# Patient Record
Sex: MALE | Race: WHITE | NOT HISPANIC OR LATINO | URBAN - METROPOLITAN AREA
[De-identification: names, ages, dates, MRNs, and addresses within clinical notes are randomized per-mention and may not be internally consistent; named-entity substitution may affect disease eponyms.]

---

## 2017-09-27 ENCOUNTER — EMERGENCY (EMERGENCY)
Facility: HOSPITAL | Age: 62
LOS: 1 days | Discharge: HOME | End: 2017-09-27
Attending: EMERGENCY MEDICINE | Admitting: EMERGENCY MEDICINE
Payer: MEDICARE

## 2017-09-27 VITALS
RESPIRATION RATE: 18 BRPM | SYSTOLIC BLOOD PRESSURE: 112 MMHG | HEIGHT: 72 IN | DIASTOLIC BLOOD PRESSURE: 65 MMHG | OXYGEN SATURATION: 97 % | WEIGHT: 308.65 LBS | TEMPERATURE: 98 F | HEART RATE: 74 BPM

## 2017-09-27 DIAGNOSIS — M54.6 PAIN IN THORACIC SPINE: ICD-10-CM

## 2017-09-27 DIAGNOSIS — Z79.82 LONG TERM (CURRENT) USE OF ASPIRIN: ICD-10-CM

## 2017-09-27 DIAGNOSIS — Z79.4 LONG TERM (CURRENT) USE OF INSULIN: ICD-10-CM

## 2017-09-27 DIAGNOSIS — M54.5 LOW BACK PAIN: ICD-10-CM

## 2017-09-27 DIAGNOSIS — Z88.5 ALLERGY STATUS TO NARCOTIC AGENT: ICD-10-CM

## 2017-09-27 DIAGNOSIS — E11.9 TYPE 2 DIABETES MELLITUS WITHOUT COMPLICATIONS: ICD-10-CM

## 2017-09-27 DIAGNOSIS — M47.895 OTHER SPONDYLOSIS, THORACOLUMBAR REGION: ICD-10-CM

## 2017-09-27 PROCEDURE — 72100 X-RAY EXAM L-S SPINE 2/3 VWS: CPT

## 2017-09-27 PROCEDURE — 99284 EMERGENCY DEPT VISIT MOD MDM: CPT | Mod: 25

## 2017-09-27 PROCEDURE — 72100 X-RAY EXAM L-S SPINE 2/3 VWS: CPT | Mod: 26

## 2017-09-27 PROCEDURE — 99283 EMERGENCY DEPT VISIT LOW MDM: CPT

## 2017-09-27 PROCEDURE — 72070 X-RAY EXAM THORAC SPINE 2VWS: CPT

## 2017-09-27 PROCEDURE — 72070 X-RAY EXAM THORAC SPINE 2VWS: CPT | Mod: 26

## 2017-09-27 RX ORDER — CYCLOBENZAPRINE HYDROCHLORIDE 10 MG/1
1 TABLET, FILM COATED ORAL
Qty: 15 | Refills: 0 | OUTPATIENT
Start: 2017-09-27 | End: 2017-10-02

## 2017-09-27 RX ORDER — CYCLOBENZAPRINE HYDROCHLORIDE 10 MG/1
10 TABLET, FILM COATED ORAL ONCE
Qty: 0 | Refills: 0 | Status: COMPLETED | OUTPATIENT
Start: 2017-09-27 | End: 2017-09-27

## 2017-09-27 RX ADMIN — CYCLOBENZAPRINE HYDROCHLORIDE 10 MILLIGRAM(S): 10 TABLET, FILM COATED ORAL at 14:30

## 2017-09-27 NOTE — ED PROVIDER NOTE - MUSCULOSKELETAL, MLM
Spine appears normal, range of motion is not limited, +b/l paraspinal thoracic and lumbar ttp, no midline C/T/L spine ttp

## 2017-09-27 NOTE — ED ADULT NURSE NOTE - OBJECTIVE STATEMENT
Pt is C/O B/L upper & lower back pain x 1 week, not relieved with ibuprofen. Pt reports difficulty getting up today.

## 2017-09-27 NOTE — ED PROVIDER NOTE - OBJECTIVE STATEMENT
60 yo male hx of DM c/o 1 week of upper and lower back pain, nonradiating, located b/l paraspinal.  No fever/chills, no urinary/bowel changes. Taking motrin without much relief. Now getting worse, had to have his daughter help him get up.  No hx of trauma/fall.  Is a retired . PMD Dr. Coto.  Scheduled to see a neurologist Dr. Ohara

## 2017-09-27 NOTE — ED PROVIDER NOTE - CARE PLAN
Principal Discharge DX:	Back pain Principal Discharge DX:	Back pain  Secondary Diagnosis:	Osteoarthritis of thoracolumbar spine, unspecified spinal osteoarthritis complication status

## 2017-09-27 NOTE — ED PROVIDER NOTE - MEDICAL DECISION MAKING DETAILS
muscle relaxers, Xray's, likely musculoskeletal muscle relaxers, Xray's, likely musculoskeletal, will f/u with ortho spine

## 2017-12-27 ENCOUNTER — EMERGENCY (EMERGENCY)
Facility: HOSPITAL | Age: 62
LOS: 1 days | Discharge: HOME | End: 2017-12-27
Attending: EMERGENCY MEDICINE | Admitting: EMERGENCY MEDICINE
Payer: MEDICARE

## 2017-12-27 VITALS
DIASTOLIC BLOOD PRESSURE: 75 MMHG | HEART RATE: 78 BPM | TEMPERATURE: 99 F | SYSTOLIC BLOOD PRESSURE: 127 MMHG | RESPIRATION RATE: 16 BRPM | OXYGEN SATURATION: 98 %

## 2017-12-27 VITALS
OXYGEN SATURATION: 98 % | HEART RATE: 83 BPM | SYSTOLIC BLOOD PRESSURE: 114 MMHG | DIASTOLIC BLOOD PRESSURE: 65 MMHG | WEIGHT: 315 LBS | TEMPERATURE: 98 F | RESPIRATION RATE: 16 BRPM

## 2017-12-27 LAB
ALBUMIN SERPL ELPH-MCNC: 2.8 G/DL — LOW (ref 3.3–5)
ALP SERPL-CCNC: 252 U/L — HIGH (ref 40–120)
ALT FLD-CCNC: 30 U/L — SIGNIFICANT CHANGE UP (ref 12–78)
ANION GAP SERPL CALC-SCNC: 9 MMOL/L — SIGNIFICANT CHANGE UP (ref 5–17)
AST SERPL-CCNC: 34 U/L — SIGNIFICANT CHANGE UP (ref 15–37)
BASOPHILS # BLD AUTO: 0.1 K/UL — SIGNIFICANT CHANGE UP (ref 0–0.2)
BASOPHILS NFR BLD AUTO: 1.6 % — SIGNIFICANT CHANGE UP (ref 0–2)
BILIRUB SERPL-MCNC: 1.9 MG/DL — HIGH (ref 0.2–1.2)
BUN SERPL-MCNC: 21 MG/DL — SIGNIFICANT CHANGE UP (ref 7–23)
CALCIUM SERPL-MCNC: 8.6 MG/DL — SIGNIFICANT CHANGE UP (ref 8.5–10.1)
CHLORIDE SERPL-SCNC: 101 MMOL/L — SIGNIFICANT CHANGE UP (ref 96–108)
CO2 SERPL-SCNC: 24 MMOL/L — SIGNIFICANT CHANGE UP (ref 22–31)
CREAT SERPL-MCNC: 1 MG/DL — SIGNIFICANT CHANGE UP (ref 0.5–1.3)
EOSINOPHIL # BLD AUTO: 0.2 K/UL — SIGNIFICANT CHANGE UP (ref 0–0.5)
EOSINOPHIL NFR BLD AUTO: 4.7 % — SIGNIFICANT CHANGE UP (ref 0–6)
GLUCOSE SERPL-MCNC: 353 MG/DL — HIGH (ref 70–99)
HCT VFR BLD CALC: 36.8 % — LOW (ref 39–50)
HGB BLD-MCNC: 12.6 G/DL — LOW (ref 13–17)
LACTATE SERPL-SCNC: 2 MMOL/L — SIGNIFICANT CHANGE UP (ref 0.7–2)
LYMPHOCYTES # BLD AUTO: 1.5 K/UL — SIGNIFICANT CHANGE UP (ref 1–3.3)
LYMPHOCYTES # BLD AUTO: 35.4 % — SIGNIFICANT CHANGE UP (ref 13–44)
MCHC RBC-ENTMCNC: 33.7 PG — SIGNIFICANT CHANGE UP (ref 27–34)
MCHC RBC-ENTMCNC: 34.1 GM/DL — SIGNIFICANT CHANGE UP (ref 32–36)
MCV RBC AUTO: 98.8 FL — SIGNIFICANT CHANGE UP (ref 80–100)
MONOCYTES # BLD AUTO: 0.4 K/UL — SIGNIFICANT CHANGE UP (ref 0–0.9)
MONOCYTES NFR BLD AUTO: 10 % — HIGH (ref 1–9)
NEUTROPHILS # BLD AUTO: 2 K/UL — SIGNIFICANT CHANGE UP (ref 1.8–7.4)
NEUTROPHILS NFR BLD AUTO: 48.3 % — SIGNIFICANT CHANGE UP (ref 43–77)
PLATELET # BLD AUTO: 105 K/UL — LOW (ref 150–400)
POTASSIUM SERPL-MCNC: 4.3 MMOL/L — SIGNIFICANT CHANGE UP (ref 3.5–5.3)
POTASSIUM SERPL-SCNC: 4.3 MMOL/L — SIGNIFICANT CHANGE UP (ref 3.5–5.3)
PROT SERPL-MCNC: 6.5 G/DL — SIGNIFICANT CHANGE UP (ref 6–8.3)
RBC # BLD: 3.73 M/UL — LOW (ref 4.2–5.8)
RBC # FLD: 12.6 % — SIGNIFICANT CHANGE UP (ref 10.3–14.5)
SODIUM SERPL-SCNC: 134 MMOL/L — LOW (ref 135–145)
WBC # BLD: 4.2 K/UL — SIGNIFICANT CHANGE UP (ref 3.8–10.5)
WBC # FLD AUTO: 4.2 K/UL — SIGNIFICANT CHANGE UP (ref 3.8–10.5)

## 2017-12-27 PROCEDURE — 85027 COMPLETE CBC AUTOMATED: CPT

## 2017-12-27 PROCEDURE — 93971 EXTREMITY STUDY: CPT

## 2017-12-27 PROCEDURE — 80053 COMPREHEN METABOLIC PANEL: CPT

## 2017-12-27 PROCEDURE — 99284 EMERGENCY DEPT VISIT MOD MDM: CPT

## 2017-12-27 PROCEDURE — 83605 ASSAY OF LACTIC ACID: CPT

## 2017-12-27 PROCEDURE — 99284 EMERGENCY DEPT VISIT MOD MDM: CPT | Mod: 25

## 2017-12-27 PROCEDURE — 93971 EXTREMITY STUDY: CPT | Mod: 26,LT

## 2017-12-27 PROCEDURE — 96374 THER/PROPH/DIAG INJ IV PUSH: CPT

## 2017-12-27 PROCEDURE — 87040 BLOOD CULTURE FOR BACTERIA: CPT

## 2017-12-27 RX ORDER — VANCOMYCIN HCL 1 G
2000 VIAL (EA) INTRAVENOUS ONCE
Qty: 0 | Refills: 0 | Status: COMPLETED | OUTPATIENT
Start: 2017-12-27 | End: 2017-12-27

## 2017-12-27 RX ORDER — VANCOMYCIN HCL 1 G
2200 VIAL (EA) INTRAVENOUS ONCE
Qty: 0 | Refills: 0 | Status: DISCONTINUED | OUTPATIENT
Start: 2017-12-27 | End: 2017-12-27

## 2017-12-27 RX ORDER — VANCOMYCIN HCL 1 G
VIAL (EA) INTRAVENOUS
Qty: 0 | Refills: 0 | Status: DISCONTINUED | OUTPATIENT
Start: 2017-12-27 | End: 2017-12-27

## 2017-12-27 RX ORDER — SODIUM CHLORIDE 9 MG/ML
1000 INJECTION INTRAMUSCULAR; INTRAVENOUS; SUBCUTANEOUS ONCE
Qty: 0 | Refills: 0 | Status: COMPLETED | OUTPATIENT
Start: 2017-12-27 | End: 2017-12-27

## 2017-12-27 RX ADMIN — Medication 250 MILLIGRAM(S): at 14:22

## 2017-12-27 RX ADMIN — SODIUM CHLORIDE 2000 MILLILITER(S): 9 INJECTION INTRAMUSCULAR; INTRAVENOUS; SUBCUTANEOUS at 14:21

## 2017-12-27 NOTE — ED PROVIDER NOTE - OBJECTIVE STATEMENT
61 y/o m with left lower extremity wound and redness x 1 week.  Pt states he is a diabetic and dint notice how he got the initial abrasion.  Pt was placed on Augmentin and Doxy by PCP with no improvement of symptoms. Yesterday while at the office the small area of induration opened and drained.  Pt states that since then his pain and the rash/redness has improved.  Pt denies fevers, chills.

## 2018-01-01 LAB
CULTURE RESULTS: SIGNIFICANT CHANGE UP
CULTURE RESULTS: SIGNIFICANT CHANGE UP
SPECIMEN SOURCE: SIGNIFICANT CHANGE UP
SPECIMEN SOURCE: SIGNIFICANT CHANGE UP

## 2018-01-23 ENCOUNTER — INPATIENT (INPATIENT)
Facility: HOSPITAL | Age: 63
LOS: 0 days | Discharge: HOME | DRG: 603 | End: 2018-01-24
Attending: FAMILY MEDICINE | Admitting: INTERNAL MEDICINE
Payer: MEDICARE

## 2018-01-23 VITALS
HEART RATE: 91 BPM | DIASTOLIC BLOOD PRESSURE: 76 MMHG | RESPIRATION RATE: 15 BRPM | HEIGHT: 72 IN | WEIGHT: 315 LBS | OXYGEN SATURATION: 97 % | SYSTOLIC BLOOD PRESSURE: 135 MMHG | TEMPERATURE: 98 F

## 2018-01-23 DIAGNOSIS — Z29.9 ENCOUNTER FOR PROPHYLACTIC MEASURES, UNSPECIFIED: ICD-10-CM

## 2018-01-23 DIAGNOSIS — Z90.49 ACQUIRED ABSENCE OF OTHER SPECIFIED PARTS OF DIGESTIVE TRACT: Chronic | ICD-10-CM

## 2018-01-23 DIAGNOSIS — E78.00 PURE HYPERCHOLESTEROLEMIA, UNSPECIFIED: ICD-10-CM

## 2018-01-23 DIAGNOSIS — L03.116 CELLULITIS OF LEFT LOWER LIMB: ICD-10-CM

## 2018-01-23 DIAGNOSIS — I10 ESSENTIAL (PRIMARY) HYPERTENSION: ICD-10-CM

## 2018-01-23 DIAGNOSIS — E11.9 TYPE 2 DIABETES MELLITUS WITHOUT COMPLICATIONS: ICD-10-CM

## 2018-01-23 DIAGNOSIS — L03.90 CELLULITIS, UNSPECIFIED: ICD-10-CM

## 2018-01-23 DIAGNOSIS — G62.9 POLYNEUROPATHY, UNSPECIFIED: ICD-10-CM

## 2018-01-23 LAB
ALBUMIN SERPL ELPH-MCNC: 3.1 G/DL — LOW (ref 3.3–5)
ALP SERPL-CCNC: 244 U/L — HIGH (ref 40–120)
ALT FLD-CCNC: 36 U/L — SIGNIFICANT CHANGE UP (ref 12–78)
ANION GAP SERPL CALC-SCNC: 10 MMOL/L — SIGNIFICANT CHANGE UP (ref 5–17)
APPEARANCE UR: CLEAR — SIGNIFICANT CHANGE UP
APTT BLD: 36.1 SEC — SIGNIFICANT CHANGE UP (ref 27.5–37.4)
AST SERPL-CCNC: 49 U/L — HIGH (ref 15–37)
BASOPHILS # BLD AUTO: 0.1 K/UL — SIGNIFICANT CHANGE UP (ref 0–0.2)
BASOPHILS NFR BLD AUTO: 1.8 % — SIGNIFICANT CHANGE UP (ref 0–2)
BILIRUB SERPL-MCNC: 2.2 MG/DL — HIGH (ref 0.2–1.2)
BILIRUB UR-MCNC: ABNORMAL
BUN SERPL-MCNC: 18 MG/DL — SIGNIFICANT CHANGE UP (ref 7–23)
CALCIUM SERPL-MCNC: 8.9 MG/DL — SIGNIFICANT CHANGE UP (ref 8.5–10.1)
CHLORIDE SERPL-SCNC: 103 MMOL/L — SIGNIFICANT CHANGE UP (ref 96–108)
CO2 SERPL-SCNC: 25 MMOL/L — SIGNIFICANT CHANGE UP (ref 22–31)
COLOR SPEC: YELLOW — SIGNIFICANT CHANGE UP
CREAT SERPL-MCNC: 1 MG/DL — SIGNIFICANT CHANGE UP (ref 0.5–1.3)
DIFF PNL FLD: NEGATIVE — SIGNIFICANT CHANGE UP
EOSINOPHIL # BLD AUTO: 0.2 K/UL — SIGNIFICANT CHANGE UP (ref 0–0.5)
EOSINOPHIL NFR BLD AUTO: 4.9 % — SIGNIFICANT CHANGE UP (ref 0–6)
GLUCOSE SERPL-MCNC: 286 MG/DL — HIGH (ref 70–99)
GLUCOSE UR QL: 1000 MG/DL
HCT VFR BLD CALC: 38.4 % — LOW (ref 39–50)
HGB BLD-MCNC: 12.6 G/DL — LOW (ref 13–17)
INR BLD: 1.23 RATIO — HIGH (ref 0.88–1.16)
KETONES UR-MCNC: NEGATIVE — SIGNIFICANT CHANGE UP
LACTATE SERPL-SCNC: 2 MMOL/L — SIGNIFICANT CHANGE UP (ref 0.7–2)
LACTATE SERPL-SCNC: 2.6 MMOL/L — HIGH (ref 0.7–2)
LEUKOCYTE ESTERASE UR-ACNC: NEGATIVE — SIGNIFICANT CHANGE UP
LYMPHOCYTES # BLD AUTO: 1.5 K/UL — SIGNIFICANT CHANGE UP (ref 1–3.3)
LYMPHOCYTES # BLD AUTO: 40.1 % — SIGNIFICANT CHANGE UP (ref 13–44)
MCHC RBC-ENTMCNC: 32.6 PG — SIGNIFICANT CHANGE UP (ref 27–34)
MCHC RBC-ENTMCNC: 32.7 GM/DL — SIGNIFICANT CHANGE UP (ref 32–36)
MCV RBC AUTO: 99.5 FL — SIGNIFICANT CHANGE UP (ref 80–100)
MONOCYTES # BLD AUTO: 0.3 K/UL — SIGNIFICANT CHANGE UP (ref 0–0.9)
MONOCYTES NFR BLD AUTO: 9 % — SIGNIFICANT CHANGE UP (ref 1–9)
NEUTROPHILS # BLD AUTO: 1.6 K/UL — LOW (ref 1.8–7.4)
NEUTROPHILS NFR BLD AUTO: 44.2 % — SIGNIFICANT CHANGE UP (ref 43–77)
NITRITE UR-MCNC: NEGATIVE — SIGNIFICANT CHANGE UP
PH UR: 5 — SIGNIFICANT CHANGE UP (ref 5–8)
PLATELET # BLD AUTO: 106 K/UL — LOW (ref 150–400)
POTASSIUM SERPL-MCNC: 4.3 MMOL/L — SIGNIFICANT CHANGE UP (ref 3.5–5.3)
POTASSIUM SERPL-SCNC: 4.3 MMOL/L — SIGNIFICANT CHANGE UP (ref 3.5–5.3)
PROT SERPL-MCNC: 7.2 G/DL — SIGNIFICANT CHANGE UP (ref 6–8.3)
PROT UR-MCNC: NEGATIVE — SIGNIFICANT CHANGE UP
PROTHROM AB SERPL-ACNC: 13.5 SEC — HIGH (ref 9.8–12.7)
RBC # BLD: 3.86 M/UL — LOW (ref 4.2–5.8)
RBC # FLD: 12.6 % — SIGNIFICANT CHANGE UP (ref 10.3–14.5)
SODIUM SERPL-SCNC: 138 MMOL/L — SIGNIFICANT CHANGE UP (ref 135–145)
SP GR SPEC: 1.02 — SIGNIFICANT CHANGE UP (ref 1.01–1.02)
UROBILINOGEN FLD QL: NEGATIVE — SIGNIFICANT CHANGE UP
WBC # BLD: 3.7 K/UL — LOW (ref 3.8–10.5)
WBC # FLD AUTO: 3.7 K/UL — LOW (ref 3.8–10.5)

## 2018-01-23 PROCEDURE — 71046 X-RAY EXAM CHEST 2 VIEWS: CPT | Mod: 26

## 2018-01-23 PROCEDURE — 99285 EMERGENCY DEPT VISIT HI MDM: CPT

## 2018-01-23 PROCEDURE — 93970 EXTREMITY STUDY: CPT | Mod: 26

## 2018-01-23 PROCEDURE — 73590 X-RAY EXAM OF LOWER LEG: CPT | Mod: 26,LT

## 2018-01-23 PROCEDURE — 99223 1ST HOSP IP/OBS HIGH 75: CPT | Mod: AI,GC

## 2018-01-23 RX ORDER — VANCOMYCIN HCL 1 G
1000 VIAL (EA) INTRAVENOUS ONCE
Qty: 0 | Refills: 0 | Status: COMPLETED | OUTPATIENT
Start: 2018-01-23 | End: 2018-01-23

## 2018-01-23 RX ORDER — ATORVASTATIN CALCIUM 80 MG/1
10 TABLET, FILM COATED ORAL AT BEDTIME
Qty: 0 | Refills: 0 | Status: DISCONTINUED | OUTPATIENT
Start: 2018-01-23 | End: 2018-01-24

## 2018-01-23 RX ORDER — INSULIN LISPRO 100/ML
0 VIAL (ML) SUBCUTANEOUS
Qty: 0 | Refills: 0 | COMMUNITY

## 2018-01-23 RX ORDER — INSULIN GLARGINE 100 [IU]/ML
0 INJECTION, SOLUTION SUBCUTANEOUS
Qty: 0 | Refills: 0 | COMMUNITY

## 2018-01-23 RX ORDER — INSULIN LISPRO 100/ML
VIAL (ML) SUBCUTANEOUS
Qty: 0 | Refills: 0 | Status: DISCONTINUED | OUTPATIENT
Start: 2018-01-23 | End: 2018-01-24

## 2018-01-23 RX ORDER — SODIUM CHLORIDE 9 MG/ML
1000 INJECTION, SOLUTION INTRAVENOUS
Qty: 0 | Refills: 0 | Status: DISCONTINUED | OUTPATIENT
Start: 2018-01-23 | End: 2018-01-24

## 2018-01-23 RX ORDER — CEFAZOLIN SODIUM 1 G
VIAL (EA) INJECTION
Qty: 0 | Refills: 0 | Status: DISCONTINUED | OUTPATIENT
Start: 2018-01-23 | End: 2018-01-24

## 2018-01-23 RX ORDER — INSULIN LISPRO 100/ML
23 VIAL (ML) SUBCUTANEOUS
Qty: 0 | Refills: 0 | Status: DISCONTINUED | OUTPATIENT
Start: 2018-01-23 | End: 2018-01-24

## 2018-01-23 RX ORDER — DEXTROSE 50 % IN WATER 50 %
1 SYRINGE (ML) INTRAVENOUS ONCE
Qty: 0 | Refills: 0 | Status: DISCONTINUED | OUTPATIENT
Start: 2018-01-23 | End: 2018-01-24

## 2018-01-23 RX ORDER — INSULIN LISPRO 100/ML
VIAL (ML) SUBCUTANEOUS
Qty: 0 | Refills: 0 | Status: DISCONTINUED | OUTPATIENT
Start: 2018-01-23 | End: 2018-01-23

## 2018-01-23 RX ORDER — INSULIN LISPRO 100/ML
VIAL (ML) SUBCUTANEOUS AT BEDTIME
Qty: 0 | Refills: 0 | Status: DISCONTINUED | OUTPATIENT
Start: 2018-01-23 | End: 2018-01-23

## 2018-01-23 RX ORDER — SODIUM CHLORIDE 9 MG/ML
1000 INJECTION INTRAMUSCULAR; INTRAVENOUS; SUBCUTANEOUS ONCE
Qty: 0 | Refills: 0 | Status: COMPLETED | OUTPATIENT
Start: 2018-01-23 | End: 2018-01-23

## 2018-01-23 RX ORDER — CEFAZOLIN SODIUM 1 G
1000 VIAL (EA) INJECTION ONCE
Qty: 0 | Refills: 0 | Status: COMPLETED | OUTPATIENT
Start: 2018-01-23 | End: 2018-01-23

## 2018-01-23 RX ORDER — ASPIRIN/CALCIUM CARB/MAGNESIUM 324 MG
81 TABLET ORAL DAILY
Qty: 0 | Refills: 0 | Status: DISCONTINUED | OUTPATIENT
Start: 2018-01-23 | End: 2018-01-24

## 2018-01-23 RX ORDER — OXYCODONE AND ACETAMINOPHEN 5; 325 MG/1; MG/1
1 TABLET ORAL EVERY 6 HOURS
Qty: 0 | Refills: 0 | Status: DISCONTINUED | OUTPATIENT
Start: 2018-01-23 | End: 2018-01-24

## 2018-01-23 RX ORDER — SODIUM CHLORIDE 9 MG/ML
1000 INJECTION INTRAMUSCULAR; INTRAVENOUS; SUBCUTANEOUS
Qty: 0 | Refills: 0 | Status: DISCONTINUED | OUTPATIENT
Start: 2018-01-23 | End: 2018-01-24

## 2018-01-23 RX ORDER — LOSARTAN POTASSIUM 100 MG/1
100 TABLET, FILM COATED ORAL DAILY
Qty: 0 | Refills: 0 | Status: DISCONTINUED | OUTPATIENT
Start: 2018-01-23 | End: 2018-01-24

## 2018-01-23 RX ORDER — VANCOMYCIN HCL 1 G
1500 VIAL (EA) INTRAVENOUS EVERY 12 HOURS
Qty: 0 | Refills: 0 | Status: DISCONTINUED | OUTPATIENT
Start: 2018-01-23 | End: 2018-01-23

## 2018-01-23 RX ORDER — DEXTROSE 50 % IN WATER 50 %
12.5 SYRINGE (ML) INTRAVENOUS ONCE
Qty: 0 | Refills: 0 | Status: DISCONTINUED | OUTPATIENT
Start: 2018-01-23 | End: 2018-01-24

## 2018-01-23 RX ORDER — GABAPENTIN 400 MG/1
800 CAPSULE ORAL
Qty: 0 | Refills: 0 | Status: DISCONTINUED | OUTPATIENT
Start: 2018-01-23 | End: 2018-01-24

## 2018-01-23 RX ORDER — ACETAMINOPHEN 500 MG
650 TABLET ORAL EVERY 6 HOURS
Qty: 0 | Refills: 0 | Status: DISCONTINUED | OUTPATIENT
Start: 2018-01-23 | End: 2018-01-24

## 2018-01-23 RX ORDER — KETOROLAC TROMETHAMINE 30 MG/ML
15 SYRINGE (ML) INJECTION ONCE
Qty: 0 | Refills: 0 | Status: DISCONTINUED | OUTPATIENT
Start: 2018-01-23 | End: 2018-01-23

## 2018-01-23 RX ORDER — INSULIN LISPRO 100/ML
VIAL (ML) SUBCUTANEOUS AT BEDTIME
Qty: 0 | Refills: 0 | Status: DISCONTINUED | OUTPATIENT
Start: 2018-01-23 | End: 2018-01-24

## 2018-01-23 RX ORDER — DEXTROSE 50 % IN WATER 50 %
25 SYRINGE (ML) INTRAVENOUS ONCE
Qty: 0 | Refills: 0 | Status: DISCONTINUED | OUTPATIENT
Start: 2018-01-23 | End: 2018-01-24

## 2018-01-23 RX ORDER — CEFAZOLIN SODIUM 1 G
1000 VIAL (EA) INJECTION EVERY 8 HOURS
Qty: 0 | Refills: 0 | Status: DISCONTINUED | OUTPATIENT
Start: 2018-01-24 | End: 2018-01-24

## 2018-01-23 RX ORDER — INSULIN LISPRO 100/ML
30 VIAL (ML) SUBCUTANEOUS
Qty: 0 | Refills: 0 | COMMUNITY

## 2018-01-23 RX ORDER — SODIUM CHLORIDE 9 MG/ML
1000 INJECTION INTRAMUSCULAR; INTRAVENOUS; SUBCUTANEOUS
Qty: 0 | Refills: 0 | Status: COMPLETED | OUTPATIENT
Start: 2018-01-23 | End: 2018-01-23

## 2018-01-23 RX ORDER — GLUCAGON INJECTION, SOLUTION 0.5 MG/.1ML
1 INJECTION, SOLUTION SUBCUTANEOUS ONCE
Qty: 0 | Refills: 0 | Status: DISCONTINUED | OUTPATIENT
Start: 2018-01-23 | End: 2018-01-24

## 2018-01-23 RX ORDER — INSULIN GLARGINE 100 [IU]/ML
60 INJECTION, SOLUTION SUBCUTANEOUS AT BEDTIME
Qty: 0 | Refills: 0 | Status: DISCONTINUED | OUTPATIENT
Start: 2018-01-23 | End: 2018-01-24

## 2018-01-23 RX ADMIN — SODIUM CHLORIDE 1000 MILLILITER(S): 9 INJECTION INTRAMUSCULAR; INTRAVENOUS; SUBCUTANEOUS at 12:10

## 2018-01-23 RX ADMIN — SODIUM CHLORIDE 100 MILLILITER(S): 9 INJECTION INTRAMUSCULAR; INTRAVENOUS; SUBCUTANEOUS at 19:24

## 2018-01-23 RX ADMIN — Medication 100 MILLIGRAM(S): at 19:24

## 2018-01-23 RX ADMIN — SODIUM CHLORIDE 1000 MILLILITER(S): 9 INJECTION INTRAMUSCULAR; INTRAVENOUS; SUBCUTANEOUS at 10:47

## 2018-01-23 RX ADMIN — GABAPENTIN 800 MILLIGRAM(S): 400 CAPSULE ORAL at 19:04

## 2018-01-23 RX ADMIN — Medication 15 MILLIGRAM(S): at 11:01

## 2018-01-23 RX ADMIN — ATORVASTATIN CALCIUM 10 MILLIGRAM(S): 80 TABLET, FILM COATED ORAL at 22:47

## 2018-01-23 RX ADMIN — Medication 250 MILLIGRAM(S): at 10:47

## 2018-01-23 RX ADMIN — Medication 15 MILLIGRAM(S): at 10:46

## 2018-01-23 RX ADMIN — INSULIN GLARGINE 60 UNIT(S): 100 INJECTION, SOLUTION SUBCUTANEOUS at 22:47

## 2018-01-23 RX ADMIN — SODIUM CHLORIDE 1000 MILLILITER(S): 9 INJECTION INTRAMUSCULAR; INTRAVENOUS; SUBCUTANEOUS at 13:42

## 2018-01-23 NOTE — ED PROVIDER NOTE - CONSTITUTIONAL, MLM
normal... Well appearing, morbidly obese w male well nourished, awake, alert, oriented to person, place, time/situation and in no apparent distress.

## 2018-01-23 NOTE — H&P ADULT - ASSESSMENT
62M w/pmh of DMII with neuropathy, HLD and HTN presenting with left leg cellulitis admitted with LLE cellulitis. 63yo M w/ PMH of uncontrolled DM2 with neuropathy, HLD and HTN presenting with persistent left leg cellulitis admitted with L LE cellulitis.

## 2018-01-23 NOTE — ED PROVIDER NOTE - MUSCULOSKELETAL, MLM
Spine appears normal, range of motion is not limited, no muscle or joint tenderness except to the left leg and thigh.  the medial prox thigh along the greater saphenous and femoral vein there is pain and tender no swelling, the lower legs are both swollen and there is a fullness in the area where the abscess was with a covering of redness on the skin

## 2018-01-23 NOTE — ED PROVIDER NOTE - OBJECTIVE STATEMENT
pt presents with a note from dr marci turner [place] [phone] " Please admit for l leg cellulitis and discharge hx of htn dyslipediemia and venous insufficienca with "on".  pt also provides two study results including LETICIA and dvt studies both normal pt presents with a note from dr Brian myrick [place] [phone] " Please admit for l leg cellulitis and discharge hx of htn dyslipidemia and venous insufficiency with "on".  pt also provides two study results including LETICIA and dvt studies both normal.  he had been on abx after an abscess developed on left lat leg in december his doctors put him on amoxil then clinda 12/27 seen in er put on iv abx for a dose was getting better and continued the clinda.  his leg stablized, still reddend, he has seen a dermatologist vascular surgeon his endocrine specialist (he has dm iwht poor glucose control) neuropathy elev chol orif l elbow and sp gb not a smoker   vascular surgeon dr Juárez in [place]

## 2018-01-23 NOTE — H&P ADULT - NSHPREVIEWOFSYSTEMS_GEN_ALL_CORE
Constitutional: denies fever, chills  HEENT: denies blurry vision, difficulty hearing  Respiratory: denies SOB, MOYER, cough, sputum production, wheezing, hemoptysis  Cardiovascular: denies CP, palpitations  Gastrointestinal: denies nausea, vomiting, diarrhea, constipation, abdominal pain  Genitourinary: denies dysuria, frequency, urgency  Skin: +left leg infection with erythema and wound draining purulence  Musculoskeletal: +left leg pain, +left lower leg tenderness, +peripheral edema of b/l calves and ankles, denies muscle weakness  Neurologic: +peripheral neuropathy, denies headache, weakness, dizziness, paresthesias  Psychiatric: denies feeling anxious Constitutional: denies fever, chills  HEENT: denies blurry vision, difficulty hearing, sore throat  Respiratory: denies SOB, MOYER, cough, sputum production, wheezing, hemoptysis  Cardiovascular: denies CP, palpitations  Gastrointestinal: denies nausea, vomiting, diarrhea, constipation, abdominal pain  Genitourinary: denies dysuria, frequency, urgency  Skin: +left leg with erythema and tenderness   Musculoskeletal: +left leg pain, +left lower leg tenderness, +peripheral edema of b/l calves and ankles, denies muscle weakness  Neurologic: +peripheral neuropathy, denies headache, weakness, dizziness, paresthesias  Psychiatric: denies feeling anxious

## 2018-01-23 NOTE — ED ADULT NURSE NOTE - OBJECTIVE STATEMENT
Pt. stated he was advised by his endocrinologist and PMD for a possible infection of a wound on the left shin. Pt. stated he was previously treated with IV antibiotics for the wound. Pt. denied fever or chills. Pt. complained of tenderness to left lower extremity. Pt. stated he has a history of diabetes and his glucose levels have been elevated. Pt. stated wound was draining fluid yesterday. No drainage noted at the wound site at this time.

## 2018-01-23 NOTE — H&P ADULT - NSHPLABSRESULTS_GEN_ALL_CORE
US Duplex: No evidence of bilateral lower extremity deep venous thrombosis.  CXR: Nonspecific mild increased markings. No consolidation or pleural effusion. Heart size within normal limits. Mild elevation right hemidiaphragm    imaging personally reviewed

## 2018-01-23 NOTE — H&P ADULT - HISTORY OF PRESENT ILLNESS
In the ED, patients vitals were: 62M w/pmh of DMII with neuropathy, HLD and HTN presenting with left leg cellulitis. Patient states that he woke up on 12/20 with small cut/scratch on left leg. States that he does not recall how he obtained cut. He saw PCP a few days after as wound became erythematous with purulent drainage. Patient completed 10 day course of Doxycycline and was given mupirocin cream for wound. States that wound initially improved with decreased in size of wound but erythema persisted. Recently, patient saw PCP because he developed pain in left leg. States that leg pain is 5-6/10 in severity, non-radiating, erythematous with purulent drainage. No bleeding. States that he has never had skin infection/rash on leg before. Denies fevers, chills, n/v, chest pain, sob, abdominal pain, muscle weakness. Denies recent travel or sick contacts. Denies cat/dog or bug bites.     In the ED, patients vitals were: T97.8F, /79, HR 82, RR 16, SpO2 96% on room air. Significant labs include: Lactate 2.6, Glucose 286, Tbili 2.2, alk phos 244, AST 49. UA positive for small bili and 1000 glucose. Patient given Vanco 1g x1, 2L normal saline, Toradol 15mg x1.   US Duplex: No evidence of bilateral lower extremity deep venous thrombosis.  CXR: Nonspecific mild increased markings. No consolidation or pleural effusion. Heart size within normal limits. Mild elevation right hemidiaphragm  Tibia XR+Fibular XR: No acute fracture or dislocation. 63yo M w/ PMH of uncontrolled DM2 with neuropathy, HLD and HTN presenting with persistent left leg cellulitis. Patient states that he woke up on 12/20 with small cut/scratch on left leg. States that he does not recall how he obtained cut. He saw PCP a few days after as wound became erythematous with purulent drainage. Patient completed 10 day course of Doxycycline which resulted in some improvement but not complete. Saw dermatologist who cultured the purulent drainage and pt given course of clindamycin and mupirocin cream for wound. The wound closed, but some of the erythema and tenderness persisted. Recently, patient saw PCP because he developed pain in left leg. States that leg pain is 5-6/10 in severity, non-radiating, erythematous. No bleeding. States that he has never had skin infection/rash on leg before. Denies fevers, chills, n/v, chest pain, SOB, abdominal pain, muscle weakness. Denies recent travel or sick contacts. Denies cat/dog or bug bites.     In the ED, patients vitals were: T97.8F, /79, HR 82, RR 16, SpO2 96% on room air. Significant labs include: Lactate 2.6, Glucose 286, Tbili 2.2, alk phos 244, AST 49. UA positive for small bili and 1000 glucose. Patient given Vanco 1g x1, 2L normal saline, Toradol 15mg x1.   US Duplex: No evidence of bilateral lower extremity deep venous thrombosis.  CXR: Nonspecific mild increased markings. No consolidation or pleural effusion. Heart size within normal limits. Mild elevation right hemidiaphragm  Tibia XR+Fibular XR: No acute fracture or dislocation.

## 2018-01-23 NOTE — H&P ADULT - PROBLEM SELECTOR PLAN 2
Uncontrolled  Patient takes Lantus 80 units QHS and Humalog 30 units TID before meals  accuchecks  follow up HgA1c Uncontrolled  Patient takes Lantus 80 units QHS and Humalog 30 units TID before meals.   accuchecks  follow up HgA1c Uncontrolled  Patient takes Lantus 80 units QHS and Humalog 30 units TID before meals. Will continue Lantus 60units and Humalog 23 units TID before meals while in hospital.   accuchecks  follow up HgA1c Uncontrolled  Patient takes Lantus 80 units QHS and Humalog 30 units TID before meals. Will start Lantus 60units and Humalog 23 units TID before meals while in hospital (~75% of home dose for now as likely his diet will be different than home).  FSG   follow up HgA1c

## 2018-01-23 NOTE — PATIENT PROFILE ADULT. - ABILITY TO HEAR (WITH HEARING AID OR HEARING APPLIANCE IF NORMALLY USED):
Mildly to Moderately Impaired: difficulty hearing in some environments or speaker may need to increase volume or speak distinctly/difficulty hearing out of right ear

## 2018-01-23 NOTE — H&P ADULT - NSHPPHYSICALEXAM_GEN_ALL_CORE
Vital Signs Last 24 Hrs  T(C): 36.6 (23 Jan 2018 13:51), Max: 36.6 (23 Jan 2018 09:42)  T(F): 97.8 (23 Jan 2018 13:51), Max: 97.9 (23 Jan 2018 09:42)  HR: 82 (23 Jan 2018 13:51) (82 - 91)  BP: 144/79 (23 Jan 2018 13:51) (135/76 - 144/79)  RR: 16 (23 Jan 2018 13:51) (15 - 16)  SpO2: 96% (23 Jan 2018 13:51) (96% - 97%)    Physical Exam:  General: Well developed, well nourished, NAD  HEENT: NCAT, PERRLA, EOMI bl, moist mucous membranes   Neck: Supple, no mass  Neurology: A&Ox3, nonfocal, sensation intact  Respiratory: CTA B/L, No W/R/R  CV: RRR, +S1/S2, no murmurs, rubs or gallops  Abdominal: obese abdomen, +reducible ventral hernia, Soft, NT, ND +BSx4, no guarding, no rebound  Extremities: 2+ b/l pitting edema of b/l calves and ankles  MSK: Normal ROM, no joint erythema or warmth, no joint swelling   Skin: +erythema of lower left leg, ~1cm open wound without bleeding or drainage, +orange peel texture, warm, tender to palpation, RLE no lesions, wounds or erythema Vital Signs Last 24 Hrs  T(C): 36.6 (23 Jan 2018 13:51), Max: 36.6 (23 Jan 2018 09:42)  T(F): 97.8 (23 Jan 2018 13:51), Max: 97.9 (23 Jan 2018 09:42)  HR: 82 (23 Jan 2018 13:51) (82 - 91)  BP: 144/79 (23 Jan 2018 13:51) (135/76 - 144/79)  RR: 16 (23 Jan 2018 13:51) (15 - 16)  SpO2: 96% (23 Jan 2018 13:51) (96% - 97%)    Physical Exam:  General: Well developed, well nourished, NAD  HEENT: NC/AT, PERRLA, EOMI b/l, moist mucous membranes   Neck: Supple, no mass  Neurology: A&Ox3, nonfocal, sensation intact  Respiratory: CTA B/L, No rales or wheezes  CV: RRR, +S1/S2  Abdominal: obese abdomen, +reducible ventral hernia, Soft, NT, ND +BSx4, no guarding, no rebound  Extremities: 2+ b/l LE pitting edema of b/l calves and ankles  MSK: Normal ROM, no joint erythema or warmth, no joint swelling   Skin: +erythema of lower left leg, ~1cm healing wound without bleeding or drainage, warm, tender to palpation. R LE no lesions, wounds or erythema  Psych: normal affect

## 2018-01-23 NOTE — H&P ADULT - PROBLEM SELECTOR PLAN 6
SCDs for DVT ppx    IMPROVE VTE Individual Risk Assessment          RISK                                                          Points  [  ] Previous VTE                                                3  [  ] Thrombophilia                                             2  [  ] Lower limb paralysis                                   2        (unable to hold up >15 seconds)    [  ] Current Cancer                                             2         (within 6 months)  [  ] Immobilization > 24 hrs                              1  [  ] ICU/CCU stay > 24 hours                             1  [x  ] Age > 60                                                         1    IMPROVE VTE Score: 1 lovenox for DVT ppx    IMPROVE VTE Individual Risk Assessment          RISK                                                          Points  [  ] Previous VTE                                                3  [  ] Thrombophilia                                             2  [  ] Lower limb paralysis                                   2        (unable to hold up >15 seconds)    [  ] Current Cancer                                             2         (within 6 months)  [  ] Immobilization > 24 hrs                              1  [  ] ICU/CCU stay > 24 hours                             1  [x  ] Age > 60                                                         1    IMPROVE VTE Score: 1 somewhat elevated t bili and alk phos  -pt has had cholecystectomy in the past  -no abdominal symptoms  -discussed with PMD Dr. Coto over the phone who stated those are his baseline numbers for years and has had w/up and imaging as outpt that did not reveal anything significant  -will not pursue further as inpt -- f/up with GI/hepatology as outpt

## 2018-01-23 NOTE — H&P ADULT - PROBLEM SELECTOR PLAN 7
lovenox for DVT ppx    IMPROVE VTE Individual Risk Assessment          RISK                                                          Points  [  ] Previous VTE                                                3  [  ] Thrombophilia                                             2  [  ] Lower limb paralysis                                   2        (unable to hold up >15 seconds)    [  ] Current Cancer                                             2         (within 6 months)  [  ] Immobilization > 24 hrs                              1  [  ] ICU/CCU stay > 24 hours                             1  [x  ] Age > 60                                                         1    IMPROVE VTE Score: 1

## 2018-01-23 NOTE — H&P ADULT - NSHPSOCIALHISTORY_GEN_ALL_CORE
Lives with wife at home.   Retired.   Tobacco: never smoker  Alcohol: socially, last drink 5/2017  Drugs: denies

## 2018-01-23 NOTE — H&P ADULT - NSHPOUTPATIENTPROVIDERS_GEN_ALL_CORE
PMD- Dr. Brian Coto ([id number]) PMD- Dr. Brian Coto ([id number])  Endo-Dr. Mayer  Vascular surgeon- Dr. Juárez ([place]) PMD- Dr. Brian Coto ([id number])  Derm - Dr. Aminata Coto  Endo-Dr. Mayer  Vascular surgeon- Dr. Juárez ([place])

## 2018-01-23 NOTE — H&P ADULT - PROBLEM SELECTOR PLAN 1
Admit to [facility]  -Continue Vancomycin  - Follow up blood and wound culture  - Continue IVF Admit to [facility]  -Continue Vancomycin  - Follow up blood and wound culture  - Continue IVF  -Pain management with toradol and morphine Admit to [facility]  -Continue Vancomycin  - Follow up blood and wound culture  - Continue IVF  -Pain management with tylenol and percocet Admit to [facility]  -called dermatologist who had cultured purulent drainage a few weeks ago and found to have MSSA  -cellulitis and ulceration have improved significantly on outpt treatment but not completely which is why his PMD referred him to ER for admission for IV Abx  -given it was MSSA, will start Ancef and monitor for improvement  - Follow up blood and wound cultures   - lactate improved with IVF  - ID consult - Dr. Rios  - Pain management with tylenol and percocet  - if not improving on Ancef, consider imaging for deeper infection

## 2018-01-23 NOTE — ED PROVIDER NOTE - SKIN, MLM
Skin normal color for race, warm, dry and intact. there is a resolving abscess on left leg and red raised rash on legs no heat or discharge

## 2018-01-23 NOTE — H&P ADULT - ATTENDING COMMENTS
Pt seen + examined. Case discussed with intern/resident. Agree with H&P above (edited) with following addendum:  61yo M w/ PMH of uncontrolled DM2 with neuropathy, HLD and HTN presenting with persistent left leg cellulitis admitted with L LE cellulitis.  -called PMD and pt's dermatologist and spoke to them about progression of pt's cellulitis -- it seems pt's cellulitis and ulceration was much worse several weeks ago and he has had pretty good response so far, but incomplete response which is why PMD sent to the ER for IV Abx  -pt had clinda, which can sometimes have inducible resistance with Staph, so perhaps a penicillin or cephalosporin will fare better  -pan-sensitive MSSA on culture of the pus that dermatologist wilmer when pt had draining ulceration  -discussed with ID consult, Dr. Rios  -will start ancef and monitor for response -- if responding well, consider de-escalating to Keflex  -pt's cellulitis likely harder to control due to uncontrolled DM2  -pt also with venous insufficiency and b/l LE edema that causes more tension in the skin and is likely contributing to his tenderness  -recommended leg elevation and when lactate normalizes, consider starting low-dose diuretic tomorrow.   -will give 75% of home insulin dosing and titrate up as needed based on HISS coverage needed. Starting lower as pt's diet will likely be better controlled in the inpt setting.    Called PMD and spoke to him over the phone regarding admission. Pt seen + examined. Case discussed with intern/resident. Agree with H&P above (edited) with following addendum:  63yo M w/ PMH of uncontrolled DM2 with neuropathy, HLD and HTN presenting with persistent left leg cellulitis admitted with L LE cellulitis.  -called PMD and pt's dermatologist and spoke to them about progression of pt's cellulitis -- it seems pt's cellulitis and ulceration was much worse several weeks ago and he has had pretty good response so far, but incomplete response which is why PMD sent to the ER for IV Abx  -pt had clinda, which can sometimes have inducible resistance with Staph, so perhaps a penicillin or cephalosporin will fare better  -pan-sensitive MSSA on culture of the pus that dermatologist wilmer when pt had draining ulceration  -discussed with ID consult, Dr. Rios  -will start ancef and monitor for response -- if responding well, consider de-escalating to Keflex  -pt's cellulitis likely harder to control due to uncontrolled DM2  -pt also with venous insufficiency and b/l LE edema that causes more tension in the skin and is likely contributing to his tenderness  -recommended leg elevation and when lactate normalizes, consider starting low-dose diuretic tomorrow.   -will give 75% of home insulin dosing and titrate up as needed based on HISS coverage needed. Starting lower as pt's diet will likely be better controlled in the inpt setting.  -regarding somewhat elevated t bili and alk phos: discussed with PMD Dr. Coto over the phone who stated those are his baseline numbers for years and has had w/up and imaging as outpt that did not reveal anything significant; thus, will not pursue further as inpt -- f/up with GI/hepatology as outpt    Called PMD and spoke to him over the phone regarding admission.

## 2018-01-24 ENCOUNTER — TRANSCRIPTION ENCOUNTER (OUTPATIENT)
Age: 63
End: 2018-01-24

## 2018-01-24 VITALS
TEMPERATURE: 98 F | DIASTOLIC BLOOD PRESSURE: 72 MMHG | HEART RATE: 96 BPM | OXYGEN SATURATION: 96 % | RESPIRATION RATE: 18 BRPM | SYSTOLIC BLOOD PRESSURE: 137 MMHG

## 2018-01-24 DIAGNOSIS — E80.6 OTHER DISORDERS OF BILIRUBIN METABOLISM: ICD-10-CM

## 2018-01-24 LAB
ANION GAP SERPL CALC-SCNC: 8 MMOL/L — SIGNIFICANT CHANGE UP (ref 5–17)
BUN SERPL-MCNC: 14 MG/DL — SIGNIFICANT CHANGE UP (ref 7–23)
CALCIUM SERPL-MCNC: 8.6 MG/DL — SIGNIFICANT CHANGE UP (ref 8.5–10.1)
CHLORIDE SERPL-SCNC: 108 MMOL/L — SIGNIFICANT CHANGE UP (ref 96–108)
CO2 SERPL-SCNC: 25 MMOL/L — SIGNIFICANT CHANGE UP (ref 22–31)
CREAT SERPL-MCNC: 0.9 MG/DL — SIGNIFICANT CHANGE UP (ref 0.5–1.3)
GLUCOSE SERPL-MCNC: 210 MG/DL — HIGH (ref 70–99)
HBA1C BLD-MCNC: 10.2 % — HIGH (ref 4–5.6)
HCT VFR BLD CALC: 36.8 % — LOW (ref 39–50)
HGB BLD-MCNC: 12 G/DL — LOW (ref 13–17)
MCHC RBC-ENTMCNC: 31.7 PG — SIGNIFICANT CHANGE UP (ref 27–34)
MCHC RBC-ENTMCNC: 32.5 GM/DL — SIGNIFICANT CHANGE UP (ref 32–36)
MCV RBC AUTO: 97.6 FL — SIGNIFICANT CHANGE UP (ref 80–100)
PLATELET # BLD AUTO: 100 K/UL — LOW (ref 150–400)
POTASSIUM SERPL-MCNC: 4.1 MMOL/L — SIGNIFICANT CHANGE UP (ref 3.5–5.3)
POTASSIUM SERPL-SCNC: 4.1 MMOL/L — SIGNIFICANT CHANGE UP (ref 3.5–5.3)
RBC # BLD: 3.77 M/UL — LOW (ref 4.2–5.8)
RBC # FLD: 13.2 % — SIGNIFICANT CHANGE UP (ref 10.3–14.5)
SODIUM SERPL-SCNC: 141 MMOL/L — SIGNIFICANT CHANGE UP (ref 135–145)
WBC # BLD: 4.2 K/UL — SIGNIFICANT CHANGE UP (ref 3.8–10.5)
WBC # FLD AUTO: 4.2 K/UL — SIGNIFICANT CHANGE UP (ref 3.8–10.5)

## 2018-01-24 PROCEDURE — 80048 BASIC METABOLIC PNL TOTAL CA: CPT

## 2018-01-24 PROCEDURE — 99239 HOSP IP/OBS DSCHRG MGMT >30: CPT

## 2018-01-24 PROCEDURE — 73590 X-RAY EXAM OF LOWER LEG: CPT

## 2018-01-24 PROCEDURE — 83605 ASSAY OF LACTIC ACID: CPT

## 2018-01-24 PROCEDURE — 81003 URINALYSIS AUTO W/O SCOPE: CPT

## 2018-01-24 PROCEDURE — 80053 COMPREHEN METABOLIC PANEL: CPT

## 2018-01-24 PROCEDURE — 84145 PROCALCITONIN (PCT): CPT

## 2018-01-24 PROCEDURE — 87040 BLOOD CULTURE FOR BACTERIA: CPT

## 2018-01-24 PROCEDURE — 71046 X-RAY EXAM CHEST 2 VIEWS: CPT

## 2018-01-24 PROCEDURE — 83036 HEMOGLOBIN GLYCOSYLATED A1C: CPT

## 2018-01-24 PROCEDURE — 99285 EMERGENCY DEPT VISIT HI MDM: CPT | Mod: 25

## 2018-01-24 PROCEDURE — 96375 TX/PRO/DX INJ NEW DRUG ADDON: CPT

## 2018-01-24 PROCEDURE — 85610 PROTHROMBIN TIME: CPT

## 2018-01-24 PROCEDURE — 85027 COMPLETE CBC AUTOMATED: CPT

## 2018-01-24 PROCEDURE — 82962 GLUCOSE BLOOD TEST: CPT

## 2018-01-24 PROCEDURE — 85730 THROMBOPLASTIN TIME PARTIAL: CPT

## 2018-01-24 PROCEDURE — 96365 THER/PROPH/DIAG IV INF INIT: CPT

## 2018-01-24 PROCEDURE — 83880 ASSAY OF NATRIURETIC PEPTIDE: CPT

## 2018-01-24 PROCEDURE — 93970 EXTREMITY STUDY: CPT

## 2018-01-24 RX ORDER — INSULIN LISPRO 100/ML
20 VIAL (ML) SUBCUTANEOUS
Qty: 0 | Refills: 0 | COMMUNITY

## 2018-01-24 RX ORDER — ENOXAPARIN SODIUM 100 MG/ML
40 INJECTION SUBCUTANEOUS DAILY
Qty: 0 | Refills: 0 | Status: DISCONTINUED | OUTPATIENT
Start: 2018-01-24 | End: 2018-01-24

## 2018-01-24 RX ORDER — INSULIN LISPRO 100/ML
30 VIAL (ML) SUBCUTANEOUS
Qty: 0 | Refills: 0 | COMMUNITY

## 2018-01-24 RX ORDER — CEPHALEXIN 500 MG
1 CAPSULE ORAL
Qty: 12 | Refills: 0 | OUTPATIENT
Start: 2018-01-24 | End: 2018-01-29

## 2018-01-24 RX ADMIN — Medication 100 MILLIGRAM(S): at 05:42

## 2018-01-24 RX ADMIN — Medication 23 UNIT(S): at 12:55

## 2018-01-24 RX ADMIN — GABAPENTIN 800 MILLIGRAM(S): 400 CAPSULE ORAL at 12:55

## 2018-01-24 RX ADMIN — Medication 23 UNIT(S): at 08:28

## 2018-01-24 RX ADMIN — LOSARTAN POTASSIUM 100 MILLIGRAM(S): 100 TABLET, FILM COATED ORAL at 05:42

## 2018-01-24 RX ADMIN — Medication 2: at 12:56

## 2018-01-24 RX ADMIN — GABAPENTIN 800 MILLIGRAM(S): 400 CAPSULE ORAL at 00:06

## 2018-01-24 RX ADMIN — Medication 2: at 08:27

## 2018-01-24 RX ADMIN — Medication 81 MILLIGRAM(S): at 12:55

## 2018-01-24 RX ADMIN — Medication 100 MILLIGRAM(S): at 13:06

## 2018-01-24 RX ADMIN — ENOXAPARIN SODIUM 40 MILLIGRAM(S): 100 INJECTION SUBCUTANEOUS at 12:56

## 2018-01-24 RX ADMIN — GABAPENTIN 800 MILLIGRAM(S): 400 CAPSULE ORAL at 05:42

## 2018-01-24 NOTE — PROGRESS NOTE ADULT - PROBLEM SELECTOR PLAN 6
somewhat elevated t bili and alk phos  -pt has had cholecystectomy in the past  -no abdominal symptoms  -discussed with PMD Dr. Coto over the phone who stated those are his baseline numbers for years and has had w/up and imaging as outpt that did not reveal anything significant  -will not pursue further as inpt -- f/up with GI/hepatology as outpt

## 2018-01-24 NOTE — DISCHARGE NOTE ADULT - HOSPITAL COURSE
63yo M w/ PMH of uncontrolled DM2 with neuropathy, HLD and HTN presenting with persistent left leg cellulitis admitted with L ELYSE cellulitis. Patient completed a course of Doxycycline and clindamycin as outpatient with persistence. 63yo M w/ PMH of uncontrolled DM2 with neuropathy, HLD and HTN presenting with persistent left leg cellulitis admitted with L LE cellulitis. Patient had completed a course of Doxycycline and clindamycin as outpatient with persistence of cellulitis. Patient was given Vancomycin in the ED. Doppler US was negative for DVT.  Discussions with dermatologist revealed wound culture was positive for MSSA when pt had draining ulceration. Antibiotics were switched to Ancef IV and transitioned to PO Keflex because of good response. Proper wound healing likely hindered by uncontrolled diabetes and venous stasis. In the hospital, patient was given 75% of his Lantus and Humalog due to change in diet. 63yo M w/ PMH of uncontrolled DM2 with neuropathy, HLD and HTN presenting with persistent left leg cellulitis admitted with L LE cellulitis. Patient had completed a course of Doxycycline and clindamycin as outpatient with persistence of cellulitis. Doppler US was negative for DVT.  Discussions with outpt dermatologist revealed wound culture was positive for MSSA, treated with IV Ancef  and transitioned to PO Keflex. Proper wound healing likely hindered by uncontrolled diabetes and venous stasis. Pt stable for d/c to home. Should follow-up with cardio for repeat echo and PMD within 1 week. 63yo M w/ PMH of uncontrolled DM2 with neuropathy, HLD and HTN presenting with persistent left leg cellulitis admitted with L LE cellulitis. Patient had completed a course of Doxycycline and clindamycin as outpatient with persistence of cellulitis. Doppler US was negative for DVT.  Discussions with outpt dermatologist revealed wound culture was positive for MSSA, treated with IV Ancef  and transitioned to PO Keflex. Proper wound healing likely hindered by uncontrolled diabetes and venous stasis. Pt stable for d/c to home. Should follow-up with cardio for repeat echo and PMD within 1 week.     Time spent: 65 minutes  PMD office informed of dishcarge

## 2018-01-24 NOTE — DISCHARGE NOTE ADULT - CARE PLAN
Principal Discharge DX:	Cellulitis of left lower extremity  Goal:	Resolution.  Assessment and plan of treatment:	You should continue antibiotics. You should follow up with your PCP.  Secondary Diagnosis:	Diabetes  Assessment and plan of treatment:	You should continue your home dose of Lantus and Humalog. You should continue monitoring your blood glucose. Follow up with your Endocrinologist for better control of your blood sugar.  Secondary Diagnosis:	Hypercholesteremia  Assessment and plan of treatment:	You should continue Pravastatin.  Secondary Diagnosis:	Hypertension  Assessment and plan of treatment:	You should continue Irbesartan.  Secondary Diagnosis:	Neuropathy  Assessment and plan of treatment:	You should continue Gabapentin. Principal Discharge DX:	Cellulitis of left lower extremity  Goal:	Resolution.  Assessment and plan of treatment:	You should continue antibiotics for 6 more days. You should follow up with your PCP.  Secondary Diagnosis:	Diabetes  Assessment and plan of treatment:	You should continue your home dose of Lantus and Humalog. You should continue monitoring your blood glucose. Follow up with your Endocrinologist for better control of your blood sugar.  Secondary Diagnosis:	Hypercholesteremia  Assessment and plan of treatment:	You should continue Pravastatin.  Secondary Diagnosis:	Hypertension  Assessment and plan of treatment:	You should continue Irbesartan.  Secondary Diagnosis:	Neuropathy  Assessment and plan of treatment:	You should continue Gabapentin.  Secondary Diagnosis:	Peripheral edema  Assessment and plan of treatment:	Should follow up with cardiology regarding repeat echocardiogram. Follow up with your vascular surgeon. Keep legs elevated and wear compression stockings.

## 2018-01-24 NOTE — PROGRESS NOTE ADULT - SUBJECTIVE AND OBJECTIVE BOX
Patient is a 62y old  Male who presents with a chief complaint of cellulitis (23 Jan 2018 22:30)    INTERVAL HPI/OVERNIGHT EVENTS: Patient complains of weight gain over past few days. States that last weight was 320lbs and recently at Endocrinologist's office was 340blbs, now at hospital 350lbs. Patient has not had ECHO in over a year and does not recall last EF. States that pain is tolerable. Denies fevers, chills, n/v, chest pain, sob, abdominal pain or muscle weakness.     REVIEW OF SYSTEMS:  CONSTITUTIONAL: +weight gain, No fever, no fatigue  ENMT:  No difficulty hearing, tinnitus, vertigo; No sinus or throat pain  NECK: No pain or stiffness  RESPIRATORY: No cough, wheezing, chills  No shortness of breath  CARDIOVASCULAR: +b/l peripheral edema, No chest pain, palpitations, dizziness  GASTROINTESTINAL: No abdominal or epigastric pain. No nausea, vomiting, or hematemesis; No diarrhea or constipation  GENITOURINARY: No dysuria, frequency, hematuria  NEUROLOGICAL: No headaches, memory loss, loss of strength, numbness, or tremors  SKIN: mild erythema of LLE  MUSCULOSKELETAL: +b/l peripheral edema of calves and ankles  PSYCHIATRIC: No depression, anxiety    Vital Signs Last 24 Hrs  T(C): 37.1 (24 Jan 2018 05:08), Max: 37.1 (24 Jan 2018 05:08)  T(F): 98.7 (24 Jan 2018 05:08), Max: 98.7 (24 Jan 2018 05:08)  HR: 96 (24 Jan 2018 05:08) (82 - 98)  BP: 123/68 (24 Jan 2018 05:08) (123/68 - 144/79)  RR: 18 (24 Jan 2018 05:08) (16 - 18)  SpO2: 93% (24 Jan 2018 05:08) (93% - 99%)    PHYSICAL EXAM:  GENERAL: No acute distress, well-groomed, well-developed  HEAD:  Atraumatic, Normocephalic  EYES: EOMI, PERRL, conjunctiva and sclera clear  ENMT: No tonsillar erythema, exudates, or enlargement; Moist mucous membranes, Good dentition, No lesions  NECK: Supple, No Jugular Venous Distension, Normal thyroid  NERVOUS SYSTEM:  Alert & Oriented X3, Good concentration; Motor Strength 5/5 B/L upper and lower extremities  CHEST/LUNG: Clear to auscultation bilaterally; No rales, rhonchi, wheezing, or rubs  HEART: Regular rate and rhythm; No murmurs, rubs, or gallops  ABDOMEN: Soft, Nontender, Nondistended; Bowel sounds present, no guarding, no rebound  EXTREMITIES:  2+ Peripheral Pulses, 2+ pitting edema b/l calves and ankles  LYMPH: No lymphadenopathy noted  SKIN: ~1cm ulceration on left lateral calf, no active bleeding or drainage      LABS:                        12.0   4.2   )-----------( 100      ( 24 Jan 2018 07:34 )             36.8     24 Jan 2018 07:34    141    |  108    |  14     ----------------------------<  210    4.1     |  25     |  0.90     Ca    8.6        24 Jan 2018 07:34    TPro  7.2    /  Alb  3.1    /  TBili  2.2    /  DBili  x      /  AST  49     /  ALT  36     /  AlkPhos  244    23 Jan 2018 11:10    PT/INR - ( 23 Jan 2018 11:10 )   PT: 13.5 sec;   INR: 1.23 ratio    PTT - ( 23 Jan 2018 11:10 )  PTT:36.1 sec  CAPILLARY BLOOD GLUCOSE  POCT Blood Glucose.: 199 mg/dL (24 Jan 2018 07:56)  POCT Blood Glucose.: 207 mg/dL (23 Jan 2018 21:55)  POCT Blood Glucose.: 150 mg/dL (23 Jan 2018 17:40)  POCT Blood Glucose.: 251 mg/dL (23 Jan 2018 10:51)    MEDICATIONS  (STANDING):  aspirin enteric coated 81 milliGRAM(s) Oral daily  atorvastatin 10 milliGRAM(s) Oral at bedtime  ceFAZolin   IVPB      ceFAZolin   IVPB 1000 milliGRAM(s) IV Intermittent every 8 hours  dextrose 5%. 1000 milliLiter(s) (50 mL/Hr) IV Continuous <Continuous>  dextrose 50% Injectable 12.5 Gram(s) IV Push once  dextrose 50% Injectable 25 Gram(s) IV Push once  dextrose 50% Injectable 25 Gram(s) IV Push once  enoxaparin Injectable 40 milliGRAM(s) SubCutaneous daily  gabapentin 800 milliGRAM(s) Oral four times a day  insulin glargine Injectable (LANTUS) 60 Unit(s) SubCutaneous at bedtime  insulin lispro (HumaLOG) corrective regimen sliding scale   SubCutaneous three times a day before meals  insulin lispro (HumaLOG) corrective regimen sliding scale   SubCutaneous at bedtime  insulin lispro Injectable (HumaLOG) 23 Unit(s) SubCutaneous three times a day before meals  losartan 100 milliGRAM(s) Oral daily    MEDICATIONS  (PRN):  acetaminophen   Tablet. 650 milliGRAM(s) Oral every 6 hours PRN Mild Pain (1 - 3)  dextrose Gel 1 Dose(s) Oral once PRN Blood Glucose LESS THAN 70 milliGRAM(s)/deciliter  glucagon  Injectable 1 milliGRAM(s) IntraMuscular once PRN Glucose LESS THAN 70 milligrams/deciliter  oxyCODONE    5 mG/acetaminophen 325 mG 1 Tablet(s) Oral every 6 hours PRN Moderate Pain (4 - 6)    Allergies: codeine (Anaphylaxis)

## 2018-01-24 NOTE — DISCHARGE NOTE ADULT - MEDICATION SUMMARY - MEDICATIONS TO TAKE
I will START or STAY ON the medications listed below when I get home from the hospital:    Aspir 81 oral delayed release tablet  -- 1 tab(s) by mouth once a day  -- Indication: For Cad    irbesartan 300 mg oral tablet  -- 1 tab(s) by mouth once a day  -- Indication: For Htn    gabapentin 800 mg oral tablet  -- orally 4 times a day  -- Indication: For Neuropathy    metFORMIN 1000 mg oral tablet  -- 1 tab(s) by mouth 2 times a day  -- Indication: For Diabetes    Lantus 100 units/mL subcutaneous solution  -- 80  subcutaneous once a day (at bedtime)  -- Indication: For Diabetes    HumaLOG 100 units/mL subcutaneous solution  -- 30 unit(s) subcutaneous 3 times a day  -- Indication: For Diabetes    pravastatin 40 mg oral tablet  -- 1 tab(s) by mouth once a day  -- Indication: For Hld    Keflex 500 mg oral capsule  -- 1 cap(s) by mouth every 12 hours   -- Finish all this medication unless otherwise directed by prescriber.    -- Indication: For Cellulitis

## 2018-01-24 NOTE — PROGRESS NOTE ADULT - PROBLEM SELECTOR PLAN 2
Uncontrolled  Patient takes Lantus 80 units QHS and Humalog 30 units TID before meals. Will start Lantus 60units and Humalog 23 units TID before meals while in hospital (~75% of home dose for now as likely his diet will be different than home).  FSG   follow up HgA1c

## 2018-01-24 NOTE — PROGRESS NOTE ADULT - ASSESSMENT
63yo M w/ PMH of uncontrolled DM2 with neuropathy, HLD and HTN presenting with persistent left leg cellulitis admitted with L LE cellulitis.

## 2018-01-24 NOTE — DISCHARGE NOTE ADULT - PATIENT PORTAL LINK FT
“You can access the Follow[employer] Patient Portal, offered by [facility], by registering with the following website: [email or web address]”

## 2018-01-24 NOTE — DISCHARGE NOTE ADULT - PLAN OF CARE
Resolution. You should continue antibiotics. You should follow up with your PCP. You should continue your home dose of Lantus and Humalog. You should continue monitoring your blood glucose. Follow up with your Endocrinologist for better control of your blood sugar. You should continue Pravastatin. You should continue Irbesartan. You should continue Gabapentin. Should follow up with cardiology regarding repeat echocardiogram. Follow up with your vascular surgeon. Keep legs elevated and wear compression stockings. You should continue antibiotics for 6 more days. You should follow up with your PCP.

## 2018-01-24 NOTE — DISCHARGE NOTE ADULT - PROVIDER TOKENS
FREE:[LAST:[Nnamdi],FIRST:[Brian],PHONE:[phone]],FAX:[(   [phone]    -],ADDRESS:[[address] [place] [address]]]

## 2018-01-24 NOTE — DIETITIAN INITIAL EVALUATION ADULT. - PROBLEM SELECTOR PLAN 1
Admit to [facility]  -called dermatologist who had cultured purulent drainage a few weeks ago and found to have MSSA  -cellulitis and ulceration have improved significantly on outpt treatment but not completely which is why his PMD referred him to ER for admission for IV Abx  -given it was MSSA, will start Ancef and monitor for improvement  - Follow up blood and wound cultures   - lactate improved with IVF  - ID consult - Dr. Rios  - Pain management with tylenol and percocet  - if not improving on Ancef, consider imaging for deeper infection

## 2018-01-24 NOTE — PROGRESS NOTE ADULT - PROBLEM SELECTOR PLAN 1
-called dermatologist who had cultured purulent drainage a few weeks ago and found to have MSSA  -cellulitis and ulceration have improved significantly on outpt treatment but not completely which is why his PMD referred him to ER for admission for IV Abx  -given it was MSSA, will start Ancef and monitor for improvement  - Follow up blood and wound cultures. Blood cultures from 12/27 neg x2  - lactate improved with IVF  - ID consult - Dr. Rios  - Pain management with tylenol and percocet  - if not improving on Ancef, consider imaging for deeper infection

## 2018-01-24 NOTE — CONSULT NOTE ADULT - SUBJECTIVE AND OBJECTIVE BOX
HPI:  63yo M w/ PMH of uncontrolled DM2 with neuropathy, HLD and HTN chronic LE edema  and chronic stasis presenting with recurrent left leg cellulitis. Recently was on Doxycycline   and Clindamycin.  Denies fevers, chills, n/v, chest pain, SOB, abdominal pain, muscle   weakness. Denies recent travel or sick contacts.      Infectious Disease consult was called to evaluate pt and for antibiotic management,    Past Medical & Surgical Hx:  PAST MEDICAL & SURGICAL HISTORY:  Renal stones: 25 years ago  Hypertension  Neuropathy  Hypercholesteremia  Diabetes  S/P cholecystectomy      Social History--  EtOH: denies   Tobacco: denies   Drug Use: denies     FAMILY HISTORY:  No pertinent family history in first degree relatives    Allergies  codeine (Anaphylaxis)    Home Medications:  Aspir 81 oral delayed release tablet: 1 tab(s) orally once a day (23 Jan 2018 22:40)  gabapentin 800 mg oral tablet: orally 4 times a day (23 Jan 2018 22:40)  HumaLOG 100 units/mL subcutaneous solution: 20  subcutaneous 2 times a day  breakfast and lunch (23 Jan 2018 22:40)  HumaLOG 100 units/mL subcutaneous solution: 30 unit(s) subcutaneous once a day  at dinner (23 Jan 2018 22:40)  irbesartan 300 mg oral tablet: 1 tab(s) orally once a day (23 Jan 2018 22:40)  Lantus 100 units/mL subcutaneous solution: 80  subcutaneous once a day (at bedtime) (23 Jan 2018 22:40)  metFORMIN 1000 mg oral tablet: 1 tab(s) orally 2 times a day (23 Jan 2018 22:40)  pravastatin 40 mg oral tablet: 1 tab(s) orally once a day (23 Jan 2018 22:40)      Current Inpatient Medications :    ANTIBIOTICS:   ceFAZolin   IVPB      ceFAZolin   IVPB 1000 milliGRAM(s) IV Intermittent every 8 hours      OTHER RELEVANT MEDICATIONS :  acetaminophen   Tablet. 650 milliGRAM(s) Oral every 6 hours PRN  aspirin enteric coated 81 milliGRAM(s) Oral daily  atorvastatin 10 milliGRAM(s) Oral at bedtime  dextrose 5%. 1000 milliLiter(s) IV Continuous <Continuous>  dextrose 50% Injectable 12.5 Gram(s) IV Push once  dextrose 50% Injectable 25 Gram(s) IV Push once  dextrose 50% Injectable 25 Gram(s) IV Push once  dextrose Gel 1 Dose(s) Oral once PRN  enoxaparin Injectable 40 milliGRAM(s) SubCutaneous daily  gabapentin 800 milliGRAM(s) Oral four times a day  glucagon  Injectable 1 milliGRAM(s) IntraMuscular once PRN  insulin glargine Injectable (LANTUS) 60 Unit(s) SubCutaneous at bedtime  insulin lispro (HumaLOG) corrective regimen sliding scale   SubCutaneous three times a day before meals  insulin lispro (HumaLOG) corrective regimen sliding scale   SubCutaneous at bedtime  insulin lispro Injectable (HumaLOG) 23 Unit(s) SubCutaneous three times a day before meals  losartan 100 milliGRAM(s) Oral daily  oxyCODONE    5 mG/acetaminophen 325 mG 1 Tablet(s) Oral every 6 hours PRN      ROS:  CONSTITUTIONAL:  Negative fever or chills, feels well, good appetite  EYES:  Negative  blurry vision or double vision  CARDIOVASCULAR:  Negative for chest pain or palpitations  RESPIRATORY:  Negative for cough, wheezing, or SOB   GASTROINTESTINAL:  Negative for nausea, vomiting, diarrhea, constipation, or abdominal pain  GENITOURINARY:  Negative frequency, urgency , dysuria or hematuria   NEUROLOGIC:  No headache, confusion, dizziness, lightheadedness  All other systems were reviewed and are negative  I&O's Detail    23 Jan 2018 07:01  -  24 Jan 2018 07:00  --------------------------------------------------------  IN:    sodium chloride 0.9%: 550 mL    Solution: 50 mL  Total IN: 600 mL    OUT:  Total OUT: 0 mL    Total NET: 600 mL    Physical Exam:  Vital Signs Last 24 Hrs  T(C): 37.1 (24 Jan 2018 05:08), Max: 37.1 (24 Jan 2018 05:08)  T(F): 98.7 (24 Jan 2018 05:08), Max: 98.7 (24 Jan 2018 05:08)  HR: 96 (24 Jan 2018 05:08) (82 - 98)  BP: 123/68 (24 Jan 2018 05:08) (123/68 - 144/79)  BP(mean): --  RR: 18 (24 Jan 2018 05:08) (16 - 18)  SpO2: 93% (24 Jan 2018 05:08) (93% - 99%)    Weight (kg): 155 (01-23 @ 22:03)    General: well developed well nourished, in no acute distress  Eyes: sclera anicteric, pupils equal and reactive to light  ENMT: buccal mucosa moist, pharynx not injected  Neck: supple, trachea midline  Lungs: clear, no wheeze/rhonchi  Cardiovascular: regular rate and rhythm, S1 S2  Abdomen: soft, nontender, no organomegaly present, bowel sounds normal  Neurological:  alert and oriented x3, Cranial Nerves II-XII grossly intact  Skin:no increased ecchymosis/petechiae/purpura  Lymph Nodes: no palpable cervical/supraclavicular lymph nodes enlargements  Extremities: no cyanosis/clubbing  Ramin LE edema with chronic stasis  Left LE decreased erythema    Labs:                        12.0   4.2   )-----------( 100      ( 24 Jan 2018 07:34 )             36.8   01-24    141  |  108  |  14  ----------------------------<  210<H>  4.1   |  25  |  0.90    Ca    8.6      24 Jan 2018 07:34    TPro  7.2  /  Alb  3.1<L>  /  TBili  2.2<H>  /  DBili  x   /  AST  49<H>  /  ALT  36  /  AlkPhos  244<H>  01-23      RECENT CULTURES:  PENDING    RADIOLOGY & ADDITIONAL STUDIES:  EXAM:  US DPLX LWR EXT VEINS COMPL BI                            PROCEDURE DATE:  01/23/2018          INTERPRETATION:  CLINICAL INFORMATION: Bilateral leg swelling.    COMPARISON: Doppler venous sonogram 12/27/2017.    TECHNIQUE: Duplex sonography of the BILATERAL LOWER extremities with   color and spectral Doppler, with and without compression.      FINDINGS:    There is normal compressibility of the bilateral common femoral, femoral   and popliteal veins. No calf vein thrombosis is detected.    Doppler examination shows normal spontaneous and phasic flow.    IMPRESSION:     No evidence of bilateral lower extremity deep venous thrombosis.      Assessment :   63 YO M obese chronic stasis admitted with recurrent Left LE cellulitis  Likely strep vs staph  Clinically better    Plan :   On Ancef  Can dc home on Keflex x 7 days   Encourage to elevate legs and consider compression stocking    Continue with present regiment.  Appropriate use of antibiotics and adverse effects reviewed.    D/w Hospitalist team    I have discussed the above plan of care with patient in detail. He expressed understanding of the treatment plan . Risks, benefits and alternatives discussed in detail.   I have asked if he hasany questions or concerns and appropriately addressed them to the best of my ability .      > 45 minutes spent in direct patient care reviewing  the notes, lab data/ imaging , discussion with multidisciplinary team. All questions were addressed and answered to the best of my capacity .    Thank you for allowing me to participate in the care of your patient .      Tanya Rios MD  Infectious Disease  [phone]

## 2018-01-24 NOTE — DIETITIAN INITIAL EVALUATION ADULT. - OTHER INFO
Pt reports diet hx revealing eats oatmeal qam, not a lunch eater but tries to eat a piece of fruit or something before his insulin shot, then a good well balanced dinner. Has a nighttime snack around 10pm usually a sweet, ie. Sejal bar or cupcake, or italian ice. Knows he shouldn't eat this but stated if he tries to eat something else he ends up having 5 other things to make up for what he really wanted.  Diet education declined.  Observed pts diet noncompliance today when at 10am pt was seen eating a HS snack from panty (cheese s/w, applesauce, quin crx, milk). Pt stated the bfst served was a joke.

## 2018-02-04 ENCOUNTER — INPATIENT (INPATIENT)
Facility: HOSPITAL | Age: 63
LOS: 1 days | Discharge: HOME | DRG: 441 | End: 2018-02-06
Attending: FAMILY MEDICINE | Admitting: HOSPITALIST
Payer: MEDICARE

## 2018-02-04 VITALS
DIASTOLIC BLOOD PRESSURE: 79 MMHG | HEIGHT: 72 IN | TEMPERATURE: 99 F | WEIGHT: 315 LBS | HEART RATE: 99 BPM | SYSTOLIC BLOOD PRESSURE: 149 MMHG | OXYGEN SATURATION: 95 % | RESPIRATION RATE: 14 BRPM

## 2018-02-04 DIAGNOSIS — Z90.49 ACQUIRED ABSENCE OF OTHER SPECIFIED PARTS OF DIGESTIVE TRACT: Chronic | ICD-10-CM

## 2018-02-04 LAB
ACETONE SERPL-MCNC: NEGATIVE — SIGNIFICANT CHANGE UP
ALBUMIN SERPL ELPH-MCNC: 3 G/DL — LOW (ref 3.3–5)
ALP SERPL-CCNC: 241 U/L — HIGH (ref 40–120)
ALT FLD-CCNC: 29 U/L — SIGNIFICANT CHANGE UP (ref 12–78)
AMMONIA BLD-MCNC: 134 UMOL/L — HIGH (ref 11–32)
ANION GAP SERPL CALC-SCNC: 9 MMOL/L — SIGNIFICANT CHANGE UP (ref 5–17)
APPEARANCE UR: ABNORMAL
APTT BLD: 37.5 SEC — HIGH (ref 27.5–37.4)
AST SERPL-CCNC: 40 U/L — HIGH (ref 15–37)
BASOPHILS # BLD AUTO: 0.1 K/UL — SIGNIFICANT CHANGE UP (ref 0–0.2)
BASOPHILS NFR BLD AUTO: 1.6 % — SIGNIFICANT CHANGE UP (ref 0–2)
BILIRUB SERPL-MCNC: 2.2 MG/DL — HIGH (ref 0.2–1.2)
BILIRUB UR-MCNC: ABNORMAL
BUN SERPL-MCNC: 16 MG/DL — SIGNIFICANT CHANGE UP (ref 7–23)
CALCIUM SERPL-MCNC: 8.7 MG/DL — SIGNIFICANT CHANGE UP (ref 8.5–10.1)
CHLORIDE SERPL-SCNC: 107 MMOL/L — SIGNIFICANT CHANGE UP (ref 96–108)
CO2 SERPL-SCNC: 23 MMOL/L — SIGNIFICANT CHANGE UP (ref 22–31)
COLOR SPEC: YELLOW — SIGNIFICANT CHANGE UP
CREAT SERPL-MCNC: 0.91 MG/DL — SIGNIFICANT CHANGE UP (ref 0.5–1.3)
DIFF PNL FLD: ABNORMAL
EOSINOPHIL # BLD AUTO: 0.2 K/UL — SIGNIFICANT CHANGE UP (ref 0–0.5)
EOSINOPHIL NFR BLD AUTO: 3.4 % — SIGNIFICANT CHANGE UP (ref 0–6)
GLUCOSE SERPL-MCNC: 242 MG/DL — HIGH (ref 70–99)
GLUCOSE UR QL: 1000 MG/DL
HCT VFR BLD CALC: 36.8 % — LOW (ref 39–50)
HGB BLD-MCNC: 12.2 G/DL — LOW (ref 13–17)
INR BLD: 1.27 RATIO — HIGH (ref 0.88–1.16)
KETONES UR-MCNC: NEGATIVE — SIGNIFICANT CHANGE UP
LACTATE SERPL-SCNC: 2 MMOL/L — SIGNIFICANT CHANGE UP (ref 0.7–2)
LEUKOCYTE ESTERASE UR-ACNC: NEGATIVE — SIGNIFICANT CHANGE UP
LYMPHOCYTES # BLD AUTO: 1.9 K/UL — SIGNIFICANT CHANGE UP (ref 1–3.3)
LYMPHOCYTES # BLD AUTO: 37.2 % — SIGNIFICANT CHANGE UP (ref 13–44)
MCHC RBC-ENTMCNC: 32.4 PG — SIGNIFICANT CHANGE UP (ref 27–34)
MCHC RBC-ENTMCNC: 33.2 GM/DL — SIGNIFICANT CHANGE UP (ref 32–36)
MCV RBC AUTO: 97.7 FL — SIGNIFICANT CHANGE UP (ref 80–100)
MONOCYTES # BLD AUTO: 0.6 K/UL — SIGNIFICANT CHANGE UP (ref 0–0.9)
MONOCYTES NFR BLD AUTO: 10.8 % — HIGH (ref 1–9)
NEUTROPHILS # BLD AUTO: 2.5 K/UL — SIGNIFICANT CHANGE UP (ref 1.8–7.4)
NEUTROPHILS NFR BLD AUTO: 47 % — SIGNIFICANT CHANGE UP (ref 43–77)
NITRITE UR-MCNC: NEGATIVE — SIGNIFICANT CHANGE UP
NT-PROBNP SERPL-SCNC: 83 PG/ML — SIGNIFICANT CHANGE UP (ref 0–125)
PH UR: 8 — SIGNIFICANT CHANGE UP (ref 5–8)
PLATELET # BLD AUTO: 113 K/UL — LOW (ref 150–400)
POTASSIUM SERPL-MCNC: 4.1 MMOL/L — SIGNIFICANT CHANGE UP (ref 3.5–5.3)
POTASSIUM SERPL-SCNC: 4.1 MMOL/L — SIGNIFICANT CHANGE UP (ref 3.5–5.3)
PROT SERPL-MCNC: 7.1 G/DL — SIGNIFICANT CHANGE UP (ref 6–8.3)
PROT UR-MCNC: NEGATIVE — SIGNIFICANT CHANGE UP
PROTHROM AB SERPL-ACNC: 13.9 SEC — HIGH (ref 9.8–12.7)
RBC # BLD: 3.76 M/UL — LOW (ref 4.2–5.8)
RBC # FLD: 13 % — SIGNIFICANT CHANGE UP (ref 10.3–14.5)
SODIUM SERPL-SCNC: 139 MMOL/L — SIGNIFICANT CHANGE UP (ref 135–145)
SP GR SPEC: 1.02 — SIGNIFICANT CHANGE UP (ref 1.01–1.02)
TROPONIN I SERPL-MCNC: <.015 NG/ML — SIGNIFICANT CHANGE UP (ref 0.01–0.04)
UROBILINOGEN FLD QL: NEGATIVE — SIGNIFICANT CHANGE UP
WBC # BLD: 5.2 K/UL — SIGNIFICANT CHANGE UP (ref 3.8–10.5)
WBC # FLD AUTO: 5.2 K/UL — SIGNIFICANT CHANGE UP (ref 3.8–10.5)

## 2018-02-04 PROCEDURE — 93970 EXTREMITY STUDY: CPT | Mod: 26

## 2018-02-04 PROCEDURE — 71045 X-RAY EXAM CHEST 1 VIEW: CPT | Mod: 26

## 2018-02-04 PROCEDURE — 93010 ELECTROCARDIOGRAM REPORT: CPT

## 2018-02-04 RX ORDER — ALPRAZOLAM 0.25 MG
0.25 TABLET ORAL ONCE
Qty: 0 | Refills: 0 | Status: DISCONTINUED | OUTPATIENT
Start: 2018-02-04 | End: 2018-02-04

## 2018-02-04 RX ORDER — SODIUM CHLORIDE 9 MG/ML
1000 INJECTION INTRAMUSCULAR; INTRAVENOUS; SUBCUTANEOUS ONCE
Qty: 0 | Refills: 0 | Status: COMPLETED | OUTPATIENT
Start: 2018-02-04 | End: 2018-02-04

## 2018-02-04 RX ADMIN — SODIUM CHLORIDE 1000 MILLILITER(S): 9 INJECTION INTRAMUSCULAR; INTRAVENOUS; SUBCUTANEOUS at 21:30

## 2018-02-04 NOTE — ED PROVIDER NOTE - NEUROLOGICAL, MLM
Alert and oriented, no focal deficits, no motor or sensory deficits. cn2-12 intact nihss=0 but pt was ambulated for safety as he is very obese.

## 2018-02-04 NOTE — ED PROVIDER NOTE - CHPI ED SYMPTOMS NEG
no blurred vision/no numbness/no dizziness/no weakness/no fever/no nausea/no vomiting/no loss of consciousness

## 2018-02-04 NOTE — ED PROVIDER NOTE - CONSTITUTIONAL, MLM
normal... Well appearing, obese well nourished, awake, alert, oriented to person, place,  and in no apparent distress.

## 2018-02-04 NOTE — ED ADULT TRIAGE NOTE - CHIEF COMPLAINT QUOTE
"He has been off all day."  per wife, pt has been disoriented all day, not able to dress self, making hot soup, discarding it then eating cold soup from can, forgetting to put on pants, wife states pt told her he had a headache earlier today

## 2018-02-04 NOTE — ED PROVIDER NOTE - OBJECTIVE STATEMENT
Outpatient Providers	PMD- Dr. Brian Coto ([phone])  Derm - Dr. Aminata Coto  Endo-Dr. Mayer  Vascular surgeon- Dr. Juárez ([place]) Pt is a 61 yo male who was discharged from this facility on 1/24 after admission to hospitalist service for cellulitis of the lle. he is a diabetic poorly controlled. he was doing well and improving until this am.  Since his awakening today he was confused.  His wife noticed that after he got dressed he walked out of the bedroom without his pants.  eventually he dressed but put on extra shirts, then left room and went to put his coat on.   this confused behavior lased all day with trying to go to bathroom in the bedroom, taking dishes from the table to bedroom to put away and clean.  finally ekaterina his son complained that something was wrong with dad.  Outpatient Providers	PMD- Dr. Brian Coto ([phone])  Derm - Dr. Aminata Coto  Endo-Dr. Mayer  Vascular surgeon- Dr. Juárez ([place])

## 2018-02-05 ENCOUNTER — TRANSCRIPTION ENCOUNTER (OUTPATIENT)
Age: 63
End: 2018-02-05

## 2018-02-05 DIAGNOSIS — E11.42 TYPE 2 DIABETES MELLITUS WITH DIABETIC POLYNEUROPATHY: ICD-10-CM

## 2018-02-05 DIAGNOSIS — E78.2 MIXED HYPERLIPIDEMIA: ICD-10-CM

## 2018-02-05 DIAGNOSIS — R41.82 ALTERED MENTAL STATUS, UNSPECIFIED: ICD-10-CM

## 2018-02-05 DIAGNOSIS — F05 DELIRIUM DUE TO KNOWN PHYSIOLOGICAL CONDITION: ICD-10-CM

## 2018-02-05 DIAGNOSIS — Z29.9 ENCOUNTER FOR PROPHYLACTIC MEASURES, UNSPECIFIED: ICD-10-CM

## 2018-02-05 DIAGNOSIS — R74.8 ABNORMAL LEVELS OF OTHER SERUM ENZYMES: ICD-10-CM

## 2018-02-05 DIAGNOSIS — K72.90 HEPATIC FAILURE, UNSPECIFIED WITHOUT COMA: ICD-10-CM

## 2018-02-05 DIAGNOSIS — I10 ESSENTIAL (PRIMARY) HYPERTENSION: ICD-10-CM

## 2018-02-05 PROBLEM — G62.9 POLYNEUROPATHY, UNSPECIFIED: Chronic | Status: ACTIVE | Noted: 2018-01-23

## 2018-02-05 PROBLEM — E78.00 PURE HYPERCHOLESTEROLEMIA, UNSPECIFIED: Chronic | Status: ACTIVE | Noted: 2018-01-23

## 2018-02-05 LAB
ALBUMIN SERPL ELPH-MCNC: 3.1 G/DL — LOW (ref 3.3–5)
ALP SERPL-CCNC: 185 U/L — HIGH (ref 40–120)
ALT FLD-CCNC: 30 U/L — SIGNIFICANT CHANGE UP (ref 12–78)
ANION GAP SERPL CALC-SCNC: 8 MMOL/L — SIGNIFICANT CHANGE UP (ref 5–17)
AST SERPL-CCNC: 39 U/L — HIGH (ref 15–37)
BACTERIA # UR AUTO: NEGATIVE — SIGNIFICANT CHANGE UP
BILIRUB SERPL-MCNC: 2.7 MG/DL — HIGH (ref 0.2–1.2)
BUN SERPL-MCNC: 14 MG/DL — SIGNIFICANT CHANGE UP (ref 7–23)
CALCIUM SERPL-MCNC: 8.7 MG/DL — SIGNIFICANT CHANGE UP (ref 8.5–10.1)
CERULOPLASMIN SERPL-MCNC: 21 MG/DL — SIGNIFICANT CHANGE UP (ref 20–60)
CHLORIDE SERPL-SCNC: 107 MMOL/L — SIGNIFICANT CHANGE UP (ref 96–108)
CO2 SERPL-SCNC: 25 MMOL/L — SIGNIFICANT CHANGE UP (ref 22–31)
CREAT SERPL-MCNC: 0.84 MG/DL — SIGNIFICANT CHANGE UP (ref 0.5–1.3)
EPI CELLS # UR: NEGATIVE — SIGNIFICANT CHANGE UP
GLUCOSE SERPL-MCNC: 216 MG/DL — HIGH (ref 70–99)
HAV IGM SER-ACNC: SIGNIFICANT CHANGE UP
HBV CORE IGM SER-ACNC: SIGNIFICANT CHANGE UP
HBV SURFACE AG SER-ACNC: SIGNIFICANT CHANGE UP
HCT VFR BLD CALC: 35.7 % — LOW (ref 39–50)
HCV AB S/CO SERPL IA: 0.14 S/CO — SIGNIFICANT CHANGE UP
HCV AB SERPL-IMP: SIGNIFICANT CHANGE UP
HGB BLD-MCNC: 11.9 G/DL — LOW (ref 13–17)
INR BLD: 1.33 RATIO — HIGH (ref 0.88–1.16)
MCHC RBC-ENTMCNC: 32.8 PG — SIGNIFICANT CHANGE UP (ref 27–34)
MCHC RBC-ENTMCNC: 33.3 GM/DL — SIGNIFICANT CHANGE UP (ref 32–36)
MCV RBC AUTO: 98.5 FL — SIGNIFICANT CHANGE UP (ref 80–100)
PLATELET # BLD AUTO: 117 K/UL — LOW (ref 150–400)
POTASSIUM SERPL-MCNC: 4 MMOL/L — SIGNIFICANT CHANGE UP (ref 3.5–5.3)
POTASSIUM SERPL-SCNC: 4 MMOL/L — SIGNIFICANT CHANGE UP (ref 3.5–5.3)
PROT SERPL-MCNC: 7 G/DL — SIGNIFICANT CHANGE UP (ref 6–8.3)
PROTHROM AB SERPL-ACNC: 14.6 SEC — HIGH (ref 9.8–12.7)
RAPID RVP RESULT: SIGNIFICANT CHANGE UP
RBC # BLD: 3.63 M/UL — LOW (ref 4.2–5.8)
RBC # FLD: 13.1 % — SIGNIFICANT CHANGE UP (ref 10.3–14.5)
RBC CASTS # UR COMP ASSIST: SIGNIFICANT CHANGE UP /HPF (ref 0–4)
SODIUM SERPL-SCNC: 140 MMOL/L — SIGNIFICANT CHANGE UP (ref 135–145)
WBC # BLD: 4.6 K/UL — SIGNIFICANT CHANGE UP (ref 3.8–10.5)
WBC # FLD AUTO: 4.6 K/UL — SIGNIFICANT CHANGE UP (ref 3.8–10.5)
WBC UR QL: NEGATIVE — SIGNIFICANT CHANGE UP

## 2018-02-05 PROCEDURE — 12345: CPT | Mod: NC

## 2018-02-05 PROCEDURE — 99285 EMERGENCY DEPT VISIT HI MDM: CPT

## 2018-02-05 PROCEDURE — 76700 US EXAM ABDOM COMPLETE: CPT | Mod: 26

## 2018-02-05 PROCEDURE — 70450 CT HEAD/BRAIN W/O DYE: CPT | Mod: 26

## 2018-02-05 PROCEDURE — 99223 1ST HOSP IP/OBS HIGH 75: CPT | Mod: AI

## 2018-02-05 RX ORDER — INSULIN LISPRO 100/ML
VIAL (ML) SUBCUTANEOUS
Qty: 0 | Refills: 0 | Status: DISCONTINUED | OUTPATIENT
Start: 2018-02-05 | End: 2018-02-06

## 2018-02-05 RX ORDER — SODIUM CHLORIDE 9 MG/ML
1000 INJECTION, SOLUTION INTRAVENOUS
Qty: 0 | Refills: 0 | Status: DISCONTINUED | OUTPATIENT
Start: 2018-02-05 | End: 2018-02-06

## 2018-02-05 RX ORDER — GLUCAGON INJECTION, SOLUTION 0.5 MG/.1ML
1 INJECTION, SOLUTION SUBCUTANEOUS ONCE
Qty: 0 | Refills: 0 | Status: DISCONTINUED | OUTPATIENT
Start: 2018-02-05 | End: 2018-02-06

## 2018-02-05 RX ORDER — LOSARTAN POTASSIUM 100 MG/1
100 TABLET, FILM COATED ORAL DAILY
Qty: 0 | Refills: 0 | Status: DISCONTINUED | OUTPATIENT
Start: 2018-02-05 | End: 2018-02-05

## 2018-02-05 RX ORDER — DEXTROSE 50 % IN WATER 50 %
12.5 SYRINGE (ML) INTRAVENOUS ONCE
Qty: 0 | Refills: 0 | Status: DISCONTINUED | OUTPATIENT
Start: 2018-02-05 | End: 2018-02-06

## 2018-02-05 RX ORDER — ALPRAZOLAM 0.25 MG
0.25 TABLET ORAL ONCE
Qty: 0 | Refills: 0 | Status: DISCONTINUED | OUTPATIENT
Start: 2018-02-05 | End: 2018-02-05

## 2018-02-05 RX ORDER — INSULIN GLARGINE 100 [IU]/ML
80 INJECTION, SOLUTION SUBCUTANEOUS AT BEDTIME
Qty: 0 | Refills: 0 | Status: DISCONTINUED | OUTPATIENT
Start: 2018-02-05 | End: 2018-02-06

## 2018-02-05 RX ORDER — LACTULOSE 10 G/15ML
20 SOLUTION ORAL ONCE
Qty: 0 | Refills: 0 | Status: COMPLETED | OUTPATIENT
Start: 2018-02-05 | End: 2018-02-05

## 2018-02-05 RX ORDER — ASPIRIN/CALCIUM CARB/MAGNESIUM 324 MG
81 TABLET ORAL DAILY
Qty: 0 | Refills: 0 | Status: DISCONTINUED | OUTPATIENT
Start: 2018-02-05 | End: 2018-02-06

## 2018-02-05 RX ORDER — LOSARTAN POTASSIUM 100 MG/1
100 TABLET, FILM COATED ORAL DAILY
Qty: 0 | Refills: 0 | Status: DISCONTINUED | OUTPATIENT
Start: 2018-02-06 | End: 2018-02-06

## 2018-02-05 RX ORDER — LACTULOSE 10 G/15ML
20 SOLUTION ORAL THREE TIMES A DAY
Qty: 0 | Refills: 0 | Status: DISCONTINUED | OUTPATIENT
Start: 2018-02-05 | End: 2018-02-06

## 2018-02-05 RX ORDER — GABAPENTIN 400 MG/1
800 CAPSULE ORAL
Qty: 0 | Refills: 0 | Status: DISCONTINUED | OUTPATIENT
Start: 2018-02-05 | End: 2018-02-06

## 2018-02-05 RX ORDER — DEXTROSE 50 % IN WATER 50 %
1 SYRINGE (ML) INTRAVENOUS ONCE
Qty: 0 | Refills: 0 | Status: DISCONTINUED | OUTPATIENT
Start: 2018-02-05 | End: 2018-02-06

## 2018-02-05 RX ADMIN — GABAPENTIN 800 MILLIGRAM(S): 400 CAPSULE ORAL at 13:19

## 2018-02-05 RX ADMIN — LOSARTAN POTASSIUM 100 MILLIGRAM(S): 100 TABLET, FILM COATED ORAL at 05:54

## 2018-02-05 RX ADMIN — Medication 2: at 13:34

## 2018-02-05 RX ADMIN — Medication 4: at 08:25

## 2018-02-05 RX ADMIN — Medication 81 MILLIGRAM(S): at 13:19

## 2018-02-05 RX ADMIN — INSULIN GLARGINE 80 UNIT(S): 100 INJECTION, SOLUTION SUBCUTANEOUS at 21:44

## 2018-02-05 RX ADMIN — LACTULOSE 20 GRAM(S): 10 SOLUTION ORAL at 03:33

## 2018-02-05 RX ADMIN — GABAPENTIN 800 MILLIGRAM(S): 400 CAPSULE ORAL at 17:23

## 2018-02-05 RX ADMIN — Medication 2: at 17:21

## 2018-02-05 RX ADMIN — Medication 0.25 MILLIGRAM(S): at 00:09

## 2018-02-05 RX ADMIN — LACTULOSE 20 GRAM(S): 10 SOLUTION ORAL at 21:44

## 2018-02-05 RX ADMIN — GABAPENTIN 800 MILLIGRAM(S): 400 CAPSULE ORAL at 05:54

## 2018-02-05 RX ADMIN — LACTULOSE 20 GRAM(S): 10 SOLUTION ORAL at 13:19

## 2018-02-05 NOTE — DISCHARGE NOTE ADULT - HOSPITAL COURSE
63yo M w/ PMH of uncontrolled DM2 with neuropathy, HLD and HTN presented with altered mental status secondary to hepatic encephalopathy. Serum ammonium was found to be elevated. Patient was started on Lactulose and Rifaximin. Mental status improved. Hepatitis panel was negative. Utox showed _________. CT head negative. Pt was unable to tolerate MRI due to claustrophobia. CXR negative. Neurology, Dr. Peña and Psychiatry, Dr. Cherry consulted. 61yo M w/ PMH of uncontrolled DM2 with neuropathy, HLD and HTN presented with altered mental status secondary to hepatic encephalopathy. Serum ammonium was found to be elevated. Patient was started on Lactulose and Rifaximin. Mental status improved. Hepatitis panel was negative. Utox showed _________. CT head negative. Pt was unable to tolerate MRI due to claustrophobia. Abdominal US showed coarsened echotexure of liver. CXR negative. Neurology, Dr. Pñea and Psychiatry, Dr. Cherry consulted. 63yo M w/ PMH of uncontrolled DM2 with neuropathy, HLD and HTN presented with altered mental status secondary to hepatic encephalopathy. Serum ammonium was found to be elevated. Patient was started on Lactulose and Rifaximin. Mental status improved. Hepatitis panel was negative. CT head negative. Pt was unable to tolerate MRI due to claustrophobia. Abdominal US showed coarsened echotexure of liver. CXR negative. Neurology, Dr. Peañ and Psychiatry, Dr. Cherry consulted. Pt mentation improved and ammonia decreased, pt instructed to continue lactulose and rifaximin with close follow up with GI and PMD.      Time spent: 60 minutes  PMD offince informed of discharge 63yo M w/ PMH of uncontrolled DM2 with neuropathy, HLD and HTN presented with altered mental status secondary to hepatic encephalopathy. Serum ammonium was found to be elevated. Patient was started on Lactulose and Rifaximin. Mental status improved. Hepatitis panel was negative. CT head negative. Pt was unable to tolerate MRI due to claustrophobia. Abdominal US showed coarsened echotexure of liver, poss be due to NAFLD. CXR negative. Neurology, Dr. Peña and Psychiatry,  states this is likely due to elevated ammonia levels. Pt mentation improved and ammonia decreased, pt instructed to continue lactulose and rifaximin with close follow up with GI and PMD.      Time spent: 60 minutes  PMD offince informed of discharge

## 2018-02-05 NOTE — CONSULT NOTE ADULT - ASSESSMENT
CMS suspect metabolic from high NH3 levels   GI work up as needed  MS appears better today   spoke to wife at bedside
62 year old male presented with altered mental status, found to have hepatic encephalopathy

## 2018-02-05 NOTE — H&P ADULT - NSHPOUTPATIENTPROVIDERS_GEN_ALL_CORE
PMD- Dr. Brian Coto ([phone])  Derm - Dr. Aminata Coto  Endo-Dr. Mayer  Vascular surgeon- Dr. Juárez ([place])

## 2018-02-05 NOTE — BEHAVIORAL HEALTH ASSESSMENT NOTE - SUMMARY
63yo M w/ PMH of uncontrolled DM2 with neuropathy, HLD and HTN and recent admission for left leg cellulitis. brought to ED by wife with reports of confusion and odd behavior for 1 day. He appears to have waxing and waning of mental status, confusion, but seems to be improving somewhat. No acute psychiatric issues of danger.

## 2018-02-05 NOTE — DISCHARGE NOTE ADULT - CARE PROVIDER_API CALL
Brian Coto  [address] [place] [address]  Phone: [phone]  Fax: (   )    - Brian Coto  [address] [place] [address]  Phone: [phone]  Fax: (   )    -    Javy Morales (DO), Gastroenterology; Internal Medicine  [address] [place] [address]  Phone: [phone]  Fax: [phone]

## 2018-02-05 NOTE — H&P ADULT - NSHPPHYSICALEXAM_GEN_ALL_CORE
General: Well developed, well nourished, NAD  HEENT: anicteric NCAT, PERRLA, EOMI bl, moist mucous membranes   Neck: Supple, nontender, no mass  Neurology: A&Ox1, mildy agitated, at times uncooperative with exam  Respiratory: CTA B/L, No W/R/R  CV: RRR, +S1/S2, no murmurs, rubs or gallops  Abdominal: obese Soft, NT, ND +BSx4, large ventral hernia (vs diestasis recti) in midline   Extremities: 2etib edema LE bilat Markus neg+ peripheral pulses  MSK: Normal ROM, no joint erythema or warmth, no joint swelling   Skin: warm, dry, normal color, no rash or abnormal lesions

## 2018-02-05 NOTE — BEHAVIORAL HEALTH ASSESSMENT NOTE - HPI (INCLUDE ILLNESS QUALITY, SEVERITY, DURATION, TIMING, CONTEXT, MODIFYING FACTORS, ASSOCIATED SIGNS AND SYMPTOMS)
61yo M w/ PMH of uncontrolled DM2 with neuropathy, HLD and HTN and recent admission for left leg cellulitis. brought to ED by wife with reports of confusion and odd behavior for 1 day. Per wife, patient was walking around house with no pants and only shirt, and when prompted to put on his pants, he applied pant sleeves to his arms. Also, he attempted to urinate on the bedroom floor. At time of exam, he is alert X 1 and admits to some confusion, but denies these events. He has multiple medical issues and was being treated for cellulitis recently but came in with confusion, and elevated Ammonia levels. He has been getting worked up and is seen by neuro and GI for hepatic encephalopathy.  The Pt was seen today and his wife stated that he was confused this morning but since he went to the bathroom this afternoon he has been better. He remembers some of what happed but details are fuzzy to him. He says that he was confused but cant say why. He was oriented x 3 and had some short term and remote recall. He recognizes people. He denies any acute depression, psychosis, dejon. He admits to being very claustrophobic.

## 2018-02-05 NOTE — PROGRESS NOTE ADULT - PROBLEM SELECTOR PLAN 1
Lactulose 30mL TID  US liver- dilated CBD, post cholecystectomy.   -f/u Acute hep panel  -f/u lipid panel  -GI- Sideridis consulted  - Neuro- Dr. Peña consulted. Appreciate recs.   - Psych- [facility] consulted.   -Pt unable to tolerate MRI due to claustrophobia Continue lactulose. GI added rifaximin  US liver- dilated CBD, post cholecystectomy.   - hepatitis panel nonreactive  -f/u utox  -f/u lipid panel  -GI- Carmen consulted  - Neuro- Dr. Peña consulted. Appreciate recs.   - Psych- [facility] consulted.   -Pt unable to tolerate MRI due to claustrophobia

## 2018-02-05 NOTE — CONSULT NOTE ADULT - SUBJECTIVE AND OBJECTIVE BOX
.
Chief Complaint:  Patient is a 62y old  Male who presents with a chief complaint of cellulitis (2018 12:09)      HPI:61yo M w/ PMH of uncontrolled DM2 with neuropathy, HLD and HTN and recent admission for left leg cellulitis. brought to ED by wife with reports of confusion and odd behavior for 1 day. Per wife, patient was walking around house with no pants and only shirt, and when prompted to put on his pants, he applied pant sleeves to his arms. Also, he attempted to urinate on the bedroom floor. At time of exam, he is alert X 1 and admits to some confusion, but denies these events. Currently, denies CP SOB fever chills NVD, or sick contacts. he denies dizziness or seizures, but confirms he had a mild headache this morning.  He denies diarrhea, melena hematochezia, hematemesis or easy bruising. Also denies acholic stools or dark urine. denies jaundice.     On admission ammonia level 134. As per pt and wife LFTs have been elevated in the past and he was told he had fatty liver.     Allergies:  codeine (Anaphylaxis)      Medications:  aspirin enteric coated 81 milliGRAM(s) Oral daily  dextrose 5%. 1000 milliLiter(s) IV Continuous <Continuous>  dextrose 50% Injectable 12.5 Gram(s) IV Push once  dextrose Gel 1 Dose(s) Oral once PRN  gabapentin 800 milliGRAM(s) Oral four times a day  glucagon  Injectable 1 milliGRAM(s) IntraMuscular once PRN  insulin glargine Injectable (LANTUS) 80 Unit(s) SubCutaneous at bedtime  insulin lispro (HumaLOG) corrective regimen sliding scale   SubCutaneous three times a day before meals  lactulose Syrup 20 Gram(s) Oral three times a day  rifaximin 550 milliGRAM(s) Oral two times a day      PMHX/PSHX:  Renal stones  Hypertension  Neuropathy  Hypercholesteremia  Diabetes  S/P cholecystectomy  No significant past surgical history      Family history:  No pertinent family history in first degree relatives      Social History: Retired.   Tobacco: never smoker  Alcohol: socially, last drink 2017  Drugs: denies    ROS:     General:  No wt loss, fevers, chills, night sweats, fatigue,   Eyes:  Good vision, no reported pain  ENT:  No sore throat, pain, runny nose, dysphagia  CV:  No pain, palpitations, hypo/hypertension  Resp:  No dyspnea, cough, tachypnea, wheezing  GI:  No pain, No nausea, No vomiting, No diarrhea, No constipation, No weight loss, No fever, No pruritis, No rectal bleeding, No tarry stools, No dysphagia,  :  No pain, bleeding, incontinence, nocturia  Muscle:  No pain, weakness  Neuro:  No weakness, tingling, memory problems  Psych:  No fatigue, insomnia, mood problems, depression  Endocrine:  No polyuria, polydipsia, cold/heat intolerance  Heme:  No petechiae, ecchymosis, easy bruisability  Skin:  No rash, tattoos, scars, edema      PHYSICAL EXAM:   Vital Signs:  Vital Signs Last 24 Hrs  T(C): 37 (2018 06:47), Max: 37.4 (2018 23:32)  T(F): 98.6 (2018 06:47), Max: 99.3 (2018 23:32)  HR: 95 (:47) (94 - 101)  BP: 164/86 (2018 06:47) (139/66 - 164/86)  BP(mean): --  RR: 18 (2018 06:47) (14 - 18)  SpO2: 96% (:47) (95% - 96%)  Daily Height in cm: 182.88 (2018 20:40)    Daily Weight in k.8 (2018 06:47)    GENERAL:  Appears stated age, well-groomed, well-nourished, no distress  HEENT:  NC/AT,  conjunctivae clear and pink, no thyromegaly, nodules, adenopathy, no JVD, sclera -anicteric  CHEST:  Full & symmetric excursion, no increased effort, breath sounds clear  HEART:  Regular rhythm, S1, S2, no murmur/rub/S3/S4, no abdominal bruit, no edema  ABDOMEN:  Soft, non-tender, non-distended, normoactive bowel sounds,  no masses ,no hepato-splenomegaly, no signs of chronic liver disease  EXTREMITIES:  no cyanosis, clubbing or edema  SKIN:  No rash/erythema/ecchymoses/petechiae/wounds/abscess/warm/dry  NEURO:  Alert, oriented,  asterixis, no tremor, no encephalopathy    LABS:                        11.9   4.6   )-----------( 117      ( 2018 09:59 )             35.7     -    140  |  107  |  14  ----------------------------<  216<H>  4.0   |  25  |  0.84    Ca    8.7      2018 09:59    TPro  7.0  /  Alb  3.1<L>  /  TBili  2.7<H>  /  DBili  x   /  AST  39<H>  /  ALT  30  /  AlkPhos  185<H>  02    LIVER FUNCTIONS - ( 2018 09:59 )  Alb: 3.1 g/dL / Pro: 7.0 g/dL / ALK PHOS: 185 U/L / ALT: 30 U/L / AST: 39 U/L / GGT: x           PT/INR - ( 2018 04:45 )   PT: 14.6 sec;   INR: 1.33 ratio         PTT - ( 2018 21:33 )  PTT:37.5 sec  Urinalysis Basic - ( 2018 23:32 )    Color: Yellow / Appearance: Slightly Turbid / S.020 / pH: x  Gluc: x / Ketone: Negative  / Bili: Small / Urobili: Negative   Blood: x / Protein: Negative / Nitrite: Negative   Leuk Esterase: Negative / RBC: 0-2 /HPF / WBC Negative   Sq Epi: x / Non Sq Epi: Negative / Bacteria: Negative      Amylase Serum--      Lipase serum--       [id number]      Imaging:

## 2018-02-05 NOTE — H&P ADULT - PROBLEM SELECTOR PLAN 6
IMPROVE VTE Individual Risk Assessment        RISK                                                          Points  [  ] Previous VTE                                                3  [  ] Thrombophilia                                             2  [  ] Lower limb paralysis                                    2        (unable to hold up >15 seconds)    [  ] Current Cancer                                             2         (within 6 months)    [  ] Immobilization > 24 hrs                              1  [  ] ICU/CCU stay > 24 hours                            1    [  x] Age > 60                                                    1  IMPROVE VTE Score _1. SCD________

## 2018-02-05 NOTE — H&P ADULT - NSHPLABSRESULTS_GEN_ALL_CORE
12.2   5.2   )-----------( 113      ( 2018 21:33 )             36.8           139  |  107  |  16  ----------------------------<  242<H>  4.1   |  23  |  0.91    Ca    8.7      2018 21:33    TPro  7.1  /  Alb  3.0<L>  /  TBili  2.2<H>  /  DBili  x   /  AST  40<H>  /  ALT  29  /  AlkPhos  241<H>                Urinalysis Basic - ( 2018 23:32 )    Color: Yellow / Appearance: Slightly Turbid / S.020 / pH: x  Gluc: x / Ketone: Negative  / Bili: Small / Urobili: Negative   Blood: x / Protein: Negative / Nitrite: Negative   Leuk Esterase: Negative / RBC: 0-2 /HPF / WBC Negative   Sq Epi: x / Non Sq Epi: Negative / Bacteria: Negative        PT/INR - ( 2018 21:33 )   PT: 13.9 sec;   INR: 1.27 ratio         PTT - ( 2018 21:33 )  PTT:37.5 sec    Lactate Trend   @ 21:33 Lactate:2.0       CARDIAC MARKERS ( 2018 21:33 )  <.015 ng/mL / x     / x     / x     / x            CAPILLARY BLOOD GLUCOSE      POCT Blood Glucose.: 239 mg/dL (2018 20:48)        Culture Results:   No growth at 5 days. ( @ 14:50)  Culture Results:   No growth at 5 days. ( @ 14:49)

## 2018-02-05 NOTE — DISCHARGE NOTE ADULT - ADDITIONAL INSTRUCTIONS
-follow up with GI/Hepatologist Dr. Morales within 2-3 days of discharge, have ammonia level checked, try to have open MRI if amendable  -follow up with PMD within 1 week of discharge

## 2018-02-05 NOTE — DISCHARGE NOTE ADULT - PLAN OF CARE
Resolution. You should continue Irbesartan 300mg daily. You should continue Pravastatin 40mg daily. You should continue Humalog 30 units before meals, Lantus 80 units at bedtime and metformin 1000mg twice daily. cont lactulose, rifaximin  follow up with GI within 2-3 days of discharge resolution -follow up with GI/Hepatologist (specialist of the liver) Dr. Morales stable cont lactulose, rifaximin; call dr montalvo office if develop diarrhea and consider decreasing lactulose dose  follow up with GI within 2-3 days of discharge You should continue Humalog 30 units before meals, Lantus 80 units at bedtime and metformin 1000mg twice daily.  follow up with your primary care doctor

## 2018-02-05 NOTE — DISCHARGE NOTE ADULT - MEDICATION SUMMARY - MEDICATIONS TO TAKE
I will START or STAY ON the medications listed below when I get home from the hospital:    Aspir 81 oral delayed release tablet  -- 1 tab(s) by mouth once a day  -- Indication: For Type 2 diabetes mellitus with diabetic polyneuropathy, with long-term current use of insulin    irbesartan 300 mg oral tablet  -- 1 tab(s) by mouth once a day  -- Indication: For Essential hypertension    gabapentin 800 mg oral tablet  -- orally 4 times a day  -- Indication: For Type 2 diabetes mellitus with diabetic polyneuropathy, with long-term current use of insulin    metFORMIN 1000 mg oral tablet  -- 1 tab(s) by mouth 2 times a day  -- Indication: For Type 2 diabetes mellitus with diabetic polyneuropathy, with long-term current use of insulin    Lantus 100 units/mL subcutaneous solution  -- 80  subcutaneous once a day (at bedtime)  -- Indication: For Type 2 diabetes mellitus with diabetic polyneuropathy, with long-term current use of insulin    HumaLOG 100 units/mL subcutaneous solution  -- 30 unit(s) subcutaneous 3 times a day  -- Indication: For Type 2 diabetes mellitus with diabetic polyneuropathy, with long-term current use of insulin    pravastatin 40 mg oral tablet  -- 1 tab(s) by mouth once a day  -- Indication: For Mixed hyperlipidemia    lactulose 10 g/15 mL oral syrup  -- 30 milliliter(s) by mouth 3 times a day  -- Indication: For Hepatic encephalopathy    rifAXIMin 550 mg oral tablet  -- 1 tab(s) by mouth 2 times a day  -- Indication: For Hepatic encephalopathy

## 2018-02-05 NOTE — DISCHARGE NOTE ADULT - PROVIDER TOKENS
FREE:[LAST:[Nnamdi],FIRST:[Brian],PHONE:[phone]],FAX:[(   )    -],ADDRESS:[[address] [place] [address]]] FREE:[LAST:[Nnamdi],FIRST:[Brian],PHONE:[phone]:[(   )    -],ADDRESS:[[address] [place] [address]]],TOKEN:'[id number]'

## 2018-02-05 NOTE — H&P ADULT - NSHPREVIEWOFSYSTEMS_GEN_ALL_CORE
Constitutional: denies fever, chills, general malaise, weight loss, weight gain, diaphoresis   HEENT: denies dry mouth, sore throat, runny nose, photophobia, blurry vision, double vision, discharge, eye pain, difficulty hearing, vertigo, dysphagia, epistaxis, recent dental work    Respiratory: denies SOB, MOYER, cough, sputum production, wheezing, hemoptysis  Cardiovascular: denies CP, palpitations, edema  Gastrointestinal: HPI  Genitourinary: denies dysuria, frequency, urgency, incontinence, hematuria   Skin/Breast: denies rash, hives, itching, masses, hair loss   Musculoskeletal: denies myalgias, arthritis, joint swelling, muscle weakness  Neurologic: HPI  Psychiatric: denies feeling anxious, depressed, suicidal, homicidal thoughts  Endocrine: denies increased fingerstick glucoses, cold or heat intolerance, polydipsia, polyuria, polyphagia   Hematology/Oncology: denies bruising, tender or enlarged lymph nodes   ROS negative except as noted above

## 2018-02-05 NOTE — PROGRESS NOTE ADULT - ASSESSMENT
61 YO M PMG DM2 on insulin, elevated liver transaminases, admitted with altered mental status likely 2/2 hepatic encephalopathy. 63 YO M PMH DM2 on insulin, elevated liver transaminases, admitted with altered mental status likely 2/2 hepatic encephalopathy.

## 2018-02-05 NOTE — PROGRESS NOTE ADULT - ATTENDING COMMENTS
63 YO M PMH DM2 on insulin, elevated liver transaminases, admitted with altered mental status likely 2/2 hepatic encephalopathy. Plan: apprec GI recs, monitro liver enzymes, monitor neuro status and apprec neuro recs, cont rifaximin and trend ammonia levels

## 2018-02-05 NOTE — CONSULT NOTE ADULT - PROBLEM SELECTOR RECOMMENDATION 9
Start Rifaximin 550 BID  continue Lactulose as ordered, titrate to 2-3 bms daily   monitor Ammonia over hospital course

## 2018-02-05 NOTE — CONSULT NOTE ADULT - PROBLEM SELECTOR RECOMMENDATION 2
? secondary to JEAN   acute viral hepatitis panel negative   f/u autoimmune work up--SRAVAN, AMA, Ceruloplasmin, ASCA, ANCA  MRCP ordered   f/u PT/INR, Trend LFTs ? secondary to JEAN   acute viral hepatitis panel negative   f/u autoimmune work up--SRAVAN, AMA, IGg subsets, ASCA, ANCA  MRCP ordered   f/u PT/INR, Trend LFTs

## 2018-02-05 NOTE — DISCHARGE NOTE ADULT - SECONDARY DIAGNOSIS.
Elevated liver enzymes Hypertension Hypercholesteremia Type 2 diabetes mellitus with diabetic polyneuropathy, with long-term current use of insulin

## 2018-02-05 NOTE — H&P ADULT - ATTENDING COMMENTS
Seen and examined by attending MD, note authored by attending. . Case discussed with patient and wife  who voiced understanding and agreement to aforementioned plan.

## 2018-02-05 NOTE — DISCHARGE NOTE ADULT - ABILITY TO HEAR (WITH HEARING AID OR HEARING APPLIANCE IF NORMALLY USED):
difficulty hearing out of right ear/Mildly to Moderately Impaired: difficulty hearing in some environments or speaker may need to increase volume or speak distinctly

## 2018-02-05 NOTE — H&P ADULT - HISTORY OF PRESENT ILLNESS
61yo M w/ PMH of uncontrolled DM2 with neuropathy, HLD and HTN and recent admission for left leg cellulitis. [facility] ED by wife with reports of confusion and odd behavior for 1 day. Per wife, patient was walking around house with no pants and only shirt, and when prompted to put on his pants, he applied pantsleeves to his arms. Also, he attempted to urinate on the bedroom floor. At time of exam, he is alert X 1 and admits to some confusion, but denies these events. Currently, denies CP SOB fever chills NVD, or sick contacts. he denies dizziness or seizures, but confirms he had a mild headache this morning.  He denies diarrhea, melena hematochezia, hematemesis or easy bruising. Also denies acholic stools or dark urine. denies jaundice.

## 2018-02-05 NOTE — DISCHARGE NOTE ADULT - NS AS ACTIVITY OBS
Do not drive or operate machinery/until seen by GI and PMD/Do not make important decisions/No Heavy lifting/straining

## 2018-02-05 NOTE — DISCHARGE NOTE ADULT - PATIENT PORTAL LINK FT
You can access the [facility], offered by [facility], by registering with the following website: [email or web address]

## 2018-02-05 NOTE — DISCHARGE NOTE ADULT - CARE PLAN
Principal Discharge DX:	Hepatic encephalopathy  Goal:	Resolution.  Secondary Diagnosis:	Elevated liver enzymes  Secondary Diagnosis:	Hypertension  Assessment and plan of treatment:	You should continue Irbesartan 300mg daily.  Secondary Diagnosis:	Hypercholesteremia  Assessment and plan of treatment:	You should continue Pravastatin 40mg daily.  Secondary Diagnosis:	Type 2 diabetes mellitus with diabetic polyneuropathy, with long-term current use of insulin  Assessment and plan of treatment:	You should continue Humalog 30 units before meals, Lantus 80 units at bedtime and metformin 1000mg twice daily. Principal Discharge DX:	Hepatic encephalopathy  Goal:	Resolution.  Assessment and plan of treatment:	cont lactulose, rifaximin  follow up with GI within 2-3 days of discharge  Secondary Diagnosis:	Elevated liver enzymes  Goal:	resolution  Assessment and plan of treatment:	-follow up with GI/Hepatologist (specialist of the liver) Dr. Morales  Secondary Diagnosis:	Hypertension  Goal:	stable  Assessment and plan of treatment:	You should continue Irbesartan 300mg daily.  Secondary Diagnosis:	Hypercholesteremia  Goal:	stable  Assessment and plan of treatment:	You should continue Pravastatin 40mg daily.  Secondary Diagnosis:	Type 2 diabetes mellitus with diabetic polyneuropathy, with long-term current use of insulin  Goal:	stable  Assessment and plan of treatment:	You should continue Humalog 30 units before meals, Lantus 80 units at bedtime and metformin 1000mg twice daily. Principal Discharge DX:	Hepatic encephalopathy  Goal:	Resolution.  Assessment and plan of treatment:	cont lactulose, rifaximin; call dr montalvo office if develop diarrhea and consider decreasing lactulose dose  follow up with GI within 2-3 days of discharge  Secondary Diagnosis:	Elevated liver enzymes  Goal:	resolution  Assessment and plan of treatment:	-follow up with GI/Hepatologist (specialist of the liver) Dr. Montalvo  Secondary Diagnosis:	Hypertension  Goal:	stable  Assessment and plan of treatment:	You should continue Irbesartan 300mg daily.  Secondary Diagnosis:	Hypercholesteremia  Goal:	stable  Assessment and plan of treatment:	You should continue Pravastatin 40mg daily.  Secondary Diagnosis:	Type 2 diabetes mellitus with diabetic polyneuropathy, with long-term current use of insulin  Goal:	stable  Assessment and plan of treatment:	You should continue Humalog 30 units before meals, Lantus 80 units at bedtime and metformin 1000mg twice daily.  follow up with your primary care doctor

## 2018-02-06 VITALS
SYSTOLIC BLOOD PRESSURE: 116 MMHG | DIASTOLIC BLOOD PRESSURE: 67 MMHG | RESPIRATION RATE: 18 BRPM | OXYGEN SATURATION: 99 % | HEART RATE: 87 BPM | TEMPERATURE: 98 F

## 2018-02-06 LAB
ALBUMIN SERPL ELPH-MCNC: 2.9 G/DL — LOW (ref 3.3–5)
ALP SERPL-CCNC: 136 U/L — HIGH (ref 40–120)
ALT FLD-CCNC: 33 U/L — SIGNIFICANT CHANGE UP (ref 12–78)
AMMONIA BLD-MCNC: 84 UMOL/L — HIGH (ref 11–32)
ANA TITR SER: NEGATIVE — SIGNIFICANT CHANGE UP
ANION GAP SERPL CALC-SCNC: 8 MMOL/L — SIGNIFICANT CHANGE UP (ref 5–17)
APTT BLD: 37.3 SEC — SIGNIFICANT CHANGE UP (ref 27.5–37.4)
AST SERPL-CCNC: 49 U/L — HIGH (ref 15–37)
AUTO DIFF PNL BLD: NEGATIVE — SIGNIFICANT CHANGE UP
BAKER'S YEAST IGA QN IA: 38.2 UNITS — HIGH
BAKER'S YEAST IGA QN IA: POSITIVE
BAKER'S YEAST IGG QN IA: 58 UNITS — HIGH
BAKER'S YEAST IGG QN IA: POSITIVE
BILIRUB SERPL-MCNC: 2.7 MG/DL — HIGH (ref 0.2–1.2)
BUN SERPL-MCNC: 13 MG/DL — SIGNIFICANT CHANGE UP (ref 7–23)
C-ANCA SER-ACNC: NEGATIVE — SIGNIFICANT CHANGE UP
CALCIUM SERPL-MCNC: 8.9 MG/DL — SIGNIFICANT CHANGE UP (ref 8.5–10.1)
CHLORIDE SERPL-SCNC: 107 MMOL/L — SIGNIFICANT CHANGE UP (ref 96–108)
CO2 SERPL-SCNC: 26 MMOL/L — SIGNIFICANT CHANGE UP (ref 22–31)
CREAT SERPL-MCNC: 0.82 MG/DL — SIGNIFICANT CHANGE UP (ref 0.5–1.3)
CULTURE RESULTS: SIGNIFICANT CHANGE UP
DSDNA AB SER-ACNC: <12 IU/ML — SIGNIFICANT CHANGE UP
FOLATE SERPL-MCNC: 16.7 NG/ML — SIGNIFICANT CHANGE UP (ref 4.8–24.2)
GLUCOSE SERPL-MCNC: 114 MG/DL — HIGH (ref 70–99)
HCT VFR BLD CALC: 36.9 % — LOW (ref 39–50)
HGB BLD-MCNC: 12.4 G/DL — LOW (ref 13–17)
INR BLD: 1.3 RATIO — HIGH (ref 0.88–1.16)
MCHC RBC-ENTMCNC: 33.1 PG — SIGNIFICANT CHANGE UP (ref 27–34)
MCHC RBC-ENTMCNC: 33.7 GM/DL — SIGNIFICANT CHANGE UP (ref 32–36)
MCV RBC AUTO: 98.3 FL — SIGNIFICANT CHANGE UP (ref 80–100)
P-ANCA SER-ACNC: NEGATIVE — SIGNIFICANT CHANGE UP
PCP SPEC-MCNC: SIGNIFICANT CHANGE UP
PLATELET # BLD AUTO: 124 K/UL — LOW (ref 150–400)
POTASSIUM SERPL-MCNC: 3.8 MMOL/L — SIGNIFICANT CHANGE UP (ref 3.5–5.3)
POTASSIUM SERPL-SCNC: 3.8 MMOL/L — SIGNIFICANT CHANGE UP (ref 3.5–5.3)
PROT SERPL-MCNC: 6.8 G/DL — SIGNIFICANT CHANGE UP (ref 6–8.3)
PROTHROM AB SERPL-ACNC: 14.2 SEC — HIGH (ref 9.8–12.7)
RBC # BLD: 3.76 M/UL — LOW (ref 4.2–5.8)
RBC # FLD: 13.1 % — SIGNIFICANT CHANGE UP (ref 10.3–14.5)
SODIUM SERPL-SCNC: 141 MMOL/L — SIGNIFICANT CHANGE UP (ref 135–145)
SPECIMEN SOURCE: SIGNIFICANT CHANGE UP
TSH SERPL-MCNC: 1.23 UIU/ML — SIGNIFICANT CHANGE UP (ref 0.36–3.74)
VIT B12 SERPL-MCNC: 772 PG/ML — SIGNIFICANT CHANGE UP (ref 232–1245)
WBC # BLD: 4.6 K/UL — SIGNIFICANT CHANGE UP (ref 3.8–10.5)
WBC # FLD AUTO: 4.6 K/UL — SIGNIFICANT CHANGE UP (ref 3.8–10.5)

## 2018-02-06 PROCEDURE — 70450 CT HEAD/BRAIN W/O DYE: CPT

## 2018-02-06 PROCEDURE — 82390 ASSAY OF CERULOPLASMIN: CPT

## 2018-02-06 PROCEDURE — 80074 ACUTE HEPATITIS PANEL: CPT

## 2018-02-06 PROCEDURE — 86038 ANTINUCLEAR ANTIBODIES: CPT

## 2018-02-06 PROCEDURE — 82787 IGG 1 2 3 OR 4 EACH: CPT

## 2018-02-06 PROCEDURE — 86225 DNA ANTIBODY NATIVE: CPT

## 2018-02-06 PROCEDURE — 87040 BLOOD CULTURE FOR BACTERIA: CPT

## 2018-02-06 PROCEDURE — 85730 THROMBOPLASTIN TIME PARTIAL: CPT

## 2018-02-06 PROCEDURE — 87086 URINE CULTURE/COLONY COUNT: CPT

## 2018-02-06 PROCEDURE — 93970 EXTREMITY STUDY: CPT

## 2018-02-06 PROCEDURE — 84484 ASSAY OF TROPONIN QUANT: CPT

## 2018-02-06 PROCEDURE — 99285 EMERGENCY DEPT VISIT HI MDM: CPT | Mod: 25

## 2018-02-06 PROCEDURE — 76700 US EXAM ABDOM COMPLETE: CPT

## 2018-02-06 PROCEDURE — 83605 ASSAY OF LACTIC ACID: CPT

## 2018-02-06 PROCEDURE — 82962 GLUCOSE BLOOD TEST: CPT

## 2018-02-06 PROCEDURE — 86036 ANCA SCREEN EACH ANTIBODY: CPT

## 2018-02-06 PROCEDURE — 87486 CHLMYD PNEUM DNA AMP PROBE: CPT

## 2018-02-06 PROCEDURE — 80053 COMPREHEN METABOLIC PANEL: CPT

## 2018-02-06 PROCEDURE — 93005 ELECTROCARDIOGRAM TRACING: CPT

## 2018-02-06 PROCEDURE — 80061 LIPID PANEL: CPT

## 2018-02-06 PROCEDURE — 86381 MITOCHONDRIAL ANTIBODY EACH: CPT

## 2018-02-06 PROCEDURE — 81001 URINALYSIS AUTO W/SCOPE: CPT

## 2018-02-06 PROCEDURE — 87798 DETECT AGENT NOS DNA AMP: CPT

## 2018-02-06 PROCEDURE — 83520 IMMUNOASSAY QUANT NOS NONAB: CPT

## 2018-02-06 PROCEDURE — 85027 COMPLETE CBC AUTOMATED: CPT

## 2018-02-06 PROCEDURE — 71045 X-RAY EXAM CHEST 1 VIEW: CPT

## 2018-02-06 PROCEDURE — 96372 THER/PROPH/DIAG INJ SC/IM: CPT

## 2018-02-06 PROCEDURE — 85610 PROTHROMBIN TIME: CPT

## 2018-02-06 PROCEDURE — 83880 ASSAY OF NATRIURETIC PEPTIDE: CPT

## 2018-02-06 PROCEDURE — 87581 M.PNEUMON DNA AMP PROBE: CPT

## 2018-02-06 PROCEDURE — 87633 RESP VIRUS 12-25 TARGETS: CPT

## 2018-02-06 PROCEDURE — 82140 ASSAY OF AMMONIA: CPT

## 2018-02-06 PROCEDURE — 82009 KETONE BODYS QUAL: CPT

## 2018-02-06 PROCEDURE — 99239 HOSP IP/OBS DSCHRG MGMT >30: CPT

## 2018-02-06 RX ORDER — LACTULOSE 10 G/15ML
30 SOLUTION ORAL
Qty: 450 | Refills: 0 | OUTPATIENT
Start: 2018-02-06 | End: 2018-02-10

## 2018-02-06 RX ORDER — ALPRAZOLAM 0.25 MG
2 TABLET ORAL ONCE
Qty: 0 | Refills: 0 | Status: DISCONTINUED | OUTPATIENT
Start: 2018-02-06 | End: 2018-02-06

## 2018-02-06 RX ADMIN — GABAPENTIN 800 MILLIGRAM(S): 400 CAPSULE ORAL at 00:02

## 2018-02-06 RX ADMIN — GABAPENTIN 800 MILLIGRAM(S): 400 CAPSULE ORAL at 11:18

## 2018-02-06 RX ADMIN — GABAPENTIN 800 MILLIGRAM(S): 400 CAPSULE ORAL at 17:44

## 2018-02-06 RX ADMIN — Medication 81 MILLIGRAM(S): at 11:19

## 2018-02-06 RX ADMIN — LOSARTAN POTASSIUM 100 MILLIGRAM(S): 100 TABLET, FILM COATED ORAL at 06:18

## 2018-02-06 RX ADMIN — LACTULOSE 20 GRAM(S): 10 SOLUTION ORAL at 06:18

## 2018-02-06 RX ADMIN — Medication 2: at 17:44

## 2018-02-06 RX ADMIN — GABAPENTIN 800 MILLIGRAM(S): 400 CAPSULE ORAL at 06:18

## 2018-02-06 RX ADMIN — Medication 2: at 12:50

## 2018-02-06 RX ADMIN — LACTULOSE 20 GRAM(S): 10 SOLUTION ORAL at 13:18

## 2018-02-06 NOTE — PROGRESS NOTE ADULT - PROBLEM SELECTOR PLAN 5
stable, cont home pravastatin ( may use home meds)  -lipid panel wnl
stable, cont home pravastatin ( may use home meds)  -f/u lipid panel

## 2018-02-06 NOTE — PROGRESS NOTE ADULT - SUBJECTIVE AND OBJECTIVE BOX
Neurology follow up note    JESSICA [id number]      Interval History:    Patient feels ok no new complaints.    MEDICATIONS    ALPRAZolam 2 milliGRAM(s) Oral once PRN  aspirin enteric coated 81 milliGRAM(s) Oral daily  dextrose 5%. 1000 milliLiter(s) IV Continuous <Continuous>  dextrose 50% Injectable 12.5 Gram(s) IV Push once  dextrose Gel 1 Dose(s) Oral once PRN  gabapentin 800 milliGRAM(s) Oral four times a day  glucagon  Injectable 1 milliGRAM(s) IntraMuscular once PRN  insulin glargine Injectable (LANTUS) 80 Unit(s) SubCutaneous at bedtime  insulin lispro (HumaLOG) corrective regimen sliding scale   SubCutaneous three times a day before meals  lactulose Syrup 20 Gram(s) Oral three times a day  losartan 100 milliGRAM(s) Oral daily  rifaximin 550 milliGRAM(s) Oral two times a day      Allergies    codeine (Anaphylaxis)    Intolerances            Vital Signs Last 24 Hrs  T(C): 36.6 (2018 14:27), Max: 36.9 (2018 20:07)  T(F): 97.9 (2018 14:27), Max: 98.4 (2018 20:07)  HR: 87 (2018 14:27) (81 - 95)  BP: 116/67 (2018 14:27) (116/67 - 137/76)  BP(mean): --  RR: 18 (2018 14:27) (18 - 18)  SpO2: 99% (2018 14:27) (95% - 99%)      REVIEW OF SYSTEMS:     Constitutional: No fever, chills, fatigue, weakness  Eyes: no eye pain, visual disturbances, or discharge  ENT:  No difficulty hearing, tinnitus, vertigo; No sinus or throat pain  Neck: No pain or stiffness  Respiratory: No cough, dyspnea, wheezing   Cardiovascular: No chest pain, palpitations,   Gastrointestinal: No abdominal or epigastric pain. No nausea, vomiting  No diarrhea or constipation.   Genitourinary: No dysuria, frequency, hematuria or incontinence  Neurological: No headaches, lightheadedness, vertigo, numbness or tremors  Psychiatric: No depression, anxiety, mood swings or difficulty sleeping  Musculoskeletal: No joint pain or swelling; No muscle, back or extremity pain  Skin: No itching, burning, rashes or lesions   Lymph Nodes: No enlarged glands  Endocrine: No heat or cold intolerance; No hair loss   Allergy and Immunologic: No hives or eczema    On Neurological Examination: The patient awake, alert.  Location was [place],      The patient was able to say how many nickels ,      Extraocular movements were intact.      Pupils equal, round, reactive bilaterally 3 mm to 2.      Full visual fields.      Speech was fluent.     Motor:  Bilateral upper and lower 4+/5.      Sensory:  Bilateral upper and lower intact to light touch.      Follow simple commands  Follow complex commands      GENERAL Exam: Nontoxic , No Acute Distress   	  HEENT:  normocephalic, atraumatic  		  LUNGS: Clear bilaterally    	  HEART: Normal S1S2   No murmur RRR        	  GI/ ABDOMEN:  Soft  Non tender    EXTREMITIES:   No Edema  No Clubbing  No Cyanosis No Edema    MUSCULOSKELETAL: Normal Range of Motion   	   SKIN: Normal  No Ecchymosis               LABS:  CBC Full  -  ( 2018 07:10 )  WBC Count : 4.6 K/uL  Hemoglobin : 12.4 g/dL  Hematocrit : 36.9 %  Platelet Count - Automated : 124 K/uL  Mean Cell Volume : 98.3 fl  Mean Cell Hemoglobin : 33.1 pg  Mean Cell Hemoglobin Concentration : 33.7 gm/dL  Auto Neutrophil # : x  Auto Lymphocyte # : x  Auto Monocyte # : x  Auto Eosinophil # : x  Auto Basophil # : x  Auto Neutrophil % : x  Auto Lymphocyte % : x  Auto Monocyte % : x  Auto Eosinophil % : x  Auto Basophil % : x    Urinalysis Basic - ( 2018 23:32 )    Color: Yellow / Appearance: Slightly Turbid / S.020 / pH: x  Gluc: x / Ketone: Negative  / Bili: Small / Urobili: Negative   Blood: x / Protein: Negative / Nitrite: Negative   Leuk Esterase: Negative / RBC: 0-2 /HPF / WBC Negative   Sq Epi: x / Non Sq Epi: Negative / Bacteria: Negative          141  |  107  |  13  ----------------------------<  114<H>  3.8   |  26  |  0.82    Ca    8.9      2018 07:10    TPro  6.8  /  Alb  2.9<L>  /  TBili  2.7<H>  /  DBili  x   /  AST  49<H>  /  ALT  33  /  AlkPhos  136<H>      Hemoglobin A1C:     LIVER FUNCTIONS - ( 2018 07:10 )  Alb: 2.9 g/dL / Pro: 6.8 g/dL / ALK PHOS: 136 U/L / ALT: 33 U/L / AST: 49 U/L / GGT: x           Vitamin B12 Vitamin B12, Serum: 772 pg/mL ( @ 08:28)    PT/INR - ( 2018 07:10 )   PT: 14.2 sec;   INR: 1.30 ratio         PTT - ( 2018 07:10 )  PTT:37.3 sec      RADIOLOGY    ANALYSIS AND PLAN:  This is a 62-year-old with episode of change in mental status.  1.	For episode of change in mental status, suspect most likely metabolic encephalopathy possibly secondary to elevated ammonia level of unclear etiology.  Overall, the patient's mental status appears to be improving.  2.	We will recommend to rule out other causes of metabolic encephalopathy, would check TSH, vitamin B12 and folate.  3.	We will recommend GI workup as needed for elevated ammonia levels.  4.	Recommend to continue the patient on Xifaxan.  5.	For history of neuropathy, we will recommend to restart the patient on gabapentin.  6.	Spoke with spouse at bedside, Ada 18.  Her telephone number was [phone].  7.	  Thank you for courtesy of consultation.    Physical therapy evaluation as tolerated  OOB to chair/ambulation with assistance only if possible.    Greater than 45 minutes spent in direct patient care reviewing  the notes, lab data/ imaging , discussion with multidisciplinary team.
Patient is a 62y old  Male who presents with a chief complaint of cellulitis (2018 06:38)    INTERVAL HPI/OVERNIGHT EVENTS: Patient seen and examined at bedside. Patient is AOx3. States that he had 1 large bm yesterday. Denies headache, fevers, chills, chest pain, sob, abdominal pain, muscle weakness.     REVIEW OF SYSTEMS: negative. As per HPI    Vital Signs Last 24 Hrs  T(C): 36.7 (2018 04:24), Max: 36.9 (2018 20:07)  T(F): 98.1 (2018 04:24), Max: 98.4 (2018 20:07)  HR: 81 (2018 04:24) (81 - 95)  BP: 122/76 (2018 04:24) (122/76 - 137/76)  RR: 18 (2018 04:24) (18 - 18)  SpO2: 95% (2018 04:24) (95% - 95%)    PHYSICAL EXAM:  GENERAL: No acute distress, well-groomed, well-developed  HEAD:  Atraumatic, Normocephalic  EYES: EOMI, PERRL, conjunctiva and sclera clear  ENMT: No tonsillar erythema, exudates, or enlargement; Moist mucous membranes, Good dentition, No lesions  NERVOUS SYSTEM:  Alert & Oriented X3, Motor Strength 5/5 B/L upper and lower extremities  CHEST/LUNG: Clear to auscultation bilaterally; No rales, rhonchi, wheezing, or rubs  HEART: Regular rate and rhythm; No murmurs, rubs, or gallops  ABDOMEN: +large abdominal hernia palpated, reducible, Soft, Nontender, Nondistended; Bowel sounds present, no guarding, no rebound  EXTREMITIES:  2+ Peripheral Pulses, No clubbing, cyanosis, or edema  LYMPH: No lymphadenopathy noted  SKIN: no rash, no erythema    LABS:                         12.4   4.6   )-----------( 124      ( 2018 07:10 )             36.9     2018 07:10    141    |  107    |  13     ----------------------------<  114    3.8     |  26     |  0.82     Ca    8.9        2018 07:10    TPro  6.8    /  Alb  2.9    /  TBili  2.7    /  DBili  x      /  AST  49     /  ALT  33     /  AlkPhos  136    2018 07:10    LIVER FUNCTIONS - ( 2018 07:10 )  Alb: 2.9 g/dL / Pro: 6.8 g/dL / ALK PHOS: 136 U/L / ALT: 33 U/L / AST: 49 U/L / GGT: x           PT/INR - ( 2018 07:10 )   PT: 14.2 sec;   INR: 1.30 ratio    PTT - ( 2018 07:10 )  PTT:37.3 sec  CAPILLARY BLOOD GLUCOSE  POCT Blood Glucose.: 127 mg/dL (2018 07:36)  POCT Blood Glucose.: 169 mg/dL (2018 20:57)  POCT Blood Glucose.: 197 mg/dL (2018 17:04)  POCT Blood Glucose.: 190 mg/dL (2018 13:22)  POCT Blood Glucose.: 179 mg/dL (2018 11:50)    CARDIAC MARKERS ( 2018 21:33 )  <.015 ng/mL / x     / x     / x     / x        Urinalysis Basic - ( 2018 23:32 )  Color: Yellow / Appearance: Slightly Turbid / S.020 / pH: x  Gluc: x / Ketone: Negative  / Bili: Small / Urobili: Negative   Blood: x / Protein: Negative / Nitrite: Negative   Leuk Esterase: Negative / RBC: 0-2 /HPF / WBC Negative   Sq Epi: x / Non Sq Epi: Negative / Bacteria: Negative    RADIOLOGY & ADDITIONAL TESTS:  CT Head No Cont (18 @ 01:13) >  No hemorrhage or mass effect.     If there are new or persistent symptoms, consider further evaluation with brain MRI if clinically warranted and if there are no contraindications.    < from: Xray Chest 1 View AP/PA (18 @ 22:58) >  Clear lungs, unchanged.     < from: US Abdomen Complete (18 @ 08:35) >  Dilated common bile duct which may be on the basis of a postcholecystectomy status. Correlate with LFTs. Coarsened echotexture of the liver.    MEDICATIONS  (STANDING):  aspirin enteric coated 81 milliGRAM(s) Oral daily  dextrose 5%. 1000 milliLiter(s) (50 mL/Hr) IV Continuous <Continuous>  dextrose 50% Injectable 12.5 Gram(s) IV Push once  gabapentin 800 milliGRAM(s) Oral four times a day  insulin glargine Injectable (LANTUS) 80 Unit(s) SubCutaneous at bedtime  insulin lispro (HumaLOG) corrective regimen sliding scale   SubCutaneous three times a day before meals  lactulose Syrup 20 Gram(s) Oral three times a day  losartan 100 milliGRAM(s) Oral daily  rifaximin 550 milliGRAM(s) Oral two times a day    MEDICATIONS  (PRN):  dextrose Gel 1 Dose(s) Oral once PRN Blood Glucose LESS THAN 70 milliGRAM(s)/deciliter  glucagon  Injectable 1 milliGRAM(s) IntraMuscular once PRN Glucose LESS THAN 70 milligrams/deciliter
Patient is a 62y old  Male who presents with a chief complaint of cellulitis (05 Feb 2018 06:38)    INTERVAL HPI/OVERNIGHT EVENTS: Patient seen and examined at bedside. Patient is alert and oriented to person and place. He is unsure of the year. States that it is 2020 and when asked again patient thought it was 1997. Denies headache, fevers, chills, chest pain, sob, abdominal pain, muscle weakness. Patient recently discharged from [place] for cellulitis. Completed PO antibiotics.     MEDICATIONS  (STANDING):  aspirin enteric coated 81 milliGRAM(s) Oral daily  dextrose 5%. 1000 milliLiter(s) (50 mL/Hr) IV Continuous <Continuous>  dextrose 50% Injectable 12.5 Gram(s) IV Push once  gabapentin 800 milliGRAM(s) Oral four times a day  insulin glargine Injectable (LANTUS) 80 Unit(s) SubCutaneous at bedtime  insulin lispro (HumaLOG) corrective regimen sliding scale   SubCutaneous three times a day before meals  lactulose Syrup 20 Gram(s) Oral three times a day    MEDICATIONS  (PRN):  dextrose Gel 1 Dose(s) Oral once PRN Blood Glucose LESS THAN 70 milliGRAM(s)/deciliter  glucagon  Injectable 1 milliGRAM(s) IntraMuscular once PRN Glucose LESS THAN 70 milligrams/deciliter    REVIEW OF SYSTEMS: negative. As per HPI    Vital Signs Last 24 Hrs  T(C): 37 (05 Feb 2018 06:47), Max: 37.4 (04 Feb 2018 23:32)  T(F): 98.6 (05 Feb 2018 06:47), Max: 99.3 (04 Feb 2018 23:32)  HR: 95 (05 Feb 2018 06:47) (94 - 101)  BP: 164/86 (05 Feb 2018 06:47) (139/66 - 164/86)  RR: 18 (05 Feb 2018 06:47) (14 - 18)  SpO2: 96% (05 Feb 2018 06:47) (95% - 96%)    PHYSICAL EXAM:  GENERAL: No acute distress, well-groomed, well-developed  HEAD:  Atraumatic, Normocephalic  EYES: EOMI, PERRL, conjunctiva and sclera clear  ENMT: No tonsillar erythema, exudates, or enlargement; Moist mucous membranes, Good dentition, No lesions  NERVOUS SYSTEM:  Alert & Oriented X2 (to person and place), Motor Strength 5/5 B/L upper and lower extremities  CHEST/LUNG: Clear to auscultation bilaterally; No rales, rhonchi, wheezing, or rubs  HEART: Regular rate and rhythm; No murmurs, rubs, or gallops  ABDOMEN: +large abdominal hernia palpated, reducible, Soft, Nontender, Nondistended; Bowel sounds present, no guarding, no rebound  EXTREMITIES:  2+ Peripheral Pulses, No clubbing, cyanosis, or edema  LYMPH: No lymphadenopathy noted  SKIN: +improved erythema of LLE    LABS:                        11.9   4.6   )-----------( 117      ( 05 Feb 2018 09:59 )             35.7     04 Feb 2018 21:33    139    |  107    |  16     ----------------------------<  242    4.1     |  23     |  0.91     Ca    8.7        04 Feb 2018 21:33    TPro  7.1    /  Alb  3.0    /  TBili  2.2    /  DBili  x      /  AST  40     /  ALT  29     /  AlkPhos  241    04 Feb 2018 21:33    PT/INR - ( 05 Feb 2018 04:45 )   PT: 14.6 sec;   INR: 1.33 ratio    PTT - ( 04 Feb 2018 21:33 )  PTT:37.5 sec  CAPILLARY BLOOD GLUCOSE    POCT Blood Glucose.: 212 mg/dL (05 Feb 2018 07:45)  POCT Blood Glucose.: 239 mg/dL (04 Feb 2018 20:48)    RADIOLOGY & ADDITIONAL TESTS:  CT Head No Cont (02.05.18 @ 01:13) >  No hemorrhage or mass effect.     If there are new or persistent symptoms, consider further evaluation with brain MRI if clinically warranted and if there are no contraindications.    < from: Xray Chest 1 View AP/PA (02.04.18 @ 22:58) >  Clear lungs, unchanged.     < from: US Abdomen Complete (02.05.18 @ 08:35) >  Dilated common bile duct which may be on the basis of a postcholecystectomy status. Correlate with LFTs. Coarsened echotexture of the liver.

## 2018-02-06 NOTE — PROGRESS NOTE ADULT - PROBLEM SELECTOR PLAN 4
Cont Lantus 80units at HS  Cont NISS (hold oral home meds)  accuchecks  HgA1c 10.2 (1/24)
Cont Lantus 80units at HS  Cont NISS (hold oral home meds)  accuchecks  HgA1c 10.2 (1/24)

## 2018-02-06 NOTE — PROGRESS NOTE ADULT - PROBLEM SELECTOR PLAN 6
IMPROVE VTE Individual Risk Assessment        RISK                                                          Points  [  ] Previous VTE                                                3  [  ] Thrombophilia                                             2  [  ] Lower limb paralysis                                    2        (unable to hold up >15 seconds)    [  ] Current Cancer                                             2         (within 6 months)    [  ] Immobilization > 24 hrs                              1  [  ] ICU/CCU stay > 24 hours                            1    [  x] Age > 60                                                    1  IMPROVE VTE Score _1. SCD________
IMPROVE VTE Individual Risk Assessment        RISK                                                          Points  [  ] Previous VTE                                                3  [  ] Thrombophilia                                             2  [  ] Lower limb paralysis                                    2        (unable to hold up >15 seconds)    [  ] Current Cancer                                             2         (within 6 months)    [  ] Immobilization > 24 hrs                              1  [  ] ICU/CCU stay > 24 hours                            1    [  x] Age > 60                                                    1  IMPROVE VTE Score _1. SCD________

## 2018-02-06 NOTE — PROGRESS NOTE ADULT - PROBLEM SELECTOR PLAN 3
stable  Cont Losartan (substituted for home irbesartan)
stable  Cont Losartan (substituted for home irbesartan)

## 2018-02-06 NOTE — PROGRESS NOTE ADULT - ASSESSMENT
63 YO M PMH DM2 on insulin, elevated liver transaminases, admitted with altered mental status likely 2/2 hepatic encephalopathy.

## 2018-02-06 NOTE — PROGRESS NOTE ADULT - PROBLEM SELECTOR PLAN 1
Improved. Serum ammonia 84 today  - Continue lactulose 20mg TID and rifaximin 550mg BID.   - hepatitis panel nonreactive  -f/u utox  -f/u Benny, AMA, IgG subgroups, ASCA, ANCA  - lipid panel wnl  -serum cerruloplasmin- wnl  -dsDNA WNL  -GI- Sideridis   - Neuro- Dr. Peña consulted. Continue with current plan.   - Psych-[place] consulted. Mental status improved.   -Pt unable to tolerate MRI due to claustrophobia. MRCP ordered as per GI.

## 2018-02-06 NOTE — PROGRESS NOTE ADULT - PROBLEM SELECTOR PLAN 2
Improving. Continue to monitor  - Avoid hepatotoxic medications  - follow up GI recs
Improving. Continue to monitor  - Avoid hepatotoxic medications  - Follow up MRCP

## 2018-02-07 LAB
IGG SERPL-MCNC: 1247 MG/DL — SIGNIFICANT CHANGE UP (ref 700–1600)
IGG1 SER-MCNC: 875 MG/DL — HIGH (ref 248–810)
IGG2 SER-MCNC: 297 MG/DL — SIGNIFICANT CHANGE UP (ref 130–555)
IGG3 SER-MCNC: 78 MG/DL — SIGNIFICANT CHANGE UP (ref 15–102)
IGG4 SER-MCNC: 48 MG/DL — SIGNIFICANT CHANGE UP (ref 2–96)
MITOCHONDRIA AB SER-ACNC: SIGNIFICANT CHANGE UP

## 2018-02-08 NOTE — CONSULT NOTE ADULT - CONSULT REQUESTED BY NAME
Patient comes in today for a sore throat and edema in her legs.  History   Smoking Status   • Never Smoker   Smokeless Tobacco   • Never Used     Health Maintenance Summary     Topic Due On Due Status Completed On Postpone Until Reason    Osteoporosis Screening  Completed May 11, 2009      Immunization - Pneumococcal  Completed Aug 2, 2016      Diabetes Eye Exam Nov 1, 2018 Not Due Nov 1, 2017      Glycohemoglobin A1C  (Diabetes Sugar)  Jun 8, 2018 Not Due Dec 8, 2017      GFR  (Kidney Function Test)  Dec 8, 2018 Not Due Dec 8, 2017      Diabetes Foot Exam  Sep 26, 2018 Not Due Sep 26, 2017      Medicare Wellness Visit Jun 27, 2018 Due Soon Jun 27, 2017      IMMUNIZATION - DTaP/Tdap/Td Jul 14, 1958 Postponed  Apr 27, 2018 Insurance or Financial    Immunization-Influenza  Completed Sep 26, 2017      Depression Screening Jun 27, 2018 Not Due Jun 27, 2017            Patient is up to date, no discussion needed .               Vini Hale

## 2018-04-12 NOTE — ED ADULT NURSE NOTE - ATTEMPT TO OOB
----- Message from Tor Garg MD sent at 4/11/2018  7:29 PM CDT -----  Her chemistry panel and A1c indicate normal kidney function and good blood sugar control    The cholesterol levels look very good and are much improved but the LDL is not quite to the goal of less than 70.  I would recommend increasing the Crestor to 40 mg.    Results sent by email   no

## 2018-05-04 ENCOUNTER — OUTPATIENT (OUTPATIENT)
Dept: OUTPATIENT SERVICES | Facility: HOSPITAL | Age: 63
LOS: 1 days | End: 2018-05-04
Payer: MEDICARE

## 2018-05-04 DIAGNOSIS — R10.11 RIGHT UPPER QUADRANT PAIN: ICD-10-CM

## 2018-05-04 DIAGNOSIS — Z90.49 ACQUIRED ABSENCE OF OTHER SPECIFIED PARTS OF DIGESTIVE TRACT: Chronic | ICD-10-CM

## 2018-05-04 DIAGNOSIS — E11.65 TYPE 2 DIABETES MELLITUS WITH HYPERGLYCEMIA: ICD-10-CM

## 2018-05-04 DIAGNOSIS — E11.311 TYPE 2 DIABETES MELLITUS WITH UNSPECIFIED DIABETIC RETINOPATHY WITH MACULAR EDEMA: ICD-10-CM

## 2018-05-04 LAB
ALBUMIN SERPL ELPH-MCNC: 2.9 G/DL — LOW (ref 3.3–5)
ALP SERPL-CCNC: 206 U/L — HIGH (ref 30–120)
ALT FLD-CCNC: 32 U/L DA — SIGNIFICANT CHANGE UP (ref 10–60)
AMMONIA BLD-MCNC: 73 UMOL/L — HIGH (ref 11–32)
AMYLASE P1 CFR SERPL: 34 U/L — SIGNIFICANT CHANGE UP (ref 25–125)
ANION GAP SERPL CALC-SCNC: 6 MMOL/L — SIGNIFICANT CHANGE UP (ref 5–17)
AST SERPL-CCNC: 34 U/L — SIGNIFICANT CHANGE UP (ref 10–40)
BILIRUB SERPL-MCNC: 2.7 MG/DL — HIGH (ref 0.2–1.2)
BUN SERPL-MCNC: 17 MG/DL — SIGNIFICANT CHANGE UP (ref 7–23)
CALCIUM SERPL-MCNC: 8.7 MG/DL — SIGNIFICANT CHANGE UP (ref 8.4–10.5)
CHLORIDE SERPL-SCNC: 102 MMOL/L — SIGNIFICANT CHANGE UP (ref 96–108)
CO2 SERPL-SCNC: 26 MMOL/L — SIGNIFICANT CHANGE UP (ref 22–31)
CREAT ?TM UR-MCNC: 28 MG/DL — SIGNIFICANT CHANGE UP
CREAT SERPL-MCNC: 1.02 MG/DL — SIGNIFICANT CHANGE UP (ref 0.5–1.3)
GGT SERPL-CCNC: 40 U/L — SIGNIFICANT CHANGE UP (ref 9–50)
GLUCOSE SERPL-MCNC: 216 MG/DL — HIGH (ref 70–99)
HBA1C BLD-MCNC: 8.6 % — HIGH (ref 4–5.6)
HCT VFR BLD CALC: 31.9 % — LOW (ref 39–50)
HGB BLD-MCNC: 11.3 G/DL — LOW (ref 13–17)
LIDOCAIN IGE QN: 118 U/L — SIGNIFICANT CHANGE UP (ref 73–393)
MCHC RBC-ENTMCNC: 33.2 PG — SIGNIFICANT CHANGE UP (ref 27–34)
MCHC RBC-ENTMCNC: 35.5 GM/DL — SIGNIFICANT CHANGE UP (ref 32–36)
MCV RBC AUTO: 93.7 FL — SIGNIFICANT CHANGE UP (ref 80–100)
MICROALBUMIN UR-MCNC: 1.1 MG/DL — SIGNIFICANT CHANGE UP
MICROALBUMIN/CREAT UR-RTO: 39 MG/G — HIGH (ref 0–30)
PLATELET # BLD AUTO: 107 K/UL — LOW (ref 150–400)
POTASSIUM SERPL-MCNC: 4.3 MMOL/L — SIGNIFICANT CHANGE UP (ref 3.5–5.3)
POTASSIUM SERPL-SCNC: 4.3 MMOL/L — SIGNIFICANT CHANGE UP (ref 3.5–5.3)
PROT SERPL-MCNC: 7 G/DL — SIGNIFICANT CHANGE UP (ref 6–8.3)
RBC # BLD: 3.41 M/UL — LOW (ref 4.2–5.8)
RBC # FLD: 13.6 % — SIGNIFICANT CHANGE UP (ref 10.3–14.5)
SODIUM SERPL-SCNC: 134 MMOL/L — LOW (ref 135–145)
WBC # BLD: 3.6 K/UL — LOW (ref 3.8–10.5)
WBC # FLD AUTO: 3.6 K/UL — LOW (ref 3.8–10.5)

## 2018-05-04 PROCEDURE — 82140 ASSAY OF AMMONIA: CPT

## 2018-05-04 PROCEDURE — 82150 ASSAY OF AMYLASE: CPT

## 2018-05-04 PROCEDURE — 82977 ASSAY OF GGT: CPT

## 2018-05-04 PROCEDURE — 36415 COLL VENOUS BLD VENIPUNCTURE: CPT

## 2018-05-04 PROCEDURE — 82043 UR ALBUMIN QUANTITATIVE: CPT

## 2018-05-04 PROCEDURE — 83036 HEMOGLOBIN GLYCOSYLATED A1C: CPT

## 2018-05-04 PROCEDURE — 85027 COMPLETE CBC AUTOMATED: CPT

## 2018-05-04 PROCEDURE — 80053 COMPREHEN METABOLIC PANEL: CPT

## 2018-05-04 PROCEDURE — 83690 ASSAY OF LIPASE: CPT

## 2018-07-10 ENCOUNTER — INPATIENT (INPATIENT)
Facility: HOSPITAL | Age: 63
LOS: 1 days | Discharge: HOME | DRG: 442 | End: 2018-07-12
Attending: INTERNAL MEDICINE | Admitting: INTERNAL MEDICINE
Payer: MEDICARE

## 2018-07-10 VITALS
OXYGEN SATURATION: 95 % | RESPIRATION RATE: 18 BRPM | TEMPERATURE: 98 F | DIASTOLIC BLOOD PRESSURE: 72 MMHG | WEIGHT: 300.05 LBS | HEIGHT: 72 IN | SYSTOLIC BLOOD PRESSURE: 155 MMHG | HEART RATE: 96 BPM

## 2018-07-10 DIAGNOSIS — E66.9 OBESITY, UNSPECIFIED: ICD-10-CM

## 2018-07-10 DIAGNOSIS — K76.0 FATTY (CHANGE OF) LIVER, NOT ELSEWHERE CLASSIFIED: ICD-10-CM

## 2018-07-10 DIAGNOSIS — R41.82 ALTERED MENTAL STATUS, UNSPECIFIED: ICD-10-CM

## 2018-07-10 DIAGNOSIS — E78.00 PURE HYPERCHOLESTEROLEMIA, UNSPECIFIED: ICD-10-CM

## 2018-07-10 DIAGNOSIS — E11.9 TYPE 2 DIABETES MELLITUS WITHOUT COMPLICATIONS: ICD-10-CM

## 2018-07-10 DIAGNOSIS — I10 ESSENTIAL (PRIMARY) HYPERTENSION: ICD-10-CM

## 2018-07-10 DIAGNOSIS — K72.90 HEPATIC FAILURE, UNSPECIFIED WITHOUT COMA: ICD-10-CM

## 2018-07-10 DIAGNOSIS — Z90.49 ACQUIRED ABSENCE OF OTHER SPECIFIED PARTS OF DIGESTIVE TRACT: Chronic | ICD-10-CM

## 2018-07-10 LAB
ALBUMIN SERPL ELPH-MCNC: 3.1 G/DL — LOW (ref 3.3–5)
ALP SERPL-CCNC: 292 U/L — HIGH (ref 40–120)
ALT FLD-CCNC: 25 U/L — SIGNIFICANT CHANGE UP (ref 12–78)
AMMONIA BLD-MCNC: 115 UMOL/L — HIGH (ref 11–32)
ANION GAP SERPL CALC-SCNC: 9 MMOL/L — SIGNIFICANT CHANGE UP (ref 5–17)
APPEARANCE UR: CLEAR — SIGNIFICANT CHANGE UP
AST SERPL-CCNC: 34 U/L — SIGNIFICANT CHANGE UP (ref 15–37)
BASOPHILS # BLD AUTO: 0.05 K/UL — SIGNIFICANT CHANGE UP (ref 0–0.2)
BASOPHILS NFR BLD AUTO: 1.6 % — SIGNIFICANT CHANGE UP (ref 0–2)
BILIRUB SERPL-MCNC: 2.7 MG/DL — HIGH (ref 0.2–1.2)
BILIRUB UR-MCNC: NEGATIVE — SIGNIFICANT CHANGE UP
BUN SERPL-MCNC: 18 MG/DL — SIGNIFICANT CHANGE UP (ref 7–23)
CALCIUM SERPL-MCNC: 9.2 MG/DL — SIGNIFICANT CHANGE UP (ref 8.5–10.1)
CHLORIDE SERPL-SCNC: 104 MMOL/L — SIGNIFICANT CHANGE UP (ref 96–108)
CO2 SERPL-SCNC: 24 MMOL/L — SIGNIFICANT CHANGE UP (ref 22–31)
COLOR SPEC: YELLOW — SIGNIFICANT CHANGE UP
CREAT SERPL-MCNC: 1.1 MG/DL — SIGNIFICANT CHANGE UP (ref 0.5–1.3)
DIFF PNL FLD: NEGATIVE — SIGNIFICANT CHANGE UP
EOSINOPHIL # BLD AUTO: 0.06 K/UL — SIGNIFICANT CHANGE UP (ref 0–0.5)
EOSINOPHIL NFR BLD AUTO: 1.9 % — SIGNIFICANT CHANGE UP (ref 0–6)
GLUCOSE SERPL-MCNC: 249 MG/DL — HIGH (ref 70–99)
GLUCOSE UR QL: 1000 MG/DL
HCT VFR BLD CALC: 33.6 % — LOW (ref 39–50)
HGB BLD-MCNC: 12 G/DL — LOW (ref 13–17)
IMM GRANULOCYTES NFR BLD AUTO: 0.3 % — SIGNIFICANT CHANGE UP (ref 0–1.5)
KETONES UR-MCNC: NEGATIVE — SIGNIFICANT CHANGE UP
LEUKOCYTE ESTERASE UR-ACNC: NEGATIVE — SIGNIFICANT CHANGE UP
LYMPHOCYTES # BLD AUTO: 1.19 K/UL — SIGNIFICANT CHANGE UP (ref 1–3.3)
LYMPHOCYTES # BLD AUTO: 37.3 % — SIGNIFICANT CHANGE UP (ref 13–44)
MCHC RBC-ENTMCNC: 33.3 PG — SIGNIFICANT CHANGE UP (ref 27–34)
MCHC RBC-ENTMCNC: 35.7 GM/DL — SIGNIFICANT CHANGE UP (ref 32–36)
MCV RBC AUTO: 93.3 FL — SIGNIFICANT CHANGE UP (ref 80–100)
MONOCYTES # BLD AUTO: 0.26 K/UL — SIGNIFICANT CHANGE UP (ref 0–0.9)
MONOCYTES NFR BLD AUTO: 8.2 % — SIGNIFICANT CHANGE UP (ref 2–14)
NEUTROPHILS # BLD AUTO: 1.62 K/UL — LOW (ref 1.8–7.4)
NEUTROPHILS NFR BLD AUTO: 50.7 % — SIGNIFICANT CHANGE UP (ref 43–77)
NITRITE UR-MCNC: NEGATIVE — SIGNIFICANT CHANGE UP
PH UR: 8 — SIGNIFICANT CHANGE UP (ref 5–8)
PLATELET # BLD AUTO: 84 K/UL — LOW (ref 150–400)
POTASSIUM SERPL-MCNC: 4.5 MMOL/L — SIGNIFICANT CHANGE UP (ref 3.5–5.3)
POTASSIUM SERPL-SCNC: 4.5 MMOL/L — SIGNIFICANT CHANGE UP (ref 3.5–5.3)
PROT SERPL-MCNC: 7.5 G/DL — SIGNIFICANT CHANGE UP (ref 6–8.3)
PROT UR-MCNC: NEGATIVE — SIGNIFICANT CHANGE UP
RBC # BLD: 3.6 M/UL — LOW (ref 4.2–5.8)
RBC # FLD: 15.3 % — HIGH (ref 10.3–14.5)
SODIUM SERPL-SCNC: 137 MMOL/L — SIGNIFICANT CHANGE UP (ref 135–145)
SP GR SPEC: 1.01 — SIGNIFICANT CHANGE UP (ref 1.01–1.02)
UROBILINOGEN FLD QL: 1
WBC # BLD: 3.19 K/UL — LOW (ref 3.8–10.5)
WBC # FLD AUTO: 3.19 K/UL — LOW (ref 3.8–10.5)

## 2018-07-10 PROCEDURE — 99285 EMERGENCY DEPT VISIT HI MDM: CPT

## 2018-07-10 PROCEDURE — 70450 CT HEAD/BRAIN W/O DYE: CPT | Mod: 26

## 2018-07-10 PROCEDURE — 93010 ELECTROCARDIOGRAM REPORT: CPT

## 2018-07-10 PROCEDURE — 71045 X-RAY EXAM CHEST 1 VIEW: CPT | Mod: 26

## 2018-07-10 RX ORDER — URSODIOL 250 MG/1
300 TABLET, FILM COATED ORAL
Qty: 0 | Refills: 0 | Status: DISCONTINUED | OUTPATIENT
Start: 2018-07-10 | End: 2018-07-12

## 2018-07-10 RX ORDER — METFORMIN HYDROCHLORIDE 850 MG/1
1000 TABLET ORAL
Qty: 0 | Refills: 0 | Status: DISCONTINUED | OUTPATIENT
Start: 2018-07-10 | End: 2018-07-12

## 2018-07-10 RX ORDER — PANTOPRAZOLE SODIUM 20 MG/1
40 TABLET, DELAYED RELEASE ORAL
Qty: 0 | Refills: 0 | Status: DISCONTINUED | OUTPATIENT
Start: 2018-07-10 | End: 2018-07-12

## 2018-07-10 RX ORDER — LANOLIN ALCOHOL/MO/W.PET/CERES
5 CREAM (GRAM) TOPICAL ONCE
Qty: 0 | Refills: 0 | Status: COMPLETED | OUTPATIENT
Start: 2018-07-10 | End: 2018-07-10

## 2018-07-10 RX ORDER — GABAPENTIN 400 MG/1
800 CAPSULE ORAL
Qty: 0 | Refills: 0 | Status: DISCONTINUED | OUTPATIENT
Start: 2018-07-10 | End: 2018-07-12

## 2018-07-10 RX ORDER — LOSARTAN POTASSIUM 100 MG/1
100 TABLET, FILM COATED ORAL DAILY
Qty: 0 | Refills: 0 | Status: DISCONTINUED | OUTPATIENT
Start: 2018-07-10 | End: 2018-07-12

## 2018-07-10 RX ORDER — INSULIN LISPRO 100/ML
VIAL (ML) SUBCUTANEOUS
Qty: 0 | Refills: 0 | Status: DISCONTINUED | OUTPATIENT
Start: 2018-07-10 | End: 2018-07-12

## 2018-07-10 RX ORDER — DEXTROSE 50 % IN WATER 50 %
15 SYRINGE (ML) INTRAVENOUS ONCE
Qty: 0 | Refills: 0 | Status: DISCONTINUED | OUTPATIENT
Start: 2018-07-10 | End: 2018-07-12

## 2018-07-10 RX ORDER — DEXTROSE 50 % IN WATER 50 %
25 SYRINGE (ML) INTRAVENOUS ONCE
Qty: 0 | Refills: 0 | Status: DISCONTINUED | OUTPATIENT
Start: 2018-07-10 | End: 2018-07-12

## 2018-07-10 RX ORDER — SIMVASTATIN 20 MG/1
40 TABLET, FILM COATED ORAL AT BEDTIME
Qty: 0 | Refills: 0 | Status: DISCONTINUED | OUTPATIENT
Start: 2018-07-10 | End: 2018-07-12

## 2018-07-10 RX ORDER — ASPIRIN/CALCIUM CARB/MAGNESIUM 324 MG
81 TABLET ORAL DAILY
Qty: 0 | Refills: 0 | Status: DISCONTINUED | OUTPATIENT
Start: 2018-07-10 | End: 2018-07-12

## 2018-07-10 RX ORDER — SODIUM CHLORIDE 9 MG/ML
1000 INJECTION, SOLUTION INTRAVENOUS
Qty: 0 | Refills: 0 | Status: DISCONTINUED | OUTPATIENT
Start: 2018-07-10 | End: 2018-07-12

## 2018-07-10 RX ORDER — LACTULOSE 10 G/15ML
30 SOLUTION ORAL ONCE
Qty: 0 | Refills: 0 | Status: COMPLETED | OUTPATIENT
Start: 2018-07-10 | End: 2018-07-10

## 2018-07-10 RX ORDER — GLUCAGON INJECTION, SOLUTION 0.5 MG/.1ML
1 INJECTION, SOLUTION SUBCUTANEOUS ONCE
Qty: 0 | Refills: 0 | Status: DISCONTINUED | OUTPATIENT
Start: 2018-07-10 | End: 2018-07-12

## 2018-07-10 RX ORDER — SODIUM CHLORIDE 9 MG/ML
1000 INJECTION INTRAMUSCULAR; INTRAVENOUS; SUBCUTANEOUS ONCE
Qty: 0 | Refills: 0 | Status: COMPLETED | OUTPATIENT
Start: 2018-07-10 | End: 2018-07-10

## 2018-07-10 RX ORDER — INSULIN LISPRO 100/ML
5 VIAL (ML) SUBCUTANEOUS
Qty: 0 | Refills: 0 | Status: DISCONTINUED | OUTPATIENT
Start: 2018-07-10 | End: 2018-07-12

## 2018-07-10 RX ORDER — INSULIN GLARGINE 100 [IU]/ML
80 INJECTION, SOLUTION SUBCUTANEOUS AT BEDTIME
Qty: 0 | Refills: 0 | Status: DISCONTINUED | OUTPATIENT
Start: 2018-07-10 | End: 2018-07-12

## 2018-07-10 RX ORDER — LACTULOSE 10 G/15ML
30 SOLUTION ORAL THREE TIMES A DAY
Qty: 0 | Refills: 0 | Status: DISCONTINUED | OUTPATIENT
Start: 2018-07-10 | End: 2018-07-12

## 2018-07-10 RX ORDER — SODIUM CHLORIDE 9 MG/ML
1000 INJECTION INTRAMUSCULAR; INTRAVENOUS; SUBCUTANEOUS
Qty: 0 | Refills: 0 | Status: DISCONTINUED | OUTPATIENT
Start: 2018-07-10 | End: 2018-07-10

## 2018-07-10 RX ORDER — DEXTROSE 50 % IN WATER 50 %
12.5 SYRINGE (ML) INTRAVENOUS ONCE
Qty: 0 | Refills: 0 | Status: DISCONTINUED | OUTPATIENT
Start: 2018-07-10 | End: 2018-07-12

## 2018-07-10 RX ADMIN — LACTULOSE 30 GRAM(S): 10 SOLUTION ORAL at 23:29

## 2018-07-10 RX ADMIN — SODIUM CHLORIDE 1000 MILLILITER(S): 9 INJECTION INTRAMUSCULAR; INTRAVENOUS; SUBCUTANEOUS at 13:42

## 2018-07-10 RX ADMIN — INSULIN GLARGINE 80 UNIT(S): 100 INJECTION, SOLUTION SUBCUTANEOUS at 23:30

## 2018-07-10 RX ADMIN — SODIUM CHLORIDE 1000 MILLILITER(S): 9 INJECTION INTRAMUSCULAR; INTRAVENOUS; SUBCUTANEOUS at 12:15

## 2018-07-10 RX ADMIN — Medication 5 MILLIGRAM(S): at 23:28

## 2018-07-10 RX ADMIN — GABAPENTIN 800 MILLIGRAM(S): 400 CAPSULE ORAL at 23:28

## 2018-07-10 RX ADMIN — Medication 5 UNIT(S): at 18:42

## 2018-07-10 RX ADMIN — Medication 0.5 MILLIGRAM(S): at 20:00

## 2018-07-10 RX ADMIN — Medication 4: at 18:43

## 2018-07-10 RX ADMIN — METFORMIN HYDROCHLORIDE 1000 MILLIGRAM(S): 850 TABLET ORAL at 19:32

## 2018-07-10 RX ADMIN — LACTULOSE 30 GRAM(S): 10 SOLUTION ORAL at 13:46

## 2018-07-10 RX ADMIN — SIMVASTATIN 40 MILLIGRAM(S): 20 TABLET, FILM COATED ORAL at 23:29

## 2018-07-10 RX ADMIN — URSODIOL 300 MILLIGRAM(S): 250 TABLET, FILM COATED ORAL at 19:33

## 2018-07-10 RX ADMIN — GABAPENTIN 400 MILLIGRAM(S): 400 CAPSULE ORAL at 19:30

## 2018-07-10 NOTE — ED PROVIDER NOTE - OBJECTIVE STATEMENT
62 y male accompanied to ed by son, who states patient has been "confused" x 2 days, no fever no nvd, no recent illness,  patient oriented x 2, states he has been feeling "tired",   as per nurses note, and son states Dr montalvo advised patient come to ed for evaluation today, has hx of elevated ammonia level, hx of hepatic encephalopathy, admitted Feb 2018 for ammonia level 134.  patient denies pain.  nonsmoker.  no injury, or trauma.  states he did not eat anything today.  PMD htn, iddm, neuropathy, renal stones, hld.  PMD Dr Coto

## 2018-07-10 NOTE — ED ADULT NURSE NOTE - OBJECTIVE STATEMENT
brought ED by son's for change in mental status since yesterday.  O to person/place. confused to time very forgetful.  moving all extremities strong speech cl. Denies pain

## 2018-07-10 NOTE — ED ADULT TRIAGE NOTE - CHIEF COMPLAINT QUOTE
Son reports AMS and confusion for past 2 days.  Son called Dr Morales office and was told to come to ER for further eval.  possible elevated ammonia level.  blood work last checked a "few months ago"

## 2018-07-10 NOTE — H&P ADULT - HISTORY OF PRESENT ILLNESS
62 y male accompanied to ed by son, who states patient has been "confused" x 2 days, no fever no nvd, no recent illness,  patient oriented x 1 states he has been feeling "tired",   as per nurses note, and son states Dr montalvo advised patient come to ed for evaluation today, has hx of elevated ammonia level, hx of hepatic encephalopathy, admitted Feb 2018 for ammonia level 134.  patient denies pain.  nonsmoker.  no injury, or trauma.  states he did not eat anything today.  In er pt confused and wife at bedside and states this happened last time. old chart from 2/2018 noted.

## 2018-07-10 NOTE — ED PROVIDER NOTE - PROGRESS NOTE DETAILS
call out to Dr LYN franks,  masood Franks is on , call out to Dr ZAHIDA franks spoke with Dr ZAHIDA Franks, case discussed, available results discussed, advised admit to her service

## 2018-07-10 NOTE — CONSULT NOTE ADULT - SUBJECTIVE AND OBJECTIVE BOX
Chief Complaint:  Patient is a 62y old  Male who presents with a chief complaint of   Renal stones  Hypertension  Neuropathy  Hypercholesteremia  Diabetes  S/P cholecystectomy  No significant past surgical history     HPI:      codeine (Anaphylaxis)            FAMILY HISTORY:  No pertinent family history in first degree relatives        Review of Systems:    General:  No wt loss, fevers, chills, night sweats,fatigue,   Eyes:  Good vision, no reported pain  ENT:  No sore throat, pain, runny nose, dysphagia  CV:  No pain, palpitatioins, hypo/hypertension  Resp:  No dyspnea, cough, tachypnea, wheezing  :  No pain, bleeding, incontinence, nocturia  Muscle:  No pain, weakness  Neuro:  No weakness, tingling, memory problems  Psych:  No fatigue, insomnia, mood problems, depression  Endocrine:  No polyuria, polydypsia, cold/heat intolerance  Heme:  No petechiae, ecchymosis, easy bruisability  Skin:  No rash, tattoos, scars, edema    Relevant Family History:       Relevant Social History:       Physical Exam:    Vital Signs:  Vital Signs Last 24 Hrs  T(C): 36.9 (10 Jul 2018 16:01), Max: 36.9 (10 Jul 2018 16:01)  T(F): 98.5 (10 Jul 2018 16:01), Max: 98.5 (10 Jul 2018 16:01)  HR: 99 (10 Jul 2018 16:) (96 - 101)  BP: 142/68 (10 Jul 2018 16:01) (142/68 - 158/75)  BP(mean): --  RR: 16 (10 Jul 2018 16:) (16 - 18)  SpO2: 97% (10 Jul 2018 16:) (95% - 97%)  Daily Height in cm: 182.88 (10 Jul 2018 11:34)    Daily     General:  Appears stated age, well-groomed, well-nourished, no distress  HEENT:  NC/AT,  conjunctivae clear and pink, no thyromegaly, nodules, adenopathy, no JVD  Chest:  Full & symmetric excursion, no increased effort, breath sounds clear  Cardiovascular:  Regular rhythm, S1, S2, no murmur/rub/S3/S4, no abdominal bruit, no edema  Abdomen:  Soft, non-tender, non-distended, normoactive bowel sounds,  no masses ,no hepatosplenomeagaly, no signs of chronic liver disease  Extremities:  no cyanosis,clubbing or edema  Skin:  No rash/erythema/ecchymoses/petechiae/wounds/abscess/warm/dry  Neuro/Psych:  Alert, oriented, no asterixis, no tremor, no encephalopathy    Laboratory:                            12.0   3.19  )-----------( 84       ( 10 Jul 2018 12:14 )             33.6     07-10    137  |  104  |  18  ----------------------------<  249<H>  4.5   |  24  |  1.10    Ca    9.2      10 Jul 2018 12:14    TPro  7.5  /  Alb  3.1<L>  /  TBili  2.7<H>  /  DBili  x   /  AST  34  /  ALT  25  /  AlkPhos  292<H>  07-10    LIVER FUNCTIONS - ( 10 Jul 2018 12:14 )  Alb: 3.1 g/dL / Pro: 7.5 g/dL / ALK PHOS: 292 U/L / ALT: 25 U/L / AST: 34 U/L / GGT: x             Urinalysis Basic - ( 10 Jul 2018 12:19 )    Color: Yellow / Appearance: Clear / S.010 / pH: x  Gluc: x / Ketone: Negative  / Bili: Negative / Urobili: 1   Blood: x / Protein: Negative / Nitrite: Negative   Leuk Esterase: Negative / RBC: x / WBC x   Sq Epi: x / Non Sq Epi: x / Bacteria: x      Amylase Serum--      Lipase serum--       [id number]    Imaging:

## 2018-07-10 NOTE — ED PROVIDER NOTE - ATTENDING CONTRIBUTION TO CARE
63 yo male hx of DM, HLD, HTN BIB sons c/o AMS/confusion x 2 days.  No complaints of any pain, just not acting himself.  No fever/chills.  No nausea/vomiting.      Gen: AAO x 1 (person only), NAD  Head/eyes: NC/AT, PERRL, EOMI, normal lids/conjunctiva, on scleral icterus  ENT: Bilateral TM WNL, normal hearing, patent oropharynx without erythema/exudate, uvula midline, no peritonsillar abscess, no tongue/uvula swelling  Neck: supple, no tenderness/meningismus/JVD, Trachea midline  Pulm: Bilateral clear BS, normal resp effort, no wheeze/stridor/retractions  CV: RRR, no M/R/G, +2 dist pulses (radial, pedal DP/PT, popliteal)  Abd: soft, NT/ND, +BS, no guarding/rebound tenderness  Musculoskeletal: no edema/erythema/cyanosis  Skin: no rash, no vesicles, no petechaie, no ecchymosis, no swelling  Neuro: AAOx3, CN 2-12 intact, normal sensation, 5/5 motor strength in all extremities, normal gait, no dysmetria     Ammonia level >100, likely hepatic encephalopathy, PO lactulose given, CT head negative, patient became agitated, wants to leave, but does not have capacity to make decision, placed on 1:1

## 2018-07-10 NOTE — ED ADULT NURSE REASSESSMENT NOTE - NS ED NURSE REASSESS COMMENT FT1
when attempting to transfer pt to floor pt got up said he was leaving in hospital gown and soaks.  pt disoriented to time.  agitated  [facility] flight called.   Dr Franks made aware ativan 0.5mg IV given.  pt wife present.  was able to convince pt to lie on strecher and transferred pt to [facility]. pt placed near nurses station with continued 1 to 1 observation

## 2018-07-10 NOTE — H&P ADULT - PMH
Diabetes    Fatty liver disease, nonalcoholic    Hypercholesteremia    Hypertension    Neuropathy    Obesity    Renal stones  25 years ago

## 2018-07-10 NOTE — H&P ADULT - PROBLEM SELECTOR PLAN 1
old chart noted  us from 2/2018 noted  had recent egd with gi which was normal per wife  gi eval noted  increase lactulose  rifaxamin  trend lactate

## 2018-07-10 NOTE — ED PROVIDER NOTE - CARE PLAN
Principal Discharge DX:	Change in mental status Principal Discharge DX:	Change in mental status  Secondary Diagnosis:	Hepatic encephalopathy

## 2018-07-11 LAB
AFP-TM SERPL-MCNC: 4 NG/ML — SIGNIFICANT CHANGE UP
AMMONIA BLD-MCNC: 59 UMOL/L — HIGH (ref 11–32)
ANION GAP SERPL CALC-SCNC: 8 MMOL/L — SIGNIFICANT CHANGE UP (ref 5–17)
BUN SERPL-MCNC: 17 MG/DL — SIGNIFICANT CHANGE UP (ref 7–23)
CALCIUM SERPL-MCNC: 8.7 MG/DL — SIGNIFICANT CHANGE UP (ref 8.5–10.1)
CHLORIDE SERPL-SCNC: 108 MMOL/L — SIGNIFICANT CHANGE UP (ref 96–108)
CO2 SERPL-SCNC: 24 MMOL/L — SIGNIFICANT CHANGE UP (ref 22–31)
CREAT SERPL-MCNC: 0.93 MG/DL — SIGNIFICANT CHANGE UP (ref 0.5–1.3)
CULTURE RESULTS: NO GROWTH — SIGNIFICANT CHANGE UP
GLUCOSE SERPL-MCNC: 126 MG/DL — HIGH (ref 70–99)
HBA1C BLD-MCNC: 10.4 % — HIGH (ref 4–5.6)
HCT VFR BLD CALC: 31.2 % — LOW (ref 39–50)
HGB BLD-MCNC: 11.1 G/DL — LOW (ref 13–17)
MCHC RBC-ENTMCNC: 33.3 PG — SIGNIFICANT CHANGE UP (ref 27–34)
MCHC RBC-ENTMCNC: 35.6 GM/DL — SIGNIFICANT CHANGE UP (ref 32–36)
MCV RBC AUTO: 93.7 FL — SIGNIFICANT CHANGE UP (ref 80–100)
NRBC # BLD: 0 /100 WBCS — SIGNIFICANT CHANGE UP (ref 0–0)
PLATELET # BLD AUTO: 87 K/UL — LOW (ref 150–400)
POTASSIUM SERPL-MCNC: 4.1 MMOL/L — SIGNIFICANT CHANGE UP (ref 3.5–5.3)
POTASSIUM SERPL-SCNC: 4.1 MMOL/L — SIGNIFICANT CHANGE UP (ref 3.5–5.3)
RBC # BLD: 3.33 M/UL — LOW (ref 4.2–5.8)
RBC # FLD: 15.7 % — HIGH (ref 10.3–14.5)
SODIUM SERPL-SCNC: 140 MMOL/L — SIGNIFICANT CHANGE UP (ref 135–145)
SPECIMEN SOURCE: SIGNIFICANT CHANGE UP
WBC # BLD: 3.78 K/UL — LOW (ref 3.8–10.5)
WBC # FLD AUTO: 3.78 K/UL — LOW (ref 3.8–10.5)

## 2018-07-11 RX ADMIN — LACTULOSE 30 GRAM(S): 10 SOLUTION ORAL at 22:23

## 2018-07-11 RX ADMIN — GABAPENTIN 800 MILLIGRAM(S): 400 CAPSULE ORAL at 17:28

## 2018-07-11 RX ADMIN — INSULIN GLARGINE 80 UNIT(S): 100 INJECTION, SOLUTION SUBCUTANEOUS at 22:24

## 2018-07-11 RX ADMIN — PANTOPRAZOLE SODIUM 40 MILLIGRAM(S): 20 TABLET, DELAYED RELEASE ORAL at 06:14

## 2018-07-11 RX ADMIN — Medication 5 UNIT(S): at 17:28

## 2018-07-11 RX ADMIN — GABAPENTIN 800 MILLIGRAM(S): 400 CAPSULE ORAL at 06:14

## 2018-07-11 RX ADMIN — LACTULOSE 30 GRAM(S): 10 SOLUTION ORAL at 06:14

## 2018-07-11 RX ADMIN — URSODIOL 300 MILLIGRAM(S): 250 TABLET, FILM COATED ORAL at 06:13

## 2018-07-11 RX ADMIN — Medication 5 UNIT(S): at 08:36

## 2018-07-11 RX ADMIN — GABAPENTIN 800 MILLIGRAM(S): 400 CAPSULE ORAL at 23:02

## 2018-07-11 RX ADMIN — METFORMIN HYDROCHLORIDE 1000 MILLIGRAM(S): 850 TABLET ORAL at 17:28

## 2018-07-11 RX ADMIN — LACTULOSE 30 GRAM(S): 10 SOLUTION ORAL at 14:25

## 2018-07-11 RX ADMIN — LOSARTAN POTASSIUM 100 MILLIGRAM(S): 100 TABLET, FILM COATED ORAL at 06:14

## 2018-07-11 RX ADMIN — METFORMIN HYDROCHLORIDE 1000 MILLIGRAM(S): 850 TABLET ORAL at 08:36

## 2018-07-11 RX ADMIN — GABAPENTIN 800 MILLIGRAM(S): 400 CAPSULE ORAL at 11:56

## 2018-07-11 RX ADMIN — Medication 81 MILLIGRAM(S): at 11:56

## 2018-07-11 RX ADMIN — Medication 2: at 17:28

## 2018-07-11 RX ADMIN — SIMVASTATIN 40 MILLIGRAM(S): 20 TABLET, FILM COATED ORAL at 22:23

## 2018-07-11 RX ADMIN — URSODIOL 300 MILLIGRAM(S): 250 TABLET, FILM COATED ORAL at 17:27

## 2018-07-11 RX ADMIN — Medication 5 UNIT(S): at 12:01

## 2018-07-11 NOTE — PROGRESS NOTE ADULT - PROBLEM SELECTOR PLAN 1
improved today  due to fatty liver disease  us from 2/2018 noted  had recent egd with gi which was normal per wife  gi eval noted  increase lactulose  rifaxamin

## 2018-07-11 NOTE — PROGRESS NOTE ADULT - SUBJECTIVE AND OBJECTIVE BOX
Patient is a 62y old  Male who presents with a chief complaint of altered mental status (2018 00:48)      INTERVAL HPI/OVERNIGHT EVENTS: stable, less confused today, wife at bedside    MEDICATIONS  (STANDING):  aspirin enteric coated 81 milliGRAM(s) Oral daily  dextrose 5%. 1000 milliLiter(s) (50 mL/Hr) IV Continuous <Continuous>  dextrose 50% Injectable 12.5 Gram(s) IV Push once  dextrose 50% Injectable 25 Gram(s) IV Push once  dextrose 50% Injectable 25 Gram(s) IV Push once  gabapentin 800 milliGRAM(s) Oral four times a day  insulin glargine Injectable (LANTUS) 80 Unit(s) SubCutaneous at bedtime  insulin lispro (HumaLOG) corrective regimen sliding scale   SubCutaneous three times a day before meals  insulin lispro Injectable (HumaLOG) 5 Unit(s) SubCutaneous three times a day before meals  lactulose Syrup 30 Gram(s) Oral three times a day  losartan 100 milliGRAM(s) Oral daily  metFORMIN 1000 milliGRAM(s) Oral two times a day  pantoprazole    Tablet 40 milliGRAM(s) Oral before breakfast  rifaximin 550 milliGRAM(s) Oral two times a day  simvastatin 40 milliGRAM(s) Oral at bedtime  ursodiol Capsule 300 milliGRAM(s) Oral two times a day    MEDICATIONS  (PRN):  dextrose 40% Gel 15 Gram(s) Oral once PRN Blood Glucose LESS THAN 70 milliGRAM(s)/deciliter  glucagon  Injectable 1 milliGRAM(s) IntraMuscular once PRN Glucose LESS THAN 70 milligrams/deciliter      Allergies    codeine (Anaphylaxis)    Intolerances        REVIEW OF SYSTEMS:  CONSTITUTIONAL: No fever, weight loss, or fatigue  EYES: No eye pain, visual disturbances  ENMT:  No difficulty hearing, tinnitus, vertigo; No sinus or throat pain  NECK: No pain or stiffness  RESPIRATORY: No cough, wheezing, chills or hemoptysis; No shortness of breath  CARDIOVASCULAR: No chest pain, palpitations, dizziness  GASTROINTESTINAL: No abdominal or epigastric pain. No nausea, vomiting, or hematemesis; No diarrhea or constipation. No melena or hematochezia.  GENITOURINARY: No dysuria, frequency, hematuria, or incontinence  NEUROLOGICAL: less confused  SKIN: No itching, burning  LYMPH NODES: No enlarged glands  MUSCULOSKELETAL: No joint pain or swelling; No muscle, back, or extremity pain  PSYCHIATRIC: No depression, mood swings  HEME/LYMPH: No easy bruising, or bleeding gums  ALLERGY AND IMMUNOLOGIC: No hives    Vital Signs Last 24 Hrs  T(C): 36.4 (2018 05:23), Max: 36.9 (10 Jul 2018 16:01)  T(F): 97.6 (2018 05:23), Max: 98.5 (10 Jul 2018 16:01)  HR: 84 (2018 05:23) (84 - 104)  BP: 119/69 (2018 05:23) (119/69 - 162/78)  BP(mean): --  RR: 18 (2018 05:23) (16 - 19)  SpO2: 96% (2018 05:23) (96% - 98%)    PHYSICAL EXAM:  GENERAL: NAD, well-groomed, well-developed  HEAD:  Atraumatic, Normocephalic  EYES: EOMI, PERRLA, conjunctiva and sclera clear  ENMT: No tonsillar erythema, exudates, or enlargement   NECK: Supple, No JVD  NERVOUS SYSTEM:  Alert & Oriented X3, Good concentration  CHEST/LUNG: Clear to auscultation bilaterally; No rales, rhonchi, wheezing  HEART: Regular rate and rhythm  ABDOMEN: Soft, Nontender, Nondistended; Bowel sounds present  EXTREMITIES:  2+ Peripheral Pulses   LYMPH: No lymphadenopathy noted  SKIN: No rashes     LABS:                        11.1   3.78  )-----------( 87       ( 2018 07:15 )             31.2     2018 07:15    140    |  108    |  17     ----------------------------<  126    4.1     |  24     |  0.93     Ca    8.7        2018 07:15    TPro  7.5    /  Alb  3.1    /  TBili  2.7    /  DBili  x      /  AST  34     /  ALT  25     /  AlkPhos  292    10 Jul 2018 12:14      Urinalysis Basic - ( 10 Jul 2018 12:19 )    Color: Yellow / Appearance: Clear / S.010 / pH: x  Gluc: x / Ketone: Negative  / Bili: Negative / Urobili: 1   Blood: x / Protein: Negative / Nitrite: Negative   Leuk Esterase: Negative / RBC: x / WBC x   Sq Epi: x / Non Sq Epi: x / Bacteria: x      CAPILLARY BLOOD GLUCOSE      POCT Blood Glucose.: 116 mg/dL (2018 07:52)  POCT Blood Glucose.: 219 mg/dL (10 Jul 2018 23:22)  POCT Blood Glucose.: 212 mg/dL (10 Jul 2018 18:32)  POCT Blood Glucose.: 271 mg/dL (10 Jul 2018 12:01)            RADIOLOGY & ADDITIONAL TESTS:  no new      Consultant(s) Notes Reviewed:  [ x] YES  [ ] NO    Care Discussed with Consultants/Other Providers [x ] YES  [ ] NO    Advanced care planning discussed with patient and family, advanced care planning forms reviewed, discussed, and completed.  20 minutes spent.

## 2018-07-11 NOTE — PROGRESS NOTE ADULT - SUBJECTIVE AND OBJECTIVE BOX
INTERVAL HPI/OVERNIGHT EVENTS:  pt seen and examined  denies n/v/d/abd pain, reports +bm this am  labs noted afebrile overnight    MEDICATIONS  (STANDING):  aspirin enteric coated 81 milliGRAM(s) Oral daily  dextrose 5%. 1000 milliLiter(s) (50 mL/Hr) IV Continuous <Continuous>  dextrose 50% Injectable 12.5 Gram(s) IV Push once  dextrose 50% Injectable 25 Gram(s) IV Push once  dextrose 50% Injectable 25 Gram(s) IV Push once  gabapentin 800 milliGRAM(s) Oral four times a day  insulin glargine Injectable (LANTUS) 80 Unit(s) SubCutaneous at bedtime  insulin lispro (HumaLOG) corrective regimen sliding scale   SubCutaneous three times a day before meals  insulin lispro Injectable (HumaLOG) 5 Unit(s) SubCutaneous three times a day before meals  lactulose Syrup 30 Gram(s) Oral three times a day  losartan 100 milliGRAM(s) Oral daily  metFORMIN 1000 milliGRAM(s) Oral two times a day  pantoprazole    Tablet 40 milliGRAM(s) Oral before breakfast  rifaximin 550 milliGRAM(s) Oral two times a day  simvastatin 40 milliGRAM(s) Oral at bedtime  ursodiol Capsule 300 milliGRAM(s) Oral two times a day    MEDICATIONS  (PRN):  dextrose 40% Gel 15 Gram(s) Oral once PRN Blood Glucose LESS THAN 70 milliGRAM(s)/deciliter  glucagon  Injectable 1 milliGRAM(s) IntraMuscular once PRN Glucose LESS THAN 70 milligrams/deciliter      Allergies    codeine (Anaphylaxis)    Intolerances        Review of Systems:    General:  No wt loss, fevers, chills, night sweats, fatigue   Eyes:  Good vision, no reported pain  ENT:  No sore throat, pain, runny nose, dysphagia  CV:  No pain, palpitations, hypo/hypertension  Resp:  No dyspnea, cough, tachypnea, wheezing  GI:  No pain, No nausea, No vomiting, No diarrhea, No constipation, No weight loss, No fever, No pruritis, No rectal bleeding, No melena, No dysphagia  :  No pain, bleeding, incontinence, nocturia  Muscle:  No pain, weakness  Neuro:  No weakness, tingling, memory problems  Psych:  No fatigue, insomnia, mood problems, depression  Endocrine:  No polyuria, polydypsia, cold/heat intolerance  Heme:  No petechiae, ecchymosis, easy bruisability  Skin:  No rash, tattoos, scars, edema      Vital Signs Last 24 Hrs  T(C): 36.4 (2018 05:23), Max: 36.9 (10 Jul 2018 16:01)  T(F): 97.6 (2018 05:23), Max: 98.5 (10 Jul 2018 16:01)  HR: 84 (2018 05:23) (84 - 104)  BP: 119/69 (2018 05:23) (119/69 - 162/78)  BP(mean): --  RR: 18 (2018 05:23) (16 - 19)  SpO2: 96% (2018 05:23) (95% - 98%)    PHYSICAL EXAM:    Constitutional: NAD, oob in chair  HEENT: EOMI, perrl  Neck: No LAD  Respiratory: CTA   Cardiovascular: S1 and S2, RR  Gastrointestinal: obese abd, BS+, soft, NT, nd  Extremities: trace edema  Vascular: 2+ peripheral pulses  Neurological: alert and oriented to person place and time presently  Skin: No rashes      LABS:                        11.1   3.78  )-----------( 87       ( 2018 07:15 )             31.2     07-11    140  |  108  |  17  ----------------------------<  126<H>  4.1   |  24  |  0.93    Ca    8.7      2018 07:15    TPro  7.5  /  Alb  3.1<L>  /  TBili  2.7<H>  /  DBili  x   /  AST  34  /  ALT  25  /  AlkPhos  292<H>  07-10      Urinalysis Basic - ( 10 Jul 2018 12:19 )    Color: Yellow / Appearance: Clear / S.010 / pH: x  Gluc: x / Ketone: Negative  / Bili: Negative / Urobili: 1   Blood: x / Protein: Negative / Nitrite: Negative   Leuk Esterase: Negative / RBC: x / WBC x   Sq Epi: x / Non Sq Epi: x / Bacteria: x        RADIOLOGY & ADDITIONAL TESTS:

## 2018-07-11 NOTE — DIETITIAN INITIAL EVALUATION ADULT. - PERTINENT MEDS FT
Xifaxin, Neurontin, Lantus, Humalog ( sliding scale coverage and 5 units before meals) , Lactulose, Cozaar, Zocor, Glucophage

## 2018-07-11 NOTE — DIETITIAN INITIAL EVALUATION ADULT. - OTHER INFO
Pt reports he is eating well, Nursing charting 100% served. pt reports loose BM's "due to that medicine " ,likely lactulose for high NH3 l3vel, takes Vit K and Vit D at home and recently told he needed to start taking Iron as his Iron level was low. He reports he has been heavier but the ^ was due to fluid and despite trying to lose weight by eating healthier and less has been unsuccessful. Wife reports he stopped eating  red meat in january, eats more salads, drinks water, no soda, etoh and eats no candy. Pt complaining that a low salt diet is bland. Pt made aware that he can use any spice in food to make it more flavorful as long as it does not contain sodium. His skin is intact. Pt with pmhx as below , admitted for AMS due to hepatic encephalopathy . Pt is morbidly obese appearing. Pt might be underestimating what he actually eats. Wife and daughter endorse that they hardly see him eating anything at home. A1c c/w poorly controlled DM. Pt was provided with  DM meal planning guide, HTN and TLC nutrition therapy, verbally reviewed. Pt has my name and contact number should questions arise in regard to diet. The current diet is appropriate. Pt reports he is eating well, Nursing charting 100% served. pt reports loose BM's "due to that medicine " ,likely lactulose for high NH3 l3vel, takes Vit K and Vit D at home and recently told he needed to start taking Iron as his Iron level was low. I do not see any labs to support this.  He reports he has been heavier but the ^ was due to fluid and despite trying to lose weight by eating healthier and less has been unsuccessful. Wife reports he stopped eating  red meat in january, eats more salads, drinks water, no soda, etoh and eats no candy. Pt complaining that a low salt diet is bland. Pt made aware that he can use any spice in food to make it more flavorful as long as it does not contain sodium. His skin is intact. Pt with pmhx as below , admitted for AMS due to hepatic encephalopathy . Pt is morbidly obese appearing. Pt might be underestimating what he actually eats. Wife and daughter endorse that they hardly see him eating anything at home. A1c c/w poorly controlled DM. Pt was provided with  DM meal planning guide, HTN and TLC nutrition therapy, verbally reviewed. Pt has my name and contact number should questions arise in regard to diet. The current diet is appropriate.

## 2018-07-11 NOTE — PATIENT PROFILE ADULT. - NS TRANSFER PATIENT BELONGINGS
Cell Phone/PDA (specify)/Electronic Device (specify)/Other belongings/Clothing Clothing/Other belongings

## 2018-07-11 NOTE — DIETITIAN INITIAL EVALUATION ADULT. - PERTINENT LABORATORY DATA
( 7-10-18) NH3 115( H) ( 7-11-18)  A1c 10.4( H) 59( H) but trending down ( 7-10-18) NH3 115( H) ( 7-11-18)  A1c 10.4( H) 59( H) but trending down . fe panel and ferritin level not seen ( 2-6-18) Folate 16.7, vit B12- 772

## 2018-07-11 NOTE — PROGRESS NOTE ADULT - PROBLEM SELECTOR PLAN 1
pt with hx of fatty liver  US 2/2018 noted, with liver lab w/u at that time, neg with exception of +asca  HE improving, ammonia level downtrending  repeat hepatic panel ordered for am, coags ordered  cont lactulose, titrate for 3 soft bms/day  cont xifaxin 550 mg bid  cont ppi, sandra  low protein/fat diet  will need close outpatient gi f/u upon dc

## 2018-07-11 NOTE — PROGRESS NOTE ADULT - ATTENDING COMMENTS
Advanced care planning was discussed with patient and family.  Advanced care planning forms were reviewed and discussed.  Risks, benefits and alternatives of gastroenterologic procedures were discussed in detail and all questions were answered.    30 minutes spent.
gi noted  discussed with wife

## 2018-07-12 ENCOUNTER — TRANSCRIPTION ENCOUNTER (OUTPATIENT)
Age: 63
End: 2018-07-12

## 2018-07-12 VITALS
TEMPERATURE: 98 F | RESPIRATION RATE: 18 BRPM | SYSTOLIC BLOOD PRESSURE: 117 MMHG | HEART RATE: 86 BPM | DIASTOLIC BLOOD PRESSURE: 65 MMHG | OXYGEN SATURATION: 95 %

## 2018-07-12 LAB
A1AT SERPL-MCNC: 139 MG/DL — SIGNIFICANT CHANGE UP (ref 90–200)
ALBUMIN SERPL ELPH-MCNC: 3 G/DL — LOW (ref 3.3–5)
ALP SERPL-CCNC: 135 U/L — HIGH (ref 40–120)
ALT FLD-CCNC: 29 U/L — SIGNIFICANT CHANGE UP (ref 12–78)
AMMONIA BLD-MCNC: 51 UMOL/L — HIGH (ref 11–32)
ANION GAP SERPL CALC-SCNC: 9 MMOL/L — SIGNIFICANT CHANGE UP (ref 5–17)
AST SERPL-CCNC: 48 U/L — HIGH (ref 15–37)
BILIRUB DIRECT SERPL-MCNC: 0.8 MG/DL — HIGH (ref 0.05–0.2)
BILIRUB INDIRECT FLD-MCNC: 2.1 MG/DL — HIGH (ref 0.2–1)
BILIRUB SERPL-MCNC: 2.9 MG/DL — HIGH (ref 0.2–1.2)
BUN SERPL-MCNC: 16 MG/DL — SIGNIFICANT CHANGE UP (ref 7–23)
CALCIUM SERPL-MCNC: 9.1 MG/DL — SIGNIFICANT CHANGE UP (ref 8.5–10.1)
CERULOPLASMIN SERPL-MCNC: 26 MG/DL — SIGNIFICANT CHANGE UP (ref 20–60)
CHLORIDE SERPL-SCNC: 107 MMOL/L — SIGNIFICANT CHANGE UP (ref 96–108)
CO2 SERPL-SCNC: 25 MMOL/L — SIGNIFICANT CHANGE UP (ref 22–31)
CREAT SERPL-MCNC: 1.1 MG/DL — SIGNIFICANT CHANGE UP (ref 0.5–1.3)
GLUCOSE SERPL-MCNC: 102 MG/DL — HIGH (ref 70–99)
HAV IGM SER-ACNC: SIGNIFICANT CHANGE UP
HBV CORE IGM SER-ACNC: SIGNIFICANT CHANGE UP
HBV SURFACE AG SER-ACNC: SIGNIFICANT CHANGE UP
HCT VFR BLD CALC: 35.3 % — LOW (ref 39–50)
HCV AB S/CO SERPL IA: 0.19 S/CO — SIGNIFICANT CHANGE UP
HCV AB SERPL-IMP: SIGNIFICANT CHANGE UP
HGB BLD-MCNC: 12 G/DL — LOW (ref 13–17)
INR BLD: 1.21 RATIO — HIGH (ref 0.88–1.16)
MCHC RBC-ENTMCNC: 32.7 PG — SIGNIFICANT CHANGE UP (ref 27–34)
MCHC RBC-ENTMCNC: 34 GM/DL — SIGNIFICANT CHANGE UP (ref 32–36)
MCV RBC AUTO: 96.2 FL — SIGNIFICANT CHANGE UP (ref 80–100)
MITOCHONDRIA AB SER-ACNC: SIGNIFICANT CHANGE UP
NRBC # BLD: 0 /100 WBCS — SIGNIFICANT CHANGE UP (ref 0–0)
PLATELET # BLD AUTO: 90 K/UL — LOW (ref 150–400)
POTASSIUM SERPL-MCNC: 4.1 MMOL/L — SIGNIFICANT CHANGE UP (ref 3.5–5.3)
POTASSIUM SERPL-SCNC: 4.1 MMOL/L — SIGNIFICANT CHANGE UP (ref 3.5–5.3)
PROT SERPL-MCNC: 7.1 G/DL — SIGNIFICANT CHANGE UP (ref 6–8.3)
PROTHROM AB SERPL-ACNC: 13.2 SEC — HIGH (ref 9.8–12.7)
RBC # BLD: 3.67 M/UL — LOW (ref 4.2–5.8)
RBC # FLD: 15.6 % — HIGH (ref 10.3–14.5)
SMOOTH MUSCLE AB SER-ACNC: SIGNIFICANT CHANGE UP
SODIUM SERPL-SCNC: 141 MMOL/L — SIGNIFICANT CHANGE UP (ref 135–145)
WBC # BLD: 3.73 K/UL — LOW (ref 3.8–10.5)
WBC # FLD AUTO: 3.73 K/UL — LOW (ref 3.8–10.5)

## 2018-07-12 PROCEDURE — 82140 ASSAY OF AMMONIA: CPT

## 2018-07-12 PROCEDURE — 85027 COMPLETE CBC AUTOMATED: CPT

## 2018-07-12 PROCEDURE — 82728 ASSAY OF FERRITIN: CPT

## 2018-07-12 PROCEDURE — 82390 ASSAY OF CERULOPLASMIN: CPT

## 2018-07-12 PROCEDURE — 80053 COMPREHEN METABOLIC PANEL: CPT

## 2018-07-12 PROCEDURE — 83036 HEMOGLOBIN GLYCOSYLATED A1C: CPT

## 2018-07-12 PROCEDURE — 99285 EMERGENCY DEPT VISIT HI MDM: CPT | Mod: 25

## 2018-07-12 PROCEDURE — 86376 MICROSOMAL ANTIBODY EACH: CPT

## 2018-07-12 PROCEDURE — 81003 URINALYSIS AUTO W/O SCOPE: CPT

## 2018-07-12 PROCEDURE — 70450 CT HEAD/BRAIN W/O DYE: CPT

## 2018-07-12 PROCEDURE — 86381 MITOCHONDRIAL ANTIBODY EACH: CPT

## 2018-07-12 PROCEDURE — 80074 ACUTE HEPATITIS PANEL: CPT

## 2018-07-12 PROCEDURE — 83550 IRON BINDING TEST: CPT

## 2018-07-12 PROCEDURE — 80048 BASIC METABOLIC PNL TOTAL CA: CPT

## 2018-07-12 PROCEDURE — 82105 ALPHA-FETOPROTEIN SERUM: CPT

## 2018-07-12 PROCEDURE — 93005 ELECTROCARDIOGRAM TRACING: CPT

## 2018-07-12 PROCEDURE — 81332 SERPINA1 GENE: CPT

## 2018-07-12 PROCEDURE — 85610 PROTHROMBIN TIME: CPT

## 2018-07-12 PROCEDURE — 87086 URINE CULTURE/COLONY COUNT: CPT

## 2018-07-12 PROCEDURE — 80076 HEPATIC FUNCTION PANEL: CPT

## 2018-07-12 PROCEDURE — 86255 FLUORESCENT ANTIBODY SCREEN: CPT

## 2018-07-12 PROCEDURE — 71045 X-RAY EXAM CHEST 1 VIEW: CPT

## 2018-07-12 PROCEDURE — 86038 ANTINUCLEAR ANTIBODIES: CPT

## 2018-07-12 PROCEDURE — 82962 GLUCOSE BLOOD TEST: CPT

## 2018-07-12 RX ORDER — LACTULOSE 10 G/15ML
45 SOLUTION ORAL
Qty: 1400 | Refills: 0 | OUTPATIENT
Start: 2018-07-12 | End: 2018-08-10

## 2018-07-12 RX ADMIN — LOSARTAN POTASSIUM 100 MILLIGRAM(S): 100 TABLET, FILM COATED ORAL at 05:46

## 2018-07-12 RX ADMIN — PANTOPRAZOLE SODIUM 40 MILLIGRAM(S): 20 TABLET, DELAYED RELEASE ORAL at 05:46

## 2018-07-12 RX ADMIN — Medication 5 UNIT(S): at 08:37

## 2018-07-12 RX ADMIN — LACTULOSE 30 GRAM(S): 10 SOLUTION ORAL at 05:45

## 2018-07-12 RX ADMIN — URSODIOL 300 MILLIGRAM(S): 250 TABLET, FILM COATED ORAL at 05:46

## 2018-07-12 RX ADMIN — METFORMIN HYDROCHLORIDE 1000 MILLIGRAM(S): 850 TABLET ORAL at 06:10

## 2018-07-12 RX ADMIN — GABAPENTIN 800 MILLIGRAM(S): 400 CAPSULE ORAL at 05:46

## 2018-07-12 NOTE — PROGRESS NOTE ADULT - SUBJECTIVE AND OBJECTIVE BOX
INTERVAL HPI/OVERNIGHT EVENTS:  pt seen and examined  denies gi complaints, +bm yesterday, states hes being discharged  per rn, refused mri yesterday, no acute gi issues overnight  afebrile overnight    MEDICATIONS  (STANDING):  aspirin enteric coated 81 milliGRAM(s) Oral daily  dextrose 5%. 1000 milliLiter(s) (50 mL/Hr) IV Continuous <Continuous>  dextrose 50% Injectable 12.5 Gram(s) IV Push once  dextrose 50% Injectable 25 Gram(s) IV Push once  dextrose 50% Injectable 25 Gram(s) IV Push once  gabapentin 800 milliGRAM(s) Oral four times a day  insulin glargine Injectable (LANTUS) 80 Unit(s) SubCutaneous at bedtime  insulin lispro (HumaLOG) corrective regimen sliding scale   SubCutaneous three times a day before meals  insulin lispro Injectable (HumaLOG) 5 Unit(s) SubCutaneous three times a day before meals  lactulose Syrup 30 Gram(s) Oral three times a day  losartan 100 milliGRAM(s) Oral daily  metFORMIN 1000 milliGRAM(s) Oral two times a day  pantoprazole    Tablet 40 milliGRAM(s) Oral before breakfast  rifaximin 550 milliGRAM(s) Oral two times a day  simvastatin 40 milliGRAM(s) Oral at bedtime  ursodiol Capsule 300 milliGRAM(s) Oral two times a day    MEDICATIONS  (PRN):  dextrose 40% Gel 15 Gram(s) Oral once PRN Blood Glucose LESS THAN 70 milliGRAM(s)/deciliter  glucagon  Injectable 1 milliGRAM(s) IntraMuscular once PRN Glucose LESS THAN 70 milligrams/deciliter      Allergies    codeine (Anaphylaxis)    Intolerances        Review of Systems:    General:  No wt loss, fevers, chills, night sweats, fatigue   Eyes:  Good vision, no reported pain  ENT:  No sore throat, pain, runny nose, dysphagia  CV:  No pain, palpitations, hypo/hypertension  Resp:  No dyspnea, cough, tachypnea, wheezing  GI:  No pain, No nausea, No vomiting, No diarrhea, No constipation, No weight loss, No fever, No pruritis, No rectal bleeding, No melena, No dysphagia  :  No pain, bleeding, incontinence, nocturia  Muscle:  No pain, weakness  Neuro:  No weakness, tingling, memory problems  Psych:  No fatigue, insomnia, mood problems, depression  Endocrine:  No polyuria, polydypsia, cold/heat intolerance  Heme:  No petechiae, ecchymosis, easy bruisability  Skin:  No rash, tattoos, scars, edema      Vital Signs Last 24 Hrs  T(C): 36.7 (2018 05:44), Max: 36.7 (2018 22:06)  T(F): 98.1 (2018 05:44), Max: 98.1 (2018 22:06)  HR: 86 (2018 05:44) (86 - 90)  BP: 117/65 (2018 05:44) (97/56 - 117/65)  BP(mean): --  RR: 18 (2018 05:44) (17 - 18)  SpO2: 95% (2018 05:44) (94% - 96%)    PHYSICAL EXAM:  Constitutional: NAD, oob in chair  HEENT: EOMI, perrl  Neck: No LAD  Respiratory: CTA   Cardiovascular: S1 and S2, RR  Gastrointestinal: obese abd, BS+, soft, NT, nd  Extremities: trace edema  Vascular: 2+ peripheral pulses  Neurological: a&0 x3  Skin: No rashes    LABS:                        12.0   3.73  )-----------( 90       ( 2018 07:02 )             35.3     07-12    141  |  107  |  16  ----------------------------<  102<H>  4.1   |  25  |  1.10    Ca    9.1      2018 07:02    TPro  7.1  /  Alb  3.0<L>  /  TBili  2.9<H>  /  DBili  .80<H>  /  AST  48<H>  /  ALT  29  /  AlkPhos  135<H>  07-12    PT/INR - ( 2018 07:02 )   PT: 13.2 sec;   INR: 1.21 ratio           Urinalysis Basic - ( 10 Jul 2018 12:19 )    Color: Yellow / Appearance: Clear / S.010 / pH: x  Gluc: x / Ketone: Negative  / Bili: Negative / Urobili: 1   Blood: x / Protein: Negative / Nitrite: Negative   Leuk Esterase: Negative / RBC: x / WBC x   Sq Epi: x / Non Sq Epi: x / Bacteria: x        RADIOLOGY & ADDITIONAL TESTS:

## 2018-07-12 NOTE — DISCHARGE NOTE ADULT - CARE PROVIDERS DIRECT ADDRESSES
,DirectAddress_Unknown,[email or web address]

## 2018-07-12 NOTE — DISCHARGE NOTE ADULT - NS AS ACTIVITY OBS
Return to Work/School allowed/Showering allowed/Driving allowed/Walking-Outdoors allowed/Bathing allowed/No Heavy lifting/straining/Stairs allowed/Sex allowed

## 2018-07-12 NOTE — PROGRESS NOTE ADULT - PROBLEM SELECTOR PLAN 1
pt with hx of fatty liver  US 2/2018 noted, with liver lab w/u at that time, neg with exception of +asca  HE improving, ammonia level downtrending  planned for mri abd with liver protocol for further eval but pt refused per rn  pt to be discharged per primary team  needs close outpatient gi f/u upon dc, dw pt and agreeable  cont lactulose, xifaxin, ppi, sandra  low protein/fat diet

## 2018-07-12 NOTE — DISCHARGE NOTE ADULT - MEDICATION SUMMARY - MEDICATIONS TO STOP TAKING
I will STOP taking the medications listed below when I get home from the hospital:    lactulose 10 g/15 mL oral syrup  -- 30 milliliter(s) by mouth 3 times a day

## 2018-07-12 NOTE — DISCHARGE NOTE ADULT - CARE PROVIDER_API CALL
Javy Morales (), Gastroenterology; Internal Medicine  [address] [place] [address]  Phone: [phone]  Fax: [phone]    Ramón Franks), Gastroenterology  [address] [place] [address]  Phone: [phone]  Fax: [phone]

## 2018-07-12 NOTE — DISCHARGE NOTE ADULT - PLAN OF CARE
have stable mental status fu with gi as outpt  take medications as prescribed outpt open mri  fu with gi take medications as prescribed

## 2018-07-12 NOTE — DISCHARGE NOTE ADULT - PATIENT PORTAL LINK FT
You can access the [facility], offered by [facility], by registering with the following website: [email or web address]

## 2018-07-12 NOTE — DISCHARGE NOTE ADULT - CARE PLAN
Principal Discharge DX:	Change in mental status  Goal:	have stable mental status  Assessment and plan of treatment:	fu with gi as outpt  take medications as prescribed  Secondary Diagnosis:	Fatty liver disease, nonalcoholic  Assessment and plan of treatment:	outpt open mri  fu with gi  Secondary Diagnosis:	Hepatic encephalopathy  Assessment and plan of treatment:	take medications as prescribed

## 2018-07-12 NOTE — DISCHARGE NOTE ADULT - MEDICATION SUMMARY - MEDICATIONS TO TAKE
I will START or STAY ON the medications listed below when I get home from the hospital:    Aspir 81 oral delayed release tablet  -- 1 tab(s) by mouth once a day  -- Indication: For --    irbesartan 300 mg oral tablet  -- 1 tab(s) by mouth once a day  -- Indication: For --    gabapentin 800 mg oral tablet  -- orally 4 times a day  -- Indication: For --    metFORMIN 1000 mg oral tablet  -- 1 tab(s) by mouth 2 times a day  -- Indication: For --    Lantus 100 units/mL subcutaneous solution  -- 80  subcutaneous once a day (at bedtime)  -- Indication: For --    HumaLOG 100 units/mL subcutaneous solution  -- 30 unit(s) subcutaneous 3 times a day  -- Indication: For --    pioglitazone 30 mg oral tablet  -- 1 tab(s) by mouth once a day  -- Indication: For --    pravastatin 40 mg oral tablet  -- 1 tab(s) by mouth once a day  -- Indication: For --    ursodiol 300 mg oral capsule  -- 1 cap(s) by mouth 2 times a day  -- Indication: For --    lactulose 10 g/15 mL oral syrup  -- 45 milliliter(s) by mouth once a day   -- Indication: For --    rifAXIMin 550 mg oral tablet  -- 1 tab(s) by mouth 2 times a day  -- Indication: For --    omeprazole 20 mg oral delayed release capsule  -- 1 cap(s) by mouth once a day  -- Indication: For --

## 2018-07-12 NOTE — DISCHARGE NOTE ADULT - HOSPITAL COURSE
62 y male accompanied to ed by son, who states patient has been "confused" x 2 days, no fever no nvd, no recent illness,  patient oriented x 1 states he has been feeling "tired",   as per nurses note, and son states Dr montalvo advised patient come to ed for evaluation today, has hx of elevated ammonia level, hx of hepatic encephalopathy, admitted Feb 2018 for ammonia level 134.  patient denies pain.  nonsmoker.  no injury, or trauma.  states he did not eat anything today.  In er pt confused and wife at bedside and states this happened last time. pt with ams due to hepatic encephalopathy due to fatty liver disease, seen by his gi, meds adjusted, lactulose increased, mental status back to baseline, unable to have closed mri, will fu with gi in office and have open mri as outpt.  >30 minutes spent on discharge

## 2018-07-13 LAB
ANA PAT FLD IF-IMP: ABNORMAL
ANA TITR SER: ABNORMAL
FERRITIN SERPL-MCNC: 57 NG/ML — SIGNIFICANT CHANGE UP (ref 30–400)
IRON SATN MFR SERPL: 258 UG/DL — HIGH (ref 45–165)
IRON SATN MFR SERPL: 56 % — HIGH (ref 16–55)
LKM AB SER-ACNC: 1.4 UNITS — SIGNIFICANT CHANGE UP (ref 0–20)
TIBC SERPL-MCNC: 457 UG/DL — HIGH (ref 220–430)
UIBC SERPL-MCNC: 199 UG/DL — SIGNIFICANT CHANGE UP (ref 110–370)

## 2018-07-17 PROBLEM — N20.0 CALCULUS OF KIDNEY: Chronic | Status: ACTIVE | Noted: 2018-01-23

## 2018-07-29 ENCOUNTER — EMERGENCY (EMERGENCY)
Facility: HOSPITAL | Age: 63
LOS: 1 days | Discharge: HOME | End: 2018-07-29
Attending: EMERGENCY MEDICINE
Payer: MEDICARE

## 2018-07-29 VITALS
RESPIRATION RATE: 16 BRPM | TEMPERATURE: 99 F | DIASTOLIC BLOOD PRESSURE: 61 MMHG | SYSTOLIC BLOOD PRESSURE: 136 MMHG | OXYGEN SATURATION: 99 % | HEART RATE: 79 BPM

## 2018-07-29 VITALS
HEIGHT: 72 IN | RESPIRATION RATE: 16 BRPM | TEMPERATURE: 98 F | HEART RATE: 97 BPM | WEIGHT: 315 LBS | OXYGEN SATURATION: 98 % | SYSTOLIC BLOOD PRESSURE: 130 MMHG | DIASTOLIC BLOOD PRESSURE: 58 MMHG

## 2018-07-29 DIAGNOSIS — Z90.49 ACQUIRED ABSENCE OF OTHER SPECIFIED PARTS OF DIGESTIVE TRACT: Chronic | ICD-10-CM

## 2018-07-29 LAB
ALBUMIN SERPL ELPH-MCNC: 3 G/DL — LOW (ref 3.3–5)
ALP SERPL-CCNC: 251 U/L — HIGH (ref 40–120)
ALT FLD-CCNC: 20 U/L — SIGNIFICANT CHANGE UP (ref 12–78)
ANION GAP SERPL CALC-SCNC: 6 MMOL/L — SIGNIFICANT CHANGE UP (ref 5–17)
AST SERPL-CCNC: 26 U/L — SIGNIFICANT CHANGE UP (ref 15–37)
BASOPHILS # BLD AUTO: 0.05 K/UL — SIGNIFICANT CHANGE UP (ref 0–0.2)
BASOPHILS NFR BLD AUTO: 0.9 % — SIGNIFICANT CHANGE UP (ref 0–2)
BILIRUB SERPL-MCNC: 2.6 MG/DL — HIGH (ref 0.2–1.2)
BUN SERPL-MCNC: 14 MG/DL — SIGNIFICANT CHANGE UP (ref 7–23)
CALCIUM SERPL-MCNC: 8.7 MG/DL — SIGNIFICANT CHANGE UP (ref 8.5–10.1)
CHLORIDE SERPL-SCNC: 105 MMOL/L — SIGNIFICANT CHANGE UP (ref 96–108)
CO2 SERPL-SCNC: 26 MMOL/L — SIGNIFICANT CHANGE UP (ref 22–31)
CREAT SERPL-MCNC: 1.1 MG/DL — SIGNIFICANT CHANGE UP (ref 0.5–1.3)
EOSINOPHIL # BLD AUTO: 0.16 K/UL — SIGNIFICANT CHANGE UP (ref 0–0.5)
EOSINOPHIL NFR BLD AUTO: 2.8 % — SIGNIFICANT CHANGE UP (ref 0–6)
GLUCOSE SERPL-MCNC: 226 MG/DL — HIGH (ref 70–99)
HCT VFR BLD CALC: 33.3 % — LOW (ref 39–50)
HGB BLD-MCNC: 11.4 G/DL — LOW (ref 13–17)
IMM GRANULOCYTES NFR BLD AUTO: 0.3 % — SIGNIFICANT CHANGE UP (ref 0–1.5)
LACTATE SERPL-SCNC: 2.9 MMOL/L — HIGH (ref 0.7–2)
LACTATE SERPL-SCNC: 2.9 MMOL/L — HIGH (ref 0.7–2)
LYMPHOCYTES # BLD AUTO: 1.76 K/UL — SIGNIFICANT CHANGE UP (ref 1–3.3)
LYMPHOCYTES # BLD AUTO: 30.7 % — SIGNIFICANT CHANGE UP (ref 13–44)
MCHC RBC-ENTMCNC: 32.9 PG — SIGNIFICANT CHANGE UP (ref 27–34)
MCHC RBC-ENTMCNC: 34.2 GM/DL — SIGNIFICANT CHANGE UP (ref 32–36)
MCV RBC AUTO: 96 FL — SIGNIFICANT CHANGE UP (ref 80–100)
MONOCYTES # BLD AUTO: 0.85 K/UL — SIGNIFICANT CHANGE UP (ref 0–0.9)
MONOCYTES NFR BLD AUTO: 14.8 % — HIGH (ref 2–14)
NEUTROPHILS # BLD AUTO: 2.9 K/UL — SIGNIFICANT CHANGE UP (ref 1.8–7.4)
NEUTROPHILS NFR BLD AUTO: 50.5 % — SIGNIFICANT CHANGE UP (ref 43–77)
NRBC # BLD: 0 /100 WBCS — SIGNIFICANT CHANGE UP (ref 0–0)
PLATELET # BLD AUTO: 101 K/UL — LOW (ref 150–400)
POTASSIUM SERPL-MCNC: 4.6 MMOL/L — SIGNIFICANT CHANGE UP (ref 3.5–5.3)
POTASSIUM SERPL-SCNC: 4.6 MMOL/L — SIGNIFICANT CHANGE UP (ref 3.5–5.3)
PROT SERPL-MCNC: 6.8 G/DL — SIGNIFICANT CHANGE UP (ref 6–8.3)
RBC # BLD: 3.47 M/UL — LOW (ref 4.2–5.8)
RBC # FLD: 15.6 % — HIGH (ref 10.3–14.5)
SODIUM SERPL-SCNC: 137 MMOL/L — SIGNIFICANT CHANGE UP (ref 135–145)
WBC # BLD: 5.74 K/UL — SIGNIFICANT CHANGE UP (ref 3.8–10.5)
WBC # FLD AUTO: 5.74 K/UL — SIGNIFICANT CHANGE UP (ref 3.8–10.5)

## 2018-07-29 PROCEDURE — 99285 EMERGENCY DEPT VISIT HI MDM: CPT

## 2018-07-29 PROCEDURE — 73080 X-RAY EXAM OF ELBOW: CPT

## 2018-07-29 PROCEDURE — 96375 TX/PRO/DX INJ NEW DRUG ADDON: CPT

## 2018-07-29 PROCEDURE — 73080 X-RAY EXAM OF ELBOW: CPT | Mod: 26,RT

## 2018-07-29 PROCEDURE — 85027 COMPLETE CBC AUTOMATED: CPT

## 2018-07-29 PROCEDURE — 96374 THER/PROPH/DIAG INJ IV PUSH: CPT

## 2018-07-29 PROCEDURE — 83605 ASSAY OF LACTIC ACID: CPT

## 2018-07-29 PROCEDURE — 36415 COLL VENOUS BLD VENIPUNCTURE: CPT

## 2018-07-29 PROCEDURE — 80053 COMPREHEN METABOLIC PANEL: CPT

## 2018-07-29 PROCEDURE — 99284 EMERGENCY DEPT VISIT MOD MDM: CPT | Mod: 25

## 2018-07-29 RX ORDER — KETOROLAC TROMETHAMINE 30 MG/ML
15 SYRINGE (ML) INJECTION ONCE
Qty: 0 | Refills: 0 | Status: DISCONTINUED | OUTPATIENT
Start: 2018-07-29 | End: 2018-07-29

## 2018-07-29 RX ORDER — SODIUM CHLORIDE 9 MG/ML
1000 INJECTION INTRAMUSCULAR; INTRAVENOUS; SUBCUTANEOUS ONCE
Qty: 0 | Refills: 0 | Status: COMPLETED | OUTPATIENT
Start: 2018-07-29 | End: 2018-07-29

## 2018-07-29 RX ADMIN — SODIUM CHLORIDE 1000 MILLILITER(S): 9 INJECTION INTRAMUSCULAR; INTRAVENOUS; SUBCUTANEOUS at 18:15

## 2018-07-29 RX ADMIN — Medication 100 MILLIGRAM(S): at 18:20

## 2018-07-29 RX ADMIN — Medication 15 MILLIGRAM(S): at 19:58

## 2018-07-29 RX ADMIN — SODIUM CHLORIDE 1000 MILLILITER(S): 9 INJECTION INTRAMUSCULAR; INTRAVENOUS; SUBCUTANEOUS at 19:24

## 2018-07-29 RX ADMIN — Medication 15 MILLIGRAM(S): at 18:19

## 2018-07-29 NOTE — ED PROVIDER NOTE - OBJECTIVE STATEMENT
62 male presents to ER c/o right elbow pain and redness. Patient states he felt he was bit by a bug on Monday, developed redness and noted pimple like mass on right elbow, went to PMD on Friday, states the pimple was squeezed and fluid was expressed out, placed on Augmentin 875mg, started it yesterday. Patient states today he is having continued pain to right elbow and came to ER.

## 2018-07-29 NOTE — ED PROVIDER NOTE - MEDICAL DECISION MAKING DETAILS
62 male with right elbow cellulitis, has good range of motion of elbow joint, no signs of septic joint, f/u labs, pain control, antibiotics

## 2018-07-29 NOTE — ED ADULT NURSE REASSESSMENT NOTE - COMFORT CARE
po fluids offered/ambulated to bathroom/repositioned/plan of care explained/wait time explained/side rails down

## 2018-07-29 NOTE — ED ADULT NURSE NOTE - CHIEF COMPLAINT QUOTE
"I got bit by a bug right below my right elbow on 7/24 or 7/25. It looked like a pimple at first. I went to my PCP on 7/27, he squeezed it & put me on Augmentin which I started yesterday & took 3 doses of so far. The pain is worse today"

## 2018-07-29 NOTE — ED ADULT NURSE NOTE - OBJECTIVE STATEMENT
received pt in bed #18b Pt A&O states he got bit on his right elbow on Thursday Dr Coto on friday had it drained & was placed on Augmentin, which he started yesterday Pt states pain has increased. Right arm is warm , red, & tender to touch Pt seen by Dr Montalvo Will monitor

## 2018-07-29 NOTE — ED PROVIDER NOTE - PROGRESS NOTE DETAILS
patient feeling better, not in pain, no fever, white count not elevated, wants to go home, agrees to stop augmentin and start clinda, dontrell f/u with his PMD, understands to reurn to ER if having fever, increased redness, pain, or worsening of symptoms

## 2018-07-30 PROBLEM — E66.9 OBESITY, UNSPECIFIED: Chronic | Status: ACTIVE | Noted: 2018-07-10

## 2018-07-30 PROBLEM — K76.0 FATTY (CHANGE OF) LIVER, NOT ELSEWHERE CLASSIFIED: Chronic | Status: ACTIVE | Noted: 2018-07-10

## 2018-07-30 PROBLEM — I10 ESSENTIAL (PRIMARY) HYPERTENSION: Chronic | Status: ACTIVE | Noted: 2018-01-23

## 2018-07-30 PROBLEM — E11.9 TYPE 2 DIABETES MELLITUS WITHOUT COMPLICATIONS: Chronic | Status: ACTIVE | Noted: 2017-09-27

## 2018-10-17 ENCOUNTER — INPATIENT (INPATIENT)
Facility: HOSPITAL | Age: 63
LOS: 5 days | Discharge: HOME | DRG: 384 | End: 2018-10-23
Attending: INTERNAL MEDICINE | Admitting: INTERNAL MEDICINE
Payer: MEDICARE

## 2018-10-17 ENCOUNTER — TRANSCRIPTION ENCOUNTER (OUTPATIENT)
Age: 63
End: 2018-10-17

## 2018-10-17 VITALS
SYSTOLIC BLOOD PRESSURE: 130 MMHG | WEIGHT: 307.99 LBS | OXYGEN SATURATION: 99 % | DIASTOLIC BLOOD PRESSURE: 77 MMHG | RESPIRATION RATE: 17 BRPM | HEART RATE: 98 BPM | TEMPERATURE: 98 F

## 2018-10-17 DIAGNOSIS — G62.9 POLYNEUROPATHY, UNSPECIFIED: ICD-10-CM

## 2018-10-17 DIAGNOSIS — E66.9 OBESITY, UNSPECIFIED: ICD-10-CM

## 2018-10-17 DIAGNOSIS — N20.0 CALCULUS OF KIDNEY: ICD-10-CM

## 2018-10-17 DIAGNOSIS — Z90.49 ACQUIRED ABSENCE OF OTHER SPECIFIED PARTS OF DIGESTIVE TRACT: Chronic | ICD-10-CM

## 2018-10-17 DIAGNOSIS — R10.9 UNSPECIFIED ABDOMINAL PAIN: ICD-10-CM

## 2018-10-17 DIAGNOSIS — K72.90 HEPATIC FAILURE, UNSPECIFIED WITHOUT COMA: ICD-10-CM

## 2018-10-17 DIAGNOSIS — E78.00 PURE HYPERCHOLESTEROLEMIA, UNSPECIFIED: ICD-10-CM

## 2018-10-17 DIAGNOSIS — E11.9 TYPE 2 DIABETES MELLITUS WITHOUT COMPLICATIONS: ICD-10-CM

## 2018-10-17 DIAGNOSIS — I10 ESSENTIAL (PRIMARY) HYPERTENSION: ICD-10-CM

## 2018-10-17 DIAGNOSIS — Z29.9 ENCOUNTER FOR PROPHYLACTIC MEASURES, UNSPECIFIED: ICD-10-CM

## 2018-10-17 DIAGNOSIS — K76.0 FATTY (CHANGE OF) LIVER, NOT ELSEWHERE CLASSIFIED: ICD-10-CM

## 2018-10-17 DIAGNOSIS — K74.5 BILIARY CIRRHOSIS, UNSPECIFIED: ICD-10-CM

## 2018-10-17 LAB
ALBUMIN SERPL ELPH-MCNC: 2.1 G/DL — LOW (ref 3.3–5)
ALP SERPL-CCNC: 165 U/L — HIGH (ref 40–120)
ALT FLD-CCNC: 22 U/L — SIGNIFICANT CHANGE UP (ref 12–78)
AMMONIA BLD-MCNC: 73 UMOL/L — HIGH (ref 11–32)
AMYLASE P1 CFR SERPL: 20 U/L — LOW (ref 25–115)
ANION GAP SERPL CALC-SCNC: 8 MMOL/L — SIGNIFICANT CHANGE UP (ref 5–17)
APTT BLD: 36.7 SEC — SIGNIFICANT CHANGE UP (ref 27.5–37.4)
AST SERPL-CCNC: 29 U/L — SIGNIFICANT CHANGE UP (ref 15–37)
BASOPHILS # BLD AUTO: 0.05 K/UL — SIGNIFICANT CHANGE UP (ref 0–0.2)
BASOPHILS NFR BLD AUTO: 0.8 % — SIGNIFICANT CHANGE UP (ref 0–2)
BILIRUB SERPL-MCNC: 4 MG/DL — HIGH (ref 0.2–1.2)
BUN SERPL-MCNC: 13 MG/DL — SIGNIFICANT CHANGE UP (ref 7–23)
CALCIUM SERPL-MCNC: 7.5 MG/DL — LOW (ref 8.5–10.1)
CHLORIDE SERPL-SCNC: 112 MMOL/L — HIGH (ref 96–108)
CO2 SERPL-SCNC: 21 MMOL/L — LOW (ref 22–31)
CREAT SERPL-MCNC: 0.71 MG/DL — SIGNIFICANT CHANGE UP (ref 0.5–1.3)
EOSINOPHIL # BLD AUTO: 0.03 K/UL — SIGNIFICANT CHANGE UP (ref 0–0.5)
EOSINOPHIL NFR BLD AUTO: 0.5 % — SIGNIFICANT CHANGE UP (ref 0–6)
GLUCOSE SERPL-MCNC: 189 MG/DL — HIGH (ref 70–99)
HCT VFR BLD CALC: 42.2 % — SIGNIFICANT CHANGE UP (ref 39–50)
HGB BLD-MCNC: 14.5 G/DL — SIGNIFICANT CHANGE UP (ref 13–17)
IMM GRANULOCYTES NFR BLD AUTO: 0.3 % — SIGNIFICANT CHANGE UP (ref 0–1.5)
INR BLD: 1.49 RATIO — HIGH (ref 0.88–1.16)
LACTATE SERPL-SCNC: 3.4 MMOL/L — HIGH (ref 0.7–2)
LACTATE SERPL-SCNC: 4.1 MMOL/L — CRITICAL HIGH (ref 0.7–2)
LIDOCAIN IGE QN: 54 U/L — LOW (ref 73–393)
LYMPHOCYTES # BLD AUTO: 1.31 K/UL — SIGNIFICANT CHANGE UP (ref 1–3.3)
LYMPHOCYTES # BLD AUTO: 22.1 % — SIGNIFICANT CHANGE UP (ref 13–44)
MCHC RBC-ENTMCNC: 33.6 PG — SIGNIFICANT CHANGE UP (ref 27–34)
MCHC RBC-ENTMCNC: 34.4 GM/DL — SIGNIFICANT CHANGE UP (ref 32–36)
MCV RBC AUTO: 97.9 FL — SIGNIFICANT CHANGE UP (ref 80–100)
MONOCYTES # BLD AUTO: 0.51 K/UL — SIGNIFICANT CHANGE UP (ref 0–0.9)
MONOCYTES NFR BLD AUTO: 8.6 % — SIGNIFICANT CHANGE UP (ref 2–14)
NEUTROPHILS # BLD AUTO: 4.02 K/UL — SIGNIFICANT CHANGE UP (ref 1.8–7.4)
NEUTROPHILS NFR BLD AUTO: 67.7 % — SIGNIFICANT CHANGE UP (ref 43–77)
PLATELET # BLD AUTO: 108 K/UL — LOW (ref 150–400)
POTASSIUM SERPL-MCNC: 3.7 MMOL/L — SIGNIFICANT CHANGE UP (ref 3.5–5.3)
POTASSIUM SERPL-SCNC: 3.7 MMOL/L — SIGNIFICANT CHANGE UP (ref 3.5–5.3)
PROT SERPL-MCNC: 5.5 G/DL — LOW (ref 6–8.3)
PROTHROM AB SERPL-ACNC: 16.4 SEC — HIGH (ref 9.8–12.7)
RBC # BLD: 4.31 M/UL — SIGNIFICANT CHANGE UP (ref 4.2–5.8)
RBC # FLD: 14.2 % — SIGNIFICANT CHANGE UP (ref 10.3–14.5)
SODIUM SERPL-SCNC: 141 MMOL/L — SIGNIFICANT CHANGE UP (ref 135–145)
WBC # BLD: 5.94 K/UL — SIGNIFICANT CHANGE UP (ref 3.8–10.5)
WBC # FLD AUTO: 5.94 K/UL — SIGNIFICANT CHANGE UP (ref 3.8–10.5)

## 2018-10-17 PROCEDURE — 71045 X-RAY EXAM CHEST 1 VIEW: CPT | Mod: 26

## 2018-10-17 PROCEDURE — 93010 ELECTROCARDIOGRAM REPORT: CPT

## 2018-10-17 PROCEDURE — 74177 CT ABD & PELVIS W/CONTRAST: CPT | Mod: 26

## 2018-10-17 PROCEDURE — 99285 EMERGENCY DEPT VISIT HI MDM: CPT

## 2018-10-17 RX ORDER — INSULIN GLARGINE 100 [IU]/ML
80 INJECTION, SOLUTION SUBCUTANEOUS
Qty: 0 | Refills: 0 | COMMUNITY

## 2018-10-17 RX ORDER — ONDANSETRON 8 MG/1
4 TABLET, FILM COATED ORAL ONCE
Qty: 0 | Refills: 0 | Status: COMPLETED | OUTPATIENT
Start: 2018-10-17 | End: 2018-10-17

## 2018-10-17 RX ORDER — GLUCAGON INJECTION, SOLUTION 0.5 MG/.1ML
1 INJECTION, SOLUTION SUBCUTANEOUS ONCE
Qty: 0 | Refills: 0 | Status: DISCONTINUED | OUTPATIENT
Start: 2018-10-17 | End: 2018-10-23

## 2018-10-17 RX ORDER — DEXTROSE 50 % IN WATER 50 %
12.5 SYRINGE (ML) INTRAVENOUS ONCE
Qty: 0 | Refills: 0 | Status: DISCONTINUED | OUTPATIENT
Start: 2018-10-17 | End: 2018-10-23

## 2018-10-17 RX ORDER — INSULIN LISPRO 100/ML
30 VIAL (ML) SUBCUTANEOUS
Qty: 0 | Refills: 0 | COMMUNITY

## 2018-10-17 RX ORDER — DEXTROSE 50 % IN WATER 50 %
25 SYRINGE (ML) INTRAVENOUS ONCE
Qty: 0 | Refills: 0 | Status: DISCONTINUED | OUTPATIENT
Start: 2018-10-17 | End: 2018-10-23

## 2018-10-17 RX ORDER — LACTULOSE 10 G/15ML
30 SOLUTION ORAL DAILY
Qty: 0 | Refills: 0 | Status: DISCONTINUED | OUTPATIENT
Start: 2018-10-17 | End: 2018-10-18

## 2018-10-17 RX ORDER — SODIUM CHLORIDE 9 MG/ML
1000 INJECTION, SOLUTION INTRAVENOUS
Qty: 0 | Refills: 0 | Status: DISCONTINUED | OUTPATIENT
Start: 2018-10-17 | End: 2018-10-23

## 2018-10-17 RX ORDER — FUROSEMIDE 40 MG
20 TABLET ORAL DAILY
Qty: 0 | Refills: 0 | Status: DISCONTINUED | OUTPATIENT
Start: 2018-10-17 | End: 2018-10-17

## 2018-10-17 RX ORDER — GABAPENTIN 400 MG/1
800 CAPSULE ORAL
Qty: 0 | Refills: 0 | Status: DISCONTINUED | OUTPATIENT
Start: 2018-10-17 | End: 2018-10-23

## 2018-10-17 RX ORDER — LOSARTAN POTASSIUM 100 MG/1
100 TABLET, FILM COATED ORAL DAILY
Qty: 0 | Refills: 0 | Status: DISCONTINUED | OUTPATIENT
Start: 2018-10-17 | End: 2018-10-23

## 2018-10-17 RX ORDER — SODIUM CHLORIDE 9 MG/ML
1000 INJECTION INTRAMUSCULAR; INTRAVENOUS; SUBCUTANEOUS ONCE
Qty: 0 | Refills: 0 | Status: COMPLETED | OUTPATIENT
Start: 2018-10-17 | End: 2018-10-17

## 2018-10-17 RX ORDER — ASPIRIN/CALCIUM CARB/MAGNESIUM 324 MG
81 TABLET ORAL DAILY
Qty: 0 | Refills: 0 | Status: DISCONTINUED | OUTPATIENT
Start: 2018-10-17 | End: 2018-10-17

## 2018-10-17 RX ORDER — HYDROMORPHONE HYDROCHLORIDE 2 MG/ML
2 INJECTION INTRAMUSCULAR; INTRAVENOUS; SUBCUTANEOUS THREE TIMES A DAY
Qty: 0 | Refills: 0 | Status: DISCONTINUED | OUTPATIENT
Start: 2018-10-17 | End: 2018-10-17

## 2018-10-17 RX ORDER — SODIUM CHLORIDE 9 MG/ML
1000 INJECTION INTRAMUSCULAR; INTRAVENOUS; SUBCUTANEOUS
Qty: 0 | Refills: 0 | Status: DISCONTINUED | OUTPATIENT
Start: 2018-10-17 | End: 2018-10-20

## 2018-10-17 RX ORDER — HYDROMORPHONE HYDROCHLORIDE 2 MG/ML
1 INJECTION INTRAMUSCULAR; INTRAVENOUS; SUBCUTANEOUS THREE TIMES A DAY
Qty: 0 | Refills: 0 | Status: DISCONTINUED | OUTPATIENT
Start: 2018-10-17 | End: 2018-10-18

## 2018-10-17 RX ORDER — DEXTROSE 50 % IN WATER 50 %
15 SYRINGE (ML) INTRAVENOUS ONCE
Qty: 0 | Refills: 0 | Status: DISCONTINUED | OUTPATIENT
Start: 2018-10-17 | End: 2018-10-23

## 2018-10-17 RX ORDER — SUCRALFATE 1 G
1 TABLET ORAL
Qty: 0 | Refills: 0 | Status: DISCONTINUED | OUTPATIENT
Start: 2018-10-17 | End: 2018-10-19

## 2018-10-17 RX ORDER — ONDANSETRON 8 MG/1
4 TABLET, FILM COATED ORAL EVERY 6 HOURS
Qty: 0 | Refills: 0 | Status: DISCONTINUED | OUTPATIENT
Start: 2018-10-17 | End: 2018-10-17

## 2018-10-17 RX ORDER — PANTOPRAZOLE SODIUM 20 MG/1
40 TABLET, DELAYED RELEASE ORAL EVERY 12 HOURS
Qty: 0 | Refills: 0 | Status: DISCONTINUED | OUTPATIENT
Start: 2018-10-17 | End: 2018-10-19

## 2018-10-17 RX ORDER — LACTULOSE 10 G/15ML
30 SOLUTION ORAL ONCE
Qty: 0 | Refills: 0 | Status: COMPLETED | OUTPATIENT
Start: 2018-10-17 | End: 2018-10-17

## 2018-10-17 RX ORDER — URSODIOL 250 MG/1
300 TABLET, FILM COATED ORAL THREE TIMES A DAY
Qty: 0 | Refills: 0 | Status: DISCONTINUED | OUTPATIENT
Start: 2018-10-17 | End: 2018-10-23

## 2018-10-17 RX ORDER — IOHEXOL 300 MG/ML
30 INJECTION, SOLUTION INTRAVENOUS ONCE
Qty: 0 | Refills: 0 | Status: DISCONTINUED | OUTPATIENT
Start: 2018-10-17 | End: 2018-10-17

## 2018-10-17 RX ORDER — URSODIOL 250 MG/1
1 TABLET, FILM COATED ORAL
Qty: 0 | Refills: 0 | COMMUNITY

## 2018-10-17 RX ORDER — ENOXAPARIN SODIUM 100 MG/ML
40 INJECTION SUBCUTANEOUS DAILY
Qty: 0 | Refills: 0 | Status: DISCONTINUED | OUTPATIENT
Start: 2018-10-17 | End: 2018-10-17

## 2018-10-17 RX ORDER — INSULIN GLARGINE 100 [IU]/ML
20 INJECTION, SOLUTION SUBCUTANEOUS AT BEDTIME
Qty: 0 | Refills: 0 | Status: DISCONTINUED | OUTPATIENT
Start: 2018-10-17 | End: 2018-10-18

## 2018-10-17 RX ORDER — INSULIN LISPRO 100/ML
VIAL (ML) SUBCUTANEOUS
Qty: 0 | Refills: 0 | Status: DISCONTINUED | OUTPATIENT
Start: 2018-10-17 | End: 2018-10-22

## 2018-10-17 RX ADMIN — SODIUM CHLORIDE 2000 MILLILITER(S): 9 INJECTION INTRAMUSCULAR; INTRAVENOUS; SUBCUTANEOUS at 21:01

## 2018-10-17 RX ADMIN — ONDANSETRON 4 MILLIGRAM(S): 8 TABLET, FILM COATED ORAL at 17:56

## 2018-10-17 RX ADMIN — Medication 1 GRAM(S): at 18:30

## 2018-10-17 RX ADMIN — ONDANSETRON 4 MILLIGRAM(S): 8 TABLET, FILM COATED ORAL at 15:05

## 2018-10-17 RX ADMIN — URSODIOL 300 MILLIGRAM(S): 250 TABLET, FILM COATED ORAL at 22:41

## 2018-10-17 RX ADMIN — SODIUM CHLORIDE 1000 MILLILITER(S): 9 INJECTION INTRAMUSCULAR; INTRAVENOUS; SUBCUTANEOUS at 15:05

## 2018-10-17 RX ADMIN — LACTULOSE 30 GRAM(S): 10 SOLUTION ORAL at 18:30

## 2018-10-17 RX ADMIN — GABAPENTIN 800 MILLIGRAM(S): 400 CAPSULE ORAL at 23:03

## 2018-10-17 RX ADMIN — HYDROMORPHONE HYDROCHLORIDE 1 MILLIGRAM(S): 2 INJECTION INTRAMUSCULAR; INTRAVENOUS; SUBCUTANEOUS at 18:38

## 2018-10-17 RX ADMIN — SODIUM CHLORIDE 1000 MILLILITER(S): 9 INJECTION INTRAMUSCULAR; INTRAVENOUS; SUBCUTANEOUS at 16:05

## 2018-10-17 RX ADMIN — HYDROMORPHONE HYDROCHLORIDE 1 MILLIGRAM(S): 2 INJECTION INTRAMUSCULAR; INTRAVENOUS; SUBCUTANEOUS at 19:13

## 2018-10-17 RX ADMIN — SODIUM CHLORIDE 75 MILLILITER(S): 9 INJECTION INTRAMUSCULAR; INTRAVENOUS; SUBCUTANEOUS at 22:42

## 2018-10-17 RX ADMIN — LACTULOSE 30 GRAM(S): 10 SOLUTION ORAL at 22:41

## 2018-10-17 RX ADMIN — PANTOPRAZOLE SODIUM 40 MILLIGRAM(S): 20 TABLET, DELAYED RELEASE ORAL at 18:30

## 2018-10-17 RX ADMIN — SODIUM CHLORIDE 1000 MILLILITER(S): 9 INJECTION INTRAMUSCULAR; INTRAVENOUS; SUBCUTANEOUS at 18:30

## 2018-10-17 RX ADMIN — INSULIN GLARGINE 20 UNIT(S): 100 INJECTION, SOLUTION SUBCUTANEOUS at 22:41

## 2018-10-17 NOTE — H&P ADULT - NSHPPHYSICALEXAM_GEN_ALL_CORE
Vital Signs Last 24 Hrs  T(C): 36.8 (17 Oct 2018 18:45), Max: 37.3 (17 Oct 2018 17:09)  T(F): 98.2 (17 Oct 2018 18:45), Max: 99.1 (17 Oct 2018 17:09)  HR: 108 (17 Oct 2018 18:45) (98 - 108)  BP: 134/74 (17 Oct 2018 18:45) (130/77 - 134/74)  RR: 16 (17 Oct 2018 18:45) (16 - 17)  SpO2: 97% (17 Oct 2018 18:45) (97% - 99%)

## 2018-10-17 NOTE — PATIENT PROFILE ADULT - NSPROGENOTHERPROVIDER_GEN_A_NUR
DR Dillan Coto [phone]  Dr Morales [facility] [phone]  Dr Jose Enrique Grace eye [phone]/outpatient care

## 2018-10-17 NOTE — PATIENT PROFILE ADULT - NSPROGENSOURCEINFO_GEN_A_NUR
patient/family/health record/Copy Forward H&P from 10/17/2018,info verified with pt & spouse@ bedside

## 2018-10-17 NOTE — H&P ADULT - PROBLEM SELECTOR PLAN 1
- admit to [facility]  - likely 2/2 PUD vs renal stone, as pain is epigastric in nature. pt states he takes 800mg ibuprofen every other night for neuropathic pain.  - CT Abd/Pelvis showing diffuse bowel wall thickening involving the post bulbar duodenum, descending portion of the duodenum, with periduodenal stranding. Findings may be associated with duodenitis or peptic ulcer disease. No bowel obstruction is noted.  - pt seen by GI (Dr. Morales), plan for EGD in AM  - clear diet, NPO after midnight. Protonix 40mg BID IVPB.  - pt allergic to codeine. Pain control with Dilaudid 1mg q8h PRN. - admit to [facility]  - likely 2/2 PUD vs renal stone, as pain is epigastric in nature. pt states he takes 800mg ibuprofen every other night for neuropathic pain.  - CT Abd/Pelvis showing diffuse bowel wall thickening involving the post bulbar duodenum, descending portion of the duodenum, with periduodenal stranding. Findings may be associated with duodenitis or peptic ulcer disease. No bowel obstruction is noted.  - pt seen by GI (Dr. Morales), plan for EGD in AM  - clear diet, NPO after midnight. Protonix 40mg BID IVPB.  - pt allergic to codeine. Pain control with Dilaudid 1mg q8h PRN.  - ECG showing mild Qtc prolongation. Hold Zofran. Avoid QT prolonging meds. - admit to [facility]  - likely 2/2 PUD , less likely renal stone, as pain is epigastric in nature. pt states he takes 800mg ibuprofen every other night for neuropathic pain.  - CT Abd/Pelvis showing diffuse bowel wall thickening involving the post bulbar duodenum, descending portion of the duodenum, with danilo duodenal stranding. Findings may be associated with duodenitis or peptic ulcer disease. No bowel obstruction is noted.  - pt seen by GI (Dr. Morales), plan for EGD in AM  - clear diet, NPO after midnight. Protonix 40mg BID IVPB. Carafate BID  - pt allergic to codeine. Pain control with Dilaudid 1mg q8h PRN.  - ECG showing mild Qtc prolongation. Hold Zofran. Avoid QT prolonging meds.

## 2018-10-17 NOTE — H&P ADULT - PROBLEM SELECTOR PLAN 2
- pt with hx of renal stones (last stone 25 years ago, passed spontaneously)  - CT Abd/Pelvis showing 4mm nonobstructing calcification in lower pole of L kidney  - pt seen by nephro (Dr. Naqvi), pt states he will f/u as outpt - currently asymptomatic, AAOx3. pt with hx of HE on last admission, likely 2/2 to NAFLD.  - elevated ammonia levels, pt states they are chronically elevated  - continue home lactulose. Start rifaximin 550mg BID as per GI (Dr. Morales) - chronic, stable  - pt follows GI (Dr. Morales) as outpt  - HOLD Lasix for NOW   - continue to monitor

## 2018-10-17 NOTE — H&P ADULT - PROBLEM SELECTOR PLAN 8
- chronic, stable  - continue home irbesartan 300mg qd with hold parameters - chronic, stable  - continue home pravastatin 40mg qd

## 2018-10-17 NOTE — PATIENT PROFILE ADULT - VISION (WITH CORRECTIVE LENSES IF THE PATIENT USUALLY WEARS THEM):
[employer] store eye glasses/Normal vision: sees adequately in most situations; can see medication labels, newsprint

## 2018-10-17 NOTE — ED ADULT NURSE NOTE - OBJECTIVE STATEMENT
Afebrile pt to ED c/o decrease in PO intake, vomiting and abdominal pain x 3 days. Skin intact, sent by GI for r/o pancreatitis, pt vomiting on arrival, blood work drawn & sent to lab. WIll continue to monitor

## 2018-10-17 NOTE — H&P ADULT - NSHPOUTPATIENTPROVIDERS_GEN_ALL_CORE
PMD: Dr. Coto, GI: Dr. Morales PMD: Dr. Coto   GI: Dr. Morales  Endo: Dr. Rios  Pods: Dr. Arroyo  Ophtho: Dr. Sanabria  Heme/Onc: Dr. Rios

## 2018-10-17 NOTE — ED PROVIDER NOTE - ATTENDING CONTRIBUTION TO CARE
63 yo male hx of cholecystectomy c/o epigastric abdominal pain for several days, nonradiating with associated nausea/vomiting.  No fever/chills.  Seen at his GI Dr. Morales office, was trying to go for outpatient bloodwork, but vomited x 2 along the way there.  No chest pain, no shortness of breath.  Admits to losing 60 pounds in past 4 months.  PMD Dr. Dillan Coto    Gen: Alert, NAD, jaundiced  Head/eyes: NC/AT, PERRL, EOMI, normal lids/conjunctiva, + scleral icterus  ENT: Bilateral TM WNL, normal hearing, patent oropharynx without erythema/exudate, uvula midline, no peritonsillar abscess, no tongue/uvula swelling  Neck: supple, no tenderness/meningismus/JVD, Trachea midline  Pulm: Bilateral clear BS, normal resp effort, no wheeze/stridor/retractions  CV: RRR, no M/R/G, +2 dist pulses (radial, pedal DP/PT, popliteal)  Abd: soft, +ttp epigastric/ND, +BS, no guarding/rebound tenderness  Musculoskeletal: no edema/erythema/cyanosis, FROM in all extremities, no C/T/L spine ttp  Skin: +jaundice, no rash, no vesicles, no petechaie, no ecchymosis, no swelling  Neuro: AAOx3, CN 2-12 intact, normal sensation, 5/5 motor strength in all extremities, normal gait, no dysmetria 63 yo male hx of cholecystectomy c/o epigastric abdominal pain for several days, nonradiating with associated nausea/vomiting.  No fever/chills.  Seen at his GI Dr. Morales office, was trying to go for outpatient bloodwork, but vomited x 2 along the way there.  No chest pain, no shortness of breath.  Admits to losing 60 pounds in past 4 months.  PMD Dr. Dillan Coto    Gen: Alert, NAD, jaundiced  Head/eyes: NC/AT, PERRL, EOMI, normal lids/conjunctiva, + scleral icterus  ENT: Bilateral TM WNL, normal hearing, patent oropharynx without erythema/exudate, uvula midline, no peritonsillar abscess, no tongue/uvula swelling  Neck: supple, no tenderness/meningismus/JVD, Trachea midline  Pulm: Bilateral clear BS, normal resp effort, no wheeze/stridor/retractions  CV: RRR, no M/R/G, +2 dist pulses (radial, pedal DP/PT, popliteal)  Abd: soft, +ttp epigastric/ND, +BS, no guarding/rebound tenderness  Musculoskeletal: no edema/erythema/cyanosis, FROM in all extremities, no C/T/L spine ttp  Skin: +jaundice, no rash, no vesicles, no petechaie, no ecchymosis, no swelling  Neuro: AAOx3, CN 2-12 intact, normal sensation, 5/5 motor strength in all extremities, normal gait, no dysmetria    tbili 4.0, bloodwork pending, CT a/p pending, 61 yo male hx of cholecystectomy c/o epigastric abdominal pain for several days, nonradiating with associated nausea/vomiting.  No fever/chills.  Seen at his GI Dr. Morales office, was trying to go for outpatient bloodwork, but vomited x 2 along the way there.  No chest pain, no shortness of breath.  Admits to losing 60 pounds in past 4 months.  PMD Dr. Dillan Coto    Gen: Alert, NAD, jaundiced  Head/eyes: NC/AT, PERRL, EOMI, normal lids/conjunctiva, + scleral icterus  ENT: Bilateral TM WNL, normal hearing, patent oropharynx without erythema/exudate, uvula midline, no peritonsillar abscess, no tongue/uvula swelling  Neck: supple, no tenderness/meningismus/JVD, Trachea midline  Pulm: Bilateral clear BS, normal resp effort, no wheeze/stridor/retractions  CV: RRR, no M/R/G, +2 dist pulses (radial, pedal DP/PT, popliteal)  Abd: soft, +ttp epigastric/ND, +BS, no guarding/rebound tenderness  Musculoskeletal: no edema/erythema/cyanosis, FROM in all extremities, no C/T/L spine ttp  Skin: +jaundice, no rash, no vesicles, no petechaie, no ecchymosis, no swelling  Neuro: AAOx3, CN 2-12 intact, normal sensation, 5/5 motor strength in all extremities, normal gait, no dysmetria    tbili 4.0, bloodwork pending, CT a/p pending, sign out given to Dr. Bond

## 2018-10-17 NOTE — H&P ADULT - GASTROINTESTINAL DETAILS
no rebound tenderness/no rigidity/no guarding/bowel sounds normal bowel sounds normal/no bruit/no rigidity/no organomegaly/no guarding/no rebound tenderness

## 2018-10-17 NOTE — H&P ADULT - PROBLEM SELECTOR PLAN 4
- 2/2 type 2 diabetes  - pt follows pods (Dr. Arroyo) as output  - continue home Gabepentin 800 mg QID - chronic, stable  - continue home irbesartan 300mg qd with hold parameters High Ammonia - currently asymptomatic, AAOx3. pt with hx of HE on last admission, likely 2/2 to NAFLD.  - elevated ammonia levels, pt states they are chronically elevated  - continue home lactulose. Start rifaximin 550mg BID as per GI (Dr. oMrales)

## 2018-10-17 NOTE — H&P ADULT - PROBLEM SELECTOR PLAN 3
- holding home medications: pioglitazone, metformin, toujeo, humalog  - insulin coverage scale w/hypoglycemia protocol  - Accuchecks AC&HS.  - Diet: shanae, NPO after midnight for EGD in AM  - f/u AM HbA1c. - chronic, stable  - pt follows GI (Dr. Morales) as outpt  - HOLD Lasix for NOW   - continue to monitor - chronic, stable  - continue home irbesartan 300mg qd with hold parameters

## 2018-10-17 NOTE — H&P ADULT - PROBLEM SELECTOR PLAN 6
- chronic, stable  - GI following  - continue ursodiol 300mg TID - holding home medications: pioglitazone, metformin, toujeo, Humalog  - insulin coverage scale w/hypoglycemia protocol  - Accu-Cheks q 6 hrs, Lantus 20 u SC Q HS  - Diet: clears, NPO after midnight for EGD in AM  - f/u AM HbA1c. - 2/2 type 2 diabetes  - pt follows pods (Dr. Arroyo) as output  - continue home Gabapentin 800 mg QID

## 2018-10-17 NOTE — H&P ADULT - PROBLEM SELECTOR PLAN 10
PADUA SCORE: 4, pharmacologic ppx indicated  - Lovenox 40mg Subq qd for DVT ppx PADUA SCORE: 4, pharmacologic ppx indicated  - however, start SCDs for DVT ppx due to risk of bleeding with EGD in AM

## 2018-10-17 NOTE — H&P ADULT - NSHPSOCIALHISTORY_GEN_ALL_CORE
Social History:    Marital Status:  (  x )    (   ) Single    (   )    (  )   Occupation:   Lives with: (  ) alone  ( x ) children   ( x ) spouse   (  ) parents  (  ) other    Substance Use (street drugs): ( x ) never used  (  ) other:  Tobacco Usage:  (  x ) never smoked   (   ) former smoker   (   ) current smoker  (     ) pack year  (        ) last cigarette date  Alcohol Usage: Former social drinker, currently denies Etoh use    Health Management    For male:  Last colonoscopy (     ) No  (  x  ) Yes  ( 6/18   ) Date  ( x   ) Normal    Immunization Hx:   Due for Flu shot.

## 2018-10-17 NOTE — H&P ADULT - HISTORY OF PRESENT ILLNESS
61 y/o M PMHX Type 2 DM, HTN, HLD, obesity, renal stones, neuropathy, fatty liver presents with     In the ED, VS: T 98.4, HR 98, /77, RR 17, sat 99% RA, WBC 5.94, H/H 12.5/42.2, plat 108, Lactate 4.1, ammonia 73, Cl 112, CO2 21, BUN 13, Cr 0.71, Glu 189, Ca 7.5, prot 5.5, alb 2.1, bili 4.0, alk phos 165,AST 29, ALT 22, amylase 20, lipase 20,   CT abd/pelvis   CXR  EKG 61 y/o M PMHX Type 2 DM, HTN, HLD, obesity, nephrolithiasis, neuropathy, fatty liver presents with     In the ED, VS: T 98.4, HR 98, /77, RR 17, sat 99% RA, WBC 5.94, H/H 12.5/42.2, plat 108, Lactate 4.1, ammonia 73, Cl 112, CO2 21, BUN 13, Cr 0.71, Glu 189, Ca 7.5, prot 5.5, alb 2.1, bili 4.0, alk phos 165,AST 29, ALT 22, amylase 20, lipase 20, CT abd/pelvis - Impression may be associated with duodenitis and/or peptic ulcer disease; recommend further clinical correlation. 4 mm nonobstructing calcification at the lower pole the left kidney.  CXR  EKG Pt is a 63 y/o M with PMHx of Type 2 DM, HTN, HLD, obesity, nephrolithiasis (last stone 25 years ago), neuropathy, HE, NAFLD, who presents with abdominal pain and vomiting for the past two days. Pt states he has had a decreased appetite since Sunday evening along with nonradiating intermittent epigastric abd pain rated 7/10. Yesterday he felt nauseous and vomited greenish fluid once. Today, he vomited 3 more times in the morning and went to Dr. Morales's office, who recommended pt get bloodwork and have a CT Abd/Pelvis. Pt states after leaving office, he started feeling nauseous and vomited once more before coming into ED. Admits epigastric abd pain, nausea, vomiting. Denies HA, dizziness, CP, SOB, diarrhea, or bloody stools. Denies recent travel or sick contacts.    In the ED, VS: T 98.4, HR 98, /77, RR 17, sat 99% RA, WBC 5.94, H/H 12.5/42.2, plat 108, Lactate 4.1, ammonia 73, Cl 112, CO2 21, BUN 13, Cr 0.71, Glu 189, Ca 7.5, prot 5.5, alb 2.1, bili 4.0, alk phos 165,AST 29, ALT 22, amylase 20, lipase 20, CT abd/pelvis - Impression may be associated with duodenitis and/or peptic ulcer disease; recommend further clinical correlation. 4 mm nonobstructing calcification at the lower pole the left kidney. CXR showing no acute pathology. EKG showing mild prolonged Qtc at 480.

## 2018-10-17 NOTE — H&P ADULT - FAMILY HISTORY
No pertinent family history in first degree relatives Father  Still living? No  Family history of stomach cancer, Age at diagnosis: Age Unknown  Family history of hypertension, Age at diagnosis: Age Unknown  Family history of type 2 diabetes mellitus, Age at diagnosis: Age Unknown

## 2018-10-17 NOTE — H&P ADULT - ATTENDING COMMENTS
Pt seen, examined, case & care plan d/w pt, residents at d  GI DR Carmen QUIR[facility] Labs -stable for AM EGD.

## 2018-10-17 NOTE — CONSULT NOTE ADULT - ASSESSMENT
63 y/o male presents to ED c/o epigastric abdominal pain x 3 days. States he had one episode of diarrhea on monday and then vomited x 1 yesterday and vomited x 2 today. +nausea. Rates pain 8/10, no radiation of pain, gradual onset, worse with palpation. States he was seen by his GI Dr katia today who advised that he have a CT abdomen/pelvis and labs today  now with kidney stone

## 2018-10-17 NOTE — H&P ADULT - PROBLEM SELECTOR PLAN 7
- currently asymptomatic, AAOx3. pt with hx of HE on last admission, likely 2/2 to NAFLD.  - elevated ammonia levels, pt states they are chronically elevated  - continue home lactulose - currently asymptomatic, AAOx3. pt with hx of HE on last admission, likely 2/2 to NAFLD.  - elevated ammonia levels, pt states they are chronically elevated  - continue home lactulose. Start rifaximin 550mg BID as per GI (Dr. Morales) - 2/2 type 2 diabetes  - pt follows pods (Dr. Arroyo) as output  - continue home Gabapentin 800 mg QID - chronic, stable, High Bili   - GI following Dr Morales  - continue ursodiol 300mg TID

## 2018-10-17 NOTE — ED PROVIDER NOTE - OBJECTIVE STATEMENT
63 y/o male presents to ED c/o epigastric abdominal pain x 3 days. States he had one episode of diarrhea 61 y/o male presents to ED c/o epigastric abdominal pain x 3 days. States he had one episode of diarrhea on monday and then vomited x 1 yesterday and vomited x 2 today. +nausea. Rates pain 8/10, no radiation of pain, gradual onset, worse with palpation. States he was seen by his GI Dr montalvo today who advised that he have a CT abdomen/pelvis and labs today. Pt states he decided to come to ER because the pain got worse after he left Dr Montalvo office. Denies any other complaints. Denies f/c, chest pain, sob, numbness, tingling, melena, hematochezia, hematemesis, recent traveling.

## 2018-10-17 NOTE — H&P ADULT - PROBLEM SELECTOR PLAN 5
Pt stated no longer needed this letter, everything at her work has been taken care of. - chronic, stable  - pt follows GI (Dr. Morales) as outpt  - continue to monitor - chronic, stable  - pt follows GI (Dr. Morales) as outpt  - continue Lasix 20mg for volume control  - continue to monitor - chronic, stable, High Bili   - GI following Dr Morales  - continue ursodiol 300mg TID - holding home medications: pioglitazone, metformin, toujeo, Humalog  - insulin coverage scale w/hypoglycemia protocol  - Accu-Cheks q 6 hrs, Lantus 20 u SC Q HS  - Diet: clears, NPO after midnight for EGD in AM  - f/u AM HbA1c.

## 2018-10-17 NOTE — H&P ADULT - PROBLEM SELECTOR PLAN 9
- chronic, stable  - continue home pravastatin 40mg qd - pt with hx of renal stones (last stone 25 years ago, passed spontaneously)  - CT Abd/Pelvis showing 4mm non obstructing calcification in lower pole of L kidney  - pt seen by nephro (Dr. Naqvi),in ER

## 2018-10-17 NOTE — CONSULT NOTE ADULT - SUBJECTIVE AND OBJECTIVE BOX
Patient is a 62y Male whom presented to the hospital with kidney stone     PAST MEDICAL & SURGICAL HISTORY:  Obesity  Fatty liver disease, nonalcoholic  Renal stones: 25 years ago  Hypertension  Neuropathy  Hypercholesteremia  Diabetes  S/P cholecystectomy      MEDICATIONS  (STANDING):  lactulose Syrup 30 Gram(s) Oral daily  pantoprazole  Injectable 40 milliGRAM(s) IV Push every 12 hours  rifaximin 550 milliGRAM(s) Oral two times a day  sodium chloride 0.9%. 1000 milliLiter(s) (75 mL/Hr) IV Continuous <Continuous>  sucralfate suspension 1 Gram(s) Oral two times a day      Allergies    codeine (Anaphylaxis)    Intolerances        SOCIAL HISTORY:  Denies ETOh,Smoking,     FAMILY HISTORY:  No pertinent family history in first degree relatives      REVIEW OF SYSTEMS:    CONSTITUTIONAL: No weakness, fevers or chills  RESPIRATORY: No cough, wheezing, hemoptysis; no shortness of breath  CARDIOVASCULAR: No chest pain or palpitations  GASTROINTESTINAL: No abdominal or epigastric pain. pos  nausea, vomiting,     No diarrhea or constipation. No melena   GENITOURINARY: No dysuria, frequency or hematuria  NEUROLOGICAL: No numbness or weakness  SKIN: dry      VITAL:  T(C): , Max: 37.3 (10-17-18 @ 17:09)  T(F): , Max: 99.1 (10-17-18 @ 17:09)  HR: 98 (10-17-18 @ 14:23)  BP: 130/77 (10-17-18 @ 14:23)  BP(mean): --  RR: 17 (10-17-18 @ 14:23)  SpO2: 99% (10-17-18 @ 14:23)  Wt(kg): --    I and O's:      Weight (kg): 139.7 (10-17 @ 14:23)    PHYSICAL EXAM:    Constitutional: NAD  HEENT: conjunctive   clear   Neck:  No JVD  Respiratory: CTAB  Cardiovascular: S1 and S2  Gastrointestinal: BS+, soft, NT/ND  Extremities: No peripheral edema  Neurological: A/O x 3, no focal deficits  Psychiatric: Normal mood, normal affect  : No Beasley  Skin: No rashes  Access: Not applicable    LABS:                        14.5   5.94  )-----------( 108      ( 17 Oct 2018 15:11 )             42.2     10-17    141  |  112<H>  |  13  ----------------------------<  189<H>  3.7   |  21<L>  |  0.71    Ca    7.5<L>      17 Oct 2018 15:11    TPro  5.5<L>  /  Alb  2.1<L>  /  TBili  4.0<H>  /  DBili  x   /  AST  29  /  ALT  22  /  AlkPhos  165<H>  10-17      Urine Studies:          RADIOLOGY & ADDITIONAL STUDIES:

## 2018-10-17 NOTE — H&P ADULT - PSYCHIATRIC
detailed exam negative Affect and characteristics of appearance, verbalizations, behaviors are appropriate

## 2018-10-17 NOTE — PATIENT PROFILE ADULT - ABILITY TO HEAR (WITH HEARING AID OR HEARING APPLIANCE IF NORMALLY USED):
Mildly to Moderately Impaired: difficulty hearing in some environments or speaker may need to increase volume or speak distinctly/Left eye poor hearing

## 2018-10-17 NOTE — H&P ADULT - NEGATIVE GASTROINTESTINAL SYMPTOMS
no melena/no change in bowel habits/no hematochezia no change in bowel habits/no hematochezia/no diarrhea/no melena

## 2018-10-17 NOTE — ED ADULT NURSE NOTE - NSIMPLEMENTINTERV_GEN_ALL_ED
Implemented All Universal Safety Interventions:  [place] to call system. Call bell, personal items and telephone within reach. Instruct patient to call for assistance. Room bathroom lighting operational. Non-slip footwear when patient is off stretcher. Physically safe environment: no spills, clutter or unnecessary equipment. Stretcher in lowest position, wheels locked, appropriate side rails in place.

## 2018-10-17 NOTE — H&P ADULT - ASSESSMENT
Pt is a 63 y/o M with PMHx of Type 2 DM, HTN, HLD, obesity, nephrolithiasis (last stone 25 years ago), neuropathy, HE, NAFLD, who presents with abdominal pain and vomiting for the past two days, admitted for PUD with EGD scheduled for AM.

## 2018-10-18 ENCOUNTER — RESULT REVIEW (OUTPATIENT)
Age: 63
End: 2018-10-18

## 2018-10-18 LAB
ALBUMIN SERPL ELPH-MCNC: 2.3 G/DL — LOW (ref 3.3–5)
ALP SERPL-CCNC: 143 U/L — HIGH (ref 40–120)
ALT FLD-CCNC: 24 U/L — SIGNIFICANT CHANGE UP (ref 12–78)
AMMONIA BLD-MCNC: 37 UMOL/L — HIGH (ref 11–32)
ANION GAP SERPL CALC-SCNC: 8 MMOL/L — SIGNIFICANT CHANGE UP (ref 5–17)
APTT BLD: 35.4 SEC — SIGNIFICANT CHANGE UP (ref 27.5–37.4)
AST SERPL-CCNC: 28 U/L — SIGNIFICANT CHANGE UP (ref 15–37)
BILIRUB SERPL-MCNC: 3.9 MG/DL — HIGH (ref 0.2–1.2)
BUN SERPL-MCNC: 14 MG/DL — SIGNIFICANT CHANGE UP (ref 7–23)
CALCIUM SERPL-MCNC: 8.5 MG/DL — SIGNIFICANT CHANGE UP (ref 8.5–10.1)
CHLORIDE SERPL-SCNC: 103 MMOL/L — SIGNIFICANT CHANGE UP (ref 96–108)
CO2 SERPL-SCNC: 26 MMOL/L — SIGNIFICANT CHANGE UP (ref 22–31)
CREAT SERPL-MCNC: 0.87 MG/DL — SIGNIFICANT CHANGE UP (ref 0.5–1.3)
GLUCOSE SERPL-MCNC: 172 MG/DL — HIGH (ref 70–99)
GRAM STN FLD: SIGNIFICANT CHANGE UP
HBA1C BLD-MCNC: 10.2 % — HIGH (ref 4–5.6)
HCT VFR BLD CALC: 35.5 % — LOW (ref 39–50)
HGB BLD-MCNC: 11.9 G/DL — LOW (ref 13–17)
INR BLD: 1.59 RATIO — HIGH (ref 0.88–1.16)
LACTATE SERPL-SCNC: 1.8 MMOL/L — SIGNIFICANT CHANGE UP (ref 0.7–2)
MCHC RBC-ENTMCNC: 32.9 PG — SIGNIFICANT CHANGE UP (ref 27–34)
MCHC RBC-ENTMCNC: 33.5 GM/DL — SIGNIFICANT CHANGE UP (ref 32–36)
MCV RBC AUTO: 98.1 FL — SIGNIFICANT CHANGE UP (ref 80–100)
NRBC # BLD: 0 /100 WBCS — SIGNIFICANT CHANGE UP (ref 0–0)
PLATELET # BLD AUTO: 81 K/UL — LOW (ref 150–400)
POTASSIUM SERPL-MCNC: 3.8 MMOL/L — SIGNIFICANT CHANGE UP (ref 3.5–5.3)
POTASSIUM SERPL-SCNC: 3.8 MMOL/L — SIGNIFICANT CHANGE UP (ref 3.5–5.3)
PROT SERPL-MCNC: 5.9 G/DL — LOW (ref 6–8.3)
PROTHROM AB SERPL-ACNC: 17.5 SEC — HIGH (ref 9.8–12.7)
RBC # BLD: 3.62 M/UL — LOW (ref 4.2–5.8)
RBC # FLD: 14 % — SIGNIFICANT CHANGE UP (ref 10.3–14.5)
SODIUM SERPL-SCNC: 137 MMOL/L — SIGNIFICANT CHANGE UP (ref 135–145)
WBC # BLD: 5.32 K/UL — SIGNIFICANT CHANGE UP (ref 3.8–10.5)
WBC # FLD AUTO: 5.32 K/UL — SIGNIFICANT CHANGE UP (ref 3.8–10.5)

## 2018-10-18 PROCEDURE — 88313 SPECIAL STAINS GROUP 2: CPT | Mod: 26

## 2018-10-18 PROCEDURE — 88305 TISSUE EXAM BY PATHOLOGIST: CPT | Mod: 26

## 2018-10-18 PROCEDURE — 88312 SPECIAL STAINS GROUP 1: CPT | Mod: 26

## 2018-10-18 RX ORDER — INSULIN LISPRO 100/ML
10 VIAL (ML) SUBCUTANEOUS
Qty: 0 | Refills: 0 | Status: DISCONTINUED | OUTPATIENT
Start: 2018-10-18 | End: 2018-10-23

## 2018-10-18 RX ORDER — INSULIN GLARGINE 100 [IU]/ML
50 INJECTION, SOLUTION SUBCUTANEOUS AT BEDTIME
Qty: 0 | Refills: 0 | Status: DISCONTINUED | OUTPATIENT
Start: 2018-10-18 | End: 2018-10-23

## 2018-10-18 RX ORDER — LACTULOSE 10 G/15ML
30 SOLUTION ORAL
Qty: 0 | Refills: 0 | Status: DISCONTINUED | OUTPATIENT
Start: 2018-10-18 | End: 2018-10-22

## 2018-10-18 RX ADMIN — LACTULOSE 30 GRAM(S): 10 SOLUTION ORAL at 17:18

## 2018-10-18 RX ADMIN — Medication 1: at 12:28

## 2018-10-18 RX ADMIN — PANTOPRAZOLE SODIUM 40 MILLIGRAM(S): 20 TABLET, DELAYED RELEASE ORAL at 17:18

## 2018-10-18 RX ADMIN — GABAPENTIN 800 MILLIGRAM(S): 400 CAPSULE ORAL at 17:18

## 2018-10-18 RX ADMIN — GABAPENTIN 800 MILLIGRAM(S): 400 CAPSULE ORAL at 11:58

## 2018-10-18 RX ADMIN — Medication 2: at 06:16

## 2018-10-18 RX ADMIN — INSULIN GLARGINE 50 UNIT(S): 100 INJECTION, SOLUTION SUBCUTANEOUS at 22:00

## 2018-10-18 RX ADMIN — Medication 1 GRAM(S): at 17:18

## 2018-10-18 RX ADMIN — GABAPENTIN 800 MILLIGRAM(S): 400 CAPSULE ORAL at 05:37

## 2018-10-18 RX ADMIN — Medication 10 UNIT(S): at 17:20

## 2018-10-18 RX ADMIN — PANTOPRAZOLE SODIUM 40 MILLIGRAM(S): 20 TABLET, DELAYED RELEASE ORAL at 05:37

## 2018-10-18 RX ADMIN — SODIUM CHLORIDE 75 MILLILITER(S): 9 INJECTION INTRAMUSCULAR; INTRAVENOUS; SUBCUTANEOUS at 12:30

## 2018-10-18 RX ADMIN — URSODIOL 300 MILLIGRAM(S): 250 TABLET, FILM COATED ORAL at 21:59

## 2018-10-18 RX ADMIN — URSODIOL 300 MILLIGRAM(S): 250 TABLET, FILM COATED ORAL at 14:05

## 2018-10-18 RX ADMIN — URSODIOL 300 MILLIGRAM(S): 250 TABLET, FILM COATED ORAL at 05:37

## 2018-10-18 RX ADMIN — LACTULOSE 30 GRAM(S): 10 SOLUTION ORAL at 11:57

## 2018-10-18 RX ADMIN — Medication 1 GRAM(S): at 05:38

## 2018-10-18 NOTE — CONSULT NOTE ADULT - SUBJECTIVE AND OBJECTIVE BOX
Chief Complaint:  Patient is a 62y old  Male who presents with a chief complaint of abdominal pain (18 Oct 2018 08:39)    Hepatic encephalopathy  Obesity  Fatty liver disease, nonalcoholic  Renal stones  Hypertension  Neuropathy  Hypercholesteremia  Diabetes  S/P cholecystectomy  No significant past surgical history     HPI:  Pt is a 61 y/o M with PMHx of Type 2 DM, HTN, HLD, obesity, nephrolithiasis (last stone 25 years ago), neuropathy, HE, NAFLD, who presents with abdominal pain and vomiting for the past two days. Pt states he has had a decreased appetite since Sunday evening along with nonradiating intermittent epigastric abd pain rated 7/10. Yesterday he felt nauseous and vomited greenish fluid once. Today, he vomited 3 more times in the morning and went to Dr. Morales's office, who recommended pt get bloodwork and have a CT Abd/Pelvis. Pt states after leaving office, he started feeling nauseous and vomited once more before coming into ED. Admits epigastric abd pain, nausea, vomiting. Denies HA, dizziness, CP, SOB, diarrhea, or bloody stools. Denies recent travel or sick contacts.    In the ED, VS: T 98.4, HR 98, /77, RR 17, sat 99% RA, WBC 5.94, H/H 12.5/42.2, plat 108, Lactate 4.1, ammonia 73, Cl 112, CO2 21, BUN 13, Cr 0.71, Glu 189, Ca 7.5, prot 5.5, alb 2.1, bili 4.0, alk phos 165,AST 29, ALT 22, amylase 20, lipase 20, CT abd/pelvis - Impression may be associated with duodenitis and/or peptic ulcer disease; recommend further clinical correlation. 4 mm nonobstructing calcification at the lower pole the left kidney. CXR showing no acute pathology. EKG showing mild prolonged Qtc at 480. (17 Oct 2018 19:30)    gi consulted for abd pain. chart reviewed. pt seen and examined. c/o abd pain which started this past sunday, sharp epigastric pain, constant, non radiating, associated w non bloody vomiting (which has since resolved). last bm ?10/17 per flowsheets, no bm in few days per pt. denies f/c/n/melena/brbpr. denies hx of pud. last egd/colon done this past june and reportedly normal. hx of ccy. denies toxic habits. father w gastric ca. on asa 81, takes 8 tabs of nsaids qod for foot pain. per overnight rn  no complaints of abd pain overnight, no vomiting, no diarrhea    recent vs/labs/imaging reviewed- afebrile  no leukocytosis  hgb trend noted sp fluids  plts 81 inr 1.59  lactate normalized  ammonia mildly elevated  tbili alp noted  amylase/lipase noted  ct a/p-    Impression:    CT findings as discussed which may be associated with duodenitis and/or   peptic ulcer disease; recommend further clinical correlation.      codeine (Anaphylaxis)      dextrose 40% Gel 15 Gram(s) Oral once PRN  dextrose 5%. 1000 milliLiter(s) IV Continuous <Continuous>  dextrose 50% Injectable 12.5 Gram(s) IV Push once  dextrose 50% Injectable 25 Gram(s) IV Push once  dextrose 50% Injectable 25 Gram(s) IV Push once  gabapentin 800 milliGRAM(s) Oral four times a day  glucagon  Injectable 1 milliGRAM(s) IntraMuscular once PRN  HYDROmorphone  Injectable 1 milliGRAM(s) IV Push three times a day PRN  insulin glargine Injectable (LANTUS) 20 Unit(s) SubCutaneous at bedtime  insulin lispro (HumaLOG) corrective regimen sliding scale   SubCutaneous three times a day before meals  lactulose Syrup 30 Gram(s) Oral daily  losartan 100 milliGRAM(s) Oral daily  pantoprazole  Injectable 40 milliGRAM(s) IV Push every 12 hours  rifaximin 550 milliGRAM(s) Oral two times a day  sodium chloride 0.9%. 1000 milliLiter(s) IV Continuous <Continuous>  sucralfate suspension 1 Gram(s) Oral two times a day  ursodiol Capsule 300 milliGRAM(s) Oral three times a day        FAMILY HISTORY:  Family history of type 2 diabetes mellitus (Father)  Family history of hypertension (Father)  Family history of stomach cancer (Father)        Review of Systems:    General:  No wt loss, fevers, chills, night sweats, fatigue  Eyes:  Good vision, no reported pain  ENT:  No sore throat, pain, runny nose, dysphagia  CV:  No pain, palpitations, no lightheadedness  Resp:  No dyspnea, cough, tachypnea, wheezing  GI: see above  :  No pain, bleeding, incontinence, nocturia  Muscle:  No pain, weakness  Neuro:  No weakness, tingling, memory problems  Psych:  No fatigue, insomnia, mood problems, depression  Endocrine:  No polyuria, polydypsia, cold/heat intolerance  Heme:  No petechiae, ecchymosis, easy bruisability  Skin:  No rash, tattoos, scars, edema    Relevant Family History:   n/c    Relevant Social History: n/c      Physical Exam:    Vital Signs:  Vital Signs Last 24 Hrs  T(C): 36.3 (18 Oct 2018 13:48), Max: 37.3 (17 Oct 2018 17:09)  T(F): 97.3 (18 Oct 2018 13:48), Max: 99.1 (17 Oct 2018 17:09)  HR: 89 (18 Oct 2018 13:48) (87 - 108)  BP: 113/68 (18 Oct 2018 13:48) (108/67 - 134/74)  BP(mean): --  RR: 18 (18 Oct 2018 13:48) (16 - 18)  SpO2: 97% (18 Oct 2018 13:48) (97% - 99%)  Daily     Daily     General:  lying in bed in nad  HEENT:  NC/AT  Chest:  dec bs   Cardiovascular:  Regular rhythm, S1, S2  Abdomen:  obese abd, soft, b/l upper quadrant pain, mild guarding nd  Extremities:  no edema  Skin:  mild jaundice  Neuro/Psych: awake alert responds appropriately    Laboratory:                            11.9   5.32  )-----------( 81       ( 18 Oct 2018 08:59 )             35.5     10-18    137  |  103  |  14  ----------------------------<  172<H>  3.8   |  26  |  0.87    Ca    8.5      18 Oct 2018 08:59    TPro  5.9<L>  /  Alb  2.3<L>  /  TBili  3.9<H>  /  DBili  x   /  AST  28  /  ALT  24  /  AlkPhos  143<H>  10-18    LIVER FUNCTIONS - ( 18 Oct 2018 08:59 )  Alb: 2.3 g/dL / Pro: 5.9 g/dL / ALK PHOS: 143 U/L / ALT: 24 U/L / AST: 28 U/L / GGT: x           PT/INR - ( 18 Oct 2018 08:59 )   PT: 17.5 sec;   INR: 1.59 ratio         PTT - ( 18 Oct 2018 08:59 )  PTT:35.4 sec    Amylase Serum--      Lipase serum--       [id number]  Amylase Serum--      Lipase serum--       [id number]  Amylase Serum20      Lipase serum54       Ammonia--    Imaging:        < from: CT Abdomen and Pelvis w/ IV Cont (10.17.18 @ 16:49) >    EXAM:  CT ABDOMEN AND PELVIS IC                            PROCEDURE DATE:  10/17/2018          INTERPRETATION:  CT ABDOMEN AND PELVIS    CLINICAL INFORMATION:  Epigastric pain.    PROCEDURE:  Using multislice helical CT, and following the intravenous   administration of 95 cc Omnipaque 300 , 2.5 mm sections were obtained   from the domes of the diaphragm to the ischial tuberosities.  Multiplanar   MPR's were performed.    COMPARISON: Abdominal ultrasound 2/5/2018.    FINDINGS:      The patientis status post cholecystectomy common bile duct measures up   to 8 mm.  There are small coarse calcifications centrally within the right hepatic   lobe.    The spleen and adrenal glands appear unremarkable.    The pancreas is unremarkable in appearance.    There is a 4 mm nonobstructing calcification at the lower pole the left   kidney.  No hydroureteronephrosis is noted.    There is arteriosclerotic calcification of the abdominal aorta.  No enlarged retroperitoneal lymphadenopathy is noted.    Evaluation of the stomach and gastrointestinal tract is limited without   distention.  Stomach is fluid-filled. There is diffuse bowel wall thickening involving   the post bulbar duodenum, descending portion of the duodenum, associated   with periduodenal stranding.  Findings may be associated with duodenitis or peptic ulcer disease.  No bowel obstruction is noted.  No localized intra-abdominal fluid collection or pneumoperitoneum is   noted.    There is a small fat-containing periumbilical hernia.    The urinary bladder appears unremarkable.  No pelvic mass or free fluid is noted.  There is possible mild rectal wall thickening, correlate clinically for   proctitis or underlying rectal mass.    There are multilevel degenerative changes of the visualized spine.    Impression:    CT findings as discussed which may be associated with duodenitis and/or   peptic ulcer disease; recommend further clinical correlation.    Other findings as discussed above.                            WARREN ALICEA M.D., ATTENDING RADIOLOGIST  This document has been electronically signed. Oct 17 2018  5:12PM                < end of copied text >

## 2018-10-18 NOTE — PROGRESS NOTE ADULT - ASSESSMENT
Pt is a 61 y/o M with PMHx of Type 2 DM, HTN, HLD, obesity, nephrolithiasis (last stone 25 years ago), neuropathy, HE, NAFLD, who presents with abdominal pain and vomiting for the past two days, admitted for PUD with EGD scheduled for AM. Pt is a 63 y/o M with PMHx of Type 2 DM, HTN, HLD, obesity, nephrolithiasis (last stone 25 years ago), neuropathy, HE, NAFLD, who presents with abdominal pain and vomiting for the past two days, admitted for PUD with EGD scheduled for today.

## 2018-10-18 NOTE — CONSULT NOTE ADULT - PROBLEM SELECTOR RECOMMENDATION 2
with hx of fatty liver  US 2/2018 noted, with liver lab w/u at that time, neg with exception of +asca/tammi  will obtain outpatient records, mri not done on prior admission as pt was dcd  no free fluid on CT  cont ppi, sandra, rifaximin bid  cont lactulose, titrate for 3-4 soft bms/day  trend ammonia levels, trend lfts  monitor for bleeding  monitor for need of additional abd imaging  will follow, plan of care to be dw attg

## 2018-10-18 NOTE — PROGRESS NOTE ADULT - PROBLEM SELECTOR PLAN 3
- chronic, stable  - pt follows GI (Dr. Morales) as outpt  - lasix being held  - continue to monitor - chronic, stable   -elevated Bili -?? Etiology  - pt follows GI (Dr. Morales) as outpt  - Lasix being held  - continue to monitor

## 2018-10-18 NOTE — PROGRESS NOTE ADULT - PROBLEM SELECTOR PLAN 6
- holding home medications: pioglitazone, metformin, toujeo, Humalog  - insulin coverage scale w/hypoglycemia protocol  - Accu-Cheks q 6 hrs-- 180s-200s so far  Lantus 20 u SC Q HS  - NPO for EGD  -HbA1c pending - holding home medications: pioglitazone, metformin, toujeo, Humalog  - insulin coverage scale w/hypoglycemia protocol  - Accu-Cheks q 6 hrs-- 180s-200s so far, A1C 10.4- Endocrine Consult- Dr Perlman  Lantus 20 u SC Q HS  - NPO for EGD  -HbA1c High, will increase Lantus dose & Pre meals Insulin coverage

## 2018-10-18 NOTE — PROGRESS NOTE ADULT - PROBLEM SELECTOR PLAN 1
will check kidney stone gamez is pending   and pt will f/u in my office next week for f/u and prevent stone formation

## 2018-10-18 NOTE — PROGRESS NOTE ADULT - PROBLEM SELECTOR PLAN 1
- likely 2/2 PUD , less likely renal stone, as pain is epigastric in nature. pt states he takes 800mg ibuprofen every other night for neuropathic pain.  - CT Abd/Pelvis showing diffuse bowel wall thickening involving the post bulbar duodenum, descending portion of the duodenum, with danilo duodenal stranding. Findings may be associated with duodenitis or peptic ulcer disease. No bowel obstruction is noted.  - pt seen by GI (Dr. Morales), plan for EGD today  - NPO . Protonix 40mg BID IVPB. Carafate BID  - pt allergic to codeine. Pain control with Dilaudid 1mg q8h PRN.  - ECG showing mild Qtc prolongation. Hold Zofran. Avoid QT prolonging meds.  -Continued monitoring - likely 2/2 PUD , less likely renal stone, as pain is epigastric in nature. pt states he takes 800mg ibuprofen every other night for neuropathic pain.  - CT Abd/Pelvis showing diffuse bowel wall thickening involving the post bulbar duodenum, descending portion of the duodenum, with danilo duodenal stranding. Findings may be associated with duodenitis or peptic ulcer disease. No bowel obstruction is noted.  - pt seen by GI (Dr. Morales), plan for EGD today  - NPO . Protonix 40mg BID IVPB. Carafate BID  - pt allergic to codeine. Pain control with Dilaudid 1mg q8h PRN.  - ECG showing mild Qtc prolongation. Hold Zofran. Avoid QT prolonging meds.  -Drop in H/H likely 2/2 to bleeding Peptic ulcer? EGD scheduled for today  -Obtained T+S in case of need for transfusion  -Continued monitoring - likely 2/2 PUD /gastritis, less likely renal stone, as pain is epigastric in nature. pt states he takes 800mg ibuprofen every other night for neuropathic pain.  - CT Abd/Pelvis showing diffuse bowel wall thickening involving the post bulbar duodenum, descending portion of the duodenum, with danilo duodenal stranding. Findings may be associated with duodenitis or peptic ulcer disease. No bowel obstruction is noted.  - pt seen by GI (Dr. Morales), plan for EGD today  - NPO . Protonix 40mg BID IVPB. Carafate BID  - pt allergic to codeine. Pain control with Dilaudid 1mg q8h PRN.  - ECG showing mild Qtc prolongation. Hold Zofran. Avoid QT prolonging meds.  -Drop in H/H likely 2/2 to bleeding Peptic ulcer? EGD scheduled for today  -Obtained T+S in case of need for transfusion  -Continued monitoring

## 2018-10-18 NOTE — PROGRESS NOTE ADULT - PROBLEM SELECTOR PLAN 7
- 2/2 type 2 diabetes  - pt follows pods (Dr. Arroyo) as output  - continue home Gabapentin 800 mg QID

## 2018-10-18 NOTE — PROGRESS NOTE ADULT - PROBLEM SELECTOR PLAN 2
- currently asymptomatic, AAOx3. pt with hx of HE on last admission, likely 2/2 to NAFLD.  - elevated ammonia levels on admission, chronically elevated per patient  - continue home lactulose. Start rifaximin 550mg BID as per GI (Dr. Morales)  -Trend daily labs

## 2018-10-18 NOTE — PROGRESS NOTE ADULT - SUBJECTIVE AND OBJECTIVE BOX
Patient is a 62y Male whom presented to the hospital with kidney stone     PAST MEDICAL & SURGICAL HISTORY:  Obesity  Fatty liver disease, nonalcoholic  Renal stones: 25 years ago  Hypertension  Neuropathy  Hypercholesteremia  Diabetes  S/P cholecystectomy      MEDICATIONS  (STANDING):  lactulose Syrup 30 Gram(s) Oral daily  pantoprazole  Injectable 40 milliGRAM(s) IV Push every 12 hours  rifaximin 550 milliGRAM(s) Oral two times a day  sodium chloride 0.9%. 1000 milliLiter(s) (75 mL/Hr) IV Continuous <Continuous>  sucralfate suspension 1 Gram(s) Oral two times a day      Allergies    codeine (Anaphylaxis)    Intolerances        SOCIAL HISTORY:  Denies ETOh,Smoking,     FAMILY HISTORY:  No pertinent family history in first degree relatives      REVIEW OF SYSTEMS:    CONSTITUTIONAL: No weakness, fevers or chills  RESPIRATORY: No cough, wheezing, hemoptysis; no shortness of breath  CARDIOVASCULAR: No chest pain or palpitations  GASTROINTESTINAL: No abdominal or epigastric pain. pos  nausea, vomiting,     No diarrhea or constipation. No melena   GENITOURINARY: No dysuria, frequency or hematuria  NEUROLOGICAL: No numbness or weakness  SKIN: dry      VITAL:  T(C): , Max: 37.3 (10-17-18 @ 17:09)  T(F): , Max: 99.1 (10-17-18 @ 17:09)  HR: 98 (10-17-18 @ 14:23)  BP: 130/77 (10-17-18 @ 14:23)  BP(mean): --  RR: 17 (10-17-18 @ 14:23)  SpO2: 99% (10-17-18 @ 14:23)  Wt(kg): --    I and O's:      Weight (kg): 139.7 (10-17 @ 14:23)    PHYSICAL EXAM:    Constitutional: NAD  HEENT: conjunctive   clear   Neck:  No JVD  Respiratory: CTAB  Cardiovascular: S1 and S2  Gastrointestinal: BS+, soft, NT/ND  Extremities: No peripheral edema  Neurological: A/O x 3, no focal deficits  Psychiatric: Normal mood, normal affect  : No Beasley  Skin: No rashes  Access: Not applicable    LABS:                        14.5   5.94  )-----------( 108      ( 17 Oct 2018 15:11 )             42.2     10-17    141  |  112<H>  |  13  ----------------------------<  189<H>  3.7   |  21<L>  |  0.71    Ca    7.5<L>      17 Oct 2018 15:11    TPro  5.5<L>  /  Alb  2.1<L>  /  TBili  4.0<H>  /  DBili  x   /  AST  29  /  ALT  22  /  AlkPhos  165<H>  10-17      Urine Studies:          RADIOLOGY & ADDITIONAL STUDIES: Patient is a 62y Male whom presented to the hospital with kidney stone     PAST MEDICAL & SURGICAL HISTORY:  Obesity  Fatty liver disease, nonalcoholic  Renal stones: 25 years ago  Hypertension  Neuropathy  Hypercholesteremia  Diabetes  S/P cholecystectomy      MEDICATIONS  (STANDING):  lactulose Syrup 30 Gram(s) Oral daily  pantoprazole  Injectable 40 milliGRAM(s) IV Push every 12 hours  rifaximin 550 milliGRAM(s) Oral two times a day  sodium chloride 0.9%. 1000 milliLiter(s) (75 mL/Hr) IV Continuous <Continuous>  sucralfate suspension 1 Gram(s) Oral two times a day      Allergies    codeine (Anaphylaxis)    Intolerances        SOCIAL HISTORY:  Denies ETOh,Smoking,     FAMILY HISTORY:  No pertinent family history in first degree relatives      REVIEW OF SYSTEMS:    CONSTITUTIONAL: No weakness, fevers or chills  RESPIRATORY: No cough, wheezing, hemoptysis; no shortness of breath  CARDIOVASCULAR: No chest pain or palpitations  GASTROINTESTINAL: No abdominal or epigastric pain. pos  nausea, vomiting,     No diarrhea or constipation. No melena   GENITOURINARY: No dysuria, frequency or hematuria  NEUROLOGICAL: No numbness or weakness  SKIN: dry        PHYSICAL EXAM:    Constitutional: NAD  HEENT: conjunctive   clear   Neck:  No JVD  Respiratory: CTAB  Cardiovascular: S1 and S2  Gastrointestinal: BS+, soft, NT/ND  Extremities: No peripheral edema  Neurological: A/O x 3, no focal deficits  Psychiatric: Normal mood, normal affect  : No Beasley  Skin: No rashes  Access: Not applicable                          11.9   5.32  )-----------( 81       ( 18 Oct 2018 08:59 )             35.5       CBC Full  -  ( 18 Oct 2018 08:59 )  WBC Count : 5.32 K/uL  Hemoglobin : 11.9 g/dL  Hematocrit : 35.5 %  Platelet Count - Automated : 81 K/uL  Mean Cell Volume : 98.1 fl  Mean Cell Hemoglobin : 32.9 pg  Mean Cell Hemoglobin Concentration : 33.5 gm/dL  Auto Neutrophil # : x  Auto Lymphocyte # : x  Auto Monocyte # : x  Auto Eosinophil # : x  Auto Basophil # : x  Auto Neutrophil % : x  Auto Lymphocyte % : x  Auto Monocyte % : x  Auto Eosinophil % : x  Auto Basophil % : x      10-18    137  |  103  |  14  ----------------------------<  172<H>  3.8   |  26  |  0.87    Ca    8.5      18 Oct 2018 08:59    TPro  5.9<L>  /  Alb  2.3<L>  /  TBili  3.9<H>  /  DBili  x   /  AST  28  /  ALT  24  /  AlkPhos  143<H>  10-18      CAPILLARY BLOOD GLUCOSE      POCT Blood Glucose.: 164 mg/dL (18 Oct 2018 12:17)  POCT Blood Glucose.: 183 mg/dL (18 Oct 2018 08:15)  POCT Blood Glucose.: 203 mg/dL (18 Oct 2018 06:06)  POCT Blood Glucose.: 218 mg/dL (17 Oct 2018 21:50)      Vital Signs Last 24 Hrs  T(C): 37.1 (18 Oct 2018 16:58), Max: 37.3 (17 Oct 2018 17:09)  T(F): 98.7 (18 Oct 2018 16:58), Max: 99.1 (17 Oct 2018 17:09)  HR: 92 (18 Oct 2018 16:58) (86 - 108)  BP: 108/69 (18 Oct 2018 16:58) (108/67 - 134/74)  BP(mean): --  RR: 18 (18 Oct 2018 16:58) (16 - 18)  SpO2: 98% (18 Oct 2018 16:58) (97% - 98%)        PT/INR - ( 18 Oct 2018 08:59 )   PT: 17.5 sec;   INR: 1.59 ratio         PTT - ( 18 Oct 2018 08:59 )  PTT:35.4 sec

## 2018-10-18 NOTE — PROGRESS NOTE ADULT - ASSESSMENT
61 y/o male presents to ED c/o epigastric abdominal pain x 3 days. States he had one episode of diarrhea on monday and then vomited x 1 yesterday and vomited x 2 today. +nausea. Rates pain 8/10, no radiation of pain, gradual onset, worse with palpation. States he was seen by his GI Dr katia today who advised that he have a CT abdomen/pelvis and labs today  now with kidney stone

## 2018-10-18 NOTE — PROGRESS NOTE ADULT - PROBLEM SELECTOR PLAN 10
PADUA SCORE: 4, pharmacologic ppx indicated  - however, start SCDs for DVT ppx due to risk of bleeding with EGD in AM

## 2018-10-18 NOTE — PROGRESS NOTE ADULT - SUBJECTIVE AND OBJECTIVE BOX
Patient is a 62y old  Male who presents with a chief complaint of abdominal pain (17 Oct 2018 19:30)      INTERVAL HPI:  Pt is a 61 y/o M with PMHx of Type 2 DM, HTN, HLD, obesity, nephrolithiasis (last stone 25 years ago), neuropathy, HE, NAFLD, who presents with abdominal pain and vomiting for the past two days. Pt states he has had a decreased appetite since Sunday evening along with nonradiating intermittent epigastric abd pain rated 7/10. Yesterday he felt nauseous and vomited greenish fluid once. Today, he vomited 3 more times in the morning and went to Dr. Morales's office, who recommended pt get bloodwork and have a CT Abd/Pelvis. Pt states after leaving office, he started feeling nauseous and vomited once more before coming into ED. Admits epigastric abd pain, nausea, vomiting. Denies HA, dizziness, CP, SOB, diarrhea, or bloody stools. Denies recent travel or sick contacts.    In the ED, VS: T 98.4, HR 98, /77, RR 17, sat 99% RA, WBC 5.94, H/H 12.5/42.2, plat 108, Lactate 4.1, ammonia 73, Cl 112, CO2 21, BUN 13, Cr 0.71, Glu 189, Ca 7.5, prot 5.5, alb 2.1, bili 4.0, alk phos 165,AST 29, ALT 22, amylase 20, lipase 20, CT abd/pelvis - Impression may be associated with duodenitis and/or peptic ulcer disease; recommend further clinical correlation. 4 mm nonobstructing calcification at the lower pole the left kidney. CXR showing no acute pathology. EKG showing mild prolonged Qtc at 480.      OVERNIGHT EVENTS: NO ACUTE OVERNIGHT EVENTS.     10/18/2018: Pt seen, examined. Continues to endorse epigastric, dull constant abdominal pain slightly improved. Also endorsing loose nonbloody BM x1 this AM. Denies nausea/vomiting, CP, SOB, urinary symptoms, melena, hematochezia. Awaiting EGD. Zofran held as Qtc 480.     Home Medications:  Aspir 81 oral delayed release tablet: 1 tab(s) orally once a day (17 Oct 2018 20:22)  furosemide 20 mg oral tablet: 1 tab(s) orally once a day (17 Oct 2018 20:22)  gabapentin 800 mg oral tablet: orally 4 times a day (17 Oct 2018 20:22)  HumaLOG 100 units/mL subcutaneous solution: 20 unit(s) subcutaneous 2 times a day(breakfast and lunch) (17 Oct 2018 20:22)  HumaLOG 100 units/mL subcutaneous solution: 30 unit(s) subcutaneous once a day(@dinner) (17 Oct 2018 20:22)  irbesartan 300 mg oral tablet: 1 tab(s) orally once a day (17 Oct 2018 20:22)  metFORMIN 1000 mg oral tablet: 1 tab(s) orally 2 times a day (17 Oct 2018 20:22)  omeprazole 20 mg oral delayed release capsule: 1 cap(s) orally once a day (17 Oct 2018 20:22)  phytonadione 100 mcg oral tablet: 1 tab(s) orally once a day (17 Oct 2018 20:22)  pioglitazone 30 mg oral tablet: 1 tab(s) orally once a day (17 Oct 2018 20:22)  potassium chloride 10 mEq oral tablet, extended release: 1 tab(s) orally once a day (17 Oct 2018 20:22)  pravastatin 40 mg oral tablet: 1 tab(s) orally once a day (17 Oct 2018 20:22)  Toujeo SoloStar: 50 unit(s) subcutaneous once a day (at bedtime) (17 Oct 2018 20:22)  ursodiol 300 mg oral capsule: 1 cap(s) orally 3 times a day (17 Oct 2018 20:22)  Vitamin D3 2000 intl units oral tablet: 1 tab(s) orally once a day (17 Oct 2018 20:22)      MEDICATIONS  (STANDING):  dextrose 5%. 1000 milliLiter(s) (50 mL/Hr) IV Continuous <Continuous>  dextrose 50% Injectable 12.5 Gram(s) IV Push once  dextrose 50% Injectable 25 Gram(s) IV Push once  dextrose 50% Injectable 25 Gram(s) IV Push once  gabapentin 800 milliGRAM(s) Oral four times a day  insulin glargine Injectable (LANTUS) 20 Unit(s) SubCutaneous at bedtime  insulin lispro (HumaLOG) corrective regimen sliding scale   SubCutaneous three times a day before meals  lactulose Syrup 30 Gram(s) Oral daily  losartan 100 milliGRAM(s) Oral daily  pantoprazole  Injectable 40 milliGRAM(s) IV Push every 12 hours  rifaximin 550 milliGRAM(s) Oral two times a day  sodium chloride 0.9%. 1000 milliLiter(s) (75 mL/Hr) IV Continuous <Continuous>  sucralfate suspension 1 Gram(s) Oral two times a day  ursodiol Capsule 300 milliGRAM(s) Oral three times a day    MEDICATIONS  (PRN):  dextrose 40% Gel 15 Gram(s) Oral once PRN Blood Glucose LESS THAN 70 milliGRAM(s)/deciliter  glucagon  Injectable 1 milliGRAM(s) IntraMuscular once PRN Glucose LESS THAN 70 milligrams/deciliter  HYDROmorphone  Injectable 1 milliGRAM(s) IV Push three times a day PRN pain      Allergies    codeine (Anaphylaxis)    Intolerances        REVIEW OF SYSTEMS:  CONSTITUTIONAL: No fever, No chills, No fatigue, No myalgia, No Body ache, No Weakness  EYES: No eye pain,  No visual disturbances, No discharge, NO Redness  ENMT:  No ear pain, No nose bleed, No vertigo; No sinus or throat pain, No Congestion  NECK: No pain, No stiffness  RESPIRATORY: No cough, wheezing, No  hemoptysis, No shortness of breath  CARDIOVASCULAR: No chest pain, palpitations  GASTROINTESTINAL: +epigastric pain. No nausea, No vomiting; No diarrhea or constipation. [ x ] BM-1 loose  GENITOURINARY: No dysuria, No frequency, No urgency, No hematuria, or incontinence  NEUROLOGICAL: No headaches, No dizziness, No numbness, No tingling, No tremors, No weakness  EXT: No Swelling, No Pain, No Edema  SKIN:  [ x ] No itching, burning, rashes, or lesions   MUSCULOSKELETAL: No joint pain or swelling; No muscle pain, No back pain, No extremity pain  PSYCHIATRIC: No depression, anxiety, mood swings or difficulty sleeping at night  PAIN SCALE: [  ] None  [  x] Other-epigastric, improving since admission  ROS Unable to obtain due to - [  ] Dementia  [  ] Lethargy  [  ] Sedated [  ] non verbal  REST OF REVIEW Of SYSTEM - [  x] Normal     Vital Signs Last 24 Hrs  T(C): 37 (18 Oct 2018 05:33), Max: 37.3 (17 Oct 2018 17:09)  T(F): 98.6 (18 Oct 2018 05:33), Max: 99.1 (17 Oct 2018 17:09)  HR: 87 (18 Oct 2018 05:33) (87 - 108)  BP: 108/67 (18 Oct 2018 05:33) (108/67 - 134/74)  BP(mean): --  RR: 18 (18 Oct 2018 05:33) (16 - 18)  SpO2: 97% (18 Oct 2018 05:33) (97% - 99%)  Finger Stick        10-17 @ 07:01  -  10-18 @ 07:00  --------------------------------------------------------  IN: 750 mL / OUT: 0 mL / NET: 750 mL        PHYSICAL EXAM:  GENERAL:  [ x ] NAD , [ x ] well appearing, [  ] Agitated, [  ] Drowsy,  [  ] Lethargy, [  ] confused   HEAD:  [ x ] Normal, [  ] Other  EYES:  [x  ] EOMI, [x  ] PERRLA, [  x] conjunctiva and sclera clear normal, [  ] Other,  [  ] Pallor,[  ] Discharge  ENMT:  [ x ] Normal, [  x] Moist mucous membranes, [  ] Good dentition, [ x ] No Thrush  NECK:  [ x ] Supple, [x  ] No JVD, [ x ] Normal thyroid, [  ] Lymphadenopathy [  ] Other  CHEST/LUNG:  [x  ] Clear to auscultation bilaterally, [x  ] Breath Sounds equal OR Decrease,  [x  ] No rales, [ x ] No rhonchi  [x  ]  No wheezing  HEART:  [ x ] Regular rate and rhythm, [  ] tachycardia, [  ] Bradycardia,  [  ] irregular  x[  ] No murmurs, No rubs, No gallops, [  ] PPM in place (Mfr:  )  ABDOMEN:  [x  ] Soft, [  ] Nontender, [  x] Nondistended, [x  ] No mass, [  x] Bowel sounds present, [x  ] obese [x] other- tender epigastrium  NERVOUS SYSTEM:  [ x ] Alert & Oriented X3, [ x ] Nonfocal  [  ] Confusion  [  ] Encephalopathic [  ] Sedated [  ] Unable to assess, [  ] Other-  EXTREMITIES: [ x ] 2+ Peripheral Pulses, No clubbing, No cyanosis,  [  ] edema B/L lower EXT. [  ] PVD stasis skin changes B/L Lower EXT  LYMPH: No lymphadenopathy noted  SKIN:  [  x] No rashes or lesions, [  ] Pressure Ulcers, [  ] ecchymosis, [  ] Skin Tears, [  ] Other    DIET: NPO    LABS: AM LABS PENDING FOR 10/18                        14.5   5.94  )-----------( 108      ( 17 Oct 2018 15:11 )             42.2     17 Oct 2018 15:11    141    |  112    |  13     ----------------------------<  189    3.7     |  21     |  0.71     Ca    7.5        17 Oct 2018 15:11    TPro  5.5    /  Alb  2.1    /  TBili  4.0    /  DBili  x      /  AST  29     /  ALT  22     /  AlkPhos  165    17 Oct 2018 15:11    PT/INR - ( 17 Oct 2018 22:45 )   PT: 16.4 sec;   INR: 1.49 ratio         PTT - ( 17 Oct 2018 22:45 )  PTT:36.7 sec                         Anemia Panel:        Thyroid Panel:        Amylase, Serum Total: 20 U/L (10-17-18 @ 15:11)  Lipase, Serum: 54 U/L (10-17-18 @ 15:11)          RADIOLOGY & ADDITIONAL TESTS:  < from: CT Abdomen and Pelvis w/ IV Cont (10.17.18 @ 16:49) >  Impression:    CT findings as discussed which may be associated with duodenitis and/or   peptic ulcer disease; recommend further clinical correlation.    Other findings as discussed above.    < end of copied text >    < from: Xray Chest 1 View- PORTABLE-Routine (10.17.18 @ 19:38) >  Impression:    No acute pulmonary process demonstrated.    < end of copied text >        HEALTH ISSUES - PROBLEM Dx:  Need for prophylactic measure: Need for prophylactic measure  Hypercholesteremia: Hypercholesteremia  Hepatic encephalopathy: Hepatic encephalopathy  Biliary cirrhosis: Biliary cirrhosis  Fatty liver disease, nonalcoholic: Fatty liver disease, nonalcoholic  Neuropathy: Neuropathy  Type 2 diabetes mellitus: Type 2 diabetes mellitus  Abdominal pain: Abdominal pain  Obesity: Obesity  Diabetes: Diabetes  Hypertension: Hypertension  Stone in kidney: Stone in kidney          Consultant(s) Notes Reviewed:  [x  ] YES     Care Discussed with [X] Consultants  [ x ] Patient  [  ] Family  [  ]   [  ] Social Service  [x  ] RN, [  ] Physical Therapy  DVT PPX: [  ] Lovenox, [  ] S C Heparin, [  ] Coumadin, [  ] Xarelto, [  ] Eliquis, [  ] Pradaxa, [  ] IV Heparin drip, [  ] SCD [x  ] Contraindication 2 to GI Bleed--EGD today,[  ] Ambulation  Advanced directive: [  ] None, [  ] DNR/DNI Patient is a 62y old  Male who presents with a chief complaint of abdominal pain (17 Oct 2018 19:30)      INTERVAL HPI:  Pt is a 61 y/o M with PMHx of Type 2 DM, HTN, HLD, obesity, nephrolithiasis (last stone 25 years ago), neuropathy, HE, NAFLD, who presents with abdominal pain and vomiting for the past two days. Pt states he has had a decreased appetite since Sunday evening along with nonradiating intermittent epigastric abd pain rated 7/10. Yesterday he felt nauseous and vomited greenish fluid once. Today, he vomited 3 more times in the morning and went to Dr. Morales's office, who recommended pt get bloodwork and have a CT Abd/Pelvis. Pt states after leaving office, he started feeling nauseous and vomited once more before coming into ED. Admits epigastric abd pain, nausea, vomiting. Denies HA, dizziness, CP, SOB, diarrhea, or bloody stools. Denies recent travel or sick contacts.    In the ED, VS: T 98.4, HR 98, /77, RR 17, sat 99% RA, WBC 5.94, H/H 12.5/42.2, plat 108, Lactate 4.1, ammonia 73, Cl 112, CO2 21, BUN 13, Cr 0.71, Glu 189, Ca 7.5, prot 5.5, alb 2.1, bili 4.0, alk phos 165,AST 29, ALT 22, amylase 20, lipase 20, CT abd/pelvis - Impression may be associated with duodenitis and/or peptic ulcer disease; recommend further clinical correlation. 4 mm nonobstructing calcification at the lower pole the left kidney. CXR showing no acute pathology. EKG showing mild prolonged Qtc at 480.      OVERNIGHT EVENTS: NO ACUTE OVERNIGHT EVENTS.     10/18/2018: Pt seen, examined. Continues to endorse epigastric, dull constant abdominal pain slightly improved. Also endorsing loose nonbloody BM x1 this AM. Denies nausea/vomiting, CP, SOB, urinary symptoms, melena, hematochezia. Awaiting EGD. Zofran held as Qtc 480.     Home Medications:  Aspir 81 oral delayed release tablet: 1 tab(s) orally once a day (17 Oct 2018 20:22)  furosemide 20 mg oral tablet: 1 tab(s) orally once a day (17 Oct 2018 20:22)  gabapentin 800 mg oral tablet: orally 4 times a day (17 Oct 2018 20:22)  HumaLOG 100 units/mL subcutaneous solution: 20 unit(s) subcutaneous 2 times a day(breakfast and lunch) (17 Oct 2018 20:22)  HumaLOG 100 units/mL subcutaneous solution: 30 unit(s) subcutaneous once a day(@dinner) (17 Oct 2018 20:22)  irbesartan 300 mg oral tablet: 1 tab(s) orally once a day (17 Oct 2018 20:22)  metFORMIN 1000 mg oral tablet: 1 tab(s) orally 2 times a day (17 Oct 2018 20:22)  omeprazole 20 mg oral delayed release capsule: 1 cap(s) orally once a day (17 Oct 2018 20:22)  phytonadione 100 mcg oral tablet: 1 tab(s) orally once a day (17 Oct 2018 20:22)  pioglitazone 30 mg oral tablet: 1 tab(s) orally once a day (17 Oct 2018 20:22)  potassium chloride 10 mEq oral tablet, extended release: 1 tab(s) orally once a day (17 Oct 2018 20:22)  pravastatin 40 mg oral tablet: 1 tab(s) orally once a day (17 Oct 2018 20:22)  Toujeo SoloStar: 50 unit(s) subcutaneous once a day (at bedtime) (17 Oct 2018 20:22)  ursodiol 300 mg oral capsule: 1 cap(s) orally 3 times a day (17 Oct 2018 20:22)  Vitamin D3 2000 intl units oral tablet: 1 tab(s) orally once a day (17 Oct 2018 20:22)      MEDICATIONS  (STANDING):  dextrose 5%. 1000 milliLiter(s) (50 mL/Hr) IV Continuous <Continuous>  dextrose 50% Injectable 12.5 Gram(s) IV Push once  dextrose 50% Injectable 25 Gram(s) IV Push once  dextrose 50% Injectable 25 Gram(s) IV Push once  gabapentin 800 milliGRAM(s) Oral four times a day  insulin glargine Injectable (LANTUS) 20 Unit(s) SubCutaneous at bedtime  insulin lispro (HumaLOG) corrective regimen sliding scale   SubCutaneous three times a day before meals  lactulose Syrup 30 Gram(s) Oral daily  losartan 100 milliGRAM(s) Oral daily  pantoprazole  Injectable 40 milliGRAM(s) IV Push every 12 hours  rifaximin 550 milliGRAM(s) Oral two times a day  sodium chloride 0.9%. 1000 milliLiter(s) (75 mL/Hr) IV Continuous <Continuous>  sucralfate suspension 1 Gram(s) Oral two times a day  ursodiol Capsule 300 milliGRAM(s) Oral three times a day    MEDICATIONS  (PRN):  dextrose 40% Gel 15 Gram(s) Oral once PRN Blood Glucose LESS THAN 70 milliGRAM(s)/deciliter  glucagon  Injectable 1 milliGRAM(s) IntraMuscular once PRN Glucose LESS THAN 70 milligrams/deciliter  HYDROmorphone  Injectable 1 milliGRAM(s) IV Push three times a day PRN pain      Allergies    codeine (Anaphylaxis)    Intolerances        REVIEW OF SYSTEMS:  CONSTITUTIONAL: No fever, No chills, No fatigue, No myalgia, No Body ache, No Weakness  EYES: No eye pain,  No visual disturbances, No discharge, NO Redness  ENMT:  No ear pain, No nose bleed, No vertigo; No sinus or throat pain, No Congestion  NECK: No pain, No stiffness  RESPIRATORY: No cough, wheezing, No  hemoptysis, No shortness of breath  CARDIOVASCULAR: No chest pain, palpitations  GASTROINTESTINAL: +epigastric pain. No nausea, No vomiting; No diarrhea or constipation. [ x ] BM-1 loose  GENITOURINARY: No dysuria, No frequency, No urgency, No hematuria, or incontinence  NEUROLOGICAL: No headaches, No dizziness, No numbness, No tingling, No tremors, No weakness  EXT: No Swelling, No Pain, No Edema  SKIN:  [ x ] No itching, burning, rashes, or lesions   MUSCULOSKELETAL: No joint pain or swelling; No muscle pain, No back pain, No extremity pain  PSYCHIATRIC: No depression, anxiety, mood swings or difficulty sleeping at night  PAIN SCALE: [  ] None  [  x] Other-epigastric, improving since admission  ROS Unable to obtain due to - [  ] Dementia  [  ] Lethargy  [  ] Sedated [  ] non verbal  REST OF REVIEW Of SYSTEM - [  x] Normal     Vital Signs Last 24 Hrs  T(C): 37 (18 Oct 2018 05:33), Max: 37.3 (17 Oct 2018 17:09)  T(F): 98.6 (18 Oct 2018 05:33), Max: 99.1 (17 Oct 2018 17:09)  HR: 87 (18 Oct 2018 05:33) (87 - 108)  BP: 108/67 (18 Oct 2018 05:33) (108/67 - 134/74)  BP(mean): --  RR: 18 (18 Oct 2018 05:33) (16 - 18)  SpO2: 97% (18 Oct 2018 05:33) (97% - 99%)  Finger Stick        10-17 @ 07:01  -  10-18 @ 07:00  --------------------------------------------------------  IN: 750 mL / OUT: 0 mL / NET: 750 mL        PHYSICAL EXAM:  GENERAL:  [ x ] NAD , [ x ] well appearing, [  ] Agitated, [  ] Drowsy,  [  ] Lethargy, [  ] confused   HEAD:  [ x ] Normal, [  ] Other  EYES:  [x  ] EOMI, [x  ] PERRLA, [  x] conjunctiva and sclera clear normal, [  ] Other,  [  ] Pallor,[  ] Discharge  ENMT:  [ x ] Normal, [  x] Moist mucous membranes, [  ] Good dentition, [ x ] No Thrush  NECK:  [ x ] Supple, [x  ] No JVD, [ x ] Normal thyroid, [  ] Lymphadenopathy [  ] Other  CHEST/LUNG:  [x  ] Clear to auscultation bilaterally, [x  ] Breath Sounds equal OR Decrease,  [x  ] No rales, [ x ] No rhonchi  [x  ]  No wheezing  HEART:  [ x ] Regular rate and rhythm, [  ] tachycardia, [  ] Bradycardia,  [  ] irregular  x[  ] No murmurs, No rubs, No gallops, [  ] PPM in place (Mfr:  )  ABDOMEN:  [x  ] Soft, [  ] Nontender, [  x] Nondistended, [x  ] No mass, [  x] Bowel sounds present, [x  ] obese [x] other- tender epigastrium  NERVOUS SYSTEM:  [ x ] Alert & Oriented X3, [ x ] Nonfocal  [  ] Confusion  [  ] Encephalopathic [  ] Sedated [  ] Unable to assess, [  ] Other-  EXTREMITIES: [ x ] 2+ Peripheral Pulses, No clubbing, No cyanosis,  [  ] edema B/L lower EXT. [  ] PVD stasis skin changes B/L Lower EXT  LYMPH: No lymphadenopathy noted  SKIN:  [  x] No rashes or lesions, [  ] Pressure Ulcers, [  ] ecchymosis, [  ] Skin Tears, [  ] Other    DIET: NPO    LABS:                        11.9   5.32  )-----------( x        ( 18 Oct 2018 08:59 )             35.5     10-18    137  |  103  |  14  ----------------------------<  172<H>  3.8   |  26  |  0.87    Ca    8.5      18 Oct 2018 08:59    TPro  5.9<L>  /  Alb  2.3<L>  /  TBili  3.9<H>  /  DBili  x   /  AST  28  /  ALT  24  /  AlkPhos  143<H>  10-18    LIVER FUNCTIONS - ( 18 Oct 2018 08:59 )  Alb: 2.3 g/dL / Pro: 5.9 g/dL / ALK PHOS: 143 U/L / ALT: 24 U/L / AST: 28 U/L / GGT: x           PT/INR - ( 18 Oct 2018 08:59 )   PT: 17.5 sec;   INR: 1.59 ratio         PTT - ( 18 Oct 2018 08:59 )  PTT:35.4 sec                           Anemia Panel:        Thyroid Panel:        Amylase, Serum Total: 20 U/L (10-17-18 @ 15:11)  Lipase, Serum: 54 U/L (10-17-18 @ 15:11)          RADIOLOGY & ADDITIONAL TESTS:  < from: CT Abdomen and Pelvis w/ IV Cont (10.17.18 @ 16:49) >  Impression:    CT findings as discussed which may be associated with duodenitis and/or   peptic ulcer disease; recommend further clinical correlation.    Other findings as discussed above.    < end of copied text >    < from: Xray Chest 1 View- PORTABLE-Routine (10.17.18 @ 19:38) >  Impression:    No acute pulmonary process demonstrated.    < end of copied text >        HEALTH ISSUES - PROBLEM Dx:  Need for prophylactic measure: Need for prophylactic measure  Hypercholesteremia: Hypercholesteremia  Hepatic encephalopathy: Hepatic encephalopathy  Biliary cirrhosis: Biliary cirrhosis  Fatty liver disease, nonalcoholic: Fatty liver disease, nonalcoholic  Neuropathy: Neuropathy  Type 2 diabetes mellitus: Type 2 diabetes mellitus  Abdominal pain: Abdominal pain  Obesity: Obesity  Diabetes: Diabetes  Hypertension: Hypertension  Stone in kidney: Stone in kidney          Consultant(s) Notes Reviewed:  [x  ] YES     Care Discussed with [X] Consultants  [ x ] Patient  [  ] Family  [  ]   [  ] Social Service  [x  ] RN, [  ] Physical Therapy  DVT PPX: [  ] Lovenox, [  ] S C Heparin, [  ] Coumadin, [  ] Xarelto, [  ] Eliquis, [  ] Pradaxa, [  ] IV Heparin drip, [  ] SCD [x  ] Contraindication 2 to GI Bleed--EGD today,[  ] Ambulation  Advanced directive: [  ] None, [  ] DNR/DNI Patient is a 62y old  Male who presents with a chief complaint of abdominal pain (17 Oct 2018 19:30)      INTERVAL HPI:  Pt is a 63 y/o M with PMHx of Type 2 DM, HTN, HLD, obesity, nephrolithiasis (last stone 25 years ago), neuropathy, HE, NAFLD, who presents with abdominal pain and vomiting for the past two days. Pt states he has had a decreased appetite since Sunday evening along with nonradiating intermittent epigastric abd pain rated 7/10. Yesterday he felt nauseous and vomited greenish fluid once. Today, he vomited 3 more times in the morning and went to Dr. Morales's office, who recommended pt get bloodwork and have a CT Abd/Pelvis. Pt states after leaving office, he started feeling nauseous and vomited once more before coming into ED. Admits epigastric abd pain, nausea, vomiting. Denies HA, dizziness, CP, SOB, diarrhea, or bloody stools. Denies recent travel or sick contacts.    In the ED, VS: T 98.4, HR 98, /77, RR 17, sat 99% RA, WBC 5.94, H/H 12.5/42.2, plat 108, Lactate 4.1, ammonia 73, Cl 112, CO2 21, BUN 13, Cr 0.71, Glu 189, Ca 7.5, prot 5.5, alb 2.1, bili 4.0, alk phos 165,AST 29, ALT 22, amylase 20, lipase 20, CT abd/pelvis - Impression may be associated with duodenitis and/or peptic ulcer disease; recommend further clinical correlation. 4 mm nonobstructing calcification at the lower pole the left kidney. CXR showing no acute pathology. EKG showing mild prolonged Qtc at 480.    10/18/2018: Pt seen, examined. Continues to endorse epigastric, dull constant abdominal pain slightly improved. Also endorsing loose nonbloody BM x1 this AM. Denies nausea/vomiting, CP, SOB, urinary symptoms, melena, hematochezia. Awaiting EGD. Zofran held as Qtc 480. NPO for EGD, On Lactulose, drop in H/H    OVERNIGHT EVENTS: NO ACUTE OVERNIGHT EVENTS.     Home Medications:  Aspir 81 oral delayed release tablet: 1 tab(s) orally once a day (17 Oct 2018 20:22)  furosemide 20 mg oral tablet: 1 tab(s) orally once a day (17 Oct 2018 20:22)  gabapentin 800 mg oral tablet: orally 4 times a day (17 Oct 2018 20:22)  HumaLOG 100 units/mL subcutaneous solution: 20 unit(s) subcutaneous 2 times a day(breakfast and lunch) (17 Oct 2018 20:22)  HumaLOG 100 units/mL subcutaneous solution: 30 unit(s) subcutaneous once a day(@dinner) (17 Oct 2018 20:22)  irbesartan 300 mg oral tablet: 1 tab(s) orally once a day (17 Oct 2018 20:22)  metFORMIN 1000 mg oral tablet: 1 tab(s) orally 2 times a day (17 Oct 2018 20:22)  omeprazole 20 mg oral delayed release capsule: 1 cap(s) orally once a day (17 Oct 2018 20:22)  phytonadione 100 mcg oral tablet: 1 tab(s) orally once a day (17 Oct 2018 20:22)  pioglitazone 30 mg oral tablet: 1 tab(s) orally once a day (17 Oct 2018 20:22)  potassium chloride 10 mEq oral tablet, extended release: 1 tab(s) orally once a day (17 Oct 2018 20:22)  pravastatin 40 mg oral tablet: 1 tab(s) orally once a day (17 Oct 2018 20:22)  Toujeo SoloStar: 50 unit(s) subcutaneous once a day (at bedtime) (17 Oct 2018 20:22)  ursodiol 300 mg oral capsule: 1 cap(s) orally 3 times a day (17 Oct 2018 20:22)  Vitamin D3 2000 intl units oral tablet: 1 tab(s) orally once a day (17 Oct 2018 20:22)      MEDICATIONS  (STANDING):  dextrose 5%. 1000 milliLiter(s) (50 mL/Hr) IV Continuous <Continuous>  dextrose 50% Injectable 12.5 Gram(s) IV Push once  dextrose 50% Injectable 25 Gram(s) IV Push once  dextrose 50% Injectable 25 Gram(s) IV Push once  gabapentin 800 milliGRAM(s) Oral four times a day  insulin glargine Injectable (LANTUS) 20 Unit(s) SubCutaneous at bedtime  insulin lispro (HumaLOG) corrective regimen sliding scale   SubCutaneous three times a day before meals  lactulose Syrup 30 Gram(s) Oral daily  losartan 100 milliGRAM(s) Oral daily  pantoprazole  Injectable 40 milliGRAM(s) IV Push every 12 hours  rifaximin 550 milliGRAM(s) Oral two times a day  sodium chloride 0.9%. 1000 milliLiter(s) (75 mL/Hr) IV Continuous <Continuous>  sucralfate suspension 1 Gram(s) Oral two times a day  ursodiol Capsule 300 milliGRAM(s) Oral three times a day    MEDICATIONS  (PRN):  dextrose 40% Gel 15 Gram(s) Oral once PRN Blood Glucose LESS THAN 70 milliGRAM(s)/deciliter  glucagon  Injectable 1 milliGRAM(s) IntraMuscular once PRN Glucose LESS THAN 70 milligrams/deciliter  HYDROmorphone  Injectable 1 milliGRAM(s) IV Push three times a day PRN pain      Allergies    codeine (Anaphylaxis)    Intolerances        REVIEW OF SYSTEMS:  CONSTITUTIONAL: No fever, No chills, No fatigue, No myalgia, No Body ache, No Weakness  EYES: No eye pain,  No visual disturbances, No discharge, NO Redness  ENMT:  No ear pain, No nose bleed, No vertigo; No sinus or throat pain, No Congestion  NECK: No pain, No stiffness  RESPIRATORY: No cough, wheezing, No  hemoptysis, No shortness of breath  CARDIOVASCULAR: No chest pain, palpitations  GASTROINTESTINAL: +epigastric pain. No nausea, No vomiting; No diarrhea or constipation. [ x ] BM-1 loose  GENITOURINARY: No dysuria, No frequency, No urgency, No hematuria, or incontinence  NEUROLOGICAL: No headaches, No dizziness, No numbness, No tingling, No tremors, No weakness  EXT: No Swelling, No Pain, No Edema  SKIN:  [ x ] No itching, burning, rashes, or lesions   MUSCULOSKELETAL: No joint pain or swelling; No muscle pain, No back pain, No extremity pain  PSYCHIATRIC: No depression, anxiety, mood swings or difficulty sleeping at night  PAIN SCALE: [  ] None  [  x] Other-epigastric, improving since admission  ROS Unable to obtain due to - [  ] Dementia  [  ] Lethargy  [  ] Sedated [  ] non verbal  REST OF REVIEW Of SYSTEM - [  x] Normal     Vital Signs Last 24 Hrs  T(C): 37 (18 Oct 2018 05:33), Max: 37.3 (17 Oct 2018 17:09)  T(F): 98.6 (18 Oct 2018 05:33), Max: 99.1 (17 Oct 2018 17:09)  HR: 87 (18 Oct 2018 05:33) (87 - 108)  BP: 108/67 (18 Oct 2018 05:33) (108/67 - 134/74)  BP(mean): --  RR: 18 (18 Oct 2018 05:33) (16 - 18)  SpO2: 97% (18 Oct 2018 05:33) (97% - 99%)  Finger Stick        10-17 @ 07:01  -  10-18 @ 07:00  --------------------------------------------------------  IN: 750 mL / OUT: 0 mL / NET: 750 mL        PHYSICAL EXAM:  GENERAL:  [ x ] NAD , [ x ] well appearing, [  ] Agitated, [  ] Drowsy,  [  ] Lethargy, [  ] confused   HEAD:  [ x ] Normal, [  ] Other  EYES:  [x  ] EOMI, [x  ] PERRLA, [  x] conjunctiva and sclera clear normal, [  ] Other,  [  ] Pallor,[  ] Discharge  ENMT:  [ x ] Normal, [  x] Moist mucous membranes, [  ] Good dentition, [ x ] No Thrush  NECK:  [ x ] Supple, [x  ] No JVD, [ x ] Normal thyroid, [  ] Lymphadenopathy [  ] Other  CHEST/LUNG:  [x  ] Clear to auscultation bilaterally, [x  ] Breath Sounds equal OR Decrease,  [x  ] No rales, [ x ] No rhonchi  [x  ]  No wheezing  HEART:  [ x ] Regular rate and rhythm, [  ] tachycardia, [  ] Bradycardia,  [  ] irregular  x[  ] No murmurs, No rubs, No gallops, [  ] PPM in place (Mfr:  )  ABDOMEN:  [x  ] Soft, [  ] Nontender, [  x] Nondistended, [x  ] No mass, [  x] Bowel sounds present, [x  ] obese [x] other- tender epigastrium  NERVOUS SYSTEM:  [ x ] Alert & Oriented X3, [ x ] Nonfocal  [  ] Confusion  [  ] Encephalopathic [  ] Sedated [  ] Unable to assess, [  ] Other-  EXTREMITIES: [ x ] 2+ Peripheral Pulses, No clubbing, No cyanosis,  [  ] edema B/L lower EXT. [  ] PVD stasis skin changes B/L Lower EXT  LYMPH: No lymphadenopathy noted  SKIN:  [  x] No rashes or lesions, [  ] Pressure Ulcers, [  ] ecchymosis, [  ] Skin Tears, [  ] Other    DIET: NPO    LABS:                        11.9   5.32  )-----------( x        ( 18 Oct 2018 08:59 )             35.5     10-18    137  |  103  |  14  ----------------------------<  172<H>  3.8   |  26  |  0.87    Ca    8.5      18 Oct 2018 08:59    TPro  5.9<L>  /  Alb  2.3<L>  /  TBili  3.9<H>  /  DBili  x   /  AST  28  /  ALT  24  /  AlkPhos  143<H>  10-18    LIVER FUNCTIONS - ( 18 Oct 2018 08:59 )  Alb: 2.3 g/dL / Pro: 5.9 g/dL / ALK PHOS: 143 U/L / ALT: 24 U/L / AST: 28 U/L / GGT: x           PT/INR - ( 18 Oct 2018 08:59 )   PT: 17.5 sec;   INR: 1.59 ratio         PTT - ( 18 Oct 2018 08:59 )  PTT:35.4 sec      Amylase, Serum Total: 20 U/L (10-17-18 @ 15:11)  Lipase, Serum: 54 U/L (10-17-18 @ 15:11)    Lactate, Blood (10.18.18 @ 12:59)    Lactate, Blood: 1.8 mmol/L    Ammonia, Serum (10.18.18 @ 12:59)    Ammonia, Serum: 37 umol/L        RADIOLOGY & ADDITIONAL TESTS:  < from: CT Abdomen and Pelvis w/ IV Cont (10.17.18 @ 16:49) >  Impression:    CT findings as discussed which may be associated with duodenitis and/or   peptic ulcer disease; recommend further clinical correlation.    Other findings as discussed above.    < end of copied text >    < from: Xray Chest 1 View- PORTABLE-Routine (10.17.18 @ 19:38) >  Impression:    No acute pulmonary process demonstrated.    < end of copied text >        HEALTH ISSUES - PROBLEM Dx:  Need for prophylactic measure: Need for prophylactic measure  Hypercholesteremia: Hypercholesteremia  Hepatic encephalopathy: Hepatic encephalopathy  Biliary cirrhosis: Biliary cirrhosis  Fatty liver disease, nonalcoholic: Fatty liver disease, nonalcoholic  Neuropathy: Neuropathy  Type 2 diabetes mellitus: Type 2 diabetes mellitus  Abdominal pain: Abdominal pain  Obesity: Obesity  Diabetes: Diabetes  Hypertension: Hypertension  Stone in kidney: Stone in kidney        Consultant(s) Notes Reviewed:  [x  ] YES     Care Discussed with [X] Consultants  [ x ] Patient  [  ] Family  [  ]   [  ] Social Service  [x  ] RN, [  ] Physical Therapy  DVT PPX: [  ] Lovenox, [  ] S C Heparin, [  ] Coumadin, [  ] Xarelto, [  ] Eliquis, [  ] Pradaxa, [  ] IV Heparin drip, [x ] SCD [x  ] Contraindication 2 to GI Bleed--EGD today,[  ] Ambulation  Advanced directive: [  ] None, [  ] DNR/DNI

## 2018-10-18 NOTE — PROGRESS NOTE ADULT - PROBLEM SELECTOR PLAN 5
- chronic, stable, High Bili   - GI following Dr Morales  - continue ursodiol 300mg TID - chronic, Unclear Etiology  stable, High Bili   - GI following Dr Morales D/W Plan for MRI -Liver with IV Contrast, MRCP NON contrast in AM  - continue ursodiol 300mg TID

## 2018-10-18 NOTE — CONSULT NOTE ADULT - ASSESSMENT
Pt is a 61 y/o M with PMHx of Type 2 DM, HTN, HLD, obesity, nephrolithiasis (last stone 25 years ago), neuropathy, HE, NAFLD, who presents with abdominal pain and vomiting for the past two days. gi consulted for abd pain

## 2018-10-19 ENCOUNTER — TRANSCRIPTION ENCOUNTER (OUTPATIENT)
Age: 63
End: 2018-10-19

## 2018-10-19 DIAGNOSIS — R78.81 BACTEREMIA: ICD-10-CM

## 2018-10-19 DIAGNOSIS — E11.65 TYPE 2 DIABETES MELLITUS WITH HYPERGLYCEMIA: ICD-10-CM

## 2018-10-19 LAB
AFP-TM SERPL-MCNC: 3.5 NG/ML — SIGNIFICANT CHANGE UP
ALBUMIN SERPL ELPH-MCNC: 2.3 G/DL — LOW (ref 3.3–5)
ALP SERPL-CCNC: 133 U/L — HIGH (ref 40–120)
ALT FLD-CCNC: 25 U/L — SIGNIFICANT CHANGE UP (ref 12–78)
AMMONIA BLD-MCNC: 69 UMOL/L — HIGH (ref 11–32)
ANION GAP SERPL CALC-SCNC: 7 MMOL/L — SIGNIFICANT CHANGE UP (ref 5–17)
APPEARANCE UR: CLEAR — SIGNIFICANT CHANGE UP
AST SERPL-CCNC: 36 U/L — SIGNIFICANT CHANGE UP (ref 15–37)
BACTERIA # UR AUTO: NEGATIVE — SIGNIFICANT CHANGE UP
BILIRUB DIRECT SERPL-MCNC: 1.4 MG/DL — HIGH (ref 0.05–0.2)
BILIRUB INDIRECT FLD-MCNC: 1.9 MG/DL — HIGH (ref 0.2–1)
BILIRUB SERPL-MCNC: 3.3 MG/DL — HIGH (ref 0.2–1.2)
BILIRUB UR-MCNC: ABNORMAL
BUN SERPL-MCNC: 11 MG/DL — SIGNIFICANT CHANGE UP (ref 7–23)
CALCIUM SERPL-MCNC: 8.7 MG/DL — SIGNIFICANT CHANGE UP (ref 8.5–10.1)
CHLORIDE SERPL-SCNC: 107 MMOL/L — SIGNIFICANT CHANGE UP (ref 96–108)
CO2 SERPL-SCNC: 27 MMOL/L — SIGNIFICANT CHANGE UP (ref 22–31)
COLOR SPEC: YELLOW — SIGNIFICANT CHANGE UP
CREAT SERPL-MCNC: 0.88 MG/DL — SIGNIFICANT CHANGE UP (ref 0.5–1.3)
DIFF PNL FLD: NEGATIVE — SIGNIFICANT CHANGE UP
EPI CELLS # UR: NEGATIVE — SIGNIFICANT CHANGE UP
GLUCOSE SERPL-MCNC: 132 MG/DL — HIGH (ref 70–99)
GLUCOSE UR QL: NEGATIVE — SIGNIFICANT CHANGE UP
HCT VFR BLD CALC: 36.2 % — LOW (ref 39–50)
HGB BLD-MCNC: 12.3 G/DL — LOW (ref 13–17)
KETONES UR-MCNC: NEGATIVE — SIGNIFICANT CHANGE UP
LEUKOCYTE ESTERASE UR-ACNC: NEGATIVE — SIGNIFICANT CHANGE UP
MCHC RBC-ENTMCNC: 33.2 PG — SIGNIFICANT CHANGE UP (ref 27–34)
MCHC RBC-ENTMCNC: 34 GM/DL — SIGNIFICANT CHANGE UP (ref 32–36)
MCV RBC AUTO: 97.8 FL — SIGNIFICANT CHANGE UP (ref 80–100)
NITRITE UR-MCNC: NEGATIVE — SIGNIFICANT CHANGE UP
NRBC # BLD: 0 /100 WBCS — SIGNIFICANT CHANGE UP (ref 0–0)
PH UR: 7 — SIGNIFICANT CHANGE UP (ref 5–8)
PLATELET # BLD AUTO: 84 K/UL — LOW (ref 150–400)
POTASSIUM SERPL-MCNC: 4 MMOL/L — SIGNIFICANT CHANGE UP (ref 3.5–5.3)
POTASSIUM SERPL-SCNC: 4 MMOL/L — SIGNIFICANT CHANGE UP (ref 3.5–5.3)
POTASSIUM UR-SCNC: 50 MMOL/L — SIGNIFICANT CHANGE UP
PROT SERPL-MCNC: 5.9 G/DL — LOW (ref 6–8.3)
PROT UR-MCNC: NEGATIVE — SIGNIFICANT CHANGE UP
RBC # BLD: 3.7 M/UL — LOW (ref 4.2–5.8)
RBC # FLD: 13.9 % — SIGNIFICANT CHANGE UP (ref 10.3–14.5)
RBC CASTS # UR COMP ASSIST: SIGNIFICANT CHANGE UP /HPF (ref 0–4)
SODIUM SERPL-SCNC: 141 MMOL/L — SIGNIFICANT CHANGE UP (ref 135–145)
SODIUM UR-SCNC: 149 MMOL/L — SIGNIFICANT CHANGE UP
SP GR SPEC: 1.01 — SIGNIFICANT CHANGE UP (ref 1.01–1.02)
UROBILINOGEN FLD QL: 12
WBC # BLD: 3.97 K/UL — SIGNIFICANT CHANGE UP (ref 3.8–10.5)
WBC # FLD AUTO: 3.97 K/UL — SIGNIFICANT CHANGE UP (ref 3.8–10.5)
WBC UR QL: SIGNIFICANT CHANGE UP

## 2018-10-19 RX ORDER — ENOXAPARIN SODIUM 100 MG/ML
40 INJECTION SUBCUTANEOUS DAILY
Qty: 0 | Refills: 0 | Status: DISCONTINUED | OUTPATIENT
Start: 2018-10-19 | End: 2018-10-23

## 2018-10-19 RX ORDER — LACTULOSE 10 G/15ML
45 SOLUTION ORAL
Qty: 2700 | Refills: 0 | OUTPATIENT
Start: 2018-10-19 | End: 2018-11-17

## 2018-10-19 RX ORDER — SUCRALFATE 1 G
1 TABLET ORAL
Qty: 120 | Refills: 0 | OUTPATIENT
Start: 2018-10-19 | End: 2018-11-17

## 2018-10-19 RX ORDER — PIPERACILLIN AND TAZOBACTAM 4; .5 G/20ML; G/20ML
3.38 INJECTION, POWDER, LYOPHILIZED, FOR SOLUTION INTRAVENOUS EVERY 8 HOURS
Qty: 0 | Refills: 0 | Status: DISCONTINUED | OUTPATIENT
Start: 2018-10-19 | End: 2018-10-23

## 2018-10-19 RX ORDER — SUCRALFATE 1 G
1 TABLET ORAL EVERY 6 HOURS
Qty: 0 | Refills: 0 | Status: DISCONTINUED | OUTPATIENT
Start: 2018-10-19 | End: 2018-10-23

## 2018-10-19 RX ORDER — PIPERACILLIN AND TAZOBACTAM 4; .5 G/20ML; G/20ML
3.38 INJECTION, POWDER, LYOPHILIZED, FOR SOLUTION INTRAVENOUS ONCE
Qty: 0 | Refills: 0 | Status: COMPLETED | OUTPATIENT
Start: 2018-10-19 | End: 2018-10-19

## 2018-10-19 RX ORDER — PANTOPRAZOLE SODIUM 20 MG/1
40 TABLET, DELAYED RELEASE ORAL
Qty: 0 | Refills: 0 | Status: DISCONTINUED | OUTPATIENT
Start: 2018-10-19 | End: 2018-10-23

## 2018-10-19 RX ADMIN — Medication 1 GRAM(S): at 17:20

## 2018-10-19 RX ADMIN — Medication 1 GRAM(S): at 05:59

## 2018-10-19 RX ADMIN — Medication 1 GRAM(S): at 12:16

## 2018-10-19 RX ADMIN — PIPERACILLIN AND TAZOBACTAM 25 GRAM(S): 4; .5 INJECTION, POWDER, LYOPHILIZED, FOR SOLUTION INTRAVENOUS at 22:24

## 2018-10-19 RX ADMIN — PIPERACILLIN AND TAZOBACTAM 25 GRAM(S): 4; .5 INJECTION, POWDER, LYOPHILIZED, FOR SOLUTION INTRAVENOUS at 05:59

## 2018-10-19 RX ADMIN — INSULIN GLARGINE 50 UNIT(S): 100 INJECTION, SOLUTION SUBCUTANEOUS at 22:23

## 2018-10-19 RX ADMIN — Medication 2: at 17:21

## 2018-10-19 RX ADMIN — PANTOPRAZOLE SODIUM 40 MILLIGRAM(S): 20 TABLET, DELAYED RELEASE ORAL at 17:21

## 2018-10-19 RX ADMIN — Medication 1 MILLIGRAM(S): at 08:38

## 2018-10-19 RX ADMIN — Medication 10 UNIT(S): at 12:15

## 2018-10-19 RX ADMIN — Medication 10 UNIT(S): at 17:21

## 2018-10-19 RX ADMIN — URSODIOL 300 MILLIGRAM(S): 250 TABLET, FILM COATED ORAL at 06:26

## 2018-10-19 RX ADMIN — GABAPENTIN 800 MILLIGRAM(S): 400 CAPSULE ORAL at 23:25

## 2018-10-19 RX ADMIN — PANTOPRAZOLE SODIUM 40 MILLIGRAM(S): 20 TABLET, DELAYED RELEASE ORAL at 05:59

## 2018-10-19 RX ADMIN — Medication 1 GRAM(S): at 23:25

## 2018-10-19 RX ADMIN — LACTULOSE 30 GRAM(S): 10 SOLUTION ORAL at 05:59

## 2018-10-19 RX ADMIN — ENOXAPARIN SODIUM 40 MILLIGRAM(S): 100 INJECTION SUBCUTANEOUS at 12:17

## 2018-10-19 RX ADMIN — GABAPENTIN 800 MILLIGRAM(S): 400 CAPSULE ORAL at 12:15

## 2018-10-19 RX ADMIN — URSODIOL 300 MILLIGRAM(S): 250 TABLET, FILM COATED ORAL at 22:23

## 2018-10-19 RX ADMIN — GABAPENTIN 800 MILLIGRAM(S): 400 CAPSULE ORAL at 05:59

## 2018-10-19 RX ADMIN — GABAPENTIN 800 MILLIGRAM(S): 400 CAPSULE ORAL at 01:02

## 2018-10-19 RX ADMIN — URSODIOL 300 MILLIGRAM(S): 250 TABLET, FILM COATED ORAL at 13:06

## 2018-10-19 RX ADMIN — GABAPENTIN 800 MILLIGRAM(S): 400 CAPSULE ORAL at 17:21

## 2018-10-19 RX ADMIN — PIPERACILLIN AND TAZOBACTAM 200 GRAM(S): 4; .5 INJECTION, POWDER, LYOPHILIZED, FOR SOLUTION INTRAVENOUS at 01:03

## 2018-10-19 RX ADMIN — SODIUM CHLORIDE 75 MILLILITER(S): 9 INJECTION INTRAMUSCULAR; INTRAVENOUS; SUBCUTANEOUS at 06:07

## 2018-10-19 RX ADMIN — LACTULOSE 30 GRAM(S): 10 SOLUTION ORAL at 17:20

## 2018-10-19 RX ADMIN — SODIUM CHLORIDE 75 MILLILITER(S): 9 INJECTION INTRAMUSCULAR; INTRAVENOUS; SUBCUTANEOUS at 23:27

## 2018-10-19 RX ADMIN — LOSARTAN POTASSIUM 100 MILLIGRAM(S): 100 TABLET, FILM COATED ORAL at 05:59

## 2018-10-19 RX ADMIN — PIPERACILLIN AND TAZOBACTAM 25 GRAM(S): 4; .5 INJECTION, POWDER, LYOPHILIZED, FOR SOLUTION INTRAVENOUS at 13:06

## 2018-10-19 RX ADMIN — Medication 2: at 12:16

## 2018-10-19 NOTE — DISCHARGE NOTE ADULT - CARE PROVIDER_API CALL
Perlman, Craig D (DO), Medicine  [address] [place] [address]  [address]  [place] [address]  Phone: [phone]  Fax: [phone]    marci myrick  [address] [place] [address]  Phone: [phone]  Fax: (   )    -    Javy Morales (), Gastroenterology; Internal Medicine  [address] [place] [address]  Phone: [phone]  Fax: [phone] Javy Morales (DO), Gastroenterology; Internal Medicine  [address] [place] [address]  Phone: [phone]  Fax: [phone]    marci myrick  [address] [place] [address]  Phone: [phone]  Fax: (       - Javy Morales (), Gastroenterology; Internal Medicine  [address] [place] [address]  Phone: [phone]  Fax: [phone]    marci myrick  [address] [place] [address]  Phone: [phone]  Fax: (   )    -    Carlos Manuel Rios), Infectious Disease; Internal Medicine  [address] [place] [address]  Phone: [phone]  Fax: [phone]    Thai Mcdonald), Hematology; Medical Oncology  [address] [place] [address]  Phone: [phone]  Fax: [phone] Javy Morales (), Gastroenterology; Internal Medicine  [address] [place] [address]  Phone: [phone]  Fax: [phone]    Carlos Manuel Rios), Infectious Disease; Internal Medicine  [address] [facility]  [address]  [place] [address]  Phone: [phone]  Fax: [phone]    Hon LOGAN Rios), Hematology; Internal Medicine; Medical Oncology  [address] [facility]  [address]  [place] [address]  Phone: [phone]  Fax: [phone]    Ivanna Montana), Neurology  [address] [place] [address]  Phone: [phone]  Fax: [phone]

## 2018-10-19 NOTE — PROGRESS NOTE ADULT - SUBJECTIVE AND OBJECTIVE BOX
Patient is a 62y Male whom presented to the hospital with kidney stone     PAST MEDICAL & SURGICAL HISTORY:  Obesity  Fatty liver disease, nonalcoholic  Renal stones: 25 years ago  Hypertension  Neuropathy  Hypercholesteremia  Diabetes  S/P cholecystectomy      MEDICATIONS  (STANDING):  lactulose Syrup 30 Gram(s) Oral daily  pantoprazole  Injectable 40 milliGRAM(s) IV Push every 12 hours  rifaximin 550 milliGRAM(s) Oral two times a day  sodium chloride 0.9%. 1000 milliLiter(s) (75 mL/Hr) IV Continuous <Continuous>  sucralfate suspension 1 Gram(s) Oral two times a day      Allergies    codeine (Anaphylaxis)    Intolerances        SOCIAL HISTORY:  Denies ETOh,Smoking,     FAMILY HISTORY:  No pertinent family history in first degree relatives      REVIEW OF SYSTEMS:    CONSTITUTIONAL: No weakness, fevers or chills  RESPIRATORY: No cough, wheezing, hemoptysis; no shortness of breath  CARDIOVASCULAR: No chest pain or palpitations  GASTROINTESTINAL: No abdominal or epigastric pain. pos  nausea, vomiting,     No diarrhea or constipation. No melena   GENITOURINARY: No dysuria, frequency or hematuria  NEUROLOGICAL: No numbness or weakness  SKIN: dry        PHYSICAL EXAM:    Constitutional: NAD  HEENT: conjunctive   clear   Neck:  No JVD  Respiratory: CTAB  Cardiovascular: S1 and S2  Gastrointestinal: BS+, soft, NT/ND  Extremities: No peripheral edema  Neurological: A/O x 3, no focal deficits  Psychiatric: Normal mood, normal affect  : No Beasley  Skin: No rashes  Access: Not applicable                          11.9   5.32  )-----------( 81       ( 18 Oct 2018 08:59 )             35.5       CBC Full  -  ( 18 Oct 2018 08:59 )  WBC Count : 5.32 K/uL  Hemoglobin : 11.9 g/dL  Hematocrit : 35.5 %  Platelet Count - Automated : 81 K/uL  Mean Cell Volume : 98.1 fl  Mean Cell Hemoglobin : 32.9 pg  Mean Cell Hemoglobin Concentration : 33.5 gm/dL  Auto Neutrophil # : x  Auto Lymphocyte # : x  Auto Monocyte # : x  Auto Eosinophil # : x  Auto Basophil # : x  Auto Neutrophil % : x  Auto Lymphocyte % : x  Auto Monocyte % : x  Auto Eosinophil % : x  Auto Basophil % : x      10-18    137  |  103  |  14  ----------------------------<  172<H>  3.8   |  26  |  0.87    Ca    8.5      18 Oct 2018 08:59    TPro  5.9<L>  /  Alb  2.3<L>  /  TBili  3.9<H>  /  DBili  x   /  AST  28  /  ALT  24  /  AlkPhos  143<H>  10-18      CAPILLARY BLOOD GLUCOSE      POCT Blood Glucose.: 164 mg/dL (18 Oct 2018 12:17)  POCT Blood Glucose.: 183 mg/dL (18 Oct 2018 08:15)  POCT Blood Glucose.: 203 mg/dL (18 Oct 2018 06:06)  POCT Blood Glucose.: 218 mg/dL (17 Oct 2018 21:50)      Vital Signs Last 24 Hrs  T(C): 37.1 (18 Oct 2018 16:58), Max: 37.3 (17 Oct 2018 17:09)  T(F): 98.7 (18 Oct 2018 16:58), Max: 99.1 (17 Oct 2018 17:09)  HR: 92 (18 Oct 2018 16:58) (86 - 108)  BP: 108/69 (18 Oct 2018 16:58) (108/67 - 134/74)  BP(mean): --  RR: 18 (18 Oct 2018 16:58) (16 - 18)  SpO2: 98% (18 Oct 2018 16:58) (97% - 98%)        PT/INR - ( 18 Oct 2018 08:59 )   PT: 17.5 sec;   INR: 1.59 ratio         PTT - ( 18 Oct 2018 08:59 )  PTT:35.4 sec

## 2018-10-19 NOTE — CONSULT NOTE ADULT - SUBJECTIVE AND OBJECTIVE BOX
Patient is a 62y old  Male who presents with a chief complaint of abdominal pain (18 Oct 2018 17:00)      Reason For Consult: dm2 uncontrolled    HPI:  Pt is a 61 y/o M with PMHx of Type 2 DM, HTN, HLD, obesity, nephrolithiasis (last stone 25 years ago), neuropathy, HE, NAFLD, who presents with abdominal pain and vomiting for the past two days. Pt states he has had a decreased appetite since Sunday evening along with nonradiating intermittent epigastric abd pain rated 7/10. Yesterday he felt nauseous and vomited greenish fluid once. Today, he vomited 3 more times in the morning and went to Dr. Morales's office, who recommended pt get bloodwork and have a CT Abd/Pelvis. Pt states after leaving office, he started feeling nauseous and vomited once more before coming into ED. Admits epigastric abd pain, nausea, vomiting. Denies HA, dizziness, CP, SOB, diarrhea, or bloody stools. Denies recent travel or sick contacts.    In the ED, VS: T 98.4, HR 98, /77, RR 17, sat 99% RA, WBC 5.94, H/H 12.5/42.2, plat 108, Lactate 4.1, ammonia 73, Cl 112, CO2 21, BUN 13, Cr 0.71, Glu 189, Ca 7.5, prot 5.5, alb 2.1, bili 4.0, alk phos 165,AST 29, ALT 22, amylase 20, lipase 20, CT abd/pelvis - Impression may be associated with duodenitis and/or peptic ulcer disease; recommend further clinical correlation. 4 mm nonobstructing calcification at the lower pole the left kidney. CXR showing no acute pathology. EKG showing mild prolonged Qtc at 480. (17 Oct 2018 19:30)      PAST MEDICAL & SURGICAL HISTORY:  Hepatic encephalopathy  Obesity  Fatty liver disease, nonalcoholic  Renal stones: 25 years ago  Hypertension  Neuropathy  Hypercholesteremia  Diabetes  S/P cholecystectomy      FAMILY HISTORY:  Family history of type 2 diabetes mellitus (Father)  Family history of hypertension (Father)  Family history of stomach cancer (Father)        Social History:    MEDICATIONS  (STANDING):  dextrose 5%. 1000 milliLiter(s) (50 mL/Hr) IV Continuous <Continuous>  dextrose 50% Injectable 12.5 Gram(s) IV Push once  dextrose 50% Injectable 25 Gram(s) IV Push once  dextrose 50% Injectable 25 Gram(s) IV Push once  gabapentin 800 milliGRAM(s) Oral four times a day  insulin glargine Injectable (LANTUS) 50 Unit(s) SubCutaneous at bedtime  insulin lispro (HumaLOG) corrective regimen sliding scale   SubCutaneous three times a day before meals  insulin lispro Injectable (HumaLOG) 10 Unit(s) SubCutaneous three times a day before meals  lactulose Syrup 30 Gram(s) Oral two times a day  losartan 100 milliGRAM(s) Oral daily  pantoprazole  Injectable 40 milliGRAM(s) IV Push every 12 hours  piperacillin/tazobactam IVPB. 3.375 Gram(s) IV Intermittent every 8 hours  rifaximin 550 milliGRAM(s) Oral two times a day  sodium chloride 0.9%. 1000 milliLiter(s) (75 mL/Hr) IV Continuous <Continuous>  sucralfate suspension 1 Gram(s) Oral two times a day  ursodiol Capsule 300 milliGRAM(s) Oral three times a day    MEDICATIONS  (PRN):  dextrose 40% Gel 15 Gram(s) Oral once PRN Blood Glucose LESS THAN 70 milliGRAM(s)/deciliter  glucagon  Injectable 1 milliGRAM(s) IntraMuscular once PRN Glucose LESS THAN 70 milligrams/deciliter  LORazepam   Injectable 1 milliGRAM(s) IntraMuscular once PRN 20 min prior to MRI        T(C): 36.7 (10-19-18 @ 05:18), Max: 37.1 (10-18-18 @ 16:58)  HR: 75 (10-19-18 @ 05:18) (75 - 92)  BP: 129/68 (10-19-18 @ 05:18) (108/69 - 129/68)  RR: 18 (10-19-18 @ 05:18) (17 - 18)  SpO2: 95% (10-19-18 @ 05:18) (94% - 98%)  Wt(kg): --    PHYSICAL EXAM:  GENERAL: NAD, well-groomed, well-developed  HEAD:  Atraumatic, Normocephalic  NECK: Supple, No JVD, Normal thyroid  CHEST/LUNG: Clear to percussion bilaterally; No rales, rhonchi, wheezing, or rubs  HEART: Regular rate and rhythm; No murmurs, rubs, or gallops  ABDOMEN: Soft, Nontender, Nondistended; Bowel sounds present  EXTREMITIES:  2+ Peripheral Pulses, No clubbing, cyanosis, or edema  SKIN: No rashes or lesions    CAPILLARY BLOOD GLUCOSE      POCT Blood Glucose.: 130 mg/dL (19 Oct 2018 08:10)  POCT Blood Glucose.: 192 mg/dL (18 Oct 2018 21:30)  POCT Blood Glucose.: 150 mg/dL (18 Oct 2018 17:04)  POCT Blood Glucose.: 164 mg/dL (18 Oct 2018 12:17)                            11.9   5.32  )-----------( 81       ( 18 Oct 2018 08:59 )             35.5       CMP:  10-18 @ 08:59  SGPT 24  Albumin 2.3   Alk Phos 143   Anion Gap 8   SGOT 28   Total Bili 3.9   BUN 14   Calcium Total 8.5   CO2 26   Chloride 103   Creatinine 0.87   eGFR if    eGFR if non AA 92   Glucose 172   Potassium 3.8   Protein 5.9   Sodium 137      Thyroid Function Tests:      Diabetes Tests: 10-18 @ 10:50 HbA1c 10.2 C-Peptide --         Radiology:

## 2018-10-19 NOTE — DISCHARGE NOTE ADULT - MEDICATION SUMMARY - MEDICATIONS TO TAKE
I will START or STAY ON the medications listed below when I get home from the hospital:    BD ultra fine Angela needles  -- 1  subcutaneous once a day   -- Indication: For Needle    irbesartan 300 mg oral tablet  -- 1 tab(s) by mouth once a day  -- Indication: For Hypertension    phytonadione 100 mcg oral tablet  -- 1 tab(s) by mouth once a day  -- Indication: For Vit K supplements    gabapentin 800 mg oral tablet  -- 1 tab(s) by mouth 4 times a day   -- Indication: For Neuropathy    Toujeo Max SoloStar 300 units/mL subcutaneous solution  -- 50 unit(s) subcutaneous once a day (at bedtime)   -- Check with your doctor before becoming pregnant.  Do not drink alcoholic beverages when taking this medication.  Expires___________________  It is very important that you take or use this exactly as directed.  Do not skip doses or discontinue unless directed by your doctor.  Keep in refrigerator.  Do not freeze.  Obtain medical advice before taking any non-prescription drugs as some may affect the action of this medication.  This drug may impair the ability to drive or operate machinery.  Use care until you become familiar with its effects.    -- Indication: For Diabetes mellitus type 2, uncontrolled    HumaLOG 100 units/mL subcutaneous solution  -- 20 unit(s) subcutaneous 2 times a day(breakfast and lunch)  -- Indication: For Diabetes mellitus type 2, uncontrolled    HumaLOG 100 units/mL subcutaneous solution  -- 30 unit(s) subcutaneous once a day(@dinner)  -- Indication: For Diabetes mellitus type 2, uncontrolled    pravastatin 40 mg oral tablet  -- 1 tab(s) by mouth once a day  -- Indication: For Hypercholesteremia    furosemide 20 mg oral tablet  -- 1 tab(s) by mouth once a day  -- Indication: For Hypertension    ursodiol 300 mg oral capsule  -- 1 cap(s) by mouth 3 times a day  -- Indication: For High Bilirubin    lactulose 10 g/15 mL oral syrup  -- 45 milliliter(s) by mouth 2 times a day  -- Indication: For Hepatic encephalopathy    potassium chloride 10 mEq oral tablet, extended release  -- 1 tab(s) by mouth once a day  -- Indication: For k supplement    rifAXIMin 550 mg oral tablet  -- 1 tab(s) by mouth 2 times a day  -- Indication: For Hepatic encephalopathy    Carafate 1 g oral tablet  -- 1 tab(s) by mouth every 6 hours  -- Indication: For PUD, Duodenal ulcer    omeprazole 20 mg oral delayed release capsule  -- 1 cap(s) by mouth 2 times a day  -- Indication: For Gastritis, Esophagitis    Vitamin D3 2000 intl units oral tablet  -- 1 tab(s) by mouth once a day  -- Indication: For Vit I will START or STAY ON the medications listed below when I get home from the hospital:    BD ultra fine Angela needles  -- 1  subcutaneous once a day   -- Indication: For Needle    irbesartan 300 mg oral tablet  -- 1 tab(s) by mouth once a day  -- Indication: For Hypertension    phytonadione 100 mcg oral tablet  -- 1 tab(s) by mouth once a day  -- Indication: For Vit K  with liver disease    gabapentin 800 mg oral tablet  -- 1 tab(s) by mouth 4 times a day   -- Indication: For Neuropathy    Toujeo Max SoloStar 300 units/mL subcutaneous solution  -- 50 unit(s) subcutaneous once a day (at bedtime)   -- Check with your doctor before becoming pregnant.  Do not drink alcoholic beverages when taking this medication.  Expires___________________  It is very important that you take or use this exactly as directed.  Do not skip doses or discontinue unless directed by your doctor.  Keep in refrigerator.  Do not freeze.  Obtain medical advice before taking any non-prescription drugs as some may affect the action of this medication.  This drug may impair the ability to drive or operate machinery.  Use care until you become familiar with its effects.    -- Indication: For Diabetes mellitus type 2, uncontrolled    HumaLOG 100 units/mL subcutaneous solution  -- 20 unit(s) subcutaneous 2 times a day(breakfast and lunch)  -- Indication: For Diabetes mellitus type 2, uncontrolled    HumaLOG 100 units/mL subcutaneous solution  -- 30 unit(s) subcutaneous once a day(@dinner)  -- Indication: For Diabetes mellitus type 2, uncontrolled    pravastatin 40 mg oral tablet  -- 1 tab(s) by mouth once a day  -- Indication: For Hypercholesteremia    ursodiol 300 mg oral capsule  -- 1 cap(s) by mouth 3 times a day  -- Indication: For High Bilirubin    lactulose 10 g/15 mL oral syrup  -- 45 milliliter(s) by mouth 4 times a day   -- Indication: For High Ammonia level     potassium chloride 10 mEq oral tablet, extended release  -- 1 tab(s) by mouth once a day  -- Indication: For k supplement    rifAXIMin 550 mg oral tablet  -- 1 tab(s) by mouth 2 times a day  -- Indication: For Hepatic encephalopathy    Carafate 1 g oral tablet  -- 1 tab(s) by mouth every 6 hours  -- Indication: For PUD, Duodenal ulcer    omeprazole 20 mg oral delayed release capsule  -- 1 cap(s) by mouth 2 times a day  -- Indication: For PUD/ Duodenal ulcers    Vitamin D3 2000 intl units oral tablet  -- 1 tab(s) by mouth once a day  -- Indication: For Vit I will START or STAY ON the medications listed below when I get home from the hospital:    BD ultra fine Angela needles  -- 1  subcutaneous once a day   -- Indication: For Needle    metroNIDAZOLE 500 mg oral tablet  -- 1 tab(s) by mouth every 8 hours  -- Indication: For Bacteremia    irbesartan 300 mg oral tablet  -- 1 tab(s) by mouth once a day  -- Indication: For Hypertension    phytonadione 100 mcg oral tablet  -- 1 tab(s) by mouth once a day  -- Indication: For Vit K  with liver disease    gabapentin 800 mg oral tablet  -- 1 tab(s) by mouth 4 times a day   -- Indication: For Neuropathy    Toujeo Max SoloStar 300 units/mL subcutaneous solution  -- 50 unit(s) subcutaneous once a day (at bedtime)   -- Check with your doctor before becoming pregnant.  Do not drink alcoholic beverages when taking this medication.  Expires___________________  It is very important that you take or use this exactly as directed.  Do not skip doses or discontinue unless directed by your doctor.  Keep in refrigerator.  Do not freeze.  Obtain medical advice before taking any non-prescription drugs as some may affect the action of this medication.  This drug may impair the ability to drive or operate machinery.  Use care until you become familiar with its effects.    -- Indication: For Diabetes mellitus type 2, uncontrolled    HumaLOG 100 units/mL subcutaneous solution  -- 20 unit(s) subcutaneous 2 times a day(breakfast and lunch)  -- Indication: For Diabetes mellitus type 2, uncontrolled    HumaLOG 100 units/mL subcutaneous solution  -- 30 unit(s) subcutaneous once a day(@dinner)  -- Indication: For Diabetes mellitus type 2, uncontrolled    pravastatin 40 mg oral tablet  -- 1 tab(s) by mouth once a day  -- Indication: For Hypercholesteremia    ursodiol 300 mg oral capsule  -- 1 cap(s) by mouth 3 times a day  -- Indication: For High Bilirubin    lactulose 10 g/15 mL oral syrup  -- 45 milliliter(s) by mouth 4 times a day   -- Indication: For High Ammonia level     potassium chloride 10 mEq oral tablet, extended release  -- 1 tab(s) by mouth once a day  -- Indication: For k supplement    rifAXIMin 550 mg oral tablet  -- 1 tab(s) by mouth 2 times a day  -- Indication: For Hepatic encephalopathy    Carafate 1 g oral tablet  -- 1 tab(s) by mouth every 6 hours  -- Indication: For PUD, Duodenal ulcer    Bacid (LAC) oral tablet  -- 1 tab(s) by mouth 2 times a day for  2weeks  -- Indication: For Probiotics    omeprazole 20 mg oral delayed release capsule  -- 1 cap(s) by mouth 2 times a day  -- Indication: For PUD/ Duodenal ulcers    ciprofloxacin 500 mg oral tablet  -- 1 tab(s) by mouth every 12 hours  -- Indication: For Bacteremia    Vitamin D3 2000 intl units oral tablet  -- 1 tab(s) by mouth once a day  -- Indication: For Vit

## 2018-10-19 NOTE — CONSULT NOTE ADULT - PROBLEM SELECTOR RECOMMENDATION 9
cont lantus 50 units daily  cont humalog 10 units tid before meals plus scale coverage  cont cons cho diet  goal bg 100-180 in hosp setting
CT showed duodenitis and/or peptic ulcer disease  npo/ivf  iv ppi bid  carafate bid  f/u cxs  no overt s/s gib, hgb appears at baseline  trend h/h, transfuse prn  pain control  monitor abd exam  plan for egd today for further eval, pt agreeable
will check kidney stone gamez

## 2018-10-19 NOTE — PROGRESS NOTE ADULT - PROBLEM SELECTOR PLAN 6
- holding home medications: pioglitazone, metformin, toujeo, Humalog  - insulin coverage scale w/hypoglycemia protocol  - Accu-Cheks q 6 hrs-- 180s-200s so far, A1C 10.4- Endocrine Consult- Dr Perlman  Lantus 20 u SC Q HS  - NPO for EGD  -HbA1c High, will increase Lantus dose & Pre meals Insulin coverage - chronic, Unclear Etiology  stable, High Bili   - GI following Dr Morales D/W Plan for MRI -Liver with IV Contrast, MRCP NON contrast in AM..UNABLE TO BE PERFORMED 2/2 BODY HABITUS. Will f/u outpt  US 2/2018 noted, with liver lab w/u at that time, neg with exception of +asca/tammi; cont ppi, sandra, rifaximin bid  cont lactulose bid, titrate for 3-4 soft bms/day  trend ammonia levels, trend - chronic, stable  - continue home irbesartan 300mg qd with hold parameters

## 2018-10-19 NOTE — PROGRESS NOTE ADULT - PROBLEM SELECTOR PLAN 1
- likely 2/2 PUD /gastritis, less likely renal stone, as pain is epigastric in nature. pt states he takes 800mg ibuprofen every other night for neuropathic pain.  - CT Abd/Pelvis showing diffuse bowel wall thickening involving the post bulbar duodenum, descending portion of the duodenum, with danilo duodenal stranding. Findings may be associated with duodenitis or peptic ulcer disease. No bowel obstruction is noted.  - pt seen by GI (Dr. Morales), plan for EGD today  - NPO . Protonix 40mg BID IVPB. Carafate BID  - pt allergic to codeine. Pain control with Dilaudid 1mg q8h PRN.  - ECG showing mild Qtc prolongation. Hold Zofran. Avoid QT prolonging meds.  -Drop in H/H likely 2/2 to bleeding Peptic ulcer? EGD scheduled for today  -Obtained T+S in case of need for transfusion  -Continued monitoring -REsolving. S/p EGD yesterday: egd showed non bleeding esophageal ulcers, esophagitis, non bleeding duodenal ulcers, no EV/GV  - likely 2/2 PUD /gastritis pt states he takes 800mg ibuprofen every other night for neuropathic pain.  - CT Abd/Pelvis showing diffuse bowel wall thickening involving the post bulbar duodenum, descending portion of the duodenum, with danilo duodenal stranding. Findings may be associated with duodenitis or peptic ulcer disease. No bowel obstruction is noted.  - pt seen by GI (Dr. Morales/[facility]): ppi bid/carafate q6, f/u bxs, trend h/h, transfuse prn  - DASH/TLC  diet. Protonix 40mg BID IVPB. Carafate BID  - pt allergic to codeine. Pain control with Dilaudid 1mg q8h PRN.  - ECG showing mild Qtc prolongation on admission. Hold Zofran. Avoid QT prolonging meds.  -H/H stable  -Continued monitoring -Resolving. S/p EGD yesterday: egd showed non bleeding esophageal ulcers, esophagitis, non bleeding duodenal ulcers, no EV/GV  - likely 2/2 PUD /gastritis pt states he takes 800mg ibuprofen every other night for neuropathic pain.  - CT Abd/Pelvis showing diffuse bowel wall thickening involving the post bulbar duodenum, descending portion of the duodenum, with danilo duodenal stranding. Findings may be associated with duodenitis or peptic ulcer disease. No bowel obstruction is noted.  - pt seen by GI (Dr. Morales/[facility]): ppi bid/carafate q6, f/u bxs,   - DASH/TLC  diet. Protonix 40mg BID IVPB. Carafate BID  - pt allergic to codeine. Pain control with Dilaudid 1mg given  - ECG showing mild Qtc prolongation on admission. Hold Zofran. Avoid QT prolonging meds.  -H/H stable  -Continued monitoring

## 2018-10-19 NOTE — DISCHARGE NOTE ADULT - MEDICATION SUMMARY - MEDICATIONS TO CHANGE
I will SWITCH the dose or number of times a day I take the medications listed below when I get home from the hospital:    omeprazole 20 mg oral delayed release capsule  -- 1 cap(s) by mouth once a day    lactulose 10 g/15 mL oral syrup  -- 45 milliliter(s) by mouth once a day

## 2018-10-19 NOTE — PROGRESS NOTE ADULT - ASSESSMENT
Pt is a 63 y/o M with PMHx of Type 2 DM, HTN, HLD, obesity, nephrolithiasis (last stone 25 years ago), neuropathy, HE, NAFLD, who presents with abdominal pain and vomiting for the past two days, admitted for PUD with EGD scheduled for today. Pt is a 61 y/o M with PMHx of Type 2 DM, HTN, HLD, obesity, nephrolithiasis (last stone 25 years ago), neuropathy, HE, NAFLD, who presents with abdominal pain and vomiting for the past two days, admitted for PUD with EGD done, Duodenal ulcers, No Bleeding , Blood cultures + Gram Variable rods,

## 2018-10-19 NOTE — PROGRESS NOTE ADULT - SUBJECTIVE AND OBJECTIVE BOX
The patient was interviewed and evaluated  62y Male    Vital Signs Last 24 Hrs  T(C): 36.7 (19 Oct 2018 05:18), Max: 37.1 (18 Oct 2018 16:58)  T(F): 98.1 (19 Oct 2018 05:18), Max: 98.7 (18 Oct 2018 16:58)  HR: 75 (19 Oct 2018 05:18) (75 - 92)  BP: 129/68 (19 Oct 2018 05:18) (108/69 - 129/68)  BP(mean): --  RR: 18 (19 Oct 2018 05:18) (17 - 18)  SpO2: 95% (19 Oct 2018 05:18) (94% - 98%)    Pt seen, doing well, no anesthesia complications or complaints noted or reported.   No Nausea    No additional recommendations.     Pain well controlled

## 2018-10-19 NOTE — DISCHARGE NOTE ADULT - CARE PROVIDERS DIRECT ADDRESSES
,DirectAddress_Unknown,DirectAddress_Unknown,DirectAddress_Unknown ,DirectAddress_Unknown,DirectAddress_Unknown ,DirectAddress_Unknown,DirectAddress_Unknown,DirectAddress_Unknown,[email or web address] ,DirectAddress_Unknown,DirectAddress_Unknown,DirectAddress_Unknown,DirectAddress_Unknown

## 2018-10-19 NOTE — PROGRESS NOTE ADULT - PROBLEM SELECTOR PLAN 1
pain resolved  CT showed duodenitis and/or peptic ulcer disease  sp egd showed non bleeding esophageal ulcers, esophagitis, non bleeding duodenal ulcers, no EV/GV  ppi bid/carafate q6  f/u bxs  trend h/h, transfuse prn  diet as tolerated  monitor abd exam  will need close outpatient gi f/u upon dc

## 2018-10-19 NOTE — PROGRESS NOTE ADULT - SUBJECTIVE AND OBJECTIVE BOX
INTERVAL HPI/OVERNIGHT EVENTS:  pt seen and examined  denies n/v/abd pain, +bm yesterday  afebrile overnight labs noted  per overnight rn +bld cxs  sp egd yesterday    MEDICATIONS  (STANDING):  dextrose 5%. 1000 milliLiter(s) (50 mL/Hr) IV Continuous <Continuous>  dextrose 50% Injectable 12.5 Gram(s) IV Push once  dextrose 50% Injectable 25 Gram(s) IV Push once  dextrose 50% Injectable 25 Gram(s) IV Push once  gabapentin 800 milliGRAM(s) Oral four times a day  insulin glargine Injectable (LANTUS) 50 Unit(s) SubCutaneous at bedtime  insulin lispro (HumaLOG) corrective regimen sliding scale   SubCutaneous three times a day before meals  insulin lispro Injectable (HumaLOG) 10 Unit(s) SubCutaneous three times a day before meals  lactulose Syrup 30 Gram(s) Oral two times a day  losartan 100 milliGRAM(s) Oral daily  pantoprazole  Injectable 40 milliGRAM(s) IV Push every 12 hours  piperacillin/tazobactam IVPB. 3.375 Gram(s) IV Intermittent every 8 hours  rifaximin 550 milliGRAM(s) Oral two times a day  sodium chloride 0.9%. 1000 milliLiter(s) (75 mL/Hr) IV Continuous <Continuous>  sucralfate suspension 1 Gram(s) Oral two times a day  ursodiol Capsule 300 milliGRAM(s) Oral three times a day    MEDICATIONS  (PRN):  dextrose 40% Gel 15 Gram(s) Oral once PRN Blood Glucose LESS THAN 70 milliGRAM(s)/deciliter  glucagon  Injectable 1 milliGRAM(s) IntraMuscular once PRN Glucose LESS THAN 70 milligrams/deciliter      Allergies    codeine (Anaphylaxis)    Intolerances        Review of Systems:    General:  No wt loss, fevers, chills, night sweats, fatigue   Eyes:  Good vision, no reported pain  ENT:  No sore throat, pain, runny nose, dysphagia  CV:  No pain, palpitations, hypo/hypertension  Resp:  No dyspnea, cough, tachypnea, wheezing  GI:  No pain, No nausea, No vomiting, No diarrhea, No constipation, No weight loss, No fever, No pruritis, No rectal bleeding, No melena, No dysphagia  :  No pain, bleeding, incontinence, nocturia  Muscle:  No pain, weakness  Neuro:  No weakness, tingling, memory problems  Psych:  No fatigue, insomnia, mood problems, depression  Endocrine:  No polyuria, polydypsia, cold/heat intolerance  Heme:  No petechiae, ecchymosis, easy bruisability  Skin:  No rash, tattoos, scars, edema      Vital Signs Last 24 Hrs  T(C): 36.7 (19 Oct 2018 05:18), Max: 37.1 (18 Oct 2018 16:58)  T(F): 98.1 (19 Oct 2018 05:18), Max: 98.7 (18 Oct 2018 16:58)  HR: 75 (19 Oct 2018 05:18) (75 - 92)  BP: 129/68 (19 Oct 2018 05:18) (108/69 - 129/68)  BP(mean): --  RR: 18 (19 Oct 2018 05:18) (17 - 18)  SpO2: 95% (19 Oct 2018 05:18) (94% - 98%)    PHYSICAL EXAM:  General:  lying in bed in nad  HEENT:  NC/AT  Chest:  dec bs   Cardiovascular:  Regular rhythm, S1, S2  Abdomen:  obese abd, soft, nt nd  Extremities:  no edema  Skin:  no rash  Neuro/Psych: awake alert responds appropriately      LABS:                        12.3   3.97  )-----------( 84       ( 19 Oct 2018 08:46 )             36.2     10-19    141  |  107  |  11  ----------------------------<  132<H>  4.0   |  27  |  0.88    Ca    8.7      19 Oct 2018 08:46    TPro  5.9<L>  /  Alb  2.3<L>  /  TBili  3.3<H>  /  DBili  1.40<H>  /  AST  36  /  ALT  25  /  AlkPhos  133<H>  10-19    PT/INR - ( 18 Oct 2018 08:59 )   PT: 17.5 sec;   INR: 1.59 ratio         PTT - ( 18 Oct 2018 08:59 )  PTT:35.4 sec      RADIOLOGY & ADDITIONAL TESTS:

## 2018-10-19 NOTE — DISCHARGE NOTE ADULT - PLAN OF CARE
Resolved -EGD exhibited several non-bleeding esophageal ulcers, duodenal ulcers and esophagitis, likely due to PUD and gastritis 2/2 NSAID use  -Continue Omeprazole and Carafate  -Follow up with gastroenterologist for further workup with MRI/MRCP within 1 week Management -Continue with Lactulose, Ursodiol and Rifaximin  -Follow up with gastroenterologist and PMD within 1 week Treated with antibiotics  Follow up with PMD for further observation and management -Continue with Lactulose, Ursodiol and Rifaximin  -Follow up with gastroenterologist for further workup with MRI/MRCP within 1 week continue home irbesartan 300mg qd  Follow up with PMD for all further management Continue home insulin regimen  Continue home metformin and Actos  Follow up with PMD and Endocrinologist for further management -EGD exhibited several non-bleeding esophageal ulcers, duodenal ulcers and esophagitis,  due to Motrin/NSAID -PUD and gastritis 2/2 NSAID use  -Continue Omeprazole 2x day,  and Carafate 4x day for 4 weeks, continue Omeprazole as per GI after 4 weeks   - STOP ASA for 7 days.  - STOP NSAID/NO Motrin/Ibuprofen/Advil   -Follow up with gastroenterologist for further workup with MRI liver /MRCP  as out pt. within 1 week H/O high Ammonia level- Continue with Lactulose 2x day ,  Daily Ursodiol and Rifaximin  -Follow up with gastroenterologist and PMD within 1 week High Bili -Continue with Lactulose, Ursodiol and Rifaximin  -Follow up with gastroenterologist for further workup with MRI Liver /MRCP within 1 week STOP Metformin, STOP Pioglitazone  Continue Pre meals Insulin Pre meals 3x day  Continue Toujeo 50 U SC at bed time On Gabapentin 800 mg 4x day On Gabapentin 800 mg 4x day  Follow with Neurology as out pt. -EGD exhibited several non-bleeding esophageal ulcers, duodenal ulcers and esophagitis,  due to Motrin/NSAID -PUD and gastritis 2/2 NSAID use  -Continue Omeprazole 2x day,  and Carafate 4x day for 4 weeks, continue Omeprazole  daily as per GI after 4 weeks   - STOP ASA for 7 days.  - STOP NSAID/NO Motrin/Ibuprofen/Advil   -Follow up with gastroenterologist for further workup with MRI liver /MRCP  as out pt. within 1 week H/O high Ammonia level- Continue with Lactulose 4 x day ,  Daily Ursodiol and Rifaximin  -Follow up with gastroenterologist and PMD within 1 week Cleared , Repeat Blood culture -NEG  -Treated with antibiotics  Follow up with PMD for further observation and management High Bili -Continue with Lactulose, Ursodiol and Rifaximin, on Vit K  -Follow up with gastroenterologist for further workup with MRI Liver /MRCP within 1 week to evaluate -EGD exhibited several non-bleeding esophageal ulcers, duodenal ulcers and esophagitis, due to Motrin/NSAID -PUD and gastritis 2/2 NSAID use  -Continue Omeprazole 2x day, and Carafate 4x day for 4 weeks, continue Omeprazole  daily as per GI after 4 weeks   - STOP ASA for 7 days.  - STOP NSAID/NO Motrin/Ibuprofen/Advil   -Follow up with gastroenterologist for further workup with MRI liver /MRCP  as out pt. within 1 week  - Out pt Colonoscopy with GI Dr Morales- Rectal wall thickening H/O high Ammonia level- Continue with Lactulose 4 x day ,  Daily Ursodiol and Rifaximin  -Follow up with gastroenterologist on This week Friday at 11:30 AM  -DO NOT drive until cleared by DR Morales   - Follow with PMD within 1 week Cleared , Repeat Blood culture -NEG  -Treated with IV  antibiotics  - Continue Cipro 500 md Q 12 hrs for 5 days  - Continue Flagyl 500 mg 2x day for 5 days  -Probiotics for 2 weeks    Follow up with PMD for CBC, CMP High Bili -improved , Likely Etiology 2/2 Liver disease  -Continue with Lactulose, Ursodiol and Rifaximin, on Vit K  -Follow up with gastroenterologist for further workup with MRI Liver /MRCP within 1 week continue home irbesartan 300 mg qd  Follow up with PMD for all further management

## 2018-10-19 NOTE — PROGRESS NOTE ADULT - PROBLEM SELECTOR PLAN 4
bld cxs- growing gram variable rods  ?etiology  cont abx  id eval  f/u repeat cxs  going for mrcp today  will follow

## 2018-10-19 NOTE — DISCHARGE NOTE ADULT - CARE PLAN
Principal Discharge DX:	Abdominal pain  Goal:	Resolved  Assessment and plan of treatment:	-EGD exhibited several non-bleeding esophageal ulcers, duodenal ulcers and esophagitis, likely due to PUD and gastritis 2/2 NSAID use  -Continue Omeprazole and Carafate  -Follow up with gastroenterologist for further workup with MRI/MRCP within 1 week  Secondary Diagnosis:	Hepatic encephalopathy  Goal:	Management  Assessment and plan of treatment:	-Continue with Lactulose, Ursodiol and Rifaximin  -Follow up with gastroenterologist and PMD within 1 week  Secondary Diagnosis:	Bacteremia  Goal:	Resolved  Assessment and plan of treatment:	Treated with antibiotics  Follow up with PMD for further observation and management  Secondary Diagnosis:	Fatty liver disease, nonalcoholic  Goal:	Management  Assessment and plan of treatment:	-Continue with Lactulose, Ursodiol and Rifaximin  -Follow up with gastroenterologist for further workup with MRI/MRCP within 1 week  Secondary Diagnosis:	Hypertension  Goal:	Management  Assessment and plan of treatment:	continue home irbesartan 300mg qd  Follow up with PMD for all further management  Secondary Diagnosis:	Biliary cirrhosis  Goal:	Management  Assessment and plan of treatment:	-Continue with Lactulose, Ursodiol and Rifaximin  -Follow up with gastroenterologist for further workup with MRI/MRCP within 1 week  Secondary Diagnosis:	Diabetes mellitus type 2, uncontrolled  Goal:	Management  Assessment and plan of treatment:	Continue home insulin regimen  Continue home metformin and Actos  Follow up with PMD and Endocrinologist for further management Principal Discharge DX:	Abdominal pain  Goal:	Resolved  Assessment and plan of treatment:	-EGD exhibited several non-bleeding esophageal ulcers, duodenal ulcers and esophagitis,  due to Motrin/NSAID -PUD and gastritis 2/2 NSAID use  -Continue Omeprazole 2x day,  and Carafate 4x day for 4 weeks, continue Omeprazole as per GI after 4 weeks   - STOP ASA for 7 days.  - STOP NSAID/NO Motrin/Ibuprofen/Advil   -Follow up with gastroenterologist for further workup with MRI liver /MRCP  as out pt. within 1 week  Secondary Diagnosis:	Hepatic encephalopathy  Goal:	Management  Assessment and plan of treatment:	H/O high Ammonia level- Continue with Lactulose 2x day ,  Daily Ursodiol and Rifaximin  -Follow up with gastroenterologist and PMD within 1 week  Secondary Diagnosis:	Bacteremia  Goal:	Resolved  Assessment and plan of treatment:	Treated with antibiotics  Follow up with PMD for further observation and management  Secondary Diagnosis:	Fatty liver disease, nonalcoholic  Goal:	Management  Assessment and plan of treatment:	High Bili -Continue with Lactulose, Ursodiol and Rifaximin  -Follow up with gastroenterologist for further workup with MRI Liver /MRCP within 1 week  Secondary Diagnosis:	Hypertension  Goal:	Management  Assessment and plan of treatment:	continue home irbesartan 300mg qd  Follow up with PMD for all further management  Secondary Diagnosis:	Diabetes mellitus type 2, uncontrolled  Goal:	Management  Assessment and plan of treatment:	STOP Metformin, STOP Pioglitazone  Continue Pre meals Insulin Pre meals 3x day  Continue Toujeo 50 U SC at bed time  Secondary Diagnosis:	Neuropathy  Goal:	Management  Assessment and plan of treatment:	On Gabapentin 800 mg 4x day Principal Discharge DX:	Abdominal pain  Goal:	Resolved  Assessment and plan of treatment:	-EGD exhibited several non-bleeding esophageal ulcers, duodenal ulcers and esophagitis,  due to Motrin/NSAID -PUD and gastritis 2/2 NSAID use  -Continue Omeprazole 2x day,  and Carafate 4x day for 4 weeks, continue Omeprazole  daily as per GI after 4 weeks   - STOP ASA for 7 days.  - STOP NSAID/NO Motrin/Ibuprofen/Advil   -Follow up with gastroenterologist for further workup with MRI liver /MRCP  as out pt. within 1 week  Secondary Diagnosis:	Hepatic encephalopathy  Goal:	Management  Assessment and plan of treatment:	H/O high Ammonia level- Continue with Lactulose 4 x day ,  Daily Ursodiol and Rifaximin  -Follow up with gastroenterologist and PMD within 1 week  Secondary Diagnosis:	Bacteremia  Goal:	Resolved  Assessment and plan of treatment:	Cleared , Repeat Blood culture -NEG  -Treated with antibiotics  Follow up with PMD for further observation and management  Secondary Diagnosis:	Fatty liver disease, nonalcoholic  Goal:	Management  Assessment and plan of treatment:	High Bili -Continue with Lactulose, Ursodiol and Rifaximin, on Vit K  -Follow up with gastroenterologist for further workup with MRI Liver /MRCP within 1 week to evaluate  Secondary Diagnosis:	Hypertension  Goal:	Management  Assessment and plan of treatment:	continue home irbesartan 300mg qd  Follow up with PMD for all further management  Secondary Diagnosis:	Diabetes mellitus type 2, uncontrolled  Goal:	Management  Assessment and plan of treatment:	STOP Metformin, STOP Pioglitazone  Continue Pre meals Insulin Pre meals 3x day  Continue Toujeo 50 U SC at bed time  Secondary Diagnosis:	Neuropathy  Goal:	Management  Assessment and plan of treatment:	On Gabapentin 800 mg 4x day  Follow with Neurology as out pt. Principal Discharge DX:	Abdominal pain  Goal:	Resolved  Assessment and plan of treatment:	-EGD exhibited several non-bleeding esophageal ulcers, duodenal ulcers and esophagitis, due to Motrin/NSAID -PUD and gastritis 2/2 NSAID use  -Continue Omeprazole 2x day, and Carafate 4x day for 4 weeks, continue Omeprazole  daily as per GI after 4 weeks   - STOP ASA for 7 days.  - STOP NSAID/NO Motrin/Ibuprofen/Advil   -Follow up with gastroenterologist for further workup with MRI liver /MRCP  as out pt. within 1 week  - Out pt Colonoscopy with GI Dr Morales- Rectal wall thickening  Secondary Diagnosis:	Hepatic encephalopathy  Goal:	Management  Assessment and plan of treatment:	H/O high Ammonia level- Continue with Lactulose 4 x day ,  Daily Ursodiol and Rifaximin  -Follow up with gastroenterologist on This week Friday at 11:30 AM  -DO NOT drive until cleared by DR Morales   - Follow with PMD within 1 week  Secondary Diagnosis:	Bacteremia  Goal:	Resolved  Assessment and plan of treatment:	Cleared , Repeat Blood culture -NEG  -Treated with IV  antibiotics  - Continue Cipro 500 md Q 12 hrs for 5 days  - Continue Flagyl 500 mg 2x day for 5 days  -Probiotics for 2 weeks    Follow up with PMD for CBC, CMP  Secondary Diagnosis:	Fatty liver disease, nonalcoholic  Goal:	Management  Assessment and plan of treatment:	High Bili -improved , Likely Etiology 2/2 Liver disease  -Continue with Lactulose, Ursodiol and Rifaximin, on Vit K  -Follow up with gastroenterologist for further workup with MRI Liver /MRCP within 1 week  Secondary Diagnosis:	Hypertension  Goal:	Management  Assessment and plan of treatment:	continue home irbesartan 300 mg qd  Follow up with PMD for all further management  Secondary Diagnosis:	Diabetes mellitus type 2, uncontrolled  Goal:	Management  Assessment and plan of treatment:	STOP Metformin, STOP Pioglitazone  Continue Pre meals Insulin Pre meals 3x day  Continue Toujeo 50 U SC at bed time  Secondary Diagnosis:	Neuropathy  Goal:	Management  Assessment and plan of treatment:	On Gabapentin 800 mg 4x day  Follow with Neurology as out pt.

## 2018-10-19 NOTE — PROGRESS NOTE ADULT - PROBLEM SELECTOR PLAN 2
- currently asymptomatic, AAOx3. pt with hx of HE on last admission, likely 2/2 to NAFLD.  - elevated ammonia levels on admission, chronically elevated per patient  - continue home lactulose. Start rifaximin 550mg BID as per GI (Dr. Morales)  -Trend daily labs - currently asymptomatic, AAOx3. pt with hx of HE on last admission, likely 2/2 to NAFLD.  - elevated ammonia levels 69 today, chronically elevated per patient  -GI (Dr. Franks team): US 2/2018 noted, with liver lab w/u at that time, neg with exception of +asca/tammi; cont ppi, sandra, rifaximin bid  cont lactulose bid, titrate for 3-4 soft bms/day  trend ammonia levels, trend   -MRCP/ liver MRI unable to be performed 2/2 patient body habitus; will follow up GI outpatient on d/c  -Trend daily labs -Blood cx 10/17 growing gram variable rods. F/u 10/19 blood cultures. AFEBRILE NO LUEKOCYTOSIS.  -Started on zosyn  -ID dr. Rios called, awaiting reccs  -Monitor

## 2018-10-19 NOTE — DISCHARGE NOTE ADULT - PATIENT PORTAL LINK FT
You can access the [facility] Patient Portal, offered by [facility], by registering with the following website: [email or web address]

## 2018-10-19 NOTE — DISCHARGE NOTE ADULT - SECONDARY DIAGNOSIS.
Hepatic encephalopathy Bacteremia Fatty liver disease, nonalcoholic Hypertension Biliary cirrhosis Diabetes mellitus type 2, uncontrolled Neuropathy

## 2018-10-19 NOTE — PROGRESS NOTE ADULT - PROBLEM SELECTOR PLAN 4
- chronic, stable  - continue home irbesartan 300mg qd with hold parameters - chronic, stable   -elevated Bili -?? Etiology  - pt follows GI (Dr. Morales) outpt.  GI team called, reccs:US 2/2018 noted, with liver lab w/u at that time, neg with exception of +asca/tammi; cont ppi, sandra, rifaximin bid  cont lactulose bid, titrate for 3-4 soft bms/day  trend ammonia levels, trend   - Lasix being held  -MRCP/ liver MRI unable to be performed 2/2 patient body habitus; will follow up GI outpatient on d/c  - continue to monitor Uncontrolled  A1c 10.2- holding home medications: pioglitazone, metformin, toujeo,   - Endocrine Dr. C PErlman:cont lantus 50 units daily  cont Humalog 10 units tid before meals plus scale coverage  cont cons cho diet  goal bg 100-180 in hosp setting.

## 2018-10-19 NOTE — PROGRESS NOTE ADULT - PROBLEM SELECTOR PLAN 5
- chronic, Unclear Etiology  stable, High Bili   - GI following Dr Morales D/W Plan for MRI -Liver with IV Contrast, MRCP NON contrast in AM  - continue ursodiol 300mg TID - chronic, stable  - continue home irbesartan 300mg qd with hold parameters - currently asymptomatic, AAOx3. pt with hx of HE on last admission, likely 2/2 to NAFLD.  - elevated ammonia levels 69 today, chronically elevated per patient  -GI (Dr. Franks team): US 2/2018 noted, with liver lab w/u at that time, neg with exception of +asca/tammi; cont ppi, sandra, rifaximin bid  cont lactulose bid, titrate for 3-4 soft bms/day  trend ammonia levels, trend   -MRCP/ liver MRI unable to be performed 2/2 patient body habitus; will follow up GI outpatient on d/c  -Trend daily labs

## 2018-10-19 NOTE — PROGRESS NOTE ADULT - PROBLEM SELECTOR PLAN 7
- 2/2 type 2 diabetes  - pt follows pods (Dr. Arroyo) as output  - continue home Gabapentin 800 mg QID - holding home medications: pioglitazone, metformin, toujeo, Humalog  - Endocrine Dr. C PErlman:cont lantus 50 units daily  cont humalog 10 units tid before meals plus scale coverage  cont cons cho diet  goal bg 100-180 in hosp setting. - chronic, Unclear Etiology  stable, High Bili   - GI following Dr Morales D/W Plan for MRI -Liver with IV Contrast, MRCP NON contrast in AM..UNABLE TO BE PERFORMED 2/2 BODY HABITUS. Will f/u outpt  US 2/2018 noted, with liver lab w/u at that time, neg with exception of +asca/tammi; cont ppi, sandra, rifaximin bid  cont lactulose bid, titrate for 3-4 soft bms/day  trend ammonia levels, trend

## 2018-10-19 NOTE — PROGRESS NOTE ADULT - SUBJECTIVE AND OBJECTIVE BOX
Patient is a 62y old  Male who presents with a chief complaint of abdominal pain (19 Oct 2018 09:26)      INTERVAL HPI:  Pt is a 61 y/o M with PMHx of Type 2 DM, HTN, HLD, obesity, nephrolithiasis (last stone 25 years ago), neuropathy, HE, NAFLD, who presents with abdominal pain and vomiting for the past two days. Pt states he has had a decreased appetite since Sunday evening along with nonradiating intermittent epigastric abd pain rated 7/10. Yesterday he felt nauseous and vomited greenish fluid once. Today, he vomited 3 more times in the morning and went to Dr. Morales's office, who recommended pt get bloodwork and have a CT Abd/Pelvis. Pt states after leaving office, he started feeling nauseous and vomited once more before coming into ED. Admits epigastric abd pain, nausea, vomiting. Denies HA, dizziness, CP, SOB, diarrhea, or bloody stools. Denies recent travel or sick contacts.    In the ED, VS: T 98.4, HR 98, /77, RR 17, sat 99% RA, WBC 5.94, H/H 12.5/42.2, plat 108, Lactate 4.1, ammonia 73, Cl 112, CO2 21, BUN 13, Cr 0.71, Glu 189, Ca 7.5, prot 5.5, alb 2.1, bili 4.0, alk phos 165,AST 29, ALT 22, amylase 20, lipase 20, CT abd/pelvis - Impression may be associated with duodenitis and/or peptic ulcer disease; recommend further clinical correlation. 4 mm nonobstructing calcification at the lower pole the left kidney. CXR showing no acute pathology. EKG showing mild prolonged Qtc at 480.    OVERNIGHT EVENTS: NONE.    10/18/2018: Pt seen, examined. Continues to endorse epigastric, dull constant abdominal pain slightly improved. Also endorsing loose nonbloody BM x1 this AM. Denies nausea/vomiting, CP, SOB, urinary symptoms, melena, hematochezia. Awaiting EGD. Zofran held as Qtc 480. NPO for EGD, On Lactulose, drop in H/H    10/19/18: Pt seen, examined. Epigastric, dull constant abd pain improving. S/p EGD showing       Home Medications:  Aspir 81 oral delayed release tablet: 1 tab(s) orally once a day (17 Oct 2018 20:22)  furosemide 20 mg oral tablet: 1 tab(s) orally once a day (17 Oct 2018 20:22)  gabapentin 800 mg oral tablet: orally 4 times a day (17 Oct 2018 20:22)  HumaLOG 100 units/mL subcutaneous solution: 20 unit(s) subcutaneous 2 times a day(breakfast and lunch) (17 Oct 2018 20:22)  HumaLOG 100 units/mL subcutaneous solution: 30 unit(s) subcutaneous once a day(@dinner) (17 Oct 2018 20:22)  irbesartan 300 mg oral tablet: 1 tab(s) orally once a day (17 Oct 2018 20:22)  metFORMIN 1000 mg oral tablet: 1 tab(s) orally 2 times a day (17 Oct 2018 20:22)  omeprazole 20 mg oral delayed release capsule: 1 cap(s) orally once a day (17 Oct 2018 20:22)  phytonadione 100 mcg oral tablet: 1 tab(s) orally once a day (17 Oct 2018 20:22)  pioglitazone 30 mg oral tablet: 1 tab(s) orally once a day (17 Oct 2018 20:22)  potassium chloride 10 mEq oral tablet, extended release: 1 tab(s) orally once a day (17 Oct 2018 20:22)  pravastatin 40 mg oral tablet: 1 tab(s) orally once a day (17 Oct 2018 20:22)  Toujeo SoloStar: 50 unit(s) subcutaneous once a day (at bedtime) (17 Oct 2018 20:22)  ursodiol 300 mg oral capsule: 1 cap(s) orally 3 times a day (17 Oct 2018 20:22)  Vitamin D3 2000 intl units oral tablet: 1 tab(s) orally once a day (17 Oct 2018 20:22)      MEDICATIONS  (STANDING):  dextrose 5%. 1000 milliLiter(s) (50 mL/Hr) IV Continuous <Continuous>  dextrose 50% Injectable 12.5 Gram(s) IV Push once  dextrose 50% Injectable 25 Gram(s) IV Push once  dextrose 50% Injectable 25 Gram(s) IV Push once  gabapentin 800 milliGRAM(s) Oral four times a day  insulin glargine Injectable (LANTUS) 50 Unit(s) SubCutaneous at bedtime  insulin lispro (HumaLOG) corrective regimen sliding scale   SubCutaneous three times a day before meals  insulin lispro Injectable (HumaLOG) 10 Unit(s) SubCutaneous three times a day before meals  lactulose Syrup 30 Gram(s) Oral two times a day  losartan 100 milliGRAM(s) Oral daily  pantoprazole  Injectable 40 milliGRAM(s) IV Push every 12 hours  piperacillin/tazobactam IVPB. 3.375 Gram(s) IV Intermittent every 8 hours  rifaximin 550 milliGRAM(s) Oral two times a day  sodium chloride 0.9%. 1000 milliLiter(s) (75 mL/Hr) IV Continuous <Continuous>  sucralfate suspension 1 Gram(s) Oral two times a day  ursodiol Capsule 300 milliGRAM(s) Oral three times a day    MEDICATIONS  (PRN):  dextrose 40% Gel 15 Gram(s) Oral once PRN Blood Glucose LESS THAN 70 milliGRAM(s)/deciliter  glucagon  Injectable 1 milliGRAM(s) IntraMuscular once PRN Glucose LESS THAN 70 milligrams/deciliter      Allergies    codeine (Anaphylaxis)    Intolerances        REVIEW OF SYSTEMS:  CONSTITUTIONAL: No fever, No chills, No fatigue, No myalgia, No Body ache, No Weakness  EYES: No eye pain,  No visual disturbances, No discharge, NO Redness  ENMT:  No ear pain, No nose bleed, No vertigo; No sinus or throat pain, No Congestion  NECK: No pain, No stiffness  RESPIRATORY: No cough, wheezing, No  hemoptysis, No shortness of breath  CARDIOVASCULAR: No chest pain, palpitations  GASTROINTESTINAL: No abdominal or epigastric pain. No nausea, No vomiting; No diarrhea or constipation. [  ] BM  GENITOURINARY: No dysuria, No frequency, No urgency, No hematuria, or incontinence  NEUROLOGICAL: No headaches, No dizziness, No numbness, No tingling, No tremors, No weakness  EXT: No Swelling, No Pain, No Edema  SKIN:  [  ] No itching, burning, rashes, or lesions   MUSCULOSKELETAL: No joint pain or swelling; No muscle pain, No back pain, No extremity pain  PSYCHIATRIC: No depression, anxiety, mood swings or difficulty sleeping at night  PAIN SCALE: [  ] None  [  ] Other-  ROS Unable to obtain due to - [  ] Dementia  [  ] Lethargy  [  ] Sedated [  ] non verbal  REST OF REVIEW Of SYSTEM - [  ] Normal     Vital Signs Last 24 Hrs  T(C): 36.7 (19 Oct 2018 05:18), Max: 37.1 (18 Oct 2018 16:58)  T(F): 98.1 (19 Oct 2018 05:18), Max: 98.7 (18 Oct 2018 16:58)  HR: 75 (19 Oct 2018 05:18) (75 - 92)  BP: 129/68 (19 Oct 2018 05:18) (108/69 - 129/68)  BP(mean): --  RR: 18 (19 Oct 2018 05:18) (17 - 18)  SpO2: 95% (19 Oct 2018 05:18) (94% - 98%)  Finger Stick        10-18 @ 07:01  -  10-19 @ 07:00  --------------------------------------------------------  IN: 750 mL / OUT: 0 mL / NET: 750 mL        PHYSICAL EXAM:  GENERAL:  [  ] NAD , [  ] well appearing, [  ] Agitated, [  ] Drowsy,  [  ] Lethargy, [  ] confused   HEAD:  [  ] Normal, [  ] Other  EYES:  [  ] EOMI, [  ] PERRLA, [  ] conjunctiva and sclera clear normal, [  ] Other,  [  ] Pallor,[  ] Discharge  ENMT:  [  ] Normal, [  ] Moist mucous membranes, [  ] Good dentition, [  ] No Thrush  NECK:  [  ] Supple, [  ] No JVD, [  ] Normal thyroid, [  ] Lymphadenopathy [  ] Other  CHEST/LUNG:  [  ] Clear to auscultation bilaterally, [  ] Breath Sounds equal OR Decrease,  [  ] No rales, [  ] No rhonchi  [  ]  No wheezing  HEART:  [  ] Regular rate and rhythm, [  ] tachycardia, [  ] Bradycardia,  [  ] irregular  [  ] No murmurs, No rubs, No gallops, [  ] PPM in place (Mfr:  )  ABDOMEN:  [  ] Soft, [  ] Nontender, [  ] Nondistended, [  ] No mass, [  ] Bowel sounds present, [  ] obese  NERVOUS SYSTEM:  [  ] Alert & Oriented X3, [  ] Nonfocal  [  ] Confusion  [  ] Encephalopathic [  ] Sedated [  ] Unable to assess, [  ] Other-  EXTREMITIES: [  ] 2+ Peripheral Pulses, No clubbing, No cyanosis,  [  ] edema B/L lower EXT. [  ] PVD stasis skin changes B/L Lower EXT  LYMPH: No lymphadenopathy noted  SKIN:  [  ] No rashes or lesions, [  ] Pressure Ulcers, [  ] ecchymosis, [  ] Skin Tears, [  ] Other    DIET:     LABS:                        12.3   3.97  )-----------( 84       ( 19 Oct 2018 08:46 )             36.2     19 Oct 2018 08:46    141    |  107    |  11     ----------------------------<  132    4.0     |  27     |  0.88     Ca    8.7        19 Oct 2018 08:46    TPro  5.9    /  Alb  2.3    /  TBili  3.3    /  DBili  1.40   /  AST  36     /  ALT  25     /  AlkPhos  133    19 Oct 2018 08:46    PT/INR - ( 18 Oct 2018 08:59 )   PT: 17.5 sec;   INR: 1.59 ratio         PTT - ( 18 Oct 2018 08:59 )  PTT:35.4 sec      Culture Results:   No growth to date. (10-17 @ 18:43)  Culture Results:   Growth in anaerobic bottle: Gram Variable Rods (10-17 @ 18:43)      culture blood  -- .Blood Blood 10-17 @ 18:43    culture urine  --  10-17 @ 18:43              Culture - Blood (collected 17 Oct 2018 18:43)  Source: .Blood Blood  Gram Stain (18 Oct 2018 23:09):    Growth in anaerobic bottle: Gram Variable Rods  Preliminary Report (18 Oct 2018 23:10):    Growth in anaerobic bottle: Gram Variable Rods    Culture - Blood (collected 17 Oct 2018 18:43)  Source: .Blood Blood  Preliminary Report (18 Oct 2018 19:01):    No growth to date.         Anemia Panel:      Thyroid Panel:        Amylase, Serum Total: 20 U/L (10-17-18 @ 15:11)  Lipase, Serum: 54 U/L (10-17-18 @ 15:11)          RADIOLOGY & ADDITIONAL TESTS:  New imaging reviewed    HEALTH ISSUES - PROBLEM Dx:  Diabetes mellitus type 2, uncontrolled: Diabetes mellitus type 2, uncontrolled  Need for prophylactic measure: Need for prophylactic measure  Hypercholesteremia: Hypercholesteremia  Hepatic encephalopathy: Hepatic encephalopathy  Biliary cirrhosis: Biliary cirrhosis  Fatty liver disease, nonalcoholic: Fatty liver disease, nonalcoholic  Neuropathy: Neuropathy  Type 2 diabetes mellitus: Type 2 diabetes mellitus  Abdominal pain: Abdominal pain  Obesity: Obesity  Diabetes: Diabetes  Hypertension: Hypertension  Stone in kidney: Stone in kidney          Consultant(s) Notes Reviewed:  [  ] YES     Care Discussed with [X] Consultants  [  ] Patient  [  ] Family  [  ]   [  ] Social Service  [  ] RN, [  ] Physical Therapy  DVT PPX: [  ] Lovenox, [  ] S C Heparin, [  ] Coumadin, [  ] Xarelto, [  ] Eliquis, [  ] Pradaxa, [  ] IV Heparin drip, [  ] SCD [  ] Contraindication 2 to GI Bleed,[  ] Ambulation  Advanced directive: [  ] None, [  ] DNR/DNI Patient is a 62y old  Male who presents with a chief complaint of abdominal pain (19 Oct 2018 09:26)      INTERVAL HPI:  Pt is a 63 y/o M with PMHx of Type 2 DM, HTN, HLD, obesity, nephrolithiasis (last stone 25 years ago), neuropathy, HE, NAFLD, who presents with abdominal pain and vomiting for the past two days. Pt states he has had a decreased appetite since Sunday evening along with nonradiating intermittent epigastric abd pain rated 7/10. Yesterday he felt nauseous and vomited greenish fluid once. Today, he vomited 3 more times in the morning and went to Dr. Morales's office, who recommended pt get bloodwork and have a CT Abd/Pelvis. Pt states after leaving office, he started feeling nauseous and vomited once more before coming into ED. Admits epigastric abd pain, nausea, vomiting. Denies HA, dizziness, CP, SOB, diarrhea, or bloody stools. Denies recent travel or sick contacts.    In the ED, VS: T 98.4, HR 98, /77, RR 17, sat 99% RA, WBC 5.94, H/H 12.5/42.2, plat 108, Lactate 4.1, ammonia 73, Cl 112, CO2 21, BUN 13, Cr 0.71, Glu 189, Ca 7.5, prot 5.5, alb 2.1, bili 4.0, alk phos 165,AST 29, ALT 22, amylase 20, lipase 20, CT abd/pelvis - Impression may be associated with duodenitis and/or peptic ulcer disease; recommend further clinical correlation. 4 mm nonobstructing calcification at the lower pole the left kidney. CXR showing no acute pathology. EKG showing mild prolonged Qtc at 480.    OVERNIGHT EVENTS: NONE.    10/18/2018: Pt seen, examined. Continues to endorse epigastric, dull constant abdominal pain slightly improved. Also endorsing loose nonbloody BM x1 this AM. Denies nausea/vomiting, CP, SOB, urinary symptoms, melena, hematochezia. Awaiting EGD. Zofran held as Qtc 480. NPO for EGD, On Lactulose, drop in H/H    10/19/18: Overnight ++blood cx gram variable rods, blood cx sent, started on zosyn. ID Dr. Chinchilla called. EGD done, showing non bleeding esophageal ulcers, esophagitis, non bleeding duodenal ulcers, no EV/GV. Pt seen, examined. Denying abdominal pain, nausea/vomiting, diarrhea. No fevers, chills, night sweats. NO other complaints, per pt he could not have MRI abd/ MRCP due to his body habitus.      Home Medications:  Aspir 81 oral delayed release tablet: 1 tab(s) orally once a day (17 Oct 2018 20:22)  furosemide 20 mg oral tablet: 1 tab(s) orally once a day (17 Oct 2018 20:22)  gabapentin 800 mg oral tablet: orally 4 times a day (17 Oct 2018 20:22)  HumaLOG 100 units/mL subcutaneous solution: 20 unit(s) subcutaneous 2 times a day(breakfast and lunch) (17 Oct 2018 20:22)  HumaLOG 100 units/mL subcutaneous solution: 30 unit(s) subcutaneous once a day(@dinner) (17 Oct 2018 20:22)  irbesartan 300 mg oral tablet: 1 tab(s) orally once a day (17 Oct 2018 20:22)  metFORMIN 1000 mg oral tablet: 1 tab(s) orally 2 times a day (17 Oct 2018 20:22)  omeprazole 20 mg oral delayed release capsule: 1 cap(s) orally once a day (17 Oct 2018 20:22)  phytonadione 100 mcg oral tablet: 1 tab(s) orally once a day (17 Oct 2018 20:22)  pioglitazone 30 mg oral tablet: 1 tab(s) orally once a day (17 Oct 2018 20:22)  potassium chloride 10 mEq oral tablet, extended release: 1 tab(s) orally once a day (17 Oct 2018 20:22)  pravastatin 40 mg oral tablet: 1 tab(s) orally once a day (17 Oct 2018 20:22)  Toujeo SoloStar: 50 unit(s) subcutaneous once a day (at bedtime) (17 Oct 2018 20:22)  ursodiol 300 mg oral capsule: 1 cap(s) orally 3 times a day (17 Oct 2018 20:22)  Vitamin D3 2000 intl units oral tablet: 1 tab(s) orally once a day (17 Oct 2018 20:22)    MEDICATIONS  (STANDING):  dextrose 5%. 1000 milliLiter(s) (50 mL/Hr) IV Continuous <Continuous>  dextrose 50% Injectable 12.5 Gram(s) IV Push once  dextrose 50% Injectable 25 Gram(s) IV Push once  dextrose 50% Injectable 25 Gram(s) IV Push once  gabapentin 800 milliGRAM(s) Oral four times a day  insulin glargine Injectable (LANTUS) 50 Unit(s) SubCutaneous at bedtime  insulin lispro (HumaLOG) corrective regimen sliding scale   SubCutaneous three times a day before meals  insulin lispro Injectable (HumaLOG) 10 Unit(s) SubCutaneous three times a day before meals  lactulose Syrup 30 Gram(s) Oral two times a day  losartan 100 milliGRAM(s) Oral daily  pantoprazole    Tablet 40 milliGRAM(s) Oral two times a day  piperacillin/tazobactam IVPB. 3.375 Gram(s) IV Intermittent every 8 hours  rifaximin 550 milliGRAM(s) Oral two times a day  sodium chloride 0.9%. 1000 milliLiter(s) (75 mL/Hr) IV Continuous <Continuous>  sucralfate 1 Gram(s) Oral every 6 hours  ursodiol Capsule 300 milliGRAM(s) Oral three times a day    MEDICATIONS  (PRN):  dextrose 40% Gel 15 Gram(s) Oral once PRN Blood Glucose LESS THAN 70 milliGRAM(s)/deciliter  glucagon  Injectable 1 milliGRAM(s) IntraMuscular once PRN Glucose LESS THAN 70 milligrams/deciliter        Allergies    codeine (Anaphylaxis)    Intolerances        REVIEW OF SYSTEMS:  CONSTITUTIONAL: No fever, No chills, No fatigue, No myalgia, No Body ache, No Weakness  EYES: No eye pain,  No visual disturbances, No discharge, NO Redness  ENMT:  No ear pain, No nose bleed, No vertigo; No sinus or throat pain, No Congestion  NECK: No pain, No stiffness  RESPIRATORY: No cough, wheezing, No  hemoptysis, No shortness of breath  CARDIOVASCULAR: No chest pain, palpitations  GASTROINTESTINAL: No abdominal or epigastric pain. No nausea, No vomiting; No diarrhea or constipation. [  x] BMx1  GENITOURINARY: No dysuria, No frequency, No urgency, No hematuria, or incontinence  NEUROLOGICAL: No headaches, No dizziness, No numbness, No tingling, No tremors, No weakness  EXT: No Swelling, No Pain, No Edema  SKIN:  [ x ] No itching, burning, rashes, or lesions   MUSCULOSKELETAL: No joint pain or swelling; No muscle pain, No back pain, No extremity pain  PSYCHIATRIC: No depression, anxiety, mood swings or difficulty sleeping at night  PAIN SCALE: [ x ] None  [  ] Other-  ROS Unable to obtain due to - [  ] Dementia  [  ] Lethargy  [  ] Sedated [  ] non verbal  REST OF REVIEW Of SYSTEM - [ x ] Normal     Vital Signs Last 24 Hrs  T(C): 36.7 (19 Oct 2018 05:18), Max: 37.1 (18 Oct 2018 16:58)  T(F): 98.1 (19 Oct 2018 05:18), Max: 98.7 (18 Oct 2018 16:58)  HR: 75 (19 Oct 2018 05:18) (75 - 92)  BP: 129/68 (19 Oct 2018 05:18) (108/69 - 129/68)  BP(mean): --  RR: 18 (19 Oct 2018 05:18) (17 - 18)  SpO2: 95% (19 Oct 2018 05:18) (94% - 98%)  Finger Stick        10-18 @ 07:01  -  10-19 @ 07:00  --------------------------------------------------------  IN: 750 mL / OUT: 0 mL / NET: 750 mL        PHYSICAL EXAM:  GENERAL:  [x  ] NAD , [x  ] well appearing, [  ] Agitated, [  ] Drowsy,  [  ] Lethargy, [  ] confused   HEAD:  [ x ] Normal, [  ] Other  EYES:  [  x] EOMI, [  x] PERRLA, [x  ] conjunctiva and sclera clear normal, [  ] Other,  [  ] Pallor,[  ] Discharge  ENMT:  [ x ] Normal, [x  ] Moist mucous membranes, [  ] Good dentition, [x  ] No Thrush  NECK:  [ x ] Supple, [ x ] No JVD, [ x ] Normal thyroid, [  ] Lymphadenopathy [  ] Other  CHEST/LUNG:  [ x ] Clear to auscultation bilaterally, [  x] Breath Sounds equal,  [ x ] No rales, [x  ] No rhonchi  [ x ]  No wheezing  HEART:  [ x ] Regular rate and rhythm, [  ] tachycardia, [  ] Bradycardia,  [  ] irregular  [x  ] No murmurs, No rubs, No gallops, [  ] PPM in place (Mfr:  )  ABDOMEN:  [x  ] Soft, [  x] Nontender, [ x ] Nondistended, [  ] No mass, [x ] Bowel sounds present, [ x ] obese  NERVOUS SYSTEM:  [x  ] Alert & Oriented X3, [x  ] Nonfocal  [  ] Confusion  [  ] Encephalopathic [  ] Sedated [  ] Unable to assess, [  ] Other-  EXTREMITIES: [ x ] 2+ Peripheral Pulses, No clubbing, No cyanosis,  [  ] edema B/L lower EXT. [  ] PVD stasis skin changes B/L Lower EXT  LYMPH: No lymphadenopathy noted  SKIN:  [ x ] No rashes or lesions, [  ] Pressure Ulcers, [  ] ecchymosis, [  ] Skin Tears, [  ] Other    DIET: DASH/TLC    LABS:                        12.3   3.97  )-----------( 84       ( 19 Oct 2018 08:46 )             36.2     19 Oct 2018 08:46    141    |  107    |  11     ----------------------------<  132    4.0     |  27     |  0.88     Ca    8.7        19 Oct 2018 08:46    TPro  5.9    /  Alb  2.3    /  TBili  3.3    /  DBili  1.40   /  AST  36     /  ALT  25     /  AlkPhos  133    19 Oct 2018 08:46    PT/INR - ( 18 Oct 2018 08:59 )   PT: 17.5 sec;   INR: 1.59 ratio         PTT - ( 18 Oct 2018 08:59 )  PTT:35.4 sec      Culture Results:   No growth to date. (10-17 @ 18:43)  Culture Results:   Growth in anaerobic bottle: Gram Variable Rods (10-17 @ 18:43)      culture blood  -- .Blood Blood 10-17 @ 18:43    culture urine  --  10-17 @ 18:43              Culture - Blood (collected 17 Oct 2018 18:43)  Source: .Blood Blood  Gram Stain (18 Oct 2018 23:09):    Growth in anaerobic bottle: Gram Variable Rods  Preliminary Report (18 Oct 2018 23:10):    Growth in anaerobic bottle: Gram Variable Rods    Culture - Blood (collected 17 Oct 2018 18:43)  Source: .Blood Blood  Preliminary Report (18 Oct 2018 19:01):    No growth to date.         Anemia Panel:      Thyroid Panel:        Amylase, Serum Total: 20 U/L (10-17-18 @ 15:11)  Lipase, Serum: 54 U/L (10-17-18 @ 15:11)          RADIOLOGY & ADDITIONAL TESTS:  New imaging reviewed    HEALTH ISSUES - PROBLEM Dx:  Positive blood culture: Positive blood culture  Diabetes mellitus type 2, uncontrolled: Diabetes mellitus type 2, uncontrolled  Need for prophylactic measure: Need for prophylactic measure  Hypercholesteremia: Hypercholesteremia  Hepatic encephalopathy: Hepatic encephalopathy  Biliary cirrhosis: Biliary cirrhosis  Fatty liver disease, nonalcoholic: Fatty liver disease, nonalcoholic  Neuropathy: Neuropathy  Type 2 diabetes mellitus: Type 2 diabetes mellitus  Abdominal pain: Abdominal pain  Obesity: Obesity  Diabetes: Diabetes  Hypertension: Hypertension  Stone in kidney: Stone in kidney              Consultant(s) Notes Reviewed:  [ x ] YES     Care Discussed with [X] Consultants  [x  ] Patient  [  ] Family  [  ]   [  ] Social Service  [  ] RN, [  ] Physical Therapy  DVT PPX: [  ] Lovenox, [  ] S C Heparin, [  ] Coumadin, [  ] Xarelto, [  ] Eliquis, [  ] Pradaxa, [  ] IV Heparin drip, [ x ] SCD [  ] Contraindication 2 to GI Bleed,[  ] Ambulation  Advanced directive: [  ] None, [  ] DNR/DNI Patient is a 62y old  Male who presents with a chief complaint of abdominal pain (19 Oct 2018 09:26)      INTERVAL HPI:  Pt is a 63 y/o M with PMHx of Type 2 DM, HTN, HLD, obesity, nephrolithiasis (last stone 25 years ago), neuropathy, HE, NAFLD, who presents with abdominal pain and vomiting for the past two days. Pt states he has had a decreased appetite since Sunday evening along with nonradiating intermittent epigastric abd pain rated 7/10. Yesterday he felt nauseous and vomited greenish fluid once. Today, he vomited 3 more times in the morning and went to Dr. Morales's office, who recommended pt get bloodwork and have a CT Abd/Pelvis. Pt states after leaving office, he started feeling nauseous and vomited once more before coming into ED. Admits epigastric abd pain, nausea, vomiting. Denies HA, dizziness, CP, SOB, diarrhea, or bloody stools. Denies recent travel or sick contacts.    In the ED, VS: T 98.4, HR 98, /77, RR 17, sat 99% RA, WBC 5.94, H/H 12.5/42.2, plat 108, Lactate 4.1, ammonia 73, Cl 112, CO2 21, BUN 13, Cr 0.71, Glu 189, Ca 7.5, prot 5.5, alb 2.1, bili 4.0, alk phos 165,AST 29, ALT 22, amylase 20, lipase 20, CT abd/pelvis - Impression may be associated with duodenitis and/or peptic ulcer disease; recommend further clinical correlation. 4 mm nonobstructing calcification at the lower pole the left kidney. CXR showing no acute pathology. EKG showing mild prolonged Qtc at 480.    OVERNIGHT EVENTS: NONE.    10/18/2018: Pt seen, examined. Continues to endorse epigastric, dull constant abdominal pain slightly improved. Also endorsing loose nonbloody BM x1 this AM. Denies nausea/vomiting, CP, SOB, urinary symptoms, melena, hematochezia. Awaiting EGD. Zofran held as Qtc 480. NPO for EGD, On Lactulose, drop in H/H    10/19/18: Overnight ++blood cx gram variable rods, blood cx sent, started on zosyn. ID Dr. REYNOSO called. EGD done, showing non bleeding esophageal ulcers, esophagitis, non bleeding duodenal ulcers, no EV/GV. Pt seen, examined. Denying abdominal pain, nausea/vomiting, diarrhea. No fevers, chills, night sweats. NO other complaints, per pt he could not have MRI abd/ MRCP due to his body habitus.      Home Medications:  Aspir 81 oral delayed release tablet: 1 tab(s) orally once a day (17 Oct 2018 20:22)  furosemide 20 mg oral tablet: 1 tab(s) orally once a day (17 Oct 2018 20:22)  gabapentin 800 mg oral tablet: orally 4 times a day (17 Oct 2018 20:22)  HumaLOG 100 units/mL subcutaneous solution: 20 unit(s) subcutaneous 2 times a day(breakfast and lunch) (17 Oct 2018 20:22)  HumaLOG 100 units/mL subcutaneous solution: 30 unit(s) subcutaneous once a day(@dinner) (17 Oct 2018 20:22)  irbesartan 300 mg oral tablet: 1 tab(s) orally once a day (17 Oct 2018 20:22)  metFORMIN 1000 mg oral tablet: 1 tab(s) orally 2 times a day (17 Oct 2018 20:22)  omeprazole 20 mg oral delayed release capsule: 1 cap(s) orally once a day (17 Oct 2018 20:22)  phytonadione 100 mcg oral tablet: 1 tab(s) orally once a day (17 Oct 2018 20:22)  pioglitazone 30 mg oral tablet: 1 tab(s) orally once a day (17 Oct 2018 20:22)  potassium chloride 10 mEq oral tablet, extended release: 1 tab(s) orally once a day (17 Oct 2018 20:22)  pravastatin 40 mg oral tablet: 1 tab(s) orally once a day (17 Oct 2018 20:22)  Toujeo SoloStar: 50 unit(s) subcutaneous once a day (at bedtime) (17 Oct 2018 20:22)  ursodiol 300 mg oral capsule: 1 cap(s) orally 3 times a day (17 Oct 2018 20:22)  Vitamin D3 2000 intl units oral tablet: 1 tab(s) orally once a day (17 Oct 2018 20:22)    MEDICATIONS  (STANDING):  dextrose 5%. 1000 milliLiter(s) (50 mL/Hr) IV Continuous <Continuous>  dextrose 50% Injectable 12.5 Gram(s) IV Push once  dextrose 50% Injectable 25 Gram(s) IV Push once  dextrose 50% Injectable 25 Gram(s) IV Push once  gabapentin 800 milliGRAM(s) Oral four times a day  insulin glargine Injectable (LANTUS) 50 Unit(s) SubCutaneous at bedtime  insulin lispro (HumaLOG) corrective regimen sliding scale   SubCutaneous three times a day before meals  insulin lispro Injectable (HumaLOG) 10 Unit(s) SubCutaneous three times a day before meals  lactulose Syrup 30 Gram(s) Oral two times a day  losartan 100 milliGRAM(s) Oral daily  pantoprazole    Tablet 40 milliGRAM(s) Oral two times a day  piperacillin/tazobactam IVPB. 3.375 Gram(s) IV Intermittent every 8 hours  rifaximin 550 milliGRAM(s) Oral two times a day  sodium chloride 0.9%. 1000 milliLiter(s) (75 mL/Hr) IV Continuous <Continuous>  sucralfate 1 Gram(s) Oral every 6 hours  ursodiol Capsule 300 milliGRAM(s) Oral three times a day    MEDICATIONS  (PRN):  dextrose 40% Gel 15 Gram(s) Oral once PRN Blood Glucose LESS THAN 70 milliGRAM(s)/deciliter  glucagon  Injectable 1 milliGRAM(s) IntraMuscular once PRN Glucose LESS THAN 70 milligrams/deciliter        Allergies    codeine (Anaphylaxis)    Intolerances        REVIEW OF SYSTEMS:  CONSTITUTIONAL: No fever, No chills, No fatigue, No myalgia, No Body ache, No Weakness  EYES: No eye pain,  No visual disturbances, No discharge, NO Redness  ENMT:  No ear pain, No nose bleed, No vertigo; No sinus or throat pain, No Congestion  NECK: No pain, No stiffness  RESPIRATORY: No cough, wheezing, No  hemoptysis, No shortness of breath  CARDIOVASCULAR: No chest pain, palpitations  GASTROINTESTINAL: No abdominal or epigastric pain. No nausea, No vomiting; No diarrhea or constipation. [  x] BMx1  GENITOURINARY: No dysuria, No frequency, No urgency, No hematuria, or incontinence  NEUROLOGICAL: No headaches, No dizziness, No numbness, No tingling, No tremors, No weakness  EXT: No Swelling, No Pain, No Edema  SKIN:  [ x ] No itching, burning, rashes, or lesions   MUSCULOSKELETAL: No joint pain or swelling; No muscle pain, No back pain, No extremity pain  PSYCHIATRIC: No depression, anxiety, mood swings or difficulty sleeping at night  PAIN SCALE: [ x ] None  [  ] Other-  ROS Unable to obtain due to - [  ] Dementia  [  ] Lethargy  [  ] Sedated [  ] non verbal  REST OF REVIEW Of SYSTEM - [ x ] Normal     Vital Signs Last 24 Hrs  T(C): 36.7 (19 Oct 2018 05:18), Max: 37.1 (18 Oct 2018 16:58)  T(F): 98.1 (19 Oct 2018 05:18), Max: 98.7 (18 Oct 2018 16:58)  HR: 75 (19 Oct 2018 05:18) (75 - 92)  BP: 129/68 (19 Oct 2018 05:18) (108/69 - 129/68)  BP(mean): --  RR: 18 (19 Oct 2018 05:18) (17 - 18)  SpO2: 95% (19 Oct 2018 05:18) (94% - 98%)  Finger Stick        10-18 @ 07:01  -  10-19 @ 07:00  --------------------------------------------------------  IN: 750 mL / OUT: 0 mL / NET: 750 mL        PHYSICAL EXAM:  GENERAL:  [x  ] NAD , [x  ] well appearing, [  ] Agitated, [  ] Drowsy,  [  ] Lethargy, [  ] confused   HEAD:  [ x ] Normal, [  ] Other  EYES:  [  x] EOMI, [  x] PERRLA, [x  ] conjunctiva and sclera clear normal, [  ] Other,  [  ] Pallor,[  ] Discharge  ENMT:  [ x ] Normal, [x  ] Moist mucous membranes, [  ] Good dentition, [x  ] No Thrush  NECK:  [ x ] Supple, [ x ] No JVD, [ x ] Normal thyroid, [  ] Lymphadenopathy [  ] Other  CHEST/LUNG:  [ x ] Clear to auscultation bilaterally, [  x] Breath Sounds equal,  [ x ] No rales, [x  ] No rhonchi  [ x ]  No wheezing  HEART:  [ x ] Regular rate and rhythm, [  ] tachycardia, [  ] Bradycardia,  [  ] irregular  [x  ] No murmurs, No rubs, No gallops, [  ] PPM in place (Mfr:  )  ABDOMEN:  [x  ] Soft, [  x] Nontender, [ x ] Nondistended, [  ] No mass, [x ] Bowel sounds present, [ x ] obese  NERVOUS SYSTEM:  [x  ] Alert & Oriented X3, [x  ] Nonfocal  [  ] Confusion  [  ] Encephalopathic [  ] Sedated [  ] Unable to assess, [  ] Other-  EXTREMITIES: [ x ] 2+ Peripheral Pulses, No clubbing, No cyanosis,  [  ] edema B/L lower EXT. [  ] PVD stasis skin changes B/L Lower EXT  LYMPH: No lymphadenopathy noted  SKIN:  [ x ] No rashes or lesions, [  ] Pressure Ulcers, [  ] ecchymosis, [  ] Skin Tears, [  ] Other    DIET: DASH/TLC    LABS:                        12.3   3.97  )-----------( 84       ( 19 Oct 2018 08:46 )             36.2     19 Oct 2018 08:46    141    |  107    |  11     ----------------------------<  132    4.0     |  27     |  0.88     Ca    8.7        19 Oct 2018 08:46    TPro  5.9    /  Alb  2.3    /  TBili  3.3    /  DBili  1.40   /  AST  36     /  ALT  25     /  AlkPhos  133    19 Oct 2018 08:46    PT/INR - ( 18 Oct 2018 08:59 )   PT: 17.5 sec;   INR: 1.59 ratio         PTT - ( 18 Oct 2018 08:59 )  PTT:35.4 sec      Culture Results:   No growth to date. (10-17 @ 18:43)  Culture Results:   Growth in anaerobic bottle: Gram Variable Rods (10-17 @ 18:43)      culture blood  -- .Blood Blood 10-17 @ 18:43    culture urine  --  10-17 @ 18:43              Culture - Blood (collected 17 Oct 2018 18:43)  Source: .Blood Blood  Gram Stain (18 Oct 2018 23:09):    Growth in anaerobic bottle: Gram Variable Rods  Preliminary Report (18 Oct 2018 23:10):    Growth in anaerobic bottle: Gram Variable Rods    Culture - Blood (collected 17 Oct 2018 18:43)  Source: .Blood Blood  Preliminary Report (18 Oct 2018 19:01):    No growth to date.         Anemia Panel:      Thyroid Panel:        Amylase, Serum Total: 20 U/L (10-17-18 @ 15:11)  Lipase, Serum: 54 U/L (10-17-18 @ 15:11)          RADIOLOGY & ADDITIONAL TESTS:  New imaging reviewed    HEALTH ISSUES - PROBLEM Dx:  Positive blood culture: Positive blood culture  Diabetes mellitus type 2, uncontrolled: Diabetes mellitus type 2, uncontrolled  Need for prophylactic measure: Need for prophylactic measure  Hypercholesteremia: Hypercholesteremia  Hepatic encephalopathy: Hepatic encephalopathy  Biliary cirrhosis: Biliary cirrhosis  Fatty liver disease, nonalcoholic: Fatty liver disease, nonalcoholic  Neuropathy: Neuropathy  Type 2 diabetes mellitus: Type 2 diabetes mellitus  Abdominal pain: Abdominal pain  Obesity: Obesity  Diabetes: Diabetes  Hypertension: Hypertension  Stone in kidney: Stone in kidney              Consultant(s) Notes Reviewed:  [ x ] YES     Care Discussed with [X] Consultants  [x  ] Patient  [  ] Family  [  ]   [  ] Social Service  [  ] RN, [  ] Physical Therapy  DVT PPX: [ x ] Lovenox, [  ] S C Heparin, [  ] Coumadin, [  ] Xarelto, [  ] Eliquis, [  ] Pradaxa, [  ] IV Heparin drip, [ x ] SCD [  ] Contraindication 2 to GI Bleed,[  ] Ambulation  Advanced directive: [  ] None, [  ] DNR/DNI Patient is a 62y old  Male who presents with a chief complaint of abdominal pain (19 Oct 2018 09:26)      INTERVAL HPI:  Pt is a 63 y/o M with PMHx of Type 2 DM, HTN, HLD, obesity, nephrolithiasis (last stone 25 years ago), neuropathy, HE, NAFLD, who presents with abdominal pain and vomiting for the past two days. Pt states he has had a decreased appetite since Sunday evening along with nonradiating intermittent epigastric abd pain rated 7/10. Yesterday he felt nauseous and vomited greenish fluid once. Today, he vomited 3 more times in the morning and went to Dr. Morales's office, who recommended pt get bloodwork and have a CT Abd/Pelvis. Pt states after leaving office, he started feeling nauseous and vomited once more before coming into ED. Admits epigastric abd pain, nausea, vomiting. Denies HA, dizziness, CP, SOB, diarrhea, or bloody stools. Denies recent travel or sick contacts.    In the ED, VS: T 98.4, HR 98, /77, RR 17, sat 99% RA, WBC 5.94, H/H 12.5/42.2, plat 108, Lactate 4.1, ammonia 73, Cl 112, CO2 21, BUN 13, Cr 0.71, Glu 189, Ca 7.5, prot 5.5, alb 2.1, bili 4.0, alk phos 165,AST 29, ALT 22, amylase 20, lipase 20, CT abd/pelvis - Impression may be associated with duodenitis and/or peptic ulcer disease; recommend further clinical correlation. 4 mm nonobstructing calcification at the lower pole the left kidney. CXR showing no acute pathology. EKG showing mild prolonged Qtc at 480.    OVERNIGHT EVENTS: NONE.    10/18/2018: Pt seen, examined. Continues to endorse epigastric, dull constant abdominal pain slightly improved. Also endorsing loose nonbloody BM x1 this AM. Denies nausea/vomiting, CP, SOB, urinary symptoms, melena, hematochezia. Awaiting EGD. Zofran held as Qtc 480. NPO for EGD, On Lactulose, drop in H/H    10/19/18: Overnight ++blood cx gram variable rods, blood cx sent, started on zosyn. ID Dr. REYNOSO called. EGD done, showing non bleeding esophageal ulcers, esophagitis, non bleeding duodenal ulcers, no EV/GV. Pt seen, examined. Denying abdominal pain, nausea/vomiting, diarrhea. No fevers, chills, night sweats. NO other complaints, per pt he could not have MRI abd/ MRCP due to his body habitus. Pt is on IV Zosyn . Follow Repeat blood culture.      Home Medications:  Aspir 81 oral delayed release tablet: 1 tab(s) orally once a day (17 Oct 2018 20:22)  furosemide 20 mg oral tablet: 1 tab(s) orally once a day (17 Oct 2018 20:22)  gabapentin 800 mg oral tablet: orally 4 times a day (17 Oct 2018 20:22)  HumaLOG 100 units/mL subcutaneous solution: 20 unit(s) subcutaneous 2 times a day(breakfast and lunch) (17 Oct 2018 20:22)  HumaLOG 100 units/mL subcutaneous solution: 30 unit(s) subcutaneous once a day(@dinner) (17 Oct 2018 20:22)  irbesartan 300 mg oral tablet: 1 tab(s) orally once a day (17 Oct 2018 20:22)  metFORMIN 1000 mg oral tablet: 1 tab(s) orally 2 times a day (17 Oct 2018 20:22)  omeprazole 20 mg oral delayed release capsule: 1 cap(s) orally once a day (17 Oct 2018 20:22)  phytonadione 100 mcg oral tablet: 1 tab(s) orally once a day (17 Oct 2018 20:22)  pioglitazone 30 mg oral tablet: 1 tab(s) orally once a day (17 Oct 2018 20:22)  potassium chloride 10 mEq oral tablet, extended release: 1 tab(s) orally once a day (17 Oct 2018 20:22)  pravastatin 40 mg oral tablet: 1 tab(s) orally once a day (17 Oct 2018 20:22)  Toujeo SoloStar: 50 unit(s) subcutaneous once a day (at bedtime) (17 Oct 2018 20:22)  ursodiol 300 mg oral capsule: 1 cap(s) orally 3 times a day (17 Oct 2018 20:22)  Vitamin D3 2000 intl units oral tablet: 1 tab(s) orally once a day (17 Oct 2018 20:22)    MEDICATIONS  (STANDING):  dextrose 5%. 1000 milliLiter(s) (50 mL/Hr) IV Continuous <Continuous>  dextrose 50% Injectable 12.5 Gram(s) IV Push once  dextrose 50% Injectable 25 Gram(s) IV Push once  dextrose 50% Injectable 25 Gram(s) IV Push once  gabapentin 800 milliGRAM(s) Oral four times a day  insulin glargine Injectable (LANTUS) 50 Unit(s) SubCutaneous at bedtime  insulin lispro (HumaLOG) corrective regimen sliding scale   SubCutaneous three times a day before meals  insulin lispro Injectable (HumaLOG) 10 Unit(s) SubCutaneous three times a day before meals  lactulose Syrup 30 Gram(s) Oral two times a day  losartan 100 milliGRAM(s) Oral daily  pantoprazole    Tablet 40 milliGRAM(s) Oral two times a day  piperacillin/tazobactam IVPB. 3.375 Gram(s) IV Intermittent every 8 hours  rifaximin 550 milliGRAM(s) Oral two times a day  sodium chloride 0.9%. 1000 milliLiter(s) (75 mL/Hr) IV Continuous <Continuous>  sucralfate 1 Gram(s) Oral every 6 hours  ursodiol Capsule 300 milliGRAM(s) Oral three times a day    MEDICATIONS  (PRN):  dextrose 40% Gel 15 Gram(s) Oral once PRN Blood Glucose LESS THAN 70 milliGRAM(s)/deciliter  glucagon  Injectable 1 milliGRAM(s) IntraMuscular once PRN Glucose LESS THAN 70 milligrams/deciliter        Allergies    codeine (Anaphylaxis)    Intolerances        REVIEW OF SYSTEMS:  CONSTITUTIONAL: No fever, No chills, No fatigue, No myalgia, No Body ache, No Weakness  EYES: No eye pain,  No visual disturbances, No discharge, NO Redness  ENMT:  No ear pain, No nose bleed, No vertigo; No sinus or throat pain, No Congestion  NECK: No pain, No stiffness  RESPIRATORY: No cough, wheezing, No  hemoptysis, No shortness of breath  CARDIOVASCULAR: No chest pain, palpitations  GASTROINTESTINAL: No abdominal or epigastric pain. No nausea, No vomiting; No diarrhea or constipation. [  x] BMx1  GENITOURINARY: No dysuria, No frequency, No urgency, No hematuria, or incontinence  NEUROLOGICAL: No headaches, No dizziness, No numbness, No tingling, No tremors, No weakness  EXT: No Swelling, No Pain, No Edema  SKIN:  [ x ] No itching, burning, rashes, or lesions   MUSCULOSKELETAL: No joint pain or swelling; No muscle pain, No back pain, No extremity pain  PSYCHIATRIC: No depression, anxiety, mood swings or difficulty sleeping at night  PAIN SCALE: [ x ] None  [  ] Other-  ROS Unable to obtain due to - [  ] Dementia  [  ] Lethargy  [  ] Sedated [  ] non verbal  REST OF REVIEW Of SYSTEM - [ x ] Normal     Vital Signs Last 24 Hrs  T(C): 36.7 (19 Oct 2018 05:18), Max: 37.1 (18 Oct 2018 16:58)  T(F): 98.1 (19 Oct 2018 05:18), Max: 98.7 (18 Oct 2018 16:58)  HR: 75 (19 Oct 2018 05:18) (75 - 92)  BP: 129/68 (19 Oct 2018 05:18) (108/69 - 129/68)  BP(mean): --  RR: 18 (19 Oct 2018 05:18) (17 - 18)  SpO2: 95% (19 Oct 2018 05:18) (94% - 98%)  Finger Stick        10-18 @ 07:01  -  10-19 @ 07:00  --------------------------------------------------------  IN: 750 mL / OUT: 0 mL / NET: 750 mL        PHYSICAL EXAM:  GENERAL:  [x  ] NAD , [x  ] well appearing, [  ] Agitated, [  ] Drowsy,  [  ] Lethargy, [  ] confused   HEAD:  [ x ] Normal, [  ] Other  EYES:  [  x] EOMI, [  x] PERRLA, [x  ] conjunctiva and sclera clear normal, [  ] Other,  [  ] Pallor,[  ] Discharge  ENMT:  [ x ] Normal, [x  ] Moist mucous membranes, [ x ] Good dentition, [x  ] No Thrush  NECK:  [ x ] Supple, [ x ] No JVD, [ x ] Normal thyroid, [  ] Lymphadenopathy [  ] Other  CHEST/LUNG:  [ x ] Clear to auscultation bilaterally, [  x] Breath Sounds equal,  [ x ] No rales, [x  ] No rhonchi  [ x ]  No wheezing  HEART:  [ x ] Regular rate and rhythm, [  ] tachycardia, [  ] Bradycardia,  [  ] irregular  [x  ] No murmurs, No rubs, No gallops, [  ] PPM in place (Mfr:  )  ABDOMEN:  [x  ] Soft, [  x] Nontender, [ x ] Nondistended, [  ] No mass, [x ] Bowel sounds present, [ x ] obese  NERVOUS SYSTEM:  [x  ] Alert & Oriented X3, [x  ] Nonfocal  [  ] Confusion  [  ] Encephalopathic [  ] Sedated [  ] Unable to assess, [  ] Other-  EXTREMITIES: [ x ] 2+ Peripheral Pulses, No clubbing, No cyanosis,  [  ] edema B/L lower EXT. [  ] PVD stasis skin changes B/L Lower EXT  LYMPH: No lymphadenopathy noted  SKIN:  [ x ] No rashes or lesions, [  ] Pressure Ulcers, [  ] ecchymosis, [  ] Skin Tears, [  ] Other    DIET: DASH/TLC    LABS:                        12.3   3.97  )-----------( 84       ( 19 Oct 2018 08:46 )             36.2     19 Oct 2018 08:46    141    |  107    |  11     ----------------------------<  132    4.0     |  27     |  0.88     Ca    8.7        19 Oct 2018 08:46    TPro  5.9    /  Alb  2.3    /  TBili  3.3    /  DBili  1.40   /  AST  36     /  ALT  25     /  AlkPhos  133    19 Oct 2018 08:46    PT/INR - ( 18 Oct 2018 08:59 )   PT: 17.5 sec;   INR: 1.59 ratio         PTT - ( 18 Oct 2018 08:59 )  PTT:35.4 sec      Culture Results:   No growth to date. (10-17 @ 18:43)  Culture Results:   Growth in anaerobic bottle: Gram Variable Rods (10-17 @ 18:43)      culture blood  -- .Blood Blood 10-17 @ 18:43    culture urine  --  10-17 @ 18:43      Culture - Blood (collected 17 Oct 2018 18:43)  Source: .Blood Blood  Gram Stain (18 Oct 2018 23:09):    Growth in anaerobic bottle: Gram Variable Rods  Preliminary Report (18 Oct 2018 23:10):    Growth in anaerobic bottle: Gram Variable Rods    Culture - Blood (collected 17 Oct 2018 18:43)  Source: .Blood Blood  Preliminary Report (18 Oct 2018 19:01):    No growth to date.       Amylase, Serum Total: 20 U/L (10-17-18 @ 15:11)  Lipase, Serum: 54 U/L (10-17-18 @ 15:11)    Hemoglobin A1C, Whole Blood (10.18.18 @ 10:50)    Hemoglobin A1C, Whole Blood: 10.2: Method: Immunoassay       Reference Range                4.0-5.6%       High risk (prediabetic)        5.7-6.4%       Diabetic, diagnostic             >=6.5%       ADA diabetic treatment goal       <7.0%  The Hemoglobin A1c testing is NGSP-certified.Reference ranges are based  upon the 2010 recommendations of  the American Diabetes Association.  Interpretation may vary for children  and adolescents. %      RADIOLOGY & ADDITIONAL TESTS:  New imaging reviewed    HEALTH ISSUES - PROBLEM Dx:  Positive blood culture: Positive blood culture  Diabetes mellitus type 2, uncontrolled: Diabetes mellitus type 2, uncontrolled  Need for prophylactic measure: Need for prophylactic measure  Hypercholesteremia: Hypercholesteremia  Hepatic encephalopathy: Hepatic encephalopathy  Biliary cirrhosis: Biliary cirrhosis  Fatty liver disease, nonalcoholic: Fatty liver disease, nonalcoholic  Neuropathy: Neuropathy  Type 2 diabetes mellitus: Type 2 diabetes mellitus  Abdominal pain: Abdominal pain  Obesity: Obesity  Diabetes: Diabetes  Hypertension: Hypertension  Stone in kidney: Stone in kidney      Consultant(s) Notes Reviewed:  [ x ] YES     Care Discussed with [X] Consultants  [x  ] Patient  [  ] Family  [  ]   [  ] Social Service  [ x ] RN, [  ] Physical Therapy  DVT PPX: [ x ] Lovenox, [  ] S C Heparin, [  ] Coumadin, [  ] Xarelto, [  ] Eliquis, [  ] Pradaxa, [  ] IV Heparin drip, [ x ] SCD [  ] Contraindication 2 to GI Bleed,[  ] Ambulation  Advanced directive: [  ] None, [  ] DNR/DNI

## 2018-10-19 NOTE — DISCHARGE NOTE ADULT - MEDICATION SUMMARY - MEDICATIONS TO STOP TAKING
I will STOP taking the medications listed below when I get home from the hospital:    Aspir 81 oral delayed release tablet  -- 1 tab(s) by mouth once a day    metFORMIN 1000 mg oral tablet  -- 1 tab(s) by mouth 2 times a day    pioglitazone 30 mg oral tablet  -- 1 tab(s) by mouth once a day

## 2018-10-19 NOTE — CONSULT NOTE ADULT - SUBJECTIVE AND OBJECTIVE BOX
HPI:  Pt is a 61 YO M with PMHx obesity, DM 2, HTN, HLD, nephrolithiasis, neuropathy, poss JEAN  admitted with CC of 2 day hx of abdominal pain and vomiting. No fever chills recent travel or   sick contacts. CT showed duodenitis and/or peptic ulcer disease and pt is sp EGD which showed   non bleeding esophageal ulcers, esophagitis, non bleeding duodenal ulcers. Pt feels well and is   nontoxic. However blood cultures from admission now with single set GVR. Pt remained afebrile   and wbc wnl.    Infectious Disease consult was called to evaluate pt due to bacteremia.    Past Medical & Surgical Hx:  PAST MEDICAL & SURGICAL HISTORY:  Hepatic encephalopathy  Obesity  Fatty liver disease, nonalcoholic  Renal stones: 25 years ago  Hypertension  Neuropathy  Hypercholesteremia  Diabetes  S/P cholecystectomy    Social History--  EtOH: socially  Tobacco: denies  Drug Use: denies   Retired contractor  Lives with wife and family      FAMILY HISTORY:  Family history of type 2 diabetes mellitus (Father)  Family history of hypertension (Father)  Family history of stomach cancer (Father)      Allergies  codeine (Anaphylaxis)    Home Medications:  Aspir 81 oral delayed release tablet: 1 tab(s) orally once a day (17 Oct 2018 20:22)  furosemide 20 mg oral tablet: 1 tab(s) orally once a day (17 Oct 2018 20:22)  gabapentin 800 mg oral tablet: orally 4 times a day (17 Oct 2018 20:22)  HumaLOG 100 units/mL subcutaneous solution: 20 unit(s) subcutaneous 2 times a day(breakfast and lunch) (17 Oct 2018 20:22)  HumaLOG 100 units/mL subcutaneous solution: 30 unit(s) subcutaneous once a day(@dinner) (17 Oct 2018 20:22)  irbesartan 300 mg oral tablet: 1 tab(s) orally once a day (17 Oct 2018 20:22)  metFORMIN 1000 mg oral tablet: 1 tab(s) orally 2 times a day (17 Oct 2018 20:22)  omeprazole 20 mg oral delayed release capsule: 1 cap(s) orally once a day (17 Oct 2018 20:22)  phytonadione 100 mcg oral tablet: 1 tab(s) orally once a day (17 Oct 2018 20:22)  pioglitazone 30 mg oral tablet: 1 tab(s) orally once a day (17 Oct 2018 20:22)  potassium chloride 10 mEq oral tablet, extended release: 1 tab(s) orally once a day (17 Oct 2018 20:22)  pravastatin 40 mg oral tablet: 1 tab(s) orally once a day (17 Oct 2018 20:22)  Toujeo SoloStar: 50 unit(s) subcutaneous once a day (at bedtime) (17 Oct 2018 20:22)  ursodiol 300 mg oral capsule: 1 cap(s) orally 3 times a day (17 Oct 2018 20:22)  Vitamin D3 2000 intl units oral tablet: 1 tab(s) orally once a day (17 Oct 2018 20:22)    Current Inpatient Medications :    ANTIBIOTICS:   piperacillin/tazobactam IVPB. 3.375 Gram(s) IV Intermittent every 8 hours  rifaximin 550 milliGRAM(s) Oral two times a day      OTHER RELEVANT MEDICATIONS :  dextrose 40% Gel 15 Gram(s) Oral once PRN  dextrose 5%. 1000 milliLiter(s) IV Continuous <Continuous>  dextrose 50% Injectable 12.5 Gram(s) IV Push once  dextrose 50% Injectable 25 Gram(s) IV Push once  dextrose 50% Injectable 25 Gram(s) IV Push once  enoxaparin Injectable 40 milliGRAM(s) SubCutaneous daily  gabapentin 800 milliGRAM(s) Oral four times a day  glucagon  Injectable 1 milliGRAM(s) IntraMuscular once PRN  insulin glargine Injectable (LANTUS) 50 Unit(s) SubCutaneous at bedtime  insulin lispro (HumaLOG) corrective regimen sliding scale   SubCutaneous three times a day before meals  insulin lispro Injectable (HumaLOG) 10 Unit(s) SubCutaneous three times a day before meals  lactulose Syrup 30 Gram(s) Oral two times a day  losartan 100 milliGRAM(s) Oral daily  pantoprazole    Tablet 40 milliGRAM(s) Oral two times a day  sodium chloride 0.9%. 1000 milliLiter(s) IV Continuous <Continuous>  sucralfate 1 Gram(s) Oral every 6 hours  ursodiol Capsule 300 milliGRAM(s) Oral three times a day    ROS:  CONSTITUTIONAL:  Negative fever or chills, feels well, good appetite  EYES:  Negative  blurry vision or double vision  CARDIOVASCULAR:  Negative for chest pain or palpitations  RESPIRATORY:  Negative for cough, wheezing, or SOB   GASTROINTESTINAL:  Negative for nausea, vomiting, diarrhea, constipation, or abdominal pain  GENITOURINARY:  Negative frequency, urgency , dysuria or hematuria   NEUROLOGIC:  No headache, confusion, dizziness, lightheadedness  All other systems were reviewed and are negative    I&O's Detail    18 Oct 2018 07:01  -  19 Oct 2018 07:00  --------------------------------------------------------  IN:    sodium chloride 0.9%.: 750 mL  Total IN: 750 mL    OUT:  Total OUT: 0 mL    Total NET: 750 mL    Physical Exam:  Vital Signs Last 24 Hrs  T(C): 36.7 (19 Oct 2018 05:18), Max: 37.1 (18 Oct 2018 16:58)  T(F): 98.1 (19 Oct 2018 05:18), Max: 98.7 (18 Oct 2018 16:58)  HR: 75 (19 Oct 2018 05:18) (75 - 92)  BP: 129/68 (19 Oct 2018 05:18) (108/69 - 129/68)  BP(mean): --  RR: 18 (19 Oct 2018 05:18) (17 - 18)  SpO2: 95% (19 Oct 2018 05:18) (94% - 98%)  Height (cm): 182.9 (10-18 @ 15:26)    General:  no acute distress  Eyes: sclera anicteric, pupils equal and reactive to light  ENMT: buccal mucosa moist, pharynx not injected  Neck: supple, trachea midline  Lungs: clear, no wheeze/rhonchi  Cardiovascular: regular rate and rhythm, S1 S2  Abdomen: soft, nontender, no organomegaly present, bowel sounds normal  Neurological:  alert and oriented x3, Cranial Nerves II-XII grossly intact  Skin: no increased ecchymosis/petechiae/purpura  Lymph Nodes: no palpable cervical/supraclavicular lymph nodes enlargements  Extremities: no cyanosis/clubbing/edema    Labs:                        12.3   3.97  )-----------( 84       ( 19 Oct 2018 08:46 )             36.2   10-19    141  |  107  |  11  ----------------------------<  132<H>  4.0   |  27  |  0.88    Ca    8.7      19 Oct 2018 08:46    TPro  5.9<L>  /  Alb  2.3<L>  /  TBili  3.3<H>  /  DBili  1.40<H>  /  AST  36  /  ALT  25  /  AlkPhos  133<H>  10-19      RECENT CULTURES:    Culture - Blood (collected 17 Oct 2018 18:43)  Source: .Blood Blood  Gram Stain (18 Oct 2018 23:09):    Growth in anaerobic bottle: Gram Variable Rods  Preliminary Report (18 Oct 2018 23:10):    Growth in anaerobic bottle: Gram Variable Rods    Culture - Blood (collected 17 Oct 2018 18:43)  Source: .Blood Blood  Preliminary Report (18 Oct 2018 19:01):    No growth to date.    RADIOLOGY & ADDITIONAL STUDIES:   CT abd/pelvis - Impression may be associated with duodenitis and/or peptic ulcer disease; recommend further clinical correlation.    Assessment :   Pt is a 61 YO M with PMHx obesity, DM 2, HTN, HLD, nephrolithiasis, neuropathy, poss JEAN  admitted with CC of 2 day hx of abdominal pain and vomiting found to have duodenitis, PUD  sp EGD which showed non bleeding esophageal ulcers, esophagitis, non bleeding duodenal   ulcers now with blood cultures from admission growing single set GVR.   Pt remained afebrile and wbc wnl.  Nontoxic  Bacteremia with GVR likely contaminant but will need to rule out true infection ?GNR    Plan :   Cont Zosyn for now  Fu blood cultures    D/w Medicine team    Continue with present regiment .  Appropriate use of antibiotics and adverse effects reviewed.      I have discussed the above plan of care with patient/family in detail. They expressed understanding of the treatment plan . Risks, benefits and alternatives discussed in detail. I have asked if they have any questions or concerns and appropriately addressed them to the best of my ability .    Dr Vinny Arreguin to cover over weekend.    > 45 minutes spent in direct patient care reviewing  the notes, lab data/ imaging , discussion with multidisciplinary team. All questions were addressed and answered to the best of my capacity .    Thank you for allowing me to participate in the care of your patient .      Tanya Rios MD  Infectious Disease  [phone]

## 2018-10-19 NOTE — DISCHARGE NOTE ADULT - HOSPITAL COURSE
61 y/o M with PMHx of Type 2 DM, HTN, HLD, obesity, nephrolithiasis (last stone 25 years ago), neuropathy, HE, NAFLD, who presented with abdominal pain and vomiting x two days. Pt stated he had a decreased appetite since Sunday evening along with nonradiating intermittent epigastric abd pain rated 7/10. He developed nausea and vomited greenish fluid once. Patient vomited 3 more times in the morning and went to Dr. Morales's office, who recommended pt get bloodwork and have a CT Abd/Pelvis. Pt states after leaving office, he started feeling nauseous and vomited once more before deciding to come to [facility] ED for further medical evaluation.     In the ED, VS: T 98.4, HR 98, /77, RR 17, sat 99% RA, WBC 5.94, H/H 12.5/42.2, plat 108, Lactate 4.1, ammonia 73, Cl 112, CO2 21, BUN 13, Cr 0.71, Glu 189, Ca 7.5, prot 5.5, alb 2.1, bili 4.0, alk phos 165,AST 29, ALT 22, amylase 20, lipase 20, CT abd/pelvis exhibited findings which may be associated with duodenitis and/or peptic ulcer disease, and a 4 mm nonobstructing calcification at the lower pole the left kidney. CXR showing no acute pathology. EKG showing mild prolonged Qtc at 480. Patient endorsed epigastric abd pain, nausea, vomiting. Denied HA, dizziness, CP, SOB, diarrhea, or bloody stools. Denies recent travel or sick contacts.    Patient was seen by gastroenterology specialist Dr. Morales. Patient had EGD which exhibited several non-bleeding esophageal ulcers, duodenal ulcers and esophagitis, likely due to PUD and gastritis 2/2 NSAID use. Patient was treated with carafate and PPI. Patient improved symptomatically with treatment. Patient did not complete MRCP to workup biliary cirrhosis. Patient is instructed to follow up with gastroenterologist as outpatient for MRI/MRCP.    Patient was also treat for hepatic encephalopathy 2/2 elevated ammonia and fatty liver disease. Patient was treated with lactulose, ursodiol and rifaximin. Patient improved symptomatically with treatment.     Patient was treated for bacteremia after one blood culture produced gram variable rods. Patient was treated with zosyn and seen by infectious disease specialist.    Patient is medically stable and improved for discharge home with outpatient follow up. 61 y/o M with PMHx of Type 2 DM, HTN, HLD, obesity, nephrolithiasis (last stone 25 years ago), neuropathy, HE, NAFLD, who presented with abdominal pain and vomiting x two days. Pt stated he had a decreased appetite since Sunday evening along with nonradiating intermittent epigastric abd pain rated 7/10. He developed nausea and vomited greenish fluid once. Patient vomited 3 more times in the morning and went to Dr. Morales's office, who recommended pt get bloodwork and have a CT Abd/Pelvis. Pt states after leaving office, he started feeling nauseous and vomited once more before deciding to come to [facility] ED for further medical evaluation.     In the ED, VS: T 98.4, HR 98, /77, RR 17, sat 99% RA, WBC 5.94, H/H 12.5/42.2, plat 108, Lactate 4.1, ammonia 73, Cl 112, CO2 21, BUN 13, Cr 0.71, Glu 189, Ca 7.5, prot 5.5, alb 2.1, bili 4.0, alk phos 165,AST 29, ALT 22, amylase 20, lipase 20, CT abd/pelvis exhibited findings which may be associated with duodenitis and/or peptic ulcer disease, and a 4 mm nonobstructing calcification at the lower pole the left kidney. CXR showing no acute pathology. EKG showing mild prolonged Qtc at 480. Patient endorsed epigastric abd pain, nausea, vomiting. Denied HA, dizziness, CP, SOB, diarrhea, or bloody stools. Denies recent travel or sick contacts.    Patient was seen by gastroenterology specialist Dr. Morales. Patient had EGD which exhibited several non-bleeding esophageal ulcers, duodenal ulcers and esophagitis, likely due to PUD and gastritis 2/2 NSAID use. Patient was treated with carafate and PPI. Patient improved symptomatically with treatment. Patient did not complete MRCP to workup biliary cirrhosis. Patient is instructed to follow up with gastroenterologist as outpatient for MRI/MRCP.    Patient was also treated for elevated ammonia levels and history of hepatic encephalopathy likely secondary to fatty liver disease. Patient was treated with lactulose, ursodiol and rifaximin. Patient's platelets were low during admission and Pt was instructed to follow up with hematology outpatient.      Patient was treated for bacteremia after one blood culture produced gram variable rods. Patient was treated with zosyn and seen by infectious disease specialist.    Patient is medically stable and improved for discharge home with outpatient follow up.    On discharge date, Pt's vitals and physical exam were: 63 y/o M with PMHx of Type 2 DM, HTN, HLD, obesity, nephrolithiasis (last stone 25 years ago), neuropathy, HE, NAFLD, who presented with abdominal pain and vomiting x two days. Pt stated he had a decreased appetite since Sunday evening along with nonradiating intermittent epigastric abd pain rated 7/10. He developed nausea and vomited greenish fluid once. Patient vomited 3 more times in the morning and went to Dr. Morales's office, who recommended pt get bloodwork and have a CT Abd/Pelvis. Pt states after leaving office, he started feeling nauseous and vomited once more before deciding to come to [facility] ED for further medical evaluation.     In the ED, VS: T 98.4, HR 98, /77, RR 17, sat 99% RA, WBC 5.94, H/H 12.5/42.2, plat 108, Lactate 4.1, ammonia 73, Cl 112, CO2 21, BUN 13, Cr 0.71, Glu 189, Ca 7.5, prot 5.5, alb 2.1, bili 4.0, alk phos 165,AST 29, ALT 22, amylase 20, lipase 20, CT abd/pelvis exhibited findings which may be associated with duodenitis and/or peptic ulcer disease, and a 4 mm nonobstructing calcification at the lower pole the left kidney. CXR showing no acute pathology. EKG showing mild prolonged Qtc at 480. Patient endorsed epigastric abd pain, nausea, vomiting. Denied HA, dizziness, CP, SOB, diarrhea, or bloody stools. Denies recent travel or sick contacts.    Patient was seen by gastroenterology specialist Dr. Morales. Patient had EGD which exhibited several non-bleeding esophageal ulcers, duodenal ulcers and esophagitis, likely due to PUD and gastritis 2/2 NSAID use. ASA on HOLD  Patient was treated with Carafate and PPI. Patient improved symptomatically with treatment. Patient did not complete MRCP to workup biliary cirrhosis. Patient is instructed to follow up with gastroenterologist as outpatient for MRI Liver with Contrast  /MRCP.for high Bili, Ammonia level & Low platelets, unable to get In Pt MRI/MRCP 2/2 Body habitus,     Patient was also treated for elevated ammonia levels and history of hepatic encephalopathy likely secondary to liver disease. Patient was treated with lactulose, ursodiol and rifaximin. Patient's platelets were low during admission and Pt was instructed to follow up with hematology outpatient.  Pt seen by DR C perlman for Uncontrolled DM with A1C 10.2, Hold all oral meds 2/2 Liver disease, dontinue Insulin.    Patient was treated for bacteremia after one blood culture produced gram variable rods. Patient was treated with zosyn and seen by infectious disease Dr Gabriel TEAGUE, Repeat Blood cultures- NEG, pt on IV Zosyn.   Pt seen by Diabetic NP    Patient is medically stable and improved for discharge home with outpatient follow up.    On discharge date, Pt's vitals and physical exam were: 61 y/o M with PMHx of Type 2 DM, HTN, HLD, obesity, nephrolithiasis (last stone 25 years ago), neuropathy, HE, NAFLD, who presented with abdominal pain and vomiting x two days. Pt stated he had a decreased appetite since Sunday evening along with nonradiating intermittent epigastric abd pain rated 7/10. He developed nausea and vomited greenish fluid once. Patient vomited 3 more times in the morning and went to Dr. Morales's office, who recommended pt get bloodwork and have a CT Abd/Pelvis. Pt states after leaving office, he started feeling nauseous and vomited once more before deciding to come to [facility] ED for further medical evaluation.     In the ED, VS: T 98.4, HR 98, /77, RR 17, sat 99% RA, WBC 5.94, H/H 12.5/42.2, plat 108, Lactate 4.1, ammonia 73, Cl 112, CO2 21, BUN 13, Cr 0.71, Glu 189, Ca 7.5, prot 5.5, alb 2.1, bili 4.0, alk phos 165,AST 29, ALT 22, amylase 20, lipase 20, CT abd/pelvis exhibited findings which may be associated with duodenitis and/or peptic ulcer disease, and a 4 mm nonobstructing calcification at the lower pole the left kidney. CXR showing no acute pathology. EKG showing mild prolonged Qtc at 480. Patient endorsed epigastric abd pain, nausea, vomiting. Denied HA, dizziness, CP, SOB, diarrhea, or bloody stools. Denies recent travel or sick contacts.    Patient was seen by gastroenterology specialist Dr. Morales. Patient had EGD which exhibited several non-bleeding esophageal ulcers, duodenal ulcers and esophagitis, likely due to PUD and gastritis 2/2 NSAID use. ASA on HOLD  Patient was treated with Carafate and PPI. Patient improved symptomatically with treatment. Patient did not complete MRCP to workup biliary cirrhosis. Patient is instructed to follow up with gastroenterologist as outpatient for MRI Liver with Contrast  /MRCP.for high Bili, Ammonia level & Low platelets, unable to get In Pt MRI/MRCP 2/2 Body habitus,     Patient was also treated for elevated ammonia levels and history of hepatic encephalopathy likely secondary to liver disease. Patient was treated with lactulose, ursodiol and rifaximin. Patient's platelets were low during admission and Pt was instructed to follow up with hematology outpatient.  Pt seen by DR C perlman for Uncontrolled DM with A1C 10.2, Hold all oral meds 2/2 Liver disease, Continue Insulin.    Patient was treated for bacteremia after one blood culture produced gram variable rods. Patient was treated with zosyn and seen by infectious disease Dr Gabriel TEAGUE, Repeat Blood cultures- NEG, pt on IV Zosyn.   Pt seen by Diabetic NP    Patient is medically stable and improved for discharge home with outpatient follow up.    On discharge date, Pt's vitals and physical exam were: 63 y/o M with PMHx of Type 2 DM, HTN, HLD, obesity, nephrolithiasis (last stone 25 years ago), neuropathy, HE, NAFLD, who presented with abdominal pain and vomiting x two days. Pt stated he had a decreased appetite since Sunday evening along with nonradiating intermittent epigastric abd pain rated 7/10. He developed nausea and vomited greenish fluid once. Patient vomited 3 more times in the morning and went to Dr. Morales's office, who recommended pt get bloodwork and have a CT Abd/Pelvis. Pt states after leaving office, he started feeling nauseous and vomited once more before deciding to come to [facility] ED for further medical evaluation.     In the ED, VS: T 98.4, HR 98, /77, RR 17, sat 99% RA, WBC 5.94, H/H 12.5/42.2, plat 108, Lactate 4.1, ammonia 73, Cl 112, CO2 21, BUN 13, Cr 0.71, Glu 189, Ca 7.5, prot 5.5, alb 2.1, bili 4.0, alk phos 165,AST 29, ALT 22, amylase 20, lipase 20, CT abd/pelvis exhibited findings which may be associated with duodenitis and/or peptic ulcer disease, and a 4 mm nonobstructing calcification at the lower pole the left kidney. CXR showing no acute pathology. EKG showing mild prolonged Qtc at 480. Patient endorsed epigastric abd pain, nausea, vomiting. Denied HA, dizziness, CP, SOB, diarrhea, or bloody stools. Denies recent travel or sick contacts.    Patient was seen by gastroenterology specialist Dr. Morales. Patient had EGD which exhibited several non-bleeding esophageal ulcers, duodenal ulcers and esophagitis, likely due to PUD and gastritis 2/2 NSAID use. ASA on HOLD  Patient was treated with Carafate and PPI. Patient improved symptomatically with treatment. Patient did not complete MRCP to workup biliary cirrhosis. Patient is instructed to follow up with gastroenterologist as outpatient for MRI Liver with Contrast  /MRCP.for high Bili, Ammonia level & Low platelets, unable to get In Pt MRI/MRCP 2/2 Body habitus,     Patient was also treated for elevated ammonia levels and history of hepatic encephalopathy likely secondary to liver disease. Patient was treated with lactulose, ursodiol and rifaximin. Patient's platelets were low during admission and Pt was instructed to follow up with hematology outpatient.  Pt seen by DR C perlman for Uncontrolled DM with A1C 10.2, Hold all oral meds 2/2 Liver disease, Continue Insulin.    Patient was treated for bacteremia after one blood culture produced gram variable rods. Patient was treated with zosyn and seen by infectious disease Dr Gabriel TEAGUE, Repeat Blood cultures- NEG, pt on IV Zosyn. ID changed to PO Cipro 500 mg 2x day & Flagyl 500 mg Q 8 hrs x 5 days both Abx with Bacid for 2 weeks,  Ammonia level is high  Lactulose increased to 4 x day as per GI, Pt advised NOT to drive until cleared by GI.   Pt seen by Diabetic NP & Dietary about his diet education.  Pt MUST have out pt MRI liver & MRCP as well colonoscopy to be schedule with GI DR Morales.    Patient is medically stable and improved for discharge home with outpatient follow up.

## 2018-10-19 NOTE — DISCHARGE NOTE ADULT - NS AS ACTIVITY OBS
Bathing allowed/Showering allowed/Walking-Indoors allowed Walking-Outdoors allowed/No Heavy lifting/straining/Bathing allowed/Showering allowed/Walking-Indoors allowed

## 2018-10-19 NOTE — DISCHARGE NOTE ADULT - ADDITIONAL INSTRUCTIONS
-Please follow up with your primary doctor within one week.  -Please follow up with gastroenterologist outpatient within one week of discharge for workup with MRCP  -Patient and family are responsible to set up all follow up appointments.  -Continue taking your medications as directed above.  -If symptoms persist/worsen, please call your PMD or return to the ED. -Please follow up with your primary doctor within one week.  -Please follow up with gastroenterologist outpatient within one week of discharge for workup with MRCP & MRI Liver  -Patient and family are responsible to set up all follow up appointments.  -Continue taking your medications as directed above.  -If symptoms persist/worsen, please call your PMD or return to the ED. -Please follow up with your primary doctor within one week.  -Please follow up with gastroenterologist outpatient within one week of discharge for workup with MRCP & MRI Liver  -Please follow up with hematologist outpatient within one week of discharge for workup of low platelet count.  -Patient and family are responsible to set up all follow up appointments.  -Continue taking your medications as directed above.  -If symptoms persist/worsen, please call your PMD or return to the ED. -Please follow up with your primary doctor within one week.Dr myrick  -Please follow up with gastroenterologist outpatient within one week of discharge for workup with MRCP & MRI Liver- Dr Morales  -Please follow up with hematologist outpatient within one week of discharge for workup of low platelet count. Dr Rios -[facility]  -Patient and family are responsible to set up all follow up appointments.  -Continue taking your medications as directed above.  -If symptoms persist/worsen, please call your PMD or return to the ED. -Please follow up with your primary doctor within one week.Dr myrick  -Please follow up with gastroenterologist outpatient within one week of discharge for workup with MRCP & MRI Liver- Dr Morales  -Please follow up with hematologist outpatient within one week of discharge for workup of low platelet count. Dr Rios -[facility]  -Patient and family are responsible to set up all follow up appointments.  a1c 10.2% Keep follow up appt: Dr. Hutchinson Nov 5th 9:30am for diabetes care  -Continue taking your medications as directed above.  -If symptoms persist/worsen, please call your PMD or return to the ED. -Please follow up with your primary doctor within one week-.Dr myrick in 1week  -Please follow up with gastroenterologist outpatient within one week of discharge for workup with MRCP & MRI Liver- Dr Morales  -Please follow up with hematologist outpatient within one week of discharge for workup of low platelet count. Dr Rios -[facility]  -Patient and family are responsible to set up all follow up appointments.  a1c 10.2% Keep follow up appt: Dr. Hutchinson Nov 5th 9:30am for diabetes care  -Continue taking your medications as directed above.  -If symptoms persist/worsen, please call your PMD or return to the ED.

## 2018-10-19 NOTE — PROGRESS NOTE ADULT - PROBLEM SELECTOR PLAN 3
- chronic, stable   -elevated Bili -?? Etiology  - pt follows GI (Dr. Morales) as outpt  - Lasix being held  - continue to monitor -Blood cx 10/17 growing gram variable rods. F/u 10/19 blood cultures. AFEBRILE NO LUEKOCYTOSIS.  -Started on zosyn  -ID dr. Rios called, awaiting reccs  -Monitor - chronic, stable   -elevated Bili -?? Etiology  - pt follows GI (Dr. Morales) outpt.  GI team called, reccs:US 2/2018 noted, with liver lab w/u at that time, neg with exception of +asca/tammi; cont ppi, sandra, rifaximin bid  cont lactulose bid, titrate for 3-4 soft bms/day  trend ammonia levels, trend   - Lasix being held  -MRCP/ liver MRI unable to be performed 2/2 patient body habitus; will follow up GI outpatient on d/c  - continue to monitor

## 2018-10-19 NOTE — DISCHARGE NOTE ADULT - PROVIDER TOKENS
TOKEN:'[id number]',FREE:[LAST:[ramez],FIRST:[marci],PHONE:[phone]],FAX:[(   )    -],ADDRESS:[[address] [place] [address]]],TOKEN:'[id number]' TOKEN:'[id number]',FREE:[LAST:[ramez],FIRST:[marci],PHONE:[phone]],FAX:[(   )    -],ADDRESS:[[address] [place] [address]]] TOKEN:'[id number]',FREE:[LAST:[ramez],FIRST:[marci],PHONE:[phone]],FAX:[(   )    -],ADDRESS:[[address] [place] [address]]],TOKEN:'[id number]',TOKEN:'[id number]' TOKEN:'[id number]',TOKEN:'[id number]',TOKEN:'[id number]',TOKEN:'[id number]'

## 2018-10-19 NOTE — PROGRESS NOTE ADULT - PROBLEM SELECTOR PLAN 8
- chronic, stable  - continue home pravastatin 40mg qd - 2/2 type 2 diabetes  - pt follows pods (Dr. Arroyo) as output  - continue home Gabapentin 800 mg QID

## 2018-10-19 NOTE — PROGRESS NOTE ADULT - PROBLEM SELECTOR PLAN 2
suspected JEAN  US 2/2018 noted, with liver lab w/u at that time, neg with exception of +asca/tammi  no free fluid on CT  for mri liver and mrcp today  cont ppi, sandra, rifaximin bid  cont lactulose bid, titrate for 3-4 soft bms/day  trend ammonia levels, trend lfts  monitor for bleeding  will follow

## 2018-10-19 NOTE — PROGRESS NOTE ADULT - PROBLEM SELECTOR PLAN 10
PADUA SCORE: 4, pharmacologic ppx indicated  - however, start SCDs for DVT ppx due to risk of bleeding with EGD in AM PADUA SCORE: 4, pharmacologic ppx indicated     lovenox 40mg SubQ daily for dvt ppx

## 2018-10-20 LAB
ANION GAP SERPL CALC-SCNC: 8 MMOL/L — SIGNIFICANT CHANGE UP (ref 5–17)
BUN SERPL-MCNC: 9 MG/DL — SIGNIFICANT CHANGE UP (ref 7–23)
CALCIUM SERPL-MCNC: 8.1 MG/DL — LOW (ref 8.5–10.1)
CHLORIDE SERPL-SCNC: 106 MMOL/L — SIGNIFICANT CHANGE UP (ref 96–108)
CO2 SERPL-SCNC: 26 MMOL/L — SIGNIFICANT CHANGE UP (ref 22–31)
CREAT ?TM UR-MCNC: 88 MG/DL — SIGNIFICANT CHANGE UP
CREAT ?TM UR-MCNC: 88 MG/DL — SIGNIFICANT CHANGE UP
CREAT SERPL-MCNC: 0.94 MG/DL — SIGNIFICANT CHANGE UP (ref 0.5–1.3)
GLUCOSE SERPL-MCNC: 166 MG/DL — HIGH (ref 70–99)
HCT VFR BLD CALC: 36.5 % — LOW (ref 39–50)
HGB BLD-MCNC: 12.3 G/DL — LOW (ref 13–17)
MCHC RBC-ENTMCNC: 33.2 PG — SIGNIFICANT CHANGE UP (ref 27–34)
MCHC RBC-ENTMCNC: 33.7 GM/DL — SIGNIFICANT CHANGE UP (ref 32–36)
MCV RBC AUTO: 98.6 FL — SIGNIFICANT CHANGE UP (ref 80–100)
MICROALBUMIN UR-MCNC: <1.2 MG/DL — SIGNIFICANT CHANGE UP
MICROALBUMIN/CREAT UR-RTO: SIGNIFICANT CHANGE UP (ref 0–30)
NRBC # BLD: 0 /100 WBCS — SIGNIFICANT CHANGE UP (ref 0–0)
OSMOLALITY UR: 534 MOS/KG — SIGNIFICANT CHANGE UP (ref 50–1200)
PLATELET # BLD AUTO: 93 K/UL — LOW (ref 150–400)
POTASSIUM SERPL-MCNC: 3.9 MMOL/L — SIGNIFICANT CHANGE UP (ref 3.5–5.3)
POTASSIUM SERPL-SCNC: 3.9 MMOL/L — SIGNIFICANT CHANGE UP (ref 3.5–5.3)
PROT ?TM UR-MCNC: 10 MG/DL — SIGNIFICANT CHANGE UP (ref 0–12)
PROT/CREAT UR-RTO: 0.1 RATIO — SIGNIFICANT CHANGE UP (ref 0–0.2)
RBC # BLD: 3.7 M/UL — LOW (ref 4.2–5.8)
RBC # FLD: 13.9 % — SIGNIFICANT CHANGE UP (ref 10.3–14.5)
SODIUM SERPL-SCNC: 140 MMOL/L — SIGNIFICANT CHANGE UP (ref 135–145)
URATE UR-MCNC: 140.5 MG/DL — SIGNIFICANT CHANGE UP
UUN UR-MCNC: 47 MG/DL — SIGNIFICANT CHANGE UP
WBC # BLD: 3.89 K/UL — SIGNIFICANT CHANGE UP (ref 3.8–10.5)
WBC # FLD AUTO: 3.89 K/UL — SIGNIFICANT CHANGE UP (ref 3.8–10.5)

## 2018-10-20 RX ORDER — GABAPENTIN 400 MG/1
1 CAPSULE ORAL
Qty: 120 | Refills: 0 | OUTPATIENT
Start: 2018-10-20 | End: 2018-11-18

## 2018-10-20 RX ORDER — OMEPRAZOLE 10 MG/1
1 CAPSULE, DELAYED RELEASE ORAL
Qty: 0 | Refills: 0 | COMMUNITY

## 2018-10-20 RX ORDER — SUCRALFATE 1 G
1 TABLET ORAL
Qty: 120 | Refills: 0 | OUTPATIENT
Start: 2018-10-20 | End: 2018-11-18

## 2018-10-20 RX ORDER — INSULIN GLARGINE 100 [IU]/ML
50 INJECTION, SOLUTION SUBCUTANEOUS
Qty: 1 | Refills: 0 | OUTPATIENT
Start: 2018-10-20 | End: 2019-01-27

## 2018-10-20 RX ORDER — INSULIN GLARGINE 100 [IU]/ML
50 INJECTION, SOLUTION SUBCUTANEOUS
Qty: 0 | Refills: 0 | COMMUNITY

## 2018-10-20 RX ORDER — GABAPENTIN 400 MG/1
0 CAPSULE ORAL
Qty: 0 | Refills: 0 | COMMUNITY

## 2018-10-20 RX ORDER — METFORMIN HYDROCHLORIDE 850 MG/1
1 TABLET ORAL
Qty: 0 | Refills: 0 | COMMUNITY

## 2018-10-20 RX ORDER — ASPIRIN/CALCIUM CARB/MAGNESIUM 324 MG
1 TABLET ORAL
Qty: 0 | Refills: 0 | COMMUNITY

## 2018-10-20 RX ORDER — PIOGLITAZONE HYDROCHLORIDE 15 MG/1
1 TABLET ORAL
Qty: 0 | Refills: 0 | COMMUNITY

## 2018-10-20 RX ADMIN — PIPERACILLIN AND TAZOBACTAM 25 GRAM(S): 4; .5 INJECTION, POWDER, LYOPHILIZED, FOR SOLUTION INTRAVENOUS at 21:17

## 2018-10-20 RX ADMIN — Medication 1 GRAM(S): at 05:25

## 2018-10-20 RX ADMIN — Medication 10 UNIT(S): at 12:28

## 2018-10-20 RX ADMIN — Medication 1: at 08:39

## 2018-10-20 RX ADMIN — GABAPENTIN 800 MILLIGRAM(S): 400 CAPSULE ORAL at 17:41

## 2018-10-20 RX ADMIN — LACTULOSE 30 GRAM(S): 10 SOLUTION ORAL at 17:41

## 2018-10-20 RX ADMIN — Medication 1 GRAM(S): at 17:42

## 2018-10-20 RX ADMIN — ENOXAPARIN SODIUM 40 MILLIGRAM(S): 100 INJECTION SUBCUTANEOUS at 12:04

## 2018-10-20 RX ADMIN — URSODIOL 300 MILLIGRAM(S): 250 TABLET, FILM COATED ORAL at 13:50

## 2018-10-20 RX ADMIN — URSODIOL 300 MILLIGRAM(S): 250 TABLET, FILM COATED ORAL at 21:17

## 2018-10-20 RX ADMIN — GABAPENTIN 800 MILLIGRAM(S): 400 CAPSULE ORAL at 23:04

## 2018-10-20 RX ADMIN — URSODIOL 300 MILLIGRAM(S): 250 TABLET, FILM COATED ORAL at 05:25

## 2018-10-20 RX ADMIN — PIPERACILLIN AND TAZOBACTAM 25 GRAM(S): 4; .5 INJECTION, POWDER, LYOPHILIZED, FOR SOLUTION INTRAVENOUS at 05:23

## 2018-10-20 RX ADMIN — Medication 1 GRAM(S): at 23:06

## 2018-10-20 RX ADMIN — Medication 1: at 17:39

## 2018-10-20 RX ADMIN — Medication 10 UNIT(S): at 08:39

## 2018-10-20 RX ADMIN — GABAPENTIN 800 MILLIGRAM(S): 400 CAPSULE ORAL at 12:29

## 2018-10-20 RX ADMIN — LOSARTAN POTASSIUM 100 MILLIGRAM(S): 100 TABLET, FILM COATED ORAL at 05:25

## 2018-10-20 RX ADMIN — Medication 1 GRAM(S): at 12:05

## 2018-10-20 RX ADMIN — PIPERACILLIN AND TAZOBACTAM 25 GRAM(S): 4; .5 INJECTION, POWDER, LYOPHILIZED, FOR SOLUTION INTRAVENOUS at 13:49

## 2018-10-20 RX ADMIN — INSULIN GLARGINE 50 UNIT(S): 100 INJECTION, SOLUTION SUBCUTANEOUS at 21:17

## 2018-10-20 RX ADMIN — Medication 10 UNIT(S): at 17:40

## 2018-10-20 RX ADMIN — PANTOPRAZOLE SODIUM 40 MILLIGRAM(S): 20 TABLET, DELAYED RELEASE ORAL at 05:26

## 2018-10-20 RX ADMIN — LACTULOSE 30 GRAM(S): 10 SOLUTION ORAL at 05:24

## 2018-10-20 RX ADMIN — PANTOPRAZOLE SODIUM 40 MILLIGRAM(S): 20 TABLET, DELAYED RELEASE ORAL at 17:42

## 2018-10-20 RX ADMIN — GABAPENTIN 800 MILLIGRAM(S): 400 CAPSULE ORAL at 05:25

## 2018-10-20 RX ADMIN — Medication 2: at 12:28

## 2018-10-20 NOTE — DIETITIAN INITIAL EVALUATION ADULT. - PROBLEM SELECTOR PLAN 3
- chronic, stable  - pt follows GI (Dr. Morales) as outpt  - HOLD Lasix for NOW   - continue to monitor

## 2018-10-20 NOTE — DIETITIAN INITIAL EVALUATION ADULT. - DIET TYPE
consistent carbohydrate (evening snack)/DASH/TLC (sodium and cholesterol restricted diet)/60 gm protein

## 2018-10-20 NOTE — PROGRESS NOTE ADULT - PROBLEM SELECTOR PLAN 2
suspected JEAN  US 2/2018 noted, with liver lab w/u at that time, neg with exception of +asca/tammi  no free fluid on CT  for mri liver and mrcp  as outpt  cont ppi, sandra, rifaximin bid  cont lactulose bid, titrate for 3-4 soft bms/day  trend ammonia levels, trend lfts  monitor for bleeding  will follow

## 2018-10-20 NOTE — PROGRESS NOTE ADULT - SUBJECTIVE AND OBJECTIVE BOX
Patient is a 62y old  Male who presents with a chief complaint of abdominal pain (19 Oct 2018 15:41)      INTERVAL HPI:      OVERNIGHT EVENTS:    Home Medications:  Aspir 81 oral delayed release tablet: 1 tab(s) orally once a day (17 Oct 2018 20:22)  furosemide 20 mg oral tablet: 1 tab(s) orally once a day (17 Oct 2018 20:22)  gabapentin 800 mg oral tablet: orally 4 times a day (17 Oct 2018 20:22)  HumaLOG 100 units/mL subcutaneous solution: 20 unit(s) subcutaneous 2 times a day(breakfast and lunch) (17 Oct 2018 20:22)  HumaLOG 100 units/mL subcutaneous solution: 30 unit(s) subcutaneous once a day(@dinner) (17 Oct 2018 20:22)  irbesartan 300 mg oral tablet: 1 tab(s) orally once a day (17 Oct 2018 20:22)  metFORMIN 1000 mg oral tablet: 1 tab(s) orally 2 times a day (17 Oct 2018 20:22)  omeprazole 20 mg oral delayed release capsule: 1 cap(s) orally once a day (17 Oct 2018 20:22)  phytonadione 100 mcg oral tablet: 1 tab(s) orally once a day (17 Oct 2018 20:22)  pioglitazone 30 mg oral tablet: 1 tab(s) orally once a day (17 Oct 2018 20:22)  potassium chloride 10 mEq oral tablet, extended release: 1 tab(s) orally once a day (17 Oct 2018 20:22)  pravastatin 40 mg oral tablet: 1 tab(s) orally once a day (17 Oct 2018 20:22)  Toujeo SoloStar: 50 unit(s) subcutaneous once a day (at bedtime) (17 Oct 2018 20:22)  ursodiol 300 mg oral capsule: 1 cap(s) orally 3 times a day (17 Oct 2018 20:22)  Vitamin D3 2000 intl units oral tablet: 1 tab(s) orally once a day (17 Oct 2018 20:22)      MEDICATIONS  (STANDING):  dextrose 5%. 1000 milliLiter(s) (50 mL/Hr) IV Continuous <Continuous>  dextrose 50% Injectable 12.5 Gram(s) IV Push once  dextrose 50% Injectable 25 Gram(s) IV Push once  dextrose 50% Injectable 25 Gram(s) IV Push once  enoxaparin Injectable 40 milliGRAM(s) SubCutaneous daily  gabapentin 800 milliGRAM(s) Oral four times a day  insulin glargine Injectable (LANTUS) 50 Unit(s) SubCutaneous at bedtime  insulin lispro (HumaLOG) corrective regimen sliding scale   SubCutaneous three times a day before meals  insulin lispro Injectable (HumaLOG) 10 Unit(s) SubCutaneous three times a day before meals  lactulose Syrup 30 Gram(s) Oral two times a day  losartan 100 milliGRAM(s) Oral daily  pantoprazole    Tablet 40 milliGRAM(s) Oral two times a day  piperacillin/tazobactam IVPB. 3.375 Gram(s) IV Intermittent every 8 hours  rifaximin 550 milliGRAM(s) Oral two times a day  sodium chloride 0.9%. 1000 milliLiter(s) (75 mL/Hr) IV Continuous <Continuous>  sucralfate 1 Gram(s) Oral every 6 hours  ursodiol Capsule 300 milliGRAM(s) Oral three times a day    MEDICATIONS  (PRN):  dextrose 40% Gel 15 Gram(s) Oral once PRN Blood Glucose LESS THAN 70 milliGRAM(s)/deciliter  glucagon  Injectable 1 milliGRAM(s) IntraMuscular once PRN Glucose LESS THAN 70 milligrams/deciliter      Allergies    codeine (Anaphylaxis)    Intolerances        REVIEW OF SYSTEMS:  CONSTITUTIONAL: No fever, No chills, No fatigue, No myalgia, No Body ache, No Weakness  EYES: No eye pain,  No visual disturbances, No discharge, NO Redness  ENMT:  No ear pain, No nose bleed, No vertigo; No sinus or throat pain, No Congestion  NECK: No pain, No stiffness  RESPIRATORY: No cough, wheezing, No  hemoptysis, No shortness of breath  CARDIOVASCULAR: No chest pain, palpitations  GASTROINTESTINAL: No abdominal or epigastric pain. No nausea, No vomiting; No diarrhea or constipation. [  ] BM  GENITOURINARY: No dysuria, No frequency, No urgency, No hematuria, or incontinence  NEUROLOGICAL: No headaches, No dizziness, No numbness, No tingling, No tremors, No weakness  EXT: No Swelling, No Pain, No Edema  SKIN:  [  ] No itching, burning, rashes, or lesions   MUSCULOSKELETAL: No joint pain or swelling; No muscle pain, No back pain, No extremity pain  PSYCHIATRIC: No depression, anxiety, mood swings or difficulty sleeping at night  PAIN SCALE: [  ] None  [  ] Other-  ROS Unable to obtain due to - [  ] Dementia  [  ] Lethargy  [  ] Sedated [  ] non verbal  REST OF REVIEW Of SYSTEM - [  ] Normal     Vital Signs Last 24 Hrs  T(C): 36.8 (20 Oct 2018 05:17), Max: 36.8 (20 Oct 2018 05:17)  T(F): 98.2 (20 Oct 2018 05:17), Max: 98.2 (20 Oct 2018 05:17)  HR: 92 (20 Oct 2018 05:17) (87 - 92)  BP: 121/70 (20 Oct 2018 05:17) (121/63 - 155/81)  BP(mean): --  RR: 17 (20 Oct 2018 05:17) (17 - 18)  SpO2: 96% (20 Oct 2018 05:17) (95% - 96%)  Finger Stick        10- @ 07:01  -  10-20 @ 07:00  --------------------------------------------------------  IN: 2000 mL / OUT: 0 mL / NET: 2000 mL        PHYSICAL EXAM:  GENERAL:  [  ] NAD , [  ] well appearing, [  ] Agitated, [  ] Drowsy,  [  ] Lethargy, [  ] confused   HEAD:  [  ] Normal, [  ] Other  EYES:  [  ] EOMI, [  ] PERRLA, [  ] conjunctiva and sclera clear normal, [  ] Other,  [  ] Pallor,[  ] Discharge  ENMT:  [  ] Normal, [  ] Moist mucous membranes, [  ] Good dentition, [  ] No Thrush  NECK:  [  ] Supple, [  ] No JVD, [  ] Normal thyroid, [  ] Lymphadenopathy [  ] Other  CHEST/LUNG:  [  ] Clear to auscultation bilaterally, [  ] Breath Sounds equal OR Decrease,  [  ] No rales, [  ] No rhonchi  [  ]  No wheezing  HEART:  [  ] Regular rate and rhythm, [  ] tachycardia, [  ] Bradycardia,  [  ] irregular  [  ] No murmurs, No rubs, No gallops, [  ] PPM in place (Mfr:  )  ABDOMEN:  [  ] Soft, [  ] Nontender, [  ] Nondistended, [  ] No mass, [  ] Bowel sounds present, [  ] obese  NERVOUS SYSTEM:  [  ] Alert & Oriented X3, [  ] Nonfocal  [  ] Confusion  [  ] Encephalopathic [  ] Sedated [  ] Unable to assess, [  ] Other-  EXTREMITIES: [  ] 2+ Peripheral Pulses, No clubbing, No cyanosis,  [  ] edema B/L lower EXT. [  ] PVD stasis skin changes B/L Lower EXT  LYMPH: No lymphadenopathy noted  SKIN:  [  ] No rashes or lesions, [  ] Pressure Ulcers, [  ] ecchymosis, [  ] Skin Tears, [  ] Other    DIET:     LABS:                        12.3   3.89  )-----------( 93       ( 20 Oct 2018 09:15 )             36.5     20 Oct 2018 09:15    140    |  106    |  9      ----------------------------<  166    3.9     |  26     |  0.94     Ca    8.1        20 Oct 2018 09:15        Urinalysis Basic - ( 19 Oct 2018 09:57 )    Color: Yellow / Appearance: Clear / S.015 / pH: x  Gluc: x / Ketone: Negative  / Bili: Small / Urobili: 12   Blood: x / Protein: Negative / Nitrite: Negative   Leuk Esterase: Negative / RBC: 0-2 /HPF / WBC 0-2   Sq Epi: x / Non Sq Epi: Negative / Bacteria: Negative        Culture Results:   No growth to date. (10-19 @ 10:42)  Culture Results:   No growth to date. (10-19 @ 10:42)  Culture Results:   No growth to date. (10-17 @ 18:43)  Culture Results:   Growth in anaerobic bottle: Gram Variable Rods (10-17 @ 18:43)      culture blood  -- .Blood Blood 10-19 @ 10:42    culture urine  --  10-19 @ 10:42              Culture - Blood (collected 19 Oct 2018 10:42)  Source: .Blood Blood  Preliminary Report (20 Oct 2018 11:00):    No growth to date.    Culture - Blood (collected 19 Oct 2018 10:42)  Source: .Blood Blood  Preliminary Report (20 Oct 2018 11:00):    No growth to date.    Culture - Blood (collected 17 Oct 2018 18:43)  Source: .Blood Blood  Gram Stain (18 Oct 2018 23:09):    Growth in anaerobic bottle: Gram Variable Rods  Preliminary Report (18 Oct 2018 23:10):    Growth in anaerobic bottle: Gram Variable Rods    Culture - Blood (collected 17 Oct 2018 18:43)  Source: .Blood Blood  Preliminary Report (18 Oct 2018 19:01):    No growth to date.         Anemia Panel:      Thyroid Panel:        Amylase, Serum Total: 20 U/L (10-17-18 @ 15:11)  Lipase, Serum: 54 U/L (10-17-18 @ 15:11)          RADIOLOGY & ADDITIONAL TESTS:      HEALTH ISSUES - PROBLEM Dx:  Bacteremia: Bacteremia  Positive blood culture: Positive blood culture  Diabetes mellitus type 2, uncontrolled: Diabetes mellitus type 2, uncontrolled  Need for prophylactic measure: Need for prophylactic measure  Hypercholesteremia: Hypercholesteremia  Hepatic encephalopathy: Hepatic encephalopathy  Fatty liver disease, nonalcoholic: Fatty liver disease, nonalcoholic  Neuropathy: Neuropathy  Type 2 diabetes mellitus: Type 2 diabetes mellitus  Abdominal pain: Abdominal pain  Obesity: Obesity  Diabetes: Diabetes  Hypertension: Hypertension  Stone in kidney: Stone in kidney          Consultant(s) Notes Reviewed:  [  ] YES     Care Discussed with [X] Consultants  [  ] Patient  [  ] Family  [  ]   [  ] Social Service  [  ] RN, [  ] Physical Therapy  DVT PPX: [  ] Lovenox, [  ] S C Heparin, [  ] Coumadin, [  ] Xarelto, [  ] Eliquis, [  ] Pradaxa, [  ] IV Heparin drip, [  ] SCD [  ] Contraindication 2 to GI Bleed,[  ] Ambulation  Advanced directive: [  ] None, [  ] DNR/DNI Patient is a 62y old  Male who presents with a chief complaint of abdominal pain (19 Oct 2018 15:41)      INTERVAL HPI:  Pt is a 63 y/o M with PMHx of Type 2 DM, HTN, HLD, obesity, nephrolithiasis (last stone 25 years ago), neuropathy, HE, NAFLD, who presents with abdominal pain and vomiting for the past two days. Pt states he has had a decreased appetite since  evening along with non radiating intermittent epigastric abd pain rated 7/10. Yesterday he felt nauseous and vomited greenish fluid once. Today, he vomited 3 more times in the morning and went to Dr. Morales's office, who recommended pt get bloodwork and have a CT Abd/Pelvis. Pt states after leaving office, he started feeling nauseous and vomited once more before coming into ED. Admits epigastric abd pain, nausea, vomiting. Denies HA, dizziness, CP, SOB, diarrhea, or bloody stools. Denies recent travel or sick contacts.    In the ED, VS: T 98.4, HR 98, /77, RR 17, sat 99% RA, WBC 5.94, H/H 12.5/42.2, plat 108, Lactate 4.1, ammonia 73, Cl 112, CO2 21, BUN 13, Cr 0.71, Glu 189, Ca 7.5, prot 5.5, alb 2.1, bili 4.0, alk phos 165,AST 29, ALT 22, amylase 20, lipase 20, CT abd/pelvis - Impression may be associated with duodenitis and/or peptic ulcer disease; recommend further clinical correlation. 4 mm nonobstructing calcification at the lower pole the left kidney. CXR showing no acute pathology. EKG showing mild prolonged Qtc at 480.    OVERNIGHT EVENTS: NONE.    10/18/2018: Pt seen, examined. Continues to endorse epigastric, dull constant abdominal pain slightly improved. Also endorsing loose nonbloody BM x1 this AM. Denies nausea/vomiting, CP, SOB, urinary symptoms, melena, hematochezia. Awaiting EGD. Zofran held as Qtc 480. NPO for EGD, On Lactulose, drop in H/H    10/19/18: Overnight ++blood cx gram variable rods, blood cx sent, started on zosyn. ID Dr. REYNOSO called. EGD done, showing non bleeding esophageal ulcers, esophagitis, non bleeding duodenal ulcers, no EV/GV. Pt seen, examined. Denying abdominal pain, nausea/vomiting, diarrhea. No fevers, chills, night sweats. NO other complaints, per pt he could not have MRI abd/ MRCP due to his body habitus. Pt is on IV Zosyn . Follow Repeat blood culture.  10/20/18: Pt seen, Examined, No Complaints, wants to go home, Awaiting ID for gram Variable Rods, On IV Zosyn.     OVERNIGHT EVENTS: NONE    Home Medications:  Aspir 81 oral delayed release tablet: 1 tab(s) orally once a day (17 Oct 2018 20:22)  furosemide 20 mg oral tablet: 1 tab(s) orally once a day (17 Oct 2018 20:22)  gabapentin 800 mg oral tablet: orally 4 times a day (17 Oct 2018 20:22)  HumaLOG 100 units/mL subcutaneous solution: 20 unit(s) subcutaneous 2 times a day(breakfast and lunch) (17 Oct 2018 20:22)  HumaLOG 100 units/mL subcutaneous solution: 30 unit(s) subcutaneous once a day(@dinner) (17 Oct 2018 20:22)  irbesartan 300 mg oral tablet: 1 tab(s) orally once a day (17 Oct 2018 20:22)  metFORMIN 1000 mg oral tablet: 1 tab(s) orally 2 times a day (17 Oct 2018 20:22)  omeprazole 20 mg oral delayed release capsule: 1 cap(s) orally once a day (17 Oct 2018 20:22)  phytonadione 100 mcg oral tablet: 1 tab(s) orally once a day (17 Oct 2018 20:22)  pioglitazone 30 mg oral tablet: 1 tab(s) orally once a day (17 Oct 2018 20:22)  potassium chloride 10 mEq oral tablet, extended release: 1 tab(s) orally once a day (17 Oct 2018 20:22)  pravastatin 40 mg oral tablet: 1 tab(s) orally once a day (17 Oct 2018 20:22)  Toujeo SoloStar: 50 unit(s) subcutaneous once a day (at bedtime) (17 Oct 2018 20:22)  ursodiol 300 mg oral capsule: 1 cap(s) orally 3 times a day (17 Oct 2018 20:22)  Vitamin D3 2000 intl units oral tablet: 1 tab(s) orally once a day (17 Oct 2018 20:22)      MEDICATIONS  (STANDING):  dextrose 5%. 1000 milliLiter(s) (50 mL/Hr) IV Continuous <Continuous>  dextrose 50% Injectable 12.5 Gram(s) IV Push once  dextrose 50% Injectable 25 Gram(s) IV Push once  dextrose 50% Injectable 25 Gram(s) IV Push once  enoxaparin Injectable 40 milliGRAM(s) SubCutaneous daily  gabapentin 800 milliGRAM(s) Oral four times a day  insulin glargine Injectable (LANTUS) 50 Unit(s) SubCutaneous at bedtime  insulin lispro (HumaLOG) corrective regimen sliding scale   SubCutaneous three times a day before meals  insulin lispro Injectable (HumaLOG) 10 Unit(s) SubCutaneous three times a day before meals  lactulose Syrup 30 Gram(s) Oral two times a day  losartan 100 milliGRAM(s) Oral daily  pantoprazole    Tablet 40 milliGRAM(s) Oral two times a day  piperacillin/tazobactam IVPB. 3.375 Gram(s) IV Intermittent every 8 hours  rifaximin 550 milliGRAM(s) Oral two times a day  sodium chloride 0.9%. 1000 milliLiter(s) (75 mL/Hr) IV Continuous <Continuous>  sucralfate 1 Gram(s) Oral every 6 hours  ursodiol Capsule 300 milliGRAM(s) Oral three times a day    MEDICATIONS  (PRN):  dextrose 40% Gel 15 Gram(s) Oral once PRN Blood Glucose LESS THAN 70 milliGRAM(s)/deciliter  glucagon  Injectable 1 milliGRAM(s) IntraMuscular once PRN Glucose LESS THAN 70 milligrams/deciliter      Allergies    codeine (Anaphylaxis)    Intolerances        REVIEW OF SYSTEMS:  CONSTITUTIONAL: No fever, No chills, No fatigue, No myalgia, No Body ache, No Weakness  EYES: No eye pain,  No visual disturbances, No discharge, NO Redness  ENMT:  No ear pain, No nose bleed, No vertigo; No sinus or throat pain, No Congestion  NECK: No pain, No stiffness  RESPIRATORY: No cough, wheezing, No  hemoptysis, No shortness of breath  CARDIOVASCULAR: No chest pain, palpitations  GASTROINTESTINAL: No abdominal or epigastric pain. No nausea, No vomiting; No diarrhea or constipation. [x ] BM  GENITOURINARY: No dysuria, No frequency, No urgency, No hematuria, or incontinence  NEUROLOGICAL: No headaches, No dizziness, No numbness, No tingling, No tremors, No weakness  EXT: No Swelling, No Pain, No Edema  SKIN:  [ x ] No itching, burning, rashes, or lesions   MUSCULOSKELETAL: No joint pain or swelling; No muscle pain, No back pain, No extremity pain  PSYCHIATRIC: No depression, anxiety, mood swings or difficulty sleeping at night  PAIN SCALE: [ x ] None  [  ] Other-  ROS Unable to obtain due to - [  ] Dementia  [  ] Lethargy  [  ] Sedated [  ] non verbal  REST OF REVIEW Of SYSTEM - [ x ] Normal     Vital Signs Last 24 Hrs  T(C): 36.8 (20 Oct 2018 05:17), Max: 36.8 (20 Oct 2018 05:17)  T(F): 98.2 (20 Oct 2018 05:17), Max: 98.2 (20 Oct 2018 05:17)  HR: 92 (20 Oct 2018 05:17) (87 - 92)  BP: 121/70 (20 Oct 2018 05:17) (121/63 - 155/81)  BP(mean): --  RR: 17 (20 Oct 2018 05:17) (17 - 18)  SpO2: 96% (20 Oct 2018 05:17) (95% - 96%)  Finger Stick        10- @ 07:01  -  10-20 @ 07:00  --------------------------------------------------------  IN: 2000 mL / OUT: 0 mL / NET: 2000 mL        PHYSICAL EXAM:  GENERAL:  [ x ] NAD , [  x] well appearing, [  ] Agitated, [  ] Drowsy,  [  ] Lethargy, [  ] confused   HEAD:  [ x ] Normal, [  ] Other  EYES:  [ x ] EOMI, [ x ] PERRLA, [x  ] conjunctiva and sclera clear normal, [  ] Other,  [  ] Pallor,[  ] Discharge  ENMT:  [ x ] Normal, [ x ] Moist mucous membranes, [x  ] Good dentition, [ x ] No Thrush  NECK:  [x ] Supple, [ x ] No JVD, [ x ] Normal thyroid, [  ] Lymphadenopathy [  ] Other  CHEST/LUNG:  [ x ] Clear to auscultation bilaterally, [ x ] Breath Sounds equal B/L,  [ x ] No rales, [x  ] No rhonchi  [ x ]  No wheezing  HEART:  [x  ] Regular rate and rhythm, [  ] tachycardia, [  ] Bradycardia,  [  ] irregular  [x  ] No murmurs, No rubs, No gallops, [  ] PPM in place (Mfr:  )  ABDOMEN:  [ x ] Soft, [ x ] Nontender, [ x ] Nondistended, [ x ] No mass, [ x ] Bowel sounds present, [  x] obese  NERVOUS SYSTEM:  [ x ] Alert & Oriented X3, [ x ] Nonfocal  [  ] Confusion  [  ] Encephalopathic [  ] Sedated [  ] Unable to assess, [  ] Other-  EXTREMITIES: [ x ] 2+ Peripheral Pulses, No clubbing, No cyanosis,  [  ] edema B/L lower EXT. [  ] PVD stasis skin changes B/L Lower EXT  LYMPH: No lymphadenopathy noted  SKIN:  [ x ] No rashes or lesions, [  ] Pressure Ulcers, [  ] ecchymosis, [  ] Skin Tears, [  ] Other    DIET: Low Protein, Diabetic     LABS:                        12.3   3.89  )-----------( 93       ( 20 Oct 2018 09:15 )             36.5     20 Oct 2018 09:15    140    |  106    |  9      ----------------------------<  166    3.9     |  26     |  0.94     Ca    8.1        20 Oct 2018 09:15    Urinalysis Basic - ( 19 Oct 2018 09:57 )    Color: Yellow / Appearance: Clear / S.015 / pH: x  Gluc: x / Ketone: Negative  / Bili: Small / Urobili: 12   Blood: x / Protein: Negative / Nitrite: Negative   Leuk Esterase: Negative / RBC: 0-2 /HPF / WBC 0-2   Sq Epi: x / Non Sq Epi: Negative / Bacteria: Negative    Culture Results:   No growth to date. (10-19 @ 10:42)  Culture Results:   No growth to date. (10-19 @ 10:42)  Culture Results:   No growth to date. (10-17 @ 18:43)  Culture Results:   Growth in anaerobic bottle: Gram Variable Rods (10-17 @ 18:43)      culture blood  -- .Blood Blood 10-19 @ 10:42    culture urine  --  10 @ 10:42    Culture - Blood (collected 19 Oct 2018 10:42)  Source: .Blood Blood  Preliminary Report (20 Oct 2018 11:00):    No growth to date.    Culture - Blood (collected 19 Oct 2018 10:42)  Source: .Blood Blood  Preliminary Report (20 Oct 2018 11:00):    No growth to date.    Culture - Blood (collected 17 Oct 2018 18:43)  Source: .Blood Blood  Gram Stain (18 Oct 2018 23:09):    Growth in anaerobic bottle: Gram Variable Rods  Preliminary Report (18 Oct 2018 23:10):    Growth in anaerobic bottle: Gram Variable Rods    Culture - Blood (collected 17 Oct 2018 18:43)  Source: .Blood Blood  Preliminary Report (18 Oct 2018 19:01):    No growth to date.       Amylase, Serum Total: 20 U/L (10-17-18 @ 15:11)  Lipase, Serum: 54 U/L (10-17-18 @ 15:11)      RADIOLOGY & ADDITIONAL TESTS:  NONE    HEALTH ISSUES - PROBLEM Dx:  Bacteremia: Bacteremia  Positive blood culture: Positive blood culture  Diabetes mellitus type 2, uncontrolled: Diabetes mellitus type 2, uncontrolled  Need for prophylactic measure: Need for prophylactic measure  Hypercholesteremia: Hypercholesteremia  Hepatic encephalopathy: Hepatic encephalopathy  Fatty liver disease, nonalcoholic: Fatty liver disease, nonalcoholic  Neuropathy: Neuropathy  Type 2 diabetes mellitus: Type 2 diabetes mellitus  Abdominal pain: Abdominal pain  Obesity: Obesity  Diabetes: Diabetes  Hypertension: Hypertension  Stone in kidney: Stone in kidney    Consultant(s) Notes Reviewed:  [ x ] YES     Care Discussed with [X] Consultants  [ x ] Patient  [  ] Family  [  ]   [  ] Social Service  [ x ] RN, [  ] Physical Therapy  DVT PPX: [  ] Lovenox, [  ] S C Heparin, [  ] Coumadin, [  ] Xarelto, [  ] Eliquis, [  ] Pradaxa, [  ] IV Heparin drip, [ x ] SCD [  ] Contraindication 2 to GI Bleed,[  ] Ambulation  Advanced directive: [ x ] None, [  ] DNR/DNI

## 2018-10-20 NOTE — PROGRESS NOTE ADULT - PROBLEM SELECTOR PLAN 2
-Blood cx 10/17 growing gram variable rods. F/u 10/19 blood cultures. AFEBRILE NO LEUKOCYTOSIS.  -Started on zosyn  -ID dr. Rios called, awaiting reccs  -Repeat Blood culture- NEG so far

## 2018-10-20 NOTE — PROGRESS NOTE ADULT - SUBJECTIVE AND OBJECTIVE BOX
CAPILLARY BLOOD GLUCOSE      POCT Blood Glucose.: 202 mg/dL (20 Oct 2018 12:26)  POCT Blood Glucose.: 165 mg/dL (20 Oct 2018 08:03)  POCT Blood Glucose.: 149 mg/dL (19 Oct 2018 22:19)  POCT Blood Glucose.: 210 mg/dL (19 Oct 2018 17:05)      Vital Signs Last 24 Hrs  T(C): 36.8 (20 Oct 2018 05:17), Max: 36.8 (20 Oct 2018 05:17)  T(F): 98.2 (20 Oct 2018 05:17), Max: 98.2 (20 Oct 2018 05:17)  HR: 92 (20 Oct 2018 05:17) (87 - 92)  BP: 121/70 (20 Oct 2018 05:17) (121/63 - 121/70)  BP(mean): --  RR: 17 (20 Oct 2018 05:17) (17 - 18)  SpO2: 96% (20 Oct 2018 05:17) (96% - 96%)    General: WN/WD NAD  Respiratory: CTA B/L  CV: RRR, S1S2, no murmurs, rubs or gallops  Abdominal: Soft, NT, ND +BS, Last BM  Extremities: No edema, + peripheral pulses    Hemoglobin A1C, Whole Blood: 10.2 % (10-18 @ 10:50)   10-20    140  |  106  |  9   ----------------------------<  166<H>  3.9   |  26  |  0.94    Ca    8.1<L>      20 Oct 2018 09:15    TPro  6.4  /  Alb  2.5<L>  /  TBili  3.0<H>  /  DBili  1.30<H>  /  AST  40<H>  /  ALT  30  /  AlkPhos  191<H>  10-20      dextrose 40% Gel 15 Gram(s) Oral once PRN  dextrose 50% Injectable 12.5 Gram(s) IV Push once  dextrose 50% Injectable 25 Gram(s) IV Push once  dextrose 50% Injectable 25 Gram(s) IV Push once  glucagon  Injectable 1 milliGRAM(s) IntraMuscular once PRN  insulin glargine Injectable (LANTUS) 50 Unit(s) SubCutaneous at bedtime  insulin lispro (HumaLOG) corrective regimen sliding scale   SubCutaneous three times a day before meals  insulin lispro Injectable (HumaLOG) 10 Unit(s) SubCutaneous three times a day before meals

## 2018-10-20 NOTE — PROGRESS NOTE ADULT - PROBLEM SELECTOR PLAN 4
Uncontrolled  A1c 10.2- holding home medications: pioglitazone, metformin, toujeo,   - Endocrine Dr. C PErlman:cont lantus 50 units daily  cont Humalog 10 units tid before meals plus scale coverage  cont cons cho diet  goal bg 100-180 in hosp setting.

## 2018-10-20 NOTE — DIETITIAN INITIAL EVALUATION ADULT. - OTHER INFO
Pt seen for nutrition consult for nutrition assessment and education. As per chart pt is a 62 year old male with a PMH of Type 2 DM, HTN, HLD, obesity, nephrolithiasis (last stone 25 years ago), neuropathy, HE, NAFLD, who presents with abdominal pain and vomiting for the past two days, admitted for PUD with EGD done, Duodenal ulcers, No Bleeding , Blood cultures + Gram Variable rods. Pt reports currently good appetite and PO intake. Pt's admission weight per chart 308lbs. Pt reports current weight of 306lbs, reports UBW of 366lbs, states that he has lost about 60lbs over the past 5 months intentionally through diet modifications and exercise. Pt denies any chewing/ swallowing difficulties, denies any GI distress, +BM today. Pt with non edema or pressure injuries noted at this time.

## 2018-10-20 NOTE — PROGRESS NOTE ADULT - PROBLEM SELECTOR PLAN 1
cont lantus 50 units qhs  cont humalog 10 units tid before meals  cont humalog scale coverage  cont cons cho diet  goal bg 100-180 in hosp setting

## 2018-10-20 NOTE — DIETITIAN INITIAL EVALUATION ADULT. - NS AS NUTRI INTERV MEALS SNACK
Continue with current Consistent CHO w/evening snack, DASH/TLC, 60 g protein diet as medically feasible./General/healthful diet

## 2018-10-20 NOTE — DIETITIAN INITIAL EVALUATION ADULT. - PROBLEM SELECTOR PLAN 6
- holding home medications: pioglitazone, metformin, toujeo, Humalog  - insulin coverage scale w/hypoglycemia protocol  - Accu-Cheks q 6 hrs, Lantus 20 u SC Q HS  - Diet: clears, NPO after midnight for EGD in AM  - f/u AM HbA1c.

## 2018-10-20 NOTE — PROGRESS NOTE ADULT - SUBJECTIVE AND OBJECTIVE BOX
Patient is a 62y Male whom presented to the hospital with kidney stone     PAST MEDICAL & SURGICAL HISTORY:  Obesity  Fatty liver disease, nonalcoholic  Renal stones: 25 years ago  Hypertension  Neuropathy  Hypercholesteremia  Diabetes  S/P cholecystectomy      MEDICATIONS  (STANDING):  lactulose Syrup 30 Gram(s) Oral daily  pantoprazole  Injectable 40 milliGRAM(s) IV Push every 12 hours  rifaximin 550 milliGRAM(s) Oral two times a day  sodium chloride 0.9%. 1000 milliLiter(s) (75 mL/Hr) IV Continuous <Continuous>  sucralfate suspension 1 Gram(s) Oral two times a day          REVIEW OF SYSTEMS:    CONSTITUTIONAL: No weakness, fevers or chills  RESPIRATORY: No cough, wheezing, hemoptysis; no shortness of breath  CARDIOVASCULAR: No chest pain or palpitations  GASTROINTESTINAL: No abdominal or epigastric pain. pos  nausea, vomiting,     No diarrhea or constipation. No melena   SKIN: dry                          12.3   3.89  )-----------( 93       ( 20 Oct 2018 09:15 )             36.5       CBC Full  -  ( 20 Oct 2018 09:15 )  WBC Count : 3.89 K/uL  Hemoglobin : 12.3 g/dL  Hematocrit : 36.5 %  Platelet Count - Automated : 93 K/uL  Mean Cell Volume : 98.6 fl  Mean Cell Hemoglobin : 33.2 pg  Mean Cell Hemoglobin Concentration : 33.7 gm/dL  Auto Neutrophil # : x  Auto Lymphocyte # : x  Auto Monocyte # : x  Auto Eosinophil # : x  Auto Basophil # : x  Auto Neutrophil % : x  Auto Lymphocyte % : x  Auto Monocyte % : x  Auto Eosinophil % : x  Auto Basophil % : x      10-20    140  |  106  |  9   ----------------------------<  166<H>  3.9   |  26  |  0.94    Ca    8.1<L>      20 Oct 2018 09:15    TPro  6.4  /  Alb  2.5<L>  /  TBili  3.0<H>  /  DBili  1.30<H>  /  AST  40<H>  /  ALT  30  /  AlkPhos  191<H>  10-20      CAPILLARY BLOOD GLUCOSE      POCT Blood Glucose.: 202 mg/dL (20 Oct 2018 12:26)  POCT Blood Glucose.: 165 mg/dL (20 Oct 2018 08:03)  POCT Blood Glucose.: 149 mg/dL (19 Oct 2018 22:19)  POCT Blood Glucose.: 210 mg/dL (19 Oct 2018 17:05)      Vital Signs Last 24 Hrs  T(C): 36.6 (20 Oct 2018 14:45), Max: 36.8 (20 Oct 2018 05:17)  T(F): 97.8 (20 Oct 2018 14:45), Max: 98.2 (20 Oct 2018 05:17)  HR: 90 (20 Oct 2018 14:45) (87 - 92)  BP: 129/74 (20 Oct 2018 14:45) (121/63 - 129/74)  BP(mean): --  RR: 18 (20 Oct 2018 14:45) (17 - 18)  SpO2: 98% (20 Oct 2018 14:45) (96% - 98%)    Urinalysis Basic - ( 19 Oct 2018 09:57 )    Color: Yellow / Appearance: Clear / S.015 / pH: x  Gluc: x / Ketone: Negative  / Bili: Small / Urobili: 12   Blood: x / Protein: Negative / Nitrite: Negative   Leuk Esterase: Negative / RBC: 0-2 /HPF / WBC 0-2   Sq Epi: x / Non Sq Epi: Negative / Bacteria: Negative            PHYSICAL EXAM:    Constitutional: NAD  HEENT: conjunctive   clear   Neck:  No JVD  Respiratory: CTAB  Cardiovascular: S1 and S2  Gastrointestinal: BS+, soft, NT/ND  Extremities: No peripheral edema  Neurological: A/O x 3, no focal deficits  Psychiatric: Normal mood, normal affect  : No Beasley  Skin: No rashes  Access: Not applicable

## 2018-10-20 NOTE — PROGRESS NOTE ADULT - PROBLEM SELECTOR PLAN 7
- chronic, Unclear Etiology stable, High Bili   - GI following Dr Morales D/W Plan for MRI -Liver with IV Contrast, MRCP NON contrast in AM. .UNABLE TO BE PERFORMED 2/2 BODY HABITUS. Will f/u out pt  US 2/2018 noted, with liver lab w/u at that time, cont ppi, sandra, rifaximin bid  cont lactulose bid, titrate for 3-4 soft bms/day  trend ammonia levels, trend

## 2018-10-20 NOTE — PROGRESS NOTE ADULT - SUBJECTIVE AND OBJECTIVE BOX
ID progress note covering Dr. Rios    Name: JESSICA MARTIN  Age: 62y  Gender: Male  MRN: [id number]    Interval History-- Events noted, doing well, anxious to go home, admitted with severe abdominal pain , CT showed duodenitis and/or peptic ulcer disease , and he had EGD noted to have non bleeding esophageal ulcers, esophagitis, non bleeding duodenal ulcers. On Iv Zosyn for GVR bacteremia . he has been afebrile .  Notes reviewed    Past Medical History--  Hepatic encephalopathy  Obesity  Fatty liver disease, nonalcoholic  Renal stones  Hypertension  Neuropathy  Hypercholesteremia  Diabetes  S/P cholecystectomy  No significant past surgical history      For details regarding the patient's social history, family history, and other miscellaneous elements, please refer the initial infectious diseases consultation and/or the admitting history and physical examination for this admission.    Allergies--  Allergies    codeine (Anaphylaxis)    Intolerances        Medications--  Antibiotics:  piperacillin/tazobactam IVPB. 3.375 Gram(s) IV Intermittent every 8 hours  rifaximin 550 milliGRAM(s) Oral two times a day    Immunologic:    Other:  dextrose 40% Gel PRN  dextrose 5%.  dextrose 50% Injectable  dextrose 50% Injectable  dextrose 50% Injectable  enoxaparin Injectable  gabapentin  glucagon  Injectable PRN  insulin glargine Injectable (LANTUS)  insulin lispro (HumaLOG) corrective regimen sliding scale  insulin lispro Injectable (HumaLOG)  lactulose Syrup  losartan  pantoprazole    Tablet  sodium chloride 0.9%.  sucralfate  ursodiol Capsule      Review of Systems--  A 10-point review of systems was obtained.     Pertinent positives and negatives--  Constitutional: No fevers. No Chills. No Rigors.   Cardiovascular: No chest pain. No palpitations.  Respiratory: No shortness of breath. No cough.  Gastrointestinal: No nausea or vomiting. No diarrhea or constipation.   Psychiatric: Pleasant. Appropriate affect.    Review of systems otherwise negative except as previously noted.    Physical Examination--  Vital Signs: T(F): 97.8 (10-20-18 @ 14:45), Max: 98.2 (10-20-18 @ 05:17)  HR: 90 (10-20-18 @ 14:45)  BP: 129/74 (10-20-18 @ 14:45)  RR: 18 (10-20-18 @ 14:45)  SpO2: 98% (10-20-18 @ 14:45)  Wt(kg): --  General: Nontoxic-appearing morbidly  obese Male in no acute distress.  HEENT: AT/NC. PERRL. EOMI. Anicteric. Conjunctiva pink and moist. Oropharynx clear. Dentition fair.  Neck: Not rigid. No sense of mass.  Nodes: None palpable.  Lungs: Clear bilaterally without rales, wheezing or rhonchi  Heart: Regular rate and rhythm. No Murmur. No rub. No gallop. No palpable thrill.  Abdomen: Bowel sounds present and normoactive. Soft. Nondistended. Nontender. No sense of mass. No organomegaly.  Back: No spinal tenderness. No costovertebral angle tenderness.   Extremities: No cyanosis or clubbing. No edema.   Skin: Warm. Dry. Good turgor. No rash. No vasculitic stigmata.  Psychiatric: Appropriate affect and mood for situation.         Laboratory Studies--  CBC                        12.3   3.89  )-----------( 93       ( 20 Oct 2018 09:15 )             36.5       Chemistries  10-20    140  |  106  |  9   ----------------------------<  166<H>  3.9   |  26  |  0.94    Ca    8.1<L>      20 Oct 2018 09:15    TPro  6.4  /  Alb  2.5<L>  /  TBili  3.0<H>  /  DBili  1.30<H>  /  AST  40<H>  /  ALT  30  /  AlkPhos  191<H>  10-20        RECENT CULTURES    Culture - Blood (collected 19 Oct 2018 10:42)  Source: .Blood Blood  Preliminary Report (20 Oct 2018 11:00):    No growth to date.    Culture - Blood (collected 19 Oct 2018 10:42)  Source: .Blood Blood  Preliminary Report (20 Oct 2018 11:00):    No growth to date.    Culture - Blood (collected 17 Oct 2018 18:43)  Source: .Blood Blood  Gram Stain (18 Oct 2018 23:09):    Growth in anaerobic bottle: Gram Variable Rods  Preliminary Report (18 Oct 2018 23:10):    Growth in anaerobic bottle: Gram Variable Rods    Culture - Blood (collected 17 Oct 2018 18:43)  Source: .Blood Blood  Preliminary Report (18 Oct 2018 19:01):    No growth to date.        RADIOLOGY:  < from: CT Abdomen and Pelvis w/ IV Cont (10.17.18 @ 16:49) >  The patientis status post cholecystectomy common bile duct measures up   to 8 mm.  There are small coarse calcifications centrally within the right hepatic   lobe.    The spleen and adrenal glands appear unremarkable.    The pancreas is unremarkable in appearance.    There is a 4 mm nonobstructing calcification at the lower pole the left   kidney.  No hydroureteronephrosis is noted.    There is arteriosclerotic calcification of the abdominal aorta.  No enlarged retroperitoneal lymphadenopathy is noted.    Evaluation of the stomach and gastrointestinal tract is limited without   distention.  Stomach is fluid-filled. There is diffuse bowel wall thickening involving   the post bulbar duodenum, descending portion of the duodenum, associated   with periduodenal stranding.  Findings may be associated with duodenitis or peptic ulcer disease.  No bowel obstruction is noted.  No localized intra-abdominal fluid collection or pneumoperitoneum is   noted.    There is a small fat-containing periumbilical hernia.    The urinary bladder appears unremarkable.  No pelvic mass or free fluid is noted.  There is possible mild rectal wall thickening, correlate clinically for   proctitis or underlying rectal mass.    There are multilevel degenerative changes of the visualized spine.    Impression:    CT findings as discussed which may be associated with duodenitis and/or   peptic ulcer disease; recommend further clinical correlation.        Assessment--  Pt is a 61 YO M with PMHx obesity, DM 2, HTN, HLD, nephrolithiasis, neuropathy, poss JEAN  admitted with CC of 2 day hx of abdominal pain and vomiting found to have duodenitis, PUD  sp EGD which showed non bleeding esophageal ulcers, esophagitis, non bleeding duodenal   ulcers now with blood cultures from admission growing single set GVR.   Pt remained afebrile and wbc wnl.  Nontoxic  Bacteremia with GVR likely contaminant but will need to rule out true infection ?GNR    Plan:  - I called micro lab , still no ID on the GVR . May have ID tomorrow   - continue with IV Zosyn   - spoke with patient and Dr. RAMY Arreguin to hold dc till further ID  - repeat blood cs have been negative     Continue with present regiment .  Appropriate use of antibiotics and adverse effects reviewed.    I have discussed the above plan of care with patient in detail. He  expressed understanding of the treatment plan . Risks, benefits and alternatives discussed in detail. I have asked if he has  any questions or concerns and appropriately addressed them to the best of my ability .      > 25  minutes spent in direct patient care reviewing  the notes, lab data/ imaging , discussion with multidisciplinary team. All questions were addressed and answered to the best of my capacity .    Thank you for allowing me to participate in the care of your patient .        Brooklyn Arreguin MD  [phone]

## 2018-10-20 NOTE — DIETITIAN INITIAL EVALUATION ADULT. - PROBLEM SELECTOR PLAN 2
- currently asymptomatic, AAOx3. pt with hx of HE on last admission, likely 2/2 to NAFLD.  - elevated ammonia levels, pt states they are chronically elevated  - continue home lactulose. Start rifaximin 550mg BID as per GI (Dr. Morales)

## 2018-10-20 NOTE — PROGRESS NOTE ADULT - PROBLEM SELECTOR PLAN 1
-Resolving. S/p EGD yesterday: egd showed non bleeding esophageal ulcers, esophagitis, non bleeding duodenal ulcers, no EV/GV  - likely 2/2 PUD /gastritis pt states he takes 800mg ibuprofen every other night for neuropathic pain.  - CT Abd/Pelvis showing diffuse bowel wall thickening involving the post bulbar duodenum, descending portion of the duodenum, with danilo duodenal stranding. Findings may be associated with duodenitis or peptic ulcer disease. No bowel obstruction is noted.  - pt seen by GI (Dr. Morales/[facility]): ppi bid/carafate q6, f/u bxs,   - DASH/TLC  diet. Protonix 40mg BID . Carafate 4x day  - pt allergic to codeine. Pain control with Dilaudid 1mg given  - ECG showing mild Qtc prolongation on admission. Hold Zofran.  - Avoid QT prolonging meds.  -H/H stable  -Continued monitoring

## 2018-10-20 NOTE — DIETITIAN INITIAL EVALUATION ADULT. - PROBLEM SELECTOR PLAN 1
- admit to F  - likely 2/2 PUD , less likely renal stone, as pain is epigastric in nature. pt states he takes 800mg ibuprofen every other night for neuropathic pain.  - CT Abd/Pelvis showing diffuse bowel wall thickening involving the post bulbar duodenum, descending portion of the duodenum, with danilo duodenal stranding. Findings may be associated with duodenitis or peptic ulcer disease. No bowel obstruction is noted.  - pt seen by GI (Dr. Morales), plan for EGD in AM  - clear diet, NPO after midnight. Protonix 40mg BID IVPB. Carafate BID  - pt allergic to codeine. Pain control with Dilaudid 1mg q8h PRN.  - ECG showing mild Qtc prolongation. Hold Zofran. Avoid QT prolonging meds.

## 2018-10-20 NOTE — DIETITIAN INITIAL EVALUATION ADULT. - ADHERENCE
fair/Pt reports that he is trying to watch what he eats, does not consume any beef or red meat, and has been trying to consume more salads and fresh fruits, however he does not generally watch his CHO intake and will snack on sweets. Pt's supplement use PTA includes Vitamin B, iron. NKFA.

## 2018-10-20 NOTE — PROGRESS NOTE ADULT - PROBLEM SELECTOR PLAN 5
- currently asymptomatic, AAOx3. pt with hx of HE on last admission, likely 2/2 to NAFLD.  - elevated ammonia levels chronically elevated per patient  -GI (Dr. Franks team): US 2/2018 noted, with liver lab w/u at that time, neg with  cont ppi, sandra, rifaximin bid  cont lactulose bid, titrate for 3-4 soft bms/day  trend ammonia levels, trend   -MRCP/ liver MRI unable to be performed 2/2 patient body habitus; will follow up GI outpatient on d/c  -Trend daily labs

## 2018-10-20 NOTE — DIETITIAN INITIAL EVALUATION ADULT. - NS AS NUTRI INTERV ED CONTENT
Pt provided with extensive oral and written education on Consistent CHO diet. Educated pt on: 1) consuming consistent CHO throughout the day, 2) Consuming CHO with protein, 3) balanced meals, 4) consuming foods in smaller portions and moderation. All of the pt's questions were addressed and answered appropriately. Pt made aware of RD to remain available./Purpose of the nutrition education

## 2018-10-20 NOTE — PROGRESS NOTE ADULT - SUBJECTIVE AND OBJECTIVE BOX
INTERVAL HPI/OVERNIGHT EVENTS:  No new overnight event.  No N/V/D.  Tolerating diet.  positive blood cultires  Allergies    codeine (Anaphylaxis)    Intolerances          General:  No wt loss, fevers, chills, night sweats, fatigue,   Eyes:  Good vision, no reported pain  ENT:  No sore throat, pain, runny nose, dysphagia  CV:  No pain, palpitations, hypo/hypertension  Resp:  No dyspnea, cough, tachypnea, wheezing  GI:  No pain, No nausea, No vomiting, No diarrhea, No constipation, No weight loss, No fever, No pruritis, No rectal bleeding, No tarry stools, No dysphagia,  :  No pain, bleeding, incontinence, nocturia  Muscle:  No pain, weakness  Neuro:  No weakness, tingling, memory problems  Psych:  No fatigue, insomnia, mood problems, depression  Endocrine:  No polyuria, polydipsia, cold/heat intolerance  Heme:  No petechiae, ecchymosis, easy bruisability  Skin:  No rash, tattoos, scars, edema      PHYSICAL EXAM:   Vital Signs:  Vital Signs Last 24 Hrs  T(C): 36.6 (20 Oct 2018 14:45), Max: 36.8 (20 Oct 2018 05:17)  T(F): 97.8 (20 Oct 2018 14:45), Max: 98.2 (20 Oct 2018 05:17)  HR: 90 (20 Oct 2018 14:45) (87 - 92)  BP: 129/74 (20 Oct 2018 14:45) (121/63 - 129/74)  BP(mean): --  RR: 18 (20 Oct 2018 14:45) (17 - 18)  SpO2: 98% (20 Oct 2018 14:45) (96% - 98%)  Daily     Daily Weight in k.2 (20 Oct 2018 13:43)I&O's Summary    19 Oct 2018 07:  -  20 Oct 2018 07:00  --------------------------------------------------------  IN: 2000 mL / OUT: 0 mL / NET: 2000 mL    20 Oct 2018 07:01  -  20 Oct 2018 18:43  --------------------------------------------------------  IN: 850 mL / OUT: 0 mL / NET: 850 mL        GENERAL:  Appears stated age, well-groomed, well-nourished, no distress  HEENT:  NC/AT,  conjunctivae clear and pink, no thyromegaly, nodules, adenopathy, no JVD, sclera -anicteric  CHEST:  Full & symmetric excursion, no increased effort, breath sounds clear  HEART:  Regular rhythm, S1, S2, no murmur/rub/S3/S4, no abdominal bruit, no edema  ABDOMEN:  Soft, non-tender, non-distended, normoactive bowel sounds,  no masses ,no hepato-splenomegaly, no signs of chronic liver disease  EXTEREMITIES:  no cyanosis,clubbing or edema  SKIN:  No rash/erythema/ecchymoses/petechiae/wounds/abscess/warm/dry  NEURO:  Alert, oriented, no asterixis, no tremor, no encephalopathy      LABS:                        12.3   3.89  )-----------( 93       ( 20 Oct 2018 09:15 )             36.5     10-20    140  |  106  |  9   ----------------------------<  166<H>  3.9   |  26  |  0.94    Ca    8.1<L>      20 Oct 2018 09:15    TPro  6.4  /  Alb  2.5<L>  /  TBili  3.0<H>  /  DBili  1.30<H>  /  AST  40<H>  /  ALT  30  /  AlkPhos  191<H>  10-20      Urinalysis Basic - ( 19 Oct 2018 09:57 )    Color: Yellow / Appearance: Clear / S.015 / pH: x  Gluc: x / Ketone: Negative  / Bili: Small / Urobili: 12   Blood: x / Protein: Negative / Nitrite: Negative   Leuk Esterase: Negative / RBC: 0-2 /HPF / WBC 0-2   Sq Epi: x / Non Sq Epi: Negative / Bacteria: Negative      amylaseAmylase, Serum Total: 20 U/L (10- @ 15:11)     lipaseLipase, Serum: 54 U/L (10-17 @ 15:11)    RADIOLOGY & ADDITIONAL TESTS:

## 2018-10-20 NOTE — PROGRESS NOTE ADULT - PROBLEM SELECTOR PLAN 3
- chronic, stable   -elevated Bili -?? Etiology  - pt follows GI (Dr. Morales) outpt.  GI team called, reccs:US 2/2018 noted, with liver lab w/u at that time, neg with exception of  cont ppi, sandra, rifaximin bid  cont lactulose bid, titrate for 3-4 soft bms/day  - Lasix being held  -MRCP/ liver MRI unable to be performed 2/2 patient body habitus; will follow up GI outpatient on d/c  - Out pt work up

## 2018-10-20 NOTE — PROGRESS NOTE ADULT - ASSESSMENT
Pt is a 63 y/o M with PMHx of Type 2 DM, HTN, HLD, obesity, nephrolithiasis (last stone 25 years ago), neuropathy, HE, NAFLD, who presents with abdominal pain and vomiting for the past two days, admitted for PUD with EGD done, Duodenal ulcers, No Bleeding , Blood cultures + Gram Variable rods,

## 2018-10-20 NOTE — DIETITIAN INITIAL EVALUATION ADULT. - ORAL INTAKE PTA
fair/Pt reports poor appetite over the past week PTA, however states that his appetite is normally good.

## 2018-10-20 NOTE — DIETITIAN INITIAL EVALUATION ADULT. - PROBLEM SELECTOR PLAN 9
- pt with hx of renal stones (last stone 25 years ago, passed spontaneously)  - CT Abd/Pelvis showing 4mm non obstructing calcification in lower pole of L kidney  - pt seen by nephro (Dr. Naqvi),in ER

## 2018-10-21 RX ADMIN — LACTULOSE 30 GRAM(S): 10 SOLUTION ORAL at 06:00

## 2018-10-21 RX ADMIN — Medication 1: at 17:18

## 2018-10-21 RX ADMIN — Medication 10 UNIT(S): at 17:18

## 2018-10-21 RX ADMIN — GABAPENTIN 800 MILLIGRAM(S): 400 CAPSULE ORAL at 05:45

## 2018-10-21 RX ADMIN — Medication 10 UNIT(S): at 08:30

## 2018-10-21 RX ADMIN — LACTULOSE 30 GRAM(S): 10 SOLUTION ORAL at 17:02

## 2018-10-21 RX ADMIN — URSODIOL 300 MILLIGRAM(S): 250 TABLET, FILM COATED ORAL at 06:00

## 2018-10-21 RX ADMIN — GABAPENTIN 800 MILLIGRAM(S): 400 CAPSULE ORAL at 17:01

## 2018-10-21 RX ADMIN — Medication 10 UNIT(S): at 12:31

## 2018-10-21 RX ADMIN — Medication 1: at 12:31

## 2018-10-21 RX ADMIN — Medication 1 GRAM(S): at 17:01

## 2018-10-21 RX ADMIN — INSULIN GLARGINE 50 UNIT(S): 100 INJECTION, SOLUTION SUBCUTANEOUS at 22:26

## 2018-10-21 RX ADMIN — ENOXAPARIN SODIUM 40 MILLIGRAM(S): 100 INJECTION SUBCUTANEOUS at 11:35

## 2018-10-21 RX ADMIN — PIPERACILLIN AND TAZOBACTAM 25 GRAM(S): 4; .5 INJECTION, POWDER, LYOPHILIZED, FOR SOLUTION INTRAVENOUS at 22:25

## 2018-10-21 RX ADMIN — LOSARTAN POTASSIUM 100 MILLIGRAM(S): 100 TABLET, FILM COATED ORAL at 06:00

## 2018-10-21 RX ADMIN — PANTOPRAZOLE SODIUM 40 MILLIGRAM(S): 20 TABLET, DELAYED RELEASE ORAL at 05:45

## 2018-10-21 RX ADMIN — PIPERACILLIN AND TAZOBACTAM 25 GRAM(S): 4; .5 INJECTION, POWDER, LYOPHILIZED, FOR SOLUTION INTRAVENOUS at 05:45

## 2018-10-21 RX ADMIN — URSODIOL 300 MILLIGRAM(S): 250 TABLET, FILM COATED ORAL at 14:20

## 2018-10-21 RX ADMIN — Medication 1 GRAM(S): at 06:00

## 2018-10-21 RX ADMIN — Medication 1 GRAM(S): at 11:34

## 2018-10-21 RX ADMIN — PANTOPRAZOLE SODIUM 40 MILLIGRAM(S): 20 TABLET, DELAYED RELEASE ORAL at 17:01

## 2018-10-21 RX ADMIN — GABAPENTIN 800 MILLIGRAM(S): 400 CAPSULE ORAL at 11:35

## 2018-10-21 RX ADMIN — PIPERACILLIN AND TAZOBACTAM 25 GRAM(S): 4; .5 INJECTION, POWDER, LYOPHILIZED, FOR SOLUTION INTRAVENOUS at 15:23

## 2018-10-21 RX ADMIN — URSODIOL 300 MILLIGRAM(S): 250 TABLET, FILM COATED ORAL at 22:25

## 2018-10-21 NOTE — PROGRESS NOTE ADULT - PROBLEM SELECTOR PLAN 4
Uncontrolled  A1c 10.2- holding home medications: pioglitazone, metformin, toujeo,   - Endocrine Dr. C PErlman:cont lantus 50 units daily  cont Humalog 10 units tid before meals plus scale coverage  cont cons cho diet  goal bg 100-180 in hosp setting. Uncontrolled  A1c 10.2- holding home medications: pioglitazone, metformin, toujeo,   - Endocrine Dr. C PErlman:cont Lantus 50 units daily  cont Humalog 10 units tid before meals plus scale coverage  -

## 2018-10-21 NOTE — PROGRESS NOTE ADULT - SUBJECTIVE AND OBJECTIVE BOX
Patient is a 62y Male whom presented to the hospital with kidney stone   seen in am nad   PAST MEDICAL & SURGICAL HISTORY:  Obesity  Fatty liver disease, nonalcoholic  Renal stones: 25 years ago  Hypertension  Neuropathy  Hypercholesteremia  Diabetes  S/P cholecystectomy      MEDICATIONS  (STANDING):  lactulose Syrup 30 Gram(s) Oral daily  pantoprazole  Injectable 40 milliGRAM(s) IV Push every 12 hours  rifaximin 550 milliGRAM(s) Oral two times a day  sodium chloride 0.9%. 1000 milliLiter(s) (75 mL/Hr) IV Continuous <Continuous>  sucralfate suspension 1 Gram(s) Oral two times a day          REVIEW OF SYSTEMS:    CONSTITUTIONAL: No weakness, fevers or chills  RESPIRATORY: No cough, wheezing, hemoptysis; no shortness of breath  CARDIOVASCULAR: No chest pain or palpitations  GASTROINTESTINAL: No abdominal or epigastric pain. pos  nausea, vomiting,     No diarrhea or constipation. No melena   SKIN: dry                                     12.3   3.89  )-----------( 93       ( 20 Oct 2018 09:15 )             36.5       CBC Full  -  ( 20 Oct 2018 09:15 )  WBC Count : 3.89 K/uL  Hemoglobin : 12.3 g/dL  Hematocrit : 36.5 %  Platelet Count - Automated : 93 K/uL  Mean Cell Volume : 98.6 fl  Mean Cell Hemoglobin : 33.2 pg  Mean Cell Hemoglobin Concentration : 33.7 gm/dL  Auto Neutrophil # : x  Auto Lymphocyte # : x  Auto Monocyte # : x  Auto Eosinophil # : x  Auto Basophil # : x  Auto Neutrophil % : x  Auto Lymphocyte % : x  Auto Monocyte % : x  Auto Eosinophil % : x  Auto Basophil % : x      10-20    140  |  106  |  9   ----------------------------<  166<H>  3.9   |  26  |  0.94    Ca    8.1<L>      20 Oct 2018 09:15    TPro  6.4  /  Alb  2.5<L>  /  TBili  3.0<H>  /  DBili  1.30<H>  /  AST  40<H>  /  ALT  30  /  AlkPhos  191<H>  10-20      CAPILLARY BLOOD GLUCOSE      POCT Blood Glucose.: 153 mg/dL (21 Oct 2018 12:24)  POCT Blood Glucose.: 125 mg/dL (21 Oct 2018 08:02)  POCT Blood Glucose.: 179 mg/dL (20 Oct 2018 21:11)  POCT Blood Glucose.: 191 mg/dL (20 Oct 2018 17:16)      Vital Signs Last 24 Hrs  T(C): 36.6 (21 Oct 2018 13:19), Max: 36.7 (21 Oct 2018 04:58)  T(F): 97.8 (21 Oct 2018 13:19), Max: 98.1 (21 Oct 2018 04:58)  HR: 87 (21 Oct 2018 13:19) (87 - 89)  BP: 118/70 (21 Oct 2018 13:19) (104/64 - 128/73)  BP(mean): --  RR: 19 (21 Oct 2018 13:19) (17 - 20)  SpO2: 99% (21 Oct 2018 13:19) (93% - 99%)                     PHYSICAL EXAM:    Constitutional: NAD  HEENT: conjunctive   clear   Neck:  No JVD  Respiratory: CTAB  Cardiovascular: S1 and S2  Gastrointestinal: BS+, soft, NT/ND  Extremities: No peripheral edema  Neurological: A/O x 3, no focal deficits  Psychiatric: Normal mood, normal affect  : No Beasley  Skin: No rashes  Access: Not applicable

## 2018-10-21 NOTE — PROGRESS NOTE ADULT - PROBLEM SELECTOR PLAN 2
-Blood cx 10/17 growing gram variable rods. F/u 10/19 blood cultures. AFEBRILE NO LEUKOCYTOSIS.  -Started on zosyn  -ID dr. Rios called, awaiting reccs  -Repeat Blood culture- NEG so far -Resolved. S/p EGD - showed non bleeding esophageal ulcers, esophagitis, non bleeding duodenal ulcers, no varices   -Pain  2/2 PUD /gastritis pt states he takes 800mg ibuprofen every other night for neuropathic pain.  - CT Abd/Pelvis showing diffuse bowel wall thickening involving the post bulbar duodenum, descending portion of the duodenum, with daniol duodenal stranding. Findings may be associated with duodenitis or peptic ulcer disease. No bowel obstruction is noted.  - GI (Dr. Morales/[facility]): ppi bid/carafate q6, f/u bxs,   - DASH/TLC  diet. Protonix 40mg BID . Carafate 4x day  - ECG showing mild Qtc prolongation on admission. Hold Zofran.  - Avoid QT prolonging meds.  -H/H stable

## 2018-10-21 NOTE — PROGRESS NOTE ADULT - SUBJECTIVE AND OBJECTIVE BOX
Patient is a 62y old  Male who presents with a chief complaint of abdominal pain (20 Oct 2018 18:43)      INTERVAL HPI:  Pt is a 61 y/o M with PMHx of Type 2 DM, HTN, HLD, obesity, nephrolithiasis (last stone 25 years ago), neuropathy, HE, NAFLD, who presents with abdominal pain and vomiting for the past two days. Pt states he has had a decreased appetite since Sunday evening along with non radiating intermittent epigastric abd pain rated 7/10. Yesterday he felt nauseous and vomited greenish fluid once. Today, he vomited 3 more times in the morning and went to Dr. Morales's office, who recommended pt get bloodwork and have a CT Abd/Pelvis. Pt states after leaving office, he started feeling nauseous and vomited once more before coming into ED. Admits epigastric abd pain, nausea, vomiting. Denies HA, dizziness, CP, SOB, diarrhea, or bloody stools. Denies recent travel or sick contacts.    In the ED, VS: T 98.4, HR 98, /77, RR 17, sat 99% RA, WBC 5.94, H/H 12.5/42.2, plat 108, Lactate 4.1, ammonia 73, Cl 112, CO2 21, BUN 13, Cr 0.71, Glu 189, Ca 7.5, prot 5.5, alb 2.1, bili 4.0, alk phos 165,AST 29, ALT 22, amylase 20, lipase 20, CT abd/pelvis - Impression may be associated with duodenitis and/or peptic ulcer disease; recommend further clinical correlation. 4 mm non obstructing calcification at the lower pole the left kidney. CXR showing no acute pathology. EKG showing mild prolonged Qtc at 480.    10/19/18: Overnight ++blood cx gram variable rods, blood cx sent, started on zosyn. ID Dr. REYNOSO called. EGD done, showing non bleeding esophageal ulcers, esophagitis, non bleeding duodenal ulcers, no EV/GV. Pt seen, examined. Denying abdominal pain, nausea/vomiting, diarrhea. No fevers, chills, night sweats. NO other complaints, per pt he could not have MRI abd/ MRCP due to his body habitus. Pt is on IV Zosyn . Follow Repeat blood culture.  10/20/18: Pt seen, Examined, No Complaints, wants to go home, Awaiting ID for gram Variable Rods, On IV Zosyn.         OVERNIGHT EVENTS: NONE    Home Medications:  furosemide 20 mg oral tablet: 1 tab(s) orally once a day (17 Oct 2018 20:22)  HumaLOG 100 units/mL subcutaneous solution: 20 unit(s) subcutaneous 2 times a day(breakfast and lunch) (17 Oct 2018 20:22)  HumaLOG 100 units/mL subcutaneous solution: 30 unit(s) subcutaneous once a day(@dinner) (17 Oct 2018 20:22)  irbesartan 300 mg oral tablet: 1 tab(s) orally once a day (17 Oct 2018 20:22)  omeprazole 20 mg oral delayed release capsule: 1 cap(s) orally 2 times a day (20 Oct 2018 14:37)  phytonadione 100 mcg oral tablet: 1 tab(s) orally once a day (17 Oct 2018 20:22)  potassium chloride 10 mEq oral tablet, extended release: 1 tab(s) orally once a day (17 Oct 2018 20:22)  pravastatin 40 mg oral tablet: 1 tab(s) orally once a day (17 Oct 2018 20:22)  ursodiol 300 mg oral capsule: 1 cap(s) orally 3 times a day (17 Oct 2018 20:22)  Vitamin D3 2000 intl units oral tablet: 1 tab(s) orally once a day (17 Oct 2018 20:22)      MEDICATIONS  (STANDING):  dextrose 5%. 1000 milliLiter(s) (50 mL/Hr) IV Continuous <Continuous>  dextrose 50% Injectable 12.5 Gram(s) IV Push once  dextrose 50% Injectable 25 Gram(s) IV Push once  dextrose 50% Injectable 25 Gram(s) IV Push once  enoxaparin Injectable 40 milliGRAM(s) SubCutaneous daily  gabapentin 800 milliGRAM(s) Oral four times a day  insulin glargine Injectable (LANTUS) 50 Unit(s) SubCutaneous at bedtime  insulin lispro (HumaLOG) corrective regimen sliding scale   SubCutaneous three times a day before meals  insulin lispro Injectable (HumaLOG) 10 Unit(s) SubCutaneous three times a day before meals  lactulose Syrup 30 Gram(s) Oral two times a day  losartan 100 milliGRAM(s) Oral daily  pantoprazole    Tablet 40 milliGRAM(s) Oral two times a day  piperacillin/tazobactam IVPB. 3.375 Gram(s) IV Intermittent every 8 hours  rifaximin 550 milliGRAM(s) Oral two times a day  sucralfate 1 Gram(s) Oral every 6 hours  ursodiol Capsule 300 milliGRAM(s) Oral three times a day    MEDICATIONS  (PRN):  dextrose 40% Gel 15 Gram(s) Oral once PRN Blood Glucose LESS THAN 70 milliGRAM(s)/deciliter  glucagon  Injectable 1 milliGRAM(s) IntraMuscular once PRN Glucose LESS THAN 70 milligrams/deciliter      Allergies    codeine (Anaphylaxis)    Intolerances        REVIEW OF SYSTEMS:  CONSTITUTIONAL: No fever, No chills, No fatigue, No myalgia, No Body ache, No Weakness  EYES: No eye pain,  No visual disturbances, No discharge, NO Redness  ENMT:  No ear pain, No nose bleed, No vertigo; No sinus or throat pain, No Congestion  NECK: No pain, No stiffness  RESPIRATORY: No cough, wheezing, No  hemoptysis, No shortness of breath  CARDIOVASCULAR: No chest pain, palpitations  GASTROINTESTINAL: No abdominal or epigastric pain. No nausea, No vomiting; No diarrhea or constipation. [  ] BM  GENITOURINARY: No dysuria, No frequency, No urgency, No hematuria, or incontinence  NEUROLOGICAL: No headaches, No dizziness, No numbness, No tingling, No tremors, No weakness  EXT: No Swelling, No Pain, No Edema  SKIN:  [  ] No itching, burning, rashes, or lesions   MUSCULOSKELETAL: No joint pain or swelling; No muscle pain, No back pain, No extremity pain  PSYCHIATRIC: No depression, anxiety, mood swings or difficulty sleeping at night  PAIN SCALE: [  ] None  [  ] Other-  ROS Unable to obtain due to - [  ] Dementia  [  ] Lethargy  [  ] Sedated [  ] non verbal  REST OF REVIEW Of SYSTEM - [  ] Normal     Vital Signs Last 24 Hrs  T(C): 36.7 (21 Oct 2018 04:58), Max: 36.7 (21 Oct 2018 04:58)  T(F): 98.1 (21 Oct 2018 04:58), Max: 98.1 (21 Oct 2018 04:58)  HR: 89 (21 Oct 2018 04:58) (89 - 90)  BP: 128/73 (21 Oct 2018 04:58) (104/64 - 129/74)  BP(mean): --  RR: 17 (21 Oct 2018 04:58) (17 - 20)  SpO2: 96% (21 Oct 2018 04:58) (93% - 98%)  Finger Stick        10-20 @ 07:01  -  10-21 @ 07:00  --------------------------------------------------------  IN: 950 mL / OUT: 0 mL / NET: 950 mL        PHYSICAL EXAM:  GENERAL:  [  ] NAD , [  ] well appearing, [  ] Agitated, [  ] Drowsy,  [  ] Lethargy, [  ] confused   HEAD:  [  ] Normal, [  ] Other  EYES:  [  ] EOMI, [  ] PERRLA, [  ] conjunctiva and sclera clear normal, [  ] Other,  [  ] Pallor,[  ] Discharge  ENMT:  [  ] Normal, [  ] Moist mucous membranes, [  ] Good dentition, [  ] No Thrush  NECK:  [  ] Supple, [  ] No JVD, [  ] Normal thyroid, [  ] Lymphadenopathy [  ] Other  CHEST/LUNG:  [  ] Clear to auscultation bilaterally, [  ] Breath Sounds equal OR Decrease,  [  ] No rales, [  ] No rhonchi  [  ]  No wheezing  HEART:  [  ] Regular rate and rhythm, [  ] tachycardia, [  ] Bradycardia,  [  ] irregular  [  ] No murmurs, No rubs, No gallops, [  ] PPM in place (Mfr:  )  ABDOMEN:  [  ] Soft, [  ] Nontender, [  ] Nondistended, [  ] No mass, [  ] Bowel sounds present, [  ] obese  NERVOUS SYSTEM:  [  ] Alert & Oriented X3, [  ] Nonfocal  [  ] Confusion  [  ] Encephalopathic [  ] Sedated [  ] Unable to assess, [  ] Other-  EXTREMITIES: [  ] 2+ Peripheral Pulses, No clubbing, No cyanosis,  [  ] edema B/L lower EXT. [  ] PVD stasis skin changes B/L Lower EXT  LYMPH: No lymphadenopathy noted  SKIN:  [  ] No rashes or lesions, [  ] Pressure Ulcers, [  ] ecchymosis, [  ] Skin Tears, [  ] Other    DIET:     LABS:      Ca    8.1        20 Oct 2018 09:15            Culture Results:   No growth to date. (10-19 @ 10:42)  Culture Results:   No growth to date. (10-19 @ 10:42)  Culture Results:   No growth to date. (10-17 @ 18:43)  Culture Results:   Growth in anaerobic bottle: Gram Variable Rods (10-17 @ 18:43)      Culture - Blood (collected 19 Oct 2018 10:42)  Source: .Blood Blood  Preliminary Report (20 Oct 2018 11:00):    No growth to date.    Culture - Blood (collected 19 Oct 2018 10:42)  Source: .Blood Blood  Preliminary Report (20 Oct 2018 11:00):    No growth to date.    Culture - Blood (collected 17 Oct 2018 18:43)  Source: .Blood Blood  Gram Stain (18 Oct 2018 23:09):    Growth in anaerobic bottle: Gram Variable Rods  Preliminary Report (18 Oct 2018 23:10):    Growth in anaerobic bottle: Gram Variable Rods    Culture - Blood (collected 17 Oct 2018 18:43)  Source: .Blood Blood  Preliminary Report (18 Oct 2018 19:01):    No growth to date.      Amylase, Serum Total: 20 U/L (10-17-18 @ 15:11)  Lipase, Serum: 54 U/L (10-17-18 @ 15:11)    RADIOLOGY & ADDITIONAL TESTS:  NONE    HEALTH ISSUES - PROBLEM Dx:  Bacteremia: Bacteremia  Positive blood culture: Positive blood culture  Diabetes mellitus type 2, uncontrolled: Diabetes mellitus type 2, uncontrolled  Need for prophylactic measure: Need for prophylactic measure  Hypercholesteremia: Hypercholesteremia  Hepatic encephalopathy: Hepatic encephalopathy  Fatty liver disease, nonalcoholic: Fatty liver disease, nonalcoholic  Neuropathy: Neuropathy  Type 2 diabetes mellitus: Type 2 diabetes mellitus  Abdominal pain: Abdominal pain  Obesity: Obesity  Diabetes: Diabetes  Hypertension: Hypertension  Stone in kidney: Stone in kidney      Consultant(s) Notes Reviewed:  [ x ] YES     Care Discussed with [X] Consultants  [x  ] Patient  [  ] Family  [  ]   [  ] Social Service  [ x ] RN, [  ] Physical Therapy  DVT PPX: [ x ] Lovenox, [  ] S C Heparin, [  ] Coumadin, [  ] Xarelto, [  ] Eliquis, [  ] Pradaxa, [  ] IV Heparin drip, [  ] SCD [  ] Contraindication 2 to GI Bleed,[  ] Ambulation  Advanced directive: [ x ] None, [  ] DNR/DNI Patient is a 62y old  Male who presents with a chief complaint of abdominal pain (20 Oct 2018 18:43)      INTERVAL HPI:  Pt is a 61 y/o M with PMHx of Type 2 DM, HTN, HLD, obesity, nephrolithiasis (last stone 25 years ago), neuropathy, HE, NAFLD, who presents with abdominal pain and vomiting for the past two days. Pt states he has had a decreased appetite since Sunday evening along with non radiating intermittent epigastric abd pain rated 7/10. Yesterday he felt nauseous and vomited greenish fluid once. Today, he vomited 3 more times in the morning and went to Dr. Morales's office, who recommended pt get bloodwork and have a CT Abd/Pelvis. Pt states after leaving office, he started feeling nauseous and vomited once more before coming into ED. Admits epigastric abd pain, nausea, vomiting. Denies HA, dizziness, CP, SOB, diarrhea, or bloody stools. Denies recent travel or sick contacts.    In the ED, VS: T 98.4, HR 98, /77, RR 17, sat 99% RA, WBC 5.94, H/H 12.5/42.2, plat 108, Lactate 4.1, ammonia 73, Cl 112, CO2 21, BUN 13, Cr 0.71, Glu 189, Ca 7.5, prot 5.5, alb 2.1, bili 4.0, alk phos 165,AST 29, ALT 22, amylase 20, lipase 20, CT abd/pelvis - Impression may be associated with duodenitis and/or peptic ulcer disease; recommend further clinical correlation. 4 mm non obstructing calcification at the lower pole the left kidney. CXR showing no acute pathology. EKG showing mild prolonged Qtc at 480.    10/19/18: Overnight ++blood cx gram variable rods, blood cx sent, started on zosyn. ID Dr. REYNOSO called. EGD done, showing non bleeding esophageal ulcers, esophagitis, non bleeding duodenal ulcers, no EV/GV. Pt seen, examined. Denying abdominal pain, nausea/vomiting, diarrhea. No fevers, chills, night sweats. NO other complaints, per pt he could not have MRI abd/ MRCP due to his body habitus. Pt is on IV Zosyn . Follow Repeat blood culture.  10/20/18: Pt seen, Examined, No Complaints, wants to go home, Awaiting ID for gram Variable Rods, On IV Zosyn.   10/21/18: Pt seen, examined, Feels well, On IV Zosyn for Bacteremia with Gram variable rods In Blood cultures. No Complaints    OVERNIGHT EVENTS: NONE    Home Medications:  furosemide 20 mg oral tablet: 1 tab(s) orally once a day (17 Oct 2018 20:22)  HumaLOG 100 units/mL subcutaneous solution: 20 unit(s) subcutaneous 2 times a day(breakfast and lunch) (17 Oct 2018 20:22)  HumaLOG 100 units/mL subcutaneous solution: 30 unit(s) subcutaneous once a day(@dinner) (17 Oct 2018 20:22)  irbesartan 300 mg oral tablet: 1 tab(s) orally once a day (17 Oct 2018 20:22)  omeprazole 20 mg oral delayed release capsule: 1 cap(s) orally 2 times a day (20 Oct 2018 14:37)  phytonadione 100 mcg oral tablet: 1 tab(s) orally once a day (17 Oct 2018 20:22)  potassium chloride 10 mEq oral tablet, extended release: 1 tab(s) orally once a day (17 Oct 2018 20:22)  pravastatin 40 mg oral tablet: 1 tab(s) orally once a day (17 Oct 2018 20:22)  ursodiol 300 mg oral capsule: 1 cap(s) orally 3 times a day (17 Oct 2018 20:22)  Vitamin D3 2000 intl units oral tablet: 1 tab(s) orally once a day (17 Oct 2018 20:22)      MEDICATIONS  (STANDING):  dextrose 5%. 1000 milliLiter(s) (50 mL/Hr) IV Continuous <Continuous>  dextrose 50% Injectable 12.5 Gram(s) IV Push once  dextrose 50% Injectable 25 Gram(s) IV Push once  dextrose 50% Injectable 25 Gram(s) IV Push once  enoxaparin Injectable 40 milliGRAM(s) SubCutaneous daily  gabapentin 800 milliGRAM(s) Oral four times a day  insulin glargine Injectable (LANTUS) 50 Unit(s) SubCutaneous at bedtime  insulin lispro (HumaLOG) corrective regimen sliding scale   SubCutaneous three times a day before meals  insulin lispro Injectable (HumaLOG) 10 Unit(s) SubCutaneous three times a day before meals  lactulose Syrup 30 Gram(s) Oral two times a day  losartan 100 milliGRAM(s) Oral daily  pantoprazole    Tablet 40 milliGRAM(s) Oral two times a day  piperacillin/tazobactam IVPB. 3.375 Gram(s) IV Intermittent every 8 hours  rifaximin 550 milliGRAM(s) Oral two times a day  sucralfate 1 Gram(s) Oral every 6 hours  ursodiol Capsule 300 milliGRAM(s) Oral three times a day    MEDICATIONS  (PRN):  dextrose 40% Gel 15 Gram(s) Oral once PRN Blood Glucose LESS THAN 70 milliGRAM(s)/deciliter  glucagon  Injectable 1 milliGRAM(s) IntraMuscular once PRN Glucose LESS THAN 70 milligrams/deciliter      Allergies    codeine (Anaphylaxis)    Intolerances        REVIEW OF SYSTEMS:  CONSTITUTIONAL: No fever, No chills, No fatigue, No myalgia, No Body ache, No Weakness  EYES: No eye pain,  No visual disturbances, No discharge, NO Redness  ENMT:  No ear pain, No nose bleed, No vertigo; No sinus or throat pain, No Congestion  NECK: No pain, No stiffness  RESPIRATORY: No cough, wheezing, No  hemoptysis, No shortness of breath  CARDIOVASCULAR: No chest pain, palpitations  GASTROINTESTINAL: No abdominal or epigastric pain. No nausea, No vomiting; No diarrhea or constipation. [ x ] BM  GENITOURINARY: No dysuria, No frequency, No urgency, No hematuria, or incontinence  NEUROLOGICAL: No headaches, No dizziness, No numbness, No tingling, No tremors, No weakness  EXT: No Swelling, No Pain, No Edema  SKIN:  [ x ] No itching, burning, rashes, or lesions   MUSCULOSKELETAL: No joint pain or swelling; No muscle pain, No back pain, No extremity pain  PSYCHIATRIC: No depression, anxiety, mood swings or difficulty sleeping at night  PAIN SCALE: [ x ] None  [  ] Other-  ROS Unable to obtain due to - [  ] Dementia  [  ] Lethargy  [  ] Sedated [  ] non verbal  REST OF REVIEW Of SYSTEM - [ x ] Normal     Vital Signs Last 24 Hrs  T(C): 36.7 (21 Oct 2018 04:58), Max: 36.7 (21 Oct 2018 04:58)  T(F): 98.1 (21 Oct 2018 04:58), Max: 98.1 (21 Oct 2018 04:58)  HR: 89 (21 Oct 2018 04:58) (89 - 90)  BP: 128/73 (21 Oct 2018 04:58) (104/64 - 129/74)  BP(mean): --  RR: 17 (21 Oct 2018 04:58) (17 - 20)  SpO2: 96% (21 Oct 2018 04:58) (93% - 98%)  Finger Stick        10-20 @ 07:01  -  10-21 @ 07:00  --------------------------------------------------------  IN: 950 mL / OUT: 0 mL / NET: 950 mL        PHYSICAL EXAM:  GENERAL:  [x  ] NAD , [ x ] well appearing, [  ] Agitated, [  ] Drowsy,  [  ] Lethargy, [  ] confused   HEAD:  [x  ] Normal, [  ] Other  EYES:  [ x ] EOMI, [ x ] PERRLA, [ x ] conjunctiva and sclera clear normal, [  ] Other,  [  ] Pallor,[  ] Discharge  ENMT:  [ x ] Normal, [ x ] Moist mucous membranes, [ x ] Good dentition, [ x ] No Thrush  NECK:  [x  ] Supple, [x  ] No JVD, [x  ] Normal thyroid, [  ] Lymphadenopathy [  ] Other  CHEST/LUNG:  [ x ] Clear to auscultation bilaterally, [ x ] Breath Sounds equal B/L,  [ x ] No rales, [ x ] No rhonchi  [x  ]  No wheezing  HEART:  [ x ] Regular rate and rhythm, [ x ] tachycardia, [  ] Bradycardia,  [  ] irregular  [  ] No murmurs, No rubs, No gallops, [  ] PPM in place (Mfr:  )  ABDOMEN:  [x ] Soft, [ x ] Nontender, [ x ] Nondistended, [ x ] No mass, [ x ] Bowel sounds present, [ x ] obese  NERVOUS SYSTEM:  [  ] Alert & Oriented X3, [ x ] Nonfocal  [  ] Confusion  [  ] Encephalopathic [  ] Sedated [  ] Unable to assess, [  ] Other-  EXTREMITIES: [ x ] 2+ Peripheral Pulses, No clubbing, No cyanosis,  [  ] edema B/L lower EXT. [  ] PVD stasis skin changes B/L Lower EXT  LYMPH: No lymphadenopathy noted  SKIN:  [ x ] No rashes or lesions, [  ] Pressure Ulcers, [  ] ecchymosis, [  ] Skin Tears, [  ] Other    DIET: Low protein DIabetic    LABS:      Ca    8.1        20 Oct 2018 09:15    Culture Results:   No growth to date. (10-19 @ 10:42)  Culture Results:   No growth to date. (10-19 @ 10:42)  Culture Results:   No growth to date. (10-17 @ 18:43)  Culture Results:   Growth in anaerobic bottle: Gram Variable Rods (10-17 @ 18:43)      Culture - Blood (collected 19 Oct 2018 10:42)  Source: .Blood Blood  Preliminary Report (20 Oct 2018 11:00):    No growth to date.    Culture - Blood (collected 19 Oct 2018 10:42)  Source: .Blood Blood  Preliminary Report (20 Oct 2018 11:00):    No growth to date.    Culture - Blood (collected 17 Oct 2018 18:43)  Source: .Blood Blood  Gram Stain (18 Oct 2018 23:09):    Growth in anaerobic bottle: Gram Variable Rods  Preliminary Report (18 Oct 2018 23:10):    Growth in anaerobic bottle: Gram Variable Rods    Culture - Blood (collected 17 Oct 2018 18:43)  Source: .Blood Blood  Preliminary Report (18 Oct 2018 19:01):    No growth to date.      Amylase, Serum Total: 20 U/L (10-17-18 @ 15:11)  Lipase, Serum: 54 U/L (10-17-18 @ 15:11)      Surgical Pathology Final Report -  (11.06.14 @ 11:54)    Surgical Pathology Final Report - :   ACCESSION No:  [id number]    JESSICA MARTIN        Surgical Final Report      Final Diagnosis  1. Stomach, antrum, biopsy:  - Antral type gastric mucosa with reactive (chemical)  gastropathy and mild chronic inactive gastritis.  - Immunohistochemical stain is negative H. Pylori.    2. Stomach, body, biopsy:  - Oxyntic type gastric mucosa with mild chronic inactive  gastritis.  - Immunohistochemical stain is negative for H. pylori.    H. Pylori positive and negative controls were reviewed.  H. Pylori test and its performance characteristics have been  evaluated by [facility].    It has not been cleared or approved by the FDA.  The FDA has  determined that such clearance or approval is not necessary.  The  laboratory is regulated under the Clinical Laboratory Improvement  Amendments of 1988 (CLIA) as qualified to perform high complexity  testing.    Maine Matos MD  (Electronic Signature)  Reported on: 11/07/14    Clinical History  Gastritis    Specimen(s) Submitted  1     Antrum biopsy  2     Stomach biopsy, gastric body    Gross Description  1.  The specimen is received in formalin labeled: "Biopsy antrum  "and consists of a fragment of soft pink-tan tissue measuring 0.5  cm greatest dimension.  Entirely submitted in one cassette.    2.  The specimen is received in formal labeled: "Gastric body  "and consists of two fragments of soft pink-tan tissue measuring  0.4 cm in greatest dimension each.  Entirely submitted in one  cassette.    In addition to other data that may appear on the specimen  containers, all labels have been inspected to confirm the  presence of the patient's name and date of birth.    JS 11/06/14 12:13      RADIOLOGY & ADDITIONAL TESTS:  NONE    HEALTH ISSUES - PROBLEM Dx:  Bacteremia: Bacteremia  Positive blood culture: Positive blood culture  Diabetes mellitus type 2, uncontrolled: Diabetes mellitus type 2, uncontrolled  Need for prophylactic measure: Need for prophylactic measure  Hypercholesteremia: Hypercholesteremia  Hepatic encephalopathy: Hepatic encephalopathy  Fatty liver disease, nonalcoholic: Fatty liver disease, nonalcoholic  Neuropathy: Neuropathy  Type 2 diabetes mellitus: Type 2 diabetes mellitus  Abdominal pain: Abdominal pain  Obesity: Obesity  Diabetes: Diabetes  Hypertension: Hypertension  Stone in kidney: Stone in kidney      Consultant(s) Notes Reviewed:  [ x ] YES     Care Discussed with [X] Consultants  [x  ] Patient  [  ] Family  [  ]   [  ] Social Service  [ x ] RN, [  ] Physical Therapy  DVT PPX: [ x ] Lovenox, [  ] S C Heparin, [  ] Coumadin, [  ] Xarelto, [  ] Eliquis, [  ] Pradaxa, [  ] IV Heparin drip, [  ] SCD [  ] Contraindication 2 to GI Bleed,[  ] Ambulation  Advanced directive: [ x ] None, [  ] DNR/DNI

## 2018-10-21 NOTE — PROGRESS NOTE ADULT - ASSESSMENT
Pt is a 61 y/o M with PMHx of Type 2 DM, HTN, HLD, obesity, nephrolithiasis (last stone 25 years ago), neuropathy, HE, NAFLD, who presents with abdominal pain and vomiting for the past two days, admitted for PUD with EGD done, Duodenal ulcers, No Bleeding , Blood cultures + Gram Variable rods, Pt is a 63 y/o M with PMHx of Type 2 DM, HTN, HLD, obesity, nephrolithiasis (last stone 25 years ago), neuropathy, HE, NAFLD, who presents with abdominal pain and vomiting for the past two days, admitted for PUD with EGD done, Duodenal ulcers, No Bleeding , Blood cultures + Gram Variable rods,On IV Zosyn

## 2018-10-21 NOTE — PROGRESS NOTE ADULT - ASSESSMENT
Pt is a 63 y/o M with PMHx of Type 2 DM, HTN, HLD, obesity, nephrolithiasis (last stone 25 years ago), neuropathy, HE, NAFLD, who presents with abdominal pain and vomiting for the past two days. gi consulted for abd pain

## 2018-10-21 NOTE — PROGRESS NOTE ADULT - PROBLEM SELECTOR PLAN 7
- chronic, Unclear Etiology stable, High Bili   - GI following Dr Morales D/W Plan for MRI -Liver with IV Contrast, MRCP NON contrast in AM. .UNABLE TO BE PERFORMED 2/2 BODY HABITUS. Will f/u out pt  US 2/2018 noted, with liver lab w/u at that time, cont ppi, sandra, rifaximin bid  cont lactulose bid, titrate for 3-4 soft bms/day  trend ammonia levels, trend - chronic, Unclear Etiology stable, High Bili   - GI following Dr Morales D/W for MRI -Liver with IV Contrast, MRCP NON  Con as out pt .UNABLE TO BE PERFORMED 2/2 BODY HABITUS.   -, cont ppi, sandra, rifaximin bid  cont lactulose bid, titrate for 3-4 soft bms/day

## 2018-10-21 NOTE — PROGRESS NOTE ADULT - PROBLEM SELECTOR PLAN 3
- chronic, stable   -elevated Bili -?? Etiology  - pt follows GI (Dr. Morales) outpt.  GI team called, reccs:US 2/2018 noted, with liver lab w/u at that time, neg with exception of  cont ppi, sandra, rifaximin bid  cont lactulose bid, titrate for 3-4 soft bms/day  - Lasix being held  -MRCP/ liver MRI unable to be performed 2/2 patient body habitus; will follow up GI outpatient on d/c  - Out pt work up - chronic, stable   -elevated Bili -?? Etiology  - pt follows GI (Dr. Morales) outpt.  -cont ppi, sandra, rifaximin bid  cont lactulose bid, titrate for 3-4 soft bms/day  - Lasix being held  -MRCP/ liver MRI unable to be performed 2/2 patient body habitus; will follow up GI outpatient on d/c

## 2018-10-21 NOTE — PROGRESS NOTE ADULT - PROBLEM SELECTOR PLAN 5
- currently asymptomatic, AAOx3. pt with hx of HE on last admission, likely 2/2 to NAFLD.  - elevated ammonia levels chronically elevated per patient  -GI (Dr. Franks team): US 2/2018 noted, with liver lab w/u at that time, neg with  cont ppi, sandra, rifaximin bid  cont lactulose bid, titrate for 3-4 soft bms/day  trend ammonia levels, trend   -MRCP/ liver MRI unable to be performed 2/2 patient body habitus; will follow up GI outpatient on d/c  -Trend daily labs - currently asymptomatic, AAOx3. pt with hx of HE on last admission, likely 2/2 to NAFLD.  - elevated ammonia levels chronically elevated per patient  -GI (Dr. Franks team): US 2/2018 , cont ppi, sandra, rifaximin bid  cont lactulose bid, titrate for 3-4 soft bms/day  trend ammonia levels, trend   -MRCP/ liver MRI unable to be performed 2/2 patient body habitus; will follow up

## 2018-10-21 NOTE — PROGRESS NOTE ADULT - SUBJECTIVE AND OBJECTIVE BOX
INTERVAL HPI/OVERNIGHT EVENTS:  No new overnight event.  No N/V/D.  Tolerating diet.      Allergies    codeine (Anaphylaxis)    Intolerances          General:  No wt loss, fevers, chills, night sweats, fatigue,   Eyes:  Good vision, no reported pain  ENT:  No sore throat, pain, runny nose, dysphagia  CV:  No pain, palpitations, hypo/hypertension  Resp:  No dyspnea, cough, tachypnea, wheezing  GI:  No pain, No nausea, No vomiting, No diarrhea, No constipation, No weight loss, No fever, No pruritis, No rectal bleeding, No tarry stools, No dysphagia,  :  No pain, bleeding, incontinence, nocturia  Muscle:  No pain, weakness  Neuro:  No weakness, tingling, memory problems  Psych:  No fatigue, insomnia, mood problems, depression  Endocrine:  No polyuria, polydipsia, cold/heat intolerance  Heme:  No petechiae, ecchymosis, easy bruisability  Skin:  No rash, tattoos, scars, edema      PHYSICAL EXAM:   Vital Signs:  Vital Signs Last 24 Hrs  T(C): 36.6 (21 Oct 2018 13:19), Max: 36.7 (21 Oct 2018 04:58)  T(F): 97.8 (21 Oct 2018 13:19), Max: 98.1 (21 Oct 2018 04:58)  HR: 87 (21 Oct 2018 13:19) (87 - 90)  BP: 118/70 (21 Oct 2018 13:19) (104/64 - 129/74)  BP(mean): --  RR: 19 (21 Oct 2018 13:19) (17 - 20)  SpO2: 99% (21 Oct 2018 13:19) (93% - 99%)  Daily     Daily I&O's Summary    20 Oct 2018 07:01  -  21 Oct 2018 07:00  --------------------------------------------------------  IN: 950 mL / OUT: 0 mL / NET: 950 mL        GENERAL:  Appears stated age, well-groomed, well-nourished, no distress  HEENT:  NC/AT,  conjunctivae clear and pink, no thyromegaly, nodules, adenopathy, no JVD, sclera -anicteric  CHEST:  Full & symmetric excursion, no increased effort, breath sounds clear  HEART:  Regular rhythm, S1, S2, no murmur/rub/S3/S4, no abdominal bruit, no edema  ABDOMEN:  Soft, non-tender, non-distended, normoactive bowel sounds,  no masses ,no hepato-splenomegaly, no signs of chronic liver disease  EXTEREMITIES:  no cyanosis,clubbing or edema  SKIN:  No rash/erythema/ecchymoses/petechiae/wounds/abscess/warm/dry  NEURO:  Alert, oriented, no asterixis, no tremor, no encephalopathy      LABS:                        12.3   3.89  )-----------( 93       ( 20 Oct 2018 09:15 )             36.5     10-20    140  |  106  |  9   ----------------------------<  166<H>  3.9   |  26  |  0.94    Ca    8.1<L>      20 Oct 2018 09:15    TPro  6.4  /  Alb  2.5<L>  /  TBili  3.0<H>  /  DBili  1.30<H>  /  AST  40<H>  /  ALT  30  /  AlkPhos  191<H>  10-20        amylaseAmylase, Serum Total: 20 U/L (10-17 @ 15:11)     lipaseLipase, Serum: 54 U/L (10-17 @ 15:11)    RADIOLOGY & ADDITIONAL TESTS:

## 2018-10-21 NOTE — PROGRESS NOTE ADULT - PROBLEM SELECTOR PLAN 1
-Resolving. S/p EGD yesterday: egd showed non bleeding esophageal ulcers, esophagitis, non bleeding duodenal ulcers, no EV/GV  - likely 2/2 PUD /gastritis pt states he takes 800mg ibuprofen every other night for neuropathic pain.  - CT Abd/Pelvis showing diffuse bowel wall thickening involving the post bulbar duodenum, descending portion of the duodenum, with danilo duodenal stranding. Findings may be associated with duodenitis or peptic ulcer disease. No bowel obstruction is noted.  - pt seen by GI (Dr. Morales/[facility]): ppi bid/carafate q6, f/u bxs,   - DASH/TLC  diet. Protonix 40mg BID . Carafate 4x day  - pt allergic to codeine. Pain control with Dilaudid 1mg given  - ECG showing mild Qtc prolongation on admission. Hold Zofran.  - Avoid QT prolonging meds.  -H/H stable  -Continued monitoring -Blood cx 10/17 growing gram variable rods. F/u 10/19 blood cultures. AFEBRILE NO LEUKOCYTOSIS.  - on  IV zosyn q 8 hrs  -ID dr. Rios/ Dr megan stephenson -to follow culture I[place].  -Repeat Blood culture- NEG so far

## 2018-10-22 LAB
ALBUMIN SERPL ELPH-MCNC: 2.4 G/DL — LOW (ref 3.3–5)
ALP SERPL-CCNC: 185 U/L — HIGH (ref 40–120)
ALT FLD-CCNC: 28 U/L — SIGNIFICANT CHANGE UP (ref 12–78)
AMMONIA BLD-MCNC: 99 UMOL/L — HIGH (ref 11–32)
ANION GAP SERPL CALC-SCNC: 6 MMOL/L — SIGNIFICANT CHANGE UP (ref 5–17)
AST SERPL-CCNC: 38 U/L — HIGH (ref 15–37)
BILIRUB SERPL-MCNC: 2.6 MG/DL — HIGH (ref 0.2–1.2)
BUN SERPL-MCNC: 7 MG/DL — SIGNIFICANT CHANGE UP (ref 7–23)
CALCIUM SERPL-MCNC: 8.4 MG/DL — LOW (ref 8.5–10.1)
CHLORIDE SERPL-SCNC: 106 MMOL/L — SIGNIFICANT CHANGE UP (ref 96–108)
CO2 SERPL-SCNC: 29 MMOL/L — SIGNIFICANT CHANGE UP (ref 22–31)
CREAT SERPL-MCNC: 0.85 MG/DL — SIGNIFICANT CHANGE UP (ref 0.5–1.3)
CULTURE RESULTS: SIGNIFICANT CHANGE UP
CULTURE RESULTS: SIGNIFICANT CHANGE UP
GLUCOSE SERPL-MCNC: 200 MG/DL — HIGH (ref 70–99)
HCT VFR BLD CALC: 35.4 % — LOW (ref 39–50)
HGB BLD-MCNC: 12 G/DL — LOW (ref 13–17)
MCHC RBC-ENTMCNC: 33.3 PG — SIGNIFICANT CHANGE UP (ref 27–34)
MCHC RBC-ENTMCNC: 33.9 GM/DL — SIGNIFICANT CHANGE UP (ref 32–36)
MCV RBC AUTO: 98.3 FL — SIGNIFICANT CHANGE UP (ref 80–100)
NRBC # BLD: 0 /100 WBCS — SIGNIFICANT CHANGE UP (ref 0–0)
PLATELET # BLD AUTO: 83 K/UL — LOW (ref 150–400)
POTASSIUM SERPL-MCNC: 3.9 MMOL/L — SIGNIFICANT CHANGE UP (ref 3.5–5.3)
POTASSIUM SERPL-SCNC: 3.9 MMOL/L — SIGNIFICANT CHANGE UP (ref 3.5–5.3)
PROT SERPL-MCNC: 6 G/DL — SIGNIFICANT CHANGE UP (ref 6–8.3)
RBC # BLD: 3.6 M/UL — LOW (ref 4.2–5.8)
RBC # FLD: 13.9 % — SIGNIFICANT CHANGE UP (ref 10.3–14.5)
SODIUM SERPL-SCNC: 141 MMOL/L — SIGNIFICANT CHANGE UP (ref 135–145)
SPECIMEN SOURCE: SIGNIFICANT CHANGE UP
SPECIMEN SOURCE: SIGNIFICANT CHANGE UP
WBC # BLD: 3.95 K/UL — SIGNIFICANT CHANGE UP (ref 3.8–10.5)
WBC # FLD AUTO: 3.95 K/UL — SIGNIFICANT CHANGE UP (ref 3.8–10.5)

## 2018-10-22 RX ORDER — INSULIN LISPRO 100/ML
VIAL (ML) SUBCUTANEOUS
Qty: 0 | Refills: 0 | Status: DISCONTINUED | OUTPATIENT
Start: 2018-10-22 | End: 2018-10-23

## 2018-10-22 RX ORDER — URSODIOL 250 MG/1
1 TABLET, FILM COATED ORAL
Qty: 0 | Refills: 0 | COMMUNITY

## 2018-10-22 RX ORDER — LACTULOSE 10 G/15ML
45 SOLUTION ORAL
Qty: 500 | Refills: 0 | OUTPATIENT
Start: 2018-10-22 | End: 2018-11-20

## 2018-10-22 RX ORDER — FUROSEMIDE 40 MG
1 TABLET ORAL
Qty: 0 | Refills: 0 | COMMUNITY

## 2018-10-22 RX ORDER — LACTULOSE 10 G/15ML
15 SOLUTION ORAL
Qty: 0 | Refills: 0 | COMMUNITY
Start: 2018-10-22 | End: 2018-11-20

## 2018-10-22 RX ORDER — URSODIOL 250 MG/1
1 TABLET, FILM COATED ORAL
Qty: 90 | Refills: 0 | OUTPATIENT
Start: 2018-10-22 | End: 2018-11-20

## 2018-10-22 RX ORDER — FUROSEMIDE 40 MG
1 TABLET ORAL
Qty: 30 | Refills: 0 | OUTPATIENT
Start: 2018-10-22 | End: 2018-11-20

## 2018-10-22 RX ORDER — LACTULOSE 10 G/15ML
30 SOLUTION ORAL
Qty: 0 | Refills: 0 | Status: DISCONTINUED | OUTPATIENT
Start: 2018-10-22 | End: 2018-10-23

## 2018-10-22 RX ORDER — OMEPRAZOLE 10 MG/1
1 CAPSULE, DELAYED RELEASE ORAL
Qty: 0 | Refills: 0 | COMMUNITY

## 2018-10-22 RX ORDER — OMEPRAZOLE 10 MG/1
1 CAPSULE, DELAYED RELEASE ORAL
Qty: 60 | Refills: 0 | OUTPATIENT
Start: 2018-10-22 | End: 2018-11-20

## 2018-10-22 RX ORDER — INSULIN LISPRO 100/ML
VIAL (ML) SUBCUTANEOUS AT BEDTIME
Qty: 0 | Refills: 0 | Status: DISCONTINUED | OUTPATIENT
Start: 2018-10-22 | End: 2018-10-23

## 2018-10-22 RX ADMIN — PANTOPRAZOLE SODIUM 40 MILLIGRAM(S): 20 TABLET, DELAYED RELEASE ORAL at 06:39

## 2018-10-22 RX ADMIN — GABAPENTIN 800 MILLIGRAM(S): 400 CAPSULE ORAL at 06:39

## 2018-10-22 RX ADMIN — PIPERACILLIN AND TAZOBACTAM 25 GRAM(S): 4; .5 INJECTION, POWDER, LYOPHILIZED, FOR SOLUTION INTRAVENOUS at 13:25

## 2018-10-22 RX ADMIN — PANTOPRAZOLE SODIUM 40 MILLIGRAM(S): 20 TABLET, DELAYED RELEASE ORAL at 17:39

## 2018-10-22 RX ADMIN — Medication 1 GRAM(S): at 11:34

## 2018-10-22 RX ADMIN — PIPERACILLIN AND TAZOBACTAM 25 GRAM(S): 4; .5 INJECTION, POWDER, LYOPHILIZED, FOR SOLUTION INTRAVENOUS at 06:38

## 2018-10-22 RX ADMIN — Medication 10 UNIT(S): at 11:50

## 2018-10-22 RX ADMIN — GABAPENTIN 800 MILLIGRAM(S): 400 CAPSULE ORAL at 11:34

## 2018-10-22 RX ADMIN — Medication 1 GRAM(S): at 06:39

## 2018-10-22 RX ADMIN — URSODIOL 300 MILLIGRAM(S): 250 TABLET, FILM COATED ORAL at 06:39

## 2018-10-22 RX ADMIN — LACTULOSE 30 GRAM(S): 10 SOLUTION ORAL at 17:42

## 2018-10-22 RX ADMIN — GABAPENTIN 800 MILLIGRAM(S): 400 CAPSULE ORAL at 17:39

## 2018-10-22 RX ADMIN — ENOXAPARIN SODIUM 40 MILLIGRAM(S): 100 INJECTION SUBCUTANEOUS at 11:34

## 2018-10-22 RX ADMIN — URSODIOL 300 MILLIGRAM(S): 250 TABLET, FILM COATED ORAL at 13:25

## 2018-10-22 RX ADMIN — GABAPENTIN 800 MILLIGRAM(S): 400 CAPSULE ORAL at 00:18

## 2018-10-22 RX ADMIN — Medication 10 UNIT(S): at 17:38

## 2018-10-22 RX ADMIN — Medication 1 GRAM(S): at 17:38

## 2018-10-22 RX ADMIN — PIPERACILLIN AND TAZOBACTAM 25 GRAM(S): 4; .5 INJECTION, POWDER, LYOPHILIZED, FOR SOLUTION INTRAVENOUS at 22:54

## 2018-10-22 RX ADMIN — URSODIOL 300 MILLIGRAM(S): 250 TABLET, FILM COATED ORAL at 22:54

## 2018-10-22 RX ADMIN — INSULIN GLARGINE 50 UNIT(S): 100 INJECTION, SOLUTION SUBCUTANEOUS at 22:55

## 2018-10-22 RX ADMIN — LACTULOSE 30 GRAM(S): 10 SOLUTION ORAL at 06:38

## 2018-10-22 RX ADMIN — Medication 2: at 17:37

## 2018-10-22 RX ADMIN — Medication 1 GRAM(S): at 00:18

## 2018-10-22 RX ADMIN — LOSARTAN POTASSIUM 100 MILLIGRAM(S): 100 TABLET, FILM COATED ORAL at 06:39

## 2018-10-22 RX ADMIN — Medication 10 UNIT(S): at 07:48

## 2018-10-22 RX ADMIN — Medication 2: at 11:49

## 2018-10-22 NOTE — ADVANCED PRACTICE NURSE CONSULT - RECOMMEDATIONS
T2 with a1c 10.2%  Provided diabetes education-see flowsheets  Appt confirmed with Dr. Hutchinson for Nov 5th 9:30am  new Glucometer ordered.  To DC metformin/actos at home  Goal 100-180 mg/dL

## 2018-10-22 NOTE — ADVANCED PRACTICE NURSE CONSULT - ASSESSMENT
Vital Signs Last 24 Hrs  T(C): 36.4 (22 Oct 2018 12:55), Max: 36.6 (22 Oct 2018 04:45)  T(F): 97.5 (22 Oct 2018 12:55), Max: 97.8 (22 Oct 2018 04:45)  HR: 86 (22 Oct 2018 12:55) (81 - 86)  BP: 132/78 (22 Oct 2018 12:55) (121/70 - 132/78)  BP(mean): --  RR: 17 (22 Oct 2018 12:55) (17 - 18)  SpO2: 99% (22 Oct 2018 12:55) (96% - 99%)    Hemoglobin A1C, Whole Blood: 10.2 % (10-18-18 @ 10:50)  Hemoglobin A1C, Whole Blood: 10.4 % (07-11-18 @ 07:47)  Hemoglobin A1C, Whole Blood: 8.6 % (05-04-18 @ 15:08)   eGFR if : 108 mL/min/1.73M2 (10-22-18 @ 08:47)  eGFR if Non : 93 mL/min/1.73M2 (10-22-18 @ 08:47)  eGFR if Non African American: 87 mL/min/1.73M2 (10-20-18 @ 09:15)  eGFR if African American: 100 mL/min/1.73M2 (10-20-18 @ 09:15)      CAPILLARY BLOOD GLUCOSE      POCT Blood Glucose.: 177 mg/dL (22 Oct 2018 17:31)  POCT Blood Glucose.: 164 mg/dL (22 Oct 2018 11:40)  POCT Blood Glucose.: 144 mg/dL (22 Oct 2018 08:18)  POCT Blood Glucose.: 182 mg/dL (21 Oct 2018 22:23)      DIET:

## 2018-10-22 NOTE — PROGRESS NOTE ADULT - PROBLEM SELECTOR PLAN 10
PADUA SCORE: 4, pharmacologic ppx indicated  Lovenox 40mg SubQ daily for dvt ppx    11. Per phone discussion with Pt's wife, Pt was started on Lasix in June/July for noted LE edema. His PMD (Dr. Coto) was hesitant to put him on Lasix. He has lost 65 lbs since starting Lasix.

## 2018-10-22 NOTE — PROGRESS NOTE ADULT - SUBJECTIVE AND OBJECTIVE BOX
Patient is a 62y old  Male who presents with a chief complaint of abdominal pain (22 Oct 2018 09:48)      INTERVAL HPI:  Pt is a 63 y/o M with PMHx of Type 2 DM, HTN, HLD, obesity, nephrolithiasis (last stone 25 years ago), neuropathy, HE, NAFLD, who presents with abdominal pain and vomiting for the past two days. Pt states he has had a decreased appetite since Sunday evening along with non radiating intermittent epigastric abd pain rated 7/10. Yesterday he felt nauseous and vomited greenish fluid once. Today, he vomited 3 more times in the morning and went to Dr. Morales's office, who recommended pt get bloodwork and have a CT Abd/Pelvis. Pt states after leaving office, he started feeling nauseous and vomited once more before coming into ED. Admits epigastric abd pain, nausea, vomiting. Denies HA, dizziness, CP, SOB, diarrhea, or bloody stools. Denies recent travel or sick contacts.    In the ED, VS: T 98.4, HR 98, /77, RR 17, sat 99% RA, WBC 5.94, H/H 12.5/42.2, plat 108, Lactate 4.1, ammonia 73, Cl 112, CO2 21, BUN 13, Cr 0.71, Glu 189, Ca 7.5, prot 5.5, alb 2.1, bili 4.0, alk phos 165,AST 29, ALT 22, amylase 20, lipase 20, CT abd/pelvis - Impression may be associated with duodenitis and/or peptic ulcer disease; recommend further clinical correlation. 4 mm non obstructing calcification at the lower pole the left kidney. CXR showing no acute pathology. EKG showing mild prolonged Qtc at 480.    10/19/18: Overnight ++blood cx gram variable rods, blood cx sent, started on zosyn. ID Dr. REYNOSO called. EGD done, showing non bleeding esophageal ulcers, esophagitis, non bleeding duodenal ulcers, no EV/GV. Pt seen, examined. Denying abdominal pain, nausea/vomiting, diarrhea. No fevers, chills, night sweats. NO other complaints, per pt he could not have MRI abd/ MRCP due to his body habitus. Pt is on IV Zosyn . Follow Repeat blood culture.  10/20/18: Pt seen, Examined, No Complaints, wants to go home, Awaiting ID for gram Variable Rods, On IV Zosyn.   10/21/18: Pt seen, examined, Feels well, On IV Zosyn for Bacteremia with Gram variable rods In Blood cultures. No Complaints  10/22/18: Pt seen, examined, Feels well, 2 BMs this am. On IV Zosyn for Bacteremia with Gram variable rods In Blood cultures, no growth in repeat BCx awaiting ID recs re: antibiotics. No Complaints      OVERNIGHT EVENTS: NONE    Home Medications:  furosemide 20 mg oral tablet: 1 tab(s) orally once a day (17 Oct 2018 20:22)  HumaLOG 100 units/mL subcutaneous solution: 20 unit(s) subcutaneous 2 times a day(breakfast and lunch) (17 Oct 2018 20:22)  HumaLOG 100 units/mL subcutaneous solution: 30 unit(s) subcutaneous once a day(@dinner) (17 Oct 2018 20:22)  irbesartan 300 mg oral tablet: 1 tab(s) orally once a day (17 Oct 2018 20:22)  omeprazole 20 mg oral delayed release capsule: 1 cap(s) orally 2 times a day (20 Oct 2018 14:37)  phytonadione 100 mcg oral tablet: 1 tab(s) orally once a day (17 Oct 2018 20:22)  potassium chloride 10 mEq oral tablet, extended release: 1 tab(s) orally once a day (17 Oct 2018 20:22)  pravastatin 40 mg oral tablet: 1 tab(s) orally once a day (17 Oct 2018 20:22)  ursodiol 300 mg oral capsule: 1 cap(s) orally 3 times a day (17 Oct 2018 20:22)  Vitamin D3 2000 intl units oral tablet: 1 tab(s) orally once a day (17 Oct 2018 20:22)      MEDICATIONS  (STANDING):  dextrose 5%. 1000 milliLiter(s) (50 mL/Hr) IV Continuous <Continuous>  dextrose 50% Injectable 12.5 Gram(s) IV Push once  dextrose 50% Injectable 25 Gram(s) IV Push once  dextrose 50% Injectable 25 Gram(s) IV Push once  enoxaparin Injectable 40 milliGRAM(s) SubCutaneous daily  gabapentin 800 milliGRAM(s) Oral four times a day  insulin glargine Injectable (LANTUS) 50 Unit(s) SubCutaneous at bedtime  insulin lispro (HumaLOG) corrective regimen sliding scale   SubCutaneous three times a day before meals  insulin lispro (HumaLOG) corrective regimen sliding scale   SubCutaneous at bedtime  insulin lispro Injectable (HumaLOG) 10 Unit(s) SubCutaneous three times a day before meals  lactulose Syrup 30 Gram(s) Oral two times a day  losartan 100 milliGRAM(s) Oral daily  pantoprazole    Tablet 40 milliGRAM(s) Oral two times a day  piperacillin/tazobactam IVPB. 3.375 Gram(s) IV Intermittent every 8 hours  rifaximin 550 milliGRAM(s) Oral two times a day  sucralfate 1 Gram(s) Oral every 6 hours  ursodiol Capsule 300 milliGRAM(s) Oral three times a day    MEDICATIONS  (PRN):  dextrose 40% Gel 15 Gram(s) Oral once PRN Blood Glucose LESS THAN 70 milliGRAM(s)/deciliter  glucagon  Injectable 1 milliGRAM(s) IntraMuscular once PRN Glucose LESS THAN 70 milligrams/deciliter      Allergies    codeine (Anaphylaxis)    Intolerances        REVIEW OF SYSTEMS:  CONSTITUTIONAL: No fever, No chills, No fatigue, No myalgia, No Body ache, No Weakness  NECK: No pain, No stiffness  RESPIRATORY: No cough, wheezing, No  hemoptysis, No shortness of breath  CARDIOVASCULAR: No chest pain, palpitations  GASTROINTESTINAL: No abdominal or epigastric pain. No nausea, No vomiting; No diarrhea or constipation. [ x ] BM  GENITOURINARY: No dysuria, No frequency, No urgency, No hematuria  NEUROLOGICAL: No headaches, No dizziness  EXT: No Swelling, No Pain, No Edema  SKIN:  [ x ] No itching, burning, rashes, or lesions   MUSCULOSKELETAL: No joint pain or swelling; No muscle pain, No back pain, No extremity pain  PSYCHIATRIC: No depression, anxiety, mood swings or difficulty sleeping at night  PAIN SCALE: [ x ] None  [  ] Other-  ROS Unable to obtain due to - [  ] Dementia  [  ] Lethargy  [  ] Sedated [  ] non verbal  REST OF REVIEW Of SYSTEM - [ x ] Normal     Vital Signs Last 24 Hrs  T(C): 36.6 (22 Oct 2018 04:45), Max: 36.6 (21 Oct 2018 13:19)  T(F): 97.8 (22 Oct 2018 04:45), Max: 97.8 (21 Oct 2018 13:19)  HR: 86 (22 Oct 2018 04:45) (81 - 87)  BP: 121/70 (22 Oct 2018 04:45) (118/70 - 130/67)  BP(mean): --  RR: 18 (22 Oct 2018 04:45) (17 - 19)  SpO2: 96% (22 Oct 2018 04:45) (96% - 99%)  Finger Stick          PHYSICAL EXAM:  GENERAL:  [ x ] NAD , [ x ] well appearing, [  ] Agitated, [  ] Drowsy,  [  ] Lethargy, [  ] confused   HEAD:  [ x ] Normal, [  ] Other  EYES:  [ x ] EOMI, [ x ] PERRLA, [ x ] conjunctiva and sclera clear normal, [  ] Other,  [  ] Pallor,[  ] Discharge  ENMT:  [ x ] Normal, [ x ] Moist mucous membranes, [  ] Good dentition, [  ] No Thrush  NECK:  [ x ] Supple, [ x ] No JVD, [ x ] Normal thyroid, [  ] Lymphadenopathy [  ] Other  CHEST/LUNG:  [ x ] Clear to auscultation bilaterally, [ x ] Breath Sounds equal OR Decrease,  [ x ] No rales, [ x ] No rhonchi  [ x ]  No wheezing  HEART:  [ x ] Regular rate and rhythm, [  ] tachycardia, [  ] Bradycardia,  [  ] irregular  [ x ] No murmurs, No rubs, No gallops, [  ] PPM in place (Mfr:  )  ABDOMEN:  [ x ] Soft, [ x ] Nontender, [ x ] Nondistended, [  ] No mass, [ x ] Bowel sounds present, [ x ] obese  NERVOUS SYSTEM:  [ x ] Alert & Oriented X3, [ x ] Nonfocal  [  ] Confusion  [  ] Encephalopathic [  ] Sedated [  ] Unable to assess, [  ] Other-  EXTREMITIES: [ x ] 2+ Peripheral Pulses, No clubbing, No cyanosis,  [  ] edema B/L lower EXT. [  ] PVD stasis skin changes B/L Lower EXT  LYMPH: No lymphadenopathy noted  SKIN:  [ x ] No rashes or lesions, [  ] Pressure Ulcers, [  ] ecchymosis, [  ] Skin Tears, [  ] Other    DIET: DASH diet    LABS:                        12.0   3.95  )-----------( 83       ( 22 Oct 2018 08:47 )             35.4     22 Oct 2018 08:47    141    |  106    |  7      ----------------------------<  200    3.9     |  29     |  0.85     Ca    8.4        22 Oct 2018 08:47    TPro  6.0    /  Alb  2.4    /  TBili  2.6    /  DBili  x      /  AST  38     /  ALT  28     /  AlkPhos  185    22 Oct 2018 08:47          Culture Results:   No growth to date. (10-19 @ 10:42)  Culture Results:   No growth to date. (10-19 @ 10:42)  Culture Results:   No growth to date. (10-17 @ 18:43)  Culture Results:   Growth in anaerobic bottle: Gram Variable Rods (10-17 @ 18:43)                  Culture - Blood (collected 19 Oct 2018 10:42)  Source: .Blood Blood  Preliminary Report (20 Oct 2018 11:00):    No growth to date.    Culture - Blood (collected 19 Oct 2018 10:42)  Source: .Blood Blood  Preliminary Report (20 Oct 2018 11:00):    No growth to date.    Culture - Blood (collected 17 Oct 2018 18:43)  Source: .Blood Blood  Gram Stain (18 Oct 2018 23:09):    Growth in anaerobic bottle: Gram Variable Rods  Preliminary Report (18 Oct 2018 23:10):    Growth in anaerobic bottle: Gram Variable Rods    Culture - Blood (collected 17 Oct 2018 18:43)  Source: .Blood Blood  Preliminary Report (18 Oct 2018 19:01):    No growth to date.         Anemia Panel:      Thyroid Panel:        Amylase, Serum Total: 20 U/L (10-17-18 @ 15:11)  Lipase, Serum: 54 U/L (10-17-18 @ 15:11)          RADIOLOGY & ADDITIONAL TESTS:  New imaging reviewed    HEALTH ISSUES - PROBLEM Dx:  Bacteremia: Bacteremia  Positive blood culture: Positive blood culture  Diabetes mellitus type 2, uncontrolled: Diabetes mellitus type 2, uncontrolled  Need for prophylactic measure: Need for prophylactic measure  Hypercholesteremia: Hypercholesteremia  Hepatic encephalopathy: Hepatic encephalopathy  Fatty liver disease, nonalcoholic: Fatty liver disease, nonalcoholic  Neuropathy: Neuropathy  Type 2 diabetes mellitus: Type 2 diabetes mellitus  Abdominal pain: Abdominal pain  Obesity: Obesity  Diabetes: Diabetes  Hypertension: Hypertension  Stone in kidney: Stone in kidney          Consultant(s) Notes Reviewed:  [ x ] YES     Care Discussed with [X] Consultants  [  ] Patient  [  ] Family  [  ]   [  ] Social Service  [  ] RN, [  ] Physical Therapy  DVT PPX: [ x ] Lovenox, [  ] S C Heparin, [  ] Coumadin, [  ] Xarelto, [  ] Eliquis, [  ] Pradaxa, [  ] IV Heparin drip, [  ] SCD [  ] Contraindication 2 to GI Bleed,[  ] Ambulation  Advanced directive: [  ] None, [  ] DNR/DNI Patient is a 62y old  Male who presents with a chief complaint of abdominal pain (22 Oct 2018 09:48)      INTERVAL HPI:  Pt is a 61 y/o M with PMHx of Type 2 DM, HTN, HLD, obesity, nephrolithiasis (last stone 25 years ago), neuropathy, HE, NAFLD, who presents with abdominal pain and vomiting for the past two days. Pt states he has had a decreased appetite since Sunday evening along with non radiating intermittent epigastric abd pain rated 7/10. Yesterday he felt nauseous and vomited greenish fluid once. Today, he vomited 3 more times in the morning and went to Dr. Morales's office, who recommended pt get bloodwork and have a CT Abd/Pelvis. Pt states after leaving office, he started feeling nauseous and vomited once more before coming into ED. Admits epigastric abd pain, nausea, vomiting. Denies HA, dizziness, CP, SOB, diarrhea, or bloody stools. Denies recent travel or sick contacts.    In the ED, VS: T 98.4, HR 98, /77, RR 17, sat 99% RA, WBC 5.94, H/H 12.5/42.2, plat 108, Lactate 4.1, ammonia 73, Cl 112, CO2 21, BUN 13, Cr 0.71, Glu 189, Ca 7.5, prot 5.5, alb 2.1, bili 4.0, alk phos 165,AST 29, ALT 22, amylase 20, lipase 20, CT abd/pelvis - Impression may be associated with duodenitis and/or peptic ulcer disease; recommend further clinical correlation. 4 mm non obstructing calcification at the lower pole the left kidney. CXR showing no acute pathology. EKG showing mild prolonged Qtc at 480.    10/19/18: Overnight ++blood cx gram variable rods, blood cx sent, started on zosyn. ID Dr. REYNOSO called. EGD done, showing non bleeding esophageal ulcers, esophagitis, non bleeding duodenal ulcers, no EV/GV. Pt seen, examined. Denying abdominal pain, nausea/vomiting, diarrhea. No fevers, chills, night sweats. NO other complaints, per pt he could not have MRI abd/ MRCP due to his body habitus. Pt is on IV Zosyn . Follow Repeat blood culture.  10/20/18: Pt seen, Examined, No Complaints, wants to go home, Awaiting ID for gram Variable Rods, On IV Zosyn.   10/21/18: Pt seen, examined, Feels well, On IV Zosyn for Bacteremia with Gram variable rods In Blood cultures. No Complaints  10/22/18: Pt seen, examined, Feels well, 2 BMs this am. On IV Zosyn for Bacteremia with Gram variable rods In Blood cultures, no growth in repeat BCx awaiting ID recs re: antibiotics. No Complaints, PAt weil saw pt.      OVERNIGHT EVENTS: NONE    Home Medications:  furosemide 20 mg oral tablet: 1 tab(s) orally once a day (17 Oct 2018 20:22)  HumaLOG 100 units/mL subcutaneous solution: 20 unit(s) subcutaneous 2 times a day(breakfast and lunch) (17 Oct 2018 20:22)  HumaLOG 100 units/mL subcutaneous solution: 30 unit(s) subcutaneous once a day(@dinner) (17 Oct 2018 20:22)  irbesartan 300 mg oral tablet: 1 tab(s) orally once a day (17 Oct 2018 20:22)  omeprazole 20 mg oral delayed release capsule: 1 cap(s) orally 2 times a day (20 Oct 2018 14:37)  phytonadione 100 mcg oral tablet: 1 tab(s) orally once a day (17 Oct 2018 20:22)  potassium chloride 10 mEq oral tablet, extended release: 1 tab(s) orally once a day (17 Oct 2018 20:22)  pravastatin 40 mg oral tablet: 1 tab(s) orally once a day (17 Oct 2018 20:22)  ursodiol 300 mg oral capsule: 1 cap(s) orally 3 times a day (17 Oct 2018 20:22)  Vitamin D3 2000 intl units oral tablet: 1 tab(s) orally once a day (17 Oct 2018 20:22)      MEDICATIONS  (STANDING):  dextrose 5%. 1000 milliLiter(s) (50 mL/Hr) IV Continuous <Continuous>  dextrose 50% Injectable 12.5 Gram(s) IV Push once  dextrose 50% Injectable 25 Gram(s) IV Push once  dextrose 50% Injectable 25 Gram(s) IV Push once  enoxaparin Injectable 40 milliGRAM(s) SubCutaneous daily  gabapentin 800 milliGRAM(s) Oral four times a day  insulin glargine Injectable (LANTUS) 50 Unit(s) SubCutaneous at bedtime  insulin lispro (HumaLOG) corrective regimen sliding scale   SubCutaneous three times a day before meals  insulin lispro (HumaLOG) corrective regimen sliding scale   SubCutaneous at bedtime  insulin lispro Injectable (HumaLOG) 10 Unit(s) SubCutaneous three times a day before meals  lactulose Syrup 30 Gram(s) Oral two times a day  losartan 100 milliGRAM(s) Oral daily  pantoprazole    Tablet 40 milliGRAM(s) Oral two times a day  piperacillin/tazobactam IVPB. 3.375 Gram(s) IV Intermittent every 8 hours  rifaximin 550 milliGRAM(s) Oral two times a day  sucralfate 1 Gram(s) Oral every 6 hours  ursodiol Capsule 300 milliGRAM(s) Oral three times a day    MEDICATIONS  (PRN):  dextrose 40% Gel 15 Gram(s) Oral once PRN Blood Glucose LESS THAN 70 milliGRAM(s)/deciliter  glucagon  Injectable 1 milliGRAM(s) IntraMuscular once PRN Glucose LESS THAN 70 milligrams/deciliter      Allergies    codeine (Anaphylaxis)    Intolerances        REVIEW OF SYSTEMS: feels fine  CONSTITUTIONAL: No fever, No chills, No fatigue, No myalgia, No Body ache, No Weakness  NECK: No pain, No stiffness  RESPIRATORY: No cough, wheezing, No  hemoptysis, No shortness of breath  CARDIOVASCULAR: No chest pain, palpitations  GASTROINTESTINAL: No abdominal or epigastric pain. No nausea, No vomiting; No diarrhea or constipation. [ x ] BM  GENITOURINARY: No dysuria, No frequency, No urgency, No hematuria  NEUROLOGICAL: No headaches, No dizziness  EXT: No Swelling, No Pain, No Edema  SKIN:  [ x ] No itching, burning, rashes, or lesions   MUSCULOSKELETAL: No joint pain or swelling; No muscle pain, No back pain, No extremity pain  PSYCHIATRIC: No depression, anxiety, mood swings or difficulty sleeping at night  PAIN SCALE: [ x ] None  [  ] Other-  ROS Unable to obtain due to - [  ] Dementia  [  ] Lethargy  [  ] Sedated [  ] non verbal  REST OF REVIEW Of SYSTEM - [ x ] Normal     Vital Signs Last 24 Hrs  T(C): 36.6 (22 Oct 2018 04:45), Max: 36.6 (21 Oct 2018 13:19)  T(F): 97.8 (22 Oct 2018 04:45), Max: 97.8 (21 Oct 2018 13:19)  HR: 86 (22 Oct 2018 04:45) (81 - 87)  BP: 121/70 (22 Oct 2018 04:45) (118/70 - 130/67)  BP(mean): --  RR: 18 (22 Oct 2018 04:45) (17 - 19)  SpO2: 96% (22 Oct 2018 04:45) (96% - 99%)  Finger Stick          PHYSICAL EXAM:  GENERAL:  [ x ] NAD , [ x ] well appearing, [  ] Agitated, [  ] Drowsy,  [  ] Lethargy, [  ] confused   HEAD:  [ x ] Normal, [  ] Other  EYES:  [ x ] EOMI, [ x ] PERRLA, [ x ] conjunctiva and sclera clear normal, [  ] Other,  [  ] Pallor,[  ] Discharge  ENMT:  [ x ] Normal, [ x ] Moist mucous membranes, [  ] Good dentition, [  ] No Thrush  NECK:  [ x ] Supple, [ x ] No JVD, [ x ] Normal thyroid, [  ] Lymphadenopathy [  ] Other  CHEST/LUNG:  [ x ] Clear to auscultation bilaterally, [ x ] Breath Sounds equal B/L ,  [ x ] No rales, [ x ] No rhonchi  [ x ]  No wheezing  HEART:  [ x ] Regular rate and rhythm, [  ] tachycardia, [  ] Bradycardia,  [  ] irregular  [ x ] No murmurs, No rubs, No gallops, [  ] PPM in place (Mfr:  )  ABDOMEN:  [ x ] Soft, [ x ] Nontender, [ x ] Nondistended, [  ] No mass, [ x ] Bowel sounds present, [ x ] obese  NERVOUS SYSTEM:  [ x ] Alert & Oriented X3, [ x ] Nonfocal  [  ] Confusion  [  ] Encephalopathic [  ] Sedated [  ] Unable to assess, [  ] Other-  EXTREMITIES: [ x ] 2+ Peripheral Pulses, No clubbing, No cyanosis,  [  ] edema B/L lower EXT. [  ] PVD stasis skin changes B/L Lower EXT  LYMPH: No lymphadenopathy noted  SKIN:  [ x ] No rashes or lesions, [  ] Pressure Ulcers, [  ] ecchymosis, [  ] Skin Tears, [  ] Other    DIET: DASH diet    LABS:                        12.0   3.95  )-----------( 83       ( 22 Oct 2018 08:47 )             35.4     22 Oct 2018 08:47    141    |  106    |  7      ----------------------------<  200    3.9     |  29     |  0.85     Ca    8.4        22 Oct 2018 08:47    TPro  6.0    /  Alb  2.4    /  TBili  2.6    /  DBili  x      /  AST  38     /  ALT  28     /  AlkPhos  185    22 Oct 2018 08:47    Culture Results:   No growth to date. (10-19 @ 10:42)  Culture Results:   No growth to date. (10-19 @ 10:42)  Culture Results:   No growth to date. (10-17 @ 18:43)  Culture Results:   Growth in anaerobic bottle: Gram Variable Rods (10-17 @ 18:43)    Culture - Blood (collected 19 Oct 2018 10:42)  Source: .Blood Blood  Preliminary Report (20 Oct 2018 11:00):    No growth to date.    Culture - Blood (collected 19 Oct 2018 10:42)  Source: .Blood Blood  Preliminary Report (20 Oct 2018 11:00):    No growth to date.    Culture - Blood (collected 17 Oct 2018 18:43)  Source: .Blood Blood  Gram Stain (18 Oct 2018 23:09):    Growth in anaerobic bottle: Gram Variable Rods  Preliminary Report (18 Oct 2018 23:10):    Growth in anaerobic bottle: Gram Variable Rods    Culture - Blood (collected 17 Oct 2018 18:43)  Source: .Blood Blood  Preliminary Report (18 Oct 2018 19:01):    No growth to date.       Amylase, Serum Total: 20 U/L (10-17-18 @ 15:11)  Lipase, Serum: 54 U/L (10-17-18 @ 15:11)      RADIOLOGY & ADDITIONAL TESTS:  New imaging reviewed    HEALTH ISSUES - PROBLEM Dx:  Bacteremia: Bacteremia  Positive blood culture: Positive blood culture  Diabetes mellitus type 2, uncontrolled: Diabetes mellitus type 2, uncontrolled  Need for prophylactic measure: Need for prophylactic measure  Hypercholesteremia: Hypercholesteremia  Hepatic encephalopathy: Hepatic encephalopathy  Fatty liver disease, nonalcoholic: Fatty liver disease, nonalcoholic  Neuropathy: Neuropathy  Type 2 diabetes mellitus: Type 2 diabetes mellitus  Abdominal pain: Abdominal pain  Obesity: Obesity  Diabetes: Diabetes  Hypertension: Hypertension  Stone in kidney: Stone in kidney          Consultant(s) Notes Reviewed:  [ x ] YES     Care Discussed with [X] Consultants  [  ] Patient  [  x] Family  [  ]   [  ] Social Service  [ x ] RN, [  ] Physical Therapy  DVT PPX: [ x ] Lovenox, [  ] S C Heparin, [  ] Coumadin, [  ] Xarelto, [  ] Eliquis, [  ] Pradaxa, [  ] IV Heparin drip, [  ] SCD [  ] Contraindication 2 to GI Bleed,[  ] Ambulation  Advanced directive: [ x ] None, [  ] DNR/DNI

## 2018-10-22 NOTE — PROGRESS NOTE ADULT - SUBJECTIVE AND OBJECTIVE BOX
Patient is a 62y Male whom presented to the hospital with kidney stone   seen in am nad   PAST MEDICAL & SURGICAL HISTORY:  Obesity  Fatty liver disease, nonalcoholic  Renal stones: 25 years ago  Hypertension  Neuropathy  Hypercholesteremia  Diabetes  S/P cholecystectomy      MEDICATIONS  (STANDING):  lactulose Syrup 30 Gram(s) Oral daily  pantoprazole  Injectable 40 milliGRAM(s) IV Push every 12 hours  rifaximin 550 milliGRAM(s) Oral two times a day  sodium chloride 0.9%. 1000 milliLiter(s) (75 mL/Hr) IV Continuous <Continuous>  sucralfate suspension 1 Gram(s) Oral two times a day          REVIEW OF SYSTEMS:    CONSTITUTIONAL: No weakness, fevers or chills  RESPIRATORY: No cough, wheezing, hemoptysis; no shortness of breath  CARDIOVASCULAR: No chest pain or palpitations  GASTROINTESTINAL: No abdominal or epigastric pain. pos  nausea, vomiting,     No diarrhea or constipation. No melena   SKIN: dry                                                   12.0   3.95  )-----------( 83       ( 22 Oct 2018 08:47 )             35.4       CBC Full  -  ( 22 Oct 2018 08:47 )  WBC Count : 3.95 K/uL  Hemoglobin : 12.0 g/dL  Hematocrit : 35.4 %  Platelet Count - Automated : 83 K/uL  Mean Cell Volume : 98.3 fl  Mean Cell Hemoglobin : 33.3 pg  Mean Cell Hemoglobin Concentration : 33.9 gm/dL  Auto Neutrophil # : x  Auto Lymphocyte # : x  Auto Monocyte # : x  Auto Eosinophil # : x  Auto Basophil # : x  Auto Neutrophil % : x  Auto Lymphocyte % : x  Auto Monocyte % : x  Auto Eosinophil % : x  Auto Basophil % : x      10-22    141  |  106  |  7   ----------------------------<  200<H>  3.9   |  29  |  0.85    Ca    8.4<L>      22 Oct 2018 08:47    TPro  6.0  /  Alb  2.4<L>  /  TBili  2.6<H>  /  DBili  x   /  AST  38<H>  /  ALT  28  /  AlkPhos  185<H>  10-22      CAPILLARY BLOOD GLUCOSE      POCT Blood Glucose.: 164 mg/dL (22 Oct 2018 11:40)  POCT Blood Glucose.: 144 mg/dL (22 Oct 2018 08:18)  POCT Blood Glucose.: 182 mg/dL (21 Oct 2018 22:23)  POCT Blood Glucose.: 191 mg/dL (21 Oct 2018 17:09)      Vital Signs Last 24 Hrs  T(C): 36.4 (22 Oct 2018 12:55), Max: 36.6 (22 Oct 2018 04:45)  T(F): 97.5 (22 Oct 2018 12:55), Max: 97.8 (22 Oct 2018 04:45)  HR: 86 (22 Oct 2018 12:55) (81 - 86)  BP: 132/78 (22 Oct 2018 12:55) (121/70 - 132/78)  BP(mean): --  RR: 17 (22 Oct 2018 12:55) (17 - 18)  SpO2: 99% (22 Oct 2018 12:55) (96% - 99%)                     PHYSICAL EXAM:    Constitutional: NAD  HEENT: conjunctive   clear   Neck:  No JVD  Respiratory: CTAB  Cardiovascular: S1 and S2  Gastrointestinal: BS+, soft, NT/ND  Extremities: No peripheral edema  Neurological: A/O x 3, no focal deficits  Psychiatric: Normal mood, normal affect  : No Beasley  Skin: No rashes  Access: Not applicable

## 2018-10-22 NOTE — PROGRESS NOTE ADULT - PROBLEM SELECTOR PLAN 1
-Blood cx 10/17 growing gram variable rods. F/u 10/19 blood cultures. AFEBRILE NO LEUKOCYTOSIS.  - on  IV zosyn q 8 hrs  -ID dr. Rios/ Dr megan stephenson -to follow culture Iden.  -Repeat Blood culture- NEG so far -Blood cx 10/17 growing gram variable rods. F/u 10/19 blood cultures. AFEBRILE NO LEUKOCYTOSIS.  - on  IV zosyn q 8 hrs  -ID dr. Rios/ Dr megan stephenson -to follow culture Iden.  -Repeat Blood culture- NEG so far. D/W DR Wells at detail, D/C plan in AM as per C/s iden

## 2018-10-22 NOTE — PROGRESS NOTE ADULT - SUBJECTIVE AND OBJECTIVE BOX
CAPILLARY BLOOD GLUCOSE      POCT Blood Glucose.: 182 mg/dL (21 Oct 2018 22:23)  POCT Blood Glucose.: 191 mg/dL (21 Oct 2018 17:09)  POCT Blood Glucose.: 153 mg/dL (21 Oct 2018 12:24)  POCT Blood Glucose.: 125 mg/dL (21 Oct 2018 08:02)      Vital Signs Last 24 Hrs  T(C): 36.6 (22 Oct 2018 04:45), Max: 36.6 (21 Oct 2018 13:19)  T(F): 97.8 (22 Oct 2018 04:45), Max: 97.8 (21 Oct 2018 13:19)  HR: 86 (22 Oct 2018 04:45) (81 - 87)  BP: 121/70 (22 Oct 2018 04:45) (118/70 - 130/67)  BP(mean): --  RR: 18 (22 Oct 2018 04:45) (17 - 19)  SpO2: 96% (22 Oct 2018 04:45) (96% - 99%)    General: WN/WD NAD  Respiratory: CTA B/L  CV: RRR, S1S2, no murmurs, rubs or gallops  Abdominal: Soft, NT, ND +BS, Last BM  Extremities: No edema, + peripheral pulses     10-20    140  |  106  |  9   ----------------------------<  166<H>  3.9   |  26  |  0.94    Ca    8.1<L>      20 Oct 2018 09:15    TPro  6.4  /  Alb  2.5<L>  /  TBili  3.0<H>  /  DBili  1.30<H>  /  AST  40<H>  /  ALT  30  /  AlkPhos  191<H>  10-20      dextrose 40% Gel 15 Gram(s) Oral once PRN  dextrose 50% Injectable 12.5 Gram(s) IV Push once  dextrose 50% Injectable 25 Gram(s) IV Push once  dextrose 50% Injectable 25 Gram(s) IV Push once  glucagon  Injectable 1 milliGRAM(s) IntraMuscular once PRN  insulin glargine Injectable (LANTUS) 50 Unit(s) SubCutaneous at bedtime  insulin lispro (HumaLOG) corrective regimen sliding scale   SubCutaneous three times a day before meals  insulin lispro Injectable (HumaLOG) 10 Unit(s) SubCutaneous three times a day before meals

## 2018-10-22 NOTE — PROGRESS NOTE ADULT - PROBLEM SELECTOR PLAN 1
cont lantus 50 units qhs  cont humalog 10 units tid before meals  change mod dose humalog scale coverage  cont cons cho diet  goal bg 100-180 in hosp setting

## 2018-10-22 NOTE — PROGRESS NOTE ADULT - ASSESSMENT
Pt is a 63 y/o M with PMHx of Type 2 DM, HTN, HLD, obesity, nephrolithiasis (last stone 25 years ago), neuropathy, HE, NAFLD, who presents with abdominal pain and vomiting for the past two days, admitted for PUD with EGD done, Duodenal ulcers, No Bleeding , Blood cultures + Gram Variable rods,On IV Zosyn

## 2018-10-22 NOTE — PROGRESS NOTE ADULT - SUBJECTIVE AND OBJECTIVE BOX
JESSICA MARTIN is a yMale , patient examined and chart reviewed.     INTERVAL HPI/ OVERNIGHT EVENTS:   Doing well. Afebrile.  No events.    PAST MEDICAL & SURGICAL HISTORY:  Hepatic encephalopathy  Obesity  Fatty liver disease, nonalcoholic  Renal stones: 25 years ago  Hypertension  Neuropathy  Hypercholesteremia  Diabetes  S/P cholecystectomy      For details regarding the patient's social history, family history, and other miscellaneous elements, please refer the initial infectious diseases consultation and/or the admitting history and physical examination for this admission.    ROS:  CONSTITUTIONAL:  Negative fever or chills, feels well, good appetite  EYES:  Negative  blurry vision or double vision  CARDIOVASCULAR:  Negative for chest pain or palpitations  RESPIRATORY:  Negative for cough, wheezing, or SOB   GASTROINTESTINAL:  Negative for nausea, vomiting, diarrhea, constipation, or abdominal pain  GENITOURINARY:  Negative frequency, urgency or dysuria  NEUROLOGIC:  No headache, confusion, dizziness, lightheadedness  All other systems were reviewed and are negative     ALLERGIES:  codeine (Anaphylaxis)      Current inpatient medications :    ANTIBIOTICS/RELEVANT:  piperacillin/tazobactam IVPB. 3.375 Gram(s) IV Intermittent every 8 hours  rifaximin 550 milliGRAM(s) Oral two times a day      dextrose 40% Gel 15 Gram(s) Oral once PRN  dextrose 5%. 1000 milliLiter(s) IV Continuous <Continuous>  dextrose 50% Injectable 12.5 Gram(s) IV Push once  dextrose 50% Injectable 25 Gram(s) IV Push once  dextrose 50% Injectable 25 Gram(s) IV Push once  enoxaparin Injectable 40 milliGRAM(s) SubCutaneous daily  gabapentin 800 milliGRAM(s) Oral four times a day  glucagon  Injectable 1 milliGRAM(s) IntraMuscular once PRN  insulin glargine Injectable (LANTUS) 50 Unit(s) SubCutaneous at bedtime  insulin lispro (HumaLOG) corrective regimen sliding scale   SubCutaneous three times a day before meals  insulin lispro (HumaLOG) corrective regimen sliding scale   SubCutaneous at bedtime  insulin lispro Injectable (HumaLOG) 10 Unit(s) SubCutaneous three times a day before meals  lactulose Syrup 30 Gram(s) Oral four times a day  losartan 100 milliGRAM(s) Oral daily  pantoprazole    Tablet 40 milliGRAM(s) Oral two times a day  sucralfate 1 Gram(s) Oral every 6 hours  ursodiol Capsule 300 milliGRAM(s) Oral three times a day      Objective:    T(C): 36.4 (10-22-18 @ 12:55), Max: 36.6 (10-22-18 @ 04:45)  HR: 86 (10-22-18 @ 12:55) (81 - 86)  BP: 132/78 (10-22-18 @ 12:55) (121/70 - 132/78)  RR: 17 (10-22-18 @ 12:55) (17 - 18)  SpO2: 99% (10-22-18 @ 12:55) (96% - 99%)  Wt(kg): --      Physical Exam:  General: no acute distress  Eyes: sclera anicteric, pupils equal and reactive to light  ENMT: buccal mucosa moist, pharynx not injected  Neck: supple, trachea midline  Lungs: clear, no wheeze/rhonchi  Cardiovascular: regular rate and rhythm, S1 S2  Abdomen: soft, nontender, no organomegaly present, bowel sounds normal  Neurological: alert and oriented x3, Cranial Nerves II-XII grossly intact  Skin: no increased ecchymosis/petechiae/purpura  Lymph Nodes: no palpable cervical/supraclavicular lymph nodes enlargements  Extremities: no cyanosis/clubbing/edema      LABS:                          12.0   3.95  )-----------( 83       ( 22 Oct 2018 08:47 )             35.4       10-22    141  |  106  |  7   ----------------------------<  200<H>  3.9   |  29  |  0.85    Ca    8.4<L>      22 Oct 2018 08:47    TPro  6.0  /  Alb  2.4<L>  /  TBili  2.6<H>  /  DBili  x   /  AST  38<H>  /  ALT  28  /  AlkPhos  185<H>  10-22    MICROBIOLOGY:  Culture - Blood (10.19.18 @ 10:42)    Specimen Source: .Blood Blood    Culture Results:   No growth to date.    Culture - Blood (10.17.18 @ 18:43)    Gram Stain:   Growth in anaerobic bottle: Gram Variable Rods    Specimen Source: .Blood Blood    Culture Results:   Growth in anaerobic bottle: Gram Variable Rods    RADIOLOGY & ADDITIONAL STUDIES:          Assessment :    Pt is a 61 YO M with PMHx obesity, DM 2, HTN, HLD, nephrolithiasis, neuropathy, poss JEAN  admitted with CC of 2 day hx of abdominal pain and vomiting found to have duodenitis, PUD  sp EGD which showed non bleeding esophageal ulcers, esophagitis, non bleeding duodenal   ulcers now with blood cultures from admission growing single set GVR.   Pt remained afebrile and wbc wnl.  Nontoxic  Bacteremia with GVR ?GNR ?contaminant  Still no ID of GVR yet  Pt doing well    Plan :   Cont Zosyn for now  If still ID of GVR by tmrw, can dc pt home on Cipro Flagyl x 5 days  Fu blood cultures  Stable from ID standpoint      Continue with present regiment.  Appropriate use of antibiotics and adverse effects reviewed.      I have discussed the above plan of care with patient/ family in detail. They expressed understanding of the  treatment plan . Risks, benefits and alternatives discussed in detail. I have asked if they have any questions or concerns and appropriately addressed them to the best of my ability .    > 35 minutes were spent in direct patient care reviewing notes, medications ,labs data/ imaging , discussion with multidisciplinary team.    Thank you for allowing me to participate in care of your patient .    Tanya Rios MD  Infectious Disease  [phone]

## 2018-10-22 NOTE — ADVANCED PRACTICE NURSE CONSULT - REASON FOR CONSULT
62y    Male    Patient is a 62y old  Male who presents with a chief complaint of abdominal pain (22 Oct 2018 15:11)      HPI:  Pt is a 61 y/o M with PMHx of Type 2 DM, HTN, HLD, obesity, nephrolithiasis (last stone 25 years ago), neuropathy, HE, NAFLD, who presents with abdominal pain and vomiting for the past two days. Pt states he has had a decreased appetite since Sunday evening along with nonradiating intermittent epigastric abd pain rated 7/10. Yesterday he felt nauseous and vomited greenish fluid once. Today, he vomited 3 more times in the morning and went to Dr. Morales's office, who recommended pt get bloodwork and have a CT Abd/Pelvis. Pt states after leaving office, he started feeling nauseous and vomited once more before coming into ED. Admits epigastric abd pain, nausea, vomiting. Denies HA, dizziness, CP, SOB, diarrhea, or bloody stools. Denies recent travel or sick contacts.    In the ED, VS: T 98.4, HR 98, /77, RR 17, sat 99% RA, WBC 5.94, H/H 12.5/42.2, plat 108, Lactate 4.1, ammonia 73, Cl 112, CO2 21, BUN 13, Cr 0.71, Glu 189, Ca 7.5, prot 5.5, alb 2.1, bili 4.0, alk phos 165,AST 29, ALT 22, amylase 20, lipase 20, CT abd/pelvis - Impression may be associated with duodenitis and/or peptic ulcer disease; recommend further clinical correlation. 4 mm nonobstructing calcification at the lower pole the left kidney. CXR showing no acute pathology. EKG showing mild prolonged Qtc at 480. (17 Oct 2018 19:30)      PAST MEDICAL & SURGICAL HISTORY:  Hepatic encephalopathy  Obesity  Fatty liver disease, nonalcoholic  Renal stones: 25 years ago  Hypertension  Neuropathy  Hypercholesteremia  Diabetes  S/P cholecystectomy      MEDICATIONS  (STANDING):  dextrose 5%. 1000 milliLiter(s) (50 mL/Hr) IV Continuous <Continuous>  dextrose 50% Injectable 12.5 Gram(s) IV Push once  dextrose 50% Injectable 25 Gram(s) IV Push once  dextrose 50% Injectable 25 Gram(s) IV Push once  enoxaparin Injectable 40 milliGRAM(s) SubCutaneous daily  gabapentin 800 milliGRAM(s) Oral four times a day  insulin glargine Injectable (LANTUS) 50 Unit(s) SubCutaneous at bedtime  insulin lispro (HumaLOG) corrective regimen sliding scale   SubCutaneous three times a day before meals  insulin lispro (HumaLOG) corrective regimen sliding scale   SubCutaneous at bedtime  insulin lispro Injectable (HumaLOG) 10 Unit(s) SubCutaneous three times a day before meals  lactulose Syrup 30 Gram(s) Oral four times a day  losartan 100 milliGRAM(s) Oral daily  pantoprazole    Tablet 40 milliGRAM(s) Oral two times a day  piperacillin/tazobactam IVPB. 3.375 Gram(s) IV Intermittent every 8 hours  rifaximin 550 milliGRAM(s) Oral two times a day  sucralfate 1 Gram(s) Oral every 6 hours  ursodiol Capsule 300 milliGRAM(s) Oral three times a day

## 2018-10-22 NOTE — PROGRESS NOTE ADULT - SUBJECTIVE AND OBJECTIVE BOX
INTERVAL HPI/OVERNIGHT EVENTS:  pt seen and examined  denies n/v/d/c/abd pain  wants to go home  afebrile overnight labs noted    MEDICATIONS  (STANDING):  dextrose 5%. 1000 milliLiter(s) (50 mL/Hr) IV Continuous <Continuous>  dextrose 50% Injectable 12.5 Gram(s) IV Push once  dextrose 50% Injectable 25 Gram(s) IV Push once  dextrose 50% Injectable 25 Gram(s) IV Push once  enoxaparin Injectable 40 milliGRAM(s) SubCutaneous daily  gabapentin 800 milliGRAM(s) Oral four times a day  insulin glargine Injectable (LANTUS) 50 Unit(s) SubCutaneous at bedtime  insulin lispro (HumaLOG) corrective regimen sliding scale   SubCutaneous three times a day before meals  insulin lispro (HumaLOG) corrective regimen sliding scale   SubCutaneous at bedtime  insulin lispro Injectable (HumaLOG) 10 Unit(s) SubCutaneous three times a day before meals  lactulose Syrup 30 Gram(s) Oral two times a day  losartan 100 milliGRAM(s) Oral daily  pantoprazole    Tablet 40 milliGRAM(s) Oral two times a day  piperacillin/tazobactam IVPB. 3.375 Gram(s) IV Intermittent every 8 hours  rifaximin 550 milliGRAM(s) Oral two times a day  sucralfate 1 Gram(s) Oral every 6 hours  ursodiol Capsule 300 milliGRAM(s) Oral three times a day    MEDICATIONS  (PRN):  dextrose 40% Gel 15 Gram(s) Oral once PRN Blood Glucose LESS THAN 70 milliGRAM(s)/deciliter  glucagon  Injectable 1 milliGRAM(s) IntraMuscular once PRN Glucose LESS THAN 70 milligrams/deciliter      Allergies    codeine (Anaphylaxis)    Intolerances        Review of Systems:    General:  No wt loss, fevers, chills, night sweats, fatigue   Eyes:  Good vision, no reported pain  ENT:  No sore throat, pain, runny nose, dysphagia  CV:  No pain, palpitations, hypo/hypertension  Resp:  No dyspnea, cough, tachypnea, wheezing  GI:  No pain, No nausea, No vomiting, No diarrhea, No constipation, No weight loss, No fever, No pruritis, No rectal bleeding, No melena, No dysphagia  :  No pain, bleeding, incontinence, nocturia  Muscle:  No pain, weakness  Neuro:  No weakness, tingling, memory problems  Psych:  No fatigue, insomnia, mood problems, depression  Endocrine:  No polyuria, polydypsia, cold/heat intolerance  Heme:  No petechiae, ecchymosis, easy bruisability  Skin:  No rash, tattoos, scars, edema      Vital Signs Last 24 Hrs  T(C): 36.6 (22 Oct 2018 04:45), Max: 36.6 (21 Oct 2018 13:19)  T(F): 97.8 (22 Oct 2018 04:45), Max: 97.8 (21 Oct 2018 13:19)  HR: 86 (22 Oct 2018 04:45) (81 - 87)  BP: 121/70 (22 Oct 2018 04:45) (118/70 - 130/67)  BP(mean): --  RR: 18 (22 Oct 2018 04:45) (17 - 19)  SpO2: 96% (22 Oct 2018 04:45) (96% - 99%)    PHYSICAL EXAM:    General:  lying in bed in nad  HEENT:  NC/AT  Chest:  dec bs   Cardiovascular:  Regular rhythm, S1, S2  Abdomen:  obese abd, soft, nt nd  Extremities:  no edema  Skin:  no rash  Neuro/Psych: awake alert responds appropriately    LABS:                        12.0   3.95  )-----------( 83       ( 22 Oct 2018 08:47 )             35.4     10-22    141  |  106  |  7   ----------------------------<  200<H>  3.9   |  29  |  0.85    Ca    8.4<L>      22 Oct 2018 08:47    TPro  6.0  /  Alb  2.4<L>  /  TBili  2.6<H>  /  DBili  x   /  AST  38<H>  /  ALT  28  /  AlkPhos  185<H>  10-22          RADIOLOGY & ADDITIONAL TESTS:

## 2018-10-22 NOTE — PROGRESS NOTE ADULT - PROBLEM SELECTOR PLAN 2
suspected JEAN  US 2/2018 noted, with liver lab w/u at that time, neg with exception of +asca/tammi  no free fluid on CT  for mri liver and mrcp  as outpt, dw pt, agreeable  cont ppi, sandra, rifaximin bid  cont lactulose, titrate for 3-4 soft bms/day  trend ammonia levels, lfts  monitor for bleeding  will follow, outpatient gi f/u

## 2018-10-22 NOTE — PROGRESS NOTE ADULT - PROBLEM SELECTOR PLAN 7
- chronic, Unclear Etiology stable, High Bili   - GI following Dr Morales D/W for MRI -Liver with IV Contrast, MRCP NON  Con as out pt .UNABLE TO BE PERFORMED 2/2 BODY HABITUS.   -, cont ppi, sandra, rifaximin bid  cont lactulose bid, titrate for 3-4 soft bms/day

## 2018-10-22 NOTE — PROGRESS NOTE ADULT - PROBLEM SELECTOR PLAN 1
will check kidney stone gamez is in progress    and pt will f/u in my office next week for f/u and prevent stone formation

## 2018-10-22 NOTE — PROGRESS NOTE ADULT - PROBLEM SELECTOR PLAN 4
Uncontrolled  A1c 10.2- holding home medications: pioglitazone, metformin, toujeo,   - Endocrine Dr. C Perlman: cont Lantus 50 units daily  cont Humalog 10 units tid before meals plus scale coverage  change to mod dose humalog scale coverage

## 2018-10-22 NOTE — PROGRESS NOTE ADULT - PROBLEM SELECTOR PLAN 5
- currently asymptomatic, AAOx3. pt with hx of HE on last admission, likely 2/2 to NAFLD.  - elevated ammonia levels chronically elevated per patient  -GI (Dr. Franks team): US 2/2018 , cont ppi, sandra, rifaximin bid  cont lactulose bid, titrate for 3-4 soft bms/day  trend ammonia levels, trend   -MRCP/ liver MRI unable to be performed 2/2 patient body habitus; will follow up - currently asymptomatic, AAOx3. pt with hx of HE on last admission, likely 2/2 to NAFLD.  - elevated ammonia levels chronically elevated per patient  -GI (Dr. Franks team): US 2/2018 , cont ppi, sandra, rifaximin bid  cont lactulose 4x day, titrate for 3-4 soft bms/day  trend ammonia levels, trend   -MRCP/ liver MRI unable to be performed 2/2 patient body habitus; will follow up

## 2018-10-22 NOTE — PROGRESS NOTE ADULT - PROBLEM SELECTOR PLAN 3
- chronic, stable   -elevated Bili -?? Etiology  - pt follows GI (Dr. Morales) outpt.  -cont ppi, sandra, rifaximin bid  cont lactulose bid, titrate for 3-4 soft bms/day  - Lasix being held  -MRCP/ liver MRI unable to be performed 2/2 patient body habitus; will follow up GI outpatient on d/c

## 2018-10-22 NOTE — PROGRESS NOTE ADULT - PROBLEM SELECTOR PLAN 1
pain resolved  CT showed duodenitis and/or peptic ulcer disease  sp egd showed non bleeding esophageal ulcers, esophagitis, non bleeding duodenal ulcers, no EV/GV  ppi bid/carafate q6  trend h/h, transfuse prn  diet as tolerated  monitor abd exam  will need close outpatient gi f/u upon dc  dc planning per primary team

## 2018-10-22 NOTE — PROGRESS NOTE ADULT - PROBLEM SELECTOR PLAN 4
bld cxs- growing gram variable rods  repeat bld cxs ngtd  cont abx per id  f/u id recs  rest of care per primary team

## 2018-10-23 VITALS
TEMPERATURE: 98 F | DIASTOLIC BLOOD PRESSURE: 67 MMHG | RESPIRATION RATE: 17 BRPM | HEART RATE: 86 BPM | OXYGEN SATURATION: 96 % | SYSTOLIC BLOOD PRESSURE: 100 MMHG

## 2018-10-23 LAB
ALBUMIN SERPL ELPH-MCNC: 2.3 G/DL — LOW (ref 3.3–5)
ALP SERPL-CCNC: 147 U/L — HIGH (ref 40–120)
ALT FLD-CCNC: 27 U/L — SIGNIFICANT CHANGE UP (ref 12–78)
AMMONIA BLD-MCNC: 122 UMOL/L — HIGH (ref 11–32)
ANION GAP SERPL CALC-SCNC: 6 MMOL/L — SIGNIFICANT CHANGE UP (ref 5–17)
AST SERPL-CCNC: 37 U/L — SIGNIFICANT CHANGE UP (ref 15–37)
BILIRUB SERPL-MCNC: 2.9 MG/DL — HIGH (ref 0.2–1.2)
BUN SERPL-MCNC: 8 MG/DL — SIGNIFICANT CHANGE UP (ref 7–23)
CALCIUM SERPL-MCNC: 8.5 MG/DL — SIGNIFICANT CHANGE UP (ref 8.5–10.1)
CHLORIDE SERPL-SCNC: 108 MMOL/L — SIGNIFICANT CHANGE UP (ref 96–108)
CO2 SERPL-SCNC: 26 MMOL/L — SIGNIFICANT CHANGE UP (ref 22–31)
CREAT SERPL-MCNC: 0.81 MG/DL — SIGNIFICANT CHANGE UP (ref 0.5–1.3)
GLUCOSE SERPL-MCNC: 162 MG/DL — HIGH (ref 70–99)
HCT VFR BLD CALC: 35 % — LOW (ref 39–50)
HGB BLD-MCNC: 12 G/DL — LOW (ref 13–17)
MCHC RBC-ENTMCNC: 33.7 PG — SIGNIFICANT CHANGE UP (ref 27–34)
MCHC RBC-ENTMCNC: 34.3 GM/DL — SIGNIFICANT CHANGE UP (ref 32–36)
MCV RBC AUTO: 98.3 FL — SIGNIFICANT CHANGE UP (ref 80–100)
NRBC # BLD: 0 /100 WBCS — SIGNIFICANT CHANGE UP (ref 0–0)
PLATELET # BLD AUTO: 87 K/UL — LOW (ref 150–400)
POTASSIUM SERPL-MCNC: 3.8 MMOL/L — SIGNIFICANT CHANGE UP (ref 3.5–5.3)
POTASSIUM SERPL-SCNC: 3.8 MMOL/L — SIGNIFICANT CHANGE UP (ref 3.5–5.3)
PROT SERPL-MCNC: 5.8 G/DL — LOW (ref 6–8.3)
RBC # BLD: 3.56 M/UL — LOW (ref 4.2–5.8)
RBC # FLD: 14 % — SIGNIFICANT CHANGE UP (ref 10.3–14.5)
SODIUM SERPL-SCNC: 140 MMOL/L — SIGNIFICANT CHANGE UP (ref 135–145)
WBC # BLD: 4.12 K/UL — SIGNIFICANT CHANGE UP (ref 3.8–10.5)
WBC # FLD AUTO: 4.12 K/UL — SIGNIFICANT CHANGE UP (ref 3.8–10.5)

## 2018-10-23 RX ORDER — CIPROFLOXACIN LACTATE 400MG/40ML
500 VIAL (ML) INTRAVENOUS EVERY 12 HOURS
Qty: 0 | Refills: 0 | Status: DISCONTINUED | OUTPATIENT
Start: 2018-10-23 | End: 2018-10-23

## 2018-10-23 RX ORDER — CIPROFLOXACIN LACTATE 400MG/40ML
1 VIAL (ML) INTRAVENOUS
Qty: 10 | Refills: 0 | OUTPATIENT
Start: 2018-10-23 | End: 2018-10-27

## 2018-10-23 RX ORDER — METRONIDAZOLE 500 MG
1 TABLET ORAL
Qty: 15 | Refills: 0 | OUTPATIENT
Start: 2018-10-23 | End: 2018-10-27

## 2018-10-23 RX ORDER — METRONIDAZOLE 500 MG
500 TABLET ORAL EVERY 8 HOURS
Qty: 0 | Refills: 0 | Status: DISCONTINUED | OUTPATIENT
Start: 2018-10-23 | End: 2018-10-23

## 2018-10-23 RX ORDER — LACTULOSE 10 G/15ML
20 SOLUTION ORAL ONCE
Qty: 0 | Refills: 0 | Status: COMPLETED | OUTPATIENT
Start: 2018-10-23 | End: 2018-10-23

## 2018-10-23 RX ADMIN — LACTULOSE 20 GRAM(S): 10 SOLUTION ORAL at 13:13

## 2018-10-23 RX ADMIN — URSODIOL 300 MILLIGRAM(S): 250 TABLET, FILM COATED ORAL at 06:07

## 2018-10-23 RX ADMIN — Medication 1 GRAM(S): at 00:00

## 2018-10-23 RX ADMIN — LOSARTAN POTASSIUM 100 MILLIGRAM(S): 100 TABLET, FILM COATED ORAL at 06:07

## 2018-10-23 RX ADMIN — Medication 1 GRAM(S): at 13:11

## 2018-10-23 RX ADMIN — GABAPENTIN 800 MILLIGRAM(S): 400 CAPSULE ORAL at 00:25

## 2018-10-23 RX ADMIN — LACTULOSE 20 GRAM(S): 10 SOLUTION ORAL at 13:16

## 2018-10-23 RX ADMIN — Medication 2: at 12:05

## 2018-10-23 RX ADMIN — Medication 2: at 08:57

## 2018-10-23 RX ADMIN — LACTULOSE 30 GRAM(S): 10 SOLUTION ORAL at 06:06

## 2018-10-23 RX ADMIN — GABAPENTIN 800 MILLIGRAM(S): 400 CAPSULE ORAL at 13:12

## 2018-10-23 RX ADMIN — Medication 10 UNIT(S): at 12:05

## 2018-10-23 RX ADMIN — ENOXAPARIN SODIUM 40 MILLIGRAM(S): 100 INJECTION SUBCUTANEOUS at 12:10

## 2018-10-23 RX ADMIN — Medication 1 GRAM(S): at 06:07

## 2018-10-23 RX ADMIN — LACTULOSE 30 GRAM(S): 10 SOLUTION ORAL at 00:00

## 2018-10-23 RX ADMIN — Medication 10 UNIT(S): at 08:58

## 2018-10-23 RX ADMIN — GABAPENTIN 800 MILLIGRAM(S): 400 CAPSULE ORAL at 06:07

## 2018-10-23 RX ADMIN — PANTOPRAZOLE SODIUM 40 MILLIGRAM(S): 20 TABLET, DELAYED RELEASE ORAL at 06:07

## 2018-10-23 RX ADMIN — PIPERACILLIN AND TAZOBACTAM 25 GRAM(S): 4; .5 INJECTION, POWDER, LYOPHILIZED, FOR SOLUTION INTRAVENOUS at 06:06

## 2018-10-23 RX ADMIN — URSODIOL 300 MILLIGRAM(S): 250 TABLET, FILM COATED ORAL at 13:11

## 2018-10-23 NOTE — PROGRESS NOTE ADULT - PROBLEM SELECTOR PLAN 9
- pt with hx of renal stones (last stone 25 years ago, passed spontaneously)  - CT Abd/Pelvis showing 4mm non obstructing calcification in lower pole of L kidney. ASYMPTOMATIC, NO CVA TENDERNESS, hemodynamically stable  - pt seen by nephro (Dr. Naqvi) will follow

## 2018-10-23 NOTE — PROGRESS NOTE ADULT - PROBLEM SELECTOR PLAN 2
suspected JEAN  US 2/2018 noted, with liver lab w/u at that time, neg with exception of +asca/tammi  no free fluid on CT  for mri liver and mrcp  as outpt, dw pt, agreeable  cont ppi, sandra, rifaximin bid  cont lactulose 30mg qid, can give extra dose now, pt with known hx of elevated ammonia, currently mentating at baseline, titrate for 4 soft bms/day, f/u appt made for 10/26  trend ammonia levels, lfts  monitor for bleeding  will follow, outpatient gi f/u as above, rec no driving/operating heavy machinery until office f/u, dw med attg

## 2018-10-23 NOTE — PROGRESS NOTE ADULT - PROBLEM SELECTOR PLAN 5
- currently asymptomatic, AAOx3. pt with hx of HE on last admission, likely 2/2 to NAFLD.  - elevated ammonia levels chronically elevated per patient  -GI (Dr. Franks team): US 2/2018 , cont ppi, sandra, rifaximin bid  cont lactulose 4x day, titrate for 3-4 soft bms/day  trend ammonia levels   -MRCP/ liver MRI unable to be performed 2/2 patient body habitus; will follow up outpatient

## 2018-10-23 NOTE — PROGRESS NOTE ADULT - SUBJECTIVE AND OBJECTIVE BOX
Patient is a 62y Male whom presented to the hospital with kidney stone   seen in am nad   PAST MEDICAL & SURGICAL HISTORY:  Obesity  Fatty liver disease, nonalcoholic  Renal stones: 25 years ago  Hypertension  Neuropathy  Hypercholesteremia  Diabetes  S/P cholecystectomy      MEDICATIONS  (STANDING):  lactulose Syrup 30 Gram(s) Oral daily  pantoprazole  Injectable 40 milliGRAM(s) IV Push every 12 hours  rifaximin 550 milliGRAM(s) Oral two times a day  sodium chloride 0.9%. 1000 milliLiter(s) (75 mL/Hr) IV Continuous <Continuous>  sucralfate suspension 1 Gram(s) Oral two times a day          REVIEW OF SYSTEMS:    CONSTITUTIONAL: No weakness, fevers or chills  RESPIRATORY: No cough, wheezing, hemoptysis; no shortness of breath  CARDIOVASCULAR: No chest pain or palpitations  GASTROINTESTINAL: No abdominal or epigastric pain. pos  nausea, vomiting,     No diarrhea or constipation. No melena   SKIN: dry                                          12.0   4.12  )-----------( 87       ( 23 Oct 2018 08:15 )             35.0       CBC Full  -  ( 23 Oct 2018 08:15 )  WBC Count : 4.12 K/uL  Hemoglobin : 12.0 g/dL  Hematocrit : 35.0 %  Platelet Count - Automated : 87 K/uL  Mean Cell Volume : 98.3 fl  Mean Cell Hemoglobin : 33.7 pg  Mean Cell Hemoglobin Concentration : 34.3 gm/dL  Auto Neutrophil # : x  Auto Lymphocyte # : x  Auto Monocyte # : x  Auto Eosinophil # : x  Auto Basophil # : x  Auto Neutrophil % : x  Auto Lymphocyte % : x  Auto Monocyte % : x  Auto Eosinophil % : x  Auto Basophil % : x      10-23    140  |  108  |  8   ----------------------------<  162<H>  3.8   |  26  |  0.81    Ca    8.5      23 Oct 2018 08:15    TPro  5.8<L>  /  Alb  2.3<L>  /  TBili  2.9<H>  /  DBili  x   /  AST  37  /  ALT  27  /  AlkPhos  147<H>  10-23      CAPILLARY BLOOD GLUCOSE      POCT Blood Glucose.: 167 mg/dL (23 Oct 2018 11:58)  POCT Blood Glucose.: 152 mg/dL (23 Oct 2018 08:17)  POCT Blood Glucose.: 117 mg/dL (22 Oct 2018 21:59)  POCT Blood Glucose.: 177 mg/dL (22 Oct 2018 17:31)      Vital Signs Last 24 Hrs  T(C): 36.9 (23 Oct 2018 13:24), Max: 36.9 (23 Oct 2018 13:24)  T(F): 98.5 (23 Oct 2018 13:24), Max: 98.5 (23 Oct 2018 13:24)  HR: 86 (23 Oct 2018 13:24) (85 - 87)  BP: 100/67 (23 Oct 2018 13:24) (100/67 - 128/70)  BP(mean): --  RR: 17 (23 Oct 2018 13:24) (17 - 17)  SpO2: 96% (23 Oct 2018 13:24) (96% - 99%)                         PHYSICAL EXAM:    Constitutional: NAD  HEENT: conjunctive   clear   Neck:  No JVD  Respiratory: CTAB  Cardiovascular: S1 and S2  Gastrointestinal: BS+, soft, NT/ND  Extremities: No peripheral edema  Neurological: A/O x 3, no focal deficits  Psychiatric: Normal mood, normal affect  : No Beasley  Skin: No rashes  Access: Not applicable

## 2018-10-23 NOTE — PROGRESS NOTE ADULT - PROVIDER SPECIALTY LIST ADULT
Anesthesia
Endocrinology
Endocrinology
Gastroenterology
Hospitalist
Hospitalist
Infectious Disease
Internal Medicine
Nephrology
Endocrinology
Internal Medicine

## 2018-10-23 NOTE — PROGRESS NOTE ADULT - ATTENDING COMMENTS
Advanced care planning was discussed with patient and family.  Advanced care planning forms were reviewed and discussed.  Risks, benefits and alternatives of gastroenterologic procedures were discussed in detail and all questions were answered.    30 minutes spent.
Pt seen, examined, case & care plan d/w pt, , GI DR Morales at detail. Unable to get MRI for patient.  ID DR Arreguin V   , follow Repeat Blood c/s-NEG  AM labs, D/C Planning as per ID still awaiting Iden. of Blood cultures
Pt seen, examined, case & care plan d/w pt, , GI DR Morales at detail. Unable to get MRI for patient.  ID DR Arreguin  , follow Repeat Blood c/s  AM labs, D/C Planning as per ID
Pt seen, examined, case & care plan d/w pt, residents at detail.  D/C plan in AM
Pt seen, examined, Case & care plan d/w pt, residents at detail.  D/W GI DR Morales, PA & ID Dr Rios at detail.  D/C Home today, Total D/C Care time is 45 minutes.
Pt seen, examined, case & care plan d/w pt, residents  Gi DR Morales at detail.  S/P EGD- Multiple Duodenal ulcers, Gastritis, Esophagitis- No Bleeding, Resume diet  Plan for MRI liver with contrast, MRCP NOn cont r/o CBD stricture,  AM labs , po diet
Pt seen, examined, case & care plan d/w pt, residents , GI DR Franks at detail. Unable to get MRI In patient.  ID DR Rios , follow Repeat Blood c/s  AM labs, D/C Planning as per ID

## 2018-10-23 NOTE — PROGRESS NOTE ADULT - SUBJECTIVE AND OBJECTIVE BOX
CAPILLARY BLOOD GLUCOSE      POCT Blood Glucose.: 152 mg/dL (23 Oct 2018 08:17)  POCT Blood Glucose.: 117 mg/dL (22 Oct 2018 21:59)  POCT Blood Glucose.: 177 mg/dL (22 Oct 2018 17:31)  POCT Blood Glucose.: 164 mg/dL (22 Oct 2018 11:40)      Vital Signs Last 24 Hrs  T(C): 36.6 (23 Oct 2018 05:12), Max: 36.6 (22 Oct 2018 20:10)  T(F): 97.8 (23 Oct 2018 05:12), Max: 97.9 (22 Oct 2018 20:10)  HR: 87 (23 Oct 2018 05:12) (85 - 87)  BP: 128/70 (23 Oct 2018 05:12) (121/67 - 132/78)  BP(mean): --  RR: 17 (23 Oct 2018 05:12) (17 - 17)  SpO2: 97% (23 Oct 2018 05:12) (97% - 99%)    General: WN/WD NAD  Respiratory: CTA B/L  CV: RRR, S1S2, no murmurs, rubs or gallops  Abdominal: Soft, NT, ND +BS, Last BM  Extremities: No edema, + peripheral pulses     10-23    140  |  108  |  8   ----------------------------<  162<H>  3.8   |  26  |  0.81    Ca    8.5      23 Oct 2018 08:15    TPro  5.8<L>  /  Alb  2.3<L>  /  TBili  2.9<H>  /  DBili  x   /  AST  37  /  ALT  27  /  AlkPhos  147<H>  10-23      dextrose 40% Gel 15 Gram(s) Oral once PRN  dextrose 50% Injectable 12.5 Gram(s) IV Push once  dextrose 50% Injectable 25 Gram(s) IV Push once  dextrose 50% Injectable 25 Gram(s) IV Push once  glucagon  Injectable 1 milliGRAM(s) IntraMuscular once PRN  insulin glargine Injectable (LANTUS) 50 Unit(s) SubCutaneous at bedtime  insulin lispro (HumaLOG) corrective regimen sliding scale   SubCutaneous three times a day before meals  insulin lispro (HumaLOG) corrective regimen sliding scale   SubCutaneous at bedtime  insulin lispro Injectable (HumaLOG) 10 Unit(s) SubCutaneous three times a day before meals

## 2018-10-23 NOTE — PROGRESS NOTE ADULT - PROBLEM SELECTOR PLAN 10
PADUA SCORE: 4, pharmacologic ppx indicated  Lovenox 40mg SubQ daily for dvt ppx    11. Per phone discussion with Pt's wife, Pt was started on Lasix in June/July for noted LE edema. Lasix ok to resume.

## 2018-10-23 NOTE — PROGRESS NOTE ADULT - REASON FOR ADMISSION
abdominal pain

## 2018-10-23 NOTE — PROGRESS NOTE ADULT - SUBJECTIVE AND OBJECTIVE BOX
INTERVAL HPI/OVERNIGHT EVENTS:  pt seen and examined  denies n/v/abd pain, +bms  wants to go home  no acute gi issues overnight per nursing  afebrile overnight labs noted    MEDICATIONS  (STANDING):  dextrose 5%. 1000 milliLiter(s) (50 mL/Hr) IV Continuous <Continuous>  dextrose 50% Injectable 12.5 Gram(s) IV Push once  dextrose 50% Injectable 25 Gram(s) IV Push once  dextrose 50% Injectable 25 Gram(s) IV Push once  enoxaparin Injectable 40 milliGRAM(s) SubCutaneous daily  gabapentin 800 milliGRAM(s) Oral four times a day  insulin glargine Injectable (LANTUS) 50 Unit(s) SubCutaneous at bedtime  insulin lispro (HumaLOG) corrective regimen sliding scale   SubCutaneous three times a day before meals  insulin lispro (HumaLOG) corrective regimen sliding scale   SubCutaneous at bedtime  insulin lispro Injectable (HumaLOG) 10 Unit(s) SubCutaneous three times a day before meals  lactulose Syrup 30 Gram(s) Oral four times a day  lactulose Syrup 20 Gram(s) Oral once  losartan 100 milliGRAM(s) Oral daily  pantoprazole    Tablet 40 milliGRAM(s) Oral two times a day  piperacillin/tazobactam IVPB. 3.375 Gram(s) IV Intermittent every 8 hours  rifaximin 550 milliGRAM(s) Oral two times a day  sucralfate 1 Gram(s) Oral every 6 hours  ursodiol Capsule 300 milliGRAM(s) Oral three times a day    MEDICATIONS  (PRN):  dextrose 40% Gel 15 Gram(s) Oral once PRN Blood Glucose LESS THAN 70 milliGRAM(s)/deciliter  glucagon  Injectable 1 milliGRAM(s) IntraMuscular once PRN Glucose LESS THAN 70 milligrams/deciliter      Allergies    codeine (Anaphylaxis)    Intolerances        Review of Systems:    General:  No wt loss, fevers, chills, night sweats, fatigue   Eyes:  Good vision, no reported pain  ENT:  No sore throat, pain, runny nose, dysphagia  CV:  No pain, palpitations, hypo/hypertension  Resp:  No dyspnea, cough, tachypnea, wheezing  GI:  No pain, No nausea, No vomiting, No diarrhea, No constipation, No weight loss, No fever, No pruritis, No rectal bleeding, No melena, No dysphagia  :  No pain, bleeding, incontinence, nocturia  Muscle:  No pain, weakness  Neuro:  No weakness, tingling, memory problems  Psych:  No fatigue, insomnia, mood problems, depression  Endocrine:  No polyuria, polydypsia, cold/heat intolerance  Heme:  No petechiae, ecchymosis, easy bruisability  Skin:  No rash, tattoos, scars, edema      Vital Signs Last 24 Hrs  T(C): 36.6 (23 Oct 2018 05:12), Max: 36.6 (22 Oct 2018 20:10)  T(F): 97.8 (23 Oct 2018 05:12), Max: 97.9 (22 Oct 2018 20:10)  HR: 87 (23 Oct 2018 05:12) (85 - 87)  BP: 128/70 (23 Oct 2018 05:12) (121/67 - 132/78)  BP(mean): --  RR: 17 (23 Oct 2018 05:12) (17 - 17)  SpO2: 97% (23 Oct 2018 05:12) (97% - 99%)    PHYSICAL EXAM:    PHYSICAL EXAM:    General:  lying in bed in nad  HEENT:  NC/AT  Chest:  dec bs   Cardiovascular:  Regular rhythm, S1, S2  Abdomen:  obese abd, soft, nt nd  Extremities:  mild edema  Skin:  no rash  Neuro/Psych: awake alert responds appropriately      LABS:                        12.0   4.12  )-----------( 87       ( 23 Oct 2018 08:15 )             35.0     10-23    140  |  108  |  8   ----------------------------<  162<H>  3.8   |  26  |  0.81    Ca    8.5      23 Oct 2018 08:15    TPro  5.8<L>  /  Alb  2.3<L>  /  TBili  2.9<H>  /  DBili  x   /  AST  37  /  ALT  27  /  AlkPhos  147<H>  10-23          RADIOLOGY & ADDITIONAL TESTS:

## 2018-10-23 NOTE — PROGRESS NOTE ADULT - SUBJECTIVE AND OBJECTIVE BOX
Patient is a 62y old  Male who presents with a chief complaint of abdominal pain (23 Oct 2018 08:53)      INTERVAL HPI:  Pt is a 61 y/o M with PMHx of Type 2 DM, HTN, HLD, obesity, nephrolithiasis (last stone 25 years ago), neuropathy, HE, NAFLD, who presents with abdominal pain and vomiting for the past two days. Pt states he has had a decreased appetite since Sunday evening along with non radiating intermittent epigastric abd pain rated 7/10. Yesterday he felt nauseous and vomited greenish fluid once. Today, he vomited 3 more times in the morning and went to Dr. Morales's office, who recommended pt get bloodwork and have a CT Abd/Pelvis. Pt states after leaving office, he started feeling nauseous and vomited once more before coming into ED. Admits epigastric abd pain, nausea, vomiting. Denies HA, dizziness, CP, SOB, diarrhea, or bloody stools. Denies recent travel or sick contacts.    In the ED, VS: T 98.4, HR 98, /77, RR 17, sat 99% RA, WBC 5.94, H/H 12.5/42.2, plat 108, Lactate 4.1, ammonia 73, Cl 112, CO2 21, BUN 13, Cr 0.71, Glu 189, Ca 7.5, prot 5.5, alb 2.1, bili 4.0, alk phos 165,AST 29, ALT 22, amylase 20, lipase 20, CT abd/pelvis - Impression may be associated with duodenitis and/or peptic ulcer disease; recommend further clinical correlation. 4 mm non obstructing calcification at the lower pole the left kidney. CXR showing no acute pathology. EKG showing mild prolonged Qtc at 480.    10/19/18: Overnight ++blood cx gram variable rods, blood cx sent, started on zosyn. ID Dr. REYNOSO called. EGD done, showing non bleeding esophageal ulcers, esophagitis, non bleeding duodenal ulcers, no EV/GV. Pt seen, examined. Denying abdominal pain, nausea/vomiting, diarrhea. No fevers, chills, night sweats. NO other complaints, per pt he could not have MRI abd/ MRCP due to his body habitus. Pt is on IV Zosyn . Follow Repeat blood culture.  10/20/18: Pt seen, Examined, No Complaints, wants to go home, Awaiting ID for gram Variable Rods, On IV Zosyn.   10/21/18: Pt seen, examined, Feels well, On IV Zosyn for Bacteremia with Gram variable rods In Blood cultures. No Complaints  10/22/18: Pt seen, examined, Feels well, 2 BMs this am. On IV Zosyn for Bacteremia with Gram variable rods In Blood cultures, no growth in repeat BCx awaiting ID recs re: antibiotics. No Complaints, PAt weil saw pt.  10/23/18: Pt seen, examined, Feels well. No complaints. Micro being sent to [facility] for analysis. Per ID, ok to switch IV zosyn to PO cipro flagyl for 5 days.     OVERNIGHT EVENTS: NONE    Home Medications:  HumaLOG 100 units/mL subcutaneous solution: 20 unit(s) subcutaneous 2 times a day(breakfast and lunch) (17 Oct 2018 20:22)  HumaLOG 100 units/mL subcutaneous solution: 30 unit(s) subcutaneous once a day(@dinner) (17 Oct 2018 20:22)  irbesartan 300 mg oral tablet: 1 tab(s) orally once a day (17 Oct 2018 20:22)  phytonadione 100 mcg oral tablet: 1 tab(s) orally once a day (17 Oct 2018 20:22)  potassium chloride 10 mEq oral tablet, extended release: 1 tab(s) orally once a day (17 Oct 2018 20:22)  pravastatin 40 mg oral tablet: 1 tab(s) orally once a day (17 Oct 2018 20:22)  Vitamin D3 2000 intl units oral tablet: 1 tab(s) orally once a day (17 Oct 2018 20:22)      MEDICATIONS  (STANDING):  dextrose 5%. 1000 milliLiter(s) (50 mL/Hr) IV Continuous <Continuous>  dextrose 50% Injectable 12.5 Gram(s) IV Push once  dextrose 50% Injectable 25 Gram(s) IV Push once  dextrose 50% Injectable 25 Gram(s) IV Push once  enoxaparin Injectable 40 milliGRAM(s) SubCutaneous daily  gabapentin 800 milliGRAM(s) Oral four times a day  insulin glargine Injectable (LANTUS) 50 Unit(s) SubCutaneous at bedtime  insulin lispro (HumaLOG) corrective regimen sliding scale   SubCutaneous three times a day before meals  insulin lispro (HumaLOG) corrective regimen sliding scale   SubCutaneous at bedtime  insulin lispro Injectable (HumaLOG) 10 Unit(s) SubCutaneous three times a day before meals  lactulose Syrup 30 Gram(s) Oral four times a day  losartan 100 milliGRAM(s) Oral daily  pantoprazole    Tablet 40 milliGRAM(s) Oral two times a day  piperacillin/tazobactam IVPB. 3.375 Gram(s) IV Intermittent every 8 hours  rifaximin 550 milliGRAM(s) Oral two times a day  sucralfate 1 Gram(s) Oral every 6 hours  ursodiol Capsule 300 milliGRAM(s) Oral three times a day    MEDICATIONS  (PRN):  dextrose 40% Gel 15 Gram(s) Oral once PRN Blood Glucose LESS THAN 70 milliGRAM(s)/deciliter  glucagon  Injectable 1 milliGRAM(s) IntraMuscular once PRN Glucose LESS THAN 70 milligrams/deciliter      Allergies    codeine (Anaphylaxis)    Intolerances        REVIEW OF SYSTEMS:  CONSTITUTIONAL: No fever, No chills, No fatigue  EYES: No eye pain,  No visual disturbances  ENMT:  No ear pain, No Congestion  RESPIRATORY: No cough, or wheezing, No shortness of breath  CARDIOVASCULAR: No chest pain, or palpitations  GASTROINTESTINAL: No abdominal or epigastric pain. No nausea, No vomiting; No diarrhea or constipation. [ x ] BM  GENITOURINARY: No dysuria, No frequency, No urgency  NEUROLOGICAL: No headaches, No dizziness, No tremors, No weakness  EXT: No Swelling, No Pain, No Edema  SKIN:  [ x ] No itching, burning, rashes, or lesions   MUSCULOSKELETAL: No joint pain or swelling  PAIN SCALE: [ x ] None  [  ] Other-  ROS Unable to obtain due to - [  ] Dementia  [  ] Lethargy  [  ] Sedated [  ] non verbal  REST OF REVIEW Of SYSTEM - [ x ] Normal     Vital Signs Last 24 Hrs  T(C): 36.6 (23 Oct 2018 05:12), Max: 36.6 (22 Oct 2018 20:10)  T(F): 97.8 (23 Oct 2018 05:12), Max: 97.9 (22 Oct 2018 20:10)  HR: 87 (23 Oct 2018 05:12) (85 - 87)  BP: 128/70 (23 Oct 2018 05:12) (121/67 - 132/78)  BP(mean): --  RR: 17 (23 Oct 2018 05:12) (17 - 17)  SpO2: 97% (23 Oct 2018 05:12) (97% - 99%)  Finger Stick        10-22 @ 07:01  -  10-23 @ 07:00  --------------------------------------------------------  IN: 860 mL / OUT: 0 mL / NET: 860 mL        PHYSICAL EXAM:  GENERAL:  [ x ] NAD , [ x ] well appearing, [  ] Agitated, [  ] Drowsy,  [  ] Lethargy, [  ] confused   HEAD:  [ x ] Normal, [  ] Other  EYES:  [ x ] EOMI, [ x ] PERRLA, [ x ] conjunctiva and sclera clear normal, [  ] Other,  [  ] Pallor,[  ] Discharge  ENMT:  [ x ] Normal, [ x ] Moist mucous membranes, [  ] Good dentition, [  ] No Thrush  NECK:  [ x ] Supple, [ x ] No JVD, [ x ] Normal thyroid, [  ] Lymphadenopathy [  ] Other  CHEST/LUNG:  [ x ] Clear to auscultation bilaterally, [ x ] Breath Sounds equal OR Decrease,  [ x ] No rales, [ x ] No rhonchi  [ x ]  No wheezing  HEART:  [ x ] Regular rate and rhythm, [  ] tachycardia, [  ] Bradycardia,  [  ] irregular  [ x ] No murmurs, No rubs, No gallops, [  ] PPM in place (Mfr:  )  ABDOMEN:  [ x ] Soft, [ x ] Nontender, [ x ] Nondistended, [  ] No mass, [ x ] Bowel sounds present, [ x ] obese  NERVOUS SYSTEM:  [ x ] Alert & Oriented X3, [ x ] Nonfocal  [  ] Confusion  [  ] Encephalopathic [  ] Sedated [  ] Unable to assess, [  ] Other-  EXTREMITIES: [ x ] 2+ Peripheral Pulses, No clubbing, No cyanosis,  [  ] edema B/L lower EXT. [  ] PVD stasis skin changes B/L Lower EXT  LYMPH: No lymphadenopathy noted  SKIN:  [ x ] No rashes or lesions, [  ] Pressure Ulcers, [  ] ecchymosis, [  ] Skin Tears, [  ] Other    DIET: consistent carbohydrate    LABS:                        12.0   4.12  )-----------( 87       ( 23 Oct 2018 08:15 )             35.0     23 Oct 2018 08:15    140    |  108    |  8      ----------------------------<  162    3.8     |  26     |  0.81     Ca    8.5        23 Oct 2018 08:15    TPro  5.8    /  Alb  2.3    /  TBili  2.9    /  DBili  x      /  AST  37     /  ALT  27     /  AlkPhos  147    23 Oct 2018 08:15          Culture Results:   No growth to date. (10-19 @ 10:42)  Culture Results:   No growth to date. (10-19 @ 10:42)  Culture Results:   No growth at 5 days. (10-17 @ 18:43)  Culture Results:   Growth in anaerobic bottle: Gram Variable Rods Sent to  [facility]   for Identification (10-17 @ 18:43)                  Culture - Blood (collected 19 Oct 2018 10:42)  Source: .Blood Blood  Preliminary Report (20 Oct 2018 11:00):    No growth to date.    Culture - Blood (collected 19 Oct 2018 10:42)  Source: .Blood Blood  Preliminary Report (20 Oct 2018 11:00):    No growth to date.    Culture - Blood (collected 17 Oct 2018 18:43)  Source: .Blood Blood  Gram Stain (18 Oct 2018 23:09):    Growth in anaerobic bottle: Gram Variable Rods  Final Report (22 Oct 2018 19:58):    Growth in anaerobic bottle: Gram Variable Rods Sent to    [place] [facility]     for Identification    Culture - Blood (collected 17 Oct 2018 18:43)  Source: .Blood Blood  Final Report (22 Oct 2018 19:01):    No growth at 5 days.         Anemia Panel:      Thyroid Panel:        Amylase, Serum Total: 20 U/L (10-17-18 @ 15:11)  Lipase, Serum: 54 U/L (10-17-18 @ 15:11)          RADIOLOGY & ADDITIONAL TESTS:  New imaging reviewed    HEALTH ISSUES - PROBLEM Dx:  Bacteremia: Bacteremia  Positive blood culture: Positive blood culture  Diabetes mellitus type 2, uncontrolled: Diabetes mellitus type 2, uncontrolled  Need for prophylactic measure: Need for prophylactic measure  Hypercholesteremia: Hypercholesteremia  Hepatic encephalopathy: Hepatic encephalopathy  Fatty liver disease, nonalcoholic: Fatty liver disease, nonalcoholic  Neuropathy: Neuropathy  Type 2 diabetes mellitus: Type 2 diabetes mellitus  Abdominal pain: Abdominal pain  Obesity: Obesity  Diabetes: Diabetes  Hypertension: Hypertension  Stone in kidney: Stone in kidney          Consultant(s) Notes Reviewed:  [ x ] YES     Care Discussed with [X] Consultants  [ x ] Patient  [  ] Family  [  ]   [  ] Social Service  [  ] RN, [  ] Physical Therapy  DVT PPX: [ x ] Lovenox, [  ] S C Heparin, [  ] Coumadin, [  ] Xarelto, [  ] Eliquis, [  ] Pradaxa, [  ] IV Heparin drip, [  ] SCD [  ] Contraindication 2 to GI Bleed,[  ] Ambulation  Advanced directive: [ x ] None, [  ] DNR/DNI Patient is a 62y old  Male who presents with a chief complaint of abdominal pain (23 Oct 2018 08:53)      INTERVAL HPI:  Pt is a 61 y/o M with PMHx of Type 2 DM, HTN, HLD, obesity, nephrolithiasis (last stone 25 years ago), neuropathy, HE, NAFLD, who presents with abdominal pain and vomiting for the past two days. Pt states he has had a decreased appetite since Sunday evening along with non radiating intermittent epigastric abd pain rated 7/10. Yesterday he felt nauseous and vomited greenish fluid once. Today, he vomited 3 more times in the morning and went to Dr. Morales's office, who recommended pt get bloodwork and have a CT Abd/Pelvis. Pt states after leaving office, he started feeling nauseous and vomited once more before coming into ED. Admits epigastric abd pain, nausea, vomiting. Denies HA, dizziness, CP, SOB, diarrhea, or bloody stools. Denies recent travel or sick contacts.    In the ED, VS: T 98.4, HR 98, /77, RR 17, sat 99% RA, WBC 5.94, H/H 12.5/42.2, plat 108, Lactate 4.1, ammonia 73, Cl 112, CO2 21, BUN 13, Cr 0.71, Glu 189, Ca 7.5, prot 5.5, alb 2.1, bili 4.0, alk phos 165,AST 29, ALT 22, amylase 20, lipase 20, CT abd/pelvis - Impression may be associated with duodenitis and/or peptic ulcer disease; recommend further clinical correlation. 4 mm non obstructing calcification at the lower pole the left kidney. CXR showing no acute pathology. EKG showing mild prolonged Qtc at 480.    10/19/18: Overnight ++blood cx gram variable rods, blood cx sent, started on zosyn. ID Dr. REYNOSO called. EGD done, showing non bleeding esophageal ulcers, esophagitis, non bleeding duodenal ulcers, no EV/GV. Pt seen, examined. Denying abdominal pain, nausea/vomiting, diarrhea. No fevers, chills, night sweats. NO other complaints, per pt he could not have MRI abd/ MRCP due to his body habitus. Pt is on IV Zosyn . Follow Repeat blood culture.  10/20/18: Pt seen, Examined, No Complaints, wants to go home, Awaiting ID for gram Variable Rods, On IV Zosyn.   10/21/18: Pt seen, examined, Feels well, On IV Zosyn for Bacteremia with Gram variable rods In Blood cultures. No Complaints  10/22/18: Pt seen, examined, Feels well, 2 BMs this am. On IV Zosyn for Bacteremia with Gram variable rods In Blood cultures, no growth in repeat BCx awaiting ID recs re: antibiotics. No Complaints, PAt weil saw pt.  10/23/18: Pt seen, examined, Feels well. No complaints. Micro being sent to [facility] for analysis. Per ID, ok to switch IV zosyn, change  to PO Cipro &  flagyl for 5 days as per DR Gabriel BULLARD. Ammonia level is High today, D/W GI.    OVERNIGHT EVENTS: NONE    Home Medications:  HumaLOG 100 units/mL subcutaneous solution: 20 unit(s) subcutaneous 2 times a day(breakfast and lunch) (17 Oct 2018 20:22)  HumaLOG 100 units/mL subcutaneous solution: 30 unit(s) subcutaneous once a day(@dinner) (17 Oct 2018 20:22)  irbesartan 300 mg oral tablet: 1 tab(s) orally once a day (17 Oct 2018 20:22)  phytonadione 100 mcg oral tablet: 1 tab(s) orally once a day (17 Oct 2018 20:22)  potassium chloride 10 mEq oral tablet, extended release: 1 tab(s) orally once a day (17 Oct 2018 20:22)  pravastatin 40 mg oral tablet: 1 tab(s) orally once a day (17 Oct 2018 20:22)  Vitamin D3 2000 intl units oral tablet: 1 tab(s) orally once a day (17 Oct 2018 20:22)      MEDICATIONS  (STANDING):  dextrose 5%. 1000 milliLiter(s) (50 mL/Hr) IV Continuous <Continuous>  dextrose 50% Injectable 12.5 Gram(s) IV Push once  dextrose 50% Injectable 25 Gram(s) IV Push once  dextrose 50% Injectable 25 Gram(s) IV Push once  enoxaparin Injectable 40 milliGRAM(s) SubCutaneous daily  gabapentin 800 milliGRAM(s) Oral four times a day  insulin glargine Injectable (LANTUS) 50 Unit(s) SubCutaneous at bedtime  insulin lispro (HumaLOG) corrective regimen sliding scale   SubCutaneous three times a day before meals  insulin lispro (HumaLOG) corrective regimen sliding scale   SubCutaneous at bedtime  insulin lispro Injectable (HumaLOG) 10 Unit(s) SubCutaneous three times a day before meals  lactulose Syrup 30 Gram(s) Oral four times a day  losartan 100 milliGRAM(s) Oral daily  pantoprazole    Tablet 40 milliGRAM(s) Oral two times a day  piperacillin/tazobactam IVPB. 3.375 Gram(s) IV Intermittent every 8 hours  rifaximin 550 milliGRAM(s) Oral two times a day  sucralfate 1 Gram(s) Oral every 6 hours  ursodiol Capsule 300 milliGRAM(s) Oral three times a day    MEDICATIONS  (PRN):  dextrose 40% Gel 15 Gram(s) Oral once PRN Blood Glucose LESS THAN 70 milliGRAM(s)/deciliter  glucagon  Injectable 1 milliGRAM(s) IntraMuscular once PRN Glucose LESS THAN 70 milligrams/deciliter      Allergies    codeine (Anaphylaxis)    Intolerances        REVIEW OF SYSTEMS: No Complaints , No Confusion  CONSTITUTIONAL: No fever, No chills, No fatigue  EYES: No eye pain,  No visual disturbances  ENMT:  No ear pain, No Congestion  RESPIRATORY: No cough, or wheezing, No shortness of breath  CARDIOVASCULAR: No chest pain, or palpitations  GASTROINTESTINAL: No abdominal or epigastric pain. No nausea, No vomiting; No diarrhea or constipation. [ x ] BM-Loose  GENITOURINARY: No dysuria, No frequency, No urgency  NEUROLOGICAL: No headaches, No dizziness, No tremors, No weakness  EXT: No Swelling, No Pain, No Edema  SKIN:  [ x ] No itching, burning, rashes, or lesions   MUSCULOSKELETAL: No joint pain or swelling  PAIN SCALE: [ x ] None  [  ] Other-  ROS Unable to obtain due to - [  ] Dementia  [  ] Lethargy  [  ] Sedated [  ] non verbal  REST OF REVIEW Of SYSTEM - [ x ] Normal     Vital Signs Last 24 Hrs  T(C): 36.6 (23 Oct 2018 05:12), Max: 36.6 (22 Oct 2018 20:10)  T(F): 97.8 (23 Oct 2018 05:12), Max: 97.9 (22 Oct 2018 20:10)  HR: 87 (23 Oct 2018 05:12) (85 - 87)  BP: 128/70 (23 Oct 2018 05:12) (121/67 - 132/78)  BP(mean): --  RR: 17 (23 Oct 2018 05:12) (17 - 17)  SpO2: 97% (23 Oct 2018 05:12) (97% - 99%)  Finger Stick        10-22 @ 07:01  -  10-23 @ 07:00  --------------------------------------------------------  IN: 860 mL / OUT: 0 mL / NET: 860 mL        PHYSICAL EXAM:  GENERAL:  [ x ] NAD , [ x ] well appearing, [  ] Agitated, [  ] Drowsy,  [  ] Lethargy, [  ] confused   HEAD:  [ x ] Normal, [  ] Other  EYES:  [ x ] EOMI, [ x ] PERRLA, [ x ] conjunctiva and sclera clear normal, [  ] Other,  [  ] Pallor,[  ] Discharge  ENMT:  [ x ] Normal, [ x ] Moist mucous membranes, [  ] Good dentition, [  ] No Thrush  NECK:  [ x ] Supple, [ x ] No JVD, [ x ] Normal thyroid, [  ] Lymphadenopathy [  ] Other  CHEST/LUNG:  [ x ] Clear to auscultation bilaterally, [ x ] Breath Sounds equal B/L   [ x ] No rales, [ x ] No rhonchi  [ x ]  No wheezing  HEART:  [ x ] Regular rate and rhythm, [  ] tachycardia, [  ] Bradycardia,  [  ] irregular  [ x ] No murmurs, No rubs, No gallops, [  ] PPM in place (Mfr:  )  ABDOMEN:  [ x ] Soft, [ x ] Nontender, [ x ] Nondistended, [x  ] No mass, [ x ] Bowel sounds present, [ x ] obese  NERVOUS SYSTEM:  [ x ] Alert & Oriented X3, [ x ] Nonfocal  [  ] Confusion  [  ] Encephalopathic [  ] Sedated [  ] Unable to assess, [  ] Other-  EXTREMITIES: [ x ] 2+ Peripheral Pulses, No clubbing, No cyanosis,  [  ] edema B/L lower EXT. [  ] PVD stasis skin changes B/L Lower EXT  LYMPH: No lymphadenopathy noted  SKIN:  [ x ] No rashes or lesions, [  ] Pressure Ulcers, [  ] ecchymosis, [  ] Skin Tears, [  ] Other    DIET: consistent carbohydrate, Low  protein    LABS:                        12.0   4.12  )-----------( 87       ( 23 Oct 2018 08:15 )             35.0     23 Oct 2018 08:15    140    |  108    |  8      ----------------------------<  162    3.8     |  26     |  0.81     Ca    8.5        23 Oct 2018 08:15    TPro  5.8    /  Alb  2.3    /  TBili  2.9    /  DBili  x      /  AST  37     /  ALT  27     /  AlkPhos  147    23 Oct 2018 08:15    Ammonia, Serum (10.23.18 @ 08:15)    Ammonia, Serum: 122 umol/L          Culture Results:   No growth to date. (10-19 @ 10:42)  Culture Results:   No growth to date. (10-19 @ 10:42)  Culture Results:   No growth at 5 days. (10-17 @ 18:43)  Culture Results:   Growth in anaerobic bottle: Gram Variable Rods Sent to  [facility]   for Identification (10-17 @ 18:43)      Culture - Blood (collected 19 Oct 2018 10:42)  Source: .Blood Blood  Preliminary Report (20 Oct 2018 11:00):    No growth to date.    Culture - Blood (collected 19 Oct 2018 10:42)  Source: .Blood Blood  Preliminary Report (20 Oct 2018 11:00):    No growth to date.    Culture - Blood (collected 17 Oct 2018 18:43)  Source: .Blood Blood  Gram Stain (18 Oct 2018 23:09):    Growth in anaerobic bottle: Gram Variable Rods  Final Report (22 Oct 2018 19:58):    Growth in anaerobic bottle: Gram Variable Rods Sent to    [place] [facility]     for Identification    Culture - Blood (collected 17 Oct 2018 18:43)  Source: .Blood Blood  Final Report (22 Oct 2018 19:01):    No growth at 5 days.       Amylase, Serum Total: 20 U/L (10-17-18 @ 15:11)  Lipase, Serum: 54 U/L (10-17-18 @ 15:11)    RADIOLOGY & ADDITIONAL TESTS:  New imaging reviewed    HEALTH ISSUES - PROBLEM Dx:  Bacteremia: Bacteremia  Positive blood culture: Positive blood culture  Diabetes mellitus type 2, uncontrolled: Diabetes mellitus type 2, uncontrolled  Need for prophylactic measure: Need for prophylactic measure  Hypercholesteremia: Hypercholesteremia  Hepatic encephalopathy: Hepatic encephalopathy  Fatty liver disease, nonalcoholic: Fatty liver disease, nonalcoholic  Neuropathy: Neuropathy  Type 2 diabetes mellitus: Type 2 diabetes mellitus  Abdominal pain: Abdominal pain  Obesity: Obesity  Diabetes: Diabetes  Hypertension: Hypertension  Stone in kidney: Stone in kidney    Consultant(s) Notes Reviewed:  [ x ] YES     Care Discussed with [X] Consultants  [ x ] Patient  [  ] Family  [  ]   [  ] Social Service  [ x ] RN, [  ] Physical Therapy  DVT PPX: [ x ] Lovenox, [  ] S C Heparin, [  ] Coumadin, [  ] Xarelto, [  ] Eliquis, [  ] Pradaxa, [  ] IV Heparin drip, [  ] SCD [  ] Contraindication 2 to GI Bleed,[  ] Ambulation  Advanced directive: [ x ] None, [  ] DNR/DNI

## 2018-10-23 NOTE — PROGRESS NOTE ADULT - PROBLEM SELECTOR PLAN 1
-Blood cx 10/17 growing gram variable rods. F/u 10/19 blood cultures. AFEBRILE NO LEUKOCYTOSIS.  - discontinue IV zosyn q 8 hrs  -ID dr. Rios/ Dr megan stephenson - to change to PO cipro, flagyl for 5 days  -Repeat Blood culture- NEG so far. D/W DR Rios at detail, D/C plan in AM as per C/s identity -Blood cx 10/17 growing gram variable rods. F/u 10/19 blood cultures. AFEBRILE NO LEUKOCYTOSIS., Micro sent culture to [facility]  - discontinue IV zosyn q 8 hrs  -ID dr. Rios/ Dr megan stephenson - to change to PO cipro, flagyl for 5 days  -Repeat Blood culture- NEG so far. D/W DR Rios at detail, D/C plan today

## 2018-10-23 NOTE — PROGRESS NOTE ADULT - PROBLEM SELECTOR PLAN 1
cont lantus 50 units qhs  cont humalog 10 units 3x/day   cont humalog scale coverage  goal bg 100-180 in hosp setting

## 2018-10-23 NOTE — PROGRESS NOTE ADULT - SUBJECTIVE AND OBJECTIVE BOX
JESSICA MARTIN is a 62yMale , patient examined and chart reviewed.     INTERVAL HPI/ OVERNIGHT EVENTS:   Doing well. Afebrile.  No events. Sitting in chair.  Denies pain.    PAST MEDICAL & SURGICAL HISTORY:  Hepatic encephalopathy  Obesity  Fatty liver disease, nonalcoholic  Renal stones: 25 years ago  Hypertension  Neuropathy  Hypercholesteremia  Diabetes  S/P cholecystectomy      For details regarding the patient's social history, family history, and other miscellaneous elements, please refer the initial infectious diseases consultation and/or the admitting history and physical examination for this admission.    ROS:  CONSTITUTIONAL:  Negative fever or chills, feels well, good appetite  EYES:  Negative  blurry vision or double vision  CARDIOVASCULAR:  Negative for chest pain or palpitations  RESPIRATORY:  Negative for cough, wheezing, or SOB   GASTROINTESTINAL:  Negative for nausea, vomiting, diarrhea, constipation, or abdominal pain  GENITOURINARY:  Negative frequency, urgency or dysuria  NEUROLOGIC:  No headache, confusion, dizziness, lightheadedness  All other systems were reviewed and are negative     ALLERGIES:  codeine (Anaphylaxis)      Current inpatient medications :    ANTIBIOTICS/RELEVANT:  ciprofloxacin     Tablet 500 milliGRAM(s) Oral every 12 hours  metroNIDAZOLE    Tablet 500 milliGRAM(s) Oral every 8 hours    MEDICATIONS  (STANDING):  dextrose 5%. 1000 milliLiter(s) (50 mL/Hr) IV Continuous <Continuous>  dextrose 50% Injectable 12.5 Gram(s) IV Push once  dextrose 50% Injectable 25 Gram(s) IV Push once  dextrose 50% Injectable 25 Gram(s) IV Push once  enoxaparin Injectable 40 milliGRAM(s) SubCutaneous daily  gabapentin 800 milliGRAM(s) Oral four times a day  insulin glargine Injectable (LANTUS) 50 Unit(s) SubCutaneous at bedtime  insulin lispro (HumaLOG) corrective regimen sliding scale   SubCutaneous three times a day before meals  insulin lispro (HumaLOG) corrective regimen sliding scale   SubCutaneous at bedtime  insulin lispro Injectable (HumaLOG) 10 Unit(s) SubCutaneous three times a day before meals  lactulose Syrup 30 Gram(s) Oral four times a day  lactulose Syrup 20 Gram(s) Oral once  losartan 100 milliGRAM(s) Oral daily  pantoprazole    Tablet 40 milliGRAM(s) Oral two times a day  rifaximin 550 milliGRAM(s) Oral two times a day  sucralfate 1 Gram(s) Oral every 6 hours  ursodiol Capsule 300 milliGRAM(s) Oral three times a day    MEDICATIONS  (PRN):  dextrose 40% Gel 15 Gram(s) Oral once PRN Blood Glucose LESS THAN 70 milliGRAM(s)/deciliter  glucagon  Injectable 1 milliGRAM(s) IntraMuscular once PRN Glucose LESS THAN 70 milligrams/deciliter      Objective:    T(C): 36.4 (10-22-18 @ 12:55), Max: 36.6 (10-22-18 @ 04:45)  HR: 86 (10-22-18 @ 12:55) (81 - 86)  BP: 132/78 (10-22-18 @ 12:55) (121/70 - 132/78)  RR: 17 (10-22-18 @ 12:55) (17 - 18)  SpO2: 99% (10-22-18 @ 12:55) (96% - 99%)  Wt(kg): --      Physical Exam:  General: no acute distress  Eyes: sclera anicteric, pupils equal and reactive to light  ENMT: buccal mucosa moist, pharynx not injected  Neck: supple, trachea midline  Lungs: clear, no wheeze/rhonchi  Cardiovascular: regular rate and rhythm, S1 S2  Abdomen: soft, nontender, no organomegaly present, bowel sounds normal  Neurological: alert and oriented x3, Cranial Nerves II-XII grossly intact  Skin: no increased ecchymosis/petechiae/purpura  Lymph Nodes: no palpable cervical/supraclavicular lymph nodes enlargements  Extremities: no cyanosis/clubbing/edema      LABS:                        12.0   4.12  )-----------( 87       ( 23 Oct 2018 08:15 )             35.0   10-23    140  |  108  |  8   ----------------------------<  162<H>  3.8   |  26  |  0.81    Ca    8.5      23 Oct 2018 08:15    TPro  5.8<L>  /  Alb  2.3<L>  /  TBili  2.9<H>  /  DBili  x   /  AST  37  /  ALT  27  /  AlkPhos  147<H>  10-23      MICROBIOLOGY:  Culture - Blood (10.19.18 @ 10:42)    Specimen Source: .Blood Blood    Culture Results:   No growth to date.    Culture - Blood (10.17.18 @ 18:43)    Gram Stain:   Growth in anaerobic bottle: Gram Variable Rods    Specimen Source: .Blood Blood    Culture Results:   Growth in anaerobic bottle: Gram Variable Rods Sent to  [facility]   for Identification  Assessment :    Pt is a 61 YO M with PMHx obesity, DM 2, HTN, HLD, nephrolithiasis, neuropathy, poss JEAN  admitted with CC of 2 day hx of abdominal pain and vomiting found to have duodenitis, PUD  sp EGD which showed non bleeding esophageal ulcers, esophagitis, non bleeding duodenal   ulcers now with blood cultures from admission growing single set GVR.   Pt remained afebrile and wbc wnl.  Nontoxic  Bacteremia with GVR ?GNR ?contaminant Micro   Still no ID of GVR yet- micro lab sent to [facility]  Pt doing well    Plan :   Dc Zosyn  Dc pt home on Cipro Flagyl x 5 days  Stable from ID standpoint    D/w DR RAMY Arreguin    Continue with present regiment.  Appropriate use of antibiotics and adverse effects reviewed.      I have discussed the above plan of care with patient/ family in detail. They expressed understanding of the  treatment plan . Risks, benefits and alternatives discussed in detail. I have asked if they have any questions or concerns and appropriately addressed them to the best of my ability .    > 35 minutes were spent in direct patient care reviewing notes, medications ,labs data/ imaging , discussion with multidisciplinary team.    Thank you for allowing me to participate in care of your patient .    Tanya Rios MD  Infectious Disease  [phone]

## 2018-10-23 NOTE — PROGRESS NOTE ADULT - PROBLEM SELECTOR PLAN 2
-Resolved. S/p EGD - showed non bleeding esophageal ulcers, esophagitis, non bleeding duodenal ulcers, no varices   -Pain  2/2 PUD /gastritis pt states he takes 800mg ibuprofen every other night for neuropathic pain.  - CT Abd/Pelvis showing diffuse bowel wall thickening involving the post bulbar duodenum, descending portion of the duodenum, with danilo duodenal stranding. Findings may be associated with duodenitis or peptic ulcer disease. No bowel obstruction is noted.  - GI (Dr. Morales/[facility]): ppi bid/carafate q6, f/u bxs,   - DASH/TLC  diet. Protonix 40mg BID . Carafate 4x day  - ECG showing mild Qtc prolongation on admission. Hold Zofran.  - Avoid QT prolonging meds.  -H/H stable

## 2018-10-23 NOTE — PROGRESS NOTE ADULT - PROBLEM SELECTOR PLAN 1
pain resolved  CT showed duodenitis and/or peptic ulcer disease  sp egd showed non bleeding esophageal ulcers, esophagitis, non bleeding duodenal ulcers, no EV/GV  ppi bid/carafate q6  trend h/h, transfuse prn  diet as tolerated  monitor abd exam  will need close outpatient gi f/u upon dc, appt made with Dr Morales at  [facility]astro for Friday 10/26 at 1130am  dc planning per primary team

## 2018-10-23 NOTE — PROGRESS NOTE ADULT - PROBLEM SELECTOR PLAN 3
- chronic, stable   -elevated Bili -?? Etiology  - pt follows GI (Dr. Morales) outpt.  -cont ppi, sandra, rifaximin bid  cont lactulose qid, titrate for 3-4 soft bms/day  - Lasix resumed  -MRCP/ liver MRI unable to be performed 2/2 patient body habitus; will follow up GI outpatient on d/c - chronic, stable   -elevated Bili -?? Etiology  - pt follows GI (Dr. Morales) outpt. MRI Liver with Cont/MRCP  -cont ppi, sandra, rifaximin bid ,loose bms/day  - Lasix resumed  -MRCP/ liver MRI unable to be performed 2/2 patient body habitus; will follow up GI outpatient on d/c this week Friday at 11:30 AM  NO Tylenol d/w pt

## 2018-10-23 NOTE — PROGRESS NOTE ADULT - ASSESSMENT
Pt is a 61 y/o M with PMHx of Type 2 DM, HTN, HLD, obesity, nephrolithiasis (last stone 25 years ago), neuropathy, HE, NAFLD, who presents with abdominal pain and vomiting for the past two days, admitted for PUD with EGD done, Duodenal ulcers, No Bleeding , Blood cultures + Gram Variable rods, On IV Zosyn - per ID switch to PO cipro, flagyl today.

## 2018-10-23 NOTE — PROGRESS NOTE ADULT - PROBLEM SELECTOR PLAN 4
Uncontrolled  A1c 10.2- holding home medications: pioglitazone, metformin, toujeo,   - Endocrine Dr. C Perlman: cont Lantus 50 units daily  cont Humalog 10 units tid before meals plus scale coverage  mod dose humalog scale coverage Uncontrolled  A1c 10.2- holding home medications: pioglitazone, metformin on D/C , Restart  toujeo, Q HS    - Endocrine Dr. C Perlman: cont Lantus 50 units daily  cont Humalog 10 units tid before meals plus scale coverage  mod dose Humalog scale coverage  PAT Weil saw pt

## 2018-10-23 NOTE — PROGRESS NOTE ADULT - PROBLEM SELECTOR PLAN 7
- chronic, Unclear Etiology stable, High Bili   - GI following Dr Morales D/W for MRI -Liver with IV Contrast, MRCP NON  Con as out pt .UNABLE TO BE PERFORMED 2/2 BODY HABITUS.   -, cont ppi, sandra, rifaximin bid  cont lactulose qid, titrate for 3-4 soft bms/day - chronic, Unclear Etiology stable, High Bili   - GI following Dr Morales D/W for MRI -Liver with IV Contrast, MRCP NON  Con as out pt .UNABLE TO BE PERFORMED 2/2 BODY HABITUS.   -, cont ppi, sandra, rifaximin bid  cont lactulose qid, titrate for 3-4 loose bms/day

## 2018-10-26 ENCOUNTER — OUTPATIENT (OUTPATIENT)
Dept: OUTPATIENT SERVICES | Facility: HOSPITAL | Age: 63
LOS: 1 days | End: 2018-10-26
Payer: MEDICARE

## 2018-10-26 DIAGNOSIS — R10.11 RIGHT UPPER QUADRANT PAIN: ICD-10-CM

## 2018-10-26 DIAGNOSIS — Z90.49 ACQUIRED ABSENCE OF OTHER SPECIFIED PARTS OF DIGESTIVE TRACT: Chronic | ICD-10-CM

## 2018-10-26 PROBLEM — K72.90 HEPATIC FAILURE, UNSPECIFIED WITHOUT COMA: Chronic | Status: ACTIVE | Noted: 2018-10-17

## 2018-10-26 LAB
ALBUMIN SERPL ELPH-MCNC: 2.6 G/DL — LOW (ref 3.3–5)
ALP SERPL-CCNC: 268 U/L — HIGH (ref 30–120)
ALT FLD-CCNC: 32 U/L DA — SIGNIFICANT CHANGE UP (ref 10–60)
AMMONIA BLD-MCNC: 161 UMOL/L — HIGH (ref 11–32)
ANION GAP SERPL CALC-SCNC: 7 MMOL/L — SIGNIFICANT CHANGE UP (ref 5–17)
AST SERPL-CCNC: 40 U/L — SIGNIFICANT CHANGE UP (ref 10–40)
BASOPHILS # BLD AUTO: 0.05 K/UL — SIGNIFICANT CHANGE UP (ref 0–0.2)
BASOPHILS NFR BLD AUTO: 1.4 % — SIGNIFICANT CHANGE UP (ref 0–2)
BILIRUB DIRECT SERPL-MCNC: 1.2 MG/DL — HIGH (ref 0–0.2)
BILIRUB INDIRECT FLD-MCNC: 1.5 MG/DL — HIGH (ref 0.2–1)
BILIRUB SERPL-MCNC: 2.7 MG/DL — HIGH (ref 0.2–1.2)
BUN SERPL-MCNC: 11 MG/DL — SIGNIFICANT CHANGE UP (ref 7–23)
CALCIUM SERPL-MCNC: 8.8 MG/DL — SIGNIFICANT CHANGE UP (ref 8.4–10.5)
CHLORIDE SERPL-SCNC: 106 MMOL/L — SIGNIFICANT CHANGE UP (ref 96–108)
CO2 SERPL-SCNC: 27 MMOL/L — SIGNIFICANT CHANGE UP (ref 22–31)
CREAT SERPL-MCNC: 0.93 MG/DL — SIGNIFICANT CHANGE UP (ref 0.5–1.3)
EOSINOPHIL # BLD AUTO: 0.12 K/UL — SIGNIFICANT CHANGE UP (ref 0–0.5)
EOSINOPHIL NFR BLD AUTO: 3.3 % — SIGNIFICANT CHANGE UP (ref 0–6)
FERRITIN SERPL-MCNC: 96 NG/ML — SIGNIFICANT CHANGE UP (ref 30–400)
FOLATE SERPL-MCNC: 9.8 NG/ML — SIGNIFICANT CHANGE UP
GGT SERPL-CCNC: 45 U/L — SIGNIFICANT CHANGE UP (ref 9–50)
GLUCOSE SERPL-MCNC: 223 MG/DL — HIGH (ref 70–99)
HBA1C BLD-MCNC: 9.7 % — HIGH (ref 4–5.6)
HCT VFR BLD CALC: 36.9 % — LOW (ref 39–50)
HGB BLD-MCNC: 12.5 G/DL — LOW (ref 13–17)
IMM GRANULOCYTES NFR BLD AUTO: 0 % — SIGNIFICANT CHANGE UP (ref 0–1.5)
INR BLD: 1.43 RATIO — HIGH (ref 0.88–1.16)
IRON SATN MFR SERPL: 124 UG/DL — SIGNIFICANT CHANGE UP (ref 45–165)
LYMPHOCYTES # BLD AUTO: 1.38 K/UL — SIGNIFICANT CHANGE UP (ref 1–3.3)
LYMPHOCYTES # BLD AUTO: 37.5 % — SIGNIFICANT CHANGE UP (ref 13–44)
MCHC RBC-ENTMCNC: 33.9 GM/DL — SIGNIFICANT CHANGE UP (ref 32–36)
MCHC RBC-ENTMCNC: 34.2 PG — HIGH (ref 27–34)
MCV RBC AUTO: 101.1 FL — HIGH (ref 80–100)
MONOCYTES # BLD AUTO: 0.46 K/UL — SIGNIFICANT CHANGE UP (ref 0–0.9)
MONOCYTES NFR BLD AUTO: 12.5 % — SIGNIFICANT CHANGE UP (ref 2–14)
NEUTROPHILS # BLD AUTO: 1.67 K/UL — LOW (ref 1.8–7.4)
NEUTROPHILS NFR BLD AUTO: 45.3 % — SIGNIFICANT CHANGE UP (ref 43–77)
NRBC # BLD: 0 /100 WBCS — SIGNIFICANT CHANGE UP (ref 0–0)
PLATELET # BLD AUTO: 68 K/UL — LOW (ref 150–400)
POTASSIUM SERPL-MCNC: 4.6 MMOL/L — SIGNIFICANT CHANGE UP (ref 3.5–5.3)
POTASSIUM SERPL-SCNC: 4.6 MMOL/L — SIGNIFICANT CHANGE UP (ref 3.5–5.3)
PROT SERPL-MCNC: 6.4 G/DL — SIGNIFICANT CHANGE UP (ref 6–8.3)
PROTHROM AB SERPL-ACNC: 15.7 SEC — HIGH (ref 9.8–12.7)
RBC # BLD: 3.65 M/UL — LOW (ref 4.2–5.8)
RBC # FLD: 14.5 % — SIGNIFICANT CHANGE UP (ref 10.3–14.5)
SODIUM SERPL-SCNC: 140 MMOL/L — SIGNIFICANT CHANGE UP (ref 135–145)
WBC # BLD: 3.68 K/UL — LOW (ref 3.8–10.5)
WBC # FLD AUTO: 3.68 K/UL — LOW (ref 3.8–10.5)

## 2018-10-26 PROCEDURE — 82977 ASSAY OF GGT: CPT

## 2018-10-26 PROCEDURE — 82140 ASSAY OF AMMONIA: CPT

## 2018-10-26 PROCEDURE — 85610 PROTHROMBIN TIME: CPT

## 2018-10-26 PROCEDURE — 80076 HEPATIC FUNCTION PANEL: CPT

## 2018-10-26 PROCEDURE — 36415 COLL VENOUS BLD VENIPUNCTURE: CPT

## 2018-10-26 PROCEDURE — 83540 ASSAY OF IRON: CPT

## 2018-10-26 PROCEDURE — 82728 ASSAY OF FERRITIN: CPT

## 2018-10-26 PROCEDURE — 83036 HEMOGLOBIN GLYCOSYLATED A1C: CPT

## 2018-10-26 PROCEDURE — 80048 BASIC METABOLIC PNL TOTAL CA: CPT

## 2018-10-26 PROCEDURE — 82746 ASSAY OF FOLIC ACID SERUM: CPT

## 2018-10-26 PROCEDURE — 85027 COMPLETE CBC AUTOMATED: CPT

## 2018-11-11 PROCEDURE — 87040 BLOOD CULTURE FOR BACTERIA: CPT

## 2018-11-11 PROCEDURE — 85027 COMPLETE CBC AUTOMATED: CPT

## 2018-11-11 PROCEDURE — 82962 GLUCOSE BLOOD TEST: CPT

## 2018-11-11 PROCEDURE — 96376 TX/PRO/DX INJ SAME DRUG ADON: CPT

## 2018-11-11 PROCEDURE — 84540 ASSAY OF URINE/UREA-N: CPT

## 2018-11-11 PROCEDURE — 84300 ASSAY OF URINE SODIUM: CPT

## 2018-11-11 PROCEDURE — 83036 HEMOGLOBIN GLYCOSYLATED A1C: CPT

## 2018-11-11 PROCEDURE — 83935 ASSAY OF URINE OSMOLALITY: CPT

## 2018-11-11 PROCEDURE — 86900 BLOOD TYPING SEROLOGIC ABO: CPT

## 2018-11-11 PROCEDURE — 80076 HEPATIC FUNCTION PANEL: CPT

## 2018-11-11 PROCEDURE — 86850 RBC ANTIBODY SCREEN: CPT

## 2018-11-11 PROCEDURE — 93005 ELECTROCARDIOGRAM TRACING: CPT

## 2018-11-11 PROCEDURE — 83690 ASSAY OF LIPASE: CPT

## 2018-11-11 PROCEDURE — 96374 THER/PROPH/DIAG INJ IV PUSH: CPT | Mod: XU

## 2018-11-11 PROCEDURE — 86901 BLOOD TYPING SEROLOGIC RH(D): CPT

## 2018-11-11 PROCEDURE — 84560 ASSAY OF URINE/URIC ACID: CPT

## 2018-11-11 PROCEDURE — 85610 PROTHROMBIN TIME: CPT

## 2018-11-11 PROCEDURE — 88312 SPECIAL STAINS GROUP 1: CPT

## 2018-11-11 PROCEDURE — 74177 CT ABD & PELVIS W/CONTRAST: CPT

## 2018-11-11 PROCEDURE — 84156 ASSAY OF PROTEIN URINE: CPT

## 2018-11-11 PROCEDURE — 88305 TISSUE EXAM BY PATHOLOGIST: CPT

## 2018-11-11 PROCEDURE — 83605 ASSAY OF LACTIC ACID: CPT

## 2018-11-11 PROCEDURE — 85730 THROMBOPLASTIN TIME PARTIAL: CPT

## 2018-11-11 PROCEDURE — 80048 BASIC METABOLIC PNL TOTAL CA: CPT

## 2018-11-11 PROCEDURE — 82043 UR ALBUMIN QUANTITATIVE: CPT

## 2018-11-11 PROCEDURE — 82140 ASSAY OF AMMONIA: CPT

## 2018-11-11 PROCEDURE — 71045 X-RAY EXAM CHEST 1 VIEW: CPT

## 2018-11-11 PROCEDURE — 82105 ALPHA-FETOPROTEIN SERUM: CPT

## 2018-11-11 PROCEDURE — 99285 EMERGENCY DEPT VISIT HI MDM: CPT | Mod: 25

## 2018-11-11 PROCEDURE — 36415 COLL VENOUS BLD VENIPUNCTURE: CPT

## 2018-11-11 PROCEDURE — 80053 COMPREHEN METABOLIC PANEL: CPT

## 2018-11-11 PROCEDURE — 81001 URINALYSIS AUTO W/SCOPE: CPT

## 2018-11-11 PROCEDURE — 88313 SPECIAL STAINS GROUP 2: CPT

## 2018-11-11 PROCEDURE — 84133 ASSAY OF URINE POTASSIUM: CPT

## 2018-11-11 PROCEDURE — 82150 ASSAY OF AMYLASE: CPT

## 2018-11-28 LAB — CULTURE RESULTS: SIGNIFICANT CHANGE UP

## 2018-11-29 NOTE — ED ADULT NURSE NOTE - NSHISCREENINGQ1_ED_A_ED
Anesthesia reports 150mg Propofol, and 400mL NS given during procedure. Received report from anesthesia staff on vital signs and status of patient. No

## 2018-11-30 ENCOUNTER — INPATIENT (INPATIENT)
Facility: HOSPITAL | Age: 63
LOS: 10 days | Discharge: HOME | DRG: 377 | End: 2018-12-11
Attending: INTERNAL MEDICINE | Admitting: INTERNAL MEDICINE
Payer: MEDICARE

## 2018-11-30 VITALS
RESPIRATION RATE: 18 BRPM | TEMPERATURE: 97 F | OXYGEN SATURATION: 100 % | DIASTOLIC BLOOD PRESSURE: 59 MMHG | HEIGHT: 75 IN | WEIGHT: 304.9 LBS | SYSTOLIC BLOOD PRESSURE: 85 MMHG | HEART RATE: 86 BPM

## 2018-11-30 DIAGNOSIS — Z90.49 ACQUIRED ABSENCE OF OTHER SPECIFIED PARTS OF DIGESTIVE TRACT: Chronic | ICD-10-CM

## 2018-11-30 DIAGNOSIS — E78.00 PURE HYPERCHOLESTEROLEMIA, UNSPECIFIED: ICD-10-CM

## 2018-11-30 DIAGNOSIS — Z29.9 ENCOUNTER FOR PROPHYLACTIC MEASURES, UNSPECIFIED: ICD-10-CM

## 2018-11-30 DIAGNOSIS — R79.89 OTHER SPECIFIED ABNORMAL FINDINGS OF BLOOD CHEMISTRY: ICD-10-CM

## 2018-11-30 DIAGNOSIS — I10 ESSENTIAL (PRIMARY) HYPERTENSION: ICD-10-CM

## 2018-11-30 DIAGNOSIS — K72.90 HEPATIC FAILURE, UNSPECIFIED WITHOUT COMA: ICD-10-CM

## 2018-11-30 DIAGNOSIS — K76.0 FATTY (CHANGE OF) LIVER, NOT ELSEWHERE CLASSIFIED: ICD-10-CM

## 2018-11-30 DIAGNOSIS — R60.0 LOCALIZED EDEMA: ICD-10-CM

## 2018-11-30 DIAGNOSIS — R55 SYNCOPE AND COLLAPSE: ICD-10-CM

## 2018-11-30 DIAGNOSIS — E11.9 TYPE 2 DIABETES MELLITUS WITHOUT COMPLICATIONS: ICD-10-CM

## 2018-11-30 DIAGNOSIS — R11.10 VOMITING, UNSPECIFIED: ICD-10-CM

## 2018-11-30 DIAGNOSIS — G62.9 POLYNEUROPATHY, UNSPECIFIED: ICD-10-CM

## 2018-11-30 LAB
ALBUMIN SERPL ELPH-MCNC: 2.3 G/DL — LOW (ref 3.3–5)
ALP SERPL-CCNC: 183 U/L — HIGH (ref 40–120)
ALT FLD-CCNC: 20 U/L — SIGNIFICANT CHANGE UP (ref 12–78)
AMMONIA BLD-MCNC: 72 UMOL/L — HIGH (ref 11–32)
ANION GAP SERPL CALC-SCNC: 11 MMOL/L — SIGNIFICANT CHANGE UP (ref 5–17)
APPEARANCE UR: ABNORMAL
APTT BLD: 33.1 SEC — SIGNIFICANT CHANGE UP (ref 28.5–37)
AST SERPL-CCNC: 30 U/L — SIGNIFICANT CHANGE UP (ref 15–37)
BACTERIA # UR AUTO: ABNORMAL
BASOPHILS # BLD AUTO: 0.08 K/UL — SIGNIFICANT CHANGE UP (ref 0–0.2)
BASOPHILS NFR BLD AUTO: 1.1 % — SIGNIFICANT CHANGE UP (ref 0–2)
BILIRUB SERPL-MCNC: 3.7 MG/DL — HIGH (ref 0.2–1.2)
BILIRUB UR-MCNC: ABNORMAL
BUN SERPL-MCNC: 22 MG/DL — SIGNIFICANT CHANGE UP (ref 7–23)
CALCIUM SERPL-MCNC: 8.3 MG/DL — LOW (ref 8.5–10.1)
CALCIUM SERPL-MCNC: 8.3 MG/DL — LOW (ref 8.5–10.1)
CHLORIDE SERPL-SCNC: 104 MMOL/L — SIGNIFICANT CHANGE UP (ref 96–108)
CO2 SERPL-SCNC: 25 MMOL/L — SIGNIFICANT CHANGE UP (ref 22–31)
COLOR SPEC: YELLOW — SIGNIFICANT CHANGE UP
CREAT SERPL-MCNC: 1.2 MG/DL — SIGNIFICANT CHANGE UP (ref 0.5–1.3)
DIFF PNL FLD: ABNORMAL
EOSINOPHIL # BLD AUTO: 0.11 K/UL — SIGNIFICANT CHANGE UP (ref 0–0.5)
EOSINOPHIL NFR BLD AUTO: 1.5 % — SIGNIFICANT CHANGE UP (ref 0–6)
EPI CELLS # UR: SIGNIFICANT CHANGE UP
GLUCOSE SERPL-MCNC: 226 MG/DL — HIGH (ref 70–99)
GLUCOSE UR QL: 1000 MG/DL
HCT VFR BLD CALC: 30.8 % — LOW (ref 39–50)
HGB BLD-MCNC: 10.4 G/DL — LOW (ref 13–17)
HYALINE CASTS # UR AUTO: ABNORMAL /LPF
IMM GRANULOCYTES NFR BLD AUTO: 0.6 % — SIGNIFICANT CHANGE UP (ref 0–1.5)
INR BLD: 1.69 RATIO — HIGH (ref 0.88–1.16)
KETONES UR-MCNC: ABNORMAL
LACTATE SERPL-SCNC: 4 MMOL/L — CRITICAL HIGH (ref 0.7–2)
LACTATE SERPL-SCNC: 5.6 MMOL/L — CRITICAL HIGH (ref 0.7–2)
LEUKOCYTE ESTERASE UR-ACNC: ABNORMAL
LIDOCAIN IGE QN: 70 U/L — LOW (ref 73–393)
LYMPHOCYTES # BLD AUTO: 2.52 K/UL — SIGNIFICANT CHANGE UP (ref 1–3.3)
LYMPHOCYTES # BLD AUTO: 35.1 % — SIGNIFICANT CHANGE UP (ref 13–44)
MAGNESIUM SERPL-MCNC: 1.4 MG/DL — LOW (ref 1.6–2.6)
MCHC RBC-ENTMCNC: 33.8 GM/DL — SIGNIFICANT CHANGE UP (ref 32–36)
MCHC RBC-ENTMCNC: 34.2 PG — HIGH (ref 27–34)
MCV RBC AUTO: 101.3 FL — HIGH (ref 80–100)
MONOCYTES # BLD AUTO: 0.66 K/UL — SIGNIFICANT CHANGE UP (ref 0–0.9)
MONOCYTES NFR BLD AUTO: 9.2 % — SIGNIFICANT CHANGE UP (ref 2–14)
NEUTROPHILS # BLD AUTO: 3.76 K/UL — SIGNIFICANT CHANGE UP (ref 1.8–7.4)
NEUTROPHILS NFR BLD AUTO: 52.5 % — SIGNIFICANT CHANGE UP (ref 43–77)
NITRITE UR-MCNC: NEGATIVE — SIGNIFICANT CHANGE UP
NRBC # BLD: 0 /100 WBCS — SIGNIFICANT CHANGE UP (ref 0–0)
PH UR: 7 — SIGNIFICANT CHANGE UP (ref 5–8)
PHOSPHATE SERPL-MCNC: 3.1 MG/DL — SIGNIFICANT CHANGE UP (ref 2.5–4.5)
PLATELET # BLD AUTO: 109 K/UL — LOW (ref 150–400)
POTASSIUM SERPL-MCNC: 4.1 MMOL/L — SIGNIFICANT CHANGE UP (ref 3.5–5.3)
POTASSIUM SERPL-SCNC: 4.1 MMOL/L — SIGNIFICANT CHANGE UP (ref 3.5–5.3)
PROT SERPL-MCNC: 5.6 G/DL — LOW (ref 6–8.3)
PROT UR-MCNC: 25 MG/DL
PROTHROM AB SERPL-ACNC: 19.6 SEC — HIGH (ref 10–12.9)
RAPID RVP RESULT: SIGNIFICANT CHANGE UP
RBC # BLD: 3.04 M/UL — LOW (ref 4.2–5.8)
RBC # FLD: 15 % — HIGH (ref 10.3–14.5)
RBC CASTS # UR COMP ASSIST: ABNORMAL /HPF (ref 0–4)
SODIUM SERPL-SCNC: 140 MMOL/L — SIGNIFICANT CHANGE UP (ref 135–145)
SP GR SPEC: 1.01 — SIGNIFICANT CHANGE UP (ref 1.01–1.02)
TROPONIN I SERPL-MCNC: 0.02 NG/ML — SIGNIFICANT CHANGE UP (ref 0.01–0.04)
UROBILINOGEN FLD QL: NEGATIVE — SIGNIFICANT CHANGE UP
WBC # BLD: 7.17 K/UL — SIGNIFICANT CHANGE UP (ref 3.8–10.5)
WBC # FLD AUTO: 7.17 K/UL — SIGNIFICANT CHANGE UP (ref 3.8–10.5)
WBC UR QL: SIGNIFICANT CHANGE UP

## 2018-11-30 PROCEDURE — 71045 X-RAY EXAM CHEST 1 VIEW: CPT | Mod: 26

## 2018-11-30 PROCEDURE — 72110 X-RAY EXAM L-2 SPINE 4/>VWS: CPT | Mod: 26

## 2018-11-30 PROCEDURE — 74018 RADEX ABDOMEN 1 VIEW: CPT | Mod: 26

## 2018-11-30 PROCEDURE — 93010 ELECTROCARDIOGRAM REPORT: CPT

## 2018-11-30 PROCEDURE — 72125 CT NECK SPINE W/O DYE: CPT | Mod: 26

## 2018-11-30 PROCEDURE — 99285 EMERGENCY DEPT VISIT HI MDM: CPT

## 2018-11-30 PROCEDURE — 70450 CT HEAD/BRAIN W/O DYE: CPT | Mod: 26

## 2018-11-30 RX ORDER — SUCRALFATE 1 G
1 TABLET ORAL ONCE
Qty: 0 | Refills: 0 | Status: COMPLETED | OUTPATIENT
Start: 2018-11-30 | End: 2018-11-30

## 2018-11-30 RX ORDER — SODIUM CHLORIDE 9 MG/ML
1000 INJECTION INTRAMUSCULAR; INTRAVENOUS; SUBCUTANEOUS ONCE
Qty: 0 | Refills: 0 | Status: COMPLETED | OUTPATIENT
Start: 2018-11-30 | End: 2018-11-30

## 2018-11-30 RX ORDER — PANTOPRAZOLE SODIUM 20 MG/1
40 TABLET, DELAYED RELEASE ORAL
Qty: 0 | Refills: 0 | Status: DISCONTINUED | OUTPATIENT
Start: 2018-11-30 | End: 2018-12-01

## 2018-11-30 RX ORDER — DEXTROSE 50 % IN WATER 50 %
15 SYRINGE (ML) INTRAVENOUS ONCE
Qty: 0 | Refills: 0 | Status: DISCONTINUED | OUTPATIENT
Start: 2018-11-30 | End: 2018-12-02

## 2018-11-30 RX ORDER — GABAPENTIN 400 MG/1
800 CAPSULE ORAL
Qty: 0 | Refills: 0 | Status: DISCONTINUED | OUTPATIENT
Start: 2018-11-30 | End: 2018-12-02

## 2018-11-30 RX ORDER — SUCRALFATE 1 G
1 TABLET ORAL EVERY 6 HOURS
Qty: 0 | Refills: 0 | Status: DISCONTINUED | OUTPATIENT
Start: 2018-11-30 | End: 2018-12-11

## 2018-11-30 RX ORDER — SODIUM CHLORIDE 9 MG/ML
1000 INJECTION INTRAMUSCULAR; INTRAVENOUS; SUBCUTANEOUS
Qty: 0 | Refills: 0 | Status: DISCONTINUED | OUTPATIENT
Start: 2018-11-30 | End: 2018-12-01

## 2018-11-30 RX ORDER — ONDANSETRON 8 MG/1
4 TABLET, FILM COATED ORAL ONCE
Qty: 0 | Refills: 0 | Status: COMPLETED | OUTPATIENT
Start: 2018-11-30 | End: 2018-11-30

## 2018-11-30 RX ORDER — GLUCAGON INJECTION, SOLUTION 0.5 MG/.1ML
1 INJECTION, SOLUTION SUBCUTANEOUS ONCE
Qty: 0 | Refills: 0 | Status: DISCONTINUED | OUTPATIENT
Start: 2018-11-30 | End: 2018-12-02

## 2018-11-30 RX ORDER — URSODIOL 250 MG/1
300 TABLET, FILM COATED ORAL THREE TIMES A DAY
Qty: 0 | Refills: 0 | Status: DISCONTINUED | OUTPATIENT
Start: 2018-11-30 | End: 2018-12-11

## 2018-11-30 RX ORDER — FAMOTIDINE 10 MG/ML
20 INJECTION INTRAVENOUS DAILY
Qty: 0 | Refills: 0 | Status: DISCONTINUED | OUTPATIENT
Start: 2018-11-30 | End: 2018-12-01

## 2018-11-30 RX ORDER — SODIUM CHLORIDE 9 MG/ML
1000 INJECTION INTRAMUSCULAR; INTRAVENOUS; SUBCUTANEOUS ONCE
Qty: 0 | Refills: 0 | Status: COMPLETED | OUTPATIENT
Start: 2018-11-30 | End: 2018-12-01

## 2018-11-30 RX ORDER — DEXTROSE 50 % IN WATER 50 %
12.5 SYRINGE (ML) INTRAVENOUS ONCE
Qty: 0 | Refills: 0 | Status: DISCONTINUED | OUTPATIENT
Start: 2018-11-30 | End: 2018-12-02

## 2018-11-30 RX ORDER — DEXTROSE 50 % IN WATER 50 %
25 SYRINGE (ML) INTRAVENOUS ONCE
Qty: 0 | Refills: 0 | Status: DISCONTINUED | OUTPATIENT
Start: 2018-11-30 | End: 2018-12-02

## 2018-11-30 RX ORDER — PANTOPRAZOLE SODIUM 20 MG/1
40 TABLET, DELAYED RELEASE ORAL ONCE
Qty: 0 | Refills: 0 | Status: COMPLETED | OUTPATIENT
Start: 2018-11-30 | End: 2018-11-30

## 2018-11-30 RX ORDER — INSULIN GLARGINE 100 [IU]/ML
30 INJECTION, SOLUTION SUBCUTANEOUS AT BEDTIME
Qty: 0 | Refills: 0 | Status: DISCONTINUED | OUTPATIENT
Start: 2018-11-30 | End: 2018-12-01

## 2018-11-30 RX ORDER — ENOXAPARIN SODIUM 100 MG/ML
40 INJECTION SUBCUTANEOUS EVERY 24 HOURS
Qty: 0 | Refills: 0 | Status: DISCONTINUED | OUTPATIENT
Start: 2018-11-30 | End: 2018-12-01

## 2018-11-30 RX ORDER — INSULIN LISPRO 100/ML
VIAL (ML) SUBCUTANEOUS
Qty: 0 | Refills: 0 | Status: DISCONTINUED | OUTPATIENT
Start: 2018-11-30 | End: 2018-12-01

## 2018-11-30 RX ORDER — SODIUM CHLORIDE 9 MG/ML
1000 INJECTION INTRAMUSCULAR; INTRAVENOUS; SUBCUTANEOUS
Qty: 0 | Refills: 0 | Status: COMPLETED | OUTPATIENT
Start: 2018-11-30 | End: 2018-11-30

## 2018-11-30 RX ORDER — ONDANSETRON 8 MG/1
4 TABLET, FILM COATED ORAL EVERY 6 HOURS
Qty: 0 | Refills: 0 | Status: DISCONTINUED | OUTPATIENT
Start: 2018-11-30 | End: 2018-12-01

## 2018-11-30 RX ORDER — SODIUM CHLORIDE 9 MG/ML
1000 INJECTION INTRAMUSCULAR; INTRAVENOUS; SUBCUTANEOUS ONCE
Qty: 0 | Refills: 0 | Status: DISCONTINUED | OUTPATIENT
Start: 2018-11-30 | End: 2018-11-30

## 2018-11-30 RX ORDER — SODIUM CHLORIDE 9 MG/ML
1000 INJECTION, SOLUTION INTRAVENOUS
Qty: 0 | Refills: 0 | Status: DISCONTINUED | OUTPATIENT
Start: 2018-11-30 | End: 2018-12-02

## 2018-11-30 RX ORDER — ATORVASTATIN CALCIUM 80 MG/1
10 TABLET, FILM COATED ORAL AT BEDTIME
Qty: 0 | Refills: 0 | Status: DISCONTINUED | OUTPATIENT
Start: 2018-11-30 | End: 2018-12-01

## 2018-11-30 RX ORDER — LACTULOSE 10 G/15ML
20 SOLUTION ORAL THREE TIMES A DAY
Qty: 0 | Refills: 0 | Status: DISCONTINUED | OUTPATIENT
Start: 2018-11-30 | End: 2018-12-01

## 2018-11-30 RX ADMIN — SODIUM CHLORIDE 1000 MILLILITER(S): 9 INJECTION INTRAMUSCULAR; INTRAVENOUS; SUBCUTANEOUS at 17:39

## 2018-11-30 RX ADMIN — LACTULOSE 20 GRAM(S): 10 SOLUTION ORAL at 23:29

## 2018-11-30 RX ADMIN — SODIUM CHLORIDE 1000 MILLILITER(S): 9 INJECTION INTRAMUSCULAR; INTRAVENOUS; SUBCUTANEOUS at 17:40

## 2018-11-30 RX ADMIN — INSULIN GLARGINE 30 UNIT(S): 100 INJECTION, SOLUTION SUBCUTANEOUS at 23:52

## 2018-11-30 RX ADMIN — ATORVASTATIN CALCIUM 10 MILLIGRAM(S): 80 TABLET, FILM COATED ORAL at 22:07

## 2018-11-30 RX ADMIN — Medication 1 GRAM(S): at 23:27

## 2018-11-30 RX ADMIN — GABAPENTIN 800 MILLIGRAM(S): 400 CAPSULE ORAL at 23:28

## 2018-11-30 RX ADMIN — URSODIOL 300 MILLIGRAM(S): 250 TABLET, FILM COATED ORAL at 23:27

## 2018-11-30 RX ADMIN — SODIUM CHLORIDE 1000 MILLILITER(S): 9 INJECTION INTRAMUSCULAR; INTRAVENOUS; SUBCUTANEOUS at 16:57

## 2018-11-30 RX ADMIN — Medication 1 GRAM(S): at 19:40

## 2018-11-30 RX ADMIN — PANTOPRAZOLE SODIUM 40 MILLIGRAM(S): 20 TABLET, DELAYED RELEASE ORAL at 16:57

## 2018-11-30 RX ADMIN — SODIUM CHLORIDE 1000 MILLILITER(S): 9 INJECTION INTRAMUSCULAR; INTRAVENOUS; SUBCUTANEOUS at 22:15

## 2018-11-30 RX ADMIN — FAMOTIDINE 20 MILLIGRAM(S): 10 INJECTION INTRAVENOUS at 19:41

## 2018-11-30 RX ADMIN — ENOXAPARIN SODIUM 40 MILLIGRAM(S): 100 INJECTION SUBCUTANEOUS at 22:07

## 2018-11-30 RX ADMIN — SODIUM CHLORIDE 1000 MILLILITER(S): 9 INJECTION INTRAMUSCULAR; INTRAVENOUS; SUBCUTANEOUS at 18:37

## 2018-11-30 RX ADMIN — ONDANSETRON 4 MILLIGRAM(S): 8 TABLET, FILM COATED ORAL at 16:57

## 2018-11-30 RX ADMIN — ONDANSETRON 4 MILLIGRAM(S): 8 TABLET, FILM COATED ORAL at 23:57

## 2018-11-30 NOTE — H&P ADULT - ATTENDING COMMENTS
Pt seen, Examined, case & care plan d/w pt, residents at [facility].  D/W GI Dr Morales at [facility].  AM Labs

## 2018-11-30 NOTE — H&P ADULT - FAMILY HISTORY
Father  Still living? Unknown  Family history of stomach cancer, Age at diagnosis: Age Unknown  Family history of hypertension, Age at diagnosis: Age Unknown     Mother  Still living? Unknown  Family history of type 2 diabetes mellitus, Age at diagnosis: Age Unknown

## 2018-11-30 NOTE — H&P ADULT - PROBLEM SELECTOR PLAN 5
ammonia level 72 in ED; Pt not confused at baseline MS, no asterixis  on lactulose at home (not on rifaximin at home)  - c/w lactulose 30mL TID, titrate for soft BMs ammonia level 72 in ED; Pt not confused at baseline MS, no asterixis  on lactulose at home (not on rifaximin at home)  - c/w lactulose 30mL TID, titrate for soft BMs, rifaximin 550 BID  - f/u ammonia level in AM

## 2018-11-30 NOTE — H&P ADULT - RS GEN PE MLT RESP DETAILS PC
breath sounds equal/airway patent/respirations non-labored/clear to auscultation bilaterally no rales/normal/breath sounds equal/respirations non-labored/clear to auscultation bilaterally/airway patent/no rhonchi/no wheezes

## 2018-11-30 NOTE — H&P ADULT - PMH
Diabetes    Fatty liver disease, nonalcoholic    Hepatic encephalopathy    Hypercholesteremia    Hypertension    Neuropathy    Obesity    Renal stones  25 years ago Diabetes    Fatty liver disease, nonalcoholic    GERD with esophagitis  Gastritis & Non Bleeding Ulcers  Hepatic encephalopathy    Hypercholesteremia    Hypertension    Neuropathy    Obesity    Renal stones  25 years ago

## 2018-11-30 NOTE — H&P ADULT - PROBLEM SELECTOR PLAN 2
poss sec to dehydration vs pain, ? LOC  - CT Head neg   - fall risk precautions poss sec to dehydration vs pain, ? LOC  - CT Head neg   - EKG - normal sinus rhythm; 1st set troponins negative  - fall risk precautions poss sec to dehydration vs pain, ? LOC  - CT Head neg   - EKG - normal sinus rhythm; 1st set troponins negative; trend Brody  - consult Neuro (Mariana) appreciate recs  - consult cardio ([facility]), appreciate recs  - fall risk precautions ? etiology; hx of PUD, Esophagitis, Gastritis, non bleeding   - consult GI (Carmen), D/W at [facility].  - c/w Zofran q6hr, IV Protonix  BID, Carafate TID  - clear liquid diet, advance as tolerated  - IVF 60cc/hr NS

## 2018-11-30 NOTE — H&P ADULT - PROBLEM SELECTOR PLAN 6
in ED hypotensive  - hold BP meds for now  - on irbesartan 300mg QD at home, resume therapeutic ARB equivalent if BP becomes elevated -elevated Bili -?? Etiology  - pt follows GI (Dr. Morales) outpt.   -cont ppi, sandra, rifaximin bid ,loose bms/day -elevated Bili -?? Etiology  - pt follows GI (Dr. Morales) outpt.   -cont ppi, sandra, rifaximin bid ,loose bms/day  - f/u CMP in AM

## 2018-11-30 NOTE — ED PROVIDER NOTE - SKIN, MLM
Skin normal color for race, warm, dry and intact. No evidence of rash. no contusions or abrasion from fall
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
## 2018-11-30 NOTE — H&P ADULT - PROBLEM SELECTOR PLAN 10
on lasix 20mg QD and KlorCon 10mEq QD  - hold lasix in setting of hypotension, resume lasix and KlorCon if BP tolerates it on lasix 20mg QD and KlorCon 10mEq QD  - hold lasix in setting of hypotension, resume lasix and KlorCon if BP tolerates it    VTE ppx with lovenox on Lasix 20mg QD and KlorCon 10mEq QD  - hold Lasix in setting of hypotension, resume Lasix and KlorCon if BP tolerates it    VTE ppx with lovenox

## 2018-11-30 NOTE — H&P ADULT - NEUROLOGICAL DETAILS
alert and oriented x 3/responds to verbal commands/sensation intact/normal strength/cranial nerves intact

## 2018-11-30 NOTE — H&P ADULT - NSHPOUTPATIENTPROVIDERS_GEN_ALL_CORE
PMD - PMMYNOR - Dr Coto PMD: Dr. Coto   GI: Dr. Morales  Endo: Dr. Rios  Pods: Dr. Arroyo  Ophtho: Dr. Sanabria  Heme/Onc: Dr. Rios

## 2018-11-30 NOTE — H&P ADULT - PROBLEM SELECTOR PLAN 7
on pravastatin 40mg QD  - c/w therapeutic statin equivalent in ED hypotensive  - hold BP meds for now  - on irbesartan 300mg QD at home, resume therapeutic ARB equivalent if BP becomes elevated

## 2018-11-30 NOTE — ED PROVIDER NOTE - RADIOLOGY FINDINGS
reviewed by me/CXR no acute abnormality reviewed by me/CXR no acute abnormality/CT head - no acute findings
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
## 2018-11-30 NOTE — H&P ADULT - NSHPSOCIALHISTORY_GEN_ALL_CORE
Lives with wife  Ambulates independently    EtOH:  Tobacco:  Drugs: Marital Status:  (  x )    (   ) Single    (   )    (  )   Occupation:   Lives with: (  ) alone  ( x ) children   ( x ) spouse   (  ) parents  (  ) other    Substance Use (street drugs): ( x ) never used  (  ) other:  Tobacco Usage:  (  x ) never smoked   (   ) former smoker   (   ) current smoker  (     ) pack year  (        ) last cigarette date  Alcohol Usage: Former social drinker, currently denies Etoh use    Health Management  For male:  Last colonoscopy (     ) No  (  x  ) Yes  ( 6/18   ) Date  ( x   ) Normal

## 2018-11-30 NOTE — ED ADULT NURSE NOTE - NSIMPLEMENTINTERV_GEN_ALL_ED
Implemented All Universal Safety Interventions:  [place] to call system. Call bell, personal items and telephone within reach. Instruct patient to call for assistance. Room bathroom lighting operational. Non-slip footwear when patient is off stretcher. Physically safe environment: no spills, clutter or unnecessary equipment. Stretcher in lowest position, wheels locked, appropriate side rails in place.
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
## 2018-11-30 NOTE — H&P ADULT - PROBLEM SELECTOR PLAN 9
on lasix 20mg QD and Klor 10mEq QD  - c/w lasix 20mg QD and Klor 10mEq QD on gabapentin 800mg QID  - c/w gabapentin 800mg QID

## 2018-11-30 NOTE — ED ADULT NURSE NOTE - PERIPHERAL VASCULAR WDL
Rupesh Soto, pt feeling better since discharge.  Has scheduled visit with you.  Thank you, Radha  
Pulses equal bilaterally, no edema present.

## 2018-11-30 NOTE — H&P ADULT - PROBLEM SELECTOR PLAN 8
on gabapentin 800mg QID  - c/w gabapentin 800mg QID on pravastatin 40mg QD  - c/w therapeutic statin equivalent

## 2018-11-30 NOTE — ED ADULT NURSE NOTE - PMH
Diabetes    Fatty liver disease, nonalcoholic    Hepatic encephalopathy    Hypercholesteremia    Hypertension    Neuropathy    Obesity    Renal stones  25 years ago

## 2018-11-30 NOTE — H&P ADULT - GASTROINTESTINAL DETAILS
bowel sounds normal/soft/nontender/no distention bowel sounds normal/no bruit/soft/nontender/no masses palpable/no guarding/no organomegaly/no rebound tenderness/no rigidity/no distention

## 2018-11-30 NOTE — H&P ADULT - PROBLEM SELECTOR PLAN 4
on lactulose at home  - c/w lactulose 30mL TID ? compliant with insulin; Pt uses insulin samples due to high cost   in ED  -Hold home medications: humalog 20 breakfast & lunch, humalog 30 dinner, toujeo 50 at night  -Continue  -Hypoglycemia Protocol, Mod Dose Insulin Sliding Coverage, Accuchecks AC&HS.  -Diet: clears Consistent Carbs, advance as tolerated   -Follow up HbA1c.  -consult Endo ([facility]) ? compliant with insulin; Pt uses insulin samples due to high cost   in ED  -Hold home medications: humalog 20 breakfast & lunch, humalog 30 dinner, toujeo 50 at night  -Continue  -Hypoglycemia Protocol, Low Dose Insulin Sliding Coverage, Lantus 30 units qhs, Accuchecks AC&HS.  -Diet: clears Consistent Carbs, advance as tolerated   -Follow up HbA1c. ? compliant with insulin; Pt uses insulin samples due to high cost   in ED  -Hold home medications: Humalog 20 breakfast & lunch, Humalog 30 dinner-pre meals ,  toujeo 50 u SC at night    -Hypoglycemia Protocol, Low Dose Insulin Sliding Coverage, Lantus 30 units qhs, Accuchecks AC&HS.  -Diet: clears Consistent Carbs, advance as tolerated   -Follow up HbA1c.

## 2018-11-30 NOTE — H&P ADULT - NSHPPHYSICALEXAM_GEN_ALL_CORE
Vital Signs (24 Hrs):  T(C): 36.3 (11-30-18 @ 16:15), Max: 36.3 (11-30-18 @ 16:04)  HR: 91 (11-30-18 @ 17:38) (86 - 91)  BP: 103/42 (11-30-18 @ 17:38) (85/59 - 103/42)  RR: 15 (11-30-18 @ 17:38) (15 - 20)  SpO2: 98% (11-30-18 @ 17:38) (98% - 100%)

## 2018-11-30 NOTE — ED PROVIDER NOTE - CHIEF COMPLAINT
The patient is a 62y Male complaining of syncope. The patient is a 62y Male complaining of near syncope.

## 2018-11-30 NOTE — CONSULT NOTE ADULT - SUBJECTIVE AND OBJECTIVE BOX
Chief Complaint:  Patient is a 62y old  Male who presents with a chief complaint of vomiting and near syncope Pt is a 61 y/o M with PMHx of Type 2 DM, HTN, HLD, obesity, nephrolithiasis (last stone 25 years ago), neuropathy, HE, NAFLD, who presents with vomiting and syncope/falling out of a moving car. Pt states that [place] and felt "off", his vision was cloudy, he pulled over the car and stopped and vomited. Vomitus was "creamy white." Pt denies seeing blood in vomitus. His dtr then took over driving and Pt again felt like he needed to vomit and told dtr to pull over. He thought the car had stopped and he opened the door and fell out of the moving car. He laid on the ground for ~ 15 minutes. Pt says he possibly lost consciousness. States when he was lying on the ground felt very cold. Pt has had "terrible heartburn" for 2 days and felt chills for 2 days. Pt denies abdominal pain, CP, palp, SOB, cough, myalgias, rash, HA. Pt admits to chills, vomiting (4 episodes), diarrhea (from lactulose), dizziness, pedal edema, tingling in hands (chronic, intermittent). Pt denies recent travel, sick contacts, different eating patterns.     In ED Pt's vitals were: T(C): 36.3, HR: 91, BP: 85/59, RR: 15, SpO2: 98% on RA  Labs significant for lactate 5.6, , Hgb 10.4, total bili 3.7, alk phos 183, ammonia 72, Mg 1.4. UA - trace LE, mod blood, RBC 6-10, urine glucose 1,000.   CXR - negative chest  Abd Xray - There is mild content in the colon and no overt sense of bowel obstruction.  CT head - no intracranial hemorrhage or mass effect  CT cervical - Multilevel degenerative disc disease/cervical spondylosis, with posterior osteophyte disc complex C6-7 resulting in flattening of the cervical cord. If there is a clinical concern for spinal cord injury, recommend further evaluation with MRI if there are no clinical contraindications.  EKG - NSR, 93bpm    Pt given 2L NS bolus, zofran, pantoprazole, famotidine, carafate.      Review of Systems:  · Negative General Symptoms	no fever; no sweating	  · General Symptoms	chills	  · Negative Skin Symptoms	no rash; no itching	  · Ophthalmologic Symptoms	blurred vision L; blurred vision R	  · Negative ENMT Symptoms	no hearing difficulty; no sinus symptoms; no throat pain; no dysphagia	  · Negative Respiratory and Thorax Symptoms	no wheezing; no dyspnea; no cough; no hemoptysis	  · Negative Cardiovascular Symptoms	no chest pain; no palpitations; no dyspnea on exertion	  · Cardiovascular Symptoms	peripheral edema	  · Negative Gastrointestinal Symptoms	no nausea; no constipation; no abdominal pain; no melena; no hematochezia	  · Gastrointestinal Symptoms	vomiting; diarrhea	  · Negative General Genitourinary Symptoms	no hematuria; no flank pain L; no flank pain R; no dysuria	  · Negative Musculoskeletal Symptoms	no joint swelling; no myalgia	  · Musculoskeletal Symptoms	neck pain	  · Musculoskeletal Comments	chronic neck pain	  · Negative Neurological Symptoms	no paresthesias; no tremors; no headache; no confusion	  · Neurological Symptoms	syncope	  · Neurological Comments	B/L hand tingling (chronic)	  well known to us he stopped taking ,eds and became confused    Allergies:  codeine (Anaphylaxis)      Medications:  famotidine    Tablet 20 milliGRAM(s) Oral daily      PMHX/PSHX:  Hepatic encephalopathy  Obesity  Fatty liver disease, nonalcoholic  Renal stones  Hypertension  Neuropathy  Hypercholesteremia  Diabetes  S/P cholecystectomy  No significant past surgical history      Family history:  Family history of type 2 diabetes mellitus (Mother)  Family history of hypertension (Father)  Family history of stomach cancer (Father)  No pertinent family history in first degree relatives      Social History: no etoh no cigs no ivda  with children  retired    ROS:     General:  No wt loss, fevers, chills, night sweats, fatigue,   Eyes:  Good vision, no reported pain  ENT:  No sore throat, pain, runny nose, dysphagia  CV:  No pain, palpitations, hypo/hypertension  Resp:  No dyspnea, cough, tachypnea, wheezing  GI:  No pain, No nausea, No vomiting, No diarrhea, No constipation, No weight loss, No fever, No pruritis, No rectal bleeding, No tarry stools, No dysphagia,  :  No pain, bleeding, incontinence, nocturia  Muscle:  No pain, weakness  Neuro:  No weakness, tingling, memory problems  Psych:  No fatigue, insomnia, mood problems, depression  Endocrine:  No polyuria, polydipsia, cold/heat intolerance  Heme:  No petechiae, ecchymosis, easy bruisability  Skin:  No rash, tattoos, scars, edema      PHYSICAL EXAM:   Vital Signs:  Vital Signs Last 24 Hrs  T(C): 36.5 (2018 20:09), Max: 36.5 (2018 20:09)  T(F): 97.7 (2018 20:09), Max: 97.7 (2018 20:09)  HR: 102 (2018 20:09) (86 - 102)  BP: 105/51 (2018 20:09) (85/59 - 105/51)  BP(mean): --  RR: 14 (2018 20:09) (14 - 20)  SpO2: 98% (2018 20:09) (98% - 100%)  Daily Height in cm: 190.5 (2018 16:04)    Daily     GENERAL:  Appears stated age, well-groomed, well-nourished, no distress  HEENT:  NC/AT,  conjunctivae clear and pink, no thyromegaly, nodules, adenopathy, no JVD, sclera -anicteric  CHEST:  Full & symmetric excursion, no increased effort, breath sounds clear  HEART:  Regular rhythm, S1, S2, no murmur/rub/S3/S4, no abdominal bruit, no edema  ABDOMEN:  Soft, non-tender, non-distended, normoactive bowel sounds,  no masses ,no hepato-splenomegaly, no signs of chronic liver disease  EXTEREMITIES:  no cyanosis,clubbing or edema  SKIN:  No rash/erythema/ecchymoses/petechiae/wounds/abscess/warm/dry  NEURO:  Alert, oriented, no asterixis, no tremor, no encephalopathy    LABS:                        10.4   7.17  )-----------( 109      ( 2018 16:25 )             30.8     11-30    140  |  104  |  22  ----------------------------<  226<H>  4.1   |  25  |  1.20    Ca    8.3<L>      2018 19:33  Phos  3.1     11-  Mg     1.4     -30    TPro  5.6<L>  /  Alb  2.3<L>  /  TBili  3.7<H>  /  DBili  x   /  AST  30  /  ALT  20  /  AlkPhos  183<H>  11-30    LIVER FUNCTIONS - ( 2018 16:25 )  Alb: 2.3 g/dL / Pro: 5.6 g/dL / ALK PHOS: 183 U/L / ALT: 20 U/L / AST: 30 U/L / GGT: x           PT/INR - ( 2018 16:25 )   PT: 19.6 sec;   INR: 1.69 ratio         PTT - ( 2018 16:25 )  PTT:33.1 sec  Urinalysis Basic - ( 2018 18:30 )    Color: Yellow / Appearance: Slightly Turbid / S.015 / pH: x  Gluc: x / Ketone: Trace  / Bili: Small / Urobili: Negative   Blood: x / Protein: 25 mg/dL / Nitrite: Negative   Leuk Esterase: Trace / RBC: 6-10 /HPF / WBC 3-5   Sq Epi: x / Non Sq Epi: Occasional / Bacteria: Occasional      Amylase Serum--      Lipase serum70       [id number]      Imaging:

## 2018-11-30 NOTE — H&P ADULT - PROBLEM SELECTOR PLAN 1
? etiology; hx of PUD  - consult GI ([facility])  - c/w zofran, pantoprazole, carafate ? etiology; hx of PUD  - consult GI ([facility]), appreciate recs  - c/w zofran, pantoprazole, carafate  - clear liquid diet, advance as tolerated ? etiology; hx of PUD  - consult GI ([facility]en), appreciate recs  - c/w zofran q6hr, IV protonix BID, carafate TID  - clear liquid diet, advance as tolerated  - IVF 60cc/hr NS poss sec to dehydration vs vasovagal ? LOC- unclear, Hypotension   -HOLD Lasix & ARB  - CT Head neg , CT Neck -No Fx , S/P  IV Fluid bolus x 4 Lit given  - EKG - normal sinus rhythm;   - Neurology Dr Peña called  - consult cardio ([facility]), appreciate recs  - fall risk precautions, OOB to chair

## 2018-11-30 NOTE — H&P ADULT - PROBLEM SELECTOR PLAN 3
? compliant with insulin; Pt uses insulin samples due to high cost   in ED  -Hold home medications: humalog, toujeo  -Continue  -Hypoglycemia Protocol, Low Dose Insulin Sliding Coverage, Accuchecks AC&HS.  -Diet: Consistent Carbs w/ Evening Snack.  -Follow up HbA1c.  -consult [facility] ([facility]) initial 5.6, repeat after 2L NS boluses 4.0; NO leukocytosis, afebrile   ? poss sec to starting Metformin 3d ago (left over medications), per pharmacy Pt has not picked up Rx for metformin in 3 months  - f/u RVP  - f/u BCx, UCx initial 5.6, repeat after 2L NS boluses 4.0; NO leukocytosis, afebrile   ? poss sec to starting Metformin 500mg 3d ago (left over medications), per pharmacy Pt has not picked up Rx for metformin in 3 months  - f/u RVP  - f/u BCx, UCx  - give another 2L NS bolus, repeat lactate initial 5.6, repeat after 2L NS boluses 4.0; NO leukocytosis, afebrile   Likely sec to re starting Metformin 500mg  daily since Tuesday -3d ago (left over medications), per pharmacy Pt has not picked up Rx for metformin in 3 months  - f/u RVP- neg  - f/u BCx, UCx, IV fluids   - give another 2L NS bolus, repeat lactate

## 2018-12-01 ENCOUNTER — TRANSCRIPTION ENCOUNTER (OUTPATIENT)
Age: 63
End: 2018-12-01

## 2018-12-01 DIAGNOSIS — K92.2 GASTROINTESTINAL HEMORRHAGE, UNSPECIFIED: ICD-10-CM

## 2018-12-01 LAB
ALBUMIN SERPL ELPH-MCNC: 2 G/DL — LOW (ref 3.3–5)
ALBUMIN SERPL ELPH-MCNC: 2.2 G/DL — LOW (ref 3.3–5)
ALP SERPL-CCNC: 137 U/L — HIGH (ref 40–120)
ALP SERPL-CCNC: 139 U/L — HIGH (ref 40–120)
ALT FLD-CCNC: 19 U/L — SIGNIFICANT CHANGE UP (ref 12–78)
ALT FLD-CCNC: 20 U/L — SIGNIFICANT CHANGE UP (ref 12–78)
AMMONIA BLD-MCNC: 123 UMOL/L — HIGH (ref 11–32)
ANION GAP SERPL CALC-SCNC: 8 MMOL/L — SIGNIFICANT CHANGE UP (ref 5–17)
ANION GAP SERPL CALC-SCNC: 9 MMOL/L — SIGNIFICANT CHANGE UP (ref 5–17)
APTT BLD: 33.4 SEC — SIGNIFICANT CHANGE UP (ref 27.5–36.3)
AST SERPL-CCNC: 31 U/L — SIGNIFICANT CHANGE UP (ref 15–37)
AST SERPL-CCNC: 32 U/L — SIGNIFICANT CHANGE UP (ref 15–37)
BASOPHILS # BLD AUTO: 0.02 K/UL — SIGNIFICANT CHANGE UP (ref 0–0.2)
BASOPHILS NFR BLD AUTO: 0.3 % — SIGNIFICANT CHANGE UP (ref 0–2)
BILIRUB SERPL-MCNC: 3.7 MG/DL — HIGH (ref 0.2–1.2)
BILIRUB SERPL-MCNC: 3.7 MG/DL — HIGH (ref 0.2–1.2)
BUN SERPL-MCNC: 33 MG/DL — HIGH (ref 7–23)
BUN SERPL-MCNC: 35 MG/DL — HIGH (ref 7–23)
CALCIUM SERPL-MCNC: 7.8 MG/DL — LOW (ref 8.5–10.1)
CALCIUM SERPL-MCNC: 8.3 MG/DL — LOW (ref 8.5–10.1)
CHLORIDE SERPL-SCNC: 108 MMOL/L — SIGNIFICANT CHANGE UP (ref 96–108)
CHLORIDE SERPL-SCNC: 110 MMOL/L — HIGH (ref 96–108)
CK MB BLD-MCNC: 0.9 % — SIGNIFICANT CHANGE UP (ref 0–3.5)
CK MB CFR SERPL CALC: 2.8 NG/ML — SIGNIFICANT CHANGE UP (ref 0–3.6)
CK SERPL-CCNC: 301 U/L — SIGNIFICANT CHANGE UP (ref 26–308)
CO2 SERPL-SCNC: 24 MMOL/L — SIGNIFICANT CHANGE UP (ref 22–31)
CO2 SERPL-SCNC: 24 MMOL/L — SIGNIFICANT CHANGE UP (ref 22–31)
CREAT SERPL-MCNC: 1.1 MG/DL — SIGNIFICANT CHANGE UP (ref 0.5–1.3)
CREAT SERPL-MCNC: 1.1 MG/DL — SIGNIFICANT CHANGE UP (ref 0.5–1.3)
CULTURE RESULTS: SIGNIFICANT CHANGE UP
EOSINOPHIL # BLD AUTO: 0.01 K/UL — SIGNIFICANT CHANGE UP (ref 0–0.5)
EOSINOPHIL NFR BLD AUTO: 0.2 % — SIGNIFICANT CHANGE UP (ref 0–6)
GLUCOSE SERPL-MCNC: 235 MG/DL — HIGH (ref 70–99)
GLUCOSE SERPL-MCNC: 238 MG/DL — HIGH (ref 70–99)
HBA1C BLD-MCNC: 8.3 % — HIGH (ref 4–5.6)
HCT VFR BLD CALC: 18.1 % — CRITICAL LOW (ref 39–50)
HCT VFR BLD CALC: 20.7 % — CRITICAL LOW (ref 39–50)
HCT VFR BLD CALC: 24 % — LOW (ref 39–50)
HCT VFR BLD CALC: 24.3 % — LOW (ref 39–50)
HCT VFR BLD CALC: 25.2 % — LOW (ref 39–50)
HGB BLD-MCNC: 6.2 G/DL — CRITICAL LOW (ref 13–17)
HGB BLD-MCNC: 7 G/DL — CRITICAL LOW (ref 13–17)
HGB BLD-MCNC: 8.3 G/DL — LOW (ref 13–17)
HGB BLD-MCNC: 8.4 G/DL — LOW (ref 13–17)
HGB BLD-MCNC: 8.6 G/DL — LOW (ref 13–17)
IMM GRANULOCYTES NFR BLD AUTO: 0.5 % — SIGNIFICANT CHANGE UP (ref 0–1.5)
INR BLD: 1.82 RATIO — HIGH (ref 0.88–1.16)
LACTATE SERPL-SCNC: 2.1 MMOL/L — HIGH (ref 0.7–2)
LACTATE SERPL-SCNC: 3.7 MMOL/L — HIGH (ref 0.7–2)
LACTATE SERPL-SCNC: 4.6 MMOL/L — CRITICAL HIGH (ref 0.7–2)
LYMPHOCYTES # BLD AUTO: 1.4 K/UL — SIGNIFICANT CHANGE UP (ref 1–3.3)
LYMPHOCYTES # BLD AUTO: 21.5 % — SIGNIFICANT CHANGE UP (ref 13–44)
MAGNESIUM SERPL-MCNC: 1.6 MG/DL — SIGNIFICANT CHANGE UP (ref 1.6–2.6)
MCHC RBC-ENTMCNC: 33.3 PG — SIGNIFICANT CHANGE UP (ref 27–34)
MCHC RBC-ENTMCNC: 33.9 PG — SIGNIFICANT CHANGE UP (ref 27–34)
MCHC RBC-ENTMCNC: 34 PG — SIGNIFICANT CHANGE UP (ref 27–34)
MCHC RBC-ENTMCNC: 34.1 GM/DL — SIGNIFICANT CHANGE UP (ref 32–36)
MCHC RBC-ENTMCNC: 34.3 GM/DL — SIGNIFICANT CHANGE UP (ref 32–36)
MCHC RBC-ENTMCNC: 34.4 PG — HIGH (ref 27–34)
MCHC RBC-ENTMCNC: 34.6 GM/DL — SIGNIFICANT CHANGE UP (ref 32–36)
MCHC RBC-ENTMCNC: 34.6 GM/DL — SIGNIFICANT CHANGE UP (ref 32–36)
MCV RBC AUTO: 97.3 FL — SIGNIFICANT CHANGE UP (ref 80–100)
MCV RBC AUTO: 98 FL — SIGNIFICANT CHANGE UP (ref 80–100)
MCV RBC AUTO: 99.6 FL — SIGNIFICANT CHANGE UP (ref 80–100)
MCV RBC AUTO: 99.6 FL — SIGNIFICANT CHANGE UP (ref 80–100)
MONOCYTES # BLD AUTO: 0.43 K/UL — SIGNIFICANT CHANGE UP (ref 0–0.9)
MONOCYTES NFR BLD AUTO: 6.6 % — SIGNIFICANT CHANGE UP (ref 2–14)
NEUTROPHILS # BLD AUTO: 4.61 K/UL — SIGNIFICANT CHANGE UP (ref 1.8–7.4)
NEUTROPHILS NFR BLD AUTO: 70.9 % — SIGNIFICANT CHANGE UP (ref 43–77)
NRBC # BLD: 0 /100 WBCS — SIGNIFICANT CHANGE UP (ref 0–0)
OB PNL STL: POSITIVE
PHOSPHATE SERPL-MCNC: 2.5 MG/DL — SIGNIFICANT CHANGE UP (ref 2.5–4.5)
PLATELET # BLD AUTO: 122 K/UL — LOW (ref 150–400)
PLATELET # BLD AUTO: 84 K/UL — LOW (ref 150–400)
PLATELET # BLD AUTO: 89 K/UL — LOW (ref 150–400)
PLATELET # BLD AUTO: 90 K/UL — LOW (ref 150–400)
POTASSIUM SERPL-MCNC: 4.6 MMOL/L — SIGNIFICANT CHANGE UP (ref 3.5–5.3)
POTASSIUM SERPL-MCNC: 4.7 MMOL/L — SIGNIFICANT CHANGE UP (ref 3.5–5.3)
POTASSIUM SERPL-SCNC: 4.6 MMOL/L — SIGNIFICANT CHANGE UP (ref 3.5–5.3)
POTASSIUM SERPL-SCNC: 4.7 MMOL/L — SIGNIFICANT CHANGE UP (ref 3.5–5.3)
PROT SERPL-MCNC: 5 G/DL — LOW (ref 6–8.3)
PROT SERPL-MCNC: 5 G/DL — LOW (ref 6–8.3)
PROTHROM AB SERPL-ACNC: 20.9 SEC — HIGH (ref 10–12.9)
RBC # BLD: 1.86 M/UL — LOW (ref 4.2–5.8)
RBC # BLD: 2.41 M/UL — LOW (ref 4.2–5.8)
RBC # BLD: 2.48 M/UL — LOW (ref 4.2–5.8)
RBC # BLD: 2.53 M/UL — LOW (ref 4.2–5.8)
RBC # FLD: 15.1 % — HIGH (ref 10.3–14.5)
RBC # FLD: 15.2 % — HIGH (ref 10.3–14.5)
RBC # FLD: 15.2 % — HIGH (ref 10.3–14.5)
RBC # FLD: 17.4 % — HIGH (ref 10.3–14.5)
SODIUM SERPL-SCNC: 141 MMOL/L — SIGNIFICANT CHANGE UP (ref 135–145)
SODIUM SERPL-SCNC: 142 MMOL/L — SIGNIFICANT CHANGE UP (ref 135–145)
SPECIMEN SOURCE: SIGNIFICANT CHANGE UP
TROPONIN I SERPL-MCNC: 0.02 NG/ML — SIGNIFICANT CHANGE UP (ref 0.01–0.04)
WBC # BLD: 5.34 K/UL — SIGNIFICANT CHANGE UP (ref 3.8–10.5)
WBC # BLD: 6.5 K/UL — SIGNIFICANT CHANGE UP (ref 3.8–10.5)
WBC # BLD: 7.41 K/UL — SIGNIFICANT CHANGE UP (ref 3.8–10.5)
WBC # BLD: 8.42 K/UL — SIGNIFICANT CHANGE UP (ref 3.8–10.5)
WBC # FLD AUTO: 5.34 K/UL — SIGNIFICANT CHANGE UP (ref 3.8–10.5)
WBC # FLD AUTO: 6.5 K/UL — SIGNIFICANT CHANGE UP (ref 3.8–10.5)
WBC # FLD AUTO: 7.41 K/UL — SIGNIFICANT CHANGE UP (ref 3.8–10.5)
WBC # FLD AUTO: 8.42 K/UL — SIGNIFICANT CHANGE UP (ref 3.8–10.5)

## 2018-12-01 PROCEDURE — 71045 X-RAY EXAM CHEST 1 VIEW: CPT | Mod: 26

## 2018-12-01 PROCEDURE — 99291 CRITICAL CARE FIRST HOUR: CPT

## 2018-12-01 PROCEDURE — 74174 CTA ABD&PLVS W/CONTRAST: CPT | Mod: 26

## 2018-12-01 RX ORDER — PHYTONADIONE (VIT K1) 5 MG
5 TABLET ORAL ONCE
Qty: 0 | Refills: 0 | Status: DISCONTINUED | OUTPATIENT
Start: 2018-12-01 | End: 2018-12-01

## 2018-12-01 RX ORDER — PANTOPRAZOLE SODIUM 20 MG/1
8 TABLET, DELAYED RELEASE ORAL
Qty: 80 | Refills: 0 | Status: DISCONTINUED | OUTPATIENT
Start: 2018-12-01 | End: 2018-12-05

## 2018-12-01 RX ORDER — LACTULOSE 10 G/15ML
20 SOLUTION ORAL THREE TIMES A DAY
Qty: 0 | Refills: 0 | Status: DISCONTINUED | OUTPATIENT
Start: 2018-12-01 | End: 2018-12-02

## 2018-12-01 RX ORDER — ACETAMINOPHEN 500 MG
650 TABLET ORAL ONCE
Qty: 0 | Refills: 0 | Status: COMPLETED | OUTPATIENT
Start: 2018-12-01 | End: 2018-12-01

## 2018-12-01 RX ORDER — DIPHENHYDRAMINE HCL 50 MG
25 CAPSULE ORAL ONCE
Qty: 0 | Refills: 0 | Status: COMPLETED | OUTPATIENT
Start: 2018-12-01 | End: 2018-12-01

## 2018-12-01 RX ORDER — PANTOPRAZOLE SODIUM 20 MG/1
40 TABLET, DELAYED RELEASE ORAL
Qty: 0 | Refills: 0 | Status: DISCONTINUED | OUTPATIENT
Start: 2018-12-01 | End: 2018-12-01

## 2018-12-01 RX ORDER — FUROSEMIDE 40 MG
20 TABLET ORAL ONCE
Qty: 0 | Refills: 0 | Status: DISCONTINUED | OUTPATIENT
Start: 2018-12-01 | End: 2018-12-01

## 2018-12-01 RX ORDER — INSULIN GLARGINE 100 [IU]/ML
15 INJECTION, SOLUTION SUBCUTANEOUS AT BEDTIME
Qty: 0 | Refills: 0 | Status: DISCONTINUED | OUTPATIENT
Start: 2018-12-01 | End: 2018-12-02

## 2018-12-01 RX ORDER — METOCLOPRAMIDE HCL 10 MG
10 TABLET ORAL ONCE
Qty: 0 | Refills: 0 | Status: COMPLETED | OUTPATIENT
Start: 2018-12-01 | End: 2018-12-01

## 2018-12-01 RX ORDER — INSULIN LISPRO 100/ML
VIAL (ML) SUBCUTANEOUS EVERY 6 HOURS
Qty: 0 | Refills: 0 | Status: DISCONTINUED | OUTPATIENT
Start: 2018-12-01 | End: 2018-12-02

## 2018-12-01 RX ORDER — LACTULOSE 10 G/15ML
20 SOLUTION ORAL
Qty: 0 | Refills: 0 | Status: DISCONTINUED | OUTPATIENT
Start: 2018-12-01 | End: 2018-12-01

## 2018-12-01 RX ORDER — PHYTONADIONE (VIT K1) 5 MG
5 TABLET ORAL ONCE
Qty: 0 | Refills: 0 | Status: COMPLETED | OUTPATIENT
Start: 2018-12-01 | End: 2018-12-01

## 2018-12-01 RX ORDER — MAGNESIUM SULFATE 500 MG/ML
2 VIAL (ML) INJECTION ONCE
Qty: 0 | Refills: 0 | Status: COMPLETED | OUTPATIENT
Start: 2018-12-01 | End: 2018-12-01

## 2018-12-01 RX ADMIN — ONDANSETRON 4 MILLIGRAM(S): 8 TABLET, FILM COATED ORAL at 14:54

## 2018-12-01 RX ADMIN — Medication 50 GRAM(S): at 00:52

## 2018-12-01 RX ADMIN — LACTULOSE 20 GRAM(S): 10 SOLUTION ORAL at 14:54

## 2018-12-01 RX ADMIN — SODIUM CHLORIDE 1000 MILLILITER(S): 9 INJECTION INTRAMUSCULAR; INTRAVENOUS; SUBCUTANEOUS at 00:35

## 2018-12-01 RX ADMIN — LACTULOSE 20 GRAM(S): 10 SOLUTION ORAL at 05:49

## 2018-12-01 RX ADMIN — Medication 1 GRAM(S): at 17:31

## 2018-12-01 RX ADMIN — URSODIOL 300 MILLIGRAM(S): 250 TABLET, FILM COATED ORAL at 05:30

## 2018-12-01 RX ADMIN — Medication 3: at 17:42

## 2018-12-01 RX ADMIN — URSODIOL 300 MILLIGRAM(S): 250 TABLET, FILM COATED ORAL at 22:11

## 2018-12-01 RX ADMIN — Medication 104 MILLIGRAM(S): at 23:37

## 2018-12-01 RX ADMIN — Medication 650 MILLIGRAM(S): at 16:20

## 2018-12-01 RX ADMIN — Medication 2: at 12:35

## 2018-12-01 RX ADMIN — Medication 4: at 23:22

## 2018-12-01 RX ADMIN — Medication 1 GRAM(S): at 12:39

## 2018-12-01 RX ADMIN — PANTOPRAZOLE SODIUM 40 MILLIGRAM(S): 20 TABLET, DELAYED RELEASE ORAL at 00:04

## 2018-12-01 RX ADMIN — GABAPENTIN 800 MILLIGRAM(S): 400 CAPSULE ORAL at 12:39

## 2018-12-01 RX ADMIN — Medication 25 MILLIGRAM(S): at 16:20

## 2018-12-01 RX ADMIN — GABAPENTIN 800 MILLIGRAM(S): 400 CAPSULE ORAL at 23:22

## 2018-12-01 RX ADMIN — Medication 2: at 08:23

## 2018-12-01 RX ADMIN — GABAPENTIN 800 MILLIGRAM(S): 400 CAPSULE ORAL at 05:30

## 2018-12-01 RX ADMIN — SODIUM CHLORIDE 60 MILLILITER(S): 9 INJECTION INTRAMUSCULAR; INTRAVENOUS; SUBCUTANEOUS at 00:52

## 2018-12-01 RX ADMIN — GABAPENTIN 800 MILLIGRAM(S): 400 CAPSULE ORAL at 17:31

## 2018-12-01 RX ADMIN — PANTOPRAZOLE SODIUM 10 MG/HR: 20 TABLET, DELAYED RELEASE ORAL at 17:54

## 2018-12-01 RX ADMIN — PANTOPRAZOLE SODIUM 40 MILLIGRAM(S): 20 TABLET, DELAYED RELEASE ORAL at 05:30

## 2018-12-01 RX ADMIN — Medication 1 GRAM(S): at 05:30

## 2018-12-01 RX ADMIN — Medication 101 MILLIGRAM(S): at 17:58

## 2018-12-01 RX ADMIN — Medication 1 GRAM(S): at 23:22

## 2018-12-01 RX ADMIN — URSODIOL 300 MILLIGRAM(S): 250 TABLET, FILM COATED ORAL at 14:54

## 2018-12-01 RX ADMIN — INSULIN GLARGINE 15 UNIT(S): 100 INJECTION, SOLUTION SUBCUTANEOUS at 22:10

## 2018-12-01 RX ADMIN — Medication 650 MILLIGRAM(S): at 16:50

## 2018-12-01 NOTE — PROGRESS NOTE ADULT - PROBLEM SELECTOR PLAN 1
pt had 4 bloody BM ? etiology  rapid upper GI  ? etiology; hx of PUD, Esophagitis, Gastritis, non bleeding ulcers on last EGD10/18  - consult GI ([facility]), D/W at detail.  - c/w Zofran q6hr, IV Protonix  BID change to Drip , Carafate TID  - NPO  - Serial CBC, plan for FFP, 2 u pRBC & Vit K 5 mg x 1 -Coagulapthy pt had 4 bloody BM ? etiology  rapid upper GI  ? etiology; hx of PUD, Esophagitis, Gastritis, non bleeding ulcers on last EGD10/18  - consult GI ([facility]), D/W at detail. ICU called this AM  - c/w Zofran q6hr, IV Protonix  BID change to Drip , Carafate TID  - NPO  - Serial CBC, plan for FFP, 2 u pRBC & Vit K 5 mg x 1 -Coagulapathy with liver disease  -ICU Re Eval , d/w PA pt had 4 bloody BM ? etiology  rapid upper GI bleed with Ac Blood Loss Anemia   ? etiology; hx of PUD, Esophagitis, Gastritis, non bleeding ulcers on last EGD10/18  - consult GI ([facility]), D/W at detail. ICU called this AM  - c/w Zofran q6hr, IV Protonix  BID change to Drip , Carafate TID  - NPO  - Serial CBC, plan for FFP, 2 u pRBC & Vit K 5 mg x 1 -Coagulapathy with liver disease  -ICU Re Eval , d/w PA

## 2018-12-01 NOTE — PROGRESS NOTE ADULT - ASSESSMENT
63yo Male presenting with:    1. GI Bleed, NOS  2. Hemorrhagic Shock  3. AMS  4. Lethargy    Plan:    Neuro: Avoid deliriogenic medications. No pain at present time    CV: Repeat lactate. Aggressive volume resuscitation with blood. Serial labs. May need Invasive catheters for HD monitoring in setting of shock.    Pulm: Maintain O2 sat > 92%. Aspiration precautions. If patient shows worsening signs of shock will intubate for airway protection    GI/Nutrition: Will hold Lactulose at this time. Hold all BP medications. Serial CBC. Will send off repeat lactate at this time. Will call GI for emergent reevaluation as patient may require a scope if he is a non responder to PRBC. Continue Protonix gtt. onset of nausea yesterday may mean this is an upper gi source, likely related to known ulcer.     /Renal: Beasley to be placed for strict I&Os in the critical care patient. Need to send off a BMP and monitor UO to gauge perfusion adequacy. Avoid nephrotoxic agents. Patient has received 6L IVF. More prudent at this point to utilize colloid (PRBC) for additional resuscitation.    ID: No abx needed at this time. Avoid hypothermia    Heme: Transfuse PRBC at this time. monitor Platelet count and coags. Ideally want INR< 1.4 Vitamin K if above. Will resend coags now    Skin: Intact    Proph: Hold DVT proph at present. On Protonix gtt and carafate    Lines: PIV x 3 at present although all 20G IVs. May need cordis if additional episodes of bleeding and/or poor response to 1st PRBC by way of hemodynamics    Dispo: Patient is critically ill and at high risk for death without intervention from GI and ICU teams. Discussed with ICU attending and will transfer to ICU at this time    Critical Care time spent excluding that which is bundled and/or procedural = 45 minutes

## 2018-12-01 NOTE — PROGRESS NOTE ADULT - PROBLEM SELECTOR PLAN 4
? compliant with insulin; Pt uses insulin samples due to high cost   in ED  -Hold home medications: Humalog 20 breakfast & lunch, Humalog 30 dinner-pre meals ,  toujeo 50 u SC at night    -Hypoglycemia Protocol, Low Dose Insulin Sliding Coverage, Lantus 30 units qhs, Accuchecks AC&HS.  -Diet: clears Consistent Carbs, advance as tolerated   -Follow up HbA1c. ? compliant with insulin; Pt uses insulin samples due to high cost    -Hypoglycemia Protocol, Low Dose Insulin Sliding Coverage, Lantus 15 units qhs, Accu-Cheks AC&HS.  -Diet: NPO  -Follow up HbA1c.  -Hold home medications: Humalog 20 breakfast & lunch, Humalog 30 dinner-pre meals ,  toujeo 50 u SC at night

## 2018-12-01 NOTE — PROGRESS NOTE ADULT - SUBJECTIVE AND OBJECTIVE BOX
Neurology follow up note    JESSICA [id number]      Interval History:    Patient events noted BRBPR felt lightheaded     MEDICATIONS    atorvastatin 10 milliGRAM(s) Oral at bedtime  dextrose 40% Gel 15 Gram(s) Oral once PRN  dextrose 5%. 1000 milliLiter(s) IV Continuous <Continuous>  dextrose 50% Injectable 12.5 Gram(s) IV Push once  dextrose 50% Injectable 25 Gram(s) IV Push once  dextrose 50% Injectable 25 Gram(s) IV Push once  gabapentin 800 milliGRAM(s) Oral four times a day  glucagon  Injectable 1 milliGRAM(s) IntraMuscular once PRN  insulin glargine Injectable (LANTUS) 30 Unit(s) SubCutaneous at bedtime  insulin lispro (HumaLOG) corrective regimen sliding scale   SubCutaneous every 6 hours  lactulose Syrup 20 Gram(s) Oral three times a day  ondansetron Injectable 4 milliGRAM(s) IV Push every 6 hours PRN  pantoprazole  Injectable 40 milliGRAM(s) IV Push two times a day  rifaximin 550 milliGRAM(s) Oral two times a day  sucralfate 1 Gram(s) Oral every 6 hours  ursodiol Capsule 300 milliGRAM(s) Oral three times a day      Allergies    codeine (Anaphylaxis)    Intolerances        Height (cm): 190.5 ( @ 16:04)  Weight (kg): 138.3 ( @ 16:04)  BMI (kg/m2): 38.1 ( @ 16:04)    Vital Signs Last 24 Hrs  T(C): 36.4 (01 Dec 2018 10:50), Max: 36.8 (01 Dec 2018 00:10)  T(F): 97.5 (01 Dec 2018 10:50), Max: 98.2 (01 Dec 2018 00:10)  HR: 99 (01 Dec 2018 10:50) (79 - 105)  BP: 113/67 (01 Dec 2018 10:50) (85/59 - 128/78)  BP(mean): --  RR: 17 (01 Dec 2018 10:50) (14 - 20)  SpO2: 99% (01 Dec 2018 10:50) (96% - 100%)      REVIEW OF SYSTEMS:  Constitutional: No fever, chills, fatigue, generalized weakness  Eyes: no eye pain, visual disturbances, or discharge  ENT:  No difficulty hearing, tinnitus, vertigo; No sinus or throat pain  Neck: No pain or stiffness  Respiratory: No cough, dyspnea, wheezing   Cardiovascular: No chest pain, palpitations,   Gastrointestinal: No abdominal or epigastric pain. No nausea, vomiting  No diarrhea or constipation.   Genitourinary: No dysuria, frequency, hematuria or incontinence  Neurological: No headaches, postive lightheadedness, no vertigo, numbness or tremors  Psychiatric: No depression, anxiety, mood swings or difficulty sleeping  Musculoskeletal: No joint pain or swelling; No muscle, back or extremity pain  Skin: No itching, burning, rashes or lesions   Lymph Nodes: No enlarged glands  Endocrine: No heat or cold intolerance; No hair loss   Allergy and Immunologic: No hives or eczema    On Neurological Examination:    The patient is awake, alert, oriented x3.    Extraocular movements were intact.    Pupils equal, round and reactive bilaterally, 3 mm to 2.    Speech was fluent.  Smile symmetric.    Motor:  Bilateral upper and lower 5/5.    Sensory:  Bilateral upper and lower intact to light touch.  No drift.  Finger-to-nose within normal limits.    Follow simple commands      GENERAL Exam: Nontoxic , No Acute Distress   	  HEENT:  normocephalic, atraumatic  		  LUNGS: Clear bilaterally    	  HEART: Normal S1S2   No murmur RRR        	  GI/ ABDOMEN:  Soft  Non tender    EXTREMITIES:   No Edema  No Clubbing  No Cyanosis     MUSCULOSKELETAL: decreased Range of Motion all 4 extremities   	   SKIN: Normal  No Ecchymosis               LABS:  CBC Full  -  ( 01 Dec 2018 08:58 )  WBC Count : 7.41 K/uL  Hemoglobin : 8.4 g/dL  Hematocrit : 24.3 %  Platelet Count - Automated : 89 K/uL  Mean Cell Volume : 98.0 fl  Mean Cell Hemoglobin : 33.9 pg  Mean Cell Hemoglobin Concentration : 34.6 gm/dL  Auto Neutrophil # : x  Auto Lymphocyte # : x  Auto Monocyte # : x  Auto Eosinophil # : x  Auto Basophil # : x  Auto Neutrophil % : x  Auto Lymphocyte % : x  Auto Monocyte % : x  Auto Eosinophil % : x  Auto Basophil % : x    Urinalysis Basic - ( 2018 18:30 )    Color: Yellow / Appearance: Slightly Turbid / S.015 / pH: x  Gluc: x / Ketone: Trace  / Bili: Small / Urobili: Negative   Blood: x / Protein: 25 mg/dL / Nitrite: Negative   Leuk Esterase: Trace / RBC: 6-10 /HPF / WBC 3-5   Sq Epi: x / Non Sq Epi: Occasional / Bacteria: Occasional          142  |  110<H>  |  35<H>  ----------------------------<  238<H>  4.6   |  24  |  1.10    Ca    8.3<L>      01 Dec 2018 08:58  Phos  2.5       Mg     1.6         TPro  5.0<L>  /  Alb  2.0<L>  /  TBili  3.7<H>  /  DBili  x   /  AST  32  /  ALT  20  /  AlkPhos  137<H>      Hemoglobin A1C: Hemoglobin A1C, Whole Blood: 8.3 % ( @ 10:32)      LIVER FUNCTIONS - ( 01 Dec 2018 08:58 )  Alb: 2.0 g/dL / Pro: 5.0 g/dL / ALK PHOS: 137 U/L / ALT: 20 U/L / AST: 32 U/L / GGT: x           Vitamin B12   PT/INR - ( 2018 16:25 )   PT: 19.6 sec;   INR: 1.69 ratio         PTT - ( 2018 16:25 )  PTT:33.1 sec      RADIOLOGY    ANALYSIS AND PLAN:  A 62-year-old with episode of loss of consciousness.  1.	For episode of loss of consciousness, suspect most likely a syncopal event with a prodrome of feeling lightheaded, blurry vision, warm and sweaty, nausea and vomiting, as per spouse looked pale and becoming in the emergency room hypotensive points towards cerebral hypoperfusion.  There is no clear sign on examination to suggest a new cerebrovascular accident has ensued and there is no clear history to suggest underlying epilepsy.  2.	I would recommend telemetry evaluation, rule out underlying arrhythmia.  3.	Monitor systolic blood pressure.  4.	For history of diabetes, would recommend strict control of the patient's blood sugars.  5.	For history of neuropathy, continue the patient on his gabapentin.  6.	For episode of spasms, I would check calcium and phosphorus levels.  Questionable this could be secondary to slightly low magnesium.  7.	Spoke with spouse, Ada, at bedside.  Her telephone number is [phone]. spoke to son today 18  8.	Greater than 45 minutes of time was spent with the patient on plan of care, reviewing data, speaking to family and multidisciplinary healthcare team.  9.	monitor H/H   10.	GI work up as needed       Greater than 45 minutes spent in direct patient care reviewing  the notes, lab data/ imaging , discussion with multidisciplinary team.

## 2018-12-01 NOTE — PROGRESS NOTE ADULT - PROBLEM SELECTOR PLAN 2
poss sec to dehydration vs vasovagal ? LOC- unclear, Hypotension   -HOLD Lasix & ARB  - CT Head neg , CT Neck -No Fx , S/P  IV Fluid bolus x 4 Lit given  - EKG - normal sinus rhythm;   - Neurology Dr Peña called  - consult cardio ([facility]), appreciate recs  - fall risk precautions, OOB to chair poss sec to vasovagal ? LOC- unclear, Hypotension   -HOLD Lasix & ARB  - CT Head neg , CT Neck -No Fx , S/P  IV Fluid bolus x 4 Lit in ER   - EKG - normal sinus rhythm;   - Neurology Dr Peña on case   - consult cardio ([facility]), appreciate recs  - fall risk precautions, OOB to chair

## 2018-12-01 NOTE — PROGRESS NOTE ADULT - PROBLEM SELECTOR PLAN 7
in ED hypotensive  - hold BP meds for now  - on irbesartan 300mg QD at home, resume therapeutic ARB equivalent if BP becomes elevated - hypotensive now after IV Fluid boluses 2/2 GI Bleed   - hold BP meds for now  - on irbesartan 300mg QD at home, resume therapeutic ARB equivalent if BP becomes elevated

## 2018-12-01 NOTE — PROGRESS NOTE ADULT - PROBLEM SELECTOR PLAN 1
hx of recent egd non bleeding esophageal ulcers, esophagitis, non bleeding duodenal ulcers, no EV/GV  brbpr x 2 overnight, no melena per nursing  VS reviewed, hgb trend noted sp multiple IVF boluses  check cta to localize bleeding  may need IR/sx eval  npo/ivf  ppi iv, carafate  trend h/h, coags, transfuse prn  hold ac asa nsaids  icu eval if hypotension persists  reports recent, normal colonoscopy  further recs pending above  plan dw attg and agreeable  will follow hx of recent egd non bleeding esophageal ulcers, esophagitis, non bleeding duodenal ulcers, no EV/GV  brbpr x 2 overnight, no melena per nursing  ?lgib vs ?brisk upper  VS reviewed, hgb trend noted sp multiple IVF boluses  check cta to localize bleeding  may need IR/sx eval  npo/ivf  ppi iv, carafate  trend h/h, coags, transfuse prn  hold ac asa nsaids  icu eval if hypotension persists  reports recent, normal colonoscopy  further recs pending above  may need egd/colon  plan dw attg and agreeable  will follow

## 2018-12-01 NOTE — PROVIDER CONTACT NOTE (OTHER) - ASSESSMENT
Pt ambulated back to bed with 1 person assist. VSS. Pt denies any CP, SOB dizziness, palpitations, headache. Zofran 4mg IVP ordered. Pt verbalized feeling better after vomiting and Zofran IVP.

## 2018-12-01 NOTE — PATIENT PROFILE ADULT - ABILITY TO HEAR (WITH HEARING AID OR HEARING APPLIANCE IF NORMALLY USED):
Left eye poor hearing/Mildly to Moderately Impaired: difficulty hearing in some environments or speaker may need to increase volume or speak distinctly

## 2018-12-01 NOTE — PROGRESS NOTE ADULT - PROBLEM SELECTOR PLAN 2
ammonia noted but mentating well  cont lactulose, titrate for 4 soft bms/day  cont rifaximin  trend ammonia levels  monitor stool output

## 2018-12-01 NOTE — PROGRESS NOTE ADULT - PROBLEM SELECTOR PLAN 6
-elevated Bili -?? Etiology  - pt follows GI (Dr. Morales) outpt.   -cont ppi, sandra, rifaximin bid ,loose bms/day  - f/u CMP in AM

## 2018-12-01 NOTE — PROCEDURE NOTE - ADDITIONAL PROCEDURE DETAILS
This central venous access is in the setting of lower GI bleeding with acute blood loss anemia and hypovolemic shock reciving multiple blood transfusions

## 2018-12-01 NOTE — PATIENT PROFILE ADULT - NSPROGENSOURCEINFO_GEN_A_NUR
patient/health record/Copy Forward H&P from 10/17/2018,info verified with pt & spouse@ bedside/family

## 2018-12-01 NOTE — PATIENT PROFILE ADULT - NSPROGENOTHERPROVIDER_GEN_A_NUR
DR Dillan Coto [phone]  Dr Morales [facility] [phone]  Dr Jose Enrique Grace eye [phone]/outpatient care

## 2018-12-01 NOTE — PROGRESS NOTE ADULT - ATTENDING COMMENTS
Pt seen, Examined, case & care plan d/w pt, residents at Detail.  D/W GI Dr Morales at Detail.  AM Labs Pt seen, Examined, case & care plan d/w pt, residents at Detail.  D/W GI Dr Franks  at Detail. D/W ICU PA   AM Labs

## 2018-12-01 NOTE — CHART NOTE - NSCHARTNOTEFT_GEN_A_CORE
Called by RN for episode of bloody bowel movement. Patient seen and examined at bedside. Denies any current SOB, dizziness, chest pain, focal weakness.       Vital Signs Last 24 Hrs  T(C): 36.8 (01 Dec 2018 00:10), Max: 36.8 (01 Dec 2018 00:10)  T(F): 98.2 (01 Dec 2018 00:10), Max: 98.2 (01 Dec 2018 00:10)  HR: 105 (01 Dec 2018 00:10) (79 - 105)  BP: 105/60 (01 Dec 2018 00:10) (85/59 - 128/78)  BP(mean): --  RR: 18 (01 Dec 2018 00:10) (14 - 20)  SpO2: 100% (01 Dec 2018 00:10) (96% - 100%)    General: Well developed, well nourished, NAD  Neurology: A&Ox3, nonfocal  Respiratory: CTA B/L, No W/R/R  CV: RRR, +S1/S2, no murmurs, rubs or gallops  Abdominal: obese, Soft, NT, ND +BS  Extremities: No C/C/E       A/P:  Pt is a 63 y/o M with PMHx of Type 2 DM, HTN, HLD, obesity, nephrolithiasis (last stone 25 years ago), neuropathy, HE, NAFLD, who presents with vomiting and near syncope/falling out of a moving car. Admitted for vomiting and near syncope. Called by RN for episode of bloody bowel movement. Patient has a history non-bleeding esophageal ulcers, duodenal ulcers and esophagitis, PUD, gastritis. R/o GI bleed.      -F/U Stat CBC   -STAT Type and Screen  -Discontinued Lovenox in the setting of r/o GI bleed, patient on SCDs   -Continue with Protonix, Carafate   -Patient made NPO for now   -Discussed plan with senior and Dr. Arreguin Called by RN for episode of bloody bowel movement. Patient seen and examined at bedside. Denies any current SOB, dizziness, chest pain, focal weakness.       Vital Signs Last 24 Hrs  T(C): 36.8 (01 Dec 2018 00:10), Max: 36.8 (01 Dec 2018 00:10)  T(F): 98.2 (01 Dec 2018 00:10), Max: 98.2 (01 Dec 2018 00:10)  HR: 105 (01 Dec 2018 00:10) (79 - 105)  BP: 105/60 (01 Dec 2018 00:10) (85/59 - 128/78)  BP(mean): --  RR: 18 (01 Dec 2018 00:10) (14 - 20)  SpO2: 100% (01 Dec 2018 00:10) (96% - 100%)    General: Well developed, well nourished, NAD  Neurology: A&Ox3, nonfocal  Respiratory: CTA B/L, No W/R/R  CV: RRR, +S1/S2, no murmurs, rubs or gallops  Abdominal: obese, Soft, NT, ND +BS  Extremities: No C/C/E       A/P:  Pt is a 63 y/o M with PMHx of Type 2 DM, HTN, HLD, obesity, nephrolithiasis (last stone 25 years ago), neuropathy, HE, NAFLD, who presents with vomiting and near syncope/falling out of a moving car. Admitted for vomiting and near syncope. Called by RN for episode of bloody bowel movement. Patient has a history non-bleeding esophageal ulcers, duodenal ulcers and esophagitis, PUD, gastritis. R/o GI bleed.      -F/U Stat CBC, Transfuse if <7.5   -STAT Type and Screen  -Discontinued Lovenox in the setting of r/o GI bleed, patient on SCDs   -Continue with Protonix, Carafate   -Patient made NPO for now   -Discussed plan with senior and Dr. Arreguin Called by RN for episode of bloody bowel movement. Patient seen and examined at bedside. Denies any current SOB, dizziness, chest pain, focal weakness.       Vital Signs Last 24 Hrs  T(C): 36.8 (01 Dec 2018 00:10), Max: 36.8 (01 Dec 2018 00:10)  T(F): 98.2 (01 Dec 2018 00:10), Max: 98.2 (01 Dec 2018 00:10)  HR: 105 (01 Dec 2018 00:10) (79 - 105)  BP: 105/60 (01 Dec 2018 00:10) (85/59 - 128/78)  BP(mean): --  RR: 18 (01 Dec 2018 00:10) (14 - 20)  SpO2: 100% (01 Dec 2018 00:10) (96% - 100%)    General: Well developed, well nourished, NAD  Neurology: A&Ox3, nonfocal  Respiratory: CTA B/L, No W/R/R  CV: RRR, +S1/S2, no murmurs, rubs or gallops  Abdominal: obese, Soft, NT, ND +BS  Extremities: No C/C/E       A/P:  Pt is a 61 y/o M with PMHx of Type 2 DM, HTN, HLD, obesity, nephrolithiasis (last stone 25 years ago), neuropathy, HE, NAFLD, who presents with vomiting and near syncope/falling out of a moving car. Admitted for vomiting and near syncope. Called by RN for episode of bloody bowel movement. Patient has a history non-bleeding esophageal ulcers, duodenal ulcers and esophagitis, PUD, gastritis. R/o GI bleed.      -F/U Stat CBC, Transfuse if <7.5   -STAT Type and Screen  -Discontinued Lovenox in the setting of r/o GI bleed, patient on SCDs   -Continue with Protonix, Carafate   -Patient made NPO for now   -Discussed plan with senior and Dr. Arreguin    Addendum: 5:00 AM: Notified by RN that patient had another bloody bowel movement, patient seen and examined at bedside. Denies any chest Called by RN for episode of bloody bowel movement. Patient seen and examined at bedside. Denies any current SOB, dizziness, chest pain, focal weakness.       Vital Signs Last 24 Hrs  T(C): 36.8 (01 Dec 2018 00:10), Max: 36.8 (01 Dec 2018 00:10)  T(F): 98.2 (01 Dec 2018 00:10), Max: 98.2 (01 Dec 2018 00:10)  HR: 105 (01 Dec 2018 00:10) (79 - 105)  BP: 105/60 (01 Dec 2018 00:10) (85/59 - 128/78)  BP(mean): --  RR: 18 (01 Dec 2018 00:10) (14 - 20)  SpO2: 100% (01 Dec 2018 00:10) (96% - 100%)    General: Well developed, well nourished, NAD  Neurology: A&Ox3, nonfocal  Respiratory: CTA B/L, No W/R/R  CV: RRR, +S1/S2, no murmurs, rubs or gallops  Abdominal: obese, Soft, NT, ND +BS  Extremities: No C/C/E       A/P:  Pt is a 63 y/o M with PMHx of Type 2 DM, HTN, HLD, obesity, nephrolithiasis (last stone 25 years ago), neuropathy, HE, NAFLD, who presents with vomiting and near syncope/falling out of a moving car. Admitted for vomiting and near syncope. Called by RN for episode of bloody bowel movement. Patient has a history non-bleeding esophageal ulcers, duodenal ulcers and esophagitis, PUD, gastritis. R/o GI bleed.      -F/U Stat CBC, Transfuse if <7.5   -STAT Type and Screen  -Discontinued Lovenox in the setting of r/o GI bleed, patient on SCDs   -Continue with Protonix, Carafate   -Patient made NPO for now   -Discussed plan with senior and Dr. Arreguin    Addendum: 5:00 AM: Notified by RN that patient had another bloody bowel movement, patient seen and examined at bedside. Denies any chest, SOB, dizziness.  -F/u repeat CBC from this AM   -Blood consent obtained

## 2018-12-01 NOTE — PROVIDER CONTACT NOTE (OTHER) - ACTION/TREATMENT ORDERED:
Dr Flores evaluated pt. Labs ordered. Call bell within reach. Will continue to monitor. Dr Flores evaluated pt. Labs ordered. Stool for occult blood ordered. SCD ordered and in place. Pt kept NPO except for meds. Call bell within reach. Will continue to monitor.

## 2018-12-01 NOTE — PROGRESS NOTE ADULT - PROBLEM SELECTOR PLAN 5
ammonia level 72 in ED; Pt not confused at baseline MS, no asterixis  on lactulose at home (not on rifaximin at home)  - c/w lactulose 30mL TID, titrate for soft BMs, rifaximin 550 BID  - f/u ammonia level in AM 2/2 Cirrhosis -ammonia level increased today ? 2/2 GI Bleed ; Pt not confused at baseline MS, no asterixis  on lactulose at home (not on rifaximin at home)  - c/w lactulose 30mL TID, titrate for soft BMs, rifaximin 550 BID  - f/u ammonia level in AM

## 2018-12-01 NOTE — PROGRESS NOTE ADULT - SUBJECTIVE AND OBJECTIVE BOX
Patient is a 62y old  Male who presents with a chief complaint of vomiting and near syncope (01 Dec 2018 10:45)      INTERVAL HPI:      OVERNIGHT EVENTS:    Home Medications:  Bacid (LAC) oral tablet: 1 tab(s) orally 2 times a day for  2weeks (2018 19:41)  HumaLOG 100 units/mL subcutaneous solution: 20 unit(s) subcutaneous 2 times a day(breakfast and lunch) (2018 19:41)  HumaLOG 100 units/mL subcutaneous solution: 30 unit(s) subcutaneous once a day(@dinner) (2018 19:41)  irbesartan 300 mg oral tablet: 1 tab(s) orally once a day (2018 19:41)  lactulose 10 g/15 mL oral syrup: 15 milliliter(s) orally 3 times a day (2018 19:41)  Lasix 20 mg oral tablet: 1 tab(s) orally once a day (2018 19:41)  pantoprazole 40 mg oral delayed release tablet: 1 tab(s) orally once a day (2018 19:41)  phytonadione 100 mcg oral tablet: 1 tab(s) orally once a day (2018 19:41)  potassium chloride 10 mEq oral tablet, extended release: 1 tab(s) orally once a day (2018 19:41)  pravastatin 40 mg oral tablet: 1 tab(s) orally once a day (2018 19:41)  Vitamin D3 2000 intl units oral tablet: 1 tab(s) orally once a day (2018 19:41)      MEDICATIONS  (STANDING):  atorvastatin 10 milliGRAM(s) Oral at bedtime  dextrose 5%. 1000 milliLiter(s) (50 mL/Hr) IV Continuous <Continuous>  dextrose 50% Injectable 12.5 Gram(s) IV Push once  dextrose 50% Injectable 25 Gram(s) IV Push once  dextrose 50% Injectable 25 Gram(s) IV Push once  gabapentin 800 milliGRAM(s) Oral four times a day  insulin glargine Injectable (LANTUS) 30 Unit(s) SubCutaneous at bedtime  insulin lispro (HumaLOG) corrective regimen sliding scale   SubCutaneous every 6 hours  lactulose Syrup 20 Gram(s) Oral three times a day  pantoprazole  Injectable 40 milliGRAM(s) IV Push two times a day  rifaximin 550 milliGRAM(s) Oral two times a day  sucralfate 1 Gram(s) Oral every 6 hours  ursodiol Capsule 300 milliGRAM(s) Oral three times a day    MEDICATIONS  (PRN):  dextrose 40% Gel 15 Gram(s) Oral once PRN Blood Glucose LESS THAN 70 milliGRAM(s)/deciliter  glucagon  Injectable 1 milliGRAM(s) IntraMuscular once PRN Glucose LESS THAN 70 milligrams/deciliter  ondansetron Injectable 4 milliGRAM(s) IV Push every 6 hours PRN Nausea      Allergies    codeine (Anaphylaxis)    Intolerances        REVIEW OF SYSTEMS:  CONSTITUTIONAL: No fever, No chills, No fatigue, No myalgia, No Body ache, No Weakness  EYES: No eye pain,  No visual disturbances, No discharge, NO Redness  ENMT:  No ear pain, No nose bleed, No vertigo; No sinus or throat pain, No Congestion  NECK: No pain, No stiffness  RESPIRATORY: No cough, wheezing, No  hemoptysis, No shortness of breath  CARDIOVASCULAR: No chest pain, palpitations  GASTROINTESTINAL: No abdominal or epigastric pain. No nausea, No vomiting; No diarrhea or constipation. [  ] BM  GENITOURINARY: No dysuria, No frequency, No urgency, No hematuria, or incontinence  NEUROLOGICAL: No headaches, No dizziness, No numbness, No tingling, No tremors, No weakness  EXT: No Swelling, No Pain, No Edema  SKIN:  [  ] No itching, burning, rashes, or lesions   MUSCULOSKELETAL: No joint pain or swelling; No muscle pain, No back pain, No extremity pain  PSYCHIATRIC: No depression, anxiety, mood swings or difficulty sleeping at night  PAIN SCALE: [  ] None  [  ] Other-  ROS Unable to obtain due to - [  ] Dementia  [  ] Lethargy  [  ] Sedated [  ] non verbal  REST OF REVIEW Of SYSTEM - [  ] Normal     Vital Signs Last 24 Hrs  T(C): 36.4 (01 Dec 2018 10:50), Max: 36.8 (01 Dec 2018 00:10)  T(F): 97.5 (01 Dec 2018 10:50), Max: 98.2 (01 Dec 2018 00:10)  HR: 99 (01 Dec 2018 10:50) (79 - 105)  BP: 113/67 (01 Dec 2018 10:50) (85/59 - 128/78)  BP(mean): --  RR: 17 (01 Dec 2018 10:50) (14 - 20)  SpO2: 99% (01 Dec 2018 10:50) (96% - 100%)  Finger Stick         @ : @ 07:00  --------------------------------------------------------  IN: 1250 mL / OUT: 0 mL / NET: 1250 mL     @ : @ 12:14  --------------------------------------------------------  IN: 0 mL / OUT: 300 mL / NET: -300 mL        PHYSICAL EXAM:  GENERAL:  [  ] NAD , [  ] well appearing, [  ] Agitated, [  ] Drowsy,  [  ] Lethargy, [  ] confused   HEAD:  [  ] Normal, [  ] Other  EYES:  [  ] EOMI, [  ] PERRLA, [  ] conjunctiva and sclera clear normal, [  ] Other,  [  ] Pallor,[  ] Discharge  ENMT:  [  ] Normal, [  ] Moist mucous membranes, [  ] Good dentition, [  ] No Thrush  NECK:  [  ] Supple, [  ] No JVD, [  ] Normal thyroid, [  ] Lymphadenopathy [  ] Other  CHEST/LUNG:  [  ] Clear to auscultation bilaterally, [  ] Breath Sounds equal OR Decrease,  [  ] No rales, [  ] No rhonchi  [  ]  No wheezing  HEART:  [  ] Regular rate and rhythm, [  ] tachycardia, [  ] Bradycardia,  [  ] irregular  [  ] No murmurs, No rubs, No gallops, [  ] PPM in place (Mfr:  )  ABDOMEN:  [  ] Soft, [  ] Nontender, [  ] Nondistended, [  ] No mass, [  ] Bowel sounds present, [  ] obese  NERVOUS SYSTEM:  [  ] Alert & Oriented X3, [  ] Nonfocal  [  ] Confusion  [  ] Encephalopathic [  ] Sedated [  ] Unable to assess, [  ] Other-  EXTREMITIES: [  ] 2+ Peripheral Pulses, No clubbing, No cyanosis,  [  ] edema B/L lower EXT. [  ] PVD stasis skin changes B/L Lower EXT  LYMPH: No lymphadenopathy noted  SKIN:  [  ] No rashes or lesions, [  ] Pressure Ulcers, [  ] ecchymosis, [  ] Skin Tears, [  ] Other    DIET:     LABS:                        8.4    7.41  )-----------( 89       ( 01 Dec 2018 08:58 )             24.3     01 Dec 2018 08:58    142    |  110    |  35     ----------------------------<  238    4.6     |  24     |  1.10     Ca    8.3        01 Dec 2018 08:58  Phos  2.5       01 Dec 2018 05:09  Mg     1.6       01 Dec 2018 05:09    TPro  5.0    /  Alb  2.0    /  TBili  3.7    /  DBili  x      /  AST  32     /  ALT  20     /  AlkPhos  137    01 Dec 2018 08:58    PT/INR - ( 2018 16:25 )   PT: 19.6 sec;   INR: 1.69 ratio         PTT - ( 2018 16:25 )  PTT:33.1 sec  Urinalysis Basic - ( 2018 18:30 )    Color: Yellow / Appearance: Slightly Turbid / S.015 / pH: x  Gluc: x / Ketone: Trace  / Bili: Small / Urobili: Negative   Blood: x / Protein: 25 mg/dL / Nitrite: Negative   Leuk Esterase: Trace / RBC: 6-10 /HPF / WBC 3-5   Sq Epi: x / Non Sq Epi: Occasional / Bacteria: Occasional                CARDIAC MARKERS ( 2018 23:51 )  .019 ng/mL / x     / 301 U/L / x     / 2.8 ng/mL  CARDIAC MARKERS ( 2018 16:25 )  .019 ng/mL / x     / x     / x     / x          Lipase, Serum: 70 U/L (18 @ 16:25)      RADIOLOGY & ADDITIONAL TESTS:      HEALTH ISSUES - PROBLEM Dx:  GIB (gastrointestinal bleeding): GIB (gastrointestinal bleeding)  Lactic acid blood increased: Lactic acid blood increased  Prophylactic measure: Prophylactic measure  Pedal edema: Pedal edema  Neuropathy: Neuropathy  Hypercholesteremia: Hypercholesteremia  Hypertension: Hypertension  Fatty liver disease, nonalcoholic: Fatty liver disease, nonalcoholic  Hepatic encephalopathy: Hepatic encephalopathy  Diabetes: Diabetes  Near syncope: Near syncope  Vomiting: Vomiting          Consultant(s) Notes Reviewed:  [  ] YES     Care Discussed with [X] Consultants  [  ] Patient  [  ] Family  [  ]   [  ] Social Service  [  ] RN, [  ] Physical Therapy  DVT PPX: [  ] Lovenox, [  ] S C Heparin, [  ] Coumadin, [  ] Xarelto, [  ] Eliquis, [  ] Pradaxa, [  ] IV Heparin drip, [  ] SCD [  ] Contraindication 2 to GI Bleed,[  ] Ambulation  Advanced directive: [  ] None, [  ] DNR/DNI Patient is a 62y old  Male who presents with a chief complaint of vomiting and near syncope (01 Dec 2018 10:45)      INTERVAL HPI:  Pt is a 63 y/o M with PMHx of Type 2 DM, HTN, HLD, obesity, nephrolithiasis (last stone 25 years ago), neuropathy, HE, NAFLD, Hyper ammonemia, recent Gastritis/ esophagitis on EGD & Gram variable teddy bacteremia in 10/2018  who presents with vomiting and  near syncope/falling out of a moving car. [place] and [place]", his vision was cloudy, he pulled over the car and stopped and vomited. Vomitus was "creamy white." Pt denies seeing blood in vomitus. His dtr then took over driving and Pt again felt like he needed to vomit and told dtr to pull over. He thought the car had stopped and he opened the door and fell out of the moving car. He laid on the ground for ~ 15 minutes. Pt says he possibly lost consciousness. States when he was lying on the ground felt very cold. Pt has had "terrible heartburn" for 2 days and felt chills for 2 days. Pt denies abdominal pain, CP, palp, SOB, cough, myalgias, rash, HA. Pt admits to chills, vomiting (4 episodes), diarrhea (from lactulose), dizziness, pedal edema, tingling in hands (chronic, intermittent). Pt denies recent travel, sick contacts, different eating patterns.    Pt states that early this week pt saw her Endocrinologist & started back on Metformin 500 mg daily as per Dr's recommendation.  In ED Pt's vitals were: T(C): 36.3, HR: 91, BP: 85/59, RR: 15, SpO2: 98% on RA  Labs significant for lactate 5.6, , Hgb 10.4, total bili 3.7, alk phos 183, ammonia 72, Mg 1.4. UA - trace LE, mod blood, RBC 6-10, urine glucose 1,000.   CXR - negative chest  Abd Xray - There is mild content in the colon and no overt sense of bowel obstruction.  CT head - no intracranial hemorrhage or mass effect  CT cervical - Multilevel degenerative disc disease/cervical spondylosis, with posterior osteophyte disc complex C6-7 resulting in flattening of the cervical cord. If there is a clinical concern for spinal cord injury, recommend further evaluation with MRI if there are no clinical contraindications.  EKG - NSR, 93bpm    Pt given 2L NS bolus, Zofran pantoprazole, famotidine, Carafate , Pt seen by GI Dr Morales in ER & was admitted for further Eval.    18: Pt seen, examined, Had total 4 bloody BM so far , Lactate improved, pt had CT A/P with IV Contrast. Drop in H/H. plan for, start Protonix drip,  2 u pRBC 1 u FFP & 5 mg Vit K x 1 as d/w Dr Franks -ANGELIKA, Elevated ammonia level.        OVERNIGHT EVENTS: 2 Bloody BM's     Home Medications:  Bacid (LAC) oral tablet: 1 tab(s) orally 2 times a day for  2weeks (2018 19:41)  HumaLOG 100 units/mL subcutaneous solution: 20 unit(s) subcutaneous 2 times a day(breakfast and lunch) (2018 19:41)  HumaLOG 100 units/mL subcutaneous solution: 30 unit(s) subcutaneous once a day(@dinner) (2018 19:41)  irbesartan 300 mg oral tablet: 1 tab(s) orally once a day (2018 19:41)  lactulose 10 g/15 mL oral syrup: 15 milliliter(s) orally 3 times a day (2018 19:41)  Lasix 20 mg oral tablet: 1 tab(s) orally once a day (2018 19:41)  pantoprazole 40 mg oral delayed release tablet: 1 tab(s) orally once a day (2018 19:41)  phytonadione 100 mcg oral tablet: 1 tab(s) orally once a day (2018 19:41)  potassium chloride 10 mEq oral tablet, extended release: 1 tab(s) orally once a day (2018 19:41)  pravastatin 40 mg oral tablet: 1 tab(s) orally once a day (2018 19:41)  Vitamin D3 2000 intl units oral tablet: 1 tab(s) orally once a day (2018 19:41)      MEDICATIONS  (STANDING):  atorvastatin 10 milliGRAM(s) Oral at bedtime  dextrose 5%. 1000 milliLiter(s) (50 mL/Hr) IV Continuous <Continuous>  dextrose 50% Injectable 12.5 Gram(s) IV Push once  dextrose 50% Injectable 25 Gram(s) IV Push once  dextrose 50% Injectable 25 Gram(s) IV Push once  gabapentin 800 milliGRAM(s) Oral four times a day  insulin glargine Injectable (LANTUS) 30 Unit(s) SubCutaneous at bedtime  insulin lispro (HumaLOG) corrective regimen sliding scale   SubCutaneous every 6 hours  lactulose Syrup 20 Gram(s) Oral three times a day  pantoprazole  Injectable 40 milliGRAM(s) IV Push two times a day  rifaximin 550 milliGRAM(s) Oral two times a day  sucralfate 1 Gram(s) Oral every 6 hours  ursodiol Capsule 300 milliGRAM(s) Oral three times a day    MEDICATIONS  (PRN):  dextrose 40% Gel 15 Gram(s) Oral once PRN Blood Glucose LESS THAN 70 milliGRAM(s)/deciliter  glucagon  Injectable 1 milliGRAM(s) IntraMuscular once PRN Glucose LESS THAN 70 milligrams/deciliter  ondansetron Injectable 4 milliGRAM(s) IV Push every 6 hours PRN Nausea      Allergies    codeine (Anaphylaxis)    Intolerances        REVIEW OF SYSTEMS: Tired  CONSTITUTIONAL: No fever, No chills, No fatigue, No myalgia, No Body ache, C/O  Weakness  EYES: No eye pain,  No visual disturbances, No discharge, NO Redness  ENMT:  No ear pain, No nose bleed, No vertigo; No sinus or throat pain, No Congestion  NECK: No pain, No stiffness  RESPIRATORY: No cough, wheezing, No  hemoptysis, No shortness of breath  CARDIOVASCULAR: No chest pain, palpitations  GASTROINTESTINAL: No abdominal or epigastric pain. Mild nausea, No vomiting; No diarrhea or constipation. [ x ] BM-4 bloody BM  GENITOURINARY: No dysuria, No frequency, No urgency, No hematuria, or incontinence  NEUROLOGICAL: No headaches, No dizziness, No numbness, No tingling, No tremors, No weakness  EXT: No Swelling, No Pain, No Edema  SKIN:  [x  ] No itching, burning, rashes, or lesions   MUSCULOSKELETAL: No joint pain or swelling; No muscle pain, No back pain, No extremity pain  PSYCHIATRIC: No depression, anxiety, mood swings or difficulty sleeping at night  PAIN SCALE: [x  ] None  [  ] Other-  ROS Unable to obtain due to - [  ] Dementia  [  ] Lethargy  [  ] Sedated [  ] non verbal  REST OF REVIEW Of SYSTEM - [  x] Normal     Vital Signs Last 24 Hrs  T(C): 36.4 (01 Dec 2018 10:50), Max: 36.8 (01 Dec 2018 00:10)  T(F): 97.5 (01 Dec 2018 10:50), Max: 98.2 (01 Dec 2018 00:10)  HR: 99 (01 Dec 2018 10:50) (79 - 105)  BP: 113/67 (01 Dec 2018 10:50) (85/59 - 128/78)  BP(mean): --  RR: 17 (01 Dec 2018 10:50) (14 - 20)  SpO2: 99% (01 Dec 2018 10:50) (96% - 100%)  Finger Stick        11-30 @ 07:  -   @ 07:00  --------------------------------------------------------  IN: 1250 mL / OUT: 0 mL / NET: 1250 mL     @ 07:  -   @ 12:14  --------------------------------------------------------  IN: 0 mL / OUT: 300 mL / NET: -300 mL        PHYSICAL EXAM:  GENERAL:  [x  ] NAD , [ x ] well appearing, [  ] Agitated, [  ] Drowsy,  [  ] Lethargy, [  ] confused   HEAD:  [ x ] Normal, [  ] Other  EYES:  [ x ] EOMI, [x  ] PERRLA, [ x ] conjunctiva and sclera clear normal, [  ] Other,  [ x ] Pallor,[  ] Discharge  ENMT:  [x  ] Normal, [x  ] Moist mucous membranes, [  ] Good dentition, [  ] No Thrush  NECK:  [x  ] Supple, [ x ] No JVD, [ x ] Normal thyroid, [  ] Lymphadenopathy [  ] Other  CHEST/LUNG:  [x  ] Clear to auscultation bilaterally, [ x ] Breath Sounds equal OR Decrease,  [x  ] No rales, [ x ] No rhonchi  [ x ]  No wheezing  HEART:  [x  ] Regular rate and rhythm, [  ] tachycardia, [  ] Bradycardia,  [  ] irregular  [x  ] No murmurs, No rubs, No gallops, [  ] PPM in place (Mfr:  )  ABDOMEN:  [ x ] Soft, [  ] Nontender, [ x ] Nondistended, [x  ] No mass, [ x ] Bowel sounds present, [ x ] obese  NERVOUS SYSTEM:  [ x ] Alert & Oriented X3, [x ] Nonfocal  [  ] Confusion  [  ] Encephalopathic [  ] Sedated [  ] Unable to assess, [  ] Other-  EXTREMITIES: [ x ] 2+ Peripheral Pulses, No clubbing, No cyanosis,  [  ] edema B/L lower EXT. [  ] PVD stasis skin changes B/L Lower EXT  LYMPH: No lymphadenopathy noted  SKIN:  [x  ] No rashes or lesions, [  ] Pressure Ulcers, [  ] ecchymosis, [  ] Skin Tears, [  ] Other    DIET: NPO with Ice chips    LABS:                      7.0    x     )-----------( x        ( 01 Dec 2018 14:38 )             20.7                           8.4    7.41  )-----------( 89       ( 01 Dec 2018 08:58 )             24.3     01 Dec 2018 08:58    142    |  110    |  35     ----------------------------<  238    4.6     |  24     |  1.10     Ca    8.3        01 Dec 2018 08:58  Phos  2.5       01 Dec 2018 05:09  Mg     1.6       01 Dec 2018 05:09    TPro  5.0    /  Alb  2.0    /  TBili  3.7    /  DBili  x      /  AST  32     /  ALT  20     /  AlkPhos  137    01 Dec 2018 08:58    PT/INR - ( 2018 16:25 )   PT: 19.6 sec;   INR: 1.69 ratio         PTT - ( 2018 16:25 )  PTT:33.1 sec  Urinalysis Basic - ( 2018 18:30 )    Color: Yellow / Appearance: Slightly Turbid / S.015 / pH: x  Gluc: x / Ketone: Trace  / Bili: Small / Urobili: Negative   Blood: x / Protein: 25 mg/dL / Nitrite: Negative   Leuk Esterase: Trace / RBC: 6-10 /HPF / WBC 3-5   Sq Epi: x / Non Sq Epi: Occasional / Bacteria: Occasional                CARDIAC MARKERS ( 2018 23:51 )  .019 ng/mL / x     / 301 U/L / x     / 2.8 ng/mL  CARDIAC MARKERS ( 2018 16:25 )  .019 ng/mL / x     / x     / x     / x          Lipase, Serum: 70 U/L (18 @ 16:25)      RADIOLOGY & ADDITIONAL TESTS:      HEALTH ISSUES - PROBLEM Dx:  GIB (gastrointestinal bleeding): GIB (gastrointestinal bleeding)  Lactic acid blood increased: Lactic acid blood increased  Prophylactic measure: Prophylactic measure  Pedal edema: Pedal edema  Neuropathy: Neuropathy  Hypercholesteremia: Hypercholesteremia  Hypertension: Hypertension  Fatty liver disease, nonalcoholic: Fatty liver disease, nonalcoholic  Hepatic encephalopathy: Hepatic encephalopathy  Diabetes: Diabetes  Near syncope: Near syncope  Vomiting: Vomiting          Consultant(s) Notes Reviewed:  [ x ] YES     Care Discussed with [X] Consultants  [x  ] Patient  [ x ] Family -Son [  ]   [  ] Social Service  [ x ] RN, [  ] Physical Therapy  DVT PPX: [  ] Lovenox, [  ] S C Heparin, [  ] Coumadin, [  ] Xarelto, [  ] Eliquis, [  ] Pradaxa, [  ] IV Heparin drip, [x  ] SCD [  ] Contraindication 2 to GI Bleed,[  ] Ambulation  Advanced directive: [ x ] None, [  ] DNR/DNI Patient is a 62y old  Male who presents with a chief complaint of vomiting and near syncope (01 Dec 2018 10:45)      INTERVAL HPI:  Pt is a 61 y/o M with PMHx of Type 2 DM, HTN, HLD, obesity, nephrolithiasis (last stone 25 years ago), neuropathy, HE, NAFLD, Hyper ammonemia, recent Gastritis/ esophagitis on EGD & Gram variable teddy bacteremia in 10/2018  who presents with vomiting and  near syncope/falling out of a moving car. [place] and [place]", his vision was cloudy, he pulled over the car and stopped and vomited. Vomitus was "creamy white." Pt denies seeing blood in vomitus. His dtr then took over driving and Pt again felt like he needed to vomit and told dtr to pull over. He thought the car had stopped and he opened the door and fell out of the moving car. He laid on the ground for ~ 15 minutes. Pt says he possibly lost consciousness. States when he was lying on the ground felt very cold. Pt has had "terrible heartburn" for 2 days and felt chills for 2 days. Pt denies abdominal pain, CP, palp, SOB, cough, myalgias, rash, HA. Pt admits to chills, vomiting (4 episodes), diarrhea (from lactulose), dizziness, pedal edema, tingling in hands (chronic, intermittent). Pt denies recent travel, sick contacts, different eating patterns.    Pt states that early this week pt saw her Endocrinologist & started back on Metformin 500 mg daily as per Dr's recommendation.  In ED Pt's vitals were: T(C): 36.3, HR: 91, BP: 85/59, RR: 15, SpO2: 98% on RA  Labs significant for lactate 5.6, , Hgb 10.4, total bili 3.7, alk phos 183, ammonia 72, Mg 1.4. UA - trace LE, mod blood, RBC 6-10, urine glucose 1,000.   CXR - negative chest  Abd Xray - There is mild content in the colon and no overt sense of bowel obstruction.  CT head - no intracranial hemorrhage or mass effect  CT cervical - Multilevel degenerative disc disease/cervical spondylosis, with posterior osteophyte disc complex C6-7 resulting in flattening of the cervical cord. If there is a clinical concern for spinal cord injury, recommend further evaluation with MRI if there are no clinical contraindications.  EKG - NSR, 93bpm    Pt given 2L NS bolus, Zofran pantoprazole, famotidine, Carafate , Pt seen by GI Dr Morales in ER & was admitted for further Eval.    18: Pt seen, examined, Had total 4 bloody BM so far , Lactate improved, pt had CT A/P with IV Contrast. Drop in H/H. plan for, start Protonix drip,  2 u pRBC 1 u FFP & 5 mg Vit K x 1 as d/w Dr Franks -ANGELIKA, Elevated ammonia level. Coagulopathy, Improving Lactate.        OVERNIGHT EVENTS: 2 Bloody BM's     Home Medications:  Bacid (LAC) oral tablet: 1 tab(s) orally 2 times a day for  2weeks (2018 19:41)  HumaLOG 100 units/mL subcutaneous solution: 20 unit(s) subcutaneous 2 times a day(breakfast and lunch) (2018 19:41)  HumaLOG 100 units/mL subcutaneous solution: 30 unit(s) subcutaneous once a day(@dinner) (2018 19:41)  irbesartan 300 mg oral tablet: 1 tab(s) orally once a day (2018 19:41)  lactulose 10 g/15 mL oral syrup: 15 milliliter(s) orally 3 times a day (2018 19:41)  Lasix 20 mg oral tablet: 1 tab(s) orally once a day (2018 19:41)  pantoprazole 40 mg oral delayed release tablet: 1 tab(s) orally once a day (2018 19:41)  phytonadione 100 mcg oral tablet: 1 tab(s) orally once a day (2018 19:41)  potassium chloride 10 mEq oral tablet, extended release: 1 tab(s) orally once a day (2018 19:41)  pravastatin 40 mg oral tablet: 1 tab(s) orally once a day (2018 19:41)  Vitamin D3 2000 intl units oral tablet: 1 tab(s) orally once a day (2018 19:41)      MEDICATIONS  (STANDING):  atorvastatin 10 milliGRAM(s) Oral at bedtime  dextrose 5%. 1000 milliLiter(s) (50 mL/Hr) IV Continuous <Continuous>  dextrose 50% Injectable 12.5 Gram(s) IV Push once  dextrose 50% Injectable 25 Gram(s) IV Push once  dextrose 50% Injectable 25 Gram(s) IV Push once  gabapentin 800 milliGRAM(s) Oral four times a day  insulin glargine Injectable (LANTUS) 30 Unit(s) SubCutaneous at bedtime  insulin lispro (HumaLOG) corrective regimen sliding scale   SubCutaneous every 6 hours  lactulose Syrup 20 Gram(s) Oral three times a day  pantoprazole  Injectable 40 milliGRAM(s) IV Push two times a day  rifaximin 550 milliGRAM(s) Oral two times a day  sucralfate 1 Gram(s) Oral every 6 hours  ursodiol Capsule 300 milliGRAM(s) Oral three times a day    MEDICATIONS  (PRN):  dextrose 40% Gel 15 Gram(s) Oral once PRN Blood Glucose LESS THAN 70 milliGRAM(s)/deciliter  glucagon  Injectable 1 milliGRAM(s) IntraMuscular once PRN Glucose LESS THAN 70 milligrams/deciliter  ondansetron Injectable 4 milliGRAM(s) IV Push every 6 hours PRN Nausea      Allergies    codeine (Anaphylaxis)    Intolerances        REVIEW OF SYSTEMS: Tired  CONSTITUTIONAL: No fever, No chills, No fatigue, No myalgia, No Body ache, C/O  Weakness  EYES: No eye pain,  No visual disturbances, No discharge, NO Redness  ENMT:  No ear pain, No nose bleed, No vertigo; No sinus or throat pain, No Congestion  NECK: No pain, No stiffness  RESPIRATORY: No cough, wheezing, No  hemoptysis, No shortness of breath  CARDIOVASCULAR: No chest pain, palpitations  GASTROINTESTINAL: No abdominal or epigastric pain. Mild nausea, No vomiting; No diarrhea or constipation. [ x ] BM-4 bloody BM  GENITOURINARY: No dysuria, No frequency, No urgency, No hematuria, or incontinence  NEUROLOGICAL: No headaches, No dizziness, No numbness, No tingling, No tremors, No weakness  EXT: No Swelling, No Pain, No Edema  SKIN:  [x  ] No itching, burning, rashes, or lesions   MUSCULOSKELETAL: No joint pain or swelling; No muscle pain, No back pain, No extremity pain  PSYCHIATRIC: No depression, anxiety, mood swings or difficulty sleeping at night  PAIN SCALE: [x  ] None  [  ] Other-  ROS Unable to obtain due to - [  ] Dementia  [  ] Lethargy  [  ] Sedated [  ] non verbal  REST OF REVIEW Of SYSTEM - [  x] Normal     Vital Signs Last 24 Hrs  T(C): 36.4 (01 Dec 2018 10:50), Max: 36.8 (01 Dec 2018 00:10)  T(F): 97.5 (01 Dec 2018 10:50), Max: 98.2 (01 Dec 2018 00:10)  HR: 99 (01 Dec 2018 10:50) (79 - 105)  BP: 113/67 (01 Dec 2018 10:50) (85/59 - 128/78)  BP(mean): --  RR: 17 (01 Dec 2018 10:50) (14 - 20)  SpO2: 99% (01 Dec 2018 10:50) (96% - 100%)  Finger Stick        11-30 @ 07:  -   @ 07:00  --------------------------------------------------------  IN: 1250 mL / OUT: 0 mL / NET: 1250 mL    12 @ 07: @ 12:14  --------------------------------------------------------  IN: 0 mL / OUT: 300 mL / NET: -300 mL        PHYSICAL EXAM:  GENERAL:  [x  ] NAD , [ x ] well appearing, [  ] Agitated, [  ] Drowsy,  [  ] Lethargy, [  ] confused   HEAD:  [ x ] Normal, [  ] Other  EYES:  [ x ] EOMI, [x  ] PERRLA, [ x ] conjunctiva and sclera clear normal, [  ] Other,  [ x ] Pallor,[  ] Discharge  ENMT:  [x  ] Normal, [x  ] Moist mucous membranes, [  ] Good dentition, [  ] No Thrush  NECK:  [x  ] Supple, [ x ] No JVD, [ x ] Normal thyroid, [  ] Lymphadenopathy [  ] Other  CHEST/LUNG:  [x  ] Clear to auscultation bilaterally, [ x ] Breath Sounds equal OR Decrease,  [x  ] No rales, [ x ] No rhonchi  [ x ]  No wheezing  HEART:  [x  ] Regular rate and rhythm, [  ] tachycardia, [  ] Bradycardia,  [  ] irregular  [x  ] No murmurs, No rubs, No gallops, [  ] PPM in place (Mfr:  )  ABDOMEN:  [ x ] Soft, [  ] Nontender, [ x ] Nondistended, [x  ] No mass, [ x ] Bowel sounds present, [ x ] obese  NERVOUS SYSTEM:  [ x ] Alert & Oriented X3, [x ] Nonfocal  [  ] Confusion  [  ] Encephalopathic [  ] Sedated [  ] Unable to assess, [  ] Other-  EXTREMITIES: [ x ] 2+ Peripheral Pulses, No clubbing, No cyanosis,  [  ] edema B/L lower EXT. [  ] PVD stasis skin changes B/L Lower EXT  LYMPH: No lymphadenopathy noted  SKIN:  [x  ] No rashes or lesions, [  ] Pressure Ulcers, [  ] ecchymosis, [  ] Skin Tears, [  ] Other    DIET: NPO with Ice chips    LABS:                      7.0    x     )-----------( x        ( 01 Dec 2018 14:38 )             20.7                           8.4    7.41  )-----------( 89       ( 01 Dec 2018 08:58 )             24.3     01 Dec 2018 08:58    142    |  110    |  35     ----------------------------<  238    4.6     |  24     |  1.10     Ca    8.3        01 Dec 2018 08:58  Phos  2.5       01 Dec 2018 05:09  Mg     1.6       01 Dec 2018 05:09    TPro  5.0    /  Alb  2.0    /  TBili  3.7    /  DBili  x      /  AST  32     /  ALT  20     /  AlkPhos  137    01 Dec 2018 08:58    PT/INR - ( 2018 16:25 )   PT: 19.6 sec;   INR: 1.69 ratio         PTT - ( 2018 16:25 )  PTT:33.1 sec  Urinalysis Basic - ( 2018 18:30 )    Color: Yellow / Appearance: Slightly Turbid / S.015 / pH: x  Gluc: x / Ketone: Trace  / Bili: Small / Urobili: Negative   Blood: x / Protein: 25 mg/dL / Nitrite: Negative   Leuk Esterase: Trace / RBC: 6-10 /HPF / WBC 3-5   Sq Epi: x / Non Sq Epi: Occasional / Bacteria: Occasional                CARDIAC MARKERS ( 2018 23:51 )  .019 ng/mL / x     / 301 U/L / x     / 2.8 ng/mL  CARDIAC MARKERS ( 2018 16:25 )  .019 ng/mL / x     / x     / x     / x          Lipase, Serum: 70 U/L (18 @ 16:25)      RADIOLOGY & ADDITIONAL TESTS:  < from: CT Angio Abdomen and Pelvis w/ IV Cont (18 @ 10:46) >    EXAM:  CT ANGIO ABD PELV (W)AW IC                            PROCEDURE DATE:  2018          INTERPRETATION:   Cirrhosis, coagulopathic. Evaluate for GI bleed.    CTA abdomen and pelvis prior CT abdomen 10/17/2018  190 cc Omnipaque 350 injected intravenously  Axial images augmented by coronal sagittal reformats 3-D MIP images.    Limited. Suboptimal arterial phase imaging secondary to poor bolus.  No definite evidence of active arterial extravasation. No pooling noted   on the venous phase.  Multiple upper abdominal and retroperitoneal varices similar to prior.  Status post cholecystectomy. Stable prominence of the common duct   attributed postcholecystectomy change. Liver shrunken suggestive of   cirrhosis. Pancreas spleen unremarkable.  No renal abnormalities.  No ascites.  Colonic diverticula without diverticulitis or other acute bowel   inflammation. No bowel obstruction.  Normal size prostate with calcination. Bladder not remarkable.  No acute skeletal abnormality.    Impression:    Limited. Suboptimal arterial phase.  No obvious active gastrointestinal hemorrhage. If warranted scintigraphy   might be considered for additional evaluation.  Additional findings as discussed        < end of copied text >      HEALTH ISSUES - PROBLEM Dx:  GIB (gastrointestinal bleeding): GIB (gastrointestinal bleeding)  Lactic acid blood increased: Lactic acid blood increased  Prophylactic measure: Prophylactic measure  Pedal edema: Pedal edema  Neuropathy: Neuropathy  Hypercholesteremia: Hypercholesteremia  Hypertension: Hypertension  Fatty liver disease, nonalcoholic: Fatty liver disease, nonalcoholic  Hepatic encephalopathy: Hepatic encephalopathy  Diabetes: Diabetes  Near syncope: Near syncope  Vomiting: Vomiting      Consultant(s) Notes Reviewed:  [ x ] YES     Care Discussed with [X] Consultants  [x  ] Patient  [ x ] Family -Son [  ]   [  ] Social Service  [ x ] RN, [  ] Physical Therapy  DVT PPX: [  ] Lovenox, [  ] S C Heparin, [  ] Coumadin, [  ] Xarelto, [  ] Eliquis, [  ] Pradaxa, [  ] IV Heparin drip, [x  ] SCD [  ] Contraindication 2 to GI Bleed,[  ] Ambulation  Advanced directive: [ x ] None, [  ] DNR/DNI

## 2018-12-01 NOTE — CHART NOTE - NSCHARTNOTEFT_GEN_A_CORE
Called by RN for hypotension to 90/46. Patient seen and examined at bedside. Patient denies light headedness, dizziness, shortness of breath, chest pain/palpitations, abdominal pain, nausea/vomiting. Patient endorses fatigue but states it has improved compared to last night. Denies additional bloody bowel movements since this AM as documented in addendum of previous chart note.    Vital Signs (24 Hrs):  T(C): 36.5 (18 @ 08:55), Max: 36.8 (18 @ 00:10)  HR: 97 (18 @ 08:55) (79 - 105)  BP: 101/64 (18 @ 08:55) (85/59 - 128/78)  RR: 18 (18 @ 08:55) (14 - 20)  SpO2: 97% (18 @ 08:55) (96% - 100%)  Wt(kg): --  Daily Height in cm: 190.5 (2018 16:04)    Daily Weight in k.7 (01 Dec 2018 04:26)    I&O's Summary    2018 07:01  -  01 Dec 2018 07:00  --------------------------------------------------------  IN: 1250 mL / OUT: 0 mL / NET: 1250 mL    Physical Exam:  General: NAD  HEENT: PERRLA, EOMI bl, moist mucous membranes   Neurology: A&Ox3, nonfocal, CN II-XII grossly intact  Respiratory: CTA B/L, No W/R/R  CV: RRR, +S1/S2, no murmurs, rubs or gallops  Abdominal: Soft, NT, ND +BSx4  Extremities: No C/C/E  : bloody urine visualized in urinal at bedside    Assessment/Plan:    Pt is a 63 y/o M with PMHx of Type 2 DM, HTN, HLD, obesity, nephrolithiasis (last stone 25 years ago), neuropathy, HE, NAFLD, who presents with vomiting and near syncope/falling out of a moving car and admitted for vomiting and near syncope with 2 episodes of bloody bowel movements found to be hypotensive likely secondary to GI bleed.    - Repeat BP increased to 101/64, patient is s/p 6L of fluid and states his blood pressure typically runs in the 110s/60s.  - Dr. Arreguin contacted and requested repeat H/H and lactate. If H/H is stable, will escort to radiology for CTA abdomen/pelvis to rule out GI bleed.  - Patient currently NPO - low dose sliding scale accuchecks switched to q6 instead of pre-meal.   - RN to call with changes.    Dr. Clinton PGY1 [id number] Called by RN for hypotension to 90/46. Patient seen and examined at bedside. Patient denies light headedness, dizziness, shortness of breath, chest pain/palpitations, abdominal pain, nausea/vomiting. Patient endorses fatigue but states it has improved compared to last night. Denies additional bloody bowel movements since this AM as documented in addendum of previous chart note.    Vital Signs (24 Hrs):  T(C): 36.5 (18 @ 08:55), Max: 36.8 (18 @ 00:10)  HR: 97 (18 @ 08:55) (79 - 105)  BP: 101/64 (18 @ 08:55) (85/59 - 128/78)  RR: 18 (18 @ 08:55) (14 - 20)  SpO2: 97% (18 @ 08:55) (96% - 100%)  Wt(kg): --  Daily Height in cm: 190.5 (2018 16:04)    Daily Weight in k.7 (01 Dec 2018 04:26)    I&O's Summary    2018 07:01  -  01 Dec 2018 07:00  --------------------------------------------------------  IN: 1250 mL / OUT: 0 mL / NET: 1250 mL    Physical Exam:  General: NAD  HEENT: PERRLA, EOMI bl, moist mucous membranes   Neurology: A&Ox3, nonfocal, CN II-XII grossly intact  Respiratory: CTA B/L, No W/R/R  CV: RRR, +S1/S2, no murmurs, rubs or gallops  Abdominal: Soft, NT, ND +BSx4  Extremities: No C/C/E  : bloody urine visualized in urinal at bedside    Assessment/Plan:    Pt is a 61 y/o M with PMHx of Type 2 DM, HTN, HLD, obesity, nephrolithiasis (last stone 25 years ago), neuropathy, HE, NAFLD, who presents with vomiting and near syncope/falling out of a moving car and admitted for vomiting and near syncope with 2 episodes of bloody bowel movements found to be hypotensive likely secondary to GI bleed.    - Repeat BP increased to 101/64, patient is s/p 6L of fluid and states his blood pressure typically runs in the 110s/60s.  - Dr. Arreguin contacted and requested repeat H/H and lactate. If H/H is stable, will escort to radiology for CTA abdomen/pelvis to rule out GI bleed.  - Patient currently NPO - low dose sliding scale accuchecks switched to q6 instead of pre-meal.   - Patient case discussed with ICU OLY Ag who is aware of patient's current status.   - RN to call with changes.    Dr. Clinton PGY1 [id number] Called by RN for hypotension to 90/46. Patient seen and examined at bedside. Patient denies light headedness, dizziness, shortness of breath, chest pain/palpitations, abdominal pain, nausea/vomiting. Patient endorses fatigue but states it has improved compared to last night. Denies additional bloody bowel movements since this AM as documented in addendum of previous chart note.    Vital Signs (24 Hrs):  T(C): 36.5 (18 @ 08:55), Max: 36.8 (18 @ 00:10)  HR: 97 (18 @ 08:55) (79 - 105)  BP: 101/64 (18 @ 08:55) (85/59 - 128/78)  RR: 18 (18 @ 08:55) (14 - 20)  SpO2: 97% (18 @ 08:55) (96% - 100%)  Wt(kg): --  Daily Height in cm: 190.5 (2018 16:04)    Daily Weight in k.7 (01 Dec 2018 04:26)    I&O's Summary    2018 07:01  -  01 Dec 2018 07:00  --------------------------------------------------------  IN: 1250 mL / OUT: 0 mL / NET: 1250 mL    Physical Exam:  General: NAD  HEENT: PERRLA, EOMI bl, moist mucous membranes   Neurology: A&Ox3, nonfocal, CN II-XII grossly intact  Respiratory: CTA B/L, No W/R/R  CV: RRR, +S1/S2, no murmurs, rubs or gallops  Abdominal: Soft, NT, ND +BSx4  Extremities: No C/C/E  : bloody urine visualized in urinal at bedside    Assessment/Plan:    Pt is a 61 y/o M with PMHx of Type 2 DM, HTN, HLD, obesity, nephrolithiasis (last stone 25 years ago), neuropathy, HE, NAFLD, who presents with vomiting and near syncope/falling out of a moving car and admitted for vomiting and near syncope with 2 episodes of bloody bowel movements found to be hypotensive likely secondary to GI bleed.    - Repeat BP increased to 101/64, patient is s/p 6L of fluid and states his blood pressure typically runs in the 110s/60s.  - Dr. Arreguin contacted and requested repeat H/H and lactate. If H/H is stable, will escort to radiology for CTA abdomen/pelvis to rule out GI bleed.  - Continue to trend H/H q6.  - Patient currently NPO - low dose sliding scale accuchecks switched to q6 instead of pre-meal.   - Patient case discussed with ICU OLY Ag who is aware of patient's current status.   - RN to call with changes.    Dr. Clinton PGY1 [id number]

## 2018-12-01 NOTE — PROGRESS NOTE ADULT - ATTENDING COMMENTS
Patient assessed independently from above    62M PMH DM2, neuropathy, HTN, HLD, JEAN, hepatic encephalopathy, PUD, now admitted with likely upper GI bleed with acute blood loss anemia and hypotension.     He is somnolent and has orthostatic changes.   He is denying any chest pain, SOB, recent fever, chills    Bedside ECHO shows hyperdynamic LV, no septal flattening, small and collapsible IVC    GI bleed likely due to PUD    Will transfuse 2 PRBCs, 1 FFP and 1 Plt ASAP  Continue protonix drip  Avoid AC, antiplatelets  Repeat labs after blood product transfusion  Hold antihypertensives, diuretics    He will need EGD after resuscitation - d/w GI    Patient is critically ill, 35mins spent Patient assessed independently from above    62M PMH DM2, neuropathy, HTN, HLD, JEAN, hepatic encephalopathy, PUD, now admitted with likely upper GI bleed with acute blood loss anemia and hypotension.     He is somnolent and has orthostatic changes.   He is denying any chest pain, SOB, recent fever, chills    Bedside ECHO shows hyperdynamic LV, no septal flattening, small and collapsible IVC    GI bleed likely due to PUD    Will transfuse 2 PRBCs, 1 FFP and 1 Plt ASAP  Continue protonix drip  Avoid AC, antiplatelets  Repeat labs after blood product transfusion  Hold antihypertensives, diuretics    He will need EGD after resuscitation - d/w GI    Patient is critically ill, 35mins spent    Addendum (19:00): patient had 3 BMs with dark blood since coming to the ICU, he received 2 PRBCs, 1 FFP and is about to receive 1 Plt now, he is more awake and SBP is 115. I called GI and requested to perform EGD tonight. Will get CBC, PT, INR at 19:30 and give 1 PRBC and 1 FFP after, more blood products based on clinical course and labs. Patient updated.

## 2018-12-01 NOTE — PROGRESS NOTE ADULT - PROBLEM SELECTOR PLAN 3
initial 5.6, repeat after 2L NS boluses 4.0; NO leukocytosis, afebrile   Likely sec to re starting Metformin 500mg  daily since Tuesday -3d ago (left over medications), per pharmacy Pt has not picked up Rx for metformin in 3 months  - f/u RVP- neg  - f/u BCx, UCx, IV fluids   - give another 2L NS bolus, repeat lactate initial 5.6, repeat after  NS boluses improved ; NO leukocytosis, afebrile   Likely sec to re starting Metformin / hypotension   -  RVP- neg  - f/u BCx, UCx, IV fluids   -Pt got IV Fluids

## 2018-12-01 NOTE — PROGRESS NOTE ADULT - SUBJECTIVE AND OBJECTIVE BOX
HISTORY  HPI:  Pt is a 63 y/o M with PMHx of Type 2 DM, HTN, HLD, obesity, nephrolithiasis (last stone 25 years ago), neuropathy, HE, NAFLD, Hyper ammonemia, recent Gastritis/ esophagitis on EGD & Gram variable teddy bacteremia in 10/2018  who presents with vomiting and  near syncope/falling out of a moving car. Pt states that [place] and felt "off", his vision was cloudy, he pulled over the car and stopped and vomited. Vomitus was "creamy white." Pt denies seeing blood in vomitus. His dtr then took over driving and Pt again felt like he needed to vomit and told dtr to pull over. He thought the car had stopped and he opened the door and fell out of the moving car. He laid on the ground for ~ 15 minutes. Pt says he possibly lost consciousness. States when he was lying on the ground felt very cold. Pt has had "terrible heartburn" for 2 days and felt chills for 2 days. Pt denies abdominal pain, CP, palp, SOB, cough, myalgias, rash, HA. Pt admits to chills, vomiting (4 episodes), diarrhea (from lactulose), dizziness, pedal edema, tingling in hands (chronic, intermittent). Pt denies recent travel, sick contacts, different eating patterns.    Pt states that early this week pt saw her Endocrinologist & started back on Metformin 500 mg daily as per Dr's recommendation.  In ED Pt's vitals were: T(C): 36.3, HR: 91, BP: 85/59, RR: 15, SpO2: 98% on RA  Labs significant for lactate 5.6, , Hgb 10.4, total bili 3.7, alk phos 183, ammonia 72, Mg 1.4. UA - trace LE, mod blood, RBC 6-10, urine glucose 1,000.   CXR - negative chest  Abd Xray - There is mild content in the colon and no overt sense of bowel obstruction.  CT head - no intracranial hemorrhage or mass effect  CT cervical - Multilevel degenerative disc disease/cervical spondylosis, with posterior osteophyte disc complex C6-7 resulting in flattening of the cervical cord. If there is a clinical concern for spinal cord injury, recommend further evaluation with MRI if there are no clinical contraindications.  EKG - NSR, 93bpm    Pt given 2L NS bolus, Zofran pantoprazole, famotidine, Carafate , Pt seen by GI Dr Morales in ER & was admitted for further Eval. (30 Nov 2018 18:20)        24 HOUR EVENTS:      PAST MEDICAL & SURGICAL HISTORY:  GERD with esophagitis: Gastritis &amp; Non Bleeding Ulcers  Hepatic encephalopathy  Obesity  Fatty liver disease, nonalcoholic  Renal stones: 25 years ago  Hypertension  Neuropathy  Hypercholesteremia  Diabetes  S/P cholecystectomy    Allergies    codeine (Anaphylaxis)    Intolerances    Home Medications:  Bacid (LAC) oral tablet: 1 tab(s) orally 2 times a day for  2weeks (30 Nov 2018 19:41)  HumaLOG 100 units/mL subcutaneous solution: 20 unit(s) subcutaneous 2 times a day(breakfast and lunch) (30 Nov 2018 19:41)  HumaLOG 100 units/mL subcutaneous solution: 30 unit(s) subcutaneous once a day(@dinner) (30 Nov 2018 19:41)  irbesartan 300 mg oral tablet: 1 tab(s) orally once a day (30 Nov 2018 19:41)  lactulose 10 g/15 mL oral syrup: 15 milliliter(s) orally 3 times a day (30 Nov 2018 19:41)  Lasix 20 mg oral tablet: 1 tab(s) orally once a day (30 Nov 2018 19:41)  pantoprazole 40 mg oral delayed release tablet: 1 tab(s) orally once a day (30 Nov 2018 19:41)  phytonadione 100 mcg oral tablet: 1 tab(s) orally once a day (30 Nov 2018 19:41)  potassium chloride 10 mEq oral tablet, extended release: 1 tab(s) orally once a day (30 Nov 2018 19:41)  pravastatin 40 mg oral tablet: 1 tab(s) orally once a day (30 Nov 2018 19:41)  Vitamin D3 2000 intl units oral tablet: 1 tab(s) orally once a day (30 Nov 2018 19:41)      SUBJECTIVE/ROS:  [x ] A ten-point review of systems was otherwise negative except as noted.  [ ] Due to altered mental status/intubation, subjective information were not able to be obtained from the patient. History was obtained, to the extent possible, from review of the chart and collateral sources of information.    NEURO  RASS:  0   GCS: 15  Exam: lethargic. able to make needs known. No focal defiicts   Meds: gabapentin 800 milliGRAM(s) Oral four times a day  ondansetron Injectable 4 milliGRAM(s) IV Push every 6 hours PRN Nausea    [x] Adequacy of sedation and pain control has been assessed and adjusted      RESPIRATORY  RR: 18 (12-01-18 @ 15:40) (14 - 18)  SpO2: 96% (12-01-18 @ 15:40) (96% - 100%)  Wt(kg): --  Exam: unlabored, clear to auscultation bilaterally  Mechanical Ventilation: NA  ABG - ( 01 Dec 2018 08:58 )  pH: x     /  pCO2: x     /  pO2: x     / HCO3: x     / Base Excess: x     /  SaO2: x       Lactate: 2.1      [NA ] Extubation Readiness Assessed  Meds:     CARDIOVASCULAR  HR: 98 (12-01-18 @ 15:40) (79 - 105)  BP: 94/54 (12-01-18 @ 15:40) (90/46 - 128/78)  BP(mean): --  ABP: --  ABP(mean): --  Wt(kg): --  CVP(cm H2O): --    Lactate, Blood: 2.1 mmol/L (12-01 @ 08:58)    Exam: S1S2  Cardiac Rhythm: sinus tachycardia  Perfusion     [ ]Adequate   [ x]Inadequate  Mentation   [ ]Normal       [ x]Reduced  Extremities  [ ]Warm         [x ]Cool  Volume Status [ ]Hypervolemic [ ]Euvolemic [x ]Hypovolemic  Meds: furosemide   Injectable 20 milliGRAM(s) IV Push once    GI/NUTRITION  Exam: Obese, nontender, nondistended, no guarding or rebound  Diet: NPO  Meds: lactulose Syrup 20 Gram(s) Oral three times a day  pantoprazole Infusion 8 mG/Hr IV Continuous <Continuous>  sucralfate 1 Gram(s) Oral every 6 hours  ursodiol Capsule 300 milliGRAM(s) Oral three times a day      GENITOURINARY - Voiding  I&O's Detail    11-30 @ 07:01  -  12-01 @ 07:00  --------------------------------------------------------  IN:    Oral Fluid: 200 mL    Sodium Chloride 0.9% IV Bolus: 1000 mL    Solution: 50 mL  Total IN: 1250 mL    OUT:  Total OUT: 0 mL    Total NET: 1250 mL      12-01 @ 07:01  -  12-01 @ 16:24  --------------------------------------------------------  IN:  Total IN: 0 mL    OUT:    Voided: 300 mL  Total OUT: 300 mL    Total NET: -300 mL    12-01    142  |  110<H>  |  35<H>  ----------------------------<  238<H>  4.6   |  24  |  1.10    Ca    8.3<L>      01 Dec 2018 08:58  Phos  2.5     12-01  Mg     1.6     12-01    TPro  5.0<L>  /  Alb  2.0<L>  /  TBili  3.7<H>  /  DBili  x   /  AST  32  /  ALT  20  /  AlkPhos  137<H>  12-01    [ ] Beasley catheter, indication: N/A  Meds: dextrose 5%. 1000 milliLiter(s) IV Continuous <Continuous>  phytonadione  IVPB 5 milliGRAM(s) IV Intermittent once    HEMATOLOGIC  Meds:   [x] VTE Prophylaxis                        7.0    x     )-----------( x        ( 01 Dec 2018 14:38 )             20.7     PT/INR - ( 30 Nov 2018 16:25 )   PT: 19.6 sec;   INR: 1.69 ratio         PTT - ( 30 Nov 2018 16:25 )  PTT:33.1 sec  Transfusion     [ ] PRBC   [ ] Platelets   [ ] FFP   [ ] Cryoprecipitate      INFECTIOUS DISEASES  T(C): 36.7 (12-01-18 @ 15:40), Max: 36.8 (12-01-18 @ 00:10)  Wt(kg): --  WBC Count: 7.41 K/uL (12-01 @ 08:58)  WBC Count: 6.50 K/uL (12-01 @ 05:09)  WBC Count: 5.34 K/uL (12-01 @ 01:01)  WBC Count: 7.17 K/uL (11-30 @ 16:25)    Recent Cultures:  Specimen Source: .Urine Clean Catch (Midstream), 11-30 @ 20:19; Results   <10,000 CFU/ml Normal Urogenital ninfa present; Gram Stain: --; Organism: --    Meds: rifaximin 550 milliGRAM(s) Oral two times a day    ENDOCRINECAPILLARY BLOOD GLUCOSE      POCT Blood Glucose.: 219 mg/dL (01 Dec 2018 11:47)  POCT Blood Glucose.: 248 mg/dL (01 Dec 2018 08:14)  POCT Blood Glucose.: 242 mg/dL (30 Nov 2018 23:43)  POCT Blood Glucose.: 250 mg/dL (30 Nov 2018 22:11)      Meds: atorvastatin 10 milliGRAM(s) Oral at bedtime  dextrose 40% Gel 15 Gram(s) Oral once PRN  dextrose 50% Injectable 12.5 Gram(s) IV Push once  dextrose 50% Injectable 25 Gram(s) IV Push once  dextrose 50% Injectable 25 Gram(s) IV Push once  glucagon  Injectable 1 milliGRAM(s) IntraMuscular once PRN  insulin glargine Injectable (LANTUS) 15 Unit(s) SubCutaneous at bedtime  insulin lispro (HumaLOG) corrective regimen sliding scale   SubCutaneous every 6 hours        ACCESS DEVICES:  [x ] Peripheral IV  [ ] Central Venous Line	[ ] R	[ ] L	[ ] IJ	[ ] Fem	[ ] SC	Placed:   [ ] Arterial Line		[ ] R	[ ] L	[ ] Fem	[ ] Rad	[ ] Ax	Placed:   [ ] PICC:					[ ] Mediport  [ ] Urinary Catheter, Date Placed:   [ x] Necessity of urinary, arterial, and venous catheters discussed    OTHER MEDICATIONS:    CODE STATUS:  Full    IMAGING: CT angio: no obvious sign of active bleeding

## 2018-12-01 NOTE — PROGRESS NOTE ADULT - SUBJECTIVE AND OBJECTIVE BOX
INTERVAL HPI/OVERNIGHT EVENTS:  pt seen and examined earlier this am  c/o feeling tired  episode of brbpr x 2 overnight with bms, one episode of non bloody vomiting  currently denies n/v/abd pain  above dw rn no hematemesis, no c/o pain, HD stable overnight  afebrile overnight labs noted    MEDICATIONS  (STANDING):  atorvastatin 10 milliGRAM(s) Oral at bedtime  dextrose 5%. 1000 milliLiter(s) (50 mL/Hr) IV Continuous <Continuous>  dextrose 50% Injectable 12.5 Gram(s) IV Push once  dextrose 50% Injectable 25 Gram(s) IV Push once  dextrose 50% Injectable 25 Gram(s) IV Push once  gabapentin 800 milliGRAM(s) Oral four times a day  insulin glargine Injectable (LANTUS) 30 Unit(s) SubCutaneous at bedtime  insulin lispro (HumaLOG) corrective regimen sliding scale   SubCutaneous every 6 hours  lactulose Syrup 20 Gram(s) Oral three times a day  pantoprazole  Injectable 40 milliGRAM(s) IV Push two times a day  rifaximin 550 milliGRAM(s) Oral two times a day  sucralfate 1 Gram(s) Oral every 6 hours  ursodiol Capsule 300 milliGRAM(s) Oral three times a day    MEDICATIONS  (PRN):  dextrose 40% Gel 15 Gram(s) Oral once PRN Blood Glucose LESS THAN 70 milliGRAM(s)/deciliter  glucagon  Injectable 1 milliGRAM(s) IntraMuscular once PRN Glucose LESS THAN 70 milligrams/deciliter  ondansetron Injectable 4 milliGRAM(s) IV Push every 6 hours PRN Nausea      Allergies    codeine (Anaphylaxis)    Intolerances        Review of Systems:    General:  tired  Eyes:  Good vision, no reported pain  ENT:  No sore throat, pain, runny nose, dysphagia  CV:  No pain, palpitations, hypo/hypertension  Resp:  No dyspnea, cough, tachypnea, wheezing  GI:  No pain, No nausea, +vomiting, No diarrhea, No constipation, No weight loss, No fever, No pruritis, +rectal bleeding, No melena, No dysphagia  :  No pain, bleeding, incontinence, nocturia  Muscle:  No pain, weakness  Neuro:  No weakness, tingling, memory problems  Psych:  No fatigue, insomnia, mood problems, depression  Endocrine:  No polyuria, polydypsia, cold/heat intolerance  Heme:  No petechiae, ecchymosis, easy bruisability  Skin:  No rash, tattoos, scars, edema      Vital Signs Last 24 Hrs  T(C): 36.5 (01 Dec 2018 08:55), Max: 36.8 (01 Dec 2018 00:10)  T(F): 97.7 (01 Dec 2018 08:55), Max: 98.2 (01 Dec 2018 00:10)  HR: 97 (01 Dec 2018 08:55) (79 - 105)  BP: 101/64 (01 Dec 2018 08:55) (85/59 - 128/78)  BP(mean): --  RR: 18 (01 Dec 2018 08:55) (14 - 20)  SpO2: 97% (01 Dec 2018 08:55) (96% - 100%)    PHYSICAL EXAM:    Constitutional: NAD lying in bed  HEENT: ncat  Neck: No LAD,  Respiratory: dec bs  Cardiovascular: S1 and S2, RRR  Gastrointestinal: obese abd soft nt mild dt  Extremities: No peripheral edema  Vascular: 2+ peripheral pulses  Neurological: A/O to person place and time  Skin: mild jaundice      LABS:                        8.4    7.41  )-----------( 89       ( 01 Dec 2018 08:58 )             24.3     12-    142  |  110<H>  |  35<H>  ----------------------------<  238<H>  4.6   |  24  |  1.10    Ca    8.3<L>      01 Dec 2018 08:58  Phos  2.5       Mg     1.6         TPro  5.0<L>  /  Alb  2.0<L>  /  TBili  3.7<H>  /  DBili  x   /  AST  32  /  ALT  20  /  AlkPhos  137<H>  12-    PT/INR - ( 2018 16:25 )   PT: 19.6 sec;   INR: 1.69 ratio         PTT - ( 2018 16:25 )  PTT:33.1 sec  Urinalysis Basic - ( 2018 18:30 )    Color: Yellow / Appearance: Slightly Turbid / S.015 / pH: x  Gluc: x / Ketone: Trace  / Bili: Small / Urobili: Negative   Blood: x / Protein: 25 mg/dL / Nitrite: Negative   Leuk Esterase: Trace / RBC: 6-10 /HPF / WBC 3-5   Sq Epi: x / Non Sq Epi: Occasional / Bacteria: Occasional        RADIOLOGY & ADDITIONAL TESTS:

## 2018-12-01 NOTE — PROCEDURE NOTE - NSPROCDETAILS_GEN_ALL_CORE
guidewire recovered/sterile dressing applied/ultrasound guidance/sterile technique, catheter placed/lumen(s) aspirated and flushed

## 2018-12-02 ENCOUNTER — TRANSCRIPTION ENCOUNTER (OUTPATIENT)
Age: 63
End: 2018-12-02

## 2018-12-02 DIAGNOSIS — N17.9 ACUTE KIDNEY FAILURE, UNSPECIFIED: ICD-10-CM

## 2018-12-02 LAB
ALBUMIN SERPL ELPH-MCNC: 1.8 G/DL — LOW (ref 3.3–5)
ALP SERPL-CCNC: 72 U/L — SIGNIFICANT CHANGE UP (ref 40–120)
ALT FLD-CCNC: 20 U/L — SIGNIFICANT CHANGE UP (ref 12–78)
AMMONIA BLD-MCNC: 51 UMOL/L — HIGH (ref 11–32)
ANION GAP SERPL CALC-SCNC: 10 MMOL/L — SIGNIFICANT CHANGE UP (ref 5–17)
ANION GAP SERPL CALC-SCNC: 13 MMOL/L — SIGNIFICANT CHANGE UP (ref 5–17)
ANION GAP SERPL CALC-SCNC: 8 MMOL/L — SIGNIFICANT CHANGE UP (ref 5–17)
APTT BLD: 117.6 SEC — HIGH (ref 28.5–37)
APTT BLD: 30.3 SEC — SIGNIFICANT CHANGE UP (ref 28.5–37)
AST SERPL-CCNC: 29 U/L — SIGNIFICANT CHANGE UP (ref 15–37)
BASOPHILS # BLD AUTO: 0.03 K/UL — SIGNIFICANT CHANGE UP (ref 0–0.2)
BASOPHILS NFR BLD AUTO: 0.2 % — SIGNIFICANT CHANGE UP (ref 0–2)
BILIRUB DIRECT SERPL-MCNC: 1.2 MG/DL — HIGH (ref 0.05–0.2)
BILIRUB INDIRECT FLD-MCNC: 2.1 MG/DL — HIGH (ref 0.2–1)
BILIRUB SERPL-MCNC: 3.3 MG/DL — HIGH (ref 0.2–1.2)
BUN SERPL-MCNC: 49 MG/DL — HIGH (ref 7–23)
BUN SERPL-MCNC: 49 MG/DL — HIGH (ref 7–23)
BUN SERPL-MCNC: 50 MG/DL — HIGH (ref 7–23)
CALCIUM SERPL-MCNC: 7.7 MG/DL — LOW (ref 8.5–10.1)
CALCIUM SERPL-MCNC: 7.9 MG/DL — LOW (ref 8.5–10.1)
CALCIUM SERPL-MCNC: 8.1 MG/DL — LOW (ref 8.5–10.1)
CHLORIDE SERPL-SCNC: 106 MMOL/L — SIGNIFICANT CHANGE UP (ref 96–108)
CHLORIDE SERPL-SCNC: 109 MMOL/L — HIGH (ref 96–108)
CHLORIDE SERPL-SCNC: 110 MMOL/L — HIGH (ref 96–108)
CK SERPL-CCNC: 504 U/L — HIGH (ref 26–308)
CO2 SERPL-SCNC: 19 MMOL/L — LOW (ref 22–31)
CO2 SERPL-SCNC: 22 MMOL/L — SIGNIFICANT CHANGE UP (ref 22–31)
CO2 SERPL-SCNC: 24 MMOL/L — SIGNIFICANT CHANGE UP (ref 22–31)
CREAT SERPL-MCNC: 1.2 MG/DL — SIGNIFICANT CHANGE UP (ref 0.5–1.3)
CREAT SERPL-MCNC: 1.3 MG/DL — SIGNIFICANT CHANGE UP (ref 0.5–1.3)
CREAT SERPL-MCNC: 1.5 MG/DL — HIGH (ref 0.5–1.3)
D DIMER BLD IA.RAPID-MCNC: 268 NG/ML DDU — HIGH
EOSINOPHIL # BLD AUTO: 0 K/UL — SIGNIFICANT CHANGE UP (ref 0–0.5)
EOSINOPHIL NFR BLD AUTO: 0 % — SIGNIFICANT CHANGE UP (ref 0–6)
GLUCOSE SERPL-MCNC: 265 MG/DL — HIGH (ref 70–99)
GLUCOSE SERPL-MCNC: 309 MG/DL — HIGH (ref 70–99)
GLUCOSE SERPL-MCNC: 316 MG/DL — HIGH (ref 70–99)
HCT VFR BLD CALC: 23.3 % — LOW (ref 39–50)
HCT VFR BLD CALC: 23.9 % — LOW (ref 39–50)
HCT VFR BLD CALC: 24.3 % — LOW (ref 39–50)
HGB BLD-MCNC: 8.2 G/DL — LOW (ref 13–17)
HGB BLD-MCNC: 8.4 G/DL — LOW (ref 13–17)
HGB BLD-MCNC: 8.5 G/DL — LOW (ref 13–17)
IMM GRANULOCYTES NFR BLD AUTO: 0.8 % — SIGNIFICANT CHANGE UP (ref 0–1.5)
INR BLD: 1.6 RATIO — HIGH (ref 0.88–1.16)
INR BLD: 1.67 RATIO — HIGH (ref 0.88–1.16)
LACTATE SERPL-SCNC: 3.2 MMOL/L — HIGH (ref 0.7–2)
LACTATE SERPL-SCNC: 4.2 MMOL/L — CRITICAL HIGH (ref 0.7–2)
LACTATE SERPL-SCNC: 8.2 MMOL/L — CRITICAL HIGH (ref 0.7–2)
LYMPHOCYTES # BLD AUTO: 1.81 K/UL — SIGNIFICANT CHANGE UP (ref 1–3.3)
LYMPHOCYTES # BLD AUTO: 10.9 % — LOW (ref 13–44)
MAGNESIUM SERPL-MCNC: 1.7 MG/DL — SIGNIFICANT CHANGE UP (ref 1.6–2.6)
MCHC RBC-ENTMCNC: 30.4 PG — SIGNIFICANT CHANGE UP (ref 27–34)
MCHC RBC-ENTMCNC: 30.6 PG — SIGNIFICANT CHANGE UP (ref 27–34)
MCHC RBC-ENTMCNC: 31 PG — SIGNIFICANT CHANGE UP (ref 27–34)
MCHC RBC-ENTMCNC: 35 GM/DL — SIGNIFICANT CHANGE UP (ref 32–36)
MCHC RBC-ENTMCNC: 35.1 GM/DL — SIGNIFICANT CHANGE UP (ref 32–36)
MCHC RBC-ENTMCNC: 35.2 GM/DL — SIGNIFICANT CHANGE UP (ref 32–36)
MCV RBC AUTO: 86.6 FL — SIGNIFICANT CHANGE UP (ref 80–100)
MCV RBC AUTO: 86.9 FL — SIGNIFICANT CHANGE UP (ref 80–100)
MCV RBC AUTO: 88.7 FL — SIGNIFICANT CHANGE UP (ref 80–100)
MONOCYTES # BLD AUTO: 1.04 K/UL — HIGH (ref 0–0.9)
MONOCYTES NFR BLD AUTO: 6.3 % — SIGNIFICANT CHANGE UP (ref 2–14)
NEUTROPHILS # BLD AUTO: 13.53 K/UL — HIGH (ref 1.8–7.4)
NEUTROPHILS NFR BLD AUTO: 81.8 % — HIGH (ref 43–77)
NRBC # BLD: 0 /100 WBCS — SIGNIFICANT CHANGE UP (ref 0–0)
NRBC # BLD: 0 /100 WBCS — SIGNIFICANT CHANGE UP (ref 0–0)
PHOSPHATE SERPL-MCNC: 4 MG/DL — SIGNIFICANT CHANGE UP (ref 2.5–4.5)
PLATELET # BLD AUTO: 100 K/UL — LOW (ref 150–400)
PLATELET # BLD AUTO: 97 K/UL — LOW (ref 150–400)
PLATELET # BLD AUTO: 97 K/UL — LOW (ref 150–400)
POTASSIUM SERPL-MCNC: 4.3 MMOL/L — SIGNIFICANT CHANGE UP (ref 3.5–5.3)
POTASSIUM SERPL-MCNC: 4.6 MMOL/L — SIGNIFICANT CHANGE UP (ref 3.5–5.3)
POTASSIUM SERPL-MCNC: 4.8 MMOL/L — SIGNIFICANT CHANGE UP (ref 3.5–5.3)
POTASSIUM SERPL-SCNC: 4.3 MMOL/L — SIGNIFICANT CHANGE UP (ref 3.5–5.3)
POTASSIUM SERPL-SCNC: 4.6 MMOL/L — SIGNIFICANT CHANGE UP (ref 3.5–5.3)
POTASSIUM SERPL-SCNC: 4.8 MMOL/L — SIGNIFICANT CHANGE UP (ref 3.5–5.3)
PROT SERPL-MCNC: 4 G/DL — LOW (ref 6–8.3)
PROTHROM AB SERPL-ACNC: 18.5 SEC — HIGH (ref 10–12.9)
PROTHROM AB SERPL-ACNC: 19.3 SEC — HIGH (ref 10–12.9)
RBC # BLD: 2.68 M/UL — LOW (ref 4.2–5.8)
RBC # BLD: 2.74 M/UL — LOW (ref 4.2–5.8)
RBC # BLD: 2.76 M/UL — LOW (ref 4.2–5.8)
RBC # FLD: 17.6 % — HIGH (ref 10.3–14.5)
RBC # FLD: 18 % — HIGH (ref 10.3–14.5)
RBC # FLD: 18.6 % — HIGH (ref 10.3–14.5)
SODIUM SERPL-SCNC: 138 MMOL/L — SIGNIFICANT CHANGE UP (ref 135–145)
SODIUM SERPL-SCNC: 141 MMOL/L — SIGNIFICANT CHANGE UP (ref 135–145)
SODIUM SERPL-SCNC: 142 MMOL/L — SIGNIFICANT CHANGE UP (ref 135–145)
WBC # BLD: 10.8 K/UL — HIGH (ref 3.8–10.5)
WBC # BLD: 15.98 K/UL — HIGH (ref 3.8–10.5)
WBC # BLD: 16.55 K/UL — HIGH (ref 3.8–10.5)
WBC # FLD AUTO: 10.8 K/UL — HIGH (ref 3.8–10.5)
WBC # FLD AUTO: 15.98 K/UL — HIGH (ref 3.8–10.5)
WBC # FLD AUTO: 16.55 K/UL — HIGH (ref 3.8–10.5)

## 2018-12-02 PROCEDURE — 99291 CRITICAL CARE FIRST HOUR: CPT

## 2018-12-02 RX ORDER — GLUCAGON INJECTION, SOLUTION 0.5 MG/.1ML
1 INJECTION, SOLUTION SUBCUTANEOUS ONCE
Qty: 0 | Refills: 0 | Status: DISCONTINUED | OUTPATIENT
Start: 2018-12-02 | End: 2018-12-06

## 2018-12-02 RX ORDER — ACETAMINOPHEN 500 MG
650 TABLET ORAL EVERY 6 HOURS
Qty: 0 | Refills: 0 | Status: DISCONTINUED | OUTPATIENT
Start: 2018-12-02 | End: 2018-12-02

## 2018-12-02 RX ORDER — INSULIN GLARGINE 100 [IU]/ML
22 INJECTION, SOLUTION SUBCUTANEOUS AT BEDTIME
Qty: 0 | Refills: 0 | Status: DISCONTINUED | OUTPATIENT
Start: 2018-12-02 | End: 2018-12-03

## 2018-12-02 RX ORDER — SODIUM CHLORIDE 9 MG/ML
1000 INJECTION, SOLUTION INTRAVENOUS
Qty: 0 | Refills: 0 | Status: DISCONTINUED | OUTPATIENT
Start: 2018-12-02 | End: 2018-12-04

## 2018-12-02 RX ORDER — INSULIN LISPRO 100/ML
VIAL (ML) SUBCUTANEOUS EVERY 6 HOURS
Qty: 0 | Refills: 0 | Status: DISCONTINUED | OUTPATIENT
Start: 2018-12-02 | End: 2018-12-04

## 2018-12-02 RX ORDER — DEXTROSE 50 % IN WATER 50 %
25 SYRINGE (ML) INTRAVENOUS ONCE
Qty: 0 | Refills: 0 | Status: DISCONTINUED | OUTPATIENT
Start: 2018-12-02 | End: 2018-12-06

## 2018-12-02 RX ORDER — INSULIN LISPRO 100/ML
VIAL (ML) SUBCUTANEOUS
Qty: 0 | Refills: 0 | Status: DISCONTINUED | OUTPATIENT
Start: 2018-12-02 | End: 2018-12-02

## 2018-12-02 RX ORDER — GABAPENTIN 400 MG/1
200 CAPSULE ORAL
Qty: 0 | Refills: 0 | Status: DISCONTINUED | OUTPATIENT
Start: 2018-12-02 | End: 2018-12-11

## 2018-12-02 RX ORDER — INSULIN GLARGINE 100 [IU]/ML
12 INJECTION, SOLUTION SUBCUTANEOUS ONCE
Qty: 0 | Refills: 0 | Status: COMPLETED | OUTPATIENT
Start: 2018-12-02 | End: 2018-12-02

## 2018-12-02 RX ORDER — SODIUM CHLORIDE 9 MG/ML
1000 INJECTION, SOLUTION INTRAVENOUS
Qty: 0 | Refills: 0 | Status: DISCONTINUED | OUTPATIENT
Start: 2018-12-02 | End: 2018-12-06

## 2018-12-02 RX ORDER — DEXTROSE 50 % IN WATER 50 %
15 SYRINGE (ML) INTRAVENOUS ONCE
Qty: 0 | Refills: 0 | Status: DISCONTINUED | OUTPATIENT
Start: 2018-12-02 | End: 2018-12-06

## 2018-12-02 RX ORDER — DEXTROSE 50 % IN WATER 50 %
12.5 SYRINGE (ML) INTRAVENOUS ONCE
Qty: 0 | Refills: 0 | Status: DISCONTINUED | OUTPATIENT
Start: 2018-12-02 | End: 2018-12-06

## 2018-12-02 RX ADMIN — INSULIN GLARGINE 12 UNIT(S): 100 INJECTION, SOLUTION SUBCUTANEOUS at 11:07

## 2018-12-02 RX ADMIN — SODIUM CHLORIDE 75 MILLILITER(S): 9 INJECTION, SOLUTION INTRAVENOUS at 14:19

## 2018-12-02 RX ADMIN — Medication 4: at 05:37

## 2018-12-02 RX ADMIN — Medication 4: at 12:04

## 2018-12-02 RX ADMIN — Medication 1 GRAM(S): at 05:37

## 2018-12-02 RX ADMIN — URSODIOL 300 MILLIGRAM(S): 250 TABLET, FILM COATED ORAL at 21:41

## 2018-12-02 RX ADMIN — Medication 1 GRAM(S): at 23:18

## 2018-12-02 RX ADMIN — Medication 1 GRAM(S): at 11:07

## 2018-12-02 RX ADMIN — Medication 1: at 23:18

## 2018-12-02 RX ADMIN — Medication 2 MILLIGRAM(S): at 23:56

## 2018-12-02 RX ADMIN — GABAPENTIN 200 MILLIGRAM(S): 400 CAPSULE ORAL at 17:28

## 2018-12-02 RX ADMIN — PANTOPRAZOLE SODIUM 10 MG/HR: 20 TABLET, DELAYED RELEASE ORAL at 13:11

## 2018-12-02 RX ADMIN — Medication 3: at 17:28

## 2018-12-02 RX ADMIN — GABAPENTIN 800 MILLIGRAM(S): 400 CAPSULE ORAL at 05:37

## 2018-12-02 RX ADMIN — URSODIOL 300 MILLIGRAM(S): 250 TABLET, FILM COATED ORAL at 05:37

## 2018-12-02 RX ADMIN — INSULIN GLARGINE 22 UNIT(S): 100 INJECTION, SOLUTION SUBCUTANEOUS at 21:42

## 2018-12-02 RX ADMIN — GABAPENTIN 200 MILLIGRAM(S): 400 CAPSULE ORAL at 23:18

## 2018-12-02 RX ADMIN — PANTOPRAZOLE SODIUM 10 MG/HR: 20 TABLET, DELAYED RELEASE ORAL at 19:53

## 2018-12-02 RX ADMIN — GABAPENTIN 200 MILLIGRAM(S): 400 CAPSULE ORAL at 11:07

## 2018-12-02 RX ADMIN — URSODIOL 300 MILLIGRAM(S): 250 TABLET, FILM COATED ORAL at 13:11

## 2018-12-02 RX ADMIN — Medication 1 GRAM(S): at 17:28

## 2018-12-02 NOTE — DIETITIAN INITIAL EVALUATION ADULT. - NS AS NUTRI INTERV MEALS SNACK
When medically feasible recommend clear liquid consistent carb,dash/tlc diet & advance as tolerated to Consistent Carb, Dash/TLC, soft, GERD precautions. Will monitor NH3 level with protein restrictions as medically necessary.

## 2018-12-02 NOTE — CONSULT NOTE ADULT - SUBJECTIVE AND OBJECTIVE BOX
Patient is a 62y old  Male who presents with a chief complaint of vomiting and near syncope (01 Dec 2018 16:23)      HPI: pt with upper GI bleeding s/p EGD with bleeding duodenal vessel.    MEDICATIONS:    MEDICATIONS  (STANDING):  dextrose 5%. 1000 milliLiter(s) (50 mL/Hr) IV Continuous <Continuous>  dextrose 50% Injectable 12.5 Gram(s) IV Push once  dextrose 50% Injectable 25 Gram(s) IV Push once  dextrose 50% Injectable 25 Gram(s) IV Push once  gabapentin 800 milliGRAM(s) Oral four times a day  insulin glargine Injectable (LANTUS) 15 Unit(s) SubCutaneous at bedtime  insulin lispro (HumaLOG) corrective regimen sliding scale   SubCutaneous every 6 hours  lactulose Syrup 20 Gram(s) Oral three times a day  pantoprazole Infusion 8 mG/Hr (10 mL/Hr) IV Continuous <Continuous>  rifaximin 550 milliGRAM(s) Oral two times a day  sucralfate 1 Gram(s) Oral every 6 hours  ursodiol Capsule 300 milliGRAM(s) Oral three times a day    MEDICATIONS  (PRN):  dextrose 40% Gel 15 Gram(s) Oral once PRN Blood Glucose LESS THAN 70 milliGRAM(s)/deciliter  glucagon  Injectable 1 milliGRAM(s) IntraMuscular once PRN Glucose LESS THAN 70 milligrams/deciliter      Allergies    codeine (Anaphylaxis)    Intolerances        PAST MEDICAL & SURGICAL HISTORY:  GERD with esophagitis: Gastritis &amp; Non Bleeding Ulcers  Hepatic encephalopathy  Obesity  Fatty liver disease, nonalcoholic  Renal stones: 25 years ago  Hypertension  Neuropathy  Hypercholesteremia  Diabetes  S/P cholecystectomy        ROS:    CONSTITUTIONAL: No fever, weight loss, or fatigue  EYES: No eye pain, visual disturbances, or discharge, no icteris  ENMT:  No difficulty hearing, tinnitus, vertigo; No sinus or throat pain  NECK: No pain or stiffness  BREASTS: No pain, masses, or nipple discharge  RESPIRATORY: No cough, wheezing, chills or hemoptysis; No shortness of breath  CARDIOVASCULAR: weakness and syncopy  GASTROINTESTINAL: GI bleeding      Vital Signs Last 24 Hrs  T(C): 36.5 (02 Dec 2018 00:13), Max: 36.8 (01 Dec 2018 04:26)  T(F): 97.7 (02 Dec 2018 00:13), Max: 98.2 (01 Dec 2018 04:26)  HR: 98 (01 Dec 2018 23:00) (92 - 101)  BP: 125/61 (01 Dec 2018 23:00) (79/51 - 125/61)  BP(mean): 88 (01 Dec 2018 23:00) (68 - 88)  RR: 11 (01 Dec 2018 23:00) (11 - 26)  SpO2: 100% (01 Dec 2018 23:00) (95% - 100%)    PHYSICAL EXAM:    Constitutional: NAD well-developed A: Ox3  HEENT: PERRLA, EOMI, nonicteric  Neck: No JVD  Respiratory:  clear  Cardiac NSR, without murmurs  Gastrointestinal: without tenderness without guarding or rebound  Extremities: No peripheral edema  Vascular: 2+ peripheral pulses  Neurological: A/O x 3, no focal deficits  Skin: No rashes    LABS:                        6.2    8.42  )-----------( 122      ( 01 Dec 2018 20:46 )             18.1         142  |  110<H>  |  35<H>  ----------------------------<  238<H>  4.6   |  24  |  1.10    Ca    8.3<L>      01 Dec 2018 08:58  Phos  2.5       Mg     1.6         TPro  5.0<L>  /  Alb  2.0<L>  /  TBili  3.7<H>  /  DBili  x   /  AST  32  /  ALT  20  /  AlkPhos  137<H>      PT/INR - ( 01 Dec 2018 20:48 )   PT: 20.9 sec;   INR: 1.82 ratio         PTT - ( 01 Dec 2018 20:48 )  PTT:33.4 sec  Urinalysis Basic - ( 2018 18:30 )    Color: Yellow / Appearance: Slightly Turbid / S.015 / pH: x  Gluc: x / Ketone: Trace  / Bili: Small / Urobili: Negative   Blood: x / Protein: 25 mg/dL / Nitrite: Negative   Leuk Esterase: Trace / RBC: 6-10 /HPF / WBC 3-5   Sq Epi: x / Non Sq Epi: Occasional / Bacteria: Occasional      Hemoglobin A1C, Whole Blood: 8.3 % ( @ 10:32)  Hemoglobin A1C, Whole Blood: 9.7 % (10-26 @ 14:51)  Hemoglobin A1C, Whole Blood: 10.2 % (10-18 @ 10:50)      RADIOLOGY & ADDITIONAL STUDIES:      -  -  -  -

## 2018-12-02 NOTE — PROGRESS NOTE ADULT - PROBLEM SELECTOR PLAN 3
-elevated Bili -?? Etiology Coagulopathy 2/2 Liver disease   Abnormal LFT's , Bili & PT/PTT/INR - Dr Rios -Hem Follow up  - pt follows GI (Dr. Morales)   -cont ppi, sandra, rifaximin bid ,  - f/u CMP in AM

## 2018-12-02 NOTE — DIETITIAN INITIAL EVALUATION ADULT. - NUTRITION INTERVENTION
Nutrition - Related Medication Management/Nutrition Education/Meals and Snack Nutrition Education/Meals and Snack/Nutrition - Related Medication Management

## 2018-12-02 NOTE — PROGRESS NOTE ADULT - SUBJECTIVE AND OBJECTIVE BOX
Patient is a 62y old  Male who presents with a chief complaint of vomiting and near syncope (02 Dec 2018 08:36)    24 hour events: s/p 7 PRBCs, 4 FFPs, 2 Plt  overnight EGD with bleeding duodenal ulcer, unsuccessful clipping, control achieved with a lot of Epi  he continues to have melena   patient feels better c/w yesterday  denies abd pain, nausea, vomiting, numbness, lightheadedness    REVIEW OF SYSTEMS  Constitutional: No fever, chills, fatigue  Neuro: No headache, numbness, weakness  Resp: No cough, wheezing, shortness of breath  CVS: No chest pain, palpitations, leg swelling  GI: +melena, No abdominal pain, nausea, vomiting, diarrhea   : No dysuria, frequency, incontinence  Skin: No itching, burning, rashes, or lesions   Msk: No joint pain or swelling  Psych: No depression, anxiety, mood swings  Heme: No bleeding    T(F): 97.1 (18 @ 08:00), Max: 98 (18 @ 15:40)  HR: 109 (18 @ 09:00) (92 - 134)  BP: 132/60 (18 @ 09:00) (79/51 - 138/64)  RR: 15 (18 @ 09:00) (10 - 51)  SpO2: 99% (18 @ 09:00) (95% - 100%)    CAPILLARY BLOOD GLUCOSE  POCT Blood Glucose.: 323 mg/dL (02 Dec 2018 05:34)  POCT Blood Glucose.: 313 mg/dL (01 Dec 2018 23:16)  POCT Blood Glucose.: 264 mg/dL (01 Dec 2018 17:38)  POCT Blood Glucose.: 219 mg/dL (01 Dec 2018 11:47)    I&O's Summary     @ :  -   @ 07:00  --------------------------------------------------------  IN: 1914 mL / OUT: 1030 mL / NET: 884 mL     @ 07:  -   @ 09:41  --------------------------------------------------------  IN: 20 mL / OUT: 80 mL / NET: -60 mL    PHYSICAL EXAM  General: NAD  CNS: AAOx3, no focal deficits  HEENT: PERRL, dry mucosa  Resp: clear b/l  CVS: S1S2, regular, tachy  Abd: soft, NT, ND, +BS  Ext: no edema  Skin: not mottled    MEDICATIONS  rifaximin Oral  insulin glargine Injectable (LANTUS) SubCutaneous  insulin glargine Injectable (LANTUS) SubCutaneous  insulin lispro (HumaLOG) corrective regimen sliding scale SubCutaneous  pantoprazole Infusion IV Continuous  sucralfate Oral  ursodiol Capsule Oral  dextrose 5%. IV Continuous                        8.5    16.55 )-----------( 97       ( 02 Dec 2018 08:40 )             24.3     12    142  |  110<H>  |  49<H>  ----------------------------<  316<H>  4.8   |  19<L>  |  1.50<H>    Ca    8.1<L>      02 Dec 2018 08:40  Phos  4.0       Mg     1.7         TPro  4.0<L>  /  Alb  1.8<L>  /  TBili  3.3<H>  /  DBili  1.20<H>  /  AST  29  /  ALT  20  /  AlkPhos  72      Lactate 8.2            @ 08:40    Lactate 4.6            @ 20:47    CARDIAC MARKERS ( 02 Dec 2018 08:40 )  x     / x     / 504 U/L / x     / x      CARDIAC MARKERS ( 2018 23:51 )  .019 ng/mL / x     / 301 U/L / x     / 2.8 ng/mL  CARDIAC MARKERS ( 2018 16:25 )  .019 ng/mL / x     / x     / x     / x        PT/INR - ( 02 Dec 2018 08:40 )   PT: 19.3 sec;   INR: 1.67 ratio       PTT - ( 02 Dec 2018 08:40 )  PTT:117.6 sec  Urinalysis Basic - ( 2018 18:30 )    Color: Yellow / Appearance: Slightly Turbid / S.015 / pH: x  Gluc: x / Ketone: Trace  / Bili: Small / Urobili: Negative   Blood: x / Protein: 25 mg/dL / Nitrite: Negative   Leuk Esterase: Trace / RBC: 6-10 /HPF / WBC 3-5   Sq Epi: x / Non Sq Epi: Occasional / Bacteria: Occasional    .Blood Blood   No growth to date. --  @ 20:55  .Urine Clean Catch (Midstream)   <10,000 CFU/ml Normal Urogenital ninfa present --  @ 20:19    Rapid RVP Result: NotDetec ( @ 20:45)    CENTRAL LINE: Y               REMOVE: N  HAYES: Y                            REMOVE: N  A-LINE: N              GLOBAL ISSUE/BEST PRACTICE  Analgesia: NA  Sedation: NA  CAM-ICU: neg  HOB elevation: yes  Stress ulcer prophylaxis: Y  VTE prophylaxis: SCDs (active bleeding)  Glycemic control: Y  Nutrition: N    CODE STATUS: full  GOC discussion: Y

## 2018-12-02 NOTE — PROGRESS NOTE ADULT - SUBJECTIVE AND OBJECTIVE BOX
sp egd    large du with large adherent clot --- ~15 cc [id number] epinephrine was injected with hemostasis achieved.      rec:    ppi drip  check coags and transfuse ffp /vitk  monitor cbc  if rebleeds will need IR vs Surg  dw dr morrow

## 2018-12-02 NOTE — PROGRESS NOTE ADULT - PROBLEM SELECTOR PLAN 5
poss sec to vasovagal ? LOC- unclear, Hypotension 2/2 likely GI Bleed  -HOLD Lasix & ARB  - CT Head neg , CT Neck -No Fx , S/P  IV Fluid bolus x 4 Lit in ER   - EKG - normal sinus rhythm;   - Neurology Dr Peña on case   - fall risk precautions, OOB to chair

## 2018-12-02 NOTE — DIETITIAN INITIAL EVALUATION ADULT. - PROBLEM SELECTOR PLAN 1
poss sec to dehydration vs vasovagal ? LOC- unclear, Hypotension   -HOLD Lasix & ARB  - CT Head neg , CT Neck -No Fx , S/P  IV Fluid bolus x 4 Lit given  - EKG - normal sinus rhythm;   - Neurology Dr Peña called  - consult cardio ([facility]), appreciate recs  - fall risk precautions, OOB to chair

## 2018-12-02 NOTE — PROGRESS NOTE ADULT - SUBJECTIVE AND OBJECTIVE BOX
Patient is a 62y old  Male who presents with a chief complaint of vomiting and near syncope, upper GI bleeding (02 Dec 2018 13:00)      INTERVAL HPI:      OVERNIGHT EVENTS:    Home Medications:  Bacid (LAC) oral tablet: 1 tab(s) orally 2 times a day for  2weeks (2018 19:41)  HumaLOG 100 units/mL subcutaneous solution: 20 unit(s) subcutaneous 2 times a day(breakfast and lunch) (2018 19:41)  HumaLOG 100 units/mL subcutaneous solution: 30 unit(s) subcutaneous once a day(@dinner) (2018 19:41)  irbesartan 300 mg oral tablet: 1 tab(s) orally once a day (2018 19:41)  lactulose 10 g/15 mL oral syrup: 15 milliliter(s) orally 3 times a day (2018 19:41)  Lasix 20 mg oral tablet: 1 tab(s) orally once a day (2018 19:41)  pantoprazole 40 mg oral delayed release tablet: 1 tab(s) orally once a day (2018 19:41)  phytonadione 100 mcg oral tablet: 1 tab(s) orally once a day (2018 19:41)  potassium chloride 10 mEq oral tablet, extended release: 1 tab(s) orally once a day (2018 19:41)  pravastatin 40 mg oral tablet: 1 tab(s) orally once a day (2018 19:41)  Vitamin D3 2000 intl units oral tablet: 1 tab(s) orally once a day (2018 19:41)      MEDICATIONS  (STANDING):  dextrose 5%. 1000 milliLiter(s) (50 mL/Hr) IV Continuous <Continuous>  dextrose 50% Injectable 12.5 Gram(s) IV Push once  dextrose 50% Injectable 25 Gram(s) IV Push once  dextrose 50% Injectable 25 Gram(s) IV Push once  gabapentin 200 milliGRAM(s) Oral four times a day  insulin glargine Injectable (LANTUS) 22 Unit(s) SubCutaneous at bedtime  insulin lispro (HumaLOG) corrective regimen sliding scale   SubCutaneous every 6 hours  pantoprazole Infusion 8 mG/Hr (10 mL/Hr) IV Continuous <Continuous>  rifaximin 550 milliGRAM(s) Oral two times a day  sucralfate 1 Gram(s) Oral every 6 hours  ursodiol Capsule 300 milliGRAM(s) Oral three times a day    MEDICATIONS  (PRN):  dextrose 40% Gel 15 Gram(s) Oral once PRN Blood Glucose LESS THAN 70 milliGRAM(s)/deciliter  glucagon  Injectable 1 milliGRAM(s) IntraMuscular once PRN Glucose LESS THAN 70 milligrams/deciliter      Allergies    codeine (Anaphylaxis)    Intolerances        REVIEW OF SYSTEMS:  CONSTITUTIONAL: No fever, No chills, No fatigue, No myalgia, No Body ache, No Weakness  EYES: No eye pain,  No visual disturbances, No discharge, NO Redness  ENMT:  No ear pain, No nose bleed, No vertigo; No sinus or throat pain, No Congestion  NECK: No pain, No stiffness  RESPIRATORY: No cough, wheezing, No  hemoptysis, No shortness of breath  CARDIOVASCULAR: No chest pain, palpitations  GASTROINTESTINAL: No abdominal or epigastric pain. No nausea, No vomiting; No diarrhea or constipation. [  ] BM  GENITOURINARY: No dysuria, No frequency, No urgency, No hematuria, or incontinence  NEUROLOGICAL: No headaches, No dizziness, No numbness, No tingling, No tremors, No weakness  EXT: No Swelling, No Pain, No Edema  SKIN:  [  ] No itching, burning, rashes, or lesions   MUSCULOSKELETAL: No joint pain or swelling; No muscle pain, No back pain, No extremity pain  PSYCHIATRIC: No depression, anxiety, mood swings or difficulty sleeping at night  PAIN SCALE: [  ] None  [  ] Other-  ROS Unable to obtain due to - [  ] Dementia  [  ] Lethargy  [  ] Sedated [  ] non verbal  REST OF REVIEW Of SYSTEM - [  ] Normal     Vital Signs Last 24 Hrs  T(C): 36.8 (02 Dec 2018 11:58), Max: 36.8 (02 Dec 2018 11:58)  T(F): 98.2 (02 Dec 2018 11:58), Max: 98.2 (02 Dec 2018 11:58)  HR: 111 (02 Dec 2018 12:28) (92 - 134)  BP: 115/53 (02 Dec 2018 12:00) (79/51 - 144/66)  BP(mean): 76 (02 Dec 2018 12:00) (58 - 95)  RR: 20 (02 Dec 2018 12:28) (10 - 51)  SpO2: 99% (02 Dec 2018 12:28) (95% - 100%)  Finger Stick         @ 07:  -   @ 07:00  --------------------------------------------------------  IN: 1914 mL / OUT: 1030 mL / NET: 884 mL     @ 07:  -   @ 13:15  --------------------------------------------------------  IN: 50 mL / OUT: 220 mL / NET: -170 mL        PHYSICAL EXAM:  GENERAL:  [  ] NAD , [  ] well appearing, [  ] Agitated, [  ] Drowsy,  [  ] Lethargy, [  ] confused   HEAD:  [  ] Normal, [  ] Other  EYES:  [  ] EOMI, [  ] PERRLA, [  ] conjunctiva and sclera clear normal, [  ] Other,  [  ] Pallor,[  ] Discharge  ENMT:  [  ] Normal, [  ] Moist mucous membranes, [  ] Good dentition, [  ] No Thrush  NECK:  [  ] Supple, [  ] No JVD, [  ] Normal thyroid, [  ] Lymphadenopathy [  ] Other  CHEST/LUNG:  [  ] Clear to auscultation bilaterally, [  ] Breath Sounds equal OR Decrease,  [  ] No rales, [  ] No rhonchi  [  ]  No wheezing  HEART:  [  ] Regular rate and rhythm, [  ] tachycardia, [  ] Bradycardia,  [  ] irregular  [  ] No murmurs, No rubs, No gallops, [  ] PPM in place (Mfr:  )  ABDOMEN:  [  ] Soft, [  ] Nontender, [  ] Nondistended, [  ] No mass, [  ] Bowel sounds present, [  ] obese  NERVOUS SYSTEM:  [  ] Alert & Oriented X3, [  ] Nonfocal  [  ] Confusion  [  ] Encephalopathic [  ] Sedated [  ] Unable to assess, [  ] Other-  EXTREMITIES: [  ] 2+ Peripheral Pulses, No clubbing, No cyanosis,  [  ] edema B/L lower EXT. [  ] PVD stasis skin changes B/L Lower EXT  LYMPH: No lymphadenopathy noted  SKIN:  [  ] No rashes or lesions, [  ] Pressure Ulcers, [  ] ecchymosis, [  ] Skin Tears, [  ] Other    DIET:     LABS:                        8.4    15.98 )-----------( 97       ( 02 Dec 2018 12:52 )             23.9     02 Dec 2018 12:52    141    |  109    |  50     ----------------------------<  309    4.6     |  22     |  1.30     Ca    7.7        02 Dec 2018 12:52  Phos  4.0       02 Dec 2018 08:40  Mg     1.7       02 Dec 2018 08:40    TPro  4.0    /  Alb  1.8    /  TBili  3.3    /  DBili  1.20   /  AST  29     /  ALT  20     /  AlkPhos  72     02 Dec 2018 08:40    PT/INR - ( 02 Dec 2018 12:52 )   PT: 18.5 sec;   INR: 1.60 ratio         PTT - ( 02 Dec 2018 12:52 )  PTT:30.3 sec  Urinalysis Basic - ( 2018 18:30 )    Color: Yellow / Appearance: Slightly Turbid / S.015 / pH: x  Gluc: x / Ketone: Trace  / Bili: Small / Urobili: Negative   Blood: x / Protein: 25 mg/dL / Nitrite: Negative   Leuk Esterase: Trace / RBC: 6-10 /HPF / WBC 3-5   Sq Epi: x / Non Sq Epi: Occasional / Bacteria: Occasional        Culture Results:   No growth to date. ( @ 20:55)  Culture Results:   No growth to date. ( @ 20:55)  Culture Results:   <10,000 CFU/ml Normal Urogenital ninfa present ( @ 20:19)      culture blood  -- .Blood Blood  @ 20:55    culture urine  --   @ 20:55  culture blood  -- .Urine Clean Catch (Midstream)  @ 20:19    culture urine  --   @ 20:19      CARDIAC MARKERS ( 02 Dec 2018 08:40 )  x     / x     / 504 U/L / x     / x      CARDIAC MARKERS ( 2018 23:51 )  .019 ng/mL / x     / 301 U/L / x     / 2.8 ng/mL  CARDIAC MARKERS ( 2018 16:25 )  .019 ng/mL / x     / x     / x     / x              Culture - Blood (collected 2018 20:55)  Source: .Blood Blood  Preliminary Report (01 Dec 2018 21:01):    No growth to date.    Culture - Blood (collected 2018 20:55)  Source: .Blood Blood  Preliminary Report (01 Dec 2018 21:01):    No growth to date.    Culture - Urine (collected 2018 20:19)  Source: .Urine Clean Catch (Midstream)  Final Report (01 Dec 2018 15:16):    <10,000 CFU/ml Normal Urogenital ninfa present         Lipase, Serum: 70 U/L (18 @ 16:25)      RADIOLOGY & ADDITIONAL TESTS:      HEALTH ISSUES - PROBLEM Dx:  GIB (gastrointestinal bleeding): GIB (gastrointestinal bleeding)  Lactic acid blood increased: Lactic acid blood increased  Prophylactic measure: Prophylactic measure  Pedal edema: Pedal edema  Neuropathy: Neuropathy  Hypercholesteremia: Hypercholesteremia  Hypertension: Hypertension  Fatty liver disease, nonalcoholic: Fatty liver disease, nonalcoholic  Hepatic encephalopathy: Hepatic encephalopathy  Diabetes: Diabetes  Near syncope: Near syncope  Vomiting: Vomiting          Consultant(s) Notes Reviewed:  [  ] YES     Care Discussed with [X] Consultants  [  ] Patient  [  ] Family  [  ]   [  ] Social Service  [  ] RN, [  ] Physical Therapy  DVT PPX: [  ] Lovenox, [  ] S C Heparin, [  ] Coumadin, [  ] Xarelto, [  ] Eliquis, [  ] Pradaxa, [  ] IV Heparin drip, [  ] SCD [  ] Contraindication 2 to GI Bleed,[  ] Ambulation  Advanced directive: [  ] None, [  ] DNR/DNI Patient is a 62y old  Male who presents with a chief complaint of vomiting and near syncope, upper GI bleeding (02 Dec 2018 13:00)      INTERVAL HPI:  Pt is a 63 y/o M with PMHx of Type 2 DM, HTN, HLD, obesity, nephrolithiasis (last stone 25 years ago), neuropathy, HE, NAFLD, Hyper ammonemia, recent Gastritis/ esophagitis on EGD & Gram variable teddy bacteremia in 10/2018  who presents with vomiting and  near syncope/falling out of a moving car. Pt [place] and [place]", his vision was cloudy, he pulled over the car and stopped and vomited. Vomitus was "creamy white." Pt denies seeing blood in vomitus. His dtr then took over driving and Pt again felt like he needed to vomit and told dtr to pull over. He thought the car had stopped and he opened the door and fell out of the moving car. He laid on the ground for ~ 15 minutes. Pt says he possibly lost consciousness. States when he was lying on the ground felt very cold. Pt has had "terrible heartburn" for 2 days and felt chills for 2 days. Pt denies abdominal pain, CP, palp, SOB, cough, myalgias, rash, HA. Pt admits to chills, vomiting (4 episodes), diarrhea (from lactulose), dizziness, pedal edema, tingling in hands (chronic, intermittent). Pt denies recent travel, sick contacts, different eating patterns.    Pt states that early this week pt saw her Endocrinologist & started back on Metformin 500 mg daily as per Dr's recommendation.  In ED Pt's vitals were: T(C): 36.3, HR: 91, BP: 85/59, RR: 15, SpO2: 98% on RA  Labs significant for lactate 5.6, , Hgb 10.4, total bili 3.7, alk phos 183, ammonia 72, Mg 1.4. UA - trace LE, mod blood, RBC 6-10, urine glucose 1,000.   CXR - negative chest  Abd Xray - There is mild content in the colon and no overt sense of bowel obstruction.  CT head - no intracranial hemorrhage or mass effect  CT cervical - Multilevel degenerative disc disease/cervical spondylosis, with posterior osteophyte disc complex C6-7 resulting in flattening of the cervical cord. If there is a clinical concern for spinal cord injury, recommend further evaluation with MRI if there are no clinical contraindications.  EKG - NSR, 93bpm    Pt given 2L NS bolus, Zofran pantoprazole, famotidine, Carafate , Pt seen by GI Dr Morales in ER & was admitted for further Eval.    18: Pt seen, examined, Had total 4 bloody BM so far , Lactate improved, pt had CT A/P with IV Contrast. Drop in H/H. plan for, start Protonix drip,  2 u pRBC 1 u FFP & 5 mg Vit K x 1 as d/w Dr Franks -GI, Elevated ammonia level. Coagulopathy, Improving Lactate. Ac Blood loss Anemia.    18: Pt seen, Examined, pt was transferred to ICU on 18 for symptomatic upper GI Bleed, S/P 7  units pRBC in Total , 3 FFP, 1 platelets transfusion given, Pt had emergent EGD done that found Ac Bleeding Duodenal ulcer found. S/P Epi given. Homeostasis obtained, Pt seen By Sx Dr Hernandez. H/H Stable,  today with MISA, Coagulopathy , Leukocytosis & High Lactate level.  H/H Low stable with Ac Blood  Loss Anemia. Hematology Dr Rios called, as pt is followed by Dr Rios- H/O as out pt.      OVERNIGHT EVENTS: GI Bleed with S/P EGD with bleeding  Duodenal Ulcer  & multiple pRBC transfusions,     Home Medications:  Bacid (LAC) oral tablet: 1 tab(s) orally 2 times a day for  2weeks (2018 19:41)  HumaLOG 100 units/mL subcutaneous solution: 20 unit(s) subcutaneous 2 times a day(breakfast and lunch) (2018 19:41)  HumaLOG 100 units/mL subcutaneous solution: 30 unit(s) subcutaneous once a day(@dinner) (2018 19:41)  irbesartan 300 mg oral tablet: 1 tab(s) orally once a day (2018 19:41)  lactulose 10 g/15 mL oral syrup: 15 milliliter(s) orally 3 times a day (2018 19:41)  Lasix 20 mg oral tablet: 1 tab(s) orally once a day (2018 19:41)  pantoprazole 40 mg oral delayed release tablet: 1 tab(s) orally once a day (2018 19:41)  phytonadione 100 mcg oral tablet: 1 tab(s) orally once a day (2018 19:41)  potassium chloride 10 mEq oral tablet, extended release: 1 tab(s) orally once a day (2018 19:41)  pravastatin 40 mg oral tablet: 1 tab(s) orally once a day (2018 19:41)  Vitamin D3 2000 intl units oral tablet: 1 tab(s) orally once a day (2018 19:41)      MEDICATIONS  (STANDING):  dextrose 5%. 1000 milliLiter(s) (50 mL/Hr) IV Continuous <Continuous>  dextrose 50% Injectable 12.5 Gram(s) IV Push once  dextrose 50% Injectable 25 Gram(s) IV Push once  dextrose 50% Injectable 25 Gram(s) IV Push once  gabapentin 200 milliGRAM(s) Oral four times a day  insulin glargine Injectable (LANTUS) 22 Unit(s) SubCutaneous at bedtime  insulin lispro (HumaLOG) corrective regimen sliding scale   SubCutaneous every 6 hours  pantoprazole Infusion 8 mG/Hr (10 mL/Hr) IV Continuous <Continuous>  rifaximin 550 milliGRAM(s) Oral two times a day  sucralfate 1 Gram(s) Oral every 6 hours  ursodiol Capsule 300 milliGRAM(s) Oral three times a day    MEDICATIONS  (PRN):  dextrose 40% Gel 15 Gram(s) Oral once PRN Blood Glucose LESS THAN 70 milliGRAM(s)/deciliter  glucagon  Injectable 1 milliGRAM(s) IntraMuscular once PRN Glucose LESS THAN 70 milligrams/deciliter      Allergies    codeine (Anaphylaxis)    Intolerances        REVIEW OF SYSTEMS: feel very tired  CONSTITUTIONAL: No fever, No chills, No fatigue, No myalgia, No Body ache, No Weakness  EYES: No eye pain,  No visual disturbances, No discharge, NO Redness  ENMT:  No ear pain, No nose bleed, No vertigo; No sinus or throat pain, No Congestion  NECK: No pain, No stiffness  RESPIRATORY: No cough, wheezing, No  hemoptysis, No shortness of breath  CARDIOVASCULAR: No chest pain, palpitations  GASTROINTESTINAL: No abdominal or epigastric pain. No nausea, No vomiting; No diarrhea or constipation. [  ] BM  GENITOURINARY: No dysuria, No frequency, No urgency, No hematuria, or incontinence  NEUROLOGICAL: No headaches, No dizziness, No numbness, No tingling, No tremors, No weakness  EXT: No Swelling, No Pain, No Edema  SKIN:  [ x ] No itching, burning, rashes, or lesions   MUSCULOSKELETAL: No joint pain or swelling; No muscle pain, No back pain, No extremity pain  PSYCHIATRIC: No depression, anxiety, mood swings or difficulty sleeping at night  PAIN SCALE: [x  ] None  [  ] Other-  ROS Unable to obtain due to - [  ] Dementia  [  ] Lethargy  [  ] Sedated [  ] non verbal  REST OF REVIEW Of SYSTEM - [ x ] Normal     Vital Signs Last 24 Hrs  T(C): 36.8 (02 Dec 2018 11:58), Max: 36.8 (02 Dec 2018 11:58)  T(F): 98.2 (02 Dec 2018 11:58), Max: 98.2 (02 Dec 2018 11:58)  HR: 111 (02 Dec 2018 12:28) (92 - 134)  BP: 115/53 (02 Dec 2018 12:00) (79/51 - 144/66)  BP(mean): 76 (02 Dec 2018 12:00) (58 - 95)  RR: 20 (02 Dec 2018 12:28) (10 - 51)  SpO2: 99% (02 Dec 2018 12:28) (95% - 100%)  Finger Stick         @ :  -   @ 07:00  --------------------------------------------------------  IN: 1914 mL / OUT: 1030 mL / NET: 884 mL     @ 07:  -   @ 13:15  --------------------------------------------------------  IN: 50 mL / OUT: 220 mL / NET: -170 mL        PHYSICAL EXAM:  GENERAL:  [x  ] NAD , [ x ] well appearing, [  ] Agitated, [  ] Drowsy,  [  ] Lethargy, [  ] confused   HEAD:  [ x ] Normal, [  ] Other  EYES:  [ x ] EOMI, [x  ] PERRLA, [ x ] conjunctiva and sclera clear normal, [  ] Other,  [x  ] Pallor,[  ] Discharge  ENMT:  [x  ] Normal, [ x ] Moist mucous membranes, [ x ] Good dentition, [ x ] No Thrush  NECK:  [x  ] Supple, [ x ] No JVD, [x  ] Normal thyroid, [  ] Lymphadenopathy [  ] Other  CHEST/LUNG:  [x  ] Clear to auscultation bilaterally, [ x ] Breath Sounds equal B/L,  [x  ] No rales, [x  ] No rhonchi  [ x ]  No wheezing  HEART:  [x  ] Regular rate and rhythm, [ x ] tachycardia, [  ] Bradycardia,  [  ] irregular  [ x] No murmurs, No rubs, No gallops, [  ] PPM in place (Mfr:  )  ABDOMEN:  [x  ] Soft, [ x ] Nontender, [x  ] Nondistended, [x  ] No mass, [x  ] Bowel sounds present, [ x ] obese  NERVOUS SYSTEM:  [x  ] Alert & Oriented X3, [ x ] Nonfocal  [  ] Confusion  [  ] Encephalopathic [  ] Sedated [  ] Unable to assess, [  ] Other-  EXTREMITIES: [ x ] 2+ Peripheral Pulses, No clubbing, No cyanosis,  [  ] edema B/L lower EXT. [  ] PVD stasis skin changes B/L Lower EXT  LYMPH: No lymphadenopathy noted  SKIN:  [ x ] No rashes or lesions, [  ] Pressure Ulcers, [  ] ecchymosis, [  ] Skin Tears, [  ] Other    DIET: NPO    LABS:                        8.4    15.98 )-----------( 97       ( 02 Dec 2018 12:52 )             23.9     02 Dec 2018 12:52    141    |  109    |  50     ----------------------------<  309    4.6     |  22     |  1.30     Ca    7.7        02 Dec 2018 12:52  Phos  4.0       02 Dec 2018 08:40  Mg     1.7       02 Dec 2018 08:40    TPro  4.0    /  Alb  1.8    /  TBili  3.3    /  DBili  1.20   /  AST  29     /  ALT  20     /  AlkPhos  72     02 Dec 2018 08:40    PT/INR - ( 02 Dec 2018 12:52 )   PT: 18.5 sec;   INR: 1.60 ratio         PTT - ( 02 Dec 2018 12:52 )  PTT:30.3 sec  Lactate, Blood (12.02.18 @ 08:40)    Lactate, Blood: 8.2 mmol/L    Lactate, Blood (12..18 @ 12:52)    Lactate, Blood: 4.2 mmol/L    Hemoglobin A1C, Whole Blood (12.01.18 @ 10:32)    Hemoglobin A1C, Whole Blood: 8.3: Method: Immunoassay       Reference Range                4.0-5.6%       High risk (prediabetic)        5.7-6.4%       Diabetic, diagnostic             >=6.5%       ADA diabetic treatment goal       <7.0%  The Hemoglobin A1c testing is NGSP-certified.Reference ranges are based  upon the 2010 recommendations of  the American Diabetes Association.  Interpretation may vary for children  and adolescents. %        Urinalysis Basic - ( 2018 18:30 )    Color: Yellow / Appearance: Slightly Turbid / S.015 / pH: x  Gluc: x / Ketone: Trace  / Bili: Small / Urobili: Negative   Blood: x / Protein: 25 mg/dL / Nitrite: Negative   Leuk Esterase: Trace / RBC: 6-10 /HPF / WBC 3-5   Sq Epi: x / Non Sq Epi: Occasional / Bacteria: Occasional        Culture Results:   No growth to date. ( @ 20:55)  Culture Results:   No growth to date. ( @ 20:55)  Culture Results:   <10,000 CFU/ml Normal Urogenital ninfa present ( @ 20:19)      culture blood  -- .Blood Blood  @ 20:55    culture urine  --   @ 20:55  culture blood  -- .Urine Clean Catch (Midstream)  @ 20:19    culture urine  --   @ 20:19      CARDIAC MARKERS ( 02 Dec 2018 08:40 )  x     / x     / 504 U/L / x     / x      CARDIAC MARKERS ( 2018 23:51 )  .019 ng/mL / x     / 301 U/L / x     / 2.8 ng/mL  CARDIAC MARKERS ( 2018 16:25 )  .019 ng/mL / x     / x     / x     / x          Culture - Blood (collected 2018 20:55)  Source: .Blood Blood  Preliminary Report (01 Dec 2018 21:01):    No growth to date.    Culture - Blood (collected 2018 20:55)  Source: .Blood Blood  Preliminary Report (01 Dec 2018 21:01):    No growth to date.    Culture - Urine (collected 2018 20:19)  Source: .Urine Clean Catch (Midstream)  Final Report (01 Dec 2018 15:16):    <10,000 CFU/ml Normal Urogenital ninfa present         Lipase, Serum: 70 U/L (18 @ 16:25)      RADIOLOGY & ADDITIONAL TESTS:  < from: Xray Chest 1 View- PORTABLE-Urgent (18 @ 23:39) >    EXAM:  XR CHEST PORTABLE URGENT 1V                            PROCEDURE DATE:  2018          INTERPRETATION:  Portable chest radiograph       CLINICAL INFORMATION: Line placement. GI bleeding.    TECHNIQUE:  Single portable frontal AP view ofthe chest was obtained.    FINDINGS: The examination of 10/17/2018 is available for review.    The lungs are free of infiltrate.     No pleural abnormality is seen.      The heart and mediastinum appear unchanged. The tip of the right IJ line   is projecting over the superior vena cava    No destructive lesion is seen in the visualized skeleton.      IMPRESSION:   No infiltrate.          < end of copied text >      HEALTH ISSUES - PROBLEM Dx:  GIB (gastrointestinal bleeding): GIB (gastrointestinal bleeding)  Lactic acid blood increased: Lactic acid blood increased  Prophylactic measure: Prophylactic measure  Pedal edema: Pedal edema  Neuropathy: Neuropathy  Hypercholesteremia: Hypercholesteremia  Hypertension: Hypertension  Fatty liver disease, nonalcoholic: Fatty liver disease, nonalcoholic  Hepatic encephalopathy: Hepatic encephalopathy  Diabetes: Diabetes  Near syncope: Near syncope  Vomiting: Vomiting      Consultant(s) Notes Reviewed:  [ x ] YES     Care Discussed with [X] Consultants  [  x] Patient  [  ] Family  [  ]   [  ] Social Service  [ x ] RN, [  ] Physical Therapy  DVT PPX: [  ] Lovenox, [  ] S C Heparin, [  ] Coumadin, [  ] Xarelto, [  ] Eliquis, [  ] Pradaxa, [  ] IV Heparin drip, [ x ] SCD [  ] Contraindication 2 to GI Bleed,[  ] Ambulation  Advanced directive: [x  ] None, [  ] DNR/DNI Patient is a 62y old  Male who presents with a chief complaint of vomiting and near syncope, upper GI bleeding (02 Dec 2018 13:00)      INTERVAL HPI:  Pt is a 63 y/o M with PMHx of Type 2 DM, HTN, HLD, obesity, nephrolithiasis (last stone 25 years ago), neuropathy, HE, NAFLD, Hyper ammonemia, recent Gastritis/ esophagitis on EGD & Gram variable teddy bacteremia in 10/2018  who presents with vomiting and  near syncope/falling out of a moving car. Pt [place] and [place]", his vision was cloudy, he pulled over the car and stopped and vomited. Vomitus was "creamy white." Pt denies seeing blood in vomitus. His dtr then took over driving and Pt again felt like he needed to vomit and told dtr to pull over. He thought the car had stopped and he opened the door and fell out of the moving car. He laid on the ground for ~ 15 minutes. Pt says he possibly lost consciousness. States when he was lying on the ground felt very cold. Pt has had "terrible heartburn" for 2 days and felt chills for 2 days. Pt denies abdominal pain, CP, palp, SOB, cough, myalgias, rash, HA. Pt admits to chills, vomiting (4 episodes), diarrhea (from lactulose), dizziness, pedal edema, tingling in hands (chronic, intermittent). Pt denies recent travel, sick contacts, different eating patterns.    Pt states that early this week pt saw her Endocrinologist & started back on Metformin 500 mg daily as per Dr's recommendation.  In ED Pt's vitals were: T(C): 36.3, HR: 91, BP: 85/59, RR: 15, SpO2: 98% on RA  Labs significant for lactate 5.6, , Hgb 10.4, total bili 3.7, alk phos 183, ammonia 72, Mg 1.4. UA - trace LE, mod blood, RBC 6-10, urine glucose 1,000.   CXR - negative chest  Abd Xray - There is mild content in the colon and no overt sense of bowel obstruction.  CT head - no intracranial hemorrhage or mass effect  CT cervical - Multilevel degenerative disc disease/cervical spondylosis, with posterior osteophyte disc complex C6-7 resulting in flattening of the cervical cord. If there is a clinical concern for spinal cord injury, recommend further evaluation with MRI if there are no clinical contraindications.  EKG - NSR, 93bpm    Pt given 2L NS bolus, Zofran pantoprazole, famotidine, Carafate , Pt seen by GI Dr Morales in ER & was admitted for further Eval.    18: Pt seen, examined, Had total 4 bloody BM so far , Lactate improved, pt had CT A/P with IV Contrast. Drop in H/H. plan for, start Protonix drip,  2 u pRBC 1 u FFP & 5 mg Vit K x 1 as d/w Dr Franks -GI, Elevated ammonia level. Coagulopathy, Improving Lactate. Ac Blood loss Anemia.    18: Pt seen, Examined, pt was transferred to ICU on 18 for symptomatic upper GI Bleed, S/P 7  units pRBC in Total , 3 FFP, 1 platelets transfusion given, Pt had emergent EGD done that found Ac Bleeding Duodenal ulcer found. S/P Epi given. Homeostasis obtained, Pt seen By Sx Dr Hernandez. H/H Stable,  today with MISA, Coagulopathy , Leukocytosis & High Lactate level.  H/H Low stable with Ac Blood  Loss Anemia. Hematology Dr Rios called, as pt is followed by Dr Rios- H/O as out pt.      OVERNIGHT EVENTS: GI Bleed with S/P EGD with bleeding  Duodenal Ulcer  & multiple pRBC transfusions,     Home Medications:  Bacid (LAC) oral tablet: 1 tab(s) orally 2 times a day for  2weeks (2018 19:41)  HumaLOG 100 units/mL subcutaneous solution: 20 unit(s) subcutaneous 2 times a day(breakfast and lunch) (2018 19:41)  HumaLOG 100 units/mL subcutaneous solution: 30 unit(s) subcutaneous once a day(@dinner) (2018 19:41)  irbesartan 300 mg oral tablet: 1 tab(s) orally once a day (2018 19:41)  lactulose 10 g/15 mL oral syrup: 15 milliliter(s) orally 3 times a day (2018 19:41)  Lasix 20 mg oral tablet: 1 tab(s) orally once a day (2018 19:41)  pantoprazole 40 mg oral delayed release tablet: 1 tab(s) orally once a day (2018 19:41)  phytonadione 100 mcg oral tablet: 1 tab(s) orally once a day (2018 19:41)  potassium chloride 10 mEq oral tablet, extended release: 1 tab(s) orally once a day (2018 19:41)  pravastatin 40 mg oral tablet: 1 tab(s) orally once a day (2018 19:41)  Vitamin D3 2000 intl units oral tablet: 1 tab(s) orally once a day (2018 19:41)      MEDICATIONS  (STANDING):  dextrose 5%. 1000 milliLiter(s) (50 mL/Hr) IV Continuous <Continuous>  dextrose 50% Injectable 12.5 Gram(s) IV Push once  dextrose 50% Injectable 25 Gram(s) IV Push once  dextrose 50% Injectable 25 Gram(s) IV Push once  gabapentin 200 milliGRAM(s) Oral four times a day  insulin glargine Injectable (LANTUS) 22 Unit(s) SubCutaneous at bedtime  insulin lispro (HumaLOG) corrective regimen sliding scale   SubCutaneous every 6 hours  pantoprazole Infusion 8 mG/Hr (10 mL/Hr) IV Continuous <Continuous>  rifaximin 550 milliGRAM(s) Oral two times a day  sucralfate 1 Gram(s) Oral every 6 hours  ursodiol Capsule 300 milliGRAM(s) Oral three times a day    MEDICATIONS  (PRN):  dextrose 40% Gel 15 Gram(s) Oral once PRN Blood Glucose LESS THAN 70 milliGRAM(s)/deciliter  glucagon  Injectable 1 milliGRAM(s) IntraMuscular once PRN Glucose LESS THAN 70 milligrams/deciliter      Allergies    codeine (Anaphylaxis)    Intolerances        REVIEW OF SYSTEMS: feel very tired  CONSTITUTIONAL: No fever, No chills, No fatigue, No myalgia, No Body ache, No Weakness  EYES: No eye pain,  No visual disturbances, No discharge, NO Redness  ENMT:  No ear pain, No nose bleed, No vertigo; No sinus or throat pain, No Congestion  NECK: No pain, No stiffness  RESPIRATORY: No cough, wheezing, No  hemoptysis, No shortness of breath  CARDIOVASCULAR: No chest pain, palpitations  GASTROINTESTINAL: No abdominal or epigastric pain. No nausea, No vomiting; No diarrhea or constipation. [  ] BM  GENITOURINARY: No dysuria, No frequency, No urgency, No hematuria, or incontinence  NEUROLOGICAL: No headaches, No dizziness, No numbness, No tingling, No tremors, No weakness  EXT: No Swelling, No Pain, No Edema  SKIN:  [ x ] No itching, burning, rashes, or lesions   MUSCULOSKELETAL: No joint pain or swelling; No muscle pain, No back pain, No extremity pain  PSYCHIATRIC: No depression, anxiety, mood swings or difficulty sleeping at night  PAIN SCALE: [x  ] None  [  ] Other-  ROS Unable to obtain due to - [  ] Dementia  [  ] Lethargy  [  ] Sedated [  ] non verbal  REST OF REVIEW Of SYSTEM - [ x ] Normal     Vital Signs Last 24 Hrs  T(C): 36.8 (02 Dec 2018 11:58), Max: 36.8 (02 Dec 2018 11:58)  T(F): 98.2 (02 Dec 2018 11:58), Max: 98.2 (02 Dec 2018 11:58)  HR: 111 (02 Dec 2018 12:28) (92 - 134)  BP: 115/53 (02 Dec 2018 12:00) (79/51 - 144/66)  BP(mean): 76 (02 Dec 2018 12:00) (58 - 95)  RR: 20 (02 Dec 2018 12:28) (10 - 51)  SpO2: 99% (02 Dec 2018 12:28) (95% - 100%)  Finger Stick         @ :  -   @ 07:00  --------------------------------------------------------  IN: 1914 mL / OUT: 1030 mL / NET: 884 mL     @ 07:  -   @ 13:15  --------------------------------------------------------  IN: 50 mL / OUT: 220 mL / NET: -170 mL        PHYSICAL EXAM: Rt IJ TLC  GENERAL:  [x  ] NAD , [ x ] well appearing, [  ] Agitated, [  ] Drowsy,  [  ] Lethargy, [  ] confused   HEAD:  [ x ] Normal, [  ] Other  EYES:  [ x ] EOMI, [x  ] PERRLA, [ x ] conjunctiva and sclera clear normal, [  ] Other,  [x  ] Pallor,[  ] Discharge  ENMT:  [x  ] Normal, [ x ] Moist mucous membranes, [ x ] Good dentition, [ x ] No Thrush  NECK:  [x  ] Supple, [ x ] No JVD, [x  ] Normal thyroid, [  ] Lymphadenopathy [  ] Other  CHEST/LUNG:  [x  ] Clear to auscultation bilaterally, [ x ] Breath Sounds equal B/L,  [x  ] No rales, [x  ] No rhonchi  [ x ]  No wheezing  HEART:  [x  ] Regular rate and rhythm, [ x ] tachycardia, [  ] Bradycardia,  [  ] irregular  [ x] No murmurs, No rubs, No gallops, [  ] PPM in place (Mfr:  )  ABDOMEN:  [x  ] Soft, [ x ] Nontender, [x  ] Nondistended, [x  ] No mass, [x  ] Bowel sounds present, [ x ] obese  NERVOUS SYSTEM:  [x  ] Alert & Oriented X3, [ x ] Nonfocal  [  ] Confusion  [  ] Encephalopathic [  ] Sedated [  ] Unable to assess, [  ] Other-  EXTREMITIES: [ x ] 2+ Peripheral Pulses, No clubbing, No cyanosis,  [  ] edema B/L lower EXT. [  ] PVD stasis skin changes B/L Lower EXT  LYMPH: No lymphadenopathy noted  SKIN:  [ x ] No rashes or lesions, [  ] Pressure Ulcers, [  ] ecchymosis, [  ] Skin Tears, [  ] Other    DIET: NPO    LABS:                        8.4    15.98 )-----------( 97       ( 02 Dec 2018 12:52 )             23.9     02 Dec 2018 12:52    141    |  109    |  50     ----------------------------<  309    4.6     |  22     |  1.30     Ca    7.7        02 Dec 2018 12:52  Phos  4.0       02 Dec 2018 08:40  Mg     1.7       02 Dec 2018 08:40    TPro  4.0    /  Alb  1.8    /  TBili  3.3    /  DBili  1.20   /  AST  29     /  ALT  20     /  AlkPhos  72     02 Dec 2018 08:40    PT/INR - ( 02 Dec 2018 12:52 )   PT: 18.5 sec;   INR: 1.60 ratio         PTT - ( 02 Dec 2018 12:52 )  PTT:30.3 sec  Lactate, Blood (12..18 @ 08:40)    Lactate, Blood: 8.2 mmol/L    Lactate, Blood (..18 @ 12:52)    Lactate, Blood: 4.2 mmol/L    Hemoglobin A1C, Whole Blood (.18 @ 10:32)    Hemoglobin A1C, Whole Blood: 8.3: Method: Immunoassay       Reference Range                4.0-5.6%       High risk (prediabetic)        5.7-6.4%       Diabetic, diagnostic             >=6.5%       ADA diabetic treatment goal       <7.0%  The Hemoglobin A1c testing is NGSP-certified.Reference ranges are based  upon the 2010 recommendations of  the American Diabetes Association.  Interpretation may vary for children  and adolescents. %        Urinalysis Basic - ( 2018 18:30 )    Color: Yellow / Appearance: Slightly Turbid / S.015 / pH: x  Gluc: x / Ketone: Trace  / Bili: Small / Urobili: Negative   Blood: x / Protein: 25 mg/dL / Nitrite: Negative   Leuk Esterase: Trace / RBC: 6-10 /HPF / WBC 3-5   Sq Epi: x / Non Sq Epi: Occasional / Bacteria: Occasional        Culture Results:   No growth to date. ( @ 20:55)  Culture Results:   No growth to date. ( @ 20:55)  Culture Results:   <10,000 CFU/ml Normal Urogenital ninfa present ( @ 20:19)      culture blood  -- .Blood Blood  @ 20:55    culture urine  --   @ 20:55  culture blood  -- .Urine Clean Catch (Midstream)  @ 20:19    culture urine  --   @ 20:19      CARDIAC MARKERS ( 02 Dec 2018 08:40 )  x     / x     / 504 U/L / x     / x      CARDIAC MARKERS ( 2018 23:51 )  .019 ng/mL / x     / 301 U/L / x     / 2.8 ng/mL  CARDIAC MARKERS ( 2018 16:25 )  .019 ng/mL / x     / x     / x     / x          Culture - Blood (collected 2018 20:55)  Source: .Blood Blood  Preliminary Report (01 Dec 2018 21:01):    No growth to date.    Culture - Blood (collected 2018 20:55)  Source: .Blood Blood  Preliminary Report (01 Dec 2018 21:01):    No growth to date.    Culture - Urine (collected 2018 20:19)  Source: .Urine Clean Catch (Midstream)  Final Report (01 Dec 2018 15:16):    <10,000 CFU/ml Normal Urogenital ninfa present         Lipase, Serum: 70 U/L (18 @ 16:25)      RADIOLOGY & ADDITIONAL TESTS:  < from: Xray Chest 1 View- PORTABLE-Urgent (18 @ 23:39) >    EXAM:  XR CHEST PORTABLE URGENT 1V                            PROCEDURE DATE:  2018          INTERPRETATION:  Portable chest radiograph       CLINICAL INFORMATION: Line placement. GI bleeding.    TECHNIQUE:  Single portable frontal AP view ofthe chest was obtained.    FINDINGS: The examination of 10/17/2018 is available for review.    The lungs are free of infiltrate.     No pleural abnormality is seen.      The heart and mediastinum appear unchanged. The tip of the right IJ line   is projecting over the superior vena cava    No destructive lesion is seen in the visualized skeleton.      IMPRESSION:   No infiltrate.          < end of copied text >      HEALTH ISSUES - PROBLEM Dx:  GIB (gastrointestinal bleeding): GIB (gastrointestinal bleeding)  Lactic acid blood increased: Lactic acid blood increased  Prophylactic measure: Prophylactic measure  Pedal edema: Pedal edema  Neuropathy: Neuropathy  Hypercholesteremia: Hypercholesteremia  Hypertension: Hypertension  Fatty liver disease, nonalcoholic: Fatty liver disease, nonalcoholic  Hepatic encephalopathy: Hepatic encephalopathy  Diabetes: Diabetes  Near syncope: Near syncope  Vomiting: Vomiting      Consultant(s) Notes Reviewed:  [ x ] YES     Care Discussed with [X] Consultants  [  x] Patient  [  ] Family  [  ]   [  ] Social Service  [ x ] RN, [  ] Physical Therapy  DVT PPX: [  ] Lovenox, [  ] S C Heparin, [  ] Coumadin, [  ] Xarelto, [  ] Eliquis, [  ] Pradaxa, [  ] IV Heparin drip, [ x ] SCD [  ] Contraindication 2 to GI Bleed,[  ] Ambulation  Advanced directive: [x  ] None, [  ] DNR/DNI

## 2018-12-02 NOTE — DIETITIAN INITIAL EVALUATION ADULT. - NS AS NUTRI INTERV MEALS SNACK3
When medically feasible recommend clear liquid consistent carb,dash/tlc diet & advance as tolerated to Consistent Carb, Dash/TLC, soft, GERD precautions. Will monitor NH3 level with protein restrictions as medically necessary./Other (specify)

## 2018-12-02 NOTE — PROGRESS NOTE ADULT - PROBLEM SELECTOR PLAN 8
- hypotensive improved  after IV Fluid boluses 2/2 GI Bleed   - hold BP meds for now  - on irbesartan 300mg QD at home, resume therapeutic ARB equivalent if BP becomes elevated

## 2018-12-02 NOTE — DIETITIAN INITIAL EVALUATION ADULT. - PROBLEM SELECTOR PLAN 2
? etiology; hx of PUD, Esophagitis, Gastritis, non bleeding   - consult GI (Carmen), D/W at detail.  - c/w Zofran q6hr, IV Protonix  BID, Carafate TID  - clear liquid diet, advance as tolerated  - IVF 60cc/hr NS

## 2018-12-02 NOTE — DIETITIAN INITIAL EVALUATION ADULT. - PROBLEM SELECTOR PLAN 10
on Lasix 20mg QD and KlorCon 10mEq QD  - hold Lasix in setting of hypotension, resume Lasix and KlorCon if BP tolerates it    VTE ppx with lovenox

## 2018-12-02 NOTE — PROGRESS NOTE ADULT - SUBJECTIVE AND OBJECTIVE BOX
Neurology follow up note    JESSICA [id number]      Interval History:    Patient feels ok no new complaints.    MEDICATIONS    dextrose 40% Gel 15 Gram(s) Oral once PRN  dextrose 5%. 1000 milliLiter(s) IV Continuous <Continuous>  dextrose 50% Injectable 12.5 Gram(s) IV Push once  dextrose 50% Injectable 25 Gram(s) IV Push once  dextrose 50% Injectable 25 Gram(s) IV Push once  gabapentin 200 milliGRAM(s) Oral four times a day  glucagon  Injectable 1 milliGRAM(s) IntraMuscular once PRN  insulin glargine Injectable (LANTUS) 22 Unit(s) SubCutaneous at bedtime  insulin lispro (HumaLOG) corrective regimen sliding scale   SubCutaneous every 6 hours  pantoprazole Infusion 8 mG/Hr IV Continuous <Continuous>  rifaximin 550 milliGRAM(s) Oral two times a day  sucralfate 1 Gram(s) Oral every 6 hours  ursodiol Capsule 300 milliGRAM(s) Oral three times a day      Allergies    codeine (Anaphylaxis)    Intolerances            Vital Signs Last 24 Hrs  T(C): 36.8 (02 Dec 2018 11:58), Max: 36.8 (02 Dec 2018 11:58)  T(F): 98.2 (02 Dec 2018 11:58), Max: 98.2 (02 Dec 2018 11:58)  HR: 111 (02 Dec 2018 12:28) (92 - 134)  BP: 115/53 (02 Dec 2018 12:00) (79/51 - 144/66)  BP(mean): 76 (02 Dec 2018 12:00) (58 - 95)  RR: 20 (02 Dec 2018 12:28) (10 - 51)  SpO2: 99% (02 Dec 2018 12:28) (95% - 100%)    REVIEW OF SYSTEMS:  Constitutional: No fever, chills, fatigue, generalized weakness  Eyes: no eye pain, visual disturbances, or discharge  ENT:  No difficulty hearing, tinnitus, vertigo; No sinus or throat pain  Neck: No pain or stiffness  Respiratory: No cough, dyspnea, wheezing   Cardiovascular: No chest pain, palpitations,   Gastrointestinal: No abdominal or epigastric pain. No nausea, vomiting  No diarrhea or constipation.   Genitourinary: No dysuria, frequency, hematuria or incontinence  Neurological: No headaches, postive lightheadedness, no vertigo, numbness or tremors  Psychiatric: No depression, anxiety, mood swings or difficulty sleeping  Musculoskeletal: No joint pain or swelling; No muscle, back or extremity pain  Skin: No itching, burning, rashes or lesions   Lymph Nodes: No enlarged glands  Endocrine: No heat or cold intolerance; No hair loss   Allergy and Immunologic: No hives or eczema    On Neurological Examination:    The patient is awake, alert, oriented x3.    Extraocular movements were intact.    Pupils equal, round and reactive bilaterally, 3 mm to 2.    Speech was fluent.  Smile symmetric.    Motor:  Bilateral upper and lower 5/5.    Sensory:  Bilateral upper and lower intact to light touch.  No drift.  Finger-to-nose within normal limits.    Follow simple commands      GENERAL Exam: Nontoxic , No Acute Distress   	  HEENT:  normocephalic, atraumatic  		  LUNGS: Clear bilaterally    	  HEART: Normal S1S2   No murmur RRR        	  GI/ ABDOMEN:  Soft  Non tender    EXTREMITIES:   No Edema  No Clubbing  No Cyanosis     MUSCULOSKELETAL: decreased Range of Motion all 4 extremities   	   SKIN: Normal  No Ecchymosis               LABS:  CBC Full  -  ( 02 Dec 2018 08:40 )  WBC Count : 16.55 K/uL  Hemoglobin : 8.5 g/dL  Hematocrit : 24.3 %  Platelet Count - Automated : 97 K/uL  Mean Cell Volume : 88.7 fl  Mean Cell Hemoglobin : 31.0 pg  Mean Cell Hemoglobin Concentration : 35.0 gm/dL  Auto Neutrophil # : 13.53 K/uL  Auto Lymphocyte # : 1.81 K/uL  Auto Monocyte # : 1.04 K/uL  Auto Eosinophil # : 0.00 K/uL  Auto Basophil # : 0.03 K/uL  Auto Neutrophil % : 81.8 %  Auto Lymphocyte % : 10.9 %  Auto Monocyte % : 6.3 %  Auto Eosinophil % : 0.0 %  Auto Basophil % : 0.2 %    Urinalysis Basic - ( 2018 18:30 )    Color: Yellow / Appearance: Slightly Turbid / S.015 / pH: x  Gluc: x / Ketone: Trace  / Bili: Small / Urobili: Negative   Blood: x / Protein: 25 mg/dL / Nitrite: Negative   Leuk Esterase: Trace / RBC: 6-10 /HPF / WBC 3-5   Sq Epi: x / Non Sq Epi: Occasional / Bacteria: Occasional      12-02    142  |  110<H>  |  49<H>  ----------------------------<  316<H>  4.8   |  19<L>  |  1.50<H>    Ca    8.1<L>      02 Dec 2018 08:40  Phos  4.0     12-  Mg     1.7     12    TPro  4.0<L>  /  Alb  1.8<L>  /  TBili  3.3<H>  /  DBili  1.20<H>  /  AST  29  /  ALT  20  /  AlkPhos  72  12-    Hemoglobin A1C:     LIVER FUNCTIONS - ( 02 Dec 2018 08:40 )  Alb: 1.8 g/dL / Pro: 4.0 g/dL / ALK PHOS: 72 U/L / ALT: 20 U/L / AST: 29 U/L / GGT: x           Vitamin B12   PT/INR - ( 02 Dec 2018 08:40 )   PT: 19.3 sec;   INR: 1.67 ratio         PTT - ( 02 Dec 2018 08:40 )  PTT:117.6 sec      RADIOLOGY      ANALYSIS AND PLAN:  A 62-year-old with episode of loss of consciousness.  1.	For episode of loss of consciousness, suspect most likely a syncopal event with a prodrome of feeling lightheaded, blurry vision, warm and sweaty, nausea and vomiting, as per spouse looked pale and becoming in the emergency room hypotensive points towards cerebral hypoperfusion.  There is no clear sign on examination to suggest a new cerebrovascular accident has ensued and there is no clear history to suggest underlying epilepsy.  2.	I would recommend telemetry evaluation, rule out underlying arrhythmia.  3.	Monitor systolic blood pressure.  4.	For history of diabetes, would recommend strict control of the patient's blood sugars.  5.	For history of neuropathy, continue the patient on his gabapentin.  6.	For episode of spasms, I would check calcium and phosphorus levels.  Questionable this could be secondary to slightly low magnesium.  7.	Spoke with spouse, Ada, at bedside.  Her telephone number is [phone]. spoke to son today 18  8.	monitor H/H   9.	GI work up as needed S/P GI bleed      Greater than 45 minutes spent in direct patient care reviewing  the notes, lab data/ imaging , discussion with multidisciplinary team.

## 2018-12-02 NOTE — DIETITIAN INITIAL EVALUATION ADULT. - PROBLEM SELECTOR PLAN 3
initial 5.6, repeat after 2L NS boluses 4.0; NO leukocytosis, afebrile   Likely sec to re starting Metformin 500mg  daily since Tuesday -3d ago (left over medications), per pharmacy Pt has not picked up Rx for metformin in 3 months  - f/u RVP- neg  - f/u BCx, UCx, IV fluids   - give another 2L NS bolus, repeat lactate

## 2018-12-02 NOTE — PROGRESS NOTE ADULT - ASSESSMENT
:    62M Admitted to the ICU with:    1. Acute hemorrhagic shock  2. UGIB due to gastric ulcer  3. acute blood loss anemia    Plan:    Neuro: patient denies pain at this time. He is lethargic and complaints of intense thirst. Will attempt to maintain sleep/wake cycle.     CV:    PULM: patient is at high risk for pulmonary deterioration due to high volume resuscitation with blood products and shock state. At present, patient is maintaining o2 sat with nasal cannula. He is a full code. Aspiration precautions    GI/NUTRITION: NPO/ IVF, Protonix and carafate. GI follow up. May need to be rescoped if continued bleeding and poor response to blood products. Surgery is aware of the patient as well.     /RENAL:  Avoid nephrotoxic agents. Ensure adequate perfusion of kidneys via transfusion and hourly outputs via bob    ID: No abx needed at present time    HEME: Serial H/H to continue. Transfusions likely along with platelets and plasma    SKIN: local skin care, mobility to prevent breakdown    LINES: Currently has 3 PIV and a TLC. If HD status deteriorates will place cordis. Cordis was unavailable to facility overnight which is why a TLC was placed instead    PROPH: Hold DVT proph at present. Continue with protonix BID vs Gtt    ENDO: target BS < 140 in the critical illness setting    DISPO: Continue with ICU care. Patient is at high risk for deterioration as unsure if bleeding is adequately controlled. He needs serial CBC and continued blood transfusion with balanced resuscitation of factors and platelets as well    Critical Care spent excluding that time which is included in bundled care or on procedures = 45 minutes :    62M Admitted to the ICU with:    1. Acute hemorrhagic shock  2. UGIB due to gastric ulcer  3. acute blood loss anemia    Plan:    Neuro: patient denies pain at this time. He is lethargic and complaints of intense thirst. Will attempt to maintain sleep/wake cycle.     CV: Continue to trend lactate. Will require additional transfusions. Maintain MAP> 65. would like to avoid vasopressors in setting of hemorrhagic shock. CVP monitoring. Central venous O2 Sat all available for better analysis.     PULM: patient is at high risk for pulmonary deterioration due to high volume resuscitation with blood products and shock state. At present, patient is maintaining o2 sat with nasal cannula. He is a full code. Aspiration precautions    GI/NUTRITION: NPO/ IVF, Protonix and carafate. GI follow up. May need to be rescoped if continued bleeding and poor response to blood products. Surgery is aware of the patient as well.     /RENAL:  Avoid nephrotoxic agents. Ensure adequate perfusion of kidneys via transfusion and hourly outputs via bob    ID: No abx needed at present time    HEME: Serial H/H to continue. Transfusions likely along with platelets and plasma    SKIN: local skin care, mobility to prevent breakdown    LINES: Currently has 3 PIV and a TLC. If HD status deteriorates will place cordis. Cordis was unavailable to facility overnight which is why a TLC was placed instead    PROPH: Hold DVT proph at present. Continue with protonix BID vs Gtt    ENDO: target BS < 140 in the critical illness setting    DISPO: Continue with ICU care. Patient is at high risk for deterioration as unsure if bleeding is adequately controlled. He needs serial CBC and continued blood transfusion with balanced resuscitation of factors and platelets as well    Critical Care spent excluding that time which is included in bundled care or on procedures = 45 minutes

## 2018-12-02 NOTE — CONSULT NOTE ADULT - SUBJECTIVE AND OBJECTIVE BOX
INCOMPLETE  NOTE:   Pt seen and evaluated. Full note to follow.   CONSULTATION & DOCUMENTATION IN PROGRESS INCOMPLETE  NOTE:   Pt seen and evaluated. Full note to follow.   CONSULTATION & DOCUMENTATION IN PROGRESS      Patient is a 62y old  Male who presents with a chief complaint of vomiting and near syncope (02 Dec 2018 16:56)      HPI:  Pt is a 63 y/o M with PMHx of Type 2 DM, HTN, HLD, obesity, nephrolithiasis (last stone 25 years ago), neuropathy, HE, NAFLD, Hyper ammonemia, recent Gastritis/ esophagitis on EGD & Gram variable teddy bacteremia in 10/2018  who presents with vomiting and  near syncope/falling out of a moving car. Pt [place] and [place]", his vision was cloudy, he pulled over the car and stopped and vomited. Vomitus was "creamy white." Pt denies seeing blood in vomitus. His dtr then took over driving and Pt again felt like he needed to vomit and told dtr to pull over. He thought the car had stopped and he opened the door and fell out of the moving car. He laid on the ground for ~ 15 minutes. Pt says he possibly lost consciousness. States when he was lying on the ground felt very cold. Pt has had "terrible heartburn" for 2 days and felt chills for 2 days. Pt denies abdominal pain, CP, palp, SOB, cough, myalgias, rash, HA. Pt admits to chills, vomiting (4 episodes), diarrhea (from lactulose), dizziness, pedal edema, tingling in hands (chronic, intermittent). Pt denies recent travel, sick contacts, different eating patterns.    Pt states that early this week pt saw her Endocrinologist & started back on Metformin 500 mg daily as per Dr's recommendation.  In ED Pt's vitals were: T(C): 36.3, HR: 91, BP: 85/59, RR: 15, SpO2: 98% on RA  Labs significant for lactate 5.6, , Hgb 10.4, total bili 3.7, alk phos 183, ammonia 72, Mg 1.4. UA - trace LE, mod blood, RBC 6-10, urine glucose 1,000.   CXR - negative chest  Abd Xray - There is mild content in the colon and no overt sense of bowel obstruction.  CT head - no intracranial hemorrhage or mass effect  CT cervical - Multilevel degenerative disc disease/cervical spondylosis, with posterior osteophyte disc complex C6-7 resulting in flattening of the cervical cord. If there is a clinical concern for spinal cord injury, recommend further evaluation with MRI if there are no clinical contraindications.  EKG - NSR, 93bpm    Pt given 2L NS bolus, Zofran pantoprazole, famotidine, Carafate , Pt seen by GI Dr Morales in ER & was admitted for further Eval. (30 Nov 2018 18:20)      PAST MEDICAL & SURGICAL HISTORY:  GERD with esophagitis: Gastritis &amp; Non Bleeding Ulcers  Hepatic encephalopathy  Obesity  Fatty liver disease, nonalcoholic  Renal stones: 25 years ago  Hypertension  Neuropathy  Hypercholesteremia  Diabetes  S/P cholecystectomy      HEALTH ISSUES - PROBLEM Dx:  MISA (acute kidney injury): MISA (acute kidney injury)  GIB (gastrointestinal bleeding): GIB (gastrointestinal bleeding)  Lactic acid blood increased: Lactic acid blood increased  Prophylactic measure: Prophylactic measure  Pedal edema: Pedal edema  Neuropathy: Neuropathy  Hypercholesteremia: Hypercholesteremia  Hypertension: Hypertension  Fatty liver disease, nonalcoholic: Fatty liver disease, nonalcoholic  Hepatic encephalopathy: Hepatic encephalopathy  Diabetes: Diabetes  Near syncope: Near syncope  Vomiting: Vomiting          FAMILY HISTORY:  Family history of type 2 diabetes mellitus (Mother)  Family history of hypertension (Father)  Family history of stomach cancer (Father)        [SOCIAL HISTORY: ]     smoking:       EtOH:       illicit drugs:       occupation:       marital status:       Other:       [ALLERGIES/INTOLERANCES:]  Allergies    codeine (Anaphylaxis)    Intolerances          [MEDICATIONS]  MEDICATIONS  (STANDING):  dextrose 5%. 1000 milliLiter(s) (50 mL/Hr) IV Continuous <Continuous>  dextrose 50% Injectable 12.5 Gram(s) IV Push once  dextrose 50% Injectable 25 Gram(s) IV Push once  dextrose 50% Injectable 25 Gram(s) IV Push once  gabapentin 200 milliGRAM(s) Oral four times a day  insulin glargine Injectable (LANTUS) 22 Unit(s) SubCutaneous at bedtime  insulin lispro (HumaLOG) corrective regimen sliding scale   SubCutaneous every 6 hours  lactated ringers. 1000 milliLiter(s) (75 mL/Hr) IV Continuous <Continuous>  pantoprazole Infusion 8 mG/Hr (10 mL/Hr) IV Continuous <Continuous>  rifaximin 550 milliGRAM(s) Oral two times a day  sucralfate 1 Gram(s) Oral every 6 hours  ursodiol Capsule 300 milliGRAM(s) Oral three times a day    MEDICATIONS  (PRN):  dextrose 40% Gel 15 Gram(s) Oral once PRN Blood Glucose LESS THAN 70 milliGRAM(s)/deciliter  glucagon  Injectable 1 milliGRAM(s) IntraMuscular once PRN Glucose LESS THAN 70 milligrams/deciliter        [REVIEW OF SYSTEMS: ]  CONSTITUTIONAL: normal, no fever, no shakes, no chills   EYES: No eye pain, no visual disturbances, no discharge  ENMT:  no discharge  NECK: No pain, no stiffness  BREASTS: No pain, no masses, no nipple discharge  RESPIRATORY: No cough, no wheezing, no chills, no hemoptysis; No shortness of breath  CARDIOVASCULAR: No chest pain, no palpitations, no dizziness, no leg swelling  GASTROINTESTINAL: No abdominal or epigastric pain. No nausea, no vomiting, no hematemesis; No diarrhea , no constipation. No melena, no hematochezia.  GENITOURINARY: No dysuria, no frequency, no hematuria, no incontinence  NEUROLOGICAL: No headaches, no memory loss, no loss of strength, no numbness, no tremors  SKIN: No itching, no burning, no rashes, no lesions   LYMPH NODES: No enlarged glands  ENDOCRINE: No heat or cold intolerance; No hair loss  MUSCULOSKELETAL: No joint pain or swelling; No muscle, no back, no extremity pain  PSYCHIATRIC: No depression, no anxiety, no mood swings, no difficulty sleeping  HEME/LYMPH: No easy bruising, no bleeding gums    [VITALS SIGNS 24hrs]  Vital Signs Last 24 Hrs  T(C): 36.9 (02 Dec 2018 20:30), Max: 36.9 (02 Dec 2018 20:30)  T(F): 98.5 (02 Dec 2018 20:30), Max: 98.5 (02 Dec 2018 20:30)  HR: 118 (02 Dec 2018 20:00) (97 - 134)  BP: 134/63 (02 Dec 2018 20:00) (79/51 - 152/54)  BP(mean): 90 (02 Dec 2018 20:00) (58 - 113)  RR: 15 (02 Dec 2018 20:00) (10 - 135)  SpO2: 97% (02 Dec 2018 20:00) (96% - 100%)    [PHYSICAL EXAM]  General: adult in NAD,  WN,  WD.  HEENT: clear oropharynx, anicteric sclera, pink conjunctivae.  Neck: supple, no masses.  CV: normal S1S2, no murmur, no rubs, no gallops.  Lungs: clear to auscultation, no wheezes, no rales, no rhonchi.  Abdomen: soft, non-tender, non-distended, no hepatosplenomegaly, normal BS, no guarding.  Ext: no clubbing, no cyanosis, no edema.  Skin: no rashes,  no petechiae, no venous stasis changes.  Neuro: alert and oriented X3, no focal motor deficits.  LN: no SC CHELSEA.      [LABS:]                        8.2    10.80 )-----------( 100      ( 02 Dec 2018 16:58 )             23.3     CBC Full  -  ( 02 Dec 2018 16:58 )  WBC Count : 10.80 K/uL  Hemoglobin : 8.2 g/dL  Hematocrit : 23.3 %  Platelet Count - Automated : 100 K/uL  Mean Cell Volume : 86.9 fl  Mean Cell Hemoglobin : 30.6 pg  Mean Cell Hemoglobin Concentration : 35.2 gm/dL  Auto Neutrophil # : x  Auto Lymphocyte # : x  Auto Monocyte # : x  Auto Eosinophil # : x  Auto Basophil # : x  Auto Neutrophil % : x  Auto Lymphocyte % : x  Auto Monocyte % : x  Auto Eosinophil % : x  Auto Basophil % : x    12-02    138  |  106  |  49<H>  ----------------------------<  265<H>  4.3   |  24  |  1.20    Ca    7.9<L>      02 Dec 2018 16:58  Phos  4.0     12-02  Mg     1.7     12-02    TPro  4.0<L>  /  Alb  1.8<L>  /  TBili  3.3<H>  /  DBili  1.20<H>  /  AST  29  /  ALT  20  /  AlkPhos  72  12-02    PT/INR - ( 02 Dec 2018 12:52 )   PT: 18.5 sec;   INR: 1.60 ratio         PTT - ( 02 Dec 2018 12:52 )  PTT:30.3 sec  LIVER FUNCTIONS - ( 02 Dec 2018 08:40 )  Alb: 1.8 g/dL / Pro: 4.0 g/dL / ALK PHOS: 72 U/L / ALT: 20 U/L / AST: 29 U/L / GGT: x           CARDIAC MARKERS ( 02 Dec 2018 08:40 )  x     / x     / 504 U/L / x     / x      CARDIAC MARKERS ( 30 Nov 2018 23:51 )  .019 ng/mL / x     / 301 U/L / x     / 2.8 ng/mL          WBC  TREND (5 Days)  WBC Count: 10.80 K/uL (12-02 @ 16:58)  WBC Count: 15.98 K/uL (12-02 @ 12:52)  WBC Count: 16.55 K/uL (12-02 @ 08:40)  WBC Count: 8.42 K/uL (12-01 @ 20:46)  WBC Count: 7.41 K/uL (12-01 @ 08:58)  WBC Count: 6.50 K/uL (12-01 @ 05:09)  WBC Count: 5.34 K/uL (12-01 @ 01:01)  WBC Count: 7.17 K/uL (11-30 @ 16:25)    HGB  TREND (5 Days)  Hemoglobin: 8.2 g/dL (12-02 @ 16:58)  Hemoglobin: 8.4 g/dL (12-02 @ 12:52)  Hemoglobin: 8.5 g/dL (12-02 @ 08:40)  Hemoglobin: 6.2 g/dL (12-01 @ 20:46)  Hemoglobin: 7.0 g/dL (12-01 @ 14:38)  Hemoglobin: 8.4 g/dL (12-01 @ 08:58)  Hemoglobin: 8.3 g/dL (12-01 @ 05:09)  Hemoglobin: 8.6 g/dL (12-01 @ 01:01)  Hemoglobin: 10.4 g/dL (11-30 @ 16:25)    HCT  TREND (5 Days)  Hematocrit: 23.3 % (12-02 @ 16:58)  Hematocrit: 23.9 % (12-02 @ 12:52)  Hematocrit: 24.3 % (12-02 @ 08:40)  Hematocrit: 18.1 % (12-01 @ 20:46)  Hematocrit: 20.7 % (12-01 @ 14:38)  Hematocrit: 24.3 % (12-01 @ 08:58)  Hematocrit: 24.0 % (12-01 @ 05:09)  Hematocrit: 25.2 % (12-01 @ 01:01)  Hematocrit: 30.8 % (11-30 @ 16:25)    PLT  TREND (5 Days)  Platelet Count - Automated: 100 K/uL (12-02 @ 16:58)  Platelet Count - Automated: 97 K/uL (12-02 @ 12:52)  Platelet Count - Automated: 97 K/uL (12-02 @ 08:40)  Platelet Count - Automated: 122 K/uL (12-01 @ 20:46)  Platelet Count - Automated: 89 K/uL (12-01 @ 08:58)  Platelet Count - Automated: 90 K/uL (12-01 @ 05:09)  Platelet Count - Automated: 84 K/uL (12-01 @ 01:01)  Platelet Count - Automated: 109 K/uL (11-30 @ 16:25)        Ferritin, Serum: 96 ng/mL (10-26 @ 14:51)             Folate, Serum: 9.8 ng/mL (10-26 @ 14:51)       [RADIOLOGY & ADDITIONAL STUDIES:] INCOMPLETE  NOTE:   Pt seen and evaluated. Full note to follow.   CONSULTATION & DOCUMENTATION IN PROGRESS      Patient is a 62y old  Male who presents with a chief complaint of vomiting and near syncope (02 Dec 2018 16:56)      HPI:  Pt is a 63 y/o M with PMHx of Type 2 DM, HTN, HLD, obesity, nephrolithiasis (last stone 25 years ago), neuropathy, HE, NAFLD, Hyper ammonemia, recent Gastritis/ esophagitis on EGD & Gram variable teddy bacteremia in 10/2018  who presents with vomiting and  near syncope/falling out of a moving car. Pt [place] and [place]", his vision was cloudy, he pulled over the car and stopped and vomited. Vomitus was "creamy white." Pt denies seeing blood in vomitus. His dtr then took over driving and Pt again felt like he needed to vomit and told dtr to pull over. He thought the car had stopped and he opened the door and fell out of the moving car. He laid on the ground for ~ 15 minutes. Pt says he possibly lost consciousness. States when he was lying on the ground felt very cold. Pt has had "terrible heartburn" for 2 days and felt chills for 2 days. Pt denies abdominal pain, CP, palp, SOB, cough, myalgias, rash, HA. Pt admits to chills, vomiting (4 episodes), diarrhea (from lactulose), dizziness, pedal edema, tingling in hands (chronic, intermittent). Pt denies recent travel, sick contacts, different eating patterns.    Pt states that early this week pt saw her Endocrinologist & started back on Metformin 500 mg daily as per Dr's recommendation.  In ED Pt's vitals were: T(C): 36.3, HR: 91, BP: 85/59, RR: 15, SpO2: 98% on RA  Labs significant for lactate 5.6, , Hgb 10.4, total bili 3.7, alk phos 183, ammonia 72, Mg 1.4. UA - trace LE, mod blood, RBC 6-10, urine glucose 1,000.   CXR - negative chest  Abd Xray - There is mild content in the colon and no overt sense of bowel obstruction.  CT head - no intracranial hemorrhage or mass effect  CT cervical - Multilevel degenerative disc disease/cervical spondylosis, with posterior osteophyte disc complex C6-7 resulting in flattening of the cervical cord. If there is a clinical concern for spinal cord injury, recommend further evaluation with MRI if there are no clinical contraindications.  EKG - NSR, 93bpm    Pt given 2L NS bolus, Zofran pantoprazole, famotidine, Carafate , Pt seen by GI Dr Morales in ER & was admitted for further Eval. (30 Nov 2018 18:20)      Pt was admitted to medical floor foro observation and tx. Hgb initially 10.4, but overnight with acute bleeding. s/p 7U PRBC, 3U FFP and 1U SDP. Seen by surgeon Dr Hernandez, GI Dr lópez, and underwent EGD at 2AM with bleeding duodenal ulcer, s/p attempted clipping, and s/p control of bleeding with epinephrine injections. Now under ICU care.     Pt known to office. Hx of fe def anemia, last seen 11/07/18. Hgb 11.8 on that visit. On iron tabs QOD, no stomach upset.  61yo  M with hx of DM for 16yrs. Diabetic retinopathy ~ 4yrs, neuropathy for last 4yrs. Hgb A1C ~8.7. Watching diet. On Insulin, Jardance.  Developed encephalopathy ~02/2018, after super Bowl Sunday. Admitted to [facility]. Had CT scan while hospitalized. MRI at [facility],  with cirrhosis of liver.  No hx blood transfusions. No donations. No bleeding, no PUD. No family hx of hemochromatosis.  Wife's brother (brother-in-law) has hemochromatosis.  05/11/18 EGD : H pylori neg  05/11/18 Colonoscopy cecum polyp  06/19/18 Ferritin 24, Fe 90, TIBC 391, Sat 23%, TSH 1.53  06/19/18 Tbili 2.4, ALkPhos 294, AST 26, ALT 18  06/19/18 , ESR 25, CRP  06/19/18 SPEP neg, UPEP neg, Urine DELORES neg, Serum DELORES neg    family at bedside, spoke with pt sister, children and nephew, with Dr Williamson present .        PAST MEDICAL & SURGICAL HISTORY:  GERD with esophagitis: Gastritis &amp; Non Bleeding Ulcers  Hepatic encephalopathy  Obesity  Fatty liver disease, nonalcoholic  Renal stones: 25 years ago  Hypertension  Neuropathy  Hypercholesteremia  Diabetes  S/P cholecystectomy      HEALTH ISSUES - PROBLEM Dx:  MISA (acute kidney injury): MISA (acute kidney injury)  GIB (gastrointestinal bleeding): GIB (gastrointestinal bleeding)  Lactic acid blood increased: Lactic acid blood increased  Prophylactic measure: Prophylactic measure  Pedal edema: Pedal edema  Neuropathy: Neuropathy  Hypercholesteremia: Hypercholesteremia  Hypertension: Hypertension  Fatty liver disease, nonalcoholic: Fatty liver disease, nonalcoholic  Hepatic encephalopathy: Hepatic encephalopathy  Diabetes: Diabetes  Near syncope: Near syncope  Vomiting: Vomiting          FAMILY HISTORY:  Family history of type 2 diabetes mellitus (Mother)  Family history of hypertension (Father)  Family history of stomach cancer (Father)        [SOCIAL HISTORY: ]     smoking:  none     EtOH:  none     illicit drugs:  none     occupation:  construction mger     marital status:       Other:       [ALLERGIES/INTOLERANCES:]  Allergies      codeine (Anaphylaxis)  Intolerances          [MEDICATIONS]  MEDICATIONS  (STANDING):  dextrose 5%. 1000 milliLiter(s) (50 mL/Hr) IV Continuous <Continuous>  dextrose 50% Injectable 12.5 Gram(s) IV Push once  dextrose 50% Injectable 25 Gram(s) IV Push once  dextrose 50% Injectable 25 Gram(s) IV Push once  gabapentin 200 milliGRAM(s) Oral four times a day  insulin glargine Injectable (LANTUS) 22 Unit(s) SubCutaneous at bedtime  insulin lispro (HumaLOG) corrective regimen sliding scale   SubCutaneous every 6 hours  lactated ringers. 1000 milliLiter(s) (75 mL/Hr) IV Continuous <Continuous>  pantoprazole Infusion 8 mG/Hr (10 mL/Hr) IV Continuous <Continuous>  rifaximin 550 milliGRAM(s) Oral two times a day  sucralfate 1 Gram(s) Oral every 6 hours  ursodiol Capsule 300 milliGRAM(s) Oral three times a day    MEDICATIONS  (PRN):  dextrose 40% Gel 15 Gram(s) Oral once PRN Blood Glucose LESS THAN 70 milliGRAM(s)/deciliter  glucagon  Injectable 1 milliGRAM(s) IntraMuscular once PRN Glucose LESS THAN 70 milligrams/deciliter        [REVIEW OF SYSTEMS: ]  CONSTITUTIONAL: normal, +fever, no shakes, +chills   EYES: No eye pain, +visual disturbances, no discharge  ENMT:  no discharge  NECK: No pain, no stiffness  BREASTS: No pain, no masses, no nipple discharge  RESPIRATORY: No cough, no wheezing, no chills, no hemoptysis; No shortness of breath  CARDIOVASCULAR: No chest pain, no palpitations, no dizziness, no leg swelling  GASTROINTESTINAL: +abdominal or epigastric pain. +nausea, +vomiting, no hematemesis; +diarrhea , no constipation. No melena, no hematochezia.  GENITOURINARY: No dysuria, no frequency, no hematuria, no incontinence  NEUROLOGICAL: No headaches, no memory loss, no loss of strength, no numbness, no tremors  SKIN: No itching, no burning, no rashes, no lesions   LYMPH NODES: No enlarged glands  ENDOCRINE: No heat or cold intolerance; No hair loss  MUSCULOSKELETAL: No joint pain or swelling; No muscle, no back, no extremity pain  PSYCHIATRIC: No depression, no anxiety, no mood swings, no difficulty sleeping  HEME/LYMPH: No easy bruising, no bleeding gums    [VITALS SIGNS 24hrs]  Vital Signs Last 24 Hrs  T(C): 36.9 (02 Dec 2018 20:30), Max: 36.9 (02 Dec 2018 20:30)  T(F): 98.5 (02 Dec 2018 20:30), Max: 98.5 (02 Dec 2018 20:30)  HR: 118 (02 Dec 2018 20:00) (97 - 134)  BP: 134/63 (02 Dec 2018 20:00) (79/51 - 152/54)  BP(mean): 90 (02 Dec 2018 20:00) (58 - 113)  RR: 15 (02 Dec 2018 20:00) (10 - 135)  SpO2: 97% (02 Dec 2018 20:00) (96% - 100%)    [PHYSICAL EXAM]  General: adult in NAD,  WN,  WD.  HEENT: clear oropharynx, anicteric sclera, pink conjunctivae.  Neck: supple, no masses.  CV: normal S1S2, no murmur, no rubs, no gallops.  Lungs: clear to auscultation, no wheezes, no rales, no rhonchi.  Abdomen: soft, non-tender, non-distended, no hepatosplenomegaly, normal BS, no guarding.  Ext: no clubbing, no cyanosis, no edema.  Skin: no rashes,  no petechiae, no venous stasis changes.  Neuro: alert and oriented X3, no focal motor deficits.  LN: no SC CHELSEA.      [LABS:]                        8.2    10.80 )-----------( 100      ( 02 Dec 2018 16:58 )             23.3     CBC Full  -  ( 02 Dec 2018 16:58 )  WBC Count : 10.80 K/uL  Hemoglobin : 8.2 g/dL  Hematocrit : 23.3 %  Platelet Count - Automated : 100 K/uL  Mean Cell Volume : 86.9 fl  Mean Cell Hemoglobin : 30.6 pg  Mean Cell Hemoglobin Concentration : 35.2 gm/dL  Auto Neutrophil # : x  Auto Lymphocyte # : x  Auto Monocyte # : x  Auto Eosinophil # : x  Auto Basophil # : x  Auto Neutrophil % : x  Auto Lymphocyte % : x  Auto Monocyte % : x  Auto Eosinophil % : x  Auto Basophil % : x    12-02    138  |  106  |  49<H>  ----------------------------<  265<H>  4.3   |  24  |  1.20    Ca    7.9<L>      02 Dec 2018 16:58  Phos  4.0     12-02  Mg     1.7     12-02    TPro  4.0<L>  /  Alb  1.8<L>  /  TBili  3.3<H>  /  DBili  1.20<H>  /  AST  29  /  ALT  20  /  AlkPhos  72  12-02    PT/INR - ( 02 Dec 2018 12:52 )   PT: 18.5 sec;   INR: 1.60 ratio         PTT - ( 02 Dec 2018 12:52 )  PTT:30.3 sec  LIVER FUNCTIONS - ( 02 Dec 2018 08:40 )  Alb: 1.8 g/dL / Pro: 4.0 g/dL / ALK PHOS: 72 U/L / ALT: 20 U/L / AST: 29 U/L / GGT: x           CARDIAC MARKERS ( 02 Dec 2018 08:40 )  x     / x     / 504 U/L / x     / x      CARDIAC MARKERS ( 30 Nov 2018 23:51 )  .019 ng/mL / x     / 301 U/L / x     / 2.8 ng/mL          WBC  TREND (5 Days)  WBC Count: 10.80 K/uL (12-02 @ 16:58)  WBC Count: 15.98 K/uL (12-02 @ 12:52)  WBC Count: 16.55 K/uL (12-02 @ 08:40)  WBC Count: 8.42 K/uL (12-01 @ 20:46)  WBC Count: 7.41 K/uL (12-01 @ 08:58)  WBC Count: 6.50 K/uL (12-01 @ 05:09)  WBC Count: 5.34 K/uL (12-01 @ 01:01)  WBC Count: 7.17 K/uL (11-30 @ 16:25)    HGB  TREND (5 Days)  Hemoglobin: 8.2 g/dL (12-02 @ 16:58)  Hemoglobin: 8.4 g/dL (12-02 @ 12:52)  Hemoglobin: 8.5 g/dL (12-02 @ 08:40)  Hemoglobin: 6.2 g/dL (12-01 @ 20:46)  Hemoglobin: 7.0 g/dL (12-01 @ 14:38)  Hemoglobin: 8.4 g/dL (12-01 @ 08:58)  Hemoglobin: 8.3 g/dL (12-01 @ 05:09)  Hemoglobin: 8.6 g/dL (12-01 @ 01:01)  Hemoglobin: 10.4 g/dL (11-30 @ 16:25)    HCT  TREND (5 Days)  Hematocrit: 23.3 % (12-02 @ 16:58)  Hematocrit: 23.9 % (12-02 @ 12:52)  Hematocrit: 24.3 % (12-02 @ 08:40)  Hematocrit: 18.1 % (12-01 @ 20:46)  Hematocrit: 20.7 % (12-01 @ 14:38)  Hematocrit: 24.3 % (12-01 @ 08:58)  Hematocrit: 24.0 % (12-01 @ 05:09)  Hematocrit: 25.2 % (12-01 @ 01:01)  Hematocrit: 30.8 % (11-30 @ 16:25)    PLT  TREND (5 Days)  Platelet Count - Automated: 100 K/uL (12-02 @ 16:58)  Platelet Count - Automated: 97 K/uL (12-02 @ 12:52)  Platelet Count - Automated: 97 K/uL (12-02 @ 08:40)  Platelet Count - Automated: 122 K/uL (12-01 @ 20:46)  Platelet Count - Automated: 89 K/uL (12-01 @ 08:58)  Platelet Count - Automated: 90 K/uL (12-01 @ 05:09)  Platelet Count - Automated: 84 K/uL (12-01 @ 01:01)  Platelet Count - Automated: 109 K/uL (11-30 @ 16:25)        Ferritin, Serum: 96 ng/mL (10-26 @ 14:51)  Iron with Total Binding Capacity (07.11.18 @ 18:48)    % Saturation, Iron: 56 %    Iron - Total Binding Capacity.: 457 ug/dL    Iron Total, Serum: 258 ug/dL    Unsaturated Iron Binding Capacity: 199 ug/dL    Vitamin B12, Serum in AM (02.06.18 @ 08:28)    Vitamin B12, Serum: 772: Note: Reference Range Change on 12/18/2017. pg/mL    Folate, Serum: 9.8 ng/mL (10-26 @ 14:51)       [RADIOLOGY & ADDITIONAL STUDIES:] Patient is a 62y old  Male who presents with a chief complaint of vomiting and near syncope (02 Dec 2018 16:56)      HPI:  Pt is a 63 y/o M with PMHx of Type 2 DM, HTN, HLD, obesity, nephrolithiasis (last stone 25 years ago), neuropathy, HE, NAFLD, Hyper ammonemia, recent Gastritis/ esophagitis on EGD & Gram variable teddy bacteremia in 10/2018  who presents with vomiting and  near syncope/falling out of a moving car. Pt [place] and [place]", his vision was cloudy, he pulled over the car and stopped and vomited. Vomitus was "creamy white." Pt denies seeing blood in vomitus. His dtr then took over driving and Pt again felt like he needed to vomit and told dtr to pull over. He thought the car had stopped and he opened the door and fell out of the moving car. He laid on the ground for ~ 15 minutes. Pt says he possibly lost consciousness. States when he was lying on the ground felt very cold. Pt has had "terrible heartburn" for 2 days and felt chills for 2 days. Pt denies abdominal pain, CP, palp, SOB, cough, myalgias, rash, HA. Pt admits to chills, vomiting (4 episodes), diarrhea (from lactulose), dizziness, pedal edema, tingling in hands (chronic, intermittent). Pt denies recent travel, sick contacts, different eating patterns.    Pt states that early this week pt saw her Endocrinologist & started back on Metformin 500 mg daily as per Dr's recommendation.  In ED Pt's vitals were: T(C): 36.3, HR: 91, BP: 85/59, RR: 15, SpO2: 98% on RA  Labs significant for lactate 5.6, , Hgb 10.4, total bili 3.7, alk phos 183, ammonia 72, Mg 1.4. UA - trace LE, mod blood, RBC 6-10, urine glucose 1,000.   CXR - negative chest  Abd Xray - There is mild content in the colon and no overt sense of bowel obstruction.  CT head - no intracranial hemorrhage or mass effect  CT cervical - Multilevel degenerative disc disease/cervical spondylosis, with posterior osteophyte disc complex C6-7 resulting in flattening of the cervical cord. If there is a clinical concern for spinal cord injury, recommend further evaluation with MRI if there are no clinical contraindications.  EKG - NSR, 93bpm    Pt given 2L NS bolus, Zofran pantoprazole, famotidine, Carafate , Pt seen by GI Dr Morales in ER & was admitted for further Eval. (30 Nov 2018 18:20)      Pt was admitted to medical floor foro observation and tx. Hgb initially 10.4, but overnight with acute bleeding. s/p 7U PRBC, 3U FFP and 1U SDP. Seen by surgeon Dr Hernandez, GI Dr lópez, and underwent EGD at 2AM with bleeding duodenal ulcer, s/p attempted clipping, and s/p control of bleeding with epinephrine injections. Now under ICU care.     Pt known to office. Hx of fe def anemia, last seen 11/07/18. Hgb 11.8 on that visit. On iron tabs QOD, no stomach upset.  61yo  M with hx of DM for 16yrs. Diabetic retinopathy ~ 4yrs, neuropathy for last 4yrs. Hgb A1C ~8.7. Watching diet. On Insulin, Jardance.  Developed encephalopathy ~02/2018, after super Bowl Sunday. Admitted to [facility]. Had CT scan while hospitalized. MRI at [facility],  with cirrhosis of liver.  No hx blood transfusions. No donations. No bleeding, no PUD. No family hx of hemochromatosis.  Wife's brother (brother-in-law) has hemochromatosis.  05/11/18 EGD : H pylori neg  05/11/18 Colonoscopy cecum polyp  06/19/18 Ferritin 24, Fe 90, TIBC 391, Sat 23%, TSH 1.53  06/19/18 Tbili 2.4, ALkPhos 294, AST 26, ALT 18  06/19/18 , ESR 25, CRP  06/19/18 SPEP neg, UPEP neg, Urine DELORES neg, Serum DELORES neg    family at bedside, spoke with pt sister, children and nephew, with Dr Williamson present .        PAST MEDICAL & SURGICAL HISTORY:  GERD with esophagitis: Gastritis &amp; Non Bleeding Ulcers  Hepatic encephalopathy  Obesity  Fatty liver disease, nonalcoholic  Renal stones: 25 years ago  Hypertension  Neuropathy  Hypercholesteremia  Diabetes  S/P cholecystectomy      HEALTH ISSUES - PROBLEM Dx:  MISA (acute kidney injury): MISA (acute kidney injury)  GIB (gastrointestinal bleeding): GIB (gastrointestinal bleeding)  Lactic acid blood increased: Lactic acid blood increased  Prophylactic measure: Prophylactic measure  Pedal edema: Pedal edema  Neuropathy: Neuropathy  Hypercholesteremia: Hypercholesteremia  Hypertension: Hypertension  Fatty liver disease, nonalcoholic: Fatty liver disease, nonalcoholic  Hepatic encephalopathy: Hepatic encephalopathy  Diabetes: Diabetes  Near syncope: Near syncope  Vomiting: Vomiting          FAMILY HISTORY:  Family history of type 2 diabetes mellitus (Mother)  Family history of hypertension (Father)  Family history of stomach cancer (Father)        [SOCIAL HISTORY: ]     smoking:  none     EtOH:  none     illicit drugs:  none     occupation:  [employer]     marital status:       Other:       [ALLERGIES/INTOLERANCES:]  Allergies      codeine (Anaphylaxis)  Intolerances          [MEDICATIONS]  MEDICATIONS  (STANDING):  dextrose 5%. 1000 milliLiter(s) (50 mL/Hr) IV Continuous <Continuous>  dextrose 50% Injectable 12.5 Gram(s) IV Push once  dextrose 50% Injectable 25 Gram(s) IV Push once  dextrose 50% Injectable 25 Gram(s) IV Push once  gabapentin 200 milliGRAM(s) Oral four times a day  insulin glargine Injectable (LANTUS) 22 Unit(s) SubCutaneous at bedtime  insulin lispro (HumaLOG) corrective regimen sliding scale   SubCutaneous every 6 hours  lactated ringers. 1000 milliLiter(s) (75 mL/Hr) IV Continuous <Continuous>  pantoprazole Infusion 8 mG/Hr (10 mL/Hr) IV Continuous <Continuous>  rifaximin 550 milliGRAM(s) Oral two times a day  sucralfate 1 Gram(s) Oral every 6 hours  ursodiol Capsule 300 milliGRAM(s) Oral three times a day    MEDICATIONS  (PRN):  dextrose 40% Gel 15 Gram(s) Oral once PRN Blood Glucose LESS THAN 70 milliGRAM(s)/deciliter  glucagon  Injectable 1 milliGRAM(s) IntraMuscular once PRN Glucose LESS THAN 70 milligrams/deciliter        [REVIEW OF SYSTEMS: ]  CONSTITUTIONAL: normal, +fever, no shakes, +chills   EYES: No eye pain, +visual disturbances, no discharge  ENMT:  no discharge  NECK: No pain, no stiffness  BREASTS: No pain, no masses, no nipple discharge  RESPIRATORY: No cough, no wheezing, no chills, no hemoptysis; No shortness of breath  CARDIOVASCULAR: No chest pain, no palpitations, no dizziness, no leg swelling  GASTROINTESTINAL: +abdominal or epigastric pain. +nausea, +vomiting, no hematemesis; +diarrhea , no constipation. No melena, no hematochezia.  GENITOURINARY: No dysuria, no frequency, no hematuria, no incontinence  NEUROLOGICAL: No headaches, no memory loss, no loss of strength, no numbness, no tremors  SKIN: No itching, no burning, no rashes, no lesions   LYMPH NODES: No enlarged glands  ENDOCRINE: No heat or cold intolerance; No hair loss  MUSCULOSKELETAL: No joint pain or swelling; No muscle, no back, no extremity pain  PSYCHIATRIC: No depression, no anxiety, no mood swings, no difficulty sleeping  HEME/LYMPH: No easy bruising, no bleeding gums    [VITALS SIGNS 24hrs]  Vital Signs Last 24 Hrs  T(C): 36.9 (02 Dec 2018 20:30), Max: 36.9 (02 Dec 2018 20:30)  T(F): 98.5 (02 Dec 2018 20:30), Max: 98.5 (02 Dec 2018 20:30)  HR: 118 (02 Dec 2018 20:00) (97 - 134)  BP: 134/63 (02 Dec 2018 20:00) (79/51 - 152/54)  BP(mean): 90 (02 Dec 2018 20:00) (58 - 113)  RR: 15 (02 Dec 2018 20:00) (10 - 135)  SpO2: 97% (02 Dec 2018 20:00) (96% - 100%)    [PHYSICAL EXAM]  General: adult in NAD,  WN,  WD.  HEENT: clear oropharynx, anicteric sclera, pink conjunctivae.  Neck: supple, no masses.  CV: normal S1S2, no murmur, no rubs, no gallops.  Lungs: clear to auscultation, no wheezes, no rales, no rhonchi.  Abdomen: soft, non-tender, non-distended, no hepatosplenomegaly, normal BS, no guarding.  Ext: no clubbing, no cyanosis, no edema.  Skin: no rashes,  no petechiae, no venous stasis changes.  Neuro: alert and oriented X3, no focal motor deficits.  LN: no SC CHELSEA.      [LABS:]                        8.2    10.80 )-----------( 100      ( 02 Dec 2018 16:58 )             23.3     CBC Full  -  ( 02 Dec 2018 16:58 )  WBC Count : 10.80 K/uL  Hemoglobin : 8.2 g/dL  Hematocrit : 23.3 %  Platelet Count - Automated : 100 K/uL  Mean Cell Volume : 86.9 fl  Mean Cell Hemoglobin : 30.6 pg  Mean Cell Hemoglobin Concentration : 35.2 gm/dL  Auto Neutrophil # : x  Auto Lymphocyte # : x  Auto Monocyte # : x  Auto Eosinophil # : x  Auto Basophil # : x  Auto Neutrophil % : x  Auto Lymphocyte % : x  Auto Monocyte % : x  Auto Eosinophil % : x  Auto Basophil % : x    12-02    138  |  106  |  49<H>  ----------------------------<  265<H>  4.3   |  24  |  1.20    Ca    7.9<L>      02 Dec 2018 16:58  Phos  4.0     12-02  Mg     1.7     12-02    TPro  4.0<L>  /  Alb  1.8<L>  /  TBili  3.3<H>  /  DBili  1.20<H>  /  AST  29  /  ALT  20  /  AlkPhos  72  12-02    PT/INR - ( 02 Dec 2018 12:52 )   PT: 18.5 sec;   INR: 1.60 ratio         PTT - ( 02 Dec 2018 12:52 )  PTT:30.3 sec  LIVER FUNCTIONS - ( 02 Dec 2018 08:40 )  Alb: 1.8 g/dL / Pro: 4.0 g/dL / ALK PHOS: 72 U/L / ALT: 20 U/L / AST: 29 U/L / GGT: x           CARDIAC MARKERS ( 02 Dec 2018 08:40 )  x     / x     / 504 U/L / x     / x      CARDIAC MARKERS ( 30 Nov 2018 23:51 )  .019 ng/mL / x     / 301 U/L / x     / 2.8 ng/mL          WBC  TREND (5 Days)  WBC Count: 10.80 K/uL (12-02 @ 16:58)  WBC Count: 15.98 K/uL (12-02 @ 12:52)  WBC Count: 16.55 K/uL (12-02 @ 08:40)  WBC Count: 8.42 K/uL (12-01 @ 20:46)  WBC Count: 7.41 K/uL (12-01 @ 08:58)  WBC Count: 6.50 K/uL (12-01 @ 05:09)  WBC Count: 5.34 K/uL (12-01 @ 01:01)  WBC Count: 7.17 K/uL (11-30 @ 16:25)    HGB  TREND (5 Days)  Hemoglobin: 8.2 g/dL (12-02 @ 16:58)  Hemoglobin: 8.4 g/dL (12-02 @ 12:52)  Hemoglobin: 8.5 g/dL (12-02 @ 08:40)  Hemoglobin: 6.2 g/dL (12-01 @ 20:46)  Hemoglobin: 7.0 g/dL (12-01 @ 14:38)  Hemoglobin: 8.4 g/dL (12-01 @ 08:58)  Hemoglobin: 8.3 g/dL (12-01 @ 05:09)  Hemoglobin: 8.6 g/dL (12-01 @ 01:01)  Hemoglobin: 10.4 g/dL (11-30 @ 16:25)    HCT  TREND (5 Days)  Hematocrit: 23.3 % (12-02 @ 16:58)  Hematocrit: 23.9 % (12-02 @ 12:52)  Hematocrit: 24.3 % (12-02 @ 08:40)  Hematocrit: 18.1 % (12-01 @ 20:46)  Hematocrit: 20.7 % (12-01 @ 14:38)  Hematocrit: 24.3 % (12-01 @ 08:58)  Hematocrit: 24.0 % (12-01 @ 05:09)  Hematocrit: 25.2 % (12-01 @ 01:01)  Hematocrit: 30.8 % (11-30 @ 16:25)    PLT  TREND (5 Days)  Platelet Count - Automated: 100 K/uL (12-02 @ 16:58)  Platelet Count - Automated: 97 K/uL (12-02 @ 12:52)  Platelet Count - Automated: 97 K/uL (12-02 @ 08:40)  Platelet Count - Automated: 122 K/uL (12-01 @ 20:46)  Platelet Count - Automated: 89 K/uL (12-01 @ 08:58)  Platelet Count - Automated: 90 K/uL (12-01 @ 05:09)  Platelet Count - Automated: 84 K/uL (12-01 @ 01:01)  Platelet Count - Automated: 109 K/uL (11-30 @ 16:25)        Ferritin, Serum: 96 ng/mL (10-26 @ 14:51)  Iron with Total Binding Capacity (07.11.18 @ 18:48)    % Saturation, Iron: 56 %    Iron - Total Binding Capacity.: 457 ug/dL    Iron Total, Serum: 258 ug/dL    Unsaturated Iron Binding Capacity: 199 ug/dL    Vitamin B12, Serum in AM (02.06.18 @ 08:28)    Vitamin B12, Serum: 772: Note: Reference Range Change on 12/18/2017. pg/mL    Folate, Serum: 9.8 ng/mL (10-26 @ 14:51)       [RADIOLOGY & ADDITIONAL STUDIES:]

## 2018-12-02 NOTE — PROGRESS NOTE ADULT - PROBLEM SELECTOR PLAN 1
Bleeding Duodenal ulcer on Emergent EGD in ICU   -pt had multiple  bloody BM  2/2   rapid upper GI bleed   - Ac Blood loss Anemia with Bleeding DU   - hx of PUD, Esophagitis, Gastritis, non bleeding ulcers on last EGD10/18  - 3 u FFP, 7 u pRBC , 1 platelets & Vit K 5 mg x 1 dose given  - consult GI ([facility]), -on case, Sx Dr Hernandez on Case  - If further bleeding ---> plan to transfer to [facility] for IR / surgical intervention  - c/w Zofran q6hr, IV Protonix  Drip , Carafate q 6 hrs   - NPO with ice   - Serial CBC, -Coagulapathy with liver disease  -ICU care

## 2018-12-02 NOTE — PROGRESS NOTE ADULT - PROBLEM SELECTOR PLAN 2
- ? Etiology , Repeat level improving,  NOW leukocytosis,  pt is afebrile  -Pt had increased Lactate level from ER which had improved ;   Likely sec to re starting Metformin / hypotension   -  RVP- neg  - f/u BCx, UCx,-NEG S/P IV Fluids   -Serial lactate levels,

## 2018-12-02 NOTE — DIETITIAN INITIAL EVALUATION ADULT. - SIGNS/SYMPTOMS
as evidenced by UGIB- large bleeding duodenal ulcer s/p EGD w/ EPI injection s/p 7unit PRBC. evidenced by HgbA1c 8.3%, use of insulin, hx fattly liver disease, encephalopathy, TBili 3.7,NH3 123

## 2018-12-02 NOTE — DIETITIAN INITIAL EVALUATION ADULT. - PROBLEM SELECTOR PLAN 5
ammonia level 72 in ED; Pt not confused at baseline MS, no asterixis  on lactulose at home (not on rifaximin at home)  - c/w lactulose 30mL TID, titrate for soft BMs, rifaximin 550 BID  - f/u ammonia level in AM

## 2018-12-02 NOTE — PROGRESS NOTE ADULT - PROBLEM SELECTOR PLAN 7
2/2 Cirrhosis -ammonia level improved ? 2/2 GI Bleed ; Pt not confused at baseline MS, no asterixis  -- High Ammonia level - Improved,  Lactulose Stopped  on lactulose at home (not on rifaximin at home)   rifaximin 550 BID  - f/u ammonia level

## 2018-12-02 NOTE — DIETITIAN INITIAL EVALUATION ADULT. - PROBLEM SELECTOR PLAN 4
? compliant with insulin; Pt uses insulin samples due to high cost   in ED  -Hold home medications: Humalog 20 breakfast & lunch, Humalog 30 dinner-pre meals ,  toujeo 50 u SC at night    -Hypoglycemia Protocol, Low Dose Insulin Sliding Coverage, Lantus 30 units qhs, Accuchecks AC&HS.  -Diet: clears Consistent Carbs, advance as tolerated   -Follow up HbA1c.

## 2018-12-02 NOTE — PROGRESS NOTE ADULT - PROBLEM SELECTOR PLAN 6
? compliant with insulin; Pt uses insulin samples due to high cost  -A1C -improved, still elevated  -Hypoglycemia Protocol, Low Dose Insulin Sliding Coverage, Lantus 22 units qhs, Accu-Cheks AC&HS.  -Diet: NPO  -Follow up HbA1c.  -Hold home medications: Humalog 20 breakfast & lunch, Humalog 30 dinner-pre meals ,  toujeo 50 u SC at night

## 2018-12-02 NOTE — CONSULT NOTE ADULT - ASSESSMENT
*********************  [ASSESSMENT and  PLAN]  I have discussed the above plan of care with patient/family in detail. They expressed understanding of the treatment plan . Risks, benefits and alternatives discussed in detail. I have asked if they have any questions or concerns and appropriately addressed them; all questions answered to their satisfactions and in lay terms.     > xxxxxx minutes spent in direct patient care, examining and counseling patient,  reviewing  the notes, lab data/ imaging , discussion with multidisciplinary team. [ASSESSMENT and  PLAN]  Acute UGIB with ulcer.   prior H pylori negative on previous EGD.     Hgb and BP now stable s/p EGD, control of bleeding ulcer with epinephrine.     hx cirrhosis, due to JEAN/NAFLD.   Baseline Tbili ~ 2.7, INR ~ 1.2 [Feb 2018] to 1.5. [Oct 2018]  hx of coagulopathy due to cirrhosis.   hx of thrombocytopenia due to cirrhosis, Plts ~ 90,000 .     Coagulopathy and thrombocytopenia stable.   Did have  earlier today but repeat with PTT 30.3s same day, and PTT 33.4s the day before.  Likely lab error, doubt DIC.     RECOMMENDATION:   Transfuse PRBC, to maintain Hgb >7 to 8.   If significant decline in Hgb, transfuse and contact GI.   Follow closely.   Repeat iron/ anemia studies. if low, may benefit from IV iron to help replete stores. Pt was iron deficient prior to admission, on PO treatment.   Follow PTT, PT, INR and fibrinogen.   Follow CBC.   GI and surgical followup.       I have discussed the above plan of care with patient/family in detail. They expressed understanding of the treatment plan . Risks, benefits and alternatives discussed in detail. I have asked if they have any questions or concerns and appropriately addressed them; all questions answered to their satisfactions and in lay terms.
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
## 2018-12-02 NOTE — DIETITIAN INITIAL EVALUATION ADULT. - PROBLEM SELECTOR PLAN 7
in ED hypotensive  - hold BP meds for now  - on irbesartan 300mg QD at home, resume therapeutic ARB equivalent if BP becomes elevated

## 2018-12-02 NOTE — DIETITIAN INITIAL EVALUATION ADULT. - OTHER INFO
Pt sleeping soundly at time of visit. Dx Upper GI Bleed. S/p EGD which revealed large bleeding duodenal ulcer, injected w/ epinephrine. Bright red liquid stool 12/1. S/p 7 units PRBC, 3units FFP. Hx fatty liver disease, hepatic encephalopathy; lactulose pta. Elevated TBili, NH3 123. Hx uncontrolled DM, HgbA1c 8.3%. Pta on 50units toujeo q hs plus 20units humalog with breakfast, lunch and 30units humalog w/ dinner. Dietary compliance unknown, likely poor. Will remain available for diet, DM education when medically appropriate.

## 2018-12-02 NOTE — PROGRESS NOTE ADULT - SUBJECTIVE AND OBJECTIVE BOX
HISTORY  62y Male    24 HOUR EVENTS:    SUBJECTIVE/ROS:  [ ] A ten-point review of systems was otherwise negative except as noted.  [ ] Due to altered mental status/intubation, subjective information were not able to be obtained from the patient. History was obtained, to the extent possible, from review of the chart and collateral sources of information.      NEURO  RASS:     GCS:     CAM ICU:  Exam:   Meds:   [x] Adequacy of sedation and pain control has been assessed and adjusted      RESPIRATORY  RR: 12 (12-02-18 @ 08:00) (10 - 51)  SpO2: 100% (12-02-18 @ 08:00) (95% - 100%)  Wt(kg): --  Exam: unlabored, clear to auscultation bilaterally  Mechanical Ventilation:   ABG - ( 01 Dec 2018 20:47 )  pH: x     /  pCO2: x     /  pO2: x     / HCO3: x     / Base Excess: x     /  SaO2: x       Lactate: 4.6              [ ] Extubation Readiness Assessed  Meds:       CARDIOVASCULAR  HR: 110 (12-02-18 @ 08:00) (92 - 134)  BP: 126/60 (12-02-18 @ 08:00) (79/51 - 138/64)  BP(mean): 86 (12-02-18 @ 08:00) (58 - 92)  ABP: --  ABP(mean): --  Wt(kg): --  CVP(cm H2O): --    Lactate, Blood: 4.6 mmol/L (12-01 @ 20:47)    Exam:  Cardiac Rhythm:  Perfusion     [ ]Adequate   [ ]Inadequate  Mentation   [ ]Normal       [ ]Reduced  Extremities  [ ]Warm         [ ]Cool  Volume Status [ ]Hypervolemic [ ]Euvolemic [ ]Hypovolemic  Meds:       GI/NUTRITION  Exam:  Diet:  Meds: pantoprazole Infusion 8 mG/Hr IV Continuous <Continuous>  sucralfate 1 Gram(s) Oral every 6 hours  ursodiol Capsule 300 milliGRAM(s) Oral three times a day      GENITOURINARY  I&O's Detail    12-01 @ 07:01  -  12-02 @ 07:00  --------------------------------------------------------  IN:    Packed Red Blood Cells: 1196 mL    pantoprazole Infusion: 150 mL    Plasma: 518 mL    Solution: 50 mL  Total IN: 1914 mL    OUT:    Indwelling Catheter - Urethral: 730 mL    Voided: 300 mL  Total OUT: 1030 mL    Total NET: 884 mL          12-01    142  |  110<H>  |  35<H>  ----------------------------<  238<H>  4.6   |  24  |  1.10    Ca    8.3<L>      01 Dec 2018 08:58  Phos  2.5     12-01  Mg     1.6     12-01    TPro  5.0<L>  /  Alb  2.0<L>  /  TBili  3.7<H>  /  DBili  x   /  AST  32  /  ALT  20  /  AlkPhos  137<H>  12-01    [ ] Beasley catheter, indication: N/A  Meds: dextrose 5%. 1000 milliLiter(s) IV Continuous <Continuous>        HEMATOLOGIC  Meds:   [x] VTE Prophylaxis                        6.2    8.42  )-----------( 122      ( 01 Dec 2018 20:46 )             18.1     PT/INR - ( 01 Dec 2018 20:48 )   PT: 20.9 sec;   INR: 1.82 ratio         PTT - ( 01 Dec 2018 20:48 )  PTT:33.4 sec  Transfusion     [ ] PRBC   [ ] Platelets   [ ] FFP   [ ] Cryoprecipitate      INFECTIOUS DISEASES  T(C): 36.2 (12-02-18 @ 08:00), Max: 36.7 (12-01-18 @ 15:40)  Wt(kg): --  WBC Count: 8.42 K/uL (12-01 @ 20:46)  WBC Count: 7.41 K/uL (12-01 @ 08:58)    Recent Cultures:  Specimen Source: .Blood Blood, 11-30 @ 20:55; Results   No growth to date.; Gram Stain: --; Organism: --  Specimen Source: .Urine Clean Catch (Midstream), 11-30 @ 20:19; Results   <10,000 CFU/ml Normal Urogenital ninfa present; Gram Stain: --; Organism: --    Meds: rifaximin 550 milliGRAM(s) Oral two times a day        ENDOCRINE  Capillary Blood Glucose    Meds: dextrose 40% Gel 15 Gram(s) Oral once PRN  dextrose 50% Injectable 12.5 Gram(s) IV Push once  dextrose 50% Injectable 25 Gram(s) IV Push once  dextrose 50% Injectable 25 Gram(s) IV Push once  glucagon  Injectable 1 milliGRAM(s) IntraMuscular once PRN  insulin glargine Injectable (LANTUS) 15 Unit(s) SubCutaneous at bedtime  insulin lispro (HumaLOG) corrective regimen sliding scale   SubCutaneous every 6 hours        ACCESS DEVICES:  [ ] Peripheral IV  [ ] Central Venous Line	[ ] R	[ ] L	[ ] IJ	[ ] Fem	[ ] SC	Placed:   [ ] Arterial Line		[ ] R	[ ] L	[ ] Fem	[ ] Rad	[ ] Ax	Placed:   [ ] PICC:					[ ] Mediport  [ ] Urinary Catheter, Date Placed:   [ ] Necessity of urinary, arterial, and venous catheters discussed    OTHER MEDICATIONS:      CODE STATUS:     IMAGING: HISTORY    24 HOUR EVENTS:  Patient with continued bleeding overnight and into this AM. GI was consulted for emergent EGD and injected large gastric ulcer overnight. Unable to clip. Patient this AM with some normalization of BP although still mildly tachycardic and still with BRBPR.  His lactate this AM has increased from 4 to 8. He has received 7u PRBC at present time.     SUBJECTIVE/ROS:  [x ] A ten-point review of systems was otherwise negative except as noted.  [ ] Due to altered mental status/intubation, subjective information were not able to be obtained from the patient. History was obtained, to the extent possible, from review of the chart and collateral sources of information.      NEURO  RASS:   0  GCS:  15   CAM ICU: negative  Exam: TORREZ, A&O x3, no focal deficits  Meds:   [x] Adequacy of sedation and pain control has been assessed and adjusted    RESPIRATORY  RR: 12 (12-02-18 @ 08:00) (10 - 51)  SpO2: 100% (12-02-18 @ 08:00) (95% - 100%)  Wt(kg): --  Exam: unlabored, clear to auscultation bilaterally  Mechanical Ventilation: NA  ABG - ( 01 Dec 2018 20:47 )  pH: x     /  pCO2: x     /  pO2: x     / HCO3: x     / Base Excess: x     /  SaO2: x       Lactate: 4.6      [NA ] Extubation Readiness Assessed  Meds:     CARDIOVASCULAR  HR: 110 (12-02-18 @ 08:00) (92 - 134)  BP: 126/60 (12-02-18 @ 08:00) (79/51 - 138/64)  BP(mean): 86 (12-02-18 @ 08:00) (58 - 92)  ABP: --  ABP(mean): --  Wt(kg): --  CVP(cm H2O): --    Lactate, Blood: 4.6 mmol/L (12-01 @ 20:47)    Exam: S1S2, RR  Cardiac Rhythm: Sinus Tachycadia, no m/r/g  Perfusion     [ ]Adequate   [ x]Inadequate  Mentation   [ x]Normal       [ ]Reduced  Extremities  [ x]Warm         [ ]Cool  Volume Status [ ]Hypervolemic [ ]Euvolemic [x ]Hypovolemic  Meds:     GI/NUTRITION  Exam: soft nt nd, no guarding or rebound  Diet: NPO w ice chips only  Meds: pantoprazole Infusion 8 mG/Hr IV Continuous <Continuous>  sucralfate 1 Gram(s) Oral every 6 hours  ursodiol Capsule 300 milliGRAM(s) Oral three times a day    GENITOURINARY  I&O's Detail    12-01 @ 07:01  -  12-02 @ 07:00  --------------------------------------------------------  IN:    Packed Red Blood Cells: 1196 mL    pantoprazole Infusion: 150 mL    Plasma: 518 mL    Solution: 50 mL  Total IN: 1914 mL    OUT:    Indwelling Catheter - Urethral: 730 mL    Voided: 300 mL  Total OUT: 1030 mL    Total NET: 884 mL          12-01    142  |  110<H>  |  35<H>  ----------------------------<  238<H>  4.6   |  24  |  1.10    Ca    8.3<L>      01 Dec 2018 08:58  Phos  2.5     12-01  Mg     1.6     12-01    TPro  5.0<L>  /  Alb  2.0<L>  /  TBili  3.7<H>  /  DBili  x   /  AST  32  /  ALT  20  /  AlkPhos  137<H>  12-01    [ x] Beasley catheter, indication: Monitoring of the critically ill patient  Meds: dextrose 5%. 1000 milliLiter(s) IV Continuous <Continuous>    HEMATOLOGIC  Meds:   [x] VTE Prophylaxis                        6.2    8.42  )-----------( 122      ( 01 Dec 2018 20:46 )             18.1     PT/INR - ( 01 Dec 2018 20:48 )   PT: 20.9 sec;   INR: 1.82 ratio         PTT - ( 01 Dec 2018 20:48 )  PTT:33.4 sec  Transfusion     [ ] PRBC   [ ] Platelets   [ ] FFP   [ ] Cryoprecipitate    INFECTIOUS DISEASES  T(C): 36.2 (12-02-18 @ 08:00), Max: 36.7 (12-01-18 @ 15:40)  Wt(kg): --  WBC Count: 8.42 K/uL (12-01 @ 20:46)  WBC Count: 7.41 K/uL (12-01 @ 08:58)    Recent Cultures:  Specimen Source: .Blood Blood, 11-30 @ 20:55; Results   No growth to date.; Gram Stain: --; Organism: --  Specimen Source: .Urine Clean Catch (Midstream), 11-30 @ 20:19; Results   <10,000 CFU/ml Normal Urogenital ninfa present; Gram Stain: --; Organism: --    Meds: rifaximin 550 milliGRAM(s) Oral two times a day    ENDOCRINE  CAPILLARY BLOOD GLUCOSE    POCT Blood Glucose.: 323 mg/dL (02 Dec 2018 05:34)  POCT Blood Glucose.: 313 mg/dL (01 Dec 2018 23:16)  POCT Blood Glucose.: 264 mg/dL (01 Dec 2018 17:38)  POCT Blood Glucose.: 219 mg/dL (01 Dec 2018 11:47)    Meds: dextrose 40% Gel 15 Gram(s) Oral once PRN  dextrose 50% Injectable 12.5 Gram(s) IV Push once  dextrose 50% Injectable 25 Gram(s) IV Push once  dextrose 50% Injectable 25 Gram(s) IV Push once  glucagon  Injectable 1 milliGRAM(s) IntraMuscular once PRN  insulin glargine Injectable (LANTUS) 15 Unit(s) SubCutaneous at bedtime  insulin lispro (HumaLOG) corrective regimen sliding scale   SubCutaneous every 6 hours    ACCESS DEVICES:  [ x] Peripheral IV  [x ] Central Venous Line	[x ] R	[ ] L	[ x] IJ	[ ] Fem	[ ] SC	Placed:   [ ] Arterial Line		[ ] R	[ ] L	[ ] Fem	[ ] Rad	[ ] Ax	Placed:   [ ] PICC:					[ ] Mediport  [ x] Urinary Catheter, Date Placed:   [ x] Necessity of urinary, arterial, and venous catheters discussed    OTHER MEDICATIONS:    CODE STATUS:  Full    IMAGING: HISTORY    24 HOUR EVENTS:  Patient with continued bleeding overnight and into this AM. GI was consulted for emergent EGD and injected large gastric ulcer overnight. Unable to clip. Patient this AM with some normalization of BP although still mildly tachycardic and still with BRBPR.  His lactate this AM has increased from 4 to 8. He has received 7u PRBC at present time.     SUBJECTIVE/ROS:  [x ] A ten-point review of systems was otherwise negative except as noted.  [ ] Due to altered mental status/intubation, subjective information were not able to be obtained from the patient. History was obtained, to the extent possible, from review of the chart and collateral sources of information.    NEURO  RASS:   0  GCS:  15   CAM ICU: negative  Exam: TORREZ, A&O x3, no focal deficits  Meds:   [x] Adequacy of sedation and pain control has been assessed and adjusted    RESPIRATORY  RR: 12 (12-02-18 @ 08:00) (10 - 51)  SpO2: 100% (12-02-18 @ 08:00) (95% - 100%)  Wt(kg): --  Exam: unlabored, clear to auscultation bilaterally  Mechanical Ventilation: NA  ABG - ( 01 Dec 2018 20:47 )  pH: x     /  pCO2: x     /  pO2: x     / HCO3: x     / Base Excess: x     /  SaO2: x       Lactate: 4.6      [NA ] Extubation Readiness Assessed  Meds:     CARDIOVASCULAR  HR: 110 (12-02-18 @ 08:00) (92 - 134)  BP: 126/60 (12-02-18 @ 08:00) (79/51 - 138/64)  BP(mean): 86 (12-02-18 @ 08:00) (58 - 92)  ABP: --  ABP(mean): --  Wt(kg): --  CVP(cm H2O): --    Lactate, Blood: 4.6 mmol/L (12-01 @ 20:47)    Exam: S1S2, RR  Cardiac Rhythm: Sinus Tachycadia, no m/r/g  Perfusion     [ ]Adequate   [ x]Inadequate  Mentation   [ x]Normal       [ ]Reduced  Extremities  [ x]Warm         [ ]Cool  Volume Status [ ]Hypervolemic [ ]Euvolemic [x ]Hypovolemic  Meds:     GI/NUTRITION  Exam: soft nt nd, no guarding or rebound  Diet: NPO w ice chips only  Meds: pantoprazole Infusion 8 mG/Hr IV Continuous <Continuous>  sucralfate 1 Gram(s) Oral every 6 hours  ursodiol Capsule 300 milliGRAM(s) Oral three times a day    GENITOURINARY  I&O's Detail    12-01 @ 07:01  -  12-02 @ 07:00  --------------------------------------------------------  IN:    Packed Red Blood Cells: 1196 mL    pantoprazole Infusion: 150 mL    Plasma: 518 mL    Solution: 50 mL  Total IN: 1914 mL    OUT:    Indwelling Catheter - Urethral: 730 mL    Voided: 300 mL  Total OUT: 1030 mL    Total NET: 884 mL          12-01    142  |  110<H>  |  35<H>  ----------------------------<  238<H>  4.6   |  24  |  1.10    Ca    8.3<L>      01 Dec 2018 08:58  Phos  2.5     12-01  Mg     1.6     12-01    TPro  5.0<L>  /  Alb  2.0<L>  /  TBili  3.7<H>  /  DBili  x   /  AST  32  /  ALT  20  /  AlkPhos  137<H>  12-01    [ x] Beasley catheter, indication: Monitoring of the critically ill patient  Meds: dextrose 5%. 1000 milliLiter(s) IV Continuous <Continuous>    HEMATOLOGIC  Meds:   [x] VTE Prophylaxis                        6.2    8.42  )-----------( 122      ( 01 Dec 2018 20:46 )             18.1     PT/INR - ( 01 Dec 2018 20:48 )   PT: 20.9 sec;   INR: 1.82 ratio         PTT - ( 01 Dec 2018 20:48 )  PTT:33.4 sec  Transfusion     [ ] PRBC   [ ] Platelets   [ ] FFP   [ ] Cryoprecipitate    INFECTIOUS DISEASES  T(C): 36.2 (12-02-18 @ 08:00), Max: 36.7 (12-01-18 @ 15:40)  Wt(kg): --  WBC Count: 8.42 K/uL (12-01 @ 20:46)  WBC Count: 7.41 K/uL (12-01 @ 08:58)    Recent Cultures:  Specimen Source: .Blood Blood, 11-30 @ 20:55; Results   No growth to date.; Gram Stain: --; Organism: --  Specimen Source: .Urine Clean Catch (Midstream), 11-30 @ 20:19; Results   <10,000 CFU/ml Normal Urogenital ninfa present; Gram Stain: --; Organism: --    Meds: rifaximin 550 milliGRAM(s) Oral two times a day    ENDOCRINE  CAPILLARY BLOOD GLUCOSE    POCT Blood Glucose.: 323 mg/dL (02 Dec 2018 05:34)  POCT Blood Glucose.: 313 mg/dL (01 Dec 2018 23:16)  POCT Blood Glucose.: 264 mg/dL (01 Dec 2018 17:38)  POCT Blood Glucose.: 219 mg/dL (01 Dec 2018 11:47)    Meds: dextrose 40% Gel 15 Gram(s) Oral once PRN  dextrose 50% Injectable 12.5 Gram(s) IV Push once  dextrose 50% Injectable 25 Gram(s) IV Push once  dextrose 50% Injectable 25 Gram(s) IV Push once  glucagon  Injectable 1 milliGRAM(s) IntraMuscular once PRN  insulin glargine Injectable (LANTUS) 15 Unit(s) SubCutaneous at bedtime  insulin lispro (HumaLOG) corrective regimen sliding scale   SubCutaneous every 6 hours    ACCESS DEVICES:  [ x] Peripheral IV  [x ] Central Venous Line	[x ] R	[ ] L	[ x] IJ	[ ] Fem	[ ] SC	Placed:   [ ] Arterial Line		[ ] R	[ ] L	[ ] Fem	[ ] Rad	[ ] Ax	Placed:   [ ] PICC:					[ ] Mediport  [ x] Urinary Catheter, Date Placed:   [ x] Necessity of urinary, arterial, and venous catheters discussed    OTHER MEDICATIONS:    CODE STATUS:  Full    IMAGING:

## 2018-12-02 NOTE — PROGRESS NOTE ADULT - SUBJECTIVE AND OBJECTIVE BOX
Subjective: pt denies abdominal pain.    Objective:  Vital Signs Last 24 Hrs  T(C): 36.8 (02 Dec 2018 11:58), Max: 36.8 (02 Dec 2018 11:58)  T(F): 98.2 (02 Dec 2018 11:58), Max: 98.2 (02 Dec 2018 11:58)  HR: 111 (02 Dec 2018 12:28) (92 - 134)  BP: 115/53 (02 Dec 2018 12:00) (79/51 - 144/66)  BP(mean): 76 (02 Dec 2018 12:00) (58 - 95)  RR: 20 (02 Dec 2018 12:28) (10 - 51)  SpO2: 99% (02 Dec 2018 12:28) (95% - 100%)    Heent: MARCY MADRIGAL  Pul: decreases at bases  Cor: RSR, without murmurs  Abdomen: normal bowel sounds, without distension or tenderness  Extremities: without edema, motor/sensory intact, without calf pain, Homans sign negative.                        8.4    15.98 )-----------( 97       ( 02 Dec 2018 12:52 )             23.9       12-02    142  |  110<H>  |  49<H>  ----------------------------<  316<H>  4.8   |  19<L>  |  1.50<H>    Ca    8.1<L>      02 Dec 2018 08:40  Phos  4.0     12-02  Mg     1.7     12-02    TPro  4.0<L>  /  Alb  1.8<L>  /  TBili  3.3<H>  /  DBili  1.20<H>  /  AST  29  /  ALT  20  /  AlkPhos  72  12-02 12-01 @ 07:01  -  12-02 @ 07:00  --------------------------------------------------------  IN:    Packed Red Blood Cells: 1196 mL    pantoprazole Infusion: 150 mL    Plasma: 518 mL    Solution: 50 mL  Total IN: 1914 mL    OUT:    Indwelling Catheter - Urethral: 730 mL    Voided: 300 mL  Total OUT: 1030 mL    Total NET: 884 mL      12-02 @ 07:01  -  12-02 @ 13:01  --------------------------------------------------------  IN:    pantoprazole Infusion: 50 mL  Total IN: 50 mL    OUT:    Indwelling Catheter - Urethral: 220 mL  Total OUT: 220 mL    Total NET: -170 mL

## 2018-12-02 NOTE — PROGRESS NOTE ADULT - ATTENDING COMMENTS
62M PMH DM2, neuropathy, HTN, HLD, JEAN, hepatic encephalopathy, PUD, now admitted with duodenal ulcer with active bleeding, acute blood loss anemia and hypotension. Course complicated by MISA, lactic acidosis and coagulopathy - suspect DIC. He also has uncontrolled hyperglycemia.     Despite overnight events, he feels better and his HD and resp status is stable    Continue protonix drip  Keep NPO  Avoid AC, antiplatelets   Hb 8.5 now, will repeat and transfuse if decreasing  Check d-dimer, fibrinogen, FSPs  May need more FFP and platelets  LA has worsened but think will start to improve considering HD stability - will repeat in pm  abdominal exam is benign  Decrease Neurontin  Give extra lantus now and increase bedtime dose  Continue rifaximin, carafate    He may need to be transferred to [facility] for repeat EGD vs IR if rebleeds/becomes unstable/worsening labs    Patient is critically ill, 50mins spent 62M PMH DM2, neuropathy, HTN, HLD, JEAN, hepatic encephalopathy, PUD, now admitted with duodenal ulcer with active bleeding, acute blood loss anemia and hypotension. Course complicated by MISA, lactic acidosis and coagulopathy - suspect DIC. He also has uncontrolled hyperglycemia.     Despite overnight events, he feels better and his HD and resp status is stable    Continue protonix drip  Keep NPO  Avoid AC, antiplatelets   Hb 8.5 now, will repeat and transfuse if decreasing  Check d-dimer, fibrinogen, FSPs  May need more FFP and platelets  LA has worsened but think will start to improve considering HD stability - will repeat in pm  abdominal exam is benign  Decrease Neurontin  Give extra lantus now and increase bedtime dose  Continue rifaximin, carafate  UO is adequate at this time, monitor renal fn, avoid nephrotoxic agents    He may need to be transferred to [facility] for repeat EGD vs IR if rebleeds/becomes unstable/worsening labs    Patient is critically ill, 50mins spent

## 2018-12-03 LAB
ANION GAP SERPL CALC-SCNC: 9 MMOL/L — SIGNIFICANT CHANGE UP (ref 5–17)
APTT BLD: 30.6 SEC — SIGNIFICANT CHANGE UP (ref 27.5–36.3)
BASOPHILS # BLD AUTO: 0.02 K/UL — SIGNIFICANT CHANGE UP (ref 0–0.2)
BASOPHILS NFR BLD AUTO: 0.1 % — SIGNIFICANT CHANGE UP (ref 0–2)
BUN SERPL-MCNC: 48 MG/DL — HIGH (ref 7–23)
CALCIUM SERPL-MCNC: 7.9 MG/DL — LOW (ref 8.5–10.1)
CHLORIDE SERPL-SCNC: 105 MMOL/L — SIGNIFICANT CHANGE UP (ref 96–108)
CO2 SERPL-SCNC: 24 MMOL/L — SIGNIFICANT CHANGE UP (ref 22–31)
CREAT SERPL-MCNC: 1.1 MG/DL — SIGNIFICANT CHANGE UP (ref 0.5–1.3)
CRP SERPL-MCNC: 1.65 MG/DL — HIGH (ref 0–0.4)
EOSINOPHIL # BLD AUTO: 0 K/UL — SIGNIFICANT CHANGE UP (ref 0–0.5)
EOSINOPHIL NFR BLD AUTO: 0 % — SIGNIFICANT CHANGE UP (ref 0–6)
ERYTHROCYTE [SEDIMENTATION RATE] IN BLOOD: 6 MM/HR — SIGNIFICANT CHANGE UP (ref 0–20)
FERRITIN SERPL-MCNC: 127 NG/ML — SIGNIFICANT CHANGE UP (ref 30–400)
FIBRINOGEN PPP-MCNC: 207 MG/DL — LOW (ref 350–510)
FOLATE SERPL-MCNC: 8 NG/ML — SIGNIFICANT CHANGE UP
GLUCOSE SERPL-MCNC: 168 MG/DL — HIGH (ref 70–99)
HCT VFR BLD CALC: 19 % — CRITICAL LOW (ref 39–50)
HCT VFR BLD CALC: 22.4 % — LOW (ref 39–50)
HGB BLD-MCNC: 6.6 G/DL — CRITICAL LOW (ref 13–17)
HGB BLD-MCNC: 7.7 G/DL — LOW (ref 13–17)
IMM GRANULOCYTES NFR BLD AUTO: 0.5 % — SIGNIFICANT CHANGE UP (ref 0–1.5)
INR BLD: 1.61 RATIO — HIGH (ref 0.88–1.16)
IRON SATN MFR SERPL: 43 % — SIGNIFICANT CHANGE UP (ref 16–55)
IRON SATN MFR SERPL: 74 UG/DL — SIGNIFICANT CHANGE UP (ref 45–165)
LACTATE SERPL-SCNC: 2.5 MMOL/L — HIGH (ref 0.7–2)
LYMPHOCYTES # BLD AUTO: 1.92 K/UL — SIGNIFICANT CHANGE UP (ref 1–3.3)
LYMPHOCYTES # BLD AUTO: 12.7 % — LOW (ref 13–44)
MAGNESIUM SERPL-MCNC: 1.6 MG/DL — SIGNIFICANT CHANGE UP (ref 1.6–2.6)
MCHC RBC-ENTMCNC: 30.6 PG — SIGNIFICANT CHANGE UP (ref 27–34)
MCHC RBC-ENTMCNC: 34.4 GM/DL — SIGNIFICANT CHANGE UP (ref 32–36)
MCV RBC AUTO: 88.9 FL — SIGNIFICANT CHANGE UP (ref 80–100)
MONOCYTES # BLD AUTO: 0.87 K/UL — SIGNIFICANT CHANGE UP (ref 0–0.9)
MONOCYTES NFR BLD AUTO: 5.8 % — SIGNIFICANT CHANGE UP (ref 2–14)
NEUTROPHILS # BLD AUTO: 12.19 K/UL — HIGH (ref 1.8–7.4)
NEUTROPHILS NFR BLD AUTO: 80.9 % — HIGH (ref 43–77)
PHOSPHATE SERPL-MCNC: 3.2 MG/DL — SIGNIFICANT CHANGE UP (ref 2.5–4.5)
PLATELET # BLD AUTO: 70 K/UL — LOW (ref 150–400)
POTASSIUM SERPL-MCNC: 4.8 MMOL/L — SIGNIFICANT CHANGE UP (ref 3.5–5.3)
POTASSIUM SERPL-SCNC: 4.8 MMOL/L — SIGNIFICANT CHANGE UP (ref 3.5–5.3)
PROTHROM AB SERPL-ACNC: 18.5 SEC — HIGH (ref 10–12.9)
RBC # BLD: 2.52 M/UL — LOW (ref 4.2–5.8)
RBC # BLD: 2.52 M/UL — LOW (ref 4.2–5.8)
RBC # FLD: 18.4 % — HIGH (ref 10.3–14.5)
RETICS #: 107.1 K/UL — SIGNIFICANT CHANGE UP (ref 25–125)
RETICS/RBC NFR: 4.3 % — HIGH (ref 0.5–2.5)
SODIUM SERPL-SCNC: 138 MMOL/L — SIGNIFICANT CHANGE UP (ref 135–145)
TIBC SERPL-MCNC: 172 UG/DL — LOW (ref 220–430)
UIBC SERPL-MCNC: 98 UG/DL — LOW (ref 110–370)
VIT B12 SERPL-MCNC: 915 PG/ML — SIGNIFICANT CHANGE UP (ref 232–1245)
WBC # BLD: 15.08 K/UL — HIGH (ref 3.8–10.5)
WBC # FLD AUTO: 15.08 K/UL — HIGH (ref 3.8–10.5)

## 2018-12-03 PROCEDURE — 99291 CRITICAL CARE FIRST HOUR: CPT

## 2018-12-03 RX ORDER — ATORVASTATIN CALCIUM 80 MG/1
10 TABLET, FILM COATED ORAL AT BEDTIME
Qty: 0 | Refills: 0 | Status: DISCONTINUED | OUTPATIENT
Start: 2018-12-03 | End: 2018-12-11

## 2018-12-03 RX ORDER — LIDOCAINE 4 G/100G
1 CREAM TOPICAL
Qty: 0 | Refills: 0 | Status: DISCONTINUED | OUTPATIENT
Start: 2018-12-03 | End: 2018-12-11

## 2018-12-03 RX ORDER — PHYTONADIONE (VIT K1) 5 MG
5 TABLET ORAL ONCE
Qty: 0 | Refills: 0 | Status: COMPLETED | OUTPATIENT
Start: 2018-12-03 | End: 2018-12-03

## 2018-12-03 RX ORDER — FENTANYL CITRATE 50 UG/ML
25 INJECTION INTRAVENOUS ONCE
Qty: 0 | Refills: 0 | Status: DISCONTINUED | OUTPATIENT
Start: 2018-12-03 | End: 2018-12-03

## 2018-12-03 RX ORDER — MAGNESIUM SULFATE 500 MG/ML
2 VIAL (ML) INJECTION ONCE
Qty: 0 | Refills: 0 | Status: COMPLETED | OUTPATIENT
Start: 2018-12-03 | End: 2018-12-03

## 2018-12-03 RX ORDER — INSULIN GLARGINE 100 [IU]/ML
40 INJECTION, SOLUTION SUBCUTANEOUS AT BEDTIME
Qty: 0 | Refills: 0 | Status: DISCONTINUED | OUTPATIENT
Start: 2018-12-03 | End: 2018-12-04

## 2018-12-03 RX ADMIN — URSODIOL 300 MILLIGRAM(S): 250 TABLET, FILM COATED ORAL at 22:01

## 2018-12-03 RX ADMIN — Medication 101 MILLIGRAM(S): at 19:48

## 2018-12-03 RX ADMIN — PANTOPRAZOLE SODIUM 10 MG/HR: 20 TABLET, DELAYED RELEASE ORAL at 05:35

## 2018-12-03 RX ADMIN — Medication 3: at 23:21

## 2018-12-03 RX ADMIN — GABAPENTIN 200 MILLIGRAM(S): 400 CAPSULE ORAL at 05:35

## 2018-12-03 RX ADMIN — INSULIN GLARGINE 40 UNIT(S): 100 INJECTION, SOLUTION SUBCUTANEOUS at 22:00

## 2018-12-03 RX ADMIN — PANTOPRAZOLE SODIUM 10 MG/HR: 20 TABLET, DELAYED RELEASE ORAL at 14:10

## 2018-12-03 RX ADMIN — GABAPENTIN 200 MILLIGRAM(S): 400 CAPSULE ORAL at 23:14

## 2018-12-03 RX ADMIN — Medication 1 GRAM(S): at 23:13

## 2018-12-03 RX ADMIN — URSODIOL 300 MILLIGRAM(S): 250 TABLET, FILM COATED ORAL at 05:35

## 2018-12-03 RX ADMIN — Medication 1 GRAM(S): at 12:50

## 2018-12-03 RX ADMIN — PANTOPRAZOLE SODIUM 10 MG/HR: 20 TABLET, DELAYED RELEASE ORAL at 22:01

## 2018-12-03 RX ADMIN — Medication 3: at 17:38

## 2018-12-03 RX ADMIN — SODIUM CHLORIDE 75 MILLILITER(S): 9 INJECTION, SOLUTION INTRAVENOUS at 22:02

## 2018-12-03 RX ADMIN — FENTANYL CITRATE 25 MICROGRAM(S): 50 INJECTION INTRAVENOUS at 19:15

## 2018-12-03 RX ADMIN — SODIUM CHLORIDE 75 MILLILITER(S): 9 INJECTION, SOLUTION INTRAVENOUS at 03:06

## 2018-12-03 RX ADMIN — Medication 1 GRAM(S): at 05:35

## 2018-12-03 RX ADMIN — Medication 2: at 12:50

## 2018-12-03 RX ADMIN — FENTANYL CITRATE 25 MICROGRAM(S): 50 INJECTION INTRAVENOUS at 18:50

## 2018-12-03 RX ADMIN — FENTANYL CITRATE 25 MICROGRAM(S): 50 INJECTION INTRAVENOUS at 19:06

## 2018-12-03 RX ADMIN — Medication 1: at 05:40

## 2018-12-03 RX ADMIN — Medication 50 GRAM(S): at 09:59

## 2018-12-03 RX ADMIN — URSODIOL 300 MILLIGRAM(S): 250 TABLET, FILM COATED ORAL at 14:10

## 2018-12-03 RX ADMIN — FENTANYL CITRATE 25 MICROGRAM(S): 50 INJECTION INTRAVENOUS at 19:00

## 2018-12-03 NOTE — PROGRESS NOTE ADULT - PROBLEM SELECTOR PLAN 7
2/2 Cirrhosis -ammonia level improved ? 2/2 GI Bleed ; Pt not confused at baseline MS, no asterixis  -- High Ammonia level - Improved,  Lactulose Stopped  on lactulose at home (not on rifaximin at home)   rifaximin 550 BID  - f/u ammonia level 2/2 Cirrhosis -ammonia level improved ? 2/2 GI Bleed ; Pt not confused at baseline MS, no asterixis  -- High Ammonia level - Improved,  Lactulose Stopped  on lactulose at home (not on rifaximin at home)   rifaximin 550 BID  -ammonia level 51

## 2018-12-03 NOTE — PROGRESS NOTE ADULT - ASSESSMENT
[ASSESSMENT and  PLAN]  61 yo male with history of JEAN/NAFLD with cirrhosis, with baseline bilirubin 2.7, admitted after near syncope and found to anemia with GI bleed due to large duodenal ulcer; s/p EGD and injection of ulcer with epinephrine  Also history hx of coagulopathy due to cirrhosis and basline thromobyctopenia with cirrhosis (platelet count usually 90K).     RECOMMENDATION:   currently s/p 11 units pRBC  continue transfuse PRBC, to maintain Hgb >7 to 8. would also suggest transfusion of platelets and FFP if progressive drop in Hg  f/u iron studies/B12/folate  monitor CBC/Coags  continued on carafate as per GI  will follow with you

## 2018-12-03 NOTE — PROGRESS NOTE ADULT - SUBJECTIVE AND OBJECTIVE BOX
Neurology follow up note    JESSICA [id number]      Interval History:    Patient feels ok     MEDICATIONS    atorvastatin 10 milliGRAM(s) Oral at bedtime  dextrose 40% Gel 15 Gram(s) Oral once PRN  dextrose 5%. 1000 milliLiter(s) IV Continuous <Continuous>  dextrose 50% Injectable 12.5 Gram(s) IV Push once  dextrose 50% Injectable 25 Gram(s) IV Push once  dextrose 50% Injectable 25 Gram(s) IV Push once  gabapentin 200 milliGRAM(s) Oral four times a day  glucagon  Injectable 1 milliGRAM(s) IntraMuscular once PRN  insulin glargine Injectable (LANTUS) 40 Unit(s) SubCutaneous at bedtime  insulin lispro (HumaLOG) corrective regimen sliding scale   SubCutaneous every 6 hours  lactated ringers. 1000 milliLiter(s) IV Continuous <Continuous>  pantoprazole Infusion 8 mG/Hr IV Continuous <Continuous>  rifaximin 550 milliGRAM(s) Oral two times a day  sucralfate 1 Gram(s) Oral every 6 hours  ursodiol Capsule 300 milliGRAM(s) Oral three times a day      Allergies    codeine (Anaphylaxis)    Intolerances            Vital Signs Last 24 Hrs  T(C): 36.5 (03 Dec 2018 12:00), Max: 37 (03 Dec 2018 08:00)  T(F): 97.7 (03 Dec 2018 12:00), Max: 98.6 (03 Dec 2018 08:00)  HR: 116 (03 Dec 2018 14:00) (108 - 119)  BP: 133/60 (03 Dec 2018 14:00) (110/62 - 152/54)  BP(mean): 84 (03 Dec 2018 14:00) (76 - 113)  RR: 29 (03 Dec 2018 14:00) (9 - 135)  SpO2: 96% (03 Dec 2018 14:00) (94% - 100%)    REVIEW OF SYSTEMS:  Constitutional: No fever, chills, fatigue, generalized weakness  Eyes: no eye pain, visual disturbances, or discharge  ENT:  No difficulty hearing, tinnitus, vertigo; No sinus or throat pain  Neck: No pain or stiffness  Respiratory: No cough, dyspnea, wheezing   Cardiovascular: No chest pain, palpitations,   Gastrointestinal: No abdominal or epigastric pain. No nausea, vomiting  No diarrhea or constipation.   Genitourinary: No dysuria, frequency, hematuria or incontinence  Neurological: No headaches, postive lightheadedness, no vertigo, numbness or tremors  Psychiatric: No depression, anxiety, mood swings or difficulty sleeping  Musculoskeletal: No joint pain or swelling; No muscle, back or extremity pain  Skin: No itching, burning, rashes or lesions   Lymph Nodes: No enlarged glands  Endocrine: No heat or cold intolerance; No hair loss   Allergy and Immunologic: No hives or eczema    On Neurological Examination:    The patient is awake, alert, oriented x3.    Extraocular movements were intact.    Pupils equal, round and reactive bilaterally, 3 mm to 2.    Speech was fluent.  Smile symmetric.    Motor:  Bilateral upper and lower 5/5.    Sensory:  Bilateral upper and lower intact to light touch.  No drift.  Finger-to-nose within normal limits.    Follow simple commands      GENERAL Exam: Nontoxic , No Acute Distress   	  HEENT:  normocephalic, atraumatic  		  LUNGS: Clear bilaterally    	  HEART: Normal S1S2   No murmur RRR        	  GI/ ABDOMEN:  Soft  Non tender    EXTREMITIES:   No Edema  No Clubbing  No Cyanosis     MUSCULOSKELETAL: decreased Range of Motion all 4 extremities   	   SKIN: Normal  No Ecchymosis               LABS:  CBC Full  -  ( 03 Dec 2018 06:24 )  WBC Count : 15.08 K/uL  Hemoglobin : 7.7 g/dL  Hematocrit : 22.4 %  Platelet Count - Automated : 70 K/uL  Mean Cell Volume : 88.9 fl  Mean Cell Hemoglobin : 30.6 pg  Mean Cell Hemoglobin Concentration : 34.4 gm/dL  Auto Neutrophil # : 12.19 K/uL  Auto Lymphocyte # : 1.92 K/uL  Auto Monocyte # : 0.87 K/uL  Auto Eosinophil # : 0.00 K/uL  Auto Basophil # : 0.02 K/uL  Auto Neutrophil % : 80.9 %  Auto Lymphocyte % : 12.7 %  Auto Monocyte % : 5.8 %  Auto Eosinophil % : 0.0 %  Auto Basophil % : 0.1 %      12-03    138  |  105  |  48<H>  ----------------------------<  168<H>  4.8   |  24  |  1.10    Ca    7.9<L>      03 Dec 2018 06:24  Phos  3.2     12-03  Mg     1.6     12-03    TPro  4.2<L>  /  Alb  2.1<L>  /  TBili  3.1<H>  /  DBili  1.10<H>  /  AST  38<H>  /  ALT  22  /  AlkPhos  71  12-03    Hemoglobin A1C:     LIVER FUNCTIONS - ( 03 Dec 2018 06:24 )  Alb: 2.1 g/dL / Pro: 4.2 g/dL / ALK PHOS: 71 U/L / ALT: 22 U/L / AST: 38 U/L / GGT: x           Vitamin B12 Vitamin B12, Serum: 915 pg/mL (12-03 @ 08:01)    PT/INR - ( 03 Dec 2018 06:24 )   PT: 18.5 sec;   INR: 1.61 ratio         PTT - ( 03 Dec 2018 06:24 )  PTT:30.6 sec      RADIOLOGY    ANALYSIS AND PLAN:  A 62-year-old with episode of loss of consciousness.  1.	For episode of loss of consciousness, suspect most likely a syncopal event with a prodrome of feeling lightheaded, blurry vision, warm and sweaty, nausea and vomiting, as per spouse looked pale and becoming in the emergency room hypotensive points towards cerebral hypoperfusion.  There is no clear sign on examination to suggest a new cerebrovascular accident has ensued and there is no clear history to suggest underlying epilepsy.  2.	I would recommend telemetry evaluation, rule out underlying arrhythmia.  3.	Monitor systolic blood pressure.  4.	For history of diabetes, would recommend strict control of the patient's blood sugars.  5.	For history of neuropathy, continue the patient on his gabapentin.  6.	For episode of spasms, I would check calcium and phosphorus levels.  Questionable this could be secondary to slightly low magnesium.  7.	Spoke with spouse, Ada,  Her telephone number is [phone]  8.	monitor H/H   9.	GI work up as needed S/P GI bleed  10.	no new events       Greater than 45 minutes spent in direct patient care reviewing  the notes, lab data/ imaging , discussion with multidisciplinary team.

## 2018-12-03 NOTE — PROGRESS NOTE ADULT - SUBJECTIVE AND OBJECTIVE BOX
Patient is a 62y old  Male who presents with a chief complaint of vomiting and near syncope, upper GI bleeding (02 Dec 2018 13:00)      INTERVAL HPI:  Pt is a 63 y/o M with PMHx of Type 2 DM, HTN, HLD, obesity, nephrolithiasis (last stone 25 years ago), neuropathy, HE, NAFLD, Hyper ammonemia, recent Gastritis/ esophagitis on EGD & Gram variable teddy bacteremia in 10/2018  who presents with vomiting and  near syncope/falling out of a moving car. Pt [place] and [place]", his vision was cloudy, he pulled over the car and stopped and vomited. Vomitus was "creamy white." Pt denies seeing blood in vomitus. His dtr then took over driving and Pt again felt like he needed to vomit and told dtr to pull over. He thought the car had stopped and he opened the door and fell out of the moving car. He laid on the ground for ~ 15 minutes. Pt says he possibly lost consciousness. States when he was lying on the ground felt very cold. Pt has had "terrible heartburn" for 2 days and felt chills for 2 days. Pt denies abdominal pain, CP, palp, SOB, cough, myalgias, rash, HA. Pt admits to chills, vomiting (4 episodes), diarrhea (from lactulose), dizziness, pedal edema, tingling in hands (chronic, intermittent). Pt denies recent travel, sick contacts, different eating patterns.    Pt states that early this week pt saw her Endocrinologist & started back on Metformin 500 mg daily as per Dr's recommendation.  In ED Pt's vitals were: T(C): 36.3, HR: 91, BP: 85/59, RR: 15, SpO2: 98% on RA  Labs significant for lactate 5.6, , Hgb 10.4, total bili 3.7, alk phos 183, ammonia 72, Mg 1.4. UA - trace LE, mod blood, RBC 6-10, urine glucose 1,000.   CXR - negative chest  Abd Xray - There is mild content in the colon and no overt sense of bowel obstruction.  CT head - no intracranial hemorrhage or mass effect  CT cervical - Multilevel degenerative disc disease/cervical spondylosis, with posterior osteophyte disc complex C6-7 resulting in flattening of the cervical cord. If there is a clinical concern for spinal cord injury, recommend further evaluation with MRI if there are no clinical contraindications.  EKG - NSR, 93bpm    Pt given 2L NS bolus, Zofran pantoprazole, famotidine, Carafate , Pt seen by GI Dr Morales in ER & was admitted for further Eval.    18: Pt seen, examined, Had total 4 bloody BM so far , Lactate improved, pt had CT A/P with IV Contrast. Drop in H/H. plan for, start Protonix drip,  2 u pRBC 1 u FFP & 5 mg Vit K x 1 as d/w Dr Franks -GI, Elevated ammonia level. Coagulopathy, Improving Lactate. Ac Blood loss Anemia.    18: Pt seen, Examined, pt was transferred to ICU on 18 for symptomatic upper GI Bleed, S/P 7  units pRBC in Total , 3 FFP, 1 platelets transfusion given, Pt had emergent EGD done that found Ac Bleeding Duodenal ulcer found. S/P Epi given. Homeostasis obtained, Pt seen By Sx Dr Hernandez. H/H Stable,  today with MISA, Coagulopathy , Leukocytosis & High Lactate level.  H/H Low stable with Ac Blood  Loss Anemia. Hematology Dr Rios called, as pt is followed by Dr Rios- H/O as out pt.      OVERNIGHT EVENTS: GI Bleed with S/P EGD with bleeding  Duodenal Ulcer  & multiple pRBC transfusions,     Home Medications:  Bacid (LAC) oral tablet: 1 tab(s) orally 2 times a day for  2weeks (2018 19:41)  HumaLOG 100 units/mL subcutaneous solution: 20 unit(s) subcutaneous 2 times a day(breakfast and lunch) (2018 19:41)  HumaLOG 100 units/mL subcutaneous solution: 30 unit(s) subcutaneous once a day(@dinner) (2018 19:41)  irbesartan 300 mg oral tablet: 1 tab(s) orally once a day (2018 19:41)  lactulose 10 g/15 mL oral syrup: 15 milliliter(s) orally 3 times a day (2018 19:41)  Lasix 20 mg oral tablet: 1 tab(s) orally once a day (2018 19:41)  pantoprazole 40 mg oral delayed release tablet: 1 tab(s) orally once a day (2018 19:41)  phytonadione 100 mcg oral tablet: 1 tab(s) orally once a day (2018 19:41)  potassium chloride 10 mEq oral tablet, extended release: 1 tab(s) orally once a day (2018 19:41)  pravastatin 40 mg oral tablet: 1 tab(s) orally once a day (2018 19:41)  Vitamin D3 2000 intl units oral tablet: 1 tab(s) orally once a day (2018 19:41)      MEDICATIONS  (STANDING):  dextrose 5%. 1000 milliLiter(s) (50 mL/Hr) IV Continuous <Continuous>  dextrose 50% Injectable 12.5 Gram(s) IV Push once  dextrose 50% Injectable 25 Gram(s) IV Push once  dextrose 50% Injectable 25 Gram(s) IV Push once  gabapentin 200 milliGRAM(s) Oral four times a day  insulin glargine Injectable (LANTUS) 22 Unit(s) SubCutaneous at bedtime  insulin lispro (HumaLOG) corrective regimen sliding scale   SubCutaneous every 6 hours  pantoprazole Infusion 8 mG/Hr (10 mL/Hr) IV Continuous <Continuous>  rifaximin 550 milliGRAM(s) Oral two times a day  sucralfate 1 Gram(s) Oral every 6 hours  ursodiol Capsule 300 milliGRAM(s) Oral three times a day    MEDICATIONS  (PRN):  dextrose 40% Gel 15 Gram(s) Oral once PRN Blood Glucose LESS THAN 70 milliGRAM(s)/deciliter  glucagon  Injectable 1 milliGRAM(s) IntraMuscular once PRN Glucose LESS THAN 70 milligrams/deciliter      Allergies    codeine (Anaphylaxis)    Intolerances        REVIEW OF SYSTEMS: feel very tired  CONSTITUTIONAL: No fever, No chills, No fatigue, No myalgia, No Body ache, No Weakness  EYES: No eye pain,  No visual disturbances, No discharge, NO Redness  ENMT:  No ear pain, No nose bleed, No vertigo; No sinus or throat pain, No Congestion  NECK: No pain, No stiffness  RESPIRATORY: No cough, wheezing, No  hemoptysis, No shortness of breath  CARDIOVASCULAR: No chest pain, palpitations  GASTROINTESTINAL: No abdominal or epigastric pain. No nausea, No vomiting; No diarrhea or constipation. [  ] BM  GENITOURINARY: No dysuria, No frequency, No urgency, No hematuria, or incontinence  NEUROLOGICAL: No headaches, No dizziness, No numbness, No tingling, No tremors, No weakness  EXT: No Swelling, No Pain, No Edema  SKIN:  [ x ] No itching, burning, rashes, or lesions   MUSCULOSKELETAL: No joint pain or swelling; No muscle pain, No back pain, No extremity pain  PSYCHIATRIC: No depression, anxiety, mood swings or difficulty sleeping at night  PAIN SCALE: [x  ] None  [  ] Other-  ROS Unable to obtain due to - [  ] Dementia  [  ] Lethargy  [  ] Sedated [  ] non verbal  REST OF REVIEW Of SYSTEM - [ x ] Normal     Vital Signs Last 24 Hrs  T(C): 36.8 (02 Dec 2018 11:58), Max: 36.8 (02 Dec 2018 11:58)  T(F): 98.2 (02 Dec 2018 11:58), Max: 98.2 (02 Dec 2018 11:58)  HR: 111 (02 Dec 2018 12:28) (92 - 134)  BP: 115/53 (02 Dec 2018 12:00) (79/51 - 144/66)  BP(mean): 76 (02 Dec 2018 12:00) (58 - 95)  RR: 20 (02 Dec 2018 12:28) (10 - 51)  SpO2: 99% (02 Dec 2018 12:28) (95% - 100%)  Finger Stick         @ :  -   @ 07:00  --------------------------------------------------------  IN: 1914 mL / OUT: 1030 mL / NET: 884 mL     @ 07:  -   @ 13:15  --------------------------------------------------------  IN: 50 mL / OUT: 220 mL / NET: -170 mL        PHYSICAL EXAM: Rt IJ TLC  GENERAL:  [x  ] NAD , [ x ] well appearing, [  ] Agitated, [  ] Drowsy,  [  ] Lethargy, [  ] confused   HEAD:  [ x ] Normal, [  ] Other  EYES:  [ x ] EOMI, [x  ] PERRLA, [ x ] conjunctiva and sclera clear normal, [  ] Other,  [x  ] Pallor,[  ] Discharge  ENMT:  [x  ] Normal, [ x ] Moist mucous membranes, [ x ] Good dentition, [ x ] No Thrush  NECK:  [x  ] Supple, [ x ] No JVD, [x  ] Normal thyroid, [  ] Lymphadenopathy [  ] Other  CHEST/LUNG:  [x  ] Clear to auscultation bilaterally, [ x ] Breath Sounds equal B/L,  [x  ] No rales, [x  ] No rhonchi  [ x ]  No wheezing  HEART:  [x  ] Regular rate and rhythm, [ x ] tachycardia, [  ] Bradycardia,  [  ] irregular  [ x] No murmurs, No rubs, No gallops, [  ] PPM in place (Mfr:  )  ABDOMEN:  [x  ] Soft, [ x ] Nontender, [x  ] Nondistended, [x  ] No mass, [x  ] Bowel sounds present, [ x ] obese  NERVOUS SYSTEM:  [x  ] Alert & Oriented X3, [ x ] Nonfocal  [  ] Confusion  [  ] Encephalopathic [  ] Sedated [  ] Unable to assess, [  ] Other-  EXTREMITIES: [ x ] 2+ Peripheral Pulses, No clubbing, No cyanosis,  [  ] edema B/L lower EXT. [  ] PVD stasis skin changes B/L Lower EXT  LYMPH: No lymphadenopathy noted  SKIN:  [ x ] No rashes or lesions, [  ] Pressure Ulcers, [  ] ecchymosis, [  ] Skin Tears, [  ] Other    DIET: NPO    LABS:                        8.4    15.98 )-----------( 97       ( 02 Dec 2018 12:52 )             23.9     02 Dec 2018 12:52    141    |  109    |  50     ----------------------------<  309    4.6     |  22     |  1.30     Ca    7.7        02 Dec 2018 12:52  Phos  4.0       02 Dec 2018 08:40  Mg     1.7       02 Dec 2018 08:40    TPro  4.0    /  Alb  1.8    /  TBili  3.3    /  DBili  1.20   /  AST  29     /  ALT  20     /  AlkPhos  72     02 Dec 2018 08:40    PT/INR - ( 02 Dec 2018 12:52 )   PT: 18.5 sec;   INR: 1.60 ratio         PTT - ( 02 Dec 2018 12:52 )  PTT:30.3 sec  Lactate, Blood (12..18 @ 08:40)    Lactate, Blood: 8.2 mmol/L    Lactate, Blood (..18 @ 12:52)    Lactate, Blood: 4.2 mmol/L    Hemoglobin A1C, Whole Blood (.18 @ 10:32)    Hemoglobin A1C, Whole Blood: 8.3: Method: Immunoassay       Reference Range                4.0-5.6%       High risk (prediabetic)        5.7-6.4%       Diabetic, diagnostic             >=6.5%       ADA diabetic treatment goal       <7.0%  The Hemoglobin A1c testing is NGSP-certified.Reference ranges are based  upon the 2010 recommendations of  the American Diabetes Association.  Interpretation may vary for children  and adolescents. %        Urinalysis Basic - ( 2018 18:30 )    Color: Yellow / Appearance: Slightly Turbid / S.015 / pH: x  Gluc: x / Ketone: Trace  / Bili: Small / Urobili: Negative   Blood: x / Protein: 25 mg/dL / Nitrite: Negative   Leuk Esterase: Trace / RBC: 6-10 /HPF / WBC 3-5   Sq Epi: x / Non Sq Epi: Occasional / Bacteria: Occasional        Culture Results:   No growth to date. ( @ 20:55)  Culture Results:   No growth to date. ( @ 20:55)  Culture Results:   <10,000 CFU/ml Normal Urogenital ninfa present ( @ 20:19)      culture blood  -- .Blood Blood  @ 20:55    culture urine  --   @ 20:55  culture blood  -- .Urine Clean Catch (Midstream)  @ 20:19    culture urine  --   @ 20:19      CARDIAC MARKERS ( 02 Dec 2018 08:40 )  x     / x     / 504 U/L / x     / x      CARDIAC MARKERS ( 2018 23:51 )  .019 ng/mL / x     / 301 U/L / x     / 2.8 ng/mL  CARDIAC MARKERS ( 2018 16:25 )  .019 ng/mL / x     / x     / x     / x          Culture - Blood (collected 2018 20:55)  Source: .Blood Blood  Preliminary Report (01 Dec 2018 21:01):    No growth to date.    Culture - Blood (collected 2018 20:55)  Source: .Blood Blood  Preliminary Report (01 Dec 2018 21:01):    No growth to date.    Culture - Urine (collected 2018 20:19)  Source: .Urine Clean Catch (Midstream)  Final Report (01 Dec 2018 15:16):    <10,000 CFU/ml Normal Urogenital ninfa present         Lipase, Serum: 70 U/L (18 @ 16:25)      RADIOLOGY & ADDITIONAL TESTS:  < from: Xray Chest 1 View- PORTABLE-Urgent (18 @ 23:39) >    EXAM:  XR CHEST PORTABLE URGENT 1V                            PROCEDURE DATE:  2018          INTERPRETATION:  Portable chest radiograph       CLINICAL INFORMATION: Line placement. GI bleeding.    TECHNIQUE:  Single portable frontal AP view ofthe chest was obtained.    FINDINGS: The examination of 10/17/2018 is available for review.    The lungs are free of infiltrate.     No pleural abnormality is seen.      The heart and mediastinum appear unchanged. The tip of the right IJ line   is projecting over the superior vena cava    No destructive lesion is seen in the visualized skeleton.      IMPRESSION:   No infiltrate.          < end of copied text >      HEALTH ISSUES - PROBLEM Dx:  GIB (gastrointestinal bleeding): GIB (gastrointestinal bleeding)  Lactic acid blood increased: Lactic acid blood increased  Prophylactic measure: Prophylactic measure  Pedal edema: Pedal edema  Neuropathy: Neuropathy  Hypercholesteremia: Hypercholesteremia  Hypertension: Hypertension  Fatty liver disease, nonalcoholic: Fatty liver disease, nonalcoholic  Hepatic encephalopathy: Hepatic encephalopathy  Diabetes: Diabetes  Near syncope: Near syncope  Vomiting: Vomiting      Consultant(s) Notes Reviewed:  [ x ] YES     Care Discussed with [X] Consultants  [  x] Patient  [  ] Family  [  ]   [  ] Social Service  [ x ] RN, [  ] Physical Therapy  DVT PPX: [  ] Lovenox, [  ] S C Heparin, [  ] Coumadin, [  ] Xarelto, [  ] Eliquis, [  ] Pradaxa, [  ] IV Heparin drip, [ x ] SCD [  ] Contraindication 2 to GI Bleed,[  ] Ambulation  Advanced directive: [x  ] None, [  ] DNR/DNI Patient is a 62y old  Male who presents with a chief complaint of vomiting and near syncope, upper GI bleeding (02 Dec 2018 13:00)      INTERVAL HPI:  Pt is a 61 y/o M with PMHx of Type 2 DM, HTN, HLD, obesity, nephrolithiasis (last stone 25 years ago), neuropathy, HE, NAFLD, Hyper ammonemia, recent Gastritis/ esophagitis on EGD & Gram variable teddy bacteremia in 10/2018  who presents with vomiting and  near syncope/falling out of a moving car. Pt [place] and [place]", his vision was cloudy, he pulled over the car and stopped and vomited. Vomitus was "creamy white." Pt denies seeing blood in vomitus. His dtr then took over driving and Pt again felt like he needed to vomit and told dtr to pull over. He thought the car had stopped and he opened the door and fell out of the moving car. He laid on the ground for ~ 15 minutes. Pt says he possibly lost consciousness. States when he was lying on the ground felt very cold. Pt has had "terrible heartburn" for 2 days and felt chills for 2 days. Pt denies abdominal pain, CP, palp, SOB, cough, myalgias, rash, HA. Pt admits to chills, vomiting (4 episodes), diarrhea (from lactulose), dizziness, pedal edema, tingling in hands (chronic, intermittent). Pt denies recent travel, sick contacts, different eating patterns.    Pt states that early this week pt saw her Endocrinologist & started back on Metformin 500 mg daily as per Dr's recommendation.  In ED Pt's vitals were: T(C): 36.3, HR: 91, BP: 85/59, RR: 15, SpO2: 98% on RA  Labs significant for lactate 5.6, , Hgb 10.4, total bili 3.7, alk phos 183, ammonia 72, Mg 1.4. UA - trace LE, mod blood, RBC 6-10, urine glucose 1,000.   CXR - negative chest  Abd Xray - There is mild content in the colon and no overt sense of bowel obstruction.  CT head - no intracranial hemorrhage or mass effect  CT cervical - Multilevel degenerative disc disease/cervical spondylosis, with posterior osteophyte disc complex C6-7 resulting in flattening of the cervical cord. If there is a clinical concern for spinal cord injury, recommend further evaluation with MRI if there are no clinical contraindications.  EKG - NSR, 93bpm    Pt given 2L NS bolus, Zofran pantoprazole, famotidine, Carafate , Pt seen by GI Dr Morales in ER & was admitted for further Eval.    18: Pt seen, examined, Had total 4 bloody BM so far , Lactate improved, pt had CT A/P with IV Contrast. Drop in H/H. plan for, start Protonix drip,  2 u pRBC 1 u FFP & 5 mg Vit K x 1 as d/w Dr Franks -GI, Elevated ammonia level. Coagulopathy, Improving Lactate. Ac Blood loss Anemia.    18: Pt seen, Examined, pt was transferred to ICU on 18 for symptomatic upper GI Bleed, S/P 7  units pRBC in Total , 3 FFP, 1 platelets transfusion given, Pt had emergent EGD done that found Ac Bleeding Duodenal ulcer found. S/P Epi given. Homeostasis obtained, Pt seen By Sx Dr Hernandez. H/H Stable,  today with MISA, Coagulopathy , Leukocytosis & High Lactate level.  H/H Low stable with Ac Blood  Loss Anemia. Hematology Dr Rios called, as pt is followed by Dr Rios- H/O as out pt.    12/3/18: Patient seen and examined this morning at bedside. Patient denies any acute complaints this morning. Hgb drop from 8.2 --> 7.7. Leukocytosis present at 15.08. Lactate level drop from 3.2 --> 2.5    OVERNIGHT EVENTS: None    Home Medications:  Bacid (LAC) oral tablet: 1 tab(s) orally 2 times a day for  2weeks (2018 19:41)  HumaLOG 100 units/mL subcutaneous solution: 20 unit(s) subcutaneous 2 times a day(breakfast and lunch) (2018 19:41)  HumaLOG 100 units/mL subcutaneous solution: 30 unit(s) subcutaneous once a day(@dinner) (2018 19:41)  irbesartan 300 mg oral tablet: 1 tab(s) orally once a day (2018 19:41)  lactulose 10 g/15 mL oral syrup: 15 milliliter(s) orally 3 times a day (2018 19:41)  Lasix 20 mg oral tablet: 1 tab(s) orally once a day (2018 19:41)  pantoprazole 40 mg oral delayed release tablet: 1 tab(s) orally once a day (2018 19:41)  phytonadione 100 mcg oral tablet: 1 tab(s) orally once a day (2018 19:41)  potassium chloride 10 mEq oral tablet, extended release: 1 tab(s) orally once a day (2018 19:41)  pravastatin 40 mg oral tablet: 1 tab(s) orally once a day (2018 19:41)  Vitamin D3 2000 intl units oral tablet: 1 tab(s) orally once a day (2018 19:41)      MEDICATIONS  (STANDING):  dextrose 5%. 1000 milliLiter(s) (50 mL/Hr) IV Continuous <Continuous>  dextrose 50% Injectable 12.5 Gram(s) IV Push once  dextrose 50% Injectable 25 Gram(s) IV Push once  dextrose 50% Injectable 25 Gram(s) IV Push once  gabapentin 200 milliGRAM(s) Oral four times a day  insulin glargine Injectable (LANTUS) 22 Unit(s) SubCutaneous at bedtime  insulin lispro (HumaLOG) corrective regimen sliding scale   SubCutaneous every 6 hours  pantoprazole Infusion 8 mG/Hr (10 mL/Hr) IV Continuous <Continuous>  rifaximin 550 milliGRAM(s) Oral two times a day  sucralfate 1 Gram(s) Oral every 6 hours  ursodiol Capsule 300 milliGRAM(s) Oral three times a day    MEDICATIONS  (PRN):  dextrose 40% Gel 15 Gram(s) Oral once PRN Blood Glucose LESS THAN 70 milliGRAM(s)/deciliter  glucagon  Injectable 1 milliGRAM(s) IntraMuscular once PRN Glucose LESS THAN 70 milligrams/deciliter      Allergies    codeine (Anaphylaxis)    Intolerances        REVIEW OF SYSTEMS:   CONSTITUTIONAL: No fever, No chills, No fatigue, No myalgia, No Body ache, No Weakness  EYES: No eye pain,  No visual disturbances, No discharge, NO Redness  ENMT:  No ear pain, No nose bleed, No vertigo; No sinus or throat pain, No Congestion  NECK: No pain, No stiffness  RESPIRATORY: No cough, wheezing, No  hemoptysis, No shortness of breath  CARDIOVASCULAR: No chest pain, palpitations  GASTROINTESTINAL: No abdominal or epigastric pain. No nausea, No vomiting; No diarrhea or constipation. [  ] BM  GENITOURINARY: No dysuria, No frequency, No urgency, No hematuria, or incontinence  NEUROLOGICAL: No headaches, No dizziness, No numbness, No tingling, No tremors, No weakness  EXT: No Swelling, No Pain, No Edema  SKIN:  [ x ] No itching, burning, rashes, or lesions   MUSCULOSKELETAL: No joint pain. Swelling in the R hand (possible due to IV placement). No muscle pain, No back pain, No extremity pain  PSYCHIATRIC: No depression, anxiety, mood swings or difficulty sleeping at night  PAIN SCALE: [x  ] None  [  ] Other-  ROS Unable to obtain due to - [  ] Dementia  [  ] Lethargy  [  ] Sedated [  ] non verbal  REST OF REVIEW Of SYSTEM - [ x ] Normal     Vital Signs Last 24 Hrs  T(C): 36.8 (02 Dec 2018 11:58), Max: 36.8 (02 Dec 2018 11:58)  T(F): 98.2 (02 Dec 2018 11:58), Max: 98.2 (02 Dec 2018 11:58)  HR: 111 (02 Dec 2018 12:28) (92 - 134)  BP: 115/53 (02 Dec 2018 12:00) (79/51 - 144/66)  BP(mean): 76 (02 Dec 2018 12:00) (58 - 95)  RR: 20 (02 Dec 2018 12:28) (10 - 51)  SpO2: 99% (02 Dec 2018 12:28) (95% - 100%)  Finger Stick         @   -   @ 07:00  --------------------------------------------------------  IN: 1914 mL / OUT: 1030 mL / NET: 884 mL     @ :  -   @ 13:15  --------------------------------------------------------  IN: 50 mL / OUT: 220 mL / NET: -170 mL        PHYSICAL EXAM: Rt IJ TLC  GENERAL:  [x  ] NAD , [ x ] well appearing, [  ] Agitated, [  ] Drowsy,  [  ] Lethargy, [  ] confused   HEAD:  [ x ] Normal, [  ] Other  EYES:  [ x ] EOMI, [x  ] PERRLA, [ x ] conjunctiva and sclera clear normal, [  ] Other,  [x  ] Pallor,[  ] Discharge  ENMT:  [x  ] Normal, [ x ] Moist mucous membranes, [ x ] Good dentition, [ x ] No Thrush  NECK:  [x  ] Supple, [ x ] No JVD, [x  ] Normal thyroid, [  ] Lymphadenopathy [  ] Other  CHEST/LUNG:  [x  ] Clear to auscultation bilaterally, [ x ] Breath Sounds equal B/L,  [x  ] No rales, [x  ] No rhonchi  [ x ]  No wheezing  HEART:  [x  ] Regular rate and rhythm, [ x ] tachycardia, [  ] Bradycardia,  [  ] irregular  [ x] No murmurs, No rubs, No gallops, [  ] PPM in place (Mfr:  )  ABDOMEN:  [x  ] Soft, [ x ] Nontender, [x  ] Nondistended, [x  ] No mass, [x  ] Bowel sounds present, [ x ] obese  NERVOUS SYSTEM:  [x  ] Alert & Oriented X2, [ x ] Nonfocal  [  ] Confusion  [  ] Encephalopathic [  ] Sedated [  ] Unable to assess, [  ] Other-  EXTREMITIES: [ x ] 2+ Peripheral Pulses, No clubbing, No cyanosis,  [  ] edema B/L lower EXT. [  ] PVD stasis skin changes B/L Lower EXT  LYMPH: No lymphadenopathy noted  SKIN:  [ x ] No rashes or lesions, [  ] Pressure Ulcers, [  ] ecchymosis, [  ] Skin Tears, [  ] Other    DIET: NPO    LABS:                        8.4    15.98 )-----------( 97       ( 02 Dec 2018 12:52 )             23.9     02 Dec 2018 12:52    141    |  109    |  50     ----------------------------<  309    4.6     |  22     |  1.30     Ca    7.7        02 Dec 2018 12:52  Phos  4.0       02 Dec 2018 08:40  Mg     1.7       02 Dec 2018 08:40    TPro  4.0    /  Alb  1.8    /  TBili  3.3    /  DBili  1.20   /  AST  29     /  ALT  20     /  AlkPhos  72     02 Dec 2018 08:40    PT/INR - ( 02 Dec 2018 12:52 )   PT: 18.5 sec;   INR: 1.60 ratio         PTT - ( 02 Dec 2018 12:52 )  PTT:30.3 sec  Lactate, Blood (..18 @ 08:40)    Lactate, Blood: 8.2 mmol/L    Lactate, Blood (.18 @ 12:52)    Lactate, Blood: 4.2 mmol/L    Hemoglobin A1C, Whole Blood (.18 @ 10:32)    Hemoglobin A1C, Whole Blood: 8.3: Method: Immunoassay       Reference Range                4.0-5.6%       High risk (prediabetic)        5.7-6.4%       Diabetic, diagnostic             >=6.5%       ADA diabetic treatment goal       <7.0%  The Hemoglobin A1c testing is NGSP-certified.Reference ranges are based  upon the 2010 recommendations of  the American Diabetes Association.  Interpretation may vary for children  and adolescents. %        Urinalysis Basic - ( 2018 18:30 )    Color: Yellow / Appearance: Slightly Turbid / S.015 / pH: x  Gluc: x / Ketone: Trace  / Bili: Small / Urobili: Negative   Blood: x / Protein: 25 mg/dL / Nitrite: Negative   Leuk Esterase: Trace / RBC: 6-10 /HPF / WBC 3-5   Sq Epi: x / Non Sq Epi: Occasional / Bacteria: Occasional        Culture Results:   No growth to date. ( @ 20:55)  Culture Results:   No growth to date. ( @ 20:55)  Culture Results:   <10,000 CFU/ml Normal Urogenital ninfa present ( @ 20:19)      culture blood  -- .Blood Blood  @ 20:55    culture urine  --   @ 20:55  culture blood  -- .Urine Clean Catch (Midstream)  @ 20:19    culture urine  --   @ 20:19      CARDIAC MARKERS ( 02 Dec 2018 08:40 )  x     / x     / 504 U/L / x     / x      CARDIAC MARKERS ( 2018 23:51 )  .019 ng/mL / x     / 301 U/L / x     / 2.8 ng/mL  CARDIAC MARKERS ( 2018 16:25 )  .019 ng/mL / x     / x     / x     / x          Culture - Blood (collected 2018 20:55)  Source: .Blood Blood  Preliminary Report (01 Dec 2018 21:01):    No growth to date.    Culture - Blood (collected 2018 20:55)  Source: .Blood Blood  Preliminary Report (01 Dec 2018 21:01):    No growth to date.    Culture - Urine (collected 2018 20:19)  Source: .Urine Clean Catch (Midstream)  Final Report (01 Dec 2018 15:16):    <10,000 CFU/ml Normal Urogenital ninfa present         Lipase, Serum: 70 U/L (18 @ 16:25)      RADIOLOGY & ADDITIONAL TESTS:  < from: Xray Chest 1 View- PORTABLE-Urgent (18 @ 23:39) >    EXAM:  XR CHEST PORTABLE URGENT 1V                            PROCEDURE DATE:  2018          INTERPRETATION:  Portable chest radiograph       CLINICAL INFORMATION: Line placement. GI bleeding.    TECHNIQUE:  Single portable frontal AP view ofthe chest was obtained.    FINDINGS: The examination of 10/17/2018 is available for review.    The lungs are free of infiltrate.     No pleural abnormality is seen.      The heart and mediastinum appear unchanged. The tip of the right IJ line   is projecting over the superior vena cava    No destructive lesion is seen in the visualized skeleton.      IMPRESSION:   No infiltrate.          < end of copied text >      HEALTH ISSUES - PROBLEM Dx:  GIB (gastrointestinal bleeding): GIB (gastrointestinal bleeding)  Lactic acid blood increased: Lactic acid blood increased  Prophylactic measure: Prophylactic measure  Pedal edema: Pedal edema  Neuropathy: Neuropathy  Hypercholesteremia: Hypercholesteremia  Hypertension: Hypertension  Fatty liver disease, nonalcoholic: Fatty liver disease, nonalcoholic  Hepatic encephalopathy: Hepatic encephalopathy  Diabetes: Diabetes  Near syncope: Near syncope  Vomiting: Vomiting      Consultant(s) Notes Reviewed:  [ x ] YES     Care Discussed with [X] Consultants  [  x] Patient  [  ] Family  [  ]   [  ] Social Service  [ x ] RN, [  ] Physical Therapy  DVT PPX: [  ] Lovenox, [  ] S C Heparin, [  ] Coumadin, [  ] Xarelto, [  ] Eliquis, [  ] Pradaxa, [  ] IV Heparin drip, [ x ] SCD [  ] Contraindication 2 to GI Bleed,[  ] Ambulation  Advanced directive: [x  ] None, [  ] DNR/DNI Patient is a 62y old  Male who presents with a chief complaint of vomiting and near syncope, upper GI bleeding (02 Dec 2018 13:00)      INTERVAL HPI:  Pt is a 63 y/o M with PMHx of Type 2 DM, HTN, HLD, obesity, nephrolithiasis (last stone 25 years ago), neuropathy, HE, NAFLD, Hyper ammonemia, recent Gastritis/ esophagitis on EGD & Gram variable teddy bacteremia in 10/2018  who presents with vomiting and  near syncope/falling out of a moving car. Pt [place] and [place]", his vision was cloudy, he pulled over the car and stopped and vomited. Vomitus was "creamy white." Pt denies seeing blood in vomitus. His dtr then took over driving and Pt again felt like he needed to vomit and told dtr to pull over. He thought the car had stopped and he opened the door and fell out of the moving car. He laid on the ground for ~ 15 minutes. Pt says he possibly lost consciousness. States when he was lying on the ground felt very cold. Pt has had "terrible heartburn" for 2 days and felt chills for 2 days. Pt denies abdominal pain, CP, palp, SOB, cough, myalgias, rash, HA. Pt admits to chills, vomiting (4 episodes), diarrhea (from lactulose), dizziness, pedal edema, tingling in hands (chronic, intermittent). Pt denies recent travel, sick contacts, different eating patterns.    Pt states that early this week pt saw her Endocrinologist & started back on Metformin 500 mg daily as per Dr's recommendation.  In ED Pt's vitals were: T(C): 36.3, HR: 91, BP: 85/59, RR: 15, SpO2: 98% on RA  Labs significant for lactate 5.6, , Hgb 10.4, total bili 3.7, alk phos 183, ammonia 72, Mg 1.4. UA - trace LE, mod blood, RBC 6-10, urine glucose 1,000.   CXR - negative chest  Abd Xray - There is mild content in the colon and no overt sense of bowel obstruction.  CT head - no intracranial hemorrhage or mass effect  CT cervical - Multilevel degenerative disc disease/cervical spondylosis, with posterior osteophyte disc complex C6-7 resulting in flattening of the cervical cord. If there is a clinical concern for spinal cord injury, recommend further evaluation with MRI if there are no clinical contraindications.  EKG - NSR, 93bpm    Pt given 2L NS bolus, Zofran pantoprazole, famotidine, Carafate , Pt seen by GI Dr Morales in ER & was admitted for further Eval.    18: Pt seen, examined, Had total 4 bloody BM so far , Lactate improved, pt had CT A/P with IV Contrast. Drop in H/H. plan for, start Protonix drip,  2 u pRBC 1 u FFP & 5 mg Vit K x 1 as d/w Dr Franks -GI, Elevated ammonia level. Coagulopathy, Improving Lactate. Ac Blood loss Anemia.    18: Pt seen, Examined, pt was transferred to ICU on 18 for symptomatic upper GI Bleed, S/P 7  units pRBC in Total , 3 FFP, 1 platelets transfusion given, Pt had emergent EGD done that found Ac Bleeding Duodenal ulcer found. S/P Epi given. Homeostasis obtained, Pt seen By Sx Dr Hernandez. H/H Stable,  today with MISA, Coagulopathy , Leukocytosis & High Lactate level.  H/H Low stable with Ac Blood  Loss Anemia. Hematology Dr Rios called, as pt is followed by Dr Rios- H/O as out pt.    12/3/18: Patient seen and examined this morning at bedside. Patient denies any acute complaints this morning. Hgb drop from 8.2 --> 7.7. Leukocytosis present at 15.08. Lactate level drop from 3.2 --> 2.5    OVERNIGHT EVENTS: None    Home Medications:  Bacid (LAC) oral tablet: 1 tab(s) orally 2 times a day for  2weeks (2018 19:41)  HumaLOG 100 units/mL subcutaneous solution: 20 unit(s) subcutaneous 2 times a day(breakfast and lunch) (2018 19:41)  HumaLOG 100 units/mL subcutaneous solution: 30 unit(s) subcutaneous once a day(@dinner) (2018 19:41)  irbesartan 300 mg oral tablet: 1 tab(s) orally once a day (2018 19:41)  lactulose 10 g/15 mL oral syrup: 15 milliliter(s) orally 3 times a day (2018 19:41)  Lasix 20 mg oral tablet: 1 tab(s) orally once a day (2018 19:41)  pantoprazole 40 mg oral delayed release tablet: 1 tab(s) orally once a day (2018 19:41)  phytonadione 100 mcg oral tablet: 1 tab(s) orally once a day (2018 19:41)  potassium chloride 10 mEq oral tablet, extended release: 1 tab(s) orally once a day (2018 19:41)  pravastatin 40 mg oral tablet: 1 tab(s) orally once a day (2018 19:41)  Vitamin D3 2000 intl units oral tablet: 1 tab(s) orally once a day (2018 19:41)      MEDICATIONS  (STANDING):  dextrose 5%. 1000 milliLiter(s) (50 mL/Hr) IV Continuous <Continuous>  dextrose 50% Injectable 12.5 Gram(s) IV Push once  dextrose 50% Injectable 25 Gram(s) IV Push once  dextrose 50% Injectable 25 Gram(s) IV Push once  gabapentin 200 milliGRAM(s) Oral four times a day  insulin glargine Injectable (LANTUS) 22 Unit(s) SubCutaneous at bedtime  insulin lispro (HumaLOG) corrective regimen sliding scale   SubCutaneous every 6 hours  pantoprazole Infusion 8 mG/Hr (10 mL/Hr) IV Continuous <Continuous>  rifaximin 550 milliGRAM(s) Oral two times a day  sucralfate 1 Gram(s) Oral every 6 hours  ursodiol Capsule 300 milliGRAM(s) Oral three times a day    MEDICATIONS  (PRN):  dextrose 40% Gel 15 Gram(s) Oral once PRN Blood Glucose LESS THAN 70 milliGRAM(s)/deciliter  glucagon  Injectable 1 milliGRAM(s) IntraMuscular once PRN Glucose LESS THAN 70 milligrams/deciliter      Allergies    codeine (Anaphylaxis)    Intolerances    REVIEW OF SYSTEMS:   CONSTITUTIONAL: No fever, No chills, No fatigue, No myalgia, No Body ache, No Weakness  EYES: No eye pain,  No visual disturbances, No discharge, NO Redness  ENMT:  No ear pain, No nose bleed, No vertigo; No sinus or throat pain, No Congestion  NECK: No pain, No stiffness  RESPIRATORY: No cough, wheezing, No hemoptysis, No shortness of breath  CARDIOVASCULAR: No chest pain, palpitations  GASTROINTESTINAL: No abdominal or epigastric pain. No nausea, No vomiting; No diarrhea or constipation. [ x ] BM  GENITOURINARY: No dysuria, No frequency, No urgency, No hematuria, or incontinence  NEUROLOGICAL: No headaches, No dizziness, No numbness, No tingling, No tremors, No weakness  EXT: No Swelling, No Pain, No Edema  SKIN:  [ x ] No itching, burning, new rashes, or lesions   MUSCULOSKELETAL: No joint pain. slight swelling in the R hand (possible due to IV placement on dorsal aspect of hand), non-painful. No muscle pain, No back pain, No extremity pain  PSYCHIATRIC: No depression, anxiety, mood swings or difficulty sleeping at night  PAIN SCALE: [x  ] None  [  ] Other-  ROS Unable to obtain due to - [  ] Dementia  [  ] Lethargy  [  ] Sedated [  ] non verbal  REST OF REVIEW Of SYSTEM - [ x ] Normal     Vital Signs Last 24 Hrs  T(C): 37 (03 Dec 2018 08:00), Max: 37 (03 Dec 2018 08:00)  T(F): 98.6 (03 Dec 2018 08:00), Max: 98.6 (03 Dec 2018 08:00)  HR: 109 (03 Dec 2018 10:00) (109 - 119)  BP: 124/59 (03 Dec 2018 10:00) (110/62 - 152/54)  BP(mean): 84 (03 Dec 2018 10:00) (76 - 113)  RR: 19 (03 Dec 2018 10:00) (9 - 135)  SpO2: 94% (03 Dec 2018 10:00) (94% - 100%)     @ : @ 07:00  --------------------------------------------------------  IN: 1914 mL / OUT: 1030 mL / NET: 884 mL     @ 07:  -   @ 13:15  --------------------------------------------------------  IN: 50 mL / OUT: 220 mL / NET: -170 mL    PHYSICAL EXAM: Rt IJ TLC  GENERAL:  [x  ] NAD , [ x ] well appearing, [  ] Agitated, [  ] Drowsy,  [  ] Lethargy, [  ] confused   HEAD:  [ x ] Normal, [  ] Other  EYES:  [ x ] EOMI, [x  ] PERRLA, [ x ] conjunctiva and sclera clear normal, [  ] Other,  [x  ] Pallor,[  ] Discharge  ENMT:  [x  ] Normal, [ x ] Moist mucous membranes, [ x ] Good dentition, [ x ] No Thrush  NECK:  [x  ] Supple, [ x ] No JVD, [x  ] Normal thyroid, [  ] Lymphadenopathy [  ] Other  CHEST/LUNG:  [x  ] Clear to auscultation bilaterally, [ x ] Breath Sounds equal B/L,  [x  ] No rales, [x  ] No rhonchi  [ x ]  No wheezing  HEART:  [x  ] irregular rate and regular rhythm, [ x ] tachycardia, [  ] Bradycardia,  [  ] irregular  [ x] No murmurs, No rubs, No gallops, [  ] PPM in place (Mfr:  )  ABDOMEN:  [x  ] Soft, [ x ] Nontender, [x  ] Nondistended, [x  ] No mass, [x  ] Bowel sounds present, [ x ] obese  NERVOUS SYSTEM:  [x  ] Alert & Oriented X2, [ x ] Nonfocal  [  ] Confusion  [  ] Encephalopathic [  ] Sedated [  ] Unable to assess, [  ] Other-  EXTREMITIES: [ x ] 2+ Peripheral Pulses, No clubbing, No cyanosis,  [  ] edema B/L lower EXT. [  ] PVD stasis skin changes B/L Lower EXT  LYMPH: No lymphadenopathy noted  SKIN:  [ x ] No new rashes or lesions, [  ] Pressure Ulcers, [  ] ecchymosis, [  ] Skin Tears, [  ] Other    DIET: NPO    LABS:                        7.7    15.08 )-----------( 70       ( 03 Dec 2018 06:24 )             22.4     12-03    138  |  105  |  48<H>  ----------------------------<  168<H>  4.8   |  24  |  1.10    Ca    7.9<L>      03 Dec 2018 06:24  Phos  3.2       Mg     1.6         TPro  4.2<L>  /  Alb  2.1<L>  /  TBili  3.1<H>  /  DBili  1.10<H>  /  AST  38<H>  /  ALT  22  /  AlkPhos  71      LIVER FUNCTIONS - ( 03 Dec 2018 06:24 )  Alb: 2.1 g/dL / Pro: 4.2 g/dL / ALK PHOS: 71 U/L / ALT: 22 U/L / AST: 38 U/L / GGT: x           PT/INR - ( 03 Dec 2018 06:24 )   PT: 18.5 sec;   INR: 1.61 ratio      PTT - ( 03 Dec 2018 06:24 )  PTT:30.6 sec    Lactate, Blood (12.02.18 @ 12:52)    Lactate, Blood: 4.2 mmol/L    Hemoglobin A1C, Whole Blood (12.01.18 @ 10:32)  Hemoglobin A1C, Whole Blood: 8.3: Method: Immunoassay    Urinalysis Basic - ( 2018 18:30 )    Color: Yellow / Appearance: Slightly Turbid / S.015 / pH: x  Gluc: x / Ketone: Trace  / Bili: Small / Urobili: Negative   Blood: x / Protein: 25 mg/dL / Nitrite: Negative   Leuk Esterase: Trace / RBC: 6-10 /HPF / WBC 3-5   Sq Epi: x / Non Sq Epi: Occasional / Bacteria: Occasional      Culture Results:   No growth to date. ( @ 20:55)  Culture Results:   No growth to date. ( @ 20:55)  Culture Results:   <10,000 CFU/ml Normal Urogenital ninfa present ( @ 20:19)      culture blood  -- .Blood Blood  @ 20:55    culture urine  --   @ 20:55  culture blood  -- .Urine Clean Catch (Midstream)  @ 20:19    culture urine  --   @ 20:19      CARDIAC MARKERS ( 02 Dec 2018 08:40 )  x     / x     / 504 U/L / x     / x      CARDIAC MARKERS ( 2018 23:51 )  .019 ng/mL / x     / 301 U/L / x     / 2.8 ng/mL  CARDIAC MARKERS ( 2018 16:25 )  .019 ng/mL / x     / x     / x     / x          Culture - Blood (collected 2018 20:55)  Source: .Blood Blood  Preliminary Report (01 Dec 2018 21:01):    No growth to date.    Culture - Blood (collected 2018 20:55)  Source: .Blood Blood  Preliminary Report (01 Dec 2018 21:01):    No growth to date.    Culture - Urine (collected 2018 20:19)  Source: .Urine Clean Catch (Midstream)  Final Report (01 Dec 2018 15:16):    <10,000 CFU/ml Normal Urogenital ninfa present    Lipase, Serum: 70 U/L (18 @ 16:25)    RADIOLOGY & ADDITIONAL TESTS:  < from: Xray Chest 1 View- PORTABLE-Urgent (18 @ 23:39) >    EXAM:  XR CHEST PORTABLE URGENT 1V                            PROCEDURE DATE:  2018        INTERPRETATION:  Portable chest radiograph       CLINICAL INFORMATION: Line placement. GI bleeding.    TECHNIQUE:  Single portable frontal AP view ofthe chest was obtained.    FINDINGS: The examination of 10/17/2018 is available for review.    The lungs are free of infiltrate.     No pleural abnormality is seen.      The heart and mediastinum appear unchanged. The tip of the right IJ line   is projecting over the superior vena cava    No destructive lesion is seen in the visualized skeleton.    IMPRESSION:   No infiltrate.      < end of copied text >    HEALTH ISSUES - PROBLEM Dx:  GIB (gastrointestinal bleeding): GIB (gastrointestinal bleeding)  Lactic acid blood increased: Lactic acid blood increased  Prophylactic measure: Prophylactic measure  Pedal edema: Pedal edema  Neuropathy: Neuropathy  Hypercholesteremia: Hypercholesteremia  Hypertension: Hypertension  Fatty liver disease, nonalcoholic: Fatty liver disease, nonalcoholic  Hepatic encephalopathy: Hepatic encephalopathy  Diabetes: Diabetes  Near syncope: Near syncope  Vomiting: Vomiting      Consultant(s) Notes Reviewed:  [ x ] YES     Care Discussed with [X] Consultants  [  x] Patient  [  ] Family  [  ]   [  ] Social Service  [ x ] RN, [  ] Physical Therapy  DVT PPX: [  ] Lovenox, [  ] S C Heparin, [  ] Coumadin, [  ] Xarelto, [  ] Eliquis, [  ] Pradaxa, [  ] IV Heparin drip, [ x ] SCD [  ] Contraindication 2 to GI Bleed,[  ] Ambulation  Advanced directive: [x  ] None, [  ] DNR/DNI Patient is a 62y old  Male who presents with a chief complaint of vomiting and near syncope, upper GI bleeding (02 Dec 2018 13:00)      INTERVAL HPI:  Pt is a 63 y/o M with PMHx of Type 2 DM, HTN, HLD, obesity, nephrolithiasis (last stone 25 years ago), neuropathy, HE, NAFLD, Hyper ammonemia, recent Gastritis/ esophagitis on EGD & Gram variable teddy bacteremia in 10/2018  who presents with vomiting and  near syncope/falling out of a moving car. Pt [place] and [place]", his vision was cloudy, he pulled over the car and stopped and vomited. Vomitus was "creamy white." Pt denies seeing blood in vomitus. His dtr then took over driving and Pt again felt like he needed to vomit and told dtr to pull over. He thought the car had stopped and he opened the door and fell out of the moving car. He laid on the ground for ~ 15 minutes. Pt says he possibly lost consciousness. States when he was lying on the ground felt very cold. Pt has had "terrible heartburn" for 2 days and felt chills for 2 days. Pt denies abdominal pain, CP, palp, SOB, cough, myalgias, rash, HA. Pt admits to chills, vomiting (4 episodes), diarrhea (from lactulose), dizziness, pedal edema, tingling in hands (chronic, intermittent). Pt denies recent travel, sick contacts, different eating patterns.    Pt states that early this week pt saw her Endocrinologist & started back on Metformin 500 mg daily as per Dr's recommendation.  In ED Pt's vitals were: T(C): 36.3, HR: 91, BP: 85/59, RR: 15, SpO2: 98% on RA  Labs significant for lactate 5.6, , Hgb 10.4, total bili 3.7, alk phos 183, ammonia 72, Mg 1.4. UA - trace LE, mod blood, RBC 6-10, urine glucose 1,000.   CXR - negative chest  Abd Xray - There is mild content in the colon and no overt sense of bowel obstruction.  CT head - no intracranial hemorrhage or mass effect  CT cervical - Multilevel degenerative disc disease/cervical spondylosis, with posterior osteophyte disc complex C6-7 resulting in flattening of the cervical cord. If there is a clinical concern for spinal cord injury, recommend further evaluation with MRI if there are no clinical contraindications.  EKG - NSR, 93bpm    Pt given 2L NS bolus, Zofran pantoprazole, famotidine, Carafate , Pt seen by GI Dr Morales in ER & was admitted for further Eval.    18: Pt seen, examined, Had total 4 bloody BM so far , Lactate improved, pt had CT A/P with IV Contrast. Drop in H/H. plan for, start Protonix drip,  2 u pRBC 1 u FFP & 5 mg Vit K x 1 as d/w Dr Franks -GI, Elevated ammonia level. Coagulopathy, Improving Lactate. Ac Blood loss Anemia.    18: Pt seen, Examined, pt was transferred to ICU on 18 for symptomatic upper GI Bleed, S/P 7  units pRBC in Total , 3 FFP, 1 platelets transfusion given, Pt had emergent EGD done that found Ac Bleeding Duodenal ulcer found. S/P Epi given. Homeostasis obtained, Pt seen By Sx Dr Hernandez. H/H Stable,  today with MISA, Coagulopathy , Leukocytosis & High Lactate level.  H/H Low stable with Ac Blood  Loss Anemia. Hematology Dr Rios called, as pt is followed by Dr Rios- H/O as out pt.    12/3/18: Patient seen and examined this morning at bedside. Patient denies any acute complaints this morning. Hgb drop from 8.2 --> 7.7. Leukocytosis present at 15.08. Lactate level drop from 3.2 --> 2.5    OVERNIGHT EVENTS: None    Home Medications:  Bacid (LAC) oral tablet: 1 tab(s) orally 2 times a day for  2weeks (2018 19:41)  HumaLOG 100 units/mL subcutaneous solution: 20 unit(s) subcutaneous 2 times a day(breakfast and lunch) (2018 19:41)  HumaLOG 100 units/mL subcutaneous solution: 30 unit(s) subcutaneous once a day(@dinner) (2018 19:41)  irbesartan 300 mg oral tablet: 1 tab(s) orally once a day (2018 19:41)  lactulose 10 g/15 mL oral syrup: 15 milliliter(s) orally 3 times a day (2018 19:41)  Lasix 20 mg oral tablet: 1 tab(s) orally once a day (2018 19:41)  pantoprazole 40 mg oral delayed release tablet: 1 tab(s) orally once a day (2018 19:41)  phytonadione 100 mcg oral tablet: 1 tab(s) orally once a day (2018 19:41)  potassium chloride 10 mEq oral tablet, extended release: 1 tab(s) orally once a day (2018 19:41)  pravastatin 40 mg oral tablet: 1 tab(s) orally once a day (2018 19:41)  Vitamin D3 2000 intl units oral tablet: 1 tab(s) orally once a day (2018 19:41)      MEDICATIONS  (STANDING):  dextrose 5%. 1000 milliLiter(s) (50 mL/Hr) IV Continuous <Continuous>  dextrose 50% Injectable 12.5 Gram(s) IV Push once  dextrose 50% Injectable 25 Gram(s) IV Push once  dextrose 50% Injectable 25 Gram(s) IV Push once  gabapentin 200 milliGRAM(s) Oral four times a day  insulin glargine Injectable (LANTUS) 22 Unit(s) SubCutaneous at bedtime  insulin lispro (HumaLOG) corrective regimen sliding scale   SubCutaneous every 6 hours  pantoprazole Infusion 8 mG/Hr (10 mL/Hr) IV Continuous <Continuous>  rifaximin 550 milliGRAM(s) Oral two times a day  sucralfate 1 Gram(s) Oral every 6 hours  ursodiol Capsule 300 milliGRAM(s) Oral three times a day    MEDICATIONS  (PRN):  dextrose 40% Gel 15 Gram(s) Oral once PRN Blood Glucose LESS THAN 70 milliGRAM(s)/deciliter  glucagon  Injectable 1 milliGRAM(s) IntraMuscular once PRN Glucose LESS THAN 70 milligrams/deciliter      Allergies    codeine (Anaphylaxis)    Intolerances    REVIEW OF SYSTEMS:   CONSTITUTIONAL: No fever, No chills, No fatigue, No myalgia, No Body ache, No Weakness  EYES: No eye pain,  No visual disturbances, No discharge, NO Redness  ENMT:  No ear pain, No nose bleed, No vertigo; No sinus or throat pain, No Congestion  NECK: No pain, No stiffness  RESPIRATORY: No cough, wheezing, No hemoptysis, No shortness of breath  CARDIOVASCULAR: No chest pain, palpitations  GASTROINTESTINAL: No abdominal or epigastric pain. No nausea, No vomiting; No diarrhea or constipation. [ x ] BM  GENITOURINARY: No dysuria, No frequency, No urgency, No hematuria, or incontinence  NEUROLOGICAL: No headaches, No dizziness, No numbness, No tingling, No tremors, No weakness  EXT: No Swelling, No Pain, No Edema  SKIN:  [ x ] No itching, burning, new rashes, or lesions   MUSCULOSKELETAL: No joint pain. slight swelling in the R hand (possible due to IV placement on dorsal aspect of hand), non-painful. No muscle pain, No back pain, No extremity pain  PSYCHIATRIC: No depression, anxiety, mood swings or difficulty sleeping at night  PAIN SCALE: [x  ] None  [  ] Other-  ROS Unable to obtain due to - [  ] Dementia  [  ] Lethargy  [  ] Sedated [  ] non verbal  REST OF REVIEW Of SYSTEM - [ x ] Normal     Vital Signs Last 24 Hrs  T(C): 37 (03 Dec 2018 08:00), Max: 37 (03 Dec 2018 08:00)  T(F): 98.6 (03 Dec 2018 08:00), Max: 98.6 (03 Dec 2018 08:00)  HR: 109 (03 Dec 2018 10:00) (109 - 119)  BP: 124/59 (03 Dec 2018 10:00) (110/62 - 152/54)  BP(mean): 84 (03 Dec 2018 10:00) (76 - 113)  RR: 19 (03 Dec 2018 10:00) (9 - 135)  SpO2: 94% (03 Dec 2018 10:00) (94% - 100%)     @ : @ 07:00  --------------------------------------------------------  IN: 1914 mL / OUT: 1030 mL / NET: 884 mL     @ 07:  -   @ 13:15  --------------------------------------------------------  IN: 50 mL / OUT: 220 mL / NET: -170 mL    PHYSICAL EXAM: Rt IJ TLC, I feel fine   GENERAL:  [x  ] NAD , [ x ] well appearing, [  ] Agitated, [  ] Drowsy,  [  ] Lethargy, [  ] confused   HEAD:  [ x ] Normal, [  ] Other  EYES:  [ x ] EOMI, [x  ] PERRLA, [ x ] conjunctiva and sclera clear normal, [  ] Other,  [x  ] Pallor,[  ] Discharge  ENMT:  [x  ] Normal, [ x ] Moist mucous membranes, [ x ] Good dentition, [ x ] No Thrush  NECK:  [x  ] Supple, [ x ] No JVD, [x  ] Normal thyroid, [  ] Lymphadenopathy [  ] Other  CHEST/LUNG:  [x  ] Clear to auscultation bilaterally, [ x ] Breath Sounds equal B/L,  [x  ] No rales, [x  ] No rhonchi  [ x ]  No wheezing  HEART:  [x  ] irregular rate and regular rhythm, [ x ] tachycardia, [  ] Bradycardia,  [  ] irregular  [ x] No murmurs, No rubs, No gallops, [  ] PPM in place (Mfr:  )  ABDOMEN:  [x  ] Soft, [ x ] Nontender, [x  ] Nondistended, [x  ] No mass, [x  ] Bowel sounds present, [ x ] obese  NERVOUS SYSTEM:  [x  ] Alert & Oriented X2, [ x ] Nonfocal  [  ] Confusion  [  ] Encephalopathic [  ] Sedated [  ] Unable to assess, [  ] Other-  EXTREMITIES: [ x ] 2+ Peripheral Pulses, No clubbing, No cyanosis,  [  ] edema B/L lower EXT. [  ] PVD stasis skin changes B/L Lower EXT  LYMPH: No lymphadenopathy noted  SKIN:  [ x ] No new rashes or lesions, [  ] Pressure Ulcers, [  ] ecchymosis, [  ] Skin Tears, [  ] Other    DIET: NPO    LABS:                        7.7    15.08 )-----------( 70       ( 03 Dec 2018 06:24 )             22.4     12-03    138  |  105  |  48<H>  ----------------------------<  168<H>  4.8   |  24  |  1.10    Ca    7.9<L>      03 Dec 2018 06:24  Phos  3.2     12  Mg     1.6     12    TPro  4.2<L>  /  Alb  2.1<L>  /  TBili  3.1<H>  /  DBili  1.10<H>  /  AST  38<H>  /  ALT  22  /  AlkPhos  71  12    LIVER FUNCTIONS - ( 03 Dec 2018 06:24 )  Alb: 2.1 g/dL / Pro: 4.2 g/dL / ALK PHOS: 71 U/L / ALT: 22 U/L / AST: 38 U/L / GGT: x           PT/INR - ( 03 Dec 2018 06:24 )   PT: 18.5 sec;   INR: 1.61 ratio      PTT - ( 03 Dec 2018 06:24 )  PTT:30.6 sec    Lactate, Blood (12.02.18 @ 12:52)    Lactate, Blood: 4.2 mmol/L    Hemoglobin A1C, Whole Blood (12.01.18 @ 10:32)  Hemoglobin A1C, Whole Blood: 8.3: Method: Immunoassay    Urinalysis Basic - ( 2018 18:30 )    Color: Yellow / Appearance: Slightly Turbid / S.015 / pH: x  Gluc: x / Ketone: Trace  / Bili: Small / Urobili: Negative   Blood: x / Protein: 25 mg/dL / Nitrite: Negative   Leuk Esterase: Trace / RBC: 6-10 /HPF / WBC 3-5   Sq Epi: x / Non Sq Epi: Occasional / Bacteria: Occasional      Culture Results:   No growth to date. ( @ 20:55)  Culture Results:   No growth to date. ( @ 20:55)  Culture Results:   <10,000 CFU/ml Normal Urogenital ninfa present ( @ 20:19)      culture blood  -- .Blood Blood  @ 20:55    culture urine  --   @ 20:55  culture blood  -- .Urine Clean Catch (Midstream)  @ 20:19    culture urine  --   @ 20:19      CARDIAC MARKERS ( 02 Dec 2018 08:40 )  x     / x     / 504 U/L / x     / x      CARDIAC MARKERS ( 2018 23:51 )  .019 ng/mL / x     / 301 U/L / x     / 2.8 ng/mL  CARDIAC MARKERS ( 2018 16:25 )  .019 ng/mL / x     / x     / x     / x          Culture - Blood (collected 2018 20:55)  Source: .Blood Blood  Preliminary Report (01 Dec 2018 21:01):    No growth to date.    Culture - Blood (collected 2018 20:55)  Source: .Blood Blood  Preliminary Report (01 Dec 2018 21:01):    No growth to date.    Culture - Urine (collected 2018 20:19)  Source: .Urine Clean Catch (Midstream)  Final Report (01 Dec 2018 15:16):    <10,000 CFU/ml Normal Urogenital ninfa present    Lipase, Serum: 70 U/L (18 @ 16:25)    RADIOLOGY & ADDITIONAL TESTS:  < from: Xray Chest 1 View- PORTABLE-Urgent (18 @ 23:39) >    EXAM:  XR CHEST PORTABLE URGENT 1V                            PROCEDURE DATE:  2018        INTERPRETATION:  Portable chest radiograph       CLINICAL INFORMATION: Line placement. GI bleeding.    TECHNIQUE:  Single portable frontal AP view ofthe chest was obtained.    FINDINGS: The examination of 10/17/2018 is available for review.    The lungs are free of infiltrate.     No pleural abnormality is seen.      The heart and mediastinum appear unchanged. The tip of the right IJ line   is projecting over the superior vena cava    No destructive lesion is seen in the visualized skeleton.    IMPRESSION:   No infiltrate.      < end of copied text >    HEALTH ISSUES - PROBLEM Dx:  GIB (gastrointestinal bleeding): GIB (gastrointestinal bleeding)  Lactic acid blood increased: Lactic acid blood increased  Prophylactic measure: Prophylactic measure  Pedal edema: Pedal edema  Neuropathy: Neuropathy  Hypercholesteremia: Hypercholesteremia  Hypertension: Hypertension  Fatty liver disease, nonalcoholic: Fatty liver disease, nonalcoholic  Hepatic encephalopathy: Hepatic encephalopathy  Diabetes: Diabetes  Near syncope: Near syncope  Vomiting: Vomiting      Consultant(s) Notes Reviewed:  [ x ] YES     Care Discussed with [X] Consultants  [  x] Patient  [  ] Family  [  ]   [  ] Social Service  [ x ] RN, [  ] Physical Therapy  DVT PPX: [  ] Lovenox, [  ] S C Heparin, [  ] Coumadin, [  ] Xarelto, [  ] Eliquis, [  ] Pradaxa, [  ] IV Heparin drip, [ x ] SCD [  ] Contraindication 2 to GI Bleed,[  ] Ambulation  Advanced directive: [x  ] None, [  ] DNR/DNI

## 2018-12-03 NOTE — PROGRESS NOTE ADULT - PROBLEM SELECTOR PLAN 2
- ? Etiology , Repeat level improving,  NOW leukocytosis,  pt is afebrile  -Pt had increased Lactate level from ER which had improved ;   Likely sec to re starting Metformin / hypotension   -  RVP- neg  - f/u BCx, UCx,-NEG S/P IV Fluids   -Serial lactate levels, - ? Etiology , Repeat level improving,  NOW leukocytosis at 15.08,  pt is afebrile  - Pt had increased Lactate level from ER which had improved to 2.5;     Likely sec to re starting Metformin / hypotension   -  RVP- neg  - Blood cx- negative  - UCx negative   -NEG S/P IV Fluids   -Serial lactate levels - ? Etiology , Repeat level improving,  NOW leukocytosis at 15.08,  pt is afebrile  - Pt had increased Lactate level from ER which had improved to 2.5;   -Leukocytosis -Reactive likely , CBC in AM    Likely sec to re starting Metformin / hypotension   -  RVP- neg  - Blood cx- negative  - UCx negative   -NEG S/P IV Fluids   -Serial lactate levels

## 2018-12-03 NOTE — PROGRESS NOTE ADULT - ATTENDING COMMENTS
62M PMH DM2, neuropathy, HTN, HLD, JEAN, hepatic encephalopathy, PUD, now admitted with duodenal ulcer with active bleeding, acute blood loss anemia and hypotension. Course complicated by MISA, lactic acidosis and coagulopathy - suspect DIC. He also has uncontrolled hyperglycemia. Repeat EGD today with large bleeding duodenal ulcer s/p clips x2, epi injection, and cautery.    - Hepatic encephalopathy, start rifaximin. Hold lactulose due to GI bleed  - HD stable, but with sinus tachycardia due to recurrent/persistent GI bleeding. Transfuse prbc's, monitor hemodynamics. Hold antiplatelets and a/c  - Stable respiratory status at presents, on room air with SpO2>90%  - Repeat EGD today with large bleeding duodenal ulcer s/p clips x2, epi injection, and cautery. Continue PPI gtt and sucralfate. Keep NPO. Hold a/c and antiplatelets. If continues to have bleeding, may need IR or surgical evaluation  - HGB 6.6, transfuse 2 more units prbc's. Give Vitamin K and FFP  - Resolved lactic acidosis  - MISA improving, strict I/O's, trend kidney function and lytes  - Observe off abx  - Continue lantus qhs and insulin coverage scale. When restarts po, will restart insulin lispro. A1c 8.8  - SCD's for DVT ppx  - Discussed with patient and wife, full code  CC time spent: 40 min

## 2018-12-03 NOTE — PROGRESS NOTE ADULT - PROBLEM SELECTOR PLAN 1
Bleeding Duodenal ulcer on Emergent EGD in ICU   -pt had multiple  bloody BM  2/2   rapid upper GI bleed   - Ac Blood loss Anemia with Bleeding DU   - hx of PUD, Esophagitis, Gastritis, non bleeding ulcers on last EGD10/18  - 3 u FFP, 7 u pRBC , 1 platelets & Vit K 5 mg x 1 dose given  - consult GI ([facility]), -on case, Sx Dr Hernandez on Case  - If further bleeding ---> plan to transfer to [facility] for IR / surgical intervention  - c/w Zofran q6hr, IV Protonix  Drip , Carafate q 6 hrs   - NPO with ice   - Serial CBC, -Coagulapathy with liver disease  -ICU care Bleeding Duodenal ulcer on Emergent EGD in ICU   -pt had multiple  bloody BM  2/2   rapid upper GI bleed   - Ac Blood loss Anemia with Bleeding DU   - hx of PUD, Esophagitis, Gastritis, non bleeding ulcers on last EGD10/18  - s/p 3 units FFP, 7 units pRBC, 1 platelets & Vit K 5 mg x 1 dose given  - GI ([facility]) following  - Dr Hernandez following  - If further bleeding ---> plan to transfer to [facility] for IR / surgical intervention  - c/w Zofran q6hr, IV Protonix  Drip , Carafate q 6 hrs   - NPO with ice   - Serial CBC, -Coagulapathy with liver disease  - ICU care Bleeding Duodenal ulcer on Emergent EGD in ICU   -pt had multiple  bloody BM  2/2   rapid upper GI bleed   - Ac Blood loss Anemia with Bleeding DU - H/H Low stable,  - hx of PUD, Esophagitis, Gastritis, non bleeding ulcers on last EGD10/18  - s/p 3 units FFP, 7 units pRBC, 1 platelets & Vit K 5 mg x 1 dose given  - GI ([facility]) following  - Dr Hernandez following  - If further bleeding ---> plan to transfer to [facility] for IR / surgical intervention  - c/w Zofran q6hr, IV Protonix  Drip , Carafate q 6 hrs   - NPO with ice   - Serial CBC, -Coagulapathy with liver disease  - ICU care

## 2018-12-03 NOTE — PROGRESS NOTE ADULT - ATTENDING COMMENTS
Pt seen, Examined, Case & Care plan d/w pt, residents, at detail.  D/W GI PA & ICU Team at detail.  Clear liquid diet  AM Labs , OOB to chair
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
## 2018-12-03 NOTE — PROGRESS NOTE ADULT - SUBJECTIVE AND OBJECTIVE BOX
Subjective: pt without abdominal pain, with further bloody bowel movements.    Objective:  Vital Signs Last 24 Hrs  T(C): 37 (03 Dec 2018 08:00), Max: 37 (03 Dec 2018 08:00)  T(F): 98.6 (03 Dec 2018 08:00), Max: 98.6 (03 Dec 2018 08:00)  HR: 109 (03 Dec 2018 10:00) (109 - 119)  BP: 124/59 (03 Dec 2018 10:00) (110/62 - 152/54)  BP(mean): 84 (03 Dec 2018 10:00) (76 - 113)  RR: 19 (03 Dec 2018 10:00) (9 - 135)  SpO2: 94% (03 Dec 2018 10:00) (94% - 100%)    Heent: MARCY MADRIGAL  Pul: clear  Cor: RSR, without murmurs  Abdomen: normal bowel sounds, without distension or tenderness  Extremities: without edema, motor/sensory intact, without calf pain, Homans sign negative.                        7.7    15.08 )-----------( 70       ( 03 Dec 2018 06:24 )             22.4       12-03    138  |  105  |  48<H>  ----------------------------<  168<H>  4.8   |  24  |  1.10    Ca    7.9<L>      03 Dec 2018 06:24  Phos  3.2     12-03  Mg     1.6     12-03    TPro  4.2<L>  /  Alb  2.1<L>  /  TBili  3.1<H>  /  DBili  1.10<H>  /  AST  38<H>  /  ALT  22  /  AlkPhos  71  12-03 12-02 @ 07:01  -  12-03 @ 07:00  --------------------------------------------------------  IN:    lactated ringers.: 1350 mL    pantoprazole Infusion: 240 mL  Total IN: 1590 mL    OUT:    Indwelling Catheter - Urethral: 1940 mL  Total OUT: 1940 mL    Total NET: -350 mL      12-03 @ 07:01  -  12-03 @ 10:42  --------------------------------------------------------  IN:    lactated ringers.: 300 mL    pantoprazole Infusion: 40 mL    Solution: 50 mL  Total IN: 390 mL    OUT:    Indwelling Catheter - Urethral: 115 mL  Total OUT: 115 mL    Total NET: 275 mL

## 2018-12-03 NOTE — PROGRESS NOTE ADULT - SUBJECTIVE AND OBJECTIVE BOX
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 document has been electronically signed. Dec  1 2018 11:04AM          Bedside ultrasound: Not performed.       CENTRAL LINE: RIJ (placed 12/1/18)  HAYES: Y  A-LINE: N    GLOBAL ISSUE/BEST PRACTICE:  Analgesia: N  Sedation: N  CAM-ICU: Neg  HOB elevation: N    Stress ulcer prophylaxis: Y  VTE prophylaxis: SCD in setting of GIB  Glycemic control: Y    Nutrition: NPO except meds    CODE STATUS: Full

## 2018-12-03 NOTE — PROGRESS NOTE ADULT - SUBJECTIVE AND OBJECTIVE BOX
Patient seen and examined;  resting comfortably  Chart reviewed and events noted;       MEDICATIONS  (STANDING):  dextrose 5%. 1000 milliLiter(s) (50 mL/Hr) IV Continuous <Continuous>  gabapentin 200 milliGRAM(s) Oral four times a day  insulin glargine Injectable (LANTUS) 22 Unit(s) SubCutaneous at bedtime  insulin lispro (HumaLOG) corrective regimen sliding scale   SubCutaneous every 6 hours  lactated ringers. 1000 milliLiter(s) (75 mL/Hr) IV Continuous <Continuous>  pantoprazole Infusion 8 mG/Hr (10 mL/Hr) IV Continuous <Continuous>  rifaximin 550 milliGRAM(s) Oral two times a day  sucralfate 1 Gram(s) Oral every 6 hours  ursodiol Capsule 300 milliGRAM(s) Oral three times a day    MEDICATIONS  (PRN):  dextrose 40% Gel 15 Gram(s) Oral once PRN Blood Glucose LESS THAN 70 milliGRAM(s)/deciliter  glucagon  Injectable 1 milliGRAM(s) IntraMuscular once PRN Glucose LESS THAN 70 milligrams/deciliter      Vital Signs Last 24 Hrs  T(C): 36.7 (03 Dec 2018 05:35), Max: 36.9 (02 Dec 2018 20:30)  T(F): 98 (03 Dec 2018 05:35), Max: 98.5 (02 Dec 2018 20:30)  HR: 112 (03 Dec 2018 07:00) (109 - 119)  BP: 122/59 (03 Dec 2018 07:00) (110/62 - 152/54)  BP(mean): 85 (03 Dec 2018 07:00) (76 - 113)  RR: 16 (03 Dec 2018 07:00) (9 - 135)  SpO2: 97% (03 Dec 2018 07:00) (94% - 100%)    PHYSICAL EXAM  General: adult in NAD  HEENT: clear oropharynx, anicteric sclera, pink conjunctivae  Neck: supple  CV: normal S1S2 with no murmur rubs or gallops  Lungs: clear to auscultation, no wheezes, no rhales  Abdomen: soft, obese,  non-tender non-distended, no hepato/splenomegaly  Ext: no clubbing cyanosis or edema  Skin: no rashes and no petichiae  Neuro: alert and oriented X3 no focal deficits      LABS:                        7.7    15.08 )-----------( 70       ( 03 Dec 2018 06:24 )             22.4     Hemoglobin: 7.7 g/dL (12-03 @ 06:24)  Hemoglobin: 8.2 g/dL (12-02 @ 16:58)  Hemoglobin: 8.4 g/dL (12-02 @ 12:52)  Hemoglobin: 8.5 g/dL (12-02 @ 08:40)  Hemoglobin: 6.2 g/dL (12-01 @ 20:46)    WBC Count: 15.08 K/uL (12-03 @ 06:24)  WBC Count: 10.80 K/uL (12-02 @ 16:58)  WBC Count: 15.98 K/uL (12-02 @ 12:52)  WBC Count: 16.55 K/uL (12-02 @ 08:40)  WBC Count: 8.42 K/uL (12-01 @ 20:46)    Platelet Count - Automated: 70 K/uL (12-03 @ 06:24)  Platelet Count - Automated: 100 K/uL (12-02 @ 16:58)  Platelet Count - Automated: 97 K/uL (12-02 @ 12:52)  Platelet Count - Automated: 97 K/uL (12-02 @ 08:40)  Platelet Count - Automated: 122 K/uL (12-01 @ 20:46)    12-03    138  |  105  |  48<H>  ----------------------------<  168<H>  4.8   |  24  |  1.10    Ca    7.9<L>      03 Dec 2018 06:24  Phos  3.2     12-03  Mg     1.6     12-03    TPro  4.0<L>  /  Alb  1.8<L>  /  TBili  3.3<H>  /  DBili  1.20<H>  /  AST  29  /  ALT  20  /  AlkPhos  72  12-02    PT/INR - ( 03 Dec 2018 06:24 )   PT: 18.5 sec;   INR: 1.61 ratio   PTT - ( 03 Dec 2018 06:24 )  PTT:30.6 sec

## 2018-12-03 NOTE — PROGRESS NOTE ADULT - PROBLEM SELECTOR PLAN 3
-elevated Bili -?? Etiology Coagulopathy 2/2 Liver disease   Abnormal LFT's , Bili & PT/PTT/INR - Dr Rios -Hem Follow up  - pt follows GI (Dr. Morales)   -cont ppi, sandra, rifaximin bid ,  - f/u CMP in AM -elevated Bili -?? Etiology Coagulopathy 2/2 Liver disease   Abnormal LFT's , Bili & PT/PTT/INR - Dr Rios -Hem Follow up  - pt follows GI (Dr. Morales)   -cont ppi, sandra, rifaximin bid  - f/u CMP in AM
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
## 2018-12-03 NOTE — PROGRESS NOTE ADULT - PROBLEM SELECTOR PLAN 1
hx of recent egd non bleeding esophageal ulcers, esophagitis, non bleeding duodenal ulcers, no EV/GV  brbpr x 2 overnight, no melena per nursing  ?lgib vs ?brisk upper  VS reviewed, hgb trend noted sp multiple IVF boluses  check cta to localize bleeding  may need IR/sx eval  ppi iv, carafate  trend h/h, coags, transfuse prn  hold ac asa nsaids  icu eval if hypotension persists  reports recent, normal colonoscopy  further recs pending above  may need egd/colon as outpt  plan dw attg and agreeable  will follow

## 2018-12-03 NOTE — PROGRESS NOTE ADULT - PROBLEM SELECTOR PLAN 4
likely 2/2 Hypotension, Pre Renal  Beasley cath I/O   BMP in AM  -HLOD ARB/Lasix likely 2/2 Hypotension, Pre Renal  Improving  Beasley cath I/O   BMP in AM  -HLOD ARB/Lasix

## 2018-12-04 DIAGNOSIS — D64.9 ANEMIA, UNSPECIFIED: ICD-10-CM

## 2018-12-04 LAB
ALBUMIN SERPL ELPH-MCNC: 2.2 G/DL — LOW (ref 3.3–5)
ALP SERPL-CCNC: 73 U/L — SIGNIFICANT CHANGE UP (ref 40–120)
ALT FLD-CCNC: 21 U/L — SIGNIFICANT CHANGE UP (ref 12–78)
AMMONIA BLD-MCNC: 80 UMOL/L — HIGH (ref 11–32)
ANION GAP SERPL CALC-SCNC: 7 MMOL/L — SIGNIFICANT CHANGE UP (ref 5–17)
APTT BLD: 27.7 SEC — LOW (ref 28.5–37)
AST SERPL-CCNC: 33 U/L — SIGNIFICANT CHANGE UP (ref 15–37)
BASOPHILS # BLD AUTO: 0.02 K/UL — SIGNIFICANT CHANGE UP (ref 0–0.2)
BASOPHILS NFR BLD AUTO: 0.2 % — SIGNIFICANT CHANGE UP (ref 0–2)
BILIRUB SERPL-MCNC: 3.6 MG/DL — HIGH (ref 0.2–1.2)
BLD GP AB SCN SERPL QL: SIGNIFICANT CHANGE UP
BUN SERPL-MCNC: 40 MG/DL — HIGH (ref 7–23)
CALCIUM SERPL-MCNC: 7.3 MG/DL — LOW (ref 8.5–10.1)
CHLORIDE SERPL-SCNC: 105 MMOL/L — SIGNIFICANT CHANGE UP (ref 96–108)
CO2 SERPL-SCNC: 28 MMOL/L — SIGNIFICANT CHANGE UP (ref 22–31)
CREAT SERPL-MCNC: 1 MG/DL — SIGNIFICANT CHANGE UP (ref 0.5–1.3)
EOSINOPHIL # BLD AUTO: 0.01 K/UL — SIGNIFICANT CHANGE UP (ref 0–0.5)
EOSINOPHIL NFR BLD AUTO: 0.1 % — SIGNIFICANT CHANGE UP (ref 0–6)
GLUCOSE SERPL-MCNC: 149 MG/DL — HIGH (ref 70–99)
HCT VFR BLD CALC: 20.9 % — CRITICAL LOW (ref 39–50)
HCT VFR BLD CALC: 22.4 % — LOW (ref 39–50)
HCT VFR BLD CALC: 22.5 % — LOW (ref 39–50)
HGB BLD-MCNC: 7.3 G/DL — LOW (ref 13–17)
HGB BLD-MCNC: 7.8 G/DL — LOW (ref 13–17)
HGB BLD-MCNC: 7.9 G/DL — LOW (ref 13–17)
IMM GRANULOCYTES NFR BLD AUTO: 1.7 % — HIGH (ref 0–1.5)
INR BLD: 1.61 RATIO — HIGH (ref 0.88–1.16)
LYMPHOCYTES # BLD AUTO: 2.18 K/UL — SIGNIFICANT CHANGE UP (ref 1–3.3)
LYMPHOCYTES # BLD AUTO: 23 % — SIGNIFICANT CHANGE UP (ref 13–44)
MAGNESIUM SERPL-MCNC: 1.7 MG/DL — SIGNIFICANT CHANGE UP (ref 1.6–2.6)
MCHC RBC-ENTMCNC: 30.5 PG — SIGNIFICANT CHANGE UP (ref 27–34)
MCHC RBC-ENTMCNC: 30.9 PG — SIGNIFICANT CHANGE UP (ref 27–34)
MCHC RBC-ENTMCNC: 30.9 PG — SIGNIFICANT CHANGE UP (ref 27–34)
MCHC RBC-ENTMCNC: 34.7 GM/DL — SIGNIFICANT CHANGE UP (ref 32–36)
MCHC RBC-ENTMCNC: 34.9 GM/DL — SIGNIFICANT CHANGE UP (ref 32–36)
MCHC RBC-ENTMCNC: 35.3 GM/DL — SIGNIFICANT CHANGE UP (ref 32–36)
MCV RBC AUTO: 87.5 FL — SIGNIFICANT CHANGE UP (ref 80–100)
MCV RBC AUTO: 87.9 FL — SIGNIFICANT CHANGE UP (ref 80–100)
MCV RBC AUTO: 88.6 FL — SIGNIFICANT CHANGE UP (ref 80–100)
MONOCYTES # BLD AUTO: 0.96 K/UL — HIGH (ref 0–0.9)
MONOCYTES NFR BLD AUTO: 10.1 % — SIGNIFICANT CHANGE UP (ref 2–14)
NEUTROPHILS # BLD AUTO: 6.14 K/UL — SIGNIFICANT CHANGE UP (ref 1.8–7.4)
NEUTROPHILS NFR BLD AUTO: 64.9 % — SIGNIFICANT CHANGE UP (ref 43–77)
NRBC # BLD: 0 /100 WBCS — SIGNIFICANT CHANGE UP (ref 0–0)
NRBC # BLD: 0 /100 WBCS — SIGNIFICANT CHANGE UP (ref 0–0)
PHOSPHATE SERPL-MCNC: 1.9 MG/DL — LOW (ref 2.5–4.5)
PLATELET # BLD AUTO: 94 K/UL — LOW (ref 150–400)
PLATELET # BLD AUTO: 96 K/UL — LOW (ref 150–400)
PLATELET # BLD AUTO: 98 K/UL — LOW (ref 150–400)
POTASSIUM SERPL-MCNC: 3.9 MMOL/L — SIGNIFICANT CHANGE UP (ref 3.5–5.3)
POTASSIUM SERPL-SCNC: 3.9 MMOL/L — SIGNIFICANT CHANGE UP (ref 3.5–5.3)
PROT SERPL-MCNC: 4.4 G/DL — LOW (ref 6–8.3)
PROTHROM AB SERPL-ACNC: 18.6 SEC — HIGH (ref 10–12.9)
RBC # BLD: 2.36 M/UL — LOW (ref 4.2–5.8)
RBC # BLD: 2.56 M/UL — LOW (ref 4.2–5.8)
RBC # BLD: 2.56 M/UL — LOW (ref 4.2–5.8)
RBC # FLD: 17.2 % — HIGH (ref 10.3–14.5)
RBC # FLD: 17.6 % — HIGH (ref 10.3–14.5)
RBC # FLD: 18 % — HIGH (ref 10.3–14.5)
SODIUM SERPL-SCNC: 140 MMOL/L — SIGNIFICANT CHANGE UP (ref 135–145)
WBC # BLD: 12.53 K/UL — HIGH (ref 3.8–10.5)
WBC # BLD: 9.47 K/UL — SIGNIFICANT CHANGE UP (ref 3.8–10.5)
WBC # BLD: 9.87 K/UL — SIGNIFICANT CHANGE UP (ref 3.8–10.5)
WBC # FLD AUTO: 12.53 K/UL — HIGH (ref 3.8–10.5)
WBC # FLD AUTO: 9.47 K/UL — SIGNIFICANT CHANGE UP (ref 3.8–10.5)
WBC # FLD AUTO: 9.87 K/UL — SIGNIFICANT CHANGE UP (ref 3.8–10.5)

## 2018-12-04 PROCEDURE — 99233 SBSQ HOSP IP/OBS HIGH 50: CPT | Mod: GC

## 2018-12-04 RX ORDER — MAGNESIUM SULFATE 500 MG/ML
2 VIAL (ML) INJECTION ONCE
Qty: 0 | Refills: 0 | Status: COMPLETED | OUTPATIENT
Start: 2018-12-04 | End: 2018-12-04

## 2018-12-04 RX ORDER — INSULIN GLARGINE 100 [IU]/ML
50 INJECTION, SOLUTION SUBCUTANEOUS AT BEDTIME
Qty: 0 | Refills: 0 | Status: DISCONTINUED | OUTPATIENT
Start: 2018-12-04 | End: 2018-12-06

## 2018-12-04 RX ORDER — LOSARTAN POTASSIUM 100 MG/1
100 TABLET, FILM COATED ORAL DAILY
Qty: 0 | Refills: 0 | Status: DISCONTINUED | OUTPATIENT
Start: 2018-12-04 | End: 2018-12-05

## 2018-12-04 RX ORDER — LACTULOSE 10 G/15ML
10 SOLUTION ORAL THREE TIMES A DAY
Qty: 0 | Refills: 0 | Status: DISCONTINUED | OUTPATIENT
Start: 2018-12-04 | End: 2018-12-04

## 2018-12-04 RX ORDER — FUROSEMIDE 40 MG
40 TABLET ORAL ONCE
Qty: 0 | Refills: 0 | Status: COMPLETED | OUTPATIENT
Start: 2018-12-04 | End: 2018-12-04

## 2018-12-04 RX ORDER — INSULIN LISPRO 100/ML
VIAL (ML) SUBCUTANEOUS
Qty: 0 | Refills: 0 | Status: DISCONTINUED | OUTPATIENT
Start: 2018-12-04 | End: 2018-12-06

## 2018-12-04 RX ORDER — POTASSIUM PHOSPHATE, MONOBASIC POTASSIUM PHOSPHATE, DIBASIC 236; 224 MG/ML; MG/ML
15 INJECTION, SOLUTION INTRAVENOUS ONCE
Qty: 0 | Refills: 0 | Status: COMPLETED | OUTPATIENT
Start: 2018-12-04 | End: 2018-12-04

## 2018-12-04 RX ORDER — LACTULOSE 10 G/15ML
15 SOLUTION ORAL
Qty: 0 | Refills: 0 | Status: DISCONTINUED | OUTPATIENT
Start: 2018-12-04 | End: 2018-12-11

## 2018-12-04 RX ADMIN — PANTOPRAZOLE SODIUM 10 MG/HR: 20 TABLET, DELAYED RELEASE ORAL at 13:00

## 2018-12-04 RX ADMIN — Medication 1 GRAM(S): at 23:02

## 2018-12-04 RX ADMIN — INSULIN GLARGINE 50 UNIT(S): 100 INJECTION, SOLUTION SUBCUTANEOUS at 21:08

## 2018-12-04 RX ADMIN — Medication 50 GRAM(S): at 09:04

## 2018-12-04 RX ADMIN — Medication 1 GRAM(S): at 05:53

## 2018-12-04 RX ADMIN — Medication 40 MILLIGRAM(S): at 05:52

## 2018-12-04 RX ADMIN — URSODIOL 300 MILLIGRAM(S): 250 TABLET, FILM COATED ORAL at 14:05

## 2018-12-04 RX ADMIN — ATORVASTATIN CALCIUM 10 MILLIGRAM(S): 80 TABLET, FILM COATED ORAL at 21:08

## 2018-12-04 RX ADMIN — Medication 1: at 05:54

## 2018-12-04 RX ADMIN — Medication 1 GRAM(S): at 12:23

## 2018-12-04 RX ADMIN — POTASSIUM PHOSPHATE, MONOBASIC POTASSIUM PHOSPHATE, DIBASIC 62.5 MILLIMOLE(S): 236; 224 INJECTION, SOLUTION INTRAVENOUS at 09:05

## 2018-12-04 RX ADMIN — Medication 1 GRAM(S): at 18:40

## 2018-12-04 RX ADMIN — GABAPENTIN 200 MILLIGRAM(S): 400 CAPSULE ORAL at 12:23

## 2018-12-04 RX ADMIN — URSODIOL 300 MILLIGRAM(S): 250 TABLET, FILM COATED ORAL at 05:53

## 2018-12-04 RX ADMIN — LACTULOSE 15 GRAM(S): 10 SOLUTION ORAL at 18:39

## 2018-12-04 RX ADMIN — GABAPENTIN 200 MILLIGRAM(S): 400 CAPSULE ORAL at 18:40

## 2018-12-04 RX ADMIN — GABAPENTIN 200 MILLIGRAM(S): 400 CAPSULE ORAL at 05:53

## 2018-12-04 RX ADMIN — URSODIOL 300 MILLIGRAM(S): 250 TABLET, FILM COATED ORAL at 21:08

## 2018-12-04 RX ADMIN — SODIUM CHLORIDE 75 MILLILITER(S): 9 INJECTION, SOLUTION INTRAVENOUS at 05:55

## 2018-12-04 RX ADMIN — LOSARTAN POTASSIUM 100 MILLIGRAM(S): 100 TABLET, FILM COATED ORAL at 18:40

## 2018-12-04 RX ADMIN — Medication 1: at 21:09

## 2018-12-04 RX ADMIN — GABAPENTIN 200 MILLIGRAM(S): 400 CAPSULE ORAL at 23:02

## 2018-12-04 RX ADMIN — Medication 1: at 18:39

## 2018-12-04 NOTE — PROGRESS NOTE ADULT - PROBLEM SELECTOR PLAN 3
see above, suspected JEAN  no ascites on CT  cont ppi, rifaximin, sandra, lactulose as able  trend lfts  will follow

## 2018-12-04 NOTE — PROGRESS NOTE ADULT - SUBJECTIVE AND OBJECTIVE BOX
Patient is a 62y old  Male who presents with a chief complaint of vomiting and near syncope, upper GI bleeding (02 Dec 2018 13:00)      INTERVAL HPI:  Pt is a 61 y/o M with PMHx of Type 2 DM, HTN, HLD, obesity, nephrolithiasis (last stone 25 years ago), neuropathy, HE, NAFLD, Hyper ammonemia, recent Gastritis/ esophagitis on EGD & Gram variable teddy bacteremia in 10/2018  who presents with vomiting and  near syncope/falling out of a moving car. Pt [place] and [place] "[place]", his vision was cloudy, he pulled over the car and stopped and vomited. Vomitus was "creamy white." Pt denies seeing blood in vomitus. His dtr then took over driving and Pt again felt like he needed to vomit and told dtr to pull over. He thought the car had stopped and he opened the door and fell out of the moving car. He laid on the ground for ~ 15 minutes. Pt says he possibly lost consciousness. States when he was lying on the ground felt very cold. Pt has had "terrible heartburn" for 2 days and felt chills for 2 days. Pt denies abdominal pain, CP, palp, SOB, cough, myalgias, rash, HA. Pt admits to chills, vomiting (4 episodes), diarrhea (from lactulose), dizziness, pedal edema, tingling in hands (chronic, intermittent). Pt denies recent travel, sick contacts, different eating patterns.    Pt states that early this week pt saw her Endocrinologist & started back on Metformin 500 mg daily as per Dr's recommendation.  In ED Pt's vitals were: T(C): 36.3, HR: 91, BP: 85/59, RR: 15, SpO2: 98% on RA  Labs significant for lactate 5.6, , Hgb 10.4, total bili 3.7, alk phos 183, ammonia 72, Mg 1.4. UA - trace LE, mod blood, RBC 6-10, urine glucose 1,000.   CXR - negative chest  Abd Xray - There is mild content in the colon and no overt sense of bowel obstruction.  CT head - no intracranial hemorrhage or mass effect  CT cervical - Multilevel degenerative disc disease/cervical spondylosis, with posterior osteophyte disc complex C6-7 resulting in flattening of the cervical cord. If there is a clinical concern for spinal cord injury, recommend further evaluation with MRI if there are no clinical contraindications.  EKG - NSR, 93bpm    Pt given 2L NS bolus, Zofran pantoprazole, famotidine, Carafate , Pt seen by GI Dr Morales in ER & was admitted for further Eval.    18: Pt seen, examined, Had total 4 bloody BM so far , Lactate improved, pt had CT A/P with IV Contrast. Drop in H/H. plan for, start Protonix drip,  2 u pRBC 1 u FFP & 5 mg Vit K x 1 as d/w Dr Franks -GI, Elevated ammonia level. Coagulopathy, Improving Lactate. Ac Blood loss Anemia.    18: Pt seen, Examined, pt was transferred to ICU on 18 for symptomatic upper GI Bleed, S/P 7  units pRBC in Total , 3 FFP, 1 platelets transfusion given, Pt had emergent EGD done that found Ac Bleeding Duodenal ulcer found. S/P Epi given. Homeostasis obtained, Pt seen By Sx Dr Hernandez. H/H Stable,  today with MISA, Coagulopathy , Leukocytosis & High Lactate level.  H/H Low stable with Ac Blood  Loss Anemia. Hematology Dr Rios called, as pt is followed by Dr Rios- H/O as out pt.    12/3/18: Patient seen and examined this morning at bedside. Patient denies any acute complaints this morning. Hgb drop from 8.2 --> 7.7. Leukocytosis present at 15.08. Lactate level drop from 3.2 --> 2.5    OVERNIGHT EVENTS: None    Home Medications:  Bacid (LAC) oral tablet: 1 tab(s) orally 2 times a day for  2weeks (2018 19:41)  HumaLOG 100 units/mL subcutaneous solution: 20 unit(s) subcutaneous 2 times a day(breakfast and lunch) (2018 19:41)  HumaLOG 100 units/mL subcutaneous solution: 30 unit(s) subcutaneous once a day(@dinner) (2018 19:41)  irbesartan 300 mg oral tablet: 1 tab(s) orally once a day (2018 19:41)  lactulose 10 g/15 mL oral syrup: 15 milliliter(s) orally 3 times a day (2018 19:41)  Lasix 20 mg oral tablet: 1 tab(s) orally once a day (2018 19:41)  pantoprazole 40 mg oral delayed release tablet: 1 tab(s) orally once a day (2018 19:41)  phytonadione 100 mcg oral tablet: 1 tab(s) orally once a day (2018 19:41)  potassium chloride 10 mEq oral tablet, extended release: 1 tab(s) orally once a day (2018 19:41)  pravastatin 40 mg oral tablet: 1 tab(s) orally once a day (2018 19:41)  Vitamin D3 2000 intl units oral tablet: 1 tab(s) orally once a day (2018 19:41)      MEDICATIONS  (STANDING):  dextrose 5%. 1000 milliLiter(s) (50 mL/Hr) IV Continuous <Continuous>  dextrose 50% Injectable 12.5 Gram(s) IV Push once  dextrose 50% Injectable 25 Gram(s) IV Push once  dextrose 50% Injectable 25 Gram(s) IV Push once  gabapentin 200 milliGRAM(s) Oral four times a day  insulin glargine Injectable (LANTUS) 22 Unit(s) SubCutaneous at bedtime  insulin lispro (HumaLOG) corrective regimen sliding scale   SubCutaneous every 6 hours  pantoprazole Infusion 8 mG/Hr (10 mL/Hr) IV Continuous <Continuous>  rifaximin 550 milliGRAM(s) Oral two times a day  sucralfate 1 Gram(s) Oral every 6 hours  ursodiol Capsule 300 milliGRAM(s) Oral three times a day    MEDICATIONS  (PRN):  dextrose 40% Gel 15 Gram(s) Oral once PRN Blood Glucose LESS THAN 70 milliGRAM(s)/deciliter  glucagon  Injectable 1 milliGRAM(s) IntraMuscular once PRN Glucose LESS THAN 70 milligrams/deciliter      Allergies    codeine (Anaphylaxis)    Intolerances    REVIEW OF SYSTEMS:   CONSTITUTIONAL: No fever, No chills, No fatigue, No myalgia, No Body ache, No Weakness  EYES: No eye pain,  No visual disturbances, No discharge, NO Redness  ENMT:  No ear pain, No nose bleed, No vertigo; No sinus or throat pain, No Congestion  NECK: No pain, No stiffness  RESPIRATORY: No cough, wheezing, No hemoptysis, No shortness of breath  CARDIOVASCULAR: No chest pain, palpitations  GASTROINTESTINAL: No abdominal or epigastric pain. No nausea, No vomiting; No diarrhea or constipation. [ x ] BM  GENITOURINARY: No dysuria, No frequency, No urgency, No hematuria, or incontinence  NEUROLOGICAL: No headaches, No dizziness, No numbness, No tingling, No tremors, No weakness  EXT: No Swelling, No Pain, No Edema  SKIN:  [ x ] No itching, burning, new rashes, or lesions   MUSCULOSKELETAL: No joint pain. slight swelling in the R hand (possible due to IV placement on dorsal aspect of hand), non-painful. No muscle pain, No back pain, No extremity pain  PSYCHIATRIC: No depression, anxiety, mood swings or difficulty sleeping at night  PAIN SCALE: [x  ] None  [  ] Other-  ROS Unable to obtain due to - [  ] Dementia  [  ] Lethargy  [  ] Sedated [  ] non verbal  REST OF REVIEW Of SYSTEM - [ x ] Normal     Vital Signs Last 24 Hrs  T(C): 37 (03 Dec 2018 08:00), Max: 37 (03 Dec 2018 08:00)  T(F): 98.6 (03 Dec 2018 08:00), Max: 98.6 (03 Dec 2018 08:00)  HR: 109 (03 Dec 2018 10:00) (109 - 119)  BP: 124/59 (03 Dec 2018 10:00) (110/62 - 152/54)  BP(mean): 84 (03 Dec 2018 10:00) (76 - 113)  RR: 19 (03 Dec 2018 10:00) (9 - 135)  SpO2: 94% (03 Dec 2018 10:00) (94% - 100%)     @ : @ 07:00  --------------------------------------------------------  IN: 1914 mL / OUT: 1030 mL / NET: 884 mL     @ 07:  -   @ 13:15  --------------------------------------------------------  IN: 50 mL / OUT: 220 mL / NET: -170 mL    PHYSICAL EXAM: Rt IJ TLC, I feel fine   GENERAL:  [x  ] NAD , [ x ] well appearing, [  ] Agitated, [  ] Drowsy,  [  ] Lethargy, [  ] confused   HEAD:  [ x ] Normal, [  ] Other  EYES:  [ x ] EOMI, [x  ] PERRLA, [ x ] conjunctiva and sclera clear normal, [  ] Other,  [x  ] Pallor,[  ] Discharge  ENMT:  [x  ] Normal, [ x ] Moist mucous membranes, [ x ] Good dentition, [ x ] No Thrush  NECK:  [x  ] Supple, [ x ] No JVD, [x  ] Normal thyroid, [  ] Lymphadenopathy [  ] Other  CHEST/LUNG:  [x  ] Clear to auscultation bilaterally, [ x ] Breath Sounds equal B/L,  [x  ] No rales, [x  ] No rhonchi  [ x ]  No wheezing  HEART:  [x  ] irregular rate and regular rhythm, [ x ] tachycardia, [  ] Bradycardia,  [  ] irregular  [ x] No murmurs, No rubs, No gallops, [  ] PPM in place (Mfr:  )  ABDOMEN:  [x  ] Soft, [ x ] Nontender, [x  ] Nondistended, [x  ] No mass, [x  ] Bowel sounds present, [ x ] obese  NERVOUS SYSTEM:  [x  ] Alert & Oriented X2, [ x ] Nonfocal  [  ] Confusion  [  ] Encephalopathic [  ] Sedated [  ] Unable to assess, [  ] Other-  EXTREMITIES: [ x ] 2+ Peripheral Pulses, No clubbing, No cyanosis,  [  ] edema B/L lower EXT. [  ] PVD stasis skin changes B/L Lower EXT  LYMPH: No lymphadenopathy noted  SKIN:  [ x ] No new rashes or lesions, [  ] Pressure Ulcers, [  ] ecchymosis, [  ] Skin Tears, [  ] Other    DIET: NPO    LABS:                        7.7    15.08 )-----------( 70       ( 03 Dec 2018 06:24 )             22.4     12-03    138  |  105  |  48<H>  ----------------------------<  168<H>  4.8   |  24  |  1.10    Ca    7.9<L>      03 Dec 2018 06:24  Phos  3.2     12  Mg     1.6     12    TPro  4.2<L>  /  Alb  2.1<L>  /  TBili  3.1<H>  /  DBili  1.10<H>  /  AST  38<H>  /  ALT  22  /  AlkPhos  71  12    LIVER FUNCTIONS - ( 03 Dec 2018 06:24 )  Alb: 2.1 g/dL / Pro: 4.2 g/dL / ALK PHOS: 71 U/L / ALT: 22 U/L / AST: 38 U/L / GGT: x           PT/INR - ( 03 Dec 2018 06:24 )   PT: 18.5 sec;   INR: 1.61 ratio      PTT - ( 03 Dec 2018 06:24 )  PTT:30.6 sec    Lactate, Blood (12.02.18 @ 12:52)    Lactate, Blood: 4.2 mmol/L    Hemoglobin A1C, Whole Blood (12.01.18 @ 10:32)  Hemoglobin A1C, Whole Blood: 8.3: Method: Immunoassay    Urinalysis Basic - ( 2018 18:30 )    Color: Yellow / Appearance: Slightly Turbid / S.015 / pH: x  Gluc: x / Ketone: Trace  / Bili: Small / Urobili: Negative   Blood: x / Protein: 25 mg/dL / Nitrite: Negative   Leuk Esterase: Trace / RBC: 6-10 /HPF / WBC 3-5   Sq Epi: x / Non Sq Epi: Occasional / Bacteria: Occasional      Culture Results:   No growth to date. ( @ 20:55)  Culture Results:   No growth to date. ( @ 20:55)  Culture Results:   <10,000 CFU/ml Normal Urogenital ninfa present ( @ 20:19)      culture blood  -- .Blood Blood  @ 20:55    culture urine  --   @ 20:55  culture blood  -- .Urine Clean Catch (Midstream)  @ 20:19    culture urine  --   @ 20:19      CARDIAC MARKERS ( 02 Dec 2018 08:40 )  x     / x     / 504 U/L / x     / x      CARDIAC MARKERS ( 2018 23:51 )  .019 ng/mL / x     / 301 U/L / x     / 2.8 ng/mL  CARDIAC MARKERS ( 2018 16:25 )  .019 ng/mL / x     / x     / x     / x          Culture - Blood (collected 2018 20:55)  Source: .Blood Blood  Preliminary Report (01 Dec 2018 21:01):    No growth to date.    Culture - Blood (collected 2018 20:55)  Source: .Blood Blood  Preliminary Report (01 Dec 2018 21:01):    No growth to date.    Culture - Urine (collected 2018 20:19)  Source: .Urine Clean Catch (Midstream)  Final Report (01 Dec 2018 15:16):    <10,000 CFU/ml Normal Urogenital ninfa present    Lipase, Serum: 70 U/L (18 @ 16:25)    RADIOLOGY & ADDITIONAL TESTS:  < from: Xray Chest 1 View- PORTABLE-Urgent (18 @ 23:39) >    EXAM:  XR CHEST PORTABLE URGENT 1V                            PROCEDURE DATE:  2018        INTERPRETATION:  Portable chest radiograph       CLINICAL INFORMATION: Line placement. GI bleeding.    TECHNIQUE:  Single portable frontal AP view ofthe chest was obtained.    FINDINGS: The examination of 10/17/2018 is available for review.    The lungs are free of infiltrate.     No pleural abnormality is seen.      The heart and mediastinum appear unchanged. The tip of the right IJ line   is projecting over the superior vena cava    No destructive lesion is seen in the visualized skeleton.    IMPRESSION:   No infiltrate.      < end of copied text >    HEALTH ISSUES - PROBLEM Dx:  GIB (gastrointestinal bleeding): GIB (gastrointestinal bleeding)  Lactic acid blood increased: Lactic acid blood increased  Prophylactic measure: Prophylactic measure  Pedal edema: Pedal edema  Neuropathy: Neuropathy  Hypercholesteremia: Hypercholesteremia  Hypertension: Hypertension  Fatty liver disease, nonalcoholic: Fatty liver disease, nonalcoholic  Hepatic encephalopathy: Hepatic encephalopathy  Diabetes: Diabetes  Near syncope: Near syncope  Vomiting: Vomiting      Consultant(s) Notes Reviewed:  [ x ] YES     Care Discussed with [X] Consultants  [  x] Patient  [  ] Family  [  ]   [  ] Social Service  [ x ] RN, [  ] Physical Therapy  DVT PPX: [  ] Lovenox, [  ] S C Heparin, [  ] Coumadin, [  ] Xarelto, [  ] Eliquis, [  ] Pradaxa, [  ] IV Heparin drip, [ x ] SCD [  ] Contraindication 2 to GI Bleed,[  ] Ambulation  Advanced directive: [x  ] None, [  ] DNR/DNI Patient is a 62y old  Male who presents with a chief complaint of vomiting and near syncope, upper GI bleeding (02 Dec 2018 13:00)      INTERVAL HPI:  Pt is a 61 y/o M with PMHx of Type 2 DM, HTN, HLD, obesity, nephrolithiasis (last stone 25 years ago), neuropathy, HE, NAFLD, Hyper ammonemia, recent Gastritis/ esophagitis on EGD & Gram variable teddy bacteremia in 10/2018  who presents with vomiting and  near syncope/falling out of a moving car. Pt [place] and [place]", his vision was cloudy, he pulled over the car and stopped and vomited. Vomitus was "creamy white." Pt denies seeing blood in vomitus. His dtr then took over driving and Pt again felt like he needed to vomit and told dtr to pull over. He thought the car had stopped and he opened the door and fell out of the moving car. He laid on the ground for ~ 15 minutes. Pt says he possibly lost consciousness. States when he was lying on the ground felt very cold. Pt has had "terrible heartburn" for 2 days and felt chills for 2 days. Pt denies abdominal pain, CP, palp, SOB, cough, myalgias, rash, HA. Pt admits to chills, vomiting (4 episodes), diarrhea (from lactulose), dizziness, pedal edema, tingling in hands (chronic, intermittent). Pt denies recent travel, sick contacts, different eating patterns.    Pt states that early this week pt saw her Endocrinologist & started back on Metformin 500 mg daily as per Dr's recommendation.  In ED Pt's vitals were: T(C): 36.3, HR: 91, BP: 85/59, RR: 15, SpO2: 98% on RA  Labs significant for lactate 5.6, , Hgb 10.4, total bili 3.7, alk phos 183, ammonia 72, Mg 1.4. UA - trace LE, mod blood, RBC 6-10, urine glucose 1,000.   CXR - negative chest  Abd Xray - There is mild content in the colon and no overt sense of bowel obstruction.  CT head - no intracranial hemorrhage or mass effect  CT cervical - Multilevel degenerative disc disease/cervical spondylosis, with posterior osteophyte disc complex C6-7 resulting in flattening of the cervical cord. If there is a clinical concern for spinal cord injury, recommend further evaluation with MRI if there are no clinical contraindications.  EKG - NSR, 93bpm    Pt given 2L NS bolus, Zofran pantoprazole, famotidine, Carafate , Pt seen by GI Dr Morales in ER & was admitted for further Eval.    18: Pt seen, examined, Had total 4 bloody BM so far , Lactate improved, pt had CT A/P with IV Contrast. Drop in H/H. plan for, start Protonix drip,  2 u pRBC 1 u FFP & 5 mg Vit K x 1 as d/w Dr Franks -GI, Elevated ammonia level. Coagulopathy, Improving Lactate. Ac Blood loss Anemia.    18: Pt seen, Examined, pt was transferred to ICU on 18 for symptomatic upper GI Bleed, S/P 7  units pRBC in Total , 3 FFP, 1 platelets transfusion given, Pt had emergent EGD done that found Ac Bleeding Duodenal ulcer found. S/P Epi given. Homeostasis obtained, Pt seen By Sx Dr Hernandez. H/H Stable,  today with MISA, Coagulopathy , Leukocytosis & High Lactate level.  H/H Low stable with Ac Blood  Loss Anemia. Hematology Dr Rios called, as pt is followed by Dr Rios- H/O as out pt.    12/3/18: Patient seen and examined this morning at bedside. Patient denies any acute complaints this morning. Hgb drop from 8.2 --> 7.7. Leukocytosis present at 15.08. Lactate level drop from 3.2 --> 2.5    19: Patient seen and examined this morning. Patient was sitting out of bed in chair, awake, alert, and oriented x3. Patient states he is feeling better since yesterday. Patient has EGD done yesterday that showed large bleeding Duodenal ulcer s/p clips x2, epi injection, and cautery. Patient was given 3 units of packed red blood cell yesterday. Hbg increased from 7.3 --> 7.8. WBC count drop from 12.53 --> 9.47. Patient tolerating diet well. Patient denies fever, chills, headache, weakness chest pain, palpitations, sob, N/V, diarrhea, constipation, blood in stool, urinary symptoms. Patient admits to feeling fatigued. Has not had a BM today.     OVERNIGHT EVENTS: None    Home Medications:  Bacid (LAC) oral tablet: 1 tab(s) orally 2 times a day for  2weeks (2018 19:41)  HumaLOG 100 units/mL subcutaneous solution: 20 unit(s) subcutaneous 2 times a day(breakfast and lunch) (2018 19:41)  HumaLOG 100 units/mL subcutaneous solution: 30 unit(s) subcutaneous once a day(@dinner) (2018 19:41)  irbesartan 300 mg oral tablet: 1 tab(s) orally once a day (2018 19:41)  lactulose 10 g/15 mL oral syrup: 15 milliliter(s) orally 3 times a day (2018 19:41)  Lasix 20 mg oral tablet: 1 tab(s) orally once a day (2018 19:41)  pantoprazole 40 mg oral delayed release tablet: 1 tab(s) orally once a day (2018 19:41)  phytonadione 100 mcg oral tablet: 1 tab(s) orally once a day (2018 19:41)  potassium chloride 10 mEq oral tablet, extended release: 1 tab(s) orally once a day (2018 19:41)  pravastatin 40 mg oral tablet: 1 tab(s) orally once a day (2018 19:41)  Vitamin D3 2000 intl units oral tablet: 1 tab(s) orally once a day (2018 19:41)      MEDICATIONS  (STANDING):  dextrose 5%. 1000 milliLiter(s) (50 mL/Hr) IV Continuous <Continuous>  dextrose 50% Injectable 12.5 Gram(s) IV Push once  dextrose 50% Injectable 25 Gram(s) IV Push once  dextrose 50% Injectable 25 Gram(s) IV Push once  gabapentin 200 milliGRAM(s) Oral four times a day  insulin glargine Injectable (LANTUS) 22 Unit(s) SubCutaneous at bedtime  insulin lispro (HumaLOG) corrective regimen sliding scale   SubCutaneous every 6 hours  pantoprazole Infusion 8 mG/Hr (10 mL/Hr) IV Continuous <Continuous>  rifaximin 550 milliGRAM(s) Oral two times a day  sucralfate 1 Gram(s) Oral every 6 hours  ursodiol Capsule 300 milliGRAM(s) Oral three times a day    MEDICATIONS  (PRN):  dextrose 40% Gel 15 Gram(s) Oral once PRN Blood Glucose LESS THAN 70 milliGRAM(s)/deciliter  glucagon  Injectable 1 milliGRAM(s) IntraMuscular once PRN Glucose LESS THAN 70 milligrams/deciliter      Allergies    codeine (Anaphylaxis)    Intolerances    REVIEW OF SYSTEMS:   CONSTITUTIONAL: No fever, No chills No myalgia, No Body ache, No Weakness. Admits to fatigue.  EYES: No eye pain,  No visual disturbances, No discharge, NO Redness  ENMT:  No ear pain, No nose bleed, No vertigo; No sinus or throat pain, No Congestion  NECK: No pain, No stiffness  RESPIRATORY: No cough, wheezing, No hemoptysis, No shortness of breath  CARDIOVASCULAR: No chest pain, palpitations  GASTROINTESTINAL: No abdominal or epigastric pain. No nausea, No vomiting; No diarrhea or constipation. [ ] BM  GENITOURINARY: No dysuria, No frequency, No urgency, No hematuria, or incontinence  NEUROLOGICAL: No headaches, No dizziness, No numbness, No tingling, No tremors, No weakness  EXT: No Swelling, No Pain, No Edema  SKIN:  [ x ] No itching, burning, new rashes, or lesions   MUSCULOSKELETAL: No joint pain. slight swelling in the R hand (possible due to IV placement on dorsal aspect of hand), non-painful. No muscle pain, No back pain, No extremity pain  PSYCHIATRIC: No depression, anxiety, mood swings or difficulty sleeping at night  PAIN SCALE: [x  ] None  [  ] Other-  ROS Unable to obtain due to - [  ] Dementia  [  ] Lethargy  [  ] Sedated [  ] non verbal  REST OF REVIEW Of SYSTEM - [ x ] Normal     Vital Signs Last 24 Hrs  T(C): 37 (03 Dec 2018 08:00), Max: 37 (03 Dec 2018 08:00)  T(F): 98.6 (03 Dec 2018 08:00), Max: 98.6 (03 Dec 2018 08:00)  HR: 109 (03 Dec 2018 10:00) (109 - 119)  BP: 124/59 (03 Dec 2018 10:00) (110/62 - 152/54)  BP(mean): 84 (03 Dec 2018 10:00) (76 - 113)  RR: 19 (03 Dec 2018 10:00) (9 - 135)  SpO2: 94% (03 Dec 2018 10:00) (94% - 100%)     @ :  -   @ 07:00  --------------------------------------------------------  IN: 1914 mL / OUT: 1030 mL / NET: 884 mL     @ 07:  -   @ 13:15  --------------------------------------------------------  IN: 50 mL / OUT: 220 mL / NET: -170 mL    PHYSICAL EXAM: Rt IJ TLC   GENERAL:  [x  ] NAD , [ x ] well appearing, [  ] Agitated, [  ] Drowsy,  [  ] Lethargy, [  ] confused   HEAD:  [ x ] Normal, [  ] Other  EYES:  [ x ] EOMI, [x  ] PERRLA, [ x ] conjunctiva and sclera clear normal, [  ] Other,  [x  ] Pallor,[  ] Discharge  ENMT:  [x  ] Normal, [ x ] Moist mucous membranes, [ x ] Good dentition, [ x ] No Thrush  NECK:  [x  ] Supple, [ x ] No JVD, [x  ] Normal thyroid, [  ] Lymphadenopathy [  ] Other  CHEST/LUNG:  [x  ] Clear to auscultation bilaterally, [ x ] Breath Sounds equal B/L,  [x  ] No rales, [x  ] No rhonchi  [ x ]  No wheezing  HEART:  [x  ] irregular rate and regular rhythm, [ x ] tachycardia, [  ] Bradycardia,  [  ] irregular  [ x] No murmurs, No rubs, No gallops, [  ] PPM in place (Mfr:  )  ABDOMEN:  [x  ] Soft, [ x ] Nontender, [x  ] Nondistended, [x  ] No mass, [x  ] Bowel sounds present, [ x ] obese  NERVOUS SYSTEM:  [x  ] Alert & Oriented X3, [ x ] Nonfocal  [  ] Confusion  [  ] Encephalopathic [  ] Sedated [  ] Unable to assess, [  ] Other-  EXTREMITIES: [ x ] 2+ Peripheral Pulses, No clubbing, No cyanosis,  [  ] edema B/L lower EXT. [  ] PVD stasis skin changes B/L Lower EXT  LYMPH: No lymphadenopathy noted  SKIN:  [ x ] No new rashes or lesions, [  ] Pressure Ulcers, [  ] ecchymosis, [  ] Skin Tears, [  ] Other    DIET: NPO    LABS:                        7.7    15.08 )-----------( 70       ( 03 Dec 2018 06:24 )             22.4     12    138  |  105  |  48<H>  ----------------------------<  168<H>  4.8   |  24  |  1.10    Ca    7.9<L>      03 Dec 2018 06:24  Phos  3.2     12  Mg     1.6     12    TPro  4.2<L>  /  Alb  2.1<L>  /  TBili  3.1<H>  /  DBili  1.10<H>  /  AST  38<H>  /  ALT  22  /  AlkPhos  71  1203    LIVER FUNCTIONS - ( 03 Dec 2018 06:24 )  Alb: 2.1 g/dL / Pro: 4.2 g/dL / ALK PHOS: 71 U/L / ALT: 22 U/L / AST: 38 U/L / GGT: x           PT/INR - ( 03 Dec 2018 06:24 )   PT: 18.5 sec;   INR: 1.61 ratio      PTT - ( 03 Dec 2018 06:24 )  PTT:30.6 sec    Lactate, Blood (12.02.18 @ 12:52)    Lactate, Blood: 4.2 mmol/L    Hemoglobin A1C, Whole Blood (12.01.18 @ 10:32)  Hemoglobin A1C, Whole Blood: 8.3: Method: Immunoassay    Urinalysis Basic - ( 2018 18:30 )    Color: Yellow / Appearance: Slightly Turbid / S.015 / pH: x  Gluc: x / Ketone: Trace  / Bili: Small / Urobili: Negative   Blood: x / Protein: 25 mg/dL / Nitrite: Negative   Leuk Esterase: Trace / RBC: 6-10 /HPF / WBC 3-5   Sq Epi: x / Non Sq Epi: Occasional / Bacteria: Occasional      Culture Results:   No growth to date. ( @ 20:55)  Culture Results:   No growth to date. ( @ 20:55)  Culture Results:   <10,000 CFU/ml Normal Urogenital ninfa present ( @ 20:19)      culture blood  -- .Blood Blood  @ 20:55    culture urine  --   @ 20:55  culture blood  -- .Urine Clean Catch (Midstream)  @ 20:19    culture urine  --   @ 20:19      CARDIAC MARKERS ( 02 Dec 2018 08:40 )  x     / x     / 504 U/L / x     / x      CARDIAC MARKERS ( 2018 23:51 )  .019 ng/mL / x     / 301 U/L / x     / 2.8 ng/mL  CARDIAC MARKERS ( 2018 16:25 )  .019 ng/mL / x     / x     / x     / x          Culture - Blood (collected 2018 20:55)  Source: .Blood Blood  Preliminary Report (01 Dec 2018 21:01):    No growth to date.    Culture - Blood (collected 2018 20:55)  Source: .Blood Blood  Preliminary Report (01 Dec 2018 21:01):    No growth to date.    Culture - Urine (collected 2018 20:19)  Source: .Urine Clean Catch (Midstream)  Final Report (01 Dec 2018 15:16):    <10,000 CFU/ml Normal Urogenital ninfa present    Lipase, Serum: 70 U/L (18 @ 16:25)    RADIOLOGY & ADDITIONAL TESTS:  < from: Xray Chest 1 View- PORTABLE-Urgent (18 @ 23:39) >    EXAM:  XR CHEST PORTABLE URGENT 1V                            PROCEDURE DATE:  2018        INTERPRETATION:  Portable chest radiograph       CLINICAL INFORMATION: Line placement. GI bleeding.    TECHNIQUE:  Single portable frontal AP view ofthe chest was obtained.    FINDINGS: The examination of 10/17/2018 is available for review.    The lungs are free of infiltrate.     No pleural abnormality is seen.      The heart and mediastinum appear unchanged. The tip of the right IJ line   is projecting over the superior vena cava    No destructive lesion is seen in the visualized skeleton.    IMPRESSION:   No infiltrate.      < end of copied text >    HEALTH ISSUES - PROBLEM Dx:  GIB (gastrointestinal bleeding): GIB (gastrointestinal bleeding)  Lactic acid blood increased: Lactic acid blood increased  Prophylactic measure: Prophylactic measure  Pedal edema: Pedal edema  Neuropathy: Neuropathy  Hypercholesteremia: Hypercholesteremia  Hypertension: Hypertension  Fatty liver disease, nonalcoholic: Fatty liver disease, nonalcoholic  Hepatic encephalopathy: Hepatic encephalopathy  Diabetes: Diabetes  Near syncope: Near syncope  Vomiting: Vomiting      Consultant(s) Notes Reviewed:  [ x ] YES     Care Discussed with [X] Consultants  [  x] Patient  [  ] Family  [  ]   [  ] Social Service  [ x ] RN, [  ] Physical Therapy  DVT PPX: [  ] Lovenox, [  ] S C Heparin, [  ] Coumadin, [  ] Xarelto, [  ] Eliquis, [  ] Pradaxa, [  ] IV Heparin drip, [ x ] SCD [  ] Contraindication 2 to GI Bleed,[  ] Ambulation  Advanced directive: [x  ] None, [  ] DNR/DNI Patient is a 62y old  Male who presents with a chief complaint of vomiting and near syncope, upper GI bleeding (02 Dec 2018 13:00)      INTERVAL HPI:  Pt is a 63 y/o M with PMHx of Type 2 DM, HTN, HLD, obesity, nephrolithiasis (last stone 25 years ago), neuropathy, HE, NAFLD, Hyper ammonemia, recent Gastritis/ esophagitis on EGD & Gram variable teddy bacteremia in 10/2018  who presents with vomiting and  near syncope/falling out of a moving car. Pt [place] and [place]", his vision was cloudy, he pulled over the car and stopped and vomited. Vomitus was "creamy white." Pt denies seeing blood in vomitus. His dtr then took over driving and Pt again felt like he needed to vomit and told dtr to pull over. He thought the car had stopped and he opened the door and fell out of the moving car. He laid on the ground for ~ 15 minutes. Pt says he possibly lost consciousness. States when he was lying on the ground felt very cold. Pt has had "terrible heartburn" for 2 days and felt chills for 2 days. Pt denies abdominal pain, CP, palp, SOB, cough, myalgias, rash, HA. Pt admits to chills, vomiting (4 episodes), diarrhea (from lactulose), dizziness, pedal edema, tingling in hands (chronic, intermittent). Pt denies recent travel, sick contacts, different eating patterns.    Pt states that early this week pt saw her Endocrinologist & started back on Metformin 500 mg daily as per Dr's recommendation.  In ED Pt's vitals were: T(C): 36.3, HR: 91, BP: 85/59, RR: 15, SpO2: 98% on RA  Labs significant for lactate 5.6, , Hgb 10.4, total bili 3.7, alk phos 183, ammonia 72, Mg 1.4. UA - trace LE, mod blood, RBC 6-10, urine glucose 1,000.   CXR - negative chest  Abd Xray - There is mild content in the colon and no overt sense of bowel obstruction.  CT head - no intracranial hemorrhage or mass effect  CT cervical - Multilevel degenerative disc disease/cervical spondylosis, with posterior osteophyte disc complex C6-7 resulting in flattening of the cervical cord. If there is a clinical concern for spinal cord injury, recommend further evaluation with MRI if there are no clinical contraindications.  EKG - NSR, 93bpm    Pt given 2L NS bolus, Zofran pantoprazole, famotidine, Carafate , Pt seen by GI Dr Morales in ER & was admitted for further Eval.    18: Pt seen, examined, Had total 4 bloody BM so far , Lactate improved, pt had CT A/P with IV Contrast. Drop in H/H. plan for, start Protonix drip,  2 u pRBC 1 u FFP & 5 mg Vit K x 1 as d/w Dr Franks -GI, Elevated ammonia level. Coagulopathy, Improving Lactate. Ac Blood loss Anemia.    18: Pt seen, Examined, pt was transferred to ICU on 18 for symptomatic upper GI Bleed, S/P 7  units pRBC in Total , 3 FFP, 1 platelets transfusion given, Pt had emergent EGD done that found Ac Bleeding Duodenal ulcer found. S/P Epi given. Homeostasis obtained, Pt seen By Sx Dr Hernandez. H/H Stable,  today with MISA, Coagulopathy , Leukocytosis & High Lactate level.  H/H Low stable with Ac Blood  Loss Anemia. Hematology Dr Rios called, as pt is followed by Dr Rios- H/O as out pt.    12/3/18: Patient seen and examined this morning at bedside. Patient denies any acute complaints this morning. Hgb drop from 8.2 --> 7.7. Leukocytosis present at 15.08. Lactate level drop from 3.2 --> 2.5    19: Patient seen and examined this morning. Patient was sitting out of bed in chair, awake, alert, and oriented x3. Patient states he is feeling better since yesterday. Patient had EGD done yesterday that showed large bleeding Duodenal ulcer s/p clips x2, epi injection, and cautery. Patient was given 3 units of packed red blood cells yesterday. Hbg increased from 7.3 --> 7.8. WBC count drop from 12.53 --> 9.47. Patient tolerating ice chips and clear liquid diet. Patient denies fever, chills, headache, weakness chest pain, palpitations, sob, N/V, diarrhea, constipation, blood in stool, urinary symptoms. Patient admits to feeling fatigued. Has not had a BM today.     OVERNIGHT EVENTS: None    Home Medications:  Bacid (LAC) oral tablet: 1 tab(s) orally 2 times a day for  2weeks (2018 19:41)  HumaLOG 100 units/mL subcutaneous solution: 20 unit(s) subcutaneous 2 times a day(breakfast and lunch) (2018 19:41)  HumaLOG 100 units/mL subcutaneous solution: 30 unit(s) subcutaneous once a day(@dinner) (2018 19:41)  irbesartan 300 mg oral tablet: 1 tab(s) orally once a day (2018 19:41)  lactulose 10 g/15 mL oral syrup: 15 milliliter(s) orally 3 times a day (2018 19:41)  Lasix 20 mg oral tablet: 1 tab(s) orally once a day (2018 19:41)  pantoprazole 40 mg oral delayed release tablet: 1 tab(s) orally once a day (2018 19:41)  phytonadione 100 mcg oral tablet: 1 tab(s) orally once a day (2018 19:41)  potassium chloride 10 mEq oral tablet, extended release: 1 tab(s) orally once a day (2018 19:41)  pravastatin 40 mg oral tablet: 1 tab(s) orally once a day (2018 19:41)  Vitamin D3 2000 intl units oral tablet: 1 tab(s) orally once a day (2018 19:41)      MEDICATIONS  (STANDING):  dextrose 5%. 1000 milliLiter(s) (50 mL/Hr) IV Continuous <Continuous>  dextrose 50% Injectable 12.5 Gram(s) IV Push once  dextrose 50% Injectable 25 Gram(s) IV Push once  dextrose 50% Injectable 25 Gram(s) IV Push once  gabapentin 200 milliGRAM(s) Oral four times a day  insulin glargine Injectable (LANTUS) 22 Unit(s) SubCutaneous at bedtime  insulin lispro (HumaLOG) corrective regimen sliding scale   SubCutaneous every 6 hours  pantoprazole Infusion 8 mG/Hr (10 mL/Hr) IV Continuous <Continuous>  rifaximin 550 milliGRAM(s) Oral two times a day  sucralfate 1 Gram(s) Oral every 6 hours  ursodiol Capsule 300 milliGRAM(s) Oral three times a day    MEDICATIONS  (PRN):  dextrose 40% Gel 15 Gram(s) Oral once PRN Blood Glucose LESS THAN 70 milliGRAM(s)/deciliter  glucagon  Injectable 1 milliGRAM(s) IntraMuscular once PRN Glucose LESS THAN 70 milligrams/deciliter      Allergies    codeine (Anaphylaxis)    Intolerances    REVIEW OF SYSTEMS:   CONSTITUTIONAL: No fever, No chills No myalgia, No Body ache, No Weakness. Admits to fatigue.  EYES: No eye pain,  No visual disturbances, No discharge, NO Redness  ENMT:  No ear pain, No nose bleed, No vertigo; No sinus or throat pain, No Congestion  NECK: No pain, No stiffness  RESPIRATORY: No cough, wheezing, No hemoptysis, No shortness of breath  CARDIOVASCULAR: No chest pain, palpitations  GASTROINTESTINAL: No abdominal or epigastric pain. No nausea, No vomiting; No diarrhea or constipation. [ ] BM  GENITOURINARY: No dysuria, No frequency, No urgency, No hematuria, or incontinence  NEUROLOGICAL: No headaches, No dizziness, No numbness, No tingling, No tremors, No weakness  EXT: No Swelling, No Pain, No Edema  SKIN:  [ x ] No itching, burning, new rashes, or lesions   MUSCULOSKELETAL: No joint pain. slight swelling in the R hand (possible due to IV placement on dorsal aspect of hand), non-painful. No muscle pain, No back pain, No extremity pain  PSYCHIATRIC: No depression, anxiety, mood swings or difficulty sleeping at night  PAIN SCALE: [x  ] None  [  ] Other-  ROS Unable to obtain due to - [  ] Dementia  [  ] Lethargy  [  ] Sedated [  ] non verbal  REST OF REVIEW Of SYSTEM - [ x ] Normal     Vital Signs Last 24 Hrs  T(C): 36.9 (04 Dec 2018 08:00), Max: 37.1 (04 Dec 2018 07:58)  T(F): 98.5 (04 Dec 2018 08:00), Max: 98.7 (04 Dec 2018 07:58)  HR: 99 (04 Dec 2018 10:00) (99 - 146)  BP: 110/55 (04 Dec 2018 10:00) (93/52 - 182/75)  BP(mean): 77 (04 Dec 2018 10:00) (68 - 108)  RR: 13 (04 Dec 2018 10:00) (12 - 31)  SpO2: 93% (04 Dec 2018 10:00) (92% - 100%)     @ :  -   @ 07:00  --------------------------------------------------------  IN: 1914 mL / OUT: 1030 mL / NET: 884 mL     @ 07:  -   @ 13:15  --------------------------------------------------------  IN: 50 mL / OUT: 220 mL / NET: -170 mL    PHYSICAL EXAM: Rt IJ TLC   GENERAL:  [x  ] NAD , [ x ] well appearing, [  ] Agitated, [  ] Drowsy,  [  ] Lethargy, [  ] confused   HEAD:  [ x ] Normal, [  ] Other  EYES:  [ x ] EOMI, [x  ] PERRLA, [ x ] conjunctiva and sclera clear normal, [  ] Other,  [x  ] Pallor,[  ] Discharge  ENMT:  [x  ] Normal, [ x ] Moist mucous membranes, [ x ] Good dentition, [ x ] No Thrush  NECK:  [x  ] Supple, [ x ] No JVD, [x  ] Normal thyroid, [  ] Lymphadenopathy [  ] Other  CHEST/LUNG:  [x  ] Clear to auscultation bilaterally, [ x ] Breath Sounds equal B/L,  [x  ] No rales, [x  ] No rhonchi  [ x ]  No wheezing  HEART:  [x  ] irregular rate and regular rhythm, [ x ] tachycardia, [  ] Bradycardia,  [  ] irregular  [ x] No murmurs, No rubs, No gallops, [  ] PPM in place (Mfr:  )  ABDOMEN:  [x  ] Soft, [ x ] Nontender, [x  ] Nondistended, [x  ] No mass, [x  ] Bowel sounds present, [ x ] obese  NERVOUS SYSTEM:  [x  ] Alert & Oriented X3, [ x ] Nonfocal  [  ] Confusion  [  ] Encephalopathic [  ] Sedated [  ] Unable to assess, [  ] Other-  EXTREMITIES: [ x ] 2+ Peripheral Pulses, No clubbing, No cyanosis,  [  ] edema B/L lower EXT. [  ] PVD stasis skin changes B/L Lower EXT  LYMPH: No lymphadenopathy noted  SKIN:  [ x ] No new rashes or lesions, [  ] Pressure Ulcers, [  ] ecchymosis, [  ] Skin Tears, [  ] Other    DIET: NPO    LABS:                        7.8    9.47  )-----------( 98       ( 04 Dec 2018 06:12 )             22.5     12-    140  |  105  |  40<H>  ----------------------------<  149<H>  3.9   |  28  |  1.00    Ca    7.3<L>      04 Dec 2018 06:12  Phos  1.9     -  Mg     1.7     12-    TPro  4.4<L>  /  Alb  2.2<L>  /  TBili  3.6<H>  /  DBili  x   /  AST  33  /  ALT  21  /  AlkPhos  73  12-04    LIVER FUNCTIONS - ( 04 Dec 2018 06:12 )  Alb: 2.2 g/dL / Pro: 4.4 g/dL / ALK PHOS: 73 U/L / ALT: 21 U/L / AST: 33 U/L / GGT: x           PT/INR - ( 04 Dec 2018 06:12 )   PT: 18.6 sec;   INR: 1.61 ratio         PTT - ( 04 Dec 2018 06:12 )  PTT:27.7 sec    Lactate, Blood (12.02.18 @ 12:52)    Lactate, Blood: 4.2 mmol/L    Hemoglobin A1C, Whole Blood (12.01.18 @ 10:32)  Hemoglobin A1C, Whole Blood: 8.3: Method: Immunoassay    Urinalysis Basic - ( 2018 18:30 )    Color: Yellow / Appearance: Slightly Turbid / S.015 / pH: x  Gluc: x / Ketone: Trace  / Bili: Small / Urobili: Negative   Blood: x / Protein: 25 mg/dL / Nitrite: Negative   Leuk Esterase: Trace / RBC: 6-10 /HPF / WBC 3-5   Sq Epi: x / Non Sq Epi: Occasional / Bacteria: Occasional      Culture Results:   No growth to date. (11-30 @ 20:55)  Culture Results:   No growth to date. ( @ 20:55)  Culture Results:   <10,000 CFU/ml Normal Urogenital ninfa present ( @ 20:19)    culture blood  -- .Blood Blood  @ 20:55    culture urine  --   @ 20:55  culture blood  -- .Urine Clean Catch (Midstream)  @ 20:19    culture urine  --   @ 20:19    CARDIAC MARKERS ( 02 Dec 2018 08:40 )  x     / x     / 504 U/L / x     / x      CARDIAC MARKERS ( 2018 23:51 )  .019 ng/mL / x     / 301 U/L / x     / 2.8 ng/mL  CARDIAC MARKERS ( 2018 16:25 )  .019 ng/mL / x     / x     / x     / x          Culture - Blood (collected 2018 20:55)  Source: .Blood Blood  Preliminary Report (01 Dec 2018 21:01):    No growth to date.    Culture - Blood (collected 2018 20:55)  Source: .Blood Blood  Preliminary Report (01 Dec 2018 21:01):    No growth to date.    Culture - Urine (collected 2018 20:19)  Source: .Urine Clean Catch (Midstream)  Final Report (01 Dec 2018 15:16):    <10,000 CFU/ml Normal Urogenital ninfa present    Lipase, Serum: 70 U/L (18 @ 16:25)    RADIOLOGY & ADDITIONAL TESTS:  < from: Xray Chest 1 View- PORTABLE-Urgent (18 @ 23:39) >    EXAM:  XR CHEST PORTABLE URGENT 1V                            PROCEDURE DATE:  2018        INTERPRETATION:  Portable chest radiograph       CLINICAL INFORMATION: Line placement. GI bleeding.    TECHNIQUE:  Single portable frontal AP view ofthe chest was obtained.    FINDINGS: The examination of 10/17/2018 is available for review.    The lungs are free of infiltrate.     No pleural abnormality is seen.      The heart and mediastinum appear unchanged. The tip of the right IJ line   is projecting over the superior vena cava    No destructive lesion is seen in the visualized skeleton.    IMPRESSION:   No infiltrate.      < end of copied text >    HEALTH ISSUES - PROBLEM Dx:  GIB (gastrointestinal bleeding): GIB (gastrointestinal bleeding)  Lactic acid blood increased: Lactic acid blood increased  Prophylactic measure: Prophylactic measure  Pedal edema: Pedal edema  Neuropathy: Neuropathy  Hypercholesteremia: Hypercholesteremia  Hypertension: Hypertension  Fatty liver disease, nonalcoholic: Fatty liver disease, nonalcoholic  Hepatic encephalopathy: Hepatic encephalopathy  Diabetes: Diabetes  Near syncope: Near syncope  Vomiting: Vomiting      Consultant(s) Notes Reviewed:  [ x ] YES     Care Discussed with [X] Consultants  [  x] Patient  [  ] Family  [  ]   [  ] Social Service  [ x ] RN, [  ] Physical Therapy  DVT PPX: [  ] Lovenox, [  ] S C Heparin, [  ] Coumadin, [  ] Xarelto, [  ] Eliquis, [  ] Pradaxa, [  ] IV Heparin drip, [ x ] SCD [  ] Contraindication 2 to GI Bleed,[  ] Ambulation  Advanced directive: [x  ] None, [  ] DNR/DNI Patient is a 62y old  Male who presents with a chief complaint of vomiting and near syncope, upper GI bleeding (02 Dec 2018 13:00)      INTERVAL HPI:  Pt is a 63 y/o M with PMHx of Type 2 DM, HTN, HLD, obesity, nephrolithiasis (last stone 25 years ago), neuropathy, HE, NAFLD, Hyper ammonemia, recent Gastritis/ esophagitis on EGD & Gram variable teddy bacteremia in 10/2018  who presents with vomiting and  near syncope/falling out of a moving car. Pt [place] and [place]", his vision was cloudy, he pulled over the car and stopped and vomited. Vomitus was "creamy white." Pt denies seeing blood in vomitus. His dtr then took over driving and Pt again felt like he needed to vomit and told dtr to pull over. He thought the car had stopped and he opened the door and fell out of the moving car. He laid on the ground for ~ 15 minutes. Pt says he possibly lost consciousness. States when he was lying on the ground felt very cold. Pt has had "terrible heartburn" for 2 days and felt chills for 2 days. Pt denies abdominal pain, CP, palp, SOB, cough, myalgias, rash, HA. Pt admits to chills, vomiting (4 episodes), diarrhea (from lactulose), dizziness, pedal edema, tingling in hands (chronic, intermittent). Pt denies recent travel, sick contacts, different eating patterns.    Pt states that early this week pt saw her Endocrinologist & started back on Metformin 500 mg daily as per Dr's recommendation.  In ED Pt's vitals were: T(C): 36.3, HR: 91, BP: 85/59, RR: 15, SpO2: 98% on RA  Labs significant for lactate 5.6, , Hgb 10.4, total bili 3.7, alk phos 183, ammonia 72, Mg 1.4. UA - trace LE, mod blood, RBC 6-10, urine glucose 1,000.   CXR - negative chest  Abd Xray - There is mild content in the colon and no overt sense of bowel obstruction.  CT head - no intracranial hemorrhage or mass effect  CT cervical - Multilevel degenerative disc disease/cervical spondylosis, with posterior osteophyte disc complex C6-7 resulting in flattening of the cervical cord. If there is a clinical concern for spinal cord injury, recommend further evaluation with MRI if there are no clinical contraindications.  EKG - NSR, 93bpm    Pt given 2L NS bolus, Zofran pantoprazole, famotidine, Carafate , Pt seen by GI Dr Morales in ER & was admitted for further Eval.    18: Pt seen, examined, Had total 4 bloody BM so far , Lactate improved, pt had CT A/P with IV Contrast. Drop in H/H. plan for, start Protonix drip,  2 u pRBC 1 u FFP & 5 mg Vit K x 1 as d/w Dr Franks -GI, Elevated ammonia level. Coagulopathy, Improving Lactate. Ac Blood loss Anemia.    18: Pt seen, Examined, pt was transferred to ICU on 18 for symptomatic upper GI Bleed, S/P 7  units pRBC in Total , 3 FFP, 1 platelets transfusion given, Pt had emergent EGD done that found Ac Bleeding Duodenal ulcer found. S/P Epi given. Homeostasis obtained, Pt seen By Sx Dr Hernandez. H/H Stable,  today with MISA, Coagulopathy , Leukocytosis & High Lactate level.  H/H Low stable with Ac Blood  Loss Anemia. Hematology Dr Rios called, as pt is followed by Dr Rios- H/O as out pt.    12/3/18: Patient seen and examined this morning at bedside. Patient denies any acute complaints this morning. Hgb drop from 8.2 --> 7.7. Leukocytosis present at 15.08. Lactate level drop from 3.2 --> 2.5    19: Patient seen and examined this morning. Patient was sitting out of bed in chair, awake, alert, and oriented x3. Hb dropped to 6.6.  Patient states he is feeling better since yesterday. Patient had EGD done yesterday that showed large bleeding Duodenal ulcer s/p clips x2, epi injection, and cautery. Patient was given 3 units of packed red blood cells yesterday. Hbg increased from 7.3 --> 7.8. WBC count drop from 12.53 --> 9.47. Patient tolerating ice chips and clear liquid diet. Patient denies fever, chills, headache, weakness chest pain, palpitations, sob, N/V, diarrhea, constipation, blood in stool, urinary symptoms. Patient admits to feeling fatigued. Has not had a BM today.     OVERNIGHT EVENTS: NONE    Home Medications:  Bacid (LAC) oral tablet: 1 tab(s) orally 2 times a day for  2weeks (2018 19:41)  HumaLOG 100 units/mL subcutaneous solution: 20 unit(s) subcutaneous 2 times a day(breakfast and lunch) (2018 19:41)  HumaLOG 100 units/mL subcutaneous solution: 30 unit(s) subcutaneous once a day(@dinner) (2018 19:41)  irbesartan 300 mg oral tablet: 1 tab(s) orally once a day (2018 19:41)  lactulose 10 g/15 mL oral syrup: 15 milliliter(s) orally 3 times a day (2018 19:41)  Lasix 20 mg oral tablet: 1 tab(s) orally once a day (2018 19:41)  pantoprazole 40 mg oral delayed release tablet: 1 tab(s) orally once a day (2018 19:41)  phytonadione 100 mcg oral tablet: 1 tab(s) orally once a day (2018 19:41)  potassium chloride 10 mEq oral tablet, extended release: 1 tab(s) orally once a day (2018 19:41)  pravastatin 40 mg oral tablet: 1 tab(s) orally once a day (2018 19:41)  Vitamin D3 2000 intl units oral tablet: 1 tab(s) orally once a day (2018 19:41)      MEDICATIONS  (STANDING):  dextrose 5%. 1000 milliLiter(s) (50 mL/Hr) IV Continuous <Continuous>  dextrose 50% Injectable 12.5 Gram(s) IV Push once  dextrose 50% Injectable 25 Gram(s) IV Push once  dextrose 50% Injectable 25 Gram(s) IV Push once  gabapentin 200 milliGRAM(s) Oral four times a day  insulin glargine Injectable (LANTUS) 22 Unit(s) SubCutaneous at bedtime  insulin lispro (HumaLOG) corrective regimen sliding scale   SubCutaneous every 6 hours  pantoprazole Infusion 8 mG/Hr (10 mL/Hr) IV Continuous <Continuous>  rifaximin 550 milliGRAM(s) Oral two times a day  sucralfate 1 Gram(s) Oral every 6 hours  ursodiol Capsule 300 milliGRAM(s) Oral three times a day    MEDICATIONS  (PRN):  dextrose 40% Gel 15 Gram(s) Oral once PRN Blood Glucose LESS THAN 70 milliGRAM(s)/deciliter  glucagon  Injectable 1 milliGRAM(s) IntraMuscular once PRN Glucose LESS THAN 70 milligrams/deciliter      Allergies    codeine (Anaphylaxis)    Intolerances    REVIEW OF SYSTEMS:   CONSTITUTIONAL: No fever, No chills No myalgia, No Body ache, No Weakness. Admits to fatigue.  EYES: No eye pain,  No visual disturbances, No discharge, NO Redness  ENMT:  No ear pain, No nose bleed, No vertigo; No sinus or throat pain, No Congestion  NECK: No pain, No stiffness  RESPIRATORY: No cough, wheezing, No hemoptysis, No shortness of breath  CARDIOVASCULAR: No chest pain, palpitations  GASTROINTESTINAL: No abdominal or epigastric pain. No nausea, No vomiting; No diarrhea or constipation. [  ] BM  GENITOURINARY: No dysuria, No frequency, No urgency, No hematuria, or incontinence  NEUROLOGICAL: No headaches, No dizziness, No numbness, No tingling, No tremors, No weakness  EXT: No Swelling, No Pain, No Edema  SKIN:  [ x ] No itching, burning, new rashes, or lesions   MUSCULOSKELETAL: No joint pain. slight swelling in the R hand (possible due to IV placement on dorsal aspect of hand), non-painful. No muscle pain, No back pain, No extremity pain  PSYCHIATRIC: No depression, anxiety, mood swings or difficulty sleeping at night  PAIN SCALE: [x  ] None  [  ] Other-  ROS Unable to obtain due to - [  ] Dementia  [  ] Lethargy  [  ] Sedated [  ] non verbal  REST OF REVIEW Of SYSTEM - [ x ] Normal     Vital Signs Last 24 Hrs  T(C): 36.9 (04 Dec 2018 08:00), Max: 37.1 (04 Dec 2018 07:58)  T(F): 98.5 (04 Dec 2018 08:00), Max: 98.7 (04 Dec 2018 07:58)  HR: 99 (04 Dec 2018 10:00) (99 - 146)  BP: 110/55 (04 Dec 2018 10:00) (93/52 - 182/75)  BP(mean): 77 (04 Dec 2018 10:00) (68 - 108)  RR: 13 (04 Dec 2018 10:00) (12 - 31)  SpO2: 93% (04 Dec 2018 10:00) (92% - 100%)     @ :  -   @ 07:00  --------------------------------------------------------  IN: 1914 mL / OUT: 1030 mL / NET: 884 mL     @ 07:  -   @ 13:15  --------------------------------------------------------  IN: 50 mL / OUT: 220 mL / NET: -170 mL    PHYSICAL EXAM: Rt IJ TLC , Beasley cath  GENERAL:  [x  ] NAD , [ x ] well appearing, [  ] Agitated, [  ] Drowsy,  [  ] Lethargy, [  ] confused   HEAD:  [ x ] Normal, [  ] Other  EYES:  [ x ] EOMI, [x  ] PERRLA, [ x ] conjunctiva and sclera clear normal, [  ] Other,  [x  ] Pallor,[  ] Discharge  ENMT:  [x  ] Normal, [ x ] Moist mucous membranes, [ x ] Good dentition, [ x ] No Thrush  NECK:  [x  ] Supple, [ x ] No JVD, [x  ] Normal thyroid, [  ] Lymphadenopathy [  ] Other  CHEST/LUNG:  [x  ] Clear to auscultation bilaterally, [ x ] Breath Sounds equal B/L,  [x  ] No rales, [x  ] No rhonchi  [ x ]  No wheezing  HEART:  [x  ] irregular rate and regular rhythm, [ x ] tachycardia, [  ] Bradycardia,  [  ] irregular  [ x] No murmurs, No rubs, No gallops, [  ] PPM in place (Mfr:  )  ABDOMEN:  [x  ] Soft, [ x ] Nontender, [x  ] Nondistended, [x  ] No mass, [x  ] Bowel sounds present, [ x ] obese  NERVOUS SYSTEM:  [x  ] Alert & Oriented X3, [ x ] Nonfocal  [  ] Confusion  [  ] Encephalopathic [  ] Sedated [  ] Unable to assess, [  ] Other-  EXTREMITIES: [ x ] 2+ Peripheral Pulses, No clubbing, No cyanosis,  [  ] edema B/L lower EXT. [  ] PVD stasis skin changes B/L Lower EXT  LYMPH: No lymphadenopathy noted  SKIN:  [ x ] No new rashes or lesions, [  ] Pressure Ulcers, [  ] ecchymosis, [  ] Skin Tears, [  ] Other    DIET: NPO    LABS:                        7.8    9.47  )-----------( 98       ( 04 Dec 2018 06:12 )             22.5     12-04    140  |  105  |  40<H>  ----------------------------<  149<H>  3.9   |  28  |  1.00    Ca    7.3<L>      04 Dec 2018 06:12  Phos  1.9     12-04  Mg     1.7     12-04    TPro  4.4<L>  /  Alb  2.2<L>  /  TBili  3.6<H>  /  DBili  x   /  AST  33  /  ALT  21  /  AlkPhos  73  12-04    LIVER FUNCTIONS - ( 04 Dec 2018 06:12 )  Alb: 2.2 g/dL / Pro: 4.4 g/dL / ALK PHOS: 73 U/L / ALT: 21 U/L / AST: 33 U/L / GGT: x           PT/INR - ( 04 Dec 2018 06:12 )   PT: 18.6 sec;   INR: 1.61 ratio         PTT - ( 04 Dec 2018 06:12 )  PTT:27.7 sec    Lactate, Blood (12.02.18 @ 12:52)    Lactate, Blood: 4.2 mmol/L    Hemoglobin A1C, Whole Blood (12.01.18 @ 10:32)  Hemoglobin A1C, Whole Blood: 8.3: Method: Immunoassay    Urinalysis Basic - ( 2018 18:30 )    Color: Yellow / Appearance: Slightly Turbid / S.015 / pH: x  Gluc: x / Ketone: Trace  / Bili: Small / Urobili: Negative   Blood: x / Protein: 25 mg/dL / Nitrite: Negative   Leuk Esterase: Trace / RBC: 6-10 /HPF / WBC 3-5   Sq Epi: x / Non Sq Epi: Occasional / Bacteria: Occasional      Culture Results:   No growth to date. ( @ 20:55)  Culture Results:   No growth to date. ( @ 20:55)  Culture Results:   <10,000 CFU/ml Normal Urogenital ninfa present ( @ 20:19)    culture blood  -- .Blood Blood  @ 20:55    culture urine  --   @ 20:55  culture blood  -- .Urine Clean Catch (Midstream)  @ 20:19    culture urine  --   @ 20:19    CARDIAC MARKERS ( 02 Dec 2018 08:40 )  x     / x     / 504 U/L / x     / x      CARDIAC MARKERS ( 2018 23:51 )  .019 ng/mL / x     / 301 U/L / x     / 2.8 ng/mL  CARDIAC MARKERS ( 2018 16:25 )  .019 ng/mL / x     / x     / x     / x          Culture - Blood (collected 2018 20:55)  Source: .Blood Blood  Preliminary Report (01 Dec 2018 21:01):    No growth to date.    Culture - Blood (collected 2018 20:55)  Source: .Blood Blood  Preliminary Report (01 Dec 2018 21:01):    No growth to date.    Culture - Urine (collected 2018 20:19)  Source: .Urine Clean Catch (Midstream)  Final Report (01 Dec 2018 15:16):    <10,000 CFU/ml Normal Urogenital ninfa present    Lipase, Serum: 70 U/L (18 @ 16:25)    RADIOLOGY & ADDITIONAL TESTS:  < from: Xray Chest 1 View- PORTABLE-Urgent (18 @ 23:39) >    EXAM:  XR CHEST PORTABLE URGENT 1V                            PROCEDURE DATE:  2018        INTERPRETATION:  Portable chest radiograph       CLINICAL INFORMATION: Line placement. GI bleeding.    TECHNIQUE:  Single portable frontal AP view ofthe chest was obtained.    FINDINGS: The examination of 10/17/2018 is available for review.    The lungs are free of infiltrate.     No pleural abnormality is seen.      The heart and mediastinum appear unchanged. The tip of the right IJ line   is projecting over the superior vena cava    No destructive lesion is seen in the visualized skeleton.    IMPRESSION:   No infiltrate.      < end of copied text >    HEALTH ISSUES - PROBLEM Dx:  GIB (gastrointestinal bleeding): GIB (gastrointestinal bleeding)  Lactic acid blood increased: Lactic acid blood increased  Prophylactic measure: Prophylactic measure  Pedal edema: Pedal edema  Neuropathy: Neuropathy  Hypercholesteremia: Hypercholesteremia  Hypertension: Hypertension  Fatty liver disease, nonalcoholic: Fatty liver disease, nonalcoholic  Hepatic encephalopathy: Hepatic encephalopathy  Diabetes: Diabetes  Near syncope: Near syncope  Vomiting: Vomiting      Consultant(s) Notes Reviewed:  [ x ] YES     Care Discussed with [X] Consultants  [  x] Patient  [  ] Family  [  ]   [  ] Social Service  [ x ] RN, [  ] Physical Therapy  DVT PPX: [  ] Lovenox, [  ] S C Heparin, [  ] Coumadin, [  ] Xarelto, [  ] Eliquis, [  ] Pradaxa, [  ] IV Heparin drip, [ x ] SCD [  ] Contraindication 2 to GI Bleed,[  ] Ambulation  Advanced directive: [x  ] None, [  ] DNR/DNI Patient is a 62y old  Male who presents with a chief complaint of vomiting and near syncope, upper GI bleeding (02 Dec 2018 13:00)      INTERVAL HPI:  Pt is a 63 y/o M with PMHx of Type 2 DM, HTN, HLD, obesity, nephrolithiasis (last stone 25 years ago), neuropathy, HE, NAFLD, Hyper ammonemia, recent Gastritis/ esophagitis on EGD & Gram variable teddy bacteremia in 10/2018  who presents with vomiting and  near syncope/falling out of a moving car. Pt states that [place] and felt "off", his vision was cloudy, he pulled over the car and stopped and vomited. Vomitus was "creamy white." Pt denies seeing blood in vomitus. His dtr then took over driving and Pt again felt like he needed to vomit and told dtr to pull over. He thought the car had stopped and he opened the door and fell out of the moving car. He laid on the ground for ~ 15 minutes. Pt says he possibly lost consciousness. States when he was lying on the ground felt very cold. Pt has had "terrible heartburn" for 2 days and felt chills for 2 days. Pt denies abdominal pain, CP, palp, SOB, cough, myalgias, rash, HA. Pt admits to chills, vomiting (4 episodes), diarrhea (from lactulose), dizziness, pedal edema, tingling in hands (chronic, intermittent). Pt denies recent travel, sick contacts, different eating patterns.    Pt states that early this week pt saw her Endocrinologist & started back on Metformin 500 mg daily as per Dr's recommendation.  In ED Pt's vitals were: T(C): 36.3, HR: 91, BP: 85/59, RR: 15, SpO2: 98% on RA  Labs significant for lactate 5.6, , Hgb 10.4, total bili 3.7, alk phos 183, ammonia 72, Mg 1.4. UA - trace LE, mod blood, RBC 6-10, urine glucose 1,000.   CXR - negative chest  Abd Xray - There is mild content in the colon and no overt sense of bowel obstruction.  CT head - no intracranial hemorrhage or mass effect  CT cervical - Multilevel degenerative disc disease/cervical spondylosis, with posterior osteophyte disc complex C6-7 resulting in flattening of the cervical cord. If there is a clinical concern for spinal cord injury, recommend further evaluation with MRI if there are no clinical contraindications.  EKG - NSR, 93bpm    Pt given 2L NS bolus, Zofran pantoprazole, famotidine, Carafate , Pt seen by GI Dr Morales in ER & was admitted for further Eval.    18: Pt seen, examined, Had total 4 bloody BM so far , Lactate improved, pt had CT A/P with IV Contrast. Drop in H/H. plan for, start Protonix drip,  2 u pRBC 1 u FFP & 5 mg Vit K x 1 as d/w Dr Franks -GI, Elevated ammonia level. Coagulopathy, Improving Lactate. Ac Blood loss Anemia.    18: Pt seen, Examined, pt was transferred to ICU on 18 for symptomatic upper GI Bleed, S/P 7  units pRBC in Total , 3 FFP, 1 platelets transfusion given, Pt had emergent EGD done that found Ac Bleeding Duodenal ulcer found. S/P Epi given. Homeostasis obtained, Pt seen By Sx Dr Hernandez. H/H Stable,  today with MISA, Coagulopathy , Leukocytosis & High Lactate level.  H/H Low stable with Ac Blood  Loss Anemia. Hematology Dr Rios called, as pt is followed by Dr Rios- H/O as out pt.    12/3/18: Patient seen and examined this morning at bedside. Patient denies any acute complaints this morning. Hgb drop from 8.2 --> 7.7. Leukocytosis present at 15.08. Lactate level drop from 3.2 --> 2.5    19: Patient seen and examined this morning. Patient was sitting out of bed in chair, awake, alert, and oriented x3. Hb dropped to 6.6.  Patient states he is feeling better since yesterday. Patient had EGD done yesterday that showed large bleeding Duodenal ulcer s/p clips x2, epi injection, and cautery. Patient was given 3 units of packed red blood cells yesterday. Hbg increased from 7.3 --> 7.8. WBC count drop from 12.53 --> 9.47. Patient tolerating ice chips and clear liquid diet. Patient denies fever, chills, headache, weakness chest pain, palpitations, sob, N/V, diarrhea, constipation, blood in stool, urinary symptoms. Patient admits to feeling fatigued. Has not had a BM today.     OVERNIGHT EVENTS: NONE    Home Medications:  Bacid (LAC) oral tablet: 1 tab(s) orally 2 times a day for  2weeks (2018 19:41)  HumaLOG 100 units/mL subcutaneous solution: 20 unit(s) subcutaneous 2 times a day(breakfast and lunch) (2018 19:41)  HumaLOG 100 units/mL subcutaneous solution: 30 unit(s) subcutaneous once a day(@dinner) (2018 19:41)  irbesartan 300 mg oral tablet: 1 tab(s) orally once a day (2018 19:41)  lactulose 10 g/15 mL oral syrup: 15 milliliter(s) orally 3 times a day (2018 19:41)  Lasix 20 mg oral tablet: 1 tab(s) orally once a day (2018 19:41)  pantoprazole 40 mg oral delayed release tablet: 1 tab(s) orally once a day (2018 19:41)  phytonadione 100 mcg oral tablet: 1 tab(s) orally once a day (2018 19:41)  potassium chloride 10 mEq oral tablet, extended release: 1 tab(s) orally once a day (2018 19:41)  pravastatin 40 mg oral tablet: 1 tab(s) orally once a day (2018 19:41)  Vitamin D3 2000 intl units oral tablet: 1 tab(s) orally once a day (2018 19:41)      MEDICATIONS  (STANDING):  dextrose 5%. 1000 milliLiter(s) (50 mL/Hr) IV Continuous <Continuous>  dextrose 50% Injectable 12.5 Gram(s) IV Push once  dextrose 50% Injectable 25 Gram(s) IV Push once  dextrose 50% Injectable 25 Gram(s) IV Push once  gabapentin 200 milliGRAM(s) Oral four times a day  insulin glargine Injectable (LANTUS) 50 Unit(s) SubCutaneous at bedtime  insulin lispro (HumaLOG) corrective regimen sliding scale   SubCutaneous every 6 hours  pantoprazole Infusion 8 mG/Hr (10 mL/Hr) IV Continuous <Continuous>  rifaximin 550 milliGRAM(s) Oral two times a day  sucralfate 1 Gram(s) Oral every 6 hours  ursodiol Capsule 300 milliGRAM(s) Oral three times a day    MEDICATIONS  (PRN):  dextrose 40% Gel 15 Gram(s) Oral once PRN Blood Glucose LESS THAN 70 milliGRAM(s)/deciliter  glucagon  Injectable 1 milliGRAM(s) IntraMuscular once PRN Glucose LESS THAN 70 milligrams/deciliter      Allergies    codeine (Anaphylaxis)    Intolerances    REVIEW OF SYSTEMS:   CONSTITUTIONAL: No fever, No chills No myalgia, No Body ache, No Weakness. Admits to fatigue.  EYES: No eye pain,  No visual disturbances, No discharge, NO Redness  ENMT:  No ear pain, No nose bleed, No vertigo; No sinus or throat pain, No Congestion  NECK: No pain, No stiffness  RESPIRATORY: No cough, wheezing, No hemoptysis, No shortness of breath  CARDIOVASCULAR: No chest pain, palpitations  GASTROINTESTINAL: No abdominal or epigastric pain. No nausea, No vomiting; No diarrhea or constipation. [  ] BM  GENITOURINARY: No dysuria, No frequency, No urgency, No hematuria, or incontinence  NEUROLOGICAL: No headaches, No dizziness, No numbness, No tingling, No tremors, No weakness  EXT: No Swelling, No Pain, No Edema  SKIN:  [ x ] No itching, burning, new rashes, or lesions   MUSCULOSKELETAL: No joint pain. slight swelling in the R hand (possible due to IV placement on dorsal aspect of hand), non-painful. No muscle pain, No back pain, No extremity pain  PSYCHIATRIC: No depression, anxiety, mood swings or difficulty sleeping at night  PAIN SCALE: [x  ] None  [  ] Other-  ROS Unable to obtain due to - [  ] Dementia  [  ] Lethargy  [  ] Sedated [  ] non verbal  REST OF REVIEW Of SYSTEM - [ x ] Normal     Vital Signs Last 24 Hrs  T(C): 36.9 (04 Dec 2018 08:00), Max: 37.1 (04 Dec 2018 07:58)  T(F): 98.5 (04 Dec 2018 08:00), Max: 98.7 (04 Dec 2018 07:58)  HR: 99 (04 Dec 2018 10:00) (99 - 146)  BP: 110/55 (04 Dec 2018 10:00) (93/52 - 182/75)  BP(mean): 77 (04 Dec 2018 10:00) (68 - 108)  RR: 13 (04 Dec 2018 10:00) (12 - 31)  SpO2: 93% (04 Dec 2018 10:00) (92% - 100%)     @ :  -   @ 07:00  --------------------------------------------------------  IN: 1914 mL / OUT: 1030 mL / NET: 884 mL     @ 07:  -   @ 13:15  --------------------------------------------------------  IN: 50 mL / OUT: 220 mL / NET: -170 mL    PHYSICAL EXAM: Rt IJ TLC , Beasley cath  GENERAL:  [x  ] NAD , [ x ] well appearing, [  ] Agitated, [  ] Drowsy,  [  ] Lethargy, [  ] confused   HEAD:  [ x ] Normal, [  ] Other  EYES:  [ x ] EOMI, [x  ] PERRLA, [ x ] conjunctiva and sclera clear normal, [  ] Other,  [x  ] Pallor,[  ] Discharge  ENMT:  [x  ] Normal, [ x ] Moist mucous membranes, [ x ] Good dentition, [ x ] No Thrush  NECK:  [x  ] Supple, [ x ] No JVD, [x  ] Normal thyroid, [  ] Lymphadenopathy [  ] Other  CHEST/LUNG:  [x  ] Clear to auscultation bilaterally, [ x ] Breath Sounds equal B/L,  [x  ] No rales, [x  ] No rhonchi  [ x ]  No wheezing  HEART:  [x  ] irregular rate and regular rhythm, [ x ] tachycardia, [  ] Bradycardia,  [  ] irregular  [ x] No murmurs, No rubs, No gallops, [  ] PPM in place (Mfr:  )  ABDOMEN:  [x  ] Soft, [ x ] Nontender, [x  ] Nondistended, [x  ] No mass, [x  ] Bowel sounds present, [ x ] obese  NERVOUS SYSTEM:  [x  ] Alert & Oriented X3, [ x ] Nonfocal  [  ] Confusion  [  ] Encephalopathic [  ] Sedated [  ] Unable to assess, [  ] Other-  EXTREMITIES: [ x ] 2+ Peripheral Pulses, No clubbing, No cyanosis,  [  ] edema B/L lower EXT. [  ] PVD stasis skin changes B/L Lower EXT  LYMPH: No lymphadenopathy noted  SKIN:  [ x ] No new rashes or lesions, [  ] Pressure Ulcers, [  ] ecchymosis, [  ] Skin Tears, [  ] Other    DIET: NPO    LABS:                        7.8    9.47  )-----------( 98       ( 04 Dec 2018 06:12 )             22.5     12-04    140  |  105  |  40<H>  ----------------------------<  149<H>  3.9   |  28  |  1.00    Ca    7.3<L>      04 Dec 2018 06:12  Phos  1.9     12-04  Mg     1.7     12-04    TPro  4.4<L>  /  Alb  2.2<L>  /  TBili  3.6<H>  /  DBili  x   /  AST  33  /  ALT  21  /  AlkPhos  73  12-04    LIVER FUNCTIONS - ( 04 Dec 2018 06:12 )  Alb: 2.2 g/dL / Pro: 4.4 g/dL / ALK PHOS: 73 U/L / ALT: 21 U/L / AST: 33 U/L / GGT: x           PT/INR - ( 04 Dec 2018 06:12 )   PT: 18.6 sec;   INR: 1.61 ratio         PTT - ( 04 Dec 2018 06:12 )  PTT:27.7 sec    Lactate, Blood (12.02.18 @ 12:52)    Lactate, Blood: 4.2 mmol/L    Hemoglobin A1C, Whole Blood (12.01.18 @ 10:32)  Hemoglobin A1C, Whole Blood: 8.3: Method: Immunoassay    Urinalysis Basic - ( 2018 18:30 )    Color: Yellow / Appearance: Slightly Turbid / S.015 / pH: x  Gluc: x / Ketone: Trace  / Bili: Small / Urobili: Negative   Blood: x / Protein: 25 mg/dL / Nitrite: Negative   Leuk Esterase: Trace / RBC: 6-10 /HPF / WBC 3-5   Sq Epi: x / Non Sq Epi: Occasional / Bacteria: Occasional      Culture Results:   No growth to date. ( @ 20:55)  Culture Results:   No growth to date. ( @ 20:55)  Culture Results:   <10,000 CFU/ml Normal Urogenital ninfa present ( @ 20:19)    culture blood  -- .Blood Blood  @ 20:55    culture urine  --   @ 20:55  culture blood  -- .Urine Clean Catch (Midstream)  @ 20:19    culture urine  --   @ 20:19    CARDIAC MARKERS ( 02 Dec 2018 08:40 )  x     / x     / 504 U/L / x     / x      CARDIAC MARKERS ( 2018 23:51 )  .019 ng/mL / x     / 301 U/L / x     / 2.8 ng/mL  CARDIAC MARKERS ( 2018 16:25 )  .019 ng/mL / x     / x     / x     / x          Culture - Blood (collected 2018 20:55)  Source: .Blood Blood  Preliminary Report (01 Dec 2018 21:01):    No growth to date.    Culture - Blood (collected 2018 20:55)  Source: .Blood Blood  Preliminary Report (01 Dec 2018 21:01):    No growth to date.    Culture - Urine (collected 2018 20:19)  Source: .Urine Clean Catch (Midstream)  Final Report (01 Dec 2018 15:16):    <10,000 CFU/ml Normal Urogenital ninfa present    Lipase, Serum: 70 U/L (18 @ 16:25)    RADIOLOGY & ADDITIONAL TESTS:  < from: Xray Chest 1 View- PORTABLE-Urgent (18 @ 23:39) >    EXAM:  XR CHEST PORTABLE URGENT 1V                            PROCEDURE DATE:  2018        INTERPRETATION:  Portable chest radiograph       CLINICAL INFORMATION: Line placement. GI bleeding.    TECHNIQUE:  Single portable frontal AP view ofthe chest was obtained.    FINDINGS: The examination of 10/17/2018 is available for review.    The lungs are free of infiltrate.     No pleural abnormality is seen.      The heart and mediastinum appear unchanged. The tip of the right IJ line   is projecting over the superior vena cava    No destructive lesion is seen in the visualized skeleton.    IMPRESSION:   No infiltrate.      < end of copied text >    HEALTH ISSUES - PROBLEM Dx:  GIB (gastrointestinal bleeding): GIB (gastrointestinal bleeding)  Lactic acid blood increased: Lactic acid blood increased  Prophylactic measure: Prophylactic measure  Pedal edema: Pedal edema  Neuropathy: Neuropathy  Hypercholesteremia: Hypercholesteremia  Hypertension: Hypertension  Fatty liver disease, nonalcoholic: Fatty liver disease, nonalcoholic  Hepatic encephalopathy: Hepatic encephalopathy  Diabetes: Diabetes  Near syncope: Near syncope  Vomiting: Vomiting      Consultant(s) Notes Reviewed:  [ x ] YES     Care Discussed with [X] Consultants  [  x] Patient  [  ] Family  [  ]   [  ] Social Service  [ x ] RN, [  ] Physical Therapy  DVT PPX: [  ] Lovenox, [  ] S C Heparin, [  ] Coumadin, [  ] Xarelto, [  ] Eliquis, [  ] Pradaxa, [  ] IV Heparin drip, [ x ] SCD [  ] Contraindication 2 to GI Bleed,[  ] Ambulation  Advanced directive: [x  ] None, [  ] DNR/DNI

## 2018-12-04 NOTE — PROGRESS NOTE ADULT - ASSESSMENT
Pt is a 61 y/o M with PMHx of Type 2 DM, HTN, HLD, obesity, nephrolithiasis (last stone 25 years ago), neuropathy, HE, NAFLD, who presents with vomiting and near syncope/falling out of a moving car. Admitted for vomiting and near syncope. Likely Vasovagal, Acute blood Loss anemia, S/P ICU transfer for upper GI bleed S/P EGD showed bleeding Duodenal ulcer. 7 u pRBC given. Patient has no acute complaints this AM. Pt is a 63 y/o M with PMHx of Type 2 DM, HTN, HLD, obesity, nephrolithiasis (last stone 25 years ago), neuropathy, HE, NAFLD, who presents with vomiting and near syncope/falling out of a moving car. Admitted for vomiting and near syncope. Likely Vasovagal, Acute blood Loss anemia, S/P ICU transfer for upper GI bleed S/P EGD showed bleeding Duodenal ulcer s/p clips x2, epi injection, and cautery. 3 units pRBC given yst. . Patient has no acute complaints this AM.

## 2018-12-04 NOTE — PROGRESS NOTE ADULT - SUBJECTIVE AND OBJECTIVE BOX
All interim records and events noted.    up in chair, no abdomen pain, comfortable at present      MEDICATIONS  (STANDING):  atorvastatin 10 milliGRAM(s) Oral at bedtime  dextrose 5%. 1000 milliLiter(s) (50 mL/Hr) IV Continuous <Continuous>  dextrose 50% Injectable 12.5 Gram(s) IV Push once  dextrose 50% Injectable 25 Gram(s) IV Push once  dextrose 50% Injectable 25 Gram(s) IV Push once  gabapentin 200 milliGRAM(s) Oral four times a day  insulin glargine Injectable (LANTUS) 50 Unit(s) SubCutaneous at bedtime  insulin lispro (HumaLOG) corrective regimen sliding scale   SubCutaneous every 6 hours  pantoprazole Infusion 8 mG/Hr (10 mL/Hr) IV Continuous <Continuous>  rifaximin 550 milliGRAM(s) Oral two times a day  sucralfate 1 Gram(s) Oral every 6 hours  ursodiol Capsule 300 milliGRAM(s) Oral three times a day    MEDICATIONS  (PRN):  dextrose 40% Gel 15 Gram(s) Oral once PRN Blood Glucose LESS THAN 70 milliGRAM(s)/deciliter  glucagon  Injectable 1 milliGRAM(s) IntraMuscular once PRN Glucose LESS THAN 70 milligrams/deciliter  lidocaine 2% Gel 1 Application(s) Topical every 3 hours PRN pain      Vital Signs Last 24 Hrs  T(C): 36.9 (04 Dec 2018 08:00), Max: 37.1 (04 Dec 2018 07:58)  T(F): 98.5 (04 Dec 2018 08:00), Max: 98.7 (04 Dec 2018 07:58)  HR: 99 (04 Dec 2018 10:00) (99 - 146)  BP: 110/55 (04 Dec 2018 10:00) (93/52 - 182/75)  BP(mean): 77 (04 Dec 2018 10:00) (68 - 108)  RR: 13 (04 Dec 2018 10:00) (12 - 31)  SpO2: 93% (04 Dec 2018 10:00) (92% - 100%)    PHYSICAL EXAM  General: well developed  well nourished, in no acute distress  Head: atraumatic, normocephalic  ENT: sclera anicteric, buccal mucosa moist  Neck: supple, trachea midline, no palpable masses  CV: S1 S2, regular rate and rhythm  Lungs: clear to auscultation, no wheezes/rhonchi  Abdomen: soft, nontender, bowel sounds present, pouchy  Extrem: no clubbing/cyanosis/edema  Skin: no significant increased ecchymosis/petechiae  Neuro: alert and oriented X3,  no focal deficits      LABS:             7.8    9.47  )-----------( 98       ( 12-04 @ 06:12 )             22.5                7.3    12.53 )-----------( 94       ( 12-04 @ 00:27 )             20.9                6.6    x     )-----------( x        ( 12-03 @ 16:01 )             19.0                7.7    15.08 )-----------( 70       ( 12-03 @ 06:24 )             22.4                8.2    10.80 )-----------( 100      ( 12-02 @ 16:58 )             23.3                8.4    15.98 )-----------( 97       ( 12-02 @ 12:52 )             23.9                8.5    16.55 )-----------( 97       ( 12-02 @ 08:40 )             24.3                6.2    8.42  )-----------( 122      ( 12-01 @ 20:46 )             18.1                7.0    x     )-----------( x        ( 12-01 @ 14:38 )             20.7       12-04    140  |  105  |  40<H>  ----------------------------<  149<H>  3.9   |  28  |  1.00    Ca    7.3<L>      04 Dec 2018 06:12  Phos  1.9     12-04  Mg     1.7     12-04    TPro  4.4<L>  /  Alb  2.2<L>  /  TBili  3.6<H>  /  DBili  x   /  AST  33  /  ALT  21  /  AlkPhos  73  12-04 12-04 @ 06:12  PT18.6 INR1.61  PTT27.7  12-03 @ 06:24  PT18.5 INR1.61  PTT30.6  12-02 @ 12:52  PT18.5 INR1.60  PTT30.3  12-02 @ 08:40  PT19.3 INR1.67  UAJ738.6  12-01 @ 20:48  PT20.9 INR1.82  PTT33.4  11-30 @ 16:25  PT19.6 INR1.69  PTT33.1      RADIOLOGY & ADDITIONAL STUDIES:    IMPRESSION/RECOMMENDATIONS:

## 2018-12-04 NOTE — PROGRESS NOTE ADULT - PROBLEM SELECTOR PLAN 7
2/2 Cirrhosis -ammonia level improved ? 2/2 GI Bleed ; Pt not confused at baseline MS, no asterixis  -- High Ammonia level - Improved,  Lactulose Stopped  on lactulose at home (not on rifaximin at home)   rifaximin 550 BID  -ammonia level 51 2/2 Cirrhosis -ammonia level improved ? 2/2 GI Bleed ; Pt not confused at baseline MS, no asterixis  -- High Ammonia level - Improved,  Lactulose Stopped  on lactulose at home (not on rifaximin at home)   rifaximin 550 BID  -ammonia level 80 2/2 Cirrhosis -ammonia level improved ? 2/2 GI Bleed ; Pt not confused at baseline MS, no asterixis  -- High Ammonia level improved to 50 and then increased to 80  on lactulose at home (not on rifaximin at home)   rifaximin 550 BID poss sec to vasovagal ? LOC- unclear, Hypotension 2/2 likely GI Bleed  -HOLD Lasix & ARB  - CT Head neg , CT Neck -No Fx , S/P  IV Fluid bolus x 4 Lit in ER   - EKG - normal sinus rhythm;   - Neurology Dr Peña on case   - fall risk precautions, OOB to chair

## 2018-12-04 NOTE — PROGRESS NOTE ADULT - SUBJECTIVE AND OBJECTIVE BOX
Neurology follow up note    JESSICA [id number]      Interval History:    Patient feels ok no new complaints.    MEDICATIONS    atorvastatin 10 milliGRAM(s) Oral at bedtime  dextrose 40% Gel 15 Gram(s) Oral once PRN  dextrose 5%. 1000 milliLiter(s) IV Continuous <Continuous>  dextrose 50% Injectable 12.5 Gram(s) IV Push once  dextrose 50% Injectable 25 Gram(s) IV Push once  dextrose 50% Injectable 25 Gram(s) IV Push once  gabapentin 200 milliGRAM(s) Oral four times a day  glucagon  Injectable 1 milliGRAM(s) IntraMuscular once PRN  insulin glargine Injectable (LANTUS) 50 Unit(s) SubCutaneous at bedtime  insulin lispro (HumaLOG) corrective regimen sliding scale   SubCutaneous every 6 hours  lactulose Syrup 15 Gram(s) Oral two times a day  lidocaine 2% Gel 1 Application(s) Topical every 3 hours PRN  losartan 100 milliGRAM(s) Oral daily  pantoprazole Infusion 8 mG/Hr IV Continuous <Continuous>  rifaximin 550 milliGRAM(s) Oral two times a day  sucralfate 1 Gram(s) Oral every 6 hours  ursodiol Capsule 300 milliGRAM(s) Oral three times a day      Allergies    codeine (Anaphylaxis)    Intolerances            Vital Signs Last 24 Hrs  T(C): 36.9 (04 Dec 2018 12:00), Max: 37.1 (04 Dec 2018 07:58)  T(F): 98.4 (04 Dec 2018 12:00), Max: 98.7 (04 Dec 2018 07:58)  HR: 100 (04 Dec 2018 12:00) (99 - 146)  BP: 104/51 (04 Dec 2018 12:00) (93/52 - 182/75)  BP(mean): 73 (04 Dec 2018 12:00) (68 - 108)  RR: 17 (04 Dec 2018 12:00) (12 - 31)  SpO2: 93% (04 Dec 2018 12:00) (92% - 100%)      REVIEW OF SYSTEMS:  Constitutional: No fever, chills, fatigue, generalized weakness  Eyes: no eye pain, visual disturbances, or discharge  ENT:  No difficulty hearing, tinnitus, vertigo; No sinus or throat pain  Neck: No pain or stiffness  Respiratory: No cough, dyspnea, wheezing   Cardiovascular: No chest pain, palpitations,   Gastrointestinal: No abdominal or epigastric pain. No nausea, vomiting  No diarrhea or constipation.   Genitourinary: No dysuria, frequency, hematuria or incontinence  Neurological: No headaches, postive lightheadedness, no vertigo, numbness or tremors  Psychiatric: No depression, anxiety, mood swings or difficulty sleeping  Musculoskeletal: No joint pain or swelling; No muscle, back or extremity pain  Skin: No itching, burning, rashes or lesions   Lymph Nodes: No enlarged glands  Endocrine: No heat or cold intolerance; No hair loss   Allergy and Immunologic: No hives or eczema    On Neurological Examination:    The patient is awake, alert, oriented x3.    Extraocular movements were intact.    Pupils equal, round and reactive bilaterally, 3 mm to 2.    Speech was fluent.  Smile symmetric.    Motor:  Bilateral upper and lower 5/5.    Sensory:  Bilateral upper and lower intact to light touch.  No drift.  Finger-to-nose within normal limits.    Follow simple commands      GENERAL Exam: Nontoxic , No Acute Distress   	  HEENT:  normocephalic, atraumatic  		  LUNGS: Clear bilaterally    	  HEART: Normal S1S2   No murmur RRR        	  GI/ ABDOMEN:  Soft  Non tender    EXTREMITIES:   No Edema  No Clubbing  No Cyanosis     MUSCULOSKELETAL: decreased Range of Motion all 4 extremities   	   SKIN: Normal  No Ecchymosis             LABS:  CBC Full  -  ( 04 Dec 2018 06:12 )  WBC Count : 9.47 K/uL  Hemoglobin : 7.8 g/dL  Hematocrit : 22.5 %  Platelet Count - Automated : 98 K/uL  Mean Cell Volume : 87.9 fl  Mean Cell Hemoglobin : 30.5 pg  Mean Cell Hemoglobin Concentration : 34.7 gm/dL  Auto Neutrophil # : 6.14 K/uL  Auto Lymphocyte # : 2.18 K/uL  Auto Monocyte # : 0.96 K/uL  Auto Eosinophil # : 0.01 K/uL  Auto Basophil # : 0.02 K/uL  Auto Neutrophil % : 64.9 %  Auto Lymphocyte % : 23.0 %  Auto Monocyte % : 10.1 %  Auto Eosinophil % : 0.1 %  Auto Basophil % : 0.2 %      12-04    140  |  105  |  40<H>  ----------------------------<  149<H>  3.9   |  28  |  1.00    Ca    7.3<L>      04 Dec 2018 06:12  Phos  1.9     12-04  Mg     1.7     12-04    TPro  4.4<L>  /  Alb  2.2<L>  /  TBili  3.6<H>  /  DBili  x   /  AST  33  /  ALT  21  /  AlkPhos  73  12-04    Hemoglobin A1C:     LIVER FUNCTIONS - ( 04 Dec 2018 06:12 )  Alb: 2.2 g/dL / Pro: 4.4 g/dL / ALK PHOS: 73 U/L / ALT: 21 U/L / AST: 33 U/L / GGT: x           Vitamin B12   PT/INR - ( 04 Dec 2018 06:12 )   PT: 18.6 sec;   INR: 1.61 ratio         PTT - ( 04 Dec 2018 06:12 )  PTT:27.7 sec      RADIOLOGY      ANALYSIS AND PLAN:  A 62-year-old with episode of loss of consciousness.  1.	For episode of loss of consciousness, suspect most likely a syncopal event with a prodrome of feeling lightheaded, blurry vision, warm and sweaty, nausea and vomiting, as per spouse looked pale and becoming in the emergency room hypotensive points towards cerebral hypoperfusion.  There is no clear sign on examination to suggest a new cerebrovascular accident has ensued and there is no clear history to suggest underlying epilepsy.  2.	I would recommend telemetry evaluation, rule out underlying arrhythmia.  3.	Monitor systolic blood pressure.  4.	For history of diabetes, would recommend strict control of the patient's blood sugars.  5.	For history of neuropathy, continue the patient on his gabapentin.  6.	For episode of spasms, I would check calcium and phosphorus levels.  Questionable this could be secondary to slightly low magnesium.  7.	Spoke with spouse, Ada,  Her telephone number is [phone]/[phone]/18  8.	monitor H/H   9.	GI work up as needed S/P GI bleed  10.	no new events   11.	monitor oral intake       Greater than 45 minutes spent in direct patient care reviewing  the notes, lab data/ imaging , discussion with multidisciplinary team.

## 2018-12-04 NOTE — PROGRESS NOTE ADULT - PROBLEM SELECTOR PLAN 4
likely 2/2 Hypotension, Pre Renal  Improving  Beasley cath I/O   BMP in AM  -HLOD ARB/Lasix likely 2/2 Hypotension, Pre Renal  Improving  Beasley cath I/O   -HLOD ARB/Lasix - ? Etiology , Repeat level improving,  NOW leukocytosis at 9.47,  pt is afebrile  - Pt had increased Lactate level from ER which had improved   -Leukocytosis -Reactive likely- improved.   - Likely sec to re starting Metformin / hypotension   -  RVP- neg  - Blood cx- negative  - UCx negative   -NEG S/P IV Fluids

## 2018-12-04 NOTE — PROGRESS NOTE ADULT - ATTENDING COMMENTS
Pt seen, Examined, case & care plan d/w pt, residents , ICU DR CAMP at detail.  D/W Dr montalvo   AM labs

## 2018-12-04 NOTE — PROGRESS NOTE ADULT - SUBJECTIVE AND OBJECTIVE BOX
Subjective: Pt with further bleeding, had EGD last pm with cauterization  of vessel    Objective:  Vital Signs Last 24 Hrs  T(C): 36.9 (04 Dec 2018 08:00), Max: 37.1 (04 Dec 2018 07:58)  T(F): 98.5 (04 Dec 2018 08:00), Max: 98.7 (04 Dec 2018 07:58)  HR: 99 (04 Dec 2018 10:00) (99 - 146)  BP: 110/55 (04 Dec 2018 10:00) (93/52 - 182/75)  BP(mean): 77 (04 Dec 2018 10:00) (68 - 108)  RR: 13 (04 Dec 2018 10:00) (12 - 31)  SpO2: 93% (04 Dec 2018 10:00) (92% - 100%)    Heent: MARCY MADRIGAL  Pul: clear  Cor: RSR, without murmurs  Abdomen: normal bowel sounds, without distension of tenderness   Extremities: without edema, motor/sensory intact, without calf pain, Homans sign negative.                        7.8    9.47  )-----------( 98       ( 04 Dec 2018 06:12 )             22.5       12-04    140  |  105  |  40<H>  ----------------------------<  149<H>  3.9   |  28  |  1.00    Ca    7.3<L>      04 Dec 2018 06:12  Phos  1.9     12-04  Mg     1.7     12-04    TPro  4.4<L>  /  Alb  2.2<L>  /  TBili  3.6<H>  /  DBili  x   /  AST  33  /  ALT  21  /  AlkPhos  73  12-04 12-03 @ 07:01  -  12-04 @ 07:00  --------------------------------------------------------  IN:    lactated ringers.: 1725 mL    Oral Fluid: 1100 mL    Packed Red Blood Cells: 948 mL    pantoprazole Infusion: 240 mL    Plasma: 300 mL    Platelets - Single Donor: 300 mL    Solution: 50 mL    Solution: 50 mL  Total IN: 4713 mL    OUT:    Indwelling Catheter - Urethral: 1825 mL  Total OUT: 1825 mL    Total NET: 2888 mL      12-04 @ 07:01 - 12-04 @ 11:17  --------------------------------------------------------  IN:    lactated ringers.: 75 mL    Oral Fluid: 200 mL    pantoprazole Infusion: 20 mL    Solution: 50 mL    Solution: 125 mL  Total IN: 470 mL    OUT:    Indwelling Catheter - Urethral: 2000 mL  Total OUT: 2000 mL    Total NET: -1530 mL

## 2018-12-04 NOTE — PROGRESS NOTE ADULT - PROBLEM SELECTOR PLAN 1
Bleeding Duodenal ulcer on Emergent EGD in ICU   -pt had multiple  bloody BM  2/2   rapid upper GI bleed   - Ac Blood loss Anemia with Bleeding DU - H/H Low stable,  - hx of PUD, Esophagitis, Gastritis, non bleeding ulcers on last EGD10/18  - s/p 3 units FFP, 7 units pRBC, 1 platelets & Vit K 5 mg x 1 dose given  - GI ([facility]) following  - Dr Hernandez following  - If further bleeding ---> plan to transfer to [facility] for IR / surgical intervention  - c/w Zofran q6hr, IV Protonix  Drip , Carafate q 6 hrs   - NPO with ice   - Serial CBC, -Coagulapathy with liver disease  - ICU care Bleeding Duodenal ulcer on Emergent EGD in ICU  s/p clips x2, epi injection, and cautery.   -pt had multiple  bloody BM  2/2   rapid upper GI bleed   - Ac Blood loss Anemia with Bleeding DU - H/H Low stable,  - hx of PUD, Esophagitis, Gastritis, non bleeding ulcers on last EGD10/18  - s/p 3 units FFP, 7 units pRBC, 1 platelets & Vit K 5 mg x 1 dose given  - GI ([facility]) following  - Dr Hernandez following  - If further bleeding ---> plan to transfer to [facility] for IR / surgical intervention  - c/w Zofran q6hr, IV Protonix  Drip , Carafate q 6 hrs   - NPO with ice   - Serial CBC, -Coagulapathy with liver disease  - ICU care - Bleeding Duodenal ulcer on Emergent EGD in ICU s/p clips x2, epi injection, and cautery.   - pt had multiple  bloody BM  2/2   rapid upper GI bleed   - Acute Blood loss Anemia with Bleeding DU - H/H Low stable  - hx of PUD, Esophagitis, Gastritis, non bleeding ulcers on last EGD10/18  - s/p 3 units FFP, 7 units pRBC, 1 platelets & Vit K 5 mg x 1 dose given  - GI ([facility]) following  - Dr Hernandez following  - If further bleeding ---> plan to transfer to [facility] for IR / surgical intervention  - c/w Zofran q6hr, IV Protonix  Drip , Carafate q 6 hrs   - clear liquid diet   - Serial CBC, -Coagulapathy with liver disease  - ICU care - Bleeding Duodenal ulcer on Emergent 2 nd EGD on 12/3/ in ICU s/p clips x2, epi injection, and cautery. Pt had EGD done on Weekend,   - pt had multiple  bloody BM  2/2   rapid upper GI bleed   - Acute Blood loss Anemia with Bleeding DU - H/H Low stable  - hx of PUD, Esophagitis, Gastritis, non bleeding ulcers on last EGD10/18  - s/p 3 units FFP, 7 units pRBC, 1 platelets & Vit K 5 mg x 1 dose given  - GI ([facility]) following  - Dr Hernandez following  - If further bleeding ---> plan to transfer to [facility] for IR / surgical intervention  - c/w Zofran q6hr, IV Protonix  Drip , Carafate q 6 hrs   - clear liquid diet   - Serial CBC, -Coagulapathy with liver disease  - ICU care

## 2018-12-04 NOTE — PROGRESS NOTE ADULT - PROBLEM SELECTOR PLAN 3
-elevated Bili -?? Etiology Coagulopathy 2/2 Liver disease   Abnormal LFT's , Bili & PT/PTT/INR - Dr Rios -Hem Follow up  - pt follows GI (Dr. Morales)   -cont ppi, sandra, rifaximin bid  - f/u CMP in AM

## 2018-12-04 NOTE — PROGRESS NOTE ADULT - SUBJECTIVE AND OBJECTIVE BOX
SURGERY    SUBJECTIVE:   Patient seen at bedside, s/p repeat EGD yesterday with clipping and cautery of bleeding DU, received 1 unit PRBC overnight for low H / H with good response.   As per nurse patient had 2 episodes of melena yesterday  Patient states no complaints noted, no BM today  Patient denies any headaches, chest pain, shortness of breath, nausea, vomiting, fever, chills, weakness  Patient A+Ox3 in NAD at time of visit.    OBJECTIVE:   T(C): 36.9 (12-04-18 @ 08:00), Max: 37.1 (12-04-18 @ 07:58)  HR: 101 (12-04-18 @ 09:00) (101 - 146)  BP: 110/55 (12-04-18 @ 09:00) (93/52 - 182/75)  RR: 14 (12-04-18 @ 09:00) (12 - 31)  SpO2: 93% (12-04-18 @ 09:00) (92% - 100%)  Wt(kg): --  CAPILLARY BLOOD GLUCOSE      POCT Blood Glucose.: 155 mg/dL (04 Dec 2018 05:42)  POCT Blood Glucose.: 285 mg/dL (03 Dec 2018 23:18)  POCT Blood Glucose.: 253 mg/dL (03 Dec 2018 17:18)  POCT Blood Glucose.: 216 mg/dL (03 Dec 2018 12:31)    I&O's Detail    03 Dec 2018 07:01  -  04 Dec 2018 07:00  --------------------------------------------------------  IN:    lactated ringers.: 1725 mL    Oral Fluid: 1100 mL    Packed Red Blood Cells: 948 mL    pantoprazole Infusion: 240 mL    Plasma: 300 mL    Platelets - Single Donor: 300 mL    Solution: 50 mL    Solution: 50 mL  Total IN: 4713 mL    OUT:    Indwelling Catheter - Urethral: 1825 mL  Total OUT: 1825 mL    Total NET: 2888 mL      04 Dec 2018 07:01  -  04 Dec 2018 10:11  --------------------------------------------------------  IN:    lactated ringers.: 75 mL    Oral Fluid: 200 mL    pantoprazole Infusion: 20 mL    Solution: 50 mL    Solution: 125 mL  Total IN: 470 mL    OUT:    Indwelling Catheter - Urethral: 2000 mL  Total OUT: 2000 mL    Total NET: -1530 mL          Physical exam:  General: A+O x 3 in NAD  Abdomen: soft non distended, non tympanic, BS x 4, no guarding noted  Extremities: no edema noted, warm,  no calf tenderness     MEDICATIONS  (STANDING):  atorvastatin 10 milliGRAM(s) Oral at bedtime  dextrose 5%. 1000 milliLiter(s) (50 mL/Hr) IV Continuous <Continuous>  dextrose 50% Injectable 12.5 Gram(s) IV Push once  dextrose 50% Injectable 25 Gram(s) IV Push once  dextrose 50% Injectable 25 Gram(s) IV Push once  gabapentin 200 milliGRAM(s) Oral four times a day  insulin glargine Injectable (LANTUS) 50 Unit(s) SubCutaneous at bedtime  insulin lispro (HumaLOG) corrective regimen sliding scale   SubCutaneous every 6 hours  pantoprazole Infusion 8 mG/Hr (10 mL/Hr) IV Continuous <Continuous>  rifaximin 550 milliGRAM(s) Oral two times a day  sucralfate 1 Gram(s) Oral every 6 hours  ursodiol Capsule 300 milliGRAM(s) Oral three times a day    MEDICATIONS  (PRN):  dextrose 40% Gel 15 Gram(s) Oral once PRN Blood Glucose LESS THAN 70 milliGRAM(s)/deciliter  glucagon  Injectable 1 milliGRAM(s) IntraMuscular once PRN Glucose LESS THAN 70 milligrams/deciliter  lidocaine 2% Gel 1 Application(s) Topical every 3 hours PRN pain      LABS:                        7.8    9.47  )-----------( 98       ( 04 Dec 2018 06:12 )             22.5     12-04    140  |  105  |  40<H>  ----------------------------<  149<H>  3.9   |  28  |  1.00    Ca    7.3<L>      04 Dec 2018 06:12  Phos  1.9     12-04  Mg     1.7     12-04    TPro  4.4<L>  /  Alb  2.2<L>  /  TBili  3.6<H>  /  DBili  x   /  AST  33  /  ALT  21  /  AlkPhos  73  12-04    PT/INR - ( 04 Dec 2018 06:12 )   PT: 18.6 sec;   INR: 1.61 ratio         PTT - ( 04 Dec 2018 06:12 )  PTT:27.7 sec      RADIOLOGY & ADDITIONAL STUDIES:    Assessment  62 year old male with GI Bleed, s/p repeat EGD with clipping and cautery of bleeding DU,  1 unit prbc overnight with good response in hematocrit    Plan  - serial CBC  - transfuse if hb <7 or if patient is symptomatic  - f/u GI  - continue protonix drip / HD monitoring

## 2018-12-04 NOTE — PROGRESS NOTE ADULT - ASSESSMENT
62M with PMHx as above, admitted with near syncope/fall, vomiting, ABLA due to large duodenal ulcer with clot s/p clip x4, epi and cautery. Pt transfused 10 units prbc, 2 plts, 4 ffps. Pt also with MISA, lactic acidosis, uncontrolled hyperglycemia, and coagulopathy due to underlying JEAN    - 62M with PMHx as above, admitted with near syncope/fall, vomiting, ABLA due to large duodenal ulcer with clot s/p clip x4, epi and cautery. Pt transfused 10 units prbc, 2 plts, 4 ffps. Pt also with MISA, lactic acidosis, uncontrolled hyperglycemia, and coagulopathy due to underlying JEAN    -Continue rifaximine/lactulose for hepatic encephalopathy  -Monitor HD's. Tachycardia improved. Holding AC   -Good O2 saturation on room air. Continue to monitor  -ADAT. PPI gtt  -Continue carafate  -F/U GI  -Serial H/H. Transfuse prn to keep Hgb > 7  -MISA improving. Monitor UOP/Lytes  -Replete hypophosphatemia and hypomagnesemia  -Lantus increased. Monitor FS as per protocol  -SCD for DVT ppx  -Supportive care

## 2018-12-04 NOTE — PROGRESS NOTE ADULT - ATTENDING COMMENTS
62M PMH DM2, neuropathy, HTN, HLD, JEAN, hepatic encephalopathy, & PUD who presents with acute blood loss anemia due to upper GI bleed, s/p EGD x2, found to have large bleeding duodenal ulcer s/p clips x2, epi injection, and cautery. S/p 10 units prbc's, 2 plts, and 4 FFP. Also with MISA, lactic acidosis, uncontrolled hyperglycemia, and coagulopathy due to underlying JEAN.     - Hepatic encephalopathy, continue rifaximin. Add lactulose  - HD stable, resolved sinus tachycardia. Hold antiplatelets and a/c  - Stable respiratory status at presents, on room air with SpO2>90%  - Continue PPI gtt today, change to PPI bid tomorrow. Continue sucralfate. Advance diet as tolerated. F/up GI. Hold a/c and antiplatelets. If continues to have bleeding, may need IR or surgical evaluation  - Stable H/H, continue to monitor for bleeding. SCD's for DVT ppx  - Resolved lactic acidosis  - MISA improving, strict I/O's, trend kidney function and lytes. D/c TERESO bob. Replete low mg and phos  - Observe off abx  - Increase lantus to 50 qhs, continue insulin coverage scale. When restarts meals, will restart insulin lispro. A1c 8.8  - Discussed with patient at bedside, full code

## 2018-12-04 NOTE — PROGRESS NOTE ADULT - SUBJECTIVE AND OBJECTIVE BOX
Patient is a 62y old  Male who presents with a chief complaint of vomiting and near syncope (03 Dec 2018 21:12)    Brief hospital course: 62M with PMH of HTN, HLD, obesity, nephrolithiasis (last stone 25 years ago), DM type 2, diabetic neuropathy, HE, NAFLD, presents with vomiting and near syncope/falling out of a moving car and admitted for observation. Subsequently had large bloody BMs. Transferred to ICU for UGIB. s/p EGD showing large bleeding duodenal ulcer. Repeat bedside EGD showed large bleeding ulcer with clipx2, epi, and cautery. s/p 9PRBC/4FFP/2Platelet     24 hour events: Bedside EGD yesterday with clipping and cautery. No bloody BM overnight.     Review of Systems:  Constitutional: No fever, chills, fatigue  Neuro: No headache, numbness, weakness  Resp: No cough, wheezing, shortness of breath  CVS: No chest pain, palpitations, leg swelling  GI: No abdominal pain, nausea, vomiting, diarrhea   : No dysuria, frequency, incontinence  Skin: No itching, burning, rashes, or lesions   Msk: No joint pain or swelling  Psych: No depression, anxiety, mood swings      ICU Vital Signs Last 24 Hrs  T(C): 36.9 (04 Dec 2018 05:05), Max: 37 (03 Dec 2018 08:00)  T(F): 98.4 (04 Dec 2018 05:05), Max: 98.6 (03 Dec 2018 08:00)  HR: 108 (04 Dec 2018 07:00) (106 - 146)  BP: 129/59 (04 Dec 2018 07:00) (93/52 - 182/75)  BP(mean): 85 (04 Dec 2018 07:00) (68 - 108)  RR: 13 (04 Dec 2018 07:00) (12 - 31)  SpO2: 94% (04 Dec 2018 07:00) (93% - 100%)        CAPILLARY BLOOD GLUCOSE    POCT Blood Glucose.: 155 mg/dL (04 Dec 2018 05:42)      I&O's Summary    03 Dec 2018 07:01  -  04 Dec 2018 07:00  --------------------------------------------------------  IN: 4713 mL / OUT: 1825 mL / NET: 2888 mL        Physical Exam:   Gen:  Neuro:  HEENT:  Resp:  CVS:  Abd:  Ext:  Skin:      MEDICATIONS  (STANDING):  lactated ringers. 1000 milliLiter(s) (75 mL/Hr) IV Continuous <Continuous>  atorvastatin 10 milliGRAM(s) Oral at bedtime  gabapentin 200 milliGRAM(s) Oral four times a day  pantoprazole Infusion 8 mG/Hr (10 mL/Hr) IV Continuous <Continuous>  rifaximin 550 milliGRAM(s) Oral two times a day  sucralfate 1 Gram(s) Oral every 6 hours  ursodiol Capsule 300 milliGRAM(s) Oral three times a day  insulin glargine Injectable (LANTUS) 40 Unit(s) SubCutaneous at bedtime  insulin lispro (HumaLOG) corrective regimen sliding scale   SubCutaneous every 6 hours  dextrose 5%. 1000 milliLiter(s) (50 mL/Hr) IV Continuous <Continuous>  dextrose 50% Injectable 12.5 Gram(s) IV Push once  dextrose 50% Injectable 25 Gram(s) IV Push once  dextrose 50% Injectable 25 Gram(s) IV Push once    MEDICATIONS  (PRN):  dextrose 40% Gel 15 Gram(s) Oral once PRN Blood Glucose LESS THAN 70 milliGRAM(s)/deciliter  glucagon  Injectable 1 milliGRAM(s) IntraMuscular once PRN Glucose LESS THAN 70 milligrams/deciliter  lidocaine 2% Gel 1 Application(s) Topical every 3 hours PRN pain        Labs:                         7.8    9.47  )-----------( 98       ( 04 Dec 2018 06:12 )             22.5     12-04    140  |  105  |  40<H>  ----------------------------<  149<H>  3.9   |  28  |  1.00    Ca    7.3<L>      04 Dec 2018 06:12  Phos  1.9     12-04  Mg     1.7     12-04    TPro  4.4<L>  /  Alb  2.2<L>  /  TBili  3.6<H>  /  DBili  x   /  AST  33  /  ALT  21  /  AlkPhos  73  12-04      CARDIAC MARKERS ( 02 Dec 2018 08:40 )  x     / x     / 504 U/L / x     / x          PT/INR - ( 04 Dec 2018 06:12 )   PT: 18.6 sec;   INR: 1.61 ratio    PTT - ( 04 Dec 2018 06:12 )  PTT:27.7 sec    Ammonia, Serum (12.04.18 @ 06:12)    Ammonia, Serum: 80 umol/L      Radiology:               Bedside ultrasound: Not performed    CENTRAL LINE: RIJ (placed 12/1/18)   HAYES: Y  A-LINE: N    GLOBAL ISSUE/BEST PRACTICE:  Analgesia: N  Sedation: N  CAM-ICU:   HOB elevation: N    Stress ulcer prophylaxis: Y   VTE prophylaxis: SCD in setting of GIB  Glycemic control: Y    Nutrition: NPO except meds     CODE STATUS: FULL Patient is a 62y old  Male who presents with a chief complaint of vomiting and near syncope (03 Dec 2018 21:12)    Brief hospital course: 62M with PMH of HTN, HLD, obesity, nephrolithiasis (last stone 25 years ago), DM type 2, diabetic neuropathy, HE, NAFLD, presents with vomiting and near syncope/falling out of a moving car and admitted for observation. Subsequently had large bloody BMs. Transferred to ICU for UGIB. s/p EGD showing large bleeding duodenal ulcer. Repeat bedside EGD showed large bleeding ulcer with clipx2, epi, and cautery. s/p 9PRBC/4FFP/2Platelet     24 hour events: Bedside EGD yesterday with clipping and cautery. No bloody BM overnight.     Review of Systems:  Constitutional: No fever, chills, fatigue  Neuro: No headache, numbness, weakness  Resp: No cough, wheezing, shortness of breath  CVS: No chest pain, palpitations, leg swelling  GI: No abdominal pain, n/v/d, no bloody BM  : No dysuria, frequency, incontinence  Skin: No itching, burning, rashes, or lesions   Msk: No joint pain or swelling      ICU Vital Signs Last 24 Hrs  T(C): 36.9 (04 Dec 2018 05:05), Max: 37 (03 Dec 2018 08:00)  T(F): 98.4 (04 Dec 2018 05:05), Max: 98.6 (03 Dec 2018 08:00)  HR: 108 (04 Dec 2018 07:00) (106 - 146)  BP: 129/59 (04 Dec 2018 07:00) (93/52 - 182/75)  BP(mean): 85 (04 Dec 2018 07:00) (68 - 108)  RR: 13 (04 Dec 2018 07:00) (12 - 31)  SpO2: 94% (04 Dec 2018 07:00) (93% - 100%)        CAPILLARY BLOOD GLUCOSE    POCT Blood Glucose.: 155 mg/dL (04 Dec 2018 05:42)      I&O's Summary    03 Dec 2018 07:01  -  04 Dec 2018 07:00  --------------------------------------------------------  IN: 4713 mL / OUT: 1825 mL / NET: 2888 mL        Physical Exam:   Gen: NAD  Neuro: A&O  HEENT: EOMI, clear conjunctiva   Resp: CTAB, no cough, wheeze  CVS: RRR with no MRG  Abd: soft, NT/ND, +BS  Ext: No clubbing, cyanosis, edema   Skin: No rash, lesions.       MEDICATIONS  (STANDING):  lactated ringers. 1000 milliLiter(s) (75 mL/Hr) IV Continuous <Continuous>  atorvastatin 10 milliGRAM(s) Oral at bedtime  gabapentin 200 milliGRAM(s) Oral four times a day  pantoprazole Infusion 8 mG/Hr (10 mL/Hr) IV Continuous <Continuous>  rifaximin 550 milliGRAM(s) Oral two times a day  sucralfate 1 Gram(s) Oral every 6 hours  ursodiol Capsule 300 milliGRAM(s) Oral three times a day  insulin glargine Injectable (LANTUS) 40 Unit(s) SubCutaneous at bedtime  insulin lispro (HumaLOG) corrective regimen sliding scale   SubCutaneous every 6 hours  dextrose 5%. 1000 milliLiter(s) (50 mL/Hr) IV Continuous <Continuous>  dextrose 50% Injectable 12.5 Gram(s) IV Push once  dextrose 50% Injectable 25 Gram(s) IV Push once  dextrose 50% Injectable 25 Gram(s) IV Push once    MEDICATIONS  (PRN):  dextrose 40% Gel 15 Gram(s) Oral once PRN Blood Glucose LESS THAN 70 milliGRAM(s)/deciliter  glucagon  Injectable 1 milliGRAM(s) IntraMuscular once PRN Glucose LESS THAN 70 milligrams/deciliter  lidocaine 2% Gel 1 Application(s) Topical every 3 hours PRN pain        Labs:                         7.8    9.47  )-----------( 98       ( 04 Dec 2018 06:12 )             22.5     12-04    140  |  105  |  40<H>  ----------------------------<  149<H>  3.9   |  28  |  1.00    Ca    7.3<L>      04 Dec 2018 06:12  Phos  1.9     12-04  Mg     1.7     12-04    TPro  4.4<L>  /  Alb  2.2<L>  /  TBili  3.6<H>  /  DBili  x   /  AST  33  /  ALT  21  /  AlkPhos  73  12-04      CARDIAC MARKERS ( 02 Dec 2018 08:40 )  x     / x     / 504 U/L / x     / x          PT/INR - ( 04 Dec 2018 06:12 )   PT: 18.6 sec;   INR: 1.61 ratio    PTT - ( 04 Dec 2018 06:12 )  PTT:27.7 sec    Ammonia, Serum (12.04.18 @ 06:12)    Ammonia, Serum: 80 umol/L      Radiology:               Bedside ultrasound: Not performed    CENTRAL LINE: RIJ (placed 12/1/18)   HAYES: Y  A-LINE: N    GLOBAL ISSUE/BEST PRACTICE:  Analgesia: N  Sedation: N  CAM-ICU:   HOB elevation: N    Stress ulcer prophylaxis: Y   VTE prophylaxis: SCD in setting of GIB  Glycemic control: Y    Nutrition: NPO except meds     CODE STATUS: FULL Patient is a 62y old  Male who presents with a chief complaint of vomiting and near syncope (03 Dec 2018 21:12)    Brief hospital course: 62M with PMH of HTN, HLD, obesity, nephrolithiasis (last stone 25 years ago), DM type 2, diabetic neuropathy, HE, NAFLD, presents with vomiting and near syncope/falling out of a moving car and admitted for observation. Subsequently had large bloody BMs. Transferred to ICU for UGIB. s/p EGD showing large bleeding duodenal ulcer. Repeat bedside EGD showed large bleeding ulcer with clipx2, epi, and cautery. s/p 9PRBC/4FFP/2Platelet     24 hour events: Bedside EGD yesterday with clipping and cautery. No bloody BM overnight.     Review of Systems:  Constitutional: No fever, chills, fatigue  Neuro: No headache, numbness, weakness  Resp: No cough, wheezing, shortness of breath  CVS: No chest pain, palpitations, leg swelling  GI: No abdominal pain, n/v/d, no bloody BM  : No dysuria, frequency, incontinence  Skin: No itching, burning, rashes, or lesions   Msk: No joint pain or swelling      ICU Vital Signs Last 24 Hrs  T(C): 36.9 (04 Dec 2018 05:05), Max: 37 (03 Dec 2018 08:00)  T(F): 98.4 (04 Dec 2018 05:05), Max: 98.6 (03 Dec 2018 08:00)  HR: 108 (04 Dec 2018 07:00) (106 - 146)  BP: 129/59 (04 Dec 2018 07:00) (93/52 - 182/75)  BP(mean): 85 (04 Dec 2018 07:00) (68 - 108)  RR: 13 (04 Dec 2018 07:00) (12 - 31)  SpO2: 94% (04 Dec 2018 07:00) (93% - 100%)        CAPILLARY BLOOD GLUCOSE    POCT Blood Glucose.: 155 mg/dL (04 Dec 2018 05:42)      I&O's Summary    03 Dec 2018 07:01  -  04 Dec 2018 07:00  --------------------------------------------------------  IN: 4713 mL / OUT: 1825 mL / NET: 2888 mL        Physical Exam:   Gen: NAD  Neuro: A&O  HEENT: EOMI, clear conjunctiva   Resp: CTAB, no cough, wheeze  CVS: RRR with no MRG  Abd: soft, NT/ND, +BS  Ext: No clubbing, cyanosis, edema   Skin: No rash, lesions.       MEDICATIONS  (STANDING):  lactated ringers. 1000 milliLiter(s) (75 mL/Hr) IV Continuous <Continuous>  atorvastatin 10 milliGRAM(s) Oral at bedtime  gabapentin 200 milliGRAM(s) Oral four times a day  pantoprazole Infusion 8 mG/Hr (10 mL/Hr) IV Continuous <Continuous>  rifaximin 550 milliGRAM(s) Oral two times a day  sucralfate 1 Gram(s) Oral every 6 hours  ursodiol Capsule 300 milliGRAM(s) Oral three times a day  insulin glargine Injectable (LANTUS) 40 Unit(s) SubCutaneous at bedtime  insulin lispro (HumaLOG) corrective regimen sliding scale   SubCutaneous every 6 hours  dextrose 5%. 1000 milliLiter(s) (50 mL/Hr) IV Continuous <Continuous>  dextrose 50% Injectable 12.5 Gram(s) IV Push once  dextrose 50% Injectable 25 Gram(s) IV Push once  dextrose 50% Injectable 25 Gram(s) IV Push once    MEDICATIONS  (PRN):  dextrose 40% Gel 15 Gram(s) Oral once PRN Blood Glucose LESS THAN 70 milliGRAM(s)/deciliter  glucagon  Injectable 1 milliGRAM(s) IntraMuscular once PRN Glucose LESS THAN 70 milligrams/deciliter  lidocaine 2% Gel 1 Application(s) Topical every 3 hours PRN pain        Labs:                         7.8    9.47  )-----------( 98       ( 04 Dec 2018 06:12 )             22.5     12-04    140  |  105  |  40<H>  ----------------------------<  149<H>  3.9   |  28  |  1.00    Ca    7.3<L>      04 Dec 2018 06:12  Phos  1.9     12-04  Mg     1.7     12-04    TPro  4.4<L>  /  Alb  2.2<L>  /  TBili  3.6<H>  /  DBili  x   /  AST  33  /  ALT  21  /  AlkPhos  73  12-04      CARDIAC MARKERS ( 02 Dec 2018 08:40 )  x     / x     / 504 U/L / x     / x          PT/INR - ( 04 Dec 2018 06:12 )   PT: 18.6 sec;   INR: 1.61 ratio    PTT - ( 04 Dec 2018 06:12 )  PTT:27.7 sec    Ammonia, Serum (12.04.18 @ 06:12)    Ammonia, Serum: 80 umol/L      Radiology:               Bedside ultrasound: Not performed    CENTRAL LINE: RIJ (placed 12/1/18)        Removed: 12/4/18  HAYES: Y   		                        Removed: 12/4/18  A-LINE: N    GLOBAL ISSUE/BEST PRACTICE:  Analgesia: N  Sedation: N  CAM-ICU: Neg  HOB elevation: N    Stress ulcer prophylaxis: Y   VTE prophylaxis: SCD in setting of GIB  Glycemic control: Y    Nutrition: NPO except meds     CODE STATUS: FULL Patient is a 62y old  Male who presents with a chief complaint of vomiting and near syncope (03 Dec 2018 21:12)    Brief hospital course: 62M with PMH of HTN, HLD, obesity, nephrolithiasis (last stone 25 years ago), DM type 2, diabetic neuropathy, HE, NAFLD, presents with vomiting and near syncope/falling out of a moving car and admitted for observation. Subsequently had large bloody BMs. Transferred to ICU for UGIB. s/p EGD showing large bleeding duodenal ulcer. Repeat bedside EGD showed large bleeding ulcer with clipx2, epi, and cautery. s/p 9PRBC/4FFP/2Platelet     24 hour events: Repeat bedside EGD yesterday showed large bleeding ulcer with clips x 2, epi, and cautery. Given 3PRBC/1FFP/Vit K after procedure. No BM overnight.     Review of Systems:  Constitutional: No fever, chills, fatigue  Neuro: No headache, numbness, weakness  Resp: No cough, wheezing, shortness of breath  CVS: No chest pain, palpitations, leg swelling  GI: No abdominal pain, n/v/d, no bloody BM  : No dysuria, frequency, incontinence  Skin: No itching, burning, rashes, or lesions   Msk: No joint pain or swelling      ICU Vital Signs Last 24 Hrs  T(C): 36.9 (04 Dec 2018 05:05), Max: 37 (03 Dec 2018 08:00)  T(F): 98.4 (04 Dec 2018 05:05), Max: 98.6 (03 Dec 2018 08:00)  HR: 108 (04 Dec 2018 07:00) (106 - 146)  BP: 129/59 (04 Dec 2018 07:00) (93/52 - 182/75)  BP(mean): 85 (04 Dec 2018 07:00) (68 - 108)  RR: 13 (04 Dec 2018 07:00) (12 - 31)  SpO2: 94% (04 Dec 2018 07:00) (93% - 100%)        CAPILLARY BLOOD GLUCOSE    POCT Blood Glucose.: 155 mg/dL (04 Dec 2018 05:42)      I&O's Summary    03 Dec 2018 07:01  -  04 Dec 2018 07:00  --------------------------------------------------------  IN: 4713 mL / OUT: 1825 mL / NET: 2888 mL        Physical Exam:   Gen: NAD, sitting comfortably in chair   Neuro: A&O  HEENT: EOMI, clear conjunctiva   Resp: CTAB, no cough, wheeze  CVS: RRR with no MRG  Abd: soft, NT/ND, +BS  Ext: No clubbing, cyanosis, edema   Skin: No rash, lesions.       MEDICATIONS  (STANDING):  lactated ringers. 1000 milliLiter(s) (75 mL/Hr) IV Continuous <Continuous>  atorvastatin 10 milliGRAM(s) Oral at bedtime  gabapentin 200 milliGRAM(s) Oral four times a day  pantoprazole Infusion 8 mG/Hr (10 mL/Hr) IV Continuous <Continuous>  rifaximin 550 milliGRAM(s) Oral two times a day  sucralfate 1 Gram(s) Oral every 6 hours  ursodiol Capsule 300 milliGRAM(s) Oral three times a day  insulin glargine Injectable (LANTUS) 40 Unit(s) SubCutaneous at bedtime  insulin lispro (HumaLOG) corrective regimen sliding scale   SubCutaneous every 6 hours  dextrose 5%. 1000 milliLiter(s) (50 mL/Hr) IV Continuous <Continuous>  dextrose 50% Injectable 12.5 Gram(s) IV Push once  dextrose 50% Injectable 25 Gram(s) IV Push once  dextrose 50% Injectable 25 Gram(s) IV Push once    MEDICATIONS  (PRN):  dextrose 40% Gel 15 Gram(s) Oral once PRN Blood Glucose LESS THAN 70 milliGRAM(s)/deciliter  glucagon  Injectable 1 milliGRAM(s) IntraMuscular once PRN Glucose LESS THAN 70 milligrams/deciliter  lidocaine 2% Gel 1 Application(s) Topical every 3 hours PRN pain        Labs:                         7.8    9.47  )-----------( 98       ( 04 Dec 2018 06:12 )             22.5     12-04    140  |  105  |  40<H>  ----------------------------<  149<H>  3.9   |  28  |  1.00    Ca    7.3<L>      04 Dec 2018 06:12  Phos  1.9     12-04  Mg     1.7     12-04    TPro  4.4<L>  /  Alb  2.2<L>  /  TBili  3.6<H>  /  DBili  x   /  AST  33  /  ALT  21  /  AlkPhos  73  12-04      CARDIAC MARKERS ( 02 Dec 2018 08:40 )  x     / x     / 504 U/L / x     / x          PT/INR - ( 04 Dec 2018 06:12 )   PT: 18.6 sec;   INR: 1.61 ratio    PTT - ( 04 Dec 2018 06:12 )  PTT:27.7 sec    Ammonia, Serum (12.04.18 @ 06:12)    Ammonia, Serum: 80 umol/L      Radiology: No recent imaging           Bedside ultrasound: Not performed    CENTRAL LINE: RIJ (placed 12/1/18)        Removed: 12/4/18  HAYES: Y   		                        Removed: 12/4/18  A-LINE: N    GLOBAL ISSUE/BEST PRACTICE:  Analgesia: N  Sedation: N  CAM-ICU: Neg  HOB elevation: N    Stress ulcer prophylaxis: Y   VTE prophylaxis: SCD in setting of GIB  Glycemic control: Y    Nutrition: NPO except meds     CODE STATUS: FULL

## 2018-12-04 NOTE — PROGRESS NOTE ADULT - SUBJECTIVE AND OBJECTIVE BOX
INTERVAL HPI/OVERNIGHT EVENTS:  pt seen and examined this am  oob in chair, canelo n/v//abd pain  per overnight rn no s/s active gib  labs noted, vs reviewed  sp 2u prbc 1 u plts 1u ffp   sp repeat egd yesterday    MEDICATIONS  (STANDING):  atorvastatin 10 milliGRAM(s) Oral at bedtime  dextrose 5%. 1000 milliLiter(s) (50 mL/Hr) IV Continuous <Continuous>  dextrose 50% Injectable 12.5 Gram(s) IV Push once  dextrose 50% Injectable 25 Gram(s) IV Push once  dextrose 50% Injectable 25 Gram(s) IV Push once  gabapentin 200 milliGRAM(s) Oral four times a day  insulin glargine Injectable (LANTUS) 50 Unit(s) SubCutaneous at bedtime  insulin lispro (HumaLOG) corrective regimen sliding scale   SubCutaneous every 6 hours  lactulose Syrup 15 Gram(s) Oral two times a day  pantoprazole Infusion 8 mG/Hr (10 mL/Hr) IV Continuous <Continuous>  rifaximin 550 milliGRAM(s) Oral two times a day  sucralfate 1 Gram(s) Oral every 6 hours  ursodiol Capsule 300 milliGRAM(s) Oral three times a day    MEDICATIONS  (PRN):  dextrose 40% Gel 15 Gram(s) Oral once PRN Blood Glucose LESS THAN 70 milliGRAM(s)/deciliter  glucagon  Injectable 1 milliGRAM(s) IntraMuscular once PRN Glucose LESS THAN 70 milligrams/deciliter  lidocaine 2% Gel 1 Application(s) Topical every 3 hours PRN pain      Allergies    codeine (Anaphylaxis)    Intolerances        Review of Systems:    General:  No wt loss, fevers, chills, night sweats, fatigue   Eyes:  Good vision, no reported pain  ENT:  No sore throat, pain, runny nose, dysphagia  CV:  No pain, palpitations, hypo/hypertension  Resp:  No dyspnea, cough, tachypnea, wheezing  GI:  No pain, No nausea, No vomiting, No diarrhea, No constipation, No weight loss, No fever, No pruritis, No rectal bleeding, No melena, No dysphagia  :  No pain, bleeding, incontinence, nocturia  Muscle:  No pain, weakness  Neuro:  No weakness, tingling, memory problems  Psych:  No fatigue, insomnia, mood problems, depression  Endocrine:  No polyuria, polydypsia, cold/heat intolerance  Heme:  No petechiae, ecchymosis, easy bruisability  Skin:  No rash, tattoos, scars, edema      Vital Signs Last 24 Hrs  T(C): 36.9 (04 Dec 2018 12:00), Max: 37.1 (04 Dec 2018 07:58)  T(F): 98.4 (04 Dec 2018 12:00), Max: 98.7 (04 Dec 2018 07:58)  HR: 100 (04 Dec 2018 12:00) (99 - 146)  BP: 116/57 (04 Dec 2018 11:00) (93/52 - 182/75)  BP(mean): 82 (04 Dec 2018 11:00) (68 - 108)  RR: 17 (04 Dec 2018 12:00) (12 - 31)  SpO2: 93% (04 Dec 2018 12:00) (92% - 100%)    PHYSICAL EXAM:    Constitutional: NAD oob in chair  HEENT: ncat  Neck: No LAD,  Respiratory: dec bs  Cardiovascular: S1 and S2, RRR  Gastrointestinal: obese abd soft nt mild dt  Extremities: No peripheral edema  Vascular: 2+ peripheral pulses  Neurological: awake alert responds appropriately but lethargic  Skin: mild jaundice      LABS:                        7.8    9.47  )-----------( 98       ( 04 Dec 2018 06:12 )             22.5     12-04    140  |  105  |  40<H>  ----------------------------<  149<H>  3.9   |  28  |  1.00    Ca    7.3<L>      04 Dec 2018 06:12  Phos  1.9     12-04  Mg     1.7     12-04    TPro  4.4<L>  /  Alb  2.2<L>  /  TBili  3.6<H>  /  DBili  x   /  AST  33  /  ALT  21  /  AlkPhos  73  12-04    PT/INR - ( 04 Dec 2018 06:12 )   PT: 18.6 sec;   INR: 1.61 ratio         PTT - ( 04 Dec 2018 06:12 )  PTT:27.7 sec      RADIOLOGY & ADDITIONAL TESTS:

## 2018-12-04 NOTE — PROGRESS NOTE ADULT - ASSESSMENT
[ASSESSMENT and  PLAN]  63 yo man w JEAN/NAFLD assoc cirrhosis, baseline bilirubin 2.7, adm after near syncope and found  anemia with GI bleed due to large duodenal ulcer; s/p EGD and injection of ulcer with epinephrine  Also history hx of coagulopathy due to cirrhosis and baseline thrombocytopenia with cirrhosis (platelet count usually 90K).     -CBC stable  -B12/folate/iron  adequate  -stable mild incr INR due to liver disease  -symptomatic management from Heme standpoint, continue monitor serial CBC

## 2018-12-04 NOTE — PROGRESS NOTE ADULT - SUBJECTIVE AND OBJECTIVE BOX
Patient is a 62y old  Male who presents with a chief complaint of vomiting and near syncope (04 Dec 2018 13:47)    24 hour events: ***  PAST MEDICAL & SURGICAL HISTORY:  GERD with esophagitis: Gastritis &amp; Non Bleeding Ulcers  Hepatic encephalopathy  Obesity  Fatty liver disease, nonalcoholic  Renal stones: 25 years ago  Hypertension  Neuropathy  Hypercholesteremia  Diabetes  S/P cholecystectomy      Review of Systems:  Constitutional: No fever, chills, fatigue  Neuro: No headache, numbness, weakness  Resp: No cough, wheezing, shortness of breath  CVS: No chest pain, palpitations, leg swelling  GI: No abdominal pain, nausea, vomiting, diarrhea   : No dysuria, frequency, incontinence  Skin: No itching, burning, rashes, or lesions   Msk: No joint pain or swelling  Psych: No depression, anxiety, mood swings    T(F): 98.7 (12-04-18 @ 20:00), Max: 98.7 (12-04-18 @ 07:58)  HR: 94 (12-04-18 @ 22:00) (94 - 112)  BP: 118/59 (12-04-18 @ 22:00) (103/55 - 182/75)  RR: 18 (12-04-18 @ 22:00) (12 - 31)  SpO2: 95% (12-04-18 @ 22:00) (92% - 99%)  Wt(kg): --        CAPILLARY BLOOD GLUCOSE      POCT Blood Glucose.: 166 mg/dL (04 Dec 2018 21:05)      I&O's Summary    03 Dec 2018 07:01  -  04 Dec 2018 07:00  --------------------------------------------------------  IN: 4713 mL / OUT: 1825 mL / NET: 2888 mL    04 Dec 2018 07:01  -  04 Dec 2018 23:35  --------------------------------------------------------  IN: 1775 mL / OUT: 3200 mL / NET: -1425 mL        Physical Exam:     Gen:   Neuro: A&Ox3, non-focal  HEENT: NC/AT  Resp: CTA b/l  CVS: nl S1/S2, RRR  Abd: soft, nt, nd, +bs  Ext: no edema, +pulses  Skin: well perfused, warm    Meds:  rifaximin Oral    losartan Oral    atorvastatin Oral  dextrose 40% Gel Oral PRN  dextrose 50% Injectable IV Push  dextrose 50% Injectable IV Push  dextrose 50% Injectable IV Push  glucagon  Injectable IntraMuscular PRN  insulin glargine Injectable (LANTUS) SubCutaneous  insulin lispro (HumaLOG) corrective regimen sliding scale SubCutaneous      gabapentin Oral        lactulose Syrup Oral  pantoprazole Infusion IV Continuous  sucralfate Oral  ursodiol Capsule Oral      dextrose 5%. IV Continuous      lidocaine 2% Gel Topical PRN                              7.9    9.87  )-----------( 96       ( 04 Dec 2018 15:08 )             22.4       12-04    140  |  105  |  40<H>  ----------------------------<  149<H>  3.9   |  28  |  1.00    Ca    7.3<L>      04 Dec 2018 06:12  Phos  1.9     12-04  Mg     1.7     12-04    TPro  4.4<L>  /  Alb  2.2<L>  /  TBili  3.6<H>  /  DBili  x   /  AST  33  /  ALT  21  /  AlkPhos  73  12-04          PT/INR - ( 04 Dec 2018 06:12 )   PT: 18.6 sec;   INR: 1.61 ratio         PTT - ( 04 Dec 2018 06:12 )  PTT:27.7 sec    .Blood Blood   No growth to date. -- 11-30 @ 20:55  .Urine Clean Catch (Midstream)   <10,000 CFU/ml Normal Urogenital ninfa present -- 11-30 @ 20:19      Rapid RVP Result: NotDetec (11-30 @ 20:45)      Radiology: ***    Bedside lung ultrasound: ***    Bedside ECHO: ***    CENTRAL LINE: Y/N          DATE INSERTED:              REMOVE: Y/N    HAYES: Y/N                        DATE INSERTED:              REMOVE: Y/N    A-LINE: Y/N                       DATE INSERTED:              REMOVE: Y/N    GLOBAL ISSUE/BEST PRACTICE:  Analgesia:  Sedation:  HOB elevation: yes  Stress ulcer prophylaxis:  VTE prophylaxis:  Glycemic control:  Nutrition:      CODE STATUS: ***  GOC discussion: Y  Critical care time spent (mins): ***  (Reviewing data, imaging, discussing with multidisciplinary team, non inclusive of procedures, discussing goals of care with patient/family) 62M with PMHx of DM, HTN, HLD, obesity, nephrolithiasis (last stone 25 years ago), neuropathy, HE, NAFLD, Hyper ammonemia, recent Gastritis/ esophagitis on EGD & Gram variable teddy bacteremia in 10/2018, initially admitted with near syncope and vomiting. Hospital course significant for ABLA due to UGIB. EGD showed large duodenal ulcer with clot s/p clip x4, epi and cautery. Pt transfused 10 units prbc, 2 plts, 4 ffps. Pt also with MISA, lactic acidosis, uncontrolled hyperglycemia, and coagulopathy due to underlying JEAN    24 hour events: Pt seen and examined at bedside. Chart/labs reviewed.     PAST MEDICAL & SURGICAL HISTORY:  GERD with esophagitis: Gastritis &amp; Non Bleeding Ulcers  Hepatic encephalopathy  Obesity  Fatty liver disease, nonalcoholic  Renal stones: 25 years ago  Hypertension  Neuropathy  Hypercholesteremia  Diabetes  S/P cholecystectomy      Review of Systems:  Constitutional: No fever, chills, fatigue  Neuro: No headache, numbness, weakness  Resp: No cough, wheezing, shortness of breath  CVS: No chest pain, palpitations, leg swelling  GI: No abdominal pain, nausea, vomiting, diarrhea   : No dysuria, frequency, incontinence  Skin: No itching, burning, rashes, or lesions   Msk: No joint pain or swelling  Psych: No depression, anxiety, mood swings    T(F): 98.7 (12-04-18 @ 20:00), Max: 98.7 (12-04-18 @ 07:58)  HR: 94 (12-04-18 @ 22:00) (94 - 112)  BP: 118/59 (12-04-18 @ 22:00) (103/55 - 182/75)  RR: 18 (12-04-18 @ 22:00) (12 - 31)  SpO2: 95% (12-04-18 @ 22:00) (92% - 99%)  Wt(kg): --        CAPILLARY BLOOD GLUCOSE      POCT Blood Glucose.: 166 mg/dL (04 Dec 2018 21:05)      I&O's Summary    03 Dec 2018 07:01  -  04 Dec 2018 07:00  --------------------------------------------------------  IN: 4713 mL / OUT: 1825 mL / NET: 2888 mL    04 Dec 2018 07:01  -  04 Dec 2018 23:35  --------------------------------------------------------  IN: 1775 mL / OUT: 3200 mL / NET: -1425 mL        Physical Exam:     Gen: obese male  Neuro: A&Ox3, non-focal  HEENT: NC/AT  Resp: CTA b/l  CVS: nl S1/S2, RRR  Abd: soft, nt, nd, +bs  Ext: no edema, +pulses  Skin: well perfused, warm      Meds:    rifaximin Oral  losartan Oral  atorvastatin Oral  insulin glargine Injectable (LANTUS) SubCutaneous  insulin lispro (HumaLOG) corrective regimen sliding scale SubCutaneous  gabapentin Oral  lactulose Syrup Oral  pantoprazole Infusion IV Continuous  sucralfate Oral  ursodiol Capsule Oral  dextrose 5%. IV Continuous  lidocaine 2% Gel Topical PRN                            7.9    9.87  )-----------( 96       ( 04 Dec 2018 15:08 )             22.4       12-04    140  |  105  |  40<H>  ----------------------------<  149<H>  3.9   |  28  |  1.00    Ca    7.3<L>      04 Dec 2018 06:12  Phos  1.9     12-04  Mg     1.7     12-04    TPro  4.4<L>  /  Alb  2.2<L>  /  TBili  3.6<H>  /  DBili  x   /  AST  33  /  ALT  21  /  AlkPhos  73  12-04          PT/INR - ( 04 Dec 2018 06:12 )   PT: 18.6 sec;   INR: 1.61 ratio         PTT - ( 04 Dec 2018 06:12 )  PTT:27.7 sec    .Blood Blood   No growth to date. -- 11-30 @ 20:55  .Urine Clean Catch (Midstream)   <10,000 CFU/ml Normal Urogenital ninfa present -- 11-30 @ 20:19      Rapid RVP Result: NotDetec (11-30 @ 20:45)        CENTRAL LINE: Yes    HAYES: Yes    A-LINE: No    GLOBAL ISSUE/BEST PRACTICE:  Analgesia: no  Sedation: no  HOB elevation: yes  Stress ulcer prophylaxis: yes  VTE prophylaxis: scd  Glycemic control: yes  Nutrition: npo      CODE STATUS: Full  GOC discussion: Y

## 2018-12-04 NOTE — PROGRESS NOTE ADULT - PROBLEM SELECTOR PLAN 1
ugib  sp egd- large du with clot, sp epi w hemostasis  sp repeat egd, 4 clips placed, injected w epi and cauterized   no further bleeding overnight per nursing  serial cbc, transfuse prn  hold ac asa nsaids  cont ppi gtt and carafate  surgery following  ivf/clears  monitor abd exam  if rebleeds may need surgery/IR  cont icu monitoring  will follow
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
## 2018-12-04 NOTE — PROGRESS NOTE ADULT - PROBLEM SELECTOR PLAN 5
poss sec to vasovagal ? LOC- unclear, Hypotension 2/2 likely GI Bleed  -HOLD Lasix & ARB  - CT Head neg , CT Neck -No Fx , S/P  IV Fluid bolus x 4 Lit in ER   - EKG - normal sinus rhythm;   - Neurology Dr Peña on case   - fall risk precautions, OOB to chair ? compliant with insulin; Pt uses insulin samples due to high cost  -A1C -improved, still elevated  -Hypoglycemia Protocol, Low Dose Insulin Sliding Coverage, Lantus 22 units qhs, Accu-Cheks AC&HS.  -Diet: clear liquid diet  -Follow up HbA1c.  -Hold home medications: Humalog 20 breakfast & lunch, Humalog 30 dinner-pre meals ,  toujeo 50 u SC at night ? compliant with insulin; Pt uses insulin samples due to high cost  -A1C -improved, still elevated  -Hypoglycemia Protocol, Low Dose Insulin Sliding Coverage, Lantus 50 units qhs, Accu-Cheks AC&HS.  -Diet: clear liquid diet  -Follow up HbA1c.  -Hold home medications: Humalog 20 breakfast & lunch, Humalog 30 dinner-pre meals ,  toujeo 50 u SC at night

## 2018-12-04 NOTE — CHART NOTE - NSCHARTNOTEFT_GEN_A_CORE
Procedure Note    Patient seen and examined at bedside for removal of central line (RIJ). Area prepped and cleaned with alcohol swabs. Suture removal kit used to remove sutures holding central line in place. Patient then placed in reverse Trendelenburg, asked to hold breath/hum, and then central line removed with no resistance. Pressure applied for 5-10 minutes with gauze. No signs of active bleeding noted. Tegaderm and gauze/tape used to create pressure dressing to maintain pressure on central line site. Patient tolerated well without complications. Will continue to follow. RN to call if any changes.

## 2018-12-04 NOTE — PROGRESS NOTE ADULT - PROBLEM SELECTOR PLAN 6
? compliant with insulin; Pt uses insulin samples due to high cost  -A1C -improved, still elevated  -Hypoglycemia Protocol, Low Dose Insulin Sliding Coverage, Lantus 22 units qhs, Accu-Cheks AC&HS.  -Diet: NPO  -Follow up HbA1c.  -Hold home medications: Humalog 20 breakfast & lunch, Humalog 30 dinner-pre meals ,  toujeo 50 u SC at night ? compliant with insulin; Pt uses insulin samples due to high cost  -A1C -improved, still elevated  -Hypoglycemia Protocol, Low Dose Insulin Sliding Coverage, Lantus 22 units qhs, Accu-Cheks AC&HS.  -Diet: Ice chips  -Follow up HbA1c.  -Hold home medications: Humalog 20 breakfast & lunch, Humalog 30 dinner-pre meals ,  toujeo 50 u SC at night ? compliant with insulin; Pt uses insulin samples due to high cost  -A1C -improved, still elevated  -Hypoglycemia Protocol, Low Dose Insulin Sliding Coverage, Lantus 22 units qhs, Accu-Cheks AC&HS.  -Diet: clear liquid diet  -Follow up HbA1c.  -Hold home medications: Humalog 20 breakfast & lunch, Humalog 30 dinner-pre meals ,  toujeo 50 u SC at night 2/2 Cirrhosis -ammonia level improved ? 2/2 GI Bleed ; Pt not confused at baseline MS, no asterixis  -- High Ammonia level improved to 50 and then increased to 80  on lactulose at home (not on rifaximin at home)   rifaximin 550 BID

## 2018-12-04 NOTE — PROGRESS NOTE ADULT - PROBLEM SELECTOR PLAN 2
- ? Etiology , Repeat level improving,  NOW leukocytosis at 15.08,  pt is afebrile  - Pt had increased Lactate level from ER which had improved to 2.5;   -Leukocytosis -Reactive likely , CBC in AM    Likely sec to re starting Metformin / hypotension   -  RVP- neg  - Blood cx- negative  - UCx negative   -NEG S/P IV Fluids   -Serial lactate levels - ? Etiology , Repeat level improving,  NOW leukocytosis at 9.47,  pt is afebrile  - Pt had increased Lactate level from ER which had improved to 2.5;   -Leukocytosis -Reactive likely  - Likely sec to re starting Metformin / hypotension   -  RVP- neg  - Blood cx- negative  - UCx negative   -NEG S/P IV Fluids   -Serial lactate levels - ? Etiology , Repeat level improving,  NOW leukocytosis at 9.47,  pt is afebrile  - Pt had increased Lactate level from ER which had improved   -Leukocytosis -Reactive likely- improved.   - Likely sec to re starting Metformin / hypotension   -  RVP- neg  - Blood cx- negative  - UCx negative   -NEG S/P IV Fluids Anemia & Thrombocytopenia  Ac Blood loss Anemia, 2/2 Bleeding DU  S/P EGD x 2   -Pt had Multiple pRBC transfusions , FFP & Platelets transfusions.  Low Platelets 2/2 Liver disease/Cirrhosis  Dr Velázquez to follow up.

## 2018-12-05 ENCOUNTER — TRANSCRIPTION ENCOUNTER (OUTPATIENT)
Age: 63
End: 2018-12-05

## 2018-12-05 LAB
ALBUMIN SERPL ELPH-MCNC: 1.8 G/DL — LOW (ref 3.3–5)
ALP SERPL-CCNC: 72 U/L — SIGNIFICANT CHANGE UP (ref 40–120)
ALT FLD-CCNC: 19 U/L — SIGNIFICANT CHANGE UP (ref 12–78)
AMMONIA BLD-MCNC: 54 UMOL/L — HIGH (ref 11–32)
ANION GAP SERPL CALC-SCNC: 7 MMOL/L — SIGNIFICANT CHANGE UP (ref 5–17)
AST SERPL-CCNC: 36 U/L — SIGNIFICANT CHANGE UP (ref 15–37)
BASOPHILS # BLD AUTO: 0.03 K/UL — SIGNIFICANT CHANGE UP (ref 0–0.2)
BASOPHILS NFR BLD AUTO: 0.3 % — SIGNIFICANT CHANGE UP (ref 0–2)
BILIRUB SERPL-MCNC: 2.9 MG/DL — HIGH (ref 0.2–1.2)
BUN SERPL-MCNC: 29 MG/DL — HIGH (ref 7–23)
CALCIUM SERPL-MCNC: 7.4 MG/DL — LOW (ref 8.5–10.1)
CHLORIDE SERPL-SCNC: 104 MMOL/L — SIGNIFICANT CHANGE UP (ref 96–108)
CO2 SERPL-SCNC: 29 MMOL/L — SIGNIFICANT CHANGE UP (ref 22–31)
CREAT SERPL-MCNC: 0.98 MG/DL — SIGNIFICANT CHANGE UP (ref 0.5–1.3)
CULTURE RESULTS: SIGNIFICANT CHANGE UP
CULTURE RESULTS: SIGNIFICANT CHANGE UP
EOSINOPHIL # BLD AUTO: 0.19 K/UL — SIGNIFICANT CHANGE UP (ref 0–0.5)
EOSINOPHIL NFR BLD AUTO: 2 % — SIGNIFICANT CHANGE UP (ref 0–6)
FIBRINOGEN AG PPP IA-MCNC: 151 MG/DL — LOW (ref 180–350)
GLUCOSE SERPL-MCNC: 65 MG/DL — LOW (ref 70–99)
HCT VFR BLD CALC: 22.5 % — LOW (ref 39–50)
HGB BLD-MCNC: 7.8 G/DL — LOW (ref 13–17)
IMM GRANULOCYTES NFR BLD AUTO: 1.4 % — SIGNIFICANT CHANGE UP (ref 0–1.5)
LYMPHOCYTES # BLD AUTO: 1.98 K/UL — SIGNIFICANT CHANGE UP (ref 1–3.3)
LYMPHOCYTES # BLD AUTO: 21.2 % — SIGNIFICANT CHANGE UP (ref 13–44)
MAGNESIUM SERPL-MCNC: 1.9 MG/DL — SIGNIFICANT CHANGE UP (ref 1.6–2.6)
MCHC RBC-ENTMCNC: 31 PG — SIGNIFICANT CHANGE UP (ref 27–34)
MCHC RBC-ENTMCNC: 34.7 GM/DL — SIGNIFICANT CHANGE UP (ref 32–36)
MCV RBC AUTO: 89.3 FL — SIGNIFICANT CHANGE UP (ref 80–100)
MONOCYTES # BLD AUTO: 0.91 K/UL — HIGH (ref 0–0.9)
MONOCYTES NFR BLD AUTO: 9.7 % — SIGNIFICANT CHANGE UP (ref 2–14)
NEUTROPHILS # BLD AUTO: 6.11 K/UL — SIGNIFICANT CHANGE UP (ref 1.8–7.4)
NEUTROPHILS NFR BLD AUTO: 65.4 % — SIGNIFICANT CHANGE UP (ref 43–77)
PHOSPHATE SERPL-MCNC: 2.1 MG/DL — LOW (ref 2.5–4.5)
PLATELET # BLD AUTO: 106 K/UL — LOW (ref 150–400)
POTASSIUM SERPL-MCNC: 3.4 MMOL/L — LOW (ref 3.5–5.3)
POTASSIUM SERPL-SCNC: 3.4 MMOL/L — LOW (ref 3.5–5.3)
PROT SERPL-MCNC: 4.1 G/DL — LOW (ref 6–8.3)
RBC # BLD: 2.52 M/UL — LOW (ref 4.2–5.8)
RBC # FLD: 18.2 % — HIGH (ref 10.3–14.5)
SODIUM SERPL-SCNC: 140 MMOL/L — SIGNIFICANT CHANGE UP (ref 135–145)
SPECIMEN SOURCE: SIGNIFICANT CHANGE UP
SPECIMEN SOURCE: SIGNIFICANT CHANGE UP
WBC # BLD: 9.35 K/UL — SIGNIFICANT CHANGE UP (ref 3.8–10.5)
WBC # FLD AUTO: 9.35 K/UL — SIGNIFICANT CHANGE UP (ref 3.8–10.5)

## 2018-12-05 PROCEDURE — 99233 SBSQ HOSP IP/OBS HIGH 50: CPT | Mod: GC

## 2018-12-05 RX ORDER — POTASSIUM CHLORIDE 20 MEQ
40 PACKET (EA) ORAL ONCE
Qty: 0 | Refills: 0 | Status: COMPLETED | OUTPATIENT
Start: 2018-12-05 | End: 2018-12-05

## 2018-12-05 RX ORDER — PANTOPRAZOLE SODIUM 20 MG/1
40 TABLET, DELAYED RELEASE ORAL
Qty: 0 | Refills: 0 | Status: DISCONTINUED | OUTPATIENT
Start: 2018-12-05 | End: 2018-12-11

## 2018-12-05 RX ORDER — POTASSIUM PHOSPHATE, MONOBASIC POTASSIUM PHOSPHATE, DIBASIC 236; 224 MG/ML; MG/ML
15 INJECTION, SOLUTION INTRAVENOUS ONCE
Qty: 0 | Refills: 0 | Status: COMPLETED | OUTPATIENT
Start: 2018-12-05 | End: 2018-12-05

## 2018-12-05 RX ORDER — IBUPROFEN 200 MG
400 TABLET ORAL ONCE
Qty: 0 | Refills: 0 | Status: COMPLETED | OUTPATIENT
Start: 2018-12-05 | End: 2018-12-05

## 2018-12-05 RX ADMIN — URSODIOL 300 MILLIGRAM(S): 250 TABLET, FILM COATED ORAL at 05:50

## 2018-12-05 RX ADMIN — Medication 1 GRAM(S): at 18:25

## 2018-12-05 RX ADMIN — GABAPENTIN 200 MILLIGRAM(S): 400 CAPSULE ORAL at 05:50

## 2018-12-05 RX ADMIN — Medication 1: at 22:23

## 2018-12-05 RX ADMIN — LACTULOSE 15 GRAM(S): 10 SOLUTION ORAL at 18:26

## 2018-12-05 RX ADMIN — LACTULOSE 15 GRAM(S): 10 SOLUTION ORAL at 05:49

## 2018-12-05 RX ADMIN — INSULIN GLARGINE 50 UNIT(S): 100 INJECTION, SOLUTION SUBCUTANEOUS at 22:24

## 2018-12-05 RX ADMIN — GABAPENTIN 200 MILLIGRAM(S): 400 CAPSULE ORAL at 18:25

## 2018-12-05 RX ADMIN — Medication 40 MILLIEQUIVALENT(S): at 08:53

## 2018-12-05 RX ADMIN — URSODIOL 300 MILLIGRAM(S): 250 TABLET, FILM COATED ORAL at 22:25

## 2018-12-05 RX ADMIN — Medication 400 MILLIGRAM(S): at 23:04

## 2018-12-05 RX ADMIN — Medication 1: at 18:31

## 2018-12-05 RX ADMIN — Medication 1 GRAM(S): at 12:05

## 2018-12-05 RX ADMIN — GABAPENTIN 200 MILLIGRAM(S): 400 CAPSULE ORAL at 23:04

## 2018-12-05 RX ADMIN — PANTOPRAZOLE SODIUM 40 MILLIGRAM(S): 20 TABLET, DELAYED RELEASE ORAL at 18:25

## 2018-12-05 RX ADMIN — LOSARTAN POTASSIUM 100 MILLIGRAM(S): 100 TABLET, FILM COATED ORAL at 05:50

## 2018-12-05 RX ADMIN — POTASSIUM PHOSPHATE, MONOBASIC POTASSIUM PHOSPHATE, DIBASIC 62.5 MILLIMOLE(S): 236; 224 INJECTION, SOLUTION INTRAVENOUS at 08:53

## 2018-12-05 RX ADMIN — URSODIOL 300 MILLIGRAM(S): 250 TABLET, FILM COATED ORAL at 18:25

## 2018-12-05 RX ADMIN — GABAPENTIN 200 MILLIGRAM(S): 400 CAPSULE ORAL at 12:05

## 2018-12-05 RX ADMIN — Medication 1 GRAM(S): at 23:04

## 2018-12-05 RX ADMIN — ATORVASTATIN CALCIUM 10 MILLIGRAM(S): 80 TABLET, FILM COATED ORAL at 22:25

## 2018-12-05 RX ADMIN — Medication 1 GRAM(S): at 05:50

## 2018-12-05 NOTE — DISCHARGE NOTE ADULT - MEDICATION SUMMARY - MEDICATIONS TO TAKE
I will START or STAY ON the medications listed below when I get home from the hospital:    BD ultra fine Angela needles  -- 1  subcutaneous once a day   -- Indication: For DM    Bariatric Rolling walker  -- Indication: For Walker    aluminum hydroxide-magnesium hydroxide 200 mg-200 mg/5 mL oral suspension  -- 30 milliliter(s) by mouth 2 times a day  -- Indication: For Dyspepsia    phytonadione 100 mcg oral tablet  -- 1 tab(s) by mouth once a day  -- Indication: For Vit    gabapentin 100 mg oral capsule  -- 2 cap(s) by mouth 4 times a day  -- Indication: For Back pain    insulin glargine  -- 50 unit(s) subcutaneous once a day (at bedtime) pt has pen  -- Indication: For Diabetes    pravastatin 40 mg oral tablet  -- 1 tab(s) by mouth once a day  -- Indication: For Dyslipidemia    ertapenem 1 g injection  --  injectable stop date December 18th.  -- Indication: For ESBL E COLIBacteremia    ursodiol 300 mg oral capsule  -- 1 cap(s) by mouth 3 times a day  -- Indication: For Hyperbilirubinemia    lactulose 10 g/15 mL oral syrup  -- 15 milliliter(s) by mouth 3 times a day  -- Indication: For Hepatic encephalopathy    potassium chloride 10 mEq oral tablet, extended release  -- 1 tab(s) by mouth once a day  -- Indication: For K  supplements with Lasix    rifAXIMin 550 mg oral tablet  -- 1 tab(s) by mouth 2 times a day  -- Indication: For cirrhosis    Carafate 1 g oral tablet  -- 1 tab(s) by mouth every 6 hours  -- Indication: For GIB (gastrointestinal bleeding)Bleeding Duodenal Ulcer    Bacid (LAC) oral tablet  -- 1 tab(s) by mouth 2 times a day for  2weeks  -- Indication: For Probiotics    pantoprazole 40 mg oral delayed release tablet  -- 1 tab(s) by mouth 2 times a day  -- Indication: For Bleeding Duodenal Ulcer    Vitamin D3 2000 intl units oral tablet  -- 1 tab(s) by mouth once a day  -- Indication: For Vit I will START or STAY ON the medications listed below when I get home from the hospital:    BD ultra fine Angela needles  -- 1  subcutaneous once a day   -- Indication: For DM    Bariatric Rolling walker  -- Indication: For Walker    aluminum hydroxide-magnesium hydroxide 200 mg-200 mg/5 mL oral suspension  -- 30 milliliter(s) by mouth 2 times a day  -- Indication: For Dyspepsia    phytonadione 100 mcg oral tablet  -- 1 tab(s) by mouth once a day  -- Indication: For Vit    gabapentin 100 mg oral capsule  -- 2 cap(s) by mouth 4 times a day  -- Indication: For Back pain    insulin glargine  -- 50 unit(s) subcutaneous once a day (at bedtime) pt has pen  -- Indication: For Diabetes    pravastatin 40 mg oral tablet  -- 1 tab(s) by mouth once a day  -- Indication: For Dyslipidemia    ertapenem 1 g injection  --  injectable stop date December 18th.  -- Indication: For ESBL E COLIBacteremia    furosemide 20 mg oral tablet  -- 1 tab(s) by mouth once a day  for lower extremity edema  -- Indication: For Edema extremities    ursodiol 300 mg oral capsule  -- 1 cap(s) by mouth 3 times a day  -- Indication: For Hyperbilirubinemia    lactulose 10 g/15 mL oral syrup  -- 15 milliliter(s) by mouth 3 times a day  -- Indication: For Hepatic encephalopathy    potassium chloride 10 mEq oral tablet, extended release  -- 1 tab(s) by mouth once a day  -- Indication: For K  supplements with Lasix    rifAXIMin 550 mg oral tablet  -- 1 tab(s) by mouth 2 times a day  -- Indication: For cirrhosis    Carafate 1 g oral tablet  -- 1 tab(s) by mouth every 6 hours  -- Indication: For GIB (gastrointestinal bleeding)Bleeding Duodenal Ulcer    Bacid (LAC) oral tablet  -- 1 tab(s) by mouth 2 times a day for  2weeks  -- Indication: For Probiotics    pantoprazole 40 mg oral delayed release tablet  -- 1 tab(s) by mouth 2 times a day  -- Indication: For Bleeding Duodenal Ulcer    Vitamin D3 2000 intl units oral tablet  -- 1 tab(s) by mouth once a day  -- Indication: For Vit

## 2018-12-05 NOTE — DISCHARGE NOTE ADULT - PATIENT PORTAL LINK FT
You can access the [facility] Patient Portal, offered by [facility], by registering with the following website: [email or web address]

## 2018-12-05 NOTE — PROGRESS NOTE ADULT - PROBLEM SELECTOR PLAN 4
- ? Etiology , Repeat level improving,  NOW leukocytosis at 9.47,  pt is afebrile  - Pt had increased Lactate level from ER which had improved   -Leukocytosis -Reactive likely- improved.   - Likely sec to re starting Metformin / hypotension   -  RVP- neg  - Blood cx- negative  - UCx negative   -NEG S/P IV Fluids - ? Etiology , Repeat level improving,  -  S/P leukocytosis, resolved , WBC today 9.47,  pt is afebrile  - Pt had increased Lactate level from ER which had improved   -Leukocytosis -Reactive likely- improved.   - Likely sec to re starting Metformin / hypotension   -  RVP- neg  - Blood cx- negative  - UCx negative   -NEG S/P IV Fluids
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
## 2018-12-05 NOTE — PROGRESS NOTE ADULT - PROBLEM SELECTOR PLAN 2
Anemia & Thrombocytopenia  Ac Blood loss Anemia, 2/2 Bleeding DU  S/P EGD x 2   -Pt had Multiple pRBC transfusions , FFP & Platelets transfusions.  Low Platelets 2/2 Liver disease/Cirrhosis  Dr Velázquez to follow up. Anemia & Thrombocytopenia  Ac Blood loss Anemia, 2/2 Bleeding DU  S/P EGD x 2 by GI  -Pt had Multiple pRBC transfusions , FFP & Platelets transfusions.  Dr Duran's group to follow pt

## 2018-12-05 NOTE — DISCHARGE NOTE ADULT - MEDICATION SUMMARY - MEDICATIONS TO CHANGE
I will SWITCH the dose or number of times a day I take the medications listed below when I get home from the hospital:    gabapentin 800 mg oral tablet  -- 1 tab(s) by mouth 4 times a day

## 2018-12-05 NOTE — PROGRESS NOTE ADULT - SUBJECTIVE AND OBJECTIVE BOX
Patient is a 62y old  Male who presents with a chief complaint of vomiting and near syncope, upper GI bleeding (02 Dec 2018 13:00)      INTERVAL HPI:  Pt is a 61 y/o M with PMHx of Type 2 DM, HTN, HLD, obesity, nephrolithiasis (last stone 25 years ago), neuropathy, HE, NAFLD, Hyper ammonemia, recent Gastritis/ esophagitis on EGD & Gram variable teddy bacteremia in 10/2018  who presents with vomiting and  near syncope/falling out of a moving car. Pt [place] and [place]", his vision was cloudy, he pulled over the car and stopped and vomited. Vomitus was "creamy white." Pt denies seeing blood in vomitus. His dtr then took over driving and Pt again felt like he needed to vomit and told dtr to pull over. He thought the car had stopped and he opened the door and fell out of the moving car. He laid on the ground for ~ 15 minutes. Pt says he possibly lost consciousness. States when he was lying on the ground felt very cold. Pt has had "terrible heartburn" for 2 days and felt chills for 2 days. Pt denies abdominal pain, CP, palp, SOB, cough, myalgias, rash, HA. Pt admits to chills, vomiting (4 episodes), diarrhea (from lactulose), dizziness, pedal edema, tingling in hands (chronic, intermittent). Pt denies recent travel, sick contacts, different eating patterns.    Pt states that early this week pt saw her Endocrinologist & started back on Metformin 500 mg daily as per Dr's recommendation.  In ED Pt's vitals were: T(C): 36.3, HR: 91, BP: 85/59, RR: 15, SpO2: 98% on RA  Labs significant for lactate 5.6, , Hgb 10.4, total bili 3.7, alk phos 183, ammonia 72, Mg 1.4. UA - trace LE, mod blood, RBC 6-10, urine glucose 1,000.   CXR - negative chest  Abd Xray - There is mild content in the colon and no overt sense of bowel obstruction.  CT head - no intracranial hemorrhage or mass effect  CT cervical - Multilevel degenerative disc disease/cervical spondylosis, with posterior osteophyte disc complex C6-7 resulting in flattening of the cervical cord. If there is a clinical concern for spinal cord injury, recommend further evaluation with MRI if there are no clinical contraindications.  EKG - NSR, 93bpm    Pt given 2L NS bolus, Zofran pantoprazole, famotidine, Carafate , Pt seen by GI Dr Morales in ER & was admitted for further Eval.    18: Pt seen, examined, Had total 4 bloody BM so far , Lactate improved, pt had CT A/P with IV Contrast. Drop in H/H. plan for, start Protonix drip,  2 u pRBC 1 u FFP & 5 mg Vit K x 1 as d/w Dr Franks -GI, Elevated ammonia level. Coagulopathy, Improving Lactate. Ac Blood loss Anemia.    18: Pt seen, Examined, pt was transferred to ICU on 18 for symptomatic upper GI Bleed, S/P 7  units pRBC in Total , 3 FFP, 1 platelets transfusion given, Pt had emergent EGD done that found Ac Bleeding Duodenal ulcer found. S/P Epi given. Homeostasis obtained, Pt seen By Sx Dr Hernandez. H/H Stable,  today with MISA, Coagulopathy , Leukocytosis & High Lactate level.  H/H Low stable with Ac Blood  Loss Anemia. Hematology Dr Rios called, as pt is followed by Dr Rios- H/O as out pt.    12/3/18: Patient seen and examined this morning at bedside. Patient denies any acute complaints this morning. Hgb drop from 8.2 --> 7.7. Leukocytosis present at 15.08. Lactate level drop from 3.2 --> 2.5    19: Patient seen and examined this morning. Patient was sitting out of bed in chair, awake, alert, and oriented x3. Hb dropped to 6.6.  Patient states he is feeling better since yesterday. Patient had EGD done yesterday that showed large bleeding Duodenal ulcer s/p clips x2, epi injection, and cautery. Patient was given 3 units of packed red blood cells yesterday. Hbg increased from 7.3 --> 7.8. WBC count drop from 12.53 --> 9.47. Patient tolerating ice chips and clear liquid diet. Patient denies fever, chills, headache, weakness chest pain, palpitations, sob, N/V, diarrhea, constipation, blood in stool, urinary symptoms. Patient admits to feeling fatigued. Has not had a BM today.     18: patient seen, examined at bedside, patient lying comfortably in bed. Hgb at 7.8. WBC 9.35, patient tolerating clear liquid diet. Patient denies fevers, chills, CP, palpitations, N/V/D. As per nurse, BM are still dark. Patient is asymptomatic at this time.     OVERNIGHT EVENTS: NONE    Home Medications:  Bacid (LAC) oral tablet: 1 tab(s) orally 2 times a day for  2weeks (2018 19:41)  HumaLOG 100 units/mL subcutaneous solution: 20 unit(s) subcutaneous 2 times a day(breakfast and lunch) (2018 19:41)  HumaLOG 100 units/mL subcutaneous solution: 30 unit(s) subcutaneous once a day(@dinner) (2018 19:41)  irbesartan 300 mg oral tablet: 1 tab(s) orally once a day (2018 19:41)  lactulose 10 g/15 mL oral syrup: 15 milliliter(s) orally 3 times a day (2018 19:41)  Lasix 20 mg oral tablet: 1 tab(s) orally once a day (2018 19:41)  pantoprazole 40 mg oral delayed release tablet: 1 tab(s) orally once a day (2018 19:41)  phytonadione 100 mcg oral tablet: 1 tab(s) orally once a day (2018 19:41)  potassium chloride 10 mEq oral tablet, extended release: 1 tab(s) orally once a day (2018 19:41)  pravastatin 40 mg oral tablet: 1 tab(s) orally once a day (2018 19:41)  Vitamin D3 2000 intl units oral tablet: 1 tab(s) orally once a day (2018 19:41)      MEDICATIONS  (STANDING):  dextrose 5%. 1000 milliLiter(s) (50 mL/Hr) IV Continuous <Continuous>  dextrose 50% Injectable 12.5 Gram(s) IV Push once  dextrose 50% Injectable 25 Gram(s) IV Push once  dextrose 50% Injectable 25 Gram(s) IV Push once  gabapentin 200 milliGRAM(s) Oral four times a day  insulin glargine Injectable (LANTUS) 22 Unit(s) SubCutaneous at bedtime  insulin lispro (HumaLOG) corrective regimen sliding scale   SubCutaneous every 6 hours  pantoprazole Infusion 8 mG/Hr (10 mL/Hr) IV Continuous <Continuous>  rifaximin 550 milliGRAM(s) Oral two times a day  sucralfate 1 Gram(s) Oral every 6 hours  ursodiol Capsule 300 milliGRAM(s) Oral three times a day    MEDICATIONS  (PRN):  dextrose 40% Gel 15 Gram(s) Oral once PRN Blood Glucose LESS THAN 70 milliGRAM(s)/deciliter  glucagon  Injectable 1 milliGRAM(s) IntraMuscular once PRN Glucose LESS THAN 70 milligrams/deciliter      Allergies    codeine (Anaphylaxis)    Intolerances    REVIEW OF SYSTEMS:   CONSTITUTIONAL: No fever, No chills No myalgia, No Body ache, No Weakness. Admits to fatigue.  EYES: No eye pain,  No visual disturbances, No discharge, NO Redness  ENMT:  No ear pain, No nose bleed, No vertigo; No sinus or throat pain, No Congestion  NECK: No pain, No stiffness  RESPIRATORY: No cough, wheezing, No hemoptysis, No shortness of breath  CARDIOVASCULAR: No chest pain, palpitations  GASTROINTESTINAL: No abdominal or epigastric pain. No nausea, No vomiting; No diarrhea or constipation. [  ] BM  GENITOURINARY: No dysuria, No frequency, No urgency, No hematuria, or incontinence  NEUROLOGICAL: No headaches, No dizziness, No numbness, No tingling, No tremors, No weakness  EXT: No Swelling, No Pain, No Edema  SKIN:  [ x ] No itching, burning, new rashes, or lesions   MUSCULOSKELETAL: No joint pain. No muscle pain, No back pain, No extremity pain  PSYCHIATRIC: No depression, anxiety, mood swings or difficulty sleeping at night  PAIN SCALE: [x  ] None  [  ] Other-  ROS Unable to obtain due to - [  ] Dementia  [  ] Lethargy  [  ] Sedated [  ] non verbal  REST OF REVIEW Of SYSTEM - [ x ] Normal     Vital Signs Last 24 Hrs  T(C): 37.3 (05 Dec 2018 08:37), Max: 37.3 (05 Dec 2018 08:37)  T(F): 99.1 (05 Dec 2018 08:37), Max: 99.1 (05 Dec 2018 08:37)  HR: 93 (05 Dec 2018 11:00) (93 - 104)  BP: 116/56 (05 Dec 2018 11:00) (102/51 - 138/63)  BP(mean): 80 (05 Dec 2018 11:00) (73 - 91)  RR: 14 (05 Dec 2018 11:00) (12 - 31)  SpO2: 97% (05 Dec 2018 11:00) (92% - 97%)     @ 07:  -   @ 07:00  --------------------------------------------------------  IN: 1914 mL / OUT: 1030 mL / NET: 884 mL     @ 07:01  -   @ 13:15  --------------------------------------------------------  IN: 50 mL / OUT: 220 mL / NET: -170 mL    PHYSICAL EXAM: Rt IJ TLC , Beasley cath  GENERAL:  [x  ] NAD , [ x ] well appearing, [  ] Agitated, [  ] Drowsy,  [  ] Lethargy, [  ] confused   HEAD:  [ x ] Normal, [  ] Other  EYES:  [ x ] EOMI, [x  ] PERRLA, [ x ] conjunctiva and sclera clear normal, [  ] Other,  [x  ] Pallor,[  ] Discharge  ENMT:  [x  ] Normal, [ x ] Moist mucous membranes, [ x ] Good dentition, [ x ] No Thrush  NECK:  [x  ] Supple, [ x ] No JVD, [x  ] Normal thyroid, [  ] Lymphadenopathy [  ] Other  CHEST/LUNG:  [x  ] Clear to auscultation bilaterally, [ x ] Breath Sounds equal B/L,  [x  ] No rales, [x  ] No rhonchi  [ x ]  No wheezing  HEART:  [x  ] irregular rate and regular rhythm, [ x ] tachycardia, [  ] Bradycardia,  [  ] irregular  [ x] No murmurs, No rubs, No gallops, [  ] PPM in place (Mfr:  )  ABDOMEN:  [x  ] Soft, [ x ] Nontender, [x  ] Nondistended, [x  ] No mass, [x  ] Bowel sounds present, [ x ] obese  NERVOUS SYSTEM:  [x  ] Alert & Oriented X3, [ x ] Nonfocal  [  ] Confusion  [  ] Encephalopathic [  ] Sedated [  ] Unable to assess, [  ] Other-  EXTREMITIES: [ x ] 2+ Peripheral Pulses, No clubbing, No cyanosis,  [  ] edema B/L lower EXT. [  ] PVD stasis skin changes B/L Lower EXT  LYMPH: No lymphadenopathy noted  SKIN:  [ x ] No new rashes or lesions, [  ] Pressure Ulcers, [  ] ecchymosis, [  ] Skin Tears, [  ] Other    DIET: clear liquid diet     LABS:                        7.8    9.35  )-----------( 106      ( 05 Dec 2018 05:59 )             22.5     12-05    140  |  104  |  29<H>  ----------------------------<  65<L>  3.4<L>   |  29  |  0.98    Ca    7.4<L>      05 Dec 2018 05:59  Phos  2.1     12-05  Mg     1.9     12-05    TPro  4.1<L>  /  Alb  1.8<L>  /  TBili  2.9<H>  /  DBili  x   /  AST  36  /  ALT  19  /  AlkPhos  72  12-05    LIVER FUNCTIONS - ( 05 Dec 2018 05:59 )  Alb: 1.8 g/dL / Pro: 4.1 g/dL / ALK PHOS: 72 U/L / ALT: 19 U/L / AST: 36 U/L / GGT: x           PT/INR - ( 04 Dec 2018 06:12 )   PT: 18.6 sec;   INR: 1.61 ratio         PTT - ( 04 Dec 2018 06:12 )  PTT:27.7 sec      Lactate, Blood (. @ 12:52)    Lactate, Blood: 4.2 mmol/L    Hemoglobin A1C, Whole Blood (18 @ 10:32)  Hemoglobin A1C, Whole Blood: 8.3: Method: Immunoassay    Urinalysis Basic - ( 2018 18:30 )    Color: Yellow / Appearance: Slightly Turbid / S.015 / pH: x  Gluc: x / Ketone: Trace  / Bili: Small / Urobili: Negative   Blood: x / Protein: 25 mg/dL / Nitrite: Negative   Leuk Esterase: Trace / RBC: 6-10 /HPF / WBC 3-5   Sq Epi: x / Non Sq Epi: Occasional / Bacteria: Occasional    Culture Results:   No growth to date. ( @ 20:55)  Culture Results:   No growth to date. ( @ 20:55)  Culture Results:   <10,000 CFU/ml Normal Urogenital ninfa present ( @ 20:19)    culture blood  -- .Blood Blood  @ 20:55    culture urine  --   @ 20:55  culture blood  -- .Urine Clean Catch (Midstream)  @ 20:19    culture urine  --   @ 20:19    CARDIAC MARKERS ( 02 Dec 2018 08:40 )  x     / x     / 504 U/L / x     / x      CARDIAC MARKERS ( 2018 23:51 )  .019 ng/mL / x     / 301 U/L / x     / 2.8 ng/mL  CARDIAC MARKERS ( 2018 16:25 )  .019 ng/mL / x     / x     / x     / x        Culture - Blood (collected 2018 20:55)  Source: .Blood Blood  Preliminary Report (01 Dec 2018 21:01):    No growth to date.    Culture - Blood (collected 2018 20:55)  Source: .Blood Blood  Preliminary Report (01 Dec 2018 21:01):    No growth to date.    Culture - Urine (collected 2018 20:19)  Source: .Urine Clean Catch (Midstream)  Final Report (01 Dec 2018 15:16):    <10,000 CFU/ml Normal Urogenital ninfa present    Lipase, Serum: 70 U/L (18 @ 16:25)    RADIOLOGY & ADDITIONAL TESTS:  < from: Xray Chest 1 View- PORTABLE-Urgent (18 @ 23:39) >    EXAM:  XR CHEST PORTABLE URGENT 1V                            PROCEDURE DATE:  2018        INTERPRETATION:  Portable chest radiograph       CLINICAL INFORMATION: Line placement. GI bleeding.    TECHNIQUE:  Single portable frontal AP view ofthe chest was obtained.    FINDINGS: The examination of 10/17/2018 is available for review.    The lungs are free of infiltrate.     No pleural abnormality is seen.      The heart and mediastinum appear unchanged. The tip of the right IJ line   is projecting over the superior vena cava    No destructive lesion is seen in the visualized skeleton.    IMPRESSION:   No infiltrate.      < end of copied text >    HEALTH ISSUES - PROBLEM Dx:  GIB (gastrointestinal bleeding): GIB (gastrointestinal bleeding)  Lactic acid blood increased: Lactic acid blood increased  Prophylactic measure: Prophylactic measure  Pedal edema: Pedal edema  Neuropathy: Neuropathy  Hypercholesteremia: Hypercholesteremia  Hypertension: Hypertension  Fatty liver disease, nonalcoholic: Fatty liver disease, nonalcoholic  Hepatic encephalopathy: Hepatic encephalopathy  Diabetes: Diabetes  Near syncope: Near syncope  Vomiting: Vomiting    Consultant(s) Notes Reviewed:  [ x ] YES     Care Discussed with [X] Consultants  [  x] Patient  [  ] Family  [  ]   [  ] Social Service  [ x ] RN, [  ] Physical Therapy  DVT PPX: [  ] Lovenox, [  ] S C Heparin, [  ] Coumadin, [  ] Xarelto, [  ] Eliquis, [  ] Pradaxa, [  ] IV Heparin drip, [ x ] SCD [  ] Contraindication 2 to GI Bleed,[  ] Ambulation  Advanced directive: [x  ] None, [  ] DNR/DNI Patient is a 62y old  Male who presents with a chief complaint of vomiting and near syncope, upper GI bleeding (02 Dec 2018 13:00)      INTERVAL HPI:  Pt is a 63 y/o M with PMHx of Type 2 DM, HTN, HLD, obesity, nephrolithiasis (last stone 25 years ago), neuropathy, HE, NAFLD, Hyper ammonemia, recent Gastritis/ esophagitis on EGD & Gram variable teddy bacteremia in 10/2018  who presents with vomiting and  near syncope/falling out of a moving car. Pt [place] and [place]", his vision was cloudy, he pulled over the car and stopped and vomited. Vomitus was "creamy white." Pt denies seeing blood in vomitus. His dtr then took over driving and Pt again felt like he needed to vomit and told dtr to pull over. He thought the car had stopped and he opened the door and fell out of the moving car. He laid on the ground for ~ 15 minutes. Pt says he possibly lost consciousness. States when he was lying on the ground felt very cold. Pt has had "terrible heartburn" for 2 days and felt chills for 2 days. Pt denies abdominal pain, CP, palp, SOB, cough, myalgias, rash, HA. Pt admits to chills, vomiting (4 episodes), diarrhea (from lactulose), dizziness, pedal edema, tingling in hands (chronic, intermittent). Pt denies recent travel, sick contacts, different eating patterns.    Pt states that early this week pt saw her Endocrinologist & started back on Metformin 500 mg daily as per Dr's recommendation.  In ED Pt's vitals were: T(C): 36.3, HR: 91, BP: 85/59, RR: 15, SpO2: 98% on RA  Labs significant for lactate 5.6, , Hgb 10.4, total bili 3.7, alk phos 183, ammonia 72, Mg 1.4. UA - trace LE, mod blood, RBC 6-10, urine glucose 1,000.   CXR - negative chest  Abd Xray - There is mild content in the colon and no overt sense of bowel obstruction.  CT head - no intracranial hemorrhage or mass effect  CT cervical - Multilevel degenerative disc disease/cervical spondylosis, with posterior osteophyte disc complex C6-7 resulting in flattening of the cervical cord. If there is a clinical concern for spinal cord injury, recommend further evaluation with MRI if there are no clinical contraindications.  EKG - NSR, 93bpm    Pt given 2L NS bolus, Zofran pantoprazole, famotidine, Carafate , Pt seen by GI Dr Morales in ER & was admitted for further Eval.    18: Pt seen, examined, Had total 4 bloody BM so far , Lactate improved, pt had CT A/P with IV Contrast. Drop in H/H. plan for, start Protonix drip,  2 u pRBC 1 u FFP & 5 mg Vit K x 1 as d/w Dr Franks -GI, Elevated ammonia level. Coagulopathy, Improving Lactate. Ac Blood loss Anemia.    18: Pt seen, Examined, pt was transferred to ICU on 18 for symptomatic upper GI Bleed, S/P 7  units pRBC in Total , 3 FFP, 1 platelets transfusion given, Pt had emergent EGD done that found Ac Bleeding Duodenal ulcer found. S/P Epi given. Homeostasis obtained, Pt seen By Sx Dr Hernandez. H/H Stable,  today with MISA, Coagulopathy , Leukocytosis & High Lactate level.  H/H Low stable with Ac Blood  Loss Anemia. Hematology Dr Rios called, as pt is followed by Dr Rios- H/O as out pt.    12/3/18: Patient seen and examined this morning at bedside. Patient denies any acute complaints this morning. Hgb drop from 8.2 --> 7.7. Leukocytosis present at 15.08. Lactate level drop from 3.2 --> 2.5    19: Patient seen and examined this morning. Patient was sitting out of bed in chair, awake, alert, and oriented x3. Hb dropped to 6.6.  Patient states he is feeling better since yesterday. Patient had EGD done yesterday that showed large bleeding Duodenal ulcer s/p clips x2, epi injection, and cautery. Patient was given 3 units of packed red blood cells yesterday. Hbg increased from 7.3 --> 7.8. WBC count drop from 12.53 --> 9.47. Patient tolerating ice chips and clear liquid diet. Patient denies fever, chills, headache, weakness chest pain, palpitations, sob, N/V, diarrhea, constipation, blood in stool, urinary symptoms. Patient admits to feeling fatigued. Has not had a BM today.     18: patient seen, examined at bedside, patient lying comfortably in bed. Hgb at 7.8. WBC 9.35, patient tolerating clear liquid diet. Patient denies fevers, chills, CP, palpitations, N/V/D. As per nurse, BM are still dark. Patient is asymptomatic at this time. On Regular diet.     OVERNIGHT EVENTS: NONE    Home Medications:  Bacid (LAC) oral tablet: 1 tab(s) orally 2 times a day for  2weeks (2018 19:41)  HumaLOG 100 units/mL subcutaneous solution: 20 unit(s) subcutaneous 2 times a day(breakfast and lunch) (2018 19:41)  HumaLOG 100 units/mL subcutaneous solution: 30 unit(s) subcutaneous once a day(@dinner) (2018 19:41)  irbesartan 300 mg oral tablet: 1 tab(s) orally once a day (2018 19:41)  lactulose 10 g/15 mL oral syrup: 15 milliliter(s) orally 3 times a day (2018 19:41)  Lasix 20 mg oral tablet: 1 tab(s) orally once a day (2018 19:41)  pantoprazole 40 mg oral delayed release tablet: 1 tab(s) orally once a day (2018 19:41)  phytonadione 100 mcg oral tablet: 1 tab(s) orally once a day (2018 19:41)  potassium chloride 10 mEq oral tablet, extended release: 1 tab(s) orally once a day (2018 19:41)  pravastatin 40 mg oral tablet: 1 tab(s) orally once a day (2018 19:41)  Vitamin D3 2000 intl units oral tablet: 1 tab(s) orally once a day (2018 19:41)      MEDICATIONS  (STANDING):  dextrose 5%. 1000 milliLiter(s) (50 mL/Hr) IV Continuous <Continuous>  dextrose 50% Injectable 12.5 Gram(s) IV Push once  dextrose 50% Injectable 25 Gram(s) IV Push once  dextrose 50% Injectable 25 Gram(s) IV Push once  gabapentin 200 milliGRAM(s) Oral four times a day  insulin glargine Injectable (LANTUS) 22 Unit(s) SubCutaneous at bedtime  insulin lispro (HumaLOG) corrective regimen sliding scale   SubCutaneous every 6 hours  pantoprazole Infusion 8 mG/Hr (10 mL/Hr) IV Continuous <Continuous>  rifaximin 550 milliGRAM(s) Oral two times a day  sucralfate 1 Gram(s) Oral every 6 hours  ursodiol Capsule 300 milliGRAM(s) Oral three times a day    MEDICATIONS  (PRN):  dextrose 40% Gel 15 Gram(s) Oral once PRN Blood Glucose LESS THAN 70 milliGRAM(s)/deciliter  glucagon  Injectable 1 milliGRAM(s) IntraMuscular once PRN Glucose LESS THAN 70 milligrams/deciliter      Allergies    codeine (Anaphylaxis)    Intolerances    REVIEW OF SYSTEMS: i feel tired, Hungry  CONSTITUTIONAL: No fever, No chills No myalgia, No Body ache, No Weakness. Admits to fatigue.  EYES: No eye pain,  No visual disturbances, No discharge, NO Redness  ENMT:  No ear pain, No nose bleed, No vertigo; No sinus or throat pain, No Congestion  NECK: No pain, No stiffness  RESPIRATORY: No cough, wheezing, No hemoptysis, No shortness of breath  CARDIOVASCULAR: No chest pain, palpitations  GASTROINTESTINAL: No abdominal or epigastric pain. No nausea, No vomiting; No diarrhea or constipation. [ x ] BM  GENITOURINARY: No dysuria, No frequency, No urgency, No hematuria, or incontinence  NEUROLOGICAL: No headaches, No dizziness, No numbness, No tingling, No tremors, No weakness  EXT: No Swelling, No Pain, No Edema  SKIN:  [ x ] No itching, burning, new rashes, or lesions   MUSCULOSKELETAL: No joint pain. No muscle pain, No back pain, No extremity pain  PSYCHIATRIC: No depression, anxiety, mood swings or difficulty sleeping at night  PAIN SCALE: [x  ] None  [  ] Other-  ROS Unable to obtain due to - [  ] Dementia  [  ] Lethargy  [  ] Sedated [  ] non verbal  REST OF REVIEW Of SYSTEM - [ x ] Normal     Vital Signs Last 24 Hrs  T(C): 37.3 (05 Dec 2018 08:37), Max: 37.3 (05 Dec 2018 08:37)  T(F): 99.1 (05 Dec 2018 08:37), Max: 99.1 (05 Dec 2018 08:37)  HR: 93 (05 Dec 2018 11:00) (93 - 104)  BP: 116/56 (05 Dec 2018 11:00) (102/51 - 138/63)  BP(mean): 80 (05 Dec 2018 11:00) (73 - 91)  RR: 14 (05 Dec 2018 11:00) (12 - 31)  SpO2: 97% (05 Dec 2018 11:00) (92% - 97%)     @ :  -   @ 07:00  --------------------------------------------------------  IN: 1914 mL / OUT: 1030 mL / NET: 884 mL     @ 07:01  -   @ 13:15  --------------------------------------------------------  IN: 50 mL / OUT: 220 mL / NET: -170 mL    PHYSICAL EXAM: Rt IJ TLC , Beasley cath  GENERAL:  [x  ] NAD , [ x ] well appearing, [  ] Agitated, [  ] Drowsy,  [  ] Lethargy, [  ] confused   HEAD:  [ x ] Normal, [  ] Other  EYES:  [ x ] EOMI, [x  ] PERRLA, [ x ] conjunctiva and sclera clear normal, [  ] Other,  [x  ] Pallor,[  ] Discharge  ENMT:  [x  ] Normal, [ x ] Moist mucous membranes, [ x ] Good dentition, [ x ] No Thrush  NECK:  [x  ] Supple, [ x ] No JVD, [x  ] Normal thyroid, [  ] Lymphadenopathy [  ] Other  CHEST/LUNG:  [x  ] Clear to auscultation bilaterally, [ x ] Breath Sounds equal B/L,  [x  ] No rales, [x  ] No rhonchi  [ x ]  No wheezing  HEART:  [x  ] irregular rate and regular rhythm, [ x ] tachycardia, [  ] Bradycardia,  [  ] irregular  [ x] No murmurs, No rubs, No gallops, [  ] PPM in place (Mfr:  )  ABDOMEN:  [x  ] Soft, [ x ] Nontender, [x  ] Nondistended, [x  ] No mass, [x  ] Bowel sounds present, [ x ] obese  NERVOUS SYSTEM:  [x  ] Alert & Oriented X3, [ x ] Nonfocal  [  ] Confusion  [  ] Encephalopathic [  ] Sedated [  ] Unable to assess, [  ] Other-  EXTREMITIES: [ x ] 2+ Peripheral Pulses, No clubbing, No cyanosis,  [  ] edema B/L lower EXT. [  ] PVD stasis skin changes B/L Lower EXT  LYMPH: No lymphadenopathy noted  SKIN:  [ x ] No new rashes or lesions, [  ] Pressure Ulcers, [  ] ecchymosis, [  ] Skin Tears, [  ] Other    DIET: clear liquid diet     LABS:                        7.8    9.35  )-----------( 106      ( 05 Dec 2018 05:59 )             22.5     12-    140  |  104  |  29<H>  ----------------------------<  65<L>  3.4<L>   |  29  |  0.98    Ca    7.4<L>      05 Dec 2018 05:59  Phos  2.1     12-  Mg     1.9     12-    TPro  4.1<L>  /  Alb  1.8<L>  /  TBili  2.9<H>  /  DBili  x   /  AST  36  /  ALT  19  /  AlkPhos  72  12-05    LIVER FUNCTIONS - ( 05 Dec 2018 05:59 )  Alb: 1.8 g/dL / Pro: 4.1 g/dL / ALK PHOS: 72 U/L / ALT: 19 U/L / AST: 36 U/L / GGT: x           PT/INR - ( 04 Dec 2018 06:12 )   PT: 18.6 sec;   INR: 1.61 ratio         PTT - ( 04 Dec 2018 06:12 )  PTT:27.7 sec    Lactate, Blood (12.03.18 @ 06:24)    Lactate, Blood: 2.5 mmol/L      Lactate, Blood (12.02.18 @ 12:52)    Lactate, Blood: 4.2 mmol/L    Hemoglobin A1C, Whole Blood (12..18 @ 10:32)  Hemoglobin A1C, Whole Blood: 8.3: Method: Immunoassay    Urinalysis Basic - ( 2018 18:30 )    Color: Yellow / Appearance: Slightly Turbid / S.015 / pH: x  Gluc: x / Ketone: Trace  / Bili: Small / Urobili: Negative   Blood: x / Protein: 25 mg/dL / Nitrite: Negative   Leuk Esterase: Trace / RBC: 6-10 /HPF / WBC 3-5   Sq Epi: x / Non Sq Epi: Occasional / Bacteria: Occasional    Culture Results:   No growth to date. ( @ 20:55)  Culture Results:   No growth to date. ( @ 20:55)  Culture Results:   <10,000 CFU/ml Normal Urogenital ninfa present ( @ 20:19)    culture blood  -- .Blood Blood  @ 20:55    culture urine  --   @ 20:55  culture blood  -- .Urine Clean Catch (Midstream)  @ 20:19    culture urine  --   @ 20:19    CARDIAC MARKERS ( 02 Dec 2018 08:40 )  x     / x     / 504 U/L / x     / x      CARDIAC MARKERS ( 2018 23:51 )  .019 ng/mL / x     / 301 U/L / x     / 2.8 ng/mL  CARDIAC MARKERS ( 2018 16:25 )  .019 ng/mL / x     / x     / x     / x        Culture - Blood (collected 2018 20:55)  Source: .Blood Blood  Preliminary Report (01 Dec 2018 21:01):    No growth to date.    Culture - Blood (collected 2018 20:55)  Source: .Blood Blood  Preliminary Report (01 Dec 2018 21:01):    No growth to date.    Culture - Urine (collected 2018 20:19)  Source: .Urine Clean Catch (Midstream)  Final Report (01 Dec 2018 15:16):    <10,000 CFU/ml Normal Urogenital ninfa present    Lipase, Serum: 70 U/L (18 @ 16:25)    RADIOLOGY & ADDITIONAL TESTS:  < from: Xray Chest 1 View- PORTABLE-Urgent (18 @ 23:39) >    EXAM:  XR CHEST PORTABLE URGENT 1V                            PROCEDURE DATE:  2018        INTERPRETATION:  Portable chest radiograph       CLINICAL INFORMATION: Line placement. GI bleeding.    TECHNIQUE:  Single portable frontal AP view ofthe chest was obtained.    FINDINGS: The examination of 10/17/2018 is available for review.    The lungs are free of infiltrate.     No pleural abnormality is seen.      The heart and mediastinum appear unchanged. The tip of the right IJ line   is projecting over the superior vena cava    No destructive lesion is seen in the visualized skeleton.    IMPRESSION:   No infiltrate.      < end of copied text >    HEALTH ISSUES - PROBLEM Dx:  GIB (gastrointestinal bleeding): GIB (gastrointestinal bleeding)  Lactic acid blood increased: Lactic acid blood increased  Prophylactic measure: Prophylactic measure  Pedal edema: Pedal edema  Neuropathy: Neuropathy  Hypercholesteremia: Hypercholesteremia  Hypertension: Hypertension  Fatty liver disease, nonalcoholic: Fatty liver disease, nonalcoholic  Hepatic encephalopathy: Hepatic encephalopathy  Diabetes: Diabetes  Near syncope: Near syncope  Vomiting: Vomiting    Consultant(s) Notes Reviewed:  [ x ] YES     Care Discussed with [X] Consultants  [  x] Patient  [  ] Family  [  ]   [  ] Social Service  [ x ] RN, [  ] Physical Therapy  DVT PPX: [  ] Lovenox, [  ] S C Heparin, [  ] Coumadin, [  ] Xarelto, [  ] Eliquis, [  ] Pradaxa, [  ] IV Heparin drip, [ x ] SCD [  ] Contraindication 2 to GI Bleed,[  ] Ambulation  Advanced directive: [x  ] None, [  ] DNR/DNI Patient is a 62y old  Male who presents with a chief complaint of vomiting and near syncope, upper GI bleeding (02 Dec 2018 13:00)      INTERVAL HPI:  Pt is a 63 y/o M with PMHx of Type 2 DM, HTN, HLD, obesity, nephrolithiasis (last stone 25 years ago), neuropathy, HE, NAFLD, Hyper ammonemia, recent Gastritis/ esophagitis on EGD & Gram variable teddy bacteremia in 10/2018  who presents with vomiting and  near syncope/falling out of a moving car. Pt states that [place] and felt "off", his vision was cloudy, he pulled over the car and stopped and vomited. Vomitus was "creamy white." Pt denies seeing blood in vomitus. His dtr then took over driving and Pt again felt like he needed to vomit and told dtr to pull over. He thought the car had stopped and he opened the door and fell out of the moving car. He laid on the ground for ~ 15 minutes. Pt says he possibly lost consciousness. States when he was lying on the ground felt very cold. Pt has had "terrible heartburn" for 2 days and felt chills for 2 days. Pt denies abdominal pain, CP, palp, SOB, cough, myalgias, rash, HA. Pt admits to chills, vomiting (4 episodes), diarrhea (from lactulose), dizziness, pedal edema, tingling in hands (chronic, intermittent). Pt denies recent travel, sick contacts, different eating patterns.    Pt states that early this week pt saw her Endocrinologist & started back on Metformin 500 mg daily as per Dr's recommendation.  In ED Pt's vitals were: T(C): 36.3, HR: 91, BP: 85/59, RR: 15, SpO2: 98% on RA  Labs significant for lactate 5.6, , Hgb 10.4, total bili 3.7, alk phos 183, ammonia 72, Mg 1.4. UA - trace LE, mod blood, RBC 6-10, urine glucose 1,000.   CXR - negative chest  Abd Xray - There is mild content in the colon and no overt sense of bowel obstruction.  CT head - no intracranial hemorrhage or mass effect  CT cervical - Multilevel degenerative disc disease/cervical spondylosis, with posterior osteophyte disc complex C6-7 resulting in flattening of the cervical cord. If there is a clinical concern for spinal cord injury, recommend further evaluation with MRI if there are no clinical contraindications.  EKG - NSR, 93bpm    Pt given 2L NS bolus, Zofran pantoprazole, famotidine, Carafate , Pt seen by GI Dr Morales in ER & was admitted for further Eval.    18: Pt seen, examined, Had total 4 bloody BM so far , Lactate improved, pt had CT A/P with IV Contrast. Drop in H/H. plan for, start Protonix drip,  2 u pRBC 1 u FFP & 5 mg Vit K x 1 as d/w Dr Franks -GI, Elevated ammonia level. Coagulopathy, Improving Lactate. Ac Blood loss Anemia.    18: Pt seen, Examined, pt was transferred to ICU on 18 for symptomatic upper GI Bleed, S/P 7  units pRBC in Total , 3 FFP, 1 platelets transfusion given, Pt had emergent EGD done that found Ac Bleeding Duodenal ulcer found. S/P Epi given. Homeostasis obtained, Pt seen By Sx Dr Hernandez. H/H Stable,  today with MISA, Coagulopathy , Leukocytosis & High Lactate level.  H/H Low stable with Ac Blood  Loss Anemia. Hematology Dr Rios called, as pt is followed by Dr Rios- H/O as out pt.    12/3/18: Patient seen and examined this morning at bedside. Patient denies any acute complaints this morning. Hgb drop from 8.2 --> 7.7. Leukocytosis present at 15.08. Lactate level drop from 3.2 --> 2.5    19: Patient seen and examined this morning. Patient was sitting out of bed in chair, awake, alert, and oriented x3. Hb dropped to 6.6.  Patient states he is feeling better since yesterday. Patient had EGD done yesterday that showed large bleeding Duodenal ulcer s/p clips x2, epi injection, and cautery. Patient was given 3 units of packed red blood cells yesterday. Hbg increased from 7.3 --> 7.8. WBC count drop from 12.53 --> 9.47. Patient tolerating ice chips and clear liquid diet. Patient denies fever, chills, headache, weakness chest pain, palpitations, sob, N/V, diarrhea, constipation, blood in stool, urinary symptoms. Patient admits to feeling fatigued. Has not had a BM today.     18: patient seen, examined at bedside, patient lying comfortably in bed. Hgb at 7.8. WBC 9.35, patient tolerating clear liquid diet. Patient denies fevers, chills, CP, palpitations, N/V/D. As per nurse, BM are still dark. Patient is asymptomatic at this time. On Regular diet.     OVERNIGHT EVENTS: NONE    Home Medications:  Bacid (LAC) oral tablet: 1 tab(s) orally 2 times a day for  2weeks (2018 19:41)  HumaLOG 100 units/mL subcutaneous solution: 20 unit(s) subcutaneous 2 times a day(breakfast and lunch) (2018 19:41)  HumaLOG 100 units/mL subcutaneous solution: 30 unit(s) subcutaneous once a day(@dinner) (2018 19:41)  irbesartan 300 mg oral tablet: 1 tab(s) orally once a day (2018 19:41)  lactulose 10 g/15 mL oral syrup: 15 milliliter(s) orally 3 times a day (2018 19:41)  Lasix 20 mg oral tablet: 1 tab(s) orally once a day (2018 19:41)  pantoprazole 40 mg oral delayed release tablet: 1 tab(s) orally once a day (2018 19:41)  phytonadione 100 mcg oral tablet: 1 tab(s) orally once a day (2018 19:41)  potassium chloride 10 mEq oral tablet, extended release: 1 tab(s) orally once a day (2018 19:41)  pravastatin 40 mg oral tablet: 1 tab(s) orally once a day (2018 19:41)  Vitamin D3 2000 intl units oral tablet: 1 tab(s) orally once a day (2018 19:41)      MEDICATIONS  (STANDING):  dextrose 5%. 1000 milliLiter(s) (50 mL/Hr) IV Continuous <Continuous>  dextrose 50% Injectable 12.5 Gram(s) IV Push once  dextrose 50% Injectable 25 Gram(s) IV Push once  dextrose 50% Injectable 25 Gram(s) IV Push once  gabapentin 200 milliGRAM(s) Oral four times a day  insulin glargine Injectable (LANTUS) 50 Unit(s) SubCutaneous at bedtime  insulin lispro (HumaLOG) corrective regimen sliding scale   SubCutaneous Before meals and at bedtime  lactulose Syrup 15 Gram(s) Oral two times a day  rifaximin 550 milliGRAM(s) Oral two times a day  sucralfate 1 Gram(s) Oral every 6 hours  ursodiol Capsule 300 milliGRAM(s) Oral three times a day    MEDICATIONS  (PRN):  dextrose 40% Gel 15 Gram(s) Oral once PRN Blood Glucose LESS THAN 70 milliGRAM(s)/deciliter  glucagon  Injectable 1 milliGRAM(s) IntraMuscular once PRN Glucose LESS THAN 70 milligrams/deciliter      Allergies    codeine (Anaphylaxis)    Intolerances    REVIEW OF SYSTEMS: i feel tired, Hungry  CONSTITUTIONAL: No fever, No chills No myalgia, No Body ache, No Weakness. Admits to fatigue.  EYES: No eye pain,  No visual disturbances, No discharge, NO Redness  ENMT:  No ear pain, No nose bleed, No vertigo; No sinus or throat pain, No Congestion  NECK: No pain, No stiffness  RESPIRATORY: No cough, wheezing, No hemoptysis, No shortness of breath  CARDIOVASCULAR: No chest pain, palpitations  GASTROINTESTINAL: No abdominal or epigastric pain. No nausea, No vomiting; No diarrhea or constipation. [ x ] BM  GENITOURINARY: No dysuria, No frequency, No urgency, No hematuria, or incontinence  NEUROLOGICAL: No headaches, No dizziness, No numbness, No tingling, No tremors, No weakness  EXT: No Swelling, No Pain, No Edema  SKIN:  [ x ] No itching, burning, new rashes, or lesions   MUSCULOSKELETAL: No joint pain. No muscle pain, No back pain, No extremity pain  PSYCHIATRIC: No depression, anxiety, mood swings or difficulty sleeping at night  PAIN SCALE: [x  ] None  [  ] Other-  ROS Unable to obtain due to - [  ] Dementia  [  ] Lethargy  [  ] Sedated [  ] non verbal  REST OF REVIEW Of SYSTEM - [ x ] Normal     Vital Signs Last 24 Hrs  T(C): 37.3 (05 Dec 2018 08:37), Max: 37.3 (05 Dec 2018 08:37)  T(F): 99.1 (05 Dec 2018 08:37), Max: 99.1 (05 Dec 2018 08:37)  HR: 93 (05 Dec 2018 11:00) (93 - 104)  BP: 116/56 (05 Dec 2018 11:00) (102/51 - 138/63)  BP(mean): 80 (05 Dec 2018 11:00) (73 - 91)  RR: 14 (05 Dec 2018 11:00) (12 - 31)  SpO2: 97% (05 Dec 2018 11:00) (92% - 97%)     @ 07:  -   @ 07:00  --------------------------------------------------------  IN: 1914 mL / OUT: 1030 mL / NET: 884 mL     @ 07:01  -   @ 13:15  --------------------------------------------------------  IN: 50 mL / OUT: 220 mL / NET: -170 mL    PHYSICAL EXAM: Rt IJ TLC , Beasley cath  GENERAL:  [x  ] NAD , [ x ] well appearing, [  ] Agitated, [  ] Drowsy,  [  ] Lethargy, [  ] confused   HEAD:  [ x ] Normal, [  ] Other  EYES:  [ x ] EOMI, [x  ] PERRLA, [ x ] conjunctiva and sclera clear normal, [  ] Other,  [x  ] Pallor,[  ] Discharge  ENMT:  [x  ] Normal, [ x ] Moist mucous membranes, [ x ] Good dentition, [ x ] No Thrush  NECK:  [x  ] Supple, [ x ] No JVD, [x  ] Normal thyroid, [  ] Lymphadenopathy [  ] Other  CHEST/LUNG:  [x  ] Clear to auscultation bilaterally, [ x ] Breath Sounds equal B/L,  [x  ] No rales, [x  ] No rhonchi  [ x ]  No wheezing  HEART:  [x  ] irregular rate and regular rhythm, [ x ] tachycardia, [  ] Bradycardia,  [  ] irregular  [ x] No murmurs, No rubs, No gallops, [  ] PPM in place (Mfr:  )  ABDOMEN:  [x  ] Soft, [ x ] Nontender, [x  ] Nondistended, [x  ] No mass, [x  ] Bowel sounds present, [ x ] obese  NERVOUS SYSTEM:  [x  ] Alert & Oriented X3, [ x ] Nonfocal  [  ] Confusion  [  ] Encephalopathic [  ] Sedated [  ] Unable to assess, [  ] Other-  EXTREMITIES: [ x ] 2+ Peripheral Pulses, No clubbing, No cyanosis,  [  ] edema B/L lower EXT. [  ] PVD stasis skin changes B/L Lower EXT  LYMPH: No lymphadenopathy noted  SKIN:  [ x ] No new rashes or lesions, [  ] Pressure Ulcers, [  ] ecchymosis, [  ] Skin Tears, [  ] Other    DIET: clear liquid diet     LABS:                        7.8    9.35  )-----------( 106      ( 05 Dec 2018 05:59 )             22.5     12-05    140  |  104  |  29<H>  ----------------------------<  65<L>  3.4<L>   |  29  |  0.98    Ca    7.4<L>      05 Dec 2018 05:59  Phos  2.1     12-  Mg     1.9     12-    TPro  4.1<L>  /  Alb  1.8<L>  /  TBili  2.9<H>  /  DBili  x   /  AST  36  /  ALT  19  /  AlkPhos  72  12-05    LIVER FUNCTIONS - ( 05 Dec 2018 05:59 )  Alb: 1.8 g/dL / Pro: 4.1 g/dL / ALK PHOS: 72 U/L / ALT: 19 U/L / AST: 36 U/L / GGT: x           PT/INR - ( 04 Dec 2018 06:12 )   PT: 18.6 sec;   INR: 1.61 ratio         PTT - ( 04 Dec 2018 06:12 )  PTT:27.7 sec    Lactate, Blood (12.03.18 @ 06:24)    Lactate, Blood: 2.5 mmol/L      Lactate, Blood (12.02.18 @ 12:52)    Lactate, Blood: 4.2 mmol/L    Hemoglobin A1C, Whole Blood (12..18 @ 10:32)  Hemoglobin A1C, Whole Blood: 8.3: Method: Immunoassay    Urinalysis Basic - ( 2018 18:30 )    Color: Yellow / Appearance: Slightly Turbid / S.015 / pH: x  Gluc: x / Ketone: Trace  / Bili: Small / Urobili: Negative   Blood: x / Protein: 25 mg/dL / Nitrite: Negative   Leuk Esterase: Trace / RBC: 6-10 /HPF / WBC 3-5   Sq Epi: x / Non Sq Epi: Occasional / Bacteria: Occasional    Culture Results:   No growth to date. ( @ 20:55)  Culture Results:   No growth to date. ( @ 20:55)  Culture Results:   <10,000 CFU/ml Normal Urogenital ninfa present ( @ 20:19)    culture blood  -- .Blood Blood  @ 20:55    culture urine  --   @ 20:55  culture blood  -- .Urine Clean Catch (Midstream)  @ 20:19    culture urine  --   @ 20:19    CARDIAC MARKERS ( 02 Dec 2018 08:40 )  x     / x     / 504 U/L / x     / x      CARDIAC MARKERS ( 2018 23:51 )  .019 ng/mL / x     / 301 U/L / x     / 2.8 ng/mL  CARDIAC MARKERS ( 2018 16:25 )  .019 ng/mL / x     / x     / x     / x        Culture - Blood (collected 2018 20:55)  Source: .Blood Blood  Preliminary Report (01 Dec 2018 21:01):    No growth to date.    Culture - Blood (collected 2018 20:55)  Source: .Blood Blood  Preliminary Report (01 Dec 2018 21:01):    No growth to date.    Culture - Urine (collected 2018 20:19)  Source: .Urine Clean Catch (Midstream)  Final Report (01 Dec 2018 15:16):    <10,000 CFU/ml Normal Urogenital ninfa present    Lipase, Serum: 70 U/L (18 @ 16:25)    RADIOLOGY & ADDITIONAL TESTS:  < from: Xray Chest 1 View- PORTABLE-Urgent (18 @ 23:39) >    EXAM:  XR CHEST PORTABLE URGENT 1V                            PROCEDURE DATE:  2018        INTERPRETATION:  Portable chest radiograph       CLINICAL INFORMATION: Line placement. GI bleeding.    TECHNIQUE:  Single portable frontal AP view ofthe chest was obtained.    FINDINGS: The examination of 10/17/2018 is available for review.    The lungs are free of infiltrate.     No pleural abnormality is seen.      The heart and mediastinum appear unchanged. The tip of the right IJ line   is projecting over the superior vena cava    No destructive lesion is seen in the visualized skeleton.    IMPRESSION:   No infiltrate.      < end of copied text >    HEALTH ISSUES - PROBLEM Dx:  GIB (gastrointestinal bleeding): GIB (gastrointestinal bleeding)  Lactic acid blood increased: Lactic acid blood increased  Prophylactic measure: Prophylactic measure  Pedal edema: Pedal edema  Neuropathy: Neuropathy  Hypercholesteremia: Hypercholesteremia  Hypertension: Hypertension  Fatty liver disease, nonalcoholic: Fatty liver disease, nonalcoholic  Hepatic encephalopathy: Hepatic encephalopathy  Diabetes: Diabetes  Near syncope: Near syncope  Vomiting: Vomiting    Consultant(s) Notes Reviewed:  [ x ] YES     Care Discussed with [X] Consultants  [  x] Patient  [  ] Family  [  ]   [  ] Social Service  [ x ] RN, [  ] Physical Therapy  DVT PPX: [  ] Lovenox, [  ] S C Heparin, [  ] Coumadin, [  ] Xarelto, [  ] Eliquis, [  ] Pradaxa, [  ] IV Heparin drip, [ x ] SCD [  ] Contraindication 2 to GI Bleed,[  ] Ambulation  Advanced directive: [x  ] None, [  ] DNR/DNI

## 2018-12-05 NOTE — DISCHARGE NOTE ADULT - PLAN OF CARE
improvement of symptoms you were taken care of in the ICU. You were treated for a GI bleed. During your hospital stay, you had multiple transfusions to maintain your Hgb. You had 2 endoscopy's to find a source of bleed. You were found to have a bleeding duodenal ulcer. This was stabilized. Please be sure to take Carafate and pantoprazole as prescribed. Please follow up with your GI doctor, Dr. Morales, within 1 week of hospital discharge. please continue using your Trojeo as prescribed. Please follow up with Dr. Craig Perlman, the endocrinologist that took care of you in the hospital, within 1 week of hospital discharge. A PICC line was placed in your right arm for IV antibiotics to be given at home for a total of 10 days (stop date 12/18/19). Please follow up with your doctor within 1 week of hospital discharge. please stop taking irbesartan as your BP has been running low. Please follow up with your doctor within 1 week of hospital discharge to see when it is safe to re-start the medication. please continue pravastatin as prescribed. Please follow up with your doctor within 1 week of hospital discharge. please make sure to take lactulose and Xifaxan as prescribed. Please also make sure to eat a diet low in protein. Please follow up with your doctor within 1 week of hospital discharge. you were taken care of in the ICU. You were treated for a GI bleed. During your hospital stay, you had multiple transfusions to maintain your Hgb. You had 2 endoscopy' s to find a source of bleed. You were found to have a bleeding duodenal ulcer. This was stabilized. Please be sure to take Carafate and pantoprazole as prescribed. Please follow up with your GI doctor, Dr. Morales, within 1 week of hospital discharge. please continue using your Trojeo as prescribed. Please do not take metformin at this time. Please follow up with Dr. Craig Perlman, the endocrinologist that took care of you in the hospital, within 1 week of hospital discharge. you were taken care of in the ICU. You were treated for a GI bleed. During your hospital stay, you had multiple transfusions to maintain your Hgb. You had 2 endoscopy' s to find a source of bleed. You were found to have a bleeding duodenal ulcer. This was stabilized.   -Please be sure to take Carafate 4x day and pantoprazole 2x day for 30 days as prescribed, followed by daily Protonix & Carafate as per Dr Morales.   - Please follow up with your GI doctor, Dr. Morales, within 1 week of hospital discharge.  -Repeat EGD-Endoscopy in 4-6 weeks as out pt with GI  -Repeat CBC in 1week with PMD DR Coto please continue using your Lantus 50 Units daily at bed time  as prescribed.  - Please DO NOT  take metformin at home. FSBS Before each meals to adjust Pre meals Humalog Insulin  Please d/w your Endocrinologist to adjust your Lantus & Pre meals Humalog Insulin doses.  -Please follow up with Dr. Craig Perlman, the endocrinologist that took care of you in the hospital, within 1 week of hospital discharge.  Neuropathy- Gabapentin 200 mg 4x day, follow with Neurology DR Montana in 2 weeks A PICC line was placed in your right arm for IV antibiotics-INVANZ  to be given at home for a total of 10 days (stop date 12/18/19).   - Do NOT wet your MID LINE   -Please follow up with your doctor within 1 week of hospital discharge. - Take Lasix 20 mg 1 tab daily with your Potassium 1 tab daily  -please STOP taking irbesartan as your BP has been running low.  - Please follow up with your doctor within 1 week of hospital discharge to see when it is safe to re-start the medication. please continue pravastatin as prescribed. Please follow up with your doctor within 1 week of hospital discharge. Follow CMP in 1 week with PMD please make sure to take lactulose and Xifaxan as prescribed.  - Please also make sure to eat a diet low in protein. Please follow up with your doctor within 1 week of hospital discharge. Anemia -2/2 Bleeding Duodenal Ulcer  you got 11 u pRBC in Total transfusions,   -CBC in 1 week with  DR Rios PMD  Thrombocytopenia -chronic, stable 2/2 Liver disease

## 2018-12-05 NOTE — DISCHARGE NOTE ADULT - PROVIDER TOKENS
[id number]' TOKEN:'[id number]'

## 2018-12-05 NOTE — PROGRESS NOTE ADULT - ASSESSMENT
62M with PMHx as above, admitted with near syncope/fall, vomiting, ABLA due to large duodenal ulcer with clot s/p clip x4, epi and cautery. Pt transfused 10 units prbc, 2 plts, 4 ffps. Pt also with MISA, lactic acidosis, uncontrolled hyperglycemia, and coagulopathy due to underlying JEAN    -Continue rifaximine/lactulose for hepatic encephalopathy  -Monitor HD's. Tachycardia improved. Holding AC   -Good O2 saturation on room air. Continue to monitor  -ADAT. PPI gtt  -Continue carafate  -F/U GI  -Serial H/H. Transfuse prn to keep Hgb > 7  -MISA improving. Monitor UOP/Lytes  -Replete hypokalemia and hypomagnesemia  -Lantus increased. Monitor FS as per protocol  -SCD for DVT ppx  -Supportive care

## 2018-12-05 NOTE — PROGRESS NOTE ADULT - ATTENDING COMMENTS
62M PMH DM2, neuropathy, HTN, HLD, JEAN, hepatic encephalopathy, & PUD who presents with acute blood loss anemia due to upper GI bleed, s/p EGD x2, found to have large bleeding duodenal ulcer s/p clips x2, epi injection, and cautery. S/p 10 units prbc's, 2 plts, and 4 FFP. Also with MISA, lactic acidosis, uncontrolled hyperglycemia, and coagulopathy due to underlying JEAN.     - Hepatic encephalopathy, continue rifaximin. Add lactulose  - HD stable, resolved sinus tachycardia. Hold antiplatelets and a/c  - Stable respiratory status at presents, on room air with SpO2>90%  - Continue PPI gtt today, change to PPI bid tomorrow. Continue sucralfate. Advance diet as tolerated. F/up GI. Hold a/c and antiplatelets. If continues to have bleeding, may need IR or surgical evaluation  - Stable H/H, continue to monitor for bleeding. SCD's for DVT ppx  - Resolved lactic acidosis  - MISA improving, strict I/O's, trend kidney function and lytes. D/c TERESO bob. Replete low mg and phos  - Observe off abx  - Increase lantus to 50 qhs, continue insulin coverage scale. When restarts meals, will restart insulin lispro. A1c 8.8  - Discussed with patient at bedside, full code 62M PMH DM2, neuropathy, HTN, HLD, JEAN, hepatic encephalopathy, & PUD who presents with acute blood loss anemia due to upper GI bleed, s/p EGD x2, found to have large bleeding duodenal ulcer s/p clips x2, epi injection, and cautery. S/p 10 units prbc's, 2 plts, and 4 FFP. Also with MISA, lactic acidosis, uncontrolled hyperglycemia, and coagulopathy due to underlying JEAN.     - Hepatic encephalopathy, continue rifaximin and lactulose, improved ammonia  - HD stable, resolved sinus tachycardia. Hold antiplatelets and a/c  - Stable respiratory status at presents, on room air with SpO2>90%  - Change PPI to bid. Continue sucralfate. Continue diabetic DASH diet. F/up GI. Hold a/c and antiplatelets. If continues to have bleeding, may need IR or surgical evaluation  - Stable H/H, continue to monitor for bleeding. SCD's for DVT ppx. Consider heparin sq tomorrow if H/H remains stable with no further bleeding  - Resolved lactic acidosis  - MISA improved, strict I/O's, trend kidney function and lytes. Replete low K, Mg, Phos  - Observe off abx  - Continue lantus 50 qhs, continue insulin coverage scale. Eventually will need to restart pre-meal lispro. A1c 8.8  - Discussed with patient at bedside, full code  - Stable for floors

## 2018-12-05 NOTE — PROGRESS NOTE ADULT - SUBJECTIVE AND OBJECTIVE BOX
Neurology follow up note    JESSICA [id number]      Interval History:    Patient feels ok no new complaints.    MEDICATIONS    atorvastatin 10 milliGRAM(s) Oral at bedtime  dextrose 40% Gel 15 Gram(s) Oral once PRN  dextrose 5%. 1000 milliLiter(s) IV Continuous <Continuous>  dextrose 50% Injectable 12.5 Gram(s) IV Push once  dextrose 50% Injectable 25 Gram(s) IV Push once  dextrose 50% Injectable 25 Gram(s) IV Push once  gabapentin 200 milliGRAM(s) Oral four times a day  glucagon  Injectable 1 milliGRAM(s) IntraMuscular once PRN  insulin glargine Injectable (LANTUS) 50 Unit(s) SubCutaneous at bedtime  insulin lispro (HumaLOG) corrective regimen sliding scale   SubCutaneous Before meals and at bedtime  lactulose Syrup 15 Gram(s) Oral two times a day  lidocaine 2% Gel 1 Application(s) Topical every 3 hours PRN  pantoprazole    Tablet 40 milliGRAM(s) Oral two times a day  rifaximin 550 milliGRAM(s) Oral two times a day  sucralfate 1 Gram(s) Oral every 6 hours  ursodiol Capsule 300 milliGRAM(s) Oral three times a day      Allergies    codeine (Anaphylaxis)    Intolerances            Vital Signs Last 24 Hrs  T(C): 37.4 (05 Dec 2018 12:08), Max: 37.4 (05 Dec 2018 12:08)  T(F): 99.3 (05 Dec 2018 12:08), Max: 99.3 (05 Dec 2018 12:08)  HR: 102 (05 Dec 2018 13:00) (93 - 102)  BP: 100/62 (05 Dec 2018 13:00) (100/62 - 138/63)  BP(mean): 71 (05 Dec 2018 13:00) (71 - 91)  RR: 26 (05 Dec 2018 13:00) (12 - 28)  SpO2: 94% (05 Dec 2018 13:00) (92% - 100%)      REVIEW OF SYSTEMS:  Constitutional: No fever, chills, fatigue, generalized weakness  Eyes: no eye pain, visual disturbances, or discharge  ENT:  No difficulty hearing, tinnitus, vertigo; No sinus or throat pain  Neck: No pain or stiffness  Respiratory: No cough, dyspnea, wheezing   Cardiovascular: No chest pain, palpitations,   Gastrointestinal: No abdominal or epigastric pain. No nausea, vomiting  No diarrhea or constipation.   Genitourinary: No dysuria, frequency, hematuria or incontinence  Neurological: No headaches, postive lightheadedness, no vertigo, numbness or tremors  Psychiatric: No depression, anxiety, mood swings or difficulty sleeping  Musculoskeletal: No joint pain or swelling; No muscle, back or extremity pain  Skin: No itching, burning, rashes or lesions   Lymph Nodes: No enlarged glands  Endocrine: No heat or cold intolerance; No hair loss   Allergy and Immunologic: No hives or eczema    On Neurological Examination:    The patient is awake, alert, oriented x3.    Extraocular movements were intact.    Pupils equal, round and reactive bilaterally, 3 mm to 2.    Speech was fluent.  Smile symmetric.    Motor:  Bilateral upper and lower 5/5.    Sensory:  Bilateral upper and lower intact to light touch.  No drift.  Finger-to-nose within normal limits.    Follow simple commands      GENERAL Exam: Nontoxic , No Acute Distress   	  HEENT:  normocephalic, atraumatic  		  LUNGS: Clear bilaterally    	  HEART: Normal S1S2   No murmur RRR        	  GI/ ABDOMEN:  Soft  Non tender    EXTREMITIES:   No Edema  No Clubbing  No Cyanosis     MUSCULOSKELETAL: decreased Range of Motion all 4 extremities   	   SKIN: Normal  No Ecchymosis                  LABS:  CBC Full  -  ( 05 Dec 2018 05:59 )  WBC Count : 9.35 K/uL  Hemoglobin : 7.8 g/dL  Hematocrit : 22.5 %  Platelet Count - Automated : 106 K/uL  Mean Cell Volume : 89.3 fl  Mean Cell Hemoglobin : 31.0 pg  Mean Cell Hemoglobin Concentration : 34.7 gm/dL  Auto Neutrophil # : 6.11 K/uL  Auto Lymphocyte # : 1.98 K/uL  Auto Monocyte # : 0.91 K/uL  Auto Eosinophil # : 0.19 K/uL  Auto Basophil # : 0.03 K/uL  Auto Neutrophil % : 65.4 %  Auto Lymphocyte % : 21.2 %  Auto Monocyte % : 9.7 %  Auto Eosinophil % : 2.0 %  Auto Basophil % : 0.3 %      12-05    140  |  104  |  29<H>  ----------------------------<  65<L>  3.4<L>   |  29  |  0.98    Ca    7.4<L>      05 Dec 2018 05:59  Phos  2.1     12-05  Mg     1.9     12-05    TPro  4.1<L>  /  Alb  1.8<L>  /  TBili  2.9<H>  /  DBili  x   /  AST  36  /  ALT  19  /  AlkPhos  72  12-05    Hemoglobin A1C:     LIVER FUNCTIONS - ( 05 Dec 2018 05:59 )  Alb: 1.8 g/dL / Pro: 4.1 g/dL / ALK PHOS: 72 U/L / ALT: 19 U/L / AST: 36 U/L / GGT: x           Vitamin B12   PT/INR - ( 04 Dec 2018 06:12 )   PT: 18.6 sec;   INR: 1.61 ratio         PTT - ( 04 Dec 2018 06:12 )  PTT:27.7 sec      RADIOLOGY      ANALYSIS AND PLAN:  A 62-year-old with episode of loss of consciousness.  1.	For episode of loss of consciousness, suspect most likely a syncopal event with a prodrome of feeling lightheaded, blurry vision, warm and sweaty, nausea and vomiting, as per spouse looked pale and becoming in the emergency room hypotensive points towards cerebral hypoperfusion.  There is no clear sign on examination to suggest a new cerebrovascular accident has ensued and there is no clear history to suggest underlying epilepsy.  2.	I would recommend telemetry evaluation, rule out underlying arrhythmia.  3.	Monitor systolic blood pressure.  4.	For history of diabetes, would recommend strict control of the patient's blood sugars.  5.	For history of neuropathy, continue the patient on his gabapentin.  6.	For episode of spasms, I would check calcium and phosphorus levels.  Questionable this could be secondary to slightly low magnesium.  7.	Spoke with spouse, Ada,  Her telephone number is [phone]  8.	monitor H/H   9.	GI work up as needed S/P GI bleed  10.	no new events   11.	monitor oral intake       Greater than 45 minutes spent in direct patient care reviewing  the notes, lab data/ imaging , discussion with multidisciplinary team.

## 2018-12-05 NOTE — PROGRESS NOTE ADULT - SUBJECTIVE AND OBJECTIVE BOX
62M with PMHx of DM, HTN, HLD, obesity, nephrolithiasis (last stone 25 years ago), neuropathy, HE, NAFLD, Hyper ammonemia, recent Gastritis/ esophagitis on EGD & Gram variable teddy bacteremia in 10/2018, initially admitted with near syncope and vomiting. Hospital course significant for ABLA due to UGIB. EGD showed large duodenal ulcer with clot s/p clip x4, epi and cautery. Pt transfused 10 units prbc, 2 plts, 4 ffps. Pt also with MISA, lactic acidosis, uncontrolled hyperglycemia, and coagulopathy due to underlying JEAN    24 hour events: Pt seen and examined at bedside. Chart/labs reviewed.     PAST MEDICAL & SURGICAL HISTORY:  GERD with esophagitis: Gastritis &amp; Non Bleeding Ulcers  Hepatic encephalopathy  Obesity  Fatty liver disease, nonalcoholic  Renal stones: 25 years ago  Hypertension  Neuropathy  Hypercholesteremia  Diabetes  S/P cholecystectomy      Review of Systems:  Constitutional: No fever, chills, fatigue  Neuro: No headache, numbness, weakness  Resp: No cough, wheezing, shortness of breath  CVS: No chest pain, palpitations, leg swelling  GI: No abdominal pain, nausea, vomiting, diarrhea   : No dysuria, frequency, incontinence  Skin: No itching, burning, rashes, or lesions   Msk: No joint pain or swelling  Psych: No depression, anxiety, mood swings    T(F): 100.5 (12-05-18 @ 20:40), Max: 100.5 (12-05-18 @ 20:40)  HR: 109 (12-05-18 @ 20:00) (93 - 109)  BP: 122/58 (12-05-18 @ 20:00) (100/62 - 133/63)  RR: 19 (12-05-18 @ 20:00) (12 - 32)  SpO2: 94% (12-05-18 @ 20:00) (91% - 100%)  Wt(kg): --        CAPILLARY BLOOD GLUCOSE      POCT Blood Glucose.: 191 mg/dL (05 Dec 2018 18:29)      I&O's Summary    04 Dec 2018 07:01  -  05 Dec 2018 07:00  --------------------------------------------------------  IN: 2075 mL / OUT: 3200 mL / NET: -1125 mL    05 Dec 2018 07:01  -  05 Dec 2018 21:34  --------------------------------------------------------  IN: 1210 mL / OUT: 500 mL / NET: 710 mL        Physical Exam:     Gen: obese male  Neuro: A&Ox3, non-focal  HEENT: NC/AT  Resp: CTA b/l  CVS: nl S1/S2, RRR  Abd: soft, nt, nd, +bs  Ext: no edema, +pulses  Skin: well perfused, warm      Meds:    rifaximin Oral  atorvastatin Oral  insulin glargine Injectable (LANTUS) SubCutaneous  insulin lispro (HumaLOG) corrective regimen sliding scale SubCutaneous  gabapentin Oral  lactulose Syrup Oral  pantoprazole    Tablet Oral  sucralfate Oral  ursodiol Capsule Oral  dextrose 5%. IV Continuous  lidocaine 2% Gel Topical PRN                              7.8    9.35  )-----------( 106      ( 05 Dec 2018 05:59 )             22.5       12-05    140  |  104  |  29<H>  ----------------------------<  65<L>  3.4<L>   |  29  |  0.98    Ca    7.4<L>      05 Dec 2018 05:59  Phos  2.1     12-05  Mg     1.9     12-05    TPro  4.1<L>  /  Alb  1.8<L>  /  TBili  2.9<H>  /  DBili  x   /  AST  36  /  ALT  19  /  AlkPhos  72  12-05          PT/INR - ( 04 Dec 2018 06:12 )   PT: 18.6 sec;   INR: 1.61 ratio         PTT - ( 04 Dec 2018 06:12 )  PTT:27.7 sec        CENTRAL LINE: Yes    HAYES: Yes    A-LINE: No    GLOBAL ISSUE/BEST PRACTICE:  Analgesia: no  Sedation: no  HOB elevation: yes  Stress ulcer prophylaxis: yes  VTE prophylaxis: scd  Glycemic control: yes  Nutrition: npo      CODE STATUS: Full  GOC discussion: Y

## 2018-12-05 NOTE — PROGRESS NOTE ADULT - SUBJECTIVE AND OBJECTIVE BOX
INTERVAL HPI/OVERNIGHT EVENTS:  pt seen and examined  denies n/v/abd pain, tolerating clears  per overnight rn w dark bm x1 overnight no brbpr no hematemesis  labs noted vs reviewed    MEDICATIONS  (STANDING):  atorvastatin 10 milliGRAM(s) Oral at bedtime  dextrose 5%. 1000 milliLiter(s) (50 mL/Hr) IV Continuous <Continuous>  dextrose 50% Injectable 12.5 Gram(s) IV Push once  dextrose 50% Injectable 25 Gram(s) IV Push once  dextrose 50% Injectable 25 Gram(s) IV Push once  gabapentin 200 milliGRAM(s) Oral four times a day  insulin glargine Injectable (LANTUS) 50 Unit(s) SubCutaneous at bedtime  insulin lispro (HumaLOG) corrective regimen sliding scale   SubCutaneous Before meals and at bedtime  lactulose Syrup 15 Gram(s) Oral two times a day  pantoprazole    Tablet 40 milliGRAM(s) Oral two times a day  rifaximin 550 milliGRAM(s) Oral two times a day  sucralfate 1 Gram(s) Oral every 6 hours  ursodiol Capsule 300 milliGRAM(s) Oral three times a day    MEDICATIONS  (PRN):  dextrose 40% Gel 15 Gram(s) Oral once PRN Blood Glucose LESS THAN 70 milliGRAM(s)/deciliter  glucagon  Injectable 1 milliGRAM(s) IntraMuscular once PRN Glucose LESS THAN 70 milligrams/deciliter  lidocaine 2% Gel 1 Application(s) Topical every 3 hours PRN pain      Allergies    codeine (Anaphylaxis)    Intolerances        Review of Systems:    General:  No wt loss, fevers, chills, night sweats, fatigue   Eyes:  Good vision, no reported pain  ENT:  No sore throat, pain, runny nose, dysphagia  CV:  No pain, palpitations, hypo/hypertension  Resp:  No dyspnea, cough, tachypnea, wheezing  GI:  No pain, No nausea, No vomiting, No diarrhea, No constipation, No weight loss, No fever, No pruritis, No rectal bleeding, ?melena, No dysphagia  :  No pain, bleeding, incontinence, nocturia  Muscle:  No pain, weakness  Neuro:  No weakness, tingling, memory problems  Psych:  No fatigue, insomnia, mood problems, depression  Endocrine:  No polyuria, polydypsia, cold/heat intolerance  Heme:  No petechiae, ecchymosis, easy bruisability  Skin:  No rash, tattoos, scars, edema      Vital Signs Last 24 Hrs  T(C): 37.3 (05 Dec 2018 08:37), Max: 37.3 (05 Dec 2018 08:37)  T(F): 99.1 (05 Dec 2018 08:37), Max: 99.1 (05 Dec 2018 08:37)  HR: 101 (05 Dec 2018 08:00) (94 - 104)  BP: 104/57 (05 Dec 2018 08:00) (102/51 - 138/63)  BP(mean): 77 (05 Dec 2018 08:00) (73 - 91)  RR: 25 (05 Dec 2018 08:00) (12 - 31)  SpO2: 94% (05 Dec 2018 08:00) (92% - 97%)    PHYSICAL EXAM:    Constitutional: NAD   HEENT: ncat  Neck: No LAD,  Respiratory: dec bs  Cardiovascular: S1 and S2, RRR  Gastrointestinal: obese abd soft nt mild dt  Extremities: No peripheral edema  Vascular: 2+ peripheral pulses  Neurological: awake alert responds appropriately but lethargic  Skin: jaundice      LABS:                        7.8    9.35  )-----------( 106      ( 05 Dec 2018 05:59 )             22.5     12-05    140  |  104  |  29<H>  ----------------------------<  65<L>  3.4<L>   |  29  |  0.98    Ca    7.4<L>      05 Dec 2018 05:59  Phos  2.1     12-05  Mg     1.9     12-05    TPro  4.1<L>  /  Alb  1.8<L>  /  TBili  2.9<H>  /  DBili  x   /  AST  36  /  ALT  19  /  AlkPhos  72  12-05    PT/INR - ( 04 Dec 2018 06:12 )   PT: 18.6 sec;   INR: 1.61 ratio         PTT - ( 04 Dec 2018 06:12 )  PTT:27.7 sec      RADIOLOGY & ADDITIONAL TESTS: INTERVAL HPI/OVERNIGHT EVENTS:  pt seen and examined  denies n/v/abd pain, tolerating clears  dark bm x1 overnight no brbpr no hematemesis      MEDICATIONS  (STANDING):  atorvastatin 10 milliGRAM(s) Oral at bedtime  dextrose 5%. 1000 milliLiter(s) (50 mL/Hr) IV Continuous <Continuous>  dextrose 50% Injectable 12.5 Gram(s) IV Push once  dextrose 50% Injectable 25 Gram(s) IV Push once  dextrose 50% Injectable 25 Gram(s) IV Push once  gabapentin 200 milliGRAM(s) Oral four times a day  insulin glargine Injectable (LANTUS) 50 Unit(s) SubCutaneous at bedtime  insulin lispro (HumaLOG) corrective regimen sliding scale   SubCutaneous Before meals and at bedtime  lactulose Syrup 15 Gram(s) Oral two times a day  pantoprazole    Tablet 40 milliGRAM(s) Oral two times a day  rifaximin 550 milliGRAM(s) Oral two times a day  sucralfate 1 Gram(s) Oral every 6 hours  ursodiol Capsule 300 milliGRAM(s) Oral three times a day    MEDICATIONS  (PRN):  dextrose 40% Gel 15 Gram(s) Oral once PRN Blood Glucose LESS THAN 70 milliGRAM(s)/deciliter  glucagon  Injectable 1 milliGRAM(s) IntraMuscular once PRN Glucose LESS THAN 70 milligrams/deciliter  lidocaine 2% Gel 1 Application(s) Topical every 3 hours PRN pain      Allergies    codeine (Anaphylaxis)    Intolerances              Vital Signs Last 24 Hrs  T(C): 37.3 (05 Dec 2018 08:37), Max: 37.3 (05 Dec 2018 08:37)  T(F): 99.1 (05 Dec 2018 08:37), Max: 99.1 (05 Dec 2018 08:37)  HR: 101 (05 Dec 2018 08:00) (94 - 104)  BP: 104/57 (05 Dec 2018 08:00) (102/51 - 138/63)  BP(mean): 77 (05 Dec 2018 08:00) (73 - 91)  RR: 25 (05 Dec 2018 08:00) (12 - 31)  SpO2: 94% (05 Dec 2018 08:00) (92% - 97%)    PHYSICAL EXAM:    Constitutional: NAD   HEENT: ncat  Neck: No LAD,  Respiratory: dec bs  Cardiovascular: S1 and S2, RRR  Gastrointestinal: obese abd soft nt mild dt  Extremities: No peripheral edema  Vascular: 2+ peripheral pulses  Neurological: awake alert responds appropriately but lethargic  Skin: residual jaundice      LABS:                        7.8    9.35  )-----------( 106      ( 05 Dec 2018 05:59 )             22.5     12-05    140  |  104  |  29<H>  ----------------------------<  65<L>  3.4<L>   |  29  |  0.98    Ca    7.4<L>      05 Dec 2018 05:59  Phos  2.1     12-05  Mg     1.9     12-05    TPro  4.1<L>  /  Alb  1.8<L>  /  TBili  2.9<H>  /  DBili  x   /  AST  36  /  ALT  19  /  AlkPhos  72  12-05    PT/INR - ( 04 Dec 2018 06:12 )   PT: 18.6 sec;   INR: 1.61 ratio         PTT - ( 04 Dec 2018 06:12 )  PTT:27.7 sec      RADIOLOGY & ADDITIONAL TESTS:

## 2018-12-05 NOTE — PROGRESS NOTE ADULT - PROBLEM SELECTOR PLAN 2
cont rifaximin  cont lactulose titrate for 3 soft bms/day  trend ammonia levels  monitor stool output

## 2018-12-05 NOTE — DISCHARGE NOTE ADULT - HOME CARE AGENCY
[facility] at Home and [facility].  A RN will visit your home within 24-48 hours post-discharge [facility] and [facility].  A RN will visit your home within 24 hours post-discharge

## 2018-12-05 NOTE — DISCHARGE NOTE ADULT - CARE PLAN
Principal Discharge DX:	GIB (gastrointestinal bleeding)  Goal:	improvement of symptoms Principal Discharge DX:	GIB (gastrointestinal bleeding)  Goal:	improvement of symptoms  Assessment and plan of treatment:	you were taken care of in the ICU. You were treated for a GI bleed. During your hospital stay, you had multiple transfusions to maintain your Hgb. You had 2 endoscopy's to find a source of bleed. You were found to have a bleeding duodenal ulcer. This was stabilized. Please be sure to take Carafate and pantoprazole as prescribed. Please follow up with your GI doctor, Dr. Morales, within 1 week of hospital discharge.  Secondary Diagnosis:	Diabetes  Assessment and plan of treatment:	please continue using your Trojeo as prescribed. Please follow up with Dr. Craig Perlman, the endocrinologist that took care of you in the hospital, within 1 week of hospital discharge.  Secondary Diagnosis:	E coli bacteremia  Assessment and plan of treatment:	A PICC line was placed in your right arm for IV antibiotics to be given at home for a total of 10 days (stop date 12/18/19). Please follow up with your doctor within 1 week of hospital discharge.  Secondary Diagnosis:	Hypertension  Assessment and plan of treatment:	please stop taking irbesartan as your BP has been running low. Please follow up with your doctor within 1 week of hospital discharge to see when it is safe to re-start the medication.  Secondary Diagnosis:	Hypercholesteremia  Assessment and plan of treatment:	please continue pravastatin as prescribed. Please follow up with your doctor within 1 week of hospital discharge.  Secondary Diagnosis:	Fatty liver disease, nonalcoholic  Assessment and plan of treatment:	please make sure to take lactulose and Xifaxan as prescribed. Please also make sure to eat a diet low in protein. Please follow up with your doctor within 1 week of hospital discharge. Principal Discharge DX:	GIB (gastrointestinal bleeding)  Goal:	improvement of symptoms  Assessment and plan of treatment:	you were taken care of in the ICU. You were treated for a GI bleed. During your hospital stay, you had multiple transfusions to maintain your Hgb. You had 2 endoscopy' s to find a source of bleed. You were found to have a bleeding duodenal ulcer. This was stabilized. Please be sure to take Carafate and pantoprazole as prescribed. Please follow up with your GI doctor, Dr. Morales, within 1 week of hospital discharge.  Secondary Diagnosis:	Diabetes  Assessment and plan of treatment:	please continue using your Trojeo as prescribed. Please do not take metformin at this time. Please follow up with Dr. Craig Perlman, the endocrinologist that took care of you in the hospital, within 1 week of hospital discharge.  Secondary Diagnosis:	E coli bacteremia  Assessment and plan of treatment:	A PICC line was placed in your right arm for IV antibiotics to be given at home for a total of 10 days (stop date 12/18/19). Please follow up with your doctor within 1 week of hospital discharge.  Secondary Diagnosis:	Hypertension  Assessment and plan of treatment:	please stop taking irbesartan as your BP has been running low. Please follow up with your doctor within 1 week of hospital discharge to see when it is safe to re-start the medication.  Secondary Diagnosis:	Hypercholesteremia  Assessment and plan of treatment:	please continue pravastatin as prescribed. Please follow up with your doctor within 1 week of hospital discharge.  Secondary Diagnosis:	Fatty liver disease, nonalcoholic  Assessment and plan of treatment:	please make sure to take lactulose and Xifaxan as prescribed. Please also make sure to eat a diet low in protein. Please follow up with your doctor within 1 week of hospital discharge. Principal Discharge DX:	GIB (gastrointestinal bleeding)  Goal:	improvement of symptoms  Assessment and plan of treatment:	you were taken care of in the ICU. You were treated for a GI bleed. During your hospital stay, you had multiple transfusions to maintain your Hgb. You had 2 endoscopy' s to find a source of bleed. You were found to have a bleeding duodenal ulcer. This was stabilized.   -Please be sure to take Carafate 4x day and pantoprazole 2x day for 30 days as prescribed, followed by daily Protonix & Carafate as per Dr Morales.   - Please follow up with your GI doctor, Dr. Morales, within 1 week of hospital discharge.  -Repeat EGD-Endoscopy in 4-6 weeks as out pt with GI  -Repeat CBC in 1week with PMD DR Coto  Secondary Diagnosis:	Diabetes  Assessment and plan of treatment:	please continue using your Lantus 50 Units daily at bed time  as prescribed.  - Please DO NOT  take metformin at home. FSBS Before each meals to adjust Pre meals Humalog Insulin  Please d/w your Endocrinologist to adjust your Lantus & Pre meals Humalog Insulin doses.  -Please follow up with Dr. Craig Perlman, the endocrinologist that took care of you in the hospital, within 1 week of hospital discharge.  Neuropathy- Gabapentin 200 mg 4x day, follow with Neurology DR Montana in 2 weeks  Secondary Diagnosis:	E coli bacteremia  Assessment and plan of treatment:	A PICC line was placed in your right arm for IV antibiotics-INVANZ  to be given at home for a total of 10 days (stop date 12/18/19).   - Do NOT wet your MID LINE   -Please follow up with your doctor within 1 week of hospital discharge.  Secondary Diagnosis:	Hypertension  Assessment and plan of treatment:	- Take Lasix 20 mg 1 tab daily with your Potassium 1 tab daily  -please STOP taking irbesartan as your BP has been running low.  - Please follow up with your doctor within 1 week of hospital discharge to see when it is safe to re-start the medication.  Secondary Diagnosis:	Hypercholesteremia  Assessment and plan of treatment:	please continue pravastatin as prescribed. Please follow up with your doctor within 1 week of hospital discharge. Follow CMP in 1 week with PMD  Secondary Diagnosis:	Fatty liver disease, nonalcoholic  Assessment and plan of treatment:	please make sure to take lactulose and Xifaxan as prescribed.  - Please also make sure to eat a diet low in protein. Please follow up with your doctor within 1 week of hospital discharge.  Secondary Diagnosis:	Anemia  Assessment and plan of treatment:	Anemia -2/2 Bleeding Duodenal Ulcer  you got 11 u pRBC in Total transfusions,   -CBC in 1 week with  DR Rios PMD  Thrombocytopenia -chronic, stable 2/2 Liver disease

## 2018-12-05 NOTE — DISCHARGE NOTE ADULT - CARE PROVIDER_API CALL
Javy Morales (DO), Gastroenterology; Internal Medicine  [address] [place] [address]  Phone: [phone]  Fax: [phone]    Hon LOGAN Rios (MD), Hematology; Internal Medicine; Medical Oncology  [address] [facility]  [address]  [place] [address]  Phone: [phone]  Fax: [phone]    Ivanna Montana (MD), Neurology  [address] [place] [address]  Phone: [phone]  Fax: [phone]    Perlman, Craig D (DO), Medicine  [address] [place] [address]  [address]  [place] [address]  Phone: [phone]  Fax: [phone] Javy Morales (DO), Gastroenterology; Internal Medicine  [address] [place] [address]  Phone: [phone]  Fax: [phone]    Hon LOGAN Rios (MD), Hematology; Internal Medicine; Medical Oncology  [address] [facility]  [address]  [place] [address]  Phone: [phone]  Fax: [phone]    Perlman, Craig D (DO), Medicine  [address] [place] [address]  [address]  [place] [address]  Phone: [phone]  Fax: [phone]    Bianca Araiza), Neurology  [address] [place] [address]  Phone: [phone]  Fax: [phone] Javy Morales (DO), Gastroenterology; Internal Medicine  [address] [place] [address]  Phone: [phone]  Fax: [phone]    Hon LOGAN Rios (MD), Hematology; Internal Medicine; Medical Oncology  [address] [facility]  [address]  [place] [address]  Phone: [phone]  Fax: [phone]    Perlman, Craig D (DO), Medicine  [address] [place] [address]  [address]  [place] [address]  Phone: [phone]  Fax: [phone]    Bianca Araiza), Neurology  [address] [place] [address]  Phone: [phone]  Fax: [phone]

## 2018-12-05 NOTE — CHART NOTE - NSCHARTNOTEFT_GEN_A_CORE
Assessment/ Follow Up:   62M with PMH of HTN, HLD, obesity, nephrolithiasis (last stone 25 years ago), DM type 2, diabetic neuropathy, HE, NAFLD, presents with vomiting and near syncope/falling out of a moving car and admitted for observation. Subsequently had large bloody BMs. Transferred to ICU for UGIB. s/p EGD showing large bleeding duodenal ulcer. Repeat bedside EGD showed large bleeding ulcer with clipx2, epi, and cautery. s/p 10PRBC/4FFP/2Platelet.  Attempted to speak with pt for assessment of diet education needs.  Pt presents as somewhat confused, not answering questions appropriately, kept stating "I'm so winded".  NH3 remains elevated which may account for confusion.  On Lactulose. Seen s/p lunch, plate waste inspection reveals pt ate 100% entree and wanted dessert. No problems chewing or swallowing noted. Low K+ and Phos noted- recommend repletion.      Factors impacting intake: [x] none [ ] nausea  [ ] vomiting [ ] diarrhea [ ] constipation  [ ]chewing problems [ ] swallowing issues  [ ] other:     Diet Presciption: Diet, Consistent Carbohydrate w/Evening Snack:   DASH/TLC {Sodium & Cholesterol Restricted} (12-05-18 @ 13:21)    Intake: ate 100% lunch    Current Weight: 12/5 329.3#    Pertinent Medications: MEDICATIONS  (STANDING):  atorvastatin 10 milliGRAM(s) Oral at bedtime  dextrose 5%. 1000 milliLiter(s) (50 mL/Hr) IV Continuous <Continuous>  dextrose 50% Injectable 12.5 Gram(s) IV Push once  dextrose 50% Injectable 25 Gram(s) IV Push once  dextrose 50% Injectable 25 Gram(s) IV Push once  gabapentin 200 milliGRAM(s) Oral four times a day  insulin glargine Injectable (LANTUS) 50 Unit(s) SubCutaneous at bedtime  insulin lispro (HumaLOG) corrective regimen sliding scale   SubCutaneous Before meals and at bedtime  lactulose Syrup 15 Gram(s) Oral two times a day  pantoprazole    Tablet 40 milliGRAM(s) Oral two times a day  rifaximin 550 milliGRAM(s) Oral two times a day  sucralfate 1 Gram(s) Oral every 6 hours  ursodiol Capsule 300 milliGRAM(s) Oral three times a day    MEDICATIONS  (PRN):  dextrose 40% Gel 15 Gram(s) Oral once PRN Blood Glucose LESS THAN 70 milliGRAM(s)/deciliter  glucagon  Injectable 1 milliGRAM(s) IntraMuscular once PRN Glucose LESS THAN 70 milligrams/deciliter  lidocaine 2% Gel 1 Application(s) Topical every 3 hours PRN pain    Pertinent Labs: 12-05 Na140 mmol/L Glu 65 mg/dL<L> K+ 3.4 mmol/L<L> Cr  0.98 mg/dL BUN 29 mg/dL<H> 12-05 Phos 2.1 mg/dL<L> 12-05 Alb 1.8 g/dL<L> 12-01 [id number] 8.3 %<H>     CAPILLARY BLOOD GLUCOSE      POCT Blood Glucose.: 125 mg/dL (05 Dec 2018 12:02)  POCT Blood Glucose.: 86 mg/dL (05 Dec 2018 07:42)  POCT Blood Glucose.: 166 mg/dL (04 Dec 2018 21:05)  POCT Blood Glucose.: 167 mg/dL (04 Dec 2018 17:58)    Skin: no pressure injuries  Edema: 1+ pedal    Estimated Needs:   [x] no change since previous assessment  [ ] recalculated:     Previous Nutrition Diagnosis:     [x] Altered GI Function   [x] Altered nutrition related labs (Glu, NH3)     Nutrition Diagnosis is [x] ongoing  [ ] resolved [ ] not applicable     New Nutrition Diagnosis: [x] not applicable       Interventions:   Recommend  [x] Change Diet To: continue current diet as ordered with addition of low protein 2/2 encephalopathic symptoms.  [ ] Nutrition Supplement  [ ] Nutrition Support  [ ] Other:     Monitoring and Evaluation:   [x] PO intake [ x ] Tolerance to diet prescription [ x ] weights [ x ] labs[ x ] follow up per protocol  [x] other: bowel function Assessment/ Follow Up:   62M with PMH of HTN, HLD, obesity, nephrolithiasis (last stone 25 years ago), DM type 2, diabetic neuropathy, HE, NAFLD, presents with vomiting and near syncope/falling out of a moving car and admitted for observation. Subsequently had large bloody BMs. Transferred to ICU for UGIB. s/p EGD showing large bleeding duodenal ulcer. Repeat bedside EGD showed large bleeding ulcer with clipx2, epi, and cautery. s/p 10PRBC/4FFP/2Platelet.  Attempted to speak with pt for assessment of diet education needs.  Pt presents as somewhat confused, not answering questions appropriately, kept stating "I'm so winded".  NH3 remains elevated which may account for confusion.  On Lactulose. Seen s/p lunch, plate waste inspection reveals pt ate 100% entree and wanted dessert. No problems chewing or swallowing noted. Low K+ and Phos noted- recommend repletion.      Factors impacting intake: [x] none [ ] nausea  [ ] vomiting [ ] diarrhea [ ] constipation  [ ]chewing problems [ ] swallowing issues  [ ] other:     Diet Presciption: Diet, Consistent Carbohydrate w/Evening Snack:   DASH/TLC {Sodium & Cholesterol Restricted} (12-05-18 @ 13:21)    Intake: ate 100% lunch    Current Weight: 12/5 329.3#    Pertinent Medications: MEDICATIONS  (STANDING):  atorvastatin 10 milliGRAM(s) Oral at bedtime  dextrose 5%. 1000 milliLiter(s) (50 mL/Hr) IV Continuous <Continuous>  dextrose 50% Injectable 12.5 Gram(s) IV Push once  dextrose 50% Injectable 25 Gram(s) IV Push once  dextrose 50% Injectable 25 Gram(s) IV Push once  gabapentin 200 milliGRAM(s) Oral four times a day  insulin glargine Injectable (LANTUS) 50 Unit(s) SubCutaneous at bedtime  insulin lispro (HumaLOG) corrective regimen sliding scale   SubCutaneous Before meals and at bedtime  lactulose Syrup 15 Gram(s) Oral two times a day  pantoprazole    Tablet 40 milliGRAM(s) Oral two times a day  rifaximin 550 milliGRAM(s) Oral two times a day  sucralfate 1 Gram(s) Oral every 6 hours  ursodiol Capsule 300 milliGRAM(s) Oral three times a day    MEDICATIONS  (PRN):  dextrose 40% Gel 15 Gram(s) Oral once PRN Blood Glucose LESS THAN 70 milliGRAM(s)/deciliter  glucagon  Injectable 1 milliGRAM(s) IntraMuscular once PRN Glucose LESS THAN 70 milligrams/deciliter  lidocaine 2% Gel 1 Application(s) Topical every 3 hours PRN pain    Pertinent Labs: 12-05 Na140 mmol/L Glu 65 mg/dL<L> K+ 3.4 mmol/L<L> Cr  0.98 mg/dL BUN 29 mg/dL<H> 12-05 Phos 2.1 mg/dL<L> 12-05 Alb 1.8 g/dL<L> 12-01 [id number] 8.3 %<H>     CAPILLARY BLOOD GLUCOSE      POCT Blood Glucose.: 125 mg/dL (05 Dec 2018 12:02)  POCT Blood Glucose.: 86 mg/dL (05 Dec 2018 07:42)  POCT Blood Glucose.: 166 mg/dL (04 Dec 2018 21:05)  POCT Blood Glucose.: 167 mg/dL (04 Dec 2018 17:58)    Skin: no pressure injuries  Edema: 1+ pedal    Estimated Needs:   [x] no change since previous assessment  [ ] recalculated:     Previous Nutrition Diagnosis:     [x] Altered GI Function   [x] Altered nutrition related labs (Glu, NH3)     Nutrition Diagnosis is [x] ongoing  [ ] resolved [ ] not applicable     New Nutrition Diagnosis: [x] not applicable       Interventions:   Recommend  [x] Change Diet To: continue current diet as ordered with addition of low protein 2/2 encephalopathic symptoms, consider adding GERD diet 2/2 duodenal ulcer.   [ ] Nutrition Supplement  [ ] Nutrition Support  [ ] Other:     Monitoring and Evaluation:   [x] PO intake [ x ] Tolerance to diet prescription [ x ] weights [ x ] labs[ x ] follow up per protocol  [x] other: bowel function

## 2018-12-05 NOTE — PROGRESS NOTE ADULT - SUBJECTIVE AND OBJECTIVE BOX
INTERVAL HPI/OVERNIGHT EVENTS:  pt seen and examined  denies n/v/abd pain, tolerating clears  per overnight rn w dark bm x1 overnight no brbpr no hematemesis  labs noted vs reviewed    MEDICATIONS  (STANDING):  atorvastatin 10 milliGRAM(s) Oral at bedtime  dextrose 5%. 1000 milliLiter(s) (50 mL/Hr) IV Continuous <Continuous>  dextrose 50% Injectable 12.5 Gram(s) IV Push once  dextrose 50% Injectable 25 Gram(s) IV Push once  dextrose 50% Injectable 25 Gram(s) IV Push once  gabapentin 200 milliGRAM(s) Oral four times a day  insulin glargine Injectable (LANTUS) 50 Unit(s) SubCutaneous at bedtime  insulin lispro (HumaLOG) corrective regimen sliding scale   SubCutaneous Before meals and at bedtime  lactulose Syrup 15 Gram(s) Oral two times a day  pantoprazole    Tablet 40 milliGRAM(s) Oral two times a day  rifaximin 550 milliGRAM(s) Oral two times a day  sucralfate 1 Gram(s) Oral every 6 hours  ursodiol Capsule 300 milliGRAM(s) Oral three times a day    MEDICATIONS  (PRN):  dextrose 40% Gel 15 Gram(s) Oral once PRN Blood Glucose LESS THAN 70 milliGRAM(s)/deciliter  glucagon  Injectable 1 milliGRAM(s) IntraMuscular once PRN Glucose LESS THAN 70 milligrams/deciliter  lidocaine 2% Gel 1 Application(s) Topical every 3 hours PRN pain      Allergies    codeine (Anaphylaxis)    Intolerances        Review of Systems:    General:  No wt loss, fevers, chills, night sweats, fatigue   Eyes:  Good vision, no reported pain  ENT:  No sore throat, pain, runny nose, dysphagia  CV:  No pain, palpitations, hypo/hypertension  Resp:  No dyspnea, cough, tachypnea, wheezing  GI:  No pain, No nausea, No vomiting, No diarrhea, No constipation, No weight loss, No fever, No pruritis, No rectal bleeding, ?melena, No dysphagia  :  No pain, bleeding, incontinence, nocturia  Muscle:  No pain, weakness  Neuro:  No weakness, tingling, memory problems  Psych:  No fatigue, insomnia, mood problems, depression  Endocrine:  No polyuria, polydypsia, cold/heat intolerance  Heme:  No petechiae, ecchymosis, easy bruisability  Skin:  No rash, tattoos, scars, edema      Vital Signs Last 24 Hrs  T(C): 37.3 (05 Dec 2018 08:37), Max: 37.3 (05 Dec 2018 08:37)  T(F): 99.1 (05 Dec 2018 08:37), Max: 99.1 (05 Dec 2018 08:37)  HR: 101 (05 Dec 2018 08:00) (94 - 104)  BP: 104/57 (05 Dec 2018 08:00) (102/51 - 138/63)  BP(mean): 77 (05 Dec 2018 08:00) (73 - 91)  RR: 25 (05 Dec 2018 08:00) (12 - 31)  SpO2: 94% (05 Dec 2018 08:00) (92% - 97%)    PHYSICAL EXAM:    Constitutional: NAD   HEENT: ncat  Neck: No LAD,  Respiratory: dec bs  Cardiovascular: S1 and S2, RRR  Gastrointestinal: obese abd soft nt mild dt  Extremities: No peripheral edema  Vascular: 2+ peripheral pulses  Neurological: awake alert responds appropriately but lethargic  Skin: jaundice      LABS:                        7.8    9.35  )-----------( 106      ( 05 Dec 2018 05:59 )             22.5     12-05    140  |  104  |  29<H>  ----------------------------<  65<L>  3.4<L>   |  29  |  0.98    Ca    7.4<L>      05 Dec 2018 05:59  Phos  2.1     12-05  Mg     1.9     12-05    TPro  4.1<L>  /  Alb  1.8<L>  /  TBili  2.9<H>  /  DBili  x   /  AST  36  /  ALT  19  /  AlkPhos  72  12-05    PT/INR - ( 04 Dec 2018 06:12 )   PT: 18.6 sec;   INR: 1.61 ratio         PTT - ( 04 Dec 2018 06:12 )  PTT:27.7 sec      RADIOLOGY & ADDITIONAL TESTS:

## 2018-12-05 NOTE — DISCHARGE NOTE ADULT - INSTRUCTIONS
diet and activity as tolerated.  please eat a diet low in sugar and fats. diet and activity as tolerated.  please eat a diet low in sugar , diabetic and Low fats.

## 2018-12-05 NOTE — DISCHARGE NOTE ADULT - MEDICATION SUMMARY - MEDICATIONS TO STOP TAKING
I will STOP taking the medications listed below when I get home from the hospital:    irbesartan 300 mg oral tablet  -- 1 tab(s) by mouth once a day    HumaLOG 100 units/mL subcutaneous solution  -- 20 unit(s) subcutaneous 2 times a day(breakfast and lunch)    HumaLOG 100 units/mL subcutaneous solution  -- 30 unit(s) subcutaneous once a day(@dinner)    Eli[employer] SoloStar 300 units/mL subcutaneous solution  -- 50 unit(s) subcutaneous once a day (at bedtime)   -- Check with your doctor before becoming pregnant.  Do not drink alcoholic beverages when taking this medication.  Expires___________________  It is very important that you take or use this exactly as directed.  Do not skip doses or discontinue unless directed by your doctor.  Keep in refrigerator.  Do not freeze.  Obtain medical advice before taking any non-prescription drugs as some may affect the action of this medication.  This drug may impair the ability to drive or operate machinery.  Use care until you become familiar with its effects.

## 2018-12-05 NOTE — DISCHARGE NOTE ADULT - HOSPITAL COURSE
Pt is a 61 y/o M with PMHx of Type 2 DM, HTN, HLD, obesity, nephrolithiasis (last stone 25 years ago), neuropathy, HE, NAFLD, who presents with vomiting and syncope/falling out of a moving car. Pt states that [place] and felt "off", his vision was cloudy, he pulled over the car and stopped and vomited. Vomitus was "creamy white." Pt denies seeing blood in vomitus. His dtr then took over driving and Pt again felt like he needed to vomit and told dtr to pull over. He thought the car had stopped and he opened the door and fell out of the moving car. He laid on the ground for ~ 15 minutes. Pt says he possibly lost consciousness. States when he was lying on the ground felt very cold. Pt has had "terrible heartburn" for 2 days and felt chills for 2 days. Pt denies abdominal pain, CP, palp, SOB, cough, myalgias, rash, HA. Pt admits to chills, vomiting (4 episodes), diarrhea (from lactulose), dizziness, pedal edema, tingling in hands (chronic, intermittent). Pt denies recent travel, sick contacts, different eating patterns.     In ED Pt's vitals were: T(C): 36.3, HR: 91, BP: 85/59, RR: 15, SpO2: 98% on RA  Labs significant for lactate 5.6, , Hgb 10.4, total bili 3.7, alk phos 183, ammonia 72, Mg 1.4. UA - trace LE, mod blood, RBC 6-10, urine glucose 1,000.   CXR - negative chest  Abd Xray - There is mild content in the colon and no overt sense of bowel obstruction.  CT head - no intracranial hemorrhage or mass effect  CT cervical - Multilevel degenerative disc disease/cervical spondylosis, with posterior osteophyte disc complex C6-7 resulting in flattening of the cervical cord. If there is a clinical concern for spinal cord injury, recommend further evaluation with MRI if there are no clinical contraindications.      s/p EGD showing large du with large adherent clot --- ~15 cc 1:15724 epinephrine was injected with hemostasis achieved.    EGD shows large duodenal ulcer s/p 2 clips, epi and cautery.   Patient became febrile, blood cultures ESBL E.Coli.   ID consulted- started on Invanz for total of 10 days (stop date 12/18/19)  Total Blood Given - 11 units pRBC, 4 units FFP, 2 units platelets  PICC line placed for IV Invanz for total of 10 days (stop date 12/18/18). Pt is a 61 y/o M with PMHx of Type 2 DM, HTN, HLD, obesity, nephrolithiasis (last stone 25 years ago), neuropathy, HE, NAFLD, who presents with vomiting and syncope/falling out of a moving car. Pt states that [place] and felt "off", his vision was cloudy, he pulled over the car and stopped and vomited. Vomitus was "creamy white." Pt denies seeing blood in vomitus. His dtr then took over driving and Pt again felt like he needed to vomit and told dtr to pull over. He thought the car had stopped and he opened the door and fell out of the moving car. He laid on the ground for ~ 15 minutes. Pt says he possibly lost consciousness. States when he was lying on the ground felt very cold. Pt has had "terrible heartburn" for 2 days and felt chills for 2 days. Pt denies abdominal pain, CP, palp, SOB, cough, myalgias, rash, HA. Pt admits to chills, vomiting (4 episodes), diarrhea (from lactulose), dizziness, pedal edema, tingling in hands (chronic, intermittent). Pt denies recent travel, sick contacts, different eating patterns.     In ED Pt's vitals were: T(C): 36.3, HR: 91, BP: 85/59, RR: 15, SpO2: 98% on RA  Labs significant for lactate 5.6, , Hgb 10.4, total bili 3.7, alk phos 183, ammonia 72, Mg 1.4. UA - trace LE, mod blood, RBC 6-10, urine glucose 1,000.   CXR - negative chest  Abd Xray - There is mild content in the colon and no overt sense of bowel obstruction.  CT head - no intracranial hemorrhage or mass effect  CT cervical - Multilevel degenerative disc disease/cervical spondylosis, with posterior osteophyte disc complex C6-7 resulting in flattening of the cervical cord. If there is a clinical concern for spinal cord injury, recommend further evaluation with MRI if there are no clinical contraindications.      s/p EGD showing large du with large adherent clot --- ~15 cc 1:86388 epinephrine was injected with hemostasis achieved.    EGD shows large duodenal ulcer s/p 2 clips, epi and cautery.   Patient became febrile, blood cultures ESBL E.Coli.   ID consulted- started on Invanz for total of 10 days (stop date 12/18/19)  Total Blood Given - 11 units pRBC, 4 units FFP, 2 units platelets  PICC line placed for IV Invanz for total of 10 days (stop date 12/18/18).    patient refused [facility]. Patient wants to go home instead of [facility]. set up for homecare PT.  patient received 1 dose of antibiotics prior to D/C. Pt is a 61 y/o M with PMHx of Type 2 DM, HTN, HLD, obesity, nephrolithiasis (last stone 25 years ago), neuropathy, HE, NAFLD, who presents with vomiting and syncope/falling out of a moving car. Pt states that [place] and felt "off", his vision was cloudy, he pulled over the car and stopped and vomited. Vomitus was "creamy white." Pt denies seeing blood in vomitus. His dtr then took over driving and Pt again felt like he needed to vomit and told dtr to pull over. He thought the car had stopped and he opened the door and fell out of the moving car. He laid on the ground for ~ 15 minutes. Pt says he possibly lost consciousness. States when he was lying on the ground felt very cold. Pt has had "terrible heartburn" for 2 days and felt chills for 2 days. Pt denies abdominal pain, CP, palp, SOB, cough, myalgias, rash, HA. Pt admits to chills, vomiting (4 episodes), diarrhea (from lactulose), dizziness, pedal edema, tingling in hands (chronic, intermittent). Pt denies recent travel, sick contacts, different eating patterns.     In ED Pt's vitals were: T(C): 36.3, HR: 91, BP: 85/59, RR: 15, SpO2: 98% on RA  Labs significant for lactate 5.6, , Hgb 10.4, total bili 3.7, alk phos 183, ammonia 72, Mg 1.4. UA - trace LE, mod blood, RBC 6-10, urine glucose 1,000.   CXR - negative chest  Abd Xray - There is mild content in the colon and no overt sense of bowel obstruction.  CT head - no intracranial hemorrhage or mass effect  CT cervical - Multilevel degenerative disc disease/cervical spondylosis, with posterior osteophyte disc complex C6-7 resulting in flattening of the cervical cord. If there is a clinical concern for spinal cord injury, recommend further evaluation with MRI if there are no clinical contraindications.      s/p EGD showing large du with large adherent clot --- ~15 cc 1:60148 epinephrine was injected with hemostasis achieved.    EGD shows large duodenal ulcer s/p 2 clips, epi and cautery.   Patient became febrile, blood cultures ESBL E.Coli.   ID consulted- started on Invanz for total of 10 days (stop date 12/18/19)  Total Blood Given - 11 units pRBC, 4 units FFP, 2 units platelets  PICC line placed for IV Invanz for total of 10 days (stop date 12/18/18).    patient refused [facility]. Patient wants to go home instead of [facility]. set up for homecare PT.  patient received 1 dose of antibiotics prior to D/C. Pt is a 63 y/o M with PMHx of Type 2 DM, HTN, HLD, obesity, nephrolithiasis (last stone 25 years ago), neuropathy, HE, NAFLD, who presents with vomiting and syncope/falling out of a moving car. Pt states that [place] and felt "off", his vision was cloudy, he pulled over the car and stopped and vomited. Vomitus was "creamy white." Pt denies seeing blood in vomitus. His dtr then took over driving and Pt again felt like he needed to vomit and told dtr to pull over. He thought the car had stopped and he opened the door and fell out of the moving car. He laid on the ground for ~ 15 minutes. Pt says he possibly lost consciousness. States when he was lying on the ground felt very cold. Pt has had "terrible heartburn" for 2 days and felt chills for 2 days. Pt denies abdominal pain, CP, palp, SOB, cough, myalgias, rash, HA. Pt admits to chills, vomiting (4 episodes), diarrhea (from lactulose), dizziness, pedal edema, tingling in hands (chronic, intermittent). Pt denies recent travel, sick contacts, different eating patterns.     In ED Pt's vitals were: T(C): 36.3, HR: 91, BP: 85/59, RR: 15, SpO2: 98% on RA  Labs significant for lactate 5.6, , Hgb 10.4, total bili 3.7, alk phos 183, ammonia 72, Mg 1.4. UA - trace LE, mod blood, RBC 6-10, urine glucose 1,000.   CXR - negative chest  Abd Xray - There is mild content in the colon and no overt sense of bowel obstruction.  CT head - no intracranial hemorrhage or mass effect  CT cervical - Multilevel degenerative disc disease/cervical spondylosis, with posterior osteophyte disc complex C6-7 resulting in flattening of the cervical cord. If there is a clinical concern for spinal cord injury, recommend further evaluation with MRI if there are no clinical contraindications.      s/p EGD showing large du with large adherent clot --- ~15 cc 1:12984 epinephrine was injected with hemostasis achieved.    EGD shows large duodenal ulcer s/p 2 clips, epi and cautery.   Patient became febrile in the ICU, started on Zozyn by Dr. Rios. blood cultures later showed ESBL E.Coli and patient was  changed to Invanz for total of 10 days (stop date 12/18/19)  PICC line placed for IV Invanz for a total of 10 days therapy. (stop date 12/18/18)  Hospital course was complicated by persistent acute blood loss anemia, for which patient received a total of 11 units pRBC, 4 units FFP, 2 units platelets during the hospital course.   PT was consulted and deemed patient to be [facility] candidate. Patient refused [facility]. Patient wants to go home instead of [facility]. set up for homecare PT.  patient received 1 dose of antibiotics prior to D/C. Pt is a 61 y/o M with PMHx of Type 2 DM, HTN, HLD, obesity, nephrolithiasis (last stone 25 years ago), neuropathy, HE, NAFLD, who presents with vomiting and syncope/falling out of a moving car. Pt states that [place] and felt "off", his vision was cloudy, he pulled over the car and stopped and vomited. Vomitus was "creamy white." Pt denies seeing blood in vomitus. His dtr then took over driving and Pt again felt like he needed to vomit and told dtr to pull over. He thought the car had stopped and he opened the door and fell out of the moving car. He laid on the ground for ~ 15 minutes. Pt says he possibly lost consciousness. States when he was lying on the ground felt very cold. Pt has had "terrible heartburn" for 2 days and felt chills for 2 days. Pt denies abdominal pain, CP, palp, SOB, cough, myalgias, rash, HA. Pt admits to chills, vomiting (4 episodes), diarrhea (from lactulose), dizziness, pedal edema, tingling in hands (chronic, intermittent). Pt denies recent travel, sick contacts, different eating patterns.     In ED Pt's vitals were: T(C): 36.3, HR: 91, BP: 85/59, RR: 15, SpO2: 98% on RA  Labs significant for lactate 5.6, , Hgb 10.4, total bili 3.7, alk phos 183, ammonia 72, Mg 1.4. UA - trace LE, mod blood, RBC 6-10, urine glucose 1,000.   CXR - negative chest  Abd Xray - There is mild content in the colon and no overt sense of bowel obstruction.  CT head - no intracranial hemorrhage or mass effect  CT cervical - Multilevel degenerative disc disease/cervical spondylosis, with posterior osteophyte disc complex C6-7 resulting in flattening of the cervical cord. If there is a clinical concern for spinal cord injury, recommend further evaluation with MRI if there are no clinical contraindications.   Pt was admitted to [facility], pt started to have Bloody BM's, serial H/H were done, pt had drop in H/H, Pt had Hypotension with large Bloody BM,  ICU eval was called, pt was transferred to ICU, Pt was given pRBC's, Platelets, FFP & Vit K , GI Dr Franks saw pt,  s/p EGD showing large Duodenal Ulcer, bleedig with large adherent clot --- ~15 cc 1:80771 epinephrine was injected with hemostasis achieved. EGD shows large duodenal ulcer s/p 2 clips, epi and cautery. Serial CBC checked, pt remained hypotensive with tachycardia, pt had 2nd EGD done by Dr Morales for Low H/H, pt was found to have re bleeding  from  DU, s/p Epi & Clipping done again,  Patient became febrile in the ICU, started on Zozyn by Dr. Rios. blood cultures later showed ESBL E.Coli and patient was  changed to Invanz for total of 10 days (stop date 12/18/19)  PICC line placed for IV Invanz for a total of 10 days therapy. (stop date 12/18/18)  Hospital course was complicated by persistent acute blood loss anemia, for which patient received a total of 11 units pRBC, 4 units FFP, 2 units platelets during the hospital course.   PT was consulted and deemed patient to be [facility] candidate. Patient refused [facility]. Patient wants to go home instead of [facility]. set up for homecare PT.  patient received 1 dose of antibiotics prior to D/C. Pt is a 61 y/o M with PMHx of Type 2 DM, HTN, HLD, obesity, nephrolithiasis (last stone 25 years ago), neuropathy, HE, NAFLD, who presents with vomiting and syncope/falling out of a moving car. Pt states that [place] and felt "off", his vision was cloudy, he pulled over the car and stopped and vomited. Vomitus was "creamy white." Pt denies seeing blood in vomitus. His dtr then took over driving and Pt again felt like he needed to vomit and told dtr to pull over. He thought the car had stopped and he opened the door and fell out of the moving car. He laid on the ground for ~ 15 minutes. Pt says he possibly lost consciousness. States when he was lying on the ground felt very cold. Pt has had "terrible heartburn" for 2 days and felt chills for 2 days. Pt denies abdominal pain, CP, palp, SOB, cough, myalgias, rash, HA. Pt admits to chills, vomiting (4 episodes), diarrhea (from lactulose), dizziness, pedal edema, tingling in hands (chronic, intermittent). Pt denies recent travel, sick contacts, different eating patterns.     In ED Pt's vitals were: T(C): 36.3, HR: 91, BP: 85/59, RR: 15, SpO2: 98% on RA  Labs significant for lactate 5.6, , Hgb 10.4, total bili 3.7, alk phos 183, ammonia 72, Mg 1.4. UA - trace LE, mod blood, RBC 6-10, urine glucose 1,000.   CXR - negative chest  Abd Xray - There is mild content in the colon and no overt sense of bowel obstruction.  CT head - no intracranial hemorrhage or mass effect  CT cervical - Multilevel degenerative disc disease/cervical spondylosis, with posterior osteophyte disc complex C6-7 resulting in flattening of the cervical cord. If there is a clinical concern for spinal cord injury, recommend further evaluation with MRI if there are no clinical contraindications.   Pt was admitted to [facility], pt started to have Bloody BM's, serial H/H were done, pt had drop in H/H, Pt had Hypotension with large Bloody BM,  ICU eval was called, pt was transferred to ICU, Pt was given pRBC's, Platelets, FFP & Vit K , GI Dr Franks saw pt,  s/p EGD showing large Duodenal Ulcer, bleedig with large adherent clot --- ~15 cc 1:75349 epinephrine was injected with hemostasis achieved. EGD shows large duodenal ulcer s/p 2 clips, epi and cautery. Serial CBC checked, pt remained hypotensive with tachycardia, pt had 2nd EGD done by Dr Montalvo for Low H/H, pt was found to have re bleeding  from  DU, s/p Epi & Clipping done again, Bleeding stopped, H/H stable, BP stable , Pt seen By Dr montalvo & Neurology Dr Peña, Lactulose was restarted, Diet restarted in ICU.   Patient became febrile in the ICU, started on  IV abx Zozyn by Dr. Rios. blood cultures later showed ESBL E.Coli and patient was  changed to IV Invanz for total of 10 days (stop date 12/18/19) Repeat Blood cultures- NEG, urine c/s -NEG  MID line placed for IV Invanz for a total of 10 days therapy. (stop date 12/18/18)  Hospital course was complicated by persistent acute blood loss anemia, for which patient received a total of 11 units pRBC, 4 units FFP, 2 units platelets during the hospital course.   PT was consulted and deemed patient to be [facility] candidate. Patient does NOT want to go to  [facility]. Patient wants to go home instead of [facility]. set up for home care PT.  patient received 1 dose of antibiotics prior to D/C.

## 2018-12-05 NOTE — DISCHARGE NOTE ADULT - ADDITIONAL INSTRUCTIONS
please eat diet low in protein and fats and sugars.   activity as tolerated. please eat diet low in protein and fats and sugars.   activity as tolerated.  Follow with DR Morales in 1 week  Follow with PMD in 1 week  Follow with your Endocrinology in 1 week

## 2018-12-05 NOTE — PROGRESS NOTE ADULT - PROBLEM SELECTOR PLAN 1
- Bleeding Duodenal ulcer on Emergent 2 nd EGD on 12/3/ in ICU s/p clips x2, epi injection, and cautery. Pt had EGD done on Weekend,   - pt had multiple  bloody BM  2/2   rapid upper GI bleed   - Acute Blood loss Anemia with Bleeding DU - H/H Low stable  - hx of PUD, Esophagitis, Gastritis, non bleeding ulcers on last EGD10/18  - s/p 3 units FFP, 7 units pRBC, 1 platelets & Vit K 5 mg x 1 dose given 12/3  - GI ([facility]) following  - Dr Hernandez following  - If further bleeding ---> plan to transfer to [facility] for IR / surgical intervention  - c/w Zofran q6hr, IV Protonix  Drip, Carafate q 6 hrs   - clear liquid diet   - Serial CBC, -Coagulapathy with liver disease  - ICU care - Bleeding Duodenal ulcer on Emergent 2 nd EGD on 12/3/ in ICU s/p clips x2, epi injection, and cautery. Pt had EGD done on Weekend,   - pt had multiple  bloody BM  2/2   rapid upper GI bleed   - Acute Blood loss Anemia with Bleeding DU - H/H Low stable  - hx of PUD, Esophagitis, Gastritis, non bleeding ulcers on last EGD10/18  - s/p 3 units FFP, 7 units pRBC, 1 platelets & Vit K 5 mg x 1 dose given 12/3  - GI ([facility]) following  - Dr Hernandez following  - If further bleeding ---> plan to transfer to [facility] for IR / surgical intervention  - c/w Zofran q6hr, IV Protonix  Drip, Carafate q 6 hrs   - clear liquid diet----> Regular diet  - Serial CBC, -Coagulapathy with liver disease  - ICU care - Bleeding Duodenal ulcer on Emergent 2 nd EGD on 12/3/ in ICU s/p clips x2, epi injection, and cautery. Pt had EGD done on Weekend,   - pt had multiple  bloody BM  2/2   rapid upper GI bleed   - Acute Blood loss Anemia with Bleeding DU - H/H Low stable  - hx of PUD, Esophagitis, Gastritis, non bleeding ulcers on last EGD10/18  - s/p 3 units FFP, 7 units pRBC, 1 platelets & Vit K 5 mg x 1 dose given 12/3  - GI ([facility]) following  - Dr Hernandez following  - If further bleeding ---> plan to transfer to [facility] for IR / surgical intervention  - c/w Zofran q6hr, S/P  Protonix  Drip,  Change to Protonix 40 mg 2 xday ,Carafate q 6 hrs   - clear liquid diet----> Regular diet  - Serial CBC, -Coagulapathy with liver disease  - ICU care

## 2018-12-05 NOTE — PROGRESS NOTE ADULT - PROBLEM SELECTOR PLAN 1
ugib, resolving  sp egd- large du with clot, sp epi w hemostasis  sp repeat egd, 4 clips placed, injected w epi and cauterized   passed dark stool overnight, no s/s active gib, hgb stable, bun downtrending  serial cbc, transfuse prn  hold ac asa nsaids  cont ppi and carafate  surgery following  ok to advance diet as tolerated  monitor abd exam  if rebleeds may need surgery/IR  cont icu monitoring  plan dw attg, agreeable

## 2018-12-05 NOTE — PROGRESS NOTE ADULT - PROBLEM SELECTOR PLAN 3
see above, suspected JEAN  no ascites on CT  cont ppi, rifaximin, sandra, lactulose  trend lfts  low na/pro diet  supportive care  will follow

## 2018-12-05 NOTE — DISCHARGE NOTE ADULT - NSTOBACCOHOTLINE_GEN_A_CS
[facility] Smokers Quitline ([id number]) [facility] Smokers Quitline ([phone])

## 2018-12-05 NOTE — PROGRESS NOTE ADULT - PROBLEM SELECTOR PLAN 6
2/2 Cirrhosis -ammonia level improved ? 2/2 GI Bleed ; Pt not confused at baseline MS, no asterixis  -- High Ammonia level 53  on lactulose at home (not on rifaximin at home)   rifaximin 550 BID

## 2018-12-05 NOTE — DISCHARGE NOTE ADULT - NS AS ACTIVITY OBS
Walking-Indoors allowed/Walking-Outdoors allowed/Do not drive or operate machinery/No Heavy lifting/straining/DO NOT Wet MID Line/Bathing allowed

## 2018-12-05 NOTE — PROGRESS NOTE ADULT - ASSESSMENT
Pt is a 63 y/o M with PMHx of Type 2 DM, HTN, HLD, obesity, nephrolithiasis (last stone 25 years ago), neuropathy, HE, NAFLD, who presents with vomiting and near syncope/falling out of a moving car. Admitted for vomiting and near syncope. Likely Vasovagal, Acute blood Loss anemia, S/P ICU transfer for upper GI bleed S/P EGD showed bleeding Duodenal ulcer s/p clips x2, epi injection, and cautery. 3 units pRBC given 2 days ago . Patient has no acute complaints this AM.

## 2018-12-05 NOTE — PROGRESS NOTE ADULT - PROBLEM SELECTOR PLAN 3
-elevated Bili -?? Etiology Coagulopathy 2/2 Liver disease   Abnormal LFT's , Bili & PT/PTT/INR - Dr Rios -Hem Follow up  - pt follows GI (Dr. Morales)   -cont ppi, sandra, rifaximin bid  - f/u CMP in AM -elevated Bili -?? Etiology Coagulopathy 2/2 Liver disease   Abnormal LFT's , Bili & PT/PTT/INR - Dr Rios -Hem Follow up  Lactulose 2x day   - pt follows GI (Dr. Morales)   -cont ppi, sandra, rifaximin bid  - f/u CMP in AM

## 2018-12-05 NOTE — PROGRESS NOTE ADULT - ASSESSMENT
[ASSESSMENT and  PLAN]  61 yo man w JEAN/NAFLD assoc cirrhosis, baseline bilirubin 2.7, adm after near syncope and found  anemia with GI bleed due to large duodenal ulcer; s/p EGD and injection of ulcer with epinephrine  Also history hx of coagulopathy due to cirrhosis and baseline thrombocytopenia with cirrhosis (platelet count usually 90K).     -CBC stable  -B12/folate/iron  adequate  -stable mild incr INR due to liver disease  -symptomatic management from Heme standpoint, continue monitor serial CBC 61 yo man w JEAN/NAFLD assoc cirrhosis, baseline bilirubin 2.7, adm after near syncope and found  anemia with GI bleed due to large duodenal ulcer; s/p EGD and injection of ulcer with epinephrine  Also history hx of coagulopathy due to cirrhosis and baseline thrombocytopenia with cirrhosis (platelet count usually 90K, INR 1.6).     -CBC stable  -B12/folate/iron  adequate  -stable mild incr INR due to liver disease  -symptomatic management from Heme standpoint, continue monitor serial CBC  -transfusion support PRN prior to procedures , keep HB above 7  - signing off , please reconsult as needed

## 2018-12-06 DIAGNOSIS — R50.9 FEVER, UNSPECIFIED: ICD-10-CM

## 2018-12-06 LAB
ALBUMIN SERPL ELPH-MCNC: 1.8 G/DL — LOW (ref 3.3–5)
ALP SERPL-CCNC: 73 U/L — SIGNIFICANT CHANGE UP (ref 40–120)
ALT FLD-CCNC: 21 U/L — SIGNIFICANT CHANGE UP (ref 12–78)
ANION GAP SERPL CALC-SCNC: 7 MMOL/L — SIGNIFICANT CHANGE UP (ref 5–17)
APPEARANCE UR: CLEAR — SIGNIFICANT CHANGE UP
AST SERPL-CCNC: 29 U/L — SIGNIFICANT CHANGE UP (ref 15–37)
BASOPHILS # BLD AUTO: 0.02 K/UL — SIGNIFICANT CHANGE UP (ref 0–0.2)
BASOPHILS NFR BLD AUTO: 0.2 % — SIGNIFICANT CHANGE UP (ref 0–2)
BILIRUB SERPL-MCNC: 2.7 MG/DL — HIGH (ref 0.2–1.2)
BILIRUB UR-MCNC: ABNORMAL
BUN SERPL-MCNC: 29 MG/DL — HIGH (ref 7–23)
CALCIUM SERPL-MCNC: 7 MG/DL — LOW (ref 8.5–10.1)
CHLORIDE SERPL-SCNC: 104 MMOL/L — SIGNIFICANT CHANGE UP (ref 96–108)
CO2 SERPL-SCNC: 28 MMOL/L — SIGNIFICANT CHANGE UP (ref 22–31)
COLOR SPEC: YELLOW — SIGNIFICANT CHANGE UP
CREAT SERPL-MCNC: 1.3 MG/DL — SIGNIFICANT CHANGE UP (ref 0.5–1.3)
DIFF PNL FLD: NEGATIVE — SIGNIFICANT CHANGE UP
E COLI DNA BLD POS QL NAA+NON-PROBE: SIGNIFICANT CHANGE UP
EOSINOPHIL # BLD AUTO: 0.16 K/UL — SIGNIFICANT CHANGE UP (ref 0–0.5)
EOSINOPHIL NFR BLD AUTO: 1.9 % — SIGNIFICANT CHANGE UP (ref 0–6)
GLUCOSE SERPL-MCNC: 119 MG/DL — HIGH (ref 70–99)
GLUCOSE UR QL: NEGATIVE — SIGNIFICANT CHANGE UP
GRAM STN FLD: SIGNIFICANT CHANGE UP
HCT VFR BLD CALC: 22 % — LOW (ref 39–50)
HCT VFR BLD CALC: 25.9 % — LOW (ref 39–50)
HGB BLD-MCNC: 7.4 G/DL — LOW (ref 13–17)
HGB BLD-MCNC: 8.6 G/DL — LOW (ref 13–17)
IMM GRANULOCYTES NFR BLD AUTO: 1.4 % — SIGNIFICANT CHANGE UP (ref 0–1.5)
KETONES UR-MCNC: NEGATIVE — SIGNIFICANT CHANGE UP
LEUKOCYTE ESTERASE UR-ACNC: NEGATIVE — SIGNIFICANT CHANGE UP
LYMPHOCYTES # BLD AUTO: 1.63 K/UL — SIGNIFICANT CHANGE UP (ref 1–3.3)
LYMPHOCYTES # BLD AUTO: 18.9 % — SIGNIFICANT CHANGE UP (ref 13–44)
MAGNESIUM SERPL-MCNC: 1.7 MG/DL — SIGNIFICANT CHANGE UP (ref 1.6–2.6)
MCHC RBC-ENTMCNC: 30.5 PG — SIGNIFICANT CHANGE UP (ref 27–34)
MCHC RBC-ENTMCNC: 33.6 GM/DL — SIGNIFICANT CHANGE UP (ref 32–36)
MCV RBC AUTO: 90.5 FL — SIGNIFICANT CHANGE UP (ref 80–100)
METHOD TYPE: SIGNIFICANT CHANGE UP
MONOCYTES # BLD AUTO: 0.71 K/UL — SIGNIFICANT CHANGE UP (ref 0–0.9)
MONOCYTES NFR BLD AUTO: 8.2 % — SIGNIFICANT CHANGE UP (ref 2–14)
NEUTROPHILS # BLD AUTO: 5.99 K/UL — SIGNIFICANT CHANGE UP (ref 1.8–7.4)
NEUTROPHILS NFR BLD AUTO: 69.4 % — SIGNIFICANT CHANGE UP (ref 43–77)
NITRITE UR-MCNC: NEGATIVE — SIGNIFICANT CHANGE UP
PH UR: 5 — SIGNIFICANT CHANGE UP (ref 5–8)
PHOSPHATE SERPL-MCNC: 2.8 MG/DL — SIGNIFICANT CHANGE UP (ref 2.5–4.5)
PLATELET # BLD AUTO: 88 K/UL — LOW (ref 150–400)
POTASSIUM SERPL-MCNC: 3.6 MMOL/L — SIGNIFICANT CHANGE UP (ref 3.5–5.3)
POTASSIUM SERPL-SCNC: 3.6 MMOL/L — SIGNIFICANT CHANGE UP (ref 3.5–5.3)
PROT SERPL-MCNC: 4.3 G/DL — LOW (ref 6–8.3)
PROT UR-MCNC: 25 MG/DL
RBC # BLD: 2.43 M/UL — LOW (ref 4.2–5.8)
RBC # FLD: 18.6 % — HIGH (ref 10.3–14.5)
SODIUM SERPL-SCNC: 139 MMOL/L — SIGNIFICANT CHANGE UP (ref 135–145)
SP GR SPEC: 1.01 — SIGNIFICANT CHANGE UP (ref 1.01–1.02)
SPECIMEN SOURCE: SIGNIFICANT CHANGE UP
SPECIMEN SOURCE: SIGNIFICANT CHANGE UP
UROBILINOGEN FLD QL: 4
WBC # BLD: 8.63 K/UL — SIGNIFICANT CHANGE UP (ref 3.8–10.5)
WBC # FLD AUTO: 8.63 K/UL — SIGNIFICANT CHANGE UP (ref 3.8–10.5)

## 2018-12-06 PROCEDURE — 71045 X-RAY EXAM CHEST 1 VIEW: CPT | Mod: 26

## 2018-12-06 PROCEDURE — 99233 SBSQ HOSP IP/OBS HIGH 50: CPT | Mod: GC

## 2018-12-06 RX ORDER — GLUCAGON INJECTION, SOLUTION 0.5 MG/.1ML
1 INJECTION, SOLUTION SUBCUTANEOUS ONCE
Qty: 0 | Refills: 0 | Status: DISCONTINUED | OUTPATIENT
Start: 2018-12-06 | End: 2018-12-11

## 2018-12-06 RX ORDER — DEXTROSE 50 % IN WATER 50 %
25 SYRINGE (ML) INTRAVENOUS ONCE
Qty: 0 | Refills: 0 | Status: DISCONTINUED | OUTPATIENT
Start: 2018-12-06 | End: 2018-12-11

## 2018-12-06 RX ORDER — INSULIN GLARGINE 100 [IU]/ML
50 INJECTION, SOLUTION SUBCUTANEOUS AT BEDTIME
Qty: 0 | Refills: 0 | Status: DISCONTINUED | OUTPATIENT
Start: 2018-12-06 | End: 2018-12-11

## 2018-12-06 RX ORDER — ACETAMINOPHEN 500 MG
650 TABLET ORAL EVERY 6 HOURS
Qty: 0 | Refills: 0 | Status: DISCONTINUED | OUTPATIENT
Start: 2018-12-06 | End: 2018-12-11

## 2018-12-06 RX ORDER — INSULIN LISPRO 100/ML
VIAL (ML) SUBCUTANEOUS AT BEDTIME
Qty: 0 | Refills: 0 | Status: DISCONTINUED | OUTPATIENT
Start: 2018-12-06 | End: 2018-12-10

## 2018-12-06 RX ORDER — SODIUM CHLORIDE 9 MG/ML
1000 INJECTION, SOLUTION INTRAVENOUS
Qty: 0 | Refills: 0 | Status: DISCONTINUED | OUTPATIENT
Start: 2018-12-06 | End: 2018-12-11

## 2018-12-06 RX ORDER — DEXTROSE 50 % IN WATER 50 %
12.5 SYRINGE (ML) INTRAVENOUS ONCE
Qty: 0 | Refills: 0 | Status: DISCONTINUED | OUTPATIENT
Start: 2018-12-06 | End: 2018-12-11

## 2018-12-06 RX ORDER — INSULIN LISPRO 100/ML
VIAL (ML) SUBCUTANEOUS
Qty: 0 | Refills: 0 | Status: DISCONTINUED | OUTPATIENT
Start: 2018-12-06 | End: 2018-12-10

## 2018-12-06 RX ORDER — PIPERACILLIN AND TAZOBACTAM 4; .5 G/20ML; G/20ML
3.38 INJECTION, POWDER, LYOPHILIZED, FOR SOLUTION INTRAVENOUS EVERY 8 HOURS
Qty: 0 | Refills: 0 | Status: DISCONTINUED | OUTPATIENT
Start: 2018-12-06 | End: 2018-12-09

## 2018-12-06 RX ORDER — DEXTROSE 50 % IN WATER 50 %
15 SYRINGE (ML) INTRAVENOUS ONCE
Qty: 0 | Refills: 0 | Status: DISCONTINUED | OUTPATIENT
Start: 2018-12-06 | End: 2018-12-11

## 2018-12-06 RX ORDER — MAGNESIUM SULFATE 500 MG/ML
2 VIAL (ML) INJECTION ONCE
Qty: 0 | Refills: 0 | Status: COMPLETED | OUTPATIENT
Start: 2018-12-06 | End: 2018-12-06

## 2018-12-06 RX ADMIN — LACTULOSE 15 GRAM(S): 10 SOLUTION ORAL at 05:33

## 2018-12-06 RX ADMIN — Medication 1 GRAM(S): at 11:14

## 2018-12-06 RX ADMIN — ATORVASTATIN CALCIUM 10 MILLIGRAM(S): 80 TABLET, FILM COATED ORAL at 21:30

## 2018-12-06 RX ADMIN — Medication 1 GRAM(S): at 23:17

## 2018-12-06 RX ADMIN — GABAPENTIN 200 MILLIGRAM(S): 400 CAPSULE ORAL at 11:14

## 2018-12-06 RX ADMIN — PANTOPRAZOLE SODIUM 40 MILLIGRAM(S): 20 TABLET, DELAYED RELEASE ORAL at 05:33

## 2018-12-06 RX ADMIN — INSULIN GLARGINE 50 UNIT(S): 100 INJECTION, SOLUTION SUBCUTANEOUS at 21:39

## 2018-12-06 RX ADMIN — URSODIOL 300 MILLIGRAM(S): 250 TABLET, FILM COATED ORAL at 11:14

## 2018-12-06 RX ADMIN — PANTOPRAZOLE SODIUM 40 MILLIGRAM(S): 20 TABLET, DELAYED RELEASE ORAL at 17:08

## 2018-12-06 RX ADMIN — PIPERACILLIN AND TAZOBACTAM 25 GRAM(S): 4; .5 INJECTION, POWDER, LYOPHILIZED, FOR SOLUTION INTRAVENOUS at 21:39

## 2018-12-06 RX ADMIN — Medication 1: at 11:51

## 2018-12-06 RX ADMIN — Medication 400 MILLIGRAM(S): at 00:03

## 2018-12-06 RX ADMIN — Medication 1 GRAM(S): at 17:08

## 2018-12-06 RX ADMIN — LACTULOSE 15 GRAM(S): 10 SOLUTION ORAL at 17:07

## 2018-12-06 RX ADMIN — Medication 650 MILLIGRAM(S): at 23:18

## 2018-12-06 RX ADMIN — URSODIOL 300 MILLIGRAM(S): 250 TABLET, FILM COATED ORAL at 21:29

## 2018-12-06 RX ADMIN — GABAPENTIN 200 MILLIGRAM(S): 400 CAPSULE ORAL at 17:08

## 2018-12-06 RX ADMIN — Medication 650 MILLIGRAM(S): at 21:30

## 2018-12-06 RX ADMIN — Medication 1 GRAM(S): at 05:34

## 2018-12-06 RX ADMIN — Medication 50 GRAM(S): at 07:46

## 2018-12-06 RX ADMIN — GABAPENTIN 200 MILLIGRAM(S): 400 CAPSULE ORAL at 05:33

## 2018-12-06 RX ADMIN — GABAPENTIN 200 MILLIGRAM(S): 400 CAPSULE ORAL at 23:18

## 2018-12-06 RX ADMIN — URSODIOL 300 MILLIGRAM(S): 250 TABLET, FILM COATED ORAL at 05:33

## 2018-12-06 NOTE — PROGRESS NOTE ADULT - PROBLEM SELECTOR PLAN 5
? compliant with insulin; Pt uses insulin samples due to high cost  -A1C -improved, still elevated  -Hypoglycemia Protocol, Low Dose Insulin Sliding Coverage, Lantus 22 units qhs, Accu-Cheks AC&HS.  -Diet: clear liquid diet  -Follow up HbA1c.  -Hold home medications: Humalog 20 breakfast & lunch, Humalog 30 dinner-pre meals ,  toujeo 50 u SC at night -A1C -improved, still elevated  -Hypoglycemia Protocol, Low Dose Insulin Sliding Coverage, Lantus 50 units qhs, Accu-Cheks AC&HS.  -Diet: Regular diet  .

## 2018-12-06 NOTE — PHYSICAL THERAPY INITIAL EVALUATION ADULT - PLANNED THERAPY INTERVENTIONS, PT EVAL
balance training/gait training/bed mobility training bed mobility training/balance training/transfer training/gait training

## 2018-12-06 NOTE — PROGRESS NOTE ADULT - PROBLEM SELECTOR PLAN 1
- Bleeding Duodenal ulcer on Emergent 2 nd EGD on 12/3/ in ICU s/p clips x2, epi injection, and cautery. Pt had EGD done on Weekend,   - pt had multiple  bloody BM  2/2   rapid upper GI bleed   - Acute Blood loss Anemia with Bleeding DU - H/H Low stable  - hx of PUD, Esophagitis, Gastritis, non bleeding ulcers on last EGD10/18  - s/p 3 units FFP, 7 units pRBC, 1 platelets & Vit K 5 mg x 1 dose given 12/3  - GI ([facility]) following  - Dr Hernandez following  - If further bleeding ---> plan to transfer to [facility] for IR / surgical intervention  - c/w Zofran q6hr, IV Protonix  Drip, Carafate q 6 hrs   - clear liquid diet----> Regular diet  - Serial CBC, -Coagulapathy with liver disease  - ICU care - Bleeding Duodenal ulcer on Emergent 2 nd EGD on 12/3/ in ICU s/p clips x2, epi injection, and cautery. Pt had EGD done on Weekend,   - pt had multiple  bloody BM  2/2   rapid upper GI bleed   - Acute Blood loss Anemia with Bleeding DU - H/H Low stable  - hx of PUD, Esophagitis, Gastritis, non bleeding ulcers on last EGD10/18  - s/p 3 units FFP, 7 units pRBC, 1 platelets & Vit K 5 mg x 1 dose given 12/3  - GI ([facility]) following  - Dr David dorsey: -  If further bleeding ---> plan to transfer to [facility] for IR / surgical intervention  - c/w Zofran q6hr, IV Protonix  Drip, Carafate q 6 hrs   - Diet advanced to Regular carbohydrate diet w/ evening snack  - Serial CBC, -Coagulapathy with liver disease  - ICU care - Bleeding Duodenal ulcer on Emergent 2 nd EGD on 12/3/ in ICU s/p clips x2, epi injection, and cautery. Pt had EGD done on Weekend,   - pt had multiple  bloody BM  2/2   rapid upper GI bleed   - Acute Blood loss Anemia with Bleeding DU - H/H Low stable  - hx of PUD, Esophagitis, Gastritis, non bleeding ulcers on last EGD10/18  - s/p 3 units FFP, 7 units pRBC, 1 platelets & Vit K 5 mg x 1 dose given 12/3  - GI ([facility]) following  - Dr David dorsey: -  If further bleeding ---> plan to transfer to [facility] for IR / surgical intervention  - c/w Zofran q6hr, IV Protonix  Drip, Carafate q 6 hrs   - Patient febrile overnight tmax 102.0F, afebrile now - Tylenol prn, AVOID NSAIDS.  - Diet advanced to Regular carbohydrate diet w/ evening snack  - Serial CBC, -Coagulapathy with liver disease  - ICU care - Downgraded from ICU to Tele  - Bleeding Duodenal ulcer on Emergent 2 nd EGD on 12/3/ in ICU s/p clips x2, epi injection, and cautery. Pt had EGD done on Weekend  - pt had multiple bloody BM 2/2 rapid upper GI bleed   - Acute Blood loss Anemia with Bleeding DU - H/H Low stable  - hx of PUD, Esophagitis, Gastritis, non bleeding ulcers on last EGD10/18  - s/p 3 units FFP, 7 units pRBC, 1 platelets & Vit K 5 mg x 1 dose given 12/3  - GI ([facility]) following  - Dr Hernandez recs: -  If further bleeding ---> plan to transfer to [facility] for IR / surgical intervention  - c/w Zofran q6hr, IV Protonix  Drip, Carafate q 6 hrs   - Patient febrile overnight tmax 102.0F, afebrile now - Tylenol prn, AVOID NSAIDS.  - Diet advanced to Regular carbohydrate diet w/ evening snack  - Serial CBC, -Coagulapathy with liver disease - Patient febrile overnight tmax 102.0F, afebrile now -  ?? etiology Tylenol prn, AVOID NSAIDS.  Blood c/s x 2, UA, C/s, CXR,   ID Dr Rios called , d/w   CBC in AM

## 2018-12-06 NOTE — CONSULT NOTE ADULT - SUBJECTIVE AND OBJECTIVE BOX
HPI:  Pt is a 63 YO M with PMHx obesity, DM 2, HTN, HLD, nephrolithiasis, neuropathy, poss JEAN  admitted with GIB sp egd- large du with clot, sp epi w hemostasissp repeat egd, 4 clips placed,   injected w epi and cauterized. Febrile overnight to 102. Denies n/v/d. Denies CP SOB. Denies  pain. No urinary symptoms.     Infectious Disease consult was called to evaluate pt due to fever.    Past Medical & Surgical Hx:  PAST MEDICAL & SURGICAL HISTORY:  GERD with esophagitis: Gastritis &amp; Non Bleeding Ulcers  Hepatic encephalopathy  Obesity  Fatty liver disease, nonalcoholic  Renal stones: 25 years ago  Hypertension  Neuropathy  Hypercholesteremia  Diabetes  S/P cholecystectomy      Social History--  Tobacco: denies   Drug Use: denies  Alcohol Usage: Former social drinker, currently denies Etoh use  Lives with family    FAMILY HISTORY:  Family history of type 2 diabetes mellitus (Mother)  Family history of hypertension (Father)  Family history of stomach cancer (Father)      Allergies  codeine (Anaphylaxis)    Home Medications:  Bacid (LAC) oral tablet: 1 tab(s) orally 2 times a day for  2weeks (30 Nov 2018 19:41)  HumaLOG 100 units/mL subcutaneous solution: 20 unit(s) subcutaneous 2 times a day(breakfast and lunch) (30 Nov 2018 19:41)  HumaLOG 100 units/mL subcutaneous solution: 30 unit(s) subcutaneous once a day(@dinner) (30 Nov 2018 19:41)  irbesartan 300 mg oral tablet: 1 tab(s) orally once a day (30 Nov 2018 19:41)  lactulose 10 g/15 mL oral syrup: 15 milliliter(s) orally 3 times a day (30 Nov 2018 19:41)  Lasix 20 mg oral tablet: 1 tab(s) orally once a day (30 Nov 2018 19:41)  pantoprazole 40 mg oral delayed release tablet: 1 tab(s) orally once a day (30 Nov 2018 19:41)  phytonadione 100 mcg oral tablet: 1 tab(s) orally once a day (30 Nov 2018 19:41)  potassium chloride 10 mEq oral tablet, extended release: 1 tab(s) orally once a day (30 Nov 2018 19:41)  pravastatin 40 mg oral tablet: 1 tab(s) orally once a day (30 Nov 2018 19:41)  Vitamin D3 2000 intl units oral tablet: 1 tab(s) orally once a day (30 Nov 2018 19:41)    Current Inpatient Medications :    ANTIBIOTICS:   rifaximin 550 milliGRAM(s) Oral two times a day      OTHER RELEVANT MEDICATIONS :  acetaminophen   Tablet .. 650 milliGRAM(s) Oral every 6 hours PRN  atorvastatin 10 milliGRAM(s) Oral at bedtime  dextrose 40% Gel 15 Gram(s) Oral once PRN  dextrose 5%. 1000 milliLiter(s) IV Continuous <Continuous>  dextrose 50% Injectable 12.5 Gram(s) IV Push once  dextrose 50% Injectable 25 Gram(s) IV Push once  dextrose 50% Injectable 25 Gram(s) IV Push once  gabapentin 200 milliGRAM(s) Oral four times a day  glucagon  Injectable 1 milliGRAM(s) IntraMuscular once PRN  insulin glargine Injectable (LANTUS) 50 Unit(s) SubCutaneous at bedtime  insulin lispro (HumaLOG) corrective regimen sliding scale   SubCutaneous three times a day before meals  insulin lispro (HumaLOG) corrective regimen sliding scale   SubCutaneous at bedtime  lactulose Syrup 15 Gram(s) Oral two times a day  lidocaine 2% Gel 1 Application(s) Topical every 3 hours PRN  pantoprazole    Tablet 40 milliGRAM(s) Oral two times a day  sucralfate 1 Gram(s) Oral every 6 hours  ursodiol Capsule 300 milliGRAM(s) Oral three times a day    ROS:  CONSTITUTIONAL:  +fever no chills, feels well, good appetite  EYES:  Negative  blurry vision or double vision  CARDIOVASCULAR:  Negative for chest pain or palpitations  RESPIRATORY:  Negative for cough, wheezing, or SOB   GASTROINTESTINAL:  Negative for nausea, vomiting, diarrhea, constipation, or abdominal pain  GENITOURINARY:  Negative frequency, urgency , dysuria or hematuria   NEUROLOGIC:  No headache, confusion, dizziness, lightheadedness  All other systems were reviewed and are negative      I&O's Detail    05 Dec 2018 07:01  -  06 Dec 2018 07:00  --------------------------------------------------------  IN:    Oral Fluid: 1380 mL    pantoprazole Infusion: 10 mL    Solution: 250 mL    Solution: 50 mL  Total IN: 1690 mL    OUT:    Voided: 950 mL  Total OUT: 950 mL    Total NET: 740 mL      06 Dec 2018 07:01  -  06 Dec 2018 15:56  --------------------------------------------------------  IN:  Total IN: 0 mL    OUT:    Voided: 900 mL  Total OUT: 900 mL    Total NET: -900 mL    Physical Exam:  Vital Signs Last 24 Hrs  T(C): 36.9 (06 Dec 2018 11:37), Max: 38.9 (05 Dec 2018 23:00)  T(F): 98.5 (06 Dec 2018 11:37), Max: 102 (05 Dec 2018 23:00)  HR: 94 (06 Dec 2018 11:37) (76 - 109)  BP: 107/64 (06 Dec 2018 11:37) (92/46 - 129/59)  BP(mean): 70 (06 Dec 2018 09:00) (64 - 85)  RR: 17 (06 Dec 2018 11:37) (10 - 32)  SpO2: 94% (06 Dec 2018 11:37) (91% - 99%)      General: in no acute distress  Eyes: sclera anicteric, pupils equal and reactive to light  ENMT: buccal mucosa moist, pharynx not injected  Neck: supple, trachea midline  Lungs: clear, no wheeze/rhonchi  Cardiovascular: regular rate and rhythm, S1 S2  Abdomen: soft, nontender, no organomegaly present, bowel sounds normal  Neurological:  alert and oriented x3, Cranial Nerves II-XII grossly intact  Skin:no increased ecchymosis/petechiae/purpura  Lymph Nodes: no palpable cervical/supraclavicular lymph nodes enlargements  Extremities: no cyanosis/clubbing/edema  IV heplock c/d/i no induration    Labs:                         8.6    x     )-----------( x        ( 06 Dec 2018 13:51 )             25.9   12-06    139  |  104  |  29<H>  ----------------------------<  119<H>  3.6   |  28  |  1.30    Ca    7.0<L>      06 Dec 2018 05:34  Phos  2.8     12-06  Mg     1.7     12-06    TPro  4.3<L>  /  Alb  1.8<L>  /  TBili  2.7<H>  /  DBili  x   /  AST  29  /  ALT  21  /  AlkPhos  73  12-06      RECENT CULTURES:  Culture - Blood (11.30.18 @ 20:55)    Specimen Source: .Blood Blood    Culture Results:   No growth at 5 days.    RADIOLOGY & ADDITIONAL STUDIES:    EXAM:  CT ANGIO ABD PELV (W)AW IC                            PROCEDURE DATE:  12/01/2018          INTERPRETATION:   Cirrhosis, coagulopathic. Evaluate for GI bleed.    CTA abdomen and pelvis prior CT abdomen 10/17/2018  190 cc Omnipaque 350 injected intravenously  Axial images augmented by coronal sagittal reformats 3-D MIP images.    Limited. Suboptimal arterial phase imaging secondary to poor bolus.  No definite evidence of active arterial extravasation. No pooling noted   on the venous phase.  Multiple upper abdominal and retroperitoneal varices similar to prior.  Status post cholecystectomy. Stable prominence of the common duct   attributed postcholecystectomy change. Liver shrunken suggestive of   cirrhosis. Pancreas spleen unremarkable.  No renal abnormalities.  No ascites.  Colonic diverticula without diverticulitis or other acute bowel   inflammation. No bowel obstruction.  Normal size prostate with calcination. Bladder not remarkable.  No acute skeletal abnormality.    Impression:    Limited. Suboptimal arterial phase.  No obvious active gastrointestinal hemorrhage. If warranted scintigraphy   might be considered for additional evaluation.  Additional findings as discussed      EXAM:  XR CHEST PORTABLE URGENT 1V                            PROCEDURE DATE:  12/06/2018          INTERPRETATION:      INDICATION: Fever syncopal episode collapse    Single frontal view of the chest compared to prior study of 12/1/2018.    FINDINGS/  IMPRESSION:       No acute consolidation. There is no pleural effusion. There is no   pneumothorax.  The cardiac silhouette is within normal limits.  There is   degenerative changes.      Assessment :   Pt is a 63 YO M with PMHx obesity, DM 2, HTN, HLD, nephrolithiasis, neuropathy, poss JEAN  admitted with GIB sp egd now with new fevers  Probably reactive  CXR clear  WBC wnl    Plan :   Defer antibiotics  Get UA c&s  Trend temps       Continue with present regime .  Approptiate use of antibiotics and adverse effects reviewed.      I have discussed the above plan of care with patient/family in detail. They expressed understanding of the treatment plan . Risks, benefits and alternatives discussed in detail. I have asked if they have any questions or concerns and appropriately addressed them to the best of my ability .      > 45 minutes spent in direct patient care reviewing  the notes, lab data/ imaging , discussion with multidisciplinary team. All questions were addressed and answered to the best of my capacity .    Thank you for allowing me to participate in the care of your patient .      Tanya Rios MD  Infectious Disease  [phone]

## 2018-12-06 NOTE — PHYSICAL THERAPY INITIAL EVALUATION ADULT - GAIT TRAINING, PT EVAL
ambulation to be assessed within 3-5 visits Assess ambulation /c appropriate assistive device within 3 visits

## 2018-12-06 NOTE — PROGRESS NOTE ADULT - PROBLEM SELECTOR PLAN 3
-elevated Bili -?? Etiology Coagulopathy 2/2 Liver disease   Abnormal LFT's , Bili & PT/PTT/INR - Dr Rios -Hem Follow up  - pt follows GI (Dr. Morales)   -cont ppi, sandra, rifaximin bid  - f/u CMP in AM Anemia & Thrombocytopenia  Ac Blood loss Anemia, 2/2 Bleeding DU  S/P EGD x 2 by GI  -Pt had Multiple pRBC transfusions , FFP & Platelets transfusions.  - As per Dr. David dorsey - consider transfusing to Hgb of 8  Dr Duran's D/W H/H , No Transfusion as Hb > 8
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
## 2018-12-06 NOTE — PROGRESS NOTE ADULT - PROBLEM SELECTOR PLAN 8
- hypotensive improved  after IV Fluid boluses 2/2 GI Bleed   - hold BP meds for now  - on irbesartan 300mg QD at home, resume therapeutic ARB equivalent if BP becomes elevated - hypotensive improved    - hold BP meds for now, Low BP last night  - on irbesartan 300mg QD at home,

## 2018-12-06 NOTE — PHYSICAL THERAPY INITIAL EVALUATION ADULT - LEVEL OF INDEPENDENCE: SIT/SUPINE, REHAB EVAL
moderate assist (50% patients effort)/maximum assist (25% patients effort) maximum assist (25% patients effort)

## 2018-12-06 NOTE — PHYSICAL THERAPY INITIAL EVALUATION ADULT - LEVEL OF INDEPENDENCE: SIT/STAND, REHAB EVAL
unable to perform/deferred at this time due to lightheaded when seated at EOB and req t/f back to supine unable to perform/NA due pt c/o and risk of falls

## 2018-12-06 NOTE — PROVIDER CONTACT NOTE (CRITICAL VALUE NOTIFICATION) - TEST AND RESULT REPORTED:
12/5/18 blood cultures: growth in anaerobic bottle gram variable rods   12/5/18 growth in the anaerobic  bottle gram negative rods 12/5/18 blood cultures: growth in anaerobic bottle gram variable rods   12/5/18  blood culture growth in the anaerobic  bottle gram negative rods

## 2018-12-06 NOTE — PROGRESS NOTE ADULT - SUBJECTIVE AND OBJECTIVE BOX
Patient is a 62y old  Male who presents with a chief complaint of vomiting and near syncope, upper GI bleeding (02 Dec 2018 13:00)      INTERVAL HPI:  Pt is a 61 y/o M with PMHx of Type 2 DM, HTN, HLD, obesity, nephrolithiasis (last stone 25 years ago), neuropathy, HE, NAFLD, Hyper ammonemia, recent Gastritis/ esophagitis on EGD & Gram variable teddy bacteremia in 10/2018  who presents with vomiting and  near syncope/falling out of a moving car. Pt [place] and felt "[place]", his vision was cloudy, he pulled over the car and stopped and vomited. Vomitus was "creamy white." Pt denies seeing blood in vomitus. His dtr then took over driving and Pt again felt like he needed to vomit and told dtr to pull over. He thought the car had stopped and he opened the door and fell out of the moving car. He laid on the ground for ~ 15 minutes. Pt says he possibly lost consciousness. States when he was lying on the ground felt very cold. Pt has had "terrible heartburn" for 2 days and felt chills for 2 days. Pt denies abdominal pain, CP, palp, SOB, cough, myalgias, rash, HA. Pt admits to chills, vomiting (4 episodes), diarrhea (from lactulose), dizziness, pedal edema, tingling in hands (chronic, intermittent). Pt denies recent travel, sick contacts, different eating patterns.    Pt states that early this week pt saw her Endocrinologist & started back on Metformin 500 mg daily as per Dr's recommendation.  In ED Pt's vitals were: T(C): 36.3, HR: 91, BP: 85/59, RR: 15, SpO2: 98% on RA  Labs significant for lactate 5.6, , Hgb 10.4, total bili 3.7, alk phos 183, ammonia 72, Mg 1.4. UA - trace LE, mod blood, RBC 6-10, urine glucose 1,000.   CXR - negative chest  Abd Xray - There is mild content in the colon and no overt sense of bowel obstruction.  CT head - no intracranial hemorrhage or mass effect  CT cervical - Multilevel degenerative disc disease/cervical spondylosis, with posterior osteophyte disc complex C6-7 resulting in flattening of the cervical cord. If there is a clinical concern for spinal cord injury, recommend further evaluation with MRI if there are no clinical contraindications.  EKG - NSR, 93bpm    Pt given 2L NS bolus, Zofran pantoprazole, famotidine, Carafate , Pt seen by GI Dr Morales in ER & was admitted for further Eval.    18: Pt seen, examined, Had total 4 bloody BM so far , Lactate improved, pt had CT A/P with IV Contrast. Drop in H/H. plan for, start Protonix drip,  2 u pRBC 1 u FFP & 5 mg Vit K x 1 as d/w Dr Franks -GI, Elevated ammonia level. Coagulopathy, Improving Lactate. Ac Blood loss Anemia.    18: Pt seen, Examined, pt was transferred to ICU on 18 for symptomatic upper GI Bleed, S/P 7  units pRBC in Total , 3 FFP, 1 platelets transfusion given, Pt had emergent EGD done that found Ac Bleeding Duodenal ulcer found. S/P Epi given. Homeostasis obtained, Pt seen By Sx Dr Hernandez. H/H Stable,  today with MISA, Coagulopathy , Leukocytosis & High Lactate level.  H/H Low stable with Ac Blood  Loss Anemia. Hematology Dr Rios called, as pt is followed by Dr Rios- H/O as out pt.    12/3/18: Patient seen and examined this morning at bedside. Patient denies any acute complaints this morning. Hgb drop from 8.2 --> 7.7. Leukocytosis present at 15.08. Lactate level drop from 3.2 --> 2.5    19: Patient seen and examined this morning. Patient was sitting out of bed in chair, awake, alert, and oriented x3. Hb dropped to 6.6.  Patient states he is feeling better since yesterday. Patient had EGD done yesterday that showed large bleeding Duodenal ulcer s/p clips x2, epi injection, and cautery. Patient was given 3 units of packed red blood cells yesterday. Hbg increased from 7.3 --> 7.8. WBC count drop from 12.53 --> 9.47. Patient tolerating ice chips and clear liquid diet. Patient denies fever, chills, headache, weakness chest pain, palpitations, sob, N/V, diarrhea, constipation, blood in stool, urinary symptoms. Patient admits to feeling fatigued. Has not had a BM today.     18: patient seen, examined at bedside, patient lying comfortably in bed. Hgb at 7.8. WBC 9.35, patient tolerating clear liquid diet. Patient denies fevers, chills, CP, palpitations, N/V/D. As per nurse, BM are still dark. Patient is asymptomatic at this time. On Regular diet.     18:     OVERNIGHT EVENTS: NONE    Home Medications:  Bacid (LAC) oral tablet: 1 tab(s) orally 2 times a day for  2weeks (2018 19:41)  HumaLOG 100 units/mL subcutaneous solution: 20 unit(s) subcutaneous 2 times a day(breakfast and lunch) (2018 19:41)  HumaLOG 100 units/mL subcutaneous solution: 30 unit(s) subcutaneous once a day(@dinner) (2018 19:41)  irbesartan 300 mg oral tablet: 1 tab(s) orally once a day (2018 19:41)  lactulose 10 g/15 mL oral syrup: 15 milliliter(s) orally 3 times a day (2018 19:41)  Lasix 20 mg oral tablet: 1 tab(s) orally once a day (2018 19:41)  pantoprazole 40 mg oral delayed release tablet: 1 tab(s) orally once a day (2018 19:41)  phytonadione 100 mcg oral tablet: 1 tab(s) orally once a day (2018 19:41)  potassium chloride 10 mEq oral tablet, extended release: 1 tab(s) orally once a day (2018 19:41)  pravastatin 40 mg oral tablet: 1 tab(s) orally once a day (2018 19:41)  Vitamin D3 2000 intl units oral tablet: 1 tab(s) orally once a day (2018 19:41)      MEDICATIONS  (STANDING):  dextrose 5%. 1000 milliLiter(s) (50 mL/Hr) IV Continuous <Continuous>  dextrose 50% Injectable 12.5 Gram(s) IV Push once  dextrose 50% Injectable 25 Gram(s) IV Push once  dextrose 50% Injectable 25 Gram(s) IV Push once  gabapentin 200 milliGRAM(s) Oral four times a day  insulin glargine Injectable (LANTUS) 22 Unit(s) SubCutaneous at bedtime  insulin lispro (HumaLOG) corrective regimen sliding scale   SubCutaneous every 6 hours  pantoprazole Infusion 8 mG/Hr (10 mL/Hr) IV Continuous <Continuous>  rifaximin 550 milliGRAM(s) Oral two times a day  sucralfate 1 Gram(s) Oral every 6 hours  ursodiol Capsule 300 milliGRAM(s) Oral three times a day    MEDICATIONS  (PRN):  dextrose 40% Gel 15 Gram(s) Oral once PRN Blood Glucose LESS THAN 70 milliGRAM(s)/deciliter  glucagon  Injectable 1 milliGRAM(s) IntraMuscular once PRN Glucose LESS THAN 70 milligrams/deciliter      Allergies    codeine (Anaphylaxis)    Intolerances    REVIEW OF SYSTEMS: i feel tired, Hungry  CONSTITUTIONAL: No fever, No chills No myalgia, No Body ache, No Weakness. Admits to fatigue.  EYES: No eye pain,  No visual disturbances, No discharge, NO Redness  ENMT:  No ear pain, No nose bleed, No vertigo; No sinus or throat pain, No Congestion  NECK: No pain, No stiffness  RESPIRATORY: No cough, wheezing, No hemoptysis, No shortness of breath  CARDIOVASCULAR: No chest pain, palpitations  GASTROINTESTINAL: No abdominal or epigastric pain. No nausea, No vomiting; No diarrhea or constipation. [ x ] BM  GENITOURINARY: No dysuria, No frequency, No urgency, No hematuria, or incontinence  NEUROLOGICAL: No headaches, No dizziness, No numbness, No tingling, No tremors, No weakness  EXT: No Swelling, No Pain, No Edema  SKIN:  [ x ] No itching, burning, new rashes, or lesions   MUSCULOSKELETAL: No joint pain. No muscle pain, No back pain, No extremity pain  PSYCHIATRIC: No depression, anxiety, mood swings or difficulty sleeping at night  PAIN SCALE: [x  ] None  [  ] Other-  ROS Unable to obtain due to - [  ] Dementia  [  ] Lethargy  [  ] Sedated [  ] non verbal  REST OF REVIEW Of SYSTEM - [ x ] Normal     Vital Signs Last 24 Hrs  T(C): 37.3 (05 Dec 2018 08:37), Max: 37.3 (05 Dec 2018 08:37)  T(F): 99.1 (05 Dec 2018 08:37), Max: 99.1 (05 Dec 2018 08:37)  HR: 93 (05 Dec 2018 11:00) (93 - 104)  BP: 116/56 (05 Dec 2018 11:00) (102/51 - 138/63)  BP(mean): 80 (05 Dec 2018 11:00) (73 - 91)  RR: 14 (05 Dec 2018 11:00) (12 - 31)  SpO2: 97% (05 Dec 2018 11:00) (92% - 97%)     @ 07:  -   @ 07:00  --------------------------------------------------------  IN: 1914 mL / OUT: 1030 mL / NET: 884 mL     @ 07:01  -   @ 13:15  --------------------------------------------------------  IN: 50 mL / OUT: 220 mL / NET: -170 mL    PHYSICAL EXAM: Rt IJ TLC , Beasley cath  GENERAL:  [x  ] NAD , [ x ] well appearing, [  ] Agitated, [  ] Drowsy,  [  ] Lethargy, [  ] confused   HEAD:  [ x ] Normal, [  ] Other  EYES:  [ x ] EOMI, [x  ] PERRLA, [ x ] conjunctiva and sclera clear normal, [  ] Other,  [x  ] Pallor,[  ] Discharge  ENMT:  [x  ] Normal, [ x ] Moist mucous membranes, [ x ] Good dentition, [ x ] No Thrush  NECK:  [x  ] Supple, [ x ] No JVD, [x  ] Normal thyroid, [  ] Lymphadenopathy [  ] Other  CHEST/LUNG:  [x  ] Clear to auscultation bilaterally, [ x ] Breath Sounds equal B/L,  [x  ] No rales, [x  ] No rhonchi  [ x ]  No wheezing  HEART:  [x  ] irregular rate and regular rhythm, [ x ] tachycardia, [  ] Bradycardia,  [  ] irregular  [ x] No murmurs, No rubs, No gallops, [  ] PPM in place (Mfr:  )  ABDOMEN:  [x  ] Soft, [ x ] Nontender, [x  ] Nondistended, [x  ] No mass, [x  ] Bowel sounds present, [ x ] obese  NERVOUS SYSTEM:  [x  ] Alert & Oriented X3, [ x ] Nonfocal  [  ] Confusion  [  ] Encephalopathic [  ] Sedated [  ] Unable to assess, [  ] Other-  EXTREMITIES: [ x ] 2+ Peripheral Pulses, No clubbing, No cyanosis,  [  ] edema B/L lower EXT. [  ] PVD stasis skin changes B/L Lower EXT  LYMPH: No lymphadenopathy noted  SKIN:  [ x ] No new rashes or lesions, [  ] Pressure Ulcers, [  ] ecchymosis, [  ] Skin Tears, [  ] Other    DIET: clear liquid diet     LABS:                        7.8    9.35  )-----------( 106      ( 05 Dec 2018 05:59 )             22.5     12-    140  |  104  |  29<H>  ----------------------------<  65<L>  3.4<L>   |  29  |  0.98    Ca    7.4<L>      05 Dec 2018 05:59  Phos  2.1     12-  Mg     1.9     12-    TPro  4.1<L>  /  Alb  1.8<L>  /  TBili  2.9<H>  /  DBili  x   /  AST  36  /  ALT  19  /  AlkPhos  72  12-05    LIVER FUNCTIONS - ( 05 Dec 2018 05:59 )  Alb: 1.8 g/dL / Pro: 4.1 g/dL / ALK PHOS: 72 U/L / ALT: 19 U/L / AST: 36 U/L / GGT: x           PT/INR - ( 04 Dec 2018 06:12 )   PT: 18.6 sec;   INR: 1.61 ratio         PTT - ( 04 Dec 2018 06:12 )  PTT:27.7 sec    Lactate, Blood (12.03.18 @ 06:24)    Lactate, Blood: 2.5 mmol/L      Lactate, Blood (12..18 @ 12:52)    Lactate, Blood: 4.2 mmol/L    Hemoglobin A1C, Whole Blood (12..18 @ 10:32)  Hemoglobin A1C, Whole Blood: 8.3: Method: Immunoassay    Urinalysis Basic - ( 2018 18:30 )    Color: Yellow / Appearance: Slightly Turbid / S.015 / pH: x  Gluc: x / Ketone: Trace  / Bili: Small / Urobili: Negative   Blood: x / Protein: 25 mg/dL / Nitrite: Negative   Leuk Esterase: Trace / RBC: 6-10 /HPF / WBC 3-5   Sq Epi: x / Non Sq Epi: Occasional / Bacteria: Occasional    Culture Results:   No growth to date. ( @ 20:55)  Culture Results:   No growth to date. ( @ 20:55)  Culture Results:   <10,000 CFU/ml Normal Urogenital ninfa present ( @ 20:19)    culture blood  -- .Blood Blood  @ 20:55    culture urine  --   @ 20:55  culture blood  -- .Urine Clean Catch (Midstream)  @ 20:19    culture urine  --   @ 20:19    CARDIAC MARKERS ( 02 Dec 2018 08:40 )  x     / x     / 504 U/L / x     / x      CARDIAC MARKERS ( 2018 23:51 )  .019 ng/mL / x     / 301 U/L / x     / 2.8 ng/mL  CARDIAC MARKERS ( 2018 16:25 )  .019 ng/mL / x     / x     / x     / x        Culture - Blood (collected 2018 20:55)  Source: .Blood Blood  Preliminary Report (01 Dec 2018 21:01):    No growth to date.    Culture - Blood (collected 2018 20:55)  Source: .Blood Blood  Preliminary Report (01 Dec 2018 21:01):    No growth to date.    Culture - Urine (collected 2018 20:19)  Source: .Urine Clean Catch (Midstream)  Final Report (01 Dec 2018 15:16):    <10,000 CFU/ml Normal Urogenital ninfa present    Lipase, Serum: 70 U/L (18 @ 16:25)    RADIOLOGY & ADDITIONAL TESTS:  < from: Xray Chest 1 View- PORTABLE-Urgent (18 @ 23:39) >    EXAM:  XR CHEST PORTABLE URGENT 1V                            PROCEDURE DATE:  2018        INTERPRETATION:  Portable chest radiograph       CLINICAL INFORMATION: Line placement. GI bleeding.    TECHNIQUE:  Single portable frontal AP view ofthe chest was obtained.    FINDINGS: The examination of 10/17/2018 is available for review.    The lungs are free of infiltrate.     No pleural abnormality is seen.      The heart and mediastinum appear unchanged. The tip of the right IJ line   is projecting over the superior vena cava    No destructive lesion is seen in the visualized skeleton.    IMPRESSION:   No infiltrate.      < end of copied text >    HEALTH ISSUES - PROBLEM Dx:  GIB (gastrointestinal bleeding): GIB (gastrointestinal bleeding)  Lactic acid blood increased: Lactic acid blood increased  Prophylactic measure: Prophylactic measure  Pedal edema: Pedal edema  Neuropathy: Neuropathy  Hypercholesteremia: Hypercholesteremia  Hypertension: Hypertension  Fatty liver disease, nonalcoholic: Fatty liver disease, nonalcoholic  Hepatic encephalopathy: Hepatic encephalopathy  Diabetes: Diabetes  Near syncope: Near syncope  Vomiting: Vomiting    Consultant(s) Notes Reviewed:  [ x ] YES     Care Discussed with [X] Consultants  [  x] Patient  [  ] Family  [  ]   [  ] Social Service  [ x ] RN, [  ] Physical Therapy  DVT PPX: [  ] Lovenox, [  ] S C Heparin, [  ] Coumadin, [  ] Xarelto, [  ] Eliquis, [  ] Pradaxa, [  ] IV Heparin drip, [ x ] SCD [  ] Contraindication 2 to GI Bleed,[  ] Ambulation  Advanced directive: [x  ] None, [  ] DNR/DNI Patient is a 62y old  Male who presents with a chief complaint of vomiting and near syncope, upper GI bleeding (02 Dec 2018 13:00)      INTERVAL HPI:  Pt is a 61 y/o M with PMHx of Type 2 DM, HTN, HLD, obesity, nephrolithiasis (last stone 25 years ago), neuropathy, HE, NAFLD, Hyper ammonemia, recent Gastritis/ esophagitis on EGD & Gram variable teddy bacteremia in 10/2018  who presents with vomiting and  near syncope/falling out of a moving car. Pt [place] and [place]", his vision was cloudy, he pulled over the car and stopped and vomited. Vomitus was "creamy white." Pt denies seeing blood in vomitus. His dtr then took over driving and Pt again felt like he needed to vomit and told dtr to pull over. He thought the car had stopped and he opened the door and fell out of the moving car. He laid on the ground for ~ 15 minutes. Pt says he possibly lost consciousness. States when he was lying on the ground felt very cold. Pt has had "terrible heartburn" for 2 days and felt chills for 2 days. Pt denies abdominal pain, CP, palp, SOB, cough, myalgias, rash, HA. Pt admits to chills, vomiting (4 episodes), diarrhea (from lactulose), dizziness, pedal edema, tingling in hands (chronic, intermittent). Pt denies recent travel, sick contacts, different eating patterns.    Pt states that early this week pt saw her Endocrinologist & started back on Metformin 500 mg daily as per Dr's recommendation.  In ED Pt's vitals were: T(C): 36.3, HR: 91, BP: 85/59, RR: 15, SpO2: 98% on RA  Labs significant for lactate 5.6, , Hgb 10.4, total bili 3.7, alk phos 183, ammonia 72, Mg 1.4. UA - trace LE, mod blood, RBC 6-10, urine glucose 1,000.   CXR - negative chest  Abd Xray - There is mild content in the colon and no overt sense of bowel obstruction.  CT head - no intracranial hemorrhage or mass effect  CT cervical - Multilevel degenerative disc disease/cervical spondylosis, with posterior osteophyte disc complex C6-7 resulting in flattening of the cervical cord. If there is a clinical concern for spinal cord injury, recommend further evaluation with MRI if there are no clinical contraindications.  EKG - NSR, 93bpm    Pt given 2L NS bolus, Zofran pantoprazole, famotidine, Carafate , Pt seen by GI Dr Morales in ER & was admitted for further Eval.    18: Pt seen, examined, Had total 4 bloody BM so far , Lactate improved, pt had CT A/P with IV Contrast. Drop in H/H. plan for, start Protonix drip,  2 u pRBC 1 u FFP & 5 mg Vit K x 1 as d/w Dr Franks -GI, Elevated ammonia level. Coagulopathy, Improving Lactate. Ac Blood loss Anemia.    18: Pt seen, Examined, pt was transferred to ICU on 18 for symptomatic upper GI Bleed, S/P 7  units pRBC in Total , 3 FFP, 1 platelets transfusion given, Pt had emergent EGD done that found Ac Bleeding Duodenal ulcer found. S/P Epi given. Homeostasis obtained, Pt seen By Sx Dr Hernandez. H/H Stable,  today with MISA, Coagulopathy , Leukocytosis & High Lactate level.  H/H Low stable with Ac Blood  Loss Anemia. Hematology Dr Rios called, as pt is followed by Dr Rios- H/O as out pt.    12/3/18: Patient seen and examined this morning at bedside. Patient denies any acute complaints this morning. Hgb drop from 8.2 --> 7.7. Leukocytosis present at 15.08. Lactate level drop from 3.2 --> 2.5    19: Patient seen and examined this morning. Patient was sitting out of bed in chair, awake, alert, and oriented x3. Hb dropped to 6.6.  Patient states he is feeling better since yesterday. Patient had EGD done yesterday that showed large bleeding Duodenal ulcer s/p clips x2, epi injection, and cautery. Patient was given 3 units of packed red blood cells yesterday. Hbg increased from 7.3 --> 7.8. WBC count drop from 12.53 --> 9.47. Patient tolerating ice chips and clear liquid diet. Patient denies fever, chills, headache, weakness chest pain, palpitations, sob, N/V, diarrhea, constipation, blood in stool, urinary symptoms. Patient admits to feeling fatigued. Has not had a BM today.     18: patient seen, examined at bedside, patient lying comfortably in bed. Hgb at 7.8. WBC 9.35, patient tolerating clear liquid diet. Patient denies fevers, chills, CP, palpitations, N/V/D. As per nurse, BM are still dark. Patient is asymptomatic at this time. On Regular diet.     18: Patient seen and examine at bedside this morning. Patient lying comfortably in bed. Hgb drop 7.8 --> 7.4. WBC 8.63. Creatinine up .98-->1.3. Tolerating regular carbohydrate diet, ate breakfast this morning. States that this was his second meal and has not had any associated N/V. Patient states he had a BM earlier this morning that was normal in consistency without any blood in stool. Patient is asymptomatic at this time. Patients BP is in 100/50's this AM, patient states that his pressure normal runs between "100-110". Patient denies headache, fatigue, weakness, chest pain, sob, palpitations, N/V, abdominal pain, diarrhea, constipation.     OVERNIGHT EVENTS: NONE    Home Medications:  Bacid (LAC) oral tablet: 1 tab(s) orally 2 times a day for  2weeks (2018 19:41)  HumaLOG 100 units/mL subcutaneous solution: 20 unit(s) subcutaneous 2 times a day(breakfast and lunch) (2018 19:41)  HumaLOG 100 units/mL subcutaneous solution: 30 unit(s) subcutaneous once a day(@dinner) (2018 19:41)  irbesartan 300 mg oral tablet: 1 tab(s) orally once a day (2018 19:41)  lactulose 10 g/15 mL oral syrup: 15 milliliter(s) orally 3 times a day (2018 19:41)  Lasix 20 mg oral tablet: 1 tab(s) orally once a day (2018 19:41)  pantoprazole 40 mg oral delayed release tablet: 1 tab(s) orally once a day (2018 19:41)  phytonadione 100 mcg oral tablet: 1 tab(s) orally once a day (2018 19:41)  potassium chloride 10 mEq oral tablet, extended release: 1 tab(s) orally once a day (2018 19:41)  pravastatin 40 mg oral tablet: 1 tab(s) orally once a day (2018 19:41)  Vitamin D3 2000 intl units oral tablet: 1 tab(s) orally once a day (2018 19:41)      MEDICATIONS  (STANDING):  dextrose 5%. 1000 milliLiter(s) (50 mL/Hr) IV Continuous <Continuous>  dextrose 50% Injectable 12.5 Gram(s) IV Push once  dextrose 50% Injectable 25 Gram(s) IV Push once  dextrose 50% Injectable 25 Gram(s) IV Push once  gabapentin 200 milliGRAM(s) Oral four times a day  insulin glargine Injectable (LANTUS) 22 Unit(s) SubCutaneous at bedtime  insulin lispro (HumaLOG) corrective regimen sliding scale   SubCutaneous every 6 hours  pantoprazole Infusion 8 mG/Hr (10 mL/Hr) IV Continuous <Continuous>  rifaximin 550 milliGRAM(s) Oral two times a day  sucralfate 1 Gram(s) Oral every 6 hours  ursodiol Capsule 300 milliGRAM(s) Oral three times a day    MEDICATIONS  (PRN):  dextrose 40% Gel 15 Gram(s) Oral once PRN Blood Glucose LESS THAN 70 milliGRAM(s)/deciliter  glucagon  Injectable 1 milliGRAM(s) IntraMuscular once PRN Glucose LESS THAN 70 milligrams/deciliter      Allergies    codeine (Anaphylaxis)    Intolerances    REVIEW OF SYSTEMS: "I feel good"  CONSTITUTIONAL: No fever, No chills No myalgia, No Body ache, No Weakness.   EYES: No eye pain,  No visual disturbances, No discharge, NO Redness  ENMT:  No ear pain, No nose bleed, No vertigo; No sinus or throat pain, No Congestion  NECK: No pain, No stiffness  RESPIRATORY: No cough, wheezing, No hemoptysis, No shortness of breath  CARDIOVASCULAR: No chest pain, palpitations  GASTROINTESTINAL: No abdominal or epigastric pain. No nausea, No vomiting; No diarrhea or constipation. [ X ] BM  GENITOURINARY: No dysuria, No frequency, No urgency, No hematuria, or incontinence  NEUROLOGICAL: No headaches, No dizziness, No numbness, No tingling, No tremors, No weakness  EXT: No Swelling, No Pain, No Edema  SKIN:  [ x ] No itching, burning, new rashes, or lesions   MUSCULOSKELETAL: No joint pain. No muscle pain, No back pain, No extremity pain  PSYCHIATRIC: No depression, anxiety, mood swings or difficulty sleeping at night  PAIN SCALE: [x  ] None  [  ] Other-  ROS Unable to obtain due to - [  ] Dementia  [  ] Lethargy  [  ] Sedated [  ] non verbal  REST OF REVIEW Of SYSTEM - [ x ] Normal     Vital Signs Last 24 Hrs  T(C): 37.3 (05 Dec 2018 08:37), Max: 37.3 (05 Dec 2018 08:37)  T(F): 99.1 (05 Dec 2018 08:37), Max: 99.1 (05 Dec 2018 08:37)  HR: 93 (05 Dec 2018 11:00) (93 - 104)  BP: 116/56 (05 Dec 2018 11:00) (102/51 - 138/63)  BP(mean): 80 (05 Dec 2018 11:) (73 - 91)  RR: 14 (05 Dec 2018 11:) (12 - 31)  SpO2: 97% (05 Dec 2018 11:00) (92% - 97%)     @ :  -   @ 07:00  --------------------------------------------------------  IN: 1914 mL / OUT: 1030 mL / NET: 884 mL     @ :  -   @ 13:15  --------------------------------------------------------  IN: 50 mL / OUT: 220 mL / NET: -170 mL    PHYSICAL EXAM: Rt IJ TLC , Beasley cath  GENERAL:  [x  ] NAD , [ x ] well appearing, [  ] Agitated, [  ] Drowsy,  [  ] Lethargy, [  ] confused   HEAD:  [ x ] Normal, [  ] Other  EYES:  [ x ] EOMI, [x  ] PERRLA, [ x ] conjunctiva and sclera clear normal, [  ] Other,  [x  ] Pallor,[  ] Discharge  ENMT:  [x  ] Normal, [ x ] Moist mucous membranes, [ x ] Good dentition, [ x ] No Thrush  NECK:  [x  ] Supple, [ x ] No JVD, [x  ] Normal thyroid, [  ] Lymphadenopathy [  ] Other  CHEST/LUNG:  [x  ] Clear to auscultation bilaterally, [ x ] Breath Sounds equal B/L,  [x  ] No rales, [x  ] No rhonchi  [ x ]  No wheezing  HEART:  [x  ] irregular rate and regular rhythm, [ x ] tachycardia, [  ] Bradycardia,  [  ] irregular  [ x] No murmurs, No rubs, No gallops, [  ] PPM in place (Mfr:  )  ABDOMEN:  [x  ] Soft, [ x ] Nontender, [x  ] Nondistended, [x  ] No mass, [x  ] Bowel sounds present, [ x ] obese  NERVOUS SYSTEM:  [x  ] Alert & Oriented X3, [ x ] Nonfocal  [  ] Confusion  [  ] Encephalopathic [  ] Sedated [  ] Unable to assess, [  ] Other-  EXTREMITIES: [ x ] 2+ Peripheral Pulses, No clubbing, No cyanosis,  [  ] edema B/L lower EXT. [  ] PVD stasis skin changes B/L Lower EXT  LYMPH: No lymphadenopathy noted  SKIN:  [ x ] No new rashes or lesions, [  ] Pressure Ulcers, [  ] ecchymosis, [  ] Skin Tears, [  ] Other    DIET: Regular Carbohydrate Diet w/ Evening Snack    LABS:                        7.8    9.35  )-----------( 106      ( 05 Dec 2018 05:59 )             22.5     12-    140  |  104  |  29<H>  ----------------------------<  65<L>  3.4<L>   |  29  |  0.98    Ca    7.4<L>      05 Dec 2018 05:59  Phos  2.1     12-  Mg     1.9     12-    TPro  4.1<L>  /  Alb  1.8<L>  /  TBili  2.9<H>  /  DBili  x   /  AST  36  /  ALT  19  /  AlkPhos  72  12-    LIVER FUNCTIONS - ( 05 Dec 2018 05:59 )  Alb: 1.8 g/dL / Pro: 4.1 g/dL / ALK PHOS: 72 U/L / ALT: 19 U/L / AST: 36 U/L / GGT: x           PT/INR - ( 04 Dec 2018 06:12 )   PT: 18.6 sec;   INR: 1.61 ratio         PTT - ( 04 Dec 2018 06:12 )  PTT:27.7 sec    Lactate, Blood (12.03.18 @ 06:24)    Lactate, Blood: 2.5 mmol/L      Lactate, Blood (12.02.18 @ 12:52)    Lactate, Blood: 4.2 mmol/L    Hemoglobin A1C, Whole Blood (12.01.18 @ 10:32)  Hemoglobin A1C, Whole Blood: 8.3: Method: Immunoassay    Urinalysis Basic - ( 2018 18:30 )    Color: Yellow / Appearance: Slightly Turbid / S.015 / pH: x  Gluc: x / Ketone: Trace  / Bili: Small / Urobili: Negative   Blood: x / Protein: 25 mg/dL / Nitrite: Negative   Leuk Esterase: Trace / RBC: 6-10 /HPF / WBC 3-5   Sq Epi: x / Non Sq Epi: Occasional / Bacteria: Occasional    Culture Results:   No growth to date. ( @ 20:55)  Culture Results:   No growth to date. ( @ 20:55)  Culture Results:   <10,000 CFU/ml Normal Urogenital ninfa present ( @ 20:19)    culture blood  -- .Blood Blood  @ 20:55    culture urine  --   @ 20:55  culture blood  -- .Urine Clean Catch (Midstream)  @ 20:19    culture urine  --   @ 20:19    CARDIAC MARKERS ( 02 Dec 2018 08:40 )  x     / x     / 504 U/L / x     / x      CARDIAC MARKERS ( 2018 23:51 )  .019 ng/mL / x     / 301 U/L / x     / 2.8 ng/mL  CARDIAC MARKERS ( 2018 16:25 )  .019 ng/mL / x     / x     / x     / x        Culture - Blood (collected 2018 20:55)  Source: .Blood Blood  Preliminary Report (01 Dec 2018 21:01):    No growth to date.    Culture - Blood (collected 2018 20:55)  Source: .Blood Blood  Preliminary Report (01 Dec 2018 21:01):    No growth to date.    Culture - Urine (collected 2018 20:19)  Source: .Urine Clean Catch (Midstream)  Final Report (01 Dec 2018 15:16):    <10,000 CFU/ml Normal Urogenital ninfa present    Lipase, Serum: 70 U/L (18 @ 16:25)    RADIOLOGY & ADDITIONAL TESTS:  < from: Xray Chest 1 View- PORTABLE-Urgent (18 @ 23:39) >    EXAM:  XR CHEST PORTABLE URGENT 1V                            PROCEDURE DATE:  2018        INTERPRETATION:  Portable chest radiograph       CLINICAL INFORMATION: Line placement. GI bleeding.    TECHNIQUE:  Single portable frontal AP view ofthe chest was obtained.    FINDINGS: The examination of 10/17/2018 is available for review.    The lungs are free of infiltrate.     No pleural abnormality is seen.      The heart and mediastinum appear unchanged. The tip of the right IJ line   is projecting over the superior vena cava    No destructive lesion is seen in the visualized skeleton.    IMPRESSION:   No infiltrate.      < end of copied text >    HEALTH ISSUES - PROBLEM Dx:  GIB (gastrointestinal bleeding): GIB (gastrointestinal bleeding)  Lactic acid blood increased: Lactic acid blood increased  Prophylactic measure: Prophylactic measure  Pedal edema: Pedal edema  Neuropathy: Neuropathy  Hypercholesteremia: Hypercholesteremia  Hypertension: Hypertension  Fatty liver disease, nonalcoholic: Fatty liver disease, nonalcoholic  Hepatic encephalopathy: Hepatic encephalopathy  Diabetes: Diabetes  Near syncope: Near syncope  Vomiting: Vomiting    Consultant(s) Notes Reviewed:  [ x ] YES     Care Discussed with [X] Consultants  [  x] Patient  [  ] Family  [  ]   [  ] Social Service  [ x ] RN, [  ] Physical Therapy  DVT PPX: [  ] Lovenox, [  ] S C Heparin, [  ] Coumadin, [  ] Xarelto, [  ] Eliquis, [  ] Pradaxa, [  ] IV Heparin drip, [ x ] SCD [  ] Contraindication 2 to GI Bleed,[  ] Ambulation  Advanced directive: [x  ] None, [  ] DNR/DNI Patient is a 62y old  Male who presents with a chief complaint of vomiting and near syncope, upper GI bleeding (02 Dec 2018 13:00)      INTERVAL HPI:  Pt is a 63 y/o M with PMHx of Type 2 DM, HTN, HLD, obesity, nephrolithiasis (last stone 25 years ago), neuropathy, HE, NAFLD, Hyper ammonemia, recent Gastritis/ esophagitis on EGD & Gram variable teddy bacteremia in 10/2018  who presents with vomiting and  near syncope/falling out of a moving car. Pt [place] and [place]", his vision was cloudy, he pulled over the car and stopped and vomited. Vomitus was "creamy white." Pt denies seeing blood in vomitus. His dtr then took over driving and Pt again felt like he needed to vomit and told dtr to pull over. He thought the car had stopped and he opened the door and fell out of the moving car. He laid on the ground for ~ 15 minutes. Pt says he possibly lost consciousness. States when he was lying on the ground felt very cold. Pt has had "terrible heartburn" for 2 days and felt chills for 2 days. Pt denies abdominal pain, CP, palp, SOB, cough, myalgias, rash, HA. Pt admits to chills, vomiting (4 episodes), diarrhea (from lactulose), dizziness, pedal edema, tingling in hands (chronic, intermittent). Pt denies recent travel, sick contacts, different eating patterns.    Pt states that early this week pt saw her Endocrinologist & started back on Metformin 500 mg daily as per Dr's recommendation.  In ED Pt's vitals were: T(C): 36.3, HR: 91, BP: 85/59, RR: 15, SpO2: 98% on RA  Labs significant for lactate 5.6, , Hgb 10.4, total bili 3.7, alk phos 183, ammonia 72, Mg 1.4. UA - trace LE, mod blood, RBC 6-10, urine glucose 1,000.   CXR - negative chest  Abd Xray - There is mild content in the colon and no overt sense of bowel obstruction.  CT head - no intracranial hemorrhage or mass effect  CT cervical - Multilevel degenerative disc disease/cervical spondylosis, with posterior osteophyte disc complex C6-7 resulting in flattening of the cervical cord. If there is a clinical concern for spinal cord injury, recommend further evaluation with MRI if there are no clinical contraindications.  EKG - NSR, 93bpm    Pt given 2L NS bolus, Zofran pantoprazole, famotidine, Carafate , Pt seen by GI Dr Morales in ER & was admitted for further Eval.    18: Pt seen, examined, Had total 4 bloody BM so far , Lactate improved, pt had CT A/P with IV Contrast. Drop in H/H. plan for, start Protonix drip,  2 u pRBC 1 u FFP & 5 mg Vit K x 1 as d/w Dr Franks -GI, Elevated ammonia level. Coagulopathy, Improving Lactate. Ac Blood loss Anemia.    18: Pt seen, Examined, pt was transferred to ICU on 18 for symptomatic upper GI Bleed, S/P 7  units pRBC in Total , 3 FFP, 1 platelets transfusion given, Pt had emergent EGD done that found Ac Bleeding Duodenal ulcer found. S/P Epi given. Homeostasis obtained, Pt seen By Sx Dr Hernandez. H/H Stable,  today with MISA, Coagulopathy , Leukocytosis & High Lactate level.  H/H Low stable with Ac Blood  Loss Anemia. Hematology Dr Rios called, as pt is followed by Dr Rios- H/O as out pt.    12/3/18: Patient seen and examined this morning at bedside. Patient denies any acute complaints this morning. Hgb drop from 8.2 --> 7.7. Leukocytosis present at 15.08. Lactate level drop from 3.2 --> 2.5    19: Patient seen and examined this morning. Patient was sitting out of bed in chair, awake, alert, and oriented x3. Hb dropped to 6.6.  Patient states he is feeling better since yesterday. Patient had EGD done yesterday that showed large bleeding Duodenal ulcer s/p clips x2, epi injection, and cautery. Patient was given 3 units of packed red blood cells yesterday. Hbg increased from 7.3 --> 7.8. WBC count drop from 12.53 --> 9.47. Patient tolerating ice chips and clear liquid diet. Patient denies fever, chills, headache, weakness chest pain, palpitations, sob, N/V, diarrhea, constipation, blood in stool, urinary symptoms. Patient admits to feeling fatigued. Has not had a BM today.     18: patient seen, examined at bedside, patient lying comfortably in bed. Hgb at 7.8. WBC 9.35, patient tolerating clear liquid diet. Patient denies fevers, chills, CP, palpitations, N/V/D. As per nurse, BM are still dark. Patient is asymptomatic at this time. On Regular diet.     18: Patient seen and examine at bedside this morning. Patient lying comfortably in bed. Hgb drop 7.8 --> 7.4. WBC 8.63. Creatinine up .98-->1.3. Tolerating regular carbohydrate diet, ate breakfast this morning. States that this was his second meal and has not had any associated N/V. Patient states he had a BM earlier this morning that was normal in consistency without any blood in stool. Patient is asymptomatic at this time. Patients BP is in 100/50's this AM, patient states that his pressure normal runs between "100-110". Patient denies headache, fatigue, weakness, chest pain, sob, palpitations, N/V, abdominal pain, diarrhea, constipation.     OVERNIGHT EVENTS: NONE    Home Medications:  Bacid (LAC) oral tablet: 1 tab(s) orally 2 times a day for  2weeks (2018 19:41)  HumaLOG 100 units/mL subcutaneous solution: 20 unit(s) subcutaneous 2 times a day(breakfast and lunch) (2018 19:41)  HumaLOG 100 units/mL subcutaneous solution: 30 unit(s) subcutaneous once a day(@dinner) (2018 19:41)  irbesartan 300 mg oral tablet: 1 tab(s) orally once a day (2018 19:41)  lactulose 10 g/15 mL oral syrup: 15 milliliter(s) orally 3 times a day (2018 19:41)  Lasix 20 mg oral tablet: 1 tab(s) orally once a day (2018 19:41)  pantoprazole 40 mg oral delayed release tablet: 1 tab(s) orally once a day (2018 19:41)  phytonadione 100 mcg oral tablet: 1 tab(s) orally once a day (2018 19:41)  potassium chloride 10 mEq oral tablet, extended release: 1 tab(s) orally once a day (2018 19:41)  pravastatin 40 mg oral tablet: 1 tab(s) orally once a day (2018 19:41)  Vitamin D3 2000 intl units oral tablet: 1 tab(s) orally once a day (2018 19:41)      MEDICATIONS  (STANDING):  dextrose 5%. 1000 milliLiter(s) (50 mL/Hr) IV Continuous <Continuous>  dextrose 50% Injectable 12.5 Gram(s) IV Push once  dextrose 50% Injectable 25 Gram(s) IV Push once  dextrose 50% Injectable 25 Gram(s) IV Push once  gabapentin 200 milliGRAM(s) Oral four times a day  insulin glargine Injectable (LANTUS) 22 Unit(s) SubCutaneous at bedtime  insulin lispro (HumaLOG) corrective regimen sliding scale   SubCutaneous every 6 hours  pantoprazole Infusion 8 mG/Hr (10 mL/Hr) IV Continuous <Continuous>  rifaximin 550 milliGRAM(s) Oral two times a day  sucralfate 1 Gram(s) Oral every 6 hours  ursodiol Capsule 300 milliGRAM(s) Oral three times a day    MEDICATIONS  (PRN):  dextrose 40% Gel 15 Gram(s) Oral once PRN Blood Glucose LESS THAN 70 milliGRAM(s)/deciliter  glucagon  Injectable 1 milliGRAM(s) IntraMuscular once PRN Glucose LESS THAN 70 milligrams/deciliter      Allergies    codeine (Anaphylaxis)    Intolerances    REVIEW OF SYSTEMS: "I feel good"  CONSTITUTIONAL: No fever, No chills No myalgia, No Body ache, No Weakness.   EYES: No eye pain,  No visual disturbances, No discharge, NO Redness  ENMT:  No ear pain, No nose bleed, No vertigo; No sinus or throat pain, No Congestion  NECK: No pain, No stiffness  RESPIRATORY: No cough, wheezing, No hemoptysis, No shortness of breath  CARDIOVASCULAR: No chest pain, palpitations  GASTROINTESTINAL: No abdominal or epigastric pain. No nausea, No vomiting; No diarrhea or constipation. [ X ] BM  GENITOURINARY: No dysuria, No frequency, No urgency, No hematuria, or incontinence  NEUROLOGICAL: No headaches, No dizziness, No numbness, No tingling, No tremors, No weakness  EXT: No Swelling, No Pain, No Edema  SKIN:  [ x ] No itching, burning, new rashes, or lesions   MUSCULOSKELETAL: No joint pain. No muscle pain, No back pain, No extremity pain  PSYCHIATRIC: No depression, anxiety, mood swings or difficulty sleeping at night  PAIN SCALE: [x  ] None  [  ] Other-  ROS Unable to obtain due to - [  ] Dementia  [  ] Lethargy  [  ] Sedated [  ] non verbal  REST OF REVIEW Of SYSTEM - [ x ] Normal     Vital Signs Last 24 Hrs  T(C): 37.3 (05 Dec 2018 08:37), Max: 37.3 (05 Dec 2018 08:37)  T(F): 99.1 (05 Dec 2018 08:37), Max: 99.1 (05 Dec 2018 08:37)  HR: 93 (05 Dec 2018 11:00) (93 - 104)  BP: 116/56 (05 Dec 2018 11:00) (102/51 - 138/63)  BP(mean): 80 (05 Dec 2018 11:) (73 - 91)  RR: 14 (05 Dec 2018 11:) (12 - 31)  SpO2: 97% (05 Dec 2018 11:00) (92% - 97%)     @ :  -   @ 07:00  --------------------------------------------------------  IN: 1914 mL / OUT: 1030 mL / NET: 884 mL     @ :  -   @ 13:15  --------------------------------------------------------  IN: 50 mL / OUT: 220 mL / NET: -170 mL    PHYSICAL EXAM: Rt IJ TLC , Beasley cath  GENERAL:  [x  ] NAD , [ x ] well appearing, [  ] Agitated, [  ] Drowsy,  [  ] Lethargy, [  ] confused   HEAD:  [ x ] Normal, [  ] Other  EYES:  [ x ] EOMI, [x  ] PERRLA, [ x ] conjunctiva and sclera clear normal, [  ] Other,  [x  ] Pallor,[  ] Discharge  ENMT:  [x  ] Normal, [ x ] Moist mucous membranes, [ x ] Good dentition, [ x ] No Thrush  NECK:  [x  ] Supple, [ x ] No JVD, [x  ] Normal thyroid, [  ] Lymphadenopathy [  ] Other  CHEST/LUNG:  [x  ] Clear to auscultation bilaterally, [ x ] Breath Sounds equal B/L,  [x  ] No rales, [x  ] No rhonchi  [ x ]  No wheezing  HEART:  [x  ] irregular rate and regular rhythm, [ x ] tachycardia, [  ] Bradycardia,  [  ] irregular  [ x] No murmurs, No rubs, No gallops, [  ] PPM in place (Mfr:  )  ABDOMEN:  [x  ] Soft, [ x ] Nontender, [x  ] Nondistended, [x  ] No mass, [x  ] Bowel sounds present, [ x ] obese  NERVOUS SYSTEM:  [x  ] Alert & Oriented X3, [ x ] Nonfocal  [  ] Confusion  [  ] Encephalopathic [  ] Sedated [  ] Unable to assess, [  ] Other-  EXTREMITIES: [ x ] 2+ Peripheral Pulses, No clubbing, No cyanosis,  [  ] edema B/L lower EXT. [  ] PVD stasis skin changes B/L Lower EXT  LYMPH: No lymphadenopathy noted  SKIN:  [ x ] No new rashes or lesions, [  ] Pressure Ulcers, [  ] ecchymosis, [  ] Skin Tears, [  ] Other    DIET: Regular Carbohydrate Diet w/ Evening Snack    LABS:                        7.8    9.35  )-----------( 106      ( 05 Dec 2018 05:59 )             22.5     12-    140  |  104  |  29<H>  ----------------------------<  65<L>  3.4<L>   |  29  |  0.98    Ca    7.4<L>      05 Dec 2018 05:59  Phos  2.1     12-  Mg     1.9     12-    TPro  4.1<L>  /  Alb  1.8<L>  /  TBili  2.9<H>  /  DBili  x   /  AST  36  /  ALT  19  /  AlkPhos  72  12-    LIVER FUNCTIONS - ( 05 Dec 2018 05:59 )  Alb: 1.8 g/dL / Pro: 4.1 g/dL / ALK PHOS: 72 U/L / ALT: 19 U/L / AST: 36 U/L / GGT: x           PT/INR - ( 04 Dec 2018 06:12 )   PT: 18.6 sec;   INR: 1.61 ratio         PTT - ( 04 Dec 2018 06:12 )  PTT:27.7 sec    Lactate, Blood (12.03.18 @ 06:24)    Lactate, Blood: 2.5 mmol/L      Lactate, Blood (12.02.18 @ 12:52)    Lactate, Blood: 4.2 mmol/L    Hemoglobin A1C, Whole Blood (12.01.18 @ 10:32)  Hemoglobin A1C, Whole Blood: 8.3: Method: Immunoassay    Urinalysis Basic - ( 2018 18:30 )    Color: Yellow / Appearance: Slightly Turbid / S.015 / pH: x  Gluc: x / Ketone: Trace  / Bili: Small / Urobili: Negative   Blood: x / Protein: 25 mg/dL / Nitrite: Negative   Leuk Esterase: Trace / RBC: 6-10 /HPF / WBC 3-5   Sq Epi: x / Non Sq Epi: Occasional / Bacteria: Occasional    Culture Results:   No growth to date. ( @ 20:55)  Culture Results:   No growth to date. ( @ 20:55)  Culture Results:   <10,000 CFU/ml Normal Urogenital ninfa present ( @ 20:19)    culture blood  -- .Blood Blood  @ 20:55    culture urine  --   @ 20:55  culture blood  -- .Urine Clean Catch (Midstream)  @ 20:19    culture urine  --   @ 20:19    CARDIAC MARKERS ( 02 Dec 2018 08:40 )  x     / x     / 504 U/L / x     / x      CARDIAC MARKERS ( 2018 23:51 )  .019 ng/mL / x     / 301 U/L / x     / 2.8 ng/mL  CARDIAC MARKERS ( 2018 16:25 )  .019 ng/mL / x     / x     / x     / x        Culture - Blood (collected 2018 20:55)  Source: .Blood Blood  Preliminary Report (01 Dec 2018 21:01):    No growth to date.    Culture - Blood (collected 2018 20:55)  Source: .Blood Blood  Preliminary Report (01 Dec 2018 21:01):    No growth to date.    Culture - Urine (collected 2018 20:19)  Source: .Urine Clean Catch (Midstream)  Final Report (01 Dec 2018 15:16):    <10,000 CFU/ml Normal Urogenital ninfa present    Lipase, Serum: 70 U/L (18 @ 16:25)    RADIOLOGY & ADDITIONAL TESTS:  < from: Xray Chest 1 View- PORTABLE-Urgent (18 @ 23:39) >    EXAM:  XR CHEST PORTABLE URGENT 1V                            PROCEDURE DATE:  2018        INTERPRETATION:  Portable chest radiograph       CLINICAL INFORMATION: Line placement. GI bleeding.    TECHNIQUE:  Single portable frontal AP view ofthe chest was obtained.    FINDINGS: The examination of 10/17/2018 is available for review.    The lungs are free of infiltrate.     No pleural abnormality is seen.      The heart and mediastinum appear unchanged. The tip of the right IJ line   is projecting over the superior vena cava    No destructive lesion is seen in the visualized skeleton.    IMPRESSION:   No infiltrate.      < end of copied text >    HEALTH ISSUES - PROBLEM Dx:  GIB (gastrointestinal bleeding): GIB (gastrointestinal bleeding)  Lactic acid blood increased: Lactic acid blood increased  Prophylactic measure: Prophylactic measure  Pedal edema: Pedal edema  Neuropathy: Neuropathy  Hypercholesteremia: Hypercholesteremia  Hypertension: Hypertension  Fatty liver disease, nonalcoholic: Fatty liver disease, nonalcoholic  Hepatic encephalopathy: Hepatic encephalopathy  Diabetes: Diabetes  Near syncope: Near syncope  Vomiting: Vomiting    Consultant(s) Notes Reviewed:  [ x ] YES     Care Discussed with [X] Consultants  [  x] Patient  [  ] Family  [  ]   [  ] Social Service  [ x ] RN, [  ] Physical Therapy  DVT PPX: [  ] Lovenox, [  ] S C Heparin, [  ] Coumadin, [  ] Xarelto, [  ] Eliquis, [  ] Pradaxa, [  ] IV Heparin drip, [ x ] SCD [  ] Contraindication 2 to GI Bleed,[  ] Ambulation  Advanced directive: [x  ] None, [  ] DNR/DNI Patient is a 62y old  Male who presents with a chief complaint of vomiting and near syncope, upper GI bleeding (02 Dec 2018 13:00)      INTERVAL HPI:  Pt is a 61 y/o M with PMHx of Type 2 DM, HTN, HLD, obesity, nephrolithiasis (last stone 25 years ago), neuropathy, HE, NAFLD, Hyper ammonemia, recent Gastritis/ esophagitis on EGD & Gram variable teddy bacteremia in 10/2018  who presents with vomiting and  near syncope/falling out of a moving car. Pt [place] and [place]", his vision was cloudy, he pulled over the car and stopped and vomited. Vomitus was "creamy white." Pt denies seeing blood in vomitus. His dtr then took over driving and Pt again felt like he needed to vomit and told dtr to pull over. He thought the car had stopped and he opened the door and fell out of the moving car. He laid on the ground for ~ 15 minutes. Pt says he possibly lost consciousness. States when he was lying on the ground felt very cold. Pt has had "terrible heartburn" for 2 days and felt chills for 2 days. Pt denies abdominal pain, CP, palp, SOB, cough, myalgias, rash, HA. Pt admits to chills, vomiting (4 episodes), diarrhea (from lactulose), dizziness, pedal edema, tingling in hands (chronic, intermittent). Pt denies recent travel, sick contacts, different eating patterns.    Pt states that early this week pt saw her Endocrinologist & started back on Metformin 500 mg daily as per Dr's recommendation.  In ED Pt's vitals were: T(C): 36.3, HR: 91, BP: 85/59, RR: 15, SpO2: 98% on RA  Labs significant for lactate 5.6, , Hgb 10.4, total bili 3.7, alk phos 183, ammonia 72, Mg 1.4. UA - trace LE, mod blood, RBC 6-10, urine glucose 1,000.   CXR - negative chest  Abd Xray - There is mild content in the colon and no overt sense of bowel obstruction.  CT head - no intracranial hemorrhage or mass effect  CT cervical - Multilevel degenerative disc disease/cervical spondylosis, with posterior osteophyte disc complex C6-7 resulting in flattening of the cervical cord. If there is a clinical concern for spinal cord injury, recommend further evaluation with MRI if there are no clinical contraindications.  EKG - NSR, 93bpm    Pt given 2L NS bolus, Zofran pantoprazole, famotidine, Carafate , Pt seen by GI Dr Morales in ER & was admitted for further Eval.    18: Pt seen, examined, Had total 4 bloody BM so far , Lactate improved, pt had CT A/P with IV Contrast. Drop in H/H. plan for, start Protonix drip,  2 u pRBC 1 u FFP & 5 mg Vit K x 1 as d/w Dr Franks -GI, Elevated ammonia level. Coagulopathy, Improving Lactate. Ac Blood loss Anemia.    18: Pt seen, Examined, pt was transferred to ICU on 18 for symptomatic upper GI Bleed, S/P 7  units pRBC in Total , 3 FFP, 1 platelets transfusion given, Pt had emergent EGD done that found Ac Bleeding Duodenal ulcer found. S/P Epi given. Homeostasis obtained, Pt seen By Sx Dr Hernandez. H/H Stable,  today with MISA, Coagulopathy , Leukocytosis & High Lactate level.  H/H Low stable with Ac Blood  Loss Anemia. Hematology Dr Rios called, as pt is followed by Dr Rios- H/O as out pt.    12/3/18: Patient seen and examined this morning at bedside. Patient denies any acute complaints this morning. Hgb drop from 8.2 --> 7.7. Leukocytosis present at 15.08. Lactate level drop from 3.2 --> 2.5    19: Patient seen and examined this morning. Patient was sitting out of bed in chair, awake, alert, and oriented x3. Hb dropped to 6.6.  Patient states he is feeling better since yesterday. Patient had EGD done yesterday that showed large bleeding Duodenal ulcer s/p clips x2, epi injection, and cautery. Patient was given 3 units of packed red blood cells yesterday. Hbg increased from 7.3 --> 7.8. WBC count drop from 12.53 --> 9.47. Patient tolerating ice chips and clear liquid diet. Patient denies fever, chills, headache, weakness chest pain, palpitations, sob, N/V, diarrhea, constipation, blood in stool, urinary symptoms. Patient admits to feeling fatigued. Has not had a BM today.     18: patient seen, examined at bedside, patient lying comfortably in bed. Hgb at 7.8. WBC 9.35, patient tolerating clear liquid diet. Patient denies fevers, chills, CP, palpitations, N/V/D. As per nurse, BM are still dark. Patient is asymptomatic at this time. On Regular diet.     18: Patient seen and examine at bedside this morning. Patient lying comfortably in bed. Patient was febrile overnight 102F. Tylenol prn, AVOID NSAIDS (risk of rebleed). Hgb drop 7.8 --> 7.4. WBC 8.63. Creatinine up .98-->1.3. Tolerating regular carbohydrate diet, ate breakfast this morning. States that this was his second meal and has not had any associated N/V. Patient states he had a BM earlier this morning that was normal in consistency without any blood in stool. Patient is asymptomatic at this time. Patients BP is in 100/50's this AM, patient states that his pressure normal runs between "100-110". Patient denies headache, fatigue, weakness, chest pain, sob, palpitations, N/V, abdominal pain, diarrhea, constipation.     OVERNIGHT EVENTS: NONE    Home Medications:  Bacid (LAC) oral tablet: 1 tab(s) orally 2 times a day for  2weeks (2018 19:41)  HumaLOG 100 units/mL subcutaneous solution: 20 unit(s) subcutaneous 2 times a day(breakfast and lunch) (2018 19:41)  HumaLOG 100 units/mL subcutaneous solution: 30 unit(s) subcutaneous once a day(@dinner) (2018 19:41)  irbesartan 300 mg oral tablet: 1 tab(s) orally once a day (2018 19:41)  lactulose 10 g/15 mL oral syrup: 15 milliliter(s) orally 3 times a day (2018 19:41)  Lasix 20 mg oral tablet: 1 tab(s) orally once a day (2018 19:41)  pantoprazole 40 mg oral delayed release tablet: 1 tab(s) orally once a day (2018 19:41)  phytonadione 100 mcg oral tablet: 1 tab(s) orally once a day (2018 19:41)  potassium chloride 10 mEq oral tablet, extended release: 1 tab(s) orally once a day (2018 19:41)  pravastatin 40 mg oral tablet: 1 tab(s) orally once a day (2018 19:41)  Vitamin D3 2000 intl units oral tablet: 1 tab(s) orally once a day (2018 19:41)      MEDICATIONS  (STANDING):  dextrose 5%. 1000 milliLiter(s) (50 mL/Hr) IV Continuous <Continuous>  dextrose 50% Injectable 12.5 Gram(s) IV Push once  dextrose 50% Injectable 25 Gram(s) IV Push once  dextrose 50% Injectable 25 Gram(s) IV Push once  gabapentin 200 milliGRAM(s) Oral four times a day  insulin glargine Injectable (LANTUS) 22 Unit(s) SubCutaneous at bedtime  insulin lispro (HumaLOG) corrective regimen sliding scale   SubCutaneous every 6 hours  pantoprazole Infusion 8 mG/Hr (10 mL/Hr) IV Continuous <Continuous>  rifaximin 550 milliGRAM(s) Oral two times a day  sucralfate 1 Gram(s) Oral every 6 hours  ursodiol Capsule 300 milliGRAM(s) Oral three times a day    MEDICATIONS  (PRN):  dextrose 40% Gel 15 Gram(s) Oral once PRN Blood Glucose LESS THAN 70 milliGRAM(s)/deciliter  glucagon  Injectable 1 milliGRAM(s) IntraMuscular once PRN Glucose LESS THAN 70 milligrams/deciliter      Allergies    codeine (Anaphylaxis)    Intolerances    REVIEW OF SYSTEMS: "I feel good"  CONSTITUTIONAL: Admits to 102F fever overnight - afebrile now. No chills No myalgia, No Body ache, No Weakness.   EYES: No eye pain,  No visual disturbances, No discharge, NO Redness  ENMT:  No ear pain, No nose bleed, No vertigo; No sinus or throat pain, No Congestion  NECK: No pain, No stiffness  RESPIRATORY: No cough, wheezing, No hemoptysis, No shortness of breath  CARDIOVASCULAR: No chest pain, palpitations  GASTROINTESTINAL: No abdominal or epigastric pain. No nausea, No vomiting; No diarrhea or constipation. [ X ] BM  GENITOURINARY: No dysuria, No frequency, No urgency, No hematuria, or incontinence  NEUROLOGICAL: No headaches, No dizziness, No numbness, No tingling, No tremors, No weakness  EXT: No Swelling, No Pain, No Edema  SKIN:  [ x ] No itching, burning, new rashes, or lesions   MUSCULOSKELETAL: No joint pain. No muscle pain, No back pain, No extremity pain  PSYCHIATRIC: No depression, anxiety, mood swings or difficulty sleeping at night  PAIN SCALE: [x  ] None  [  ] Other-  ROS Unable to obtain due to - [  ] Dementia  [  ] Lethargy  [  ] Sedated [  ] non verbal  REST OF REVIEW Of SYSTEM - [ x ] Normal     Vital Signs Last 24 Hrs  T(C): 37.3 (05 Dec 2018 08:37), Max: 37.3 (05 Dec 2018 08:37)  T(F): 99.1 (05 Dec 2018 08:37), Max: 99.1 (05 Dec 2018 08:37)  HR: 93 (05 Dec 2018 11:00) (93 - 104)  BP: 116/56 (05 Dec 2018 11:00) (102/51 - 138/63)  BP(mean): 80 (05 Dec 2018 11:00) (73 - 91)  RR: 14 (05 Dec 2018 11:00) (12 - 31)  SpO2: 97% (05 Dec 2018 11:00) (92% - 97%)     @ :  -   @ 07:00  --------------------------------------------------------  IN: 1914 mL / OUT: 1030 mL / NET: 884 mL     @ 07:01  -   @ 13:15  --------------------------------------------------------  IN: 50 mL / OUT: 220 mL / NET: -170 mL    PHYSICAL EXAM: Rt IJ TLC , Beasley cath  GENERAL:  [x  ] NAD , [ x ] well appearing, [  ] Agitated, [  ] Drowsy,  [  ] Lethargy, [  ] confused   HEAD:  [ x ] Normal, [  ] Other  EYES:  [ x ] EOMI, [x  ] PERRLA, [ x ] conjunctiva and sclera clear normal, [  ] Other,  [x  ] Pallor,[  ] Discharge  ENMT:  [x  ] Normal, [ x ] Moist mucous membranes, [ x ] Good dentition, [ x ] No Thrush  NECK:  [x  ] Supple, [ x ] No JVD, [x  ] Normal thyroid, [  ] Lymphadenopathy [  ] Other  CHEST/LUNG:  [x  ] Clear to auscultation bilaterally, [ x ] Breath Sounds equal B/L,  [x  ] No rales, [x  ] No rhonchi  [ x ]  No wheezing  HEART:  [x  ] irregular rate and regular rhythm, [ x ] tachycardia, [  ] Bradycardia,  [  ] irregular  [ x] No murmurs, No rubs, No gallops, [  ] PPM in place (Mfr:  )  ABDOMEN:  [x  ] Soft, [ x ] Nontender, [x  ] Nondistended, [x  ] No mass, [x  ] Bowel sounds present, [ x ] obese  NERVOUS SYSTEM:  [x  ] Alert & Oriented X3, [ x ] Nonfocal  [  ] Confusion  [  ] Encephalopathic [  ] Sedated [  ] Unable to assess, [  ] Other-  EXTREMITIES: [ x ] 2+ Peripheral Pulses, No clubbing, No cyanosis,  [  ] edema B/L lower EXT. [  ] PVD stasis skin changes B/L Lower EXT  LYMPH: No lymphadenopathy noted  SKIN:  [ x ] No new rashes or lesions, [  ] Pressure Ulcers, [  ] ecchymosis, [  ] Skin Tears, [  ] Other    DIET: Regular Carbohydrate Diet w/ Evening Snack    LABS:                        7.8    9.35  )-----------( 106      ( 05 Dec 2018 05:59 )             22.5     12-05    140  |  104  |  29<H>  ----------------------------<  65<L>  3.4<L>   |  29  |  0.98    Ca    7.4<L>      05 Dec 2018 05:59  Phos  2.1     12-05  Mg     1.9     12-05    TPro  4.1<L>  /  Alb  1.8<L>  /  TBili  2.9<H>  /  DBili  x   /  AST  36  /  ALT  19  /  AlkPhos  72  12-05    LIVER FUNCTIONS - ( 05 Dec 2018 05:59 )  Alb: 1.8 g/dL / Pro: 4.1 g/dL / ALK PHOS: 72 U/L / ALT: 19 U/L / AST: 36 U/L / GGT: x           PT/INR - ( 04 Dec 2018 06:12 )   PT: 18.6 sec;   INR: 1.61 ratio         PTT - ( 04 Dec 2018 06:12 )  PTT:27.7 sec    Lactate, Blood (12.03.18 @ 06:24)    Lactate, Blood: 2.5 mmol/L      Lactate, Blood (12.02.18 @ 12:52)    Lactate, Blood: 4.2 mmol/L    Hemoglobin A1C, Whole Blood (12.01.18 @ 10:32)  Hemoglobin A1C, Whole Blood: 8.3: Method: Immunoassay    Urinalysis Basic - ( 2018 18:30 )    Color: Yellow / Appearance: Slightly Turbid / S.015 / pH: x  Gluc: x / Ketone: Trace  / Bili: Small / Urobili: Negative   Blood: x / Protein: 25 mg/dL / Nitrite: Negative   Leuk Esterase: Trace / RBC: 6-10 /HPF / WBC 3-5   Sq Epi: x / Non Sq Epi: Occasional / Bacteria: Occasional    Culture Results:   No growth to date. ( @ 20:55)  Culture Results:   No growth to date. ( @ 20:55)  Culture Results:   <10,000 CFU/ml Normal Urogenital ninfa present ( @ 20:19)    culture blood  -- .Blood Blood  @ 20:55    culture urine  --   @ 20:55  culture blood  -- .Urine Clean Catch (Midstream)  @ 20:19    culture urine  --   @ 20:19    CARDIAC MARKERS ( 02 Dec 2018 08:40 )  x     / x     / 504 U/L / x     / x      CARDIAC MARKERS ( 2018 23:51 )  .019 ng/mL / x     / 301 U/L / x     / 2.8 ng/mL  CARDIAC MARKERS ( 2018 16:25 )  .019 ng/mL / x     / x     / x     / x        Culture - Blood (collected 2018 20:55)  Source: .Blood Blood  Preliminary Report (01 Dec 2018 21:01):    No growth to date.    Culture - Blood (collected 2018 20:55)  Source: .Blood Blood  Preliminary Report (01 Dec 2018 21:01):    No growth to date.    Culture - Urine (collected 2018 20:19)  Source: .Urine Clean Catch (Midstream)  Final Report (01 Dec 2018 15:16):    <10,000 CFU/ml Normal Urogenital ninfa present    Lipase, Serum: 70 U/L (18 @ 16:25)    RADIOLOGY & ADDITIONAL TESTS:  < from: Xray Chest 1 View- PORTABLE-Urgent (18 @ 23:39) >    EXAM:  XR CHEST PORTABLE URGENT 1V                            PROCEDURE DATE:  2018        INTERPRETATION:  Portable chest radiograph       CLINICAL INFORMATION: Line placement. GI bleeding.    TECHNIQUE:  Single portable frontal AP view ofthe chest was obtained.    FINDINGS: The examination of 10/17/2018 is available for review.    The lungs are free of infiltrate.     No pleural abnormality is seen.      The heart and mediastinum appear unchanged. The tip of the right IJ line   is projecting over the superior vena cava    No destructive lesion is seen in the visualized skeleton.    IMPRESSION:   No infiltrate.      < end of copied text >    HEALTH ISSUES - PROBLEM Dx:  GIB (gastrointestinal bleeding): GIB (gastrointestinal bleeding)  Lactic acid blood increased: Lactic acid blood increased  Prophylactic measure: Prophylactic measure  Pedal edema: Pedal edema  Neuropathy: Neuropathy  Hypercholesteremia: Hypercholesteremia  Hypertension: Hypertension  Fatty liver disease, nonalcoholic: Fatty liver disease, nonalcoholic  Hepatic encephalopathy: Hepatic encephalopathy  Diabetes: Diabetes  Near syncope: Near syncope  Vomiting: Vomiting    Consultant(s) Notes Reviewed:  [ x ] YES     Care Discussed with [X] Consultants  [  x] Patient  [  ] Family  [  ]   [  ] Social Service  [ x ] RN, [  ] Physical Therapy  DVT PPX: [  ] Lovenox, [  ] S C Heparin, [  ] Coumadin, [  ] Xarelto, [  ] Eliquis, [  ] Pradaxa, [  ] IV Heparin drip, [ x ] SCD [  ] Contraindication 2 to GI Bleed,[  ] Ambulation  Advanced directive: [x  ] None, [  ] DNR/DNI Patient is a 62y old  Male who presents with a chief complaint of vomiting and near syncope, upper GI bleeding (02 Dec 2018 13:00)      INTERVAL HPI:  Pt is a 63 y/o M with PMHx of Type 2 DM, HTN, HLD, obesity, nephrolithiasis (last stone 25 years ago), neuropathy, HE, NAFLD, Hyper ammonemia, recent Gastritis/ esophagitis on EGD & Gram variable teddy bacteremia in 10/2018  who presents with vomiting and  near syncope/falling out of a moving car. Pt [place] and felt [place]", his vision was cloudy, he pulled over the car and stopped and vomited. Vomitus was "creamy white." Pt denies seeing blood in vomitus. His dtr then took over driving and Pt again felt like he needed to vomit and told dtr to pull over. He thought the car had stopped and he opened the door and fell out of the moving car. He laid on the ground for ~ 15 minutes. Pt says he possibly lost consciousness. States when he was lying on the ground felt very cold. Pt has had "terrible heartburn" for 2 days and felt chills for 2 days. Pt denies abdominal pain, CP, palp, SOB, cough, myalgias, rash, HA. Pt admits to chills, vomiting (4 episodes), diarrhea (from lactulose), dizziness, pedal edema, tingling in hands (chronic, intermittent). Pt denies recent travel, sick contacts, different eating patterns.    Pt states that early this week pt saw her Endocrinologist & started back on Metformin 500 mg daily as per Dr's recommendation.  In ED Pt's vitals were: T(C): 36.3, HR: 91, BP: 85/59, RR: 15, SpO2: 98% on RA  Labs significant for lactate 5.6, , Hgb 10.4, total bili 3.7, alk phos 183, ammonia 72, Mg 1.4. UA - trace LE, mod blood, RBC 6-10, urine glucose 1,000.   CXR - negative chest  Abd Xray - There is mild content in the colon and no overt sense of bowel obstruction.  CT head - no intracranial hemorrhage or mass effect  CT cervical - Multilevel degenerative disc disease/cervical spondylosis, with posterior osteophyte disc complex C6-7 resulting in flattening of the cervical cord. If there is a clinical concern for spinal cord injury, recommend further evaluation with MRI if there are no clinical contraindications.  EKG - NSR, 93bpm    Pt given 2L NS bolus, Zofran pantoprazole, famotidine, Carafate , Pt seen by GI Dr Morales in ER & was admitted for further Eval.    18: Pt seen, examined, Had total 4 bloody BM so far , Lactate improved, pt had CT A/P with IV Contrast. Drop in H/H. plan for, start Protonix drip,  2 u pRBC 1 u FFP & 5 mg Vit K x 1 as d/w Dr Franks -GI, Elevated ammonia level. Coagulopathy, Improving Lactate. Ac Blood loss Anemia.    18: Pt seen, Examined, pt was transferred to ICU on 18 for symptomatic upper GI Bleed, S/P 7  units pRBC in Total , 3 FFP, 1 platelets transfusion given, Pt had emergent EGD done that found Ac Bleeding Duodenal ulcer found. S/P Epi given. Homeostasis obtained, Pt seen By Sx Dr Hernandez. H/H Stable,  today with MISA, Coagulopathy , Leukocytosis & High Lactate level.  H/H Low stable with Ac Blood  Loss Anemia. Hematology Dr Rios called, as pt is followed by Dr Rios- H/O as out pt.    12/3/18: Patient seen and examined this morning at bedside. Patient denies any acute complaints this morning. Hgb drop from 8.2 --> 7.7. Leukocytosis present at 15.08. Lactate level drop from 3.2 --> 2.5    19: Patient seen and examined this morning. Patient was sitting out of bed in chair, awake, alert, and oriented x3. Hb dropped to 6.6.  Patient states he is feeling better since yesterday. Patient had EGD done yesterday that showed large bleeding Duodenal ulcer s/p clips x2, epi injection, and cautery. Patient was given 3 units of packed red blood cells yesterday. Hbg increased from 7.3 --> 7.8. WBC count drop from 12.53 --> 9.47. Patient tolerating ice chips and clear liquid diet. Patient denies fever, chills, headache, weakness chest pain, palpitations, sob, N/V, diarrhea, constipation, blood in stool, urinary symptoms. Patient admits to feeling fatigued. Has not had a BM today.     18: patient seen, examined at bedside, patient lying comfortably in bed. Hgb at 7.8. WBC 9.35, patient tolerating clear liquid diet. Patient denies fevers, chills, CP, palpitations, N/V/D. As per nurse, BM are still dark. Patient is asymptomatic at this time. On Regular diet.     18: Patient seen and examine at bedside this morning. Patient lying comfortably in bed. Patient was febrile overnight 102F. Tylenol prn, AVOID NSAIDS (risk of rebleed). Hgb drop 7.8 --> 7.4. repeat H/H 8.6.  WBC 8.63. Creatinine up .98-->1.3. Tolerating regular carbohydrate diet, ate breakfast this morning. States that this was his second meal and has not had any associated N/V. Patient states he had a BM earlier this morning that was normal in consistency without any blood in stool. Patient is asymptomatic at this time. Patients BP is in 100/50's this AM, patient states that his pressure normal runs between "100-110". Patient denies headache, fatigue, weakness, chest pain, sob, palpitations, N/V, abdominal pain, diarrhea, constipation. PT saw aptient today and alerted patient felt light-headed. Will re-assess.     OVERNIGHT EVENTS: NONE    Home Medications:  Bacid (LAC) oral tablet: 1 tab(s) orally 2 times a day for  2weeks (2018 19:41)  HumaLOG 100 units/mL subcutaneous solution: 20 unit(s) subcutaneous 2 times a day(breakfast and lunch) (2018 19:41)  HumaLOG 100 units/mL subcutaneous solution: 30 unit(s) subcutaneous once a day(@dinner) (2018 19:41)  irbesartan 300 mg oral tablet: 1 tab(s) orally once a day (2018 19:41)  lactulose 10 g/15 mL oral syrup: 15 milliliter(s) orally 3 times a day (2018 19:41)  Lasix 20 mg oral tablet: 1 tab(s) orally once a day (2018 19:41)  pantoprazole 40 mg oral delayed release tablet: 1 tab(s) orally once a day (2018 19:41)  phytonadione 100 mcg oral tablet: 1 tab(s) orally once a day (2018 19:41)  potassium chloride 10 mEq oral tablet, extended release: 1 tab(s) orally once a day (2018 19:41)  pravastatin 40 mg oral tablet: 1 tab(s) orally once a day (2018 19:41)  Vitamin D3 2000 intl units oral tablet: 1 tab(s) orally once a day (2018 19:41)      MEDICATIONS  (STANDING):  dextrose 5%. 1000 milliLiter(s) (50 mL/Hr) IV Continuous <Continuous>  dextrose 50% Injectable 12.5 Gram(s) IV Push once  dextrose 50% Injectable 25 Gram(s) IV Push once  dextrose 50% Injectable 25 Gram(s) IV Push once  gabapentin 200 milliGRAM(s) Oral four times a day  insulin glargine Injectable (LANTUS) 22 Unit(s) SubCutaneous at bedtime  insulin lispro (HumaLOG) corrective regimen sliding scale   SubCutaneous every 6 hours  pantoprazole Infusion 8 mG/Hr (10 mL/Hr) IV Continuous <Continuous>  rifaximin 550 milliGRAM(s) Oral two times a day  sucralfate 1 Gram(s) Oral every 6 hours  ursodiol Capsule 300 milliGRAM(s) Oral three times a day    MEDICATIONS  (PRN):  dextrose 40% Gel 15 Gram(s) Oral once PRN Blood Glucose LESS THAN 70 milliGRAM(s)/deciliter  glucagon  Injectable 1 milliGRAM(s) IntraMuscular once PRN Glucose LESS THAN 70 milligrams/deciliter      Allergies    codeine (Anaphylaxis)    Intolerances    REVIEW OF SYSTEMS: "I feel good"  CONSTITUTIONAL: Admits to 102F fever overnight - afebrile now. No chills No myalgia, No Body ache, No Weakness.   EYES: No eye pain,  No visual disturbances, No discharge, NO Redness  ENMT:  No ear pain, No nose bleed, No vertigo; No sinus or throat pain, No Congestion  NECK: No pain, No stiffness  RESPIRATORY: No cough, wheezing, No hemoptysis, No shortness of breath  CARDIOVASCULAR: No chest pain, palpitations  GASTROINTESTINAL: No abdominal or epigastric pain. No nausea, No vomiting; No diarrhea or constipation. [ X ] BM  GENITOURINARY: No dysuria, No frequency, No urgency, No hematuria, or incontinence  NEUROLOGICAL: No headaches, No dizziness, No numbness, No tingling, No tremors, No weakness  EXT: No Swelling, No Pain, No Edema  SKIN:  [ x ] No itching, burning, new rashes, or lesions   MUSCULOSKELETAL: No joint pain. No muscle pain, No back pain, No extremity pain  PSYCHIATRIC: No depression, anxiety, mood swings or difficulty sleeping at night  PAIN SCALE: [x  ] None  [  ] Other-  ROS Unable to obtain due to - [  ] Dementia  [  ] Lethargy  [  ] Sedated [  ] non verbal  REST OF REVIEW Of SYSTEM - [ x ] Normal     Vital Signs Last 24 Hrs  T(C): 36.9 (06 Dec 2018 11:37), Max: 38.9 (05 Dec 2018 23:00)  T(F): 98.5 (06 Dec 2018 11:37), Max: 102 (05 Dec 2018 23:00)  HR: 94 (06 Dec 2018 11:37) (76 - 109)  BP: 107/64 (06 Dec 2018 11:37) (92/46 - 129/59)  BP(mean): 70 (06 Dec 2018 09:00) (64 - 85)  RR: 17 (06 Dec 2018 11:37) (10 - 32)  SpO2: 94% (06 Dec 2018 11:37) (91% - 99%)     @ :  -   @ 07:00  --------------------------------------------------------  IN: 1914 mL / OUT: 1030 mL / NET: 884 mL     @ 07:  -   @ 13:15  --------------------------------------------------------  IN: 50 mL / OUT: 220 mL / NET: -170 mL    PHYSICAL EXAM: Ramos awad  GENERAL:  [x  ] NAD , [ x ] well appearing, [  ] Agitated, [  ] Drowsy,  [  ] Lethargy, [  ] confused   HEAD:  [ x ] Normal, [  ] Other  EYES:  [ x ] EOMI, [x  ] PERRLA, [ x ] conjunctiva and sclera clear normal, [  ] Other,  [x  ] Pallor,[  ] Discharge  ENMT:  [x  ] Normal, [ x ] Moist mucous membranes, [ x ] Good dentition, [ x ] No Thrush  NECK:  [x  ] Supple, [ x ] No JVD, [x  ] Normal thyroid, [  ] Lymphadenopathy [  ] Other  CHEST/LUNG:  [x ] Clear to auscultation bilaterally, [ x ] Breath Sounds equal B/L,  [x  ] No rales, [x  ] No rhonchi  [ x ]  No wheezing  HEART:  [x  ] irregular rate and regular rhythm, [ x ] tachycardia, [  ] Bradycardia,  [  ] irregular  [ x] No murmurs, No rubs, No gallops, [  ] PPM in place (Mfr:  )  ABDOMEN:  [x  ] Soft, [ x ] Nontender, [x  ] Nondistended, [x  ] No mass, [x  ] Bowel sounds present, [ x ] obese  NERVOUS SYSTEM:  [x  ] Alert & Oriented X3, [ x ] Nonfocal  [  ] Confusion  [  ] Encephalopathic [  ] Sedated [  ] Unable to assess, [  ] Other-  EXTREMITIES: [ x ] 2+ Peripheral Pulses, No clubbing, No cyanosis,  [  ] edema B/L lower EXT. [  ] PVD stasis skin changes B/L Lower EXT  LYMPH: No lymphadenopathy noted  SKIN:  [ x ] No new rashes or lesions, [  ] Pressure Ulcers, [  ] ecchymosis, [  ] Skin Tears, [  ] Other    DIET: Regular Carbohydrate Diet w/ Evening Snack    LABS:                        8.6    x     )-----------( x        ( 06 Dec 2018 13:51 )             25.9     12-    139  |  104  |  29<H>  ----------------------------<  119<H>  3.6   |  28  |  1.30    Ca    7.0<L>      06 Dec 2018 05:34  Phos  2.8     12-  Mg     1.7     12-    TPro  4.3<L>  /  Alb  1.8<L>  /  TBili  2.7<H>  /  DBili  x   /  AST  29  /  ALT  21  /  AlkPhos  73  12-06    LIVER FUNCTIONS - ( 06 Dec 2018 05:34 )  Alb: 1.8 g/dL / Pro: 4.3 g/dL / ALK PHOS: 73 U/L / ALT: 21 U/L / AST: 29 U/L / GGT: x           Lactate, Blood (12.03.18 @ 06:24)    Lactate, Blood: 2.5 mmol/L      Lactate, Blood (12.02.18 @ 12:52)    Lactate, Blood: 4.2 mmol/L    Hemoglobin A1C, Whole Blood (12..18 @ 10:32)  Hemoglobin A1C, Whole Blood: 8.3: Method: Immunoassay    Urinalysis Basic - ( 2018 18:30 )    Color: Yellow / Appearance: Slightly Turbid / S.015 / pH: x  Gluc: x / Ketone: Trace  / Bili: Small / Urobili: Negative   Blood: x / Protein: 25 mg/dL / Nitrite: Negative   Leuk Esterase: Trace / RBC: 6-10 /HPF / WBC 3-5   Sq Epi: x / Non Sq Epi: Occasional / Bacteria: Occasional    Culture Results:   No growth to date. ( @ 20:55)  Culture Results:   No growth to date. ( @ 20:55)  Culture Results:   <10,000 CFU/ml Normal Urogenital ninfa present ( @ 20:19)    culture blood  -- .Blood Blood  @ 20:55    culture urine  --   @ 20:55  culture blood  -- .Urine Clean Catch (Midstream)  @ 20:19    culture urine  --   @ 20:19    CARDIAC MARKERS ( 02 Dec 2018 08:40 )  x     / x     / 504 U/L / x     / x      CARDIAC MARKERS ( 2018 23:51 )  .019 ng/mL / x     / 301 U/L / x     / 2.8 ng/mL  CARDIAC MARKERS ( 2018 16:25 )  .019 ng/mL / x     / x     / x     / x        Culture - Blood (collected 2018 20:55)  Source: .Blood Blood  Preliminary Report (01 Dec 2018 21:01):    No growth to date.    Culture - Blood (collected 2018 20:55)  Source: .Blood Blood  Preliminary Report (01 Dec 2018 21:01):    No growth to date.    Culture - Urine (collected 2018 20:19)  Source: .Urine Clean Catch (Midstream)  Final Report (01 Dec 2018 15:16):    <10,000 CFU/ml Normal Urogenital ninfa present    Lipase, Serum: 70 U/L (18 @ 16:25)    RADIOLOGY & ADDITIONAL TESTS:  < from: Xray Chest 1 View- PORTABLE-Urgent (18 @ 23:39) >    EXAM:  XR CHEST PORTABLE URGENT 1V                            PROCEDURE DATE:  2018        INTERPRETATION:  Portable chest radiograph       CLINICAL INFORMATION: Line placement. GI bleeding.    TECHNIQUE:  Single portable frontal AP view ofthe chest was obtained.    FINDINGS: The examination of 10/17/2018 is available for review.    The lungs are free of infiltrate.     No pleural abnormality is seen.      The heart and mediastinum appear unchanged. The tip of the right IJ line   is projecting over the superior vena cava    No destructive lesion is seen in the visualized skeleton.    IMPRESSION:   No infiltrate.      < end of copied text >    HEALTH ISSUES - PROBLEM Dx:  GIB (gastrointestinal bleeding): GIB (gastrointestinal bleeding)  Lactic acid blood increased: Lactic acid blood increased  Prophylactic measure: Prophylactic measure  Pedal edema: Pedal edema  Neuropathy: Neuropathy  Hypercholesteremia: Hypercholesteremia  Hypertension: Hypertension  Fatty liver disease, nonalcoholic: Fatty liver disease, nonalcoholic  Hepatic encephalopathy: Hepatic encephalopathy  Diabetes: Diabetes  Near syncope: Near syncope  Vomiting: Vomiting    Consultant(s) Notes Reviewed:  [ x ] YES     Care Discussed with [X] Consultants  [  x] Patient  [  ] Family  [  ]   [  ] Social Service  [ x ] RN, [  ] Physical Therapy  DVT PPX: [  ] Lovenox, [  ] S C Heparin, [  ] Coumadin, [  ] Xarelto, [  ] Eliquis, [  ] Pradaxa, [  ] IV Heparin drip, [ x ] SCD [  ] Contraindication 2 to GI Bleed,[  ] Ambulation  Advanced directive: [x  ] None, [  ] DNR/DNI Patient is a 62y old  Male who presents with a chief complaint of vomiting and near syncope, upper GI bleeding (02 Dec 2018 13:00)      INTERVAL HPI:  Pt is a 61 y/o M with PMHx of Type 2 DM, HTN, HLD, obesity, nephrolithiasis (last stone 25 years ago), neuropathy, HE, NAFLD, Hyper ammonemia, recent Gastritis/ esophagitis on EGD & Gram variable teddy bacteremia in 10/2018  who presents with vomiting and  near syncope/falling out of a moving car. Pt states [place] and felt [place]off", his vision was cloudy, he pulled over the car and stopped and vomited. Vomitus was "creamy white." Pt denies seeing blood in vomitus. His dtr then took over driving and Pt again felt like he needed to vomit and told dtr to pull over. He thought the car had stopped and he opened the door and fell out of the moving car. He laid on the ground for ~ 15 minutes. Pt says he possibly lost consciousness. States when he was lying on the ground felt very cold. Pt has had "terrible heartburn" for 2 days and felt chills for 2 days. Pt denies abdominal pain, CP, palp, SOB, cough, myalgias, rash, HA. Pt admits to chills, vomiting (4 episodes), diarrhea (from lactulose), dizziness, pedal edema, tingling in hands (chronic, intermittent). Pt denies recent travel, sick contacts, different eating patterns.    Pt states that early this week pt saw her Endocrinologist & started back on Metformin 500 mg daily as per Dr's recommendation.  In ED Pt's vitals were: T(C): 36.3, HR: 91, BP: 85/59, RR: 15, SpO2: 98% on RA  Labs significant for lactate 5.6, , Hgb 10.4, total bili 3.7, alk phos 183, ammonia 72, Mg 1.4. UA - trace LE, mod blood, RBC 6-10, urine glucose 1,000.   CXR - negative chest  Abd Xray - There is mild content in the colon and no overt sense of bowel obstruction.  CT head - no intracranial hemorrhage or mass effect  CT cervical - Multilevel degenerative disc disease/cervical spondylosis, with posterior osteophyte disc complex C6-7 resulting in flattening of the cervical cord. If there is a clinical concern for spinal cord injury, recommend further evaluation with MRI if there are no clinical contraindications.  EKG - NSR, 93bpm    Pt given 2L NS bolus, Zofran pantoprazole, famotidine, Carafate , Pt seen by GI Dr Morales in ER & was admitted for further Eval.    18: Pt seen, examined, Had total 4 bloody BM so far , Lactate improved, pt had CT A/P with IV Contrast. Drop in H/H. plan for, start Protonix drip,  2 u pRBC 1 u FFP & 5 mg Vit K x 1 as d/w Dr Franks -GI, Elevated ammonia level. Coagulopathy, Improving Lactate. Ac Blood loss Anemia.    18: Pt seen, Examined, pt was transferred to ICU on 18 for symptomatic upper GI Bleed, S/P 7  units pRBC in Total , 3 FFP, 1 platelets transfusion given, Pt had emergent EGD done that found Ac Bleeding Duodenal ulcer found. S/P Epi given. Homeostasis obtained, Pt seen By Sx Dr Hernandez. H/H Stable,  today with MISA, Coagulopathy , Leukocytosis & High Lactate level.  H/H Low stable with Ac Blood  Loss Anemia. Hematology Dr Rios called, as pt is followed by Dr Rios- H/O as out pt.    12/3/18: Patient seen and examined this morning at bedside. Patient denies any acute complaints this morning. Hgb drop from 8.2 --> 7.7. Leukocytosis present at 15.08. Lactate level drop from 3.2 --> 2.5    19: Patient seen and examined this morning. Patient was sitting out of bed in chair, awake, alert, and oriented x3. Hb dropped to 6.6.  Patient states he is feeling better since yesterday. Patient had EGD done yesterday that showed large bleeding Duodenal ulcer s/p clips x2, epi injection, and cautery. Patient was given 3 units of packed red blood cells yesterday. Hbg increased from 7.3 --> 7.8. WBC count drop from 12.53 --> 9.47. Patient tolerating ice chips and clear liquid diet. Patient denies fever, chills, headache, weakness chest pain, palpitations, sob, N/V, diarrhea, constipation, blood in stool, urinary symptoms. Patient admits to feeling fatigued. Has not had a BM today.     18: patient seen, examined at bedside, patient lying comfortably in bed. Hgb at 7.8. WBC 9.35, patient tolerating clear liquid diet. Patient denies fevers, chills, CP, palpitations, N/V/D. As per nurse, BM are still dark. Patient is asymptomatic at this time. On Regular diet.     18: Patient seen and examine at bedside this morning. Patient lying comfortably in bed. Patient was febrile overnight 102F. Tylenol prn, AVOID NSAIDS (risk of rebleed). Hgb drop 7.8 --> 7.4. repeat H/H 8.6.  WBC 8.63. Creatinine up .98-->1.3. Tolerating regular carbohydrate diet, ate breakfast this morning. States that this was his second meal and has not had any associated N/V. Patient states he had a BM earlier this morning that was normal in consistency without any blood in stool. Patient is asymptomatic at this time. Patients BP is in 100/50's this AM, patient states that his pressure normal runs between "100-110". Patient denies headache, fatigue, weakness, chest pain, sob, palpitations, N/V, abdominal pain, diarrhea, constipation. PT saw aptient today and alerted patient felt light-headed. Will re-assess. ID Dr Rios called. Repeat H/H stable.    OVERNIGHT EVENTS: NONE    Home Medications:  Bacid (LAC) oral tablet: 1 tab(s) orally 2 times a day for  2weeks (2018 19:41)  HumaLOG 100 units/mL subcutaneous solution: 20 unit(s) subcutaneous 2 times a day(breakfast and lunch) (2018 19:41)  HumaLOG 100 units/mL subcutaneous solution: 30 unit(s) subcutaneous once a day(@dinner) (2018 19:41)  irbesartan 300 mg oral tablet: 1 tab(s) orally once a day (2018 19:41)  lactulose 10 g/15 mL oral syrup: 15 milliliter(s) orally 3 times a day (2018 19:41)  Lasix 20 mg oral tablet: 1 tab(s) orally once a day (2018 19:41)  pantoprazole 40 mg oral delayed release tablet: 1 tab(s) orally once a day (2018 19:41)  phytonadione 100 mcg oral tablet: 1 tab(s) orally once a day (2018 19:41)  potassium chloride 10 mEq oral tablet, extended release: 1 tab(s) orally once a day (2018 19:41)  pravastatin 40 mg oral tablet: 1 tab(s) orally once a day (2018 19:41)  Vitamin D3 2000 intl units oral tablet: 1 tab(s) orally once a day (2018 19:41)      MEDICATIONS  (STANDING):  atorvastatin 10 milliGRAM(s) Oral at bedtime  dextrose 5%. 1000 milliLiter(s) (50 mL/Hr) IV Continuous <Continuous>  dextrose 50% Injectable 12.5 Gram(s) IV Push once  dextrose 50% Injectable 25 Gram(s) IV Push once  dextrose 50% Injectable 25 Gram(s) IV Push once  gabapentin 200 milliGRAM(s) Oral four times a day  insulin glargine Injectable (LANTUS) 50 Unit(s) SubCutaneous at bedtime  insulin lispro (HumaLOG) corrective regimen sliding scale   SubCutaneous Before meals and at bedtime  lactulose Syrup 15 Gram(s) Oral two times a day  pantoprazole    Tablet 40 milliGRAM(s) Oral two times a day  rifaximin 550 milliGRAM(s) Oral two times a day  sucralfate 1 Gram(s) Oral every 6 hours  ursodiol Capsule 300 milliGRAM(s) Oral three times a day      MEDICATIONS  (PRN):  MEDICATIONS  (PRN):  dextrose 40% Gel 15 Gram(s) Oral once PRN Blood Glucose LESS THAN 70 milliGRAM(s)/deciliter  glucagon  Injectable 1 milliGRAM(s) IntraMuscular once PRN Glucose LESS THAN 70 milligrams/deciliter  lidocaine 2% Gel 1 Application(s) Topical every 3 hours PRN pain      Allergies    codeine (Anaphylaxis)    Intolerances    REVIEW OF SYSTEMS: "I feel good"  CONSTITUTIONAL: Admits to 102F fever overnight - afebrile now. No chills No myalgia, No Body ache, No Weakness.   EYES: No eye pain,  No visual disturbances, No discharge, NO Redness  ENMT:  No ear pain, No nose bleed, No vertigo; No sinus or throat pain, No Congestion  NECK: No pain, No stiffness  RESPIRATORY: No cough, wheezing, No hemoptysis, No shortness of breath  CARDIOVASCULAR: No chest pain, palpitations  GASTROINTESTINAL: No abdominal or epigastric pain. No nausea, No vomiting; No diarrhea or constipation. [ X ] BM  GENITOURINARY: No dysuria, No frequency, No urgency, No hematuria, or incontinence  NEUROLOGICAL: No headaches, No dizziness, No numbness, No tingling, No tremors, No weakness  EXT: No Swelling, No Pain, No Edema  SKIN:  [ x ] No itching, burning, new rashes, or lesions   MUSCULOSKELETAL: No joint pain. No muscle pain, No back pain, No extremity pain  PSYCHIATRIC: No depression, anxiety, mood swings or difficulty sleeping at night  PAIN SCALE: [x  ] None  [  ] Other-  ROS Unable to obtain due to - [  ] Dementia  [  ] Lethargy  [  ] Sedated [  ] non verbal  REST OF REVIEW Of SYSTEM - [ x ] Normal     Vital Signs Last 24 Hrs  T(C): 36.9 (06 Dec 2018 11:37), Max: 38.9 (05 Dec 2018 23:00)  T(F): 98.5 (06 Dec 2018 11:37), Max: 102 (05 Dec 2018 23:00)  HR: 94 (06 Dec 2018 11:37) (76 - 109)  BP: 107/64 (06 Dec 2018 11:37) (92/46 - 129/59)  BP(mean): 70 (06 Dec 2018 09:00) (64 - 85)  RR: 17 (06 Dec 2018 11:37) (10 - 32)  SpO2: 94% (06 Dec 2018 11:37) (91% - 99%)     @ :  -   @ 07:00  --------------------------------------------------------  IN: 1914 mL / OUT: 1030 mL / NET: 884 mL     @ 07:  -   @ 13:15  --------------------------------------------------------  IN: 50 mL / OUT: 220 mL / NET: -170 mL    PHYSICAL EXAM: Ramos awad  GENERAL:  [x  ] NAD , [ x ] well appearing, [  ] Agitated, [  ] Drowsy,  [  ] Lethargy, [  ] confused   HEAD:  [ x ] Normal, [  ] Other  EYES:  [ x ] EOMI, [x  ] PERRLA, [ x ] conjunctiva and sclera clear normal, [  ] Other,  [x  ] Pallor,[  ] Discharge  ENMT:  [x  ] Normal, [ x ] Moist mucous membranes, [ x ] Good dentition, [ x ] No Thrush  NECK:  [x  ] Supple, [ x ] No JVD, [x  ] Normal thyroid, [  ] Lymphadenopathy [  ] Other  CHEST/LUNG:  [x ] Clear to auscultation bilaterally, [ x ] Breath Sounds equal B/L,  [x  ] No rales, [x  ] No rhonchi  [ x ]  No wheezing  HEART:  [x  ] irregular rate and regular rhythm, [ x ] tachycardia, [  ] Bradycardia,  [  ] irregular  [ x] No murmurs, No rubs, No gallops, [  ] PPM in place (Mfr:  )  ABDOMEN:  [x  ] Soft, [ x ] Nontender, [x  ] Nondistended, [x  ] No mass, [x  ] Bowel sounds present, [ x ] obese  NERVOUS SYSTEM:  [x  ] Alert & Oriented X3, [ x ] Nonfocal  [  ] Confusion  [  ] Encephalopathic [  ] Sedated [  ] Unable to assess, [  ] Other-  EXTREMITIES: [ x ] 2+ Peripheral Pulses, No clubbing, No cyanosis,  [  ] edema B/L lower EXT. [  ] PVD stasis skin changes B/L Lower EXT  LYMPH: No lymphadenopathy noted  SKIN:  [ x ] No new rashes or lesions, [  ] Pressure Ulcers, [  ] ecchymosis, [  ] Skin Tears, [  ] Other    DIET: Regular Carbohydrate Diet w/ Evening Snack    LABS:                        8.6    x     )-----------( x        ( 06 Dec 2018 13:51 )             25.9     12-06    139  |  104  |  29<H>  ----------------------------<  119<H>  3.6   |  28  |  1.30    Ca    7.0<L>      06 Dec 2018 05:34  Phos  2.8       Mg     1.7         TPro  4.3<L>  /  Alb  1.8<L>  /  TBili  2.7<H>  /  DBili  x   /  AST  29  /  ALT  21  /  AlkPhos  73  12    LIVER FUNCTIONS - ( 06 Dec 2018 05:34 )  Alb: 1.8 g/dL / Pro: 4.3 g/dL / ALK PHOS: 73 U/L / ALT: 21 U/L / AST: 29 U/L / GGT: x           Lactate, Blood (12.03.18 @ 06:24)    Lactate, Blood: 2.5 mmol/L      Lactate, Blood (12.02.18 @ 12:52)    Lactate, Blood: 4.2 mmol/L    Hemoglobin A1C, Whole Blood (12.01.18 @ 10:32)  Hemoglobin A1C, Whole Blood: 8.3: Method: Immunoassay    Urinalysis Basic - ( 2018 18:30 )    Color: Yellow / Appearance: Slightly Turbid / S.015 / pH: x  Gluc: x / Ketone: Trace  / Bili: Small / Urobili: Negative   Blood: x / Protein: 25 mg/dL / Nitrite: Negative   Leuk Esterase: Trace / RBC: 6-10 /HPF / WBC 3-5   Sq Epi: x / Non Sq Epi: Occasional / Bacteria: Occasional    Culture Results:   No growth to date. ( @ 20:55)  Culture Results:   No growth to date. ( @ 20:55)  Culture Results:   <10,000 CFU/ml Normal Urogenital ninfa present ( @ 20:19)    culture blood  -- .Blood Blood  @ 20:55    culture urine  --   @ 20:55  culture blood  -- .Urine Clean Catch (Midstream)  @ 20:19    culture urine  --   @ 20:19    CARDIAC MARKERS ( 02 Dec 2018 08:40 )  x     / x     / 504 U/L / x     / x      CARDIAC MARKERS ( 2018 23:51 )  .019 ng/mL / x     / 301 U/L / x     / 2.8 ng/mL  CARDIAC MARKERS ( 2018 16:25 )  .019 ng/mL / x     / x     / x     / x        Culture - Blood (collected 2018 20:55)  Source: .Blood Blood  Preliminary Report (01 Dec 2018 21:01):    No growth to date.    Culture - Blood (collected 2018 20:55)  Source: .Blood Blood  Preliminary Report (01 Dec 2018 21:01):    No growth to date.    Culture - Urine (collected 2018 20:19)  Source: .Urine Clean Catch (Midstream)  Final Report (01 Dec 2018 15:16):    <10,000 CFU/ml Normal Urogenital ninfa present    Lipase, Serum: 70 U/L (18 @ 16:25)    RADIOLOGY & ADDITIONAL TESTS:  < from: Xray Chest 1 View- PORTABLE-Urgent (18 @ 23:39) >    EXAM:  XR CHEST PORTABLE URGENT 1V                            PROCEDURE DATE:  2018        INTERPRETATION:  Portable chest radiograph       CLINICAL INFORMATION: Line placement. GI bleeding.    TECHNIQUE:  Single portable frontal AP view ofthe chest was obtained.    FINDINGS: The examination of 10/17/2018 is available for review.    The lungs are free of infiltrate.     No pleural abnormality is seen.      The heart and mediastinum appear unchanged. The tip of the right IJ line   is projecting over the superior vena cava    No destructive lesion is seen in the visualized skeleton.    IMPRESSION:   No infiltrate.      < end of copied text >    HEALTH ISSUES - PROBLEM Dx:  GIB (gastrointestinal bleeding): GIB (gastrointestinal bleeding)  Lactic acid blood increased: Lactic acid blood increased  Prophylactic measure: Prophylactic measure  Pedal edema: Pedal edema  Neuropathy: Neuropathy  Hypercholesteremia: Hypercholesteremia  Hypertension: Hypertension  Fatty liver disease, nonalcoholic: Fatty liver disease, nonalcoholic  Hepatic encephalopathy: Hepatic encephalopathy  Diabetes: Diabetes  Near syncope: Near syncope  Vomiting: Vomiting    Consultant(s) Notes Reviewed:  [ x ] YES     Care Discussed with [X] Consultants  [  x] Patient  [  ] Family  [  ]   [  ] Social Service  [ x ] RN, [  ] Physical Therapy  DVT PPX: [  ] Lovenox, [  ] S C Heparin, [  ] Coumadin, [  ] Xarelto, [  ] Eliquis, [  ] Pradaxa, [  ] IV Heparin drip, [ x ] SCD [  ] Contraindication 2 to GI Bleed,[  ] Ambulation  Advanced directive: [x  ] None, [  ] DNR/DNI

## 2018-12-06 NOTE — PHYSICAL THERAPY INITIAL EVALUATION ADULT - GENERAL OBSERVATIONS, REHAB EVAL
pt rec'd supine in bed in NAD with CBIR. RN in room cleared pt for PT Pt rec'd supine in tele bed in NAD with CB /c in reach. Pt recently transferred from ICU to tele. JODY Champagne in room. Pt was agreeable for PT evaluation.
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
## 2018-12-06 NOTE — PHYSICAL THERAPY INITIAL EVALUATION ADULT - BED MOBILITY TRAINING, PT EVAL
pt will perform bed mobility with min A within 3-5 visits Pt will perform bed mobility sup<->sit with Min Ax1 within 5 visits

## 2018-12-06 NOTE — PHYSICAL THERAPY INITIAL EVALUATION ADULT - BALANCE TRAINING, PT EVAL
pt will improve seated dynamic balance to good within 3-5 visits Pt will improve seated dynamic balance to Good within 3-5 visits and assess standing and dynamic balance /c appropriate /c in 3 visits

## 2018-12-06 NOTE — PROGRESS NOTE ADULT - SUBJECTIVE AND OBJECTIVE BOX
INTERVAL HPI/OVERNIGHT EVENTS:  pt seen and examined  states he feels fine, denies n/v/d/abd pina  per overnight rn febrile to 102 overnight, had normal bm, no s/s active gib  labs and vs reviewed    MEDICATIONS  (STANDING):  atorvastatin 10 milliGRAM(s) Oral at bedtime  dextrose 5%. 1000 milliLiter(s) (50 mL/Hr) IV Continuous <Continuous>  dextrose 50% Injectable 12.5 Gram(s) IV Push once  dextrose 50% Injectable 25 Gram(s) IV Push once  dextrose 50% Injectable 25 Gram(s) IV Push once  gabapentin 200 milliGRAM(s) Oral four times a day  insulin glargine Injectable (LANTUS) 50 Unit(s) SubCutaneous at bedtime  insulin lispro (HumaLOG) corrective regimen sliding scale   SubCutaneous Before meals and at bedtime  lactulose Syrup 15 Gram(s) Oral two times a day  pantoprazole    Tablet 40 milliGRAM(s) Oral two times a day  rifaximin 550 milliGRAM(s) Oral two times a day  sucralfate 1 Gram(s) Oral every 6 hours  ursodiol Capsule 300 milliGRAM(s) Oral three times a day    MEDICATIONS  (PRN):  acetaminophen   Tablet .. 650 milliGRAM(s) Oral every 6 hours PRN Temp greater or equal to 38C (100.4F)  dextrose 40% Gel 15 Gram(s) Oral once PRN Blood Glucose LESS THAN 70 milliGRAM(s)/deciliter  glucagon  Injectable 1 milliGRAM(s) IntraMuscular once PRN Glucose LESS THAN 70 milligrams/deciliter  lidocaine 2% Gel 1 Application(s) Topical every 3 hours PRN pain      Allergies    codeine (Anaphylaxis)    Intolerances        Review of Systems:    General:   fever  Eyes:  Good vision, no reported pain  ENT:  No sore throat, pain, runny nose, dysphagia  CV:  No pain, palpitations, hypo/hypertension  Resp:  No dyspnea, cough, tachypnea, wheezing  GI:  No pain, No nausea, No vomiting, No diarrhea, No constipation, No weight loss, No fever, No pruritis, No rectal bleeding, No melena, No dysphagia  :  No pain, bleeding, incontinence, nocturia  Muscle:  No pain, weakness  Neuro:  No weakness, tingling, memory problems  Psych:  No fatigue, insomnia, mood problems, depression  Endocrine:  No polyuria, polydypsia, cold/heat intolerance  Heme:  No petechiae, ecchymosis, easy bruisability  Skin:  No rash, tattoos, scars, edema      Vital Signs Last 24 Hrs  T(C): 37.8 (06 Dec 2018 10:36), Max: 38.9 (05 Dec 2018 23:00)  T(F): 100.1 (06 Dec 2018 10:36), Max: 102 (05 Dec 2018 23:00)  HR: 90 (06 Dec 2018 10:36) (76 - 109)  BP: 107/49 (06 Dec 2018 10:36) (92/46 - 129/59)  BP(mean): 70 (06 Dec 2018 09:00) (64 - 85)  RR: 17 (06 Dec 2018 10:36) (10 - 32)  SpO2: 96% (06 Dec 2018 10:36) (91% - 100%)    PHYSICAL EXAM:  Constitutional: NAD   HEENT: ncat  Neck: No LAD  Respiratory: dec bs  Cardiovascular: S1 and S2, RRR  Gastrointestinal: obese abd soft nt mild dt  Extremities: No peripheral edema  Vascular: 2+ peripheral pulses  Neurological: awake alert responds appropriately   Skin: mild jaundice      LABS:                        7.4    8.63  )-----------( 88       ( 06 Dec 2018 05:34 )             22.0     12-06    139  |  104  |  29<H>  ----------------------------<  119<H>  3.6   |  28  |  1.30    Ca    7.0<L>      06 Dec 2018 05:34  Phos  2.8     12-06  Mg     1.7     12-06    TPro  4.3<L>  /  Alb  1.8<L>  /  TBili  2.7<H>  /  DBili  x   /  AST  29  /  ALT  21  /  AlkPhos  73  12-06          RADIOLOGY & ADDITIONAL TESTS:
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
## 2018-12-06 NOTE — PROGRESS NOTE ADULT - SUBJECTIVE AND OBJECTIVE BOX
Neurology follow up note    JESSICA [id number]      Interval History:    Patient feels ok no new complaints.    MEDICATIONS    acetaminophen   Tablet .. 650 milliGRAM(s) Oral every 6 hours PRN  atorvastatin 10 milliGRAM(s) Oral at bedtime  dextrose 40% Gel 15 Gram(s) Oral once PRN  dextrose 5%. 1000 milliLiter(s) IV Continuous <Continuous>  dextrose 50% Injectable 12.5 Gram(s) IV Push once  dextrose 50% Injectable 25 Gram(s) IV Push once  dextrose 50% Injectable 25 Gram(s) IV Push once  gabapentin 200 milliGRAM(s) Oral four times a day  glucagon  Injectable 1 milliGRAM(s) IntraMuscular once PRN  insulin glargine Injectable (LANTUS) 50 Unit(s) SubCutaneous at bedtime  insulin lispro (HumaLOG) corrective regimen sliding scale   SubCutaneous three times a day before meals  insulin lispro (HumaLOG) corrective regimen sliding scale   SubCutaneous at bedtime  lactulose Syrup 15 Gram(s) Oral two times a day  lidocaine 2% Gel 1 Application(s) Topical every 3 hours PRN  pantoprazole    Tablet 40 milliGRAM(s) Oral two times a day  rifaximin 550 milliGRAM(s) Oral two times a day  sucralfate 1 Gram(s) Oral every 6 hours  ursodiol Capsule 300 milliGRAM(s) Oral three times a day      Allergies    codeine (Anaphylaxis)    Intolerances            Vital Signs Last 24 Hrs  T(C): 36.9 (06 Dec 2018 11:37), Max: 38.9 (05 Dec 2018 23:00)  T(F): 98.5 (06 Dec 2018 11:37), Max: 102 (05 Dec 2018 23:00)  HR: 94 (06 Dec 2018 11:37) (76 - 109)  BP: 114/62 (06 Dec 2018 13:00) (92/46 - 129/59)  BP(mean): 70 (06 Dec 2018 09:00) (64 - 85)  RR: 17 (06 Dec 2018 11:37) (10 - 32)  SpO2: 94% (06 Dec 2018 11:37) (91% - 99%)    REVIEW OF SYSTEMS:  Constitutional: No fever, chills, fatigue, generalized weakness  Eyes: no eye pain, visual disturbances, or discharge  ENT:  No difficulty hearing, tinnitus, vertigo; No sinus or throat pain  Neck: No pain or stiffness  Respiratory: No cough, dyspnea, wheezing   Cardiovascular: No chest pain, palpitations,   Gastrointestinal: No abdominal or epigastric pain. No nausea, vomiting  No diarrhea or constipation.   Genitourinary: No dysuria, frequency, hematuria or incontinence  Neurological: No headaches, postive lightheadedness, no vertigo, numbness or tremors  Psychiatric: No depression, anxiety, mood swings or difficulty sleeping  Musculoskeletal: No joint pain or swelling; No muscle, back or extremity pain  Skin: No itching, burning, rashes or lesions   Lymph Nodes: No enlarged glands  Endocrine: No heat or cold intolerance; No hair loss   Allergy and Immunologic: No hives or eczema    On Neurological Examination:    The patient is awake, alert, oriented x3.    Extraocular movements were intact.    Pupils equal, round and reactive bilaterally, 3 mm to 2.    Speech was fluent.  Smile symmetric.    Motor:  Bilateral upper and lower 5/5.    Sensory:  Bilateral upper and lower intact to light touch.  No drift.  Finger-to-nose within normal limits.    Follow simple commands      GENERAL Exam: Nontoxic , No Acute Distress   	  HEENT:  normocephalic, atraumatic  		  LUNGS: Clear bilaterally    	  HEART: Normal S1S2   No murmur RRR        	  GI/ ABDOMEN:  Soft  Non tender    EXTREMITIES:   No Edema  No Clubbing  No Cyanosis     MUSCULOSKELETAL: decreased Range of Motion all 4 extremities   	   SKIN: Normal  No Ecchymosis                    LABS:  CBC Full  -  ( 06 Dec 2018 13:51 )  WBC Count : x  Hemoglobin : 8.6 g/dL  Hematocrit : 25.9 %  Platelet Count - Automated : x  Mean Cell Volume : x  Mean Cell Hemoglobin : x  Mean Cell Hemoglobin Concentration : x  Auto Neutrophil # : x  Auto Lymphocyte # : x  Auto Monocyte # : x  Auto Eosinophil # : x  Auto Basophil # : x  Auto Neutrophil % : x  Auto Lymphocyte % : x  Auto Monocyte % : x  Auto Eosinophil % : x  Auto Basophil % : x      12-06    139  |  104  |  29<H>  ----------------------------<  119<H>  3.6   |  28  |  1.30    Ca    7.0<L>      06 Dec 2018 05:34  Phos  2.8     12-06  Mg     1.7     12-06    TPro  4.3<L>  /  Alb  1.8<L>  /  TBili  2.7<H>  /  DBili  x   /  AST  29  /  ALT  21  /  AlkPhos  73  12-06    Hemoglobin A1C:     LIVER FUNCTIONS - ( 06 Dec 2018 05:34 )  Alb: 1.8 g/dL / Pro: 4.3 g/dL / ALK PHOS: 73 U/L / ALT: 21 U/L / AST: 29 U/L / GGT: x           Vitamin B12         RADIOLOGY    ANALYSIS AND PLAN:  A 62-year-old with episode of loss of consciousness.  1.	For episode of loss of consciousness, suspect most likely a syncopal event with a prodrome of feeling lightheaded, blurry vision, warm and sweaty, nausea and vomiting, as per spouse looked pale and becoming in the emergency room hypotensive points towards cerebral hypoperfusion.  There is no clear sign on examination to suggest a new cerebrovascular accident has ensued and there is no clear history to suggest underlying epilepsy.  2.	I would recommend telemetry evaluation, rule out underlying arrhythmia.  3.	Monitor systolic blood pressure.  4.	For history of diabetes, would recommend strict control of the patient's blood sugars.  5.	For history of neuropathy, continue the patient on his gabapentin.  6.	For episode of spasms, no new events   7.	Spoke with spouse, Ada,  Her telephone number is [phone]/2/18 no response  today 12/6/18  8.	monitor H/H   9.	GI work up as needed S/P GI bleed  10.	no new events   11.	monitor oral intake       Greater than 40 minutes spent in direct patient care reviewing  the notes, lab data/ imaging , discussion with multidisciplinary team.

## 2018-12-06 NOTE — PROGRESS NOTE ADULT - PROBLEM SELECTOR PLAN 6
2/2 Cirrhosis -ammonia level improved ? 2/2 GI Bleed ; Pt not confused at baseline MS, no asterixis  -- High Ammonia level 53  on lactulose at home (not on rifaximin at home)   rifaximin 550 BID 2/2 Cirrhosis -ammonia level improved ? 2/2 GI Bleed ; Pt not confused at baseline MS, no asterixis  -- High Ammonia level 53  on lactulose 2x day    rifaximin 550 BID

## 2018-12-06 NOTE — PROVIDER CONTACT NOTE (CRITICAL VALUE NOTIFICATION) - SITUATION
Patient admit for syncope and collapse.  Blood cultures from 12/5 12/5/18 blood cultures: growth in anaerobic bottle gram variable rods 12/5/18 blood culture: growth in the anaerobic  bottle gram negative rods Patient admit for syncope and collapse.  Blood cultures from 12/5/18 blood cultures: growth in anaerobic bottle gram variable rods 12/5/18 blood culture: growth in the anaerobic  bottle gram negative rods Patient admit for syncope and collapse.  Blood cultures from 12/5/18 growth in anaerobic bottle gram variable rods. 12/5/18 blood culture: growth in the anaerobic  bottle gram negative rods.

## 2018-12-06 NOTE — PHYSICAL THERAPY INITIAL EVALUATION ADULT - LEVEL OF INDEPENDENCE: SUPINE/SIT, REHAB EVAL
required increase A due to lightheadedness/dizzy when seated at EOB./maximum assist (25% patients effort) moderate assist (50% patients effort)/maximum assist (25% patients effort)

## 2018-12-06 NOTE — PROGRESS NOTE ADULT - SUBJECTIVE AND OBJECTIVE BOX
SURGERY    SUBJECTIVE:   Patient seen at bedside, no overnight events, no complaints noted and pain is controlled at this time.   States + dark tarry stools yesterday, no BM today  Patient denies any headaches, chest pain, shortness of breath, nausea, vomiting, fever, chills, weakness, dysuria  Patient A+Ox3 in NAD at time of visit.    OBJECTIVE:   T(C): 36.9 (12-06-18 @ 11:37), Max: 38.9 (12-05-18 @ 23:00)  HR: 94 (12-06-18 @ 11:37) (76 - 109)  BP: 107/64 (12-06-18 @ 11:37) (92/46 - 129/59)  RR: 17 (12-06-18 @ 11:37) (10 - 32)  SpO2: 94% (12-06-18 @ 11:37) (91% - 100%)  Wt(kg): --  CAPILLARY BLOOD GLUCOSE      POCT Blood Glucose.: 130 mg/dL (06 Dec 2018 07:42)  POCT Blood Glucose.: 175 mg/dL (05 Dec 2018 22:17)  POCT Blood Glucose.: 191 mg/dL (05 Dec 2018 18:29)  POCT Blood Glucose.: 198 mg/dL (05 Dec 2018 17:00)  POCT Blood Glucose.: 125 mg/dL (05 Dec 2018 12:02)    I&O's Detail    05 Dec 2018 07:01  -  06 Dec 2018 07:00  --------------------------------------------------------  IN:    Oral Fluid: 1380 mL    pantoprazole Infusion: 10 mL    Solution: 250 mL    Solution: 50 mL  Total IN: 1690 mL    OUT:    Voided: 950 mL  Total OUT: 950 mL    Total NET: 740 mL      06 Dec 2018 07:01  -  06 Dec 2018 11:41  --------------------------------------------------------  IN:  Total IN: 0 mL    OUT:    Voided: 400 mL  Total OUT: 400 mL    Total NET: -400 mL          Physical exam:  General: A+O x 3 in NAD  HEENT: PERRLA, EOM intact  Neck: trachea midline  Abdomen: soft non distended, non tympanic, BS x 4, no guarding noted  Extremities: no edema noted, warm,  no calf tenderness     MEDICATIONS  (STANDING):  atorvastatin 10 milliGRAM(s) Oral at bedtime  dextrose 5%. 1000 milliLiter(s) (50 mL/Hr) IV Continuous <Continuous>  dextrose 50% Injectable 12.5 Gram(s) IV Push once  dextrose 50% Injectable 25 Gram(s) IV Push once  dextrose 50% Injectable 25 Gram(s) IV Push once  gabapentin 200 milliGRAM(s) Oral four times a day  insulin glargine Injectable (LANTUS) 50 Unit(s) SubCutaneous at bedtime  insulin lispro (HumaLOG) corrective regimen sliding scale   SubCutaneous Before meals and at bedtime  lactulose Syrup 15 Gram(s) Oral two times a day  pantoprazole    Tablet 40 milliGRAM(s) Oral two times a day  rifaximin 550 milliGRAM(s) Oral two times a day  sucralfate 1 Gram(s) Oral every 6 hours  ursodiol Capsule 300 milliGRAM(s) Oral three times a day    MEDICATIONS  (PRN):  acetaminophen   Tablet .. 650 milliGRAM(s) Oral every 6 hours PRN Temp greater or equal to 38C (100.4F)  dextrose 40% Gel 15 Gram(s) Oral once PRN Blood Glucose LESS THAN 70 milliGRAM(s)/deciliter  glucagon  Injectable 1 milliGRAM(s) IntraMuscular once PRN Glucose LESS THAN 70 milligrams/deciliter  lidocaine 2% Gel 1 Application(s) Topical every 3 hours PRN pain      LABS:                        7.4    8.63  )-----------( 88       ( 06 Dec 2018 05:34 )             22.0     12-06    139  |  104  |  29<H>  ----------------------------<  119<H>  3.6   |  28  |  1.30    Ca    7.0<L>      06 Dec 2018 05:34  Phos  2.8     12-06  Mg     1.7     12-06    TPro  4.3<L>  /  Alb  1.8<L>  /  TBili  2.7<H>  /  DBili  x   /  AST  29  /  ALT  21  /  AlkPhos  73  12-06          RADIOLOGY & ADDITIONAL STUDIES:    Assessment  62 year old male with resolving upper GI bleed, hemoglobin stable     Plan  - continue diet  - continue IV hydration due to rise in creatnine  - avoid nephrotoxic medications  - OOB to chair  - consider transfusing to hemoglobin of 8

## 2018-12-06 NOTE — PHYSICAL THERAPY INITIAL EVALUATION ADULT - PATIENT/FAMILY AGREES WITH PLAN
discussed rehab vs HCPT due to initial assessment and increased assistance due to dizziness when seated at EOB and dec functional ability. plan TBD/yes Discussed options of [facility] vs. HCPT. Pt prefers to go home, plan TBD/yes

## 2018-12-06 NOTE — PHYSICAL THERAPY INITIAL EVALUATION ADULT - DID THE PATIENT HAVE SURGERY?
n/a n/a/Chest X-ray: (-) infiltrate; CT Angio Chest: Suboptimal arterial phase changes; CT C/spine: multilevel degenerative disc disease & at C6-C7 osteophytes; CT head: (-) acute pathology

## 2018-12-06 NOTE — CONSULT NOTE ADULT - REASON FOR ADMISSION
vomiting and near syncope

## 2018-12-06 NOTE — PHYSICAL THERAPY INITIAL EVALUATION ADULT - MANUAL MUSCLE TESTING RESULTS, REHAB EVAL
UE: grossly 3+/5, L hip flex/ext ankle DF/PF 3+/5, noted hip flex and knee ext 3/5/grossly assessed due to grossly assessed due to/UE: grossly 3+/5, L hip flex/ext ankle DF/PF 3+/5, noted hip flex and knee ext 3/5, ankle DF/PF 3+/5

## 2018-12-06 NOTE — PROGRESS NOTE ADULT - PROBLEM SELECTOR PLAN 2
Anemia & Thrombocytopenia  Ac Blood loss Anemia, 2/2 Bleeding DU  S/P EGD x 2 by GI  -Pt had Multiple pRBC transfusions , FFP & Platelets transfusions.  Dr Duran's group to follow pt Anemia & Thrombocytopenia  Ac Blood loss Anemia, 2/2 Bleeding DU  S/P EGD x 2 by GI  -Pt had Multiple pRBC transfusions , FFP & Platelets transfusions.  - drop in Hbg from 7.8 -->7.4 12/6. As per Dr. David dorsey - consider transfusing to Hgb of 8  Dr Duran's group to follow pt Anemia & Thrombocytopenia  Ac Blood loss Anemia, 2/2 Bleeding DU  S/P EGD x 2 by GI  -Pt had Multiple pRBC transfusions , FFP & Platelets transfusions.  - As per Dr. Hernandez recs - consider transfusing to Hgb of 8  Dr Duran's group to follow pt - Downgraded from ICU to [facility]  - Bleeding Duodenal ulcer on Emergent 2 nd EGD on 12/3/ in ICU s/p clips x2, epi injection, and cautery. Pt had EGD done on Weekend  - pt had multiple bloody BM 2/2 rapid upper GI bleed   - Acute Blood loss Anemia with Bleeding DU - H/H Low stable  - hx of PUD, Esophagitis, Gastritis, non bleeding ulcers on last EGD10/18  - s/p 3 units FFP, 7 units pRBC, 1 platelets & Vit K 5 mg x 1 dose given 12/3  - GI ([facility]) following  - Dr Hernandez recs: -  If further bleeding ---> plan to transfer to [facility] for IR / surgical intervention  - c/w , Protonix 40 mg 2x day Carafate q 6 hrs   - Diet advanced to Regular carbohydrate diet w/ evening snack  - Serial CBC, -Coagulapathy with liver disease

## 2018-12-06 NOTE — PHYSICAL THERAPY INITIAL EVALUATION ADULT - PHYSICAL ASSIST/NONPHYSICAL ASSIST: SIT/SUPINE, REHAB EVAL
1 person assist verbal cues/2 person assist/nonverbal cues (demo/gestures)/set-up required/c/o dizziness, pale color skin. Placed back in bed for pt safety

## 2018-12-06 NOTE — PROGRESS NOTE ADULT - ATTENDING COMMENTS
62M PMH DM2, neuropathy, HTN, HLD, JEAN, hepatic encephalopathy, & PUD who presents with acute blood loss anemia due to upper GI bleed, s/p EGD x2, found to have large bleeding duodenal ulcer s/p clips x2, epi injection, and cautery. S/p 10 units prbc's, 2 plts, and 4 FFP. Also with MISA, lactic acidosis, uncontrolled hyperglycemia, and coagulopathy due to underlying JEAN. Course complicated by fever 12/6.    - Hepatic encephalopathy, continue rifaximin and lactulose, not currently encephalopathic  - HD stable, resolved sinus tachycardia. Hold antiplatelets and a/c  - Stable respiratory status at present, on room air with SpO2>90%. Lung ultrasound with atelectasis at the bases bilaterally. out of bed to chair. Incentive spirometry. PT eval  - Continue PPI bid + sucralfate. Continue diabetic DASH diet. F/up GI. Hold a/c and antiplatelets  - Stable H/H, repeat HGB this afternoon 8.6. Continue to monitor for bleeding. Now with brown stool  - SCD's for DVT ppx. No evidence of DVTs on bedside leg dopplers. Consider heparin sq tomorrow if H/H remains stable with no bleeding  - Resolved lactic acidosis  - Mildly worsened kidney function today. S/p dose of losartan yesterday. Avoid ACE/ARB's. Trend kidney function and lytes  - Observe off abx  - Continue lantus 50 qhs, continue insulin coverage scale. A1c 8.8  - Discussed with patient at bedside, full code  - Stable for floors 62M PMH DM2, neuropathy, HTN, HLD, JEAN, hepatic encephalopathy, & PUD who presents with acute blood loss anemia due to upper GI bleed, s/p EGD x2, found to have large bleeding duodenal ulcer s/p clips x2, epi injection, and cautery. S/p 10 units prbc's, 2 plts, and 4 FFP. Also with MISA, lactic acidosis, uncontrolled hyperglycemia, and coagulopathy due to underlying JEAN. Course complicated by fever 12/6.    - Hepatic encephalopathy, continue rifaximin and lactulose, not currently encephalopathic  - HD stable, resolved sinus tachycardia. Hold antiplatelets and a/c  - Stable respiratory status at present, on room air with SpO2>90%. Lung ultrasound with atelectasis at the bases bilaterally. out of bed to chair. Incentive spirometry. PT eval  - Continue PPI bid + sucralfate. Continue diabetic DASH diet. F/up GI. Hold a/c and antiplatelets  - Stable H/H, repeat HGB this afternoon 8.6. Continue to monitor for bleeding. Now with brown stool  - SCD's for DVT ppx. No evidence of DVTs on bedside leg dopplers. Consider heparin sq tomorrow if H/H remains stable with no bleeding  - Resolved lactic acidosis  - Mildly worsened kidney function today. S/p dose of losartan yesterday. Avoid ACE/ARB's. Trend kidney function and lytes  - Fever of unclear etiology. Likely due to atelectasis. No evidence of pneumonia on US/CXR. F/up blood cultures. Observe off abx. No evidence of DVT on bedside dopplers. Monitor fever curve  - Continue lantus 50 qhs, continue insulin coverage scale. A1c 8.8  - Discussed with patient at bedside, full code  - Stable for floors

## 2018-12-06 NOTE — PROGRESS NOTE ADULT - PROBLEM SELECTOR PLAN 1
ugib, resolved  sp egd- large du with clot, sp epi w hemostasis  sp repeat egd, 4 clips placed, injected w epi and cauterized   no s/s active gib  serial cbc, transfuse prn  hold ac asa nsaids  cont ppi and carafate  surgery following  diet as tolerated  monitor abd exam  if rebleeds may need surgery/IR  cont icu monitoring ugib  sp egd- large du with clot, sp epi w hemostasis  sp repeat egd, 4 clips placed, injected w epi and cauterized   no s/s active gib  serial cbc, transfuse prn  hold ac asa nsaids  cont ppi and carafate  surgery following  diet as tolerated  monitor abd exam  if rebleeds may need surgery/IR  cont icu monitoring

## 2018-12-06 NOTE — PHYSICAL THERAPY INITIAL EVALUATION ADULT - ADDITIONAL COMMENTS
Pt lives at home in a private house /c 3 LITA and 2 rails but only 1 is reachable. Pt does not negotiate stairs indoors, pt is on main level. Pt was independent /c all functional mobility and ADLs prior to admission. Pt drives and does have a wooden single axis cane but does not use. Pt wife is home during daytime & children are in school.

## 2018-12-06 NOTE — PHYSICAL THERAPY INITIAL EVALUATION ADULT - CRITERIA FOR SKILLED THERAPEUTIC INTERVENTIONS
rehab potential/Rehab vs HCPT/impairments found/therapy frequency/functional limitations in following categories/anticipated discharge recommendation

## 2018-12-07 LAB
ALBUMIN SERPL ELPH-MCNC: 2 G/DL — LOW (ref 3.3–5)
ALP SERPL-CCNC: 87 U/L — SIGNIFICANT CHANGE UP (ref 40–120)
ALT FLD-CCNC: 23 U/L — SIGNIFICANT CHANGE UP (ref 12–78)
ANION GAP SERPL CALC-SCNC: 6 MMOL/L — SIGNIFICANT CHANGE UP (ref 5–17)
APTT BLD: 26.8 SEC — LOW (ref 27.5–36.3)
AST SERPL-CCNC: 34 U/L — SIGNIFICANT CHANGE UP (ref 15–37)
BASOPHILS # BLD AUTO: 0.03 K/UL — SIGNIFICANT CHANGE UP (ref 0–0.2)
BASOPHILS NFR BLD AUTO: 0.3 % — SIGNIFICANT CHANGE UP (ref 0–2)
BILIRUB SERPL-MCNC: 2.9 MG/DL — HIGH (ref 0.2–1.2)
BUN SERPL-MCNC: 28 MG/DL — HIGH (ref 7–23)
CALCIUM SERPL-MCNC: 7 MG/DL — LOW (ref 8.5–10.1)
CHLORIDE SERPL-SCNC: 100 MMOL/L — SIGNIFICANT CHANGE UP (ref 96–108)
CO2 SERPL-SCNC: 28 MMOL/L — SIGNIFICANT CHANGE UP (ref 22–31)
CREAT SERPL-MCNC: 1.3 MG/DL — SIGNIFICANT CHANGE UP (ref 0.5–1.3)
EOSINOPHIL # BLD AUTO: 0.2 K/UL — SIGNIFICANT CHANGE UP (ref 0–0.5)
EOSINOPHIL NFR BLD AUTO: 2.2 % — SIGNIFICANT CHANGE UP (ref 0–6)
GLUCOSE SERPL-MCNC: 80 MG/DL — SIGNIFICANT CHANGE UP (ref 70–99)
HCT VFR BLD CALC: 23.5 % — LOW (ref 39–50)
HCT VFR BLD CALC: 24.5 % — LOW (ref 39–50)
HGB BLD-MCNC: 7.9 G/DL — LOW (ref 13–17)
HGB BLD-MCNC: 8.1 G/DL — LOW (ref 13–17)
IMM GRANULOCYTES NFR BLD AUTO: 1.3 % — SIGNIFICANT CHANGE UP (ref 0–1.5)
INR BLD: 1.49 RATIO — HIGH (ref 0.88–1.16)
LYMPHOCYTES # BLD AUTO: 1.55 K/UL — SIGNIFICANT CHANGE UP (ref 1–3.3)
LYMPHOCYTES # BLD AUTO: 17 % — SIGNIFICANT CHANGE UP (ref 13–44)
MCHC RBC-ENTMCNC: 30.3 PG — SIGNIFICANT CHANGE UP (ref 27–34)
MCHC RBC-ENTMCNC: 33.6 GM/DL — SIGNIFICANT CHANGE UP (ref 32–36)
MCV RBC AUTO: 90 FL — SIGNIFICANT CHANGE UP (ref 80–100)
MONOCYTES # BLD AUTO: 0.74 K/UL — SIGNIFICANT CHANGE UP (ref 0–0.9)
MONOCYTES NFR BLD AUTO: 8.1 % — SIGNIFICANT CHANGE UP (ref 2–14)
NEUTROPHILS # BLD AUTO: 6.47 K/UL — SIGNIFICANT CHANGE UP (ref 1.8–7.4)
NEUTROPHILS NFR BLD AUTO: 71.1 % — SIGNIFICANT CHANGE UP (ref 43–77)
PLATELET # BLD AUTO: 112 K/UL — LOW (ref 150–400)
POTASSIUM SERPL-MCNC: 3.2 MMOL/L — LOW (ref 3.5–5.3)
POTASSIUM SERPL-SCNC: 3.2 MMOL/L — LOW (ref 3.5–5.3)
PROT SERPL-MCNC: 4.7 G/DL — LOW (ref 6–8.3)
PROTHROM AB SERPL-ACNC: 17 SEC — HIGH (ref 10–12.9)
RBC # BLD: 2.61 M/UL — LOW (ref 4.2–5.8)
RBC # FLD: 18.4 % — HIGH (ref 10.3–14.5)
SODIUM SERPL-SCNC: 134 MMOL/L — LOW (ref 135–145)
WBC # BLD: 9.11 K/UL — SIGNIFICANT CHANGE UP (ref 3.8–10.5)
WBC # FLD AUTO: 9.11 K/UL — SIGNIFICANT CHANGE UP (ref 3.8–10.5)

## 2018-12-07 RX ORDER — POTASSIUM CHLORIDE 20 MEQ
40 PACKET (EA) ORAL EVERY 4 HOURS
Qty: 0 | Refills: 0 | Status: COMPLETED | OUTPATIENT
Start: 2018-12-07 | End: 2018-12-07

## 2018-12-07 RX ORDER — SIMETHICONE 80 MG/1
80 TABLET, CHEWABLE ORAL EVERY 6 HOURS
Qty: 0 | Refills: 0 | Status: DISCONTINUED | OUTPATIENT
Start: 2018-12-07 | End: 2018-12-11

## 2018-12-07 RX ADMIN — ATORVASTATIN CALCIUM 10 MILLIGRAM(S): 80 TABLET, FILM COATED ORAL at 22:06

## 2018-12-07 RX ADMIN — Medication 1 GRAM(S): at 17:16

## 2018-12-07 RX ADMIN — LACTULOSE 15 GRAM(S): 10 SOLUTION ORAL at 06:07

## 2018-12-07 RX ADMIN — PANTOPRAZOLE SODIUM 40 MILLIGRAM(S): 20 TABLET, DELAYED RELEASE ORAL at 06:06

## 2018-12-07 RX ADMIN — PIPERACILLIN AND TAZOBACTAM 25 GRAM(S): 4; .5 INJECTION, POWDER, LYOPHILIZED, FOR SOLUTION INTRAVENOUS at 22:06

## 2018-12-07 RX ADMIN — LACTULOSE 15 GRAM(S): 10 SOLUTION ORAL at 17:16

## 2018-12-07 RX ADMIN — GABAPENTIN 200 MILLIGRAM(S): 400 CAPSULE ORAL at 12:38

## 2018-12-07 RX ADMIN — URSODIOL 300 MILLIGRAM(S): 250 TABLET, FILM COATED ORAL at 06:07

## 2018-12-07 RX ADMIN — PANTOPRAZOLE SODIUM 40 MILLIGRAM(S): 20 TABLET, DELAYED RELEASE ORAL at 17:16

## 2018-12-07 RX ADMIN — INSULIN GLARGINE 50 UNIT(S): 100 INJECTION, SOLUTION SUBCUTANEOUS at 22:06

## 2018-12-07 RX ADMIN — Medication 40 MILLIEQUIVALENT(S): at 15:19

## 2018-12-07 RX ADMIN — Medication 1 GRAM(S): at 06:07

## 2018-12-07 RX ADMIN — GABAPENTIN 200 MILLIGRAM(S): 400 CAPSULE ORAL at 06:07

## 2018-12-07 RX ADMIN — URSODIOL 300 MILLIGRAM(S): 250 TABLET, FILM COATED ORAL at 22:06

## 2018-12-07 RX ADMIN — PIPERACILLIN AND TAZOBACTAM 25 GRAM(S): 4; .5 INJECTION, POWDER, LYOPHILIZED, FOR SOLUTION INTRAVENOUS at 15:19

## 2018-12-07 RX ADMIN — GABAPENTIN 200 MILLIGRAM(S): 400 CAPSULE ORAL at 17:16

## 2018-12-07 RX ADMIN — Medication 40 MILLIEQUIVALENT(S): at 10:36

## 2018-12-07 RX ADMIN — Medication 2: at 17:17

## 2018-12-07 RX ADMIN — URSODIOL 300 MILLIGRAM(S): 250 TABLET, FILM COATED ORAL at 15:19

## 2018-12-07 RX ADMIN — PIPERACILLIN AND TAZOBACTAM 25 GRAM(S): 4; .5 INJECTION, POWDER, LYOPHILIZED, FOR SOLUTION INTRAVENOUS at 06:07

## 2018-12-07 RX ADMIN — Medication 1 GRAM(S): at 12:38

## 2018-12-07 NOTE — PROGRESS NOTE ADULT - PROBLEM SELECTOR PLAN 2
- Downgraded from ICU to [facility]  - Bleeding Duodenal ulcer on Emergent 2 nd EGD on 12/3/ in ICU s/p clips x2, epi injection, and cautery. Pt had EGD done on Weekend  - pt had multiple bloody BM 2/2 rapid upper GI bleed   - Acute Blood loss Anemia with Bleeding DU - H/H Low stable. Hgb at 7.9 & 8.1 today  - hx of PUD, Esophagitis, Gastritis, non bleeding ulcers on last EGD10/18  - s/p 3 units FFP, 7 units pRBC, 1 platelets & Vit K 5 mg x 1 dose given 12/3  - GI ([facility]) following  - Dr Hernandez recs: -  If further bleeding ---> plan to transfer to [facility] for IR / surgical intervention  - c/w , Protonix 40 mg 2x day Carafate q 6 hrs   - Diet advanced to Regular carbohydrate diet w/ evening snack  - Serial CBC, -Coagulapathy with liver disease - On Tele  -S/P  Bleeding Duodenal ulcer on Emergent 2 nd EGD on 12/3/ in ICU s/p clips x2, epi injection, and cautery. Pt had EGD done on Weekend  - pt had multiple bloody BM 2/2 rapid upper GI bleed on admission-  - hx of PUD, Esophagitis, Gastritis, non bleeding ulcers on last EGD10/18  - s/p 3 units FFP, 7 units pRBC, 1 platelets & Vit K 5 mg x 1 dose given 12/3  - GI ([facility]) following  - Dr Hernandez recs: -  If further bleeding ---> plan to transfer to [facility] for IR / surgical intervention  - c/w , Protonix 40 mg 2x day Carafate q 6 hrs   - Diet advanced to Regular carbohydrate diet w/ evening snack  - Serial CBC, -Coagulapathy with liver disease
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
## 2018-12-07 NOTE — PROGRESS NOTE ADULT - PROBLEM SELECTOR PLAN 1
ugib  sp egd- large du with clot, sp epi w hemostasis  sp repeat egd, 4 clips placed, injected w epi and cauterized   no s/s active gib  serial cbc, transfuse prn  hold ac asa nsaids  cont ppi and carafate  surgery following  diet as tolerated  monitor abd exam  if rebleeds may need surgery/IR  will follow

## 2018-12-07 NOTE — PROGRESS NOTE ADULT - PROBLEM SELECTOR PLAN 8
- hypotensive improved    - hold BP meds for now, Low BP last night  - on irbesartan 300mg QD at home, - hypotensive improved    - hold BP meds for now, Low BP last night  - on irbesartan 300mg QD at home  patient states that his baseline BPS's are in the 100-110 - hypotensive improved    - hold BP meds for now,   - on irbesartan 300mg QD at home  patient states that his baseline BPS's are in the 100-110

## 2018-12-07 NOTE — PROGRESS NOTE ADULT - SUBJECTIVE AND OBJECTIVE BOX
JESSICA MARTIN is a 62yMale , patient examined and chart reviewed.      INTERVAL HPI/ OVERNIGHT EVENTS:  Feeling better.  Blood cultures with Ecoli.  Low grade temps overnight.    Past Medical History--  PAST MEDICAL & SURGICAL HISTORY:  GERD with esophagitis: Gastritis &amp; Non Bleeding Ulcers  Hepatic encephalopathy  Obesity  Fatty liver disease, nonalcoholic  Renal stones: 25 years ago  Hypertension  Neuropathy  Hypercholesteremia  Diabetes  S/P cholecystectomy      For details regarding the patient's social history, family history, and other miscellaneous elements, please refer the initial infectious diseases consultation and/or the admitting history and physical examination for this admission.      ROS:  CONSTITUTIONAL:  + fever or chills, feels well, good appetite  EYES:  Negative  blurry vision or double vision  CARDIOVASCULAR:  Negative for chest pain or palpitations  RESPIRATORY:  Negative for cough, wheezing, or SOB   GASTROINTESTINAL:  Negative for nausea, vomiting, diarrhea, constipation, or abdominal pain  GENITOURINARY:  Negative frequency, urgency , dysuria or hematuria   NEUROLOGIC:  No headache, confusion, dizziness, lightheadedness  All other systems were reviewed and are negative     ALLERGIES:  codeine (Anaphylaxis)    Current inpatient medications :    ANTIBIOTICS/RELEVANT:  piperacillin/tazobactam IVPB. 3.375 Gram(s) IV Intermittent every 8 hours  rifaximin 550 milliGRAM(s) Oral two times a day      acetaminophen   Tablet .. 650 milliGRAM(s) Oral every 6 hours PRN  aluminum hydroxide/magnesium hydroxide/simethicone Suspension 30 milliLiter(s) Oral every 6 hours PRN  atorvastatin 10 milliGRAM(s) Oral at bedtime  dextrose 40% Gel 15 Gram(s) Oral once PRN  dextrose 5%. 1000 milliLiter(s) IV Continuous <Continuous>  dextrose 50% Injectable 12.5 Gram(s) IV Push once  dextrose 50% Injectable 25 Gram(s) IV Push once  dextrose 50% Injectable 25 Gram(s) IV Push once  gabapentin 200 milliGRAM(s) Oral four times a day  glucagon  Injectable 1 milliGRAM(s) IntraMuscular once PRN  insulin glargine Injectable (LANTUS) 50 Unit(s) SubCutaneous at bedtime  insulin lispro (HumaLOG) corrective regimen sliding scale   SubCutaneous three times a day before meals  insulin lispro (HumaLOG) corrective regimen sliding scale   SubCutaneous at bedtime  lactulose Syrup 15 Gram(s) Oral two times a day  lidocaine 2% Gel 1 Application(s) Topical every 3 hours PRN  pantoprazole    Tablet 40 milliGRAM(s) Oral two times a day  potassium chloride    Tablet ER 40 milliEquivalent(s) Oral every 4 hours  simethicone 80 milliGRAM(s) Chew every 6 hours PRN  sucralfate 1 Gram(s) Oral every 6 hours  ursodiol Capsule 300 milliGRAM(s) Oral three times a day      Objective:     @ 07:01  -   @ 07:00  --------------------------------------------------------  IN: 0 mL / OUT: 900 mL / NET: -900 mL     @ 07:01  -   @ 14:41  --------------------------------------------------------  IN: 400 mL / OUT: 0 mL / NET: 400 mL      T(C): 36.7 (18 @ 11:50), Max: 38.1 (18 @ 20:32)  HR: 75 (18 @ 11:50) (75 - 102)  BP: 115/67 (18 @ 11:50) (100/55 - 127/65)  RR: 17 (18 @ 11:50) (17 - 18)  SpO2: 96% (18 @ 11:50) (93% - 99%)  Wt(kg): --      Physical Exam:  GEN: NAD, pleasant  HEENT: normocephalic and atraumatic. EOMI. GREYSON. Moist mucosa. Clear Posterior pharynx.  NECK: Supple. No carotid bruits.  No lymphadenopathy or thyromegaly.  LUNGS: Clear to auscultation.  HEART: Regular rate and rhythm without murmur.  ABDOMEN: Soft, nontender, and nondistended.  Positive bowel sounds.  No hepatosplenomegaly was noted.  EXTREMITIES: Without any cyanosis, clubbing, rash, lesions or edema.  NEUROLOGIC: A & O x3, No focal neurological deficits   SKIN: No ulceration or induration present.      LABS:                        8.1    x     )-----------( x        ( 07 Dec 2018 14:24 )             24.5       12-    134<L>  |  100  |  28<H>  ----------------------------<  80  3.2<L>   |  28  |  1.30    Ca    7.0<L>      07 Dec 2018 05:22  Phos  2.8     12  Mg     1.7         TPro  4.7<L>  /  Alb  2.0<L>  /  TBili  2.9<H>  /  DBili  x   /  AST  34  /  ALT  23  /  AlkPhos  87        PT/INR - ( 07 Dec 2018 05:22 )   PT: 17.0 sec;   INR: 1.49 ratio         PTT - ( 07 Dec 2018 05:22 )  PTT:26.8 sec  Urinalysis Basic - ( 06 Dec 2018 22:55 )    Color: Yellow / Appearance: Clear / S.010 / pH: x  Gluc: x / Ketone: Negative  / Bili: Small / Urobili: 4   Blood: x / Protein: 25 mg/dL / Nitrite: Negative   Leuk Esterase: Negative / RBC: 3-5 /HPF / WBC 3-5   Sq Epi: x / Non Sq Epi: Occasional / Bacteria: x      MICROBIOLOGY:    Culture - Blood (collected 06 Dec 2018 07:42)  Source: .Blood Blood-Peripheral  Gram Stain (06 Dec 2018 19:53):    Growth in anaerobic bottle: Gram Variable Rods  Preliminary Report (06 Dec 2018 19:53):    Growth in anaerobic bottle: Gram Variable Rods    "Due to technical problems, Proteus sp. will Not be reported as part of    the BCID panel until further notice"    ***Blood Panel PCR results on this specimen are available    approximately 3 hours after the Gram stain result.***    Gram stain, PCR, and/or culture results may not always    correspond due to difference in methodologies.    ************************************************************    This PCR assay was performed using [email or web address].    The following targets are tested for: Enterococcus,    vancomycin resistant enterococci, Listeria monocytogenes,    coagulase negative staphylococci, S. aureus,    methicillin resistant S. aureus, Streptococcus agalactiae    (Group B), S. pneumoniae, S. pyogenes (Group A),    Acinetobacter baumannii, Enterobacter cloacae, E. coli,    Klebsiella oxytoca, K. pneumoniae, Proteus sp.,    Serratia marcescens, Haemophilus influenzae,    Neisseria meningitidis, Pseudomonas aeruginosa, Candida    albicans, C. glabrata, C krusei, C parapsilosis,    C. tropicalis and the KPC resistance gene.  Organism: Blood Culture PCR (06 Dec 2018 23:44)  Organism: Blood Culture PCR (06 Dec 2018 23:44)      -  Escherichia coli: Detec      Method Type: PCR    Culture - Blood (collected 06 Dec 2018 07:42)  Source: .Blood Blood-Peripheral  Gram Stain (06 Dec 2018 20:52):    Growth in anaerobic bottle: Gram Negative Rods    Growth in aerobic bottle: Gram Negative Rods  Preliminary Report (06 Dec 2018 20:53):    Growth in anaerobic bottle: Gram Negative Rods    Growth in aerobic bottle: Gram Negative Rods      RADIOLOGY & ADDITIONAL STUDIES:    EXAM:  CT ANGIO ABD PELV (W)AW IC                            PROCEDURE DATE:  2018          INTERPRETATION:   Cirrhosis, coagulopathic. Evaluate for GI bleed.    CTA abdomen and pelvis prior CT abdomen 10/17/2018  190 cc Omnipaque 350 injected intravenously  Axial images augmented by coronal sagittal reformats 3-D MIP images.    Limited. Suboptimal arterial phase imaging secondary to poor bolus.  No definite evidence of active arterial extravasation. No pooling noted   on the venous phase.  Multiple upper abdominal and retroperitoneal varices similar to prior.  Status post cholecystectomy. Stable prominence of the common duct   attributed postcholecystectomy change. Liver shrunken suggestive of   cirrhosis. Pancreas spleen unremarkable.  No renal abnormalities.  No ascites.  Colonic diverticula without diverticulitis or other acute bowel   inflammation. No bowel obstruction.  Normal size prostate with calcination. Bladder not remarkable.  No acute skeletal abnormality.    Impression:    Limited. Suboptimal arterial phase.  No obvious active gastrointestinal hemorrhage. If warranted scintigraphy   might be considered for additional evaluation.  Additional findings as discussed          EXAM:  XR CHEST PORTABLE URGENT 1V                            PROCEDURE DATE:  2018          INTERPRETATION:      INDICATION: Fever syncopal episode collapse    Single frontal view of the chest compared to prior study of 2018.    FINDINGS/  IMPRESSION:       No acute consolidation. There is no pleural effusion. There is no   pneumothorax.  The cardiac silhouette is within normal limits.  There is   degenerative changes.      Assessment :   Pt is a 63 YO M with PMHx obesity, DM 2, HTN, HLD, nephrolithiasis, neuropathy, poss JEAN  admitted with GIB sp egd now with new fevers with Ecoli bacteremia  Source could be sec UTI although UA neg or GI   CXR clear  WBC wnl    Plan :   Cont Zosyn  Fu cultures  Trend temps and wbc    D/w Dr RAMY Arreguin    Continue with present regiment.  Appropriate use of antibiotics and adverse effects reviewed.    I have discussed the above plan of care with patient/ family in detail. They expressed understanding of the the treatment plan . Risks, benefits and alternatives discussed in detail. I have asked if they have any questions or concerns and appropriately addressed them to the best of my ability .      35 minutes reviewing notes, labs data/ imaging , discussion with multidisciplinary team.    Thank you for allowing me to participate in care of your patient .        Tanya Rios MD  Infectious Disease  [phone]

## 2018-12-07 NOTE — PROGRESS NOTE ADULT - SUBJECTIVE AND OBJECTIVE BOX
Patient is a 62y old  Male who presents with a chief complaint of vomiting and near syncope (07 Dec 2018 11:23)      INTERVAL HPI:      OVERNIGHT EVENTS:    Home Medications:  Bacid (LAC) oral tablet: 1 tab(s) orally 2 times a day for  2weeks (2018 19:41)  HumaLOG 100 units/mL subcutaneous solution: 20 unit(s) subcutaneous 2 times a day(breakfast and lunch) (2018 19:41)  HumaLOG 100 units/mL subcutaneous solution: 30 unit(s) subcutaneous once a day(@dinner) (2018 19:41)  irbesartan 300 mg oral tablet: 1 tab(s) orally once a day (2018 19:41)  lactulose 10 g/15 mL oral syrup: 15 milliliter(s) orally 3 times a day (2018 19:41)  Lasix 20 mg oral tablet: 1 tab(s) orally once a day (2018 19:41)  pantoprazole 40 mg oral delayed release tablet: 1 tab(s) orally once a day (2018 19:41)  phytonadione 100 mcg oral tablet: 1 tab(s) orally once a day (2018 19:41)  potassium chloride 10 mEq oral tablet, extended release: 1 tab(s) orally once a day (2018 19:41)  pravastatin 40 mg oral tablet: 1 tab(s) orally once a day (2018 19:41)  Vitamin D3 2000 intl units oral tablet: 1 tab(s) orally once a day (2018 19:41)      MEDICATIONS  (STANDING):  atorvastatin 10 milliGRAM(s) Oral at bedtime  dextrose 5%. 1000 milliLiter(s) (50 mL/Hr) IV Continuous <Continuous>  dextrose 50% Injectable 12.5 Gram(s) IV Push once  dextrose 50% Injectable 25 Gram(s) IV Push once  dextrose 50% Injectable 25 Gram(s) IV Push once  gabapentin 200 milliGRAM(s) Oral four times a day  insulin glargine Injectable (LANTUS) 50 Unit(s) SubCutaneous at bedtime  insulin lispro (HumaLOG) corrective regimen sliding scale   SubCutaneous three times a day before meals  insulin lispro (HumaLOG) corrective regimen sliding scale   SubCutaneous at bedtime  lactulose Syrup 15 Gram(s) Oral two times a day  pantoprazole    Tablet 40 milliGRAM(s) Oral two times a day  piperacillin/tazobactam IVPB. 3.375 Gram(s) IV Intermittent every 8 hours  potassium chloride    Tablet ER 40 milliEquivalent(s) Oral every 4 hours  rifaximin 550 milliGRAM(s) Oral two times a day  sucralfate 1 Gram(s) Oral every 6 hours  ursodiol Capsule 300 milliGRAM(s) Oral three times a day    MEDICATIONS  (PRN):  acetaminophen   Tablet .. 650 milliGRAM(s) Oral every 6 hours PRN Temp greater or equal to 38C (100.4F)  dextrose 40% Gel 15 Gram(s) Oral once PRN Blood Glucose LESS THAN 70 milliGRAM(s)/deciliter  glucagon  Injectable 1 milliGRAM(s) IntraMuscular once PRN Glucose LESS THAN 70 milligrams/deciliter  lidocaine 2% Gel 1 Application(s) Topical every 3 hours PRN pain      Allergies    codeine (Anaphylaxis)    Intolerances        REVIEW OF SYSTEMS:  CONSTITUTIONAL: No fever, No chills, No fatigue, No myalgia, No Body ache, No Weakness  EYES: No eye pain,  No visual disturbances, No discharge, NO Redness  ENMT:  No ear pain, No nose bleed, No vertigo; No sinus or throat pain, No Congestion  NECK: No pain, No stiffness  RESPIRATORY: No cough, wheezing, No  hemoptysis, No shortness of breath  CARDIOVASCULAR: No chest pain, palpitations  GASTROINTESTINAL: No abdominal or epigastric pain. No nausea, No vomiting; No diarrhea or constipation. [  ] BM  GENITOURINARY: No dysuria, No frequency, No urgency, No hematuria, or incontinence  NEUROLOGICAL: No headaches, No dizziness, No numbness, No tingling, No tremors, No weakness  EXT: No Swelling, No Pain, No Edema  SKIN:  [  ] No itching, burning, rashes, or lesions   MUSCULOSKELETAL: No joint pain or swelling; No muscle pain, No back pain, No extremity pain  PSYCHIATRIC: No depression, anxiety, mood swings or difficulty sleeping at night  PAIN SCALE: [  ] None  [  ] Other-  ROS Unable to obtain due to - [  ] Dementia  [  ] Lethargy  [  ] Sedated [  ] non verbal  REST OF REVIEW Of SYSTEM - [  ] Normal     Vital Signs Last 24 Hrs  T(C): 36.7 (07 Dec 2018 11:50), Max: 38.1 (06 Dec 2018 20:32)  T(F): 98 (07 Dec 2018 11:50), Max: 100.5 (06 Dec 2018 20:32)  HR: 75 (07 Dec 2018 11:50) (75 - 102)  BP: 115/67 (07 Dec 2018 11:50) (100/55 - 127/65)  BP(mean): --  RR: 17 (07 Dec 2018 11:50) (17 - 18)  SpO2: 96% (07 Dec 2018 11:50) (93% - 99%)  Finger Stick         @ 07:  -   @ 07:00  --------------------------------------------------------  IN: 0 mL / OUT: 900 mL / NET: -900 mL     @ 07:  -   @ 13:32  --------------------------------------------------------  IN: 400 mL / OUT: 0 mL / NET: 400 mL        PHYSICAL EXAM:  GENERAL:  [  ] NAD , [  ] well appearing, [  ] Agitated, [  ] Drowsy,  [  ] Lethargy, [  ] confused   HEAD:  [  ] Normal, [  ] Other  EYES:  [  ] EOMI, [  ] PERRLA, [  ] conjunctiva and sclera clear normal, [  ] Other,  [  ] Pallor,[  ] Discharge  ENMT:  [  ] Normal, [  ] Moist mucous membranes, [  ] Good dentition, [  ] No Thrush  NECK:  [  ] Supple, [  ] No JVD, [  ] Normal thyroid, [  ] Lymphadenopathy [  ] Other  CHEST/LUNG:  [  ] Clear to auscultation bilaterally, [  ] Breath Sounds equal OR Decrease,  [  ] No rales, [  ] No rhonchi  [  ]  No wheezing  HEART:  [  ] Regular rate and rhythm, [  ] tachycardia, [  ] Bradycardia,  [  ] irregular  [  ] No murmurs, No rubs, No gallops, [  ] PPM in place (Mfr:  )  ABDOMEN:  [  ] Soft, [  ] Nontender, [  ] Nondistended, [  ] No mass, [  ] Bowel sounds present, [  ] obese  NERVOUS SYSTEM:  [  ] Alert & Oriented X3, [  ] Nonfocal  [  ] Confusion  [  ] Encephalopathic [  ] Sedated [  ] Unable to assess, [  ] Other-  EXTREMITIES: [  ] 2+ Peripheral Pulses, No clubbing, No cyanosis,  [  ] edema B/L lower EXT. [  ] PVD stasis skin changes B/L Lower EXT  LYMPH: No lymphadenopathy noted  SKIN:  [  ] No rashes or lesions, [  ] Pressure Ulcers, [  ] ecchymosis, [  ] Skin Tears, [  ] Other    DIET:     LABS:                        7.9    9.11  )-----------( 112      ( 07 Dec 2018 05:22 )             23.5     07 Dec 2018 05:22    134    |  100    |  28     ----------------------------<  80     3.2     |  28     |  1.30     Ca    7.0        07 Dec 2018 05:22    TPro  4.7    /  Alb  2.0    /  TBili  2.9    /  DBili  x      /  AST  34     /  ALT  23     /  AlkPhos  87     07 Dec 2018 05:22    PT/INR - ( 07 Dec 2018 05:22 )   PT: 17.0 sec;   INR: 1.49 ratio         PTT - ( 07 Dec 2018 05:22 )  PTT:26.8 sec  Urinalysis Basic - ( 06 Dec 2018 22:55 )    Color: Yellow / Appearance: Clear / S.010 / pH: x  Gluc: x / Ketone: Negative  / Bili: Small / Urobili: 4   Blood: x / Protein: 25 mg/dL / Nitrite: Negative   Leuk Esterase: Negative / RBC: 3-5 /HPF / WBC 3-5   Sq Epi: x / Non Sq Epi: Occasional / Bacteria: x        Culture Results:   Growth in anaerobic bottle: Gram Negative Rods  Growth in aerobic bottle: Gram Negative Rods ( @ 07:42)  Culture Results:   Growth in anaerobic bottle: Gram Variable Rods  "Due to technical problems, Proteus sp. will Not be reported as part of  the BCID panel until further notice"  ***Blood Panel PCR results on this specimen are available  approximately 3 hours after the Gram stain result.***  Gram stain, PCR, and/or culture results may not always  correspond due to difference in methodologies.  ************************************************************  This PCR assay was performed using [employer].  The following targets are tested for: Enterococcus,  vancomycin resistant enterococci, Listeria monocytogenes,  coagulase negative staphylococci, S. aureus,  methicillin resistant S. aureus, Streptococcus agalactiae  (Group B), S. pneumoniae, S. pyogenes (Group A),  Acinetobacter baumannii, Enterobacter cloacae, E. coli,  Klebsiella oxytoca, K. pneumoniae, Proteus sp.,  Serratia marcescens, Haemophilus influenzae,  Neisseria meningitidis, Pseudomonas aeruginosa, Candida  albicans, C. glabrata, C krusei, C parapsilosis,  C. tropicalis and the KPC resistance gene. ( @ 07:42)  Culture Results:   No growth at 5 days. ( @ 20:55)  Culture Results:   No growth at 5 days. ( @ 20:55)  Culture Results:   <10,000 CFU/ml Normal Urogenital ninfa present ( @ 20:19)      culture blood  -- .Blood Blood-Peripheral  07:42    culture urine  --   @ 07:42      CARDIAC MARKERS ( 02 Dec 2018 08:40 )  x     / x     / 504 U/L / x     / x      CARDIAC MARKERS ( 2018 23:51 )  .019 ng/mL / x     / 301 U/L / x     / 2.8 ng/mL  CARDIAC MARKERS ( 2018 16:25 )  .019 ng/mL / x     / x     / x     / x              Culture - Blood (collected 06 Dec 2018 07:42)  Source: .Blood Blood-Peripheral  Gram Stain (06 Dec 2018 19:53):    Growth in anaerobic bottle: Gram Variable Rods  Preliminary Report (06 Dec 2018 19:53):    Growth in anaerobic bottle: Gram Variable Rods    "Due to technical problems, Proteus sp. will Not be reported as part of    the BCID panel until further notice"    ***Blood Panel PCR results on this specimen are available    approximately 3 hours after the Gram stain result.***    Gram stain, PCR, and/or culture results may not always    correspond due to difference in methodologies.    ************************************************************    This PCR assay was performed using [employer].    The following targets are tested for: Enterococcus,    vancomycin resistant enterococci, Listeria monocytogenes,    coagulase negative staphylococci, S. aureus,    methicillin resistant S. aureus, Streptococcus agalactiae    (Group B), S. pneumoniae, S. pyogenes (Group A),    Acinetobacter baumannii, Enterobacter cloacae, E. coli,    Klebsiella oxytoca, K. pneumoniae, Proteus sp.,    Serratia marcescens, Haemophilus influenzae,    Neisseria meningitidis, Pseudomonas aeruginosa, Candida    albicans, C. glabrata, C krusei, C parapsilosis,    C. tropicalis and the KPC resistance gene.  Organism: Blood Culture PCR (06 Dec 2018 23:44)  Organism: Blood Culture PCR (06 Dec 2018 23:44)    Culture - Blood (collected 06 Dec 2018 07:42)  Source: .Blood Blood-Peripheral  Gram Stain (06 Dec 2018 20:52):    Growth in anaerobic bottle: Gram Negative Rods    Growth in aerobic bottle: Gram Negative Rods  Preliminary Report (06 Dec 2018 20:53):    Growth in anaerobic bottle: Gram Negative Rods    Growth in aerobic bottle: Gram Negative Rods    Culture - Blood (collected 2018 20:55)  Source: .Blood Blood  Final Report (05 Dec 2018 21:01):    No growth at 5 days.    Culture - Blood (collected 2018 20:55)  Source: .Blood Blood  Final Report (05 Dec 2018 21:01):    No growth at 5 days.    Culture - Urine (collected 2018 20:19)  Source: .Urine Clean Catch (Midstream)  Final Report (01 Dec 2018 15:16):    <10,000 CFU/ml Normal Urogenital ninfa present         Anemia Panel:  Ferritin, Serum: 127 ng/mL (18 @ 08:01)  Iron Total, Serum: 74 ug/dL (18 @ 08:01)  Iron - Total Binding Capacity.: 172 ug/dL (18 @ 08:01)  Vitamin B12, Serum: 915 pg/mL (18 @ 08:01)  Folate, Serum: 8.0 ng/mL (18 @ 08:01)  Absolute Reticulocytes: 107.1 K/uL (18 @ 06:24)      Thyroid Panel:        Lipase, Serum: 70 U/L (18 @ 16:25)          RADIOLOGY & ADDITIONAL TESTS:      HEALTH ISSUES - PROBLEM Dx:  Fever: Fever  Anemia: Anemia  MISA (acute kidney injury): MISA (acute kidney injury)  GIB (gastrointestinal bleeding): GIB (gastrointestinal bleeding)  Lactic acid blood increased: Lactic acid blood increased  Prophylactic measure: Prophylactic measure  Pedal edema: Pedal edema  Neuropathy: Neuropathy  Hypercholesteremia: Hypercholesteremia  Hypertension: Hypertension  Fatty liver disease, nonalcoholic: Fatty liver disease, nonalcoholic  Hepatic encephalopathy: Hepatic encephalopathy  Diabetes: Diabetes  Near syncope: Near syncope  Vomiting: Vomiting          Consultant(s) Notes Reviewed:  [  ] YES     Care Discussed with [X] Consultants  [  ] Patient  [  ] Family  [  ]   [  ] Social Service  [  ] RN, [  ] Physical Therapy  DVT PPX: [  ] Lovenox, [  ] S C Heparin, [  ] Coumadin, [  ] Xarelto, [  ] Eliquis, [  ] Pradaxa, [  ] IV Heparin drip, [  ] SCD [  ] Contraindication 2 to GI Bleed,[  ] Ambulation  Advanced directive: [  ] None, [  ] DNR/DNI Patient is a 62y old  Male who presents with a chief complaint of vomiting and near syncope (07 Dec 2018 11:23)      INTERVAL HPI:  Pt is a 61 y/o M with PMHx of Type 2 DM, HTN, HLD, obesity, nephrolithiasis (last stone 25 years ago), neuropathy, HE, NAFLD, Hyper ammonemia, recent Gastritis/ esophagitis on EGD & Gram variable teddy bacteremia in 10/2018  who presents with vomiting and  near syncope/falling out of a moving car. Pt states that [place] and felt "off", his vision was cloudy, he pulled over the car and stopped and vomited. Vomitus was "creamy white." Pt denies seeing blood in vomitus. His dtr then took over driving and Pt again felt like he needed to vomit and told dtr to pull over. He thought the car had stopped and he opened the door and fell out of the moving car. He laid on the ground for ~ 15 minutes. Pt says he possibly lost consciousness. States when he was lying on the ground felt very cold. Pt has had "terrible heartburn" for 2 days and felt chills for 2 days. Pt denies abdominal pain, CP, palp, SOB, cough, myalgias, rash, HA. Pt admits to chills, vomiting (4 episodes), diarrhea (from lactulose), dizziness, pedal edema, tingling in hands (chronic, intermittent). Pt denies recent travel, sick contacts, different eating patterns.   Pt states that early this week pt saw her Endocrinologist & started back on Metformin 500 mg daily as per Dr's recommendation.  In ED Pt's vitals were: T(C): 36.3, HR: 91, BP: 85/59, RR: 15, SpO2: 98% on RA  Labs significant for lactate 5.6, , Hgb 10.4, total bili 3.7, alk phos 183, ammonia 72, Mg 1.4. UA - trace LE, mod blood, RBC 6-10, urine glucose 1,000.   CXR - negative chest  Abd Xray - There is mild content in the colon and no overt sense of bowel obstruction.  CT head - no intracranial hemorrhage or mass effect  CT cervical - Multilevel degenerative disc disease/cervical spondylosis, with posterior osteophyte disc complex C6-7 resulting in flattening of the cervical cord. If there is a clinical concern for spinal cord injury, recommend further evaluation with MRI if there are no clinical contraindications.  EKG - NSR, 93bpm    Pt given 2L NS bolus, Zofran pantoprazole, famotidine, Carafate , Pt seen by GI Dr Morales in ER & was admitted for further Eval.    18: Pt seen, examined, Had total 4 bloody BM so far , Lactate improved, pt had CT A/P with IV Contrast. Drop in H/H. plan for, start Protonix drip,  2 u pRBC 1 u FFP & 5 mg Vit K x 1 as d/w Dr Franks -GI, Elevated ammonia level. Coagulopathy, Improving Lactate. Ac Blood loss Anemia.    18: Pt seen, Examined, pt was transferred to ICU on 18 for symptomatic upper GI Bleed, S/P 7  units pRBC in Total , 3 FFP, 1 platelets transfusion given, Pt had emergent EGD done that found Ac Bleeding Duodenal ulcer found. S/P Epi given. Homeostasis obtained, Pt seen By Sx Dr Hernandez. H/H Stable,  today with MISA, Coagulopathy , Leukocytosis & High Lactate level.  H/H Low stable with Ac Blood  Loss Anemia. Hematology Dr Rios called, as pt is followed by Dr Rios- H/O as out pt.    12/3/18: Patient seen and examined this morning at bedside. Patient denies any acute complaints this morning. Hgb drop from 8.2 --> 7.7. Leukocytosis present at 15.08. Lactate level drop from 3.2 --> 2.5    19: Patient seen and examined this morning. Patient was sitting out of bed in chair, awake, alert, and oriented x3. Hb dropped to 6.6.  Patient states he is feeling better since yesterday. Patient had EGD done yesterday that showed large bleeding Duodenal ulcer s/p clips x2, epi injection, and cautery. Patient was given 3 units of packed red blood cells yesterday. Hbg increased from 7.3 --> 7.8. WBC count drop from 12.53 --> 9.47. Patient tolerating ice chips and clear liquid diet. Patient denies fever, chills, headache, weakness chest pain, palpitations, sob, N/V, diarrhea, constipation, blood in stool, urinary symptoms. Patient admits to feeling fatigued. Has not had a BM today.     18: patient seen, examined at bedside, patient lying comfortably in bed. Hgb at 7.8. WBC 9.35, patient tolerating clear liquid diet. Patient denies fevers, chills, CP, palpitations, N/V/D. As per nurse, BM are still dark. Patient is asymptomatic at this time. On Regular diet.     18: Patient seen and examine at bedside this morning. Patient lying comfortably in bed. Patient was febrile overnight 102F. Tylenol prn, AVOID NSAIDS (risk of rebleed). Hgb drop 7.8 --> 7.4. repeat H/H 8.6.  WBC 8.63. Creatinine up .98-->1.3. Tolerating regular carbohydrate diet, ate breakfast this morning. States that this was his second meal and has not had any associated N/V. Patient states he had a BM earlier this morning that was normal in consistency without any blood in stool. Patient is asymptomatic at this time. Patients BP is in 100/50's this AM, patient states that his pressure normal runs between "100-110". Patient denies headache, fatigue, weakness, chest pain, sob, palpitations, N/V, abdominal pain, diarrhea, constipation. PT saw aptient today and alerted patient felt light-headed. Will re-assess. ID Dr Rios called. Repeat H/H stable.    18: Patient seen and examined at bedside this morning. Patient lying comfortably in bed. Has no acute complaints this morning. Patient was afebrile overnight. Blood cultures are positive for "Gram negative rods & Gram variable rods." Started on Zosyn IVPB 3.375g q8h.  Hgb at 8.1. K+ drop from 3.6 -->3.1. Patient reports black BM yesterday. No BM this morning. Patient admits to fatigue. Denies headache, fever, chills, SOB, chest pain, palpitations, abdominal pain, diarrhea, constipation, blood in stool, urinary symptoms.     OVERNIGHT EVENTS: NONE      Home Medications:  Bacid (LAC) oral tablet: 1 tab(s) orally 2 times a day for  2weeks (2018 19:41)  HumaLOG 100 units/mL subcutaneous solution: 20 unit(s) subcutaneous 2 times a day(breakfast and lunch) (2018 19:41)  HumaLOG 100 units/mL subcutaneous solution: 30 unit(s) subcutaneous once a day(@dinner) (2018 19:41)  irbesartan 300 mg oral tablet: 1 tab(s) orally once a day (2018 19:41)  lactulose 10 g/15 mL oral syrup: 15 milliliter(s) orally 3 times a day (2018 19:41)  Lasix 20 mg oral tablet: 1 tab(s) orally once a day (2018 19:41)  pantoprazole 40 mg oral delayed release tablet: 1 tab(s) orally once a day (2018 19:41)  phytonadione 100 mcg oral tablet: 1 tab(s) orally once a day (2018 19:41)  potassium chloride 10 mEq oral tablet, extended release: 1 tab(s) orally once a day (2018 19:41)  pravastatin 40 mg oral tablet: 1 tab(s) orally once a day (2018 19:41)  Vitamin D3 2000 intl units oral tablet: 1 tab(s) orally once a day (2018 19:41)      MEDICATIONS  (STANDING):  atorvastatin 10 milliGRAM(s) Oral at bedtime  dextrose 5%. 1000 milliLiter(s) (50 mL/Hr) IV Continuous <Continuous>  dextrose 50% Injectable 12.5 Gram(s) IV Push once  dextrose 50% Injectable 25 Gram(s) IV Push once  dextrose 50% Injectable 25 Gram(s) IV Push once  gabapentin 200 milliGRAM(s) Oral four times a day  insulin glargine Injectable (LANTUS) 50 Unit(s) SubCutaneous at bedtime  insulin lispro (HumaLOG) corrective regimen sliding scale   SubCutaneous three times a day before meals  insulin lispro (HumaLOG) corrective regimen sliding scale   SubCutaneous at bedtime  lactulose Syrup 15 Gram(s) Oral two times a day  pantoprazole    Tablet 40 milliGRAM(s) Oral two times a day  piperacillin/tazobactam IVPB. 3.375 Gram(s) IV Intermittent every 8 hours  potassium chloride    Tablet ER 40 milliEquivalent(s) Oral every 4 hours  rifaximin 550 milliGRAM(s) Oral two times a day  sucralfate 1 Gram(s) Oral every 6 hours  ursodiol Capsule 300 milliGRAM(s) Oral three times a day    MEDICATIONS  (PRN):  acetaminophen   Tablet .. 650 milliGRAM(s) Oral every 6 hours PRN Temp greater or equal to 38C (100.4F)  dextrose 40% Gel 15 Gram(s) Oral once PRN Blood Glucose LESS THAN 70 milliGRAM(s)/deciliter  glucagon  Injectable 1 milliGRAM(s) IntraMuscular once PRN Glucose LESS THAN 70 milligrams/deciliter  lidocaine 2% Gel 1 Application(s) Topical every 3 hours PRN pain      Allergies    codeine (Anaphylaxis)    Intolerances        REVIEW OF SYSTEMS:  CONSTITUTIONAL: Admits to fatigue. No fever, No chills, No myalgia, No Body ache, No Weakness  EYES: No eye pain,  No visual disturbances, No discharge, NO Redness  ENMT:  No ear pain, No nose bleed, No vertigo; No sinus or throat pain, No Congestion  NECK: No pain, No stiffness  RESPIRATORY: No cough, wheezing, No  hemoptysis, No shortness of breath  CARDIOVASCULAR: No chest pain, palpitations  GASTROINTESTINAL: No abdominal or epigastric pain. No nausea, No vomiting; No diarrhea or constipation. [ x ] BM black   GENITOURINARY: No dysuria, No frequency, No urgency, No hematuria, or incontinence  NEUROLOGICAL: No headaches, No dizziness, No numbness, No tingling, No tremors, No weakness  EXT: No Swelling, No Pain, No Edema  SKIN:  [ x ] No itching, burning, rashes, or lesions   MUSCULOSKELETAL: No joint pain or swelling; No muscle pain, No back pain, No extremity pain  PSYCHIATRIC: No depression, anxiety, mood swings or difficulty sleeping at night  PAIN SCALE: [ x ] None  [  ] Other-  ROS Unable to obtain due to - [  ] Dementia  [  ] Lethargy  [  ] Sedated [  ] non verbal  REST OF REVIEW Of SYSTEM - [ x ] Normal     Vital Signs Last 24 Hrs  T(C): 36.7 (07 Dec 2018 11:50), Max: 38.1 (06 Dec 2018 20:32)  T(F): 98 (07 Dec 2018 11:50), Max: 100.5 (06 Dec 2018 20:32)  HR: 75 (07 Dec 2018 11:50) (75 - 102)  BP: 115/67 (07 Dec 2018 11:50) (100/55 - 127/65)  BP(mean): --  RR: 17 (07 Dec 2018 11:50) (17 - 18)  SpO2: 96% (07 Dec 2018 11:50) (93% - 99%)  Finger Stick         @ 07:  -   @ 07:00  --------------------------------------------------------  IN: 0 mL / OUT: 900 mL / NET: -900 mL     @ 07:  -   @ 13:32  --------------------------------------------------------  IN: 400 mL / OUT: 0 mL / NET: 400 mL        PHYSICAL EXAM:  GENERAL:  [ x ] NAD , [ x ] well appearing, [ x ] Agitated, [  ] Drowsy,  [  ] Lethargy, [  ] confused   HEAD:  [ x ] Normal, [  ] Other  EYES:  [ x ] EOMI, [ x ] PERRLA, [ x ] conjunctiva pale [x] sclera normal, [  ] Other,  [ x ] Pallor,[  ] Discharge  ENMT:  [ x ] Normal, [ x ] Moist mucous membranes, [ x ] Good dentition, [ x ] No Thrush  NECK:  [ x ] Supple, [ x ] No JVD, [ x ] Normal thyroid, [  ] Lymphadenopathy [  ] Other  CHEST/LUNG:  [ x ] Clear to auscultation bilaterally, [ x ] Breath Sounds equal,  [ x ] No rales, [ x ] No rhonchi  [ x ]  No wheezing  HEART:  [ x ] Regular rate and rhythm, [  ] tachycardia, [  ] Bradycardia,  [  ] irregular  [ x ] No murmurs, No rubs, No gallops, [  ] PPM in place (Mfr:  )  ABDOMEN:  [ x ] Soft, [ x ] Nontender, [ x ] Nondistended, [ x ] No mass, [ x ] Bowel sounds present, [  ] obese  NERVOUS SYSTEM:  [ x ] Alert & Oriented X3, [  ] Nonfocal  [  ] Confusion  [  ] Encephalopathic [  ] Sedated [  ] Unable to assess, [  ] Other-  EXTREMITIES: [ x ] 2+ Peripheral Pulses, No clubbing, No cyanosis,  [  ] edema B/L lower EXT. [  ] PVD stasis skin changes B/L Lower EXT  LYMPH: No lymphadenopathy noted  SKIN:  [ x ] No rashes or lesions, [  ] Pressure Ulcers, [  ] ecchymosis, [  ] Skin Tears, [  ] Other    DIET: Consistent Carbohydrate diet w/ evening snack - salt and cholesterol restricted    LABS:                        7.9    9.11  )-----------( 112      ( 07 Dec 2018 05:22 )             23.5     07 Dec 2018 05:22    134    |  100    |  28     ----------------------------<  80     3.2     |  28     |  1.30     Ca    7.0        07 Dec 2018 05:22    TPro  4.7    /  Alb  2.0    /  TBili  2.9    /  DBili  x      /  AST  34     /  ALT  23     /  AlkPhos  87     07 Dec 2018 05:22    PT/INR - ( 07 Dec 2018 05:22 )   PT: 17.0 sec;   INR: 1.49 ratio         PTT - ( 07 Dec 2018 05:22 )  PTT:26.8 sec  Urinalysis Basic - ( 06 Dec 2018 22:55 )    Color: Yellow / Appearance: Clear / S.010 / pH: x  Gluc: x / Ketone: Negative  / Bili: Small / Urobili: 4   Blood: x / Protein: 25 mg/dL / Nitrite: Negative   Leuk Esterase: Negative / RBC: 3-5 /HPF / WBC 3-5   Sq Epi: x / Non Sq Epi: Occasional / Bacteria: x        Culture Results:   Growth in anaerobic bottle: Gram Negative Rods  Growth in aerobic bottle: Gram Negative Rods ( @ 07:42)  Culture Results:   Growth in anaerobic bottle: Gram Variable Rods  "Due to technical problems, Proteus sp. will Not be reported as part of  the BCID panel until further notice"  ***Blood Panel PCR results on this specimen are available  approximately 3 hours after the Gram stain result.***  Gram stain, PCR, and/or culture results may not always  correspond due to difference in methodologies.  ************************************************************  This PCR assay was performed using [employer].  The following targets are tested for: Enterococcus,  vancomycin resistant enterococci, Listeria monocytogenes,  coagulase negative staphylococci, S. aureus,  methicillin resistant S. aureus, Streptococcus agalactiae  (Group B), S. pneumoniae, S. pyogenes (Group A),  Acinetobacter baumannii, Enterobacter cloacae, E. coli,  Klebsiella oxytoca, K. pneumoniae, Proteus sp.,  Serratia marcescens, Haemophilus influenzae,  Neisseria meningitidis, Pseudomonas aeruginosa, Candida  albicans, C. glabrata, C krusei, C parapsilosis,  C. tropicalis and the KPC resistance gene. ( @ 07:42)  Culture Results:   No growth at 5 days. ( @ 20:55)  Culture Results:   No growth at 5 days. ( @ 20:55)  Culture Results:   <10,000 CFU/ml Normal Urogenital ninfa present ( @ 20:19)      culture blood  -- .Blood Blood-Peripheral  @ 07:42    culture urine  --   @ 07:42      CARDIAC MARKERS ( 02 Dec 2018 08:40 )  x     / x     / 504 U/L / x     / x      CARDIAC MARKERS ( 2018 23:51 )  .019 ng/mL / x     / 301 U/L / x     / 2.8 ng/mL  CARDIAC MARKERS ( 2018 16:25 )  .019 ng/mL / x     / x     / x     / x              Culture - Blood (collected 06 Dec 2018 07:42)  Source: .Blood Blood-Peripheral  Gram Stain (06 Dec 2018 19:53):    Growth in anaerobic bottle: Gram Variable Rods  Preliminary Report (06 Dec 2018 19:53):    Growth in anaerobic bottle: Gram Variable Rods    "Due to technical problems, Proteus sp. will Not be reported as part of    the BCID panel until further notice"    ***Blood Panel PCR results on this specimen are available    approximately 3 hours after the Gram stain result.***    Gram stain, PCR, and/or culture results may not always    correspond due to difference in methodologies.    ************************************************************    This PCR assay was performed using [employer].    The following targets are tested for: Enterococcus,    vancomycin resistant enterococci, Listeria monocytogenes,    coagulase negative staphylococci, S. aureus,    methicillin resistant S. aureus, Streptococcus agalactiae    (Group B), S. pneumoniae, S. pyogenes (Group A),    Acinetobacter baumannii, Enterobacter cloacae, E. coli,    Klebsiella oxytoca, K. pneumoniae, Proteus sp.,    Serratia marcescens, Haemophilus influenzae,    Neisseria meningitidis, Pseudomonas aeruginosa, Candida    albicans, C. glabrata, C krusei, C parapsilosis,    C. tropicalis and the KPC resistance gene.  Organism: Blood Culture PCR (06 Dec 2018 23:44)  Organism: Blood Culture PCR (06 Dec 2018 23:44)    Culture - Blood (collected 06 Dec 2018 07:42)  Source: .Blood Blood-Peripheral  Gram Stain (06 Dec 2018 20:52):    Growth in anaerobic bottle: Gram Negative Rods    Growth in aerobic bottle: Gram Negative Rods  Preliminary Report (06 Dec 2018 20:53):    Growth in anaerobic bottle: Gram Negative Rods    Growth in aerobic bottle: Gram Negative Rods    Culture - Blood (collected 2018 20:55)  Source: .Blood Blood  Final Report (05 Dec 2018 21:01):    No growth at 5 days.    Culture - Blood (collected 2018 20:55)  Source: .Blood Blood  Final Report (05 Dec 2018 21:01):    No growth at 5 days.    Culture - Urine (collected 2018 20:19)  Source: .Urine Clean Catch (Midstream)  Final Report (01 Dec 2018 15:16):    <10,000 CFU/ml Normal Urogenital ninfa present         Anemia Panel:  Ferritin, Serum: 127 ng/mL (18 @ 08:01)  Iron Total, Serum: 74 ug/dL (18 @ 08:01)  Iron - Total Binding Capacity.: 172 ug/dL (18 @ 08:01)  Vitamin B12, Serum: 915 pg/mL (18 @ 08:01)  Folate, Serum: 8.0 ng/mL (18 @ 08:01)  Absolute Reticulocytes: 107.1 K/uL (18 @ 06:24)      Thyroid Panel:        Lipase, Serum: 70 U/L (18 @ 16:25)          RADIOLOGY & ADDITIONAL TESTS:      HEALTH ISSUES - PROBLEM Dx:  Fever: Fever  Anemia: Anemia  MISA (acute kidney injury): MISA (acute kidney injury)  GIB (gastrointestinal bleeding): GIB (gastrointestinal bleeding)  Lactic acid blood increased: Lactic acid blood increased  Prophylactic measure: Prophylactic measure  Pedal edema: Pedal edema  Neuropathy: Neuropathy  Hypercholesteremia: Hypercholesteremia  Hypertension: Hypertension  Fatty liver disease, nonalcoholic: Fatty liver disease, nonalcoholic  Hepatic encephalopathy: Hepatic encephalopathy  Diabetes: Diabetes  Near syncope: Near syncope  Vomiting: Vomiting          Consultant(s) Notes Reviewed:  [ x ] YES     Care Discussed with [X] Consultants  [  ] Patient  [  ] Family  [  ]   [  ] Social Service  [  ] RN, [  ] Physical Therapy  DVT PPX: [  ] Lovenox, [  ] S C Heparin, [  ] Coumadin, [  ] Xarelto, [  ] Eliquis, [  ] Pradaxa, [  ] IV Heparin drip, [  ] SCD [ x ] Contraindication 2 to GI Bleed,[  ] Ambulation  Advanced directive: [ x ] None, [  ] DNR/DNI Patient is a 62y old  Male who presents with a chief complaint of vomiting and near syncope (07 Dec 2018 11:23)      INTERVAL HPI:  Pt is a 61 y/o M with PMHx of Type 2 DM, HTN, HLD, obesity, nephrolithiasis (last stone 25 years ago), neuropathy, HE, NAFLD, Hyper ammonemia, recent Gastritis/ esophagitis on EGD & Gram variable teddy bacteremia in 10/2018  who presents with vomiting and  near syncope/falling out of a moving car. Pt [place] and [place]", his vision was cloudy, he pulled over the car and stopped and vomited. Vomitus was "creamy white." Pt denies seeing blood in vomitus. His dtr then took over driving and Pt again felt like he needed to vomit and told dtr to pull over. He thought the car had stopped and he opened the door and fell out of the moving car. He laid on the ground for ~ 15 minutes. Pt says he possibly lost consciousness. States when he was lying on the ground felt very cold. Pt has had "terrible heartburn" for 2 days and felt chills for 2 days. Pt denies abdominal pain, CP, palp, SOB, cough, myalgias, rash, HA. Pt admits to chills, vomiting (4 episodes), diarrhea (from lactulose), dizziness, pedal edema, tingling in hands (chronic, intermittent). Pt denies recent travel, sick contacts, different eating patterns.   Pt states that early this week pt saw her Endocrinologist & started back on Metformin 500 mg daily as per Dr's recommendation.  In ED Pt's vitals were: T(C): 36.3, HR: 91, BP: 85/59, RR: 15, SpO2: 98% on RA  Labs significant for lactate 5.6, , Hgb 10.4, total bili 3.7, alk phos 183, ammonia 72, Mg 1.4. UA - trace LE, mod blood, RBC 6-10, urine glucose 1,000.   CXR - negative chest  Abd Xray - There is mild content in the colon and no overt sense of bowel obstruction.  CT head - no intracranial hemorrhage or mass effect  CT cervical - Multilevel degenerative disc disease/cervical spondylosis, with posterior osteophyte disc complex C6-7 resulting in flattening of the cervical cord. If there is a clinical concern for spinal cord injury, recommend further evaluation with MRI if there are no clinical contraindications.  EKG - NSR, 93bpm    Pt given 2L NS bolus, Zofran pantoprazole, famotidine, Carafate , Pt seen by GI Dr Morales in ER & was admitted for further Eval.    18: Pt seen, examined, Had total 4 bloody BM so far , Lactate improved, pt had CT A/P with IV Contrast. Drop in H/H. plan for, start Protonix drip,  2 u pRBC 1 u FFP & 5 mg Vit K x 1 as d/w Dr Franks -GI, Elevated ammonia level. Coagulopathy, Improving Lactate. Ac Blood loss Anemia.    18: Pt seen, Examined, pt was transferred to ICU on 18 for symptomatic upper GI Bleed, S/P 7  units pRBC in Total , 3 FFP, 1 platelets transfusion given, Pt had emergent EGD done that found Ac Bleeding Duodenal ulcer found. S/P Epi given. Homeostasis obtained, Pt seen By Sx Dr Hernandez. H/H Stable,  today with MISA, Coagulopathy , Leukocytosis & High Lactate level.  H/H Low stable with Ac Blood  Loss Anemia. Hematology Dr Rios called, as pt is followed by Dr Rios- H/O as out pt.    12/3/18: Patient seen and examined this morning at bedside. Patient denies any acute complaints this morning. Hgb drop from 8.2 --> 7.7. Leukocytosis present at 15.08. Lactate level drop from 3.2 --> 2.5    19: Patient seen and examined this morning. Patient was sitting out of bed in chair, awake, alert, and oriented x3. Hb dropped to 6.6.  Patient states he is feeling better since yesterday. Patient had EGD done yesterday that showed large bleeding Duodenal ulcer s/p clips x2, epi injection, and cautery. Patient was given 3 units of packed red blood cells yesterday. Hbg increased from 7.3 --> 7.8. WBC count drop from 12.53 --> 9.47. Patient tolerating ice chips and clear liquid diet. Patient denies fever, chills, headache, weakness chest pain, palpitations, sob, N/V, diarrhea, constipation, blood in stool, urinary symptoms. Patient admits to feeling fatigued. Has not had a BM today.     18: patient seen, examined at bedside, patient lying comfortably in bed. Hgb at 7.8. WBC 9.35, patient tolerating clear liquid diet. Patient denies fevers, chills, CP, palpitations, N/V/D. As per nurse, BM are still dark. Patient is asymptomatic at this time. On Regular diet.     18: Patient seen and examine at bedside this morning. Patient lying comfortably in bed. Patient was febrile overnight 102F. Tylenol prn, AVOID NSAIDS (risk of rebleed). Hgb drop 7.8 --> 7.4. repeat H/H 8.6.  WBC 8.63. Creatinine up .98-->1.3. Tolerating regular carbohydrate diet, ate breakfast this morning. States that this was his second meal and has not had any associated N/V. Patient states he had a BM earlier this morning that was normal in consistency without any blood in stool. Patient is asymptomatic at this time. Patients BP is in 100/50's this AM, patient states that his pressure normal runs between "100-110". Patient denies headache, fatigue, weakness, chest pain, sob, palpitations, N/V, abdominal pain, diarrhea, constipation. PT saw aptient today and alerted patient felt light-headed. Will re-assess. ID Dr Rios called. Repeat H/H stable.    18: Patient seen and examined at bedside this morning. Patient lying comfortably in bed. Has no acute complaints this morning. Patient was afebrile overnight. Blood cultures are positive for "Gram negative rods & Gram variable rods." Started on Zosyn IVPB 3.375g q8h.  Hgb at 8.1. K+ drop from 3.6 -->3.1. Repleted with 40 meq Kcl tablet q4h for 2 doses. Patient reports black BM yesterday. No BM this morning. Patient admits to fatigue. Denies headache, fever, chills, SOB, chest pain, palpitations, abdominal pain, diarrhea, constipation, blood in stool, urinary symptoms.     OVERNIGHT EVENTS: NONE      Home Medications:  Bacid (LAC) oral tablet: 1 tab(s) orally 2 times a day for  2weeks (2018 19:41)  HumaLOG 100 units/mL subcutaneous solution: 20 unit(s) subcutaneous 2 times a day(breakfast and lunch) (2018 19:41)  HumaLOG 100 units/mL subcutaneous solution: 30 unit(s) subcutaneous once a day(@dinner) (2018 19:41)  irbesartan 300 mg oral tablet: 1 tab(s) orally once a day (2018 19:41)  lactulose 10 g/15 mL oral syrup: 15 milliliter(s) orally 3 times a day (2018 19:41)  Lasix 20 mg oral tablet: 1 tab(s) orally once a day (2018 19:41)  pantoprazole 40 mg oral delayed release tablet: 1 tab(s) orally once a day (2018 19:41)  phytonadione 100 mcg oral tablet: 1 tab(s) orally once a day (2018 19:41)  potassium chloride 10 mEq oral tablet, extended release: 1 tab(s) orally once a day (2018 19:41)  pravastatin 40 mg oral tablet: 1 tab(s) orally once a day (2018 19:41)  Vitamin D3 2000 intl units oral tablet: 1 tab(s) orally once a day (2018 19:41)      MEDICATIONS  (STANDING):  atorvastatin 10 milliGRAM(s) Oral at bedtime  dextrose 5%. 1000 milliLiter(s) (50 mL/Hr) IV Continuous <Continuous>  dextrose 50% Injectable 12.5 Gram(s) IV Push once  dextrose 50% Injectable 25 Gram(s) IV Push once  dextrose 50% Injectable 25 Gram(s) IV Push once  gabapentin 200 milliGRAM(s) Oral four times a day  insulin glargine Injectable (LANTUS) 50 Unit(s) SubCutaneous at bedtime  insulin lispro (HumaLOG) corrective regimen sliding scale   SubCutaneous three times a day before meals  insulin lispro (HumaLOG) corrective regimen sliding scale   SubCutaneous at bedtime  lactulose Syrup 15 Gram(s) Oral two times a day  pantoprazole    Tablet 40 milliGRAM(s) Oral two times a day  piperacillin/tazobactam IVPB. 3.375 Gram(s) IV Intermittent every 8 hours  potassium chloride    Tablet ER 40 milliEquivalent(s) Oral every 4 hours  rifaximin 550 milliGRAM(s) Oral two times a day  sucralfate 1 Gram(s) Oral every 6 hours  ursodiol Capsule 300 milliGRAM(s) Oral three times a day    MEDICATIONS  (PRN):  acetaminophen   Tablet .. 650 milliGRAM(s) Oral every 6 hours PRN Temp greater or equal to 38C (100.4F)  dextrose 40% Gel 15 Gram(s) Oral once PRN Blood Glucose LESS THAN 70 milliGRAM(s)/deciliter  glucagon  Injectable 1 milliGRAM(s) IntraMuscular once PRN Glucose LESS THAN 70 milligrams/deciliter  lidocaine 2% Gel 1 Application(s) Topical every 3 hours PRN pain      Allergies    codeine (Anaphylaxis)    Intolerances        REVIEW OF SYSTEMS:  CONSTITUTIONAL: Admits to fatigue. No fever, No chills, No myalgia, No Body ache, No Weakness  EYES: No eye pain,  No visual disturbances, No discharge, NO Redness  ENMT:  No ear pain, No nose bleed, No vertigo; No sinus or throat pain, No Congestion  NECK: No pain, No stiffness  RESPIRATORY: No cough, wheezing, No  hemoptysis, No shortness of breath  CARDIOVASCULAR: No chest pain, palpitations  GASTROINTESTINAL: No abdominal or epigastric pain. No nausea, No vomiting; No diarrhea or constipation. [ x ] BM black   GENITOURINARY: No dysuria, No frequency, No urgency, No hematuria, or incontinence  NEUROLOGICAL: No headaches, No dizziness, No numbness, No tingling, No tremors, No weakness  EXT: No Swelling, No Pain, No Edema  SKIN:  [ x ] No itching, burning, rashes, or lesions   MUSCULOSKELETAL: No joint pain or swelling; No muscle pain, No back pain, No extremity pain  PSYCHIATRIC: No depression, anxiety, mood swings or difficulty sleeping at night  PAIN SCALE: [ x ] None  [  ] Other-  ROS Unable to obtain due to - [  ] Dementia  [  ] Lethargy  [  ] Sedated [  ] non verbal  REST OF REVIEW Of SYSTEM - [ x ] Normal     Vital Signs Last 24 Hrs  T(C): 36.7 (07 Dec 2018 11:50), Max: 38.1 (06 Dec 2018 20:32)  T(F): 98 (07 Dec 2018 11:50), Max: 100.5 (06 Dec 2018 20:32)  HR: 75 (07 Dec 2018 11:50) (75 - 102)  BP: 115/67 (07 Dec 2018 11:50) (100/55 - 127/65)  BP(mean): --  RR: 17 (07 Dec 2018 11:50) (17 - 18)  SpO2: 96% (07 Dec 2018 11:50) (93% - 99%)  Finger Stick         @ 07:  -   @ 07:00  --------------------------------------------------------  IN: 0 mL / OUT: 900 mL / NET: -900 mL     @ 07:  -   @ 13:32  --------------------------------------------------------  IN: 400 mL / OUT: 0 mL / NET: 400 mL        PHYSICAL EXAM:  GENERAL:  [ x ] NAD , [ x ] well appearing, [ x ] Agitated, [  ] Drowsy,  [  ] Lethargy, [  ] confused   HEAD:  [ x ] Normal, [  ] Other  EYES:  [ x ] EOMI, [ x ] PERRLA, [ x ] conjunctiva pale [x] sclera normal, [  ] Other,  [ x ] Pallor,[  ] Discharge  ENMT:  [ x ] Normal, [ x ] Moist mucous membranes, [ x ] Good dentition, [ x ] No Thrush  NECK:  [ x ] Supple, [ x ] No JVD, [ x ] Normal thyroid, [  ] Lymphadenopathy [  ] Other  CHEST/LUNG:  [ x ] Clear to auscultation bilaterally, [ x ] Breath Sounds equal,  [ x ] No rales, [ x ] No rhonchi  [ x ]  No wheezing  HEART:  [ x ] Regular rate and rhythm, [  ] tachycardia, [  ] Bradycardia,  [  ] irregular  [ x ] No murmurs, No rubs, No gallops, [  ] PPM in place (Mfr:  )  ABDOMEN:  [ x ] Soft, [ x ] Nontender, [ x ] Nondistended, [ x ] No mass, [ x ] Bowel sounds present, [  ] obese  NERVOUS SYSTEM:  [ x ] Alert & Oriented X3, [  ] Nonfocal  [  ] Confusion  [  ] Encephalopathic [  ] Sedated [  ] Unable to assess, [  ] Other-  EXTREMITIES: [ x ] 2+ Peripheral Pulses, No clubbing, No cyanosis,  [  ] edema B/L lower EXT. [  ] PVD stasis skin changes B/L Lower EXT  LYMPH: No lymphadenopathy noted  SKIN:  [ x ] No rashes or lesions, [  ] Pressure Ulcers, [  ] ecchymosis, [  ] Skin Tears, [  ] Other    DIET: Consistent Carbohydrate diet w/ evening snack - salt and cholesterol restricted    LABS:                        7.9    9.11  )-----------( 112      ( 07 Dec 2018 05:22 )             23.5     07 Dec 2018 05:22    134    |  100    |  28     ----------------------------<  80     3.2     |  28     |  1.30     Ca    7.0        07 Dec 2018 05:22    TPro  4.7    /  Alb  2.0    /  TBili  2.9    /  DBili  x      /  AST  34     /  ALT  23     /  AlkPhos  87     07 Dec 2018 05:22    PT/INR - ( 07 Dec 2018 05:22 )   PT: 17.0 sec;   INR: 1.49 ratio         PTT - ( 07 Dec 2018 05:22 )  PTT:26.8 sec  Urinalysis Basic - ( 06 Dec 2018 22:55 )    Color: Yellow / Appearance: Clear / S.010 / pH: x  Gluc: x / Ketone: Negative  / Bili: Small / Urobili: 4   Blood: x / Protein: 25 mg/dL / Nitrite: Negative   Leuk Esterase: Negative / RBC: 3-5 /HPF / WBC 3-5   Sq Epi: x / Non Sq Epi: Occasional / Bacteria: x        Culture Results:   Growth in anaerobic bottle: Gram Negative Rods  Growth in aerobic bottle: Gram Negative Rods ( @ 07:42)  Culture Results:   Growth in anaerobic bottle: Gram Variable Rods  "Due to technical problems, Proteus sp. will Not be reported as part of  the BCID panel until further notice"  ***Blood Panel PCR results on this specimen are available  approximately 3 hours after the Gram stain result.***  Gram stain, PCR, and/or culture results may not always  correspond due to difference in methodologies.  ************************************************************  This PCR assay was performed using [employer].  The following targets are tested for: Enterococcus,  vancomycin resistant enterococci, Listeria monocytogenes,  coagulase negative staphylococci, S. aureus,  methicillin resistant S. aureus, Streptococcus agalactiae  (Group B), S. pneumoniae, S. pyogenes (Group A),  Acinetobacter baumannii, Enterobacter cloacae, E. coli,  Klebsiella oxytoca, K. pneumoniae, Proteus sp.,  Serratia marcescens, Haemophilus influenzae,  Neisseria meningitidis, Pseudomonas aeruginosa, Candida  albicans, C. glabrata, C krusei, C parapsilosis,  C. tropicalis and the KPC resistance gene. ( @ 07:42)  Culture Results:   No growth at 5 days. ( @ 20:55)  Culture Results:   No growth at 5 days. ( @ 20:55)  Culture Results:   <10,000 CFU/ml Normal Urogenital ninfa present ( @ 20:19)      culture blood  -- .Blood Blood-Peripheral  07:42    culture urine  --   @ 07:42      CARDIAC MARKERS ( 02 Dec 2018 08:40 )  x     / x     / 504 U/L / x     / x      CARDIAC MARKERS ( 2018 23:51 )  .019 ng/mL / x     / 301 U/L / x     / 2.8 ng/mL  CARDIAC MARKERS ( 2018 16:25 )  .019 ng/mL / x     / x     / x     / x              Culture - Blood (collected 06 Dec 2018 07:42)  Source: .Blood Blood-Peripheral  Gram Stain (06 Dec 2018 19:53):    Growth in anaerobic bottle: Gram Variable Rods  Preliminary Report (06 Dec 2018 19:53):    Growth in anaerobic bottle: Gram Variable Rods    "Due to technical problems, Proteus sp. will Not be reported as part of    the BCID panel until further notice"    ***Blood Panel PCR results on this specimen are available    approximately 3 hours after the Gram stain result.***    Gram stain, PCR, and/or culture results may not always    correspond due to difference in methodologies.    ************************************************************    This PCR assay was performed using [employer].    The following targets are tested for: Enterococcus,    vancomycin resistant enterococci, Listeria monocytogenes,    coagulase negative staphylococci, S. aureus,    methicillin resistant S. aureus, Streptococcus agalactiae    (Group B), S. pneumoniae, S. pyogenes (Group A),    Acinetobacter baumannii, Enterobacter cloacae, E. coli,    Klebsiella oxytoca, K. pneumoniae, Proteus sp.,    Serratia marcescens, Haemophilus influenzae,    Neisseria meningitidis, Pseudomonas aeruginosa, Candida    albicans, C. glabrata, C krusei, C parapsilosis,    C. tropicalis and the KPC resistance gene.  Organism: Blood Culture PCR (06 Dec 2018 23:44)  Organism: Blood Culture PCR (06 Dec 2018 23:44)    Culture - Blood (collected 06 Dec 2018 07:42)  Source: .Blood Blood-Peripheral  Gram Stain (06 Dec 2018 20:52):    Growth in anaerobic bottle: Gram Negative Rods    Growth in aerobic bottle: Gram Negative Rods  Preliminary Report (06 Dec 2018 20:53):    Growth in anaerobic bottle: Gram Negative Rods    Growth in aerobic bottle: Gram Negative Rods    Culture - Blood (collected 2018 20:55)  Source: .Blood Blood  Final Report (05 Dec 2018 21:01):    No growth at 5 days.    Culture - Blood (collected 2018 20:55)  Source: .Blood Blood  Final Report (05 Dec 2018 21:01):    No growth at 5 days.    Culture - Urine (collected 2018 20:19)  Source: .Urine Clean Catch (Midstream)  Final Report (01 Dec 2018 15:16):    <10,000 CFU/ml Normal Urogenital ninfa present         Anemia Panel:  Ferritin, Serum: 127 ng/mL (18 @ 08:01)  Iron Total, Serum: 74 ug/dL (18 @ 08:01)  Iron - Total Binding Capacity.: 172 ug/dL (18 @ 08:01)  Vitamin B12, Serum: 915 pg/mL (18 @ 08:01)  Folate, Serum: 8.0 ng/mL (18 @ 08:01)  Absolute Reticulocytes: 107.1 K/uL (18 @ 06:24)      Thyroid Panel:        Lipase, Serum: 70 U/L (18 @ 16:25)          RADIOLOGY & ADDITIONAL TESTS:      HEALTH ISSUES - PROBLEM Dx:  Fever: Fever  Anemia: Anemia  MISA (acute kidney injury): MISA (acute kidney injury)  GIB (gastrointestinal bleeding): GIB (gastrointestinal bleeding)  Lactic acid blood increased: Lactic acid blood increased  Prophylactic measure: Prophylactic measure  Pedal edema: Pedal edema  Neuropathy: Neuropathy  Hypercholesteremia: Hypercholesteremia  Hypertension: Hypertension  Fatty liver disease, nonalcoholic: Fatty liver disease, nonalcoholic  Hepatic encephalopathy: Hepatic encephalopathy  Diabetes: Diabetes  Near syncope: Near syncope  Vomiting: Vomiting          Consultant(s) Notes Reviewed:  [ x ] YES     Care Discussed with [X] Consultants  [  ] Patient  [  ] Family  [  ]   [  ] Social Service  [  ] RN, [  ] Physical Therapy  DVT PPX: [  ] Lovenox, [  ] S C Heparin, [  ] Coumadin, [  ] Xarelto, [  ] Eliquis, [  ] Pradaxa, [  ] IV Heparin drip, [  ] SCD [ x ] Contraindication 2 to GI Bleed,[  ] Ambulation  Advanced directive: [ x ] None, [  ] DNR/DNI Patient is a 62y old  Male who presents with a chief complaint of vomiting and near syncope (07 Dec 2018 11:23)      INTERVAL HPI:  Pt is a 61 y/o M with PMHx of Type 2 DM, HTN, HLD, obesity, nephrolithiasis (last stone 25 years ago), neuropathy, HE, NAFLD, Hyper ammonemia, recent Gastritis/ esophagitis on EGD & Gram variable teddy bacteremia in 10/2018  who presents with vomiting and  near syncope/falling out of a moving car. Pt [place] and [place]", his vision was cloudy, he pulled over the car and stopped and vomited. Vomitus was "creamy white." Pt denies seeing blood in vomitus. His dtr then took over driving and Pt again felt like he needed to vomit and told dtr to pull over. He thought the car had stopped and he opened the door and fell out of the moving car. He laid on the ground for ~ 15 minutes. Pt says he possibly lost consciousness. States when he was lying on the ground felt very cold. Pt has had "terrible heartburn" for 2 days and felt chills for 2 days. Pt denies abdominal pain, CP, palp, SOB, cough, myalgias, rash, HA. Pt admits to chills, vomiting (4 episodes), diarrhea (from lactulose), dizziness, pedal edema, tingling in hands (chronic, intermittent). Pt denies recent travel, sick contacts, different eating patterns.   Pt states that early this week pt saw her Endocrinologist & started back on Metformin 500 mg daily as per Dr's recommendation.  In ED Pt's vitals were: T(C): 36.3, HR: 91, BP: 85/59, RR: 15, SpO2: 98% on RA  Labs significant for lactate 5.6, , Hgb 10.4, total bili 3.7, alk phos 183, ammonia 72, Mg 1.4. UA - trace LE, mod blood, RBC 6-10, urine glucose 1,000.   CXR - negative chest  Abd Xray - There is mild content in the colon and no overt sense of bowel obstruction.  CT head - no intracranial hemorrhage or mass effect  CT cervical - Multilevel degenerative disc disease/cervical spondylosis, with posterior osteophyte disc complex C6-7 resulting in flattening of the cervical cord. If there is a clinical concern for spinal cord injury, recommend further evaluation with MRI if there are no clinical contraindications.  EKG - NSR, 93bpm    Pt given 2L NS bolus, Zofran pantoprazole, famotidine, Carafate , Pt seen by GI Dr Morales in ER & was admitted for further Eval.    18: Pt seen, examined, Had total 4 bloody BM so far , Lactate improved, pt had CT A/P with IV Contrast. Drop in H/H. plan for, start Protonix drip,  2 u pRBC 1 u FFP & 5 mg Vit K x 1 as d/w Dr Franks -GI, Elevated ammonia level. Coagulopathy, Improving Lactate. Ac Blood loss Anemia.    18: Pt seen, Examined, pt was transferred to ICU on 18 for symptomatic upper GI Bleed, S/P 7  units pRBC in Total , 3 FFP, 1 platelets transfusion given, Pt had emergent EGD done that found Ac Bleeding Duodenal ulcer found. S/P Epi given. Homeostasis obtained, Pt seen By Sx Dr Hernandez. H/H Stable,  today with MISA, Coagulopathy , Leukocytosis & High Lactate level.  H/H Low stable with Ac Blood  Loss Anemia. Hematology Dr Rios called, as pt is followed by Dr Rios- H/O as out pt.    12/3/18: Patient seen and examined this morning at bedside. Patient denies any acute complaints this morning. Hgb drop from 8.2 --> 7.7. Leukocytosis present at 15.08. Lactate level drop from 3.2 --> 2.5    19: Patient seen and examined this morning. Patient was sitting out of bed in chair, awake, alert, and oriented x3. Hb dropped to 6.6.  Patient states he is feeling better since yesterday. Patient had EGD done yesterday that showed large bleeding Duodenal ulcer s/p clips x2, epi injection, and cautery. Patient was given 3 units of packed red blood cells yesterday. Hbg increased from 7.3 --> 7.8. WBC count drop from 12.53 --> 9.47. Patient tolerating ice chips and clear liquid diet. Patient denies fever, chills, headache, weakness chest pain, palpitations, sob, N/V, diarrhea, constipation, blood in stool, urinary symptoms. Patient admits to feeling fatigued. Has not had a BM today.     18: patient seen, examined at bedside, patient lying comfortably in bed. Hgb at 7.8. WBC 9.35, patient tolerating clear liquid diet. Patient denies fevers, chills, CP, palpitations, N/V/D. As per nurse, BM are still dark. Patient is asymptomatic at this time. On Regular diet.     18: Patient seen and examine at bedside this morning. Patient lying comfortably in bed. Patient was febrile overnight 102F. Tylenol prn, AVOID NSAIDS (risk of rebleed). Hgb drop 7.8 --> 7.4. repeat H/H 8.6.  WBC 8.63. Creatinine up .98-->1.3. Tolerating regular carbohydrate diet, ate breakfast this morning. States that this was his second meal and has not had any associated N/V. Patient states he had a BM earlier this morning that was normal in consistency without any blood in stool. Patient is asymptomatic at this time. Patients BP is in 100/50's this AM, patient states that his pressure normal runs between "100-110". Patient denies headache, fatigue, weakness, chest pain, sob, palpitations, N/V, abdominal pain, diarrhea, constipation. PT saw aptient today and alerted patient felt light-headed. Will re-assess. ID Dr Rios called. Repeat H/H stable.    18: Patient seen and examined at bedside this morning. Patient lying comfortably in bed. Has no acute complaints this morning. Patient was afebrile overnight. Blood cultures are positive for "Gram negative rods & Gram variable rods." Started on Zosyn IVPB 3.375g q8h.  Hgb at 8.1. K+ drop from 3.6 -->3.1. Repleted with 40 meq Kcl tablet q4h for 2 doses. Patient reports black BM yesterday. No BM this morning. Patient admits to fatigue. Denies headache, fever, chills, SOB, chest pain, palpitations, abdominal pain, diarrhea, constipation, blood in stool, urinary symptoms.     OVERNIGHT EVENTS: NONE      Home Medications:  Bacid (LAC) oral tablet: 1 tab(s) orally 2 times a day for  2weeks (2018 19:41)  HumaLOG 100 units/mL subcutaneous solution: 20 unit(s) subcutaneous 2 times a day(breakfast and lunch) (2018 19:41)  HumaLOG 100 units/mL subcutaneous solution: 30 unit(s) subcutaneous once a day(@dinner) (2018 19:41)  irbesartan 300 mg oral tablet: 1 tab(s) orally once a day (2018 19:41)  lactulose 10 g/15 mL oral syrup: 15 milliliter(s) orally 3 times a day (2018 19:41)  Lasix 20 mg oral tablet: 1 tab(s) orally once a day (2018 19:41)  pantoprazole 40 mg oral delayed release tablet: 1 tab(s) orally once a day (2018 19:41)  phytonadione 100 mcg oral tablet: 1 tab(s) orally once a day (2018 19:41)  potassium chloride 10 mEq oral tablet, extended release: 1 tab(s) orally once a day (2018 19:41)  pravastatin 40 mg oral tablet: 1 tab(s) orally once a day (2018 19:41)  Vitamin D3 2000 intl units oral tablet: 1 tab(s) orally once a day (2018 19:41)      MEDICATIONS  (STANDING):  atorvastatin 10 milliGRAM(s) Oral at bedtime  dextrose 5%. 1000 milliLiter(s) (50 mL/Hr) IV Continuous <Continuous>  dextrose 50% Injectable 12.5 Gram(s) IV Push once  dextrose 50% Injectable 25 Gram(s) IV Push once  dextrose 50% Injectable 25 Gram(s) IV Push once  gabapentin 200 milliGRAM(s) Oral four times a day  insulin glargine Injectable (LANTUS) 50 Unit(s) SubCutaneous at bedtime  insulin lispro (HumaLOG) corrective regimen sliding scale   SubCutaneous three times a day before meals  insulin lispro (HumaLOG) corrective regimen sliding scale   SubCutaneous at bedtime  lactulose Syrup 15 Gram(s) Oral two times a day  pantoprazole    Tablet 40 milliGRAM(s) Oral two times a day  piperacillin/tazobactam IVPB. 3.375 Gram(s) IV Intermittent every 8 hours  potassium chloride    Tablet ER 40 milliEquivalent(s) Oral every 4 hours  rifaximin 550 milliGRAM(s) Oral two times a day  sucralfate 1 Gram(s) Oral every 6 hours  ursodiol Capsule 300 milliGRAM(s) Oral three times a day    MEDICATIONS  (PRN):  acetaminophen   Tablet .. 650 milliGRAM(s) Oral every 6 hours PRN Temp greater or equal to 38C (100.4F)  dextrose 40% Gel 15 Gram(s) Oral once PRN Blood Glucose LESS THAN 70 milliGRAM(s)/deciliter  glucagon  Injectable 1 milliGRAM(s) IntraMuscular once PRN Glucose LESS THAN 70 milligrams/deciliter  lidocaine 2% Gel 1 Application(s) Topical every 3 hours PRN pain    Allergies    codeine (Anaphylaxis)    Intolerances    REVIEW OF SYSTEMS:  CONSTITUTIONAL: Admits to fatigue. No fever, No chills, No myalgia, No Body ache, slight generalized weakness  EYES: No eye pain,  No visual disturbances, No discharge, NO Redness  ENMT:  No ear pain, No nose bleed, No vertigo; No sinus or throat pain, No Congestion  NECK: No pain, No stiffness  RESPIRATORY: No cough, wheezing, No  hemoptysis, No shortness of breath  CARDIOVASCULAR: No chest pain, palpitations  GASTROINTESTINAL: No abdominal or epigastric pain. No nausea, No vomiting; No diarrhea or constipation. [ x ] BM black   GENITOURINARY: No dysuria, No frequency, No urgency, No hematuria, or incontinence  NEUROLOGICAL: No headaches, No dizziness, No numbness, No tingling, No tremors, No weakness  EXT: No Swelling, No Pain, No Edema  SKIN:  [ x ] No itching, burning, rashes, or lesions   MUSCULOSKELETAL: No joint pain or swelling; No muscle pain, No back pain, No extremity pain  PSYCHIATRIC: No depression, anxiety, mood swings or difficulty sleeping at night  PAIN SCALE: [ x ] None  [  ] Other-  ROS Unable to obtain due to - [  ] Dementia  [  ] Lethargy  [  ] Sedated [  ] non verbal  REST OF REVIEW Of SYSTEM - [ x ] Normal     Vital Signs Last 24 Hrs  T(C): 36.7 (07 Dec 2018 11:50), Max: 38.1 (06 Dec 2018 20:32)  T(F): 98 (07 Dec 2018 11:50), Max: 100.5 (06 Dec 2018 20:32)  HR: 75 (07 Dec 2018 11:50) (75 - 102)  BP: 115/67 (07 Dec 2018 11:50) (100/55 - 127/65)  BP(mean): --  RR: 17 (07 Dec 2018 11:50) (17 - 18)  SpO2: 96% (07 Dec 2018 11:50) (93% - 99%)  Finger Stick         @ 07:  -   @ 07:00  --------------------------------------------------------  IN: 0 mL / OUT: 900 mL / NET: -900 mL     @ 07:01  -   @ 13:32  --------------------------------------------------------  IN: 400 mL / OUT: 0 mL / NET: 400 mL    PHYSICAL EXAM:  GENERAL:  [ x ] NAD , [ x ] well appearing, [  ] Agitated, [  ] Drowsy,  [  ] Lethargy, [  ] confused   HEAD:  [ x ] Normal, [  ] Other  EYES:  [ x ] EOMI, [ x ] PERRLA, [ x ] conjunctiva pale [x] sclera normal, [  ] Other,  [ x ] Pallor,[  ] Discharge  ENMT:  [ x ] Normal, [ x ] Moist mucous membranes, [ x ] Good dentition, [ x ] No Thrush  NECK:  [ x ] Supple, [ x ] No JVD, [ x ] Normal thyroid, [  ] Lymphadenopathy [  ] Other  CHEST/LUNG:  [ x ] Clear to auscultation bilaterally, [ x ] Breath Sounds equal,  [ x ] No rales, [ x ] No rhonchi  [ x ]  No wheezing  HEART:  [ x ] Regular rate and rhythm, [  ] tachycardia, [  ] Bradycardia,  [  ] irregular  [ x ] No murmurs, No rubs, No gallops, [  ] PPM in place (Mfr:  )  ABDOMEN:  [  ] Soft, [ x ] Nontender, [ x ] distended, [ x ] No mass, [ x ] Bowel sounds present, [x  ] obese  NERVOUS SYSTEM:  [ x ] Alert & Oriented X3, [  ] Nonfocal  [  ] Confusion  [  ] Encephalopathic [  ] Sedated [  ] Unable to assess, [  ] Other-  EXTREMITIES: [ x ] 2+ Peripheral Pulses, No clubbing, No cyanosis,  [  ] edema B/L lower EXT. [  ] PVD stasis skin changes B/L Lower EXT  LYMPH: No lymphadenopathy noted  SKIN:  [ x ] No rashes or lesions, [  ] Pressure Ulcers, [  ] ecchymosis, [  ] Skin Tears, [  ] Other    DIET: Consistent Carbohydrate diet w/ evening snack - salt and cholesterol restricted    LABS:                        7.9    9.11  )-----------( 112      ( 07 Dec 2018 05:22 )             23.5     07 Dec 2018 05:22    134    |  100    |  28     ----------------------------<  80     3.2     |  28     |  1.30     Ca    7.0        07 Dec 2018 05:22    TPro  4.7    /  Alb  2.0    /  TBili  2.9    /  DBili  x      /  AST  34     /  ALT  23     /  AlkPhos  87     07 Dec 2018 05:22    PT/INR - ( 07 Dec 2018 05:22 )   PT: 17.0 sec;   INR: 1.49 ratio         PTT - ( 07 Dec 2018 05:22 )  PTT:26.8 sec  Urinalysis Basic - ( 06 Dec 2018 22:55 )    Color: Yellow / Appearance: Clear / S.010 / pH: x  Gluc: x / Ketone: Negative  / Bili: Small / Urobili: 4   Blood: x / Protein: 25 mg/dL / Nitrite: Negative   Leuk Esterase: Negative / RBC: 3-5 /HPF / WBC 3-5   Sq Epi: x / Non Sq Epi: Occasional / Bacteria: x    Culture Results:   Growth in anaerobic bottle: Gram Negative Rods  Growth in aerobic bottle: Gram Negative Rods ( @ 07:42)  Culture Results:   Growth in anaerobic bottle: Gram Variable Rods  "Due to technical problems, Proteus sp. will Not be reported as part of  the BCID panel until further notice"  ***Blood Panel PCR results on this specimen are available  approximately 3 hours after the Gram stain result.***  Gram stain, PCR, and/or culture results may not always  correspond due to difference in methodologies.  ************************************************************  This PCR assay was performed using [employer].  The following targets are tested for: Enterococcus,  vancomycin resistant enterococci, Listeria monocytogenes,  coagulase negative staphylococci, S. aureus,  methicillin resistant S. aureus, Streptococcus agalactiae  (Group B), S. pneumoniae, S. pyogenes (Group A),  Acinetobacter baumannii, Enterobacter cloacae, E. coli,  Klebsiella oxytoca, K. pneumoniae, Proteus sp.,  Serratia marcescens, Haemophilus influenzae,  Neisseria meningitidis, Pseudomonas aeruginosa, Candida  albicans, C. glabrata, C krusei, C parapsilosis,  C. tropicalis and the KPC resistance gene. ( @ 07:42)  Culture Results:   No growth at 5 days. ( @ 20:55)  Culture Results:   No growth at 5 days. ( @ 20:55)  Culture Results:   <10,000 CFU/ml Normal Urogenital ninfa present ( @ 20:19)      culture blood  -- .Blood Blood-Peripheral  07:42    culture urine  --   @ 07:42      CARDIAC MARKERS ( 02 Dec 2018 08:40 )  x     / x     / 504 U/L / x     / x      CARDIAC MARKERS ( 2018 23:51 )  .019 ng/mL / x     / 301 U/L / x     / 2.8 ng/mL  CARDIAC MARKERS ( 2018 16:25 )  .019 ng/mL / x     / x     / x     / x              Culture - Blood (collected 06 Dec 2018 07:42)  Source: .Blood Blood-Peripheral  Gram Stain (06 Dec 2018 19:53):    Growth in anaerobic bottle: Gram Variable Rods  Preliminary Report (06 Dec 2018 19:53):    Growth in anaerobic bottle: Gram Variable Rods    "Due to technical problems, Proteus sp. will Not be reported as part of    the BCID panel until further notice"    ***Blood Panel PCR results on this specimen are available    approximately 3 hours after the Gram stain result.***    Gram stain, PCR, and/or culture results may not always    correspond due to difference in methodologies.    ************************************************************    This PCR assay was performed using [employer].    The following targets are tested for: Enterococcus,    vancomycin resistant enterococci, Listeria monocytogenes,    coagulase negative staphylococci, S. aureus,    methicillin resistant S. aureus, Streptococcus agalactiae    (Group B), S. pneumoniae, S. pyogenes (Group A),    Acinetobacter baumannii, Enterobacter cloacae, E. coli,    Klebsiella oxytoca, K. pneumoniae, Proteus sp.,    Serratia marcescens, Haemophilus influenzae,    Neisseria meningitidis, Pseudomonas aeruginosa, Candida    albicans, C. glabrata, C krusei, C parapsilosis,    C. tropicalis and the KPC resistance gene.  Organism: Blood Culture PCR (06 Dec 2018 23:44)  Organism: Blood Culture PCR (06 Dec 2018 23:44)    Culture - Blood (collected 06 Dec 2018 07:42)  Source: .Blood Blood-Peripheral  Gram Stain (06 Dec 2018 20:52):    Growth in anaerobic bottle: Gram Negative Rods    Growth in aerobic bottle: Gram Negative Rods  Preliminary Report (06 Dec 2018 20:53):    Growth in anaerobic bottle: Gram Negative Rods    Growth in aerobic bottle: Gram Negative Rods    Culture - Blood (collected 2018 20:55)  Source: .Blood Blood  Final Report (05 Dec 2018 21:01):    No growth at 5 days.    Culture - Blood (collected 2018 20:55)  Source: .Blood Blood  Final Report (05 Dec 2018 21:01):    No growth at 5 days.    Culture - Urine (collected 2018 20:19)  Source: .Urine Clean Catch (Midstream)  Final Report (01 Dec 2018 15:16):    <10,000 CFU/ml Normal Urogenital ninfa present         Anemia Panel:  Ferritin, Serum: 127 ng/mL (18 @ 08:01)  Iron Total, Serum: 74 ug/dL (18 @ 08:01)  Iron - Total Binding Capacity.: 172 ug/dL (18 @ 08:01)  Vitamin B12, Serum: 915 pg/mL (18 @ 08:01)  Folate, Serum: 8.0 ng/mL (18 @ 08:01)  Absolute Reticulocytes: 107.1 K/uL (18 @ 06:24)      Thyroid Panel:        Lipase, Serum: 70 U/L (18 @ 16:25)          RADIOLOGY & ADDITIONAL TESTS:      HEALTH ISSUES - PROBLEM Dx:  Fever: Fever  Anemia: Anemia  MISA (acute kidney injury): MISA (acute kidney injury)  GIB (gastrointestinal bleeding): GIB (gastrointestinal bleeding)  Lactic acid blood increased: Lactic acid blood increased  Prophylactic measure: Prophylactic measure  Pedal edema: Pedal edema  Neuropathy: Neuropathy  Hypercholesteremia: Hypercholesteremia  Hypertension: Hypertension  Fatty liver disease, nonalcoholic: Fatty liver disease, nonalcoholic  Hepatic encephalopathy: Hepatic encephalopathy  Diabetes: Diabetes  Near syncope: Near syncope  Vomiting: Vomiting          Consultant(s) Notes Reviewed:  [ x ] YES     Care Discussed with [X] Consultants  [  ] Patient  [  ] Family  [  ]   [  ] Social Service  [  ] RN, [  ] Physical Therapy  DVT PPX: [  ] Lovenox, [  ] S C Heparin, [  ] Coumadin, [  ] Xarelto, [  ] Eliquis, [  ] Pradaxa, [  ] IV Heparin drip, [  ] SCD [ x ] Contraindication 2 to GI Bleed,[  ] Ambulation  Advanced directive: [ x ] None, [  ] DNR/DNI Patient is a 62y old  Male who presents with a chief complaint of vomiting and near syncope (07 Dec 2018 11:23)      INTERVAL HPI:  Pt is a 61 y/o M with PMHx of Type 2 DM, HTN, HLD, obesity, nephrolithiasis (last stone 25 years ago), neuropathy, HE, NAFLD, Hyper ammonemia, recent Gastritis/ esophagitis on EGD & Gram variable teddy bacteremia in 10/2018  who presents with vomiting and  near syncope/falling out of a moving car. Pt states that [place] and felt "off", his vision was cloudy, he pulled over the car and stopped and vomited. Vomitus was "creamy white." Pt denies seeing blood in vomitus. His dtr then took over driving and Pt again felt like he needed to vomit and told dtr to pull over. He thought the car had stopped and he opened the door and fell out of the moving car. He laid on the ground for ~ 15 minutes. Pt says he possibly lost consciousness. States when he was lying on the ground felt very cold. Pt has had "terrible heartburn" for 2 days and felt chills for 2 days. Pt denies abdominal pain, CP, palp, SOB, cough, myalgias, rash, HA. Pt admits to chills, vomiting (4 episodes), diarrhea (from lactulose), dizziness, pedal edema, tingling in hands (chronic, intermittent). Pt denies recent travel, sick contacts, different eating patterns.   Pt states that early this week pt saw her Endocrinologist & started back on Metformin 500 mg daily as per Dr's recommendation.  In ED Pt's vitals were: T(C): 36.3, HR: 91, BP: 85/59, RR: 15, SpO2: 98% on RA  Labs significant for lactate 5.6, , Hgb 10.4, total bili 3.7, alk phos 183, ammonia 72, Mg 1.4. UA - trace LE, mod blood, RBC 6-10, urine glucose 1,000.   CXR - negative chest  Abd Xray - There is mild content in the colon and no overt sense of bowel obstruction.  CT head - no intracranial hemorrhage or mass effect  CT cervical - Multilevel degenerative disc disease/cervical spondylosis, with posterior osteophyte disc complex C6-7 resulting in flattening of the cervical cord. If there is a clinical concern for spinal cord injury, recommend further evaluation with MRI if there are no clinical contraindications.  EKG - NSR, 93bpm    Pt given 2L NS bolus, Zofran pantoprazole, famotidine, Carafate , Pt seen by GI Dr Morales in ER & was admitted for further Eval.    18: Pt seen, examined, Had total 4 bloody BM so far , Lactate improved, pt had CT A/P with IV Contrast. Drop in H/H. plan for, start Protonix drip,  2 u pRBC 1 u FFP & 5 mg Vit K x 1 as d/w Dr Franks -GI, Elevated ammonia level. Coagulopathy, Improving Lactate. Ac Blood loss Anemia.    18: Pt seen, Examined, pt was transferred to ICU on 18 for symptomatic upper GI Bleed, S/P 7  units pRBC in Total , 3 FFP, 1 platelets transfusion given, Pt had emergent EGD done that found Ac Bleeding Duodenal ulcer found. S/P Epi given. Homeostasis obtained, Pt seen By Sx Dr Hernandez. H/H Stable,  today with MISA, Coagulopathy , Leukocytosis & High Lactate level.  H/H Low stable with Ac Blood  Loss Anemia. Hematology Dr Rios called, as pt is followed by Dr Rios- H/O as out pt.    12/3/18: Patient seen and examined this morning at bedside. Patient denies any acute complaints this morning. Hgb drop from 8.2 --> 7.7. Leukocytosis present at 15.08. Lactate level drop from 3.2 --> 2.5    19: Patient seen and examined this morning. Patient was sitting out of bed in chair, awake, alert, and oriented x3. Hb dropped to 6.6.  Patient states he is feeling better since yesterday. Patient had EGD done yesterday that showed large bleeding Duodenal ulcer s/p clips x2, epi injection, and cautery. Patient was given 3 units of packed red blood cells yesterday. Hbg increased from 7.3 --> 7.8. WBC count drop from 12.53 --> 9.47. Patient tolerating ice chips and clear liquid diet. Patient denies fever, chills, headache, weakness chest pain, palpitations, sob, N/V, diarrhea, constipation, blood in stool, urinary symptoms. Patient admits to feeling fatigued. Has not had a BM today.     18: patient seen, examined at bedside, patient lying comfortably in bed. Hgb at 7.8. WBC 9.35, patient tolerating clear liquid diet. Patient denies fevers, chills, CP, palpitations, N/V/D. As per nurse, BM are still dark. Patient is asymptomatic at this time. On Regular diet.     18: Patient seen and examine at bedside this morning. Patient lying comfortably in bed. Patient was febrile overnight 102F. Tylenol prn, AVOID NSAIDS (risk of rebleed). Hgb drop 7.8 --> 7.4. repeat H/H 8.6.  WBC 8.63. Creatinine up .98-->1.3. Tolerating regular carbohydrate diet, ate breakfast this morning. States that this was his second meal and has not had any associated N/V. Patient states he had a BM earlier this morning that was normal in consistency without any blood in stool. Patient is asymptomatic at this time. Patients BP is in 100/50's this AM, patient states that his pressure normal runs between "100-110". Patient denies headache, fatigue, weakness, chest pain, sob, palpitations, N/V, abdominal pain, diarrhea, constipation. PT saw aptient today and alerted patient felt light-headed. Will re-assess. ID Dr Rios called. Repeat H/H stable.    18: Patient seen and examined at bedside this morning. Patient lying comfortably in bed. Has no acute complaints this morning. Patient was afebrile overnight.  fever last night ,Blood cultures are positive for "Gram negative rods & Gram variable rods." Started on Zosyn IVPB 3.375g q8h.  Hgb at 8.1. K+ drop from 3.6 -->3.1. Repleted with 40 meq Kcl tablet q4h for 2 doses. Patient reports black BM yesterday. No BM this morning. Patient admits to fatigue. Denies headache, fever, chills, SOB, chest pain, palpitations, abdominal pain, diarrhea, constipation, blood in stool, urinary symptoms.     OVERNIGHT EVENTS: NONE      Home Medications:  Bacid (LAC) oral tablet: 1 tab(s) orally 2 times a day for  2weeks (2018 19:41)  HumaLOG 100 units/mL subcutaneous solution: 20 unit(s) subcutaneous 2 times a day(breakfast and lunch) (2018 19:41)  HumaLOG 100 units/mL subcutaneous solution: 30 unit(s) subcutaneous once a day(@dinner) (2018 19:41)  irbesartan 300 mg oral tablet: 1 tab(s) orally once a day (2018 19:41)  lactulose 10 g/15 mL oral syrup: 15 milliliter(s) orally 3 times a day (2018 19:41)  Lasix 20 mg oral tablet: 1 tab(s) orally once a day (2018 19:41)  pantoprazole 40 mg oral delayed release tablet: 1 tab(s) orally once a day (2018 19:41)  phytonadione 100 mcg oral tablet: 1 tab(s) orally once a day (2018 19:41)  potassium chloride 10 mEq oral tablet, extended release: 1 tab(s) orally once a day (2018 19:41)  pravastatin 40 mg oral tablet: 1 tab(s) orally once a day (2018 19:41)  Vitamin D3 2000 intl units oral tablet: 1 tab(s) orally once a day (2018 19:41)      MEDICATIONS  (STANDING):  atorvastatin 10 milliGRAM(s) Oral at bedtime  dextrose 5%. 1000 milliLiter(s) (50 mL/Hr) IV Continuous <Continuous>  dextrose 50% Injectable 12.5 Gram(s) IV Push once  dextrose 50% Injectable 25 Gram(s) IV Push once  dextrose 50% Injectable 25 Gram(s) IV Push once  gabapentin 200 milliGRAM(s) Oral four times a day  insulin glargine Injectable (LANTUS) 50 Unit(s) SubCutaneous at bedtime  insulin lispro (HumaLOG) corrective regimen sliding scale   SubCutaneous three times a day before meals  insulin lispro (HumaLOG) corrective regimen sliding scale   SubCutaneous at bedtime  lactulose Syrup 15 Gram(s) Oral two times a day  pantoprazole    Tablet 40 milliGRAM(s) Oral two times a day  piperacillin/tazobactam IVPB. 3.375 Gram(s) IV Intermittent every 8 hours  potassium chloride    Tablet ER 40 milliEquivalent(s) Oral every 4 hours  rifaximin 550 milliGRAM(s) Oral two times a day  sucralfate 1 Gram(s) Oral every 6 hours  ursodiol Capsule 300 milliGRAM(s) Oral three times a day    MEDICATIONS  (PRN):  acetaminophen   Tablet .. 650 milliGRAM(s) Oral every 6 hours PRN Temp greater or equal to 38C (100.4F)  dextrose 40% Gel 15 Gram(s) Oral once PRN Blood Glucose LESS THAN 70 milliGRAM(s)/deciliter  glucagon  Injectable 1 milliGRAM(s) IntraMuscular once PRN Glucose LESS THAN 70 milligrams/deciliter  lidocaine 2% Gel 1 Application(s) Topical every 3 hours PRN pain    Allergies    codeine (Anaphylaxis)    Intolerances    REVIEW OF SYSTEMS: C/o gas   CONSTITUTIONAL: Admits to fatigue. No fever, No chills, No myalgia, No Body ache, slight generalized weakness  EYES: No eye pain,  No visual disturbances, No discharge, NO Redness  ENMT:  No ear pain, No nose bleed, No vertigo; No sinus or throat pain, No Congestion  NECK: No pain, No stiffness  RESPIRATORY: No cough, wheezing, No  hemoptysis, No shortness of breath  CARDIOVASCULAR: No chest pain, palpitations  GASTROINTESTINAL: No abdominal or epigastric pain. No nausea, No vomiting; No diarrhea or constipation. [ x ] BM Regular   GENITOURINARY: No dysuria, No frequency, No urgency, No hematuria, or incontinence  NEUROLOGICAL: No headaches, No dizziness, No numbness, No tingling, No tremors, No weakness  EXT: No Swelling, No Pain, No Edema  SKIN:  [ x ] No itching, burning, rashes, or lesions   MUSCULOSKELETAL: No joint pain or swelling; No muscle pain, No back pain, No extremity pain  PSYCHIATRIC: No depression, anxiety, mood swings or difficulty sleeping at night  PAIN SCALE: [ x ] None  [  ] Other-  ROS Unable to obtain due to - [  ] Dementia  [  ] Lethargy  [  ] Sedated [  ] non verbal  REST OF REVIEW Of SYSTEM - [ x ] Normal     Vital Signs Last 24 Hrs  T(C): 36.7 (07 Dec 2018 11:50), Max: 38.1 (06 Dec 2018 20:32)  T(F): 98 (07 Dec 2018 11:50), Max: 100.5 (06 Dec 2018 20:32)  HR: 75 (07 Dec 2018 11:50) (75 - 102)  BP: 115/67 (07 Dec 2018 11:50) (100/55 - 127/65)  BP(mean): --  RR: 17 (07 Dec 2018 11:50) (17 - 18)  SpO2: 96% (07 Dec 2018 11:50) (93% - 99%)  Finger Stick         @ 07:  -   @ 07:00  --------------------------------------------------------  IN: 0 mL / OUT: 900 mL / NET: -900 mL     @ 07:01  -   @ 13:32  --------------------------------------------------------  IN: 400 mL / OUT: 0 mL / NET: 400 mL    PHYSICAL EXAM:  GENERAL:  [ x ] NAD , [ x ] well appearing, [  ] Agitated, [  ] Drowsy,  [  ] Lethargy, [  ] confused   HEAD:  [ x ] Normal, [  ] Other  EYES:  [ x ] EOMI, [ x ] PERRLA, [ x ] conjunctiva pale [x] sclera normal, [  ] Other,  [ x ] Pallor,[  ] Discharge  ENMT:  [ x ] Normal, [ x ] Moist mucous membranes, [ x ] Good dentition, [ x ] No Thrush  NECK:  [ x ] Supple, [ x ] No JVD, [ x ] Normal thyroid, [  ] Lymphadenopathy [  ] Other  CHEST/LUNG:  [ x ] Clear to auscultation bilaterally, [ x ] Breath Sounds equal,  [ x ] No rales, [ x ] No rhonchi  [ x ]  No wheezing  HEART:  [ x ] Regular rate and rhythm, [  ] tachycardia, [  ] Bradycardia,  [  ] irregular  [ x ] No murmurs, No rubs, No gallops, [  ] PPM in place (Mfr:  )  ABDOMEN:  [  ] Soft, [ x ] Nontender, [ x ] distended, [ x ] No mass, [ x ] Bowel sounds present, [x  ] obese  NERVOUS SYSTEM:  [ x ] Alert & Oriented X3, [  ] Nonfocal  [  ] Confusion  [  ] Encephalopathic [  ] Sedated [  ] Unable to assess, [  ] Other-  EXTREMITIES: [ x ] 2+ Peripheral Pulses, No clubbing, No cyanosis,  [  ] edema B/L lower EXT. [  ] PVD stasis skin changes B/L Lower EXT  LYMPH: No lymphadenopathy noted  SKIN:  [ x ] No rashes or lesions, [  ] Pressure Ulcers, [  ] ecchymosis, [  ] Skin Tears, [  ] Other    DIET: Consistent Carbohydrate diet w/ evening snack - salt and cholesterol restricted    LABS:                      8.1    x     )-----------( x        ( 07 Dec 2018 14:24 )             24.5                           7.9    9.11  )-----------( 112      ( 07 Dec 2018 05:22 )             23.5     07 Dec 2018 05:22    134    |  100    |  28     ----------------------------<  80     3.2     |  28     |  1.30     Ca    7.0        07 Dec 2018 05:22    TPro  4.7    /  Alb  2.0    /  TBili  2.9    /  DBili  x      /  AST  34     /  ALT  23     /  AlkPhos  87     07 Dec 2018 05:22    PT/INR - ( 07 Dec 2018 05:22 )   PT: 17.0 sec;   INR: 1.49 ratio         PTT - ( 07 Dec 2018 05:22 )  PTT:26.8 sec  Urinalysis Basic - ( 06 Dec 2018 22:55 )    Color: Yellow / Appearance: Clear / S.010 / pH: x  Gluc: x / Ketone: Negative  / Bili: Small / Urobili: 4   Blood: x / Protein: 25 mg/dL / Nitrite: Negative   Leuk Esterase: Negative / RBC: 3-5 /HPF / WBC 3-5   Sq Epi: x / Non Sq Epi: Occasional / Bacteria: x    Culture Results:   Growth in anaerobic bottle: Gram Negative Rods  Growth in aerobic bottle: Gram Negative Rods ( @ 07:42)  Culture Results:   Growth in anaerobic bottle: Gram Variable Rods  "Due to technical problems, Proteus sp. will Not be reported as part of  the BCID panel until further notice"  ***Blood Panel PCR results on this specimen are available  approximately 3 hours after the Gram stain result.***  Gram stain, PCR, and/or culture results may not always  correspond due to difference in methodologies.  ************************************************************  This PCR assay was performed using [employer].  The following targets are tested for: Enterococcus,  vancomycin resistant enterococci, Listeria monocytogenes,  coagulase negative staphylococci, S. aureus,  methicillin resistant S. aureus, Streptococcus agalactiae  (Group B), S. pneumoniae, S. pyogenes (Group A),  Acinetobacter baumannii, Enterobacter cloacae, E. coli,  Klebsiella oxytoca, K. pneumoniae, Proteus sp.,  Serratia marcescens, Haemophilus influenzae,  Neisseria meningitidis, Pseudomonas aeruginosa, Candida  albicans, C. glabrata, C krusei, C parapsilosis,  C. tropicalis and the KPC resistance gene. ( @ 07:42)  Culture Results:   No growth at 5 days. ( @ 20:55)  Culture Results:   No growth at 5 days. ( @ 20:55)  Culture Results:   <10,000 CFU/ml Normal Urogenital ninfa present ( @ 20:19)      culture blood  -- .Blood Blood-Peripheral  @ 07:42    culture urine  --   @ 07:42      CARDIAC MARKERS ( 02 Dec 2018 08:40 )  x     / x     / 504 U/L / x     / x      CARDIAC MARKERS ( 2018 23:51 )  .019 ng/mL / x     / 301 U/L / x     / 2.8 ng/mL  CARDIAC MARKERS ( 2018 16:25 )  .019 ng/mL / x     / x     / x     / x              Culture - Blood (collected 06 Dec 2018 07:42)  Source: .Blood Blood-Peripheral  Gram Stain (06 Dec 2018 19:53):    Growth in anaerobic bottle: Gram Variable Rods  Preliminary Report (06 Dec 2018 19:53):    Growth in anaerobic bottle: Gram Variable Rods    "Due to technical problems, Proteus sp. will Not be reported as part of    the BCID panel until further notice"    ***Blood Panel PCR results on this specimen are available    approximately 3 hours after the Gram stain result.***    Gram stain, PCR, and/or culture results may not always    correspond due to difference in methodologies.    ************************************************************    This PCR assay was performed using [employer].    The following targets are tested for: Enterococcus,    vancomycin resistant enterococci, Listeria monocytogenes,    coagulase negative staphylococci, S. aureus,    methicillin resistant S. aureus, Streptococcus agalactiae    (Group B), S. pneumoniae, S. pyogenes (Group A),    Acinetobacter baumannii, Enterobacter cloacae, E. coli,    Klebsiella oxytoca, K. pneumoniae, Proteus sp.,    Serratia marcescens, Haemophilus influenzae,    Neisseria meningitidis, Pseudomonas aeruginosa, Candida    albicans, C. glabrata, C krusei, C parapsilosis,    C. tropicalis and the KPC resistance gene.  Organism: Blood Culture PCR (06 Dec 2018 23:44)  Organism: Blood Culture PCR (06 Dec 2018 23:44)    Culture - Blood (collected 06 Dec 2018 07:42)  Source: .Blood Blood-Peripheral  Gram Stain (06 Dec 2018 20:52):    Growth in anaerobic bottle: Gram Negative Rods    Growth in aerobic bottle: Gram Negative Rods  Preliminary Report (06 Dec 2018 20:53):    Growth in anaerobic bottle: Gram Negative Rods    Growth in aerobic bottle: Gram Negative Rods    Culture - Blood (collected 2018 20:55)  Source: .Blood Blood  Final Report (05 Dec 2018 21:01):    No growth at 5 days.    Culture - Blood (collected 2018 20:55)  Source: .Blood Blood  Final Report (05 Dec 2018 21:01):    No growth at 5 days.    Culture - Urine (collected 2018 20:19)  Source: .Urine Clean Catch (Midstream)  Final Report (01 Dec 2018 15:16):    <10,000 CFU/ml Normal Urogenital ninfa present         Anemia Panel:  Ferritin, Serum: 127 ng/mL (18 @ 08:01)  Iron Total, Serum: 74 ug/dL (18 @ 08:01)  Iron - Total Binding Capacity.: 172 ug/dL (18 @ 08:01)  Vitamin B12, Serum: 915 pg/mL (18 @ 08:01)  Folate, Serum: 8.0 ng/mL (18 @ 08:01)  Absolute Reticulocytes: 107.1 K/uL (18 @ 06:24)      Thyroid Panel:        Lipase, Serum: 70 U/L (18 @ 16:25)    RADIOLOGY & ADDITIONAL TESTS:NONE      HEALTH ISSUES - PROBLEM Dx:  Fever: Fever  Anemia: Anemia  MISA (acute kidney injury): MISA (acute kidney injury)  GIB (gastrointestinal bleeding): GIB (gastrointestinal bleeding)  Lactic acid blood increased: Lactic acid blood increased  Prophylactic measure: Prophylactic measure  Pedal edema: Pedal edema  Neuropathy: Neuropathy  Hypercholesteremia: Hypercholesteremia  Hypertension: Hypertension  Fatty liver disease, nonalcoholic: Fatty liver disease, nonalcoholic  Hepatic encephalopathy: Hepatic encephalopathy  Diabetes: Diabetes  Near syncope: Near syncope  Vomiting: Vomiting          Consultant(s) Notes Reviewed:  [ x ] YES     Care Discussed with [X] Consultants  [ x ] Patient  [  ] Family  [  ]   [  ] Social Service  [x  ] RN, [  ] Physical Therapy  DVT PPX: [  ] Lovenox, [  ] S C Heparin, [  ] Coumadin, [  ] Xarelto, [  ] Eliquis, [  ] Pradaxa, [  ] IV Heparin drip, [ x ] SCD [ x ] Contraindication 2 to GI Bleed,[  ] Ambulation  Advanced directive: [ x ] None, [  ] DNR/DNI

## 2018-12-07 NOTE — PROGRESS NOTE ADULT - SUBJECTIVE AND OBJECTIVE BOX
Neurology follow up note    JESSICA [id number]      Interval History:    Patient feels ok no new complaints feels stronger     MEDICATIONS    acetaminophen   Tablet .. 650 milliGRAM(s) Oral every 6 hours PRN  atorvastatin 10 milliGRAM(s) Oral at bedtime  dextrose 40% Gel 15 Gram(s) Oral once PRN  dextrose 5%. 1000 milliLiter(s) IV Continuous <Continuous>  dextrose 50% Injectable 12.5 Gram(s) IV Push once  dextrose 50% Injectable 25 Gram(s) IV Push once  dextrose 50% Injectable 25 Gram(s) IV Push once  gabapentin 200 milliGRAM(s) Oral four times a day  glucagon  Injectable 1 milliGRAM(s) IntraMuscular once PRN  insulin glargine Injectable (LANTUS) 50 Unit(s) SubCutaneous at bedtime  insulin lispro (HumaLOG) corrective regimen sliding scale   SubCutaneous three times a day before meals  insulin lispro (HumaLOG) corrective regimen sliding scale   SubCutaneous at bedtime  lactulose Syrup 15 Gram(s) Oral two times a day  lidocaine 2% Gel 1 Application(s) Topical every 3 hours PRN  pantoprazole    Tablet 40 milliGRAM(s) Oral two times a day  piperacillin/tazobactam IVPB. 3.375 Gram(s) IV Intermittent every 8 hours  potassium chloride    Tablet ER 40 milliEquivalent(s) Oral every 4 hours  rifaximin 550 milliGRAM(s) Oral two times a day  sucralfate 1 Gram(s) Oral every 6 hours  ursodiol Capsule 300 milliGRAM(s) Oral three times a day      Allergies    codeine (Anaphylaxis)    Intolerances            Vital Signs Last 24 Hrs  T(C): 37.2 (07 Dec 2018 08:00), Max: 38.1 (06 Dec 2018 20:32)  T(F): 98.9 (07 Dec 2018 08:00), Max: 100.5 (06 Dec 2018 20:32)  HR: 83 (07 Dec 2018 08:00) (83 - 102)  BP: 108/61 (07 Dec 2018 08:00) (100/55 - 127/65)  BP(mean): --  RR: 17 (07 Dec 2018 08:00) (17 - 18)  SpO2: 95% (07 Dec 2018 08:00) (94% - 99%)    REVIEW OF SYSTEMS:  Constitutional: No fever, chills, fatigue, generalized weakness  Eyes: no eye pain, visual disturbances, or discharge  ENT:  No difficulty hearing, tinnitus, vertigo; No sinus or throat pain  Neck: No pain or stiffness  Respiratory: No cough, dyspnea, wheezing   Cardiovascular: No chest pain, palpitations,   Gastrointestinal: No abdominal or epigastric pain. No nausea, vomiting  No diarrhea or constipation.   Genitourinary: No dysuria, frequency, hematuria or incontinence  Neurological: No headaches, OCC  lightheadedness, no vertigo, numbness or tremors  Psychiatric: No depression, anxiety, mood swings or difficulty sleeping  Musculoskeletal: No joint pain or swelling; No muscle, back or extremity pain  Skin: No itching, burning, rashes or lesions   Lymph Nodes: No enlarged glands  Endocrine: No heat or cold intolerance; No hair loss   Allergy and Immunologic: No hives or eczema    On Neurological Examination:    The patient is awake, alert, oriented x3.    Extraocular movements were intact.    Pupils equal, round and reactive bilaterally, 3 mm to 2.    Speech was fluent.  Smile symmetric.    Motor:  Bilateral upper and lower 4+/5.    Sensory:  Bilateral upper and lower intact to light touch.  No drift.  Finger-to-nose within normal limits.    Follow simple commands      GENERAL Exam: Nontoxic , No Acute Distress   	  HEENT:  normocephalic, atraumatic  		  LUNGS: Clear bilaterally    	  HEART: Normal S1S2   No murmur RRR        	  GI/ ABDOMEN:  Soft  Non tender    EXTREMITIES:   No Edema  No Clubbing  No Cyanosis     MUSCULOSKELETAL: decreased Range of Motion all 4 extremities   	   SKIN: Normal  No Ecchymosis                  LABS:  CBC Full  -  ( 07 Dec 2018 05:22 )  WBC Count : 9.11 K/uL  Hemoglobin : 7.9 g/dL  Hematocrit : 23.5 %  Platelet Count - Automated : 112 K/uL  Mean Cell Volume : 90.0 fl  Mean Cell Hemoglobin : 30.3 pg  Mean Cell Hemoglobin Concentration : 33.6 gm/dL  Auto Neutrophil # : 6.47 K/uL  Auto Lymphocyte # : 1.55 K/uL  Auto Monocyte # : 0.74 K/uL  Auto Eosinophil # : 0.20 K/uL  Auto Basophil # : 0.03 K/uL  Auto Neutrophil % : 71.1 %  Auto Lymphocyte % : 17.0 %  Auto Monocyte % : 8.1 %  Auto Eosinophil % : 2.2 %  Auto Basophil % : 0.3 %    Urinalysis Basic - ( 06 Dec 2018 22:55 )    Color: Yellow / Appearance: Clear / S.010 / pH: x  Gluc: x / Ketone: Negative  / Bili: Small / Urobili: 4   Blood: x / Protein: 25 mg/dL / Nitrite: Negative   Leuk Esterase: Negative / RBC: 3-5 /HPF / WBC 3-5   Sq Epi: x / Non Sq Epi: Occasional / Bacteria: x          134<L>  |  100  |  28<H>  ----------------------------<  80  3.2<L>   |  28  |  1.30    Ca    7.0<L>      07 Dec 2018 05:22  Phos  2.8       Mg     1.7         TPro  4.7<L>  /  Alb  2.0<L>  /  TBili  2.9<H>  /  DBili  x   /  AST  34  /  ALT  23  /  AlkPhos  87      Hemoglobin A1C:     LIVER FUNCTIONS - ( 07 Dec 2018 05:22 )  Alb: 2.0 g/dL / Pro: 4.7 g/dL / ALK PHOS: 87 U/L / ALT: 23 U/L / AST: 34 U/L / GGT: x           Vitamin B12   PT/INR - ( 07 Dec 2018 05:22 )   PT: 17.0 sec;   INR: 1.49 ratio         PTT - ( 07 Dec 2018 05:22 )  PTT:26.8 sec      RADIOLOGY    ANALYSIS AND PLAN:  A 62-year-old with episode of loss of consciousness.  1.	For episode of loss of consciousness, suspect most likely a syncopal event with a prodrome of feeling lightheaded, blurry vision, warm and sweaty, nausea and vomiting, as per spouse looked pale and becoming in the emergency room hypotensive points towards cerebral hypoperfusion.  There is no clear sign on examination to suggest a new cerebrovascular accident has ensued and there is no clear history to suggest underlying epilepsy.  2.	I would recommend telemetry evaluation, rule out underlying arrhythmia.  3.	Monitor systolic blood pressure.  4.	For history of diabetes, would recommend strict control of the patient's blood sugars.  5.	For history of neuropathy, continue the patient on his gabapentin.  6.	For episode of spasms, no new events   7.	Spoke with spouse, Ada,  Her telephone number is [phone] no response  today 18  8.	monitor H/H   9.	GI work up as needed S/P GI bleed  10.	no new events   11.	day by day doing better   12.	monitor oral intake   13.	physical therapy    if possible and as tolerated       Greater than 40 minutes spent in direct patient care reviewing  the notes, lab data/ imaging , discussion with multidisciplinary team.

## 2018-12-07 NOTE — PROGRESS NOTE ADULT - PROBLEM SELECTOR PLAN 3
Anemia & Thrombocytopenia  Ac Blood loss Anemia, 2/2 Bleeding DU  S/P EGD x 2 by GI  -Pt had Multiple pRBC transfusions , FFP & Platelets transfusions.  - As per Dr. David dorsey - consider transfusing to Hgb of 8  Dr Duran's D/W H/H , No Transfusion as Hb > 8  Todays Hgb at 7.9. Repeat at 8.0 Anemia & Thrombocytopenia- Acute Blood loss Anemia with Bleeding DU - H/H Low stable. Hgb at 7.9 & 8.1 today  Ac Blood loss Anemia, 2/2 Bleeding DU  S/P EGD x 2 by GI Dr Morales   -Pt had Multiple pRBC transfusions , FFP & Platelets transfusions.  - As per Dr. Hernandez recs - consider transfusing to Hgb of 8  Dr Duran's D/W H/H , No Transfusion as Hb > 8  Todays Hgb at 7.9. Repeat at 8.0

## 2018-12-07 NOTE — PROGRESS NOTE ADULT - SUBJECTIVE AND OBJECTIVE BOX
[INTERVAL HX: ]  Patient seen and examined;  Chart reviewed and events noted;   Denies bleeding.   s/p PRBC tcfusion yesterday.   had mild LH withambulation in AM<, but OK in PM.     MEDICATIONS  (STANDING):  atorvastatin 10 milliGRAM(s) Oral at bedtime  dextrose 5%. 1000 milliLiter(s) (50 mL/Hr) IV Continuous <Continuous>  dextrose 50% Injectable 12.5 Gram(s) IV Push once  dextrose 50% Injectable 25 Gram(s) IV Push once  dextrose 50% Injectable 25 Gram(s) IV Push once  gabapentin 200 milliGRAM(s) Oral four times a day  insulin glargine Injectable (LANTUS) 50 Unit(s) SubCutaneous at bedtime  insulin lispro (HumaLOG) corrective regimen sliding scale   SubCutaneous three times a day before meals  insulin lispro (HumaLOG) corrective regimen sliding scale   SubCutaneous at bedtime  lactulose Syrup 15 Gram(s) Oral two times a day  pantoprazole    Tablet 40 milliGRAM(s) Oral two times a day  piperacillin/tazobactam IVPB. 3.375 Gram(s) IV Intermittent every 8 hours  rifaximin 550 milliGRAM(s) Oral two times a day  sucralfate 1 Gram(s) Oral every 6 hours  ursodiol Capsule 300 milliGRAM(s) Oral three times a day    MEDICATIONS  (PRN):  acetaminophen   Tablet .. 650 milliGRAM(s) Oral every 6 hours PRN Temp greater or equal to 38C (100.4F)  aluminum hydroxide/magnesium hydroxide/simethicone Suspension 30 milliLiter(s) Oral every 6 hours PRN Dyspepsia  dextrose 40% Gel 15 Gram(s) Oral once PRN Blood Glucose LESS THAN 70 milliGRAM(s)/deciliter  glucagon  Injectable 1 milliGRAM(s) IntraMuscular once PRN Glucose LESS THAN 70 milligrams/deciliter  lidocaine 2% Gel 1 Application(s) Topical every 3 hours PRN pain  simethicone 80 milliGRAM(s) Chew every 6 hours PRN Gas      Vital Signs Last 24 Hrs  T(C): 36.9 (07 Dec 2018 15:55), Max: 37.6 (07 Dec 2018 05:13)  T(F): 98.5 (07 Dec 2018 15:55), Max: 99.7 (07 Dec 2018 05:13)  HR: 88 (07 Dec 2018 15:55) (75 - 89)  BP: 110/66 (07 Dec 2018 15:55) (100/55 - 115/67)  BP(mean): --  RR: 17 (07 Dec 2018 15:55) (17 - 18)  SpO2: 100% (07 Dec 2018 15:55) (93% - 100%)    [PHYSICAL EXAM]  General: adult in NAD,  WN,  WD. obese  HEENT: clear oropharynx, anicteric sclera, pink conjunctivae.  Neck: supple, no masses.  CV: normal S1S2, no murmur, no rubs, no gallops.  Lungs: clear to auscultation, no wheezes, no rales, no rhonchi.  Abdomen: soft, non-tender, non-distended, no hepatosplenomegaly, normal BS, no guarding.  Ext: no clubbing, no cyanosis, trace edema.  Skin: no rashes,  no petechiae, no venous stasis changes.  Neuro: alert and oriented X3, no focal motor deficits.  LN: no CHELSEA.      [LABS:]                        8.1    x     )-----------( x        ( 07 Dec 2018 14:24 )             24.5     12-07    134<L>  |  100  |  28<H>  ----------------------------<  80  3.2<L>   |  28  |  1.30    Ca    7.0<L>      07 Dec 2018 05:22  Phos  2.8     12-06  Mg     1.7     12-06    TPro  4.7<L>  /  Alb  2.0<L>  /  TBili  2.9<H>  /  DBili  x   /  AST  34  /  ALT  23  /  AlkPhos  87  12-07    PT/INR - ( 07 Dec 2018 05:22 )   PT: 17.0 sec;   INR: 1.49 ratio         PTT - ( 07 Dec 2018 05:22 )  PTT:26.8 sec      [RADIOLOGY STUDIES:]

## 2018-12-07 NOTE — PROGRESS NOTE ADULT - PROBLEM SELECTOR PLAN 6
2/2 Cirrhosis -ammonia level improved ? 2/2 GI Bleed ; Pt not confused at baseline MS, no asterixis  -- High Ammonia level 53  on lactulose 2x day    rifaximin 550 BID 2/2 Cirrhosis -ammonia level improved ? 2/2 GI Bleed ; Pt not confused at baseline MS, no asterixis  -- High Ammonia level stable  on lactulose 2x day    rifaximin 550 BID 2/2 Cirrhosis -ammonia level improved ? 2/2 GI Bleed ; Pt not confused at baseline MS, no asterixis  - Ammonia level stable  on lactulose 2x day    rifaximin 550 BID

## 2018-12-07 NOTE — PROGRESS NOTE ADULT - SUBJECTIVE AND OBJECTIVE BOX
Hospital day: 7    62y Male admitted with Syncope and collapse  .    Patient seen and examined bedside resting comfortably.  Pt doing well, tolerating PO intake.  Has not had any more bloody bowel movements.  No bowel movements overnight.  Pt is passing flatus.  Denies abdominal pain.  Denies SOB, chest pain, lower leg/calf tenderness.  Is ambulating, and is currently awaiting physical therapy to come and walk with him.      T(F): 98.9 (12-07-18 @ 08:00), Max: 100.5 (12-06-18 @ 20:32)  HR: 83 (12-07-18 @ 08:00) (83 - 102)  BP: 108/61 (12-07-18 @ 08:00) (100/55 - 127/65)  RR: 17 (12-07-18 @ 08:00) (17 - 18)  SpO2: 95% (12-07-18 @ 08:00) (94% - 99%)  Wt(kg): --  CAPILLARY BLOOD GLUCOSE      POCT Blood Glucose.: 87 mg/dL (07 Dec 2018 07:50)  POCT Blood Glucose.: 150 mg/dL (06 Dec 2018 21:33)  POCT Blood Glucose.: 150 mg/dL (06 Dec 2018 16:39)  POCT Blood Glucose.: 163 mg/dL (06 Dec 2018 11:41)      PHYSICAL EXAM:  General: NAD, WDWN.   Neuro:  Alert & oriented x 3  Abdomen: soft, NT, ND. Normactive BS  Extremities: no pedal edema or calf tenderness noted       LABS:                        7.9    9.11  )-----------( 112      ( 07 Dec 2018 05:22 )             23.5     12-07    134<L>  |  100  |  28<H>  ----------------------------<  80  3.2<L>   |  28  |  1.30    Ca    7.0<L>      07 Dec 2018 05:22  Phos  2.8     12-06  Mg     1.7     12-06    TPro  4.7<L>  /  Alb  2.0<L>  /  TBili  2.9<H>  /  DBili  x   /  AST  34  /  ALT  23  /  AlkPhos  87  12-07    PT/INR - ( 07 Dec 2018 05:22 )   PT: 17.0 sec;   INR: 1.49 ratio         PTT - ( 07 Dec 2018 05:22 )  PTT:26.8 sec    Culture - Blood (12.06.18 @ 07:42)    Gram Stain:   Growth in anaerobic bottle: Gram Variable Rods    -  Escherichia coli: Detec    Specimen Source: .Blood Blood-Peripheral    Organism: Blood Culture PCR    Culture Results:   Growth in anaerobic bottle: Gram Variable Rods  "Due to technical problems, Proteus sp. will Not be reported as part of  the BCID panel until further notice"  ***Blood Panel PCR results on this specimen are available  approximately 3 hours after the Gram stain result.***  Gram stain, PCR, and/or culture results may not always  correspond due to difference in methodologies.  ************************************************************  This PCR assay was performed using [employer].  The following targets are tested for: Enterococcus,  vancomycin resistant enterococci, Listeria monocytogenes,  coagulase negative staphylococci, S. aureus,  methicillin resistant S. aureus, Streptococcus agalactiae  (Group B), S. pneumoniae, S. pyogenes (Group A),  Acinetobacter baumannii, Enterobacter cloacae, E. coli,  Klebsiella oxytoca, K. pneumoniae, Proteus sp.,  Serratia marcescens, Haemophilus influenzae,  Neisseria meningitidis, Pseudomonas aeruginosa, Candida  albicans, C. glabrata, C krusei, C parapsilosis,  C. tropicalis and the KPC resistance gene.    Organism Identification: Blood Culture PCR    Method Type: PCR    Culture - Blood (12.06.18 @ 07:42)    Gram Stain:   Growth in anaerobic bottle: Gram Negative Rods  Growth in aerobic bottle: Gram Negative Rods    Specimen Source: .Blood Blood-Peripheral    Culture Results:   Growth in anaerobic bottle: Gram Negative Rods  Growth in aerobic bottle: Gram Negative Rods          I&O's Detail    06 Dec 2018 07:01  -  07 Dec 2018 07:00  --------------------------------------------------------  IN:  Total IN: 0 mL    OUT:    Voided: 900 mL  Total OUT: 900 mL    Total NET: -900 mL          Impression: 62y Male admitted with Syncope and collapse    PMH GERD with esophagitis  Hepatic encephalopathy  Obesity  Fatty liver disease, nonalcoholic  Renal stones  Hypertension  Neuropathy  Hypercholesteremia  Diabetes      Plan:  -OOB, ambulate  -Continue antibiotics-Zosyn  -Continue current diet-Consistent carbohydrate  -Continue current medications as per medicine  -Hypokalemia- being given oral potassium supplements  -Will discuss case with Dr. Hernandez

## 2018-12-07 NOTE — PROGRESS NOTE ADULT - PROBLEM SELECTOR PLAN 4
febrile overnight  bld cxs growing gram variable/gram neg rods  ucx pending  f/u repeat bld cxs  started on zosyn  f/u id recs  may need repeat abd imaging  further plan of care to be dw attg, will follow

## 2018-12-07 NOTE — PROGRESS NOTE ADULT - PROBLEM SELECTOR PLAN 1
- Patient afebrile overnight- Tylenol prn for fevers, AVOID NSAIDS.  Blood Cx - positive for "Gram negative rods & Gram variable rods." Started on Zosyn IVPB 3.375g q8h.   , UA, C/s, CXR,   ID Dr Rios called , d/w - Patient afebrile overnight- Tylenol prn for fevers, AVOID NSAIDS.  Blood Cx - positive for "Gram negative rods & Gram variable rods." Started on Zosyn IVPB 3.375g q8h.   UA/UC - neg  Chest Xray - neg  ID Dr Rios called , d/w - Patient afebrile overnight- Tylenol prn for fevers, AVOID NSAIDS.  Blood Cx - positive for "Gram negative rods & Gram variable rods." Started on Zosyn IVPB 3.375g q8h.   UA/UC - neg  Chest Xray - neg  ID Dr Rios following- recommends to continue zosyn - Patient afebrile overnight- Tylenol prn for fevers, AVOID NSAIDS.  Blood Cx - positive for "Gram negative rods & Gram variable rods." Started on Zosyn IVPB 3.375g q8h. By ID Dr Rios, Repeat Blood c/s x2  UA/UC - neg  Chest Xray - neg  ID Dr Rios following- recommends to continue zosyn

## 2018-12-07 NOTE — PROGRESS NOTE ADULT - SUBJECTIVE AND OBJECTIVE BOX
INTERVAL HPI/OVERNIGHT EVENTS:  pt seen and examined  denies n/v/d/abd pain  per overnight rn pt febrile, no vomiting no diarrhea no s/s active gib  labs noted    MEDICATIONS  (STANDING):  atorvastatin 10 milliGRAM(s) Oral at bedtime  dextrose 5%. 1000 milliLiter(s) (50 mL/Hr) IV Continuous <Continuous>  dextrose 50% Injectable 12.5 Gram(s) IV Push once  dextrose 50% Injectable 25 Gram(s) IV Push once  dextrose 50% Injectable 25 Gram(s) IV Push once  gabapentin 200 milliGRAM(s) Oral four times a day  insulin glargine Injectable (LANTUS) 50 Unit(s) SubCutaneous at bedtime  insulin lispro (HumaLOG) corrective regimen sliding scale   SubCutaneous three times a day before meals  insulin lispro (HumaLOG) corrective regimen sliding scale   SubCutaneous at bedtime  lactulose Syrup 15 Gram(s) Oral two times a day  pantoprazole    Tablet 40 milliGRAM(s) Oral two times a day  piperacillin/tazobactam IVPB. 3.375 Gram(s) IV Intermittent every 8 hours  potassium chloride    Tablet ER 40 milliEquivalent(s) Oral every 4 hours  rifaximin 550 milliGRAM(s) Oral two times a day  sucralfate 1 Gram(s) Oral every 6 hours  ursodiol Capsule 300 milliGRAM(s) Oral three times a day    MEDICATIONS  (PRN):  acetaminophen   Tablet .. 650 milliGRAM(s) Oral every 6 hours PRN Temp greater or equal to 38C (100.4F)  dextrose 40% Gel 15 Gram(s) Oral once PRN Blood Glucose LESS THAN 70 milliGRAM(s)/deciliter  glucagon  Injectable 1 milliGRAM(s) IntraMuscular once PRN Glucose LESS THAN 70 milligrams/deciliter  lidocaine 2% Gel 1 Application(s) Topical every 3 hours PRN pain      Allergies    codeine (Anaphylaxis)    Intolerances        Review of Systems:    General:  fever  Eyes:  Good vision, no reported pain  ENT:  No sore throat, pain, runny nose, dysphagia  CV:  No pain, palpitations, hypo/hypertension  Resp:  No dyspnea, cough, tachypnea, wheezing  GI:  No pain, No nausea, No vomiting, No diarrhea, No constipation, No weight loss, No fever, No pruritis, No rectal bleeding, No melena, No dysphagia  :  No pain, bleeding, incontinence, nocturia  Muscle:  No pain, weakness  Neuro:  No weakness, tingling, memory problems  Psych:  No fatigue, insomnia, mood problems, depression  Endocrine:  No polyuria, polydypsia, cold/heat intolerance  Heme:  No petechiae, ecchymosis, easy bruisability  Skin:  No rash, tattoos, scars, edema      Vital Signs Last 24 Hrs  T(C): 37.2 (07 Dec 2018 08:00), Max: 38.1 (06 Dec 2018 20:32)  T(F): 98.9 (07 Dec 2018 08:00), Max: 100.5 (06 Dec 2018 20:32)  HR: 83 (07 Dec 2018 08:00) (83 - 102)  BP: 108/61 (07 Dec 2018 08:00) (100/55 - 127/65)  BP(mean): --  RR: 17 (07 Dec 2018 08:00) (17 - 18)  SpO2: 95% (07 Dec 2018 08:00) (94% - 99%)    PHYSICAL EXAM:    Constitutional: NAD   HEENT: ncat  Neck: No LAD  Respiratory: dec bs  Cardiovascular: S1 and S2, RRR  Gastrointestinal: obese abd soft nt mild dt  Extremities: No peripheral edema  Vascular: 2+ peripheral pulses  Neurological: awake alert responds appropriately   Skin: mild jaundice      LABS:                        7.9    9.11  )-----------( 112      ( 07 Dec 2018 05:22 )             23.5     12-    134<L>  |  100  |  28<H>  ----------------------------<  80  3.2<L>   |  28  |  1.30    Ca    7.0<L>      07 Dec 2018 05:22  Phos  2.8     12-  Mg     1.7     12-    TPro  4.7<L>  /  Alb  2.0<L>  /  TBili  2.9<H>  /  DBili  x   /  AST  34  /  ALT  23  /  AlkPhos  87  12-07    PT/INR - ( 07 Dec 2018 05:22 )   PT: 17.0 sec;   INR: 1.49 ratio         PTT - ( 07 Dec 2018 05:22 )  PTT:26.8 sec  Urinalysis Basic - ( 06 Dec 2018 22:55 )    Color: Yellow / Appearance: Clear / S.010 / pH: x  Gluc: x / Ketone: Negative  / Bili: Small / Urobili: 4   Blood: x / Protein: 25 mg/dL / Nitrite: Negative   Leuk Esterase: Negative / RBC: 3-5 /HPF / WBC 3-5   Sq Epi: x / Non Sq Epi: Occasional / Bacteria: x        RADIOLOGY & ADDITIONAL TESTS:

## 2018-12-07 NOTE — PROGRESS NOTE ADULT - ASSESSMENT
63 yo man w JEAN/NAFLD assoc cirrhosis, baseline bilirubin 2.7, adm after near syncope and found  anemia with GI bleed due to large duodenal ulcer; s/p EGD and injection of ulcer with epinephrine  Also history hx of coagulopathy due to cirrhosis and baseline thrombocytopenia with cirrhosis (platelet count usually 90K, INR 1.6).     -CBC stable but intermittently declines. Transfusion requirements to increased Hgb may be larger due to body weight.   -B12/folate/iron  adequate  -stable mild incr INR due to liver disease  -symptomatic management from Heme standpoint, continue monitor serial CBC  -transfusion support PRN prior to procedures , keep Hgb above 7    Hgb 8.1 today. Monitor for now. Check CBC in AM.

## 2018-12-07 NOTE — PROGRESS NOTE ADULT - PROBLEM SELECTOR PLAN 5
-A1C -improved, still elevated  -Hypoglycemia Protocol, Low Dose Insulin Sliding Coverage, Lantus 50 units qhs, Accu-Cheks AC&HS.  -Diet: Regular diet

## 2018-12-07 NOTE — PROGRESS NOTE ADULT - PROBLEM SELECTOR PLAN 4
-- Coagulopathy 2/2 Liver disease/ Cirrhosis  Abnormal LFT's , Bili & PT/PTT/INR - Dr Rios -Hem Follow up  - pt follows GI (Dr. Morales)   -cont ppi, sandra, rifaximin bid

## 2018-12-08 DIAGNOSIS — R78.81 BACTEREMIA: ICD-10-CM

## 2018-12-08 LAB
-  AMIKACIN: SIGNIFICANT CHANGE UP
-  AMPICILLIN/SULBACTAM: SIGNIFICANT CHANGE UP
-  AMPICILLIN: SIGNIFICANT CHANGE UP
-  AZTREONAM: SIGNIFICANT CHANGE UP
-  CEFAZOLIN: SIGNIFICANT CHANGE UP
-  CEFEPIME: SIGNIFICANT CHANGE UP
-  CEFOXITIN: SIGNIFICANT CHANGE UP
-  CEFTRIAXONE: SIGNIFICANT CHANGE UP
-  CIPROFLOXACIN: SIGNIFICANT CHANGE UP
-  ERTAPENEM: SIGNIFICANT CHANGE UP
-  GENTAMICIN: SIGNIFICANT CHANGE UP
-  IMIPENEM: SIGNIFICANT CHANGE UP
-  LEVOFLOXACIN: SIGNIFICANT CHANGE UP
-  MEROPENEM: SIGNIFICANT CHANGE UP
-  PIPERACILLIN/TAZOBACTAM: SIGNIFICANT CHANGE UP
-  TOBRAMYCIN: SIGNIFICANT CHANGE UP
-  TRIMETHOPRIM/SULFAMETHOXAZOLE: SIGNIFICANT CHANGE UP
ALBUMIN SERPL ELPH-MCNC: 1.8 G/DL — LOW (ref 3.3–5)
ALP SERPL-CCNC: 99 U/L — SIGNIFICANT CHANGE UP (ref 40–120)
ALT FLD-CCNC: 21 U/L — SIGNIFICANT CHANGE UP (ref 12–78)
AMMONIA BLD-MCNC: 69 UMOL/L — HIGH (ref 11–32)
ANION GAP SERPL CALC-SCNC: 7 MMOL/L — SIGNIFICANT CHANGE UP (ref 5–17)
AST SERPL-CCNC: 28 U/L — SIGNIFICANT CHANGE UP (ref 15–37)
BASOPHILS # BLD AUTO: 0.02 K/UL — SIGNIFICANT CHANGE UP (ref 0–0.2)
BASOPHILS NFR BLD AUTO: 0.2 % — SIGNIFICANT CHANGE UP (ref 0–2)
BILIRUB SERPL-MCNC: 1.9 MG/DL — HIGH (ref 0.2–1.2)
BUN SERPL-MCNC: 20 MG/DL — SIGNIFICANT CHANGE UP (ref 7–23)
CALCIUM SERPL-MCNC: 6.9 MG/DL — LOW (ref 8.5–10.1)
CHLORIDE SERPL-SCNC: 103 MMOL/L — SIGNIFICANT CHANGE UP (ref 96–108)
CO2 SERPL-SCNC: 27 MMOL/L — SIGNIFICANT CHANGE UP (ref 22–31)
CREAT SERPL-MCNC: 1.1 MG/DL — SIGNIFICANT CHANGE UP (ref 0.5–1.3)
CULTURE RESULTS: NO GROWTH — SIGNIFICANT CHANGE UP
CULTURE RESULTS: SIGNIFICANT CHANGE UP
EOSINOPHIL # BLD AUTO: 0.32 K/UL — SIGNIFICANT CHANGE UP (ref 0–0.5)
EOSINOPHIL NFR BLD AUTO: 3.9 % — SIGNIFICANT CHANGE UP (ref 0–6)
GLUCOSE SERPL-MCNC: 105 MG/DL — HIGH (ref 70–99)
HCT VFR BLD CALC: 22.6 % — LOW (ref 39–50)
HGB BLD-MCNC: 7.5 G/DL — LOW (ref 13–17)
IMM GRANULOCYTES NFR BLD AUTO: 1.8 % — HIGH (ref 0–1.5)
LYMPHOCYTES # BLD AUTO: 1.45 K/UL — SIGNIFICANT CHANGE UP (ref 1–3.3)
LYMPHOCYTES # BLD AUTO: 17.7 % — SIGNIFICANT CHANGE UP (ref 13–44)
MAGNESIUM SERPL-MCNC: 1.8 MG/DL — SIGNIFICANT CHANGE UP (ref 1.6–2.6)
MCHC RBC-ENTMCNC: 30 PG — SIGNIFICANT CHANGE UP (ref 27–34)
MCHC RBC-ENTMCNC: 33.2 GM/DL — SIGNIFICANT CHANGE UP (ref 32–36)
MCV RBC AUTO: 90.4 FL — SIGNIFICANT CHANGE UP (ref 80–100)
METHOD TYPE: SIGNIFICANT CHANGE UP
MONOCYTES # BLD AUTO: 1 K/UL — HIGH (ref 0–0.9)
MONOCYTES NFR BLD AUTO: 12.2 % — SIGNIFICANT CHANGE UP (ref 2–14)
NEUTROPHILS # BLD AUTO: 5.23 K/UL — SIGNIFICANT CHANGE UP (ref 1.8–7.4)
NEUTROPHILS NFR BLD AUTO: 64.2 % — SIGNIFICANT CHANGE UP (ref 43–77)
ORGANISM # SPEC MICROSCOPIC CNT: SIGNIFICANT CHANGE UP
PHOSPHATE SERPL-MCNC: 2.6 MG/DL — SIGNIFICANT CHANGE UP (ref 2.5–4.5)
PLATELET # BLD AUTO: 114 K/UL — LOW (ref 150–400)
POTASSIUM SERPL-MCNC: 3.9 MMOL/L — SIGNIFICANT CHANGE UP (ref 3.5–5.3)
POTASSIUM SERPL-SCNC: 3.9 MMOL/L — SIGNIFICANT CHANGE UP (ref 3.5–5.3)
PROT SERPL-MCNC: 4.6 G/DL — LOW (ref 6–8.3)
RBC # BLD: 2.5 M/UL — LOW (ref 4.2–5.8)
RBC # FLD: 17.7 % — HIGH (ref 10.3–14.5)
SODIUM SERPL-SCNC: 137 MMOL/L — SIGNIFICANT CHANGE UP (ref 135–145)
SPECIMEN SOURCE: SIGNIFICANT CHANGE UP
WBC # BLD: 8.17 K/UL — SIGNIFICANT CHANGE UP (ref 3.8–10.5)
WBC # FLD AUTO: 8.17 K/UL — SIGNIFICANT CHANGE UP (ref 3.8–10.5)

## 2018-12-08 RX ADMIN — Medication 1 GRAM(S): at 00:02

## 2018-12-08 RX ADMIN — Medication 1 GRAM(S): at 06:18

## 2018-12-08 RX ADMIN — PIPERACILLIN AND TAZOBACTAM 25 GRAM(S): 4; .5 INJECTION, POWDER, LYOPHILIZED, FOR SOLUTION INTRAVENOUS at 13:04

## 2018-12-08 RX ADMIN — GABAPENTIN 200 MILLIGRAM(S): 400 CAPSULE ORAL at 00:02

## 2018-12-08 RX ADMIN — URSODIOL 300 MILLIGRAM(S): 250 TABLET, FILM COATED ORAL at 21:51

## 2018-12-08 RX ADMIN — URSODIOL 300 MILLIGRAM(S): 250 TABLET, FILM COATED ORAL at 11:35

## 2018-12-08 RX ADMIN — PANTOPRAZOLE SODIUM 40 MILLIGRAM(S): 20 TABLET, DELAYED RELEASE ORAL at 06:18

## 2018-12-08 RX ADMIN — LACTULOSE 15 GRAM(S): 10 SOLUTION ORAL at 17:13

## 2018-12-08 RX ADMIN — Medication 1 GRAM(S): at 17:13

## 2018-12-08 RX ADMIN — PIPERACILLIN AND TAZOBACTAM 25 GRAM(S): 4; .5 INJECTION, POWDER, LYOPHILIZED, FOR SOLUTION INTRAVENOUS at 06:18

## 2018-12-08 RX ADMIN — LACTULOSE 15 GRAM(S): 10 SOLUTION ORAL at 06:18

## 2018-12-08 RX ADMIN — GABAPENTIN 200 MILLIGRAM(S): 400 CAPSULE ORAL at 06:18

## 2018-12-08 RX ADMIN — GABAPENTIN 200 MILLIGRAM(S): 400 CAPSULE ORAL at 23:42

## 2018-12-08 RX ADMIN — PANTOPRAZOLE SODIUM 40 MILLIGRAM(S): 20 TABLET, DELAYED RELEASE ORAL at 17:13

## 2018-12-08 RX ADMIN — ATORVASTATIN CALCIUM 10 MILLIGRAM(S): 80 TABLET, FILM COATED ORAL at 21:51

## 2018-12-08 RX ADMIN — Medication 1 GRAM(S): at 11:36

## 2018-12-08 RX ADMIN — PIPERACILLIN AND TAZOBACTAM 25 GRAM(S): 4; .5 INJECTION, POWDER, LYOPHILIZED, FOR SOLUTION INTRAVENOUS at 21:53

## 2018-12-08 RX ADMIN — Medication 1 GRAM(S): at 23:42

## 2018-12-08 RX ADMIN — Medication 2: at 16:47

## 2018-12-08 RX ADMIN — GABAPENTIN 200 MILLIGRAM(S): 400 CAPSULE ORAL at 17:13

## 2018-12-08 RX ADMIN — INSULIN GLARGINE 50 UNIT(S): 100 INJECTION, SOLUTION SUBCUTANEOUS at 21:51

## 2018-12-08 RX ADMIN — URSODIOL 300 MILLIGRAM(S): 250 TABLET, FILM COATED ORAL at 06:18

## 2018-12-08 RX ADMIN — Medication 2: at 12:38

## 2018-12-08 RX ADMIN — GABAPENTIN 200 MILLIGRAM(S): 400 CAPSULE ORAL at 11:35

## 2018-12-08 NOTE — PROGRESS NOTE ADULT - PROBLEM SELECTOR PLAN 7
2/2 Cirrhosis -ammonia level improved ? 2/2 GI Bleed ; Pt not confused at baseline MS, no asterixis  - Ammonia level stable  on lactulose 2x day    rifaximin 550 BID

## 2018-12-08 NOTE — PROGRESS NOTE ADULT - SUBJECTIVE AND OBJECTIVE BOX
Patient is a 63y old  Male who presents with a chief complaint of vomiting and near syncope (08 Dec 2018 13:22)      INTERVAL HPI:      OVERNIGHT EVENTS:    Home Medications:  Bacid (LAC) oral tablet: 1 tab(s) orally 2 times a day for  2weeks (2018 19:41)  HumaLOG 100 units/mL subcutaneous solution: 20 unit(s) subcutaneous 2 times a day(breakfast and lunch) (2018 19:41)  HumaLOG 100 units/mL subcutaneous solution: 30 unit(s) subcutaneous once a day(@dinner) (2018 19:41)  irbesartan 300 mg oral tablet: 1 tab(s) orally once a day (2018 19:41)  lactulose 10 g/15 mL oral syrup: 15 milliliter(s) orally 3 times a day (2018 19:41)  Lasix 20 mg oral tablet: 1 tab(s) orally once a day (2018 19:41)  pantoprazole 40 mg oral delayed release tablet: 1 tab(s) orally once a day (2018 19:41)  phytonadione 100 mcg oral tablet: 1 tab(s) orally once a day (2018 19:41)  potassium chloride 10 mEq oral tablet, extended release: 1 tab(s) orally once a day (2018 19:41)  pravastatin 40 mg oral tablet: 1 tab(s) orally once a day (2018 19:41)  Vitamin D3 2000 intl units oral tablet: 1 tab(s) orally once a day (2018 19:41)      MEDICATIONS  (STANDING):  atorvastatin 10 milliGRAM(s) Oral at bedtime  dextrose 5%. 1000 milliLiter(s) (50 mL/Hr) IV Continuous <Continuous>  dextrose 50% Injectable 12.5 Gram(s) IV Push once  dextrose 50% Injectable 25 Gram(s) IV Push once  dextrose 50% Injectable 25 Gram(s) IV Push once  gabapentin 200 milliGRAM(s) Oral four times a day  insulin glargine Injectable (LANTUS) 50 Unit(s) SubCutaneous at bedtime  insulin lispro (HumaLOG) corrective regimen sliding scale   SubCutaneous three times a day before meals  insulin lispro (HumaLOG) corrective regimen sliding scale   SubCutaneous at bedtime  lactulose Syrup 15 Gram(s) Oral two times a day  pantoprazole    Tablet 40 milliGRAM(s) Oral two times a day  piperacillin/tazobactam IVPB. 3.375 Gram(s) IV Intermittent every 8 hours  rifaximin 550 milliGRAM(s) Oral two times a day  sucralfate 1 Gram(s) Oral every 6 hours  ursodiol Capsule 300 milliGRAM(s) Oral three times a day    MEDICATIONS  (PRN):  acetaminophen   Tablet .. 650 milliGRAM(s) Oral every 6 hours PRN Temp greater or equal to 38C (100.4F)  aluminum hydroxide/magnesium hydroxide/simethicone Suspension 30 milliLiter(s) Oral every 6 hours PRN Dyspepsia  dextrose 40% Gel 15 Gram(s) Oral once PRN Blood Glucose LESS THAN 70 milliGRAM(s)/deciliter  glucagon  Injectable 1 milliGRAM(s) IntraMuscular once PRN Glucose LESS THAN 70 milligrams/deciliter  lidocaine 2% Gel 1 Application(s) Topical every 3 hours PRN pain  simethicone 80 milliGRAM(s) Chew every 6 hours PRN Gas      Allergies    codeine (Anaphylaxis)    Intolerances        REVIEW OF SYSTEMS:  CONSTITUTIONAL: No fever, No chills, No fatigue, No myalgia, No Body ache, No Weakness  EYES: No eye pain,  No visual disturbances, No discharge, NO Redness  ENMT:  No ear pain, No nose bleed, No vertigo; No sinus or throat pain, No Congestion  NECK: No pain, No stiffness  RESPIRATORY: No cough, wheezing, No  hemoptysis, No shortness of breath  CARDIOVASCULAR: No chest pain, palpitations  GASTROINTESTINAL: No abdominal or epigastric pain. No nausea, No vomiting; No diarrhea or constipation. [  ] BM  GENITOURINARY: No dysuria, No frequency, No urgency, No hematuria, or incontinence  NEUROLOGICAL: No headaches, No dizziness, No numbness, No tingling, No tremors, No weakness  EXT: No Swelling, No Pain, No Edema  SKIN:  [  ] No itching, burning, rashes, or lesions   MUSCULOSKELETAL: No joint pain or swelling; No muscle pain, No back pain, No extremity pain  PSYCHIATRIC: No depression, anxiety, mood swings or difficulty sleeping at night  PAIN SCALE: [  ] None  [  ] Other-  ROS Unable to obtain due to - [  ] Dementia  [  ] Lethargy  [  ] Sedated [  ] non verbal  REST OF REVIEW Of SYSTEM - [  ] Normal     Vital Signs Last 24 Hrs  T(C): 36.8 (08 Dec 2018 11:45), Max: 37.3 (07 Dec 2018 21:39)  T(F): 98.2 (08 Dec 2018 11:45), Max: 99.2 (07 Dec 2018 21:39)  HR: 74 (08 Dec 2018 11:45) (74 - 91)  BP: 98/62 (08 Dec 2018 11:45) (98/62 - 139/69)  BP(mean): --  RR: 16 (08 Dec 2018 11:45) (16 - 19)  SpO2: 98% (08 Dec 2018 11:45) (96% - 100%)  Finger Stick         @ 07:01  -  12- @ 07:00  --------------------------------------------------------  IN: 400 mL / OUT: 1950 mL / NET: -1550 mL        PHYSICAL EXAM:  GENERAL:  [  ] NAD , [  ] well appearing, [  ] Agitated, [  ] Drowsy,  [  ] Lethargy, [  ] confused   HEAD:  [  ] Normal, [  ] Other  EYES:  [  ] EOMI, [  ] PERRLA, [  ] conjunctiva and sclera clear normal, [  ] Other,  [  ] Pallor,[  ] Discharge  ENMT:  [  ] Normal, [  ] Moist mucous membranes, [  ] Good dentition, [  ] No Thrush  NECK:  [  ] Supple, [  ] No JVD, [  ] Normal thyroid, [  ] Lymphadenopathy [  ] Other  CHEST/LUNG:  [  ] Clear to auscultation bilaterally, [  ] Breath Sounds equal OR Decrease,  [  ] No rales, [  ] No rhonchi  [  ]  No wheezing  HEART:  [  ] Regular rate and rhythm, [  ] tachycardia, [  ] Bradycardia,  [  ] irregular  [  ] No murmurs, No rubs, No gallops, [  ] PPM in place (Mfr:  )  ABDOMEN:  [  ] Soft, [  ] Nontender, [  ] Nondistended, [  ] No mass, [  ] Bowel sounds present, [  ] obese  NERVOUS SYSTEM:  [  ] Alert & Oriented X3, [  ] Nonfocal  [  ] Confusion  [  ] Encephalopathic [  ] Sedated [  ] Unable to assess, [  ] Other-  EXTREMITIES: [  ] 2+ Peripheral Pulses, No clubbing, No cyanosis,  [  ] edema B/L lower EXT. [  ] PVD stasis skin changes B/L Lower EXT  LYMPH: No lymphadenopathy noted  SKIN:  [  ] No rashes or lesions, [  ] Pressure Ulcers, [  ] ecchymosis, [  ] Skin Tears, [  ] Other    DIET:     LABS:                        7.5    8.17  )-----------( 114      ( 08 Dec 2018 05:45 )             22.6     08 Dec 2018 05:45    137    |  103    |  20     ----------------------------<  105    3.9     |  27     |  1.10     Ca    6.9        08 Dec 2018 05:45  Phos  2.6       08 Dec 2018 05:45  Mg     1.8       08 Dec 2018 05:45    TPro  4.6    /  Alb  1.8    /  TBili  1.9    /  DBili  x      /  AST  28     /  ALT  21     /  AlkPhos  99     08 Dec 2018 05:45    PT/INR - ( 07 Dec 2018 05:22 )   PT: 17.0 sec;   INR: 1.49 ratio         PTT - ( 07 Dec 2018 05:22 )  PTT:26.8 sec  Urinalysis Basic - ( 06 Dec 2018 22:55 )    Color: Yellow / Appearance: Clear / S.010 / pH: x  Gluc: x / Ketone: Negative  / Bili: Small / Urobili: 4   Blood: x / Protein: 25 mg/dL / Nitrite: Negative   Leuk Esterase: Negative / RBC: 3-5 /HPF / WBC 3-5   Sq Epi: x / Non Sq Epi: Occasional / Bacteria: x        Culture Results:   No growth to date. ( @ 07:29)  Culture Results:   Growth in aerobic and anaerobic bottles: Escherichia coli ( @ 07:42)  Culture Results:   Growth in anaerobic bottle: Escherichia coli  See previous culture [id number]  "Due to technical problems, Proteus sp. will Not be reported as part of  the BCID panel until further notice"  ***Blood Panel PCR results on this specimen are available  approximately 3 hours after the Gram stain result.***  Gram stain, PCR, and/or culture results may not always  correspond due to difference in methodologies.  ************************************************************  This PCR assay was performed using [employer].  The following targets are tested for: Enterococcus,  vancomycin resistant enterococci, Listeria monocytogenes,  coagulase negative staphylococci, S. aureus,  methicillin resistant S. aureus, Streptococcus agalactiae  (Group B), S. pneumoniae, S. pyogenes (Group A),  Acinetobacter baumannii, Enterobacter cloacae, E. coli,  Klebsiella oxytoca, K. pneumoniae, Proteus sp.,  Serratia marcescens, Haemophilus influenzae,  Neisseria meningitidis, Pseudomonas aeruginosa, Candida  albicans, C. glabrata, C krusei, C parapsilosis,  C. tropicalis and the KPC resistance gene. ( @ 07:42)      culture blood  -- .Blood Blood  @ 07:29    culture urine  --   @ 07:29      CARDIAC MARKERS ( 02 Dec 2018 08:40 )  x     / x     / 504 U/L / x     / x              Culture - Blood (collected 07 Dec 2018 07:29)  Source: .Blood Blood  Preliminary Report (08 Dec 2018 08:01):    No growth to date.    Culture - Blood (collected 06 Dec 2018 07:42)  Source: .Blood Blood-Peripheral  Gram Stain (06 Dec 2018 19:53):    Growth in anaerobic bottle: Gram Variable Rods  Preliminary Report (07 Dec 2018 18:22):    Growth in anaerobic bottle: Escherichia coli    See previous culture [id number]    "Due to technical problems, Proteus sp. will Not be reported as part of    the BCID panel until further notice"    ***Blood Panel PCR results on this specimen are available    approximately 3 hours after the Gram stain result.***    Gram stain, PCR, and/or culture results may not always    correspond due to difference in methodologies.    ************************************************************    This PCR assay was performed using [employer].    The following targets are tested for: Enterococcus,    vancomycin resistant enterococci, Listeria monocytogenes,    coagulase negative staphylococci, S. aureus,    methicillin resistant S. aureus, Streptococcus agalactiae    (Group B), S. pneumoniae, S. pyogenes (Group A),    Acinetobacter baumannii, Enterobacter cloacae, E. coli,    Klebsiella oxytoca, K. pneumoniae, Proteus sp.,    Serratia marcescens, Haemophilus influenzae,    Neisseria meningitidis, Pseudomonas aeruginosa, Candida    albicans, C. glabrata, C krusei, C parapsilosis,    C. tropicalis and the KPC resistance gene.  Organism: Blood Culture PCR (06 Dec 2018 23:44)  Organism: Blood Culture PCR (06 Dec 2018 23:44)    Culture - Blood (collected 06 Dec 2018 07:42)  Source: .Blood Blood-Peripheral  Gram Stain (06 Dec 2018 20:52):    Growth in anaerobic bottle: Gram Negative Rods    Growth in aerobic bottle: Gram Negative Rods  Preliminary Report (07 Dec 2018 18:18):    Growth in aerobic and anaerobic bottles: Escherichia coli         Anemia Panel:  Ferritin, Serum: 127 ng/mL (18 @ 08:01)  Iron Total, Serum: 74 ug/dL (18 @ 08:01)  Iron - Total Binding Capacity.: 172 ug/dL (18 @ 08:01)  Vitamin B12, Serum: 915 pg/mL (18 @ 08:01)  Folate, Serum: 8.0 ng/mL (18 @ 08:01)  Absolute Reticulocytes: 107.1 K/uL (18 @ 06:24)      Thyroid Panel:    RADIOLOGY & ADDITIONAL TESTS:      HEALTH ISSUES - PROBLEM Dx:  Fever: Fever  Anemia: Anemia  MISA (acute kidney injury): MISA (acute kidney injury)  GIB (gastrointestinal bleeding): GIB (gastrointestinal bleeding)  Lactic acid blood increased: Lactic acid blood increased  Prophylactic measure: Prophylactic measure  Pedal edema: Pedal edema  Neuropathy: Neuropathy  Hypercholesteremia: Hypercholesteremia  Hypertension: Hypertension  Fatty liver disease, nonalcoholic: Fatty liver disease, nonalcoholic  Hepatic encephalopathy: Hepatic encephalopathy  Diabetes: Diabetes  Near syncope: Near syncope  Vomiting: Vomiting          Consultant(s) Notes Reviewed:  [  ] YES     Care Discussed with [X] Consultants  [  ] Patient  [  ] Family  [  ]   [  ] Social Service  [  ] RN, [  ] Physical Therapy  DVT PPX: [  ] Lovenox, [  ] S C Heparin, [  ] Coumadin, [  ] Xarelto, [  ] Eliquis, [  ] Pradaxa, [  ] IV Heparin drip, [  ] SCD [  ] Contraindication 2 to GI Bleed,[  ] Ambulation  Advanced directive: [  ] None, [  ] DNR/DNI Patient is a 63y old  Male who presents with a chief complaint of vomiting and near syncope (08 Dec 2018 13:22)      INTERVAL HPI:  Pt is a 61 y/o M with PMHx of Type 2 DM, HTN, HLD, obesity, nephrolithiasis (last stone 25 years ago), neuropathy, HE, NAFLD, Hyper ammonemia, recent Gastritis/ esophagitis on EGD & Gram variable teddy bacteremia in 10/2018  who presents with vomiting and  near syncope/falling out of a moving car. Pt states that [place] and felt "off", his vision was cloudy, he pulled over the car and stopped and vomited. Vomitus was "creamy white." Pt denies seeing blood in vomitus. His dtr then took over driving and Pt again felt like he needed to vomit and told dtr to pull over. He thought the car had stopped and he opened the door and fell out of the moving car. He laid on the ground for ~ 15 minutes. Pt says he possibly lost consciousness. States when he was lying on the ground felt very cold. Pt has had "terrible heartburn" for 2 days and felt chills for 2 days. Pt denies abdominal pain, CP, palp, SOB, cough, myalgias, rash, HA. Pt admits to chills, vomiting (4 episodes), diarrhea (from lactulose), dizziness, pedal edema, tingling in hands (chronic, intermittent). Pt denies recent travel, sick contacts, different eating patterns.   Pt states that early this week pt saw her Endocrinologist & started back on Metformin 500 mg daily as per Dr's recommendation.  In ED Pt's vitals were: T(C): 36.3, HR: 91, BP: 85/59, RR: 15, SpO2: 98% on RA  Labs significant for lactate 5.6, , Hgb 10.4, total bili 3.7, alk phos 183, ammonia 72, Mg 1.4. UA - trace LE, mod blood, RBC 6-10, urine glucose 1,000.   CXR - negative chest  Abd Xray - There is mild content in the colon and no overt sense of bowel obstruction.  CT head - no intracranial hemorrhage or mass effect  CT cervical - Multilevel degenerative disc disease/cervical spondylosis, with posterior osteophyte disc complex C6-7 resulting in flattening of the cervical cord. If there is a clinical concern for spinal cord injury, recommend further evaluation with MRI if there are no clinical contraindications.  EKG - NSR, 93bpm  Pt given 2L NS bolus, Zofran pantoprazole, famotidine, Carafate , Pt seen by GI Dr Morales in ER & was admitted for further Eval.    18: Pt seen, examined, Had total 4 bloody BM so far , Lactate improved, pt had CT A/P with IV Contrast. Drop in H/H. plan for, start Protonix drip,  2 u pRBC 1 u FFP & 5 mg Vit K x 1 as d/w Dr Franks -GI, Elevated ammonia level. Coagulopathy, Improving Lactate. Ac Blood loss Anemia.    18: Pt seen, Examined, pt was transferred to ICU on 18 for symptomatic upper GI Bleed, S/P 7  units pRBC in Total , 3 FFP, 1 platelets transfusion given, Pt had emergent EGD done that found Ac Bleeding Duodenal ulcer found. S/P Epi given. Homeostasis obtained, Pt seen By Sx Dr Hernandez. H/H Stable,  today with MISA, Coagulopathy , Leukocytosis & High Lactate level.  H/H Low stable with Ac Blood  Loss Anemia. Hematology Dr Rios called, as pt is followed by Dr Rios- H/O as out pt.    12/3/18:. Patient denies any acute complaints this morning. Hgb drop from 8.2 --> 7.7. Leukocytosis present at 15.08. Lactate level drop from 3.2 --> 2.5    19: Patient was sitting out of bed in chair, awake, alert, and oriented x3. Hb dropped to 6.6.  Patient states he is feeling better since yesterday. Patient had EGD done yesterday that showed large bleeding Duodenal ulcer s/p clips x2, epi injection, and cautery. Patient was given 3 units of packed red blood cells yesterday. Hbg increased from 7.3 --> 7.8. WBC count drop from 12.53 --> 9.47. Patient tolerating ice chips and clear liquid diet. Patient denies fever, chills, headache, weakness chest pain, palpitations, sob, N/V, diarrhea, constipation, blood in stool, urinary symptoms. Patient admits to feeling fatigued. Has not had a BM today.     18: , patient lying comfortably in bed. Hgb at 7.8. WBC 9.35, patient tolerating clear liquid diet. Patient denies fevers, chills, CP, palpitations, N/V/D. As per nurse, BM are still dark. Patient is asymptomatic at this time. On Regular diet.     18: . Patient lying comfortably in bed. Patient was febrile overnight 102F. Tylenol prn, AVOID NSAIDS (risk of rebleed). Hgb drop 7.8 --> 7.4. repeat H/H 8.6.  WBC 8.63. Creatinine up .98-->1.3. Tolerating regular carbohydrate diet, ate breakfast this morning. States that this was his second meal and has not had any associated N/V. Patient states he had a BM earlier this morning that was normal in consistency without any blood in stool. Patient is asymptomatic at this time. Patients BP is in 100/50's this AM, patient states that his pressure normal runs between "100-110". Patient denies headache, fatigue, weakness, chest pain, sob, palpitations, N/V, abdominal pain, diarrhea, constipation. PT saw aptient today and alerted patient felt light-headed. Will re-assess. ID Dr Rios called. Repeat H/H stable.    18: Patient lying comfortably in bed. Has no acute complaints this morning. Patient was afebrile overnight.  fever last night ,Blood cultures are positive for "Gram negative rods & Gram variable rods." Started on Zosyn IVPB 3.375g q8h.  Hgb at 8.1. K+ drop from 3.6 -->3.1. Repleted with 40 meq Kcl tablet q4h for 2 doses. Patient reports black BM yesterday. No BM this morning. Patient admits to fatigue. Denies headache, fever, chills, SOB, chest pain, palpitations, abdominal pain, diarrhea, constipation, blood in stool, urinary symptoms.   18: Pt seen, Examined, feels tired but better, Blood c/s +E Coli, On IV Zosyn. Low stable H/H.      OVERNIGHT EVENTS: NONE    Home Medications:  Bacid (LAC) oral tablet: 1 tab(s) orally 2 times a day for  2weeks (2018 19:41)  HumaLOG 100 units/mL subcutaneous solution: 20 unit(s) subcutaneous 2 times a day(breakfast and lunch) (2018 19:41)  HumaLOG 100 units/mL subcutaneous solution: 30 unit(s) subcutaneous once a day(@dinner) (2018 19:41)  irbesartan 300 mg oral tablet: 1 tab(s) orally once a day (2018 19:41)  lactulose 10 g/15 mL oral syrup: 15 milliliter(s) orally 3 times a day (2018 19:41)  Lasix 20 mg oral tablet: 1 tab(s) orally once a day (2018 19:41)  pantoprazole 40 mg oral delayed release tablet: 1 tab(s) orally once a day (2018 19:41)  phytonadione 100 mcg oral tablet: 1 tab(s) orally once a day (2018 19:41)  potassium chloride 10 mEq oral tablet, extended release: 1 tab(s) orally once a day (2018 19:41)  pravastatin 40 mg oral tablet: 1 tab(s) orally once a day (2018 19:41)  Vitamin D3 2000 intl units oral tablet: 1 tab(s) orally once a day (2018 19:41)      MEDICATIONS  (STANDING):  atorvastatin 10 milliGRAM(s) Oral at bedtime  dextrose 5%. 1000 milliLiter(s) (50 mL/Hr) IV Continuous <Continuous>  dextrose 50% Injectable 12.5 Gram(s) IV Push once  dextrose 50% Injectable 25 Gram(s) IV Push once  dextrose 50% Injectable 25 Gram(s) IV Push once  gabapentin 200 milliGRAM(s) Oral four times a day  insulin glargine Injectable (LANTUS) 50 Unit(s) SubCutaneous at bedtime  insulin lispro (HumaLOG) corrective regimen sliding scale   SubCutaneous three times a day before meals  insulin lispro (HumaLOG) corrective regimen sliding scale   SubCutaneous at bedtime  lactulose Syrup 15 Gram(s) Oral two times a day  pantoprazole    Tablet 40 milliGRAM(s) Oral two times a day  piperacillin/tazobactam IVPB. 3.375 Gram(s) IV Intermittent every 8 hours  rifaximin 550 milliGRAM(s) Oral two times a day  sucralfate 1 Gram(s) Oral every 6 hours  ursodiol Capsule 300 milliGRAM(s) Oral three times a day    MEDICATIONS  (PRN):  acetaminophen   Tablet .. 650 milliGRAM(s) Oral every 6 hours PRN Temp greater or equal to 38C (100.4F)  aluminum hydroxide/magnesium hydroxide/simethicone Suspension 30 milliLiter(s) Oral every 6 hours PRN Dyspepsia  dextrose 40% Gel 15 Gram(s) Oral once PRN Blood Glucose LESS THAN 70 milliGRAM(s)/deciliter  glucagon  Injectable 1 milliGRAM(s) IntraMuscular once PRN Glucose LESS THAN 70 milligrams/deciliter  lidocaine 2% Gel 1 Application(s) Topical every 3 hours PRN pain  simethicone 80 milliGRAM(s) Chew every 6 hours PRN Gas      Allergies    codeine (Anaphylaxis)    Intolerances    REVIEW OF SYSTEMS: Tired, Weakness  CONSTITUTIONAL: No fever, No chills, No fatigue, No myalgia, No Body ache, No Weakness  EYES: No eye pain,  No visual disturbances, No discharge, NO Redness  ENMT:  No ear pain, No nose bleed, No vertigo; No sinus or throat pain, No Congestion  NECK: No pain, No stiffness  RESPIRATORY: No cough, wheezing, No  hemoptysis, No shortness of breath  CARDIOVASCULAR: No chest pain, palpitations  GASTROINTESTINAL: No abdominal or epigastric pain. No nausea, No vomiting; No diarrhea or constipation. [  ] BM  GENITOURINARY: No dysuria, No frequency, No urgency, No hematuria, or incontinence  NEUROLOGICAL: No headaches, No dizziness, No numbness, No tingling, No tremors, No weakness  EXT: No Swelling, No Pain, No Edema  SKIN:  [x  ] No itching, burning, rashes, or lesions   MUSCULOSKELETAL: No joint pain or swelling; No muscle pain, No back pain, No extremity pain  PSYCHIATRIC: No depression, anxiety, mood swings or difficulty sleeping at night  PAIN SCALE: [ x ] None  [  ] Other-  ROS Unable to obtain due to - [  ] Dementia  [  ] Lethargy  [  ] Sedated [  ] non verbal  REST OF REVIEW Of SYSTEM - [x  ] Normal     Vital Signs Last 24 Hrs  T(C): 36.8 (08 Dec 2018 11:45), Max: 37.3 (07 Dec 2018 21:39)  T(F): 98.2 (08 Dec 2018 11:45), Max: 99.2 (07 Dec 2018 21:39)  HR: 74 (08 Dec 2018 11:45) (74 - 91)  BP: 98/62 (08 Dec 2018 11:45) (98/62 - 139/69)  BP(mean): --  RR: 16 (08 Dec 2018 11:45) (16 - 19)  SpO2: 98% (08 Dec 2018 11:45) (96% - 100%)  Finger Stick        - @ 07:01  -  - @ 07:00  --------------------------------------------------------  IN: 400 mL / OUT: 1950 mL / NET: -1550 mL        PHYSICAL EXAM:  GENERAL:  [ x ] NAD , [ x ] well appearing, [  ] Agitated, [  ] Drowsy,  [  ] Lethargy, [  ] confused   HEAD:  [x  ] Normal, [  ] Other  EYES:  [ x ] EOMI, [ x ] PERRLA, [  ] conjunctiva and sclera clear normal, [  ] Other,  [ x ] Pallor,[  ] Discharge  ENMT:  [ x ] Normal, [ x ] Moist mucous membranes, [ x ] Good dentition, [ x] No Thrush  NECK:  [ x ] Supple, [ x ] No JVD, [x  ] Normal thyroid, [  ] Lymphadenopathy [  ] Other  CHEST/LUNG:  [ x ] Clear to auscultation bilaterally, [ x ] Breath Sounds equal B/L,  [ x ] No rales, [x  ] No rhonchi  [ x ]  No wheezing  HEART:  [x  ] Regular rate and rhythm, [  ] tachycardia, [  ] Bradycardia,  [  ] irregular  [ x ] No murmurs, No rubs, No gallops, [  ] PPM in place (Mfr:  )  ABDOMEN:  [x  ] Soft, [x  ] Nontender, [x  ] Nondistended, [ x ] No mass, [ x ] Bowel sounds present, [x  ] obese  NERVOUS SYSTEM:  [ x ] Alert & Oriented X3, [ x ] Nonfocal  [  ] Confusion  [  ] Encephalopathic [  ] Sedated [  ] Unable to assess, [  ] Other-  EXTREMITIES: [x  ] 2+ Peripheral Pulses, No clubbing, No cyanosis,  [ x ] 1+pitting edema B/L lower EXT. [  ] PVD stasis skin changes B/L Lower EXT  LYMPH: No lymphadenopathy noted  SKIN:  [x  ] No rashes or lesions, [  ] Pressure Ulcers, [  ] ecchymosis, [  ] Skin Tears, [  ] Other    DIET: DM    LABS:                        7.5    8.17  )-----------( 114      ( 08 Dec 2018 05:45 )             22.6     08 Dec 2018 05:45    137    |  103    |  20     ----------------------------<  105    3.9     |  27     |  1.10     Ca    6.9        08 Dec 2018 05:45  Phos  2.6       08 Dec 2018 05:45  Mg     1.8       08 Dec 2018 05:45    TPro  4.6    /  Alb  1.8    /  TBili  1.9    /  DBili  x      /  AST  28     /  ALT  21     /  AlkPhos  99     08 Dec 2018 05:45    PT/INR - ( 07 Dec 2018 05:22 )   PT: 17.0 sec;   INR: 1.49 ratio         PTT - ( 07 Dec 2018 05:22 )  PTT:26.8 sec  Urinalysis Basic - ( 06 Dec 2018 22:55 )    Color: Yellow / Appearance: Clear / S.010 / pH: x  Gluc: x / Ketone: Negative  / Bili: Small / Urobili: 4   Blood: x / Protein: 25 mg/dL / Nitrite: Negative   Leuk Esterase: Negative / RBC: 3-5 /HPF / WBC 3-5   Sq Epi: x / Non Sq Epi: Occasional / Bacteria: x        Culture Results:   No growth to date. ( @ 07:29)  Culture Results:   Growth in aerobic and anaerobic bottles: Escherichia coli ( @ 07:42)  Culture Results:   Growth in anaerobic bottle: Escherichia coli  See previous culture [id number]  "Due to technical problems, Proteus sp. will Not be reported as part of  the BCID panel until further notice"  ***Blood Panel PCR results on this specimen are available  approximately 3 hours after the Gram stain result.***  Gram stain, PCR, and/or culture results may not always  correspond due to difference in methodologies.  ************************************************************  This PCR assay was performed using [employer].  The following targets are tested for: Enterococcus,  vancomycin resistant enterococci, Listeria monocytogenes,  coagulase negative staphylococci, S. aureus,  methicillin resistant S. aureus, Streptococcus agalactiae  (Group B), S. pneumoniae, S. pyogenes (Group A),  Acinetobacter baumannii, Enterobacter cloacae, E. coli,  Klebsiella oxytoca, K. pneumoniae, Proteus sp.,  Serratia marcescens, Haemophilus influenzae,  Neisseria meningitidis, Pseudomonas aeruginosa, Candida  albicans, C. glabrata, C krusei, C parapsilosis,  C. tropicalis and the KPC resistance gene. ( @ 07:42)      culture blood  -- .Blood Blood  @ 07:29    culture urine  --   @ 07:29      CARDIAC MARKERS ( 02 Dec 2018 08:40 )  x     / x     / 504 U/L / x     / x              Culture - Blood (collected 07 Dec 2018 07:29)  Source: .Blood Blood  Preliminary Report (08 Dec 2018 08:01):    No growth to date.    Culture - Blood (collected 06 Dec 2018 07:42)  Source: .Blood Blood-Peripheral  Gram Stain (06 Dec 2018 19:53):    Growth in anaerobic bottle: Gram Variable Rods  Preliminary Report (07 Dec 2018 18:22):    Growth in anaerobic bottle: Escherichia coli    See previous culture [id number]    "Due to technical problems, Proteus sp. will Not be reported as part of    the BCID panel until further notice"    ***Blood Panel PCR results on this specimen are available    approximately 3 hours after the Gram stain result.***    Gram stain, PCR, and/or culture results may not always    correspond due to difference in methodologies.    ************************************************************    This PCR assay was performed using [employer].    The following targets are tested for: Enterococcus,    vancomycin resistant enterococci, Listeria monocytogenes,    coagulase negative staphylococci, S. aureus,    methicillin resistant S. aureus, Streptococcus agalactiae    (Group B), S. pneumoniae, S. pyogenes (Group A),    Acinetobacter baumannii, Enterobacter cloacae, E. coli,    Klebsiella oxytoca, K. pneumoniae, Proteus sp.,    Serratia marcescens, Haemophilus influenzae,    Neisseria meningitidis, Pseudomonas aeruginosa, Candida    albicans, C. glabrata, C krusei, C parapsilosis,    C. tropicalis and the KPC resistance gene.  Organism: Blood Culture PCR (06 Dec 2018 23:44)  Organism: Blood Culture PCR (06 Dec 2018 23:44)    Culture - Blood (collected 06 Dec 2018 07:42)  Source: .Blood Blood-Peripheral  Gram Stain (06 Dec 2018 20:52):    Growth in anaerobic bottle: Gram Negative Rods    Growth in aerobic bottle: Gram Negative Rods  Preliminary Report (07 Dec 2018 18:18):    Growth in aerobic and anaerobic bottles: Escherichia coli         Anemia Panel:  Ferritin, Serum: 127 ng/mL (18 @ 08:01)  Iron Total, Serum: 74 ug/dL (18 @ 08:01)  Iron - Total Binding Capacity.: 172 ug/dL (18 @ 08:01)  Vitamin B12, Serum: 915 pg/mL (18 @ 08:01)  Folate, Serum: 8.0 ng/mL (18 @ 08:01)  Absolute Reticulocytes: 107.1 K/uL (18 @ 06:24)      RADIOLOGY & ADDITIONAL TESTS:NONE      HEALTH ISSUES - PROBLEM Dx:  Fever: Fever  Anemia: Anemia  MISA (acute kidney injury): MISA (acute kidney injury)  GIB (gastrointestinal bleeding): GIB (gastrointestinal bleeding)  Lactic acid blood increased: Lactic acid blood increased  Prophylactic measure: Prophylactic measure  Pedal edema: Pedal edema  Neuropathy: Neuropathy  Hypercholesteremia: Hypercholesteremia  Hypertension: Hypertension  Fatty liver disease, nonalcoholic: Fatty liver disease, nonalcoholic  Hepatic encephalopathy: Hepatic encephalopathy  Diabetes: Diabetes  Near syncope: Near syncope  Vomiting: Vomiting      Consultant(s) Notes Reviewed:  [x  ] YES     Care Discussed with [X] Consultants  [ x ] Patient  [x  ] Family-Wife, Son  [  ]   x[  ] Social Service  [ x ] RN, [  ] Physical Therapy  DVT PPX: [  ] Lovenox, [  ] S C Heparin, [  ] Coumadin, [  ] Xarelto, [  ] Eliquis, [  ] Pradaxa, [  ] IV Heparin drip, [x ] SCD [  ] Contraindication 2 to GI Bleed,[  ] Ambulation  Advanced directive: [x ] None, [  ] DNR/DNI

## 2018-12-08 NOTE — PROGRESS NOTE ADULT - PROBLEM SELECTOR PLAN 1
- S/P fever  Tylenol prn for fevers,   Blood Cx - positive for E Coli , on Zosyn IVPB 3.375g q8h. By ID Dr Rios, Repeat Blood c/s x2  UA/UC - neg  Chest Xray - neg  ID Dr Rios D/W

## 2018-12-08 NOTE — PROGRESS NOTE ADULT - SUBJECTIVE AND OBJECTIVE BOX
INTERVAL HPI/OVERNIGHT EVENTS:  pt seen and examined  denies n/v/d/abd pain  afebrile overnight, no vomiting no diarrhea no s/s active gib  repeat blood culture ngtd     MEDICATIONS  (STANDING):  atorvastatin 10 milliGRAM(s) Oral at bedtime  dextrose 5%. 1000 milliLiter(s) (50 mL/Hr) IV Continuous <Continuous>  dextrose 50% Injectable 12.5 Gram(s) IV Push once  dextrose 50% Injectable 25 Gram(s) IV Push once  dextrose 50% Injectable 25 Gram(s) IV Push once  gabapentin 200 milliGRAM(s) Oral four times a day  insulin glargine Injectable (LANTUS) 50 Unit(s) SubCutaneous at bedtime  insulin lispro (HumaLOG) corrective regimen sliding scale   SubCutaneous three times a day before meals  insulin lispro (HumaLOG) corrective regimen sliding scale   SubCutaneous at bedtime  lactulose Syrup 15 Gram(s) Oral two times a day  pantoprazole    Tablet 40 milliGRAM(s) Oral two times a day  piperacillin/tazobactam IVPB. 3.375 Gram(s) IV Intermittent every 8 hours  potassium chloride    Tablet ER 40 milliEquivalent(s) Oral every 4 hours  rifaximin 550 milliGRAM(s) Oral two times a day  sucralfate 1 Gram(s) Oral every 6 hours  ursodiol Capsule 300 milliGRAM(s) Oral three times a day    MEDICATIONS  (PRN):  acetaminophen   Tablet .. 650 milliGRAM(s) Oral every 6 hours PRN Temp greater or equal to 38C (100.4F)  dextrose 40% Gel 15 Gram(s) Oral once PRN Blood Glucose LESS THAN 70 milliGRAM(s)/deciliter  glucagon  Injectable 1 milliGRAM(s) IntraMuscular once PRN Glucose LESS THAN 70 milligrams/deciliter  lidocaine 2% Gel 1 Application(s) Topical every 3 hours PRN pain      Allergies    codeine (Anaphylaxis)    Intolerances        Review of Systems:    General:  fever  Eyes:  Good vision, no reported pain  ENT:  No sore throat, pain, runny nose, dysphagia  CV:  No pain, palpitations, hypo/hypertension  Resp:  No dyspnea, cough, tachypnea, wheezing  GI:  No pain, No nausea, No vomiting, No diarrhea, No constipation, No weight loss, No fever, No pruritis, No rectal bleeding, No melena, No dysphagia  :  No pain, bleeding, incontinence, nocturia  Muscle:  No pain, weakness  Neuro:  No weakness, tingling, memory problems  Psych:  No fatigue, insomnia, mood problems, depression  Endocrine:  No polyuria, polydypsia, cold/heat intolerance  Heme:  No petechiae, ecchymosis, easy bruisability  Skin:  No rash, tattoos, scars, edema      Vital Signs Last 24 Hrs  T(C): 36.8 (08 Dec 2018 11:45), Max: 37.3 (07 Dec 2018 21:39)  T(F): 98.2 (08 Dec 2018 11:45), Max: 99.2 (07 Dec 2018 21:39)  HR: 74 (08 Dec 2018 11:45) (74 - 91)  BP: 98/62 (08 Dec 2018 11:45) (98/62 - 139/69)  BP(mean): --  RR: 16 (08 Dec 2018 11:45) (16 - 19)  SpO2: 98% (08 Dec 2018 11:45) (96% - 100%)    PHYSICAL EXAM:    Constitutional: NAD   HEENT: ncat  Neck: No LAD  Respiratory: dec bs  Cardiovascular: S1 and S2, RRR  Gastrointestinal: obese abd soft nt mild dt  Extremities: No peripheral edema  Vascular: 2+ peripheral pulses  Neurological: awake alert responds appropriately   Skin: mild jaundice      LABS:                                         7.5    8.17  )-----------( 114      ( 08 Dec 2018 05:45 )             22.6   12-08    137  |  103  |  20  ----------------------------<  105<H>  3.9   |  27  |  1.10    Ca    6.9<L>      08 Dec 2018 05:45  Phos  2.6     12-  Mg     1.8     12-08    TPro  4.6<L>  /  Alb  1.8<L>  /  TBili  1.9<H>  /  DBili  x   /  AST  28  /  ALT  21  /  AlkPhos  99  12-08      PT/INR - ( 07 Dec 2018 05:22 )   PT: 17.0 sec;   INR: 1.49 ratio         PTT - ( 07 Dec 2018 05:22 )  PTT:26.8 sec  Urinalysis Basic - ( 06 Dec 2018 22:55 )    Color: Yellow / Appearance: Clear / S.010 / pH: x  Gluc: x / Ketone: Negative  / Bili: Small / Urobili: 4   Blood: x / Protein: 25 mg/dL / Nitrite: Negative   Leuk Esterase: Negative / RBC: 3-5 /HPF / WBC 3-5   Sq Epi: x / Non Sq Epi: Occasional / Bacteria: x        RADIOLOGY & ADDITIONAL TESTS:

## 2018-12-08 NOTE — PROGRESS NOTE ADULT - ASSESSMENT
Pt is a 63 y/o M with PMHx of Type 2 DM, HTN, HLD, obesity, nephrolithiasis (last stone 25 years ago), neuropathy, HE, NAFLD, who presents with vomiting and near syncope/falling out of a moving car. Admitted for vomiting and near syncope. Likely Vasovagal, Acute blood Loss anemia, S/P ICU transfer for upper GI bleed S/P EGD showed bleeding Duodenal ulcer s/p clips x2, epi injection, and cautery. 3 units pRBC given 3 days ago. Patient afebrile overnight. Hgb at 8.1 Blood cultures are positive for "Gram negative rods & Gram variable rods." Started on Zosyn IVPB 3.375g q8h. E Coli Bacteremia

## 2018-12-08 NOTE — PROGRESS NOTE ADULT - ASSESSMENT
63 yo man w JEAN/NAFLD assoc cirrhosis, baseline bilirubin 2.7, adm after near syncope and found  anemia with GI bleed due to large duodenal ulcer; s/p EGD and injection of ulcer with epinephrine  Also history hx of coagulopathy due to cirrhosis and baseline thrombocytopenia with cirrhosis (platelet count usually 90K, INR 1.6).   Workup adequate B12/folate/iron    -CBC stable but intermittently still declines, may be due to still element of slow/sl blood loss/iatrogenic loss/suboptimal marrow response during stress. Clinically asymptomatic  -continue monitor serial CBC, treat symptomatically from heme standpoint

## 2018-12-08 NOTE — PROGRESS NOTE ADULT - SUBJECTIVE AND OBJECTIVE BOX
pt seen  no complaints  ICU Vital Signs Last 24 Hrs  T(C): 36.9 (08 Dec 2018 08:00), Max: 37.3 (07 Dec 2018 21:39)  T(F): 98.4 (08 Dec 2018 08:00), Max: 99.2 (07 Dec 2018 21:39)  HR: 88 (08 Dec 2018 08:00) (75 - 91)  BP: 139/69 (08 Dec 2018 08:00) (102/59 - 139/69)  BP(mean): --  ABP: --  ABP(mean): --  RR: 19 (08 Dec 2018 08:00) (17 - 19)  SpO2: 97% (08 Dec 2018 08:00) (93% - 100%)  gen-NAD  resp-clear  abd-soft NT/ND                          7.5    8.17  )-----------( 114      ( 08 Dec 2018 05:45 )             22.6

## 2018-12-08 NOTE — PROGRESS NOTE ADULT - PROBLEM SELECTOR PLAN 4
Anemia & Thrombocytopenia- Acute Blood loss Anemia with Bleeding DU - H/H Low stable. Hgb at 7.9 & 8.1 today  Ac Blood loss Anemia, 2/2 Bleeding DU  S/P EGD x 2 by GI Dr Morales   -Pt had Multiple pRBC transfusions , FFP & Platelets transfusions.  - As per Dr. Hernandez recs - consider transfusing to Hgb of 8  Dr Duran's D/W H/H , No Transfusion as Hb > 7

## 2018-12-08 NOTE — PROGRESS NOTE ADULT - SUBJECTIVE AND OBJECTIVE BOX
Neurology Follow up note    JESSICA [id number]    HPI:  Pt is a 63 y/o M with PMHx of Type 2 DM, HTN, HLD, obesity, nephrolithiasis (last stone 25 years ago), neuropathy, HE, NAFLD, Hyper ammonemia, recent Gastritis/ esophagitis on EGD & Gram variable teddy bacteremia in 10/2018  who presents with vomiting and  near syncope/falling out of a moving car. Pt states that [place] and felt "off", his vision was cloudy, he pulled over the car and stopped and vomited. Vomitus was "creamy white." Pt denies seeing blood in vomitus. His dtr then took over driving and Pt again felt like he needed to vomit and told dtr to pull over. He thought the car had stopped and he opened the door and fell out of the moving car. He laid on the ground for ~ 15 minutes. Pt says he possibly lost consciousness. States when he was lying on the ground felt very cold. Pt has had "terrible heartburn" for 2 days and felt chills for 2 days. Pt denies abdominal pain, CP, palp, SOB, cough, myalgias, rash, HA. Pt admits to chills, vomiting (4 episodes), diarrhea (from lactulose), dizziness, pedal edema, tingling in hands (chronic, intermittent). Pt denies recent travel, sick contacts, different eating patterns.    Pt states that early this week pt saw her Endocrinologist & started back on Metformin 500 mg daily as per Dr's recommendation.  In ED Pt's vitals were: T(C): 36.3, HR: 91, BP: 85/59, RR: 15, SpO2: 98% on RA  Labs significant for lactate 5.6, , Hgb 10.4, total bili 3.7, alk phos 183, ammonia 72, Mg 1.4. UA - trace LE, mod blood, RBC 6-10, urine glucose 1,000.   CXR - negative chest  Abd Xray - There is mild content in the colon and no overt sense of bowel obstruction.  CT head - no intracranial hemorrhage or mass effect  CT cervical - Multilevel degenerative disc disease/cervical spondylosis, with posterior osteophyte disc complex C6-7 resulting in flattening of the cervical cord. If there is a clinical concern for spinal cord injury, recommend further evaluation with MRI if there are no clinical contraindications.  EKG - NSR, 93bpm    Pt given 2L NS bolus, Zofran pantoprazole, famotidine, Carafate , Pt seen by GI Dr Morales in ER & was admitted for further Eval. (2018 18:20)      Interval History - no dizziness.    Patient is seen, chart was reviewed and case was discussed with the treatment team.  Pt is not in any distress.   Lying on bed comfortably.   No events reported overnight.   No clinical seizure was reported.  Sitting on chair bed comfortably.    is at bedside.    Vital Signs Last 24 Hrs  T(C): 36.8 (08 Dec 2018 11:45), Max: 37.3 (07 Dec 2018 21:39)  T(F): 98.2 (08 Dec 2018 11:45), Max: 99.2 (07 Dec 2018 21:39)  HR: 74 (08 Dec 2018 11:45) (74 - 91)  BP: 98/62 (08 Dec 2018 11:45) (98/62 - 139/69)  BP(mean): --  RR: 16 (08 Dec 2018 11:45) (16 - 19)  SpO2: 98% (08 Dec 2018 11:45) (96% - 100%)        REVIEW OF SYSTEMS:    Constitutional: No fever, weight loss or fatigue  Eyes: No eye pain, visual disturbances, or discharge  ENT:  No difficulty hearing, tinnitus, vertigo; No sinus or throat pain  Neck: No pain or stiffness  Respiratory: No cough, wheezing, chills or hemoptysis  Cardiovascular: No chest pain, palpitations, shortness of breath, dizziness or leg swelling  Gastrointestinal: No abdominal or epigastric pain. No nausea, vomiting or hematemesis; No diarrhea or constipation. No melena or hematochezia.  Genitourinary: No dysuria, frequency, hematuria or incontinence  Neurological: No headaches, memory loss, loss of strength, numbness or tremors  Psychiatric: No depression, anxiety, mood swings or difficulty sleeping  Musculoskeletal: No joint pain or swelling; No muscle, back or extremity pain  Skin: No itching, burning, rashes or lesions   Lymph Nodes: No enlarged glands  Endocrine: No heat or cold intolerance; No hair loss, No h/o diabetes or thyroid dysfunction  Allergy and Immunologic: No hives or eczema    On Neurological Examination:    Mental Status - Pt is alert, awake, oriented X3. Follows commands well and able to answer questions appropriately.Mood and affect  normal    Speech -  Normal.     Cranial Nerves - Pupils 3 mm equal and reactive to light, extraocular eye movements intact. Pt has no visual field deficit.  Pt has no  facial asymmetry. Facial sensation is intact.Tongue - is in midline.    Muscle tone - is normal all over. Moves all extremities equally      Motor Exam - 5/5 all over,   No drift. No shaking or tremors.    Sensory Exam - Pin prick, temperature, joint position and vibration are intact on either side. Pt withdraws all extremities equally on stimulation.      coordination:    Finger to nose: normal.    Deep tendon Reflexes - 2 plus all over.         Neck Supple -  Yes.     MEDICATIONS    acetaminophen   Tablet .. 650 milliGRAM(s) Oral every 6 hours PRN  aluminum hydroxide/magnesium hydroxide/simethicone Suspension 30 milliLiter(s) Oral every 6 hours PRN  atorvastatin 10 milliGRAM(s) Oral at bedtime  dextrose 40% Gel 15 Gram(s) Oral once PRN  dextrose 5%. 1000 milliLiter(s) IV Continuous <Continuous>  dextrose 50% Injectable 12.5 Gram(s) IV Push once  dextrose 50% Injectable 25 Gram(s) IV Push once  dextrose 50% Injectable 25 Gram(s) IV Push once  gabapentin 200 milliGRAM(s) Oral four times a day  glucagon  Injectable 1 milliGRAM(s) IntraMuscular once PRN  insulin glargine Injectable (LANTUS) 50 Unit(s) SubCutaneous at bedtime  insulin lispro (HumaLOG) corrective regimen sliding scale   SubCutaneous three times a day before meals  insulin lispro (HumaLOG) corrective regimen sliding scale   SubCutaneous at bedtime  lactulose Syrup 15 Gram(s) Oral two times a day  lidocaine 2% Gel 1 Application(s) Topical every 3 hours PRN  pantoprazole    Tablet 40 milliGRAM(s) Oral two times a day  piperacillin/tazobactam IVPB. 3.375 Gram(s) IV Intermittent every 8 hours  rifaximin 550 milliGRAM(s) Oral two times a day  simethicone 80 milliGRAM(s) Chew every 6 hours PRN  sucralfate 1 Gram(s) Oral every 6 hours  ursodiol Capsule 300 milliGRAM(s) Oral three times a day      Allergies    codeine (Anaphylaxis)    Intolerances        LABS:  CBC Full  -  ( 08 Dec 2018 05:45 )  WBC Count : 8.17 K/uL  Hemoglobin : 7.5 g/dL  Hematocrit : 22.6 %  Platelet Count - Automated : 114 K/uL  Mean Cell Volume : 90.4 fl  Mean Cell Hemoglobin : 30.0 pg  Mean Cell Hemoglobin Concentration : 33.2 gm/dL  Auto Neutrophil # : 5.23 K/uL  Auto Lymphocyte # : 1.45 K/uL  Auto Monocyte # : 1.00 K/uL  Auto Eosinophil # : 0.32 K/uL  Auto Basophil # : 0.02 K/uL  Auto Neutrophil % : 64.2 %  Auto Lymphocyte % : 17.7 %  Auto Monocyte % : 12.2 %  Auto Eosinophil % : 3.9 %  Auto Basophil % : 0.2 %    Urinalysis Basic - ( 06 Dec 2018 22:55 )    Color: Yellow / Appearance: Clear / S.010 / pH: x  Gluc: x / Ketone: Negative  / Bili: Small / Urobili: 4   Blood: x / Protein: 25 mg/dL / Nitrite: Negative   Leuk Esterase: Negative / RBC: 3-5 /HPF / WBC 3-5   Sq Epi: x / Non Sq Epi: Occasional / Bacteria: x          137  |  103  |  20  ----------------------------<  105<H>  3.9   |  27  |  1.10    Ca    6.9<L>      08 Dec 2018 05:45  Phos  2.6       Mg     1.8         TPro  4.6<L>  /  Alb  1.8<L>  /  TBili  1.9<H>  /  DBili  x   /  AST  28  /  ALT  21  /  AlkPhos  99      Hemoglobin A1C:     Vitamin B12     RADIOLOGY    ASSESSMENT AND PLAN:      presented with vomiting/dizziness cw near syncope.  GI bleed.  sepsis.  ams.    antibiotic as per ID.  Physical therapy evaluation.  OOB to chair/ambulation with assistance only.  Pain is accessed and addressed.  Plan of care was discussed with family. Questions answered.  Would continue to follow.

## 2018-12-08 NOTE — PROGRESS NOTE ADULT - PROBLEM SELECTOR PLAN 3
-- hx of PUD, Esophagitis, Gastritis, non bleeding ulcers on last EGD10/18  -S/P  Bleeding Duodenal ulcer on Emergent 2 nd EGD on 12/3/ in ICU s/p clips x2, epi injection, and cautery. Pt had EGD done on last Weekend  - pt had multiple bloody BM 2/2 rapid upper GI bleed on admission-  - s/p 3 units FFP, 7 units pRBC, 1 platelets & Vit K 5 mg x 1 dose given 12/3  - GI ([facility]) following  - Dr David dorsey: -  If further bleeding ---> plan to transfer to [facility] for IR / surgical intervention  - c/w , Protonix 40 mg 2x day Carafate q 6 hrs   - Diet advanced to Regular carbohydrate diet w/ evening snack  - Daily CBC

## 2018-12-08 NOTE — PROGRESS NOTE ADULT - SUBJECTIVE AND OBJECTIVE BOX
All interim records and events noted.    awake alert, feeling well  denies black stool/BRBPR/vomitus/SOB/CP      MEDICATIONS  (STANDING):  atorvastatin 10 milliGRAM(s) Oral at bedtime  dextrose 5%. 1000 milliLiter(s) (50 mL/Hr) IV Continuous <Continuous>  dextrose 50% Injectable 12.5 Gram(s) IV Push once  dextrose 50% Injectable 25 Gram(s) IV Push once  dextrose 50% Injectable 25 Gram(s) IV Push once  gabapentin 200 milliGRAM(s) Oral four times a day  insulin glargine Injectable (LANTUS) 50 Unit(s) SubCutaneous at bedtime  insulin lispro (HumaLOG) corrective regimen sliding scale   SubCutaneous three times a day before meals  insulin lispro (HumaLOG) corrective regimen sliding scale   SubCutaneous at bedtime  lactulose Syrup 15 Gram(s) Oral two times a day  pantoprazole    Tablet 40 milliGRAM(s) Oral two times a day  piperacillin/tazobactam IVPB. 3.375 Gram(s) IV Intermittent every 8 hours  rifaximin 550 milliGRAM(s) Oral two times a day  sucralfate 1 Gram(s) Oral every 6 hours  ursodiol Capsule 300 milliGRAM(s) Oral three times a day    MEDICATIONS  (PRN):  acetaminophen   Tablet .. 650 milliGRAM(s) Oral every 6 hours PRN Temp greater or equal to 38C (100.4F)  aluminum hydroxide/magnesium hydroxide/simethicone Suspension 30 milliLiter(s) Oral every 6 hours PRN Dyspepsia  dextrose 40% Gel 15 Gram(s) Oral once PRN Blood Glucose LESS THAN 70 milliGRAM(s)/deciliter  glucagon  Injectable 1 milliGRAM(s) IntraMuscular once PRN Glucose LESS THAN 70 milligrams/deciliter  lidocaine 2% Gel 1 Application(s) Topical every 3 hours PRN pain  simethicone 80 milliGRAM(s) Chew every 6 hours PRN Gas      Vital Signs Last 24 Hrs  T(C): 36.9 (08 Dec 2018 08:00), Max: 37.3 (07 Dec 2018 21:39)  T(F): 98.4 (08 Dec 2018 08:00), Max: 99.2 (07 Dec 2018 21:39)  HR: 88 (08 Dec 2018 08:00) (75 - 91)  BP: 139/69 (08 Dec 2018 08:00) (102/59 - 139/69)  BP(mean): --  RR: 19 (08 Dec 2018 08:00) (17 - 19)  SpO2: 97% (08 Dec 2018 08:00) (93% - 100%)    PHYSICAL EXAM  General: well developed  well nourished, in no acute distress  Head: atraumatic, normocephalic  ENT: sclera anicteric, buccal mucosa moist  Neck: supple, trachea midline, no palpable masses  CV: S1 S2, regular rate and rhythm  Lungs: clear to auscultation, no wheezes/rhonchi  Abdomen: soft, nontender, bowel sounds present, no palpable masses, pouchy  Extrem: no clubbing/cyanosis/edema  Skin: no significant increased ecchymosis/petechiae  Neuro: alert and oriented X3,  no focal deficits      LABS:             7.5    8.17  )-----------( 114      ( 12-08 @ 05:45 )             22.6                8.1    x     )-----------( x        ( 12-07 @ 14:24 )             24.5                7.9    9.11  )-----------( 112      ( 12-07 @ 05:22 )             23.5                8.6    x     )-----------( x        ( 12-06 @ 13:51 )             25.9                7.4    8.63  )-----------( 88       ( 12-06 @ 05:34 )             22.0       12-08    137  |  103  |  20  ----------------------------<  105<H>  3.9   |  27  |  1.10    Ca    6.9<L>      08 Dec 2018 05:45  Phos  2.6     12-08  Mg     1.8     12-08    TPro  4.6<L>  /  Alb  1.8<L>  /  TBili  1.9<H>  /  DBili  x   /  AST  28  /  ALT  21  /  AlkPhos  99  12-08 12-07 @ 05:22  PT17.0 INR1.49  PTT26.8  12-04 @ 06:12  PT18.6 INR1.61  PTT27.7      RADIOLOGY & ADDITIONAL STUDIES:    IMPRESSION/RECOMMENDATIONS:

## 2018-12-08 NOTE — PROGRESS NOTE ADULT - PROBLEM SELECTOR PLAN 4
bld cxs growing gram variable/gram neg rods  ucx pending  repeat bld cxs NGTD, f/u id reccs   started on zosyn  f/u id recs  may need repeat abd imaging  further plan of care to be dw attg, will follow

## 2018-12-08 NOTE — PROGRESS NOTE ADULT - SUBJECTIVE AND OBJECTIVE BOX
JESSICA MARTIN is a 62yMale , patient examined and chart reviewed.      INTERVAL HPI/ OVERNIGHT EVENTS:  Feeling better.  Afebrile.    Past Medical History--  PAST MEDICAL & SURGICAL HISTORY:  GERD with esophagitis: Gastritis &amp; Non Bleeding Ulcers  Hepatic encephalopathy  Obesity  Fatty liver disease, nonalcoholic  Renal stones: 25 years ago  Hypertension  Neuropathy  Hypercholesteremia  Diabetes  S/P cholecystectomy      For details regarding the patient's social history, family history, and other miscellaneous elements, please refer the initial infectious diseases consultation and/or the admitting history and physical examination for this admission.    ROS:  CONSTITUTIONAL:  no fever or chills, feels well, good appetite  EYES:  Negative  blurry vision or double vision  CARDIOVASCULAR:  Negative for chest pain or palpitations  RESPIRATORY:  Negative for cough, wheezing, or SOB   GASTROINTESTINAL:  Negative for nausea, vomiting, diarrhea, constipation, or abdominal pain  GENITOURINARY:  Negative frequency, urgency , dysuria or hematuria   NEUROLOGIC:  No headache, confusion, dizziness, lightheadedness  All other systems were reviewed and are negative     ALLERGIES:  codeine (Anaphylaxis)    Current inpatient medications :    ANTIBIOTICS/RELEVANT:  piperacillin/tazobactam IVPB. 3.375 Gram(s) IV Intermittent every 8 hours  rifaximin 550 milliGRAM(s) Oral two times a day      acetaminophen   Tablet .. 650 milliGRAM(s) Oral every 6 hours PRN  aluminum hydroxide/magnesium hydroxide/simethicone Suspension 30 milliLiter(s) Oral every 6 hours PRN  atorvastatin 10 milliGRAM(s) Oral at bedtime  dextrose 40% Gel 15 Gram(s) Oral once PRN  dextrose 5%. 1000 milliLiter(s) IV Continuous <Continuous>  dextrose 50% Injectable 12.5 Gram(s) IV Push once  dextrose 50% Injectable 25 Gram(s) IV Push once  dextrose 50% Injectable 25 Gram(s) IV Push once  gabapentin 200 milliGRAM(s) Oral four times a day  glucagon  Injectable 1 milliGRAM(s) IntraMuscular once PRN  insulin glargine Injectable (LANTUS) 50 Unit(s) SubCutaneous at bedtime  insulin lispro (HumaLOG) corrective regimen sliding scale   SubCutaneous three times a day before meals  insulin lispro (HumaLOG) corrective regimen sliding scale   SubCutaneous at bedtime  lactulose Syrup 15 Gram(s) Oral two times a day  lidocaine 2% Gel 1 Application(s) Topical every 3 hours PRN  pantoprazole    Tablet 40 milliGRAM(s) Oral two times a day  potassium chloride    Tablet ER 40 milliEquivalent(s) Oral every 4 hours  simethicone 80 milliGRAM(s) Chew every 6 hours PRN  sucralfate 1 Gram(s) Oral every 6 hours  ursodiol Capsule 300 milliGRAM(s) Oral three times a day      Objective:  Vital Signs Last 24 Hrs  T(C): 36.8 (08 Dec 2018 11:45), Max: 37.3 (07 Dec 2018 21:39)  T(F): 98.2 (08 Dec 2018 11:45), Max: 99.2 (07 Dec 2018 21:39)  HR: 74 (08 Dec 2018 11:45) (74 - 91)  BP: 98/62 (08 Dec 2018 11:45) (98/62 - 139/69)  RR: 16 (08 Dec 2018 11:45) (16 - 19)  SpO2: 98% (08 Dec 2018 11:45) (96% - 100%)    Physical Exam:  GEN: NAD, pleasant  HEENT: normocephalic and atraumatic. EOMI. GREYSON. Moist mucosa. Clear Posterior pharynx.  NECK: Supple. No carotid bruits.  No lymphadenopathy or thyromegaly.  LUNGS: Clear to auscultation.  HEART: Regular rate and rhythm without murmur.  ABDOMEN: Soft, nontender, and nondistended.  Positive bowel sounds.  No hepatosplenomegaly was noted.  EXTREMITIES: Without any cyanosis, clubbing, rash, lesions or edema.  NEUROLOGIC: A & O x3, No focal neurological deficits   SKIN: No ulceration or induration present.      LABS:                        7.5    8.17  )-----------( 114      ( 08 Dec 2018 05:45 )             22.6   12-08    137  |  103  |  20  ----------------------------<  105<H>  3.9   |  27  |  1.10    Ca    6.9<L>      08 Dec 2018 05:45  Phos  2.6     12-08  Mg     1.8     12-08    TPro  4.6<L>  /  Alb  1.8<L>  /  TBili  1.9<H>  /  DBili  x   /  AST  28  /  ALT  21  /  AlkPhos  99  12-08      MICROBIOLOGY:    Culture - Urine (collected 07 Dec 2018 08:12)  Source: .Urine Clean Catch (Midstream)  Final Report (08 Dec 2018 15:37):    No growth    Culture - Blood (collected 07 Dec 2018 07:29)  Source: .Blood Blood  Preliminary Report (08 Dec 2018 08:01):    No growth to date.    Culture - Blood (collected 06 Dec 2018 07:42)  Source: .Blood Blood-Peripheral  Gram Stain (06 Dec 2018 19:53):    Growth in anaerobic bottle: Gram Variable Rods  Preliminary Report (07 Dec 2018 18:22):    Growth in anaerobic bottle: Escherichia coli    See previous culture [id number]    "Due to technical problems, Proteus sp. will Not be reported as part of    the BCID panel until further notice"    ***Blood Panel PCR results on this specimen are available    approximately 3 hours after the Gram stain result.***    Gram stain, PCR, and/or culture results may not always    correspond due to difference in methodologies.    ************************************************************    This PCR assay was performed using [employer].    The following targets are tested for: Enterococcus,    vancomycin resistant enterococci, Listeria monocytogenes,    coagulase negative staphylococci, S. aureus,    methicillin resistant S. aureus, Streptococcus agalactiae    (Group B), S. pneumoniae, S. pyogenes (Group A),    Acinetobacter baumannii, Enterobacter cloacae, E. coli,    Klebsiella oxytoca, K. pneumoniae, Proteus sp.,    Serratia marcescens, Haemophilus influenzae,    Neisseria meningitidis, Pseudomonas aeruginosa, Candida    albicans, C. glabrata, C krusei, C parapsilosis,    C. tropicalis and the KPC resistance gene.  Organism: Blood Culture PCR (06 Dec 2018 23:44)  Organism: Blood Culture PCR (06 Dec 2018 23:44)      -  Escherichia coli: Detec      Method Type: PCR    Culture - Blood (collected 06 Dec 2018 07:42)  Source: .Blood Blood-Peripheral  Gram Stain (06 Dec 2018 20:52):    Growth in anaerobic bottle: Gram Negative Rods    Growth in aerobic bottle: Gram Negative Rods  Preliminary Report (07 Dec 2018 18:18):    Growth in aerobic and anaerobic bottles: Escherichia coli      RADIOLOGY & ADDITIONAL STUDIES:    EXAM:  CT ANGIO ABD PELV (W)AW IC                            PROCEDURE DATE:  12/01/2018          INTERPRETATION:   Cirrhosis, coagulopathic. Evaluate for GI bleed.    CTA abdomen and pelvis prior CT abdomen 10/17/2018  190 cc Omnipaque 350 injected intravenously  Axial images augmented by coronal sagittal reformats 3-D MIP images.    Limited. Suboptimal arterial phase imaging secondary to poor bolus.  No definite evidence of active arterial extravasation. No pooling noted   on the venous phase.  Multiple upper abdominal and retroperitoneal varices similar to prior.  Status post cholecystectomy. Stable prominence of the common duct   attributed postcholecystectomy change. Liver shrunken suggestive of   cirrhosis. Pancreas spleen unremarkable.  No renal abnormalities.  No ascites.  Colonic diverticula without diverticulitis or other acute bowel   inflammation. No bowel obstruction.  Normal size prostate with calcination. Bladder not remarkable.  No acute skeletal abnormality.    Impression:    Limited. Suboptimal arterial phase.  No obvious active gastrointestinal hemorrhage. If warranted scintigraphy   might be considered for additional evaluation.  Additional findings as discussed      EXAM:  XR CHEST PORTABLE URGENT 1V                            PROCEDURE DATE:  12/06/2018          INTERPRETATION:      INDICATION: Fever syncopal episode collapse    Single frontal view of the chest compared to prior study of 12/1/2018.    FINDINGS/  IMPRESSION:       No acute consolidation. There is no pleural effusion. There is no   pneumothorax.  The cardiac silhouette is within normal limits.  There is   degenerative changes.      Assessment :   Pt is a 61 YO M with PMHx obesity, DM 2, HTN, HLD, nephrolithiasis, neuropathy, poss JEAN  admitted with GIB sp egd now with new fevers with Ecoli bacteremia  Source likely GI   UTI ruled out  CXR clear  WBC wnl    Plan :   Cont Zosyn  Fu final cultures  Change to po antibx once cultures finalized  Trend temps and wbc    D/w Dr RAYM Arreguin    Continue with present regiment.  Appropriate use of antibiotics and adverse effects reviewed.    I have discussed the above plan of care with patient/ family in detail. They expressed understanding of the the treatment plan . Risks, benefits and alternatives discussed in detail. I have asked if they have any questions or concerns and appropriately addressed them to the best of my ability .      35 minutes reviewing notes, labs data/ imaging , discussion with multidisciplinary team.    Thank you for allowing me to participate in care of your patient .        Tanya Rios MD  Infectious Disease  [phone]

## 2018-12-09 LAB
ALBUMIN SERPL ELPH-MCNC: 1.6 G/DL — LOW (ref 3.3–5)
ALP SERPL-CCNC: 102 U/L — SIGNIFICANT CHANGE UP (ref 40–120)
ALT FLD-CCNC: 23 U/L — SIGNIFICANT CHANGE UP (ref 12–78)
ANION GAP SERPL CALC-SCNC: 7 MMOL/L — SIGNIFICANT CHANGE UP (ref 5–17)
AST SERPL-CCNC: 32 U/L — SIGNIFICANT CHANGE UP (ref 15–37)
BILIRUB SERPL-MCNC: 1.8 MG/DL — HIGH (ref 0.2–1.2)
BUN SERPL-MCNC: 15 MG/DL — SIGNIFICANT CHANGE UP (ref 7–23)
CALCIUM SERPL-MCNC: 7.6 MG/DL — LOW (ref 8.5–10.1)
CHLORIDE SERPL-SCNC: 104 MMOL/L — SIGNIFICANT CHANGE UP (ref 96–108)
CO2 SERPL-SCNC: 29 MMOL/L — SIGNIFICANT CHANGE UP (ref 22–31)
CREAT SERPL-MCNC: 1 MG/DL — SIGNIFICANT CHANGE UP (ref 0.5–1.3)
GLUCOSE SERPL-MCNC: 56 MG/DL — LOW (ref 70–99)
HCT VFR BLD CALC: 23.9 % — LOW (ref 39–50)
HGB BLD-MCNC: 7.9 G/DL — LOW (ref 13–17)
MCHC RBC-ENTMCNC: 30.4 PG — SIGNIFICANT CHANGE UP (ref 27–34)
MCHC RBC-ENTMCNC: 33.1 GM/DL — SIGNIFICANT CHANGE UP (ref 32–36)
MCV RBC AUTO: 91.9 FL — SIGNIFICANT CHANGE UP (ref 80–100)
NRBC # BLD: 0 /100 WBCS — SIGNIFICANT CHANGE UP (ref 0–0)
PLATELET # BLD AUTO: 135 K/UL — LOW (ref 150–400)
POTASSIUM SERPL-MCNC: 3.6 MMOL/L — SIGNIFICANT CHANGE UP (ref 3.5–5.3)
POTASSIUM SERPL-SCNC: 3.6 MMOL/L — SIGNIFICANT CHANGE UP (ref 3.5–5.3)
PROT SERPL-MCNC: 4.8 G/DL — LOW (ref 6–8.3)
RBC # BLD: 2.6 M/UL — LOW (ref 4.2–5.8)
RBC # FLD: 18 % — HIGH (ref 10.3–14.5)
SODIUM SERPL-SCNC: 140 MMOL/L — SIGNIFICANT CHANGE UP (ref 135–145)
WBC # BLD: 7.95 K/UL — SIGNIFICANT CHANGE UP (ref 3.8–10.5)
WBC # FLD AUTO: 7.95 K/UL — SIGNIFICANT CHANGE UP (ref 3.8–10.5)

## 2018-12-09 RX ORDER — FUROSEMIDE 40 MG
20 TABLET ORAL ONCE
Qty: 0 | Refills: 0 | Status: COMPLETED | OUTPATIENT
Start: 2018-12-09 | End: 2018-12-09

## 2018-12-09 RX ORDER — ERTAPENEM SODIUM 1 G/1
1000 INJECTION, POWDER, LYOPHILIZED, FOR SOLUTION INTRAMUSCULAR; INTRAVENOUS EVERY 24 HOURS
Qty: 0 | Refills: 0 | Status: DISCONTINUED | OUTPATIENT
Start: 2018-12-09 | End: 2018-12-11

## 2018-12-09 RX ORDER — POTASSIUM CHLORIDE 20 MEQ
40 PACKET (EA) ORAL ONCE
Qty: 0 | Refills: 0 | Status: COMPLETED | OUTPATIENT
Start: 2018-12-09 | End: 2018-12-09

## 2018-12-09 RX ADMIN — GABAPENTIN 200 MILLIGRAM(S): 400 CAPSULE ORAL at 11:56

## 2018-12-09 RX ADMIN — Medication 1 GRAM(S): at 18:23

## 2018-12-09 RX ADMIN — LACTULOSE 15 GRAM(S): 10 SOLUTION ORAL at 18:23

## 2018-12-09 RX ADMIN — Medication 1 GRAM(S): at 23:18

## 2018-12-09 RX ADMIN — PANTOPRAZOLE SODIUM 40 MILLIGRAM(S): 20 TABLET, DELAYED RELEASE ORAL at 05:54

## 2018-12-09 RX ADMIN — Medication 1 GRAM(S): at 11:56

## 2018-12-09 RX ADMIN — ATORVASTATIN CALCIUM 10 MILLIGRAM(S): 80 TABLET, FILM COATED ORAL at 21:17

## 2018-12-09 RX ADMIN — ERTAPENEM SODIUM 120 MILLIGRAM(S): 1 INJECTION, POWDER, LYOPHILIZED, FOR SOLUTION INTRAMUSCULAR; INTRAVENOUS at 11:56

## 2018-12-09 RX ADMIN — INSULIN GLARGINE 50 UNIT(S): 100 INJECTION, SOLUTION SUBCUTANEOUS at 22:24

## 2018-12-09 RX ADMIN — GABAPENTIN 200 MILLIGRAM(S): 400 CAPSULE ORAL at 23:18

## 2018-12-09 RX ADMIN — URSODIOL 300 MILLIGRAM(S): 250 TABLET, FILM COATED ORAL at 14:22

## 2018-12-09 RX ADMIN — Medication 40 MILLIEQUIVALENT(S): at 18:23

## 2018-12-09 RX ADMIN — URSODIOL 300 MILLIGRAM(S): 250 TABLET, FILM COATED ORAL at 21:17

## 2018-12-09 RX ADMIN — GABAPENTIN 200 MILLIGRAM(S): 400 CAPSULE ORAL at 18:23

## 2018-12-09 RX ADMIN — URSODIOL 300 MILLIGRAM(S): 250 TABLET, FILM COATED ORAL at 05:54

## 2018-12-09 RX ADMIN — PANTOPRAZOLE SODIUM 40 MILLIGRAM(S): 20 TABLET, DELAYED RELEASE ORAL at 18:23

## 2018-12-09 RX ADMIN — Medication 1 GRAM(S): at 05:54

## 2018-12-09 RX ADMIN — Medication 20 MILLIGRAM(S): at 14:22

## 2018-12-09 RX ADMIN — GABAPENTIN 200 MILLIGRAM(S): 400 CAPSULE ORAL at 05:54

## 2018-12-09 RX ADMIN — LACTULOSE 15 GRAM(S): 10 SOLUTION ORAL at 05:54

## 2018-12-09 RX ADMIN — PIPERACILLIN AND TAZOBACTAM 25 GRAM(S): 4; .5 INJECTION, POWDER, LYOPHILIZED, FOR SOLUTION INTRAVENOUS at 05:54

## 2018-12-09 NOTE — PROVIDER CONTACT NOTE (CRITICAL VALUE NOTIFICATION) - NAME OF MD/NP/PA/DO NOTIFIED:
Ancelmo ALDRIDGE
Ancelmo ALDRIDGE
Ancelmo Amaro
Dr Arreguin
Dr Domingo
Dr JYOTHI Watson
Dr RAMY Arreguin
Dr. Deras
Dr. Lorraine Rios
tom loredo
Dr. Rios

## 2018-12-09 NOTE — PROGRESS NOTE ADULT - ATTENDING COMMENTS
Pt seen, Examined, case & care plan d/w Pt,   at detail.  D/W Dr Rios -ID .PICC Line in AM   AM labs, PT---> SANTANA   D/C Planning

## 2018-12-09 NOTE — PROGRESS NOTE ADULT - SUBJECTIVE AND OBJECTIVE BOX
JESSICA MARTIN is a yMale , patient examined and chart reviewed.      INTERVAL HPI/ OVERNIGHT EVENTS:  Afebrile. No events.  Denies complaints.    Past Medical History--  PAST MEDICAL & SURGICAL HISTORY:  GERD with esophagitis: Gastritis &amp; Non Bleeding Ulcers  Hepatic encephalopathy  Obesity  Fatty liver disease, nonalcoholic  Renal stones: 25 years ago  Hypertension  Neuropathy  Hypercholesteremia  Diabetes  S/P cholecystectomy      For details regarding the patient's social history, family history, and other miscellaneous elements, please refer the initial infectious diseases consultation and/or the admitting history and physical examination for this admission.    ROS:  CONSTITUTIONAL:  no fever or chills, feels well, good appetite  EYES:  Negative  blurry vision or double vision  CARDIOVASCULAR:  Negative for chest pain or palpitations  RESPIRATORY:  Negative for cough, wheezing, or SOB   GASTROINTESTINAL:  Negative for nausea, vomiting, diarrhea, constipation, or abdominal pain  GENITOURINARY:  Negative frequency, urgency , dysuria or hematuria   NEUROLOGIC:  No headache, confusion, dizziness, lightheadedness  All other systems were reviewed and are negative     ALLERGIES:  codeine (Anaphylaxis)    Current inpatient medications :    ANTIBIOTICS/RELEVANT:    ertapenem  IVPB 1000 milliGRAM(s) IV Intermittent every 24 hours  MEDICATIONS  (STANDING):  atorvastatin 10 milliGRAM(s) Oral at bedtime  dextrose 5%. 1000 milliLiter(s) (50 mL/Hr) IV Continuous <Continuous>  dextrose 50% Injectable 12.5 Gram(s) IV Push once  dextrose 50% Injectable 25 Gram(s) IV Push once  dextrose 50% Injectable 25 Gram(s) IV Push once    gabapentin 200 milliGRAM(s) Oral four times a day  insulin glargine Injectable (LANTUS) 50 Unit(s) SubCutaneous at bedtime  insulin lispro (HumaLOG) corrective regimen sliding scale   SubCutaneous three times a day before meals  insulin lispro (HumaLOG) corrective regimen sliding scale   SubCutaneous at bedtime  lactulose Syrup 15 Gram(s) Oral two times a day  pantoprazole    Tablet 40 milliGRAM(s) Oral two times a day  rifaximin 550 milliGRAM(s) Oral two times a day  sucralfate 1 Gram(s) Oral every 6 hours  ursodiol Capsule 300 milliGRAM(s) Oral three times a day    MEDICATIONS  (PRN):  acetaminophen   Tablet .. 650 milliGRAM(s) Oral every 6 hours PRN Temp greater or equal to 38C (100.4F)  aluminum hydroxide/magnesium hydroxide/simethicone Suspension 30 milliLiter(s) Oral every 6 hours PRN Dyspepsia  dextrose 40% Gel 15 Gram(s) Oral once PRN Blood Glucose LESS THAN 70 milliGRAM(s)/deciliter  glucagon  Injectable 1 milliGRAM(s) IntraMuscular once PRN Glucose LESS THAN 70 milligrams/deciliter  lidocaine 2% Gel 1 Application(s) Topical every 3 hours PRN pain  simethicone 80 milliGRAM(s) Chew every 6 hours PRN Gas      Objective:  Vital Signs Last 24 Hrs  T(C): 36.6 (09 Dec 2018 19:52), Max: 37 (09 Dec 2018 00:00)  T(F): 97.9 (09 Dec 2018 19:52), Max: 98.6 (09 Dec 2018 00:00)  HR: 90 (09 Dec 2018 19:52) (85 - 93)  BP: 111/62 (09 Dec 2018 19:52) (102/56 - 129/67)  RR: 18 (09 Dec 2018 19:52) (17 - 19)  SpO2: 97% (09 Dec 2018 19:52) (97% - 98%)    Physical Exam:  GEN: NAD, pleasant  HEENT: normocephalic and atraumatic. EOMI. GREYSON. Moist mucosa. Clear Posterior pharynx.  NECK: Supple. No carotid bruits.  No lymphadenopathy or thyromegaly.  LUNGS: Clear to auscultation.  HEART: Regular rate and rhythm without murmur.  ABDOMEN: Soft, nontender, and nondistended.  Positive bowel sounds.  No hepatosplenomegaly was noted.  EXTREMITIES: Without any cyanosis, clubbing, rash, lesions or edema.  NEUROLOGIC: A & O x3, No focal neurological deficits   SKIN: No ulceration or induration present.      LABS:                        7.9    7.95  )-----------( 135      ( 09 Dec 2018 06:09 )             23.9   12-09    140  |  104  |  15  ----------------------------<  56<L>  3.6   |  29  |  1.00    Ca    7.6<L>      09 Dec 2018 06:09  Phos  2.6     12-08  Mg     1.8     12-08    TPro  4.8<L>  /  Alb  1.6<L>  /  TBili  1.8<H>  /  DBili  x   /  AST  32  /  ALT  23  /  AlkPhos  102  12-09      MICROBIOLOGY:    Culture - Blood (collected 07 Dec 2018 20:28)  Source: .Blood Blood-Venous  Preliminary Report (08 Dec 2018 21:01):    No growth to date.    Culture - Blood (collected 07 Dec 2018 20:24)  Source: .Blood Blood-Peripheral  Preliminary Report (08 Dec 2018 21:01):    No growth to date.    Culture - Urine (collected 07 Dec 2018 08:12)  Source: .Urine Clean Catch (Midstream)  Final Report (08 Dec 2018 15:37):    No growth    Culture - Blood (collected 07 Dec 2018 07:29)  Source: .Blood Blood  Preliminary Report (08 Dec 2018 08:01):    No growth to date.    Culture - Blood (12.06.18 @ 07:42)    -  Amikacin: S <=8    -  Ampicillin: R >16 These ampicillin results predict results for amoxicillin    -  Ampicillin/Sulbactam: R 16/8    -  Aztreonam: R >16    -  Cefazolin: R >16    -  Cefepime: R >16    -  Cefoxitin: S <=4    -  Ceftriaxone: R >32 Enterobacter, Citrobacter, and Serratia may develop resistance during prolonged therapy    -  Ciprofloxacin: R >2    -  Ertapenem: S <=0.5    -  Gentamicin: S <=1    -  Imipenem: S <=1    -  Levofloxacin: R >4    -  Meropenem: S <=1    -  Piperacillin/Tazobactam: R <=8    -  Tobramycin: S <=2    -  Trimethoprim/Sulfamethoxazole: S <=0.5/9.5    Gram Stain:   Growth in anaerobic bottle: Gram Negative Rods  Growth in aerobic bottle: Gram Negative Rods    Specimen Source: .Blood Blood-Peripheral    Organism: Escherichia coli ESBL    Culture Results:   Growth in aerobic and anaerobic bottles: Escherichia coli ESBL    Organism Identification: Escherichia coli ESBL    Method Type: LEONARDO        RADIOLOGY & ADDITIONAL STUDIES:    EXAM:  CT ANGIO ABD PELV (W)AW IC                            PROCEDURE DATE:  12/01/2018          INTERPRETATION:   Cirrhosis, coagulopathic. Evaluate for GI bleed.    CTA abdomen and pelvis prior CT abdomen 10/17/2018  190 cc Omnipaque 350 injected intravenously  Axial images augmented by coronal sagittal reformats 3-D MIP images.    Limited. Suboptimal arterial phase imaging secondary to poor bolus.  No definite evidence of active arterial extravasation. No pooling noted   on the venous phase.  Multiple upper abdominal and retroperitoneal varices similar to prior.  Status post cholecystectomy. Stable prominence of the common duct   attributed postcholecystectomy change. Liver shrunken suggestive of   cirrhosis. Pancreas spleen unremarkable.  No renal abnormalities.  No ascites.  Colonic diverticula without diverticulitis or other acute bowel   inflammation. No bowel obstruction.  Normal size prostate with calcination. Bladder not remarkable.  No acute skeletal abnormality.    Impression:    Limited. Suboptimal arterial phase.  No obvious active gastrointestinal hemorrhage. If warranted scintigraphy   might be considered for additional evaluation.  Additional findings as discussed      EXAM:  XR CHEST PORTABLE URGENT 1V                            PROCEDURE DATE:  12/06/2018          INTERPRETATION:      INDICATION: Fever syncopal episode collapse    Single frontal view of the chest compared to prior study of 12/1/2018.    FINDINGS/  IMPRESSION:       No acute consolidation. There is no pleural effusion. There is no   pneumothorax.  The cardiac silhouette is within normal limits.  There is   degenerative changes.      Assessment :   Pt is a 63 YO M with PMHx obesity, DM 2, HTN, HLD, nephrolithiasis, neuropathy, poss JEAN  admitted with GIB sp egd now with new fevers with Ecoli ESBL bacteremia  Source likely GI   UTI ruled out  CXR clear  WBC wnl    Plan :   Cont Invanz x 10 days  PICC line  Trend temps and wbc      D/w Dr RAMY Arreguin    Continue with present regiment.  Appropriate use of antibiotics and adverse effects reviewed.    I have discussed the above plan of care with patient/ family in detail. They expressed understanding of the the treatment plan . Risks, benefits and alternatives discussed in detail. I have asked if they have any questions or concerns and appropriately addressed them to the best of my ability .    35 minutes reviewing notes, labs data/ imaging , discussion with multidisciplinary team.    Thank you for allowing me to participate in care of your patient .        Tanya Rios MD  Infectious Disease  [phone]

## 2018-12-09 NOTE — PROGRESS NOTE ADULT - PROBLEM SELECTOR PLAN 4
Anemia & Thrombocytopenia- Acute Blood loss Anemia with Bleeding DU - H/H Low stable. Hgb Low stable   S/P EGD x 2 by GI Dr Morales   -Pt had Multiple pRBC transfusions , FFP & Platelets transfusions.  - As per Dr. David dorsey - consider transfusing to Hgb of 8  Dr Duran's D/W H/H , No Transfusion as Hb > 7

## 2018-12-09 NOTE — PROGRESS NOTE ADULT - SUBJECTIVE AND OBJECTIVE BOX
Patient is a 63y old  Male who presents with a chief complaint of vomiting and near syncope (09 Dec 2018 10:46)      INTERVAL HPI:      OVERNIGHT EVENTS:    Home Medications:  Bacid (LAC) oral tablet: 1 tab(s) orally 2 times a day for  2weeks (30 Nov 2018 19:41)  HumaLOG 100 units/mL subcutaneous solution: 20 unit(s) subcutaneous 2 times a day(breakfast and lunch) (30 Nov 2018 19:41)  HumaLOG 100 units/mL subcutaneous solution: 30 unit(s) subcutaneous once a day(@dinner) (30 Nov 2018 19:41)  irbesartan 300 mg oral tablet: 1 tab(s) orally once a day (30 Nov 2018 19:41)  lactulose 10 g/15 mL oral syrup: 15 milliliter(s) orally 3 times a day (30 Nov 2018 19:41)  Lasix 20 mg oral tablet: 1 tab(s) orally once a day (30 Nov 2018 19:41)  pantoprazole 40 mg oral delayed release tablet: 1 tab(s) orally once a day (30 Nov 2018 19:41)  phytonadione 100 mcg oral tablet: 1 tab(s) orally once a day (30 Nov 2018 19:41)  potassium chloride 10 mEq oral tablet, extended release: 1 tab(s) orally once a day (30 Nov 2018 19:41)  pravastatin 40 mg oral tablet: 1 tab(s) orally once a day (30 Nov 2018 19:41)  Vitamin D3 2000 intl units oral tablet: 1 tab(s) orally once a day (30 Nov 2018 19:41)      MEDICATIONS  (STANDING):  atorvastatin 10 milliGRAM(s) Oral at bedtime  dextrose 5%. 1000 milliLiter(s) (50 mL/Hr) IV Continuous <Continuous>  dextrose 50% Injectable 12.5 Gram(s) IV Push once  dextrose 50% Injectable 25 Gram(s) IV Push once  dextrose 50% Injectable 25 Gram(s) IV Push once  ertapenem  IVPB 1000 milliGRAM(s) IV Intermittent every 24 hours  gabapentin 200 milliGRAM(s) Oral four times a day  insulin glargine Injectable (LANTUS) 50 Unit(s) SubCutaneous at bedtime  insulin lispro (HumaLOG) corrective regimen sliding scale   SubCutaneous three times a day before meals  insulin lispro (HumaLOG) corrective regimen sliding scale   SubCutaneous at bedtime  lactulose Syrup 15 Gram(s) Oral two times a day  pantoprazole    Tablet 40 milliGRAM(s) Oral two times a day  rifaximin 550 milliGRAM(s) Oral two times a day  sucralfate 1 Gram(s) Oral every 6 hours  ursodiol Capsule 300 milliGRAM(s) Oral three times a day    MEDICATIONS  (PRN):  acetaminophen   Tablet .. 650 milliGRAM(s) Oral every 6 hours PRN Temp greater or equal to 38C (100.4F)  aluminum hydroxide/magnesium hydroxide/simethicone Suspension 30 milliLiter(s) Oral every 6 hours PRN Dyspepsia  dextrose 40% Gel 15 Gram(s) Oral once PRN Blood Glucose LESS THAN 70 milliGRAM(s)/deciliter  glucagon  Injectable 1 milliGRAM(s) IntraMuscular once PRN Glucose LESS THAN 70 milligrams/deciliter  lidocaine 2% Gel 1 Application(s) Topical every 3 hours PRN pain  simethicone 80 milliGRAM(s) Chew every 6 hours PRN Gas      Allergies    codeine (Anaphylaxis)    Intolerances        REVIEW OF SYSTEMS:  CONSTITUTIONAL: No fever, No chills, No fatigue, No myalgia, No Body ache, No Weakness  EYES: No eye pain,  No visual disturbances, No discharge, NO Redness  ENMT:  No ear pain, No nose bleed, No vertigo; No sinus or throat pain, No Congestion  NECK: No pain, No stiffness  RESPIRATORY: No cough, wheezing, No  hemoptysis, No shortness of breath  CARDIOVASCULAR: No chest pain, palpitations  GASTROINTESTINAL: No abdominal or epigastric pain. No nausea, No vomiting; No diarrhea or constipation. [  ] BM  GENITOURINARY: No dysuria, No frequency, No urgency, No hematuria, or incontinence  NEUROLOGICAL: No headaches, No dizziness, No numbness, No tingling, No tremors, No weakness  EXT: No Swelling, No Pain, No Edema  SKIN:  [  ] No itching, burning, rashes, or lesions   MUSCULOSKELETAL: No joint pain or swelling; No muscle pain, No back pain, No extremity pain  PSYCHIATRIC: No depression, anxiety, mood swings or difficulty sleeping at night  PAIN SCALE: [  ] None  [  ] Other-  ROS Unable to obtain due to - [  ] Dementia  [  ] Lethargy  [  ] Sedated [  ] non verbal  REST OF REVIEW Of SYSTEM - [  ] Normal     Vital Signs Last 24 Hrs  T(C): 36.4 (09 Dec 2018 12:16), Max: 37 (09 Dec 2018 00:00)  T(F): 97.5 (09 Dec 2018 12:16), Max: 98.6 (09 Dec 2018 00:00)  HR: 88 (09 Dec 2018 12:16) (85 - 94)  BP: 116/66 (09 Dec 2018 12:16) (102/56 - 147/71)  BP(mean): --  RR: 18 (09 Dec 2018 12:16) (16 - 19)  SpO2: 98% (09 Dec 2018 12:16) (97% - 99%)  Finger Stick          PHYSICAL EXAM:  GENERAL:  [  ] NAD , [  ] well appearing, [  ] Agitated, [  ] Drowsy,  [  ] Lethargy, [  ] confused   HEAD:  [  ] Normal, [  ] Other  EYES:  [  ] EOMI, [  ] PERRLA, [  ] conjunctiva and sclera clear normal, [  ] Other,  [  ] Pallor,[  ] Discharge  ENMT:  [  ] Normal, [  ] Moist mucous membranes, [  ] Good dentition, [  ] No Thrush  NECK:  [  ] Supple, [  ] No JVD, [  ] Normal thyroid, [  ] Lymphadenopathy [  ] Other  CHEST/LUNG:  [  ] Clear to auscultation bilaterally, [  ] Breath Sounds equal OR Decrease,  [  ] No rales, [  ] No rhonchi  [  ]  No wheezing  HEART:  [  ] Regular rate and rhythm, [  ] tachycardia, [  ] Bradycardia,  [  ] irregular  [  ] No murmurs, No rubs, No gallops, [  ] PPM in place (Mfr:  )  ABDOMEN:  [  ] Soft, [  ] Nontender, [  ] Nondistended, [  ] No mass, [  ] Bowel sounds present, [  ] obese  NERVOUS SYSTEM:  [  ] Alert & Oriented X3, [  ] Nonfocal  [  ] Confusion  [  ] Encephalopathic [  ] Sedated [  ] Unable to assess, [  ] Other-  EXTREMITIES: [  ] 2+ Peripheral Pulses, No clubbing, No cyanosis,  [  ] edema B/L lower EXT. [  ] PVD stasis skin changes B/L Lower EXT  LYMPH: No lymphadenopathy noted  SKIN:  [  ] No rashes or lesions, [  ] Pressure Ulcers, [  ] ecchymosis, [  ] Skin Tears, [  ] Other    DIET:     LABS:                        7.9    7.95  )-----------( 135      ( 09 Dec 2018 06:09 )             23.9     09 Dec 2018 06:09    140    |  104    |  15     ----------------------------<  56     3.6     |  29     |  1.00     Ca    7.6        09 Dec 2018 06:09    TPro  4.8    /  Alb  1.6    /  TBili  1.8    /  DBili  x      /  AST  32     /  ALT  23     /  AlkPhos  102    09 Dec 2018 06:09      Culture Results:   No growth to date. (12-07 @ 20:28)  Culture Results:   No growth to date. (12-07 @ 20:24)  Culture Results:   No growth (12-07 @ 08:12)  Culture Results:   No growth to date. (12-07 @ 07:29)  Culture Results:   Growth in aerobic and anaerobic bottles: Escherichia coli ESBL (12-06 @ 07:42)  Culture Results:   Growth in anaerobic bottle: Escherichia coli ESBL  See previous culture [id number]  "Due to technical problems, Proteus sp. will Not be reported as part of  the BCID panel until further notice"  ***Blood Panel PCR results on this specimen are available  approximately 3 hours after the Gram stain result.***  Gram stain, PCR, and/or culture results may not always  correspond due to difference in methodologies.  ************************************************************  This PCR assay was performed using [employer].  The following targets are tested for: Enterococcus,  vancomycin resistant enterococci, Listeria monocytogenes,  coagulase negative staphylococci, S. aureus,  methicillin resistant S. aureus, Streptococcus agalactiae  (Group B), S. pneumoniae, S. pyogenes (Group A),  Acinetobacter baumannii, Enterobacter cloacae, E. coli,  Klebsiella oxytoca, K. pneumoniae, Proteus sp.,  Serratia marcescens, Haemophilus influenzae,  Neisseria meningitidis, Pseudomonas aeruginosa,Candida  albicans, C. glabrata, C krusei, C parapsilosis,  C. tropicalis and the KPC resistance gene. (12-06 @ 07:42)      culture blood  -- .Blood Blood-Venous 12-07 @ 20:28    culture urine  --  12-07 @ 20:28  culture blood  -- .Blood Blood-Peripheral 12-07 @ 20:24    culture urine  --  12-07 @ 20:24    Culture - Blood (collected 07 Dec 2018 20:28)  Source: .Blood Blood-Venous  Preliminary Report (08 Dec 2018 21:01):    No growth to date.    Culture - Blood (collected 07 Dec 2018 20:24)  Source: .Blood Blood-Peripheral  Preliminary Report (08 Dec 2018 21:01):    No growth to date.    Culture - Urine (collected 07 Dec 2018 08:12)  Source: .Urine Clean Catch (Midstream)  Final Report (08 Dec 2018 15:37):    No growth    Culture - Blood (collected 07 Dec 2018 07:29)  Source: .Blood Blood  Preliminary Report (08 Dec 2018 08:01):    No growth to date.    Culture - Blood (collected 06 Dec 2018 07:42)  Source: .Blood Blood-Peripheral  Gram Stain (06 Dec 2018 19:53):    Growth in anaerobic bottle: Gram Variable Rods  Final Report (08 Dec 2018 16:12):    Growth in anaerobic bottle: Escherichia coli ESBL    See previous culture [id number]    "Due to technical problems, Proteus sp. will Not be reported as part of    the BCID panel until further notice"    ***Blood Panel PCR results on this specimen are available    approximately 3 hours after the Gram stain result.***    Gram stain, PCR, and/or culture results may not always    correspond due to difference in methodologies.    ************************************************************    This PCR assay was performed using [employer].    The following targets are tested for: Enterococcus,    vancomycin resistant enterococci, Listeria monocytogenes,    coagulase negative staphylococci, S. aureus,    methicillin resistant S. aureus, Streptococcus agalactiae    (Group B), S. pneumoniae, S. pyogenes (Group A),    Acinetobacter baumannii, Enterobacter cloacae, E. coli,    Klebsiella oxytoca, K. pneumoniae, Proteus sp.,    Serratia marcescens, Haemophilus influenzae,    Neisseria meningitidis, Pseudomonas aeruginosa,Candida    albicans, C. glabrata, C krusei, C parapsilosis,    C. tropicalis and the KPC resistance gene.  Organism: Blood Culture PCR (08 Dec 2018 16:12)  Organism: Blood Culture PCR (08 Dec 2018 16:12)    Culture - Blood (collected 06 Dec 2018 07:42)  Source: .Blood Blood-Peripheral  Gram Stain (06 Dec 2018 20:52):    Growth in anaerobic bottle: Gram Negative Rods    Growth in aerobic bottle: Gram Negative Rods  Final Report (08 Dec 2018 16:11):    Growth in aerobic and anaerobic bottles: Escherichia coli ESBL  Organism: Escherichia coli ESBL (08 Dec 2018 16:11)  Organism: Escherichia coli ESBL (08 Dec 2018 16:11)         Anemia Panel:  Ferritin, Serum: 127 ng/mL (12-03-18 @ 08:01)  Iron Total, Serum: 74 ug/dL (12-03-18 @ 08:01)  Iron - Total Binding Capacity.: 172 ug/dL (12-03-18 @ 08:01)  Vitamin B12, Serum: 915 pg/mL (12-03-18 @ 08:01)  Folate, Serum: 8.0 ng/mL (12-03-18 @ 08:01)  Absolute Reticulocytes: 107.1 K/uL (12-03-18 @ 06:24)      Thyroid Panel:      RADIOLOGY & ADDITIONAL TESTS:      HEALTH ISSUES - PROBLEM Dx:  E coli bacteremia: E coli bacteremia  Fever: Fever  Anemia: Anemia  MISA (acute kidney injury): MISA (acute kidney injury)  GIB (gastrointestinal bleeding): GIB (gastrointestinal bleeding)  Lactic acid blood increased: Lactic acid blood increased  Prophylactic measure: Prophylactic measure  Pedal edema: Pedal edema  Neuropathy: Neuropathy  Hypercholesteremia: Hypercholesteremia  Hypertension: Hypertension  Fatty liver disease, nonalcoholic: Fatty liver disease, nonalcoholic  Hepatic encephalopathy: Hepatic encephalopathy  Diabetes: Diabetes  Near syncope: Near syncope  Vomiting: Vomiting          Consultant(s) Notes Reviewed:  [  ] YES     Care Discussed with [X] Consultants  [  ] Patient  [  ] Family  [  ]   [  ] Social Service  [  ] RN, [  ] Physical Therapy  DVT PPX: [  ] Lovenox, [  ] S C Heparin, [  ] Coumadin, [  ] Xarelto, [  ] Eliquis, [  ] Pradaxa, [  ] IV Heparin drip, [  ] SCD [  ] Contraindication 2 to GI Bleed,[  ] Ambulation  Advanced directive: [  ] None, [  ] DNR/DNI Patient is a 63y old  Male who presents with a chief complaint of vomiting and near syncope (09 Dec 2018 10:46)      INTERVAL HPI:  Pt is a 63 y/o M with PMHx of Type 2 DM, HTN, HLD, obesity, nephrolithiasis (last stone 25 years ago), neuropathy, HE, NAFLD, Hyper ammonemia, recent Gastritis/ esophagitis on EGD & Gram variable teddy bacteremia in 10/2018  who presents with vomiting and  near syncope/falling out of a moving car. Pt states that [place] and felt "off", his vision was cloudy, he pulled over the car and stopped and vomited. Vomitus was "creamy white." Pt denies seeing blood in vomitus. His dtr then took over driving and Pt again felt like he needed to vomit and told dtr to pull over. He thought the car had stopped and he opened the door and fell out of the moving car. He laid on the ground for ~ 15 minutes. Pt says he possibly lost consciousness. States when he was lying on the ground felt very cold. Pt has had "terrible heartburn" for 2 days and felt chills for 2 days. Pt denies abdominal pain, CP, palp, SOB, cough, myalgias, rash, HA. Pt admits to chills, vomiting (4 episodes), diarrhea (from lactulose), dizziness, pedal edema, tingling in hands (chronic, intermittent). Pt denies recent travel, sick contacts, different eating patterns.   Pt states that early this week pt saw her Endocrinologist & started back on Metformin 500 mg daily as per Dr's recommendation.  In ED Pt's vitals were: T(C): 36.3, HR: 91, BP: 85/59, RR: 15, SpO2: 98% on RA  Labs significant for lactate 5.6, , Hgb 10.4, total bili 3.7, alk phos 183, ammonia 72, Mg 1.4. UA - trace LE, mod blood, RBC 6-10, urine glucose 1,000.   CXR - negative chest  Abd Xray - There is mild content in the colon and no overt sense of bowel obstruction.  CT head - no intracranial hemorrhage or mass effect  CT cervical - Multilevel degenerative disc disease/cervical spondylosis, with posterior osteophyte disc complex C6-7 resulting in flattening of the cervical cord. If there is a clinical concern for spinal cord injury, recommend further evaluation with MRI if there are no clinical contraindications.  EKG - NSR, 93bpm  Pt given 2L NS bolus, Zofran pantoprazole, famotidine, Carafate , Pt seen by GI Dr Morales in ER & was admitted for further Eval.    12/1/18: Pt seen, examined, Had total 4 bloody BM so far , Lactate improved, pt had CT A/P with IV Contrast. Drop in H/H. plan for, start Protonix drip,  2 u pRBC 1 u FFP & 5 mg Vit K x 1 as d/w Dr Franks -GI, Elevated ammonia level. Coagulopathy, Improving Lactate. Ac Blood loss Anemia.    12/2/18: Pt seen, Examined, pt was transferred to ICU on 12/1/18 for symptomatic upper GI Bleed, S/P 7  units pRBC in Total , 3 FFP, 1 platelets transfusion given, Pt had emergent EGD done that found Ac Bleeding Duodenal ulcer found. S/P Epi given. Homeostasis obtained, Pt seen By Sx Dr Hernandez. H/H Stable,  today with MISA, Coagulopathy , Leukocytosis & High Lactate level.  H/H Low stable with Ac Blood  Loss Anemia. Hematology Dr Rios called, as pt is followed by Dr Rios- H/O as out pt.    12/3/18:. Patient denies any acute complaints this morning. Hgb drop from 8.2 --> 7.7. Leukocytosis present at 15.08. Lactate level drop from 3.2 --> 2.5    12/4/19: Patient was sitting out of bed in chair, awake, alert, and oriented x3. Hb dropped to 6.6.  Patient states he is feeling better since yesterday. Patient had EGD done yesterday that showed large bleeding Duodenal ulcer s/p clips x2, epi injection, and cautery. Patient was given 3 units of packed red blood cells yesterday. Hbg increased from 7.3 --> 7.8. WBC count drop from 12.53 --> 9.47. Patient tolerating ice chips and clear liquid diet. Patient denies fever, chills, headache, weakness chest pain, palpitations, sob, N/V, diarrhea, constipation, blood in stool, urinary symptoms. Patient admits to feeling fatigued. Has not had a BM today.     12/5/18: , patient lying comfortably in bed. Hgb at 7.8. WBC 9.35, patient tolerating clear liquid diet. Patient denies fevers, chills, CP, palpitations, N/V/D. As per nurse, BM are still dark. Patient is asymptomatic at this time. On Regular diet.     12/6/18: . Patient lying comfortably in bed. Patient was febrile overnight 102F. Tylenol prn, AVOID NSAIDS (risk of rebleed). Hgb drop 7.8 --> 7.4. repeat H/H 8.6.  WBC 8.63. Creatinine up .98-->1.3. Tolerating regular carbohydrate diet, ate breakfast this morning. States that this was his second meal and has not had any associated N/V. Patient states he had a BM earlier this morning that was normal in consistency without any blood in stool. Patient is asymptomatic at this time. Patients BP is in 100/50's this AM, patient states that his pressure normal runs between "100-110". Patient denies headache, fatigue, weakness, chest pain, sob, palpitations, N/V, abdominal pain, diarrhea, constipation. PT saw aptient today and alerted patient felt light-headed. Will re-assess. ID Dr Rios called. Repeat H/H stable.    12/7/18: Patient lying comfortably in bed. Has no acute complaints this morning. Patient was afebrile overnight.  fever last night ,Blood cultures are positive for "Gram negative rods & Gram variable rods." Started on Zosyn IVPB 3.375g q8h.  Hgb at 8.1. K+ drop from 3.6 -->3.1. Repleted with 40 meq Kcl tablet q4h for 2 doses. Patient reports black BM yesterday. No BM this morning. Patient admits to fatigue. Denies headache, fever, chills, SOB, chest pain, palpitations, abdominal pain, diarrhea, constipation, blood in stool, urinary symptoms.   12/8/18: Pt feels better, tired, On IV Abx, H/H low stable.  12/9/18: Pt seen, Examined, No Complaints, ON IV INVANZ for ESBL E COLI. Low stable H/H, S/P Lasix 20 mh x 1 dose for foot edema.    OVERNIGHT EVENTS: NONE    Home Medications:  Bacid (LAC) oral tablet: 1 tab(s) orally 2 times a day for  2weeks (30 Nov 2018 19:41)  HumaLOG 100 units/mL subcutaneous solution: 20 unit(s) subcutaneous 2 times a day(breakfast and lunch) (30 Nov 2018 19:41)  HumaLOG 100 units/mL subcutaneous solution: 30 unit(s) subcutaneous once a day(@dinner) (30 Nov 2018 19:41)  irbesartan 300 mg oral tablet: 1 tab(s) orally once a day (30 Nov 2018 19:41)  lactulose 10 g/15 mL oral syrup: 15 milliliter(s) orally 3 times a day (30 Nov 2018 19:41)  Lasix 20 mg oral tablet: 1 tab(s) orally once a day (30 Nov 2018 19:41)  pantoprazole 40 mg oral delayed release tablet: 1 tab(s) orally once a day (30 Nov 2018 19:41)  phytonadione 100 mcg oral tablet: 1 tab(s) orally once a day (30 Nov 2018 19:41)  potassium chloride 10 mEq oral tablet, extended release: 1 tab(s) orally once a day (30 Nov 2018 19:41)  pravastatin 40 mg oral tablet: 1 tab(s) orally once a day (30 Nov 2018 19:41)  Vitamin D3 2000 intl units oral tablet: 1 tab(s) orally once a day (30 Nov 2018 19:41)      MEDICATIONS  (STANDING):  atorvastatin 10 milliGRAM(s) Oral at bedtime  dextrose 5%. 1000 milliLiter(s) (50 mL/Hr) IV Continuous <Continuous>  dextrose 50% Injectable 12.5 Gram(s) IV Push once  dextrose 50% Injectable 25 Gram(s) IV Push once  dextrose 50% Injectable 25 Gram(s) IV Push once  ertapenem  IVPB 1000 milliGRAM(s) IV Intermittent every 24 hours  gabapentin 200 milliGRAM(s) Oral four times a day  insulin glargine Injectable (LANTUS) 50 Unit(s) SubCutaneous at bedtime  insulin lispro (HumaLOG) corrective regimen sliding scale   SubCutaneous three times a day before meals  insulin lispro (HumaLOG) corrective regimen sliding scale   SubCutaneous at bedtime  lactulose Syrup 15 Gram(s) Oral two times a day  pantoprazole    Tablet 40 milliGRAM(s) Oral two times a day  rifaximin 550 milliGRAM(s) Oral two times a day  sucralfate 1 Gram(s) Oral every 6 hours  ursodiol Capsule 300 milliGRAM(s) Oral three times a day    MEDICATIONS  (PRN):  acetaminophen   Tablet .. 650 milliGRAM(s) Oral every 6 hours PRN Temp greater or equal to 38C (100.4F)  aluminum hydroxide/magnesium hydroxide/simethicone Suspension 30 milliLiter(s) Oral every 6 hours PRN Dyspepsia  dextrose 40% Gel 15 Gram(s) Oral once PRN Blood Glucose LESS THAN 70 milliGRAM(s)/deciliter  glucagon  Injectable 1 milliGRAM(s) IntraMuscular once PRN Glucose LESS THAN 70 milligrams/deciliter  lidocaine 2% Gel 1 Application(s) Topical every 3 hours PRN pain  simethicone 80 milliGRAM(s) Chew every 6 hours PRN Gas      Allergies    codeine (Anaphylaxis)    Intolerances        REVIEW OF SYSTEMS: No Complaints  CONSTITUTIONAL: No fever, No chills, No fatigue, No myalgia, No Body ache, No Weakness  EYES: No eye pain,  No visual disturbances, No discharge, NO Redness  ENMT:  No ear pain, No nose bleed, No vertigo; No sinus or throat pain, No Congestion  NECK: No pain, No stiffness  RESPIRATORY: No cough, wheezing, No  hemoptysis, No shortness of breath  CARDIOVASCULAR: No chest pain, palpitations  GASTROINTESTINAL: No abdominal or epigastric pain. No nausea, No vomiting; No diarrhea or constipation. [ x ] BM Large  GENITOURINARY: No dysuria, No frequency, No urgency, No hematuria, or incontinence  NEUROLOGICAL: No headaches, No dizziness, No numbness, No tingling, No tremors, No weakness  EXT: No Swelling, No Pain, No Edema  SKIN:  [ x ] No itching, burning, rashes, or lesions   MUSCULOSKELETAL: No joint pain or swelling; No muscle pain, No back pain, No extremity pain  PSYCHIATRIC: No depression, anxiety, mood swings or difficulty sleeping at night  PAIN SCALE: [ x ] None  [  ] Other-  ROS Unable to obtain due to - [  ] Dementia  [  ] Lethargy  [  ] Sedated [  ] non verbal  REST OF REVIEW Of SYSTEM - [  ] Normal     Vital Signs Last 24 Hrs  T(C): 36.4 (09 Dec 2018 12:16), Max: 37 (09 Dec 2018 00:00)  T(F): 97.5 (09 Dec 2018 12:16), Max: 98.6 (09 Dec 2018 00:00)  HR: 88 (09 Dec 2018 12:16) (85 - 94)  BP: 116/66 (09 Dec 2018 12:16) (102/56 - 147/71)  BP(mean): --  RR: 18 (09 Dec 2018 12:16) (16 - 19)  SpO2: 98% (09 Dec 2018 12:16) (97% - 99%)  Finger Stick    PHYSICAL EXAM:  GENERAL:  [ x ] NAD , [ x ] well appearing, [  ] Agitated, [  ] Drowsy,  [  ] Lethargy, [  ] confused   HEAD:  [x  ] Normal, [  ] Other  EYES:  [x  ] EOMI, [x  ] PERRLA, [ x ] conjunctiva and sclera clear normal, [  ] Other,  [ x ] Pallor,[  ] Discharge  ENMT:  [ x ] Normal, [x  ] Moist mucous membranes, [x  ] Good dentition, [ x ] No Thrush  NECK:  [ x ] Supple, [x] No JVD, [ x ] Normal thyroid, [  ] Lymphadenopathy [  ] Other  CHEST/LUNG:  [  x] Clear to auscultation bilaterally, [x  ] Breath Sounds equal B/L,  [ x ] No rales, [x  ] No rhonchi  [x]  No wheezing  HEART:  [ x ] Regular rate and rhythm, [  ] tachycardia, [  ] Bradycardia,  [  ] irregular  [ x ] No murmurs, No rubs, No gallops, [  ] PPM in place (Mfr:  )  ABDOMEN:  [ x ] Soft, [ x ] Nontender, [ x ] Nondistended, [ x ] No mass, [ x ] Bowel sounds present, [ x ] obese  NERVOUS SYSTEM:  [x  ] Alert & Oriented X3, [ x ] Nonfocal  [  ] Confusion  [  ] Encephalopathic [  ] Sedated [  ] Unable to assess, [  ] Other-  EXTREMITIES: [ x] 2+ Peripheral Pulses, No clubbing, No cyanosis,  [x  ] 1+ B/L  edema B/L lower EXT. [  ] PVD stasis skin changes B/L Lower EXT  LYMPH: No lymphadenopathy noted  SKIN:  [x] No rashes or lesions, [  ] Pressure Ulcers, [  ] ecchymosis, [  ] Skin Tears, [  ] Other    DIET: DM    LABS:                        7.9    7.95  )-----------( 135      ( 09 Dec 2018 06:09 )             23.9     09 Dec 2018 06:09    140    |  104    |  15     ----------------------------<  56     3.6     |  29     |  1.00     Ca    7.6        09 Dec 2018 06:09    TPro  4.8    /  Alb  1.6    /  TBili  1.8    /  DBili  x      /  AST  32     /  ALT  23     /  AlkPhos  102    09 Dec 2018 06:09      Culture Results:   No growth to date. (12-07 @ 20:28)  Culture Results:   No growth to date. (12-07 @ 20:24)  Culture Results:   No growth (12-07 @ 08:12)  Culture Results:   No growth to date. (12-07 @ 07:29)  Culture Results:   Growth in aerobic and anaerobic bottles: Escherichia coli ESBL (12-06 @ 07:42)  Culture Results:   Growth in anaerobic bottle: Escherichia coli ESBL  See previous culture [id number]  "Due to technical problems, Proteus sp. will Not be reported as part of  the BCID panel until further notice"  ***Blood Panel PCR results on this specimen are available  approximately 3 hours after the Gram stain result.***  Gram stain, PCR, and/or culture results may not always  correspond due to difference in methodologies.  ************************************************************  This PCR assay was performed using [employer].  The following targets are tested for: Enterococcus,  vancomycin resistant enterococci, Listeria monocytogenes,  coagulase negative staphylococci, S. aureus,  methicillin resistant S. aureus, Streptococcus agalactiae  (Group B), S. pneumoniae, S. pyogenes (Group A),  Acinetobacter baumannii, Enterobacter cloacae, E. coli,  Klebsiella oxytoca, K. pneumoniae, Proteus sp.,  Serratia marcescens, Haemophilus influenzae,  Neisseria meningitidis, Pseudomonas aeruginosa,Candida  albicans, C. glabrata, C krusei, C parapsilosis,  C. tropicalis and the KPC resistance gene. (12-06 @ 07:42)      culture blood  -- .Blood Blood-Venous 12-07 @ 20:28    culture urine  --  12-07 @ 20:28  culture blood  -- .Blood Blood-Peripheral 12-07 @ 20:24    culture urine  --  12-07 @ 20:24    Culture - Blood (collected 07 Dec 2018 20:28)  Source: .Blood Blood-Venous  Preliminary Report (08 Dec 2018 21:01):    No growth to date.    Culture - Blood (collected 07 Dec 2018 20:24)  Source: .Blood Blood-Peripheral  Preliminary Report (08 Dec 2018 21:01):    No growth to date.    Culture - Urine (collected 07 Dec 2018 08:12)  Source: .Urine Clean Catch (Midstream)  Final Report (08 Dec 2018 15:37):    No growth    Culture - Blood (collected 07 Dec 2018 07:29)  Source: .Blood Blood  Preliminary Report (08 Dec 2018 08:01):    No growth to date.    Culture - Blood (collected 06 Dec 2018 07:42)  Source: .Blood Blood-Peripheral  Gram Stain (06 Dec 2018 19:53):    Growth in anaerobic bottle: Gram Variable Rods  Final Report (08 Dec 2018 16:12):    Growth in anaerobic bottle: Escherichia coli ESBL    See previous culture [id number]    "Due to technical problems, Proteus sp. will Not be reported as part of    the BCID panel until further notice"    ***Blood Panel PCR results on this specimen are available    approximately 3 hours after the Gram stain result.***    Gram stain, PCR, and/or culture results may not always    correspond due to difference in methodologies.    ************************************************************    This PCR assay was performed using [employer].    The following targets are tested for: Enterococcus,    vancomycin resistant enterococci, Listeria monocytogenes,    coagulase negative staphylococci, S. aureus,    methicillin resistant S. aureus, Streptococcus agalactiae    (Group B), S. pneumoniae, S. pyogenes (Group A),    Acinetobacter baumannii, Enterobacter cloacae, E. coli,    Klebsiella oxytoca, K. pneumoniae, Proteus sp.,    Serratia marcescens, Haemophilus influenzae,    Neisseria meningitidis, Pseudomonas aeruginosa,Candida    albicans, C. glabrata, C krusei, C parapsilosis,    C. tropicalis and the KPC resistance gene.  Organism: Blood Culture PCR (08 Dec 2018 16:12)  Organism: Blood Culture PCR (08 Dec 2018 16:12)    Culture - Blood (collected 06 Dec 2018 07:42)  Source: .Blood Blood-Peripheral  Gram Stain (06 Dec 2018 20:52):    Growth in anaerobic bottle: Gram Negative Rods    Growth in aerobic bottle: Gram Negative Rods  Final Report (08 Dec 2018 16:11):    Growth in aerobic and anaerobic bottles: Escherichia coli ESBL  Organism: Escherichia coli ESBL (08 Dec 2018 16:11)  Organism: Escherichia coli ESBL (08 Dec 2018 16:11)         Anemia Panel:  Ferritin, Serum: 127 ng/mL (12-03-18 @ 08:01)  Iron Total, Serum: 74 ug/dL (12-03-18 @ 08:01)  Iron - Total Binding Capacity.: 172 ug/dL (12-03-18 @ 08:01)  Vitamin B12, Serum: 915 pg/mL (12-03-18 @ 08:01)  Folate, Serum: 8.0 ng/mL (12-03-18 @ 08:01)  Absolute Reticulocytes: 107.1 K/uL (12-03-18 @ 06:24)      Thyroid Panel:      RADIOLOGY & ADDITIONAL TESTS:NONE      HEALTH ISSUES - PROBLEM Dx:  E coli bacteremia: E coli bacteremia  Fever: Fever  Anemia: Anemia  MISA (acute kidney injury): MISA (acute kidney injury)  GIB (gastrointestinal bleeding): GIB (gastrointestinal bleeding)  Lactic acid blood increased: Lactic acid blood increased  Prophylactic measure: Prophylactic measure  Pedal edema: Pedal edema  Neuropathy: Neuropathy  Hypercholesteremia: Hypercholesteremia  Hypertension: Hypertension  Fatty liver disease, nonalcoholic: Fatty liver disease, nonalcoholic  Hepatic encephalopathy: Hepatic encephalopathy  Diabetes: Diabetes  Near syncope: Near syncope  Vomiting: Vomiting    Consultant(s) Notes Reviewed:  [x  ] YES     Care Discussed with [X] Consultants  [ x ] Patient  [  ] Family  [  ]   [ x ] Social Service  [ x ] RN, [  ] Physical Therapy  DVT PPX: [  ] Lovenox, [  ] S C Heparin, [  ] Coumadin, [  ] Xarelto, [  ] Eliquis, [  ] Pradaxa, [  ] IV Heparin drip, [ x ] SCD [  ] Contraindication 2 to GI Bleed,[  ] Ambulation  Advanced directive: [ x ] None, [  ] DNR/DNI

## 2018-12-09 NOTE — PROGRESS NOTE ADULT - SUBJECTIVE AND OBJECTIVE BOX
All interim records and events noted.    up in chair  no active bleeds  feeling well      MEDICATIONS  (STANDING):  atorvastatin 10 milliGRAM(s) Oral at bedtime  dextrose 5%. 1000 milliLiter(s) (50 mL/Hr) IV Continuous <Continuous>  dextrose 50% Injectable 12.5 Gram(s) IV Push once  dextrose 50% Injectable 25 Gram(s) IV Push once  dextrose 50% Injectable 25 Gram(s) IV Push once  ertapenem  IVPB 1000 milliGRAM(s) IV Intermittent every 24 hours  gabapentin 200 milliGRAM(s) Oral four times a day  insulin glargine Injectable (LANTUS) 50 Unit(s) SubCutaneous at bedtime  insulin lispro (HumaLOG) corrective regimen sliding scale   SubCutaneous three times a day before meals  insulin lispro (HumaLOG) corrective regimen sliding scale   SubCutaneous at bedtime  lactulose Syrup 15 Gram(s) Oral two times a day  pantoprazole    Tablet 40 milliGRAM(s) Oral two times a day  rifaximin 550 milliGRAM(s) Oral two times a day  sucralfate 1 Gram(s) Oral every 6 hours  ursodiol Capsule 300 milliGRAM(s) Oral three times a day    MEDICATIONS  (PRN):  acetaminophen   Tablet .. 650 milliGRAM(s) Oral every 6 hours PRN Temp greater or equal to 38C (100.4F)  aluminum hydroxide/magnesium hydroxide/simethicone Suspension 30 milliLiter(s) Oral every 6 hours PRN Dyspepsia  dextrose 40% Gel 15 Gram(s) Oral once PRN Blood Glucose LESS THAN 70 milliGRAM(s)/deciliter  glucagon  Injectable 1 milliGRAM(s) IntraMuscular once PRN Glucose LESS THAN 70 milligrams/deciliter  lidocaine 2% Gel 1 Application(s) Topical every 3 hours PRN pain  simethicone 80 milliGRAM(s) Chew every 6 hours PRN Gas      Vital Signs Last 24 Hrs  T(C): 36.5 (09 Dec 2018 08:00), Max: 37 (09 Dec 2018 00:00)  T(F): 97.7 (09 Dec 2018 08:00), Max: 98.6 (09 Dec 2018 00:00)  HR: 90 (09 Dec 2018 08:00) (74 - 94)  BP: 129/67 (09 Dec 2018 08:00) (98/62 - 147/71)  BP(mean): --  RR: 18 (09 Dec 2018 08:00) (16 - 19)  SpO2: 97% (09 Dec 2018 08:00) (97% - 99%)    PHYSICAL EXAM  General: well developed  well nourished, in no acute distress  Head: atraumatic, normocephalic  ENT: sclera anicteric, buccal mucosa moist  Neck: supple, trachea midline  CV: S1 S2, regular rate and rhythm  Lungs: clear to auscultation, no wheezes/rhonchi  Abdomen: pouchy, not disstended, bowel sounds present, nontender  Extrem: no clubbing/cyanosis/edema  Skin: no significant increased ecchymosis/petechiae  Neuro: alert and oriented X3,  no focal deficits      LABS:             7.9    7.95  )-----------( 135      ( 12-09 @ 06:09 )             23.9                7.5    8.17  )-----------( 114      ( 12-08 @ 05:45 )             22.6                8.1    x     )-----------( x        ( 12-07 @ 14:24 )             24.5                7.9    9.11  )-----------( 112      ( 12-07 @ 05:22 )             23.5                8.6    x     )-----------( x        ( 12-06 @ 13:51 )             25.9       12-09    140  |  104  |  15  ----------------------------<  56<L>  3.6   |  29  |  1.00    Ca    7.6<L>      09 Dec 2018 06:09  Phos  2.6     12-08  Mg     1.8     12-08    TPro  4.8<L>  /  Alb  1.6<L>  /  TBili  1.8<H>  /  DBili  x   /  AST  32  /  ALT  23  /  AlkPhos  102  12-09 12-07 @ 05:22  PT17.0 INR1.49  PTT26.8      RADIOLOGY & ADDITIONAL STUDIES:    IMPRESSION/RECOMMENDATIONS:

## 2018-12-09 NOTE — PROGRESS NOTE ADULT - ASSESSMENT
61 yo man w JEAN/NAFLD assoc cirrhosis, baseline bilirubin 2.7, adm after near syncope and found  anemia with GI bleed due to large duodenal ulcer; s/p EGD and injection of ulcer with epinephrine  Also history hx of coagulopathy due to cirrhosis and baseline chronic thrombocytopenia  Workup show adequate B12/folate/iron  On antibiotics for E coli bacteremia without UTI    -CBC stable, no signs of active bleed  -continue acute care  -symptomatic management from Heme standpoint  -
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
## 2018-12-09 NOTE — PROGRESS NOTE ADULT - PROBLEM SELECTOR PLAN 2
ESBL E Coli Bacteremia -Likely GI Source from ulcer   Repeat Blood c/s x2 -NEG  On IV Invanz IV Daily as per ID, Plan for PICC Line for IV Abx

## 2018-12-09 NOTE — PROGRESS NOTE ADULT - SUBJECTIVE AND OBJECTIVE BOX
INTERVAL HPI/OVERNIGHT EVENTS:  pt seen and examined  denies n/v/d/abd pain  afebrile overnight, no vomiting no diarrhea no s/s active gib  repeat blood culture ngtd   + non bloody bm     MEDICATIONS  (STANDING):  atorvastatin 10 milliGRAM(s) Oral at bedtime  dextrose 5%. 1000 milliLiter(s) (50 mL/Hr) IV Continuous <Continuous>  dextrose 50% Injectable 12.5 Gram(s) IV Push once  dextrose 50% Injectable 25 Gram(s) IV Push once  dextrose 50% Injectable 25 Gram(s) IV Push once  gabapentin 200 milliGRAM(s) Oral four times a day  insulin glargine Injectable (LANTUS) 50 Unit(s) SubCutaneous at bedtime  insulin lispro (HumaLOG) corrective regimen sliding scale   SubCutaneous three times a day before meals  insulin lispro (HumaLOG) corrective regimen sliding scale   SubCutaneous at bedtime  lactulose Syrup 15 Gram(s) Oral two times a day  pantoprazole    Tablet 40 milliGRAM(s) Oral two times a day  piperacillin/tazobactam IVPB. 3.375 Gram(s) IV Intermittent every 8 hours  potassium chloride    Tablet ER 40 milliEquivalent(s) Oral every 4 hours  rifaximin 550 milliGRAM(s) Oral two times a day  sucralfate 1 Gram(s) Oral every 6 hours  ursodiol Capsule 300 milliGRAM(s) Oral three times a day    MEDICATIONS  (PRN):  acetaminophen   Tablet .. 650 milliGRAM(s) Oral every 6 hours PRN Temp greater or equal to 38C (100.4F)  dextrose 40% Gel 15 Gram(s) Oral once PRN Blood Glucose LESS THAN 70 milliGRAM(s)/deciliter  glucagon  Injectable 1 milliGRAM(s) IntraMuscular once PRN Glucose LESS THAN 70 milligrams/deciliter  lidocaine 2% Gel 1 Application(s) Topical every 3 hours PRN pain      Allergies    codeine (Anaphylaxis)    Intolerances        Review of Systems:    General:  fever  Eyes:  Good vision, no reported pain  ENT:  No sore throat, pain, runny nose, dysphagia  CV:  No pain, palpitations, hypo/hypertension  Resp:  No dyspnea, cough, tachypnea, wheezing  GI:  No pain, No nausea, No vomiting, No diarrhea, No constipation, No weight loss, No fever, No pruritis, No rectal bleeding, No melena, No dysphagia  :  No pain, bleeding, incontinence, nocturia  Muscle:  No pain, weakness  Neuro:  No weakness, tingling, memory problems  Psych:  No fatigue, insomnia, mood problems, depression  Endocrine:  No polyuria, polydypsia, cold/heat intolerance  Heme:  No petechiae, ecchymosis, easy bruisability  Skin:  No rash, tattoos, scars, edema    Vital Signs Last 24 Hrs  T(C): 36.5 (09 Dec 2018 08:00), Max: 37 (09 Dec 2018 00:00)  T(F): 97.7 (09 Dec 2018 08:00), Max: 98.6 (09 Dec 2018 00:00)  HR: 90 (09 Dec 2018 08:00) (74 - 94)  BP: 129/67 (09 Dec 2018 08:00) (98/62 - 147/71)  BP(mean): --  RR: 18 (09 Dec 2018 08:00) (16 - 19)  SpO2: 97% (09 Dec 2018 08:00) (97% - 99%)  PHYSICAL EXAM:    Constitutional: NAD   HEENT: ncat  Neck: No LAD  Respiratory: dec bs  Cardiovascular: S1 and S2, RRR  Gastrointestinal: obese abd soft nt mild dt  Extremities: No peripheral edema  Vascular: 2+ peripheral pulses  Neurological: awake alert responds appropriately   Skin: mild jaundice      LABS:                                                      7.9    7.95  )-----------( 135      ( 09 Dec 2018 06:09 )             23.9   12-09    140  |  104  |  15  ----------------------------<  56<L>  3.6   |  29  |  1.00    Ca    7.6<L>      09 Dec 2018 06:09  Phos  2.6     12-08  Mg     1.8     12-08    TPro  4.8<L>  /  Alb  1.6<L>  /  TBili  1.8<H>  /  DBili  x   /  AST  32  /  ALT  23  /  AlkPhos  102  12-09      Color: Yellow / Appearance: Clear / S.010 / pH: x  Gluc: x / Ketone: Negative  / Bili: Small / Urobili: 4   Blood: x / Protein: 25 mg/dL / Nitrite: Negative   Leuk Esterase: Negative / RBC: 3-5 /HPF / WBC 3-5   Sq Epi: x / Non Sq Epi: Occasional / Bacteria: x        RADIOLOGY & ADDITIONAL TESTS:

## 2018-12-09 NOTE — PROGRESS NOTE ADULT - PROBLEM SELECTOR PLAN 3
-- hx of PUD, Esophagitis, Gastritis, non bleeding ulcers on last EGD10/18  -S/P  Bleeding Duodenal ulcer on Emergent 2 nd EGD on 12/3/ in ICU s/p clips x2, epi injection, and cautery. Pt had EGD done on last Weekend  - pt had multiple bloody BM 2/2 rapid upper GI bleed on admission-  - s/p 3 units FFP, 7 units pRBC, 1 platelets & Vit K 5 mg x 1 dose given 12/3  - GI ([facility]) following  - Dr David dorsey: -  If further bleeding ---> plan to transfer to [facility] for IR / surgical intervention  - c/w , Protonix 40 mg 2x day Carafate q 6 hrs   - Diet advanced to Regular carbohydrate diet w/ evening snack  - Daily CBC

## 2018-12-09 NOTE — PROGRESS NOTE ADULT - PROBLEM SELECTOR PLAN 5
-- Coagulopathy 2/2 Liver disease/ Cirrhosis  Abnormal LFT's , Bili & PT/PTT/INR stable  - Dr Rios -Hem Follow up as out pt  - pt follows GI (Dr. Morales)   -cont ppi, sandra, rifaximin bid  -Lasix 20 Mg IVP x 1 dose today
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
## 2018-12-09 NOTE — PROGRESS NOTE ADULT - PROBLEM SELECTOR PLAN 6
-A1C -improved, still elevated  -Hypoglycemia Protocol, Low Dose Insulin Sliding Coverage, Lantus 50 units qhs, Accu-Cheks AC&HS.

## 2018-12-09 NOTE — PROGRESS NOTE ADULT - PROBLEM SELECTOR PLAN 1
- S/P fever Resolved  Tylenol prn for fevers,   Blood Cx - positive for E Coli , on Zosyn IVPB 3.375g q8h. By ID Dr Rios, Repeat Blood c/s x2-NEG  UA/UC - neg  Chest Xray - neg  ID Dr Rios D/W On IV Invanz

## 2018-12-09 NOTE — PROVIDER CONTACT NOTE (CRITICAL VALUE NOTIFICATION) - ACTION/TREATMENT ORDERED:
MD made aware.  Monitoring ongoing
fluids repeat
Lactate trending down.  MD aware
No further interventions ordered. Repeat Lactate in AM. No signs of distress noted. Pt refused SCD machine. Pt teaching done on risks and benefits of refusing SCD machine. Will continue to monitor.
no orders given
monitoring ongoing
As per doctor rosas repeat blood culture in the AM, start zosyn 3.375 Q8 hours IV, and collect urine culture as well as urinalysis before administration of IV ABX.
Orders received to d/c Zosyn and start Invanz

## 2018-12-09 NOTE — PROGRESS NOTE ADULT - SUBJECTIVE AND OBJECTIVE BOX
pt seen  no complaints  tolerating diet  ICU Vital Signs Last 24 Hrs  T(C): 36.5 (09 Dec 2018 08:00), Max: 37 (09 Dec 2018 00:00)  T(F): 97.7 (09 Dec 2018 08:00), Max: 98.6 (09 Dec 2018 00:00)  HR: 90 (09 Dec 2018 08:00) (74 - 94)  BP: 129/67 (09 Dec 2018 08:00) (98/62 - 147/71)  BP(mean): --  ABP: --  ABP(mean): --  RR: 18 (09 Dec 2018 08:00) (16 - 19)  SpO2: 97% (09 Dec 2018 08:00) (97% - 99%)  gen-NAD  resp-clear  abd-soft NT/ND                          7.9    7.95  )-----------( 135      ( 09 Dec 2018 06:09 )             23.9

## 2018-12-10 DIAGNOSIS — R60.0 LOCALIZED EDEMA: ICD-10-CM

## 2018-12-10 LAB
ANION GAP SERPL CALC-SCNC: 7 MMOL/L — SIGNIFICANT CHANGE UP (ref 5–17)
BUN SERPL-MCNC: 11 MG/DL — SIGNIFICANT CHANGE UP (ref 7–23)
CALCIUM SERPL-MCNC: 7.7 MG/DL — LOW (ref 8.5–10.1)
CHLORIDE SERPL-SCNC: 107 MMOL/L — SIGNIFICANT CHANGE UP (ref 96–108)
CO2 SERPL-SCNC: 27 MMOL/L — SIGNIFICANT CHANGE UP (ref 22–31)
CREAT SERPL-MCNC: 0.92 MG/DL — SIGNIFICANT CHANGE UP (ref 0.5–1.3)
GLUCOSE SERPL-MCNC: 66 MG/DL — LOW (ref 70–99)
HCT VFR BLD CALC: 23.1 % — LOW (ref 39–50)
HGB BLD-MCNC: 7.7 G/DL — LOW (ref 13–17)
MCHC RBC-ENTMCNC: 31.2 PG — SIGNIFICANT CHANGE UP (ref 27–34)
MCHC RBC-ENTMCNC: 33.3 GM/DL — SIGNIFICANT CHANGE UP (ref 32–36)
MCV RBC AUTO: 93.5 FL — SIGNIFICANT CHANGE UP (ref 80–100)
NRBC # BLD: 0 /100 WBCS — SIGNIFICANT CHANGE UP (ref 0–0)
PLATELET # BLD AUTO: 132 K/UL — LOW (ref 150–400)
POTASSIUM SERPL-MCNC: 3.9 MMOL/L — SIGNIFICANT CHANGE UP (ref 3.5–5.3)
POTASSIUM SERPL-SCNC: 3.9 MMOL/L — SIGNIFICANT CHANGE UP (ref 3.5–5.3)
RBC # BLD: 2.47 M/UL — LOW (ref 4.2–5.8)
RBC # FLD: 18 % — HIGH (ref 10.3–14.5)
SODIUM SERPL-SCNC: 141 MMOL/L — SIGNIFICANT CHANGE UP (ref 135–145)
WBC # BLD: 5.42 K/UL — SIGNIFICANT CHANGE UP (ref 3.8–10.5)
WBC # FLD AUTO: 5.42 K/UL — SIGNIFICANT CHANGE UP (ref 3.8–10.5)

## 2018-12-10 PROCEDURE — 36569 INSJ PICC 5 YR+ W/O IMAGING: CPT

## 2018-12-10 PROCEDURE — 76937 US GUIDE VASCULAR ACCESS: CPT | Mod: 26

## 2018-12-10 RX ORDER — PANTOPRAZOLE SODIUM 20 MG/1
1 TABLET, DELAYED RELEASE ORAL
Qty: 60 | Refills: 0 | OUTPATIENT
Start: 2018-12-10 | End: 2019-01-08

## 2018-12-10 RX ORDER — FUROSEMIDE 40 MG
20 TABLET ORAL DAILY
Qty: 0 | Refills: 0 | Status: DISCONTINUED | OUTPATIENT
Start: 2018-12-11 | End: 2018-12-11

## 2018-12-10 RX ORDER — INSULIN LISPRO 100/ML
VIAL (ML) SUBCUTANEOUS
Qty: 0 | Refills: 0 | Status: DISCONTINUED | OUTPATIENT
Start: 2018-12-10 | End: 2018-12-11

## 2018-12-10 RX ORDER — SUCRALFATE 1 G
1 TABLET ORAL
Qty: 120 | Refills: 0 | OUTPATIENT
Start: 2018-12-10 | End: 2019-01-08

## 2018-12-10 RX ORDER — INSULIN LISPRO 100/ML
20 VIAL (ML) SUBCUTANEOUS
Qty: 0 | Refills: 0 | COMMUNITY

## 2018-12-10 RX ORDER — FUROSEMIDE 40 MG
20 TABLET ORAL ONCE
Qty: 0 | Refills: 0 | Status: COMPLETED | OUTPATIENT
Start: 2018-12-10 | End: 2018-12-10

## 2018-12-10 RX ORDER — ACETAMINOPHEN 500 MG
650 TABLET ORAL ONCE
Qty: 0 | Refills: 0 | Status: COMPLETED | OUTPATIENT
Start: 2018-12-10 | End: 2018-12-10

## 2018-12-10 RX ORDER — GABAPENTIN 400 MG/1
2 CAPSULE ORAL
Qty: 240 | Refills: 0 | OUTPATIENT
Start: 2018-12-10 | End: 2019-01-08

## 2018-12-10 RX ORDER — ERTAPENEM SODIUM 1 G/1
0 INJECTION, POWDER, LYOPHILIZED, FOR SOLUTION INTRAMUSCULAR; INTRAVENOUS
Qty: 0 | Refills: 0 | COMMUNITY
Start: 2018-12-10

## 2018-12-10 RX ORDER — DIPHENHYDRAMINE HCL 50 MG
25 CAPSULE ORAL ONCE
Qty: 0 | Refills: 0 | Status: COMPLETED | OUTPATIENT
Start: 2018-12-10 | End: 2018-12-10

## 2018-12-10 RX ORDER — INSULIN LISPRO 100/ML
30 VIAL (ML) SUBCUTANEOUS
Qty: 0 | Refills: 0 | COMMUNITY

## 2018-12-10 RX ADMIN — GABAPENTIN 200 MILLIGRAM(S): 400 CAPSULE ORAL at 23:15

## 2018-12-10 RX ADMIN — GABAPENTIN 200 MILLIGRAM(S): 400 CAPSULE ORAL at 17:04

## 2018-12-10 RX ADMIN — GABAPENTIN 200 MILLIGRAM(S): 400 CAPSULE ORAL at 05:34

## 2018-12-10 RX ADMIN — ERTAPENEM SODIUM 120 MILLIGRAM(S): 1 INJECTION, POWDER, LYOPHILIZED, FOR SOLUTION INTRAMUSCULAR; INTRAVENOUS at 11:59

## 2018-12-10 RX ADMIN — URSODIOL 300 MILLIGRAM(S): 250 TABLET, FILM COATED ORAL at 21:44

## 2018-12-10 RX ADMIN — URSODIOL 300 MILLIGRAM(S): 250 TABLET, FILM COATED ORAL at 05:34

## 2018-12-10 RX ADMIN — Medication 1: at 16:49

## 2018-12-10 RX ADMIN — Medication 1 GRAM(S): at 05:34

## 2018-12-10 RX ADMIN — Medication 650 MILLIGRAM(S): at 12:46

## 2018-12-10 RX ADMIN — PANTOPRAZOLE SODIUM 40 MILLIGRAM(S): 20 TABLET, DELAYED RELEASE ORAL at 05:34

## 2018-12-10 RX ADMIN — ATORVASTATIN CALCIUM 10 MILLIGRAM(S): 80 TABLET, FILM COATED ORAL at 21:44

## 2018-12-10 RX ADMIN — Medication 25 MILLIGRAM(S): at 12:46

## 2018-12-10 RX ADMIN — PANTOPRAZOLE SODIUM 40 MILLIGRAM(S): 20 TABLET, DELAYED RELEASE ORAL at 17:01

## 2018-12-10 RX ADMIN — Medication 1 GRAM(S): at 23:16

## 2018-12-10 RX ADMIN — Medication 20 MILLIGRAM(S): at 18:01

## 2018-12-10 RX ADMIN — LACTULOSE 15 GRAM(S): 10 SOLUTION ORAL at 17:01

## 2018-12-10 RX ADMIN — LACTULOSE 15 GRAM(S): 10 SOLUTION ORAL at 05:34

## 2018-12-10 RX ADMIN — Medication 1 GRAM(S): at 17:01

## 2018-12-10 RX ADMIN — INSULIN GLARGINE 50 UNIT(S): 100 INJECTION, SOLUTION SUBCUTANEOUS at 21:44

## 2018-12-10 NOTE — PROGRESS NOTE ADULT - SUBJECTIVE AND OBJECTIVE BOX
Patient seen and examined;  Chart reviewed and events noted;   feeling better; anticipated discharge to rehab today    MEDICATIONS  (STANDING):  atorvastatin 10 milliGRAM(s) Oral at bedtime  dextrose 5%. 1000 milliLiter(s) (50 mL/Hr) IV Continuous <Continuous>  ertapenem  IVPB 1000 milliGRAM(s) IV Intermittent every 24 hours  gabapentin 200 milliGRAM(s) Oral four times a day  insulin glargine Injectable (LANTUS) 50 Unit(s) SubCutaneous at bedtime  insulin lispro (HumaLOG) corrective regimen sliding scale   SubCutaneous three times a day before meals  lactulose Syrup 15 Gram(s) Oral two times a day  pantoprazole    Tablet 40 milliGRAM(s) Oral two times a day  rifaximin 550 milliGRAM(s) Oral two times a day  sucralfate 1 Gram(s) Oral every 6 hours  ursodiol Capsule 300 milliGRAM(s) Oral three times a day    MEDICATIONS  (PRN):  acetaminophen   Tablet .. 650 milliGRAM(s) Oral every 6 hours PRN Temp greater or equal to 38C (100.4F)  aluminum hydroxide/magnesium hydroxide/simethicone Suspension 30 milliLiter(s) Oral every 6 hours PRN Dyspepsia  dextrose 40% Gel 15 Gram(s) Oral once PRN Blood Glucose LESS THAN 70 milliGRAM(s)/deciliter  glucagon  Injectable 1 milliGRAM(s) IntraMuscular once PRN Glucose LESS THAN 70 milligrams/deciliter  lidocaine 2% Gel 1 Application(s) Topical every 3 hours PRN pain  simethicone 80 milliGRAM(s) Chew every 6 hours PRN Gas      Vital Signs Last 24 Hrs  T(C): 36.3 (10 Dec 2018 08:00), Max: 37.1 (09 Dec 2018 23:00)  T(F): 97.4 (10 Dec 2018 08:00), Max: 98.8 (09 Dec 2018 23:00)  HR: 87 (10 Dec 2018 08:00) (84 - 90)  BP: 118/65 (10 Dec 2018 08:00) (105/64 - 126/70)  RR: 18 (10 Dec 2018 08:00) (18 - 18)  SpO2: 99% (10 Dec 2018 08:00) (95% - 99%)    PHYSICAL EXAM  General: adult in NAD  HEENT: clear oropharynx, anicteric sclera, pink conjunctivae  Neck: supple  CV: normal S1S2 with no murmur rubs or gallops  Lungs: clear to auscultation, no wheezes, no rhales  Abdomen: + mildly distended  Ext: no clubbing cyanosis or edema  Skin: no rashes and no petichiae  Neuro: alert and oriented X3 no focal deficits      LABS:                        7.7    5.42  )-----------( 132      ( 10 Dec 2018 06:46 )             23.1     Hemoglobin: 7.7 g/dL (12-10 @ 06:46)  Hemoglobin: 7.9 g/dL (12-09 @ 06:09)  Hemoglobin: 7.5 g/dL (12-08 @ 05:45)  Hemoglobin: 8.1 g/dL (12-07 @ 14:24)  Hemoglobin: 7.9 g/dL (12-07 @ 05:22)    12-10    141  |  107  |  11  ----------------------------<  66<L>  3.9   |  27  |  0.92    Ca    7.7<L>      10 Dec 2018 06:46    TPro  4.8<L>  /  Alb  1.6<L>  /  TBili  1.8<H>  /  DBili  x   /  AST  32  /  ALT  23  /  AlkPhos  102  12-09

## 2018-12-10 NOTE — PROGRESS NOTE ADULT - PROBLEM SELECTOR PLAN 8
- c/w gabapentin 200mg QID 2/2 Cirrhosis -ammonia level improved ? 2/2 GI Bleed ; Pt not confused at baseline MS, no asterixis  - Ammonia level stable  on lactulose 2x day    rifaximin 550 BID

## 2018-12-10 NOTE — PROGRESS NOTE ADULT - SUBJECTIVE AND OBJECTIVE BOX
INTERVAL HPI/OVERNIGHT EVENTS:  pt seen and examined  states he feels well  denies n/v/abd pain, reports formed non bloody bm yesterday  per overnight rn no s/s active gib  afebrile overnight labs noted    MEDICATIONS  (STANDING):  atorvastatin 10 milliGRAM(s) Oral at bedtime  dextrose 5%. 1000 milliLiter(s) (50 mL/Hr) IV Continuous <Continuous>  dextrose 50% Injectable 12.5 Gram(s) IV Push once  dextrose 50% Injectable 25 Gram(s) IV Push once  dextrose 50% Injectable 25 Gram(s) IV Push once  ertapenem  IVPB 1000 milliGRAM(s) IV Intermittent every 24 hours  gabapentin 200 milliGRAM(s) Oral four times a day  insulin glargine Injectable (LANTUS) 50 Unit(s) SubCutaneous at bedtime  insulin lispro (HumaLOG) corrective regimen sliding scale   SubCutaneous three times a day before meals  lactulose Syrup 15 Gram(s) Oral two times a day  pantoprazole    Tablet 40 milliGRAM(s) Oral two times a day  rifaximin 550 milliGRAM(s) Oral two times a day  sucralfate 1 Gram(s) Oral every 6 hours  ursodiol Capsule 300 milliGRAM(s) Oral three times a day    MEDICATIONS  (PRN):  acetaminophen   Tablet .. 650 milliGRAM(s) Oral every 6 hours PRN Temp greater or equal to 38C (100.4F)  aluminum hydroxide/magnesium hydroxide/simethicone Suspension 30 milliLiter(s) Oral every 6 hours PRN Dyspepsia  dextrose 40% Gel 15 Gram(s) Oral once PRN Blood Glucose LESS THAN 70 milliGRAM(s)/deciliter  glucagon  Injectable 1 milliGRAM(s) IntraMuscular once PRN Glucose LESS THAN 70 milligrams/deciliter  lidocaine 2% Gel 1 Application(s) Topical every 3 hours PRN pain  simethicone 80 milliGRAM(s) Chew every 6 hours PRN Gas      Allergies    codeine (Anaphylaxis)    Intolerances        Review of Systems:    General:  No wt loss, fevers, chills, night sweats, fatigue   Eyes:  Good vision, no reported pain  ENT:  No sore throat, pain, runny nose, dysphagia  CV:  No pain, palpitations, hypo/hypertension  Resp:  No dyspnea, cough, tachypnea, wheezing  GI:  No pain, No nausea, No vomiting, No diarrhea, No constipation, No weight loss, No fever, No pruritis, No rectal bleeding, No melena, No dysphagia  :  No pain, bleeding, incontinence, nocturia  Muscle:  No pain, weakness  Neuro:  No weakness, tingling, memory problems  Psych:  No fatigue, insomnia, mood problems, depression  Endocrine:  No polyuria, polydypsia, cold/heat intolerance  Heme:  No petechiae, ecchymosis, easy bruisability  Skin:  No rash, tattoos, scars, edema      Vital Signs Last 24 Hrs  T(C): 36.3 (10 Dec 2018 08:00), Max: 37.1 (09 Dec 2018 23:00)  T(F): 97.4 (10 Dec 2018 08:00), Max: 98.8 (09 Dec 2018 23:00)  HR: 87 (10 Dec 2018 08:00) (84 - 90)  BP: 118/65 (10 Dec 2018 08:00) (105/64 - 126/70)  BP(mean): --  RR: 18 (10 Dec 2018 08:00) (18 - 18)  SpO2: 99% (10 Dec 2018 08:00) (95% - 99%)    PHYSICAL EXAM:  Constitutional: NAD   HEENT: ncat  Neck: No LAD  Respiratory: dec bs  Cardiovascular: S1 and S2, RRR  Gastrointestinal: obese abd soft nt mild dt  Extremities: No peripheral edema  Vascular: 2+ peripheral pulses  Neurological: awake alert responds appropriately   Skin: no rash      LABS:                        7.7    5.42  )-----------( 132      ( 10 Dec 2018 06:46 )             23.1     12-10    141  |  107  |  11  ----------------------------<  66<L>  3.9   |  27  |  0.92    Ca    7.7<L>      10 Dec 2018 06:46    TPro  4.8<L>  /  Alb  1.6<L>  /  TBili  1.8<H>  /  DBili  x   /  AST  32  /  ALT  23  /  AlkPhos  102  12-09          RADIOLOGY & ADDITIONAL TESTS:

## 2018-12-10 NOTE — PROGRESS NOTE ADULT - ASSESSMENT
Pt is a 63 y/o M with PMHx of Type 2 DM, HTN, HLD, obesity, nephrolithiasis (last stone 25 years ago), neuropathy, HE, NAFLD, who presents with vomiting and near syncope/falling out of a moving car. Admitted for vomiting and near syncope. Likely Vasovagal, Acute blood Loss anemia, S/P ICU transfer for upper GI bleed S/P EGD showed bleeding Duodenal ulcer s/p clips x2, epi injection, and cautery. 3 units pRBC given 3 days ago. Patient afebrile overnight. Hgb at 8.1 Blood cultures are positive for "Gram negative rods & Gram variable rods." Started on  INVANZ daily for E Coli Bacteremia ,ESBL+ Pt is a 63 y/o M with PMHx of Type 2 DM, HTN, HLD, obesity, nephrolithiasis (last stone 25 years ago), neuropathy, HE, NAFLD, who presents with vomiting and near syncope/falling out of a moving car. Admitted for vomiting and near syncope. Likely Vasovagal, Acute blood Loss anemia, S/P ICU transfer for upper GI bleed S/P EGD showed bleeding Duodenal ulcer s/p clips x2, epi injection, and cautery. 3 units pRBC given 4 days ago. Patient afebrile overnight. Hgb at 7.7 Blood cultures are positive for "Gram negative rods & Gram variable rods." Started on  INVANZ daily for E Coli Bacteremia ,ESBL+. PICC line to be placed today and transfer to [facility]. Patient has no acute complaints this AM. Pt is a 63 y/o M with PMHx of Type 2 DM, HTN, HLD, obesity, nephrolithiasis (last stone 25 years ago), neuropathy, HE, NAFLD, who presents with vomiting and near syncope/falling out of a moving car. Admitted for vomiting and near syncope. Likely Vasovagal, Acute blood Loss anemia, S/P ICU transfer for upper GI bleed S/P EGD showed bleeding Duodenal ulcer s/p clips x2, epi injection, and cautery. 3 units pRBC given 4 days ago. Patient afebrile overnight. Blood cultures are positive for "Gram negative rods & Gram variable rods." Started on  INVANZ daily for E Coli Bacteremia ,ESBL+. Hgb at 7.7 - Will give 1 unit prbc before discharge to [facility]  PICC line to be placed today and transfer to [facility]. Patient has no acute complaints this AM. Pt is a 61 y/o M with PMHx of Type 2 DM, HTN, HLD, obesity, nephrolithiasis (last stone 25 years ago), neuropathy, HE, NAFLD, who presents with vomiting and near syncope/falling out of a moving car. Admitted for vomiting and near syncope. Likely Vasovagal, Acute blood Loss anemia, S/P ICU transfer for upper GI bleed S/P EGD showed bleeding Duodenal ulcer s/p clips x2, epi injection, and cautery. 3 units pRBC given 4 days ago. Patient afebrile overnight. Blood cultures are positive for "Gram negative rods & Gram variable rods." Started on  INVANZ daily for E Coli Bacteremia ,ESBL+. Hgb at 7.7 - Will give 1 unit prbc,  PICC line placed for IV antibiotics. Patient has no acute complaints this AM.

## 2018-12-10 NOTE — PROGRESS NOTE ADULT - PROBLEM SELECTOR PLAN 4
resolved  likely gi source per id  repeat bld cxs ngtd  on abx  for picc line  f/u id further id recs  dc planning in progress

## 2018-12-10 NOTE — PROGRESS NOTE ADULT - PROBLEM SELECTOR PLAN 4
Anemia & Thrombocytopenia- Acute Blood loss Anemia with Bleeding DU - H/H Low stable. Hgb Low stable   S/P EGD x 2 by GI Dr Morales   -Pt had Multiple pRBC transfusions , FFP & Platelets transfusions.  - As per Dr. David dorsey - consider transfusing to Hgb of 8  Dr Duran's D/W H/H , No Transfusion as Hb > 7 Anemia & Thrombocytopenia- Acute Blood loss Anemia with Bleeding DU - H/H Low stable. Hgb Low stable at 7.7  S/P EGD x 2 by GI Dr Morales   -Pt had Multiple pRBC transfusions , FFP & Platelets transfusions.  - As per Dr. David dorsey - consider transfusing to Hgb of 8  Dr Duran's D/W H/H , No Transfusion as Hb > 7 Anemia & Thrombocytopenia- Acute Blood loss Anemia with Bleeding DU - H/H Low stable. Hgb Low stable at 7.7 - will be given 1 unit prbc before discharge to [facility] today.  S/P EGD x 2 by GI Dr Morales   -Pt had Multiple pRBC transfusions , FFP & Platelets transfusions.  - As per Dr. Hernandez recs - consider transfusing to Hgb of 8  Dr Duran's D/W H/H , No Transfusion as Hb > 7 Anemia & Thrombocytopenia- Acute Blood loss Anemia with Bleeding DU - H/H Low stable. Hgb Low stable at 7.7 - will be given 1 unit prbc before discharge to [facility] [place] today.  Total Blood Given - 10 units pRBC, 4 units FFP, 2 units platelets (not including 12/10)  S/P EGD x 2 by GI Dr Morales   -Pt had Multiple pRBC transfusions , FFP & Platelets transfusions.  - As per Dr. Hernandez recs - consider transfusing to Hgb of 8  Dr Duran's D/W H/H , No Transfusion as Hb > 7 Anemia & Thrombocytopenia- Acute Blood loss Anemia with Bleeding DU - H/H Low stable. Hgb Low stable at 7.7 - will be given 1 unit prbc  Total Blood Given - 10 units pRBC, 4 units FFP, 2 units platelets (not including 12/10)  S/P EGD x 2 by GI Dr Morales   - As per Dr. Hernandez recs - consider transfusing to Hgb of 8 -- hx of PUD, Esophagitis, Gastritis, non bleeding ulcers on last EGD10/18  -S/P  Bleeding Duodenal ulcer on Emergent 2 nd EGD on 12/3/ in ICU s/p clips x2, epi injection, and cautery. Pt had EGD done last weekend  - pt had multiple bloody BM 2/2 rapid upper GI bleed on admission-  - s/p 3 units FFP, 7 units pRBC, 1 platelets & Vit K 5 mg x 1 dose given 12/3  - GI ([facility]) following  - Dr Hernandez recs: -  If further bleeding ---> plan to transfer to [facility] for IR / surgical intervention  - c/w , Protonix 40 mg 2x day Carafate q 6 hrs   - Diet advanced to Regular carbohydrate diet w/ evening snack - tolerating well  - Daily CBC

## 2018-12-10 NOTE — PROGRESS NOTE ADULT - SUBJECTIVE AND OBJECTIVE BOX
JESSICA MARTIN is a yMale , patient examined and chart reviewed.      INTERVAL HPI/ OVERNIGHT EVENTS:  Afebrile. No events.  Denies complaints.  Decided against rehab and wants to go home.    Past Medical History--  PAST MEDICAL & SURGICAL HISTORY:  GERD with esophagitis: Gastritis &amp; Non Bleeding Ulcers  Hepatic encephalopathy  Obesity  Fatty liver disease, nonalcoholic  Renal stones: 25 years ago  Hypertension  Neuropathy  Hypercholesteremia  Diabetes  S/P cholecystectomy      For details regarding the patient's social history, family history, and other miscellaneous elements, please refer the initial infectious diseases consultation and/or the admitting history and physical examination for this admission.    ROS:  CONSTITUTIONAL:  no fever or chills, feels well, good appetite  EYES:  Negative  blurry vision or double vision  CARDIOVASCULAR:  Negative for chest pain or palpitations  RESPIRATORY:  Negative for cough, wheezing, or SOB   GASTROINTESTINAL:  Negative for nausea, vomiting, diarrhea, constipation, or abdominal pain  GENITOURINARY:  Negative frequency, urgency , dysuria or hematuria   NEUROLOGIC:  No headache, confusion, dizziness, lightheadedness  All other systems were reviewed and are negative     ALLERGIES:  codeine (Anaphylaxis)    Current inpatient medications :    ANTIBIOTICS/RELEVANT:  ertapenem  IVPB 1000 milliGRAM(s) IV Intermittent every 24 hours    MEDICATIONS  (STANDING):  atorvastatin 10 milliGRAM(s) Oral at bedtime  dextrose 5%. 1000 milliLiter(s) (50 mL/Hr) IV Continuous <Continuous>  dextrose 50% Injectable 12.5 Gram(s) IV Push once  dextrose 50% Injectable 25 Gram(s) IV Push once  dextrose 50% Injectable 25 Gram(s) IV Push once    gabapentin 200 milliGRAM(s) Oral four times a day  insulin glargine Injectable (LANTUS) 50 Unit(s) SubCutaneous at bedtime  insulin lispro (HumaLOG) corrective regimen sliding scale   SubCutaneous three times a day before meals  insulin lispro (HumaLOG) corrective regimen sliding scale   SubCutaneous at bedtime  lactulose Syrup 15 Gram(s) Oral two times a day  pantoprazole    Tablet 40 milliGRAM(s) Oral two times a day  rifaximin 550 milliGRAM(s) Oral two times a day  sucralfate 1 Gram(s) Oral every 6 hours  ursodiol Capsule 300 milliGRAM(s) Oral three times a day    MEDICATIONS  (PRN):  acetaminophen   Tablet .. 650 milliGRAM(s) Oral every 6 hours PRN Temp greater or equal to 38C (100.4F)  aluminum hydroxide/magnesium hydroxide/simethicone Suspension 30 milliLiter(s) Oral every 6 hours PRN Dyspepsia  dextrose 40% Gel 15 Gram(s) Oral once PRN Blood Glucose LESS THAN 70 milliGRAM(s)/deciliter  glucagon  Injectable 1 milliGRAM(s) IntraMuscular once PRN Glucose LESS THAN 70 milligrams/deciliter  lidocaine 2% Gel 1 Application(s) Topical every 3 hours PRN pain  simethicone 80 milliGRAM(s) Chew every 6 hours PRN Gas      Objective:  Vital Signs Last 24 Hrs  T(C): 36.4 (10 Dec 2018 15:36), Max: 37.1 (09 Dec 2018 23:00)  T(F): 97.6 (10 Dec 2018 15:36), Max: 98.8 (09 Dec 2018 23:00)  HR: 82 (10 Dec 2018 15:36) (81 - 90)  BP: 107/63 (10 Dec 2018 15:36) (105/64 - 123/69)  RR: 17 (10 Dec 2018 15:36) (17 - 18)  SpO2: 100% (10 Dec 2018 15:36) (95% - 100%)    Physical Exam:  GEN: NAD, pleasant  HEENT: normocephalic and atraumatic. EOMI. GREYSON. Moist mucosa. Clear Posterior pharynx.  NECK: Supple. No carotid bruits.  No lymphadenopathy or thyromegaly.  LUNGS: Clear to auscultation.  HEART: Regular rate and rhythm without murmur.  ABDOMEN: Soft, nontender, and nondistended.  Positive bowel sounds.  No hepatosplenomegaly was noted.  EXTREMITIES: Without any cyanosis, clubbing, rash, lesions or edema.  NEUROLOGIC: A & O x3, No focal neurological deficits   SKIN: No ulceration or induration present.      LABS:                        7.7    5.42  )-----------( 132      ( 10 Dec 2018 06:46 )             23.1   12-10    141  |  107  |  11  ----------------------------<  66<L>  3.9   |  27  |  0.92    Ca    7.7<L>      10 Dec 2018 06:46    TPro  4.8<L>  /  Alb  1.6<L>  /  TBili  1.8<H>  /  DBili  x   /  AST  32  /  ALT  23  /  AlkPhos  102  12-09    MICROBIOLOGY:    Culture - Blood (collected 07 Dec 2018 20:28)  Source: .Blood Blood-Venous  Preliminary Report (08 Dec 2018 21:01):    No growth to date.    Culture - Blood (collected 07 Dec 2018 20:24)  Source: .Blood Blood-Peripheral  Preliminary Report (08 Dec 2018 21:01):    No growth to date.    Culture - Urine (collected 07 Dec 2018 08:12)  Source: .Urine Clean Catch (Midstream)  Final Report (08 Dec 2018 15:37):    No growth    Culture - Blood (collected 07 Dec 2018 07:29)  Source: .Blood Blood  Preliminary Report (08 Dec 2018 08:01):    No growth to date.    Culture - Blood (12.06.18 @ 07:42)    -  Amikacin: S <=8    -  Ampicillin: R >16 These ampicillin results predict results for amoxicillin    -  Ampicillin/Sulbactam: R 16/8    -  Aztreonam: R >16    -  Cefazolin: R >16    -  Cefepime: R >16    -  Cefoxitin: S <=4    -  Ceftriaxone: R >32 Enterobacter, Citrobacter, and Serratia may develop resistance during prolonged therapy    -  Ciprofloxacin: R >2    -  Ertapenem: S <=0.5    -  Gentamicin: S <=1    -  Imipenem: S <=1    -  Levofloxacin: R >4    -  Meropenem: S <=1    -  Piperacillin/Tazobactam: R <=8    -  Tobramycin: S <=2    -  Trimethoprim/Sulfamethoxazole: S <=0.5/9.5    Gram Stain:   Growth in anaerobic bottle: Gram Negative Rods  Growth in aerobic bottle: Gram Negative Rods    Specimen Source: .Blood Blood-Peripheral    Organism: Escherichia coli ESBL    Culture Results:   Growth in aerobic and anaerobic bottles: Escherichia coli ESBL    Organism Identification: Escherichia coli ESBL    Method Type: LEONARDO        RADIOLOGY & ADDITIONAL STUDIES:    EXAM:  CT ANGIO ABD PELV (W)AW IC                            PROCEDURE DATE:  12/01/2018          INTERPRETATION:   Cirrhosis, coagulopathic. Evaluate for GI bleed.    CTA abdomen and pelvis prior CT abdomen 10/17/2018  190 cc Omnipaque 350 injected intravenously  Axial images augmented by coronal sagittal reformats 3-D MIP images.    Limited. Suboptimal arterial phase imaging secondary to poor bolus.  No definite evidence of active arterial extravasation. No pooling noted   on the venous phase.  Multiple upper abdominal and retroperitoneal varices similar to prior.  Status post cholecystectomy. Stable prominence of the common duct   attributed postcholecystectomy change. Liver shrunken suggestive of   cirrhosis. Pancreas spleen unremarkable.  No renal abnormalities.  No ascites.  Colonic diverticula without diverticulitis or other acute bowel   inflammation. No bowel obstruction.  Normal size prostate with calcination. Bladder not remarkable.  No acute skeletal abnormality.    Impression:    Limited. Suboptimal arterial phase.  No obvious active gastrointestinal hemorrhage. If warranted scintigraphy   might be considered for additional evaluation.  Additional findings as discussed      EXAM:  XR CHEST PORTABLE URGENT 1V                            PROCEDURE DATE:  12/06/2018          INTERPRETATION:      INDICATION: Fever syncopal episode collapse    Single frontal view of the chest compared to prior study of 12/1/2018.    FINDINGS/  IMPRESSION:       No acute consolidation. There is no pleural effusion. There is no   pneumothorax.  The cardiac silhouette is within normal limits.  There is   degenerative changes.      Assessment :   Pt is a 63 YO M with PMHx obesity, DM 2, HTN, HLD, nephrolithiasis, neuropathy, poss JEAN  admitted with GIB sp egd now with new fevers with Ecoli ESBL bacteremia  Source likely GI   UTI ruled out  CXR clear  WBC wnl  Overall stable      Plan :   Cont Invanz x 10 days  Dc home in am    D/w Dr RAMY Arreguin    Continue with present regiment.  Appropriate use of antibiotics and adverse effects reviewed.    I have discussed the above plan of care with patient/ family in detail. They expressed understanding of the the treatment plan . Risks, benefits and alternatives discussed in detail. I have asked if they have any questions or concerns and appropriately addressed them to the best of my ability .    25 minutes reviewing notes, labs data/ imaging , discussion with multidisciplinary team.    Thank you for allowing me to participate in care of your patient .        Tanya Rios MD  Infectious Disease  [phone]

## 2018-12-10 NOTE — PROGRESS NOTE ADULT - PROBLEM SELECTOR PLAN 1
- S/P fever Resolved  Tylenol prn for fevers,   Blood Cx - positive for E Coli , on Zosyn IVPB 3.375g q8h. By ID Dr Rios, Repeat Blood c/s x2-NEG  UA/UC - neg  Chest Xray - neg  ID Dr Rios D/W On IV Invanz - S/P fever Resolved  Tylenol prn for fevers,   Blood Cx - positive for E Coli , on Zosyn IVPB 3.375g q8h. By ID Dr Rios, Repeat Blood c/s x2-NEG  PICC Line to be placed today for IV Abx- transfer to [facility]/UC - neg  Chest Xray - neg  ID Dr Rios D/W On IV Invanz - S/P fever Resolved Tylenol prn for fevers  Blood Cx - positive for E Coli.   Repeat Blood c/s x2-NEG  PICC Line placed today for IV Abx  UA/UC - neg  Chest Xray - neg  ID Dr Rios D/W On IV Invanz - 1 dose IV lasix today (after 1 unit pRBC)  - restarting 20mg PO Lasix (home dose) 12/11/18 - 1 dose IV Lasix today (after 1 unit pRBC)  - restarting 20mg PO Lasix (home dose) 12/11/18

## 2018-12-10 NOTE — PROGRESS NOTE ADULT - PROBLEM SELECTOR PLAN 6
-A1C -improved, still elevated  -Hypoglycemia Protocol, Low Dose Insulin Sliding Coverage, Lantus 50 units qhs, Accu-Cheks AC&HS. -A1C -improved, still elevated  -Hypoglycemia Protocol, Low Dose Insulin Sliding Coverage, Lantus 50 units qhs, Accu-Cheks AC&HS.  - fs 61 this morning - repeat fs after apple juice was 112 -- Coagulopathy 2/2 Liver disease/ Cirrhosis  Abnormal LFT's , Bili & PT/PTT/INR stable  - Dr Rios -Hem Follow up as out pt  - pt follows GI (Dr. Morales)   -cont ppi, sandra, rifaximin bid  -Lasix 20 Mg IVP x 1 dose yesterday for b/l LE edema

## 2018-12-10 NOTE — PROGRESS NOTE ADULT - SUBJECTIVE AND OBJECTIVE BOX
Interventional Radiology Brief- Operative Note    Procedure: Midline catheter placement    Operators: Melchor Dubon M.D.    Anesthesia (type): local    Contrast: none    EBL: 2 cc    Findings/Follow up Plan of Care: midline via R basilic vein. ok for use    Specimens Removed: none    Implants: 4 fr single lumen 20 cm midline    Complications: none    Condition/Disposition: to floor    Please call Interventional Radiology with any questions, concerns, or issues.

## 2018-12-10 NOTE — PROGRESS NOTE ADULT - PROBLEM SELECTOR PLAN 10
SCD  - Dyslipidemia-  on Statin daily
SCD
SCD  - Dyslipidemia-  on Statin daily

## 2018-12-10 NOTE — PROGRESS NOTE ADULT - SUBJECTIVE AND OBJECTIVE BOX
HOSPITAL DAY #: 11     SUBJECTIVE:  Patient seen and examined at beside.  Patient with no overnight events.  Patient with no complaints at this time, states he is feeling well, ready to be d/c.  Patient tolerating regular diet.  Patient aware he is to receive PICC line to continue Invanz IV x 10 days at rehab for UTI, as per ID.  Patient denies any fever, chills, dizziness, lightheadedness, chest pain, SOB, nausea, vomiting, diarrhea.    Vital Signs Last 24 Hrs  T(C): 36.3 (10 Dec 2018 08:00), Max: 37.1 (09 Dec 2018 23:00)  T(F): 97.4 (10 Dec 2018 08:00), Max: 98.8 (09 Dec 2018 23:00)  HR: 87 (10 Dec 2018 08:00) (84 - 90)  BP: 118/65 (10 Dec 2018 08:00) (105/64 - 126/70)  BP(mean): --  RR: 18 (10 Dec 2018 08:00) (18 - 18)  SpO2: 99% (10 Dec 2018 08:00) (95% - 99%)    PHYSICAL EXAM:  GENERAL: Well-developed, well-nourished male sitting comfortably in a chair in NAD  HEAD:  Atraumatic, Normocephalic  CHEST/LUNG: CTAB; No wheezes, rales, or rhonchi  HEART: Regular rate and rhythm; No murmurs, rubs, or gallops  ABDOMEN: Soft, non-tender, non-distended; normal bowel sounds  NEUROLOGY: A&O x 3    I&O's Summary    09 Dec 2018 07:01  -  10 Dec 2018 07:00  --------------------------------------------------------  IN: 120 mL / OUT: 600 mL / NET: -480 mL    I&O's Detail    09 Dec 2018 07:01  -  10 Dec 2018 07:00  --------------------------------------------------------  IN:    Oral Fluid: 120 mL  Total IN: 120 mL    OUT:    Voided: 600 mL  Total OUT: 600 mL    Total NET: -480 mL    MEDICATIONS  (STANDING):  atorvastatin 10 milliGRAM(s) Oral at bedtime  dextrose 5%. 1000 milliLiter(s) (50 mL/Hr) IV Continuous <Continuous>  dextrose 50% Injectable 12.5 Gram(s) IV Push once  dextrose 50% Injectable 25 Gram(s) IV Push once  dextrose 50% Injectable 25 Gram(s) IV Push once  ertapenem  IVPB 1000 milliGRAM(s) IV Intermittent every 24 hours  gabapentin 200 milliGRAM(s) Oral four times a day  insulin glargine Injectable (LANTUS) 50 Unit(s) SubCutaneous at bedtime  insulin lispro (HumaLOG) corrective regimen sliding scale   SubCutaneous three times a day before meals  lactulose Syrup 15 Gram(s) Oral two times a day  pantoprazole    Tablet 40 milliGRAM(s) Oral two times a day  rifaximin 550 milliGRAM(s) Oral two times a day  sucralfate 1 Gram(s) Oral every 6 hours  ursodiol Capsule 300 milliGRAM(s) Oral three times a day    MEDICATIONS  (PRN):  acetaminophen   Tablet .. 650 milliGRAM(s) Oral every 6 hours PRN Temp greater or equal to 38C (100.4F)  acetaminophen   Tablet .. 650 milliGRAM(s) Oral once PRN Mild Pain (1 - 3)  aluminum hydroxide/magnesium hydroxide/simethicone Suspension 30 milliLiter(s) Oral every 6 hours PRN Dyspepsia  dextrose 40% Gel 15 Gram(s) Oral once PRN Blood Glucose LESS THAN 70 milliGRAM(s)/deciliter  diphenhydrAMINE 25 milliGRAM(s) Oral once PRN Rash and/or Itching  glucagon  Injectable 1 milliGRAM(s) IntraMuscular once PRN Glucose LESS THAN 70 milligrams/deciliter  lidocaine 2% Gel 1 Application(s) Topical every 3 hours PRN pain  simethicone 80 milliGRAM(s) Chew every 6 hours PRN Gas    LABS:                        7.7    5.42  )-----------( 132      ( 10 Dec 2018 06:46 )             23.1     12-10    141  |  107  |  11  ----------------------------<  66<L>  3.9   |  27  |  0.92    Ca    7.7<L>      10 Dec 2018 06:46    TPro  4.8<L>  /  Alb  1.6<L>  /  TBili  1.8<H>  /  DBili  x   /  AST  32  /  ALT  23  /  AlkPhos  102  12-09    ASSESSMENT  63 year old male admitted for GIB (now resolved), s/p endoscopy, found to have large duodenal ulcer (hemostasis with epi, bleeding cauterized, 4 clips placed), patient now asymptomatic, H/H stable.    PLAN  - No surgical intervention at this time  - Trend H/H, continue diet  - Rest of care per primary team  - Case to be discussed with Dr. Hernandez

## 2018-12-10 NOTE — PROGRESS NOTE ADULT - PROBLEM SELECTOR PLAN 3
see above, suspected JEAN  no ascites on CT  cont ppi, rifaximin, sandra, lactulose  trend lfts  low na/pro diet  supportive care  outpatient gi f/u

## 2018-12-10 NOTE — PROGRESS NOTE ADULT - PROBLEM SELECTOR PLAN 2
ESBL E Coli Bacteremia -Likely GI Source from ulcer   Repeat Blood c/s x2 -NEG  On IV Invanz IV Daily as per ID, Plan for PICC Line for IV Abx ESBL E Coli Bacteremia -Likely GI Source from ulcer   Repeat Blood c/s x2 -NEG  On IV Invanz IV Daily as per ID, \ ESBL E Coli Bacteremia -Likely GI Source from ulcer   Repeat Blood c/s x2 -NEG  On IV Invanz IV Daily as per ID  PICC line - S/P fever Resolved Tylenol prn for fevers  Blood Cx - positive for E Coli.   Repeat Blood c/s x2-NEG  PICC Line placed today for IV Abx  UA/UC - neg  Chest Xray - neg  ID Dr Rios D/W On IV Invanz S/P fever ,resolved ,Nl WBC  -ESBL E Coli Bacteremia -Likely GI Source from ulcer   Repeat Blood c/s x2 -NEG  On IV Invanz IV Daily as per ID total 10 days.  MID line Placed by IR

## 2018-12-10 NOTE — PROGRESS NOTE ADULT - ASSESSMENT
63 yo man w JEAN/NAFLD assoc cirrhosis, baseline bilirubin 2.7, adm after near syncope and found  anemia with GI bleed due to large duodenal ulcer; s/p EGD and injection of ulcer with epinephrine  Also history hx of coagulopathy due to cirrhosis and baseline chronic thrombocytopenia  Workup show adequate B12/folate/iron  On antibiotics for E coli bacteremia without UTI    -CBC stable, no signs of active bleed  - planned for discharge to rehab today; okay to transfuse 1 unit pRBC although Hg has remained stabe  -symptomatic management from Heme standpoint; okay to discharge to rehab today

## 2018-12-10 NOTE — PROGRESS NOTE ADULT - SUBJECTIVE AND OBJECTIVE BOX
Patient is a 63y old  Male who presents with a chief complaint of vomiting and near syncope (09 Dec 2018 19:48)      FROM ADMISSION H+P:   HPI:  Pt is a 61 y/o M with PMHx of Type 2 DM, HTN, HLD, obesity, nephrolithiasis (last stone 25 years ago), neuropathy, HE, NAFLD, Hyper ammonemia, recent Gastritis/ esophagitis on EGD & Gram variable teddy bacteremia in 10/2018  who presents with vomiting and  near syncope/falling out of a moving car. Pt [place] and [place] "[place]", his vision was cloudy, he pulled over the car and stopped and vomited. Vomitus was "creamy white." Pt denies seeing blood in vomitus. His dtr then took over driving and Pt again felt like he needed to vomit and told dtr to pull over. He thought the car had stopped and he opened the door and fell out of the moving car. He laid on the ground for ~ 15 minutes. Pt says he possibly lost consciousness. States when he was lying on the ground felt very cold. Pt has had "terrible heartburn" for 2 days and felt chills for 2 days. Pt denies abdominal pain, CP, palp, SOB, cough, myalgias, rash, HA. Pt admits to chills, vomiting (4 episodes), diarrhea (from lactulose), dizziness, pedal edema, tingling in hands (chronic, intermittent). Pt denies recent travel, sick contacts, different eating patterns.    Pt states that early this week pt saw her Endocrinologist & started back on Metformin 500 mg daily as per Dr's recommendation.  In ED Pt's vitals were: T(C): 36.3, HR: 91, BP: 85/59, RR: 15, SpO2: 98% on RA  Labs significant for lactate 5.6, , Hgb 10.4, total bili 3.7, alk phos 183, ammonia 72, Mg 1.4. UA - trace LE, mod blood, RBC 6-10, urine glucose 1,000.   CXR - negative chest  Abd Xray - There is mild content in the colon and no overt sense of bowel obstruction.  CT head - no intracranial hemorrhage or mass effect  CT cervical - Multilevel degenerative disc disease/cervical spondylosis, with posterior osteophyte disc complex C6-7 resulting in flattening of the cervical cord. If there is a clinical concern for spinal cord injury, recommend further evaluation with MRI if there are no clinical contraindications.  EKG - NSR, 93bpm    Pt given 2L NS bolus, Zofran pantoprazole, famotidine, Carafate , Pt seen by GI Dr Morales in ER & was admitted for further Eval. (30 Nov 2018 18:20)    12/1/18: Pt seen, examined, Had total 4 bloody BM so far , Lactate improved, pt had CT A/P with IV Contrast. Drop in H/H. plan for, start Protonix drip,  2 u pRBC 1 u FFP & 5 mg Vit K x 1 as d/w Dr Franks -GI, Elevated ammonia level. Coagulopathy, Improving Lactate. Ac Blood loss Anemia.    12/2/18: Pt seen, Examined, pt was transferred to ICU on 12/1/18 for symptomatic upper GI Bleed, S/P 7  units pRBC in Total , 3 FFP, 1 platelets transfusion given, Pt had emergent EGD done that found Ac Bleeding Duodenal ulcer found. S/P Epi given. Homeostasis obtained, Pt seen By Sx Dr Hernandez. H/H Stable,  today with MISA, Coagulopathy , Leukocytosis & High Lactate level.  H/H Low stable with Ac Blood  Loss Anemia. Hematology Dr Rios called, as pt is followed by Dr Rios- H/O as out pt.    12/3/18:. Patient denies any acute complaints this morning. Hgb drop from 8.2 --> 7.7. Leukocytosis present at 15.08. Lactate level drop from 3.2 --> 2.5    12/4/19: Patient was sitting out of bed in chair, awake, alert, and oriented x3. Hb dropped to 6.6.  Patient states he is feeling better since yesterday. Patient had EGD done yesterday that showed large bleeding Duodenal ulcer s/p clips x2, epi injection, and cautery. Patient was given 3 units of packed red blood cells yesterday. Hbg increased from 7.3 --> 7.8. WBC count drop from 12.53 --> 9.47. Patient tolerating ice chips and clear liquid diet. Patient denies fever, chills, headache, weakness chest pain, palpitations, sob, N/V, diarrhea, constipation, blood in stool, urinary symptoms. Patient admits to feeling fatigued. Has not had a BM today.     12/5/18: , patient lying comfortably in bed. Hgb at 7.8. WBC 9.35, patient tolerating clear liquid diet. Patient denies fevers, chills, CP, palpitations, N/V/D. As per nurse, BM are still dark. Patient is asymptomatic at this time. On Regular diet.     12/6/18: . Patient lying comfortably in bed. Patient was febrile overnight 102F. Tylenol prn, AVOID NSAIDS (risk of rebleed). Hgb drop 7.8 --> 7.4. repeat H/H 8.6.  WBC 8.63. Creatinine up .98-->1.3. Tolerating regular carbohydrate diet, ate breakfast this morning. States that this was his second meal and has not had any associated N/V. Patient states he had a BM earlier this morning that was normal in consistency without any blood in stool. Patient is asymptomatic at this time. Patients BP is in 100/50's this AM, patient states that his pressure normal runs between "100-110". Patient denies headache, fatigue, weakness, chest pain, sob, palpitations, N/V, abdominal pain, diarrhea, constipation. PT saw aptient today and alerted patient felt light-headed. Will re-assess. ID Dr Rios called. Repeat H/H stable.    12/7/18: Patient lying comfortably in bed. Has no acute complaints this morning. Patient was afebrile overnight.  fever last night ,Blood cultures are positive for "Gram negative rods & Gram variable rods." Started on Zosyn IVPB 3.375g q8h.  Hgb at 8.1. K+ drop from 3.6 -->3.1. Repleted with 40 meq Kcl tablet q4h for 2 doses. Patient reports black BM yesterday. No BM this morning. Patient admits to fatigue. Denies headache, fever, chills, SOB, chest pain, palpitations, abdominal pain, diarrhea, constipation, blood in stool, urinary symptoms.   12/8/18: Pt feels better, tired, On IV Abx, H/H low stable.  12/9/18: Pt seen, Examined, No Complaints, ON IV INVANZ for ESBL E COLI. Low stable H/H, S/P Lasix 20 mh x 1 dose for foot edema.    12/10/18:       ----  INTERVAL HPI/OVERNIGHT EVENTS: Pt seen and evaluated at the bedside. No acute overnight events occurred.     ----  PAST MEDICAL & SURGICAL HISTORY:  GERD with esophagitis: Gastritis &amp; Non Bleeding Ulcers  Hepatic encephalopathy  Obesity  Fatty liver disease, nonalcoholic  Renal stones: 25 years ago  Hypertension  Neuropathy  Hypercholesteremia  Diabetes  S/P cholecystectomy      FAMILY HISTORY:  Family history of type 2 diabetes mellitus (Mother)  Family history of hypertension (Father)  Family history of stomach cancer (Father)      ----  MEDICATIONS  (STANDING):  atorvastatin 10 milliGRAM(s) Oral at bedtime  dextrose 5%. 1000 milliLiter(s) (50 mL/Hr) IV Continuous <Continuous>  dextrose 50% Injectable 12.5 Gram(s) IV Push once  dextrose 50% Injectable 25 Gram(s) IV Push once  dextrose 50% Injectable 25 Gram(s) IV Push once  ertapenem  IVPB 1000 milliGRAM(s) IV Intermittent every 24 hours  gabapentin 200 milliGRAM(s) Oral four times a day  insulin glargine Injectable (LANTUS) 50 Unit(s) SubCutaneous at bedtime  insulin lispro (HumaLOG) corrective regimen sliding scale   SubCutaneous three times a day before meals  lactulose Syrup 15 Gram(s) Oral two times a day  pantoprazole    Tablet 40 milliGRAM(s) Oral two times a day  rifaximin 550 milliGRAM(s) Oral two times a day  sucralfate 1 Gram(s) Oral every 6 hours  ursodiol Capsule 300 milliGRAM(s) Oral three times a day    MEDICATIONS  (PRN):  acetaminophen   Tablet .. 650 milliGRAM(s) Oral every 6 hours PRN Temp greater or equal to 38C (100.4F)  aluminum hydroxide/magnesium hydroxide/simethicone Suspension 30 milliLiter(s) Oral every 6 hours PRN Dyspepsia  dextrose 40% Gel 15 Gram(s) Oral once PRN Blood Glucose LESS THAN 70 milliGRAM(s)/deciliter  glucagon  Injectable 1 milliGRAM(s) IntraMuscular once PRN Glucose LESS THAN 70 milligrams/deciliter  lidocaine 2% Gel 1 Application(s) Topical every 3 hours PRN pain  simethicone 80 milliGRAM(s) Chew every 6 hours PRN Gas      ----  REVIEW OF SYSTEMS:  CONSTITUTIONAL: denies fever, chills, fatigue, weakness  HEENT: denies blurred vision, sore throat  SKIN: denies new lesions, rash  CARDIOVASCULAR: denies chest pain, chest pressure, palpitations  RESPIRATORY: denies shortness of breath, sputum production  GASTROINTESTINAL: denies nausea, vomiting, diarrhea, abdominal pain  GENITOURINARY: denies dysuria, discharge  NEUROLOGICAL: denies numbness, headache, focal weakness  MUSCULOSKELETAL: denies new joint pain, muscle aches  HEMATOLOGIC: denies gross bleeding, bruising  LYMPHATICS: denies enlarged lymph nodes, extremity swelling  PSYCHIATRIC: denies recent changes in anxiety, depression  ENDOCRINOLOGIC: denies sweating, cold or heat intolerance    ----  PHYSICAL EXAM:  GENERAL: patient appears well, no acute distress, appropriate, pleasant  EYES: sclera clear, no exudates  ENMT: oropharynx clear without erythema, no exudates, moist mucous membranes  NECK: supple, soft, no thyromegaly noted  LUNGS: good air entry bilaterally, clear to auscultation, symmetric breath sounds, no wheezing or rhonchi appreciated  HEART: soft S1/S2, regular rate and rhythm, no murmurs noted, no lower extremity edema  GASTROINTESTINAL: abdomen is soft, nontender, nondistended, normoactive bowel sounds, no palpable masses  INTEGUMENT: good skin turgor, no lesions noted  MUSCULOSKELETAL: no clubbing or cyanosis, no obvious deformity  NEUROLOGIC: awake, alert, oriented x3, good muscle tone in 4 extremities, no obvious sensory deficits  PSYCHIATRIC: mood is good, affect is congruent, linear and logical thought process  HEME/LYMPH: no palpable supraclavicular nodules, no obvious ecchymosis or petechiae     T(C): 36.6 (12-10-18 @ 04:00), Max: 37.1 (12-09-18 @ 23:00)  HR: 84 (12-10-18 @ 04:00) (84 - 90)  BP: 105/64 (12-10-18 @ 04:00) (105/64 - 126/70)  RR: 18 (12-10-18 @ 04:00) (18 - 18)  SpO2: 95% (12-10-18 @ 04:00) (95% - 98%)  Wt(kg): --    ----  I&O's Summary    09 Dec 2018 07:01  -  10 Dec 2018 07:00  --------------------------------------------------------  IN: 120 mL / OUT: 600 mL / NET: -480 mL        LABS:                        7.7    5.42  )-----------( 132      ( 10 Dec 2018 06:46 )             23.1     12-10    141  |  107  |  11  ----------------------------<  66<L>  3.9   |  27  |  0.92    Ca    7.7<L>      10 Dec 2018 06:46    TPro  4.8<L>  /  Alb  1.6<L>  /  TBili  1.8<H>  /  DBili  x   /  AST  32  /  ALT  23  /  AlkPhos  102  12-09        CAPILLARY BLOOD GLUCOSE      POCT Blood Glucose.: 112 mg/dL (10 Dec 2018 08:15)  POCT Blood Glucose.: 61 mg/dL (10 Dec 2018 07:33)  POCT Blood Glucose.: 171 mg/dL (09 Dec 2018 22:06)  POCT Blood Glucose.: 146 mg/dL (09 Dec 2018 17:08)  POCT Blood Glucose.: 146 mg/dL (09 Dec 2018 12:05)      12-07 @ 20:28   No growth to date.  --  --  12-07 @ 20:24   No growth to date.  --  --  12-07 @ 08:12   No growth  --  --  12-07 @ 07:29   No growth to date.  --  --            ----  Personally reviewed:  Vital sign trends: [  ] yes    [  ] no     [  ] n/a  Laboratory results: [  ] yes    [  ] no     [  ] n/a  Radiology results: [  ] yes    [  ] no     [  ] n/a  Culture results: [  ] yes    [  ] no     [  ] n/a  Consultant recommendations: [  ] yes    [  ] no     [  ] n/a Patient is a 63y old  Male who presents with a chief complaint of vomiting and near syncope (09 Dec 2018 19:48)      FROM ADMISSION H+P:   HPI:  Pt is a 63 y/o M with PMHx of Type 2 DM, HTN, HLD, obesity, nephrolithiasis (last stone 25 years ago), neuropathy, HE, NAFLD, Hyper ammonemia, recent Gastritis/ esophagitis on EGD & Gram variable teddy bacteremia in 10/2018  who presents with vomiting and  near syncope/falling out of a moving car. Pt [place] and [place]", his vision was cloudy, he pulled over the car and stopped and vomited. Vomitus was "creamy white." Pt denies seeing blood in vomitus. His dtr then took over driving and Pt again felt like he needed to vomit and told dtr to pull over. He thought the car had stopped and he opened the door and fell out of the moving car. He laid on the ground for ~ 15 minutes. Pt says he possibly lost consciousness. States when he was lying on the ground felt very cold. Pt has had "terrible heartburn" for 2 days and felt chills for 2 days. Pt denies abdominal pain, CP, palp, SOB, cough, myalgias, rash, HA. Pt admits to chills, vomiting (4 episodes), diarrhea (from lactulose), dizziness, pedal edema, tingling in hands (chronic, intermittent). Pt denies recent travel, sick contacts, different eating patterns.    Pt states that early this week pt saw her Endocrinologist & started back on Metformin 500 mg daily as per Dr's recommendation.  In ED Pt's vitals were: T(C): 36.3, HR: 91, BP: 85/59, RR: 15, SpO2: 98% on RA  Labs significant for lactate 5.6, , Hgb 10.4, total bili 3.7, alk phos 183, ammonia 72, Mg 1.4. UA - trace LE, mod blood, RBC 6-10, urine glucose 1,000.   CXR - negative chest  Abd Xray - There is mild content in the colon and no overt sense of bowel obstruction.  CT head - no intracranial hemorrhage or mass effect  CT cervical - Multilevel degenerative disc disease/cervical spondylosis, with posterior osteophyte disc complex C6-7 resulting in flattening of the cervical cord. If there is a clinical concern for spinal cord injury, recommend further evaluation with MRI if there are no clinical contraindications.  EKG - NSR, 93bpm    Pt given 2L NS bolus, Zofran pantoprazole, famotidine, Carafate , Pt seen by GI Dr Morales in ER & was admitted for further Eval. (30 Nov 2018 18:20)    INTERVAL HPI:  12/1/18: Pt seen, examined, Had total 4 bloody BM so far , Lactate improved, pt had CT A/P with IV Contrast. Drop in H/H. plan for, start Protonix drip,  2 u pRBC 1 u FFP & 5 mg Vit K x 1 as d/w Dr Franks -GI, Elevated ammonia level. Coagulopathy, Improving Lactate. Ac Blood loss Anemia.    12/2/18: Pt seen, Examined, pt was transferred to ICU on 12/1/18 for symptomatic upper GI Bleed, S/P 7  units pRBC in Total , 3 FFP, 1 platelets transfusion given, Pt had emergent EGD done that found Ac Bleeding Duodenal ulcer found. S/P Epi given. Homeostasis obtained, Pt seen By Sx Dr Hernandez. H/H Stable,  today with MISA, Coagulopathy , Leukocytosis & High Lactate level.  H/H Low stable with Ac Blood  Loss Anemia. Hematology Dr Rios called, as pt is followed by Dr Rios- H/O as out pt.    12/3/18:. Patient denies any acute complaints this morning. Hgb drop from 8.2 --> 7.7. Leukocytosis present at 15.08. Lactate level drop from 3.2 --> 2.5    12/4/19: Patient was sitting out of bed in chair, awake, alert, and oriented x3. Hb dropped to 6.6.  Patient states he is feeling better since yesterday. Patient had EGD done yesterday that showed large bleeding Duodenal ulcer s/p clips x2, epi injection, and cautery. Patient was given 3 units of packed red blood cells yesterday. Hbg increased from 7.3 --> 7.8. WBC count drop from 12.53 --> 9.47. Patient tolerating ice chips and clear liquid diet. Patient denies fever, chills, headache, weakness chest pain, palpitations, sob, N/V, diarrhea, constipation, blood in stool, urinary symptoms. Patient admits to feeling fatigued. Has not had a BM today.     12/5/18: , patient lying comfortably in bed. Hgb at 7.8. WBC 9.35, patient tolerating clear liquid diet. Patient denies fevers, chills, CP, palpitations, N/V/D. As per nurse, BM are still dark. Patient is asymptomatic at this time. On Regular diet.     12/6/18: . Patient lying comfortably in bed. Patient was febrile overnight 102F. Tylenol prn, AVOID NSAIDS (risk of rebleed). Hgb drop 7.8 --> 7.4. repeat H/H 8.6.  WBC 8.63. Creatinine up .98-->1.3. Tolerating regular carbohydrate diet, ate breakfast this morning. States that this was his second meal and has not had any associated N/V. Patient states he had a BM earlier this morning that was normal in consistency without any blood in stool. Patient is asymptomatic at this time. Patients BP is in 100/50's this AM, patient states that his pressure normal runs between "100-110". Patient denies headache, fatigue, weakness, chest pain, sob, palpitations, N/V, abdominal pain, diarrhea, constipation. PT saw aptient today and alerted patient felt light-headed. Will re-assess. ID Dr Rios called. Repeat H/H stable.    12/7/18: Patient lying comfortably in bed. Has no acute complaints this morning. Patient was afebrile overnight.  fever last night ,Blood cultures are positive for "Gram negative rods & Gram variable rods." Started on Zosyn IVPB 3.375g q8h.  Hgb at 8.1. K+ drop from 3.6 -->3.1. Repleted with 40 meq Kcl tablet q4h for 2 doses. Patient reports black BM yesterday. No BM this morning. Patient admits to fatigue. Denies headache, fever, chills, SOB, chest pain, palpitations, abdominal pain, diarrhea, constipation, blood in stool, urinary symptoms, fatigue, weakness.     12/8/18: Pt feels better, tired, On IV Abx, H/H low stable.    12/9/18: Pt seen, Examined, No Complaints, ON IV INVANZ for ESBL E COLI. Low stable H/H, S/P Lasix 20 mh x 1 dose for foot edema.    12/10/18: Patient seen and examined at bedside this morning. Patient states, "I feel great". Patient had a normal brown BM yesterday without blood. Patient scheduled for PICC line this AM following transfer to [facility]. Patient's finger stick accucheck 61 this AM - patient has apple juice and repeat finger stick 112. Hgb low/stable at 7.7. Patient denies headache, fever, chills, chest pain, palpitations, sob, abdominal pain, N/V, diarrhea, constipation, urinary symptoms.       ----  OVERNIGHT EVENTS: None    ----  PAST MEDICAL & SURGICAL HISTORY:  GERD with esophagitis: Gastritis &amp; Non Bleeding Ulcers  Hepatic encephalopathy  Obesity  Fatty liver disease, nonalcoholic  Renal stones: 25 years ago  Hypertension  Neuropathy  Hypercholesteremia  Diabetes  S/P cholecystectomy      FAMILY HISTORY:  Family history of type 2 diabetes mellitus (Mother)  Family history of hypertension (Father)  Family history of stomach cancer (Father)      ----  MEDICATIONS  (STANDING):  atorvastatin 10 milliGRAM(s) Oral at bedtime  dextrose 5%. 1000 milliLiter(s) (50 mL/Hr) IV Continuous <Continuous>  dextrose 50% Injectable 12.5 Gram(s) IV Push once  dextrose 50% Injectable 25 Gram(s) IV Push once  dextrose 50% Injectable 25 Gram(s) IV Push once  ertapenem  IVPB 1000 milliGRAM(s) IV Intermittent every 24 hours  gabapentin 200 milliGRAM(s) Oral four times a day  insulin glargine Injectable (LANTUS) 50 Unit(s) SubCutaneous at bedtime  insulin lispro (HumaLOG) corrective regimen sliding scale   SubCutaneous three times a day before meals  lactulose Syrup 15 Gram(s) Oral two times a day  pantoprazole    Tablet 40 milliGRAM(s) Oral two times a day  rifaximin 550 milliGRAM(s) Oral two times a day  sucralfate 1 Gram(s) Oral every 6 hours  ursodiol Capsule 300 milliGRAM(s) Oral three times a day    MEDICATIONS  (PRN):  acetaminophen   Tablet .. 650 milliGRAM(s) Oral every 6 hours PRN Temp greater or equal to 38C (100.4F)  aluminum hydroxide/magnesium hydroxide/simethicone Suspension 30 milliLiter(s) Oral every 6 hours PRN Dyspepsia  dextrose 40% Gel 15 Gram(s) Oral once PRN Blood Glucose LESS THAN 70 milliGRAM(s)/deciliter  glucagon  Injectable 1 milliGRAM(s) IntraMuscular once PRN Glucose LESS THAN 70 milligrams/deciliter  lidocaine 2% Gel 1 Application(s) Topical every 3 hours PRN pain  simethicone 80 milliGRAM(s) Chew every 6 hours PRN Gas      ----  REVIEW OF SYSTEMS:  CONSTITUTIONAL: denies fever, chills, fatigue, weakness  HEENT: denies blurred vision, sore throat  SKIN: denies new lesions, rash  CARDIOVASCULAR: denies chest pain, chest pressure, palpitations  RESPIRATORY: denies shortness of breath, sputum production  GASTROINTESTINAL: denies nausea, vomiting, diarrhea, abdominal pain  GENITOURINARY: denies dysuria, discharge  NEUROLOGICAL: denies numbness, headache, focal weakness  MUSCULOSKELETAL: denies new joint pain, muscle aches  HEMATOLOGIC: denies gross bleeding, bruising  LYMPHATICS: denies enlarged lymph nodes, b/l lower extremity swelling  PSYCHIATRIC: denies recent changes in anxiety, depression  ENDOCRINOLOGIC: denies sweating, cold or heat intolerance    ----  PHYSICAL EXAM:  GENERAL: patient appears well, no acute distress, appropriate, pleasant  EYES: sclera clear, no exudates  ENMT: oropharynx clear without erythema, no exudates, moist mucous membranes  NECK: supple, soft, no thyromegaly noted  LUNGS: good air entry bilaterally, clear to auscultation, symmetric breath sounds, no wheezing or rhonchi appreciated  HEART: soft S1/S2, regular rate and rhythm, no murmurs noted, b/l lower extremity edema  GASTROINTESTINAL: abdomen is soft, nontender, nondistended, normoactive bowel sounds, no palpable masses  INTEGUMENT: good skin turgor, no lesions noted  MUSCULOSKELETAL: no clubbing or cyanosis, no obvious deformity  NEUROLOGIC: awake, alert, oriented x3, good muscle tone in 4 extremities, no obvious sensory deficits  PSYCHIATRIC: mood is good, affect is congruent, linear and logical thought process  HEME/LYMPH: no palpable supraclavicular nodules, no obvious ecchymosis or petechiae     T(C): 36.6 (12-10-18 @ 04:00), Max: 37.1 (12-09-18 @ 23:00)  HR: 84 (12-10-18 @ 04:00) (84 - 90)  BP: 105/64 (12-10-18 @ 04:00) (105/64 - 126/70)  RR: 18 (12-10-18 @ 04:00) (18 - 18)  SpO2: 95% (12-10-18 @ 04:00) (95% - 98%)  Wt(kg): --    ----  I&O's Summary    09 Dec 2018 07:01  -  10 Dec 2018 07:00  --------------------------------------------------------  IN: 120 mL / OUT: 600 mL / NET: -480 mL        LABS:                        7.7    5.42  )-----------( 132      ( 10 Dec 2018 06:46 )             23.1     12-10    141  |  107  |  11  ----------------------------<  66<L>  3.9   |  27  |  0.92    Ca    7.7<L>      10 Dec 2018 06:46    TPro  4.8<L>  /  Alb  1.6<L>  /  TBili  1.8<H>  /  DBili  x   /  AST  32  /  ALT  23  /  AlkPhos  102  12-09        CAPILLARY BLOOD GLUCOSE      POCT Blood Glucose.: 112 mg/dL (10 Dec 2018 08:15)  POCT Blood Glucose.: 61 mg/dL (10 Dec 2018 07:33)  POCT Blood Glucose.: 171 mg/dL (09 Dec 2018 22:06)  POCT Blood Glucose.: 146 mg/dL (09 Dec 2018 17:08)  POCT Blood Glucose.: 146 mg/dL (09 Dec 2018 12:05)      12-07 @ 20:28   No growth to date.  --  --  12-07 @ 20:24   No growth to date.  --  --  12-07 @ 08:12   No growth  --  --  12-07 @ 07:29   No growth to date.  --  --            ----  Personally reviewed:  Vital sign trends: [ x ] yes    [  ] no     [  ] n/a  Laboratory results: [ x ] yes    [  ] no     [  ] n/a  Radiology results: [ x ] yes    [  ] no     [  ] n/a  Culture results: [ x ] yes    [  ] no     [  ] n/a  Consultant recommendations: [ x ] yes    [  ] no     [  ] n/a Patient is a 63y old  Male who presents with a chief complaint of vomiting and near syncope (09 Dec 2018 19:48)      FROM ADMISSION H+P:   HPI:  Pt is a 63 y/o M with PMHx of Type 2 DM, HTN, HLD, obesity, nephrolithiasis (last stone 25 years ago), neuropathy, HE, NAFLD, Hyper ammonemia, recent Gastritis/ esophagitis on EGD & Gram variable teddy bacteremia in 10/2018  who presents with vomiting and  near syncope/falling out of a moving car. Pt [place] and [place]", his vision was cloudy, he pulled over the car and stopped and vomited. Vomitus was "creamy white." Pt denies seeing blood in vomitus. His dtr then took over driving and Pt again felt like he needed to vomit and told dtr to pull over. He thought the car had stopped and he opened the door and fell out of the moving car. He laid on the ground for ~ 15 minutes. Pt says he possibly lost consciousness. States when he was lying on the ground felt very cold. Pt has had "terrible heartburn" for 2 days and felt chills for 2 days. Pt denies abdominal pain, CP, palp, SOB, cough, myalgias, rash, HA. Pt admits to chills, vomiting (4 episodes), diarrhea (from lactulose), dizziness, pedal edema, tingling in hands (chronic, intermittent). Pt denies recent travel, sick contacts, different eating patterns.    Pt states that early this week pt saw her Endocrinologist & started back on Metformin 500 mg daily as per Dr's recommendation.  In ED Pt's vitals were: T(C): 36.3, HR: 91, BP: 85/59, RR: 15, SpO2: 98% on RA  Labs significant for lactate 5.6, , Hgb 10.4, total bili 3.7, alk phos 183, ammonia 72, Mg 1.4. UA - trace LE, mod blood, RBC 6-10, urine glucose 1,000.   CXR - negative chest  Abd Xray - There is mild content in the colon and no overt sense of bowel obstruction.  CT head - no intracranial hemorrhage or mass effect  CT cervical - Multilevel degenerative disc disease/cervical spondylosis, with posterior osteophyte disc complex C6-7 resulting in flattening of the cervical cord. If there is a clinical concern for spinal cord injury, recommend further evaluation with MRI if there are no clinical contraindications.  EKG - NSR, 93bpm    Pt given 2L NS bolus, Zofran pantoprazole, famotidine, Carafate , Pt seen by GI Dr Morales in ER & was admitted for further Eval. (30 Nov 2018 18:20)    INTERVAL HPI:  12/1/18: Pt seen, examined, Had total 4 bloody BM so far , Lactate improved, pt had CT A/P with IV Contrast. Drop in H/H. plan for, start Protonix drip,  2 u pRBC 1 u FFP & 5 mg Vit K x 1 as d/w Dr Franks -GI, Elevated ammonia level. Coagulopathy, Improving Lactate. Ac Blood loss Anemia.    12/2/18: Pt seen, Examined, pt was transferred to ICU on 12/1/18 for symptomatic upper GI Bleed, S/P 7  units pRBC in Total , 3 FFP, 1 platelets transfusion given, Pt had emergent EGD done that found Ac Bleeding Duodenal ulcer found. S/P Epi given. Homeostasis obtained, Pt seen By Sx Dr Hernandez. H/H Stable,  today with MISA, Coagulopathy , Leukocytosis & High Lactate level.  H/H Low stable with Ac Blood  Loss Anemia. Hematology Dr Rios called, as pt is followed by Dr Rios- H/O as out pt.    12/3/18:. Patient denies any acute complaints this morning. Hgb drop from 8.2 --> 7.7. Leukocytosis present at 15.08. Lactate level drop from 3.2 --> 2.5    12/4/19: Patient was sitting out of bed in chair, awake, alert, and oriented x3. Hb dropped to 6.6.  Patient states he is feeling better since yesterday. Patient had EGD done yesterday that showed large bleeding Duodenal ulcer s/p clips x2, epi injection, and cautery. Patient was given 3 units of packed red blood cells yesterday. Hbg increased from 7.3 --> 7.8. WBC count drop from 12.53 --> 9.47. Patient tolerating ice chips and clear liquid diet. Patient denies fever, chills, headache, weakness chest pain, palpitations, sob, N/V, diarrhea, constipation, blood in stool, urinary symptoms. Patient admits to feeling fatigued. Has not had a BM today.     12/5/18: , patient lying comfortably in bed. Hgb at 7.8. WBC 9.35, patient tolerating clear liquid diet. Patient denies fevers, chills, CP, palpitations, N/V/D. As per nurse, BM are still dark. Patient is asymptomatic at this time. On Regular diet.     12/6/18: . Patient lying comfortably in bed. Patient was febrile overnight 102F. Tylenol prn, AVOID NSAIDS (risk of rebleed). Hgb drop 7.8 --> 7.4. repeat H/H 8.6.  WBC 8.63. Creatinine up .98-->1.3. Tolerating regular carbohydrate diet, ate breakfast this morning. States that this was his second meal and has not had any associated N/V. Patient states he had a BM earlier this morning that was normal in consistency without any blood in stool. Patient is asymptomatic at this time. Patients BP is in 100/50's this AM, patient states that his pressure normal runs between "100-110". Patient denies headache, fatigue, weakness, chest pain, sob, palpitations, N/V, abdominal pain, diarrhea, constipation. PT saw aptient today and alerted patient felt light-headed. Will re-assess. ID Dr Rios called. Repeat H/H stable.    12/7/18: Patient lying comfortably in bed. Has no acute complaints this morning. Patient was afebrile overnight.  fever last night ,Blood cultures are positive for "Gram negative rods & Gram variable rods." Started on Zosyn IVPB 3.375g q8h.  Hgb at 8.1. K+ drop from 3.6 -->3.1. Repleted with 40 meq Kcl tablet q4h for 2 doses. Patient reports black BM yesterday. No BM this morning. Patient admits to fatigue. Denies headache, fever, chills, SOB, chest pain, palpitations, abdominal pain, diarrhea, constipation, blood in stool, urinary symptoms, fatigue, weakness.     12/8/18: Pt feels better, tired, On IV Abx, H/H low stable.    12/9/18: Pt seen, Examined, No Complaints, ON IV INVANZ for ESBL E COLI. Low stable H/H, S/P Lasix 20 mh x 1 dose for foot edema.    12/10/18: Patient seen and examined at bedside this morning. Patient states, "I feel great". Patient had a normal brown BM yesterday without blood. Patient scheduled for PICC line this AM for IV Abx following transfer to [facility]. Patient's finger stick accucheck 61 this AM - patient has apple juice and repeat finger stick 112. Hgb low/stable at 7.7. Patient denies headache, fever, chills, chest pain, palpitations, sob, abdominal pain, N/V, diarrhea, constipation, urinary symptoms.       ----  OVERNIGHT EVENTS: None    ----  PAST MEDICAL & SURGICAL HISTORY:  GERD with esophagitis: Gastritis &amp; Non Bleeding Ulcers  Hepatic encephalopathy  Obesity  Fatty liver disease, nonalcoholic  Renal stones: 25 years ago  Hypertension  Neuropathy  Hypercholesteremia  Diabetes  S/P cholecystectomy      FAMILY HISTORY:  Family history of type 2 diabetes mellitus (Mother)  Family history of hypertension (Father)  Family history of stomach cancer (Father)      ----  MEDICATIONS  (STANDING):  atorvastatin 10 milliGRAM(s) Oral at bedtime  dextrose 5%. 1000 milliLiter(s) (50 mL/Hr) IV Continuous <Continuous>  dextrose 50% Injectable 12.5 Gram(s) IV Push once  dextrose 50% Injectable 25 Gram(s) IV Push once  dextrose 50% Injectable 25 Gram(s) IV Push once  ertapenem  IVPB 1000 milliGRAM(s) IV Intermittent every 24 hours  gabapentin 200 milliGRAM(s) Oral four times a day  insulin glargine Injectable (LANTUS) 50 Unit(s) SubCutaneous at bedtime  insulin lispro (HumaLOG) corrective regimen sliding scale   SubCutaneous three times a day before meals  lactulose Syrup 15 Gram(s) Oral two times a day  pantoprazole    Tablet 40 milliGRAM(s) Oral two times a day  rifaximin 550 milliGRAM(s) Oral two times a day  sucralfate 1 Gram(s) Oral every 6 hours  ursodiol Capsule 300 milliGRAM(s) Oral three times a day    MEDICATIONS  (PRN):  acetaminophen   Tablet .. 650 milliGRAM(s) Oral every 6 hours PRN Temp greater or equal to 38C (100.4F)  aluminum hydroxide/magnesium hydroxide/simethicone Suspension 30 milliLiter(s) Oral every 6 hours PRN Dyspepsia  dextrose 40% Gel 15 Gram(s) Oral once PRN Blood Glucose LESS THAN 70 milliGRAM(s)/deciliter  glucagon  Injectable 1 milliGRAM(s) IntraMuscular once PRN Glucose LESS THAN 70 milligrams/deciliter  lidocaine 2% Gel 1 Application(s) Topical every 3 hours PRN pain  simethicone 80 milliGRAM(s) Chew every 6 hours PRN Gas      ----  REVIEW OF SYSTEMS:  CONSTITUTIONAL: denies fever, chills, fatigue, weakness  HEENT: denies blurred vision, sore throat  SKIN: denies new lesions, rash  CARDIOVASCULAR: denies chest pain, chest pressure, palpitations  RESPIRATORY: denies shortness of breath, sputum production  GASTROINTESTINAL: denies nausea, vomiting, diarrhea, abdominal pain  GENITOURINARY: denies dysuria, discharge  NEUROLOGICAL: denies numbness, headache, focal weakness  MUSCULOSKELETAL: denies new joint pain, muscle aches  HEMATOLOGIC: denies gross bleeding, bruising  LYMPHATICS: denies enlarged lymph nodes, b/l lower extremity swelling  PSYCHIATRIC: denies recent changes in anxiety, depression  ENDOCRINOLOGIC: denies sweating, cold or heat intolerance    ----  PHYSICAL EXAM:  GENERAL: patient appears well, no acute distress, appropriate, pleasant  EYES: sclera clear, no exudates  ENMT: oropharynx clear without erythema, no exudates, moist mucous membranes  NECK: supple, soft, no thyromegaly noted  LUNGS: good air entry bilaterally, clear to auscultation, symmetric breath sounds, no wheezing or rhonchi appreciated  HEART: soft S1/S2, regular rate and rhythm, no murmurs noted, b/l lower extremity edema  GASTROINTESTINAL: abdomen is soft, nontender, nondistended, normoactive bowel sounds, no palpable masses  INTEGUMENT: good skin turgor, no lesions noted  MUSCULOSKELETAL: no clubbing or cyanosis, no obvious deformity  NEUROLOGIC: awake, alert, oriented x3, good muscle tone in 4 extremities, no obvious sensory deficits  PSYCHIATRIC: mood is good, affect is congruent, linear and logical thought process  HEME/LYMPH: no palpable supraclavicular nodules, no obvious ecchymosis or petechiae     T(C): 36.6 (12-10-18 @ 04:00), Max: 37.1 (12-09-18 @ 23:00)  HR: 84 (12-10-18 @ 04:00) (84 - 90)  BP: 105/64 (12-10-18 @ 04:00) (105/64 - 126/70)  RR: 18 (12-10-18 @ 04:00) (18 - 18)  SpO2: 95% (12-10-18 @ 04:00) (95% - 98%)  Wt(kg): --    ----  I&O's Summary    09 Dec 2018 07:01  -  10 Dec 2018 07:00  --------------------------------------------------------  IN: 120 mL / OUT: 600 mL / NET: -480 mL        LABS:                        7.7    5.42  )-----------( 132      ( 10 Dec 2018 06:46 )             23.1     12-10    141  |  107  |  11  ----------------------------<  66<L>  3.9   |  27  |  0.92    Ca    7.7<L>      10 Dec 2018 06:46    TPro  4.8<L>  /  Alb  1.6<L>  /  TBili  1.8<H>  /  DBili  x   /  AST  32  /  ALT  23  /  AlkPhos  102  12-09        CAPILLARY BLOOD GLUCOSE      POCT Blood Glucose.: 112 mg/dL (10 Dec 2018 08:15)  POCT Blood Glucose.: 61 mg/dL (10 Dec 2018 07:33)  POCT Blood Glucose.: 171 mg/dL (09 Dec 2018 22:06)  POCT Blood Glucose.: 146 mg/dL (09 Dec 2018 17:08)  POCT Blood Glucose.: 146 mg/dL (09 Dec 2018 12:05)      12-07 @ 20:28   No growth to date.  --  --  12-07 @ 20:24   No growth to date.  --  --  12-07 @ 08:12   No growth  --  --  12-07 @ 07:29   No growth to date.  --  --            ----  Personally reviewed:  Vital sign trends: [ x ] yes    [  ] no     [  ] n/a  Laboratory results: [ x ] yes    [  ] no     [  ] n/a  Radiology results: [ x ] yes    [  ] no     [  ] n/a  Culture results: [ x ] yes    [  ] no     [  ] n/a  Consultant recommendations: [ x ] yes    [  ] no     [  ] n/a Patient is a 63y old  Male who presents with a chief complaint of vomiting and near syncope (09 Dec 2018 19:48)      FROM ADMISSION H+P:   HPI:  Pt is a 61 y/o M with PMHx of Type 2 DM, HTN, HLD, obesity, nephrolithiasis (last stone 25 years ago), neuropathy, HE, NAFLD, Hyper ammonemia, recent Gastritis/ esophagitis on EGD & Gram variable teddy bacteremia in 10/2018  who presents with vomiting and  near syncope/falling out of a moving car. Pt [place] and felt [place]off", his vision was cloudy, he pulled over the car and stopped and vomited. Vomitus was "creamy white." Pt denies seeing blood in vomitus. His dtr then took over driving and Pt again felt like he needed to vomit and told dtr to pull over. He thought the car had stopped and he opened the door and fell out of the moving car. He laid on the ground for ~ 15 minutes. Pt says he possibly lost consciousness. States when he was lying on the ground felt very cold. Pt has had "terrible heartburn" for 2 days and felt chills for 2 days. Pt denies abdominal pain, CP, palp, SOB, cough, myalgias, rash, HA. Pt admits to chills, vomiting (4 episodes), diarrhea (from lactulose), dizziness, pedal edema, tingling in hands (chronic, intermittent). Pt denies recent travel, sick contacts, different eating patterns.    Pt states that early this week pt saw her Endocrinologist & started back on Metformin 500 mg daily as per Dr's recommendation.  In ED Pt's vitals were: T(C): 36.3, HR: 91, BP: 85/59, RR: 15, SpO2: 98% on RA  Labs significant for lactate 5.6, , Hgb 10.4, total bili 3.7, alk phos 183, ammonia 72, Mg 1.4. UA - trace LE, mod blood, RBC 6-10, urine glucose 1,000.   CXR - negative chest  Abd Xray - There is mild content in the colon and no overt sense of bowel obstruction.  CT head - no intracranial hemorrhage or mass effect  CT cervical - Multilevel degenerative disc disease/cervical spondylosis, with posterior osteophyte disc complex C6-7 resulting in flattening of the cervical cord. If there is a clinical concern for spinal cord injury, recommend further evaluation with MRI if there are no clinical contraindications.  EKG - NSR, 93bpm    Pt given 2L NS bolus, Zofran pantoprazole, famotidine, Carafate , Pt seen by GI Dr Morales in ER & was admitted for further Eval. (30 Nov 2018 18:20)    INTERVAL HPI:  12/1/18: Pt seen, examined, Had total 4 bloody BM so far , Lactate improved, pt had CT A/P with IV Contrast. Drop in H/H. plan for, start Protonix drip,  2 u pRBC 1 u FFP & 5 mg Vit K x 1 as d/w Dr Franks -GI, Elevated ammonia level. Coagulopathy, Improving Lactate. Ac Blood loss Anemia.    12/2/18: Pt seen, Examined, pt was transferred to ICU on 12/1/18 for symptomatic upper GI Bleed, S/P 7  units pRBC in Total , 3 FFP, 1 platelets transfusion given, Pt had emergent EGD done that found Ac Bleeding Duodenal ulcer found. S/P Epi given. Homeostasis obtained, Pt seen By Sx Dr Hernandez. H/H Stable,  today with MISA, Coagulopathy , Leukocytosis & High Lactate level.  H/H Low stable with Ac Blood  Loss Anemia. Hematology Dr Rios called, as pt is followed by Dr Rios- H/O as out pt.    12/3/18:. Patient denies any acute complaints this morning. Hgb drop from 8.2 --> 7.7. Leukocytosis present at 15.08. Lactate level drop from 3.2 --> 2.5    12/4/19: Patient was sitting out of bed in chair, awake, alert, and oriented x3. Hb dropped to 6.6.  Patient states he is feeling better since yesterday. Patient had EGD done yesterday that showed large bleeding Duodenal ulcer s/p clips x2, epi injection, and cautery. Patient was given 3 units of packed red blood cells yesterday. Hbg increased from 7.3 --> 7.8. WBC count drop from 12.53 --> 9.47. Patient tolerating ice chips and clear liquid diet. Patient denies fever, chills, headache, weakness chest pain, palpitations, sob, N/V, diarrhea, constipation, blood in stool, urinary symptoms. Patient admits to feeling fatigued. Has not had a BM today.     12/5/18: , patient lying comfortably in bed. Hgb at 7.8. WBC 9.35, patient tolerating clear liquid diet. Patient denies fevers, chills, CP, palpitations, N/V/D. As per nurse, BM are still dark. Patient is asymptomatic at this time. On Regular diet.     12/6/18: . Patient lying comfortably in bed. Patient was febrile overnight 102F. Tylenol prn, AVOID NSAIDS (risk of rebleed). Hgb drop 7.8 --> 7.4. repeat H/H 8.6.  WBC 8.63. Creatinine up .98-->1.3. Tolerating regular carbohydrate diet, ate breakfast this morning. States that this was his second meal and has not had any associated N/V. Patient states he had a BM earlier this morning that was normal in consistency without any blood in stool. Patient is asymptomatic at this time. Patients BP is in 100/50's this AM, patient states that his pressure normal runs between "100-110". Patient denies headache, fatigue, weakness, chest pain, sob, palpitations, N/V, abdominal pain, diarrhea, constipation. PT saw aptient today and alerted patient felt light-headed. Will re-assess. ID Dr Rios called. Repeat H/H stable.    12/7/18: Patient lying comfortably in bed. Has no acute complaints this morning. Patient was afebrile overnight.  fever last night ,Blood cultures are positive for "Gram negative rods & Gram variable rods." Started on Zosyn IVPB 3.375g q8h.  Hgb at 8.1. K+ drop from 3.6 -->3.1. Repleted with 40 meq Kcl tablet q4h for 2 doses. Patient reports black BM yesterday. No BM this morning. Patient admits to fatigue. Denies headache, fever, chills, SOB, chest pain, palpitations, abdominal pain, diarrhea, constipation, blood in stool, urinary symptoms, fatigue, weakness.     12/8/18: Pt feels better, tired, On IV Abx, H/H low stable.    12/9/18: Pt seen, Examined, No Complaints, ON IV INVANZ for ESBL E COLI. Low stable H/H, S/P Lasix 20 mh x 1 dose for foot edema.    12/10/18: Patient seen and examined at bedside this morning. Patient states, "I feel great". Patient had a normal brown BM yesterday without blood. Patient scheduled for PICC line this AM for IV Abx following transfer to [facility]. Patient's finger stick accucheck 61 this AM - patient has apple juice and repeat finger stick 112. Hgb low/stable at 7.7. Will give 1 unit prbc before discharge to [facility]. Patient denies headache, fever, chills, chest pain, palpitations, sob, abdominal pain, N/V, diarrhea, constipation, urinary symptoms.       ----  OVERNIGHT EVENTS: None    ----  PAST MEDICAL & SURGICAL HISTORY:  GERD with esophagitis: Gastritis &amp; Non Bleeding Ulcers  Hepatic encephalopathy  Obesity  Fatty liver disease, nonalcoholic  Renal stones: 25 years ago  Hypertension  Neuropathy  Hypercholesteremia  Diabetes  S/P cholecystectomy      FAMILY HISTORY:  Family history of type 2 diabetes mellitus (Mother)  Family history of hypertension (Father)  Family history of stomach cancer (Father)      ----  MEDICATIONS  (STANDING):  atorvastatin 10 milliGRAM(s) Oral at bedtime  dextrose 5%. 1000 milliLiter(s) (50 mL/Hr) IV Continuous <Continuous>  dextrose 50% Injectable 12.5 Gram(s) IV Push once  dextrose 50% Injectable 25 Gram(s) IV Push once  dextrose 50% Injectable 25 Gram(s) IV Push once  ertapenem  IVPB 1000 milliGRAM(s) IV Intermittent every 24 hours  gabapentin 200 milliGRAM(s) Oral four times a day  insulin glargine Injectable (LANTUS) 50 Unit(s) SubCutaneous at bedtime  insulin lispro (HumaLOG) corrective regimen sliding scale   SubCutaneous three times a day before meals  lactulose Syrup 15 Gram(s) Oral two times a day  pantoprazole    Tablet 40 milliGRAM(s) Oral two times a day  rifaximin 550 milliGRAM(s) Oral two times a day  sucralfate 1 Gram(s) Oral every 6 hours  ursodiol Capsule 300 milliGRAM(s) Oral three times a day    MEDICATIONS  (PRN):  acetaminophen   Tablet .. 650 milliGRAM(s) Oral every 6 hours PRN Temp greater or equal to 38C (100.4F)  aluminum hydroxide/magnesium hydroxide/simethicone Suspension 30 milliLiter(s) Oral every 6 hours PRN Dyspepsia  dextrose 40% Gel 15 Gram(s) Oral once PRN Blood Glucose LESS THAN 70 milliGRAM(s)/deciliter  glucagon  Injectable 1 milliGRAM(s) IntraMuscular once PRN Glucose LESS THAN 70 milligrams/deciliter  lidocaine 2% Gel 1 Application(s) Topical every 3 hours PRN pain  simethicone 80 milliGRAM(s) Chew every 6 hours PRN Gas      ----  REVIEW OF SYSTEMS:  CONSTITUTIONAL: denies fever, chills, fatigue, weakness  HEENT: denies blurred vision, sore throat  SKIN: denies new lesions, rash  CARDIOVASCULAR: denies chest pain, chest pressure, palpitations  RESPIRATORY: denies shortness of breath, sputum production  GASTROINTESTINAL: denies nausea, vomiting, diarrhea, abdominal pain  GENITOURINARY: denies dysuria, discharge  NEUROLOGICAL: denies numbness, headache, focal weakness  MUSCULOSKELETAL: denies new joint pain, muscle aches  HEMATOLOGIC: denies gross bleeding, bruising  LYMPHATICS: denies enlarged lymph nodes, b/l lower extremity swelling  PSYCHIATRIC: denies recent changes in anxiety, depression  ENDOCRINOLOGIC: denies sweating, cold or heat intolerance    ----  PHYSICAL EXAM:  GENERAL: patient appears well, no acute distress, appropriate, pleasant  EYES: sclera clear, no exudates  ENMT: oropharynx clear without erythema, no exudates, moist mucous membranes  NECK: supple, soft, no thyromegaly noted  LUNGS: good air entry bilaterally, clear to auscultation, symmetric breath sounds, no wheezing or rhonchi appreciated  HEART: soft S1/S2, regular rate and rhythm, no murmurs noted, b/l lower extremity edema  GASTROINTESTINAL: abdomen is soft, nontender, nondistended, normoactive bowel sounds, no palpable masses  INTEGUMENT: good skin turgor, no lesions noted  MUSCULOSKELETAL: no clubbing or cyanosis, no obvious deformity  NEUROLOGIC: awake, alert, oriented x3, good muscle tone in 4 extremities, no obvious sensory deficits  PSYCHIATRIC: mood is good, affect is congruent, linear and logical thought process  HEME/LYMPH: no palpable supraclavicular nodules, no obvious ecchymosis or petechiae     T(C): 36.6 (12-10-18 @ 04:00), Max: 37.1 (12-09-18 @ 23:00)  HR: 84 (12-10-18 @ 04:00) (84 - 90)  BP: 105/64 (12-10-18 @ 04:00) (105/64 - 126/70)  RR: 18 (12-10-18 @ 04:00) (18 - 18)  SpO2: 95% (12-10-18 @ 04:00) (95% - 98%)  Wt(kg): --    ----  I&O's Summary    09 Dec 2018 07:01  -  10 Dec 2018 07:00  --------------------------------------------------------  IN: 120 mL / OUT: 600 mL / NET: -480 mL        LABS:                        7.7    5.42  )-----------( 132      ( 10 Dec 2018 06:46 )             23.1     12-10    141  |  107  |  11  ----------------------------<  66<L>  3.9   |  27  |  0.92    Ca    7.7<L>      10 Dec 2018 06:46    TPro  4.8<L>  /  Alb  1.6<L>  /  TBili  1.8<H>  /  DBili  x   /  AST  32  /  ALT  23  /  AlkPhos  102  12-09        CAPILLARY BLOOD GLUCOSE      POCT Blood Glucose.: 112 mg/dL (10 Dec 2018 08:15)  POCT Blood Glucose.: 61 mg/dL (10 Dec 2018 07:33)  POCT Blood Glucose.: 171 mg/dL (09 Dec 2018 22:06)  POCT Blood Glucose.: 146 mg/dL (09 Dec 2018 17:08)  POCT Blood Glucose.: 146 mg/dL (09 Dec 2018 12:05)      12-07 @ 20:28   No growth to date.  --  --  12-07 @ 20:24   No growth to date.  --  --  12-07 @ 08:12   No growth  --  --  12-07 @ 07:29   No growth to date.  --  --            ----  Personally reviewed:  Vital sign trends: [ x ] yes    [  ] no     [  ] n/a  Laboratory results: [ x ] yes    [  ] no     [  ] n/a  Radiology results: [ x ] yes    [  ] no     [  ] n/a  Culture results: [ x ] yes    [  ] no     [  ] n/a  Consultant recommendations: [ x ] yes    [  ] no     [  ] n/a Patient is a 63y old  Male who presents with a chief complaint of vomiting and near syncope (09 Dec 2018 19:48)      FROM ADMISSION H+P:   HPI:  Pt is a 61 y/o M with PMHx of Type 2 DM, HTN, HLD, obesity, nephrolithiasis (last stone 25 years ago), neuropathy, HE, NAFLD, Hyper ammonemia, recent Gastritis/ esophagitis on EGD & Gram variable teddy bacteremia in 10/2018  who presents with vomiting and  near syncope/falling out of a moving car. Pt states [place] and [place]", his vision was cloudy, he pulled over the car and stopped and vomited. Vomitus was "creamy white." Pt denies seeing blood in vomitus. His dtr then took over driving and Pt again felt like he needed to vomit and told dtr to pull over. He thought the car had stopped and he opened the door and fell out of the moving car. He laid on the ground for ~ 15 minutes. Pt says he possibly lost consciousness. States when he was lying on the ground felt very cold. Pt has had "terrible heartburn" for 2 days and felt chills for 2 days. Pt denies abdominal pain, CP, palp, SOB, cough, myalgias, rash, HA. Pt admits to chills, vomiting (4 episodes), diarrhea (from lactulose), dizziness, pedal edema, tingling in hands (chronic, intermittent). Pt denies recent travel, sick contacts, different eating patterns.    Pt states that early this week pt saw her Endocrinologist & started back on Metformin 500 mg daily as per Dr's recommendation.  In ED Pt's vitals were: T(C): 36.3, HR: 91, BP: 85/59, RR: 15, SpO2: 98% on RA  Labs significant for lactate 5.6, , Hgb 10.4, total bili 3.7, alk phos 183, ammonia 72, Mg 1.4. UA - trace LE, mod blood, RBC 6-10, urine glucose 1,000.   CXR - negative chest  Abd Xray - There is mild content in the colon and no overt sense of bowel obstruction.  CT head - no intracranial hemorrhage or mass effect  CT cervical - Multilevel degenerative disc disease/cervical spondylosis, with posterior osteophyte disc complex C6-7 resulting in flattening of the cervical cord. If there is a clinical concern for spinal cord injury, recommend further evaluation with MRI if there are no clinical contraindications.  EKG - NSR, 93bpm    Pt given 2L NS bolus, Zofran pantoprazole, famotidine, Carafate , Pt seen by GI Dr Morales in ER & was admitted for further Eval. (30 Nov 2018 18:20)    INTERVAL HPI:  12/1/18: Pt seen, examined, Had total 4 bloody BM so far , Lactate improved, pt had CT A/P with IV Contrast. Drop in H/H. plan for, start Protonix drip,  2 u pRBC 1 u FFP & 5 mg Vit K x 1 as d/w Dr Franks -GI, Elevated ammonia level. Coagulopathy, Improving Lactate. Ac Blood loss Anemia.    12/2/18: Pt seen, Examined, pt was transferred to ICU on 12/1/18 for symptomatic upper GI Bleed, S/P 7  units pRBC in Total , 3 FFP, 1 platelets transfusion given, Pt had emergent EGD done that found Ac Bleeding Duodenal ulcer found. S/P Epi given. Homeostasis obtained, Pt seen By Sx Dr Hernandez. H/H Stable,  today with MISA, Coagulopathy , Leukocytosis & High Lactate level.  H/H Low stable with Ac Blood  Loss Anemia. Hematology Dr Rios called, as pt is followed by Dr Rios- H/O as out pt.    12/3/18:. Patient denies any acute complaints this morning. Hgb drop from 8.2 --> 7.7. Leukocytosis present at 15.08. Lactate level drop from 3.2 --> 2.5    12/4/19: Patient was sitting out of bed in chair, awake, alert, and oriented x3. Hb dropped to 6.6.  Patient states he is feeling better since yesterday. Patient had EGD done yesterday that showed large bleeding Duodenal ulcer s/p clips x2, epi injection, and cautery. Patient was given 3 units of packed red blood cells yesterday. Hbg increased from 7.3 --> 7.8. WBC count drop from 12.53 --> 9.47. Patient tolerating ice chips and clear liquid diet. Patient denies fever, chills, headache, weakness chest pain, palpitations, sob, N/V, diarrhea, constipation, blood in stool, urinary symptoms. Patient admits to feeling fatigued. Has not had a BM today.     12/5/18: , patient lying comfortably in bed. Hgb at 7.8. WBC 9.35, patient tolerating clear liquid diet. Patient denies fevers, chills, CP, palpitations, N/V/D. As per nurse, BM are still dark. Patient is asymptomatic at this time. On Regular diet.     12/6/18: . Patient lying comfortably in bed. Patient was febrile overnight 102F. Tylenol prn, AVOID NSAIDS (risk of rebleed). Hgb drop 7.8 --> 7.4. repeat H/H 8.6.  WBC 8.63. Creatinine up .98-->1.3. Tolerating regular carbohydrate diet, ate breakfast this morning. States that this was his second meal and has not had any associated N/V. Patient states he had a BM earlier this morning that was normal in consistency without any blood in stool. Patient is asymptomatic at this time. Patients BP is in 100/50's this AM, patient states that his pressure normal runs between "100-110". Patient denies headache, fatigue, weakness, chest pain, sob, palpitations, N/V, abdominal pain, diarrhea, constipation. PT saw aptient today and alerted patient felt light-headed. Will re-assess. ID Dr Rios called. Repeat H/H stable.    12/7/18: Patient lying comfortably in bed. Has no acute complaints this morning. Patient was afebrile overnight.  fever last night ,Blood cultures are positive for "Gram negative rods & Gram variable rods." Started on Zosyn IVPB 3.375g q8h.  Hgb at 8.1. K+ drop from 3.6 -->3.1. Repleted with 40 meq Kcl tablet q4h for 2 doses. Patient reports black BM yesterday. No BM this morning. Patient admits to fatigue. Denies headache, fever, chills, SOB, chest pain, palpitations, abdominal pain, diarrhea, constipation, blood in stool, urinary symptoms, fatigue, weakness.     12/8/18: Pt feels better, tired, On IV Abx, H/H low stable.    12/9/18: Pt seen, Examined, No Complaints, ON IV INVANZ for ESBL E COLI. Low stable H/H, S/P Lasix 20 mh x 1 dose for foot edema.    12/10/18: Patient seen and examined at bedside this morning. Patient states, "I feel great". Patient had a normal brown BM yesterday without blood. Patient had PICC line placed this AM for IV Abx. Patient's finger stick accucheck 61 this AM - patient had apple juice and repeat finger stick 112. Hgb low/stable at 7.7. Changed to low dose insulin coverage scale. Will give 1 unit prbc and if BOP is stable will give 20mg IV lasix. Patient denies headache, fever, chills, chest pain, palpitations, sob, abdominal pain, N/V, diarrhea, constipation, urinary symptoms.       ----  OVERNIGHT EVENTS: None    ----  PAST MEDICAL & SURGICAL HISTORY:  GERD with esophagitis: Gastritis &amp; Non Bleeding Ulcers  Hepatic encephalopathy  Obesity  Fatty liver disease, nonalcoholic  Renal stones: 25 years ago  Hypertension  Neuropathy  Hypercholesteremia  Diabetes  S/P cholecystectomy      FAMILY HISTORY:  Family history of type 2 diabetes mellitus (Mother)  Family history of hypertension (Father)  Family history of stomach cancer (Father)      ----  MEDICATIONS  (STANDING):  atorvastatin 10 milliGRAM(s) Oral at bedtime  dextrose 5%. 1000 milliLiter(s) (50 mL/Hr) IV Continuous <Continuous>  dextrose 50% Injectable 12.5 Gram(s) IV Push once  dextrose 50% Injectable 25 Gram(s) IV Push once  dextrose 50% Injectable 25 Gram(s) IV Push once  ertapenem  IVPB 1000 milliGRAM(s) IV Intermittent every 24 hours  gabapentin 200 milliGRAM(s) Oral four times a day  insulin glargine Injectable (LANTUS) 50 Unit(s) SubCutaneous at bedtime  insulin lispro (HumaLOG) corrective regimen sliding scale   SubCutaneous three times a day before meals  lactulose Syrup 15 Gram(s) Oral two times a day  pantoprazole    Tablet 40 milliGRAM(s) Oral two times a day  rifaximin 550 milliGRAM(s) Oral two times a day  sucralfate 1 Gram(s) Oral every 6 hours  ursodiol Capsule 300 milliGRAM(s) Oral three times a day    MEDICATIONS  (PRN):  acetaminophen   Tablet .. 650 milliGRAM(s) Oral every 6 hours PRN Temp greater or equal to 38C (100.4F)  aluminum hydroxide/magnesium hydroxide/simethicone Suspension 30 milliLiter(s) Oral every 6 hours PRN Dyspepsia  dextrose 40% Gel 15 Gram(s) Oral once PRN Blood Glucose LESS THAN 70 milliGRAM(s)/deciliter  glucagon  Injectable 1 milliGRAM(s) IntraMuscular once PRN Glucose LESS THAN 70 milligrams/deciliter  lidocaine 2% Gel 1 Application(s) Topical every 3 hours PRN pain  simethicone 80 milliGRAM(s) Chew every 6 hours PRN Gas      ----  REVIEW OF SYSTEMS:  CONSTITUTIONAL: denies fever, chills, fatigue, weakness  HEENT: denies blurred vision, sore throat  SKIN: denies new lesions, rash  CARDIOVASCULAR: denies chest pain, chest pressure, palpitations  RESPIRATORY: denies shortness of breath, sputum production  GASTROINTESTINAL: denies nausea, vomiting, diarrhea, abdominal pain  GENITOURINARY: denies dysuria, discharge  NEUROLOGICAL: denies numbness, headache, focal weakness  MUSCULOSKELETAL: denies new joint pain, muscle aches  HEMATOLOGIC: denies gross bleeding, bruising  LYMPHATICS: denies enlarged lymph nodes, b/l lower extremity swelling  PSYCHIATRIC: denies recent changes in anxiety, depression  ENDOCRINOLOGIC: denies sweating, cold or heat intolerance    ----  PHYSICAL EXAM:  GENERAL: patient appears well, no acute distress, appropriate, pleasant  EYES: sclera clear, no exudates  ENMT: oropharynx clear without erythema, no exudates, moist mucous membranes  NECK: supple, soft, no thyromegaly noted  LUNGS: good air entry bilaterally, clear to auscultation, symmetric breath sounds, no wheezing or rhonchi appreciated  HEART: soft S1/S2, regular rate and rhythm, no murmurs noted, b/l lower extremity edema  GASTROINTESTINAL: abdomen is soft, nontender, nondistended, normoactive bowel sounds, no palpable masses  INTEGUMENT: good skin turgor, no lesions noted  MUSCULOSKELETAL: no clubbing or cyanosis, no obvious deformity  NEUROLOGIC: awake, alert, oriented x3, good muscle tone in 4 extremities, no obvious sensory deficits  PSYCHIATRIC: mood is good, affect is congruent, linear and logical thought process  HEME/LYMPH: no palpable supraclavicular nodules, no obvious ecchymosis or petechiae     T(C): 36.6 (12-10-18 @ 04:00), Max: 37.1 (12-09-18 @ 23:00)  HR: 84 (12-10-18 @ 04:00) (84 - 90)  BP: 105/64 (12-10-18 @ 04:00) (105/64 - 126/70)  RR: 18 (12-10-18 @ 04:00) (18 - 18)  SpO2: 95% (12-10-18 @ 04:00) (95% - 98%)  Wt(kg): --  ----  I&O's Summary    09 Dec 2018 07:01  -  10 Dec 2018 07:00  --------------------------------------------------------  IN: 120 mL / OUT: 600 mL / NET: -480 mL    LABS:                        7.7    5.42  )-----------( 132      ( 10 Dec 2018 06:46 )             23.1     12-10    141  |  107  |  11  ----------------------------<  66<L>  3.9   |  27  |  0.92    Ca    7.7<L>      10 Dec 2018 06:46    TPro  4.8<L>  /  Alb  1.6<L>  /  TBili  1.8<H>  /  DBili  x   /  AST  32  /  ALT  23  /  AlkPhos  102  12-09    CAPILLARY BLOOD GLUCOSE    POCT Blood Glucose.: 112 mg/dL (10 Dec 2018 08:15)  POCT Blood Glucose.: 61 mg/dL (10 Dec 2018 07:33)  POCT Blood Glucose.: 171 mg/dL (09 Dec 2018 22:06)  POCT Blood Glucose.: 146 mg/dL (09 Dec 2018 17:08)  POCT Blood Glucose.: 146 mg/dL (09 Dec 2018 12:05)    12-07 @ 20:28   No growth to date.  --  --  12-07 @ 20:24   No growth to date.  --  --  12-07 @ 08:12   No growth  --  --  12-07 @ 07:29   No growth to date.  --  --    ----  Personally reviewed:  Vital sign trends: [ x ] yes    [  ] no     [  ] n/a  Laboratory results: [ x ] yes    [  ] no     [  ] n/a  Radiology results: [ x ] yes    [  ] no     [  ] n/a  Culture results: [ x ] yes    [  ] no     [  ] n/a  Consultant recommendations: [ x ] yes    [  ] no     [  ] n/a Patient is a 63y old  Male who presents with a chief complaint of vomiting and near syncope (09 Dec 2018 19:48)      FROM ADMISSION H+P:   HPI:  Pt is a 63 y/o M with PMHx of Type 2 DM, HTN, HLD, obesity, nephrolithiasis (last stone 25 years ago), neuropathy, HE, NAFLD, Hyper ammonemia, recent Gastritis/ esophagitis on EGD & Gram variable teddy bacteremia in 10/2018  who presents with vomiting and  near syncope/falling out of a moving car. Pt [place] and felt "off", his vision was cloudy, he pulled over the car and stopped and vomited. Vomitus was "creamy white." Pt denies seeing blood in vomitus. His dtr then took over driving and Pt again felt like he needed to vomit and told dtr to pull over. He thought the car had stopped and he opened the door and fell out of the moving car. He laid on the ground for ~ 15 minutes. Pt says he possibly lost consciousness. States when he was lying on the ground felt very cold. Pt has had "terrible heartburn" for 2 days and felt chills for 2 days. Pt denies abdominal pain, CP, palp, SOB, cough, myalgias, rash, HA. Pt admits to chills, vomiting (4 episodes), diarrhea (from lactulose), dizziness, pedal edema, tingling in hands (chronic, intermittent). Pt denies recent travel, sick contacts, different eating patterns.    Pt states that early this week pt saw her Endocrinologist & started back on Metformin 500 mg daily as per Dr's recommendation.  In ED Pt's vitals were: T(C): 36.3, HR: 91, BP: 85/59, RR: 15, SpO2: 98% on RA  Labs significant for lactate 5.6, , Hgb 10.4, total bili 3.7, alk phos 183, ammonia 72, Mg 1.4. UA - trace LE, mod blood, RBC 6-10, urine glucose 1,000.   CXR - negative chest  Abd Xray - There is mild content in the colon and no overt sense of bowel obstruction.  CT head - no intracranial hemorrhage or mass effect  CT cervical - Multilevel degenerative disc disease/cervical spondylosis, with posterior osteophyte disc complex C6-7 resulting in flattening of the cervical cord. If there is a clinical concern for spinal cord injury, recommend further evaluation with MRI if there are no clinical contraindications.  EKG - NSR, 93bpm    Pt given 2L NS bolus, Zofran pantoprazole, famotidine, Carafate , Pt seen by GI Dr Morales in ER & was admitted for further Eval. (30 Nov 2018 18:20)    INTERVAL HPI:  12/1/18: Pt seen, examined, Had total 4 bloody BM so far , Lactate improved, pt had CT A/P with IV Contrast. Drop in H/H. plan for, start Protonix drip,  2 u pRBC 1 u FFP & 5 mg Vit K x 1 as d/w Dr Franks -GI, Elevated ammonia level. Coagulopathy, Improving Lactate. Ac Blood loss Anemia.    12/2/18: Pt seen, Examined, pt was transferred to ICU on 12/1/18 for symptomatic upper GI Bleed, S/P 7  units pRBC in Total , 3 FFP, 1 platelets transfusion given, Pt had emergent EGD done that found Ac Bleeding Duodenal ulcer found. S/P Epi given. Homeostasis obtained, Pt seen By Sx Dr Hernandez. H/H Stable,  today with MISA, Coagulopathy , Leukocytosis & High Lactate level.  H/H Low stable with Ac Blood  Loss Anemia. Hematology Dr Rios called, as pt is followed by Dr Rios- H/O as out pt.    12/3/18:. Patient denies any acute complaints this morning. Hgb drop from 8.2 --> 7.7. Leukocytosis present at 15.08. Lactate level drop from 3.2 --> 2.5    12/4/19: Patient was sitting out of bed in chair, awake, alert, and oriented x3. Hb dropped to 6.6.  Patient states he is feeling better since yesterday. Patient had EGD done yesterday that showed large bleeding Duodenal ulcer s/p clips x2, epi injection, and cautery. Patient was given 3 units of packed red blood cells yesterday. Hbg increased from 7.3 --> 7.8. WBC count drop from 12.53 --> 9.47. Patient tolerating ice chips and clear liquid diet. Patient denies fever, chills, headache, weakness chest pain, palpitations, sob, N/V, diarrhea, constipation, blood in stool, urinary symptoms. Patient admits to feeling fatigued. Has not had a BM today.     12/5/18: , patient lying comfortably in bed. Hgb at 7.8. WBC 9.35, patient tolerating clear liquid diet. Patient denies fevers, chills, CP, palpitations, N/V/D. As per nurse, BM are still dark. Patient is asymptomatic at this time. On Regular diet.     12/6/18: . Patient lying comfortably in bed. Patient was febrile overnight 102F. Tylenol prn, AVOID NSAIDS (risk of rebleed). Hgb drop 7.8 --> 7.4. repeat H/H 8.6.  WBC 8.63. Creatinine up .98-->1.3. Tolerating regular carbohydrate diet, ate breakfast this morning. States that this was his second meal and has not had any associated N/V. Patient states he had a BM earlier this morning that was normal in consistency without any blood in stool. Patient is asymptomatic at this time. Patients BP is in 100/50's this AM, patient states that his pressure normal runs between "100-110". Patient denies headache, fatigue, weakness, chest pain, sob, palpitations, N/V, abdominal pain, diarrhea, constipation. PT saw aptient today and alerted patient felt light-headed. Will re-assess. ID Dr Rios called. Repeat H/H stable.    12/7/18: Patient lying comfortably in bed. Has no acute complaints this morning. Patient was afebrile overnight.  fever last night ,Blood cultures are positive for "Gram negative rods & Gram variable rods." Started on Zosyn IVPB 3.375g q8h.  Hgb at 8.1. K+ drop from 3.6 -->3.1. Repleted with 40 meq Kcl tablet q4h for 2 doses. Patient reports black BM yesterday. No BM this morning. Patient admits to fatigue. Denies headache, fever, chills, SOB, chest pain, palpitations, abdominal pain, diarrhea, constipation, blood in stool, urinary symptoms, fatigue, weakness.     12/8/18: Pt feels better, tired, On IV Abx, H/H low stable.    12/9/18: Pt seen, Examined, No Complaints, ON IV INVANZ for ESBL E COLI. Low stable H/H, S/P Lasix 20 mh x 1 dose for foot edema.    12/10/18: Patient seen and examined at bedside this morning. Patient states, "I feel great". Patient had a normal brown BM yesterday without blood. Patient had PICC line placed this AM for IV Abx. Patient's finger stick accucheck 61 this AM - patient had apple juice and repeat finger stick 112. Hgb low/stable at 7.7. Changed to low dose insulin coverage scale. Will give 1 unit prbc and if BOP is stable will give 20mg IV lasix. Patient denies headache, fever, chills, chest pain, palpitations, sob, abdominal pain, N/V, diarrhea, constipation, urinary symptoms.       ----  OVERNIGHT EVENTS: None    ----  PAST MEDICAL & SURGICAL HISTORY:  GERD with esophagitis: Gastritis &amp; Non Bleeding Ulcers  Hepatic encephalopathy  Obesity  Fatty liver disease, nonalcoholic  Renal stones: 25 years ago  Hypertension  Neuropathy  Hypercholesteremia  Diabetes  S/P cholecystectomy      FAMILY HISTORY:  Family history of type 2 diabetes mellitus (Mother)  Family history of hypertension (Father)  Family history of stomach cancer (Father)      ----  MEDICATIONS  (STANDING):  atorvastatin 10 milliGRAM(s) Oral at bedtime  dextrose 5%. 1000 milliLiter(s) (50 mL/Hr) IV Continuous <Continuous>  dextrose 50% Injectable 12.5 Gram(s) IV Push once  dextrose 50% Injectable 25 Gram(s) IV Push once  dextrose 50% Injectable 25 Gram(s) IV Push once  ertapenem  IVPB 1000 milliGRAM(s) IV Intermittent every 24 hours  gabapentin 200 milliGRAM(s) Oral four times a day  insulin glargine Injectable (LANTUS) 50 Unit(s) SubCutaneous at bedtime  insulin lispro (HumaLOG) corrective regimen sliding scale   SubCutaneous three times a day before meals  lactulose Syrup 15 Gram(s) Oral two times a day  pantoprazole    Tablet 40 milliGRAM(s) Oral two times a day  rifaximin 550 milliGRAM(s) Oral two times a day  sucralfate 1 Gram(s) Oral every 6 hours  ursodiol Capsule 300 milliGRAM(s) Oral three times a day    MEDICATIONS  (PRN):  acetaminophen   Tablet .. 650 milliGRAM(s) Oral every 6 hours PRN Temp greater or equal to 38C (100.4F)  aluminum hydroxide/magnesium hydroxide/simethicone Suspension 30 milliLiter(s) Oral every 6 hours PRN Dyspepsia  dextrose 40% Gel 15 Gram(s) Oral once PRN Blood Glucose LESS THAN 70 milliGRAM(s)/deciliter  glucagon  Injectable 1 milliGRAM(s) IntraMuscular once PRN Glucose LESS THAN 70 milligrams/deciliter  lidocaine 2% Gel 1 Application(s) Topical every 3 hours PRN pain  simethicone 80 milliGRAM(s) Chew every 6 hours PRN Gas      ----  REVIEW OF SYSTEMS:  CONSTITUTIONAL: denies fever, chills, fatigue, weakness  HEENT: denies blurred vision, sore throat  SKIN: denies new lesions, rash  CARDIOVASCULAR: denies chest pain, chest pressure, palpitations  RESPIRATORY: denies shortness of breath, sputum production  GASTROINTESTINAL: denies nausea, vomiting, diarrhea, abdominal pain  GENITOURINARY: denies dysuria, discharge  NEUROLOGICAL: denies numbness, headache, focal weakness  MUSCULOSKELETAL: denies new joint pain, muscle aches  HEMATOLOGIC: denies gross bleeding, bruising  LYMPHATICS: denies enlarged lymph nodes, b/l lower extremity swelling  PSYCHIATRIC: denies recent changes in anxiety, depression  ENDOCRINOLOGIC: denies sweating, cold or heat intolerance    ----  PHYSICAL EXAM:  GENERAL: patient appears well, no acute distress, appropriate, pleasant  EYES: sclera clear, no exudates  ENMT: oropharynx clear without erythema, no exudates, moist mucous membranes  NECK: supple, soft, no thyromegaly noted  LUNGS: good air entry bilaterally, clear to auscultation, symmetric breath sounds, no wheezing or rhonchi appreciated  HEART: soft S1/S2, regular rate and rhythm, no murmurs noted, b/l lower extremity edema 3+  GASTROINTESTINAL: abdomen is soft, nontender, nondistended, normoactive bowel sounds, no palpable masses  INTEGUMENT: good skin turgor, no lesions noted  MUSCULOSKELETAL: no clubbing or cyanosis, no obvious deformity  NEUROLOGIC: awake, alert, oriented x3, good muscle tone in 4 extremities, no obvious sensory deficits  PSYCHIATRIC: mood is good, affect is congruent, linear and logical thought process  HEME/LYMPH: no palpable supraclavicular nodules, no obvious ecchymosis or petechiae     T(C): 36.6 (12-10-18 @ 04:00), Max: 37.1 (12-09-18 @ 23:00)  HR: 84 (12-10-18 @ 04:00) (84 - 90)  BP: 105/64 (12-10-18 @ 04:00) (105/64 - 126/70)  RR: 18 (12-10-18 @ 04:00) (18 - 18)  SpO2: 95% (12-10-18 @ 04:00) (95% - 98%)  Wt(kg): --  ----  I&O's Summary    09 Dec 2018 07:01  -  10 Dec 2018 07:00  --------------------------------------------------------  IN: 120 mL / OUT: 600 mL / NET: -480 mL    LABS:                        7.7    5.42  )-----------( 132      ( 10 Dec 2018 06:46 )             23.1     12-10    141  |  107  |  11  ----------------------------<  66<L>  3.9   |  27  |  0.92    Ca    7.7<L>      10 Dec 2018 06:46    TPro  4.8<L>  /  Alb  1.6<L>  /  TBili  1.8<H>  /  DBili  x   /  AST  32  /  ALT  23  /  AlkPhos  102  12-09    CAPILLARY BLOOD GLUCOSE    POCT Blood Glucose.: 112 mg/dL (10 Dec 2018 08:15)  POCT Blood Glucose.: 61 mg/dL (10 Dec 2018 07:33)  POCT Blood Glucose.: 171 mg/dL (09 Dec 2018 22:06)  POCT Blood Glucose.: 146 mg/dL (09 Dec 2018 17:08)  POCT Blood Glucose.: 146 mg/dL (09 Dec 2018 12:05)    12-07 @ 20:28   No growth to date.  --  --  12-07 @ 20:24   No growth to date.  --  --  12-07 @ 08:12   No growth  --  --  12-07 @ 07:29   No growth to date.  --  --    ----  Personally reviewed:  Vital sign trends: [ x ] yes    [  ] no     [  ] n/a  Laboratory results: [ x ] yes    [  ] no     [  ] n/a  Radiology results: [ x ] yes    [  ] no     [  ] n/a  Culture results: [ x ] yes    [  ] no     [  ] n/a  Consultant recommendations: [ x ] yes    [  ] no     [  ] n/a Patient is a 63y old  Male who presents with a chief complaint of vomiting and near syncope (10 Dec 2018 16:50)      INTERVAL HPI:  Pt is a 61 y/o M with PMHx of Type 2 DM, HTN, HLD, obesity, nephrolithiasis (last stone 25 years ago), neuropathy, HE, NAFLD, Hyper ammonemia, recent Gastritis/ esophagitis on EGD & Gram variable teddy bacteremia in 10/2018  who presents with vomiting and  near syncope/falling out of a moving car. Pt states [place] and felt [place]", his vision was cloudy, he pulled over the car and stopped and vomited. Vomitus was "creamy white." Pt denies seeing blood in vomitus. His dtr then took over driving and Pt again felt like he needed to vomit and told dtr to pull over. He thought the car had stopped and he opened the door and fell out of the moving car. He laid on the ground for ~ 15 minutes. Pt says he possibly lost consciousness. States when he was lying on the ground felt very cold. Pt has had "terrible heartburn" for 2 days and felt chills for 2 days. Pt denies abdominal pain, CP, palp, SOB, cough, myalgias, rash, HA. Pt admits to chills, vomiting (4 episodes), diarrhea (from lactulose), dizziness, pedal edema, tingling in hands (chronic, intermittent). Pt denies recent travel, sick contacts, different eating patterns.   Pt states that early this week pt saw her Endocrinologist & started back on Metformin 500 mg daily as per Dr's recommendation.  In ED Pt's vitals were: T(C): 36.3, HR: 91, BP: 85/59, RR: 15, SpO2: 98% on RA  Labs significant for lactate 5.6, , Hgb 10.4, total bili 3.7, alk phos 183, ammonia 72, Mg 1.4. UA - trace LE, mod blood, RBC 6-10, urine glucose 1,000.   CXR - negative chest  Abd Xray - There is mild content in the colon and no overt sense of bowel obstruction.  CT head - no intracranial hemorrhage or mass effect  CT cervical - Multilevel degenerative disc disease/cervical spondylosis, with posterior osteophyte disc complex C6-7 resulting in flattening of the cervical cord. If there is a clinical concern for spinal cord injury, recommend further evaluation with MRI if there are no clinical contraindications.  EKG - NSR, 93bpm  Pt given 2L NS bolus, Zofran pantoprazole, famotidine, Carafate , Pt seen by GI Dr Morales in ER & was admitted for further Eval.    12/1/18: Pt seen, examined, Had total 4 bloody BM so far , Lactate improved, pt had CT A/P with IV Contrast. Drop in H/H. plan for, start Protonix drip,  2 u pRBC 1 u FFP & 5 mg Vit K x 1 as d/w Dr Franks -GI, Elevated ammonia level. Coagulopathy, Improving Lactate. Ac Blood loss Anemia.    12/2/18: Pt seen, Examined, pt was transferred to ICU on 12/1/18 for symptomatic upper GI Bleed, S/P 7  units pRBC in Total , 3 FFP, 1 platelets transfusion given, Pt had emergent EGD done that found Ac Bleeding Duodenal ulcer found. S/P Epi given. Homeostasis obtained, Pt seen By Sx Dr Hernandez. H/H Stable,  today with MISA, Coagulopathy , Leukocytosis & High Lactate level.  H/H Low stable with Ac Blood  Loss Anemia. Hematology Dr Rios called, as pt is followed by Dr Rios- H/O as out pt.    12/3/18:. Patient denies any acute complaints this morning. Hgb drop from 8.2 --> 7.7. Leukocytosis present at 15.08. Lactate level drop from 3.2 --> 2.5    12/4/19: Patient was sitting out of bed in chair, awake, alert, and oriented x3. Hb dropped to 6.6.  Patient states he is feeling better since yesterday. Patient had EGD done yesterday that showed large bleeding Duodenal ulcer s/p clips x2, epi injection, and cautery. Patient was given 3 units of packed red blood cells yesterday. Hbg increased from 7.3 --> 7.8. WBC count drop from 12.53 --> 9.47. Patient tolerating ice chips and clear liquid diet. Patient denies fever, chills, headache, weakness chest pain, palpitations, sob, N/V, diarrhea, constipation, blood in stool, urinary symptoms. Patient admits to feeling fatigued. Has not had a BM today.     12/5/18: , patient lying comfortably in bed. Hgb at 7.8. WBC 9.35, patient tolerating clear liquid diet. Patient denies fevers, chills, CP, palpitations, N/V/D. As per nurse, BM are still dark. Patient is asymptomatic at this time. On Regular diet.     12/6/18: . Patient lying comfortably in bed. Patient was febrile overnight 102F. Tylenol prn, AVOID NSAIDS (risk of rebleed). Hgb drop 7.8 --> 7.4. repeat H/H 8.6.  WBC 8.63. Creatinine up .98-->1.3. Tolerating regular carbohydrate diet, ate breakfast this morning. States that this was his second meal and has not had any associated N/V. Patient states he had a BM earlier this morning that was normal in consistency without any blood in stool. Patient is asymptomatic at this time. Patients BP is in 100/50's this AM, patient states that his pressure normal runs between "100-110". Patient denies headache, fatigue, weakness, chest pain, sob, palpitations, N/V, abdominal pain, diarrhea, constipation. PT saw aptient today and alerted patient felt light-headed. Will re-assess. ID Dr Rios called. Repeat H/H stable.    12/7/18: Patient lying comfortably in bed. Has no acute complaints this morning. Patient was afebrile overnight.  fever last night ,Blood cultures are positive for "Gram negative rods & Gram variable rods." Started on Zosyn IVPB 3.375g q8h.  Hgb at 8.1. K+ drop from 3.6 -->3.1. Repleted with 40 meq Kcl tablet q4h for 2 doses. Patient reports black BM yesterday. No BM this morning. Patient admits to fatigue. Denies headache, fever, chills, SOB, chest pain, palpitations, abdominal pain, diarrhea, constipation, blood in stool, urinary symptoms.   12/8/18: Pt feels better, tired, On IV Abx, H/H low stable.  12/9/18: Pt seen, Examined, No Complaints, ON IV INVANZ for ESBL E COLI. Low stable H/H, S/P Lasix 20 mh x 1 dose for foot edema.    12/10/18: Patient seen and examined at bedside this morning. Patient states, "I feel great". Patient had a normal brown BM yesterday without blood. Patient had PICC line placed this AM for IV Abx. Patient's finger stick accucheck 61 this AM - patient had apple juice and repeat finger stick 112. Hgb low/stable at 7.7. Changed to low dose insulin coverage scale. Will give 1 unit pRBC and if BP is stable will give 20mg IV Lasix post transfusion, Patient denies headache, fever, chills, chest pain, palpitations, sob, abdominal pain, N/V, diarrhea, constipation, urinary symptoms. MID line placed in IR PT reeval done ----> HCPT      OVERNIGHT EVENTS:NONE    Home Medications:  aluminum hydroxide-magnesium hydroxide 200 mg-200 mg/5 mL oral suspension: 30 milliliter(s) orally every 6 hours, As needed, Dyspepsia (10 Dec 2018 16:01)  Bacid (LAC) oral tablet: 1 tab(s) orally 2 times a day for  2weeks (10 Dec 2018 15:36)  ertapenem 1 g injection:  injectable stop date December 18th. (10 Dec 2018 16:01)  lactulose 10 g/15 mL oral syrup: 15 milliliter(s) orally 3 times a day (10 Dec 2018 15:36)  phytonadione 100 mcg oral tablet: 1 tab(s) orally once a day (10 Dec 2018 15:36)  potassium chloride 10 mEq oral tablet, extended release: 1 tab(s) orally once a day (10 Dec 2018 15:36)  pravastatin 40 mg oral tablet: 1 tab(s) orally once a day (10 Dec 2018 15:36)  rifAXIMin 550 mg oral tablet: 1 tab(s) orally 2 times a day (10 Dec 2018 16:01)  Vitamin D3 2000 intl units oral tablet: 1 tab(s) orally once a day (10 Dec 2018 15:36)      MEDICATIONS  (STANDING):  atorvastatin 10 milliGRAM(s) Oral at bedtime  dextrose 5%. 1000 milliLiter(s) (50 mL/Hr) IV Continuous <Continuous>  dextrose 50% Injectable 12.5 Gram(s) IV Push once  dextrose 50% Injectable 25 Gram(s) IV Push once  dextrose 50% Injectable 25 Gram(s) IV Push once  ertapenem  IVPB 1000 milliGRAM(s) IV Intermittent every 24 hours  gabapentin 200 milliGRAM(s) Oral four times a day  insulin glargine Injectable (LANTUS) 50 Unit(s) SubCutaneous at bedtime  insulin lispro (HumaLOG) corrective regimen sliding scale   SubCutaneous three times a day before meals  lactulose Syrup 15 Gram(s) Oral two times a day  pantoprazole    Tablet 40 milliGRAM(s) Oral two times a day  rifaximin 550 milliGRAM(s) Oral two times a day  sucralfate 1 Gram(s) Oral every 6 hours  ursodiol Capsule 300 milliGRAM(s) Oral three times a day    MEDICATIONS  (PRN):  acetaminophen   Tablet .. 650 milliGRAM(s) Oral every 6 hours PRN Temp greater or equal to 38C (100.4F)  aluminum hydroxide/magnesium hydroxide/simethicone Suspension 30 milliLiter(s) Oral every 6 hours PRN Dyspepsia  dextrose 40% Gel 15 Gram(s) Oral once PRN Blood Glucose LESS THAN 70 milliGRAM(s)/deciliter  glucagon  Injectable 1 milliGRAM(s) IntraMuscular once PRN Glucose LESS THAN 70 milligrams/deciliter  lidocaine 2% Gel 1 Application(s) Topical every 3 hours PRN pain  simethicone 80 milliGRAM(s) Chew every 6 hours PRN Gas      Allergies    codeine (Anaphylaxis)    Intolerances        REVIEW OF SYSTEMS:wants to go home  CONSTITUTIONAL: No fever, No chills, No fatigue, No myalgia, No Body ache, No Weakness  EYES: No eye pain,  No visual disturbances, No discharge, NO Redness  ENMT:  No ear pain, No nose bleed, No vertigo; No sinus or throat pain, No Congestion  NECK: No pain, No stiffness  RESPIRATORY: No cough, wheezing, No  hemoptysis, No shortness of breath  CARDIOVASCULAR: No chest pain, palpitations  GASTROINTESTINAL: No abdominal or epigastric pain. No nausea, No vomiting; No diarrhea or constipation. [  x] BM  GENITOURINARY: No dysuria, No frequency, No urgency, No hematuria, or incontinence  NEUROLOGICAL: No headaches, No dizziness, No numbness, No tingling, No tremors, No weakness  EXT: No Swelling, No Pain, No Edema  SKIN:  [ x ] No itching, burning, rashes, or lesions   MUSCULOSKELETAL: No joint pain or swelling; No muscle pain, No back pain, No extremity pain  PSYCHIATRIC: No depression, anxiety, mood swings or difficulty sleeping at night  PAIN SCALE: [ x] None  [  ] Other-  ROS Unable to obtain due to - [  ] Dementia  [  ] Lethargy  [  ] Sedated [  ] non verbal  REST OF REVIEW Of SYSTEM - [ x ] Normal     Vital Signs Last 24 Hrs  T(C): 36.4 (10 Dec 2018 15:36), Max: 37.1 (09 Dec 2018 23:00)  T(F): 97.6 (10 Dec 2018 15:36), Max: 98.8 (09 Dec 2018 23:00)  HR: 82 (10 Dec 2018 15:36) (81 - 90)  BP: 110/66 (10 Dec 2018 17:58) (105/64 - 123/69)  BP(mean): --  RR: 17 (10 Dec 2018 15:36) (17 - 18)  SpO2: 100% (10 Dec 2018 15:36) (95% - 100%)  Finger Stick        12-09 @ 07:01  -  12-10 @ 07:00  --------------------------------------------------------  IN: 120 mL / OUT: 600 mL / NET: -480 mL    PHYSICAL EXAM:  GENERAL:  [ x ] NAD , [ x ] well appearing, [  ] Agitated, [  ] Drowsy,  [  ] Lethargy, [  ] confused   HEAD:  [ x ] Normal, [  ] Other  EYES:  [ x] EOMI, [x ] PERRLA, [ x ] conjunctiva and sclera clear normal, [  ] Other,  [  ] Pallor,[  ] Discharge  ENMT:  [x ] Normal, [x  ] Moist mucous membranes, [ x ] Good dentition, [ x ] No Thrush  NECK:  [ x ] Supple, [ x ] No JVD, [ x ] Normal thyroid, [  ] Lymphadenopathy [  ] Other  CHEST/LUNG:  [x  ] Clear to auscultation bilaterally, [ x ] Breath Sounds equal B/L  [ x ] No rales, [ x ] No rhonchi  [ x ]  No wheezing  HEART:  [x  ] Regular rate and rhythm, [  ] tachycardia, [  ] Bradycardia,  [  ] irregular  [x  ] No murmurs, No rubs, No gallops, [  ] PPM in place (Mfr:  )  ABDOMEN:  [ x] Soft, [ x] Nontender, [x  ] Nondistended, [ x ] No mass, [x  ] Bowel sounds present, [ x] Obese  NERVOUS SYSTEM:  [ x ] Alert & Oriented X3, [ x ] Nonfocal  [  ] Confusion  [  ] Encephalopathic [  ] Sedated [  ] Unable to assess, [  ] Other-  EXTREMITIES: [x] 2+ Peripheral Pulses, No clubbing, No cyanosis,  [x  ] 2+ edema B/L lower EXT. [  ] PVD stasis skin changes B/L Lower EXT  LYMPH: No lymphadenopathy noted  SKIN:  [ x ] No rashes or lesions, [  ] Pressure Ulcers, [  ] ecchymosis, [  ] Skin Tears, [  ] Other    DIET: DM Low Protein    LABS:                        7.7    5.42  )-----------( 132      ( 10 Dec 2018 06:46 )             23.1     10 Dec 2018 06:46    141    |  107    |  11     ----------------------------<  66     3.9     |  27     |  0.92     Ca    7.7        10 Dec 2018 06:46    Culture Results:   No growth to date. (12-07 @ 20:28)  Culture Results:   No growth to date. (12-07 @ 20:24)  Culture Results:   No growth (12-07 @ 08:12)  Culture Results:   No growth to date. (12-07 @ 07:29)  Culture Results:   Growth in aerobic and anaerobic bottles: Escherichia coli ESBL (12-06 @ 07:42)  Culture Results:   Growth in anaerobic bottle: Escherichia coli ESBL  See previous culture [id number]  "Due to technical problems, Proteus sp. will Not be reported as part of  the BCID panel until further notice"  ***Blood Panel PCR results on this specimen are available  approximately 3 hours after the Gram stain result.***  Gram stain, PCR, and/or culture results may not always  correspond due to difference in methodologies.  ************************************************************  This PCR assay was performed using [employer].  The following targets are tested for: Enterococcus,  vancomycin resistant enterococci, Listeria monocytogenes,  coagulase negative staphylococci, S. aureus,  methicillin resistant S. aureus, Streptococcus agalactiae  (Group B), S. pneumoniae, S. pyogenes (Group A),  Acinetobacter baumannii, Enterobacter cloacae, E. coli,  Klebsiella oxytoca, K. pneumoniae, Proteus sp.,  Serratia marcescens, Haemophilus influenzae,  Neisseria meningitidis, Pseudomonas aeruginosa,Candida  albicans, C. glabrata, C krusei, C parapsilosis,  C. tropicalis and the KPC resistance gene. (12-06 @ 07:42)    Culture - Blood (collected 07 Dec 2018 20:28)  Source: .Blood Blood-Venous  Preliminary Report (08 Dec 2018 21:01):    No growth to date.    Culture - Blood (collected 07 Dec 2018 20:24)  Source: .Blood Blood-Peripheral  Preliminary Report (08 Dec 2018 21:01):    No growth to date.    Culture - Urine (collected 07 Dec 2018 08:12)  Source: .Urine Clean Catch (Midstream)  Final Report (08 Dec 2018 15:37):    No growth    Culture - Blood (collected 07 Dec 2018 07:29)  Source: .Blood Blood  Preliminary Report (08 Dec 2018 08:01):    No growth to date.    Culture - Blood (collected 06 Dec 2018 07:42)  Source: .Blood Blood-Peripheral  Gram Stain (06 Dec 2018 19:53):    Growth in anaerobic bottle: Gram Variable Rods  Final Report (08 Dec 2018 16:12):    Growth in anaerobic bottle: Escherichia coli ESBL    See previous culture [id number]    "Due to technical problems, Proteus sp. will Not be reported as part of    the BCID panel until further notice"    ***Blood Panel PCR results on this specimen are available    approximately 3 hours after the Gram stain result.***    Gram stain, PCR, and/or culture results may not always    correspond due to difference in methodologies.    ************************************************************    This PCR assay was performed using [employer] [facility].    The following targets are tested for: Enterococcus,    vancomycin resistant enterococci, Listeria monocytogenes,    coagulase negative staphylococci, S. aureus,    methicillin resistant S. aureus, Streptococcus agalactiae    (Group B), S. pneumoniae, S. pyogenes (Group A),    Acinetobacter baumannii, Enterobacter cloacae, E. coli,    Klebsiella oxytoca, K. pneumoniae, Proteus sp.,    Serratia marcescens, Haemophilus influenzae,    Neisseria meningitidis, Pseudomonas aeruginosa,Candida    albicans, C. glabrata, C krusei, C parapsilosis,    C. tropicalis and the KPC resistance gene.  Organism: Blood Culture PCR (08 Dec 2018 16:12)  Organism: Blood Culture PCR (08 Dec 2018 16:12)    Culture - Blood (collected 06 Dec 2018 07:42)  Source: .Blood Blood-Peripheral  Gram Stain (06 Dec 2018 20:52):    Growth in anaerobic bottle: Gram Negative Rods    Growth in aerobic bottle: Gram Negative Rods  Final Report (08 Dec 2018 16:11):    Growth in aerobic and anaerobic bottles: Escherichia coli ESBL  Organism: Escherichia coli ESBL (08 Dec 2018 16:11)  Organism: Escherichia coli ESBL (08 Dec 2018 16:11)      RADIOLOGY & ADDITIONAL TESTS:NONE      HEALTH ISSUES - PROBLEM Dx:  Edema extremities: Edema extremities  E coli bacteremia: E coli bacteremia  Fever: Fever  Anemia: Anemia  MISA (acute kidney injury): MISA (acute kidney injury)  GIB (gastrointestinal bleeding): GIB (gastrointestinal bleeding)  Lactic acid blood increased: Lactic acid blood increased  Prophylactic measure: Prophylactic measure  Pedal edema: Pedal edema  Neuropathy: Neuropathy  Hypercholesteremia: Hypercholesteremia  Hypertension: Hypertension  Fatty liver disease, nonalcoholic: Fatty liver disease, nonalcoholic  Hepatic encephalopathy: Hepatic encephalopathy  Diabetes: Diabetes  Near syncope: Near syncope  Vomiting: Vomiting          Consultant(s) Notes Reviewed:  [x  ] YES     Care Discussed with [X] Consultants  [ x ] Patient  [  ] Family  [ x ]   [  ] Social Service  [x  ] RN, [ x ] Physical Therapy  DVT PPX: [  ] Lovenox, [  ] S C Heparin, [  ] Coumadin, [  ] Xarelto, [  ] Eliquis, [  ] Pradaxa, [  ] IV Heparin drip, [ x ] SCD [  ] Contraindication 2 to GI Bleed,[  ] Ambulation  Advanced directive: [ x ] None, [  ] DNR/DNI

## 2018-12-10 NOTE — PROGRESS NOTE ADULT - PROBLEM SELECTOR PLAN 3
-- hx of PUD, Esophagitis, Gastritis, non bleeding ulcers on last EGD10/18  -S/P  Bleeding Duodenal ulcer on Emergent 2 nd EGD on 12/3/ in ICU s/p clips x2, epi injection, and cautery. Pt had EGD done on last Weekend  - pt had multiple bloody BM 2/2 rapid upper GI bleed on admission-  - s/p 3 units FFP, 7 units pRBC, 1 platelets & Vit K 5 mg x 1 dose given 12/3  - GI ([facility]) following  - Dr David dorsey: -  If further bleeding ---> plan to transfer to [facility] for IR / surgical intervention  - c/w , Protonix 40 mg 2x day Carafate q 6 hrs   - Diet advanced to Regular carbohydrate diet w/ evening snack  - Daily CBC -- hx of PUD, Esophagitis, Gastritis, non bleeding ulcers on last EGD10/18  -S/P  Bleeding Duodenal ulcer on Emergent 2 nd EGD on 12/3/ in ICU s/p clips x2, epi injection, and cautery. Pt had EGD done on last Weekend  - pt had multiple bloody BM 2/2 rapid upper GI bleed on admission-  - s/p 3 units FFP, 7 units pRBC, 1 platelets & Vit K 5 mg x 1 dose given 12/3  - GI ([facility]) following  - Dr David dorsey: -  If further bleeding ---> plan to transfer to [facility] for IR / surgical intervention  - c/w , Protonix 40 mg 2x day Carafate q 6 hrs   - Diet advanced to Regular carbohydrate diet w/ evening snack - tolerating well  - Daily CBC -- hx of PUD, Esophagitis, Gastritis, non bleeding ulcers on last EGD10/18  -S/P  Bleeding Duodenal ulcer on Emergent 2 nd EGD on 12/3/ in ICU s/p clips x2, epi injection, and cautery. Pt had EGD done last weekend  - pt had multiple bloody BM 2/2 rapid upper GI bleed on admission-  - s/p 3 units FFP, 7 units pRBC, 1 platelets & Vit K 5 mg x 1 dose given 12/3  - GI ([facility]) following  - Dr Hernandez recs: -  If further bleeding ---> plan to transfer to [facility] for IR / surgical intervention  - c/w , Protonix 40 mg 2x day Carafate q 6 hrs   - Diet advanced to Regular carbohydrate diet w/ evening snack - tolerating well  - Daily CBC ESBL E Coli Bacteremia -Likely GI Source from ulcer   Repeat Blood c/s x2 -NEG  On IV Invanz IV Daily as per ID  PICC line Upper GI bleed- hx of PUD, Esophagitis, Gastritis, non bleeding ulcers on last EGD10/18  -S/P  Bleeding Duodenal ulcer on Emergent 2 nd EGD on 12/3/ in ICU s/p clips x2, epi injection, and cautery. Pt had EGD done last weekend  - pt had multiple bloody BM 2/2 rapid upper GI bleed on admission-  - s/p 3 units FFP, 7 units pRBC, 1 platelets & Vit K 5 mg x 1 dose given 12/3  - GI (-Dr Morales  - Dr Hernandez recs: -  If further bleeding ---> plan to transfer to [facility] for IR / surgical intervention  - c/w , Protonix 40 mg 2x day Carafate q 6 hrs   - Diet advanced to Regular carbohydrate diet w/ evening snack - tolerating well  - Daily CBC

## 2018-12-10 NOTE — PROGRESS NOTE ADULT - PROBLEM SELECTOR PLAN 5
-- Coagulopathy 2/2 Liver disease/ Cirrhosis  Abnormal LFT's , Bili & PT/PTT/INR stable  - Dr Rios -Hem Follow up as out pt  - pt follows GI (Dr. Morales)   -cont ppi, sandra, rifaximin bid  -Lasix 20 Mg IVP x 1 dose today -- Coagulopathy 2/2 Liver disease/ Cirrhosis  Abnormal LFT's , Bili & PT/PTT/INR stable  - Dr Rios -Hem Follow up as out pt  - pt follows GI (Dr. Morales)   -cont ppi, sandra, rifaximin bid  -Lasix 20 Mg IVP x 1 dose yesterday for b/l LE edema Anemia & Thrombocytopenia- Acute Blood loss Anemia with Bleeding DU - H/H Low stable. Hgb Low stable at 7.7 - will be given 1 unit prbc  Total Blood Given - 10 units pRBC, 4 units FFP, 2 units platelets (not including 12/10)  S/P EGD x 2 by GI Dr Morales   - As per Dr. Hernandez recs - consider transfusing to Hgb of 8

## 2018-12-10 NOTE — PROGRESS NOTE ADULT - PROBLEM SELECTOR PLAN 1
ugib, resolved  sp egd- large du with clot, sp epi w hemostasis  sp repeat egd, 4 clips placed, injected w epi and cauterized   no s/s active gib  serial cbc, transfuse prn  hold ac asa nsaids  cont ppi and carafate  surgery following  diet as tolerated  monitor abd exam  if rebleeds may need surgery/IR  will need close outpatient gi f/u upon dc  dc planning per primary team

## 2018-12-10 NOTE — PROGRESS NOTE ADULT - ATTENDING COMMENTS
Pt seen, Examined, case & care plan d/w pt, residents at detail.  Pt wants to go home with IV Abx, MID line placed by IR, D/W PT, ID MD  Am labs

## 2018-12-10 NOTE — PROGRESS NOTE ADULT - SUBJECTIVE AND OBJECTIVE BOX
Neurology follow up note    JESSICA [id number]      Interval History:    Patient feels ok no new complaints.    MEDICATIONS    acetaminophen   Tablet .. 650 milliGRAM(s) Oral every 6 hours PRN  acetaminophen   Tablet .. 650 milliGRAM(s) Oral once PRN  aluminum hydroxide/magnesium hydroxide/simethicone Suspension 30 milliLiter(s) Oral every 6 hours PRN  atorvastatin 10 milliGRAM(s) Oral at bedtime  dextrose 40% Gel 15 Gram(s) Oral once PRN  dextrose 5%. 1000 milliLiter(s) IV Continuous <Continuous>  dextrose 50% Injectable 12.5 Gram(s) IV Push once  dextrose 50% Injectable 25 Gram(s) IV Push once  dextrose 50% Injectable 25 Gram(s) IV Push once  diphenhydrAMINE 25 milliGRAM(s) Oral once PRN  ertapenem  IVPB 1000 milliGRAM(s) IV Intermittent every 24 hours  gabapentin 200 milliGRAM(s) Oral four times a day  glucagon  Injectable 1 milliGRAM(s) IntraMuscular once PRN  insulin glargine Injectable (LANTUS) 50 Unit(s) SubCutaneous at bedtime  insulin lispro (HumaLOG) corrective regimen sliding scale   SubCutaneous three times a day before meals  lactulose Syrup 15 Gram(s) Oral two times a day  lidocaine 2% Gel 1 Application(s) Topical every 3 hours PRN  pantoprazole    Tablet 40 milliGRAM(s) Oral two times a day  rifaximin 550 milliGRAM(s) Oral two times a day  simethicone 80 milliGRAM(s) Chew every 6 hours PRN  sucralfate 1 Gram(s) Oral every 6 hours  ursodiol Capsule 300 milliGRAM(s) Oral three times a day      Allergies    codeine (Anaphylaxis)    Intolerances            Vital Signs Last 24 Hrs  T(C): 36.3 (10 Dec 2018 08:00), Max: 37.1 (09 Dec 2018 23:00)  T(F): 97.4 (10 Dec 2018 08:00), Max: 98.8 (09 Dec 2018 23:00)  HR: 87 (10 Dec 2018 08:00) (84 - 90)  BP: 118/65 (10 Dec 2018 08:00) (105/64 - 126/70)  BP(mean): --  RR: 18 (10 Dec 2018 08:00) (18 - 18)  SpO2: 99% (10 Dec 2018 08:00) (95% - 99%)    REVIEW OF SYSTEMS:  Constitutional: No fever, chills, fatigue, generalized weakness  Eyes: no eye pain, visual disturbances, or discharge  ENT:  No difficulty hearing, tinnitus, vertigo; No sinus or throat pain  Neck: No pain or stiffness  Respiratory: No cough, dyspnea, wheezing   Cardiovascular: No chest pain, palpitations,   Gastrointestinal: No abdominal or epigastric pain. No nausea, vomiting  No diarrhea or constipation.   Genitourinary: No dysuria, frequency, hematuria or incontinence  Neurological: No headaches, OCC  lightheadedness, no vertigo, numbness or tremors  Psychiatric: No depression, anxiety, mood swings or difficulty sleeping  Musculoskeletal: No joint pain or swelling; No muscle, back or extremity pain  Skin: No itching, burning, rashes or lesions   Lymph Nodes: No enlarged glands  Endocrine: No heat or cold intolerance; No hair loss   Allergy and Immunologic: No hives or eczema    On Neurological Examination:    The patient is awake, alert, oriented x3.    Extraocular movements were intact.    Pupils equal, round and reactive bilaterally, 3 mm to 2.    Speech was fluent.  Smile symmetric.    Motor:  Bilateral upper and lower 4+/5.    Sensory:  Bilateral upper and lower intact to light touch.  No drift.  Finger-to-nose within normal limits.    Follow simple commands      GENERAL Exam: Nontoxic , No Acute Distress   	  HEENT:  normocephalic, atraumatic  		  LUNGS: Clear bilaterally    	  HEART: Normal S1S2   No murmur RRR        	  GI/ ABDOMEN:  Soft  Non tender    EXTREMITIES:   No Edema  No Clubbing  No Cyanosis    	   SKIN: Normal  No Ecchymosis                  LABS:  CBC Full  -  ( 10 Dec 2018 06:46 )  WBC Count : 5.42 K/uL  Hemoglobin : 7.7 g/dL  Hematocrit : 23.1 %  Platelet Count - Automated : 132 K/uL  Mean Cell Volume : 93.5 fl  Mean Cell Hemoglobin : 31.2 pg  Mean Cell Hemoglobin Concentration : 33.3 gm/dL  Auto Neutrophil # : x  Auto Lymphocyte # : x  Auto Monocyte # : x  Auto Eosinophil # : x  Auto Basophil # : x  Auto Neutrophil % : x  Auto Lymphocyte % : x  Auto Monocyte % : x  Auto Eosinophil % : x  Auto Basophil % : x      12-10    141  |  107  |  11  ----------------------------<  66<L>  3.9   |  27  |  0.92    Ca    7.7<L>      10 Dec 2018 06:46    TPro  4.8<L>  /  Alb  1.6<L>  /  TBili  1.8<H>  /  DBili  x   /  AST  32  /  ALT  23  /  AlkPhos  102  12-09    Hemoglobin A1C:     LIVER FUNCTIONS - ( 09 Dec 2018 06:09 )  Alb: 1.6 g/dL / Pro: 4.8 g/dL / ALK PHOS: 102 U/L / ALT: 23 U/L / AST: 32 U/L / GGT: x           Vitamin B12         RADIOLOGY        ANALYSIS AND PLAN:  A 62-year-old with episode of loss of consciousness.  1.	For episode of loss of consciousness, suspect most likely a syncopal event with a prodrome of feeling lightheaded, blurry vision, warm and sweaty, nausea and vomiting, as per spouse looked pale and becoming in the emergency room hypotensive points towards cerebral hypoperfusion.  There is no clear sign on examination to suggest a new cerebrovascular accident has ensued and there is no clear history to suggest underlying epilepsy.  2.	I would recommend telemetry evaluation, rule out underlying arrhythmia.  3.	Monitor systolic blood pressure.  4.	For history of diabetes, would recommend strict control of the patient's blood sugars.  5.	For history of neuropathy, continue the patient on his gabapentin.  6.	For episode of spasms, no new events   7.	Spoke with spouse, Ada,  Her telephone number is [phone]/2/18 no response  today 12/7/18  8.	monitor H/H   9.	GI work up as needed S/P GI bleed  10.	no new events   11.	day by day doing better   12.	monitor oral intake   13.	Mid line placement   14.	physical therapy  if possible and as tolerated       Greater than 40 minutes spent in direct patient care reviewing  the notes, lab data/ imaging , discussion with multidisciplinary team.
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
## 2018-12-10 NOTE — PROGRESS NOTE ADULT - PROBLEM SELECTOR PLAN 7
2/2 Cirrhosis -ammonia level improved ? 2/2 GI Bleed ; Pt not confused at baseline MS, no asterixis  - Ammonia level stable  on lactulose 2x day    rifaximin 550 BID -A1C -improved, still elevated  -Hypoglycemia Protocol, Low Dose Insulin Sliding Coverage, Lantus 50 units qhs, Accu-Cheks AC&HS.  - fs 61 this morning - repeat fs after apple juice was 112

## 2018-12-11 VITALS
TEMPERATURE: 98 F | DIASTOLIC BLOOD PRESSURE: 67 MMHG | RESPIRATION RATE: 18 BRPM | OXYGEN SATURATION: 100 % | SYSTOLIC BLOOD PRESSURE: 118 MMHG | HEART RATE: 82 BPM

## 2018-12-11 LAB
ANION GAP SERPL CALC-SCNC: 8 MMOL/L — SIGNIFICANT CHANGE UP (ref 5–17)
BUN SERPL-MCNC: 9 MG/DL — SIGNIFICANT CHANGE UP (ref 7–23)
CALCIUM SERPL-MCNC: 7.7 MG/DL — LOW (ref 8.5–10.1)
CHLORIDE SERPL-SCNC: 105 MMOL/L — SIGNIFICANT CHANGE UP (ref 96–108)
CO2 SERPL-SCNC: 29 MMOL/L — SIGNIFICANT CHANGE UP (ref 22–31)
CREAT SERPL-MCNC: 0.93 MG/DL — SIGNIFICANT CHANGE UP (ref 0.5–1.3)
GLUCOSE SERPL-MCNC: 64 MG/DL — LOW (ref 70–99)
HCT VFR BLD CALC: 26.9 % — LOW (ref 39–50)
HGB BLD-MCNC: 8.8 G/DL — LOW (ref 13–17)
MCHC RBC-ENTMCNC: 30.4 PG — SIGNIFICANT CHANGE UP (ref 27–34)
MCHC RBC-ENTMCNC: 32.7 GM/DL — SIGNIFICANT CHANGE UP (ref 32–36)
MCV RBC AUTO: 93.1 FL — SIGNIFICANT CHANGE UP (ref 80–100)
NRBC # BLD: 0 /100 WBCS — SIGNIFICANT CHANGE UP (ref 0–0)
PLATELET # BLD AUTO: 141 K/UL — LOW (ref 150–400)
POTASSIUM SERPL-MCNC: 3.6 MMOL/L — SIGNIFICANT CHANGE UP (ref 3.5–5.3)
POTASSIUM SERPL-SCNC: 3.6 MMOL/L — SIGNIFICANT CHANGE UP (ref 3.5–5.3)
RBC # BLD: 2.89 M/UL — LOW (ref 4.2–5.8)
RBC # FLD: 18.8 % — HIGH (ref 10.3–14.5)
SODIUM SERPL-SCNC: 142 MMOL/L — SIGNIFICANT CHANGE UP (ref 135–145)
WBC # BLD: 5.99 K/UL — SIGNIFICANT CHANGE UP (ref 3.8–10.5)
WBC # FLD AUTO: 5.99 K/UL — SIGNIFICANT CHANGE UP (ref 3.8–10.5)

## 2018-12-11 PROCEDURE — 86901 BLOOD TYPING SEROLOGIC RH(D): CPT

## 2018-12-11 PROCEDURE — 83605 ASSAY OF LACTIC ACID: CPT

## 2018-12-11 PROCEDURE — 85730 THROMBOPLASTIN TIME PARTIAL: CPT

## 2018-12-11 PROCEDURE — 83540 ASSAY OF IRON: CPT

## 2018-12-11 PROCEDURE — 97112 NEUROMUSCULAR REEDUCATION: CPT

## 2018-12-11 PROCEDURE — 70450 CT HEAD/BRAIN W/O DYE: CPT

## 2018-12-11 PROCEDURE — 82553 CREATINE MB FRACTION: CPT

## 2018-12-11 PROCEDURE — 82728 ASSAY OF FERRITIN: CPT

## 2018-12-11 PROCEDURE — 86850 RBC ANTIBODY SCREEN: CPT

## 2018-12-11 PROCEDURE — 96375 TX/PRO/DX INJ NEW DRUG ADDON: CPT

## 2018-12-11 PROCEDURE — 97162 PT EVAL MOD COMPLEX 30 MIN: CPT

## 2018-12-11 PROCEDURE — 97530 THERAPEUTIC ACTIVITIES: CPT

## 2018-12-11 PROCEDURE — 80076 HEPATIC FUNCTION PANEL: CPT

## 2018-12-11 PROCEDURE — 87486 CHLMYD PNEUM DNA AMP PROBE: CPT

## 2018-12-11 PROCEDURE — 96374 THER/PROPH/DIAG INJ IV PUSH: CPT

## 2018-12-11 PROCEDURE — 81001 URINALYSIS AUTO W/SCOPE: CPT

## 2018-12-11 PROCEDURE — 83690 ASSAY OF LIPASE: CPT

## 2018-12-11 PROCEDURE — 87798 DETECT AGENT NOS DNA AMP: CPT

## 2018-12-11 PROCEDURE — 86900 BLOOD TYPING SEROLOGIC ABO: CPT

## 2018-12-11 PROCEDURE — 85652 RBC SED RATE AUTOMATED: CPT

## 2018-12-11 PROCEDURE — 36415 COLL VENOUS BLD VENIPUNCTURE: CPT

## 2018-12-11 PROCEDURE — 85379 FIBRIN DEGRADATION QUANT: CPT

## 2018-12-11 PROCEDURE — 72110 X-RAY EXAM L-2 SPINE 4/>VWS: CPT

## 2018-12-11 PROCEDURE — 80048 BASIC METABOLIC PNL TOTAL CA: CPT

## 2018-12-11 PROCEDURE — 86923 COMPATIBILITY TEST ELECTRIC: CPT

## 2018-12-11 PROCEDURE — P9037: CPT

## 2018-12-11 PROCEDURE — 36430 TRANSFUSION BLD/BLD COMPNT: CPT

## 2018-12-11 PROCEDURE — 85018 HEMOGLOBIN: CPT

## 2018-12-11 PROCEDURE — 76937 US GUIDE VASCULAR ACCESS: CPT

## 2018-12-11 PROCEDURE — 97110 THERAPEUTIC EXERCISES: CPT

## 2018-12-11 PROCEDURE — 85384 FIBRINOGEN ACTIVITY: CPT

## 2018-12-11 PROCEDURE — 83036 HEMOGLOBIN GLYCOSYLATED A1C: CPT

## 2018-12-11 PROCEDURE — 72125 CT NECK SPINE W/O DYE: CPT

## 2018-12-11 PROCEDURE — 93005 ELECTROCARDIOGRAM TRACING: CPT

## 2018-12-11 PROCEDURE — 87150 DNA/RNA AMPLIFIED PROBE: CPT

## 2018-12-11 PROCEDURE — P9059: CPT

## 2018-12-11 PROCEDURE — 82746 ASSAY OF FOLIC ACID SERUM: CPT

## 2018-12-11 PROCEDURE — 87040 BLOOD CULTURE FOR BACTERIA: CPT

## 2018-12-11 PROCEDURE — 82962 GLUCOSE BLOOD TEST: CPT

## 2018-12-11 PROCEDURE — 84100 ASSAY OF PHOSPHORUS: CPT

## 2018-12-11 PROCEDURE — 82140 ASSAY OF AMMONIA: CPT

## 2018-12-11 PROCEDURE — 86140 C-REACTIVE PROTEIN: CPT

## 2018-12-11 PROCEDURE — 82009 KETONE BODYS QUAL: CPT

## 2018-12-11 PROCEDURE — 71045 X-RAY EXAM CHEST 1 VIEW: CPT

## 2018-12-11 PROCEDURE — 87633 RESP VIRUS 12-25 TARGETS: CPT

## 2018-12-11 PROCEDURE — 80053 COMPREHEN METABOLIC PANEL: CPT

## 2018-12-11 PROCEDURE — 83550 IRON BINDING TEST: CPT

## 2018-12-11 PROCEDURE — 86985 SPLIT BLOOD OR PRODUCTS: CPT

## 2018-12-11 PROCEDURE — 83735 ASSAY OF MAGNESIUM: CPT

## 2018-12-11 PROCEDURE — 85014 HEMATOCRIT: CPT

## 2018-12-11 PROCEDURE — 82607 VITAMIN B-12: CPT

## 2018-12-11 PROCEDURE — P9016: CPT

## 2018-12-11 PROCEDURE — 99285 EMERGENCY DEPT VISIT HI MDM: CPT | Mod: 25

## 2018-12-11 PROCEDURE — 85027 COMPLETE CBC AUTOMATED: CPT

## 2018-12-11 PROCEDURE — 97116 GAIT TRAINING THERAPY: CPT

## 2018-12-11 PROCEDURE — 84484 ASSAY OF TROPONIN QUANT: CPT

## 2018-12-11 PROCEDURE — 82550 ASSAY OF CK (CPK): CPT

## 2018-12-11 PROCEDURE — 36569 INSJ PICC 5 YR+ W/O IMAGING: CPT

## 2018-12-11 PROCEDURE — 85045 AUTOMATED RETICULOCYTE COUNT: CPT

## 2018-12-11 PROCEDURE — 85610 PROTHROMBIN TIME: CPT

## 2018-12-11 PROCEDURE — 82272 OCCULT BLD FECES 1-3 TESTS: CPT

## 2018-12-11 PROCEDURE — P9011: CPT

## 2018-12-11 PROCEDURE — 85385 FIBRINOGEN ANTIGEN: CPT

## 2018-12-11 PROCEDURE — 87086 URINE CULTURE/COLONY COUNT: CPT

## 2018-12-11 PROCEDURE — 82310 ASSAY OF CALCIUM: CPT

## 2018-12-11 PROCEDURE — C1889: CPT

## 2018-12-11 PROCEDURE — 87581 M.PNEUMON DNA AMP PROBE: CPT

## 2018-12-11 PROCEDURE — C1751: CPT

## 2018-12-11 PROCEDURE — 87186 SC STD MICRODIL/AGAR DIL: CPT

## 2018-12-11 PROCEDURE — 74174 CTA ABD&PLVS W/CONTRAST: CPT

## 2018-12-11 PROCEDURE — 74018 RADEX ABDOMEN 1 VIEW: CPT

## 2018-12-11 RX ORDER — LACTULOSE 10 G/15ML
15 SOLUTION ORAL
Qty: 135 | Refills: 0 | OUTPATIENT
Start: 2018-12-11 | End: 2019-01-09

## 2018-12-11 RX ORDER — PANTOPRAZOLE SODIUM 20 MG/1
1 TABLET, DELAYED RELEASE ORAL
Qty: 0 | Refills: 0 | COMMUNITY

## 2018-12-11 RX ORDER — FUROSEMIDE 40 MG
1 TABLET ORAL
Qty: 30 | Refills: 0 | OUTPATIENT
Start: 2018-12-11 | End: 2019-01-09

## 2018-12-11 RX ORDER — INSULIN GLARGINE 100 [IU]/ML
50 INJECTION, SOLUTION SUBCUTANEOUS
Qty: 0 | Refills: 0 | DISCHARGE
Start: 2018-12-11

## 2018-12-11 RX ORDER — GABAPENTIN 400 MG/1
2 CAPSULE ORAL
Qty: 240 | Refills: 0 | OUTPATIENT
Start: 2018-12-11 | End: 2019-01-09

## 2018-12-11 RX ORDER — PANTOPRAZOLE SODIUM 20 MG/1
1 TABLET, DELAYED RELEASE ORAL
Qty: 0 | Refills: 0 | COMMUNITY
Start: 2018-12-11 | End: 2019-01-09

## 2018-12-11 RX ORDER — PANTOPRAZOLE SODIUM 20 MG/1
1 TABLET, DELAYED RELEASE ORAL
Qty: 60 | Refills: 0 | OUTPATIENT
Start: 2018-12-11 | End: 2019-01-09

## 2018-12-11 RX ORDER — LACTOBACILLUS ACIDOPHILUS 100MM CELL
1 CAPSULE ORAL
Qty: 60 | Refills: 0
Start: 2018-12-11 | End: 2019-01-09

## 2018-12-11 RX ORDER — POTASSIUM CHLORIDE 20 MEQ
40 PACKET (EA) ORAL ONCE
Qty: 0 | Refills: 0 | Status: COMPLETED | OUTPATIENT
Start: 2018-12-11 | End: 2018-12-11

## 2018-12-11 RX ORDER — FUROSEMIDE 40 MG
2 TABLET ORAL
Qty: 0 | Refills: 0 | DISCHARGE
Start: 2018-12-11 | End: 2019-01-09

## 2018-12-11 RX ORDER — LACTULOSE 10 G/15ML
15 SOLUTION ORAL
Qty: 0 | Refills: 0 | COMMUNITY
Start: 2018-12-11 | End: 2019-01-09

## 2018-12-11 RX ORDER — FUROSEMIDE 40 MG
1 TABLET ORAL
Qty: 0 | Refills: 0 | COMMUNITY

## 2018-12-11 RX ORDER — IRBESARTAN 75 MG/1
1 TABLET ORAL
Qty: 0 | Refills: 0 | COMMUNITY

## 2018-12-11 RX ORDER — URSODIOL 250 MG/1
1 TABLET, FILM COATED ORAL
Qty: 90 | Refills: 0
Start: 2018-12-11 | End: 2019-01-09

## 2018-12-11 RX ORDER — SUCRALFATE 1 G
1 TABLET ORAL
Qty: 120 | Refills: 0 | OUTPATIENT
Start: 2018-12-11 | End: 2019-01-09

## 2018-12-11 RX ORDER — FUROSEMIDE 40 MG
2 TABLET ORAL
Qty: 0 | Refills: 0 | COMMUNITY
Start: 2018-12-11 | End: 2019-01-09

## 2018-12-11 RX ORDER — LACTOBACILLUS ACIDOPHILUS 100MM CELL
1 CAPSULE ORAL
Qty: 0 | Refills: 0 | COMMUNITY

## 2018-12-11 RX ADMIN — ERTAPENEM SODIUM 120 MILLIGRAM(S): 1 INJECTION, POWDER, LYOPHILIZED, FOR SOLUTION INTRAMUSCULAR; INTRAVENOUS at 11:45

## 2018-12-11 RX ADMIN — LACTULOSE 15 GRAM(S): 10 SOLUTION ORAL at 05:29

## 2018-12-11 RX ADMIN — PANTOPRAZOLE SODIUM 40 MILLIGRAM(S): 20 TABLET, DELAYED RELEASE ORAL at 05:31

## 2018-12-11 RX ADMIN — Medication 1 GRAM(S): at 11:55

## 2018-12-11 RX ADMIN — Medication 20 MILLIGRAM(S): at 05:29

## 2018-12-11 RX ADMIN — URSODIOL 300 MILLIGRAM(S): 250 TABLET, FILM COATED ORAL at 05:29

## 2018-12-11 RX ADMIN — GABAPENTIN 200 MILLIGRAM(S): 400 CAPSULE ORAL at 11:55

## 2018-12-11 RX ADMIN — Medication 1 GRAM(S): at 05:29

## 2018-12-11 RX ADMIN — Medication 1: at 11:44

## 2018-12-11 RX ADMIN — URSODIOL 300 MILLIGRAM(S): 250 TABLET, FILM COATED ORAL at 14:04

## 2018-12-11 RX ADMIN — GABAPENTIN 200 MILLIGRAM(S): 400 CAPSULE ORAL at 05:29

## 2018-12-11 RX ADMIN — Medication 40 MILLIEQUIVALENT(S): at 12:01

## 2018-12-11 NOTE — PROGRESS NOTE ADULT - ASSESSMENT
63 yo man w JEAN/NAFLD assoc cirrhosis, baseline bilirubin 2.7, adm after near syncope and found  anemia with GI bleed due to large duodenal ulcer; s/p EGD and injection of ulcer with epinephrine  Also history hx of coagulopathy due to cirrhosis and baseline chronic thrombocytopenia  Workup show adequate B12/folate/iron  On antibiotics for E coli bacteremia without UTI    -CBC appropriate incr of H/H post tx one unit PRBC  -stable from heme standpoint for planned discharge today    will sign off for now, please call if any questions

## 2018-12-11 NOTE — PROGRESS NOTE ADULT - PROBLEM SELECTOR PLAN 4
Anemia & Thrombocytopenia- Acute Blood loss Anemia with Bleeding DU - H/H Low stable. Hgb Low stable at 7.7 - will be given 1 unit prbc  Total Blood Given - 11 units pRBC, 4 units FFP, 2 units platelets  S/P EGD x 2 by GI Dr Morales   - As per Dr. Hernandez recs - consider transfusing to Hgb of 8 -- Coagulopathy 2/2 Liver disease/ Cirrhosis  Abnormal LFT's , Bili & PT/PTT/INR stable  - Dr Rios -Hem Follow up as out pt  - pt follows GI (Dr. Morales)   -cont ppi, sandra, rifaximin bid

## 2018-12-11 NOTE — PROGRESS NOTE ADULT - SUBJECTIVE AND OBJECTIVE BOX
INTERVAL HPI/OVERNIGHT EVENTS:  pt seen adn examined  feels annabelle denies n/v/d/abd pain  reports passing brown bms  per overnight rn no s/s active gib  sp 1u prbc yesterday  afebrile overnight labs noted  sp midline     MEDICATIONS  (STANDING):  atorvastatin 10 milliGRAM(s) Oral at bedtime  dextrose 5%. 1000 milliLiter(s) (50 mL/Hr) IV Continuous <Continuous>  dextrose 50% Injectable 12.5 Gram(s) IV Push once  dextrose 50% Injectable 25 Gram(s) IV Push once  dextrose 50% Injectable 25 Gram(s) IV Push once  ertapenem  IVPB 1000 milliGRAM(s) IV Intermittent every 24 hours  furosemide    Tablet 20 milliGRAM(s) Oral daily  gabapentin 200 milliGRAM(s) Oral four times a day  insulin glargine Injectable (LANTUS) 50 Unit(s) SubCutaneous at bedtime  insulin lispro (HumaLOG) corrective regimen sliding scale   SubCutaneous three times a day before meals  lactulose Syrup 15 Gram(s) Oral two times a day  pantoprazole    Tablet 40 milliGRAM(s) Oral two times a day  rifaximin 550 milliGRAM(s) Oral two times a day  sucralfate 1 Gram(s) Oral every 6 hours  ursodiol Capsule 300 milliGRAM(s) Oral three times a day    MEDICATIONS  (PRN):  acetaminophen   Tablet .. 650 milliGRAM(s) Oral every 6 hours PRN Temp greater or equal to 38C (100.4F)  aluminum hydroxide/magnesium hydroxide/simethicone Suspension 30 milliLiter(s) Oral every 6 hours PRN Dyspepsia  dextrose 40% Gel 15 Gram(s) Oral once PRN Blood Glucose LESS THAN 70 milliGRAM(s)/deciliter  glucagon  Injectable 1 milliGRAM(s) IntraMuscular once PRN Glucose LESS THAN 70 milligrams/deciliter  lidocaine 2% Gel 1 Application(s) Topical every 3 hours PRN pain  simethicone 80 milliGRAM(s) Chew every 6 hours PRN Gas      Allergies    codeine (Anaphylaxis)    Intolerances        Review of Systems:    General:  No wt loss, fevers, chills, night sweats, fatigue   Eyes:  Good vision, no reported pain  ENT:  No sore throat, pain, runny nose, dysphagia  CV:  No pain, palpitations, hypo/hypertension  Resp:  No dyspnea, cough, tachypnea, wheezing  GI:  No pain, No nausea, No vomiting, No diarrhea, No constipation, No weight loss, No fever, No pruritis, No rectal bleeding, No melena, No dysphagia  :  No pain, bleeding, incontinence, nocturia  Muscle:  No pain, weakness  Neuro:  No weakness, tingling, memory problems  Psych:  No fatigue, insomnia, mood problems, depression  Endocrine:  No polyuria, polydypsia, cold/heat intolerance  Heme:  No petechiae, ecchymosis, easy bruisability  Skin:  No rash, tattoos, scars, edema      Vital Signs Last 24 Hrs  T(C): 36.8 (11 Dec 2018 08:00), Max: 37 (10 Dec 2018 23:32)  T(F): 98.2 (11 Dec 2018 08:00), Max: 98.6 (10 Dec 2018 23:32)  HR: 92 (11 Dec 2018 09:37) (81 - 92)  BP: 105/53 (11 Dec 2018 08:00) (97/57 - 132/67)  BP(mean): --  RR: 16 (11 Dec 2018 08:00) (16 - 17)  SpO2: 97% (11 Dec 2018 09:37) (97% - 100%)    PHYSICAL EXAM:    Constitutional: NAD   HEENT: ncat  Neck: No LAD  Respiratory: dec bs  Cardiovascular: S1 and S2, RRR  Gastrointestinal: obese abd soft nt mild dt  Extremities: No peripheral edema  Vascular: 2+ peripheral pulses  Neurological: awake alert responds appropriately   Skin: no rash      LABS:                        8.8    5.99  )-----------( 141      ( 11 Dec 2018 07:40 )             26.9     12-11    142  |  105  |  9   ----------------------------<  64<L>  3.6   |  29  |  0.93    Ca    7.7<L>      11 Dec 2018 07:40            RADIOLOGY & ADDITIONAL TESTS:

## 2018-12-11 NOTE — PROGRESS NOTE ADULT - PROBLEM SELECTOR PLAN 1
- Improvement  - continue 20mg PO Lasix (home dose) 12/11/18 S/P fever ,resolved ,Nl WBC  -ESBL E Coli Bacteremia -Likely GI Source from ulcer   Repeat Blood c/s x2 -NEG  On IV Invanz IV Daily as per ID total 10 days.  MID line placed  ID Dr Gabriel hancock for d/c

## 2018-12-11 NOTE — PROGRESS NOTE ADULT - PROBLEM SELECTOR PLAN 4
resolved  likely gi source per id  repeat bld cxs ngtd  on abx sp midline  f/u id recs  dc planning in progress

## 2018-12-11 NOTE — PROGRESS NOTE ADULT - SUBJECTIVE AND OBJECTIVE BOX
Patient is a 63y old  Male who presents with a chief complaint of vomiting and near syncope (11 Dec 2018 10:07)      INTERVAL HPI: Pt is a 61 y/o M with PMHx of Type 2 DM, HTN, HLD, obesity, nephrolithiasis (last stone 25 years ago), neuropathy, HE, NAFLD, Hyper ammonemia, recent Gastritis/ esophagitis on EGD & Gram variable teddy bacteremia in 10/2018  who presents with vomiting and  near syncope/falling out of a moving car. Pt states that [place] and felt "[place]", his vision was cloudy, he pulled over the car and stopped and vomited. Vomitus was "creamy white." Pt denies seeing blood in vomitus. His dtr then took over driving and Pt again felt like he needed to vomit and told dtr to pull over. He thought the car had stopped and he opened the door and fell out of the moving car. He laid on the ground for ~ 15 minutes. Pt says he possibly lost consciousness. States when he was lying on the ground felt very cold. Pt has had "terrible heartburn" for 2 days and felt chills for 2 days. Pt denies abdominal pain, CP, palp, SOB, cough, myalgias, rash, HA. Pt admits to chills, vomiting (4 episodes), diarrhea (from lactulose), dizziness, pedal edema, tingling in hands (chronic, intermittent). Pt denies recent travel, sick contacts, different eating patterns.   Pt states that early this week pt saw her Endocrinologist & started back on Metformin 500 mg daily as per Dr's recommendation.  In ED Pt's vitals were: T(C): 36.3, HR: 91, BP: 85/59, RR: 15, SpO2: 98% on RA  Labs significant for lactate 5.6, , Hgb 10.4, total bili 3.7, alk phos 183, ammonia 72, Mg 1.4. UA - trace LE, mod blood, RBC 6-10, urine glucose 1,000.   CXR - negative chest  Abd Xray - There is mild content in the colon and no overt sense of bowel obstruction.  CT head - no intracranial hemorrhage or mass effect  CT cervical - Multilevel degenerative disc disease/cervical spondylosis, with posterior osteophyte disc complex C6-7 resulting in flattening of the cervical cord. If there is a clinical concern for spinal cord injury, recommend further evaluation with MRI if there are no clinical contraindications.  EKG - NSR, 93bpm  Pt given 2L NS bolus, Zofran pantoprazole, famotidine, Carafate , Pt seen by GI Dr Morales in ER & was admitted for further Eval.    12/1/18: Pt seen, examined, Had total 4 bloody BM so far , Lactate improved, pt had CT A/P with IV Contrast. Drop in H/H. plan for, start Protonix drip,  2 u pRBC 1 u FFP & 5 mg Vit K x 1 as d/w Dr Franks -GI, Elevated ammonia level. Coagulopathy, Improving Lactate. Ac Blood loss Anemia.    12/2/18: Pt seen, Examined, pt was transferred to ICU on 12/1/18 for symptomatic upper GI Bleed, S/P 7  units pRBC in Total , 3 FFP, 1 platelets transfusion given, Pt had emergent EGD done that found Ac Bleeding Duodenal ulcer found. S/P Epi given. Homeostasis obtained, Pt seen By Sx Dr Hernandez. H/H Stable,  today with MISA, Coagulopathy , Leukocytosis & High Lactate level.  H/H Low stable with Ac Blood  Loss Anemia. Hematology Dr Rios called, as pt is followed by Dr Rios- H/O as out pt.    12/3/18:. Patient denies any acute complaints this morning. Hgb drop from 8.2 --> 7.7. Leukocytosis present at 15.08. Lactate level drop from 3.2 --> 2.5    12/4/19: Patient was sitting out of bed in chair, awake, alert, and oriented x3. Hb dropped to 6.6.  Patient states he is feeling better since yesterday. Patient had EGD done yesterday that showed large bleeding Duodenal ulcer s/p clips x2, epi injection, and cautery. Patient was given 3 units of packed red blood cells yesterday. Hbg increased from 7.3 --> 7.8. WBC count drop from 12.53 --> 9.47. Patient tolerating ice chips and clear liquid diet. Patient denies fever, chills, headache, weakness chest pain, palpitations, sob, N/V, diarrhea, constipation, blood in stool, urinary symptoms. Patient admits to feeling fatigued. Has not had a BM today.     12/5/18: , patient lying comfortably in bed. Hgb at 7.8. WBC 9.35, patient tolerating clear liquid diet. Patient denies fevers, chills, CP, palpitations, N/V/D. As per nurse, BM are still dark. Patient is asymptomatic at this time. On Regular diet.     12/6/18: . Patient lying comfortably in bed. Patient was febrile overnight 102F. Tylenol prn, AVOID NSAIDS (risk of rebleed). Hgb drop 7.8 --> 7.4. repeat H/H 8.6.  WBC 8.63. Creatinine up .98-->1.3. Tolerating regular carbohydrate diet, ate breakfast this morning. States that this was his second meal and has not had any associated N/V. Patient states he had a BM earlier this morning that was normal in consistency without any blood in stool. Patient is asymptomatic at this time. Patients BP is in 100/50's this AM, patient states that his pressure normal runs between "100-110". Patient denies headache, fatigue, weakness, chest pain, sob, palpitations, N/V, abdominal pain, diarrhea, constipation. PT saw aptient today and alerted patient felt light-headed. Will re-assess. ID Dr Rios called. Repeat H/H stable.    12/7/18: Patient lying comfortably in bed. Has no acute complaints this morning. Patient was afebrile overnight.  fever last night ,Blood cultures are positive for "Gram negative rods & Gram variable rods." Started on Zosyn IVPB 3.375g q8h.  Hgb at 8.1. K+ drop from 3.6 -->3.1. Repleted with 40 meq Kcl tablet q4h for 2 doses. Patient reports black BM yesterday. No BM this morning. Patient admits to fatigue. Denies headache, fever, chills, SOB, chest pain, palpitations, abdominal pain, diarrhea, constipation, blood in stool, urinary symptoms.   12/8/18: Pt feels better, tired, On IV Abx, H/H low stable.  12/9/18: Pt seen, Examined, No Complaints, ON IV INVANZ for ESBL E COLI. Low stable H/H, S/P Lasix 20 mh x 1 dose for foot edema.  12/10/18: Patient seen and examined at bedside this morning. Patient states, "I feel great". Patient had a normal brown BM yesterday without blood. Patient had PICC line placed this AM for IV Abx. Patient's finger stick accucheck 61 this AM - patient had apple juice and repeat finger stick 112. Hgb low/stable at 7.7. Changed to low dose insulin coverage scale. Will give 1 unit pRBC and if BP is stable will give 20mg IV Lasix post transfusion, Patient denies headache, fever, chills, chest pain, palpitations, sob, abdominal pain, N/V, diarrhea, constipation, urinary symptoms. MID line placed in IR PT reeval done ----> HCPT  12/11/18: patient seen and examined at bedside, patient denies SOB, CP, abdominal pain, N/V/D/C. Patient receiving dose of Invanz before discharge today.       OVERNIGHT EVENTS: NONE    Home Medications:  aluminum hydroxide-magnesium hydroxide 200 mg-200 mg/5 mL oral suspension: 30 milliliter(s) orally every 6 hours, As needed, Dyspepsia (10 Dec 2018 16:01)  Bacid (LAC) oral tablet: 1 tab(s) orally 2 times a day for  2weeks (10 Dec 2018 15:36)  ertapenem 1 g injection:  injectable stop date December 18th. (10 Dec 2018 16:01)  lactulose 10 g/15 mL oral syrup: 15 milliliter(s) orally 3 times a day (10 Dec 2018 15:36)  phytonadione 100 mcg oral tablet: 1 tab(s) orally once a day (10 Dec 2018 15:36)  potassium chloride 10 mEq oral tablet, extended release: 1 tab(s) orally once a day (10 Dec 2018 15:36)  pravastatin 40 mg oral tablet: 1 tab(s) orally once a day (10 Dec 2018 15:36)  rifAXIMin 550 mg oral tablet: 1 tab(s) orally 2 times a day (10 Dec 2018 16:01)  Vitamin D3 2000 intl units oral tablet: 1 tab(s) orally once a day (10 Dec 2018 15:36)      MEDICATIONS  (STANDING):  atorvastatin 10 milliGRAM(s) Oral at bedtime  dextrose 5%. 1000 milliLiter(s) (50 mL/Hr) IV Continuous <Continuous>  dextrose 50% Injectable 12.5 Gram(s) IV Push once  dextrose 50% Injectable 25 Gram(s) IV Push once  dextrose 50% Injectable 25 Gram(s) IV Push once  ertapenem  IVPB 1000 milliGRAM(s) IV Intermittent every 24 hours  furosemide    Tablet 20 milliGRAM(s) Oral daily  gabapentin 200 milliGRAM(s) Oral four times a day  insulin glargine Injectable (LANTUS) 50 Unit(s) SubCutaneous at bedtime  insulin lispro (HumaLOG) corrective regimen sliding scale   SubCutaneous three times a day before meals  lactulose Syrup 15 Gram(s) Oral two times a day  pantoprazole    Tablet 40 milliGRAM(s) Oral two times a day  rifaximin 550 milliGRAM(s) Oral two times a day  sucralfate 1 Gram(s) Oral every 6 hours  ursodiol Capsule 300 milliGRAM(s) Oral three times a day    MEDICATIONS  (PRN):  acetaminophen   Tablet .. 650 milliGRAM(s) Oral every 6 hours PRN Temp greater or equal to 38C (100.4F)  aluminum hydroxide/magnesium hydroxide/simethicone Suspension 30 milliLiter(s) Oral every 6 hours PRN Dyspepsia  dextrose 40% Gel 15 Gram(s) Oral once PRN Blood Glucose LESS THAN 70 milliGRAM(s)/deciliter  glucagon  Injectable 1 milliGRAM(s) IntraMuscular once PRN Glucose LESS THAN 70 milligrams/deciliter  lidocaine 2% Gel 1 Application(s) Topical every 3 hours PRN pain  simethicone 80 milliGRAM(s) Chew every 6 hours PRN Gas    Allergies    codeine (Anaphylaxis)    Intolerances    REVIEW OF SYSTEMS:  CONSTITUTIONAL: No fever, No chills, No fatigue, No myalgia, No Body ache, No Weakness  EYES: No eye pain,  No visual disturbances, No discharge, NO Redness  ENMT:  No ear pain, No nose bleed, No vertigo; No sinus or throat pain, No Congestion  NECK: No pain, No stiffness  RESPIRATORY: No cough, wheezing, No  hemoptysis, No shortness of breath  CARDIOVASCULAR: No chest pain, palpitations  GASTROINTESTINAL: No abdominal or epigastric pain. No nausea, No vomiting; No diarrhea or constipation. [  ] BM  GENITOURINARY: No dysuria, No frequency, No urgency, No hematuria, or incontinence  NEUROLOGICAL: No headaches, No dizziness, No numbness, No tingling, No tremors, No weakness  EXT: No Swelling, No Pain, No Edema  SKIN:  [  ] No itching, burning, rashes, or lesions   MUSCULOSKELETAL: No joint pain or swelling; No muscle pain, No back pain, No extremity pain  PSYCHIATRIC: No depression, anxiety, mood swings or difficulty sleeping at night  PAIN SCALE: [  ] None  [  ] Other-  ROS Unable to obtain due to - [  ] Dementia  [  ] Lethargy  [  ] Sedated [  ] non verbal  REST OF REVIEW Of SYSTEM - [  ] Normal     Vital Signs Last 24 Hrs  T(C): 36.8 (11 Dec 2018 08:00), Max: 37 (10 Dec 2018 23:32)  T(F): 98.2 (11 Dec 2018 08:00), Max: 98.6 (10 Dec 2018 23:32)  HR: 92 (11 Dec 2018 09:37) (81 - 92)  BP: 105/53 (11 Dec 2018 08:00) (97/57 - 132/67)  BP(mean): --  RR: 16 (11 Dec 2018 08:00) (16 - 17)  SpO2: 97% (11 Dec 2018 09:37) (97% - 100%)  Finger Stick      12-10 @ 07:01  -  12-11 @ 07:00  --------------------------------------------------------  IN: 580 mL / OUT: 0 mL / NET: 580 mL    PHYSICAL EXAM:  GENERAL:  [ x ] NAD , [ x ] well appearing, [  ] Agitated, [  ] Drowsy,  [  ] Lethargy, [  ] confused   HEAD:  [ x ] Normal, [  ] Other  EYES:  [ x ] EOMI, [x  ] PERRLA, [  x] conjunctiva and sclera clear normal, [  ] Other,  [  ] Pallor,[  ] Discharge  ENMT:  [x  ] Normal, [x ] Moist mucous membranes, [  ] Good dentition, [  ] No Thrush  NECK:  [x  ] Supple, [ x ] No JVD, [ x ] Normal thyroid, [  ] Lymphadenopathy [  ] Other  CHEST/LUNG:  [ x ] Clear to auscultation bilaterally, [ x ] Breath Sounds equal OR Decrease,  [x  ] No rales, [ x ] No rhonchi  [x  ]  No wheezing  HEART:  [  x] Regular rate and rhythm, [  ] tachycardia, [  ] Bradycardia,  [  ] irregular  [  ] No murmurs, No rubs, No gallops, [  ] PPM in place (Mfr:  )  ABDOMEN:  [ x ] Soft, [x  ] Nontender, [ x ] Nondistended, [  ] No mass, [  ] Bowel sounds present, [ x ] obese  NERVOUS SYSTEM:  [ x ] Alert & Oriented X3, [  x] Nonfocal  [  ] Confusion  [  ] Encephalopathic [  ] Sedated [  ] Unable to assess, [  ] Other-  EXTREMITIES: [x  ] 2+ Peripheral Pulses, No clubbing, No cyanosis,  [  ] edema B/L lower EXT. [  ] PVD stasis skin changes B/L Lower EXT  LYMPH: No lymphadenopathy noted  SKIN:  [x  ] No rashes or lesions, [  ] Pressure Ulcers, [  ] ecchymosis, [  ] Skin Tears, [  ] Other    DIET: low protein, DM    LABS:                        8.8    5.99  )-----------( 141      ( 11 Dec 2018 07:40 )             26.9     11 Dec 2018 07:40    142    |  105    |  9      ----------------------------<  64     3.6     |  29     |  0.93     Ca    7.7        11 Dec 2018 07:40    Culture Results:   No growth to date. (12-07 @ 20:28)  Culture Results:   No growth to date. (12-07 @ 20:24)  Culture Results:   No growth (12-07 @ 08:12)  Culture Results:   No growth to date. (12-07 @ 07:29)  Culture Results:   Growth in aerobic and anaerobic bottles: Escherichia coli ESBL (12-06 @ 07:42)  Culture Results:   Growth in anaerobic bottle: Escherichia coli ESBL  See previous culture [id number]  "Due to technical problems, Proteus sp. will Not be reported as part of  the BCID panel until further notice"  ***Blood Panel PCR results on this specimen are available  approximately 3 hours after the Gram stain result.***  Gram stain, PCR, and/or culture results may not always  correspond due to difference in methodologies.  ************************************************************  This PCR assay was performed using [employer].  The following targets are tested for: Enterococcus,  vancomycin resistant enterococci, Listeria monocytogenes,  coagulase negative staphylococci, S. aureus,  methicillin resistant S. aureus, Streptococcus agalactiae  (Group B), S. pneumoniae, S. pyogenes (Group A),  Acinetobacter baumannii, Enterobacter cloacae, E. coli,  Klebsiella oxytoca, K. pneumoniae, Proteus sp.,  Serratia marcescens, Haemophilus influenzae,  Neisseria meningitidis, Pseudomonas aeruginosa,Candida  albicans, C. glabrata, C krusei, C parapsilosis,  C. tropicalis and the KPC resistance gene. (12-06 @ 07:42)    Culture - Blood (collected 07 Dec 2018 20:28)  Source: .Blood Blood-Venous  Preliminary Report (08 Dec 2018 21:01):    No growth to date.    Culture - Blood (collected 07 Dec 2018 20:24)  Source: .Blood Blood-Peripheral  Preliminary Report (08 Dec 2018 21:01):    No growth to date.    Culture - Urine (collected 07 Dec 2018 08:12)  Source: .Urine Clean Catch (Midstream)  Final Report (08 Dec 2018 15:37):    No growth    Culture - Blood (collected 07 Dec 2018 07:29)  Source: .Blood Blood  Preliminary Report (08 Dec 2018 08:01):    No growth to date.    Culture - Blood (collected 06 Dec 2018 07:42)  Source: .Blood Blood-Peripheral  Gram Stain (06 Dec 2018 19:53):    Growth in anaerobic bottle: Gram Variable Rods  Final Report (08 Dec 2018 16:12):    Growth in anaerobic bottle: Escherichia coli ESBL    See previous culture [id number]    "Due to technical problems, Proteus sp. will Not be reported as part of    the BCID panel until further notice"    ***Blood Panel PCR results on this specimen are available    approximately 3 hours after the Gram stain result.***    Gram stain, PCR, and/or culture results may not always    correspond due to difference in methodologies.    ************************************************************    This PCR assay was performed using [employer].    The following targets are tested for: Enterococcus,    vancomycin resistant enterococci, Listeria monocytogenes,    coagulase negative staphylococci, S. aureus,    methicillin resistant S. aureus, Streptococcus agalactiae    (Group B), S. pneumoniae, S. pyogenes (Group A),    Acinetobacter baumannii, Enterobacter cloacae, E. coli,    Klebsiella oxytoca, K. pneumoniae, Proteus sp.,    Serratia marcescens, Haemophilus influenzae,    Neisseria meningitidis, Pseudomonas aeruginosa,Candida    albicans, C. glabrata, C krusei, C parapsilosis,    C. tropicalis and the KPC resistance gene.  Organism: Blood Culture PCR (08 Dec 2018 16:12)  Organism: Blood Culture PCR (08 Dec 2018 16:12)    Culture - Blood (collected 06 Dec 2018 07:42)  Source: .Blood Blood-Peripheral  Gram Stain (06 Dec 2018 20:52):    Growth in anaerobic bottle: Gram Negative Rods    Growth in aerobic bottle: Gram Negative Rods  Final Report (08 Dec 2018 16:11):    Growth in aerobic and anaerobic bottles: Escherichia coli ESBL  Organism: Escherichia coli ESBL (08 Dec 2018 16:11)  Organism: Escherichia coli ESBL (08 Dec 2018 16:11)    RADIOLOGY & ADDITIONAL TESTS:      HEALTH ISSUES - PROBLEM Dx:  Edema extremities: Edema extremities  E coli bacteremia: E coli bacteremia  Fever: Fever  Anemia: Anemia  MISA (acute kidney injury): MISA (acute kidney injury)  GIB (gastrointestinal bleeding): GIB (gastrointestinal bleeding)  Lactic acid blood increased: Lactic acid blood increased  Prophylactic measure: Prophylactic measure  Pedal edema: Pedal edema  Neuropathy: Neuropathy  Hypercholesteremia: Hypercholesteremia  Hypertension: Hypertension  Fatty liver disease, nonalcoholic: Fatty liver disease, nonalcoholic  Hepatic encephalopathy: Hepatic encephalopathy  Diabetes: Diabetes  Near syncope: Near syncope  Vomiting: Vomiting    Consultant(s) Notes Reviewed:  [x  ] YES     Care Discussed with [X] Consultants  [  ] Patient  [  ] Family  [  ]   [  ] Social Service  [  ] RN, [  ] Physical Therapy  DVT PPX: [  ] Lovenox, [  ] S C Heparin, [  ] Coumadin, [  ] Xarelto, [  ] Eliquis, [  ] Pradaxa, [  ] IV Heparin drip, [  ] SCD [  ] Contraindication 2 to GI Bleed,[  ] Ambulation  Advanced directive: [  ] None, [  ] DNR/DNI Patient is a 63y old  Male who presents with a chief complaint of vomiting and near syncope (11 Dec 2018 10:07)      INTERVAL HPI: Pt is a 63 y/o M with PMHx of Type 2 DM, HTN, HLD, obesity, nephrolithiasis (last stone 25 years ago), neuropathy, HE, NAFLD, Hyper ammonemia, recent Gastritis/ esophagitis on EGD & Gram variable teddy bacteremia in 10/2018  who presents with vomiting and  near syncope/falling out of a moving car. Pt states [place] and felt "off", his vision was cloudy, he pulled over the car and stopped and vomited. Vomitus was "creamy white." Pt denies seeing blood in vomitus. His dtr then took over driving and Pt again felt like he needed to vomit and told dtr to pull over. He thought the car had stopped and he opened the door and fell out of the moving car. He laid on the ground for ~ 15 minutes. Pt says he possibly lost consciousness. States when he was lying on the ground felt very cold. Pt has had "terrible heartburn" for 2 days and felt chills for 2 days. Pt denies abdominal pain, CP, palp, SOB, cough, myalgias, rash, HA. Pt admits to chills, vomiting (4 episodes), diarrhea (from lactulose), dizziness, pedal edema, tingling in hands (chronic, intermittent). Pt denies recent travel, sick contacts, different eating patterns.   Pt states that early this week pt saw her Endocrinologist & started back on Metformin 500 mg daily as per Dr's recommendation.  In ED Pt's vitals were: T(C): 36.3, HR: 91, BP: 85/59, RR: 15, SpO2: 98% on RA  Labs significant for lactate 5.6, , Hgb 10.4, total bili 3.7, alk phos 183, ammonia 72, Mg 1.4. UA - trace LE, mod blood, RBC 6-10, urine glucose 1,000.   CXR - negative chest  Abd Xray - There is mild content in the colon and no overt sense of bowel obstruction.  CT head - no intracranial hemorrhage or mass effect  CT cervical - Multilevel degenerative disc disease/cervical spondylosis, with posterior osteophyte disc complex C6-7 resulting in flattening of the cervical cord. If there is a clinical concern for spinal cord injury, recommend further evaluation with MRI if there are no clinical contraindications.  EKG - NSR, 93bpm  Pt given 2L NS bolus, Zofran pantoprazole, famotidine, Carafate , Pt seen by GI Dr Morales in ER & was admitted for further Eval.    12/1/18: Pt seen, examined, Had total 4 bloody BM so far , Lactate improved, pt had CT A/P with IV Contrast. Drop in H/H. plan for, start Protonix drip,  2 u pRBC 1 u FFP & 5 mg Vit K x 1 as d/w Dr Franks -GI, Elevated ammonia level. Coagulopathy, Improving Lactate. Ac Blood loss Anemia.    12/2/18: Pt seen, Examined, pt was transferred to ICU on 12/1/18 for symptomatic upper GI Bleed, S/P 7  units pRBC in Total , 3 FFP, 1 platelets transfusion given, Pt had emergent EGD done that found Ac Bleeding Duodenal ulcer found. S/P Epi given. Homeostasis obtained, Pt seen By Sx Dr Hernandez. H/H Stable,  today with MISA, Coagulopathy , Leukocytosis & High Lactate level.  H/H Low stable with Ac Blood  Loss Anemia. Hematology Dr Rios called, as pt is followed by Dr Rios- H/O as out pt.    12/3/18:. Patient denies any acute complaints this morning. Hgb drop from 8.2 --> 7.7. Leukocytosis present at 15.08. Lactate level drop from 3.2 --> 2.5    12/4/19: Patient was sitting out of bed in chair, awake, alert, and oriented x3. Hb dropped to 6.6.  Patient states he is feeling better since yesterday. Patient had EGD done yesterday that showed large bleeding Duodenal ulcer s/p clips x2, epi injection, and cautery. Patient was given 3 units of packed red blood cells yesterday. Hbg increased from 7.3 --> 7.8. WBC count drop from 12.53 --> 9.47. Patient tolerating ice chips and clear liquid diet. Patient denies fever, chills, headache, weakness chest pain, palpitations, sob, N/V, diarrhea, constipation, blood in stool, urinary symptoms. Patient admits to feeling fatigued. Has not had a BM today.     12/5/18: , patient lying comfortably in bed. Hgb at 7.8. WBC 9.35, patient tolerating clear liquid diet. Patient denies fevers, chills, CP, palpitations, N/V/D. As per nurse, BM are still dark. Patient is asymptomatic at this time. On Regular diet.     12/6/18: . Patient lying comfortably in bed. Patient was febrile overnight 102F. Tylenol prn, AVOID NSAIDS (risk of rebleed). Hgb drop 7.8 --> 7.4. repeat H/H 8.6.  WBC 8.63. Creatinine up .98-->1.3. Tolerating regular carbohydrate diet, ate breakfast this morning. States that this was his second meal and has not had any associated N/V. Patient states he had a BM earlier this morning that was normal in consistency without any blood in stool. Patient is asymptomatic at this time. Patients BP is in 100/50's this AM, patient states that his pressure normal runs between "100-110". Patient denies headache, fatigue, weakness, chest pain, sob, palpitations, N/V, abdominal pain, diarrhea, constipation. PT saw aptient today and alerted patient felt light-headed. Will re-assess. ID Dr Rios called. Repeat H/H stable.    12/7/18: Patient lying comfortably in bed. Has no acute complaints this morning. Patient was afebrile overnight.  fever last night ,Blood cultures are positive for "Gram negative rods & Gram variable rods." Started on Zosyn IVPB 3.375g q8h.  Hgb at 8.1. K+ drop from 3.6 -->3.1. Repleted with 40 meq Kcl tablet q4h for 2 doses. Patient reports black BM yesterday. No BM this morning. Patient admits to fatigue. Denies headache, fever, chills, SOB, chest pain, palpitations, abdominal pain, diarrhea, constipation, blood in stool, urinary symptoms.   12/8/18: Pt feels better, tired, On IV Abx, H/H low stable.  12/9/18: Pt seen, Examined, No Complaints, ON IV INVANZ for ESBL E COLI. Low stable H/H, S/P Lasix 20 mh x 1 dose for foot edema.  12/10/18: Patient seen and examined at bedside this morning. Patient states, "I feel great". Patient had a normal brown BM yesterday without blood. Patient had PICC line placed this AM for IV Abx. Patient's finger stick accucheck 61 this AM - patient had apple juice and repeat finger stick 112. Hgb low/stable at 7.7. Changed to low dose insulin coverage scale. Will give 1 unit pRBC and if BP is stable will give 20mg IV Lasix post transfusion, Patient denies headache, fever, chills, chest pain, palpitations, sob, abdominal pain, N/V, diarrhea, constipation, urinary symptoms. MID line placed in IR PT reeval done ----> HCPT  12/11/18: patient seen and examined at bedside, patient denies SOB, CP, abdominal pain, N/V/D/C. Patient receiving dose of IV  Invanz before discharge today.       OVERNIGHT EVENTS: NONE    Home Medications:  aluminum hydroxide-magnesium hydroxide 200 mg-200 mg/5 mL oral suspension: 30 milliliter(s) orally every 6 hours, As needed, Dyspepsia (10 Dec 2018 16:01)  Bacid (LAC) oral tablet: 1 tab(s) orally 2 times a day for  2weeks (10 Dec 2018 15:36)  ertapenem 1 g injection:  injectable stop date December 18th. (10 Dec 2018 16:01)  lactulose 10 g/15 mL oral syrup: 15 milliliter(s) orally 3 times a day (10 Dec 2018 15:36)  phytonadione 100 mcg oral tablet: 1 tab(s) orally once a day (10 Dec 2018 15:36)  potassium chloride 10 mEq oral tablet, extended release: 1 tab(s) orally once a day (10 Dec 2018 15:36)  pravastatin 40 mg oral tablet: 1 tab(s) orally once a day (10 Dec 2018 15:36)  rifAXIMin 550 mg oral tablet: 1 tab(s) orally 2 times a day (10 Dec 2018 16:01)  Vitamin D3 2000 intl units oral tablet: 1 tab(s) orally once a day (10 Dec 2018 15:36)      MEDICATIONS  (STANDING):  atorvastatin 10 milliGRAM(s) Oral at bedtime  dextrose 5%. 1000 milliLiter(s) (50 mL/Hr) IV Continuous <Continuous>  dextrose 50% Injectable 12.5 Gram(s) IV Push once  dextrose 50% Injectable 25 Gram(s) IV Push once  dextrose 50% Injectable 25 Gram(s) IV Push once  ertapenem  IVPB 1000 milliGRAM(s) IV Intermittent every 24 hours  furosemide    Tablet 20 milliGRAM(s) Oral daily  gabapentin 200 milliGRAM(s) Oral four times a day  insulin glargine Injectable (LANTUS) 50 Unit(s) SubCutaneous at bedtime  insulin lispro (HumaLOG) corrective regimen sliding scale   SubCutaneous three times a day before meals  lactulose Syrup 15 Gram(s) Oral two times a day  pantoprazole    Tablet 40 milliGRAM(s) Oral two times a day  rifaximin 550 milliGRAM(s) Oral two times a day  sucralfate 1 Gram(s) Oral every 6 hours  ursodiol Capsule 300 milliGRAM(s) Oral three times a day    MEDICATIONS  (PRN):  acetaminophen   Tablet .. 650 milliGRAM(s) Oral every 6 hours PRN Temp greater or equal to 38C (100.4F)  aluminum hydroxide/magnesium hydroxide/simethicone Suspension 30 milliLiter(s) Oral every 6 hours PRN Dyspepsia  dextrose 40% Gel 15 Gram(s) Oral once PRN Blood Glucose LESS THAN 70 milliGRAM(s)/deciliter  glucagon  Injectable 1 milliGRAM(s) IntraMuscular once PRN Glucose LESS THAN 70 milligrams/deciliter  lidocaine 2% Gel 1 Application(s) Topical every 3 hours PRN pain  simethicone 80 milliGRAM(s) Chew every 6 hours PRN Gas    Allergies    codeine (Anaphylaxis)    Intolerances    REVIEW OF SYSTEMS: I am ready to go home.  CONSTITUTIONAL: No fever, No chills, No fatigue, No myalgia, No Body ache, No Weakness  EYES: No eye pain,  No visual disturbances, No discharge, NO Redness  ENMT:  No ear pain, No nose bleed, No vertigo; No sinus or throat pain, No Congestion  NECK: No pain, No stiffness  RESPIRATORY: No cough, wheezing, No  hemoptysis, No shortness of breath  CARDIOVASCULAR: No chest pain, palpitations  GASTROINTESTINAL: No abdominal or epigastric pain. No nausea, No vomiting; No diarrhea or constipation. [ x ] BM  GENITOURINARY: No dysuria, No frequency, No urgency, No hematuria, or incontinence  NEUROLOGICAL: No headaches, No dizziness, No numbness, No tingling, No tremors, No weakness  EXT: No Swelling, No Pain, No Edema  SKIN:  [x  ] No itching, burning, rashes, or lesions   MUSCULOSKELETAL: No joint pain or swelling; No muscle pain, No back pain, No extremity pain  PSYCHIATRIC: No depression, anxiety, mood swings or difficulty sleeping at night  PAIN SCALE: [ x ] None  [  ] Other-  ROS Unable to obtain due to - [  ] Dementia  [  ] Lethargy  [  ] Sedated [  ] non verbal  REST OF REVIEW Of SYSTEM - [ x ] Normal     Vital Signs Last 24 Hrs  T(C): 36.8 (11 Dec 2018 08:00), Max: 37 (10 Dec 2018 23:32)  T(F): 98.2 (11 Dec 2018 08:00), Max: 98.6 (10 Dec 2018 23:32)  HR: 92 (11 Dec 2018 09:37) (81 - 92)  BP: 105/53 (11 Dec 2018 08:00) (97/57 - 132/67)  BP(mean): --  RR: 16 (11 Dec 2018 08:00) (16 - 17)  SpO2: 97% (11 Dec 2018 09:37) (97% - 100%)  Finger Stick      12-10 @ 07:01  -  12-11 @ 07:00  --------------------------------------------------------  IN: 580 mL / OUT: 0 mL / NET: 580 mL    PHYSICAL EXAM: MID LINE Rt U Ext  GENERAL:  [ x ] NAD , [ x ] well appearing, [  ] Agitated, [  ] Drowsy,  [  ] Lethargy, [  ] confused   HEAD:  [ x ] Normal, [  ] Other  EYES:  [ x ] EOMI, [x  ] PERRLA, [  x] conjunctiva and sclera clear normal, [  ] Other,  [  ] Pallor,[  ] Discharge  ENMT:  [x  ] Normal, [x ] Moist mucous membranes, [  ] Good dentition, [  ] No Thrush  NECK:  [x  ] Supple, [ x ] No JVD, [ x ] Normal thyroid, [  ] Lymphadenopathy [  ] Other  CHEST/LUNG:  [ x ] Clear to auscultation bilaterally, [ x ] Breath Sounds equal OR Decrease,  [x  ] No rales, [ x ] No rhonchi  [x  ]  No wheezing  HEART:  [  x] Regular rate and rhythm, [  ] tachycardia, [  ] Bradycardia,  [  ] irregular  [ x ] No murmurs, No rubs, No gallops, [  ] PPM in place (Mfr:  )  ABDOMEN:  [ x ] Soft, [x  ] Nontender, [ x ] Nondistended, [  ] No mass, [  ] Bowel sounds present, [ x ] obese  NERVOUS SYSTEM:  [ x ] Alert & Oriented X3, [  x] Nonfocal  [  ] Confusion  [  ] Encephalopathic [  ] Sedated [  ] Unable to assess, [  ] Other-  EXTREMITIES: [x  ] 2+ Peripheral Pulses, No clubbing, No cyanosis,  [x  ]  2 + edema B/L lower EXT. [  ] PVD stasis skin changes B/L Lower EXT  LYMPH: No lymphadenopathy noted  SKIN:  [x  ] No rashes or lesions, [  ] Pressure Ulcers, [  ] ecchymosis, [  ] Skin Tears, [  ] Other    DIET: low protein, DM    LABS:                        8.8    5.99  )-----------( 141      ( 11 Dec 2018 07:40 )             26.9     11 Dec 2018 07:40    142    |  105    |  9      ----------------------------<  64     3.6     |  29     |  0.93     Ca    7.7        11 Dec 2018 07:40    Culture Results:   No growth to date. (12-07 @ 20:28)  Culture Results:   No growth to date. (12-07 @ 20:24)  Culture Results:   No growth (12-07 @ 08:12)  Culture Results:   No growth to date. (12-07 @ 07:29)  Culture Results:   Growth in aerobic and anaerobic bottles: Escherichia coli ESBL (12-06 @ 07:42)  Culture Results:   Growth in anaerobic bottle: Escherichia coli ESBL  See previous culture [id number]  "Due to technical problems, Proteus sp. will Not be reported as part of  the BCID panel until further notice"  ***Blood Panel PCR results on this specimen are available  approximately 3 hours after the Gram stain result.***  Gram stain, PCR, and/or culture results may not always  correspond due to difference in methodologies.  ************************************************************  This PCR assay was performed using [employer].  The following targets are tested for: Enterococcus,  vancomycin resistant enterococci, Listeria monocytogenes,  coagulase negative staphylococci, S. aureus,  methicillin resistant S. aureus, Streptococcus agalactiae  (Group B), S. pneumoniae, S. pyogenes (Group A),  Acinetobacter baumannii, Enterobacter cloacae, E. coli,  Klebsiella oxytoca, K. pneumoniae, Proteus sp.,  Serratia marcescens, Haemophilus influenzae,  Neisseria meningitidis, Pseudomonas aeruginosa,Candida  albicans, C. glabrata, C krusei, C parapsilosis,  C. tropicalis and the KPC resistance gene. (12-06 @ 07:42)    Culture - Blood (collected 07 Dec 2018 20:28)  Source: .Blood Blood-Venous  Preliminary Report (08 Dec 2018 21:01):    No growth to date.    Culture - Blood (collected 07 Dec 2018 20:24)  Source: .Blood Blood-Peripheral  Preliminary Report (08 Dec 2018 21:01):    No growth to date.    Culture - Urine (collected 07 Dec 2018 08:12)  Source: .Urine Clean Catch (Midstream)  Final Report (08 Dec 2018 15:37):    No growth    Culture - Blood (collected 07 Dec 2018 07:29)  Source: .Blood Blood  Preliminary Report (08 Dec 2018 08:01):    No growth to date.    Culture - Blood (collected 06 Dec 2018 07:42)  Source: .Blood Blood-Peripheral  Gram Stain (06 Dec 2018 19:53):    Growth in anaerobic bottle: Gram Variable Rods  Final Report (08 Dec 2018 16:12):    Growth in anaerobic bottle: Escherichia coli ESBL    See previous culture [id number]    "Due to technical problems, Proteus sp. will Not be reported as part of    the BCID panel until further notice"    ***Blood Panel PCR results on this specimen are available    approximately 3 hours after the Gram stain result.***    Gram stain, PCR, and/or culture results may not always    correspond due to difference in methodologies.    ************************************************************    This PCR assay was performed using [employer].    The following targets are tested for: Enterococcus,    vancomycin resistant enterococci, Listeria monocytogenes,    coagulase negative staphylococci, S. aureus,    methicillin resistant S. aureus, Streptococcus agalactiae    (Group B), S. pneumoniae, S. pyogenes (Group A),    Acinetobacter baumannii, Enterobacter cloacae, E. coli,    Klebsiella oxytoca, K. pneumoniae, Proteus sp.,    Serratia marcescens, Haemophilus influenzae,    Neisseria meningitidis, Pseudomonas aeruginosa,Candida    albicans, C. glabrata, C krusei, C parapsilosis,    C. tropicalis and the KPC resistance gene.  Organism: Blood Culture PCR (08 Dec 2018 16:12)  Organism: Blood Culture PCR (08 Dec 2018 16:12)    Culture - Blood (collected 06 Dec 2018 07:42)  Source: .Blood Blood-Peripheral  Gram Stain (06 Dec 2018 20:52):    Growth in anaerobic bottle: Gram Negative Rods    Growth in aerobic bottle: Gram Negative Rods  Final Report (08 Dec 2018 16:11):    Growth in aerobic and anaerobic bottles: Escherichia coli ESBL  Organism: Escherichia coli ESBL (08 Dec 2018 16:11)  Organism: Escherichia coli ESBL (08 Dec 2018 16:11)    RADIOLOGY & ADDITIONAL TESTS:      HEALTH ISSUES - PROBLEM Dx:  Edema extremities: Edema extremities  E coli bacteremia: E coli bacteremia  Fever: Fever  Anemia: Anemia  MISA (acute kidney injury): MISA (acute kidney injury)  GIB (gastrointestinal bleeding): GIB (gastrointestinal bleeding)  Lactic acid blood increased: Lactic acid blood increased  Prophylactic measure: Prophylactic measure  Pedal edema: Pedal edema  Neuropathy: Neuropathy  Hypercholesteremia: Hypercholesteremia  Hypertension: Hypertension  Fatty liver disease, nonalcoholic: Fatty liver disease, nonalcoholic  Hepatic encephalopathy: Hepatic encephalopathy  Diabetes: Diabetes  Near syncope: Near syncope  Vomiting: Vomiting    Consultant(s) Notes Reviewed:  [x  ] YES     Care Discussed with [X] Consultants  [x  ] Patient  [  ] Family  [ x ]   [  ] Social Service  [ x ] RN, [  ] Physical Therapy  DVT PPX: [  ] Lovenox, [  ] S C Heparin, [  ] Coumadin, [  ] Xarelto, [  ] Eliquis, [  ] Pradaxa, [  ] IV Heparin drip, [x  ] SCD [  ] Contraindication 2 to GI Bleed,[  ] Ambulation  Advanced directive: [x  ] None, [  ] DNR/DNI

## 2018-12-11 NOTE — PROGRESS NOTE ADULT - PROBLEM SELECTOR PLAN 5
-- Coagulopathy 2/2 Liver disease/ Cirrhosis  Abnormal LFT's , Bili & PT/PTT/INR stable  - Dr Rios -Hem Follow up as out pt  - pt follows GI (Dr. Morales)   -cont ppi, sandra, rifaximin bid -  chronic,  - continue 20mg PO Lasix (home dose) 12/11/18

## 2018-12-11 NOTE — PROGRESS NOTE ADULT - SUBJECTIVE AND OBJECTIVE BOX
All interim records and events noted.    up in chair, reports going home today  no bleeds      MEDICATIONS  (STANDING):  atorvastatin 10 milliGRAM(s) Oral at bedtime  dextrose 5%. 1000 milliLiter(s) (50 mL/Hr) IV Continuous <Continuous>  dextrose 50% Injectable 12.5 Gram(s) IV Push once  dextrose 50% Injectable 25 Gram(s) IV Push once  dextrose 50% Injectable 25 Gram(s) IV Push once  ertapenem  IVPB 1000 milliGRAM(s) IV Intermittent every 24 hours  furosemide    Tablet 20 milliGRAM(s) Oral daily  gabapentin 200 milliGRAM(s) Oral four times a day  insulin glargine Injectable (LANTUS) 50 Unit(s) SubCutaneous at bedtime  insulin lispro (HumaLOG) corrective regimen sliding scale   SubCutaneous three times a day before meals  lactulose Syrup 15 Gram(s) Oral two times a day  pantoprazole    Tablet 40 milliGRAM(s) Oral two times a day  rifaximin 550 milliGRAM(s) Oral two times a day  sucralfate 1 Gram(s) Oral every 6 hours  ursodiol Capsule 300 milliGRAM(s) Oral three times a day    MEDICATIONS  (PRN):  acetaminophen   Tablet .. 650 milliGRAM(s) Oral every 6 hours PRN Temp greater or equal to 38C (100.4F)  aluminum hydroxide/magnesium hydroxide/simethicone Suspension 30 milliLiter(s) Oral every 6 hours PRN Dyspepsia  dextrose 40% Gel 15 Gram(s) Oral once PRN Blood Glucose LESS THAN 70 milliGRAM(s)/deciliter  glucagon  Injectable 1 milliGRAM(s) IntraMuscular once PRN Glucose LESS THAN 70 milligrams/deciliter  lidocaine 2% Gel 1 Application(s) Topical every 3 hours PRN pain  simethicone 80 milliGRAM(s) Chew every 6 hours PRN Gas      Vital Signs Last 24 Hrs  T(C): 36.8 (11 Dec 2018 08:00), Max: 37 (10 Dec 2018 23:32)  T(F): 98.2 (11 Dec 2018 08:00), Max: 98.6 (10 Dec 2018 23:32)  HR: 92 (11 Dec 2018 09:37) (81 - 92)  BP: 105/53 (11 Dec 2018 08:00) (97/57 - 132/67)  BP(mean): --  RR: 16 (11 Dec 2018 08:00) (16 - 17)  SpO2: 97% (11 Dec 2018 09:37) (97% - 100%)    PHYSICAL EXAM  General: well developed  well nourished, in no acute distress  Head: atraumatic, normocephalic  ENT: sclera anicteric, buccal mucosa moist  Neck: supple, trachea midline, no palpable masses  CV: S1 S2, regular rate and rhythm  Lungs: clear to auscultation, no wheezes/rhonchi  Abdomen: soft, nontender, bowel sounds present, no palpable masses, pouchy  Extrem: no clubbing/cyanosis/edema  Skin: no significant increased ecchymosis/petechiae  Neuro: alert and oriented X3,  no focal deficits      LABS:             8.8    5.99  )-----------( 141      ( 12-11 @ 07:40 )             26.9                7.7    5.42  )-----------( 132      ( 12-10 @ 06:46 )             23.1                7.9    7.95  )-----------( 135      ( 12-09 @ 06:09 )             23.9       12-11    142  |  105  |  9   ----------------------------<  64<L>  3.6   |  29  |  0.93    Ca    7.7<L>      11 Dec 2018 07:40      12-07 @ 05:22  PT17.0 INR1.49  PTT26.8      RADIOLOGY & ADDITIONAL STUDIES:    IMPRESSION/RECOMMENDATIONS:

## 2018-12-11 NOTE — PROGRESS NOTE ADULT - PROBLEM SELECTOR PLAN 2
S/P fever ,resolved ,Nl WBC  -ESBL E Coli Bacteremia -Likely GI Source from ulcer   Repeat Blood c/s x2 -NEG  On IV Invanz IV Daily as per ID total 10 days.  MID line placed Upper GI bleed- hx of PUD, Esophagitis, Gastritis, non bleeding ulcers on last EGD10/18  -S/P  Bleeding Duodenal ulcer on Emergent 2 nd EGD on 12/3/ in ICU s/p clips x2, epi injection, and cautery. Pt had EGD done last weekend  - pt had multiple bloody BM 2/2 rapid upper GI bleed on admission-  - GI- Dr. Morales following  - Dr Hernandez recs: -  If further bleeding ---> plan to transfer to [facility] for IR / surgical intervention  - c/w , Protonix 40 mg 2x day Carafate q 6 hrs,  continue  - Diet advanced to Regular carbohydrate diet w/ evening snack - tolerating well  - Out pt  CBC  - Total Blood Given - 11 units pRBC, 4 units FFP, 2 units platelets
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
## 2018-12-11 NOTE — PROGRESS NOTE ADULT - PROBLEM SELECTOR PLAN 3
Upper GI bleed- hx of PUD, Esophagitis, Gastritis, non bleeding ulcers on last EGD10/18  -S/P  Bleeding Duodenal ulcer on Emergent 2 nd EGD on 12/3/ in ICU s/p clips x2, epi injection, and cautery. Pt had EGD done last weekend  - pt had multiple bloody BM 2/2 rapid upper GI bleed on admission-  - GI- Dr. Morales following  - Dr Hernandez recs: -  If further bleeding ---> plan to transfer to [facility] for IR / surgical intervention  - c/w , Protonix 40 mg 2x day Carafate q 6 hrs   - Diet advanced to Regular carbohydrate diet w/ evening snack - tolerating well  - Daily CBC  - Total Blood Given - 11 units pRBC, 4 units FFP, 2 units platelets Anemia & Thrombocytopenia- Acute Blood loss Anemia with Bleeding DU - H/H Low stable. Hgb Low stable at 7.7 - will be given 1 unit prbc  Total Blood Given - 11 units pRBC, 4 units FFP, 2 units platelets  S/P EGD x 2 by GI Dr Morales   - As per Dr. Hernandez recs - consider transfusing to Hgb of 8

## 2018-12-11 NOTE — PROGRESS NOTE ADULT - SUBJECTIVE AND OBJECTIVE BOX
Neurology follow up note    JESSICA [id number]      Interval History:    Patient feels ok no new complaints.    MEDICATIONS    acetaminophen   Tablet .. 650 milliGRAM(s) Oral every 6 hours PRN  aluminum hydroxide/magnesium hydroxide/simethicone Suspension 30 milliLiter(s) Oral every 6 hours PRN  atorvastatin 10 milliGRAM(s) Oral at bedtime  dextrose 40% Gel 15 Gram(s) Oral once PRN  dextrose 5%. 1000 milliLiter(s) IV Continuous <Continuous>  dextrose 50% Injectable 12.5 Gram(s) IV Push once  dextrose 50% Injectable 25 Gram(s) IV Push once  dextrose 50% Injectable 25 Gram(s) IV Push once  ertapenem  IVPB 1000 milliGRAM(s) IV Intermittent every 24 hours  furosemide    Tablet 20 milliGRAM(s) Oral daily  gabapentin 200 milliGRAM(s) Oral four times a day  glucagon  Injectable 1 milliGRAM(s) IntraMuscular once PRN  insulin glargine Injectable (LANTUS) 50 Unit(s) SubCutaneous at bedtime  insulin lispro (HumaLOG) corrective regimen sliding scale   SubCutaneous three times a day before meals  lactulose Syrup 15 Gram(s) Oral two times a day  lidocaine 2% Gel 1 Application(s) Topical every 3 hours PRN  pantoprazole    Tablet 40 milliGRAM(s) Oral two times a day  rifaximin 550 milliGRAM(s) Oral two times a day  simethicone 80 milliGRAM(s) Chew every 6 hours PRN  sucralfate 1 Gram(s) Oral every 6 hours  ursodiol Capsule 300 milliGRAM(s) Oral three times a day      Allergies    codeine (Anaphylaxis)    Intolerances            Vital Signs Last 24 Hrs  T(C): 36.8 (11 Dec 2018 08:00), Max: 37 (10 Dec 2018 23:32)  T(F): 98.2 (11 Dec 2018 08:00), Max: 98.6 (10 Dec 2018 23:32)  HR: 92 (11 Dec 2018 09:37) (81 - 92)  BP: 105/53 (11 Dec 2018 08:00) (97/57 - 132/67)  BP(mean): --  RR: 16 (11 Dec 2018 08:00) (16 - 17)  SpO2: 97% (11 Dec 2018 09:37) (97% - 100%)    REVIEW OF SYSTEMS:  Constitutional: No fever, chills, fatigue, generalized weakness  Eyes: no eye pain, visual disturbances, or discharge  ENT:  No difficulty hearing, tinnitus, vertigo; No sinus or throat pain  Neck: No pain or stiffness  Respiratory: No cough, dyspnea, wheezing   Cardiovascular: No chest pain, palpitations,   Gastrointestinal: No abdominal or epigastric pain. No nausea, vomiting  No diarrhea or constipation.   Genitourinary: No dysuria, frequency, hematuria or incontinence  Neurological: No headaches, OCC  lightheadedness, no vertigo, numbness or tremors  Psychiatric: No depression, anxiety, mood swings or difficulty sleeping  Musculoskeletal: No joint pain or swelling; No muscle, back or extremity pain  Skin: No itching, burning, rashes or lesions   Lymph Nodes: No enlarged glands  Endocrine: No heat or cold intolerance; No hair loss   Allergy and Immunologic: No hives or eczema    On Neurological Examination:    The patient is awake, alert, oriented x3.    Extraocular movements were intact.    Pupils equal, round and reactive bilaterally, 3 mm to 2.    Speech was fluent.  Smile symmetric.    Motor:  Bilateral upper and lower 4+/5.    Sensory:  Bilateral upper and lower intact to light touch.  No drift.  Finger-to-nose within normal limits.    Follow simple commands      GENERAL Exam: Nontoxic , No Acute Distress   	  HEENT:  normocephalic, atraumatic  		  LUNGS: Clear bilaterally    	  HEART: Normal S1S2   No murmur RRR        	  GI/ ABDOMEN:  Soft  Non tender    EXTREMITIES:   No Edema  No Clubbing  No Cyanosis    	   SKIN: Normal  No Ecchymosis               LABS:  CBC Full  -  ( 11 Dec 2018 07:40 )  WBC Count : 5.99 K/uL  Hemoglobin : 8.8 g/dL  Hematocrit : 26.9 %  Platelet Count - Automated : 141 K/uL  Mean Cell Volume : 93.1 fl  Mean Cell Hemoglobin : 30.4 pg  Mean Cell Hemoglobin Concentration : 32.7 gm/dL  Auto Neutrophil # : x  Auto Lymphocyte # : x  Auto Monocyte # : x  Auto Eosinophil # : x  Auto Basophil # : x  Auto Neutrophil % : x  Auto Lymphocyte % : x  Auto Monocyte % : x  Auto Eosinophil % : x  Auto Basophil % : x      12-11    142  |  105  |  9   ----------------------------<  64<L>  3.6   |  29  |  0.93    Ca    7.7<L>      11 Dec 2018 07:40      Hemoglobin A1C:       Vitamin B12         RADIOLOGY    ANALYSIS AND PLAN:  A 62-year-old with episode of loss of consciousness.  1.	For episode of loss of consciousness, suspect most likely a syncopal event with a prodrome of feeling lightheaded, blurry vision, warm and sweaty, nausea and vomiting, as per spouse looked pale and becoming in the emergency room hypotensive points towards cerebral hypoperfusion.  There is no clear sign on examination to suggest a new cerebrovascular accident has ensued and there is no clear history to suggest underlying epilepsy.  2.	I would recommend telemetry evaluation, rule out underlying arrhythmia.  3.	Monitor systolic blood pressure.  4.	For history of diabetes, would recommend strict control of the patient's blood sugars.  5.	For history of neuropathy, continue the patient on his gabapentin.  6.	For episode of spasms, no new events   7.	 Ada,  Her telephone number is [phone]  8.	monitor H/H   9.	no new events   10.	monitor oral intake   11.	Mid line placement   12.	physical therapy  if possible and as tolerated   13.	overall doing better neurologic wise    Neurologic standpoint only cleared for discharge planning       Greater than 40 minutes spent in direct patient care reviewing  the notes, lab data/ imaging , discussion with multidisciplinary team.

## 2018-12-11 NOTE — PROGRESS NOTE ADULT - PROBLEM SELECTOR PLAN 2
cont rifaximin  cont lactulose titrate for 3 soft bms/day  trend ammonia levels  monitor stool output  diet restrictions dw pt

## 2018-12-11 NOTE — PROGRESS NOTE ADULT - SUBJECTIVE AND OBJECTIVE BOX
pt seen  no complaints  ICU Vital Signs Last 24 Hrs  T(C): 36.8 (11 Dec 2018 08:00), Max: 37 (10 Dec 2018 23:32)  T(F): 98.2 (11 Dec 2018 08:00), Max: 98.6 (10 Dec 2018 23:32)  HR: 92 (11 Dec 2018 09:37) (81 - 92)  BP: 105/53 (11 Dec 2018 08:00) (97/57 - 132/67)  BP(mean): --  ABP: --  ABP(mean): --  RR: 16 (11 Dec 2018 08:00) (16 - 17)  SpO2: 97% (11 Dec 2018 09:37) (97% - 100%)  gen-NAD  resp-clear  abd-soft Nt/ND                          8.8    5.99  )-----------( 141      ( 11 Dec 2018 07:40 )             26.9

## 2018-12-11 NOTE — PROGRESS NOTE ADULT - PROVIDER SPECIALTY LIST ADULT
Anesthesia
Critical Care
Gastroenterology
Heme/Onc
Infectious Disease
Internal Medicine
Intervent Radiology
Neurology
Surgery
Internal Medicine
Neurology
Neurology
Internal Medicine
Internal Medicine

## 2018-12-11 NOTE — PROGRESS NOTE ADULT - PROBLEM SELECTOR PLAN 6
-A1C -improved, still elevated  -Hypoglycemia Protocol, Low Dose Insulin Sliding Coverage, Lantus 50 units qhs, Accu-Cheks AC&HS.  - fs 61 this morning - repeat fs after apple juice was 112

## 2018-12-11 NOTE — PROGRESS NOTE ADULT - PROBLEM SELECTOR PLAN 1
ugib, resolved  sp egd- large du with clot, sp epi w hemostasis  sp repeat egd, 4 clips placed, injected w epi and cauterized   no s/s active gib  serial cbc, transfuse prn  hold ac asa nsaids  cont ppi and carafate  surgery following  diet as tolerated  monitor abd exam  will need close outpatient gi f/u upon dc  dc planning per primary team

## 2018-12-11 NOTE — PROGRESS NOTE ADULT - PROBLEM SELECTOR PLAN 7
2/2 Cirrhosis -ammonia level improved ? 2/2 GI Bleed ; Pt not confused at baseline MS, no asterixis  - Ammonia level stable  on lactulose 2x day    rifaximin 550 BID 2/2 Cirrhosis -ammonia level improved ? 2/2 GI Bleed   -; Pt not confused at baseline MS, no asterixis  - Ammonia level stable  on lactulose 2x day    rifaximin 550 BID

## 2018-12-11 NOTE — PROGRESS NOTE ADULT - SUBJECTIVE AND OBJECTIVE BOX
JESSICA MARTIN is a 62yMale , patient examined and chart reviewed.      INTERVAL HPI/ OVERNIGHT EVENTS:  Afebrile. No events.  Doing well.    Past Medical History--  PAST MEDICAL & SURGICAL HISTORY:  GERD with esophagitis: Gastritis &amp; Non Bleeding Ulcers  Hepatic encephalopathy  Obesity  Fatty liver disease, nonalcoholic  Renal stones: 25 years ago  Hypertension  Neuropathy  Hypercholesteremia  Diabetes  S/P cholecystectomy      For details regarding the patient's social history, family history, and other miscellaneous elements, please refer the initial infectious diseases consultation and/or the admitting history and physical examination for this admission.    ROS:  CONSTITUTIONAL:  no fever or chills, feels well, good appetite  EYES:  Negative  blurry vision or double vision  CARDIOVASCULAR:  Negative for chest pain or palpitations  RESPIRATORY:  Negative for cough, wheezing, or SOB   GASTROINTESTINAL:  Negative for nausea, vomiting, diarrhea, constipation, or abdominal pain  GENITOURINARY:  Negative frequency, urgency , dysuria or hematuria   NEUROLOGIC:  No headache, confusion, dizziness, lightheadedness  All other systems were reviewed and are negative     ALLERGIES:  codeine (Anaphylaxis)    Current inpatient medications :    ANTIBIOTICS/RELEVANT:  ertapenem  IVPB 1000 milliGRAM(s) IV Intermittent every 24 hours    MEDICATIONS  (STANDING):  atorvastatin 10 milliGRAM(s) Oral at bedtime  dextrose 5%. 1000 milliLiter(s) (50 mL/Hr) IV Continuous <Continuous>  dextrose 50% Injectable 12.5 Gram(s) IV Push once  dextrose 50% Injectable 25 Gram(s) IV Push once  dextrose 50% Injectable 25 Gram(s) IV Push once  furosemide    Tablet 20 milliGRAM(s) Oral daily  gabapentin 200 milliGRAM(s) Oral four times a day  insulin glargine Injectable (LANTUS) 50 Unit(s) SubCutaneous at bedtime  insulin lispro (HumaLOG) corrective regimen sliding scale   SubCutaneous three times a day before meals  lactulose Syrup 15 Gram(s) Oral two times a day  pantoprazole    Tablet 40 milliGRAM(s) Oral two times a day  rifaximin 550 milliGRAM(s) Oral two times a day  sucralfate 1 Gram(s) Oral every 6 hours  ursodiol Capsule 300 milliGRAM(s) Oral three times a day    MEDICATIONS  (PRN):  acetaminophen   Tablet .. 650 milliGRAM(s) Oral every 6 hours PRN Temp greater or equal to 38C (100.4F)  aluminum hydroxide/magnesium hydroxide/simethicone Suspension 30 milliLiter(s) Oral every 6 hours PRN Dyspepsia  dextrose 40% Gel 15 Gram(s) Oral once PRN Blood Glucose LESS THAN 70 milliGRAM(s)/deciliter  glucagon  Injectable 1 milliGRAM(s) IntraMuscular once PRN Glucose LESS THAN 70 milligrams/deciliter  lidocaine 2% Gel 1 Application(s) Topical every 3 hours PRN pain  simethicone 80 milliGRAM(s) Chew every 6 hours PRN Gas      Objective:  Vital Signs Last 24 Hrs  T(C): 36.7 (11 Dec 2018 11:37), Max: 37 (10 Dec 2018 23:32)  T(F): 98 (11 Dec 2018 11:37), Max: 98.6 (10 Dec 2018 23:32)  HR: 82 (11 Dec 2018 11:37) (82 - 92)  BP: 118/67 (11 Dec 2018 11:37) (97/57 - 132/67)  RR: 18 (11 Dec 2018 11:37) (16 - 18)  SpO2: 100% (11 Dec 2018 11:37) (97% - 100%)    Physical Exam:  GEN: NAD, pleasant  HEENT: normocephalic and atraumatic. EOMI. GREYSON. Moist mucosa. Clear Posterior pharynx.  NECK: Supple. No carotid bruits.  No lymphadenopathy or thyromegaly.  LUNGS: Clear to auscultation.  HEART: Regular rate and rhythm without murmur.  ABDOMEN: Soft, nontender, and nondistended.  Positive bowel sounds.  No hepatosplenomegaly was noted.  EXTREMITIES: Without any cyanosis, clubbing, rash, lesions or edema.  NEUROLOGIC: A & O x3, No focal neurological deficits   SKIN: No ulceration or induration present.      LABS:                                   8.8    5.99  )-----------( 141      ( 11 Dec 2018 07:40 )             26.9   12-11    142  |  105  |  9   ----------------------------<  64<L>  3.6   |  29  |  0.93    Ca    7.7<L>      11 Dec 2018 07:40      MICROBIOLOGY:    Culture - Blood (collected 07 Dec 2018 20:28)  Source: .Blood Blood-Venous  Preliminary Report (08 Dec 2018 21:01):    No growth to date.    Culture - Blood (collected 07 Dec 2018 20:24)  Source: .Blood Blood-Peripheral  Preliminary Report (08 Dec 2018 21:01):    No growth to date.    Culture - Urine (collected 07 Dec 2018 08:12)  Source: .Urine Clean Catch (Midstream)  Final Report (08 Dec 2018 15:37):    No growth    Culture - Blood (collected 07 Dec 2018 07:29)  Source: .Blood Blood  Preliminary Report (08 Dec 2018 08:01):    No growth to date.    Culture - Blood (12.06.18 @ 07:42)    -  Amikacin: S <=8    -  Ampicillin: R >16 These ampicillin results predict results for amoxicillin    -  Ampicillin/Sulbactam: R 16/8    -  Aztreonam: R >16    -  Cefazolin: R >16    -  Cefepime: R >16    -  Cefoxitin: S <=4    -  Ceftriaxone: R >32 Enterobacter, Citrobacter, and Serratia may develop resistance during prolonged therapy    -  Ciprofloxacin: R >2    -  Ertapenem: S <=0.5    -  Gentamicin: S <=1    -  Imipenem: S <=1    -  Levofloxacin: R >4    -  Meropenem: S <=1    -  Piperacillin/Tazobactam: R <=8    -  Tobramycin: S <=2    -  Trimethoprim/Sulfamethoxazole: S <=0.5/9.5    Gram Stain:   Growth in anaerobic bottle: Gram Negative Rods  Growth in aerobic bottle: Gram Negative Rods    Specimen Source: .Blood Blood-Peripheral    Organism: Escherichia coli ESBL    Culture Results:   Growth in aerobic and anaerobic bottles: Escherichia coli ESBL    Organism Identification: Escherichia coli ESBL    Method Type: LEONARDO        RADIOLOGY & ADDITIONAL STUDIES:    EXAM:  CT ANGIO ABD PELV (W)AW IC                            PROCEDURE DATE:  12/01/2018          INTERPRETATION:   Cirrhosis, coagulopathic. Evaluate for GI bleed.    CTA abdomen and pelvis prior CT abdomen 10/17/2018  190 cc Omnipaque 350 injected intravenously  Axial images augmented by coronal sagittal reformats 3-D MIP images.    Limited. Suboptimal arterial phase imaging secondary to poor bolus.  No definite evidence of active arterial extravasation. No pooling noted   on the venous phase.  Multiple upper abdominal and retroperitoneal varices similar to prior.  Status post cholecystectomy. Stable prominence of the common duct   attributed postcholecystectomy change. Liver shrunken suggestive of   cirrhosis. Pancreas spleen unremarkable.  No renal abnormalities.  No ascites.  Colonic diverticula without diverticulitis or other acute bowel   inflammation. No bowel obstruction.  Normal size prostate with calcination. Bladder not remarkable.  No acute skeletal abnormality.    Impression:    Limited. Suboptimal arterial phase.  No obvious active gastrointestinal hemorrhage. If warranted scintigraphy   might be considered for additional evaluation.  Additional findings as discussed      EXAM:  XR CHEST PORTABLE URGENT 1V                            PROCEDURE DATE:  12/06/2018          INTERPRETATION:      INDICATION: Fever syncopal episode collapse    Single frontal view of the chest compared to prior study of 12/1/2018.    FINDINGS/  IMPRESSION:       No acute consolidation. There is no pleural effusion. There is no   pneumothorax.  The cardiac silhouette is within normal limits.  There is   degenerative changes.      Assessment :   Pt is a 63 YO M with PMHx obesity, DM 2, HTN, HLD, nephrolithiasis, neuropathy, poss JEAN  admitted with GIB sp egd now with new fevers with Ecoli ESBL bacteremia  Source likely GI   UTI ruled out  CXR clear  WBC wnl  Overall stable    Plan :   Cont Invanz x 10 days  Dc home today    D/w Dr RAMY Arreguin    Continue with present regiment.  Appropriate use of antibiotics and adverse effects reviewed.    I have discussed the above plan of care with patient/ family in detail. They expressed understanding of the the treatment plan . Risks, benefits and alternatives discussed in detail. I have asked if they have any questions or concerns and appropriately addressed them to the best of my ability .    25 minutes reviewing notes, labs data/ imaging , discussion with multidisciplinary team.    Thank you for allowing me to participate in care of your patient .        Tanya Rios MD  Infectious Disease  [phone]

## 2018-12-11 NOTE — PROGRESS NOTE ADULT - ATTENDING COMMENTS
Pt seen, examined, case 7 acre plan d/w pt, residents at detail.  D/C Home with Home care, Iv Abx, CBC, CMP as out pt,   D/W Pt at detail,  D/c home , Total d/c care time is 45 minutes.

## 2018-12-11 NOTE — PROGRESS NOTE ADULT - REASON FOR ADMISSION
vomiting and near syncope
vomiting and near syncope, upper GI bleeding
vomiting and near syncope, upper GI bleeding
vomiting and near syncope

## 2018-12-12 LAB
CULTURE RESULTS: SIGNIFICANT CHANGE UP
SPECIMEN SOURCE: SIGNIFICANT CHANGE UP

## 2019-01-23 NOTE — PROGRESS NOTE ADULT - PROBLEM SELECTOR PLAN 5
Physical Therapy     Patient did not attend nor call to cancel  today's (1/23/2019) scheduled appointment.       ? compliant with insulin; Pt uses insulin samples due to high cost  -A1C -improved, still elevated  -Hypoglycemia Protocol, Low Dose Insulin Sliding Coverage, Lantus 22 units qhs, Accu-Cheks AC&HS.  -Diet: clear liquid diet  -Follow up HbA1c.  -Hold home medications: Humalog 20 breakfast & lunch, Humalog 30 dinner-pre meals ,  toujeo 50 u SC at night ? compliant with insulin; Pt uses insulin samples due to high cost  -A1C -improved, still elevated  -Hypoglycemia Protocol, Low Dose Insulin Sliding Coverage, Lantus 50 units qhs, Accu-Cheks AC&HS.  -Diet: clear liquid diet  -Follow up HbA1c.  -Hold home medications: Humalog 20 breakfast & lunch, Humalog 30 dinner-pre meals ,  toujeo 50 u SC at night

## 2019-02-14 NOTE — ED ADULT NURSE NOTE - NS ED NOTE  TALK SOMEONE YN
Telephone Encounter by Meseret Queen MD at 11/19/18 10:11 AM     Author:  Meseret Queen MD Service:  (none) Author Type:  Physician     Filed:  11/19/18 10:12 AM Encounter Date:  11/18/2018 Status:  Signed     :  Meseret Queen MD (Physician)            Last month when I treated him I did it based on my exam and his sx.   I think since he has a recurrence of sx so soon, we should do ct of abdomen/pelvis - confirm if for sure diverticulitis and then if so, we will restart[JC1.1M] antibiotics[JC1.1T]   Please order stat CT[JC1.1M]      Revision History        User Key Date/Time User Provider Type Action    > JC1.1 11/19/18 10:12 AM Meseret Queen MD Physician Sign    M - Manual, T - Template             No

## 2019-04-03 ENCOUNTER — INPATIENT (INPATIENT)
Facility: HOSPITAL | Age: 64
LOS: 4 days | Discharge: HOME | DRG: 377 | End: 2019-04-08
Attending: INTERNAL MEDICINE | Admitting: INTERNAL MEDICINE
Payer: MEDICARE

## 2019-04-03 VITALS
WEIGHT: 286.6 LBS | SYSTOLIC BLOOD PRESSURE: 112 MMHG | DIASTOLIC BLOOD PRESSURE: 70 MMHG | HEIGHT: 72 IN | HEART RATE: 80 BPM | OXYGEN SATURATION: 99 % | RESPIRATION RATE: 14 BRPM | TEMPERATURE: 99 F

## 2019-04-03 DIAGNOSIS — E11.9 TYPE 2 DIABETES MELLITUS WITHOUT COMPLICATIONS: ICD-10-CM

## 2019-04-03 DIAGNOSIS — R10.9 UNSPECIFIED ABDOMINAL PAIN: ICD-10-CM

## 2019-04-03 DIAGNOSIS — E87.2 ACIDOSIS: ICD-10-CM

## 2019-04-03 DIAGNOSIS — Z90.49 ACQUIRED ABSENCE OF OTHER SPECIFIED PARTS OF DIGESTIVE TRACT: Chronic | ICD-10-CM

## 2019-04-03 DIAGNOSIS — K72.90 HEPATIC FAILURE, UNSPECIFIED WITHOUT COMA: ICD-10-CM

## 2019-04-03 DIAGNOSIS — E78.00 PURE HYPERCHOLESTEROLEMIA, UNSPECIFIED: ICD-10-CM

## 2019-04-03 DIAGNOSIS — K52.9 NONINFECTIVE GASTROENTERITIS AND COLITIS, UNSPECIFIED: ICD-10-CM

## 2019-04-03 DIAGNOSIS — K92.2 GASTROINTESTINAL HEMORRHAGE, UNSPECIFIED: ICD-10-CM

## 2019-04-03 DIAGNOSIS — Z29.9 ENCOUNTER FOR PROPHYLACTIC MEASURES, UNSPECIFIED: ICD-10-CM

## 2019-04-03 DIAGNOSIS — K74.60 UNSPECIFIED CIRRHOSIS OF LIVER: ICD-10-CM

## 2019-04-03 DIAGNOSIS — I10 ESSENTIAL (PRIMARY) HYPERTENSION: ICD-10-CM

## 2019-04-03 DIAGNOSIS — R73.09 OTHER ABNORMAL GLUCOSE: ICD-10-CM

## 2019-04-03 PROBLEM — K21.0 GASTRO-ESOPHAGEAL REFLUX DISEASE WITH ESOPHAGITIS: Chronic | Status: ACTIVE | Noted: 2018-12-01

## 2019-04-03 LAB
ACETONE SERPL-MCNC: NEGATIVE — SIGNIFICANT CHANGE UP
ALBUMIN SERPL ELPH-MCNC: 2.4 G/DL — LOW (ref 3.3–5)
ALP SERPL-CCNC: 273 U/L — HIGH (ref 40–120)
ALT FLD-CCNC: 29 U/L — SIGNIFICANT CHANGE UP (ref 12–78)
AMYLASE P1 CFR SERPL: 26 U/L — SIGNIFICANT CHANGE UP (ref 25–115)
ANION GAP SERPL CALC-SCNC: 14 MMOL/L — SIGNIFICANT CHANGE UP (ref 5–17)
APPEARANCE UR: ABNORMAL
AST SERPL-CCNC: 42 U/L — HIGH (ref 15–37)
BACTERIA # UR AUTO: ABNORMAL
BASE EXCESS BLDA CALC-SCNC: 1.6 MMOL/L — SIGNIFICANT CHANGE UP (ref -2–2)
BASOPHILS # BLD AUTO: 0.02 K/UL — SIGNIFICANT CHANGE UP (ref 0–0.2)
BASOPHILS NFR BLD AUTO: 0.2 % — SIGNIFICANT CHANGE UP (ref 0–2)
BILIRUB SERPL-MCNC: 5 MG/DL — HIGH (ref 0.2–1.2)
BILIRUB UR-MCNC: NEGATIVE — SIGNIFICANT CHANGE UP
BLOOD GAS COMMENTS ARTERIAL: SIGNIFICANT CHANGE UP
BUN SERPL-MCNC: 37 MG/DL — HIGH (ref 7–23)
CALCIUM SERPL-MCNC: 9.8 MG/DL — SIGNIFICANT CHANGE UP (ref 8.5–10.1)
CHLORIDE SERPL-SCNC: 104 MMOL/L — SIGNIFICANT CHANGE UP (ref 96–108)
CO2 SERPL-SCNC: 23 MMOL/L — SIGNIFICANT CHANGE UP (ref 22–31)
COLOR SPEC: YELLOW — SIGNIFICANT CHANGE UP
CREAT SERPL-MCNC: 1.3 MG/DL — SIGNIFICANT CHANGE UP (ref 0.5–1.3)
DIFF PNL FLD: ABNORMAL
EOSINOPHIL # BLD AUTO: 0 K/UL — SIGNIFICANT CHANGE UP (ref 0–0.5)
EOSINOPHIL NFR BLD AUTO: 0 % — SIGNIFICANT CHANGE UP (ref 0–6)
EPI CELLS # UR: SIGNIFICANT CHANGE UP
GLUCOSE SERPL-MCNC: 399 MG/DL — HIGH (ref 70–99)
GLUCOSE UR QL: 1000 MG/DL
GRAN CASTS # UR COMP ASSIST: ABNORMAL /LPF
HCO3 BLDA-SCNC: 26 MMOL/L — SIGNIFICANT CHANGE UP (ref 23–27)
HCT VFR BLD CALC: 36.8 % — LOW (ref 39–50)
HCV AB S/CO SERPL IA: 0.14 S/CO — SIGNIFICANT CHANGE UP (ref 0–0.79)
HCV AB SERPL-IMP: SIGNIFICANT CHANGE UP
HGB BLD-MCNC: 12.6 G/DL — LOW (ref 13–17)
HOROWITZ INDEX BLDA+IHG-RTO: 21 — SIGNIFICANT CHANGE UP
IMM GRANULOCYTES NFR BLD AUTO: 0.5 % — SIGNIFICANT CHANGE UP (ref 0–1.5)
KETONES UR-MCNC: ABNORMAL
LACTATE SERPL-SCNC: 3.4 MMOL/L — HIGH (ref 0.7–2)
LACTATE SERPL-SCNC: 4.8 MMOL/L — CRITICAL HIGH (ref 0.7–2)
LACTATE SERPL-SCNC: 7.2 MMOL/L — CRITICAL HIGH (ref 0.7–2)
LEUKOCYTE ESTERASE UR-ACNC: NEGATIVE — SIGNIFICANT CHANGE UP
LIDOCAIN IGE QN: 52 U/L — LOW (ref 73–393)
LYMPHOCYTES # BLD AUTO: 0.92 K/UL — LOW (ref 1–3.3)
LYMPHOCYTES # BLD AUTO: 10.8 % — LOW (ref 13–44)
MCHC RBC-ENTMCNC: 32.3 PG — SIGNIFICANT CHANGE UP (ref 27–34)
MCHC RBC-ENTMCNC: 34.2 GM/DL — SIGNIFICANT CHANGE UP (ref 32–36)
MCV RBC AUTO: 94.4 FL — SIGNIFICANT CHANGE UP (ref 80–100)
MONOCYTES # BLD AUTO: 0.43 K/UL — SIGNIFICANT CHANGE UP (ref 0–0.9)
MONOCYTES NFR BLD AUTO: 5 % — SIGNIFICANT CHANGE UP (ref 2–14)
NEUTROPHILS # BLD AUTO: 7.12 K/UL — SIGNIFICANT CHANGE UP (ref 1.8–7.4)
NEUTROPHILS NFR BLD AUTO: 83.5 % — HIGH (ref 43–77)
NITRITE UR-MCNC: NEGATIVE — SIGNIFICANT CHANGE UP
NRBC # BLD: 0 /100 WBCS — SIGNIFICANT CHANGE UP (ref 0–0)
PCO2 BLDA: 33 MMHG — SIGNIFICANT CHANGE UP (ref 32–46)
PH BLDA: 7.49 — HIGH (ref 7.35–7.45)
PH UR: 5 — SIGNIFICANT CHANGE UP (ref 5–8)
PLATELET # BLD AUTO: 125 K/UL — LOW (ref 150–400)
PO2 BLDA: 72 MMHG — LOW (ref 74–108)
POTASSIUM SERPL-MCNC: 4.5 MMOL/L — SIGNIFICANT CHANGE UP (ref 3.5–5.3)
POTASSIUM SERPL-SCNC: 4.5 MMOL/L — SIGNIFICANT CHANGE UP (ref 3.5–5.3)
PROT SERPL-MCNC: 6.4 G/DL — SIGNIFICANT CHANGE UP (ref 6–8.3)
PROT UR-MCNC: 25 MG/DL
RBC # BLD: 3.9 M/UL — LOW (ref 4.2–5.8)
RBC # FLD: 19.1 % — HIGH (ref 10.3–14.5)
RBC CASTS # UR COMP ASSIST: ABNORMAL /HPF (ref 0–4)
SAO2 % BLDA: 95 % — SIGNIFICANT CHANGE UP (ref 92–96)
SODIUM SERPL-SCNC: 141 MMOL/L — SIGNIFICANT CHANGE UP (ref 135–145)
SP GR SPEC: 1.01 — SIGNIFICANT CHANGE UP (ref 1.01–1.02)
UROBILINOGEN FLD QL: NEGATIVE — SIGNIFICANT CHANGE UP
WBC # BLD: 8.53 K/UL — SIGNIFICANT CHANGE UP (ref 3.8–10.5)
WBC # FLD AUTO: 8.53 K/UL — SIGNIFICANT CHANGE UP (ref 3.8–10.5)
WBC UR QL: ABNORMAL

## 2019-04-03 PROCEDURE — 74177 CT ABD & PELVIS W/CONTRAST: CPT | Mod: 26

## 2019-04-03 PROCEDURE — 99285 EMERGENCY DEPT VISIT HI MDM: CPT

## 2019-04-03 PROCEDURE — 93010 ELECTROCARDIOGRAM REPORT: CPT

## 2019-04-03 RX ORDER — FUROSEMIDE 40 MG
40 TABLET ORAL DAILY
Qty: 0 | Refills: 0 | Status: DISCONTINUED | OUTPATIENT
Start: 2019-04-03 | End: 2019-04-03

## 2019-04-03 RX ORDER — SUCRALFATE 1 G
1 TABLET ORAL EVERY 6 HOURS
Qty: 0 | Refills: 0 | Status: DISCONTINUED | OUTPATIENT
Start: 2019-04-03 | End: 2019-04-08

## 2019-04-03 RX ORDER — INSULIN LISPRO 100/ML
VIAL (ML) SUBCUTANEOUS AT BEDTIME
Qty: 0 | Refills: 0 | Status: DISCONTINUED | OUTPATIENT
Start: 2019-04-03 | End: 2019-04-08

## 2019-04-03 RX ORDER — DEXTROSE 50 % IN WATER 50 %
12.5 SYRINGE (ML) INTRAVENOUS ONCE
Qty: 0 | Refills: 0 | Status: DISCONTINUED | OUTPATIENT
Start: 2019-04-03 | End: 2019-04-08

## 2019-04-03 RX ORDER — DEXTROSE 50 % IN WATER 50 %
25 SYRINGE (ML) INTRAVENOUS ONCE
Qty: 0 | Refills: 0 | Status: DISCONTINUED | OUTPATIENT
Start: 2019-04-03 | End: 2019-04-08

## 2019-04-03 RX ORDER — ONDANSETRON 8 MG/1
4 TABLET, FILM COATED ORAL ONCE
Qty: 0 | Refills: 0 | Status: COMPLETED | OUTPATIENT
Start: 2019-04-03 | End: 2019-04-03

## 2019-04-03 RX ORDER — INSULIN LISPRO 100/ML
VIAL (ML) SUBCUTANEOUS
Qty: 0 | Refills: 0 | Status: DISCONTINUED | OUTPATIENT
Start: 2019-04-03 | End: 2019-04-08

## 2019-04-03 RX ORDER — SODIUM CHLORIDE 9 MG/ML
1000 INJECTION INTRAMUSCULAR; INTRAVENOUS; SUBCUTANEOUS ONCE
Qty: 0 | Refills: 0 | Status: COMPLETED | OUTPATIENT
Start: 2019-04-03 | End: 2019-04-03

## 2019-04-03 RX ORDER — IOHEXOL 300 MG/ML
30 INJECTION, SOLUTION INTRAVENOUS ONCE
Qty: 0 | Refills: 0 | Status: COMPLETED | OUTPATIENT
Start: 2019-04-03 | End: 2019-04-03

## 2019-04-03 RX ORDER — URSODIOL 250 MG/1
300 TABLET, FILM COATED ORAL THREE TIMES A DAY
Qty: 0 | Refills: 0 | Status: DISCONTINUED | OUTPATIENT
Start: 2019-04-03 | End: 2019-04-08

## 2019-04-03 RX ORDER — DEXTROSE 50 % IN WATER 50 %
15 SYRINGE (ML) INTRAVENOUS ONCE
Qty: 0 | Refills: 0 | Status: DISCONTINUED | OUTPATIENT
Start: 2019-04-03 | End: 2019-04-08

## 2019-04-03 RX ORDER — SODIUM CHLORIDE 9 MG/ML
3 INJECTION INTRAMUSCULAR; INTRAVENOUS; SUBCUTANEOUS ONCE
Qty: 0 | Refills: 0 | Status: COMPLETED | OUTPATIENT
Start: 2019-04-03 | End: 2019-04-03

## 2019-04-03 RX ORDER — GLUCAGON INJECTION, SOLUTION 0.5 MG/.1ML
1 INJECTION, SOLUTION SUBCUTANEOUS ONCE
Qty: 0 | Refills: 0 | Status: DISCONTINUED | OUTPATIENT
Start: 2019-04-03 | End: 2019-04-08

## 2019-04-03 RX ORDER — CHOLECALCIFEROL (VITAMIN D3) 125 MCG
2000 CAPSULE ORAL DAILY
Qty: 0 | Refills: 0 | Status: DISCONTINUED | OUTPATIENT
Start: 2019-04-03 | End: 2019-04-08

## 2019-04-03 RX ORDER — LACTULOSE 10 G/15ML
10 SOLUTION ORAL
Qty: 0 | Refills: 0 | Status: DISCONTINUED | OUTPATIENT
Start: 2019-04-03 | End: 2019-04-08

## 2019-04-03 RX ORDER — SODIUM CHLORIDE 9 MG/ML
1000 INJECTION, SOLUTION INTRAVENOUS
Qty: 0 | Refills: 0 | Status: DISCONTINUED | OUTPATIENT
Start: 2019-04-03 | End: 2019-04-08

## 2019-04-03 RX ORDER — POTASSIUM CHLORIDE 20 MEQ
10 PACKET (EA) ORAL DAILY
Qty: 0 | Refills: 0 | Status: DISCONTINUED | OUTPATIENT
Start: 2019-04-03 | End: 2019-04-08

## 2019-04-03 RX ORDER — PANTOPRAZOLE SODIUM 20 MG/1
40 TABLET, DELAYED RELEASE ORAL
Qty: 0 | Refills: 0 | Status: DISCONTINUED | OUTPATIENT
Start: 2019-04-03 | End: 2019-04-04

## 2019-04-03 RX ORDER — INSULIN GLARGINE 100 [IU]/ML
25 INJECTION, SOLUTION SUBCUTANEOUS AT BEDTIME
Qty: 0 | Refills: 0 | Status: DISCONTINUED | OUTPATIENT
Start: 2019-04-03 | End: 2019-04-04

## 2019-04-03 RX ORDER — ONDANSETRON 8 MG/1
4 TABLET, FILM COATED ORAL EVERY 6 HOURS
Qty: 0 | Refills: 0 | Status: DISCONTINUED | OUTPATIENT
Start: 2019-04-03 | End: 2019-04-05

## 2019-04-03 RX ORDER — SODIUM CHLORIDE 9 MG/ML
1000 INJECTION, SOLUTION INTRAVENOUS
Qty: 0 | Refills: 0 | Status: DISCONTINUED | OUTPATIENT
Start: 2019-04-03 | End: 2019-04-04

## 2019-04-03 RX ORDER — METOCLOPRAMIDE HCL 10 MG
5 TABLET ORAL ONCE
Qty: 0 | Refills: 0 | Status: COMPLETED | OUTPATIENT
Start: 2019-04-03 | End: 2019-04-03

## 2019-04-03 RX ORDER — GABAPENTIN 400 MG/1
200 CAPSULE ORAL
Qty: 0 | Refills: 0 | Status: DISCONTINUED | OUTPATIENT
Start: 2019-04-03 | End: 2019-04-08

## 2019-04-03 RX ADMIN — Medication 5 MILLIGRAM(S): at 21:19

## 2019-04-03 RX ADMIN — SODIUM CHLORIDE 75 MILLILITER(S): 9 INJECTION, SOLUTION INTRAVENOUS at 18:59

## 2019-04-03 RX ADMIN — ONDANSETRON 4 MILLIGRAM(S): 8 TABLET, FILM COATED ORAL at 06:55

## 2019-04-03 RX ADMIN — SODIUM CHLORIDE 1000 MILLILITER(S): 9 INJECTION INTRAMUSCULAR; INTRAVENOUS; SUBCUTANEOUS at 10:45

## 2019-04-03 RX ADMIN — URSODIOL 300 MILLIGRAM(S): 250 TABLET, FILM COATED ORAL at 21:21

## 2019-04-03 RX ADMIN — Medication 40 MILLIGRAM(S): at 18:17

## 2019-04-03 RX ADMIN — LACTULOSE 10 GRAM(S): 10 SOLUTION ORAL at 18:18

## 2019-04-03 RX ADMIN — SODIUM CHLORIDE 3 MILLILITER(S): 9 INJECTION INTRAMUSCULAR; INTRAVENOUS; SUBCUTANEOUS at 06:50

## 2019-04-03 RX ADMIN — IOHEXOL 30 MILLILITER(S): 300 INJECTION, SOLUTION INTRAVENOUS at 09:26

## 2019-04-03 RX ADMIN — Medication 1 GRAM(S): at 18:18

## 2019-04-03 RX ADMIN — ONDANSETRON 4 MILLIGRAM(S): 8 TABLET, FILM COATED ORAL at 13:15

## 2019-04-03 RX ADMIN — PANTOPRAZOLE SODIUM 40 MILLIGRAM(S): 20 TABLET, DELAYED RELEASE ORAL at 18:17

## 2019-04-03 RX ADMIN — SODIUM CHLORIDE 1000 MILLILITER(S): 9 INJECTION INTRAMUSCULAR; INTRAVENOUS; SUBCUTANEOUS at 08:00

## 2019-04-03 RX ADMIN — SODIUM CHLORIDE 1000 MILLILITER(S): 9 INJECTION INTRAMUSCULAR; INTRAVENOUS; SUBCUTANEOUS at 06:50

## 2019-04-03 RX ADMIN — SODIUM CHLORIDE 1000 MILLILITER(S): 9 INJECTION INTRAMUSCULAR; INTRAVENOUS; SUBCUTANEOUS at 08:24

## 2019-04-03 RX ADMIN — URSODIOL 300 MILLIGRAM(S): 250 TABLET, FILM COATED ORAL at 16:16

## 2019-04-03 RX ADMIN — INSULIN GLARGINE 25 UNIT(S): 100 INJECTION, SOLUTION SUBCUTANEOUS at 21:40

## 2019-04-03 RX ADMIN — Medication 8: at 16:15

## 2019-04-03 RX ADMIN — GABAPENTIN 200 MILLIGRAM(S): 400 CAPSULE ORAL at 18:18

## 2019-04-03 RX ADMIN — ONDANSETRON 4 MILLIGRAM(S): 8 TABLET, FILM COATED ORAL at 18:21

## 2019-04-03 NOTE — H&P ADULT - PROBLEM SELECTOR PLAN 4
- glucose 399  - evaluate for possible DKA  - continue Moderate ISS TID, and Low ISS at bedtime  - will hold lantus for now, will reevaluate - glucose 399  - evaluate for possible DKA  - continue Moderate ISS TID, and Low ISS at bedtime  - will hold lantus for now  - f/u endo consult Dr Perlman - glucose 399, Pt did not take His Insulin yesterday.  - evaluate for possible DKA  - continue Moderate ISS TID, and Low ISS at bedtime  - will hold Lantus for now  - f/u endo consult Dr Perlman

## 2019-04-03 NOTE — H&P ADULT - NEGATIVE ENMT SYMPTOMS
no nasal congestion/no hearing difficulty/no vertigo no gum bleeding/no throat pain/no dysphagia/no vertigo/no hearing difficulty/no nasal congestion

## 2019-04-03 NOTE — CONSULT NOTE ADULT - PROBLEM SELECTOR RECOMMENDATION 2
? pud   ct reviewed  ppi bid  no signs of obstruction  elevated lfts however pt is sp cholecystectomy

## 2019-04-03 NOTE — H&P ADULT - HISTORY OF PRESENT ILLNESS
Patient is a 63 y/o M with PMHx of Type 2 DM on insulin, HTN, HLD, obesity, nephrolithiasis, neuropathy, Hepatic encephalopathy, NAFLD, cirrhosis, Hyperammonemia,  Gastritis/ esophagitis, recent admission to [facility] on 12/2018 for syncope, UGIB s/p EGD, duodenal ulcer, s/p clips and cauterization, and e. coli bacteremia, presented to ED after 2 days of abdominal pain with nausea and vomiting. Reported multiple episodes of brown colored emesis. Patient also reported recent BMs that are dark in color. Patient stated that he f/u with GI outpatient on Monday without any significant concerns. Patient is a 61 y/o M with PMHx of Type 2 DM on insulin, HTN, HLD, obesity, nephrolithiasis, neuropathy, Hepatic encephalopathy, NAFLD, cirrhosis, Hyperammonemia,  Gastritis/ esophagitis, recent admission to [facility] on 12/2018 for syncope, UGIB s/p EGD, duodenal ulcer, s/p clips and cauterization, and e. coli bacteremia, presented to ED after 2 days of abdominal pain with nausea and vomiting. Reported multiple episodes of brown colored emesis. Patient also reported recent BMs that are dark in color, no diarrhea. Patient stated that he f/u with GI outpatient on Monday without any significant concerns. Patient also reported his son at home is also sick at home recently with similar symptoms of nausea, vomiting, and abdominal pain. Patient denies any headache, dizziness, visual changes, chest pain, sob, gamez, diarrhea, dysuria, or hematuria.     In the ED, initially VSS, most recent vs showed tachycardia 113bpm, and 150/78. Patient on room air  WBC wnl, h/h 12.6/36/8, plt 125  Initial lactate 7.2 --> 4.8 s/p 2 IVF bolus  Na 141, K 4.5, bicarb 23, anion gap serum 14, BUN 37, Cr 1.3, Glucose 399  albumin 2.4, Tbili 5, alk phos 273, Ast 42, GFR 58  lipase 52, amylase 26, serum acetone negative  UA; significant for RBC 25-50, WBC 6-10, protein 25, small ketones, moderate blood, granular cast, 1000 glucose  CT abdomen and pelvis w/ PO and IV contrast: Cirrhosis and portal hypertension. Mild, nonspecific haziness at the root of the small bowel mesentery; correlate clinically for pancreatitis. Pyloric and duodenal bulb thickening, recommend further clinical correlation for peptic ulcer disease.   EKG showed sinus tachy  Received 2x IVF bolus and 1x zofran in ED. Patient is a 63 y/o M with PMHx of Type 2 DM on insulin, HTN, HLD, obesity, nephrolithiasis, neuropathy, Hepatic encephalopathy, NAFLD, cirrhosis, Hyperammonemia,  Gastritis/ esophagitis, recent admission to [facility] on 12/2018 for syncope, UGIB s/p EGD, duodenal ulcer, s/p clips and cauterization, and e. coli bacteremia, presented to ED after 2 days of abdominal pain with nausea and vomiting. Reported multiple episodes of brown colored emesis. Patient also reported recent BMs that are dark in color, no diarrhea. Patient stated that he f/u with GI outpatient on Monday without any significant concerns. Patient also reported his son at home is also sick at home recently with similar symptoms of nausea, vomiting, and abdominal pain. Patient denies any headache, dizziness, visual changes, chest pain, sob, gamez, diarrhea, dysuria, or hematuria.  As per pt he has NOT taken his insulin on Tuesday as he was vomiting,    In the ED, initially VSS, most recent vs showed tachycardia 113bpm, and 150/78. Patient on room air  WBC wnl, h/h 12.6/36/8, plt 125  Initial lactate 7.2 --> 4.8 s/p 2 IVF bolus  Na 141, K 4.5, bicarb 23, anion gap serum 14, BUN 37, Cr 1.3, Glucose 399  albumin 2.4, Tbili 5, alk phos 273, Ast 42, GFR 58  lipase 52, amylase 26, serum acetone negative  UA; significant for RBC 25-50, WBC 6-10, protein 25, small ketones, moderate blood, granular cast, 1000 glucose  CT abdomen and pelvis w/ PO and IV contrast: Cirrhosis and portal hypertension. Mild, nonspecific haziness at the root of the small bowel mesentery; correlate clinically for pancreatitis. Pyloric and duodenal bulb thickening, recommend further clinical correlation for peptic ulcer disease.   EKG showed sinus tachy  Received 2x IVF bolus and 1x Zofran in ED.

## 2019-04-03 NOTE — H&P ADULT - ASSESSMENT
Patient is a 63 y/o M with PMHx of Type 2 DM on insulin, HTN, HLD, obesity, nephrolithiasis, neuropathy, Hepatic encephalopathy, NAFLD, cirrhosis, Hyperammonemia,  Gastritis/ esophagitis, recent admission to [facility] on 12/2018 for syncope, UGIB s/p EGD, duodenal ulcer, s/p clips and cauterization, and e. coli bacteremia, presented to ED after 2 days of abdominal pain with nausea and vomiting, admit for evaluation of gastroenteritis, noted elevated glucose, lactic acidosis w/o leukocytosis or fever, r/o DKA, ABG, serum ketone pending.

## 2019-04-03 NOTE — ED PROVIDER NOTE - CONSTITUTIONAL, MLM
normal... Well appearing, well nourished, awake, alert, oriented to person, place, time/situation and in no apparent distress. Morbid obesity

## 2019-04-03 NOTE — PATIENT PROFILE ADULT - NSPROGENOTHERPROVIDER_GEN_A_NUR
DR Dillan Coto [phone]  Dr Morales [facility] [phone]  Dr Jose Enrique Grace eye [phone]/outpatient care

## 2019-04-03 NOTE — ED PROVIDER NOTE - OBJECTIVE STATEMENT
63 year old male with a history of IDDM, HTN, GERD, HLD, hepatic encephalopathy, presents with abdominal pain and vomiting x 24 hours.  Patient states he vomited about 15 times in total non-bloody.  Approximately 2 hours ago, he developed generalized sharp abdominal pain.  He has not been able to tolerate PO and has not checked his BS for 3 days (it is usually around 200 in the morning.)  He denies fevers, diarrhea, chest pain, SOB.  He has not taken any medication for the symptoms.  PMD Dillan Coto, no endocrinologist.

## 2019-04-03 NOTE — H&P ADULT - PROBLEM SELECTOR PLAN 5
- MISS TID, YOSELIN at bedtime  - hypoglycemic protocol   - consistent carb - MISS TID, YOSELIN at bedtime  - hypoglycemic protocol   - consistent carb diet  - f/u hgA1c - MISS TID, YOSELIN at bedtime, lantus 25 bedtime   - hypoglycemic protocol   - consistent carb diet  - f/u hgA1c - MISS TID, YOSELIN at bedtime, Lantus 25 bedtime   -FS Q AC HS with HISS  - hypoglycemic protocol   - consistent carb diet  - f/u hgA1c

## 2019-04-03 NOTE — ED ADULT NURSE REASSESSMENT NOTE - NS ED NURSE REASSESS COMMENT FT1
Patient was given zofran at this time as per Dr. Youngblood patient can get ice chips and was given to patient advise patient that it's the change of shift and will be meeting the morning shift nurse remided patient to givce urine for UA.

## 2019-04-03 NOTE — H&P ADULT - PROBLEM SELECTOR PLAN 3
- elevated lactate at 7.2 which improved to 4.8  - no fever, no wbc count, does not meet SIRS or sepsis criteria at this time  - glucose 399, anion gap with corrected sodium and albumin: 23  - no indications for abx at this time  - r/o DKA, f/u serum ketone and ABG - elevated lactate at 7.2 which improved to 4.8  - no fever, no wbc count, does not meet SIRS or sepsis criteria at this time  - glucose 399, anion gap with corrected sodium and albumin: 23  - no indications for abx at this time  - r/o DKA, f/u serum ketone and ABG  - f/u repeat lactate - elevated lactate at 7.2 which improved to 4.8  - no fever, no wbc count, does not meet SIRS or sepsis criteria at this time  - glucose 399, anion gap with corrected sodium and albumin: 23  - no indications for abx at this time  - r/o DKA, f/u serum ketone and ABG  - f/u repeat lactate, IV Fluids.

## 2019-04-03 NOTE — ED ADULT NURSE NOTE - NSIMPLEMENTINTERV_GEN_ALL_ED
Implemented All Universal Safety Interventions:  [place] to call system. Call bell, personal items and telephone within reach. Instruct patient to call for assistance. Room bathroom lighting operational. Non-slip footwear when patient is off stretcher. Physically safe environment: no spills, clutter or unnecessary equipment. Stretcher in lowest position, wheels locked, appropriate side rails in place.

## 2019-04-03 NOTE — H&P ADULT - NEUROLOGICAL DETAILS
normal strength/responds to pain/responds to verbal commands/alert and oriented x 3 normal strength/responds to pain/cranial nerves intact/responds to verbal commands/sensation intact/alert and oriented x 3

## 2019-04-03 NOTE — H&P ADULT - PROBLEM SELECTOR PLAN 1
- unspecific generalized abdominal pain x2 days with nausea and vomiting, admitted to son at home with similar symptoms, possible viral gastroenteritis  - abdominal exam unremarkable, no fever or elevated wbc  - noted elevated lactate, need to evaluate for other causes of lactate acidosis  - hx of GIB s/p clips and cauterization, reported brown color emesis and dark color stool. Evaluated by GI, recommended gentle hydration, PPI BID, liquid diet, zofran PRN.   - f/u FOBT, monitor h/h for possible GIB  - CT abdomen with Po and IV contrast showed PUD, cirrhosis, ?pancreatitis (neg amylase and lipase)  - trend LFTs, avoid hepatotoxic medications   - elevated glucose, unspecific abdominal pain with lactic acidosis, ketones in urine, r/o DKA, f/u ABG, f/u serum ketone - unspecific generalized abdominal pain x2 days with nausea and vomiting, admitted to son at home with similar symptoms, possible viral gastroenteritis  - abdominal exam unremarkable, no fever or elevated wbc  - noted elevated lactate, need to evaluate for other causes of lactate acidosis  - hx of GIB s/p clips and cauterization, reported brown color emesis and dark color stool. Evaluated by GI, recommended gentle hydration, PPI BID, liquid diet, Zofran PRN.   - f/u FOBT, monitor h/h for possible GIB  - CT abdomen with Po and IV contrast showed PUD, cirrhosis, ?pancreatitis (neg amylase and lipase), Likely Gastr enteritis  - trend LFTs, avoid hepatotoxic medications   - elevated glucose, unspecific abdominal pain with lactic acidosis, ketones in urine, Doubt DKA, f/u ABG, f/u serum ketone

## 2019-04-03 NOTE — ED ADULT NURSE NOTE - OBJECTIVE STATEMENT
Patient complaining of diffuse abdominal pain with pain level 10/10 started yesterday with nausea, vomiting seen and evaluated by MD with orders made and carried out blood drawn and sent to lab.

## 2019-04-03 NOTE — H&P ADULT - PROBLEM SELECTOR PLAN 10
- hold pharmacological dvt ppx for now due to possible GIB, f/u FOBT  - SCDs b/l for dvt ppx, ambulate as tolerated with walker

## 2019-04-03 NOTE — H&P ADULT - NSICDXPASTMEDICALHX_GEN_ALL_CORE_FT
PAST MEDICAL HISTORY:  Diabetes     Fatty liver disease, nonalcoholic     GERD with esophagitis Gastritis & Non Bleeding Ulcers    GIB (gastrointestinal bleeding)     Hepatic encephalopathy     Hypercholesteremia     Hypertension     Neuropathy     Obesity     Renal stones 25 years ago

## 2019-04-03 NOTE — H&P ADULT - NSHPSOCIALHISTORY_GEN_ALL_CORE
Denies tobacco use, denies recent etoh use (previous etoh use was >2 years ago, denies hx of etoh abuse), denies drug abuse  ambulates with cane/walker occasionally at home

## 2019-04-03 NOTE — ED PROVIDER NOTE - PROGRESS NOTE DETAILS
d/w katia (gi) he requets admit to jose manuel stephenson (med) and maribel (gi) will see pt. d/w jose manuel stephenson (med), she will admit pt

## 2019-04-03 NOTE — H&P ADULT - PROBLEM SELECTOR PLAN 7
- continue home med lactulose, urodiol   - f/u GI rec - continue home med lactulose, ursodiol, rifaximin   - f/u GI rec - continue home med lactulose, ursodiol, rifaximin   - f/u GI rec  - f/u ammonia - continue home med lactulose, ursodiol, rifaximin   - f/u GI rec - Dr Franks  - f/u ammonia level

## 2019-04-03 NOTE — ED ADULT NURSE NOTE - PMH
Diabetes    Fatty liver disease, nonalcoholic    GERD with esophagitis  Gastritis & Non Bleeding Ulcers  Hepatic encephalopathy    Hypercholesteremia    Hypertension    Neuropathy    Obesity    Renal stones  25 years ago

## 2019-04-03 NOTE — H&P ADULT - PROBLEM SELECTOR PLAN 9
- hold home med statin for now, elevated liver enzymes - hold home med statin for now, elevated liver enzymes  - f/u lipid panel

## 2019-04-03 NOTE — H&P ADULT - RS GEN PE MLT RESP DETAILS PC
no rales/no wheezes/no rhonchi/breath sounds equal/respirations non-labored/clear to auscultation bilaterally

## 2019-04-03 NOTE — ED PROVIDER NOTE - CLINICAL SUMMARY MEDICAL DECISION MAKING FREE TEXT BOX
63 year old male presents with abdominal pain and vomiting x 24 hours.  Labs, lactate, analgesia, zofran, hydrate, CT, consult surgery as needed

## 2019-04-03 NOTE — H&P ADULT - PROBLEM SELECTOR PLAN 6
- hx of GIB, reported brown colored emesis and dark color stools on admission  - will f/u FOBT  - trend h/h   - f/u GI rec  - PPI BID, carafate - hx of GIB, reported brown colored emesis and dark color stools on admission  - will f/u FOBT  - trend h/h   - f/u GI rec  - PPI BID, Carafate

## 2019-04-03 NOTE — PATIENT PROFILE ADULT - ABILITY TO HEAR (WITH HEARING AID OR HEARING APPLIANCE IF NORMALLY USED):
right ear unable to hear/Mildly to Moderately Impaired: difficulty hearing in some environments or speaker may need to increase volume or speak distinctly

## 2019-04-03 NOTE — H&P ADULT - PROBLEM SELECTOR PLAN 2
- evaluated by GI (Dr. Franks)   - clear liquid diet, advance per symptomatic improvement and GI rec  - zofran prn for nausea  - gentle hydration as needed - evaluated by GI (Dr. Franks)   - clear liquid diet, advance per symptomatic improvement and GI rec  - Zofran prn for nausea  - gentle hydration as needed

## 2019-04-03 NOTE — H&P ADULT - GASTROINTESTINAL DETAILS
nontender/no bruit/no rebound tenderness/no rigidity/no guarding/no distention/soft no bruit/no rebound tenderness/nontender/no organomegaly/no guarding/no distention/no masses palpable/soft/no rigidity

## 2019-04-03 NOTE — H&P ADULT - NEGATIVE GENERAL GENITOURINARY SYMPTOMS
no hematuria/normal urinary frequency/no dysuria/no urinary hesitancy/no flank pain R/no flank pain L

## 2019-04-04 ENCOUNTER — TRANSCRIPTION ENCOUNTER (OUTPATIENT)
Age: 64
End: 2019-04-04

## 2019-04-04 DIAGNOSIS — D64.9 ANEMIA, UNSPECIFIED: ICD-10-CM

## 2019-04-04 DIAGNOSIS — E11.65 TYPE 2 DIABETES MELLITUS WITH HYPERGLYCEMIA: ICD-10-CM

## 2019-04-04 LAB
ALBUMIN SERPL ELPH-MCNC: 2.3 G/DL — LOW (ref 3.3–5)
ALP SERPL-CCNC: 174 U/L — HIGH (ref 40–120)
ALT FLD-CCNC: 23 U/L — SIGNIFICANT CHANGE UP (ref 12–78)
AMMONIA BLD-MCNC: 66 UMOL/L — HIGH (ref 11–32)
ANION GAP SERPL CALC-SCNC: 9 MMOL/L — SIGNIFICANT CHANGE UP (ref 5–17)
APTT BLD: 31.4 SEC — SIGNIFICANT CHANGE UP (ref 28.5–37)
AST SERPL-CCNC: 33 U/L — SIGNIFICANT CHANGE UP (ref 15–37)
BASOPHILS # BLD AUTO: 0.04 K/UL — SIGNIFICANT CHANGE UP (ref 0–0.2)
BASOPHILS NFR BLD AUTO: 0.6 % — SIGNIFICANT CHANGE UP (ref 0–2)
BILIRUB SERPL-MCNC: 4.5 MG/DL — HIGH (ref 0.2–1.2)
BUN SERPL-MCNC: 31 MG/DL — HIGH (ref 7–23)
CALCIUM SERPL-MCNC: 8.2 MG/DL — LOW (ref 8.5–10.1)
CHLORIDE SERPL-SCNC: 106 MMOL/L — SIGNIFICANT CHANGE UP (ref 96–108)
CHOLEST SERPL-MCNC: 100 MG/DL — SIGNIFICANT CHANGE UP (ref 10–199)
CO2 SERPL-SCNC: 27 MMOL/L — SIGNIFICANT CHANGE UP (ref 22–31)
CREAT SERPL-MCNC: 0.93 MG/DL — SIGNIFICANT CHANGE UP (ref 0.5–1.3)
EOSINOPHIL # BLD AUTO: 0.01 K/UL — SIGNIFICANT CHANGE UP (ref 0–0.5)
EOSINOPHIL NFR BLD AUTO: 0.1 % — SIGNIFICANT CHANGE UP (ref 0–6)
GLUCOSE SERPL-MCNC: 256 MG/DL — HIGH (ref 70–99)
HBA1C BLD-MCNC: 10.5 % — HIGH (ref 4–5.6)
HCT VFR BLD CALC: 31.2 % — LOW (ref 39–50)
HDLC SERPL-MCNC: 40 MG/DL — SIGNIFICANT CHANGE UP
HGB BLD-MCNC: 10.4 G/DL — LOW (ref 13–17)
IMM GRANULOCYTES NFR BLD AUTO: 0.4 % — SIGNIFICANT CHANGE UP (ref 0–1.5)
INR BLD: 1.55 RATIO — HIGH (ref 0.88–1.16)
LACTATE SERPL-SCNC: 2.1 MMOL/L — HIGH (ref 0.7–2)
LACTATE SERPL-SCNC: 2.2 MMOL/L — HIGH (ref 0.7–2)
LIPID PNL WITH DIRECT LDL SERPL: 45 MG/DL — SIGNIFICANT CHANGE UP
LYMPHOCYTES # BLD AUTO: 0.92 K/UL — LOW (ref 1–3.3)
LYMPHOCYTES # BLD AUTO: 12.9 % — LOW (ref 13–44)
MCHC RBC-ENTMCNC: 32.1 PG — SIGNIFICANT CHANGE UP (ref 27–34)
MCHC RBC-ENTMCNC: 33.3 GM/DL — SIGNIFICANT CHANGE UP (ref 32–36)
MCV RBC AUTO: 96.3 FL — SIGNIFICANT CHANGE UP (ref 80–100)
MONOCYTES # BLD AUTO: 0.54 K/UL — SIGNIFICANT CHANGE UP (ref 0–0.9)
MONOCYTES NFR BLD AUTO: 7.6 % — SIGNIFICANT CHANGE UP (ref 2–14)
NEUTROPHILS # BLD AUTO: 5.61 K/UL — SIGNIFICANT CHANGE UP (ref 1.8–7.4)
NEUTROPHILS NFR BLD AUTO: 78.4 % — HIGH (ref 43–77)
NRBC # BLD: 0 /100 WBCS — SIGNIFICANT CHANGE UP (ref 0–0)
OB PNL STL: POSITIVE
PLATELET # BLD AUTO: 79 K/UL — LOW (ref 150–400)
POTASSIUM SERPL-MCNC: 3.5 MMOL/L — SIGNIFICANT CHANGE UP (ref 3.5–5.3)
POTASSIUM SERPL-SCNC: 3.5 MMOL/L — SIGNIFICANT CHANGE UP (ref 3.5–5.3)
PROT SERPL-MCNC: 5.6 G/DL — LOW (ref 6–8.3)
PROTHROM AB SERPL-ACNC: 17.9 SEC — HIGH (ref 10–12.9)
RBC # BLD: 3.24 M/UL — LOW (ref 4.2–5.8)
RBC # FLD: 18.9 % — HIGH (ref 10.3–14.5)
SODIUM SERPL-SCNC: 142 MMOL/L — SIGNIFICANT CHANGE UP (ref 135–145)
TOTAL CHOLESTEROL/HDL RATIO MEASUREMENT: 2.5 RATIO — LOW (ref 3.4–9.6)
TRIGL SERPL-MCNC: 73 MG/DL — SIGNIFICANT CHANGE UP (ref 10–149)
WBC # BLD: 7.15 K/UL — SIGNIFICANT CHANGE UP (ref 3.8–10.5)
WBC # FLD AUTO: 7.15 K/UL — SIGNIFICANT CHANGE UP (ref 3.8–10.5)

## 2019-04-04 RX ORDER — INSULIN GLARGINE 100 [IU]/ML
35 INJECTION, SOLUTION SUBCUTANEOUS AT BEDTIME
Qty: 0 | Refills: 0 | Status: DISCONTINUED | OUTPATIENT
Start: 2019-04-04 | End: 2019-04-06

## 2019-04-04 RX ORDER — INSULIN GLARGINE 100 [IU]/ML
25 INJECTION, SOLUTION SUBCUTANEOUS AT BEDTIME
Qty: 0 | Refills: 0 | Status: DISCONTINUED | OUTPATIENT
Start: 2019-04-04 | End: 2019-04-04

## 2019-04-04 RX ORDER — METOCLOPRAMIDE HCL 10 MG
10 TABLET ORAL ONCE
Qty: 0 | Refills: 0 | Status: COMPLETED | OUTPATIENT
Start: 2019-04-04 | End: 2019-04-04

## 2019-04-04 RX ORDER — SODIUM CHLORIDE 9 MG/ML
1000 INJECTION, SOLUTION INTRAVENOUS
Qty: 0 | Refills: 0 | Status: DISCONTINUED | OUTPATIENT
Start: 2019-04-04 | End: 2019-04-07

## 2019-04-04 RX ORDER — PANTOPRAZOLE SODIUM 20 MG/1
40 TABLET, DELAYED RELEASE ORAL EVERY 12 HOURS
Qty: 0 | Refills: 0 | Status: DISCONTINUED | OUTPATIENT
Start: 2019-04-04 | End: 2019-04-08

## 2019-04-04 RX ORDER — ENOXAPARIN SODIUM 100 MG/ML
40 INJECTION SUBCUTANEOUS EVERY 24 HOURS
Qty: 0 | Refills: 0 | Status: DISCONTINUED | OUTPATIENT
Start: 2019-04-04 | End: 2019-04-04

## 2019-04-04 RX ADMIN — GABAPENTIN 200 MILLIGRAM(S): 400 CAPSULE ORAL at 23:18

## 2019-04-04 RX ADMIN — GABAPENTIN 200 MILLIGRAM(S): 400 CAPSULE ORAL at 12:12

## 2019-04-04 RX ADMIN — LACTULOSE 10 GRAM(S): 10 SOLUTION ORAL at 18:39

## 2019-04-04 RX ADMIN — ONDANSETRON 4 MILLIGRAM(S): 8 TABLET, FILM COATED ORAL at 12:15

## 2019-04-04 RX ADMIN — Medication 10 MILLIEQUIVALENT(S): at 12:12

## 2019-04-04 RX ADMIN — GABAPENTIN 200 MILLIGRAM(S): 400 CAPSULE ORAL at 00:43

## 2019-04-04 RX ADMIN — URSODIOL 300 MILLIGRAM(S): 250 TABLET, FILM COATED ORAL at 22:34

## 2019-04-04 RX ADMIN — Medication 2000 UNIT(S): at 12:12

## 2019-04-04 RX ADMIN — SODIUM CHLORIDE 75 MILLILITER(S): 9 INJECTION, SOLUTION INTRAVENOUS at 18:10

## 2019-04-04 RX ADMIN — ONDANSETRON 4 MILLIGRAM(S): 8 TABLET, FILM COATED ORAL at 00:43

## 2019-04-04 RX ADMIN — Medication 4: at 17:43

## 2019-04-04 RX ADMIN — PANTOPRAZOLE SODIUM 40 MILLIGRAM(S): 20 TABLET, DELAYED RELEASE ORAL at 17:50

## 2019-04-04 RX ADMIN — Medication 1 GRAM(S): at 05:24

## 2019-04-04 RX ADMIN — LACTULOSE 10 GRAM(S): 10 SOLUTION ORAL at 05:24

## 2019-04-04 RX ADMIN — GABAPENTIN 200 MILLIGRAM(S): 400 CAPSULE ORAL at 05:24

## 2019-04-04 RX ADMIN — PANTOPRAZOLE SODIUM 40 MILLIGRAM(S): 20 TABLET, DELAYED RELEASE ORAL at 05:23

## 2019-04-04 RX ADMIN — LACTULOSE 10 GRAM(S): 10 SOLUTION ORAL at 00:43

## 2019-04-04 RX ADMIN — URSODIOL 300 MILLIGRAM(S): 250 TABLET, FILM COATED ORAL at 06:01

## 2019-04-04 RX ADMIN — Medication 10 MILLIGRAM(S): at 18:07

## 2019-04-04 RX ADMIN — ONDANSETRON 4 MILLIGRAM(S): 8 TABLET, FILM COATED ORAL at 06:01

## 2019-04-04 RX ADMIN — GABAPENTIN 200 MILLIGRAM(S): 400 CAPSULE ORAL at 18:38

## 2019-04-04 RX ADMIN — ONDANSETRON 4 MILLIGRAM(S): 8 TABLET, FILM COATED ORAL at 22:49

## 2019-04-04 RX ADMIN — ENOXAPARIN SODIUM 40 MILLIGRAM(S): 100 INJECTION SUBCUTANEOUS at 12:12

## 2019-04-04 RX ADMIN — Medication 1 GRAM(S): at 23:18

## 2019-04-04 RX ADMIN — Medication 1 GRAM(S): at 00:43

## 2019-04-04 RX ADMIN — Medication 1 GRAM(S): at 12:12

## 2019-04-04 RX ADMIN — Medication 4: at 12:13

## 2019-04-04 RX ADMIN — LACTULOSE 10 GRAM(S): 10 SOLUTION ORAL at 23:18

## 2019-04-04 RX ADMIN — Medication 1 GRAM(S): at 18:39

## 2019-04-04 RX ADMIN — INSULIN GLARGINE 35 UNIT(S): 100 INJECTION, SOLUTION SUBCUTANEOUS at 22:34

## 2019-04-04 NOTE — PROGRESS NOTE ADULT - PROBLEM SELECTOR PLAN 4
-hyperglycemic on admission, patient appears to be noncompliant with medications  -continue moderate dose ISS  -increase lantus to 35 units qhs  -Endocrine Consult: Dr. Perlman  -[facility] -hyperglycemic on admission, patient appears to be noncompliant with medications  -continue moderate dose ISS  -increase lantus to 35 units qhs  -Endocrine Consult: Dr. Perlman  -[facility]  -hypoglycemic protocol   - f/u hgA1c

## 2019-04-04 NOTE — PROGRESS NOTE ADULT - PROBLEM SELECTOR PLAN 6
-suspect JEAN, no ascites on CT  -continue home med lactulose, ursodiol, rifaximin   -GI Consult: Dr. Franks  -ammonia mildly elevated 66 this AM, patient AOx3, monitor stool output -continue home Lasix with hold parameters

## 2019-04-04 NOTE — CONSULT NOTE ADULT - SUBJECTIVE AND OBJECTIVE BOX
Patient is a 63y old  Male who presents with a chief complaint of n/v, abdominal pain (03 Apr 2019 11:28)      Reason For Consult:     HPI:  Patient is a 61 y/o M with PMHx of Type 2 DM on insulin, HTN, HLD, obesity, nephrolithiasis, neuropathy, Hepatic encephalopathy, NAFLD, cirrhosis, Hyperammonemia,  Gastritis/ esophagitis, recent admission to [facility] on 12/2018 for syncope, UGIB s/p EGD, duodenal ulcer, s/p clips and cauterization, and e. coli bacteremia, presented to ED after 2 days of abdominal pain with nausea and vomiting. Reported multiple episodes of brown colored emesis. Patient also reported recent BMs that are dark in color, no diarrhea. Patient stated that he f/u with GI outpatient on Monday without any significant concerns. Patient also reported his son at home is also sick at home recently with similar symptoms of nausea, vomiting, and abdominal pain. Patient denies any headache, dizziness, visual changes, chest pain, sob, gamez, diarrhea, dysuria, or hematuria.  As per pt he has NOT taken his insulin on Tuesday as he was vomiting,    In the ED, initially VSS, most recent vs showed tachycardia 113bpm, and 150/78. Patient on room air  WBC wnl, h/h 12.6/36/8, plt 125  Initial lactate 7.2 --> 4.8 s/p 2 IVF bolus  Na 141, K 4.5, bicarb 23, anion gap serum 14, BUN 37, Cr 1.3, Glucose 399  albumin 2.4, Tbili 5, alk phos 273, Ast 42, GFR 58  lipase 52, amylase 26, serum acetone negative  UA; significant for RBC 25-50, WBC 6-10, protein 25, small ketones, moderate blood, granular cast, 1000 glucose  CT abdomen and pelvis w/ PO and IV contrast: Cirrhosis and portal hypertension. Mild, nonspecific haziness at the root of the small bowel mesentery; correlate clinically for pancreatitis. Pyloric and duodenal bulb thickening, recommend further clinical correlation for peptic ulcer disease.   EKG showed sinus tachy  Received 2x IVF bolus and 1x Zofran in ED. (03 Apr 2019 11:28)      PAST MEDICAL & SURGICAL HISTORY:  GIB (gastrointestinal bleeding)  GERD with esophagitis: Gastritis &amp; Non Bleeding Ulcers  Hepatic encephalopathy  Obesity  Fatty liver disease, nonalcoholic  Renal stones: 25 years ago  Hypertension  Neuropathy  Hypercholesteremia  Diabetes  S/P cholecystectomy      FAMILY HISTORY:  Family history of type 2 diabetes mellitus  Family history of hypertension  Family history of stomach cancer        Social History:    MEDICATIONS  (STANDING):  cholecalciferol 2000 Unit(s) Oral daily  dextrose 5%. 1000 milliLiter(s) (50 mL/Hr) IV Continuous <Continuous>  dextrose 50% Injectable 12.5 Gram(s) IV Push once  dextrose 50% Injectable 25 Gram(s) IV Push once  dextrose 50% Injectable 25 Gram(s) IV Push once  gabapentin 200 milliGRAM(s) Oral four times a day  insulin lispro (HumaLOG) corrective regimen sliding scale   SubCutaneous three times a day before meals  insulin lispro (HumaLOG) corrective regimen sliding scale   SubCutaneous at bedtime  lactulose Syrup 10 Gram(s) Oral four times a day  pantoprazole    Tablet 40 milliGRAM(s) Oral two times a day  potassium chloride    Tablet ER 10 milliEquivalent(s) Oral daily  rifaximin 550 milliGRAM(s) Oral two times a day  sodium chloride 0.45%. 1000 milliLiter(s) (75 mL/Hr) IV Continuous <Continuous>  sucralfate 1 Gram(s) Oral every 6 hours  ursodiol Capsule 300 milliGRAM(s) Oral three times a day    MEDICATIONS  (PRN):  aluminum hydroxide/magnesium hydroxide/simethicone Suspension 30 milliLiter(s) Oral every 4 hours PRN Dyspepsia  dextrose 40% Gel 15 Gram(s) Oral once PRN Blood Glucose LESS THAN 70 milliGRAM(s)/deciliter  glucagon  Injectable 1 milliGRAM(s) IntraMuscular once PRN Glucose LESS THAN 70 milligrams/deciliter  ondansetron Injectable 4 milliGRAM(s) IV Push every 6 hours PRN Nausea and/or Vomiting        T(C): 36.9 (04-04-19 @ 04:52), Max: 37 (04-03-19 @ 10:51)  HR: 100 (04-04-19 @ 04:52) (100 - 113)  BP: 148/78 (04-04-19 @ 04:52) (134/77 - 151/73)  RR: 17 (04-04-19 @ 04:52) (17 - 18)  SpO2: 99% (04-04-19 @ 04:52) (96% - 99%)  Wt(kg): --    PHYSICAL EXAM:  GENERAL: NAD, well-groomed, well-developed  HEAD:  Atraumatic, Normocephalic  NECK: Supple, No JVD, Normal thyroid  CHEST/LUNG: Clear to percussion bilaterally; No rales, rhonchi, wheezing, or rubs  HEART: Regular rate and rhythm; No murmurs, rubs, or gallops  ABDOMEN: Soft, Nontender, Nondistended; Bowel sounds present  EXTREMITIES:  2+ Peripheral Pulses, No clubbing, cyanosis, or edema  SKIN: No rashes or lesions    CAPILLARY BLOOD GLUCOSE      POCT Blood Glucose.: 237 mg/dL (04 Apr 2019 07:56)  POCT Blood Glucose.: 249 mg/dL (03 Apr 2019 21:26)  POCT Blood Glucose.: 307 mg/dL (03 Apr 2019 16:01)  POCT Blood Glucose.: 347 mg/dL (03 Apr 2019 13:46)                            10.4   7.15  )-----------( 79       ( 04 Apr 2019 09:09 )             31.2       CMP:  04-03 @ 06:54  SGPT 29  Albumin 2.4   Alk Phos 273   Anion Gap 14   SGOT 42   Total Bili 5.0   BUN 37   Calcium Total 9.8   CO2 23   Chloride 104   Creatinine 1.30   eGFR if AA 67   eGFR if non AA 58   Glucose 399   Potassium 4.5   Protein 6.4   Sodium 141      Thyroid Function Tests:      Diabetes Tests:       Radiology:
Chief Complaint/HPI  Patient is a 63y old  Male who presents with a chief complaint of severe abdominal pain and nausea and vomiting for 1 day.    GERD with esophagitis  Hepatic encephalopathy  Obesity  Fatty liver disease, nonalcoholic  Renal stones  Hypertension  Neuropathy  Hypercholesteremia  Diabetes  S/P cholecystectomy  No significant past surgical history        codeine (Anaphylaxis)            FAMILY HISTORY:  Family history of type 2 diabetes mellitus  Family history of hypertension  Family history of stomach cancer        Review of Systems:    General:  No wt loss, fevers, chills, night sweats,fatigue,   Eyes:  Good vision, no reported pain  ENT:  No sore throat, pain, runny nose, dysphagia  CV:  No pain, palpitatioins, hypo/hypertension  Resp:  No dyspnea, cough, tachypnea, wheezing  :  No pain, bleeding, incontinence, nocturia  Muscle:  No pain, weakness  Neuro:  No weakness, tingling, memory problems  Psych:  No fatigue, insomnia, mood problems, depression  Endocrine:  No polyuria, polydypsia, cold/heat intolerance  Heme:  No petechiae, ecchymosis, easy bruisability  Skin:  No rash, tattoos, scars, edema    Relevant Family History:       Relevant Social History:       Physical Exam:    Vital Signs:  Vital Signs Last 24 Hrs  T(C): 37 (2019 10:51), Max: 37.1 (2019 06:11)  T(F): 98.6 (2019 10:51), Max: 98.7 (2019 06:11)  HR: 113 (2019 10:51) (80 - 113)  BP: 150/78 (2019 10:51) (112/70 - 150/78)  BP(mean): --  RR: 18 (2019 10:51) (14 - 18)  SpO2: 96% (2019 10:51) (96% - 99%)  Daily Height in cm: 182.88 (2019 06:11)    Daily     General:  Appears stated age, well-groomed, well-nourished, no distress  HEENT:  NC/AT,  conjunctivae clear and pink, no thyromegaly, nodules, adenopathy, no JVD  Chest:  Full & symmetric excursion, no increased effort, breath sounds clear  Cardiovascular:  Regular rhythm, S1, S2, no murmur/rub/S3/S4, no abdominal bruit, no edema  Abdomen:  Soft, non-tender, non-distended, normoactive bowel sounds,  no masses ,no hepatosplenomeagaly, no signs of chronic liver disease  Extremities:  no cyanosis,clubbing or edema  Skin:  No rash/erythema/ecchymoses/petechiae/wounds/abscess/warm/dry  Neuro/Psych:  Alert, oriented, no asterixis, no tremor, no encephalopathy    Laboratory:                            12.6   8.53  )-----------( 125      ( 2019 06:54 )             36.8         141  |  104  |  37<H>  ----------------------------<  399<H>  4.5   |  23  |  1.30    Ca    9.8      2019 06:54    TPro  6.4  /  Alb  2.4<L>  /  TBili  5.0<H>  /  DBili  x   /  AST  42<H>  /  ALT  29  /  AlkPhos  273<H>  -03    LIVER FUNCTIONS - ( 2019 06:54 )  Alb: 2.4 g/dL / Pro: 6.4 g/dL / ALK PHOS: 273 U/L / ALT: 29 U/L / AST: 42 U/L / GGT: x             Urinalysis Basic - ( 2019 09:51 )    Color: Yellow / Appearance: Slightly Turbid / S.015 / pH: x  Gluc: x / Ketone: Small  / Bili: Negative / Urobili: Negative   Blood: x / Protein: 25 mg/dL / Nitrite: Negative   Leuk Esterase: Negative / RBC: 25-50 /HPF / WBC 6-10   Sq Epi: x / Non Sq Epi: Few / Bacteria: Occasional      Amylase Serum26      Lipase serum52       Ammonia--    Imaging:

## 2019-04-04 NOTE — PROGRESS NOTE ADULT - ASSESSMENT
Patient is a 63 y/o M with PMHx of Type 2 DM on insulin, HTN, HLD, obesity, nephrolithiasis, neuropathy, Hepatic encephalopathy, NAFLD, cirrhosis, Hyperammonemia,  Gastritis/ esophagitis, recent admission to [facility] on 12/2018 for syncope, UGIB s/p EGD, duodenal ulcer, s/p clips and cauterization, and e. coli bacteremia, presented to ED after 2 days of abdominal pain with nausea and vomiting, admit for evaluation of gastroenteritis and r/o DKA.

## 2019-04-04 NOTE — PROGRESS NOTE ADULT - SUBJECTIVE AND OBJECTIVE BOX
INTERVAL HPI/OVERNIGHT EVENTS:  pt seen and examined  c/o indigestion and nausea  denies vomiting, abd pain and diarrhea  last bm this am, brown, formed  afebrile overnight labs noted    MEDICATIONS  (STANDING):  cholecalciferol 2000 Unit(s) Oral daily  dextrose 5%. 1000 milliLiter(s) (50 mL/Hr) IV Continuous <Continuous>  dextrose 50% Injectable 12.5 Gram(s) IV Push once  dextrose 50% Injectable 25 Gram(s) IV Push once  dextrose 50% Injectable 25 Gram(s) IV Push once  enoxaparin Injectable 40 milliGRAM(s) SubCutaneous every 24 hours  gabapentin 200 milliGRAM(s) Oral four times a day  insulin glargine Injectable (LANTUS) 35 Unit(s) SubCutaneous at bedtime  insulin lispro (HumaLOG) corrective regimen sliding scale   SubCutaneous three times a day before meals  insulin lispro (HumaLOG) corrective regimen sliding scale   SubCutaneous at bedtime  lactulose Syrup 10 Gram(s) Oral four times a day  pantoprazole    Tablet 40 milliGRAM(s) Oral two times a day  potassium chloride    Tablet ER 10 milliEquivalent(s) Oral daily  rifaximin 550 milliGRAM(s) Oral two times a day  sodium chloride 0.45%. 1000 milliLiter(s) (75 mL/Hr) IV Continuous <Continuous>  sucralfate 1 Gram(s) Oral every 6 hours  ursodiol Capsule 300 milliGRAM(s) Oral three times a day    MEDICATIONS  (PRN):  aluminum hydroxide/magnesium hydroxide/simethicone Suspension 30 milliLiter(s) Oral every 4 hours PRN Dyspepsia  dextrose 40% Gel 15 Gram(s) Oral once PRN Blood Glucose LESS THAN 70 milliGRAM(s)/deciliter  glucagon  Injectable 1 milliGRAM(s) IntraMuscular once PRN Glucose LESS THAN 70 milligrams/deciliter  ondansetron Injectable 4 milliGRAM(s) IV Push every 6 hours PRN Nausea and/or Vomiting      Allergies    codeine (Anaphylaxis)    Intolerances        Review of Systems:    General:  No wt loss, fevers, chills, night sweats, fatigue   Eyes:  Good vision, no reported pain  ENT:  No sore throat, pain, runny nose, dysphagia  CV:  No pain, palpitations, hypo/hypertension  Resp:  No dyspnea, cough, tachypnea, wheezing  GI:  No pain, +nausea, No vomiting, No diarrhea, No constipation, No weight loss, No fever, No pruritis, No rectal bleeding, No melena, No dysphagia  :  No pain, bleeding, incontinence, nocturia  Muscle:  No pain, weakness  Neuro:  No weakness, tingling, memory problems  Psych:  No fatigue, insomnia, mood problems, depression  Endocrine:  No polyuria, polydypsia, cold/heat intolerance  Heme:  No petechiae, ecchymosis, easy bruisability  Skin:  No rash, tattoos, scars, edema      Vital Signs Last 24 Hrs  T(C): 36.9 (2019 04:52), Max: 37 (2019 10:51)  T(F): 98.5 (2019 04:52), Max: 98.6 (2019 10:51)  HR: 100 (2019 04:52) (100 - 113)  BP: 148/78 (2019 04:52) (134/77 - 151/73)  BP(mean): --  RR: 17 (2019 04:52) (17 - 18)  SpO2: 99% (2019 04:52) (96% - 99%)    PHYSICAL EXAM:    Constitutional: NAD  HEENT: ncat  Neck: No LAD  Respiratory: dec bs  Cardiovascular: S1 and S2, RRR  Gastrointestinal: obese soft nt mild dt  Extremities: No peripheral edema  Vascular: 2+ peripheral pulses  Neurological: Awake alert responds appropriately  Skin: jaundice      LABS:                        10.4   7.15  )-----------( 79       ( 2019 09:09 )             31.2         142  |  106  |  31<H>  ----------------------------<  256<H>  3.5   |  27  |  0.93    Ca    8.2<L>      2019 09:09    TPro  5.6<L>  /  Alb  2.3<L>  /  TBili  4.5<H>  /  DBili  x   /  AST  33  /  ALT  23  /  AlkPhos  174<H>      PT/INR - ( 2019 09:09 )   PT: 17.9 sec;   INR: 1.55 ratio         PTT - ( 2019 09:09 )  PTT:31.4 sec  Urinalysis Basic - ( 2019 09:51 )    Color: Yellow / Appearance: Slightly Turbid / S.015 / pH: x  Gluc: x / Ketone: Small  / Bili: Negative / Urobili: Negative   Blood: x / Protein: 25 mg/dL / Nitrite: Negative   Leuk Esterase: Negative / RBC: 25-50 /HPF / WBC 6-10   Sq Epi: x / Non Sq Epi: Few / Bacteria: Occasional        RADIOLOGY & ADDITIONAL TESTS:

## 2019-04-04 NOTE — PROGRESS NOTE ADULT - SUBJECTIVE AND OBJECTIVE BOX
Patient is a 63y old  Male who presents with a chief complaint of n/v, abdominal pain (2019 10:41)      INTERVAL HPI:  Patient is a 61 y/o M with PMHx of Type 2 DM on insulin, HTN, HLD, obesity, nephrolithiasis, neuropathy, Hepatic encephalopathy, NAFLD, cirrhosis, Hyperammonemia,  Gastritis/ esophagitis, recent admission to [facility] on 2018 for syncope, UGIB s/p EGD, duodenal ulcer, s/p clips and cauterization, and e. coli bacteremia, presented to ED after 2 days of abdominal pain with nausea and vomiting. Reported multiple episodes of brown colored emesis. Patient also reported recent BMs that are dark in color, no diarrhea. Patient stated that he f/u with GI outpatient on Monday without any significant concerns. Patient also reported his son at home is also sick at home recently with similar symptoms of nausea, vomiting, and abdominal pain. Patient denies any headache, dizziness, visual changes, chest pain, sob, gamez, diarrhea, dysuria, or hematuria.  As per pt he has NOT taken his insulin on Tuesday as he was vomiting,    In the ED, initially VSS, most recent vs showed tachycardia 113bpm, and 150/78. Patient on room air  WBC wnl, h/h 12.6/36/8, plt 125  Initial lactate 7.2 --> 4.8 s/p 2 IVF bolus  Na 141, K 4.5, bicarb 23, anion gap serum 14, BUN 37, Cr 1.3, Glucose 399  albumin 2.4, Tbili 5, alk phos 273, Ast 42, GFR 58  lipase 52, amylase 26, serum acetone negative  UA; significant for RBC 25-50, WBC 6-10, protein 25, small ketones, moderate blood, granular cast, 1000 glucose  CT abdomen and pelvis w/ PO and IV contrast: Cirrhosis and portal hypertension. Mild, nonspecific haziness at the root of the small bowel mesentery; correlate clinically for pancreatitis. Pyloric and duodenal bulb thickening, recommend further clinical correlation for peptic ulcer disease.   EKG showed sinus tachy  Received 2x IVF bolus and 1x Zofran in ED.     OVERNIGHT EVENTS: Pt seen, examined, no acute events overnight, patient complains of GERD like sxs.     Home Medications:  aluminum hydroxide-magnesium hydroxide 200 mg-200 mg/5 mL oral suspension: 30 milliliter(s) orally 2 times a day (2019 11:43)  furosemide 20 mg oral tablet: 2 tab(s) orally once a day (2019 11:43)  insulin glargine: 50 unit(s) subcutaneous once a day (at bedtime) pt has pen (2019 11:43)  lactulose 10 g/15 mL oral syrup: 15 milliliter(s) orally 4 times a day (2019 11:43)  pantoprazole 40 mg oral delayed release tablet: 1 tab(s) orally once a day (2019 11:43)  phytonadione 100 mcg oral tablet: 1 tab(s) orally once a day (2019 11:43)  potassium chloride 10 mEq oral tablet, extended release: 1 tab(s) orally once a day (2019 11:43)  pravastatin 40 mg oral tablet: 1 tab(s) orally once a day (2019 11:43)  rifAXIMin 550 mg oral tablet: 1 tab(s) orally 2 times a day (2019 11:43)  Vitamin D3 2000 intl units oral tablet: 1 tab(s) orally once a day (2019 11:43)      MEDICATIONS  (STANDING):  cholecalciferol 2000 Unit(s) Oral daily  dextrose 5%. 1000 milliLiter(s) (50 mL/Hr) IV Continuous <Continuous>  dextrose 50% Injectable 12.5 Gram(s) IV Push once  dextrose 50% Injectable 25 Gram(s) IV Push once  dextrose 50% Injectable 25 Gram(s) IV Push once  enoxaparin Injectable 40 milliGRAM(s) SubCutaneous every 24 hours  gabapentin 200 milliGRAM(s) Oral four times a day  insulin glargine Injectable (LANTUS) 35 Unit(s) SubCutaneous at bedtime  insulin lispro (HumaLOG) corrective regimen sliding scale   SubCutaneous three times a day before meals  insulin lispro (HumaLOG) corrective regimen sliding scale   SubCutaneous at bedtime  lactulose Syrup 10 Gram(s) Oral four times a day  pantoprazole    Tablet 40 milliGRAM(s) Oral two times a day  potassium chloride    Tablet ER 10 milliEquivalent(s) Oral daily  rifaximin 550 milliGRAM(s) Oral two times a day  sodium chloride 0.45%. 1000 milliLiter(s) (75 mL/Hr) IV Continuous <Continuous>  sucralfate 1 Gram(s) Oral every 6 hours  ursodiol Capsule 300 milliGRAM(s) Oral three times a day    MEDICATIONS  (PRN):  aluminum hydroxide/magnesium hydroxide/simethicone Suspension 30 milliLiter(s) Oral every 4 hours PRN Dyspepsia  dextrose 40% Gel 15 Gram(s) Oral once PRN Blood Glucose LESS THAN 70 milliGRAM(s)/deciliter  glucagon  Injectable 1 milliGRAM(s) IntraMuscular once PRN Glucose LESS THAN 70 milligrams/deciliter  ondansetron Injectable 4 milliGRAM(s) IV Push every 6 hours PRN Nausea and/or Vomiting      Allergies    codeine (Anaphylaxis)    Intolerances        REVIEW OF SYSTEMS:  CONSTITUTIONAL: No fever, No chills, No fatigue, No myalgia, No Body ache, No Weakness  EYES: No eye pain,  No visual disturbances, No discharge, No Redness  ENMT:  No ear pain, No nose bleed, No vertigo; No sinus or throat pain, No Congestion  NECK: No pain, No stiffness  RESPIRATORY: No cough, wheezing, No  hemoptysis, No shortness of breath  CARDIOVASCULAR: No chest pain, palpitations  GASTROINTESTINAL: No abdominal or epigastric pain. No nausea, No vomiting; No diarrhea or constipation. [  ] BM  GENITOURINARY: No dysuria, No frequency, No urgency, No hematuria, or incontinence  NEUROLOGICAL: No headaches, No dizziness, No numbness, No tingling, No tremors, No weakness  EXT: No Swelling, No Pain, No Edema  SKIN:  [  ] No itching, burning, rashes, or lesions   MUSCULOSKELETAL: No joint pain or swelling; No muscle pain, No back pain, No extremity pain  PSYCHIATRIC: No depression, anxiety, mood swings or difficulty sleeping at night  PAIN SCALE: [  ] None  [  ] Other-  ROS Unable to obtain due to - [  ] Dementia  [  ] Lethargy  [  ] Sedated [  ] non verbal  REST OF REVIEW Of SYSTEM - [  ] Normal     Vital Signs Last 24 Hrs  T(C): 36.9 (2019 04:52), Max: 37 (2019 16:05)  T(F): 98.5 (2019 04:52), Max: 98.6 (2019 16:05)  HR: 100 (2019 04:52) (100 - 104)  BP: 148/78 (2019 04:52) (134/77 - 151/73)  BP(mean): --  RR: 17 (2019 04:52) (17 - 18)  SpO2: 99% (2019 04:52) (97% - 99%)  Finger Stick        -03 @ 07:01  -  04-04 @ 07:00  --------------------------------------------------------  IN: 1300 mL / OUT: 0 mL / NET: 1300 mL        PHYSICAL EXAM:  GENERAL:  [  ] NAD , [  ] well appearing, [  ] Agitated, [  ] Drowsy,  [  ] Lethargy, [  ] confused   HEAD:  [  ] Normal, [  ] Other  EYES:  [  ] EOMI, [  ] PERRLA, [  ] conjunctiva and sclera clear normal, [  ] Other,  [  ] Pallor,[  ] Discharge  ENMT:  [  ] Normal, [  ] Moist mucous membranes, [  ] Good dentition, [  ] No Thrush  NECK:  [  ] Supple, [  ] No JVD, [  ] Normal thyroid, [  ] Lymphadenopathy [  ] Other  CHEST/LUNG:  [  ] Clear to auscultation bilaterally, [  ] Breath Sounds equal OR Decrease,  [  ] No rales, [  ] No rhonchi  [  ]  No wheezing  HEART:  [  ] Regular rate and rhythm, [  ] tachycardia, [  ] Bradycardia,  [  ] irregular  [  ] No murmurs, No rubs, No gallops, [  ] PPM in place (Mfr:  )  ABDOMEN:  [  ] Soft, [  ] Nontender, [  ] Nondistended, [  ] No mass, [  ] Bowel sounds present, [  ] obese  NERVOUS SYSTEM:  [  ] Alert & Oriented X3, [  ] Nonfocal  [  ] Confusion  [  ] Encephalopathic [  ] Sedated [  ] Unable to assess, [  ] Other-  EXTREMITIES: [  ] 2+ Peripheral Pulses, No clubbing, No cyanosis,  [  ] edema B/L lower EXT. [  ] PVD stasis skin changes B/L Lower EXT  LYMPH: No lymphadenopathy noted  SKIN:  [  ] No rashes or lesions, [  ] Pressure Ulcers, [  ] ecchymosis, [  ] Skin Tears, [  ] Other    DIET:     LABS:                        10.4   7.15  )-----------( 79       ( 2019 09:09 )             31.2     2019 09:09    142    |  106    |  31     ----------------------------<  256    3.5     |  27     |  0.93     Ca    8.2        2019 09:09    TPro  5.6    /  Alb  2.3    /  TBili  4.5    /  DBili  x      /  AST  33     /  ALT  23     /  AlkPhos  174    2019 09:09    PT/INR - ( 2019 09:09 )   PT: 17.9 sec;   INR: 1.55 ratio         PTT - ( 2019 09:09 )  PTT:31.4 sec  Urinalysis Basic - ( 2019 09:51 )    Color: Yellow / Appearance: Slightly Turbid / S.015 / pH: x  Gluc: x / Ketone: Small  / Bili: Negative / Urobili: Negative   Blood: x / Protein: 25 mg/dL / Nitrite: Negative   Leuk Esterase: Negative / RBC: 25-50 /HPF / WBC 6-10   Sq Epi: x / Non Sq Epi: Few / Bacteria: Occasional                           Anemia Panel:      Thyroid Panel:        Lipase, Serum: 52 U/L (19 @ 06:54)  Amylase, Serum Total: 26 U/L (19 @ 06:54)          RADIOLOGY & ADDITIONAL TESTS:  New imaging reviewed    HEALTH ISSUES - PROBLEM Dx:  Anemia: Anemia  Diabetes mellitus type 2, uncontrolled: Diabetes mellitus type 2, uncontrolled  Need for prophylactic measure: Need for prophylactic measure  Hypercholesteremia: Hypercholesteremia  Hypertension: Hypertension  Cirrhosis: Cirrhosis  GIB (gastrointestinal bleeding): GIB (gastrointestinal bleeding)  Diabetes: Diabetes  Elevated glucose: Elevated glucose  Lactic acidosis: Lactic acidosis  Hepatic encephalopathy: Hepatic encephalopathy  Abdominal pain: Abdominal pain  Gastroenteritis: Gastroenteritis          Consultant(s) Notes Reviewed:  [  ] YES     Care Discussed with [X] Consultants  [  ] Patient  [  ] Family  [  ]   [  ] Social Service  [  ] RN, [  ] Physical Therapy  DVT PPX: [  ] Lovenox, [  ] S C Heparin, [  ] Coumadin, [  ] Xarelto, [  ] Eliquis, [  ] Pradaxa, [  ] IV Heparin drip, [  ] SCD [  ] Contraindication 2 to GI Bleed,[  ] Ambulation  Advanced directive: [  ] None, [  ] DNR/DNI Patient is a 63y old  Male who presents with a chief complaint of n/v, abdominal pain (2019 10:41)      INTERVAL HPI:  Patient is a 61 y/o M with PMHx of Type 2 DM on insulin, HTN, HLD, obesity, nephrolithiasis, neuropathy, Hepatic encephalopathy, NAFLD, cirrhosis, Hyperammonemia,  Gastritis/ esophagitis, recent admission to [facility]"" on 2018 for syncope, UGIB s/p EGD, duodenal ulcer, s/p clips and cauterization, and e. coli bacteremia, presented to ED after 2 days of abdominal pain with nausea and vomiting. Reported multiple episodes of brown colored emesis. Patient also reported recent BMs that are dark in color, no diarrhea. Patient stated that he f/u with GI outpatient on Monday without any significant concerns. Patient also reported his son at home is also sick at home recently with similar symptoms of nausea, vomiting, and abdominal pain. Patient denies any headache, dizziness, visual changes, chest pain, sob, gamez, diarrhea, dysuria, or hematuria.  As per pt he has NOT taken his insulin on Tuesday as he was vomiting,    In the ED, initially VSS, most recent vs showed tachycardia 113bpm, and 150/78. Patient on room air  WBC wnl, h/h 12.6/36/8, plt 125  Initial lactate 7.2 --> 4.8 s/p 2 IVF bolus  Na 141, K 4.5, bicarb 23, anion gap serum 14, BUN 37, Cr 1.3, Glucose 399  albumin 2.4, Tbili 5, alk phos 273, Ast 42, GFR 58  lipase 52, amylase 26, serum acetone negative  UA; significant for RBC 25-50, WBC 6-10, protein 25, small ketones, moderate blood, granular cast, 1000 glucose  CT abdomen and pelvis w/ PO and IV contrast: Cirrhosis and portal hypertension. Mild, nonspecific haziness at the root of the small bowel mesentery; correlate clinically for pancreatitis. Pyloric and duodenal bulb thickening, recommend further clinical correlation for peptic ulcer disease.   EKG showed sinus tachy  Received 2x IVF bolus and 1x Zofran in ED.     OVERNIGHT EVENTS: Pt seen, examined, no acute events overnight, patient complains of GERD like sxs.     Home Medications:  aluminum hydroxide-magnesium hydroxide 200 mg-200 mg/5 mL oral suspension: 30 milliliter(s) orally 2 times a day (2019 11:43)  furosemide 20 mg oral tablet: 2 tab(s) orally once a day (2019 11:43)  insulin glargine: 50 unit(s) subcutaneous once a day (at bedtime) pt has pen (2019 11:43)  lactulose 10 g/15 mL oral syrup: 15 milliliter(s) orally 4 times a day (2019 11:43)  pantoprazole 40 mg oral delayed release tablet: 1 tab(s) orally once a day (2019 11:43)  phytonadione 100 mcg oral tablet: 1 tab(s) orally once a day (2019 11:43)  potassium chloride 10 mEq oral tablet, extended release: 1 tab(s) orally once a day (2019 11:43)  pravastatin 40 mg oral tablet: 1 tab(s) orally once a day (2019 11:43)  rifAXIMin 550 mg oral tablet: 1 tab(s) orally 2 times a day (2019 11:43)  Vitamin D3 2000 intl units oral tablet: 1 tab(s) orally once a day (2019 11:43)      MEDICATIONS  (STANDING):  cholecalciferol 2000 Unit(s) Oral daily  dextrose 5%. 1000 milliLiter(s) (50 mL/Hr) IV Continuous <Continuous>  dextrose 50% Injectable 12.5 Gram(s) IV Push once  dextrose 50% Injectable 25 Gram(s) IV Push once  dextrose 50% Injectable 25 Gram(s) IV Push once  enoxaparin Injectable 40 milliGRAM(s) SubCutaneous every 24 hours  gabapentin 200 milliGRAM(s) Oral four times a day  insulin glargine Injectable (LANTUS) 35 Unit(s) SubCutaneous at bedtime  insulin lispro (HumaLOG) corrective regimen sliding scale   SubCutaneous three times a day before meals  insulin lispro (HumaLOG) corrective regimen sliding scale   SubCutaneous at bedtime  lactulose Syrup 10 Gram(s) Oral four times a day  pantoprazole    Tablet 40 milliGRAM(s) Oral two times a day  potassium chloride    Tablet ER 10 milliEquivalent(s) Oral daily  rifaximin 550 milliGRAM(s) Oral two times a day  sodium chloride 0.45%. 1000 milliLiter(s) (75 mL/Hr) IV Continuous <Continuous>  sucralfate 1 Gram(s) Oral every 6 hours  ursodiol Capsule 300 milliGRAM(s) Oral three times a day    MEDICATIONS  (PRN):  aluminum hydroxide/magnesium hydroxide/simethicone Suspension 30 milliLiter(s) Oral every 4 hours PRN Dyspepsia  dextrose 40% Gel 15 Gram(s) Oral once PRN Blood Glucose LESS THAN 70 milliGRAM(s)/deciliter  glucagon  Injectable 1 milliGRAM(s) IntraMuscular once PRN Glucose LESS THAN 70 milligrams/deciliter  ondansetron Injectable 4 milliGRAM(s) IV Push every 6 hours PRN Nausea and/or Vomiting      Allergies    codeine (Anaphylaxis)    Intolerances        REVIEW OF SYSTEMS:  CONSTITUTIONAL: No fever, No chills, No fatigue, No myalgia, No Body ache, No Weakness  EYES: No eye pain,  No visual disturbances, No discharge, No Redness  ENMT:  No ear pain, No nose bleed, No vertigo; No sinus or throat pain, No Congestion  NECK: No pain, No stiffness  RESPIRATORY: No cough, wheezing, No  hemoptysis, No shortness of breath  CARDIOVASCULAR: No chest pain, palpitations  GASTROINTESTINAL: MILD EPIGASTRIC PAIN, NAUSEA, No vomiting; No diarrhea or constipation. [ X ] BM - black colored  GENITOURINARY: No dysuria, No frequency, No urgency, No hematuria, or incontinence  NEUROLOGICAL: No headaches, No dizziness, No numbness, No tingling, No tremors, No weakness  EXT: No Swelling, No Pain, No Edema  SKIN:  [  ] No itching, burning, rashes, or lesions   MUSCULOSKELETAL: No joint pain or swelling; No muscle pain, No back pain, No extremity pain  PSYCHIATRIC: No depression, anxiety, mood swings or difficulty sleeping at night  PAIN SCALE: [  ] None  [  ] Other-  ROS Unable to obtain due to - [  ] Dementia  [  ] Lethargy  [  ] Sedated [  ] non verbal  REST OF REVIEW Of SYSTEM - [ x ] Normal     Vital Signs Last 24 Hrs  T(C): 36.9 (2019 04:52), Max: 37 (2019 16:05)  T(F): 98.5 (2019 04:52), Max: 98.6 (2019 16:05)  HR: 100 (2019 04:52) (100 - 104)  BP: 148/78 (2019 04:52) (134/77 - 151/73)  BP(mean): --  RR: 17 (2019 04:52) (17 - 18)  SpO2: 99% (2019 04:52) (97% - 99%)  Finger Stick        -03 @ 07:01  -  04-04 @ 07:00  --------------------------------------------------------  IN: 1300 mL / OUT: 0 mL / NET: 1300 mL        PHYSICAL EXAM:  GENERAL:  [ x ] NAD , [ x ] well appearing, [  ] Agitated, [  ] Drowsy,  [  ] Lethargy, [  ] confused   HEAD:  [ x ] Normal, [  ] Other  EYES:  [ x ] EOMI, [  x] PERRLA, [  ] conjunctiva and sclera clear normal, [  ] Other,  [  ] Pallor,[  ] Discharge  ENMT:  [ x ] Normal, [x] Moist mucous membranes, [  ] Good dentition, [  ] No Thrush  NECK:  [ x ] Supple, [ x ] No JVD, [  ] Normal thyroid, [  ] Lymphadenopathy [  ] Other  CHEST/LUNG:  [ x ] Clear to auscultation bilaterally, [ x ] Breath Sounds equal  [ x ] No rales, [x  ] No rhonchi  [ x ]  No wheezing  HEART:  [ x ] Regular rate and rhythm, [  ] tachycardia, [  ] Bradycardia,  [  ] irregular  [ x ] No murmurs, No rubs, No gallops, [  ] PPM in place (Mfr:  )  ABDOMEN:  [x  ] Soft, [ x ] Nontender, [x] Nondistended, [ x ] No mass, [ x ] Bowel sounds present, [ x ] obese  NERVOUS SYSTEM:  [ x] Alert & Oriented X3, [ x ] Nonfocal  [  ] Confusion  [  ] Encephalopathic [  ] Sedated [  ] Unable to assess, [  ] Other-  EXTREMITIES: [ x ] 2+ Peripheral Pulses, No clubbing, No cyanosis,  [  ] edema B/L lower EXT. [  ] PVD stasis skin changes B/L Lower EXT  LYMPH: No lymphadenopathy noted  SKIN:  [ x ] No rashes or lesions, [  ] Pressure Ulcers, [  ] ecchymosis, [  ] Skin Tears, [  ] Other    DIET: Full Liquid, CCarb, DASH    LABS:                        10.4   7.15  )-----------( 79       ( 2019 09:09 )             31.2     2019 09:09    142    |  106    |  31     ----------------------------<  256    3.5     |  27     |  0.93     Ca    8.2        2019 09:09    TPro  5.6    /  Alb  2.3    /  TBili  4.5    /  DBili  x      /  AST  33     /  ALT  23     /  AlkPhos  174    2019 09:09    PT/INR - ( 2019 09:09 )   PT: 17.9 sec;   INR: 1.55 ratio         PTT - ( 2019 09:09 )  PTT:31.4 sec  Urinalysis Basic - ( 2019 09:51 )    Color: Yellow / Appearance: Slightly Turbid / S.015 / pH: x  Gluc: x / Ketone: Small  / Bili: Negative / Urobili: Negative   Blood: x / Protein: 25 mg/dL / Nitrite: Negative   Leuk Esterase: Negative / RBC: 25-50 /HPF / WBC 6-10   Sq Epi: x / Non Sq Epi: Few / Bacteria: Occasional        Lipase, Serum: 52 U/L (19 @ 06:54)  Amylase, Serum Total: 26 U/L (19 @ 06:54)      RADIOLOGY & ADDITIONAL TESTS:  New imaging reviewed    HEALTH ISSUES - PROBLEM Dx:  Anemia: Anemia  Diabetes mellitus type 2, uncontrolled: Diabetes mellitus type 2, uncontrolled  Need for prophylactic measure: Need for prophylactic measure  Hypercholesteremia: Hypercholesteremia  Hypertension: Hypertension  Cirrhosis: Cirrhosis  GIB (gastrointestinal bleeding): GIB (gastrointestinal bleeding)  Diabetes: Diabetes  Elevated glucose: Elevated glucose  Lactic acidosis: Lactic acidosis  Hepatic encephalopathy: Hepatic encephalopathy  Abdominal pain: Abdominal pain  Gastroenteritis: Gastroenteritis          Consultant(s) Notes Reviewed:  [ x ] YES     Care Discussed with [X] Consultants  [  ] Patient  [  ] Family  [  ]   [  ] Social Service  [  ] RN, [  ] Physical Therapy  DVT PPX: [  ] Lovenox, [  ] S C Heparin, [ x ] Coumadin, [  ] Xarelto, [  ] Eliquis, [  ] Pradaxa, [  ] IV Heparin drip, [  ] SCD [  ] Contraindication 2 to GI Bleed,[  ] Ambulation  Advanced directive: [  ] None, [  ] DNR/DNI Patient is a 63y old  Male who presents with a chief complaint of n/v, abdominal pain (2019 10:41)      INTERVAL HPI:  Patient is a 63 y/o M with PMHx of Type 2 DM on insulin, HTN, HLD, obesity, nephrolithiasis, neuropathy, Hepatic encephalopathy, NAFLD, cirrhosis, Hyperammonemia,  Gastritis/ esophagitis, recent admission to [facility] on 2018 for syncope, UGIB s/p EGD, duodenal ulcer, s/p clips and cauterization, and e. coli bacteremia, presented to ED after 2 days of abdominal pain with nausea and vomiting. Reported multiple episodes of brown colored emesis. Patient also reported recent BMs that are dark in color, no diarrhea. Patient stated that he f/u with GI outpatient on Monday without any significant concerns. Patient also reported his son at home is also sick at home recently with similar symptoms of nausea, vomiting, and abdominal pain. Patient denies any headache, dizziness, visual changes, chest pain, sob, gamez, diarrhea, dysuria, or hematuria.  As per pt he has NOT taken his insulin on Tuesday as he was vomiting,    In the ED, initially VSS, most recent vs showed tachycardia 113bpm, and 150/78. Patient on room air  WBC wnl, h/h 12.6/36/8, plt 125  Initial lactate 7.2 --> 4.8 s/p 2 IVF bolus  Na 141, K 4.5, bicarb 23, anion gap serum 14, BUN 37, Cr 1.3, Glucose 399  albumin 2.4, Tbili 5, alk phos 273, Ast 42, GFR 58  lipase 52, amylase 26, serum acetone negative  UA; significant for RBC 25-50, WBC 6-10, protein 25, small ketones, moderate blood, granular cast, 1000 glucose  CT abdomen and pelvis w/ PO and IV contrast: Cirrhosis and portal hypertension. Mild, nonspecific haziness at the root of the small bowel mesentery; correlate clinically for pancreatitis. Pyloric and duodenal bulb thickening, recommend further clinical correlation for peptic ulcer disease.   EKG showed sinus tachy  Received 2x IVF bolus and 1x Zofran in ED.    19 :Pt seen, examined, no acute events overnight, patient complains of GERD like sxs. FOBT +    OVERNIGHT EVENTS:NONE    Home Medications:  aluminum hydroxide-magnesium hydroxide 200 mg-200 mg/5 mL oral suspension: 30 milliliter(s) orally 2 times a day (2019 11:43)  furosemide 20 mg oral tablet: 2 tab(s) orally once a day (2019 11:43)  insulin glargine: 50 unit(s) subcutaneous once a day (at bedtime) pt has pen (2019 11:43)  lactulose 10 g/15 mL oral syrup: 15 milliliter(s) orally 4 times a day (2019 11:43)  pantoprazole 40 mg oral delayed release tablet: 1 tab(s) orally once a day (2019 11:43)  phytonadione 100 mcg oral tablet: 1 tab(s) orally once a day (2019 11:43)  potassium chloride 10 mEq oral tablet, extended release: 1 tab(s) orally once a day (2019 11:43)  pravastatin 40 mg oral tablet: 1 tab(s) orally once a day (2019 11:43)  rifAXIMin 550 mg oral tablet: 1 tab(s) orally 2 times a day (2019 11:43)  Vitamin D3 2000 intl units oral tablet: 1 tab(s) orally once a day (2019 11:43)      MEDICATIONS  (STANDING):  cholecalciferol 2000 Unit(s) Oral daily  dextrose 5%. 1000 milliLiter(s) (50 mL/Hr) IV Continuous <Continuous>  dextrose 50% Injectable 12.5 Gram(s) IV Push once  dextrose 50% Injectable 25 Gram(s) IV Push once  dextrose 50% Injectable 25 Gram(s) IV Push once  enoxaparin Injectable 40 milliGRAM(s) SubCutaneous every 24 hours  gabapentin 200 milliGRAM(s) Oral four times a day  insulin glargine Injectable (LANTUS) 35 Unit(s) SubCutaneous at bedtime  insulin lispro (HumaLOG) corrective regimen sliding scale   SubCutaneous three times a day before meals  insulin lispro (HumaLOG) corrective regimen sliding scale   SubCutaneous at bedtime  lactulose Syrup 10 Gram(s) Oral four times a day  pantoprazole    Tablet 40 milliGRAM(s) Oral two times a day  potassium chloride    Tablet ER 10 milliEquivalent(s) Oral daily  rifaximin 550 milliGRAM(s) Oral two times a day  sodium chloride 0.45%. 1000 milliLiter(s) (75 mL/Hr) IV Continuous <Continuous>  sucralfate 1 Gram(s) Oral every 6 hours  ursodiol Capsule 300 milliGRAM(s) Oral three times a day    MEDICATIONS  (PRN):  aluminum hydroxide/magnesium hydroxide/simethicone Suspension 30 milliLiter(s) Oral every 4 hours PRN Dyspepsia  dextrose 40% Gel 15 Gram(s) Oral once PRN Blood Glucose LESS THAN 70 milliGRAM(s)/deciliter  glucagon  Injectable 1 milliGRAM(s) IntraMuscular once PRN Glucose LESS THAN 70 milligrams/deciliter  ondansetron Injectable 4 milliGRAM(s) IV Push every 6 hours PRN Nausea and/or Vomiting      Allergies    codeine (Anaphylaxis)    Intolerances        REVIEW OF SYSTEMS: C/O burning in stomach  CONSTITUTIONAL: No fever, No chills, No fatigue, No myalgia, No Body ache, No Weakness  EYES: No eye pain,  No visual disturbances, No discharge, No Redness  ENMT:  No ear pain, No nose bleed, No vertigo; No sinus or throat pain, No Congestion  NECK: No pain, No stiffness  RESPIRATORY: No cough, wheezing, No  hemoptysis, No shortness of breath  CARDIOVASCULAR: No chest pain, palpitations  GASTROINTESTINAL: MILD EPIGASTRIC PAIN, NAUSEA, No vomiting; No diarrhea or constipation. [ X ] BM - black colored  GENITOURINARY: No dysuria, No frequency, No urgency, No hematuria, or incontinence  NEUROLOGICAL: No headaches, No dizziness, No numbness, No tingling, No tremors, No weakness  EXT: No Swelling, No Pain, No Edema  SKIN:  [  ] No itching, burning, rashes, or lesions   MUSCULOSKELETAL: No joint pain or swelling; No muscle pain, No back pain, No extremity pain  PSYCHIATRIC: No depression, anxiety, mood swings or difficulty sleeping at night  PAIN SCALE: [  ] None  [x  ] Other- stomach discomfort  ROS Unable to obtain due to - [  ] Dementia  [  ] Lethargy  [  ] Sedated [  ] non verbal  REST OF REVIEW Of SYSTEM - [ x ] Normal     Vital Signs Last 24 Hrs  T(C): 36.9 (2019 04:52), Max: 37 (2019 16:05)  T(F): 98.5 (2019 04:52), Max: 98.6 (2019 16:05)  HR: 100 (2019 04:52) (100 - 104)  BP: 148/78 (2019 04:52) (134/77 - 151/73)  BP(mean): --  RR: 17 (2019 04:52) (17 - 18)  SpO2: 99% (2019 04:52) (97% - 99%)  Finger Stick        -03 @ 07:01  -  -04 @ 07:00  --------------------------------------------------------  IN: 1300 mL / OUT: 0 mL / NET: 1300 mL        PHYSICAL EXAM:  GENERAL:  [ x ] NAD , [ x ] well appearing, [  ] Agitated, [  ] Drowsy,  [  ] Lethargy, [  ] confused   HEAD:  [ x ] Normal, [  ] Other  EYES:  [ x ] EOMI, [  x] PERRLA, [ x ] conjunctiva and sclera clear normal, [  ] Other,  [  ] Pallor,[  ] Discharge  ENMT:  [ x ] Normal, [x] Moist mucous membranes, [ x ] Good dentition, [ x ] No Thrush  NECK:  [ x ] Supple, [ x ] No JVD, [  ] Normal thyroid, [  ] Lymphadenopathy [  ] Other  CHEST/LUNG:  [ x ] Clear to auscultation bilaterally, [ x ] Breath Sounds equal  [ x ] No rales, [x  ] No rhonchi  [ x ]  No wheezing  HEART:  [ x ] Regular rate and rhythm, [  ] tachycardia, [  ] Bradycardia,  [  ] irregular  [ x ] No murmurs, No rubs, No gallops, [  ] PPM in place (Mfr:  )  ABDOMEN:  [x  ] Soft, [ x ] Nontender, [x] Nondistended, [ x ] No mass, [ x ] Bowel sounds present, [ x ] obese  NERVOUS SYSTEM:  [ x] Alert & Oriented X3, [ x ] Nonfocal  [  ] Confusion  [  ] Encephalopathic [  ] Sedated [  ] Unable to assess, [  ] Other-  EXTREMITIES: [ x ] 2+ Peripheral Pulses, No clubbing, No cyanosis,  [  ] edema B/L lower EXT. [  ] PVD stasis skin changes B/L Lower EXT  LYMPH: No lymphadenopathy noted  SKIN:  [ x ] No rashes or lesions, [  ] Pressure Ulcers, [  ] ecchymosis, [  ] Skin Tears, [  ] Other    DIET: Full Liquid, CCarb, DASH    LABS:                        10.4   7.15  )-----------( 79       ( 2019 09:09 )             31.2     2019 09:09    142    |  106    |  31     ----------------------------<  256    3.5     |  27     |  0.93     Ca    8.2        2019 09:09    TPro  5.6    /  Alb  2.3    /  TBili  4.5    /  DBili  x      /  AST  33     /  ALT  23     /  AlkPhos  174    2019 09:09    PT/INR - ( 2019 09:09 )   PT: 17.9 sec;   INR: 1.55 ratio         PTT - ( 2019 09:09 )  PTT:31.4 sec  Urinalysis Basic - ( 2019 09:51 )    Color: Yellow / Appearance: Slightly Turbid / S.015 / pH: x  Gluc: x / Ketone: Small  / Bili: Negative / Urobili: Negative   Blood: x / Protein: 25 mg/dL / Nitrite: Negative   Leuk Esterase: Negative / RBC: 25-50 /HPF / WBC 6-10   Sq Epi: x / Non Sq Epi: Few / Bacteria: Occasional        Lipase, Serum: 52 U/L (19 @ 06:54)  Amylase, Serum Total: 26 U/L (19 @ 06:54)      RADIOLOGY & ADDITIONAL TESTS: NONE  New imaging reviewed    HEALTH ISSUES - PROBLEM Dx:  Anemia: Anemia  Diabetes mellitus type 2, uncontrolled: Diabetes mellitus type 2, uncontrolled  Need for prophylactic measure: Need for prophylactic measure  Hypercholesteremia: Hypercholesteremia  Hypertension: Hypertension  Cirrhosis: Cirrhosis  GIB (gastrointestinal bleeding): GIB (gastrointestinal bleeding)  Diabetes: Diabetes  Elevated glucose: Elevated glucose  Lactic acidosis: Lactic acidosis  Hepatic encephalopathy: Hepatic encephalopathy  Abdominal pain: Abdominal pain  Gastroenteritis: Gastroenteritis          Consultant(s) Notes Reviewed:  [ x ] YES     Care Discussed with [X] Consultants  [ x ] Patient  [  ] Family  [  ]   [  ] Social Service  [ x ] RN, [  ] Physical Therapy  DVT PPX: [ x ] Lovenox, [  ] S C Heparin, [ x ] Coumadin, [  ] Xarelto, [  ] Eliquis, [  ] Pradaxa, [  ] IV Heparin drip, [  ] SCD [  ] Contraindication 2 to GI Bleed,[  ] Ambulation  Advanced directive: [ x ] None, [  ] DNR/DNI

## 2019-04-04 NOTE — PROGRESS NOTE ADULT - PROBLEM SELECTOR PLAN 5
- MISS TID, YOSELIN at bedtime, Lantus 25 bedtime   -FS Q AC HS with HISS  - hypoglycemic protocol   - consistent carb diet  - f/u hgA1c -hyperglycemic on admission, patient appears to be noncompliant with medications  -continue moderate dose ISS  -increase lantus to 35 units qhs  -Endocrine Consult: Dr. Perlman  -[facility]  -hypoglycemic protocol   - f/u hgA1c -suspect JEAN, no ascites on CT  -continue home med lactulose, ursodiol, rifaximin   -GI Consult: Dr. Franks  -ammonia mildly elevated 66 this AM, patient AOx3, monitor stool output

## 2019-04-04 NOTE — PROGRESS NOTE ADULT - PROBLEM SELECTOR PLAN 3
suspected JEAN  no ascites on CT  cont ppi, rifaximin, sandra, lactulose  trend lfts  avoid hepatotoxins  low na/pro diet when advanced  supportive care

## 2019-04-04 NOTE — PROGRESS NOTE ADULT - PROBLEM SELECTOR PLAN 9
-hold pharmacological DVT ppx for now due to possible GIB  -SCDs for DVT ppx, ambulate as tolerated with walker  -PT consult

## 2019-04-04 NOTE — PROGRESS NOTE ADULT - PROBLEM SELECTOR PLAN 5
hx of prior gib from [facility]  hgb trend noted sp ivf  no evidence of overt gib  passing brown stool  monitor cbc, transfuse prn  cont ppi  will follow

## 2019-04-04 NOTE — PROGRESS NOTE ADULT - PROBLEM SELECTOR PLAN 3
-elevated lactate at 7.2 which improved to 4.8, now 2.2  -no fever, no wbc count, does not meet SIRS or sepsis criteria at this time, no indications for abx at this time  -glucose 399, anion gap 14 on admission  -doubt DKA, ABG alkalotic, small ketones, anion gap 9 this AM  -continue IVF

## 2019-04-04 NOTE — PROGRESS NOTE ADULT - PROBLEM SELECTOR PLAN 2
improved  ct reviewed- ?pud  no signs of obstruction  cont ppi/carafate  lfts are downtrending; sp ccy, cbd stable in appearance  monitor exam and for need of further gi intervention pending clinical course

## 2019-04-04 NOTE — PROGRESS NOTE ADULT - PROBLEM SELECTOR PLAN 8
-hold home med statin for now, elevated liver enzymes  -f/u lipid panel -hold pharmacological DVT ppx for now due to possible GIB  -SCDs for DVT ppx, ambulate as tolerated with walker  -PT consult

## 2019-04-04 NOTE — CONSULT NOTE ADULT - PROBLEM SELECTOR RECOMMENDATION 9
cont lantus 25 units qhs  cont mod dose humalog scale coverage  cont cons cho diet  goal bg 100-180 in hosp setting
gentle hydration   ppi bid  liquid diet today  zofran 4 mg q6 hours atc

## 2019-04-04 NOTE — PROGRESS NOTE ADULT - PROBLEM SELECTOR PLAN 7
-continue home Lasix with hold parameters -hold home med statin for now, elevated liver enzymes  -f/u lipid panel

## 2019-04-04 NOTE — PROGRESS NOTE ADULT - PROBLEM SELECTOR PLAN 1
-hx of GIB s/p clips and cauterization, reported brown color emesis and dark color stool  -CT abdomen with PO and IV contrast showed PUD, cirrhosis, ?pancreatitis (neg amylase and lipase),  -IVF NS 75 cc/hr  -protonix, 40 mg PO BID  -Diet advanced to full liquid  -Zofran PRN for nausea  -Maalox q4 PRN dyspepsia -hx of GIB s/p clips and cauterization, reported brown color emesis and dark color stool  -CT abdomen with PO and IV contrast showed PUD, cirrhosis, ?pancreatitis (neg amylase and lipase),  -IVF NS 75 cc/hr  -protonix, 40 mg PO BID  -Diet advanced to full liquid, NPO after midnight for EGD  -Zofran PRN for nausea  -Maalox q4 PRN dyspepsia

## 2019-04-05 ENCOUNTER — RESULT REVIEW (OUTPATIENT)
Age: 64
End: 2019-04-05

## 2019-04-05 LAB
ALBUMIN SERPL ELPH-MCNC: 2.1 G/DL — LOW (ref 3.3–5)
ALP SERPL-CCNC: 136 U/L — HIGH (ref 40–120)
ALT FLD-CCNC: 24 U/L — SIGNIFICANT CHANGE UP (ref 12–78)
AMMONIA BLD-MCNC: 73 UMOL/L — HIGH (ref 11–32)
ANION GAP SERPL CALC-SCNC: 6 MMOL/L — SIGNIFICANT CHANGE UP (ref 5–17)
AST SERPL-CCNC: 27 U/L — SIGNIFICANT CHANGE UP (ref 15–37)
BASOPHILS # BLD AUTO: 0.05 K/UL — SIGNIFICANT CHANGE UP (ref 0–0.2)
BASOPHILS NFR BLD AUTO: 0.8 % — SIGNIFICANT CHANGE UP (ref 0–2)
BILIRUB SERPL-MCNC: 3.7 MG/DL — HIGH (ref 0.2–1.2)
BUN SERPL-MCNC: 22 MG/DL — SIGNIFICANT CHANGE UP (ref 7–23)
CALCIUM SERPL-MCNC: 7.9 MG/DL — LOW (ref 8.5–10.1)
CHLORIDE SERPL-SCNC: 107 MMOL/L — SIGNIFICANT CHANGE UP (ref 96–108)
CHOLEST SERPL-MCNC: 94 MG/DL — SIGNIFICANT CHANGE UP (ref 10–199)
CO2 SERPL-SCNC: 30 MMOL/L — SIGNIFICANT CHANGE UP (ref 22–31)
CREAT SERPL-MCNC: 0.99 MG/DL — SIGNIFICANT CHANGE UP (ref 0.5–1.3)
EOSINOPHIL # BLD AUTO: 0.14 K/UL — SIGNIFICANT CHANGE UP (ref 0–0.5)
EOSINOPHIL NFR BLD AUTO: 2.2 % — SIGNIFICANT CHANGE UP (ref 0–6)
GLUCOSE SERPL-MCNC: 121 MG/DL — HIGH (ref 70–99)
HCT VFR BLD CALC: 30.5 % — LOW (ref 39–50)
HDLC SERPL-MCNC: 34 MG/DL — LOW
HGB BLD-MCNC: 10.3 G/DL — LOW (ref 13–17)
IMM GRANULOCYTES NFR BLD AUTO: 0.3 % — SIGNIFICANT CHANGE UP (ref 0–1.5)
LACTATE SERPL-SCNC: 1.6 MMOL/L — SIGNIFICANT CHANGE UP (ref 0.7–2)
LIPID PNL WITH DIRECT LDL SERPL: 46 MG/DL — SIGNIFICANT CHANGE UP
LYMPHOCYTES # BLD AUTO: 2.07 K/UL — SIGNIFICANT CHANGE UP (ref 1–3.3)
LYMPHOCYTES # BLD AUTO: 33.1 % — SIGNIFICANT CHANGE UP (ref 13–44)
MCHC RBC-ENTMCNC: 32.4 PG — SIGNIFICANT CHANGE UP (ref 27–34)
MCHC RBC-ENTMCNC: 33.8 GM/DL — SIGNIFICANT CHANGE UP (ref 32–36)
MCV RBC AUTO: 95.9 FL — SIGNIFICANT CHANGE UP (ref 80–100)
MONOCYTES # BLD AUTO: 0.61 K/UL — SIGNIFICANT CHANGE UP (ref 0–0.9)
MONOCYTES NFR BLD AUTO: 9.8 % — SIGNIFICANT CHANGE UP (ref 2–14)
NEUTROPHILS # BLD AUTO: 3.36 K/UL — SIGNIFICANT CHANGE UP (ref 1.8–7.4)
NEUTROPHILS NFR BLD AUTO: 53.8 % — SIGNIFICANT CHANGE UP (ref 43–77)
NRBC # BLD: 0 /100 WBCS — SIGNIFICANT CHANGE UP (ref 0–0)
PLATELET # BLD AUTO: 81 K/UL — LOW (ref 150–400)
POTASSIUM SERPL-MCNC: 3.4 MMOL/L — LOW (ref 3.5–5.3)
POTASSIUM SERPL-SCNC: 3.4 MMOL/L — LOW (ref 3.5–5.3)
PROT SERPL-MCNC: 5.1 G/DL — LOW (ref 6–8.3)
RBC # BLD: 3.18 M/UL — LOW (ref 4.2–5.8)
RBC # FLD: 18.4 % — HIGH (ref 10.3–14.5)
SODIUM SERPL-SCNC: 143 MMOL/L — SIGNIFICANT CHANGE UP (ref 135–145)
TOTAL CHOLESTEROL/HDL RATIO MEASUREMENT: 2.8 RATIO — LOW (ref 3.4–9.6)
TRIGL SERPL-MCNC: 70 MG/DL — SIGNIFICANT CHANGE UP (ref 10–149)
WBC # BLD: 6.25 K/UL — SIGNIFICANT CHANGE UP (ref 3.8–10.5)
WBC # FLD AUTO: 6.25 K/UL — SIGNIFICANT CHANGE UP (ref 3.8–10.5)

## 2019-04-05 PROCEDURE — 74182 MRI ABDOMEN W/CONTRAST: CPT | Mod: 26

## 2019-04-05 PROCEDURE — 88305 TISSUE EXAM BY PATHOLOGIST: CPT | Mod: 26

## 2019-04-05 PROCEDURE — 88313 SPECIAL STAINS GROUP 2: CPT | Mod: 26

## 2019-04-05 PROCEDURE — 88312 SPECIAL STAINS GROUP 1: CPT | Mod: 26

## 2019-04-05 RX ORDER — LIDOCAINE 4 G/100G
1 CREAM TOPICAL DAILY
Qty: 0 | Refills: 0 | Status: DISCONTINUED | OUTPATIENT
Start: 2019-04-05 | End: 2019-04-08

## 2019-04-05 RX ORDER — POTASSIUM CHLORIDE 20 MEQ
10 PACKET (EA) ORAL
Qty: 0 | Refills: 0 | Status: COMPLETED | OUTPATIENT
Start: 2019-04-05 | End: 2019-04-05

## 2019-04-05 RX ORDER — ONDANSETRON 8 MG/1
4 TABLET, FILM COATED ORAL EVERY 6 HOURS
Qty: 0 | Refills: 0 | Status: DISCONTINUED | OUTPATIENT
Start: 2019-04-05 | End: 2019-04-08

## 2019-04-05 RX ORDER — ACETAMINOPHEN 500 MG
650 TABLET ORAL EVERY 6 HOURS
Qty: 0 | Refills: 0 | Status: DISCONTINUED | OUTPATIENT
Start: 2019-04-05 | End: 2019-04-08

## 2019-04-05 RX ADMIN — Medication 650 MILLIGRAM(S): at 19:57

## 2019-04-05 RX ADMIN — Medication 1 GRAM(S): at 18:56

## 2019-04-05 RX ADMIN — Medication 100 MILLIEQUIVALENT(S): at 08:48

## 2019-04-05 RX ADMIN — Medication 100 MILLIEQUIVALENT(S): at 10:25

## 2019-04-05 RX ADMIN — ONDANSETRON 4 MILLIGRAM(S): 8 TABLET, FILM COATED ORAL at 23:33

## 2019-04-05 RX ADMIN — Medication 10 MILLIEQUIVALENT(S): at 12:30

## 2019-04-05 RX ADMIN — ONDANSETRON 4 MILLIGRAM(S): 8 TABLET, FILM COATED ORAL at 18:56

## 2019-04-05 RX ADMIN — LACTULOSE 10 GRAM(S): 10 SOLUTION ORAL at 05:28

## 2019-04-05 RX ADMIN — INSULIN GLARGINE 35 UNIT(S): 100 INJECTION, SOLUTION SUBCUTANEOUS at 21:34

## 2019-04-05 RX ADMIN — GABAPENTIN 200 MILLIGRAM(S): 400 CAPSULE ORAL at 05:28

## 2019-04-05 RX ADMIN — GABAPENTIN 200 MILLIGRAM(S): 400 CAPSULE ORAL at 12:30

## 2019-04-05 RX ADMIN — Medication 1 GRAM(S): at 23:33

## 2019-04-05 RX ADMIN — URSODIOL 300 MILLIGRAM(S): 250 TABLET, FILM COATED ORAL at 12:30

## 2019-04-05 RX ADMIN — PANTOPRAZOLE SODIUM 40 MILLIGRAM(S): 20 TABLET, DELAYED RELEASE ORAL at 05:29

## 2019-04-05 RX ADMIN — Medication 650 MILLIGRAM(S): at 20:51

## 2019-04-05 RX ADMIN — LACTULOSE 10 GRAM(S): 10 SOLUTION ORAL at 23:33

## 2019-04-05 RX ADMIN — PANTOPRAZOLE SODIUM 40 MILLIGRAM(S): 20 TABLET, DELAYED RELEASE ORAL at 18:56

## 2019-04-05 RX ADMIN — LIDOCAINE 1 PATCH: 4 CREAM TOPICAL at 19:53

## 2019-04-05 RX ADMIN — URSODIOL 300 MILLIGRAM(S): 250 TABLET, FILM COATED ORAL at 21:35

## 2019-04-05 RX ADMIN — Medication 2000 UNIT(S): at 18:55

## 2019-04-05 RX ADMIN — GABAPENTIN 200 MILLIGRAM(S): 400 CAPSULE ORAL at 18:57

## 2019-04-05 RX ADMIN — URSODIOL 300 MILLIGRAM(S): 250 TABLET, FILM COATED ORAL at 05:28

## 2019-04-05 RX ADMIN — LACTULOSE 10 GRAM(S): 10 SOLUTION ORAL at 18:56

## 2019-04-05 RX ADMIN — GABAPENTIN 200 MILLIGRAM(S): 400 CAPSULE ORAL at 23:33

## 2019-04-05 RX ADMIN — Medication 1 GRAM(S): at 05:28

## 2019-04-05 NOTE — DIETITIAN INITIAL EVALUATION ADULT. - SIGNS/SYMPTOMS
as evidenced by elevated POCT -347 mg/dl, HgbA1c 10.5%, Bili 3.7H, as evidenced by pt with h/o gastritis/esophagitis undergoing workup for GI bleed

## 2019-04-05 NOTE — DIETITIAN INITIAL EVALUATION ADULT. - PROBLEM SELECTOR PLAN 3
- elevated lactate at 7.2 which improved to 4.8  - no fever, no wbc count, does not meet SIRS or sepsis criteria at this time  - glucose 399, anion gap with corrected sodium and albumin: 23  - no indications for abx at this time  - r/o DKA, f/u serum ketone and ABG  - f/u repeat lactate, IV Fluids.

## 2019-04-05 NOTE — DIETITIAN INITIAL EVALUATION ADULT. - ADHERENCE
Pt does not report following any specialized diet PTA. States that he checks fingersticks 2x daily and values range from 125-225 mg/dl. Admits that he hasn't checked very much in the past week because he wasn't feeling well. HgbA1c 10.5% on this admission indicating poor blood glucose control (erratic, inconsistent eating patterns likely combined with ?medication noncompliance).

## 2019-04-05 NOTE — DIETITIAN INITIAL EVALUATION ADULT. - NS AS NUTRI INTERV ED CONTENT
Pt provided with written and verbal nutrition education on consistent carbohydrate diet. Topics discussed include: optimal sources of carbohydrates, complex vs. simple carbohydrates, inclusion of whole grains and fiber, mixed meals (pairing protein and carbohydrate for optimal blood glucose response), and timing of meals/snacks. Pt verbalized understanding of diet and will follow up regarding teach back ability. Pt provided with written and verbal nutrition education on weight loss and consistent carbohydrate diet. Topics discussed include: optimal sources of carbohydrates, portion control, including fruits/vegetables/whole grains, lowering sodium intake,  complex vs. simple carbohydrates, inclusion of whole grains and fiber, mixed meals (pairing protein and carbohydrate for optimal blood glucose response), and timing of meals/snacks. Pt verbalized understanding of diet and will follow up regarding teach back ability.

## 2019-04-05 NOTE — DIETITIAN INITIAL EVALUATION ADULT. - ENERGY NEEDS
Wt: (), Ht: 72 inches, BMI: , IBW: 178 pounds  +/-10%, %IBW: Wt: 264 pounds, Ht: 72 inches, BMI: 35.8 kg/2, IBW: 178 pounds  +/-10%, %IBW: 148%  no edema or pressure injuries

## 2019-04-05 NOTE — DIETITIAN INITIAL EVALUATION ADULT. - OTHER INFO
Pt seen for nutrition assessment for elevated HgbA12 (10.5%) on this admission. Per chart, pt is 64 Y/O M with PMH of type 2 DM (on insulin), HTN, HLD, nephrolithiasis, neuropathy, NAFLD, cirrhosis, gastritis, esophagitis admitted for N/V/abdominal pain going for EGD today. Pt with previous admission to [facility] December 2018 for UGID S/P clips and cauterization. Pt seen for nutrition assessment for elevated HgbA12 (10.5%) on this admission. Per chart, pt is 62 Y/O M with PMH of type 2 DM (on insulin), HTN, HLD, nephrolithiasis, neuropathy, NAFLD, cirrhosis, gastritis, esophagitis admitted for N/V/abdominal pain going for EGD today. Pt with previous admission to [facility] December 2018 for UGID S/P clips and cauterization. Pt states he has had very little of full liquids thus far, only able to tolerate a little bit of jello and apple juice 2/2 persistent nausea. Unclear weight history: pt states he has lost 62-64 pounds since December 15th 2018 from his highest wt of 365 pounds. Pt presently 264 pounds (current) vs. admission 287 pounds (likely inaccurate). Last BM yesterday. No dysphagia, NKFA.

## 2019-04-05 NOTE — DIETITIAN INITIAL EVALUATION ADULT. - PROBLEM SELECTOR PLAN 5
- MISS TID, YOSELIN at bedtime, Lantus 25 bedtime   -FS Q AC HS with HISS  - hypoglycemic protocol   - consistent carb diet  - f/u hgA1c

## 2019-04-05 NOTE — DIETITIAN INITIAL EVALUATION ADULT. - ETIOLOGY
related to endocrine/liver dysfunction (NAFLD, cirrhosis) related to alteration in GI tract structure/function

## 2019-04-05 NOTE — PROGRESS NOTE ADULT - SUBJECTIVE AND OBJECTIVE BOX
Patient is a 63y old  Male who presents with a chief complaint of n/v, abdominal pain (05 Apr 2019 08:35)      INTERVAL HPI:  Patient is a 61 y/o M with PMHx of Type 2 DM on insulin, HTN, HLD, obesity, nephrolithiasis, neuropathy, Hepatic encephalopathy, NAFLD, cirrhosis, Hyperammonemia,  Gastritis/ esophagitis, recent admission to [facility] on 12/2018 for syncope, UGIB s/p EGD, duodenal ulcer, s/p clips and cauterization, and e. coli bacteremia, presented to ED after 2 days of abdominal pain with nausea and vomiting. Reported multiple episodes of brown colored emesis. Patient also reported recent BMs that are dark in color, no diarrhea. Patient stated that he f/u with GI outpatient on Monday without any significant concerns. Patient also reported his son at home is also sick at home recently with similar symptoms of nausea, vomiting, and abdominal pain. Patient denies any headache, dizziness, visual changes, chest pain, sob, gamez, diarrhea, dysuria, or hematuria.  As per pt he has NOT taken his insulin on Tuesday as he was vomiting,    In the ED, initially VSS, most recent vs showed tachycardia 113bpm, and 150/78. Patient on room air  WBC wnl, h/h 12.6/36/8, plt 125  Initial lactate 7.2 --> 4.8 s/p 2 IVF bolus  Na 141, K 4.5, bicarb 23, anion gap serum 14, BUN 37, Cr 1.3, Glucose 399  albumin 2.4, Tbili 5, alk phos 273, Ast 42, GFR 58  lipase 52, amylase 26, serum acetone negative  UA; significant for RBC 25-50, WBC 6-10, protein 25, small ketones, moderate blood, granular cast, 1000 glucose  CT abdomen and pelvis w/ PO and IV contrast: Cirrhosis and portal hypertension. Mild, nonspecific haziness at the root of the small bowel mesentery; correlate clinically for pancreatitis. Pyloric and duodenal bulb thickening, recommend further clinical correlation for peptic ulcer disease.   EKG showed sinus tachy  Received 2x IVF bolus and 1x Zofran in ED.    4/4/19 :Pt seen, examined, no acute events overnight, patient complains of GERD like sxs. FOBT +    OVERNIGHT EVENTS: Pt seen, examined. No acute events overnight. H-H stable, EGD today.      Home Medications:  aluminum hydroxide-magnesium hydroxide 200 mg-200 mg/5 mL oral suspension: 30 milliliter(s) orally 2 times a day (03 Apr 2019 11:43)  furosemide 20 mg oral tablet: 2 tab(s) orally once a day (03 Apr 2019 11:43)  insulin glargine: 50 unit(s) subcutaneous once a day (at bedtime) pt has pen (03 Apr 2019 11:43)  lactulose 10 g/15 mL oral syrup: 15 milliliter(s) orally 4 times a day (03 Apr 2019 11:43)  pantoprazole 40 mg oral delayed release tablet: 1 tab(s) orally once a day (03 Apr 2019 11:43)  phytonadione 100 mcg oral tablet: 1 tab(s) orally once a day (03 Apr 2019 11:43)  potassium chloride 10 mEq oral tablet, extended release: 1 tab(s) orally once a day (03 Apr 2019 11:43)  pravastatin 40 mg oral tablet: 1 tab(s) orally once a day (03 Apr 2019 11:43)  rifAXIMin 550 mg oral tablet: 1 tab(s) orally 2 times a day (03 Apr 2019 11:43)  Vitamin D3 2000 intl units oral tablet: 1 tab(s) orally once a day (03 Apr 2019 11:43)      MEDICATIONS  (STANDING):  cholecalciferol 2000 Unit(s) Oral daily  dextrose 5%. 1000 milliLiter(s) (50 mL/Hr) IV Continuous <Continuous>  dextrose 50% Injectable 12.5 Gram(s) IV Push once  dextrose 50% Injectable 25 Gram(s) IV Push once  dextrose 50% Injectable 25 Gram(s) IV Push once  gabapentin 200 milliGRAM(s) Oral four times a day  insulin glargine Injectable (LANTUS) 35 Unit(s) SubCutaneous at bedtime  insulin lispro (HumaLOG) corrective regimen sliding scale   SubCutaneous three times a day before meals  insulin lispro (HumaLOG) corrective regimen sliding scale   SubCutaneous at bedtime  lactulose Syrup 10 Gram(s) Oral four times a day  pantoprazole  Injectable 40 milliGRAM(s) IV Push every 12 hours  potassium chloride    Tablet ER 10 milliEquivalent(s) Oral daily  potassium chloride  10 mEq/100 mL IVPB 10 milliEquivalent(s) IV Intermittent every 1 hour  rifaximin 550 milliGRAM(s) Oral two times a day  sodium chloride 0.45%. 1000 milliLiter(s) (75 mL/Hr) IV Continuous <Continuous>  sucralfate 1 Gram(s) Oral every 6 hours  ursodiol Capsule 300 milliGRAM(s) Oral three times a day    MEDICATIONS  (PRN):  aluminum hydroxide/magnesium hydroxide/simethicone Suspension 30 milliLiter(s) Oral every 4 hours PRN Dyspepsia  dextrose 40% Gel 15 Gram(s) Oral once PRN Blood Glucose LESS THAN 70 milliGRAM(s)/deciliter  glucagon  Injectable 1 milliGRAM(s) IntraMuscular once PRN Glucose LESS THAN 70 milligrams/deciliter  ondansetron Injectable 4 milliGRAM(s) IV Push every 6 hours PRN Nausea and/or Vomiting      Allergies    codeine (Anaphylaxis)    Intolerances      REVIEW OF SYSTEMS:  CONSTITUTIONAL: No fever, No chills, No fatigue, No myalgia, No Body ache, No Weakness  EYES: No eye pain,  No visual disturbances, No discharge, No Redness  ENMT:  No ear pain, No nose bleed, No vertigo; No sinus or throat pain, No Congestion  NECK: No pain, No stiffness  RESPIRATORY: No cough, wheezing, No  hemoptysis, No shortness of breath  CARDIOVASCULAR: No chest pain, palpitations  GASTROINTESTINAL: MILD EPIGASTRIC PAIN, NAUSEA, No vomiting; No diarrhea or constipation. [  ] BM - no BM yet this AM  GENITOURINARY: No dysuria, No frequency, No urgency, No hematuria, or incontinence  NEUROLOGICAL: No headaches, No dizziness, No numbness, No tingling, No tremors, No weakness  EXT: No Swelling, No Pain, No Edema  SKIN:  [  ] No itching, burning, rashes, or lesions   MUSCULOSKELETAL: No joint pain or swelling; No muscle pain, No back pain, No extremity pain  PSYCHIATRIC: No depression, anxiety, mood swings or difficulty sleeping at night  PAIN SCALE: [  ] None  [x  ] Other- stomach discomfort  ROS Unable to obtain due to - [  ] Dementia  [  ] Lethargy  [  ] Sedated [  ] non verbal  REST OF REVIEW Of SYSTEM - [ x ] Normal     Vital Signs Last 24 Hrs  T(C): 36.8 (05 Apr 2019 04:46), Max: 36.9 (04 Apr 2019 21:24)  T(F): 98.2 (05 Apr 2019 04:46), Max: 98.5 (04 Apr 2019 21:24)  HR: 83 (05 Apr 2019 04:46) (83 - 96)  BP: 127/61 (05 Apr 2019 04:46) (126/70 - 144/72)  BP(mean): --  RR: 17 (05 Apr 2019 04:46) (17 - 18)  SpO2: 95% (05 Apr 2019 04:46) (94% - 95%)  Finger Stick        04-04 @ 07:01  -  04-05 @ 07:00  --------------------------------------------------------  IN: 900 mL / OUT: 0 mL / NET: 900 mL      PHYSICAL EXAM:  GENERAL:  [ x ] NAD , [ x ] well appearing, [  ] Agitated, [  ] Drowsy,  [  ] Lethargy, [  ] confused   HEAD:  [ x ] Normal, [  ] Other  EYES:  [ x ] EOMI, [  x] PERRLA, [ x ] conjunctiva and sclera clear normal, [  ] Other,  [  ] Pallor,[  ] Discharge  ENMT:  [ x ] Normal, [x] Moist mucous membranes, [ x ] Good dentition, [ x ] No Thrush  NECK:  [ x ] Supple, [ x ] No JVD, [  ] Normal thyroid, [  ] Lymphadenopathy [  ] Other  CHEST/LUNG:  [ x ] Clear to auscultation bilaterally, [ x ] Breath Sounds equal  [ x ] No rales, [x  ] No rhonchi  [ x ]  No wheezing  HEART:  [ x ] Regular rate and rhythm, [  ] tachycardia, [  ] Bradycardia,  [  ] irregular  [ x ] No murmurs, No rubs, No gallops, [  ] PPM in place (Mfr:  )  ABDOMEN:  [x  ] Soft, [ x ] Nontender, [ x ] Nondistended, [ x ] No mass, [ x ] Bowel sounds present, [ x ] obese  NERVOUS SYSTEM:  [ x] Alert & Oriented X3, [ x ] Nonfocal  [  ] Confusion  [  ] Encephalopathic [  ] Sedated [  ] Unable to assess, [  ] Other-  EXTREMITIES: [ x ] 2+ Peripheral Pulses, No clubbing, No cyanosis,  [  ] edema B/L lower EXT. [  ] PVD stasis skin changes B/L Lower EXT  LYMPH: No lymphadenopathy noted  SKIN:  [ x ] No rashes or lesions, [  ] Pressure Ulcers, [  ] ecchymosis, [  ] Skin Tears, [  ] Other    DIET: NPO for EGD    LABS:                        10.3   6.25  )-----------( 81       ( 05 Apr 2019 05:43 )             30.5     05 Apr 2019 05:43    143    |  107    |  22     ----------------------------<  121    3.4     |  30     |  0.99     Ca    7.9        05 Apr 2019 05:43    TPro  5.1    /  Alb  2.1    /  TBili  3.7    /  DBili  1.20   /  AST  27     /  ALT  24     /  AlkPhos  136    05 Apr 2019 05:43    PT/INR - ( 04 Apr 2019 09:09 )   PT: 17.9 sec;   INR: 1.55 ratio         PTT - ( 04 Apr 2019 09:09 )  PTT:31.4 sec      Lipase, Serum: 52 U/L (04-03-19 @ 06:54)  Amylase, Serum Total: 26 U/L (04-03-19 @ 06:54)      RADIOLOGY & ADDITIONAL TESTS:  New imaging reviewed    HEALTH ISSUES - PROBLEM Dx:  Anemia: Anemia  Diabetes mellitus type 2, uncontrolled: Diabetes mellitus type 2, uncontrolled  Need for prophylactic measure: Need for prophylactic measure  Hypercholesteremia: Hypercholesteremia  Hypertension: Hypertension  Cirrhosis: Cirrhosis  GIB (gastrointestinal bleeding): GIB (gastrointestinal bleeding)  Diabetes: Diabetes  Elevated glucose: Elevated glucose  Lactic acidosis: Lactic acidosis  Hepatic encephalopathy: Hepatic encephalopathy  Abdominal pain: Abdominal pain  Gastroenteritis: Gastroenteritis      Consultant(s) Notes Reviewed:  [ x ] YES     Care Discussed with [X] Consultants  [ x ] Patient  [  ] Family  [  ]   [  ] Social Service  [ x ] RN, [  ] Physical Therapy  DVT PPX: [ x ] Lovenox (held for EGD), [  ] S C Heparin, [  ] Coumadin, [  ] Xarelto, [  ] Eliquis, [  ] Pradaxa, [  ] IV Heparin drip, [  ] SCD [  ] Contraindication 2 to GI Bleed,[  ] Ambulation  Advanced directive: [ x ] None, [  ] DNR/DNI Patient is a 63y old  Male who presents with a chief complaint of n/v, abdominal pain (05 Apr 2019 08:35)      INTERVAL HPI:  Patient is a 61 y/o M with PMHx of Type 2 DM on insulin, HTN, HLD, obesity, nephrolithiasis, neuropathy, Hepatic encephalopathy, NAFLD, cirrhosis, Hyperammonemia,  Gastritis/ esophagitis, recent admission to [facility] on 12/2018 for syncope, UGIB s/p EGD, duodenal ulcer, s/p clips and cauterization, and e. coli bacteremia, presented to ED after 2 days of abdominal pain with nausea and vomiting. Reported multiple episodes of brown colored emesis. Patient also reported recent BMs that are dark in color, no diarrhea. Patient stated that he f/u with GI outpatient on Monday without any significant concerns. Patient also reported his son at home is also sick at home recently with similar symptoms of nausea, vomiting, and abdominal pain. Patient denies any headache, dizziness, visual changes, chest pain, sob, gamez, diarrhea, dysuria, or hematuria.  As per pt he has NOT taken his insulin on Tuesday as he was vomiting,    In the ED, initially VSS, most recent vs showed tachycardia 113bpm, and 150/78. Patient on room air  WBC wnl, h/h 12.6/36/8, plt 125  Initial lactate 7.2 --> 4.8 s/p 2 IVF bolus  Na 141, K 4.5, bicarb 23, anion gap serum 14, BUN 37, Cr 1.3, Glucose 399  albumin 2.4, Tbili 5, alk phos 273, Ast 42, GFR 58  lipase 52, amylase 26, serum acetone negative  UA; significant for RBC 25-50, WBC 6-10, protein 25, small ketones, moderate blood, granular cast, 1000 glucose  CT abdomen and pelvis w/ PO and IV contrast: Cirrhosis and portal hypertension. Mild, nonspecific haziness at the root of the small bowel mesentery; correlate clinically for pancreatitis. Pyloric and duodenal bulb thickening, recommend further clinical correlation for peptic ulcer disease.   EKG showed sinus tachy  Received 2x IVF bolus and 1x Zofran in ED.    4/4/19 :Pt seen, examined, no acute events overnight, patient complains of GERD like sxs. FOBT +    OVERNIGHT EVENTS: Pt seen, examined. No acute events overnight. H-H stable, EGD today.      Home Medications:  aluminum hydroxide-magnesium hydroxide 200 mg-200 mg/5 mL oral suspension: 30 milliliter(s) orally 2 times a day (03 Apr 2019 11:43)  furosemide 20 mg oral tablet: 2 tab(s) orally once a day (03 Apr 2019 11:43)  insulin glargine: 50 unit(s) subcutaneous once a day (at bedtime) pt has pen (03 Apr 2019 11:43)  lactulose 10 g/15 mL oral syrup: 15 milliliter(s) orally 4 times a day (03 Apr 2019 11:43)  pantoprazole 40 mg oral delayed release tablet: 1 tab(s) orally once a day (03 Apr 2019 11:43)  phytonadione 100 mcg oral tablet: 1 tab(s) orally once a day (03 Apr 2019 11:43)  potassium chloride 10 mEq oral tablet, extended release: 1 tab(s) orally once a day (03 Apr 2019 11:43)  pravastatin 40 mg oral tablet: 1 tab(s) orally once a day (03 Apr 2019 11:43)  rifAXIMin 550 mg oral tablet: 1 tab(s) orally 2 times a day (03 Apr 2019 11:43)  Vitamin D3 2000 intl units oral tablet: 1 tab(s) orally once a day (03 Apr 2019 11:43)      MEDICATIONS  (STANDING):  cholecalciferol 2000 Unit(s) Oral daily  dextrose 5%. 1000 milliLiter(s) (50 mL/Hr) IV Continuous <Continuous>  dextrose 50% Injectable 12.5 Gram(s) IV Push once  dextrose 50% Injectable 25 Gram(s) IV Push once  dextrose 50% Injectable 25 Gram(s) IV Push once  gabapentin 200 milliGRAM(s) Oral four times a day  insulin glargine Injectable (LANTUS) 35 Unit(s) SubCutaneous at bedtime  insulin lispro (HumaLOG) corrective regimen sliding scale   SubCutaneous three times a day before meals  insulin lispro (HumaLOG) corrective regimen sliding scale   SubCutaneous at bedtime  lactulose Syrup 10 Gram(s) Oral four times a day  pantoprazole  Injectable 40 milliGRAM(s) IV Push every 12 hours  potassium chloride    Tablet ER 10 milliEquivalent(s) Oral daily  potassium chloride  10 mEq/100 mL IVPB 10 milliEquivalent(s) IV Intermittent every 1 hour  rifaximin 550 milliGRAM(s) Oral two times a day  sodium chloride 0.45%. 1000 milliLiter(s) (75 mL/Hr) IV Continuous <Continuous>  sucralfate 1 Gram(s) Oral every 6 hours  ursodiol Capsule 300 milliGRAM(s) Oral three times a day    MEDICATIONS  (PRN):  aluminum hydroxide/magnesium hydroxide/simethicone Suspension 30 milliLiter(s) Oral every 4 hours PRN Dyspepsia  dextrose 40% Gel 15 Gram(s) Oral once PRN Blood Glucose LESS THAN 70 milliGRAM(s)/deciliter  glucagon  Injectable 1 milliGRAM(s) IntraMuscular once PRN Glucose LESS THAN 70 milligrams/deciliter  ondansetron Injectable 4 milliGRAM(s) IV Push every 6 hours PRN Nausea and/or Vomiting      Allergies    codeine (Anaphylaxis)    Intolerances      REVIEW OF SYSTEMS:  CONSTITUTIONAL: No fever, No chills, No fatigue, No myalgia, No Body ache, No Weakness  EYES: No eye pain,  No visual disturbances, No discharge, No Redness  ENMT:  No ear pain, No nose bleed, No vertigo; No sinus or throat pain, No Congestion  NECK: No pain, No stiffness  RESPIRATORY: No cough, wheezing, No  hemoptysis, No shortness of breath  CARDIOVASCULAR: No chest pain, palpitations  GASTROINTESTINAL: MILD EPIGASTRIC PAIN, NAUSEA, No vomiting; No diarrhea or constipation. [  ] BM - no BM yet this AM  GENITOURINARY: No dysuria, No frequency, No urgency, No hematuria, or incontinence  NEUROLOGICAL: No headaches, No dizziness, No numbness, No tingling, No tremors, No weakness  EXT: No Swelling, No Pain, No Edema  SKIN:  [  ] No itching, burning, rashes, or lesions   MUSCULOSKELETAL: No joint pain or swelling; No muscle pain, No back pain, No extremity pain  PSYCHIATRIC: No depression, anxiety, mood swings or difficulty sleeping at night  PAIN SCALE: [  ] None  [x  ] Other- stomach discomfort  ROS Unable to obtain due to - [  ] Dementia  [  ] Lethargy  [  ] Sedated [  ] non verbal  REST OF REVIEW Of SYSTEM - [ x ] Normal     Vital Signs Last 24 Hrs  T(C): 36.8 (05 Apr 2019 04:46), Max: 36.9 (04 Apr 2019 21:24)  T(F): 98.2 (05 Apr 2019 04:46), Max: 98.5 (04 Apr 2019 21:24)  HR: 83 (05 Apr 2019 04:46) (83 - 96)  BP: 127/61 (05 Apr 2019 04:46) (126/70 - 144/72)  BP(mean): --  RR: 17 (05 Apr 2019 04:46) (17 - 18)  SpO2: 95% (05 Apr 2019 04:46) (94% - 95%)  Finger Stick        04-04 @ 07:01  -  04-05 @ 07:00  --------------------------------------------------------  IN: 900 mL / OUT: 0 mL / NET: 900 mL      PHYSICAL EXAM:  GENERAL:  [ x ] NAD , [ x ] well appearing, [  ] Agitated, [  ] Drowsy,  [  ] Lethargy, [  ] confused   HEAD:  [ x ] Normal, [  ] Other  EYES:  [ x ] EOMI, [  x] PERRLA, [ x ] conjunctiva and sclera clear normal, [  ] Other,  [  ] Pallor,[  ] Discharge  ENMT:  [ x ] Normal, [x] Moist mucous membranes, [ x ] Good dentition, [ x ] No Thrush  NECK:  [ x ] Supple, [ x ] No JVD, [  ] Normal thyroid, [  ] Lymphadenopathy [  ] Other  CHEST/LUNG:  [ x ] Clear to auscultation bilaterally, [ x ] Breath Sounds equal  [ x ] No rales, [x  ] No rhonchi  [ x ]  No wheezing  HEART:  [ x ] Regular rate and rhythm, [  ] tachycardia, [  ] Bradycardia,  [  ] irregular  [ x ] No murmurs, No rubs, No gallops, [  ] PPM in place (Mfr:  )  ABDOMEN:  [x  ] Soft, [ x ] Nontender, [ x ] Nondistended, [ x ] No mass, [ x ] Bowel sounds present, [ x ] obese  NERVOUS SYSTEM:  [ x] Alert & Oriented X3, [ x ] Nonfocal  [  ] Confusion  [  ] Encephalopathic [  ] Sedated [  ] Unable to assess, [  ] Other-  EXTREMITIES: [ x ] 2+ Peripheral Pulses, No clubbing, No cyanosis,  [  ] edema B/L lower EXT. [  ] PVD stasis skin changes B/L Lower EXT  LYMPH: No lymphadenopathy noted  SKIN:  [ x ] No rashes or lesions, [  ] Pressure Ulcers, [  ] ecchymosis, [  ] Skin Tears, [  ] Other    DIET: NPO for EGD    LABS:                        10.3   6.25  )-----------( 81       ( 05 Apr 2019 05:43 )             30.5     05 Apr 2019 05:43    143    |  107    |  22     ----------------------------<  121    3.4     |  30     |  0.99     Ca    7.9        05 Apr 2019 05:43    TPro  5.1    /  Alb  2.1    /  TBili  3.7    /  DBili  1.20   /  AST  27     /  ALT  24     /  AlkPhos  136    05 Apr 2019 05:43    PT/INR - ( 04 Apr 2019 09:09 )   PT: 17.9 sec;   INR: 1.55 ratio         PTT - ( 04 Apr 2019 09:09 )  PTT:31.4 sec      Lipase, Serum: 52 U/L (04-03-19 @ 06:54)  Amylase, Serum Total: 26 U/L (04-03-19 @ 06:54)      RADIOLOGY & ADDITIONAL TESTS:  New imaging reviewed    HEALTH ISSUES - PROBLEM Dx:  Anemia: Anemia  Diabetes mellitus type 2, uncontrolled: Diabetes mellitus type 2, uncontrolled  Need for prophylactic measure: Need for prophylactic measure  Hypercholesteremia: Hypercholesteremia  Hypertension: Hypertension  Cirrhosis: Cirrhosis  GIB (gastrointestinal bleeding): GIB (gastrointestinal bleeding)  Diabetes: Diabetes  Elevated glucose: Elevated glucose  Lactic acidosis: Lactic acidosis  Hepatic encephalopathy: Hepatic encephalopathy  Abdominal pain: Abdominal pain  Gastroenteritis: Gastroenteritis      Consultant(s) Notes Reviewed:  [ x ] YES     Care Discussed with [X] Consultants  [ x ] Patient  [  ] Family  [  ]   [  ] Social Service  [ x ] RN, [  ] Physical Therapy  DVT PPX: [  ] Lovenox, [  ] S C Heparin, [  ] Coumadin, [  ] Xarelto, [  ] Eliquis, [  ] Pradaxa, [  ] IV Heparin drip, [  ] SCD [  ] Contraindication 2 to GI Bleed,[  ] Ambulation  Advanced directive: [ x ] None, [  ] DNR/DNI Patient is a 63y old  Male who presents with a chief complaint of n/v, abdominal pain (05 Apr 2019 08:35)      INTERVAL HPI:  Patient is a 63 y/o M with PMHx of Type 2 DM on insulin, HTN, HLD, obesity, nephrolithiasis, neuropathy, Hepatic encephalopathy, NAFLD, cirrhosis, Hyperammonemia,  Gastritis/ esophagitis, recent admission to [facility] on 12/2018 for syncope, UGIB s/p EGD, duodenal ulcer, s/p clips and cauterization, and e. coli bacteremia, presented to ED after 2 days of abdominal pain with nausea and vomiting. Reported multiple episodes of brown colored emesis. Patient also reported recent BMs that are dark in color, no diarrhea. Patient stated that he f/u with GI outpatient on Monday without any significant concerns. Patient also reported his son at home is also sick at home recently with similar symptoms of nausea, vomiting, and abdominal pain. Patient denies any headache, dizziness, visual changes, chest pain, sob, gamez, diarrhea, dysuria, or hematuria.  As per pt he has NOT taken his insulin on Tuesday as he was vomiting,    In the ED, initially VSS, most recent vs showed tachycardia 113bpm, and 150/78. Patient on room air  WBC wnl, h/h 12.6/36/8, plt 125  Initial lactate 7.2 --> 4.8 s/p 2 IVF bolus  Na 141, K 4.5, bicarb 23, anion gap serum 14, BUN 37, Cr 1.3, Glucose 399  albumin 2.4, Tbili 5, alk phos 273, Ast 42, GFR 58  lipase 52, amylase 26, serum acetone negative  UA; significant for RBC 25-50, WBC 6-10, protein 25, small ketones, moderate blood, granular cast, 1000 glucose  CT abdomen and pelvis w/ PO and IV contrast: Cirrhosis and portal hypertension. Mild, nonspecific haziness at the root of the small bowel mesentery; correlate clinically for pancreatitis. Pyloric and duodenal bulb thickening, recommend further clinical correlation for peptic ulcer disease.   EKG showed sinus tachy  Received 2x IVF bolus and 1x Zofran in ED.    4/4/19 :Pt seen, examined, no acute events overnight, patient complains of GERD like sxs. FOBT + S/P EGD -Large non healing ulcer, No bleeding ,plan for MRI Abdomen with contrast today    OVERNIGHT EVENTS: Pt seen, examined. No acute events overnight. H-H stable, EGD today.      Home Medications:  aluminum hydroxide-magnesium hydroxide 200 mg-200 mg/5 mL oral suspension: 30 milliliter(s) orally 2 times a day (03 Apr 2019 11:43)  furosemide 20 mg oral tablet: 2 tab(s) orally once a day (03 Apr 2019 11:43)  insulin glargine: 50 unit(s) subcutaneous once a day (at bedtime) pt has pen (03 Apr 2019 11:43)  lactulose 10 g/15 mL oral syrup: 15 milliliter(s) orally 4 times a day (03 Apr 2019 11:43)  pantoprazole 40 mg oral delayed release tablet: 1 tab(s) orally once a day (03 Apr 2019 11:43)  phytonadione 100 mcg oral tablet: 1 tab(s) orally once a day (03 Apr 2019 11:43)  potassium chloride 10 mEq oral tablet, extended release: 1 tab(s) orally once a day (03 Apr 2019 11:43)  pravastatin 40 mg oral tablet: 1 tab(s) orally once a day (03 Apr 2019 11:43)  rifAXIMin 550 mg oral tablet: 1 tab(s) orally 2 times a day (03 Apr 2019 11:43)  Vitamin D3 2000 intl units oral tablet: 1 tab(s) orally once a day (03 Apr 2019 11:43)      MEDICATIONS  (STANDING):  cholecalciferol 2000 Unit(s) Oral daily  dextrose 5%. 1000 milliLiter(s) (50 mL/Hr) IV Continuous <Continuous>  dextrose 50% Injectable 12.5 Gram(s) IV Push once  dextrose 50% Injectable 25 Gram(s) IV Push once  dextrose 50% Injectable 25 Gram(s) IV Push once  gabapentin 200 milliGRAM(s) Oral four times a day  insulin glargine Injectable (LANTUS) 35 Unit(s) SubCutaneous at bedtime  insulin lispro (HumaLOG) corrective regimen sliding scale   SubCutaneous three times a day before meals  insulin lispro (HumaLOG) corrective regimen sliding scale   SubCutaneous at bedtime  lactulose Syrup 10 Gram(s) Oral four times a day  pantoprazole  Injectable 40 milliGRAM(s) IV Push every 12 hours  potassium chloride    Tablet ER 10 milliEquivalent(s) Oral daily  potassium chloride  10 mEq/100 mL IVPB 10 milliEquivalent(s) IV Intermittent every 1 hour  rifaximin 550 milliGRAM(s) Oral two times a day  sodium chloride 0.45%. 1000 milliLiter(s) (75 mL/Hr) IV Continuous <Continuous>  sucralfate 1 Gram(s) Oral every 6 hours  ursodiol Capsule 300 milliGRAM(s) Oral three times a day    MEDICATIONS  (PRN):  aluminum hydroxide/magnesium hydroxide/simethicone Suspension 30 milliLiter(s) Oral every 4 hours PRN Dyspepsia  dextrose 40% Gel 15 Gram(s) Oral once PRN Blood Glucose LESS THAN 70 milliGRAM(s)/deciliter  glucagon  Injectable 1 milliGRAM(s) IntraMuscular once PRN Glucose LESS THAN 70 milligrams/deciliter  ondansetron Injectable 4 milliGRAM(s) IV Push every 6 hours PRN Nausea and/or Vomiting      Allergies    codeine (Anaphylaxis)    Intolerances      REVIEW OF SYSTEMS: C/O burning discomfort in stomach & Esophagus  CONSTITUTIONAL: No fever, No chills, No fatigue, No myalgia, No Body ache, No Weakness  EYES: No eye pain,  No visual disturbances, No discharge, No Redness  ENMT:  No ear pain, No nose bleed, No vertigo; No sinus or throat pain, No Congestion  NECK: No pain, No stiffness  RESPIRATORY: No cough, wheezing, No  hemoptysis, No shortness of breath  CARDIOVASCULAR: No chest pain, palpitations  GASTROINTESTINAL: MILD EPIGASTRIC PAIN, NAUSEA, No vomiting; No diarrhea or constipation. [  ] BM - no BM yet this AM  GENITOURINARY: No dysuria, No frequency, No urgency, No hematuria, or incontinence  NEUROLOGICAL: No headaches, No dizziness, No numbness, No tingling, No tremors, No weakness  EXT: No Swelling, No Pain, No Edema  SKIN:  [ x ] No itching, burning, rashes, or lesions   MUSCULOSKELETAL: No joint pain or swelling; No muscle pain, No back pain, No extremity pain  PSYCHIATRIC: No depression, anxiety, mood swings or difficulty sleeping at night  PAIN SCALE: [  ] None  [x  ] Other- stomach discomfort  ROS Unable to obtain due to - [  ] Dementia  [  ] Lethargy  [  ] Sedated [  ] non verbal  REST OF REVIEW Of SYSTEM - [ x ] Normal     Vital Signs Last 24 Hrs  T(C): 36.8 (05 Apr 2019 04:46), Max: 36.9 (04 Apr 2019 21:24)  T(F): 98.2 (05 Apr 2019 04:46), Max: 98.5 (04 Apr 2019 21:24)  HR: 83 (05 Apr 2019 04:46) (83 - 96)  BP: 127/61 (05 Apr 2019 04:46) (126/70 - 144/72)  BP(mean): --  RR: 17 (05 Apr 2019 04:46) (17 - 18)  SpO2: 95% (05 Apr 2019 04:46) (94% - 95%)  Finger Stick        04-04 @ 07:01  -  04-05 @ 07:00  --------------------------------------------------------  IN: 900 mL / OUT: 0 mL / NET: 900 mL      PHYSICAL EXAM:  GENERAL:  [ x ] NAD , [ x ] well appearing, [  ] Agitated, [  ] Drowsy,  [  ] Lethargy, [  ] confused   HEAD:  [ x ] Normal, [  ] Other  EYES:  [ x ] EOMI, [  x] PERRLA, [ x ] conjunctiva and sclera clear normal, [  ] Other,  [  ] Pallor,[  ] Discharge  ENMT:  [ x ] Normal, [x] Moist mucous membranes, [ x ] Good dentition, [ x ] No Thrush  NECK:  [ x ] Supple, [ x ] No JVD, [  ] Normal thyroid, [  ] Lymphadenopathy [  ] Other  CHEST/LUNG:  [ x ] Clear to auscultation bilaterally, [ x ] Breath Sounds equal  [ x ] No rales, [x  ] No rhonchi  [ x ]  No wheezing  HEART:  [ x ] Regular rate and rhythm, [  ] tachycardia, [  ] Bradycardia,  [  ] irregular  [ x ] No murmurs, No rubs, No gallops, [  ] PPM in place (Mfr:  )  ABDOMEN:  [x  ] Soft, [ x ] Nontender, [ x ] Nondistended, [ x ] No mass, [ x ] Bowel sounds present, [ x ] obese  NERVOUS SYSTEM:  [ x] Alert & Oriented X3, [ x ] Nonfocal  [  ] Confusion  [  ] Encephalopathic [  ] Sedated [  ] Unable to assess, [  ] Other-  EXTREMITIES: [ x ] 2+ Peripheral Pulses, No clubbing, No cyanosis,  [  ] edema B/L lower EXT. [  ] PVD stasis skin changes B/L Lower EXT  LYMPH: No lymphadenopathy noted  SKIN:  [ x ] No rashes or lesions, [  ] Pressure Ulcers, [  ] ecchymosis, [  ] Skin Tears, [  ] Other    DIET: NPO for EGD    LABS:                        10.3   6.25  )-----------( 81       ( 05 Apr 2019 05:43 )             30.5     05 Apr 2019 05:43    143    |  107    |  22     ----------------------------<  121    3.4     |  30     |  0.99     Ca    7.9        05 Apr 2019 05:43    TPro  5.1    /  Alb  2.1    /  TBili  3.7    /  DBili  1.20   /  AST  27     /  ALT  24     /  AlkPhos  136    05 Apr 2019 05:43    PT/INR - ( 04 Apr 2019 09:09 )   PT: 17.9 sec;   INR: 1.55 ratio         PTT - ( 04 Apr 2019 09:09 )  PTT:31.4 sec      Lipase, Serum: 52 U/L (04-03-19 @ 06:54)  Amylase, Serum Total: 26 U/L (04-03-19 @ 06:54)      RADIOLOGY & ADDITIONAL TESTS:  New imaging reviewed    HEALTH ISSUES - PROBLEM Dx:  Anemia: Anemia  Diabetes mellitus type 2, uncontrolled: Diabetes mellitus type 2, uncontrolled  Need for prophylactic measure: Need for prophylactic measure  Hypercholesteremia: Hypercholesteremia  Hypertension: Hypertension  Cirrhosis: Cirrhosis  GIB (gastrointestinal bleeding): GIB (gastrointestinal bleeding)  Diabetes: Diabetes  Elevated glucose: Elevated glucose  Lactic acidosis: Lactic acidosis  Hepatic encephalopathy: Hepatic encephalopathy  Abdominal pain: Abdominal pain  Gastroenteritis: Gastroenteritis      Consultant(s) Notes Reviewed:  [ x ] YES     Care Discussed with [X] Consultants  [ x ] Patient  [  ] Family  [  ]   [  ] Social Service  [ x ] RN, [  ] Physical Therapy  DVT PPX: [  ] Lovenox, [  ] S C Heparin, [  ] Coumadin, [  ] Xarelto, [  ] Eliquis, [  ] Pradaxa, [  ] IV Heparin drip, [x  ] SCD [  ] Contraindication 2 to GI Bleed,[  ] Ambulation  Advanced directive: [ x ] None, [  ] DNR/DNI

## 2019-04-05 NOTE — DIETITIAN INITIAL EVALUATION ADULT. - PROBLEM SELECTOR PLAN 1
- unspecific generalized abdominal pain x2 days with nausea and vomiting, admitted to son at home with similar symptoms, possible viral gastroenteritis  - abdominal exam unremarkable, no fever or elevated wbc  - noted elevated lactate, need to evaluate for other causes of lactate acidosis  - hx of GIB s/p clips and cauterization, reported brown color emesis and dark color stool. Evaluated by GI, recommended gentle hydration, PPI BID, liquid diet, Zofran PRN.   - f/u FOBT, monitor h/h for possible GIB  - CT abdomen with Po and IV contrast showed PUD, cirrhosis, ?pancreatitis (neg amylase and lipase), Likely Gastr enteritis  - trend LFTs, avoid hepatotoxic medications   - elevated glucose, unspecific abdominal pain with lactic acidosis, ketones in urine, Doubt DKA, f/u ABG, f/u serum ketone

## 2019-04-05 NOTE — DIETITIAN INITIAL EVALUATION ADULT. - NS AS NUTRI INTERV MEALS SNACK3
Advance diet to consistent carbohydrate, clear liquid diet. As medically feasible, advance to low fiber, consistent carbohydrate (no snack), DASH/TLC with GERD precautions. Monitor tolerance of prescribed diet./Fiber - modified diet

## 2019-04-05 NOTE — PROGRESS NOTE ADULT - PROBLEM SELECTOR PLAN 4
-hyperglycemic on admission, patient appears to be noncompliant with medications  -continue moderate dose ISS  -increase lantus to 35 units qhs  -Endocrine Consult: Dr. Perlman  -[facility]  -hypoglycemic protocol   - f/u hgA1c -hyperglycemic on admission, patient appears to be noncompliant with medications  -continue moderate dose ISS  -increase lantus to 35 units qhs  -Endocrine Consult: Dr. Perlman  -[facility]  -hypoglycemic protocol   -gabapentin 200 mg qid  -A1c 10.5 -hyperglycemic on admission, patient appears to be noncompliant with medications  -continue moderate dose ISS  -increase Lantus to 35 units qhs  -Endocrine Consult: Dr. Perlman  -[facility]  -hypoglycemic protocol   -gabapentin 200 mg qid  -A1c 10.5

## 2019-04-05 NOTE — PROGRESS NOTE ADULT - PROBLEM SELECTOR PLAN 3
-elevated lactate at 7.2 which improved to 4.8, now 2.2  -no fever, no wbc count, does not meet SIRS or sepsis criteria at this time, no indications for abx at this time  -glucose 399, anion gap 14 on admission  -doubt DKA, ABG alkalotic, small ketones, anion gap 9 this AM  -continue IVF -elevated lactate at 7.2 which improved to 4.8, now 2.2--> Normal  -no fever, no wbc count, does not meet SIRS or sepsis criteria at this time, no indications for abx at this time  -glucose 399, anion gap 14 on admission  -continue IVF

## 2019-04-05 NOTE — DIETITIAN INITIAL EVALUATION ADULT. - PROBLEM SELECTOR PLAN 6
- hx of GIB, reported brown colored emesis and dark color stools on admission  - will f/u FOBT  - trend h/h   - f/u GI rec  - PPI BID, Carafate

## 2019-04-05 NOTE — DIETITIAN INITIAL EVALUATION ADULT. - PROBLEM SELECTOR PLAN 2
- evaluated by GI (Dr. Franks)   - clear liquid diet, advance per symptomatic improvement and GI rec  - Zofran prn for nausea  - gentle hydration as needed

## 2019-04-05 NOTE — DIETITIAN INITIAL EVALUATION ADULT. - PERTINENT MEDS FT
"Outpatient Psychiatry Follow-Up Visit (MD/NP)    3/11/2019    Clinical Status of Patient:  Outpatient (Ambulatory)    Chief Complaint:  Alix Patel is a 63 y.o. female who presents today for follow-up of mood swings and anxiety.      Met with patient alone.      Interval History and Content of Current Session:  Interim Events/Subjective Report/Content of Current Session:      I"m not good.  I'm nervous.  I'm living panicky like panicked".  Not sleeping well despite depakote started at last appt.  She gets in bed at 8 pm to unwind but does not fall asleep until 12 or 1 am.  She awakens at 6.  Feels tired during the day.  She responds to questions at work loudly and quickly to make sure the answer is completely given.  Reports she is depressed and lonely.  She enjoys spending time with friends.  Denies desire for death but has been planning her .  Denies preoccupation with death.   Focus and concentration are okay.  She denies intolerance of others and denies irritability.      Pt reports that she has been off of all of her psychotropic medications for at least weeks after running out of them and the pharmacy apparently not requesting refills as she asked them to.  She however reports she has not been on latuda since our last appt.      She has gained weight through eating sweets and has been drinking some.      The patient has arrived at this provider's mother's house on two occasions during her treatment, most recently in Sept.  She has been encountered in several other social situations.  She is a close friend of a relative of this provider.  She becomes very upset when the subject of transitioning to another provider is again mentioned.          Psychotherapy:  · Target symptoms: depression, anxiety , mood swings  · Why chosen therapy is appropriate versus another modality: relevant to diagnosis  · Outcome monitoring methods: self-report, observation  · Therapeutic intervention type: supportive " "psychotherapy   · Topics discussed/themes: work stress, stress related to medical comorbidities, building skills sets for symptom management  · The patient's response to the intervention is accepting. The patient's progress toward treatment goals is fair.   · Duration of intervention: 10 mins    Review of Systems   · PSYCHIATRIC: Pertinant items are noted in the narrative.  · CONSTITUTIONAL: No weight gain or loss.     Past Medical, Family and Social History: The patient's past medical, family and social history have been reviewed and updated as appropriate within the electronic medical record - see encounter notes.    Compliance: yes    Side effects: None    Risk Parameters:  Patient reports no suicidal ideation  Patient reports no homicidal ideation  Patient reports no self-injurious behavior  Patient reports no violent behavior    Exam (detailed: at least 9 elements; comprehensive: all 15 elements)   Constitutional  Vitals:  Most recent vital signs, dated less than 90 days prior to this appointment, were reviewed.    Vitals:    03/11/19 1459   BP: (!) 140/79   Pulse: (!) 117   Weight: 77.6 kg (170 lb 15.5 oz)   Height: 5' 11" (1.803 m)      General:  age appropriate, well dressed, neatly groomed     Musculoskeletal  Muscle Strength/Tone:  no dyskinesia, no tremor   Gait & Station:  non-ataxic     Psychiatric  Speech:  not loud or pressured clearly audible   Mood:   Affect:  depressed, irritable  angry   Thought Process:  goal-directed, logical   Associations:  intact   Thought Content:  No SI, HI, AH or VH, no delusions   Insight  Judgement:  fair, has awareness of illness  fair behavior appropriate for circumstances   Orientation:  grossly intact   Memory: intact for content of interview   Language: grossly intact   Attention Span & Concentration:  able to focus   Fund of Knowledge:  intact and appropriate to age and level of education     Assessment and Diagnosis   Status/Progress: Based on the examination " today, the patient's problem(s) is/are well controlled.  New problems have not been presented today.   Comorbidities are complicating management of the primary condition.  There are no active rule-out diagnoses for this patient at this time.    General Impression: the patient is a 63 y.o. female who was diagnosed with bipolar disorder during her admission in 2012 with homicidal ideation towards her landlord.  She has a history of treatment for depression and anxiety.  Her past use of alprazolam has been problematic and she has been slowly tapered off of this medication.  She has had some flexing of her fingers possibly c/w TD.  She transitioned from risperidone to Seroquel with good control.  Despite improving her weight and regular exercise she has DM II and eventually became depressed.      Diagnosis:  Bipolar 2 disorder  Anxiety disorder not otherwise specified  type 2 diabetes  Tardive dyskinesia      Intervention/Counseling/Treatment Plan   · Hepatic function panel today  · Restart Depakote ER 1000 mg nightly  · restart buspirone titrating back to 30 mg twice daily, and restart Lexapro 20 mg daily  · Continue to hold Latuda  · Again discussed possible transfer of care to another provider       Return to Clinic: 4-6 weeks and monthly thereafter   rifaximin, NaCl 0.45% @ 75 ml/hr, lantus 35u daily, humalog sliding scale, Vitamin D, lactulose, KCl

## 2019-04-05 NOTE — PROGRESS NOTE ADULT - SUBJECTIVE AND OBJECTIVE BOX
CAPILLARY BLOOD GLUCOSE      POCT Blood Glucose.: 125 mg/dL (05 Apr 2019 08:12)  POCT Blood Glucose.: 204 mg/dL (04 Apr 2019 22:32)  POCT Blood Glucose.: 179 mg/dL (04 Apr 2019 22:15)  POCT Blood Glucose.: 213 mg/dL (04 Apr 2019 17:04)  POCT Blood Glucose.: 233 mg/dL (04 Apr 2019 12:01)      Vital Signs Last 24 Hrs  T(C): 36.8 (05 Apr 2019 04:46), Max: 36.9 (04 Apr 2019 21:24)  T(F): 98.2 (05 Apr 2019 04:46), Max: 98.5 (04 Apr 2019 21:24)  HR: 83 (05 Apr 2019 04:46) (83 - 96)  BP: 127/61 (05 Apr 2019 04:46) (126/70 - 144/72)  BP(mean): --  RR: 17 (05 Apr 2019 04:46) (17 - 18)  SpO2: 95% (05 Apr 2019 04:46) (94% - 95%)    General: WN/WD NAD  Respiratory: CTA B/L  CV: RRR, S1S2, no murmurs, rubs or gallops  Abdominal: Soft, NT, ND +BS, Last BM  Extremities: No edema, + peripheral pulses    Hemoglobin A1C, Whole Blood: 10.5 % (04-04 @ 11:17)   04-05    143  |  107  |  22  ----------------------------<  121<H>  3.4<L>   |  30  |  0.99    Ca    7.9<L>      05 Apr 2019 05:43    TPro  5.1<L>  /  Alb  2.1<L>  /  TBili  3.7<H>  /  DBili  1.20<H>  /  AST  27  /  ALT  24  /  AlkPhos  136<H>  04-05      dextrose 40% Gel 15 Gram(s) Oral once PRN  dextrose 50% Injectable 12.5 Gram(s) IV Push once  dextrose 50% Injectable 25 Gram(s) IV Push once  dextrose 50% Injectable 25 Gram(s) IV Push once  glucagon  Injectable 1 milliGRAM(s) IntraMuscular once PRN  insulin glargine Injectable (LANTUS) 35 Unit(s) SubCutaneous at bedtime  insulin lispro (HumaLOG) corrective regimen sliding scale   SubCutaneous three times a day before meals  insulin lispro (HumaLOG) corrective regimen sliding scale   SubCutaneous at bedtime

## 2019-04-05 NOTE — PROGRESS NOTE ADULT - PROBLEM SELECTOR PLAN 5
-suspect JEAN, no ascites on CT  -continue home med lactulose, ursodiol, rifaximin   -GI Consult: Dr. Franks  -ammonia mildly elevated 66 this AM, patient AOx3, monitor stool output -suspect JEAN, no ascites on CT- High Bili   -continue home med lactulose, ursodiol, rifaximin   -GI Consult: Dr. Franks  -ammonia mildly elevated 66 this AM, patient AOx3, monitor stool output

## 2019-04-05 NOTE — DIETITIAN INITIAL EVALUATION ADULT. - ORAL INTAKE PTA
Pt reports moderate appetite/intake PTA. States he drinks lots of fluids (mostly water but also tea and hot chocolate), for lunch would have sandwich of ham or bologna on white bread and dinner he would skip (sometimes for several days in a row) or have breaded and fried chicken cutlets (with no sides). Takes Iron and Potassium prior to admission.

## 2019-04-05 NOTE — DIETITIAN INITIAL EVALUATION ADULT. - NS AS NUTRI INTERV MEALS SNACK
Advance diet to consistent carbohydrate (with snack), DASH/TLC, low fiber. Encourage po intake of nutrient dense, carbohydrate-controlled meals and snacks.

## 2019-04-05 NOTE — DIETITIAN INITIAL EVALUATION ADULT. - PROBLEM SELECTOR PLAN 4
- glucose 399, Pt did not take His Insulin yesterday.  - evaluate for possible DKA  - continue Moderate ISS TID, and Low ISS at bedtime  - will hold Lantus for now  - f/u endo consult Dr Perlman

## 2019-04-05 NOTE — PROGRESS NOTE ADULT - PROBLEM SELECTOR PLAN 7
-hold home med statin for now, elevated liver enzymes  -f/u lipid panel -hold home med statin for now, elevated liver enzymes

## 2019-04-05 NOTE — PROGRESS NOTE ADULT - PROBLEM SELECTOR PLAN 2
-hx of GIB, reported brown colored emesis and dark color stools on admission  -FOBT positive  -plan for EGD tomorrow, NPO after midnight pt with H/o PUD & GI bleed  -trend H-H  -protonix 40 mg IV BID, Carafate QID  D/W DR Mroales. -hx of GIB, reported brown colored emesis and dark color stools on admission  -FOBT positive  -plan for EGD , NPO after midnight pt with H/o PUD & GI bleed  -trend H-H  -Protonix 40 mg IV BID, Carafate QID  D/W DR Morales.

## 2019-04-05 NOTE — PROGRESS NOTE ADULT - ASSESSMENT
Patient is a 63 y/o M with PMHx of Type 2 DM on insulin, HTN, HLD, obesity, nephrolithiasis, neuropathy, Hepatic encephalopathy, NAFLD, cirrhosis, Hyperammonemia,  Gastritis/ esophagitis, recent admission to [facility] on 12/2018 for syncope, UGIB s/p EGD, duodenal ulcer, s/p clips and cauterization, and e. coli bacteremia, presented to ED after 2 days of abdominal pain with nausea and vomiting, admit for evaluation of gastroenteritis and r/o DKA. Patient is a 61 y/o M with PMHx of Type 2 DM on insulin, HTN, HLD, obesity, nephrolithiasis, neuropathy, Hepatic encephalopathy, NAFLD, cirrhosis, Hyperammonemia,  Gastritis/ esophagitis, recent admission to [facility] on 12/2018 for syncope, UGIB s/p EGD, duodenal ulcer, s/p clips and cauterization, and e. coli bacteremia, presented to ED after 2 days of abdominal pain with nausea and vomiting, admit for evaluation of gastroenteritis

## 2019-04-05 NOTE — PROGRESS NOTE ADULT - PROBLEM SELECTOR PLAN 1
-hx of GIB s/p clips and cauterization, reported brown color emesis and dark color stool  -CT abdomen with PO and IV contrast showed PUD, cirrhosis, ?pancreatitis (neg amylase and lipase),  -IVF NS 75 cc/hr  -protonix, 40 mg PO BID  -Diet advanced to full liquid, NPO after midnight for EGD  -Zofran PRN for nausea  -Maalox q4 PRN dyspepsia -hx of GIB s/p clips and cauterization, reported brown color emesis and dark color stool  -CT abdomen with PO and IV contrast showed PUD, cirrhosis, ?pancreatitis (neg amylase and lipase),  -IVF NS 75 cc/hr  -Protonix, 40 mg IV BID  -Diet advanced to full liquid, NPO after midnight for EGD  -Zofran PRN for nausea  -Maalox q4 PRN dyspepsia

## 2019-04-05 NOTE — PROGRESS NOTE ADULT - PROBLEM SELECTOR PLAN 1
cont lantus 35 units qhs  cont mod dose humalog scale coverage  goal bg 100-180 in hosp setting  to add pre-meal humalog when po intake restarted

## 2019-04-06 DIAGNOSIS — K27.9 PEPTIC ULCER, SITE UNSPECIFIED, UNSPECIFIED AS ACUTE OR CHRONIC, WITHOUT HEMORRHAGE OR PERFORATION: ICD-10-CM

## 2019-04-06 LAB
ALBUMIN SERPL ELPH-MCNC: 2.2 G/DL — LOW (ref 3.3–5)
ALP SERPL-CCNC: 124 U/L — HIGH (ref 40–120)
ALT FLD-CCNC: 26 U/L — SIGNIFICANT CHANGE UP (ref 12–78)
AMMONIA BLD-MCNC: 69 UMOL/L — HIGH (ref 11–32)
ANION GAP SERPL CALC-SCNC: 8 MMOL/L — SIGNIFICANT CHANGE UP (ref 5–17)
AST SERPL-CCNC: 34 U/L — SIGNIFICANT CHANGE UP (ref 15–37)
BASOPHILS # BLD AUTO: 0.04 K/UL — SIGNIFICANT CHANGE UP (ref 0–0.2)
BASOPHILS NFR BLD AUTO: 0.8 % — SIGNIFICANT CHANGE UP (ref 0–2)
BILIRUB SERPL-MCNC: 3.6 MG/DL — HIGH (ref 0.2–1.2)
BUN SERPL-MCNC: 17 MG/DL — SIGNIFICANT CHANGE UP (ref 7–23)
CALCIUM SERPL-MCNC: 8.2 MG/DL — LOW (ref 8.5–10.1)
CHLORIDE SERPL-SCNC: 107 MMOL/L — SIGNIFICANT CHANGE UP (ref 96–108)
CO2 SERPL-SCNC: 28 MMOL/L — SIGNIFICANT CHANGE UP (ref 22–31)
CREAT SERPL-MCNC: 0.9 MG/DL — SIGNIFICANT CHANGE UP (ref 0.5–1.3)
EOSINOPHIL # BLD AUTO: 0.17 K/UL — SIGNIFICANT CHANGE UP (ref 0–0.5)
EOSINOPHIL NFR BLD AUTO: 3.3 % — SIGNIFICANT CHANGE UP (ref 0–6)
GLUCOSE SERPL-MCNC: 101 MG/DL — HIGH (ref 70–99)
HCT VFR BLD CALC: 32.5 % — LOW (ref 39–50)
HGB BLD-MCNC: 10.8 G/DL — LOW (ref 13–17)
IMM GRANULOCYTES NFR BLD AUTO: 0.4 % — SIGNIFICANT CHANGE UP (ref 0–1.5)
LYMPHOCYTES # BLD AUTO: 1.66 K/UL — SIGNIFICANT CHANGE UP (ref 1–3.3)
LYMPHOCYTES # BLD AUTO: 32 % — SIGNIFICANT CHANGE UP (ref 13–44)
MCHC RBC-ENTMCNC: 32.5 PG — SIGNIFICANT CHANGE UP (ref 27–34)
MCHC RBC-ENTMCNC: 33.2 GM/DL — SIGNIFICANT CHANGE UP (ref 32–36)
MCV RBC AUTO: 97.9 FL — SIGNIFICANT CHANGE UP (ref 80–100)
MONOCYTES # BLD AUTO: 0.63 K/UL — SIGNIFICANT CHANGE UP (ref 0–0.9)
MONOCYTES NFR BLD AUTO: 12.1 % — SIGNIFICANT CHANGE UP (ref 2–14)
NEUTROPHILS # BLD AUTO: 2.67 K/UL — SIGNIFICANT CHANGE UP (ref 1.8–7.4)
NEUTROPHILS NFR BLD AUTO: 51.4 % — SIGNIFICANT CHANGE UP (ref 43–77)
NRBC # BLD: 0 /100 WBCS — SIGNIFICANT CHANGE UP (ref 0–0)
PLATELET # BLD AUTO: 85 K/UL — LOW (ref 150–400)
POTASSIUM SERPL-MCNC: 3.2 MMOL/L — LOW (ref 3.5–5.3)
POTASSIUM SERPL-SCNC: 3.2 MMOL/L — LOW (ref 3.5–5.3)
PROT SERPL-MCNC: 5.5 G/DL — LOW (ref 6–8.3)
RBC # BLD: 3.32 M/UL — LOW (ref 4.2–5.8)
RBC # FLD: 18.1 % — HIGH (ref 10.3–14.5)
SODIUM SERPL-SCNC: 143 MMOL/L — SIGNIFICANT CHANGE UP (ref 135–145)
WBC # BLD: 5.19 K/UL — SIGNIFICANT CHANGE UP (ref 3.8–10.5)
WBC # FLD AUTO: 5.19 K/UL — SIGNIFICANT CHANGE UP (ref 3.8–10.5)

## 2019-04-06 PROCEDURE — 73030 X-RAY EXAM OF SHOULDER: CPT | Mod: 26,LT

## 2019-04-06 RX ORDER — POTASSIUM CHLORIDE 20 MEQ
40 PACKET (EA) ORAL EVERY 4 HOURS
Qty: 0 | Refills: 0 | Status: COMPLETED | OUTPATIENT
Start: 2019-04-06 | End: 2019-04-06

## 2019-04-06 RX ORDER — INSULIN GLARGINE 100 [IU]/ML
25 INJECTION, SOLUTION SUBCUTANEOUS AT BEDTIME
Qty: 0 | Refills: 0 | Status: DISCONTINUED | OUTPATIENT
Start: 2019-04-06 | End: 2019-04-08

## 2019-04-06 RX ADMIN — LACTULOSE 10 GRAM(S): 10 SOLUTION ORAL at 12:15

## 2019-04-06 RX ADMIN — GABAPENTIN 200 MILLIGRAM(S): 400 CAPSULE ORAL at 17:17

## 2019-04-06 RX ADMIN — Medication 1 GRAM(S): at 17:18

## 2019-04-06 RX ADMIN — GABAPENTIN 200 MILLIGRAM(S): 400 CAPSULE ORAL at 12:14

## 2019-04-06 RX ADMIN — Medication 40 MILLIEQUIVALENT(S): at 16:17

## 2019-04-06 RX ADMIN — INSULIN GLARGINE 25 UNIT(S): 100 INJECTION, SOLUTION SUBCUTANEOUS at 22:04

## 2019-04-06 RX ADMIN — LACTULOSE 10 GRAM(S): 10 SOLUTION ORAL at 17:17

## 2019-04-06 RX ADMIN — ONDANSETRON 4 MILLIGRAM(S): 8 TABLET, FILM COATED ORAL at 12:16

## 2019-04-06 RX ADMIN — PANTOPRAZOLE SODIUM 40 MILLIGRAM(S): 20 TABLET, DELAYED RELEASE ORAL at 05:48

## 2019-04-06 RX ADMIN — Medication 40 MILLIEQUIVALENT(S): at 12:13

## 2019-04-06 RX ADMIN — LIDOCAINE 1 PATCH: 4 CREAM TOPICAL at 07:10

## 2019-04-06 RX ADMIN — Medication 2000 UNIT(S): at 12:14

## 2019-04-06 RX ADMIN — PANTOPRAZOLE SODIUM 40 MILLIGRAM(S): 20 TABLET, DELAYED RELEASE ORAL at 17:18

## 2019-04-06 RX ADMIN — Medication 1 GRAM(S): at 12:16

## 2019-04-06 RX ADMIN — GABAPENTIN 200 MILLIGRAM(S): 400 CAPSULE ORAL at 05:48

## 2019-04-06 RX ADMIN — URSODIOL 300 MILLIGRAM(S): 250 TABLET, FILM COATED ORAL at 22:04

## 2019-04-06 RX ADMIN — LIDOCAINE 1 PATCH: 4 CREAM TOPICAL at 12:15

## 2019-04-06 RX ADMIN — ONDANSETRON 4 MILLIGRAM(S): 8 TABLET, FILM COATED ORAL at 17:16

## 2019-04-06 RX ADMIN — URSODIOL 300 MILLIGRAM(S): 250 TABLET, FILM COATED ORAL at 05:48

## 2019-04-06 RX ADMIN — LACTULOSE 10 GRAM(S): 10 SOLUTION ORAL at 05:47

## 2019-04-06 RX ADMIN — Medication 10 MILLIEQUIVALENT(S): at 12:16

## 2019-04-06 RX ADMIN — LIDOCAINE 1 PATCH: 4 CREAM TOPICAL at 19:13

## 2019-04-06 RX ADMIN — ONDANSETRON 4 MILLIGRAM(S): 8 TABLET, FILM COATED ORAL at 05:48

## 2019-04-06 RX ADMIN — URSODIOL 300 MILLIGRAM(S): 250 TABLET, FILM COATED ORAL at 16:18

## 2019-04-06 RX ADMIN — Medication 1 GRAM(S): at 05:48

## 2019-04-06 NOTE — PROGRESS NOTE ADULT - PROBLEM SELECTOR PLAN 4
-suspect JEAN, no ascites on CT- High Bili   -continue home med lactulose, ursodiol, rifaximin   -GI Consult: Dr. Franks  -ammonia mildly elevated 66 this AM, patient AOx3, monitor stool output

## 2019-04-06 NOTE — PROGRESS NOTE ADULT - SUBJECTIVE AND OBJECTIVE BOX
"Knee Exam      Patient: Judi Sy    YOB: 1947 70 y.o. female    Chief Complaints: knee feels \"a whole lot better\"    History of Present Illness: Follows up left knee medial femoral condyle sub-chondroplasty as well as arthroscopy with partial medial meniscectomy and tricompartmental abrasion chondroplasty from 3/9/18.  Last seen on 3/15/18 and since then she said her pain has continued to improve with only some mild occasional achiness in the knee.  She is now using a cane and feels that she is making progress with physical therapy.  No mechanical symptoms  HPI    ROS: knee pain, no fevers chills chest pain or shortness of breath  Past Medical History:   Diagnosis Date   • Aortic heart valve prolapse    • Arthritis    • High cholesterol    • History of atrial fibrillation    • Knee pain, left    • TIA (transient ischemic attack)    • Vasovagal syncope        Physical Exam:   Vitals:    03/29/18 1507   Temp: 98.9 °F (37.2 °C)   TempSrc: Temporal Artery    Weight: 80.3 kg (177 lb)   Height: 162.6 cm (64\")     Well developed with normal mood.She is with her .  Mild effusion to the left knee no warmth erythema she has nearly full extension and flexes to at least 100 205°.  Minimal if any discomfort about the knee and portals are now well-healed.  Left calf and thigh are nontender without sign of DVT      Radiology: 3 views left knee ordered to evaluate postoperative alignment reviewed with the patient and her  and compared with previous x-rays overall joint space appears to be well-maintained with some tricompartmental arthritic change.  Sub-chondroplasty matrix appears to be well contained do not see any evidence of collapse of the medial femoral condyle    Vitamin D 3-28-18 55.2      Assessment/Plan:  Status post left knee scope with partial medial meniscectomy, abrasion chondroplasty and medial femoral condylar subchondroplasty.    Overall she is very happy and seems to be " CAPILLARY BLOOD GLUCOSE      POCT Blood Glucose.: 148 mg/dL (06 Apr 2019 17:16)  POCT Blood Glucose.: 111 mg/dL (06 Apr 2019 11:57)  POCT Blood Glucose.: 89 mg/dL (06 Apr 2019 08:17)  POCT Blood Glucose.: 97 mg/dL (05 Apr 2019 21:31)      Vital Signs Last 24 Hrs  T(C): 36.9 (06 Apr 2019 14:33), Max: 36.9 (06 Apr 2019 14:33)  T(F): 98.4 (06 Apr 2019 14:33), Max: 98.4 (06 Apr 2019 14:33)  HR: 85 (06 Apr 2019 14:33) (70 - 88)  BP: 117/56 (06 Apr 2019 14:33) (117/56 - 152/78)  BP(mean): --  RR: 17 (06 Apr 2019 14:33) (17 - 17)  SpO2: 97% (06 Apr 2019 14:33) (96% - 99%)    General: WN/WD NAD  Respiratory: CTA B/L  CV: RRR, S1S2, no murmurs, rubs or gallops  Abdominal: Soft, NT, ND +BS, Last BM  Extremities: No edema, + peripheral pulses    Hemoglobin A1C, Whole Blood: 10.5 % (04-04 @ 11:17)   04-06    143  |  107  |  17  ----------------------------<  101<H>  3.2<L>   |  28  |  0.90    Ca    8.2<L>      06 Apr 2019 08:47    TPro  5.5<L>  /  Alb  2.2<L>  /  TBili  3.6<H>  /  DBili  x   /  AST  34  /  ALT  26  /  AlkPhos  124<H>  04-06      dextrose 40% Gel 15 Gram(s) Oral once PRN  dextrose 50% Injectable 12.5 Gram(s) IV Push once  dextrose 50% Injectable 25 Gram(s) IV Push once  dextrose 50% Injectable 25 Gram(s) IV Push once  glucagon  Injectable 1 milliGRAM(s) IntraMuscular once PRN  insulin glargine Injectable (LANTUS) 35 Unit(s) SubCutaneous at bedtime  insulin lispro (HumaLOG) corrective regimen sliding scale   SubCutaneous three times a day before meals  insulin lispro (HumaLOG) corrective regimen sliding scale   SubCutaneous at bedtime improving.  I recommended continue use of her cane until she increases in strength and will continue with physical therapy.    Regarding her vitamin D level it is improved and she will discontinue the D3 50,000 units weekly and begin taking 5000 units daily and understands to have her level rechecked in 4 weeks.    I will see her back in 6 weeks with x-rays of her left knee if she's having any pain

## 2019-04-06 NOTE — PROGRESS NOTE ADULT - SUBJECTIVE AND OBJECTIVE BOX
DEPARTMENT OF ANESTHESIA  POST ANESTHETIC EVALUATION    The Patient was interviewed and evaluated    Vital Signs Last 24 Hrs  T(C): 36.8 (06 Apr 2019 20:52), Max: 36.9 (06 Apr 2019 14:33)  T(F): 98.3 (06 Apr 2019 20:52), Max: 98.4 (06 Apr 2019 14:33)  HR: 88 (06 Apr 2019 20:52) (70 - 88)  BP: 129/69 (06 Apr 2019 20:52) (117/56 - 152/78)  BP(mean): --  RR: 17 (06 Apr 2019 20:52) (17 - 17)  SpO2: 98% (06 Apr 2019 20:52) (96% - 98%)    Evaluation:      (x ) No apparent complications or complaints regarding anesthesia care at this time  (x ) All questions were answered    Condition:  (x ) Stable      ( ) Guarded      ( ) Critical    Recommendations:  (x ) None     ( ) Other:

## 2019-04-06 NOTE — PROGRESS NOTE ADULT - PROBLEM SELECTOR PLAN 1
-hx of GIB s/p clips and cauterization, reported brown color emesis and dark color stool FOBT +  -EGD - large Duodenal ulcer with esophagitis .  MRI Abdo -+ Duodenitis, Mild pancreatitis & Cirrhosis & Portal HTN   -CT abdomen with PO and IV contrast showed PUD, cirrhosis, ?pancreatitis (neg amylase and lipase),  -IVF NS 75 cc/hr  -Protonix, 40 mg IV BID, ZOFRAN IV q 6 hrs & PRN Reglan  -Carafate 4x day  -Diet advanced to Regular  -Maalox q4 PRN dyspepsia

## 2019-04-06 NOTE — PROGRESS NOTE ADULT - SUBJECTIVE AND OBJECTIVE BOX
Patient is a 63y old  Male who presents with a chief complaint of n/v, abdominal pain (06 Apr 2019 18:22)      INTERVAL HPI:      OVERNIGHT EVENTS:    Home Medications:  aluminum hydroxide-magnesium hydroxide 200 mg-200 mg/5 mL oral suspension: 30 milliliter(s) orally 2 times a day (03 Apr 2019 11:43)  furosemide 20 mg oral tablet: 2 tab(s) orally once a day (03 Apr 2019 11:43)  insulin glargine: 50 unit(s) subcutaneous once a day (at bedtime) pt has pen (03 Apr 2019 11:43)  lactulose 10 g/15 mL oral syrup: 15 milliliter(s) orally 4 times a day (03 Apr 2019 11:43)  pantoprazole 40 mg oral delayed release tablet: 1 tab(s) orally once a day (03 Apr 2019 11:43)  phytonadione 100 mcg oral tablet: 1 tab(s) orally once a day (03 Apr 2019 11:43)  potassium chloride 10 mEq oral tablet, extended release: 1 tab(s) orally once a day (03 Apr 2019 11:43)  pravastatin 40 mg oral tablet: 1 tab(s) orally once a day (03 Apr 2019 11:43)  rifAXIMin 550 mg oral tablet: 1 tab(s) orally 2 times a day (03 Apr 2019 11:43)  Vitamin D3 2000 intl units oral tablet: 1 tab(s) orally once a day (03 Apr 2019 11:43)      MEDICATIONS  (STANDING):  cholecalciferol 2000 Unit(s) Oral daily  dextrose 5%. 1000 milliLiter(s) (50 mL/Hr) IV Continuous <Continuous>  dextrose 50% Injectable 12.5 Gram(s) IV Push once  dextrose 50% Injectable 25 Gram(s) IV Push once  dextrose 50% Injectable 25 Gram(s) IV Push once  gabapentin 200 milliGRAM(s) Oral four times a day  insulin glargine Injectable (LANTUS) 35 Unit(s) SubCutaneous at bedtime  insulin lispro (HumaLOG) corrective regimen sliding scale   SubCutaneous three times a day before meals  insulin lispro (HumaLOG) corrective regimen sliding scale   SubCutaneous at bedtime  lactulose Syrup 10 Gram(s) Oral four times a day  lidocaine   Patch 1 Patch Transdermal daily  ondansetron Injectable 4 milliGRAM(s) IV Push every 6 hours  pantoprazole  Injectable 40 milliGRAM(s) IV Push every 12 hours  potassium chloride    Tablet ER 10 milliEquivalent(s) Oral daily  rifaximin 550 milliGRAM(s) Oral two times a day  sodium chloride 0.45%. 1000 milliLiter(s) (75 mL/Hr) IV Continuous <Continuous>  sucralfate 1 Gram(s) Oral every 6 hours  ursodiol Capsule 300 milliGRAM(s) Oral three times a day    MEDICATIONS  (PRN):  acetaminophen   Tablet .. 650 milliGRAM(s) Oral every 6 hours PRN Mild Pain (1 - 3)  aluminum hydroxide/magnesium hydroxide/simethicone Suspension 30 milliLiter(s) Oral every 4 hours PRN Dyspepsia  dextrose 40% Gel 15 Gram(s) Oral once PRN Blood Glucose LESS THAN 70 milliGRAM(s)/deciliter  glucagon  Injectable 1 milliGRAM(s) IntraMuscular once PRN Glucose LESS THAN 70 milligrams/deciliter      Allergies    codeine (Anaphylaxis)    Intolerances        REVIEW OF SYSTEMS:  CONSTITUTIONAL: No fever, No chills, No fatigue, No myalgia, No Body ache, No Weakness  EYES: No eye pain,  No visual disturbances, No discharge, NO Redness  ENMT:  No ear pain, No nose bleed, No vertigo; No sinus or throat pain, No Congestion  NECK: No pain, No stiffness  RESPIRATORY: No cough, wheezing, No  hemoptysis, No shortness of breath  CARDIOVASCULAR: No chest pain, palpitations  GASTROINTESTINAL: No abdominal or epigastric pain. No nausea, No vomiting; No diarrhea or constipation. [  ] BM  GENITOURINARY: No dysuria, No frequency, No urgency, No hematuria, or incontinence  NEUROLOGICAL: No headaches, No dizziness, No numbness, No tingling, No tremors, No weakness  EXT: No Swelling, No Pain, No Edema  SKIN:  [  ] No itching, burning, rashes, or lesions   MUSCULOSKELETAL: No joint pain or swelling; No muscle pain, No back pain, No extremity pain  PSYCHIATRIC: No depression, anxiety, mood swings or difficulty sleeping at night  PAIN SCALE: [  ] None  [  ] Other-  ROS Unable to obtain due to - [  ] Dementia  [  ] Lethargy  [  ] Sedated [  ] non verbal  REST OF REVIEW Of SYSTEM - [  ] Normal     Vital Signs Last 24 Hrs  T(C): 36.9 (06 Apr 2019 14:33), Max: 36.9 (06 Apr 2019 14:33)  T(F): 98.4 (06 Apr 2019 14:33), Max: 98.4 (06 Apr 2019 14:33)  HR: 85 (06 Apr 2019 14:33) (70 - 88)  BP: 117/56 (06 Apr 2019 14:33) (117/56 - 152/78)  BP(mean): --  RR: 17 (06 Apr 2019 14:33) (17 - 17)  SpO2: 97% (06 Apr 2019 14:33) (96% - 99%)  Finger Stick          PHYSICAL EXAM:  GENERAL:  [  ] NAD , [  ] well appearing, [  ] Agitated, [  ] Drowsy,  [  ] Lethargy, [  ] confused   HEAD:  [  ] Normal, [  ] Other  EYES:  [  ] EOMI, [  ] PERRLA, [  ] conjunctiva and sclera clear normal, [  ] Other,  [  ] Pallor,[  ] Discharge  ENMT:  [  ] Normal, [  ] Moist mucous membranes, [  ] Good dentition, [  ] No Thrush  NECK:  [  ] Supple, [  ] No JVD, [  ] Normal thyroid, [  ] Lymphadenopathy [  ] Other  CHEST/LUNG:  [  ] Clear to auscultation bilaterally, [  ] Breath Sounds equal OR Decrease,  [  ] No rales, [  ] No rhonchi  [  ]  No wheezing  HEART:  [  ] Regular rate and rhythm, [  ] tachycardia, [  ] Bradycardia,  [  ] irregular  [  ] No murmurs, No rubs, No gallops, [  ] PPM in place (Mfr:  )  ABDOMEN:  [  ] Soft, [  ] Nontender, [  ] Nondistended, [  ] No mass, [  ] Bowel sounds present, [  ] obese  NERVOUS SYSTEM:  [  ] Alert & Oriented X3, [  ] Nonfocal  [  ] Confusion  [  ] Encephalopathic [  ] Sedated [  ] Unable to assess, [  ] Other-  EXTREMITIES: [  ] 2+ Peripheral Pulses, No clubbing, No cyanosis,  [  ] edema B/L lower EXT. [  ] PVD stasis skin changes B/L Lower EXT  LYMPH: No lymphadenopathy noted  SKIN:  [  ] No rashes or lesions, [  ] Pressure Ulcers, [  ] ecchymosis, [  ] Skin Tears, [  ] Other    DIET:     LABS:                        10.8   5.19  )-----------( 85       ( 06 Apr 2019 08:47 )             32.5     06 Apr 2019 08:47    143    |  107    |  17     ----------------------------<  101    3.2     |  28     |  0.90     Ca    8.2        06 Apr 2019 08:47    TPro  5.5    /  Alb  2.2    /  TBili  3.6    /  DBili  x      /  AST  34     /  ALT  26     /  AlkPhos  124    06 Apr 2019 08:47                             Anemia Panel:      Thyroid Panel:        Lipase, Serum: 52 U/L (04-03-19 @ 06:54)  Amylase, Serum Total: 26 U/L (04-03-19 @ 06:54)          RADIOLOGY & ADDITIONAL TESTS:< from: Xray Shoulder 2 Views, Left (04.06.19 @ 13:27) >    EXAM:  SHOULDER LEFT (MINIMUM 2 V)                            PROCEDURE DATE:  04/06/2019          INTERPRETATION:  Left shoulder pain.    3 views left shoulder.    Impression: No fracture dislocation. Degenerative subchondral cystic   change humeral head. Calcific bursitis/peritendinitis.          < end of copied text >  < from: MR Abdomen w/ IV Cont (04.05.19 @ 19:08) >      INTERPRETATION:  [facility] RADIOLOGIST PRELIMINARY REPORT    EXAM:    MR Abdomen Without and With Contrast; Liver     EXAM DATE/TIME:    4/5/2019 5:43 PM     CLINICAL HISTORY:    63 years old, male; Pain; Abdominal pain; Acute; Patient HX: R/O   malignanct,   pancreatitis. Large ulcer found on egd; Additional info: Machine issues   only   post contrast 30 sec delay was done .60,90,3 min were not able to   reconstruct     TECHNIQUE:    Imaging protocol: MR Abdomen with and without intravenous contrast. Exam   focused on the liver.    Contrast material: gadavist    Contrast volume: 14 ml    Contrast route: injector     COMPARISON:    CT ABDOMEN AND PELVIS WITH ORAL CONTRAST WITH IV CONTRAST 4/3/2019 9:27   AM     FINDINGS:    Liver:  No suspicious arterial enhancing hepatic mass.    Gallbladder and bile ducts:  Mild common duct dilation measures 1 cm. No   choledocholithiasis.    Spleen:  Spleen is unremarkable.    Kidneys and ureters:  The kidneys are unremarkable.    Stomach and bowel:  No bowel dilation.    Intraperitoneal space:  Cirrhosis with trace perihepatic ascites.    Bones/joints:  Degenerative glands are unremarkable.    Other findings:  No abdominal lymphadenopathy. No abdominal aortic   aneurysm.     IMPRESSION:   Cirrhosis with trace perihepatic ascites. No acute findings.     History: Ulcer found on EGD.    MRCP without contrast and contrast-enhanced abdominal MR, multisequence.   PriorCT 4/3/2019.      Gallbladder surgically absent. Common duct dilatation up to a 9 mm   attributed to postcholecystectomy reservoir effect. No common duct stone   or stricture. No intrahepatic biliary dilatation. The liver appears   cirrhotic without focal lesions. Spleen unremarkable.  Trace peripancreatic fluid may right reflect mild pancreatitis. Correlate   with lipase, amylase levels. No pancreatic ductal dilatation abscess or   pseudocyst. No pancreatic necrosis.  Trace abdominal ascites. Mild anasarca. Trace basilar effusions.  Splenic portal veins are patent. Prominent portosystemic varices present.  The stomach not adequately distended. There is  thickening of the   duodenal sweep consistent with duodenitis. Cannot exclude underlying   ulcer disease.    Impression:    Cirrhosis with prominent portosystemic varices suggesting portal   hypertension.  Possible mild uncomplicated pancreatitis. Recommend enzyme correlation.  Duodenitis.  Trace ascites.        < end of copied text >        HEALTH ISSUES - PROBLEM Dx:  Anemia: Anemia  Diabetes mellitus type 2, uncontrolled: Diabetes mellitus type 2, uncontrolled  Need for prophylactic measure: Need for prophylactic measure  Hypercholesteremia: Hypercholesteremia  Hypertension: Hypertension  Cirrhosis: Cirrhosis  GIB (gastrointestinal bleeding): GIB (gastrointestinal bleeding)  Diabetes: Diabetes  Elevated glucose: Elevated glucose  Lactic acidosis: Lactic acidosis  Hepatic encephalopathy: Hepatic encephalopathy  Abdominal pain: Abdominal pain  Gastroenteritis: Gastroenteritis      Consultant(s) Notes Reviewed:  [x  ] YES     Care Discussed with [X] Consultants  [ x ] Patient  [  ] Family  [  ]   [  ] Social Service  [ x ] RN, [  ] Physical Therapy  DVT PPX: [  ] Lovenox, [  ] S C Heparin, [  ] Coumadin, [  ] Xarelto, [  ] Eliquis, [  ] Pradaxa, [  ] IV Heparin drip, [x  ] SCD [  ] Contraindication 2 to GI Bleed,[  ] Ambulation  Advanced directive: [ x ] None, [  ] DNR/DNI Patient is a 63y old  Male who presents with a chief complaint of n/v, abdominal pain (06 Apr 2019 18:22)      INTERVAL HPI:  Patient is a 63 y/o M with PMHx of Type 2 DM on insulin, HTN, HLD, obesity, nephrolithiasis, neuropathy, Hepatic encephalopathy, NAFLD, cirrhosis, Hyperammonemia,  Gastritis/ esophagitis, recent admission to [facility] on 12/2018 for syncope, UGIB s/p EGD, duodenal ulcer, s/p clips and cauterization, and e. coli bacteremia, presented to ED after 2 days of abdominal pain with nausea and vomiting. Reported multiple episodes of brown colored emesis. Patient also reported recent BMs that are dark in color, no diarrhea. Patient stated that he f/u with GI outpatient on Monday without any significant concerns. Patient also reported his son at home is also sick at home recently with similar symptoms of nausea, vomiting, and abdominal pain. Patient denies any headache, dizziness, visual changes, chest pain, sob, gamez, diarrhea, dysuria, or hematuria.  As per pt he has NOT taken his insulin on Tuesday as he was vomiting,    In the ED, initially VSS, most recent vs showed tachycardia 113bpm, and 150/78. Patient on room air  WBC wnl, h/h 12.6/36/8, plt 125  Initial lactate 7.2 --> 4.8 s/p 2 IVF bolus  Na 141, K 4.5, bicarb 23, anion gap serum 14, BUN 37, Cr 1.3, Glucose 399  albumin 2.4, Tbili 5, alk phos 273, Ast 42, GFR 58  lipase 52, amylase 26, serum acetone negative  UA; significant for RBC 25-50, WBC 6-10, protein 25, small ketones, moderate blood, granular cast, 1000 glucose  CT abdomen and pelvis w/ PO and IV contrast: Cirrhosis and portal hypertension. Mild, nonspecific haziness at the root of the small bowel mesentery; correlate clinically for pancreatitis. Pyloric and duodenal bulb thickening, recommend further clinical correlation for peptic ulcer disease.   EKG showed sinus tachy  Received 2x IVF bolus and 1x Zofran in ED.   4/5 /19 :Pt seen, examined, no acute events overnight, patient complains of GERD like sxs. FOBT + S/P EGD -Large non healing ulcer, No bleeding , MRI Abdomen today  4/6/19: Pt seen, Examined ,C/O Burning pain in esophagus & Stomach, on PO Diet, PPI. MRI Abdomen shows -Duodenitis    OVERNIGHT EVENTS: NONE    Home Medications:  aluminum hydroxide-magnesium hydroxide 200 mg-200 mg/5 mL oral suspension: 30 milliliter(s) orally 2 times a day (03 Apr 2019 11:43)  furosemide 20 mg oral tablet: 2 tab(s) orally once a day (03 Apr 2019 11:43)  insulin glargine: 50 unit(s) subcutaneous once a day (at bedtime) pt has pen (03 Apr 2019 11:43)  lactulose 10 g/15 mL oral syrup: 15 milliliter(s) orally 4 times a day (03 Apr 2019 11:43)  pantoprazole 40 mg oral delayed release tablet: 1 tab(s) orally once a day (03 Apr 2019 11:43)  phytonadione 100 mcg oral tablet: 1 tab(s) orally once a day (03 Apr 2019 11:43)  potassium chloride 10 mEq oral tablet, extended release: 1 tab(s) orally once a day (03 Apr 2019 11:43)  pravastatin 40 mg oral tablet: 1 tab(s) orally once a day (03 Apr 2019 11:43)  rifAXIMin 550 mg oral tablet: 1 tab(s) orally 2 times a day (03 Apr 2019 11:43)  Vitamin D3 2000 intl units oral tablet: 1 tab(s) orally once a day (03 Apr 2019 11:43)      MEDICATIONS  (STANDING):  cholecalciferol 2000 Unit(s) Oral daily  dextrose 5%. 1000 milliLiter(s) (50 mL/Hr) IV Continuous <Continuous>  dextrose 50% Injectable 12.5 Gram(s) IV Push once  dextrose 50% Injectable 25 Gram(s) IV Push once  dextrose 50% Injectable 25 Gram(s) IV Push once  gabapentin 200 milliGRAM(s) Oral four times a day  insulin glargine Injectable (LANTUS) 35 Unit(s) SubCutaneous at bedtime  insulin lispro (HumaLOG) corrective regimen sliding scale   SubCutaneous three times a day before meals  insulin lispro (HumaLOG) corrective regimen sliding scale   SubCutaneous at bedtime  lactulose Syrup 10 Gram(s) Oral four times a day  lidocaine   Patch 1 Patch Transdermal daily  ondansetron Injectable 4 milliGRAM(s) IV Push every 6 hours  pantoprazole  Injectable 40 milliGRAM(s) IV Push every 12 hours  potassium chloride    Tablet ER 10 milliEquivalent(s) Oral daily  rifaximin 550 milliGRAM(s) Oral two times a day  sodium chloride 0.45%. 1000 milliLiter(s) (75 mL/Hr) IV Continuous <Continuous>  sucralfate 1 Gram(s) Oral every 6 hours  ursodiol Capsule 300 milliGRAM(s) Oral three times a day    MEDICATIONS  (PRN):  acetaminophen   Tablet .. 650 milliGRAM(s) Oral every 6 hours PRN Mild Pain (1 - 3)  aluminum hydroxide/magnesium hydroxide/simethicone Suspension 30 milliLiter(s) Oral every 4 hours PRN Dyspepsia  dextrose 40% Gel 15 Gram(s) Oral once PRN Blood Glucose LESS THAN 70 milliGRAM(s)/deciliter  glucagon  Injectable 1 milliGRAM(s) IntraMuscular once PRN Glucose LESS THAN 70 milligrams/deciliter      Allergies    codeine (Anaphylaxis)    Intolerances    REVIEW OF SYSTEMS:  CONSTITUTIONAL: No fever, No chills, No fatigue, No myalgia, No Body ache, No Weakness  EYES: No eye pain,  No visual disturbances, No discharge, NO Redness  ENMT:  No ear pain, No nose bleed, No vertigo; No sinus or throat pain, No Congestion  NECK: No pain, No stiffness  RESPIRATORY: No cough, wheezing, No  hemoptysis, No shortness of breath  CARDIOVASCULAR: No chest pain, palpitations  GASTROINTESTINAL: No abdominal or epigastric pain. No nausea, No vomiting; No diarrhea or constipation. [  ] BM  GENITOURINARY: No dysuria, No frequency, No urgency, No hematuria, or incontinence  NEUROLOGICAL: No headaches, No dizziness, No numbness, No tingling, No tremors, No weakness  EXT: No Swelling, No Pain, No Edema  SKIN:  [ x ] No itching, burning, rashes, or lesions   MUSCULOSKELETAL: No joint pain or swelling; No muscle pain, No back pain, No extremity pain  PSYCHIATRIC: No depression, anxiety, mood swings or difficulty sleeping at night  PAIN SCALE: [  ] None  [x  ] Other-Left shoulder pain  ROS Unable to obtain due to - [  ] Dementia  [  ] Lethargy  [  ] Sedated [  ] non verbal  REST OF REVIEW Of SYSTEM - [x ] Normal     Vital Signs Last 24 Hrs  T(C): 36.9 (06 Apr 2019 14:33), Max: 36.9 (06 Apr 2019 14:33)  T(F): 98.4 (06 Apr 2019 14:33), Max: 98.4 (06 Apr 2019 14:33)  HR: 85 (06 Apr 2019 14:33) (70 - 88)  BP: 117/56 (06 Apr 2019 14:33) (117/56 - 152/78)  BP(mean): --  RR: 17 (06 Apr 2019 14:33) (17 - 17)  SpO2: 97% (06 Apr 2019 14:33) (96% - 99%)  Finger Stick    PHYSICAL EXAM:  GENERAL:  [ x ] NAD , [ x ] well appearing, [  ] Agitated, [  ] Drowsy,  [  ] Lethargy, [  ] confused   HEAD:  [ x ] Normal, [  ] Other  EYES:  [ x ] EOMI, [  x] PERRLA, [ x ] conjunctiva and sclera clear normal, [  ] Other,  [  ] Pallor,[  ] Discharge  ENMT:  [ x ] Normal, [x] Moist mucous membranes, [ x ] Good dentition, [ x ] No Thrush  NECK:  [ x ] Supple, [ x ] No JVD, [  ] Normal thyroid, [  ] Lymphadenopathy [  ] Other  CHEST/LUNG:  [ x ] Clear to auscultation bilaterally, [ x ] Breath Sounds equal  [ x ] No rales, [x  ] No rhonchi  [ x ]  No wheezing  HEART:  [ x ] Regular rate and rhythm, [  ] tachycardia, [  ] Bradycardia,  [  ] irregular  [ x ] No murmurs, No rubs, No gallops, [  ] PPM in place (Mfr:  )  ABDOMEN:  [x  ] Soft, [ x ] Nontender, [ x ] Nondistended, [ x ] No mass, [ x ] Bowel sounds present, [ x ] obese  NERVOUS SYSTEM:  [ x] Alert & Oriented X3, [ x ] Nonfocal  [  ] Confusion  [  ] Encephalopathic [  ] Sedated [  ] Unable to assess, [  ] Other-  EXTREMITIES: [ x ] 2+ Peripheral Pulses, No clubbing, No cyanosis,  [  ] edema B/L lower EXT. [  ] PVD stasis skin changes B/L Lower EXT  LYMPH: No lymphadenopathy noted  SKIN:  [ x ] No rashes or lesions, [  ] Pressure Ulcers, [  ] ecchymosis, [  ] Skin Tears, [  ] Other    DIET: DASH,DM    LABS:                        10.8   5.19  )-----------( 85       ( 06 Apr 2019 08:47 )             32.5     06 Apr 2019 08:47    143    |  107    |  17     ----------------------------<  101    3.2     |  28     |  0.90     Ca    8.2        06 Apr 2019 08:47    TPro  5.5    /  Alb  2.2    /  TBili  3.6    /  DBili  x      /  AST  34     /  ALT  26     /  AlkPhos  124    06 Apr 2019 08:47      Lipase, Serum: 52 U/L (04-03-19 @ 06:54)  Amylase, Serum Total: 26 U/L (04-03-19 @ 06:54)      RADIOLOGY & ADDITIONAL TESTS:< from: Xray Shoulder 2 Views, Left (04.06.19 @ 13:27) >    EXAM:  SHOULDER LEFT (MINIMUM 2 V)                            PROCEDURE DATE:  04/06/2019          INTERPRETATION:  Left shoulder pain.    3 views left shoulder.    Impression: No fracture dislocation. Degenerative subchondral cystic   change humeral head. Calcific bursitis/peritendinitis.      < end of copied text >  < from: MR Abdomen w/ IV Cont (04.05.19 @ 19:08) >      INTERPRETATION:  [facility] RADIOLOGIST PRELIMINARY REPORT    EXAM:    MR Abdomen Without and With Contrast; Liver     EXAM DATE/TIME:    4/5/2019 5:43 PM     CLINICAL HISTORY:    63 years old, male; Pain; Abdominal pain; Acute; Patient HX: R/O   malignanct,   pancreatitis. Large ulcer found on egd; Additional info: Machine issues   only   post contrast 30 sec delay was done .60,90,3 min were not able to   reconstruct     TECHNIQUE:    Imaging protocol: MR Abdomen with and without intravenous contrast. Exam   focused on the liver.    Contrast material: gadavist    Contrast volume: 14 ml    Contrast route: injector     COMPARISON:    CT ABDOMEN AND PELVIS WITH ORAL CONTRAST WITH IV CONTRAST 4/3/2019 9:27   AM     FINDINGS:    Liver:  No suspicious arterial enhancing hepatic mass.    Gallbladder and bile ducts:  Mild common duct dilation measures 1 cm. No   choledocholithiasis.    Spleen:  Spleen is unremarkable.    Kidneys and ureters:  The kidneys are unremarkable.    Stomach and bowel:  No bowel dilation.    Intraperitoneal space:  Cirrhosis with trace perihepatic ascites.    Bones/joints:  Degenerative glands are unremarkable.    Other findings:  No abdominal lymphadenopathy. No abdominal aortic   aneurysm.     IMPRESSION:   Cirrhosis with trace perihepatic ascites. No acute findings.     History: Ulcer found on EGD.    MRCP without contrast and contrast-enhanced abdominal MR, multisequence.   PriorCT 4/3/2019.      Gallbladder surgically absent. Common duct dilatation up to a 9 mm   attributed to postcholecystectomy reservoir effect. No common duct stone   or stricture. No intrahepatic biliary dilatation. The liver appears   cirrhotic without focal lesions. Spleen unremarkable.  Trace peripancreatic fluid may right reflect mild pancreatitis. Correlate   with lipase, amylase levels. No pancreatic ductal dilatation abscess or   pseudocyst. No pancreatic necrosis.  Trace abdominal ascites. Mild anasarca. Trace basilar effusions.  Splenic portal veins are patent. Prominent portosystemic varices present.  The stomach not adequately distended. There is  thickening of the   duodenal sweep consistent with duodenitis. Cannot exclude underlying   ulcer disease.    Impression:    Cirrhosis with prominent portosystemic varices suggesting portal   hypertension.  Possible mild uncomplicated pancreatitis. Recommend enzyme correlation.  Duodenitis.  Trace ascites.    < end of copied text >        HEALTH ISSUES - PROBLEM Dx:  Anemia: Anemia  Diabetes mellitus type 2, uncontrolled: Diabetes mellitus type 2, uncontrolled  Need for prophylactic measure: Need for prophylactic measure  Hypercholesteremia: Hypercholesteremia  Hypertension: Hypertension  Cirrhosis: Cirrhosis  GIB (gastrointestinal bleeding): GIB (gastrointestinal bleeding)  Diabetes: Diabetes  Elevated glucose: Elevated glucose  Lactic acidosis: Lactic acidosis  Hepatic encephalopathy: Hepatic encephalopathy  Abdominal pain: Abdominal pain  Gastroenteritis: Gastroenteritis      Consultant(s) Notes Reviewed:  [x  ] YES     Care Discussed with [X] Consultants  [ x ] Patient  [  ] Family  [  ]   [  ] Social Service  [ x ] RN, [  ] Physical Therapy  DVT PPX: [  ] Lovenox, [  ] S C Heparin, [  ] Coumadin, [  ] Xarelto, [  ] Eliquis, [  ] Pradaxa, [  ] IV Heparin drip, [x  ] SCD [  ] Contraindication 2 to GI Bleed,[  ] Ambulation  Advanced directive: [ x ] None, [  ] DNR/DNI

## 2019-04-06 NOTE — PROGRESS NOTE ADULT - ASSESSMENT
Patient is a 61 y/o M with PMHx of Type 2 DM on insulin, HTN, HLD, obesity, nephrolithiasis, neuropathy, Hepatic encephalopathy, NAFLD, cirrhosis, Hyperammonemia,  Gastritis/ esophagitis, recent admission to [facility] on 12/2018 for syncope, UGIB s/p EGD, duodenal ulcer, s/p clips and cauterization, and e. coli bacteremia, presented to ED after 2 days of abdominal pain with nausea and vomiting, admit for evaluation of abdominal pain

## 2019-04-06 NOTE — PROGRESS NOTE ADULT - PROBLEM SELECTOR PLAN 3
-hyperglycemic on admission, patient appears to be noncompliant with medications  -continue moderate dose ISS  -increase Lantus to 35 units qhs  -Endocrine Consult: Dr. Perlman  -CCarb  -hypoglycemic protocol   -gabapentin 200 mg qid  -A1c 10.5

## 2019-04-06 NOTE — PROGRESS NOTE ADULT - PROBLEM SELECTOR PLAN 3
suspected JEAN  no ascites on CT  cont ppi, rifaximin, sandra, lactulose  trend lfts  avoid hepatotoxins  low na/pro diet when advanced  supportive care  will need eus to be done as an outpt to r/o carcinoma

## 2019-04-06 NOTE — PROGRESS NOTE ADULT - SUBJECTIVE AND OBJECTIVE BOX
INTERVAL HPI/OVERNIGHT EVENTS:  No new overnight event.  No N/V/D.  Tolerating diet.    Allergies    codeine (Anaphylaxis)    Intolerances    General:  No wt loss, fevers, chills, night sweats, fatigue,   Eyes:  Good vision, no reported pain  ENT:  No sore throat, pain, runny nose, dysphagia  CV:  No pain, palpitations, hypo/hypertension  Resp:  No dyspnea, cough, tachypnea, wheezing  GI:  No pain, No nausea, No vomiting, No diarrhea, No constipation, No weight loss, No fever, No pruritis, No rectal bleeding, No tarry stools, No dysphagia,  :  No pain, bleeding, incontinence, nocturia  Muscle:  No pain, weakness  Neuro:  No weakness, tingling, memory problems  Psych:  No fatigue, insomnia, mood problems, depression  Endocrine:  No polyuria, polydipsia, cold/heat intolerance  Heme:  No petechiae, ecchymosis, easy bruisability  Skin:  No rash, tattoos, scars, edema      PHYSICAL EXAM:   Vital Signs:  Vital Signs Last 24 Hrs  T(C): 36.9 (06 Apr 2019 14:33), Max: 36.9 (06 Apr 2019 14:33)  T(F): 98.4 (06 Apr 2019 14:33), Max: 98.4 (06 Apr 2019 14:33)  HR: 85 (06 Apr 2019 14:33) (70 - 88)  BP: 117/56 (06 Apr 2019 14:33) (117/56 - 152/78)  BP(mean): --  RR: 17 (06 Apr 2019 14:33) (17 - 17)  SpO2: 97% (06 Apr 2019 14:33) (96% - 99%)  Daily     Daily I&O's Summary      GENERAL:  Appears stated age, well-groomed, well-nourished, no distress  HEENT:  NC/AT,  conjunctivae clear and pink, no thyromegaly, nodules, adenopathy, no JVD, sclera -anicteric  CHEST:  Full & symmetric excursion, no increased effort, breath sounds clear  HEART:  Regular rhythm, S1, S2, no murmur/rub/S3/S4, no abdominal bruit, no edema  ABDOMEN:  Soft, non-tender, non-distended, normoactive bowel sounds,  no masses ,no hepato-splenomegaly, no signs of chronic liver disease  EXTEREMITIES:  no cyanosis,clubbing or edema  SKIN:  No rash/erythema/ecchymoses/petechiae/wounds/abscess/warm/dry  NEURO:  Alert, oriented, no asterixis, no tremor, no encephalopathy      LABS:                        10.8   5.19  )-----------( 85       ( 06 Apr 2019 08:47 )             32.5     04-06    143  |  107  |  17  ----------------------------<  101<H>  3.2<L>   |  28  |  0.90    Ca    8.2<L>      06 Apr 2019 08:47    TPro  5.5<L>  /  Alb  2.2<L>  /  TBili  3.6<H>  /  DBili  x   /  AST  34  /  ALT  26  /  AlkPhos  124<H>  04-06        amylaseAmylase, Serum Total: 26 U/L (04-03 @ 06:54)     lipaseLipase, Serum: 52 U/L (04-03 @ 06:54)    RADIOLOGY & ADDITIONAL TESTS:

## 2019-04-06 NOTE — PROGRESS NOTE ADULT - PROBLEM SELECTOR PLAN 2
-hx of GIB, reported brown colored emesis and dark color stools on admission  -FOBT positive  -S/P  EGD + Duodenal ulcer , H/o PUD & GI bleed  -trend H-H  -Protonix 40 mg IV BID, Carafate QID  D/W DR Morales.

## 2019-04-07 LAB
ALBUMIN SERPL ELPH-MCNC: 2.3 G/DL — LOW (ref 3.3–5)
ALP SERPL-CCNC: 140 U/L — HIGH (ref 40–120)
ALT FLD-CCNC: 24 U/L — SIGNIFICANT CHANGE UP (ref 12–78)
ANION GAP SERPL CALC-SCNC: 7 MMOL/L — SIGNIFICANT CHANGE UP (ref 5–17)
AST SERPL-CCNC: 32 U/L — SIGNIFICANT CHANGE UP (ref 15–37)
BILIRUB SERPL-MCNC: 3.1 MG/DL — HIGH (ref 0.2–1.2)
BUN SERPL-MCNC: 14 MG/DL — SIGNIFICANT CHANGE UP (ref 7–23)
CALCIUM SERPL-MCNC: 8.2 MG/DL — LOW (ref 8.5–10.1)
CHLORIDE SERPL-SCNC: 110 MMOL/L — HIGH (ref 96–108)
CO2 SERPL-SCNC: 26 MMOL/L — SIGNIFICANT CHANGE UP (ref 22–31)
CREAT SERPL-MCNC: 0.82 MG/DL — SIGNIFICANT CHANGE UP (ref 0.5–1.3)
GLUCOSE SERPL-MCNC: 85 MG/DL — SIGNIFICANT CHANGE UP (ref 70–99)
HCT VFR BLD CALC: 34.3 % — LOW (ref 39–50)
HGB BLD-MCNC: 11.7 G/DL — LOW (ref 13–17)
MCHC RBC-ENTMCNC: 33 PG — SIGNIFICANT CHANGE UP (ref 27–34)
MCHC RBC-ENTMCNC: 34.1 GM/DL — SIGNIFICANT CHANGE UP (ref 32–36)
MCV RBC AUTO: 96.6 FL — SIGNIFICANT CHANGE UP (ref 80–100)
NRBC # BLD: 0 /100 WBCS — SIGNIFICANT CHANGE UP (ref 0–0)
PLATELET # BLD AUTO: 96 K/UL — LOW (ref 150–400)
POTASSIUM SERPL-MCNC: 3.5 MMOL/L — SIGNIFICANT CHANGE UP (ref 3.5–5.3)
POTASSIUM SERPL-SCNC: 3.5 MMOL/L — SIGNIFICANT CHANGE UP (ref 3.5–5.3)
PROT SERPL-MCNC: 5.8 G/DL — LOW (ref 6–8.3)
RBC # BLD: 3.55 M/UL — LOW (ref 4.2–5.8)
RBC # FLD: 17.9 % — HIGH (ref 10.3–14.5)
SODIUM SERPL-SCNC: 143 MMOL/L — SIGNIFICANT CHANGE UP (ref 135–145)
WBC # BLD: 8.02 K/UL — SIGNIFICANT CHANGE UP (ref 3.8–10.5)
WBC # FLD AUTO: 8.02 K/UL — SIGNIFICANT CHANGE UP (ref 3.8–10.5)

## 2019-04-07 RX ORDER — POTASSIUM CHLORIDE 20 MEQ
40 PACKET (EA) ORAL ONCE
Qty: 0 | Refills: 0 | Status: COMPLETED | OUTPATIENT
Start: 2019-04-07 | End: 2019-04-07

## 2019-04-07 RX ORDER — DIPHENHYDRAMINE HYDROCHLORIDE AND LIDOCAINE HYDROCHLORIDE AND ALUMINUM HYDROXIDE AND MAGNESIUM HYDRO
30 KIT EVERY 4 HOURS
Qty: 0 | Refills: 0 | Status: DISCONTINUED | OUTPATIENT
Start: 2019-04-07 | End: 2019-04-08

## 2019-04-07 RX ADMIN — Medication 1 GRAM(S): at 17:19

## 2019-04-07 RX ADMIN — Medication 1 GRAM(S): at 00:17

## 2019-04-07 RX ADMIN — LIDOCAINE 1 PATCH: 4 CREAM TOPICAL at 00:54

## 2019-04-07 RX ADMIN — LACTULOSE 10 GRAM(S): 10 SOLUTION ORAL at 11:53

## 2019-04-07 RX ADMIN — LIDOCAINE 1 PATCH: 4 CREAM TOPICAL at 11:53

## 2019-04-07 RX ADMIN — Medication 1 GRAM(S): at 07:49

## 2019-04-07 RX ADMIN — LACTULOSE 10 GRAM(S): 10 SOLUTION ORAL at 23:43

## 2019-04-07 RX ADMIN — URSODIOL 300 MILLIGRAM(S): 250 TABLET, FILM COATED ORAL at 14:26

## 2019-04-07 RX ADMIN — INSULIN GLARGINE 25 UNIT(S): 100 INJECTION, SOLUTION SUBCUTANEOUS at 22:03

## 2019-04-07 RX ADMIN — ONDANSETRON 4 MILLIGRAM(S): 8 TABLET, FILM COATED ORAL at 17:19

## 2019-04-07 RX ADMIN — LACTULOSE 10 GRAM(S): 10 SOLUTION ORAL at 17:19

## 2019-04-07 RX ADMIN — Medication 10 MILLIEQUIVALENT(S): at 11:53

## 2019-04-07 RX ADMIN — LIDOCAINE 1 PATCH: 4 CREAM TOPICAL at 21:54

## 2019-04-07 RX ADMIN — DIPHENHYDRAMINE HYDROCHLORIDE AND LIDOCAINE HYDROCHLORIDE AND ALUMINUM HYDROXIDE AND MAGNESIUM HYDRO 30 MILLILITER(S): KIT at 22:03

## 2019-04-07 RX ADMIN — Medication 40 MILLIEQUIVALENT(S): at 11:52

## 2019-04-07 RX ADMIN — Medication 1 GRAM(S): at 11:54

## 2019-04-07 RX ADMIN — GABAPENTIN 200 MILLIGRAM(S): 400 CAPSULE ORAL at 00:16

## 2019-04-07 RX ADMIN — PANTOPRAZOLE SODIUM 40 MILLIGRAM(S): 20 TABLET, DELAYED RELEASE ORAL at 17:19

## 2019-04-07 RX ADMIN — ONDANSETRON 4 MILLIGRAM(S): 8 TABLET, FILM COATED ORAL at 07:50

## 2019-04-07 RX ADMIN — LIDOCAINE 1 PATCH: 4 CREAM TOPICAL at 23:49

## 2019-04-07 RX ADMIN — GABAPENTIN 200 MILLIGRAM(S): 400 CAPSULE ORAL at 11:54

## 2019-04-07 RX ADMIN — ONDANSETRON 4 MILLIGRAM(S): 8 TABLET, FILM COATED ORAL at 11:54

## 2019-04-07 RX ADMIN — Medication 2000 UNIT(S): at 11:55

## 2019-04-07 RX ADMIN — Medication 1 GRAM(S): at 23:43

## 2019-04-07 RX ADMIN — GABAPENTIN 200 MILLIGRAM(S): 400 CAPSULE ORAL at 23:43

## 2019-04-07 RX ADMIN — GABAPENTIN 200 MILLIGRAM(S): 400 CAPSULE ORAL at 17:20

## 2019-04-07 RX ADMIN — PANTOPRAZOLE SODIUM 40 MILLIGRAM(S): 20 TABLET, DELAYED RELEASE ORAL at 07:49

## 2019-04-07 RX ADMIN — DIPHENHYDRAMINE HYDROCHLORIDE AND LIDOCAINE HYDROCHLORIDE AND ALUMINUM HYDROXIDE AND MAGNESIUM HYDRO 30 MILLILITER(S): KIT at 18:36

## 2019-04-07 RX ADMIN — URSODIOL 300 MILLIGRAM(S): 250 TABLET, FILM COATED ORAL at 07:49

## 2019-04-07 RX ADMIN — GABAPENTIN 200 MILLIGRAM(S): 400 CAPSULE ORAL at 07:49

## 2019-04-07 RX ADMIN — LACTULOSE 10 GRAM(S): 10 SOLUTION ORAL at 07:50

## 2019-04-07 RX ADMIN — ONDANSETRON 4 MILLIGRAM(S): 8 TABLET, FILM COATED ORAL at 00:16

## 2019-04-07 RX ADMIN — LACTULOSE 10 GRAM(S): 10 SOLUTION ORAL at 00:17

## 2019-04-07 RX ADMIN — URSODIOL 300 MILLIGRAM(S): 250 TABLET, FILM COATED ORAL at 22:03

## 2019-04-07 RX ADMIN — ONDANSETRON 4 MILLIGRAM(S): 8 TABLET, FILM COATED ORAL at 23:43

## 2019-04-07 NOTE — PROGRESS NOTE ADULT - PROBLEM SELECTOR PLAN 1
-hx of GIB s/p clips and cauterization, reported brown color emesis and dark color stool FOBT +  -EGD - large Duodenal ulcer with esophagitis .  MRI Abdo -+ Duodenitis, Mild pancreatitis & Cirrhosis & Portal HTN   -Protonix, 40 mg  BID, ZOFRAN IV q 6 hrs & PRN Reglan  -Carafate 4x day  -Diet advanced to Regular  -Maalox q4 PRN dyspepsia

## 2019-04-07 NOTE — PROGRESS NOTE ADULT - SUBJECTIVE AND OBJECTIVE BOX
Patient is a 63y old  Male who presents with a chief complaint of n/v, abdominal pain (06 Apr 2019 22:35)      INTERVAL HPI:      OVERNIGHT EVENTS:    Home Medications:  aluminum hydroxide-magnesium hydroxide 200 mg-200 mg/5 mL oral suspension: 30 milliliter(s) orally 2 times a day (03 Apr 2019 11:43)  furosemide 20 mg oral tablet: 2 tab(s) orally once a day (03 Apr 2019 11:43)  insulin glargine: 50 unit(s) subcutaneous once a day (at bedtime) pt has pen (03 Apr 2019 11:43)  lactulose 10 g/15 mL oral syrup: 15 milliliter(s) orally 4 times a day (03 Apr 2019 11:43)  pantoprazole 40 mg oral delayed release tablet: 1 tab(s) orally once a day (03 Apr 2019 11:43)  phytonadione 100 mcg oral tablet: 1 tab(s) orally once a day (03 Apr 2019 11:43)  potassium chloride 10 mEq oral tablet, extended release: 1 tab(s) orally once a day (03 Apr 2019 11:43)  pravastatin 40 mg oral tablet: 1 tab(s) orally once a day (03 Apr 2019 11:43)  rifAXIMin 550 mg oral tablet: 1 tab(s) orally 2 times a day (03 Apr 2019 11:43)  Vitamin D3 2000 intl units oral tablet: 1 tab(s) orally once a day (03 Apr 2019 11:43)      MEDICATIONS  (STANDING):  cholecalciferol 2000 Unit(s) Oral daily  dextrose 5%. 1000 milliLiter(s) (50 mL/Hr) IV Continuous <Continuous>  dextrose 50% Injectable 12.5 Gram(s) IV Push once  dextrose 50% Injectable 25 Gram(s) IV Push once  dextrose 50% Injectable 25 Gram(s) IV Push once  gabapentin 200 milliGRAM(s) Oral four times a day  insulin glargine Injectable (LANTUS) 25 Unit(s) SubCutaneous at bedtime  insulin lispro (HumaLOG) corrective regimen sliding scale   SubCutaneous three times a day before meals  insulin lispro (HumaLOG) corrective regimen sliding scale   SubCutaneous at bedtime  lactulose Syrup 10 Gram(s) Oral four times a day  lidocaine   Patch 1 Patch Transdermal daily  ondansetron Injectable 4 milliGRAM(s) IV Push every 6 hours  pantoprazole  Injectable 40 milliGRAM(s) IV Push every 12 hours  potassium chloride    Tablet ER 10 milliEquivalent(s) Oral daily  potassium chloride    Tablet ER 40 milliEquivalent(s) Oral once  rifaximin 550 milliGRAM(s) Oral two times a day  sodium chloride 0.45%. 1000 milliLiter(s) (75 mL/Hr) IV Continuous <Continuous>  sucralfate 1 Gram(s) Oral every 6 hours  ursodiol Capsule 300 milliGRAM(s) Oral three times a day    MEDICATIONS  (PRN):  acetaminophen   Tablet .. 650 milliGRAM(s) Oral every 6 hours PRN Mild Pain (1 - 3)  aluminum hydroxide/magnesium hydroxide/simethicone Suspension 30 milliLiter(s) Oral every 4 hours PRN Dyspepsia  dextrose 40% Gel 15 Gram(s) Oral once PRN Blood Glucose LESS THAN 70 milliGRAM(s)/deciliter  glucagon  Injectable 1 milliGRAM(s) IntraMuscular once PRN Glucose LESS THAN 70 milligrams/deciliter      Allergies    codeine (Anaphylaxis)    Intolerances        REVIEW OF SYSTEMS:  CONSTITUTIONAL: No fever, No chills, No fatigue, No myalgia, No Body ache, No Weakness  EYES: No eye pain,  No visual disturbances, No discharge, NO Redness  ENMT:  No ear pain, No nose bleed, No vertigo; No sinus or throat pain, No Congestion  NECK: No pain, No stiffness  RESPIRATORY: No cough, wheezing, No  hemoptysis, No shortness of breath  CARDIOVASCULAR: No chest pain, palpitations  GASTROINTESTINAL: No abdominal or epigastric pain. No nausea, No vomiting; No diarrhea or constipation. [  ] BM  GENITOURINARY: No dysuria, No frequency, No urgency, No hematuria, or incontinence  NEUROLOGICAL: No headaches, No dizziness, No numbness, No tingling, No tremors, No weakness  EXT: No Swelling, No Pain, No Edema  SKIN:  [  ] No itching, burning, rashes, or lesions   MUSCULOSKELETAL: No joint pain or swelling; No muscle pain, No back pain, No extremity pain  PSYCHIATRIC: No depression, anxiety, mood swings or difficulty sleeping at night  PAIN SCALE: [  ] None  [  ] Other-  ROS Unable to obtain due to - [  ] Dementia  [  ] Lethargy  [  ] Sedated [  ] non verbal  REST OF REVIEW Of SYSTEM - [  ] Normal     Vital Signs Last 24 Hrs  T(C): 36.4 (07 Apr 2019 05:20), Max: 36.9 (06 Apr 2019 14:33)  T(F): 97.5 (07 Apr 2019 05:20), Max: 98.4 (06 Apr 2019 14:33)  HR: 77 (07 Apr 2019 05:20) (77 - 88)  BP: 133/65 (07 Apr 2019 05:20) (117/56 - 133/65)  BP(mean): --  RR: 17 (07 Apr 2019 05:20) (17 - 17)  SpO2: 98% (07 Apr 2019 05:20) (97% - 98%)  Finger Stick          PHYSICAL EXAM:  GENERAL:  [  ] NAD , [  ] well appearing, [  ] Agitated, [  ] Drowsy,  [  ] Lethargy, [  ] confused   HEAD:  [  ] Normal, [  ] Other  EYES:  [  ] EOMI, [  ] PERRLA, [  ] conjunctiva and sclera clear normal, [  ] Other,  [  ] Pallor,[  ] Discharge  ENMT:  [  ] Normal, [  ] Moist mucous membranes, [  ] Good dentition, [  ] No Thrush  NECK:  [  ] Supple, [  ] No JVD, [  ] Normal thyroid, [  ] Lymphadenopathy [  ] Other  CHEST/LUNG:  [  ] Clear to auscultation bilaterally, [  ] Breath Sounds equal OR Decrease,  [  ] No rales, [  ] No rhonchi  [  ]  No wheezing  HEART:  [  ] Regular rate and rhythm, [  ] tachycardia, [  ] Bradycardia,  [  ] irregular  [  ] No murmurs, No rubs, No gallops, [  ] PPM in place (Mfr:  )  ABDOMEN:  [  ] Soft, [  ] Nontender, [  ] Nondistended, [  ] No mass, [  ] Bowel sounds present, [  ] obese  NERVOUS SYSTEM:  [  ] Alert & Oriented X3, [  ] Nonfocal  [  ] Confusion  [  ] Encephalopathic [  ] Sedated [  ] Unable to assess, [  ] Other-  EXTREMITIES: [  ] 2+ Peripheral Pulses, No clubbing, No cyanosis,  [  ] edema B/L lower EXT. [  ] PVD stasis skin changes B/L Lower EXT  LYMPH: No lymphadenopathy noted  SKIN:  [  ] No rashes or lesions, [  ] Pressure Ulcers, [  ] ecchymosis, [  ] Skin Tears, [  ] Other    DIET:     LABS:                        11.7   8.02  )-----------( 96       ( 07 Apr 2019 09:14 )             34.3     07 Apr 2019 09:14    143    |  110    |  14     ----------------------------<  85     3.5     |  26     |  0.82     Ca    8.2        07 Apr 2019 09:14    TPro  5.8    /  Alb  2.3    /  TBili  3.1    /  DBili  x      /  AST  32     /  ALT  24     /  AlkPhos  140    07 Apr 2019 09:14    Lipase, Serum: 52 U/L (04-03-19 @ 06:54)  Amylase, Serum Total: 26 U/L (04-03-19 @ 06:54)      RADIOLOGY & ADDITIONAL TESTS:      HEALTH ISSUES - PROBLEM Dx:  PUD (peptic ulcer disease): PUD (peptic ulcer disease)  Anemia: Anemia  Diabetes mellitus type 2, uncontrolled: Diabetes mellitus type 2, uncontrolled  Need for prophylactic measure: Need for prophylactic measure  Hypercholesteremia: Hypercholesteremia  Hypertension: Hypertension  Cirrhosis: Cirrhosis  GIB (gastrointestinal bleeding): GIB (gastrointestinal bleeding)  Diabetes: Diabetes  Elevated glucose: Elevated glucose  Lactic acidosis: Lactic acidosis  Hepatic encephalopathy: Hepatic encephalopathy  Abdominal pain: Abdominal pain  Gastroenteritis: Gastroenteritis          Consultant(s) Notes Reviewed:  [  ] YES     Care Discussed with [X] Consultants  [  ] Patient  [  ] Family  [  ]   [  ] Social Service  [  ] RN, [  ] Physical Therapy  DVT PPX: [  ] Lovenox, [  ] S C Heparin, [  ] Coumadin, [  ] Xarelto, [  ] Eliquis, [  ] Pradaxa, [  ] IV Heparin drip, [  ] SCD [  ] Contraindication 2 to GI Bleed,[  ] Ambulation  Advanced directive: [  ] None, [  ] DNR/DNI Patient is a 63y old  Male who presents with a chief complaint of n/v, abdominal pain (06 Apr 2019 22:35)      INTERVAL HPI:  Patient is a 61 y/o M with PMHx of Type 2 DM on insulin, HTN, HLD, obesity, nephrolithiasis, neuropathy, Hepatic encephalopathy, NAFLD, cirrhosis, Hyperammonemia,  Gastritis/ esophagitis, recent admission to [facility] on 12/2018 for syncope, UGIB s/p EGD, duodenal ulcer, s/p clips and cauterization, and e. coli bacteremia, presented to ED after 2 days of abdominal pain with nausea and vomiting. Reported multiple episodes of brown colored emesis. Patient also reported recent BMs that are dark in color, no diarrhea. Patient stated that he f/u with GI outpatient on Monday without any significant concerns. Patient also reported his son at home is also sick at home recently with similar symptoms of nausea, vomiting, and abdominal pain. Patient denies any headache, dizziness, visual changes, chest pain, sob, gamez, diarrhea, dysuria, or hematuria.  As per pt he has NOT taken his insulin on Tuesday as he was vomiting,    In the ED, initially VSS, most recent vs showed tachycardia 113bpm, and 150/78. Patient on room air  WBC wnl, h/h 12.6/36/8, plt 125  Initial lactate 7.2 --> 4.8 s/p 2 IVF bolus  Na 141, K 4.5, bicarb 23, anion gap serum 14, BUN 37, Cr 1.3, Glucose 399  albumin 2.4, Tbili 5, alk phos 273, Ast 42, GFR 58  lipase 52, amylase 26, serum acetone negative  UA; significant for RBC 25-50, WBC 6-10, protein 25, small ketones, moderate blood, granular cast, 1000 glucose  CT abdomen and pelvis w/ PO and IV contrast: Cirrhosis and portal hypertension. Mild, nonspecific haziness at the root of the small bowel mesentery; correlate clinically for pancreatitis. Pyloric and duodenal bulb thickening, recommend further clinical correlation for peptic ulcer disease.   EKG showed sinus tachy  Received 2x IVF bolus and 1x Zofran in ED.   4/5 /19 :Pt seen, examined, no acute events overnight, patient complains of GERD like sxs. FOBT + S/P EGD -Large non healing ulcer, No bleeding , MRI Abdomen today  4/6/19: Pt seen, Examined ,C/O Burning pain in esophagus & Stomach, on PO Diet, PPI. MRI Abdomen shows -Duodenitis  4/7/19 : Pt seen, Examined, feels OK, still c/o burning pain while eating, Stable H/H on PPI    OVERNIGHT EVENTS: none    Home Medications:  aluminum hydroxide-magnesium hydroxide 200 mg-200 mg/5 mL oral suspension: 30 milliliter(s) orally 2 times a day (03 Apr 2019 11:43)  furosemide 20 mg oral tablet: 2 tab(s) orally once a day (03 Apr 2019 11:43)  insulin glargine: 50 unit(s) subcutaneous once a day (at bedtime) pt has pen (03 Apr 2019 11:43)  lactulose 10 g/15 mL oral syrup: 15 milliliter(s) orally 4 times a day (03 Apr 2019 11:43)  pantoprazole 40 mg oral delayed release tablet: 1 tab(s) orally once a day (03 Apr 2019 11:43)  phytonadione 100 mcg oral tablet: 1 tab(s) orally once a day (03 Apr 2019 11:43)  potassium chloride 10 mEq oral tablet, extended release: 1 tab(s) orally once a day (03 Apr 2019 11:43)  pravastatin 40 mg oral tablet: 1 tab(s) orally once a day (03 Apr 2019 11:43)  rifAXIMin 550 mg oral tablet: 1 tab(s) orally 2 times a day (03 Apr 2019 11:43)  Vitamin D3 2000 intl units oral tablet: 1 tab(s) orally once a day (03 Apr 2019 11:43)      MEDICATIONS  (STANDING):  cholecalciferol 2000 Unit(s) Oral daily  dextrose 5%. 1000 milliLiter(s) (50 mL/Hr) IV Continuous <Continuous>  dextrose 50% Injectable 12.5 Gram(s) IV Push once  dextrose 50% Injectable 25 Gram(s) IV Push once  dextrose 50% Injectable 25 Gram(s) IV Push once  gabapentin 200 milliGRAM(s) Oral four times a day  insulin glargine Injectable (LANTUS) 25 Unit(s) SubCutaneous at bedtime  insulin lispro (HumaLOG) corrective regimen sliding scale   SubCutaneous three times a day before meals  insulin lispro (HumaLOG) corrective regimen sliding scale   SubCutaneous at bedtime  lactulose Syrup 10 Gram(s) Oral four times a day  lidocaine   Patch 1 Patch Transdermal daily  ondansetron Injectable 4 milliGRAM(s) IV Push every 6 hours  pantoprazole  Injectable 40 milliGRAM(s) IV Push every 12 hours  potassium chloride    Tablet ER 10 milliEquivalent(s) Oral daily  potassium chloride    Tablet ER 40 milliEquivalent(s) Oral once  rifaximin 550 milliGRAM(s) Oral two times a day  sodium chloride 0.45%. 1000 milliLiter(s) (75 mL/Hr) IV Continuous <Continuous>  sucralfate 1 Gram(s) Oral every 6 hours  ursodiol Capsule 300 milliGRAM(s) Oral three times a day    MEDICATIONS  (PRN):  acetaminophen   Tablet .. 650 milliGRAM(s) Oral every 6 hours PRN Mild Pain (1 - 3)  aluminum hydroxide/magnesium hydroxide/simethicone Suspension 30 milliLiter(s) Oral every 4 hours PRN Dyspepsia  dextrose 40% Gel 15 Gram(s) Oral once PRN Blood Glucose LESS THAN 70 milliGRAM(s)/deciliter  glucagon  Injectable 1 milliGRAM(s) IntraMuscular once PRN Glucose LESS THAN 70 milligrams/deciliter      Allergies    codeine (Anaphylaxis)    Intolerances    REVIEW OF SYSTEMS:   CONSTITUTIONAL: No fever, No chills, No fatigue, No myalgia, No Body ache, No Weakness  EYES: No eye pain,  No visual disturbances, No discharge, NO Redness  ENMT:  No ear pain, No nose bleed, No vertigo; No sinus or throat pain, No Congestion  NECK: No pain, No stiffness  RESPIRATORY: No cough, wheezing, No  hemoptysis, No shortness of breath  CARDIOVASCULAR: No chest pain, palpitations  GASTROINTESTINAL: No abdominal or epigastric pain. No nausea, No vomiting; No diarrhea or constipation. [ x ] BM  GENITOURINARY: No dysuria, No frequency, No urgency, No hematuria, or incontinence  NEUROLOGICAL: No headaches, No dizziness, No numbness, No tingling, No tremors, No weakness  EXT: No Swelling, No Pain, No Edema  SKIN:  [ x ] No itching, burning, rashes, or lesions   MUSCULOSKELETAL: No joint pain or swelling; No muscle pain, No back pain, No extremity pain  PSYCHIATRIC: No depression, anxiety, mood swings or difficulty sleeping at night  PAIN SCALE: [ x ] None  [  ] Other-  ROS Unable to obtain due to - [  ] Dementia  [  ] Lethargy  [  ] Sedated [  ] non verbal  REST OF REVIEW Of SYSTEM - [ x ] Normal     Vital Signs Last 24 Hrs  T(C): 36.4 (07 Apr 2019 05:20), Max: 36.9 (06 Apr 2019 14:33)  T(F): 97.5 (07 Apr 2019 05:20), Max: 98.4 (06 Apr 2019 14:33)  HR: 77 (07 Apr 2019 05:20) (77 - 88)  BP: 133/65 (07 Apr 2019 05:20) (117/56 - 133/65)  BP(mean): --  RR: 17 (07 Apr 2019 05:20) (17 - 17)  SpO2: 98% (07 Apr 2019 05:20) (97% - 98%)  Finger Stick      PHYSICAL EXAM:  GENERAL:  [ x ] NAD , [ x ] well appearing, [  ] Agitated, [  ] Drowsy,  [  ] Lethargy, [  ] confused   HEAD:  [ x ] Normal, [  ] Other  EYES:  [x  ] EOMI, [ x ] PERRLA, [ x ] conjunctiva and sclera clear normal, [  ] Other,  [  ] Pallor,[  ] Discharge  ENMT:  [ x ] Normal, [ x ] Moist mucous membranes, [ x ] Good dentition, [ x ] No Thrush  NECK:  [x  ] Supple, [ x ] No JVD, [ x ] Normal thyroid, [  ] Lymphadenopathy [  ] Other  CHEST/LUNG:  [ x ] Clear to auscultation bilaterally, [ x ] Breath Sounds equal B/l  [ x ] No rales, [ x ] No rhonchi  [ x ]  No wheezing  HEART:  [ x ] Regular rate and rhythm, [  ] tachycardia, [  ] Bradycardia,  [  ] irregular  [ x ] No murmurs, No rubs, No gallops, [  ] PPM in place (Mfr:  )  ABDOMEN:  [x  ] Soft, [ x] Nontender, [ x ] Nondistended, [x  ] No mass, [ x ] Bowel sounds present, [x  ] obese  NERVOUS SYSTEM:  [x ] Alert & Oriented X3, [ x ] Nonfocal  [  ] Confusion  [  ] Encephalopathic [  ] Sedated [  ] Unable to assess, [  ] Other-  EXTREMITIES: [x  ] 2+ Peripheral Pulses, No clubbing, No cyanosis,  [  ] edema B/L lower EXT. [  ] PVD stasis skin changes B/L Lower EXT  LYMPH: No lymphadenopathy noted  SKIN:  [ x ] No rashes or lesions, [  ] Pressure Ulcers, [  ] ecchymosis, [  ] Skin Tears, [  ] Other    DIET: regular    LABS:                        11.7   8.02  )-----------( 96       ( 07 Apr 2019 09:14 )             34.3     07 Apr 2019 09:14    143    |  110    |  14     ----------------------------<  85     3.5     |  26     |  0.82     Ca    8.2        07 Apr 2019 09:14    TPro  5.8    /  Alb  2.3    /  TBili  3.1    /  DBili  x      /  AST  32     /  ALT  24     /  AlkPhos  140    07 Apr 2019 09:14    Lipase, Serum: 52 U/L (04-03-19 @ 06:54)  Amylase, Serum Total: 26 U/L (04-03-19 @ 06:54)      RADIOLOGY & ADDITIONAL TESTS:none      HEALTH ISSUES - PROBLEM Dx:  PUD (peptic ulcer disease): PUD (peptic ulcer disease)  Anemia: Anemia  Diabetes mellitus type 2, uncontrolled: Diabetes mellitus type 2, uncontrolled  Need for prophylactic measure: Need for prophylactic measure  Hypercholesteremia: Hypercholesteremia  Hypertension: Hypertension  Cirrhosis: Cirrhosis  GIB (gastrointestinal bleeding): GIB (gastrointestinal bleeding)  Diabetes: Diabetes  Elevated glucose: Elevated glucose  Lactic acidosis: Lactic acidosis  Hepatic encephalopathy: Hepatic encephalopathy  Abdominal pain: Abdominal pain  Gastroenteritis: Gastroenteritis      Consultant(s) Notes Reviewed:  [ x ] YES     Care Discussed with [X] Consultants  [x  ] Patient  [ x ] Family  [  ]   [  ] Social Service  [ x ] RN, [  ] Physical Therapy  DVT PPX: [  ] Lovenox, [  ] S C Heparin, [  ] Coumadin, [  ] Xarelto, [  ] Eliquis, [  ] Pradaxa, [  ] IV Heparin drip, [x  ] SCD [  ] Contraindication 2 to GI Bleed,[  ] Ambulation  Advanced directive: [x  ] None, [  ] DNR/DNI

## 2019-04-07 NOTE — PROGRESS NOTE ADULT - ASSESSMENT
Patient is a 61 y/o M with PMHx of Type 2 DM on insulin, HTN, HLD, obesity, nephrolithiasis, neuropathy, Hepatic encephalopathy, NAFLD, cirrhosis, Hyperammonemia,  Gastritis/ esophagitis, [facility] for syncope, UGIB s/p EGD, duodenal ulcer, s/p clips and cauterization, and e. coli bacteremia, presented to ED after 2 days of abdominal pain with nausea and vomiting, admit for evaluation of abdominal pain, S/P EGD, esophagitis, PUD.

## 2019-04-07 NOTE — PROGRESS NOTE ADULT - PROBLEM SELECTOR PLAN 2
-hx of GIB, reported brown colored emesis and dark color stools on admission  -FOBT positive  -S/P  EGD + Duodenal ulcer , H/o PUD & GI bleed  -trend H-H  -Protonix 40 mg  BID, Carafate QID  D/W DR Morales.

## 2019-04-07 NOTE — PROGRESS NOTE ADULT - SUBJECTIVE AND OBJECTIVE BOX
INTERVAL HPI/OVERNIGHT EVENTS:  No new overnight event.  No N/V/D.  Tolerating diet.  still with burning also    Allergies    codeine (Anaphylaxis)    Intolerances    General:  No wt loss, fevers, chills, night sweats, fatigue,   Eyes:  Good vision, no reported pain  ENT:  No sore throat, pain, runny nose, dysphagia  CV:  No pain, palpitations, hypo/hypertension  Resp:  No dyspnea, cough, tachypnea, wheezing  GI:  No pain, No nausea, No vomiting, No diarrhea, No constipation, No weight loss, No fever, No pruritis, No rectal bleeding, No tarry stools, No dysphagia,  :  No pain, bleeding, incontinence, nocturia  Muscle:  No pain, weakness  Neuro:  No weakness, tingling, memory problems  Psych:  No fatigue, insomnia, mood problems, depression  Endocrine:  No polyuria, polydipsia, cold/heat intolerance  Heme:  No petechiae, ecchymosis, easy bruisability  Skin:  No rash, tattoos, scars, edema      PHYSICAL EXAM:   Vital Signs:  Vital Signs Last 24 Hrs  T(C): 36.6 (07 Apr 2019 13:25), Max: 36.8 (06 Apr 2019 20:52)  T(F): 97.8 (07 Apr 2019 13:25), Max: 98.3 (06 Apr 2019 20:52)  HR: 86 (07 Apr 2019 13:25) (77 - 88)  BP: 146/74 (07 Apr 2019 13:25) (129/69 - 146/74)  BP(mean): --  RR: 17 (07 Apr 2019 13:25) (17 - 17)  SpO2: 100% (07 Apr 2019 13:25) (98% - 100%)  Daily     Daily I&O's Summary      GENERAL:  Appears stated age, well-groomed, well-nourished, no distress  HEENT:  NC/AT,  conjunctivae clear and pink, no thyromegaly, nodules, adenopathy, no JVD, sclera -anicteric  CHEST:  Full & symmetric excursion, no increased effort, breath sounds clear  HEART:  Regular rhythm, S1, S2, no murmur/rub/S3/S4, no abdominal bruit, no edema  ABDOMEN:  Soft, non-tender, non-distended, normoactive bowel sounds,  no masses ,no hepato-splenomegaly, no signs of chronic liver disease  EXTEREMITIES:  no cyanosis,clubbing or edema  SKIN:  No rash/erythema/ecchymoses/petechiae/wounds/abscess/warm/dry  NEURO:  Alert, oriented, no asterixis, no tremor, no encephalopathy      LABS:                        11.7   8.02  )-----------( 96       ( 07 Apr 2019 09:14 )             34.3     04-07    143  |  110<H>  |  14  ----------------------------<  85  3.5   |  26  |  0.82    Ca    8.2<L>      07 Apr 2019 09:14    TPro  5.8<L>  /  Alb  2.3<L>  /  TBili  3.1<H>  /  DBili  x   /  AST  32  /  ALT  24  /  AlkPhos  140<H>  04-07        amylaseAmylase, Serum Total: 26 U/L (04-03 @ 06:54)     lipaseLipase, Serum: 52 U/L (04-03 @ 06:54)    RADIOLOGY & ADDITIONAL TESTS:

## 2019-04-08 ENCOUNTER — TRANSCRIPTION ENCOUNTER (OUTPATIENT)
Age: 64
End: 2019-04-08

## 2019-04-08 VITALS
RESPIRATION RATE: 18 BRPM | DIASTOLIC BLOOD PRESSURE: 76 MMHG | SYSTOLIC BLOOD PRESSURE: 136 MMHG | TEMPERATURE: 98 F | HEART RATE: 77 BPM | OXYGEN SATURATION: 95 %

## 2019-04-08 LAB
ALBUMIN SERPL ELPH-MCNC: 2.3 G/DL — LOW (ref 3.3–5)
ALP SERPL-CCNC: 134 U/L — HIGH (ref 40–120)
ALT FLD-CCNC: 26 U/L — SIGNIFICANT CHANGE UP (ref 12–78)
ANION GAP SERPL CALC-SCNC: 8 MMOL/L — SIGNIFICANT CHANGE UP (ref 5–17)
AST SERPL-CCNC: 35 U/L — SIGNIFICANT CHANGE UP (ref 15–37)
BILIRUB SERPL-MCNC: 3.2 MG/DL — HIGH (ref 0.2–1.2)
BUN SERPL-MCNC: 13 MG/DL — SIGNIFICANT CHANGE UP (ref 7–23)
CALCIUM SERPL-MCNC: 8.3 MG/DL — LOW (ref 8.5–10.1)
CHLORIDE SERPL-SCNC: 110 MMOL/L — HIGH (ref 96–108)
CO2 SERPL-SCNC: 25 MMOL/L — SIGNIFICANT CHANGE UP (ref 22–31)
CREAT SERPL-MCNC: 0.88 MG/DL — SIGNIFICANT CHANGE UP (ref 0.5–1.3)
GLUCOSE SERPL-MCNC: 79 MG/DL — SIGNIFICANT CHANGE UP (ref 70–99)
HCT VFR BLD CALC: 33.7 % — LOW (ref 39–50)
HGB BLD-MCNC: 11.4 G/DL — LOW (ref 13–17)
MCHC RBC-ENTMCNC: 32.3 PG — SIGNIFICANT CHANGE UP (ref 27–34)
MCHC RBC-ENTMCNC: 33.8 GM/DL — SIGNIFICANT CHANGE UP (ref 32–36)
MCV RBC AUTO: 95.5 FL — SIGNIFICANT CHANGE UP (ref 80–100)
NRBC # BLD: 0 /100 WBCS — SIGNIFICANT CHANGE UP (ref 0–0)
PLATELET # BLD AUTO: 93 K/UL — LOW (ref 150–400)
POTASSIUM SERPL-MCNC: 3.4 MMOL/L — LOW (ref 3.5–5.3)
POTASSIUM SERPL-SCNC: 3.4 MMOL/L — LOW (ref 3.5–5.3)
PROT SERPL-MCNC: 5.7 G/DL — LOW (ref 6–8.3)
RBC # BLD: 3.53 M/UL — LOW (ref 4.2–5.8)
RBC # FLD: 18 % — HIGH (ref 10.3–14.5)
SODIUM SERPL-SCNC: 143 MMOL/L — SIGNIFICANT CHANGE UP (ref 135–145)
WBC # BLD: 6.6 K/UL — SIGNIFICANT CHANGE UP (ref 3.8–10.5)
WBC # FLD AUTO: 6.6 K/UL — SIGNIFICANT CHANGE UP (ref 3.8–10.5)

## 2019-04-08 PROCEDURE — 93005 ELECTROCARDIOGRAM TRACING: CPT

## 2019-04-08 PROCEDURE — 88312 SPECIAL STAINS GROUP 1: CPT

## 2019-04-08 PROCEDURE — 82140 ASSAY OF AMMONIA: CPT

## 2019-04-08 PROCEDURE — 74177 CT ABD & PELVIS W/CONTRAST: CPT

## 2019-04-08 PROCEDURE — 36415 COLL VENOUS BLD VENIPUNCTURE: CPT

## 2019-04-08 PROCEDURE — 36600 WITHDRAWAL OF ARTERIAL BLOOD: CPT

## 2019-04-08 PROCEDURE — 82009 KETONE BODYS QUAL: CPT

## 2019-04-08 PROCEDURE — 85027 COMPLETE CBC AUTOMATED: CPT

## 2019-04-08 PROCEDURE — 73030 X-RAY EXAM OF SHOULDER: CPT

## 2019-04-08 PROCEDURE — 82803 BLOOD GASES ANY COMBINATION: CPT

## 2019-04-08 PROCEDURE — 83605 ASSAY OF LACTIC ACID: CPT

## 2019-04-08 PROCEDURE — 80053 COMPREHEN METABOLIC PANEL: CPT

## 2019-04-08 PROCEDURE — 85730 THROMBOPLASTIN TIME PARTIAL: CPT

## 2019-04-08 PROCEDURE — 83036 HEMOGLOBIN GLYCOSYLATED A1C: CPT

## 2019-04-08 PROCEDURE — 88313 SPECIAL STAINS GROUP 2: CPT

## 2019-04-08 PROCEDURE — 82248 BILIRUBIN DIRECT: CPT

## 2019-04-08 PROCEDURE — 82272 OCCULT BLD FECES 1-3 TESTS: CPT

## 2019-04-08 PROCEDURE — 99285 EMERGENCY DEPT VISIT HI MDM: CPT | Mod: 25

## 2019-04-08 PROCEDURE — 86803 HEPATITIS C AB TEST: CPT

## 2019-04-08 PROCEDURE — 81001 URINALYSIS AUTO W/SCOPE: CPT

## 2019-04-08 PROCEDURE — 88305 TISSUE EXAM BY PATHOLOGIST: CPT

## 2019-04-08 PROCEDURE — 80061 LIPID PANEL: CPT

## 2019-04-08 PROCEDURE — 83690 ASSAY OF LIPASE: CPT

## 2019-04-08 PROCEDURE — 96374 THER/PROPH/DIAG INJ IV PUSH: CPT | Mod: XU

## 2019-04-08 PROCEDURE — 74182 MRI ABDOMEN W/CONTRAST: CPT

## 2019-04-08 PROCEDURE — 85610 PROTHROMBIN TIME: CPT

## 2019-04-08 PROCEDURE — A9579: CPT

## 2019-04-08 PROCEDURE — 82962 GLUCOSE BLOOD TEST: CPT

## 2019-04-08 PROCEDURE — 82150 ASSAY OF AMYLASE: CPT

## 2019-04-08 RX ORDER — GABAPENTIN 400 MG/1
2 CAPSULE ORAL
Qty: 0 | Refills: 0 | DISCHARGE
Start: 2019-04-08

## 2019-04-08 RX ORDER — DIPHENHYDRAMINE HYDROCHLORIDE AND LIDOCAINE HYDROCHLORIDE AND ALUMINUM HYDROXIDE AND MAGNESIUM HYDRO
30 KIT
Qty: 300 | Refills: 0
Start: 2019-04-08 | End: 2019-04-17

## 2019-04-08 RX ORDER — PANTOPRAZOLE SODIUM 20 MG/1
1 TABLET, DELAYED RELEASE ORAL
Qty: 60 | Refills: 0 | OUTPATIENT
Start: 2019-04-08 | End: 2019-05-07

## 2019-04-08 RX ORDER — INSULIN GLARGINE 100 [IU]/ML
15 INJECTION, SOLUTION SUBCUTANEOUS AT BEDTIME
Qty: 0 | Refills: 0 | Status: DISCONTINUED | OUTPATIENT
Start: 2019-04-08 | End: 2019-04-08

## 2019-04-08 RX ORDER — SUCRALFATE 1 G
1 TABLET ORAL
Qty: 120 | Refills: 0 | OUTPATIENT
Start: 2019-04-08 | End: 2019-05-07

## 2019-04-08 RX ORDER — SUCRALFATE 1 G
1 TABLET ORAL
Qty: 120 | Refills: 0
Start: 2019-04-08 | End: 2019-05-07

## 2019-04-08 RX ADMIN — Medication 650 MILLIGRAM(S): at 11:00

## 2019-04-08 RX ADMIN — ONDANSETRON 4 MILLIGRAM(S): 8 TABLET, FILM COATED ORAL at 18:40

## 2019-04-08 RX ADMIN — Medication 10 MILLIEQUIVALENT(S): at 12:44

## 2019-04-08 RX ADMIN — GABAPENTIN 200 MILLIGRAM(S): 400 CAPSULE ORAL at 06:21

## 2019-04-08 RX ADMIN — GABAPENTIN 200 MILLIGRAM(S): 400 CAPSULE ORAL at 18:40

## 2019-04-08 RX ADMIN — LACTULOSE 10 GRAM(S): 10 SOLUTION ORAL at 12:45

## 2019-04-08 RX ADMIN — ONDANSETRON 4 MILLIGRAM(S): 8 TABLET, FILM COATED ORAL at 12:45

## 2019-04-08 RX ADMIN — PANTOPRAZOLE SODIUM 40 MILLIGRAM(S): 20 TABLET, DELAYED RELEASE ORAL at 06:21

## 2019-04-08 RX ADMIN — PANTOPRAZOLE SODIUM 40 MILLIGRAM(S): 20 TABLET, DELAYED RELEASE ORAL at 18:38

## 2019-04-08 RX ADMIN — GABAPENTIN 200 MILLIGRAM(S): 400 CAPSULE ORAL at 12:44

## 2019-04-08 RX ADMIN — LIDOCAINE 1 PATCH: 4 CREAM TOPICAL at 10:02

## 2019-04-08 RX ADMIN — DIPHENHYDRAMINE HYDROCHLORIDE AND LIDOCAINE HYDROCHLORIDE AND ALUMINUM HYDROXIDE AND MAGNESIUM HYDRO 30 MILLILITER(S): KIT at 06:20

## 2019-04-08 RX ADMIN — Medication 1 GRAM(S): at 18:40

## 2019-04-08 RX ADMIN — ONDANSETRON 4 MILLIGRAM(S): 8 TABLET, FILM COATED ORAL at 06:21

## 2019-04-08 RX ADMIN — LACTULOSE 10 GRAM(S): 10 SOLUTION ORAL at 18:39

## 2019-04-08 RX ADMIN — DIPHENHYDRAMINE HYDROCHLORIDE AND LIDOCAINE HYDROCHLORIDE AND ALUMINUM HYDROXIDE AND MAGNESIUM HYDRO 30 MILLILITER(S): KIT at 02:17

## 2019-04-08 RX ADMIN — LACTULOSE 10 GRAM(S): 10 SOLUTION ORAL at 06:20

## 2019-04-08 RX ADMIN — Medication 2000 UNIT(S): at 12:44

## 2019-04-08 RX ADMIN — URSODIOL 300 MILLIGRAM(S): 250 TABLET, FILM COATED ORAL at 14:45

## 2019-04-08 RX ADMIN — DIPHENHYDRAMINE HYDROCHLORIDE AND LIDOCAINE HYDROCHLORIDE AND ALUMINUM HYDROXIDE AND MAGNESIUM HYDRO 30 MILLILITER(S): KIT at 18:40

## 2019-04-08 RX ADMIN — URSODIOL 300 MILLIGRAM(S): 250 TABLET, FILM COATED ORAL at 06:21

## 2019-04-08 RX ADMIN — Medication 1 GRAM(S): at 12:44

## 2019-04-08 RX ADMIN — Medication 6: at 18:40

## 2019-04-08 RX ADMIN — Medication 650 MILLIGRAM(S): at 10:03

## 2019-04-08 RX ADMIN — DIPHENHYDRAMINE HYDROCHLORIDE AND LIDOCAINE HYDROCHLORIDE AND ALUMINUM HYDROXIDE AND MAGNESIUM HYDRO 30 MILLILITER(S): KIT at 14:45

## 2019-04-08 RX ADMIN — DIPHENHYDRAMINE HYDROCHLORIDE AND LIDOCAINE HYDROCHLORIDE AND ALUMINUM HYDROXIDE AND MAGNESIUM HYDRO 30 MILLILITER(S): KIT at 09:44

## 2019-04-08 RX ADMIN — Medication 1 GRAM(S): at 06:21

## 2019-04-08 NOTE — PROGRESS NOTE ADULT - SUBJECTIVE AND OBJECTIVE BOX
CAPILLARY BLOOD GLUCOSE      POCT Blood Glucose.: 74 mg/dL (08 Apr 2019 08:12)  POCT Blood Glucose.: 167 mg/dL (07 Apr 2019 21:47)  POCT Blood Glucose.: 113 mg/dL (07 Apr 2019 17:08)  POCT Blood Glucose.: 103 mg/dL (07 Apr 2019 12:10)      Vital Signs Last 24 Hrs  T(C): 36.4 (08 Apr 2019 05:51), Max: 36.9 (07 Apr 2019 21:35)  T(F): 97.5 (08 Apr 2019 05:51), Max: 98.5 (07 Apr 2019 21:35)  HR: 76 (08 Apr 2019 05:51) (76 - 86)  BP: 131/64 (08 Apr 2019 05:51) (128/76 - 146/74)  BP(mean): --  RR: 17 (08 Apr 2019 05:51) (15 - 17)  SpO2: 96% (08 Apr 2019 05:51) (96% - 100%)    General: WN/WD NAD  Respiratory: CTA B/L  CV: RRR, S1S2, no murmurs, rubs or gallops  Abdominal: Soft, NT, ND +BS, Last BM  Extremities: No edema, + peripheral pulses     04-07    143  |  110<H>  |  14  ----------------------------<  85  3.5   |  26  |  0.82    Ca    8.2<L>      07 Apr 2019 09:14    TPro  5.8<L>  /  Alb  2.3<L>  /  TBili  3.1<H>  /  DBili  x   /  AST  32  /  ALT  24  /  AlkPhos  140<H>  04-07      dextrose 40% Gel 15 Gram(s) Oral once PRN  dextrose 50% Injectable 12.5 Gram(s) IV Push once  dextrose 50% Injectable 25 Gram(s) IV Push once  dextrose 50% Injectable 25 Gram(s) IV Push once  glucagon  Injectable 1 milliGRAM(s) IntraMuscular once PRN  insulin glargine Injectable (LANTUS) 25 Unit(s) SubCutaneous at bedtime  insulin lispro (HumaLOG) corrective regimen sliding scale   SubCutaneous three times a day before meals  insulin lispro (HumaLOG) corrective regimen sliding scale   SubCutaneous at bedtime

## 2019-04-08 NOTE — DISCHARGE NOTE PROVIDER - HOSPITAL COURSE
Patient is a 61 y/o M with PMHx of Type 2 DM on insulin, HTN, HLD, obesity, nephrolithiasis, neuropathy, Hepatic encephalopathy, NAFLD, cirrhosis, Hyperammonemia,  Gastritis/ esophagitis, recent admission to [facility] on 12/2018 for syncope, UGIB s/p EGD, duodenal ulcer, s/p clips and cauterization, and e. coli bacteremia, presented to ED after 2 days of abdominal pain with nausea and vomiting. Reported multiple episodes of brown colored emesis. Patient also reported recent BMs that are dark in color, no diarrhea. Patient stated that he f/u with GI outpatient on Monday without any significant concerns. Patient also reported his son at home is also sick at home recently with similar symptoms of nausea, vomiting, and abdominal pain. Patient denies any headache, dizziness, visual changes, chest pain, sob, gamez, diarrhea, dysuria, or hematuria.  As per pt he has NOT taken his insulin on Tuesday as he was vomiting        FOBT +, GI was consulted. Patient underwent an EGD which showed large duodenal ulcer s/p clips and cauterization, no bleeding noted.     MRI abdomen- duodenitis, to be followed up outpatient    patient did complain of burning sensation in esophagus and stomach Patient is a 63 y/o M with PMHx of Type 2 DM on insulin, HTN, HLD, obesity, nephrolithiasis, neuropathy, Hepatic encephalopathy, NAFLD, cirrhosis, Hyperammonemia,  Gastritis/ esophagitis, recent admission to [facility] [place] on 12/2018 for syncope, UGIB s/p EGD, duodenal ulcer, s/p clips and cauterization, and e. coli bacteremia, presented to ED after 2 days of abdominal pain with nausea and vomiting. Reported multiple episodes of brown colored emesis. Patient also reported recent BMs that are dark in color, no diarrhea. Patient stated that he f/u with GI outpatient on Monday without any significant concerns. Patient also reported his son at home is also sick at home recently with similar symptoms of nausea, vomiting, and abdominal pain. Patient denies any headache, dizziness, visual changes, chest pain, sob, gamez, diarrhea, dysuria, or hematuria.  As per pt he has NOT taken his insulin on Tuesday as he was vomiting        FOBT +, GI was consulted. Patient underwent an EGD which showed large duodenal ulcer s/p clips and cauterization, no bleeding noted.     MRI abdomen- duodenitis, to be followed up outpatient    patient did complain of burning sensation in esophagus and stomach and was continued on Patient is a 61 y/o M with PMHx of Type 2 DM on insulin, HTN, HLD, obesity, nephrolithiasis, neuropathy, Hepatic encephalopathy, NAFLD, cirrhosis, Hyperammonemia,  Gastritis/ esophagitis, recent admission to [facility] on 12/2018 for syncope, UGIB s/p EGD, duodenal ulcer, s/p clips and cauterization, and e. coli bacteremia, presented to ED after 2 days of abdominal pain with nausea and vomiting. Reported multiple episodes of brown colored emesis. Patient also reported recent BMs that are dark in color, no diarrhea. Patient stated that he f/u with GI outpatient on Monday without any significant concerns. Patient also reported his son at home is also sick at home recently with similar symptoms of nausea, vomiting, and abdominal pain. Patient denies any headache, dizziness, visual changes, chest pain, sob, gamez, diarrhea, dysuria, or hematuria.  As per pt he has NOT taken his insulin on Tuesday as he was vomiting        FOBT +, GI was consulted. Patient underwent an EGD which showed large non-healing duodenal ulcer s/p clips and cauterization, no bleeding noted.     MRI without contrast abdomen- < from: MR Abdomen w/ IV Cont (04.05.19 @ 19:08) >        Cirrhosis with prominent portosystemic varices suggesting portal     hypertension.    Possible mild uncomplicated pancreatitis. Recommend enzyme correlation.    Duodenitis.    Trace ascites.        < end of copied text >        patient did complain of burning sensation in esophagus and stomach which improved with mouthwash BLM.            upon discharge, patient to be taking 40mg protonix BID, carafate 4 weeks     to follow up with Dr. Morales outpatient for Monday April 15th 10:45am. From HPI:    Patient is a 63 y/o M with PMHx of Type 2 DM on insulin, HTN, HLD, obesity, nephrolithiasis, neuropathy, Hepatic encephalopathy, NAFLD, cirrhosis, Hyperammonemia,  Gastritis/ esophagitis, [facility] for syncope, UGIB s/p EGD, duodenal ulcer, s/p clips and cauterization, and e. coli bacteremia, presented to ED after 2 days of abdominal pain with nausea and vomiting. Reported multiple episodes of brown colored emesis. Patient also reported recent BMs that are dark in color, no diarrhea. Patient stated that he f/u with GI outpatient on Monday without any significant concerns. Patient also reported his son at home is also sick at home recently with similar symptoms of nausea, vomiting, and abdominal pain. Patient denies any headache, dizziness, visual changes, chest pain, sob, gamez, diarrhea, dysuria, or hematuria.  As per pt he has NOT taken his insulin on Tuesday as he was vomiting,        In the ED, initially VSS, most recent vs showed tachycardia 113bpm, and 150/78. Patient on room air    WBC wnl, h/h 12.6/36/8, plt 125    Initial lactate 7.2 --> 4.8 s/p 2 IVF bolus    Na 141, K 4.5, bicarb 23, anion gap serum 14, BUN 37, Cr 1.3, Glucose 399    albumin 2.4, Tbili 5, alk phos 273, Ast 42, GFR 58    lipase 52, amylase 26, serum acetone negative    UA; significant for RBC 25-50, WBC 6-10, protein 25, small ketones, moderate blood, granular cast, 1000 glucose    CT abdomen and pelvis w/ PO and IV contrast: Cirrhosis and portal hypertension. Mild, nonspecific haziness at the root of the small bowel mesentery; correlate clinically for pancreatitis. Pyloric and duodenal bulb thickening, recommend further clinical correlation for peptic ulcer disease.     EKG showed sinus tachy    Received 2x IVF bolus and 1x Zofran in ED.         Hospital course:        Seen by GI, Dr. Morales, found large duodenal ulcer with esophagitis on EGD. MRI abdomen showed duodenitis, mild pancreatitis, cirrhosis and portal HTN . Started on Protonix 40 mg BID, Zofran and Reglan PRN, carafate. FOBT was positive, H/H stable. Seen by Dr. Craig Perlman, endo, placed on moderate dose sliding scale insulin with basal lantus 35 units QHS. Continued on lactulose, ursodiol and rifaximin for cirrhosis. Patient placed on First mouthwash for decreased po intake due to discomfort while eating with improvement in symptoms.     upon discharge, patient to be taking 40mg protonix BID, carafate 4 weeks and with prescription for First mouthwash.     Plan to follow up with Dr. Morales outpatient for Monday April 15th 10:45am. From HPI:    Patient is a 61 y/o M with PMHx of Type 2 DM on insulin, HTN, HLD, obesity, nephrolithiasis, neuropathy, Hepatic encephalopathy, NAFLD, cirrhosis, Hyperammonemia,  Gastritis/ esophagitis, recent admission to [facility] on 12/2018 for syncope, UGIB s/p EGD, duodenal ulcer, s/p clips and cauterization, and e. coli bacteremia, presented to ED after 2 days of abdominal pain with nausea and vomiting. Reported multiple episodes of brown colored emesis. Patient also reported recent BMs that are dark in color, no diarrhea. Patient stated that he f/u with GI outpatient on Monday without any significant concerns. Patient also reported his son at home is also sick at home recently with similar symptoms of nausea, vomiting, and abdominal pain. Patient denies any headache, dizziness, visual changes, chest pain, sob, gamez, diarrhea, dysuria, or hematuria.  As per pt he has NOT taken his insulin on Tuesday as he was vomiting,        In the ED, initially VSS, most recent vs showed tachycardia 113bpm, and 150/78. Patient on room air    WBC wnl, h/h 12.6/36/8, plt 125    Initial lactate 7.2 --> 4.8 s/p 2 IVF bolus    Na 141, K 4.5, bicarb 23, anion gap serum 14, BUN 37, Cr 1.3, Glucose 399    albumin 2.4, Tbili 5, alk phos 273, Ast 42, GFR 58    lipase 52, amylase 26, serum acetone negative    UA; significant for RBC 25-50, WBC 6-10, protein 25, small ketones, moderate blood, granular cast, 1000 glucose    CT abdomen and pelvis w/ PO and IV contrast: Cirrhosis and portal hypertension. Mild, nonspecific haziness at the root of the small bowel mesentery; correlate clinically for pancreatitis. Pyloric and duodenal bulb thickening, recommend further clinical correlation for peptic ulcer disease.     EKG showed sinus tachy    Received 2x IVF bolus and 1x Zofran in ED.         Hospital course:    Pt was admitted for abdominal pain , Pancreatitis. on PPI, Carafate , FOBT +    Seen by GI, Dr. Morales, found large duodenal ulcer with esophagitis on EGD. MRI abdomen showed duodenitis, mild pancreatitis, cirrhosis and portal HTN . Started on Protonix 40 mg BID, Zofran and Reglan PRN, Carafate 4x day . FOBT was positive, H/H stable. Seen by Dr. Craig Perlman, [facility], placed on moderate dose sliding scale insulin with basal lantus 35 units QHS. Continued on lactulose, ursodiol and rifaximin for cirrhosis. Patient placed on First mouthwash for decreased po intake due to discomfort while eating with improvement in symptoms.     upon discharge, patient to be taking 40mg protonix BID, carafate 4 weeks and with prescription for First mouthwash.     Plan to follow up with Dr. Morales outpatient for Monday April 15th 10:45am. for out pt EUS schedule  & Possible Bx report.

## 2019-04-08 NOTE — PROGRESS NOTE ADULT - PROBLEM SELECTOR PROBLEM 1
Abdominal pain
Diabetes mellitus type 2, uncontrolled
Gastroenteritis
PUD (peptic ulcer disease)
Gastroenteritis
PUD (peptic ulcer disease)
PUD (peptic ulcer disease)

## 2019-04-08 NOTE — PROGRESS NOTE ADULT - SUBJECTIVE AND OBJECTIVE BOX
INTERVAL HPI/OVERNIGHT EVENTS:  pt seen and examined  denies n/v/abd pain  reports +bms  c/o sore throat  afebrile overnight labs pending    MEDICATIONS  (STANDING):  cholecalciferol 2000 Unit(s) Oral daily  dextrose 5%. 1000 milliLiter(s) (50 mL/Hr) IV Continuous <Continuous>  dextrose 50% Injectable 12.5 Gram(s) IV Push once  dextrose 50% Injectable 25 Gram(s) IV Push once  dextrose 50% Injectable 25 Gram(s) IV Push once  FIRST- Mouthwash  BLM 30 milliLiter(s) Swish and Swallow every 4 hours  gabapentin 200 milliGRAM(s) Oral four times a day  insulin glargine Injectable (LANTUS) 15 Unit(s) SubCutaneous at bedtime  insulin lispro (HumaLOG) corrective regimen sliding scale   SubCutaneous three times a day before meals  insulin lispro (HumaLOG) corrective regimen sliding scale   SubCutaneous at bedtime  lactulose Syrup 10 Gram(s) Oral four times a day  lidocaine   Patch 1 Patch Transdermal daily  ondansetron Injectable 4 milliGRAM(s) IV Push every 6 hours  pantoprazole  Injectable 40 milliGRAM(s) IV Push every 12 hours  potassium chloride    Tablet ER 10 milliEquivalent(s) Oral daily  rifaximin 550 milliGRAM(s) Oral two times a day  sucralfate 1 Gram(s) Oral every 6 hours  ursodiol Capsule 300 milliGRAM(s) Oral three times a day    MEDICATIONS  (PRN):  acetaminophen   Tablet .. 650 milliGRAM(s) Oral every 6 hours PRN Mild Pain (1 - 3)  dextrose 40% Gel 15 Gram(s) Oral once PRN Blood Glucose LESS THAN 70 milliGRAM(s)/deciliter  glucagon  Injectable 1 milliGRAM(s) IntraMuscular once PRN Glucose LESS THAN 70 milligrams/deciliter      Allergies    codeine (Anaphylaxis)    Intolerances        Review of Systems:    General:  No wt loss, fevers, chills, night sweats, fatigue   Eyes:  Good vision, no reported pain  ENT:  sore throat  CV:  No pain, palpitations, hypo/hypertension  Resp:  No dyspnea, cough, tachypnea, wheezing  GI:  No pain, No nausea, No vomiting, No diarrhea, No constipation, No weight loss, No fever, No pruritis, No rectal bleeding, No melena, No dysphagia  :  No pain, bleeding, incontinence, nocturia  Muscle:  No pain, weakness  Neuro:  No weakness, tingling, memory problems  Psych:  No fatigue, insomnia, mood problems, depression  Endocrine:  No polyuria, polydypsia, cold/heat intolerance  Heme:  No petechiae, ecchymosis, easy bruisability  Skin:  No rash, tattoos, scars, edema      Vital Signs Last 24 Hrs  T(C): 36.4 (08 Apr 2019 05:51), Max: 36.9 (07 Apr 2019 21:35)  T(F): 97.5 (08 Apr 2019 05:51), Max: 98.5 (07 Apr 2019 21:35)  HR: 76 (08 Apr 2019 05:51) (76 - 86)  BP: 131/64 (08 Apr 2019 05:51) (128/76 - 146/74)  BP(mean): --  RR: 17 (08 Apr 2019 05:51) (15 - 17)  SpO2: 96% (08 Apr 2019 05:51) (96% - 100%)    PHYSICAL EXAM:  Constitutional: NAD  HEENT: ncat  Neck: No LAD  Respiratory: dec bs  Cardiovascular: S1 and S2, RRR  Gastrointestinal: obese soft nt mild dt  Extremities: No peripheral edema  Vascular: 2+ peripheral pulses  Neurological: Awake alert responds appropriately  Skin: mild jaundice    LABS:                        11.7   8.02  )-----------( 96       ( 07 Apr 2019 09:14 )             34.3     04-07    143  |  110<H>  |  14  ----------------------------<  85  3.5   |  26  |  0.82    Ca    8.2<L>      07 Apr 2019 09:14    TPro  5.8<L>  /  Alb  2.3<L>  /  TBili  3.1<H>  /  DBili  x   /  AST  32  /  ALT  24  /  AlkPhos  140<H>  04-07          RADIOLOGY & ADDITIONAL TESTS:

## 2019-04-08 NOTE — PROGRESS NOTE ADULT - SUBJECTIVE AND OBJECTIVE BOX
Patient is a 63y old  Male who presents with a chief complaint of n/v, abdominal pain (08 Apr 2019 09:03)      INTERVAL HPI:      OVERNIGHT EVENTS:    Home Medications:  aluminum hydroxide-magnesium hydroxide 200 mg-200 mg/5 mL oral suspension: 30 milliliter(s) orally 2 times a day (03 Apr 2019 11:43)  furosemide 20 mg oral tablet: 2 tab(s) orally once a day (03 Apr 2019 11:43)  insulin glargine: 50 unit(s) subcutaneous once a day (at bedtime) pt has pen (03 Apr 2019 11:43)  lactulose 10 g/15 mL oral syrup: 15 milliliter(s) orally 4 times a day (03 Apr 2019 11:43)  pantoprazole 40 mg oral delayed release tablet: 1 tab(s) orally once a day (03 Apr 2019 11:43)  phytonadione 100 mcg oral tablet: 1 tab(s) orally once a day (03 Apr 2019 11:43)  potassium chloride 10 mEq oral tablet, extended release: 1 tab(s) orally once a day (03 Apr 2019 11:43)  pravastatin 40 mg oral tablet: 1 tab(s) orally once a day (03 Apr 2019 11:43)  rifAXIMin 550 mg oral tablet: 1 tab(s) orally 2 times a day (03 Apr 2019 11:43)  Vitamin D3 2000 intl units oral tablet: 1 tab(s) orally once a day (03 Apr 2019 11:43)      MEDICATIONS  (STANDING):  cholecalciferol 2000 Unit(s) Oral daily  dextrose 5%. 1000 milliLiter(s) (50 mL/Hr) IV Continuous <Continuous>  dextrose 50% Injectable 12.5 Gram(s) IV Push once  dextrose 50% Injectable 25 Gram(s) IV Push once  dextrose 50% Injectable 25 Gram(s) IV Push once  FIRST- Mouthwash  BLM 30 milliLiter(s) Swish and Swallow every 4 hours  gabapentin 200 milliGRAM(s) Oral four times a day  insulin glargine Injectable (LANTUS) 15 Unit(s) SubCutaneous at bedtime  insulin lispro (HumaLOG) corrective regimen sliding scale   SubCutaneous three times a day before meals  insulin lispro (HumaLOG) corrective regimen sliding scale   SubCutaneous at bedtime  lactulose Syrup 10 Gram(s) Oral four times a day  lidocaine   Patch 1 Patch Transdermal daily  ondansetron Injectable 4 milliGRAM(s) IV Push every 6 hours  pantoprazole  Injectable 40 milliGRAM(s) IV Push every 12 hours  potassium chloride    Tablet ER 10 milliEquivalent(s) Oral daily  rifaximin 550 milliGRAM(s) Oral two times a day  sucralfate 1 Gram(s) Oral every 6 hours  ursodiol Capsule 300 milliGRAM(s) Oral three times a day    MEDICATIONS  (PRN):  acetaminophen   Tablet .. 650 milliGRAM(s) Oral every 6 hours PRN Mild Pain (1 - 3)  dextrose 40% Gel 15 Gram(s) Oral once PRN Blood Glucose LESS THAN 70 milliGRAM(s)/deciliter  glucagon  Injectable 1 milliGRAM(s) IntraMuscular once PRN Glucose LESS THAN 70 milligrams/deciliter      Allergies    codeine (Anaphylaxis)    Intolerances        REVIEW OF SYSTEMS:  CONSTITUTIONAL: No fever, No chills, No fatigue, No myalgia, No Body ache, No Weakness  EYES: No eye pain,  No visual disturbances, No discharge, NO Redness  ENMT:  No ear pain, No nose bleed, No vertigo; No sinus or throat pain, No Congestion  NECK: No pain, No stiffness  RESPIRATORY: No cough, wheezing, No  hemoptysis, No shortness of breath  CARDIOVASCULAR: No chest pain, palpitations  GASTROINTESTINAL: No abdominal or epigastric pain. No nausea, No vomiting; No diarrhea or constipation. [  ] BM  GENITOURINARY: No dysuria, No frequency, No urgency, No hematuria, or incontinence  NEUROLOGICAL: No headaches, No dizziness, No numbness, No tingling, No tremors, No weakness  EXT: No Swelling, No Pain, No Edema  SKIN:  [  ] No itching, burning, rashes, or lesions   MUSCULOSKELETAL: No joint pain or swelling; No muscle pain, No back pain, No extremity pain  PSYCHIATRIC: No depression, anxiety, mood swings or difficulty sleeping at night  PAIN SCALE: [  ] None  [  ] Other-  ROS Unable to obtain due to - [  ] Dementia  [  ] Lethargy  [  ] Sedated [  ] non verbal  REST OF REVIEW Of SYSTEM - [  ] Normal     Vital Signs Last 24 Hrs  T(C): 36.4 (08 Apr 2019 05:51), Max: 36.9 (07 Apr 2019 21:35)  T(F): 97.5 (08 Apr 2019 05:51), Max: 98.5 (07 Apr 2019 21:35)  HR: 76 (08 Apr 2019 05:51) (76 - 86)  BP: 131/64 (08 Apr 2019 05:51) (128/76 - 146/74)  BP(mean): --  RR: 17 (08 Apr 2019 05:51) (15 - 17)  SpO2: 96% (08 Apr 2019 05:51) (96% - 100%)  Finger Stick          PHYSICAL EXAM:  GENERAL:  [  ] NAD , [  ] well appearing, [  ] Agitated, [  ] Drowsy,  [  ] Lethargy, [  ] confused   HEAD:  [  ] Normal, [  ] Other  EYES:  [  ] EOMI, [  ] PERRLA, [  ] conjunctiva and sclera clear normal, [  ] Other,  [  ] Pallor,[  ] Discharge  ENMT:  [  ] Normal, [  ] Moist mucous membranes, [  ] Good dentition, [  ] No Thrush  NECK:  [  ] Supple, [  ] No JVD, [  ] Normal thyroid, [  ] Lymphadenopathy [  ] Other  CHEST/LUNG:  [  ] Clear to auscultation bilaterally, [  ] Breath Sounds equal OR Decrease,  [  ] No rales, [  ] No rhonchi  [  ]  No wheezing  HEART:  [  ] Regular rate and rhythm, [  ] tachycardia, [  ] Bradycardia,  [  ] irregular  [  ] No murmurs, No rubs, No gallops, [  ] PPM in place (Mfr:  )  ABDOMEN:  [  ] Soft, [  ] Nontender, [  ] Nondistended, [  ] No mass, [  ] Bowel sounds present, [  ] obese  NERVOUS SYSTEM:  [  ] Alert & Oriented X3, [  ] Nonfocal  [  ] Confusion  [  ] Encephalopathic [  ] Sedated [  ] Unable to assess, [  ] Other-  EXTREMITIES: [  ] 2+ Peripheral Pulses, No clubbing, No cyanosis,  [  ] edema B/L lower EXT. [  ] PVD stasis skin changes B/L Lower EXT  LYMPH: No lymphadenopathy noted  SKIN:  [  ] No rashes or lesions, [  ] Pressure Ulcers, [  ] ecchymosis, [  ] Skin Tears, [  ] Other    DIET:     LABS:                        11.4   6.60  )-----------( 93       ( 08 Apr 2019 08:48 )             33.7     08 Apr 2019 08:48    143    |  110    |  13     ----------------------------<  79     3.4     |  25     |  0.88     Ca    8.3        08 Apr 2019 08:48    TPro  5.7    /  Alb  2.3    /  TBili  3.2    /  DBili  x      /  AST  35     /  ALT  26     /  AlkPhos  134    08 Apr 2019 08:48                             Anemia Panel:      Thyroid Panel:        Lipase, Serum: 52 U/L (04-03-19 @ 06:54)  Amylase, Serum Total: 26 U/L (04-03-19 @ 06:54)          RADIOLOGY & ADDITIONAL TESTS:      HEALTH ISSUES - PROBLEM Dx:  PUD (peptic ulcer disease): PUD (peptic ulcer disease)  Anemia: Anemia  Diabetes mellitus type 2, uncontrolled: Diabetes mellitus type 2, uncontrolled  Need for prophylactic measure: Need for prophylactic measure  Hypercholesteremia: Hypercholesteremia  Hypertension: Hypertension  Cirrhosis: Cirrhosis  GIB (gastrointestinal bleeding): GIB (gastrointestinal bleeding)  Diabetes: Diabetes  Elevated glucose: Elevated glucose  Lactic acidosis: Lactic acidosis  Hepatic encephalopathy: Hepatic encephalopathy  Abdominal pain: Abdominal pain  Gastroenteritis: Gastroenteritis          Consultant(s) Notes Reviewed:  [  ] YES     Care Discussed with [X] Consultants  [  ] Patient  [  ] Family  [  ]   [  ] Social Service  [  ] RN, [  ] Physical Therapy  DVT PPX: [  ] Lovenox, [  ] S C Heparin, [  ] Coumadin, [  ] Xarelto, [  ] Eliquis, [  ] Pradaxa, [  ] IV Heparin drip, [  ] SCD [  ] Contraindication 2 to GI Bleed,[  ] Ambulation  Advanced directive: [  ] None, [  ] DNR/DNI Patient is a 63y old  Male who presents with a chief complaint of n/v, abdominal pain (08 Apr 2019 09:03)      INTERVAL HPI: Patient is a 63 y/o M with PMHx of Type 2 DM on insulin, HTN, HLD, obesity, nephrolithiasis, neuropathy, Hepatic encephalopathy, NAFLD, cirrhosis, Hyperammonemia,  Gastritis/ esophagitis, recent admission to hospitals on 12/2018 for syncope, UGIB s/p EGD, duodenal ulcer, s/p clips and cauterization, and e. coli bacteremia, presented to ED after 2 days of abdominal pain with nausea and vomiting. Reported multiple episodes of brown colored emesis. Patient also reported recent BMs that are dark in color, no diarrhea. Patient stated that he f/u with GI outpatient on Monday without any significant concerns. Patient also reported his son at home is also sick at home recently with similar symptoms of nausea, vomiting, and abdominal pain. Patient denies any headache, dizziness, visual changes, chest pain, sob, gamez, diarrhea, dysuria, or hematuria.  As per pt he has NOT taken his insulin on Tuesday as he was vomiting    In the ED, initially VSS, most recent vs showed tachycardia 113bpm, and 150/78. Patient on room air  WBC wnl, h/h 12.6/36/8, plt 125  Initial lactate 7.2 --> 4.8 s/p 2 IVF bolus  Na 141, K 4.5, bicarb 23, anion gap serum 14, BUN 37, Cr 1.3, Glucose 399  albumin 2.4, Tbili 5, alk phos 273, Ast 42, GFR 58  lipase 52, amylase 26, serum acetone negative  UA; significant for RBC 25-50, WBC 6-10, protein 25, small ketones, moderate blood, granular cast, 1000 glucose  CT abdomen and pelvis w/ PO and IV contrast: Cirrhosis and portal hypertension. Mild, nonspecific haziness at the root of the small bowel mesentery; correlate clinically for pancreatitis. Pyloric and duodenal bulb thickening, recommend further clinical correlation for peptic ulcer disease.   EKG showed sinus tachy  Received 2x IVF bolus and 1x Zofran in ED.   4/5 /19 :Pt seen, examined, no acute events overnight, patient complains of GERD like sxs. FOBT + S/P EGD -Large non healing ulcer, No bleeding , MRI Abdomen today  4/6/19: Pt seen, Examined ,C/O Burning pain in esophagus & Stomach, on PO Diet, PPI. MRI Abdomen shows -Duodenitis  4/7/19 : Pt seen, Examined, feels OK, still c/o burning pain while eating, Stable H/H on PPI  4/8/19: pt seen and examined, feels ok, states his esophageal pain is subsiding, stable H/H on PPI        OVERNIGHT EVENTS: No acute events overnight, patient seen and examined at bedside, in no acute distress.     Home Medications:  aluminum hydroxide-magnesium hydroxide 200 mg-200 mg/5 mL oral suspension: 30 milliliter(s) orally 2 times a day (03 Apr 2019 11:43)  furosemide 20 mg oral tablet: 2 tab(s) orally once a day (03 Apr 2019 11:43)  insulin glargine: 50 unit(s) subcutaneous once a day (at bedtime) pt has pen (03 Apr 2019 11:43)  lactulose 10 g/15 mL oral syrup: 15 milliliter(s) orally 4 times a day (03 Apr 2019 11:43)  pantoprazole 40 mg oral delayed release tablet: 1 tab(s) orally once a day (03 Apr 2019 11:43)  phytonadione 100 mcg oral tablet: 1 tab(s) orally once a day (03 Apr 2019 11:43)  potassium chloride 10 mEq oral tablet, extended release: 1 tab(s) orally once a day (03 Apr 2019 11:43)  pravastatin 40 mg oral tablet: 1 tab(s) orally once a day (03 Apr 2019 11:43)  rifAXIMin 550 mg oral tablet: 1 tab(s) orally 2 times a day (03 Apr 2019 11:43)  Vitamin D3 2000 intl units oral tablet: 1 tab(s) orally once a day (03 Apr 2019 11:43)      MEDICATIONS  (STANDING):  cholecalciferol 2000 Unit(s) Oral daily  dextrose 5%. 1000 milliLiter(s) (50 mL/Hr) IV Continuous <Continuous>  dextrose 50% Injectable 12.5 Gram(s) IV Push once  dextrose 50% Injectable 25 Gram(s) IV Push once  dextrose 50% Injectable 25 Gram(s) IV Push once  FIRST- Mouthwash  BLM 30 milliLiter(s) Swish and Swallow every 4 hours  gabapentin 200 milliGRAM(s) Oral four times a day  insulin glargine Injectable (LANTUS) 15 Unit(s) SubCutaneous at bedtime  insulin lispro (HumaLOG) corrective regimen sliding scale   SubCutaneous three times a day before meals  insulin lispro (HumaLOG) corrective regimen sliding scale   SubCutaneous at bedtime  lactulose Syrup 10 Gram(s) Oral four times a day  lidocaine   Patch 1 Patch Transdermal daily  ondansetron Injectable 4 milliGRAM(s) IV Push every 6 hours  pantoprazole  Injectable 40 milliGRAM(s) IV Push every 12 hours  potassium chloride    Tablet ER 10 milliEquivalent(s) Oral daily  rifaximin 550 milliGRAM(s) Oral two times a day  sucralfate 1 Gram(s) Oral every 6 hours  ursodiol Capsule 300 milliGRAM(s) Oral three times a day    MEDICATIONS  (PRN):  acetaminophen   Tablet .. 650 milliGRAM(s) Oral every 6 hours PRN Mild Pain (1 - 3)  dextrose 40% Gel 15 Gram(s) Oral once PRN Blood Glucose LESS THAN 70 milliGRAM(s)/deciliter  glucagon  Injectable 1 milliGRAM(s) IntraMuscular once PRN Glucose LESS THAN 70 milligrams/deciliter      Allergies    codeine (Anaphylaxis)    Intolerances    REVIEW OF SYSTEMS:  CONSTITUTIONAL: No fever, No chills, No fatigue, No myalgia, No Body ache, No Weakness  EYES: No eye pain,  No visual disturbances, No discharge, NO Redness  ENMT:  No ear pain, No nose bleed, No vertigo; No sinus or throat pain, No Congestion  NECK: No pain, No stiffness  RESPIRATORY: No cough, wheezing, No  hemoptysis, No shortness of breath  CARDIOVASCULAR: No chest pain, palpitations  GASTROINTESTINAL: No abdominal or epigastric pain. No nausea, No vomiting; No diarrhea or constipation. [  ] BM  GENITOURINARY: No dysuria, No frequency, No urgency, No hematuria, or incontinence  NEUROLOGICAL: No headaches, No dizziness, No numbness, No tingling, No tremors, No weakness  EXT: No Swelling, No Pain, No Edema  SKIN:  [x  ] No itching, burning  MUSCULOSKELETAL: No joint pain or swelling; No muscle pain, No back pain, No extremity pain  PSYCHIATRIC: No depression, anxiety, mood swings or difficulty sleeping at night  PAIN SCALE: [  ] None  [ x ] Other 6/10 (esophageal)  ROS Unable to obtain due to - [  ] Dementia  [  ] Lethargy  [  ] Sedated [  ] non verbal  REST OF REVIEW Of SYSTEM - [ x ] Normal     Vital Signs Last 24 Hrs  T(C): 36.4 (08 Apr 2019 05:51), Max: 36.9 (07 Apr 2019 21:35)  T(F): 97.5 (08 Apr 2019 05:51), Max: 98.5 (07 Apr 2019 21:35)  HR: 76 (08 Apr 2019 05:51) (76 - 86)  BP: 131/64 (08 Apr 2019 05:51) (128/76 - 146/74)  BP(mean): --  RR: 17 (08 Apr 2019 05:51) (15 - 17)  SpO2: 96% (08 Apr 2019 05:51) (96% - 100%)  Finger Stick          PHYSICAL EXAM:  GENERAL:  [ x ] NAD , [x  ] well appearing, [  ] Agitated, [  ] Drowsy,  [  ] Lethargy, [  ] confused   HEAD:  [x  ] Normal, [  ] Other  EYES:  [ x ] EOMI, [x  ] PERRLA, [ x ] conjunctiva and sclera clear normal, [  ] Other,  [  ] Pallor,[  ] Discharge  ENMT:  [ x ] Normal, [ x ] Moist mucous membranes, [  ] Good dentition, [  ] No Thrush  NECK:  [ x ] Supple, [x  ] No JVD, [  ] Normal thyroid, [  ] Lymphadenopathy [  ] Other  CHEST/LUNG:  [x  ] Clear to auscultation bilaterally, [  ] Breath Sounds equal OR Decrease,  [x  ] No rales, [x  ] No rhonchi  [ x ]  No wheezing  HEART:  [ x ] Regular rate and rhythm, [  ] tachycardia, [  ] Bradycardia,  [  ] irregular  [  ] No murmurs, No rubs, No gallops, [  ] PPM in place (Mfr:  )  ABDOMEN:  [x  ] Soft, [  x] Nontender, [ x ] Nondistended, [  ] No mass, [ x ] Bowel sounds present, [ x ] obese  NERVOUS SYSTEM:  [  x] Alert & Oriented X3, [ x ] Nonfocal  [  ] Confusion  [  ] Encephalopathic [  ] Sedated [  ] Unable to assess, [  ] Other-  EXTREMITIES: [ x ] 2+ Peripheral Pulses, No clubbing, No cyanosis,  [  ] edema B/L lower EXT. [  ] PVD stasis skin changes B/L Lower EXT  LYMPH: No lymphadenopathy noted  SKIN:  [ x ] warm and well perfused, [  ] Pressure Ulcers, [  ] ecchymosis, [  ] Skin Tears, [  ] Other    DIET: consistent carb, DASH    LABS:                        11.4   6.60  )-----------( 93       ( 08 Apr 2019 08:48 )             33.7     08 Apr 2019 08:48    143    |  110    |  13     ----------------------------<  79     3.4     |  25     |  0.88     Ca    8.3        08 Apr 2019 08:48    TPro  5.7    /  Alb  2.3    /  TBili  3.2    /  DBili  x      /  AST  35     /  ALT  26     /  AlkPhos  134    08 Apr 2019 08:48    Lipase, Serum: 52 U/L (04-03-19 @ 06:54)  Amylase, Serum Total: 26 U/L (04-03-19 @ 06:54)    RADIOLOGY & ADDITIONAL TESTS:      HEALTH ISSUES - PROBLEM Dx:  PUD (peptic ulcer disease): PUD (peptic ulcer disease)  Anemia: Anemia  Diabetes mellitus type 2, uncontrolled: Diabetes mellitus type 2, uncontrolled  Need for prophylactic measure: Need for prophylactic measure  Hypercholesteremia: Hypercholesteremia  Hypertension: Hypertension  Cirrhosis: Cirrhosis  GIB (gastrointestinal bleeding): GIB (gastrointestinal bleeding)  Diabetes: Diabetes  Elevated glucose: Elevated glucose  Lactic acidosis: Lactic acidosis  Hepatic encephalopathy: Hepatic encephalopathy  Abdominal pain: Abdominal pain  Gastroenteritis: Gastroenteritis      Consultant(s) Notes Reviewed:  [  ] YES     Care Discussed with [X] Consultants  [  ] Patient  [  ] Family  [  ]   [  ] Social Service  [  ] RN, [  ] Physical Therapy  DVT PPX: [  ] Lovenox, [  ] S C Heparin, [  ] Coumadin, [  ] Xarelto, [  ] Eliquis, [  ] Pradaxa, [  ] IV Heparin drip, [  ] SCD [  ] Contraindication 2 to GI Bleed,[  ] Ambulation  Advanced directive: [  ] None, [  ] DNR/DNI Patient is a 63y old  Male who presents with a chief complaint of n/v, abdominal pain (08 Apr 2019 09:03)      INTERVAL HPI: Patient is a 61 y/o M with PMHx of Type 2 DM on insulin, HTN, HLD, obesity, nephrolithiasis, neuropathy, Hepatic encephalopathy, NAFLD, cirrhosis, Hyperammonemia,  Gastritis/ esophagitis, recent admission to [facility] on 12/2018 for syncope, UGIB s/p EGD, duodenal ulcer, s/p clips and cauterization, and e. coli bacteremia, presented to ED after 2 days of abdominal pain with nausea and vomiting. Reported multiple episodes of brown colored emesis. Patient also reported recent BMs that are dark in color, no diarrhea. Patient stated that he f/u with GI outpatient on Monday without any significant concerns. Patient also reported his son at home is also sick at home recently with similar symptoms of nausea, vomiting, and abdominal pain. Patient denies any headache, dizziness, visual changes, chest pain, sob, gamez, diarrhea, dysuria, or hematuria.  As per pt he has NOT taken his insulin on Tuesday as he was vomiting    In the ED, initially VSS, most recent vs showed tachycardia 113bpm, and 150/78. Patient on room air  WBC wnl, h/h 12.6/36/8, plt 125  Initial lactate 7.2 --> 4.8 s/p 2 IVF bolus  Na 141, K 4.5, bicarb 23, anion gap serum 14, BUN 37, Cr 1.3, Glucose 399  albumin 2.4, Tbili 5, alk phos 273, Ast 42, GFR 58  lipase 52, amylase 26, serum acetone negative  UA; significant for RBC 25-50, WBC 6-10, protein 25, small ketones, moderate blood, granular cast, 1000 glucose  CT abdomen and pelvis w/ PO and IV contrast: Cirrhosis and portal hypertension. Mild, nonspecific haziness at the root of the small bowel mesentery; correlate clinically for pancreatitis. Pyloric and duodenal bulb thickening, recommend further clinical correlation for peptic ulcer disease.   EKG showed sinus tachy  Received 2x IVF bolus and 1x Zofran in ED.   4/5 /19 :Pt seen, examined, no acute events overnight, patient complains of GERD like sxs. FOBT + S/P EGD -Large non healing ulcer, No bleeding , MRI Abdomen today  4/6/19: Pt seen, Examined ,C/O Burning pain in esophagus & Stomach, on PO Diet, PPI. MRI Abdomen shows -Duodenitis  4/7/19 : Pt seen, Examined, feels OK, still c/o burning pain while eating, Stable H/H on PPI  4/8/19: pt seen and examined, feels ok, states his esophageal pain is subsiding, stable H/H on PPI,   4/9/19: Pt seen, Examine, able to eat better today, stable for d/c home today on Mouth Wash.    OVERNIGHT EVENTS: No acute events overnight, patient seen and examined at bedside, in no acute distress.     Home Medications:  aluminum hydroxide-magnesium hydroxide 200 mg-200 mg/5 mL oral suspension: 30 milliliter(s) orally 2 times a day (03 Apr 2019 11:43)  furosemide 20 mg oral tablet: 2 tab(s) orally once a day (03 Apr 2019 11:43)  insulin glargine: 50 unit(s) subcutaneous once a day (at bedtime) pt has pen (03 Apr 2019 11:43)  lactulose 10 g/15 mL oral syrup: 15 milliliter(s) orally 4 times a day (03 Apr 2019 11:43)  pantoprazole 40 mg oral delayed release tablet: 1 tab(s) orally once a day (03 Apr 2019 11:43)  phytonadione 100 mcg oral tablet: 1 tab(s) orally once a day (03 Apr 2019 11:43)  potassium chloride 10 mEq oral tablet, extended release: 1 tab(s) orally once a day (03 Apr 2019 11:43)  pravastatin 40 mg oral tablet: 1 tab(s) orally once a day (03 Apr 2019 11:43)  rifAXIMin 550 mg oral tablet: 1 tab(s) orally 2 times a day (03 Apr 2019 11:43)  Vitamin D3 2000 intl units oral tablet: 1 tab(s) orally once a day (03 Apr 2019 11:43)      MEDICATIONS  (STANDING):  cholecalciferol 2000 Unit(s) Oral daily  dextrose 5%. 1000 milliLiter(s) (50 mL/Hr) IV Continuous <Continuous>  dextrose 50% Injectable 12.5 Gram(s) IV Push once  dextrose 50% Injectable 25 Gram(s) IV Push once  dextrose 50% Injectable 25 Gram(s) IV Push once  FIRST- Mouthwash  BLM 30 milliLiter(s) Swish and Swallow every 4 hours  gabapentin 200 milliGRAM(s) Oral four times a day  insulin glargine Injectable (LANTUS) 15 Unit(s) SubCutaneous at bedtime  insulin lispro (HumaLOG) corrective regimen sliding scale   SubCutaneous three times a day before meals  insulin lispro (HumaLOG) corrective regimen sliding scale   SubCutaneous at bedtime  lactulose Syrup 10 Gram(s) Oral four times a day  lidocaine   Patch 1 Patch Transdermal daily  ondansetron Injectable 4 milliGRAM(s) IV Push every 6 hours  pantoprazole  Injectable 40 milliGRAM(s) IV Push every 12 hours  potassium chloride    Tablet ER 10 milliEquivalent(s) Oral daily  rifaximin 550 milliGRAM(s) Oral two times a day  sucralfate 1 Gram(s) Oral every 6 hours  ursodiol Capsule 300 milliGRAM(s) Oral three times a day    MEDICATIONS  (PRN):  acetaminophen   Tablet .. 650 milliGRAM(s) Oral every 6 hours PRN Mild Pain (1 - 3)  dextrose 40% Gel 15 Gram(s) Oral once PRN Blood Glucose LESS THAN 70 milliGRAM(s)/deciliter  glucagon  Injectable 1 milliGRAM(s) IntraMuscular once PRN Glucose LESS THAN 70 milligrams/deciliter      Allergies    codeine (Anaphylaxis)    Intolerances    REVIEW OF SYSTEMS: i feel fine.  CONSTITUTIONAL: No fever, No chills, No fatigue, No myalgia, No Body ache, No Weakness  EYES: No eye pain,  No visual disturbances, No discharge, NO Redness  ENMT:  No ear pain, No nose bleed, No vertigo; No sinus or throat pain, No Congestion  NECK: No pain, No stiffness  RESPIRATORY: No cough, wheezing, No  hemoptysis, No shortness of breath  CARDIOVASCULAR: No chest pain, palpitations  GASTROINTESTINAL: No abdominal or epigastric pain. No nausea, No vomiting; No diarrhea or constipation. [  ] BM  GENITOURINARY: No dysuria, No frequency, No urgency, No hematuria, or incontinence  NEUROLOGICAL: No headaches, No dizziness, No numbness, No tingling, No tremors, No weakness  EXT: No Swelling, No Pain, No Edema  SKIN:  [x  ] No itching, burning  MUSCULOSKELETAL: No joint pain or swelling; No muscle pain, No back pain, No extremity pain  PSYCHIATRIC: No depression, anxiety, mood swings or difficulty sleeping at night  PAIN SCALE: [  ] None  [ x ] Other 6/10 (esophageal)  ROS Unable to obtain due to - [  ] Dementia  [  ] Lethargy  [  ] Sedated [  ] non verbal  REST OF REVIEW Of SYSTEM - [ x ] Normal     Vital Signs Last 24 Hrs  T(C): 36.4 (08 Apr 2019 05:51), Max: 36.9 (07 Apr 2019 21:35)  T(F): 97.5 (08 Apr 2019 05:51), Max: 98.5 (07 Apr 2019 21:35)  HR: 76 (08 Apr 2019 05:51) (76 - 86)  BP: 131/64 (08 Apr 2019 05:51) (128/76 - 146/74)  BP(mean): --  RR: 17 (08 Apr 2019 05:51) (15 - 17)  SpO2: 96% (08 Apr 2019 05:51) (96% - 100%)  Finger Stick      PHYSICAL EXAM:  GENERAL:  [ x ] NAD , [x  ] well appearing, [  ] Agitated, [  ] Drowsy,  [  ] Lethargy, [  ] confused   HEAD:  [x  ] Normal, [  ] Other  EYES:  [ x ] EOMI, [x  ] PERRLA, [ x ] conjunctiva and sclera clear normal, [  ] Other,  [  ] Pallor,[  ] Discharge  ENMT:  [ x ] Normal, [ x ] Moist mucous membranes, [  ] Good dentition, [  ] No Thrush  NECK:  [ x ] Supple, [x  ] No JVD, [ x ] Normal thyroid, [  ] Lymphadenopathy [  ] Other  CHEST/LUNG:  [x  ] Clear to auscultation bilaterally, [ x ] Breath Sounds equal B/L   [x  ] No rales, [x  ] No rhonchi  [ x ]  No wheezing  HEART:  [ x ] Regular rate and rhythm, [  ] tachycardia, [  ] Bradycardia,  [  ] irregular  [ x ] No murmurs, No rubs, No gallops, [  ] PPM in place (Mfr:  )  ABDOMEN:  [x  ] Soft, [  x] Nontender, [ x ] Nondistended, [ x ] No mass, [ x ] Bowel sounds present, [ x ] obese  NERVOUS SYSTEM:  [  x] Alert & Oriented X3, [ x ] Nonfocal  [  ] Confusion  [  ] Encephalopathic [  ] Sedated [  ] Unable to assess, [  ] Other-  EXTREMITIES: [ x ] 2+ Peripheral Pulses, No clubbing, No cyanosis,  [  ] edema B/L lower EXT. [  ] PVD stasis skin changes B/L Lower EXT  LYMPH: No lymphadenopathy noted  SKIN:  [ x ] warm and well perfused, [  ] Pressure Ulcers, [  ] ecchymosis, [  ] Skin Tears, [  ] Other    DIET: consistent carb, DASH    LABS:                        11.4   6.60  )-----------( 93       ( 08 Apr 2019 08:48 )             33.7     08 Apr 2019 08:48    143    |  110    |  13     ----------------------------<  79     3.4     |  25     |  0.88     Ca    8.3        08 Apr 2019 08:48    TPro  5.7    /  Alb  2.3    /  TBili  3.2    /  DBili  x      /  AST  35     /  ALT  26     /  AlkPhos  134    08 Apr 2019 08:48    Lipase, Serum: 52 U/L (04-03-19 @ 06:54)  Amylase, Serum Total: 26 U/L (04-03-19 @ 06:54)    RADIOLOGY & ADDITIONAL TESTS:      HEALTH ISSUES - PROBLEM Dx:  PUD (peptic ulcer disease): PUD (peptic ulcer disease)  Anemia: Anemia  Diabetes mellitus type 2, uncontrolled: Diabetes mellitus type 2, uncontrolled  Need for prophylactic measure: Need for prophylactic measure  Hypercholesteremia: Hypercholesteremia  Hypertension: Hypertension  Cirrhosis: Cirrhosis  GIB (gastrointestinal bleeding): GIB (gastrointestinal bleeding)  Diabetes: Diabetes  Elevated glucose: Elevated glucose  Lactic acidosis: Lactic acidosis  Hepatic encephalopathy: Hepatic encephalopathy  Abdominal pain: Abdominal pain  Gastroenteritis: Gastroenteritis    Consultant(s) Notes Reviewed:  [x  ] YES     Care Discussed with [X] Consultants  [ x ] Patient  [  ] Family  [  ]   [  ] Social Service  [ x ] RN, [  ] Physical Therapy  DVT PPX: [  ] Lovenox, [  ] S C Heparin, [  ] Coumadin, [  ] Xarelto, [  ] Eliquis, [  ] Pradaxa, [  ] IV Heparin drip, [x  ] SCD [  ] Contraindication 2 to GI Bleed,[  ] Ambulation  Advanced directive: [ x ] None, [  ] DNR/DNI

## 2019-04-08 NOTE — PROGRESS NOTE ADULT - ATTENDING COMMENTS
Advanced care planning was discussed with patient and family.  Advanced care planning forms were reviewed and discussed.  Risks, benefits and alternatives of gastroenterologic procedures were discussed in detail and all questions were answered.    30 minutes spent.
Advanced care planning was discussed with patient and family.  Advanced care planning forms were reviewed and discussed.  Risks, benefits and alternatives of gastroenterologic procedures were discussed in detail and all questions were answered.    30 minutes spent.
Pt seen, examined, case & care plan d/w pt, at detail.  D/W GI Dr Morales, EGD showed -Large Duodenal ulcer , Non bleeding   AM labs
Pt seen, examined, case & care plan d/w pt, at detail.  D/W GI Dr Morales, EGD showed -Large Duodenal ulcer , Non bleeding   AM labs  D/C Planning
Pt seen, examined, case & care plan d/w pt, residents at detail.  D/W Dr Morales at detail. stable for D/C home  Total D/C care time is 45 minutes.
Pt seen, examined, case & care plan d/w pt, residents at detail.  D/W GI Dr Morales, EGD showed -Large Duodenal ulcer , Non bleeding   Plan for MRI A with IV Contrast.  AM labs
Pt seen, Examined, case & care plan d/w pt, residents, GI Dr Morales at detail,   NPO after mid night for EGD in AM   Pt is medically stable for schedule EGD in AM   AM labs

## 2019-04-08 NOTE — PROGRESS NOTE ADULT - PROBLEM SELECTOR PLAN 4
-suspect JEAN, no ascites on CT- High Bili   -continue home med lactulose, ursodiol, rifaximin   -GI Consult: Dr. Franks  -monitor ammonia,   patient AOx3, monitor stool output

## 2019-04-08 NOTE — PROGRESS NOTE ADULT - ASSESSMENT
Patient is a 63 y/o M with PMHx of Type 2 DM on insulin, HTN, HLD, obesity, nephrolithiasis, neuropathy, Hepatic encephalopathy, NAFLD, cirrhosis, Hyperammonemia,  Gastritis/ esophagitis, recent admission to [facility] on 12/2018 for syncope, UGIB s/p EGD, duodenal ulcer, s/p clips and cauterization, and e. coli bacteremia, presented to ED after 2 days of abdominal pain with nausea and vomiting, admit for evaluation of abdominal pain, S/P EGD, esophagitis, PUD.

## 2019-04-08 NOTE — PROGRESS NOTE ADULT - PROBLEM SELECTOR PLAN 2
suspected JEAN  no ascites on CT; mri reviewed, no focal hepatic lesions  cont ppi, rifaximin, sandra, lactulose  trend lfts  avoid hepatotoxins  low na/pro diet  supportive care  will need eus as outpt to r/o carcinoma

## 2019-04-08 NOTE — DISCHARGE NOTE PROVIDER - NSDCFUADDINST_GEN_ALL_CORE_FT
Follow up with your PMD, Endocrinologist in 1 -2 weeks  Follow up with DR Morales next week Monday to schedule out pt EUS test.

## 2019-04-08 NOTE — PROGRESS NOTE ADULT - PROVIDER SPECIALTY LIST ADULT
Anesthesia
Endocrinology
Endocrinology
Gastroenterology
Internal Medicine
Endocrinology
Gastroenterology

## 2019-04-08 NOTE — PROGRESS NOTE ADULT - REASON FOR ADMISSION
n/v, abdominal pain

## 2019-04-08 NOTE — PROGRESS NOTE ADULT - PROBLEM SELECTOR PLAN 1
decrease lantus 15 units qhs  cont mod dose scale coverage qac/qhs  goal bg 100-180 in hosp setting  cont cons cho diet

## 2019-04-08 NOTE — PROGRESS NOTE ADULT - PROBLEM SELECTOR PLAN 2
-hx of GIB, reported brown colored emesis and dark color stools on admission  -FOBT positive  -S/P  EGD + Duodenal ulcer , H/o PUD & GI bleed  -trend H-H  -Protonix 40 mg  BID, Carafate QID  D/W DR Morales. -hx of GIB, reported brown colored emesis and dark color stools on admission  -FOBT positive  -S/P  EGD + Duodenal ulcer, H/o PUD & GI bleed- follow up with GI outpatient for follow-up  -trend H-H  -Protonix 40 mg  BID, Carafate QID  D/W DR Morales. -hx of GIB, reported brown colored emesis and dark color stools on admission  -FOBT positive  -S/P  EGD + Duodenal ulcer, H/o PUD & GI bleed- follow up with GI outpatient for follow-up  -trend H-H  -Protonix 40 mg  BID, Carafate QID  D/W DR Morales.  Mild Anemia & Thrombocytopenia -Chronic

## 2019-04-08 NOTE — DISCHARGE NOTE PROVIDER - PROVIDER TOKENS
PROVIDER:[TOKEN:[[id number]]] PROVIDER:[TOKEN:[[id number]]],FREE:[LAST:[ramez],FIRST:[ruddy],PHONE:[(   )    -],FAX:[(   )    -]] PROVIDER:[TOKEN:[[id number]]],FREE:[LAST:[ramez],FIRST:[ruddy],PHONE:[(   )    -],FAX:[(   )    -]],PROVIDER:[TOKEN:[[id number]]],PROVIDER:[TOKEN:[[id number]]]

## 2019-04-08 NOTE — DISCHARGE NOTE PROVIDER - CARE PROVIDER_API CALL
Javy Morales (DO)  Gastroenterology; Internal Medicine  [address] [place] [address]  Phone: [phone]  Fax: [phone]  Follow Up Time: Javy Morales (DO)  Gastroenterology; Internal Medicine  [address] [place] [address]  Phone: [phone]  Fax: [phone]  Follow Up Time:     ruddy myrick  Phone: (   )    -  Fax: (   )    -  Follow Up Time: Javy Morales (DO)  Gastroenterology; Internal Medicine  [address] [place] [address]  Phone: [phone]  Fax: [phone]  Follow Up Time:     ruddy myrick  Phone: (   )    -  Fax: (   )    -  Follow Up Time:     Hon LOGAN Rios (MD)  Hematology; Internal Medicine; Medical Oncology  [address], [address]  [place] [address]  Phone: [phone]  Fax: [phone]  Follow Up Time:     Perlman, Craig D (DO)  Medicine  [address], [address]  [place] [address]  Phone: [phone]  Fax: [phone]  Follow Up Time:

## 2019-04-08 NOTE — PROGRESS NOTE ADULT - PROBLEM SELECTOR PLAN 1
resolved  ct reviewed- ?pud  sp egd- severe grade d esophagitis, gastritis, small hh, and duodenal ulceration  f/u path  cont ppi/carafate  diet as tolerated  monitor exam

## 2019-04-08 NOTE — DISCHARGE NOTE NURSING/CASE MANAGEMENT/SOCIAL WORK - NSDCDPATPORTLINK_GEN_ALL_CORE
You can access the [facility] Patient Portal, offered by [facility], by registering with the following website: [email or web address]

## 2019-04-08 NOTE — PROGRESS NOTE ADULT - PROBLEM SELECTOR PLAN 1
-hx of GIB s/p clips and cauterization, reported brown color emesis and dark color stool FOBT +  -EGD - large Duodenal ulcer with esophagitis .  MRI Abdo -+ Duodenitis, Mild pancreatitis & Cirrhosis & Portal HTN   -Protonix, 40 mg  BID, ZOFRAN IV q 6 hrs & PRN Reglan  -Carafate 4x day  -Diet advanced to Regular  -Maalox q4 PRN dyspepsia -hx of GIB s/p clips and cauterization, reported brown color emesis and dark color stool FOBT +, Follow Biopy report as out pt with GI, next week  -EGD - large Duodenal ulcer with esophagitis .  MRI Abdo -+ Duodenitis, Mild pancreatitis & Cirrhosis & Portal HTN   -Protonix, 40 mg  BID, ZOFRAN IV q 6 hrs & PRN Reglan  -Carafate 4x day  -Diet advanced to Regular  -Maalox q4 PRN dyspepsia  -On Magic Moth Wash 4-6 x day  -Change to PO PPI 2x day with Carafate 4x day  -EUS as out pt

## 2019-04-08 NOTE — PROGRESS NOTE ADULT - PROBLEM SELECTOR PROBLEM 3
Hepatic encephalopathy
Cirrhosis
Diabetes
Lactic acidosis
Diabetes
Diabetes
Lactic acidosis

## 2019-04-08 NOTE — DISCHARGE NOTE PROVIDER - NSDCCPCAREPLAN_GEN_ALL_CORE_FT
PRINCIPAL DISCHARGE DIAGNOSIS  Diagnosis: Abdominal pain  Assessment and Plan of Treatment: you were found to have a non-healing duodenal ulcer. Please take protonix and carafate as prescribed. Please follow up with Dr. Morales within 1 week of hospital discharge.  You have an appointment scheduled for April 15th at 10:45AM, please call office to confirm. PRINCIPAL DISCHARGE DIAGNOSIS  Diagnosis: Abdominal pain  Assessment and Plan of Treatment: you were found to have a non-healing duodenal ulcer. Please take protonix and carafate as prescribed. Please follow up with Dr. Morales within 1 week of hospital discharge.  You have an appointment scheduled for April 15th at 10:45AM, please call office to confirm.      SECONDARY DISCHARGE DIAGNOSES  Diagnosis: HLD (hyperlipidemia)  Assessment and Plan of Treatment: Your statin was held for elevated liver enzymes. Please follow up with your PCP for a repeat CMP before restarting medication    Diagnosis: Hypertension  Assessment and Plan of Treatment: Please continue lasix as directed.    Diagnosis: Diabetes mellitus  Assessment and Plan of Treatment: Please continue Lantus 50 units every night at bedtime.    Diagnosis: Cirrhosis  Assessment and Plan of Treatment: Please continue to take lactulose, ursodiol, rifaximin. Please follow up with Dr. Morales.    Diagnosis: GIB (gastrointestinal bleeding)  Assessment and Plan of Treatment: You were found to have blood in your stool. An EGD showed a duodenal ulcer. Your hemoglobin was stable. Please continue protonix and carafate as directed. Please follow up with Dr. Morales PRINCIPAL DISCHARGE DIAGNOSIS  Diagnosis: Abdominal pain  Assessment and Plan of Treatment: you were found to have a non-healing duodenal ulcer. Please take protonix and carafate as prescribed. Please follow up with Dr. Morales within 1 week of hospital discharge.  You have an appointment scheduled for April 15th at 10:45AM, please call office to confirm.      SECONDARY DISCHARGE DIAGNOSES  Diagnosis: HLD (hyperlipidemia)  Assessment and Plan of Treatment: Your statin was held for elevated liver enzymes. Please resume taking it as an outpatient. Please follow up with your PCP within 1 week of discharge from hospital    Diagnosis: Hypertension  Assessment and Plan of Treatment: Please continue lasix as directed.    Diagnosis: Diabetes mellitus  Assessment and Plan of Treatment: Please continue Lantus 50 units every night at bedtime.    Diagnosis: Cirrhosis  Assessment and Plan of Treatment: Please continue to take lactulose, ursodiol, rifaximin and phytonadione. Please follow up with Dr. Morales.    Diagnosis: GIB (gastrointestinal bleeding)  Assessment and Plan of Treatment: You were found to have blood in your stool. An EGD showed a duodenal ulcer. Your hemoglobin was stable. Please continue protonix and carafate as directed. Please follow up with Dr. Morales PRINCIPAL DISCHARGE DIAGNOSIS  Diagnosis: Abdominal pain  Assessment and Plan of Treatment: Resolved , Mild Pancreatitis on MRI abdomen  -you were found to have a non-healing duodenal ulcer on Repeat EGD  - Please take protonix 2x day  and carafate 4x day  as prescribed.   -Please follow up with Dr. Morales within 1 week of hospital discharge,  You have an appointment scheduled for April 15th at 10:45AM, please call office to confirm.  -Out pt schedule for EUS -Endoscopic Ultrasound with Dr Morales      SECONDARY DISCHARGE DIAGNOSES  Diagnosis: GIB (gastrointestinal bleeding)  Assessment and Plan of Treatment: You were found to have  occult blood in your stool.  - An EGD showed a  large duodenal ulcer & Severe Esophagitis  -Continue Magic Mouth wash 4-6 x day as needed , follow up with GI in 1 week, slowly Taper the need to use of Magic Mouth wash  -Your hemoglobin was stable. CBC in 1 week  - Please continue protonix and carafate as directed. Please follow up with Dr. 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 Please resume taking it as an outpatient.   Please follow up with your PCP within 1 week of discharge from hospital    Diagnosis: Hypertension  Assessment and Plan of Treatment: Please continue lasix  20 mg daily as directed.    Diagnosis: Diabetes mellitus  Assessment and Plan of Treatment: Please continue Lantus 50 units every night at bedtime.  Check your FSBS & follow up with your Endocrinologist    Diagnosis: Cirrhosis  Assessment and Plan of Treatment: Please continue to take lactulose, ursodiol, rifaximin and phytonadione. Please follow up with Dr. Morales.

## 2019-04-08 NOTE — PROGRESS NOTE ADULT - PROBLEM SELECTOR PLAN 6
-hold home med statin for now, elevated liver enzymes -hold home med statin for now, elevated liver enzymes  Restart at home

## 2019-04-08 NOTE — PROGRESS NOTE ADULT - PROBLEM SELECTOR PROBLEM 2
Cirrhosis
Abdominal pain
GIB (gastrointestinal bleeding)

## 2019-04-16 ENCOUNTER — INPATIENT (INPATIENT)
Facility: HOSPITAL | Age: 64
LOS: 0 days | Discharge: HOME | DRG: 442 | End: 2019-04-17
Attending: INTERNAL MEDICINE | Admitting: INTERNAL MEDICINE
Payer: MEDICARE

## 2019-04-16 VITALS
SYSTOLIC BLOOD PRESSURE: 152 MMHG | DIASTOLIC BLOOD PRESSURE: 83 MMHG | TEMPERATURE: 98 F | RESPIRATION RATE: 18 BRPM | WEIGHT: 279.99 LBS | OXYGEN SATURATION: 95 % | HEART RATE: 70 BPM

## 2019-04-16 DIAGNOSIS — Z29.9 ENCOUNTER FOR PROPHYLACTIC MEASURES, UNSPECIFIED: ICD-10-CM

## 2019-04-16 DIAGNOSIS — K72.90 HEPATIC FAILURE, UNSPECIFIED WITHOUT COMA: ICD-10-CM

## 2019-04-16 DIAGNOSIS — K27.9 PEPTIC ULCER, SITE UNSPECIFIED, UNSPECIFIED AS ACUTE OR CHRONIC, WITHOUT HEMORRHAGE OR PERFORATION: ICD-10-CM

## 2019-04-16 DIAGNOSIS — E78.00 PURE HYPERCHOLESTEROLEMIA, UNSPECIFIED: ICD-10-CM

## 2019-04-16 DIAGNOSIS — D69.6 THROMBOCYTOPENIA, UNSPECIFIED: ICD-10-CM

## 2019-04-16 DIAGNOSIS — K21.0 GASTRO-ESOPHAGEAL REFLUX DISEASE WITH ESOPHAGITIS: ICD-10-CM

## 2019-04-16 DIAGNOSIS — E11.9 TYPE 2 DIABETES MELLITUS WITHOUT COMPLICATIONS: ICD-10-CM

## 2019-04-16 DIAGNOSIS — K92.2 GASTROINTESTINAL HEMORRHAGE, UNSPECIFIED: ICD-10-CM

## 2019-04-16 DIAGNOSIS — K76.0 FATTY (CHANGE OF) LIVER, NOT ELSEWHERE CLASSIFIED: ICD-10-CM

## 2019-04-16 DIAGNOSIS — K72.91 HEPATIC FAILURE, UNSPECIFIED WITH COMA: ICD-10-CM

## 2019-04-16 DIAGNOSIS — Z90.49 ACQUIRED ABSENCE OF OTHER SPECIFIED PARTS OF DIGESTIVE TRACT: Chronic | ICD-10-CM

## 2019-04-16 DIAGNOSIS — I10 ESSENTIAL (PRIMARY) HYPERTENSION: ICD-10-CM

## 2019-04-16 LAB
ALBUMIN SERPL ELPH-MCNC: 2.4 G/DL — LOW (ref 3.3–5)
ALBUMIN SERPL ELPH-MCNC: 2.4 G/DL — LOW (ref 3.3–5)
ALP SERPL-CCNC: 309 U/L — HIGH (ref 40–120)
ALP SERPL-CCNC: 334 U/L — HIGH (ref 40–120)
ALT FLD-CCNC: 23 U/L — SIGNIFICANT CHANGE UP (ref 12–78)
ALT FLD-CCNC: 24 U/L — SIGNIFICANT CHANGE UP (ref 12–78)
AMMONIA BLD-MCNC: 117 UMOL/L — HIGH (ref 11–32)
AMMONIA BLD-MCNC: 156 UMOL/L — HIGH (ref 11–32)
ANION GAP SERPL CALC-SCNC: 6 MMOL/L — SIGNIFICANT CHANGE UP (ref 5–17)
ANION GAP SERPL CALC-SCNC: 8 MMOL/L — SIGNIFICANT CHANGE UP (ref 5–17)
APPEARANCE UR: CLEAR — SIGNIFICANT CHANGE UP
AST SERPL-CCNC: 21 U/L — SIGNIFICANT CHANGE UP (ref 15–37)
AST SERPL-CCNC: 27 U/L — SIGNIFICANT CHANGE UP (ref 15–37)
BACTERIA # UR AUTO: ABNORMAL
BASOPHILS # BLD AUTO: 0.04 K/UL — SIGNIFICANT CHANGE UP (ref 0–0.2)
BASOPHILS # BLD AUTO: 0.05 K/UL — SIGNIFICANT CHANGE UP (ref 0–0.2)
BASOPHILS NFR BLD AUTO: 0.9 % — SIGNIFICANT CHANGE UP (ref 0–2)
BASOPHILS NFR BLD AUTO: 1 % — SIGNIFICANT CHANGE UP (ref 0–2)
BILIRUB SERPL-MCNC: 2 MG/DL — HIGH (ref 0.2–1.2)
BILIRUB SERPL-MCNC: 2.4 MG/DL — HIGH (ref 0.2–1.2)
BILIRUB UR-MCNC: ABNORMAL
BUN SERPL-MCNC: 11 MG/DL — SIGNIFICANT CHANGE UP (ref 7–23)
BUN SERPL-MCNC: 9 MG/DL — SIGNIFICANT CHANGE UP (ref 7–23)
CALCIUM SERPL-MCNC: 8.3 MG/DL — LOW (ref 8.5–10.1)
CALCIUM SERPL-MCNC: 8.4 MG/DL — LOW (ref 8.5–10.1)
CHLORIDE SERPL-SCNC: 110 MMOL/L — HIGH (ref 96–108)
CHLORIDE SERPL-SCNC: 112 MMOL/L — HIGH (ref 96–108)
CO2 SERPL-SCNC: 24 MMOL/L — SIGNIFICANT CHANGE UP (ref 22–31)
CO2 SERPL-SCNC: 27 MMOL/L — SIGNIFICANT CHANGE UP (ref 22–31)
COLOR SPEC: ABNORMAL
CREAT SERPL-MCNC: 0.76 MG/DL — SIGNIFICANT CHANGE UP (ref 0.5–1.3)
CREAT SERPL-MCNC: 0.91 MG/DL — SIGNIFICANT CHANGE UP (ref 0.5–1.3)
DIFF PNL FLD: ABNORMAL
EOSINOPHIL # BLD AUTO: 0.08 K/UL — SIGNIFICANT CHANGE UP (ref 0–0.5)
EOSINOPHIL # BLD AUTO: 0.13 K/UL — SIGNIFICANT CHANGE UP (ref 0–0.5)
EOSINOPHIL NFR BLD AUTO: 1.8 % — SIGNIFICANT CHANGE UP (ref 0–6)
EOSINOPHIL NFR BLD AUTO: 2.7 % — SIGNIFICANT CHANGE UP (ref 0–6)
EPI CELLS # UR: SIGNIFICANT CHANGE UP
GLUCOSE SERPL-MCNC: 203 MG/DL — HIGH (ref 70–99)
GLUCOSE SERPL-MCNC: 247 MG/DL — HIGH (ref 70–99)
GLUCOSE UR QL: 1000 MG/DL
HCT VFR BLD CALC: 34.2 % — LOW (ref 39–50)
HCT VFR BLD CALC: 34.4 % — LOW (ref 39–50)
HGB BLD-MCNC: 11.7 G/DL — LOW (ref 13–17)
HGB BLD-MCNC: 11.7 G/DL — LOW (ref 13–17)
IMM GRANULOCYTES NFR BLD AUTO: 0.2 % — SIGNIFICANT CHANGE UP (ref 0–1.5)
IMM GRANULOCYTES NFR BLD AUTO: 0.2 % — SIGNIFICANT CHANGE UP (ref 0–1.5)
KETONES UR-MCNC: NEGATIVE — SIGNIFICANT CHANGE UP
LACTATE SERPL-SCNC: 1.9 MMOL/L — SIGNIFICANT CHANGE UP (ref 0.7–2)
LEUKOCYTE ESTERASE UR-ACNC: NEGATIVE — SIGNIFICANT CHANGE UP
LYMPHOCYTES # BLD AUTO: 1.51 K/UL — SIGNIFICANT CHANGE UP (ref 1–3.3)
LYMPHOCYTES # BLD AUTO: 1.8 K/UL — SIGNIFICANT CHANGE UP (ref 1–3.3)
LYMPHOCYTES # BLD AUTO: 33.8 % — SIGNIFICANT CHANGE UP (ref 13–44)
LYMPHOCYTES # BLD AUTO: 37.7 % — SIGNIFICANT CHANGE UP (ref 13–44)
MCHC RBC-ENTMCNC: 32.4 PG — SIGNIFICANT CHANGE UP (ref 27–34)
MCHC RBC-ENTMCNC: 33.1 PG — SIGNIFICANT CHANGE UP (ref 27–34)
MCHC RBC-ENTMCNC: 34 GM/DL — SIGNIFICANT CHANGE UP (ref 32–36)
MCHC RBC-ENTMCNC: 34.2 GM/DL — SIGNIFICANT CHANGE UP (ref 32–36)
MCV RBC AUTO: 95.3 FL — SIGNIFICANT CHANGE UP (ref 80–100)
MCV RBC AUTO: 96.9 FL — SIGNIFICANT CHANGE UP (ref 80–100)
MONOCYTES # BLD AUTO: 0.41 K/UL — SIGNIFICANT CHANGE UP (ref 0–0.9)
MONOCYTES # BLD AUTO: 0.47 K/UL — SIGNIFICANT CHANGE UP (ref 0–0.9)
MONOCYTES NFR BLD AUTO: 9.2 % — SIGNIFICANT CHANGE UP (ref 2–14)
MONOCYTES NFR BLD AUTO: 9.9 % — SIGNIFICANT CHANGE UP (ref 2–14)
NEUTROPHILS # BLD AUTO: 2.31 K/UL — SIGNIFICANT CHANGE UP (ref 1.8–7.4)
NEUTROPHILS # BLD AUTO: 2.42 K/UL — SIGNIFICANT CHANGE UP (ref 1.8–7.4)
NEUTROPHILS NFR BLD AUTO: 48.5 % — SIGNIFICANT CHANGE UP (ref 43–77)
NEUTROPHILS NFR BLD AUTO: 54.1 % — SIGNIFICANT CHANGE UP (ref 43–77)
NITRITE UR-MCNC: NEGATIVE — SIGNIFICANT CHANGE UP
NRBC # BLD: 0 /100 WBCS — SIGNIFICANT CHANGE UP (ref 0–0)
NRBC # BLD: 0 /100 WBCS — SIGNIFICANT CHANGE UP (ref 0–0)
PH UR: 7 — SIGNIFICANT CHANGE UP (ref 5–8)
PLATELET # BLD AUTO: 86 K/UL — LOW (ref 150–400)
PLATELET # BLD AUTO: 92 K/UL — LOW (ref 150–400)
POTASSIUM SERPL-MCNC: 3.9 MMOL/L — SIGNIFICANT CHANGE UP (ref 3.5–5.3)
POTASSIUM SERPL-MCNC: 4.2 MMOL/L — SIGNIFICANT CHANGE UP (ref 3.5–5.3)
POTASSIUM SERPL-SCNC: 3.9 MMOL/L — SIGNIFICANT CHANGE UP (ref 3.5–5.3)
POTASSIUM SERPL-SCNC: 4.2 MMOL/L — SIGNIFICANT CHANGE UP (ref 3.5–5.3)
PROT SERPL-MCNC: 5.8 G/DL — LOW (ref 6–8.3)
PROT SERPL-MCNC: 5.8 G/DL — LOW (ref 6–8.3)
PROT UR-MCNC: NEGATIVE — SIGNIFICANT CHANGE UP
RBC # BLD: 3.53 M/UL — LOW (ref 4.2–5.8)
RBC # BLD: 3.61 M/UL — LOW (ref 4.2–5.8)
RBC # FLD: 16.6 % — HIGH (ref 10.3–14.5)
RBC # FLD: 16.8 % — HIGH (ref 10.3–14.5)
RBC CASTS # UR COMP ASSIST: ABNORMAL /HPF (ref 0–4)
SODIUM SERPL-SCNC: 143 MMOL/L — SIGNIFICANT CHANGE UP (ref 135–145)
SODIUM SERPL-SCNC: 144 MMOL/L — SIGNIFICANT CHANGE UP (ref 135–145)
SP GR SPEC: 1.01 — SIGNIFICANT CHANGE UP (ref 1.01–1.02)
UROBILINOGEN FLD QL: 4
WBC # BLD: 4.47 K/UL — SIGNIFICANT CHANGE UP (ref 3.8–10.5)
WBC # BLD: 4.77 K/UL — SIGNIFICANT CHANGE UP (ref 3.8–10.5)
WBC # FLD AUTO: 4.47 K/UL — SIGNIFICANT CHANGE UP (ref 3.8–10.5)
WBC # FLD AUTO: 4.77 K/UL — SIGNIFICANT CHANGE UP (ref 3.8–10.5)
WBC UR QL: NEGATIVE — SIGNIFICANT CHANGE UP

## 2019-04-16 PROCEDURE — 93010 ELECTROCARDIOGRAM REPORT: CPT

## 2019-04-16 PROCEDURE — 71045 X-RAY EXAM CHEST 1 VIEW: CPT | Mod: 26

## 2019-04-16 PROCEDURE — 70450 CT HEAD/BRAIN W/O DYE: CPT | Mod: 26

## 2019-04-16 PROCEDURE — 99285 EMERGENCY DEPT VISIT HI MDM: CPT

## 2019-04-16 RX ORDER — HEPARIN SODIUM 5000 [USP'U]/ML
5000 INJECTION INTRAVENOUS; SUBCUTANEOUS EVERY 8 HOURS
Qty: 0 | Refills: 0 | Status: DISCONTINUED | OUTPATIENT
Start: 2019-04-16 | End: 2019-04-17

## 2019-04-16 RX ORDER — INSULIN GLARGINE 100 [IU]/ML
50 INJECTION, SOLUTION SUBCUTANEOUS AT BEDTIME
Qty: 0 | Refills: 0 | Status: DISCONTINUED | OUTPATIENT
Start: 2019-04-16 | End: 2019-04-17

## 2019-04-16 RX ORDER — FUROSEMIDE 40 MG
40 TABLET ORAL DAILY
Qty: 0 | Refills: 0 | Status: DISCONTINUED | OUTPATIENT
Start: 2019-04-16 | End: 2019-04-17

## 2019-04-16 RX ORDER — LACTULOSE 10 G/15ML
30 SOLUTION ORAL EVERY 6 HOURS
Qty: 0 | Refills: 0 | Status: DISCONTINUED | OUTPATIENT
Start: 2019-04-16 | End: 2019-04-16

## 2019-04-16 RX ORDER — PANTOPRAZOLE SODIUM 20 MG/1
40 TABLET, DELAYED RELEASE ORAL
Qty: 0 | Refills: 0 | Status: DISCONTINUED | OUTPATIENT
Start: 2019-04-16 | End: 2019-04-17

## 2019-04-16 RX ORDER — DIPHENHYDRAMINE HYDROCHLORIDE AND LIDOCAINE HYDROCHLORIDE AND ALUMINUM HYDROXIDE AND MAGNESIUM HYDRO
30 KIT
Qty: 0 | Refills: 0 | Status: DISCONTINUED | OUTPATIENT
Start: 2019-04-16 | End: 2019-04-17

## 2019-04-16 RX ORDER — GLUCAGON INJECTION, SOLUTION 0.5 MG/.1ML
1 INJECTION, SOLUTION SUBCUTANEOUS ONCE
Qty: 0 | Refills: 0 | Status: DISCONTINUED | OUTPATIENT
Start: 2019-04-16 | End: 2019-04-17

## 2019-04-16 RX ORDER — URSODIOL 250 MG/1
300 TABLET, FILM COATED ORAL THREE TIMES A DAY
Qty: 0 | Refills: 0 | Status: DISCONTINUED | OUTPATIENT
Start: 2019-04-16 | End: 2019-04-17

## 2019-04-16 RX ORDER — INSULIN LISPRO 100/ML
VIAL (ML) SUBCUTANEOUS AT BEDTIME
Qty: 0 | Refills: 0 | Status: DISCONTINUED | OUTPATIENT
Start: 2019-04-16 | End: 2019-04-17

## 2019-04-16 RX ORDER — DEXTROSE 50 % IN WATER 50 %
12.5 SYRINGE (ML) INTRAVENOUS ONCE
Qty: 0 | Refills: 0 | Status: DISCONTINUED | OUTPATIENT
Start: 2019-04-16 | End: 2019-04-17

## 2019-04-16 RX ORDER — SUCRALFATE 1 G
1 TABLET ORAL EVERY 6 HOURS
Qty: 0 | Refills: 0 | Status: DISCONTINUED | OUTPATIENT
Start: 2019-04-16 | End: 2019-04-17

## 2019-04-16 RX ORDER — LACTULOSE 10 G/15ML
0 SOLUTION ORAL
Qty: 0 | Refills: 0 | COMMUNITY

## 2019-04-16 RX ORDER — LACTULOSE 10 G/15ML
10 SOLUTION ORAL
Qty: 0 | Refills: 0 | Status: DISCONTINUED | OUTPATIENT
Start: 2019-04-16 | End: 2019-04-16

## 2019-04-16 RX ORDER — LACTULOSE 10 G/15ML
30 SOLUTION ORAL ONCE
Qty: 0 | Refills: 0 | Status: COMPLETED | OUTPATIENT
Start: 2019-04-16 | End: 2019-04-16

## 2019-04-16 RX ORDER — GABAPENTIN 400 MG/1
200 CAPSULE ORAL
Qty: 0 | Refills: 0 | Status: DISCONTINUED | OUTPATIENT
Start: 2019-04-16 | End: 2019-04-17

## 2019-04-16 RX ORDER — DEXTROSE 50 % IN WATER 50 %
25 SYRINGE (ML) INTRAVENOUS ONCE
Qty: 0 | Refills: 0 | Status: DISCONTINUED | OUTPATIENT
Start: 2019-04-16 | End: 2019-04-17

## 2019-04-16 RX ORDER — ATORVASTATIN CALCIUM 80 MG/1
10 TABLET, FILM COATED ORAL AT BEDTIME
Qty: 0 | Refills: 0 | Status: DISCONTINUED | OUTPATIENT
Start: 2019-04-16 | End: 2019-04-17

## 2019-04-16 RX ORDER — CHOLECALCIFEROL (VITAMIN D3) 125 MCG
2000 CAPSULE ORAL DAILY
Qty: 0 | Refills: 0 | Status: DISCONTINUED | OUTPATIENT
Start: 2019-04-16 | End: 2019-04-17

## 2019-04-16 RX ORDER — DEXTROSE 50 % IN WATER 50 %
15 SYRINGE (ML) INTRAVENOUS ONCE
Qty: 0 | Refills: 0 | Status: DISCONTINUED | OUTPATIENT
Start: 2019-04-16 | End: 2019-04-17

## 2019-04-16 RX ORDER — ENOXAPARIN SODIUM 100 MG/ML
40 INJECTION SUBCUTANEOUS EVERY 24 HOURS
Qty: 0 | Refills: 0 | Status: DISCONTINUED | OUTPATIENT
Start: 2019-04-16 | End: 2019-04-16

## 2019-04-16 RX ORDER — LACTULOSE 10 G/15ML
45 SOLUTION ORAL EVERY 6 HOURS
Qty: 0 | Refills: 0 | Status: DISCONTINUED | OUTPATIENT
Start: 2019-04-16 | End: 2019-04-17

## 2019-04-16 RX ORDER — PHYTONADIONE (VIT K1) 5 MG
0.1 TABLET ORAL DAILY
Qty: 0 | Refills: 0 | Status: DISCONTINUED | OUTPATIENT
Start: 2019-04-16 | End: 2019-04-17

## 2019-04-16 RX ORDER — SODIUM CHLORIDE 9 MG/ML
1000 INJECTION INTRAMUSCULAR; INTRAVENOUS; SUBCUTANEOUS ONCE
Qty: 0 | Refills: 0 | Status: COMPLETED | OUTPATIENT
Start: 2019-04-16 | End: 2019-04-16

## 2019-04-16 RX ORDER — SODIUM CHLORIDE 9 MG/ML
1000 INJECTION, SOLUTION INTRAVENOUS
Qty: 0 | Refills: 0 | Status: DISCONTINUED | OUTPATIENT
Start: 2019-04-16 | End: 2019-04-17

## 2019-04-16 RX ORDER — POTASSIUM CHLORIDE 20 MEQ
10 PACKET (EA) ORAL DAILY
Qty: 0 | Refills: 0 | Status: DISCONTINUED | OUTPATIENT
Start: 2019-04-16 | End: 2019-04-17

## 2019-04-16 RX ORDER — INSULIN LISPRO 100/ML
VIAL (ML) SUBCUTANEOUS
Qty: 0 | Refills: 0 | Status: DISCONTINUED | OUTPATIENT
Start: 2019-04-16 | End: 2019-04-17

## 2019-04-16 RX ADMIN — Medication 1 GRAM(S): at 13:49

## 2019-04-16 RX ADMIN — HEPARIN SODIUM 5000 UNIT(S): 5000 INJECTION INTRAVENOUS; SUBCUTANEOUS at 23:07

## 2019-04-16 RX ADMIN — URSODIOL 300 MILLIGRAM(S): 250 TABLET, FILM COATED ORAL at 23:07

## 2019-04-16 RX ADMIN — Medication 10 MILLIEQUIVALENT(S): at 13:48

## 2019-04-16 RX ADMIN — LACTULOSE 30 GRAM(S): 10 SOLUTION ORAL at 13:44

## 2019-04-16 RX ADMIN — Medication 0.1 MILLIGRAM(S): at 13:46

## 2019-04-16 RX ADMIN — Medication 1: at 08:07

## 2019-04-16 RX ADMIN — LACTULOSE 30 GRAM(S): 10 SOLUTION ORAL at 06:36

## 2019-04-16 RX ADMIN — URSODIOL 300 MILLIGRAM(S): 250 TABLET, FILM COATED ORAL at 13:49

## 2019-04-16 RX ADMIN — SODIUM CHLORIDE 1000 MILLILITER(S): 9 INJECTION INTRAMUSCULAR; INTRAVENOUS; SUBCUTANEOUS at 01:35

## 2019-04-16 RX ADMIN — DIPHENHYDRAMINE HYDROCHLORIDE AND LIDOCAINE HYDROCHLORIDE AND ALUMINUM HYDROXIDE AND MAGNESIUM HYDRO 30 MILLILITER(S): KIT at 18:07

## 2019-04-16 RX ADMIN — ATORVASTATIN CALCIUM 10 MILLIGRAM(S): 80 TABLET, FILM COATED ORAL at 23:07

## 2019-04-16 RX ADMIN — INSULIN GLARGINE 50 UNIT(S): 100 INJECTION, SOLUTION SUBCUTANEOUS at 23:07

## 2019-04-16 RX ADMIN — Medication 1 GRAM(S): at 06:37

## 2019-04-16 RX ADMIN — PANTOPRAZOLE SODIUM 40 MILLIGRAM(S): 20 TABLET, DELAYED RELEASE ORAL at 08:08

## 2019-04-16 RX ADMIN — DIPHENHYDRAMINE HYDROCHLORIDE AND LIDOCAINE HYDROCHLORIDE AND ALUMINUM HYDROXIDE AND MAGNESIUM HYDRO 30 MILLILITER(S): KIT at 14:27

## 2019-04-16 RX ADMIN — Medication 2000 UNIT(S): at 13:46

## 2019-04-16 RX ADMIN — Medication 1 GRAM(S): at 18:09

## 2019-04-16 RX ADMIN — URSODIOL 300 MILLIGRAM(S): 250 TABLET, FILM COATED ORAL at 06:37

## 2019-04-16 RX ADMIN — Medication 1: at 18:04

## 2019-04-16 RX ADMIN — HEPARIN SODIUM 5000 UNIT(S): 5000 INJECTION INTRAVENOUS; SUBCUTANEOUS at 06:37

## 2019-04-16 RX ADMIN — Medication 1 GRAM(S): at 23:07

## 2019-04-16 RX ADMIN — LACTULOSE 45 GRAM(S): 10 SOLUTION ORAL at 18:07

## 2019-04-16 RX ADMIN — LACTULOSE 45 GRAM(S): 10 SOLUTION ORAL at 23:06

## 2019-04-16 RX ADMIN — GABAPENTIN 200 MILLIGRAM(S): 400 CAPSULE ORAL at 06:37

## 2019-04-16 RX ADMIN — GABAPENTIN 200 MILLIGRAM(S): 400 CAPSULE ORAL at 13:47

## 2019-04-16 RX ADMIN — Medication 40 MILLIGRAM(S): at 06:38

## 2019-04-16 RX ADMIN — GABAPENTIN 200 MILLIGRAM(S): 400 CAPSULE ORAL at 23:07

## 2019-04-16 RX ADMIN — GABAPENTIN 200 MILLIGRAM(S): 400 CAPSULE ORAL at 18:09

## 2019-04-16 RX ADMIN — DIPHENHYDRAMINE HYDROCHLORIDE AND LIDOCAINE HYDROCHLORIDE AND ALUMINUM HYDROXIDE AND MAGNESIUM HYDRO 30 MILLILITER(S): KIT at 23:06

## 2019-04-16 RX ADMIN — HEPARIN SODIUM 5000 UNIT(S): 5000 INJECTION INTRAVENOUS; SUBCUTANEOUS at 13:50

## 2019-04-16 RX ADMIN — LACTULOSE 30 GRAM(S): 10 SOLUTION ORAL at 03:20

## 2019-04-16 RX ADMIN — SODIUM CHLORIDE 1000 MILLILITER(S): 9 INJECTION INTRAMUSCULAR; INTRAVENOUS; SUBCUTANEOUS at 03:04

## 2019-04-16 NOTE — H&P ADULT - GASTROINTESTINAL DETAILS
normal/no rigidity/no distention/nontender/soft/no guarding no guarding/bowel sounds normal/no bruit/no distention/no rebound tenderness/no rigidity/no masses palpable/normal/soft/nontender/no organomegaly

## 2019-04-16 NOTE — PROGRESS NOTE ADULT - PROBLEM SELECTOR PLAN 1
-Admit to [facility]  -Ammonia level improving  initial elevation in ammonia likely 2/2 JEAN. Hyperammonemia is likely contributing to current AMS, in setting of medication noncompliance  No acute intracranial bleeding, mass effect, or shift. Mild chronic   microvascular ischemic changes.   -S/p lactulose 30g x1 in ED  -Cont lactulose 30g q6 hours  -cont home rifaximin, phytonadione, ursodiol  -GI Dr. Morales group consult  -Trend ammonia levels -Admit to [facility]  -Ammonia level improving, still Elevated   initial elevation in ammonia likely 2/2 JEAN. Hyperammonemia is likely contributing to current AMS, in setting of medication noncompliance  No acute intracranial bleeding, mass effect, or shift. Mild chronic   microvascular ischemic changes.   -S/p lactulose 30g x1 in ED  -Cont lactulose 30g q6 hours  -cont home rifaximin, phytonadione, ursodiol  -GI Dr. Morales D/W increase Lactulose 45 gm q 6 hrs  -Trend ammonia levels

## 2019-04-16 NOTE — ED ADULT TRIAGE NOTE - CHIEF COMPLAINT QUOTE
Per family pt has been increasingly weak and confused, family states that this usually happens when his ammonia levels are elevated. Pt has JEAN/ Cirrhosis of the liver of non alcoholic kind. Pt is on Lactulose everyday and per wife he is compliant with it.

## 2019-04-16 NOTE — CONSULT NOTE ADULT - SUBJECTIVE AND OBJECTIVE BOX
Chief Complaint:  Patient is a 63y old  Male who presents with a chief complaint of AMS (2019 09:45)    GIB (gastrointestinal bleeding)  GERD with esophagitis  Hepatic encephalopathy  Obesity  Fatty liver disease, nonalcoholic  Renal stones  Hypertension  Neuropathy  Hypercholesteremia  Diabetes  S/P cholecystectomy  No significant past surgical history     HPI:  64 y/o M with PMHx of Type 2 DM on insulin, HTN, HLD, obesity, nephrolithiasis, neuropathy, Hepatic encephalopathy, NAFLD (diagnosed 2018), cirrhosis, Hyperammonemia,  Gastritis/ esophagitis,  UGIB s/p EGD, duodenal ulcer, s/p clips and cauterization, and e. coli bacteremia BIB wife for AMS x1d. At the time of presentation no family members present at bedside and patient unable to provide a history due to AMS. The following history is obtained from the wife (Ada) via phone. She reports earlier in the pm prior to admission pt was very confused, was "trying to walk through walls" and thought "kitchen was the bathroom." Additionally, pt told wife he thought it was . Wife reports patient often gets episodes of confusion that correspond to high ammonia levels due to his NAFLD. She reports he has not been very compliant with medications. Wife called GI Dr. Morales earlier today, did not recieve callback from their office and so arrived straight to [facility]. She reports pt was fine earlier today prior to this event, and the current episode of AMS started with difficulty in word finding.  Of note, patient recently admitted to [facility] 4/3- for abd pain, pancreatitis, cirrhosis and portal HTN; GI workup found large duodenal ulcer with esophagitis for which he was treated supportively. At present, wife reports pt denies any fevers, chills, n/v/d, hematemesis, brbpr, black stools or any other symptoms.    In the ED vitals, T98.3, HR70, 152/83, 18, 95%RA  Labs significant for no leukocytosis, H/H 11.7/34.2 (baseline), thrombocytopenia (baseline). Lactate 1.9. Glucose 247, albumin 2.4, calcium 8.3, T bili 2, Alk phosp 334, ammonia 156    CXR (prelim) shows RLL infiltrates  CT head-No acute intracranial bleeding, mass effect, or shift. Mild chronic   microvascular ischemic changes.   EKG shows NSR 97 without ST/T changes    Given NS 1L x1, lactulose 30g x1, urine and blood cx sent (2019 03:16)      codeine (Anaphylaxis)      aluminum hydroxide/magnesium hydroxide/simethicone Suspension 30 milliLiter(s) Oral every 4 hours PRN  atorvastatin 10 milliGRAM(s) Oral at bedtime  cholecalciferol 2000 Unit(s) Oral daily  dextrose 40% Gel 15 Gram(s) Oral once PRN  dextrose 5%. 1000 milliLiter(s) IV Continuous <Continuous>  dextrose 50% Injectable 12.5 Gram(s) IV Push once  dextrose 50% Injectable 25 Gram(s) IV Push once  dextrose 50% Injectable 25 Gram(s) IV Push once  FIRST- Mouthwash  BLM 30 milliLiter(s) Swish and Spit four times a day  furosemide    Tablet 40 milliGRAM(s) Oral daily  gabapentin 200 milliGRAM(s) Oral four times a day  glucagon  Injectable 1 milliGRAM(s) IntraMuscular once PRN  heparin  Injectable 5000 Unit(s) SubCutaneous every 8 hours  insulin glargine Injectable (LANTUS) 50 Unit(s) SubCutaneous at bedtime  insulin lispro (HumaLOG) corrective regimen sliding scale   SubCutaneous three times a day before meals  insulin lispro (HumaLOG) corrective regimen sliding scale   SubCutaneous at bedtime  lactulose Syrup 30 Gram(s) Oral every 6 hours  pantoprazole    Tablet 40 milliGRAM(s) Oral before breakfast  phytonadione   Solution 0.1 milliGRAM(s) Oral daily  potassium chloride    Tablet ER 10 milliEquivalent(s) Oral daily  rifaximin 550 milliGRAM(s) Oral two times a day  sucralfate 1 Gram(s) Oral every 6 hours  ursodiol Capsule 300 milliGRAM(s) Oral three times a day        FAMILY HISTORY:  Family history of type 2 diabetes mellitus  Family history of hypertension  Family history of stomach cancer        Review of Systems:    General:  No wt loss, fevers, chills, night sweats,fatigue,   Eyes:  Good vision, no reported pain  ENT:  No sore throat, pain, runny nose, dysphagia  CV:  No pain, palpitatioins, hypo/hypertension  Resp:  No dyspnea, cough, tachypnea, wheezing  :  No pain, bleeding, incontinence, nocturia  Muscle:  No pain, weakness  Neuro:  No weakness, tingling, memory problems  Psych:  No fatigue, insomnia, mood problems, depression  Endocrine:  No polyuria, polydypsia, cold/heat intolerance  Heme:  No petechiae, ecchymosis, easy bruisability  Skin:  No rash, tattoos, scars, edema    Relevant Family History:       Relevant Social History:       Physical Exam:    Vital Signs:  Vital Signs Last 24 Hrs  T(C): 36.7 (2019 05:58), Max: 36.8 (2019 00:51)  T(F): 98 (2019 05:58), Max: 98.3 (2019 00:51)  HR: 100 (2019 05:58) (70 - 100)  BP: 140/62 (2019 05:58) (140/62 - 152/83)  BP(mean): --  RR: 16 (2019 05:58) (16 - 18)  SpO2: 99% (2019 05:58) (95% - 99%)  Daily     Daily     General:  Appears stated age, well-groomed, well-nourished, no distress  HEENT:  NC/AT,  conjunctivae clear and pink, no thyromegaly, nodules, adenopathy, no JVD  Chest:  Full & symmetric excursion, no increased effort, breath sounds clear  Cardiovascular:  Regular rhythm, S1, S2, no murmur/rub/S3/S4, no abdominal bruit, no edema  Abdomen:  Soft, non-tender, non-distended, normoactive bowel sounds,  no masses ,no hepatosplenomeagaly, no signs of chronic liver disease  Extremities:  no cyanosis,clubbing or edema  Skin:  No rash/erythema/ecchymoses/petechiae/wounds/abscess/warm/dry  Neuro/Psych:  Alert, oriented, no asterixis, no tremor, no encephalopathy    Laboratory:                            11.7   4.47  )-----------( 92       ( 2019 07:51 )             34.4     04-16    144  |  112<H>  |  9   ----------------------------<  203<H>  3.9   |  24  |  0.76    Ca    8.4<L>      2019 07:51    TPro  5.8<L>  /  Alb  2.4<L>  /  TBili  2.4<H>  /  DBili  x   /  AST  21  /  ALT  24  /  AlkPhos  309<H>  04-16    LIVER FUNCTIONS - ( 2019 07:51 )  Alb: 2.4 g/dL / Pro: 5.8 g/dL / ALK PHOS: 309 U/L / ALT: 24 U/L / AST: 21 U/L / GGT: x             Urinalysis Basic - ( 2019 05:54 )    Color: Laurel / Appearance: Clear / S.015 / pH: x  Gluc: x / Ketone: Negative  / Bili: Small / Urobili: 4   Blood: x / Protein: Negative / Nitrite: Negative   Leuk Esterase: Negative / RBC: 25-50 /HPF / WBC Negative   Sq Epi: x / Non Sq Epi: Occasional / Bacteria: Occasional      Amylase Serum--      Lipase serum--       [id number]  Amylase Serum--      Lipase serum--       [id number]    Imaging:

## 2019-04-16 NOTE — H&P ADULT - HISTORY OF PRESENT ILLNESS
[IN PROGRESS]    61 y/o M with PMHx of Type 2 DM on insulin, HTN, HLD, obesity, nephrolithiasis, neuropathy, Hepatic encephalopathy, NAFLD, cirrhosis, Hyperammonemia,  Gastritis/ esophagitis,  UGIB s/p EGD, duodenal ulcer, s/p clips and cauterization, and e. coli bacteremia presented with XXX      Of note, patient recently admitted to [facility] 4/3-4/8 for abd pain, pancreatitis, cirrhosis and portal HTN; GI workup found large duodenal ulcer with esophagitis for which he was treated supportively.    In the ED vitals, T98.3, HR70, 152/83, 18, 95%RA  Labs significant for no leukocytosis, H/H 11.7/34.2 (baseline), thrombocytopenia (baseline). Lactate 1.9. Glucose 247, albumin 2.4, calcium 8.3, T bili 2, Alk phosp 334, ammonia 156    CXR shows RLL infiltrates  CT head-No acute intracranial bleeding, mass effect, or shift. Mild chronic   microvascular ischemic changes.   EKG shows     Given NS 1L x1, lactulose 30g x1, urine and blood cx sent 64 y/o M with PMHx of Type 2 DM on insulin, HTN, HLD, obesity, nephrolithiasis, neuropathy, Hepatic encephalopathy, NAFLD (diagnosed 2/2018), cirrhosis, Hyperammonemia,  Gastritis/ esophagitis,  UGIB s/p EGD, duodenal ulcer, s/p clips and cauterization, and e. coli bacteremia BIB wife for AMS x1d. At the time of presentation no family members present at bedside and patient unable to provide a history due to AMS. The following history is obtained from the wife (Ada) via phone. She reports earlier in the pm prior to admission pt was very confused, was "trying to walk through walls" and thought "kitchen was the bathroom." Additionally, pt told wife he thought it was 2012. Wife reports patient often gets episodes of confusion that correspond to high ammonia levels due to his NAFLD. Wife called GI Dr. Morales earlier today, did not recieve callback from their office and so arrived straight to [facility]. She reports pt was fine earlier today prior to this event, and the current episode of AMS started with difficulty in word finding.  Of note, patient recently admitted to [facility] 4/3-4/8 for abd pain, pancreatitis, cirrhosis and portal HTN; GI workup found large duodenal ulcer with esophagitis for which he was treated supportively. At present, wife reports pt denies any fevers, chills, n/v/d, hematemesis, brbpr, black stools or any other symptoms.    In the ED vitals, T98.3, HR70, 152/83, 18, 95%RA  Labs significant for no leukocytosis, H/H 11.7/34.2 (baseline), thrombocytopenia (baseline). Lactate 1.9. Glucose 247, albumin 2.4, calcium 8.3, T bili 2, Alk phosp 334, ammonia 156    CXR (prelim) shows RLL infiltrates  CT head-No acute intracranial bleeding, mass effect, or shift. Mild chronic   microvascular ischemic changes.   EKG shows NSR 97 without ST/T changes    Given NS 1L x1, lactulose 30g x1, urine and blood cx sent 64 y/o M with PMHx of Type 2 DM on insulin, HTN, HLD, obesity, nephrolithiasis, neuropathy, Hepatic encephalopathy, NAFLD (diagnosed 2/2018), cirrhosis, Hyperammonemia,  Gastritis/ esophagitis,  UGIB s/p EGD, duodenal ulcer, s/p clips and cauterization, and e. coli bacteremia BIB wife for AMS x1d. At the time of presentation no family members present at bedside and patient unable to provide a history due to AMS. The following history is obtained from the wife (Ada) via phone. She reports earlier in the pm prior to admission pt was very confused, was "trying to walk through walls" and thought "kitchen was the bathroom." Additionally, pt told wife he thought it was 2012. Wife reports patient often gets episodes of confusion that correspond to high ammonia levels due to his NAFLD. She reports he has not been very compliant with medications. Wife called GI Dr. Morales earlier today, did not recieve callback from their office and so arrived straight to [facility]. She reports pt was fine earlier today prior to this event, and the current episode of AMS started with difficulty in word finding.  Of note, patient recently admitted to [facility] 4/3-4/8 for abd pain, pancreatitis, cirrhosis and portal HTN; GI workup found large duodenal ulcer with esophagitis for which he was treated supportively. At present, wife reports pt denies any fevers, chills, n/v/d, hematemesis, brbpr, black stools or any other symptoms.    In the ED vitals, T98.3, HR70, 152/83, 18, 95%RA  Labs significant for no leukocytosis, H/H 11.7/34.2 (baseline), thrombocytopenia (baseline). Lactate 1.9. Glucose 247, albumin 2.4, calcium 8.3, T bili 2, Alk phosp 334, ammonia 156    CXR (prelim) shows RLL infiltrates  CT head-No acute intracranial bleeding, mass effect, or shift. Mild chronic   microvascular ischemic changes.   EKG shows NSR 97 without ST/T changes    Given NS 1L x1, lactulose 30g x1, urine and blood cx sent

## 2019-04-16 NOTE — PROGRESS NOTE ADULT - PROBLEM SELECTOR PLAN 10
IMPROVE VTE Individual Risk Assessment  RISK                                                                Points    [  ] Previous VTE                                                  3  [  x] Thrombophilia                                               2  [  ] Lower limb paralysis                                      2        (unable to hold up >15 seconds)    [  ] Current Cancer                                              2         (within 6 months)  [  ] Immobilization > 24 hrs                                1  [  ] ICU/CCU stay > 24 hours                              1  [  x] Age > 60                                                      1  IMPROVE VTE Score ____3_____    DVT ppx with heparin  PT consult

## 2019-04-16 NOTE — PROGRESS NOTE ADULT - PROBLEM SELECTOR PLAN 5
Plan as above; bx EGD results noted;  large Duodenal ulcer with esophagitis during last admission 2 wks prior. 4/9 biopsy results 1. Duodenum, ulcer edge biopsy:  Peptic duodenitis with reactive cellular changes. Gastric surface epithelial metaplasia.  Stomach, antrum, biopsy: Chronic inactive gastritis involving gastric antrum and  body-type mucosa. H. Pylori is negative.  cont ppi, carafate, maalox prn

## 2019-04-16 NOTE — PROGRESS NOTE ADULT - PROBLEM SELECTOR PLAN 8
-Chronic likely 2/2 to liver disease as above. PLT on admission 86.   with normocytic anemia H/H 11.7/34.2 (baseline) likely also 2/2 to chronic disease

## 2019-04-16 NOTE — H&P ADULT - PROBLEM SELECTOR PLAN 9
-As per discussion with wife, patient possibly noncompliant with medications. Glucose 247  -Start low dose sliding scale with 50 units lantus QHS  -CCarb  -hypoglycemic protocol   -cont home gabapentin 200 mg qid  -A1c 10.5 4/4/19 noted -As per discussion with wife, patient possibly noncompliant with medications. Glucose 247  -Start low dose sliding scale with 50 units Lantus QHS  -CCarb  -hypoglycemic protocol   -cont home gabapentin 200 mg qid  -A1c 10.5 4/4/19 noted Plan as above; bx EGD results noted;  large Duodenal ulcer with esophagitis during last admission 2 wks prior. 4/9 biopsy results 1. Duodenum, ulcer edge biopsy:  Peptic duodenitis with reactive cellular changes.  .gastric surface epithelial metaplasia.  Stomach, antrum, biopsy: Chronic inactive gastritis involving gastric antrum and  body-type mucosa. H. Pylori is negative.  cont ppi, carafate, maalox prn

## 2019-04-16 NOTE — PROGRESS NOTE ADULT - ASSESSMENT
62 y/o M with PMHx of Type 2 DM on insulin, HTN, HLD, obesity, nephrolithiasis, neuropathy, Hepatic encephalopathy, NAFLD (diagnosed 2/2018), cirrhosis, Hyperammonemia,  Gastritis/ esophagitis,  UGIB s/p EGD, duodenal ulcer, s/p clips and cauterization, and e. coli bacteremia BIB wife for AMS x1d. Admitted for AMS due to hyperammonemia likely 2/2 to cirrhotic JEAN. 62 y/o M with PMHx of Type 2 DM on insulin, HTN, HLD, obesity, nephrolithiasis, neuropathy, Hepatic encephalopathy, NAFLD (diagnosed 2/2018), cirrhosis, Hyperammonemia,  Gastritis/ esophagitis,  FOBT + s/p EGD, duodenal ulcer, s/p clips and cauterization,  BIB wife for AMS x1d. Admitted for AMS due to hyperammonemia likely 2/2 to cirrhotic JEAN.

## 2019-04-16 NOTE — PROGRESS NOTE ADULT - SUBJECTIVE AND OBJECTIVE BOX
Patient is a 63y old  Male who presents with a chief complaint of AMS (2019 03:16)      INTERVAL HPI: 62 y/o M with PMHx of Type 2 DM on insulin, HTN, HLD, obesity, nephrolithiasis, neuropathy, Hepatic encephalopathy, NAFLD (diagnosed 2018), cirrhosis, Hyperammonemia,  Gastritis/ esophagitis,  UGIB s/p EGD, duodenal ulcer, s/p clips and cauterization, and e. coli bacteremia BIB wife for AMS x1d. At the time of presentation no family members present at bedside and patient unable to provide a history due to AMS. The following history is obtained from the wife (Ada) via phone. She reports earlier in the pm prior to admission pt was very confused, was "trying to walk through walls" and thought "kitchen was the bathroom." Additionally, pt told wife he thought it was . Wife reports patient often gets episodes of confusion that correspond to high ammonia levels due to his NAFLD. She reports he has not been very compliant with medications. Wife called GI Dr. Morales earlier today, did not recieve callback from their office and so arrived straight to [facility]. She reports pt was fine earlier today prior to this event, and the current episode of AMS started with difficulty in word finding.  Of note, patient recently admitted to [facility] 4/3- for abd pain, pancreatitis, cirrhosis and portal HTN; GI workup found large duodenal ulcer with esophagitis for which he was treated supportively. At present, wife reports pt denies any fevers, chills, n/v/d, hematemesis, brbpr, black stools or any other symptoms.    In the ED vitals, T98.3, HR70, 152/83, 18, 95%RA  Labs significant for no leukocytosis, H/H 11.7/34.2 (baseline), thrombocytopenia (baseline). Lactate 1.9. Glucose 247, albumin 2.4, calcium 8.3, T bili 2, Alk phosp 334, ammonia 156    CXR (prelim) shows RLL infiltrates  CT head-No acute intracranial bleeding, mass effect, or shift. Mild chronic   microvascular ischemic changes.   EKG shows NSR 97 without ST/T changes    Given NS 1L x1, lactulose 30g x1, urine and blood cx sent    19: pt seen and examined at bedside, no acute distress. AAOx1 to only his name. When asked how his week at home was, he mentioned that it was not good due to the people. He was unable to elaborate more about his home situation. Patient denies headache, SOB, CP, or abd pain.     Home Medications:  furosemide 20 mg oral tablet: 2 tab(s) orally once a day  restart at home (2019 04:11)  gabapentin 100 mg oral capsule: 2 cap(s) orally 4 times a day (2019 04:11)  insulin glargine: 50 unit(s) subcutaneous once a day (at bedtime) pt has pen (2019 04:11)  lactulose 10 g/15 mL oral syrup: 15 milliliter(s) orally 4 times a day (2019 04:11)  phytonadione 100 mcg oral tablet: 1 tab(s) orally once a day (2019 04:11)  potassium chloride 10 mEq oral tablet, extended release: 1 tab(s) orally once a day (2019 04:11)  pravastatin 40 mg oral tablet: 1 tab(s) orally once a day (2019 04:11)  rifAXIMin 550 mg oral tablet: 1 tab(s) orally 2 times a day (2019 04:11)  Vitamin D3 2000 intl units oral tablet: 1 tab(s) orally once a day (2019 04:11)      MEDICATIONS  (STANDING):  atorvastatin 10 milliGRAM(s) Oral at bedtime  cholecalciferol 2000 Unit(s) Oral daily  dextrose 5%. 1000 milliLiter(s) (50 mL/Hr) IV Continuous <Continuous>  dextrose 50% Injectable 12.5 Gram(s) IV Push once  dextrose 50% Injectable 25 Gram(s) IV Push once  dextrose 50% Injectable 25 Gram(s) IV Push once  FIRST- Mouthwash  BLM 30 milliLiter(s) Swish and Spit four times a day  furosemide    Tablet 40 milliGRAM(s) Oral daily  gabapentin 200 milliGRAM(s) Oral four times a day  heparin  Injectable 5000 Unit(s) SubCutaneous every 8 hours  insulin glargine Injectable (LANTUS) 50 Unit(s) SubCutaneous at bedtime  insulin lispro (HumaLOG) corrective regimen sliding scale   SubCutaneous three times a day before meals  insulin lispro (HumaLOG) corrective regimen sliding scale   SubCutaneous at bedtime  lactulose Syrup 30 Gram(s) Oral every 6 hours  pantoprazole    Tablet 40 milliGRAM(s) Oral before breakfast  phytonadione   Solution 0.1 milliGRAM(s) Oral daily  potassium chloride    Tablet ER 10 milliEquivalent(s) Oral daily  rifaximin 550 milliGRAM(s) Oral two times a day  sucralfate 1 Gram(s) Oral every 6 hours  ursodiol Capsule 300 milliGRAM(s) Oral three times a day    MEDICATIONS  (PRN):  aluminum hydroxide/magnesium hydroxide/simethicone Suspension 30 milliLiter(s) Oral every 4 hours PRN Dyspepsia  dextrose 40% Gel 15 Gram(s) Oral once PRN Blood Glucose LESS THAN 70 milliGRAM(s)/deciliter  glucagon  Injectable 1 milliGRAM(s) IntraMuscular once PRN Glucose LESS THAN 70 milligrams/deciliter      Allergies    codeine (Anaphylaxis)    Intolerances    REVIEW OF SYSTEMS:  CONSTITUTIONAL: No fever, No chills, No fatigue, No myalgia, no lightheadedness  EYES: No eye pain,  no headache, No visual disturbances, No discharge, NO Redness  ENMT:  No ear pain, No nose bleed, No vertigo; No sinus or throat pain, No Congestion  NECK: No pain, No stiffness  RESPIRATORY: No cough, wheezing, No  hemoptysis, No shortness of breath  CARDIOVASCULAR: No chest pain, palpitations  GASTROINTESTINAL: No abdominal or epigastric pain. No nausea, No vomiting; No diarrhea or constipation. [  ] BM  GENITOURINARY: No dysuria, No frequency, No urgency, No hematuria, or incontinence  NEUROLOGICAL: No headaches, No dizziness, No numbness, No tingling, No tremors, No weakness  EXT: No Swelling, No Pain, No Edema  SKIN:  [ x ] No itching, burning  MUSCULOSKELETAL: No joint pain or swelling; No muscle pain, No back pain, No extremity pain  PAIN SCALE: [ x ] None  [  ] Other-  ROS Unable to obtain due to - [  ] Dementia  [  ] Lethargy  [  ] Sedated [  ] non verbal  REST OF REVIEW Of SYSTEM - [ x ] Normal     Vital Signs Last 24 Hrs  T(C): 36.7 (2019 05:58), Max: 36.8 (2019 00:51)  T(F): 98 (2019 05:58), Max: 98.3 (2019 00:51)  HR: 100 (2019 05:58) (70 - 100)  BP: 140/62 (2019 05:58) (140/62 - 152/83)  BP(mean): --  RR: 16 (2019 05:58) (16 - 18)  SpO2: 99% (2019 05:58) (95% - 99%)  Finger Stick    PHYSICAL EXAM:  GENERAL:  [x  ] NAD , [ x ] well appearing, [  ] Agitated, [  ] Drowsy,  [  ] Lethargy, [  ] confused   HEAD:  [x  ] Normal, [  ] Other  EYES:  [x  ] EOMI, [ x ] PERRLA, [x  ] conjunctiva and sclera clear normal, [  ] Other,  [  ] Pallor,[  ] Discharge  ENMT:  [x  ] Normal, [ x ] Moist mucous membranes, [  ] Good dentition, [  ] No Thrush  NECK:  [x  ] Supple, [x  ] No JVD, [  ] Normal thyroid, [  ] Lymphadenopathy [  ] Other  CHEST/LUNG:  [ x ] Clear to auscultation bilaterally, [ x ] Breath Sounds equal b/l,  [ x ] No rales, [x  ] No rhonchi  [ x ]  No wheezing  HEART:  [x  ] Regular rate and rhythm, [  ] tachycardia, [  ] Bradycardia,  [  ] irregular  [ x ] No murmurs, No rubs, No gallops, [  ] PPM in place (Mfr:  )  ABDOMEN: obese [ x ] Soft, [ x ] Nontender, [  ] Nondistended, [  ] No mass, [ x ] Bowel sounds present, [  ] obese  NERVOUS SYSTEM:  [ x ] Alert & Oriented X1, [ x ] Nonfocal  [  ] Confusion  [  ] Encephalopathic [  ] Sedated [  ] Unable to assess, [  ] Other-  EXTREMITIES: [ x ] 2+ Peripheral Pulses, No clubbing, No cyanosis,  [  ] edema B/L lower EXT. [  ] PVD stasis skin changes B/L Lower EXT  LYMPH: No lymphadenopathy noted  SKIN:  [ x ] warm and well perfused, [  ] Pressure Ulcers, [  ] ecchymosis, [  ] Skin Tears, [  ] Other    DIET:  Consistent Carb    LABS:                        11.7   4.47  )-----------( 92       ( 2019 07:51 )             34.4     2019 07:51    144    |  112    |  9      ----------------------------<  203    3.9     |  24     |  0.76     Ca    8.4        2019 07:51    TPro  5.8    /  Alb  2.4    /  TBili  2.4    /  DBili  x      /  AST  21     /  ALT  24     /  AlkPhos  309    2019 07:51      Urinalysis Basic - ( 2019 05:54 )    Color: Laurel / Appearance: Clear / S.015 / pH: x  Gluc: x / Ketone: Negative  / Bili: Small / Urobili: 4   Blood: x / Protein: Negative / Nitrite: Negative   Leuk Esterase: Negative / RBC: 25-50 /HPF / WBC Negative   Sq Epi: x / Non Sq Epi: Occasional / Bacteria: Occasional    RADIOLOGY & ADDITIONAL TESTS:    HEALTH ISSUES - PROBLEM Dx:  Need for prophylactic measure: Need for prophylactic measure  Diabetes: Diabetes  Thrombocytopenia: Thrombocytopenia  Hypercholesteremia: Hypercholesteremia  Hypertension: Hypertension  GERD with esophagitis: GERD with esophagitis  PUD (peptic ulcer disease): PUD (peptic ulcer disease)  GIB (gastrointestinal bleeding): GIB (gastrointestinal bleeding)  Fatty liver disease, nonalcoholic: Fatty liver disease, nonalcoholic  Hepatic encephalopathy: Hepatic encephalopathy    Consultant(s) Notes Reviewed:  [ x ] YES     Care Discussed with [X] Consultants  [  ] Patient  [  ] Family  [  ]   [  ] Social Service  [  ] RN, [  ] Physical Therapy  DVT PPX: [  ] Lovenox, [x  ] S C Heparin, [  ] Coumadin, [  ] Xarelto, [  ] Eliquis, [  ] Pradaxa, [  ] IV Heparin drip, [  ] SCD [  ] Contraindication 2 to GI Bleed,[  ] Ambulation  Advanced directive: [  ] None, [  ] DNR/DNI Patient is a 63y old  Male who presents with a chief complaint of AMS (2019 03:16)      INTERVAL HPI: 64 y/o M with PMHx of Type 2 DM on insulin, HTN, HLD, obesity, nephrolithiasis, neuropathy, Hepatic encephalopathy, NAFLD (diagnosed 2018), cirrhosis, Hyperammonemia,  Gastritis/ esophagitis,  FOBT+ s/p EGD, duodenal ulcer, s/p clips and cauterization, and e. coli bacteremia BIB wife for AMS x1d. At the time of presentation no family members present at bedside and patient unable to provide a history due to AMS. The following history is obtained from the wife (Ada) via phone. She reports earlier in the pm prior to admission pt was very confused, was "trying to walk through walls" and thought "kitchen was the bathroom." Additionally, pt told wife he thought it was . Wife reports patient often gets episodes of confusion that correspond to high ammonia levels due to his NAFLD. She reports he has not been very compliant with medications. Wife called GI Dr. Morales earlier today, did not recieve callback from their office and so arrived straight to [facility]. She reports pt was fine earlier today prior to this event, and the current episode of AMS started with difficulty in word finding.  Of note, patient recently admitted to [facility] 4/3- for abd pain, pancreatitis, cirrhosis and portal HTN; GI workup found large duodenal ulcer with esophagitis for which he was treated supportively. At present, wife reports pt denies any fevers, chills, n/v/d, hematemesis, brbpr, black stools or any other symptoms.    In the ED vitals, T98.3, HR70, 152/83, 18, 95%RA  Labs significant for no leukocytosis, H/H 11.7/34.2 (baseline), thrombocytopenia (baseline). Lactate 1.9. Glucose 247, albumin 2.4, calcium 8.3, T bili 2, Alk phosp 334, ammonia 156    CXR (prelim) shows RLL infiltrates  CT head-No acute intracranial bleeding, mass effect, or shift. Mild chronic   microvascular ischemic changes.   EKG shows NSR 97 without ST/T changes    Given NS 1L x1, lactulose 30g x1, urine and blood cx sent    19: pt seen and examined at bedside, no acute distress. AAOx1 to only his name. When asked how his week at home was, he mentioned that it was not good due to the people. He was unable to elaborate more about his home situation. Patient denies headache, SOB, CP, or abd pain. pt wants to go home, Ammonia level is still high    Home Medications:  furosemide 20 mg oral tablet: 2 tab(s) orally once a day  restart at home (2019 04:11)  gabapentin 100 mg oral capsule: 2 cap(s) orally 4 times a day (2019 04:11)  insulin glargine: 50 unit(s) subcutaneous once a day (at bedtime) pt has pen (2019 04:11)  lactulose 10 g/15 mL oral syrup: 15 milliliter(s) orally 4 times a day (2019 04:11)  phytonadione 100 mcg oral tablet: 1 tab(s) orally once a day (2019 04:11)  potassium chloride 10 mEq oral tablet, extended release: 1 tab(s) orally once a day (2019 04:11)  pravastatin 40 mg oral tablet: 1 tab(s) orally once a day (2019 04:11)  rifAXIMin 550 mg oral tablet: 1 tab(s) orally 2 times a day (2019 04:11)  Vitamin D3 2000 intl units oral tablet: 1 tab(s) orally once a day (2019 04:11)      MEDICATIONS  (STANDING):  atorvastatin 10 milliGRAM(s) Oral at bedtime  cholecalciferol 2000 Unit(s) Oral daily  dextrose 5%. 1000 milliLiter(s) (50 mL/Hr) IV Continuous <Continuous>  dextrose 50% Injectable 12.5 Gram(s) IV Push once  dextrose 50% Injectable 25 Gram(s) IV Push once  dextrose 50% Injectable 25 Gram(s) IV Push once  FIRST- Mouthwash  BLM 30 milliLiter(s) Swish and Spit four times a day  furosemide    Tablet 40 milliGRAM(s) Oral daily  gabapentin 200 milliGRAM(s) Oral four times a day  heparin  Injectable 5000 Unit(s) SubCutaneous every 8 hours  insulin glargine Injectable (LANTUS) 50 Unit(s) SubCutaneous at bedtime  insulin lispro (HumaLOG) corrective regimen sliding scale   SubCutaneous three times a day before meals  insulin lispro (HumaLOG) corrective regimen sliding scale   SubCutaneous at bedtime  lactulose Syrup 30 Gram(s) Oral every 6 hours  pantoprazole    Tablet 40 milliGRAM(s) Oral before breakfast  phytonadione   Solution 0.1 milliGRAM(s) Oral daily  potassium chloride    Tablet ER 10 milliEquivalent(s) Oral daily  rifaximin 550 milliGRAM(s) Oral two times a day  sucralfate 1 Gram(s) Oral every 6 hours  ursodiol Capsule 300 milliGRAM(s) Oral three times a day    MEDICATIONS  (PRN):  aluminum hydroxide/magnesium hydroxide/simethicone Suspension 30 milliLiter(s) Oral every 4 hours PRN Dyspepsia  dextrose 40% Gel 15 Gram(s) Oral once PRN Blood Glucose LESS THAN 70 milliGRAM(s)/deciliter  glucagon  Injectable 1 milliGRAM(s) IntraMuscular once PRN Glucose LESS THAN 70 milligrams/deciliter      Allergies    codeine (Anaphylaxis)    Intolerances    REVIEW OF SYSTEMS: i want to go home  CONSTITUTIONAL: No fever, No chills, No fatigue, No myalgia, no lightheadedness  EYES: No eye pain,  no headache, No visual disturbances, No discharge, NO Redness  ENMT:  No ear pain, No nose bleed, No vertigo; No sinus or throat pain, No Congestion  NECK: No pain, No stiffness  RESPIRATORY: No cough, wheezing, No  hemoptysis, No shortness of breath  CARDIOVASCULAR: No chest pain, palpitations  GASTROINTESTINAL: No abdominal or epigastric pain. No nausea, No vomiting; No diarrhea or constipation. [x  ] BM  GENITOURINARY: No dysuria, No frequency, No urgency, No hematuria, or incontinence  NEUROLOGICAL: No headaches, No dizziness, No numbness, No tingling, No tremors, No weakness  EXT: No Swelling, No Pain, No Edema  SKIN:  [ x ] No itching, burning  MUSCULOSKELETAL: No joint pain or swelling; No muscle pain, No back pain, No extremity pain  PAIN SCALE: [ x ] None  [  ] Other-  ROS Unable to obtain due to - [  ] Dementia  [  ] Lethargy  [  ] Sedated [  ] non verbal  REST OF REVIEW Of SYSTEM - [ x ] Normal     Vital Signs Last 24 Hrs  T(C): 36.7 (2019 05:58), Max: 36.8 (2019 00:51)  T(F): 98 (2019 05:58), Max: 98.3 (2019 00:51)  HR: 100 (2019 05:58) (70 - 100)  BP: 140/62 (2019 05:58) (140/62 - 152/83)  BP(mean): --  RR: 16 (2019 05:58) (16 - 18)  SpO2: 99% (2019 05:58) (95% - 99%)  Finger Stick    PHYSICAL EXAM:  GENERAL:  [x  ] NAD , [ x ] well appearing, [  ] Agitated, [  ] Drowsy,  [  ] Lethargy, [  ] confused   HEAD:  [x  ] Normal, [  ] Other  EYES:  [x  ] EOMI, [ x ] PERRLA, [x  ] conjunctiva and sclera clear normal, [  ] Other,  [  ] Pallor,[  ] Discharge  ENMT:  [x  ] Normal, [ x ] Moist mucous membranes, [ x ] Good dentition, [ x ] No Thrush  NECK:  [x  ] Supple, [x  ] No JVD, [ x ] Normal thyroid, [  ] Lymphadenopathy [  ] Other  CHEST/LUNG:  [ x ] Clear to auscultation bilaterally, [ x ] Breath Sounds equal b/l,  [ x ] No rales, [x  ] No rhonchi  [ x ]  No wheezing  HEART:  [x  ] Regular rate and rhythm, [  ] tachycardia, [  ] Bradycardia,  [  ] irregular  [ x ] No murmurs, No rubs, No gallops, [  ] PPM in place (Mfr:  )  ABDOMEN: obese [ x ] Soft, [ x ] Nontender, [  x] Nondistended, [ x ] No mass, [ x ] Bowel sounds present, [ x ] obese  NERVOUS SYSTEM:  [ x ] Alert & Oriented X1, [ x ] Nonfocal  [  ] Confusion  [  x] Encephalopathic [  ] Sedated [  ] Unable to assess, [  ] Other-  EXTREMITIES: [ x ] 2+ Peripheral Pulses, No clubbing, No cyanosis,  [x  ] 2 + pitting edema B/L lower EXT. [  ] PVD stasis skin changes B/L Lower EXT  LYMPH: No lymphadenopathy noted  SKIN:  [ x ] warm and well perfused, [  ] Pressure Ulcers, [  ] ecchymosis, [  ] Skin Tears, [  ] Other    DIET:  Consistent Carb    LABS:                        11.7   4.47  )-----------( 92       ( 2019 07:51 )             34.4     2019 07:51    144    |  112    |  9      ----------------------------<  203    3.9     |  24     |  0.76     Ca    8.4        2019 07:51    TPro  5.8    /  Alb  2.4    /  TBili  2.4    /  DBili  x      /  AST  21     /  ALT  24     /  AlkPhos  309    2019 07:51      Urinalysis Basic - ( 2019 05:54 )    Color: Laurel / Appearance: Clear / S.015 / pH: x  Gluc: x / Ketone: Negative  / Bili: Small / Urobili: 4   Blood: x / Protein: Negative / Nitrite: Negative   Leuk Esterase: Negative / RBC: 25-50 /HPF / WBC Negative   Sq Epi: x / Non Sq Epi: Occasional / Bacteria: Occasional    RADIOLOGY & ADDITIONAL TESTS:NONE    HEALTH ISSUES - PROBLEM Dx:  Need for prophylactic measure: Need for prophylactic measure  Diabetes: Diabetes  Thrombocytopenia: Thrombocytopenia  Hypercholesteremia: Hypercholesteremia  Hypertension: Hypertension  GERD with esophagitis: GERD with esophagitis  PUD (peptic ulcer disease): PUD (peptic ulcer disease)  GIB (gastrointestinal bleeding): GIB (gastrointestinal bleeding)  Fatty liver disease, nonalcoholic: Fatty liver disease, nonalcoholic  Hepatic encephalopathy: Hepatic encephalopathy    Consultant(s) Notes Reviewed:  [ x ] YES     Care Discussed with [X] Consultants  [  ] Patient  [  ] Family  [  ]   [  ] Social Service  [  ] RN, [  ] Physical Therapy  DVT PPX: [  ] Lovenox, [x  ] S C Heparin, [  ] Coumadin, [  ] Xarelto, [  ] Eliquis, [  ] Pradaxa, [  ] IV Heparin drip, [  ] SCD [  ] Contraindication 2 to GI Bleed,[  ] Ambulation  ADVANCE DIRECTIVE: [ x ] NONE, [  ]DNR/DNI

## 2019-04-16 NOTE — H&P ADULT - PROBLEM SELECTOR PLAN 1
-Admit to [facility]  -Pt with elevated ammonia to 156, likely 2/2 JEAN. Hyperammonemia is likely contributing to current AMS  No acute intracranial bleeding, mass effect, or shift. Mild chronic   microvascular ischemic changes.   -S/p lactulose 30g x1 in ED  -cont home lactulose, rifaximin, phytonadione, ursodiol  -GI Dr. Morales group consult  -Trend ammonia levels -Admit to [facility]  -Pt with elevated ammonia to 156, likely 2/2 JEAN. Hyperammonemia is likely contributing to current AMS, in setting of medication noncompliance  No acute intracranial bleeding, mass effect, or shift. Mild chronic   microvascular ischemic changes.   -S/p lactulose 30g x1 in ED  -Cont lactulose 30g q6 hours  -cont home rifaximin, phytonadione, ursodiol  -GI Dr. Morales group consult  -Trend ammonia levels

## 2019-04-16 NOTE — PROGRESS NOTE ADULT - PROBLEM SELECTOR PLAN 2
As per last admission 4/3 no ascites on CT- T bili elevated 2  -with hyperammonemia (117) hypoalbumin (2.4), T bili 2, alk phosp 334  -continue lactulose, ursodiol, rifaximin   -GI Consult Dr Morales  -monitor ammonia  -Plan as above As per last admission 4/3 no ascites on CT- T bili elevated 2  -with hyperammonemia (117) hypoalbuminemia (2.4), T bili 2, alk phosp 334  -continue lactulose, ursodiol, rifaximin   -GI Consult Dr Morales  -monitor ammonia  -Plan as above

## 2019-04-16 NOTE — ED PROVIDER NOTE - CROS ED NEURO POS
Subjective:       Patient ID: Eliza Zamudio is a 6 y.o. female.    Chief Complaint: Otalgia; Hoarse; and Cough    Otalgia    There is pain in the right ear. This is a new problem. There has been no fever. Associated symptoms include coughing and a sore throat. Pertinent negatives include no abdominal pain, diarrhea, ear discharge, headaches, hearing loss, neck pain, rash, rhinorrhea or vomiting. Associated symptoms comments: Ear pain .   Cough   This is a new problem. The current episode started yesterday. The problem has been unchanged. The cough is non-productive. Associated symptoms include ear pain and a sore throat. Pertinent negatives include no chest pain, chills, ear congestion, exercise intolerance, fever, headaches, heartburn, hemoptysis, myalgias, nasal congestion, postnasal drip, rash, rhinorrhea, shortness of breath, sweats, weight loss or wheezing. She has tried nothing for the symptoms.       Health Maintenance Due   Topic Date Due    Hepatitis A Vaccines (1 of 2 - Standard Series) 05/09/2013    Hepatitis B Vaccines (2 of 3 - Primary Series) 07/22/2013       History reviewed. No pertinent past medical history.    Current Outpatient Prescriptions   Medication Sig Dispense Refill    amoxicillin (AMOXIL) 400 mg/5 mL suspension Take 5 mLs (400 mg total) by mouth 2 (two) times daily. 100 mL 0     No current facility-administered medications for this visit.        Review of Systems   Constitutional: Negative for chills, fever and weight loss.   HENT: Positive for ear pain and sore throat. Negative for ear discharge, hearing loss, postnasal drip and rhinorrhea.    Respiratory: Positive for cough. Negative for hemoptysis, shortness of breath and wheezing.    Cardiovascular: Negative for chest pain.   Gastrointestinal: Negative for abdominal pain, diarrhea, heartburn and vomiting.   Musculoskeletal: Negative for myalgias and neck pain.   Skin: Negative for rash.   Neurological: Negative for  "headaches.       Objective:   BP (!) 82/54   Pulse 64   Temp 97.9 °F (36.6 °C) (Tympanic)   Ht 3' 7" (1.092 m)   Wt 15.9 kg (35 lb 0.9 oz)   BMI 13.33 kg/m²      Physical Exam   Constitutional: She appears well-developed and well-nourished. No distress.   HENT:   Head: Normocephalic and atraumatic.   Right Ear: Tympanic membrane is injected, erythematous and bulging. Tympanic membrane is not retracted.   Left Ear: Tympanic membrane normal.   Nose: Nose normal.   Mouth/Throat: Mucous membranes are moist. Dentition is normal. Oropharynx is clear.   Mild pharyngitis and sinus tenderness    Eyes: Conjunctivae and EOM are normal. Pupils are equal, round, and reactive to light.   Neck: Normal range of motion.   Cardiovascular: Normal rate, regular rhythm, S1 normal and S2 normal.    Pulmonary/Chest: Effort normal and breath sounds normal. No stridor.   Abdominal: Soft.   Musculoskeletal: Normal range of motion.   Lymphadenopathy: No occipital adenopathy is present.     She has no cervical adenopathy.   Neurological: She is alert.   Skin: Skin is warm.         Lab Results   Component Value Date    WBC 8.74 06/24/2013    HGB 12.5 06/24/2013    HCT 38.3 06/24/2013     06/24/2013    ALT 12 08/14/2013    AST 25 08/14/2013     (L) 08/14/2013    K 4.0 08/14/2013     08/14/2013    CREATININE 0.4 (L) 08/14/2013    BUN 13 08/14/2013    CO2 20 (L) 08/14/2013    TSH 0.691 06/24/2013    HGBA1C 5.4 08/14/2013       Assessment:       1. Acute suppurative otitis media of right ear without spontaneous rupture of tympanic membrane, recurrence not specified        Plan:   Acute suppurative otitis media of right ear without spontaneous rupture of tympanic membrane, recurrence not specified  -     amoxicillin (AMOXIL) 400 mg/5 mL suspension; Take 5 mLs (400 mg total) by mouth 2 (two) times daily.  Dispense: 100 mL; Refill: 0        No Follow-up on file.  " confusion

## 2019-04-16 NOTE — PATIENT PROFILE ADULT - NSPROGENOTHERPROVIDER_GEN_A_NUR
outpatient care/DR Dillan Coto [phone]  Dr Morales [facility] [phone]  Dr Jose Enrique Grace [facility] [phone]

## 2019-04-16 NOTE — PHYSICAL THERAPY INITIAL EVALUATION ADULT - PERTINENT HX OF CURRENT PROBLEM, REHAB EVAL
As per H&P:"Pt is a 61 y/o M with PMHx of Type 2 DM, HTN, HLD, obesity, nephrolithiasis, neuropathy, HE, NAFLD, Hyper ammonemia, recent Gastritis/ esophagitis who presents with AMS

## 2019-04-16 NOTE — CONSULT NOTE ADULT - PROBLEM SELECTOR RECOMMENDATION 9
still mildly confused and with asteriskis  continue xifaxin   continue lactulose q 6 hours and can start tapering after having BMs  monitor elytes

## 2019-04-16 NOTE — H&P ADULT - ASSESSMENT
62 y/o M with PMHx of Type 2 DM on insulin, HTN, HLD, obesity, nephrolithiasis, neuropathy, Hepatic encephalopathy, NAFLD (diagnosed 2/2018), cirrhosis, Hyperammonemia,  Gastritis/ esophagitis,  UGIB s/p EGD, duodenal ulcer, s/p clips and cauterization, and e. coli bacteremia BIB wife for AMS x1d. At the time of presentation no family members present at bedside and patient unable to provide a history due to AMS. The following history is obtained from the wife (Ada) via phone. She reports earlier in the pm prior to admission pt was very confused, was "trying to walk through walls" and thought "kitchen was the bathroom." Additionally, pt told wife he thought it was 2012. Wife reports patient often gets episodes of confusion that correspond to high ammonia levels due to his NAFLD. Wife called GI Dr. Morales earlier today, did not recieve callback from their office and so arrived straight to [facility]. She reports pt was fine earlier today prior to this event, and the current episode of AMS started with difficulty in word finding.  Of note, patient recently admitted to [facility] 4/3-4/8 for abd pain, pancreatitis, cirrhosis and portal HTN; GI workup found large duodenal ulcer with esophagitis for which he was treated supportively. At present, wife reports pt denies any fevers, chills, n/v/d, hematemesis, brbpr, black stools or any other symptoms.    In the ED vitals, T98.3, HR70, 152/83, 18, 95%RA  Labs significant for no leukocytosis, H/H 11.7/34.2 (baseline), thrombocytopenia (baseline). Lactate 1.9. Glucose 247, albumin 2.4, calcium 8.3, T bili 2, Alk phosp 334, ammonia 156    CXR (prelim) shows RLL infiltrates  CT head-No acute intracranial bleeding, mass effect, or shift. Mild chronic   microvascular ischemic changes.   EKG shows NSR 97 without ST/T changes    Given NS 1L x1, lactulose 30g x1, urine and blood cx sent 64 y/o M with PMHx of Type 2 DM on insulin, HTN, HLD, obesity, nephrolithiasis, neuropathy, Hepatic encephalopathy, NAFLD (diagnosed 2/2018), cirrhosis, Hyperammonemia,  Gastritis/ esophagitis,  UGIB s/p EGD, duodenal ulcer, s/p clips and cauterization, and e. coli bacteremia BIB wife for AMS x1d. Admitted for AMS due to hyperammonemia likely 2/2 to cirrhotic JEAN.

## 2019-04-16 NOTE — ED ADULT NURSE NOTE - PMH
Diabetes    Fatty liver disease, nonalcoholic    GERD with esophagitis  Gastritis & Non Bleeding Ulcers  GIB (gastrointestinal bleeding)    Hepatic encephalopathy    Hypercholesteremia    Hypertension    Neuropathy    Obesity    Renal stones  25 years ago

## 2019-04-16 NOTE — H&P ADULT - PROBLEM SELECTOR PLAN 2
As per last admission 4/3 no ascites on CT- T bili elevated 2  -with hyperammonemia (156), hypoalbumin (2.4), T bili 2, alk phosp 334  -continue home med lactulose, ursodiol, rifaximin   -GI Consult Dr Morales  -monitor ammonia  -Plan as above As per last admission 4/3 no ascites on CT- T bili elevated 2  -with hyperammonemia (156), hypoalbumin (2.4), T bili 2, alk phosp 334  -continue lactulose, ursodiol, rifaximin   -GI Consult Dr Morales  -monitor ammonia  -Plan as above As per last admission 4/3 no ascites on CT- T bili elevated 2  -with hyperammonemia (156), hypoalbuminemia (2.4), T bili 2, alk phosp 334  -continue lactulose, ursodiol, rifaximin   -GI Consult Dr Morales  -monitor ammonia  -Plan as above

## 2019-04-16 NOTE — PROGRESS NOTE ADULT - PROBLEM SELECTOR PLAN 9
-As per discussion with wife, patient possibly noncompliant with medications. Glucose 203  -Start low dose sliding scale with 50 units lantus QHS  -CCarb  -hypoglycemic protocol   -cont home gabapentin 200 mg qid  -A1c 10.5 4/4/19 noted

## 2019-04-16 NOTE — PROGRESS NOTE ADULT - PROBLEM SELECTOR PLAN 3
-Pt with history of GIB s/p clips and cauterization, wife not reporting any bleeding events including BRBPR on this admission  -EGD - large Duodenal ulcer with esophagitis during last admission 2 wks prior. 4/9 biopsy results 1. Duodenum, ulcer edge biopsy:  Peptic duodenitis with reactive cellular changes. gastric surface epithelial metaplasia.  Stomach, antrum, biopsy: Chronic inactive gastritis involving gastric antrum and  body-type mucosa. H. Pylori is negative.  -Protonix, 40 mg  BID  -Carafate 4x day  -Maalox q4 PRN   -cont home Magic Mouth Wash to decrease mucosal pain & incr appetite -Pt with history of GIB s/p clips and cauterization, wife not reporting any bleeding on this admission  -EGD - large Duodenal ulcer with esophagitis during last admission 2 wks prior. 4/9 biopsy results 1. Duodenum, ulcer edge biopsy:  Peptic duodenitis with reactive cellular changes. gastric surface epithelial metaplasia.  Stomach, antrum, biopsy: Chronic inactive gastritis involving gastric antrum and  body-type mucosa. H. Pylori is negative.  -Protonix, 40 mg  BID  -Carafate 4x day  -Maalox q4 PRN   -cont home Magic Mouth Wash to decrease mucosal pain & incr appetite

## 2019-04-16 NOTE — H&P ADULT - PROBLEM SELECTOR PLAN 8
-Chronic likely 2/2 to liver disease as above. PLT on admission 86.   with normocytic anemia H/H 11.7/34.2 (baseline) likely also 2/2 to chronic disease -trend H-H (on admission   -Protonix 40 mg  BID, Carafate   GI Sideridis consult

## 2019-04-16 NOTE — H&P ADULT - RS GEN PE MLT RESP DETAILS PC
normal/clear to auscultation bilaterally/good air movement/breath sounds equal/no rales/no wheezes/no rhonchi

## 2019-04-16 NOTE — H&P ADULT - MENTAL STATUS
A&Ox 0-1, alert only to self, not alert to time or place. Able to follow commands. Forgetful and confused

## 2019-04-16 NOTE — H&P ADULT - PROBLEM SELECTOR PLAN 3
-Pt with history of GIB s/p clips and cauterization, wife not reporting any bleeding events including BRBPR on this admission  -EGD - large Duodenal ulcer with esophagitis during last admission 2 wks prior. 4/9 biopsy results 1. Duodenum, ulcer edge biopsy:  Peptic duodenitis with reactive cellular changes.gastric surface epithelial metaplasia.  Stomach, antrum, biopsy: Chronic inactive gastritis involving gastric antrum and  body-type mucosa. H. Pylori is negative.  -Protonix, 40 mg  BID  -Carafate 4x day  -Maalox q4 PRN   -cont home Magic Mouth Wash to decrease mucosal pain & incr appetite -Pt with history of GIB s/p clips and cauterization, wife not reporting any bleeding events including BRBPR on this admission  -EGD - large Duodenal ulcer with esophagitis during last admission 2 wks prior. 4/9 biopsy results 1. Duodenum, ulcer edge biopsy:  Peptic duodenitis with reactive cellular changes. gastric surface epithelial metaplasia.  Stomach, antrum, biopsy: Chronic inactive gastritis involving gastric antrum and  body-type mucosa. H. Pylori is negative.  -Protonix, 40 mg  BID  -Carafate 4x day  -Maalox q4 PRN   -cont home Magic Mouth Wash to decrease mucosal pain & incr appetite -Pt with history of  FOBT + ,S/P EGD s/p clips and cauterization of Duodenal ulcer , wife not reporting any bleeding events including BRBPR on this admission  -EGD - large Duodenal ulcer with esophagitis during last admission 2 wks prior. 4/9 biopsy results 1. Duodenum, ulcer edge biopsy:  Peptic duodenitis with reactive cellular changes. gastric surface epithelial metaplasia. D/W Dr Morales  Stomach, antrum, biopsy: Chronic inactive gastritis involving gastric antrum and  body-type mucosa. H. Pylori is negative.  -Protonix, 40 mg  BID  -Carafate 4x day  -Maalox q4 PRN   -cont home Magic Mouth Wash to decrease mucosal pain & incr appetite

## 2019-04-16 NOTE — H&P ADULT - PROBLEM SELECTOR PLAN 5
Plan as above  cont ppi, carafate, maalox prn Plan as above; bx EGD results noted;  large Duodenal ulcer with esophagitis during last admission 2 wks prior. 4/9 biopsy results 1. Duodenum, ulcer edge biopsy:  Peptic duodenitis with reactive cellular changes.gastric surface epithelial metaplasia.  Stomach, antrum, biopsy: Chronic inactive gastritis involving gastric antrum and  body-type mucosa. H. Pylori is negative.  cont ppi, carafate, maalox prn Plan as above; bx EGD results noted;  large Duodenal ulcer with esophagitis during last admission 2 wks prior. 4/9 biopsy results 1. Duodenum, ulcer edge biopsy:  Peptic duodenitis with reactive cellular changes.  .gastric surface epithelial metaplasia.  Stomach, antrum, biopsy: Chronic inactive gastritis involving gastric antrum and  body-type mucosa. H. Pylori is negative.  cont ppi, carafate, maalox prn -Chronic likely 2/2 to liver disease as above. PLT on admission 86.   with normocytic anemia H/H 11.7/34.2 (baseline) likely also 2/2 to chronic liver  disease  -Pt is followed by DR Rios H/o

## 2019-04-16 NOTE — PATIENT PROFILE ADULT - NSPROGENSOURCEINFO_GEN_A_NUR
Yes health record/patient/family/Copy Forward H&P from 10/17/2018,info verified with pt & spouse@ bedside

## 2019-04-16 NOTE — ED ADULT NURSE REASSESSMENT NOTE - NS ED NURSE REASSESS COMMENT FT1
pt reavaluated and ct scan done pt with elevated ammonia level and medicated with lactulose as per md order pt admitted to [facility] awaiting  bed assignment
pt admitted to floor  and sleeping at present  still with periods of confusion pt awaiting bed assignment

## 2019-04-16 NOTE — H&P ADULT - NSICDXFAMILYHX_GEN_ALL_CORE_FT
FAMILY HISTORY:  Family history of hypertension  Family history of stomach cancer  Family history of type 2 diabetes mellitus

## 2019-04-16 NOTE — H&P ADULT - PROBLEM SELECTOR PLAN 10
IMPROVE VTE Individual Risk Assessment  RISK                                                                Points    [  ] Previous VTE                                                  3  [  x] Thrombophilia                                               2  [  ] Lower limb paralysis                                      2        (unable to hold up >15 seconds)    [  ] Current Cancer                                              2         (within 6 months)  [  ] Immobilization > 24 hrs                                1  [  ] ICU/CCU stay > 24 hours                              1  [  x] Age > 60                                                      1  IMPROVE VTE Score ____3_____    DVT ppx with lovenox IMPROVE VTE Individual Risk Assessment  RISK                                                                Points    [  ] Previous VTE                                                  3  [  x] Thrombophilia                                               2  [  ] Lower limb paralysis                                      2        (unable to hold up >15 seconds)    [  ] Current Cancer                                              2         (within 6 months)  [  ] Immobilization > 24 hrs                                1  [  ] ICU/CCU stay > 24 hours                              1  [  x] Age > 60                                                      1  IMPROVE VTE Score ____3_____    DVT ppx with lovenox  PT consult IMPROVE VTE Individual Risk Assessment  RISK                                                                Points    [  ] Previous VTE                                                  3  [  x] Thrombophilia                                               2  [  ] Lower limb paralysis                                      2        (unable to hold up >15 seconds)    [  ] Current Cancer                                              2         (within 6 months)  [  ] Immobilization > 24 hrs                                1  [  ] ICU/CCU stay > 24 hours                              1  [  x] Age > 60                                                      1  IMPROVE VTE Score ____3_____    DVT ppx with heparin  PT consult

## 2019-04-16 NOTE — H&P ADULT - PROBLEM SELECTOR PLAN 6
continue home Lasix with hold parameters -As per discussion with wife, patient possibly noncompliant with medications. Glucose 247  -Start low dose sliding scale with 50 units Lantus QHS  -CCarb  -hypoglycemic protocol   -cont home gabapentin 200 mg qid  -A1c 10.5 4/4/19 noted

## 2019-04-16 NOTE — H&P ADULT - PROBLEM SELECTOR PLAN 4
-trend H-H (on admission   -Protonix 40 mg  BID, Carafate   GI Sideridis consult continue home Lasix with hold parameters

## 2019-04-17 ENCOUNTER — TRANSCRIPTION ENCOUNTER (OUTPATIENT)
Age: 64
End: 2019-04-17

## 2019-04-17 VITALS
OXYGEN SATURATION: 96 % | TEMPERATURE: 98 F | HEART RATE: 95 BPM | DIASTOLIC BLOOD PRESSURE: 84 MMHG | SYSTOLIC BLOOD PRESSURE: 143 MMHG | RESPIRATION RATE: 16 BRPM

## 2019-04-17 DIAGNOSIS — K74.60 UNSPECIFIED CIRRHOSIS OF LIVER: ICD-10-CM

## 2019-04-17 LAB
ALBUMIN SERPL ELPH-MCNC: 2.4 G/DL — LOW (ref 3.3–5)
ALP SERPL-CCNC: 201 U/L — HIGH (ref 40–120)
ALT FLD-CCNC: 25 U/L — SIGNIFICANT CHANGE UP (ref 12–78)
AMMONIA BLD-MCNC: 45 UMOL/L — HIGH (ref 11–32)
ANION GAP SERPL CALC-SCNC: 7 MMOL/L — SIGNIFICANT CHANGE UP (ref 5–17)
APTT BLD: 43.7 SEC — HIGH (ref 27.5–36.3)
AST SERPL-CCNC: 32 U/L — SIGNIFICANT CHANGE UP (ref 15–37)
BILIRUB SERPL-MCNC: 3.8 MG/DL — HIGH (ref 0.2–1.2)
BUN SERPL-MCNC: 9 MG/DL — SIGNIFICANT CHANGE UP (ref 7–23)
CALCIUM SERPL-MCNC: 8.1 MG/DL — LOW (ref 8.5–10.1)
CHLORIDE SERPL-SCNC: 111 MMOL/L — HIGH (ref 96–108)
CO2 SERPL-SCNC: 27 MMOL/L — SIGNIFICANT CHANGE UP (ref 22–31)
CREAT SERPL-MCNC: 0.86 MG/DL — SIGNIFICANT CHANGE UP (ref 0.5–1.3)
CULTURE RESULTS: SIGNIFICANT CHANGE UP
GLUCOSE SERPL-MCNC: 83 MG/DL — SIGNIFICANT CHANGE UP (ref 70–99)
HCT VFR BLD CALC: 36.6 % — LOW (ref 39–50)
HGB BLD-MCNC: 12 G/DL — LOW (ref 13–17)
INR BLD: 1.55 RATIO — HIGH (ref 0.88–1.16)
MCHC RBC-ENTMCNC: 32.3 PG — SIGNIFICANT CHANGE UP (ref 27–34)
MCHC RBC-ENTMCNC: 32.8 GM/DL — SIGNIFICANT CHANGE UP (ref 32–36)
MCV RBC AUTO: 98.4 FL — SIGNIFICANT CHANGE UP (ref 80–100)
NRBC # BLD: 0 /100 WBCS — SIGNIFICANT CHANGE UP (ref 0–0)
PLATELET # BLD AUTO: 96 K/UL — LOW (ref 150–400)
POTASSIUM SERPL-MCNC: 3.1 MMOL/L — LOW (ref 3.5–5.3)
POTASSIUM SERPL-SCNC: 3.1 MMOL/L — LOW (ref 3.5–5.3)
PROT SERPL-MCNC: 6.1 G/DL — SIGNIFICANT CHANGE UP (ref 6–8.3)
PROTHROM AB SERPL-ACNC: 17.7 SEC — HIGH (ref 10–12.9)
RBC # BLD: 3.72 M/UL — LOW (ref 4.2–5.8)
RBC # FLD: 16.8 % — HIGH (ref 10.3–14.5)
SODIUM SERPL-SCNC: 145 MMOL/L — SIGNIFICANT CHANGE UP (ref 135–145)
SPECIMEN SOURCE: SIGNIFICANT CHANGE UP
WBC # BLD: 5.36 K/UL — SIGNIFICANT CHANGE UP (ref 3.8–10.5)
WBC # FLD AUTO: 5.36 K/UL — SIGNIFICANT CHANGE UP (ref 3.8–10.5)

## 2019-04-17 PROCEDURE — 71045 X-RAY EXAM CHEST 1 VIEW: CPT

## 2019-04-17 PROCEDURE — 94640 AIRWAY INHALATION TREATMENT: CPT

## 2019-04-17 PROCEDURE — 82962 GLUCOSE BLOOD TEST: CPT

## 2019-04-17 PROCEDURE — 83605 ASSAY OF LACTIC ACID: CPT

## 2019-04-17 PROCEDURE — 85610 PROTHROMBIN TIME: CPT

## 2019-04-17 PROCEDURE — 82140 ASSAY OF AMMONIA: CPT

## 2019-04-17 PROCEDURE — 70450 CT HEAD/BRAIN W/O DYE: CPT

## 2019-04-17 PROCEDURE — 36415 COLL VENOUS BLD VENIPUNCTURE: CPT

## 2019-04-17 PROCEDURE — 81001 URINALYSIS AUTO W/SCOPE: CPT

## 2019-04-17 PROCEDURE — 80053 COMPREHEN METABOLIC PANEL: CPT

## 2019-04-17 PROCEDURE — 99285 EMERGENCY DEPT VISIT HI MDM: CPT | Mod: 25

## 2019-04-17 PROCEDURE — 97161 PT EVAL LOW COMPLEX 20 MIN: CPT

## 2019-04-17 PROCEDURE — 93005 ELECTROCARDIOGRAM TRACING: CPT

## 2019-04-17 PROCEDURE — G0378: CPT

## 2019-04-17 PROCEDURE — 85027 COMPLETE CBC AUTOMATED: CPT

## 2019-04-17 PROCEDURE — 85730 THROMBOPLASTIN TIME PARTIAL: CPT

## 2019-04-17 PROCEDURE — 87040 BLOOD CULTURE FOR BACTERIA: CPT

## 2019-04-17 PROCEDURE — 87086 URINE CULTURE/COLONY COUNT: CPT

## 2019-04-17 RX ORDER — POTASSIUM CHLORIDE 20 MEQ
40 PACKET (EA) ORAL EVERY 4 HOURS
Qty: 0 | Refills: 0 | Status: DISCONTINUED | OUTPATIENT
Start: 2019-04-17 | End: 2019-04-17

## 2019-04-17 RX ADMIN — Medication 1: at 17:09

## 2019-04-17 RX ADMIN — Medication 1 GRAM(S): at 12:07

## 2019-04-17 RX ADMIN — GABAPENTIN 200 MILLIGRAM(S): 400 CAPSULE ORAL at 17:11

## 2019-04-17 RX ADMIN — PANTOPRAZOLE SODIUM 40 MILLIGRAM(S): 20 TABLET, DELAYED RELEASE ORAL at 05:50

## 2019-04-17 RX ADMIN — GABAPENTIN 200 MILLIGRAM(S): 400 CAPSULE ORAL at 12:06

## 2019-04-17 RX ADMIN — URSODIOL 300 MILLIGRAM(S): 250 TABLET, FILM COATED ORAL at 13:18

## 2019-04-17 RX ADMIN — GABAPENTIN 200 MILLIGRAM(S): 400 CAPSULE ORAL at 05:51

## 2019-04-17 RX ADMIN — Medication 1 GRAM(S): at 05:51

## 2019-04-17 RX ADMIN — DIPHENHYDRAMINE HYDROCHLORIDE AND LIDOCAINE HYDROCHLORIDE AND ALUMINUM HYDROXIDE AND MAGNESIUM HYDRO 30 MILLILITER(S): KIT at 05:50

## 2019-04-17 RX ADMIN — Medication 1 GRAM(S): at 17:11

## 2019-04-17 RX ADMIN — URSODIOL 300 MILLIGRAM(S): 250 TABLET, FILM COATED ORAL at 05:50

## 2019-04-17 RX ADMIN — Medication 40 MILLIEQUIVALENT(S): at 12:18

## 2019-04-17 RX ADMIN — DIPHENHYDRAMINE HYDROCHLORIDE AND LIDOCAINE HYDROCHLORIDE AND ALUMINUM HYDROXIDE AND MAGNESIUM HYDRO 30 MILLILITER(S): KIT at 17:09

## 2019-04-17 RX ADMIN — Medication 0.1 MILLIGRAM(S): at 12:07

## 2019-04-17 RX ADMIN — LACTULOSE 45 GRAM(S): 10 SOLUTION ORAL at 17:10

## 2019-04-17 RX ADMIN — DIPHENHYDRAMINE HYDROCHLORIDE AND LIDOCAINE HYDROCHLORIDE AND ALUMINUM HYDROXIDE AND MAGNESIUM HYDRO 30 MILLILITER(S): KIT at 12:07

## 2019-04-17 RX ADMIN — HEPARIN SODIUM 5000 UNIT(S): 5000 INJECTION INTRAVENOUS; SUBCUTANEOUS at 05:51

## 2019-04-17 RX ADMIN — Medication 2000 UNIT(S): at 12:08

## 2019-04-17 RX ADMIN — HEPARIN SODIUM 5000 UNIT(S): 5000 INJECTION INTRAVENOUS; SUBCUTANEOUS at 13:18

## 2019-04-17 RX ADMIN — Medication 40 MILLIGRAM(S): at 05:51

## 2019-04-17 RX ADMIN — Medication 10 MILLIEQUIVALENT(S): at 12:07

## 2019-04-17 RX ADMIN — LACTULOSE 45 GRAM(S): 10 SOLUTION ORAL at 12:06

## 2019-04-17 RX ADMIN — LACTULOSE 45 GRAM(S): 10 SOLUTION ORAL at 05:50

## 2019-04-17 NOTE — DISCHARGE NOTE PROVIDER - NSDCFUADDINST_GEN_ALL_CORE_FT
Follow up with DR Morales in 1-2 weeks,   Please make sure you take your Lactulose & Xifaxan as directed  Please make Appointments for your EUS -at [facility] as advised by DR Morales

## 2019-04-17 NOTE — DISCHARGE NOTE PROVIDER - PROVIDER TOKENS
FREE:[LAST:[ramez],FIRST:[ruddy],PHONE:[(   )    -],FAX:[(   )    -]],PROVIDER:[TOKEN:[[id number]]] PROVIDER:[TOKEN:[[id number]]],FREE:[LAST:[ramez],FIRST:[ruddy],PHONE:[(   )    -],FAX:[(   )    -]],PROVIDER:[TOKEN:[[id number]]]

## 2019-04-17 NOTE — DISCHARGE NOTE PROVIDER - CARE PROVIDER_API CALL
ruddy myrick  Phone: (   )    -  Fax: (   )    -  Follow Up Time:     Javy Morales (DO)  Gastroenterology; Internal Medicine  [address] [place] [address]  Phone: [phone]  Fax: [phone]  Follow Up Time: Javy Morales ()  Gastroenterology; Internal Medicine  [address] [place] [address]  Phone: [phone]  Fax: [phone]  Follow Up Time:     ruddy myrick  Phone: (   )    -  Fax: (   )    -  Follow Up Time:     Hon LOGAN Rios (MD)  Hematology; Internal Medicine; Medical Oncology  [address] [facility], [place] [address]  Phone: [phone]  Fax: [phone]  Follow Up Time:

## 2019-04-17 NOTE — PROGRESS NOTE ADULT - ASSESSMENT
64 y/o M with PMHx of Type 2 DM on insulin, HTN, HLD, obesity, nephrolithiasis, neuropathy, Hepatic encephalopathy, NAFLD (diagnosed 2/2018), cirrhosis, Hyperammonemia,  Gastritis/ esophagitis,  FOBT + s/p EGD, duodenal ulcer, s/p clips and cauterization,  BIB wife for AMS x1d. Admitted for AMS due to hyperammonemia likely 2/2 to cirrhotic JEAN.

## 2019-04-17 NOTE — PROGRESS NOTE ADULT - PROBLEM SELECTOR PLAN 2
As per last admission 4/3 no ascites on CT- T bili elevated 2  -with hyperammonemia (117) hypoalbuminemia (2.4), T bili 2, alk phosp 334  -continue lactulose, ursodiol, rifaximin   -GI Consult Dr Morales  -monitor ammonia  -Plan as above alk phos improving  As per last admission 4/3 no ascites on CT- T bili elevated 2  -with hyperammonemia (117) hypoalbuminemia (2.4), T bili 2, alk phosp 334  -continue lactulose, ursodiol, rifaximin   -GI Consult Dr Morales  -monitor ammonia  -Plan as above

## 2019-04-17 NOTE — PROGRESS NOTE ADULT - PROBLEM SELECTOR PLAN 1
-GMF  -mentation improving   -Ammonia level improving, still Elevated   initial elevation in ammonia likely 2/2 JEAN. Hyperammonemia is likely contributing to current AMS, in setting of medication noncompliance  No acute intracranial bleeding, mass effect, or shift. Mild chronic   microvascular ischemic changes.   -S/p lactulose 30g x1 in ED  -Cont lactulose 30g q6 hours  -cont home rifaximin, phytonadione, ursodiol  -GI Dr. Morales D/W increase Lactulose 45 gm q 6 hrs  -Trend ammonia levels -GMF  -mentation improving   -Ammonia level improving  initial elevation in ammonia likely 2/2 JEAN. Hyperammonemia is likely contributing to current AMS, in setting of medication noncompliance  No acute intracranial bleeding, mass effect, or shift. Mild chronic   microvascular ischemic changes.   -S/p lactulose 30g x1 in ED  -Cont lactulose 30g q6 hours  -cont home rifaximin, phytonadione, ursodiol  -GI Dr. Morales D/W increase Lactulose 45 gm q 6 hrs  -Trend ammonia levels -Stable, now   -mentation improving to Normal  -Ammonia level improving-45 today  initial elevation in ammonia likely 2/2 JEAN. Hyperammonemia is likely contributing to current AMS, in setting of medication noncompliance  No acute intracranial bleeding, mass effect, or shift. Mild chronic   microvascular ischemic changes.   -S/p lactulose 30g x1 in ED  -Cont lactulose 30g q6 hours  -cont home rifaximin, phytonadione, ursodiol  -GI Dr. Morales D/W increase Lactulose 45 gm q 6 hrs  -Trend ammonia levels-stable - improving

## 2019-04-17 NOTE — PROGRESS NOTE ADULT - SUBJECTIVE AND OBJECTIVE BOX
INTERVAL HPI/OVERNIGHT EVENTS:  pt seen and examined  denies n/v/abd pain  per rn taking lactulose and having bms  labs pending    MEDICATIONS  (STANDING):  atorvastatin 10 milliGRAM(s) Oral at bedtime  cholecalciferol 2000 Unit(s) Oral daily  dextrose 5%. 1000 milliLiter(s) (50 mL/Hr) IV Continuous <Continuous>  dextrose 50% Injectable 12.5 Gram(s) IV Push once  dextrose 50% Injectable 25 Gram(s) IV Push once  dextrose 50% Injectable 25 Gram(s) IV Push once  FIRST- Mouthwash  BLM 30 milliLiter(s) Swish and Spit four times a day  furosemide    Tablet 40 milliGRAM(s) Oral daily  gabapentin 200 milliGRAM(s) Oral four times a day  heparin  Injectable 5000 Unit(s) SubCutaneous every 8 hours  insulin glargine Injectable (LANTUS) 50 Unit(s) SubCutaneous at bedtime  insulin lispro (HumaLOG) corrective regimen sliding scale   SubCutaneous three times a day before meals  insulin lispro (HumaLOG) corrective regimen sliding scale   SubCutaneous at bedtime  lactulose Syrup 45 Gram(s) Oral every 6 hours  pantoprazole    Tablet 40 milliGRAM(s) Oral before breakfast  phytonadione   Solution 0.1 milliGRAM(s) Oral daily  potassium chloride    Tablet ER 10 milliEquivalent(s) Oral daily  rifaximin 550 milliGRAM(s) Oral two times a day  sucralfate 1 Gram(s) Oral every 6 hours  ursodiol Capsule 300 milliGRAM(s) Oral three times a day    MEDICATIONS  (PRN):  aluminum hydroxide/magnesium hydroxide/simethicone Suspension 30 milliLiter(s) Oral every 4 hours PRN Dyspepsia  dextrose 40% Gel 15 Gram(s) Oral once PRN Blood Glucose LESS THAN 70 milliGRAM(s)/deciliter  glucagon  Injectable 1 milliGRAM(s) IntraMuscular once PRN Glucose LESS THAN 70 milligrams/deciliter      Allergies    codeine (Anaphylaxis)    Intolerances        Review of Systems:    General:  No wt loss, fevers, chills, night sweats, fatigue   Eyes:  Good vision, no reported pain  ENT:  No sore throat, pain, runny nose, dysphagia  CV:  No pain, palpitations, hypo/hypertension  Resp:  No dyspnea, cough, tachypnea, wheezing  GI:  No pain, No nausea, No vomiting, No diarrhea, No constipation, No weight loss, No fever, No pruritis, No rectal bleeding, No melena, No dysphagia  :  No pain, bleeding, incontinence, nocturia  Muscle:  No pain, weakness  Neuro:  No weakness, tingling, memory problems  Psych:  No fatigue, insomnia, mood problems, depression  Endocrine:  No polyuria, polydypsia, cold/heat intolerance  Heme:  No petechiae, ecchymosis, easy bruisability  Skin:  No rash, tattoos, scars, edema      Vital Signs Last 24 Hrs  T(C): 36.5 (2019 05:17), Max: 37.2 (2019 13:13)  T(F): 97.7 (2019 05:17), Max: 98.9 (2019 13:13)  HR: 95 (2019 05:17) (95 - 106)  BP: 129/79 (2019 05:17) (126/76 - 152/77)  BP(mean): --  RR: 16 (2019 05:17) (16 - 18)  SpO2: 98% (2019 05:17) (97% - 99%)    PHYSICAL EXAM:    Constitutional: NAD  HEENT: ncat  Neck: No LAD  Respiratory: dec bs  Cardiovascular: S1 and S2, RRR  Gastrointestinal: obese soft nt mild dt  Extremities: No peripheral edema  Vascular: 2+ peripheral pulses  Neurological: A/O to person/place/time  Skin: No rashes      LABS:                        11.7   4.47  )-----------( 92       ( 2019 07:51 )             34.4         144  |  112<H>  |  9   ----------------------------<  203<H>  3.9   |  24  |  0.76    Ca    8.4<L>      2019 07:51    TPro  5.8<L>  /  Alb  2.4<L>  /  TBili  2.4<H>  /  DBili  x   /  AST  21  /  ALT  24  /  AlkPhos  309<H>  04      Urinalysis Basic - ( 2019 05:54 )    Color: Laurel / Appearance: Clear / S.015 / pH: x  Gluc: x / Ketone: Negative  / Bili: Small / Urobili: 4   Blood: x / Protein: Negative / Nitrite: Negative   Leuk Esterase: Negative / RBC: 25-50 /HPF / WBC Negative   Sq Epi: x / Non Sq Epi: Occasional / Bacteria: Occasional        RADIOLOGY & ADDITIONAL TESTS:

## 2019-04-17 NOTE — PROGRESS NOTE ADULT - PROBLEM SELECTOR PLAN 1
suspect in setting of med non compliance  improving  f/u am labs  f/u bld cxs  continue Xifaxan   continue lactulose, titrate for 4 softs bms per day  trend ammonia levels  monitor elytes  low protein diet

## 2019-04-17 NOTE — PROGRESS NOTE ADULT - ATTENDING COMMENTS
Advanced care planning was discussed with patient and family.  Advanced care planning forms were reviewed and discussed.  Risks, benefits and alternatives of gastroenterologic procedures were discussed in detail and all questions were answered.    30 minutes spent.
Pt seen, Examined, case & care plan d/w pt, residents , DR menjivar at detail.  D/W Pt at detail.  AM labs
Pt seen, examined, case & care plan d/w pt, residents at detail.  D/W GI at detail. Stable for d/c home with Lactulose 30 gm q 6 hrs , D/W Pt & Son at bed side, Xifaxan 2x day. pt agrees  Follow up with GI Dr Morales as out pt & to  Xifaxan sample from office  D/C Home Total D/c care time is 40 minutes.

## 2019-04-17 NOTE — PROGRESS NOTE ADULT - SUBJECTIVE AND OBJECTIVE BOX
Patient is a 63y old  Male who presents with a chief complaint of AMS (2019 08:34)      INTERVAL HPI: 62 y/o M with PMHx of Type 2 DM on insulin, HTN, HLD, obesity, nephrolithiasis, neuropathy, Hepatic encephalopathy, NAFLD (diagnosed 2018), cirrhosis, Hyperammonemia,  Gastritis/ esophagitis,  FOBT+ s/p EGD, duodenal ulcer, s/p clips and cauterization, and e. coli bacteremia BIB wife for AMS x1d. At the time of presentation no family members present at bedside and patient unable to provide a history due to AMS. The following history is obtained from the wife (Ada) via phone. She reports earlier in the pm prior to admission pt was very confused, was "trying to walk through walls" and thought "kitchen was the bathroom." Additionally, pt told wife he thought it was . Wife reports patient often gets episodes of confusion that correspond to high ammonia levels due to his NAFLD. She reports he has not been very compliant with medications. Wife called GI Dr. Morales earlier today, did not recieve callback from their office and so arrived straight to [facility]. She reports pt was fine earlier today prior to this event, and the current episode of AMS started with difficulty in word finding.  Of note, patient recently admitted to [facility] 4/3- for abd pain, pancreatitis, cirrhosis and portal HTN; GI workup found large duodenal ulcer with esophagitis for which he was treated supportively. At present, wife reports pt denies any fevers, chills, n/v/d, hematemesis, brbpr, black stools or any other symptoms.    In the ED vitals, T98.3, HR70, 152/83, 18, 95%RA  Labs significant for no leukocytosis, H/H 11.7/34.2 (baseline), thrombocytopenia (baseline). Lactate 1.9. Glucose 247, albumin 2.4, calcium 8.3, T bili 2, Alk phosp 334, ammonia 156    CXR (prelim) shows RLL infiltrates  CT head-No acute intracranial bleeding, mass effect, or shift. Mild chronic   microvascular ischemic changes.   EKG shows NSR 97 without ST/T changes    Given NS 1L x1, lactulose 30g x1, urine and blood cx sent    19: pt seen and examined at bedside, no acute distress. AAOx1 to only his name. When asked how his week at home was, he mentioned that it was not good due to the people. He was unable to elaborate more about his home situation. Patient denies headache, SOB, CP, or abd pain. pt wants to go home, Ammonia level is still high  19: pt seen and examined at bedside, no acute events overnight, in no acute distress this morning. Patient now AAOx3 with some mild asterixis. Patient denies headaches, SOB, CP, or abd pain. Patient had a BM this morning.     Home Medications:  furosemide 20 mg oral tablet: 2 tab(s) orally once a day  restart at home (2019 04:11)  gabapentin 100 mg oral capsule: 2 cap(s) orally 4 times a day (2019 04:11)  insulin glargine: 50 unit(s) subcutaneous once a day (at bedtime) pt has pen (2019 04:11)  lactulose 10 g/15 mL oral syrup: 15 milliliter(s) orally 4 times a day (2019 04:11)  phytonadione 100 mcg oral tablet: 1 tab(s) orally once a day (2019 04:11)  potassium chloride 10 mEq oral tablet, extended release: 1 tab(s) orally once a day (2019 04:11)  pravastatin 40 mg oral tablet: 1 tab(s) orally once a day (2019 04:11)  rifAXIMin 550 mg oral tablet: 1 tab(s) orally 2 times a day (2019 04:11)  Vitamin D3 2000 intl units oral tablet: 1 tab(s) orally once a day (2019 04:11)      MEDICATIONS  (STANDING):  atorvastatin 10 milliGRAM(s) Oral at bedtime  cholecalciferol 2000 Unit(s) Oral daily  dextrose 5%. 1000 milliLiter(s) (50 mL/Hr) IV Continuous <Continuous>  dextrose 50% Injectable 12.5 Gram(s) IV Push once  dextrose 50% Injectable 25 Gram(s) IV Push once  dextrose 50% Injectable 25 Gram(s) IV Push once  FIRST- Mouthwash  BLM 30 milliLiter(s) Swish and Spit four times a day  furosemide    Tablet 40 milliGRAM(s) Oral daily  gabapentin 200 milliGRAM(s) Oral four times a day  heparin  Injectable 5000 Unit(s) SubCutaneous every 8 hours  insulin glargine Injectable (LANTUS) 50 Unit(s) SubCutaneous at bedtime  insulin lispro (HumaLOG) corrective regimen sliding scale   SubCutaneous three times a day before meals  insulin lispro (HumaLOG) corrective regimen sliding scale   SubCutaneous at bedtime  lactulose Syrup 45 Gram(s) Oral every 6 hours  pantoprazole    Tablet 40 milliGRAM(s) Oral before breakfast  phytonadione   Solution 0.1 milliGRAM(s) Oral daily  potassium chloride    Tablet ER 10 milliEquivalent(s) Oral daily  rifaximin 550 milliGRAM(s) Oral two times a day  sucralfate 1 Gram(s) Oral every 6 hours  ursodiol Capsule 300 milliGRAM(s) Oral three times a day    MEDICATIONS  (PRN):  aluminum hydroxide/magnesium hydroxide/simethicone Suspension 30 milliLiter(s) Oral every 4 hours PRN Dyspepsia  dextrose 40% Gel 15 Gram(s) Oral once PRN Blood Glucose LESS THAN 70 milliGRAM(s)/deciliter  glucagon  Injectable 1 milliGRAM(s) IntraMuscular once PRN Glucose LESS THAN 70 milligrams/deciliter      Allergies    codeine (Anaphylaxis)    Intolerances    REVIEW OF SYSTEMS:  CONSTITUTIONAL: No fever, No chills, No fatigue, No myalgia, No Body ache, No Weakness  EYES: No eye pain,  no headaches, No visual disturbances, No discharge, NO Redness  ENMT:  No ear pain, No nose bleed, No vertigo; No sinus or throat pain, No Congestion  NECK: No pain, No stiffness  RESPIRATORY: No cough, wheezing, No  hemoptysis, No shortness of breath  CARDIOVASCULAR: No chest pain, palpitations  GASTROINTESTINAL: No abdominal or epigastric pain. No nausea, No vomiting; No diarrhea or constipation. [  x] BM  GENITOURINARY: No dysuria, No frequency, No urgency, No hematuria  NEUROLOGICAL: No headaches, No dizziness, No numbness, No tingling, + asterixis   EXT: No Swelling, No Pain, No Edema  SKIN:  [ x ] No itching, burning  MUSCULOSKELETAL: No joint pain or swelling; No muscle pain, No back pain, No extremity pain  PAIN SCALE: [ x ] None  [  ] Other-  ROS Unable to obtain due to - [  ] Dementia  [  ] Lethargy  [  ] Sedated [  ] non verbal  REST OF REVIEW Of SYSTEM - [ x ] Normal     Vital Signs Last 24 Hrs  T(C): 36.5 (2019 05:17), Max: 37.2 (2019 13:13)  T(F): 97.7 (2019 05:17), Max: 98.9 (2019 13:13)  HR: 95 (2019 05:17) (95 - 106)  BP: 129/79 (2019 05:17) (126/76 - 152/77)  BP(mean): --  RR: 16 (2019 05:17) (16 - 18)  SpO2: 98% (2019 05:17) (97% - 99%)  Finger Stick          PHYSICAL EXAM:  GENERAL:  [x  ] NAD , [ x ] well appearing, [  ] Agitated, [  ] Drowsy,  [  ] Lethargy, [  ] confused   HEAD:  [ x ] Normal, [  ] Other  EYES:  [ x ] EOMI, [ x ] PERRLA, [ x ] conjunctiva and sclera clear normal, [  ] Other,  [  ] Pallor,[  ] Discharge  ENMT:  [ x ] Normal, [x  ] Moist mucous membranes, [  ] Good dentition, [  ] No Thrush  NECK:  [ x ] Supple, [x  ] No JVD, [x  ] Normal thyroid, [  ] Lymphadenopathy [  ] Other  CHEST/LUNG:  [ x ] Clear to auscultation bilaterally, [ x ] Breath Sounds equal b/l,  [ x ] No rales, [ x ] No rhonchi  [  x]  No wheezing  HEART:  [x  ] Regular rate and rhythm, [  ] tachycardia, [  ] Bradycardia,  [  ] irregular  [x  ] No murmurs, No rubs, No gallops, [  ] PPM in place (Mfr:  )  ABDOMEN:  obese, [x  ] Soft, [ x ] Nontender, [ x ] Nondistended, [  ] No mass, [ x ] Bowel sounds present, [  ] obese  NERVOUS SYSTEM:  [ x ] Alert & Oriented X3, [  x] Nonfocal  [  ] Confusion  [  ] Encephalopathic [  ] Sedated [  ] Unable to assess, [  ] Other-  EXTREMITIES: [ x ] 2+ Peripheral Pulses, No clubbing, No cyanosis,  [  ] edema B/L lower EXT. [  ] PVD stasis skin changes B/L Lower EXT  LYMPH: No lymphadenopathy noted  SKIN:  [ x ] warm and well perfused, [  ] Pressure Ulcers, [  ] ecchymosis, [  ] Skin Tears, [  ] Other    DIET: consistent carbs     LABS:                        12.0   5.36  )-----------( 96       ( 2019 08:46 )             36.6       Ca    8.4        2019 07:51    Urinalysis Basic - ( 2019 05:54 )    Color: Laurel / Appearance: Clear / S.015 / pH: x  Gluc: x / Ketone: Negative  / Bili: Small / Urobili: 4   Blood: x / Protein: Negative / Nitrite: Negative   Leuk Esterase: Negative / RBC: 25-50 /HPF / WBC Negative   Sq Epi: x / Non Sq Epi: Occasional / Bacteria: Occasional    RADIOLOGY & ADDITIONAL TESTS:      HEALTH ISSUES - PROBLEM Dx:  Cirrhosis: Cirrhosis  Need for prophylactic measure: Need for prophylactic measure  Diabetes: Diabetes  Thrombocytopenia: Thrombocytopenia  Hypercholesteremia: Hypercholesteremia  Hypertension: Hypertension  GERD with esophagitis: GERD with esophagitis  PUD (peptic ulcer disease): PUD (peptic ulcer disease)  GIB (gastrointestinal bleeding): GIB (gastrointestinal bleeding)  Fatty liver disease, nonalcoholic: Fatty liver disease, nonalcoholic  Hepatic encephalopathy: Hepatic encephalopathy          Consultant(s) Notes Reviewed:  [x  ] YES     Care Discussed with [X] Consultants  [  ] Patient  [  ] Family  [  ]   [  ] Social Service  [  ] RN, [  ] Physical Therapy  DVT PPX: [  ] Lovenox, [x  ] S C Heparin, [  ] Coumadin, [  ] Xarelto, [  ] Eliquis, [  ] Pradaxa, [  ] IV Heparin drip, [  ] SCD [  ] Contraindication 2 to GI Bleed,[  ] Ambulation  Advanced directive: [  ] None, [  ] DNR/DNI Patient is a 63y old  Male who presents with a chief complaint of AMS (2019 08:34)      INTERVAL HPI: 64 y/o M with PMHx of Type 2 DM on insulin, HTN, HLD, obesity, nephrolithiasis, neuropathy, Hepatic encephalopathy, NAFLD (diagnosed 2018), cirrhosis, Hyperammonemia,  Gastritis/ esophagitis,  FOBT+ s/p EGD, duodenal ulcer, s/p clips and cauterization, and e. coli bacteremia BIB wife for AMS x1d. At the time of presentation no family members present at bedside and patient unable to provide a history due to AMS. The following history is obtained from the wife (Ada) via phone. She reports earlier in the pm prior to admission pt was very confused, was "trying to walk through walls" and thought "kitchen was the bathroom." Additionally, pt told wife he thought it was . Wife reports patient often gets episodes of confusion that correspond to high ammonia levels due to his NAFLD. She reports he has not been very compliant with medications. Wife called GI Dr. Morales earlier today, did not recieve callback from their office and so arrived straight to [facility]. She reports pt was fine earlier today prior to this event, and the current episode of AMS started with difficulty in word finding.  Of note, patient recently admitted to [facility] 4/3- for abd pain, pancreatitis, cirrhosis and portal HTN; GI workup found large duodenal ulcer with esophagitis for which he was treated supportively. At present, wife reports pt denies any fevers, chills, n/v/d, hematemesis, brbpr, black stools or any other symptoms.    In the ED vitals, T98.3, HR70, 152/83, 18, 95%RA  Labs significant for no leukocytosis, H/H 11.7/34.2 (baseline), thrombocytopenia (baseline). Lactate 1.9. Glucose 247, albumin 2.4, calcium 8.3, T bili 2, Alk phosp 334, ammonia 156    CXR (prelim) shows RLL infiltrates  CT head-No acute intracranial bleeding, mass effect, or shift. Mild chronic   microvascular ischemic changes.   EKG shows NSR 97 without ST/T changes    Given NS 1L x1, lactulose 30g x1, urine and blood cx sent    19: pt seen and examined at bedside, no acute distress. AAOx1 to only his name. When asked how his week at home was, he mentioned that it was not good due to the people. He was unable to elaborate more about his home situation. Patient denies headache, SOB, CP, or abd pain. pt wants to go home, Ammonia level is still high  19: pt seen and examined at bedside, no acute events overnight, in no acute distress this morning. Patient now AAOx3 with some mild asterixis. Patient denies headaches, SOB, CP, or abd pain. Patient had a BM this morning, stable for d/C from GI as Ammonia  level is 45. as per Pt he was NOT taking Lactulose at home for past few days.    Home Medications:  furosemide 20 mg oral tablet: 2 tab(s) orally once a day  restart at home (2019 04:11)  gabapentin 100 mg oral capsule: 2 cap(s) orally 4 times a day (2019 04:11)  insulin glargine: 50 unit(s) subcutaneous once a day (at bedtime) pt has pen (2019 04:11)  lactulose 10 g/15 mL oral syrup: 15 milliliter(s) orally 4 times a day (2019 04:11)  phytonadione 100 mcg oral tablet: 1 tab(s) orally once a day (2019 04:11)  potassium chloride 10 mEq oral tablet, extended release: 1 tab(s) orally once a day (2019 04:11)  pravastatin 40 mg oral tablet: 1 tab(s) orally once a day (2019 04:11)  rifAXIMin 550 mg oral tablet: 1 tab(s) orally 2 times a day (2019 04:11)  Vitamin D3 2000 intl units oral tablet: 1 tab(s) orally once a day (2019 04:11)      MEDICATIONS  (STANDING):  atorvastatin 10 milliGRAM(s) Oral at bedtime  cholecalciferol 2000 Unit(s) Oral daily  dextrose 5%. 1000 milliLiter(s) (50 mL/Hr) IV Continuous <Continuous>  dextrose 50% Injectable 12.5 Gram(s) IV Push once  dextrose 50% Injectable 25 Gram(s) IV Push once  dextrose 50% Injectable 25 Gram(s) IV Push once  FIRST- Mouthwash  BLM 30 milliLiter(s) Swish and Spit four times a day  furosemide    Tablet 40 milliGRAM(s) Oral daily  gabapentin 200 milliGRAM(s) Oral four times a day  heparin  Injectable 5000 Unit(s) SubCutaneous every 8 hours  insulin glargine Injectable (LANTUS) 50 Unit(s) SubCutaneous at bedtime  insulin lispro (HumaLOG) corrective regimen sliding scale   SubCutaneous three times a day before meals  insulin lispro (HumaLOG) corrective regimen sliding scale   SubCutaneous at bedtime  lactulose Syrup 45 Gram(s) Oral every 6 hours  pantoprazole    Tablet 40 milliGRAM(s) Oral before breakfast  phytonadione   Solution 0.1 milliGRAM(s) Oral daily  potassium chloride    Tablet ER 10 milliEquivalent(s) Oral daily  rifaximin 550 milliGRAM(s) Oral two times a day  sucralfate 1 Gram(s) Oral every 6 hours  ursodiol Capsule 300 milliGRAM(s) Oral three times a day    MEDICATIONS  (PRN):  aluminum hydroxide/magnesium hydroxide/simethicone Suspension 30 milliLiter(s) Oral every 4 hours PRN Dyspepsia  dextrose 40% Gel 15 Gram(s) Oral once PRN Blood Glucose LESS THAN 70 milliGRAM(s)/deciliter  glucagon  Injectable 1 milliGRAM(s) IntraMuscular once PRN Glucose LESS THAN 70 milligrams/deciliter      Allergies    codeine (Anaphylaxis)    Intolerances    REVIEW OF SYSTEMS:  CONSTITUTIONAL: No fever, No chills, No fatigue, No myalgia, No Body ache, No Weakness  EYES: No eye pain,  no headaches, No visual disturbances, No discharge, NO Redness  ENMT:  No ear pain, No nose bleed, No vertigo; No sinus or throat pain, No Congestion  NECK: No pain, No stiffness  RESPIRATORY: No cough, wheezing, No  hemoptysis, No shortness of breath  CARDIOVASCULAR: No chest pain, palpitations  GASTROINTESTINAL: No abdominal or epigastric pain. No nausea, No vomiting; No diarrhea or constipation. [  x] BM  GENITOURINARY: No dysuria, No frequency, No urgency, No hematuria  NEUROLOGICAL: No headaches, No dizziness, No numbness, No tingling, + asterixis   EXT: No Swelling, No Pain, No Edema  SKIN:  [ x ] No itching, burning  MUSCULOSKELETAL: No joint pain or swelling; No muscle pain, No back pain, No extremity pain  PAIN SCALE: [ x ] None  [  ] Other-  ROS Unable to obtain due to - [  ] Dementia  [  ] Lethargy  [  ] Sedated [  ] non verbal  REST OF REVIEW Of SYSTEM - [ x ] Normal     Vital Signs Last 24 Hrs  T(C): 36.5 (2019 05:17), Max: 37.2 (2019 13:13)  T(F): 97.7 (2019 05:17), Max: 98.9 (2019 13:13)  HR: 95 (2019 05:17) (95 - 106)  BP: 129/79 (2019 05:17) (126/76 - 152/77)  BP(mean): --  RR: 16 (2019 05:17) (16 - 18)  SpO2: 98% (2019 05:17) (97% - 99%)  Finger Stick      PHYSICAL EXAM: i feel fine, No Complaints  GENERAL:  [x  ] NAD , [ x ] well appearing, [  ] Agitated, [  ] Drowsy,  [  ] Lethargy, [  ] confused   HEAD:  [ x ] Normal, [  ] Other  EYES:  [ x ] EOMI, [ x ] PERRLA, [ x ] conjunctiva and sclera clear normal, [  ] Other,  [  ] Pallor,[  ] Discharge  ENMT:  [ x ] Normal, [x  ] Moist mucous membranes, [  ] Good dentition, [  ] No Thrush  NECK:  [ x ] Supple, [x  ] No JVD, [x  ] Normal thyroid, [  ] Lymphadenopathy [  ] Other  CHEST/LUNG:  [ x ] Clear to auscultation bilaterally, [ x ] Breath Sounds equal b/l,  [ x ] No rales, [ x ] No rhonchi  [  x]  No wheezing  HEART:  [x  ] Regular rate and rhythm, [  ] tachycardia, [  ] Bradycardia,  [  ] irregular  [x  ] No murmurs, No rubs, No gallops, [  ] PPM in place (Mfr:  )  ABDOMEN:  obese, [x  ] Soft, [ x ] Nontender, [ x ] Nondistended, [  ] No mass, [ x ] Bowel sounds present, [  ] obese  NERVOUS SYSTEM:  [ x ] Alert & Oriented X3, [  x] Nonfocal  [  ] Confusion  [  ] Encephalopathic [  ] Sedated [  ] Unable to assess, [  ] Other-  EXTREMITIES: [ x ] 2+ Peripheral Pulses, No clubbing, No cyanosis,  [  ] edema B/L lower EXT. [  ] PVD stasis skin changes B/L Lower EXT  LYMPH: No lymphadenopathy noted  SKIN:  [ x ] warm and well perfused, [  ] Pressure Ulcers, [  ] ecchymosis, [  ] Skin Tears, [  ] Other    DIET: consistent carbs     LABS:                        12.0   5.36  )-----------( 96       ( 2019 08:46 )             36.6     Ammonia, Serum (19 @ 08:46)    Ammonia, Serum: 45 umol/L        Ca    8.4        2019 07:51    Urinalysis Basic - ( 2019 05:54 )    Color: Laurel / Appearance: Clear / S.015 / pH: x  Gluc: x / Ketone: Negative  / Bili: Small / Urobili: 4   Blood: x / Protein: Negative / Nitrite: Negative   Leuk Esterase: Negative / RBC: 25-50 /HPF / WBC Negative   Sq Epi: x / Non Sq Epi: Occasional / Bacteria: Occasional    RADIOLOGY & ADDITIONAL TESTS:      HEALTH ISSUES - PROBLEM Dx:  Cirrhosis: Cirrhosis  Need for prophylactic measure: Need for prophylactic measure  Diabetes: Diabetes  Thrombocytopenia: Thrombocytopenia  Hypercholesteremia: Hypercholesteremia  Hypertension: Hypertension  GERD with esophagitis: GERD with esophagitis  PUD (peptic ulcer disease): PUD (peptic ulcer disease)  GIB (gastrointestinal bleeding): GIB (gastrointestinal bleeding)  Fatty liver disease, nonalcoholic: Fatty liver disease, nonalcoholic  Hepatic encephalopathy: Hepatic encephalopathy        Consultant(s) Notes Reviewed:  [x  ] YES     Care Discussed with [X] Consultants  [ x ] Patient  [  ] Family  [  ]   [  ] Social Service  [ x ] RN, [  ] Physical Therapy  DVT PPX: [  ] Lovenox, [x  ] S C Heparin, [  ] Coumadin, [  ] Xarelto, [  ] Eliquis, [  ] Pradaxa, [  ] IV Heparin drip, [  ] SCD [  ] Contraindication 2 to GI Bleed,[  ] Ambulation  Advanced directive: [ x ] None, [  ] DNR/DNI

## 2019-04-17 NOTE — DISCHARGE NOTE PROVIDER - NSDCCPCAREPLAN_GEN_ALL_CORE_FT
PRINCIPAL DISCHARGE DIAGNOSIS  Diagnosis: Hepatic encephalopathy  Assessment and Plan of Treatment: -You were admitted with altered mental status caused by high ammonia levels in your blood. You received Lactulose while you were here and your ammonia levels decreased and your mental status improved.  - Continue to take lactulose 30g every 6 hours.  - Continue to take ursodiol 300 mg three times a day  - Continue phytonadione 0.1 mg daily  - Continue rifaximin 550 mg twice a day  - You may  rifaximin prescriptions at Dr. Morales' office. Please make an appointment to follow up with him within 3 days of discharge from the hospital.      SECONDARY DISCHARGE DIAGNOSES  Diagnosis: Diabetes mellitus  Assessment and Plan of Treatment: - please continue Lantus 50 units at bedtime  - Please follow up with your PCP within 1 week of discharge from hospital    Diagnosis: HLD (hyperlipidemia)  Assessment and Plan of Treatment: - continue with your atorvastatin 10 mg daily.   - Please follow up with your PCP within 1 week for a repeat CMP to check your liver function    Diagnosis: GERD with esophagitis  Assessment and Plan of Treatment: - You were found to have a large duodenal ulcer with esophagitis during your last admission to [facility]  - Please continue to take protonix 40 mg daily  - Please continue to take carafate 1 g suspension daily  - Please continue to take [id number] mL every 4 hours as needed for dyspepsia  - Please continue to use First mouthwash    Diagnosis: Peptic ulcer disease  Assessment and Plan of Treatment: - Please continue protonix, carafate and maalox as described above    Diagnosis: GIB (gastrointestinal bleeding)  Assessment and Plan of Treatment: - You had a bleeding duodenal ulcer, found on your last admission.  - Please continue protonix, carafate and maalox.  - please monitor your stool for bright red blood and/or black or tarry stool.  - please follow up with Dr. Morales within 3 days of discharge from hospital.    Diagnosis: Cirrhosis  Assessment and Plan of Treatment: - due to nonalcoholic fatty liver disease  - continue lactulose 30 g q6  - continue rifaximin  - continue ursodiol  - follow up with Dr. Morales PRINCIPAL DISCHARGE DIAGNOSIS  Diagnosis: Hepatic encephalopathy  Assessment and Plan of Treatment: -You were admitted with altered mental status caused by high ammonia levels in your blood. You received Lactulose while you were here and your ammonia levels decreased and your mental status improved.  - Continue to take lactulose 30g every 6 hours.  - Continue to take ursodiol 300 mg three times a day  - Continue phytonadione 0.1 mg daily  - Continue rifaximin 550 mg twice a day  - You may  rifaximin prescriptions at Dr. Morales' office. Please make an appointment to follow up with him within 3 days of discharge from the hospital.      SECONDARY DISCHARGE DIAGNOSES  Diagnosis: Diabetes mellitus  Assessment and Plan of Treatment: - please continue Lantus 50 units at bedtime  - Please follow up with your PCP within 1 week of discharge from hospital    Diagnosis: HLD (hyperlipidemia)  Assessment and Plan of Treatment: - continue with your pravastatin 40 mg daily.   - Please follow up with your PCP within 1 week for a repeat CMP to check your liver function    Diagnosis: GERD with esophagitis  Assessment and Plan of Treatment: - You were found to have a large duodenal ulcer with esophagitis during your last admission to [facility]  - Please continue to take protonix 40 mg daily  - Please continue to take carafate 1 g suspension daily  - Please continue to take [id number] mL every 4 hours as needed for dyspepsia  - Please continue to use First mouthwash    Diagnosis: Peptic ulcer disease  Assessment and Plan of Treatment: - Please continue protonix, carafate and maalox as described above    Diagnosis: GIB (gastrointestinal bleeding)  Assessment and Plan of Treatment: - You had a bleeding duodenal ulcer, found on your last admission.  - Please continue protonix, carafate and maalox.  - please monitor your stool for bright red blood and/or black or tarry stool.  - please follow up with Dr. Morales within 3 days of discharge from hospital.    Diagnosis: Cirrhosis  Assessment and Plan of Treatment: - due to nonalcoholic fatty liver disease  - continue lactulose 30 g q6  - continue rifaximin  - continue ursodiol  - follow up with Dr. Morales PRINCIPAL DISCHARGE DIAGNOSIS  Diagnosis: Hepatic encephalopathy  Assessment and Plan of Treatment: -You were admitted with altered mental status caused by high ammonia levels in your blood. You received Lactulose while you were here and your ammonia levels decreased and your mental status improved.  - Continue to take lactulose 30gm every 6 hours. RTC  - Continue to take ursodiol 300 mg three times a day  - Continue phytonadione 0.1 mg daily  - Continue rifaximin 550 mg twice a day  - You may  rifaximin prescriptions at Dr. Montalvo' office. Please make an appointment to follow up with him within 3 days of discharge from the hospital.      SECONDARY DISCHARGE DIAGNOSES  Diagnosis: Diabetes mellitus  Assessment and Plan of Treatment: - please continue Lantus 50 units at bedtime  - Please follow up with your Endocrinologist  within 1 week of discharge from hospital  -Follow up FSBS at home    Diagnosis: HLD (hyperlipidemia)  Assessment and Plan of Treatment: - continue with your pravastatin 40 mg daily.   - Please follow up with your PCP within 1 week for a repeat CMP to check your liver function    Diagnosis: GERD with esophagitis  Assessment and Plan of Treatment: - You were found to have a large duodenal ulcer with esophagitis during your last admission to [facility]  - Please continue to take protonix 40 mg  2x daily  - Please continue to take carafate 1 g suspension 4x  daily  - Please continue to take [id number] mL every 4 hours as needed for dyspepsia  -Please follow up with GI -Dr montalvo before completion of your protonix & Carafate as you will need to refill your priscription  - Please continue to use First mouthwash    Diagnosis: Peptic ulcer disease  Assessment and Plan of Treatment: - Please continue protonix, carafate and maalox as described above    Diagnosis: GIB (gastrointestinal bleeding)  Assessment and Plan of Treatment: - You had a bleeding duodenal ulcer, found on your last admission.  - Please continue protonix, carafate and maalox.  - please monitor your stool for bright red blood and/or black or tarry stool.  - please follow up with Dr. Montalvo within 3 days of discharge from hospital.    Diagnosis: Cirrhosis  Assessment and Plan of Treatment: - due to nonalcoholic fatty liver disease  - continue lactulose 30 g q6 daily,   - continue rifaximin 2x day  - continue ursodiol as before  - follow up with Dr. Montalvo    Diagnosis: GERD with esophagitis  Assessment and Plan of Treatment: GERD with esophagitis  -Protonix 40 mg 2x day, Carafate 1 gm q 6 hrs  -Mouth wash    Diagnosis: Thrombocytopenia  Assessment and Plan of Treatment: Thrombocytopenia chronic, 2/2 Liver disease  CBC with DR Rios,

## 2019-04-17 NOTE — DISCHARGE NOTE PROVIDER - HOSPITAL COURSE
From H&P        62 y/o M with PMHx of Type 2 DM on insulin, HTN, HLD, obesity, nephrolithiasis, neuropathy, Hepatic encephalopathy, NAFLD (diagnosed 2/2018), cirrhosis, Hyperammonemia,  Gastritis/ esophagitis,  UGIB s/p EGD, duodenal ulcer, s/p clips and cauterization, and e. coli bacteremia BIB wife for AMS x1d. At the time of presentation no family members present at bedside and patient unable to provide a history due to AMS. The following history is obtained from the wife (Ada) via phone. She reports earlier in the pm prior to admission pt was very confused, was "trying to walk through walls" and thought "kitchen was the bathroom." Additionally, pt told wife he thought it was 2012. Wife reports patient often gets episodes of confusion that correspond to high ammonia levels due to his NAFLD. She reports he has not been very compliant with medications. Wife called GI Dr. Morales earlier today, did not recieve callback from their office and so arrived straight to \[facility]". She reports pt was fine earlier today prior to this event, and the current episode of AMS started with difficulty in word finding.  Of note, patient recently admitted to \[facility]\"" 4/3-4/8 for abd pain, pancreatitis, cirrhosis and portal HTN; GI workup found large duodenal ulcer with esophagitis for which he was treated supportively. At present, wife reports pt denies any fevers, chills, n/v/d, hematemesis, brbpr, black stools or any other symptoms.        In the ED vitals, T98.3, HR70, 152/83, 18, 95%RA    Labs significant for no leukocytosis, H/H 11.7/34.2 (baseline), thrombocytopenia (baseline). Lactate 1.9. Glucose 247, albumin 2.4, calcium 8.3, T bili 2, Alk phosp 334, ammonia 156        CXR (prelim) shows RLL infiltrates    CT head-No acute intracranial bleeding, mass effect, or shift. Mild chronic     microvascular ischemic changes.     EKG shows NSR 97 without ST/T changes        Given NS 1L x1, lactulose 30g x1, urine and blood cx sent        Hospital Course:    Patient admitted for altered mental status 2/2 to hyperammonemia from JEAN. Started on lactulose 30g Q6 and continued on rifaximin, phytonadione, ursodiol. ANGELIKA, Dr. Morales From H&P        62 y/o M with PMHx of Type 2 DM on insulin, HTN, HLD, obesity, nephrolithiasis, neuropathy, Hepatic encephalopathy, NAFLD (diagnosed 2/2018), cirrhosis, Hyperammonemia,  Gastritis/ esophagitis,  UGIB s/p EGD, duodenal ulcer, s/p clips and cauterization, and e. coli bacteremia BIB wife for AMS x1d. At the time of presentation no family members present at bedside and patient unable to provide a history due to AMS. The following history is obtained from the wife (Ada) via phone. She reports earlier in the pm prior to admission pt was very confused, was "trying to walk through walls" and thought "kitchen was the bathroom." Additionally, pt told wife he thought it was 2012. Wife reports patient often gets episodes of confusion that correspond to high ammonia levels due to his NAFLD. She reports he has not been very compliant with medications. Wife called GI Dr. Morales earlier today, did not recieve callback from their office and so arrived straight to [facility]. She reports pt was fine earlier today prior to this event, and the current episode of AMS started with difficulty in word finding.  Of note, patient recently admitted to [facility] 4/3-4/8 for abd pain, pancreatitis, cirrhosis and portal HTN; GI workup found large duodenal ulcer with esophagitis for which he was treated supportively. At present, wife reports pt denies any fevers, chills, n/v/d, hematemesis, brbpr, black stools or any other symptoms.        In the ED vitals, T98.3, HR70, 152/83, 18, 95%RA    Labs significant for no leukocytosis, H/H 11.7/34.2 (baseline), thrombocytopenia (baseline). Lactate 1.9. Glucose 247, albumin 2.4, calcium 8.3, T bili 2, Alk phosp 334, ammonia 156        CXR (prelim) shows RLL infiltrates    CT head-No acute intracranial bleeding, mass effect, or shift. Mild chronic     microvascular ischemic changes.     EKG shows NSR 97 without ST/T changes        Given NS 1L x1, lactulose 30g x1, urine and blood cx sent        Hospital Course:    Patient admitted for altered mental status 2/2 to hyperammonemia from JEAN. Started on lactulose 30g Q6 and continued on rifaximin, phytonadione, ursodiol. GI, Dr. Franks, consulted. Patient had history of GIB with cauterization of deodenal ulcer, no reports of rectal bleeding or dark stool during this admission. Continued on protonix, carafate, maalox and magic mouth wash. Lactulose dose raised to 45g Q6. Ammonia levels trended down, with improvement in mental status. Seen by PT, found to have no PT needs.     Stable for discharge home with instructions to continue lactulose and follow up with GI. From H&P        62 y/o M with PMHx of Type 2 DM on insulin, HTN, HLD, obesity, nephrolithiasis, neuropathy, Hepatic encephalopathy, NAFLD (diagnosed 2/2018), cirrhosis, Hyperammonemia,  Gastritis/ esophagitis,  UGIB s/p EGD, duodenal ulcer, s/p clips and cauterization, and e. coli bacteremia BIB wife for AMS x1d. At the time of presentation no family members present at bedside and patient unable to provide a history due to AMS. The following history is obtained from the wife (Ada) via phone. She reports earlier in the pm prior to admission pt was very confused, was "trying to walk through walls" and thought "kitchen was the bathroom." Additionally, pt told wife he thought it was 2012. Wife reports patient often gets episodes of confusion that correspond to high ammonia levels due to his NAFLD. She reports he has not been very compliant with medications. Wife called GI Dr. Morales earlier today, did not recieve callback from their office and so arrived straight to [facility]. She reports pt was fine earlier today prior to this event, and the current episode of AMS started with difficulty in word finding.  Of note, patient recently admitted to [facility] 4/3-4/8 for abd pain, pancreatitis, cirrhosis and portal HTN; GI workup found large duodenal ulcer with esophagitis for which he was treated supportively. At present, wife reports pt denies any fevers, chills, n/v/d, hematemesis, brbpr, black stools or any other symptoms.        In the ED vitals, T98.3, HR70, 152/83, 18, 95%RA    Labs significant for no leukocytosis, H/H 11.7/34.2 (baseline), thrombocytopenia (baseline). Lactate 1.9. Glucose 247, albumin 2.4, calcium 8.3, T bili 2, Alk phosp 334, ammonia 156        CXR (prelim) shows RLL infiltrates    CT head-No acute intracranial bleeding, mass effect, or shift. Mild chronic     microvascular ischemic changes.     EKG shows NSR 97 without ST/T changes        Given NS 1L x1, lactulose 30g x1, urine and blood cx sent        Hospital Course:    Patient admitted for altered mental status 2/2 to hyperammonemia from [facility]. Started on lactulose 30g Q6 and continued on rifaximin, phytonadione, ursodiol. GI, Dr. Franks, consulted. Patient had history of GIB with cauterization of deodenal ulcer, no reports of rectal bleeding or dark stool during this admission. Continued on protonix, carafate, maalox and magic mouth wash. Lactulose dose raised to 45g Q6. Ammonia levels trended down, with improvement in mental status. Seen by PT, found to have no PT needs.     Stable for discharge home with instructions to continue lactulose 30 g q6 and follow up with GI. From H&P        62 y/o M with PMHx of Type 2 DM on insulin, HTN, HLD, obesity, nephrolithiasis, neuropathy, Hepatic encephalopathy, NAFLD (diagnosed 2/2018), cirrhosis, Hyperammonemia,  Gastritis/ esophagitis,  UGIB s/p EGD, duodenal ulcer, s/p clips and cauterization, and e. coli bacteremia BIB wife for AMS x1d. At the time of presentation no family members present at bedside and patient unable to provide a history due to AMS. The following history is obtained from the wife (Ada) via phone. She reports earlier in the pm prior to admission pt was very confused, was "trying to walk through walls" and thought "kitchen was the bathroom." Additionally, pt told wife he thought it was 2012. Wife reports patient often gets episodes of confusion that correspond to high ammonia levels due to his NAFLD. She reports he has not been very compliant with medications. Wife called GI Dr. Morales earlier today, did not recieve callback from their office and so arrived straight to [facility]. She reports pt was fine earlier today prior to this event, and the current episode of AMS started with difficulty in word finding.  Of note, patient recently admitted to [facility] 4/3-4/8 for abd pain, pancreatitis, cirrhosis and portal HTN; GI workup found large duodenal ulcer with esophagitis for which he was treated supportively. At present, wife reports pt denies any fevers, chills, n/v/d, hematemesis, brbpr, black stools or any other symptoms.        In the ED vitals, T98.3, HR70, 152/83, 18, 95%RA    Labs significant for no leukocytosis, H/H 11.7/34.2 (baseline), thrombocytopenia (baseline). Lactate 1.9. Glucose 247, albumin 2.4, calcium 8.3, T bili 2, Alk phosp 334, ammonia 156        CXR (prelim) shows RLL infiltrates    CT head-No acute intracranial bleeding, mass effect, or shift. Mild chronic     microvascular ischemic changes.     EKG shows NSR 97 without ST/T changes        Given NS 1L x1, lactulose 30g x1, urine and blood cx sent        Hospital Course:    Patient admitted for altered mental status 2/2 to hyperammonemia from JEAN. Started on lactulose 30g Q6 and continued on rifaximin, phytonadione, ursodiol. GI, Dr. Franks, consulted. Patient had history of GIB with cauterization of deodenal ulcer, no reports of rectal bleeding or dark stool during this admission. Continued on protonix, carafate, maalox and magic mouth wash. Lactulose dose raised to 45g Q6. Ammonia levels trended down, with improvement in mental status. Seen by PT, found to have no PT needs.     Stable for discharge home with instructions to continue lactulose 30 g q6 and follow up with GI. Pt stable for d/c .

## 2019-04-17 NOTE — DISCHARGE NOTE PROVIDER - NSDCACTIVITY_GEN_ALL_CORE
Do not drive or operate machinery/Walking - Indoors allowed/Showering allowed/No heavy lifting/straining/Walking - Outdoors allowed

## 2019-04-17 NOTE — DISCHARGE NOTE NURSING/CASE MANAGEMENT/SOCIAL WORK - NSDCDPATPORTLINK_GEN_ALL_CORE
You can access the [facility] Patient Portal, offered by [facility], by registering with the following website: [email or web address]

## 2019-04-17 NOTE — PROGRESS NOTE ADULT - PROBLEM SELECTOR PLAN 3
-Pt with history of GIB s/p clips and cauterization, wife not reporting any bleeding on this admission  -EGD - large Duodenal ulcer with esophagitis during last admission 2 wks prior. 4/9 biopsy results 1. Duodenum, ulcer edge biopsy:  Peptic duodenitis with reactive cellular changes. gastric surface epithelial metaplasia.  Stomach, antrum, biopsy: Chronic inactive gastritis involving gastric antrum and  body-type mucosa. H. Pylori is negative.  -Protonix, 40 mg  BID  -Carafate 4x day  -Maalox q4 PRN   -cont home Magic Mouth Wash to decrease mucosal pain & incr appetite -Pt with history of GIB s/p clips and cauterization, wife not reporting any bleeding on this admission, H/H stable  -EGD - large Duodenal ulcer with esophagitis during last admission 2 wks prior. 4/9 biopsy results 1. Duodenum, ulcer edge biopsy:  Peptic duodenitis with reactive cellular changes. gastric surface epithelial metaplasia.  Stomach, antrum, biopsy: Chronic inactive gastritis involving gastric antrum and  body-type mucosa. H. Pylori is negative.  -Protonix, 40 mg  BID  -Carafate 4x day  -Maalox q4 PRN   -cont home Magic Mouth Wash to decrease mucosal pain & incr appetite

## 2019-04-17 NOTE — PROGRESS NOTE ADULT - PROBLEM SELECTOR PLAN 2
2/2 JEAN  check afp  cont ppi, rifaximin, sandra, lactulose  trend lfts  avoid hepatotoxins  low na/pro diet  supportive care  will need eus as outpt to r/o carcinoma

## 2019-04-22 NOTE — DISCHARGE NOTE ADULT - PROCEDURE 1
Notification Instructions: Patient will be notified of biopsy results. However, patient instructed to call the office if not contacted within 2 weeks. Electrodesiccation Text: The wound bed was treated with electrodesiccation after the biopsy was performed. Curettage Text: The wound bed was treated with curettage after the biopsy was performed. Hemostasis: Electrocautery Biopsy Type: H and E Anesthesia Type: 1% lidocaine with epinephrine Bill For Surgical Tray: no Wound Care: Vaseline Biopsy Method: 10 blade Billing Type: Third-Party Bill Type Of Destruction Used: Curettage Silver Nitrate Text: The wound bed was treated with silver nitrate after the biopsy was performed. Depth Of Biopsy: dermis Electrodesiccation And Curettage Text: The wound bed was treated with electrodesiccation and curettage after the biopsy was performed. Dressing: bandage Cryotherapy Text: The wound bed was treated with cryotherapy after the biopsy was performed. Consent: Written consent was obtained and risks were reviewed including but not limited to scarring, infection, bleeding, scabbing, incomplete removal, nerve damage and allergy to anesthesia.  Clinical evaluation reveals changes suspicious for rule out provided in pathology requisition.  Intent of procedure is to obtain tissue sample for histopathic examination.  A skin biopsy is considered a necessary and appropriate to clarify the diagnosis. Size Of Lesion In Cm: 0.5 Post-Care Instructions: I reviewed with the patient in detail post-care instructions. Patient is to keep the biopsy site dry overnight, and then apply bacitracin twice daily until healed. Patient may apply hydrogen peroxide soaks to remove any crusting. Additional Anesthesia Volume In Cc (Will Not Render If 0): 0 Was A Bandage Applied: Yes Detail Level: Detailed Endoscopy

## 2019-05-03 ENCOUNTER — RESULT REVIEW (OUTPATIENT)
Age: 64
End: 2019-05-03

## 2019-05-03 ENCOUNTER — APPOINTMENT (OUTPATIENT)
Dept: GASTROENTEROLOGY | Facility: HOSPITAL | Age: 64
End: 2019-05-03

## 2019-05-03 ENCOUNTER — OUTPATIENT (OUTPATIENT)
Dept: OUTPATIENT SERVICES | Facility: HOSPITAL | Age: 64
LOS: 1 days | Discharge: HOME | End: 2019-05-03
Payer: MEDICARE

## 2019-05-03 DIAGNOSIS — Z90.49 ACQUIRED ABSENCE OF OTHER SPECIFIED PARTS OF DIGESTIVE TRACT: Chronic | ICD-10-CM

## 2019-05-03 DIAGNOSIS — K72.90 HEPATIC FAILURE, UNSPECIFIED WITHOUT COMA: ICD-10-CM

## 2019-05-03 PROCEDURE — 88305 TISSUE EXAM BY PATHOLOGIST: CPT | Mod: 26

## 2019-05-03 PROCEDURE — 88312 SPECIAL STAINS GROUP 1: CPT | Mod: 26

## 2019-05-03 PROCEDURE — 43239 EGD BIOPSY SINGLE/MULTIPLE: CPT | Mod: GC,59

## 2019-05-03 PROCEDURE — 43259 EGD US EXAM DUODENUM/JEJUNUM: CPT | Mod: GC

## 2019-07-25 ENCOUNTER — APPOINTMENT (OUTPATIENT)
Dept: GASTROENTEROLOGY | Facility: CLINIC | Age: 64
End: 2019-07-25

## 2019-08-08 NOTE — H&P ADULT - NSICDXFAMILYHX_GEN_ALL_CORE_FT
08-Aug-2019 23:26
FAMILY HISTORY:  Family history of hypertension  Family history of stomach cancer  Family history of type 2 diabetes mellitus

## 2019-10-08 ENCOUNTER — INPATIENT (INPATIENT)
Facility: HOSPITAL | Age: 64
LOS: 3 days | Discharge: HOME | DRG: 441 | End: 2019-10-12
Attending: INTERNAL MEDICINE | Admitting: INTERNAL MEDICINE
Payer: MEDICARE

## 2019-10-08 VITALS
RESPIRATION RATE: 16 BRPM | WEIGHT: 315 LBS | TEMPERATURE: 98 F | SYSTOLIC BLOOD PRESSURE: 126 MMHG | HEART RATE: 90 BPM | OXYGEN SATURATION: 100 % | DIASTOLIC BLOOD PRESSURE: 63 MMHG

## 2019-10-08 DIAGNOSIS — K74.60 UNSPECIFIED CIRRHOSIS OF LIVER: ICD-10-CM

## 2019-10-08 DIAGNOSIS — I11.0 HYPERTENSIVE HEART DISEASE WITH HEART FAILURE: ICD-10-CM

## 2019-10-08 DIAGNOSIS — Z90.49 ACQUIRED ABSENCE OF OTHER SPECIFIED PARTS OF DIGESTIVE TRACT: Chronic | ICD-10-CM

## 2019-10-08 DIAGNOSIS — R60.1 GENERALIZED EDEMA: ICD-10-CM

## 2019-10-08 DIAGNOSIS — E66.9 OBESITY, UNSPECIFIED: ICD-10-CM

## 2019-10-08 DIAGNOSIS — K75.81 NONALCOHOLIC STEATOHEPATITIS (NASH): ICD-10-CM

## 2019-10-08 DIAGNOSIS — E11.40 TYPE 2 DIABETES MELLITUS WITH DIABETIC NEUROPATHY, UNSPECIFIED: ICD-10-CM

## 2019-10-08 DIAGNOSIS — I50.30 UNSPECIFIED DIASTOLIC (CONGESTIVE) HEART FAILURE: ICD-10-CM

## 2019-10-08 DIAGNOSIS — K31.811 ANGIODYSPLASIA OF STOMACH AND DUODENUM WITH BLEEDING: ICD-10-CM

## 2019-10-08 DIAGNOSIS — Z79.4 LONG TERM (CURRENT) USE OF INSULIN: ICD-10-CM

## 2019-10-08 DIAGNOSIS — D69.59 OTHER SECONDARY THROMBOCYTOPENIA: ICD-10-CM

## 2019-10-08 DIAGNOSIS — R18.8 OTHER ASCITES: ICD-10-CM

## 2019-10-08 DIAGNOSIS — E78.00 PURE HYPERCHOLESTEROLEMIA, UNSPECIFIED: ICD-10-CM

## 2019-10-08 DIAGNOSIS — D63.8 ANEMIA IN OTHER CHRONIC DISEASES CLASSIFIED ELSEWHERE: ICD-10-CM

## 2019-10-08 DIAGNOSIS — E11.649 TYPE 2 DIABETES MELLITUS WITH HYPOGLYCEMIA WITHOUT COMA: ICD-10-CM

## 2019-10-08 DIAGNOSIS — K21.9 GASTRO-ESOPHAGEAL REFLUX DISEASE WITHOUT ESOPHAGITIS: ICD-10-CM

## 2019-10-08 LAB
ALBUMIN SERPL ELPH-MCNC: 2.3 G/DL — LOW (ref 3.3–5)
ALP SERPL-CCNC: 216 U/L — HIGH (ref 40–120)
ALT FLD-CCNC: 30 U/L — SIGNIFICANT CHANGE UP (ref 12–78)
AMMONIA BLD-MCNC: 30 UMOL/L — SIGNIFICANT CHANGE UP (ref 11–32)
ANION GAP SERPL CALC-SCNC: 6 MMOL/L — SIGNIFICANT CHANGE UP (ref 5–17)
APTT BLD: 35.2 SEC — SIGNIFICANT CHANGE UP (ref 28.5–37)
AST SERPL-CCNC: 49 U/L — HIGH (ref 15–37)
BASOPHILS # BLD AUTO: 0.07 K/UL — SIGNIFICANT CHANGE UP (ref 0–0.2)
BASOPHILS NFR BLD AUTO: 1.6 % — SIGNIFICANT CHANGE UP (ref 0–2)
BILIRUB SERPL-MCNC: 4.6 MG/DL — HIGH (ref 0.2–1.2)
BUN SERPL-MCNC: 15 MG/DL — SIGNIFICANT CHANGE UP (ref 7–23)
CALCIUM SERPL-MCNC: 8.3 MG/DL — LOW (ref 8.5–10.1)
CHLORIDE SERPL-SCNC: 105 MMOL/L — SIGNIFICANT CHANGE UP (ref 96–108)
CO2 SERPL-SCNC: 27 MMOL/L — SIGNIFICANT CHANGE UP (ref 22–31)
CREAT SERPL-MCNC: 1.2 MG/DL — SIGNIFICANT CHANGE UP (ref 0.5–1.3)
EOSINOPHIL # BLD AUTO: 0.05 K/UL — SIGNIFICANT CHANGE UP (ref 0–0.5)
EOSINOPHIL NFR BLD AUTO: 1.2 % — SIGNIFICANT CHANGE UP (ref 0–6)
GLUCOSE SERPL-MCNC: 65 MG/DL — LOW (ref 70–99)
HCT VFR BLD CALC: 24.5 % — LOW (ref 39–50)
HGB BLD-MCNC: 8.2 G/DL — LOW (ref 13–17)
IMM GRANULOCYTES NFR BLD AUTO: 0.5 % — SIGNIFICANT CHANGE UP (ref 0–1.5)
INR BLD: 1.78 RATIO — HIGH (ref 0.88–1.16)
LYMPHOCYTES # BLD AUTO: 1.15 K/UL — SIGNIFICANT CHANGE UP (ref 1–3.3)
LYMPHOCYTES # BLD AUTO: 26.6 % — SIGNIFICANT CHANGE UP (ref 13–44)
MCHC RBC-ENTMCNC: 33.5 GM/DL — SIGNIFICANT CHANGE UP (ref 32–36)
MCHC RBC-ENTMCNC: 34.3 PG — HIGH (ref 27–34)
MCV RBC AUTO: 102.5 FL — HIGH (ref 80–100)
MONOCYTES # BLD AUTO: 0.54 K/UL — SIGNIFICANT CHANGE UP (ref 0–0.9)
MONOCYTES NFR BLD AUTO: 12.5 % — SIGNIFICANT CHANGE UP (ref 2–14)
NEUTROPHILS # BLD AUTO: 2.5 K/UL — SIGNIFICANT CHANGE UP (ref 1.8–7.4)
NEUTROPHILS NFR BLD AUTO: 57.6 % — SIGNIFICANT CHANGE UP (ref 43–77)
NRBC # BLD: 0 /100 WBCS — SIGNIFICANT CHANGE UP (ref 0–0)
PLATELET # BLD AUTO: 100 K/UL — LOW (ref 150–400)
POTASSIUM SERPL-MCNC: 3.7 MMOL/L — SIGNIFICANT CHANGE UP (ref 3.5–5.3)
POTASSIUM SERPL-SCNC: 3.7 MMOL/L — SIGNIFICANT CHANGE UP (ref 3.5–5.3)
PROT SERPL-MCNC: 6 G/DL — SIGNIFICANT CHANGE UP (ref 6–8.3)
PROTHROM AB SERPL-ACNC: 20.7 SEC — HIGH (ref 10–12.9)
RBC # BLD: 2.39 M/UL — LOW (ref 4.2–5.8)
RBC # FLD: 16.2 % — HIGH (ref 10.3–14.5)
SODIUM SERPL-SCNC: 138 MMOL/L — SIGNIFICANT CHANGE UP (ref 135–145)
WBC # BLD: 4.33 K/UL — SIGNIFICANT CHANGE UP (ref 3.8–10.5)
WBC # FLD AUTO: 4.33 K/UL — SIGNIFICANT CHANGE UP (ref 3.8–10.5)

## 2019-10-08 PROCEDURE — 93010 ELECTROCARDIOGRAM REPORT: CPT

## 2019-10-08 PROCEDURE — 74176 CT ABD & PELVIS W/O CONTRAST: CPT | Mod: 26

## 2019-10-08 PROCEDURE — 71045 X-RAY EXAM CHEST 1 VIEW: CPT | Mod: 26

## 2019-10-08 PROCEDURE — 99285 EMERGENCY DEPT VISIT HI MDM: CPT

## 2019-10-08 RX ORDER — FUROSEMIDE 40 MG
40 TABLET ORAL DAILY
Refills: 0 | Status: DISCONTINUED | OUTPATIENT
Start: 2019-10-08 | End: 2019-10-08

## 2019-10-08 RX ORDER — DEXTROSE 50 % IN WATER 50 %
12.5 SYRINGE (ML) INTRAVENOUS ONCE
Refills: 0 | Status: DISCONTINUED | OUTPATIENT
Start: 2019-10-08 | End: 2019-10-12

## 2019-10-08 RX ORDER — INSULIN GLARGINE 100 [IU]/ML
45 INJECTION, SOLUTION SUBCUTANEOUS AT BEDTIME
Refills: 0 | Status: DISCONTINUED | OUTPATIENT
Start: 2019-10-08 | End: 2019-10-09

## 2019-10-08 RX ORDER — INSULIN LISPRO 100/ML
VIAL (ML) SUBCUTANEOUS AT BEDTIME
Refills: 0 | Status: DISCONTINUED | OUTPATIENT
Start: 2019-10-08 | End: 2019-10-12

## 2019-10-08 RX ORDER — FUROSEMIDE 40 MG
20 TABLET ORAL EVERY 12 HOURS
Refills: 0 | Status: DISCONTINUED | OUTPATIENT
Start: 2019-10-08 | End: 2019-10-09

## 2019-10-08 RX ORDER — PANTOPRAZOLE SODIUM 20 MG/1
40 TABLET, DELAYED RELEASE ORAL
Refills: 0 | Status: DISCONTINUED | OUTPATIENT
Start: 2019-10-08 | End: 2019-10-12

## 2019-10-08 RX ORDER — DEXTROSE 50 % IN WATER 50 %
25 SYRINGE (ML) INTRAVENOUS ONCE
Refills: 0 | Status: DISCONTINUED | OUTPATIENT
Start: 2019-10-08 | End: 2019-10-12

## 2019-10-08 RX ORDER — URSODIOL 250 MG/1
300 TABLET, FILM COATED ORAL THREE TIMES A DAY
Refills: 0 | Status: DISCONTINUED | OUTPATIENT
Start: 2019-10-08 | End: 2019-10-12

## 2019-10-08 RX ORDER — DEXTROSE 50 % IN WATER 50 %
15 SYRINGE (ML) INTRAVENOUS ONCE
Refills: 0 | Status: DISCONTINUED | OUTPATIENT
Start: 2019-10-08 | End: 2019-10-12

## 2019-10-08 RX ORDER — LACTULOSE 10 G/15ML
20 SOLUTION ORAL
Refills: 0 | Status: DISCONTINUED | OUTPATIENT
Start: 2019-10-08 | End: 2019-10-09

## 2019-10-08 RX ORDER — SODIUM CHLORIDE 9 MG/ML
1000 INJECTION, SOLUTION INTRAVENOUS
Refills: 0 | Status: DISCONTINUED | OUTPATIENT
Start: 2019-10-08 | End: 2019-10-12

## 2019-10-08 RX ORDER — GABAPENTIN 400 MG/1
200 CAPSULE ORAL
Refills: 0 | Status: DISCONTINUED | OUTPATIENT
Start: 2019-10-08 | End: 2019-10-12

## 2019-10-08 RX ORDER — SPIRONOLACTONE 25 MG/1
25 TABLET, FILM COATED ORAL DAILY
Refills: 0 | Status: DISCONTINUED | OUTPATIENT
Start: 2019-10-08 | End: 2019-10-12

## 2019-10-08 RX ORDER — DEXTROSE 50 % IN WATER 50 %
50 SYRINGE (ML) INTRAVENOUS ONCE
Refills: 0 | Status: COMPLETED | OUTPATIENT
Start: 2019-10-08 | End: 2019-10-08

## 2019-10-08 RX ORDER — INSULIN LISPRO 100/ML
VIAL (ML) SUBCUTANEOUS
Refills: 0 | Status: DISCONTINUED | OUTPATIENT
Start: 2019-10-08 | End: 2019-10-12

## 2019-10-08 RX ORDER — GLUCAGON INJECTION, SOLUTION 0.5 MG/.1ML
1 INJECTION, SOLUTION SUBCUTANEOUS ONCE
Refills: 0 | Status: DISCONTINUED | OUTPATIENT
Start: 2019-10-08 | End: 2019-10-12

## 2019-10-08 RX ADMIN — Medication 50 MILLILITER(S): at 19:37

## 2019-10-08 RX ADMIN — LACTULOSE 20 GRAM(S): 10 SOLUTION ORAL at 23:53

## 2019-10-08 RX ADMIN — INSULIN GLARGINE 45 UNIT(S): 100 INJECTION, SOLUTION SUBCUTANEOUS at 22:17

## 2019-10-08 RX ADMIN — URSODIOL 300 MILLIGRAM(S): 250 TABLET, FILM COATED ORAL at 21:52

## 2019-10-08 RX ADMIN — Medication 20 MILLIGRAM(S): at 21:48

## 2019-10-08 RX ADMIN — GABAPENTIN 200 MILLIGRAM(S): 400 CAPSULE ORAL at 23:53

## 2019-10-08 NOTE — ED ADULT NURSE NOTE - NSIMPLEMENTINTERV_GEN_ALL_ED
Implemented All Fall with Harm Risk Interventions:  [place] to call system. Call bell, personal items and telephone within reach. Instruct patient to call for assistance. Room bathroom lighting operational. Non-slip footwear when patient is off stretcher. Physically safe environment: no spills, clutter or unnecessary equipment. Stretcher in lowest position, wheels locked, appropriate side rails in place. Provide visual cue, wrist band, yellow gown, etc. Monitor gait and stability. Monitor for mental status changes and reorient to person, place, and time. Review medications for side effects contributing to fall risk. Reinforce activity limits and safety measures with patient and family. Provide visual clues: red socks.

## 2019-10-08 NOTE — ED PROVIDER NOTE - OBJECTIVE STATEMENT
63 y male presents with states for 2 months has gained "a lot of weight", abdomen is distended, bilateral le swelling, pitting edema,  states he has hx of non alcoholic liver cirrhosis,  seen by his PMD Dr Satish Coto today sent for admission.  states seen by his urologist on Thursday last week,  cannot recall his name.  GI Dr Morales.  nonsmoker, denies etoh.  denies chest pain, sob, fever, nv.  states he has been constipated for 3 days, and has decreased urine output. 63 y male presents with states for 2 months has gained "a lot of weight", in his abdomen and bilateral le, abdomen is distended, bilateral le swelling, pitting edema,  states he has hx of non alcoholic liver cirrhosis,  seen by his PMD Dr Satish Coto today sent for admission.  states seen by his urologist on Thursday last week,  cannot recall his name.  GI Dr Morales.  nonsmoker, denies etoh.  denies chest pain, sob, fever, nv, diarrhea.  states he has been constipated for 3 days, and has decreased urine output.

## 2019-10-08 NOTE — ED ADULT TRIAGE NOTE - CHIEF COMPLAINT QUOTE
C/O weakness. As per Patient bleeding from liver. Constipation / 3 days. Unable to urinate since this morning

## 2019-10-08 NOTE — ED ADULT NURSE NOTE - OBJECTIVE STATEMENT
Patient received in wheelchair from waiting room with multiple complaints. Patient is alert and oriented x 4 ambulatory with steady gait. Patient c/o "My doctor sent me here because I had some liver stuff in my urine". Patient states "I think I had blood in my urine so I saw my urine doctor", patient also reports 60lbs of weight gain over 2 months with +abdominal distention, b/l LE pitting +3 edema and patient reports decreased PO intake and that he is unsure of last BM. Per picture of script on patient's daughter's phone patient sent to ED for admission d/t anasarca. Patient reports h/o non ETOH liver cirrhosis sees Dr. Vaca- GI.   Denies dark stools, shortness of breath, no fever, or chest pain.   IV placed, labs drawn and sent.

## 2019-10-08 NOTE — ED PROVIDER NOTE - ATTENDING CONTRIBUTION TO CARE
64 yo male hx of liver cirrhosis sent in by PMD Dr. Coto for gaining a lot of weight in past 2 months, family noticed patient to be more jaundiced, c/o abdominal swelling and decreased urine output and constipation.  No fever/chills.  No shortness of breath.  No abdominal pain.    Gen: Alert, NAD, mildly jaundiced  Head/eyes: NC/AT, PERRL, EOMI, normal lids/conjunctiva, +mild scleral icterus  ENT: airway patent  Neck: supple, no tenderness/meningismus/JVD, Trachea midline  Pulm/lung: Bilateral clear BS, normal resp effort, no wheeze/stridor/retractions  CV/heart: RRR, no M/R/G, +2 dist pulses (radial, pedal DP/PT, popliteal)  GI/Abd: soft, NT/+distended, +BS, no guarding/rebound tenderness  Musculoskeletal: no edema/erythema/cyanosis, FROM in all extremities, no C/T/L spine ttp  Skin: no rash, no vesicles, no petechaie, no ecchymosis, +2 pitting edeme b/l tib/fib  Neuro: AAOx3, CN 2-12 intact, normal sensation, 5/5 motor strength in all extremities    tbili 4.6, CT +moderate pelvic ascites, admit, GI consult

## 2019-10-09 ENCOUNTER — RESULT REVIEW (OUTPATIENT)
Age: 64
End: 2019-10-09

## 2019-10-09 DIAGNOSIS — K74.60 UNSPECIFIED CIRRHOSIS OF LIVER: ICD-10-CM

## 2019-10-09 DIAGNOSIS — E11.69 TYPE 2 DIABETES MELLITUS WITH OTHER SPECIFIED COMPLICATION: ICD-10-CM

## 2019-10-09 DIAGNOSIS — R14.0 ABDOMINAL DISTENSION (GASEOUS): ICD-10-CM

## 2019-10-09 DIAGNOSIS — D64.9 ANEMIA, UNSPECIFIED: ICD-10-CM

## 2019-10-09 LAB
ALBUMIN FLD-MCNC: 0.4 G/DL — SIGNIFICANT CHANGE UP
ALBUMIN SERPL ELPH-MCNC: 2 G/DL — LOW (ref 3.3–5)
ALP SERPL-CCNC: 184 U/L — HIGH (ref 40–120)
ALT FLD-CCNC: 27 U/L — SIGNIFICANT CHANGE UP (ref 12–78)
AMMONIA BLD-MCNC: 55 UMOL/L — HIGH (ref 11–32)
AMYLASE P1 CFR SERPL: 31 U/L — SIGNIFICANT CHANGE UP (ref 25–125)
ANION GAP SERPL CALC-SCNC: 9 MMOL/L — SIGNIFICANT CHANGE UP (ref 5–17)
APPEARANCE UR: CLEAR — SIGNIFICANT CHANGE UP
AST SERPL-CCNC: 44 U/L — HIGH (ref 15–37)
B PERT IGG+IGM PNL SER: ABNORMAL
BASOPHILS # BLD AUTO: 0.05 K/UL — SIGNIFICANT CHANGE UP (ref 0–0.2)
BASOPHILS NFR BLD AUTO: 1.4 % — SIGNIFICANT CHANGE UP (ref 0–2)
BILIRUB DIRECT SERPL-MCNC: 3 MG/DL — HIGH (ref 0.05–0.2)
BILIRUB INDIRECT FLD-MCNC: 1.3 MG/DL — HIGH (ref 0.2–1)
BILIRUB SERPL-MCNC: 4.3 MG/DL — HIGH (ref 0.2–1.2)
BILIRUB UR-MCNC: ABNORMAL
BUN SERPL-MCNC: 15 MG/DL — SIGNIFICANT CHANGE UP (ref 7–23)
CALCIUM SERPL-MCNC: 8.1 MG/DL — LOW (ref 8.5–10.1)
CHLORIDE SERPL-SCNC: 105 MMOL/L — SIGNIFICANT CHANGE UP (ref 96–108)
CO2 SERPL-SCNC: 26 MMOL/L — SIGNIFICANT CHANGE UP (ref 22–31)
COLOR FLD: YELLOW — SIGNIFICANT CHANGE UP
COLOR SPEC: YELLOW — SIGNIFICANT CHANGE UP
CREAT SERPL-MCNC: 1.2 MG/DL — SIGNIFICANT CHANGE UP (ref 0.5–1.3)
DIFF PNL FLD: ABNORMAL
EOSINOPHIL # BLD AUTO: 0.06 K/UL — SIGNIFICANT CHANGE UP (ref 0–0.5)
EOSINOPHIL NFR BLD AUTO: 1.7 % — SIGNIFICANT CHANGE UP (ref 0–6)
FERRITIN SERPL-MCNC: 70 NG/ML — SIGNIFICANT CHANGE UP (ref 30–400)
FLUID INTAKE SUBSTANCE CLASS: SIGNIFICANT CHANGE UP
FLUID SEGMENTED GRANULOCYTES: 5 % — SIGNIFICANT CHANGE UP
FOLATE SERPL-MCNC: 12.2 NG/ML — SIGNIFICANT CHANGE UP
GLUCOSE FLD-MCNC: 115 MG/DL — SIGNIFICANT CHANGE UP
GLUCOSE SERPL-MCNC: 106 MG/DL — HIGH (ref 70–99)
GLUCOSE UR QL: NEGATIVE — SIGNIFICANT CHANGE UP
GRAM STN FLD: SIGNIFICANT CHANGE UP
HCT VFR BLD CALC: 23.1 % — LOW (ref 39–50)
HGB BLD-MCNC: 7.8 G/DL — LOW (ref 13–17)
IMM GRANULOCYTES NFR BLD AUTO: 0.3 % — SIGNIFICANT CHANGE UP (ref 0–1.5)
IRON SATN MFR SERPL: 21 % — SIGNIFICANT CHANGE UP (ref 16–55)
IRON SATN MFR SERPL: 48 UG/DL — SIGNIFICANT CHANGE UP (ref 45–165)
KETONES UR-MCNC: NEGATIVE — SIGNIFICANT CHANGE UP
LDH SERPL L TO P-CCNC: 62 U/L — SIGNIFICANT CHANGE UP
LEUKOCYTE ESTERASE UR-ACNC: ABNORMAL
LIDOCAIN IGE QN: 105 U/L — SIGNIFICANT CHANGE UP (ref 73–393)
LYMPHOCYTES # BLD AUTO: 1.08 K/UL — SIGNIFICANT CHANGE UP (ref 1–3.3)
LYMPHOCYTES # BLD AUTO: 30.2 % — SIGNIFICANT CHANGE UP (ref 13–44)
LYMPHOCYTES # FLD: 33 % — SIGNIFICANT CHANGE UP
MAGNESIUM SERPL-MCNC: 1.7 MG/DL — SIGNIFICANT CHANGE UP (ref 1.6–2.6)
MCHC RBC-ENTMCNC: 33.8 GM/DL — SIGNIFICANT CHANGE UP (ref 32–36)
MCHC RBC-ENTMCNC: 34.4 PG — HIGH (ref 27–34)
MCV RBC AUTO: 101.8 FL — HIGH (ref 80–100)
MESOTHL CELL # FLD: 2 % — SIGNIFICANT CHANGE UP
MONOCYTES # BLD AUTO: 0.44 K/UL — SIGNIFICANT CHANGE UP (ref 0–0.9)
MONOCYTES NFR BLD AUTO: 12.3 % — SIGNIFICANT CHANGE UP (ref 2–14)
MONOS+MACROS # FLD: 60 % — SIGNIFICANT CHANGE UP
NEUTROPHILS # BLD AUTO: 1.94 K/UL — SIGNIFICANT CHANGE UP (ref 1.8–7.4)
NEUTROPHILS NFR BLD AUTO: 54.1 % — SIGNIFICANT CHANGE UP (ref 43–77)
NITRITE UR-MCNC: NEGATIVE — SIGNIFICANT CHANGE UP
NRBC # BLD: 0 /100 WBCS — SIGNIFICANT CHANGE UP (ref 0–0)
NT-PROBNP SERPL-SCNC: 81 PG/ML — SIGNIFICANT CHANGE UP (ref 0–125)
PH UR: 5 — SIGNIFICANT CHANGE UP (ref 5–8)
PLATELET # BLD AUTO: 82 K/UL — LOW (ref 150–400)
POTASSIUM SERPL-MCNC: 3.6 MMOL/L — SIGNIFICANT CHANGE UP (ref 3.5–5.3)
POTASSIUM SERPL-SCNC: 3.6 MMOL/L — SIGNIFICANT CHANGE UP (ref 3.5–5.3)
PROT FLD-MCNC: <1 G/DL — SIGNIFICANT CHANGE UP
PROT SERPL-MCNC: 5.5 G/DL — LOW (ref 6–8.3)
PROT UR-MCNC: NEGATIVE — SIGNIFICANT CHANGE UP
RBC # BLD: 2.27 M/UL — LOW (ref 4.2–5.8)
RBC # BLD: 2.27 M/UL — LOW (ref 4.2–5.8)
RBC # FLD: 16.5 % — HIGH (ref 10.3–14.5)
RCV VOL RI: 5000 /UL — HIGH (ref 0–0)
RETICS #: 125.3 K/UL — HIGH (ref 25–125)
RETICS/RBC NFR: 5.5 % — HIGH (ref 0.5–2.5)
SODIUM SERPL-SCNC: 140 MMOL/L — SIGNIFICANT CHANGE UP (ref 135–145)
SP GR SPEC: 1.02 — SIGNIFICANT CHANGE UP (ref 1.01–1.02)
SPECIMEN SOURCE: SIGNIFICANT CHANGE UP
TIBC SERPL-MCNC: 231 UG/DL — SIGNIFICANT CHANGE UP (ref 220–430)
TOTAL NUCLEATED CELL COUNT, BODY FLUID: 232 /UL — SIGNIFICANT CHANGE UP
TUBE TYPE: SIGNIFICANT CHANGE UP
UIBC SERPL-MCNC: 183 UG/DL — SIGNIFICANT CHANGE UP (ref 110–370)
UROBILINOGEN FLD QL: 1
VIT B12 SERPL-MCNC: 1264 PG/ML — HIGH (ref 232–1245)
WBC # BLD: 3.58 K/UL — LOW (ref 3.8–10.5)
WBC # FLD AUTO: 3.58 K/UL — LOW (ref 3.8–10.5)

## 2019-10-09 PROCEDURE — 88305 TISSUE EXAM BY PATHOLOGIST: CPT | Mod: 26

## 2019-10-09 PROCEDURE — 49083 ABD PARACENTESIS W/IMAGING: CPT

## 2019-10-09 PROCEDURE — 88108 CYTOPATH CONCENTRATE TECH: CPT | Mod: 26

## 2019-10-09 RX ORDER — CHOLECALCIFEROL (VITAMIN D3) 125 MCG
1000 CAPSULE ORAL DAILY
Refills: 0 | Status: DISCONTINUED | OUTPATIENT
Start: 2019-10-09 | End: 2019-10-12

## 2019-10-09 RX ORDER — SUCRALFATE 1 G
1 TABLET ORAL EVERY 6 HOURS
Refills: 0 | Status: DISCONTINUED | OUTPATIENT
Start: 2019-10-09 | End: 2019-10-12

## 2019-10-09 RX ORDER — NEOMYCIN/POLYMYXIN B/HYDROCORT
2 SUSPENSION, DROPS(FINAL DOSAGE FORM)(ML) OTIC (EAR) EVERY 8 HOURS
Refills: 0 | Status: DISCONTINUED | OUTPATIENT
Start: 2019-10-09 | End: 2019-10-12

## 2019-10-09 RX ORDER — LIDOCAINE 4 G/100G
1 CREAM TOPICAL DAILY
Refills: 0 | Status: DISCONTINUED | OUTPATIENT
Start: 2019-10-09 | End: 2019-10-12

## 2019-10-09 RX ORDER — FUROSEMIDE 40 MG
40 TABLET ORAL EVERY 12 HOURS
Refills: 0 | Status: DISCONTINUED | OUTPATIENT
Start: 2019-10-09 | End: 2019-10-12

## 2019-10-09 RX ORDER — LACTULOSE 10 G/15ML
30 SOLUTION ORAL
Refills: 0 | Status: DISCONTINUED | OUTPATIENT
Start: 2019-10-09 | End: 2019-10-10

## 2019-10-09 RX ORDER — INSULIN GLARGINE 100 [IU]/ML
40 INJECTION, SOLUTION SUBCUTANEOUS EVERY MORNING
Refills: 0 | Status: DISCONTINUED | OUTPATIENT
Start: 2019-10-09 | End: 2019-10-11

## 2019-10-09 RX ADMIN — GABAPENTIN 200 MILLIGRAM(S): 400 CAPSULE ORAL at 12:53

## 2019-10-09 RX ADMIN — URSODIOL 300 MILLIGRAM(S): 250 TABLET, FILM COATED ORAL at 13:00

## 2019-10-09 RX ADMIN — LACTULOSE 30 GRAM(S): 10 SOLUTION ORAL at 14:15

## 2019-10-09 RX ADMIN — Medication 20 MILLIGRAM(S): at 05:38

## 2019-10-09 RX ADMIN — LACTULOSE 30 GRAM(S): 10 SOLUTION ORAL at 22:54

## 2019-10-09 RX ADMIN — Medication 1 GRAM(S): at 22:59

## 2019-10-09 RX ADMIN — Medication 2 DROP(S): at 18:11

## 2019-10-09 RX ADMIN — GABAPENTIN 200 MILLIGRAM(S): 400 CAPSULE ORAL at 17:57

## 2019-10-09 RX ADMIN — LIDOCAINE 1 PATCH: 4 CREAM TOPICAL at 19:40

## 2019-10-09 RX ADMIN — GABAPENTIN 200 MILLIGRAM(S): 400 CAPSULE ORAL at 05:38

## 2019-10-09 RX ADMIN — Medication 40 MILLIGRAM(S): at 18:10

## 2019-10-09 RX ADMIN — GABAPENTIN 200 MILLIGRAM(S): 400 CAPSULE ORAL at 22:58

## 2019-10-09 RX ADMIN — Medication 1 GRAM(S): at 17:58

## 2019-10-09 RX ADMIN — PANTOPRAZOLE SODIUM 40 MILLIGRAM(S): 20 TABLET, DELAYED RELEASE ORAL at 17:58

## 2019-10-09 RX ADMIN — LACTULOSE 20 GRAM(S): 10 SOLUTION ORAL at 05:39

## 2019-10-09 RX ADMIN — PANTOPRAZOLE SODIUM 40 MILLIGRAM(S): 20 TABLET, DELAYED RELEASE ORAL at 05:38

## 2019-10-09 RX ADMIN — URSODIOL 300 MILLIGRAM(S): 250 TABLET, FILM COATED ORAL at 22:54

## 2019-10-09 RX ADMIN — SPIRONOLACTONE 25 MILLIGRAM(S): 25 TABLET, FILM COATED ORAL at 05:38

## 2019-10-09 RX ADMIN — LIDOCAINE 1 PATCH: 4 CREAM TOPICAL at 14:18

## 2019-10-09 RX ADMIN — LACTULOSE 30 GRAM(S): 10 SOLUTION ORAL at 17:58

## 2019-10-09 RX ADMIN — Medication 1000 UNIT(S): at 17:58

## 2019-10-09 RX ADMIN — Medication 2 DROP(S): at 22:55

## 2019-10-09 NOTE — H&P ADULT - HISTORY OF PRESENT ILLNESS
63 male with non-alcoholic cirrhosis, GERD, GIB, hepatic encephalopathy presents with states for 2 months has gained "a lot of weight" -- 50# in his abdomen and bilateral le, abdomen is distended, bilateral le swelling, pitting edema,  states he has hx of non alcoholic liver cirrhosis,  seen by his PMD Dr Satish Coto today sent for admission. States seen by his urologist on Thursday last week,  cannot recall his name.  GI Dr Morales.  nonsmoker, denies etoh.  denies chest pain, sob, fever, nv, diarrhea.  States he has been constipated for 3 days, and has decreased urine output. Dark stool.

## 2019-10-09 NOTE — CONSULT NOTE ADULT - PROBLEM SELECTOR RECOMMENDATION 3
hx of gib  hgb ~12 in 4/2019; reports darker stools at home  check fobt  cont ppi bid  monitor cbc, transfuse prn  may need further eval w egd

## 2019-10-09 NOTE — CONSULT NOTE ADULT - SUBJECTIVE AND OBJECTIVE BOX
Chief Complaint:  Patient is a 63y old  Male who presents with a chief complaint of abd dt    GIB (gastrointestinal bleeding)  GERD with esophagitis  Hepatic encephalopathy  Obesity  Fatty liver disease, nonalcoholic  Renal stones  Hypertension  Neuropathy  Hypercholesteremia  Diabetes  S/P cholecystectomy  No significant past surgical history     HPI: 63 y male presents with states for 2 months has gained "a lot of weight", in his abdomen and bilateral le, abdomen is distended, bilateral le swelling, pitting edema,  states he has hx of non alcoholic liver cirrhosis,  seen by his PMD Dr Satish Coto today sent for admission.  states seen by his urologist on Thursday last week,  cannot recall his name.  GI Dr Morales.  nonsmoker, denies etoh.  denies chest pain, sob, fever, nv, diarrhea.  states he has been constipated for 3 days, and has decreased urine output.    gi consulted for ascites. chart reviewed, well known to service, seen inpt 2019 for he, suspected 2/2 med non compliance. last seen in op office  for hospital f/u; pt seen and examined.    recent  vs/labs/imaging reviewed- avss  pancytopenic  inr 1.78  ammonia 55  elev lfts  ct a/p as below  last mri  2019- Impression: Cirrhosis with prominent portosystemic varices suggesting portal hypertension. no obvious liver lesions. Trace ascites.    last egd 2019- severe esophagitis, gastritis, duodenal ulceration and hh  last colon 2018- 2 polyps, small hemorrhoids     codeine (Anaphylaxis)      dextrose 40% Gel 15 Gram(s) Oral once PRN  dextrose 5%. 1000 milliLiter(s) IV Continuous <Continuous>  dextrose 50% Injectable 12.5 Gram(s) IV Push once  dextrose 50% Injectable 25 Gram(s) IV Push once  dextrose 50% Injectable 25 Gram(s) IV Push once  furosemide   Injectable 20 milliGRAM(s) IV Push every 12 hours  gabapentin 200 milliGRAM(s) Oral four times a day  glucagon  Injectable 1 milliGRAM(s) IntraMuscular once PRN  insulin glargine Injectable (LANTUS) 45 Unit(s) SubCutaneous at bedtime  insulin lispro (HumaLOG) corrective regimen sliding scale   SubCutaneous three times a day before meals  insulin lispro (HumaLOG) corrective regimen sliding scale   SubCutaneous at bedtime  lactulose Syrup 20 Gram(s) Oral four times a day  pantoprazole    Tablet 40 milliGRAM(s) Oral two times a day  rifaximin 550 milliGRAM(s) Oral two times a day  spironolactone 25 milliGRAM(s) Oral daily  ursodiol Capsule 300 milliGRAM(s) Oral three times a day        FAMILY HISTORY:  Family history of type 2 diabetes mellitus  Family history of hypertension  Family history of stomach cancer        Review of Systems:    General:  No wt loss, fevers, chills, night sweats, fatigue  Eyes:  Good vision, no reported pain  ENT:  No sore throat, pain, runny nose, dysphagia  CV:  No pain, palpitations, no lightheadedness  Resp:  No dyspnea, cough, tachypnea, wheezing  GI: see above  :  No pain, bleeding, incontinence, nocturia  Muscle:  No pain, weakness  Neuro:  No weakness, tingling, memory problems  Psych:  No fatigue, insomnia, mood problems, depression  Endocrine:  No polyuria, polydypsia, cold/heat intolerance  Heme:  No petechiae, ecchymosis, easy bruisability  Skin:  No rash, tattoos, scars, edema    Relevant Family History:   n/c    Relevant Social History: n/c      Physical Exam:    Vital Signs:  Vital Signs Last 24 Hrs  T(C): 36.5 (09 Oct 2019 04:57), Max: 36.9 (08 Oct 2019 21:40)  T(F): 97.7 (09 Oct 2019 04:57), Max: 98.4 (08 Oct 2019 21:40)  HR: 92 (09 Oct 2019 05:34) (90 - 100)  BP: 104/54 (09 Oct 2019 05:34) (101/57 - 131/80)  BP(mean): 78 (08 Oct 2019 22:44) (78 - 78)  RR: 17 (09 Oct 2019 04:57) (16 - 20)  SpO2: 94% (09 Oct 2019 04:57) (94% - 100%)  Daily     Daily Weight in k (09 Oct 2019 08:53)    General:  Appears stated age, well-groomed, nad  HEENT:  NC/AT,  conjunctivae clear and pink, no thyromegaly, nodules, adenopathy, no JVD  Chest:  Full & symmetric excursion, no increased effort, breath sounds clear  Cardiovascular:  Regular rhythm, S1, S2, no murmur/rub/S3/S4, no abdominal bruit, no edema  Abdomen:  Soft, non-tender, non-distended, normoactive bowel sounds,  no masses ,no hepatosplenomeagaly, no signs of chronic liver disease  Extremities:  no cyanosis,clubbing or edema  Skin:  No rash/erythema/ecchymoses/petechiae/wounds/abscess/warm/dry  Neuro/Psych:  A&O  , no asterixis, no tremor, no encephalopathy    Laboratory:                            7.8    3.58  )-----------( x        ( 09 Oct 2019 09:13 )             23.1     10-09    140  |  105  |  15  ----------------------------<  106<H>  3.6   |  26  |  1.20    Ca    8.1<L>      09 Oct 2019 09:13  Mg     1.7     10-09    TPro  5.5<L>  /  Alb  2.0<L>  /  TBili  4.3<H>  /  DBili  3.00<H>  /  AST  44<H>  /  ALT  27  /  AlkPhos  184<H>  10-09    LIVER FUNCTIONS - ( 09 Oct 2019 09:13 )  Alb: 2.0 g/dL / Pro: 5.5 g/dL / ALK PHOS: 184 U/L / ALT: 27 U/L / AST: 44 U/L / GGT: x           PT/INR - ( 08 Oct 2019 18:21 )   PT: 20.7 sec;   INR: 1.78 ratio         PTT - ( 08 Oct 2019 18:21 )  PTT:35.2 sec  Urinalysis Basic - ( 09 Oct 2019 03:34 )    Color: Yellow / Appearance: Clear / S.020 / pH: x  Gluc: x / Ketone: Negative  / Bili: Small / Urobili: 1   Blood: x / Protein: Negative / Nitrite: Negative   Leuk Esterase: Trace / RBC: 0-2 /HPF / WBC 6-10   Sq Epi: x / Non Sq Epi: Occasional / Bacteria: Occasional      Amylase Serum31      Lipase [id number]       [id number]  Amylase Serum--      Lipase serum--       [id number]  Amylase Serum35      Lipase [id number]       Ammonia--    Imaging:    < from: CT Abdomen and Pelvis No Cont (10.08.19 @ 19:23) >    EXAM:  CT ABDOMEN AND PELVIS                            PROCEDURE DATE:  10/08/2019          INTERPRETATION:  CLINICAL INFORMATION: Anasarca, weight gain.     COMPARISON: CT abdomen pelvis 4/3/2019.    PROCEDURE:   CT of the Abdomen and Pelvis was performed without intravenous contrast.   Intravenous contrast: None.  Oral contrast: None.  Sagittal and coronal reformats were performed.    FINDINGS:    LOWER CHEST: There are no pleural or pericardial effusions. The heart   size is normal. There is aortic valvular and coronary artery   calcification. The imaged lung bases demonstrate posterior dependent   atelectasis.    LIVER: The liver is heterogeneous with a prominent left lobe and   suggestion of contour nodularity, suspicious for cirrhosis.  BILE DUCTS: Normal caliber.  GALLBLADDER: Status postcholecystectomy.  SPLEEN: Within normal limits.  PANCREAS: Within normal limits.  ADRENALS: Within normal limits.  KIDNEYS/URETERS: No hydronephrosis. There is a 5 mm nonobstructing   calculus at the lower pole of the left kidney.    BLADDER: Within normal limits.  REPRODUCTIVE ORGANS: Prostate within normal limits.    BOWEL: No bowel obstruction. Appendix is within normal limits.  PERITONEUM: Small abdominopelvic ascites.  VESSELS: The aorta and IVC are normal in caliber. There is   atherosclerosis of the aorta. There are tortuous collateral vessels again   noted in the left periaortic region, likely a splenorenal shunt.  RETROPERITONEUM/LYMPH NODES: No lymphadenopathy.    ABDOMINAL WALL: Diffuse soft tissue anasarca.  BONES: Degenerative change of the thoracic spine.    IMPRESSION:     Compared to 4/3/2019:    Interval development of moderate abdominal pelvic ascites.  Changes in the liver suspicious for cirrhosis.  Tortuous collateral vessels in the left abdomen.  5 mm nonobstructing calculus in the left kidney.  Other incidental findings are as above.                    ANUSHA PIERSON M.D. ATTENDING RADIOLOGIST  This document has been electronically signed. Oct  8 2019  7:47PM                < end of copied text > Chief Complaint:  Patient is a 63y old  Male who presents with a chief complaint of abd dt    GIB (gastrointestinal bleeding)  GERD with esophagitis  Hepatic encephalopathy  Obesity  Fatty liver disease, nonalcoholic  Renal stones  Hypertension  Neuropathy  Hypercholesteremia  Diabetes  S/P cholecystectomy  No significant past surgical history     HPI: 63 y male presents with states for 2 months has gained "a lot of weight", in his abdomen and bilateral le, abdomen is distended, bilateral le swelling, pitting edema,  states he has hx of non alcoholic liver cirrhosis,  seen by his PMD Dr Satish Coto today sent for admission.  states seen by his urologist on Thursday last week,  cannot recall his name.  GI Dr Morales.  nonsmoker, denies etoh.  denies chest pain, sob, fever, nv, diarrhea.  states he has been constipated for 3 days, and has decreased urine output.    gi consulted for ascites. chart reviewed, well known to service, seen inpt 2019 for he, suspected 2/2 med non compliance. last seen in op office  for hospital f/u; pt seen and examined. states came to hospital for worsening abd dt, leg swelling and wt gain despite dec po. states since august has gained ~60#, improved diet, and still gaining wt. states saw uro last wk for seeing ?blood in urine; pt states was told no hematuria. saw pmd yesterday, and sent to ed, had labs done op as well. c/o associated intermittent chills, fatigue/weakness, abd bloating and constipation. states last bm  and darker in color. reports compliance w lactulose. +flatus. denies fevers, n/v/hematemesis, actual abd pain, diarrhea, hematochezia. egd/colon as below. abd sx as above.     recent  vs/labs/imaging reviewed- avss  pancytopenic  inr 1.78  ammonia 55  elev lfts  ct a/p as below  last mri  2019- Impression: Cirrhosis with prominent portosystemic varices suggesting portal hypertension. no obvious liver lesions. Trace ascites.    last egd 2019- severe esophagitis, gastritis, duodenal ulceration and hh; no ev  last colon 2018- 2 polyps, small hemorrhoids   afp wnl 10/2018    codeine (Anaphylaxis)      dextrose 40% Gel 15 Gram(s) Oral once PRN  dextrose 5%. 1000 milliLiter(s) IV Continuous <Continuous>  dextrose 50% Injectable 12.5 Gram(s) IV Push once  dextrose 50% Injectable 25 Gram(s) IV Push once  dextrose 50% Injectable 25 Gram(s) IV Push once  furosemide   Injectable 20 milliGRAM(s) IV Push every 12 hours  gabapentin 200 milliGRAM(s) Oral four times a day  glucagon  Injectable 1 milliGRAM(s) IntraMuscular once PRN  insulin glargine Injectable (LANTUS) 45 Unit(s) SubCutaneous at bedtime  insulin lispro (HumaLOG) corrective regimen sliding scale   SubCutaneous three times a day before meals  insulin lispro (HumaLOG) corrective regimen sliding scale   SubCutaneous at bedtime  lactulose Syrup 20 Gram(s) Oral four times a day  pantoprazole    Tablet 40 milliGRAM(s) Oral two times a day  rifaximin 550 milliGRAM(s) Oral two times a day  spironolactone 25 milliGRAM(s) Oral daily  ursodiol Capsule 300 milliGRAM(s) Oral three times a day        FAMILY HISTORY:  Family history of type 2 diabetes mellitus  Family history of hypertension  Family history of stomach cancer        Review of Systems:    General: wt gain weak  Eyes:  Good vision, no reported pain  ENT:  No sore throat, pain, runny nose, dysphagia  CV:  No pain, palpitations, no lightheadedness  Resp:  No dyspnea, cough, tachypnea, wheezing  GI: see above  :  No pain, bleeding, incontinence, nocturia  Muscle:  No pain, weakness  Neuro:  No weakness, tingling, memory problems  Psych:  No fatigue, insomnia, mood problems, depression  Endocrine:  No polyuria, polydypsia, cold/heat intolerance  Heme:  No petechiae, ecchymosis, easy bruisability  Skin:  No rash, tattoos, scars, edema    Relevant Family History:   n/c    Relevant Social History: n/c      Physical Exam:    Vital Signs:  Vital Signs Last 24 Hrs  T(C): 36.5 (09 Oct 2019 04:57), Max: 36.9 (08 Oct 2019 21:40)  T(F): 97.7 (09 Oct 2019 04:57), Max: 98.4 (08 Oct 2019 21:40)  HR: 92 (09 Oct 2019 05:34) (90 - 100)  BP: 104/54 (09 Oct 2019 05:34) (101/57 - 131/80)  BP(mean): 78 (08 Oct 2019 22:44) (78 - 78)  RR: 17 (09 Oct 2019 04:57) (16 - 20)  SpO2: 94% (09 Oct 2019 04:57) (94% - 100%)  Daily     Daily Weight in k (09 Oct 2019 08:53)    General:  nad lying in bed  HEENT:  NC/AT,  scleral icterus  Chest:  dec bs  Cardiovascular:  Regular rhythm, S1, S2  Abdomen:  Soft, nt +dt  Extremities:  +pitting edema  Skin:  No rash  Neuro/Psych:  awake alert appropriate    Laboratory:                            7.8    3.58  )-----------( x        ( 09 Oct 2019 09:13 )             23.1     10    140  |  105  |  15  ----------------------------<  106<H>  3.6   |  26  |  1.20    Ca    8.1<L>      09 Oct 2019 09:13  Mg     1.7     10-09    TPro  5.5<L>  /  Alb  2.0<L>  /  TBili  4.3<H>  /  DBili  3.00<H>  /  AST  44<H>  /  ALT  27  /  AlkPhos  184<H>  10    LIVER FUNCTIONS - ( 09 Oct 2019 09:13 )  Alb: 2.0 g/dL / Pro: 5.5 g/dL / ALK PHOS: 184 U/L / ALT: 27 U/L / AST: 44 U/L / GGT: x           PT/INR - ( 08 Oct 2019 18:21 )   PT: 20.7 sec;   INR: 1.78 ratio         PTT - ( 08 Oct 2019 18:21 )  PTT:35.2 sec  Urinalysis Basic - ( 09 Oct 2019 03:34 )    Color: Yellow / Appearance: Clear / S.020 / pH: x  Gluc: x / Ketone: Negative  / Bili: Small / Urobili: 1   Blood: x / Protein: Negative / Nitrite: Negative   Leuk Esterase: Trace / RBC: 0-2 /HPF / WBC 6-10   Sq Epi: x / Non Sq Epi: Occasional / Bacteria: Occasional      Amylase Serum31      Lipase [id number]       [id number]  Amylase Serum--      Lipase serum--       [id number]  Amylase Serum35      Lipase [id number]       Ammonia--    Imaging:    < from: CT Abdomen and Pelvis No Cont (10.08.19 @ 19:23) >    EXAM:  CT ABDOMEN AND PELVIS                            PROCEDURE DATE:  10/08/2019          INTERPRETATION:  CLINICAL INFORMATION: Anasarca, weight gain.     COMPARISON: CT abdomen pelvis 4/3/2019.    PROCEDURE:   CT of the Abdomen and Pelvis was performed without intravenous contrast.   Intravenous contrast: None.  Oral contrast: None.  Sagittal and coronal reformats were performed.    FINDINGS:    LOWER CHEST: There are no pleural or pericardial effusions. The heart   size is normal. There is aortic valvular and coronary artery   calcification. The imaged lung bases demonstrate posterior dependent   atelectasis.    LIVER: The liver is heterogeneous with a prominent left lobe and   suggestion of contour nodularity, suspicious for cirrhosis.  BILE DUCTS: Normal caliber.  GALLBLADDER: Status postcholecystectomy.  SPLEEN: Within normal limits.  PANCREAS: Within normal limits.  ADRENALS: Within normal limits.  KIDNEYS/URETERS: No hydronephrosis. There is a 5 mm nonobstructing   calculus at the lower pole of the left kidney.    BLADDER: Within normal limits.  REPRODUCTIVE ORGANS: Prostate within normal limits.    BOWEL: No bowel obstruction. Appendix is within normal limits.  PERITONEUM: Small abdominopelvic ascites.  VESSELS: The aorta and IVC are normal in caliber. There is   atherosclerosis of the aorta. There are tortuous collateral vessels again   noted in the left periaortic region, likely a splenorenal shunt.  RETROPERITONEUM/LYMPH NODES: No lymphadenopathy.    ABDOMINAL WALL: Diffuse soft tissue anasarca.  BONES: Degenerative change of the thoracic spine.    IMPRESSION:     Compared to 4/3/2019:    Interval development of moderate abdominal pelvic ascites.  Changes in the liver suspicious for cirrhosis.  Tortuous collateral vessels in the left abdomen.  5 mm nonobstructing calculus in the left kidney.  Other incidental findings are as above.                    ANUSHA PIERSON M.D. ATTENDING RADIOLOGIST  This document has been electronically signed. Oct  8 2019  7:47PM                < end of copied text > Chief Complaint:  Patient is a 63y old  Male who presents with a chief complaint of abd dt    GIB (gastrointestinal bleeding)  GERD with esophagitis  Hepatic encephalopathy  Obesity  Fatty liver disease, nonalcoholic  Renal stones  Hypertension  Neuropathy  Hypercholesteremia  Diabetes  S/P cholecystectomy  No significant past surgical history     HPI: 63 y male presents with states for 2 months has gained "a lot of weight", in his abdomen and bilateral le, abdomen is distended, bilateral le swelling, pitting edema,  states he has hx of non alcoholic liver cirrhosis,  seen by his PMD Dr Satish Coto today sent for admission.  states seen by his urologist on Thursday last week,  cannot recall his name.  GI Dr Morales.  nonsmoker, denies etoh.  denies chest pain, sob, fever, nv, diarrhea.  states he has been constipated for 3 days, and has decreased urine output.    gi consulted for ascites. chart reviewed, well known to service, seen inpt 2019 for he, suspected 2/2 med non compliance. last seen in op office  for hospital f/u; pt seen and examined. states came to hospital for worsening abd dt, leg swelling and wt gain despite dec po. states since august has gained ~60#, improved diet, and still gaining wt. states saw uro last wk for seeing ?blood in urine; pt states was told no hematuria. saw pmd yesterday, and sent to ed, had labs done op as well. c/o associated intermittent chills, fatigue/weakness, abd bloating and constipation. states last bm  and darker in color. reports compliance w lactulose. +flatus. denies fevers, n/v/hematemesis, actual abd pain, diarrhea, hematochezia. egd/colon as below. abd sx as above.     recent  vs/labs/imaging reviewed- avss  pancytopenic  inr 1.78  ammonia 55  elev lfts  ct a/p as below  last mri  2019- Impression: Cirrhosis with prominent portosystemic varices suggesting portal hypertension. no obvious liver lesions. Trace ascites.    last egd 2019- severe esophagitis, gastritis, duodenal ulceration and hh; no ev  last colon 2018- 2 polyps, small hemorrhoids   afp wnl 10/2018  had eus 2019 for further eval of duodenal mass/polyp found on prior egd- no gross lesions in esophagus or stomach, one du w no stigmata of recent bleeding; no sign of significant pathology in examined duodenum; cystic lesion in pancreatic body    codeine (Anaphylaxis)      dextrose 40% Gel 15 Gram(s) Oral once PRN  dextrose 5%. 1000 milliLiter(s) IV Continuous <Continuous>  dextrose 50% Injectable 12.5 Gram(s) IV Push once  dextrose 50% Injectable 25 Gram(s) IV Push once  dextrose 50% Injectable 25 Gram(s) IV Push once  furosemide   Injectable 20 milliGRAM(s) IV Push every 12 hours  gabapentin 200 milliGRAM(s) Oral four times a day  glucagon  Injectable 1 milliGRAM(s) IntraMuscular once PRN  insulin glargine Injectable (LANTUS) 45 Unit(s) SubCutaneous at bedtime  insulin lispro (HumaLOG) corrective regimen sliding scale   SubCutaneous three times a day before meals  insulin lispro (HumaLOG) corrective regimen sliding scale   SubCutaneous at bedtime  lactulose Syrup 20 Gram(s) Oral four times a day  pantoprazole    Tablet 40 milliGRAM(s) Oral two times a day  rifaximin 550 milliGRAM(s) Oral two times a day  spironolactone 25 milliGRAM(s) Oral daily  ursodiol Capsule 300 milliGRAM(s) Oral three times a day        FAMILY HISTORY:  Family history of type 2 diabetes mellitus  Family history of hypertension  Family history of stomach cancer        Review of Systems:    General: wt gain weak  Eyes:  Good vision, no reported pain  ENT:  No sore throat, pain, runny nose, dysphagia  CV:  No pain, palpitations, no lightheadedness  Resp:  No dyspnea, cough, tachypnea, wheezing  GI: see above  :  No pain, bleeding, incontinence, nocturia  Muscle:  No pain, weakness  Neuro:  No weakness, tingling, memory problems  Psych:  No fatigue, insomnia, mood problems, depression  Endocrine:  No polyuria, polydypsia, cold/heat intolerance  Heme:  No petechiae, ecchymosis, easy bruisability  Skin:  No rash, tattoos, scars, edema    Relevant Family History:   n/c    Relevant Social History: n/c      Physical Exam:    Vital Signs:  Vital Signs Last 24 Hrs  T(C): 36.5 (09 Oct 2019 04:57), Max: 36.9 (08 Oct 2019 21:40)  T(F): 97.7 (09 Oct 2019 04:57), Max: 98.4 (08 Oct 2019 21:40)  HR: 92 (09 Oct 2019 05:34) (90 - 100)  BP: 104/54 (09 Oct 2019 05:34) (101/57 - 131/80)  BP(mean): 78 (08 Oct 2019 22:44) (78 - 78)  RR: 17 (09 Oct 2019 04:57) (16 - 20)  SpO2: 94% (09 Oct 2019 04:57) (94% - 100%)  Daily     Daily Weight in k (09 Oct 2019 08:53)    General:  nad lying in bed  HEENT:  NC/AT,  scleral icterus  Chest:  dec bs  Cardiovascular:  Regular rhythm, S1, S2  Abdomen:  Soft, nt +dt  Extremities:  +pitting edema  Skin:  No rash  Neuro/Psych:  awake alert appropriate    Laboratory:                            7.8    3.58  )-----------( x        ( 09 Oct 2019 09:13 )             23.1     10    140  |  105  |  15  ----------------------------<  106<H>  3.6   |  26  |  1.20    Ca    8.1<L>      09 Oct 2019 09:13  Mg     1.7     10    TPro  5.5<L>  /  Alb  2.0<L>  /  TBili  4.3<H>  /  DBili  3.00<H>  /  AST  44<H>  /  ALT  27  /  AlkPhos  184<H>  10    LIVER FUNCTIONS - ( 09 Oct 2019 09:13 )  Alb: 2.0 g/dL / Pro: 5.5 g/dL / ALK PHOS: 184 U/L / ALT: 27 U/L / AST: 44 U/L / GGT: x           PT/INR - ( 08 Oct 2019 18:21 )   PT: 20.7 sec;   INR: 1.78 ratio         PTT - ( 08 Oct 2019 18:21 )  PTT:35.2 sec  Urinalysis Basic - ( 09 Oct 2019 03:34 )    Color: Yellow / Appearance: Clear / S.020 / pH: x  Gluc: x / Ketone: Negative  / Bili: Small / Urobili: 1   Blood: x / Protein: Negative / Nitrite: Negative   Leuk Esterase: Trace / RBC: 0-2 /HPF / WBC 6-10   Sq Epi: x / Non Sq Epi: Occasional / Bacteria: Occasional      Amylase Serum31      Lipase [id number]       [id number]  Amylase Serum--      Lipase serum--       [id number]  Amylase Serum35      Lipase [id number]       Ammonia--    Imaging:    < from: CT Abdomen and Pelvis No Cont (10.08.19 @ 19:23) >    EXAM:  CT ABDOMEN AND PELVIS                            PROCEDURE DATE:  10/08/2019          INTERPRETATION:  CLINICAL INFORMATION: Anasarca, weight gain.     COMPARISON: CT abdomen pelvis 4/3/2019.    PROCEDURE:   CT of the Abdomen and Pelvis was performed without intravenous contrast.   Intravenous contrast: None.  Oral contrast: None.  Sagittal and coronal reformats were performed.    FINDINGS:    LOWER CHEST: There are no pleural or pericardial effusions. The heart   size is normal. There is aortic valvular and coronary artery   calcification. The imaged lung bases demonstrate posterior dependent   atelectasis.    LIVER: The liver is heterogeneous with a prominent left lobe and   suggestion of contour nodularity, suspicious for cirrhosis.  BILE DUCTS: Normal caliber.  GALLBLADDER: Status postcholecystectomy.  SPLEEN: Within normal limits.  PANCREAS: Within normal limits.  ADRENALS: Within normal limits.  KIDNEYS/URETERS: No hydronephrosis. There is a 5 mm nonobstructing   calculus at the lower pole of the left kidney.    BLADDER: Within normal limits.  REPRODUCTIVE ORGANS: Prostate within normal limits.    BOWEL: No bowel obstruction. Appendix is within normal limits.  PERITONEUM: Small abdominopelvic ascites.  VESSELS: The aorta and IVC are normal in caliber. There is   atherosclerosis of the aorta. There are tortuous collateral vessels again   noted in the left periaortic region, likely a splenorenal shunt.  RETROPERITONEUM/LYMPH NODES: No lymphadenopathy.    ABDOMINAL WALL: Diffuse soft tissue anasarca.  BONES: Degenerative change of the thoracic spine.    IMPRESSION:     Compared to 4/3/2019:    Interval development of moderate abdominal pelvic ascites.  Changes in the liver suspicious for cirrhosis.  Tortuous collateral vessels in the left abdomen.  5 mm nonobstructing calculus in the left kidney.  Other incidental findings are as above.                    ANUSHA PIERSON M.D. ATTENDING RADIOLOGIST  This document has been electronically signed. Oct  8 2019  7:47PM                < end of copied text >

## 2019-10-09 NOTE — H&P ADULT - PROBLEM SELECTOR PLAN 3
Multifactorial  Check iron studies, b12, folate, reticulocyte count, haptoglobin and ldh  Check stool OB  Monitor h/h  Transfuse prn  Continue PPI

## 2019-10-09 NOTE — DIETITIAN INITIAL EVALUATION ADULT. - ENERGY INTAKE
Pt consumed 100% bfst meal. Pt states he doesn't eat that much anymore. "If you ask his kids they'd say he eats like a bird"

## 2019-10-09 NOTE — DIETITIAN INITIAL EVALUATION ADULT. - OTHER INFO
62 yo male with PMH of DM, GIB, GERD, hepatic encephalopathy, NAFLD, Htn, high cholesterol, cholecystectomy presents with generalized edema/ascites.  Pt tolerated 100% of bfst. Reports no BM since Sunday (3 days ago). Takes Lactulose at home.   RDN note from last admission in April reviewed.  Pt weighed 264/287# then.  Pt reports wt hx of ~360# last December at which time he decided to lose weight. Pt was extensively educated by RDN who he states really helped him. Pt states weight was around 292# in August and since then he has gained ~50# which he attributes to fluid.  Pt states when he noticed the wt gain he started eating even less because he didn't want to gain wt and when he noticed weight still increasing decided to seek medical attention.  Pt states he eats normally only 2 meals a day (not really a bfst eater) and lunch is usually homemade soup and salad.  He stated he doesn't cook with any salt and only adds a tiny bit if it needs it for flavor.  Stated he eats a lot of vegetables, doesn't really snack of typical snack foods.

## 2019-10-09 NOTE — H&P ADULT - PROBLEM SELECTOR PLAN 4
Patient has been hypoglycemic  Decrease lantus  RISS  Endocrine consult Patient has been hypoglycemic  Decrease lantus to 40 qam  RISS  Endocrine consult

## 2019-10-09 NOTE — H&P ADULT - PROBLEM SELECTOR PLAN 1
Secondary to anasarca  Admit  As per IR, there is not a significant amount of ascites to drain  Had diagnostic paracentesis  Lasix 40mg ivp q12h  Add spironolactone 25mg qd  Daily weights  GI consult

## 2019-10-09 NOTE — DIETITIAN INITIAL EVALUATION ADULT. - PHYSICAL APPEARANCE
obese/BMI 48 with current wt 356# however pt is edematous/other (specify) abdomen distended, legs edematous\  no pressure injuries

## 2019-10-09 NOTE — CONSULT NOTE ADULT - PROBLEM SELECTOR RECOMMENDATION 2
2/2 JEAN; now c/b ascites  sp dx tap; meld ~20 on admission  last mri 4/2019 no obvious liver lesions; check afp  last egd 4/2019- severe esophagitis, gastritis, duodenal ulceration and hh; no EV  f/u doppler US  cont ppi bid  cont sandra tid  cont rifaximin bid  cont lactulose qid, titrate to 4 soft bms/day  cont diuretics, monitor renal fxn  trend lfts; avoid hepatotoxins

## 2019-10-10 ENCOUNTER — TRANSCRIPTION ENCOUNTER (OUTPATIENT)
Age: 64
End: 2019-10-10

## 2019-10-10 LAB
AFP-TM SERPL-MCNC: 2.3 NG/ML — SIGNIFICANT CHANGE UP
ALBUMIN SERPL ELPH-MCNC: 1.9 G/DL — LOW (ref 3.3–5)
ALP SERPL-CCNC: 175 U/L — HIGH (ref 40–120)
ALT FLD-CCNC: 24 U/L — SIGNIFICANT CHANGE UP (ref 12–78)
AMMONIA BLD-MCNC: 57 UMOL/L — HIGH (ref 11–32)
ANION GAP SERPL CALC-SCNC: 7 MMOL/L — SIGNIFICANT CHANGE UP (ref 5–17)
AST SERPL-CCNC: 36 U/L — SIGNIFICANT CHANGE UP (ref 15–37)
BILIRUB DIRECT SERPL-MCNC: 2.8 MG/DL — HIGH (ref 0.05–0.2)
BILIRUB INDIRECT FLD-MCNC: 1.3 MG/DL — HIGH (ref 0.2–1)
BILIRUB SERPL-MCNC: 4.1 MG/DL — HIGH (ref 0.2–1.2)
BLD GP AB SCN SERPL QL: SIGNIFICANT CHANGE UP
BUN SERPL-MCNC: 16 MG/DL — SIGNIFICANT CHANGE UP (ref 7–23)
CALCIUM SERPL-MCNC: 8.1 MG/DL — LOW (ref 8.5–10.1)
CHLORIDE SERPL-SCNC: 106 MMOL/L — SIGNIFICANT CHANGE UP (ref 96–108)
CO2 SERPL-SCNC: 28 MMOL/L — SIGNIFICANT CHANGE UP (ref 22–31)
CREAT SERPL-MCNC: 1.4 MG/DL — HIGH (ref 0.5–1.3)
GLUCOSE SERPL-MCNC: 130 MG/DL — HIGH (ref 70–99)
HAPTOGLOB SERPL-MCNC: <20 MG/DL — LOW (ref 34–200)
HCT VFR BLD CALC: 21.5 % — LOW (ref 39–50)
HGB BLD-MCNC: 7.1 G/DL — LOW (ref 13–17)
LDH SERPL L TO P-CCNC: 298 U/L — HIGH (ref 50–242)
MCHC RBC-ENTMCNC: 33 GM/DL — SIGNIFICANT CHANGE UP (ref 32–36)
MCHC RBC-ENTMCNC: 33.6 PG — SIGNIFICANT CHANGE UP (ref 27–34)
MCV RBC AUTO: 101.9 FL — HIGH (ref 80–100)
NIGHT BLUE STAIN TISS: SIGNIFICANT CHANGE UP
NRBC # BLD: 0 /100 WBCS — SIGNIFICANT CHANGE UP (ref 0–0)
OB PNL STL: POSITIVE
PLATELET # BLD AUTO: 92 K/UL — LOW (ref 150–400)
POTASSIUM SERPL-MCNC: 3.4 MMOL/L — LOW (ref 3.5–5.3)
POTASSIUM SERPL-SCNC: 3.4 MMOL/L — LOW (ref 3.5–5.3)
PROT SERPL-MCNC: 5.2 G/DL — LOW (ref 6–8.3)
RBC # BLD: 2.11 M/UL — LOW (ref 4.2–5.8)
RBC # FLD: 16.5 % — HIGH (ref 10.3–14.5)
SODIUM SERPL-SCNC: 141 MMOL/L — SIGNIFICANT CHANGE UP (ref 135–145)
SPECIMEN SOURCE: SIGNIFICANT CHANGE UP
WBC # BLD: 3.74 K/UL — LOW (ref 3.8–10.5)
WBC # FLD AUTO: 3.74 K/UL — LOW (ref 3.8–10.5)

## 2019-10-10 PROCEDURE — 93975 VASCULAR STUDY: CPT | Mod: 26

## 2019-10-10 PROCEDURE — 99223 1ST HOSP IP/OBS HIGH 75: CPT

## 2019-10-10 RX ORDER — POLYETHYLENE GLYCOL 3350 17 G/17G
17 POWDER, FOR SOLUTION ORAL
Refills: 0 | Status: DISCONTINUED | OUTPATIENT
Start: 2019-10-10 | End: 2019-10-12

## 2019-10-10 RX ORDER — FUROSEMIDE 40 MG
20 TABLET ORAL ONCE
Refills: 0 | Status: COMPLETED | OUTPATIENT
Start: 2019-10-10 | End: 2020-09-07

## 2019-10-10 RX ORDER — DIPHENHYDRAMINE HCL 50 MG
25 CAPSULE ORAL ONCE
Refills: 0 | Status: COMPLETED | OUTPATIENT
Start: 2019-10-10 | End: 2019-10-10

## 2019-10-10 RX ORDER — SIMETHICONE 80 MG/1
80 TABLET, CHEWABLE ORAL THREE TIMES A DAY
Refills: 0 | Status: DISCONTINUED | OUTPATIENT
Start: 2019-10-10 | End: 2019-10-12

## 2019-10-10 RX ORDER — ACETAMINOPHEN 500 MG
650 TABLET ORAL ONCE
Refills: 0 | Status: COMPLETED | OUTPATIENT
Start: 2019-10-10

## 2019-10-10 RX ORDER — DIPHENHYDRAMINE HCL 50 MG
25 CAPSULE ORAL ONCE
Refills: 0 | Status: COMPLETED | OUTPATIENT
Start: 2019-10-10 | End: 2020-09-07

## 2019-10-10 RX ORDER — FUROSEMIDE 40 MG
20 TABLET ORAL ONCE
Refills: 0 | Status: COMPLETED | OUTPATIENT
Start: 2019-10-10 | End: 2019-10-10

## 2019-10-10 RX ORDER — LACTULOSE 10 G/15ML
45 SOLUTION ORAL
Refills: 0 | Status: DISCONTINUED | OUTPATIENT
Start: 2019-10-10 | End: 2019-10-12

## 2019-10-10 RX ADMIN — URSODIOL 300 MILLIGRAM(S): 250 TABLET, FILM COATED ORAL at 17:24

## 2019-10-10 RX ADMIN — LIDOCAINE 1 PATCH: 4 CREAM TOPICAL at 02:00

## 2019-10-10 RX ADMIN — LIDOCAINE 1 PATCH: 4 CREAM TOPICAL at 12:56

## 2019-10-10 RX ADMIN — SIMETHICONE 80 MILLIGRAM(S): 80 TABLET, CHEWABLE ORAL at 22:11

## 2019-10-10 RX ADMIN — Medication 20 MILLIGRAM(S): at 22:10

## 2019-10-10 RX ADMIN — Medication 1 GRAM(S): at 12:56

## 2019-10-10 RX ADMIN — PANTOPRAZOLE SODIUM 40 MILLIGRAM(S): 20 TABLET, DELAYED RELEASE ORAL at 17:24

## 2019-10-10 RX ADMIN — URSODIOL 300 MILLIGRAM(S): 250 TABLET, FILM COATED ORAL at 22:11

## 2019-10-10 RX ADMIN — Medication 1 GRAM(S): at 22:11

## 2019-10-10 RX ADMIN — GABAPENTIN 200 MILLIGRAM(S): 400 CAPSULE ORAL at 05:50

## 2019-10-10 RX ADMIN — SPIRONOLACTONE 25 MILLIGRAM(S): 25 TABLET, FILM COATED ORAL at 05:51

## 2019-10-10 RX ADMIN — PANTOPRAZOLE SODIUM 40 MILLIGRAM(S): 20 TABLET, DELAYED RELEASE ORAL at 05:51

## 2019-10-10 RX ADMIN — GABAPENTIN 200 MILLIGRAM(S): 400 CAPSULE ORAL at 17:30

## 2019-10-10 RX ADMIN — Medication 2 DROP(S): at 05:52

## 2019-10-10 RX ADMIN — LACTULOSE 45 GRAM(S): 10 SOLUTION ORAL at 17:23

## 2019-10-10 RX ADMIN — Medication 2 DROP(S): at 22:12

## 2019-10-10 RX ADMIN — GABAPENTIN 200 MILLIGRAM(S): 400 CAPSULE ORAL at 22:11

## 2019-10-10 RX ADMIN — LACTULOSE 45 GRAM(S): 10 SOLUTION ORAL at 22:10

## 2019-10-10 RX ADMIN — Medication 1000 UNIT(S): at 12:56

## 2019-10-10 RX ADMIN — POLYETHYLENE GLYCOL 3350 17 GRAM(S): 17 POWDER, FOR SOLUTION ORAL at 09:32

## 2019-10-10 RX ADMIN — Medication 2 DROP(S): at 17:32

## 2019-10-10 RX ADMIN — SIMETHICONE 80 MILLIGRAM(S): 80 TABLET, CHEWABLE ORAL at 17:24

## 2019-10-10 RX ADMIN — Medication 40 MILLIGRAM(S): at 18:52

## 2019-10-10 RX ADMIN — Medication 1 GRAM(S): at 05:51

## 2019-10-10 RX ADMIN — Medication 1 GRAM(S): at 17:24

## 2019-10-10 RX ADMIN — Medication 25 MILLIGRAM(S): at 18:52

## 2019-10-10 RX ADMIN — GABAPENTIN 200 MILLIGRAM(S): 400 CAPSULE ORAL at 12:55

## 2019-10-10 RX ADMIN — INSULIN GLARGINE 40 UNIT(S): 100 INJECTION, SOLUTION SUBCUTANEOUS at 09:32

## 2019-10-10 RX ADMIN — LIDOCAINE 1 PATCH: 4 CREAM TOPICAL at 20:02

## 2019-10-10 RX ADMIN — LACTULOSE 30 GRAM(S): 10 SOLUTION ORAL at 05:51

## 2019-10-10 RX ADMIN — URSODIOL 300 MILLIGRAM(S): 250 TABLET, FILM COATED ORAL at 05:50

## 2019-10-10 RX ADMIN — LACTULOSE 30 GRAM(S): 10 SOLUTION ORAL at 12:56

## 2019-10-10 NOTE — PROGRESS NOTE ADULT - SUBJECTIVE AND OBJECTIVE BOX
Patient is a 63y old  Male who presents with a chief complaint of increased weight/water retention (10 Oct 2019 11:09)      INTERVAL HPI/OVERNIGHT EVENTS: Patient seen and examined. NAD. No complaints.    Vital Signs Last 24 Hrs  T(C): 36.6 (10 Oct 2019 13:32), Max: 36.9 (09 Oct 2019 21:41)  T(F): 97.8 (10 Oct 2019 13:32), Max: 98.5 (09 Oct 2019 21:41)  HR: 87 (10 Oct 2019 13:32) (87 - 94)  BP: 129/78 (10 Oct 2019 13:32) (102/58 - 129/78)  BP(mean): 73 (09 Oct 2019 21:41) (73 - 73)  RR: 18 (10 Oct 2019 13:32) (14 - 18)  SpO2: 94% (10 Oct 2019 13:32) (90% - 96%)    10-10    141  |  106  |  16  ----------------------------<  130<H>  3.4<L>   |  28  |  1.40<H>    Ca    8.1<L>      10 Oct 2019 08:29  Mg     1.7     10-09    TPro  5.2<L>  /  Alb  1.9<L>  /  TBili  4.1<H>  /  DBili  2.80<H>  /  AST  36  /  ALT  24  /  AlkPhos  175<H>  10-10                          7.1    3.74  )-----------( 92       ( 10 Oct 2019 08:29 )             21.5     PT/INR - ( 08 Oct 2019 18:21 )   PT: 20.7 sec;   INR: 1.78 ratio         PTT - ( 08 Oct 2019 18:21 )  PTT:35.2 sec  CAPILLARY BLOOD GLUCOSE      POCT Blood Glucose.: 132 mg/dL (10 Oct 2019 11:44)  POCT Blood Glucose.: 133 mg/dL (10 Oct 2019 07:34)  POCT Blood Glucose.: 141 mg/dL (09 Oct 2019 21:38)  POCT Blood Glucose.: 134 mg/dL (09 Oct 2019 16:54)    Urinalysis Basic - ( 09 Oct 2019 03:34 )    Color: Yellow / Appearance: Clear / S.020 / pH: x  Gluc: x / Ketone: Negative  / Bili: Small / Urobili: 1   Blood: x / Protein: Negative / Nitrite: Negative   Leuk Esterase: Trace / RBC: 0-2 /HPF / WBC 6-10   Sq Epi: x / Non Sq Epi: Occasional / Bacteria: Occasional              cholecalciferol 1000 Unit(s) Oral daily  dextrose 40% Gel 15 Gram(s) Oral once PRN  dextrose 5%. 1000 milliLiter(s) IV Continuous <Continuous>  dextrose 50% Injectable 12.5 Gram(s) IV Push once  dextrose 50% Injectable 25 Gram(s) IV Push once  dextrose 50% Injectable 25 Gram(s) IV Push once  furosemide   Injectable 40 milliGRAM(s) IV Push every 12 hours  gabapentin 200 milliGRAM(s) Oral four times a day  glucagon  Injectable 1 milliGRAM(s) IntraMuscular once PRN  hydrocortisone/polymyxin/neomycin Solution 2 Drop(s) Right Ear every 8 hours  insulin glargine Injectable (LANTUS) 40 Unit(s) SubCutaneous every morning  insulin lispro (HumaLOG) corrective regimen sliding scale   SubCutaneous three times a day before meals  insulin lispro (HumaLOG) corrective regimen sliding scale   SubCutaneous at bedtime  lactulose Syrup 45 Gram(s) Oral four times a day  lidocaine   Patch 1 Patch Transdermal daily  pantoprazole    Tablet 40 milliGRAM(s) Oral two times a day  polyethylene glycol 3350 17 Gram(s) Oral two times a day  rifaximin 550 milliGRAM(s) Oral two times a day  simethicone 80 milliGRAM(s) Chew three times a day  spironolactone 25 milliGRAM(s) Oral daily  sucralfate 1 Gram(s) Oral every 6 hours  ursodiol Capsule 300 milliGRAM(s) Oral three times a day              REVIEW OF SYSTEMS:  CONSTITUTIONAL: No fever, no weight loss, or no fatigue  NECK: No pain, no stiffness  RESPIRATORY: No cough, no wheezing, no chills, no hemoptysis, No shortness of breath  CARDIOVASCULAR: No chest pain, no palpitations, no dizziness, no leg swelling  GASTROINTESTINAL: No abdominal pain. No nausea, no vomiting, no hematemesis; No diarrhea, no constipation. No melena, no hematochezia.  GENITOURINARY: No dysuria, no frequency, no hematuria, no incontinence  NEUROLOGICAL: No headaches, no loss of strength, no numbness, no tremors  SKIN: No itching, no burning  MUSCULOSKELETAL: No joint pain, no swelling; No muscle, no back, no extremity pain  PSYCHIATRIC: No depression, no mood swings,   HEME/LYMPH: No easy bruising, no bleeding gums  ALLERY AND IMMUNOLOGIC: No hives       Consultant(s) Notes Reviewed:  [X] YES  [ ] NO    PHYSICAL EXAM:  GENERAL: NAD  HEAD:  Atraumatic, Normocephalic  EYES: EOMI, PERRLA, conjunctiva and sclera clear  ENMT: No tonsillar erythema, exudates, or enlargement; Moist mucous membranes  NECK: Supple, No JVD  NERVOUS SYSTEM:  Awake & alert  CHEST/LUNG: Clear to auscultation bilaterally; No rales, rhonchi, wheezing,  HEART: Regular rate and rhythm  ABDOMEN: Soft, Nontender, +distended; Bowel sounds present  EXTREMITIES:  No clubbing, cyanosis, + edema  LYMPH: No lymphadenopathy noted  SKIN: No rashes      Advanced care planning discussed with patient/family [X] YES   [ ] NO    Advanced care planning discussed with patient/family. Advanced care planning forms reviewed/discussed/completed. 20 minutes spent.

## 2019-10-10 NOTE — CONSULT NOTE ADULT - PROBLEM SELECTOR RECOMMENDATION 9
cont lantus 40 units qam  cont humalog scale coverage qac/qhs  cont cons cho diet  goal bg 100-180 in hosp setting
worsening abd dt/edema in setting of cirrhosis  ct showing small-mod ascites and diffuse soft tissue anasarca of abd wall; no bowel obstruction  sp dx para; turbid yellow fluid drained  will check doppler us to r/o pvt  f/u fluid analysis  cont diuresis  monitor exam  low na/low pro diet

## 2019-10-10 NOTE — CONSULT NOTE ADULT - SUBJECTIVE AND OBJECTIVE BOX
[facility] - Sushila Dhaliwal, Doug, Morenita, Ayo, Colleen Williamson  Office Number: [phone]    Initial Consult Note    CHIEF COMPLAINT: Patient is a 63y old  Male who presents with a chief complaint of increased weight/water retention (10 Oct 2019 14:18)      HPI:  63 male with non-alcoholic cirrhosis, GERD, GIB, hepatic encephalopathy presents with states for 2 months has gained "a lot of weight" -- 50# in his abdomen and bilateral le, abdomen is distended, bilateral le swelling, pitting edema,  states he has hx of non alcoholic liver cirrhosis,  seen by his PMD Dr Satish Coto today sent for admission. States seen by his urologist on Thursday last week,  cannot recall his name.  GI Dr Morales.  nonsmoker, denies etoh.  denies chest pain, sob, fever, nv, diarrhea.  States he has been constipated for 3 days, and has decreased urine output. Dark stool. (09 Oct 2019 12:35)    He reports no prior cardiac problems. He denies shortness of breath and chest pain.  He reports significant lower extremity and abdominal edema, and is on diuretics. He gained >50 lbs.  Called for cardiac clearance prior to upper endoscopy.    PAST MEDICAL & SURGICAL HISTORY:  GIB (gastrointestinal bleeding)  GERD with esophagitis: Gastritis &amp; Non Bleeding Ulcers  Hepatic encephalopathy  Obesity  Fatty liver disease, nonalcoholic  Renal stones: 25 years ago  Hypertension  Neuropathy  Hypercholesteremia  Diabetes  S/P cholecystectomy      SOCIAL HISTORY:  No tobacco, no alcohol in last 3 years, no drug abuse.    FAMILY HISTORY:  Family history of type 2 diabetes mellitus  Family history of hypertension  Family history of stomach cancer    No family history of acute MI or sudden cardiac death.    MEDICATIONS  (STANDING):  acetaminophen   Tablet .. 650 milliGRAM(s) Oral once  cholecalciferol 1000 Unit(s) Oral daily  dextrose 5%. 1000 milliLiter(s) (50 mL/Hr) IV Continuous <Continuous>  dextrose 50% Injectable 12.5 Gram(s) IV Push once  dextrose 50% Injectable 25 Gram(s) IV Push once  dextrose 50% Injectable 25 Gram(s) IV Push once  diphenhydrAMINE 25 milliGRAM(s) Oral once  furosemide   Injectable 20 milliGRAM(s) IV Push once  furosemide   Injectable 40 milliGRAM(s) IV Push every 12 hours  gabapentin 200 milliGRAM(s) Oral four times a day  hydrocortisone/polymyxin/neomycin Solution 2 Drop(s) Right Ear every 8 hours  insulin glargine Injectable (LANTUS) 40 Unit(s) SubCutaneous every morning  insulin lispro (HumaLOG) corrective regimen sliding scale   SubCutaneous three times a day before meals  insulin lispro (HumaLOG) corrective regimen sliding scale   SubCutaneous at bedtime  lactulose Syrup 45 Gram(s) Oral four times a day  lidocaine   Patch 1 Patch Transdermal daily  pantoprazole    Tablet 40 milliGRAM(s) Oral two times a day  polyethylene glycol 3350 17 Gram(s) Oral two times a day  rifaximin 550 milliGRAM(s) Oral two times a day  simethicone 80 milliGRAM(s) Chew three times a day  spironolactone 25 milliGRAM(s) Oral daily  sucralfate 1 Gram(s) Oral every 6 hours  ursodiol Capsule 300 milliGRAM(s) Oral three times a day    MEDICATIONS  (PRN):  dextrose 40% Gel 15 Gram(s) Oral once PRN Blood Glucose LESS THAN 70 milliGRAM(s)/deciLiter  glucagon  Injectable 1 milliGRAM(s) IntraMuscular once PRN Glucose <70 milliGRAM(s)/deciLiter      Allergies    codeine (Anaphylaxis)    Intolerances        REVIEW OF SYSTEMS:    CONSTITUTIONAL: No weakness, fevers or chills  EYES/ENT: No visual changes;  No vertigo or throat pain   NECK: No pain or stiffness  RESPIRATORY: No cough, wheezing, hemoptysis; No shortness of breath  CARDIOVASCULAR: No chest pain or palpitations  GASTROINTESTINAL: No abdominal pain. No nausea, vomiting, or hematemesis; No diarrhea or constipation. No melena or hematochezia.  GENITOURINARY: No dysuria, frequency or hematuria  NEUROLOGICAL: No numbness or weakness  SKIN: No itching or rash  All other review of systems is negative unless indicated above    VITAL SIGNS:   Vital Signs Last 24 Hrs  T(C): 36.6 (10 Oct 2019 13:32), Max: 36.9 (09 Oct 2019 21:41)  T(F): 97.8 (10 Oct 2019 13:32), Max: 98.5 (09 Oct 2019 21:41)  HR: 87 (10 Oct 2019 13:32) (87 - 94)  BP: 129/78 (10 Oct 2019 13:32) (102/58 - 129/78)  BP(mean): 73 (09 Oct 2019 21:41) (73 - 73)  RR: 18 (10 Oct 2019 13:32) (14 - 18)  SpO2: 94% (10 Oct 2019 13:32) (90% - 96%)    I&O's Summary    09 Oct 2019 07:01  -  10 Oct 2019 07:00  --------------------------------------------------------  IN: 0 mL / OUT: 500 mL / NET: -500 mL    10 Oct 2019 07:01  -  10 Oct 2019 14:39  --------------------------------------------------------  IN: 240 mL / OUT: 0 mL / NET: 240 mL        On Exam:    Constitutional: NAD, alert and oriented x 3, obese  Lungs:  Non-labored, breath sounds are clear bilaterally, No wheezing, rales or rhonchi  Cardiovascular: RRR.  S1 and S2 positive.  No murmurs, rubs, gallops or clicks  Gastrointestinal: Bowel Sounds present, soft, nontender, +swollen abdomen with ascites   Lymph: 2-3+ to thigh. No cervical lymphadenopathy.  Neurological: Alert, no focal deficits  Skin: No rashes or ulcers   Psych:  Mood & affect appropriate.    LABS: All Labs Reviewed:                        7.1    3.74  )-----------( 92       ( 10 Oct 2019 08:29 )             21.5                         7.8    3.58  )-----------( 82       ( 09 Oct 2019 09:13 )             23.1                         8.2    4.33  )-----------( 100      ( 08 Oct 2019 18:21 )             24.5     10 Oct 2019 08:29    141    |  106    |  16     ----------------------------<  130    3.4     |  28     |  1.40   09 Oct 2019 09:13    140    |  105    |  15     ----------------------------<  106    3.6     |  26     |  1.20   08 Oct 2019 18:21    138    |  105    |  15     ----------------------------<  65     3.7     |  27     |  1.20     Ca    8.1        10 Oct 2019 08:29  Ca    8.1        09 Oct 2019 09:13  Ca    8.3        08 Oct 2019 18:21  Mg     1.7       09 Oct 2019 09:13    TPro  5.2    /  Alb  1.9    /  TBili  4.1    /  DBili  2.80   /  AST  36     /  ALT  24     /  AlkPhos  175    10 Oct 2019 08:29  TPro  5.5    /  Alb  2.0    /  TBili  4.3    /  DBili  3.00   /  AST  44     /  ALT  27     /  AlkPhos  184    09 Oct 2019 09:13  TPro  6.0    /  Alb  2.3    /  TBili  4.6    /  DBili  x      /  AST  49     /  ALT  30     /  AlkPhos  216    08 Oct 2019 18:21    PT/INR - ( 08 Oct 2019 18:21 )   PT: 20.7 sec;   INR: 1.78 ratio         PTT - ( 08 Oct 2019 18:21 )  PTT:35.2 sec      Blood Culture: Organism --  Gram Stain Blood -- Gram Stain   polymorphonuclear leukocytes seen  No organisms seen  by cytocentrifuge  Specimen Source Peritoneal Peritoneal Fluid  Culture-Blood --      10-09 @ 09:13  Pro Bnp 81        RADIOLOGY:    EKG:    Assessment/Plan:  63y Male

## 2019-10-10 NOTE — PROGRESS NOTE ADULT - SUBJECTIVE AND OBJECTIVE BOX
INTERVAL HPI/OVERNIGHT EVENTS:  pt seen and examined  denies n/v/abd pain  feels a little less bloated  no  bm, minimal flatus, inc uop  no s/s gib  labs pending    MEDICATIONS  (STANDING):  cholecalciferol 1000 Unit(s) Oral daily  dextrose 5%. 1000 milliLiter(s) (50 mL/Hr) IV Continuous <Continuous>  dextrose 50% Injectable 12.5 Gram(s) IV Push once  dextrose 50% Injectable 25 Gram(s) IV Push once  dextrose 50% Injectable 25 Gram(s) IV Push once  furosemide   Injectable 40 milliGRAM(s) IV Push every 12 hours  gabapentin 200 milliGRAM(s) Oral four times a day  hydrocortisone/polymyxin/neomycin Solution 2 Drop(s) Right Ear every 8 hours  insulin glargine Injectable (LANTUS) 40 Unit(s) SubCutaneous every morning  insulin lispro (HumaLOG) corrective regimen sliding scale   SubCutaneous three times a day before meals  insulin lispro (HumaLOG) corrective regimen sliding scale   SubCutaneous at bedtime  lactulose Syrup 30 Gram(s) Oral four times a day  lidocaine   Patch 1 Patch Transdermal daily  pantoprazole    Tablet 40 milliGRAM(s) Oral two times a day  rifaximin 550 milliGRAM(s) Oral two times a day  spironolactone 25 milliGRAM(s) Oral daily  sucralfate 1 Gram(s) Oral every 6 hours  ursodiol Capsule 300 milliGRAM(s) Oral three times a day    MEDICATIONS  (PRN):  dextrose 40% Gel 15 Gram(s) Oral once PRN Blood Glucose LESS THAN 70 milliGRAM(s)/deciLiter  glucagon  Injectable 1 milliGRAM(s) IntraMuscular once PRN Glucose <70 milliGRAM(s)/deciLiter      Allergies    codeine (Anaphylaxis)    Intolerances        Review of Systems:    General:  No wt loss, fevers, chills, night sweats, fatigue   Eyes:  Good vision, no reported pain  ENT:  No sore throat, pain, runny nose, dysphagia  CV:  No pain, palpitations, hypo/hypertension  Resp:  No dyspnea, cough, tachypnea, wheezing  GI:  No pain, No nausea, No vomiting, No diarrhea, +constipation, No weight loss, No fever, No pruritis, No rectal bleeding, No melena, No dysphagia  :  No pain, bleeding, incontinence, nocturia  Muscle:  No pain, weakness  Neuro:  No weakness, tingling, memory problems  Psych:  No fatigue, insomnia, mood problems, depression  Endocrine:  No polyuria, polydypsia, cold/heat intolerance  Heme:  No petechiae, ecchymosis, easy bruisability  Skin:  No rash, tattoos, scars, edema      Vital Signs Last 24 Hrs  T(C): 36.7 (10 Oct 2019 05:40), Max: 36.9 (09 Oct 2019 21:41)  T(F): 98 (10 Oct 2019 05:40), Max: 98.5 (09 Oct 2019 21:41)  HR: 90 (10 Oct 2019 05:40) (90 - 94)  BP: 108/61 (10 Oct 2019 05:40) (102/58 - 121/65)  BP(mean): 73 (09 Oct 2019 21:41) (73 - 73)  RR: 18 (10 Oct 2019 05:40) (14 - 18)  SpO2: 90% (10 Oct 2019 05:40) (90% - 96%)    PHYSICAL EXAM:    General:  nad lying in bed  HEENT:  NC/AT,  mild scleral icterus  Chest:  dec bs  Cardiovascular:  Regular rhythm, S1, S2  Abdomen:  Soft, nt +dt  Extremities:  +edema  Skin:  No rash  Neuro/Psych:  awake alert appropriate    LABS:                        7.8    3.58  )-----------( 82       ( 09 Oct 2019 09:13 )             23.1     10-09    140  |  105  |  15  ----------------------------<  106<H>  3.6   |  26  |  1.20    Ca    8.1<L>      09 Oct 2019 09:13  Mg     1.7     10-09    TPro  5.5<L>  /  Alb  2.0<L>  /  TBili  4.3<H>  /  DBili  3.00<H>  /  AST  44<H>  /  ALT  27  /  AlkPhos  184<H>  10-09    PT/INR - ( 08 Oct 2019 18:21 )   PT: 20.7 sec;   INR: 1.78 ratio         PTT - ( 08 Oct 2019 18:21 )  PTT:35.2 sec  Urinalysis Basic - ( 09 Oct 2019 03:34 )    Color: Yellow / Appearance: Clear / S.020 / pH: x  Gluc: x / Ketone: Negative  / Bili: Small / Urobili: 1   Blood: x / Protein: Negative / Nitrite: Negative   Leuk Esterase: Trace / RBC: 0-2 /HPF / WBC 6-10   Sq Epi: x / Non Sq Epi: Occasional / Bacteria: Occasional        RADIOLOGY & ADDITIONAL TESTS:

## 2019-10-10 NOTE — PROGRESS NOTE ADULT - PROBLEM SELECTOR PLAN 3
hx of gib  hgb ~12 in 4/2019; reports darker stools at home  f/u fobt  cont ppi bid  monitor cbc, transfuse prn  may need further eval w egd

## 2019-10-10 NOTE — CONSULT NOTE ADULT - ASSESSMENT
Mr. Segundo is a 63 year old male with non-alcoholic cirrhosis, who presents with significant volume overload and anemia. Called to evaluate for cardiac clearance prior to EGD  He has no prior cardiac history.    - no sign of acute ischemia. He has no chest pain or difficultly breathing.  - Significant volume overload on exam, though no respiratory issues and unlikely to be a primarily cardiac etiology.  - Can check a routine echocardiogram  - continue with Lasix 40 IV bid through today, though his creatinine is trending upward, and we may need to back off tomorrow.  - EKG is a SR without worrisome ischemia  - at current time, there is no evidence of acute ischemia, decompensated heart failure (despite having overall volume overload), critical valvular disease or significant arrhthymias There is no cardiac contraindication to proceeding with EGD. Routine cardiac monitoring is recommended.  - transfuse to keep Hb> 8  - Watch creatinine and electrolytes. Keep K>4, mg>2  - Will follow with you.

## 2019-10-10 NOTE — H&P ADULT - NSHPRISKHIVSCREEN_GEN_ALL_CORE
BP goal is < 140/90.   Tolerating  ARB  Controlled   Blood pressure goals discussed with patient     Body mass index is 31.65 kg/m².  Increases insulin resistance.   Discussed DM diet and exercise.      Continue to follow with Oncology   On statin per ADA recommendations  LDL goal < 100. LDL at goal. LFTs WNL. Continue statin.        On steroids when she receives chemo which is causing Marked hyperglycemia  Alters carbohydrate metabolism  Adjusting prandial insulin doses to compensate for steroids on steroid days and days following.    Optimize BG readings.     Educated patient to check feet daily for any foreign objects and/or wounds. Discussed with patient the importance of wearing appropriate footwear at all times, not to walk barefoot ever, and to check shoes before putting them on feet. Instructed patient to keep feet dry by regularly changing shoes and socks and drying feet after baths and exercises. Also, instructed patient to report any new lesions, discolorations, or swelling to a healthcare professional.         Uncontrolled.   BG readings are still mostly above goal, but improving.   Needs to eliminate in between meal snacking    Medication changes:    Continue Januvia 100 mg daily     On non steroid days:   Increase Levemir to 26 units nightly-- discussed that she needs to take her Levemir consistently at the same time.  Continue Novolog 10/12/12 units AC + correction scale goal 150, +1   Take Novolog 3 units with qhs snack if snacking.     Steroid days: To use on Steroid days and 2 days following steroids.   Increase Levemir to 30 units nightly -- however on second day post steroids go back to Levemir 26 units nightly   Adjust Novolog to 14/16/16 units + correction scale goal 150, +2   Novolog 5-6 units with qhs snack if snacking after dinner.       -- Reviewed goals of therapy are to get the best control we can without hypoglycemia  -- Refer to diabetes education-- follow-up with them today as scheduled to discussed diet  -- Reviewed patient's current insulin regimen. Clarified proper insulin dose and timing in relation to meals, etc. Insulin injection sites and proper rotation instructed.    -- Advised frequent self blood glucose monitoring.  Patient encouraged to document glucose results and bring them to every clinic visit  - AC/HS. Send logs weekly via the portal  -- Hypoglycemia precautions discussed. Instructed on precautions before driving.    -- Call for Bg repeatedly < 90 or > 180.   -- Close adherence to lifestyle changes recommended.   -- Periodic follow ups for eye evaluations, foot care and dental care suggested.         [phone] Offered and patient declined

## 2019-10-10 NOTE — PROGRESS NOTE ADULT - PROBLEM SELECTOR PLAN 1
Secondary to anasarca  As per IR, there is not a significant amount of ascites to drain  Had diagnostic paracentesis -- no evidence of SBP  Continue Lasix 40mg ivp q12h  Continue spironolactone 25mg qd  Daily weights -- 10# since yesterday  GI consult noted

## 2019-10-10 NOTE — PROGRESS NOTE ADULT - PROBLEM SELECTOR PLAN 3
Multifactorial  Check stool OB  Transfuse 1 unit PRBC  Monitor h/h  Transfuse prn  Continue PPI  MAY PROCEED TO EGD TO R/O UGI BLEED/VARICES

## 2019-10-10 NOTE — PROGRESS NOTE ADULT - PROBLEM SELECTOR PLAN 1
worsening abd dt/edema in setting of cirrhosis  ct showing small-mod ascites and diffuse soft tissue anasarca of abd wall; no bowel obstruction  sp dx para; cell count not suggestive of sbp  f/u cx and cyto  f/u doppler us   cont diuresis  monitor exam  low na/low pro diet

## 2019-10-10 NOTE — CONSULT NOTE ADULT - SUBJECTIVE AND OBJECTIVE BOX
Patient is a 63y old  Male who presents with a chief complaint of increased weight/water retention (10 Oct 2019 08:24)      Reason For Consult: dm2 uncontrolled    HPI:  63 male with non-alcoholic cirrhosis, GERD, GIB, hepatic encephalopathy presents with states for 2 months has gained "a lot of weight" -- 50# in his abdomen and bilateral le, abdomen is distended, bilateral le swelling, pitting edema,  states he has hx of non alcoholic liver cirrhosis,  seen by his PMD Dr Satish Coto today sent for admission. States seen by his urologist on Thursday last week,  cannot recall his name.  GI Dr Morales.  nonsmoker, denies etoh.  denies chest pain, sob, fever, nv, diarrhea.  States he has been constipated for 3 days, and has decreased urine output. Dark stool. (09 Oct 2019 12:35)      PAST MEDICAL & SURGICAL HISTORY:  GIB (gastrointestinal bleeding)  GERD with esophagitis: Gastritis &amp; Non Bleeding Ulcers  Hepatic encephalopathy  Obesity  Fatty liver disease, nonalcoholic  Renal stones: 25 years ago  Hypertension  Neuropathy  Hypercholesteremia  Diabetes  S/P cholecystectomy      FAMILY HISTORY:  Family history of type 2 diabetes mellitus  Family history of hypertension  Family history of stomach cancer        Social History:    MEDICATIONS  (STANDING):  cholecalciferol 1000 Unit(s) Oral daily  dextrose 5%. 1000 milliLiter(s) (50 mL/Hr) IV Continuous <Continuous>  dextrose 50% Injectable 12.5 Gram(s) IV Push once  dextrose 50% Injectable 25 Gram(s) IV Push once  dextrose 50% Injectable 25 Gram(s) IV Push once  furosemide   Injectable 40 milliGRAM(s) IV Push every 12 hours  gabapentin 200 milliGRAM(s) Oral four times a day  hydrocortisone/polymyxin/neomycin Solution 2 Drop(s) Right Ear every 8 hours  insulin glargine Injectable (LANTUS) 40 Unit(s) SubCutaneous every morning  insulin lispro (HumaLOG) corrective regimen sliding scale   SubCutaneous three times a day before meals  insulin lispro (HumaLOG) corrective regimen sliding scale   SubCutaneous at bedtime  lactulose Syrup 30 Gram(s) Oral four times a day  lidocaine   Patch 1 Patch Transdermal daily  pantoprazole    Tablet 40 milliGRAM(s) Oral two times a day  polyethylene glycol 3350 17 Gram(s) Oral two times a day  rifaximin 550 milliGRAM(s) Oral two times a day  simethicone 80 milliGRAM(s) Chew three times a day  spironolactone 25 milliGRAM(s) Oral daily  sucralfate 1 Gram(s) Oral every 6 hours  ursodiol Capsule 300 milliGRAM(s) Oral three times a day    MEDICATIONS  (PRN):  dextrose 40% Gel 15 Gram(s) Oral once PRN Blood Glucose LESS THAN 70 milliGRAM(s)/deciLiter  glucagon  Injectable 1 milliGRAM(s) IntraMuscular once PRN Glucose <70 milliGRAM(s)/deciLiter        T(C): 36.7 (10-10-19 @ 05:40), Max: 36.9 (10-09-19 @ 21:41)  HR: 90 (10-10-19 @ 05:40) (90 - 94)  BP: 108/61 (10-10-19 @ 05:40) (102/58 - 121/65)  RR: 18 (10-10-19 @ 05:40) (14 - 18)  SpO2: 90% (10-10-19 @ 05:40) (90% - 96%)  Wt(kg): --    PHYSICAL EXAM:  GENERAL: NAD, well-groomed, well-developed  HEAD:  Atraumatic, Normocephalic  NECK: Supple, No JVD, Normal thyroid  CHEST/LUNG: Clear to percussion bilaterally; No rales, rhonchi, wheezing, or rubs  HEART: Regular rate and rhythm; No murmurs, rubs, or gallops  ABDOMEN: Soft, Nontender, Nondistended; Bowel sounds present  EXTREMITIES:  2+ Peripheral Pulses, No clubbing, cyanosis, or edema  SKIN: No rashes or lesions    CAPILLARY BLOOD GLUCOSE      POCT Blood Glucose.: 133 mg/dL (10 Oct 2019 07:34)  POCT Blood Glucose.: 141 mg/dL (09 Oct 2019 21:38)  POCT Blood Glucose.: 134 mg/dL (09 Oct 2019 16:54)  POCT Blood Glucose.: 145 mg/dL (09 Oct 2019 11:46)                            7.1    3.74  )-----------( 92       ( 10 Oct 2019 08:29 )             21.5       CMP:  10-10 @ 08:29  SGPT 24  Albumin 1.9   Alk Phos 175   Anion Gap 7   SGOT 36   Total Bili 4.1   BUN 16   Calcium Total 8.1   CO2 28   Chloride 106   Creatinine 1.40   eGFR if AA 62   eGFR if non AA 53   Glucose 130   Potassium 3.4   Protein 5.2   Sodium 141      Thyroid Function Tests:      Diabetes Tests:       Radiology:

## 2019-10-10 NOTE — PROGRESS NOTE ADULT - PROBLEM SELECTOR PLAN 2
2/2 JEAN; now c/b ascites  sp dx tap; meld ~20 on admission  last mri 4/2019 no obvious liver lesions; f/u afp  last egd 4/2019- severe esophagitis, gastritis, duodenal ulceration and hh; no EV  f/u doppler US  cont ppi bid  cont sandra tid  cont rifaximin bid  cont lactulose qid, titrate to 4 soft bms/day  inc bowel regimen for constipation  cont diuretics, monitor renal fxn  trend lfts; avoid hepatotoxins

## 2019-10-11 DIAGNOSIS — K92.2 GASTROINTESTINAL HEMORRHAGE, UNSPECIFIED: ICD-10-CM

## 2019-10-11 DIAGNOSIS — D69.6 THROMBOCYTOPENIA, UNSPECIFIED: ICD-10-CM

## 2019-10-11 LAB
ALBUMIN SERPL ELPH-MCNC: 2.1 G/DL — LOW (ref 3.3–5)
ALP SERPL-CCNC: 171 U/L — HIGH (ref 40–120)
ALT FLD-CCNC: 28 U/L — SIGNIFICANT CHANGE UP (ref 12–78)
AMMONIA BLD-MCNC: 38 UMOL/L — HIGH (ref 11–32)
ANION GAP SERPL CALC-SCNC: 6 MMOL/L — SIGNIFICANT CHANGE UP (ref 5–17)
AST SERPL-CCNC: 43 U/L — HIGH (ref 15–37)
BILIRUB DIRECT SERPL-MCNC: 3.3 MG/DL — HIGH (ref 0.05–0.2)
BILIRUB INDIRECT FLD-MCNC: 1.6 MG/DL — HIGH (ref 0.2–1)
BILIRUB SERPL-MCNC: 4.9 MG/DL — HIGH (ref 0.2–1.2)
BUN SERPL-MCNC: 15 MG/DL — SIGNIFICANT CHANGE UP (ref 7–23)
CALCIUM SERPL-MCNC: 8 MG/DL — LOW (ref 8.5–10.1)
CHLORIDE SERPL-SCNC: 105 MMOL/L — SIGNIFICANT CHANGE UP (ref 96–108)
CO2 SERPL-SCNC: 30 MMOL/L — SIGNIFICANT CHANGE UP (ref 22–31)
CREAT SERPL-MCNC: 1.4 MG/DL — HIGH (ref 0.5–1.3)
GLUCOSE SERPL-MCNC: 93 MG/DL — SIGNIFICANT CHANGE UP (ref 70–99)
HCT VFR BLD CALC: 24.1 % — LOW (ref 39–50)
HGB BLD-MCNC: 7.9 G/DL — LOW (ref 13–17)
MAGNESIUM SERPL-MCNC: 1.7 MG/DL — SIGNIFICANT CHANGE UP (ref 1.6–2.6)
MCHC RBC-ENTMCNC: 32.8 GM/DL — SIGNIFICANT CHANGE UP (ref 32–36)
MCHC RBC-ENTMCNC: 33.1 PG — SIGNIFICANT CHANGE UP (ref 27–34)
MCV RBC AUTO: 100.8 FL — HIGH (ref 80–100)
NRBC # BLD: 0 /100 WBCS — SIGNIFICANT CHANGE UP (ref 0–0)
PLATELET # BLD AUTO: 89 K/UL — LOW (ref 150–400)
POTASSIUM SERPL-MCNC: 3.4 MMOL/L — LOW (ref 3.5–5.3)
POTASSIUM SERPL-SCNC: 3.4 MMOL/L — LOW (ref 3.5–5.3)
PROT SERPL-MCNC: 5.4 G/DL — LOW (ref 6–8.3)
RBC # BLD: 2.39 M/UL — LOW (ref 4.2–5.8)
RBC # FLD: 17.2 % — HIGH (ref 10.3–14.5)
SODIUM SERPL-SCNC: 141 MMOL/L — SIGNIFICANT CHANGE UP (ref 135–145)
WBC # BLD: 4.19 K/UL — SIGNIFICANT CHANGE UP (ref 3.8–10.5)
WBC # FLD AUTO: 4.19 K/UL — SIGNIFICANT CHANGE UP (ref 3.8–10.5)

## 2019-10-11 PROCEDURE — 99232 SBSQ HOSP IP/OBS MODERATE 35: CPT

## 2019-10-11 PROCEDURE — 93306 TTE W/DOPPLER COMPLETE: CPT | Mod: 26

## 2019-10-11 RX ORDER — DIPHENHYDRAMINE HCL 50 MG
25 CAPSULE ORAL ONCE
Refills: 0 | Status: COMPLETED | OUTPATIENT
Start: 2019-10-11

## 2019-10-11 RX ORDER — ACETAMINOPHEN 500 MG
650 TABLET ORAL ONCE
Refills: 0 | Status: COMPLETED | OUTPATIENT
Start: 2019-10-11 | End: 2019-10-11

## 2019-10-11 RX ORDER — DIPHENHYDRAMINE HCL 50 MG
25 CAPSULE ORAL ONCE
Refills: 0 | Status: COMPLETED | OUTPATIENT
Start: 2019-10-11 | End: 2019-10-11

## 2019-10-11 RX ORDER — FUROSEMIDE 40 MG
20 TABLET ORAL ONCE
Refills: 0 | Status: DISCONTINUED | OUTPATIENT
Start: 2019-10-11 | End: 2019-10-12

## 2019-10-11 RX ADMIN — Medication 650 MILLIGRAM(S): at 17:09

## 2019-10-11 RX ADMIN — GABAPENTIN 200 MILLIGRAM(S): 400 CAPSULE ORAL at 17:18

## 2019-10-11 RX ADMIN — SIMETHICONE 80 MILLIGRAM(S): 80 TABLET, CHEWABLE ORAL at 17:10

## 2019-10-11 RX ADMIN — Medication 1000 UNIT(S): at 17:11

## 2019-10-11 RX ADMIN — POLYETHYLENE GLYCOL 3350 17 GRAM(S): 17 POWDER, FOR SOLUTION ORAL at 05:52

## 2019-10-11 RX ADMIN — Medication 650 MILLIGRAM(S): at 17:20

## 2019-10-11 RX ADMIN — SIMETHICONE 80 MILLIGRAM(S): 80 TABLET, CHEWABLE ORAL at 22:12

## 2019-10-11 RX ADMIN — LACTULOSE 45 GRAM(S): 10 SOLUTION ORAL at 12:14

## 2019-10-11 RX ADMIN — URSODIOL 300 MILLIGRAM(S): 250 TABLET, FILM COATED ORAL at 05:53

## 2019-10-11 RX ADMIN — Medication 2 DROP(S): at 05:53

## 2019-10-11 RX ADMIN — Medication 25 MILLIGRAM(S): at 17:09

## 2019-10-11 RX ADMIN — LACTULOSE 45 GRAM(S): 10 SOLUTION ORAL at 05:52

## 2019-10-11 RX ADMIN — Medication 40 MILLIGRAM(S): at 17:11

## 2019-10-11 RX ADMIN — LIDOCAINE 1 PATCH: 4 CREAM TOPICAL at 01:02

## 2019-10-11 RX ADMIN — GABAPENTIN 200 MILLIGRAM(S): 400 CAPSULE ORAL at 12:20

## 2019-10-11 RX ADMIN — Medication 2: at 17:11

## 2019-10-11 RX ADMIN — URSODIOL 300 MILLIGRAM(S): 250 TABLET, FILM COATED ORAL at 17:10

## 2019-10-11 RX ADMIN — PANTOPRAZOLE SODIUM 40 MILLIGRAM(S): 20 TABLET, DELAYED RELEASE ORAL at 05:53

## 2019-10-11 RX ADMIN — Medication 1 GRAM(S): at 17:10

## 2019-10-11 RX ADMIN — Medication 2 DROP(S): at 22:13

## 2019-10-11 RX ADMIN — Medication 1 GRAM(S): at 12:14

## 2019-10-11 RX ADMIN — Medication 1 GRAM(S): at 05:53

## 2019-10-11 RX ADMIN — SPIRONOLACTONE 25 MILLIGRAM(S): 25 TABLET, FILM COATED ORAL at 05:52

## 2019-10-11 RX ADMIN — SIMETHICONE 80 MILLIGRAM(S): 80 TABLET, CHEWABLE ORAL at 05:52

## 2019-10-11 RX ADMIN — URSODIOL 300 MILLIGRAM(S): 250 TABLET, FILM COATED ORAL at 22:12

## 2019-10-11 RX ADMIN — GABAPENTIN 200 MILLIGRAM(S): 400 CAPSULE ORAL at 05:53

## 2019-10-11 RX ADMIN — Medication 40 MILLIGRAM(S): at 05:52

## 2019-10-11 NOTE — PROGRESS NOTE ADULT - ASSESSMENT
63 year old male with non-alcoholic cirrhosis, who presents with significant volume overload and anemia. Called to evaluate for cardiac clearance prior to EGD      - no sign of acute ischemia  -Hypervolemic on exam with bilateral LE edmea, continue with 40mg IV lasix BID with strict intake and output, daily renal profile  -negative 1060 cc 24 hours , no orthopnea laying flat in no distress   -2decho results pending      no cardiac contraindication to proceeding with EGD. Routine cardiac monitoring is recommended.  - transfuse to keep Hb> 8  - Watch creatinine and electrolytes. Keep K>4, mg>2  - Will follow with you.  further plan and recommendations pending results of above and clinical course     Salome Dorado FNP-C  Cardiology NP  [facility] [phone] 63 year old male with non-alcoholic cirrhosis, who presents with significant volume overload and anemia. Called to evaluate for cardiac clearance prior to EGD      - no sign of acute ischemia  -Hypervolemic on exam with bilateral LE edmea, continue with 40mg IV lasix BID with strict intake and output, daily renal profile  -negative 1060 cc 24 hours , no orthopnea laying flat in no distress   -routine and non-urgent echo ordered     no cardiac contraindication to proceeding with EGD. Routine cardiac monitoring is recommended.  - transfuse to keep Hb> 8  - Watch creatinine and electrolytes. Keep K>4, mg>2  - Will follow with you.  further plan and recommendations pending results of above and clinical course     Salome Dorado FNP-C  Cardiology NP  [facility] [phone]

## 2019-10-11 NOTE — PROGRESS NOTE ADULT - PROBLEM SELECTOR PLAN 1
cont lantus 40 units qam  cont humalog scale coverage  cont cons cho diet  goal bg 100-180 in hosp setting

## 2019-10-11 NOTE — CONSULT NOTE ADULT - SUBJECTIVE AND OBJECTIVE BOX
Patient is a 63y old  Male who presents with a chief complaint of increased weight/water retention (11 Oct 2019 14:39)      HPI:  63 male with non-alcoholic cirrhosis, GERD, GIB, hepatic encephalopathy presents with states for 2 months has gained "a lot of weight" -- 50# in his abdomen and bilateral le, abdomen is distended, bilateral le swelling, pitting edema,  states he has hx of non alcoholic liver cirrhosis,  seen by his PMD Dr Satish Coto today sent for admission. States seen by his urologist on Thursday last week,  cannot recall his name.  GI Dr Morales.  nonsmoker, denies etoh.  denies chest pain, sob, fever, nv, diarrhea.  States he has been constipated for 3 days, and has decreased urine output. Dark stool. (09 Oct 2019 12:35)       ROS:  Negative except for:    PAST MEDICAL & SURGICAL HISTORY:  GIB (gastrointestinal bleeding)  GERD with esophagitis: Gastritis &amp; Non Bleeding Ulcers  Hepatic encephalopathy  Obesity  Fatty liver disease, nonalcoholic  Renal stones: 25 years ago  Hypertension  Neuropathy  Hypercholesteremia  Diabetes  S/P cholecystectomy      SOCIAL HISTORY:    FAMILY HISTORY:  Family history of type 2 diabetes mellitus  Family history of hypertension  Family history of stomach cancer      MEDICATIONS  (STANDING):  acetaminophen   Tablet .. 650 milliGRAM(s) Oral once  cholecalciferol 1000 Unit(s) Oral daily  dextrose 5%. 1000 milliLiter(s) (50 mL/Hr) IV Continuous <Continuous>  dextrose 50% Injectable 12.5 Gram(s) IV Push once  dextrose 50% Injectable 25 Gram(s) IV Push once  dextrose 50% Injectable 25 Gram(s) IV Push once  diphenhydrAMINE 25 milliGRAM(s) Oral once  furosemide   Injectable 20 milliGRAM(s) IV Push once  furosemide   Injectable 40 milliGRAM(s) IV Push every 12 hours  gabapentin 200 milliGRAM(s) Oral four times a day  hydrocortisone/polymyxin/neomycin Solution 2 Drop(s) Right Ear every 8 hours  insulin lispro (HumaLOG) corrective regimen sliding scale   SubCutaneous three times a day before meals  insulin lispro (HumaLOG) corrective regimen sliding scale   SubCutaneous at bedtime  lactulose Syrup 45 Gram(s) Oral four times a day  lidocaine   Patch 1 Patch Transdermal daily  pantoprazole    Tablet 40 milliGRAM(s) Oral two times a day  polyethylene glycol 3350 17 Gram(s) Oral two times a day  rifaximin 550 milliGRAM(s) Oral two times a day  simethicone 80 milliGRAM(s) Chew three times a day  spironolactone 25 milliGRAM(s) Oral daily  sucralfate 1 Gram(s) Oral every 6 hours  ursodiol Capsule 300 milliGRAM(s) Oral three times a day    MEDICATIONS  (PRN):  dextrose 40% Gel 15 Gram(s) Oral once PRN Blood Glucose LESS THAN 70 milliGRAM(s)/deciLiter  glucagon  Injectable 1 milliGRAM(s) IntraMuscular once PRN Glucose <70 milliGRAM(s)/deciLiter      Allergies    codeine (Anaphylaxis)    Intolerances        Vital Signs Last 24 Hrs  T(C): 36.5 (11 Oct 2019 13:27), Max: 36.9 (10 Oct 2019 18:44)  T(F): 97.7 (11 Oct 2019 13:27), Max: 98.5 (10 Oct 2019 19:44)  HR: 88 (11 Oct 2019 13:27) (84 - 94)  BP: 115/56 (11 Oct 2019 13:27) (106/50 - 133/61)  BP(mean): 85 (10 Oct 2019 22:15) (85 - 86)  RR: 18 (11 Oct 2019 13:27) (17 - 19)  SpO2: 94% (11 Oct 2019 13:27) (92% - 96%)    PHYSICAL EXAM  General: adult in NAD  HEENT: clear oropharynx, anicteric sclera, pink conjunctivae  Neck: supple  CV: normal S1S2 with no murmur rubs or gallops  Lungs: clear to auscultation, no wheezes, no rhales  Abdomen: soft non-tender non-distended, no hepato/splenomegaly  Ext: no clubbing cyanosis or edema  Skin: no rashes and no petichiae  Neuro: alert and oriented X3 no focal deficits      LABS:    CBC Full  -  ( 11 Oct 2019 09:02 )  WBC Count : 4.19 K/uL  RBC Count : 2.39 M/uL  Hemoglobin : 7.9 g/dL  Hematocrit : 24.1 %  Platelet Count - Automated : 89 K/uL  Mean Cell Volume : 100.8 fl  Mean Cell Hemoglobin : 33.1 pg  Mean Cell Hemoglobin Concentration : 32.8 gm/dL  Auto Neutrophil # : x  Auto Lymphocyte # : x  Auto Monocyte # : x  Auto Eosinophil # : x  Auto Basophil # : x  Auto Neutrophil % : x  Auto Lymphocyte % : x  Auto Monocyte % : x  Auto Eosinophil % : x  Auto Basophil % : x    10-11    141  |  105  |  15  ----------------------------<  93  3.4<L>   |  30  |  1.40<H>    Ca    8.0<L>      11 Oct 2019 09:02  Mg     1.7     10-11    TPro  5.4<L>  /  Alb  2.1<L>  /  TBili  4.9<H>  /  DBili  3.30<H>  /  AST  43<H>  /  ALT  28  /  AlkPhos  171<H>  10-11      Iron - Total Binding Capacity.: 231 ug/dL (10-09 @ 10:49)  Ferritin, Serum: 70 ng/mL (10-09 @ 10:38)          BLOOD SMEAR INTERPRETATION:    RADIOLOGY & ADDITIONAL STUDIES:    < from: CT Abdomen and Pelvis No Cont (10.08.19 @ 19:23) >  EXAM:  CT ABDOMEN AND PELVIS                            PROCEDURE DATE:  10/08/2019          INTERPRETATION:  CLINICAL INFORMATION: Anasarca, weight gain.     COMPARISON: CT abdomen pelvis 4/3/2019.    PROCEDURE:   CT of the Abdomen and Pelvis was performed without intravenous contrast.   Intravenous contrast: None.  Oral contrast: None.  Sagittal and coronal reformats were performed.    FINDINGS:    LOWER CHEST: There are no pleural or pericardial effusions. The heart   size is normal. There is aortic valvular and coronary artery   calcification. The imaged lung bases demonstrate posterior dependent   atelectasis.    LIVER: The liver is heterogeneous with a prominent left lobe and   suggestion of contour nodularity, suspicious for cirrhosis.  BILE DUCTS: Normal caliber.  GALLBLADDER: Status postcholecystectomy.  SPLEEN: Within normal limits.  PANCREAS: Within normal limits.  ADRENALS: Within normal limits.  KIDNEYS/URETERS: No hydronephrosis. There is a 5 mm nonobstructing   calculus at the lower pole of the left kidney.    BLADDER: Within normal limits.  REPRODUCTIVE ORGANS: Prostate within normal limits.    BOWEL: No bowel obstruction. Appendix is within normal limits.  PERITONEUM: Small abdominopelvic ascites.  VESSELS: The aorta and IVC are normal in caliber. There is   atherosclerosis of the aorta. There are tortuous collateral vessels again   noted in the left periaortic region, likely a splenorenal shunt.  RETROPERITONEUM/LYMPH NODES: No lymphadenopathy.    ABDOMINAL WALL: Diffuse soft tissue anasarca.  BONES: Degenerative change of the thoracic spine.    IMPRESSION:     Compared to 4/3/2019:    Interval development of moderate abdominal pelvic ascites.  Changes in the liver suspicious for cirrhosis.  Tortuous collateral vessels in the left abdomen.  5 mm nonobstructing calculus in the left kidney.  Other incidental findings are as above.                    ANUSHA PIERSON M.D. ATTENDING RADIOLOGIST  This document has been electronically signed. Oct  8 2019  7:47PM                < end of copied text >

## 2019-10-11 NOTE — PROGRESS NOTE ADULT - SUBJECTIVE AND OBJECTIVE BOX
CAPILLARY BLOOD GLUCOSE      POCT Blood Glucose.: 101 mg/dL (10 Oct 2019 21:47)  POCT Blood Glucose.: 93 mg/dL (10 Oct 2019 16:33)  POCT Blood Glucose.: 132 mg/dL (10 Oct 2019 11:44)      Vital Signs Last 24 Hrs  T(C): 36.6 (11 Oct 2019 05:23), Max: 36.9 (10 Oct 2019 18:44)  T(F): 97.9 (11 Oct 2019 05:23), Max: 98.5 (10 Oct 2019 19:44)  HR: 91 (11 Oct 2019 05:23) (84 - 94)  BP: 106/50 (11 Oct 2019 05:23) (106/50 - 133/61)  BP(mean): 85 (10 Oct 2019 22:15) (85 - 86)  RR: 18 (11 Oct 2019 05:23) (17 - 18)  SpO2: 92% (11 Oct 2019 05:23) (92% - 96%)    General: WN/WD NAD  Respiratory: CTA B/L  CV: RRR, S1S2, no murmurs, rubs or gallops  Abdominal: Soft, NT, ND +BS, Last BM  Extremities: No edema, + peripheral pulses    Hemoglobin A1C, Whole Blood: 4.9 % (10-09 @ 01:02)   10-10    141  |  106  |  16  ----------------------------<  130<H>  3.4<L>   |  28  |  1.40<H>    Ca    8.1<L>      10 Oct 2019 08:29  Mg     1.7     10-09    TPro  5.2<L>  /  Alb  1.9<L>  /  TBili  4.1<H>  /  DBili  2.80<H>  /  AST  36  /  ALT  24  /  AlkPhos  175<H>  10-10      dextrose 40% Gel 15 Gram(s) Oral once PRN  dextrose 50% Injectable 12.5 Gram(s) IV Push once  dextrose 50% Injectable 25 Gram(s) IV Push once  dextrose 50% Injectable 25 Gram(s) IV Push once  glucagon  Injectable 1 milliGRAM(s) IntraMuscular once PRN  insulin glargine Injectable (LANTUS) 40 Unit(s) SubCutaneous every morning  insulin lispro (HumaLOG) corrective regimen sliding scale   SubCutaneous three times a day before meals  insulin lispro (HumaLOG) corrective regimen sliding scale   SubCutaneous at bedtime

## 2019-10-11 NOTE — PROGRESS NOTE ADULT - SUBJECTIVE AND OBJECTIVE BOX
Patient is a 63y old  Male who presents with a chief complaint of increased weight/water retention (11 Oct 2019 08:07)      INTERVAL HPI/OVERNIGHT EVENTS: Patient seen and examined. NAD. No complaints.    Vital Signs Last 24 Hrs  T(C): 36.6 (11 Oct 2019 05:23), Max: 36.9 (10 Oct 2019 18:44)  T(F): 97.9 (11 Oct 2019 05:23), Max: 98.5 (10 Oct 2019 19:44)  HR: 91 (11 Oct 2019 05:23) (84 - 94)  BP: 106/50 (11 Oct 2019 05:23) (106/50 - 133/61)  BP(mean): 85 (10 Oct 2019 22:15) (85 - 86)  RR: 18 (11 Oct 2019 05:23) (17 - 18)  SpO2: 92% (11 Oct 2019 05:23) (92% - 96%)    10-11    141  |  105  |  15  ----------------------------<  93  3.4<L>   |  30  |  1.40<H>    Ca    8.0<L>      11 Oct 2019 09:02  Mg     1.7     10-11    TPro  5.4<L>  /  Alb  2.1<L>  /  TBili  4.9<H>  /  DBili  3.30<H>  /  AST  43<H>  /  ALT  28  /  AlkPhos  171<H>  10-11                          7.9    4.19  )-----------( 89       ( 11 Oct 2019 09:02 )             24.1       CAPILLARY BLOOD GLUCOSE      POCT Blood Glucose.: 98 mg/dL (11 Oct 2019 08:40)  POCT Blood Glucose.: 101 mg/dL (10 Oct 2019 21:47)  POCT Blood Glucose.: 93 mg/dL (10 Oct 2019 16:33)  POCT Blood Glucose.: 132 mg/dL (10 Oct 2019 11:44)              acetaminophen   Tablet .. 650 milliGRAM(s) Oral once  cholecalciferol 1000 Unit(s) Oral daily  dextrose 40% Gel 15 Gram(s) Oral once PRN  dextrose 5%. 1000 milliLiter(s) IV Continuous <Continuous>  dextrose 50% Injectable 12.5 Gram(s) IV Push once  dextrose 50% Injectable 25 Gram(s) IV Push once  dextrose 50% Injectable 25 Gram(s) IV Push once  furosemide   Injectable 40 milliGRAM(s) IV Push every 12 hours  gabapentin 200 milliGRAM(s) Oral four times a day  glucagon  Injectable 1 milliGRAM(s) IntraMuscular once PRN  hydrocortisone/polymyxin/neomycin Solution 2 Drop(s) Right Ear every 8 hours  insulin lispro (HumaLOG) corrective regimen sliding scale   SubCutaneous three times a day before meals  insulin lispro (HumaLOG) corrective regimen sliding scale   SubCutaneous at bedtime  lactulose Syrup 45 Gram(s) Oral four times a day  lidocaine   Patch 1 Patch Transdermal daily  pantoprazole    Tablet 40 milliGRAM(s) Oral two times a day  polyethylene glycol 3350 17 Gram(s) Oral two times a day  rifaximin 550 milliGRAM(s) Oral two times a day  simethicone 80 milliGRAM(s) Chew three times a day  spironolactone 25 milliGRAM(s) Oral daily  sucralfate 1 Gram(s) Oral every 6 hours  ursodiol Capsule 300 milliGRAM(s) Oral three times a day              REVIEW OF SYSTEMS:  CONSTITUTIONAL: No fever, no weight loss, or no fatigue  NECK: No pain, no stiffness  RESPIRATORY: No cough, no wheezing, no chills, no hemoptysis, No shortness of breath  CARDIOVASCULAR: No chest pain, no palpitations, no dizziness, no leg swelling  GASTROINTESTINAL: No abdominal pain. No nausea, no vomiting, no hematemesis; No diarrhea, no constipation. No melena, no hematochezia.  GENITOURINARY: No dysuria, no frequency, no hematuria, no incontinence  NEUROLOGICAL: No headaches, no loss of strength, no numbness, no tremors  SKIN: No itching, no burning  MUSCULOSKELETAL: No joint pain, no swelling; No muscle, no back, no extremity pain  PSYCHIATRIC: No depression, no mood swings,   HEME/LYMPH: No easy bruising, no bleeding gums  ALLERY AND IMMUNOLOGIC: No hives       Consultant(s) Notes Reviewed:  [X] YES  [ ] NO    PHYSICAL EXAM:  GENERAL: NAD  HEAD:  Atraumatic, Normocephalic  EYES: EOMI, PERRLA, conjunctiva and sclera clear  ENMT: No tonsillar erythema, exudates, or enlargement; Moist mucous membranes  NECK: Supple, No JVD  NERVOUS SYSTEM:  Awake & alert  CHEST/LUNG: Clear to auscultation bilaterally; No rales, rhonchi, wheezing,  HEART: Regular rate and rhythm  ABDOMEN: Soft, Nontender, + distended; Bowel sounds present  EXTREMITIES:  No clubbing, cyanosis, + edema  LYMPH: No lymphadenopathy noted  SKIN: No rashes      Advanced care planning discussed with patient/family [X] YES   [ ] NO    Advanced care planning discussed with patient/family. Advanced care planning forms reviewed/discussed/completed. 20 minutes spent.

## 2019-10-11 NOTE — PROGRESS NOTE ADULT - PROBLEM SELECTOR PLAN 1
Secondary to anasarca  As per IR, there is not a significant amount of ascites to drain  Had diagnostic paracentesis -- no evidence of SBP  Continue Lasix 40mg ivp q12h  Continue spironolactone 25mg qd  Daily weights   GI consult noted

## 2019-10-11 NOTE — PROGRESS NOTE ADULT - PROBLEM SELECTOR PLAN 3
Multifactorial  OB + stool  Patient also with low haptoglobin and high LDH -- ?hemolysis -- heme consult called  S/P 1 unit PRBC   Will likely give another PRBC after EGD  Monitor h/h  Transfuse prn  Continue PPI  MAY PROCEED TO EGD TO R/O UGI BLEED/VARICES

## 2019-10-11 NOTE — CONSULT NOTE ADULT - ASSESSMENT
Anemia multifactorial in etiology related to renal insufficiency, chronic disease and GIB.  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 follow CBC and transfuse as indicated  2.  transfuse 1 unit PRBC  3.  continue present med/GI treatment and diuresis  4.  further heme recommendations pending above  discussed with Dr. Franks

## 2019-10-11 NOTE — PROGRESS NOTE ADULT - SUBJECTIVE AND OBJECTIVE BOX
[facility] -- Sushila Dhaliwal, Morenita Camacho Pannella, Patel, Savella  Office # [phone]      Follow Up:    LE edema    Subjective/Observations:     No events overnight resting comfortably in bed.  No complaints of chest pain, dyspnea, or palpitations reported. No signs of orthopnea or PND.  REVIEW OF SYSTEMS: All other review of systems is negative unless indicated above    PAST MEDICAL & SURGICAL HISTORY:  GIB (gastrointestinal bleeding)  GERD with esophagitis: Gastritis &amp; Non Bleeding Ulcers  Hepatic encephalopathy  Obesity  Fatty liver disease, nonalcoholic  Renal stones: 25 years ago  Hypertension  Neuropathy  Hypercholesteremia  Diabetes  S/P cholecystectomy      MEDICATIONS  (STANDING):  acetaminophen   Tablet .. 650 milliGRAM(s) Oral once  cholecalciferol 1000 Unit(s) Oral daily  dextrose 5%. 1000 milliLiter(s) (50 mL/Hr) IV Continuous <Continuous>  dextrose 50% Injectable 12.5 Gram(s) IV Push once  dextrose 50% Injectable 25 Gram(s) IV Push once  dextrose 50% Injectable 25 Gram(s) IV Push once  furosemide   Injectable 40 milliGRAM(s) IV Push every 12 hours  gabapentin 200 milliGRAM(s) Oral four times a day  hydrocortisone/polymyxin/neomycin Solution 2 Drop(s) Right Ear every 8 hours  insulin lispro (HumaLOG) corrective regimen sliding scale   SubCutaneous three times a day before meals  insulin lispro (HumaLOG) corrective regimen sliding scale   SubCutaneous at bedtime  lactulose Syrup 45 Gram(s) Oral four times a day  lidocaine   Patch 1 Patch Transdermal daily  pantoprazole    Tablet 40 milliGRAM(s) Oral two times a day  polyethylene glycol 3350 17 Gram(s) Oral two times a day  rifaximin 550 milliGRAM(s) Oral two times a day  simethicone 80 milliGRAM(s) Chew three times a day  spironolactone 25 milliGRAM(s) Oral daily  sucralfate 1 Gram(s) Oral every 6 hours  ursodiol Capsule 300 milliGRAM(s) Oral three times a day    MEDICATIONS  (PRN):  dextrose 40% Gel 15 Gram(s) Oral once PRN Blood Glucose LESS THAN 70 milliGRAM(s)/deciLiter  glucagon  Injectable 1 milliGRAM(s) IntraMuscular once PRN Glucose <70 milliGRAM(s)/deciLiter      Allergies    codeine (Anaphylaxis)    Intolerances        Vital Signs Last 24 Hrs  T(C): 36.6 (11 Oct 2019 05:23), Max: 36.9 (10 Oct 2019 18:44)  T(F): 97.9 (11 Oct 2019 05:23), Max: 98.5 (10 Oct 2019 19:44)  HR: 91 (11 Oct 2019 05:23) (84 - 94)  BP: 106/50 (11 Oct 2019 05:23) (106/50 - 133/61)  BP(mean): 85 (10 Oct 2019 22:15) (85 - 86)  RR: 18 (11 Oct 2019 05:23) (17 - 18)  SpO2: 92% (11 Oct 2019 05:23) (92% - 96%)    I&O's Summary    10 Oct 2019 07:01  -  11 Oct 2019 07:00  --------------------------------------------------------  IN: 240 mL / OUT: 1300 mL / NET: -1060 mL          PHYSICAL EXAM:  TELE: not on tele   Constitutional: NAD, awake and alert, obese   HEENT: Moist Mucous Membranes, Anicteric  Pulmonary: Non-labored, breath sounds are clear bilaterally, No wheezing, crackles or rhonchi  Cardiovascular: Regular, S1 and S2 nl, No murmurs, rubs, gallops or clicks  Gastrointestinal: Bowel Sounds present, soft, nontender.   Lymph: + 2 bilateral LE edema   Skin: No visible rashes or ulcers.  Psych:  Mood & affect appropriate    LABS: All Labs Reviewed:                        7.9    4.19  )-----------( 89       ( 11 Oct 2019 09:02 )             24.1                         7.1    3.74  )-----------( 92       ( 10 Oct 2019 08:29 )             21.5                         7.8    3.58  )-----------( 82       ( 09 Oct 2019 09:13 )             23.1     11 Oct 2019 09:02    141    |  105    |  15     ----------------------------<  93     3.4     |  30     |  1.40   10 Oct 2019 08:29    141    |  106    |  16     ----------------------------<  130    3.4     |  28     |  1.40   09 Oct 2019 09:13    140    |  105    |  15     ----------------------------<  106    3.6     |  26     |  1.20     Ca    8.0        11 Oct 2019 09:02  Ca    8.1        10 Oct 2019 08:29  Ca    8.1        09 Oct 2019 09:13  Mg     1.7       11 Oct 2019 09:02  Mg     1.7       09 Oct 2019 09:13    TPro  5.4    /  Alb  2.1    /  TBili  4.9    /  DBili  3.30   /  AST  43     /  ALT  28     /  AlkPhos  171    11 Oct 2019 09:02  TPro  5.2    /  Alb  1.9    /  TBili  4.1    /  DBili  2.80   /  AST  36     /  ALT  24     /  AlkPhos  175    10 Oct 2019 08:29  TPro  5.5    /  Alb  2.0    /  TBili  4.3    /  DBili  3.00   /  AST  44     /  ALT  27     /  AlkPhos  184    09 Oct 2019 09:13             ECG:  < from: 12 Lead ECG (10.08.19 @ 18:43) >    Ventricular Rate 92 BPM    Atrial Rate 92 BPM    P-R Interval 200 ms    QRS Duration 96 ms    Q-T Interval 408 ms    QTC Calculation(Bezet) 504 ms    P Axis 39 degrees    R Axis 8 degrees    T Axis 17 degrees    Diagnosis Line Normal sinus rhythm  Low voltage QRS  Cannot rule out Inferior infarct (cited on or before 16-APR-2019)  Prolonged QT    < end of copied text >

## 2019-10-11 NOTE — CONSULT NOTE ADULT - PROBLEM SELECTOR PROBLEM 1
Type 2 diabetes mellitus with other specified complication, with long-term current use of insulin
Abdominal distension
Anemia

## 2019-10-11 NOTE — PROGRESS NOTE ADULT - SUBJECTIVE AND OBJECTIVE BOX
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

## 2019-10-12 ENCOUNTER — TRANSCRIPTION ENCOUNTER (OUTPATIENT)
Age: 64
End: 2019-10-12

## 2019-10-12 VITALS
HEART RATE: 82 BPM | RESPIRATION RATE: 17 BRPM | SYSTOLIC BLOOD PRESSURE: 111 MMHG | OXYGEN SATURATION: 95 % | DIASTOLIC BLOOD PRESSURE: 66 MMHG | TEMPERATURE: 98 F

## 2019-10-12 LAB
AMMONIA BLD-MCNC: 34 UMOL/L — HIGH (ref 11–32)
ANION GAP SERPL CALC-SCNC: 5 MMOL/L — SIGNIFICANT CHANGE UP (ref 5–17)
BUN SERPL-MCNC: 14 MG/DL — SIGNIFICANT CHANGE UP (ref 7–23)
CALCIUM SERPL-MCNC: 8.3 MG/DL — LOW (ref 8.5–10.1)
CHLORIDE SERPL-SCNC: 104 MMOL/L — SIGNIFICANT CHANGE UP (ref 96–108)
CO2 SERPL-SCNC: 31 MMOL/L — SIGNIFICANT CHANGE UP (ref 22–31)
CREAT SERPL-MCNC: 1.3 MG/DL — SIGNIFICANT CHANGE UP (ref 0.5–1.3)
GLUCOSE SERPL-MCNC: 82 MG/DL — SIGNIFICANT CHANGE UP (ref 70–99)
HCT VFR BLD CALC: 25.9 % — LOW (ref 39–50)
HGB BLD-MCNC: 8.6 G/DL — LOW (ref 13–17)
MAGNESIUM SERPL-MCNC: 1.7 MG/DL — SIGNIFICANT CHANGE UP (ref 1.6–2.6)
MCHC RBC-ENTMCNC: 33.2 GM/DL — SIGNIFICANT CHANGE UP (ref 32–36)
MCHC RBC-ENTMCNC: 33.5 PG — SIGNIFICANT CHANGE UP (ref 27–34)
MCV RBC AUTO: 100.8 FL — HIGH (ref 80–100)
NRBC # BLD: 0 /100 WBCS — SIGNIFICANT CHANGE UP (ref 0–0)
PLATELET # BLD AUTO: 86 K/UL — LOW (ref 150–400)
POTASSIUM SERPL-MCNC: 3.3 MMOL/L — LOW (ref 3.5–5.3)
POTASSIUM SERPL-SCNC: 3.3 MMOL/L — LOW (ref 3.5–5.3)
RBC # BLD: 2.57 M/UL — LOW (ref 4.2–5.8)
RBC # FLD: 17.2 % — HIGH (ref 10.3–14.5)
SODIUM SERPL-SCNC: 140 MMOL/L — SIGNIFICANT CHANGE UP (ref 135–145)
WBC # BLD: 4.33 K/UL — SIGNIFICANT CHANGE UP (ref 3.8–10.5)
WBC # FLD AUTO: 4.33 K/UL — SIGNIFICANT CHANGE UP (ref 3.8–10.5)

## 2019-10-12 PROCEDURE — 96374 THER/PROPH/DIAG INJ IV PUSH: CPT

## 2019-10-12 PROCEDURE — 87102 FUNGUS ISOLATION CULTURE: CPT

## 2019-10-12 PROCEDURE — 82746 ASSAY OF FOLIC ACID SERUM: CPT

## 2019-10-12 PROCEDURE — 85730 THROMBOPLASTIN TIME PARTIAL: CPT

## 2019-10-12 PROCEDURE — 74176 CT ABD & PELVIS W/O CONTRAST: CPT

## 2019-10-12 PROCEDURE — 89051 BODY FLUID CELL COUNT: CPT

## 2019-10-12 PROCEDURE — 82607 VITAMIN B-12: CPT

## 2019-10-12 PROCEDURE — 82140 ASSAY OF AMMONIA: CPT

## 2019-10-12 PROCEDURE — 80053 COMPREHEN METABOLIC PANEL: CPT

## 2019-10-12 PROCEDURE — 87206 SMEAR FLUORESCENT/ACID STAI: CPT

## 2019-10-12 PROCEDURE — 86850 RBC ANTIBODY SCREEN: CPT

## 2019-10-12 PROCEDURE — 82272 OCCULT BLD FECES 1-3 TESTS: CPT

## 2019-10-12 PROCEDURE — 93005 ELECTROCARDIOGRAM TRACING: CPT

## 2019-10-12 PROCEDURE — 99232 SBSQ HOSP IP/OBS MODERATE 35: CPT

## 2019-10-12 PROCEDURE — 84157 ASSAY OF PROTEIN OTHER: CPT

## 2019-10-12 PROCEDURE — 82042 OTHER SOURCE ALBUMIN QUAN EA: CPT

## 2019-10-12 PROCEDURE — 83690 ASSAY OF LIPASE: CPT

## 2019-10-12 PROCEDURE — 85045 AUTOMATED RETICULOCYTE COUNT: CPT

## 2019-10-12 PROCEDURE — 87070 CULTURE OTHR SPECIMN AEROBIC: CPT

## 2019-10-12 PROCEDURE — 83615 LACTATE (LD) (LDH) ENZYME: CPT

## 2019-10-12 PROCEDURE — 83036 HEMOGLOBIN GLYCOSYLATED A1C: CPT

## 2019-10-12 PROCEDURE — 83010 ASSAY OF HAPTOGLOBIN QUANT: CPT

## 2019-10-12 PROCEDURE — 83735 ASSAY OF MAGNESIUM: CPT

## 2019-10-12 PROCEDURE — 83540 ASSAY OF IRON: CPT

## 2019-10-12 PROCEDURE — 85610 PROTHROMBIN TIME: CPT

## 2019-10-12 PROCEDURE — 80076 HEPATIC FUNCTION PANEL: CPT

## 2019-10-12 PROCEDURE — 83880 ASSAY OF NATRIURETIC PEPTIDE: CPT

## 2019-10-12 PROCEDURE — 99285 EMERGENCY DEPT VISIT HI MDM: CPT | Mod: 25

## 2019-10-12 PROCEDURE — 93975 VASCULAR STUDY: CPT

## 2019-10-12 PROCEDURE — 36415 COLL VENOUS BLD VENIPUNCTURE: CPT

## 2019-10-12 PROCEDURE — 85027 COMPLETE CBC AUTOMATED: CPT

## 2019-10-12 PROCEDURE — 80048 BASIC METABOLIC PNL TOTAL CA: CPT

## 2019-10-12 PROCEDURE — 82962 GLUCOSE BLOOD TEST: CPT

## 2019-10-12 PROCEDURE — 87075 CULTR BACTERIA EXCEPT BLOOD: CPT

## 2019-10-12 PROCEDURE — 86900 BLOOD TYPING SEROLOGIC ABO: CPT

## 2019-10-12 PROCEDURE — 86901 BLOOD TYPING SEROLOGIC RH(D): CPT

## 2019-10-12 PROCEDURE — 82945 GLUCOSE OTHER FLUID: CPT

## 2019-10-12 PROCEDURE — 82728 ASSAY OF FERRITIN: CPT

## 2019-10-12 PROCEDURE — 93306 TTE W/DOPPLER COMPLETE: CPT

## 2019-10-12 PROCEDURE — 88305 TISSUE EXAM BY PATHOLOGIST: CPT

## 2019-10-12 PROCEDURE — 82150 ASSAY OF AMYLASE: CPT

## 2019-10-12 PROCEDURE — 87116 MYCOBACTERIA CULTURE: CPT

## 2019-10-12 PROCEDURE — 81001 URINALYSIS AUTO W/SCOPE: CPT

## 2019-10-12 PROCEDURE — 87015 SPECIMEN INFECT AGNT CONCNTJ: CPT

## 2019-10-12 PROCEDURE — 71045 X-RAY EXAM CHEST 1 VIEW: CPT

## 2019-10-12 PROCEDURE — 88108 CYTOPATH CONCENTRATE TECH: CPT

## 2019-10-12 PROCEDURE — 36430 TRANSFUSION BLD/BLD COMPNT: CPT

## 2019-10-12 PROCEDURE — 86923 COMPATIBILITY TEST ELECTRIC: CPT

## 2019-10-12 PROCEDURE — 82105 ALPHA-FETOPROTEIN SERUM: CPT

## 2019-10-12 PROCEDURE — 87205 SMEAR GRAM STAIN: CPT

## 2019-10-12 PROCEDURE — 49083 ABD PARACENTESIS W/IMAGING: CPT

## 2019-10-12 PROCEDURE — P9016: CPT

## 2019-10-12 PROCEDURE — 83550 IRON BINDING TEST: CPT

## 2019-10-12 RX ORDER — SPIRONOLACTONE 25 MG/1
1 TABLET, FILM COATED ORAL
Qty: 30 | Refills: 0
Start: 2019-10-12 | End: 2019-11-10

## 2019-10-12 RX ORDER — POTASSIUM CHLORIDE 20 MEQ
1 PACKET (EA) ORAL
Qty: 0 | Refills: 0 | DISCHARGE

## 2019-10-12 RX ORDER — POTASSIUM CHLORIDE 20 MEQ
40 PACKET (EA) ORAL ONCE
Refills: 0 | Status: COMPLETED | OUTPATIENT
Start: 2019-10-12 | End: 2019-10-12

## 2019-10-12 RX ORDER — LACTULOSE 10 G/15ML
30 SOLUTION ORAL
Refills: 0 | Status: DISCONTINUED | OUTPATIENT
Start: 2019-10-12 | End: 2019-10-12

## 2019-10-12 RX ADMIN — GABAPENTIN 200 MILLIGRAM(S): 400 CAPSULE ORAL at 06:21

## 2019-10-12 RX ADMIN — Medication 40 MILLIGRAM(S): at 06:21

## 2019-10-12 RX ADMIN — GABAPENTIN 200 MILLIGRAM(S): 400 CAPSULE ORAL at 12:05

## 2019-10-12 RX ADMIN — GABAPENTIN 200 MILLIGRAM(S): 400 CAPSULE ORAL at 00:20

## 2019-10-12 RX ADMIN — LACTULOSE 30 GRAM(S): 10 SOLUTION ORAL at 12:07

## 2019-10-12 RX ADMIN — POLYETHYLENE GLYCOL 3350 17 GRAM(S): 17 POWDER, FOR SOLUTION ORAL at 06:21

## 2019-10-12 RX ADMIN — LACTULOSE 45 GRAM(S): 10 SOLUTION ORAL at 00:22

## 2019-10-12 RX ADMIN — URSODIOL 300 MILLIGRAM(S): 250 TABLET, FILM COATED ORAL at 06:21

## 2019-10-12 RX ADMIN — SIMETHICONE 80 MILLIGRAM(S): 80 TABLET, CHEWABLE ORAL at 06:20

## 2019-10-12 RX ADMIN — LACTULOSE 45 GRAM(S): 10 SOLUTION ORAL at 06:25

## 2019-10-12 RX ADMIN — SPIRONOLACTONE 25 MILLIGRAM(S): 25 TABLET, FILM COATED ORAL at 06:21

## 2019-10-12 RX ADMIN — Medication 1 GRAM(S): at 12:05

## 2019-10-12 RX ADMIN — Medication 1 GRAM(S): at 00:20

## 2019-10-12 RX ADMIN — PANTOPRAZOLE SODIUM 40 MILLIGRAM(S): 20 TABLET, DELAYED RELEASE ORAL at 06:20

## 2019-10-12 RX ADMIN — Medication 40 MILLIEQUIVALENT(S): at 12:06

## 2019-10-12 RX ADMIN — Medication 2 DROP(S): at 06:25

## 2019-10-12 RX ADMIN — Medication 1000 UNIT(S): at 12:05

## 2019-10-12 RX ADMIN — Medication 1 GRAM(S): at 06:21

## 2019-10-12 NOTE — PROGRESS NOTE ADULT - PROBLEM SELECTOR PLAN 2
2/2 JEAN; c/b ascites; see above  sp egd wo EV  f/u am labs  cont ppi bid  cont sandra tid  cont rifaximin bid  dec lactulose dosing titrate to 4 soft bms/day  cont diuretics, monitor renal fxn  trend lfts; avoid hepatotoxins  op gi f/u upon dc; pt has appt end of october

## 2019-10-12 NOTE — PROGRESS NOTE ADULT - ASSESSMENT
62 y/o man w nonEtoh cirrhosis, adm w decompensation incr danilo edema/abdomen girth, worse anemia, endoscopy found w GAVE. edema much improved w diuretics, H/H improved after transfusion    -Anemia- multifactorial, due to chronic disease/acute illness/acute hepative decompensation/GI bleed, responsive to tx, not sympotmatic, will follow in office to continue management   -Thrombocytopenia-due to cirrhosis, splenomegaly, stable, adequate at present level, no acute intervention needed    stable from Heme standpoint for planned discharge, will follow up in office, discussed w pt

## 2019-10-12 NOTE — PROGRESS NOTE ADULT - PROBLEM SELECTOR PLAN 1
Secondary to anasarca  As per IR, there is not a significant amount of ascites to drain  Had diagnostic paracentesis -- no evidence of SBP  Change to po lasix  Continue spironolactone 25mg qd  Daily weights   GI consult noted

## 2019-10-12 NOTE — PROGRESS NOTE ADULT - REASON FOR ADMISSION
increased weight/water retention

## 2019-10-12 NOTE — PROGRESS NOTE ADULT - SUBJECTIVE AND OBJECTIVE BOX
[facility] -- Sushila Dhaliwal, Morenita Camacho, Clay Antoine Savella  Office # [phone]      Follow Up:  Preop/Post op f/u; LE edema    Subjective/Observations: Seen and examined.  Feeling great, lying flat in bed.  Tolerating clears.  Denies any bleeding episodes, cp, sob or palpitations.  No signs of orthopnea or PND.  NAD.  No events overnight.      REVIEW OF SYSTEMS: All other review of systems is negative unless indicated above    PAST MEDICAL & SURGICAL HISTORY:  GIB (gastrointestinal bleeding)  GERD with esophagitis: Gastritis &amp; Non Bleeding Ulcers  Hepatic encephalopathy  Obesity  Fatty liver disease, nonalcoholic  Renal stones: 25 years ago  Hypertension  Neuropathy  Hypercholesteremia  Diabetes  S/P cholecystectomy      MEDICATIONS  (STANDING):  cholecalciferol 1000 Unit(s) Oral daily  dextrose 5%. 1000 milliLiter(s) (50 mL/Hr) IV Continuous <Continuous>  dextrose 50% Injectable 12.5 Gram(s) IV Push once  dextrose 50% Injectable 25 Gram(s) IV Push once  dextrose 50% Injectable 25 Gram(s) IV Push once  furosemide   Injectable 20 milliGRAM(s) IV Push once  furosemide   Injectable 40 milliGRAM(s) IV Push every 12 hours  gabapentin 200 milliGRAM(s) Oral four times a day  hydrocortisone/polymyxin/neomycin Solution 2 Drop(s) Right Ear every 8 hours  insulin lispro (HumaLOG) corrective regimen sliding scale   SubCutaneous three times a day before meals  insulin lispro (HumaLOG) corrective regimen sliding scale   SubCutaneous at bedtime  lactulose Syrup 30 Gram(s) Oral four times a day  lidocaine   Patch 1 Patch Transdermal daily  pantoprazole    Tablet 40 milliGRAM(s) Oral two times a day  polyethylene glycol 3350 17 Gram(s) Oral two times a day  rifaximin 550 milliGRAM(s) Oral two times a day  simethicone 80 milliGRAM(s) Chew three times a day  spironolactone 25 milliGRAM(s) Oral daily  sucralfate 1 Gram(s) Oral every 6 hours  ursodiol Capsule 300 milliGRAM(s) Oral three times a day    MEDICATIONS  (PRN):  dextrose 40% Gel 15 Gram(s) Oral once PRN Blood Glucose LESS THAN 70 milliGRAM(s)/deciLiter  glucagon  Injectable 1 milliGRAM(s) IntraMuscular once PRN Glucose <70 milliGRAM(s)/deciLiter      Allergies    codeine (Anaphylaxis)    Intolerances            Vital Signs Last 24 Hrs  T(C): 36.4 (12 Oct 2019 05:27), Max: 36.9 (11 Oct 2019 18:40)  T(F): 97.6 (12 Oct 2019 05:27), Max: 98.4 (11 Oct 2019 18:40)  HR: 82 (12 Oct 2019 05:27) (82 - 91)  BP: 111/66 (12 Oct 2019 05:27) (99/62 - 121/70)  BP(mean): --  RR: 17 (12 Oct 2019 05:27) (17 - 19)  SpO2: 95% (12 Oct 2019 05:27) (93% - 96%)    I&O's Summary    11 Oct 2019 07:01  -  12 Oct 2019 07:00  --------------------------------------------------------  IN: 100 mL / OUT: 0 mL / NET: 100 mL          PHYSICAL EXAM:  TELE: Not on tele  Constitutional: NAD, awake and alert, well-developed, Obese  HEENT: Moist Mucous Membranes, Anicteric  Pulmonary: Non-labored, breath sounds are clear bilaterally, No wheezing, rales or rhonchi  Cardiovascular: Regular, S1 and S2, No murmurs, rubs, gallops or clicks  Gastrointestinal: Bowel Sounds present, soft, nontender. Obese abdomen  Lymph: +2 bilateral lower extremity edema. No lymphadenopathy.  Skin: No visible rashes or ulcers.  Psych:  Mood & affect appropriate    LABS: All Labs Reviewed:                        7.9    4.19  )-----------( 89       ( 11 Oct 2019 09:02 )             24.1                         7.1    3.74  )-----------( 92       ( 10 Oct 2019 08:29 )             21.5                         7.8    3.58  )-----------( 82       ( 09 Oct 2019 09:13 )             23.1     11 Oct 2019 09:02    141    |  105    |  15     ----------------------------<  93     3.4     |  30     |  1.40   10 Oct 2019 08:29    141    |  106    |  16     ----------------------------<  130    3.4     |  28     |  1.40   09 Oct 2019 09:13    140    |  105    |  15     ----------------------------<  106    3.6     |  26     |  1.20     Ca    8.0        11 Oct 2019 09:02  Ca    8.1        10 Oct 2019 08:29  Ca    8.1        09 Oct 2019 09:13  Mg     1.7       11 Oct 2019 09:02  Mg     1.7       09 Oct 2019 09:13    TPro  5.4    /  Alb  2.1    /  TBili  4.9    /  DBili  3.30   /  AST  43     /  ALT  28     /  AlkPhos  171    11 Oct 2019 09:02  TPro  5.2    /  Alb  1.9    /  TBili  4.1    /  DBili  2.80   /  AST  36     /  ALT  24     /  AlkPhos  175    10 Oct 2019 08:29  TPro  5.5    /  Alb  2.0    /  TBili  4.3    /  DBili  3.00   /  AST  44     /  ALT  27     /  AlkPhos  184    09 Oct 2019 09:13        < from: 12 Lead ECG (10.08.19 @ 18:43) >  Ventricular Rate 92 BPM    Atrial Rate 92 BPM    P-R Interval 200 ms    QRS Duration 96 ms    Q-T Interval 408 ms    QTC Calculation(Bezet) 504 ms    P Axis 39 degrees    R Axis 8 degrees    T Axis 17 degrees    Diagnosis Line Normal sinus rhythm  Low voltage QRS  Cannot rule out Inferior infarct (cited on or before 16-APR-2019)  Prolonged QT    Confirmed by DIONRAH ANTOINE ([id number]) on 10/9/2019 9:23:42 AM    < end of copied text >    < from: TTE Echo Doppler w/o Cont (10.11.19 @ 08:16) >   EXAM:  ECHO TTE WO CON COMP W DOPPLR         PROCEDURE DATE:  10/11/2019        INTERPRETATION:  INDICATION: Shortness of breath    Blood Pressure 106/50    Height 182.8     Weight 163       BSA 2.73    Dimensions:    LA 4.2       Normal Values: 2.0 - 4.0 cm    Ao 3.8        Normal Values: 2.0 - 3.8 cm  SEPTUM 1.0       Normal Values: 0.6 - 1.2 cm  PWT 1.2       Normal Values: 0.6 - 1.1 cm  LVIDd 5.4         Normal Values: 3.0 - 5.6 cm  LVIDs 3.5         Normal Values: 1.8 - 4.0 cm    Derived Variables:  LVMI g/m2  RWT  Fractional Short  Ejection Fraction 65    Doppler Peak v. AoV= (m/sec)    OBSERVATIONS:    Mitral Valve: normal, trace physiologic MR.  Aortic Valve/Aorta: Calcified trileaflet aortic valve, without stenosis.  Tricuspid Valve: normal with trace TR.  Pulmonic Valve: normal  Left Atrium: Enlarged  Right Atrium: normal  Left Ventricle: Borderline concentric LVH with normal systolic function,   estimated LVEF of 65%.  Right Ventricle: normal size and systolic function.  Pericardium/Pleura: no significant pleural effusion, no significant   pericardial effusion.  Pulmonary/RV Pressure: Inadequate TR jet to estimate PA systolic   pressure.   LV Diastolic Function: Grade 1 diastolic dysfunction.     Technically limited study. As visualized, there is borderline concentric   LVH with overall normal systolic function, estimated LVEF of 65%. Normal   right ventricular size and systolic function. Grade 1 diastolic   dysfunction. Left atrial enlargement. The aortic root is normal in size.   The mitral valve is structurally normal, trace physiologic MR. The aortic   valve is calcified without stenosis or insufficiency. Trace physiologic   TR. Inadequate TR jet to estimate PA systolic pressure. No significant   pericardial effusion.                  CHAUNCEY LOYA M.D., ATTENDING CARDIOLOGIST    < end of copied text >    < from: TTE Echo Doppler w/o Cont (10.11.19 @ 08:16) >   EXAM:  ECHO TTE WO CON COMP W DOPPLR         PROCEDURE DATE:  10/11/2019        INTERPRETATION:  INDICATION: Shortness of breath    Blood Pressure 106/50    Height 182.8     Weight 163       BSA 2.73    Dimensions:    LA 4.2       Normal Values: 2.0 - 4.0 cm    Ao 3.8        Normal Values: 2.0 - 3.8 cm  SEPTUM 1.0       Normal Values: 0.6 - 1.2 cm  PWT 1.2       Normal Values: 0.6 - 1.1 cm  LVIDd 5.4         Normal Values: 3.0 - 5.6 cm  LVIDs 3.5         Normal Values: 1.8 - 4.0 cm    Derived Variables:  LVMI g/m2  RWT  Fractional Short  Ejection Fraction 65    Doppler Peak v. AoV= (m/sec)    OBSERVATIONS:    Mitral Valve: normal, trace physiologic MR.  Aortic Valve/Aorta: Calcified trileaflet aortic valve, without stenosis.  Tricuspid Valve: normal with trace TR.  Pulmonic Valve: normal  Left Atrium: Enlarged  Right Atrium: normal  Left Ventricle: Borderline concentric LVH with normal systolic function,   estimated LVEF of 65%.  Right Ventricle: normal size and systolic function.  Pericardium/Pleura: no significant pleural effusion, no significant   pericardial effusion.  Pulmonary/RV Pressure: Inadequate TR jet to estimate PA systolic   pressure.   LV Diastolic Function: Grade 1 diastolic dysfunction.     Technically limited study. As visualized, there is borderline concentric   LVH with overall normal systolic function, estimated LVEF of 65%. Normal   right ventricular size and systolic function. Grade 1 diastolic   dysfunction. Left atrial enlargement. The aortic root is normal in size.   The mitral valve is structurally normal, trace physiologic MR. The aortic   valve is calcified without stenosis or insufficiency. Trace physiologic   TR. Inadequate TR jet to estimate PA systolic pressure. No significant   pericardial effusion.                  CHAUNCEY LOYA M.D., ATTENDING CARDIOLOGIST  This document has been electronically signed. Oct 11 2019  2:01PM              < end of copied text >

## 2019-10-12 NOTE — PROGRESS NOTE ADULT - SUBJECTIVE AND OBJECTIVE BOX
Patient is a 63y old  Male who presents with a chief complaint of increased weight/water retention (12 Oct 2019 07:58)      INTERVAL HPI/OVERNIGHT EVENTS: Patient seen and examined. NAD. No complaints.    Vital Signs Last 24 Hrs  T(C): 36.4 (12 Oct 2019 05:27), Max: 36.9 (11 Oct 2019 18:40)  T(F): 97.6 (12 Oct 2019 05:27), Max: 98.4 (11 Oct 2019 18:40)  HR: 82 (12 Oct 2019 05:27) (82 - 91)  BP: 111/66 (12 Oct 2019 05:27) (99/62 - 121/70)  BP(mean): --  RR: 17 (12 Oct 2019 05:27) (17 - 19)  SpO2: 95% (12 Oct 2019 05:27) (93% - 96%)    10-12    140  |  104  |  14  ----------------------------<  82  3.3<L>   |  31  |  1.30    Ca    8.3<L>      12 Oct 2019 08:07  Mg     1.7     10-12    TPro  5.4<L>  /  Alb  2.1<L>  /  TBili  4.9<H>  /  DBili  3.30<H>  /  AST  43<H>  /  ALT  28  /  AlkPhos  171<H>  10-11                          8.6    4.33  )-----------( 86       ( 12 Oct 2019 08:07 )             25.9       CAPILLARY BLOOD GLUCOSE      POCT Blood Glucose.: 81 mg/dL (12 Oct 2019 07:17)  POCT Blood Glucose.: 101 mg/dL (11 Oct 2019 21:27)  POCT Blood Glucose.: 238 mg/dL (11 Oct 2019 16:55)  POCT Blood Glucose.: 95 mg/dL (11 Oct 2019 11:50)              cholecalciferol 1000 Unit(s) Oral daily  dextrose 40% Gel 15 Gram(s) Oral once PRN  dextrose 5%. 1000 milliLiter(s) IV Continuous <Continuous>  dextrose 50% Injectable 12.5 Gram(s) IV Push once  dextrose 50% Injectable 25 Gram(s) IV Push once  dextrose 50% Injectable 25 Gram(s) IV Push once  furosemide   Injectable 20 milliGRAM(s) IV Push once  furosemide   Injectable 40 milliGRAM(s) IV Push every 12 hours  gabapentin 200 milliGRAM(s) Oral four times a day  glucagon  Injectable 1 milliGRAM(s) IntraMuscular once PRN  hydrocortisone/polymyxin/neomycin Solution 2 Drop(s) Right Ear every 8 hours  insulin lispro (HumaLOG) corrective regimen sliding scale   SubCutaneous three times a day before meals  insulin lispro (HumaLOG) corrective regimen sliding scale   SubCutaneous at bedtime  lactulose Syrup 30 Gram(s) Oral four times a day  lidocaine   Patch 1 Patch Transdermal daily  pantoprazole    Tablet 40 milliGRAM(s) Oral two times a day  polyethylene glycol 3350 17 Gram(s) Oral two times a day  potassium chloride    Tablet ER 40 milliEquivalent(s) Oral once  rifaximin 550 milliGRAM(s) Oral two times a day  simethicone 80 milliGRAM(s) Chew three times a day  spironolactone 25 milliGRAM(s) Oral daily  sucralfate 1 Gram(s) Oral every 6 hours  ursodiol Capsule 300 milliGRAM(s) Oral three times a day              REVIEW OF SYSTEMS:  CONSTITUTIONAL: No fever, no weight loss, or no fatigue  NECK: No pain, no stiffness  RESPIRATORY: No cough, no wheezing, no chills, no hemoptysis, No shortness of breath  CARDIOVASCULAR: No chest pain, no palpitations, no dizziness, no leg swelling  GASTROINTESTINAL: No abdominal pain. No nausea, no vomiting, no hematemesis; No diarrhea, no constipation. No melena, no hematochezia.  GENITOURINARY: No dysuria, no frequency, no hematuria, no incontinence  NEUROLOGICAL: No headaches, no loss of strength, no numbness, no tremors  SKIN: No itching, no burning  MUSCULOSKELETAL: No joint pain, no swelling; No muscle, no back, no extremity pain  PSYCHIATRIC: No depression, no mood swings,   HEME/LYMPH: No easy bruising, no bleeding gums  ALLERY AND IMMUNOLOGIC: No hives       Consultant(s) Notes Reviewed:  [X] YES  [ ] NO    PHYSICAL EXAM:  GENERAL: NAD  HEAD:  Atraumatic, Normocephalic  EYES: EOMI, PERRLA, conjunctiva and sclera clear  ENMT: No tonsillar erythema, exudates, or enlargement; Moist mucous membranes  NECK: Supple, No JVD  NERVOUS SYSTEM:  Awake & alert  CHEST/LUNG: Clear to auscultation bilaterally; No rales, rhonchi, wheezing,  HEART: Regular rate and rhythm  ABDOMEN: Soft, Nontender, Nondistended; Bowel sounds present  EXTREMITIES:  No clubbing, cyanosis, or edema  LYMPH: No lymphadenopathy noted  SKIN: No rashes      Advanced care planning discussed with patient/family [X] YES   [ ] NO    Advanced care planning discussed with patient/family. Advanced care planning forms reviewed/discussed/completed. 20 minutes spent.

## 2019-10-12 NOTE — DISCHARGE NOTE PROVIDER - NSDCCPCAREPLAN_GEN_ALL_CORE_FT
PRINCIPAL DISCHARGE DIAGNOSIS  Diagnosis: Anasarca  Assessment and Plan of Treatment: Continue current medications  Follow-up with your primary care doctor within 1 week.  Check basic metabolic panel next week      SECONDARY DISCHARGE DIAGNOSES  Diagnosis: GIB (gastrointestinal bleeding)  Assessment and Plan of Treatment: Continue current medications  Follow-up with your primary care doctor within 1 week.   Check CBC next week    Diagnosis: Cirrhosis  Assessment and Plan of Treatment: Follow-up with Dr Morales within 1 month

## 2019-10-12 NOTE — PROGRESS NOTE ADULT - PROBLEM SELECTOR PLAN 1
improved; in setting of cirrhosis  sp dx para; cx/cyto neg  cont diuresis  routine echo per cards  monitor exam and renal fxn  low na/low pro diet improved; in setting of cirrhosis  sp dx para; cx/cyto neg  cont diuresis  echo- ef 65% grade 1 diastolic dysfunction  monitor exam and renal fxn  low na/low pro diet

## 2019-10-12 NOTE — PROGRESS NOTE ADULT - PROBLEM SELECTOR PLAN 3
Multifactorial  OB + stool  Heme consult noted  S/P 2 unit PRBC   S/P EGD: mild/mod GAVE in antrum, sp apc, duodenal avm, sp apc  Monitor h/h as o/p  Continue PPI

## 2019-10-12 NOTE — DISCHARGE NOTE PROVIDER - PROVIDER TOKENS
FREE:[LAST:[ramez],FIRST:[ruddy],PHONE:[(   )    -],FAX:[(   )    -],FOLLOWUP:[1 week]],FREE:[LAST:[Ramez],FIRST:[Ruddy],PHONE:[(   )    -],FAX:[(   )    -],FOLLOWUP:[1 week]],PROVIDER:[TOKEN:[[id number]],FOLLOWUP:[1 month]]

## 2019-10-12 NOTE — PROGRESS NOTE ADULT - SUBJECTIVE AND OBJECTIVE BOX
DEPARTMENT OF ANESTHESIA  POST ANESTHETIC EVALUATION    The Patient was interviewed and evaluated.  The patient is S/P a endoscopic procedure    Vital Signs Last 24 Hrs  T(C): 36.4 (12 Oct 2019 05:27), Max: 36.9 (11 Oct 2019 18:40)  T(F): 97.6 (12 Oct 2019 05:27), Max: 98.4 (11 Oct 2019 18:40)  HR: 82 (12 Oct 2019 05:27) (82 - 91)  BP: 111/66 (12 Oct 2019 05:27) (99/62 - 121/70)  BP(mean): --  RR: 17 (12 Oct 2019 05:27) (17 - 19)  SpO2: 95% (12 Oct 2019 05:27) (93% - 96%)    Evaluation:  The patient is doing well     (x ) No apparent complications or complaints regarding anesthesia care at this time  (x ) All questions were answered    Condition:  (x ) Stable      ( ) Guarded      ( ) Critical    Recommendations:  (x None     ( ) Other:

## 2019-10-12 NOTE — PROGRESS NOTE ADULT - SUBJECTIVE AND OBJECTIVE BOX
All interim records and events noted.    post tx PRBC and endoscopy, tolerated well  feeling much improved w diuretics, sig decr in danilo edema and abdom girth      MEDICATIONS  (STANDING):  cholecalciferol 1000 Unit(s) Oral daily  dextrose 5%. 1000 milliLiter(s) (50 mL/Hr) IV Continuous <Continuous>  dextrose 50% Injectable 12.5 Gram(s) IV Push once  dextrose 50% Injectable 25 Gram(s) IV Push once  dextrose 50% Injectable 25 Gram(s) IV Push once  furosemide   Injectable 20 milliGRAM(s) IV Push once  furosemide   Injectable 40 milliGRAM(s) IV Push every 12 hours  gabapentin 200 milliGRAM(s) Oral four times a day  hydrocortisone/polymyxin/neomycin Solution 2 Drop(s) Right Ear every 8 hours  insulin lispro (HumaLOG) corrective regimen sliding scale   SubCutaneous three times a day before meals  insulin lispro (HumaLOG) corrective regimen sliding scale   SubCutaneous at bedtime  lactulose Syrup 30 Gram(s) Oral four times a day  lidocaine   Patch 1 Patch Transdermal daily  pantoprazole    Tablet 40 milliGRAM(s) Oral two times a day  polyethylene glycol 3350 17 Gram(s) Oral two times a day  potassium chloride    Tablet ER 40 milliEquivalent(s) Oral once  rifaximin 550 milliGRAM(s) Oral two times a day  simethicone 80 milliGRAM(s) Chew three times a day  spironolactone 25 milliGRAM(s) Oral daily  sucralfate 1 Gram(s) Oral every 6 hours  ursodiol Capsule 300 milliGRAM(s) Oral three times a day    MEDICATIONS  (PRN):  dextrose 40% Gel 15 Gram(s) Oral once PRN Blood Glucose LESS THAN 70 milliGRAM(s)/deciLiter  glucagon  Injectable 1 milliGRAM(s) IntraMuscular once PRN Glucose <70 milliGRAM(s)/deciLiter      Vital Signs Last 24 Hrs  T(C): 36.4 (12 Oct 2019 05:27), Max: 36.9 (11 Oct 2019 18:40)  T(F): 97.6 (12 Oct 2019 05:27), Max: 98.4 (11 Oct 2019 18:40)  HR: 82 (12 Oct 2019 05:27) (82 - 91)  BP: 111/66 (12 Oct 2019 05:27) (99/62 - 121/70)  BP(mean): --  RR: 17 (12 Oct 2019 05:27) (17 - 19)  SpO2: 95% (12 Oct 2019 05:27) (93% - 96%)    PHYSICAL EXAM  General: well developed  well nourished, in no acute distress  Head: atraumatic, normocephalic  ENT: sclera anicteric, buccal mucosa moist  Neck: supple, trachea midline  CV: S1 S2, regular rate and rhythm  Lungs: clear to auscultation, no wheezes/rhonchi  Abdomen: distended but not tense, nontender, bowel sounds present, no palpable masses  Extrem: adriana legs trace-1+ edema  Skin: no significant increased ecchymosis/petechiae  Neuro: alert and oriented X3,  no focal deficits      LABS:             8.6    4.33  )-----------( 86       ( 10-12 @ 08:07 )             25.9                7.9    4.19  )-----------( 89       ( 10-11 @ 09:02 )             24.1                7.1    3.74  )-----------( 92       ( 10-10 @ 08:29 )             21.5       10-12    140  |  104  |  14  ----------------------------<  82  3.3<L>   |  31  |  1.30    Ca    8.3<L>      12 Oct 2019 08:07  Mg     1.7     10-12    TPro  5.4<L>  /  Alb  2.1<L>  /  TBili  4.9<H>  /  DBili  3.30<H>  /  AST  43<H>  /  ALT  28  /  AlkPhos  171<H>  10-11    10-08 @ 18:21  PT20.7 INR1.78  PTT35.2      RADIOLOGY & ADDITIONAL STUDIES:    IMPRESSION/RECOMMENDATIONS:

## 2019-10-12 NOTE — PROGRESS NOTE ADULT - PROBLEM SELECTOR PLAN 3
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

## 2019-10-12 NOTE — PROGRESS NOTE ADULT - ASSESSMENT
63 year old male with non-alcoholic cirrhosis, who presents with significant volume overload and anemia. Called to evaluate for cardiac clearance prior to EGD    Diastolic HF   -no sign of acute ischemia  -Hypervolemic on exam with bilateral LE edema, continue with 40mg IV lasix BID with strict intake and output, daily renal profile,  -negative 1060 cc 24 hours with -2.5 L total (no record over past 24 hours) , no orthopnea laying flat in no distress   -TTE normal LV systolic function EF 65%; LVH, DD Grade I    Anemia/+FOBT  - s/p PRBC x 1 yesterday with Lasix 20mg IV ordered but not given.  No signs of overload.   - transfuse to keep Hb> 8.  Hg 7.9 <--7.1  mild rise in hg post tx am labs pending  - Monitor routine hemodynamics - bp soft at times - asymptomatic    - Watch creatinine and electrolytes. Keep K>4, mg>2  - All other workup as per primary team  - Will follow with you. Further cardiac w/u as indicated by clinical course     DUSTIN Gaston-BC  Cadiology 63 year old male with non-alcoholic cirrhosis, who presents with significant volume overload and anemia. Called to evaluate for cardiac clearance prior to EGD    Diastolic HF   -no sign of acute ischemia  -Hypervolemic on exam with bilateral LE edema, continue with 40mg IV lasix BID with strict intake and output, daily renal profile,  -negative 1060 cc 24 hours with -2.5 L total (no record over past 24 hours) , no orthopnea laying flat in no distress   -TTE normal LV systolic function EF 65%; LVH, DD Grade I    Anemia/+FOBT  - s/p PRBC x 1 yesterday with Lasix 20mg IV ordered but not given.  No signs of overload.   - transfuse to keep Hb> 8.  Hg 7.9 <--7.1  mild rise in hg post tx am labs pending  - Monitor routine hemodynamics - bp soft at times - asymptomatic    - Watch creatinine and electrolytes. Keep K>4, mg>2  - All other workup and dc planning as per primary team  - Will follow with you. Further cardiac w/u as indicated by clinical course     DUSTIN Gaston-BC  Cadiology

## 2019-10-12 NOTE — PROGRESS NOTE ADULT - SUBJECTIVE AND OBJECTIVE BOX
INTERVAL HPI/OVERNIGHT EVENTS:  pt seen and examined  sp egd yesterday  denies n/v/abd pain  passing brown bms  feels hungry  am labs pending  sp 1u prbc yesterday  no s/s active gib    MEDICATIONS  (STANDING):  cholecalciferol 1000 Unit(s) Oral daily  dextrose 5%. 1000 milliLiter(s) (50 mL/Hr) IV Continuous <Continuous>  dextrose 50% Injectable 12.5 Gram(s) IV Push once  dextrose 50% Injectable 25 Gram(s) IV Push once  dextrose 50% Injectable 25 Gram(s) IV Push once  furosemide   Injectable 20 milliGRAM(s) IV Push once  furosemide   Injectable 40 milliGRAM(s) IV Push every 12 hours  gabapentin 200 milliGRAM(s) Oral four times a day  hydrocortisone/polymyxin/neomycin Solution 2 Drop(s) Right Ear every 8 hours  insulin lispro (HumaLOG) corrective regimen sliding scale   SubCutaneous three times a day before meals  insulin lispro (HumaLOG) corrective regimen sliding scale   SubCutaneous at bedtime  lactulose Syrup 45 Gram(s) Oral four times a day  lidocaine   Patch 1 Patch Transdermal daily  pantoprazole    Tablet 40 milliGRAM(s) Oral two times a day  polyethylene glycol 3350 17 Gram(s) Oral two times a day  rifaximin 550 milliGRAM(s) Oral two times a day  simethicone 80 milliGRAM(s) Chew three times a day  spironolactone 25 milliGRAM(s) Oral daily  sucralfate 1 Gram(s) Oral every 6 hours  ursodiol Capsule 300 milliGRAM(s) Oral three times a day    MEDICATIONS  (PRN):  dextrose 40% Gel 15 Gram(s) Oral once PRN Blood Glucose LESS THAN 70 milliGRAM(s)/deciLiter  glucagon  Injectable 1 milliGRAM(s) IntraMuscular once PRN Glucose <70 milliGRAM(s)/deciLiter      Allergies    codeine (Anaphylaxis)    Intolerances        Review of Systems:    General:  No wt loss, fevers, chills, night sweats, fatigue   Eyes:  Good vision, no reported pain  ENT:  No sore throat, pain, runny nose, dysphagia  CV:  No pain, palpitations, hypo/hypertension  Resp:  No dyspnea, cough, tachypnea, wheezing  GI:  No pain, No nausea, No vomiting, No diarrhea, No constipation, No weight loss, No fever, No pruritis, No rectal bleeding, No melena, No dysphagia  :  No pain, bleeding, incontinence, nocturia  Muscle:  No pain, weakness  Neuro:  No weakness, tingling, memory problems  Psych:  No fatigue, insomnia, mood problems, depression  Endocrine:  No polyuria, polydypsia, cold/heat intolerance  Heme:  No petechiae, ecchymosis, easy bruisability  Skin:  No rash, tattoos, scars, edema      Vital Signs Last 24 Hrs  T(C): 36.4 (12 Oct 2019 05:27), Max: 36.9 (11 Oct 2019 18:40)  T(F): 97.6 (12 Oct 2019 05:27), Max: 98.4 (11 Oct 2019 18:40)  HR: 82 (12 Oct 2019 05:27) (82 - 91)  BP: 111/66 (12 Oct 2019 05:27) (99/62 - 121/70)  BP(mean): --  RR: 17 (12 Oct 2019 05:27) (17 - 19)  SpO2: 95% (12 Oct 2019 05:27) (93% - 96%)    PHYSICAL EXAM:    General:  nad lying in bed  HEENT:  NC/AT,  mild scleral icterus  Chest:  dec bs  Cardiovascular:  Regular rhythm, S1, S2  Abdomen:  Soft, nt +dt  Extremities:  +edema  Skin:  No rash  Neuro/Psych:  awake alert appropriate      LABS:                        7.9    4.19  )-----------( 89       ( 11 Oct 2019 09:02 )             24.1     10-11    141  |  105  |  15  ----------------------------<  93  3.4<L>   |  30  |  1.40<H>    Ca    8.0<L>      11 Oct 2019 09:02  Mg     1.7     10-11    TPro  5.4<L>  /  Alb  2.1<L>  /  TBili  4.9<H>  /  DBili  3.30<H>  /  AST  43<H>  /  ALT  28  /  AlkPhos  171<H>  10-11          RADIOLOGY & ADDITIONAL TESTS:

## 2019-10-12 NOTE — PROGRESS NOTE ADULT - ATTENDING COMMENTS
The patient was personally seen and examined, in addition to being examined and evaluated by NP.  All elements of the note were edited where appropriate.
The patient was personally seen and examined, in addition to being examined and evaluated by NP.  All elements of the note were edited where appropriate.
Advanced care planning was discussed with patient and family.  Advanced care planning forms were reviewed and discussed.  Risks, benefits and alternatives of gastroenterologic procedures were discussed in detail and all questions were answered.    30 minutes spent.
Advanced care planning was discussed with patient and family.  Advanced care planning forms were reviewed and discussed.  Risks, benefits and alternatives of gastroenterologic procedures were discussed in detail and all questions were answered.    30 minutes spent.
D/C home

## 2019-10-12 NOTE — DISCHARGE NOTE PROVIDER - CARE PROVIDER_API CALL
ruddy myrick  Phone: (   )    -  Fax: (   )    -  Follow Up Time: 1 week    Ruddy Myrick  Phone: (   )    -  Fax: (   )    -  Follow Up Time: 1 week    Javy Morales (DO)  Internal Medicine  [address] [place] [id number]  Phone: [phone]  Fax: [phone]  Follow Up Time: 1 month

## 2019-10-12 NOTE — DISCHARGE NOTE NURSING/CASE MANAGEMENT/SOCIAL WORK - PATIENT PORTAL LINK FT
You can access the FollowMyHealth Patient Portal offered by [facility] by registering at the following website: [email or web address]. By joining [facility]’s FollowMyHealth portal, you will also be able to view your health information using other applications (apps) compatible with our system.

## 2019-10-12 NOTE — DISCHARGE NOTE PROVIDER - HOSPITAL COURSE
63 male with non-alcoholic cirrhosis, GERD, GIB, hepatic encephalopathy presents with states for 2 months has gained "a lot of weight" -- 50# in his abdomen and bilateral le, abdomen is distended, bilateral le swelling, pitting edema,  states he has hx of non alcoholic liver cirrhosis,  seen by his PMD Dr Satish Coto today sent for admission. States seen by his urologist on Thursday last week,  cannot recall his name.  GI Dr Morales.  nonsmoker, denies etoh.  denies chest pain, sob, fever, nv, diarrhea.  States he has been constipated for 3 days, and has decreased urine output. Dark stool.        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

## 2019-10-12 NOTE — PROGRESS NOTE ADULT - PROBLEM SELECTOR PROBLEM 1
Type 2 diabetes mellitus with other specified complication, with long-term current use of insulin
Abdominal distension

## 2019-10-14 LAB
CULTURE RESULTS: SIGNIFICANT CHANGE UP
SPECIMEN SOURCE: SIGNIFICANT CHANGE UP

## 2019-10-22 ENCOUNTER — INPATIENT (INPATIENT)
Facility: HOSPITAL | Age: 64
LOS: 16 days | Discharge: NURSING, REHAB OR LONG-TERM CARE | DRG: 871 | End: 2019-11-08
Attending: GENERAL ACUTE CARE HOSPITAL | Admitting: INTERNAL MEDICINE
Payer: MEDICARE

## 2019-10-22 VITALS
RESPIRATION RATE: 25 BRPM | SYSTOLIC BLOOD PRESSURE: 103 MMHG | HEART RATE: 127 BPM | DIASTOLIC BLOOD PRESSURE: 67 MMHG | TEMPERATURE: 103 F | HEIGHT: 72 IN | OXYGEN SATURATION: 94 % | WEIGHT: 300.05 LBS

## 2019-10-22 DIAGNOSIS — Z90.49 ACQUIRED ABSENCE OF OTHER SPECIFIED PARTS OF DIGESTIVE TRACT: Chronic | ICD-10-CM

## 2019-10-22 DIAGNOSIS — R41.82 ALTERED MENTAL STATUS, UNSPECIFIED: ICD-10-CM

## 2019-10-22 LAB
ALBUMIN SERPL ELPH-MCNC: 2.3 G/DL — LOW (ref 3.3–5.2)
ALP SERPL-CCNC: 157 U/L — HIGH (ref 40–120)
ALT FLD-CCNC: 24 U/L — SIGNIFICANT CHANGE UP
AMMONIA BLD-MCNC: 53 UMOL/L — SIGNIFICANT CHANGE UP (ref 11–55)
ANION GAP SERPL CALC-SCNC: 14 MMOL/L — SIGNIFICANT CHANGE UP (ref 5–17)
APPEARANCE UR: CLEAR — SIGNIFICANT CHANGE UP
APTT BLD: 25 SEC — LOW (ref 27.5–36.3)
AST SERPL-CCNC: 45 U/L — HIGH
B PERT IGG+IGM PNL SER: ABNORMAL
BASOPHILS # BLD AUTO: 0 K/UL — SIGNIFICANT CHANGE UP (ref 0–0.2)
BASOPHILS NFR BLD AUTO: 0 % — SIGNIFICANT CHANGE UP (ref 0–2)
BILIRUB SERPL-MCNC: 5.4 MG/DL — HIGH (ref 0.4–2)
BILIRUB UR-MCNC: ABNORMAL
BUN SERPL-MCNC: 17 MG/DL — SIGNIFICANT CHANGE UP (ref 8–20)
CALCIUM SERPL-MCNC: 8.4 MG/DL — LOW (ref 8.6–10.2)
CHLORIDE SERPL-SCNC: 103 MMOL/L — SIGNIFICANT CHANGE UP (ref 98–107)
CO2 SERPL-SCNC: 21 MMOL/L — LOW (ref 22–29)
COLOR FLD: ABNORMAL
COLOR SPEC: YELLOW — SIGNIFICANT CHANGE UP
CREAT SERPL-MCNC: 1.09 MG/DL — SIGNIFICANT CHANGE UP (ref 0.5–1.3)
DIFF PNL FLD: ABNORMAL
EOSINOPHIL # BLD AUTO: 0 K/UL — SIGNIFICANT CHANGE UP (ref 0–0.5)
EOSINOPHIL NFR BLD AUTO: 0 % — SIGNIFICANT CHANGE UP (ref 0–6)
FLUID INTAKE SUBSTANCE CLASS: SIGNIFICANT CHANGE UP
FLUID SEGMENTED GRANULOCYTES: 7 % — SIGNIFICANT CHANGE UP
GAS PNL BLDA: SIGNIFICANT CHANGE UP
GLUCOSE BLDC GLUCOMTR-MCNC: 83 MG/DL — SIGNIFICANT CHANGE UP (ref 70–99)
GLUCOSE SERPL-MCNC: 54 MG/DL — LOW (ref 70–115)
GLUCOSE UR QL: NEGATIVE MG/DL — SIGNIFICANT CHANGE UP
HCT VFR BLD CALC: 26.6 % — LOW (ref 39–50)
HGB BLD-MCNC: 8.7 G/DL — LOW (ref 13–17)
INR BLD: 1.89 RATIO — HIGH (ref 0.88–1.16)
KETONES UR-MCNC: ABNORMAL
LACTATE BLDV-MCNC: 4.1 MMOL/L — CRITICAL HIGH (ref 0.5–2)
LEUKOCYTE ESTERASE UR-ACNC: ABNORMAL
LYMPHOCYTES # BLD AUTO: 0.1 K/UL — LOW (ref 1–3.3)
LYMPHOCYTES # BLD AUTO: 2.6 % — LOW (ref 13–44)
LYMPHOCYTES # FLD: 11 % — SIGNIFICANT CHANGE UP
MCHC RBC-ENTMCNC: 32.7 GM/DL — SIGNIFICANT CHANGE UP (ref 32–36)
MCHC RBC-ENTMCNC: 33.2 PG — SIGNIFICANT CHANGE UP (ref 27–34)
MCV RBC AUTO: 101.5 FL — HIGH (ref 80–100)
MESOTHL CELL # FLD: 2 % — SIGNIFICANT CHANGE UP
MONOCYTES # BLD AUTO: 0 K/UL — SIGNIFICANT CHANGE UP (ref 0–0.9)
MONOCYTES NFR BLD AUTO: 0 % — LOW (ref 2–14)
MONOS+MACROS # FLD: 80 % — SIGNIFICANT CHANGE UP
NEUTROPHILS # BLD AUTO: 3.68 K/UL — SIGNIFICANT CHANGE UP (ref 1.8–7.4)
NEUTROPHILS NFR BLD AUTO: 84.4 % — HIGH (ref 43–77)
NITRITE UR-MCNC: NEGATIVE — SIGNIFICANT CHANGE UP
PH UR: 5 — SIGNIFICANT CHANGE UP (ref 5–8)
PLATELET # BLD AUTO: 92 K/UL — LOW (ref 150–400)
POTASSIUM SERPL-MCNC: 3.6 MMOL/L — SIGNIFICANT CHANGE UP (ref 3.5–5.3)
POTASSIUM SERPL-SCNC: 3.6 MMOL/L — SIGNIFICANT CHANGE UP (ref 3.5–5.3)
PROT SERPL-MCNC: 5.7 G/DL — LOW (ref 6.6–8.7)
PROT UR-MCNC: 15 MG/DL
PROTHROM AB SERPL-ACNC: 22.2 SEC — HIGH (ref 10–12.9)
RAPID RVP RESULT: SIGNIFICANT CHANGE UP
RBC # BLD: 2.62 M/UL — LOW (ref 4.2–5.8)
RBC # FLD: 16.6 % — HIGH (ref 10.3–14.5)
RCV VOL RI: HIGH /UL (ref 0–5)
SODIUM SERPL-SCNC: 138 MMOL/L — SIGNIFICANT CHANGE UP (ref 135–145)
SP GR SPEC: 1.02 — SIGNIFICANT CHANGE UP (ref 1.01–1.02)
TOTAL NUCLEATED CELL COUNT, BODY FLUID: 274 /UL — HIGH (ref 0–5)
TUBE TYPE: SIGNIFICANT CHANGE UP
UROBILINOGEN FLD QL: 1 MG/DL
WBC # BLD: 3.78 K/UL — LOW (ref 3.8–10.5)
WBC # FLD AUTO: 3.78 K/UL — LOW (ref 3.8–10.5)

## 2019-10-22 PROCEDURE — 71045 X-RAY EXAM CHEST 1 VIEW: CPT | Mod: 26

## 2019-10-22 PROCEDURE — 99291 CRITICAL CARE FIRST HOUR: CPT | Mod: 25

## 2019-10-22 PROCEDURE — 99291 CRITICAL CARE FIRST HOUR: CPT

## 2019-10-22 PROCEDURE — 93010 ELECTROCARDIOGRAM REPORT: CPT

## 2019-10-22 PROCEDURE — 49082 ABD PARACENTESIS: CPT

## 2019-10-22 RX ORDER — SODIUM CHLORIDE 9 MG/ML
1000 INJECTION, SOLUTION INTRAVENOUS
Refills: 0 | Status: DISCONTINUED | OUTPATIENT
Start: 2019-10-22 | End: 2019-10-22

## 2019-10-22 RX ORDER — ALBUMIN HUMAN 25 %
100 VIAL (ML) INTRAVENOUS EVERY 6 HOURS
Refills: 0 | Status: COMPLETED | OUTPATIENT
Start: 2019-10-22 | End: 2019-10-23

## 2019-10-22 RX ORDER — PANTOPRAZOLE SODIUM 20 MG/1
40 TABLET, DELAYED RELEASE ORAL
Refills: 0 | Status: DISCONTINUED | OUTPATIENT
Start: 2019-10-22 | End: 2019-10-24

## 2019-10-22 RX ORDER — LACTULOSE 10 G/15ML
10 SOLUTION ORAL EVERY 8 HOURS
Refills: 0 | Status: DISCONTINUED | OUTPATIENT
Start: 2019-10-22 | End: 2019-10-25

## 2019-10-22 RX ORDER — CHLORHEXIDINE GLUCONATE 213 G/1000ML
1 SOLUTION TOPICAL
Refills: 0 | Status: DISCONTINUED | OUTPATIENT
Start: 2019-10-22 | End: 2019-10-22

## 2019-10-22 RX ORDER — DEXTROSE 50 % IN WATER 50 %
50 SYRINGE (ML) INTRAVENOUS ONCE
Refills: 0 | Status: COMPLETED | OUTPATIENT
Start: 2019-10-22 | End: 2019-10-22

## 2019-10-22 RX ORDER — SODIUM CHLORIDE 9 MG/ML
2400 INJECTION INTRAMUSCULAR; INTRAVENOUS; SUBCUTANEOUS ONCE
Refills: 0 | Status: COMPLETED | OUTPATIENT
Start: 2019-10-22 | End: 2019-10-22

## 2019-10-22 RX ORDER — SODIUM CHLORIDE 9 MG/ML
1000 INJECTION, SOLUTION INTRAVENOUS ONCE
Refills: 0 | Status: COMPLETED | OUTPATIENT
Start: 2019-10-22 | End: 2019-10-22

## 2019-10-22 RX ORDER — VANCOMYCIN HCL 1 G
1000 VIAL (EA) INTRAVENOUS ONCE
Refills: 0 | Status: COMPLETED | OUTPATIENT
Start: 2019-10-22 | End: 2019-10-22

## 2019-10-22 RX ORDER — SODIUM CHLORIDE 9 MG/ML
1000 INJECTION, SOLUTION INTRAVENOUS ONCE
Refills: 0 | Status: COMPLETED | OUTPATIENT
Start: 2019-10-22 | End: 2019-10-23

## 2019-10-22 RX ORDER — ACETAMINOPHEN 500 MG
975 TABLET ORAL ONCE
Refills: 0 | Status: DISCONTINUED | OUTPATIENT
Start: 2019-10-22 | End: 2019-10-22

## 2019-10-22 RX ORDER — SODIUM CHLORIDE 9 MG/ML
1000 INJECTION, SOLUTION INTRAVENOUS
Refills: 0 | Status: DISCONTINUED | OUTPATIENT
Start: 2019-10-22 | End: 2019-10-23

## 2019-10-22 RX ORDER — HEPARIN SODIUM 5000 [USP'U]/ML
7500 INJECTION INTRAVENOUS; SUBCUTANEOUS EVERY 8 HOURS
Refills: 0 | Status: DISCONTINUED | OUTPATIENT
Start: 2019-10-22 | End: 2019-10-24

## 2019-10-22 RX ORDER — PIPERACILLIN AND TAZOBACTAM 4; .5 G/20ML; G/20ML
3.38 INJECTION, POWDER, LYOPHILIZED, FOR SOLUTION INTRAVENOUS ONCE
Refills: 0 | Status: COMPLETED | OUTPATIENT
Start: 2019-10-22 | End: 2019-10-22

## 2019-10-22 RX ADMIN — LACTULOSE 10 GRAM(S): 10 SOLUTION ORAL at 22:39

## 2019-10-22 RX ADMIN — SODIUM CHLORIDE 1000 MILLILITER(S): 9 INJECTION, SOLUTION INTRAVENOUS at 20:09

## 2019-10-22 RX ADMIN — SODIUM CHLORIDE 100 MILLILITER(S): 9 INJECTION, SOLUTION INTRAVENOUS at 21:26

## 2019-10-22 RX ADMIN — HEPARIN SODIUM 7500 UNIT(S): 5000 INJECTION INTRAVENOUS; SUBCUTANEOUS at 22:35

## 2019-10-22 RX ADMIN — SODIUM CHLORIDE 100 MILLILITER(S): 9 INJECTION, SOLUTION INTRAVENOUS at 21:37

## 2019-10-22 RX ADMIN — Medication 1000 MILLIGRAM(S): at 21:16

## 2019-10-22 RX ADMIN — SODIUM CHLORIDE 1600 MILLILITER(S): 9 INJECTION INTRAMUSCULAR; INTRAVENOUS; SUBCUTANEOUS at 17:10

## 2019-10-22 RX ADMIN — SODIUM CHLORIDE 1000 MILLILITER(S): 9 INJECTION, SOLUTION INTRAVENOUS at 22:10

## 2019-10-22 RX ADMIN — Medication 250 MILLIGRAM(S): at 19:56

## 2019-10-22 RX ADMIN — Medication 50 MILLILITER(S): at 18:27

## 2019-10-22 RX ADMIN — Medication 50 MILLILITER(S): at 17:18

## 2019-10-22 RX ADMIN — SODIUM CHLORIDE 1000 MILLILITER(S): 9 INJECTION, SOLUTION INTRAVENOUS at 21:37

## 2019-10-22 RX ADMIN — Medication 50 MILLILITER(S): at 22:41

## 2019-10-22 RX ADMIN — SODIUM CHLORIDE 2400 MILLILITER(S): 9 INJECTION INTRAMUSCULAR; INTRAVENOUS; SUBCUTANEOUS at 19:57

## 2019-10-22 RX ADMIN — PIPERACILLIN AND TAZOBACTAM 200 GRAM(S): 4; .5 INJECTION, POWDER, LYOPHILIZED, FOR SOLUTION INTRAVENOUS at 18:15

## 2019-10-22 RX ADMIN — PIPERACILLIN AND TAZOBACTAM 3.38 GRAM(S): 4; .5 INJECTION, POWDER, LYOPHILIZED, FOR SOLUTION INTRAVENOUS at 19:57

## 2019-10-22 NOTE — ED PROVIDER NOTE - PHYSICAL EXAMINATION
GEN APPEARANCE: WDWN, toxic appearing, lethargic, no AD.   HEAD: Atraumatic, normocephalic   EYES: PERRLa, EOMI, vision grossly intact.   EARS: Gross hearing intact.   NOSE: No nasal discharge, no external evidence of epistaxis.   THROAT: MMM. Oral cavity and pharynx normal. No inflammation, no swelling, no exudate, no oral lesions.  NECK: Supple  CV: RRR, S1S2, no c/r/m/g. No cyanosis or pallor. Extremities warm, well perfused. Cap refill <2 seconds. No bruits.   LUNGS: CTAB. No wheezing. No rales. No rhonchi. No diminished breath sounds.   ABDOMEN: Soft, NTND. No guarding or rebound. No masses.   MSK: Spine appears normal, no spine point tenderness. No CVA ttp. No joint erythema or tenderness. Normal muscular development. Pelvis stable.  EXTREMITIES: No peripheral edema. No obvious joint or bony deformity.  NEURO: Alert, follows commands. Weight bearing normal. Speech normal. Sensation and motor normal x4 extremities.   SKIN: Normal color for race, warm, dry and intact. No evidence of rash.  PSYCH: Normal mood and affect. GEN APPEARANCE: WDWN, toxic appearing, lethargic, no AD.   HEAD: Atraumatic, normocephalic   EYES: PERRLa, EOMI, vision grossly intact.   EARS: Gross hearing intact.   NOSE: No nasal discharge, no external evidence of epistaxis.   NECK: Supple  CV: RRR, S1S2, no c/r/m/g. No cyanosis or pallor. Extremities warm, well perfused. Cap refill <2 seconds. No bruits.   LUNGS: CTAB. No wheezing. No rales. No rhonchi. No diminished breath sounds.   ABDOMEN: abdominal fullness, mild diffuse tenderness    MSK: Spine appears normal, no spine point tenderness. No CVA ttp. No joint erythema or tenderness. Normal muscular development. Pelvis stable.  EXTREMITIES: No peripheral edema. No obvious joint or bony deformity.  NEURO: Alert, follows commands. Weight bearing normal. Speech normal. Sensation and motor normal x4 extremities.   SKIN: Normal color for race, warm, dry and intact. No evidence of rash.  PSYCH: Normal mood and affect.

## 2019-10-22 NOTE — ED PROVIDER NOTE - OBJECTIVE STATEMENT
63M hx of non etoh cirrhosis presents with a cc of fever, worsening abdominal fullness, increased weakness, denies worsening abdominal pain, per family at beside report was recently admitted for increasing abdominal fullness and fluid, also at that time had "hepatic bleed", over last few days father has not been acting like self. Normally pt A&Ox3, pt A&Ox1 during initial assessment. 63M hx of non etoh cirrhosis presents with a cc of fever, worsening abdominal fullness, increased weakness, denies worsening abdominal pain, per family at beside report was recently admitted for increasing abdominal fullness and fluid, also at that time had "hepatic bleed", over last few days father has not been acting like self. Normally pt A&Ox3, pt A&Ox1 during initial assessment.    GI- Carmen  pcp- Nnamdi

## 2019-10-22 NOTE — ED PROVIDER NOTE - CARE PLAN
Principal Discharge DX:	AMS (altered mental status)  Secondary Diagnosis:	Hypoglycemia  Secondary Diagnosis:	Ascites

## 2019-10-22 NOTE — ED PROVIDER NOTE - ATTENDING CONTRIBUTION TO CARE
I, Antonia Garzon, have personally seen and examined this patient. I have fully participated in the care of this patient. I have reviewed all pertinent clinical information, including history, physical exam, plan and the Resident's note and agree except as noted below.     see mdm     Gen: ill appearing, lethargic, AOx1, arouses to minimal physical stimuli   Head: NCAT  HEENT: PERRL, EOMI, oral mucosa moist, pale conjunctiva, neck supple, +jaundice  Lung: CTAB, shallow respirations  CV: rrr, no murmur, Normal perfusion  Abd: soft, distended, no obvious ttp, +fluid wave  MSK: +3 b/l LE pitting edema, no visible deformities  Neuro: withdraws to pain all extremities  Skin: No rash +anasarca   Psych: unable to assess

## 2019-10-22 NOTE — ED PROVIDER NOTE - PROGRESS NOTE DETAILS
repeat FS hypoglycemic and somnolent but still oriented, 2nd amp ordered, will start D10 gtt. tap for SBP cloudy fluidy will send to lab, CXR asnd UA pending, MICU to be consulted. q1 FS -Slowey DO Pt seen and eval by MICU and accepted to s/o Dr. White Pt seen and eval by MICU and accepted to s/o Dr. White.  Pt's fluid status was reassessed and pt was treated and evaluated for sepsis

## 2019-10-22 NOTE — ED ADULT NURSE REASSESSMENT NOTE - NS ED NURSE REASSESS COMMENT FT1
ICU MDs aware of Bp - hypotensive. MDs to come re-eval pt. Pt is resting on cardiac monitor at this time.
ICU PA at bedside now for re-eval.
Pt received awake and alert on cardiac monitor with family at bedside and ICU MD for pt eval/ consult. Pt states no pain at this time but oral temp of 101.3, MD aware. Pt in no distress at this time. Awaiting ICU bed. Pt repositioned for comfort and given water. MDs aware of vitals. Call bell within reach.
Assumed pt care at this time. Pt awake IV fluids infusing with IV abx Zosyn. Pt appears to be tolerating well. Pt with arrives to ED with low BS and fever. Sepsis called. Pt worked up in critical arrives with 20G left AC and 20G right hand. Pt waiting for lab results and disposition family at bedside.

## 2019-10-22 NOTE — H&P ADULT - ASSESSMENT
Persistent hypoglycemia in setting of medication use ( Insulin/ Trajenta) and sepsis  Severe sepsis  Non-alcoholic cirrhosis w/ hepatic encephalopathy and ascites  GIB, sec to GAVE s/p APC  chronic thrombocytopenia  DM    Neuro: Avoid sedation if possible  Cardiovascular: Aim for MAP target 65. Hold off on aldactone/ any diuretics till fluid resuscitated. Trend lactate  Resp/Chest: Maintain SpO2 > 92%  GI/Nutrition: NPO except meds. Continue PPI, lactulose ( 3-5 BMx) and Rifaximin  ID: On empiric Abx Zosyn/ Vanco. f/u Blood cultures, ascitic fluid cultures  Renal: Avoid nephrotoxic agents. Strict I&O  Endocrinology: Hold lantus and Trajenta for now. Maintain Blood sugar < 180. ISS as per protocol  Haem/Oncology:  DVT ppx w/ HSQ    Critical Care time: 35 minutes assessing presenting problems of acute illness that poses high probability of life threatening deterioration or end organ damage/dysfunction.  Medical decision making including Initiating plan of care, reviewing data, reviewing radiology, discussing with multidisciplinary team, non inclusive of procedures, discussing goals of care with patient/family Persistent hypoglycemia in setting of medication use ( Insulin/ Trajenta) and sepsis  Severe sepsis  Non-alcoholic cirrhosis w/ hepatic encephalopathy and ascites  GIB, sec to GAVE s/p APC  chronic thrombocytopenia  DM    Neuro: Avoid sedation if possible  Cardiovascular: Aim for MAP target 65. Hold off on aldactone/ any diuretics till fluid resuscitated. Trend lactate. Recent TTE w/ normal EF.   Resp/Chest: Maintain SpO2 > 92%  GI/Nutrition: NPO except meds. Continue PPI, lactulose ( 3-5 BMx) and Rifaximin  ID: On empiric Abx Zosyn/ Vanco. f/u Blood cultures, ascitic fluid cultures  Renal: Avoid nephrotoxic agents. Strict I&O  Endocrinology: Hold lantus and Trajenta for now. On D5LR infusion for now. Check FS hourly. Maintain Blood sugar < 180  Haem/Oncology:  DVT ppx w/ HSQ    Critical Care time: 35 minutes assessing presenting problems of acute illness that poses high probability of life threatening deterioration or end organ damage/dysfunction.  Medical decision making including Initiating plan of care, reviewing data, reviewing radiology, discussing with multidisciplinary team, non inclusive of procedures, discussing goals of care with patient/family

## 2019-10-22 NOTE — ED PROVIDER NOTE - CLINICAL SUMMARY MEDICAL DECISION MAKING FREE TEXT BOX
patient with extensive PMH- hepatic encephalopthy, nonalcoholic cirrhosis, ascites, recent GI bleed, here with AMS, last night 'not feelin gwell' per family, tonight altered, lethargic, hypoxic and junky on resp exam per EMS. hypoxic to 90s. per family +30+ wt gain. patient lethargic with FS in 50s, given amp and patient awoke AOx3, denies HA/neck pain. mild abd pain/discomfort, +cough. no urinary sx. CODE sepsis called, ideal weight used for fluid bolus. patient with extensive PMH- hepatic encephalopthy, nonalcoholic cirrhosis, ascites, recent GI bleed, here with AMS, last night 'not feelin gwell' per family, tonight altered, lethargic, hypoxic and junky on resp exam per EMS. hypoxic to 90s. per family +30+ wt gain. patient lethargic with FS in 50s, given amp and patient awoke AOx3, denies HA/neck pain. mild abd pain/discomfort, +cough. no urinary sx. CODE sepsis called, ideal weight used for fluid bolus due to morbid obesity and already concern for fluid overload with ascites/anasarca. -Slowey DO

## 2019-10-22 NOTE — H&P ADULT - HISTORY OF PRESENT ILLNESS
63M w/ PMHx DM, HLD, non-alcoholic cirrhosis w/ hepatic encephalopathy (on lactulose), ascites/fluid overload, GIB, 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 Also gives vague h/o abd discomfort and poor appetite. Denies any N/V/D or chest pain. On initial assessment he was found to be hypoglycemic ~29 and had a TMax ~102.6. He was fluid resuscitated, given Abx Zosyn/ Vanco and also one amp D50%, repeat FS ~ 69 for which he received another amp D50. Lactate was 4.1  ICU consulted for fever, persistent hypoglycemia and hypotension.   He also had a diagnostic paracentesis in the ED today ~ 20cc bloody fluid was drained  Recent admission at [facility] earlier this month for fluid overload and weight gain. 63M w/ PMHx DM, HLD, non-alcoholic cirrhosis w/ hepatic encephalopathy (on lactulose), ascites/fluid overload, GIB, 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 Also gives vague h/o abd discomfort and poor appetite. Denies any N/V/D or chest pain. On initial assessment he was found to be hypoglycemic ~29 and had a TMax ~102.6. He was fluid resuscitated, given Abx Zosyn/ Vanco and also one amp D50%, repeat FS ~ 69 for which he received another amp D50. Lactate was 4.1  ICU consulted for fever, persistent hypoglycemia and hypotension. Pt is on Lantus 40U and trajenta 5mg at home which he was compliant with.  He also had a diagnostic paracentesis in the ED today ~ 20cc bloody fluid was drained  Recent admission at [facility] earlier this month for fluid overload and weight gain.

## 2019-10-22 NOTE — H&P ADULT - NSHPREVIEWOFSYSTEMS_GEN_ALL_CORE
CONSTITUTIONAL: No fever, or fatigue. +ve Chills  EYES: No eye pain, visual disturbances, or discharge  ENMT:  No difficulty hearing, tinnitus, vertigo; No sinus or throat pain  NECK: No pain or stiffness  RESPIRATORY: No cough, wheezing, chills or hemoptysis; No shortness of breath  CARDIOVASCULAR: No chest pain, palpitations, dizziness, or leg swelling  GASTROINTESTINAL: No abdominal or epigastric pain. No nausea, vomiting, or hematemesis; No diarrhea or constipation. No melena or hematochezia.  GENITOURINARY: No dysuria, frequency, hematuria, or incontinence  NEUROLOGICAL: No headaches, memory loss, loss of strength, numbness, or tremors  SKIN: No itching, burning, rashes, or lesions   MUSCULOSKELETAL: No joint pain or swelling; No muscle, back, or extremity pain  PSYCHIATRIC: No depression, anxiety, mood swings, or difficulty sleeping

## 2019-10-22 NOTE — H&P ADULT - NSHPPHYSICALEXAM_GEN_ALL_CORE
General: No acute distress.      Neuro: AAO*3, No motor, sensory, or cranial nerve deficit    HEENT: Pupils equal, reactive to light, Moist oral mucosa    PULM: Clear to auscultation bilaterally, no significant adventitious breath sounds     CVS: Regular rhythm and controlled rate, no murmurs, rubs, or gallops    ABD: Soft, distended, nontender, normoactive bowel sounds    EXT: 2+ b/l LE edema, nontender with pedal pulse palpable     SKIN: Warm and well perfused, no acute rashes

## 2019-10-23 LAB
ALBUMIN FLD-MCNC: 0.5 G/DL — SIGNIFICANT CHANGE UP
ALBUMIN SERPL ELPH-MCNC: 2.5 G/DL — LOW (ref 3.3–5.2)
ALP SERPL-CCNC: 95 U/L — SIGNIFICANT CHANGE UP (ref 40–120)
ALT FLD-CCNC: 36 U/L — SIGNIFICANT CHANGE UP
AMMONIA BLD-MCNC: 53 UMOL/L — SIGNIFICANT CHANGE UP (ref 11–55)
ANION GAP SERPL CALC-SCNC: 14 MMOL/L — SIGNIFICANT CHANGE UP (ref 5–17)
ANION GAP SERPL CALC-SCNC: 15 MMOL/L — SIGNIFICANT CHANGE UP (ref 5–17)
AST SERPL-CCNC: 135 U/L — HIGH
BILIRUB SERPL-MCNC: 6.2 MG/DL — HIGH (ref 0.4–2)
BUN SERPL-MCNC: 19 MG/DL — SIGNIFICANT CHANGE UP (ref 8–20)
BUN SERPL-MCNC: 21 MG/DL — HIGH (ref 8–20)
CALCIUM SERPL-MCNC: 8.1 MG/DL — LOW (ref 8.6–10.2)
CALCIUM SERPL-MCNC: 8.4 MG/DL — LOW (ref 8.6–10.2)
CHLORIDE SERPL-SCNC: 101 MMOL/L — SIGNIFICANT CHANGE UP (ref 98–107)
CHLORIDE SERPL-SCNC: 104 MMOL/L — SIGNIFICANT CHANGE UP (ref 98–107)
CO2 SERPL-SCNC: 20 MMOL/L — LOW (ref 22–29)
CO2 SERPL-SCNC: 21 MMOL/L — LOW (ref 22–29)
CREAT SERPL-MCNC: 0.98 MG/DL — SIGNIFICANT CHANGE UP (ref 0.5–1.3)
CREAT SERPL-MCNC: 0.99 MG/DL — SIGNIFICANT CHANGE UP (ref 0.5–1.3)
GLUCOSE BLDC GLUCOMTR-MCNC: 105 MG/DL — HIGH (ref 70–99)
GLUCOSE BLDC GLUCOMTR-MCNC: 105 MG/DL — HIGH (ref 70–99)
GLUCOSE BLDC GLUCOMTR-MCNC: 106 MG/DL — HIGH (ref 70–99)
GLUCOSE BLDC GLUCOMTR-MCNC: 110 MG/DL — HIGH (ref 70–99)
GLUCOSE BLDC GLUCOMTR-MCNC: 113 MG/DL — HIGH (ref 70–99)
GLUCOSE BLDC GLUCOMTR-MCNC: 130 MG/DL — HIGH (ref 70–99)
GLUCOSE BLDC GLUCOMTR-MCNC: 137 MG/DL — HIGH (ref 70–99)
GLUCOSE BLDC GLUCOMTR-MCNC: 89 MG/DL — SIGNIFICANT CHANGE UP (ref 70–99)
GLUCOSE BLDC GLUCOMTR-MCNC: 93 MG/DL — SIGNIFICANT CHANGE UP (ref 70–99)
GLUCOSE BLDC GLUCOMTR-MCNC: 98 MG/DL — SIGNIFICANT CHANGE UP (ref 70–99)
GLUCOSE FLD-MCNC: 93 MG/DL — SIGNIFICANT CHANGE UP
GLUCOSE SERPL-MCNC: 103 MG/DL — SIGNIFICANT CHANGE UP (ref 70–115)
GLUCOSE SERPL-MCNC: 81 MG/DL — SIGNIFICANT CHANGE UP (ref 70–115)
GRAM STN FLD: SIGNIFICANT CHANGE UP
HAV IGM SER-ACNC: SIGNIFICANT CHANGE UP
HBV CORE IGM SER-ACNC: SIGNIFICANT CHANGE UP
HBV SURFACE AG SER-ACNC: SIGNIFICANT CHANGE UP
HCT VFR BLD CALC: 22.4 % — LOW (ref 39–50)
HCT VFR BLD CALC: 22.7 % — LOW (ref 39–50)
HCV AB S/CO SERPL IA: 0.15 S/CO — SIGNIFICANT CHANGE UP (ref 0–0.99)
HCV AB SERPL-IMP: SIGNIFICANT CHANGE UP
HGB BLD-MCNC: 7.3 G/DL — LOW (ref 13–17)
HGB BLD-MCNC: 7.6 G/DL — LOW (ref 13–17)
HIV 1 & 2 AB SERPL IA.RAPID: SIGNIFICANT CHANGE UP
LACTATE BLDV-MCNC: 4.3 MMOL/L — CRITICAL HIGH (ref 0.5–2)
LACTATE SERPL-SCNC: 4.7 MMOL/L — CRITICAL HIGH (ref 0.5–2)
LACTATE SERPL-SCNC: 4.8 MMOL/L — CRITICAL HIGH (ref 0.5–2)
LDH SERPL L TO P-CCNC: 79 U/L — SIGNIFICANT CHANGE UP
MCHC RBC-ENTMCNC: 32.6 GM/DL — SIGNIFICANT CHANGE UP (ref 32–36)
MCHC RBC-ENTMCNC: 33.2 PG — SIGNIFICANT CHANGE UP (ref 27–34)
MCHC RBC-ENTMCNC: 33.5 GM/DL — SIGNIFICANT CHANGE UP (ref 32–36)
MCHC RBC-ENTMCNC: 33.6 PG — SIGNIFICANT CHANGE UP (ref 27–34)
MCV RBC AUTO: 100.4 FL — HIGH (ref 80–100)
MCV RBC AUTO: 101.8 FL — HIGH (ref 80–100)
PLATELET # BLD AUTO: 84 K/UL — LOW (ref 150–400)
PLATELET # BLD AUTO: 87 K/UL — LOW (ref 150–400)
POTASSIUM SERPL-MCNC: 3.9 MMOL/L — SIGNIFICANT CHANGE UP (ref 3.5–5.3)
POTASSIUM SERPL-MCNC: 4.5 MMOL/L — SIGNIFICANT CHANGE UP (ref 3.5–5.3)
POTASSIUM SERPL-SCNC: 3.9 MMOL/L — SIGNIFICANT CHANGE UP (ref 3.5–5.3)
POTASSIUM SERPL-SCNC: 4.5 MMOL/L — SIGNIFICANT CHANGE UP (ref 3.5–5.3)
PROT SERPL-MCNC: 5.1 G/DL — LOW (ref 6.6–8.7)
RBC # BLD: 2.2 M/UL — LOW (ref 4.2–5.8)
RBC # BLD: 2.26 M/UL — LOW (ref 4.2–5.8)
RBC # FLD: 17.2 % — HIGH (ref 10.3–14.5)
RBC # FLD: 17.2 % — HIGH (ref 10.3–14.5)
SODIUM SERPL-SCNC: 136 MMOL/L — SIGNIFICANT CHANGE UP (ref 135–145)
SODIUM SERPL-SCNC: 139 MMOL/L — SIGNIFICANT CHANGE UP (ref 135–145)
SPECIMEN SOURCE: SIGNIFICANT CHANGE UP
VANCOMYCIN TROUGH SERPL-MCNC: <4 UG/ML — LOW (ref 10–20)
WBC # BLD: 14.19 K/UL — HIGH (ref 3.8–10.5)
WBC # BLD: 16.8 K/UL — HIGH (ref 3.8–10.5)
WBC # FLD AUTO: 14.19 K/UL — HIGH (ref 3.8–10.5)
WBC # FLD AUTO: 16.8 K/UL — HIGH (ref 3.8–10.5)

## 2019-10-23 PROCEDURE — 99222 1ST HOSP IP/OBS MODERATE 55: CPT

## 2019-10-23 PROCEDURE — 74176 CT ABD & PELVIS W/O CONTRAST: CPT | Mod: 26

## 2019-10-23 PROCEDURE — 99233 SBSQ HOSP IP/OBS HIGH 50: CPT

## 2019-10-23 RX ORDER — CHOLECALCIFEROL (VITAMIN D3) 125 MCG
1000 CAPSULE ORAL DAILY
Refills: 0 | Status: DISCONTINUED | OUTPATIENT
Start: 2019-10-23 | End: 2019-11-08

## 2019-10-23 RX ORDER — INFLUENZA VIRUS VACCINE 15; 15; 15; 15 UG/.5ML; UG/.5ML; UG/.5ML; UG/.5ML
0.5 SUSPENSION INTRAMUSCULAR ONCE
Refills: 0 | Status: DISCONTINUED | OUTPATIENT
Start: 2019-10-23 | End: 2019-11-08

## 2019-10-23 RX ORDER — CEFTRIAXONE 500 MG/1
2000 INJECTION, POWDER, FOR SOLUTION INTRAMUSCULAR; INTRAVENOUS
Refills: 0 | Status: DISCONTINUED | OUTPATIENT
Start: 2019-10-23 | End: 2019-11-07

## 2019-10-23 RX ORDER — VANCOMYCIN HCL 1 G
1500 VIAL (EA) INTRAVENOUS ONCE
Refills: 0 | Status: COMPLETED | OUTPATIENT
Start: 2019-10-23 | End: 2019-10-23

## 2019-10-23 RX ORDER — URSODIOL 250 MG/1
300 TABLET, FILM COATED ORAL THREE TIMES A DAY
Refills: 0 | Status: DISCONTINUED | OUTPATIENT
Start: 2019-10-23 | End: 2019-11-08

## 2019-10-23 RX ORDER — SUCRALFATE 1 G
1 TABLET ORAL EVERY 6 HOURS
Refills: 0 | Status: DISCONTINUED | OUTPATIENT
Start: 2019-10-23 | End: 2019-11-08

## 2019-10-23 RX ORDER — ATORVASTATIN CALCIUM 80 MG/1
20 TABLET, FILM COATED ORAL AT BEDTIME
Refills: 0 | Status: DISCONTINUED | OUTPATIENT
Start: 2019-10-23 | End: 2019-11-08

## 2019-10-23 RX ORDER — SPIRONOLACTONE 25 MG/1
25 TABLET, FILM COATED ORAL DAILY
Refills: 0 | Status: DISCONTINUED | OUTPATIENT
Start: 2019-10-23 | End: 2019-10-24

## 2019-10-23 RX ORDER — FUROSEMIDE 40 MG
40 TABLET ORAL DAILY
Refills: 0 | Status: DISCONTINUED | OUTPATIENT
Start: 2019-10-23 | End: 2019-10-24

## 2019-10-23 RX ADMIN — CEFTRIAXONE 100 MILLIGRAM(S): 500 INJECTION, POWDER, FOR SOLUTION INTRAMUSCULAR; INTRAVENOUS at 10:42

## 2019-10-23 RX ADMIN — URSODIOL 300 MILLIGRAM(S): 250 TABLET, FILM COATED ORAL at 22:08

## 2019-10-23 RX ADMIN — Medication 300 MILLIGRAM(S): at 13:17

## 2019-10-23 RX ADMIN — Medication 50 MILLILITER(S): at 05:52

## 2019-10-23 RX ADMIN — SODIUM CHLORIDE 1000 MILLILITER(S): 9 INJECTION, SOLUTION INTRAVENOUS at 00:33

## 2019-10-23 RX ADMIN — Medication 1 GRAM(S): at 17:29

## 2019-10-23 RX ADMIN — LACTULOSE 10 GRAM(S): 10 SOLUTION ORAL at 13:18

## 2019-10-23 RX ADMIN — PANTOPRAZOLE SODIUM 40 MILLIGRAM(S): 20 TABLET, DELAYED RELEASE ORAL at 06:40

## 2019-10-23 RX ADMIN — Medication 1000 UNIT(S): at 22:08

## 2019-10-23 RX ADMIN — Medication 40 MILLIGRAM(S): at 17:30

## 2019-10-23 RX ADMIN — LACTULOSE 10 GRAM(S): 10 SOLUTION ORAL at 22:08

## 2019-10-23 RX ADMIN — HEPARIN SODIUM 7500 UNIT(S): 5000 INJECTION INTRAVENOUS; SUBCUTANEOUS at 05:12

## 2019-10-23 RX ADMIN — Medication 50 MILLILITER(S): at 11:29

## 2019-10-23 RX ADMIN — LACTULOSE 10 GRAM(S): 10 SOLUTION ORAL at 05:12

## 2019-10-23 RX ADMIN — ATORVASTATIN CALCIUM 20 MILLIGRAM(S): 80 TABLET, FILM COATED ORAL at 22:08

## 2019-10-23 RX ADMIN — SODIUM CHLORIDE 100 MILLILITER(S): 9 INJECTION, SOLUTION INTRAVENOUS at 07:49

## 2019-10-23 RX ADMIN — HEPARIN SODIUM 7500 UNIT(S): 5000 INJECTION INTRAVENOUS; SUBCUTANEOUS at 13:18

## 2019-10-23 RX ADMIN — SPIRONOLACTONE 25 MILLIGRAM(S): 25 TABLET, FILM COATED ORAL at 17:29

## 2019-10-23 RX ADMIN — HEPARIN SODIUM 7500 UNIT(S): 5000 INJECTION INTRAVENOUS; SUBCUTANEOUS at 22:08

## 2019-10-23 NOTE — PROGRESS NOTE ADULT - ASSESSMENT
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 Also gives vague h/o abd discomfort and poor appetite. Denies any N/V/D or chest pain. He was found to be hypoglycemic ~29 and had a TMax ~102.6. He was fluid resuscitated, given Abx Zosyn/ Vanco and also one amp D50%, repeat FS ~ 69 for which he received another amp D50. Lactate was 4.1 ICU consulted for fever, persistent hypoglycemia and hypotension. Patient takes Lantus 40U and Tradjenta 5mg at home which he was compliant with; but he has not been eating well. In the ED, he had a diagnostic paracentesis ~ 20cc bloody fluid was drained. Found to have gram positive bacteremia and sepsis.        Sepsis due to Gram positive cocci   Source unclear  Continue vancomycin and ceftriaxone pending final cultures  Repeat blood cultures in AM, if not clearing then may need DUSTIN    Cirrhosis 2/2 to JEAN   On Lasix and aldactone    Hepatic encephalopathy   Continue Rifaximin and lactulose     Hypoglycemia with the underlying DM-2 with long time insulin use   Hold Lantus 40mg due to hypoglycemia  Restart as needed     HLD  Continue Atrovastatin 20mg     Supportive   DVT prophylaxis: heparin sc   GI prophylaxis: PPI 40mg   Diet: carbohydrate consistent

## 2019-10-23 NOTE — PROGRESS NOTE ADULT - SUBJECTIVE AND OBJECTIVE BOX
INTERVAL HPI/OVERNIGHT EVENTS: Feels better today, not on pressors.    PHYSICAL EXAM:  GENERAL: NAD   HEAD:  Atraumatic, normocephalic  EYES: EOMI, PERRL  ENMT:  MMM, No oropharyngeal erythema or exudates  NECK:  Supple, normal appearance, trachea midline  NERVOUS SYSTEM:  Alert & Oriented X3, Symmetric strength in all extremities    CHEST/LUNG: Bilateral air entry, no chest deformity, no crackles or wheeze  HEART: Regular rate, regular rhythm; No murmurs, rubs, or gallops  ABDOMEN: distended, nontender  EXTREMITIES:  Bilat edema, no clubbing, no cyanosis.  LYMPH:  No lymphadenopathy noted  SKIN:  good capillary refill  PSYCH: appropriate affect and behavior    RADIOLOGY personally reviewed: cxr- no clear infiltrates, CT abd/pelvis- cirrhosis  GOALS OF CARE DISCUSSION WITH PATIENT/FAMILY/PROXY: patient and family updated on diagnosis and plan of care at the bedside  --------------------------------------------------------------------------------------------------------------------------------------------------------------  On Admission  10-22-19 (1d)  HPI:  63M w/ PMHx DM, HLD, non-alcoholic cirrhosis w/ hepatic encephalopathy (on lactulose), ascites/fluid overload, GIB, 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 Also gives vague h/o abd discomfort and poor appetite. Denies any N/V/D or chest pain. On initial assessment he was found to be hypoglycemic ~29 and had a TMax ~102.6. He was fluid resuscitated, given Abx Zosyn/ Vanco and also one amp D50%, repeat FS ~ 69 for which he received another amp D50. Lactate was 4.1  ICU consulted for fever, persistent hypoglycemia and hypotension. Pt is on Lantus 40U and trajenta 5mg at home which he was compliant with.  He also had a diagnostic paracentesis in the ED today ~ 20cc bloody fluid was drained  Recent admission at [place] earlier this month for fluid overload and weight gain. (22 Oct 2019 20:44)    PAST MEDICAL & SURGICAL HISTORY:  GIB (gastrointestinal bleeding)  GERD with esophagitis: Gastritis &amp; Non Bleeding Ulcers  Hepatic encephalopathy  Obesity  Fatty liver disease, nonalcoholic  Renal stones: 25 years ago  Hypertension  Neuropathy  Hypercholesteremia  Diabetes  S/P cholecystectomy      Antimicrobial:  cefTRIAXone   IVPB 2000 milliGRAM(s) IV Intermittent <User Schedule>  rifAXIMin 550 milliGRAM(s) Oral two times a day    Cardiovascular:    Pulmonary:    Hematalogic:  heparin  Injectable 7500 Unit(s) SubCutaneous every 8 hours    Other:  influenza   Vaccine 0.5 milliLiter(s) IntraMuscular once  lactulose Syrup 10 Gram(s) Oral every 8 hours  pantoprazole    Tablet 40 milliGRAM(s) Oral before breakfast      Drug Dosing Weight  Height (cm): 182.9 (23 Oct 2019 00:00)  Weight (kg): 164.2 (23 Oct 2019 00:00)  BMI (kg/m2): 49.1 (23 Oct 2019 00:00)  BSA (m2): 2.74 (23 Oct 2019 00:00)    T(C): 36.8 (10-23-19 @ 11:59), Max: 39.2 (10-22-19 @ 17:06)  HR: 93 (10-23-19 @ 13:00)  BP: 99/55 (10-23-19 @ 13:00)  BP(mean): 73 (10-23-19 @ 13:00)  ABP: --  ABP(mean): --  RR: 20 (10-23-19 @ 13:00)  SpO2: 95% (10-23-19 @ 13:00)    ABG - ( 22 Oct 2019 17:48 )  pH, Arterial: 7.47  pH, Blood: x     /  pCO2: 33    /  pO2: 72    / HCO3: 25    / Base Excess: 0.2   /  SaO2: 96                    10-22 @ 07:01  -  10-23 @ 07:00  --------------------------------------------------------  IN: 1900 mL / OUT: 140 mL / NET: 1760 mL              LABS:  CBC Full  -  ( 23 Oct 2019 05:08 )  WBC Count : 16.80 K/uL  RBC Count : 2.20 M/uL  Hemoglobin : 7.3 g/dL  Hematocrit : 22.4 %  Platelet Count - Automated : 84 K/uL  Mean Cell Volume : 101.8 fl  Mean Cell Hemoglobin : 33.2 pg  Mean Cell Hemoglobin Concentration : 32.6 gm/dL  Auto Neutrophil # : x  Auto Lymphocyte # : x  Auto Monocyte # : x  Auto Eosinophil # : x  Auto Basophil # : x  Auto Neutrophil % : x  Auto Lymphocyte % : x  Auto Monocyte % : x  Auto Eosinophil % : x  Auto Basophil % : x    10-    139  |  104  |  21.0<H>  ----------------------------<  103  4.5   |  20.0<L>  |  0.99    Ca    8.4<L>      23 Oct 2019 05:08    TPro  5.1<L>  /  Alb  2.5<L>  /  TBili  6.2<H>  /  DBili  x   /  AST  135<H>  /  ALT  36  /  AlkPhos  95  10-23    PT/INR - ( 22 Oct 2019 17:37 )   PT: 22.2 sec;   INR: 1.89 ratio         PTT - ( 22 Oct 2019 17:37 )  PTT:25.0 sec  Urinalysis Basic - ( 22 Oct 2019 21:40 )    Color: Yellow / Appearance: Clear / S.020 / pH: x  Gluc: x / Ketone: Trace  / Bili: Small / Urobili: 1 mg/dL   Blood: x / Protein: 15 mg/dL / Nitrite: Negative   Leuk Esterase: Trace / RBC: 6-10 /HPF / WBC 0-2   Sq Epi: x / Non Sq Epi: Occasional / Bacteria: x      Culture Results:   Growth in aerobic and anaerobic bottles: Gram Positive Cocci in Pairs and  Chains  Aerobic Bottle: 09:17 Hours to positivity  Anaerobic Bottle: 09:27 Hours to positivity  .  TYPE: (C=Critical, N=Notification, A=Abnormal) c  TESTS:  _ gs  DATE/TIME CALLED: _ 10/23/2019 09:20:36  CALLED TO: _ swetha devi rn  READ BACK (2 Patient Identifiers)(Y/N): _ y  READ BACK VALUES (Y/N): _ y  CALLED BY: Tk corona (10-22 @ 17:55)  Culture Results:   Growth in aerobic and anaerobic bottles: Gram Positive Cocci in Pairs and  Chains  Aerobic Bottle: 08:57 Hours to positivity  Anaerobic Bottle: 08:56 Hours to positivity  ***Blood Panel PCR results on this specimen are available  approximately 3 hours after the Gram stain result.***  Gram stain, PCR, and/or culture results may not always  correspond due to difference in methodologies.  ************************************************************  This PCR assay was performed using [employer].  The following targets are tested for: Enterococcus,  vancomycin resistant enterococci, Listeria monocytogenes,  coagulase negative staphylococci, S. aureus,  methicillin resistant S. aureus, Streptococcus agalactiae  (Group B), S. pneumoniae, S. pyogenes (Group A),  Acinetobacter baumannii, Enterobacter cloacae, E. coli,  Klebsiella oxytoca, K. pneumoniae, Proteus sp.,  Serratia marcescens, Haemophilus influenzae,  Neisseria meningitidis, Pseudomonas aeruginosa, Candida  albicans, C. glabrata, C krusei, C parapsilosis,  C. tropicalis and the KPC resistance gene.  "Due to technical problems, Proteus sp. will Not be reported as part of  the BCID panel until further notice"  .  TYPE: (C=Critical, N=Notification, A=Abnormal) c  TESTS:  _ gs  DATE/TIME CALLED: _ 10/23/2019 09:18:36  CALLED TO: Tk devi rn  READ BACK (2 Patient Identifiers)(Y/N): _ y  READ BACK VALUES (Y/N): _ y  CALLED BY: Tk corona  TYPE: (C=Critical, N=Notification, A=Abnormal) c  TESTS:  _ BSB  DATE/TIME CALLED: _10/ 11:06:21  CALLED TO: Tk devi rn  READ BACK (2 Patient Identifiers)(Y/N): _ y  READ BACK VALUES (Y/N): _ y  CALLED BY: Tk corona (10-22 @ 17:55)

## 2019-10-23 NOTE — PROGRESS NOTE ADULT - SUBJECTIVE AND OBJECTIVE BOX
Brief Hospital course:   62y/o male w PMH of DM, HLD, non-alcoholic cirrhosis w/ hepatic encephalopathy (on lactulose), ascites/fluid overload, GIB, chronic thrombocytopenia presented to the ED complaining of chills. Also gives vague h/o abd discomfort and poor appetite. Denies any N/V/D or chest pain. He was found to be hypoglycemic ~29 and had a TMax ~102.6. He was fluid resuscitated, given Abx Zosyn/ Vanco and also one amp D50%, repeat FS ~ 69 for which he received another amp D50. Lactate was 4.1 ICU consulted for fever, persistent hypoglycemia and hypotension. Patient takes Lantus 40U and Tradjenta 5mg at home which he was compliant with; but he has not been eating well. In the ED, he had a diagnostic paracentesis ~ 20cc bloody fluid was drained. Found to have gram positive bacteremia and sepsis.        PAST MEDICAL & SURGICAL HISTORY:  GIB (gastrointestinal bleeding)  GERD with esophagitis: Gastritis &amp; Non Bleeding Ulcers  Hepatic encephalopathy  Obesity  Fatty liver disease, nonalcoholic  Renal stones: 25 years ago  Hypertension  Neuropathy  Hypercholesteremia  Diabetes  S/P cholecystectomy    FH: heart dx in father   Social Hx: no cigarette, alcohol or illicit drug use. Lives with family     Medication:   Ceftriaxone   IVPB 2000 milliGRAM(s) IV Intermittent <User Schedule>  Rifaximin 550 milliGRAM(s) Oral two times a day  Heparin  Injectable 7500 Unit(s) SubCutaneous every 8 hours  Influenza   Vaccine 0.5 milliLiter(s) IntraMuscular once  Lactulose Syrup 10 Gram(s) Oral every 8 hours  Pantoprazole    Tablet 40 milliGRAM(s) Oral before breakfast    PHYSICAL EXAM:  VS: Temp: HR: 91; BP: 93/51; RR: 20, sO2: 94 on 4L NC   GENERAL: well groomed, well nourished not in acute distress   HEAD:  Atraumatic, normocephalic  EYES: EOMI, PERRLA,   ENMT:  MMM, No oropharyngeal erythema or exudates  NECK:  Supple, normal appearance, trachea midline  LUNG: Bilateral air entry, no chest deformity, no crackles or wheeze  HEART: Regular rate, regular rhythm; No murmurs, rubs, or gallops  ABDOMEN: distended, soft, nontender, BS+   EXTREMITIES: B/l edema 2+, no clubbing, no cyanosis.  SKIN:  Jaundiced; no rash, good capillary refill  Neuro:  Alert & Oriented X3, Symmetric strength in all extremities    PSYCH: normal mood and affect       LABS:  CBC Full  -  ( 23 Oct 2019 05:08 )  WBC Count : 16.80 K/uL  RBC Count : 2.20 M/uL  Hemoglobin : 7.3 g/dL  Hematocrit : 22.4 %  Platelet Count - Automated : 84 K/uL  Mean Cell Volume : 101.8 fl  Mean Cell Hemoglobin : 33.2 pg  Mean Cell Hemoglobin Concentration : 32.6 gm/dL  Auto Neutrophil # : x  Auto Lymphocyte # : x  Auto Monocyte # : x  Auto Eosinophil # : x  Auto Basophil # : x  Auto Neutrophil % : x  Auto Lymphocyte % : x  Auto Monocyte % : x  Auto Eosinophil % : x  Auto Basophil % : x    10-    139  |  104  |  21.0<H>  ----------------------------<  103  4.5   |  20.0<L>  |  0.99    Ca    8.4<L>      23 Oct 2019 05:08    TPro  5.1<L>  /  Alb  2.5<L>  /  TBili  6.2<H>  /  DBili  x   /  AST  135<H>  /  ALT  36  /  AlkPhos  95  10-23    PT/INR - ( 22 Oct 2019 17:37 )   PT: 22.2 sec;   INR: 1.89 ratio         PTT - ( 22 Oct 2019 17:37 )  PTT:25.0 sec  Urinalysis Basic - ( 22 Oct 2019 21:40 )    Color: Yellow / Appearance: Clear / S.020 / pH: x  Gluc: x / Ketone: Trace  / Bili: Small / Urobili: 1 mg/dL   Blood: x / Protein: 15 mg/dL / Nitrite: Negative   Leuk Esterase: Trace / RBC: 6-10 /HPF / WBC 0-2   Sq Epi: x / Non Sq Epi: Occasional / Bacteria: x      Culture Results:   Growth in aerobic and anaerobic bottles: Gram Positive Cocci in Pairs and  Chains  Aerobic Bottle: 09:17 Hours to positivity  Anaerobic Bottle: 09:27 Hours to positivity  .  TYPE: (C=Critical, N=Notification, A=Abnormal) c  TESTS:  _   DATE/TIME CALLED: _ 10/23/2019 09:20:36  CALLED TO: Tk devi rn  READ BACK (2 Patient Identifiers)(Y/N): _ y  READ BACK VALUES (Y/N): _ y  CALLED BY: Tk corona (10-22 @ 17:55)  Culture Results:   Growth in aerobic and anaerobic bottles: Gram Positive Cocci in Pairs and  Chains  Aerobic Bottle: 08:57 Hours to positivity  Anaerobic Bottle: 08:56 Hours to positivity  ***Blood Panel PCR results on this specimen are available  approximately 3 hours after the Gram stain result.***  Gram stain, PCR, and/or culture results may not always  correspond due to difference in methodologies.  ************************************************************  This PCR assay was performed using [employer].  The following targets are tested for: Enterococcus,  vancomycin resistant enterococci, Listeria monocytogenes,  coagulase negative staphylococci, S. aureus,  methicillin resistant S. aureus, Streptococcus agalactiae  (Group B), S. pneumoniae, S. pyogenes (Group A),  Acinetobacter baumannii, Enterobacter cloacae, E. coli,  Klebsiella oxytoca, K. pneumoniae, Proteus sp.,  Serratia marcescens, Haemophilus influenzae,  Neisseria meningitidis, Pseudomonas aeruginosa, Candida  albicans, C. glabrata, C krusei, C parapsilosis,  C. tropicalis and the KPC resistance gene.  "Due to technical problems, Proteus sp. will Not be reported as part of  the BCID panel until further notice"  .  TYPE: (C=Critical, N=Notification, A=Abnormal) c  TESTS:  _ gs  DATE/TIME CALLED: _ 10/23/2019 09:18:36  CALLED TO: Tk devi rn  READ BACK (2 Patient Identifiers)(Y/N): _ y  READ BACK VALUES (Y/N): _ y  CALLED BY: Tk corona  TYPE: (C=Critical, N=Notification, A=Abnormal) c  TESTS:  _ BSB  DATE/TIME CALLED: _10/ 11:06:21  CALLED TO: Tk devi rn  READ BACK (2 Patient Identifiers)(Y/N): _ y  READ BACK VALUES (Y/N): _ y  CALLED BY: Tk corona (10-22 @ 17:55)

## 2019-10-23 NOTE — PROGRESS NOTE ADULT - ASSESSMENT
64 yo male with hx of nonalcoholic cirrhosis decompensated by hepatic encephalopathy and ascities, prior GI bleed, presented to the hospital with fever, chills, and hypoglycemia, found to have gram positive bacteremia and sepsis.      Plan:  1) Gram pos bacteremia  Suspect strep given gram stain, Source unclear, echo unrevealing  - continue vancomycin and ceftriaxone pending final cultures  - repeat blood cultures in AM, if not clearing then may need DUSTIN    2) Cirrhosis  - restart lasix and aldactone  - rifaximin and lactulose for hepatic encephalopathy     3) DM  - holding lantus due to hypoglycemia, will need to restart once sepsis resolves

## 2019-10-23 NOTE — CHART NOTE - NSCHARTNOTEFT_GEN_A_CORE
Called by MICU PA for potential downgrade. Labs reviewed; patient noted to have lactic acidosis; 4.8 this morning but not repeated. Vitals reviewed; hypotensive 98/53. Requested a repeat lactate to be checked prior to patient being downgraded to the hospitalist service. Blood cultures to also be repeated.    Discussed with MICU PA Roberto Carlos

## 2019-10-24 DIAGNOSIS — K74.60 UNSPECIFIED CIRRHOSIS OF LIVER: ICD-10-CM

## 2019-10-24 LAB
ALBUMIN SERPL ELPH-MCNC: 2.4 G/DL — LOW (ref 3.3–5.2)
ALP SERPL-CCNC: 61 U/L — SIGNIFICANT CHANGE UP (ref 40–120)
ALT FLD-CCNC: 51 U/L — HIGH
ANION GAP SERPL CALC-SCNC: 13 MMOL/L — SIGNIFICANT CHANGE UP (ref 5–17)
ANISOCYTOSIS BLD QL: SLIGHT — SIGNIFICANT CHANGE UP
APPEARANCE UR: CLEAR — SIGNIFICANT CHANGE UP
AST SERPL-CCNC: 194 U/L — HIGH
BACTERIA # UR AUTO: ABNORMAL
BASO STIPL BLD QL SMEAR: PRESENT — SIGNIFICANT CHANGE UP
BASOPHILS # BLD AUTO: 0 K/UL — SIGNIFICANT CHANGE UP (ref 0–0.2)
BASOPHILS NFR BLD AUTO: 0 % — SIGNIFICANT CHANGE UP (ref 0–2)
BILIRUB SERPL-MCNC: 6.1 MG/DL — HIGH (ref 0.4–2)
BILIRUB UR-MCNC: NEGATIVE — SIGNIFICANT CHANGE UP
BLD GP AB SCN SERPL QL: SIGNIFICANT CHANGE UP
BUN SERPL-MCNC: 36 MG/DL — HIGH (ref 8–20)
CALCIUM SERPL-MCNC: 8.6 MG/DL — SIGNIFICANT CHANGE UP (ref 8.6–10.2)
CHLORIDE SERPL-SCNC: 99 MMOL/L — SIGNIFICANT CHANGE UP (ref 98–107)
CO2 SERPL-SCNC: 20 MMOL/L — LOW (ref 22–29)
COLOR SPEC: YELLOW — SIGNIFICANT CHANGE UP
CREAT ?TM UR-MCNC: 116 MG/DL — SIGNIFICANT CHANGE UP
CREAT SERPL-MCNC: 1.69 MG/DL — HIGH (ref 0.5–1.3)
CULTURE RESULTS: NO GROWTH — SIGNIFICANT CHANGE UP
DIFF PNL FLD: ABNORMAL
EOSINOPHIL # BLD AUTO: 0 K/UL — SIGNIFICANT CHANGE UP (ref 0–0.5)
EOSINOPHIL NFR BLD AUTO: 0 % — SIGNIFICANT CHANGE UP (ref 0–6)
EPI CELLS # UR: SIGNIFICANT CHANGE UP
FERRITIN SERPL-MCNC: 128 NG/ML — SIGNIFICANT CHANGE UP (ref 30–400)
GLUCOSE BLDC GLUCOMTR-MCNC: 116 MG/DL — HIGH (ref 70–99)
GLUCOSE BLDC GLUCOMTR-MCNC: 126 MG/DL — HIGH (ref 70–99)
GLUCOSE BLDC GLUCOMTR-MCNC: 131 MG/DL — HIGH (ref 70–99)
GLUCOSE SERPL-MCNC: 116 MG/DL — HIGH (ref 70–115)
GLUCOSE UR QL: NEGATIVE MG/DL — SIGNIFICANT CHANGE UP
GP B STREP DNA BLD POS QL NAA+NON-PROBE: SIGNIFICANT CHANGE UP
HCT VFR BLD CALC: 20.2 % — CRITICAL LOW (ref 39–50)
HGB BLD-MCNC: 6.6 G/DL — CRITICAL LOW (ref 13–17)
HYPOCHROMIA BLD QL: SIGNIFICANT CHANGE UP
IRON SATN MFR SERPL: 14 % — LOW (ref 16–55)
IRON SATN MFR SERPL: 31 UG/DL — LOW (ref 59–158)
KETONES UR-MCNC: NEGATIVE — SIGNIFICANT CHANGE UP
LACTATE SERPL-SCNC: 3.1 MMOL/L — HIGH (ref 0.5–2)
LEUKOCYTE ESTERASE UR-ACNC: NEGATIVE — SIGNIFICANT CHANGE UP
LYMPHOCYTES # BLD AUTO: 0.66 K/UL — LOW (ref 1–3.3)
LYMPHOCYTES # BLD AUTO: 3.5 % — LOW (ref 13–44)
MACROCYTES BLD QL: SLIGHT — SIGNIFICANT CHANGE UP
MAGNESIUM SERPL-MCNC: 1.6 MG/DL — SIGNIFICANT CHANGE UP (ref 1.6–2.6)
MANUAL SMEAR VERIFICATION: SIGNIFICANT CHANGE UP
MCHC RBC-ENTMCNC: 32.7 GM/DL — SIGNIFICANT CHANGE UP (ref 32–36)
MCHC RBC-ENTMCNC: 33.3 PG — SIGNIFICANT CHANGE UP (ref 27–34)
MCV RBC AUTO: 102 FL — HIGH (ref 80–100)
METHOD TYPE: SIGNIFICANT CHANGE UP
MONOCYTES # BLD AUTO: 0.34 K/UL — SIGNIFICANT CHANGE UP (ref 0–0.9)
MONOCYTES NFR BLD AUTO: 1.8 % — LOW (ref 2–14)
NEUTROPHILS # BLD AUTO: 17.98 K/UL — HIGH (ref 1.8–7.4)
NEUTROPHILS NFR BLD AUTO: 87.7 % — HIGH (ref 43–77)
NEUTS BAND # BLD: 7 % — SIGNIFICANT CHANGE UP (ref 0–8)
NITRITE UR-MCNC: NEGATIVE — SIGNIFICANT CHANGE UP
NRBC # BLD: 1 /100 — HIGH (ref 0–0)
OSMOLALITY UR: 372 MOSM/KG — SIGNIFICANT CHANGE UP (ref 300–1000)
OVALOCYTES BLD QL SMEAR: SLIGHT — SIGNIFICANT CHANGE UP
PH UR: 6 — SIGNIFICANT CHANGE UP (ref 5–8)
PHOSPHATE SERPL-MCNC: 4.8 MG/DL — HIGH (ref 2.4–4.7)
PLAT MORPH BLD: ABNORMAL
PLATELET # BLD AUTO: 84 K/UL — LOW (ref 150–400)
POIKILOCYTOSIS BLD QL AUTO: SLIGHT — SIGNIFICANT CHANGE UP
POTASSIUM SERPL-MCNC: 4.3 MMOL/L — SIGNIFICANT CHANGE UP (ref 3.5–5.3)
POTASSIUM SERPL-SCNC: 4.3 MMOL/L — SIGNIFICANT CHANGE UP (ref 3.5–5.3)
PROCALCITONIN SERPL-MCNC: 11.56 NG/ML — HIGH (ref 0.02–0.1)
PROT SERPL-MCNC: 5 G/DL — LOW (ref 6.6–8.7)
PROT UR-MCNC: 30 MG/DL
RBC # BLD: 1.98 M/UL — LOW (ref 4.2–5.8)
RBC # FLD: 17.8 % — HIGH (ref 10.3–14.5)
RBC BLD AUTO: ABNORMAL
RBC CASTS # UR COMP ASSIST: ABNORMAL /HPF (ref 0–4)
SODIUM SERPL-SCNC: 132 MMOL/L — LOW (ref 135–145)
SODIUM UR-SCNC: <30 MMOL/L — SIGNIFICANT CHANGE UP
SP GR SPEC: 1.02 — SIGNIFICANT CHANGE UP (ref 1.01–1.02)
SPECIMEN SOURCE: SIGNIFICANT CHANGE UP
TARGETS BLD QL SMEAR: SLIGHT — SIGNIFICANT CHANGE UP
TIBC SERPL-MCNC: 215 UG/DL — LOW (ref 220–430)
TRANSFERRIN SERPL-MCNC: 150 MG/DL — LOW (ref 180–329)
UROBILINOGEN FLD QL: NEGATIVE MG/DL — SIGNIFICANT CHANGE UP
WBC # BLD: 18.99 K/UL — HIGH (ref 3.8–10.5)
WBC # FLD AUTO: 18.99 K/UL — HIGH (ref 3.8–10.5)
WBC UR QL: SIGNIFICANT CHANGE UP

## 2019-10-24 PROCEDURE — 99223 1ST HOSP IP/OBS HIGH 75: CPT

## 2019-10-24 PROCEDURE — 99233 SBSQ HOSP IP/OBS HIGH 50: CPT

## 2019-10-24 PROCEDURE — 71045 X-RAY EXAM CHEST 1 VIEW: CPT | Mod: 26

## 2019-10-24 RX ORDER — ONDANSETRON 8 MG/1
4 TABLET, FILM COATED ORAL EVERY 6 HOURS
Refills: 0 | Status: DISCONTINUED | OUTPATIENT
Start: 2019-10-24 | End: 2019-11-08

## 2019-10-24 RX ORDER — ALBUMIN HUMAN 25 %
100 VIAL (ML) INTRAVENOUS EVERY 12 HOURS
Refills: 0 | Status: COMPLETED | OUTPATIENT
Start: 2019-10-24 | End: 2019-10-26

## 2019-10-24 RX ORDER — DEXTROSE 50 % IN WATER 50 %
25 SYRINGE (ML) INTRAVENOUS ONCE
Refills: 0 | Status: DISCONTINUED | OUTPATIENT
Start: 2019-10-24 | End: 2019-11-08

## 2019-10-24 RX ORDER — MAGNESIUM SULFATE 500 MG/ML
1 VIAL (ML) INJECTION ONCE
Refills: 0 | Status: COMPLETED | OUTPATIENT
Start: 2019-10-24 | End: 2019-10-24

## 2019-10-24 RX ORDER — SODIUM CHLORIDE 9 MG/ML
1000 INJECTION, SOLUTION INTRAVENOUS
Refills: 0 | Status: DISCONTINUED | OUTPATIENT
Start: 2019-10-24 | End: 2019-11-08

## 2019-10-24 RX ORDER — PANTOPRAZOLE SODIUM 20 MG/1
40 TABLET, DELAYED RELEASE ORAL
Refills: 0 | Status: DISCONTINUED | OUTPATIENT
Start: 2019-10-24 | End: 2019-11-05

## 2019-10-24 RX ORDER — TAMSULOSIN HYDROCHLORIDE 0.4 MG/1
0.4 CAPSULE ORAL AT BEDTIME
Refills: 0 | Status: DISCONTINUED | OUTPATIENT
Start: 2019-10-24 | End: 2019-11-08

## 2019-10-24 RX ORDER — GLUCAGON INJECTION, SOLUTION 0.5 MG/.1ML
1 INJECTION, SOLUTION SUBCUTANEOUS ONCE
Refills: 0 | Status: DISCONTINUED | OUTPATIENT
Start: 2019-10-24 | End: 2019-11-08

## 2019-10-24 RX ORDER — IRON SUCROSE 20 MG/ML
200 INJECTION, SOLUTION INTRAVENOUS EVERY 24 HOURS
Refills: 0 | Status: COMPLETED | OUTPATIENT
Start: 2019-10-24 | End: 2019-10-28

## 2019-10-24 RX ORDER — SODIUM CHLORIDE 9 MG/ML
1000 INJECTION INTRAMUSCULAR; INTRAVENOUS; SUBCUTANEOUS
Refills: 0 | Status: DISCONTINUED | OUTPATIENT
Start: 2019-10-24 | End: 2019-10-25

## 2019-10-24 RX ORDER — NYSTATIN CREAM 100000 [USP'U]/G
1 CREAM TOPICAL
Refills: 0 | Status: DISCONTINUED | OUTPATIENT
Start: 2019-10-24 | End: 2019-11-08

## 2019-10-24 RX ORDER — DEXTROSE 50 % IN WATER 50 %
15 SYRINGE (ML) INTRAVENOUS ONCE
Refills: 0 | Status: DISCONTINUED | OUTPATIENT
Start: 2019-10-24 | End: 2019-11-08

## 2019-10-24 RX ORDER — SACCHAROMYCES BOULARDII 250 MG
250 POWDER IN PACKET (EA) ORAL
Refills: 0 | Status: DISCONTINUED | OUTPATIENT
Start: 2019-10-24 | End: 2019-11-07

## 2019-10-24 RX ORDER — INSULIN LISPRO 100/ML
VIAL (ML) SUBCUTANEOUS
Refills: 0 | Status: DISCONTINUED | OUTPATIENT
Start: 2019-10-24 | End: 2019-10-24

## 2019-10-24 RX ORDER — DEXTROSE 50 % IN WATER 50 %
12.5 SYRINGE (ML) INTRAVENOUS ONCE
Refills: 0 | Status: DISCONTINUED | OUTPATIENT
Start: 2019-10-24 | End: 2019-11-08

## 2019-10-24 RX ADMIN — URSODIOL 300 MILLIGRAM(S): 250 TABLET, FILM COATED ORAL at 14:38

## 2019-10-24 RX ADMIN — PANTOPRAZOLE SODIUM 40 MILLIGRAM(S): 20 TABLET, DELAYED RELEASE ORAL at 06:05

## 2019-10-24 RX ADMIN — Medication 1 GRAM(S): at 06:05

## 2019-10-24 RX ADMIN — LACTULOSE 10 GRAM(S): 10 SOLUTION ORAL at 06:06

## 2019-10-24 RX ADMIN — SPIRONOLACTONE 25 MILLIGRAM(S): 25 TABLET, FILM COATED ORAL at 06:05

## 2019-10-24 RX ADMIN — CEFTRIAXONE 100 MILLIGRAM(S): 500 INJECTION, POWDER, FOR SOLUTION INTRAMUSCULAR; INTRAVENOUS at 10:18

## 2019-10-24 RX ADMIN — URSODIOL 300 MILLIGRAM(S): 250 TABLET, FILM COATED ORAL at 22:20

## 2019-10-24 RX ADMIN — Medication 1 GRAM(S): at 11:42

## 2019-10-24 RX ADMIN — Medication 1 GRAM(S): at 22:23

## 2019-10-24 RX ADMIN — Medication 50 MILLILITER(S): at 20:35

## 2019-10-24 RX ADMIN — Medication 250 MILLIGRAM(S): at 18:03

## 2019-10-24 RX ADMIN — LACTULOSE 10 GRAM(S): 10 SOLUTION ORAL at 14:38

## 2019-10-24 RX ADMIN — Medication 1 GRAM(S): at 00:22

## 2019-10-24 RX ADMIN — PANTOPRAZOLE SODIUM 40 MILLIGRAM(S): 20 TABLET, DELAYED RELEASE ORAL at 18:03

## 2019-10-24 RX ADMIN — Medication 40 MILLIGRAM(S): at 06:05

## 2019-10-24 RX ADMIN — HEPARIN SODIUM 7500 UNIT(S): 5000 INJECTION INTRAVENOUS; SUBCUTANEOUS at 06:05

## 2019-10-24 RX ADMIN — TAMSULOSIN HYDROCHLORIDE 0.4 MILLIGRAM(S): 0.4 CAPSULE ORAL at 22:22

## 2019-10-24 RX ADMIN — Medication 100 GRAM(S): at 09:42

## 2019-10-24 RX ADMIN — ONDANSETRON 4 MILLIGRAM(S): 8 TABLET, FILM COATED ORAL at 12:00

## 2019-10-24 RX ADMIN — URSODIOL 300 MILLIGRAM(S): 250 TABLET, FILM COATED ORAL at 06:05

## 2019-10-24 RX ADMIN — SODIUM CHLORIDE 80 MILLILITER(S): 9 INJECTION INTRAMUSCULAR; INTRAVENOUS; SUBCUTANEOUS at 22:25

## 2019-10-24 RX ADMIN — ATORVASTATIN CALCIUM 20 MILLIGRAM(S): 80 TABLET, FILM COATED ORAL at 22:22

## 2019-10-24 RX ADMIN — LACTULOSE 10 GRAM(S): 10 SOLUTION ORAL at 22:21

## 2019-10-24 RX ADMIN — Medication 1000 UNIT(S): at 11:42

## 2019-10-24 RX ADMIN — IRON SUCROSE 110 MILLIGRAM(S): 20 INJECTION, SOLUTION INTRAVENOUS at 22:20

## 2019-10-24 NOTE — CONSULT NOTE ADULT - SUBJECTIVE AND OBJECTIVE BOX
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 Also gives vague h/o abd discomfort and poor appetite. Denies any N/V/D or chest pain. On initial assessment he was found to be hypoglycemic ~29 and had a TMax ~102.6. He was fluid resuscitated, given Abx Zosyn/ Vanco and also one amp D50%, repeat FS ~ 69 for which he received another amp D50. Lactate was 4.1  ICU consulted for fever, persistent hypoglycemia and hypotension. Pt is on Lantus 40U and trajenta 5mg at home which he was compliant with.  He also had a diagnostic paracentesis in the ED today ~ 20cc bloody fluid was drained  Recent admission at [place] earlier this month for fluid overload and weight gain. (22 Oct 2019 20:44)  Also had severe GIB few m ago , needed many bottles of PRBC    PAST MEDICAL & SURGICAL HISTORY:  GIB (gastrointestinal bleeding)  GERD with esophagitis: Gastritis &amp; Non Bleeding Ulcers  Hepatic encephalopathy  Obesity  Fatty liver disease, nonalcoholic  Renal stones: 25 years ago  Hypertension  Neuropathy  Hypercholesteremia  Diabetes  S/P cholecystectomy  No Hx CKD    FAMILY HISTORY:  Family history of type 2 diabetes mellitus  Family history of hypertension  Family history of stomach cancer    Social History:   -smoke   - Alcohol   -    Drugs      REVIEW OF SYSTEMS:  CONSTITUTIONAL: No fever, weight loss/ Had wt gain  + fatigue  EYES: No eye pain, No visual disturbances,   RESPIRATORY: No cough, hemoptysis; - SOB  CARDIOVASCULAR: No chest pain, No palpitations,   Has leg swelling  GASTROINTESTINAL: No abdominal , NVD or GIB  GENITOURINARY: No UTI sx/hematuria  NEUROLOGICAL: No HA/wk/numbness  MUSCULOSKELETAL: No joint pain, No swelling      Vital Signs Last 24 Hrs  T(C): 36.4 (24 Oct 2019 12:23), Max: 37 (23 Oct 2019 16:22)  T(F): 97.5 (24 Oct 2019 12:23), Max: 98.6 (23 Oct 2019 16:22)  HR: 87 (24 Oct 2019 12:00) (86 - 93)  BP: 104/56 (24 Oct 2019 12:00) (88/49 - 106/57)  BP(mean): 77 (24 Oct 2019 12:00) (64 - 78)  RR: 19 (24 Oct 2019 12:00) (11 - 26)  SpO2: 92% (24 Oct 2019 12:00) (92% - 96%)    PHYSICAL EXAM:    GENERAL: NAD, Obese   EYES:  conjunctiva and sclera clear  NECK: Supple, No JVD/Carotid Bruit -ve   NERVOUS SYSTEM:  A/O x3,   Lungs : No rales, No rhonchi,   HEART:  No murmur,  No rub, No gallops  ABDOMEN: Soft, NT/ND BS+ Ascitis ++  EXTREMITIES:  + Peripheral Pulses, + edema  SKIN: No rashes or lesions      LABS:                        6.6    18.99 )-----------( 84       ( 24 Oct 2019 05:25 )             20.2     10-24    132<L>  |  99  |  36.0<H>  ----------------------------<  116<H>  4.3   |  20.0<L>  |  1.69<H>    Ca    8.6      24 Oct 2019 05:25  Phos  4.8     10-24  Mg     1.6     10-24    TPro  5.0<L>  /  Alb  2.4<L>  /  TBili  6.1<H>  /  DBili  x   /  AST  194<H>  /  ALT  51<H>  /  AlkPhos  61  10-24    PT/INR - ( 22 Oct 2019 17:37 )   PT: 22.2 sec;   INR: 1.89 ratio         PTT - ( 22 Oct 2019 17:37 )  PTT:25.0 sec  Urinalysis Basic - ( 22 Oct 2019 21:40 )    Color: Yellow / Appearance: Clear / S.020 / pH: x  Gluc: x / Ketone: Trace  / Bili: Small / Urobili: 1 mg/dL   Blood: x / Protein: 15 mg/dL / Nitrite: Negative   Leuk Esterase: Trace / RBC: 6-10 /HPF / WBC 0-2   Sq Epi: x / Non Sq Epi: Occasional / Bacteria: x      Magnesium, Serum: 1.6 mg/dL (10-24 @ 05:25)  Phosphorus Level, Serum: 4.8 mg/dL (10-24 @ 05:25)      RADIOLOGY & ADDITIONAL TESTS:    MEDICATIONS  (STANDING):  atorvastatin  cefTRIAXone   IVPB  cholecalciferol  dextrose 40% Gel PRN  dextrose 5%.  dextrose 50% Injectable  dextrose 50% Injectable  dextrose 50% Injectable  furosemide   Injectable  glucagon  Injectable PRN  influenza   Vaccine  lactulose Syrup  ondansetron Injectable PRN  pantoprazole  Injectable  rifAXIMin  sucralfate  tamsulosin  ursodiol Capsule

## 2019-10-24 NOTE — CONSULT NOTE ADULT - PROBLEM SELECTOR RECOMMENDATION 2
Elevated MELD, hyponatremia and hypoglycemia  cont ppi bid  monitor LFTS, INR and CR  cont rifaximin bid and cont lactulose qid

## 2019-10-24 NOTE — CONSULT NOTE ADULT - ASSESSMENT
63y old Male with a past medical history significant for DM, HLD, non-alcoholic cirrhosis w/ hepatic encephalopathy (on lactulose), ascites/fluid overload, GIB, secondary to GAVE s/p APC, chronic thrombocytopenia presented to the ED with abdominal discomfort and chills. Has + blood cultures for gr+ bacteremia. Anemia is likely multifactorial -> cirrhosis, dilutional component, PHTN gastropathy. Hyponatremia and hypoglycemia.     MISA + Oliguria - likely ATN d/t epsis &/or pre renal or HRS ; May also have AIN -less likely  Check UA, Ueos Na/Osm  Add Albumin + IVF - NS at 80/H or more as keerthi    Anemia  Hb v low - GI following ; PRBC pending  Add Venofer ; Procrit if creat rises further    NA cirrhosis - transfer for Tx per GI    High Procalcitonin, Lactate improving - on Abx  NS Met acidosis despite high lactate    Supplement Mg
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 Patient came in with sudden onset chills. In the ER patient was febrile to 102.6F, hypoglycemic, in sepsis, admitted to MICU. Was started on Vancomycin and Zosyn. Blood cultures with group B strep.      Group B Strep septicemia   ascites/fluid overload  non-alcoholic cirrhosis      - Blood cultures with group B strep  - Repeat blood cultures ordered/pending  - ? Source  - Check TTE   - CT A/P with cirrhosis   - Continue Ceftriaxone 2gm IV q 24hours  - Trend Fever  - Trend Leukocytosis      Will Follow
63y old Male with a past medical history significant for DM, HLD, non-alcoholic cirrhosis w/ hepatic encephalopathy (on lactulose), ascites/fluid overload, GIB, secondary to GAVE s/p APC, chronic thrombocytopenia presented to the ED with abdominal discomfort and chills. Has + blood cultures for gr+ bacteremia. Anemia is likely multifactorial -> cirrhosis, dilutional component, PHTN gastropathy. Hyponatremia and hypoglycemia.

## 2019-10-24 NOTE — PROGRESS NOTE ADULT - ASSESSMENT
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 Also gives vague h/o abd discomfort and poor appetite. Denies any N/V/D or chest pain. He was found to be hypoglycemic ~29 and had a TMax ~102.6. He was fluid resuscitated, given Abx Zosyn/ Vanco and also one amp D50%, repeat FS ~ 69 for which he received another amp D50. Lactate was 4.1 ICU consulted for fever, persistent hypoglycemia and hypotension. Patient takes Lantus 40U and Tradjenta 5mg at home which he was compliant with; but he has not been eating well. In the ED, he had a diagnostic paracentesis ~ 20cc bloody fluid was drained. Found to have gram positive bacteremia and sepsis.  Downgraded to the hospitalist service for further management.     Group B Septicemia   Source unclear  Continue Ceftriaxone  Repeat blood cultures  Trend WBC count  Procal > 11  TTE pending  Lactate trending down 4.8 --> 3.1; volume resuscitation   Check CT abdomen/pelvis to assess for intrabdominal source of infection   ID consult appreciated    Anemia  -patient with recent GI bleed due to GAVE at [facility]  -hemoglobin down to 6.6 this morning  -transfuse 2 units of PRBCs  -started on IV iron per renal  -monitor H/H closely    Acute Kidney Injury  -broad differential - renal hypoperfusion due to hypovolemia vs HRS vs ischemic ATN  -repeat UA, urine lytes, urine eosinophil  -hold lasix and aldactone  -judicious hydration; start IV albumin + NS after PRBC are completed and monitor for volume overload  -strict I/Os, daily weights  -bladder scan > 600 cc of urine, refusing straight cath therefore re-insert bob and start flomax  -voiding trail when patient is more mobile  -avoid nephrotoxic agents and renally dose medications  -renal consult appreciated    Cirrhosis 2/2 to JEAN   hold diuretics due to renal insufficiency   Continue Rifaximin and lactulose   CT abd/pelvis   GI consult appreciated    Hypoglycemia with the underlying DM-2 with long time insulin use   Lantus 40 units on hold due to hypoglycemia yesterday  monitor accuchecks ACHS  will add back insulin when indicated     HLD  Continue Atrovastatin 20mg     Thrombocytopenia  -likely due to sepsis and cirrhosis  -seen by hem/onc earlier this month at [facility]  -d/c heparin SC and monitor counts closely    Heparin SC - SCDs (thrombocytopenia)    Prognosis guarded.

## 2019-10-24 NOTE — CONSULT NOTE ADULT - SUBJECTIVE AND OBJECTIVE BOX
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 Also gives vague h/o abd discomfort and poor appetite. Denies any N/V/D or chest pain. On initial assessment he was found to be hypoglycemic ~29 and had a TMax ~102.6. He was fluid resuscitated, given Abx Zosyn/ Vanco and also one amp D50%, repeat FS ~ 69 for which he received another amp D50. Lactate was 4.1  ICU consulted for fever, persistent hypoglycemia and hypotension. Pt is on Lantus 40U and trajenta 5mg at home which he was compliant with.  He also had a diagnostic paracentesis in the ED today ~ 20cc bloody fluid was drained  Recent admission at [facility] earlier this month for fluid overload and weight gain. (22 Oct 2019 20:44)    REVIEW OF SYSTEMS:  Constitutional:  No unintentional weight loss, fevers, chills or night sweats	  Eyes: No eye pain, redness, discharge, or proptosis  ENMT: No sore throat, ear pain, mouth sores, or swollen glands in the neck  Respiratory: No dyspnea, cough or wheezing  Cardiovascular: No chest pain, dyspnea on exertion, or orthopnea  Gastrointestinal:	Please see HPI  Genitourinary: No dysuria or hematuria  Neurological:	 No changes in sleep/wake cycle, convulsions, confusion, dizziness or lightheadedness  Psychiatric: No changes in personality or emotional problems   Hematology: No easy bruising   Endocrine: No hot or cold flashes or deepening of voice	  All other review of systems were completed and were otherwise negative save what is reported in the HPI.    PAST MEDICAL/SURGICAL HISTORY:  GIB (gastrointestinal bleeding)  GERD with esophagitis: Gastritis &amp; Non Bleeding Ulcers  Hepatic encephalopathy  Obesity  Fatty liver disease, nonalcoholic  Renal stones: 25 years ago  Hypertension  Neuropathy  Hypercholesteremia  Diabetes  S/P cholecystectomy    SOCIAL HISTORY:  - ILLICIT DRUG USE: Denies    FAMILY HISTORY:  No known history of gastrointestinal or liver disease;  Family history of type 2 diabetes mellitus  Family history of hypertension  Family history of stomach cancer      HOME MEDICATIONS:  aluminum hydroxide-magnesium hydroxide 200 mg-200 mg/5 mL oral suspension: 30 milliliter(s) orally every 4 hours, As needed, Dyspepsia (09 Oct 2019 12:34)  furosemide 20 mg oral tablet: 2 tab(s) orally once a day   (12 Oct 2019 10:51)  gabapentin 100 mg oral capsule: 2 cap(s) orally 4 times a day (09 Oct 2019 12:34)  insulin glargine: 40 unit(s) subcutaneous once a day (12 Oct 2019 10:51)  phytonadione 100 mcg oral tablet: 1 tab(s) orally once a day (09 Oct 2019 12:34)  pravastatin 40 mg oral tablet: 1 tab(s) orally once a day (09 Oct 2019 12:34)  rifAXIMin 550 mg oral tablet: 1 tab(s) orally 2 times a day (09 Oct 2019 12:34)  Vitamin D3 2000 intl units oral tablet: 1 tab(s) orally once a day (09 Oct 2019 12:34)    INPATIENT MEDICATIONS:  MEDICATIONS  (STANDING):  atorvastatin 20 milliGRAM(s) Oral at bedtime  cefTRIAXone   IVPB 2000 milliGRAM(s) IV Intermittent <User Schedule>  cholecalciferol 1000 Unit(s) Oral daily  dextrose 5%. 1000 milliLiter(s) (50 mL/Hr) IV Continuous <Continuous>  dextrose 50% Injectable 12.5 Gram(s) IV Push once  dextrose 50% Injectable 25 Gram(s) IV Push once  dextrose 50% Injectable 25 Gram(s) IV Push once  furosemide   Injectable 40 milliGRAM(s) IV Push daily  influenza   Vaccine 0.5 milliLiter(s) IntraMuscular once  lactulose Syrup 10 Gram(s) Oral every 8 hours  magnesium sulfate  IVPB 1 Gram(s) IV Intermittent once  pantoprazole  Injectable 40 milliGRAM(s) IV Push two times a day  rifAXIMin 550 milliGRAM(s) Oral two times a day  spironolactone 25 milliGRAM(s) Oral daily  sucralfate 1 Gram(s) Oral every 6 hours  ursodiol Capsule 300 milliGRAM(s) Oral three times a day    MEDICATIONS  (PRN):  dextrose 40% Gel 15 Gram(s) Oral once PRN Blood Glucose LESS THAN 70 milliGRAM(s)/deciliter  glucagon  Injectable 1 milliGRAM(s) IntraMuscular once PRN Glucose LESS THAN 70 milligrams/deciliter    ALLERGIES:  codeine (Anaphylaxis)    VITAL SIGNS LAST 24 HOURS:  T(C): 36.4 (24 Oct 2019 08:00), Max: 37 (23 Oct 2019 16:22)  T(F): 97.6 (24 Oct 2019 08:00), Max: 98.6 (23 Oct 2019 16:22)  HR: 86 (24 Oct 2019 08:00) (86 - 93)  BP: 103/59 (24 Oct 2019 08:00) (88/49 - 106/57)  BP(mean): 78 (24 Oct 2019 08:00) (64 - 78)  RR: 11 (24 Oct 2019 08:00) (11 - 26)  SpO2: 95% (24 Oct 2019 08:00) (93% - 97%)      10-23-19 @ 07:01  -  10-24-19 @ 07:00  --------------------------------------------------------  IN: 1930 mL / OUT: 296 mL / NET: 1634 mL        10-23-19 @ 07:01  -  10-24-19 @ 07:00  --------------------------------------------------------  IN: 1930 mL / OUT: 296 mL / NET: 1634 mL          PHYSICAL EXAM:  Constitutional: Well-developed, well-nourished, in no apparent distress  Eyes: Sclerae anicteric, conjunctivae normal  ENMT: Mucus membranes moist, no oropharyngeal thrush noted  Neck: No thyroid nodules appreciated, no significant cervical or supraclavicular lymphadenopathy  Respiratory: Breathing nonlabored; clear to auscultation  Cardiovascular: Regular rate and rhythm  Gastrointestinal: Soft, nontender, nondistended, normoactive bowel sounds  Extremities: No clubbing, cyanosis or edema  Neurological: Alert and oriented to person, place and time; no asterixis  Skin: No jaundice  Lymph Nodes: No significant lymphadenopathy  Musculoskeletal: No significant peripheral atrophy  Psychiatric: Affect and mood appropriate      LABS:                        6.6    18.99 )-----------( 84       ( 24 Oct 2019 05:25 )             20.2     PT/INR - ( 22 Oct 2019 17:37 )   PT: 22.2 sec;   INR: 1.89 ratio         PTT - ( 22 Oct 2019 17:37 )  PTT:25.0 sec  10-24    132<L>  |  99  |  36.0<H>  ----------------------------<  116<H>  4.3   |  20.0<L>  |  1.69<H>    Ca    8.6      24 Oct 2019 05:25  Phos  4.8     10-24  Mg     1.6     10-24    TPro  5.0<L>  /  Alb  2.4<L>  /  TBili  6.1<H>  /  DBili  x   /  AST  194<H>  /  ALT  51<H>  /  AlkPhos  61  10-24    LIVER FUNCTIONS - ( 24 Oct 2019 05:25 )  Alb: 2.4 g/dL / Pro: 5.0 g/dL / ALK PHOS: 61 U/L / ALT: 51 U/L / AST: 194 U/L / GGT: x           Urinalysis Basic - ( 22 Oct 2019 21:40 )    Color: Yellow / Appearance: Clear / S.020 / pH: x  Gluc: x / Ketone: Trace  / Bili: Small / Urobili: 1 mg/dL   Blood: x / Protein: 15 mg/dL / Nitrite: Negative   Leuk Esterase: Trace / RBC: 6-10 /HPF / WBC 0-2   Sq Epi: x / Non Sq Epi: Occasional / Bacteria: x        Culture - Body Fluid with Gram Stain (collected 22 Oct 2019 19:03)  Source: .Body Fluid  Gram Stain (23 Oct 2019 11:43):    Few White blood cells    No organisms seen    Culture - Blood (collected 22 Oct 2019 17:55)  Source: .Blood  Preliminary Report (23 Oct 2019 11:06):    Growth in aerobic and anaerobic bottles: Gram Positive Cocci in Pairs and    Chains    Aerobic Bottle: 08:57 Hours to positivity    Anaerobic Bottle: 08:56 Hours to positivity    ***Blood Panel PCR results on this specimen are available    approximately 3 hours after the Gram stain result.***    Gram stain, PCR, and/or culture results may not always    correspond due to difference in methodologies.    ************************************************************    This PCR assay was performed using [employer].    The following targets are tested for: Enterococcus,    vancomycin resistant enterococci, Listeria monocytogenes,    coagulase negative staphylococci, S. aureus,    methicillin resistant S. aureus, Streptococcus agalactiae    (Group B), S. pneumoniae, S. pyogenes (Group A),    Acinetobacter baumannii, Enterobacter cloacae, E. coli,    Klebsiella oxytoca, K. pneumoniae, Proteus sp.,    Serratia marcescens, Haemophilus influenzae,    Neisseria meningitidis, Pseudomonas aeruginosa, Candida    albicans, C. glabrata, C krusei, C parapsilosis,    C. tropicalis and the KPC resistance gene.    "Due to technical problems, Proteus sp. will Not be reported as part of    the BCID panel until further notice"    .    TYPE: (C=Critical, N=Notification, A=Abnormal) c    TESTS:  _ gs    DATE/TIME CALLED: _ 10/23/2019 09:18:36    CALLED TO: Tk devi rn    READ BACK (2 Patient Identifiers)(Y/N): _ y    READ BACK VALUES (Y/N): _ y    CALLED BY: Tk corona    TYPE: (C=Critical, N=Notification, A=Abnormal) c    TESTS:  _ BSB    DATE/TIME CALLED: _10/ 11:06:21    CALLED TO: Tk devi rn    READ BACK (2 Patient Identifiers)(Y/N): _ y    READ BACK VALUES (Y/N): _ y    CALLED BY: Tk corona    Culture - Blood (collected 22 Oct 2019 17:55)  Source: .Blood  Preliminary Report (23 Oct 2019 09:21):    Growth in aerobic and anaerobic bottles: Gram Positive Cocci in Pairs and    Chains    Aerobic Bottle: 09:17 Hours to positivity    Anaerobic Bottle: 09:27 Hours to positivity    .    TYPE: (C=Critical, N=Notification, A=Abnormal) c    TESTS:  _ gs    DATE/TIME CALLED: _ 10/23/2019 09:20:36    CALLED TO: Tk devi rn    READ BACK (2 Patient Identifiers)(Y/N): _ y    READ BACK VALUES (Y/N): _ y    CALLED BY: Tk corona        IMAGING:  < from: CT Abdomen and Pelvis No Cont (10.23.19 @ 09:00) >  FINDINGS:    LOWER CHEST: Small bilateral pleural effusions which were have increased   and bibasilar atelectasis.    LIVER: Slightly nodular in contour with a prominent left lobe raising   concern for cirrhosis. Please correlate clinically.  BILE DUCTS: Normal caliber.  GALLBLADDER: Status post cholecystectomy  SPLEEN: Mildly prominent measuring up to 13.3 cm. This may reflect portal   hypertension. Please correlate clinically.  PANCREAS: Within normal limits.  ADRENALS: Within normal limits.  KIDNEYS/URETERS: 4 mm nonobstructing stone in the lower pole of the left   kidney.    BLADDER: Collapsed and contains a Beasley catheter.  REPRODUCTIVE ORGANS: Grossly unremarkable.    BOWEL: No bowel obstruction. Appendix unremarkable.  PERITONEUM: Mild to moderate ascites is unchanged.  VESSELS: Varices. Large varices are seen to the left of the abdominal   aorta  RETROPERITONEUM/LYMPH NODES: No lymphadenopathy.    ABDOMINAL WALL: Subcutaneous edema.  BONES: Degenerative changes of bone.    IMPRESSION:   Findings raising concern for cirrhosis. Mildly prominent spleen and   varices may reflect portal hypertension. Please correlate clinically..   Ascites which has not significantly changed. Small bilateral pleural   effusions has increased.    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 He denies melena, hematochezia, nausea and vomiting. He was hypoglycemic ~29 and febrile in the ED. He was given IVF, Zosyn and Vanco and two amps of D50%. Pt is on Lantus 40U and Tradjenta 5mg.  He was recently admitted to [facility] and he had a diagnostic paracentesis. He was treated for hepatic encephalopathy.  He recently had an EGD for a duodenal ulcer bleed earlier this year and an EGD on 10/11 showed no varices, mild/moderate GAVE in the antrum, s/p apc and duodenal avm, bleeding, s/p apc. In the ED his hgb was 8.7 and has trended to 6.6.     REVIEW OF SYSTEMS:  Constitutional:  No unintentional weight loss, fevers, chills or night sweats	  Eyes: No eye pain, redness, discharge, or proptosis  ENMT: No sore throat, ear pain, mouth sores, or swollen glands in the neck  Respiratory: No dyspnea, cough or wheezing  Cardiovascular: No chest pain, dyspnea on exertion, or orthopnea  Gastrointestinal:	Please see HPI  Genitourinary: No dysuria or hematuria  Neurological:	 No changes in sleep/wake cycle, convulsions, confusion, dizziness or lightheadedness  Psychiatric: No changes in personality or emotional problems   Hematology: No easy bruising   Endocrine: No hot or cold flashes or deepening of voice	  All other review of systems were completed and were otherwise negative save what is reported in the HPI.    PAST MEDICAL/SURGICAL HISTORY:  GIB (gastrointestinal bleeding)  GERD with esophagitis: Gastritis &amp; Non Bleeding Ulcers  Hepatic encephalopathy  Obesity  Fatty liver disease, nonalcoholic  Renal stones: 25 years ago  Hypertension  Neuropathy  Hypercholesteremia  Diabetes  S/P cholecystectomy    SOCIAL HISTORY:  - ILLICIT DRUG USE: Denies    FAMILY HISTORY:  No known history of gastrointestinal or liver disease;  Family history of type 2 diabetes mellitus  Family history of hypertension  Family history of stomach cancer      HOME MEDICATIONS:  aluminum hydroxide-magnesium hydroxide 200 mg-200 mg/5 mL oral suspension: 30 milliliter(s) orally every 4 hours, As needed, Dyspepsia (09 Oct 2019 12:34)  furosemide 20 mg oral tablet: 2 tab(s) orally once a day   (12 Oct 2019 10:51)  gabapentin 100 mg oral capsule: 2 cap(s) orally 4 times a day (09 Oct 2019 12:34)  insulin glargine: 40 unit(s) subcutaneous once a day (12 Oct 2019 10:51)  phytonadione 100 mcg oral tablet: 1 tab(s) orally once a day (09 Oct 2019 12:34)  pravastatin 40 mg oral tablet: 1 tab(s) orally once a day (09 Oct 2019 12:34)  rifAXIMin 550 mg oral tablet: 1 tab(s) orally 2 times a day (09 Oct 2019 12:34)  Vitamin D3 2000 intl units oral tablet: 1 tab(s) orally once a day (09 Oct 2019 12:34)    INPATIENT MEDICATIONS:  MEDICATIONS  (STANDING):  atorvastatin 20 milliGRAM(s) Oral at bedtime  cefTRIAXone   IVPB 2000 milliGRAM(s) IV Intermittent <User Schedule>  cholecalciferol 1000 Unit(s) Oral daily  dextrose 5%. 1000 milliLiter(s) (50 mL/Hr) IV Continuous <Continuous>  dextrose 50% Injectable 12.5 Gram(s) IV Push once  dextrose 50% Injectable 25 Gram(s) IV Push once  dextrose 50% Injectable 25 Gram(s) IV Push once  furosemide   Injectable 40 milliGRAM(s) IV Push daily  influenza   Vaccine 0.5 milliLiter(s) IntraMuscular once  lactulose Syrup 10 Gram(s) Oral every 8 hours  magnesium sulfate  IVPB 1 Gram(s) IV Intermittent once  pantoprazole  Injectable 40 milliGRAM(s) IV Push two times a day  rifAXIMin 550 milliGRAM(s) Oral two times a day  spironolactone 25 milliGRAM(s) Oral daily  sucralfate 1 Gram(s) Oral every 6 hours  ursodiol Capsule 300 milliGRAM(s) Oral three times a day    MEDICATIONS  (PRN):  dextrose 40% Gel 15 Gram(s) Oral once PRN Blood Glucose LESS THAN 70 milliGRAM(s)/deciliter  glucagon  Injectable 1 milliGRAM(s) IntraMuscular once PRN Glucose LESS THAN 70 milligrams/deciliter    ALLERGIES:  codeine (Anaphylaxis)    VITAL SIGNS LAST 24 HOURS:  T(C): 36.4 (24 Oct 2019 08:00), Max: 37 (23 Oct 2019 16:22)  T(F): 97.6 (24 Oct 2019 08:00), Max: 98.6 (23 Oct 2019 16:22)  HR: 86 (24 Oct 2019 08:00) (86 - 93)  BP: 103/59 (24 Oct 2019 08:00) (88/49 - 106/57)  BP(mean): 78 (24 Oct 2019 08:00) (64 - 78)  RR: 11 (24 Oct 2019 08:00) (11 - 26)  SpO2: 95% (24 Oct 2019 08:00) (93% - 97%)      10-23-19 @ 07:01  -  10-24-19 @ 07:00  --------------------------------------------------------  IN: 1930 mL / OUT: 296 mL / NET: 1634 mL        10-23-19 @ 07:01  -  10-24-19 @ 07:00  --------------------------------------------------------  IN: 1930 mL / OUT: 296 mL / NET: 1634 mL          PHYSICAL EXAM:  Constitutional: obese, in no apparent distress  Eyes: Sclerae icteric  ENMT: Mucus membranes moist, no oropharyngeal thrush noted  Neck: No thyroid nodules appreciated, no significant cervical or supraclavicular lymphadenopathy  Respiratory: Breathing nonlabored; clear to auscultation  Cardiovascular: Regular rate and rhythm  Gastrointestinal: Soft, nontender, nondistended, normoactive bowel sounds  Rectal: liquid brown stool  Extremities: 2 + edema  Neurological: Alert and oriented to person, place and time; no asterixis  Skin: No jaundice  Lymph Nodes: No significant lymphadenopathy  Musculoskeletal: No significant peripheral atrophy  Psychiatric: Affect and mood appropriate      LABS:                        6.6    18.99 )-----------( 84       ( 24 Oct 2019 05:25 )             20.2     PT/INR - ( 22 Oct 2019 17:37 )   PT: 22.2 sec;   INR: 1.89 ratio         PTT - ( 22 Oct 2019 17:37 )  PTT:25.0 sec  10-    132<L>  |  99  |  36.0<H>  ----------------------------<  116<H>  4.3   |  20.0<L>  |  1.69<H>    Ca    8.6      24 Oct 2019 05:25  Phos  4.8     10-  Mg     1.6     10-    TPro  5.0<L>  /  Alb  2.4<L>  /  TBili  6.1<H>  /  DBili  x   /  AST  194<H>  /  ALT  51<H>  /  AlkPhos  61  10-24    LIVER FUNCTIONS - ( 24 Oct 2019 05:25 )  Alb: 2.4 g/dL / Pro: 5.0 g/dL / ALK PHOS: 61 U/L / ALT: 51 U/L / AST: 194 U/L / GGT: x           Urinalysis Basic - ( 22 Oct 2019 21:40 )    Color: Yellow / Appearance: Clear / S.020 / pH: x  Gluc: x / Ketone: Trace  / Bili: Small / Urobili: 1 mg/dL   Blood: x / Protein: 15 mg/dL / Nitrite: Negative   Leuk Esterase: Trace / RBC: 6-10 /HPF / WBC 0-2   Sq Epi: x / Non Sq Epi: Occasional / Bacteria: x        Culture - Body Fluid with Gram Stain (collected 22 Oct 2019 19:03)  Source: .Body Fluid  Gram Stain (23 Oct 2019 11:43):    Few White blood cells    No organisms seen    Culture - Blood (collected 22 Oct 2019 17:55)  Source: .Blood  Preliminary Report (23 Oct 2019 11:06):    Growth in aerobic and anaerobic bottles: Gram Positive Cocci in Pairs and    Chains    Aerobic Bottle: 08:57 Hours to positivity    Anaerobic Bottle: 08:56 Hours to positivity    ***Blood Panel PCR results on this specimen are available    approximately 3 hours after the Gram stain result.***    Gram stain, PCR, and/or culture results may not always    correspond due to difference in methodologies.    ************************************************************    This PCR assay was performed using [employer]    The following targets are tested for: Enterococcus,    vancomycin resistant enterococci, Listeria monocytogenes,    coagulase negative staphylococci, S. aureus,    methicillin resistant S. aureus, Streptococcus agalactiae    (Group B), S. pneumoniae, S. pyogenes (Group A),    Acinetobacter baumannii, Enterobacter cloacae, E. coli,    Klebsiella oxytoca, K. pneumoniae, Proteus sp.,    Serratia marcescens, Haemophilus influenzae,    Neisseria meningitidis, Pseudomonas aeruginosa, Candida    albicans, C. glabrata, C krusei, C parapsilosis,    C. tropicalis and the KPC resistance gene.    "Due to technical problems, Proteus sp. will Not be reported as part of    the BCID panel until further notice"    .    TYPE: (C=Critical, N=Notification, A=Abnormal) c    TESTS:  _ gs    DATE/TIME CALLED: _ 10/23/2019 09:18:36    CALLED TO: Tk devi rn    READ BACK (2 Patient Identifiers)(Y/N): _ y    READ BACK VALUES (Y/N): _ y    CALLED BY: Tk corona    TYPE: (C=Critical, N=Notification, A=Abnormal) c    TESTS:  _ BSB    DATE/TIME CALLED: _10/ 11:06:21    CALLED TO: Tk devi rn    READ BACK (2 Patient Identifiers)(Y/N): _ y    READ BACK VALUES (Y/N): _ y    CALLED BY: Tk corona    Culture - Blood (collected 22 Oct 2019 17:55)  Source: .Blood  Preliminary Report (23 Oct 2019 09:21):    Growth in aerobic and anaerobic bottles: Gram Positive Cocci in Pairs and    Chains    Aerobic Bottle: 09:17 Hours to positivity    Anaerobic Bottle: 09:27 Hours to positivity    .    TYPE: (C=Critical, N=Notification, A=Abnormal) c    TESTS:  _ gs    DATE/TIME CALLED: _ 10/23/2019 09:20:36    CALLED TO: Tk devi rn    READ BACK (2 Patient Identifiers)(Y/N): _ y    READ BACK VALUES (Y/N): _ y    CALLED BY: Tk corona        IMAGING:  < from: CT Abdomen and Pelvis No Cont (10.23. @ 09:00) >  FINDINGS:    LOWER CHEST: Small bilateral pleural effusions which were have increased   and bibasilar atelectasis.    LIVER: Slightly nodular in contour with a prominent left lobe raising   concern for cirrhosis. Please correlate clinically.  BILE DUCTS: Normal caliber.  GALLBLADDER: Status post cholecystectomy  SPLEEN: Mildly prominent measuring up to 13.3 cm. This may reflect portal   hypertension. Please correlate clinically.  PANCREAS: Within normal limits.  ADRENALS: Within normal limits.  KIDNEYS/URETERS: 4 mm nonobstructing stone in the lower pole of the left   kidney.    BLADDER: Collapsed and contains a Beasley catheter.  REPRODUCTIVE ORGANS: Grossly unremarkable.    BOWEL: No bowel obstruction. Appendix unremarkable.  PERITONEUM: Mild to moderate ascites is unchanged.  VESSELS: Varices. Large varices are seen to the left of the abdominal   aorta  RETROPERITONEUM/LYMPH NODES: No lymphadenopathy.    ABDOMINAL WALL: Subcutaneous edema.  BONES: Degenerative changes of bone.    IMPRESSION:   Findings raising concern for cirrhosis. Mildly prominent spleen and   varices may reflect portal hypertension. Please correlate clinically..   Ascites which has not significantly changed. Small bilateral pleural   effusions has increased.    < end of copied text >

## 2019-10-24 NOTE — CONSULT NOTE ADULT - SUBJECTIVE AND OBJECTIVE BOX
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 Patient came in with sudden onset chills. In the ER patient was febrile to 102.6F, hypoglycemic, in sepsis, admitted to MICU. Was started on Vancomycin and Zosyn. Blood cultures with group B strep. ID input requested.       Past Medical & Surgical Hx:  GIB (gastrointestinal bleeding)  GERD with esophagitis: Gastritis &amp; Non Bleeding Ulcers  Hepatic encephalopathy  Obesity  Fatty liver disease, nonalcoholic  Renal stones: 25 years ago  Hypertension  Neuropathy  Hypercholesteremia  Diabetes  S/P cholecystectomy      Social Hx:  Denies smoking ETOH or drug use       FAMILY HISTORY:  Family history of type 2 diabetes mellitus - mother  Family history of hypertension - Father  Family history of stomach cancer - Father       Allergies  codeine (Anaphylaxis)      Antibiotics:   cefTRIAXone   IVPB 2000 milliGRAM(s) IV Intermittent <User Schedule>       REVIEW OF SYSTEMS:  CONSTITUTIONAL:  No Fever. + chills  HEENT:  No diplopia or blurred vision.  No earache, sore throat or runny nose.  CARDIOVASCULAR:  No pressure, squeezing, strangling, tightness, heaviness or aching about the chest, neck, axilla or epigastrium.  RESPIRATORY:  No cough, shortness of breath  GASTROINTESTINAL:  No nausea, vomiting or diarrhea.  GENITOURINARY:  No dysuria, frequency or urgency.   MUSCULOSKELETAL:  no joint aches, no muscle pain  SKIN:  No change in skin, hair or nails.  NEUROLOGIC:  No Headaches, seizures   PSYCHIATRIC:  No disorder of thought or mood.  ENDOCRINE:  No heat or cold intolerance  HEMATOLOGICAL:  No easy bruising or bleeding.       Physical Exam:  Vital Signs Last 24 Hrs  T(C): 36.4 (24 Oct 2019 12:23), Max: 37 (23 Oct 2019 16:22)  T(F): 97.5 (24 Oct 2019 12:23), Max: 98.6 (23 Oct 2019 16:22)  HR: 87 (24 Oct 2019 12:00) (86 - 93)  BP: 104/56 (24 Oct 2019 12:00) (88/49 - 106/57)  BP(mean): 77 (24 Oct 2019 12:00) (64 - 78)  RR: 19 (24 Oct 2019 12:00) (11 - 26)  SpO2: 92% (24 Oct 2019 12:00) (92% - 96%)      GEN: NAD, pleasant  HEENT: normocephalic and atraumatic. EOMI. PERRL.  Anicteric  NECK: Supple.   LUNGS: Clear to auscultation.  HEART: Regular rate and rhythm   ABDOMEN: Soft, nontender, and nondistended.  Positive bowel sounds.    : No CVA tenderness  EXTREMITIES: Without any edema.  MSK: No joint swelling  NEUROLOGIC: No Focal Deficits  PSYCHIATRIC: Appropriate affect .  SKIN: No Rash      Labs:  10    132<L>  |  99  |  36.0<H>  ----------------------------<  116<H>  4.3   |  20.0<L>  |  1.69<H>    Ca    8.6      24 Oct 2019 05:25  Phos  4.8     10-24  Mg     1.6     10-24    TPro  5.0<L>  /  Alb  2.4<L>  /  TBili  6.1<H>  /  DBili  x   /  AST  194<H>  /  ALT  51<H>  /  AlkPhos  61  10-24                          6.6    18.99 )-----------( 84       ( 24 Oct 2019 05:25 )             20.2       PT/INR - ( 22 Oct 2019 17:37 )   PT: 22.2 sec;   INR: 1.89 ratio         PTT - ( 22 Oct 2019 17:37 )  PTT:25.0 sec  Urinalysis Basic - ( 22 Oct 2019 21:40 )    Color: Yellow / Appearance: Clear / S.020 / pH: x  Gluc: x / Ketone: Trace  / Bili: Small / Urobili: 1 mg/dL   Blood: x / Protein: 15 mg/dL / Nitrite: Negative   Leuk Esterase: Trace / RBC: 6-10 /HPF / WBC 0-2   Sq Epi: x / Non Sq Epi: Occasional / Bacteria: x      LIVER FUNCTIONS - ( 24 Oct 2019 05:25 )  Alb: 2.4 g/dL / Pro: 5.0 g/dL / ALK PHOS: 61 U/L / ALT: 51 U/L / AST: 194 U/L / GGT: x             ABG - ( 22 Oct 2019 17:48 )  pH, Arterial: 7.47  pH, Blood: x     /  pCO2: 33    /  pO2: 72    / HCO3: 25    / Base Excess: 0.2   /  SaO2: 96          RECENT CULTURES:  10-22 @ 21:39 .Urine     No growth      10-22 @ 19:18    RVP  NotDetec      10-22 @ 19:03 .Body Fluid     No growth at 1 day.  Culture in progress  Few White blood cells  No organisms seen      10-22 @ 17:55 .Blood Blood Culture PCR    Growth in aerobic and anaerobic bottles: Streptococcus agalactiae (Group  B) Susceptibility to follow.  Aerobic Bottle: 09:17 Hours to positivity  Anaerobic Bottle: 09:27 Hours to positivity      < from: CT Abdomen and Pelvis No Cont (10.23.19 @ 09:00) >  EXAM:  CT ABDOMEN AND PELVIS                          PROCEDURE DATE:  10/23/2019          INTERPRETATION:  CLINICAL INFORMATION: Sepsis of unknown source. Elevated   bilirubin.    COMPARISON: CT scan of the abdomen and pelvis from 10/8/2019    PROCEDURE:   CT of the Abdomen and Pelvis was performed without intravenous contrast.   Intravenous contrast: None. This limits evaluation of vascular structures.  Oral contrast: None.  Sagittal and coronal reformats were performed.    FINDINGS:    LOWER CHEST: Small bilateral pleural effusions which were have increased   and bibasilar atelectasis.    LIVER: Slightly nodular in contour with a prominent left lobe raising   concern for cirrhosis. Please correlate clinically.  BILE DUCTS: Normal caliber.  GALLBLADDER: Status post cholecystectomy  SPLEEN: Mildly prominent measuring up to 13.3 cm. This may reflect portal   hypertension. Please correlate clinically.  PANCREAS: Within normal limits.  ADRENALS: Within normal limits.  KIDNEYS/URETERS: 4 mm nonobstructing stone in the lower pole of the left   kidney.    BLADDER: Collapsed and contains a Beasley catheter.  REPRODUCTIVE ORGANS: Grossly unremarkable.    BOWEL: No bowel obstruction. Appendix unremarkable.  PERITONEUM: Mild to moderate ascites is unchanged.  VESSELS: Varices. Large varices are seen to the left of the abdominal   aorta  RETROPERITONEUM/LYMPH NODES: No lymphadenopathy.    ABDOMINAL WALL: Subcutaneous edema.  BONES: Degenerative changes of bone.    IMPRESSION:     Findings raising concern for cirrhosis. Mildly prominent spleen and   varices may reflect portal hypertension. Please correlate clinically..   Ascites which has not significantly changed. Small bilateral pleural   effusions has increased.    < end of copied text >

## 2019-10-24 NOTE — PROVIDER CONTACT NOTE (CRITICAL VALUE NOTIFICATION) - ACTION/TREATMENT ORDERED:
Continue to monitor
no orders given
no orders given will continue to monitor
no additional interventions ordered at this time.
send type and screen  Dr. Curtis will get consent from patient

## 2019-10-24 NOTE — PROGRESS NOTE ADULT - SUBJECTIVE AND OBJECTIVE BOX
CHIEF COMPLAINT/INTERVAL HISTORY:    Patient is a 63y old  Male who presents with a chief complaint of Persistent hypoglycemia (24 Oct 2019 14:35)    SUBJECTIVE & OBJECTIVE: Pt seen and examined at bedside. No overnight events. Patient with worsening anemia; consent obtained for PRBC. Worsening renal function; renal consulted. GI for EGD. Patient complaining of nausea but otherwise denies any new complaints.     ROS: No chest pain, palpitations, SOB, light headedness, dizziness, headache, nausea/vomiting, fevers/chills, abdominal pain, dysuria or increased urinary frequency.    ICU Vital Signs Last 24 Hrs  T(C): 36.4 (24 Oct 2019 12:23), Max: 37 (23 Oct 2019 20:00)  T(F): 97.5 (24 Oct 2019 12:23), Max: 98.6 (23 Oct 2019 20:00)  HR: 87 (24 Oct 2019 12:00) (86 - 93)  BP: 104/56 (24 Oct 2019 12:00) (88/49 - 104/56)  BP(mean): 77 (24 Oct 2019 12:00) (64 - 78)  RR: 19 (24 Oct 2019 12:00) (11 - 26)  SpO2: 92% (24 Oct 2019 12:00) (92% - 96%)    MEDICATIONS  (STANDING):  albumin human 25% IVPB 100 milliLiter(s) IV Intermittent every 12 hours  atorvastatin 20 milliGRAM(s) Oral at bedtime  cefTRIAXone   IVPB 2000 milliGRAM(s) IV Intermittent <User Schedule>  cholecalciferol 1000 Unit(s) Oral daily  dextrose 5%. 1000 milliLiter(s) (50 mL/Hr) IV Continuous <Continuous>  dextrose 50% Injectable 12.5 Gram(s) IV Push once  dextrose 50% Injectable 25 Gram(s) IV Push once  dextrose 50% Injectable 25 Gram(s) IV Push once  influenza   Vaccine 0.5 milliLiter(s) IntraMuscular once  iron sucrose IVPB 200 milliGRAM(s) IV Intermittent every 24 hours  lactulose Syrup 10 Gram(s) Oral every 8 hours  pantoprazole  Injectable 40 milliGRAM(s) IV Push two times a day  rifAXIMin 550 milliGRAM(s) Oral two times a day  saccharomyces boulardii 250 milliGRAM(s) Oral two times a day  sodium chloride 0.9%. 1000 milliLiter(s) (80 mL/Hr) IV Continuous <Continuous>  sucralfate 1 Gram(s) Oral every 6 hours  tamsulosin 0.4 milliGRAM(s) Oral at bedtime  ursodiol Capsule 300 milliGRAM(s) Oral three times a day    MEDICATIONS  (PRN):  dextrose 40% Gel 15 Gram(s) Oral once PRN Blood Glucose LESS THAN 70 milliGRAM(s)/deciliter  glucagon  Injectable 1 milliGRAM(s) IntraMuscular once PRN Glucose LESS THAN 70 milligrams/deciliter  ondansetron Injectable 4 milliGRAM(s) IV Push every 6 hours PRN Nausea and/or Vomiting      LABS:                        6.6    18.99 )-----------( 84       ( 24 Oct 2019 05:25 )             20.2     10-24    132<L>  |  99  |  36.0<H>  ----------------------------<  116<H>  4.3   |  20.0<L>  |  1.69<H>    Ca    8.6      24 Oct 2019 05:25  Phos  4.8     10-24  Mg     1.6     10-24    TPro  5.0<L>  /  Alb  2.4<L>  /  TBili  6.1<H>  /  DBili  x   /  AST  194<H>  /  ALT  51<H>  /  AlkPhos  61  10-24    PT/INR - ( 22 Oct 2019 17:37 )   PT: 22.2 sec;   INR: 1.89 ratio         PTT - ( 22 Oct 2019 17:37 )  PTT:25.0 sec  Urinalysis Basic - ( 22 Oct 2019 21:40 )    Color: Yellow / Appearance: Clear / S.020 / pH: x  Gluc: x / Ketone: Trace  / Bili: Small / Urobili: 1 mg/dL   Blood: x / Protein: 15 mg/dL / Nitrite: Negative   Leuk Esterase: Trace / RBC: 6-10 /HPF / WBC 0-2   Sq Epi: x / Non Sq Epi: Occasional / Bacteria: x        CAPILLARY BLOOD GLUCOSE      POCT Blood Glucose.: 126 mg/dL (24 Oct 2019 08:32)  POCT Blood Glucose.: 137 mg/dL (23 Oct 2019 21:52)      PHYSICAL EXAM:    GENERAL: elderly male, chronically ill appearing, laying in bed, NAD  HEAD:  Atraumatic, Normocephalic  EYES: EOMI, PERRLA, conjunctiva and sclera clear  ENMT: Moist mucous membranes  NECK: Supple   NERVOUS SYSTEM:  Alert & Oriented X3   CHEST/LUNG: coarse breath sounds  HEART: Regular rate and rhythm; + S1/S2, + Murmur  ABDOMEN: Soft, Nontender, distended; Bowel sounds present  EXTREMITIES:  ++ LE edema

## 2019-10-24 NOTE — CONSULT NOTE ADULT - PROBLEM SELECTOR RECOMMENDATION 9
JEAN cirrhosis, ascites and LE edema  cont diuretics, monitor CR  monitor I's and O's  transfusion planned  Consider albumin 50 mg IV if Cr and I's and O's don't improve after transfusion  would benefit from Hepatology transplant referral as an out patient

## 2019-10-25 DIAGNOSIS — K74.60 UNSPECIFIED CIRRHOSIS OF LIVER: ICD-10-CM

## 2019-10-25 DIAGNOSIS — K92.2 GASTROINTESTINAL HEMORRHAGE, UNSPECIFIED: ICD-10-CM

## 2019-10-25 DIAGNOSIS — K72.90 HEPATIC FAILURE, UNSPECIFIED WITHOUT COMA: ICD-10-CM

## 2019-10-25 LAB
ALBUMIN SERPL ELPH-MCNC: 2.5 G/DL — LOW (ref 3.3–5.2)
ALP SERPL-CCNC: 76 U/L — SIGNIFICANT CHANGE UP (ref 40–120)
ALT FLD-CCNC: 58 U/L — HIGH
AMMONIA BLD-MCNC: 49 UMOL/L — SIGNIFICANT CHANGE UP (ref 11–55)
ANION GAP SERPL CALC-SCNC: 8 MMOL/L — SIGNIFICANT CHANGE UP (ref 5–17)
APTT BLD: 44.9 SEC — HIGH (ref 27.5–36.3)
AST SERPL-CCNC: 178 U/L — HIGH
BASE EXCESS BLDA CALC-SCNC: 1.8 MMOL/L — SIGNIFICANT CHANGE UP (ref -2–2)
BASOPHILS # BLD AUTO: 0.04 K/UL — SIGNIFICANT CHANGE UP (ref 0–0.2)
BASOPHILS NFR BLD AUTO: 0.2 % — SIGNIFICANT CHANGE UP (ref 0–2)
BILIRUB DIRECT SERPL-MCNC: 3.8 MG/DL — HIGH (ref 0–0.3)
BILIRUB INDIRECT FLD-MCNC: 2.6 MG/DL — HIGH (ref 0.2–1)
BILIRUB SERPL-MCNC: 6.4 MG/DL — HIGH (ref 0.4–2)
BLOOD GAS COMMENTS ARTERIAL: SIGNIFICANT CHANGE UP
BUN SERPL-MCNC: 47 MG/DL — HIGH (ref 8–20)
CALCIUM SERPL-MCNC: 8.6 MG/DL — SIGNIFICANT CHANGE UP (ref 8.6–10.2)
CHLORIDE SERPL-SCNC: 99 MMOL/L — SIGNIFICANT CHANGE UP (ref 98–107)
CO2 SERPL-SCNC: 24 MMOL/L — SIGNIFICANT CHANGE UP (ref 22–29)
CREAT SERPL-MCNC: 1.82 MG/DL — HIGH (ref 0.5–1.3)
CULTURE RESULTS: SIGNIFICANT CHANGE UP
CULTURE RESULTS: SIGNIFICANT CHANGE UP
EOSINOPHIL # BLD AUTO: 0.21 K/UL — SIGNIFICANT CHANGE UP (ref 0–0.5)
EOSINOPHIL NFR BLD AUTO: 1.3 % — SIGNIFICANT CHANGE UP (ref 0–6)
GAS PNL BLDA: SIGNIFICANT CHANGE UP
GLUCOSE BLDC GLUCOMTR-MCNC: 106 MG/DL — HIGH (ref 70–99)
GLUCOSE BLDC GLUCOMTR-MCNC: 112 MG/DL — HIGH (ref 70–99)
GLUCOSE BLDC GLUCOMTR-MCNC: 125 MG/DL — HIGH (ref 70–99)
GLUCOSE BLDC GLUCOMTR-MCNC: 134 MG/DL — HIGH (ref 70–99)
GLUCOSE SERPL-MCNC: 113 MG/DL — SIGNIFICANT CHANGE UP (ref 70–115)
HCO3 BLDA-SCNC: 26 MMOL/L — SIGNIFICANT CHANGE UP (ref 20–26)
HCT VFR BLD CALC: 23.7 % — LOW (ref 39–50)
HGB BLD-MCNC: 7.9 G/DL — LOW (ref 13–17)
HOROWITZ INDEX BLDA+IHG-RTO: SIGNIFICANT CHANGE UP
IMM GRANULOCYTES NFR BLD AUTO: 1.1 % — SIGNIFICANT CHANGE UP (ref 0–1.5)
INR BLD: 2.07 RATIO — HIGH (ref 0.88–1.16)
LACTATE SERPL-SCNC: 1.6 MMOL/L — SIGNIFICANT CHANGE UP (ref 0.5–2)
LYMPHOCYTES # BLD AUTO: 1.1 K/UL — SIGNIFICANT CHANGE UP (ref 1–3.3)
LYMPHOCYTES # BLD AUTO: 6.8 % — LOW (ref 13–44)
MAGNESIUM SERPL-MCNC: 1.8 MG/DL — SIGNIFICANT CHANGE UP (ref 1.6–2.6)
MCHC RBC-ENTMCNC: 32.8 PG — SIGNIFICANT CHANGE UP (ref 27–34)
MCHC RBC-ENTMCNC: 33.3 GM/DL — SIGNIFICANT CHANGE UP (ref 32–36)
MCV RBC AUTO: 98.3 FL — SIGNIFICANT CHANGE UP (ref 80–100)
METHOD TYPE: SIGNIFICANT CHANGE UP
METHOD TYPE: SIGNIFICANT CHANGE UP
MONOCYTES # BLD AUTO: 1.16 K/UL — HIGH (ref 0–0.9)
MONOCYTES NFR BLD AUTO: 7.2 % — SIGNIFICANT CHANGE UP (ref 2–14)
NEUTROPHILS # BLD AUTO: 13.49 K/UL — HIGH (ref 1.8–7.4)
NEUTROPHILS NFR BLD AUTO: 83.4 % — HIGH (ref 43–77)
ORGANISM # SPEC MICROSCOPIC CNT: SIGNIFICANT CHANGE UP
PCO2 BLDA: 42 MMHG — SIGNIFICANT CHANGE UP (ref 35–45)
PH BLDA: 7.41 — SIGNIFICANT CHANGE UP (ref 7.35–7.45)
PHOSPHATE SERPL-MCNC: 3.6 MG/DL — SIGNIFICANT CHANGE UP (ref 2.4–4.7)
PLATELET # BLD AUTO: 82 K/UL — LOW (ref 150–400)
PO2 BLDA: 60 MMHG — LOW (ref 83–108)
POTASSIUM SERPL-MCNC: 3.9 MMOL/L — SIGNIFICANT CHANGE UP (ref 3.5–5.3)
POTASSIUM SERPL-SCNC: 3.9 MMOL/L — SIGNIFICANT CHANGE UP (ref 3.5–5.3)
PROT SERPL-MCNC: 5.1 G/DL — LOW (ref 6.6–8.7)
PROTHROM AB SERPL-ACNC: 24.4 SEC — HIGH (ref 10–12.9)
RBC # BLD: 2.41 M/UL — LOW (ref 4.2–5.8)
RBC # FLD: 18.4 % — HIGH (ref 10.3–14.5)
SAO2 % BLDA: 91 % — LOW (ref 95–99)
SODIUM SERPL-SCNC: 131 MMOL/L — LOW (ref 135–145)
SPECIMEN SOURCE: SIGNIFICANT CHANGE UP
SPECIMEN SOURCE: SIGNIFICANT CHANGE UP
WBC # BLD: 16.17 K/UL — HIGH (ref 3.8–10.5)
WBC # FLD AUTO: 16.17 K/UL — HIGH (ref 3.8–10.5)

## 2019-10-25 PROCEDURE — 99232 SBSQ HOSP IP/OBS MODERATE 35: CPT

## 2019-10-25 PROCEDURE — 99233 SBSQ HOSP IP/OBS HIGH 50: CPT

## 2019-10-25 PROCEDURE — 93306 TTE W/DOPPLER COMPLETE: CPT | Mod: 26

## 2019-10-25 PROCEDURE — 73501 X-RAY EXAM HIP UNI 1 VIEW: CPT | Mod: 26,RT

## 2019-10-25 RX ORDER — PHYTONADIONE (VIT K1) 5 MG
5 TABLET ORAL DAILY
Refills: 0 | Status: COMPLETED | OUTPATIENT
Start: 2019-10-25 | End: 2019-10-28

## 2019-10-25 RX ORDER — FUROSEMIDE 40 MG
40 TABLET ORAL
Refills: 0 | Status: DISCONTINUED | OUTPATIENT
Start: 2019-10-25 | End: 2019-10-26

## 2019-10-25 RX ORDER — LIDOCAINE 4 G/100G
2 CREAM TOPICAL DAILY
Refills: 0 | Status: DISCONTINUED | OUTPATIENT
Start: 2019-10-25 | End: 2019-11-08

## 2019-10-25 RX ORDER — LACTULOSE 10 G/15ML
20 SOLUTION ORAL THREE TIMES A DAY
Refills: 0 | Status: DISCONTINUED | OUTPATIENT
Start: 2019-10-25 | End: 2019-10-27

## 2019-10-25 RX ADMIN — Medication 1 GRAM(S): at 05:29

## 2019-10-25 RX ADMIN — PANTOPRAZOLE SODIUM 40 MILLIGRAM(S): 20 TABLET, DELAYED RELEASE ORAL at 19:59

## 2019-10-25 RX ADMIN — LIDOCAINE 2 PATCH: 4 CREAM TOPICAL at 19:58

## 2019-10-25 RX ADMIN — Medication 1 GRAM(S): at 17:06

## 2019-10-25 RX ADMIN — Medication 1 GRAM(S): at 23:17

## 2019-10-25 RX ADMIN — Medication 50 MILLILITER(S): at 20:04

## 2019-10-25 RX ADMIN — CEFTRIAXONE 100 MILLIGRAM(S): 500 INJECTION, POWDER, FOR SOLUTION INTRAMUSCULAR; INTRAVENOUS at 13:50

## 2019-10-25 RX ADMIN — LACTULOSE 20 GRAM(S): 10 SOLUTION ORAL at 13:31

## 2019-10-25 RX ADMIN — LACTULOSE 20 GRAM(S): 10 SOLUTION ORAL at 22:20

## 2019-10-25 RX ADMIN — Medication 40 MILLIGRAM(S): at 20:00

## 2019-10-25 RX ADMIN — Medication 250 MILLIGRAM(S): at 05:37

## 2019-10-25 RX ADMIN — ATORVASTATIN CALCIUM 20 MILLIGRAM(S): 80 TABLET, FILM COATED ORAL at 22:19

## 2019-10-25 RX ADMIN — URSODIOL 300 MILLIGRAM(S): 250 TABLET, FILM COATED ORAL at 05:30

## 2019-10-25 RX ADMIN — PANTOPRAZOLE SODIUM 40 MILLIGRAM(S): 20 TABLET, DELAYED RELEASE ORAL at 05:37

## 2019-10-25 RX ADMIN — NYSTATIN CREAM 1 APPLICATION(S): 100000 CREAM TOPICAL at 05:29

## 2019-10-25 RX ADMIN — SODIUM CHLORIDE 80 MILLILITER(S): 9 INJECTION INTRAMUSCULAR; INTRAVENOUS; SUBCUTANEOUS at 05:31

## 2019-10-25 RX ADMIN — Medication 250 MILLIGRAM(S): at 17:06

## 2019-10-25 RX ADMIN — URSODIOL 300 MILLIGRAM(S): 250 TABLET, FILM COATED ORAL at 13:31

## 2019-10-25 RX ADMIN — LACTULOSE 10 GRAM(S): 10 SOLUTION ORAL at 05:30

## 2019-10-25 RX ADMIN — URSODIOL 300 MILLIGRAM(S): 250 TABLET, FILM COATED ORAL at 22:19

## 2019-10-25 RX ADMIN — IRON SUCROSE 110 MILLIGRAM(S): 20 INJECTION, SOLUTION INTRAVENOUS at 22:20

## 2019-10-25 RX ADMIN — Medication 50 MILLILITER(S): at 07:58

## 2019-10-25 RX ADMIN — Medication 5 MILLIGRAM(S): at 15:25

## 2019-10-25 RX ADMIN — NYSTATIN CREAM 1 APPLICATION(S): 100000 CREAM TOPICAL at 17:06

## 2019-10-25 RX ADMIN — TAMSULOSIN HYDROCHLORIDE 0.4 MILLIGRAM(S): 0.4 CAPSULE ORAL at 22:19

## 2019-10-25 NOTE — PROGRESS NOTE ADULT - ASSESSMENT
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 Patient came in with sudden onset chills. In the ER patient was febrile to 102.6F, hypoglycemic, in sepsis, admitted to MICU. Was started on Vancomycin and Zosyn. Blood cultures with group B strep.      Group B Strep septicemia   ascites/fluid overload  non-alcoholic cirrhosis    - WBC downtrending  - c/o right hip pain- X ray right hip unremarkable  - Blood cultures with group B strep  - Repeat blood cultures ordered/pending  - ? Source  - Check TTE   - CT A/P with cirrhosis. 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 Patient came in with sudden onset chills. In the ER patient was febrile to 102.6F, hypoglycemic, in sepsis, admitted to MICU. Was started on Vancomycin and Zosyn. Blood cultures with group B strep.      Group B Strep septicemia   ascites/fluid overload  non-alcoholic cirrhosis    - WBC downtrending  - c/o right hip pain- X ray right hip unremarkable  - Blood cultures with group B strep  - Repeat blood cultures ordered/pending  - ? Source  - Check TTE  - If above negative would complete 2 weeks of IV abx   - CT A/P with cirrhosis. Prior Hepatitis panel negative  - Continue Ceftriaxone 2 g IV q 24 hours  - Trend Fever  - Trend Leukocytosis      Will Follow

## 2019-10-25 NOTE — PROGRESS NOTE ADULT - SUBJECTIVE AND OBJECTIVE BOX
[facility]  INFECTIOUS DISEASES AND INTERNAL MEDICINE at [place]  =======================================================  Tavo Lyon MD  Diplomates American Board of Internal Medicine and Infectious Diseases  Tel: [phone]      Fax: [phone]  =======================================================      Follow up- Grp B strep bacteremia      Antibiotics:   cefTRIAXone   IVPB 2000 milliGRAM(s) IV Intermittent <User Schedule>       REVIEW OF SYSTEMS:  CONSTITUTIONAL:  No Fever. + chills  HEENT:  No diplopia or blurred vision.  No earache, sore throat or runny nose.  CARDIOVASCULAR:  No pressure, squeezing, strangling, tightness, heaviness or aching about the chest, neck, axilla or epigastrium.  RESPIRATORY:  No cough, shortness of breath  GASTROINTESTINAL:  No nausea, vomiting or diarrhea.  GENITOURINARY:  No dysuria, frequency or urgency.   MUSCULOSKELETAL:  no joint aches, no muscle pain  SKIN:  No change in skin, hair or nails.  NEUROLOGIC:  No Headaches, seizures   PSYCHIATRIC:  No disorder of thought or mood.  ENDOCRINE:  No heat or cold intolerance  HEMATOLOGICAL:  No easy bruising or bleeding.       Physical Exam:  Vital Signs Last 24 Hrs  T(C): 36.4 (24 Oct 2019 12:23), Max: 37 (23 Oct 2019 16:22)  T(F): 97.5 (24 Oct 2019 12:23), Max: 98.6 (23 Oct 2019 16:22)  HR: 87 (24 Oct 2019 12:00) (86 - 93)  BP: 104/56 (24 Oct 2019 12:00) (88/49 - 106/57)  BP(mean): 77 (24 Oct 2019 12:00) (64 - 78)  RR: 19 (24 Oct 2019 12:00) (11 - 26)  SpO2: 92% (24 Oct 2019 12:00) (92% - 96%)      GEN: NAD, pleasant  HEENT: normocephalic and atraumatic. EOMI. PERRL.  Anicteric  NECK: Supple.   LUNGS: Clear to auscultation.  HEART: Regular rate and rhythm   ABDOMEN: Soft, nontender, and nondistended.  Positive bowel sounds.    : No CVA tenderness  EXTREMITIES: Without any edema.  MSK: No joint swelling  NEUROLOGIC: No Focal Deficits  PSYCHIATRIC: Appropriate affect .  SKIN: No Rash      Labs:  10    132<L>  |  99  |  36.0<H>  ----------------------------<  116<H>  4.3   |  20.0<L>  |  1.69<H>    Ca    8.6      24 Oct 2019 05:25  Phos  4.8     10-24  Mg     1.6     10-24    TPro  5.0<L>  /  Alb  2.4<L>  /  TBili  6.1<H>  /  DBili  x   /  AST  194<H>  /  ALT  51<H>  /  AlkPhos  61  10-24                          6.6    18.99 )-----------( 84       ( 24 Oct 2019 05:25 )             20.2       PT/INR - ( 22 Oct 2019 17:37 )   PT: 22.2 sec;   INR: 1.89 ratio         PTT - ( 22 Oct 2019 17:37 )  PTT:25.0 sec  Urinalysis Basic - ( 22 Oct 2019 21:40 )    Color: Yellow / Appearance: Clear / S.020 / pH: x  Gluc: x / Ketone: Trace  / Bili: Small / Urobili: 1 mg/dL   Blood: x / Protein: 15 mg/dL / Nitrite: Negative   Leuk Esterase: Trace / RBC: 6-10 /HPF / WBC 0-2   Sq Epi: x / Non Sq Epi: Occasional / Bacteria: x      LIVER FUNCTIONS - ( 24 Oct 2019 05:25 )  Alb: 2.4 g/dL / Pro: 5.0 g/dL / ALK PHOS: 61 U/L / ALT: 51 U/L / AST: 194 U/L / GGT: x             ABG - ( 22 Oct 2019 17:48 )  pH, Arterial: 7.47  pH, Blood: x     /  pCO2: 33    /  pO2: 72    / HCO3: 25    / Base Excess: 0.2   /  SaO2: 96          RECENT CULTURES:  10-22 @ 21:39 .Urine     No growth      10-22 @ 19:18    RVP  NotDetec      10-22 @ 19:03 .Body Fluid     No growth at 1 day.  Culture in progress  Few White blood cells  No organisms seen      10-22 @ 17:55 .Blood Blood Culture PCR    Growth in aerobic and anaerobic bottles: Streptococcus agalactiae (Group  B) Susceptibility to follow.  Aerobic Bottle: 09:17 Hours to positivity  Anaerobic Bottle: 09:27 Hours to positivity      < from: CT Abdomen and Pelvis No Cont (10.23.19 @ 09:00) >  EXAM:  CT ABDOMEN AND PELVIS                          PROCEDURE DATE:  10/23/2019          INTERPRETATION:  CLINICAL INFORMATION: Sepsis of unknown source. Elevated   bilirubin.    COMPARISON: CT scan of the abdomen and pelvis from 10/8/2019    PROCEDURE:   CT of the Abdomen and Pelvis was performed without intravenous contrast.   Intravenous contrast: None. This limits evaluation of vascular structures.  Oral contrast: None.  Sagittal and coronal reformats were performed.    FINDINGS:    LOWER CHEST: Small bilateral pleural effusions which were have increased   and bibasilar atelectasis.    LIVER: Slightly nodular in contour with a prominent left lobe raising   concern for cirrhosis. Please correlate clinically.  BILE DUCTS: Normal caliber.  GALLBLADDER: Status post cholecystectomy  SPLEEN: Mildly prominent measuring up to 13.3 cm. This may reflect portal   hypertension. Please correlate clinically.  PANCREAS: Within normal limits.  ADRENALS: Within normal limits.  KIDNEYS/URETERS: 4 mm nonobstructing stone in the lower pole of the left   kidney.    BLADDER: Collapsed and contains a Beasley catheter.  REPRODUCTIVE ORGANS: Grossly unremarkable.    BOWEL: No bowel obstruction. Appendix unremarkable.  PERITONEUM: Mild to moderate ascites is unchanged.  VESSELS: Varices. Large varices are seen to the left of the abdominal   aorta  RETROPERITONEUM/LYMPH NODES: No lymphadenopathy.    ABDOMINAL WALL: Subcutaneous edema.  BONES: Degenerative changes of bone.    IMPRESSION:     Findings raising concern for cirrhosis. Mildly prominent spleen and   varices may reflect portal hypertension. Please correlate clinically..   Ascites which has not significantly changed. Small bilateral pleural   effusions has increased.    < end of copied text > [facility]  INFECTIOUS DISEASES AND INTERNAL MEDICINE at [place]  =======================================================  Tavo Lyon MD  Diplomates American Board of Internal Medicine and Infectious Diseases  Tel: [phone]      Fax: [phone]  =======================================================      Follow up- Grp B strep bacteremia   afebrile      Antibiotics:   cefTRIAXone   IVPB 2000 milliGRAM(s) IV Intermittent <User Schedule>       REVIEW OF SYSTEMS:  CONSTITUTIONAL:  No Fever. + chills  HEENT:  No diplopia or blurred vision.  No earache, sore throat or runny nose.  CARDIOVASCULAR:  No pressure, squeezing, strangling, tightness, heaviness or aching about the chest, neck, axilla or epigastrium.  RESPIRATORY:  No cough, shortness of breath  GASTROINTESTINAL:  No nausea, vomiting or diarrhea.  GENITOURINARY:  No dysuria, frequency or urgency.   MUSCULOSKELETAL:  no joint aches, no muscle pain  SKIN:  No change in skin, hair or nails.  NEUROLOGIC:  No Headaches, seizures   PSYCHIATRIC:  No disorder of thought or mood.  ENDOCRINE:  No heat or cold intolerance  HEMATOLOGICAL:  No easy bruising or bleeding.       Physical Exam:  Vital Signs Last 24 Hrs  T(C): 36.4 (24 Oct 2019 12:23), Max: 37 (23 Oct 2019 16:22)  T(F): 97.5 (24 Oct 2019 12:23), Max: 98.6 (23 Oct 2019 16:22)  HR: 87 (24 Oct 2019 12:00) (86 - 93)  BP: 104/56 (24 Oct 2019 12:00) (88/49 - 106/57)  BP(mean): 77 (24 Oct 2019 12:00) (64 - 78)  RR: 19 (24 Oct 2019 12:00) (11 - 26)  SpO2: 92% (24 Oct 2019 12:00) (92% - 96%)      GEN: NAD, pleasant  HEENT: normocephalic and atraumatic. EOMI. PERRL.  Anicteric  NECK: Supple.   LUNGS: Clear to auscultation.  HEART: Regular rate and rhythm   ABDOMEN: Soft, nontender, and nondistended.  Positive bowel sounds.    : No CVA tenderness  EXTREMITIES: Without any edema.  MSK: No joint swelling  NEUROLOGIC: No Focal Deficits  PSYCHIATRIC: Appropriate affect .  SKIN: No Rash      Labs:  10-24    132<L>  |  99  |  36.0<H>  ----------------------------<  116<H>  4.3   |  20.0<L>  |  1.69<H>    Ca    8.6      24 Oct 2019 05:25  Phos  4.8     10-24  Mg     1.6     10-24    TPro  5.0<L>  /  Alb  2.4<L>  /  TBili  6.1<H>  /  DBili  x   /  AST  194<H>  /  ALT  51<H>  /  AlkPhos  61  10-24                          6.6    18.99 )-----------( 84       ( 24 Oct 2019 05:25 )             20.2       PT/INR - ( 22 Oct 2019 17:37 )   PT: 22.2 sec;   INR: 1.89 ratio         PTT - ( 22 Oct 2019 17:37 )  PTT:25.0 sec  Urinalysis Basic - ( 22 Oct 2019 21:40 )    Color: Yellow / Appearance: Clear / S.020 / pH: x  Gluc: x / Ketone: Trace  / Bili: Small / Urobili: 1 mg/dL   Blood: x / Protein: 15 mg/dL / Nitrite: Negative   Leuk Esterase: Trace / RBC: 6-10 /HPF / WBC 0-2   Sq Epi: x / Non Sq Epi: Occasional / Bacteria: x      LIVER FUNCTIONS - ( 24 Oct 2019 05:25 )  Alb: 2.4 g/dL / Pro: 5.0 g/dL / ALK PHOS: 61 U/L / ALT: 51 U/L / AST: 194 U/L / GGT: x             ABG - ( 22 Oct 2019 17:48 )  pH, Arterial: 7.47  pH, Blood: x     /  pCO2: 33    /  pO2: 72    / HCO3: 25    / Base Excess: 0.2   /  SaO2: 96          RECENT CULTURES:  10-22 @ 21:39 .Urine     No growth      10-22 @ 19:18    RVP  NotDetec      10-22 @ 19:03 .Body Fluid     No growth at 1 day.  Culture in progress  Few White blood cells  No organisms seen      10-22 @ 17:55 .Blood Blood Culture PCR    Growth in aerobic and anaerobic bottles: Streptococcus agalactiae (Group  B) Susceptibility to follow.  Aerobic Bottle: 09:17 Hours to positivity  Anaerobic Bottle: 09:27 Hours to positivity      < from: CT Abdomen and Pelvis No Cont (10.23.19 @ 09:00) >  EXAM:  CT ABDOMEN AND PELVIS                          PROCEDURE DATE:  10/23/2019          INTERPRETATION:  CLINICAL INFORMATION: Sepsis of unknown source. Elevated   bilirubin.    COMPARISON: CT scan of the abdomen and pelvis from 10/8/2019    PROCEDURE:   CT of the Abdomen and Pelvis was performed without intravenous contrast.   Intravenous contrast: None. This limits evaluation of vascular structures.  Oral contrast: None.  Sagittal and coronal reformats were performed.    FINDINGS:    LOWER CHEST: Small bilateral pleural effusions which were have increased   and bibasilar atelectasis.    LIVER: Slightly nodular in contour with a prominent left lobe raising   concern for cirrhosis. Please correlate clinically.  BILE DUCTS: Normal caliber.  GALLBLADDER: Status post cholecystectomy  SPLEEN: Mildly prominent measuring up to 13.3 cm. This may reflect portal   hypertension. Please correlate clinically.  PANCREAS: Within normal limits.  ADRENALS: Within normal limits.  KIDNEYS/URETERS: 4 mm nonobstructing stone in the lower pole of the left   kidney.    BLADDER: Collapsed and contains a Beasley catheter.  REPRODUCTIVE ORGANS: Grossly unremarkable.    BOWEL: No bowel obstruction. Appendix unremarkable.  PERITONEUM: Mild to moderate ascites is unchanged.  VESSELS: Varices. Large varices are seen to the left of the abdominal   aorta  RETROPERITONEUM/LYMPH NODES: No lymphadenopathy.    ABDOMINAL WALL: Subcutaneous edema.  BONES: Degenerative changes of bone.    IMPRESSION:     Findings raising concern for cirrhosis. Mildly prominent spleen and   varices may reflect portal hypertension. Please correlate clinically..   Ascites which has not significantly changed. Small bilateral pleural   effusions has increased.    < end of copied text >

## 2019-10-25 NOTE — PROGRESS NOTE ADULT - SUBJECTIVE AND OBJECTIVE BOX
NEPHROLOGY INTERVAL HPI/OVERNIGHT EVENTS:    Examined   Lethargic- sleepy    MEDICATIONS  (STANDING):  albumin human 25% IVPB 100 milliLiter(s) IV Intermittent every 12 hours  atorvastatin 20 milliGRAM(s) Oral at bedtime  cefTRIAXone   IVPB 2000 milliGRAM(s) IV Intermittent <User Schedule>  cholecalciferol 1000 Unit(s) Oral daily  dextrose 5%. 1000 milliLiter(s) (50 mL/Hr) IV Continuous <Continuous>  dextrose 50% Injectable 12.5 Gram(s) IV Push once  dextrose 50% Injectable 25 Gram(s) IV Push once  dextrose 50% Injectable 25 Gram(s) IV Push once  influenza   Vaccine 0.5 milliLiter(s) IntraMuscular once  iron sucrose IVPB 200 milliGRAM(s) IV Intermittent every 24 hours  lactulose Syrup 20 Gram(s) Oral three times a day  nystatin Ointment 1 Application(s) Topical two times a day  pantoprazole  Injectable 40 milliGRAM(s) IV Push two times a day  phytonadione   Solution 5 milliGRAM(s) Oral daily  rifAXIMin 550 milliGRAM(s) Oral two times a day  saccharomyces boulardii 250 milliGRAM(s) Oral two times a day  sucralfate 1 Gram(s) Oral every 6 hours  tamsulosin 0.4 milliGRAM(s) Oral at bedtime  ursodiol Capsule 300 milliGRAM(s) Oral three times a day    MEDICATIONS  (PRN):  dextrose 40% Gel 15 Gram(s) Oral once PRN Blood Glucose LESS THAN 70 milliGRAM(s)/deciliter  glucagon  Injectable 1 milliGRAM(s) IntraMuscular once PRN Glucose LESS THAN 70 milligrams/deciliter  ondansetron Injectable 4 milliGRAM(s) IV Push every 6 hours PRN Nausea and/or Vomiting      Allergies    codeine (Anaphylaxis)    Intolerances        Vital Signs Last 24 Hrs  T(C): 36.6 (25 Oct 2019 16:45), Max: 36.7 (24 Oct 2019 17:15)  T(F): 97.9 (25 Oct 2019 16:45), Max: 98 (24 Oct 2019 17:15)  HR: 84 (25 Oct 2019 16:45) (84 - 91)  BP: 106/65 (25 Oct 2019 16:45) (99/60 - 115/69)  BP(mean): --  RR: 20 (25 Oct 2019 16:45) (18 - 20)  SpO2: 91% (25 Oct 2019 16:45) (86% - 91%)  Daily     Daily     PHYSICAL EXAM:  GENERAL: NAD, Obese   EYES:  conjunctiva and sclera clear  NECK: Supple, No JVD/Carotid Bruit -ve   NERVOUS SYSTEM:  A/O x3,   Lungs : No rales, No rhonchi,   HEART:  No murmur,  No rub, No gallops  ABDOMEN: Soft, NT/ND BS+ Ascitis ++  EXTREMITIES:   + pitting edema    LABS:                        7.9    16.17 )-----------( 82       ( 25 Oct 2019 08:00 )             23.7     10-25    131<L>  |  99  |  47.0<H>  ----------------------------<  113  3.9   |  24.0  |  1.82<H>    Ca    8.6      25 Oct 2019 08:00  Phos  3.6     10-  Mg     1.8     10-    TPro  5.1<L>  /  Alb  2.5<L>  /  TBili  6.4<H>  /  DBili  3.8<H>  /  AST  178<H>  /  ALT  58<H>  /  AlkPhos  76  10-25    PT/INR - ( 25 Oct 2019 08:00 )   PT: 24.4 sec;   INR: 2.07 ratio         PTT - ( 25 Oct 2019 08:00 )  PTT:44.9 sec  Urinalysis Basic - ( 24 Oct 2019 18:27 )    Color: Yellow / Appearance: Clear / S.025 / pH: x  Gluc: x / Ketone: Negative  / Bili: Negative / Urobili: Negative mg/dL   Blood: x / Protein: 30 mg/dL / Nitrite: Negative   Leuk Esterase: Negative / RBC: 6-10 /HPF / WBC 0-2   Sq Epi: x / Non Sq Epi: Occasional / Bacteria: Occasional      Magnesium, Serum: 1.8 mg/dL (10-25 @ 08:00)  Phosphorus Level, Serum: 3.6 mg/dL (10-25 @ 08:00)    ABG - ( 25 Oct 2019 13:46 )  pH, Arterial: 7.41  pH, Blood: x     /  pCO2: 42    /  pO2: 60    / HCO3: 26    / Base Excess: 1.8   /  SaO2: 91                    RADIOLOGY & ADDITIONAL TESTS:

## 2019-10-25 NOTE — PROGRESS NOTE ADULT - ASSESSMENT
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 Also gives vague h/o abd discomfort and poor appetite. Denies any N/V/D or chest pain. He was found to be hypoglycemic ~29 and had a TMax ~102.6. He was fluid resuscitated, given Abx Zosyn/ Vanco and also one amp D50%, repeat FS ~ 69 for which he received another amp D50. Lactate was 4.1 ICU consulted for fever, persistent hypoglycemia and hypotension. Patient takes Lantus 40U and Tradjenta 5mg at home which he was compliant with; but he has not been eating well. In the ED, he had a diagnostic paracentesis ~ 20cc bloody fluid was drained. Found to have gram positive bacteremia and sepsis.  Downgraded to the hospitalist service for further management.     1. Group B Septicemia   Source unclear  Continue Ceftriaxone  Repeat blood cultures from 10/24 pending   WBC downtrending  Procal > 11  TTE with preserved EF, mild to mod ascites, no vegetations   Lactatemia resolved with IVF resuscitation   CT a/p cirrhosis and ascites   ID consult appreciated    2. 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 1 more unit ordered for today. Will repeat labs in am  - started on IV iron per renal  - monitor H/H closely  - Seen by GI, EGD planned for today however postponed due elevated INR, lethargy and hypoxia. INR needs to be less than 2.  Will reschedule for monday 10/28    3. Acute Kidney Injury  -broad differential - renal hypoperfusion due to hypovolemia vs HRS vs ischemic ATN  -repeat UA, urine lytes, urine eosinophil pending   -hold lasix and aldactone  -judicious hydration; start IV albumin + NS after PRBC are completed and monitor for volume overload  -strict I/Os, daily weights, monitor renal function   -bladder scan > 600 cc of urine, refusing straight cath therefore re-insert bob and start flomax. Will do voiding trail when patient is more mobile  -avoid nephrotoxic agents and renally dose medications  -renal consult appreciated    3. Cirrhosis 2/2 to JEAN   - hold diuretics due to renal insufficiency   - Continue Rifaximin and lactulose   - seen by GI, likely secondary to NAFLD. Noted with lethargy today and Lactulose increased to 20 grams PO TID    4. Hypoglycemia with the underlying DM-2 with long time insulin use   - Lantus 40 units on hold due to episode of hypoglycemia. Current BG controlled   - monitor accuchecks ACHS  - will add back insulin when indicated     5. HLD  - Continue Atrovastatin 20mg     6. Thrombocytopenia  - likely due to sepsis and cirrhosis  - seen by hem/onc earlier this month at [facility]  - d/c heparin SC and monitor counts closely    7. Supratherapeutic INR  - EGD postponed, need INR < 2  - Vitamin K ordered x 3 doses  - repeat labs in am  - EGD rescheduled for mon    DVT PPE SCDs (thrombocytopenia)    Prognosis guarded. 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 Also gives vague h/o abd discomfort and poor appetite. Denies any N/V/D or chest pain. He was found to be hypoglycemic ~29 and had a TMax ~102.6. He was fluid resuscitated, given Abx Zosyn/ Vanco and also one amp D50%, repeat FS ~ 69 for which he received another amp D50. Lactate was 4.1 ICU consulted for fever, persistent hypoglycemia and hypotension. Patient takes Lantus 40U and Tradjenta 5mg at home which he was compliant with; but he has not been eating well. In the ED, he had a diagnostic paracentesis ~ 20cc bloody fluid was drained. Found to have gram positive bacteremia and sepsis.  Downgraded to the hospitalist service for further management.     1. Group B Septicemia   unclear source  Continue Ceftriaxone  Repeat blood cultures from 10/24 pending   WBC downtrending  Procal > 11  TTE with preserved EF, mild to mod ascites, no vegetations   Lactatemia resolved with IVF resuscitation   CT a/p cirrhosis and ascites; no intraabdominal source of infection  ID consult appreciated    2. 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 1 more unit ordered for today. Will repeat labs in am  - started on IV iron per renal  - monitor H/H closely  - Seen by GI, EGD planned for today however postponed due elevated INR, lethargy and hypoxia. INR needs to be less than 2.  Will reschedule for monday 10/28    3. Acute Kidney Injury  -broad differential - renal hypoperfusion due to hypovolemia vs HRS vs ischemic ATN  -repeat UA, urine lytes, urine eosinophil pending   -hold lasix and aldactone  -judicious hydration; start IV albumin + NS after PRBC are completed and monitor for volume overload  -strict I/Os, daily weights, monitor renal function   -bladder scan > 600 cc of urine, refusing straight cath therefore re-insert bob and start flomax. Will do voiding trail when patient is more mobile  -avoid nephrotoxic agents and renally dose medications  -renal consult appreciated    3. Cirrhosis 2/2 to JEAN   - hold diuretics due to renal insufficiency   - Continue Rifaximin and lactulose   - seen by GI, likely secondary to NAFLD. Noted with lethargy today and Lactulose increased to 20 grams PO TID    4. Hypoglycemia with the underlying DM-2 with long time insulin use   - Lantus 40 units on hold due to episode of hypoglycemia. Current BG controlled   - monitor accuchecks ACHS  - will add back insulin when indicated     5. HLD  - Continue Atrovastatin 20mg     6. Thrombocytopenia  - likely due to sepsis and cirrhosis  - seen by hem/onc earlier this month at [facility]  - d/c heparin SC and monitor counts closely    7. Supratherapeutic INR  - EGD postponed, need INR < 2  - Vitamin K ordered x 3 doses  - repeat labs in am  - EGD rescheduled for mon    DVT PPE SCDs (thrombocytopenia)    Prognosis guarded.

## 2019-10-25 NOTE — PROGRESS NOTE ADULT - SUBJECTIVE AND OBJECTIVE BOX
CHIEF COMPLAINT/INTERVAL HISTORY:    Patient is a 63y old  Male who presents with a chief complaint of Persistent hypoglycemia (24 Oct 2019 14:35)    SUBJECTIVE & OBJECTIVE:   Pt seen and examined at bedside  No overnight events  Has been NPO for EGD today however EGD postponed due elevated INR, lethargy and hypoxia. INR needs to be less than 2.  Will reschedule for monde 10/28  No specific complaints  VSS    Vital Signs Last 24 Hrs  T(C): 36.4 (25 Oct 2019 08:00), Max: 36.7 (24 Oct 2019 17:15)  T(F): 97.6 (25 Oct 2019 08:00), Max: 98 (24 Oct 2019 17:15)  HR: 84 (25 Oct 2019 08:00) (84 - 91)  BP: 99/60 (25 Oct 2019 08:00) (99/60 - 130/64)  BP(mean): --  RR: 18 (25 Oct 2019 08:00) (18 - 20)  SpO2: 90% (25 Oct 2019 08:02) (86% - 91%)    PHYSICAL EXAM:  GENERAL: elderly male, chronically ill appearing, laying in bed, NAD  HEAD:  Atraumatic, Normocephalic  EYES: EOMI, PERRLA, conjunctiva and sclera clear  ENMT: Moist mucous membranes  NECK: Supple   NERVOUS SYSTEM:  Alert & Oriented X3   CHEST/LUNG: coarse breath sounds  HEART: Regular rate and rhythm; + S1/S2, + Murmur  ABDOMEN: Soft, Nontender, nondistended; Bowel sounds present  EXTREMITIES:  ++ LE edema    LABS:                                   7.9    16.17 )-----------( 82       ( 25 Oct 2019 08:00 )             23.7     10-25    131<L>  |  99  |  47.0<H>  ----------------------------<  113  3.9   |  24.0  |  1.82<H>    Ca    8.6      25 Oct 2019 08:00  Phos  3.6     10-25  Mg     1.8     10-25    TPro  5.1<L>  /  Alb  2.5<L>  /  TBili  6.4<H>  /  DBili  3.8<H>  /  AST  178<H>  /  ALT  58<H>  /  AlkPhos  76  10-25    PT/INR - ( 22 Oct 2019 17:37 )   PT: 22.2 sec;   INR: 1.89 ratio      PTT - ( 22 Oct 2019 17:37 )  PTT:25.0 sec  Urinalysis Basic - ( 22 Oct 2019 21:40 )    Color: Yellow / Appearance: Clear / S.020 / pH: x  Gluc: x / Ketone: Trace  / Bili: Small / Urobili: 1 mg/dL   Blood: x / Protein: 15 mg/dL / Nitrite: Negative   Leuk Esterase: Trace / RBC: 6-10 /HPF / WBC 0-2   Sq Epi: x / Non Sq Epi: Occasional / Bacteria: x    CAPILLARY BLOOD GLUCOSE  POCT Blood Glucose.: 126 mg/dL (24 Oct 2019 08:32)  POCT Blood Glucose.: 137 mg/dL (23 Oct 2019 21:52)

## 2019-10-25 NOTE — PROGRESS NOTE ADULT - ASSESSMENT
MISA + Oliguria - suspect ATN 2/2 hypotension sepsis   pre renal or HRS ; May also have AIN -but less likely  Cont IV Albumin   Check 24 hr urine for protein and Cr CL  Very edematous will start lasix 40mg IV Q 12  - will need to tolerate a degree of azotemia  - will check renal sono r/o obstructive etiology    Anemia  GI following ; PRBC pending  cont Venofer ; Procrit if creat rises further    Hyponatremia/ cirrhosis - holding diuretics    High Procalcitonin, Lactate improving - on Abx    Will follow

## 2019-10-25 NOTE — PROGRESS NOTE ADULT - SUBJECTIVE AND OBJECTIVE BOX
Pt seen and examined. He was brought down for EGD and while here in Endoscopy Unit was noted to be lethargic but arousable and his INR was elevated at greater than 2.0 and his pulse ox on 2 liters oxygen was 93%. Pt seen by myself and Anesthesia and it was mutually decided upon to cancel his EGD today until he is more optimized. His INR needs to be less than 2 in the event that cautery or banding needs to be done.    REVIEW OF SYSTEMS:  Constitutional: No fever, weight loss or fatigue  Cardiovascular: No chest pain, palpitations, dizziness or leg swelling  Gastrointestinal: No abdominal or epigastric pain. No nausea, vomiting or hematemesis; No diarrhea or constipation. No melena or hematochezia.  Skin: No itching, burning, rashes or lesions       MEDICATIONS:  MEDICATIONS  (STANDING):  albumin human 25% IVPB 100 milliLiter(s) IV Intermittent every 12 hours  atorvastatin 20 milliGRAM(s) Oral at bedtime  cefTRIAXone   IVPB 2000 milliGRAM(s) IV Intermittent <User Schedule>  cholecalciferol 1000 Unit(s) Oral daily  dextrose 5%. 1000 milliLiter(s) (50 mL/Hr) IV Continuous <Continuous>  dextrose 50% Injectable 12.5 Gram(s) IV Push once  dextrose 50% Injectable 25 Gram(s) IV Push once  dextrose 50% Injectable 25 Gram(s) IV Push once  influenza   Vaccine 0.5 milliLiter(s) IntraMuscular once  iron sucrose IVPB 200 milliGRAM(s) IV Intermittent every 24 hours  lactulose Syrup 10 Gram(s) Oral every 8 hours  nystatin Ointment 1 Application(s) Topical two times a day  pantoprazole  Injectable 40 milliGRAM(s) IV Push two times a day  phytonadione   Solution 5 milliGRAM(s) Oral daily  rifAXIMin 550 milliGRAM(s) Oral two times a day  saccharomyces boulardii 250 milliGRAM(s) Oral two times a day  sodium chloride 0.9%. 1000 milliLiter(s) (80 mL/Hr) IV Continuous <Continuous>  sucralfate 1 Gram(s) Oral every 6 hours  tamsulosin 0.4 milliGRAM(s) Oral at bedtime  ursodiol Capsule 300 milliGRAM(s) Oral three times a day    MEDICATIONS  (PRN):  dextrose 40% Gel 15 Gram(s) Oral once PRN Blood Glucose LESS THAN 70 milliGRAM(s)/deciliter  glucagon  Injectable 1 milliGRAM(s) IntraMuscular once PRN Glucose LESS THAN 70 milligrams/deciliter  ondansetron Injectable 4 milliGRAM(s) IV Push every 6 hours PRN Nausea and/or Vomiting      Allergies    codeine (Anaphylaxis)    Intolerances        Vital Signs Last 24 Hrs  T(C): 36.4 (25 Oct 2019 08:00), Max: 36.7 (24 Oct 2019 17:15)  T(F): 97.6 (25 Oct 2019 08:00), Max: 98 (24 Oct 2019 17:15)  HR: 84 (25 Oct 2019 08:00) (84 - 91)  BP: 99/60 (25 Oct 2019 08:00) (99/60 - 130/64)  BP(mean): 75 (24 Oct 2019 14:00) (75 - 75)  RR: 18 (25 Oct 2019 08:00) (18 - 21)  SpO2: 90% (25 Oct 2019 08:02) (86% - 94%)    10-24 @ :  -  10-25 @ 07:00  --------------------------------------------------------  IN: 575 mL / OUT: 900 mL / NET: -325 mL    10-25 @ 07:  -  10-25 @ 12:53  --------------------------------------------------------  IN: 100 mL / OUT: 0 mL / NET: 100 mL        PHYSICAL EXAM:    General: Well developed; morbidly obese; in no acute distress  HEENT: MMM, conjunctiva pale and sclera anicteric.  Lungs: Decreased breath sounds bilaterally  Cor: RRR S1, S2 only.  Gastrointestinal: Abdomen: Soft , morbidly obese, non-tender non-distended; Normal bowel sounds; No palpable hepatosplenomegaly  Extremities: bilateral peripheral edema  Skin: Warm and dry. No obvious rash  Neuro: Pt. a + o x 3 but lethargic.    LABS:  CBC Full  -  ( 25 Oct 2019 08:00 )  WBC Count : 16.17 K/uL  RBC Count : 2.41 M/uL  Hemoglobin : 7.9 g/dL  Hematocrit : 23.7 %  Platelet Count - Automated : 82 K/uL  Mean Cell Volume : 98.3 fl  Mean Cell Hemoglobin : 32.8 pg  Mean Cell Hemoglobin Concentration : 33.3 gm/dL  Auto Neutrophil # : 13.49 K/uL  Auto Lymphocyte # : 1.10 K/uL  Auto Monocyte # : 1.16 K/uL  Auto Eosinophil # : 0.21 K/uL  Auto Basophil # : 0.04 K/uL  Auto Neutrophil % : 83.4 %  Auto Lymphocyte % : 6.8 %  Auto Monocyte % : 7.2 %  Auto Eosinophil % : 1.3 %  Auto Basophil % : 0.2 %    10-25    131<L>  |  99  |  47.0<H>  ----------------------------<  113  3.9   |  24.0  |  1.82<H>    Ca    8.6      25 Oct 2019 08:00  Phos  3.6     10-25  Mg     1.8     10-25    TPro  5.1<L>  /  Alb  2.5<L>  /  TBili  6.4<H>  /  DBili  3.8<H>  /  AST  178<H>  /  ALT  58<H>  /  AlkPhos  76  10-25    PT/INR - ( 25 Oct 2019 08:00 )   PT: 24.4 sec;   INR: 2.07 ratio         PTT - ( 25 Oct 2019 08:00 )  PTT:44.9 sec      Urinalysis Basic - ( 24 Oct 2019 18:27 )    Color: Yellow / Appearance: Clear / S.025 / pH: x  Gluc: x / Ketone: Negative  / Bili: Negative / Urobili: Negative mg/dL   Blood: x / Protein: 30 mg/dL / Nitrite: Negative   Leuk Esterase: Negative / RBC: 6-10 /HPF / WBC 0-2   Sq Epi: x / Non Sq Epi: Occasional / Bacteria: Occasional                RADIOLOGY & ADDITIONAL STUDIES (The following images were personally reviewed): Pt seen and examined. He was brought down for EGD and while here in Endoscopy Unit was noted to be lethargic but arousable and his INR was elevated at greater than 2.0 and his pulse ox on 2 liters oxygen was 93%. Pt seen by myself and Anesthesia and it was mutually decided upon to cancel his EGD today until he is more optimized. His INR needs to be less than 2 in the event that cautery or banding needs to be done.    REVIEW OF SYSTEMS:  Constitutional: No fever, weight loss or fatigue  Cardiovascular: No chest pain, palpitations, dizziness or leg swelling  Gastrointestinal: No abdominal or epigastric pain. No nausea, vomiting or hematemesis; No diarrhea or constipation. No melena or hematochezia.  Skin: No itching, burning, rashes or lesions       MEDICATIONS:  MEDICATIONS  (STANDING):  albumin human 25% IVPB 100 milliLiter(s) IV Intermittent every 12 hours  atorvastatin 20 milliGRAM(s) Oral at bedtime  cefTRIAXone   IVPB 2000 milliGRAM(s) IV Intermittent <User Schedule>  cholecalciferol 1000 Unit(s) Oral daily  dextrose 5%. 1000 milliLiter(s) (50 mL/Hr) IV Continuous <Continuous>  dextrose 50% Injectable 12.5 Gram(s) IV Push once  dextrose 50% Injectable 25 Gram(s) IV Push once  dextrose 50% Injectable 25 Gram(s) IV Push once  influenza   Vaccine 0.5 milliLiter(s) IntraMuscular once  iron sucrose IVPB 200 milliGRAM(s) IV Intermittent every 24 hours  lactulose Syrup 10 Gram(s) Oral every 8 hours  nystatin Ointment 1 Application(s) Topical two times a day  pantoprazole  Injectable 40 milliGRAM(s) IV Push two times a day  phytonadione   Solution 5 milliGRAM(s) Oral daily  rifAXIMin 550 milliGRAM(s) Oral two times a day  saccharomyces boulardii 250 milliGRAM(s) Oral two times a day  sodium chloride 0.9%. 1000 milliLiter(s) (80 mL/Hr) IV Continuous <Continuous>  sucralfate 1 Gram(s) Oral every 6 hours  tamsulosin 0.4 milliGRAM(s) Oral at bedtime  ursodiol Capsule 300 milliGRAM(s) Oral three times a day    MEDICATIONS  (PRN):  dextrose 40% Gel 15 Gram(s) Oral once PRN Blood Glucose LESS THAN 70 milliGRAM(s)/deciliter  glucagon  Injectable 1 milliGRAM(s) IntraMuscular once PRN Glucose LESS THAN 70 milligrams/deciliter  ondansetron Injectable 4 milliGRAM(s) IV Push every 6 hours PRN Nausea and/or Vomiting      Allergies    codeine (Anaphylaxis)    Intolerances        Vital Signs Last 24 Hrs  T(C): 36.4 (25 Oct 2019 08:00), Max: 36.7 (24 Oct 2019 17:15)  T(F): 97.6 (25 Oct 2019 08:00), Max: 98 (24 Oct 2019 17:15)  HR: 84 (25 Oct 2019 08:00) (84 - 91)  BP: 99/60 (25 Oct 2019 08:00) (99/60 - 130/64)  BP(mean): 75 (24 Oct 2019 14:00) (75 - 75)  RR: 18 (25 Oct 2019 08:00) (18 - 21)  SpO2: 90% (25 Oct 2019 08:02) (86% - 94%)    10-24 @ :  -  10-25 @ 07:00  --------------------------------------------------------  IN: 575 mL / OUT: 900 mL / NET: -325 mL    10-25 @ 07:  -  10-25 @ 12:53  --------------------------------------------------------  IN: 100 mL / OUT: 0 mL / NET: 100 mL        PHYSICAL EXAM:    General: Well developed; morbidly obese; in no acute distress  HEENT: MMM, conjunctiva pale and sclera anicteric.  Lungs: Decreased breath sounds bilaterally  Cor: RRR S1, S2 only.  Gastrointestinal: Abdomen: Soft , morbidly obese, non-tender non-distended; Normal bowel sounds; No palpable hepatosplenomegaly  Extremities: bilateral peripheral edema  Skin: Warm and dry. No obvious rash  Neuro: Pt. a + o x 3 but lethargic possibly encephalopathic.    LABS:  CBC Full  -  ( 25 Oct 2019 08:00 )  WBC Count : 16.17 K/uL  RBC Count : 2.41 M/uL  Hemoglobin : 7.9 g/dL  Hematocrit : 23.7 %  Platelet Count - Automated : 82 K/uL  Mean Cell Volume : 98.3 fl  Mean Cell Hemoglobin : 32.8 pg  Mean Cell Hemoglobin Concentration : 33.3 gm/dL  Auto Neutrophil # : 13.49 K/uL  Auto Lymphocyte # : 1.10 K/uL  Auto Monocyte # : 1.16 K/uL  Auto Eosinophil # : 0.21 K/uL  Auto Basophil # : 0.04 K/uL  Auto Neutrophil % : 83.4 %  Auto Lymphocyte % : 6.8 %  Auto Monocyte % : 7.2 %  Auto Eosinophil % : 1.3 %  Auto Basophil % : 0.2 %    10-25    131<L>  |  99  |  47.0<H>  ----------------------------<  113  3.9   |  24.0  |  1.82<H>    Ca    8.6      25 Oct 2019 08:00  Phos  3.6     10-25  Mg     1.8     10-25    TPro  5.1<L>  /  Alb  2.5<L>  /  TBili  6.4<H>  /  DBili  3.8<H>  /  AST  178<H>  /  ALT  58<H>  /  AlkPhos  76  10-25    PT/INR - ( 25 Oct 2019 08:00 )   PT: 24.4 sec;   INR: 2.07 ratio         PTT - ( 25 Oct 2019 08:00 )  PTT:44.9 sec      Urinalysis Basic - ( 24 Oct 2019 18:27 )    Color: Yellow / Appearance: Clear / S.025 / pH: x  Gluc: x / Ketone: Negative  / Bili: Negative / Urobili: Negative mg/dL   Blood: x / Protein: 30 mg/dL / Nitrite: Negative   Leuk Esterase: Negative / RBC: 6-10 /HPF / WBC 0-2   Sq Epi: x / Non Sq Epi: Occasional / Bacteria: Occasional                RADIOLOGY & ADDITIONAL STUDIES (The following images were personally reviewed):

## 2019-10-26 LAB
ALBUMIN SERPL ELPH-MCNC: 2.8 G/DL — LOW (ref 3.3–5.2)
ALP SERPL-CCNC: 82 U/L — SIGNIFICANT CHANGE UP (ref 40–120)
ALT FLD-CCNC: 54 U/L — HIGH
ANION GAP SERPL CALC-SCNC: 12 MMOL/L — SIGNIFICANT CHANGE UP (ref 5–17)
AST SERPL-CCNC: 142 U/L — HIGH
BASOPHILS # BLD AUTO: 0.04 K/UL — SIGNIFICANT CHANGE UP (ref 0–0.2)
BASOPHILS NFR BLD AUTO: 0.3 % — SIGNIFICANT CHANGE UP (ref 0–2)
BILIRUB SERPL-MCNC: 6.1 MG/DL — HIGH (ref 0.4–2)
BUN SERPL-MCNC: 51 MG/DL — HIGH (ref 8–20)
CALCIUM SERPL-MCNC: 8.7 MG/DL — SIGNIFICANT CHANGE UP (ref 8.6–10.2)
CHLORIDE SERPL-SCNC: 100 MMOL/L — SIGNIFICANT CHANGE UP (ref 98–107)
CO2 SERPL-SCNC: 22 MMOL/L — SIGNIFICANT CHANGE UP (ref 22–29)
CREAT SERPL-MCNC: 1.58 MG/DL — HIGH (ref 0.5–1.3)
EOSINOPHIL # BLD AUTO: 0.25 K/UL — SIGNIFICANT CHANGE UP (ref 0–0.5)
EOSINOPHIL NFR BLD AUTO: 2.1 % — SIGNIFICANT CHANGE UP (ref 0–6)
GLUCOSE BLDC GLUCOMTR-MCNC: 126 MG/DL — HIGH (ref 70–99)
GLUCOSE BLDC GLUCOMTR-MCNC: 135 MG/DL — HIGH (ref 70–99)
GLUCOSE BLDC GLUCOMTR-MCNC: 139 MG/DL — HIGH (ref 70–99)
GLUCOSE BLDC GLUCOMTR-MCNC: 149 MG/DL — HIGH (ref 70–99)
GLUCOSE SERPL-MCNC: 119 MG/DL — HIGH (ref 70–115)
HCT VFR BLD CALC: 24.4 % — LOW (ref 39–50)
HGB BLD-MCNC: 8 G/DL — LOW (ref 13–17)
IMM GRANULOCYTES NFR BLD AUTO: 1.9 % — HIGH (ref 0–1.5)
INR BLD: 1.99 RATIO — HIGH (ref 0.88–1.16)
LYMPHOCYTES # BLD AUTO: 1.09 K/UL — SIGNIFICANT CHANGE UP (ref 1–3.3)
LYMPHOCYTES # BLD AUTO: 9.1 % — LOW (ref 13–44)
MAGNESIUM SERPL-MCNC: 1.9 MG/DL — SIGNIFICANT CHANGE UP (ref 1.6–2.6)
MCHC RBC-ENTMCNC: 32.4 PG — SIGNIFICANT CHANGE UP (ref 27–34)
MCHC RBC-ENTMCNC: 32.8 GM/DL — SIGNIFICANT CHANGE UP (ref 32–36)
MCV RBC AUTO: 98.8 FL — SIGNIFICANT CHANGE UP (ref 80–100)
MONOCYTES # BLD AUTO: 1.09 K/UL — HIGH (ref 0–0.9)
MONOCYTES NFR BLD AUTO: 9.1 % — SIGNIFICANT CHANGE UP (ref 2–14)
NEUTROPHILS # BLD AUTO: 9.28 K/UL — HIGH (ref 1.8–7.4)
NEUTROPHILS NFR BLD AUTO: 77.5 % — HIGH (ref 43–77)
PHOSPHATE SERPL-MCNC: 2.9 MG/DL — SIGNIFICANT CHANGE UP (ref 2.4–4.7)
PLATELET # BLD AUTO: 74 K/UL — LOW (ref 150–400)
POTASSIUM SERPL-MCNC: 4.2 MMOL/L — SIGNIFICANT CHANGE UP (ref 3.5–5.3)
POTASSIUM SERPL-SCNC: 4.2 MMOL/L — SIGNIFICANT CHANGE UP (ref 3.5–5.3)
PROT SERPL-MCNC: 5.2 G/DL — LOW (ref 6.6–8.7)
PROTHROM AB SERPL-ACNC: 23.4 SEC — HIGH (ref 10–12.9)
RBC # BLD: 2.47 M/UL — LOW (ref 4.2–5.8)
RBC # FLD: 17.6 % — HIGH (ref 10.3–14.5)
SODIUM SERPL-SCNC: 134 MMOL/L — LOW (ref 135–145)
WBC # BLD: 11.98 K/UL — HIGH (ref 3.8–10.5)
WBC # FLD AUTO: 11.98 K/UL — HIGH (ref 3.8–10.5)

## 2019-10-26 PROCEDURE — 99233 SBSQ HOSP IP/OBS HIGH 50: CPT

## 2019-10-26 PROCEDURE — 76775 US EXAM ABDO BACK WALL LIM: CPT | Mod: 26

## 2019-10-26 PROCEDURE — 71250 CT THORAX DX C-: CPT | Mod: 26

## 2019-10-26 RX ORDER — POTASSIUM CHLORIDE 20 MEQ
40 PACKET (EA) ORAL DAILY
Refills: 0 | Status: DISCONTINUED | OUTPATIENT
Start: 2019-10-26 | End: 2019-11-08

## 2019-10-26 RX ORDER — FUROSEMIDE 40 MG
40 TABLET ORAL THREE TIMES A DAY
Refills: 0 | Status: DISCONTINUED | OUTPATIENT
Start: 2019-10-26 | End: 2019-10-30

## 2019-10-26 RX ADMIN — TAMSULOSIN HYDROCHLORIDE 0.4 MILLIGRAM(S): 0.4 CAPSULE ORAL at 21:17

## 2019-10-26 RX ADMIN — LIDOCAINE 2 PATCH: 4 CREAM TOPICAL at 22:00

## 2019-10-26 RX ADMIN — Medication 1 GRAM(S): at 17:17

## 2019-10-26 RX ADMIN — NYSTATIN CREAM 1 APPLICATION(S): 100000 CREAM TOPICAL at 17:18

## 2019-10-26 RX ADMIN — URSODIOL 300 MILLIGRAM(S): 250 TABLET, FILM COATED ORAL at 10:42

## 2019-10-26 RX ADMIN — NYSTATIN CREAM 1 APPLICATION(S): 100000 CREAM TOPICAL at 05:55

## 2019-10-26 RX ADMIN — Medication 250 MILLIGRAM(S): at 05:55

## 2019-10-26 RX ADMIN — Medication 50 MILLILITER(S): at 11:20

## 2019-10-26 RX ADMIN — IRON SUCROSE 110 MILLIGRAM(S): 20 INJECTION, SOLUTION INTRAVENOUS at 23:56

## 2019-10-26 RX ADMIN — Medication 1000 UNIT(S): at 10:42

## 2019-10-26 RX ADMIN — Medication 40 MILLIGRAM(S): at 11:20

## 2019-10-26 RX ADMIN — URSODIOL 300 MILLIGRAM(S): 250 TABLET, FILM COATED ORAL at 21:17

## 2019-10-26 RX ADMIN — PANTOPRAZOLE SODIUM 40 MILLIGRAM(S): 20 TABLET, DELAYED RELEASE ORAL at 05:55

## 2019-10-26 RX ADMIN — LIDOCAINE 2 PATCH: 4 CREAM TOPICAL at 19:00

## 2019-10-26 RX ADMIN — LACTULOSE 20 GRAM(S): 10 SOLUTION ORAL at 10:42

## 2019-10-26 RX ADMIN — Medication 1 GRAM(S): at 23:57

## 2019-10-26 RX ADMIN — ATORVASTATIN CALCIUM 20 MILLIGRAM(S): 80 TABLET, FILM COATED ORAL at 21:17

## 2019-10-26 RX ADMIN — PANTOPRAZOLE SODIUM 40 MILLIGRAM(S): 20 TABLET, DELAYED RELEASE ORAL at 17:18

## 2019-10-26 RX ADMIN — Medication 1 GRAM(S): at 10:42

## 2019-10-26 RX ADMIN — LIDOCAINE 2 PATCH: 4 CREAM TOPICAL at 10:43

## 2019-10-26 RX ADMIN — Medication 250 MILLIGRAM(S): at 17:17

## 2019-10-26 RX ADMIN — Medication 40 MILLIGRAM(S): at 21:17

## 2019-10-26 RX ADMIN — Medication 40 MILLIEQUIVALENT(S): at 11:20

## 2019-10-26 RX ADMIN — Medication 1 GRAM(S): at 05:55

## 2019-10-26 RX ADMIN — LACTULOSE 20 GRAM(S): 10 SOLUTION ORAL at 21:17

## 2019-10-26 RX ADMIN — LIDOCAINE 2 PATCH: 4 CREAM TOPICAL at 10:36

## 2019-10-26 RX ADMIN — LIDOCAINE 2 PATCH: 4 CREAM TOPICAL at 08:00

## 2019-10-26 RX ADMIN — LACTULOSE 20 GRAM(S): 10 SOLUTION ORAL at 05:55

## 2019-10-26 RX ADMIN — URSODIOL 300 MILLIGRAM(S): 250 TABLET, FILM COATED ORAL at 05:55

## 2019-10-26 RX ADMIN — Medication 5 MILLIGRAM(S): at 10:42

## 2019-10-26 RX ADMIN — CEFTRIAXONE 100 MILLIGRAM(S): 500 INJECTION, POWDER, FOR SOLUTION INTRAMUSCULAR; INTRAVENOUS at 10:42

## 2019-10-26 NOTE — PROGRESS NOTE ADULT - ASSESSMENT
MISA + Oliguria - suspect ATN 2/2 hypotension sepsis   pre renal or HRS ; May also have AIN -but less likely  Cont IV Albumin   Check 24 hr urine for protein and Cr CL- ongoing  Very edematous will escalate lasix 40mg IV Q 12 to Q8hrs  - will need to tolerate a degree of azotemia  - will check renal sono r/o obstructive etiology    Anemia  GI following   cont Venofer    Hyponatremia/ cirrhosis - clinically volume overloaded    High Procalcitonin, Lactate improving - on Abx    Will follow

## 2019-10-26 NOTE — PROGRESS NOTE ADULT - ASSESSMENT
The patient is a 63 year old male with a past medical history of  DM, HLD, non-alcoholic cirrhosis w/ hepatic encephalopathy (on lactulose), ascites/fluid overload, GIB, chronic thrombocytopenia presented to the ED complaining of chills. He was found to be hypoglycemic ~29 and had a TMax ~102.6. He was fluid resuscitated, given Abx Zosyn/ Vanco and also one amp D50%, repeat FS ~ 69 for which he received another amp D50. Lactate was 4.1 ICU consulted for fever, persistent hypoglycemia and hypotension. Patient takes Lantus 40U and Tradjenta 5mg at home which he was compliant with; but he has not been eating well. In the ED, he had a diagnostic paracentesis ~ 20cc bloody fluid was drained. Fluid cultures were negative. Found to have gram positive bacteremia and sepsis.  Downgraded to the hospitalist service for further management. CT abdomen and pelvis consistent with hepatomegaly and cirrhosis. GI was consulted; Hepatitis panel negative.     Assessment/Plan:    1. Group B bacteremia: Source unclear  Repeat blood cultures on 10/23 were negative  IV rocephin x 2 weeks    2. Cirrhosis likely secondary to NAFLD: IV lasix for diuresis  ON lactulose TID  Elevated INR; PO vitamin K ordered    3. MIAS secondary to volume overload  Renal ultrasound was normal    4. Anemia: suspected secondary to GI bleed  EGD was cancelled yesterday for elevated INR; Tentatively rescheduled for Monday pending INR < 2  Monitor hb/hct     5. Acute urinary retention: Beasley in place   Flomax day 3    6. . Hypoglycemia with the underlying DM-2 with long time insulin use   - Lantus 40 units on hold due to episode of hypoglycemia. Current BG controlled   - monitor accuchecks ACHS  - Check Hba1c    7. Thrombocytopenia likely secondary to cirrhosis       DVT PPE SCDs- thrombocytopenia and bleeding risk     Prognosis guarded.

## 2019-10-26 NOTE — PROGRESS NOTE ADULT - SUBJECTIVE AND OBJECTIVE BOX
CC: Abdominal distension    INTERVAL HPI/OVERNIGHT EVENTS: Patient seen and examined, complaints of abdominal distension and bloating.       Vital Signs Last 24 Hrs  T(C): 36.4 (26 Oct 2019 08:41), Max: 37.1 (25 Oct 2019 17:17)  T(F): 97.6 (26 Oct 2019 08:41), Max: 98.7 (25 Oct 2019 17:17)  HR: 78 (26 Oct 2019 11:17) (78 - 86)  BP: 122/60 (26 Oct 2019 11:17) (99/61 - 122/60)  BP(mean): --  RR: 20 (26 Oct 2019 08:41) (20 - 20)  SpO2: 91% (26 Oct 2019 08:41) (91% - 92%)    PHYSICAL EXAM:    GENERAL: NAD, AOX3  HEAD:  Atraumatic, Normocephalic  EYES: EOMI, PERRLA, conjunctiva and sclera clear  ENMT: Moist mucous membranes  CHEST/LUNG: Clear to auscultation bilaterally; No rales, rhonchi, wheezing, or rubs  HEART: Regular rate and rhythm; No murmurs, rubs, or gallops  ABDOMEN: Soft, Nontender,+ Distension; Bowel sounds present  EXTREMITIES:  2+ Peripheral Pulses, No clubbing, cyanosis  2+ pedal edema bilaterally         MEDICATIONS  (STANDING):  atorvastatin 20 milliGRAM(s) Oral at bedtime  cefTRIAXone   IVPB 2000 milliGRAM(s) IV Intermittent <User Schedule>  cholecalciferol 1000 Unit(s) Oral daily  dextrose 5%. 1000 milliLiter(s) (50 mL/Hr) IV Continuous <Continuous>  dextrose 50% Injectable 12.5 Gram(s) IV Push once  dextrose 50% Injectable 25 Gram(s) IV Push once  dextrose 50% Injectable 25 Gram(s) IV Push once  furosemide   Injectable 40 milliGRAM(s) IV Push three times a day  influenza   Vaccine 0.5 milliLiter(s) IntraMuscular once  iron sucrose IVPB 200 milliGRAM(s) IV Intermittent every 24 hours  lactulose Syrup 20 Gram(s) Oral three times a day  lidocaine   Patch 2 Patch Transdermal daily  nystatin Ointment 1 Application(s) Topical two times a day  pantoprazole  Injectable 40 milliGRAM(s) IV Push two times a day  phytonadione   Solution 5 milliGRAM(s) Oral daily  potassium chloride    Tablet ER 40 milliEquivalent(s) Oral daily  rifAXIMin 550 milliGRAM(s) Oral two times a day  saccharomyces boulardii 250 milliGRAM(s) Oral two times a day  sucralfate 1 Gram(s) Oral every 6 hours  tamsulosin 0.4 milliGRAM(s) Oral at bedtime  ursodiol Capsule 300 milliGRAM(s) Oral three times a day    MEDICATIONS  (PRN):  dextrose 40% Gel 15 Gram(s) Oral once PRN Blood Glucose LESS THAN 70 milliGRAM(s)/deciliter  glucagon  Injectable 1 milliGRAM(s) IntraMuscular once PRN Glucose LESS THAN 70 milligrams/deciliter  ondansetron Injectable 4 milliGRAM(s) IV Push every 6 hours PRN Nausea and/or Vomiting      Allergies    codeine (Anaphylaxis)    Intolerances          LABS:                          8.0    11.98 )-----------( 74       ( 26 Oct 2019 06:16 )             24.4     10-    134<L>  |  100  |  51.0<H>  ----------------------------<  119<H>  4.2   |  22.0  |  1.58<H>    Ca    8.7      26 Oct 2019 06:16  Phos  2.9     10-  Mg     1.9     10-26    TPro  5.2<L>  /  Alb  2.8<L>  /  TBili  6.1<H>  /  DBili  x   /  AST  142<H>  /  ALT  54<H>  /  AlkPhos  82  10-    PT/INR - ( 26 Oct 2019 06:16 )   PT: 23.4 sec;   INR: 1.99 ratio         PTT - ( 25 Oct 2019 08:00 )  PTT:44.9 sec  Urinalysis Basic - ( 24 Oct 2019 18:27 )    Color: Yellow / Appearance: Clear / S.025 / pH: x  Gluc: x / Ketone: Negative  / Bili: Negative / Urobili: Negative mg/dL   Blood: x / Protein: 30 mg/dL / Nitrite: Negative   Leuk Esterase: Negative / RBC: 6-10 /HPF / WBC 0-2   Sq Epi: x / Non Sq Epi: Occasional / Bacteria: Occasional        RADIOLOGY & ADDITIONAL TESTS:

## 2019-10-26 NOTE — PROGRESS NOTE ADULT - SUBJECTIVE AND OBJECTIVE BOX
Pt seen and examined. Appears more alert this AM. Yesterday's EGD was cancelled because of lethargy secondary to HE and INR greater than 2 both secondary to decompensated cirrhosis secondary to NAFLD. Vitamin K and increased Lactulose ordered yesterday.     REVIEW OF SYSTEMS:  Constitutional: No fever, weight loss or fatigue  Cardiovascular: No chest pain, palpitations, dizziness or leg swelling  Gastrointestinal: As noted above.  Skin: No itching, burning, rashes or lesions       MEDICATIONS:  MEDICATIONS  (STANDING):  albumin human 25% IVPB 100 milliLiter(s) IV Intermittent every 12 hours  atorvastatin 20 milliGRAM(s) Oral at bedtime  cefTRIAXone   IVPB 2000 milliGRAM(s) IV Intermittent <User Schedule>  cholecalciferol 1000 Unit(s) Oral daily  dextrose 5%. 1000 milliLiter(s) (50 mL/Hr) IV Continuous <Continuous>  dextrose 50% Injectable 12.5 Gram(s) IV Push once  dextrose 50% Injectable 25 Gram(s) IV Push once  dextrose 50% Injectable 25 Gram(s) IV Push once  furosemide   Injectable 40 milliGRAM(s) IV Push three times a day  influenza   Vaccine 0.5 milliLiter(s) IntraMuscular once  iron sucrose IVPB 200 milliGRAM(s) IV Intermittent every 24 hours  lactulose Syrup 20 Gram(s) Oral three times a day  lidocaine   Patch 2 Patch Transdermal daily  nystatin Ointment 1 Application(s) Topical two times a day  pantoprazole  Injectable 40 milliGRAM(s) IV Push two times a day  phytonadione   Solution 5 milliGRAM(s) Oral daily  potassium chloride    Tablet ER 40 milliEquivalent(s) Oral daily  rifAXIMin 550 milliGRAM(s) Oral two times a day  saccharomyces boulardii 250 milliGRAM(s) Oral two times a day  sucralfate 1 Gram(s) Oral every 6 hours  tamsulosin 0.4 milliGRAM(s) Oral at bedtime  ursodiol Capsule 300 milliGRAM(s) Oral three times a day    MEDICATIONS  (PRN):  dextrose 40% Gel 15 Gram(s) Oral once PRN Blood Glucose LESS THAN 70 milliGRAM(s)/deciliter  glucagon  Injectable 1 milliGRAM(s) IntraMuscular once PRN Glucose LESS THAN 70 milligrams/deciliter  ondansetron Injectable 4 milliGRAM(s) IV Push every 6 hours PRN Nausea and/or Vomiting      Allergies    codeine (Anaphylaxis)    Intolerances        Vital Signs Last 24 Hrs  T(C): 36.4 (26 Oct 2019 08:41), Max: 37.1 (25 Oct 2019 17:17)  T(F): 97.6 (26 Oct 2019 08:41), Max: 98.7 (25 Oct 2019 17:17)  HR: 84 (26 Oct 2019 08:41) (81 - 86)  BP: 122/55 (26 Oct 2019 08:41) (99/61 - 122/55)  BP(mean): --  RR: 20 (26 Oct 2019 08:41) (20 - 20)  SpO2: 91% (26 Oct 2019 08:41) (91% - 92%)    10-25 @ 07:01  -  10-26 @ 07:00  --------------------------------------------------------  IN: 871 mL / OUT: 1275 mL / NET: -404 mL        PHYSICAL EXAM:    General: Well developed; morbidly obese; in no acute distress  HEENT: MMM, conjunctiva pink and sclera anicteric.  Lungs: clear to auscultation bilaterally.  Cor: RRR S1, S2 only.  Gastrointestinal: Abdomen: Soft obese non-distended; Normal bowel sounds; No palpable  hepatosplenomegaly  Skin: Warm and dry. No obvious rash  Neuro: Pt. a + o x 3, no tremulousness or asterixsis, not lethargic this AM.    LABS:  ABG - ( 25 Oct 2019 13:46 )  pH, Arterial: 7.41  pH, Blood: x     /  pCO2: 42    /  pO2: 60    / HCO3: 26    / Base Excess: 1.8   /  SaO2: 91        CBC Full  -  ( 26 Oct 2019 06:16 )  WBC Count : 11.98 K/uL  RBC Count : 2.47 M/uL  Hemoglobin : 8.0 g/dL  Hematocrit : 24.4 %  Platelet Count - Automated : 74 K/uL  Mean Cell Volume : 98.8 fl  Mean Cell Hemoglobin : 32.4 pg  Mean Cell Hemoglobin Concentration : 32.8 gm/dL  Auto Neutrophil # : 9.28 K/uL  Auto Lymphocyte # : 1.09 K/uL  Auto Monocyte # : 1.09 K/uL  Auto Eosinophil # : 0.25 K/uL  Auto Basophil # : 0.04 K/uL  Auto Neutrophil % : 77.5 %  Auto Lymphocyte % : 9.1 %  Auto Monocyte % : 9.1 %  Auto Eosinophil % : 2.1 %  Auto Basophil % : 0.3 %    10-26    134<L>  |  100  |  51.0<H>  ----------------------------<  119<H>  4.2   |  22.0  |  1.58<H>    Ca    8.7      26 Oct 2019 06:16  Phos  2.9     10-26  Mg     1.9     10-26    TPro  5.2<L>  /  Alb  2.8<L>  /  TBili  6.1<H>  /  DBili  x   /  AST  142<H>  /  ALT  54<H>  /  AlkPhos  82  10-26    PT/INR - ( 26 Oct 2019 06:16 )   PT: 23.4 sec;   INR: 1.99 ratio         PTT - ( 25 Oct 2019 08:00 )  PTT:44.9 sec      Urinalysis Basic - ( 24 Oct 2019 18:27 )    Color: Yellow / Appearance: Clear / S.025 / pH: x  Gluc: x / Ketone: Negative  / Bili: Negative / Urobili: Negative mg/dL   Blood: x / Protein: 30 mg/dL / Nitrite: Negative   Leuk Esterase: Negative / RBC: 6-10 /HPF / WBC 0-2   Sq Epi: x / Non Sq Epi: Occasional / Bacteria: Occasional                RADIOLOGY & ADDITIONAL STUDIES (The following images were personally reviewed):

## 2019-10-26 NOTE — PROGRESS NOTE ADULT - SUBJECTIVE AND OBJECTIVE BOX
NEPHROLOGY INTERVAL HPI/OVERNIGHT EVENTS:    Examined  Edematous    MEDICATIONS  (STANDING):  albumin human 25% IVPB 100 milliLiter(s) IV Intermittent every 12 hours  atorvastatin 20 milliGRAM(s) Oral at bedtime  cefTRIAXone   IVPB 2000 milliGRAM(s) IV Intermittent <User Schedule>  cholecalciferol 1000 Unit(s) Oral daily  dextrose 5%. 1000 milliLiter(s) (50 mL/Hr) IV Continuous <Continuous>  dextrose 50% Injectable 12.5 Gram(s) IV Push once  dextrose 50% Injectable 25 Gram(s) IV Push once  dextrose 50% Injectable 25 Gram(s) IV Push once  furosemide   Injectable 40 milliGRAM(s) IV Push two times a day  influenza   Vaccine 0.5 milliLiter(s) IntraMuscular once  iron sucrose IVPB 200 milliGRAM(s) IV Intermittent every 24 hours  lactulose Syrup 20 Gram(s) Oral three times a day  lidocaine   Patch 2 Patch Transdermal daily  nystatin Ointment 1 Application(s) Topical two times a day  pantoprazole  Injectable 40 milliGRAM(s) IV Push two times a day  phytonadione   Solution 5 milliGRAM(s) Oral daily  rifAXIMin 550 milliGRAM(s) Oral two times a day  saccharomyces boulardii 250 milliGRAM(s) Oral two times a day  sucralfate 1 Gram(s) Oral every 6 hours  tamsulosin 0.4 milliGRAM(s) Oral at bedtime  ursodiol Capsule 300 milliGRAM(s) Oral three times a day    MEDICATIONS  (PRN):  dextrose 40% Gel 15 Gram(s) Oral once PRN Blood Glucose LESS THAN 70 milliGRAM(s)/deciliter  glucagon  Injectable 1 milliGRAM(s) IntraMuscular once PRN Glucose LESS THAN 70 milligrams/deciliter  ondansetron Injectable 4 milliGRAM(s) IV Push every 6 hours PRN Nausea and/or Vomiting      Allergies    codeine (Anaphylaxis)    Intolerances        Vital Signs Last 24 Hrs  T(C): 36.7 (25 Oct 2019 20:15), Max: 37.1 (25 Oct 2019 17:17)  T(F): 98.1 (25 Oct 2019 20:15), Max: 98.7 (25 Oct 2019 17:17)  HR: 81 (26 Oct 2019 05:52) (81 - 86)  BP: 99/61 (26 Oct 2019 05:52) (99/61 - 106/65)  BP(mean): --  RR: 20 (25 Oct 2019 20:15) (20 - 20)  SpO2: 92% (25 Oct 2019 20:15) (91% - 92%)  Daily     Daily     PHYSICAL EXAM:  GENERAL: NAD, Obese   EYES:  conjunctiva and sclera clear  NECK: Supple, No JVD/Carotid Bruit -ve   NERVOUS SYSTEM:  A/O x3,   Lungs : No rales, No rhonchi,   HEART:  No murmur,  No rub, No gallops  ABDOMEN: Soft, NT/ND BS+ Ascitis ++  EXTREMITIES:   + pitting edema, edematous sctotum    LABS:                        8.0    11.98 )-----------( 74       ( 26 Oct 2019 06:16 )             24.4     10-26    134<L>  |  100  |  51.0<H>  ----------------------------<  119<H>  4.2   |  22.0  |  1.58<H>    Ca    8.7      26 Oct 2019 06:16  Phos  2.9     10-26  Mg     1.9     10-26    TPro  5.2<L>  /  Alb  2.8<L>  /  TBili  6.1<H>  /  DBili  x   /  AST  142<H>  /  ALT  54<H>  /  AlkPhos  82  10-26    PT/INR - ( 26 Oct 2019 06:16 )   PT: 23.4 sec;   INR: 1.99 ratio         PTT - ( 25 Oct 2019 08:00 )  PTT:44.9 sec  Urinalysis Basic - ( 24 Oct 2019 18:27 )    Color: Yellow / Appearance: Clear / S.025 / pH: x  Gluc: x / Ketone: Negative  / Bili: Negative / Urobili: Negative mg/dL   Blood: x / Protein: 30 mg/dL / Nitrite: Negative   Leuk Esterase: Negative / RBC: 6-10 /HPF / WBC 0-2   Sq Epi: x / Non Sq Epi: Occasional / Bacteria: Occasional      Magnesium, Serum: 1.9 mg/dL (10-26 @ 06:16)  Phosphorus Level, Serum: 2.9 mg/dL (10-26 @ 06:16)    ABG - ( 25 Oct 2019 13:46 )  pH, Arterial: 7.41  pH, Blood: x     /  pCO2: 42    /  pO2: 60    / HCO3: 26    / Base Excess: 1.8   /  SaO2: 91                    RADIOLOGY & ADDITIONAL TESTS:

## 2019-10-27 LAB
ALBUMIN SERPL ELPH-MCNC: 2.6 G/DL — LOW (ref 3.3–5.2)
ALP SERPL-CCNC: 66 U/L — SIGNIFICANT CHANGE UP (ref 40–120)
ALT FLD-CCNC: 48 U/L — HIGH
AMMONIA BLD-MCNC: 36 UMOL/L — SIGNIFICANT CHANGE UP (ref 11–55)
ANION GAP SERPL CALC-SCNC: 11 MMOL/L — SIGNIFICANT CHANGE UP (ref 5–17)
AST SERPL-CCNC: 107 U/L — HIGH
BASOPHILS # BLD AUTO: 0.07 K/UL — SIGNIFICANT CHANGE UP (ref 0–0.2)
BASOPHILS NFR BLD AUTO: 0.8 % — SIGNIFICANT CHANGE UP (ref 0–2)
BILIRUB SERPL-MCNC: 5.5 MG/DL — HIGH (ref 0.4–2)
BUN SERPL-MCNC: 47 MG/DL — HIGH (ref 8–20)
CALCIUM SERPL-MCNC: 8.7 MG/DL — SIGNIFICANT CHANGE UP (ref 8.6–10.2)
CHLORIDE SERPL-SCNC: 98 MMOL/L — SIGNIFICANT CHANGE UP (ref 98–107)
CO2 SERPL-SCNC: 24 MMOL/L — SIGNIFICANT CHANGE UP (ref 22–29)
CREAT SERPL-MCNC: 1.59 MG/DL — HIGH (ref 0.5–1.3)
EOSINOPHIL # BLD AUTO: 0.26 K/UL — SIGNIFICANT CHANGE UP (ref 0–0.5)
EOSINOPHIL NFR BLD AUTO: 2.9 % — SIGNIFICANT CHANGE UP (ref 0–6)
GLUCOSE BLDC GLUCOMTR-MCNC: 127 MG/DL — HIGH (ref 70–99)
GLUCOSE BLDC GLUCOMTR-MCNC: 132 MG/DL — HIGH (ref 70–99)
GLUCOSE BLDC GLUCOMTR-MCNC: 142 MG/DL — HIGH (ref 70–99)
GLUCOSE SERPL-MCNC: 136 MG/DL — HIGH (ref 70–115)
HCT VFR BLD CALC: 24.6 % — LOW (ref 39–50)
HGB BLD-MCNC: 8.2 G/DL — LOW (ref 13–17)
IMM GRANULOCYTES NFR BLD AUTO: 6.8 % — HIGH (ref 0–1.5)
INR BLD: 1.99 RATIO — HIGH (ref 0.88–1.16)
LYMPHOCYTES # BLD AUTO: 1.25 K/UL — SIGNIFICANT CHANGE UP (ref 1–3.3)
LYMPHOCYTES # BLD AUTO: 14.1 % — SIGNIFICANT CHANGE UP (ref 13–44)
MCHC RBC-ENTMCNC: 32.9 PG — SIGNIFICANT CHANGE UP (ref 27–34)
MCHC RBC-ENTMCNC: 33.3 GM/DL — SIGNIFICANT CHANGE UP (ref 32–36)
MCV RBC AUTO: 98.8 FL — SIGNIFICANT CHANGE UP (ref 80–100)
MONOCYTES # BLD AUTO: 1.23 K/UL — HIGH (ref 0–0.9)
MONOCYTES NFR BLD AUTO: 13.9 % — SIGNIFICANT CHANGE UP (ref 2–14)
NEUTROPHILS # BLD AUTO: 5.45 K/UL — SIGNIFICANT CHANGE UP (ref 1.8–7.4)
NEUTROPHILS NFR BLD AUTO: 61.5 % — SIGNIFICANT CHANGE UP (ref 43–77)
PLATELET # BLD AUTO: 72 K/UL — LOW (ref 150–400)
POTASSIUM SERPL-MCNC: 4.1 MMOL/L — SIGNIFICANT CHANGE UP (ref 3.5–5.3)
POTASSIUM SERPL-SCNC: 4.1 MMOL/L — SIGNIFICANT CHANGE UP (ref 3.5–5.3)
PROT SERPL-MCNC: 5.1 G/DL — LOW (ref 6.6–8.7)
PROTHROM AB SERPL-ACNC: 23.4 SEC — HIGH (ref 10–12.9)
RBC # BLD: 2.49 M/UL — LOW (ref 4.2–5.8)
RBC # FLD: 17.2 % — HIGH (ref 10.3–14.5)
SODIUM SERPL-SCNC: 133 MMOL/L — LOW (ref 135–145)
WBC # BLD: 8.86 K/UL — SIGNIFICANT CHANGE UP (ref 3.8–10.5)
WBC # FLD AUTO: 8.86 K/UL — SIGNIFICANT CHANGE UP (ref 3.8–10.5)

## 2019-10-27 PROCEDURE — 99233 SBSQ HOSP IP/OBS HIGH 50: CPT

## 2019-10-27 PROCEDURE — 99232 SBSQ HOSP IP/OBS MODERATE 35: CPT

## 2019-10-27 RX ORDER — LACTULOSE 10 G/15ML
30 SOLUTION ORAL THREE TIMES A DAY
Refills: 0 | Status: DISCONTINUED | OUTPATIENT
Start: 2019-10-27 | End: 2019-11-02

## 2019-10-27 RX ADMIN — Medication 1 GRAM(S): at 11:09

## 2019-10-27 RX ADMIN — Medication 1 GRAM(S): at 17:26

## 2019-10-27 RX ADMIN — PANTOPRAZOLE SODIUM 40 MILLIGRAM(S): 20 TABLET, DELAYED RELEASE ORAL at 17:26

## 2019-10-27 RX ADMIN — NYSTATIN CREAM 1 APPLICATION(S): 100000 CREAM TOPICAL at 17:26

## 2019-10-27 RX ADMIN — LACTULOSE 30 GRAM(S): 10 SOLUTION ORAL at 13:05

## 2019-10-27 RX ADMIN — TAMSULOSIN HYDROCHLORIDE 0.4 MILLIGRAM(S): 0.4 CAPSULE ORAL at 22:02

## 2019-10-27 RX ADMIN — CEFTRIAXONE 100 MILLIGRAM(S): 500 INJECTION, POWDER, FOR SOLUTION INTRAMUSCULAR; INTRAVENOUS at 11:08

## 2019-10-27 RX ADMIN — IRON SUCROSE 110 MILLIGRAM(S): 20 INJECTION, SOLUTION INTRAVENOUS at 22:00

## 2019-10-27 RX ADMIN — Medication 5 MILLIGRAM(S): at 11:10

## 2019-10-27 RX ADMIN — LIDOCAINE 2 PATCH: 4 CREAM TOPICAL at 23:00

## 2019-10-27 RX ADMIN — Medication 1 GRAM(S): at 06:24

## 2019-10-27 RX ADMIN — LIDOCAINE 2 PATCH: 4 CREAM TOPICAL at 11:08

## 2019-10-27 RX ADMIN — URSODIOL 300 MILLIGRAM(S): 250 TABLET, FILM COATED ORAL at 22:02

## 2019-10-27 RX ADMIN — LIDOCAINE 2 PATCH: 4 CREAM TOPICAL at 19:45

## 2019-10-27 RX ADMIN — URSODIOL 300 MILLIGRAM(S): 250 TABLET, FILM COATED ORAL at 13:06

## 2019-10-27 RX ADMIN — LACTULOSE 20 GRAM(S): 10 SOLUTION ORAL at 06:24

## 2019-10-27 RX ADMIN — ATORVASTATIN CALCIUM 20 MILLIGRAM(S): 80 TABLET, FILM COATED ORAL at 22:02

## 2019-10-27 RX ADMIN — Medication 250 MILLIGRAM(S): at 06:24

## 2019-10-27 RX ADMIN — LACTULOSE 30 GRAM(S): 10 SOLUTION ORAL at 22:01

## 2019-10-27 RX ADMIN — Medication 40 MILLIEQUIVALENT(S): at 11:10

## 2019-10-27 RX ADMIN — Medication 1000 UNIT(S): at 11:08

## 2019-10-27 RX ADMIN — Medication 250 MILLIGRAM(S): at 17:26

## 2019-10-27 RX ADMIN — Medication 40 MILLIGRAM(S): at 13:06

## 2019-10-27 RX ADMIN — Medication 40 MILLIGRAM(S): at 22:01

## 2019-10-27 RX ADMIN — URSODIOL 300 MILLIGRAM(S): 250 TABLET, FILM COATED ORAL at 06:24

## 2019-10-27 RX ADMIN — Medication 40 MILLIGRAM(S): at 06:24

## 2019-10-27 RX ADMIN — NYSTATIN CREAM 1 APPLICATION(S): 100000 CREAM TOPICAL at 06:24

## 2019-10-27 RX ADMIN — PANTOPRAZOLE SODIUM 40 MILLIGRAM(S): 20 TABLET, DELAYED RELEASE ORAL at 06:23

## 2019-10-27 NOTE — PROGRESS NOTE ADULT - PROBLEM SELECTOR PROBLEM 2
Gastrointestinal hemorrhage, unspecified gastrointestinal hemorrhage type
Cirrhosis, non-alcoholic
Cirrhosis, non-alcoholic

## 2019-10-27 NOTE — PROGRESS NOTE ADULT - PROBLEM SELECTOR PROBLEM 3
Hepatic encephalopathy
Gastrointestinal hemorrhage, unspecified gastrointestinal hemorrhage type
Hepatic encephalopathy

## 2019-10-27 NOTE — PROGRESS NOTE ADULT - PROBLEM SELECTOR PLAN 3
Continue current Lactulose and Xifaxan therapies.
EGD tomorrow if OK. EGD was cancelled Friday because of lethargy secondary to HE. He appears significantly less lethargic now. Lactulose to be increased as outlined above. Repeat labs ordered for the AM.
See above management plan recommendations.

## 2019-10-27 NOTE — PROGRESS NOTE ADULT - SUBJECTIVE AND OBJECTIVE BOX
CC: Follow up    INTERVAL HPI/OVERNIGHT EVENTS: Patient seen and examined, complaints of abdominal distension. Appetite improving slowly.       Vital Signs Last 24 Hrs  T(C): 36.4 (27 Oct 2019 08:20), Max: 36.4 (27 Oct 2019 08:20)  T(F): 97.6 (27 Oct 2019 08:20), Max: 97.6 (27 Oct 2019 08:20)  HR: 80 (27 Oct 2019 08:20) (64 - 82)  BP: 91/40 (27 Oct 2019 08:20) (91/40 - 126/68)  BP(mean): --  RR: 18 (27 Oct 2019 08:20) (18 - 20)  SpO2: 91% (27 Oct 2019 10:45) (91% - 93%)    PHYSICAL EXAM:    GENERAL: NAD, AOX3 Obese   HEAD:  Atraumatic, Normocephalic  ENMT: Moist mucous membranes  CHEST/LUNG: Clear to auscultation bilaterally; No rales, rhonchi, wheezing, or rubs  HEART: Regular rate and rhythm; No murmurs, rubs, or gallops  ABDOMEN: Soft, Nontender, + Distension; Bowel sounds present  EXTREMITIES:  2+ Peripheral Pulses, No clubbing, cyanosis  2+ pedal edema       MEDICATIONS  (STANDING):  atorvastatin 20 milliGRAM(s) Oral at bedtime  cefTRIAXone   IVPB 2000 milliGRAM(s) IV Intermittent <User Schedule>  cholecalciferol 1000 Unit(s) Oral daily  dextrose 5%. 1000 milliLiter(s) (50 mL/Hr) IV Continuous <Continuous>  dextrose 50% Injectable 12.5 Gram(s) IV Push once  dextrose 50% Injectable 25 Gram(s) IV Push once  dextrose 50% Injectable 25 Gram(s) IV Push once  furosemide   Injectable 40 milliGRAM(s) IV Push three times a day  influenza   Vaccine 0.5 milliLiter(s) IntraMuscular once  iron sucrose IVPB 200 milliGRAM(s) IV Intermittent every 24 hours  lactulose Syrup 30 Gram(s) Oral three times a day  lidocaine   Patch 2 Patch Transdermal daily  nystatin Ointment 1 Application(s) Topical two times a day  pantoprazole  Injectable 40 milliGRAM(s) IV Push two times a day  phytonadione   Solution 5 milliGRAM(s) Oral daily  potassium chloride    Tablet ER 40 milliEquivalent(s) Oral daily  rifAXIMin 550 milliGRAM(s) Oral two times a day  saccharomyces boulardii 250 milliGRAM(s) Oral two times a day  sucralfate 1 Gram(s) Oral every 6 hours  tamsulosin 0.4 milliGRAM(s) Oral at bedtime  ursodiol Capsule 300 milliGRAM(s) Oral three times a day    MEDICATIONS  (PRN):  dextrose 40% Gel 15 Gram(s) Oral once PRN Blood Glucose LESS THAN 70 milliGRAM(s)/deciliter  glucagon  Injectable 1 milliGRAM(s) IntraMuscular once PRN Glucose LESS THAN 70 milligrams/deciliter  ondansetron Injectable 4 milliGRAM(s) IV Push every 6 hours PRN Nausea and/or Vomiting      Allergies    codeine (Anaphylaxis)    Intolerances    NO  RED MEAT (Unknown)        LABS:                          8.2    8.86  )-----------( 72       ( 27 Oct 2019 05:41 )             24.6     10-27    133<L>  |  98  |  47.0<H>  ----------------------------<  136<H>  4.1   |  24.0  |  1.59<H>    Ca    8.7      27 Oct 2019 05:41  Phos  2.9     10-26  Mg     1.9     10-26    TPro  5.1<L>  /  Alb  2.6<L>  /  TBili  5.5<H>  /  DBili  x   /  AST  107<H>  /  ALT  48<H>  /  AlkPhos  66  10-27    PT/INR - ( 27 Oct 2019 05:41 )   PT: 23.4 sec;   INR: 1.99 ratio               RADIOLOGY & ADDITIONAL TESTS:

## 2019-10-27 NOTE — PROGRESS NOTE ADULT - SUBJECTIVE AND OBJECTIVE BOX
[facility]  INFECTIOUS DISEASES AND INTERNAL MEDICINE at [place]  =======================================================  Tavo Lyon MD  Diplomates American Board of Internal Medicine and Infectious Diseases  Tel: [phone]      Fax: [phone]  =======================================================      Follow up- Grp B strep bacteremia    No fever, no complaint, no abdominal pain.     Antibiotics:   cefTRIAXone   IVPB 2000 milliGRAM(s) IV Intermittent <User Schedule>    REVIEW OF SYSTEMS:  CONSTITUTIONAL:  No Fever. + chills  HEENT:  No diplopia or blurred vision.  No earache, sore throat or runny nose.  CARDIOVASCULAR:  No chest pain or SOB  RESPIRATORY:  No cough, shortness of breath  GASTROINTESTINAL:  No nausea, vomiting or diarrhea.  GENITOURINARY:  No dysuria, frequency or urgency.   MUSCULOSKELETAL:  no joint aches, no muscle pain  SKIN:  No change in skin, hair or nails.  NEUROLOGIC:  No Headaches, seizures   PSYCHIATRIC:  No disorder of thought or mood.  ENDOCRINE:  No heat or cold intolerance  HEMATOLOGICAL:  No easy bruising or bleeding.     Physical Exam:  Vital Signs Last 24 Hrs  T(C): 36.4 (27 Oct 2019 08:20), Max: 36.4 (27 Oct 2019 08:20)  T(F): 97.6 (27 Oct 2019 08:20), Max: 97.6 (27 Oct 2019 08:20)  HR: 80 (27 Oct 2019 08:20) (64 - 82)  BP: 91/40 (27 Oct 2019 08:20) (91/40 - 126/68)  RR: 18 (27 Oct 2019 08:20) (18 - 20)  SpO2: 91% (27 Oct 2019 10:45) (91% - 93%)  GEN: NAD, morbidly obese  HEENT: normocephalic and atraumatic. EOMI. PERRL.  Anicteric  NECK: Supple.   LUNGS: Clear to auscultation.  HEART: Regular rate and rhythm   ABDOMEN: Soft, nontender, and nondistended.  Positive bowel sounds.    : No CVA tenderness  EXTREMITIES: Without any edema.  MSK: No joint swelling  NEUROLOGIC: grossly intact   PSYCHIATRIC: Appropriate affect .  SKIN: No Rash    Labs:  10-27    133<L>  |  98  |  47.0<H>  ----------------------------<  136<H>  4.1   |  24.0  |  1.59<H>    Ca    8.7      27 Oct 2019 05:41  Phos  2.9     10-26  Mg     1.9     10-26    TPro  5.1<L>  /  Alb  2.6<L>  /  TBili  5.5<H>  /  DBili  x   /  AST  107<H>  /  ALT  48<H>  /  AlkPhos  66  10-27                        8.2    8.86  )-----------( 72       ( 27 Oct 2019 05:41 )             24.6     PT/INR - ( 27 Oct 2019 05:41 )   PT: 23.4 sec;   INR: 1.99 ratio      LIVER FUNCTIONS - ( 27 Oct 2019 05:41 )  Alb: 2.6 g/dL / Pro: 5.1 g/dL / ALK PHOS: 66 U/L / ALT: 48 U/L / AST: 107 U/L / GGT: x           RECENT CULTURES:  10-24 @ 11:46 .Blood     No growth at 48 hours    10-23 @ 18:14 .Blood     No growth at 48 hours    10-22 @ 21:39 .Urine     No growth    10-22 @ 19:18      NotDetec    10-22 @ 19:03 .Body Fluid     No growth at 4 days.  Culture in progress    Few White blood cells  No organisms seen    10-22 @ 17:55 .Blood Streptococcus agalactiae (Group B)    Growth in aerobic and anaerobic bottles: Streptococcus agalactiae (Group  B)  Aerobic Bottle: 09:17 Hours to positivity  Anaerobic Bottle: 09:27 Hours to positivity    from: CT Abdomen and Pelvis No Cont (10.23.19 @ 09:00)   EXAM:  CT ABDOMEN AND PELVIS                        PROCEDURE DATE:  10/23/2019    INTERPRETATION:  CLINICAL INFORMATION: Sepsis of unknown source. Elevated   bilirubin.  COMPARISON: CT scan of the abdomen and pelvis from 10/8/2019  PROCEDURE:   CT of the Abdomen and Pelvis was performed without intravenous contrast.   Intravenous contrast: None. This limits evaluation of vascular structures.  Oral contrast: None.  Sagittal and coronal reformats were performed.  FINDINGS:  LOWER CHEST: Small bilateral pleural effusions which were have increased   and bibasilar atelectasis.  LIVER: Slightly nodular in contour with a prominent left lobe raising   concern for cirrhosis. Please correlate clinically.  BILE DUCTS: Normal caliber.  GALLBLADDER: Status post cholecystectomy  SPLEEN: Mildly prominent measuring up to 13.3 cm. This may reflect portal   hypertension. Please correlate clinically.  PANCREAS: Within normal limits.  ADRENALS: Within normal limits.  KIDNEYS/URETERS: 4 mm nonobstructing stone in the lower pole of the left   kidney.  BLADDER: Collapsed and contains a Beasley catheter.  REPRODUCTIVE ORGANS: Grossly unremarkable.  BOWEL: No bowel obstruction. Appendix unremarkable.  PERITONEUM: Mild to moderate ascites is unchanged.  VESSELS: Varices. Large varices are seen to the left of the abdominal   aorta  RETROPERITONEUM/LYMPH NODES: No lymphadenopathy.    ABDOMINAL WALL: Subcutaneous edema.  BONES: Degenerative changes of bone.  IMPRESSION:   Findings raising concern for cirrhosis. Mildly prominent spleen and   varices may reflect portal hypertension. Please correlate clinically..   Ascites which has not significantly changed. Small bilateral pleural   effusions has increased.    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 Patient came in with sudden onset chills. In the ER patient was febrile to 102.6F, hypoglycemic, in sepsis, admitted to MICU. Was started on Vancomycin and Zosyn. Blood cultures with group B strep, strep is common with SBP but he doesn't have symptoms.     Group B Strep septicemia   ascites/fluid overload  non-alcoholic cirrhosis    - Blood culture strep group B on 10/22, source unclear could be mouth   - Repeat blood cultures on 10/23 and 10/24 negative   - WBC downtrending today 8k   - c/o right hip pain- X ray right hip unremarkable  - Check TTE   - CT A/P with cirrhosis. Prior Hepatitis panel negative  - Continue Ceftriaxone 2 g IV q 24 hours  - Trend Fever  - Trend Leukocytosis    Will Follow [facility]  INFECTIOUS DISEASES AND INTERNAL MEDICINE at [place]  =======================================================  Tavo Lyon MD  Diplomates American Board of Internal Medicine and Infectious Diseases  Tel: [phone]      Fax: [phone]  =======================================================      Follow up- Grp B strep bacteremia    No fever, no complaint, no abdominal pain.     Antibiotics:   cefTRIAXone   IVPB 2000 milliGRAM(s) IV Intermittent <User Schedule>    REVIEW OF SYSTEMS:  CONSTITUTIONAL:  No Fever. + chills  HEENT:  No diplopia or blurred vision.  No earache, sore throat or runny nose.  CARDIOVASCULAR:  No chest pain or SOB  RESPIRATORY:  No cough, shortness of breath  GASTROINTESTINAL:  No nausea, vomiting or diarrhea.  GENITOURINARY:  No dysuria, frequency or urgency.   MUSCULOSKELETAL:  no joint aches, no muscle pain  SKIN:  No change in skin, hair or nails.  NEUROLOGIC:  No Headaches, seizures   PSYCHIATRIC:  No disorder of thought or mood.  ENDOCRINE:  No heat or cold intolerance  HEMATOLOGICAL:  No easy bruising or bleeding.     Physical Exam:  Vital Signs Last 24 Hrs  T(C): 36.4 (27 Oct 2019 08:20), Max: 36.4 (27 Oct 2019 08:20)  T(F): 97.6 (27 Oct 2019 08:20), Max: 97.6 (27 Oct 2019 08:20)  HR: 80 (27 Oct 2019 08:20) (64 - 82)  BP: 91/40 (27 Oct 2019 08:20) (91/40 - 126/68)  RR: 18 (27 Oct 2019 08:20) (18 - 20)  SpO2: 91% (27 Oct 2019 10:45) (91% - 93%)  GEN: NAD, morbidly obese  HEENT: normocephalic and atraumatic. EOMI. PERRL.  Anicteric  NECK: Supple.   LUNGS: Clear to auscultation.  HEART: Regular rate and rhythm   ABDOMEN: Soft, nontender, and nondistended.  Positive bowel sounds.    : No CVA tenderness  EXTREMITIES: Without any edema.  MSK: No joint swelling  NEUROLOGIC: grossly intact   PSYCHIATRIC: Appropriate affect .  SKIN: No Rash    Labs:  10-27    133<L>  |  98  |  47.0<H>  ----------------------------<  136<H>  4.1   |  24.0  |  1.59<H>    Ca    8.7      27 Oct 2019 05:41  Phos  2.9     10-26  Mg     1.9     10-26    TPro  5.1<L>  /  Alb  2.6<L>  /  TBili  5.5<H>  /  DBili  x   /  AST  107<H>  /  ALT  48<H>  /  AlkPhos  66  10-27                        8.2    8.86  )-----------( 72       ( 27 Oct 2019 05:41 )             24.6     PT/INR - ( 27 Oct 2019 05:41 )   PT: 23.4 sec;   INR: 1.99 ratio      LIVER FUNCTIONS - ( 27 Oct 2019 05:41 )  Alb: 2.6 g/dL / Pro: 5.1 g/dL / ALK PHOS: 66 U/L / ALT: 48 U/L / AST: 107 U/L / GGT: x           RECENT CULTURES:  10-24 @ 11:46 .Blood     No growth at 48 hours    10-23 @ 18:14 .Blood     No growth at 48 hours    10-22 @ 21:39 .Urine     No growth    10-22 @ 19:18      NotDetec    10-22 @ 19:03 .Body Fluid     No growth at 4 days.  Culture in progress    Few White blood cells  No organisms seen    10-22 @ 17:55 .Blood Streptococcus agalactiae (Group B)    Growth in aerobic and anaerobic bottles: Streptococcus agalactiae (Group  B)  Aerobic Bottle: 09:17 Hours to positivity  Anaerobic Bottle: 09:27 Hours to positivity    from: CT Abdomen and Pelvis No Cont (10.23.19 @ 09:00)   EXAM:  CT ABDOMEN AND PELVIS                        PROCEDURE DATE:  10/23/2019    INTERPRETATION:  CLINICAL INFORMATION: Sepsis of unknown source. Elevated   bilirubin.  COMPARISON: CT scan of the abdomen and pelvis from 10/8/2019  PROCEDURE:   CT of the Abdomen and Pelvis was performed without intravenous contrast.   Intravenous contrast: None. This limits evaluation of vascular structures.  Oral contrast: None.  Sagittal and coronal reformats were performed.  FINDINGS:  LOWER CHEST: Small bilateral pleural effusions which were have increased   and bibasilar atelectasis.  LIVER: Slightly nodular in contour with a prominent left lobe raising   concern for cirrhosis. Please correlate clinically.  BILE DUCTS: Normal caliber.  GALLBLADDER: Status post cholecystectomy  SPLEEN: Mildly prominent measuring up to 13.3 cm. This may reflect portal   hypertension. Please correlate clinically.  PANCREAS: Within normal limits.  ADRENALS: Within normal limits.  KIDNEYS/URETERS: 4 mm nonobstructing stone in the lower pole of the left   kidney.  BLADDER: Collapsed and contains a Beasley catheter.  REPRODUCTIVE ORGANS: Grossly unremarkable.  BOWEL: No bowel obstruction. Appendix unremarkable.  PERITONEUM: Mild to moderate ascites is unchanged.  VESSELS: Varices. Large varices are seen to the left of the abdominal   aorta  RETROPERITONEUM/LYMPH NODES: No lymphadenopathy.    ABDOMINAL WALL: Subcutaneous edema.  BONES: Degenerative changes of bone.  IMPRESSION:   Findings raising concern for cirrhosis. Mildly prominent spleen and   varices may reflect portal hypertension. Please correlate clinically..   Ascites which has not significantly changed. Small bilateral pleural   effusions has increased.    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 Patient came in with sudden onset chills. In the ER patient was febrile to 102.6F, hypoglycemic, in sepsis, admitted to MICU. Was started on Vancomycin and Zosyn. Blood cultures with group B strep, strep is common with SBP but he doesn't have symptoms.     Group B Strep septicemia   ascites/fluid overload  non-alcoholic cirrhosis    - Blood culture strep group B on 10/22, source unclear could be mouth   - Repeat blood cultures on 10/23 and 10/24 negative   - WBC downtrending today 8k   - c/o right hip pain- X ray right hip unremarkable  - TTE negative for vegetations  - Most likely 2weeks treatment from first neg blood culture   - CT A/P with cirrhosis. Prior Hepatitis panel negative  - Continue Ceftriaxone 2 g IV q 24 hours  - Trend Fever  - Trend Leukocytosis    Will Follow

## 2019-10-27 NOTE — PROGRESS NOTE ADULT - SUBJECTIVE AND OBJECTIVE BOX
NEPHROLOGY INTERVAL HPI/OVERNIGHT EVENTS:    Examined  More alert  Edematous- improving    MEDICATIONS  (STANDING):  atorvastatin 20 milliGRAM(s) Oral at bedtime  cefTRIAXone   IVPB 2000 milliGRAM(s) IV Intermittent <User Schedule>  cholecalciferol 1000 Unit(s) Oral daily  dextrose 5%. 1000 milliLiter(s) (50 mL/Hr) IV Continuous <Continuous>  dextrose 50% Injectable 12.5 Gram(s) IV Push once  dextrose 50% Injectable 25 Gram(s) IV Push once  dextrose 50% Injectable 25 Gram(s) IV Push once  furosemide   Injectable 40 milliGRAM(s) IV Push three times a day  influenza   Vaccine 0.5 milliLiter(s) IntraMuscular once  iron sucrose IVPB 200 milliGRAM(s) IV Intermittent every 24 hours  lactulose Syrup 30 Gram(s) Oral three times a day  lidocaine   Patch 2 Patch Transdermal daily  nystatin Ointment 1 Application(s) Topical two times a day  pantoprazole  Injectable 40 milliGRAM(s) IV Push two times a day  phytonadione   Solution 5 milliGRAM(s) Oral daily  potassium chloride    Tablet ER 40 milliEquivalent(s) Oral daily  rifAXIMin 550 milliGRAM(s) Oral two times a day  saccharomyces boulardii 250 milliGRAM(s) Oral two times a day  sucralfate 1 Gram(s) Oral every 6 hours  tamsulosin 0.4 milliGRAM(s) Oral at bedtime  ursodiol Capsule 300 milliGRAM(s) Oral three times a day    MEDICATIONS  (PRN):  dextrose 40% Gel 15 Gram(s) Oral once PRN Blood Glucose LESS THAN 70 milliGRAM(s)/deciliter  glucagon  Injectable 1 milliGRAM(s) IntraMuscular once PRN Glucose LESS THAN 70 milligrams/deciliter  ondansetron Injectable 4 milliGRAM(s) IV Push every 6 hours PRN Nausea and/or Vomiting      Allergies    codeine (Anaphylaxis)    Intolerances    NO  RED MEAT (Unknown)      Vital Signs Last 24 Hrs  T(C): 36.4 (27 Oct 2019 08:20), Max: 36.4 (27 Oct 2019 08:20)  T(F): 97.6 (27 Oct 2019 08:20), Max: 97.6 (27 Oct 2019 08:20)  HR: 80 (27 Oct 2019 08:20) (64 - 82)  BP: 91/40 (27 Oct 2019 08:20) (91/40 - 126/68)  BP(mean): --  RR: 18 (27 Oct 2019 08:20) (18 - 20)  SpO2: 93% (27 Oct 2019 08:20) (93% - 93%)  Daily     Daily     PHYSICAL EXAM:  GENERAL: NAD, Obese   EYES:  conjunctiva and sclera clear  NECK: Supple, No JVD/Carotid Bruit -ve   NERVOUS SYSTEM:  A/O x3,   Lungs : No rales, No rhonchi,   HEART:  No murmur,  No rub, No gallops  ABDOMEN: Soft, NT/ND BS+ Ascitis ++  EXTREMITIES:   + pitting edema, edematous sctotum    LABS:                        8.2    8.86  )-----------( 72       ( 27 Oct 2019 05:41 )             24.6     10-27    133<L>  |  98  |  47.0<H>  ----------------------------<  136<H>  4.1   |  24.0  |  1.59<H>    Ca    8.7      27 Oct 2019 05:41  Phos  2.9     10-26  Mg     1.9     10-26    TPro  5.1<L>  /  Alb  2.6<L>  /  TBili  5.5<H>  /  DBili  x   /  AST  107<H>  /  ALT  48<H>  /  AlkPhos  66  10-27    PT/INR - ( 27 Oct 2019 05:41 )   PT: 23.4 sec;   INR: 1.99 ratio               ABG - ( 25 Oct 2019 13:46 )  pH, Arterial: 7.41  pH, Blood: x     /  pCO2: 42    /  pO2: 60    / HCO3: 26    / Base Excess: 1.8   /  SaO2: 91                    RADIOLOGY & ADDITIONAL TESTS:

## 2019-10-27 NOTE — PROGRESS NOTE ADULT - PROBLEM SELECTOR PLAN 2
See above.
EGD tomorrow if remains stable overnight. NPO after MN tonight. Repeat labs ordered.
Likely secondary to NAFLD. Pt appears lethargic and may be mildly encephalopathic. Lactulose increased to 20 grams PO TID. Same Xifaxan therapy.

## 2019-10-27 NOTE — PROGRESS NOTE ADULT - ASSESSMENT
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 He was found to be hypoglycemic ~29 and had a TMax ~102.6. He was fluid resuscitated, given Abx Zosyn/ Vanco and also one amp D50%, repeat FS ~ 69 for which he received another amp D50. Lactate was 4.1 ICU consulted for fever, persistent hypoglycemia and hypotension. Patient takes Lantus 40U and Tradjenta 5mg at home which he was compliant with; but he has not been eating well. In the ED, he had a diagnostic paracentesis ~ 20cc bloody fluid was drained. Fluid cultures were negative. Found to have gram positive bacteremia and sepsis.  Downgraded to the hospitalist service for further management. CT abdomen and pelvis consistent with hepatomegaly and cirrhosis. GI was consulted; Hepatitis panel negative.     Assessment/Plan:    1. Group B bacteremia: Source unclear  Repeat blood cultures on 10/23 were negative  IV rocephin x 2 weeks    2. Cirrhosis likely secondary to NAFLD: IV lasix for diuresis  ON lactulose TID  Elevated INR; PO vitamin K ordered    3. MISA secondary to volume overload  Renal ultrasound was normal    4. Anemia: suspected secondary to GI bleed  EGD was cancelled Friday for elevated INR; Tentatively rescheduled for Monday pending INR < 2  Monitor hb/hct     5. Acute urinary retention: Beasley in place   Flomax day 4  TOV in AM     6. Thrombocytopenia likely secondary to cirrhosis     DVT PPE SCDs- thrombocytopenia and bleeding risk     Prognosis guarded.

## 2019-10-27 NOTE — PROGRESS NOTE ADULT - SUBJECTIVE AND OBJECTIVE BOX
Pt seen and examined. Alert but still no BM with Lactulose 20 g. TID. No c/o abdominal pain. INR noted 1.99. Hb OK at 8.2 grams.    REVIEW OF SYSTEMS:  Constitutional: No fever, weight loss or fatigue  Cardiovascular: No chest pain, palpitations, dizziness or leg swelling  Gastrointestinal: As noted above. No abdominal or epigastric pain. No nausea, vomiting or hematemesis; No diarrhea. No melena or hematochezia.  Skin: No itching, burning, rashes or lesions       MEDICATIONS:  MEDICATIONS  (STANDING):  atorvastatin 20 milliGRAM(s) Oral at bedtime  cefTRIAXone   IVPB 2000 milliGRAM(s) IV Intermittent <User Schedule>  cholecalciferol 1000 Unit(s) Oral daily  dextrose 5%. 1000 milliLiter(s) (50 mL/Hr) IV Continuous <Continuous>  dextrose 50% Injectable 12.5 Gram(s) IV Push once  dextrose 50% Injectable 25 Gram(s) IV Push once  dextrose 50% Injectable 25 Gram(s) IV Push once  furosemide   Injectable 40 milliGRAM(s) IV Push three times a day  influenza   Vaccine 0.5 milliLiter(s) IntraMuscular once  iron sucrose IVPB 200 milliGRAM(s) IV Intermittent every 24 hours  lactulose Syrup 30 Gram(s) Oral three times a day  lidocaine   Patch 2 Patch Transdermal daily  nystatin Ointment 1 Application(s) Topical two times a day  pantoprazole  Injectable 40 milliGRAM(s) IV Push two times a day  phytonadione   Solution 5 milliGRAM(s) Oral daily  potassium chloride    Tablet ER 40 milliEquivalent(s) Oral daily  rifAXIMin 550 milliGRAM(s) Oral two times a day  saccharomyces boulardii 250 milliGRAM(s) Oral two times a day  sucralfate 1 Gram(s) Oral every 6 hours  tamsulosin 0.4 milliGRAM(s) Oral at bedtime  ursodiol Capsule 300 milliGRAM(s) Oral three times a day    MEDICATIONS  (PRN):  dextrose 40% Gel 15 Gram(s) Oral once PRN Blood Glucose LESS THAN 70 milliGRAM(s)/deciliter  glucagon  Injectable 1 milliGRAM(s) IntraMuscular once PRN Glucose LESS THAN 70 milligrams/deciliter  ondansetron Injectable 4 milliGRAM(s) IV Push every 6 hours PRN Nausea and/or Vomiting      Allergies    codeine (Anaphylaxis)    Intolerances    NO  RED MEAT (Unknown)      Vital Signs Last 24 Hrs  T(C): 36.4 (27 Oct 2019 08:20), Max: 36.4 (27 Oct 2019 08:20)  T(F): 97.6 (27 Oct 2019 08:20), Max: 97.6 (27 Oct 2019 08:20)  HR: 80 (27 Oct 2019 08:20) (64 - 82)  BP: 91/40 (27 Oct 2019 08:20) (91/40 - 126/68)  BP(mean): --  RR: 18 (27 Oct 2019 08:20) (18 - 20)  SpO2: 93% (27 Oct 2019 08:20) (93% - 93%)    10-26 @ 07:01  -  10-27 @ 07:00  --------------------------------------------------------  IN: 870 mL / OUT: 1450 mL / NET: -580 mL        PHYSICAL EXAM:    General: Well developed; morbidly obese; in no acute distress  HEENT: MMM, conjunctiva pink and sclera anicteric.  Lungs: clear to auscultation bilaterally.  Cor: RRR S1, S2 only.  Gastrointestinal: Abdomen: Soft, obese, non-tender non-distended; Normal bowel sounds; No hepatosplenomegaly  Extremities: no cyanosis, clubbing or edema.  Skin: Warm and dry. No obvious rash  Neuro: Pt. a + o x 3, less lethargic.    LABS:  ABG - ( 25 Oct 2019 13:46 )  pH, Arterial: 7.41  pH, Blood: x     /  pCO2: 42    /  pO2: 60    / HCO3: 26    / Base Excess: 1.8   /  SaO2: 91                  CBC Full  -  ( 27 Oct 2019 05:41 )  WBC Count : 8.86 K/uL  RBC Count : 2.49 M/uL  Hemoglobin : 8.2 g/dL  Hematocrit : 24.6 %  Platelet Count - Automated : 72 K/uL  Mean Cell Volume : 98.8 fl  Mean Cell Hemoglobin : 32.9 pg  Mean Cell Hemoglobin Concentration : 33.3 gm/dL  Auto Neutrophil # : 5.45 K/uL  Auto Lymphocyte # : 1.25 K/uL  Auto Monocyte # : 1.23 K/uL  Auto Eosinophil # : 0.26 K/uL  Auto Basophil # : 0.07 K/uL  Auto Neutrophil % : 61.5 %  Auto Lymphocyte % : 14.1 %  Auto Monocyte % : 13.9 %  Auto Eosinophil % : 2.9 %  Auto Basophil % : 0.8 %    10-27    133<L>  |  98  |  47.0<H>  ----------------------------<  136<H>  4.1   |  24.0  |  1.59<H>    Ca    8.7      27 Oct 2019 05:41  Phos  2.9     10-26  Mg     1.9     10-26    TPro  5.1<L>  /  Alb  2.6<L>  /  TBili  5.5<H>  /  DBili  x   /  AST  107<H>  /  ALT  48<H>  /  AlkPhos  66  10-27    PT/INR - ( 27 Oct 2019 05:41 )   PT: 23.4 sec;   INR: 1.99 ratio                           RADIOLOGY & ADDITIONAL STUDIES (The following images were personally reviewed):

## 2019-10-27 NOTE — PROGRESS NOTE ADULT - PROBLEM SELECTOR PROBLEM 1
Cirrhosis, non-alcoholic
Gastrointestinal hemorrhage, unspecified gastrointestinal hemorrhage type
Hepatic encephalopathy

## 2019-10-27 NOTE — PROGRESS NOTE ADULT - PROBLEM SELECTOR PLAN 1
Decompensated. Vit K x 3 days ordered. Repeat PT/ INR daily. Repeat attempt at EGD Monday.
EGD postponed for reasons outlined above. Vitamin K ordered. Repeat labs ordered. Will re-attempt EGD Monday assuming INR can be brought down to less than 2 by then.
Improving. Lactulose increased from 20 g. to 30 g. TID. Same Xifaxan therapy. Repeat labs ordered for the AM.

## 2019-10-27 NOTE — PROGRESS NOTE ADULT - ASSESSMENT
MISA on CKD? - suspect ATN 2/2 hypotension sepsis   pre renal or HRS ; May also have AIN -but less likely    Check 24 hr urine for protein and Cr CL- ongoing  Very edematous will cont lasix 40mg IV Q8hrs  - will need to tolerate a degree of azotemia  - no hydronephrosis on renal sono    Anemia  GI following   cont Venofer    Hyponatremia/ cirrhosis - clinically volume overloaded- cont diuretics    Will follow

## 2019-10-28 LAB
ALBUMIN SERPL ELPH-MCNC: 2.5 G/DL — LOW (ref 3.3–5.2)
ALP SERPL-CCNC: 74 U/L — SIGNIFICANT CHANGE UP (ref 40–120)
ALT FLD-CCNC: 45 U/L — HIGH
ANION GAP SERPL CALC-SCNC: 11 MMOL/L — SIGNIFICANT CHANGE UP (ref 5–17)
ANISOCYTOSIS BLD QL: SLIGHT — SIGNIFICANT CHANGE UP
AST SERPL-CCNC: 92 U/L — HIGH
BASO STIPL BLD QL SMEAR: PRESENT — SIGNIFICANT CHANGE UP
BASOPHILS # BLD AUTO: 0 K/UL — SIGNIFICANT CHANGE UP (ref 0–0.2)
BASOPHILS NFR BLD AUTO: 0 % — SIGNIFICANT CHANGE UP (ref 0–2)
BILIRUB SERPL-MCNC: 4.9 MG/DL — HIGH (ref 0.4–2)
BUN SERPL-MCNC: 44 MG/DL — HIGH (ref 8–20)
CALCIUM SERPL-MCNC: 8.6 MG/DL — SIGNIFICANT CHANGE UP (ref 8.6–10.2)
CHLORIDE SERPL-SCNC: 98 MMOL/L — SIGNIFICANT CHANGE UP (ref 98–107)
CO2 SERPL-SCNC: 24 MMOL/L — SIGNIFICANT CHANGE UP (ref 22–29)
CREAT SERPL-MCNC: 1.17 MG/DL — SIGNIFICANT CHANGE UP (ref 0.5–1.3)
CULTURE RESULTS: SIGNIFICANT CHANGE UP
CULTURE RESULTS: SIGNIFICANT CHANGE UP
EOSINOPHIL # BLD AUTO: 0.31 K/UL — SIGNIFICANT CHANGE UP (ref 0–0.5)
EOSINOPHIL NFR BLD AUTO: 4.7 % — SIGNIFICANT CHANGE UP (ref 0–6)
GLUCOSE BLDC GLUCOMTR-MCNC: 127 MG/DL — HIGH (ref 70–99)
GLUCOSE BLDC GLUCOMTR-MCNC: 128 MG/DL — HIGH (ref 70–99)
GLUCOSE BLDC GLUCOMTR-MCNC: 144 MG/DL — HIGH (ref 70–99)
GLUCOSE BLDC GLUCOMTR-MCNC: 145 MG/DL — HIGH (ref 70–99)
GLUCOSE BLDC GLUCOMTR-MCNC: 151 MG/DL — HIGH (ref 70–99)
GLUCOSE SERPL-MCNC: 140 MG/DL — HIGH (ref 70–115)
HCT VFR BLD CALC: 25.8 % — LOW (ref 39–50)
HGB BLD-MCNC: 8.5 G/DL — LOW (ref 13–17)
INR BLD: 2.13 RATIO — HIGH (ref 0.88–1.16)
LYMPHOCYTES # BLD AUTO: 0.56 K/UL — LOW (ref 1–3.3)
LYMPHOCYTES # BLD AUTO: 8.4 % — LOW (ref 13–44)
MACROCYTES BLD QL: SLIGHT — SIGNIFICANT CHANGE UP
MANUAL SMEAR VERIFICATION: SIGNIFICANT CHANGE UP
MCHC RBC-ENTMCNC: 32.6 PG — SIGNIFICANT CHANGE UP (ref 27–34)
MCHC RBC-ENTMCNC: 32.9 GM/DL — SIGNIFICANT CHANGE UP (ref 32–36)
MCV RBC AUTO: 98.9 FL — SIGNIFICANT CHANGE UP (ref 80–100)
MICROCYTES BLD QL: SLIGHT — SIGNIFICANT CHANGE UP
MONOCYTES # BLD AUTO: 0.56 K/UL — SIGNIFICANT CHANGE UP (ref 0–0.9)
MONOCYTES NFR BLD AUTO: 8.4 % — SIGNIFICANT CHANGE UP (ref 2–14)
MYELOCYTES NFR BLD: 2.8 % — HIGH (ref 0–0)
NEUTROPHILS # BLD AUTO: 5.03 K/UL — SIGNIFICANT CHANGE UP (ref 1.8–7.4)
NEUTROPHILS NFR BLD AUTO: 67.3 % — SIGNIFICANT CHANGE UP (ref 43–77)
NEUTS BAND # BLD: 8.4 % — HIGH (ref 0–8)
NRBC # BLD: 1 /100 — HIGH (ref 0–0)
PLAT MORPH BLD: NORMAL — SIGNIFICANT CHANGE UP
PLATELET # BLD AUTO: 75 K/UL — LOW (ref 150–400)
POTASSIUM SERPL-MCNC: 3.8 MMOL/L — SIGNIFICANT CHANGE UP (ref 3.5–5.3)
POTASSIUM SERPL-SCNC: 3.8 MMOL/L — SIGNIFICANT CHANGE UP (ref 3.5–5.3)
PROT SERPL-MCNC: 5 G/DL — LOW (ref 6.6–8.7)
PROTHROM AB SERPL-ACNC: 25.1 SEC — HIGH (ref 10–12.9)
RBC # BLD: 2.61 M/UL — LOW (ref 4.2–5.8)
RBC # FLD: 17.3 % — HIGH (ref 10.3–14.5)
RBC BLD AUTO: ABNORMAL
SODIUM SERPL-SCNC: 133 MMOL/L — LOW (ref 135–145)
SPECIMEN SOURCE: SIGNIFICANT CHANGE UP
SPECIMEN SOURCE: SIGNIFICANT CHANGE UP
TARGETS BLD QL SMEAR: SLIGHT — SIGNIFICANT CHANGE UP
UUN UR-MCNC: 471 MG/DL — SIGNIFICANT CHANGE UP
WBC # BLD: 6.64 K/UL — SIGNIFICANT CHANGE UP (ref 3.8–10.5)
WBC # FLD AUTO: 6.64 K/UL — SIGNIFICANT CHANGE UP (ref 3.8–10.5)

## 2019-10-28 PROCEDURE — 99233 SBSQ HOSP IP/OBS HIGH 50: CPT

## 2019-10-28 PROCEDURE — 76942 ECHO GUIDE FOR BIOPSY: CPT | Mod: 26,59

## 2019-10-28 PROCEDURE — 99232 SBSQ HOSP IP/OBS MODERATE 35: CPT

## 2019-10-28 PROCEDURE — 76937 US GUIDE VASCULAR ACCESS: CPT | Mod: 26

## 2019-10-28 RX ORDER — PHYTONADIONE (VIT K1) 5 MG
10 TABLET ORAL DAILY
Refills: 0 | Status: COMPLETED | OUTPATIENT
Start: 2019-10-28 | End: 2019-10-29

## 2019-10-28 RX ADMIN — Medication 250 MILLIGRAM(S): at 05:57

## 2019-10-28 RX ADMIN — LIDOCAINE 2 PATCH: 4 CREAM TOPICAL at 11:20

## 2019-10-28 RX ADMIN — Medication 1 GRAM(S): at 05:57

## 2019-10-28 RX ADMIN — NYSTATIN CREAM 1 APPLICATION(S): 100000 CREAM TOPICAL at 18:34

## 2019-10-28 RX ADMIN — URSODIOL 300 MILLIGRAM(S): 250 TABLET, FILM COATED ORAL at 21:45

## 2019-10-28 RX ADMIN — ATORVASTATIN CALCIUM 20 MILLIGRAM(S): 80 TABLET, FILM COATED ORAL at 21:45

## 2019-10-28 RX ADMIN — Medication 1 GRAM(S): at 01:23

## 2019-10-28 RX ADMIN — NYSTATIN CREAM 1 APPLICATION(S): 100000 CREAM TOPICAL at 05:57

## 2019-10-28 RX ADMIN — LACTULOSE 30 GRAM(S): 10 SOLUTION ORAL at 06:06

## 2019-10-28 RX ADMIN — Medication 40 MILLIEQUIVALENT(S): at 11:22

## 2019-10-28 RX ADMIN — URSODIOL 300 MILLIGRAM(S): 250 TABLET, FILM COATED ORAL at 05:57

## 2019-10-28 RX ADMIN — Medication 40 MILLIGRAM(S): at 05:56

## 2019-10-28 RX ADMIN — LACTULOSE 30 GRAM(S): 10 SOLUTION ORAL at 13:38

## 2019-10-28 RX ADMIN — PANTOPRAZOLE SODIUM 40 MILLIGRAM(S): 20 TABLET, DELAYED RELEASE ORAL at 18:33

## 2019-10-28 RX ADMIN — URSODIOL 300 MILLIGRAM(S): 250 TABLET, FILM COATED ORAL at 13:38

## 2019-10-28 RX ADMIN — Medication 40 MILLIGRAM(S): at 21:52

## 2019-10-28 RX ADMIN — LACTULOSE 30 GRAM(S): 10 SOLUTION ORAL at 21:45

## 2019-10-28 RX ADMIN — PANTOPRAZOLE SODIUM 40 MILLIGRAM(S): 20 TABLET, DELAYED RELEASE ORAL at 06:05

## 2019-10-28 RX ADMIN — IRON SUCROSE 110 MILLIGRAM(S): 20 INJECTION, SOLUTION INTRAVENOUS at 21:46

## 2019-10-28 RX ADMIN — Medication 1 GRAM(S): at 11:23

## 2019-10-28 RX ADMIN — Medication 1 GRAM(S): at 18:34

## 2019-10-28 RX ADMIN — LIDOCAINE 2 PATCH: 4 CREAM TOPICAL at 23:42

## 2019-10-28 RX ADMIN — TAMSULOSIN HYDROCHLORIDE 0.4 MILLIGRAM(S): 0.4 CAPSULE ORAL at 21:45

## 2019-10-28 RX ADMIN — CEFTRIAXONE 100 MILLIGRAM(S): 500 INJECTION, POWDER, FOR SOLUTION INTRAMUSCULAR; INTRAVENOUS at 11:20

## 2019-10-28 RX ADMIN — LIDOCAINE 2 PATCH: 4 CREAM TOPICAL at 18:26

## 2019-10-28 RX ADMIN — Medication 250 MILLIGRAM(S): at 18:34

## 2019-10-28 RX ADMIN — Medication 40 MILLIGRAM(S): at 13:38

## 2019-10-28 RX ADMIN — Medication 1000 UNIT(S): at 11:22

## 2019-10-28 RX ADMIN — Medication 5 MILLIGRAM(S): at 11:22

## 2019-10-28 NOTE — PROGRESS NOTE ADULT - SUBJECTIVE AND OBJECTIVE BOX
[facility]  INFECTIOUS DISEASES AND INTERNAL MEDICINE at [facility]  =======================================================  Tavo Lyon MD  Diplomates American Board of Internal Medicine and Infectious Diseases  Tel: [phone]      Fax: [phone]  =======================================================      Follow up- Grp B strep bacteremia    No fever, no complaint, no abdominal pain.     Antibiotics:   cefTRIAXone   IVPB 2000 milliGRAM(s) IV Intermittent <User Schedule>    REVIEW OF SYSTEMS:  CONSTITUTIONAL:  No Fever. + chills  HEENT:  No diplopia or blurred vision.  No earache, sore throat or runny nose.  CARDIOVASCULAR:  No chest pain or SOB  RESPIRATORY:  No cough, shortness of breath  GASTROINTESTINAL:  No nausea, vomiting or diarrhea.  GENITOURINARY:  No dysuria, frequency or urgency.   MUSCULOSKELETAL:  no joint aches, no muscle pain  SKIN:  No change in skin, hair or nails.  NEUROLOGIC:  No Headaches, seizures   PSYCHIATRIC:  No disorder of thought or mood.  ENDOCRINE:  No heat or cold intolerance  HEMATOLOGICAL:  No easy bruising or bleeding.     Physical Exam:  Vital Signs Last 24 Hrs  T(C): 36.4 (27 Oct 2019 08:20), Max: 36.4 (27 Oct 2019 08:20)  T(F): 97.6 (27 Oct 2019 08:20), Max: 97.6 (27 Oct 2019 08:20)  HR: 80 (27 Oct 2019 08:20) (64 - 82)  BP: 91/40 (27 Oct 2019 08:20) (91/40 - 126/68)  RR: 18 (27 Oct 2019 08:20) (18 - 20)  SpO2: 91% (27 Oct 2019 10:45) (91% - 93%)  GEN: NAD, morbidly obese  HEENT: normocephalic and atraumatic. EOMI. PERRL.  Anicteric  NECK: Supple.   LUNGS: Clear to auscultation.  HEART: Regular rate and rhythm   ABDOMEN: Soft, nontender, and nondistended.  Positive bowel sounds.    : No CVA tenderness  EXTREMITIES: Without any edema.  MSK: No joint swelling  NEUROLOGIC: grossly intact   PSYCHIATRIC: Appropriate affect .  SKIN: No Rash    Labs:  10-27    133<L>  |  98  |  47.0<H>  ----------------------------<  136<H>  4.1   |  24.0  |  1.59<H>    Ca    8.7      27 Oct 2019 05:41  Phos  2.9     10-26  Mg     1.9     10-26    TPro  5.1<L>  /  Alb  2.6<L>  /  TBili  5.5<H>  /  DBili  x   /  AST  107<H>  /  ALT  48<H>  /  AlkPhos  66  10-27                        8.2    8.86  )-----------( 72       ( 27 Oct 2019 05:41 )             24.6     PT/INR - ( 27 Oct 2019 05:41 )   PT: 23.4 sec;   INR: 1.99 ratio      LIVER FUNCTIONS - ( 27 Oct 2019 05:41 )  Alb: 2.6 g/dL / Pro: 5.1 g/dL / ALK PHOS: 66 U/L / ALT: 48 U/L / AST: 107 U/L / GGT: x           RECENT CULTURES:  10-24 @ 11:46 .Blood     No growth at 48 hours    10-23 @ 18:14 .Blood     No growth at 48 hours    10-22 @ 21:39 .Urine     No growth    10-22 @ 19:18      NotDetec    10-22 @ 19:03 .Body Fluid     No growth at 4 days.  Culture in progress    Few White blood cells  No organisms seen    10-22 @ 17:55 .Blood Streptococcus agalactiae (Group B)    Growth in aerobic and anaerobic bottles: Streptococcus agalactiae (Group  B)  Aerobic Bottle: 09:17 Hours to positivity  Anaerobic Bottle: 09:27 Hours to positivity    from: CT Abdomen and Pelvis No Cont (10.23.19 @ 09:00)   EXAM:  CT ABDOMEN AND PELVIS                        PROCEDURE DATE:  10/23/2019    INTERPRETATION:  CLINICAL INFORMATION: Sepsis of unknown source. Elevated   bilirubin.  COMPARISON: CT scan of the abdomen and pelvis from 10/8/2019  PROCEDURE:   CT of the Abdomen and Pelvis was performed without intravenous contrast.   Intravenous contrast: None. This limits evaluation of vascular structures.  Oral contrast: None.  Sagittal and coronal reformats were performed.  FINDINGS:  LOWER CHEST: Small bilateral pleural effusions which were have increased   and bibasilar atelectasis.  LIVER: Slightly nodular in contour with a prominent left lobe raising   concern for cirrhosis. Please correlate clinically.  BILE DUCTS: Normal caliber.  GALLBLADDER: Status post cholecystectomy  SPLEEN: Mildly prominent measuring up to 13.3 cm. This may reflect portal   hypertension. Please correlate clinically.  PANCREAS: Within normal limits.  ADRENALS: Within normal limits.  KIDNEYS/URETERS: 4 mm nonobstructing stone in the lower pole of the left   kidney.  BLADDER: Collapsed and contains a Beasley catheter.  REPRODUCTIVE ORGANS: Grossly unremarkable.  BOWEL: No bowel obstruction. Appendix unremarkable.  PERITONEUM: Mild to moderate ascites is unchanged.  VESSELS: Varices. Large varices are seen to the left of the abdominal   aorta  RETROPERITONEUM/LYMPH NODES: No lymphadenopathy.    ABDOMINAL WALL: Subcutaneous edema.  BONES: Degenerative changes of bone.  IMPRESSION:   Findings raising concern for cirrhosis. Mildly prominent spleen and   varices may reflect portal hypertension. Please correlate clinically..   Ascites which has not significantly changed. Small bilateral pleural   effusions has increased.    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 Patient came in with sudden onset chills. In the ER patient was febrile to 102.6F, hypoglycemic, in sepsis, admitted to MICU. Was started on Vancomycin and Zosyn. Blood cultures with group B strep, strep is common with SBP but he doesn't have symptoms.     Group B Strep septicemia   ascites/fluid overload  non-alcoholic cirrhosis    - Blood culture strep group B on 10/22, source unclear could be mouth   - Repeat blood cultures on 10/23 and 10/24 negative   - WBC downtrending   - c/o right hip pain- X ray right hip unremarkable  - TTE negative for vegetations  - Most likely 2weeks treatment from first neg blood culture   - CT A/P with cirrhosis. Prior Hepatitis panel negative  - Continue Ceftriaxone 2 g IV q 24 hours-would treat through 11/05  - Midline ordered  - GI w/u in progress  - Trend Fever  - Trend Leukocytosis    Will Follow

## 2019-10-28 NOTE — PROGRESS NOTE ADULT - SUBJECTIVE AND OBJECTIVE BOX
NEPHROLOGY INTERVAL HPI/OVERNIGHT EVENTS:  pt c/o nausea when seen earlier  no cp, sob, n/v/    MEDICATIONS  (STANDING):  atorvastatin 20 milliGRAM(s) Oral at bedtime  cefTRIAXone   IVPB 2000 milliGRAM(s) IV Intermittent <User Schedule>  cholecalciferol 1000 Unit(s) Oral daily  dextrose 5%. 1000 milliLiter(s) (50 mL/Hr) IV Continuous <Continuous>  dextrose 50% Injectable 12.5 Gram(s) IV Push once  dextrose 50% Injectable 25 Gram(s) IV Push once  dextrose 50% Injectable 25 Gram(s) IV Push once  furosemide   Injectable 40 milliGRAM(s) IV Push three times a day  influenza   Vaccine 0.5 milliLiter(s) IntraMuscular once  iron sucrose IVPB 200 milliGRAM(s) IV Intermittent every 24 hours  lactulose Syrup 30 Gram(s) Oral three times a day  lidocaine   Patch 2 Patch Transdermal daily  nystatin Ointment 1 Application(s) Topical two times a day  pantoprazole  Injectable 40 milliGRAM(s) IV Push two times a day  phytonadione   Solution 5 milliGRAM(s) Oral daily  potassium chloride    Tablet ER 40 milliEquivalent(s) Oral daily  rifAXIMin 550 milliGRAM(s) Oral two times a day  saccharomyces boulardii 250 milliGRAM(s) Oral two times a day  sucralfate 1 Gram(s) Oral every 6 hours  tamsulosin 0.4 milliGRAM(s) Oral at bedtime  ursodiol Capsule 300 milliGRAM(s) Oral three times a day    MEDICATIONS  (PRN):  dextrose 40% Gel 15 Gram(s) Oral once PRN Blood Glucose LESS THAN 70 milliGRAM(s)/deciliter  glucagon  Injectable 1 milliGRAM(s) IntraMuscular once PRN Glucose LESS THAN 70 milligrams/deciliter  ondansetron Injectable 4 milliGRAM(s) IV Push every 6 hours PRN Nausea and/or Vomiting      Allergies    codeine (Anaphylaxis)    Intolerances    NO  RED MEAT (Unknown)        Vital Signs Last 24 Hrs  T(C): 36.6 (28 Oct 2019 08:20), Max: 36.7 (27 Oct 2019 16:00)  T(F): 97.8 (28 Oct 2019 08:20), Max: 98 (27 Oct 2019 16:00)  HR: 78 (28 Oct 2019 08:20) (78 - 80)  BP: 116/64 (28 Oct 2019 08:20) (106/61 - 139/69)  BP(mean): --  RR: 18 (28 Oct 2019 08:20) (18 - 18)  SpO2: 92% (28 Oct 2019 08:20) (92% - 95%)    PHYSICAL EXAM:  GENERAL: Obese; chronically debilitated  EYES:  conjunctiva and sclera clear  NECK: Supple, No JVD/Carotid Bruit  NERVOUS SYSTEM:  A/O x3, no asterixis  Lungs: Diminished BS at bases   HEART:  No murmur,  No rub  ABDOMEN: Soft, NT/ND BS+ Ascites ++  EXTREMITIES: + pitting edema/anasarca   : scrotal edema    LABS:                        8.5    6.64  )-----------( 75       ( 28 Oct 2019 07:40 )             25.8     10-    133<L>  |  98  |  44.0<H>  ----------------------------<  140<H>  3.8   |  24.0  |  1.17    Creatinine, Serum: 1.59 mg/dL (10.27.19 @ 05:41)        Ca    8.6      28 Oct 2019 07:40    TPro  5.0<L>  /  Alb  2.5<L>  /  TBili  4.9<H>  /  DBili  x   /  AST  92<H>  /  ALT  45<H>  /  AlkPhos  74  10-    PT/INR - ( 28 Oct 2019 07:40 )   PT: 25.1 sec;   INR: 2.13 ratio       Sodium, Random Urine: <30: Reference Ranges have NOT been established for random urine analytes due  to variability in fluid intake and concentration. mmol/L (10.24.19 @ 18:27)              RADIOLOGY & ADDITIONAL TESTS:  < from: US Renal (10.26.19 @ 09:42) >     EXAM:  US KIDNEY(S)                          PROCEDURE DATE:  10/26/2019          INTERPRETATION:  CLINICAL INFORMATION: Acute kidney insufficiency.   Evaluate for obstruction. No additional information is provided.    COMPARISON: CT scan of the abdomen and pelvis dated 10/23/2019 was    TECHNIQUE: Sonography of the kidneys and bladder.     FINDINGS:    Right kidney:  13.9 cm. No renal mass, hydronephrosis or calculi.    Left kidney:  12.8 cm. No renal calculus or gross hydronephrosis is   appreciated. Of note, the lower pole region is partially obscures by   overlying bowel gas limiting evaluation.    Urinary bladder: Was not imaged.    Ascites is noted.    IMPRESSION:     No renal calculus or gross hydronephrosis is appreciated bilaterally.   Abdominal ascites noted.    < end of copied text >      < from: TTE Echo Complete w/Doppler (10..19 @ 09:11) >    EXAM:  ECHO TRANSTHORACIC COMP W DOPP      PROCEDURE DATE:  Oct 25 2019   .      INTERPRETATION:  REPORT:    TRANSTHORACIC ECHOCARDIOGRAM REPORT         Patient Name:   JESSICA MARTIN Patient Location: Inpatient  Medical Rec #:  [id number]     Accession #:      [id number]  Account #:                   Height:           71.7 in 182.0 cm  YOB: 1955    Weight:           361.6 lb 164.00 kg  Patient Age:    63 years     BSA:              2.73 m²  Patient Gender: M            BP:   112/65 mmHg       Date of Exam:        10/25/2019 9:11:20 AM  Sonographer:         Carole Isidro  Referring Physician: Candida Suresh MD    Procedure:     2D Echo/Doppler/Color Doppler Complete and Echocardiogram   with                 Definity Contrast.  Indications:   Cardiac murmur, unspecified - R01.1  Diagnosis:     Cardiac murmur, unspecified - R01.1  Study Details: Technically difficult study. Study quality was adversely   affected                 due to body habitus and patient obesity.         2D AND M-MODE MEASUREMENTS (normal ranges within parentheses):  Left                 Normal   Aorta/Left            Normal  Ventricle:                    Atrium:  IVSd (2D):    1.07  (0.7-1.1) Aortic Root  3.30 cm (2.4-3.7)                 cm             (2D):  LVPWd (2D):   1.02  (0.7-1.1) Left Atrium  4.13 cm (1.9-4.0)                 cm             (2D):  LVIDd (2D):   5.06  (3.4-5.7) LA Volume     32.0                 cm             Index         ml/m²  LVIDs (2D):   3.03Right Ventricle:                 cm             TAPSE:           2.48 cm  LV FS (2D):   40.1   (>25%)                  %  Relative Wall 0.40   (<0.42)  Thickness    LV DIASTOLIC FUNCTION:  MV Peak E: 1.47 m/s E/e' Ratio: 17.10  MV Peak A: 1.12 m/s Decel Time: 217 msec  E/A Ratio: 1.31    SPECTRAL DOPPLER ANALYSIS (where applicable):  Mitral Valve:  MV P1/2 Time: 62.80 msec  MV Area, PHT: 3.50 cm²    Aortic Valve: AoV Max Philip: 1.61 m/s AoV Peak PG: 10.3 mmHg AoV Mean P.4 mmHg    LVOT Vmax: 1.25 m/s LVOT VTI: 0.282 m LVOT Diameter: 2.22 cm    AoV Area, Vmax: 3.00 cm² AoV Area, VTI: 3.17 cm² AoV Area, Vmn: 2.97 cm²  Ao VTI: 0.345  Tricuspid Valve and PA/RV Systolic Pressure: TR Max Velocity: 2.96 m/s RA   Pressure: 8 mmHg RVSP/PASP: 43.0 mmHg       PHYSICIAN INTERPRETATION:  Left Ventricle: Endocardial visualization was enhanced with intravenous   echo contrast. The left ventricular internal cavity size is normal. Left   ventricular wall thickness is normal.  Global LV systolic function was normal. Left ventricular ejection   fraction, by visual estimation, is 65 to 70%.  Right Ventricle: The right ventricular size is mildly enlarged. RV   systolic function is normal.  Left Atrium: The left atrium is normal in size.  Right Atrium: Normal right atrial size.  Pericardium: There is no evidence of pericardial effusion. Mild ascites   is noted.  Mitral Valve: The mitral valve is normal in structure. Mild thickening   and calcification of the anterior and posterior mitral valve leaflets.   Mitral leaflet mobility is normal. There is mild mitral annular   calcification. Trace mitral valve regurgitation is seen.  Tricuspid Valve: The tricuspid valve is normal in structure. Mild   tricuspid regurgitation is visualized. Estimated pulmonary artery   systolic pressure is 43.0 mmHg assuming a right atrial pressure of 8   mmHg, which is consistent with mild pulmonary hypertension.  Aortic Valve: The aortic valve is trileaflet. Sclerotic aortic valve with   decreased opening. Peak transaortic gradient equals 10.3 mmHg, mean   transaortic gradient equals 5.4 mmHg, the calculated aortic valve area   equals 3.17 cm² by the continuity equation consistent with normally   opening aortic valve. No evidence of aortic valve regurgitation is seen.  Pulmonic Valve: The pulmonic valve is normal. Trace pulmonic valve   regurgitation.  Aorta: The aortic root is normal in size and structure.  Pulmonary Artery: The pulmonary artery is of normal size and origin.  Venous: A normal flow pattern is recorded from the right upper pulmonary   vein. The inferior vena cava was normal sized, with respiratory size   variation less than 50%.  In comparison to the previous echocardiogram(s): There are no prior   studies on this patient for comparison purposes.       Summary:   1. Technically difficult and limited study due to body habitus.   2. Endocardial visualization was enhanced with intravenous echo contrast.   3. Normal global left ventricular systolic function.   4. Left ventricular ejection fraction, by visual estimation, is 65 to   70%.   5. The left atrium is normal in size.   6. Sclerotic aortic valve with decreased opening.   7. Mild thickening and calcification of the anterior and posterior   mitral valve leaflets.   8. Mild mitral annular calcification.   9. Mild to moderate ascites.  10. Estimated pulmonary artery systolic pressure is 43.0 mmHg assuming a   right atrial pressure of 8 mmHg, which is consistent with mild pulmonary   hypertension.    < end of copied text >

## 2019-10-28 NOTE — DIETITIAN INITIAL EVALUATION ADULT. - PERTINENT LABORATORY DATA
10-28 Na133 mmol/L<L> Glu 140 mg/dL<H> K+ 3.8 mmol/L Cr  1.17 mg/dL BUN 44.0 mg/dL<H> Phos n/a   Alb 2.5 g/dL<L> PAB n/a

## 2019-10-28 NOTE — DIETITIAN INITIAL EVALUATION ADULT. - ADD RECOMMEND
Provide Ensure TID, Rx MVI daily, Encourage po intake at every meal. Consider providing pt with appetite stimulant if poor po intake continues.  RD remain available for diet education as time permits.

## 2019-10-28 NOTE — PROGRESS NOTE ADULT - SUBJECTIVE AND OBJECTIVE BOX
INTERVAL HPI/OVERNIGHT EVENTS:FU for possible UGIB which seems to have stopped. Patient was scheduled for EGD, but cancelled due to elevated INR. Seen at bed side. Feels lethargic but not confused. Passing BM.    MEDICATIONS  (STANDING):  atorvastatin 20 milliGRAM(s) Oral at bedtime  cefTRIAXone   IVPB 2000 milliGRAM(s) IV Intermittent <User Schedule>  cholecalciferol 1000 Unit(s) Oral daily  dextrose 5%. 1000 milliLiter(s) (50 mL/Hr) IV Continuous <Continuous>  dextrose 50% Injectable 12.5 Gram(s) IV Push once  dextrose 50% Injectable 25 Gram(s) IV Push once  dextrose 50% Injectable 25 Gram(s) IV Push once  furosemide   Injectable 40 milliGRAM(s) IV Push three times a day  influenza   Vaccine 0.5 milliLiter(s) IntraMuscular once  iron sucrose IVPB 200 milliGRAM(s) IV Intermittent every 24 hours  lactulose Syrup 30 Gram(s) Oral three times a day  lidocaine   Patch 2 Patch Transdermal daily  nystatin Ointment 1 Application(s) Topical two times a day  pantoprazole  Injectable 40 milliGRAM(s) IV Push two times a day  phytonadione   Solution 10 milliGRAM(s) Oral daily  potassium chloride    Tablet ER 40 milliEquivalent(s) Oral daily  rifAXIMin 550 milliGRAM(s) Oral two times a day  saccharomyces boulardii 250 milliGRAM(s) Oral two times a day  sucralfate 1 Gram(s) Oral every 6 hours  tamsulosin 0.4 milliGRAM(s) Oral at bedtime  ursodiol Capsule 300 milliGRAM(s) Oral three times a day    MEDICATIONS  (PRN):  dextrose 40% Gel 15 Gram(s) Oral once PRN Blood Glucose LESS THAN 70 milliGRAM(s)/deciliter  glucagon  Injectable 1 milliGRAM(s) IntraMuscular once PRN Glucose LESS THAN 70 milligrams/deciliter  ondansetron Injectable 4 milliGRAM(s) IV Push every 6 hours PRN Nausea and/or Vomiting      Allergies    codeine (Anaphylaxis)    Intolerances    NO  RED MEAT (Unknown)      Vital Signs Last 24 Hrs  T(C): 36.6 (28 Oct 2019 08:20), Max: 36.7 (27 Oct 2019 16:00)  T(F): 97.8 (28 Oct 2019 08:20), Max: 98 (27 Oct 2019 16:00)  HR: 78 (28 Oct 2019 08:20) (78 - 80)  BP: 116/64 (28 Oct 2019 08:20) (106/61 - 139/69)  BP(mean): --  RR: 18 (28 Oct 2019 08:20) (18 - 18)  SpO2: 92% (28 Oct 2019 08:20) (92% - 95%)    LABS:                        8.5    6.64  )-----------( 75       ( 28 Oct 2019 07:40 )             25.8     10-28    133<L>  |  98  |  44.0<H>  ----------------------------<  140<H>  3.8   |  24.0  |  1.17    Ca    8.6      28 Oct 2019 07:40    TPro  5.0<L>  /  Alb  2.5<L>  /  TBili  4.9<H>  /  DBili  x   /  AST  92<H>  /  ALT  45<H>  /  AlkPhos  74  10-28    PT/INR - ( 28 Oct 2019 07:40 )   PT: 25.1 sec;   INR: 2.13 ratio               RADIOLOGY & ADDITIONAL TESTS:

## 2019-10-28 NOTE — DIETITIAN INITIAL EVALUATION ADULT. - MALNUTRITION
NFPE: mild muscle wasting in temples, mild fat loss in buccals and orbitals severe chronic Moderate-chronic NFPE: mild muscle wasting in temples, mild fat loss in buccals and orbitals moderate chronic

## 2019-10-28 NOTE — PROGRESS NOTE ADULT - SUBJECTIVE AND OBJECTIVE BOX
CC: Follow up    INTERVAL HPI/OVERNIGHT EVENTS: Patient seen and examined, denies sob, chest pain or palpitations. No BM since yesterday. Still has significant edema       Vital Signs Last 24 Hrs  T(C): 36.6 (28 Oct 2019 08:20), Max: 36.7 (27 Oct 2019 16:00)  T(F): 97.8 (28 Oct 2019 08:20), Max: 98 (27 Oct 2019 16:00)  HR: 78 (28 Oct 2019 08:20) (78 - 80)  BP: 116/64 (28 Oct 2019 08:20) (106/61 - 139/69)  BP(mean): --  RR: 18 (28 Oct 2019 08:20) (18 - 18)  SpO2: 92% (28 Oct 2019 08:20) (92% - 95%)    PHYSICAL EXAM:    GENERAL: NAD, AOX3 obese  HEAD:  Atraumatic, Normocephalicr  ENMT: Moist mucous membranes  CHEST/LUNG: Clear to auscultation bilaterally; No rales, rhonchi, wheezing, or rubs  HEART: Regular rate and rhythm; No murmurs, rubs, or gallops  ABDOMEN: Soft, Nontender, +Distended; Bowel sounds present  Scrotal edema   EXTREMITIES:  2+ Peripheral Pulses, No clubbing, cyanosis  2+ pedal edema bilaterally         MEDICATIONS  (STANDING):  atorvastatin 20 milliGRAM(s) Oral at bedtime  cefTRIAXone   IVPB 2000 milliGRAM(s) IV Intermittent <User Schedule>  cholecalciferol 1000 Unit(s) Oral daily  dextrose 5%. 1000 milliLiter(s) (50 mL/Hr) IV Continuous <Continuous>  dextrose 50% Injectable 12.5 Gram(s) IV Push once  dextrose 50% Injectable 25 Gram(s) IV Push once  dextrose 50% Injectable 25 Gram(s) IV Push once  furosemide   Injectable 40 milliGRAM(s) IV Push three times a day  influenza   Vaccine 0.5 milliLiter(s) IntraMuscular once  iron sucrose IVPB 200 milliGRAM(s) IV Intermittent every 24 hours  lactulose Syrup 30 Gram(s) Oral three times a day  lidocaine   Patch 2 Patch Transdermal daily  nystatin Ointment 1 Application(s) Topical two times a day  pantoprazole  Injectable 40 milliGRAM(s) IV Push two times a day  potassium chloride    Tablet ER 40 milliEquivalent(s) Oral daily  rifAXIMin 550 milliGRAM(s) Oral two times a day  saccharomyces boulardii 250 milliGRAM(s) Oral two times a day  sucralfate 1 Gram(s) Oral every 6 hours  tamsulosin 0.4 milliGRAM(s) Oral at bedtime  ursodiol Capsule 300 milliGRAM(s) Oral three times a day    MEDICATIONS  (PRN):  dextrose 40% Gel 15 Gram(s) Oral once PRN Blood Glucose LESS THAN 70 milliGRAM(s)/deciliter  glucagon  Injectable 1 milliGRAM(s) IntraMuscular once PRN Glucose LESS THAN 70 milligrams/deciliter  ondansetron Injectable 4 milliGRAM(s) IV Push every 6 hours PRN Nausea and/or Vomiting      Allergies    codeine (Anaphylaxis)    Intolerances    NO  RED MEAT (Unknown)        LABS:                          8.5    6.64  )-----------( 75       ( 28 Oct 2019 07:40 )             25.8     10-28    133<L>  |  98  |  44.0<H>  ----------------------------<  140<H>  3.8   |  24.0  |  1.17    Ca    8.6      28 Oct 2019 07:40    TPro  5.0<L>  /  Alb  2.5<L>  /  TBili  4.9<H>  /  DBili  x   /  AST  92<H>  /  ALT  45<H>  /  AlkPhos  74  10-28    PT/INR - ( 28 Oct 2019 07:40 )   PT: 25.1 sec;   INR: 2.13 ratio               RADIOLOGY & ADDITIONAL TESTS:

## 2019-10-28 NOTE — DIETITIAN INITIAL EVALUATION ADULT. - OTHER INFO
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 Patient came in with sudden onset chills. In the ER patient was febrile to 102.6F, hypoglycemic, in sepsis. Spoke with pt at beside, pt states  lbs, recent 60 lbs unintentional wt gain 2/2 fluid overload. As per EMR pt noted with 2+ generalized edema, 3+ scrotum edema, 4+ R/L leg edema. Pt states he does not follow any particular diet, report persistent lack of appetite and poor po intake x2 months 2/2 not feeling well. Consider providing pt with appetite stimulant if poor po intake continues. Fluid retention could be masking wt loss. As per EMR last BM noted 10/25, start bowel regiment if constipation continues. Limited NFPE conducted pt noted with mild muscle wasting and fat loss. Education not appropriate at this time, RD will follow up as time permits to provide diet ed.

## 2019-10-28 NOTE — DIETITIAN INITIAL EVALUATION ADULT. - ETIOLOGY
related to inability to meet sufficient protein-calorie needs 2/2 persistent lack of appetite in setting of Cirrhosis, GERD, GIB

## 2019-10-28 NOTE — PHYSICAL THERAPY INITIAL EVALUATION ADULT - CRITERIA FOR SKILLED THERAPEUTIC INTERVENTIONS
rehab potential/therapy frequency/functional limitations in following categories/impairments found/anticipated discharge recommendation/predicted duration of therapy intervention

## 2019-10-28 NOTE — PROGRESS NOTE ADULT - ASSESSMENT
Patient with cirrhosis, anemia. Will give oral vitamin K and repeat INR tomorrow. EGD postponed till tomorrow. NPO after midnight.  GI will follow
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
## 2019-10-28 NOTE — PROVIDER CONTACT NOTE (OTHER) - ACTION/TREATMENT ORDERED:
do not straight cath or reinsert bob now. continue to monitor urine output and bladder scan again in 4 hours. contact provider upon bladder scan if necessary.

## 2019-10-28 NOTE — PHYSICAL THERAPY INITIAL EVALUATION ADULT - RANGE OF MOTION EXAMINATION, REHAB EVAL
no ROM deficits were identified/except adriana hips lacks 65% AROM, adriana knees lacks 25% AROM, adriana ankles lacks 20% AROM

## 2019-10-28 NOTE — PHYSICAL THERAPY INITIAL EVALUATION ADULT - MANUAL MUSCLE TESTING RESULTS, REHAB EVAL
except adriana hips 2+/5, adriana knees 2/5, adriana ankles 2+/5/no strength deficits were identified

## 2019-10-28 NOTE — PROCEDURE NOTE - NSICDXPROCEDURE_GEN_ALL_CORE_FT
PROCEDURES:  US guided needle placement 28-Oct-2019 15:28:53 Arnoldo Jones  US guided vascular access 28-Oct-2019 15:27:49 Patent right brachial vein Arnoldo Coronado  Midline catheter insertion 28-Oct-2019 15:26:15 4FR 15CM 32CIRC BARD POWER MIDLINE ns flush good heme back right brachial vein Arnoldo Coronado

## 2019-10-28 NOTE — ED ADULT TRIAGE NOTE - DIRECT TO ROOM CARE INITIATED:
Subjective   Lesli Wen is a 36 y.o. female.   CC:  Followup ultrasound for pelvic pain, irregular cycles    History of Present Illness   Patient presents today for follow-up regarding pelvic pain and a regular cycles.  Patient was previously seen in August for this issue and was noted to have uterine tenderness on exam.  Patient was given a course of doxycycline and she states that she completed the antibiotic.  She does report that her menstrual cycles have been regular since that visit.  She does complain of continued pelvic pain and reports that more of a constant, dull pain.  She denies any pain with intercourse or increase in pain with her menstrual cycles.  She reports the pain is more when she is sitting still.  She denies any problems with constipation.    The following portions of the patient's history were reviewed and updated as appropriate: allergies, current medications, past family history, past medical history, past social history, past surgical history and problem list.    Review of Systems   Genitourinary: Positive for pelvic pain.   All other systems reviewed and are negative.      Objective   Physical Exam   Constitutional: She appears well-developed and well-nourished.   Cardiovascular: Normal rate and regular rhythm.   Pulmonary/Chest: Effort normal and breath sounds normal.   Abdominal: Soft. She exhibits no distension. There is no tenderness.   Neurological: She is alert.   Psychiatric: She has a normal mood and affect.   Vitals reviewed.        Assessment/Plan   Lesli was seen today for gynecologic exam.    Diagnoses and all orders for this visit:    Fluid in endometrial cavity  -     Case Request    Irregular menstrual bleeding  -     POC Pregnancy, Urine    Preoperative exam for gynecologic surgery  -     CBC (No Diff); Future  -     Pregnancy, Urine - Urine, Clean Catch; Future    Other orders  -     Follow Anesthesia Guidelines / Standing Orders; Future  -     Obtain Informed  Consent; Future  -     Provide NPO Instructions to Patient; Future  -     Chlorhexidine Skin Prep; Future  -     Follow Anesthesia Guidelines / Standing Orders; Standing  -     Verify NPO Status; Standing  -     Obtain Informed Consent; Standing  -     SCD (Sequential Compression Device) - Place on Patient in Pre-Op; Standing  -     Verify / Perform Chlorhexidine Skin Prep; Standing  -     Verify / Perform Chlorhexidine Skin Prep if Indicated (If Not Already Completed); Standing    Ultrasound today shows a small amount of fluid within the endometrial cavity.  Discussed with patient that since she is already completed a course of antibiotics, we can either observe and repeat ultrasound in approximately 1 month or proceed with hysteroscopy and D&C in the operating room to remove the fluid collection.  Discussed with patient risks of the procedure including risk for infection, bleeding, uterine perforation with damage to surrounding structures, risk that this may not improve her pain, risk of anesthesia, blood clots, heart attack, and stroke.  Patient understands these risks and agrees to proceed with hysteroscopy and D&C.  Discussed with patient postoperative expectations and recovery.                30-Nov-2018 16:05

## 2019-10-28 NOTE — PROGRESS NOTE ADULT - ASSESSMENT
MISA:  Concepcion+<30 c/w pre renal etiology  Hepatic cirrhosis  Doubt HRS as renal function improved; perhaps a degree of ATN from hypotension/sepsis  Anasarca  - cont Lasix for now and avoid overdiuresis  - await 24 hr urine protein  - if fails medical management and remains significantly edematous will need to consider RRT for ultrafiltration

## 2019-10-28 NOTE — PROCEDURE NOTE - NSPROCDETAILS_GEN_ALL_CORE
location identified, draped/prepped, sterile technique used/ultrasound guidance/supine position/ultrasound assessment/sterile technique, catheter placed/sterile dressing applied

## 2019-10-28 NOTE — PHYSICAL THERAPY INITIAL EVALUATION ADULT - PERTINENT HX OF CURRENT PROBLEM, REHAB EVAL
pt presents to [facility] due to chills, fever, hypoglycemia, ascites, septicemia, anemia

## 2019-10-28 NOTE — PHYSICAL THERAPY INITIAL EVALUATION ADULT - GENERAL OBSERVATIONS, REHAB EVAL
pt received semi orozco position in bed, O2 line, bob, agreeable to PT, RN discussion: agreeable to PT

## 2019-10-28 NOTE — PROGRESS NOTE ADULT - ASSESSMENT
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 He was found to be hypoglycemic ~29 and had a TMax ~102.6. He was fluid resuscitated, given Abx Zosyn/ Vanco and also one amp D50%, repeat FS ~ 69 for which he received another amp D50. Lactate was 4.1 ICU consulted for fever, persistent hypoglycemia and hypotension. Patient takes Lantus 40U and Tradjenta 5mg at home which he was compliant with; but he has not been eating well. In the ED, he had a diagnostic paracentesis ~ 20cc bloody fluid was drained. Fluid cultures were negative. Found to have gram positive bacteremia and sepsis.  Downgraded to the hospitalist service for further management. CT abdomen and pelvis consistent with hepatomegaly and cirrhosis. GI was consulted; Hepatitis panel negative.     Assessment/Plan:    1. Group B bacteremia: Source unclear  Repeat blood cultures on 10/23 were negative  IV rocephin until11/6/19    2. Cirrhosis likely secondary to NAFLD: IV lasix for diuresis  ON lactulose TID  Elevated INR; PO vitamin K ordered    3. MISA secondary to volume overload  Renal ultrasound was normal    4. Anemia: suspected secondary to GI bleed  No further episodes of bleeding  Monitor hb/hct   EGD held until INR  <2    5. Acute urinary retention:Dc bob for a trial of void  On flomax       6. Thrombocytopenia likely secondary to cirrhosis     DVT PPE SCDs- thrombocytopenia and bleeding risk     PT; Stephanie on discharge

## 2019-10-29 LAB
ANION GAP SERPL CALC-SCNC: 11 MMOL/L — SIGNIFICANT CHANGE UP (ref 5–17)
BUN SERPL-MCNC: 38 MG/DL — HIGH (ref 8–20)
CALCIUM SERPL-MCNC: 8.7 MG/DL — SIGNIFICANT CHANGE UP (ref 8.6–10.2)
CHLORIDE SERPL-SCNC: 98 MMOL/L — SIGNIFICANT CHANGE UP (ref 98–107)
CO2 SERPL-SCNC: 27 MMOL/L — SIGNIFICANT CHANGE UP (ref 22–29)
CREAT SERPL-MCNC: 1.11 MG/DL — SIGNIFICANT CHANGE UP (ref 0.5–1.3)
CULTURE RESULTS: SIGNIFICANT CHANGE UP
CULTURE RESULTS: SIGNIFICANT CHANGE UP
GLUCOSE BLDC GLUCOMTR-MCNC: 131 MG/DL — HIGH (ref 70–99)
GLUCOSE BLDC GLUCOMTR-MCNC: 162 MG/DL — HIGH (ref 70–99)
GLUCOSE BLDC GLUCOMTR-MCNC: 177 MG/DL — HIGH (ref 70–99)
GLUCOSE SERPL-MCNC: 148 MG/DL — HIGH (ref 70–115)
HCT VFR BLD CALC: 26.7 % — LOW (ref 39–50)
HGB BLD-MCNC: 8.7 G/DL — LOW (ref 13–17)
INR BLD: 2.17 RATIO — HIGH (ref 0.88–1.16)
MCHC RBC-ENTMCNC: 32.3 PG — SIGNIFICANT CHANGE UP (ref 27–34)
MCHC RBC-ENTMCNC: 32.6 GM/DL — SIGNIFICANT CHANGE UP (ref 32–36)
MCV RBC AUTO: 99.3 FL — SIGNIFICANT CHANGE UP (ref 80–100)
PLATELET # BLD AUTO: 72 K/UL — LOW (ref 150–400)
POTASSIUM SERPL-MCNC: 4 MMOL/L — SIGNIFICANT CHANGE UP (ref 3.5–5.3)
POTASSIUM SERPL-SCNC: 4 MMOL/L — SIGNIFICANT CHANGE UP (ref 3.5–5.3)
PROTHROM AB SERPL-ACNC: 25.5 SEC — HIGH (ref 10–12.9)
RBC # BLD: 2.69 M/UL — LOW (ref 4.2–5.8)
RBC # FLD: 17.8 % — HIGH (ref 10.3–14.5)
SODIUM SERPL-SCNC: 136 MMOL/L — SIGNIFICANT CHANGE UP (ref 135–145)
SPECIMEN SOURCE: SIGNIFICANT CHANGE UP
SPECIMEN SOURCE: SIGNIFICANT CHANGE UP
WBC # BLD: 6.92 K/UL — SIGNIFICANT CHANGE UP (ref 3.8–10.5)
WBC # FLD AUTO: 6.92 K/UL — SIGNIFICANT CHANGE UP (ref 3.8–10.5)

## 2019-10-29 PROCEDURE — 99233 SBSQ HOSP IP/OBS HIGH 50: CPT

## 2019-10-29 PROCEDURE — 99232 SBSQ HOSP IP/OBS MODERATE 35: CPT

## 2019-10-29 RX ORDER — ALBUMIN HUMAN 25 %
100 VIAL (ML) INTRAVENOUS EVERY 12 HOURS
Refills: 0 | Status: DISCONTINUED | OUTPATIENT
Start: 2019-10-29 | End: 2019-10-30

## 2019-10-29 RX ADMIN — Medication 1 GRAM(S): at 18:20

## 2019-10-29 RX ADMIN — LACTULOSE 30 GRAM(S): 10 SOLUTION ORAL at 15:53

## 2019-10-29 RX ADMIN — Medication 1 GRAM(S): at 22:53

## 2019-10-29 RX ADMIN — Medication 40 MILLIGRAM(S): at 21:03

## 2019-10-29 RX ADMIN — Medication 1 GRAM(S): at 00:14

## 2019-10-29 RX ADMIN — Medication 40 MILLIEQUIVALENT(S): at 12:53

## 2019-10-29 RX ADMIN — CEFTRIAXONE 100 MILLIGRAM(S): 500 INJECTION, POWDER, FOR SOLUTION INTRAMUSCULAR; INTRAVENOUS at 12:52

## 2019-10-29 RX ADMIN — PANTOPRAZOLE SODIUM 40 MILLIGRAM(S): 20 TABLET, DELAYED RELEASE ORAL at 18:20

## 2019-10-29 RX ADMIN — Medication 1 GRAM(S): at 12:53

## 2019-10-29 RX ADMIN — TAMSULOSIN HYDROCHLORIDE 0.4 MILLIGRAM(S): 0.4 CAPSULE ORAL at 21:03

## 2019-10-29 RX ADMIN — Medication 40 MILLIGRAM(S): at 06:20

## 2019-10-29 RX ADMIN — Medication 50 MILLILITER(S): at 18:20

## 2019-10-29 RX ADMIN — Medication 250 MILLIGRAM(S): at 18:20

## 2019-10-29 RX ADMIN — URSODIOL 300 MILLIGRAM(S): 250 TABLET, FILM COATED ORAL at 18:20

## 2019-10-29 RX ADMIN — LACTULOSE 30 GRAM(S): 10 SOLUTION ORAL at 21:03

## 2019-10-29 RX ADMIN — Medication 40 MILLIGRAM(S): at 15:53

## 2019-10-29 RX ADMIN — NYSTATIN CREAM 1 APPLICATION(S): 100000 CREAM TOPICAL at 06:20

## 2019-10-29 RX ADMIN — Medication 10 MILLIGRAM(S): at 12:53

## 2019-10-29 RX ADMIN — PANTOPRAZOLE SODIUM 40 MILLIGRAM(S): 20 TABLET, DELAYED RELEASE ORAL at 06:21

## 2019-10-29 RX ADMIN — URSODIOL 300 MILLIGRAM(S): 250 TABLET, FILM COATED ORAL at 21:03

## 2019-10-29 RX ADMIN — Medication 1000 UNIT(S): at 12:53

## 2019-10-29 RX ADMIN — ATORVASTATIN CALCIUM 20 MILLIGRAM(S): 80 TABLET, FILM COATED ORAL at 21:03

## 2019-10-29 RX ADMIN — NYSTATIN CREAM 1 APPLICATION(S): 100000 CREAM TOPICAL at 18:20

## 2019-10-29 NOTE — PROGRESS NOTE ADULT - SUBJECTIVE AND OBJECTIVE BOX
[facility]  INFECTIOUS DISEASES AND INTERNAL MEDICINE at [place]  =======================================================  Tavo Lyon MD  Diplomates American Board of Internal Medicine and Infectious Diseases  Tel: [phone]      Fax: [phone]  =======================================================      Follow up- Grp B strep bacteremia    No fever, no complaint, no abdominal pain.   midline placed 10/28    Antibiotics:   cefTRIAXone   IVPB 2000 milliGRAM(s) IV Intermittent <User Schedule>    REVIEW OF SYSTEMS:  CONSTITUTIONAL:  No Fever. + chills  HEENT:  No diplopia or blurred vision.  No earache, sore throat or runny nose.  CARDIOVASCULAR:  No chest pain or SOB  RESPIRATORY:  No cough, shortness of breath  GASTROINTESTINAL:  No nausea, vomiting or diarrhea.  GENITOURINARY:  No dysuria, frequency or urgency.   MUSCULOSKELETAL:  no joint aches, no muscle pain  SKIN:  No change in skin, hair or nails.  NEUROLOGIC:  No Headaches, seizures   PSYCHIATRIC:  No disorder of thought or mood.  ENDOCRINE:  No heat or cold intolerance  HEMATOLOGICAL:  No easy bruising or bleeding.     Physical Exam:  Vital Signs Last 24 Hrs  T(C): 36.4 (27 Oct 2019 08:20), Max: 36.4 (27 Oct 2019 08:20)  T(F): 97.6 (27 Oct 2019 08:20), Max: 97.6 (27 Oct 2019 08:20)  HR: 80 (27 Oct 2019 08:20) (64 - 82)  BP: 91/40 (27 Oct 2019 08:20) (91/40 - 126/68)  RR: 18 (27 Oct 2019 08:20) (18 - 20)  SpO2: 91% (27 Oct 2019 10:45) (91% - 93%)  GEN: NAD, morbidly obese  HEENT: normocephalic and atraumatic. EOMI. PERRL.  Anicteric  NECK: Supple.   LUNGS: Clear to auscultation.  HEART: Regular rate and rhythm   ABDOMEN: Soft, nontender, and nondistended.  Positive bowel sounds.    : No CVA tenderness  EXTREMITIES: Without any edema.  MSK: No joint swelling  NEUROLOGIC: grossly intact   PSYCHIATRIC: Appropriate affect .  SKIN: No Rash RUe midline intact    Labs:  10-27    133<L>  |  98  |  47.0<H>  ----------------------------<  136<H>  4.1   |  24.0  |  1.59<H>    Ca    8.7      27 Oct 2019 05:41  Phos  2.9     10-26  Mg     1.9     10-26    TPro  5.1<L>  /  Alb  2.6<L>  /  TBili  5.5<H>  /  DBili  x   /  AST  107<H>  /  ALT  48<H>  /  AlkPhos  66  10-27                        8.2    8.86  )-----------( 72       ( 27 Oct 2019 05:41 )             24.6     PT/INR - ( 27 Oct 2019 05:41 )   PT: 23.4 sec;   INR: 1.99 ratio      LIVER FUNCTIONS - ( 27 Oct 2019 05:41 )  Alb: 2.6 g/dL / Pro: 5.1 g/dL / ALK PHOS: 66 U/L / ALT: 48 U/L / AST: 107 U/L / GGT: x           RECENT CULTURES:  10-24 @ 11:46 .Blood     No growth at 48 hours    10-23 @ 18:14 .Blood     No growth at 48 hours    10-22 @ 21:39 .Urine     No growth    10-22 @ 19:18      NotDetec    10-22 @ 19:03 .Body Fluid     No growth at 4 days.  Culture in progress    Few White blood cells  No organisms seen    10-22 @ 17:55 .Blood Streptococcus agalactiae (Group B)    Growth in aerobic and anaerobic bottles: Streptococcus agalactiae (Group  B)  Aerobic Bottle: 09:17 Hours to positivity  Anaerobic Bottle: 09:27 Hours to positivity    from: CT Abdomen and Pelvis No Cont (10.23.19 @ 09:00)   EXAM:  CT ABDOMEN AND PELVIS                        PROCEDURE DATE:  10/23/2019    INTERPRETATION:  CLINICAL INFORMATION: Sepsis of unknown source. Elevated   bilirubin.  COMPARISON: CT scan of the abdomen and pelvis from 10/8/2019  PROCEDURE:   CT of the Abdomen and Pelvis was performed without intravenous contrast.   Intravenous contrast: None. This limits evaluation of vascular structures.  Oral contrast: None.  Sagittal and coronal reformats were performed.  FINDINGS:  LOWER CHEST: Small bilateral pleural effusions which were have increased   and bibasilar atelectasis.  LIVER: Slightly nodular in contour with a prominent left lobe raising   concern for cirrhosis. Please correlate clinically.  BILE DUCTS: Normal caliber.  GALLBLADDER: Status post cholecystectomy  SPLEEN: Mildly prominent measuring up to 13.3 cm. This may reflect portal   hypertension. Please correlate clinically.  PANCREAS: Within normal limits.  ADRENALS: Within normal limits.  KIDNEYS/URETERS: 4 mm nonobstructing stone in the lower pole of the left   kidney.  BLADDER: Collapsed and contains a Beasley catheter.  REPRODUCTIVE ORGANS: Grossly unremarkable.  BOWEL: No bowel obstruction. Appendix unremarkable.  PERITONEUM: Mild to moderate ascites is unchanged.  VESSELS: Varices. Large varices are seen to the left of the abdominal   aorta  RETROPERITONEUM/LYMPH NODES: No lymphadenopathy.    ABDOMINAL WALL: Subcutaneous edema.  BONES: Degenerative changes of bone.  IMPRESSION:   Findings raising concern for cirrhosis. Mildly prominent spleen and   varices may reflect portal hypertension. Please correlate clinically..   Ascites which has not significantly changed. Small bilateral pleural   effusions has increased.    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 Patient came in with sudden onset chills. In the ER patient was febrile to 102.6F, hypoglycemic, in sepsis, admitted to MICU. Was started on Vancomycin and Zosyn. Blood cultures with group B strep, strep is common with SBP but he doesn't have symptoms.     Group B Strep septicemia   ascites/fluid overload  non-alcoholic cirrhosis    - Blood culture strep group B on 10/22, source unclear could be mouth   - Repeat blood cultures on 10/23 and 10/24 negative   - WBC wnl  - c/o right hip pain- X ray right hip unremarkable  - TTE negative for vegetations  - CT A/P with cirrhosis. Prior Hepatitis panel negative  - Continue Ceftriaxone 2 g IV q 24 hours through 11/05  - Midline placed 10/28  - plan for SANTANA  - GI w/u postponed       Will sign off. Please call if questions arise  d/w Dr Espinoza [facility]  INFECTIOUS DISEASES AND INTERNAL MEDICINE at [place]  =======================================================  Tavo Lyon MD  Diplomates American Board of Internal Medicine and Infectious Diseases  Tel: [phone]      Fax: [phone]  =======================================================      Follow up- Grp B strep bacteremia    No fever, no complaint, no abdominal pain.   midline placed 10/28    Antibiotics:   cefTRIAXone   IVPB 2000 milliGRAM(s) IV Intermittent <User Schedule>    REVIEW OF SYSTEMS:  CONSTITUTIONAL:  No Fever. + chills  HEENT:  No diplopia or blurred vision.  No earache, sore throat or runny nose.  CARDIOVASCULAR:  No chest pain or SOB  RESPIRATORY:  No cough, shortness of breath  GASTROINTESTINAL:  No nausea, vomiting or diarrhea.  GENITOURINARY:  No dysuria, frequency or urgency.   MUSCULOSKELETAL:  no joint aches, no muscle pain  SKIN:  No change in skin, hair or nails.  NEUROLOGIC:  No Headaches, seizures   PSYCHIATRIC:  No disorder of thought or mood.  ENDOCRINE:  No heat or cold intolerance  HEMATOLOGICAL:  No easy bruising or bleeding.     Physical Exam:  Vital Signs Last 24 Hrs  T(C): 36.4 (27 Oct 2019 08:20), Max: 36.4 (27 Oct 2019 08:20)  T(F): 97.6 (27 Oct 2019 08:20), Max: 97.6 (27 Oct 2019 08:20)  HR: 80 (27 Oct 2019 08:20) (64 - 82)  BP: 91/40 (27 Oct 2019 08:20) (91/40 - 126/68)  RR: 18 (27 Oct 2019 08:20) (18 - 20)  SpO2: 91% (27 Oct 2019 10:45) (91% - 93%)  GEN: NAD, morbidly obese  HEENT: normocephalic and atraumatic. EOMI. PERRL.  Anicteric  NECK: Supple.   LUNGS: Clear to auscultation.  HEART: Regular rate and rhythm   ABDOMEN: Soft, nontender, and nondistended.  Positive bowel sounds.    : No CVA tenderness  EXTREMITIES: Without any edema.  MSK: No joint swelling  NEUROLOGIC: grossly intact   PSYCHIATRIC: Appropriate affect .  SKIN: No Rash RUe midline intact    Labs:  10-27    133<L>  |  98  |  47.0<H>  ----------------------------<  136<H>  4.1   |  24.0  |  1.59<H>    Ca    8.7      27 Oct 2019 05:41  Phos  2.9     10-26  Mg     1.9     10-26    TPro  5.1<L>  /  Alb  2.6<L>  /  TBili  5.5<H>  /  DBili  x   /  AST  107<H>  /  ALT  48<H>  /  AlkPhos  66  10-27                        8.2    8.86  )-----------( 72       ( 27 Oct 2019 05:41 )             24.6     PT/INR - ( 27 Oct 2019 05:41 )   PT: 23.4 sec;   INR: 1.99 ratio      LIVER FUNCTIONS - ( 27 Oct 2019 05:41 )  Alb: 2.6 g/dL / Pro: 5.1 g/dL / ALK PHOS: 66 U/L / ALT: 48 U/L / AST: 107 U/L / GGT: x           RECENT CULTURES:  10-24 @ 11:46 .Blood     No growth at 48 hours    10-23 @ 18:14 .Blood     No growth at 48 hours    10-22 @ 21:39 .Urine     No growth    10-22 @ 19:18      NotDetec    10-22 @ 19:03 .Body Fluid     No growth at 4 days.  Culture in progress    Few White blood cells  No organisms seen    10-22 @ 17:55 .Blood Streptococcus agalactiae (Group B)    Growth in aerobic and anaerobic bottles: Streptococcus agalactiae (Group  B)  Aerobic Bottle: 09:17 Hours to positivity  Anaerobic Bottle: 09:27 Hours to positivity    from: CT Abdomen and Pelvis No Cont (10.23.19 @ 09:00)   EXAM:  CT ABDOMEN AND PELVIS                        PROCEDURE DATE:  10/23/2019    INTERPRETATION:  CLINICAL INFORMATION: Sepsis of unknown source. Elevated   bilirubin.  COMPARISON: CT scan of the abdomen and pelvis from 10/8/2019  PROCEDURE:   CT of the Abdomen and Pelvis was performed without intravenous contrast.   Intravenous contrast: None. This limits evaluation of vascular structures.  Oral contrast: None.  Sagittal and coronal reformats were performed.  FINDINGS:  LOWER CHEST: Small bilateral pleural effusions which were have increased   and bibasilar atelectasis.  LIVER: Slightly nodular in contour with a prominent left lobe raising   concern for cirrhosis. Please correlate clinically.  BILE DUCTS: Normal caliber.  GALLBLADDER: Status post cholecystectomy  SPLEEN: Mildly prominent measuring up to 13.3 cm. This may reflect portal   hypertension. Please correlate clinically.  PANCREAS: Within normal limits.  ADRENALS: Within normal limits.  KIDNEYS/URETERS: 4 mm nonobstructing stone in the lower pole of the left   kidney.  BLADDER: Collapsed and contains a Beasley catheter.  REPRODUCTIVE ORGANS: Grossly unremarkable.  BOWEL: No bowel obstruction. Appendix unremarkable.  PERITONEUM: Mild to moderate ascites is unchanged.  VESSELS: Varices. Large varices are seen to the left of the abdominal   aorta  RETROPERITONEUM/LYMPH NODES: No lymphadenopathy.    ABDOMINAL WALL: Subcutaneous edema.  BONES: Degenerative changes of bone.  IMPRESSION:   Findings raising concern for cirrhosis. Mildly prominent spleen and   varices may reflect portal hypertension. Please correlate clinically..   Ascites which has not significantly changed. Small bilateral pleural   effusions has increased.    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 Patient came in with sudden onset chills. In the ER patient was febrile to 102.6F, hypoglycemic, in sepsis, admitted to MICU. Was started on Vancomycin and Zosyn. Blood cultures with group B strep, strep is common with SBP but he doesn't have symptoms.     Group B Strep septicemia   ascites/fluid overload  non-alcoholic cirrhosis    - Blood culture strep group B on 10/22, source unclear could be mouth   - Repeat blood cultures on 10/23 and 10/24 negative   - WBC wnl  - c/o right hip pain- X ray right hip unremarkable  - TTE negative for vegetations  - CT A/P with cirrhosis. Prior Hepatitis panel negative  - Continue Ceftriaxone 2 g IV q 24 hours through 11/05. f/u with me in office in 2 weeks  - Midline placed 10/28  - plan for SANTANA  - GI w/u postponed       Will sign off. Please call if questions arise  d/w Dr Espinoza

## 2019-10-29 NOTE — PROGRESS NOTE ADULT - SUBJECTIVE AND OBJECTIVE BOX
CC: Follow up     INTERVAL HPI/OVERNIGHT EVENTS: Patient seen and examined, tolerating diet no nausea or vomiting. Abdominal distension improved. PO intake improving. No episodes of bleeding. Hb/Hct stable. [place] was removed for a TOV yesterday, voiding well. Primafit in place      Vital Signs Last 24 Hrs  T(C): 36.7 (28 Oct 2019 21:32), Max: 36.7 (28 Oct 2019 21:32)  T(F): 98 (28 Oct 2019 21:32), Max: 98 (28 Oct 2019 21:32)  HR: 82 (29 Oct 2019 06:17) (78 - 82)  BP: 112/60 (29 Oct 2019 06:17) (106/63 - 117/70)  BP(mean): --  RR: 18 (29 Oct 2019 06:17) (18 - 18)  SpO2: 90% (29 Oct 2019 06:17) (90% - 90%)    PHYSICAL EXAM:    GENERAL: NAD, AOX3 obese   HEAD:  Atraumatic, Normocephalic  EYES: EOMI, PERRLA, conjunctiva and sclera clear  ENMT: Moist mucous membranes  CHEST/LUNG: Clear to auscultation bilaterally; No rales, rhonchi, wheezing, or rubs  HEART: Regular rate and rhythm; No murmurs, rubs, or gallops  ABDOMEN: Soft, Nontender, + DIstended; Bowel sounds present  + Scrotal edema   EXTREMITIES:  2+ Peripheral Pulses, No clubbing, cyanosis,  2+ pedal edema bilaterally          MEDICATIONS  (STANDING):  albumin human 25% IVPB 100 milliLiter(s) IV Intermittent every 12 hours  atorvastatin 20 milliGRAM(s) Oral at bedtime  cefTRIAXone   IVPB 2000 milliGRAM(s) IV Intermittent <User Schedule>  cholecalciferol 1000 Unit(s) Oral daily  dextrose 5%. 1000 milliLiter(s) (50 mL/Hr) IV Continuous <Continuous>  dextrose 50% Injectable 12.5 Gram(s) IV Push once  dextrose 50% Injectable 25 Gram(s) IV Push once  dextrose 50% Injectable 25 Gram(s) IV Push once  furosemide   Injectable 40 milliGRAM(s) IV Push three times a day  influenza   Vaccine 0.5 milliLiter(s) IntraMuscular once  lactulose Syrup 30 Gram(s) Oral three times a day  lidocaine   Patch 2 Patch Transdermal daily  nystatin Ointment 1 Application(s) Topical two times a day  pantoprazole  Injectable 40 milliGRAM(s) IV Push two times a day  phytonadione   Solution 10 milliGRAM(s) Oral daily  potassium chloride    Tablet ER 40 milliEquivalent(s) Oral daily  rifAXIMin 550 milliGRAM(s) Oral two times a day  saccharomyces boulardii 250 milliGRAM(s) Oral two times a day  sucralfate 1 Gram(s) Oral every 6 hours  tamsulosin 0.4 milliGRAM(s) Oral at bedtime  ursodiol Capsule 300 milliGRAM(s) Oral three times a day    MEDICATIONS  (PRN):  dextrose 40% Gel 15 Gram(s) Oral once PRN Blood Glucose LESS THAN 70 milliGRAM(s)/deciliter  glucagon  Injectable 1 milliGRAM(s) IntraMuscular once PRN Glucose LESS THAN 70 milligrams/deciliter  ondansetron Injectable 4 milliGRAM(s) IV Push every 6 hours PRN Nausea and/or Vomiting      Allergies    codeine (Anaphylaxis)    Intolerances    NO  RED MEAT (Unknown)        LABS:                          8.7    6.92  )-----------( 72       ( 29 Oct 2019 06:49 )             26.7     10-29    136  |  98  |  38.0<H>  ----------------------------<  148<H>  4.0   |  27.0  |  1.11    Ca    8.7      29 Oct 2019 06:49    TPro  5.0<L>  /  Alb  2.5<L>  /  TBili  4.9<H>  /  DBili  x   /  AST  92<H>  /  ALT  45<H>  /  AlkPhos  74  10-28    PT/INR - ( 29 Oct 2019 06:49 )   PT: 25.5 sec;   INR: 2.17 ratio               RADIOLOGY & ADDITIONAL TESTS:

## 2019-10-29 NOTE — PROGRESS NOTE ADULT - ASSESSMENT
Patient with cirrhosis. No more GI bleeding. No need for EGD.   Edema: Add albumin 50 g/day. Weigh daily. Monitor renal function  GI will follow up

## 2019-10-29 NOTE — PROGRESS NOTE ADULT - ASSESSMENT
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 He was found to be hypoglycemic ~29 and had a TMax ~102.6. He was fluid resuscitated, given Abx Zosyn/ Vanco and also one amp D50%, repeat FS ~ 69 for which he received another amp D50. Lactate was 4.1 ICU consulted for fever, persistent hypoglycemia and hypotension. Patient takes Lantus 40U and Tradjenta 5mg at home which he was compliant with; but he has not been eating well. In the ED, he had a diagnostic paracentesis ~ 20cc bloody fluid was drained. Fluid cultures were negative. Found to have gram positive bacteremia and sepsis.  Downgraded to the hospitalist service for further management. CT abdomen and pelvis consistent with hepatomegaly and cirrhosis. GI was consulted; Hepatitis panel negative.     Assessment/Plan:    1. Group B bacteremia: Source unclear  Repeat blood cultures on 10/23 were negative  IV rocephin until 11/6/19  LUE midline in place     2. Cirrhosis likely secondary to NAFLD: IV lasix for diuresis  ON lactulose TID    3. MISA secondary to volume overload: Improved with diuresis   Renal ultrasound was normal    4. Anemia: suspected secondary to GI bleed: HB/Hct stable with no further episodes of bleeding  Spoke with GI no need for EGD at this time unless clinical condition changes  Monitor hb/hct     5. Acute urinary retention: [place] was discontinued on 10/28  Voiding well  Primafit in place   On flomax     6. Thrombocytopenia likely secondary to cirrhosis     DVT PPE SCDs- thrombocytopenia and bleeding risk     PT; Stephanie on discharge when medically stable

## 2019-10-29 NOTE — PROGRESS NOTE ADULT - SUBJECTIVE AND OBJECTIVE BOX
NEPHROLOGY INTERVAL HPI/OVERNIGHT EVENTS:  pt lying flat, comfortably  no acute distress noted    MEDICATIONS  (STANDING):  albumin human 25% IVPB 100 milliLiter(s) IV Intermittent every 12 hours  atorvastatin 20 milliGRAM(s) Oral at bedtime  cefTRIAXone   IVPB 2000 milliGRAM(s) IV Intermittent <User Schedule>  cholecalciferol 1000 Unit(s) Oral daily  dextrose 5%. 1000 milliLiter(s) (50 mL/Hr) IV Continuous <Continuous>  dextrose 50% Injectable 12.5 Gram(s) IV Push once  dextrose 50% Injectable 25 Gram(s) IV Push once  dextrose 50% Injectable 25 Gram(s) IV Push once  furosemide   Injectable 40 milliGRAM(s) IV Push three times a day  influenza   Vaccine 0.5 milliLiter(s) IntraMuscular once  lactulose Syrup 30 Gram(s) Oral three times a day  lidocaine   Patch 2 Patch Transdermal daily  nystatin Ointment 1 Application(s) Topical two times a day  pantoprazole  Injectable 40 milliGRAM(s) IV Push two times a day  phytonadione   Solution 10 milliGRAM(s) Oral daily  potassium chloride    Tablet ER 40 milliEquivalent(s) Oral daily  rifAXIMin 550 milliGRAM(s) Oral two times a day  saccharomyces boulardii 250 milliGRAM(s) Oral two times a day  sucralfate 1 Gram(s) Oral every 6 hours  tamsulosin 0.4 milliGRAM(s) Oral at bedtime  ursodiol Capsule 300 milliGRAM(s) Oral three times a day    MEDICATIONS  (PRN):  dextrose 40% Gel 15 Gram(s) Oral once PRN Blood Glucose LESS THAN 70 milliGRAM(s)/deciliter  glucagon  Injectable 1 milliGRAM(s) IntraMuscular once PRN Glucose LESS THAN 70 milligrams/deciliter  ondansetron Injectable 4 milliGRAM(s) IV Push every 6 hours PRN Nausea and/or Vomiting      Allergies    codeine (Anaphylaxis)    Intolerances    NO  RED MEAT (Unknown)        Vital Signs Last 24 Hrs  T(C): 36.8 (29 Oct 2019 10:49), Max: 36.8 (29 Oct 2019 10:49)  T(F): 98.2 (29 Oct 2019 10:49), Max: 98.2 (29 Oct 2019 10:49)  HR: 93 (29 Oct 2019 10:49) (78 - 93)  BP: 121/79 (29 Oct 2019 10:49) (106/63 - 121/79)  BP(mean): --  RR: 18 (29 Oct 2019 10:49) (18 - 18)  SpO2: 95% (29 Oct 2019 10:49) (90% - 95%)    PHYSICAL EXAM:  GENERAL: Obese; chronically debilitated  EYES:  conjunctiva and sclera clear  NECK: Supple, No JVD/Carotid Bruit  NERVOUS SYSTEM:  A/O x3, no asterixis  Lungs: Diminished BS at bases   HEART:  No murmur,  No rub  ABDOMEN: Soft, NT/ND BS+; Ascites +  EXTREMITIES: + anasarca  : scrotal edema    LABS:                        8.7    6.92  )-----------( 72       ( 29 Oct 2019 06:49 )             26.7     10-29    136  |  98  |  38.0<H>  ----------------------------<  148<H>  4.0   |  27.0  |  1.11    Ca    8.7      29 Oct 2019 06:49    TPro  5.0<L>  /  Alb  2.5<L>  /  TBili  4.9<H>  /  DBili  x   /  AST  92<H>  /  ALT  45<H>  /  AlkPhos  74  10-28    PT/INR - ( 29 Oct 2019 06:49 )   PT: 25.5 sec;   INR: 2.17 ratio                 RADIOLOGY & ADDITIONAL TESTS:  < from: CT Chest No Cont (10.26.19 @ 09:58) >     EXAM:  CT CHEST                          PROCEDURE DATE:  10/26/2019          INTERPRETATION:  CLINICAL INFORMATION: Hypoxia    COMPARISON: CT abdomen 10/23/2019    PROCEDURE:   CT of the Chest was performed without intravenous contrast.  [place] coronal reformats were performed.      FINDINGS:    LUNGS AND AIRWAYS: Patent central airways.  Bilateral lower lobe   compressive atelectasis with additional subsegmental atelectasis and or   pneumonia in the lingula and posterior right middle lobe. Subsegmental   atelectasis and or pneumonia in the posterior bilateral upper lobes.   Minimal pulmonary edema.    PLEURA: Moderate bilateral pleural effusions.    MEDIASTINUM AND RONNIE: Subcentimeter mediastinal lymph nodes.    VESSELS: Atherosclerotic changes of the aorta and coronary vasculature.    HEART: Heart is mildly enlarged. No pericardial effusion.    CHEST WALL AND LOWER NECK: Diffuse anasarca. Mild right-sided   gynecomastia.    VISUALIZED UPPER ABDOMEN: Cirrhotic liver. Upper abdominal ascites and   mesenteric edema. Prior cholecystectomy.    BONES: Degenerative changes. Osteopenia.    IMPRESSION:     Moderate bilateral pleural effusions with associated bilateral lower lobe   compressive atelectasis and additional multifocal subsegmental   atelectasis and or pneumonia in the bilateral upper lobes, right middle   lobe and lingula. Additional suggestion of minimal pulmonary edema.   Recommend clinical correlation and follow-up to resolution.    Cirrhotic liver with upper abdominal ascites.    < end of copied text >

## 2019-10-29 NOTE — PROGRESS NOTE ADULT - SUBJECTIVE AND OBJECTIVE BOX
INTERVAL HPI/OVERNIGHT EVENTS:FU for cirrhosis, edema, anemia. Being diuresed. Hct is stable. No bleeding. Worried about edema    MEDICATIONS  (STANDING):  atorvastatin 20 milliGRAM(s) Oral at bedtime  cefTRIAXone   IVPB 2000 milliGRAM(s) IV Intermittent <User Schedule>  cholecalciferol 1000 Unit(s) Oral daily  dextrose 5%. 1000 milliLiter(s) (50 mL/Hr) IV Continuous <Continuous>  dextrose 50% Injectable 12.5 Gram(s) IV Push once  dextrose 50% Injectable 25 Gram(s) IV Push once  dextrose 50% Injectable 25 Gram(s) IV Push once  furosemide   Injectable 40 milliGRAM(s) IV Push three times a day  influenza   Vaccine 0.5 milliLiter(s) IntraMuscular once  lactulose Syrup 30 Gram(s) Oral three times a day  lidocaine   Patch 2 Patch Transdermal daily  nystatin Ointment 1 Application(s) Topical two times a day  pantoprazole  Injectable 40 milliGRAM(s) IV Push two times a day  phytonadione   Solution 10 milliGRAM(s) Oral daily  potassium chloride    Tablet ER 40 milliEquivalent(s) Oral daily  rifAXIMin 550 milliGRAM(s) Oral two times a day  saccharomyces boulardii 250 milliGRAM(s) Oral two times a day  sucralfate 1 Gram(s) Oral every 6 hours  tamsulosin 0.4 milliGRAM(s) Oral at bedtime  ursodiol Capsule 300 milliGRAM(s) Oral three times a day    MEDICATIONS  (PRN):  dextrose 40% Gel 15 Gram(s) Oral once PRN Blood Glucose LESS THAN 70 milliGRAM(s)/deciliter  glucagon  Injectable 1 milliGRAM(s) IntraMuscular once PRN Glucose LESS THAN 70 milligrams/deciliter  ondansetron Injectable 4 milliGRAM(s) IV Push every 6 hours PRN Nausea and/or Vomiting      Allergies    codeine (Anaphylaxis)    Intolerances    NO  RED MEAT (Unknown)      Vital Signs Last 24 Hrs  T(C): 36.7 (28 Oct 2019 21:32), Max: 36.7 (28 Oct 2019 21:32)  T(F): 98 (28 Oct 2019 21:32), Max: 98 (28 Oct 2019 21:32)  HR: 82 (29 Oct 2019 06:17) (78 - 82)  BP: 112/60 (29 Oct 2019 06:17) (106/63 - 117/70)  BP(mean): --  RR: 18 (29 Oct 2019 06:17) (18 - 18)  SpO2: 90% (29 Oct 2019 06:17) (90% - 90%)    LABS:                        8.7    6.92  )-----------( 72       ( 29 Oct 2019 06:49 )             26.7     10-29    136  |  98  |  38.0<H>  ----------------------------<  148<H>  4.0   |  27.0  |  1.11    Ca    8.7      29 Oct 2019 06:49    TPro  5.0<L>  /  Alb  2.5<L>  /  TBili  4.9<H>  /  DBili  x   /  AST  92<H>  /  ALT  45<H>  /  AlkPhos  74  10-28    PT/INR - ( 29 Oct 2019 06:49 )   PT: 25.5 sec;   INR: 2.17 ratio               RADIOLOGY & ADDITIONAL TESTS:

## 2019-10-29 NOTE — PROGRESS NOTE ADULT - ASSESSMENT
MISA:  Concepcion+<30 c/w pre renal etiology (Hepatic cirrhosis)  (+)ATN from hypotension/sepsis==> resolving  Anasarca and pleural effusions  - cont Lasix for now and avoid overdiuresis  - awaiting 24 hr urine protein  - if will need to consider RRT and UF if medical management cannot sufficiently impact fluid overload

## 2019-10-30 LAB
ANION GAP SERPL CALC-SCNC: 10 MMOL/L — SIGNIFICANT CHANGE UP (ref 5–17)
BUN SERPL-MCNC: 33 MG/DL — HIGH (ref 8–20)
CALCIUM SERPL-MCNC: 8.8 MG/DL — SIGNIFICANT CHANGE UP (ref 8.6–10.2)
CHLORIDE SERPL-SCNC: 98 MMOL/L — SIGNIFICANT CHANGE UP (ref 98–107)
CO2 SERPL-SCNC: 29 MMOL/L — SIGNIFICANT CHANGE UP (ref 22–29)
CREAT SERPL-MCNC: 1.06 MG/DL — SIGNIFICANT CHANGE UP (ref 0.5–1.3)
GLUCOSE BLDC GLUCOMTR-MCNC: 137 MG/DL — HIGH (ref 70–99)
GLUCOSE BLDC GLUCOMTR-MCNC: 140 MG/DL — HIGH (ref 70–99)
GLUCOSE BLDC GLUCOMTR-MCNC: 155 MG/DL — HIGH (ref 70–99)
GLUCOSE BLDC GLUCOMTR-MCNC: 182 MG/DL — HIGH (ref 70–99)
GLUCOSE SERPL-MCNC: 135 MG/DL — HIGH (ref 70–115)
HCT VFR BLD CALC: 26.3 % — LOW (ref 39–50)
HGB BLD-MCNC: 8.7 G/DL — LOW (ref 13–17)
MCHC RBC-ENTMCNC: 32.6 PG — SIGNIFICANT CHANGE UP (ref 27–34)
MCHC RBC-ENTMCNC: 33.1 GM/DL — SIGNIFICANT CHANGE UP (ref 32–36)
MCV RBC AUTO: 98.5 FL — SIGNIFICANT CHANGE UP (ref 80–100)
PLATELET # BLD AUTO: 73 K/UL — LOW (ref 150–400)
POTASSIUM SERPL-MCNC: 3.9 MMOL/L — SIGNIFICANT CHANGE UP (ref 3.5–5.3)
POTASSIUM SERPL-SCNC: 3.9 MMOL/L — SIGNIFICANT CHANGE UP (ref 3.5–5.3)
RBC # BLD: 2.67 M/UL — LOW (ref 4.2–5.8)
RBC # FLD: 18.6 % — HIGH (ref 10.3–14.5)
SODIUM SERPL-SCNC: 137 MMOL/L — SIGNIFICANT CHANGE UP (ref 135–145)
WBC # BLD: 6.04 K/UL — SIGNIFICANT CHANGE UP (ref 3.8–10.5)
WBC # FLD AUTO: 6.04 K/UL — SIGNIFICANT CHANGE UP (ref 3.8–10.5)

## 2019-10-30 PROCEDURE — 99233 SBSQ HOSP IP/OBS HIGH 50: CPT

## 2019-10-30 RX ORDER — ALBUMIN HUMAN 25 %
100 VIAL (ML) INTRAVENOUS EVERY 12 HOURS
Refills: 0 | Status: DISCONTINUED | OUTPATIENT
Start: 2019-10-30 | End: 2019-10-30

## 2019-10-30 RX ORDER — SENNA PLUS 8.6 MG/1
2 TABLET ORAL AT BEDTIME
Refills: 0 | Status: DISCONTINUED | OUTPATIENT
Start: 2019-10-30 | End: 2019-11-08

## 2019-10-30 RX ORDER — FUROSEMIDE 40 MG
40 TABLET ORAL EVERY 12 HOURS
Refills: 0 | Status: COMPLETED | OUTPATIENT
Start: 2019-10-30 | End: 2019-11-03

## 2019-10-30 RX ORDER — ALBUMIN HUMAN 25 %
100 VIAL (ML) INTRAVENOUS EVERY 12 HOURS
Refills: 0 | Status: COMPLETED | OUTPATIENT
Start: 2019-10-30 | End: 2019-11-02

## 2019-10-30 RX ADMIN — LIDOCAINE 2 PATCH: 4 CREAM TOPICAL at 12:03

## 2019-10-30 RX ADMIN — LACTULOSE 30 GRAM(S): 10 SOLUTION ORAL at 05:50

## 2019-10-30 RX ADMIN — Medication 50 MILLILITER(S): at 18:54

## 2019-10-30 RX ADMIN — LIDOCAINE 2 PATCH: 4 CREAM TOPICAL at 19:00

## 2019-10-30 RX ADMIN — Medication 1 GRAM(S): at 12:02

## 2019-10-30 RX ADMIN — Medication 50 MILLILITER(S): at 05:48

## 2019-10-30 RX ADMIN — NYSTATIN CREAM 1 APPLICATION(S): 100000 CREAM TOPICAL at 18:55

## 2019-10-30 RX ADMIN — Medication 1 GRAM(S): at 05:51

## 2019-10-30 RX ADMIN — Medication 1 GRAM(S): at 18:56

## 2019-10-30 RX ADMIN — Medication 250 MILLIGRAM(S): at 05:51

## 2019-10-30 RX ADMIN — ATORVASTATIN CALCIUM 20 MILLIGRAM(S): 80 TABLET, FILM COATED ORAL at 21:44

## 2019-10-30 RX ADMIN — URSODIOL 300 MILLIGRAM(S): 250 TABLET, FILM COATED ORAL at 05:50

## 2019-10-30 RX ADMIN — PANTOPRAZOLE SODIUM 40 MILLIGRAM(S): 20 TABLET, DELAYED RELEASE ORAL at 05:50

## 2019-10-30 RX ADMIN — NYSTATIN CREAM 1 APPLICATION(S): 100000 CREAM TOPICAL at 05:51

## 2019-10-30 RX ADMIN — LACTULOSE 30 GRAM(S): 10 SOLUTION ORAL at 21:43

## 2019-10-30 RX ADMIN — Medication 40 MILLIEQUIVALENT(S): at 12:02

## 2019-10-30 RX ADMIN — Medication 40 MILLIGRAM(S): at 15:32

## 2019-10-30 RX ADMIN — PANTOPRAZOLE SODIUM 40 MILLIGRAM(S): 20 TABLET, DELAYED RELEASE ORAL at 18:54

## 2019-10-30 RX ADMIN — LACTULOSE 30 GRAM(S): 10 SOLUTION ORAL at 15:32

## 2019-10-30 RX ADMIN — URSODIOL 300 MILLIGRAM(S): 250 TABLET, FILM COATED ORAL at 18:55

## 2019-10-30 RX ADMIN — Medication 250 MILLIGRAM(S): at 18:55

## 2019-10-30 RX ADMIN — Medication 40 MILLIGRAM(S): at 05:50

## 2019-10-30 RX ADMIN — URSODIOL 300 MILLIGRAM(S): 250 TABLET, FILM COATED ORAL at 21:44

## 2019-10-30 RX ADMIN — Medication 1000 UNIT(S): at 12:02

## 2019-10-30 RX ADMIN — CEFTRIAXONE 100 MILLIGRAM(S): 500 INJECTION, POWDER, FOR SOLUTION INTRAMUSCULAR; INTRAVENOUS at 12:02

## 2019-10-30 RX ADMIN — TAMSULOSIN HYDROCHLORIDE 0.4 MILLIGRAM(S): 0.4 CAPSULE ORAL at 21:44

## 2019-10-30 NOTE — PROGRESS NOTE ADULT - SUBJECTIVE AND OBJECTIVE BOX
INTERVAL HPI/OVERNIGHT EVENTS: s/p IV albumin  Has been urinating a lot. No rectal bleeding or melena.     ROS wnl     PAST MEDICAL & SURGICAL HISTORY:  GIB (gastrointestinal bleeding)  GERD with esophagitis: Gastritis &amp; Non Bleeding Ulcers  Hepatic encephalopathy  Obesity  Fatty liver disease, nonalcoholic  Renal stones: 25 years ago  Hypertension  Neuropathy  Hypercholesteremia  Diabetes  S/P cholecystectomy      Home Medications:  aluminum hydroxide-magnesium hydroxide 200 mg-200 mg/5 mL oral suspension: 30 milliliter(s) orally every 4 hours, As needed, Dyspepsia (09 Oct 2019 12:34)  furosemide 20 mg oral tablet: 2 tab(s) orally once a day   (12 Oct 2019 10:51)  gabapentin 100 mg oral capsule: 2 cap(s) orally 4 times a day (09 Oct 2019 12:34)  insulin glargine: 40 unit(s) subcutaneous once a day (12 Oct 2019 10:51)  phytonadione 100 mcg oral tablet: 1 tab(s) orally once a day (09 Oct 2019 12:34)  pravastatin 40 mg oral tablet: 1 tab(s) orally once a day (09 Oct 2019 12:34)  rifAXIMin 550 mg oral tablet: 1 tab(s) orally 2 times a day (09 Oct 2019 12:34)  Vitamin D3 2000 intl units oral tablet: 1 tab(s) orally once a day (09 Oct 2019 12:34)      MEDICATIONS  (STANDING):  albumin human 25% IVPB 100 milliLiter(s) IV Intermittent every 12 hours  atorvastatin 20 milliGRAM(s) Oral at bedtime  cefTRIAXone   IVPB 2000 milliGRAM(s) IV Intermittent <User Schedule>  cholecalciferol 1000 Unit(s) Oral daily  dextrose 5%. 1000 milliLiter(s) (50 mL/Hr) IV Continuous <Continuous>  dextrose 50% Injectable 12.5 Gram(s) IV Push once  dextrose 50% Injectable 25 Gram(s) IV Push once  dextrose 50% Injectable 25 Gram(s) IV Push once  furosemide   Injectable 40 milliGRAM(s) IV Push three times a day  influenza   Vaccine 0.5 milliLiter(s) IntraMuscular once  lactulose Syrup 30 Gram(s) Oral three times a day  lidocaine   Patch 2 Patch Transdermal daily  nystatin Ointment 1 Application(s) Topical two times a day  pantoprazole  Injectable 40 milliGRAM(s) IV Push two times a day  potassium chloride    Tablet ER 40 milliEquivalent(s) Oral daily  rifAXIMin 550 milliGRAM(s) Oral two times a day  saccharomyces boulardii 250 milliGRAM(s) Oral two times a day  sucralfate 1 Gram(s) Oral every 6 hours  tamsulosin 0.4 milliGRAM(s) Oral at bedtime  ursodiol Capsule 300 milliGRAM(s) Oral three times a day    MEDICATIONS  (PRN):  dextrose 40% Gel 15 Gram(s) Oral once PRN Blood Glucose LESS THAN 70 milliGRAM(s)/deciliter  glucagon  Injectable 1 milliGRAM(s) IntraMuscular once PRN Glucose LESS THAN 70 milligrams/deciliter  ondansetron Injectable 4 milliGRAM(s) IV Push every 6 hours PRN Nausea and/or Vomiting      Allergies    codeine (Anaphylaxis)    Intolerances    NO  RED MEAT (Unknown)        PHYSICAL EXAM:   Vital Signs:  Vital Signs Last 24 Hrs  T(C): 36.7 (29 Oct 2019 20:07), Max: 36.8 (29 Oct 2019 10:49)  T(F): 98 (29 Oct 2019 20:07), Max: 98.2 (29 Oct 2019 10:49)  HR: 79 (30 Oct 2019 05:47) (78 - 93)  BP: 121/56 (30 Oct 2019 05:47) (111/64 - 145/72)  BP(mean): --  RR: 18 (30 Oct 2019 05:47) (18 - 20)  SpO2: 92% (30 Oct 2019 05:47) (91% - 95%)  Daily     Daily Weight in k (30 Oct 2019 07:07)    GENERAL:  no distress  HEENT:  NC/AT,  anicteric  ABDOMEN:  Soft, obese abdomen, non-tender, non-distended, normoactive bowel sounds,  no masses  EXTREMITIES  +1 edema no cyanosis      LABS:                        8.7    6.04  )-----------( 73       ( 30 Oct 2019 07:42 )             26.3       Hemoglobin: 8.7 g/dL (10-30-19 @ 07:42)  Hemoglobin: 8.7 g/dL (10-29-19 @ 06:49)  Hemoglobin: 8.5 g/dL (10-28-19 @ 07:40)      10-30    137  |  98  |  33.0<H>  ----------------------------<  135<H>  3.9   |  29.0  |  1.06    Ca    8.8      30 Oct 2019 07:42        INR: 2.17 ratio (10-29-19 @ 06:49)  INR: 2.13 ratio (10-28-19 @ 07:40)      Aspartate Aminotransferase (AST/SGOT): 92 U/L (10-28-19 @ 07:40)  Alkaline Phosphatase, Serum: 74 U/L (10-28-19 @ 07:40)  Alanine Aminotransferase (ALT/SGPT): 45 U/L (10-28-19 @ 07:40)    RADIOLOGY & ADDITIONAL TESTS:  < from: CT Chest No Cont (10.26.19 @ 09:58) >  FINDINGS:    LUNGS AND AIRWAYS: Patent central airways.  Bilateral lower lobe   compressive atelectasis with additional subsegmental atelectasis and or   pneumonia in the lingula and posterior right middle lobe. Subsegmental   atelectasis and or pneumonia in the posterior bilateral upper lobes.   Minimal pulmonary edema.    PLEURA: Moderate bilateral pleural effusions.    MEDIASTINUM AND RONNIE: Subcentimeter mediastinal lymph nodes.    VESSELS: Atherosclerotic changes of the aorta and coronary vasculature.    HEART: Heart is mildly enlarged. No pericardial effusion.    CHEST WALL AND LOWER NECK: Diffuse anasarca. Mild right-sided   gynecomastia.    VISUALIZED UPPER ABDOMEN: Cirrhotic liver. Upper abdominal ascites and   mesenteric edema. Prior cholecystectomy.    BONES: Degenerative changes. Osteopenia.    IMPRESSION:     Moderate bilateral pleural effusions with associated bilateral lower lobe   compressive atelectasis and additional multifocal subsegmental   atelectasis and or pneumonia in the bilateral upper lobes, right middle   lobe and lingula. Additional suggestion of minimal pulmonary edema.   Recommend clinical correlation and follow-up to resolution.  Cirrhotic liver with upper abdominal ascites.    < end of copied text >

## 2019-10-30 NOTE — PROGRESS NOTE ADULT - ASSESSMENT
MISA:  Concepcion+<30 c/w pre renal etiology (Hepatic cirrhosis)  (+)ATN from hypotension/sepsis==> resolving  Anasarca and pleural effusions  - cont Lasix for now and add SPA and Metolazone for now;   If creat/BUN rises, then will have to lower the dose  _ labs am  - if will need to consider RRT and UF if medical management cannot sufficiently impact fluid overload

## 2019-10-30 NOTE — PROGRESS NOTE ADULT - ASSESSMENT
63y man with cirrhosis presented with JEAN cirrhosis, ascites and LE edema. No active gi bleeding. Hgb stable   PPI PO BID   cont diuretics, monitor CR  monitor I's and O's  would benefit from Hepatology transplant referral as an out patient    Thanks

## 2019-10-30 NOTE — PROGRESS NOTE ADULT - SUBJECTIVE AND OBJECTIVE BOX
Patient seen and examined    I&O's Summary    29 Oct 2019 07:01  -  30 Oct 2019 07:00  --------------------------------------------------------  IN: 0 mL / OUT: 1750 mL / NET: -1750 mL    30 Oct 2019 07:01  -  30 Oct 2019 19:17  --------------------------------------------------------  IN: 0 mL / OUT: 2850 mL / NET: -2850 mL    Feels sl better but still has lot of swelling    REVIEW OF SYSTEMS:    CONSTITUTIONAL: No F/C  RESPIRATORY: No cough,  No SOB  CARDIOVASCULAR: No CP/palpitations,    GASTROINTESTINAL: No abdominal pain  or NVD but distended  GENITOURINARY: No UTI sx  NEUROLOGICAL: No headaches, NO wk/numbness  MUSCULOSKELETAL:   EXTREMITIES : Mod/severe swelling,    Vital Signs Last 24 Hrs  T(C): 36.8 (30 Oct 2019 17:09), Max: 36.8 (30 Oct 2019 10:05)  T(F): 98.2 (30 Oct 2019 17:09), Max: 98.3 (30 Oct 2019 10:05)  HR: 82 (30 Oct 2019 17:09) (79 - 82)  BP: 111/63 (30 Oct 2019 17:09) (106/58 - 145/72)  BP(mean): --  RR: 20 (30 Oct 2019 17:09) (18 - 20)  SpO2: 91% (30 Oct 2019 10:05) (91% - 92%)    PHYSICAL EXAM:    GENERAL: NAD,   EYES:  conjunctiva and sclera clear  NECK: Supple, No JVD/Bruit  NERVOUS SYSTEM:  A/O x3,   CHEST:  No rales or rhonchi  HEART:  RRR, No murmur  ABDOMEN: Soft, NT/ND BS+  EXTREMITIES:  2+ LE Edema and scrotal edema  SKIN: No rashes    LABS:                          8.7    6.04  )-----------( 73       ( 30 Oct 2019 07:42 )             26.3     10-30    137  |  98  |  33.0<H>  ----------------------------<  135<H>  3.9   |  29.0  |  1.06    Ca    8.8      30 Oct 2019 07:42        MEDICATIONS  (STANDING):  albumin human 25% IVPB  atorvastatin  cefTRIAXone   IVPB  cholecalciferol  dextrose 40% Gel PRN  dextrose 5%.  dextrose 50% Injectable  dextrose 50% Injectable  dextrose 50% Injectable  furosemide   Injectable  glucagon  Injectable PRN  influenza   Vaccine  lactulose Syrup  lidocaine   Patch  metolazone  nystatin Ointment  ondansetron Injectable PRN  pantoprazole  Injectable  potassium chloride    Tablet ER  rifAXIMin  saccharomyces boulardii  senna  sucralfate  tamsulosin  ursodiol Capsule

## 2019-10-30 NOTE — PROGRESS NOTE ADULT - SUBJECTIVE AND OBJECTIVE BOX
CC: follow up    INTERVAL HPI/OVERNIGHT EVENTS: Patient seen and examined, no acute complaints overnight. last bm 2 days ago. abdominal distension slowly improving. Denies sob, chest pain or palpitations.       Vital Signs Last 24 Hrs  T(C): 36.8 (30 Oct 2019 10:05), Max: 36.8 (30 Oct 2019 10:05)  T(F): 98.3 (30 Oct 2019 10:05), Max: 98.3 (30 Oct 2019 10:05)  HR: 79 (30 Oct 2019 10:05) (79 - 83)  BP: 106/58 (30 Oct 2019 10:05) (106/58 - 145/72)  BP(mean): --  RR: 18 (30 Oct 2019 10:05) (18 - 20)  SpO2: 91% (30 Oct 2019 10:05) (91% - 92%)    PHYSICAL EXAM:    GENERAL: NAD, AOX3 obese   HEAD:  Atraumatic, Normocephalic  ENMT: Moist mucous membranes  CHEST/LUNG: Clear to auscultation bilaterally; No rales, rhonchi, wheezing, or rubs  HEART: Regular rate and rhythm; No murmurs, rubs, or gallops  ABDOMEN: Soft, Nontender,+distended; Bowel sounds present  + Scrotal edema   EXTREMITIES:  2+ Peripheral Pulses, No clubbing, cyanosis  2+ pedal edema       MEDICATIONS  (STANDING):  albumin human 25% IVPB 100 milliLiter(s) IV Intermittent every 12 hours  atorvastatin 20 milliGRAM(s) Oral at bedtime  cefTRIAXone   IVPB 2000 milliGRAM(s) IV Intermittent <User Schedule>  cholecalciferol 1000 Unit(s) Oral daily  dextrose 5%. 1000 milliLiter(s) (50 mL/Hr) IV Continuous <Continuous>  dextrose 50% Injectable 12.5 Gram(s) IV Push once  dextrose 50% Injectable 25 Gram(s) IV Push once  dextrose 50% Injectable 25 Gram(s) IV Push once  furosemide   Injectable 40 milliGRAM(s) IV Push three times a day  influenza   Vaccine 0.5 milliLiter(s) IntraMuscular once  lactulose Syrup 30 Gram(s) Oral three times a day  lidocaine   Patch 2 Patch Transdermal daily  nystatin Ointment 1 Application(s) Topical two times a day  pantoprazole  Injectable 40 milliGRAM(s) IV Push two times a day  potassium chloride    Tablet ER 40 milliEquivalent(s) Oral daily  rifAXIMin 550 milliGRAM(s) Oral two times a day  saccharomyces boulardii 250 milliGRAM(s) Oral two times a day  senna 2 Tablet(s) Oral at bedtime  sucralfate 1 Gram(s) Oral every 6 hours  tamsulosin 0.4 milliGRAM(s) Oral at bedtime  ursodiol Capsule 300 milliGRAM(s) Oral three times a day    MEDICATIONS  (PRN):  dextrose 40% Gel 15 Gram(s) Oral once PRN Blood Glucose LESS THAN 70 milliGRAM(s)/deciliter  glucagon  Injectable 1 milliGRAM(s) IntraMuscular once PRN Glucose LESS THAN 70 milligrams/deciliter  ondansetron Injectable 4 milliGRAM(s) IV Push every 6 hours PRN Nausea and/or Vomiting      Allergies    codeine (Anaphylaxis)    Intolerances    NO  RED MEAT (Unknown)        LABS:                          8.7    6.04  )-----------( 73       ( 30 Oct 2019 07:42 )             26.3     10-30    137  |  98  |  33.0<H>  ----------------------------<  135<H>  3.9   |  29.0  |  1.06    Ca    8.8      30 Oct 2019 07:42      PT/INR - ( 29 Oct 2019 06:49 )   PT: 25.5 sec;   INR: 2.17 ratio               RADIOLOGY & ADDITIONAL TESTS:

## 2019-10-31 LAB
ALBUMIN SERPL ELPH-MCNC: 3.1 G/DL — LOW (ref 3.3–5.2)
ALP SERPL-CCNC: 95 U/L — SIGNIFICANT CHANGE UP (ref 40–120)
ALT FLD-CCNC: 34 U/L — SIGNIFICANT CHANGE UP
ANION GAP SERPL CALC-SCNC: 12 MMOL/L — SIGNIFICANT CHANGE UP (ref 5–17)
AST SERPL-CCNC: 60 U/L — HIGH
BILIRUB SERPL-MCNC: 4.9 MG/DL — HIGH (ref 0.4–2)
BUN SERPL-MCNC: 31 MG/DL — HIGH (ref 8–20)
CALCIUM SERPL-MCNC: 9 MG/DL — SIGNIFICANT CHANGE UP (ref 8.6–10.2)
CHLORIDE SERPL-SCNC: 96 MMOL/L — LOW (ref 98–107)
CO2 SERPL-SCNC: 28 MMOL/L — SIGNIFICANT CHANGE UP (ref 22–29)
CREAT SERPL-MCNC: 1.11 MG/DL — SIGNIFICANT CHANGE UP (ref 0.5–1.3)
GLUCOSE BLDC GLUCOMTR-MCNC: 145 MG/DL — HIGH (ref 70–99)
GLUCOSE BLDC GLUCOMTR-MCNC: 149 MG/DL — HIGH (ref 70–99)
GLUCOSE BLDC GLUCOMTR-MCNC: 174 MG/DL — HIGH (ref 70–99)
GLUCOSE SERPL-MCNC: 147 MG/DL — HIGH (ref 70–115)
HCT VFR BLD CALC: 26.4 % — LOW (ref 39–50)
HGB BLD-MCNC: 8.7 G/DL — LOW (ref 13–17)
INR BLD: 2.16 RATIO — HIGH (ref 0.88–1.16)
MAGNESIUM SERPL-MCNC: 1.7 MG/DL — SIGNIFICANT CHANGE UP (ref 1.6–2.6)
MCHC RBC-ENTMCNC: 32.8 PG — SIGNIFICANT CHANGE UP (ref 27–34)
MCHC RBC-ENTMCNC: 33 GM/DL — SIGNIFICANT CHANGE UP (ref 32–36)
MCV RBC AUTO: 99.6 FL — SIGNIFICANT CHANGE UP (ref 80–100)
PLATELET # BLD AUTO: 67 K/UL — LOW (ref 150–400)
POTASSIUM SERPL-MCNC: 4.1 MMOL/L — SIGNIFICANT CHANGE UP (ref 3.5–5.3)
POTASSIUM SERPL-SCNC: 4.1 MMOL/L — SIGNIFICANT CHANGE UP (ref 3.5–5.3)
PROT SERPL-MCNC: 5.5 G/DL — LOW (ref 6.6–8.7)
PROTHROM AB SERPL-ACNC: 25.4 SEC — HIGH (ref 10–12.9)
RBC # BLD: 2.65 M/UL — LOW (ref 4.2–5.8)
RBC # FLD: 19.1 % — HIGH (ref 10.3–14.5)
SODIUM SERPL-SCNC: 136 MMOL/L — SIGNIFICANT CHANGE UP (ref 135–145)
WBC # BLD: 5.73 K/UL — SIGNIFICANT CHANGE UP (ref 3.8–10.5)
WBC # FLD AUTO: 5.73 K/UL — SIGNIFICANT CHANGE UP (ref 3.8–10.5)

## 2019-10-31 PROCEDURE — 99233 SBSQ HOSP IP/OBS HIGH 50: CPT

## 2019-10-31 RX ORDER — INSULIN LISPRO 100/ML
VIAL (ML) SUBCUTANEOUS
Refills: 0 | Status: DISCONTINUED | OUTPATIENT
Start: 2019-10-31 | End: 2019-11-08

## 2019-10-31 RX ORDER — SPIRONOLACTONE 25 MG/1
25 TABLET, FILM COATED ORAL DAILY
Refills: 0 | Status: DISCONTINUED | OUTPATIENT
Start: 2019-10-31 | End: 2019-11-08

## 2019-10-31 RX ADMIN — Medication 50 MILLILITER(S): at 17:41

## 2019-10-31 RX ADMIN — Medication 50 MILLILITER(S): at 06:08

## 2019-10-31 RX ADMIN — LIDOCAINE 2 PATCH: 4 CREAM TOPICAL at 13:08

## 2019-10-31 RX ADMIN — LIDOCAINE 2 PATCH: 4 CREAM TOPICAL at 00:03

## 2019-10-31 RX ADMIN — PANTOPRAZOLE SODIUM 40 MILLIGRAM(S): 20 TABLET, DELAYED RELEASE ORAL at 17:36

## 2019-10-31 RX ADMIN — Medication 1 GRAM(S): at 05:55

## 2019-10-31 RX ADMIN — PANTOPRAZOLE SODIUM 40 MILLIGRAM(S): 20 TABLET, DELAYED RELEASE ORAL at 05:58

## 2019-10-31 RX ADMIN — NYSTATIN CREAM 1 APPLICATION(S): 100000 CREAM TOPICAL at 05:57

## 2019-10-31 RX ADMIN — NYSTATIN CREAM 1 APPLICATION(S): 100000 CREAM TOPICAL at 17:36

## 2019-10-31 RX ADMIN — Medication 250 MILLIGRAM(S): at 17:38

## 2019-10-31 RX ADMIN — Medication 40 MILLIGRAM(S): at 05:55

## 2019-10-31 RX ADMIN — LACTULOSE 30 GRAM(S): 10 SOLUTION ORAL at 13:21

## 2019-10-31 RX ADMIN — SENNA PLUS 2 TABLET(S): 8.6 TABLET ORAL at 23:23

## 2019-10-31 RX ADMIN — URSODIOL 300 MILLIGRAM(S): 250 TABLET, FILM COATED ORAL at 13:10

## 2019-10-31 RX ADMIN — Medication 250 MILLIGRAM(S): at 05:56

## 2019-10-31 RX ADMIN — LACTULOSE 30 GRAM(S): 10 SOLUTION ORAL at 06:07

## 2019-10-31 RX ADMIN — TAMSULOSIN HYDROCHLORIDE 0.4 MILLIGRAM(S): 0.4 CAPSULE ORAL at 23:23

## 2019-10-31 RX ADMIN — Medication 40 MILLIEQUIVALENT(S): at 13:09

## 2019-10-31 RX ADMIN — CEFTRIAXONE 100 MILLIGRAM(S): 500 INJECTION, POWDER, FOR SOLUTION INTRAMUSCULAR; INTRAVENOUS at 13:12

## 2019-10-31 RX ADMIN — Medication 1 GRAM(S): at 17:37

## 2019-10-31 RX ADMIN — URSODIOL 300 MILLIGRAM(S): 250 TABLET, FILM COATED ORAL at 23:23

## 2019-10-31 RX ADMIN — Medication 1 GRAM(S): at 13:09

## 2019-10-31 RX ADMIN — ATORVASTATIN CALCIUM 20 MILLIGRAM(S): 80 TABLET, FILM COATED ORAL at 23:23

## 2019-10-31 RX ADMIN — Medication 1000 UNIT(S): at 13:09

## 2019-10-31 RX ADMIN — Medication 1: at 17:34

## 2019-10-31 RX ADMIN — SENNA PLUS 2 TABLET(S): 8.6 TABLET ORAL at 00:13

## 2019-10-31 RX ADMIN — Medication 1 GRAM(S): at 23:23

## 2019-10-31 RX ADMIN — Medication 40 MILLIGRAM(S): at 17:37

## 2019-10-31 RX ADMIN — URSODIOL 300 MILLIGRAM(S): 250 TABLET, FILM COATED ORAL at 05:56

## 2019-10-31 RX ADMIN — LACTULOSE 30 GRAM(S): 10 SOLUTION ORAL at 23:22

## 2019-10-31 RX ADMIN — Medication 1 GRAM(S): at 00:13

## 2019-10-31 RX ADMIN — LIDOCAINE 2 PATCH: 4 CREAM TOPICAL at 19:00

## 2019-10-31 NOTE — PROGRESS NOTE ADULT - SUBJECTIVE AND OBJECTIVE BOX
INTERVAL HPI/OVERNIGHT EVENTS:  Has been urinating a lot.  He denies abdominal pain. No melena or hematochezia.     ROS wnl     PAST MEDICAL & SURGICAL HISTORY:  GIB (gastrointestinal bleeding)  GERD with esophagitis: Gastritis &amp; Non Bleeding Ulcers  Hepatic encephalopathy  Obesity  Fatty liver disease, nonalcoholic  Renal stones: 25 years ago  Hypertension  Neuropathy  Hypercholesteremia  Diabetes  S/P cholecystectomy      Home Medications:  aluminum hydroxide-magnesium hydroxide 200 mg-200 mg/5 mL oral suspension: 30 milliliter(s) orally every 4 hours, As needed, Dyspepsia (09 Oct 2019 12:34)  furosemide 20 mg oral tablet: 2 tab(s) orally once a day   (12 Oct 2019 10:51)  gabapentin 100 mg oral capsule: 2 cap(s) orally 4 times a day (09 Oct 2019 12:34)  insulin glargine: 40 unit(s) subcutaneous once a day (12 Oct 2019 10:51)  phytonadione 100 mcg oral tablet: 1 tab(s) orally once a day (09 Oct 2019 12:34)  pravastatin 40 mg oral tablet: 1 tab(s) orally once a day (09 Oct 2019 12:34)  rifAXIMin 550 mg oral tablet: 1 tab(s) orally 2 times a day (09 Oct 2019 12:34)  Vitamin D3 2000 intl units oral tablet: 1 tab(s) orally once a day (09 Oct 2019 12:34)      MEDICATIONS  (STANDING):  albumin human 25% IVPB 100 milliLiter(s) IV Intermittent every 12 hours  atorvastatin 20 milliGRAM(s) Oral at bedtime  cefTRIAXone   IVPB 2000 milliGRAM(s) IV Intermittent <User Schedule>  cholecalciferol 1000 Unit(s) Oral daily  dextrose 5%. 1000 milliLiter(s) (50 mL/Hr) IV Continuous <Continuous>  dextrose 50% Injectable 12.5 Gram(s) IV Push once  dextrose 50% Injectable 25 Gram(s) IV Push once  dextrose 50% Injectable 25 Gram(s) IV Push once  furosemide   Injectable 40 milliGRAM(s) IV Push every 12 hours  influenza   Vaccine 0.5 milliLiter(s) IntraMuscular once  lactulose Syrup 30 Gram(s) Oral three times a day  lidocaine   Patch 2 Patch Transdermal daily  metolazone 2.5 milliGRAM(s) Oral daily  nystatin Ointment 1 Application(s) Topical two times a day  pantoprazole  Injectable 40 milliGRAM(s) IV Push two times a day  potassium chloride    Tablet ER 40 milliEquivalent(s) Oral daily  rifAXIMin 550 milliGRAM(s) Oral two times a day  saccharomyces boulardii 250 milliGRAM(s) Oral two times a day  senna 2 Tablet(s) Oral at bedtime  sucralfate 1 Gram(s) Oral every 6 hours  tamsulosin 0.4 milliGRAM(s) Oral at bedtime  ursodiol Capsule 300 milliGRAM(s) Oral three times a day    MEDICATIONS  (PRN):  dextrose 40% Gel 15 Gram(s) Oral once PRN Blood Glucose LESS THAN 70 milliGRAM(s)/deciliter  glucagon  Injectable 1 milliGRAM(s) IntraMuscular once PRN Glucose LESS THAN 70 milligrams/deciliter  ondansetron Injectable 4 milliGRAM(s) IV Push every 6 hours PRN Nausea and/or Vomiting      Allergies    codeine (Anaphylaxis)    Intolerances    NO  RED MEAT (Unknown)        PHYSICAL EXAM:   Vital Signs:  Vital Signs Last 24 Hrs  T(C): 36.7 (30 Oct 2019 21:46), Max: 36.8 (30 Oct 2019 10:05)  T(F): 98.1 (30 Oct 2019 21:46), Max: 98.3 (30 Oct 2019 10:05)  HR: 80 (30 Oct 2019 21:46) (79 - 82)  BP: 114/57 (30 Oct 2019 21:46) (106/58 - 114/57)  BP(mean): --  RR: 18 (30 Oct 2019 21:46) (18 - 20)  SpO2: 93% (30 Oct 2019 21:46) (91% - 93%)  Daily     Daily     GENERAL:  no distress  HEENT:  NC/AT,  anicteric  ABDOMEN:  Soft, non-tender, non-distended, normoactive bowel sounds,  no masses  EXTEREMITIES:  no cyanosis      LABS:                        8.7    5.73  )-----------( 67       ( 31 Oct 2019 07:41 )             26.4       Hemoglobin: 8.7 g/dL (10-31-19 @ 07:41)  Hemoglobin: 8.7 g/dL (10-30-19 @ 07:42)  Hemoglobin: 8.7 g/dL (10-29-19 @ 06:49)      10-30    137  |  98  |  33.0<H>  ----------------------------<  135<H>  3.9   |  29.0  |  1.06    Ca    8.8      30 Oct 2019 07:42        INR: 2.16 ratio (10-31-19 @ 07:41)  INR: 2.17 ratio (10-29-19 @ 06:49)        RADIOLOGY & ADDITIONAL TESTS:  < from: CT Chest No Cont (10.26.19 @ 09:58) >  FINDINGS:    LUNGS AND AIRWAYS: Patent central airways.  Bilateral lower lobe   compressive atelectasis with additional subsegmental atelectasis and or   pneumonia in the lingula and posterior right middle lobe. Subsegmental   atelectasis and or pneumonia in the posterior bilateral upper lobes.   Minimal pulmonary edema.    PLEURA: Moderate bilateral pleural effusions.    MEDIASTINUM AND RONNIE: Subcentimeter mediastinal lymph nodes.    VESSELS: Atherosclerotic changes of the aorta and coronary vasculature.    HEART: Heart is mildly enlarged. No pericardial effusion.    CHEST WALL AND LOWER NECK: Diffuse anasarca. Mild right-sided   gynecomastia.    VISUALIZED UPPER ABDOMEN: Cirrhotic liver. Upper abdominal ascites and   mesenteric edema. Prior cholecystectomy.    BONES: Degenerative changes. Osteopenia.    IMPRESSION:     Moderate bilateral pleural effusions with associated bilateral lower lobe   compressive atelectasis and additional multifocal subsegmental   atelectasis and or pneumonia in the bilateral upper lobes, right middle   lobe and lingula. Additional suggestion of minimal pulmonary edema.   Recommend clinical correlation and follow-up to resolution.    Cirrhotic liver with upper abdominal ascites.      < end of copied text >

## 2019-10-31 NOTE — PROGRESS NOTE ADULT - SUBJECTIVE AND OBJECTIVE BOX
CC: Follow up    INTERVAL HPI/OVERNIGHT EVENTS: Patient seen and examined, still has significant scrotal and leg edema. Currently in bed on o2 via nasal cannula 2 liters      Vital Signs Last 24 Hrs  T(C): 36.6 (31 Oct 2019 08:46), Max: 36.8 (30 Oct 2019 17:09)  T(F): 97.9 (31 Oct 2019 08:46), Max: 98.2 (30 Oct 2019 17:09)  HR: 80 (31 Oct 2019 08:46) (80 - 82)  BP: 120/54 (31 Oct 2019 08:46) (111/63 - 120/54)  BP(mean): --  RR: 18 (31 Oct 2019 08:46) (18 - 20)  SpO2: 84% (31 Oct 2019 08:46) (84% - 93%)    PHYSICAL EXAM:    GENERAL: NAD, AOX3 obese  HEAD:  Atraumatic, Normocephalic  EYES: EOMI, PERRLA, conjunctiva and sclera clear  ENMT: Moist mucous membranes  CHEST/LUNG: Clear to auscultation bilaterally; No rales, rhonchi, wheezing, or rubs  HEART: Regular rate and rhythm; No murmurs, rubs, or gallops  ABDOMEN: Soft, Nontender, Distended; Bowel sounds present  Scrotal edema  EXTREMITIES:  2+ Peripheral Pulses, No clubbing, cyanosis  2+ pedal edema bilaterally        MEDICATIONS  (STANDING):  albumin human 25% IVPB 100 milliLiter(s) IV Intermittent every 12 hours  atorvastatin 20 milliGRAM(s) Oral at bedtime  cefTRIAXone   IVPB 2000 milliGRAM(s) IV Intermittent <User Schedule>  cholecalciferol 1000 Unit(s) Oral daily  dextrose 5%. 1000 milliLiter(s) (50 mL/Hr) IV Continuous <Continuous>  dextrose 50% Injectable 12.5 Gram(s) IV Push once  dextrose 50% Injectable 25 Gram(s) IV Push once  dextrose 50% Injectable 25 Gram(s) IV Push once  furosemide   Injectable 40 milliGRAM(s) IV Push every 12 hours  influenza   Vaccine 0.5 milliLiter(s) IntraMuscular once  lactulose Syrup 30 Gram(s) Oral three times a day  lidocaine   Patch 2 Patch Transdermal daily  metolazone 2.5 milliGRAM(s) Oral daily  nystatin Ointment 1 Application(s) Topical two times a day  pantoprazole  Injectable 40 milliGRAM(s) IV Push two times a day  potassium chloride    Tablet ER 40 milliEquivalent(s) Oral daily  rifAXIMin 550 milliGRAM(s) Oral two times a day  saccharomyces boulardii 250 milliGRAM(s) Oral two times a day  senna 2 Tablet(s) Oral at bedtime  spironolactone 25 milliGRAM(s) Oral daily  sucralfate 1 Gram(s) Oral every 6 hours  tamsulosin 0.4 milliGRAM(s) Oral at bedtime  ursodiol Capsule 300 milliGRAM(s) Oral three times a day    MEDICATIONS  (PRN):  dextrose 40% Gel 15 Gram(s) Oral once PRN Blood Glucose LESS THAN 70 milliGRAM(s)/deciliter  glucagon  Injectable 1 milliGRAM(s) IntraMuscular once PRN Glucose LESS THAN 70 milligrams/deciliter  ondansetron Injectable 4 milliGRAM(s) IV Push every 6 hours PRN Nausea and/or Vomiting      Allergies    codeine (Anaphylaxis)    Intolerances    NO  RED MEAT (Unknown)        LABS:                          8.7    5.73  )-----------( 67       ( 31 Oct 2019 07:41 )             26.4     10-31    136  |  96<L>  |  31.0<H>  ----------------------------<  147<H>  4.1   |  28.0  |  1.11    Ca    9.0      31 Oct 2019 07:41  Mg     1.7     10-31    TPro  5.5<L>  /  Alb  3.1<L>  /  TBili  4.9<H>  /  DBili  x   /  AST  60<H>  /  ALT  34  /  AlkPhos  95  10-31    PT/INR - ( 31 Oct 2019 07:41 )   PT: 25.4 sec;   INR: 2.16 ratio               RADIOLOGY & ADDITIONAL TESTS:

## 2019-10-31 NOTE — CHART NOTE - NSCHARTNOTEFT_GEN_A_CORE
Source: Patient [x ]  Family [ ]   other [x ]EMR    Current Diet: Consistent CHO, Dash 1L FR    PO intake:  < 50% [x ]   50-75%  [ ]   %  [ ]  other :    Source for PO intake [ x] Patient [ ] family [x ] chart [ ] staff [ ] other    Enteral /Parenteral Nutrition:     Current Weight: 10/30 124kg, 10/29 122.1kg, bedscale weight today 128kg    % Weight Change     Pertinent Medications: MEDICATIONS  (STANDING):  albumin human 25% IVPB 100 milliLiter(s) IV Intermittent every 12 hours  atorvastatin 20 milliGRAM(s) Oral at bedtime  cefTRIAXone   IVPB 2000 milliGRAM(s) IV Intermittent <User Schedule>  cholecalciferol 1000 Unit(s) Oral daily  dextrose 5%. 1000 milliLiter(s) (50 mL/Hr) IV Continuous <Continuous>  dextrose 50% Injectable 12.5 Gram(s) IV Push once  dextrose 50% Injectable 25 Gram(s) IV Push once  dextrose 50% Injectable 25 Gram(s) IV Push once  furosemide   Injectable 40 milliGRAM(s) IV Push every 12 hours  influenza   Vaccine 0.5 milliLiter(s) IntraMuscular once  lactulose Syrup 30 Gram(s) Oral three times a day  lidocaine   Patch 2 Patch Transdermal daily  metolazone 2.5 milliGRAM(s) Oral daily  nystatin Ointment 1 Application(s) Topical two times a day  pantoprazole  Injectable 40 milliGRAM(s) IV Push two times a day  potassium chloride    Tablet ER 40 milliEquivalent(s) Oral daily  rifAXIMin 550 milliGRAM(s) Oral two times a day  saccharomyces boulardii 250 milliGRAM(s) Oral two times a day  senna 2 Tablet(s) Oral at bedtime  sucralfate 1 Gram(s) Oral every 6 hours  tamsulosin 0.4 milliGRAM(s) Oral at bedtime  ursodiol Capsule 300 milliGRAM(s) Oral three times a day    MEDICATIONS  (PRN):  dextrose 40% Gel 15 Gram(s) Oral once PRN Blood Glucose LESS THAN 70 milliGRAM(s)/deciliter  glucagon  Injectable 1 milliGRAM(s) IntraMuscular once PRN Glucose LESS THAN 70 milligrams/deciliter  ondansetron Injectable 4 milliGRAM(s) IV Push every 6 hours PRN Nausea and/or Vomiting    Pertinent Labs: CBC Full  -  ( 31 Oct 2019 07:41 )  WBC Count : 5.73 K/uL  RBC Count : 2.65 M/uL  Hemoglobin : 8.7 g/dL  Hematocrit : 26.4 %  Platelet Count - Automated : 67 K/uL  Mean Cell Volume : 99.6 fl  Mean Cell Hemoglobin : 32.8 pg  Mean Cell Hemoglobin Concentration : 33.0 gm/dL  Auto Neutrophil # : x  Auto Lymphocyte # : x  Auto Monocyte # : x  Auto Eosinophil # : x  Auto Basophil # : x  Auto Neutrophil % : x  Auto Lymphocyte % : x  Auto Monocyte % : x  Auto Eosinophil % : x  Auto Basophil % : x      10-31 Na136 mmol/L Glu 147 mg/dL<H> K+ 4.1 mmol/L Cr  1.11 mg/dL BUN 31.0 mg/dL<H> Phos n/a   Alb 3.1 g/dL<L> PAB n/a           Skin:     Nutrition focused physical exam previously conducted - found signs of malnutrition [ ]absent [x ]present    Subcutaneous fat loss: [x ] Orbital fat pads region, [x ]Buccal fat region, [ ]Triceps region,  [ ]Ribs region    Muscle wasting: [x ]Temples region, [ ]Clavicle region, [ ]Shoulder region, [ ]Scapula region, [ ]Interosseous region,  [ ]thigh region, [ ]Calf region    Estimated Needs:   [x ] no change since previous assessment  [ ] recalculated:     Current Nutrition Diagnosis: Malnutrition moderate chronic related to inability to meet sufficient protein-calorie needs 2/2 persistent lack of appetite in setting of Cirrhosis, GERD, GIB as evidenced meeting <75% estimated needs >1 mo, severe fluid accumulation. Pt continues to have poor po intake.  Plan is SANTANA with insurance auth pending. Stomach distention and 2+ pedel edema in b/l extremities.      Recommendations: Rec Glucerna shake BID    Monitoring and Evaluation:   [x ] PO intake [x ] Tolerance to diet prescription [X] Weights  [X] Follow up per protocol [X] Labs:

## 2019-10-31 NOTE — PROGRESS NOTE ADULT - ASSESSMENT
63y man with cirrhosis presented with JEAN cirrhosis, ascites and LE edema. No active gi bleeding. Hgb stable. MELD down to 23.   PPI PO BID   cont diuretics, monitor CR  monitor I's and O's  Hepatology transplant referral as an out patient    Thanks

## 2019-10-31 NOTE — PROGRESS NOTE ADULT - ASSESSMENT
MISA:  Concepcion+<30 c/w pre renal etiology (Hepatic cirrhosis)  (+)ATN from hypotension/sepsis==> resolving  Anasarca and pleural effusions; Abd distension less  - cont Lasix for now and SPA and Metolazone  with good results   If creat/BUN rises, then will have to lower the dose  _ labs am

## 2019-10-31 NOTE — PROGRESS NOTE ADULT - SUBJECTIVE AND OBJECTIVE BOX
Patient seen and examined    I&O's Summary excellent UO /diuresis - bottle full  Feels sl better  has less swelling    REVIEW OF SYSTEMS:    CONSTITUTIONAL: No F/C  RESPIRATORY: No cough,  No SOB  CARDIOVASCULAR: No CP/palpitations,    GASTROINTESTINAL: No abdominal pain  or NVD but distended  GENITOURINARY: No UTI sx  NEUROLOGICAL: No headaches, NO wk/numbness  MUSCULOSKELETAL:   EXTREMITIES : Mod/severe swelling,    Vital Signs Last 24 Hrs  T(C): 36.6 (10-31-19 @ 08:46), Max: 36.8 (10-30-19 @ 17:09)  T(F): 97.9 (10-31-19 @ 08:46), Max: 98.2 (10-30-19 @ 17:09)  HR: 80 (10-31-19 @ 08:46) (80 - 82)  BP: 120/54 (10-31-19 @ 08:46) (111/63 - 120/54)  BP(mean): --  RR: 18 (10-31-19 @ 08:46) (18 - 20)  SpO2: 84% (10-31-19 @ 08:46) (84% - 93%))    PHYSICAL EXAM:    GENERAL: NAD,   EYES:  conjunctiva and sclera clear  NECK: Supple, No JVD/Bruit  NERVOUS SYSTEM:  A/O x3,   CHEST:  No rales or rhonchi  HEART:  RRR, No murmur  ABDOMEN: Soft, NT/ND BS+  EXTREMITIES:  2+ LE Edema and scrotal edema but softer - improving  SKIN: No rashes    LABS:                          8.7    6.04  )-----------( 73       ( 30 Oct 2019 07:42 )             26.3     10-30    137  |  98  |  33.0<H>  ----------------------------<  135<H>  3.9   |  29.0  |  1.06    Ca    8.8      30 Oct 2019 07:42    10-31    136  |  96<L>  |  31.0<H>  ----------------------------<  147<H>  4.1   |  28.0  |  1.11    Ca    9.0      31 Oct 2019 07:41  Mg     1.7     10-31    TPro  5.5<L>  /  Alb  3.1<L>  /  TBili  4.9<H>  /  DBili  x   /  AST  60<H>  /  ALT  34  /  AlkPhos  95  10-31      MEDICATIONS  (STANDING):  albumin human 25% IVPB  atorvastatin  cefTRIAXone   IVPB  cholecalciferol  dextrose 40% Gel PRN  dextrose 5%.  dextrose 50% Injectable  dextrose 50% Injectable  dextrose 50% Injectable  furosemide   Injectable  glucagon  Injectable PRN  influenza   Vaccine  lactulose Syrup  lidocaine   Patch  metolazone  nystatin Ointment  ondansetron Injectable PRN  pantoprazole  Injectable  potassium chloride    Tablet ER  rifAXIMin  saccharomyces boulardii  senna  sucralfate  tamsulosin  ursodiol Capsule

## 2019-10-31 NOTE — PROGRESS NOTE ADULT - ASSESSMENT
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 He was found to be hypoglycemic ~29 and had a TMax ~102.6. He was fluid resuscitated, given Abx Zosyn/ Vanco and also one amp D50%, repeat FS ~ 69 for which he received another amp D50. Lactate was 4.1 ICU consulted for fever, persistent hypoglycemia and hypotension. Patient takes Lantus 40U and Tradjenta 5mg at home which he was compliant with; but he has not been eating well. In the ED, he had a diagnostic paracentesis ~ 20cc bloody fluid was drained. Fluid cultures were negative. Found to have gram positive bacteremia and sepsis.  Downgraded to the hospitalist service for further management. CT abdomen and pelvis consistent with hepatomegaly and cirrhosis. GI was consulted; Hepatitis panel negative.     Assessment/Plan:    1. Group B bacteremia: Source unclear  Repeat blood cultures on 10/23 were negative  IV rocephin until 11/6/19  LUE midline in place     2. Cirrhosis likely secondary to NAFLD: IV lasix for diuresis  Added aldactone and metaloxzone; may need UF if edema does not improve with medical  therapy  ON lactulose TID    3. MISA secondary to volume overload: Improved with diuresis   Renal ultrasound was normal  Nephrology following  4. Anemia: suspected secondary to GI bleed: HB/Hct stable with no further episodes of bleeding  Spoke with GI no need for EGD at this time unless clinical condition changes  Monitor hb/hct     5. Acute urinary retention: [place] was discontinued on 10/28  Voiding well  Primafit in place   On flomax     6. Thrombocytopenia likely secondary to cirrhosis     DVT PPE SCDs- thrombocytopenia and bleeding risk     PT; Stephanie on discharge when medically stable     Requested patient be ambulated out of bed to a chair

## 2019-11-01 LAB
ANION GAP SERPL CALC-SCNC: 13 MMOL/L — SIGNIFICANT CHANGE UP (ref 5–17)
BUN SERPL-MCNC: 30 MG/DL — HIGH (ref 8–20)
CALCIUM SERPL-MCNC: 9.5 MG/DL — SIGNIFICANT CHANGE UP (ref 8.6–10.2)
CHLORIDE SERPL-SCNC: 94 MMOL/L — LOW (ref 98–107)
CO2 SERPL-SCNC: 29 MMOL/L — SIGNIFICANT CHANGE UP (ref 22–29)
CREAT SERPL-MCNC: 1 MG/DL — SIGNIFICANT CHANGE UP (ref 0.5–1.3)
GLUCOSE BLDC GLUCOMTR-MCNC: 163 MG/DL — HIGH (ref 70–99)
GLUCOSE BLDC GLUCOMTR-MCNC: 165 MG/DL — HIGH (ref 70–99)
GLUCOSE BLDC GLUCOMTR-MCNC: 165 MG/DL — HIGH (ref 70–99)
GLUCOSE BLDC GLUCOMTR-MCNC: 189 MG/DL — HIGH (ref 70–99)
GLUCOSE BLDC GLUCOMTR-MCNC: 189 MG/DL — HIGH (ref 70–99)
GLUCOSE BLDC GLUCOMTR-MCNC: 195 MG/DL — HIGH (ref 70–99)
GLUCOSE SERPL-MCNC: 165 MG/DL — HIGH (ref 70–115)
HCT VFR BLD CALC: 26.3 % — LOW (ref 39–50)
HGB BLD-MCNC: 8.7 G/DL — LOW (ref 13–17)
MCHC RBC-ENTMCNC: 33.1 GM/DL — SIGNIFICANT CHANGE UP (ref 32–36)
MCHC RBC-ENTMCNC: 33.1 PG — SIGNIFICANT CHANGE UP (ref 27–34)
MCV RBC AUTO: 100 FL — SIGNIFICANT CHANGE UP (ref 80–100)
PLATELET # BLD AUTO: 62 K/UL — LOW (ref 150–400)
POTASSIUM SERPL-MCNC: 3.6 MMOL/L — SIGNIFICANT CHANGE UP (ref 3.5–5.3)
POTASSIUM SERPL-SCNC: 3.6 MMOL/L — SIGNIFICANT CHANGE UP (ref 3.5–5.3)
RBC # BLD: 2.63 M/UL — LOW (ref 4.2–5.8)
RBC # FLD: 19.4 % — HIGH (ref 10.3–14.5)
SODIUM SERPL-SCNC: 136 MMOL/L — SIGNIFICANT CHANGE UP (ref 135–145)
WBC # BLD: 5.68 K/UL — SIGNIFICANT CHANGE UP (ref 3.8–10.5)
WBC # FLD AUTO: 5.68 K/UL — SIGNIFICANT CHANGE UP (ref 3.8–10.5)

## 2019-11-01 PROCEDURE — 99233 SBSQ HOSP IP/OBS HIGH 50: CPT

## 2019-11-01 RX ADMIN — Medication 50 MILLILITER(S): at 06:45

## 2019-11-01 RX ADMIN — Medication 1 GRAM(S): at 12:04

## 2019-11-01 RX ADMIN — Medication 1: at 06:53

## 2019-11-01 RX ADMIN — NYSTATIN CREAM 1 APPLICATION(S): 100000 CREAM TOPICAL at 05:33

## 2019-11-01 RX ADMIN — URSODIOL 300 MILLIGRAM(S): 250 TABLET, FILM COATED ORAL at 22:05

## 2019-11-01 RX ADMIN — Medication 40 MILLIGRAM(S): at 18:18

## 2019-11-01 RX ADMIN — Medication 250 MILLIGRAM(S): at 05:32

## 2019-11-01 RX ADMIN — LIDOCAINE 2 PATCH: 4 CREAM TOPICAL at 01:08

## 2019-11-01 RX ADMIN — Medication 1: at 12:11

## 2019-11-01 RX ADMIN — URSODIOL 300 MILLIGRAM(S): 250 TABLET, FILM COATED ORAL at 05:32

## 2019-11-01 RX ADMIN — TAMSULOSIN HYDROCHLORIDE 0.4 MILLIGRAM(S): 0.4 CAPSULE ORAL at 22:05

## 2019-11-01 RX ADMIN — SENNA PLUS 2 TABLET(S): 8.6 TABLET ORAL at 22:05

## 2019-11-01 RX ADMIN — LIDOCAINE 2 PATCH: 4 CREAM TOPICAL at 19:00

## 2019-11-01 RX ADMIN — Medication 40 MILLIGRAM(S): at 05:31

## 2019-11-01 RX ADMIN — LACTULOSE 30 GRAM(S): 10 SOLUTION ORAL at 22:04

## 2019-11-01 RX ADMIN — PANTOPRAZOLE SODIUM 40 MILLIGRAM(S): 20 TABLET, DELAYED RELEASE ORAL at 05:33

## 2019-11-01 RX ADMIN — CEFTRIAXONE 100 MILLIGRAM(S): 500 INJECTION, POWDER, FOR SOLUTION INTRAMUSCULAR; INTRAVENOUS at 12:09

## 2019-11-01 RX ADMIN — LACTULOSE 30 GRAM(S): 10 SOLUTION ORAL at 05:32

## 2019-11-01 RX ADMIN — PANTOPRAZOLE SODIUM 40 MILLIGRAM(S): 20 TABLET, DELAYED RELEASE ORAL at 18:29

## 2019-11-01 RX ADMIN — Medication 1: at 18:17

## 2019-11-01 RX ADMIN — URSODIOL 300 MILLIGRAM(S): 250 TABLET, FILM COATED ORAL at 13:41

## 2019-11-01 RX ADMIN — Medication 1 GRAM(S): at 18:21

## 2019-11-01 RX ADMIN — Medication 40 MILLIEQUIVALENT(S): at 12:05

## 2019-11-01 RX ADMIN — NYSTATIN CREAM 1 APPLICATION(S): 100000 CREAM TOPICAL at 18:16

## 2019-11-01 RX ADMIN — Medication 250 MILLIGRAM(S): at 18:23

## 2019-11-01 RX ADMIN — Medication 1000 UNIT(S): at 12:05

## 2019-11-01 RX ADMIN — LIDOCAINE 2 PATCH: 4 CREAM TOPICAL at 12:10

## 2019-11-01 RX ADMIN — ATORVASTATIN CALCIUM 20 MILLIGRAM(S): 80 TABLET, FILM COATED ORAL at 22:05

## 2019-11-01 RX ADMIN — Medication 1 GRAM(S): at 05:32

## 2019-11-01 RX ADMIN — SPIRONOLACTONE 25 MILLIGRAM(S): 25 TABLET, FILM COATED ORAL at 05:33

## 2019-11-01 RX ADMIN — Medication 50 MILLILITER(S): at 18:14

## 2019-11-01 NOTE — PROGRESS NOTE ADULT - SUBJECTIVE AND OBJECTIVE BOX
CC: Follow up    INTERVAL HPI/OVERNIGHT EVENTS: Patient seen and examined, no acute complaints overnight. + BM overnight.       Vital Signs Last 24 Hrs  T(C): 36.8 (01 Nov 2019 08:34), Max: 36.8 (01 Nov 2019 08:34)  T(F): 98.3 (01 Nov 2019 08:34), Max: 98.3 (01 Nov 2019 08:34)  HR: 83 (01 Nov 2019 08:34) (83 - 86)  BP: 128/64 (01 Nov 2019 08:34) (113/55 - 128/64)  BP(mean): --  RR: 18 (01 Nov 2019 08:34) (18 - 18)  SpO2: 94% (31 Oct 2019 23:19) (92% - 94%)    PHYSICAL EXAM:    GENERAL: NAD, AOX3 obese  HEAD:  Atraumatic, Normocephalic  EYES: EOMI, PERRLA, conjunctiva and sclera clear  ENMT: Moist mucous membranes  CHEST/LUNG: Clear to auscultation bilaterally; No rales, rhonchi, wheezing, or rubs  HEART: Regular rate and rhythm; No murmurs, rubs, or gallops  ABDOMEN: Soft, Nontender, + Distended; Bowel sounds present  + Scrotal edema  EXTREMITIES:  2+ Peripheral Pulses, No clubbing, cyanosis  2+ pedal edema bilaterally        MEDICATIONS  (STANDING):  albumin human 25% IVPB 100 milliLiter(s) IV Intermittent every 12 hours  atorvastatin 20 milliGRAM(s) Oral at bedtime  cefTRIAXone   IVPB 2000 milliGRAM(s) IV Intermittent <User Schedule>  cholecalciferol 1000 Unit(s) Oral daily  dextrose 5%. 1000 milliLiter(s) (50 mL/Hr) IV Continuous <Continuous>  dextrose 50% Injectable 12.5 Gram(s) IV Push once  dextrose 50% Injectable 25 Gram(s) IV Push once  dextrose 50% Injectable 25 Gram(s) IV Push once  furosemide   Injectable 40 milliGRAM(s) IV Push every 12 hours  influenza   Vaccine 0.5 milliLiter(s) IntraMuscular once  insulin lispro (HumaLOG) corrective regimen sliding scale   SubCutaneous three times a day before meals  lactulose Syrup 30 Gram(s) Oral three times a day  lidocaine   Patch 2 Patch Transdermal daily  metolazone 2.5 milliGRAM(s) Oral daily  nystatin Ointment 1 Application(s) Topical two times a day  pantoprazole  Injectable 40 milliGRAM(s) IV Push two times a day  potassium chloride    Tablet ER 40 milliEquivalent(s) Oral daily  rifAXIMin 550 milliGRAM(s) Oral two times a day  saccharomyces boulardii 250 milliGRAM(s) Oral two times a day  senna 2 Tablet(s) Oral at bedtime  spironolactone 25 milliGRAM(s) Oral daily  sucralfate 1 Gram(s) Oral every 6 hours  tamsulosin 0.4 milliGRAM(s) Oral at bedtime  ursodiol Capsule 300 milliGRAM(s) Oral three times a day    MEDICATIONS  (PRN):  dextrose 40% Gel 15 Gram(s) Oral once PRN Blood Glucose LESS THAN 70 milliGRAM(s)/deciliter  glucagon  Injectable 1 milliGRAM(s) IntraMuscular once PRN Glucose LESS THAN 70 milligrams/deciliter  ondansetron Injectable 4 milliGRAM(s) IV Push every 6 hours PRN Nausea and/or Vomiting      Allergies    codeine (Anaphylaxis)    Intolerances    NO  RED MEAT (Unknown)        LABS:                          8.7    5.68  )-----------( 62       ( 01 Nov 2019 07:54 )             26.3     11-01    136  |  94<L>  |  30.0<H>  ----------------------------<  165<H>  3.6   |  29.0  |  1.00    Ca    9.5      01 Nov 2019 07:54  Mg     1.7     10-31    TPro  5.5<L>  /  Alb  3.1<L>  /  TBili  4.9<H>  /  DBili  x   /  AST  60<H>  /  ALT  34  /  AlkPhos  95  10-31    PT/INR - ( 31 Oct 2019 07:41 )   PT: 25.4 sec;   INR: 2.16 ratio               RADIOLOGY & ADDITIONAL TESTS:

## 2019-11-01 NOTE — PROGRESS NOTE ADULT - SUBJECTIVE AND OBJECTIVE BOX
INTERVAL HPI/OVERNIGHT EVENTS:  Good urine output. Had a dark brown BM this am.  No new complaints.     ROS wnl     PAST MEDICAL & SURGICAL HISTORY:  GIB (gastrointestinal bleeding)  GERD with esophagitis: Gastritis &amp; Non Bleeding Ulcers  Hepatic encephalopathy  Obesity  Fatty liver disease, nonalcoholic  Renal stones: 25 years ago  Hypertension  Neuropathy  Hypercholesteremia  Diabetes  S/P cholecystectomy      Home Medications:  aluminum hydroxide-magnesium hydroxide 200 mg-200 mg/5 mL oral suspension: 30 milliliter(s) orally every 4 hours, As needed, Dyspepsia (09 Oct 2019 12:34)  furosemide 20 mg oral tablet: 2 tab(s) orally once a day   (12 Oct 2019 10:51)  gabapentin 100 mg oral capsule: 2 cap(s) orally 4 times a day (09 Oct 2019 12:34)  insulin glargine: 40 unit(s) subcutaneous once a day (12 Oct 2019 10:51)  phytonadione 100 mcg oral tablet: 1 tab(s) orally once a day (09 Oct 2019 12:34)  pravastatin 40 mg oral tablet: 1 tab(s) orally once a day (09 Oct 2019 12:34)  rifAXIMin 550 mg oral tablet: 1 tab(s) orally 2 times a day (09 Oct 2019 12:34)  Vitamin D3 2000 intl units oral tablet: 1 tab(s) orally once a day (09 Oct 2019 12:34)      MEDICATIONS  (STANDING):  albumin human 25% IVPB 100 milliLiter(s) IV Intermittent every 12 hours  atorvastatin 20 milliGRAM(s) Oral at bedtime  cefTRIAXone   IVPB 2000 milliGRAM(s) IV Intermittent <User Schedule>  cholecalciferol 1000 Unit(s) Oral daily  dextrose 5%. 1000 milliLiter(s) (50 mL/Hr) IV Continuous <Continuous>  dextrose 50% Injectable 12.5 Gram(s) IV Push once  dextrose 50% Injectable 25 Gram(s) IV Push once  dextrose 50% Injectable 25 Gram(s) IV Push once  furosemide   Injectable 40 milliGRAM(s) IV Push every 12 hours  influenza   Vaccine 0.5 milliLiter(s) IntraMuscular once  insulin lispro (HumaLOG) corrective regimen sliding scale   SubCutaneous three times a day before meals  lactulose Syrup 30 Gram(s) Oral three times a day  lidocaine   Patch 2 Patch Transdermal daily  metolazone 2.5 milliGRAM(s) Oral daily  nystatin Ointment 1 Application(s) Topical two times a day  pantoprazole  Injectable 40 milliGRAM(s) IV Push two times a day  potassium chloride    Tablet ER 40 milliEquivalent(s) Oral daily  rifAXIMin 550 milliGRAM(s) Oral two times a day  saccharomyces boulardii 250 milliGRAM(s) Oral two times a day  senna 2 Tablet(s) Oral at bedtime  spironolactone 25 milliGRAM(s) Oral daily  sucralfate 1 Gram(s) Oral every 6 hours  tamsulosin 0.4 milliGRAM(s) Oral at bedtime  ursodiol Capsule 300 milliGRAM(s) Oral three times a day    MEDICATIONS  (PRN):  dextrose 40% Gel 15 Gram(s) Oral once PRN Blood Glucose LESS THAN 70 milliGRAM(s)/deciliter  glucagon  Injectable 1 milliGRAM(s) IntraMuscular once PRN Glucose LESS THAN 70 milligrams/deciliter  ondansetron Injectable 4 milliGRAM(s) IV Push every 6 hours PRN Nausea and/or Vomiting      Allergies    codeine (Anaphylaxis)    Intolerances    NO  RED MEAT (Unknown)        PHYSICAL EXAM:   Vital Signs:  Vital Signs Last 24 Hrs  T(C): 36.6 (31 Oct 2019 23:19), Max: 36.7 (31 Oct 2019 17:12)  T(F): 97.9 (31 Oct 2019 23:19), Max: 98 (31 Oct 2019 17:12)  HR: 86 (31 Oct 2019 23:19) (80 - 86)  BP: 113/55 (31 Oct 2019 23:19) (113/55 - 120/67)  BP(mean): --  RR: 18 (31 Oct 2019 23:19) (18 - 18)  SpO2: 94% (31 Oct 2019 23:19) (84% - 94%)  Daily     Daily     GENERAL:  no distress  HEENT:  NC/AT,  anicteric  ABDOMEN:  Soft, non-tender, non-distended, normoactive bowel sounds,  no masses   EXTREMITIES  no cyanosis      LABS:                        8.7    5.68  )-----------( x        ( 01 Nov 2019 07:54 )             26.3       Hemoglobin: 8.7 g/dL (11-01-19 @ 07:54)  Hemoglobin: 8.7 g/dL (10-31-19 @ 07:41)  Hemoglobin: 8.7 g/dL (10-30-19 @ 07:42)      10-31    136  |  96<L>  |  31.0<H>  ----------------------------<  147<H>  4.1   |  28.0  |  1.11    Ca    9.0      31 Oct 2019 07:41  Mg     1.7     10-31    TPro  5.5<L>  /  Alb  3.1<L>  /  TBili  4.9<H>  /  DBili  x   /  AST  60<H>  /  ALT  34  /  AlkPhos  95  10-31      INR: 2.16 ratio (10-31-19 @ 07:41)      Alanine Aminotransferase (ALT/SGPT): 34 U/L (10-31-19 @ 07:41)  Aspartate Aminotransferase (AST/SGOT): 60 U/L (10-31-19 @ 07:41)  Alkaline Phosphatase, Serum: 95 U/L (10-31-19 @ 07:41)    RADIOLOGY & ADDITIONAL TESTS:  < from: CT Abdomen and Pelvis No Cont (10.23.19 @ 09:00) >  FINDINGS:    LOWER CHEST: Small bilateral pleural effusions which were have increased   and bibasilar atelectasis.    LIVER: Slightly nodular in contour with a prominent left lobe raising   concern for cirrhosis. Please correlate clinically.  BILE DUCTS: Normal caliber.  GALLBLADDER: Status post cholecystectomy  SPLEEN: Mildly prominent measuring up to 13.3 cm. This may reflect portal   hypertension. Please correlate clinically.  PANCREAS: Within normal limits.  ADRENALS: Within normal limits.  KIDNEYS/URETERS: 4 mm nonobstructing stone in the lower pole of the left   kidney.    BLADDER: Collapsed and contains a Beasley catheter.  REPRODUCTIVE ORGANS: Grossly unremarkable.    BOWEL: No bowel obstruction. Appendix unremarkable.  PERITONEUM: Mild to moderate ascites is unchanged.  VESSELS: Varices. Large varices are seen to the left of the abdominal   aorta  RETROPERITONEUM/LYMPH NODES: No lymphadenopathy.    ABDOMINAL WALL: Subcutaneous edema.  BONES: Degenerative changes of bone.    IMPRESSION:     Findings raising concern for cirrhosis. Mildly prominent spleen and   varices may reflect portal hypertension. Please correlate clinically..   Ascites which has not significantly changed. Small bilateral pleural   effusions has increased.    < end of copied text >

## 2019-11-01 NOTE — PROGRESS NOTE ADULT - SUBJECTIVE AND OBJECTIVE BOX
NEPHROLOGY INTERVAL HPI/OVERNIGHT EVENTS:    Examined  Edmatous but improved    MEDICATIONS  (STANDING):  albumin human 25% IVPB 100 milliLiter(s) IV Intermittent every 12 hours  atorvastatin 20 milliGRAM(s) Oral at bedtime  cefTRIAXone   IVPB 2000 milliGRAM(s) IV Intermittent <User Schedule>  cholecalciferol 1000 Unit(s) Oral daily  dextrose 5%. 1000 milliLiter(s) (50 mL/Hr) IV Continuous <Continuous>  dextrose 50% Injectable 12.5 Gram(s) IV Push once  dextrose 50% Injectable 25 Gram(s) IV Push once  dextrose 50% Injectable 25 Gram(s) IV Push once  furosemide   Injectable 40 milliGRAM(s) IV Push every 12 hours  influenza   Vaccine 0.5 milliLiter(s) IntraMuscular once  insulin lispro (HumaLOG) corrective regimen sliding scale   SubCutaneous three times a day before meals  lactulose Syrup 30 Gram(s) Oral three times a day  lidocaine   Patch 2 Patch Transdermal daily  metolazone 2.5 milliGRAM(s) Oral daily  nystatin Ointment 1 Application(s) Topical two times a day  pantoprazole  Injectable 40 milliGRAM(s) IV Push two times a day  potassium chloride    Tablet ER 40 milliEquivalent(s) Oral daily  rifAXIMin 550 milliGRAM(s) Oral two times a day  saccharomyces boulardii 250 milliGRAM(s) Oral two times a day  senna 2 Tablet(s) Oral at bedtime  spironolactone 25 milliGRAM(s) Oral daily  sucralfate 1 Gram(s) Oral every 6 hours  tamsulosin 0.4 milliGRAM(s) Oral at bedtime  ursodiol Capsule 300 milliGRAM(s) Oral three times a day    MEDICATIONS  (PRN):  dextrose 40% Gel 15 Gram(s) Oral once PRN Blood Glucose LESS THAN 70 milliGRAM(s)/deciliter  glucagon  Injectable 1 milliGRAM(s) IntraMuscular once PRN Glucose LESS THAN 70 milligrams/deciliter  ondansetron Injectable 4 milliGRAM(s) IV Push every 6 hours PRN Nausea and/or Vomiting      Allergies    codeine (Anaphylaxis)    Intolerances    NO  RED MEAT (Unknown)      Vital Signs Last 24 Hrs  T(C): 36.8 (01 Nov 2019 08:34), Max: 36.8 (01 Nov 2019 08:34)  T(F): 98.3 (01 Nov 2019 08:34), Max: 98.3 (01 Nov 2019 08:34)  HR: 83 (01 Nov 2019 08:34) (83 - 86)  BP: 128/64 (01 Nov 2019 08:34) (113/55 - 128/64)  BP(mean): --  RR: 18 (01 Nov 2019 08:34) (18 - 18)  SpO2: 94% (31 Oct 2019 23:19) (94% - 94%)  Daily     Daily     PHYSICAL EXAM:  GENERAL: Obese; chronically debilitated  EYES:  conjunctiva and sclera clear  NECK: Supple, No JVD/Carotid Bruit  NERVOUS SYSTEM:  A/O x3, no asterixis  Lungs: Diminished BS at bases   HEART:  No murmur,  No rub  ABDOMEN: Soft, NT/ND BS+; Ascites +  EXTREMITIES: + anasarca, scrotal edema  : scrotal edema    LABS:                        8.7    5.68  )-----------( 62       ( 01 Nov 2019 07:54 )             26.3     11-01    136  |  94<L>  |  30.0<H>  ----------------------------<  165<H>  3.6   |  29.0  |  1.00    Ca    9.5      01 Nov 2019 07:54  Mg     1.7     10-31    TPro  5.5<L>  /  Alb  3.1<L>  /  TBili  4.9<H>  /  DBili  x   /  AST  60<H>  /  ALT  34  /  AlkPhos  95  10-31    PT/INR - ( 31 Oct 2019 07:41 )   PT: 25.4 sec;   INR: 2.16 ratio                     RADIOLOGY & ADDITIONAL TESTS:

## 2019-11-01 NOTE — PROGRESS NOTE ADULT - ASSESSMENT
MISA:  Concepcion+<30 c/w pre renal etiology (Hepatic cirrhosis)  (+)ATN from hypotension/sepsis==> resolving  Anasarca and pleural effusions  - cont Lasix and metolazone for now and avoid overdiuresis  - awaiting 24 hr urine protein  - if will need to consider RRT and UF if medical management cannot sufficiently impact fluid overload    Will follow

## 2019-11-01 NOTE — PROGRESS NOTE ADULT - ASSESSMENT
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 He was found to be hypoglycemic ~29 and had a TMax ~102.6. He was fluid resuscitated, given Abx Zosyn/ Vanco and also one amp D50%, repeat FS ~ 69 for which he received another amp D50. Lactate was 4.1 ICU consulted for fever, persistent hypoglycemia and hypotension. Patient takes Lantus 40U and Tradjenta 5mg at home which he was compliant with; but he has not been eating well. In the ED, he had a diagnostic paracentesis ~ 20cc bloody fluid was drained. Fluid cultures were negative. Found to have gram positive bacteremia and sepsis.  Downgraded to the hospitalist service for further management. CT abdomen and pelvis consistent with hepatomegaly and cirrhosis. GI was consulted; Hepatitis panel negative.     Assessment/Plan:    1. Group B bacteremia: Source unclear  Repeat blood cultures on 10/23 were negative  IV rocephin until 11/6/19  LUE midline in place     2. Cirrhosis likely secondary to NAFLD: IV lasix for diuresis  Added aldactone and metaloxzone; may need UF if edema does not improve with medical  therapy  ON lactulose TID    3. MISA secondary to volume overload: Improved with diuresis   Renal ultrasound was normal  Nephrology following  4. Anemia: suspected secondary to GI bleed: HB/Hct stable with no further episodes of bleeding  Spoke with GI no need for EGD at this time unless clinical condition changes  Monitor hb/hct     5. Acute urinary retention: Skaggs was discontinued on 10/28  Voiding well  Primafit in place   On flomax     6. Thrombocytopenia likely secondary to cirrhosis     DVT PPE SCDs- thrombocytopenia and bleeding risk     PT; Stephanie on discharge when medically stable

## 2019-11-01 NOTE — PROGRESS NOTE ADULT - ASSESSMENT
63y man with cirrhosis presented with JEAN cirrhosis, ascites and LE edema. No active gi bleeding. Hgb stable. MELD 23.   cont lactulose and rifaxamin  PPI PO BID   cont diuretics as per renal, monitor CR  monitor I's and O's  Hepatology transplant referral as an out patient    Thanks

## 2019-11-02 LAB
ANION GAP SERPL CALC-SCNC: 12 MMOL/L — SIGNIFICANT CHANGE UP (ref 5–17)
BUN SERPL-MCNC: 32 MG/DL — HIGH (ref 8–20)
CALCIUM SERPL-MCNC: 9.6 MG/DL — SIGNIFICANT CHANGE UP (ref 8.6–10.2)
CHLORIDE SERPL-SCNC: 94 MMOL/L — LOW (ref 98–107)
CO2 SERPL-SCNC: 31 MMOL/L — HIGH (ref 22–29)
CREAT SERPL-MCNC: 1.09 MG/DL — SIGNIFICANT CHANGE UP (ref 0.5–1.3)
GLUCOSE BLDC GLUCOMTR-MCNC: 162 MG/DL — HIGH (ref 70–99)
GLUCOSE BLDC GLUCOMTR-MCNC: 165 MG/DL — HIGH (ref 70–99)
GLUCOSE BLDC GLUCOMTR-MCNC: 172 MG/DL — HIGH (ref 70–99)
GLUCOSE BLDC GLUCOMTR-MCNC: 187 MG/DL — HIGH (ref 70–99)
GLUCOSE SERPL-MCNC: 159 MG/DL — HIGH (ref 70–115)
HCT VFR BLD CALC: 27.6 % — LOW (ref 39–50)
HGB BLD-MCNC: 9.1 G/DL — LOW (ref 13–17)
MCHC RBC-ENTMCNC: 32.6 PG — SIGNIFICANT CHANGE UP (ref 27–34)
MCHC RBC-ENTMCNC: 33 GM/DL — SIGNIFICANT CHANGE UP (ref 32–36)
MCV RBC AUTO: 98.9 FL — SIGNIFICANT CHANGE UP (ref 80–100)
PLATELET # BLD AUTO: 64 K/UL — LOW (ref 150–400)
POTASSIUM SERPL-MCNC: 3.7 MMOL/L — SIGNIFICANT CHANGE UP (ref 3.5–5.3)
POTASSIUM SERPL-SCNC: 3.7 MMOL/L — SIGNIFICANT CHANGE UP (ref 3.5–5.3)
RBC # BLD: 2.79 M/UL — LOW (ref 4.2–5.8)
RBC # FLD: 19.7 % — HIGH (ref 10.3–14.5)
SODIUM SERPL-SCNC: 137 MMOL/L — SIGNIFICANT CHANGE UP (ref 135–145)
WBC # BLD: 6.28 K/UL — SIGNIFICANT CHANGE UP (ref 3.8–10.5)
WBC # FLD AUTO: 6.28 K/UL — SIGNIFICANT CHANGE UP (ref 3.8–10.5)

## 2019-11-02 PROCEDURE — 99233 SBSQ HOSP IP/OBS HIGH 50: CPT

## 2019-11-02 RX ORDER — LACTULOSE 10 G/15ML
15 SOLUTION ORAL THREE TIMES A DAY
Refills: 0 | Status: DISCONTINUED | OUTPATIENT
Start: 2019-11-02 | End: 2019-11-05

## 2019-11-02 RX ADMIN — Medication 50 MILLILITER(S): at 07:37

## 2019-11-02 RX ADMIN — Medication 40 MILLIEQUIVALENT(S): at 12:43

## 2019-11-02 RX ADMIN — TAMSULOSIN HYDROCHLORIDE 0.4 MILLIGRAM(S): 0.4 CAPSULE ORAL at 21:47

## 2019-11-02 RX ADMIN — Medication 1 GRAM(S): at 01:36

## 2019-11-02 RX ADMIN — NYSTATIN CREAM 1 APPLICATION(S): 100000 CREAM TOPICAL at 17:30

## 2019-11-02 RX ADMIN — Medication 1 GRAM(S): at 17:32

## 2019-11-02 RX ADMIN — SPIRONOLACTONE 25 MILLIGRAM(S): 25 TABLET, FILM COATED ORAL at 06:23

## 2019-11-02 RX ADMIN — PANTOPRAZOLE SODIUM 40 MILLIGRAM(S): 20 TABLET, DELAYED RELEASE ORAL at 06:24

## 2019-11-02 RX ADMIN — Medication 1: at 17:32

## 2019-11-02 RX ADMIN — ATORVASTATIN CALCIUM 20 MILLIGRAM(S): 80 TABLET, FILM COATED ORAL at 21:47

## 2019-11-02 RX ADMIN — PANTOPRAZOLE SODIUM 40 MILLIGRAM(S): 20 TABLET, DELAYED RELEASE ORAL at 17:31

## 2019-11-02 RX ADMIN — LIDOCAINE 2 PATCH: 4 CREAM TOPICAL at 12:43

## 2019-11-02 RX ADMIN — LACTULOSE 30 GRAM(S): 10 SOLUTION ORAL at 06:23

## 2019-11-02 RX ADMIN — Medication 40 MILLIGRAM(S): at 17:32

## 2019-11-02 RX ADMIN — Medication 250 MILLIGRAM(S): at 06:24

## 2019-11-02 RX ADMIN — LIDOCAINE 2 PATCH: 4 CREAM TOPICAL at 19:46

## 2019-11-02 RX ADMIN — LIDOCAINE 2 PATCH: 4 CREAM TOPICAL at 01:00

## 2019-11-02 RX ADMIN — CEFTRIAXONE 100 MILLIGRAM(S): 500 INJECTION, POWDER, FOR SOLUTION INTRAMUSCULAR; INTRAVENOUS at 12:42

## 2019-11-02 RX ADMIN — Medication 40 MILLIGRAM(S): at 06:23

## 2019-11-02 RX ADMIN — URSODIOL 300 MILLIGRAM(S): 250 TABLET, FILM COATED ORAL at 17:32

## 2019-11-02 RX ADMIN — Medication 1000 UNIT(S): at 12:43

## 2019-11-02 RX ADMIN — URSODIOL 300 MILLIGRAM(S): 250 TABLET, FILM COATED ORAL at 06:23

## 2019-11-02 RX ADMIN — Medication 1 GRAM(S): at 06:23

## 2019-11-02 RX ADMIN — URSODIOL 300 MILLIGRAM(S): 250 TABLET, FILM COATED ORAL at 21:47

## 2019-11-02 RX ADMIN — Medication 1 GRAM(S): at 12:44

## 2019-11-02 RX ADMIN — Medication 250 MILLIGRAM(S): at 17:32

## 2019-11-02 RX ADMIN — NYSTATIN CREAM 1 APPLICATION(S): 100000 CREAM TOPICAL at 06:23

## 2019-11-02 RX ADMIN — Medication 1: at 12:21

## 2019-11-02 RX ADMIN — Medication 1 GRAM(S): at 23:32

## 2019-11-02 NOTE — PROGRESS NOTE ADULT - SUBJECTIVE AND OBJECTIVE BOX
NEPHROLOGY INTERVAL HPI/OVERNIGHT EVENTS:  pt comfortable now  no acute distress  appetite still poor    MEDICATIONS  (STANDING):  atorvastatin 20 milliGRAM(s) Oral at bedtime  cefTRIAXone   IVPB 2000 milliGRAM(s) IV Intermittent <User Schedule>  cholecalciferol 1000 Unit(s) Oral daily  dextrose 5%. 1000 milliLiter(s) (50 mL/Hr) IV Continuous <Continuous>  dextrose 50% Injectable 12.5 Gram(s) IV Push once  dextrose 50% Injectable 25 Gram(s) IV Push once  dextrose 50% Injectable 25 Gram(s) IV Push once  furosemide   Injectable 40 milliGRAM(s) IV Push every 12 hours  influenza   Vaccine 0.5 milliLiter(s) IntraMuscular once  insulin lispro (HumaLOG) corrective regimen sliding scale   SubCutaneous three times a day before meals  lactulose Syrup 30 Gram(s) Oral three times a day  lidocaine   Patch 2 Patch Transdermal daily  metolazone 2.5 milliGRAM(s) Oral daily  nystatin Ointment 1 Application(s) Topical two times a day  pantoprazole  Injectable 40 milliGRAM(s) IV Push two times a day  potassium chloride    Tablet ER 40 milliEquivalent(s) Oral daily  rifAXIMin 550 milliGRAM(s) Oral two times a day  saccharomyces boulardii 250 milliGRAM(s) Oral two times a day  senna 2 Tablet(s) Oral at bedtime  spironolactone 25 milliGRAM(s) Oral daily  sucralfate 1 Gram(s) Oral every 6 hours  tamsulosin 0.4 milliGRAM(s) Oral at bedtime  ursodiol Capsule 300 milliGRAM(s) Oral three times a day    MEDICATIONS  (PRN):  dextrose 40% Gel 15 Gram(s) Oral once PRN Blood Glucose LESS THAN 70 milliGRAM(s)/deciliter  glucagon  Injectable 1 milliGRAM(s) IntraMuscular once PRN Glucose LESS THAN 70 milligrams/deciliter  ondansetron Injectable 4 milliGRAM(s) IV Push every 6 hours PRN Nausea and/or Vomiting      Allergies    codeine (Anaphylaxis)    Intolerances          Vital Signs Last 24 Hrs  T(C): 36.7 (02 Nov 2019 08:00), Max: 36.9 (01 Nov 2019 16:00)  T(F): 98 (02 Nov 2019 08:00), Max: 98.4 (01 Nov 2019 16:00)  HR: 79 (02 Nov 2019 08:00) (79 - 91)  BP: 117/65 (02 Nov 2019 08:00) (117/65 - 128/54)  BP(mean): --  RR: 18 (02 Nov 2019 08:00) (18 - 18)  SpO2: 92% (02 Nov 2019 08:00) (92% - 94%)    PHYSICAL EXAM:  GENERAL: Obese; chronically debilitated  EYES:  conjunctiva and sclera clear  NECK: Supple, No JVD/Carotid Bruit  NERVOUS SYSTEM:  A/O x3, no asterixis  Lungs: Diminished BS at bases   HEART:  No murmur,  No rub  ABDOMEN: Soft, NT/ND BS+; Ascites +  EXTREMITIES: + anasarca  : scrotal edema    LABS:                        9.1    6.28  )-----------( 64       ( 02 Nov 2019 08:19 )             27.6     11-02    137  |  94<L>  |  32.0<H>  ----------------------------<  159<H>  3.7   |  31.0<H>  |  1.09    Ca    9.6      02 Nov 2019 08:19              RADIOLOGY & ADDITIONAL TESTS:

## 2019-11-02 NOTE — PROGRESS NOTE ADULT - ASSESSMENT
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 He was found to be hypoglycemic ~29 and had a TMax ~102.6. He was fluid resuscitated, given Abx Zosyn/ Vanco and also one amp D50%, repeat FS ~ 69 for which he received another amp D50. Lactate was 4.1 ICU consulted for sepsis with fever, persistent hypoglycemia and hypotension. Patient takes Lantus 40U and Tradjenta 5mg at home which he was compliant with; but he has not been eating well. In the ED, he had a diagnostic paracentesis ~ 20cc bloody fluid was drained. Fluid cultures were negative. Found to have gram positive bacteremia and sepsis.  Downgraded to the hospitalist service for further management. CT abdomen and pelvis consistent with hepatomegaly and cirrhosis. ID was consulted, started on IV rocehin for Group B bacteremia for a total of 2 weeks with a midline.  GI was consulted; Hepatitis panel negative. Initially patient was anemia, planned for EGD. However Hb/hct stablized and patient had respiratory distress with elevated INR therefore plans for EGD were cancelled.  Started on IV diuresis for MISA secondary to volume overload.     Assessment/Plan:    1. Group B bacteremia: Source unclear  Repeat blood cultures on 10/23 were negative  IV rocephin until 11/6/19  LUE midline in place     2. Cirrhosis likely secondary to NAFLD: IV lasix for diuresis  Added aldactone and metaloxzone; may need UF if edema does not improve with medical  therapy  Decrease lactulose to 15ml TID given frequent loose stool     3. MISA secondary to volume overload: Improved with diuresis   Renal ultrasound was normal  Nephrology following    4. Anemia: suspected secondary to GI bleed: HB/Hct stable with no further episodes of bleeding  Spoke with GI no need for EGD at this time unless clinical condition changes  Monitor hb/hct     5. Acute urinary retention: [place] was discontinued on 10/28  Voiding well  Primafit in place   On flomax     6. Thrombocytopenia likely secondary to cirrhosis     DVT PPE SCDs- thrombocytopenia and bleeding risk     PT; Stephanie on discharge when medically stable

## 2019-11-02 NOTE — PROGRESS NOTE ADULT - SUBJECTIVE AND OBJECTIVE BOX
Chief Complaint: This is a 63y old Male patient being seen for follow-up consultation for cirrhosis.    HPI / 24H events:  Patient has no complaints.  Moving bowels with great frequency.  Very well oriented.    ROS: A 14-point review of systems was reviewed and was otherwise negative save what was reported in the HPI.    PAST MEDICAL/SURGICAL HISTORY:  GIB (gastrointestinal bleeding)  GERD with esophagitis: Gastritis &amp; Non Bleeding Ulcers  Hepatic encephalopathy  Obesity  Fatty liver disease, nonalcoholic  Renal stones: 25 years ago  Hypertension  Neuropathy  Hypercholesteremia  Diabetes  S/P cholecystectomy    MEDICATIONS  (STANDING):  atorvastatin 20 milliGRAM(s) Oral at bedtime  cefTRIAXone   IVPB 2000 milliGRAM(s) IV Intermittent <User Schedule>  cholecalciferol 1000 Unit(s) Oral daily  dextrose 5%. 1000 milliLiter(s) (50 mL/Hr) IV Continuous <Continuous>  dextrose 50% Injectable 12.5 Gram(s) IV Push once  dextrose 50% Injectable 25 Gram(s) IV Push once  dextrose 50% Injectable 25 Gram(s) IV Push once  furosemide   Injectable 40 milliGRAM(s) IV Push every 12 hours  influenza   Vaccine 0.5 milliLiter(s) IntraMuscular once  insulin lispro (HumaLOG) corrective regimen sliding scale   SubCutaneous three times a day before meals  lactulose Syrup 15 Gram(s) Oral three times a day  lidocaine   Patch 2 Patch Transdermal daily  metolazone 2.5 milliGRAM(s) Oral daily  nystatin Ointment 1 Application(s) Topical two times a day  pantoprazole  Injectable 40 milliGRAM(s) IV Push two times a day  potassium chloride    Tablet ER 40 milliEquivalent(s) Oral daily  rifAXIMin 550 milliGRAM(s) Oral two times a day  saccharomyces boulardii 250 milliGRAM(s) Oral two times a day  senna 2 Tablet(s) Oral at bedtime  spironolactone 25 milliGRAM(s) Oral daily  sucralfate 1 Gram(s) Oral every 6 hours  tamsulosin 0.4 milliGRAM(s) Oral at bedtime  ursodiol Capsule 300 milliGRAM(s) Oral three times a day    MEDICATIONS  (PRN):  dextrose 40% Gel 15 Gram(s) Oral once PRN Blood Glucose LESS THAN 70 milliGRAM(s)/deciliter  glucagon  Injectable 1 milliGRAM(s) IntraMuscular once PRN Glucose LESS THAN 70 milligrams/deciliter  ondansetron Injectable 4 milliGRAM(s) IV Push every 6 hours PRN Nausea and/or Vomiting    T(C): 36.7 (11-02-19 @ 16:17), Max: 36.8 (11-02-19 @ 00:10)  HR: 84 (11-02-19 @ 16:17) (79 - 91)  BP: 110/60 (11-02-19 @ 16:17) (110/60 - 128/54)  RR: 18 (11-02-19 @ 16:17) (18 - 18)  SpO2: 90% (11-02-19 @ 16:17) (90% - 92%)    I&O's Summary    01 Nov 2019 07:01  -  02 Nov 2019 07:00  --------------------------------------------------------  IN: 870 mL / OUT: 1950 mL / NET: -1080 mL    02 Nov 2019 08:01  -  02 Nov 2019 20:13  --------------------------------------------------------  IN: 0 mL / OUT: 750 mL / NET: -750 mL                              9.1    6.28  )-----------( 64       ( 02 Nov 2019 08:19 )             27.6     11-02    137  |  94<L>  |  32.0<H>  ----------------------------<  159<H>  3.7   |  31.0<H>  |  1.09    Ca    9.6      02 Nov 2019 08:19

## 2019-11-02 NOTE — PROGRESS NOTE ADULT - ASSESSMENT
63y man with cirrhosis presented with JEAN cirrhosis, ascites and LE edema. No active gi bleeding. Hgb stable.   - cont lactulose and rifaximin  - PPI PO BID   - cont diuretics as per renal, monitor CR  - monitor I's and O's  - Hepatology transplant referral as an out patient    Thank you for the consult.  Please do not hesitate to call with any questions or concerns.    PABLITO Schmitz MD  [facility]  Division of Gastroenterology  Tel [phone]  Fax [phone]  11-02-19 @ 20:16

## 2019-11-02 NOTE — PROGRESS NOTE ADULT - SUBJECTIVE AND OBJECTIVE BOX
CC: Follow up    INTERVAL HPI/OVERNIGHT EVENTS: Patient seen and examined, having 4-5 loose BM overnight. Denies abdominal pain nausea or vomiting.       Vital Signs Last 24 Hrs  T(C): 36.7 (02 Nov 2019 08:00), Max: 36.9 (01 Nov 2019 16:00)  T(F): 98 (02 Nov 2019 08:00), Max: 98.4 (01 Nov 2019 16:00)  HR: 79 (02 Nov 2019 08:00) (79 - 91)  BP: 117/65 (02 Nov 2019 08:00) (117/65 - 128/54)  BP(mean): --  RR: 18 (02 Nov 2019 08:00) (18 - 18)  SpO2: 92% (02 Nov 2019 08:00) (92% - 94%)    PHYSICAL EXAM:    GENERAL: NAD, AOX 3 obese  HEAD:  Atraumatic, Normocephalic  EYES: EOMI, PERRLA, conjunctiva and sclera clear  ENMT: Moist mucous membranes  CHEST/LUNG: Clear to auscultation bilaterally; No rales, rhonchi, wheezing, or rubs  HEART: Regular rate and rhythm; No murmurs, rubs, or gallops  ABDOMEN: Soft, Nontender, +distended; Bowel sounds present  Scrotal edema   EXTREMITIES:  2+ Peripheral Pulses, No clubbing, cyanosis,  + 2 pedal edema         MEDICATIONS  (STANDING):  atorvastatin 20 milliGRAM(s) Oral at bedtime  cefTRIAXone   IVPB 2000 milliGRAM(s) IV Intermittent <User Schedule>  cholecalciferol 1000 Unit(s) Oral daily  dextrose 5%. 1000 milliLiter(s) (50 mL/Hr) IV Continuous <Continuous>  dextrose 50% Injectable 12.5 Gram(s) IV Push once  dextrose 50% Injectable 25 Gram(s) IV Push once  dextrose 50% Injectable 25 Gram(s) IV Push once  furosemide   Injectable 40 milliGRAM(s) IV Push every 12 hours  influenza   Vaccine 0.5 milliLiter(s) IntraMuscular once  insulin lispro (HumaLOG) corrective regimen sliding scale   SubCutaneous three times a day before meals  lactulose Syrup 15 Gram(s) Oral three times a day  lidocaine   Patch 2 Patch Transdermal daily  metolazone 2.5 milliGRAM(s) Oral daily  nystatin Ointment 1 Application(s) Topical two times a day  pantoprazole  Injectable 40 milliGRAM(s) IV Push two times a day  potassium chloride    Tablet ER 40 milliEquivalent(s) Oral daily  rifAXIMin 550 milliGRAM(s) Oral two times a day  saccharomyces boulardii 250 milliGRAM(s) Oral two times a day  senna 2 Tablet(s) Oral at bedtime  spironolactone 25 milliGRAM(s) Oral daily  sucralfate 1 Gram(s) Oral every 6 hours  tamsulosin 0.4 milliGRAM(s) Oral at bedtime  ursodiol Capsule 300 milliGRAM(s) Oral three times a day    MEDICATIONS  (PRN):  dextrose 40% Gel 15 Gram(s) Oral once PRN Blood Glucose LESS THAN 70 milliGRAM(s)/deciliter  glucagon  Injectable 1 milliGRAM(s) IntraMuscular once PRN Glucose LESS THAN 70 milligrams/deciliter  ondansetron Injectable 4 milliGRAM(s) IV Push every 6 hours PRN Nausea and/or Vomiting      Allergies    codeine (Anaphylaxis)    Intolerances    NO  RED MEAT (Unknown)        LABS:                          9.1    6.28  )-----------( 64       ( 02 Nov 2019 08:19 )             27.6     11-02    137  |  94<L>  |  32.0<H>  ----------------------------<  159<H>  3.7   |  31.0<H>  |  1.09    Ca    9.6      02 Nov 2019 08:19            RADIOLOGY & ADDITIONAL TESTS:

## 2019-11-02 NOTE — PROGRESS NOTE ADULT - ASSESSMENT
MISA:  Concepcion+<30 c/w pre renal etiology (hepatic cirrhosis)  (+)ATN from hypotension/sepsis==> improved  Anasarca and pleural effusions  - cont Lasix for now and avoid overdiuresis  - despite aggressive diuresis pt remains significantly fluid overloaded; may still need to consider RRT for UF  - avoid potential nephrotoxins

## 2019-11-03 LAB
ALBUMIN SERPL ELPH-MCNC: 2.9 G/DL — LOW (ref 3.3–5.2)
ALP SERPL-CCNC: 119 U/L — SIGNIFICANT CHANGE UP (ref 40–120)
ALT FLD-CCNC: 24 U/L — SIGNIFICANT CHANGE UP
ANION GAP SERPL CALC-SCNC: 12 MMOL/L — SIGNIFICANT CHANGE UP (ref 5–17)
AST SERPL-CCNC: 46 U/L — HIGH
BILIRUB SERPL-MCNC: 4.6 MG/DL — HIGH (ref 0.4–2)
BUN SERPL-MCNC: 33 MG/DL — HIGH (ref 8–20)
CALCIUM SERPL-MCNC: 9.5 MG/DL — SIGNIFICANT CHANGE UP (ref 8.6–10.2)
CHLORIDE SERPL-SCNC: 92 MMOL/L — LOW (ref 98–107)
CO2 SERPL-SCNC: 31 MMOL/L — HIGH (ref 22–29)
CREAT SERPL-MCNC: 1.06 MG/DL — SIGNIFICANT CHANGE UP (ref 0.5–1.3)
GLUCOSE BLDC GLUCOMTR-MCNC: 111 MG/DL — HIGH (ref 70–99)
GLUCOSE BLDC GLUCOMTR-MCNC: 155 MG/DL — HIGH (ref 70–99)
GLUCOSE BLDC GLUCOMTR-MCNC: 211 MG/DL — HIGH (ref 70–99)
GLUCOSE BLDC GLUCOMTR-MCNC: 243 MG/DL — HIGH (ref 70–99)
GLUCOSE SERPL-MCNC: 151 MG/DL — HIGH (ref 70–115)
HCT VFR BLD CALC: 27.8 % — LOW (ref 39–50)
HGB BLD-MCNC: 9.3 G/DL — LOW (ref 13–17)
MCHC RBC-ENTMCNC: 33.3 PG — SIGNIFICANT CHANGE UP (ref 27–34)
MCHC RBC-ENTMCNC: 33.5 GM/DL — SIGNIFICANT CHANGE UP (ref 32–36)
MCV RBC AUTO: 99.6 FL — SIGNIFICANT CHANGE UP (ref 80–100)
PLATELET # BLD AUTO: 60 K/UL — LOW (ref 150–400)
POTASSIUM SERPL-MCNC: 3.6 MMOL/L — SIGNIFICANT CHANGE UP (ref 3.5–5.3)
POTASSIUM SERPL-SCNC: 3.6 MMOL/L — SIGNIFICANT CHANGE UP (ref 3.5–5.3)
PROT SERPL-MCNC: 5.1 G/DL — LOW (ref 6.6–8.7)
RBC # BLD: 2.79 M/UL — LOW (ref 4.2–5.8)
RBC # FLD: 19.7 % — HIGH (ref 10.3–14.5)
SODIUM SERPL-SCNC: 135 MMOL/L — SIGNIFICANT CHANGE UP (ref 135–145)
WBC # BLD: 6.53 K/UL — SIGNIFICANT CHANGE UP (ref 3.8–10.5)
WBC # FLD AUTO: 6.53 K/UL — SIGNIFICANT CHANGE UP (ref 3.8–10.5)

## 2019-11-03 PROCEDURE — 99233 SBSQ HOSP IP/OBS HIGH 50: CPT

## 2019-11-03 RX ADMIN — Medication 40 MILLIGRAM(S): at 05:41

## 2019-11-03 RX ADMIN — URSODIOL 300 MILLIGRAM(S): 250 TABLET, FILM COATED ORAL at 14:07

## 2019-11-03 RX ADMIN — Medication 250 MILLIGRAM(S): at 17:27

## 2019-11-03 RX ADMIN — NYSTATIN CREAM 1 APPLICATION(S): 100000 CREAM TOPICAL at 05:41

## 2019-11-03 RX ADMIN — SPIRONOLACTONE 25 MILLIGRAM(S): 25 TABLET, FILM COATED ORAL at 05:41

## 2019-11-03 RX ADMIN — Medication 2: at 17:23

## 2019-11-03 RX ADMIN — Medication 250 MILLIGRAM(S): at 05:41

## 2019-11-03 RX ADMIN — Medication 40 MILLIEQUIVALENT(S): at 11:32

## 2019-11-03 RX ADMIN — Medication 1000 UNIT(S): at 11:31

## 2019-11-03 RX ADMIN — Medication 1 GRAM(S): at 05:41

## 2019-11-03 RX ADMIN — URSODIOL 300 MILLIGRAM(S): 250 TABLET, FILM COATED ORAL at 05:41

## 2019-11-03 RX ADMIN — TAMSULOSIN HYDROCHLORIDE 0.4 MILLIGRAM(S): 0.4 CAPSULE ORAL at 21:18

## 2019-11-03 RX ADMIN — ATORVASTATIN CALCIUM 20 MILLIGRAM(S): 80 TABLET, FILM COATED ORAL at 21:18

## 2019-11-03 RX ADMIN — Medication 40 MILLIGRAM(S): at 17:24

## 2019-11-03 RX ADMIN — Medication 1 GRAM(S): at 11:32

## 2019-11-03 RX ADMIN — PANTOPRAZOLE SODIUM 40 MILLIGRAM(S): 20 TABLET, DELAYED RELEASE ORAL at 17:26

## 2019-11-03 RX ADMIN — Medication 1 GRAM(S): at 23:23

## 2019-11-03 RX ADMIN — LIDOCAINE 2 PATCH: 4 CREAM TOPICAL at 01:03

## 2019-11-03 RX ADMIN — NYSTATIN CREAM 1 APPLICATION(S): 100000 CREAM TOPICAL at 17:24

## 2019-11-03 RX ADMIN — URSODIOL 300 MILLIGRAM(S): 250 TABLET, FILM COATED ORAL at 21:18

## 2019-11-03 RX ADMIN — LIDOCAINE 2 PATCH: 4 CREAM TOPICAL at 23:22

## 2019-11-03 RX ADMIN — Medication 1: at 11:31

## 2019-11-03 RX ADMIN — CEFTRIAXONE 100 MILLIGRAM(S): 500 INJECTION, POWDER, FOR SOLUTION INTRAMUSCULAR; INTRAVENOUS at 11:29

## 2019-11-03 RX ADMIN — LIDOCAINE 2 PATCH: 4 CREAM TOPICAL at 11:30

## 2019-11-03 RX ADMIN — Medication 1 GRAM(S): at 17:28

## 2019-11-03 RX ADMIN — LIDOCAINE 2 PATCH: 4 CREAM TOPICAL at 19:45

## 2019-11-03 RX ADMIN — PANTOPRAZOLE SODIUM 40 MILLIGRAM(S): 20 TABLET, DELAYED RELEASE ORAL at 05:41

## 2019-11-03 RX ADMIN — LACTULOSE 15 GRAM(S): 10 SOLUTION ORAL at 14:07

## 2019-11-03 NOTE — PROGRESS NOTE ADULT - ASSESSMENT
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 He was found to be hypoglycemic ~29 and had a TMax ~102.6. He was fluid resuscitated, given Abx Zosyn/ Vanco and also one amp D50%, repeat FS ~ 69 for which he received another amp D50. Lactate was 4.1 ICU consulted for sepsis with fever, persistent hypoglycemia and hypotension. Patient takes Lantus 40U and Tradjenta 5mg at home which he was compliant with; but he has not been eating well. In the ED, he had a diagnostic paracentesis ~ 20cc bloody fluid was drained. Fluid cultures were negative. Found to have gram positive bacteremia and sepsis.  Downgraded to the hospitalist service for further management. CT abdomen and pelvis consistent with hepatomegaly and cirrhosis. ID was consulted, started on IV rocehin for Group B bacteremia for a total of 2 weeks with a midline.  GI was consulted; Hepatitis panel negative. Initially patient was anemia, planned for EGD. However Hb/hct stablized and patient had respiratory distress with elevated INR therefore plans for EGD were cancelled.  Started on IV diuresis for volume overload.     1. Group B bacteremia: Source unclear  Repeat blood cultures on 10/23 were negative  IV rocephin until 11/6/19  LUE midline in place     2. Cirrhosis likely secondary to NAFLD  Continue lasix, aldactone and metolazone   Continue lactulose and rifaxamin  GI recommendations appreciated    3. MISA with volume overload  -renal function stable on diuretics  -monitor daily weights and if volume status does not improve; may require Ultrafiltration  -strict I/Os   -Renal ultrasound reviewed  -continue flomax  -nephrology recommendations appreciated    4. Anemia: suspected secondary to GI bleed  -Hb stable s/p PRBCs during hospitalization  -No further episodes of bleeding; EGD deferred per GI  -continue PPI and carafate    5. Acute urinary retention - resolved  -bob discontinued on 10/28  -Voiding well; Primafit in place   -continue flomax     6. Thrombocytopenia likely secondary to cirrhosis   -platelet counts remain stable    DVT ppx - SCDs    Dispo - Eventual discharge to [facility] on discharge when medically stable

## 2019-11-03 NOTE — PROGRESS NOTE ADULT - SUBJECTIVE AND OBJECTIVE BOX
NEPHROLOGY INTERVAL HPI/OVERNIGHT EVENTS:  pt remains comfortable  no cp, so, n/v/d    MEDICATIONS  (STANDING):  atorvastatin 20 milliGRAM(s) Oral at bedtime  cefTRIAXone   IVPB 2000 milliGRAM(s) IV Intermittent <User Schedule>  cholecalciferol 1000 Unit(s) Oral daily  dextrose 5%. 1000 milliLiter(s) (50 mL/Hr) IV Continuous <Continuous>  dextrose 50% Injectable 12.5 Gram(s) IV Push once  dextrose 50% Injectable 25 Gram(s) IV Push once  dextrose 50% Injectable 25 Gram(s) IV Push once  furosemide   Injectable 40 milliGRAM(s) IV Push every 12 hours  influenza   Vaccine 0.5 milliLiter(s) IntraMuscular once  insulin lispro (HumaLOG) corrective regimen sliding scale   SubCutaneous three times a day before meals  lactulose Syrup 15 Gram(s) Oral three times a day  lidocaine   Patch 2 Patch Transdermal daily  metolazone 2.5 milliGRAM(s) Oral daily  nystatin Ointment 1 Application(s) Topical two times a day  pantoprazole  Injectable 40 milliGRAM(s) IV Push two times a day  potassium chloride    Tablet ER 40 milliEquivalent(s) Oral daily  rifAXIMin 550 milliGRAM(s) Oral two times a day  saccharomyces boulardii 250 milliGRAM(s) Oral two times a day  senna 2 Tablet(s) Oral at bedtime  spironolactone 25 milliGRAM(s) Oral daily  sucralfate 1 Gram(s) Oral every 6 hours  tamsulosin 0.4 milliGRAM(s) Oral at bedtime  ursodiol Capsule 300 milliGRAM(s) Oral three times a day    MEDICATIONS  (PRN):  dextrose 40% Gel 15 Gram(s) Oral once PRN Blood Glucose LESS THAN 70 milliGRAM(s)/deciliter  glucagon  Injectable 1 milliGRAM(s) IntraMuscular once PRN Glucose LESS THAN 70 milligrams/deciliter  ondansetron Injectable 4 milliGRAM(s) IV Push every 6 hours PRN Nausea and/or Vomiting      Allergies    codeine (Anaphylaxis)          Vital Signs Last 24 Hrs  T(C): 36.4 (03 Nov 2019 08:00), Max: 36.7 (02 Nov 2019 16:17)  T(F): 97.6 (03 Nov 2019 08:00), Max: 98.1 (02 Nov 2019 21:45)  HR: 82 (03 Nov 2019 08:00) (81 - 84)  BP: 107/58 (03 Nov 2019 08:00) (107/58 - 119/61)  BP(mean): --  RR: 18 (03 Nov 2019 08:00) (18 - 18)  SpO2: 94% (03 Nov 2019 08:00) (90% - 94%)    PHYSICAL EXAM:  GENERAL: Obese; weak  EYES:  conjunctiva and sclera clear  NECK: Supple, No JVD/Carotid Bruit  NERVOUS SYSTEM:  A/O x3, no asterixis  Lungs: Diminished BS at bases   HEART:  No murmur,  No rub  ABDOMEN: Soft, NT/ND BS+; Ascites +  EXTREMITIES: + anasarca improved  : scrotal edema improved    LABS:                        9.3    6.53  )-----------( 60       ( 03 Nov 2019 06:14 )             27.8     11-03    135  |  92<L>  |  33.0<H>  ----------------------------<  151<H>  3.6   |  31.0<H>  |  1.06    Ca    9.5      03 Nov 2019 06:14      TPro  5.1<L>  /  Alb  2.9<L>  /  TBili  4.6<H>  /  DBili  x   /  AST  46<H>  /  ALT  24  /  AlkPhos  119  11-03            RADIOLOGY & ADDITIONAL TESTS:

## 2019-11-03 NOTE — PROGRESS NOTE ADULT - ASSESSMENT
63y man with cirrhosis presented with JEAN cirrhosis, ascites and LE edema. No active gi bleeding. Hgb stable.   - cont lactulose and rifaximin  - PPI PO BID   - cont diuretics as per renal, monitor CR  - monitor I's and O's  - Hepatology transplant referral as an out patient (please arrange prior to discharge)    Thank you for the consult.  GI will sign off.  Please reconsult as needed and call with any questions or concerns.     PABLITO Schmitz MD  [facility] Physician [facility]  Division of Gastroenterology  Tel [phone]  Fax [phone]

## 2019-11-03 NOTE — PROGRESS NOTE ADULT - SUBJECTIVE AND OBJECTIVE BOX
CHIEF COMPLAINT/INTERVAL HISTORY:    Patient is a 63y old  Male who presents with a chief complaint of Persistent hypoglycemia (03 Nov 2019 10:05)    SUBJECTIVE & OBJECTIVE: Pt seen and examined at bedside. No overnight events. Patient reports feeling better. On diuretics; if edema does not improve will need RRT for UF. Eventual discharge to [facility].    ROS: No chest pain, palpitations, SOB, light headedness, dizziness, headache, nausea/vomiting, fevers/chills, abdominal pain, dysuria or increased urinary frequency.    ICU Vital Signs Last 24 Hrs  T(C): 36.4 (03 Nov 2019 08:00), Max: 36.7 (02 Nov 2019 16:17)  T(F): 97.6 (03 Nov 2019 08:00), Max: 98.1 (02 Nov 2019 21:45)  HR: 82 (03 Nov 2019 08:00) (81 - 84)  BP: 107/58 (03 Nov 2019 08:00) (107/58 - 119/61)  RR: 18 (03 Nov 2019 08:00) (18 - 18)  SpO2: 94% (03 Nov 2019 08:00) (90% - 94%)      MEDICATIONS  (STANDING):  atorvastatin 20 milliGRAM(s) Oral at bedtime  cefTRIAXone   IVPB 2000 milliGRAM(s) IV Intermittent <User Schedule>  cholecalciferol 1000 Unit(s) Oral daily  dextrose 5%. 1000 milliLiter(s) (50 mL/Hr) IV Continuous <Continuous>  dextrose 50% Injectable 12.5 Gram(s) IV Push once  dextrose 50% Injectable 25 Gram(s) IV Push once  dextrose 50% Injectable 25 Gram(s) IV Push once  furosemide   Injectable 40 milliGRAM(s) IV Push every 12 hours  influenza   Vaccine 0.5 milliLiter(s) IntraMuscular once  insulin lispro (HumaLOG) corrective regimen sliding scale   SubCutaneous three times a day before meals  lactulose Syrup 15 Gram(s) Oral three times a day  lidocaine   Patch 2 Patch Transdermal daily  metolazone 2.5 milliGRAM(s) Oral daily  nystatin Ointment 1 Application(s) Topical two times a day  pantoprazole  Injectable 40 milliGRAM(s) IV Push two times a day  potassium chloride    Tablet ER 40 milliEquivalent(s) Oral daily  rifAXIMin 550 milliGRAM(s) Oral two times a day  saccharomyces boulardii 250 milliGRAM(s) Oral two times a day  senna 2 Tablet(s) Oral at bedtime  spironolactone 25 milliGRAM(s) Oral daily  sucralfate 1 Gram(s) Oral every 6 hours  tamsulosin 0.4 milliGRAM(s) Oral at bedtime  ursodiol Capsule 300 milliGRAM(s) Oral three times a day    MEDICATIONS  (PRN):  dextrose 40% Gel 15 Gram(s) Oral once PRN Blood Glucose LESS THAN 70 milliGRAM(s)/deciliter  glucagon  Injectable 1 milliGRAM(s) IntraMuscular once PRN Glucose LESS THAN 70 milligrams/deciliter  ondansetron Injectable 4 milliGRAM(s) IV Push every 6 hours PRN Nausea and/or Vomiting      LABS:                        9.3    6.53  )-----------( 60       ( 03 Nov 2019 06:14 )             27.8     11-03    135  |  92<L>  |  33.0<H>  ----------------------------<  151<H>  3.6   |  31.0<H>  |  1.06    Ca    9.5      03 Nov 2019 06:14    TPro  5.1<L>  /  Alb  2.9<L>  /  TBili  4.6<H>  /  DBili  x   /  AST  46<H>  /  ALT  24  /  AlkPhos  119  11-03      CAPILLARY BLOOD GLUCOSE      POCT Blood Glucose.: 155 mg/dL (03 Nov 2019 11:28)  POCT Blood Glucose.: 111 mg/dL (03 Nov 2019 08:02)  POCT Blood Glucose.: 162 mg/dL (02 Nov 2019 21:51)  POCT Blood Glucose.: 187 mg/dL (02 Nov 2019 16:51)      PHYSICAL EXAM:    GENERAL: elderly male, chronically ill appearing, not in acute distress  HEAD:  Atraumatic, Normocephalic  EYES: EOMI, PERRLA, conjunctiva and sclera clear  ENMT: Moist mucous membranes  NECK: Supple   NERVOUS SYSTEM:  Alert & Oriented X3   CHEST/LUNG: bilateral air entry, coarse  HEART: Regular rate and rhythm; + S1/S2, +murmur  ABDOMEN: Soft, Nontender, distended; Bowel sounds present  EXTREMITIES:  ++ LE edema

## 2019-11-03 NOTE — PROGRESS NOTE ADULT - SUBJECTIVE AND OBJECTIVE BOX
Chief Complaint: This is a 63y old Male patient being seen for follow-up consultation for cirrhosis.    HPI / 24H events:  Patient reports feeling fine.  Has no complaints.      ROS: A 14-point review of systems was reviewed and was otherwise negative save what was reported in the HPI.    PAST MEDICAL/SURGICAL HISTORY:  GIB (gastrointestinal bleeding)  GERD with esophagitis: Gastritis &amp; Non Bleeding Ulcers  Hepatic encephalopathy  Obesity  Fatty liver disease, nonalcoholic  Renal stones: 25 years ago  Hypertension  Neuropathy  Hypercholesteremia  Diabetes  S/P cholecystectomy    MEDICATIONS  (STANDING):  atorvastatin 20 milliGRAM(s) Oral at bedtime  cefTRIAXone   IVPB 2000 milliGRAM(s) IV Intermittent <User Schedule>  cholecalciferol 1000 Unit(s) Oral daily  dextrose 5%. 1000 milliLiter(s) (50 mL/Hr) IV Continuous <Continuous>  dextrose 50% Injectable 12.5 Gram(s) IV Push once  dextrose 50% Injectable 25 Gram(s) IV Push once  dextrose 50% Injectable 25 Gram(s) IV Push once  influenza   Vaccine 0.5 milliLiter(s) IntraMuscular once  insulin lispro (HumaLOG) corrective regimen sliding scale   SubCutaneous three times a day before meals  lactulose Syrup 15 Gram(s) Oral three times a day  lidocaine   Patch 2 Patch Transdermal daily  metolazone 2.5 milliGRAM(s) Oral daily  nystatin Ointment 1 Application(s) Topical two times a day  pantoprazole  Injectable 40 milliGRAM(s) IV Push two times a day  potassium chloride    Tablet ER 40 milliEquivalent(s) Oral daily  rifAXIMin 550 milliGRAM(s) Oral two times a day  saccharomyces boulardii 250 milliGRAM(s) Oral two times a day  senna 2 Tablet(s) Oral at bedtime  spironolactone 25 milliGRAM(s) Oral daily  sucralfate 1 Gram(s) Oral every 6 hours  tamsulosin 0.4 milliGRAM(s) Oral at bedtime  ursodiol Capsule 300 milliGRAM(s) Oral three times a day    MEDICATIONS  (PRN):  dextrose 40% Gel 15 Gram(s) Oral once PRN Blood Glucose LESS THAN 70 milliGRAM(s)/deciliter  glucagon  Injectable 1 milliGRAM(s) IntraMuscular once PRN Glucose LESS THAN 70 milligrams/deciliter  ondansetron Injectable 4 milliGRAM(s) IV Push every 6 hours PRN Nausea and/or Vomiting    T(C): 36.6 (11-03-19 @ 16:53), Max: 36.7 (11-02-19 @ 21:45)  HR: 82 (11-03-19 @ 16:53) (81 - 82)  BP: 106/62 (11-03-19 @ 16:53) (106/62 - 119/61)  RR: 18 (11-03-19 @ 16:53) (18 - 18)  SpO2: 90% (11-03-19 @ 16:53) (90% - 94%)    I&O's Summary    02 Nov 2019 08:01  -  03 Nov 2019 07:00  --------------------------------------------------------  IN: 0 mL / OUT: 1600 mL / NET: -1600 mL    03 Nov 2019 07:01  -  03 Nov 2019 18:31  --------------------------------------------------------  IN: 50 mL / OUT: 1500 mL / NET: -1450 mL                              9.3    6.53  )-----------( 60       ( 03 Nov 2019 06:14 )             27.8     11-03    135  |  92<L>  |  33.0<H>  ----------------------------<  151<H>  3.6   |  31.0<H>  |  1.06    Ca    9.5      03 Nov 2019 06:14    TPro  5.1<L>  /  Alb  2.9<L>  /  TBili  4.6<H>  /  DBili  x   /  AST  46<H>  /  ALT  24  /  AlkPhos  119  11-03    LIVER FUNCTIONS - ( 03 Nov 2019 06:14 )  Alb: 2.9 g/dL / Pro: 5.1 g/dL / ALK PHOS: 119 U/L / ALT: 24 U/L / AST: 46 U/L / GGT: x

## 2019-11-04 LAB
ANION GAP SERPL CALC-SCNC: 11 MMOL/L — SIGNIFICANT CHANGE UP (ref 5–17)
BASOPHILS # BLD AUTO: 0.06 K/UL — SIGNIFICANT CHANGE UP (ref 0–0.2)
BASOPHILS NFR BLD AUTO: 0.9 % — SIGNIFICANT CHANGE UP (ref 0–2)
BUN SERPL-MCNC: 38 MG/DL — HIGH (ref 8–20)
CALCIUM SERPL-MCNC: 9 MG/DL — SIGNIFICANT CHANGE UP (ref 8.6–10.2)
CHLORIDE SERPL-SCNC: 91 MMOL/L — LOW (ref 98–107)
CO2 SERPL-SCNC: 32 MMOL/L — HIGH (ref 22–29)
CREAT SERPL-MCNC: 1.2 MG/DL — SIGNIFICANT CHANGE UP (ref 0.5–1.3)
EOSINOPHIL # BLD AUTO: 0.17 K/UL — SIGNIFICANT CHANGE UP (ref 0–0.5)
EOSINOPHIL NFR BLD AUTO: 2.6 % — SIGNIFICANT CHANGE UP (ref 0–6)
GLUCOSE BLDC GLUCOMTR-MCNC: 184 MG/DL — HIGH (ref 70–99)
GLUCOSE BLDC GLUCOMTR-MCNC: 207 MG/DL — HIGH (ref 70–99)
GLUCOSE BLDC GLUCOMTR-MCNC: 273 MG/DL — HIGH (ref 70–99)
GLUCOSE SERPL-MCNC: 207 MG/DL — HIGH (ref 70–115)
HCT VFR BLD CALC: 26.5 % — LOW (ref 39–50)
HGB BLD-MCNC: 8.9 G/DL — LOW (ref 13–17)
IMM GRANULOCYTES NFR BLD AUTO: 0.5 % — SIGNIFICANT CHANGE UP (ref 0–1.5)
LYMPHOCYTES # BLD AUTO: 1.2 K/UL — SIGNIFICANT CHANGE UP (ref 1–3.3)
LYMPHOCYTES # BLD AUTO: 18.5 % — SIGNIFICANT CHANGE UP (ref 13–44)
MAGNESIUM SERPL-MCNC: 1.5 MG/DL — LOW (ref 1.6–2.6)
MCHC RBC-ENTMCNC: 33.1 PG — SIGNIFICANT CHANGE UP (ref 27–34)
MCHC RBC-ENTMCNC: 33.6 GM/DL — SIGNIFICANT CHANGE UP (ref 32–36)
MCV RBC AUTO: 98.5 FL — SIGNIFICANT CHANGE UP (ref 80–100)
MONOCYTES # BLD AUTO: 0.86 K/UL — SIGNIFICANT CHANGE UP (ref 0–0.9)
MONOCYTES NFR BLD AUTO: 13.3 % — SIGNIFICANT CHANGE UP (ref 2–14)
NEUTROPHILS # BLD AUTO: 4.17 K/UL — SIGNIFICANT CHANGE UP (ref 1.8–7.4)
NEUTROPHILS NFR BLD AUTO: 64.2 % — SIGNIFICANT CHANGE UP (ref 43–77)
PHOSPHATE SERPL-MCNC: 2.6 MG/DL — SIGNIFICANT CHANGE UP (ref 2.4–4.7)
PLATELET # BLD AUTO: 62 K/UL — LOW (ref 150–400)
POTASSIUM SERPL-MCNC: 3.9 MMOL/L — SIGNIFICANT CHANGE UP (ref 3.5–5.3)
POTASSIUM SERPL-SCNC: 3.9 MMOL/L — SIGNIFICANT CHANGE UP (ref 3.5–5.3)
RBC # BLD: 2.69 M/UL — LOW (ref 4.2–5.8)
RBC # FLD: 20 % — HIGH (ref 10.3–14.5)
SODIUM SERPL-SCNC: 134 MMOL/L — LOW (ref 135–145)
WBC # BLD: 6.49 K/UL — SIGNIFICANT CHANGE UP (ref 3.8–10.5)
WBC # FLD AUTO: 6.49 K/UL — SIGNIFICANT CHANGE UP (ref 3.8–10.5)

## 2019-11-04 PROCEDURE — 99233 SBSQ HOSP IP/OBS HIGH 50: CPT

## 2019-11-04 RX ORDER — FUROSEMIDE 40 MG
60 TABLET ORAL EVERY 8 HOURS
Refills: 0 | Status: DISCONTINUED | OUTPATIENT
Start: 2019-11-04 | End: 2019-11-06

## 2019-11-04 RX ORDER — FUROSEMIDE 40 MG
40 TABLET ORAL EVERY 12 HOURS
Refills: 0 | Status: DISCONTINUED | OUTPATIENT
Start: 2019-11-04 | End: 2019-11-04

## 2019-11-04 RX ADMIN — Medication 40 MILLIEQUIVALENT(S): at 12:39

## 2019-11-04 RX ADMIN — Medication 1 GRAM(S): at 12:40

## 2019-11-04 RX ADMIN — URSODIOL 300 MILLIGRAM(S): 250 TABLET, FILM COATED ORAL at 22:58

## 2019-11-04 RX ADMIN — LACTULOSE 15 GRAM(S): 10 SOLUTION ORAL at 05:14

## 2019-11-04 RX ADMIN — SPIRONOLACTONE 25 MILLIGRAM(S): 25 TABLET, FILM COATED ORAL at 05:13

## 2019-11-04 RX ADMIN — Medication 2: at 16:51

## 2019-11-04 RX ADMIN — NYSTATIN CREAM 1 APPLICATION(S): 100000 CREAM TOPICAL at 05:12

## 2019-11-04 RX ADMIN — Medication 1: at 07:55

## 2019-11-04 RX ADMIN — Medication 1 GRAM(S): at 16:50

## 2019-11-04 RX ADMIN — LIDOCAINE 2 PATCH: 4 CREAM TOPICAL at 12:36

## 2019-11-04 RX ADMIN — Medication 250 MILLIGRAM(S): at 05:13

## 2019-11-04 RX ADMIN — ATORVASTATIN CALCIUM 20 MILLIGRAM(S): 80 TABLET, FILM COATED ORAL at 22:58

## 2019-11-04 RX ADMIN — URSODIOL 300 MILLIGRAM(S): 250 TABLET, FILM COATED ORAL at 05:12

## 2019-11-04 RX ADMIN — Medication 60 MILLIGRAM(S): at 22:55

## 2019-11-04 RX ADMIN — LIDOCAINE 2 PATCH: 4 CREAM TOPICAL at 22:53

## 2019-11-04 RX ADMIN — TAMSULOSIN HYDROCHLORIDE 0.4 MILLIGRAM(S): 0.4 CAPSULE ORAL at 22:57

## 2019-11-04 RX ADMIN — URSODIOL 300 MILLIGRAM(S): 250 TABLET, FILM COATED ORAL at 12:40

## 2019-11-04 RX ADMIN — Medication 2: at 12:37

## 2019-11-04 RX ADMIN — Medication 1 GRAM(S): at 22:59

## 2019-11-04 RX ADMIN — PANTOPRAZOLE SODIUM 40 MILLIGRAM(S): 20 TABLET, DELAYED RELEASE ORAL at 05:12

## 2019-11-04 RX ADMIN — LACTULOSE 15 GRAM(S): 10 SOLUTION ORAL at 22:55

## 2019-11-04 RX ADMIN — Medication 1 GRAM(S): at 05:12

## 2019-11-04 RX ADMIN — Medication 250 MILLIGRAM(S): at 16:50

## 2019-11-04 RX ADMIN — SENNA PLUS 2 TABLET(S): 8.6 TABLET ORAL at 22:58

## 2019-11-04 RX ADMIN — NYSTATIN CREAM 1 APPLICATION(S): 100000 CREAM TOPICAL at 16:51

## 2019-11-04 RX ADMIN — PANTOPRAZOLE SODIUM 40 MILLIGRAM(S): 20 TABLET, DELAYED RELEASE ORAL at 16:50

## 2019-11-04 RX ADMIN — Medication 1000 UNIT(S): at 12:39

## 2019-11-04 RX ADMIN — LACTULOSE 15 GRAM(S): 10 SOLUTION ORAL at 12:40

## 2019-11-04 RX ADMIN — CEFTRIAXONE 100 MILLIGRAM(S): 500 INJECTION, POWDER, FOR SOLUTION INTRAMUSCULAR; INTRAVENOUS at 12:36

## 2019-11-04 NOTE — PROGRESS NOTE ADULT - ASSESSMENT
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 He was found to be hypoglycemic ~29 and had a TMax ~102.6. He was fluid resuscitated, given Abx Zosyn/ Vanco and also one amp D50%, repeat FS ~ 69 for which he received another amp D50. Lactate was 4.1 ICU consulted for sepsis with fever, persistent hypoglycemia and hypotension. Patient takes Lantus 40U and Tradjenta 5mg at home which he was compliant with; but he has not been eating well. In the ED, he had a diagnostic paracentesis ~ 20cc bloody fluid was drained. Fluid cultures were negative. Found to have gram positive bacteremia and sepsis.  Downgraded to the hospitalist service for further management. CT abdomen and pelvis consistent with hepatomegaly and cirrhosis. ID was consulted, started on IV rocehin for Group B bacteremia for a total of 2 weeks with a midline.  GI was consulted; Hepatitis panel negative. Initially patient was anemic, planned for EGD. However Hb/hct stablized and patient had respiratory distress with elevated INR therefore plans for EGD were cancelled.  Started on IV diuresis for volume overload.    1. Group B bacteremia: Source unclear  - Repeat blood cultures on 10/23 were negative  - IV rocephin until 11/6/19  - LUE midline in place     2. Cirrhosis likely secondary to NAFLD  - Continue lasix, aldactone and metolazone   - Continue lactulose and rifaxamin  - PPI PO BID  - Hepatology transplant referral as an out patient (please arrange prior to discharge), as per GI note    3. MISA with volume overload  - Renal function stable on diuretics, monitor Cr  - Monitor daily weights and if volume status does not improve; may require Ultrafiltration  - Strict I/Os   - Renal ultrasound reviewed  - Continue flomax  - Nephrology following and recommendations appreciated    4. Anemia: suspected secondary to GI bleed  - Hb stable s/p PRBCs during hospitalization  - No further episodes of bleeding; EGD deferred per GI  - Continue PPI and carafate    5. Acute urinary retention - resolved  - Beasley discontinued on 10/28  - Voiding well; Primafit in place   - Continue flomax     6. Thrombocytopenia likely secondary to cirrhosis   - Platelet counts remain stable    Dispo - Eventual discharge to [facility] on discharge when medically stable The patient is a 63 year old male with a past medical history of HLD, non-alcoholic cirrhosis w/ hepatic encephalopathy (on lactulose), ascites/fluid overload, GIB, chronic thrombocytopenia presented to the ED complaining of chills. He was found to be hypoglycemic ~29 and had a TMax ~102.6. He was fluid resuscitated, given Abx Zosyn/ Vanco and also one amp D50%, repeat FS ~ 69 for which he received another amp D50. Lactate was 4.1 ICU consulted for sepsis with fever, persistent hypoglycemia and hypotension. Patient takes Lantus 40U and Tradjenta 5mg at home which he was compliant with; but he has not been eating well. In the ED, he had a diagnostic paracentesis ~ 20cc bloody fluid was drained. Fluid cultures were negative. Found to have gram positive bacteremia and sepsis.  Downgraded to the hospitalist service for further management. CT abdomen and pelvis consistent with hepatomegaly and cirrhosis. ID was consulted, started on IV rocehin for Group B bacteremia for a total of 2 weeks with a midline.  GI was consulted; Hepatitis panel negative. Initially patient was anemic, planned for EGD. However Hb/hct stablized and patient had respiratory distress with elevated INR therefore plans for EGD were cancelled.  Started on IV diuresis for volume overload.    1. Group B bacteremia: Source unclear  - Repeat blood cultures on 10/23 were negative  - IV rocephin until 11/6/19  - LUE midline in place     2. Cirrhosis likely secondary to NAFLD  - Continue lasix, aldactone and metolazone   - Continue lactulose and rifaxamin  - PPI PO BID  - Hepatology transplant referral as an out patient (please arrange prior to discharge), as per GI note    3. MISA with volume overload  - Discussed with Dr. Yanez, ?possible increase in lasix and will likely not require UF  - Renal function stable on diuretics, monitor Cr  - Monitor daily weights and if volume status does not improve; may require Ultrafiltration  - Strict I/Os   - Renal ultrasound reviewed  - Continue flomax  - Nephrology following and will f/u with recommendations    4. Anemia: suspected secondary to GI bleed  - Hb stable s/p PRBCs during hospitalization  - No further episodes of bleeding; EGD deferred per GI  - Continue PPI and carafate    5. Acute urinary retention - resolved  - Beasley discontinued on 10/28  - Voiding well; Primafit in place   - Continue flomax     6. Thrombocytopenia likely secondary to cirrhosis   - Platelet counts remain stable    Dispo - Eventual discharge (24-48hrs) to [facility] on discharge when medically stable.

## 2019-11-04 NOTE — PROGRESS NOTE ADULT - SUBJECTIVE AND OBJECTIVE BOX
NEPHROLOGY INTERVAL HPI/OVERNIGHT EVENTS:    Examined  Remains edmatous but improved  Feeling better    MEDICATIONS  (STANDING):  atorvastatin 20 milliGRAM(s) Oral at bedtime  cefTRIAXone   IVPB 2000 milliGRAM(s) IV Intermittent <User Schedule>  cholecalciferol 1000 Unit(s) Oral daily  dextrose 5%. 1000 milliLiter(s) (50 mL/Hr) IV Continuous <Continuous>  dextrose 50% Injectable 12.5 Gram(s) IV Push once  dextrose 50% Injectable 25 Gram(s) IV Push once  dextrose 50% Injectable 25 Gram(s) IV Push once  furosemide   Injectable 40 milliGRAM(s) IV Push every 12 hours  influenza   Vaccine 0.5 milliLiter(s) IntraMuscular once  insulin lispro (HumaLOG) corrective regimen sliding scale   SubCutaneous three times a day before meals  lactulose Syrup 15 Gram(s) Oral three times a day  lidocaine   Patch 2 Patch Transdermal daily  metolazone 2.5 milliGRAM(s) Oral daily  nystatin Ointment 1 Application(s) Topical two times a day  pantoprazole  Injectable 40 milliGRAM(s) IV Push two times a day  potassium chloride    Tablet ER 40 milliEquivalent(s) Oral daily  rifAXIMin 550 milliGRAM(s) Oral two times a day  saccharomyces boulardii 250 milliGRAM(s) Oral two times a day  senna 2 Tablet(s) Oral at bedtime  spironolactone 25 milliGRAM(s) Oral daily  sucralfate 1 Gram(s) Oral every 6 hours  tamsulosin 0.4 milliGRAM(s) Oral at bedtime  ursodiol Capsule 300 milliGRAM(s) Oral three times a day    MEDICATIONS  (PRN):  dextrose 40% Gel 15 Gram(s) Oral once PRN Blood Glucose LESS THAN 70 milliGRAM(s)/deciliter  glucagon  Injectable 1 milliGRAM(s) IntraMuscular once PRN Glucose LESS THAN 70 milligrams/deciliter  ondansetron Injectable 4 milliGRAM(s) IV Push every 6 hours PRN Nausea and/or Vomiting      Allergies    codeine (Anaphylaxis)    Intolerances    NO  RED MEAT (Unknown)      Vital Signs Last 24 Hrs  T(C): 37 (2019 08:42), Max: 37 (2019 08:42)  T(F): 98.6 (2019 08:42), Max: 98.6 (2019 08:42)  HR: 84 (2019 08:42) (79 - 84)  BP: 106/63 (2019 08:42) (106/62 - 114/64)  BP(mean): --  RR: 18 (2019 08:42) (18 - 18)  SpO2: 94% (2019 08:42) (90% - 94%)  Daily     Daily Weight in k.6 (2019 05:11)    PHYSICAL EXAM:  GENERAL: Obese; chronically debilitated  EYES:  conjunctiva and sclera clear  NECK: Supple, No JVD/Carotid Bruit  NERVOUS SYSTEM:  A/O x3, no asterixis  Lungs: Diminished BS at bases   HEART:  No murmur,  No rub  ABDOMEN: Soft, NT/ND BS+; Ascites +  EXTREMITIES: + anasarca, scrotal edema  : scrotal edema    LABS:                        8.9    6.49  )-----------( 62       ( 2019 08:17 )             26.5     11    134<L>  |  91<L>  |  38.0<H>  ----------------------------<  207<H>  3.9   |  32.0<H>  |  1.20    Ca    9.0      2019 08:17  Phos  2.6       Mg     1.5         TPro  5.1<L>  /  Alb  2.9<L>  /  TBili  4.6<H>  /  DBili  x   /  AST  46<H>  /  ALT  24  /  AlkPhos  119          Magnesium, Serum: 1.5 mg/dL ( @ 08:17)  Phosphorus Level, Serum: 2.6 mg/dL ( @ 08:17)          RADIOLOGY & ADDITIONAL TESTS:

## 2019-11-04 NOTE — PROGRESS NOTE ADULT - ASSESSMENT
MISA: resolved cr 1.2  Concepcion+<30 c/w pre renal etiology (Hepatic cirrhosis)  (+)ATN from hypotension/sepsis==> resolved    Anasarca and pleural effusions  - cont Lasix will escalate   - cont metolazone   - awaiting 24 hr urine protein- ordered today  -Will consider RRT for UF if medical management cannot sufficiently impact fluid overload- first need to escalate diuretics and watch    Will follow

## 2019-11-04 NOTE — PROGRESS NOTE ADULT - SUBJECTIVE AND OBJECTIVE BOX
CC: Persistent hypoglycemia  INTERVAL HPI/OVERNIGHT EVENTS: Pt seen and examined at bedside. No overnight events. Patient reports feeling less swollen. Patient states he is feeling down today, and states "it is just one of those days". Patient is hopeful about getting better. He denies SI/HI and does not wish to speak to Psych at this time. Patient denies CP, SOB, HA, dizziness, N/V, fevers/chills, abdominal pain or dysuria.     PHYSICAL EXAM:    Vital Signs Last 24 Hrs  T(C): 37 (04 Nov 2019 08:42), Max: 37 (04 Nov 2019 08:42)  T(F): 98.6 (04 Nov 2019 08:42), Max: 98.6 (04 Nov 2019 08:42)  HR: 84 (04 Nov 2019 08:42) (79 - 84)  BP: 106/63 (04 Nov 2019 08:42) (106/62 - 114/64)  BP(mean): --  RR: 18 (04 Nov 2019 08:42) (18 - 18)  SpO2: 94% (04 Nov 2019 08:42) (90% - 94%)    GENERAL: Pt lying comfortably, NAD.  ENMT: PERRL, +EOMI.  NECK: soft, Supple, No JVD,   CHEST/LUNG: Clear to auscultation bilaterally; No wheezing.  HEART: S1S2+, Regular rate and rhythm; No murmurs.  ABDOMEN: Soft, Nontender, Nondistended; Bowel sounds present.  MUSCULOSKELETAL: B/L LE 2+ pitting edema. Peripheral pulses intact. Normal range of motion.  SKIN: Warm and dry.  NEURO: AAOX3, no focal deficits, no motor or sensory loss.  PSYCH: Calm and pleasant.                        8.9    6.49  )-----------( 62       ( 04 Nov 2019 08:17 )             26.5     11-04    134<L>  |  91<L>  |  38.0<H>  ----------------------------<  207<H>  3.9   |  32.0<H>  |  1.20    Ca    9.0      04 Nov 2019 08:17  Phos  2.6     11-04  Mg     1.5     11-04    TPro  5.1<L>  /  Alb  2.9<L>  /  TBili  4.6<H>  /  DBili  x   /  AST  46<H>  /  ALT  24  /  AlkPhos  119  11-03        CAPILLARY BLOOD GLUCOSE    POCT Blood Glucose.: 184 mg/dL (04 Nov 2019 07:46)  POCT Blood Glucose.: 243 mg/dL (03 Nov 2019 21:02)  POCT Blood Glucose.: 211 mg/dL (03 Nov 2019 17:05)

## 2019-11-05 LAB
AMMONIA BLD-MCNC: 76 UMOL/L — HIGH (ref 11–55)
ANION GAP SERPL CALC-SCNC: 10 MMOL/L — SIGNIFICANT CHANGE UP (ref 5–17)
ANISOCYTOSIS BLD QL: SIGNIFICANT CHANGE UP
BASOPHILS # BLD AUTO: 0.24 K/UL — HIGH (ref 0–0.2)
BASOPHILS NFR BLD AUTO: 4.3 % — HIGH (ref 0–2)
BUN SERPL-MCNC: 39 MG/DL — HIGH (ref 8–20)
CALCIUM SERPL-MCNC: 9.1 MG/DL — SIGNIFICANT CHANGE UP (ref 8.6–10.2)
CHLORIDE SERPL-SCNC: 91 MMOL/L — LOW (ref 98–107)
CO2 SERPL-SCNC: 31 MMOL/L — HIGH (ref 22–29)
CREAT SERPL-MCNC: 1.34 MG/DL — HIGH (ref 0.5–1.3)
EOSINOPHIL # BLD AUTO: 0.14 K/UL — SIGNIFICANT CHANGE UP (ref 0–0.5)
EOSINOPHIL NFR BLD AUTO: 2.6 % — SIGNIFICANT CHANGE UP (ref 0–6)
GIANT PLATELETS BLD QL SMEAR: PRESENT — SIGNIFICANT CHANGE UP
GLUCOSE BLDC GLUCOMTR-MCNC: 221 MG/DL — HIGH (ref 70–99)
GLUCOSE BLDC GLUCOMTR-MCNC: 233 MG/DL — HIGH (ref 70–99)
GLUCOSE BLDC GLUCOMTR-MCNC: 235 MG/DL — HIGH (ref 70–99)
GLUCOSE BLDC GLUCOMTR-MCNC: 243 MG/DL — HIGH (ref 70–99)
GLUCOSE SERPL-MCNC: 242 MG/DL — HIGH (ref 70–115)
HCT VFR BLD CALC: 25.7 % — LOW (ref 39–50)
HGB BLD-MCNC: 8.8 G/DL — LOW (ref 13–17)
HYPOCHROMIA BLD QL: SIGNIFICANT CHANGE UP
LYMPHOCYTES # BLD AUTO: 0.94 K/UL — LOW (ref 1–3.3)
LYMPHOCYTES # BLD AUTO: 17.2 % — SIGNIFICANT CHANGE UP (ref 13–44)
MACROCYTES BLD QL: SIGNIFICANT CHANGE UP
MAGNESIUM SERPL-MCNC: 1.5 MG/DL — LOW (ref 1.8–2.6)
MANUAL SMEAR VERIFICATION: SIGNIFICANT CHANGE UP
MCHC RBC-ENTMCNC: 33.8 PG — SIGNIFICANT CHANGE UP (ref 27–34)
MCHC RBC-ENTMCNC: 34.2 GM/DL — SIGNIFICANT CHANGE UP (ref 32–36)
MCV RBC AUTO: 98.8 FL — SIGNIFICANT CHANGE UP (ref 80–100)
METAMYELOCYTES # FLD: 0.9 % — HIGH (ref 0–0)
MONOCYTES # BLD AUTO: 0.38 K/UL — SIGNIFICANT CHANGE UP (ref 0–0.9)
MONOCYTES NFR BLD AUTO: 6.9 % — SIGNIFICANT CHANGE UP (ref 2–14)
NEUTROPHILS # BLD AUTO: 3.73 K/UL — SIGNIFICANT CHANGE UP (ref 1.8–7.4)
NEUTROPHILS NFR BLD AUTO: 68.1 % — SIGNIFICANT CHANGE UP (ref 43–77)
OVALOCYTES BLD QL SMEAR: SLIGHT — SIGNIFICANT CHANGE UP
PHOSPHATE SERPL-MCNC: 2.6 MG/DL — SIGNIFICANT CHANGE UP (ref 2.4–4.7)
PLAT MORPH BLD: NORMAL — SIGNIFICANT CHANGE UP
PLATELET # BLD AUTO: 65 K/UL — LOW (ref 150–400)
POTASSIUM SERPL-MCNC: 3.9 MMOL/L — SIGNIFICANT CHANGE UP (ref 3.5–5.3)
POTASSIUM SERPL-SCNC: 3.9 MMOL/L — SIGNIFICANT CHANGE UP (ref 3.5–5.3)
RBC # BLD: 2.6 M/UL — LOW (ref 4.2–5.8)
RBC # FLD: 20.2 % — HIGH (ref 10.3–14.5)
RBC BLD AUTO: ABNORMAL
SODIUM SERPL-SCNC: 132 MMOL/L — LOW (ref 135–145)
WBC # BLD: 5.47 K/UL — SIGNIFICANT CHANGE UP (ref 3.8–10.5)
WBC # FLD AUTO: 5.47 K/UL — SIGNIFICANT CHANGE UP (ref 3.8–10.5)

## 2019-11-05 PROCEDURE — 99233 SBSQ HOSP IP/OBS HIGH 50: CPT

## 2019-11-05 RX ORDER — MAGNESIUM SULFATE 500 MG/ML
2 VIAL (ML) INJECTION ONCE
Refills: 0 | Status: COMPLETED | OUTPATIENT
Start: 2019-11-05 | End: 2019-11-05

## 2019-11-05 RX ORDER — PANTOPRAZOLE SODIUM 20 MG/1
40 TABLET, DELAYED RELEASE ORAL
Refills: 0 | Status: DISCONTINUED | OUTPATIENT
Start: 2019-11-05 | End: 2019-11-05

## 2019-11-05 RX ORDER — PANTOPRAZOLE SODIUM 20 MG/1
40 TABLET, DELAYED RELEASE ORAL
Refills: 0 | Status: DISCONTINUED | OUTPATIENT
Start: 2019-11-05 | End: 2019-11-08

## 2019-11-05 RX ORDER — LACTULOSE 10 G/15ML
15 SOLUTION ORAL THREE TIMES A DAY
Refills: 0 | Status: DISCONTINUED | OUTPATIENT
Start: 2019-11-05 | End: 2019-11-08

## 2019-11-05 RX ADMIN — URSODIOL 300 MILLIGRAM(S): 250 TABLET, FILM COATED ORAL at 13:23

## 2019-11-05 RX ADMIN — LIDOCAINE 2 PATCH: 4 CREAM TOPICAL at 20:26

## 2019-11-05 RX ADMIN — Medication 1 GRAM(S): at 23:25

## 2019-11-05 RX ADMIN — Medication 40 MILLIEQUIVALENT(S): at 12:23

## 2019-11-05 RX ADMIN — Medication 60 MILLIGRAM(S): at 13:23

## 2019-11-05 RX ADMIN — Medication 1000 UNIT(S): at 12:23

## 2019-11-05 RX ADMIN — Medication 1 GRAM(S): at 18:12

## 2019-11-05 RX ADMIN — Medication 2: at 08:26

## 2019-11-05 RX ADMIN — URSODIOL 300 MILLIGRAM(S): 250 TABLET, FILM COATED ORAL at 06:05

## 2019-11-05 RX ADMIN — Medication 1 GRAM(S): at 06:06

## 2019-11-05 RX ADMIN — SENNA PLUS 2 TABLET(S): 8.6 TABLET ORAL at 23:24

## 2019-11-05 RX ADMIN — LACTULOSE 15 GRAM(S): 10 SOLUTION ORAL at 06:04

## 2019-11-05 RX ADMIN — Medication 250 MILLIGRAM(S): at 06:05

## 2019-11-05 RX ADMIN — Medication 50 GRAM(S): at 13:24

## 2019-11-05 RX ADMIN — LIDOCAINE 2 PATCH: 4 CREAM TOPICAL at 23:33

## 2019-11-05 RX ADMIN — Medication 2: at 16:52

## 2019-11-05 RX ADMIN — Medication 2: at 12:24

## 2019-11-05 RX ADMIN — NYSTATIN CREAM 1 APPLICATION(S): 100000 CREAM TOPICAL at 18:12

## 2019-11-05 RX ADMIN — PANTOPRAZOLE SODIUM 40 MILLIGRAM(S): 20 TABLET, DELAYED RELEASE ORAL at 06:04

## 2019-11-05 RX ADMIN — NYSTATIN CREAM 1 APPLICATION(S): 100000 CREAM TOPICAL at 06:05

## 2019-11-05 RX ADMIN — URSODIOL 300 MILLIGRAM(S): 250 TABLET, FILM COATED ORAL at 23:24

## 2019-11-05 RX ADMIN — TAMSULOSIN HYDROCHLORIDE 0.4 MILLIGRAM(S): 0.4 CAPSULE ORAL at 23:24

## 2019-11-05 RX ADMIN — LIDOCAINE 2 PATCH: 4 CREAM TOPICAL at 00:15

## 2019-11-05 RX ADMIN — CEFTRIAXONE 100 MILLIGRAM(S): 500 INJECTION, POWDER, FOR SOLUTION INTRAMUSCULAR; INTRAVENOUS at 12:22

## 2019-11-05 RX ADMIN — LACTULOSE 15 GRAM(S): 10 SOLUTION ORAL at 14:17

## 2019-11-05 RX ADMIN — Medication 1 GRAM(S): at 12:23

## 2019-11-05 RX ADMIN — ATORVASTATIN CALCIUM 20 MILLIGRAM(S): 80 TABLET, FILM COATED ORAL at 23:24

## 2019-11-05 RX ADMIN — LIDOCAINE 2 PATCH: 4 CREAM TOPICAL at 12:24

## 2019-11-05 RX ADMIN — LACTULOSE 15 GRAM(S): 10 SOLUTION ORAL at 23:23

## 2019-11-05 RX ADMIN — Medication 60 MILLIGRAM(S): at 23:23

## 2019-11-05 RX ADMIN — SPIRONOLACTONE 25 MILLIGRAM(S): 25 TABLET, FILM COATED ORAL at 06:05

## 2019-11-05 RX ADMIN — Medication 250 MILLIGRAM(S): at 18:12

## 2019-11-05 NOTE — PROGRESS NOTE ADULT - ASSESSMENT
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 He was found to be hypoglycemic ~29 and had a TMax ~102.6. He was fluid resuscitated, given Abx Zosyn/ Vanco and also one amp D50%, repeat FS ~ 69 for which he received another amp D50. Lactate was 4.1 ICU consulted for sepsis with fever, persistent hypoglycemia and hypotension. Patient takes Lantus 40U and Tradjenta 5mg at home which he was compliant with; but he has not been eating well. In the ED, he had a diagnostic paracentesis ~ 20cc bloody fluid was drained. Fluid cultures were negative. Found to have gram positive bacteremia and sepsis.  Downgraded to the hospitalist service for further management. CT abdomen and pelvis consistent with hepatomegaly and cirrhosis. ID was consulted, started on IV rocehin for Group B bacteremia for a total of 2 weeks with a midline.  GI was consulted; Hepatitis panel negative. Initially patient was anemic, planned for EGD. However Hb/hct stablized and patient had respiratory distress with elevated INR therefore plans for EGD were cancelled.  Started on IV diuresis for volume overload.    1. Group B bacteremia: Source unclear  - Repeat blood cultures on 10/23 were negative  - IV rocephin until 11/6/19  - LUE midline in place     2. Cirrhosis likely secondary to NAFLD  - Continue lasix, aldactone and metolazone   - Continue lactulose and rifaxamin  - PPI PO BID  - Hepatology transplant referral as an out patient (please arrange prior to discharge), as per GI note    3. MISA with volume overload  - Discussed with Dr. Yanez, increase in lasix and will likely not require UF  - Renal function stable on diuretics, monitor Cr  - Monitor daily weights and if volume status does not improve; may require Ultrafiltration  - Strict I/Os   - Renal ultrasound reviewed  - Continue flomax  - 24hr urine pending  - Nephrology following and will f/u with recommendations    4. Anemia: suspected secondary to GI bleed  - Hb stable s/p PRBCs during hospitalization  - No further episodes of bleeding; EGD deferred per GI  - Continue PPI and carafate    5. Acute urinary retention - resolved  - Beasley discontinued on 10/28  - Voiding well; Primafit in place   - Continue flomax     6. Thrombocytopenia likely secondary to cirrhosis   - Platelet counts remain stable    Dispo - Eventual discharge (24-48hrs) to [facility] on discharge when medically stable. 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 He was found to be hypoglycemic ~29 and had a TMax ~102.6. He was fluid resuscitated, given Abx Zosyn/ Vanco and also one amp D50%, repeat FS ~ 69 for which he received another amp D50. Lactate was 4.1 ICU consulted for sepsis with fever, persistent hypoglycemia and hypotension. Patient takes Lantus 40U and Tradjenta 5mg at home which he was compliant with; but he has not been eating well. In the ED, he had a diagnostic paracentesis ~ 20cc bloody fluid was drained. Fluid cultures were negative. Found to have gram positive bacteremia and sepsis.  Downgraded to the hospitalist service for further management. CT abdomen and pelvis consistent with hepatomegaly and cirrhosis. ID was consulted, started on IV rocehin for Group B bacteremia for a total of 2 weeks with a midline.  GI was consulted; Hepatitis panel negative. Initially patient was anemic, planned for EGD. However Hb/hct stablized and patient had respiratory distress with elevated INR therefore plans for EGD were cancelled.  Started on IV diuresis for volume overload.    1. Group B bacteremia: Source unclear  - Repeat blood cultures on 10/23 were negative  - IV rocephin until 11/6/19  - LUE midline in place     2. Cirrhosis likely secondary to NAFLD  - Continue lasix, aldactone and metolazone   - Ammonia noted; patient awake, alert. Continue lactulose and rifaxamin  - PPI PO BID  - Hepatology transplant referral as an out patient (please arrange prior to discharge), as per GI note    3. MISA with volume overload  - Discussed with Dr. Yanez, continue IV lasix at increased dose and will likely not require UF  - Rise in creatinine noted; monitor renal function closely  - Monitor daily weights   - Strict I/Os   - Renal ultrasound reviewed  - Continue flomax  - 24hr urine pending  - Nephrology following and will f/u with recommendations    4. Anemia: suspected secondary to GI bleed  - Hb stable s/p PRBCs during hospitalization  - No further episodes of bleeding; EGD deferred per GI  - Continue PPI and carafate    5. Acute urinary retention - resolved  - Beasley discontinued on 10/28  - Voiding well; Primafit in place   - Continue flomax     6. Thrombocytopenia likely secondary to cirrhosis   - Platelet counts remain stable    Dispo - Eventual discharge (24-48hrs) to [facility] on discharge when medically stable.

## 2019-11-05 NOTE — CHART NOTE - NSCHARTNOTEFT_GEN_A_CORE
Source: Patient [x ]  Family [ ]   other [ ]    Current Diet: Diet, Consistent Carbohydrate w/Evening Snack:   DASH/TLC {Sodium & Cholesteral Restricted}  1000mL Fluid Restriction ([id number]) (10-25-19 @ 13:11)        PO intake:  < 50% [ ]   50-75%  [x ]   %  [ ]  other :    Source for PO intake [x ] Patient [ ] family [ ] chart [ ] staff [ ] other    Current Weight:   11/5: 123.8kg  10/30: 124 kg   10/22: 136kg  10/24: 164kg     % Weight Change: :Unsure of accuracy of wt's secondary to inconsistency, will continue to monitor wt's for trends.     Pertinent Medications: MEDICATIONS  (STANDING):  atorvastatin 20 milliGRAM(s) Oral at bedtime  cefTRIAXone   IVPB 2000 milliGRAM(s) IV Intermittent <User Schedule>  cholecalciferol 1000 Unit(s) Oral daily  dextrose 5%. 1000 milliLiter(s) (50 mL/Hr) IV Continuous <Continuous>  dextrose 50% Injectable 12.5 Gram(s) IV Push once  dextrose 50% Injectable 25 Gram(s) IV Push once  dextrose 50% Injectable 25 Gram(s) IV Push once  furosemide   Injectable 60 milliGRAM(s) IV Push every 8 hours  influenza   Vaccine 0.5 milliLiter(s) IntraMuscular once  insulin lispro (HumaLOG) corrective regimen sliding scale   SubCutaneous three times a day before meals  lactulose Syrup 15 Gram(s) Oral three times a day  lidocaine   Patch 2 Patch Transdermal daily  magnesium sulfate  IVPB 2 Gram(s) IV Intermittent once  nystatin Ointment 1 Application(s) Topical two times a day  pantoprazole    Tablet 40 milliGRAM(s) Oral before breakfast  potassium chloride    Tablet ER 40 milliEquivalent(s) Oral daily  rifAXIMin 550 milliGRAM(s) Oral two times a day  saccharomyces boulardii 250 milliGRAM(s) Oral two times a day  senna 2 Tablet(s) Oral at bedtime  spironolactone 25 milliGRAM(s) Oral daily  sucralfate 1 Gram(s) Oral every 6 hours  tamsulosin 0.4 milliGRAM(s) Oral at bedtime  ursodiol Capsule 300 milliGRAM(s) Oral three times a day    MEDICATIONS  (PRN):  dextrose 40% Gel 15 Gram(s) Oral once PRN Blood Glucose LESS THAN 70 milliGRAM(s)/deciliter  glucagon  Injectable 1 milliGRAM(s) IntraMuscular once PRN Glucose LESS THAN 70 milligrams/deciliter  ondansetron Injectable 4 milliGRAM(s) IV Push every 6 hours PRN Nausea and/or Vomiting    Pertinent Labs: CBC Full  -  ( 05 Nov 2019 06:17 )  WBC Count : 5.47 K/uL  RBC Count : 2.60 M/uL  Hemoglobin : 8.8 g/dL  Hematocrit : 25.7 %  Platelet Count - Automated : 65 K/uL  Mean Cell Volume : 98.8 fl  Mean Cell Hemoglobin : 33.8 pg  Mean Cell Hemoglobin Concentration : 34.2 gm/dL  Auto Neutrophil # : 3.73 K/uL  Auto Lymphocyte # : 0.94 K/uL  Auto Monocyte # : 0.38 K/uL  Auto Eosinophil # : 0.14 K/uL  Auto Basophil # : 0.24 K/uL  Auto Neutrophil % : 68.1 %  Auto Lymphocyte % : 17.2 %  Auto Monocyte % : 6.9 %  Auto Eosinophil % : 2.6 %  Auto Basophil % : 4.3 %        Skin: No breakdown noted     Nutrition focused physical exam Previously conducted - found signs of malnutrition [ ]absent [ x]present    Subcutaneous fat loss: [x ] Orbital fat pads region, [x ]Buccal fat region, [ ]Triceps region,  [ ]Ribs region    Muscle wasting: [ x]Temples region, [ ]Clavicle region, [ ]Shoulder region, [ ]Scapula region, [ ]Interosseous region,  [ ]thigh region, [ ]Calf region    Estimated Needs:   [x ] no change since previous assessment  [ ] recalculated:     Current Nutrition Diagnosis:  Pt remains at high nutrition risk secondary to moderate-chronic protein calorie malnutrition related to inability to meet sufficient protein-calorie needs 2/2 persistent lack of appetite in setting of Cirrhosis, GERD, GIB as evidenced meeting <75% estimated energy needs >1 mo, severe fluid accumulation upon admission,(now improving). Pt reports appetite is improving, however is getting tired of menu choices, reviewed additional options with pt and obtained preferences which will be provided. Aware eventual D/C to [facility]. Recommendations below:     Recommendations:   1. Glucerna shake BID  2. Check wt daily to monitor trends  3. RX: MVI daily   4. Monitor labs/PO intake     Monitoring and Evaluation:   [x ] PO intake [x ] Tolerance to diet prescription [X] Weights  [X] Follow up per protocol [X] Labs:

## 2019-11-05 NOTE — PROGRESS NOTE ADULT - SUBJECTIVE AND OBJECTIVE BOX
CC: f/u persistent hypoglycemia  INTERVAL HPI/OVERNIGHT EVENTS: Patient seen and examined at bedside. No overnight events. Patient states that he feels better overall but is very tired. He didn't sleep well last night. Patient denies n/v/d, chest pain, and sob.     REVIEW OF SYSTEMS:  CONSTITUTIONAL: No fever, weight loss, or fatigue  EYES: No eye pain, visual disturbances, or discharge  ENT:  No difficulty hearing, tinnitus, vertigo; No sinus or throat pain  NECK: No pain or stiffness  RESPIRATORY: No cough, wheezing, chills or hemoptysis; No shortness of breath  CARDIOVASCULAR: No chest pain, palpitations, dizziness, or leg swelling  GASTROINTESTINAL: No abdominal or epigastric pain. No nausea, vomiting, or hematemesis; No diarrhea or constipation.  GENITOURINARY: No dysuria, frequency, hematuria, or incontinence  NEUROLOGICAL: No headaches, memory loss, loss of strength, numbness, or tremors  SKIN: No itching, burning, rashes, or lesions   MUSCULOSKELETAL: No joint pain or swelling; No muscle, back, or extremity pain    Allergies:  codeine (Anaphylaxis)    Intolerances:  NO  RED MEAT (Unknown)    HEALTH ISSUES - PROBLEM Dx:  Hepatic encephalopathy: Hepatic encephalopathy  Cirrhosis, non-alcoholic: Cirrhosis, non-alcoholic  Gastrointestinal hemorrhage, unspecified gastrointestinal hemorrhage type: Gastrointestinal hemorrhage, unspecified gastrointestinal hemorrhage type  Cirrhosis: Cirrhosis    PAST MEDICAL & SURGICAL HISTORY:  GIB (gastrointestinal bleeding)  GERD with esophagitis: Gastritis &amp; Non Bleeding Ulcers  Hepatic encephalopathy  Obesity  Fatty liver disease, nonalcoholic  Renal stones: 25 years ago  Hypertension  Neuropathy  Hypercholesteremia  Diabetes  S/P cholecystectomy    VITAL SIGNS:  T(C): 36.6 (11-05-19 @ 07:45), Max: 36.6 (11-05-19 @ 06:00)  HR: 83 (11-05-19 @ 07:45) (82 - 86)  BP: 108/66 (11-05-19 @ 07:45) (105/60 - 110/67)  RR: 18 (11-05-19 @ 07:45) (18 - 19)  SpO2: 95% (11-05-19 @ 06:00) (95% - 95%)  Wt(kg): --Vital Signs Last 24 Hrs  T(C): 36.6 (05 Nov 2019 07:45), Max: 36.6 (05 Nov 2019 06:00)  T(F): 97.8 (05 Nov 2019 07:45), Max: 97.9 (05 Nov 2019 06:00)  HR: 83 (05 Nov 2019 07:45) (82 - 86)  BP: 108/66 (05 Nov 2019 07:45) (105/60 - 110/67)  BP(mean): --  RR: 18 (05 Nov 2019 07:45) (18 - 19)  SpO2: 95% (05 Nov 2019 06:00) (95% - 95%)    PHYSICAL EXAM:  GENERAL: Pt lying in bed in NAD  HEAD:  Atraumatic, Normocephalic  EYES: EOMI, PERRL, conjunctiva and sclera clear  ENT: Moist mucous membranes  NECK: Supple, No JVD  CHEST/LUNG: Clear to auscultation bilaterally; No rales, rhonchi, wheezing, or rubs. Unlabored respirations  HEART: Regular rate and rhythm; No murmurs, rubs, or gallops  ABDOMEN: Bowel sounds present; Soft, Nontender, Distended. No guarding or rigidity. Scrotal edema improving. Prima fit in place.   EXTREMITIES: B/L LE 2+ Pitting edema- improving, peripheral pulses intact, No clubbing or cyanosis  NERVOUS SYSTEM:  Alert & Oriented X3, speech clear, FROM x 4 extremities. No deficits   SKIN: No rashes or lesions    LABS:                        8.8    5.47  )-----------( 65       ( 05 Nov 2019 06:17 )             25.7     11-05    132<L>  |  91<L>  |  39.0<H>  ----------------------------<  242<H>  3.9   |  31.0<H>  |  1.34<H>    Ca    9.1      05 Nov 2019 06:17  Phos  2.6     11-05  Mg     1.5     11-05      CAPILLARY BLOOD GLUCOSE  POCT Blood Glucose.: 221 mg/dL (05 Nov 2019 08:24)  POCT Blood Glucose.: 273 mg/dL (04 Nov 2019 21:43)  POCT Blood Glucose.: 207 mg/dL (04 Nov 2019 16:32)

## 2019-11-05 NOTE — PROGRESS NOTE ADULT - ASSESSMENT
MISA: improved cr 1.3 today  Concepcion+<30 c/w pre renal etiology (Hepatic cirrhosis)  (+)ATN from hypotension/sepsis==> resolved    Anasarca and pleural effusions  - cont Lasix watch potassium  - cont metolazone   - awaiting 24 hr urine protein- ordered yest  -Will consider RRT for UF if medical management cannot sufficiently impact fluid overload- first need to escalate diuretics and watch    Will follow MISA: improved cr 1.3 today  Concepcion+<30 c/w pre renal etiology (Hepatic cirrhosis)  (+)ATN from hypotension/sepsis==> resolved    Anasarca and pleural effusions  - cont Lasix w potassium supplements  - cont metolazone   - awaiting 24 hr urine protein- ordered yest  -Will consider RRT for UF if medical management cannot sufficiently impact fluid overload- first need to escalate diuretics and watch    Will follow

## 2019-11-05 NOTE — PROGRESS NOTE ADULT - SUBJECTIVE AND OBJECTIVE BOX
NEPHROLOGY INTERVAL HPI/OVERNIGHT EVENTS:    Examined  Remains edmatous but improved  Feeling better    MEDICATIONS  (STANDING):  atorvastatin 20 milliGRAM(s) Oral at bedtime  cefTRIAXone   IVPB 2000 milliGRAM(s) IV Intermittent <User Schedule>  cholecalciferol 1000 Unit(s) Oral daily  dextrose 5%. 1000 milliLiter(s) (50 mL/Hr) IV Continuous <Continuous>  dextrose 50% Injectable 12.5 Gram(s) IV Push once  dextrose 50% Injectable 25 Gram(s) IV Push once  dextrose 50% Injectable 25 Gram(s) IV Push once  furosemide   Injectable 60 milliGRAM(s) IV Push every 8 hours  influenza   Vaccine 0.5 milliLiter(s) IntraMuscular once  insulin lispro (HumaLOG) corrective regimen sliding scale   SubCutaneous three times a day before meals  lactulose Syrup 15 Gram(s) Oral three times a day  lidocaine   Patch 2 Patch Transdermal daily  nystatin Ointment 1 Application(s) Topical two times a day  pantoprazole    Tablet 40 milliGRAM(s) Oral before breakfast  potassium chloride    Tablet ER 40 milliEquivalent(s) Oral daily  rifAXIMin 550 milliGRAM(s) Oral two times a day  saccharomyces boulardii 250 milliGRAM(s) Oral two times a day  senna 2 Tablet(s) Oral at bedtime  spironolactone 25 milliGRAM(s) Oral daily  sucralfate 1 Gram(s) Oral every 6 hours  tamsulosin 0.4 milliGRAM(s) Oral at bedtime  ursodiol Capsule 300 milliGRAM(s) Oral three times a day    MEDICATIONS  (PRN):  dextrose 40% Gel 15 Gram(s) Oral once PRN Blood Glucose LESS THAN 70 milliGRAM(s)/deciliter  glucagon  Injectable 1 milliGRAM(s) IntraMuscular once PRN Glucose LESS THAN 70 milligrams/deciliter  ondansetron Injectable 4 milliGRAM(s) IV Push every 6 hours PRN Nausea and/or Vomiting      Allergies    codeine (Anaphylaxis)    Intolerances    NO  RED MEAT (Unknown)      Vital Signs Last 24 Hrs  T(C): 36.6 (2019 07:45), Max: 36.6 (2019 06:00)  T(F): 97.8 (2019 07:45), Max: 97.9 (2019 06:00)  HR: 83 (2019 07:45) (82 - 86)  BP: 108/66 (2019 07:45) (105/60 - 110/67)  BP(mean): --  RR: 18 (2019 07:45) (18 - 19)  SpO2: 95% (2019 06:00) (95% - 95%)  Daily     Daily Weight in k.8 (2019 07:45)    PHYSICAL EXAM:  GENERAL: Obese; chronically debilitated  EYES:  conjunctiva and sclera clear  NECK: Supple, No JVD/Carotid Bruit  NERVOUS SYSTEM:  A/O x3, no asterixis  Lungs: Diminished BS at bases   HEART:  No murmur,  No rub  ABDOMEN: Soft, NT/ND BS+; Ascites +  EXTREMITIES: + anasarca, scrotal edema    LABS:                        8.8    5.47  )-----------( 65       ( 2019 06:17 )             25.7     11-05    132<L>  |  91<L>  |  39.0<H>  ----------------------------<  242<H>  3.9   |  31.0<H>  |  1.34<H>    Ca    9.1      2019 06:17  Phos  2.6       Mg     1.5               Magnesium, Serum: 1.5 mg/dL ( @ 06:17)  Phosphorus Level, Serum: 2.6 mg/dL ( @ 06:17)          RADIOLOGY & ADDITIONAL TESTS:

## 2019-11-06 LAB
ANION GAP SERPL CALC-SCNC: 11 MMOL/L — SIGNIFICANT CHANGE UP (ref 5–17)
BASOPHILS # BLD AUTO: 0.07 K/UL — SIGNIFICANT CHANGE UP (ref 0–0.2)
BASOPHILS NFR BLD AUTO: 1.5 % — SIGNIFICANT CHANGE UP (ref 0–2)
BUN SERPL-MCNC: 37 MG/DL — HIGH (ref 8–20)
CALCIUM SERPL-MCNC: 9.4 MG/DL — SIGNIFICANT CHANGE UP (ref 8.6–10.2)
CHLORIDE SERPL-SCNC: 88 MMOL/L — LOW (ref 98–107)
CO2 SERPL-SCNC: 33 MMOL/L — HIGH (ref 22–29)
CREAT SERPL-MCNC: 1.2 MG/DL — SIGNIFICANT CHANGE UP (ref 0.5–1.3)
EOSINOPHIL # BLD AUTO: 0.22 K/UL — SIGNIFICANT CHANGE UP (ref 0–0.5)
EOSINOPHIL NFR BLD AUTO: 4.7 % — SIGNIFICANT CHANGE UP (ref 0–6)
GLUCOSE BLDC GLUCOMTR-MCNC: 179 MG/DL — HIGH (ref 70–99)
GLUCOSE BLDC GLUCOMTR-MCNC: 185 MG/DL — HIGH (ref 70–99)
GLUCOSE BLDC GLUCOMTR-MCNC: 213 MG/DL — HIGH (ref 70–99)
GLUCOSE BLDC GLUCOMTR-MCNC: 255 MG/DL — HIGH (ref 70–99)
GLUCOSE SERPL-MCNC: 194 MG/DL — HIGH (ref 70–115)
HCT VFR BLD CALC: 26.6 % — LOW (ref 39–50)
HGB BLD-MCNC: 9 G/DL — LOW (ref 13–17)
IMM GRANULOCYTES NFR BLD AUTO: 0.2 % — SIGNIFICANT CHANGE UP (ref 0–1.5)
LYMPHOCYTES # BLD AUTO: 1.14 K/UL — SIGNIFICANT CHANGE UP (ref 1–3.3)
LYMPHOCYTES # BLD AUTO: 24.2 % — SIGNIFICANT CHANGE UP (ref 13–44)
MAGNESIUM SERPL-MCNC: 1.5 MG/DL — LOW (ref 1.6–2.6)
MCHC RBC-ENTMCNC: 33.7 PG — SIGNIFICANT CHANGE UP (ref 27–34)
MCHC RBC-ENTMCNC: 33.8 GM/DL — SIGNIFICANT CHANGE UP (ref 32–36)
MCV RBC AUTO: 99.6 FL — SIGNIFICANT CHANGE UP (ref 80–100)
MONOCYTES # BLD AUTO: 0.81 K/UL — SIGNIFICANT CHANGE UP (ref 0–0.9)
MONOCYTES NFR BLD AUTO: 17.2 % — HIGH (ref 2–14)
NEUTROPHILS # BLD AUTO: 2.47 K/UL — SIGNIFICANT CHANGE UP (ref 1.8–7.4)
NEUTROPHILS NFR BLD AUTO: 52.2 % — SIGNIFICANT CHANGE UP (ref 43–77)
PHOSPHATE SERPL-MCNC: 2.8 MG/DL — SIGNIFICANT CHANGE UP (ref 2.4–4.7)
PLATELET # BLD AUTO: 64 K/UL — LOW (ref 150–400)
POTASSIUM SERPL-MCNC: 3.8 MMOL/L — SIGNIFICANT CHANGE UP (ref 3.5–5.3)
POTASSIUM SERPL-SCNC: 3.8 MMOL/L — SIGNIFICANT CHANGE UP (ref 3.5–5.3)
RBC # BLD: 2.67 M/UL — LOW (ref 4.2–5.8)
RBC # FLD: 20.4 % — HIGH (ref 10.3–14.5)
SODIUM SERPL-SCNC: 132 MMOL/L — LOW (ref 135–145)
WBC # BLD: 4.72 K/UL — SIGNIFICANT CHANGE UP (ref 3.8–10.5)
WBC # FLD AUTO: 4.72 K/UL — SIGNIFICANT CHANGE UP (ref 3.8–10.5)

## 2019-11-06 PROCEDURE — 99233 SBSQ HOSP IP/OBS HIGH 50: CPT

## 2019-11-06 RX ORDER — MAGNESIUM SULFATE 500 MG/ML
2 VIAL (ML) INJECTION ONCE
Refills: 0 | Status: COMPLETED | OUTPATIENT
Start: 2019-11-06 | End: 2019-11-06

## 2019-11-06 RX ORDER — FUROSEMIDE 40 MG
80 TABLET ORAL
Refills: 0 | Status: DISCONTINUED | OUTPATIENT
Start: 2019-11-06 | End: 2019-11-08

## 2019-11-06 RX ADMIN — Medication 1000 UNIT(S): at 11:32

## 2019-11-06 RX ADMIN — Medication 250 MILLIGRAM(S): at 06:08

## 2019-11-06 RX ADMIN — Medication 50 GRAM(S): at 17:41

## 2019-11-06 RX ADMIN — Medication 250 MILLIGRAM(S): at 17:26

## 2019-11-06 RX ADMIN — LACTULOSE 15 GRAM(S): 10 SOLUTION ORAL at 06:08

## 2019-11-06 RX ADMIN — SPIRONOLACTONE 25 MILLIGRAM(S): 25 TABLET, FILM COATED ORAL at 06:08

## 2019-11-06 RX ADMIN — LIDOCAINE 2 PATCH: 4 CREAM TOPICAL at 11:34

## 2019-11-06 RX ADMIN — CEFTRIAXONE 100 MILLIGRAM(S): 500 INJECTION, POWDER, FOR SOLUTION INTRAMUSCULAR; INTRAVENOUS at 11:33

## 2019-11-06 RX ADMIN — Medication 1 GRAM(S): at 11:33

## 2019-11-06 RX ADMIN — Medication 1: at 11:47

## 2019-11-06 RX ADMIN — Medication 40 MILLIEQUIVALENT(S): at 11:33

## 2019-11-06 RX ADMIN — TAMSULOSIN HYDROCHLORIDE 0.4 MILLIGRAM(S): 0.4 CAPSULE ORAL at 21:46

## 2019-11-06 RX ADMIN — ATORVASTATIN CALCIUM 20 MILLIGRAM(S): 80 TABLET, FILM COATED ORAL at 21:46

## 2019-11-06 RX ADMIN — Medication 2: at 17:24

## 2019-11-06 RX ADMIN — SENNA PLUS 2 TABLET(S): 8.6 TABLET ORAL at 21:46

## 2019-11-06 RX ADMIN — URSODIOL 300 MILLIGRAM(S): 250 TABLET, FILM COATED ORAL at 21:46

## 2019-11-06 RX ADMIN — NYSTATIN CREAM 1 APPLICATION(S): 100000 CREAM TOPICAL at 17:25

## 2019-11-06 RX ADMIN — Medication 80 MILLIGRAM(S): at 21:46

## 2019-11-06 RX ADMIN — Medication 60 MILLIGRAM(S): at 14:11

## 2019-11-06 RX ADMIN — Medication 1: at 07:51

## 2019-11-06 RX ADMIN — LIDOCAINE 2 PATCH: 4 CREAM TOPICAL at 23:10

## 2019-11-06 RX ADMIN — Medication 1 GRAM(S): at 06:08

## 2019-11-06 RX ADMIN — URSODIOL 300 MILLIGRAM(S): 250 TABLET, FILM COATED ORAL at 06:08

## 2019-11-06 RX ADMIN — NYSTATIN CREAM 1 APPLICATION(S): 100000 CREAM TOPICAL at 06:08

## 2019-11-06 RX ADMIN — LACTULOSE 15 GRAM(S): 10 SOLUTION ORAL at 21:46

## 2019-11-06 RX ADMIN — PANTOPRAZOLE SODIUM 40 MILLIGRAM(S): 20 TABLET, DELAYED RELEASE ORAL at 06:08

## 2019-11-06 RX ADMIN — LIDOCAINE 2 PATCH: 4 CREAM TOPICAL at 21:50

## 2019-11-06 RX ADMIN — LACTULOSE 15 GRAM(S): 10 SOLUTION ORAL at 13:53

## 2019-11-06 RX ADMIN — Medication 1 GRAM(S): at 17:25

## 2019-11-06 RX ADMIN — URSODIOL 300 MILLIGRAM(S): 250 TABLET, FILM COATED ORAL at 13:53

## 2019-11-06 NOTE — PROGRESS NOTE ADULT - ASSESSMENT
MISA: improved cr 1.3 today  Concepcion+<30 c/w pre renal etiology (Hepatic cirrhosis)  (+)ATN from hypotension/sepsis==> resolved    Anasarca and pleural effusions  - cont Lasix w potassium supplements - changd to PO now and BID  - cont metolazone   - awaiting 24 hr urine protein- ordered yest  -No need for  RRT as responding t diuretics well  Will follow

## 2019-11-06 NOTE — PROGRESS NOTE ADULT - ASSESSMENT
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 He was found to be hypoglycemic ~29 and had a TMax ~102.6. He was fluid resuscitated, given Abx Zosyn/ Vanco and also one amp D50%, repeat FS ~ 69 for which he received another amp D50. Lactate was 4.1 ICU consulted for sepsis with fever, persistent hypoglycemia and hypotension. Patient takes Lantus 40U and Tradjenta 5mg at home which he was compliant with; but he has not been eating well. In the ED, he had a diagnostic paracentesis ~ 20cc bloody fluid was drained. Fluid cultures were negative. Found to have gram positive bacteremia and sepsis.  Downgraded to the hospitalist service for further management. CT abdomen and pelvis consistent with hepatomegaly and cirrhosis. ID was consulted, started on IV rocehin for Group B bacteremia for a total of 2 weeks with a midline.  GI was consulted; Hepatitis panel negative. Initially patient was anemic, planned for EGD. However Hb/hct stablized and patient had respiratory distress with elevated INR therefore plans for EGD were cancelled.  Started on IV diuresis for volume overload.    1. Group B bacteremia: Source unclear  - Repeat blood cultures on 10/23 were negative  - IV rocephin until 11/6/19  - LUE midline in place     2. Cirrhosis likely secondary to NAFLD  - Continue lasix, aldactone and metolazone   - Ammonia noted; patient awake, alert. Continue lactulose and rifaxamin  - PPI PO BID  - Hepatology transplant referral as an out patient (please arrange prior to discharge), as per GI note    3. MISA with volume overload  - Discussed with Dr. Yanez, continue IV lasix at increased dose and will likely not require UF  - Monitor creatine, currently stable  - Monitor daily weights   - Strict I/Os   - Renal ultrasound reviewed  - Continue flomax  - 24hr urine pending  - Nephrology following and will f/u with recommendations    4. Anemia: suspected secondary to GI bleed  - Hb stable s/p PRBCs during hospitalization  - No further episodes of bleeding; EGD deferred per GI  - Continue PPI and carafate    5. Acute urinary retention - resolved  - Beasley discontinued on 10/28  - Voiding well; Primafit in place   - Continue flomax     6. Thrombocytopenia likely secondary to cirrhosis   - Platelet counts remain stable    Dispo - Eventual discharge (24-48hrs) to [facility] on discharge when medically stable. Awaiting final evaluation from nephrology. 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 He was found to be hypoglycemic ~29 and had a TMax ~102.6. He was fluid resuscitated, given Abx Zosyn/ Vanco and also one amp D50%, repeat FS ~ 69 for which he received another amp D50. Lactate was 4.1 ICU consulted for sepsis with fever, persistent hypoglycemia and hypotension. Patient takes Lantus 40U and Tradjenta 5mg at home which he was compliant with; but he has not been eating well. In the ED, he had a diagnostic paracentesis ~ 20cc bloody fluid was drained. Fluid cultures were negative. Found to have gram positive bacteremia and sepsis.  Downgraded to the hospitalist service for further management. CT abdomen and pelvis consistent with hepatomegaly and cirrhosis. ID was consulted, started on IV rocehin for Group B bacteremia for a total of 2 weeks with a midline.  GI was consulted; Hepatitis panel negative. Initially patient was anemic, planned for EGD. However Hb/hct stablized and patient had respiratory distress with elevated INR therefore plans for EGD were cancelled.  Started on IV diuresis for volume overload.    1. Group B bacteremia: Source unclear  - Repeat blood cultures on 10/23 were negative  - IV rocephin until 11/6/19  - LUE midline in place     2. Cirrhosis likely secondary to NAFLD  - Switch IV lasix to PO; continue aldactone and metolazone   - Ammonia noted; patient awake, alert. Continue lactulose and rifaxamin  - PPI PO BID  - Hepatology transplant referral as an out patient (please arrange prior to discharge), as per GI note    3. MISA with volume overload  - Discussed with Dr. Clinton, switch to PO lasix and monitor response  - Monitor creatine, currently stable  - Monitor daily weights   - Strict I/Os   - Renal ultrasound reviewed  - Continue flomax  - 24hr urine pending  - Nephrology following      4. Anemia: suspected secondary to GI bleed  - Hb stable s/p PRBCs during hospitalization  - No further episodes of bleeding; EGD deferred per GI  - Continue PPI and carafate    5. Acute urinary retention - resolved  - Beasley discontinued on 10/28  - Voiding well; Primafit in place   - Continue flomax     6. Thrombocytopenia likely secondary to cirrhosis   - Platelet counts remain stable    Dispo - Possible discharge to [facility] in 24 hours pending renal clearance. 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 He was found to be hypoglycemic ~29 and had a TMax ~102.6. He was fluid resuscitated, given Abx Zosyn/ Vanco and also one amp D50%, repeat FS ~ 69 for which he received another amp D50. Lactate was 4.1 ICU consulted for sepsis with fever, persistent hypoglycemia and hypotension. Patient takes Lantus 40U and Tradjenta 5mg at home which he was compliant with; but he has not been eating well. In the ED, he had a diagnostic paracentesis ~ 20cc bloody fluid was drained. Fluid cultures were negative. Found to have gram positive bacteremia and sepsis.  Downgraded to the hospitalist service for further management. CT abdomen and pelvis consistent with hepatomegaly and cirrhosis. ID was consulted, started on IV rocehin for Group B bacteremia for a total of 2 weeks with a midline.  GI was consulted; Hepatitis panel negative. Initially patient was anemic, planned for EGD. However Hb/hct stablized and patient had respiratory distress with elevated INR therefore plans for EGD were cancelled.  Started on IV diuresis for volume overload.    1. Group B bacteremia: Source unclear  - Repeat blood cultures on 10/23 were negative  - IV rocephin until 11/6/19; last dose today  - LUE midline in place     2. Cirrhosis likely secondary to NAFLD  - Switch IV lasix to PO; continue aldactone and metolazone   - Ammonia noted; patient awake, alert. Continue lactulose and rifaxamin  - PPI PO BID  - Hepatology transplant referral as an out patient (please arrange prior to discharge), as per GI note    3. MISA with volume overload  - Discussed with Dr. Clinton, switch to PO lasix and monitor response  - Monitor creatine, currently stable  - Monitor daily weights   - Strict I/Os   - Renal ultrasound reviewed  - Continue flomax  - 24hr urine pending  - Nephrology following      4. Anemia: suspected secondary to GI bleed  - Hb stable s/p PRBCs during hospitalization  - No further episodes of bleeding; EGD deferred per GI  - Continue PPI and carafate    5. Acute urinary retention - resolved  - Beasley discontinued on 10/28  - Voiding well; Primafit in place   - Continue flomax     6. Thrombocytopenia likely secondary to cirrhosis   - Platelet counts remain stable    Dispo - Possible discharge to [facility] in 24 hours pending renal clearance.

## 2019-11-06 NOTE — PROGRESS NOTE ADULT - SUBJECTIVE AND OBJECTIVE BOX
Patient seen and examined    I&O's Summary    05 Nov 2019 07:01  -  06 Nov 2019 07:00  --------------------------------------------------------  IN: 240 mL / OUT: 3400 mL / NET: -3160 mL    06 Nov 2019 07:01  -  06 Nov 2019 15:52  --------------------------------------------------------  IN: 770 mL / OUT: 1200 mL / NET: -430 mL    Feels much better  Still not OOB    REVIEW OF SYSTEMS:    CONSTITUTIONAL: No F/C  RESPIRATORY: No cough,  No SOB  CARDIOVASCULAR: No CP/palpitations,    GASTROINTESTINAL: No abdominal pain  or NVD   GENITOURINARY: No UTI sx; scrotal swelling less  NEUROLOGICAL: No headaches, NO wk/numbness  MUSCULOSKELETAL:   EXTREMITIES : Mod LE swelling,    Vital Signs Last 24 Hrs  T(C): 36.6 (06 Nov 2019 08:00), Max: 36.6 (05 Nov 2019 16:43)  T(F): 97.9 (06 Nov 2019 08:00), Max: 97.9 (06 Nov 2019 08:00)  HR: 79 (06 Nov 2019 08:00) (79 - 83)  BP: 122/52 (06 Nov 2019 14:11) (100/55 - 122/52)  BP(mean): --  RR: 18 (06 Nov 2019 14:11) (18 - 18)  SpO2: 92% (06 Nov 2019 14:11) (91% - 95%)    PHYSICAL EXAM:    GENERAL: NAD,   EYES:  conjunctiva and sclera clear  NECK: Supple, No JVD/Bruit  NERVOUS SYSTEM:  A/O x3,   CHEST:  No rales or rhonchi  HEART:  RRR, No murmur  ABDOMEN: Soft, NT/ND BS+  EXTREMITIES:  1-2+ LE Edema; scrotal edema much less  SKIN: No rashes    LABS:                          9.0    4.72  )-----------( 64       ( 06 Nov 2019 08:29 )             26.6     11-06    132<L>  |  88<L>  |  37.0<H>  ----------------------------<  194<H>  3.8   |  33.0<H>  |  1.20    Ca    9.4      06 Nov 2019 08:29  Phos  2.8     11-06  Mg     1.5     11-06        MEDICATIONS  (STANDING):  atorvastatin  cefTRIAXone   IVPB  cholecalciferol  dextrose 40% Gel PRN  dextrose 5%.  dextrose 50% Injectable  dextrose 50% Injectable  dextrose 50% Injectable  furosemide    Tablet  glucagon  Injectable PRN  influenza   Vaccine  insulin lispro (HumaLOG) corrective regimen sliding scale  lactulose Syrup  lidocaine   Patch  magnesium sulfate  IVPB  nystatin Ointment  ondansetron Injectable PRN  pantoprazole    Tablet  potassium chloride    Tablet ER  rifAXIMin  saccharomyces boulardii  senna  spironolactone  sucralfate  tamsulosin  ursodiol Capsule

## 2019-11-07 LAB
ANION GAP SERPL CALC-SCNC: 12 MMOL/L — SIGNIFICANT CHANGE UP (ref 5–17)
ANISOCYTOSIS BLD QL: SLIGHT — SIGNIFICANT CHANGE UP
BASOPHILS # BLD AUTO: 0.07 K/UL — SIGNIFICANT CHANGE UP (ref 0–0.2)
BASOPHILS NFR BLD AUTO: 1.5 % — SIGNIFICANT CHANGE UP (ref 0–2)
BUN SERPL-MCNC: 42 MG/DL — HIGH (ref 8–20)
CALCIUM SERPL-MCNC: 9 MG/DL — SIGNIFICANT CHANGE UP (ref 8.6–10.2)
CHLORIDE SERPL-SCNC: 88 MMOL/L — LOW (ref 98–107)
CO2 SERPL-SCNC: 32 MMOL/L — HIGH (ref 22–29)
CREAT SERPL-MCNC: 1.13 MG/DL — SIGNIFICANT CHANGE UP (ref 0.5–1.3)
DACRYOCYTES BLD QL SMEAR: SLIGHT — SIGNIFICANT CHANGE UP
ELLIPTOCYTES BLD QL SMEAR: SLIGHT — SIGNIFICANT CHANGE UP
EOSINOPHIL # BLD AUTO: 0.13 K/UL — SIGNIFICANT CHANGE UP (ref 0–0.5)
EOSINOPHIL NFR BLD AUTO: 2.8 % — SIGNIFICANT CHANGE UP (ref 0–6)
GIANT PLATELETS BLD QL SMEAR: PRESENT — SIGNIFICANT CHANGE UP
GLUCOSE BLDC GLUCOMTR-MCNC: 204 MG/DL — HIGH (ref 70–99)
GLUCOSE BLDC GLUCOMTR-MCNC: 217 MG/DL — HIGH (ref 70–99)
GLUCOSE BLDC GLUCOMTR-MCNC: 226 MG/DL — HIGH (ref 70–99)
GLUCOSE BLDC GLUCOMTR-MCNC: 318 MG/DL — HIGH (ref 70–99)
GLUCOSE SERPL-MCNC: 228 MG/DL — HIGH (ref 70–115)
HCT VFR BLD CALC: 26.1 % — LOW (ref 39–50)
HGB BLD-MCNC: 8.7 G/DL — LOW (ref 13–17)
HYPOCHROMIA BLD QL: SLIGHT — SIGNIFICANT CHANGE UP
IMM GRANULOCYTES NFR BLD AUTO: 0.4 % — SIGNIFICANT CHANGE UP (ref 0–1.5)
LYMPHOCYTES # BLD AUTO: 1.04 K/UL — SIGNIFICANT CHANGE UP (ref 1–3.3)
LYMPHOCYTES # BLD AUTO: 22.3 % — SIGNIFICANT CHANGE UP (ref 13–44)
MACROCYTES BLD QL: SLIGHT — SIGNIFICANT CHANGE UP
MAGNESIUM SERPL-MCNC: 1.6 MG/DL — SIGNIFICANT CHANGE UP (ref 1.6–2.6)
MANUAL SMEAR VERIFICATION: SIGNIFICANT CHANGE UP
MCHC RBC-ENTMCNC: 32.6 PG — SIGNIFICANT CHANGE UP (ref 27–34)
MCHC RBC-ENTMCNC: 33.3 GM/DL — SIGNIFICANT CHANGE UP (ref 32–36)
MCV RBC AUTO: 97.8 FL — SIGNIFICANT CHANGE UP (ref 80–100)
METAMYELOCYTES # FLD: 0.9 % — HIGH (ref 0–0)
MICROCYTES BLD QL: SLIGHT — SIGNIFICANT CHANGE UP
MONOCYTES # BLD AUTO: 0.81 K/UL — SIGNIFICANT CHANGE UP (ref 0–0.9)
MONOCYTES NFR BLD AUTO: 17.4 % — HIGH (ref 2–14)
NEUTROPHILS # BLD AUTO: 2.59 K/UL — SIGNIFICANT CHANGE UP (ref 1.8–7.4)
NEUTROPHILS NFR BLD AUTO: 55.6 % — SIGNIFICANT CHANGE UP (ref 43–77)
OVALOCYTES BLD QL SMEAR: SLIGHT — SIGNIFICANT CHANGE UP
PHOSPHATE SERPL-MCNC: 2.9 MG/DL — SIGNIFICANT CHANGE UP (ref 2.4–4.7)
PLAT MORPH BLD: NORMAL — SIGNIFICANT CHANGE UP
PLATELET # BLD AUTO: 61 K/UL — LOW (ref 150–400)
PLATELET COUNT - ESTIMATE: ABNORMAL
POIKILOCYTOSIS BLD QL AUTO: SLIGHT — SIGNIFICANT CHANGE UP
POTASSIUM SERPL-MCNC: 4.1 MMOL/L — SIGNIFICANT CHANGE UP (ref 3.5–5.3)
POTASSIUM SERPL-SCNC: 4.1 MMOL/L — SIGNIFICANT CHANGE UP (ref 3.5–5.3)
RBC # BLD: 2.67 M/UL — LOW (ref 4.2–5.8)
RBC # FLD: 20.5 % — HIGH (ref 10.3–14.5)
RBC BLD AUTO: NORMAL — SIGNIFICANT CHANGE UP
SODIUM SERPL-SCNC: 132 MMOL/L — LOW (ref 135–145)
STOMATOCYTES BLD QL SMEAR: SLIGHT — SIGNIFICANT CHANGE UP
VARIANT LYMPHS # BLD: 1.7 % — SIGNIFICANT CHANGE UP (ref 0–6)
WBC # BLD: 4.66 K/UL — SIGNIFICANT CHANGE UP (ref 3.8–10.5)
WBC # FLD AUTO: 4.66 K/UL — SIGNIFICANT CHANGE UP (ref 3.8–10.5)

## 2019-11-07 PROCEDURE — 99232 SBSQ HOSP IP/OBS MODERATE 35: CPT

## 2019-11-07 RX ORDER — INSULIN LISPRO 100/ML
4 VIAL (ML) SUBCUTANEOUS ONCE
Refills: 0 | Status: COMPLETED | OUTPATIENT
Start: 2019-11-07 | End: 2019-11-07

## 2019-11-07 RX ADMIN — NYSTATIN CREAM 1 APPLICATION(S): 100000 CREAM TOPICAL at 17:15

## 2019-11-07 RX ADMIN — Medication 2: at 08:32

## 2019-11-07 RX ADMIN — SENNA PLUS 2 TABLET(S): 8.6 TABLET ORAL at 21:30

## 2019-11-07 RX ADMIN — Medication 1 GRAM(S): at 21:30

## 2019-11-07 RX ADMIN — URSODIOL 300 MILLIGRAM(S): 250 TABLET, FILM COATED ORAL at 05:34

## 2019-11-07 RX ADMIN — LACTULOSE 15 GRAM(S): 10 SOLUTION ORAL at 17:15

## 2019-11-07 RX ADMIN — NYSTATIN CREAM 1 APPLICATION(S): 100000 CREAM TOPICAL at 05:34

## 2019-11-07 RX ADMIN — Medication 4 UNIT(S): at 21:29

## 2019-11-07 RX ADMIN — ATORVASTATIN CALCIUM 20 MILLIGRAM(S): 80 TABLET, FILM COATED ORAL at 21:30

## 2019-11-07 RX ADMIN — Medication 80 MILLIGRAM(S): at 05:34

## 2019-11-07 RX ADMIN — Medication 2: at 17:16

## 2019-11-07 RX ADMIN — Medication 1000 UNIT(S): at 11:54

## 2019-11-07 RX ADMIN — LACTULOSE 15 GRAM(S): 10 SOLUTION ORAL at 21:29

## 2019-11-07 RX ADMIN — Medication 1 GRAM(S): at 00:07

## 2019-11-07 RX ADMIN — SPIRONOLACTONE 25 MILLIGRAM(S): 25 TABLET, FILM COATED ORAL at 05:34

## 2019-11-07 RX ADMIN — URSODIOL 300 MILLIGRAM(S): 250 TABLET, FILM COATED ORAL at 21:30

## 2019-11-07 RX ADMIN — Medication 1 GRAM(S): at 05:34

## 2019-11-07 RX ADMIN — Medication 1 GRAM(S): at 17:14

## 2019-11-07 RX ADMIN — Medication 250 MILLIGRAM(S): at 17:15

## 2019-11-07 RX ADMIN — LACTULOSE 15 GRAM(S): 10 SOLUTION ORAL at 05:34

## 2019-11-07 RX ADMIN — LIDOCAINE 2 PATCH: 4 CREAM TOPICAL at 20:39

## 2019-11-07 RX ADMIN — Medication 80 MILLIGRAM(S): at 17:17

## 2019-11-07 RX ADMIN — LIDOCAINE 2 PATCH: 4 CREAM TOPICAL at 11:54

## 2019-11-07 RX ADMIN — URSODIOL 300 MILLIGRAM(S): 250 TABLET, FILM COATED ORAL at 17:14

## 2019-11-07 RX ADMIN — Medication 250 MILLIGRAM(S): at 05:34

## 2019-11-07 RX ADMIN — TAMSULOSIN HYDROCHLORIDE 0.4 MILLIGRAM(S): 0.4 CAPSULE ORAL at 21:30

## 2019-11-07 RX ADMIN — Medication 40 MILLIEQUIVALENT(S): at 11:54

## 2019-11-07 RX ADMIN — Medication 1 GRAM(S): at 11:54

## 2019-11-07 RX ADMIN — Medication 2: at 11:53

## 2019-11-07 RX ADMIN — PANTOPRAZOLE SODIUM 40 MILLIGRAM(S): 20 TABLET, DELAYED RELEASE ORAL at 05:34

## 2019-11-07 NOTE — PROGRESS NOTE ADULT - SUBJECTIVE AND OBJECTIVE BOX
Patient seen and examined    I&O's Summary    06 Nov 2019 07:01  -  07 Nov 2019 07:00  --------------------------------------------------------  IN: 220 mL / OUT: 2925 mL / NET: -2705 mL    07 Nov 2019 07:01  -  07 Nov 2019 15:36  --------------------------------------------------------  IN: 560 mL / OUT: 1320 mL / NET: -760 mL      Feels much better  was OOB/Ch ; Had some PT    REVIEW OF SYSTEMS:    CONSTITUTIONAL: No F/C  RESPIRATORY: No cough,  No SOB  CARDIOVASCULAR: No CP/palpitations,    GASTROINTESTINAL: No abdominal pain  or NVD   GENITOURINARY: No UTI sx; scrotal swelling less  NEUROLOGICAL: No headaches, NO wk/numbness  MUSCULOSKELETAL:   EXTREMITIES : Mod LE swelling,    Vital Signs Last 24 Hrs  T(C): 36.7 (11-07-19 @ 12:00), Max: 36.8 (11-07-19 @ 05:13)  T(F): 98.1 (11-07-19 @ 12:00), Max: 98.3 (11-07-19 @ 08:25)  HR: 90 (11-07-19 @ 08:25) (90 - 93)  BP: 107/60 (11-07-19 @ 12:00) (101/58 - 121/56)  BP(mean): --  RR: 20 (11-07-19 @ 12:00) (18 - 90)  SpO2: 92% (11-07-19 @ 12:00) (90% - 93%)  PHYSICAL EXAM:    GENERAL: NAD,   EYES:  conjunctiva and sclera clear  NECK: Supple, No JVD/Bruit  NERVOUS SYSTEM:  A/O x3,   CHEST:  No rales or rhonchi  HEART:  RRR, No murmur  ABDOMEN: Soft, NT/ND BS+  EXTREMITIES:  1-2+ LE Edema; scrotal edema much less  SKIN: No rashes    LABS:                        8.7    4.66  )-----------( 61       ( 07 Nov 2019 08:16 )             26.1                           9.0    4.72  )-----------( 64       ( 06 Nov 2019 08:29 )             26.6     11-06    132<L>  |  88<L>  |  37.0<H>  ----------------------------<  194<H>  3.8   |  33.0<H>  |  1.20    Ca    9.4      06 Nov 2019 08:29  Phos  2.8     11-06  Mg     1.5     11-06    11-07    132<L>  |  88<L>  |  42.0<H>  ----------------------------<  228<H>  4.1   |  32.0<H>  |  1.13    Ca    9.0      07 Nov 2019 08:03  Phos  2.9     11-07  Mg     1.6     11-07        MEDICATIONS  (STANDING):  atorvastatin  cefTRIAXone   IVPB  cholecalciferol  dextrose 40% Gel PRN  dextrose 5%.  dextrose 50% Injectable  dextrose 50% Injectable  dextrose 50% Injectable  furosemide    Tablet  glucagon  Injectable PRN  influenza   Vaccine  insulin lispro (HumaLOG) corrective regimen sliding scale  lactulose Syrup  lidocaine   Patch  magnesium sulfate  IVPB  nystatin Ointment  ondansetron Injectable PRN  pantoprazole    Tablet  potassium chloride    Tablet ER  rifAXIMin  saccharomyces boulardii  senna  spironolactone  sucralfate  tamsulosin  ursodiol Capsule

## 2019-11-07 NOTE — PROGRESS NOTE ADULT - SUBJECTIVE AND OBJECTIVE BOX
CHIEF COMPLAINT/INTERVAL HISTORY:    Patient is a 63y old  Male who presents with a chief complaint of Persistent hypoglycemia (07 Nov 2019 15:36)    SUBJECTIVE & OBJECTIVE: Pt seen and examined at bedside. No overnight events. Patient reports feeling better today; weaned off supplemental O2. Weight down by 1 kg and patient diuresis 2.9 liters.    ROS: No chest pain, palpitations, SOB, light headedness, dizziness, headache, nausea/vomiting, fevers/chills, abdominal pain, dysuria or increased urinary frequency.    ICU Vital Signs Last 24 Hrs  T(C): 36.7 (07 Nov 2019 12:00), Max: 36.8 (07 Nov 2019 05:13)  T(F): 98.1 (07 Nov 2019 12:00), Max: 98.3 (07 Nov 2019 08:25)  HR: 90 (07 Nov 2019 08:25) (90 - 93)  BP: 107/60 (07 Nov 2019 12:00) (101/58 - 116/58)  RR: 20 (07 Nov 2019 12:00) (18 - 90)  SpO2: 92% (07 Nov 2019 12:00) (90% - 93%)    MEDICATIONS  (STANDING):  atorvastatin 20 milliGRAM(s) Oral at bedtime  cholecalciferol 1000 Unit(s) Oral daily  dextrose 5%. 1000 milliLiter(s) (50 mL/Hr) IV Continuous <Continuous>  dextrose 50% Injectable 12.5 Gram(s) IV Push once  dextrose 50% Injectable 25 Gram(s) IV Push once  dextrose 50% Injectable 25 Gram(s) IV Push once  furosemide    Tablet 80 milliGRAM(s) Oral two times a day  influenza   Vaccine 0.5 milliLiter(s) IntraMuscular once  insulin lispro (HumaLOG) corrective regimen sliding scale   SubCutaneous three times a day before meals  lactulose Syrup 15 Gram(s) Oral three times a day  lidocaine   Patch 2 Patch Transdermal daily  metolazone 2.5 milliGRAM(s) Oral daily  nystatin Ointment 1 Application(s) Topical two times a day  pantoprazole    Tablet 40 milliGRAM(s) Oral before breakfast  potassium chloride    Tablet ER 40 milliEquivalent(s) Oral daily  rifAXIMin 550 milliGRAM(s) Oral two times a day  saccharomyces boulardii 250 milliGRAM(s) Oral two times a day  senna 2 Tablet(s) Oral at bedtime  spironolactone 25 milliGRAM(s) Oral daily  sucralfate 1 Gram(s) Oral every 6 hours  tamsulosin 0.4 milliGRAM(s) Oral at bedtime  ursodiol Capsule 300 milliGRAM(s) Oral three times a day    MEDICATIONS  (PRN):  dextrose 40% Gel 15 Gram(s) Oral once PRN Blood Glucose LESS THAN 70 milliGRAM(s)/deciliter  glucagon  Injectable 1 milliGRAM(s) IntraMuscular once PRN Glucose LESS THAN 70 milligrams/deciliter  ondansetron Injectable 4 milliGRAM(s) IV Push every 6 hours PRN Nausea and/or Vomiting      LABS:                        8.7    4.66  )-----------( 61       ( 07 Nov 2019 08:16 )             26.1     11-07    132<L>  |  88<L>  |  42.0<H>  ----------------------------<  228<H>  4.1   |  32.0<H>  |  1.13    Ca    9.0      07 Nov 2019 08:03  Phos  2.9     11-07  Mg     1.6     11-07      CAPILLARY BLOOD GLUCOSE      POCT Blood Glucose.: 226 mg/dL (07 Nov 2019 16:37)  POCT Blood Glucose.: 217 mg/dL (07 Nov 2019 11:26)  POCT Blood Glucose.: 204 mg/dL (07 Nov 2019 08:14)  POCT Blood Glucose.: 255 mg/dL (06 Nov 2019 21:44)      PHYSICAL EXAM:    GENERAL: elderly male, chronically ill appearing, not in acute distress  HEAD:  Atraumatic, Normocephalic  EYES: EOMI, PERRLA, conjunctiva and sclera clear  ENMT: Moist mucous membranes  NECK: Supple   NERVOUS SYSTEM:  Alert & Oriented X3   CHEST/LUNG: bilateral air entry, coarse  HEART: Regular rate and rhythm; + S1/S2, +murmur  ABDOMEN: Soft, Nontender, distended; Bowel sounds present  EXTREMITIES:  ++ LE edema

## 2019-11-07 NOTE — PROGRESS NOTE ADULT - ASSESSMENT
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 He was found to be hypoglycemic ~29 and had a TMax ~102.6. He was fluid resuscitated, given Abx Zosyn/ Vanco and also one amp D50%, repeat FS ~ 69 for which he received another amp D50. Lactate was 4.1 ICU consulted for sepsis with fever, persistent hypoglycemia and hypotension. Patient takes Lantus 40U and Tradjenta 5mg at home which he was compliant with; but he has not been eating well. In the ED, he had a diagnostic paracentesis ~ 20cc bloody fluid was drained. Fluid cultures were negative. Found to have gram positive bacteremia and sepsis.  Downgraded to the hospitalist service for further management. CT abdomen and pelvis consistent with hepatomegaly and cirrhosis. ID was consulted, started on IV rocehin for Group B bacteremia for a total of 2 weeks with a midline.  GI was consulted; Hepatitis panel negative. Initially patient was anemic, planned for EGD. However Hb/hct stablized and patient had respiratory distress with elevated INR therefore plans for EGD were cancelled.  Started on IV diuresis for volume overload and diuresed well. Now transitioned to PO lasix with adequate response; if patient continues to diuresis well will plan for discharge to rehab in 24 hours.     1. Group B bacteremia: Source unclear  - Repeat blood cultures on 10/23 were negative  - 2 week course of Ceftriaxone completed  - LUE midline in place     2. Cirrhosis likely secondary to NAFLD  - continue PO lasix, along with metolazone and aldactone  - Ammonia noted; patient awake, alert. Continue lactulose and rifaxamin  - PPI PO BID  - Hepatology transplant referral as an out patient (please arrange prior to discharge), as per GI note    3. MISA with volume overload  - Renal function stable on diuretics; need to accept a degree of azotemia  - Monitor creatine, currently stable  - Monitor daily weights   - Strict I/Os   - Renal ultrasound reviewed  - Continue flomax  - 24hr urine protein to be collected  - Nephrology following, recommendations appreciated    4. Anemia: suspected secondary to GI bleed (s/p APC due to GAVE at [facility] last month)  - Hb remains stable s/p PRBCs during hospitalization (received total of 3 units)  - No further episodes of bleeding; EGD deferred per GI due to no evidence of acute GIB  - Continue PPI and carafate  - GI signed off    5. Acute urinary retention - resolved  - Beasley discontinued on 10/28  - Voiding well; Primafit in place   - Net output 2.9 liters in the past 24 hours  - Continue flomax     6. Thrombocytopenia likely secondary to cirrhosis   - Platelet counts remain stable  - seen by hem/onc last month; outpatient follow up    Dispo - Possible discharge to [facility] in 24 hours pending renal clearance.

## 2019-11-07 NOTE — PROGRESS NOTE ADULT - ASSESSMENT
MISA: improved cr 1.2 today  Concepcion+<30 c/w pre renal etiology (Hepatic cirrhosis)  (+)ATN from hypotension/sepsis==> resolved    Anasarca and pleural effusions  - cont Lasix w potassium supplements - changd to PO now and BID  - Restarted metolazone   - awaiting 24 hr urine protein- ordered yest but being done today  -No need for  RRT as responding to diuretics well  Keen for d/c to rehab -d/w pt  Will follow

## 2019-11-07 NOTE — PROGRESS NOTE ADULT - NSHPATTENDINGPLANDISCUSS_GEN_ALL_CORE
the patient.
patient, son Jeff, Dr. Clinton, Dr. Suresh, RN
patient, Dr. Clinton, RN, PA
patient, RN, Dr. Schmitz
patient, RN, Dr. Gamboa, PA
Dr. Hayden and the pt.
patient, Dr. Clinton, RN, PA
patient, PA, RN, Dr. Yanez
patient, RN, PA, Dr. Yanez
the patient.

## 2019-11-08 ENCOUNTER — TRANSCRIPTION ENCOUNTER (OUTPATIENT)
Age: 64
End: 2019-11-08

## 2019-11-08 VITALS
TEMPERATURE: 98 F | DIASTOLIC BLOOD PRESSURE: 74 MMHG | OXYGEN SATURATION: 93 % | RESPIRATION RATE: 20 BRPM | HEART RATE: 74 BPM | SYSTOLIC BLOOD PRESSURE: 107 MMHG

## 2019-11-08 LAB
ANION GAP SERPL CALC-SCNC: 9 MMOL/L — SIGNIFICANT CHANGE UP (ref 5–17)
BASOPHILS # BLD AUTO: 0.05 K/UL — SIGNIFICANT CHANGE UP (ref 0–0.2)
BASOPHILS NFR BLD AUTO: 1.1 % — SIGNIFICANT CHANGE UP (ref 0–2)
BUN SERPL-MCNC: 44 MG/DL — HIGH (ref 8–20)
CALCIUM SERPL-MCNC: 9 MG/DL — SIGNIFICANT CHANGE UP (ref 8.6–10.2)
CHLORIDE SERPL-SCNC: 89 MMOL/L — LOW (ref 98–107)
CO2 SERPL-SCNC: 33 MMOL/L — HIGH (ref 22–29)
CREAT SERPL-MCNC: 1.59 MG/DL — HIGH (ref 0.5–1.3)
EOSINOPHIL # BLD AUTO: 0.15 K/UL — SIGNIFICANT CHANGE UP (ref 0–0.5)
EOSINOPHIL NFR BLD AUTO: 3.2 % — SIGNIFICANT CHANGE UP (ref 0–6)
GLUCOSE BLDC GLUCOMTR-MCNC: 204 MG/DL — HIGH (ref 70–99)
GLUCOSE BLDC GLUCOMTR-MCNC: 221 MG/DL — HIGH (ref 70–99)
GLUCOSE SERPL-MCNC: 220 MG/DL — HIGH (ref 70–115)
HCT VFR BLD CALC: 24.5 % — LOW (ref 39–50)
HGB BLD-MCNC: 8.3 G/DL — LOW (ref 13–17)
IMM GRANULOCYTES NFR BLD AUTO: 0.2 % — SIGNIFICANT CHANGE UP (ref 0–1.5)
LYMPHOCYTES # BLD AUTO: 1.28 K/UL — SIGNIFICANT CHANGE UP (ref 1–3.3)
LYMPHOCYTES # BLD AUTO: 26.9 % — SIGNIFICANT CHANGE UP (ref 13–44)
MAGNESIUM SERPL-MCNC: 1.6 MG/DL — SIGNIFICANT CHANGE UP (ref 1.6–2.6)
MCHC RBC-ENTMCNC: 33.6 PG — SIGNIFICANT CHANGE UP (ref 27–34)
MCHC RBC-ENTMCNC: 33.9 GM/DL — SIGNIFICANT CHANGE UP (ref 32–36)
MCV RBC AUTO: 99.2 FL — SIGNIFICANT CHANGE UP (ref 80–100)
MONOCYTES # BLD AUTO: 0.91 K/UL — HIGH (ref 0–0.9)
MONOCYTES NFR BLD AUTO: 19.2 % — HIGH (ref 2–14)
NEUTROPHILS # BLD AUTO: 2.35 K/UL — SIGNIFICANT CHANGE UP (ref 1.8–7.4)
NEUTROPHILS NFR BLD AUTO: 49.4 % — SIGNIFICANT CHANGE UP (ref 43–77)
PHOSPHATE SERPL-MCNC: 2.9 MG/DL — SIGNIFICANT CHANGE UP (ref 2.4–4.7)
PLATELET # BLD AUTO: 60 K/UL — LOW (ref 150–400)
POTASSIUM SERPL-MCNC: 4 MMOL/L — SIGNIFICANT CHANGE UP (ref 3.5–5.3)
POTASSIUM SERPL-SCNC: 4 MMOL/L — SIGNIFICANT CHANGE UP (ref 3.5–5.3)
RBC # BLD: 2.47 M/UL — LOW (ref 4.2–5.8)
RBC # FLD: 20.2 % — HIGH (ref 10.3–14.5)
SODIUM SERPL-SCNC: 131 MMOL/L — LOW (ref 135–145)
WBC # BLD: 4.75 K/UL — SIGNIFICANT CHANGE UP (ref 3.8–10.5)
WBC # FLD AUTO: 4.75 K/UL — SIGNIFICANT CHANGE UP (ref 3.8–10.5)

## 2019-11-08 PROCEDURE — 99239 HOSP IP/OBS DSCHRG MGMT >30: CPT

## 2019-11-08 RX ORDER — POTASSIUM CHLORIDE 20 MEQ
2 PACKET (EA) ORAL
Qty: 0 | Refills: 0 | DISCHARGE
Start: 2019-11-08

## 2019-11-08 RX ORDER — SENNA PLUS 8.6 MG/1
2 TABLET ORAL
Qty: 0 | Refills: 0 | DISCHARGE
Start: 2019-11-08

## 2019-11-08 RX ORDER — PHYTONADIONE (VIT K1) 5 MG
1 TABLET ORAL
Qty: 0 | Refills: 0 | DISCHARGE

## 2019-11-08 RX ADMIN — NYSTATIN CREAM 1 APPLICATION(S): 100000 CREAM TOPICAL at 05:22

## 2019-11-08 RX ADMIN — Medication 2: at 08:22

## 2019-11-08 RX ADMIN — PANTOPRAZOLE SODIUM 40 MILLIGRAM(S): 20 TABLET, DELAYED RELEASE ORAL at 05:22

## 2019-11-08 RX ADMIN — Medication 1000 UNIT(S): at 12:17

## 2019-11-08 RX ADMIN — Medication 1 GRAM(S): at 05:22

## 2019-11-08 RX ADMIN — URSODIOL 300 MILLIGRAM(S): 250 TABLET, FILM COATED ORAL at 05:22

## 2019-11-08 RX ADMIN — SPIRONOLACTONE 25 MILLIGRAM(S): 25 TABLET, FILM COATED ORAL at 05:22

## 2019-11-08 RX ADMIN — Medication 2: at 12:17

## 2019-11-08 RX ADMIN — LIDOCAINE 2 PATCH: 4 CREAM TOPICAL at 00:57

## 2019-11-08 RX ADMIN — Medication 40 MILLIEQUIVALENT(S): at 12:17

## 2019-11-08 RX ADMIN — LACTULOSE 15 GRAM(S): 10 SOLUTION ORAL at 05:22

## 2019-11-08 RX ADMIN — Medication 1 GRAM(S): at 12:17

## 2019-11-08 RX ADMIN — Medication 80 MILLIGRAM(S): at 05:22

## 2019-11-08 NOTE — PROGRESS NOTE ADULT - REASON FOR ADMISSION
Persistent hypoglycemia

## 2019-11-08 NOTE — DISCHARGE NOTE PROVIDER - NSDCFUADDAPPT_GEN_ALL_CORE_FT
PLEASE ENSURE THAT [facility] IS CALLED TO FOLLOW UP IN [place].   Pt also aware of F/U with Dr Coto, his PMD/Cardiologist. Liver transplant/hepatology service information included.

## 2019-11-08 NOTE — DISCHARGE NOTE PROVIDER - HOSPITAL COURSE
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 He was found to be hypoglycemic ~29 and had a TMax ~102.6. He was fluid resuscitated, given Abx Zosyn/ Vanco and also one amp D50%, repeat FS ~ 69 for which he received another amp D50. Lactate was 4.1 ICU consulted for sepsis with fever, persistent hypoglycemia and hypotension. Patient takes Lantus 40U and Tradjenta 5mg at home which he was compliant with; but he has not been eating well. In the ED, he had a diagnostic paracentesis ~ 20cc bloody fluid was drained. Fluid cultures were negative. Found to have gram positive bacteremia and sepsis.  Downgraded to the hospitalist service for further management. CT abdomen and pelvis consistent with hepatomegaly and cirrhosis. ID was consulted, started on IV rocehin for Group B bacteremia for a total of 2 weeks with a midline.  GI was consulted; Hepatitis panel negative. Initially patient was anemic, planned for EGD. However Hb/hct stablized and patient had respiratory distress with elevated INR therefore plans for EGD were cancelled.  Started on IV diuresis for volume overload and diuresed well. Now transitioned to PO lasix with adequate response; stable for discharge to [facility] today.        1. Group B bacteremia: Source unclear    - Repeat blood cultures on 10/23 were negative    - 2 week course of Ceftriaxone completed        2. Cirrhosis likely secondary to NAFLD    - continue PO lasix, along with metolazone and aldactone    - Ammonia noted; patient awake, alert. Continue lactulose and rifaxamin    - PPI PO BID    - Hepatology transplant referral as an out patient (please arrange prior to discharge), as per GI note        3. MISA with volume overload    - Renal function stable on diuretics; need to accept a degree of azotemia    - Monitor creatine, currently stable    - Monitor daily weights, monitor intake and output    - Renal ultrasound reviewed    - Continue flomax    - Nephrology following, recommendations appreciated. Please call Dr. Cayden guardado for follow up in [facility]        4. Anemia: suspected secondary to GI bleed (s/p APC due to GAVE at [facility] last month)    - Hb remains stable s/p PRBCs during hospitalization (received total of 3 units)    - No further episodes of bleeding; EGD deferred per GI due to no evidence of acute GIB    - Continue PPI and carafate    - GI signed off        5. Acute urinary retention - resolved    - Beasley discontinued on 10/28    - Voiding well; Primafit in place     - Net output 3.4 liters in the past 24 hours    - Continue flomax         6. Thrombocytopenia likely secondary to cirrhosis     - Platelet counts remain stable    - seen by hem/onc last month; outpatient follow up        Vital Signs Last 24 Hrs    T(C): 36.8 (08 Nov 2019 07:42), Max: 37.1 (07 Nov 2019 20:16)    T(F): 98.3 (08 Nov 2019 07:42), Max: 98.8 (07 Nov 2019 20:16)    HR: 83 (08 Nov 2019 07:42) (82 - 88)    BP: 102/54 (08 Nov 2019 07:42) (100/61 - 132/61)    BP(mean): --    RR: 20 (08 Nov 2019 07:42) (18 - 20)    SpO2: 94% (07 Nov 2019 20:16) (92% - 94%)        PHYSICAL EXAM:    GENERAL: elderly male, chronically ill appearing, not in acute distress, comfortably sitting in bedside chair    HEAD:  Atraumatic, Normocephalic    EYES: EOMI, PERRLA, conjunctiva and sclera clear    ENMT: Moist mucous membranes    NECK: Supple     NERVOUS SYSTEM:  Alert & Oriented X3     CHEST/LUNG: bilateral air entry, coarse    HEART: Regular rate and rhythm; + S1/S2, +murmur    ABDOMEN: Soft, Nontender, distended; Bowel sounds present    EXTREMITIES:  + B/L LE edema, greatly improved, no cyanosis, clubbing or edema of B/L UE        Stable for discharge to [facility]. Discharge planning time 45minutes

## 2019-11-08 NOTE — DISCHARGE NOTE PROVIDER - NSDCCPCAREPLAN_GEN_ALL_CORE_FT
PRINCIPAL DISCHARGE DIAGNOSIS  Diagnosis: AMS (altered mental status)  Assessment and Plan of Treatment: resolved      SECONDARY DISCHARGE DIAGNOSES  Diagnosis: Ascites  Assessment and Plan of Treatment: continue duretics. Please call Dr. Myles's Renal group f    Diagnosis: Hypoglycemia  Assessment and Plan of Treatment: resolved PRINCIPAL DISCHARGE DIAGNOSIS  Diagnosis: AMS (altered mental status)  Assessment and Plan of Treatment: resolved      SECONDARY DISCHARGE DIAGNOSES  Diagnosis: Renal anasarca  Assessment and Plan of Treatment: please continue lasix, metolazone and aldactone  repeat BMP in 1-2 days  please consult Dr. Myles's nephrology group once patient arrives to [facility]    Diagnosis: Hepatic encephalopathy  Assessment and Plan of Treatment: Hepatic encephalopathy    Diagnosis: Ascites  Assessment and Plan of Treatment: continue duretics. Please call Dr. Myles's Renal group f    Diagnosis: Hypoglycemia  Assessment and Plan of Treatment: resolved  conitnue insulin sliding scale

## 2019-11-08 NOTE — PROGRESS NOTE ADULT - SUBJECTIVE AND OBJECTIVE BOX
Patient oob in chair.          Vital Signs Last 24 Hrs  T(C): 36.8 (08 Nov 2019 07:42), Max: 37.1 (07 Nov 2019 20:16)  T(F): 98.3 (08 Nov 2019 07:42), Max: 98.8 (07 Nov 2019 20:16)  HR: 83 (08 Nov 2019 07:42) (82 - 88)  BP: 102/54 (08 Nov 2019 07:42) (100/61 - 132/61)  BP(mean): --  RR: 20 (08 Nov 2019 07:42) (18 - 20)  SpO2: 94% (07 Nov 2019 20:16) (92% - 94%)  T(C): 36.7 (11-07-19 @ 12:00), Max: 36.8 (11-07-19 @ 05:13)  T(F): 98.1 (11-07-19 @ 12:00), Max: 98.3 (11-07-19 @ 08:25)  HR: 90 (11-07-19 @ 08:25) (90 - 93)  BP: 107/60 (11-07-19 @ 12:00) (101/58 - 121/56)  BP(mean): --  RR: 20 (11-07-19 @ 12:00) (18 - 90)  SpO2: 92% (11-07-19 @ 12:00) (90% - 93%)  PHYSICAL EXAM:    GENERAL: NAD,   EYES:  conjunctiva and sclera clear  NECK: Supple, No JVD/Bruit  NERVOUS SYSTEM:  wnl   CHEST:  Bilateral base crackles R>L  HEART:  RRR, no rub, 1/6   ABDOMEN: Soft, NT/ND BS+  EXTREMITIES:  Edema less  SKIN: No rashes    LABS:    11-08    131<L>  |  89<L>  |  44.0<H>  ----------------------------<  220<H>  4.0   |  33.0<H>  |  1.59<H>    Ca    9.0      08 Nov 2019 07:52  Phos  2.9     11-08  Mg     1.6     11-08                            8.7    4.66  )-----------( 61       ( 07 Nov 2019 08:16 )             26.1                           9.0    4.72  )-----------( 64       ( 06 Nov 2019 08:29 )             26.6     11-06    132<L>  |  88<L>  |  37.0<H>  ----------------------------<  194<H>  3.8   |  33.0<H>  |  1.20    Ca    9.4      06 Nov 2019 08:29  Phos  2.8     11-06  Mg     1.5     11-06    11-07    132<L>  |  88<L>  |  42.0<H>  ----------------------------<  228<H>  4.1   |  32.0<H>  |  1.13    Ca    9.0      07 Nov 2019 08:03  Phos  2.9     11-07  Mg     1.6     11-07        MEDICATIONS  (STANDING):  atorvastatin  cefTRIAXone   IVPB  cholecalciferol  dextrose 40% Gel PRN  dextrose 5%.  dextrose 50% Injectable  dextrose 50% Injectable  dextrose 50% Injectable  furosemide    Tablet  glucagon  Injectable PRN  influenza   Vaccine  insulin lispro (HumaLOG) corrective regimen sliding scale  lactulose Syrup  lidocaine   Patch  magnesium sulfate  IVPB  nystatin Ointment  ondansetron Injectable PRN  pantoprazole    Tablet  potassium chloride    Tablet ER  rifAXIMin  saccharomyces boulardii  senna  spironolactone  sucralfate  tamsulosin  ursodiol Capsule

## 2019-11-08 NOTE — PROGRESS NOTE ADULT - ATTENDING COMMENTS
Discharge on present diuretics - change to Inspra if able due to gynecomastia .
I have seen and examined the patient and agree with the above assessment and plan. Addendum made where necessary. Switch to PO lasix and monitor response. Possible discharge to [facility] in 24 hours.    Vitals stable  Physical exam:  General - awake, alert, oriented, laying in bed, NAD  HEENT - MMM  CVS - RRR, + S1/S2  Resp - bilateral air entry  GI - soft, obese, distended, + BS  Ext- ++ LE edema .
I have seen and examined the patient and agree with the above assessment and plan. Addendums made where necessary. Continue diuretics and monitor clinical response.    Vitals stable  Physical exam:  General - awake, alert, oriented, laying in bed, NAD  HEENT - MMM  CVS - RRR, + S1/S2  Resp - bilateral air entry  GI - soft, obese, distended, + BS  Ext- +++ LE edema
I have seen and examined the patient and agree with the above assessment and plan. No overnight events. Patient reports feeling better, AAO x 3 and answering questions appropriately. Weight's increasing, but likely inaccurate as patient is diuresing well. Continue diuretics per nephrology; no indication for ultrafiltration at this time. Monitor clinical status and if patient continues to improve; anticipate discharge to [facility] [place] in 48 hours.    Vitals stable  Physical exam   General - elderly male, chronically ill appearing, not in acute distress  HEENT - dry mucous membranes  CVS - RRR, + S1/S2  Resp - coarse   GI - soft, distended, nontender, + BS  Ext - +++ LE edema
I have seen and examined the patient and agree with the above assessment and plan. Patient complaining of fatigue, but otherwise denies chest pain or SOB. Hb 7.9 s/p 2 units of PRBCs. Will transfuse additional unit. EGD cancelled due to INR 2; vitamin K ordered. Tentative plans for EGD on Monday. Lactemia cleared with volume resuscitation. Continue IV albumin to complete 4 doses. Hold IVF. Check CT chest. Beasley placed due to urinary retention; TOV in 2-3 days if patient is more mobile. Check Ammonia level and ABG. Increase lactulose. Overall, prognosis guarded.     Vitals stable  Physical exam  General - middle aged male, morbidly obese, laying in bed in no acute distress  HEENT - MMM  CVS - RRR, + S1/S2, + murmur  Resp - bilateral air entry, coarse  GI - obese, nontender, + BS  Ext - ++ LE edema

## 2019-11-08 NOTE — DISCHARGE NOTE NURSING/CASE MANAGEMENT/SOCIAL WORK - PATIENT PORTAL LINK FT
You can access the FollowMyHealth Patient Portal offered by [facility] by registering at the following website: [email or web address]. By joining [employer]’s FollowMyHealth portal, you will also be able to view your health information using other applications (apps) compatible with our system.

## 2019-11-08 NOTE — DISCHARGE NOTE PROVIDER - NSDCMRMEDTOKEN_GEN_ALL_CORE_FT
furosemide 80 mg oral tablet: 1 tab(s) orally 2 times a day  insulin glargine: 40 unit(s) subcutaneous once a day  lactulose 10 g/15 mL oral syrup: 45 milliliter(s) orally every 6 hours   metOLazone 2.5 mg oral tablet: 1 tab(s) orally once a day  pantoprazole 40 mg oral delayed release tablet: 1 tab(s) orally 2 times a day   potassium chloride 20 mEq oral tablet, extended release: 2 tab(s) orally once a day  pravastatin 40 mg oral tablet: 1 tab(s) orally once a day  rifAXIMin 550 mg oral tablet: 1 tab(s) orally 2 times a day  senna oral tablet: 2 tab(s) orally once a day (at bedtime)  spironolactone 25 mg oral tablet: 1 tab(s) orally once a day  sucralfate 1 g oral tablet: 1 tab(s) orally every 6 hours  tamsulosin 0.4 mg oral capsule: 1 cap(s) orally once a day (at bedtime)  ursodiol 300 mg oral capsule: 1 cap(s) orally 3 times a day  Vitamin D3 2000 intl units oral tablet: 1 tab(s) orally once a day

## 2019-11-08 NOTE — DISCHARGE NOTE PROVIDER - PROVIDER TOKENS
PROVIDER:[TOKEN:[[id number]],FOLLOWUP:[1 week]],FREE:[LAST:[Nnamdi],FIRST:[Dillan],PHONE:[(   )    -],FAX:[(   )    -],ADDRESS:[[address] [place] [phone]],FOLLOWUP:[1 week],ESTABLISHEDPATIENT:[T]],FREE:[LAST:[[facility]],PHONE:[(   )    -],FAX:[(   )    -],ADDRESS:[[address] [place] [address] Dr Odell [address] [place] [address] [facility]  [phone]],FOLLOWUP:[Routine]]

## 2019-11-08 NOTE — PROGRESS NOTE ADULT - PROVIDER SPECIALTY LIST ADULT
Critical Care
Gastroenterology
Hospitalist
Infectious Disease
Internal Medicine
Nephrology
Gastroenterology
Infectious Disease
Gastroenterology
Nephrology
Gastroenterology
Hospitalist
Hospitalist
Gastroenterology

## 2019-11-08 NOTE — DISCHARGE NOTE PROVIDER - CARE PROVIDER_API CALL
Maximus Myles)  Internal Medicine; Nephrology  [address] [facility], [facility]  [place], [place] [id number]  Phone: [phone]  Fax: [phone]  Follow Up Time: 1 week    Dillan Coto  [address] [place] [address] [phone]  Phone: (   )    -  Fax: (   )    -  Established Patient  Follow Up Time: 1 week    [facility],   [address] [place] [address] [id number] [place] [address] [facility]  [phone]  Phone: (   )    -  Fax: (   )    -  Follow Up Time: Routine

## 2019-11-09 LAB
CULTURE RESULTS: SIGNIFICANT CHANGE UP
SPECIMEN SOURCE: SIGNIFICANT CHANGE UP

## 2019-11-12 PROCEDURE — 96366 THER/PROPH/DIAG IV INF ADDON: CPT

## 2019-11-12 PROCEDURE — 73501 X-RAY EXAM HIP UNI 1 VIEW: CPT

## 2019-11-12 PROCEDURE — 86703 HIV-1/HIV-2 1 RESULT ANTBDY: CPT

## 2019-11-12 PROCEDURE — 85730 THROMBOPLASTIN TIME PARTIAL: CPT

## 2019-11-12 PROCEDURE — 80053 COMPREHEN METABOLIC PANEL: CPT

## 2019-11-12 PROCEDURE — 87086 URINE CULTURE/COLONY COUNT: CPT

## 2019-11-12 PROCEDURE — 96375 TX/PRO/DX INJ NEW DRUG ADDON: CPT | Mod: XU

## 2019-11-12 PROCEDURE — 80202 ASSAY OF VANCOMYCIN: CPT

## 2019-11-12 PROCEDURE — 87040 BLOOD CULTURE FOR BACTERIA: CPT

## 2019-11-12 PROCEDURE — 87150 DNA/RNA AMPLIFIED PROBE: CPT

## 2019-11-12 PROCEDURE — 83935 ASSAY OF URINE OSMOLALITY: CPT

## 2019-11-12 PROCEDURE — 87070 CULTURE OTHR SPECIMN AEROBIC: CPT

## 2019-11-12 PROCEDURE — 83605 ASSAY OF LACTIC ACID: CPT

## 2019-11-12 PROCEDURE — 71250 CT THORAX DX C-: CPT

## 2019-11-12 PROCEDURE — 82728 ASSAY OF FERRITIN: CPT

## 2019-11-12 PROCEDURE — 86901 BLOOD TYPING SEROLOGIC RH(D): CPT

## 2019-11-12 PROCEDURE — 87633 RESP VIRUS 12-25 TARGETS: CPT

## 2019-11-12 PROCEDURE — 87075 CULTR BACTERIA EXCEPT BLOOD: CPT

## 2019-11-12 PROCEDURE — 85027 COMPLETE CBC AUTOMATED: CPT

## 2019-11-12 PROCEDURE — 82962 GLUCOSE BLOOD TEST: CPT

## 2019-11-12 PROCEDURE — 96376 TX/PRO/DX INJ SAME DRUG ADON: CPT | Mod: XU

## 2019-11-12 PROCEDURE — 97110 THERAPEUTIC EXERCISES: CPT

## 2019-11-12 PROCEDURE — 80048 BASIC METABOLIC PNL TOTAL CA: CPT

## 2019-11-12 PROCEDURE — 76775 US EXAM ABDO BACK WALL LIM: CPT

## 2019-11-12 PROCEDURE — 80074 ACUTE HEPATITIS PANEL: CPT

## 2019-11-12 PROCEDURE — 97116 GAIT TRAINING THERAPY: CPT

## 2019-11-12 PROCEDURE — 87581 M.PNEUMON DNA AMP PROBE: CPT

## 2019-11-12 PROCEDURE — 93306 TTE W/DOPPLER COMPLETE: CPT

## 2019-11-12 PROCEDURE — 36415 COLL VENOUS BLD VENIPUNCTURE: CPT

## 2019-11-12 PROCEDURE — 96365 THER/PROPH/DIAG IV INF INIT: CPT | Mod: XU

## 2019-11-12 PROCEDURE — P9047: CPT

## 2019-11-12 PROCEDURE — 83550 IRON BINDING TEST: CPT

## 2019-11-12 PROCEDURE — 83735 ASSAY OF MAGNESIUM: CPT

## 2019-11-12 PROCEDURE — 87798 DETECT AGENT NOS DNA AMP: CPT

## 2019-11-12 PROCEDURE — 89051 BODY FLUID CELL COUNT: CPT

## 2019-11-12 PROCEDURE — 93005 ELECTROCARDIOGRAM TRACING: CPT

## 2019-11-12 PROCEDURE — 82945 GLUCOSE OTHER FLUID: CPT

## 2019-11-12 PROCEDURE — 86923 COMPATIBILITY TEST ELECTRIC: CPT

## 2019-11-12 PROCEDURE — 83540 ASSAY OF IRON: CPT

## 2019-11-12 PROCEDURE — 74176 CT ABD & PELVIS W/O CONTRAST: CPT

## 2019-11-12 PROCEDURE — 84295 ASSAY OF SERUM SODIUM: CPT

## 2019-11-12 PROCEDURE — 82435 ASSAY OF BLOOD CHLORIDE: CPT

## 2019-11-12 PROCEDURE — 80076 HEPATIC FUNCTION PANEL: CPT

## 2019-11-12 PROCEDURE — 86850 RBC ANTIBODY SCREEN: CPT

## 2019-11-12 PROCEDURE — 84132 ASSAY OF SERUM POTASSIUM: CPT

## 2019-11-12 PROCEDURE — 85014 HEMATOCRIT: CPT

## 2019-11-12 PROCEDURE — 97530 THERAPEUTIC ACTIVITIES: CPT

## 2019-11-12 PROCEDURE — 83615 LACTATE (LD) (LDH) ENZYME: CPT

## 2019-11-12 PROCEDURE — 87205 SMEAR GRAM STAIN: CPT

## 2019-11-12 PROCEDURE — 82803 BLOOD GASES ANY COMBINATION: CPT

## 2019-11-12 PROCEDURE — 82330 ASSAY OF CALCIUM: CPT

## 2019-11-12 PROCEDURE — 82042 OTHER SOURCE ALBUMIN QUAN EA: CPT

## 2019-11-12 PROCEDURE — 96361 HYDRATE IV INFUSION ADD-ON: CPT

## 2019-11-12 PROCEDURE — 84145 PROCALCITONIN (PCT): CPT

## 2019-11-12 PROCEDURE — 84300 ASSAY OF URINE SODIUM: CPT

## 2019-11-12 PROCEDURE — 87486 CHLMYD PNEUM DNA AMP PROBE: CPT

## 2019-11-12 PROCEDURE — 86900 BLOOD TYPING SEROLOGIC ABO: CPT

## 2019-11-12 PROCEDURE — 85610 PROTHROMBIN TIME: CPT

## 2019-11-12 PROCEDURE — 82570 ASSAY OF URINE CREATININE: CPT

## 2019-11-12 PROCEDURE — 97163 PT EVAL HIGH COMPLEX 45 MIN: CPT

## 2019-11-12 PROCEDURE — 84540 ASSAY OF URINE/UREA-N: CPT

## 2019-11-12 PROCEDURE — 84100 ASSAY OF PHOSPHORUS: CPT

## 2019-11-12 PROCEDURE — 82140 ASSAY OF AMMONIA: CPT

## 2019-11-12 PROCEDURE — 87186 SC STD MICRODIL/AGAR DIL: CPT

## 2019-11-12 PROCEDURE — 84466 ASSAY OF TRANSFERRIN: CPT

## 2019-11-12 PROCEDURE — 99285 EMERGENCY DEPT VISIT HI MDM: CPT | Mod: 25

## 2019-11-12 PROCEDURE — 36430 TRANSFUSION BLD/BLD COMPNT: CPT

## 2019-11-12 PROCEDURE — 71045 X-RAY EXAM CHEST 1 VIEW: CPT

## 2019-11-12 PROCEDURE — 81001 URINALYSIS AUTO W/SCOPE: CPT

## 2019-11-12 PROCEDURE — P9016: CPT

## 2019-11-12 PROCEDURE — 82947 ASSAY GLUCOSE BLOOD QUANT: CPT

## 2019-11-12 NOTE — H&P ADULT - MOUTH
[facility]    Progress Note - PICU       Date of Admission:  11/7/2019  Date of Service: 11/10/19    Assessment & Plan   Lazaro Lund is a 21 year old male admitted on 11/07/19 for intractable nausea, abdominal pain, severe dehydration, and elevated lipase and amylase consistent with PEG asparaginase-associated pancreatitis. He was transferred to the PICU for concern for distributive shock and need for fluid resuscitation, pressors, and close monitoring.     Today's changes:  - lovenox will check level this afternoon (last night was not able to be drawn)  - schedule miralax daily  - PT consulted today  - volume status I/IO plan pending weight, want net negative but no overall goal  - BID lytes + phos + iCal  - KCl replacement protocol (KPhos inadequate per pharm today)  - one more day of TPN, concentrate as much as possible  - increase morphine PRN to 0.6  -Having port access issues, had to have vascular assess today    FEN  Dehydration  -Peptamen 1.5 @ 10 mL/hr to advance by 10 mL/hr every 4 hours; goal 65/hr  -TPN @ 55 mL/hr, will do one additional day 11/12-11/13  -Electrolyte replacements PRN for K+, Phos, calcium  -Replace calcium for iCal <4  -Replace K+ for <3.5  -Replace phos for phos <2  -Stict I/Os  -PTA vitamin D    RESP  -CPAP PEEP 6    CV  -Will continue to monitor BPs; goal >100/50  -Echo 11/10 showed normal function     GI  Asparaginase associated pancreatitis - Lipase >3x ULN, significant upper abdominal pain and intractable vomiting. Now concern for necrotizing pancreatitis.  - IV protonix 40mg daily   - NPO   - GI consulting appreciate recs  - Morphine PCA for pain management  - CT abdomen with IV contrast 11/10; small area necrosis    H/O constipation  -Miralax daily   -Senokot daily PRN    HEME/ONC  T cell lymphoblastic lymphoma   - Cycle delayed until at least 11/14 per heme notes   - Heme onc consulted   - CBC daily     DVT of the bilateral upper extremities    - Lovenox 80 mg Q12 per heme- will stay at same dose today   - Lovenox level this afternoon 11/12     PCP ppx   - Bactrim QM/T     Nausea  -Compazine q6h scheduled  -Zofran 8 mg PRN  -Ativan 2 mg PRN  -Benadryl PRN  -Scopolamine patch in place    At risk for chemotherapy induced cytopenias  - Maintain hgb > 7  - Maintain plt count > 30 d/t lovenox therapy    ID:  Fever  Concern for infectious pancreatitis  -Cefepime 2 g q8h (11/10-  -Metronidazole 500 mg q8h (11/10-     NEURO:  Abdominal pain 2/2 pancreatitis  -Morphine PCA  -Tylenol PRN   -PTA Melatonin    Hypotension  -Norepi stopped 11/11     DVT Prophylaxis: Enoxaparin (Lovenox)  Code Status: Full    Disposition Plan    > 7 days given fluid resuscitation, pain control, appropriate urine output  The patient's care was discussed with the acting attending Dr. Lee and attending Dr. Hume.    Lew Patterson MD  [facility] Pediatric[facility] PGY-2    Pediatric Critical Care Progress Note:    Lazaro Lund remains critically ill with acute hypoxic respiratory failure due to acute PEG-asparaginase related pancreatitis and fluid overload.    I personally examined and evaluated the patient today. All physician orders and treatments were placed at my direction.  Formulated plan with the house staff team or resident(s) and agree with the findings and plan in this note.  I have evaluated all laboratory values and imaging studies from the past 24 hours.  Consults ongoing and ordered are Gastroenterology and Oncology  I personally managed the respiratory and hemodynamic support, metabolic abnormalities, nutritional status, antimicrobial therapy, and pain/sedation management.   Key decisions made today included continuing to keep fluid balance negative, rechecking his weight, continuing NJ feeds, continuing CPAP, and continuing Lovenox for multiple DVTs.  Procedures that will happen in the ICU today are: non-invasive positive pressure ventilation  The above plans and care have been  discussed with mother and patient and all questions and concerns were addressed.  I spent a total of 35 minutes providing critical care services at the bedside, and on the critical care unit, evaluating the patient, directing care and reviewing laboratory values and radiologic reports for Lazaro Lund.    Janet Rae Hume, MD        Interval History    Lazaro tolerated CPAP overnight. CXR this morning showed overall mild improvement in pulmonary edema and pleural effusions. INR continues to slowly improve. Continues to be NPO. Basal level of morphine on PCA increased overnight. Requiring frequent corrections of calcium, phosphate, potassium, likely due to increased UOP yesterday.    Data reviewed today: I reviewed all medications, new labs and imaging results over the last 24 hours.     Physical Exam   Vital Signs: Temp: 98.4  F (36.9  C) Temp src: Axillary BP: 112/57 Pulse: 87 Heart Rate: 84 Resp: 28 SpO2: 100 % O2 Device: BiPAP/CPAP    Weight: 182 lbs 15.71 oz    EXAM:  GENERAL: Lying in bed, in significant pain, distressed  SKIN: Clear. No significant rash, abnormal pigmentation or lesions  HEAD: Normocephalic  EYES: EOMI, no injection  NOSE: CPAP in place.  LUNGS: Right side with diminished breath sounds throughout. Left side with better air movement but slightly more diminished at base/mid lung field than day prior. No increased work of breathing.   HEART: Regular rhythm. Normal S1/S2. No murmurs. Pulses are hyperdynamic.  ABDOMEN: Soft, flat, non-distended. Stable the last few days. Pain with palpation to epigastric region.  NEUROLOGIC: No focal findings.    Data   Recent Labs   Lab 11/12/19  0454 11/11/19  1657 11/11/19  1319 11/11/19  0411 11/10/19  2043 11/10/19  1708  11/10/19  0222  11/09/19  0630   WBC  --   --   --  7.6  --  8.6  --  10.9  --  9.5   HGB  --   --   --  8.1*  --  8.7*  --  9.8*  --  12.6*   MCV  --   --   --  82  --  82  --  84  --  81   PLT  --   --   --  223  --  296  --  254  --  383    INR 1.48*  --   --  1.57* 1.61*  --   --  1.82*   < >  --     138  --  138  --   --    < > 135   < > 138   POTASSIUM 2.9* 3.3* 3.0* 3.3*  --   --    < > 4.0   < > 4.2   CHLORIDE 106 100  --  105  --   --    < > 104   < > 106   CO2 32 34*  --  29  --   --    < > 23   < > 25   BUN 13 11  --  11  --   --    < > 22   < > 17   CR 0.50* 0.51*  --  0.52*  --   --    < > 0.88   < > 0.70   ANIONGAP 2* 4  --  4  --   --    < > 8   < > 7   MICHAEL 5.9* 6.2*  --  6.3*  --   --    < > 6.5*   < > 7.5*   * 101*  --  82  --   --    < > 104*   < > 114*   ALBUMIN 1.8*  --   --  2.1*  --   --    < > 2.3*   < > 2.8*   PROTTOTAL 3.3*  --   --  3.6*  --   --   --  3.8*  --   --    BILITOTAL 1.0  --   --  1.3  --   --   --  0.5  --   --    ALKPHOS 115  --   --  95  --   --   --  72  --   --    ALT 42  --   --  58  --   --   --  58  --   --    AST 27  --   --  51*  --   --   --  40  --   --    LIPASE  --   --   --  1,239*  --   --   --   --   --  4,112*    < > = values in this interval not displayed.     Recent Results (from the past 24 hour(s))   XR Abdomen Port 1 View    Narrative    EXAM: XR ABDOMEN PORT 1 VW  11/11/2019 3:22 PM     HISTORY:  NJ placement       COMPARISON:  CT 11/10/2019    FINDINGS:   Portable supine view of the abdomen. Feeding tube is looped with the  tip projecting over the expected location of the stomach. Gas-filled  loops of nondilated bowel.       Impression    IMPRESSION:   Feeding tube tip projects over the stomach. Wire remains within the  tube.    Findings discussed with Dr. Patterson at 1530 hours.     I have personally reviewed the examination and initial interpretation  and I agree with the findings.    FATOUMATA PINK MD   XR Abdomen Port 1 View    Narrative    Examination:  XR ABDOMEN PORT 1 VW 11/11/2019 9:49 PM     Comparison: X-ray 11/11/2019    History: NG palcement    Findings: Supine radiograph of the abdomen. Partially visualized  central venous catheter with the tip projecting over the  cavoatrial  junction. The feeding tube tip projects over the stomach with the wire  now removed.  Gas-filled loops of nondilated bowel. No pneumatosis or  portal venous gas is seen in the visualized abdomen. Asymmetric hazy  and confluent bibasilar opacities.      Impression    Impression:   1. Nonobstructive bowel gas pattern with feeding tube over the  stomach.  2. Asymmetric bibasilar atelectasis.    I have personally reviewed the examination and initial interpretation  and I agree with the findings.    SHENA ORTIZ MD     Medications     IV fluid REPLACEMENT ONLY       morphine       norepinephrine Stopped (11/11/19 1352)     parenteral nutrition - ADULT compounded formula 45 mL/hr at 11/11/19 1946     - MEDICATION INSTRUCTIONS -         acetaminophen  650 mg Oral Q6H     ceFEPIme (MAXIPIME) IV  2 g Intravenous Q8H     enoxaparin ANTICOAGULANT  80 mg Subcutaneous Q12H     lipids  120 mL Intravenous Q12H     melatonin  3 mg Oral At Bedtime     metroNIDAZOLE  500 mg Intravenous Q8H     pantoprazole (PROTONIX) IV  40 mg Intravenous Daily with breakfast     potassium phosphate  20 mmol Intravenous Once     prochlorperazine  10 mg Intravenous Q6H     scopolamine  1 patch Transdermal Q72H     scopolamine   Transdermal Q8H     scopolamine   Transdermal Q72H     sodium chloride (PF)  3 mL Intracatheter Q8H     sulfamethoxazole-trimethoprim  1 tablet Oral Q Mon Tues BID     Vitamin D3  2,000 Units Oral Daily      moist

## 2019-11-22 NOTE — H&P ADULT - GUM GEN PE MLT EXAM PC
Statement Selected OB Provider reported that the vagina was inspected and no foreign body was found/Laps, needles and instrument count was correct Normal genitalia; no lesions; no discharge

## 2019-11-26 ENCOUNTER — INPATIENT (INPATIENT)
Facility: HOSPITAL | Age: 64
LOS: 20 days | Discharge: HOME | DRG: 442 | End: 2019-12-17
Attending: INTERNAL MEDICINE | Admitting: INTERNAL MEDICINE
Payer: MEDICARE

## 2019-11-26 VITALS
DIASTOLIC BLOOD PRESSURE: 49 MMHG | HEART RATE: 64 BPM | WEIGHT: 257.94 LBS | RESPIRATION RATE: 18 BRPM | TEMPERATURE: 98 F | HEIGHT: 70 IN | OXYGEN SATURATION: 100 % | SYSTOLIC BLOOD PRESSURE: 88 MMHG

## 2019-11-26 DIAGNOSIS — E87.1 HYPO-OSMOLALITY AND HYPONATREMIA: ICD-10-CM

## 2019-11-26 DIAGNOSIS — N17.9 ACUTE KIDNEY FAILURE, UNSPECIFIED: ICD-10-CM

## 2019-11-26 DIAGNOSIS — E11.9 TYPE 2 DIABETES MELLITUS WITHOUT COMPLICATIONS: ICD-10-CM

## 2019-11-26 DIAGNOSIS — K74.60 UNSPECIFIED CIRRHOSIS OF LIVER: ICD-10-CM

## 2019-11-26 DIAGNOSIS — Z90.49 ACQUIRED ABSENCE OF OTHER SPECIFIED PARTS OF DIGESTIVE TRACT: Chronic | ICD-10-CM

## 2019-11-26 DIAGNOSIS — I10 ESSENTIAL (PRIMARY) HYPERTENSION: ICD-10-CM

## 2019-11-26 LAB
ALBUMIN SERPL ELPH-MCNC: 3.5 G/DL — SIGNIFICANT CHANGE UP (ref 3.3–5)
ALP SERPL-CCNC: 232 U/L — HIGH (ref 40–120)
ALT FLD-CCNC: 26 U/L — SIGNIFICANT CHANGE UP (ref 10–45)
AMMONIA BLD-MCNC: 66 UMOL/L — HIGH (ref 11–55)
ANION GAP SERPL CALC-SCNC: 16 MMOL/L — SIGNIFICANT CHANGE UP (ref 5–17)
ANION GAP SERPL CALC-SCNC: 18 MMOL/L — HIGH (ref 5–17)
ANISOCYTOSIS BLD QL: SLIGHT — SIGNIFICANT CHANGE UP
APPEARANCE UR: ABNORMAL
APTT BLD: 35.2 SEC — SIGNIFICANT CHANGE UP (ref 27.5–36.3)
AST SERPL-CCNC: 39 U/L — SIGNIFICANT CHANGE UP (ref 10–40)
BACTERIA # UR AUTO: NEGATIVE — SIGNIFICANT CHANGE UP
BASE EXCESS BLDV CALC-SCNC: 9.2 MMOL/L — HIGH (ref -2–2)
BASOPHILS # BLD AUTO: 0.05 K/UL — SIGNIFICANT CHANGE UP (ref 0–0.2)
BASOPHILS NFR BLD AUTO: 0.8 % — SIGNIFICANT CHANGE UP (ref 0–2)
BILIRUB SERPL-MCNC: 8.7 MG/DL — HIGH (ref 0.2–1.2)
BILIRUB UR-MCNC: NEGATIVE — SIGNIFICANT CHANGE UP
BUN SERPL-MCNC: 62 MG/DL — HIGH (ref 7–23)
BUN SERPL-MCNC: 64 MG/DL — HIGH (ref 7–23)
CALCIUM SERPL-MCNC: 10.1 MG/DL — SIGNIFICANT CHANGE UP (ref 8.4–10.5)
CALCIUM SERPL-MCNC: 10.1 MG/DL — SIGNIFICANT CHANGE UP (ref 8.4–10.5)
CHLORIDE SERPL-SCNC: 77 MMOL/L — LOW (ref 96–108)
CHLORIDE SERPL-SCNC: 79 MMOL/L — LOW (ref 96–108)
CO2 BLDV-SCNC: 36 MMOL/L — HIGH (ref 22–30)
CO2 SERPL-SCNC: 29 MMOL/L — SIGNIFICANT CHANGE UP (ref 22–31)
CO2 SERPL-SCNC: 30 MMOL/L — SIGNIFICANT CHANGE UP (ref 22–31)
COLOR SPEC: YELLOW — SIGNIFICANT CHANGE UP
CREAT ?TM UR-MCNC: 68 MG/DL — SIGNIFICANT CHANGE UP
CREAT SERPL-MCNC: 2.55 MG/DL — HIGH (ref 0.5–1.3)
CREAT SERPL-MCNC: 2.61 MG/DL — HIGH (ref 0.5–1.3)
DACRYOCYTES BLD QL SMEAR: SLIGHT — SIGNIFICANT CHANGE UP
DIFF PNL FLD: ABNORMAL
EOSINOPHIL # BLD AUTO: 0.06 K/UL — SIGNIFICANT CHANGE UP (ref 0–0.5)
EOSINOPHIL NFR BLD AUTO: 1 % — SIGNIFICANT CHANGE UP (ref 0–6)
EPI CELLS # UR: 0 /HPF — SIGNIFICANT CHANGE UP
GAS PNL BLDV: SIGNIFICANT CHANGE UP
GLUCOSE BLDC GLUCOMTR-MCNC: 155 MG/DL — HIGH (ref 70–99)
GLUCOSE BLDC GLUCOMTR-MCNC: 222 MG/DL — HIGH (ref 70–99)
GLUCOSE SERPL-MCNC: 186 MG/DL — HIGH (ref 70–99)
GLUCOSE SERPL-MCNC: 241 MG/DL — HIGH (ref 70–99)
GLUCOSE UR QL: NEGATIVE — SIGNIFICANT CHANGE UP
HCO3 BLDV-SCNC: 34 MMOL/L — HIGH (ref 21–29)
HCT VFR BLD CALC: 30.8 % — LOW (ref 39–50)
HGB BLD-MCNC: 11.1 G/DL — LOW (ref 13–17)
HYALINE CASTS # UR AUTO: 1 /LPF — SIGNIFICANT CHANGE UP (ref 0–2)
HYPOCHROMIA BLD QL: SIGNIFICANT CHANGE UP
IMM GRANULOCYTES NFR BLD AUTO: 1 % — SIGNIFICANT CHANGE UP (ref 0–1.5)
INR BLD: 1.83 RATIO — HIGH (ref 0.88–1.16)
KETONES UR-MCNC: NEGATIVE — SIGNIFICANT CHANGE UP
LEUKOCYTE ESTERASE UR-ACNC: NEGATIVE — SIGNIFICANT CHANGE UP
LIDOCAIN IGE QN: 70 U/L — HIGH (ref 7–60)
LYMPHOCYTES # BLD AUTO: 1.03 K/UL — SIGNIFICANT CHANGE UP (ref 1–3.3)
LYMPHOCYTES # BLD AUTO: 16.7 % — SIGNIFICANT CHANGE UP (ref 13–44)
MACROCYTES BLD QL: SLIGHT — SIGNIFICANT CHANGE UP
MANUAL SMEAR VERIFICATION: SIGNIFICANT CHANGE UP
MCHC RBC-ENTMCNC: 34.5 PG — HIGH (ref 27–34)
MCHC RBC-ENTMCNC: 36 GM/DL — SIGNIFICANT CHANGE UP (ref 32–36)
MCV RBC AUTO: 95.7 FL — SIGNIFICANT CHANGE UP (ref 80–100)
MONOCYTES # BLD AUTO: 0.49 K/UL — SIGNIFICANT CHANGE UP (ref 0–0.9)
MONOCYTES NFR BLD AUTO: 8 % — SIGNIFICANT CHANGE UP (ref 2–14)
NEUTROPHILS # BLD AUTO: 4.46 K/UL — SIGNIFICANT CHANGE UP (ref 1.8–7.4)
NEUTROPHILS NFR BLD AUTO: 72.5 % — SIGNIFICANT CHANGE UP (ref 43–77)
NITRITE UR-MCNC: NEGATIVE — SIGNIFICANT CHANGE UP
NRBC # BLD: 0 /100 WBCS — SIGNIFICANT CHANGE UP (ref 0–0)
OVALOCYTES BLD QL SMEAR: SLIGHT — SIGNIFICANT CHANGE UP
PCO2 BLDV: 49 MMHG — SIGNIFICANT CHANGE UP (ref 35–50)
PH BLDV: 7.46 — HIGH (ref 7.35–7.45)
PH UR: 6 — SIGNIFICANT CHANGE UP (ref 5–8)
PLAT MORPH BLD: NORMAL — SIGNIFICANT CHANGE UP
PLATELET # BLD AUTO: 62 K/UL — LOW (ref 150–400)
PO2 BLDV: 32 MMHG — SIGNIFICANT CHANGE UP (ref 25–45)
POIKILOCYTOSIS BLD QL AUTO: SLIGHT — SIGNIFICANT CHANGE UP
POTASSIUM SERPL-MCNC: 4.2 MMOL/L — SIGNIFICANT CHANGE UP (ref 3.5–5.3)
POTASSIUM SERPL-MCNC: 4.2 MMOL/L — SIGNIFICANT CHANGE UP (ref 3.5–5.3)
POTASSIUM SERPL-SCNC: 4.2 MMOL/L — SIGNIFICANT CHANGE UP (ref 3.5–5.3)
POTASSIUM SERPL-SCNC: 4.2 MMOL/L — SIGNIFICANT CHANGE UP (ref 3.5–5.3)
POTASSIUM UR-SCNC: 37 MMOL/L — SIGNIFICANT CHANGE UP
PROT ?TM UR-MCNC: 13 MG/DL — HIGH (ref 0–12)
PROT SERPL-MCNC: 7.6 G/DL — SIGNIFICANT CHANGE UP (ref 6–8.3)
PROT UR-MCNC: SIGNIFICANT CHANGE UP
PROT/CREAT UR-RTO: 0.2 RATIO — SIGNIFICANT CHANGE UP (ref 0–0.2)
PROTHROM AB SERPL-ACNC: 21.4 SEC — HIGH (ref 10–12.9)
RBC # BLD: 3.22 M/UL — LOW (ref 4.2–5.8)
RBC # FLD: 19.6 % — HIGH (ref 10.3–14.5)
RBC BLD AUTO: ABNORMAL
RBC CASTS # UR COMP ASSIST: 176 /HPF — HIGH (ref 0–4)
SAO2 % BLDV: 53 % — LOW (ref 67–88)
SODIUM SERPL-SCNC: 124 MMOL/L — LOW (ref 135–145)
SODIUM SERPL-SCNC: 125 MMOL/L — LOW (ref 135–145)
SODIUM UR-SCNC: 61 MMOL/L — SIGNIFICANT CHANGE UP
SP GR SPEC: 1.01 — SIGNIFICANT CHANGE UP (ref 1.01–1.02)
URATE SERPL-MCNC: 14.1 MG/DL — HIGH (ref 3.4–8.8)
UROBILINOGEN FLD QL: ABNORMAL
WBC # BLD: 6.15 K/UL — SIGNIFICANT CHANGE UP (ref 3.8–10.5)
WBC # FLD AUTO: 6.15 K/UL — SIGNIFICANT CHANGE UP (ref 3.8–10.5)
WBC UR QL: 5 /HPF — SIGNIFICANT CHANGE UP (ref 0–5)

## 2019-11-26 PROCEDURE — 99285 EMERGENCY DEPT VISIT HI MDM: CPT

## 2019-11-26 PROCEDURE — 73660 X-RAY EXAM OF TOE(S): CPT | Mod: 26,RT

## 2019-11-26 PROCEDURE — 71045 X-RAY EXAM CHEST 1 VIEW: CPT | Mod: 26

## 2019-11-26 PROCEDURE — 72125 CT NECK SPINE W/O DYE: CPT | Mod: 26

## 2019-11-26 PROCEDURE — 93010 ELECTROCARDIOGRAM REPORT: CPT

## 2019-11-26 PROCEDURE — 71250 CT THORAX DX C-: CPT | Mod: 26

## 2019-11-26 PROCEDURE — 70450 CT HEAD/BRAIN W/O DYE: CPT | Mod: 26

## 2019-11-26 PROCEDURE — 74176 CT ABD & PELVIS W/O CONTRAST: CPT | Mod: 26

## 2019-11-26 RX ORDER — FUROSEMIDE 40 MG
40 TABLET ORAL
Refills: 0 | Status: DISCONTINUED | OUTPATIENT
Start: 2019-11-26 | End: 2019-11-27

## 2019-11-26 RX ORDER — INSULIN GLARGINE 100 [IU]/ML
20 INJECTION, SOLUTION SUBCUTANEOUS AT BEDTIME
Refills: 0 | Status: DISCONTINUED | OUTPATIENT
Start: 2019-11-26 | End: 2019-12-11

## 2019-11-26 RX ORDER — LACTULOSE 10 G/15ML
10 SOLUTION ORAL EVERY 6 HOURS
Refills: 0 | Status: DISCONTINUED | OUTPATIENT
Start: 2019-11-26 | End: 2019-11-27

## 2019-11-26 RX ORDER — INSULIN LISPRO 100/ML
VIAL (ML) SUBCUTANEOUS AT BEDTIME
Refills: 0 | Status: DISCONTINUED | OUTPATIENT
Start: 2019-11-26 | End: 2019-12-17

## 2019-11-26 RX ORDER — SODIUM CHLORIDE 9 MG/ML
1 INJECTION INTRAMUSCULAR; INTRAVENOUS; SUBCUTANEOUS THREE TIMES A DAY
Refills: 0 | Status: COMPLETED | OUTPATIENT
Start: 2019-11-26 | End: 2019-11-27

## 2019-11-26 RX ORDER — CHOLECALCIFEROL (VITAMIN D3) 125 MCG
1 CAPSULE ORAL
Qty: 0 | Refills: 0 | DISCHARGE

## 2019-11-26 RX ORDER — INSULIN LISPRO 100/ML
VIAL (ML) SUBCUTANEOUS
Refills: 0 | Status: DISCONTINUED | OUTPATIENT
Start: 2019-11-26 | End: 2019-12-17

## 2019-11-26 RX ORDER — PANTOPRAZOLE SODIUM 20 MG/1
40 TABLET, DELAYED RELEASE ORAL
Refills: 0 | Status: DISCONTINUED | OUTPATIENT
Start: 2019-11-26 | End: 2019-12-17

## 2019-11-26 RX ORDER — TAMSULOSIN HYDROCHLORIDE 0.4 MG/1
0.4 CAPSULE ORAL AT BEDTIME
Refills: 0 | Status: DISCONTINUED | OUTPATIENT
Start: 2019-11-26 | End: 2019-12-17

## 2019-11-26 RX ORDER — DEXTROSE 50 % IN WATER 50 %
15 SYRINGE (ML) INTRAVENOUS ONCE
Refills: 0 | Status: DISCONTINUED | OUTPATIENT
Start: 2019-11-26 | End: 2019-12-17

## 2019-11-26 RX ORDER — ATORVASTATIN CALCIUM 80 MG/1
10 TABLET, FILM COATED ORAL AT BEDTIME
Refills: 0 | Status: DISCONTINUED | OUTPATIENT
Start: 2019-11-26 | End: 2019-12-17

## 2019-11-26 RX ORDER — SUCRALFATE 1 G
1 TABLET ORAL
Refills: 0 | Status: DISCONTINUED | OUTPATIENT
Start: 2019-11-26 | End: 2019-12-17

## 2019-11-26 RX ORDER — DEXTROSE 50 % IN WATER 50 %
12.5 SYRINGE (ML) INTRAVENOUS ONCE
Refills: 0 | Status: DISCONTINUED | OUTPATIENT
Start: 2019-11-26 | End: 2019-12-17

## 2019-11-26 RX ORDER — URSODIOL 250 MG/1
300 TABLET, FILM COATED ORAL
Refills: 0 | Status: DISCONTINUED | OUTPATIENT
Start: 2019-11-26 | End: 2019-12-02

## 2019-11-26 RX ORDER — PHYTONADIONE (VIT K1) 5 MG
5 TABLET ORAL DAILY
Refills: 0 | Status: COMPLETED | OUTPATIENT
Start: 2019-11-26 | End: 2019-11-29

## 2019-11-26 RX ORDER — GLUCAGON INJECTION, SOLUTION 0.5 MG/.1ML
1 INJECTION, SOLUTION SUBCUTANEOUS ONCE
Refills: 0 | Status: DISCONTINUED | OUTPATIENT
Start: 2019-11-26 | End: 2019-12-17

## 2019-11-26 RX ORDER — SODIUM CHLORIDE 9 MG/ML
1000 INJECTION, SOLUTION INTRAVENOUS
Refills: 0 | Status: DISCONTINUED | OUTPATIENT
Start: 2019-11-26 | End: 2019-12-17

## 2019-11-26 RX ORDER — DEXTROSE 50 % IN WATER 50 %
25 SYRINGE (ML) INTRAVENOUS ONCE
Refills: 0 | Status: DISCONTINUED | OUTPATIENT
Start: 2019-11-26 | End: 2019-12-17

## 2019-11-26 RX ADMIN — LACTULOSE 10 GRAM(S): 10 SOLUTION ORAL at 21:59

## 2019-11-26 RX ADMIN — ATORVASTATIN CALCIUM 10 MILLIGRAM(S): 80 TABLET, FILM COATED ORAL at 22:00

## 2019-11-26 RX ADMIN — TAMSULOSIN HYDROCHLORIDE 0.4 MILLIGRAM(S): 0.4 CAPSULE ORAL at 22:00

## 2019-11-26 RX ADMIN — INSULIN GLARGINE 20 UNIT(S): 100 INJECTION, SOLUTION SUBCUTANEOUS at 21:59

## 2019-11-26 RX ADMIN — SODIUM CHLORIDE 1 GRAM(S): 9 INJECTION INTRAMUSCULAR; INTRAVENOUS; SUBCUTANEOUS at 21:59

## 2019-11-26 NOTE — ED ADULT NURSE NOTE - NSIMPLEMENTINTERV_GEN_ALL_ED
Implemented All Fall Risk Interventions:  [place] to call system. Call bell, personal items and telephone within reach. Instruct patient to call for assistance. Room bathroom lighting operational. Non-slip footwear when patient is off stretcher. Physically safe environment: no spills, clutter or unnecessary equipment. Stretcher in lowest position, wheels locked, appropriate side rails in place. Provide visual cue, wrist band, yellow gown, etc. Monitor gait and stability. Monitor for mental status changes and reorient to person, place, and time. Review medications for side effects contributing to fall risk. Reinforce activity limits and safety measures with patient and family.

## 2019-11-26 NOTE — H&P ADULT - ASSESSMENT
pt with above history p/w difficulty ambulation / weakness       - flap tremor + on Exam , likely hepatic encephalopathy - check ammonis level , lactulose po  - titrate to 2-3 bms    - hyperbilirubinemia- ultrasound liver to r/o obstruction , GI f/u , cont rifaximin     - ARF on ckd stage 3 - likely hepatorenal , avoid nephrotoxic agents bladder scan to r/o retention , renal f/u - pt on aldactone+ Lasix+ zaroxolyn - pt doesn't appear fluid overloaded at present, [id number] volume status closely , will hold aldactone + zaroxolyn,  dec lasix to 40 bid      - dm2 - iss     - Thrombocytopenia - likely sec to liver pathology     - coagulopathy - likely sec to liver pathology , monitor closely for active signs of bleeding , vit k     - HLD - pt om parvastaTIN - f/u lipid profile    ppi pt with above history p/w difficulty ambulation / weakness       - flap tremor + on Exam , likely hepatic encephalopathy - check ammonis level , lactulose po  - titrate to 2-3 bms- head ct / fall precautions    - hyperbilirubinemia- ultrasound liver to r/o obstruction , GI f/u , cont rifaximin     - ARF on ckd stage 3 - likely hepatorenal , avoid nephrotoxic agents bladder scan to r/o retention , renal f/u - pt on aldactone+ Lasix+ zaroxolyn - pt doesn't appear fluid overloaded at present, [id number] volume status closely , will hold aldactone + zaroxolyn,  dec lasix to 40 bid      - dm2 - iss     - Thrombocytopenia - likely sec to liver pathology     - coagulopathy - likely sec to liver pathology , monitor closely for active signs of bleeding , vit k     - HLD - pt om parvastaTIN - f/u lipid profile    ppi

## 2019-11-26 NOTE — ED PROVIDER NOTE - OBJECTIVE STATEMENT
63M w/ pmh HTN, HLD, DM, non-alcoholic cirrhosis, chronic thrombocytopenia -- sent from rehab for worsening jaundice, dizzy/weakness/falls x 4 days. Discharged to rehab after last hospitalization. No fever, n/v/d/c, chest / abd pain, cough, sob, dysuria/hematuria.     Admitted 10/22-11/8 for sepsis, hypoglycemia, cirrhosis (lactulose, xifaxan), hepatic encephalopathy, GI bleed; G+ bacteremia, EGD

## 2019-11-26 NOTE — ED PROVIDER NOTE - PHYSICAL EXAMINATION
*GEN:   jaundiced, mildly uncomfortable, AOx3  *EYES:   pupils equally round and reactive to light, extra-occular movements intact  *HEENT:   airway patent, moist mucosal membranes, full ROM neck  *CV:   regular rate and rhythm  *RESP:   clear to auscultation bilaterally, non-labored  *ABD:   soft, non-tender throughout  *:   no cva/flank tenderness  *EXTREM:   no MSK tenderness, full ROM throughout, no leg swelling  *SKIN:   dry, intact  *NEURO:   AOx3, no focal weakness or loss of sensation

## 2019-11-26 NOTE — ED PROVIDER NOTE - CLINICAL SUMMARY MEDICAL DECISION MAKING FREE TEXT BOX
63M w/ worsening jaundice, lightheadedness, unsteady gait - concern for worsening cirrhosis, will check labs, CTAP, reassess 63M w/ worsening jaundice, lightheadedness, unsteady gait - concern for worsening cirrhosis, will check labs, CTAP, reassess  Rosas: 63 year old male with dizziness, lightheadedness, unsteady gait. x 1 week and also worsening jaundice. decrease PO and has been fatigue and weak past few days. recent admission to [place] prior to rehab for sepsis. also with multiple falls. will get labs, ct head, ct chest, ct a/p, ivf, admit to hospital.

## 2019-11-26 NOTE — H&P ADULT - HISTORY OF PRESENT ILLNESS
63M w/ pmh HTN, HLD, DM, non-alcoholic cirrhosis, chronic thrombocytopenia -- sent from rehab for worsening jaundice, dizzy/weakness/falls x 4 days. Discharged to rehab after last hospitalization. No fever, n/v/d/c, chest / abd pain, cough, sob, dysuria/hematuria. recently admitted at [facility] for sepsis, hypoglycemia, cirrhosis (lactulose, xifaxan), hepatic encephalopathy, GI bleed; G+ bacteremia, EGD, last bm yesterday morning

## 2019-11-26 NOTE — ED ADULT NURSE NOTE - OBJECTIVE STATEMENT
63M pt AxOx3 ambulatory to ED c/o unsteady gait and increased generalized weakness and worsening jaundice x4days w/ multiple falls. PMHx cirrhosis (nonalcoholic), HTN, HLD, DMT2. Pt denies CP/SOB/N/V/D/fever/chills. Pt tolerating PO intake well. On assessment, +jaundice noted to skin and sclera of eyes. Skin intact. PERRLA. No change in mental status. Lungs clear, resp even, unlabored. +Ecchymosis noted to L chest wall, skin intact. No crepitus, obvious deformities noted. TORREZ. Gross Neuro intact. Abd soft, distended, NT to palp. Sensory intact. No dysphagia noted. Unsteady gait noted. #18G RAC, labs drawn and sent. EKG @ bedside. MDs at bedside for eval. Family at bedside. Will continue to monitor. EKG @ bedside

## 2019-11-26 NOTE — PATIENT PROFILE ADULT - REASON FOR REFUSAL (REFER PATIENT TO HEALTHCARE PROVIDER FOR FOLLOW-UP):
pt unsure if vaccinated on last discharge October 2019 Pt spouse will look on last discharge if vaccinated

## 2019-11-26 NOTE — CONSULT NOTE ADULT - ASSESSMENT
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

## 2019-11-26 NOTE — CONSULT NOTE ADULT - SUBJECTIVE AND OBJECTIVE BOX
Patient is a 63y Male whom presented to the hospital with ckd and dena     PAST MEDICAL & SURGICAL HISTORY:  GIB (gastrointestinal bleeding)  GERD with esophagitis: Gastritis &amp; Non Bleeding Ulcers  Hepatic encephalopathy  Obesity  Fatty liver disease, nonalcoholic  Renal stones: 25 years ago  Hypertension  Neuropathy  Hypercholesteremia  Diabetes  S/P cholecystectomy      MEDICATIONS  (STANDING):  dextrose 5%. 1000 milliLiter(s) (50 mL/Hr) IV Continuous <Continuous>  dextrose 50% Injectable 12.5 Gram(s) IV Push once  dextrose 50% Injectable 25 Gram(s) IV Push once  dextrose 50% Injectable 25 Gram(s) IV Push once  insulin lispro (HumaLOG) corrective regimen sliding scale   SubCutaneous three times a day before meals  insulin lispro (HumaLOG) corrective regimen sliding scale   SubCutaneous at bedtime      Allergies    codeine (Anaphylaxis)    Intolerances    NO  RED MEAT (Unknown)      SOCIAL HISTORY:  Denies ETOh,Smoking,     FAMILY HISTORY:  Family history of type 2 diabetes mellitus  Family history of hypertension  Family history of stomach cancer      REVIEW OF SYSTEMS:    CONSTITUTIONAL: No weakness, fevers or chills  EYES/ENT: No visual changes;  no throat pain   NECK: No pain or stiffness  RESPIRATORY: No cough, wheezing, hemoptysis; No shortness of breath  CARDIOVASCULAR: No chest pain or palpitations  GASTROINTESTINAL: No abdominal or epigastric pain. No nausea, vomiting,     No diarrhea or constipation. No melena   GENITOURINARY: No dysuria, frequency or hematuria  NEUROLOGICAL: No numbness or weakness  SKIN: dry      VITAL:  T(C): , Max: 36.7 (19 @ 15:30)  T(F): , Max: 98.1 (19 @ 15:30)  HR: 79 (19 @ 18:20)  BP: 96/58 (19 @ 18:20)  BP(mean): --  RR: 18 (19 @ 18:20)  SpO2: 100% (19 @ 18:20)  Wt(kg): --    I and O's:    Height (cm): 185.4 ( @ 18:20)  Weight (kg): 115.5 ( @ 18:20)  BMI (kg/m2): 33.6 ( @ 18:20)  BSA (m2): 2.38 ( @ 18:20)    PHYSICAL EXAM:    Constitutional: NAD  HEENT: conjunctive   clear   Neck:  No JVD  Respiratory: CTAB  Cardiovascular: S1 and S2  Gastrointestinal: BS+, soft, NT/ND  Extremities: No peripheral edema  Neurological: A/O x 3, no focal deficits  Psychiatric: Normal mood, normal affect  : No Beasley  Skin: No rashes  Access: Not applicable    LABS:                        11.1   6.15  )-----------( 62       ( 2019 10:37 )             30.8         125<L>  |  79<L>  |  62<H>  ----------------------------<  186<H>  4.2   |  30  |  2.55<H>    Ca    10.1      2019 10:37    TPro  7.6  /  Alb  3.5  /  TBili  8.7<H>  /  DBili  x   /  AST  39  /  ALT  26  /  AlkPhos  232<H>        Urine Studies:  Urinalysis Basic - ( 2019 11:47 )    Color: Yellow / Appearance: Slightly Turbid / S.013 / pH: x  Gluc: x / Ketone: Negative  / Bili: Negative / Urobili: 2 mg/dL   Blood: x / Protein: Trace / Nitrite: Negative   Leuk Esterase: Negative / RBC: 176 /hpf / WBC 5 /HPF   Sq Epi: x / Non Sq Epi: 0 /hpf / Bacteria: Negative            RADIOLOGY & ADDITIONAL STUDIES:

## 2019-11-26 NOTE — H&P ADULT - NS NEC GEN PE MLT EXAM PC
Normal vision: sees adequately in most situations; can see medication labels, newsprint No bruits; no thyromegaly or nodules

## 2019-11-27 ENCOUNTER — TRANSCRIPTION ENCOUNTER (OUTPATIENT)
Age: 64
End: 2019-11-27

## 2019-11-27 DIAGNOSIS — K72.90 HEPATIC FAILURE, UNSPECIFIED WITHOUT COMA: ICD-10-CM

## 2019-11-27 DIAGNOSIS — Z71.89 OTHER SPECIFIED COUNSELING: ICD-10-CM

## 2019-11-27 LAB
ALBUMIN SERPL ELPH-MCNC: 3.2 G/DL — LOW (ref 3.3–5)
ALP SERPL-CCNC: 191 U/L — HIGH (ref 40–120)
ALT FLD-CCNC: 24 U/L — SIGNIFICANT CHANGE UP (ref 10–45)
AMMONIA BLD-MCNC: 132 UMOL/L — HIGH (ref 11–55)
ANION GAP SERPL CALC-SCNC: 15 MMOL/L — SIGNIFICANT CHANGE UP (ref 5–17)
ANION GAP SERPL CALC-SCNC: 16 MMOL/L — SIGNIFICANT CHANGE UP (ref 5–17)
ANION GAP SERPL CALC-SCNC: 16 MMOL/L — SIGNIFICANT CHANGE UP (ref 5–17)
APPEARANCE UR: ABNORMAL
AST SERPL-CCNC: 34 U/L — SIGNIFICANT CHANGE UP (ref 10–40)
BACTERIA # UR AUTO: NEGATIVE — SIGNIFICANT CHANGE UP
BASOPHILS # BLD AUTO: 0.04 K/UL — SIGNIFICANT CHANGE UP (ref 0–0.2)
BASOPHILS NFR BLD AUTO: 0.6 % — SIGNIFICANT CHANGE UP (ref 0–2)
BILIRUB DIRECT SERPL-MCNC: 3.5 MG/DL — HIGH (ref 0–0.2)
BILIRUB INDIRECT FLD-MCNC: 4.9 MG/DL — HIGH (ref 0.2–1)
BILIRUB SERPL-MCNC: 8.4 MG/DL — HIGH (ref 0.2–1.2)
BILIRUB UR-MCNC: NEGATIVE — SIGNIFICANT CHANGE UP
BUN SERPL-MCNC: 70 MG/DL — HIGH (ref 7–23)
C3 SERPL-MCNC: 36 MG/DL — LOW (ref 81–157)
C4 SERPL-MCNC: 7 MG/DL — LOW (ref 13–39)
CALCIUM SERPL-MCNC: 10 MG/DL — SIGNIFICANT CHANGE UP (ref 8.4–10.5)
CALCIUM SERPL-MCNC: 10.1 MG/DL — SIGNIFICANT CHANGE UP (ref 8.4–10.5)
CALCIUM SERPL-MCNC: 9.9 MG/DL — SIGNIFICANT CHANGE UP (ref 8.4–10.5)
CEA SERPL-MCNC: 5.5 NG/ML — HIGH (ref 0–3.8)
CHLORIDE SERPL-SCNC: 79 MMOL/L — LOW (ref 96–108)
CHLORIDE SERPL-SCNC: 81 MMOL/L — LOW (ref 96–108)
CHLORIDE SERPL-SCNC: 81 MMOL/L — LOW (ref 96–108)
CHOLEST SERPL-MCNC: 71 MG/DL — SIGNIFICANT CHANGE UP (ref 10–199)
CO2 SERPL-SCNC: 30 MMOL/L — SIGNIFICANT CHANGE UP (ref 22–31)
CO2 SERPL-SCNC: 30 MMOL/L — SIGNIFICANT CHANGE UP (ref 22–31)
CO2 SERPL-SCNC: 31 MMOL/L — SIGNIFICANT CHANGE UP (ref 22–31)
COLOR SPEC: YELLOW — SIGNIFICANT CHANGE UP
CREAT ?TM UR-MCNC: 67 MG/DL — SIGNIFICANT CHANGE UP
CREAT SERPL-MCNC: 2.63 MG/DL — HIGH (ref 0.5–1.3)
CREAT SERPL-MCNC: 2.67 MG/DL — HIGH (ref 0.5–1.3)
CREAT SERPL-MCNC: 2.68 MG/DL — HIGH (ref 0.5–1.3)
CULTURE RESULTS: SIGNIFICANT CHANGE UP
DIFF PNL FLD: ABNORMAL
EOSINOPHIL # BLD AUTO: 0.11 K/UL — SIGNIFICANT CHANGE UP (ref 0–0.5)
EOSINOPHIL NFR BLD AUTO: 1.6 % — SIGNIFICANT CHANGE UP (ref 0–6)
EOSINOPHIL NFR URNS MANUAL: NEGATIVE — SIGNIFICANT CHANGE UP
EPI CELLS # UR: 0 /HPF — SIGNIFICANT CHANGE UP (ref 0–5)
GLUCOSE BLDC GLUCOMTR-MCNC: 165 MG/DL — HIGH (ref 70–99)
GLUCOSE BLDC GLUCOMTR-MCNC: 172 MG/DL — HIGH (ref 70–99)
GLUCOSE BLDC GLUCOMTR-MCNC: 181 MG/DL — HIGH (ref 70–99)
GLUCOSE BLDC GLUCOMTR-MCNC: 189 MG/DL — HIGH (ref 70–99)
GLUCOSE SERPL-MCNC: 183 MG/DL — HIGH (ref 70–99)
GLUCOSE SERPL-MCNC: 195 MG/DL — HIGH (ref 70–99)
GLUCOSE SERPL-MCNC: 205 MG/DL — HIGH (ref 70–99)
GLUCOSE UR QL: NEGATIVE — SIGNIFICANT CHANGE UP
HAV IGM SER-ACNC: SIGNIFICANT CHANGE UP
HBV CORE IGM SER-ACNC: SIGNIFICANT CHANGE UP
HBV SURFACE AG SER-ACNC: SIGNIFICANT CHANGE UP
HCT VFR BLD CALC: 27.4 % — LOW (ref 39–50)
HCT VFR BLD CALC: 28.3 % — LOW (ref 39–50)
HCV AB S/CO SERPL IA: 0.16 S/CO — SIGNIFICANT CHANGE UP (ref 0–0.99)
HCV AB SERPL-IMP: SIGNIFICANT CHANGE UP
HDLC SERPL-MCNC: 30 MG/DL — LOW
HGB BLD-MCNC: 10.1 G/DL — LOW (ref 13–17)
HGB BLD-MCNC: 9.9 G/DL — LOW (ref 13–17)
HYALINE CASTS # UR AUTO: 3 /LPF — SIGNIFICANT CHANGE UP (ref 0–7)
IMM GRANULOCYTES NFR BLD AUTO: 0.6 % — SIGNIFICANT CHANGE UP (ref 0–1.5)
INR BLD: 1.97 RATIO — HIGH (ref 0.88–1.16)
KETONES UR-MCNC: NEGATIVE — SIGNIFICANT CHANGE UP
LEUKOCYTE ESTERASE UR-ACNC: NEGATIVE — SIGNIFICANT CHANGE UP
LIPID PNL WITH DIRECT LDL SERPL: 28 MG/DL — SIGNIFICANT CHANGE UP
LYMPHOCYTES # BLD AUTO: 1.34 K/UL — SIGNIFICANT CHANGE UP (ref 1–3.3)
LYMPHOCYTES # BLD AUTO: 19.8 % — SIGNIFICANT CHANGE UP (ref 13–44)
MAGNESIUM UR-MCNC: 3.4 MG/DL — SIGNIFICANT CHANGE UP
MANUAL SMEAR VERIFICATION: SIGNIFICANT CHANGE UP
MCHC RBC-ENTMCNC: 33.6 PG — SIGNIFICANT CHANGE UP (ref 27–34)
MCHC RBC-ENTMCNC: 34.7 PG — HIGH (ref 27–34)
MCHC RBC-ENTMCNC: 35 GM/DL — SIGNIFICANT CHANGE UP (ref 32–36)
MCHC RBC-ENTMCNC: 36.9 GM/DL — HIGH (ref 32–36)
MCV RBC AUTO: 94.2 FL — SIGNIFICANT CHANGE UP (ref 80–100)
MCV RBC AUTO: 95.9 FL — SIGNIFICANT CHANGE UP (ref 80–100)
MICROALBUMIN UR-MCNC: 3.3 MG/DL — SIGNIFICANT CHANGE UP
MICROALBUMIN/CREAT UR-RTO: 49 MG/G — HIGH (ref 0–30)
MONOCYTES # BLD AUTO: 0.68 K/UL — SIGNIFICANT CHANGE UP (ref 0–0.9)
MONOCYTES NFR BLD AUTO: 10 % — SIGNIFICANT CHANGE UP (ref 2–14)
NEUTROPHILS # BLD AUTO: 4.57 K/UL — SIGNIFICANT CHANGE UP (ref 1.8–7.4)
NEUTROPHILS NFR BLD AUTO: 67.4 % — SIGNIFICANT CHANGE UP (ref 43–77)
NITRITE UR-MCNC: NEGATIVE — SIGNIFICANT CHANGE UP
NRBC # BLD: 0 /100 WBCS — SIGNIFICANT CHANGE UP (ref 0–0)
OSMOLALITY SERPL: 290 MOSMOL/KG — SIGNIFICANT CHANGE UP (ref 280–301)
OSMOLALITY UR: 335 MOSM/KG — SIGNIFICANT CHANGE UP (ref 50–1200)
PH UR: 6 — SIGNIFICANT CHANGE UP (ref 5–8)
PHOSPHATE 24H UR-MCNC: 32.1 MG/DL — SIGNIFICANT CHANGE UP
PLAT MORPH BLD: SIGNIFICANT CHANGE UP
PLATELET # BLD AUTO: 71 K/UL — LOW (ref 150–400)
PLATELET # BLD AUTO: 74 K/UL — LOW (ref 150–400)
POTASSIUM SERPL-MCNC: 3.9 MMOL/L — SIGNIFICANT CHANGE UP (ref 3.5–5.3)
POTASSIUM SERPL-SCNC: 3.9 MMOL/L — SIGNIFICANT CHANGE UP (ref 3.5–5.3)
PROT ?TM UR-MCNC: 13 MG/DL — HIGH (ref 0–12)
PROT SERPL-MCNC: 6.7 G/DL — SIGNIFICANT CHANGE UP (ref 6–8.3)
PROT UR-MCNC: NEGATIVE — SIGNIFICANT CHANGE UP
PROTHROM AB SERPL-ACNC: 22.9 SEC — HIGH (ref 10–13.1)
RBC # BLD: 2.91 M/UL — LOW (ref 4.2–5.8)
RBC # BLD: 2.95 M/UL — LOW (ref 4.2–5.8)
RBC # FLD: 19 % — HIGH (ref 10.3–14.5)
RBC # FLD: 19.2 % — HIGH (ref 10.3–14.5)
RBC BLD AUTO: SIGNIFICANT CHANGE UP
RBC CASTS # UR COMP ASSIST: 14 /HPF — HIGH (ref 0–4)
SODIUM SERPL-SCNC: 125 MMOL/L — LOW (ref 135–145)
SODIUM SERPL-SCNC: 127 MMOL/L — LOW (ref 135–145)
SODIUM SERPL-SCNC: 127 MMOL/L — LOW (ref 135–145)
SP GR SPEC: 1.01 — SIGNIFICANT CHANGE UP (ref 1.01–1.02)
SPECIMEN SOURCE: SIGNIFICANT CHANGE UP
TOTAL CHOLESTEROL/HDL RATIO MEASUREMENT: 2.4 RATIO — LOW (ref 3.4–9.6)
TRIGL SERPL-MCNC: 63 MG/DL — SIGNIFICANT CHANGE UP (ref 10–149)
URATE UR-MCNC: 13.2 MG/DL — SIGNIFICANT CHANGE UP
UROBILINOGEN FLD QL: SIGNIFICANT CHANGE UP
UUN UR-MCNC: 431 MG/DL — SIGNIFICANT CHANGE UP
WBC # BLD: 6.54 K/UL — SIGNIFICANT CHANGE UP (ref 3.8–10.5)
WBC # BLD: 6.78 K/UL — SIGNIFICANT CHANGE UP (ref 3.8–10.5)
WBC # FLD AUTO: 6.54 K/UL — SIGNIFICANT CHANGE UP (ref 3.8–10.5)
WBC # FLD AUTO: 6.78 K/UL — SIGNIFICANT CHANGE UP (ref 3.8–10.5)
WBC UR QL: 4 /HPF — SIGNIFICANT CHANGE UP (ref 0–5)

## 2019-11-27 PROCEDURE — 76700 US EXAM ABDOM COMPLETE: CPT | Mod: 26

## 2019-11-27 PROCEDURE — 99222 1ST HOSP IP/OBS MODERATE 55: CPT

## 2019-11-27 RX ORDER — LACTULOSE 10 G/15ML
20 SOLUTION ORAL ONCE
Refills: 0 | Status: COMPLETED | OUTPATIENT
Start: 2019-11-27 | End: 2019-11-27

## 2019-11-27 RX ORDER — LACTULOSE 10 G/15ML
30 SOLUTION ORAL EVERY 4 HOURS
Refills: 0 | Status: DISCONTINUED | OUTPATIENT
Start: 2019-11-27 | End: 2019-12-17

## 2019-11-27 RX ORDER — LACTULOSE 10 G/15ML
10 SOLUTION ORAL EVERY 4 HOURS
Refills: 0 | Status: DISCONTINUED | OUTPATIENT
Start: 2019-11-27 | End: 2019-11-27

## 2019-11-27 RX ORDER — SODIUM CHLORIDE 9 MG/ML
2 INJECTION INTRAMUSCULAR; INTRAVENOUS; SUBCUTANEOUS THREE TIMES A DAY
Refills: 0 | Status: COMPLETED | OUTPATIENT
Start: 2019-11-27 | End: 2019-11-28

## 2019-11-27 RX ADMIN — Medication 1: at 08:44

## 2019-11-27 RX ADMIN — SODIUM CHLORIDE 2 GRAM(S): 9 INJECTION INTRAMUSCULAR; INTRAVENOUS; SUBCUTANEOUS at 22:16

## 2019-11-27 RX ADMIN — URSODIOL 300 MILLIGRAM(S): 250 TABLET, FILM COATED ORAL at 05:31

## 2019-11-27 RX ADMIN — ATORVASTATIN CALCIUM 10 MILLIGRAM(S): 80 TABLET, FILM COATED ORAL at 22:16

## 2019-11-27 RX ADMIN — LACTULOSE 30 GRAM(S): 10 SOLUTION ORAL at 22:15

## 2019-11-27 RX ADMIN — LACTULOSE 20 GRAM(S): 10 SOLUTION ORAL at 18:43

## 2019-11-27 RX ADMIN — URSODIOL 300 MILLIGRAM(S): 250 TABLET, FILM COATED ORAL at 17:38

## 2019-11-27 RX ADMIN — Medication 1: at 17:40

## 2019-11-27 RX ADMIN — LACTULOSE 10 GRAM(S): 10 SOLUTION ORAL at 12:01

## 2019-11-27 RX ADMIN — Medication 1: at 12:33

## 2019-11-27 RX ADMIN — Medication 1 GRAM(S): at 17:39

## 2019-11-27 RX ADMIN — SODIUM CHLORIDE 1 GRAM(S): 9 INJECTION INTRAMUSCULAR; INTRAVENOUS; SUBCUTANEOUS at 05:31

## 2019-11-27 RX ADMIN — Medication 1 GRAM(S): at 05:30

## 2019-11-27 RX ADMIN — TAMSULOSIN HYDROCHLORIDE 0.4 MILLIGRAM(S): 0.4 CAPSULE ORAL at 22:16

## 2019-11-27 RX ADMIN — LACTULOSE 10 GRAM(S): 10 SOLUTION ORAL at 17:37

## 2019-11-27 RX ADMIN — LACTULOSE 10 GRAM(S): 10 SOLUTION ORAL at 05:29

## 2019-11-27 RX ADMIN — Medication 40 MILLIGRAM(S): at 05:30

## 2019-11-27 RX ADMIN — SODIUM CHLORIDE 1 GRAM(S): 9 INJECTION INTRAMUSCULAR; INTRAVENOUS; SUBCUTANEOUS at 12:10

## 2019-11-27 RX ADMIN — PANTOPRAZOLE SODIUM 40 MILLIGRAM(S): 20 TABLET, DELAYED RELEASE ORAL at 06:51

## 2019-11-27 RX ADMIN — INSULIN GLARGINE 20 UNIT(S): 100 INJECTION, SOLUTION SUBCUTANEOUS at 22:15

## 2019-11-27 RX ADMIN — Medication 5 MILLIGRAM(S): at 12:01

## 2019-11-27 NOTE — CONSULT NOTE ADULT - SUBJECTIVE AND OBJECTIVE BOX
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 No fever, n/v/d/c, chest / abd pain, cough, sob, dysuria/hematuria, gi bleed symptoms   GI consulted. The patient reports that he was at rehab and they told him he was "yellow". He states that this morning he is feeling well. He has no abdominal pain, no n/v and is tolerating po intake without dysphagia. He endorses that his last egd was a year ago 2/2 gi bleeding but unable to recall the outcome of the procedure. He denies at recreational drug use or alcohol. He is aware of his cirrhosis and is suppose to take lactulose as outpatient but "sometimes am refusing because they give me too much".       codeine (Anaphylaxis)  NO  RED MEAT (Unknown)      atorvastatin 10 milliGRAM(s) Oral at bedtime  dextrose 40% Gel 15 Gram(s) Oral once PRN  dextrose 5%. 1000 milliLiter(s) IV Continuous <Continuous>  dextrose 50% Injectable 12.5 Gram(s) IV Push once  dextrose 50% Injectable 25 Gram(s) IV Push once  dextrose 50% Injectable 25 Gram(s) IV Push once  glucagon  Injectable 1 milliGRAM(s) IntraMuscular once PRN  insulin glargine Injectable (LANTUS) 20 Unit(s) SubCutaneous at bedtime  insulin lispro (HumaLOG) corrective regimen sliding scale   SubCutaneous three times a day before meals  insulin lispro (HumaLOG) corrective regimen sliding scale   SubCutaneous at bedtime  lactulose Syrup 10 Gram(s) Oral every 6 hours  pantoprazole    Tablet 40 milliGRAM(s) Oral before breakfast  phytonadione   Solution 5 milliGRAM(s) Oral daily  rifAXIMin 550 milliGRAM(s) Oral two times a day  sucralfate 1 Gram(s) Oral two times a day  tamsulosin 0.4 milliGRAM(s) Oral at bedtime  ursodiol Capsule 300 milliGRAM(s) Oral two times a day        FAMILY HISTORY:  Family history of type 2 diabetes mellitus  Family history of hypertension  Family history of stomach cancer        Review of Systems:    General:  No wt loss, fevers, chills, night sweats, fatigue  Eyes:  Good vision, no reported pain  ENT:  No sore throat, pain, runny nose, dysphagia  CV:  No pain, palpitations, no lightheadedness  Resp:  No dyspnea, cough, tachypnea, wheezing  GI: as above  :  No pain, bleeding, incontinence, nocturia  Muscle:  No pain, weakness  Neuro:  No weakness, tingling, memory problems  Psych:  No fatigue, insomnia, mood problems, depression  Endocrine:  No polyuria, polydypsia, cold/heat intolerance  Heme:  No petechiae, ecchymosis, easy bruisability  Skin:  No rash, tattoos, scars, edema    Relevant Family History:   n/c    Relevant Social History: n/c      Physical Exam:    Vital Signs:  Vital Signs Last 24 Hrs  T(C): 36.3 (2019 08:24), Max: 36.7 (2019 15:30)  T(F): 97.4 (2019 08:24), Max: 98.1 (2019 15:30)  HR: 73 (2019 08:24) (72 - 83)  BP: 98/55 (2019 08:24) (94/54 - 109/62)  BP(mean): --  RR: 18 (2019 08:24) (17 - 18)  SpO2: 98% (2019 08:24) (97% - 100%)  Daily Height in cm: 185.42 (2019 18:20)    Daily Weight in k.2 (2019 06:15)    General:  Appears stated age, well-groomed, nad  HEENT:  NC/AT,  conjunctivae clear and pink, no thyromegaly, nodules, adenopathy, no JVD  Chest:  Full & symmetric excursion, no increased effort, breath sounds clear  Cardiovascular:  Regular rhythm, S1, S2, no murmur/rub/S3/S4, no abdominal bruit, no edema  Abdomen:  Soft, non-tender, non-distended, normoactive bowel sounds,  no masses , obese abdomen   Extremities:  no cyanosis,clubbing or edema  Skin:  No rash/erythema/ecchymoses/petechiae/wounds/abscess/warm/dry  Neuro/Psych:  A&Ox3  , no asterixis, no tremor, no encephalopathy    Laboratory:                            9.9    6.78  )-----------( 71       ( 2019 08:17 )             28.3         125<L>  |  79<L>  |  70<H>  ----------------------------<  183<H>  3.9   |  30  |  2.67<H>    Ca    9.9      2019 06:31    TPro  6.7  /  Alb  3.2<L>  /  TBili  8.4<H>  /  DBili  3.5<H>  /  AST  34  /  ALT  24  /  AlkPhos  191<H>      LIVER FUNCTIONS - ( 2019 06:31 )  Alb: 3.2 g/dL / Pro: 6.7 g/dL / ALK PHOS: 191 U/L / ALT: 24 U/L / AST: 34 U/L / GGT: x           PT/INR - ( 2019 07:56 )   PT: 22.9 sec;   INR: 1.97 ratio         PTT - ( 2019 10:38 )  PTT:35.2 sec  Urinalysis Basic - ( 2019 00:21 )    Color: Yellow / Appearance: Slightly Turbid / S.012 / pH: x  Gluc: x / Ketone: Negative  / Bili: Negative / Urobili: <2 mg/dL   Blood: x / Protein: Negative / Nitrite: Negative   Leuk Esterase: Negative / RBC: 14 /HPF / WBC 4 /HPF   Sq Epi: x / Non Sq Epi: 0 /HPF / Bacteria: Negative      Amylase Serum--      Lipase serum--       [id number]    Imaging:        < from: US Abdomen Complete (19 @ 11:31) >    EXAM:  US ABDOMEN COMPLETE                            PROCEDURE DATE:  2019            INTERPRETATION:  CLINICAL INFORMATION: Right upper quadrant pain with   worsening jaundice. Evaluate for obstruction.. Creatinine is 2.55. Status   post fall.    COMPARISON: CT performed on 2019. Renal sonography 10/26/2019.    TECHNIQUE: Sonography of the abdomen.     FINDINGS:    Limited evaluation due to poor acoustic window.    Liver: Right lobe measures 13 cm in length. Coarsened echotexture.  Nodular contour consistent with hepatic cirrhosis.    Bile ducts: Normal caliber. Common bile duct measures 4 mm.     Gallbladder: Status post cholecystectomy.        Pancreas: Limited    Spleen: 13.2 cm. Within normal limits.    Right kidney: 13.3cm. No hydronephrosis.    Left kidney: 13.7 cm.  No hydronephrosis. Nonobstructing renal stone in   the lower pole measuring 0.8 x 0.8 cm.    Ascites: Trace perihepatic ascites.    Aorta and IVC: Size abdominal aorta reveals mild atherosclerosis.   Visualized inferior vena cava is limited.    IMPRESSION:     Status post cholecystectomy. No intra or extrahepatic biliary ductal   dilatation.    Hepatic cirrhosis.     Trace perihepatic ascites.                  FERNANDO JONES MD,RADIOLOGY FELLOW  This document has been electronically signed.  WHITNEY BASURTO M.D., ATTENDING RADIOLOGIST  This document has been electronically signed. 2019 12:22PM                < end of copied text >    < from: CT Abdomen and Pelvis No Cont (19 @ 12:55) >    EXAM:  CT ABDOMEN AND PELVIS                          EXAM:  CT CHEST                            PROCEDURE DATE:  2019            INTERPRETATION:  CLINICAL INFORMATION: Cirrhosis. Recent fall. Assess for   traumatic injury.    COMPARISON: CT chest 10/26/2019, CT abdomen pelvis 10/23/2019    PROCEDURE:   CT of the Chest, Abdomen and Pelvis was performed without intravenous   contrast.   Intravenous contrast: None.  Oral contrast: None.  Sagittal and coronal reformats were performed.    FINDINGS:    CHEST:     LUNGS AND LARGE AIRWAYS: Patent central airways. No pulmonary nodules.  PLEURA: No pleural effusion.  VESSELS: Atherosclerotic changes of the aorta and coronary arteries.  HEART: Heart size is normal. No pericardial effusion.  MEDIASTINUM AND RONNIE: No lymphadenopathy.  CHEST WALL AND LOWER NECK: A new 1.5 cm rounded high density focus within   the left chest wall (3:22) from prior imaging 10/26/2019 may represent a   subcutaneous hematoma.    ABDOMEN AND PELVIS:    LIVER: Cirrhosis. Limited evaluation for focal lesion given lack of   intravenous contrast.  BILE DUCTS: Normal caliber.  GALLBLADDER: Within normal limits.  SPLEEN: Within normal limits.  PANCREAS: Within normal limits.  ADRENALS: Within normal limits.  KIDNEYS/URETERS: A 4 mm nonobstructing left renal calculus.    BLADDER: Within normal limits.  REPRODUCTIVE ORGANS: Prostate within normal limits.    BOWEL: No bowel obstruction. Appendix is normal.  PERITONEUM: No ascites.  VESSELS: Atherosclerotic changes.Upper abdominal varices.  RETROPERITONEUM/LYMPH NODES: No lymphadenopathy.    ABDOMINAL WALL: Within normal limits.  BONES: Degenerative changes.    IMPRESSION:     A new 1.5 cm rounded high density focus within the left chest wall from   prior imaging 10/26/2019 may represent a subcutaneous hematoma.   Otherwise, no evidence of acute traumatic injury.    Cirrhosis. No ascites.                          RICHARD PUCKETT M.D., ATTENDING RADIOLOGIST  This document has been electronically signed. 2019  1:19PM                < end of copied text >

## 2019-11-27 NOTE — CONSULT NOTE ADULT - ASSESSMENT
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     #) MISA - likely sec to overdiuresis   #) Hyponatremia - sec to diuretics   #) Encephalopathy - multifactorial - hepatic + uremia   #) Worsening Jaundice   #) Left chest wall hematoma s/p fall     #) Decompensated Liver Cirrhosis (NAFLD) , MELD Na - 35 on 11/27/2019   -H/o  Ascites, LVP, no sbp  - currently no ascites, at home was on lasix 80 q12, aldactone 50 daily,   - Last EGD on file 5/2019 - no EV   - HE- currently mild asterix , on lactulose and rifaximin   - HCC screening - recent CT abd non con - no focal hepatic lesion   -Coagulopathy INR of 1.97   - Thrombocytopenia - plt - 62   Rec  Monitor Daily CMP, INR , Strict input and output   Hold diuretics   R/o acute infection , check blood and urine culture   Titrate lactulose to 3-4 bm/day  c/w Rifaximin   vit k po 5 mg daily for 3 days   USG liver

## 2019-11-27 NOTE — DISCHARGE NOTE NURSING/CASE MANAGEMENT/SOCIAL WORK - PATIENT PORTAL LINK FT
You can access the FollowMyHealth Patient Portal offered by [facility] by registering at the following website: [email or web address]. By joining [employer]’s FollowMyHealth portal, you will also be able to view your health information using other applications (apps) compatible with our system.

## 2019-11-27 NOTE — PROGRESS NOTE ADULT - PROBLEM SELECTOR PROBLEM 4
Diabetes " Thursday I was moving for 13 hours and I got this pain, Friday I can barely walk" pain in left foot

## 2019-11-27 NOTE — CONSULT NOTE ADULT - ASSESSMENT
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

## 2019-11-27 NOTE — PROGRESS NOTE ADULT - ASSESSMENT
pt with above history p/w difficulty ambulation / weakness       - flap tremor + on Exam , likely hepatic encephalopathy -  lactulose po  - titrate to 2-3 bms- head ct / fall precautions    - hyperbilirubinemia- ultrasound liver to r/o obstruction , GI f/u , cont rifaximin     - ARF on ckd stage 3 - likely hepatorenal , avoid nephrotoxic agents bladder scan to r/o retention , renal f/u - pt on aldactone+ Lasix+ zaroxolyn - pt doesn't appear fluid overloaded at present, hold diuretics    - dm2 - iss     - Thrombocytopenia - likely sec to liver pathology     - coagulopathy - likely sec to liver pathology , monitor closely for active signs of bleeding , vit k     - HLD - pt om parvastaTIN     ppi

## 2019-11-27 NOTE — PROGRESS NOTE ADULT - SUBJECTIVE AND OBJECTIVE BOX
SUBJECTIVE / OVERNIGHT EVENTS: pt denies headache, nausea,v   chest pain, shortness of breath   no bm still, + flatus      MEDICATIONS  (STANDING):  atorvastatin 10 milliGRAM(s) Oral at bedtime  dextrose 5%. 1000 milliLiter(s) (50 mL/Hr) IV Continuous <Continuous>  dextrose 50% Injectable 12.5 Gram(s) IV Push once  dextrose 50% Injectable 25 Gram(s) IV Push once  dextrose 50% Injectable 25 Gram(s) IV Push once  insulin glargine Injectable (LANTUS) 20 Unit(s) SubCutaneous at bedtime  insulin lispro (HumaLOG) corrective regimen sliding scale   SubCutaneous three times a day before meals  insulin lispro (HumaLOG) corrective regimen sliding scale   SubCutaneous at bedtime  lactulose Syrup 30 Gram(s) Oral every 4 hours  pantoprazole    Tablet 40 milliGRAM(s) Oral before breakfast  phytonadione   Solution 5 milliGRAM(s) Oral daily  rifAXIMin 550 milliGRAM(s) Oral two times a day  sodium chloride 2 Gram(s) Oral three times a day  sucralfate 1 Gram(s) Oral two times a day  tamsulosin 0.4 milliGRAM(s) Oral at bedtime  ursodiol Capsule 300 milliGRAM(s) Oral two times a day    MEDICATIONS  (PRN):  dextrose 40% Gel 15 Gram(s) Oral once PRN Blood Glucose LESS THAN 70 milliGRAM(s)/deciliter  glucagon  Injectable 1 milliGRAM(s) IntraMuscular once PRN Glucose LESS THAN 70 milligrams/deciliter      Vital Signs Last 24 Hrs  T(C): 36.3 (2019 08:24), Max: 36.5 (2019 04:12)  T(F): 97.4 (2019 08:24), Max: 97.7 (2019 04:12)  HR: 73 (2019 08:24) (73 - 83)  BP: 98/55 (2019 08:24) (94/54 - 98/59)  BP(mean): --  RR: 18 (2019 08:24) (17 - 18)  SpO2: 98% (2019 08:24) (97% - 98%)  CAPILLARY BLOOD GLUCOSE      POCT Blood Glucose.: 181 mg/dL (2019 17:39)  POCT Blood Glucose.: 165 mg/dL (2019 12:27)  POCT Blood Glucose.: 172 mg/dL (2019 08:27)  POCT Blood Glucose.: 222 mg/dL (2019 21:21)  POCT Blood Glucose.: 155 mg/dL (2019 19:27)    I&O's Summary    2019 07:  -  2019 07:00  --------------------------------------------------------  IN: 120 mL / OUT: 1000 mL / NET: -880 mL    2019 07:  -  2019 19:09  --------------------------------------------------------  IN: 780 mL / OUT: 1575 mL / NET: -795 mL        Constitutional: No fever, fatigue  Skin: No rash.  Eyes: No recent vision problems or eye pain.  ENT: No congestion, ear pain, or sore throat.  Cardiovascular: No chest pain or palpation.  Respiratory: No cough, shortness of breath, congestion, or wheezing.  Gastrointestinal: No abdominal pain, nausea, vomiting, or diarrhea.  Genitourinary: No dysuria.  Musculoskeletal: No joint swelling.  Neurologic: No headache.    PHYSICAL EXAM:  GENERAL: NAD  EYES: EOMI, PERRLA  NECK: Supple, No JVD  CHEST/LUNG: dec breath sounds at bases   HEART:  S1 , S2 +  ABDOMEN: soft , bs+, mild distension + , no tenderness  EXTREMITIES:  no edema  NEUROLOGY:alert awake oriented   flap tremor+      LABS:                        10.1   6.54  )-----------( 74       ( 2019 18:42 )             27.4     11    127<L>  |  81<L>  |  70<H>  ----------------------------<  195<H>  3.9   |  31  |  2.63<H>    Ca    10.0      2019 18:42    TPro  6.7  /  Alb  3.2<L>  /  TBili  8.4<H>  /  DBili  3.5<H>  /  AST  34  /  ALT  24  /  AlkPhos  191<H>  11-    PT/INR - ( 2019 07:56 )   PT: 22.9 sec;   INR: 1.97 ratio         PTT - ( 2019 10:38 )  PTT:35.2 sec      Urinalysis Basic - ( 2019 00:21 )    Color: Yellow / Appearance: Slightly Turbid / S.012 / pH: x  Gluc: x / Ketone: Negative  / Bili: Negative / Urobili: <2 mg/dL   Blood: x / Protein: Negative / Nitrite: Negative   Leuk Esterase: Negative / RBC: 14 /HPF / WBC 4 /HPF   Sq Epi: x / Non Sq Epi: 0 /HPF / Bacteria: Negative        RADIOLOGY & ADDITIONAL TESTS:    Imaging Personally Reviewed:    Consultant(s) Notes Reviewed:      Care Discussed with Consultants/Other Providers:

## 2019-11-27 NOTE — CONSULT NOTE ADULT - SUBJECTIVE AND OBJECTIVE BOX
Patient is a 63y old  Male who presents with a chief complaint of difficulty to ambulate/ weakness (27 Nov 2019 08:27)    From HPI" HPI:  63M w/ pmh HTN, HLD, DM, non-alcoholic cirrhosis, chronic thrombocytopenia -- sent from rehab for worsening jaundice, dizzy/weakness/falls x 4 days. Discharged to rehab after last hospitalization. No fever, n/v/d/c, chest / abd pain, cough, sob, dysuria/hematuria. recently admitted at OSH for sepsis, hypoglycemia, cirrhosis (lactulose, xifaxan), hepatic encephalopathy, GI bleed; G+ bacteremia, EGD, last bm yesterday morning (26 Nov 2019 17:37)  "    Above verified-agree with above unless noted below.  h/o strep bacteremia in 10/19 s/p Rx     ID consulted     PAST MEDICAL & SURGICAL HISTORY:  GIB (gastrointestinal bleeding)  GERD with esophagitis: Gastritis &amp; Non Bleeding Ulcers  Hepatic encephalopathy  Obesity  Fatty liver disease, nonalcoholic  Renal stones: 25 years ago  Hypertension  Neuropathy  Hypercholesteremia  Diabetes  S/P cholecystectomy      Social history:  denies    Smoking, says last    ETOH 3-4 years ago denies    IVDU       FAMILY HISTORY:  Family history of type 2 diabetes mellitus  Family history of hypertension  Family history of stomach cancer  - Family history of medical problems in all first degree relatives reviewed.None of these were found to be related to patients current illness.    REVIEW OF SYSTEMS  General:+ malaise fatigue occ chills. Fevers absent    Skin:No rash  	  Ophthalmologic:Denies any visual complaints,discharge redness or photophobia  	  ENMT:No nasal discharge,headache,sinus congestion or throat pain.No dental complaints    Respiratory and Thorax:No cough,sputum or chest pain.Denies shortness of breath  	  Cardiovascular:	No chest pain,palpitaions or dizziness    Gastrointestinal:	NO nausea,abdominal pain or diarrhea.    Genitourinary:	No dysuria,frequency. No flank pain    Musculoskeletal:	No joint swelling or pain.No weakness    Neurological:No confusion,diziness.+ generalized  extremity weakness.No bladder or bowel incontinence	    Psychiatric:No delusions or hallucinations	    Hematology/Lymphatics:	No LN swelling.No gum bleeding     Endocrine:	No recent weight gain or loss.No abnormal heat/cold intolerance    Allergic/Immunologic:	No hives or rash   Allergies    codeine (Anaphylaxis)    Intolerances    NO  RED MEAT (Unknown)      Antimicrobials:    rifAXIMin 550 milliGRAM(s) Oral two times a day      Prior Antimicrobials:  MEDICATIONS  (STANDING):  rifAXIMin   550 milliGRAM(s) Oral (11-27-19 @ 05:30)      Other medications reviewed      Vital Signs Last 24 Hrs  T(C): 36.3 (27 Nov 2019 08:24), Max: 36.7 (26 Nov 2019 15:30)  T(F): 97.4 (27 Nov 2019 08:24), Max: 98.1 (26 Nov 2019 15:30)  HR: 73 (27 Nov 2019 08:24) (72 - 83)  BP: 98/55 (27 Nov 2019 08:24) (94/54 - 109/62)  BP(mean): --  RR: 18 (27 Nov 2019 08:24) (17 - 18)  SpO2: 98% (27 Nov 2019 08:24) (97% - 100%)    PHYSICAL EXAM:Pleasant patient in no acute distress.      Constitutional:Comfortable.Awake and alert  No cachexia     Eyes:PERRL EOMI.NO discharge or conjunctival injection    ENMT:No sinus tenderness.No thrush.No pharyngeal exudate or erythema.Fair dental hygiene    Neck:Supple,No LN,no JVD      Respiratory:Good air entry bilaterally,CTA    Left chest wall ecchymosis no tenderness    Cardiovascular:S1 S2 wnl, No murmurs,rub or gallops    Gastrointestinal:Soft BS(+) no tenderness no masses ,No rebound or guarding  No FF felt     Genitourinary:No CVA tendereness         Extremities:No cyanosis,clubbing mild  edema.    Vascular:peripheral pulses felt    Neurological:AAO X 3,No grossly focal deficits            Musculoskeletal:No joint swelling or LOM    Psychiatric:Affect normal.          Labs:                            9.9    6.78  )-----------( 71       ( 27 Nov 2019 08:17 )             28.3       WBC Count: 6.78 (11-27-19 @ 08:17)  WBC Count: 6.15 (11-26-19 @ 10:37)    11-27    125<L>  |  79<L>  |  70<H>  ----------------------------<  183<H>  3.9   |  30  |  2.67<H>        Ca    9.9      27 Nov 2019 06:31    TPro  6.7  /  Alb  3.2<L>  /  TBili  8.4<H>  /  DBili  3.5<H>  /  AST  34  /  ALT  24  /  AlkPhos  191<H>  11-27        Bilirubin Total, Serum: 4.6 mg/dL (11.03.19 @ 06:14)        < from: CT Abdomen and Pelvis No Cont (11.26.19 @ 12:55) >  IMPRESSION:     A new 1.5 cm rounded high density focus within the left chest wall from   prior imaging 10/26/2019 may represent a subcutaneous hematoma.   Otherwise, no evidence of acute traumatic injury.    Cirrhosis. No ascites.      < end of copied text >    < from: CT Chest No Cont (11.26.19 @ 12:55) >  IMPRESSION:     A new 1.5 cm rounded high density focus within the left chest wall from   prior imaging 10/26/2019 may represent a subcutaneous hematoma.   Otherwise, no evidence of acute traumatic injury.    Cirrhosis. No ascites.                  < end of copied text >      Urinalysis + Microscopic Examination (11.27.19 @ 00:21)    Protein, Urine: Negative    Leukocyte Esterase Concentration: Negative    pH Urine: 6.0    Specific Gravity: 1.012    Urine Appearance: Slightly Turbid    Urobilinogen: <2 mg/dL    Nitrite: Negative    Blood, Urine: Large    Color: Yellow    Bilirubin: Negative    Ketone - Urine: Negative    Glucose Qualitative, Urine: Negative    Red Blood Cell - Urine: 14 /HPF    White Blood Cell - Urine: 4 /HPF    Epithelial Cells: 0 /HPF    Hyaline Casts: 3 /LPF    Bacteria: Negative    Culture - Blood (10.24.19 @ 11:46)    Culture Results:   No growth at 5 days.    Rapid HIV-1/2 Antibody (10.23.19 @ 05:08)    Rapid HIV-1/2 Antibody: Nonreact      Culture - Blood (10.22.19 @ 17:55)    -  Streptococcus agalactiae (Group B): Detec    Specimen Source: .Blood    Organism: Blood Culture PCR    Organism: Streptococcus agalactiae (Group B)    Culture Results:   Growth in aerobic and anaerobic bottles: Streptococcus agalactiae (Group  B)  Aerobic Bottle: 08:57 Hours to positivity  Anaerobic Bottle: 08:56 Hours to positivity  .  ***Blood Panel PCR results on this specimen are available  approximately 3 hours after the Gram stain result.***  Gram stain, PCR, and/or culture results may not always  correspond due to difference in methodologies.  ************************************************************  This PCR assay was performed using [employer].  The following targets are tested for: Enterococcus,  vancomycin resistant enterococci, Listeria monocytogenes,  coagulase negative staphylococci, S. aureus,  methicillin resistant S. aureus, Streptococcus agalactiae  (Group B), S. pneumoniae, S. pyogenes (Group A),  Acinetobacter baumannii, Enterobacter cloacae, E. coli,  Klebsiella oxytoca, K. pneumoniae, Proteus sp.,  Serratia marcescens, Haemophilus influenzae,  Neisseria meningitidis, Pseudomonas aeruginosa, Candida  albicans, C. glabrata, C krusei, C parapsilosis,  C. tropicalis and the KPC resistance gene.  "Due to technical problems, Proteus sp. will Not be reported as part of  the BCID panel until further notice"  .  TYPE: (C=Critical, N=Notification, A=Abnormal) c  TESTS:  _ gs  DATE/TIME CALLED: _ 10/23/2019 09:18:36  CALLED TO: Tk devi rn  READ BACK (2 Patient Identifiers)(Y/N): _ y  READ BACK VALUES (Y/N): _ y  CALLED BY: Tk corona  .  TYPE: (C=Critical, N=Notification, A=Abnormal) c  TESTS:  _ BSB  DATE/TIME CALLED: _ 10/23/2019 11:06:21  CALLED TO: Tk devi rn  READ BACK (2 Patient Identifiers)(Y/N): _ y  READ BACK VALUES (Y/N): _ y  CALLED BY: Tk corona    Organism Identification: Streptococcus agalactiae (Group B)  Blood Culture PCR    Method Type: PCR    Method Type: LEONARDO

## 2019-11-27 NOTE — PROGRESS NOTE ADULT - SUBJECTIVE AND OBJECTIVE BOX
Patient is a 63y Male whom presented to the hospital with ckd and dena     PAST MEDICAL & SURGICAL HISTORY:  GIB (gastrointestinal bleeding)  GERD with esophagitis: Gastritis &amp; Non Bleeding Ulcers  Hepatic encephalopathy  Obesity  Fatty liver disease, nonalcoholic  Renal stones: 25 years ago  Hypertension  Neuropathy  Hypercholesteremia  Diabetes  S/P cholecystectomy      MEDICATIONS  (STANDING):  dextrose 5%. 1000 milliLiter(s) (50 mL/Hr) IV Continuous <Continuous>  dextrose 50% Injectable 12.5 Gram(s) IV Push once  dextrose 50% Injectable 25 Gram(s) IV Push once  dextrose 50% Injectable 25 Gram(s) IV Push once  insulin lispro (HumaLOG) corrective regimen sliding scale   SubCutaneous three times a day before meals  insulin lispro (HumaLOG) corrective regimen sliding scale   SubCutaneous at bedtime      Allergies    codeine (Anaphylaxis)    Intolerances    NO  RED MEAT (Unknown)      SOCIAL HISTORY:  Denies ETOh,Smoking,     FAMILY HISTORY:  Family history of type 2 diabetes mellitus  Family history of hypertension  Family history of stomach cancer      REVIEW OF SYSTEMS:    CONSTITUTIONAL: No weakness, fevers or chills  EYES/ENT: No visual changes;  no throat pain   NECK: No pain or stiffness  RESPIRATORY: No cough, wheezing, hemoptysis; No shortness of breath  CARDIOVASCULAR: No chest pain or palpitations  GASTROINTESTINAL: No abdominal or epigastric pain. No nausea, vomiting,     No diarrhea or constipation. No melena   GENITOURINARY: No dysuria, frequency or hematuria  NEUROLOGICAL: No numbness or weakness  SKIN: dry                          9.9    6.78  )-----------( 71       ( 2019 08:17 )             28.3       CBC Full  -  ( 2019 08:17 )  WBC Count : 6.78 K/uL  RBC Count : 2.95 M/uL  Hemoglobin : 9.9 g/dL  Hematocrit : 28.3 %  Platelet Count - Automated : 71 K/uL  Mean Cell Volume : 95.9 fl  Mean Cell Hemoglobin : 33.6 pg  Mean Cell Hemoglobin Concentration : 35.0 gm/dL  Auto Neutrophil # : 4.57 K/uL  Auto Lymphocyte # : 1.34 K/uL  Auto Monocyte # : 0.68 K/uL  Auto Eosinophil # : 0.11 K/uL  Auto Basophil # : 0.04 K/uL  Auto Neutrophil % : 67.4 %  Auto Lymphocyte % : 19.8 %  Auto Monocyte % : 10.0 %  Auto Eosinophil % : 1.6 %  Auto Basophil % : 0.6 %          125<L>  |  79<L>  |  70<H>  ----------------------------<  183<H>  3.9   |  30  |  2.67<H>    Ca    9.9      2019 06:31    TPro  6.7  /  Alb  3.2<L>  /  TBili  8.4<H>  /  DBili  3.5<H>  /  AST  34  /  ALT  24  /  AlkPhos  191<H>        CAPILLARY BLOOD GLUCOSE      POCT Blood Glucose.: 165 mg/dL (2019 12:27)  POCT Blood Glucose.: 172 mg/dL (2019 08:27)  POCT Blood Glucose.: 222 mg/dL (2019 21:21)  POCT Blood Glucose.: 155 mg/dL (2019 19:27)      Vital Signs Last 24 Hrs  T(C): 36.3 (2019 08:24), Max: 36.5 (2019 04:12)  T(F): 97.4 (2019 08:24), Max: 97.7 (2019 04:12)  HR: 73 (2019 08:24) (73 - 83)  BP: 98/55 (2019 08:24) (94/54 - 98/59)  BP(mean): --  RR: 18 (2019 08:24) (17 - 18)  SpO2: 98% (2019 08:24) (97% - 100%)    Urinalysis Basic - ( 2019 00:21 )    Color: Yellow / Appearance: Slightly Turbid / S.012 / pH: x  Gluc: x / Ketone: Negative  / Bili: Negative / Urobili: <2 mg/dL   Blood: x / Protein: Negative / Nitrite: Negative   Leuk Esterase: Negative / RBC: 14 /HPF / WBC 4 /HPF   Sq Epi: x / Non Sq Epi: 0 /HPF / Bacteria: Negative        PT/INR - ( 2019 07:56 )   PT: 22.9 sec;   INR: 1.97 ratio         PTT - ( 2019 10:38 )  PTT:35.2 sec      PHYSICAL EXAM:    Constitutional: NAD  HEENT: conjunctive   clear   Neck:  No JVD  Respiratory: CTAB  Cardiovascular: S1 and S2  Gastrointestinal: BS+, soft, NT/ND  Extremities: No peripheral edema  Neurological: A/O x 3, no focal deficits  Psychiatric: Normal mood, normal affect  : No Beasley  Skin: No rashes  Access: Not applicable

## 2019-11-27 NOTE — CONSULT NOTE ADULT - SUBJECTIVE AND OBJECTIVE BOX
GI HPI:  Patient is a 63y old  Male who presents with a chief complaint of difficulty to ambulate/ weakness (26 Nov 2019 17:37)  . Patient complaining of       Hospital course:  63M w/ pmh HTN, HLD, DM, non-alcoholic cirrhosis, chronic thrombocytopenia -- sent from rehab for worsening jaundice, dizzy/weakness/falls x 4 days. Discharged to rehab after last hospitalization. No fever, n/v/d/c, chest / abd pain, cough, sob, dysuria/hematuria. recently admitted at OSH for sepsis, hypoglycemia, cirrhosis (lactulose, xifaxan), hepatic encephalopathy, GI bleed; G+ bacteremia, EGD, last bm yesterday morning (26 Nov 2019 17:37)      PAST MEDICAL & SURGICAL HISTORY  GIB (gastrointestinal bleeding)  GERD with esophagitis: Gastritis &amp; Non Bleeding Ulcers  Hepatic encephalopathy  Obesity  Fatty liver disease, nonalcoholic  Renal stones: 25 years ago  Hypertension  Neuropathy  Hypercholesteremia  Diabetes  S/P cholecystectomy      FAMILY HISTORY:  FAMILY HISTORY:  Family history of type 2 diabetes mellitus  Family history of hypertension  Family history of stomach cancer      SOCIAL HISTORY:  smoker:   Alcohol:  Drug:    ALLERGIES:  codeine (Anaphylaxis)      MEDICATIONS:  MEDICATIONS  (STANDING):  atorvastatin 10 milliGRAM(s) Oral at bedtime  dextrose 5%. 1000 milliLiter(s) (50 mL/Hr) IV Continuous <Continuous>  dextrose 50% Injectable 12.5 Gram(s) IV Push once  dextrose 50% Injectable 25 Gram(s) IV Push once  dextrose 50% Injectable 25 Gram(s) IV Push once  furosemide    Tablet 40 milliGRAM(s) Oral two times a day  insulin glargine Injectable (LANTUS) 20 Unit(s) SubCutaneous at bedtime  insulin lispro (HumaLOG) corrective regimen sliding scale   SubCutaneous three times a day before meals  insulin lispro (HumaLOG) corrective regimen sliding scale   SubCutaneous at bedtime  lactulose Syrup 10 Gram(s) Oral every 6 hours  pantoprazole    Tablet 40 milliGRAM(s) Oral before breakfast  phytonadione   Solution 5 milliGRAM(s) Oral daily  rifAXIMin 550 milliGRAM(s) Oral two times a day  sodium chloride 1 Gram(s) Oral three times a day  sucralfate 1 Gram(s) Oral two times a day  tamsulosin 0.4 milliGRAM(s) Oral at bedtime  ursodiol Capsule 300 milliGRAM(s) Oral two times a day    MEDICATIONS  (PRN):  dextrose 40% Gel 15 Gram(s) Oral once PRN Blood Glucose LESS THAN 70 milliGRAM(s)/deciliter  glucagon  Injectable 1 milliGRAM(s) IntraMuscular once PRN Glucose LESS THAN 70 milligrams/deciliter      HOME MEDICATIONS:  Home Medications:  furosemide 80 mg oral tablet: 1 tab(s) orally 2 times a day (26 Nov 2019 15:44)  insulin glargine: 40 unit(s) subcutaneous once a day (26 Nov 2019 15:44)  metOLazone 2.5 mg oral tablet: 1 tab(s) orally every other day    Note: Daughter hasn&#x27;t given since it wasn&#x27;t on rehab discharge. (26 Nov 2019 15:44)  phytonadione: 1 tab(s) orally once a day (26 Nov 2019 15:44)  potassium chloride 10 mEq oral capsule, extended release: 1 cap(s) orally once a day (26 Nov 2019 15:44)  pravastatin 40 mg oral tablet: 1 tab(s) orally 2 times a day    Note:Pharmacy has once a day. (26 Nov 2019 15:44)  Reglan 5 mg oral tablet: 1 tab(s) orally once a day    Note:Pharmacy has 3 times a day. (26 Nov 2019 15:44)  rifAXIMin 550 mg oral tablet: 1 tab(s) orally 2 times a day (26 Nov 2019 15:44)  spironolactone 25 mg oral tablet: 1 tab(s) orally 2 times a day    Note:Pharmacy has directions as once a day. (26 Nov 2019 15:44)  sucralfate 1 g oral tablet: 1 tab(s) orally 2 times a day    Note:Pharmacy has 4 times a day. (26 Nov 2019 15:44)  tamsulosin 0.4 mg oral capsule: 1 cap(s) orally once a day (at bedtime) (26 Nov 2019 15:44)  ursodiol 300 mg oral capsule: 1 cap(s) orally 2 times a day (26 Nov 2019 15:44)  Vitamin D3: 1 tab(s) orally once a day (26 Nov 2019 15:44)      ROS:     REVIEW OF SYSTEMS  General:  No fevers  Eyes:  No reported pain   ENT:  No sore throat   NECK: No stiffness   CV:  No chest pain   Resp:  No shortness of breath  GI:  See HPI  :  No dysuria  Muscle:  No weakness  Neuro:  No tingling  Endocrine:  No polyuria  Heme:  No ecchymosis          VITALS:   T(F): 97.4 (11-27 @ 08:24), Max: 98.1 (11-26 @ 15:30)  HR: 73 (11-27 @ 08:24) (64 - 83)  BP: 98/55 (11-27 @ 08:24) (88/49 - 109/62)  BP(mean): --  RR: 18 (11-27 @ 08:24) (17 - 22)  SpO2: 98% (11-27 @ 08:24) (97% - 100%)    I&O's Summary    26 Nov 2019 07:01  -  27 Nov 2019 07:00  --------------------------------------------------------  IN: 120 mL / OUT: 1000 mL / NET: -880 mL        PHYSICAL EXAM:  EYES: No scleral icterus   LUNG: Clear to auscultation bilaterally; No rales, rhonchi, wheezing, or rubs  HEART: RRR; No murmurs  ABDOMEN: Soft, +BS, Abdominal Tenderness, No guarding, No Whitaker Sign   Rectal Exam:     LABS:                        11.1   6.15  )-----------( 62       ( 26 Nov 2019 10:37 )             30.8     PT/INR - ( 27 Nov 2019 07:56 )  INR: 1.97          PTT - ( 26 Nov 2019 10:38 )  PTT:35.2   LIVER FUNCTIONS - ( 27 Nov 2019 06:31 )  Alb: 3.2 g/dL / Pro: 6.7 g/dL / ALK PHOS: 191 U/L / ALT: 24 U/L / AST: 34 U/L / GGT: x           11-27    125<L>  |  79<L>  |  70<H>  ----------------------------<  183<H>  3.9   |  30  |  2.67<H>    Ca    9.9      27 Nov 2019 06:31              Previous EGD:    Previous colonoscopy:       RADIOLOGY: GI HPI:  Patient is a 63y old  Male who presents with a chief complaint of difficulty to ambulate/ weakness (26 Nov 2019 17:37)  63M w/ pmh obesity,  HTN, HLD, DM,  Decompensated NAFLD cirrhosis, Ascites, hepatic encephalopathy , chronic thrombocytopenia , coagulopathy , 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 No fever, n/v/d/c, chest / abd pain, cough, sob, dysuria/hematuria, gi bleed symptoms.         PAST MEDICAL & SURGICAL HISTORY  GIB (gastrointestinal bleeding)  GERD with esophagitis: Gastritis &amp; Non Bleeding Ulcers  Hepatic encephalopathy  Obesity  Fatty liver disease, nonalcoholic  Renal stones: 25 years ago  Hypertension  Neuropathy  Hypercholesteremia  Diabetes  S/P cholecystectomy      FAMILY HISTORY:  FAMILY HISTORY:  Family history of type 2 diabetes mellitus  Family history of hypertension  Family history of stomach cancer      SOCIAL HISTORY:  smoker:  denies   Alcohol: denies, quit 3 years ago and even prior drank socially   Drug: denies     ALLERGIES:  codeine (Anaphylaxis)      MEDICATIONS:  MEDICATIONS  (STANDING):  atorvastatin 10 milliGRAM(s) Oral at bedtime  dextrose 5%. 1000 milliLiter(s) (50 mL/Hr) IV Continuous <Continuous>  dextrose 50% Injectable 12.5 Gram(s) IV Push once  dextrose 50% Injectable 25 Gram(s) IV Push once  dextrose 50% Injectable 25 Gram(s) IV Push once  furosemide    Tablet 40 milliGRAM(s) Oral two times a day  insulin glargine Injectable (LANTUS) 20 Unit(s) SubCutaneous at bedtime  insulin lispro (HumaLOG) corrective regimen sliding scale   SubCutaneous three times a day before meals  insulin lispro (HumaLOG) corrective regimen sliding scale   SubCutaneous at bedtime  lactulose Syrup 10 Gram(s) Oral every 6 hours  pantoprazole    Tablet 40 milliGRAM(s) Oral before breakfast  phytonadione   Solution 5 milliGRAM(s) Oral daily  rifAXIMin 550 milliGRAM(s) Oral two times a day  sodium chloride 1 Gram(s) Oral three times a day  sucralfate 1 Gram(s) Oral two times a day  tamsulosin 0.4 milliGRAM(s) Oral at bedtime  ursodiol Capsule 300 milliGRAM(s) Oral two times a day    MEDICATIONS  (PRN):  dextrose 40% Gel 15 Gram(s) Oral once PRN Blood Glucose LESS THAN 70 milliGRAM(s)/deciliter  glucagon  Injectable 1 milliGRAM(s) IntraMuscular once PRN Glucose LESS THAN 70 milligrams/deciliter      HOME MEDICATIONS:  Home Medications:  furosemide 80 mg oral tablet: 1 tab(s) orally 2 times a day (26 Nov 2019 15:44)  insulin glargine: 40 unit(s) subcutaneous once a day (26 Nov 2019 15:44)  metOLazone 2.5 mg oral tablet: 1 tab(s) orally every other day    Note: Daughter hasn&#x27;t given since it wasn&#x27;t on rehab discharge. (26 Nov 2019 15:44)  phytonadione: 1 tab(s) orally once a day (26 Nov 2019 15:44)  potassium chloride 10 mEq oral capsule, extended release: 1 cap(s) orally once a day (26 Nov 2019 15:44)  pravastatin 40 mg oral tablet: 1 tab(s) orally 2 times a day    Note:Pharmacy has once a day. (26 Nov 2019 15:44)  Reglan 5 mg oral tablet: 1 tab(s) orally once a day    Note:Pharmacy has 3 times a day. (26 Nov 2019 15:44)  rifAXIMin 550 mg oral tablet: 1 tab(s) orally 2 times a day (26 Nov 2019 15:44)  spironolactone 25 mg oral tablet: 1 tab(s) orally 2 times a day    Note:Pharmacy has directions as once a day. (26 Nov 2019 15:44)  sucralfate 1 g oral tablet: 1 tab(s) orally 2 times a day    Note:Pharmacy has 4 times a day. (26 Nov 2019 15:44)  tamsulosin 0.4 mg oral capsule: 1 cap(s) orally once a day (at bedtime) (26 Nov 2019 15:44)  ursodiol 300 mg oral capsule: 1 cap(s) orally 2 times a day (26 Nov 2019 15:44)  Vitamin D3: 1 tab(s) orally once a day (26 Nov 2019 15:44)      ROS:     REVIEW OF SYSTEMS  General:  No fevers  Eyes:  No reported pain   ENT:  No sore throat   NECK: No stiffness   CV:  No chest pain   Resp:  No shortness of breath  GI:  See HPI  :  No dysuria  Muscle:  No weakness  Neuro:  No tingling  Endocrine:  No polyuria  Heme:  No ecchymosis          VITALS:   T(F): 97.4 (11-27 @ 08:24), Max: 98.1 (11-26 @ 15:30)  HR: 73 (11-27 @ 08:24) (64 - 83)  BP: 98/55 (11-27 @ 08:24) (88/49 - 109/62)  BP(mean): --  RR: 18 (11-27 @ 08:24) (17 - 22)  SpO2: 98% (11-27 @ 08:24) (97% - 100%)    I&O's Summary    26 Nov 2019 07:01  -  27 Nov 2019 07:00  --------------------------------------------------------  IN: 120 mL / OUT: 1000 mL / NET: -880 mL        PHYSICAL EXAM:  Gen: comfortable   EYES: + scleral icterus   LUNG: Clear to auscultation bilaterally; No rales, rhonchi, wheezing, or rubs, + Left sided chest wall bruise   HEART: S1 and S 2heard   ABDOMEN: Soft, +BS, no abd distension , no Abdominal Tenderness, No guarding, No Whitaker Sign   Neuro: AAO x 3, + mild asterix   Ext: no edema     LABS:                        11.1   6.15  )-----------( 62       ( 26 Nov 2019 10:37 )             30.8     PT/INR - ( 27 Nov 2019 07:56 )  INR: 1.97          PTT - ( 26 Nov 2019 10:38 )  PTT:35.2   LIVER FUNCTIONS - ( 27 Nov 2019 06:31 )  Alb: 3.2 g/dL / Pro: 6.7 g/dL / ALK PHOS: 191 U/L / ALT: 24 U/L / AST: 34 U/L / GGT: x           11-27    125<L>  |  79<L>  |  70<H>  ----------------------------<  183<H>  3.9   |  30  |  2.67<H>    Ca    9.9      27 Nov 2019 06:31        Previous EGD:  < from: Upper EUS (05.03.19 @ 08:51) >  Impression:          - No gross lesions in esophagus.                       - No gross lesions in the stomach.                       - One duodenal ulcer with no stigmata of bleeding.                        Biopsied.                    - There was no sign of significant pathology in the                        examined duodenum.                       - A cystic lesion was seen in the pancreatic body.    < end of copied text >        RADIOLOGY:    < from: CT Abdomen and Pelvis No Cont (11.26.19 @ 12:55) >    FINDINGS:    CHEST:     LUNGS AND LARGE AIRWAYS: Patent central airways. No pulmonary nodules.  PLEURA: No pleural effusion.  VESSELS: Atherosclerotic changes of the aorta and coronary arteries.  HEART: Heart size is normal. No pericardial effusion.  MEDIASTINUM AND RONNIE: No lymphadenopathy.  CHEST WALL AND LOWER NECK: A new 1.5 cm rounded high density focus within   the left chest wall (3:22) from prior imaging 10/26/2019 may represent a   subcutaneous hematoma.    ABDOMEN AND PELVIS:    LIVER: Cirrhosis. Limited evaluation for focal lesion given lack of   intravenous contrast.  BILE DUCTS: Normal caliber.  GALLBLADDER: Within normal limits.  SPLEEN: Within normal limits.  PANCREAS: Within normal limits.  ADRENALS: Within normal limits.  KIDNEYS/URETERS: A 4 mm nonobstructing left renal calculus.    BLADDER: Within normal limits.  REPRODUCTIVE ORGANS: Prostate within normal limits.    BOWEL: No bowel obstruction. Appendix is normal.  PERITONEUM: No ascites.  VESSELS: Atherosclerotic changes.Upper abdominal varices.  RETROPERITONEUM/LYMPH NODES: No lymphadenopathy.    ABDOMINAL WALL: Within normal limits.  BONES: Degenerative changes.    IMPRESSION:     A new 1.5 cm rounded high density focus within the left chest wall from   prior imaging 10/26/2019 may represent a subcutaneous hematoma.   Otherwise, no evidence of acute traumatic injury.    Cirrhosis. No ascites.      < end of copied text >

## 2019-11-27 NOTE — DISCHARGE NOTE NURSING/CASE MANAGEMENT/SOCIAL WORK - NSDCVIVACCINE_GEN_ALL_CORE_FT
No Vaccines Administered. Influenza , 2019/12/17 10:15 , Rajinder Rivera (RN)  Pneumococcal Conjugate (PCV 13) , 2019/12/16 18:28 , Jodie Serrano (RN)

## 2019-11-27 NOTE — CONSULT NOTE ADULT - ASSESSMENT
63M w/ pmh HTN, HLD, DM, non-alcoholic cirrhosis, chronic thrombocytopenia -- sent from rehab for worsening jaundice, dizzy/weakness/falls x 4 days.  h/o strep bacteremia 10/19 s/p RX  Now also with elevated Tb and creatinine  No other clinical s/s infectious process  No ascites  UA, Chest/abd/pelvis Ct no infectious process, has hematoma  Would recommend monitor clinically  Check blood Cx  No empiric abx indicated presently-rifaximin per hepatology  Will tailor plan for ID issues  per course,results.Will defer to primary team on management of other issues.  case d/w Med NP  ID service will cover and follow over next 4 days.Please call [phone] if any issues.

## 2019-11-27 NOTE — PROGRESS NOTE ADULT - PROBLEM SELECTOR PLAN 1
possible due to prerenal azotemia , hold lasix   increase water intake , low salt   will f/u with dena gamez   Serum creatinine is 2.55   , approximating GFR is decreased    ml/min.   There is  progression . No uremic symptoms    pos evidence of anemia .  Fluid status stable.  Will continue to avoid nephrotoxic drugs.  Patient remains asymptomatic.   Continue current therapy.  hold diuresis hold arb   catie acei.

## 2019-11-27 NOTE — DISCHARGE NOTE NURSING/CASE MANAGEMENT/SOCIAL WORK - NSDCPEWEB_GEN_ALL_CORE
[facility] for Tobacco Control website --- [email or web address] website --- [email or web address]

## 2019-11-28 LAB
ALBUMIN SERPL ELPH-MCNC: 3 G/DL — LOW (ref 3.3–5)
ALP SERPL-CCNC: 182 U/L — HIGH (ref 40–120)
ALT FLD-CCNC: 23 U/L — SIGNIFICANT CHANGE UP (ref 10–45)
AMMONIA BLD-MCNC: 192 UMOL/L — HIGH (ref 11–55)
ANION GAP SERPL CALC-SCNC: 18 MMOL/L — HIGH (ref 5–17)
ANION GAP SERPL CALC-SCNC: 20 MMOL/L — HIGH (ref 5–17)
APPEARANCE UR: ABNORMAL
APTT BLD: 36.8 SEC — HIGH (ref 27.5–36.3)
AST SERPL-CCNC: 36 U/L — SIGNIFICANT CHANGE UP (ref 10–40)
BASOPHILS # BLD AUTO: 0 K/UL — SIGNIFICANT CHANGE UP (ref 0–0.2)
BASOPHILS NFR BLD AUTO: 0 % — SIGNIFICANT CHANGE UP (ref 0–2)
BILIRUB SERPL-MCNC: 8.1 MG/DL — HIGH (ref 0.2–1.2)
BILIRUB UR-MCNC: NEGATIVE — SIGNIFICANT CHANGE UP
BUN SERPL-MCNC: 70 MG/DL — HIGH (ref 7–23)
BUN SERPL-MCNC: 70 MG/DL — HIGH (ref 7–23)
CALCIUM SERPL-MCNC: 10.4 MG/DL — SIGNIFICANT CHANGE UP (ref 8.4–10.5)
CALCIUM SERPL-MCNC: 10.6 MG/DL — HIGH (ref 8.4–10.5)
CHLORIDE SERPL-SCNC: 82 MMOL/L — LOW (ref 96–108)
CHLORIDE SERPL-SCNC: 85 MMOL/L — LOW (ref 96–108)
CO2 SERPL-SCNC: 26 MMOL/L — SIGNIFICANT CHANGE UP (ref 22–31)
CO2 SERPL-SCNC: 29 MMOL/L — SIGNIFICANT CHANGE UP (ref 22–31)
COLOR SPEC: YELLOW — SIGNIFICANT CHANGE UP
CREAT SERPL-MCNC: 2.59 MG/DL — HIGH (ref 0.5–1.3)
CREAT SERPL-MCNC: 2.7 MG/DL — HIGH (ref 0.5–1.3)
DIFF PNL FLD: ABNORMAL
EOSINOPHIL # BLD AUTO: 0.06 K/UL — SIGNIFICANT CHANGE UP (ref 0–0.5)
EOSINOPHIL NFR BLD AUTO: 0.9 % — SIGNIFICANT CHANGE UP (ref 0–6)
GLUCOSE BLDC GLUCOMTR-MCNC: 151 MG/DL — HIGH (ref 70–99)
GLUCOSE BLDC GLUCOMTR-MCNC: 158 MG/DL — HIGH (ref 70–99)
GLUCOSE BLDC GLUCOMTR-MCNC: 174 MG/DL — HIGH (ref 70–99)
GLUCOSE BLDC GLUCOMTR-MCNC: 181 MG/DL — HIGH (ref 70–99)
GLUCOSE SERPL-MCNC: 147 MG/DL — HIGH (ref 70–99)
GLUCOSE SERPL-MCNC: 212 MG/DL — HIGH (ref 70–99)
GLUCOSE UR QL: NEGATIVE — SIGNIFICANT CHANGE UP
HCT VFR BLD CALC: 29.1 % — LOW (ref 39–50)
HGB BLD-MCNC: 10.2 G/DL — LOW (ref 13–17)
INR BLD: 1.88 RATIO — HIGH (ref 0.88–1.16)
KETONES UR-MCNC: NEGATIVE — SIGNIFICANT CHANGE UP
LEUKOCYTE ESTERASE UR-ACNC: NEGATIVE — SIGNIFICANT CHANGE UP
LYMPHOCYTES # BLD AUTO: 0.87 K/UL — LOW (ref 1–3.3)
LYMPHOCYTES # BLD AUTO: 14 % — SIGNIFICANT CHANGE UP (ref 13–44)
MCHC RBC-ENTMCNC: 33.6 PG — SIGNIFICANT CHANGE UP (ref 27–34)
MCHC RBC-ENTMCNC: 35.1 GM/DL — SIGNIFICANT CHANGE UP (ref 32–36)
MCV RBC AUTO: 95.7 FL — SIGNIFICANT CHANGE UP (ref 80–100)
MONOCYTES # BLD AUTO: 0.38 K/UL — SIGNIFICANT CHANGE UP (ref 0–0.9)
MONOCYTES NFR BLD AUTO: 6.1 % — SIGNIFICANT CHANGE UP (ref 2–14)
NEUTROPHILS # BLD AUTO: 4.88 K/UL — SIGNIFICANT CHANGE UP (ref 1.8–7.4)
NEUTROPHILS NFR BLD AUTO: 78.1 % — HIGH (ref 43–77)
NITRITE UR-MCNC: NEGATIVE — SIGNIFICANT CHANGE UP
PH UR: 6 — SIGNIFICANT CHANGE UP (ref 5–8)
PLATELET # BLD AUTO: 67 K/UL — LOW (ref 150–400)
POTASSIUM SERPL-MCNC: 3.5 MMOL/L — SIGNIFICANT CHANGE UP (ref 3.5–5.3)
POTASSIUM SERPL-MCNC: 3.5 MMOL/L — SIGNIFICANT CHANGE UP (ref 3.5–5.3)
POTASSIUM SERPL-SCNC: 3.5 MMOL/L — SIGNIFICANT CHANGE UP (ref 3.5–5.3)
POTASSIUM SERPL-SCNC: 3.5 MMOL/L — SIGNIFICANT CHANGE UP (ref 3.5–5.3)
PROT SERPL-MCNC: 6.9 G/DL — SIGNIFICANT CHANGE UP (ref 6–8.3)
PROT UR-MCNC: SIGNIFICANT CHANGE UP
PROTHROM AB SERPL-ACNC: 21.8 SEC — HIGH (ref 10–13.1)
RBC # BLD: 3.04 M/UL — LOW (ref 4.2–5.8)
RBC # FLD: 19.5 % — HIGH (ref 10.3–14.5)
SODIUM SERPL-SCNC: 129 MMOL/L — LOW (ref 135–145)
SODIUM SERPL-SCNC: 131 MMOL/L — LOW (ref 135–145)
SP GR SPEC: 1.01 — SIGNIFICANT CHANGE UP (ref 1.01–1.02)
UROBILINOGEN FLD QL: SIGNIFICANT CHANGE UP
WBC # BLD: 6.18 K/UL — SIGNIFICANT CHANGE UP (ref 3.8–10.5)
WBC # FLD AUTO: 6.18 K/UL — SIGNIFICANT CHANGE UP (ref 3.8–10.5)

## 2019-11-28 PROCEDURE — 93010 ELECTROCARDIOGRAM REPORT: CPT

## 2019-11-28 PROCEDURE — 99223 1ST HOSP IP/OBS HIGH 75: CPT

## 2019-11-28 RX ORDER — ALBUMIN HUMAN 25 %
100 VIAL (ML) INTRAVENOUS EVERY 6 HOURS
Refills: 0 | Status: COMPLETED | OUTPATIENT
Start: 2019-11-28 | End: 2019-11-30

## 2019-11-28 RX ORDER — SODIUM CHLORIDE 9 MG/ML
2 INJECTION INTRAMUSCULAR; INTRAVENOUS; SUBCUTANEOUS ONCE
Refills: 0 | Status: COMPLETED | OUTPATIENT
Start: 2019-11-28 | End: 2019-11-28

## 2019-11-28 RX ORDER — LACTULOSE 10 G/15ML
200 SOLUTION ORAL ONCE
Refills: 0 | Status: COMPLETED | OUTPATIENT
Start: 2019-11-28 | End: 2019-11-28

## 2019-11-28 RX ADMIN — SODIUM CHLORIDE 2 GRAM(S): 9 INJECTION INTRAMUSCULAR; INTRAVENOUS; SUBCUTANEOUS at 05:23

## 2019-11-28 RX ADMIN — LACTULOSE 200 GRAM(S): 10 SOLUTION ORAL at 11:11

## 2019-11-28 RX ADMIN — URSODIOL 300 MILLIGRAM(S): 250 TABLET, FILM COATED ORAL at 17:38

## 2019-11-28 RX ADMIN — ATORVASTATIN CALCIUM 10 MILLIGRAM(S): 80 TABLET, FILM COATED ORAL at 21:51

## 2019-11-28 RX ADMIN — SODIUM CHLORIDE 2 GRAM(S): 9 INJECTION INTRAMUSCULAR; INTRAVENOUS; SUBCUTANEOUS at 22:34

## 2019-11-28 RX ADMIN — INSULIN GLARGINE 20 UNIT(S): 100 INJECTION, SOLUTION SUBCUTANEOUS at 21:40

## 2019-11-28 RX ADMIN — Medication 1: at 12:47

## 2019-11-28 RX ADMIN — Medication 1 GRAM(S): at 17:38

## 2019-11-28 RX ADMIN — Medication 1 GRAM(S): at 05:23

## 2019-11-28 RX ADMIN — Medication 5 MILLIGRAM(S): at 12:47

## 2019-11-28 RX ADMIN — PANTOPRAZOLE SODIUM 40 MILLIGRAM(S): 20 TABLET, DELAYED RELEASE ORAL at 06:31

## 2019-11-28 RX ADMIN — LACTULOSE 30 GRAM(S): 10 SOLUTION ORAL at 10:41

## 2019-11-28 RX ADMIN — LACTULOSE 30 GRAM(S): 10 SOLUTION ORAL at 05:21

## 2019-11-28 RX ADMIN — Medication 50 MILLILITER(S): at 17:36

## 2019-11-28 RX ADMIN — LACTULOSE 30 GRAM(S): 10 SOLUTION ORAL at 02:18

## 2019-11-28 RX ADMIN — Medication 50 MILLILITER(S): at 23:26

## 2019-11-28 RX ADMIN — LACTULOSE 30 GRAM(S): 10 SOLUTION ORAL at 17:38

## 2019-11-28 RX ADMIN — SODIUM CHLORIDE 2 GRAM(S): 9 INJECTION INTRAMUSCULAR; INTRAVENOUS; SUBCUTANEOUS at 14:53

## 2019-11-28 RX ADMIN — LACTULOSE 30 GRAM(S): 10 SOLUTION ORAL at 14:53

## 2019-11-28 RX ADMIN — Medication 1: at 08:34

## 2019-11-28 RX ADMIN — TAMSULOSIN HYDROCHLORIDE 0.4 MILLIGRAM(S): 0.4 CAPSULE ORAL at 21:51

## 2019-11-28 RX ADMIN — LACTULOSE 30 GRAM(S): 10 SOLUTION ORAL at 21:40

## 2019-11-28 RX ADMIN — Medication 1: at 17:36

## 2019-11-28 RX ADMIN — URSODIOL 300 MILLIGRAM(S): 250 TABLET, FILM COATED ORAL at 05:23

## 2019-11-28 NOTE — PROGRESS NOTE ADULT - SUBJECTIVE AND OBJECTIVE BOX
SUBJECTIVE / OVERNIGHT EVENTS: pt denies headache, nausea,v   chest pain, shortness of breath   no bm still, + flatus    MEDICATIONS  (STANDING):  albumin human 25% IVPB 100 milliLiter(s) IV Intermittent every 6 hours  atorvastatin 10 milliGRAM(s) Oral at bedtime  dextrose 5%. 1000 milliLiter(s) (50 mL/Hr) IV Continuous <Continuous>  dextrose 50% Injectable 12.5 Gram(s) IV Push once  dextrose 50% Injectable 25 Gram(s) IV Push once  dextrose 50% Injectable 25 Gram(s) IV Push once  insulin glargine Injectable (LANTUS) 20 Unit(s) SubCutaneous at bedtime  insulin lispro (HumaLOG) corrective regimen sliding scale   SubCutaneous three times a day before meals  insulin lispro (HumaLOG) corrective regimen sliding scale   SubCutaneous at bedtime  lactulose Syrup 30 Gram(s) Oral every 4 hours  pantoprazole    Tablet 40 milliGRAM(s) Oral before breakfast  phytonadione   Solution 5 milliGRAM(s) Oral daily  rifAXIMin 550 milliGRAM(s) Oral two times a day  sucralfate 1 Gram(s) Oral two times a day  tamsulosin 0.4 milliGRAM(s) Oral at bedtime  ursodiol Capsule 300 milliGRAM(s) Oral two times a day    MEDICATIONS  (PRN):  dextrose 40% Gel 15 Gram(s) Oral once PRN Blood Glucose LESS THAN 70 milliGRAM(s)/deciliter  glucagon  Injectable 1 milliGRAM(s) IntraMuscular once PRN Glucose LESS THAN 70 milligrams/deciliter    Vital Signs Last 24 Hrs  T(C): 36.3 (2019 14:19), Max: 36.5 (2019 19:44)  T(F): 97.4 (2019 14:19), Max: 97.7 (2019 19:44)  HR: 77 (2019 14:19) (69 - 77)  BP: 111/65 (2019 14:19) (93/53 - 111/65)  BP(mean): --  RR: 18 (2019 14:19) (18 - 18)  SpO2: 100% (2019 14:19) (96% - 100%)      Constitutional: No fever, fatigue  Skin: No rash.  Eyes: No recent vision problems or eye pain.  ENT: No congestion, ear pain, or sore throat.  Cardiovascular: No chest pain or palpation.  Respiratory: No cough, shortness of breath, congestion, or wheezing.  Gastrointestinal: No abdominal pain, nausea, vomiting, or diarrhea.  Genitourinary: No dysuria.  Musculoskeletal: No joint swelling.  Neurologic: No headache.    PHYSICAL EXAM:  GENERAL: NAD  EYES: EOMI, PERRLA  NECK: Supple, No JVD  CHEST/LUNG: dec breath sounds at bases   HEART:  S1 , S2 +  ABDOMEN: soft , bs+, mild distension + , no tenderness  EXTREMITIES:  no edema  NEUROLOGY:alert awake oriented   flap tremor+    LABS:      131<L>  |  85<L>  |  70<H>  ----------------------------<  212<H>  3.5   |  26  |  2.59<H>    Ca    10.6<H>      2019 18:20    TPro  6.9  /  Alb  3.0<L>  /  TBili  8.1<H>  /  DBili      /  AST  36  /  ALT  23  /  AlkPhos  182<H>      Creatinine Trend: 2.59 <--, 2.70 <--, 2.68 <--, 2.63 <--, 2.67 <--, 2.61 <--, 2.55 <--                        10.2   6.18  )-----------( 67       ( 2019 08:52 )             29.1     Urine Studies:  Urinalysis Basic - ( 2019 08:42 )    Color: Yellow / Appearance: Slightly Turbid / S.013 / pH:   Gluc:  / Ketone: Negative  / Bili: Negative / Urobili: <2 mg/dL   Blood:  / Protein: Trace / Nitrite: Negative   Leuk Esterase: Negative / RBC: 100 /HPF / WBC 4 /HPF   Sq Epi:  / Non Sq Epi: 0 /HPF / Bacteria: Negative      Osmolality, Random Urine: 335 mosm/Kg ( @ 00:20)  Creatinine, Random Urine: 67 mg/dL ( @ 23:21)  Creatinine, Random Urine: 68 mg/dL ( @ 21:19)  Protein/Creatinine Ratio Calculation: 0.2 Ratio ( @ 21:19)  Potassium, Random Urine: 37 mmol/L ( @ 21:19)  Sodium, Random Urine: 61 mmol/L ( @ 21:19)          LIVER FUNCTIONS - ( 2019 06:29 )  Alb: 3.0 g/dL / Pro: 6.9 g/dL / ALK PHOS: 182 U/L / ALT: 23 U/L / AST: 36 U/L / GGT: x           PT/INR - ( 2019 08:38 )   PT: 21.8 sec;   INR: 1.88 ratio         PTT - ( 2019 08:38 )  PTT:36.8 sec  Blood: x / Protein: Negative / Nitrite: Negative   Leuk Esterase: Negative / RBC: 14 /HPF / WBC 4 /HPF   Sq Epi: x / Non Sq Epi: 0 /HPF / Bacteria: Negative        RADIOLOGY & ADDITIONAL TESTS:    Imaging Personally Reviewed:    Consultant(s) Notes Reviewed:      Care Discussed with Consultants/Other Providers:

## 2019-11-28 NOTE — CONSULT NOTE ADULT - SUBJECTIVE AND OBJECTIVE BOX
Chief Complaint:  confusion     HPI:  63 M w/ pmh HTN, HLD, DM, non-alcoholic cirrhosis, chronic thrombocytopenia -- sent from rehab for worsening jaundice, dizzy/weakness/falls x 4 days. Discharged to rehab after last hospitalization. No fever, n/v/d/c, chest / abd pain, cough, sob, dysuria/hematuria. Recently admitted at [facility] for sepsis, hypoglycemia, cirrhosis (lactulose, xifaxan), hepatic encephalopathy, GI bleed; h/o strep bacteremia in 10/19 s/p - 2 week course of Ceftriaxone completed, Repeat blood cultures on 10/23 were negative.  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  Patient is unable to provide history due to his current confusion.     Allergies:  codeine (Anaphylaxis)  NO  RED MEAT (Unknown)    Home Medications:   * Patient Currently Takes Medications as of 2019 15:44 documented in Structured Notes  · 	rifAXIMin 550 mg oral tablet: Last Dose Taken:  , 1 tab(s) orally 2 times a day  · 	pantoprazole 40 mg oral delayed release tablet: Last Dose Taken:  , 1 tab(s) orally 2 times a day   · 	tamsulosin 0.4 mg oral capsule: Last Dose Taken:  , 1 cap(s) orally once a day (at bedtime)  · 	furosemide 80 mg oral tablet: Last Dose Taken:  , 1 tab(s) orally 2 times a day  · 	lactulose 10 g/15 mL oral syrup: Last Dose Taken:  , 45 milliliter(s) orally every 6 hours   · 	insulin glargine: Last Dose Taken:  , 40 unit(s) subcutaneous once a day  · 	metOLazone 2.5 mg oral tablet: Last Dose Taken:  , 1 tab(s) orally every other day  	  	Note: Daughter hasn't given since it wasn't on rehab discharge.  · 	potassium chloride 10 mEq oral capsule, extended release: Last Dose Taken:  , 1 cap(s) orally once a day  · 	pravastatin 40 mg oral tablet: Last Dose Taken:  , 1 tab(s) orally 2 times a day  	  	Note:Pharmacy has once a day.  · 	spironolactone 25 mg oral tablet: Last Dose Taken:  , 1 tab(s) orally 2 times a day  	  	Note:Pharmacy has directions as once a day.  · 	sucralfate 1 g oral tablet: Last Dose Taken:  , 1 tab(s) orally 2 times a day  	  	Note:Pharmacy has 4 times a day.  · 	ursodiol 300 mg oral capsule: Last Dose Taken:  , 1 cap(s) orally 2 times a day  · 	Vitamin D3: Last Dose Taken:  , 1 tab(s) orally once a day  · 	Reglan 5 mg oral tablet: Last Dose Taken:  , 1 tab(s) orally once a day  	  	Note:Pharmacy has 3 times a day.  · 	phytonadione: Last Dose Taken:  , 1 tab(s) orally once a day    Hospital Medications:  albumin human 25% IVPB 100 milliLiter(s) IV Intermittent every 6 hours  atorvastatin 10 milliGRAM(s) Oral at bedtime  dextrose 40% Gel 15 Gram(s) Oral once PRN  dextrose 5%. 1000 milliLiter(s) IV Continuous <Continuous>  dextrose 50% Injectable 12.5 Gram(s) IV Push once  dextrose 50% Injectable 25 Gram(s) IV Push once  dextrose 50% Injectable 25 Gram(s) IV Push once  glucagon  Injectable 1 milliGRAM(s) IntraMuscular once PRN  insulin glargine Injectable (LANTUS) 20 Unit(s) SubCutaneous at bedtime  insulin lispro (HumaLOG) corrective regimen sliding scale   SubCutaneous three times a day before meals  insulin lispro (HumaLOG) corrective regimen sliding scale   SubCutaneous at bedtime  lactulose Syrup 30 Gram(s) Oral every 4 hours  pantoprazole    Tablet 40 milliGRAM(s) Oral before breakfast  phytonadione   Solution 5 milliGRAM(s) Oral daily  rifAXIMin 550 milliGRAM(s) Oral two times a day  sodium chloride 2 Gram(s) Oral three times a day  sucralfate 1 Gram(s) Oral two times a day  tamsulosin 0.4 milliGRAM(s) Oral at bedtime  ursodiol Capsule 300 milliGRAM(s) Oral two times a day      PMHX/PSHX:  GIB (gastrointestinal bleeding)  GERD with esophagitis  Hepatic encephalopathy  Obesity  Fatty liver disease, nonalcoholic  Renal stones  Hypertension  Neuropathy  Hypercholesteremia  Diabetes  S/P cholecystectomy        Family history:  Family history of type 2 diabetes mellitus  Family history of hypertension  Family history of stomach cancer        Social History: no smoking    ROS: Unable to be obtained due to confusion       PHYSICAL EXAM:   GENERAL:  confused, not following commands   HEENT:  NC/AT,  conjunctivae clear and pink, sclera -icteric  CHEST:  CTA B/L, Normal effort  HEART:  RRR S1/S2, No murmurs  ABDOMEN:  Soft, non-tender, non-distended, normoactive bowel sounds,  no masses   EXTREMITIES:  No cyanosis or Edema  SKIN:  Warm & Dry. No rash or erythema  NEURO:  Alert, oriented times 1 ( his name)    Vital Signs:  Vital Signs Last 24 Hrs  T(C): 36.3 (2019 14:19), Max: 36.5 (2019 19:44)  T(F): 97.4 (2019 14:19), Max: 97.7 (2019 19:44)  HR: 77 (2019 14:19) (69 - 77)  BP: 111/65 (2019 14:19) (93/53 - 111/65)  BP(mean): --  RR: 18 (2019 14:19) (18 - 18)  SpO2: 100% (2019 14:19) (96% - 100%)  Daily     Daily     LABS:                        10.2   6.18  )-----------( 67       ( 2019 08:52 )             29.1     Mean Cell Volume: 95.7 fl (19 @ 08:52)        129<L>  |  82<L>  |  70<H>  ----------------------------<  147<H>  3.5   |  29  |  2.70<H>    Ca    10.4      2019 06:29    TPro  6.9  /  Alb  3.0<L>  /  TBili  8.1<H>  /  DBili  x   /  AST  36  /  ALT  23  /  AlkPhos  182<H>      LIVER FUNCTIONS - ( 2019 06:29 )  Alb: 3.0 g/dL / Pro: 6.9 g/dL / ALK PHOS: 182 U/L / ALT: 23 U/L / AST: 36 U/L / GGT: x           PT/INR - ( 2019 08:38 )   PT: 21.8 sec;   INR: 1.88 ratio         PTT - ( 2019 08:38 )  PTT:36.8 sec  Urinalysis Basic - ( 2019 08:42 )    Color: Yellow / Appearance: Slightly Turbid / S.013 / pH: x  Gluc: x / Ketone: Negative  / Bili: Negative / Urobili: <2 mg/dL   Blood: x / Protein: Trace / Nitrite: Negative   Leuk Esterase: Negative / RBC: 100 /HPF / WBC 4 /HPF   Sq Epi: x / Non Sq Epi: 0 /HPF / Bacteria: Negative      Amylase Serum--      Lipase serum--       [id number]                          10.2   6.18  )-----------( 67       ( 2019 08:52 )             29.1                         10.1   6.54  )-----------( 74       ( 2019 18:42 )             27.4                         9.9    6.78  )-----------( 71       ( 2019 08:17 )             28.3                         11.1   6.15  )-----------( 62       ( 2019 10:37 )             30.8     Imaging:    < from: CT Abdomen and Pelvis No Cont (11.26.19 @ 12:55) >  EXAM:  CT ABDOMEN AND PELVIS                          EXAM:  CT CHEST                            PROCEDURE DATE:  2019            INTERPRETATION:  CLINICAL INFORMATION: Cirrhosis. Recent fall. Assess for   traumatic injury.    COMPARISON: CT chest 10/26/2019, CT abdomen pelvis 10/23/2019    PROCEDURE:   CT of the Chest, Abdomen and Pelvis was performed without intravenous   contrast.   Intravenous contrast: None.  Oral contrast: None.  Sagittal and coronal reformats were performed.    FINDINGS:    CHEST:     LUNGS AND LARGE AIRWAYS: Patent central airways. No pulmonary nodules.  PLEURA: No pleural effusion.  VESSELS: Atherosclerotic changes of the aorta and coronary arteries.  HEART: Heart size is normal. No pericardial effusion.  MEDIASTINUM AND RONNIE: No lymphadenopathy.  CHEST WALL AND LOWER NECK: A new 1.5 cm rounded high density focus within   the left chest wall (3:22) from prior imaging 10/26/2019 may represent a   subcutaneous hematoma.    ABDOMEN AND PELVIS:    LIVER: Cirrhosis. Limited evaluation for focal lesion given lack of   intravenous contrast.  BILE DUCTS: Normal caliber.  GALLBLADDER: Within normal limits.  SPLEEN: Within normal limits.  PANCREAS: Within normal limits.  ADRENALS: Within normal limits.  KIDNEYS/URETERS: A 4 mm nonobstructing left renal calculus.    BLADDER: Within normal limits.  REPRODUCTIVE ORGANS: Prostate within normal limits.    BOWEL: No bowel obstruction. Appendix is normal.  PERITONEUM: No ascites.  VESSELS: Atherosclerotic changes.Upper abdominal varices.  RETROPERITONEUM/LYMPH NODES: No lymphadenopathy.    ABDOMINAL WALL: Within normal limits.  BONES: Degenerative changes.    IMPRESSION:     A new 1.5 cm rounded high density focus within the left chest wall from   prior imaging 10/26/2019 may represent a subcutaneous hematoma.   Otherwise, no evidence of acute traumatic injury.    Cirrhosis. No ascites.      < end of copied text >    < from: US Abdomen Complete (19 @ 11:31) >  EXAM:  US ABDOMEN COMPLETE                            PROCEDURE DATE:  2019            INTERPRETATION:  CLINICAL INFORMATION: Right upper quadrant pain with   worsening jaundice. Evaluate for obstruction.. Creatinine is 2.55. Status   post fall.    COMPARISON: CT performed on 2019. Renal sonography 10/26/2019.    TECHNIQUE: Sonography of the abdomen.     FINDINGS:    Limited evaluation due to poor acoustic window.    Liver: Right lobe measures 13 cm in length. Coarsened echotexture.  Nodular contour consistent with hepatic cirrhosis.    Bile ducts: Normal caliber. Common bile duct measures 4 mm.     Gallbladder: Status post cholecystectomy.        Pancreas: Limited    Spleen: 13.2 cm. Within normal limits.    Right kidney: 13.3cm. No hydronephrosis.    Left kidney: 13.7 cm.  No hydronephrosis. Nonobstructing renal stone in   the lower pole measuring 0.8 x 0.8 cm.    Ascites: Trace perihepatic ascites.    Aorta and IVC: Size abdominal aorta reveals mild atherosclerosis.   Visualized inferior vena cava is limited.    IMPRESSION:     Status post cholecystectomy. No intra or extrahepatic biliary ductal   dilatation.    Hepatic cirrhosis.     Trace perihepatic ascites.    < end of copied text > Chief Complaint:  confusion     HPI:  63 M w/ pmh HTN, HLD, DM, non-alcoholic cirrhosis, chronic thrombocytopenia -- sent from rehab for worsening jaundice, dizzy/weakness/falls x 4 days. Discharged to rehab after last hospitalization. No fever, n/v/d/c, chest / abd pain, cough, sob, dysuria/hematuria. Recently admitted at [facility] for sepsis, hypoglycemia, cirrhosis (lactulose, xifaxan), hepatic encephalopathy, GI bleed; h/o strep bacteremia in 10/19 s/p - 2 week course of Ceftriaxone completed, Repeat blood cultures on 10/23 were negative.  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  Patient is unable to provide history due to his current confusion.     Allergies:  codeine (Anaphylaxis)  NO  RED MEAT (Unknown)    Home Medications:   * Patient Currently Takes Medications as of 2019 15:44 documented in Structured Notes  · 	rifAXIMin 550 mg oral tablet: Last Dose Taken:  , 1 tab(s) orally 2 times a day  · 	pantoprazole 40 mg oral delayed release tablet: Last Dose Taken:  , 1 tab(s) orally 2 times a day   · 	tamsulosin 0.4 mg oral capsule: Last Dose Taken:  , 1 cap(s) orally once a day (at bedtime)  · 	furosemide 80 mg oral tablet: Last Dose Taken:  , 1 tab(s) orally 2 times a day  · 	lactulose 10 g/15 mL oral syrup: Last Dose Taken:  , 45 milliliter(s) orally every 6 hours   · 	insulin glargine: Last Dose Taken:  , 40 unit(s) subcutaneous once a day  · 	metOLazone 2.5 mg oral tablet: Last Dose Taken:  , 1 tab(s) orally every other day  	  	Note: Daughter hasn't given since it wasn't on rehab discharge.  · 	potassium chloride 10 mEq oral capsule, extended release: Last Dose Taken:  , 1 cap(s) orally once a day  · 	pravastatin 40 mg oral tablet: Last Dose Taken:  , 1 tab(s) orally 2 times a day  	  	Note:Pharmacy has once a day.  · 	spironolactone 25 mg oral tablet: Last Dose Taken:  , 1 tab(s) orally 2 times a day  	  	Note:Pharmacy has directions as once a day.  · 	sucralfate 1 g oral tablet: Last Dose Taken:  , 1 tab(s) orally 2 times a day  	  	Note:Pharmacy has 4 times a day.  · 	ursodiol 300 mg oral capsule: Last Dose Taken:  , 1 cap(s) orally 2 times a day  · 	Vitamin D3: Last Dose Taken:  , 1 tab(s) orally once a day  · 	Reglan 5 mg oral tablet: Last Dose Taken:  , 1 tab(s) orally once a day  	  	Note:Pharmacy has 3 times a day.  · 	phytonadione: Last Dose Taken:  , 1 tab(s) orally once a day    Hospital Medications:  albumin human 25% IVPB 100 milliLiter(s) IV Intermittent every 6 hours  atorvastatin 10 milliGRAM(s) Oral at bedtime  dextrose 40% Gel 15 Gram(s) Oral once PRN  dextrose 5%. 1000 milliLiter(s) IV Continuous <Continuous>  dextrose 50% Injectable 12.5 Gram(s) IV Push once  dextrose 50% Injectable 25 Gram(s) IV Push once  dextrose 50% Injectable 25 Gram(s) IV Push once  glucagon  Injectable 1 milliGRAM(s) IntraMuscular once PRN  insulin glargine Injectable (LANTUS) 20 Unit(s) SubCutaneous at bedtime  insulin lispro (HumaLOG) corrective regimen sliding scale   SubCutaneous three times a day before meals  insulin lispro (HumaLOG) corrective regimen sliding scale   SubCutaneous at bedtime  lactulose Syrup 30 Gram(s) Oral every 4 hours  pantoprazole    Tablet 40 milliGRAM(s) Oral before breakfast  phytonadione   Solution 5 milliGRAM(s) Oral daily  rifAXIMin 550 milliGRAM(s) Oral two times a day  sodium chloride 2 Gram(s) Oral three times a day  sucralfate 1 Gram(s) Oral two times a day  tamsulosin 0.4 milliGRAM(s) Oral at bedtime  ursodiol Capsule 300 milliGRAM(s) Oral two times a day      PMHX/PSHX:  GIB (gastrointestinal bleeding)  GERD with esophagitis  Hepatic encephalopathy  Obesity  Fatty liver disease, nonalcoholic  Renal stones  Hypertension  Neuropathy  Hypercholesteremia  Diabetes  S/P cholecystectomy        Family history:  Family history of type 2 diabetes mellitus  Family history of hypertension  Family history of stomach cancer        Social History: no smoking    ROS: Unable to be obtained due to confusion       PHYSICAL EXAM:   GENERAL:  confused, not following commands   HEENT:  NC/AT,  conjunctivae clear and pink, sclera -icteric  CHEST:  CTA B/L, Normal effort  HEART:  RRR S1/S2,  ABDOMEN:  Soft, non-tender, non-distended, normoactive bowel sounds,  no masses   EXTREMITIES:  No cyanosis or Edema  SKIN: Jaundice   NEURO:  Alert, oriented times 1 ( his name)    Vital Signs:  Vital Signs Last 24 Hrs  T(C): 36.3 (2019 14:19), Max: 36.5 (2019 19:44)  T(F): 97.4 (2019 14:19), Max: 97.7 (2019 19:44)  HR: 77 (2019 14:19) (69 - 77)  BP: 111/65 (2019 14:19) (93/53 - 111/65)  BP(mean): --  RR: 18 (2019 14:19) (18 - 18)  SpO2: 100% (2019 14:19) (96% - 100%)  Daily     Daily     LABS:                        10.2   6.18  )-----------( 67       ( 2019 08:52 )             29.1     Mean Cell Volume: 95.7 fl (- @ 08:52)        129<L>  |  82<L>  |  70<H>  ----------------------------<  147<H>  3.5   |  29  |  2.70<H>    Ca    10.4      2019 06:29    TPro  6.9  /  Alb  3.0<L>  /  TBili  8.1<H>  /  DBili  x   /  AST  36  /  ALT  23  /  AlkPhos  182<H>      LIVER FUNCTIONS - ( 2019 06:29 )  Alb: 3.0 g/dL / Pro: 6.9 g/dL / ALK PHOS: 182 U/L / ALT: 23 U/L / AST: 36 U/L / GGT: x           PT/INR - ( 2019 08:38 )   PT: 21.8 sec;   INR: 1.88 ratio         PTT - ( 2019 08:38 )  PTT:36.8 sec  Urinalysis Basic - ( 2019 08:42 )    Color: Yellow / Appearance: Slightly Turbid / S.013 / pH: x  Gluc: x / Ketone: Negative  / Bili: Negative / Urobili: <2 mg/dL   Blood: x / Protein: Trace / Nitrite: Negative   Leuk Esterase: Negative / RBC: 100 /HPF / WBC 4 /HPF   Sq Epi: x / Non Sq Epi: 0 /HPF / Bacteria: Negative      Amylase Serum--      Lipase serum--       [id number]                          10.2   6.18  )-----------( 67       ( 2019 08:52 )             29.1                         10.1   6.54  )-----------( 74       ( 2019 18:42 )             27.4                         9.9    6.78  )-----------( 71       ( 2019 08:17 )             28.3                         11.1   6.15  )-----------( 62       ( 2019 10:37 )             30.8     Imaging:    < from: CT Abdomen and Pelvis No Cont (19 @ 12:55) >  EXAM:  CT ABDOMEN AND PELVIS                          EXAM:  CT CHEST                            PROCEDURE DATE:  2019            INTERPRETATION:  CLINICAL INFORMATION: Cirrhosis. Recent fall. Assess for   traumatic injury.    COMPARISON: CT chest 10/26/2019, CT abdomen pelvis 10/23/2019    PROCEDURE:   CT of the Chest, Abdomen and Pelvis was performed without intravenous   contrast.   Intravenous contrast: None.  Oral contrast: None.  Sagittal and coronal reformats were performed.    FINDINGS:    CHEST:     LUNGS AND LARGE AIRWAYS: Patent central airways. No pulmonary nodules.  PLEURA: No pleural effusion.  VESSELS: Atherosclerotic changes of the aorta and coronary arteries.  HEART: Heart size is normal. No pericardial effusion.  MEDIASTINUM AND RONNIE: No lymphadenopathy.  CHEST WALL AND LOWER NECK: A new 1.5 cm rounded high density focus within   the left chest wall (3:22) from prior imaging 10/26/2019 may represent a   subcutaneous hematoma.    ABDOMEN AND PELVIS:    LIVER: Cirrhosis. Limited evaluation for focal lesion given lack of   intravenous contrast.  BILE DUCTS: Normal caliber.  GALLBLADDER: Within normal limits.  SPLEEN: Within normal limits.  PANCREAS: Within normal limits.  ADRENALS: Within normal limits.  KIDNEYS/URETERS: A 4 mm nonobstructing left renal calculus.    BLADDER: Within normal limits.  REPRODUCTIVE ORGANS: Prostate within normal limits.    BOWEL: No bowel obstruction. Appendix is normal.  PERITONEUM: No ascites.  VESSELS: Atherosclerotic changes.Upper abdominal varices.  RETROPERITONEUM/LYMPH NODES: No lymphadenopathy.    ABDOMINAL WALL: Within normal limits.  BONES: Degenerative changes.    IMPRESSION:     A new 1.5 cm rounded high density focus within the left chest wall from   prior imaging 10/26/2019 may represent a subcutaneous hematoma.   Otherwise, no evidence of acute traumatic injury.    Cirrhosis. No ascites.      < end of copied text >    < from: US Abdomen Complete (.19 @ 11:31) >  EXAM:  US ABDOMEN COMPLETE                            PROCEDURE DATE:  2019            INTERPRETATION:  CLINICAL INFORMATION: Right upper quadrant pain with   worsening jaundice. Evaluate for obstruction.. Creatinine is 2.55. Status   post fall.    COMPARISON: CT performed on 2019. Renal sonography 10/26/2019.    TECHNIQUE: Sonography of the abdomen.     FINDINGS:    Limited evaluation due to poor acoustic window.    Liver: Right lobe measures 13 cm in length. Coarsened echotexture.  Nodular contour consistent with hepatic cirrhosis.    Bile ducts: Normal caliber. Common bile duct measures 4 mm.     Gallbladder: Status post cholecystectomy.        Pancreas: Limited    Spleen: 13.2 cm. Within normal limits.    Right kidney: 13.3cm. No hydronephrosis.    Left kidney: 13.7 cm.  No hydronephrosis. Nonobstructing renal stone in   the lower pole measuring 0.8 x 0.8 cm.    Ascites: Trace perihepatic ascites.    Aorta and IVC: Size abdominal aorta reveals mild atherosclerosis.   Visualized inferior vena cava is limited.    IMPRESSION:     Status post cholecystectomy. No intra or extrahepatic biliary ductal   dilatation.    Hepatic cirrhosis.     Trace perihepatic ascites.    < end of copied text >

## 2019-11-28 NOTE — PROGRESS NOTE ADULT - SUBJECTIVE AND OBJECTIVE BOX
Patient is a 63y Male whom presented to the hospital with ckd and dena     PAST MEDICAL & SURGICAL HISTORY:  GIB (gastrointestinal bleeding)  GERD with esophagitis: Gastritis &amp; Non Bleeding Ulcers  Hepatic encephalopathy  Obesity  Fatty liver disease, nonalcoholic  Renal stones: 25 years ago  Hypertension  Neuropathy  Hypercholesteremia  Diabetes  S/P cholecystectomy      MEDICATIONS  (STANDING):  dextrose 5%. 1000 milliLiter(s) (50 mL/Hr) IV Continuous <Continuous>  dextrose 50% Injectable 12.5 Gram(s) IV Push once  dextrose 50% Injectable 25 Gram(s) IV Push once  dextrose 50% Injectable 25 Gram(s) IV Push once  insulin lispro (HumaLOG) corrective regimen sliding scale   SubCutaneous three times a day before meals  insulin lispro (HumaLOG) corrective regimen sliding scale   SubCutaneous at bedtime      Allergies    codeine (Anaphylaxis)    Intolerances    NO  RED MEAT (Unknown)      SOCIAL HISTORY:  Denies ETOh,Smoking,     FAMILY HISTORY:  Family history of type 2 diabetes mellitus  Family history of hypertension  Family history of stomach cancer      REVIEW OF SYSTEMS:    CONSTITUTIONAL: No weakness, fevers or chills  EYES/ENT: No visual changes;  no throat pain   NECK: No pain or stiffness  RESPIRATORY: No cough, wheezing, hemoptysis; No shortness of breath  CARDIOVASCULAR: No chest pain or palpitations  GASTROINTESTINAL: No abdominal or epigastric pain. No nausea, vomiting,     No diarrhea or constipation. No melena   GENITOURINARY: No dysuria, frequency or hematuria  NEUROLOGICAL: No numbness or weakness  SKIN: dry                                       10.2   6.18  )-----------( 67       ( 2019 08:52 )             29.1       CBC Full  -  ( 2019 08:52 )  WBC Count : 6.18 K/uL  RBC Count : 3.04 M/uL  Hemoglobin : 10.2 g/dL  Hematocrit : 29.1 %  Platelet Count - Automated : 67 K/uL  Mean Cell Volume : 95.7 fl  Mean Cell Hemoglobin : 33.6 pg  Mean Cell Hemoglobin Concentration : 35.1 gm/dL  Auto Neutrophil # : 4.88 K/uL  Auto Lymphocyte # : 0.87 K/uL  Auto Monocyte # : 0.38 K/uL  Auto Eosinophil # : 0.06 K/uL  Auto Basophil # : 0.00 K/uL  Auto Neutrophil % : 78.1 %  Auto Lymphocyte % : 14.0 %  Auto Monocyte % : 6.1 %  Auto Eosinophil % : 0.9 %  Auto Basophil % : 0.0 %          131<L>  |  85<L>  |  70<H>  ----------------------------<  212<H>  3.5   |  26  |  2.59<H>    Ca    10.6<H>      2019 18:20    TPro  6.9  /  Alb  3.0<L>  /  TBili  8.1<H>  /  DBili  x   /  AST  36  /  ALT  23  /  AlkPhos  182<H>        CAPILLARY BLOOD GLUCOSE      POCT Blood Glucose.: 174 mg/dL (2019 21:35)  POCT Blood Glucose.: 181 mg/dL (2019 17:24)  POCT Blood Glucose.: 158 mg/dL (2019 12:24)  POCT Blood Glucose.: 151 mg/dL (2019 08:26)      Vital Signs Last 24 Hrs  T(C): 36.2 (2019 20:49), Max: 36.4 (2019 03:54)  T(F): 97.2 (2019 20:49), Max: 97.5 (2019 03:54)  HR: 85 (2019 20:49) (69 - 85)  BP: 95/55 (2019 20:49) (95/55 - 111/65)  BP(mean): --  RR: 18 (2019 20:49) (18 - 18)  SpO2: 97% (2019 20:49) (96% - 100%)    Urinalysis Basic - ( 2019 08:42 )    Color: Yellow / Appearance: Slightly Turbid / S.013 / pH: x  Gluc: x / Ketone: Negative  / Bili: Negative / Urobili: <2 mg/dL   Blood: x / Protein: Trace / Nitrite: Negative   Leuk Esterase: Negative / RBC: 100 /HPF / WBC 4 /HPF   Sq Epi: x / Non Sq Epi: 0 /HPF / Bacteria: Negative        PT/INR - ( 2019 08:38 )   PT: 21.8 sec;   INR: 1.88 ratio         PTT - ( 2019 08:38 )  PTT:36.8 sec    PHYSICAL EXAM:    Constitutional: NAD  HEENT: conjunctive   clear   Neck:  No JVD  Respiratory: CTAB  Cardiovascular: S1 and S2  Gastrointestinal: BS+, soft, NT/ND  Extremities: No peripheral edema  Neurological: A/O x 3, no focal deficits  Psychiatric: Normal mood, normal affect  : No Beasley  Skin: No rashes  Access: Not applicable

## 2019-11-28 NOTE — CONSULT NOTE ADULT - SUBJECTIVE AND OBJECTIVE BOX
[facility] - Sushila Dhaliwal, Doug, Morenita, Ayo, Colleen Williamson  Office Number: [phone]    Initial Consult Note    CHIEF COMPLAINT: Patient is a 63y old  Male who presents with a chief complaint of difficulty to ambulate/ weakness (27 Nov 2019 16:32)      HPI:  63M w/ pmh HTN, HLD, DM, non-alcoholic cirrhosis, chronic thrombocytopenia -- sent from rehab for worsening jaundice, dizzy/weakness/falls x 4 days. Discharged to rehab after last hospitalization. No fever, n/v/d/c, chest / abd pain, cough, sob, dysuria/hematuria. recently admitted at OSH for sepsis, hypoglycemia, cirrhosis (lactulose, xifaxan), hepatic encephalopathy, GI bleed; G+ bacteremia, EGD, last bm yesterday morning (26 Nov 2019 17:37)    Patient is confused and unable to obtain interval history.  We last saw him during a hospitalization in 10/2019, during which he was significantly volume overloaded and required iv diuresis.  He was eventually discharged on lasix 20 and aldactone 25.  Echo with grossly normal LV function at this time.      PAST MEDICAL & SURGICAL HISTORY:  GIB (gastrointestinal bleeding)  GERD with esophagitis: Gastritis &amp; Non Bleeding Ulcers  Hepatic encephalopathy  Obesity  Fatty liver disease, nonalcoholic  Renal stones: 25 years ago  Hypertension  Neuropathy  Hypercholesteremia  Diabetes  S/P cholecystectomy      SOCIAL HISTORY:  No tobacco, ethanol, or drug abuse.    FAMILY HISTORY:  Family history of type 2 diabetes mellitus  Family history of hypertension  Family history of stomach cancer    No family history of acute MI or sudden cardiac death.    MEDICATIONS  (STANDING):  atorvastatin 10 milliGRAM(s) Oral at bedtime  dextrose 5%. 1000 milliLiter(s) (50 mL/Hr) IV Continuous <Continuous>  dextrose 50% Injectable 12.5 Gram(s) IV Push once  dextrose 50% Injectable 25 Gram(s) IV Push once  dextrose 50% Injectable 25 Gram(s) IV Push once  insulin glargine Injectable (LANTUS) 20 Unit(s) SubCutaneous at bedtime  insulin lispro (HumaLOG) corrective regimen sliding scale   SubCutaneous three times a day before meals  insulin lispro (HumaLOG) corrective regimen sliding scale   SubCutaneous at bedtime  lactulose Syrup 30 Gram(s) Oral every 4 hours  pantoprazole    Tablet 40 milliGRAM(s) Oral before breakfast  phytonadione   Solution 5 milliGRAM(s) Oral daily  rifAXIMin 550 milliGRAM(s) Oral two times a day  sodium chloride 2 Gram(s) Oral three times a day  sucralfate 1 Gram(s) Oral two times a day  tamsulosin 0.4 milliGRAM(s) Oral at bedtime  ursodiol Capsule 300 milliGRAM(s) Oral two times a day    MEDICATIONS  (PRN):  dextrose 40% Gel 15 Gram(s) Oral once PRN Blood Glucose LESS THAN 70 milliGRAM(s)/deciliter  glucagon  Injectable 1 milliGRAM(s) IntraMuscular once PRN Glucose LESS THAN 70 milligrams/deciliter      Allergies    codeine (Anaphylaxis)    Intolerances    NO  RED MEAT (Unknown)      REVIEW OF SYSTEMS:    unable to assess in the setting of his mental status    All other review of systems is negative unless indicated above    VITAL SIGNS:   Vital Signs Last 24 Hrs  T(C): 36.4 (28 Nov 2019 03:54), Max: 36.5 (27 Nov 2019 19:44)  T(F): 97.5 (28 Nov 2019 03:54), Max: 97.7 (27 Nov 2019 19:44)  HR: 69 (28 Nov 2019 03:54) (69 - 73)  BP: 99/52 (28 Nov 2019 03:54) (93/53 - 99/52)  BP(mean): --  RR: 18 (28 Nov 2019 03:54) (18 - 18)  SpO2: 96% (28 Nov 2019 03:54) (96% - 98%)    I&O's Summary    27 Nov 2019 07:01  -  28 Nov 2019 07:00  --------------------------------------------------------  IN: 900 mL / OUT: 2100 mL / NET: -1200 mL        On Exam:    Constitutional: NAD, confused, not wearing gown  Lungs:  Non-labored, breath sounds are clear bilaterally, No wheezing, rales or rhonchi  Cardiovascular: RRR.  S1 and S2 positive.  No murmurs, rubs, gallops or clicks  Gastrointestinal: Bowel Sounds present, soft, nontender.   Lymph: mild peripheral edema. No cervical lymphadenopathy.  Neurological: confused, unable to assess  Skin: No rashes or ulcers   Psych:  confused, unable to assess    LABS: All Labs Reviewed:                        10.1   6.54  )-----------( 74       ( 27 Nov 2019 18:42 )             27.4                         9.9    6.78  )-----------( 71       ( 27 Nov 2019 08:17 )             28.3                         11.1   6.15  )-----------( 62       ( 26 Nov 2019 10:37 )             30.8     28 Nov 2019 06:29    129    |  82     |  70     ----------------------------<  147    3.5     |  29     |  2.70   27 Nov 2019 22:07    127    |  81     |  70     ----------------------------<  205    3.9     |  30     |  2.68   27 Nov 2019 18:42    127    |  81     |  70     ----------------------------<  195    3.9     |  31     |  2.63     Ca    10.4       28 Nov 2019 06:29  Ca    10.1       27 Nov 2019 22:07  Ca    10.0       27 Nov 2019 18:42    TPro  6.9    /  Alb  3.0    /  TBili  8.1    /  DBili  x      /  AST  36     /  ALT  23     /  AlkPhos  182    28 Nov 2019 06:29  TPro  6.7    /  Alb  3.2    /  TBili  8.4    /  DBili  3.5    /  AST  34     /  ALT  24     /  AlkPhos  191    27 Nov 2019 06:31  TPro  7.6    /  Alb  3.5    /  TBili  8.7    /  DBili  x      /  AST  39     /  ALT  26     /  AlkPhos  232    26 Nov 2019 10:37    PT/INR - ( 27 Nov 2019 07:56 )   PT: 22.9 sec;   INR: 1.97 ratio         PTT - ( 26 Nov 2019 10:38 )  PTT:35.2 sec      Blood Culture: Organism --  Gram Stain Blood -- Gram Stain --  Specimen Source .Urine Clean Catch (Midstream)  Culture-Blood --            RADIOLOGY:    EKG: sr, prolonged qtc

## 2019-11-28 NOTE — CONSULT NOTE ADULT - ASSESSMENT
63 year old male with HTN, DM2, non-alcoholic cirrhosis, who presents with altered mental status. We last saw him during a hospitalization in 10/2019, during which he was significantly volume overloaded and required iv diuresis.  He is confused this morning and is unable to give an interval history. His hyponatremia is improving, though his ammonia remans high.    Abnormal EKG  - EKG is notable for prolonged qtc  - avoid qtc prolonging medications  - keep K>4, Mg>2  - repeat daily EKG    Diastolic HF   - no sign of acute ischemia  - no significant volume overload on exam. CT without pleural effusions or edema, though notable for possible chest wall hematoma.  - can hold diuretics in the short term, though remains with hyponatremia and altered mental status  - GI and renal follow up.  - TTE normal LV systolic function EF 65%; LVH, DD Grade I    HTN  - BP on softer side  - hold diuretics and anti-hypertensives    - cont statin for cad/atherosclerosis noted on ct  - lfts remain within normal limits    - Watch creatinine and electrolytes. Keep K>4, mg>2  - All other workup as per primary team  - Will follow with you. Further cardiac w/u as indicated by clinical course

## 2019-11-28 NOTE — PROGRESS NOTE ADULT - ASSESSMENT
pt with above history p/w difficulty ambulation / weakness       - flap tremor + on Exam , likely hepatic encephalopathy -  lactulose po - no bms still - GI ordered lactulose enema- will f/u  - titrate to 2-3 bms-     - hyperbilirubinemia- ultrasound liver to r/o obstruction , GI f/u , cont rifaximin     - ARF on ckd stage 3 - likely hepatorenal , avoid nephrotoxic agents bladder scan to r/o retention , renal f/u - pt on aldactone+ Lasix+ zaroxolyn - pt doesn't appear fluid overloaded at present, hold diuretics    - dm2 - iss     - Thrombocytopenia - likely sec to liver pathology     - coagulopathy - likely sec to liver pathology , monitor closely for active signs of bleeding , vit k     - HLD - pt om parvastaTIN     ppi

## 2019-11-28 NOTE — PROGRESS NOTE ADULT - PROBLEM SELECTOR PLAN 1
- titrate lactulose for 3-4 BM/day; Lactulose Enema x 1 this morning as pt not tolerating po well and increasing ammonia; may need NGT placement for Lactulose if unable to tolerate po   - continue xifaxan bid   - ID/Hepatology/Renal input noted and appreciated; cont to follow recs

## 2019-11-28 NOTE — CONSULT NOTE ADULT - ASSESSMENT
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   He was noted to have hyponatremia with Na: 125 ( 131 on 11/08), MISA: Cr: 2.5 ( 1.5 on 11/08 ), worsening of his. T. Bili and ALP : 8.7, 232 ( 4.6, 119 on 11/03)  Imaging showed: A new 1.5 cm rounded high density focus within the left chest wall, likely represent a subcutaneous hematoma  U/S abdomen: Status post cholecystectomy. No intra or extrahepatic biliary ductal dilatation. Hepatic cirrhosis. Trace perihepatic ascites.    Impression:  # Encephalopathy likely hepatic due to MISA, hyponatremia ( not sure about compliance of medications)   # JEAN _Cirrhosis, MELD-Na _33 ( 11/28/19)        - varices: EGD 10/2019 reported as none        - ascites: Trace on U/S        - SPE: Likely present? on Xifaxan and lactulose at home?        - HCC: none on U/S 11/2019        - work up: unknown   # MISA on CKD DDx: pre renal vs HRS vs drug induced vs ATN ( recent sepsis)  # Hyponatremia (improving ) no signs of fluid overload likely due to diuretics   # Subcutaneous chest hematoma s/p fall      Recommendations:  - trend CBC, INR, CMP   - Start Albumin 25% infusion every 6 hours for 2 days for fluid challenge.  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   He was noted to have hyponatremia with Na: 125 ( 131 on 11/08), MISA: Cr: 2.5 ( 1.5 on 11/08 ), worsening of his. T. Bili and ALP : 8.7, 232 ( 4.6, 119 on 11/03)  Imaging showed: A new 1.5 cm rounded high density focus within the left chest wall, likely represent a subcutaneous hematoma  U/S abdomen: Status post cholecystectomy. No intra or extrahepatic biliary ductal dilatation. Hepatic cirrhosis. Trace perihepatic ascites.    Impression:  # Encephalopathy likely hepatic due to MISA, hyponatremia ( not sure about compliance of medications)   # JEAN _Cirrhosis, MELD-Na _33 ( 11/28/19)        - varices: EGD 10/2019 reported as none        - ascites: Trace on U/S        - SPE: Likely present? on Xifaxan and lactulose at home?        - HCC: none on U/S 11/2019        - work up: unknown   # MISA on CKD DDx: pre renal vs HRS vs drug induced vs ATN ( recent sepsis)  # Hyponatremia (improving ) no signs of fluid overload likely due to diuretics   # Subcutaneous chest hematoma s/p fall      Recommendations:  - trend CBC, INR, CMP   - Start Albumin 25% infusion every 6 hours for 2 days for fluid challenge.  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   He was noted to have hyponatremia with Na: 125 ( 131 on 11/08), MISA: Cr: 2.5 ( 1.5 on 11/08 ), worsening of his. T. Bili and ALP : 8.7, 232 ( 4.6, 119 on 11/03)  Imaging showed: A new 1.5 cm rounded high density focus within the left chest wall, likely represent a subcutaneous hematoma  U/S abdomen: Status post cholecystectomy. No intra or extrahepatic biliary ductal dilatation. Hepatic cirrhosis. Trace perihepatic ascites.    Impression:  # Encephalopathy likely hepatic due to MISA, hyponatremia ( not sure about compliance of medications)   # JEAN _Cirrhosis, MELD-Na _33 ( 11/28/19)        - varices: EGD 10/2019 reported as none        - ascites: Trace on U/S        - SPE: Likely present? on Xifaxan and lactulose at home?        - HCC: none on U/S 11/2019        - work up: unknown   # MISA on CKD DDx: pre renal vs HRS vs drug induced vs ATN ( recent sepsis)  # Hyponatremia (improving ) no signs of fluid overload likely due to diuretics   # Subcutaneous chest hematoma s/p fall      Recommendations:  - Check UA/UC, check blood cultures  - trend CBC, INR, CMP   - Start Albumin 25% infusion every 6 hours for 2 days for fluid challenge.  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   He was noted to have hyponatremia with Na: 125 ( 131 on 11/08), MISA: Cr: 2.5 ( 1.5 on 11/08 ), worsening of his. T. Bili and ALP : 8.7, 232 ( 4.6, 119 on 11/03)  Imaging showed: A new 1.5 cm rounded high density focus within the left chest wall, likely represent a subcutaneous hematoma  U/S abdomen: Status post cholecystectomy. No intra or extrahepatic biliary ductal dilatation. Hepatic cirrhosis. Trace perihepatic ascites.    Impression:  # Encephalopathy likely hepatic due to MISA, hyponatremia ( not sure about compliance of medications)   # JEAN _Cirrhosis, MELD-Na _33 ( 11/28/19)        - varices: EGD 10/2019 reported as none        - ascites: Trace on U/S        - SPE: Likely present? on Xifaxan and lactulose at home?        - HCC: none on U/S 11/2019        - work up: unknown   # MISA on CKD DDx: pre renal vs HRS vs drug induced vs ATN ( recent sepsis)  # Hyponatremia (improving ) no signs of fluid overload likely due to diuretics   # Subcutaneous chest hematoma s/p fall      Recommendations:  - Check abdominal U/S with dopplers  - Check blood cultures  - trend CBC, INR, CMP   - Start Albumin 25% infusion every 6 hours for 2 days for fluid challenge.  - Lactulose PO 30 gm every 4 hours until mental status is back to baseline ( give enema if he can't take PO)    - Continue to hold diuretics for now

## 2019-11-28 NOTE — PROGRESS NOTE ADULT - PROBLEM SELECTOR PLAN 2
- recent EGD 5/2019 non-bleeding duodenal ulcers and No evidence of varices   - cirrhosis 2/2 NAFLD   - US Abd without ascites; CT abd without new gi pathology; no liver lesions  - continue xifaxan and lactulose as above   - Protonix 40mg PO QD   - hepatology input appreciated

## 2019-11-28 NOTE — CONSULT NOTE ADULT - ATTENDING COMMENTS
63M, HTN, DM2, obesity BMI 34, HFpEF grade 1, BPH, decompensated JEAN cirrhosis, ascites, hepatic encephalopathy, GAVE with bleed 10/8/19 ([place]) s/p APC also duodenal AVM s/p APC, f/b Dr. Padilla's group, S. agalactiae bacteremia 10/22/19 ([place]) treated with ceftriaxone, adm. on 11/26 from rehab because of confusion, found MELD increase with MISA creat 2.55 and increased bilirubin 8.8 from 4.4 on 11/03. Findings included no ascites, confusion that has not improved, and a small chest wall hematoma of 1.5 cm. He has been treated with increased lactulose, rifaximin, IV albumin. Diuretics have been held now.  Home meds: lactulose, rifaximin, lasix/spironolactone 80/50, metolazone 2.5, KCl 10, sucralfate, PPI bid, pravastatin, ursodiol, insulin.    On exam, obese, moaning complaining of feeling very cold - uncovered after bedsheets exchanged, not opening eyes spontaneously, alert, but disoriented. Extraocular eye movements not intact, followed my finger only minimally when I opened his eyelids. Miosis. Asterixis. Abdomen obese, soft, nontender.  Echo 10/25: heart grossly normal, mild RV enlargement.    A/P:  - decompensated cirrhosis, likely 2/2 JEAN; MELD-Na increase to 34 on admission. Possible causes include infection, portal vein thrombosis, overdiuresis/reduced fluid intake.  - h/o ascites, now none. Diuretics dosed for CHF, which his last echo did not show. Was also on KCl.   - confusion: hepatic encephalopathy, may have Wernicke's encephalopathy given impaired extra-ocular eye movements. Limited historian, need further information regarding drinking; CT head 11/26 showed moderate atrophy, no acute process. Mild transient hyponatremia resolved.  - ID: lungs clear, UA negative. No ascites.    -- would give high-dose thiamine 500 mg IV q8hrs for two days, then re-evaluate. In my experience, improvement seen the next day.  -- MISA: would give 0.5 L NaCl as pt. currently not drinking, then albumin 25 g q6hrs for 1d, then re-evaluate. In future, would give lasix/spironolactone at a ratio 40/100 mg/d (may need less), and not use metolazone and KCl, as his fluid overload is caused by cirrhosis. His last echo did not show CHF.  -- lactulose, rifaximin  -- would stop ursodiol   -- will need to address social issues with family once available. Transplant candidacy will depend on this and on response of mental status to treatment as above.

## 2019-11-28 NOTE — PROGRESS NOTE ADULT - SUBJECTIVE AND OBJECTIVE BOX
INTERVAL HPI/OVERNIGHT EVENTS:    pt more confused this morning   episodes of n/v earlier this am   denying BM's    MEDICATIONS  (STANDING):  atorvastatin 10 milliGRAM(s) Oral at bedtime  dextrose 5%. 1000 milliLiter(s) (50 mL/Hr) IV Continuous <Continuous>  dextrose 50% Injectable 12.5 Gram(s) IV Push once  dextrose 50% Injectable 25 Gram(s) IV Push once  dextrose 50% Injectable 25 Gram(s) IV Push once  insulin glargine Injectable (LANTUS) 20 Unit(s) SubCutaneous at bedtime  insulin lispro (HumaLOG) corrective regimen sliding scale   SubCutaneous three times a day before meals  insulin lispro (HumaLOG) corrective regimen sliding scale   SubCutaneous at bedtime  lactulose Retention Enema 200 Gram(s) Rectal once  lactulose Syrup 30 Gram(s) Oral every 4 hours  pantoprazole    Tablet 40 milliGRAM(s) Oral before breakfast  phytonadione   Solution 5 milliGRAM(s) Oral daily  rifAXIMin 550 milliGRAM(s) Oral two times a day  sodium chloride 2 Gram(s) Oral three times a day  sucralfate 1 Gram(s) Oral two times a day  tamsulosin 0.4 milliGRAM(s) Oral at bedtime  ursodiol Capsule 300 milliGRAM(s) Oral two times a day    MEDICATIONS  (PRN):  dextrose 40% Gel 15 Gram(s) Oral once PRN Blood Glucose LESS THAN 70 milliGRAM(s)/deciliter  glucagon  Injectable 1 milliGRAM(s) IntraMuscular once PRN Glucose LESS THAN 70 milligrams/deciliter      Allergies    codeine (Anaphylaxis)    Intolerances    NO  RED MEAT (Unknown)      Review of Systems: *confused    General:  No wt loss, fevers, chills, night sweats, fatigue   Eyes:  Good vision, no reported pain  ENT:  No sore throat, pain, runny nose, dysphagia  CV:  No pain, palpitations, hypo/hypertension  Resp:  No dyspnea, cough, tachypnea, wheezing  GI:  No pain, No nausea, No vomiting, No diarrhea, No constipation, No weight loss, No fever, No pruritis, No rectal bleeding, No melena, No dysphagia  :  No pain, bleeding, incontinence, nocturia  Muscle:  No pain, weakness  Neuro:  No weakness, tingling, memory problems  Psych:  No fatigue, insomnia, mood problems, depression  Endocrine:  No polyuria, polydypsia, cold/heat intolerance  Heme:  No petechiae, ecchymosis, easy bruisability  Skin:  No rash, tattoos, scars, edema      Vital Signs Last 24 Hrs  T(C): 36.4 (2019 03:54), Max: 36.5 (2019 19:44)  T(F): 97.5 (2019 03:54), Max: 97.7 (2019 19:44)  HR: 69 (2019 03:54) (69 - 72)  BP: 99/52 (2019 03:54) (93/53 - 99/52)  BP(mean): --  RR: 18 (2019 03:54) (18 - 18)  SpO2: 96% (2019 03:54) (96% - 97%)    PHYSICAL EXAM:    Constitutional: NAD  HEENT: EOMI, throat clear  Neck: No LAD, supple  Respiratory: CTA and P  Cardiovascular: S1 and S2, RRR, no M  Gastrointestinal: BS+, soft, NT/ND, neg HSM,  Extremities: No peripheral edema, neg clubbing, cyanosis  Vascular: 2+ peripheral pulses  Neurological: A/O x 1, no focal deficits  Psychiatric: Normal mood, normal affect  Skin: No rashes      LABS:                        10.2   6.18  )-----------( 67       ( 2019 08:52 )             29.1     11    129<L>  |  82<L>  |  70<H>  ----------------------------<  147<H>  3.5   |  29  |  2.70<H>    Ca    10.4      2019 06:29    TPro  6.9  /  Alb  3.0<L>  /  TBili  8.1<H>  /  DBili  x   /  AST  36  /  ALT  23  /  AlkPhos  182<H>  11    PT/INR - ( 2019 08:38 )   PT: 21.8 sec;   INR: 1.88 ratio         PTT - ( 2019 08:38 )  PTT:36.8 sec  Urinalysis Basic - ( 2019 08:42 )    Color: Yellow / Appearance: Slightly Turbid / S.013 / pH: x  Gluc: x / Ketone: Negative  / Bili: Negative / Urobili: <2 mg/dL   Blood: x / Protein: Trace / Nitrite: Negative   Leuk Esterase: Negative / RBC: 100 /HPF / WBC 4 /HPF   Sq Epi: x / Non Sq Epi: 0 /HPF / Bacteria: Negative        RADIOLOGY & ADDITIONAL TESTS:

## 2019-11-29 LAB
ALBUMIN SERPL ELPH-MCNC: 3.9 G/DL — SIGNIFICANT CHANGE UP (ref 3.3–5)
ALP SERPL-CCNC: 160 U/L — HIGH (ref 40–120)
ALT FLD-CCNC: 24 U/L — SIGNIFICANT CHANGE UP (ref 10–45)
AMMONIA BLD-MCNC: 94 UMOL/L — HIGH (ref 11–55)
ANION GAP SERPL CALC-SCNC: 20 MMOL/L — HIGH (ref 5–17)
ANION GAP SERPL CALC-SCNC: 20 MMOL/L — HIGH (ref 5–17)
APTT BLD: 37.6 SEC — HIGH (ref 27.5–36.3)
AST SERPL-CCNC: 37 U/L — SIGNIFICANT CHANGE UP (ref 10–40)
BILIRUB SERPL-MCNC: 8.9 MG/DL — HIGH (ref 0.2–1.2)
BUN SERPL-MCNC: 68 MG/DL — HIGH (ref 7–23)
BUN SERPL-MCNC: 69 MG/DL — HIGH (ref 7–23)
CALCIUM SERPL-MCNC: 11 MG/DL — HIGH (ref 8.4–10.5)
CALCIUM SERPL-MCNC: 11 MG/DL — HIGH (ref 8.4–10.5)
CHLORIDE SERPL-SCNC: 86 MMOL/L — LOW (ref 96–108)
CHLORIDE SERPL-SCNC: 87 MMOL/L — LOW (ref 96–108)
CO2 SERPL-SCNC: 27 MMOL/L — SIGNIFICANT CHANGE UP (ref 22–31)
CO2 SERPL-SCNC: 28 MMOL/L — SIGNIFICANT CHANGE UP (ref 22–31)
CREAT SERPL-MCNC: 2.31 MG/DL — HIGH (ref 0.5–1.3)
CREAT SERPL-MCNC: 2.55 MG/DL — HIGH (ref 0.5–1.3)
GLUCOSE BLDC GLUCOMTR-MCNC: 141 MG/DL — HIGH (ref 70–99)
GLUCOSE BLDC GLUCOMTR-MCNC: 143 MG/DL — HIGH (ref 70–99)
GLUCOSE BLDC GLUCOMTR-MCNC: 150 MG/DL — HIGH (ref 70–99)
GLUCOSE BLDC GLUCOMTR-MCNC: 173 MG/DL — HIGH (ref 70–99)
GLUCOSE BLDC GLUCOMTR-MCNC: 193 MG/DL — HIGH (ref 70–99)
GLUCOSE SERPL-MCNC: 167 MG/DL — HIGH (ref 70–99)
GLUCOSE SERPL-MCNC: 174 MG/DL — HIGH (ref 70–99)
HCT VFR BLD CALC: 29.5 % — LOW (ref 39–50)
HGB BLD-MCNC: 10.5 G/DL — LOW (ref 13–17)
INR BLD: 1.74 RATIO — HIGH (ref 0.88–1.16)
MCHC RBC-ENTMCNC: 33.8 PG — SIGNIFICANT CHANGE UP (ref 27–34)
MCHC RBC-ENTMCNC: 35.6 GM/DL — SIGNIFICANT CHANGE UP (ref 32–36)
MCV RBC AUTO: 94.9 FL — SIGNIFICANT CHANGE UP (ref 80–100)
PLATELET # BLD AUTO: 71 K/UL — LOW (ref 150–400)
POTASSIUM SERPL-MCNC: 3.1 MMOL/L — LOW (ref 3.5–5.3)
POTASSIUM SERPL-MCNC: 3.5 MMOL/L — SIGNIFICANT CHANGE UP (ref 3.5–5.3)
POTASSIUM SERPL-SCNC: 3.1 MMOL/L — LOW (ref 3.5–5.3)
POTASSIUM SERPL-SCNC: 3.5 MMOL/L — SIGNIFICANT CHANGE UP (ref 3.5–5.3)
PROT SERPL-MCNC: 7.5 G/DL — SIGNIFICANT CHANGE UP (ref 6–8.3)
PROTHROM AB SERPL-ACNC: 20.4 SEC — HIGH (ref 10–13.1)
RBC # BLD: 3.11 M/UL — LOW (ref 4.2–5.8)
RBC # FLD: 19.5 % — HIGH (ref 10.3–14.5)
SODIUM SERPL-SCNC: 134 MMOL/L — LOW (ref 135–145)
SODIUM SERPL-SCNC: 134 MMOL/L — LOW (ref 135–145)
WBC # BLD: 7.07 K/UL — SIGNIFICANT CHANGE UP (ref 3.8–10.5)
WBC # FLD AUTO: 7.07 K/UL — SIGNIFICANT CHANGE UP (ref 3.8–10.5)

## 2019-11-29 PROCEDURE — 99232 SBSQ HOSP IP/OBS MODERATE 35: CPT

## 2019-11-29 PROCEDURE — 99223 1ST HOSP IP/OBS HIGH 75: CPT | Mod: GC

## 2019-11-29 RX ORDER — SODIUM CHLORIDE 9 MG/ML
1000 INJECTION, SOLUTION INTRAVENOUS
Refills: 0 | Status: DISCONTINUED | OUTPATIENT
Start: 2019-11-29 | End: 2019-11-29

## 2019-11-29 RX ORDER — POTASSIUM CHLORIDE 20 MEQ
10 PACKET (EA) ORAL
Refills: 0 | Status: COMPLETED | OUTPATIENT
Start: 2019-11-29 | End: 2019-11-29

## 2019-11-29 RX ORDER — POTASSIUM CHLORIDE 20 MEQ
20 PACKET (EA) ORAL ONCE
Refills: 0 | Status: COMPLETED | OUTPATIENT
Start: 2019-11-29 | End: 2019-11-29

## 2019-11-29 RX ORDER — THIAMINE MONONITRATE (VIT B1) 100 MG
500 TABLET ORAL THREE TIMES A DAY
Refills: 0 | Status: COMPLETED | OUTPATIENT
Start: 2019-11-29 | End: 2019-12-02

## 2019-11-29 RX ORDER — SODIUM CHLORIDE 9 MG/ML
500 INJECTION INTRAMUSCULAR; INTRAVENOUS; SUBCUTANEOUS ONCE
Refills: 0 | Status: COMPLETED | OUTPATIENT
Start: 2019-11-29 | End: 2019-11-29

## 2019-11-29 RX ADMIN — Medication 1 GRAM(S): at 05:56

## 2019-11-29 RX ADMIN — LACTULOSE 30 GRAM(S): 10 SOLUTION ORAL at 10:02

## 2019-11-29 RX ADMIN — SODIUM CHLORIDE 500 MILLILITER(S): 9 INJECTION INTRAMUSCULAR; INTRAVENOUS; SUBCUTANEOUS at 17:10

## 2019-11-29 RX ADMIN — Medication 50 MILLILITER(S): at 05:55

## 2019-11-29 RX ADMIN — LACTULOSE 30 GRAM(S): 10 SOLUTION ORAL at 01:26

## 2019-11-29 RX ADMIN — Medication 100 MILLIEQUIVALENT(S): at 13:08

## 2019-11-29 RX ADMIN — SODIUM CHLORIDE 50 MILLILITER(S): 9 INJECTION, SOLUTION INTRAVENOUS at 16:18

## 2019-11-29 RX ADMIN — Medication 100 MILLIEQUIVALENT(S): at 12:00

## 2019-11-29 RX ADMIN — Medication 1 GRAM(S): at 17:18

## 2019-11-29 RX ADMIN — Medication 50 MILLILITER(S): at 12:00

## 2019-11-29 RX ADMIN — Medication 100 MILLIEQUIVALENT(S): at 10:02

## 2019-11-29 RX ADMIN — ATORVASTATIN CALCIUM 10 MILLIGRAM(S): 80 TABLET, FILM COATED ORAL at 21:44

## 2019-11-29 RX ADMIN — TAMSULOSIN HYDROCHLORIDE 0.4 MILLIGRAM(S): 0.4 CAPSULE ORAL at 21:44

## 2019-11-29 RX ADMIN — PANTOPRAZOLE SODIUM 40 MILLIGRAM(S): 20 TABLET, DELAYED RELEASE ORAL at 06:11

## 2019-11-29 RX ADMIN — Medication 1: at 08:58

## 2019-11-29 RX ADMIN — Medication 50 MILLILITER(S): at 23:59

## 2019-11-29 RX ADMIN — URSODIOL 300 MILLIGRAM(S): 250 TABLET, FILM COATED ORAL at 05:56

## 2019-11-29 RX ADMIN — URSODIOL 300 MILLIGRAM(S): 250 TABLET, FILM COATED ORAL at 17:18

## 2019-11-29 RX ADMIN — INSULIN GLARGINE 20 UNIT(S): 100 INJECTION, SOLUTION SUBCUTANEOUS at 21:51

## 2019-11-29 RX ADMIN — Medication 5 MILLIGRAM(S): at 13:07

## 2019-11-29 RX ADMIN — Medication 105 MILLIGRAM(S): at 21:38

## 2019-11-29 RX ADMIN — Medication 20 MILLIEQUIVALENT(S): at 10:02

## 2019-11-29 RX ADMIN — Medication 50 MILLILITER(S): at 17:09

## 2019-11-29 RX ADMIN — LACTULOSE 30 GRAM(S): 10 SOLUTION ORAL at 05:56

## 2019-11-29 NOTE — PROGRESS NOTE ADULT - SUBJECTIVE AND OBJECTIVE BOX
INTERVAL HPI/OVERNIGHT EVENTS:    son bedside asking to speak with palliative care and hepatology re: ?liver TP  pt confused expressed no abd pain   (+) large soft brown bm noted during exam       MEDICATIONS  (STANDING):  albumin human 25% IVPB 100 milliLiter(s) IV Intermittent every 6 hours  atorvastatin 10 milliGRAM(s) Oral at bedtime  dextrose 5%. 1000 milliLiter(s) (50 mL/Hr) IV Continuous <Continuous>  dextrose 50% Injectable 12.5 Gram(s) IV Push once  dextrose 50% Injectable 25 Gram(s) IV Push once  dextrose 50% Injectable 25 Gram(s) IV Push once  insulin glargine Injectable (LANTUS) 20 Unit(s) SubCutaneous at bedtime  insulin lispro (HumaLOG) corrective regimen sliding scale   SubCutaneous three times a day before meals  insulin lispro (HumaLOG) corrective regimen sliding scale   SubCutaneous at bedtime  lactulose Syrup 30 Gram(s) Oral every 4 hours  pantoprazole    Tablet 40 milliGRAM(s) Oral before breakfast  phytonadione   Solution 5 milliGRAM(s) Oral daily  potassium chloride  10 mEq/100 mL IVPB 10 milliEquivalent(s) IV Intermittent every 1 hour  rifAXIMin 550 milliGRAM(s) Oral two times a day  sucralfate 1 Gram(s) Oral two times a day  tamsulosin 0.4 milliGRAM(s) Oral at bedtime  ursodiol Capsule 300 milliGRAM(s) Oral two times a day    MEDICATIONS  (PRN):  dextrose 40% Gel 15 Gram(s) Oral once PRN Blood Glucose LESS THAN 70 milliGRAM(s)/deciliter  glucagon  Injectable 1 milliGRAM(s) IntraMuscular once PRN Glucose LESS THAN 70 milligrams/deciliter      Allergies    codeine (Anaphylaxis)    Intolerances    NO  RED MEAT (Unknown)      Review of Systems:    General:  No wt loss, fevers, chills, night sweats, fatigue   Eyes:  Good vision, no reported pain  ENT:  No sore throat, pain, runny nose, dysphagia  CV:  No pain, palpitations, hypo/hypertension  Resp:  No dyspnea, cough, tachypnea, wheezing  GI:  No pain, No nausea, No vomiting, No diarrhea, No constipation, No weight loss, No fever, No pruritis, No rectal bleeding, No melena, No dysphagia  :  No pain, bleeding, incontinence, nocturia  Muscle:  No pain, weakness  Neuro:  No weakness, tingling, memory problems  Psych:  No fatigue, insomnia, mood problems, depression  Endocrine:  No polyuria, polydypsia, cold/heat intolerance  Heme:  No petechiae, ecchymosis, easy bruisability  Skin:  No rash, tattoos, scars, edema      Vital Signs Last 24 Hrs  T(C): 36.5 (29 Nov 2019 05:53), Max: 36.5 (29 Nov 2019 05:53)  T(F): 97.7 (29 Nov 2019 05:53), Max: 97.7 (29 Nov 2019 05:53)  HR: 87 (29 Nov 2019 05:53) (77 - 87)  BP: 109/64 (29 Nov 2019 05:53) (95/55 - 111/65)  BP(mean): --  RR: 18 (29 Nov 2019 05:53) (18 - 18)  SpO2: 100% (29 Nov 2019 05:53) (97% - 100%)    PHYSICAL EXAM:    Constitutional: NAD  HEENT: EOMI, throat clear  Neck: No LAD, supple  Respiratory: CTA and P  Cardiovascular: S1 and S2, RRR, no M  Gastrointestinal: BS+, soft, NT/ND, neg HSM,  Extremities: No peripheral edema, neg clubbing, cyanosis  Vascular: 2+ peripheral pulses  Neurological: A/O x 1, no focal deficits  Psychiatric: Normal mood, normal affect  Skin: No rashes +jaundice      LABS:                        10.5   7.07  )-----------( 71       ( 29 Nov 2019 08:30 )             29.5     11-29    134<L>  |  86<L>  |  68<H>  ----------------------------<  167<H>  3.1<L>   |  28  |  2.55<H>    Ca    11.0<H>      29 Nov 2019 07:06    TPro  7.5  /  Alb  3.9  /  TBili  8.9<H>  /  DBili  x   /  AST  37  /  ALT  24  /  AlkPhos  160<H>  11-29    PT/INR - ( 29 Nov 2019 08:22 )   PT: 20.4 sec;   INR: 1.74 ratio         PTT - ( 29 Nov 2019 08:22 )  PTT:37.6 sec      RADIOLOGY & ADDITIONAL TESTS:

## 2019-11-29 NOTE — PROGRESS NOTE ADULT - ASSESSMENT
63 year old male with HTN, DM2, non-alcoholic cirrhosis, who presents with altered mental status. We last saw him during a hospitalization in 10/2019, during which he was significantly volume overloaded and required iv diuresis.  He is confused this morning and is unable to give an interval history. His hyponatremia is improving, though his ammonia remans high.    Abnormal EKG  - EKG is notable for prolonged qtc  - avoid qtc prolonging medications  - keep K>4, Mg>2  - repeat daily EKG    Diastolic HF   - no sign of acute ischemia  - no significant volume overload on exam. CT without pleural effusions or edema, though notable for possible chest wall hematoma.  - can hold diuretics in the short term, though remains with hyponatremia and altered mental status  - GI and renal follow up.  - TTE normal LV systolic function EF 65%; LVH, DD Grade I    HTN  - BP on softer side  - hold diuretics and anti-hypertensives    - cont statin for cad/atherosclerosis noted on ct  - lfts remain within normal limits    - Watch creatinine and electrolytes. Keep K>4, mg>2  - All other workup as per primary team  - Will follow with you. Further cardiac w/u as indicated by clinical course 63 year old male with HTN, DM2, non-alcoholic cirrhosis, who presents with altered mental status. We last saw him during a hospitalization in 10/2019, during which he was significantly volume overloaded and required iv diuresis.  He is confused this morning and is unable to give an interval history. His hyponatremia is improving, though his ammonia remans high.    Abnormal EKG  - EKG is notable for prolonged qtc  - avoid qtc prolonging medications  - replete K  - repeat daily EKG    Diastolic HF   - no sign of acute ischemia  - no significant volume overload on exam. CT without pleural effusions or edema, though notable for possible chest wall hematoma.  - can hold diuretics in the short term, though remains with hyponatremia and altered mental status  - GI and renal follow up.  - TTE normal LV systolic function EF 65%; LVH, DD Grade I    HTN  - BP on softer side  - hold diuretics and anti-hypertensives    - cont statin for cad/atherosclerosis noted on ct  - lfts remain within normal limits    - Watch creatinine and electrolytes. Keep K>4, mg>2  - All other workup as per primary team  - Will follow with you. Further cardiac w/u as indicated by clinical course

## 2019-11-29 NOTE — PROGRESS NOTE ADULT - PROBLEM SELECTOR PLAN 1
hypokalemia will supplemented   possible due to prerenal azotemia , hold lasix   increase water intake , low salt   will f/u with dena gamez   Serum creatinine is 2.55   , approximating GFR is decreased    ml/min.   There is  progression . No uremic symptoms    pos evidence of anemia .  Fluid status stable.  Will continue to avoid nephrotoxic drugs.  Patient remains asymptomatic.   Continue current therapy.  hold diuresis hold arb   catie acei.

## 2019-11-29 NOTE — PROGRESS NOTE ADULT - SUBJECTIVE AND OBJECTIVE BOX
Patient is a 63y Male whom presented to the hospital with ckd and dena     PAST MEDICAL & SURGICAL HISTORY:  GIB (gastrointestinal bleeding)  GERD with esophagitis: Gastritis &amp; Non Bleeding Ulcers  Hepatic encephalopathy  Obesity  Fatty liver disease, nonalcoholic  Renal stones: 25 years ago  Hypertension  Neuropathy  Hypercholesteremia  Diabetes  S/P cholecystectomy      MEDICATIONS  (STANDING):  dextrose 5%. 1000 milliLiter(s) (50 mL/Hr) IV Continuous <Continuous>  dextrose 50% Injectable 12.5 Gram(s) IV Push once  dextrose 50% Injectable 25 Gram(s) IV Push once  dextrose 50% Injectable 25 Gram(s) IV Push once  insulin lispro (HumaLOG) corrective regimen sliding scale   SubCutaneous three times a day before meals  insulin lispro (HumaLOG) corrective regimen sliding scale   SubCutaneous at bedtime      Allergies    codeine (Anaphylaxis)    Intolerances    NO  RED MEAT (Unknown)      SOCIAL HISTORY:  Denies ETOh,Smoking,     FAMILY HISTORY:  Family history of type 2 diabetes mellitus  Family history of hypertension  Family history of stomach cancer      REVIEW OF SYSTEMS:    CONSTITUTIONAL: No weakness, fevers or chills  EYES/ENT: No visual changes;  no throat pain   NECK: No pain or stiffness  RESPIRATORY: No cough, wheezing, hemoptysis; No shortness of breath  CARDIOVASCULAR: No chest pain or palpitations  GASTROINTESTINAL: No abdominal or epigastric pain. No nausea, vomiting,     No diarrhea or constipation. No melena   GENITOURINARY: No dysuria, frequency or hematuria  NEUROLOGICAL: No numbness or weakness  SKIN: dry                                                       10.5   7.07  )-----------( 71       ( 29 Nov 2019 08:30 )             29.5       CBC Full  -  ( 29 Nov 2019 08:30 )  WBC Count : 7.07 K/uL  RBC Count : 3.11 M/uL  Hemoglobin : 10.5 g/dL  Hematocrit : 29.5 %  Platelet Count - Automated : 71 K/uL  Mean Cell Volume : 94.9 fl  Mean Cell Hemoglobin : 33.8 pg  Mean Cell Hemoglobin Concentration : 35.6 gm/dL  Auto Neutrophil # : x  Auto Lymphocyte # : x  Auto Monocyte # : x  Auto Eosinophil # : x  Auto Basophil # : x  Auto Neutrophil % : x  Auto Lymphocyte % : x  Auto Monocyte % : x  Auto Eosinophil % : x  Auto Basophil % : x      11-29    134<L>  |  86<L>  |  68<H>  ----------------------------<  167<H>  3.1<L>   |  28  |  2.55<H>    Ca    11.0<H>      29 Nov 2019 07:06    TPro  7.5  /  Alb  3.9  /  TBili  8.9<H>  /  DBili  x   /  AST  37  /  ALT  24  /  AlkPhos  160<H>  11-29      CAPILLARY BLOOD GLUCOSE      POCT Blood Glucose.: 150 mg/dL (29 Nov 2019 13:32)  POCT Blood Glucose.: 141 mg/dL (29 Nov 2019 13:16)  POCT Blood Glucose.: 193 mg/dL (29 Nov 2019 08:34)  POCT Blood Glucose.: 174 mg/dL (28 Nov 2019 21:35)      Vital Signs Last 24 Hrs  T(C): 36.4 (29 Nov 2019 12:46), Max: 36.5 (29 Nov 2019 05:53)  T(F): 97.6 (29 Nov 2019 12:46), Max: 97.7 (29 Nov 2019 05:53)  HR: 88 (29 Nov 2019 12:46) (85 - 88)  BP: 108/62 (29 Nov 2019 12:46) (95/55 - 109/64)  BP(mean): --  RR: 18 (29 Nov 2019 12:46) (18 - 18)  SpO2: 100% (29 Nov 2019 12:46) (97% - 100%)        PT/INR - ( 29 Nov 2019 08:22 )   PT: 20.4 sec;   INR: 1.74 ratio         PTT - ( 29 Nov 2019 08:22 )  PTT:37.6 sec    PHYSICAL EXAM:    Constitutional: NAD  HEENT: conjunctive   clear   Neck:  No JVD  Respiratory: CTAB  Cardiovascular: S1 and S2  Gastrointestinal: BS+, soft, NT/ND  Extremities: No peripheral edema  Neurological: A/O x 3, no focal deficits  Psychiatric: Normal mood, normal affect  : No Beasley  Skin: No rashes  Access: Not applicable

## 2019-11-29 NOTE — PROGRESS NOTE ADULT - ASSESSMENT
pt with above history p/w difficulty ambulation / weakness       - flap tremor + on Exam , likely hepatic encephalopathy -  pt having bms after lactulose enema, monitor mental status closely     - hyperbilirubinemia- ultrasound liver to r/o obstruction , GI f/u , cont rifaximin     - ARF on ckd stage 3 - likely hepatorenal , avoid nephrotoxic agents bladder scan to r/o retention , renal f/u - pt on aldactone+ Lasix+ zaroxolyn - pt doesn't appear fluid overloaded at present, hold diuretics, gentle iv fluids with close monitor of pts volume status    - dm2 - iss     - Thrombocytopenia - likely sec to liver pathology     - coagulopathy - likely sec to liver pathology , monitor closely for active signs of bleeding , vit k     - HLD - pt om parvastaTIN     had extensive lengthy discussion with pts wife /sons about pts current clinical status, management plan &  prognosis - guarded prognosis - palliative eval    ppi

## 2019-11-29 NOTE — PROGRESS NOTE ADULT - SUBJECTIVE AND OBJECTIVE BOX
Patient is a 63y old  Male who presents with a chief complaint of difficulty to ambulate/ weakness (28 Nov 2019 22:19)    Being followed by ID for ?hepatic encephalopathy    Interval history:confused  No acute events      ROS:  No cough,or diarrhea noted  Patient unable to provide ROS     Antimicrobials:    rifAXIMin 550 milliGRAM(s) Oral two times a day    Other medications reviewed    Vital Signs Last 24 Hrs  T(C): 36.5 (11-29-19 @ 05:53), Max: 36.5 (11-29-19 @ 05:53)  T(F): 97.7 (11-29-19 @ 05:53), Max: 97.7 (11-29-19 @ 05:53)  HR: 87 (11-29-19 @ 05:53) (77 - 87)  BP: 109/64 (11-29-19 @ 05:53) (95/55 - 111/65)  BP(mean): --  RR: 18 (11-29-19 @ 05:53) (18 - 18)  SpO2: 100% (11-29-19 @ 05:53) (97% - 100%)    Physical Exam:        HEENT PERRLA    No oral exudate or erythema    Chest Good AE,CTA    CVS RRR S1 S2 WNl No murmur or rub or gallop    Abd soft BS normal No tenderness no masses    IV site no erythema tenderness or discharge    CNS confused   Lab Data:                          10.5   7.07  )-----------( 71       ( 29 Nov 2019 08:30 )             29.5       11-29    134<L>  |  86<L>  |  68<H>  ----------------------------<  167<H>  3.1<L>   |  28  |  2.55<H>    Ca    11.0<H>      29 Nov 2019 07:06    TPro  7.5  /  Alb  3.9  /  TBili  8.9<H>  /  DBili  x   /  AST  37  /  ALT  24  /  AlkPhos  160<H>  11-29        Culture - Blood (collected 27 Nov 2019 14:48)  Source: .Blood Blood-Peripheral  Preliminary Report (28 Nov 2019 15:01):    No growth to date.    Culture - Blood (collected 27 Nov 2019 14:48)  Source: .Blood Blood-Peripheral  Preliminary Report (28 Nov 2019 15:01):    No growth to date.    Culture - Urine (collected 26 Nov 2019 17:08)  Source: .Urine Clean Catch (Midstream)  Final Report (27 Nov 2019 16:18):    <10,000 CFU/mL Normal Urogenital Shantel        < from: US Abdomen Complete (11.27.19 @ 11:31) >    IMPRESSION:     Status post cholecystectomy. No intra or extrahepatic biliary ductal   dilatation.    Hepatic cirrhosis.     Trace perihepatic ascites.              < end of copied text >

## 2019-11-29 NOTE — PROGRESS NOTE ADULT - ASSESSMENT
63M w/ pmh HTN, HLD, DM, non-alcoholic cirrhosis, chronic thrombocytopenia -- sent from rehab for worsening jaundice, dizzy/weakness/falls x 4 days.  h/o strep bacteremia 10/19 s/p RX  Now also with elevated Tb and creatinine  No other clinical s/s infectious process  No ascites  workup negative for infection  No antimicrobials indicated at present  Continue care per primary team  ID will follow as needed,please call [phone] if any questions or issues.

## 2019-11-29 NOTE — PROGRESS NOTE ADULT - SUBJECTIVE AND OBJECTIVE BOX
SUBJECTIVE / OVERNIGHT EVENTS: pt denies headache, nausea,v   chest pain, shortness of breath more confused than yesterday   + bms    MEDICATIONS  (STANDING):  albumin human 25% IVPB 100 milliLiter(s) IV Intermittent every 6 hours  atorvastatin 10 milliGRAM(s) Oral at bedtime  dextrose 5%. 1000 milliLiter(s) (50 mL/Hr) IV Continuous <Continuous>  dextrose 50% Injectable 12.5 Gram(s) IV Push once  dextrose 50% Injectable 25 Gram(s) IV Push once  dextrose 50% Injectable 25 Gram(s) IV Push once  insulin glargine Injectable (LANTUS) 20 Unit(s) SubCutaneous at bedtime  insulin lispro (HumaLOG) corrective regimen sliding scale   SubCutaneous three times a day before meals  insulin lispro (HumaLOG) corrective regimen sliding scale   SubCutaneous at bedtime  lactulose Syrup 30 Gram(s) Oral every 4 hours  pantoprazole    Tablet 40 milliGRAM(s) Oral before breakfast  rifAXIMin 550 milliGRAM(s) Oral two times a day  sucralfate 1 Gram(s) Oral two times a day  tamsulosin 0.4 milliGRAM(s) Oral at bedtime  thiamine IVPB 500 milliGRAM(s) IV Intermittent three times a day  ursodiol Capsule 300 milliGRAM(s) Oral two times a day    MEDICATIONS  (PRN):  dextrose 40% Gel 15 Gram(s) Oral once PRN Blood Glucose LESS THAN 70 milliGRAM(s)/deciliter  glucagon  Injectable 1 milliGRAM(s) IntraMuscular once PRN Glucose LESS THAN 70 milligrams/deciliter    Vital Signs Last 24 Hrs  T(C): 36.4 (2019 12:46), Max: 36.5 (2019 05:53)  T(F): 97.6 (2019 12:46), Max: 97.7 (2019 05:53)  HR: 88 (2019 12:46) (85 - 88)  BP: 108/62 (2019 12:46) (95/55 - 109/64)  BP(mean): --  RR: 18 (2019 12:46) (18 - 18)  SpO2: 100% (2019 12:46) (97% - 100%)    Constitutional: No fever, fatigue  Skin: No rash.  Eyes: No recent vision problems or eye pain.  ENT: No congestion, ear pain, or sore throat.  Cardiovascular: No chest pain or palpation.  Respiratory: No cough, shortness of breath, congestion, or wheezing.  Gastrointestinal: No abdominal pain, nausea, vomiting, or diarrhea.  Genitourinary: No dysuria.  Musculoskeletal: No joint swelling.  Neurologic: No headache.    PHYSICAL EXAM:  GENERAL: NAD  EYES: EOMI, PERRLA  NECK: Supple, No JVD  CHEST/LUNG: dec breath sounds at bases   HEART:  S1 , S2 +  ABDOMEN: soft , bs+, mild distension + , no tenderness  EXTREMITIES:  no edema  NEUROLOGY:alert awake oriented   flap tremor+    LABS:      134<L>  |  86<L>  |  68<H>  ----------------------------<  167<H>  3.1<L>   |  28  |  2.55<H>    Ca    11.0<H>      2019 07:06    TPro  7.5  /  Alb  3.9  /  TBili  8.9<H>  /  DBili      /  AST  37  /  ALT  24  /  AlkPhos  160<H>      Creatinine Trend: 2.55 <--, 2.59 <--, 2.70 <--, 2.68 <--, 2.63 <--, 2.67 <--, 2.61 <--, 2.55 <--                        10.5   7.07  )-----------( 71       ( 2019 08:30 )             29.5     Urine Studies:  Urinalysis Basic - ( 2019 08:42 )    Color: Yellow / Appearance: Slightly Turbid / S.013 / pH:   Gluc:  / Ketone: Negative  / Bili: Negative / Urobili: <2 mg/dL   Blood:  / Protein: Trace / Nitrite: Negative   Leuk Esterase: Negative / RBC: 100 /HPF / WBC 4 /HPF   Sq Epi:  / Non Sq Epi: 0 /HPF / Bacteria: Negative      Osmolality, Random Urine: 335 mosm/Kg ( @ 00:20)  Creatinine, Random Urine: 67 mg/dL ( @ 23:21)  Creatinine, Random Urine: 68 mg/dL ( @ 21:19)  Protein/Creatinine Ratio Calculation: 0.2 Ratio ( @ 21:19)  Potassium, Random Urine: 37 mmol/L ( @ 21:19)  Sodium, Random Urine: 61 mmol/L ( @ 21:19)          LIVER FUNCTIONS - ( 2019 07:06 )  Alb: 3.9 g/dL / Pro: 7.5 g/dL / ALK PHOS: 160 U/L / ALT: 24 U/L / AST: 37 U/L / GGT: x           PT/INR - ( 2019 08:22 )   PT: 20.4 sec;   INR: 1.74 ratio         PTT - ( 2019 08:22 )  PTT:37.6 sec  Color: Yellow / Appearance: Slightly Turbid / S.013 / pH:   Gluc:  / Ketone: Negative  / Bili: Negative / Urobili: <2 mg/dL   Blood:  / Protein: Trace / Nitrite: Negative   Leuk Esterase: Negative / RBC: 100 /HPF / WBC 4 /HPF   Sq Epi:  / Non Sq Epi: 0 /HPF / Bacteria: Negative      Osmolality, Random Urine: 335 mosm/Kg ( @ 00:20)  Creatinine, Random Urine: 67 mg/dL ( @ 23:21)  Creatinine, Random Urine: 68 mg/dL ( @ 21:19)  Protein/Creatinine Ratio Calculation: 0.2 Ratio ( @ 21:19)  Potassium, Random Urine: 37 mmol/L ( @ 21:19)  Sodium, Random Urine: 61 mmol/L ( @ 21:19)          LIVER FUNCTIONS - ( 2019 06:29 )  Alb: 3.0 g/dL / Pro: 6.9 g/dL / ALK PHOS: 182 U/L / ALT: 23 U/L / AST: 36 U/L / GGT: x           PT/INR - ( 2019 08:38 )   PT: 21.8 sec;   INR: 1.88 ratio         PTT - ( 2019 08:38 )  PTT:36.8 sec  Blood: x / Protein: Negative / Nitrite: Negative   Leuk Esterase: Negative / RBC: 14 /HPF / WBC 4 /HPF   Sq Epi: x / Non Sq Epi: 0 /HPF / Bacteria: Negative        RADIOLOGY & ADDITIONAL TESTS:    Imaging Personally Reviewed:    Consultant(s) Notes Reviewed:      Care Discussed with Consultants/Other Providers:

## 2019-11-29 NOTE — PROGRESS NOTE ADULT - SUBJECTIVE AND OBJECTIVE BOX
Follow up:     HPI:  63M w/ pmh HTN, HLD, DM, non-alcoholic cirrhosis, chronic thrombocytopenia -- sent from rehab for worsening jaundice, dizzy/weakness/falls x 4 days. Discharged to rehab after last hospitalization. No fever, n/v/d/c, chest / abd pain, cough, sob, dysuria/hematuria. recently admitted at OSH for sepsis, hypoglycemia, cirrhosis (lactulose, xifaxan), hepatic encephalopathy, GI bleed; G+ bacteremia, EGD, last bm yesterday morning (2019 17:37)    Patient remains lethargic this morning. No new cardiac events overnight. Diuretics have been on hold and his QT interval is being followed       PAST MEDICAL & SURGICAL HISTORY:  GIB (gastrointestinal bleeding)  GERD with esophagitis: Gastritis &amp; Non Bleeding Ulcers  Hepatic encephalopathy  Obesity  Fatty liver disease, nonalcoholic  Renal stones: 25 years ago  Hypertension  Neuropathy  Hypercholesteremia  Diabetes  S/P cholecystectomy      MEDICATIONS  (STANDING):  albumin human 25% IVPB 100 milliLiter(s) IV Intermittent every 6 hours  atorvastatin 10 milliGRAM(s) Oral at bedtime  dextrose 5%. 1000 milliLiter(s) (50 mL/Hr) IV Continuous <Continuous>  dextrose 50% Injectable 12.5 Gram(s) IV Push once  dextrose 50% Injectable 25 Gram(s) IV Push once  dextrose 50% Injectable 25 Gram(s) IV Push once  insulin glargine Injectable (LANTUS) 20 Unit(s) SubCutaneous at bedtime  insulin lispro (HumaLOG) corrective regimen sliding scale   SubCutaneous three times a day before meals  insulin lispro (HumaLOG) corrective regimen sliding scale   SubCutaneous at bedtime  lactulose Syrup 30 Gram(s) Oral every 4 hours  pantoprazole    Tablet 40 milliGRAM(s) Oral before breakfast  phytonadione   Solution 5 milliGRAM(s) Oral daily  potassium chloride  10 mEq/100 mL IVPB 10 milliEquivalent(s) IV Intermittent every 1 hour  rifAXIMin 550 milliGRAM(s) Oral two times a day  sucralfate 1 Gram(s) Oral two times a day  tamsulosin 0.4 milliGRAM(s) Oral at bedtime  ursodiol Capsule 300 milliGRAM(s) Oral two times a day    MEDICATIONS  (PRN):  dextrose 40% Gel 15 Gram(s) Oral once PRN Blood Glucose LESS THAN 70 milliGRAM(s)/deciliter  glucagon  Injectable 1 milliGRAM(s) IntraMuscular once PRN Glucose LESS THAN 70 milligrams/deciliter      Vital Signs Last 24 Hrs  T(C): 36.5 (2019 05:53), Max: 36.5 (2019 05:53)  T(F): 97.7 (2019 05:53), Max: 97.7 (2019 05:53)  HR: 87 (2019 05:53) (77 - 87)  BP: 109/64 (2019 05:53) (95/55 - 111/65)  BP(mean): --  RR: 18 (2019 05:53) (18 - 18)  SpO2: 100% (2019 05:53) (97% - 100%)    I&O's Summary    2019 07:01  -  2019 07:00  --------------------------------------------------------  IN: 380 mL / OUT: 1900 mL / NET: -1520 mL        PHYSICAL EXAM:    Constitutional: NAD,  well-developed  Eyes:  Pupils round, no lesions  ENMT: no exudate or erythema  Pulmonary: breath sounds are clear bilaterally, No wheezing, rales or rhonchi  Cardiovascular: PMI not palpable RRR normal S1 and S2, no murmurs, rubs, gallops or clicks  Gastrointestinal: Bowel Sounds present, soft, nontender.   Lymph: No cervical lymphadenopathy.  Neurological: lethargic  Skin: No rashes.  No cyanosis.  Psych:  cannot assess  Ext: 1+ lower ext edema                                10.5   7.07  )-----------( 71       ( 2019 08:30 )             29.5     11-29    134<L>  |  86<L>  |  68<H>  ----------------------------<  167<H>  3.1<L>   |  28  |  2.55<H>    Ca    11.0<H>      2019 07:06    TPro  7.5  /  Alb  3.9  /  TBili  8.9<H>  /  DBili  x   /  AST  37  /  ALT  24  /  AlkPhos  160<H>      < from: 12 Lead ECG (19 @ 06:02) >    Ventricular Rate 81 BPM    Atrial Rate 81 BPM    P-R Interval 170 ms    QRS Duration 100 ms    Q-T Interval 426 ms    QTC Calculation(Bezet) 494 ms    P Axis 32 degrees    R Axis 26 degrees    T Axis 41 degrees    Diagnosis Line NORMAL SINUS RHYTHM  PROLONGED QT  ABNORMAL ECG    Confirmed by MD Shaggy, Deon ([id number]) on 2019 9:40:06 AM    < end of copied text >    < from: CT Chest No Cont (19 @ 12:55) >    EXAM:  CT ABDOMEN AND PELVIS                          EXAM:  CT CHEST                            PROCEDURE DATE:  2019            INTERPRETATION:  CLINICAL INFORMATION: Cirrhosis. Recent fall. Assess for   traumatic injury.    COMPARISON: CT chest 10/26/2019, CT abdomen pelvis 10/23/2019    PROCEDURE:   CT of the Chest, Abdomen and Pelvis was performed without intravenous   contrast.   Intravenous contrast: None.  Oral contrast: None.  Sagittal and coronal reformats were performed.    FINDINGS:    CHEST:     LUNGS AND LARGE AIRWAYS: Patent central airways. No pulmonary nodules.  PLEURA: No pleural effusion.  VESSELS: Atherosclerotic changes of the aorta and coronary arteries.  HEART: Heart size is normal. No pericardial effusion.  MEDIASTINUM AND RONNIE: No lymphadenopathy.  CHEST WALL AND LOWER NECK: A new 1.5 cm rounded high density focus within   the left chest wall (3:22) from prior imaging 10/26/2019 may represent a   subcutaneous hematoma.    ABDOMEN AND PELVIS:    LIVER: Cirrhosis. Limited evaluation for focal lesion given lack of   intravenous contrast.  BILE DUCTS: Normal caliber.  GALLBLADDER: Within normal limits.  SPLEEN: Within normal limits.  PANCREAS: Within normal limits.  ADRENALS: Within normal limits.  KIDNEYS/URETERS: A 4 mm nonobstructing left renal calculus.    BLADDER: Within normal limits.  REPRODUCTIVE ORGANS: Prostate within normal limits.    BOWEL: No bowel obstruction. Appendix is normal.  PERITONEUM: No ascites.  VESSELS: Atherosclerotic changes.Upper abdominal varices.  RETROPERITONEUM/LYMPH NODES: No lymphadenopathy.    ABDOMINAL WALL: Within normal limits.  BONES: Degenerative changes.    IMPRESSION:     A new 1.5 cm rounded high density focus within the left chest wall from   prior imaging 10/26/2019 may represent a subcutaneous hematoma.   Otherwise, no evidence of acute traumatic injury.    Cirrhosis. No ascites.                          RICHARD PUCKETT M.D., ATTENDING RADIOLOGIST  This document has been electronically signed. 2019  1:19PM                < end of copied text >  < from: TTE Echo Complete w/Doppler (10.25.19 @ 09:11) >    EXAM:  ECHO TRANSTHORACIC COMP W DOPP      PROCEDURE DATE:  Oct 25 2019   .      INTERPRETATION:  REPORT:    TRANSTHORACIC ECHOCARDIOGRAM REPORT         Patient Name:   JESSICA MARTIN Patient Location: Inpatient  Medical Rec #:  [id number]     Accession #:      [id number]  Account #:                   Height:           71.7 in 182.0 cm  YOB: 1955    Weight:           361.6 lb 164.00 kg  Patient Age:    63 years     BSA:              2.73 m²  Patient Gender: M            BP:   112/65 mmHg       Date of Exam:        10/25/2019 9:11:20 AM  Sonographer:         Carole Isidro  Referring Physician: Candida Suresh MD    Procedure:     2D Echo/Doppler/Color Doppler Complete and Echocardiogram   with                 Definity Contrast.  Indications:   Cardiac murmur, unspecified - R01.1  Diagnosis:     Cardiac murmur, unspecified - R01.1  Study Details: Technically difficult study. Study quality was adversely   affected                 due to body habitus and patient obesity.         2D AND M-MODE MEASUREMENTS (normal ranges within parentheses):  Left                 Normal   Aorta/Left            Normal  Ventricle:                    Atrium:  IVSd (2D):    1.07  (0.7-1.1) Aortic Root  3.30 cm (2.4-3.7)                 cm             (2D):  LVPWd (2D):   1.02  (0.7-1.1) Left Atrium  4.13 cm (1.9-4.0)                 cm             (2D):  LVIDd (2D):   5.06  (3.4-5.7) LA Volume     32.0                 cm             Index         ml/m²  LVIDs (2D):   3.03Right Ventricle:                 cm             TAPSE:           2.48 cm  LV FS (2D):   40.1   (>25%)                  %  Relative Wall 0.40   (<0.42)  Thickness    LV DIASTOLIC FUNCTION:  MV Peak E: 1.47 m/s E/e' Ratio: 17.10  MV Peak A: 1.12 m/s Decel Time: 217 msec  E/A Ratio: 1.31    SPECTRAL DOPPLER ANALYSIS (where applicable):  Mitral Valve:  MV P1/2 Time: 62.80 msec  MV Area, PHT: 3.50 cm²    Aortic Valve: AoV Max Philip: 1.61 m/s AoV Peak PG: 10.3 mmHg AoV Mean P.4 mmHg    LVOT Vmax: 1.25 m/s LVOT VTI: 0.282 m LVOT Diameter: 2.22 cm    AoV Area, Vmax: 3.00 cm² AoV Area, VTI: 3.17 cm² AoV Area, Vmn: 2.97 cm²  Ao VTI: 0.345  Tricuspid Valve and PA/RV Systolic Pressure: TR Max Velocity: 2.96 m/s RA   Pressure: 8 mmHg RVSP/PASP: 43.0 mmHg       PHYSICIAN INTERPRETATION:  Left Ventricle: Endocardial visualization was enhanced with intravenous   echo contrast. The left ventricular internal cavity size is normal. Left   ventricular wall thickness is normal.  Global LV systolic function was normal. Left ventricular ejection   fraction, by visual estimation, is 65 to 70%.  Right Ventricle: The right ventricular size is mildly enlarged. RV   systolic function is normal.  Left Atrium: The left atrium is normal in size.  Right Atrium: Normal right atrial size.  Pericardium: There is no evidence of pericardial effusion. Mild ascites   is noted.  Mitral Valve: The mitral valve is normal in structure. Mild thickening   and calcification of the anterior and posterior mitral valve leaflets.   Mitral leaflet mobility is normal. There is mild mitral annular   calcification. Trace mitral valve regurgitation is seen.  Tricuspid Valve: The tricuspid valve is normal in structure. Mild   tricuspid regurgitation is visualized. Estimated pulmonary artery   systolic pressure is 43.0 mmHg assuming a right atrial pressure of 8   mmHg, which is consistent with mild pulmonary hypertension.  Aortic Valve: The aortic valve is trileaflet. Sclerotic aortic valve with   decreased opening. Peak transaortic gradient equals 10.3 mmHg, mean   transaortic gradient equals 5.4 mmHg, the calculated aortic valve area   equals 3.17 cm² by the continuity equation consistent with normally   opening aortic valve. No evidence of aortic valve regurgitation is seen.  Pulmonic Valve: The pulmonic valve is normal. Trace pulmonic valve   regurgitation.  Aorta: The aortic root is normal in size and structure.  Pulmonary Artery: The pulmonary artery is of normal size and origin.  Venous: A normal flow pattern is recorded from the right upper pulmonary   vein. The inferior vena cava was normal sized, with respiratory size   variation less than 50%.  In comparison to the previous echocardiogram(s): There are no prior   studies on this patient for comparison purposes.       Summary:   1. Technically difficult and limited study due to body habitus.   2. Endocardial visualization was enhanced with intravenous echo contrast.   3. Normal global left ventricular systolic function.   4. Left ventricular ejection fraction, by visual estimation, is 65 to   70%.   5. The left atrium is normal in size.   6. Sclerotic aortic valve with decreased opening.   7. Mild thickening and calcification of the anterior and posterior   mitral valve leaflets.   8. Mild mitral annular calcification.   9. Mild to moderate ascites.  10. Estimated pulmonary artery systolic pressure is 43.0 mmHg assuming a   right atrial pressure of 8 mmHg, which is consistent with mild pulmonary   hypertension.    MD Anitra Electronically signed on 10/25/2019 at 12:55:12 PM              *** Final ***                  SANYA PALOMARES   This document has been electronically signed. Oct 25 2019  9:11AM            < end of copied text >

## 2019-11-30 LAB
ALBUMIN SERPL ELPH-MCNC: 4 G/DL — SIGNIFICANT CHANGE UP (ref 3.3–5)
ALP SERPL-CCNC: 121 U/L — HIGH (ref 40–120)
ALT FLD-CCNC: 21 U/L — SIGNIFICANT CHANGE UP (ref 10–45)
ANION GAP SERPL CALC-SCNC: 15 MMOL/L — SIGNIFICANT CHANGE UP (ref 5–17)
ANION GAP SERPL CALC-SCNC: 19 MMOL/L — HIGH (ref 5–17)
ANION GAP SERPL CALC-SCNC: 20 MMOL/L — HIGH (ref 5–17)
APTT BLD: 41.6 SEC — HIGH (ref 27.5–36.3)
AST SERPL-CCNC: 31 U/L — SIGNIFICANT CHANGE UP (ref 10–40)
BILIRUB SERPL-MCNC: 8.2 MG/DL — HIGH (ref 0.2–1.2)
BUN SERPL-MCNC: 66 MG/DL — HIGH (ref 7–23)
BUN SERPL-MCNC: 68 MG/DL — HIGH (ref 7–23)
BUN SERPL-MCNC: 68 MG/DL — HIGH (ref 7–23)
CALCIUM SERPL-MCNC: 10.4 MG/DL — SIGNIFICANT CHANGE UP (ref 8.4–10.5)
CALCIUM SERPL-MCNC: 10.4 MG/DL — SIGNIFICANT CHANGE UP (ref 8.4–10.5)
CALCIUM SERPL-MCNC: 10.6 MG/DL — HIGH (ref 8.4–10.5)
CHLORIDE SERPL-SCNC: 87 MMOL/L — LOW (ref 96–108)
CHLORIDE SERPL-SCNC: 87 MMOL/L — LOW (ref 96–108)
CHLORIDE SERPL-SCNC: 89 MMOL/L — LOW (ref 96–108)
CO2 SERPL-SCNC: 26 MMOL/L — SIGNIFICANT CHANGE UP (ref 22–31)
CO2 SERPL-SCNC: 27 MMOL/L — SIGNIFICANT CHANGE UP (ref 22–31)
CO2 SERPL-SCNC: 27 MMOL/L — SIGNIFICANT CHANGE UP (ref 22–31)
CREAT SERPL-MCNC: 2.29 MG/DL — HIGH (ref 0.5–1.3)
CREAT SERPL-MCNC: 2.37 MG/DL — HIGH (ref 0.5–1.3)
CREAT SERPL-MCNC: 2.5 MG/DL — HIGH (ref 0.5–1.3)
CULTURE RESULTS: SIGNIFICANT CHANGE UP
GLUCOSE BLDC GLUCOMTR-MCNC: 122 MG/DL — HIGH (ref 70–99)
GLUCOSE BLDC GLUCOMTR-MCNC: 129 MG/DL — HIGH (ref 70–99)
GLUCOSE BLDC GLUCOMTR-MCNC: 149 MG/DL — HIGH (ref 70–99)
GLUCOSE BLDC GLUCOMTR-MCNC: 160 MG/DL — HIGH (ref 70–99)
GLUCOSE BLDC GLUCOMTR-MCNC: 164 MG/DL — HIGH (ref 70–99)
GLUCOSE SERPL-MCNC: 127 MG/DL — HIGH (ref 70–99)
GLUCOSE SERPL-MCNC: 164 MG/DL — HIGH (ref 70–99)
GLUCOSE SERPL-MCNC: 165 MG/DL — HIGH (ref 70–99)
HCT VFR BLD CALC: 26 % — LOW (ref 39–50)
HGB BLD-MCNC: 9.1 G/DL — LOW (ref 13–17)
INR BLD: 2.02 RATIO — HIGH (ref 0.88–1.16)
MCHC RBC-ENTMCNC: 33.7 PG — SIGNIFICANT CHANGE UP (ref 27–34)
MCHC RBC-ENTMCNC: 35 GM/DL — SIGNIFICANT CHANGE UP (ref 32–36)
MCV RBC AUTO: 96.3 FL — SIGNIFICANT CHANGE UP (ref 80–100)
NRBC # BLD: 0 /100 WBCS — SIGNIFICANT CHANGE UP (ref 0–0)
PLATELET # BLD AUTO: 51 K/UL — LOW (ref 150–400)
POTASSIUM SERPL-MCNC: 3.3 MMOL/L — LOW (ref 3.5–5.3)
POTASSIUM SERPL-MCNC: 3.5 MMOL/L — SIGNIFICANT CHANGE UP (ref 3.5–5.3)
POTASSIUM SERPL-MCNC: 3.5 MMOL/L — SIGNIFICANT CHANGE UP (ref 3.5–5.3)
POTASSIUM SERPL-SCNC: 3.3 MMOL/L — LOW (ref 3.5–5.3)
POTASSIUM SERPL-SCNC: 3.5 MMOL/L — SIGNIFICANT CHANGE UP (ref 3.5–5.3)
POTASSIUM SERPL-SCNC: 3.5 MMOL/L — SIGNIFICANT CHANGE UP (ref 3.5–5.3)
PROT SERPL-MCNC: 6.7 G/DL — SIGNIFICANT CHANGE UP (ref 6–8.3)
PROTHROM AB SERPL-ACNC: 23.5 SEC — HIGH (ref 10–12.9)
RBC # BLD: 2.7 M/UL — LOW (ref 4.2–5.8)
RBC # FLD: 19.7 % — HIGH (ref 10.3–14.5)
SODIUM SERPL-SCNC: 131 MMOL/L — LOW (ref 135–145)
SODIUM SERPL-SCNC: 133 MMOL/L — LOW (ref 135–145)
SODIUM SERPL-SCNC: 133 MMOL/L — LOW (ref 135–145)
SPECIMEN SOURCE: SIGNIFICANT CHANGE UP
WBC # BLD: 6.27 K/UL — SIGNIFICANT CHANGE UP (ref 3.8–10.5)
WBC # FLD AUTO: 6.27 K/UL — SIGNIFICANT CHANGE UP (ref 3.8–10.5)

## 2019-11-30 PROCEDURE — 99232 SBSQ HOSP IP/OBS MODERATE 35: CPT

## 2019-11-30 PROCEDURE — 93010 ELECTROCARDIOGRAM REPORT: CPT

## 2019-11-30 PROCEDURE — 70450 CT HEAD/BRAIN W/O DYE: CPT | Mod: 26

## 2019-11-30 RX ORDER — POTASSIUM CHLORIDE 20 MEQ
40 PACKET (EA) ORAL ONCE
Refills: 0 | Status: DISCONTINUED | OUTPATIENT
Start: 2019-11-30 | End: 2019-11-30

## 2019-11-30 RX ORDER — POTASSIUM CHLORIDE 20 MEQ
40 PACKET (EA) ORAL ONCE
Refills: 0 | Status: COMPLETED | OUTPATIENT
Start: 2019-11-30 | End: 2019-11-30

## 2019-11-30 RX ORDER — SODIUM CHLORIDE 9 MG/ML
500 INJECTION INTRAMUSCULAR; INTRAVENOUS; SUBCUTANEOUS ONCE
Refills: 0 | Status: COMPLETED | OUTPATIENT
Start: 2019-11-30 | End: 2019-11-30

## 2019-11-30 RX ADMIN — URSODIOL 300 MILLIGRAM(S): 250 TABLET, FILM COATED ORAL at 17:33

## 2019-11-30 RX ADMIN — LACTULOSE 30 GRAM(S): 10 SOLUTION ORAL at 11:36

## 2019-11-30 RX ADMIN — Medication 1 GRAM(S): at 17:33

## 2019-11-30 RX ADMIN — Medication 105 MILLIGRAM(S): at 13:33

## 2019-11-30 RX ADMIN — Medication 40 MILLIEQUIVALENT(S): at 17:33

## 2019-11-30 RX ADMIN — Medication 1 GRAM(S): at 05:34

## 2019-11-30 RX ADMIN — URSODIOL 300 MILLIGRAM(S): 250 TABLET, FILM COATED ORAL at 05:39

## 2019-11-30 RX ADMIN — Medication 50 MILLILITER(S): at 05:33

## 2019-11-30 RX ADMIN — Medication 105 MILLIGRAM(S): at 22:28

## 2019-11-30 RX ADMIN — Medication 1: at 09:02

## 2019-11-30 RX ADMIN — TAMSULOSIN HYDROCHLORIDE 0.4 MILLIGRAM(S): 0.4 CAPSULE ORAL at 22:26

## 2019-11-30 RX ADMIN — Medication 105 MILLIGRAM(S): at 05:33

## 2019-11-30 RX ADMIN — SODIUM CHLORIDE 500 MILLILITER(S): 9 INJECTION INTRAMUSCULAR; INTRAVENOUS; SUBCUTANEOUS at 03:22

## 2019-11-30 RX ADMIN — PANTOPRAZOLE SODIUM 40 MILLIGRAM(S): 20 TABLET, DELAYED RELEASE ORAL at 05:40

## 2019-11-30 RX ADMIN — Medication 1: at 12:46

## 2019-11-30 RX ADMIN — INSULIN GLARGINE 20 UNIT(S): 100 INJECTION, SOLUTION SUBCUTANEOUS at 22:26

## 2019-11-30 RX ADMIN — Medication 50 MILLILITER(S): at 11:36

## 2019-11-30 RX ADMIN — LACTULOSE 30 GRAM(S): 10 SOLUTION ORAL at 07:23

## 2019-11-30 RX ADMIN — ATORVASTATIN CALCIUM 10 MILLIGRAM(S): 80 TABLET, FILM COATED ORAL at 22:26

## 2019-11-30 NOTE — PROGRESS NOTE ADULT - SUBJECTIVE AND OBJECTIVE BOX
SUBJECTIVE / OVERNIGHT EVENTS: pt denies headache, nausea,v   chest pain, shortness of breath more confused than yesterday   + bms    MEDICATIONS  (STANDING):  atorvastatin 10 milliGRAM(s) Oral at bedtime  dextrose 5%. 1000 milliLiter(s) (50 mL/Hr) IV Continuous <Continuous>  dextrose 50% Injectable 12.5 Gram(s) IV Push once  dextrose 50% Injectable 25 Gram(s) IV Push once  dextrose 50% Injectable 25 Gram(s) IV Push once  insulin glargine Injectable (LANTUS) 20 Unit(s) SubCutaneous at bedtime  insulin lispro (HumaLOG) corrective regimen sliding scale   SubCutaneous three times a day before meals  insulin lispro (HumaLOG) corrective regimen sliding scale   SubCutaneous at bedtime  lactulose Syrup 30 Gram(s) Oral every 4 hours  pantoprazole    Tablet 40 milliGRAM(s) Oral before breakfast  rifAXIMin 550 milliGRAM(s) Oral two times a day  sucralfate 1 Gram(s) Oral two times a day  tamsulosin 0.4 milliGRAM(s) Oral at bedtime  thiamine IVPB 500 milliGRAM(s) IV Intermittent three times a day  ursodiol Capsule 300 milliGRAM(s) Oral two times a day    MEDICATIONS  (PRN):  dextrose 40% Gel 15 Gram(s) Oral once PRN Blood Glucose LESS THAN 70 milliGRAM(s)/deciliter  glucagon  Injectable 1 milliGRAM(s) IntraMuscular once PRN Glucose LESS THAN 70 milligrams/deciliter    Vital Signs Last 24 Hrs  T(C): 36.4 (2019 21:45), Max: 36.6 (2019 12:55)  T(F): 97.6 (2019 21:45), Max: 97.8 (2019 12:55)  HR: 75 (2019 21:45) (75 - 80)  BP: 107/62 (2019 21:45) (86/50 - 116/66)  BP(mean): --  RR: 17 (2019 21:45) (17 - 19)  SpO2: 98% (2019 21:45) (97% - 99%)    Constitutional: No fever, fatigue  Skin: No rash.  Eyes: No recent vision problems or eye pain.  ENT: No congestion, ear pain, or sore throat.  Cardiovascular: No chest pain or palpation.  Respiratory: No cough, shortness of breath, congestion, or wheezing.  Gastrointestinal: No abdominal pain, nausea, vomiting, or diarrhea.  Genitourinary: No dysuria.  Musculoskeletal: No joint swelling.  Neurologic: No headache.    PHYSICAL EXAM:  GENERAL: NAD  EYES: EOMI, PERRLA  NECK: Supple, No JVD  CHEST/LUNG: dec breath sounds at bases   HEART:  S1 , S2 +  ABDOMEN: soft , bs+, mild distension + , no tenderness  EXTREMITIES:  no edema  NEUROLOGY:alert awake oriented   flap tremor+    LABS:      131<L>  |  89<L>  |  66<H>  ----------------------------<  127<H>  3.5   |  27  |  2.50<H>    Ca    10.6<H>      2019 19:39    TPro  6.7  /  Alb  4.0  /  TBili  8.2<H>  /  DBili      /  AST  31  /  ALT  21  /  AlkPhos  121<H>      Creatinine Trend: 2.50 <--, 2.29 <--, 2.31 <--, 2.55 <--, 2.59 <--, 2.70 <--, 2.68 <--, 2.63 <--, 2.67 <--, 2.61 <--, 2.55 <--                        9.1    6.27  )-----------( 51       ( 2019 07:17 )             26.0     Urine Studies:  Urinalysis Basic - ( 2019 08:42 )    Color: Yellow / Appearance: Slightly Turbid / S.013 / pH:   Gluc:  / Ketone: Negative  / Bili: Negative / Urobili: <2 mg/dL   Blood:  / Protein: Trace / Nitrite: Negative   Leuk Esterase: Negative / RBC: 100 /HPF / WBC 4 /HPF   Sq Epi:  / Non Sq Epi: 0 /HPF / Bacteria: Negative      Osmolality, Random Urine: 335 mosm/Kg ( @ 00:20)  Creatinine, Random Urine: 67 mg/dL ( @ 23:21)  Creatinine, Random Urine: 68 mg/dL ( @ 21:19)  Protein/Creatinine Ratio Calculation: 0.2 Ratio ( @ 21:19)  Potassium, Random Urine: 37 mmol/L ( @ 21:19)  Sodium, Random Urine: 61 mmol/L ( @ 21:19)          LIVER FUNCTIONS - ( 2019 07:09 )  Alb: 4.0 g/dL / Pro: 6.7 g/dL / ALK PHOS: 121 U/L / ALT: 21 U/L / AST: 31 U/L / GGT: x           PT/INR - ( 2019 07:09 )   PT: 23.5 sec;   INR: 2.02 ratio         PTT - ( 2019 07:09 )  PTT:41.6 sec       PTT - ( 2019 08:22 )  PTT:37.6 sec  Color: Yellow / Appearance: Slightly Turbid / S.013 / pH:   Gluc:  / Ketone: Negative  / Bili: Negative / Urobili: <2 mg/dL   Blood:  / Protein: Trace / Nitrite: Negative   Leuk Esterase: Negative / RBC: 100 /HPF / WBC 4 /HPF   Sq Epi:  / Non Sq Epi: 0 /HPF / Bacteria: Negative      Osmolality, Random Urine: 335 mosm/Kg ( @ 00:20)  Creatinine, Random Urine: 67 mg/dL ( @ 23:21)  Creatinine, Random Urine: 68 mg/dL ( @ 21:19)  Protein/Creatinine Ratio Calculation: 0.2 Ratio ( @ 21:19)  Potassium, Random Urine: 37 mmol/L ( @ 21:19)  Sodium, Random Urine: 61 mmol/L ( @ 21:19)          LIVER FUNCTIONS - ( 2019 06:29 )  Alb: 3.0 g/dL / Pro: 6.9 g/dL / ALK PHOS: 182 U/L / ALT: 23 U/L / AST: 36 U/L / GGT: x           PT/INR - ( 2019 08:38 )   PT: 21.8 sec;   INR: 1.88 ratio         PTT - ( 2019 08:38 )  PTT:36.8 sec  Blood: x / Protein: Negative / Nitrite: Negative   Leuk Esterase: Negative / RBC: 14 /HPF / WBC 4 /HPF   Sq Epi: x / Non Sq Epi: 0 /HPF / Bacteria: Negative        RADIOLOGY & ADDITIONAL TESTS:    Imaging Personally Reviewed:    Consultant(s) Notes Reviewed:      Care Discussed with Consultants/Other Providers:

## 2019-11-30 NOTE — PROGRESS NOTE ADULT - PROBLEM SELECTOR PLAN 1
possible due to prerenal azotemia , hold lasix   increase water intake , low salt ,  gfr is improved   will f/u with dena gamez   Serum creatinine is 2.55   , approximating GFR is decreased    ml/min.   There is  progression . No uremic symptoms    pos evidence of anemia .  Fluid status stable.  Will continue to avoid nephrotoxic drugs.  Patient remains asymptomatic.   Continue current therapy.  hold diuresis hold arb   catie acei.

## 2019-11-30 NOTE — PROGRESS NOTE ADULT - SUBJECTIVE AND OBJECTIVE BOX
Patient is a 63y Male whom presented to the hospital with ckd and dena     PAST MEDICAL & SURGICAL HISTORY:  GIB (gastrointestinal bleeding)  GERD with esophagitis: Gastritis &amp; Non Bleeding Ulcers  Hepatic encephalopathy  Obesity  Fatty liver disease, nonalcoholic  Renal stones: 25 years ago  Hypertension  Neuropathy  Hypercholesteremia  Diabetes  S/P cholecystectomy      MEDICATIONS  (STANDING):  dextrose 5%. 1000 milliLiter(s) (50 mL/Hr) IV Continuous <Continuous>  dextrose 50% Injectable 12.5 Gram(s) IV Push once  dextrose 50% Injectable 25 Gram(s) IV Push once  dextrose 50% Injectable 25 Gram(s) IV Push once  insulin lispro (HumaLOG) corrective regimen sliding scale   SubCutaneous three times a day before meals  insulin lispro (HumaLOG) corrective regimen sliding scale   SubCutaneous at bedtime      Allergies    codeine (Anaphylaxis)    Intolerances    NO  RED MEAT (Unknown)      SOCIAL HISTORY:  Denies ETOh,Smoking,     FAMILY HISTORY:  Family history of type 2 diabetes mellitus  Family history of hypertension  Family history of stomach cancer      REVIEW OF SYSTEMS:    CONSTITUTIONAL: No weakness, fevers or chills  EYES/ENT: No visual changes;  no throat pain   NECK: No pain or stiffness  RESPIRATORY: No cough, wheezing, hemoptysis; No shortness of breath  CARDIOVASCULAR: No chest pain or palpitations  GASTROINTESTINAL: No abdominal or epigastric pain. No nausea, vomiting,     No diarrhea or constipation. No melena   GENITOURINARY: No dysuria, frequency or hematuria  NEUROLOGICAL: No numbness or weakness  SKIN: dry                                                9.1    6.27  )-----------( 51       ( 30 Nov 2019 07:17 )             26.0       CBC Full  -  ( 30 Nov 2019 07:17 )  WBC Count : 6.27 K/uL  RBC Count : 2.70 M/uL  Hemoglobin : 9.1 g/dL  Hematocrit : 26.0 %  Platelet Count - Automated : 51 K/uL  Mean Cell Volume : 96.3 fl  Mean Cell Hemoglobin : 33.7 pg  Mean Cell Hemoglobin Concentration : 35.0 gm/dL  Auto Neutrophil # : x  Auto Lymphocyte # : x  Auto Monocyte # : x  Auto Eosinophil # : x  Auto Basophil # : x  Auto Neutrophil % : x  Auto Lymphocyte % : x  Auto Monocyte % : x  Auto Eosinophil % : x  Auto Basophil % : x      11-30    133<L>  |  87<L>  |  68<H>  ----------------------------<  164<H>  3.3<L>   |  27  |  2.29<H>    Ca    10.4      30 Nov 2019 07:09    TPro  6.7  /  Alb  4.0  /  TBili  8.2<H>  /  DBili  x   /  AST  31  /  ALT  21  /  AlkPhos  121<H>  11-30      CAPILLARY BLOOD GLUCOSE      POCT Blood Glucose.: 160 mg/dL (30 Nov 2019 12:35)  POCT Blood Glucose.: 164 mg/dL (30 Nov 2019 08:39)  POCT Blood Glucose.: 149 mg/dL (30 Nov 2019 03:08)  POCT Blood Glucose.: 173 mg/dL (29 Nov 2019 21:49)  POCT Blood Glucose.: 143 mg/dL (29 Nov 2019 17:32)      Vital Signs Last 24 Hrs  T(C): 36.6 (30 Nov 2019 12:55), Max: 36.6 (30 Nov 2019 12:55)  T(F): 97.8 (30 Nov 2019 12:55), Max: 97.8 (30 Nov 2019 12:55)  HR: 80 (30 Nov 2019 13:32) (76 - 80)  BP: 116/66 (30 Nov 2019 13:32) (86/50 - 116/66)  BP(mean): --  RR: 17 (30 Nov 2019 12:55) (17 - 19)  SpO2: 98% (30 Nov 2019 12:55) (97% - 99%)        PT/INR - ( 30 Nov 2019 07:09 )   PT: 23.5 sec;   INR: 2.02 ratio         PTT - ( 30 Nov 2019 07:09 )  PTT:41.6 sec                   PHYSICAL EXAM:    Constitutional: NAD  HEENT: conjunctive   clear   Neck:  No JVD  Respiratory: CTAB  Cardiovascular: S1 and S2  Gastrointestinal: BS+, soft, NT/ND  Extremities: No peripheral edema  Neurological: A/O x 3, no focal deficits  Psychiatric: Normal mood, normal affect  : No Beasley  Skin: No rashes  Access: Not applicable

## 2019-11-30 NOTE — PROGRESS NOTE ADULT - PROBLEM SELECTOR PLAN 1
- likely hepatorenal syndrome; renal input appreciated  - titrate lactulose for 3-4 BM/day; please give Lactulose Enemas if pt unable to tolerate PO intake vs NGT placement for Lactulose  - continue xifaxan bid   - ID/Hepatology/Renal input noted and appreciated; cont to follow recs  - fu palliative recs  - MELD 31 today, 11/30

## 2019-11-30 NOTE — PROGRESS NOTE ADULT - ASSESSMENT
63 year old male with HTN, DM2, non-alcoholic cirrhosis, who presents with altered mental status. We last saw him during a hospitalization in 10/2019, during which he was significantly volume overloaded and required iv diuresis.  He is confused this morning and is unable to give an interval history. His hyponatremia is improving, though his ammonia remans high.    Abnormal EKG  - EKG is notable for prolonged qtc which is still present on 11/30  - avoid qtc prolonging medications  - replete K  - check daily mg as well  - Monitor and replete electrolytes. Keep K>4.0 and Mg>2.0.   - repeat daily EKG    Diastolic HF   - no sign of acute ischemia  - no significant volume overload on exam. CT without pleural effusions or edema, though notable for possible chest wall hematoma.  - can hold diuretics in the short term, though remains with hyponatremia and altered mental status  - GI and renal follow up.  - TTE normal LV systolic function EF 65%; LVH, DD Grade I    HTN  - BP on softer side  - hold diuretics and anti-hypertensives  - when able start BB    - cont statin for cad/atherosclerosis noted on ct  - lfts remain within normal limits    - Watch creatinine and electrolytes. Keep K>4, mg>2  - All other workup as per primary team  - Will follow with you. Further cardiac w/u as indicated by clinical course

## 2019-11-30 NOTE — PROGRESS NOTE ADULT - SUBJECTIVE AND OBJECTIVE BOX
INTERVAL HPI/OVERNIGHT EVENTS:    seen this am and spoke with family bedside   pt mentating better this morning; able to communicate in full sentences and track well    co full body aches and "shakes"    MEDICATIONS  (STANDING):  albumin human 25% IVPB 100 milliLiter(s) IV Intermittent every 6 hours  atorvastatin 10 milliGRAM(s) Oral at bedtime  dextrose 5%. 1000 milliLiter(s) (50 mL/Hr) IV Continuous <Continuous>  dextrose 50% Injectable 12.5 Gram(s) IV Push once  dextrose 50% Injectable 25 Gram(s) IV Push once  dextrose 50% Injectable 25 Gram(s) IV Push once  insulin glargine Injectable (LANTUS) 20 Unit(s) SubCutaneous at bedtime  insulin lispro (HumaLOG) corrective regimen sliding scale   SubCutaneous three times a day before meals  insulin lispro (HumaLOG) corrective regimen sliding scale   SubCutaneous at bedtime  lactulose Syrup 30 Gram(s) Oral every 4 hours  pantoprazole    Tablet 40 milliGRAM(s) Oral before breakfast  rifAXIMin 550 milliGRAM(s) Oral two times a day  sucralfate 1 Gram(s) Oral two times a day  tamsulosin 0.4 milliGRAM(s) Oral at bedtime  thiamine IVPB 500 milliGRAM(s) IV Intermittent three times a day  ursodiol Capsule 300 milliGRAM(s) Oral two times a day    MEDICATIONS  (PRN):  dextrose 40% Gel 15 Gram(s) Oral once PRN Blood Glucose LESS THAN 70 milliGRAM(s)/deciliter  glucagon  Injectable 1 milliGRAM(s) IntraMuscular once PRN Glucose LESS THAN 70 milligrams/deciliter      Allergies    codeine (Anaphylaxis)    Intolerances    NO  RED MEAT (Unknown)      Review of Systems:    General:  No wt loss, fevers, chills, night sweats, fatigue   Eyes:  Good vision, no reported pain  ENT:  No sore throat, pain, runny nose, dysphagia  CV:  No pain, palpitations, hypo/hypertension  Resp:  No dyspnea, cough, tachypnea, wheezing  GI:  No pain, No nausea, No vomiting, No diarrhea, No constipation, No weight loss, No fever, No pruritis, No rectal bleeding, No melena, No dysphagia  :  No pain, bleeding, incontinence, nocturia  Muscle:  No pain, weakness  Neuro:  No weakness, tingling, memory problems  Psych:  No fatigue, insomnia, mood problems, depression  Endocrine:  No polyuria, polydypsia, cold/heat intolerance  Heme:  No petechiae, ecchymosis, easy bruisability  Skin:  No rash, tattoos, scars, edema      Vital Signs Last 24 Hrs  T(C): 36.3 (30 Nov 2019 04:43), Max: 36.5 (30 Nov 2019 02:29)  T(F): 97.4 (30 Nov 2019 04:43), Max: 97.7 (30 Nov 2019 02:29)  HR: 78 (30 Nov 2019 04:43) (77 - 88)  BP: 101/56 (30 Nov 2019 04:43) (86/50 - 115/65)  BP(mean): --  RR: 17 (30 Nov 2019 04:43) (17 - 19)  SpO2: 97% (30 Nov 2019 04:43) (97% - 100%)    PHYSICAL EXAM:    Constitutional: NAD  HEENT: EOMI, throat clear  Neck: No LAD, supple  Respiratory: CTA and P  Cardiovascular: S1 and S2, RRR, no M  Gastrointestinal: BS+, soft, NT/ND, neg HSM,  Extremities: No peripheral edema, neg clubbing, cyanosis +tremor  Vascular: 2+ peripheral pulses  Neurological: A/O x 3, no focal deficits  Psychiatric: Normal mood, normal affect  Skin: No rashes +jaundice      LABS:                        9.1    6.27  )-----------( 51       ( 30 Nov 2019 07:17 )             26.0     11-30    133<L>  |  87<L>  |  68<H>  ----------------------------<  164<H>  3.3<L>   |  27  |  2.29<H>    Ca    10.4      30 Nov 2019 07:09    TPro  6.7  /  Alb  4.0  /  TBili  8.2<H>  /  DBili  x   /  AST  31  /  ALT  21  /  AlkPhos  121<H>  11-30    PT/INR - ( 30 Nov 2019 07:09 )   PT: 23.5 sec;   INR: 2.02 ratio         PTT - ( 30 Nov 2019 07:09 )  PTT:41.6 sec      RADIOLOGY & ADDITIONAL TESTS:

## 2019-11-30 NOTE — PROGRESS NOTE ADULT - SUBJECTIVE AND OBJECTIVE BOX
[facility] -- Sushila Dhaliwal, Morenita Camacho Pannella, Patel, Savella  Office # [phone]      Follow Up:  abn ekg    Subjective/Observations: Patient seen and examined. Events noted. Resting comfortably in bed. No complaints of chest pain, dyspnea, or palpitations reported. No signs of orthopnea or PND.       REVIEW OF SYSTEMS: All other review of systems is negative unless indicated above    PAST MEDICAL & SURGICAL HISTORY:  GIB (gastrointestinal bleeding)  GERD with esophagitis: Gastritis &amp; Non Bleeding Ulcers  Hepatic encephalopathy  Obesity  Fatty liver disease, nonalcoholic  Renal stones: 25 years ago  Hypertension  Neuropathy  Hypercholesteremia  Diabetes  S/P cholecystectomy      MEDICATIONS  (STANDING):  atorvastatin 10 milliGRAM(s) Oral at bedtime  dextrose 5%. 1000 milliLiter(s) (50 mL/Hr) IV Continuous <Continuous>  dextrose 50% Injectable 12.5 Gram(s) IV Push once  dextrose 50% Injectable 25 Gram(s) IV Push once  dextrose 50% Injectable 25 Gram(s) IV Push once  insulin glargine Injectable (LANTUS) 20 Unit(s) SubCutaneous at bedtime  insulin lispro (HumaLOG) corrective regimen sliding scale   SubCutaneous three times a day before meals  insulin lispro (HumaLOG) corrective regimen sliding scale   SubCutaneous at bedtime  lactulose Syrup 30 Gram(s) Oral every 4 hours  pantoprazole    Tablet 40 milliGRAM(s) Oral before breakfast  potassium chloride    Tablet ER 40 milliEquivalent(s) Oral once  rifAXIMin 550 milliGRAM(s) Oral two times a day  sucralfate 1 Gram(s) Oral two times a day  tamsulosin 0.4 milliGRAM(s) Oral at bedtime  thiamine IVPB 500 milliGRAM(s) IV Intermittent three times a day  ursodiol Capsule 300 milliGRAM(s) Oral two times a day    MEDICATIONS  (PRN):  dextrose 40% Gel 15 Gram(s) Oral once PRN Blood Glucose LESS THAN 70 milliGRAM(s)/deciliter  glucagon  Injectable 1 milliGRAM(s) IntraMuscular once PRN Glucose LESS THAN 70 milligrams/deciliter      Allergies    codeine (Anaphylaxis)    Intolerances    NO  RED MEAT (Unknown)          Vital Signs Last 24 Hrs  T(C): 36.6 (30 Nov 2019 12:55), Max: 36.6 (30 Nov 2019 12:55)  T(F): 97.8 (30 Nov 2019 12:55), Max: 97.8 (30 Nov 2019 12:55)  HR: 80 (30 Nov 2019 13:32) (76 - 80)  BP: 116/66 (30 Nov 2019 13:32) (86/50 - 116/66)  BP(mean): --  RR: 17 (30 Nov 2019 12:55) (17 - 19)  SpO2: 98% (30 Nov 2019 12:55) (97% - 99%)    I&O's Summary    29 Nov 2019 07:01  -  30 Nov 2019 07:00  --------------------------------------------------------  IN: 80 mL / OUT: 1150 mL / NET: -1070 mL    30 Nov 2019 07:01  -  30 Nov 2019 17:31  --------------------------------------------------------  IN: 1000 mL / OUT: 600 mL / NET: 400 mL          PHYSICAL EXAM:     Constitutional: NAD, awake and alert, well-developed  HEENT: Moist Mucous Membranes, Anicteric  Pulmonary: Non-labored, breath sounds are clear bilaterally, No wheezing, rales or rhonchi  Cardiovascular: Regular, S1 and S2, No murmurs, rubs, gallops or clicks  Gastrointestinal: Bowel Sounds present, soft, nontender.   Lymph: No peripheral edema. No lymphadenopathy.  Skin: No visible rashes or ulcers.  Psych:  Mood & affect appropriate for situation    LABS: All Labs Reviewed:                        9.1    6.27  )-----------( 51       ( 30 Nov 2019 07:17 )             26.0                         10.5   7.07  )-----------( 71       ( 29 Nov 2019 08:30 )             29.5                         10.2   6.18  )-----------( 67       ( 28 Nov 2019 08:52 )             29.1     30 Nov 2019 07:09    133    |  87     |  68     ----------------------------<  164    3.3     |  27     |  2.29   29 Nov 2019 19:30    134    |  87     |  69     ----------------------------<  174    3.5     |  27     |  2.31   29 Nov 2019 07:06    134    |  86     |  68     ----------------------------<  167    3.1     |  28     |  2.55     Ca    10.4       30 Nov 2019 07:09  Ca    11.0       29 Nov 2019 19:30  Ca    11.0       29 Nov 2019 07:06    TPro  6.7    /  Alb  4.0    /  TBili  8.2    /  DBili  x      /  AST  31     /  ALT  21     /  AlkPhos  121    30 Nov 2019 07:09  TPro  7.5    /  Alb  3.9    /  TBili  8.9    /  DBili  x      /  AST  37     /  ALT  24     /  AlkPhos  160    29 Nov 2019 07:06  TPro  6.9    /  Alb  3.0    /  TBili  8.1    /  DBili  x      /  AST  36     /  ALT  23     /  AlkPhos  182    28 Nov 2019 06:29    PT/INR - ( 30 Nov 2019 07:09 )   PT: 23.5 sec;   INR: 2.02 ratio         PTT - ( 30 Nov 2019 07:09 )  PTT:41.6 sec

## 2019-12-01 LAB
ALBUMIN SERPL ELPH-MCNC: 4 G/DL — SIGNIFICANT CHANGE UP (ref 3.3–5)
ALP SERPL-CCNC: 123 U/L — HIGH (ref 40–120)
ALT FLD-CCNC: 21 U/L — SIGNIFICANT CHANGE UP (ref 10–45)
ANION GAP SERPL CALC-SCNC: 16 MMOL/L — SIGNIFICANT CHANGE UP (ref 5–17)
AST SERPL-CCNC: 35 U/L — SIGNIFICANT CHANGE UP (ref 10–40)
BILIRUB SERPL-MCNC: 8.7 MG/DL — HIGH (ref 0.2–1.2)
BUN SERPL-MCNC: 67 MG/DL — HIGH (ref 7–23)
CALCIUM SERPL-MCNC: 10.5 MG/DL — SIGNIFICANT CHANGE UP (ref 8.4–10.5)
CHLORIDE SERPL-SCNC: 88 MMOL/L — LOW (ref 96–108)
CO2 SERPL-SCNC: 27 MMOL/L — SIGNIFICANT CHANGE UP (ref 22–31)
CREAT SERPL-MCNC: 2.47 MG/DL — HIGH (ref 0.5–1.3)
GLUCOSE BLDC GLUCOMTR-MCNC: 103 MG/DL — HIGH (ref 70–99)
GLUCOSE BLDC GLUCOMTR-MCNC: 122 MG/DL — HIGH (ref 70–99)
GLUCOSE BLDC GLUCOMTR-MCNC: 190 MG/DL — HIGH (ref 70–99)
GLUCOSE BLDC GLUCOMTR-MCNC: 206 MG/DL — HIGH (ref 70–99)
GLUCOSE SERPL-MCNC: 102 MG/DL — HIGH (ref 70–99)
HCT VFR BLD CALC: 26.9 % — LOW (ref 39–50)
HGB BLD-MCNC: 9.5 G/DL — LOW (ref 13–17)
INR BLD: 1.92 RATIO — HIGH (ref 0.88–1.16)
MCHC RBC-ENTMCNC: 33.9 PG — SIGNIFICANT CHANGE UP (ref 27–34)
MCHC RBC-ENTMCNC: 35.3 GM/DL — SIGNIFICANT CHANGE UP (ref 32–36)
MCV RBC AUTO: 96.1 FL — SIGNIFICANT CHANGE UP (ref 80–100)
NRBC # BLD: 0 /100 WBCS — SIGNIFICANT CHANGE UP (ref 0–0)
PLATELET # BLD AUTO: 62 K/UL — LOW (ref 150–400)
POTASSIUM SERPL-MCNC: 3.7 MMOL/L — SIGNIFICANT CHANGE UP (ref 3.5–5.3)
POTASSIUM SERPL-SCNC: 3.7 MMOL/L — SIGNIFICANT CHANGE UP (ref 3.5–5.3)
PROT SERPL-MCNC: 6.9 G/DL — SIGNIFICANT CHANGE UP (ref 6–8.3)
PROTHROM AB SERPL-ACNC: 22.5 SEC — HIGH (ref 10–12.9)
RBC # BLD: 2.8 M/UL — LOW (ref 4.2–5.8)
RBC # FLD: 19.4 % — HIGH (ref 10.3–14.5)
SODIUM SERPL-SCNC: 131 MMOL/L — LOW (ref 135–145)
WBC # BLD: 6.95 K/UL — SIGNIFICANT CHANGE UP (ref 3.8–10.5)
WBC # FLD AUTO: 6.95 K/UL — SIGNIFICANT CHANGE UP (ref 3.8–10.5)

## 2019-12-01 PROCEDURE — 99232 SBSQ HOSP IP/OBS MODERATE 35: CPT

## 2019-12-01 RX ADMIN — Medication 1 GRAM(S): at 17:51

## 2019-12-01 RX ADMIN — PANTOPRAZOLE SODIUM 40 MILLIGRAM(S): 20 TABLET, DELAYED RELEASE ORAL at 05:28

## 2019-12-01 RX ADMIN — TAMSULOSIN HYDROCHLORIDE 0.4 MILLIGRAM(S): 0.4 CAPSULE ORAL at 21:32

## 2019-12-01 RX ADMIN — Medication 105 MILLIGRAM(S): at 16:02

## 2019-12-01 RX ADMIN — LACTULOSE 30 GRAM(S): 10 SOLUTION ORAL at 15:42

## 2019-12-01 RX ADMIN — Medication 105 MILLIGRAM(S): at 21:32

## 2019-12-01 RX ADMIN — Medication 105 MILLIGRAM(S): at 05:29

## 2019-12-01 RX ADMIN — URSODIOL 300 MILLIGRAM(S): 250 TABLET, FILM COATED ORAL at 05:29

## 2019-12-01 RX ADMIN — ATORVASTATIN CALCIUM 10 MILLIGRAM(S): 80 TABLET, FILM COATED ORAL at 21:32

## 2019-12-01 RX ADMIN — LACTULOSE 30 GRAM(S): 10 SOLUTION ORAL at 09:58

## 2019-12-01 RX ADMIN — INSULIN GLARGINE 20 UNIT(S): 100 INJECTION, SOLUTION SUBCUTANEOUS at 21:42

## 2019-12-01 RX ADMIN — URSODIOL 300 MILLIGRAM(S): 250 TABLET, FILM COATED ORAL at 17:51

## 2019-12-01 RX ADMIN — Medication 1 GRAM(S): at 05:29

## 2019-12-01 RX ADMIN — Medication 2: at 17:51

## 2019-12-01 NOTE — PHYSICAL THERAPY INITIAL EVALUATION ADULT - ADDITIONAL COMMENTS
pt admitted from rehab this time, prior to his first hospitalization, pt resided with his family in a pvt house, 3STE, used RW for functional ambulation.

## 2019-12-01 NOTE — PHYSICAL THERAPY INITIAL EVALUATION ADULT - STRENGTHENING, PT EVAL
pt will demonstrate good strength in BLE/BUEs to improve limb stability during ADls/mobility in 4weeks

## 2019-12-01 NOTE — PROGRESS NOTE ADULT - PROBLEM SELECTOR PLAN 2
- recent EGD 5/2019 non-bleeding duodenal ulcers and No evidence of varices   - cirrhosis 2/2 NAFLD   - US Abd without ascites; CT abd without new gi pathology; no liver lesions  - continue xifaxan and lactulose as above   - Protonix 40mg PO QD   - hepatology input was appreciated

## 2019-12-01 NOTE — PROGRESS NOTE ADULT - SUBJECTIVE AND OBJECTIVE BOX
[facility] -- Sushila Dhaliwal, Morenita Camacho Pannella, Patel, Savella  Office # [phone]      Follow Up:  abn EKG    Subjective/Observations: Patient seen and examined. Events noted. Resting comfortably in bed. No complaints of chest pain, dyspnea, or palpitations reported. No signs of orthopnea or PND.       REVIEW OF SYSTEMS: All other review of systems is negative unless indicated above    PAST MEDICAL & SURGICAL HISTORY:  GIB (gastrointestinal bleeding)  GERD with esophagitis: Gastritis &amp; Non Bleeding Ulcers  Hepatic encephalopathy  Obesity  Fatty liver disease, nonalcoholic  Renal stones: 25 years ago  Hypertension  Neuropathy  Hypercholesteremia  Diabetes  S/P cholecystectomy      MEDICATIONS  (STANDING):  atorvastatin 10 milliGRAM(s) Oral at bedtime  dextrose 5%. 1000 milliLiter(s) (50 mL/Hr) IV Continuous <Continuous>  dextrose 50% Injectable 12.5 Gram(s) IV Push once  dextrose 50% Injectable 25 Gram(s) IV Push once  dextrose 50% Injectable 25 Gram(s) IV Push once  insulin glargine Injectable (LANTUS) 20 Unit(s) SubCutaneous at bedtime  insulin lispro (HumaLOG) corrective regimen sliding scale   SubCutaneous three times a day before meals  insulin lispro (HumaLOG) corrective regimen sliding scale   SubCutaneous at bedtime  lactulose Syrup 30 Gram(s) Oral every 4 hours  pantoprazole    Tablet 40 milliGRAM(s) Oral before breakfast  rifAXIMin 550 milliGRAM(s) Oral two times a day  sucralfate 1 Gram(s) Oral two times a day  tamsulosin 0.4 milliGRAM(s) Oral at bedtime  thiamine IVPB 500 milliGRAM(s) IV Intermittent three times a day  ursodiol Capsule 300 milliGRAM(s) Oral two times a day    MEDICATIONS  (PRN):  dextrose 40% Gel 15 Gram(s) Oral once PRN Blood Glucose LESS THAN 70 milliGRAM(s)/deciliter  glucagon  Injectable 1 milliGRAM(s) IntraMuscular once PRN Glucose LESS THAN 70 milligrams/deciliter      Allergies    codeine (Anaphylaxis)    Intolerances    NO  RED MEAT (Unknown)          Vital Signs Last 24 Hrs  T(C): 36.5 (01 Dec 2019 04:36), Max: 36.6 (30 Nov 2019 12:55)  T(F): 97.7 (01 Dec 2019 04:36), Max: 97.8 (30 Nov 2019 12:55)  HR: 81 (01 Dec 2019 04:36) (75 - 81)  BP: 113/57 (01 Dec 2019 04:36) (107/62 - 116/66)  BP(mean): --  RR: 18 (01 Dec 2019 04:36) (17 - 18)  SpO2: 98% (01 Dec 2019 04:36) (98% - 98%)    I&O's Summary    30 Nov 2019 07:01  -  01 Dec 2019 07:00  --------------------------------------------------------  IN: 2460 mL / OUT: 1650 mL / NET: 810 mL          PHYSICAL EXAM:    Constitutional: NAD, awake and alert, well-developed  HEENT: Moist Mucous Membranes, Anicteric  Pulmonary: Decreased breath sounds b/l. No rales, crackles or wheeze appreciated.   Cardiovascular: Regular, S1 and S2, No murmurs, rubs, gallops or clicks  Gastrointestinal: Bowel Sounds present, soft, nontender.   Lymph: No peripheral edema. No lymphadenopathy.  Skin: No visible rashes or ulcers.  Psych:  Mood & affect appropriate for situation    LABS: All Labs Reviewed:                        9.5    6.95  )-----------( 62       ( 01 Dec 2019 06:13 )             26.9                         9.1    6.27  )-----------( 51       ( 30 Nov 2019 07:17 )             26.0                         10.5   7.07  )-----------( 71       ( 29 Nov 2019 08:30 )             29.5     01 Dec 2019 06:13    131    |  88     |  67     ----------------------------<  102    3.7     |  27     |  2.47   30 Nov 2019 19:39    131    |  89     |  66     ----------------------------<  127    3.5     |  27     |  2.50   30 Nov 2019 07:09    133    |  87     |  68     ----------------------------<  164    3.3     |  27     |  2.29     Ca    10.5       01 Dec 2019 06:13  Ca    10.6       30 Nov 2019 19:39  Ca    10.4       30 Nov 2019 07:09    TPro  6.9    /  Alb  4.0    /  TBili  8.7    /  DBili  x      /  AST  35     /  ALT  21     /  AlkPhos  123    01 Dec 2019 06:13  TPro  6.7    /  Alb  4.0    /  TBili  8.2    /  DBili  x      /  AST  31     /  ALT  21     /  AlkPhos  121    30 Nov 2019 07:09  TPro  7.5    /  Alb  3.9    /  TBili  8.9    /  DBili  x      /  AST  37     /  ALT  24     /  AlkPhos  160    29 Nov 2019 07:06    PT/INR - ( 01 Dec 2019 06:13 )   PT: 22.5 sec;   INR: 1.92 ratio         PTT - ( 30 Nov 2019 07:09 )  PTT:41.6 sec

## 2019-12-01 NOTE — PHYSICAL THERAPY INITIAL EVALUATION ADULT - PERTINENT HX OF CURRENT PROBLEM, REHAB EVAL
63M w/ pmh HTN, HLD, DM, non-alcoholic cirrhosis, chronic thrombocytopenia -- sent from rehab for worsening jaundice, dizzy/weakness/falls x 4 days. Discharged to rehab after last hospitalization. No fever, n/v/d/c, chest / abd pain, cough, sob, dysuria/hematuria. recently admitted at OSH for sepsis, hypoglycemia, cirrhosis (lactulose, xifaxan), hepatic encephalopathy, GI bleed; G+ bacteremia, EGD, last bm yesterday morning. found to have worsening jaundice  / hyponatremia.

## 2019-12-01 NOTE — PHYSICAL THERAPY INITIAL EVALUATION ADULT - PRECAUTIONS/LIMITATIONS, REHAB EVAL
Lab shows decreased h/h, Increased INR, CT chest: Moderate bilateral pleural effusions with associated bilateral lower lobe compressive atelectasis, Cirrhotic liver with upper abdominal ascites./fall precautions

## 2019-12-01 NOTE — PROGRESS NOTE ADULT - SUBJECTIVE AND OBJECTIVE BOX
INTERVAL HPI/OVERNIGHT EVENTS:    doing well this morning; mentating much better  no abdominal pain   no n/v  tolerating po intake  (+) BM this am and multiple yesterday    MEDICATIONS  (STANDING):  atorvastatin 10 milliGRAM(s) Oral at bedtime  dextrose 5%. 1000 milliLiter(s) (50 mL/Hr) IV Continuous <Continuous>  dextrose 50% Injectable 12.5 Gram(s) IV Push once  dextrose 50% Injectable 25 Gram(s) IV Push once  dextrose 50% Injectable 25 Gram(s) IV Push once  insulin glargine Injectable (LANTUS) 20 Unit(s) SubCutaneous at bedtime  insulin lispro (HumaLOG) corrective regimen sliding scale   SubCutaneous three times a day before meals  insulin lispro (HumaLOG) corrective regimen sliding scale   SubCutaneous at bedtime  lactulose Syrup 30 Gram(s) Oral every 4 hours  pantoprazole    Tablet 40 milliGRAM(s) Oral before breakfast  rifAXIMin 550 milliGRAM(s) Oral two times a day  sucralfate 1 Gram(s) Oral two times a day  tamsulosin 0.4 milliGRAM(s) Oral at bedtime  thiamine IVPB 500 milliGRAM(s) IV Intermittent three times a day  ursodiol Capsule 300 milliGRAM(s) Oral two times a day    MEDICATIONS  (PRN):  dextrose 40% Gel 15 Gram(s) Oral once PRN Blood Glucose LESS THAN 70 milliGRAM(s)/deciliter  glucagon  Injectable 1 milliGRAM(s) IntraMuscular once PRN Glucose LESS THAN 70 milligrams/deciliter      Allergies    codeine (Anaphylaxis)    Intolerances    NO  RED MEAT (Unknown)      Review of Systems:    General:  No wt loss, fevers, chills, night sweats, fatigue   Eyes:  Good vision, no reported pain  ENT:  No sore throat, pain, runny nose, dysphagia  CV:  No pain, palpitations, hypo/hypertension  Resp:  No dyspnea, cough, tachypnea, wheezing  GI:  No pain, No nausea, No vomiting, No diarrhea, No constipation, No weight loss, No fever, No pruritis, No rectal bleeding, No melena, No dysphagia  :  No pain, bleeding, incontinence, nocturia  Muscle:  No pain, weakness  Neuro:  No weakness, tingling, memory problems  Psych:  No fatigue, insomnia, mood problems, depression  Endocrine:  No polyuria, polydypsia, cold/heat intolerance  Heme:  No petechiae, ecchymosis, easy bruisability  Skin:  No rash, tattoos, scars, edema      Vital Signs Last 24 Hrs  T(C): 36.5 (01 Dec 2019 04:36), Max: 36.6 (30 Nov 2019 12:55)  T(F): 97.7 (01 Dec 2019 04:36), Max: 97.8 (30 Nov 2019 12:55)  HR: 81 (01 Dec 2019 04:36) (75 - 81)  BP: 113/57 (01 Dec 2019 04:36) (107/62 - 116/66)  BP(mean): --  RR: 18 (01 Dec 2019 04:36) (17 - 18)  SpO2: 98% (01 Dec 2019 04:36) (98% - 98%)    PHYSICAL EXAM:    Constitutional: NAD  HEENT: EOMI, throat clear  Neck: No LAD, supple  Respiratory: CTA and P  Cardiovascular: S1 and S2, RRR, no M  Gastrointestinal: BS+, soft, NT/ND, neg HSM,  Extremities: No peripheral edema, neg clubbing, cyanosis  Vascular: 2+ peripheral pulses  Neurological: A/O x 3, no focal deficits  Psychiatric: Normal mood, normal affect  Skin: No rashes +icterus and jaundice      LABS:                        9.5    6.95  )-----------( 62       ( 01 Dec 2019 06:13 )             26.9     12-01    131<L>  |  88<L>  |  67<H>  ----------------------------<  102<H>  3.7   |  27  |  2.47<H>    Ca    10.5      01 Dec 2019 06:13    TPro  6.9  /  Alb  4.0  /  TBili  8.7<H>  /  DBili  x   /  AST  35  /  ALT  21  /  AlkPhos  123<H>  12-01    PT/INR - ( 01 Dec 2019 06:13 )   PT: 22.5 sec;   INR: 1.92 ratio         PTT - ( 30 Nov 2019 07:09 )  PTT:41.6 sec      RADIOLOGY & ADDITIONAL TESTS:

## 2019-12-01 NOTE — PROGRESS NOTE ADULT - SUBJECTIVE AND OBJECTIVE BOX
Patient is a 63y Male whom presented to the hospital with ckd and dena     PAST MEDICAL & SURGICAL HISTORY:  GIB (gastrointestinal bleeding)  GERD with esophagitis: Gastritis &amp; Non Bleeding Ulcers  Hepatic encephalopathy  Obesity  Fatty liver disease, nonalcoholic  Renal stones: 25 years ago  Hypertension  Neuropathy  Hypercholesteremia  Diabetes  S/P cholecystectomy      MEDICATIONS  (STANDING):  dextrose 5%. 1000 milliLiter(s) (50 mL/Hr) IV Continuous <Continuous>  dextrose 50% Injectable 12.5 Gram(s) IV Push once  dextrose 50% Injectable 25 Gram(s) IV Push once  dextrose 50% Injectable 25 Gram(s) IV Push once  insulin lispro (HumaLOG) corrective regimen sliding scale   SubCutaneous three times a day before meals  insulin lispro (HumaLOG) corrective regimen sliding scale   SubCutaneous at bedtime      Allergies    codeine (Anaphylaxis)    Intolerances    NO  RED MEAT (Unknown)      SOCIAL HISTORY:  Denies ETOh,Smoking,     FAMILY HISTORY:  Family history of type 2 diabetes mellitus  Family history of hypertension  Family history of stomach cancer      REVIEW OF SYSTEMS:    CONSTITUTIONAL: No weakness, fevers or chills  RESPIRATORY: No cough, wheezing, hemoptysis; No shortness of breath  CARDIOVASCULAR: No chest pain or palpitations  GASTROINTESTINAL: No abdominal or epigastric pain. No nausea, vomiting,     No diarrhea or constipation. No melena   GENITOURINARY: No dysuria, frequency or hematuria  NEUROLOGICAL: No numbness or weakness  SKIN: dry                                       9.5    6.95  )-----------( 62       ( 01 Dec 2019 06:13 )             26.9       CBC Full  -  ( 01 Dec 2019 06:13 )  WBC Count : 6.95 K/uL  RBC Count : 2.80 M/uL  Hemoglobin : 9.5 g/dL  Hematocrit : 26.9 %  Platelet Count - Automated : 62 K/uL  Mean Cell Volume : 96.1 fl  Mean Cell Hemoglobin : 33.9 pg  Mean Cell Hemoglobin Concentration : 35.3 gm/dL  Auto Neutrophil # : x  Auto Lymphocyte # : x  Auto Monocyte # : x  Auto Eosinophil # : x  Auto Basophil # : x  Auto Neutrophil % : x  Auto Lymphocyte % : x  Auto Monocyte % : x  Auto Eosinophil % : x  Auto Basophil % : x      12-01    131<L>  |  88<L>  |  67<H>  ----------------------------<  102<H>  3.7   |  27  |  2.47<H>    Ca    10.5      01 Dec 2019 06:13    TPro  6.9  /  Alb  4.0  /  TBili  8.7<H>  /  DBili  x   /  AST  35  /  ALT  21  /  AlkPhos  123<H>  12-01      CAPILLARY BLOOD GLUCOSE      POCT Blood Glucose.: 122 mg/dL (01 Dec 2019 12:04)  POCT Blood Glucose.: 103 mg/dL (01 Dec 2019 08:28)  POCT Blood Glucose.: 129 mg/dL (30 Nov 2019 21:35)  POCT Blood Glucose.: 122 mg/dL (30 Nov 2019 17:21)  POCT Blood Glucose.: 160 mg/dL (30 Nov 2019 12:35)      Vital Signs Last 24 Hrs  T(C): 36.5 (01 Dec 2019 04:36), Max: 36.6 (30 Nov 2019 12:55)  T(F): 97.7 (01 Dec 2019 04:36), Max: 97.8 (30 Nov 2019 12:55)  HR: 81 (01 Dec 2019 04:36) (75 - 81)  BP: 113/57 (01 Dec 2019 04:36) (107/62 - 116/66)  BP(mean): --  RR: 18 (01 Dec 2019 04:36) (17 - 18)  SpO2: 98% (01 Dec 2019 04:36) (98% - 98%)        PT/INR - ( 01 Dec 2019 06:13 )   PT: 22.5 sec;   INR: 1.92 ratio         PTT - ( 30 Nov 2019 07:09 )  PTT:41.6 sec                 PHYSICAL EXAM:    Constitutional: NAD  HEENT: conjunctive   clear   Neck:  No JVD  Respiratory: CTAB  Cardiovascular: S1 and S2  Gastrointestinal: BS+, soft, NT/ND  Extremities: No peripheral edema  Neurological: A/O x 3, no focal deficits  Psychiatric: Normal mood, normal affect  : No Beasley  Skin: No rashes  Access: Not applicable

## 2019-12-01 NOTE — PROGRESS NOTE ADULT - PROBLEM SELECTOR PLAN 1
- likely hepatorenal syndrome; renal input appreciated  - titrate lactulose for 2-3BM/day; please give Lactulose Enemas if pt unable to tolerate PO intake   - continue xifaxan bid   - diet as tolerated with aspiration precautions  - fu palliative recs

## 2019-12-01 NOTE — PROGRESS NOTE ADULT - ASSESSMENT
63 year old male with HTN, DM2, non-alcoholic cirrhosis, who presents with altered mental status. We last saw him during a hospitalization in 10/2019, during which he was significantly volume overloaded and required iv diuresis.  He is confused this morning and is unable to give an interval history. His hyponatremia is improving, though his ammonia remans high.    Abnormal EKG  - EKG is notable for prolonged qtc which was still present on 11/30  - avoid qtc prolonging medications  - replete K  - check daily mg as well  - Monitor and replete electrolytes. Keep K>4.0 and Mg>2.0.   - repeat daily EKG    Diastolic HF   - no sign of acute ischemia  - no significant volume overload on exam. CT without pleural effusions or edema, though notable for possible chest wall hematoma.  - can hold diuretics in the short term   - GI and renal follow up.  - TTE normal LV systolic function EF 65%; LVH, DD Grade I    HTN  - BP on softer side  - hold diuretics and anti-hypertensives  - when able start BB    - cont statin for cad/atherosclerosis noted on ct  - lfts remain within normal limits    - Watch creatinine and electrolytes. Keep K>4, mg>2  - All other workup as per primary team  - Will follow with you. Further cardiac w/u as indicated by clinical course

## 2019-12-01 NOTE — PROGRESS NOTE ADULT - SUBJECTIVE AND OBJECTIVE BOX
SUBJECTIVE / OVERNIGHT EVENTS: pt denies headache, nausea,v   chest pain, shortness of breath more confused than yesterday   + bms    MEDICATIONS  (STANDING):  atorvastatin 10 milliGRAM(s) Oral at bedtime  dextrose 5%. 1000 milliLiter(s) (50 mL/Hr) IV Continuous <Continuous>  dextrose 50% Injectable 12.5 Gram(s) IV Push once  dextrose 50% Injectable 25 Gram(s) IV Push once  dextrose 50% Injectable 25 Gram(s) IV Push once  insulin glargine Injectable (LANTUS) 20 Unit(s) SubCutaneous at bedtime  insulin lispro (HumaLOG) corrective regimen sliding scale   SubCutaneous three times a day before meals  insulin lispro (HumaLOG) corrective regimen sliding scale   SubCutaneous at bedtime  lactulose Syrup 30 Gram(s) Oral every 4 hours  pantoprazole    Tablet 40 milliGRAM(s) Oral before breakfast  rifAXIMin 550 milliGRAM(s) Oral two times a day  sucralfate 1 Gram(s) Oral two times a day  tamsulosin 0.4 milliGRAM(s) Oral at bedtime  thiamine IVPB 500 milliGRAM(s) IV Intermittent three times a day  ursodiol Capsule 300 milliGRAM(s) Oral two times a day    MEDICATIONS  (PRN):  dextrose 40% Gel 15 Gram(s) Oral once PRN Blood Glucose LESS THAN 70 milliGRAM(s)/deciliter  glucagon  Injectable 1 milliGRAM(s) IntraMuscular once PRN Glucose LESS THAN 70 milligrams/deciliter    Vital Signs Last 24 Hrs  T(C): 36.4 (01 Dec 2019 20:53), Max: 36.9 (01 Dec 2019 12:25)  T(F): 97.6 (01 Dec 2019 20:53), Max: 98.5 (01 Dec 2019 12:25)  HR: 85 (01 Dec 2019 20:53) (78 - 85)  BP: 110/66 (01 Dec 2019 20:53) (75/44 - 113/57)  BP(mean): --  RR: 18 (01 Dec 2019 20:53) (18 - 18)  SpO2: 97% (01 Dec 2019 20:53) (96% - 99%)    Constitutional: No fever, fatigue  Skin: No rash.  Eyes: No recent vision problems or eye pain.  ENT: No congestion, ear pain, or sore throat.  Cardiovascular: No chest pain or palpation.  Respiratory: No cough, shortness of breath, congestion, or wheezing.  Gastrointestinal: No abdominal pain, nausea, vomiting, or diarrhea.  Genitourinary: No dysuria.  Musculoskeletal: No joint swelling.  Neurologic: No headache.    PHYSICAL EXAM:  GENERAL: NAD  EYES: EOMI, PERRLA  NECK: Supple, No JVD  CHEST/LUNG: dec breath sounds at bases   HEART:  S1 , S2 +  ABDOMEN: soft , bs+, mild distension + , no tenderness  EXTREMITIES:  no edema  NEUROLOGY:alert awake oriented   flap tremor+    LABS:      131<L>  |  88<L>  |  67<H>  ----------------------------<  102<H>  3.7   |  27  |  2.47<H>    Ca    10.5      01 Dec 2019 06:13    TPro  6.9  /  Alb  4.0  /  TBili  8.7<H>  /  DBili      /  AST  35  /  ALT  21  /  AlkPhos  123<H>      Creatinine Trend: 2.47 <--, 2.50 <--, 2.29 <--, 2.31 <--, 2.55 <--, 2.59 <--, 2.70 <--, 2.68 <--, 2.63 <--, 2.67 <--, 2.61 <--, 2.55 <--                        9.5    6.95  )-----------( 62       ( 01 Dec 2019 06:13 )             26.9     Urine Studies:  Urinalysis Basic - ( 2019 08:42 )    Color: Yellow / Appearance: Slightly Turbid / S.013 / pH:   Gluc:  / Ketone: Negative  / Bili: Negative / Urobili: <2 mg/dL   Blood:  / Protein: Trace / Nitrite: Negative   Leuk Esterase: Negative / RBC: 100 /HPF / WBC 4 /HPF   Sq Epi:  / Non Sq Epi: 0 /HPF / Bacteria: Negative      Osmolality, Random Urine: 335 mosm/Kg ( @ 00:20)  Creatinine, Random Urine: 67 mg/dL ( @ 23:21)  Creatinine, Random Urine: 68 mg/dL ( @ 21:19)  Protein/Creatinine Ratio Calculation: 0.2 Ratio ( @ 21:19)  Potassium, Random Urine: 37 mmol/L ( @ 21:19)  Sodium, Random Urine: 61 mmol/L ( @ 21:19)          LIVER FUNCTIONS - ( 01 Dec 2019 06:13 )  Alb: 4.0 g/dL / Pro: 6.9 g/dL / ALK PHOS: 123 U/L / ALT: 21 U/L / AST: 35 U/L / GGT: x           PT/INR - ( 01 Dec 2019 06:13 )   PT: 22.5 sec;   INR: 1.92 ratio         PTT - ( 2019 07:09 )  PTT:41.6 sec  Osmolality, Random Urine: 335 mosm/Kg ( @ 00:20)  Creatinine, Random Urine: 67 mg/dL ( @ 23:21)  Creatinine, Random Urine: 68 mg/dL ( 21:19)  Protein/Creatinine Ratio Calculation: 0.2 Ratio ( 21:19)  Potassium, Random Urine: 37 mmol/L ( 21:19)  Sodium, Random Urine: 61 mmol/L ( @ 21:19)          LIVER FUNCTIONS - ( 2019 07:09 )  Alb: 4.0 g/dL / Pro: 6.7 g/dL / ALK PHOS: 121 U/L / ALT: 21 U/L / AST: 31 U/L / GGT: x           PT/INR - ( 2019 07:09 )   PT: 23.5 sec;   INR: 2.02 ratio         PTT - ( 2019 07:09 )  PTT:41.6 sec       PTT - ( 2019 08:22 )  PTT:37.6 sec  Color: Yellow / Appearance: Slightly Turbid / S.013 / pH:   Gluc:  / Ketone: Negative  / Bili: Negative / Urobili: <2 mg/dL   Blood:  / Protein: Trace / Nitrite: Negative   Leuk Esterase: Negative / RBC: 100 /HPF / WBC 4 /HPF   Sq Epi:  / Non Sq Epi: 0 /HPF / Bacteria: Negative      Osmolality, Random Urine: 335 mosm/Kg ( @ 00:20)  Creatinine, Random Urine: 67 mg/dL ( @ 23:21)  Creatinine, Random Urine: 68 mg/dL ( @ 21:19)  Protein/Creatinine Ratio Calculation: 0.2 Ratio ( @ 21:19)  Potassium, Random Urine: 37 mmol/L ( @ 21:19)  Sodium, Random Urine: 61 mmol/L ( @ 21:19)          LIVER FUNCTIONS - ( 2019 06:29 )  Alb: 3.0 g/dL / Pro: 6.9 g/dL / ALK PHOS: 182 U/L / ALT: 23 U/L / AST: 36 U/L / GGT: x           PT/INR - ( 2019 08:38 )   PT: 21.8 sec;   INR: 1.88 ratio         PTT - ( 2019 08:38 )  PTT:36.8 sec  Blood: x / Protein: Negative / Nitrite: Negative   Leuk Esterase: Negative / RBC: 14 /HPF / WBC 4 /HPF   Sq Epi: x / Non Sq Epi: 0 /HPF / Bacteria: Negative        RADIOLOGY & ADDITIONAL TESTS:    Imaging Personally Reviewed:    Consultant(s) Notes Reviewed:      Care Discussed with Consultants/Other Providers:

## 2019-12-02 DIAGNOSIS — Z51.5 ENCOUNTER FOR PALLIATIVE CARE: ICD-10-CM

## 2019-12-02 DIAGNOSIS — K74.60 UNSPECIFIED CIRRHOSIS OF LIVER: ICD-10-CM

## 2019-12-02 LAB
ALBUMIN SERPL ELPH-MCNC: 3.8 G/DL — SIGNIFICANT CHANGE UP (ref 3.3–5)
ALP SERPL-CCNC: 131 U/L — HIGH (ref 40–120)
ALT FLD-CCNC: 21 U/L — SIGNIFICANT CHANGE UP (ref 10–45)
AMMONIA BLD-MCNC: 108 UMOL/L — HIGH (ref 11–55)
ANION GAP SERPL CALC-SCNC: 14 MMOL/L — SIGNIFICANT CHANGE UP (ref 5–17)
AST SERPL-CCNC: 34 U/L — SIGNIFICANT CHANGE UP (ref 10–40)
BILIRUB SERPL-MCNC: 8.3 MG/DL — HIGH (ref 0.2–1.2)
BUN SERPL-MCNC: 59 MG/DL — HIGH (ref 7–23)
CALCIUM SERPL-MCNC: 10.1 MG/DL — SIGNIFICANT CHANGE UP (ref 8.4–10.5)
CHLORIDE SERPL-SCNC: 87 MMOL/L — LOW (ref 96–108)
CO2 SERPL-SCNC: 27 MMOL/L — SIGNIFICANT CHANGE UP (ref 22–31)
CREAT ?TM UR-MCNC: 89 MG/DL — SIGNIFICANT CHANGE UP
CREAT ?TM UR-MCNC: 99 MG/DL — SIGNIFICANT CHANGE UP
CREAT SERPL-MCNC: 2.49 MG/DL — HIGH (ref 0.5–1.3)
CULTURE RESULTS: SIGNIFICANT CHANGE UP
CULTURE RESULTS: SIGNIFICANT CHANGE UP
GLUCOSE BLDC GLUCOMTR-MCNC: 142 MG/DL — HIGH (ref 70–99)
GLUCOSE BLDC GLUCOMTR-MCNC: 167 MG/DL — HIGH (ref 70–99)
GLUCOSE BLDC GLUCOMTR-MCNC: 168 MG/DL — HIGH (ref 70–99)
GLUCOSE BLDC GLUCOMTR-MCNC: 176 MG/DL — HIGH (ref 70–99)
GLUCOSE SERPL-MCNC: 119 MG/DL — HIGH (ref 70–99)
HCT VFR BLD CALC: 27 % — LOW (ref 39–50)
HGB BLD-MCNC: 9.4 G/DL — LOW (ref 13–17)
INR BLD: 1.93 RATIO — HIGH (ref 0.88–1.16)
MCHC RBC-ENTMCNC: 33.9 PG — SIGNIFICANT CHANGE UP (ref 27–34)
MCHC RBC-ENTMCNC: 34.8 GM/DL — SIGNIFICANT CHANGE UP (ref 32–36)
MCV RBC AUTO: 97.5 FL — SIGNIFICANT CHANGE UP (ref 80–100)
OXALATE UR-MCNC: 58.8 MG/24 H — HIGH (ref 3.6–38)
PLATELET # BLD AUTO: 53 K/UL — LOW (ref 150–400)
PORPHYRINS UR-MCNC: 2100 ML/24 H — SIGNIFICANT CHANGE UP
POTASSIUM SERPL-MCNC: 3.8 MMOL/L — SIGNIFICANT CHANGE UP (ref 3.5–5.3)
POTASSIUM SERPL-SCNC: 3.8 MMOL/L — SIGNIFICANT CHANGE UP (ref 3.5–5.3)
PROT ?TM UR-MCNC: 49 MG/DL — HIGH (ref 0–12)
PROT ?TM UR-MCNC: 61 MG/DL — HIGH (ref 0–12)
PROT SERPL-MCNC: 6.6 G/DL — SIGNIFICANT CHANGE UP (ref 6–8.3)
PROT/CREAT UR-RTO: 0.6 RATIO — HIGH (ref 0–0.2)
PROTHROM AB SERPL-ACNC: 22.5 SEC — HIGH (ref 10–13.1)
RBC # BLD: 2.77 M/UL — LOW (ref 4.2–5.8)
RBC # FLD: 18.9 % — HIGH (ref 10.3–14.5)
SODIUM SERPL-SCNC: 128 MMOL/L — LOW (ref 135–145)
SODIUM UR-SCNC: <35 MMOL/L — SIGNIFICANT CHANGE UP
SPECIMEN SOURCE: SIGNIFICANT CHANGE UP
SPECIMEN SOURCE: SIGNIFICANT CHANGE UP
WBC # BLD: 7.76 K/UL — SIGNIFICANT CHANGE UP (ref 3.8–10.5)
WBC # FLD AUTO: 7.76 K/UL — SIGNIFICANT CHANGE UP (ref 3.8–10.5)

## 2019-12-02 PROCEDURE — 99223 1ST HOSP IP/OBS HIGH 75: CPT

## 2019-12-02 PROCEDURE — 99232 SBSQ HOSP IP/OBS MODERATE 35: CPT

## 2019-12-02 PROCEDURE — 99233 SBSQ HOSP IP/OBS HIGH 50: CPT

## 2019-12-02 RX ORDER — SODIUM CHLORIDE 9 MG/ML
2 INJECTION INTRAMUSCULAR; INTRAVENOUS; SUBCUTANEOUS EVERY 6 HOURS
Refills: 0 | Status: COMPLETED | OUTPATIENT
Start: 2019-12-02 | End: 2019-12-03

## 2019-12-02 RX ORDER — SODIUM CHLORIDE 9 MG/ML
1000 INJECTION INTRAMUSCULAR; INTRAVENOUS; SUBCUTANEOUS
Refills: 0 | Status: DISCONTINUED | OUTPATIENT
Start: 2019-12-02 | End: 2019-12-02

## 2019-12-02 RX ORDER — OCTREOTIDE ACETATE 200 UG/ML
100 INJECTION, SOLUTION INTRAVENOUS; SUBCUTANEOUS THREE TIMES A DAY
Refills: 0 | Status: DISCONTINUED | OUTPATIENT
Start: 2019-12-02 | End: 2019-12-02

## 2019-12-02 RX ORDER — SODIUM CHLORIDE 9 MG/ML
500 INJECTION INTRAMUSCULAR; INTRAVENOUS; SUBCUTANEOUS ONCE
Refills: 0 | Status: COMPLETED | OUTPATIENT
Start: 2019-12-02 | End: 2019-12-02

## 2019-12-02 RX ORDER — MIDODRINE HYDROCHLORIDE 2.5 MG/1
10 TABLET ORAL THREE TIMES A DAY
Refills: 0 | Status: DISCONTINUED | OUTPATIENT
Start: 2019-12-02 | End: 2019-12-03

## 2019-12-02 RX ORDER — ALBUMIN HUMAN 25 %
100 VIAL (ML) INTRAVENOUS EVERY 8 HOURS
Refills: 0 | Status: COMPLETED | OUTPATIENT
Start: 2019-12-02 | End: 2019-12-03

## 2019-12-02 RX ADMIN — Medication 50 MILLILITER(S): at 21:43

## 2019-12-02 RX ADMIN — Medication 105 MILLIGRAM(S): at 05:17

## 2019-12-02 RX ADMIN — LACTULOSE 30 GRAM(S): 10 SOLUTION ORAL at 05:17

## 2019-12-02 RX ADMIN — SODIUM CHLORIDE 2 GRAM(S): 9 INJECTION INTRAMUSCULAR; INTRAVENOUS; SUBCUTANEOUS at 17:32

## 2019-12-02 RX ADMIN — MIDODRINE HYDROCHLORIDE 10 MILLIGRAM(S): 2.5 TABLET ORAL at 16:25

## 2019-12-02 RX ADMIN — URSODIOL 300 MILLIGRAM(S): 250 TABLET, FILM COATED ORAL at 05:19

## 2019-12-02 RX ADMIN — INSULIN GLARGINE 20 UNIT(S): 100 INJECTION, SOLUTION SUBCUTANEOUS at 22:11

## 2019-12-02 RX ADMIN — Medication 1: at 12:41

## 2019-12-02 RX ADMIN — SODIUM CHLORIDE 50 MILLILITER(S): 9 INJECTION INTRAMUSCULAR; INTRAVENOUS; SUBCUTANEOUS at 12:33

## 2019-12-02 RX ADMIN — Medication 1 GRAM(S): at 05:19

## 2019-12-02 RX ADMIN — LACTULOSE 30 GRAM(S): 10 SOLUTION ORAL at 21:42

## 2019-12-02 RX ADMIN — Medication 1 GRAM(S): at 17:26

## 2019-12-02 RX ADMIN — LACTULOSE 30 GRAM(S): 10 SOLUTION ORAL at 17:26

## 2019-12-02 RX ADMIN — ATORVASTATIN CALCIUM 10 MILLIGRAM(S): 80 TABLET, FILM COATED ORAL at 21:42

## 2019-12-02 RX ADMIN — TAMSULOSIN HYDROCHLORIDE 0.4 MILLIGRAM(S): 0.4 CAPSULE ORAL at 21:40

## 2019-12-02 RX ADMIN — Medication 105 MILLIGRAM(S): at 14:21

## 2019-12-02 RX ADMIN — SODIUM CHLORIDE 250 MILLILITER(S): 9 INJECTION INTRAMUSCULAR; INTRAVENOUS; SUBCUTANEOUS at 15:55

## 2019-12-02 RX ADMIN — Medication 50 MILLILITER(S): at 15:47

## 2019-12-02 RX ADMIN — LACTULOSE 30 GRAM(S): 10 SOLUTION ORAL at 14:21

## 2019-12-02 RX ADMIN — PANTOPRAZOLE SODIUM 40 MILLIGRAM(S): 20 TABLET, DELAYED RELEASE ORAL at 05:19

## 2019-12-02 RX ADMIN — Medication 1: at 17:25

## 2019-12-02 RX ADMIN — LACTULOSE 30 GRAM(S): 10 SOLUTION ORAL at 10:53

## 2019-12-02 NOTE — CONSULT NOTE ADULT - PROBLEM SELECTOR RECOMMENDATION 9
- US Abd without ascites; CT abd without new gi pathology; no liver lesions  - mental status has improved since admission   - continue xifaxan bid   - titrate lactulose for 3-4 BM/day; encouraged pt to be compliant   - ID/Hepatology/Renal input noted and appreciated; cont to follow recs  - fu BCx
ACUTE RENAL FAILURE: possible due to prerenal azotemia   increase water intake , low salt   will f/u with dena gamez   Serum creatinine is 2.55   , approximating GFR is decreased    ml/min.   There is  progression . No uremic symptoms    pos evidence of anemia .  Fluid status stable.  Will continue to avoid nephrotoxic drugs.  Patient remains asymptomatic.   Continue current therapy.  hold diuresis hold arb   catie acei
hepatology on board  per discussion with Dr. Medina, patient to be referred for transplant evaluation

## 2019-12-02 NOTE — CONSULT NOTE ADULT - SUBJECTIVE AND OBJECTIVE BOX
HPI:  63M w/ pmh HTN, HLD, DM, non-alcoholic cirrhosis, chronic thrombocytopenia -- sent from rehab for worsening jaundice, dizzy/weakness/falls x 4 days. Discharged to rehab after last hospitalization. No fever, n/v/d/c, chest / abd pain, cough, sob, dysuria/hematuria. recently admitted at OSH for sepsis, hypoglycemia, cirrhosis (lactulose, xifaxan), hepatic encephalopathy, GI bleed; G+ bacteremia, EGD, last bm yesterday morning (26 Nov 2019 17:37)    PERTINENT PM/SXH:   GIB (gastrointestinal bleeding)  GERD with esophagitis  Hepatic encephalopathy  Obesity  Fatty liver disease, nonalcoholic  Renal stones  Hypertension  Neuropathy  Hypercholesteremia  Diabetes    S/P cholecystectomy  No significant past surgical history    FAMILY HISTORY:  Family history of type 2 diabetes mellitus  Family history of hypertension  Family history of stomach cancer    ITEMS NOT CHECKED ARE NOT PRESENT    SOCIAL HISTORY:   Significant other/partner[ ]  Children[ ]  Sabianism/Spirituality:  Substance hx:  [ ]   Tobacco hx:  [ ]   Alcohol hx: [ ]   Home Opioid hx:  [ ] I-Stop Reference No:  Living Situation: [ ]Home  [ ]Long term care  [ ]Rehab [ ]Other    ADVANCE DIRECTIVES:    DNR  MOLST  [ ]  Living Will  [ ]   DECISION MAKER(s):  [ ] Health Care Proxy(s)  [ ] Surrogate(s)  [ ] Guardian           Name(s): Phone Number(s):    BASELINE (I)ADL(s) (prior to admission):  [place]: [ ]Total  [ ] Moderate [ ]Dependent    Allergies    codeine (Anaphylaxis)    Intolerances    NO  RED MEAT (Unknown)  MEDICATIONS  (STANDING):  atorvastatin 10 milliGRAM(s) Oral at bedtime  dextrose 5%. 1000 milliLiter(s) (50 mL/Hr) IV Continuous <Continuous>  dextrose 50% Injectable 12.5 Gram(s) IV Push once  dextrose 50% Injectable 25 Gram(s) IV Push once  dextrose 50% Injectable 25 Gram(s) IV Push once  insulin glargine Injectable (LANTUS) 20 Unit(s) SubCutaneous at bedtime  insulin lispro (HumaLOG) corrective regimen sliding scale   SubCutaneous three times a day before meals  insulin lispro (HumaLOG) corrective regimen sliding scale   SubCutaneous at bedtime  lactulose Syrup 30 Gram(s) Oral every 4 hours  midodrine. 10 milliGRAM(s) Oral three times a day  octreotide  Injectable 100 MICROGram(s) SubCutaneous three times a day  pantoprazole    Tablet 40 milliGRAM(s) Oral before breakfast  rifAXIMin 550 milliGRAM(s) Oral two times a day  sodium chloride 0.9%. 1000 milliLiter(s) (50 mL/Hr) IV Continuous <Continuous>  sucralfate 1 Gram(s) Oral two times a day  tamsulosin 0.4 milliGRAM(s) Oral at bedtime  thiamine IVPB 500 milliGRAM(s) IV Intermittent three times a day  ursodiol Capsule 300 milliGRAM(s) Oral two times a day    MEDICATIONS  (PRN):  dextrose 40% Gel 15 Gram(s) Oral once PRN Blood Glucose LESS THAN 70 milliGRAM(s)/deciliter  glucagon  Injectable 1 milliGRAM(s) IntraMuscular once PRN Glucose LESS THAN 70 milligrams/deciliter    PRESENT SYMPTOMS: [ ]Unable to obtain due to poor mentation   Source if other than patient:  [ ]Family   [ ]Team     Pain: [ ]yes [ ]no  QOL impact -   Location -                    Aggravating factors -  Quality -  Radiation -  Timing-  Severity (0-10 scale):  Minimal acceptable level (0-10 scale):     PAIN AD Score:     http://[email or web address]/painad.pdf (press ctrl +  left click to view)    Dyspnea:                           [ ]Mild [ ]Moderate [ ]Severe  Anxiety:                             [ ]Mild [ ]Moderate [ ]Severe  Fatigue:                             [ ]Mild [ ]Moderate [ ]Severe  Nausea:                             [ ]Mild [ ]Moderate [ ]Severe  Loss of appetite:              [ ]Mild [ ]Moderate [ ]Severe  Constipation:                    [ ]Mild [ ]Moderate [ ]Severe    Other Symptoms:  [ ]All other review of systems negative     Palliative Performance Status Version 2:         %    http://npcrc.org/files/news/palliative_performance_scale_ppsv2.pdf  PHYSICAL EXAM:  Vital Signs Last 24 Hrs  T(C): 36.8 (02 Dec 2019 12:42), Max: 36.8 (02 Dec 2019 12:42)  T(F): 98.2 (02 Dec 2019 12:42), Max: 98.2 (02 Dec 2019 12:42)  HR: 75 (02 Dec 2019 12:42) (75 - 85)  BP: 105/61 (02 Dec 2019 12:42) (105/61 - 110/66)  BP(mean): --  RR: 18 (02 Dec 2019 12:42) (18 - 18)  SpO2: 96% (02 Dec 2019 12:42) (96% - 97%) I&O's Summary    01 Dec 2019 07:01  -  02 Dec 2019 07:00  --------------------------------------------------------  IN: 720 mL / OUT: 1700 mL / NET: -980 mL      GENERAL:  [ ]Alert  [ ]Oriented x   [ ]Lethargic  [ ]Cachexia  [ ]Unarousable  [ ]Verbal  [ ]Non-Verbal  Behavioral:   [ ] Anxiety  [ ] Delirium [ ] Agitation [ ] Other  HEENT:  [ ]Normal   [ ]Dry mouth   [ ]ET Tube/Trach  [ ]Oral lesions  PULMONARY:   [ ]Clear [ ]Tachypnea  [ ]Audible excessive secretions   [ ]Rhonchi        [ ]Right [ ]Left [ ]Bilateral  [ ]Crackles        [ ]Right [ ]Left [ ]Bilateral  [ ]Wheezing     [ ]Right [ ]Left [ ]Bilatera  [ ]Diminished breath sounds [ ]right [ ]left [ ]bilateral  CARDIOVASCULAR:    [ ]Regular [ ]Irregular [ ]Tachy  [ ]Camron [ ]Murmur [ ]Other  GASTROINTESTINAL:  [ ]Soft  [ ]Distended   [ ]+BS  [ ]Non tender [ ]Tender  [ ]PEG [ ]OGT/ NGT  Last BM:   11-27-19 @ 07:01  -  11-28-19 @ 07:00  --------------------------------------------------------  OUT: 0 mL      GENITOURINARY:  [ ]Normal [ ] Incontinent   [ ]Oliguria/Anuria   [ ]Beasley  MUSCULOSKELETAL:   [ ]Normal   [ ]Weakness  [ ]Bed/Wheelchair bound [ ]Edema  NEUROLOGIC:   [ ]No focal deficits  [ ]Cognitive impairment  [ ]Dysphagia [ ]Dysarthria [ ]Paresis [ ]Other   SKIN:   [ ]Normal    [ ]Rash  [ ]Pressure ulcer(s)       Present on admission [ ]y [ ]n    CRITICAL CARE:  [ ] Shock Present  [ ]Septic [ ]Cardiogenic [ ]Neurologic [ ]Hypovolemic  [ ]  Vasopressors [ ]  Inotropes   [ ]Respiratory failure present [ ]Mechanical ventilation [ ]Non-invasive ventilatory support [ ]High flow  [ ]Acute  [ ]Chronic [ ]Hypoxic  [ ]Hypercarbic [ ]Other  [ ]Other organ failure     LABS:                        9.4    7.76  )-----------( 53       ( 02 Dec 2019 08:04 )             27.0   12-02    128<L>  |  87<L>  |  59<H>  ----------------------------<  119<H>  3.8   |  27  |  2.49<H>    Ca    10.1      02 Dec 2019 06:00    TPro  6.6  /  Alb  3.8  /  TBili  8.3<H>  /  DBili  x   /  AST  34  /  ALT  21  /  AlkPhos  131<H>  12-02  PT/INR - ( 02 Dec 2019 07:49 )   PT: 22.5 sec;   INR: 1.93 ratio               RADIOLOGY & ADDITIONAL STUDIES:    PROTEIN CALORIE MALNUTRITION PRESENT: [ ]mild [ ]moderate [ ]severe [ ]underweight [ ]morbid obesity  https://www.andeal.org/vault/2440/web/files/ONC/Table_Clinical%20Characteristics%20to%20Document%20Malnutrition-White%20JV%20et%20al%556786.pdf    Height (cm): 185.4 (11-26-19 @ 18:20), 182.9 (10-23-19 @ 00:00), 182.88 (04-03-19 @ 06:11)  Weight (kg): 115.5 (11-26-19 @ 18:20), 164.2 (10-23-19 @ 00:00), 163.3 (10-08-19 @ 17:41)  BMI (kg/m2): 33.6 (11-26-19 @ 18:20), 49.1 (10-23-19 @ 00:00), 48.8 (10-08-19 @ 17:41)    [ ]PPSV2 < or = to 30% [ ]significant weight loss  [ ]poor nutritional intake  [ ]anasarca     Albumin, Serum: 3.8 g/dL (12-02-19 @ 06:00)   [ ]Artificial Nutrition      REFERRALS:   [ ]Chaplaincy  [ ]Hospice  [ ]Child Life  [ ]Social Work  [ ]Case management [ ]Holistic Therapy     Goals of Care Document: HPI:  63M w/ pmh HTN, HLD, DM, non-alcoholic cirrhosis, chronic thrombocytopenia -- sent from rehab for worsening jaundice, dizzy/weakness/falls x 4 days. Discharged to rehab after last hospitalization. No fever, n/v/d/c, chest / abd pain, cough, sob, dysuria/hematuria. recently admitted at OSH for sepsis, hypoglycemia, cirrhosis (lactulose, xifaxan), hepatic encephalopathy, GI bleed; G+ bacteremia, EGD, last bm yesterday morning (26 Nov 2019 17:37)    Palliative care consulted for GOC in the setting of decompensated cirrhosis. Patient seen and examined, remains encephalopathic but improved per patient and team report. Oritented x3 but not able to fully discuss his medical situation, reason for hospitalization and seriousness of disease process. Patient with negative ROS. States he has a wife and 3 children (17, 21, 33). Wife is surrogate decision maker. Introduced HCP, patient unsure if filled out in past.     Discussed case with hepatology who feels patient should be referred for liver transplantation.    PERTINENT PM/SXH:   GIB (gastrointestinal bleeding)  GERD with esophagitis  Hepatic encephalopathy  Obesity  Fatty liver disease, nonalcoholic  Renal stones  Hypertension  Neuropathy  Hypercholesteremia  Diabetes    S/P cholecystectomy  No significant past surgical history    FAMILY HISTORY:  Family history of type 2 diabetes mellitus  Family history of hypertension  Family history of stomach cancer    ITEMS NOT CHECKED ARE NOT PRESENT    SOCIAL HISTORY:   Significant other/partner[ x]  Children[ x]  Moravian/Spirituality:  Substance hx:  [ ]   Tobacco hx:  [ ]   Alcohol hx: [ ]   Home Opioid hx:  [ ] I-Stop Reference No:  Living Situation: [ ]Home  [ ]Long term care  [x ]Rehab [ ]Other    ADVANCE DIRECTIVES:    DNR  MOLST  [ ]  Living Will  [ ]   DECISION MAKER(s):  [ ] Health Care Proxy(s)  [ x] Surrogate(s)  [ ] Guardian           Name(s): Phone Number(s): Wife Ada, number per EMR    BASELINE (I)ADL(s) (prior to admission):  [place]: [ ]Total  [ x] Moderate [ ]Dependent    Allergies    codeine (Anaphylaxis)    Intolerances    NO  RED MEAT (Unknown)  MEDICATIONS  (STANDING):  atorvastatin 10 milliGRAM(s) Oral at bedtime  dextrose 5%. 1000 milliLiter(s) (50 mL/Hr) IV Continuous <Continuous>  dextrose 50% Injectable 12.5 Gram(s) IV Push once  dextrose 50% Injectable 25 Gram(s) IV Push once  dextrose 50% Injectable 25 Gram(s) IV Push once  insulin glargine Injectable (LANTUS) 20 Unit(s) SubCutaneous at bedtime  insulin lispro (HumaLOG) corrective regimen sliding scale   SubCutaneous three times a day before meals  insulin lispro (HumaLOG) corrective regimen sliding scale   SubCutaneous at bedtime  lactulose Syrup 30 Gram(s) Oral every 4 hours  midodrine. 10 milliGRAM(s) Oral three times a day  octreotide  Injectable 100 MICROGram(s) SubCutaneous three times a day  pantoprazole    Tablet 40 milliGRAM(s) Oral before breakfast  rifAXIMin 550 milliGRAM(s) Oral two times a day  sodium chloride 0.9%. 1000 milliLiter(s) (50 mL/Hr) IV Continuous <Continuous>  sucralfate 1 Gram(s) Oral two times a day  tamsulosin 0.4 milliGRAM(s) Oral at bedtime  thiamine IVPB 500 milliGRAM(s) IV Intermittent three times a day  ursodiol Capsule 300 milliGRAM(s) Oral two times a day    MEDICATIONS  (PRN):  dextrose 40% Gel 15 Gram(s) Oral once PRN Blood Glucose LESS THAN 70 milliGRAM(s)/deciliter  glucagon  Injectable 1 milliGRAM(s) IntraMuscular once PRN Glucose LESS THAN 70 milligrams/deciliter    PRESENT SYMPTOMS: [ ]Unable to obtain due to poor mentation   Source if other than patient:  [ ]Family   [ ]Team     Pain: [ ]yes [ ]no  QOL impact -   Location -                    Aggravating factors -  Quality -  Radiation -  Timing-  Severity (0-10 scale):  Minimal acceptable level (0-10 scale):     PAIN AD Score:     http://geriatrictoolkit.[place].[facility]/cog/painad.pdf (press ctrl +  left click to view)    Dyspnea:                           [ ]Mild [ ]Moderate [ ]Severe  Anxiety:                             [ ]Mild [ ]Moderate [ ]Severe  Fatigue:                             [ ]Mild [ ]Moderate [ ]Severe  Nausea:                             [ ]Mild [ ]Moderate [ ]Severe  Loss of appetite:              [ ]Mild [ ]Moderate [ ]Severe  Constipation:                    [ ]Mild [ ]Moderate [ ]Severe    Other Symptoms:  [ x]All other review of systems negative     Palliative Performance Status Version 2:         30-40%    http://npcrc.org/files/news/palliative_performance_scale_ppsv2.pdf  PHYSICAL EXAM:  Vital Signs Last 24 Hrs  T(C): 36.8 (02 Dec 2019 12:42), Max: 36.8 (02 Dec 2019 12:42)  T(F): 98.2 (02 Dec 2019 12:42), Max: 98.2 (02 Dec 2019 12:42)  HR: 75 (02 Dec 2019 12:42) (75 - 85)  BP: 105/61 (02 Dec 2019 12:42) (105/61 - 110/66)  BP(mean): --  RR: 18 (02 Dec 2019 12:42) (18 - 18)  SpO2: 96% (02 Dec 2019 12:42) (96% - 97%) I&O's Summary    01 Dec 2019 07:01  -  02 Dec 2019 07:00  --------------------------------------------------------  IN: 720 mL / OUT: 1700 mL / NET: -980 mL      GENERAL:  [x ]Alert  [x ]Oriented x3   [ ]Lethargic  [ ]Cachexia  [ ]Unarousable  [ ]Verbal  [ ]Non-Verbal  Behavioral:   [ ] Anxiety  [ x] Delirium [ ] Agitation [ ] Other  HEENT:  [ ]Normal   [x ]Dry mouth   [ ]ET Tube/Trach  [ ]Oral lesions  PULMONARY:   [ x]Clear [ ]Tachypnea  [ ]Audible excessive secretions   [ ]Rhonchi        [ ]Right [ ]Left [ ]Bilateral  [ ]Crackles        [ ]Right [ ]Left [ ]Bilateral  [ ]Wheezing     [ ]Right [ ]Left [ ]Bilatera  [ ]Diminished breath sounds [ ]right [ ]left [ ]bilateral  CARDIOVASCULAR:    [ x]Regular [ ]Irregular [ ]Tachy  [ ]Camron [ ]Murmur [ ]Other  GASTROINTESTINAL:  [x ]Soft  [ ]Distended   [ x]+BS  [x ]Non tender [ ]Tender  [ ]PEG [ ]OGT/ NGT  Last BM:   GENITOURINARY:  [ x]Normal [ ] Incontinent   [ ]Oliguria/Anuria   [ ]Beasley  MUSCULOSKELETAL:   [ ]Normal   [x ]Weakness  [ ]Bed/Wheelchair bound [ ]Edema  NEUROLOGIC:   [x ]No focal deficits  [ ]Cognitive impairment  [ ]Dysphagia [ ]Dysarthria [ ]Paresis [ ]Other   SKIN: +jaundice  [ ]Normal    [ ]Rash  [ ]Pressure ulcer(s)       Present on admission [ ]y [ ]n    CRITICAL CARE:  [ ] Shock Present  [ ]Septic [ ]Cardiogenic [ ]Neurologic [ ]Hypovolemic  [ ]  Vasopressors [ ]  Inotropes   [ ]Respiratory failure present [ ]Mechanical ventilation [ ]Non-invasive ventilatory support [ ]High flow  [ ]Acute  [ ]Chronic [ ]Hypoxic  [ ]Hypercarbic [ ]Other  [ ]Other organ failure     LABS:                        9.4    7.76  )-----------( 53       ( 02 Dec 2019 08:04 )             27.0   12-02    128<L>  |  87<L>  |  59<H>  ----------------------------<  119<H>  3.8   |  27  |  2.49<H>    Ca    10.1      02 Dec 2019 06:00    TPro  6.6  /  Alb  3.8  /  TBili  8.3<H>  /  DBili  x   /  AST  34  /  ALT  21  /  AlkPhos  131<H>  12-02  PT/INR - ( 02 Dec 2019 07:49 )   PT: 22.5 sec;   INR: 1.93 ratio        RADIOLOGY & ADDITIONAL STUDIES:  < from: CT Abdomen and Pelvis No Cont (11.26.19 @ 12:55) >    EXAM:  CT ABDOMEN AND PELVIS                          EXAM:  CT CHEST                            PROCEDURE DATE:  11/26/2019            INTERPRETATION:  CLINICAL INFORMATION: Cirrhosis. Recent fall. Assess for   traumatic injury.    COMPARISON: CT chest 10/26/2019, CT abdomen pelvis 10/23/2019    PROCEDURE:   CT of the Chest, Abdomen and Pelvis was performed without intravenous   contrast.   Intravenous contrast: None.  Oral contrast: None.  Sagittal and coronal reformats were performed.    FINDINGS:    CHEST:     LUNGS AND LARGE AIRWAYS: Patent central airways. No pulmonary nodules.  PLEURA: No pleural effusion.  VESSELS: Atherosclerotic changes of the aorta and coronary arteries.  HEART: Heart size is normal. No pericardial effusion.  MEDIASTINUM AND RONNIE: No lymphadenopathy.  CHEST WALL AND LOWER NECK: A new 1.5 cm rounded high density focus within   the left chest wall (3:22) from prior imaging 10/26/2019 may represent a   subcutaneous hematoma.    ABDOMEN AND PELVIS:    LIVER: Cirrhosis. Limited evaluation for focal lesion given lack of   intravenous contrast.  BILE DUCTS: Normal caliber.  GALLBLADDER: Within normal limits.  SPLEEN: Within normal limits.  PANCREAS: Within normal limits.  ADRENALS: Within normal limits.  KIDNEYS/URETERS: A 4 mm nonobstructing left renal calculus.    BLADDER: Within normal limits.  REPRODUCTIVE ORGANS: Prostate within normal limits.    BOWEL: No bowel obstruction. Appendix is normal.  PERITONEUM: No ascites.  VESSELS: Atherosclerotic changes.Upper abdominal varices.  RETROPERITONEUM/LYMPH NODES: No lymphadenopathy.    ABDOMINAL WALL: Within normal limits.  BONES: Degenerative changes.    IMPRESSION:     A new 1.5 cm rounded high density focus within the left chest wall from   prior imaging 10/26/2019 may represent a subcutaneous hematoma.   Otherwise, no evidence of acute traumatic injury.    Cirrhosis. No ascites.  RICHARD PUCKETT M.D., ATTENDING RADIOLOGIST  This document has been electronically signed. Nov26 2019  1:19PM     < end of copied text >    PROTEIN CALORIE MALNUTRITION PRESENT: [ x]mild [ ]moderate [ ]severe [ ]underweight [ ]morbid obesity  https://www.andeal.org/vault/2440/web/files/ONC/Table_Clinical%20Characteristics%20to%20Document%20Malnutrition-White%20JV%20et%20al%819107.pdf    Height (cm): 185.4 (11-26-19 @ 18:20), 182.9 (10-23-19 @ 00:00), 182.88 (04-03-19 @ 06:11)  Weight (kg): 115.5 (11-26-19 @ 18:20), 164.2 (10-23-19 @ 00:00), 163.3 (10-08-19 @ 17:41)  BMI (kg/m2): 33.6 (11-26-19 @ 18:20), 49.1 (10-23-19 @ 00:00), 48.8 (10-08-19 @ 17:41)    [x ]PPSV2 < or = to 30% [ ]significant weight loss  [ x]poor nutritional intake  [ ]anasarca     Albumin, Serum: 3.8 g/dL (12-02-19 @ 06:00)   [ ]Artificial Nutrition      REFERRALS:   [ ]Chaplaincy  [ ]Hospice  [ ]Child Life  [ ]Social Work  [ x]Case management [ ]Holistic Therapy     Goals of Care Document:

## 2019-12-02 NOTE — CONSULT NOTE ADULT - PROBLEM SELECTOR RECOMMENDATION 3
Advanced care planning was discussed with patient and family.  Advanced care planning forms were reviewed and discussed.  Risks, benefits and alternatives of gastroenterologic procedures were discussed in detail and all questions were answered.  30 minutes spent.
BP monitoring,continue current antihypertensive meds, low salt diet,followup with PMD in 1-2 weeks
patient wife is surrogate decision maker  plan for referral for transplant evaluation

## 2019-12-02 NOTE — PROGRESS NOTE ADULT - PROBLEM SELECTOR PLAN 2
- recent EGD 5/2019 non-bleeding duodenal ulcers and No evidence of varices   - cirrhosis 2/2 NAFLD   - US Abd without ascites; CT abd without new gi pathology; no liver lesions  - continue xifaxan and lactulose as above   - Protonix 40mg PO QD   - hepatology input appreciated; cont to follow their recs

## 2019-12-02 NOTE — PROGRESS NOTE ADULT - SUBJECTIVE AND OBJECTIVE BOX
Patient is a 63y Male whom presented to the hospital with ckd and dena   seen in am event is noted   PAST MEDICAL & SURGICAL HISTORY:  GIB (gastrointestinal bleeding)  GERD with esophagitis: Gastritis &amp; Non Bleeding Ulcers  Hepatic encephalopathy  Obesity  Fatty liver disease, nonalcoholic  Renal stones: 25 years ago  Hypertension  Neuropathy  Hypercholesteremia  Diabetes  S/P cholecystectomy      MEDICATIONS  (STANDING):  dextrose 5%. 1000 milliLiter(s) (50 mL/Hr) IV Continuous <Continuous>  dextrose 50% Injectable 12.5 Gram(s) IV Push once  dextrose 50% Injectable 25 Gram(s) IV Push once  dextrose 50% Injectable 25 Gram(s) IV Push once  insulin lispro (HumaLOG) corrective regimen sliding scale   SubCutaneous three times a day before meals  insulin lispro (HumaLOG) corrective regimen sliding scale   SubCutaneous at bedtime      Allergies    codeine (Anaphylaxis)    Intolerances    NO  RED MEAT (Unknown)      SOCIAL HISTORY:  Denies ETOh,Smoking,     FAMILY HISTORY:  Family history of type 2 diabetes mellitus  Family history of hypertension  Family history of stomach cancer      REVIEW OF SYSTEMS:    CONSTITUTIONAL: No weakness, fevers or chills  RESPIRATORY: No cough, wheezing, hemoptysis; No shortness of breath  CARDIOVASCULAR: No chest pain or palpitations  GASTROINTESTINAL: No abdominal or epigastric pain. No nausea, vomiting,     No diarrhea or constipation. No melena   GENITOURINARY: No dysuria, frequency or hematuria  NEUROLOGICAL: No numbness or weakness  SKIN: dry                                                       9.4    7.76  )-----------( 53       ( 02 Dec 2019 08:04 )             27.0       CBC Full  -  ( 02 Dec 2019 08:04 )  WBC Count : 7.76 K/uL  RBC Count : 2.77 M/uL  Hemoglobin : 9.4 g/dL  Hematocrit : 27.0 %  Platelet Count - Automated : 53 K/uL  Mean Cell Volume : 97.5 fl  Mean Cell Hemoglobin : 33.9 pg  Mean Cell Hemoglobin Concentration : 34.8 gm/dL  Auto Neutrophil # : x  Auto Lymphocyte # : x  Auto Monocyte # : x  Auto Eosinophil # : x  Auto Basophil # : x  Auto Neutrophil % : x  Auto Lymphocyte % : x  Auto Monocyte % : x  Auto Eosinophil % : x  Auto Basophil % : x      12-02    128<L>  |  87<L>  |  59<H>  ----------------------------<  119<H>  3.8   |  27  |  2.49<H>    Ca    10.1      02 Dec 2019 06:00    TPro  6.6  /  Alb  3.8  /  TBili  8.3<H>  /  DBili  x   /  AST  34  /  ALT  21  /  AlkPhos  131<H>  12-02      CAPILLARY BLOOD GLUCOSE      POCT Blood Glucose.: 167 mg/dL (02 Dec 2019 12:39)  POCT Blood Glucose.: 142 mg/dL (02 Dec 2019 08:28)  POCT Blood Glucose.: 190 mg/dL (01 Dec 2019 21:39)  POCT Blood Glucose.: 206 mg/dL (01 Dec 2019 17:20)      Vital Signs Last 24 Hrs  T(C): 36.8 (02 Dec 2019 12:42), Max: 36.8 (02 Dec 2019 12:42)  T(F): 98.2 (02 Dec 2019 12:42), Max: 98.2 (02 Dec 2019 12:42)  HR: 78 (02 Dec 2019 12:51) (75 - 85)  BP: 109/60 (02 Dec 2019 12:51) (105/61 - 110/66)  BP(mean): --  RR: 18 (02 Dec 2019 12:42) (18 - 18)  SpO2: 96% (02 Dec 2019 12:51) (96% - 97%)        PT/INR - ( 02 Dec 2019 07:49 )   PT: 22.5 sec;   INR: 1.93 ratio           PHYSICAL EXAM:    Constitutional: NAD  HEENT: conjunctive   clear   Neck:  No JVD  Respiratory: CTAB  Cardiovascular: S1 and S2  Gastrointestinal: BS+, soft, NT/ND  Extremities: No peripheral edema  Neurological:  no focal deficits  Psychiatric: Normal mood, normal affect  Skin: dry   Access: Not applicable

## 2019-12-02 NOTE — PROGRESS NOTE ADULT - ATTENDING COMMENTS
Hepatology Staff: Yomi Medina MD  I saw and examined the patient along with Dr. Del Real on 12/2/2019 .     Patient with decompensated cirrhosis presumably secondary to nonalcoholic steatohepatitis. Complications of his liver disease include ascites, history of SBP, history of hepatic encephalopathy, recent acute kidney injury. He was admitted with hepatic encephalopathy which has resolved and her and his mental status is at baseline.  MISA (possible underlying CKD). He is making good urine, but Urine Na > 90 ( was on diuretics at admission).  Less likely hepatorenal syndrome. He also appears orthostatic ? volume depleted vs adrenal insufficiency.  Would recommend checking 8 AM cortisol tomorrow morning. In addition I would recommend starting Midodrine 10 mg  t.i.d. and titrate to 20 mg t.i.d. if necessary.  OK to give 500 ml NS X 1.   Would also start IV Albumin 25% 25 g q 8hrs.  Hold diuretics.  He is calculated to MLED Na score is 33.   He will benefit evaluation for potential Liver Transplant. I will refer for a formal evaluation ( pending insurance approval)  Remove Mónica.

## 2019-12-02 NOTE — CONSULT NOTE ADULT - PROBLEM SELECTOR RECOMMENDATION 4
fs coverage
will defer goals of care conversation pending liver transplant evaluation  if patient deemed not a candidate or acute decompensation, please reconsult.  Discussed with NP and hepatology

## 2019-12-02 NOTE — PROGRESS NOTE ADULT - ASSESSMENT
63 year old male with HTN, DM2, non-alcoholic cirrhosis, who presents with altered mental status. We last saw him during a hospitalization in 10/2019, during which he was significantly volume overloaded and required iv diuresis.  He was initially confused though his mental status has improved. He remains with hyponatremia and high ammonia level.    Abnormal EKG  - EKG is notable for prolonged qtc which was still present on 11/30  - avoid qtc prolonging medications  - replete K  - check daily mg as well  - Monitor and replete electrolytes. Keep K>4.0 and Mg>2.0.   - repeat daily EKG    Diastolic HF   - no sign of acute ischemia  - no significant volume overload on exam. CT without pleural effusions or edema, though notable for possible chest wall hematoma.  - can hold diuretics in the short term   - GI and renal follow up.  - TTE normal LV systolic function EF 65%; LVH, DD Grade I    HTN  - BP on softer side  - hold diuretics and anti-hypertensives  - when able start BB    - cont statin for cad/atherosclerosis noted on ct  - lfts remain within normal limits    - Watch creatinine and electrolytes. Keep K>4, mg>2  - All other workup as per primary team  - Will follow with you. Further cardiac w/u as indicated by clinical course

## 2019-12-02 NOTE — PROGRESS NOTE ADULT - SUBJECTIVE AND OBJECTIVE BOX
SUBJECTIVE / OVERNIGHT EVENTS: pt denies headache, nausea,v   chest pain, shortness of breath more confused than yesterday   + bms    MEDICATIONS  (STANDING):  albumin human 25% IVPB 100 milliLiter(s) IV Intermittent every 8 hours  atorvastatin 10 milliGRAM(s) Oral at bedtime  dextrose 5%. 1000 milliLiter(s) (50 mL/Hr) IV Continuous <Continuous>  dextrose 50% Injectable 12.5 Gram(s) IV Push once  dextrose 50% Injectable 25 Gram(s) IV Push once  dextrose 50% Injectable 25 Gram(s) IV Push once  insulin glargine Injectable (LANTUS) 20 Unit(s) SubCutaneous at bedtime  insulin lispro (HumaLOG) corrective regimen sliding scale   SubCutaneous three times a day before meals  insulin lispro (HumaLOG) corrective regimen sliding scale   SubCutaneous at bedtime  lactulose Syrup 30 Gram(s) Oral every 4 hours  midodrine. 10 milliGRAM(s) Oral three times a day  pantoprazole    Tablet 40 milliGRAM(s) Oral before breakfast  rifAXIMin 550 milliGRAM(s) Oral two times a day  sodium chloride 2 Gram(s) Oral every 6 hours  sucralfate 1 Gram(s) Oral two times a day  tamsulosin 0.4 milliGRAM(s) Oral at bedtime    MEDICATIONS  (PRN):  dextrose 40% Gel 15 Gram(s) Oral once PRN Blood Glucose LESS THAN 70 milliGRAM(s)/deciliter  glucagon  Injectable 1 milliGRAM(s) IntraMuscular once PRN Glucose LESS THAN 70 milligrams/deciliter    Vital Signs Last 24 Hrs  T(C): 36.8 (02 Dec 2019 12:42), Max: 36.8 (02 Dec 2019 12:42)  T(F): 98.2 (02 Dec 2019 12:42), Max: 98.2 (02 Dec 2019 12:42)  HR: 78 (02 Dec 2019 12:51) (75 - 85)  BP: 109/60 (02 Dec 2019 12:51) (105/61 - 110/66)  BP(mean): --  RR: 18 (02 Dec 2019 12:42) (18 - 18)  SpO2: 96% (02 Dec 2019 12:51) (96% - 97%)    Constitutional: No fever, fatigue  Skin: No rash.  Eyes: No recent vision problems or eye pain.  ENT: No congestion, ear pain, or sore throat.  Cardiovascular: No chest pain or palpation.  Respiratory: No cough, shortness of breath, congestion, or wheezing.  Gastrointestinal: No abdominal pain, nausea, vomiting, or diarrhea.  Genitourinary: No dysuria.  Musculoskeletal: No joint swelling.  Neurologic: No headache.    PHYSICAL EXAM:  GENERAL: NAD  EYES: EOMI, PERRLA  NECK: Supple, No JVD  CHEST/LUNG: dec breath sounds at bases   HEART:  S1 , S2 +  ABDOMEN: soft , bs+, mild distension + , no tenderness  EXTREMITIES:  no edema  NEUROLOGY:alert awake oriented   flap tremor+    LABS:      128<L>  |  87<L>  |  59<H>  ----------------------------<  119<H>  3.8   |  27  |  2.49<H>    Ca    10.1      02 Dec 2019 06:00    TPro  6.6  /  Alb  3.8  /  TBili  8.3<H>  /  DBili      /  AST  34  /  ALT  21  /  AlkPhos  131<H>      Creatinine Trend: 2.49 <--, 2.47 <--, 2.50 <--, 2.29 <--, 2.31 <--, 2.55 <--, 2.59 <--, 2.70 <--, 2.68 <--, 2.63 <--, 2.67 <--, 2.61 <--, 2.55 <--                        9.4    7.76  )-----------( 53       ( 02 Dec 2019 08:04 )             27.0     Urine Studies:  Urinalysis Basic - ( 2019 08:42 )    Color: Yellow / Appearance: Slightly Turbid / S.013 / pH:   Gluc:  / Ketone: Negative  / Bili: Negative / Urobili: <2 mg/dL   Blood:  / Protein: Trace / Nitrite: Negative   Leuk Esterase: Negative / RBC: 100 /HPF / WBC 4 /HPF   Sq Epi:  / Non Sq Epi: 0 /HPF / Bacteria: Negative      Sodium, Random Urine: <35 mmol/L ( @ 15:18)  Creatinine, Random Urine: 99 mg/dL ( @ 15:18)  Osmolality, Random Urine: 335 mosm/Kg ( @ 00:20)  Creatinine, Random Urine: 67 mg/dL ( 23:21)  Creatinine, Random Urine: 68 mg/dL ( 21:19)  Protein/Creatinine Ratio Calculation: 0.2 Ratio ( @ 21:19)  Potassium, Random Urine: 37 mmol/L ( @ 21:19)  Sodium, Random Urine: 61 mmol/L ( 21:19)          LIVER FUNCTIONS - ( 02 Dec 2019 06:00 )  Alb: 3.8 g/dL / Pro: 6.6 g/dL / ALK PHOS: 131 U/L / ALT: 21 U/L / AST: 34 U/L / GGT: x           PT/INR - ( 02 Dec 2019 07:49 )   PT: 22.5 sec;   INR: 1.93 ratio             Osmolality, Random Urine: 335 mosm/Kg ( @ 00:20)  Creatinine, Random Urine: 67 mg/dL ( 23:21)  Creatinine, Random Urine: 68 mg/dL ( 21:19)  Protein/Creatinine Ratio Calculation: 0.2 Ratio (:19)  Potassium, Random Urine: 37 mmol/L (:19)  Sodium, Random Urine: 61 mmol/L ( 21:19)          LIVER FUNCTIONS - ( 01 Dec 2019 06:13 )  Alb: 4.0 g/dL / Pro: 6.9 g/dL / ALK PHOS: 123 U/L / ALT: 21 U/L / AST: 35 U/L / GGT: x           PT/INR - ( 01 Dec 2019 06:13 )   PT: 22.5 sec;   INR: 1.92 ratio         PTT - ( 2019 07:09 )  PTT:41.6 sec  Osmolality, Random Urine: 335 mosm/Kg ( @ 00:20)  Creatinine, Random Urine: 67 mg/dL ( 23:21)  Creatinine, Random Urine: 68 mg/dL ( @ 21:19)  Protein/Creatinine Ratio Calculation: 0.2 Ratio (:19)  Potassium, Random Urine: 37 mmol/L (:19)  Sodium, Random Urine: 61 mmol/L ( 21:19)          LIVER FUNCTIONS - ( 2019 07:09 )  Alb: 4.0 g/dL / Pro: 6.7 g/dL / ALK PHOS: 121 U/L / ALT: 21 U/L / AST: 31 U/L / GGT: x           PT/INR - ( 2019 07:09 )   PT: 23.5 sec;   INR: 2.02 ratio         PTT - ( 2019 07:09 )  PTT:41.6 sec       PTT - ( 2019 08:22 )  PTT:37.6 sec  Color: Yellow / Appearance: Slightly Turbid / S.013 / pH:   Gluc:  / Ketone: Negative  / Bili: Negative / Urobili: <2 mg/dL   Blood:  / Protein: Trace / Nitrite: Negative   Leuk Esterase: Negative / RBC: 100 /HPF / WBC 4 /HPF   Sq Epi:  / Non Sq Epi: 0 /HPF / Bacteria: Negative      Osmolality, Random Urine: 335 mosm/Kg ( @ 00:20)  Creatinine, Random Urine: 67 mg/dL ( @ 23:21)  Creatinine, Random Urine: 68 mg/dL ( @ 21:19)  Protein/Creatinine Ratio Calculation: 0.2 Ratio ( @ 21:19)  Potassium, Random Urine: 37 mmol/L ( @ 21:19)  Sodium, Random Urine: 61 mmol/L ( @ 21:19)          LIVER FUNCTIONS - ( 2019 06:29 )  Alb: 3.0 g/dL / Pro: 6.9 g/dL / ALK PHOS: 182 U/L / ALT: 23 U/L / AST: 36 U/L / GGT: x           PT/INR - ( 2019 08:38 )   PT: 21.8 sec;   INR: 1.88 ratio         PTT - ( 2019 08:38 )  PTT:36.8 sec  Blood: x / Protein: Negative / Nitrite: Negative   Leuk Esterase: Negative / RBC: 14 /HPF / WBC 4 /HPF   Sq Epi: x / Non Sq Epi: 0 /HPF / Bacteria: Negative        RADIOLOGY & ADDITIONAL TESTS:    Imaging Personally Reviewed:    Consultant(s) Notes Reviewed:      Care Discussed with Consultants/Other Providers:

## 2019-12-02 NOTE — PROGRESS NOTE ADULT - ASSESSMENT
pt with above history p/w difficulty ambulation / weakness       - flap tremor + on Exam , likely hepatic encephalopathy -  pt having bms after lactulose enema, monitor mental status closely     - hyperbilirubinemia-GI f/u , cont rifaximin     - ARF on ckd stage 3 - likely hepatorenal , avoid nephrotoxic agents bladder scan to r/o retention , renal f/u - pt on aldactone+ Lasix+ zaroxolyn - pt doesn't appear fluid overloaded at present, hold diuretics, gentle iv fluids with close monitor of pts volume status    - dm2 - iss     - Thrombocytopenia - likely sec to liver pathology     - coagulopathy - likely sec to liver pathology , monitor closely for active signs of bleeding , vit k     - HLD - pt om parvastaTIN     had extensive lengthy discussion with pts wife /sons about pts current clinical status, management plan &  prognosis - guarded prognosis - palliative eval    ppi

## 2019-12-02 NOTE — PROGRESS NOTE ADULT - SUBJECTIVE AND OBJECTIVE BOX
Chief Complaint:  Patient is a 63y old  Male who presents with a chief complaint of difficulty to ambulate/ weakness (02 Dec 2019 09:22)      Interval Events: No adverse events overnight.  5BMs recorded yesterday with lactulose administration.     Allergies:  codeine (Anaphylaxis)  NO  RED MEAT (Unknown)      Hospital Medications:  atorvastatin 10 milliGRAM(s) Oral at bedtime  dextrose 40% Gel 15 Gram(s) Oral once PRN  dextrose 5%. 1000 milliLiter(s) IV Continuous <Continuous>  dextrose 50% Injectable 12.5 Gram(s) IV Push once  dextrose 50% Injectable 25 Gram(s) IV Push once  dextrose 50% Injectable 25 Gram(s) IV Push once  glucagon  Injectable 1 milliGRAM(s) IntraMuscular once PRN  insulin glargine Injectable (LANTUS) 20 Unit(s) SubCutaneous at bedtime  insulin lispro (HumaLOG) corrective regimen sliding scale   SubCutaneous three times a day before meals  insulin lispro (HumaLOG) corrective regimen sliding scale   SubCutaneous at bedtime  lactulose Syrup 30 Gram(s) Oral every 4 hours  pantoprazole    Tablet 40 milliGRAM(s) Oral before breakfast  rifAXIMin 550 milliGRAM(s) Oral two times a day  sucralfate 1 Gram(s) Oral two times a day  tamsulosin 0.4 milliGRAM(s) Oral at bedtime  thiamine IVPB 500 milliGRAM(s) IV Intermittent three times a day  ursodiol Capsule 300 milliGRAM(s) Oral two times a day      PMHX/PSHX:  GIB (gastrointestinal bleeding)  GERD with esophagitis  Hepatic encephalopathy  Obesity  Fatty liver disease, nonalcoholic  Renal stones  Hypertension  Neuropathy  Hypercholesteremia  Diabetes  S/P cholecystectomy  No significant past surgical history      Family history:  Family history of type 2 diabetes mellitus  Family history of hypertension  Family history of stomach cancer  No pertinent family history in first degree relatives      ROS:     General:  No wt loss, fevers, chills, night sweats, fatigue,   Eyes:  Good vision, no reported pain  ENT:  No sore throat, pain, runny nose, dysphagia  CV:  No pain, palpitations, hypo/hypertension  Resp:  No dyspnea, cough, tachypnea, wheezing  GI:  See HPI  :  No pain, bleeding, incontinence, nocturia  Muscle:  No pain, weakness  Neuro:  No weakness, tingling, memory problems  Psych:  No fatigue, insomnia, mood problems, depression  Endocrine:  No polyuria, polydipsia, cold/heat intolerance  Heme:  No petechiae, ecchymosis, easy bruisability  Skin:  No rash, edema      PHYSICAL EXAM:     GENERAL: NAD  HEENT:  NC/AT  CHEST:  Full & symmetric excursion, no increased effort  ABDOMEN:  Soft, non-tender, non-distended, +BS  EXTREMITIES:  no edema  SKIN:  No rash  NEURO:  Alert      Vital Signs:  Vital Signs Last 24 Hrs  T(C): 36.6 (02 Dec 2019 04:24), Max: 36.9 (01 Dec 2019 12:25)  T(F): 97.9 (02 Dec 2019 04:24), Max: 98.5 (01 Dec 2019 12:25)  HR: 78 (02 Dec 2019 04:24) (78 - 85)  BP: 107/63 (02 Dec 2019 04:24) (75/44 - 110/66)  BP(mean): --  RR: 18 (02 Dec 2019 04:24) (18 - 18)  SpO2: 97% (02 Dec 2019 04:24) (96% - 99%)  Daily     Daily     LABS:                        9.5    6.95  )-----------( 62       ( 01 Dec 2019 06:13 )             26.9     12-02    128<L>  |  87<L>  |  59<H>  ----------------------------<  119<H>  3.8   |  27  |  2.49<H>    Ca    10.1      02 Dec 2019 06:00    TPro  6.6  /  Alb  3.8  /  TBili  8.3<H>  /  DBili  x   /  AST  34  /  ALT  21  /  AlkPhos  131<H>  12-02    LIVER FUNCTIONS - ( 02 Dec 2019 06:00 )  Alb: 3.8 g/dL / Pro: 6.6 g/dL / ALK PHOS: 131 U/L / ALT: 21 U/L / AST: 34 U/L / GGT: x           PT/INR - ( 02 Dec 2019 07:49 )   PT: 22.5 sec;   INR: 1.93 ratio             Amylase Serum--      Lipase serum--       [id number]      Imaging:  reviewed Chief Complaint:  Patient is a 63y old  Male who presents with a chief complaint of difficulty to ambulate/ weakness (02 Dec 2019 09:22)      Interval Events: No adverse events overnight.  5BMs recorded yesterday with lactulose administration. Mentation improved.    Allergies:  codeine (Anaphylaxis)  NO  RED MEAT (Unknown)      Hospital Medications:  atorvastatin 10 milliGRAM(s) Oral at bedtime  dextrose 40% Gel 15 Gram(s) Oral once PRN  dextrose 5%. 1000 milliLiter(s) IV Continuous <Continuous>  dextrose 50% Injectable 12.5 Gram(s) IV Push once  dextrose 50% Injectable 25 Gram(s) IV Push once  dextrose 50% Injectable 25 Gram(s) IV Push once  glucagon  Injectable 1 milliGRAM(s) IntraMuscular once PRN  insulin glargine Injectable (LANTUS) 20 Unit(s) SubCutaneous at bedtime  insulin lispro (HumaLOG) corrective regimen sliding scale   SubCutaneous three times a day before meals  insulin lispro (HumaLOG) corrective regimen sliding scale   SubCutaneous at bedtime  lactulose Syrup 30 Gram(s) Oral every 4 hours  pantoprazole    Tablet 40 milliGRAM(s) Oral before breakfast  rifAXIMin 550 milliGRAM(s) Oral two times a day  sucralfate 1 Gram(s) Oral two times a day  tamsulosin 0.4 milliGRAM(s) Oral at bedtime  thiamine IVPB 500 milliGRAM(s) IV Intermittent three times a day  ursodiol Capsule 300 milliGRAM(s) Oral two times a day      PMHX/PSHX:  GIB (gastrointestinal bleeding)  GERD with esophagitis  Hepatic encephalopathy  Obesity  Fatty liver disease, nonalcoholic  Renal stones  Hypertension  Neuropathy  Hypercholesteremia  Diabetes  S/P cholecystectomy  No significant past surgical history      Family history:  Family history of type 2 diabetes mellitus  Family history of hypertension  Family history of stomach cancer  No pertinent family history in first degree relatives      ROS:     General:  No wt loss, fevers, chills, night sweats, fatigue,   Eyes:  Good vision, no reported pain  ENT:  No sore throat, pain, runny nose, dysphagia  CV:  No pain, palpitations, hypo/hypertension  Resp:  No dyspnea, cough, tachypnea, wheezing  GI:  See HPI  :  No pain, bleeding, incontinence, nocturia  Muscle:  No pain, weakness  Neuro:  No weakness, tingling, memory problems  Psych:  No fatigue, insomnia, mood problems, depression  Endocrine:  No polyuria, polydipsia, cold/heat intolerance  Heme:  No petechiae, ecchymosis, easy bruisability  Skin:  No rash, edema      PHYSICAL EXAM:     GENERAL: NAD  HEENT:  NC/AT  CHEST:  Full & symmetric excursion, no increased effort  ABDOMEN:  Soft, non-tender, non-distended, +BS  EXTREMITIES:  no edema  SKIN:  No rash  NEURO:  Alert      Vital Signs:  Vital Signs Last 24 Hrs  T(C): 36.6 (02 Dec 2019 04:24), Max: 36.9 (01 Dec 2019 12:25)  T(F): 97.9 (02 Dec 2019 04:24), Max: 98.5 (01 Dec 2019 12:25)  HR: 78 (02 Dec 2019 04:24) (78 - 85)  BP: 107/63 (02 Dec 2019 04:24) (75/44 - 110/66)  BP(mean): --  RR: 18 (02 Dec 2019 04:24) (18 - 18)  SpO2: 97% (02 Dec 2019 04:24) (96% - 99%)  Daily     Daily     LABS:                        9.5    6.95  )-----------( 62       ( 01 Dec 2019 06:13 )             26.9     12-02    128<L>  |  87<L>  |  59<H>  ----------------------------<  119<H>  3.8   |  27  |  2.49<H>    Ca    10.1      02 Dec 2019 06:00    TPro  6.6  /  Alb  3.8  /  TBili  8.3<H>  /  DBili  x   /  AST  34  /  ALT  21  /  AlkPhos  131<H>  12-02    LIVER FUNCTIONS - ( 02 Dec 2019 06:00 )  Alb: 3.8 g/dL / Pro: 6.6 g/dL / ALK PHOS: 131 U/L / ALT: 21 U/L / AST: 34 U/L / GGT: x           PT/INR - ( 02 Dec 2019 07:49 )   PT: 22.5 sec;   INR: 1.93 ratio             Amylase Serum--      Lipase serum--       [id number]      Imaging:  reviewed

## 2019-12-02 NOTE — CONSULT NOTE ADULT - ASSESSMENT
63M with HTN, IDDM, BPH, obesity, ESLD 2/2 JEAN cirrhosis (+HE, ascites with SBP) admitted from [facility] on 11/26 due to HE/MISA.    JEAN cirrhosis.  MELD 33 today      - varices: none on EGD outside in 10/2019      - HE: present, on lactulose and Xifaxan but not responding to therapy      - ascites: none currently, but history of ascites with SBP      - HCC: none on US 11/27, AFP 23 in 10/19      - Vascular assessment: poor US doppler exam for eval of vessels due to body habitus       - Liver Workup: HAV/HBV/HCV immunostudies neg, HIV neg, iron studies continue with AoCD      - Age appropriate screening: colonoscopy 5-6 months ago per patient      - Dental evaluation: >1 year ago, no loose teeth per patient      - Cardiac evaluation: poor study due to habitus but EF 65%, mild pHTN on TTE 10/25/19      - Blood type A+, Ab neg      - Previous transplant workup: none      - Social: retired, previous , lives in [place] with wife and son, previously highly functional (before October), Insurance -Medicare HMO    Transplant evaluation w/u explained at length at bedside with Dr. Ackerman/Marina/Adam.  Pt was AAOx3 and has agreed for our team to proceed with the evaluation.  All questions and concerns answered at bedside.  Consented today, will start evaluation process tomorrow. Outpatient team aware.    Additional studies ordered with AM labs. Will follow. Continue care per primary team and  Hepatology.    Please do not hesitate to contact our team with any questions or concerns at [phone]

## 2019-12-02 NOTE — PROGRESS NOTE ADULT - PROBLEM SELECTOR PLAN 1
- improving  - likely hepatorenal syndrome; renal input appreciated  - titrate lactulose for 2-3BM/day; please give Lactulose Enemas if pt unable to tolerate PO intake   - continue xifaxan bid   - diet as tolerated with aspiration precautions  - fu palliative recs

## 2019-12-02 NOTE — PROGRESS NOTE ADULT - SUBJECTIVE AND OBJECTIVE BOX
[facility] - Sushila Dhaliwal, Doug, Morenita, Ayo, Colleen Williamson  Office Number:  [phone]    Patient resting comfortably in bed in NAD.  Laying flat with no respiratory distress.  No complaints of chest pain, dyspnea, palpitations, PND, or orthopnea.  feeling much better    ROS: negative unless otherwise mentioned.    Telemetry: off    MEDICATIONS  (STANDING):  atorvastatin 10 milliGRAM(s) Oral at bedtime  dextrose 5%. 1000 milliLiter(s) (50 mL/Hr) IV Continuous <Continuous>  dextrose 50% Injectable 12.5 Gram(s) IV Push once  dextrose 50% Injectable 25 Gram(s) IV Push once  dextrose 50% Injectable 25 Gram(s) IV Push once  insulin glargine Injectable (LANTUS) 20 Unit(s) SubCutaneous at bedtime  insulin lispro (HumaLOG) corrective regimen sliding scale   SubCutaneous three times a day before meals  insulin lispro (HumaLOG) corrective regimen sliding scale   SubCutaneous at bedtime  lactulose Syrup 30 Gram(s) Oral every 4 hours  pantoprazole    Tablet 40 milliGRAM(s) Oral before breakfast  rifAXIMin 550 milliGRAM(s) Oral two times a day  sucralfate 1 Gram(s) Oral two times a day  tamsulosin 0.4 milliGRAM(s) Oral at bedtime  thiamine IVPB 500 milliGRAM(s) IV Intermittent three times a day  ursodiol Capsule 300 milliGRAM(s) Oral two times a day    MEDICATIONS  (PRN):  dextrose 40% Gel 15 Gram(s) Oral once PRN Blood Glucose LESS THAN 70 milliGRAM(s)/deciliter  glucagon  Injectable 1 milliGRAM(s) IntraMuscular once PRN Glucose LESS THAN 70 milligrams/deciliter      Allergies    codeine (Anaphylaxis)    Intolerances    NO  RED MEAT (Unknown)      Vital Signs Last 24 Hrs  T(C): 36.6 (02 Dec 2019 04:24), Max: 36.9 (01 Dec 2019 12:25)  T(F): 97.9 (02 Dec 2019 04:24), Max: 98.5 (01 Dec 2019 12:25)  HR: 78 (02 Dec 2019 04:24) (78 - 85)  BP: 107/63 (02 Dec 2019 04:24) (75/44 - 110/66)  BP(mean): --  RR: 18 (02 Dec 2019 04:24) (18 - 18)  SpO2: 97% (02 Dec 2019 04:24) (96% - 99%)    I&O's Summary    01 Dec 2019 07:01  -  02 Dec 2019 07:00  --------------------------------------------------------  IN: 720 mL / OUT: 1700 mL / NET: -980 mL        ON EXAM:    Constitutional: NAD, awake and alert, well-developed, obese  HEENT: Moist Mucous Membranes, Anicteric  Pulmonary: Decreased breath sounds b/l. No rales, crackles or wheeze appreciated.   Cardiovascular: Regular, S1 and S2, No murmurs, rubs, gallops or clicks  Gastrointestinal: Bowel Sounds present, soft, nontender.   Lymph: No peripheral edema. No lymphadenopathy.  Skin: No visible rashes or ulcers.  Psych:  Mood & affect appropriate for situation    LABS: All Labs Reviewed:                        9.5    6.95  )-----------( 62       ( 01 Dec 2019 06:13 )             26.9                         9.1    6.27  )-----------( 51       ( 30 Nov 2019 07:17 )             26.0     02 Dec 2019 06:00    128    |  87     |  59     ----------------------------<  119    3.8     |  27     |  2.49   01 Dec 2019 06:13    131    |  88     |  67     ----------------------------<  102    3.7     |  27     |  2.47   30 Nov 2019 19:39    131    |  89     |  66     ----------------------------<  127    3.5     |  27     |  2.50     Ca    10.1       02 Dec 2019 06:00  Ca    10.5       01 Dec 2019 06:13  Ca    10.6       30 Nov 2019 19:39    TPro  6.6    /  Alb  3.8    /  TBili  8.3    /  DBili  x      /  AST  34     /  ALT  21     /  AlkPhos  131    02 Dec 2019 06:00  TPro  6.9    /  Alb  4.0    /  TBili  8.7    /  DBili  x      /  AST  35     /  ALT  21     /  AlkPhos  123    01 Dec 2019 06:13  TPro  6.7    /  Alb  4.0    /  TBili  8.2    /  DBili  x      /  AST  31     /  ALT  21     /  AlkPhos  121    30 Nov 2019 07:09    PT/INR - ( 02 Dec 2019 07:49 )   PT: 22.5 sec;   INR: 1.93 ratio               Blood Culture: Organism --  Gram Stain Blood -- Gram Stain --  Specimen Source .Blood Blood-Peripheral  Culture-Blood --

## 2019-12-02 NOTE — CONSULT NOTE ADULT - PROBLEM SELECTOR PROBLEM 1
Hepatic encephalopathy
MISA (acute kidney injury)
Cirrhosis of liver without ascites, unspecified hepatic cirrhosis type

## 2019-12-02 NOTE — PROGRESS NOTE ADULT - SUBJECTIVE AND OBJECTIVE BOX
INTERVAL HPI/OVERNIGHT EVENTS:    mentating well this morning   having bowel movements   no abd pain   no n/v  no tremors    MEDICATIONS  (STANDING):  atorvastatin 10 milliGRAM(s) Oral at bedtime  dextrose 5%. 1000 milliLiter(s) (50 mL/Hr) IV Continuous <Continuous>  dextrose 50% Injectable 12.5 Gram(s) IV Push once  dextrose 50% Injectable 25 Gram(s) IV Push once  dextrose 50% Injectable 25 Gram(s) IV Push once  insulin glargine Injectable (LANTUS) 20 Unit(s) SubCutaneous at bedtime  insulin lispro (HumaLOG) corrective regimen sliding scale   SubCutaneous three times a day before meals  insulin lispro (HumaLOG) corrective regimen sliding scale   SubCutaneous at bedtime  lactulose Syrup 30 Gram(s) Oral every 4 hours  pantoprazole    Tablet 40 milliGRAM(s) Oral before breakfast  rifAXIMin 550 milliGRAM(s) Oral two times a day  sodium chloride 0.9%. 1000 milliLiter(s) (50 mL/Hr) IV Continuous <Continuous>  sucralfate 1 Gram(s) Oral two times a day  tamsulosin 0.4 milliGRAM(s) Oral at bedtime  thiamine IVPB 500 milliGRAM(s) IV Intermittent three times a day  ursodiol Capsule 300 milliGRAM(s) Oral two times a day    MEDICATIONS  (PRN):  dextrose 40% Gel 15 Gram(s) Oral once PRN Blood Glucose LESS THAN 70 milliGRAM(s)/deciliter  glucagon  Injectable 1 milliGRAM(s) IntraMuscular once PRN Glucose LESS THAN 70 milligrams/deciliter      Allergies    codeine (Anaphylaxis)    Intolerances    NO  RED MEAT (Unknown)      Review of Systems:    General:  No wt loss, fevers, chills, night sweats, fatigue   Eyes:  Good vision, no reported pain  ENT:  No sore throat, pain, runny nose, dysphagia  CV:  No pain, palpitations, hypo/hypertension  Resp:  No dyspnea, cough, tachypnea, wheezing  GI:  No pain, No nausea, No vomiting, No diarrhea, No constipation, No weight loss, No fever, No pruritis, No rectal bleeding, No melena, No dysphagia  :  No pain, bleeding, incontinence, nocturia  Muscle:  No pain, weakness  Neuro:  No weakness, tingling, memory problems  Psych:  No fatigue, insomnia, mood problems, depression  Endocrine:  No polyuria, polydypsia, cold/heat intolerance  Heme:  No petechiae, ecchymosis, easy bruisability  Skin:  No rash, tattoos, scars, edema      Vital Signs Last 24 Hrs  T(C): 36.6 (02 Dec 2019 04:24), Max: 36.6 (02 Dec 2019 04:24)  T(F): 97.9 (02 Dec 2019 04:24), Max: 97.9 (02 Dec 2019 04:24)  HR: 78 (02 Dec 2019 04:24) (78 - 85)  BP: 107/63 (02 Dec 2019 04:24) (107/63 - 110/66)  BP(mean): --  RR: 18 (02 Dec 2019 04:24) (18 - 18)  SpO2: 97% (02 Dec 2019 04:24) (97% - 97%)    PHYSICAL EXAM:    Constitutional: NAD  HEENT: EOMI, throat clear  Neck: No LAD, supple  Respiratory: CTA and P  Cardiovascular: S1 and S2, RRR, no M  Gastrointestinal: BS+, soft, NT/ND, neg HSM,  Extremities: No peripheral edema, neg clubbing, cyanosis  Vascular: 2+ peripheral pulses  Neurological: A/O x 3, no focal deficits  Psychiatric: Normal mood, normal affect  Skin: No rashes icterus, jaundice      LABS:                        9.4    7.76  )-----------( 53       ( 02 Dec 2019 08:04 )             27.0     12-02    128<L>  |  87<L>  |  59<H>  ----------------------------<  119<H>  3.8   |  27  |  2.49<H>    Ca    10.1      02 Dec 2019 06:00    TPro  6.6  /  Alb  3.8  /  TBili  8.3<H>  /  DBili  x   /  AST  34  /  ALT  21  /  AlkPhos  131<H>  12-02    PT/INR - ( 02 Dec 2019 07:49 )   PT: 22.5 sec;   INR: 1.93 ratio               RADIOLOGY & ADDITIONAL TESTS:

## 2019-12-02 NOTE — PROGRESS NOTE ADULT - ASSESSMENT
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    #Hepatic encephalopathy, persistent.  No signs of infection, clot.  ?Home med compliance  #Presumed JEAN cirrhosis, MELD-Na 33 on 12/2      - varices: none on EGD outside in 10/2019      - HE: present, on lactulose and Xifaxan but not responding to therapy      - ascites: none currently, but history of ascites with SBP      - HCC: none on US 11/27, AFP 23 in 10/19      - Vascular assessment: poor US doppler exam for eval of vessels due to body habitus       - Liver Workup: HAV/HBV/HCV immunostudies neg, HIV neg, iron studies continue with AoCD      - Age appropriate screening: unknown colonoscopy date      - Cardiac evaluation: poor study due to habitus but EF 65%, mild pHTN on TTE 10/25/19      - Blood type A+, Ab neg      - Previous transplant workup: none  # MISA, Cr stable over last few days around 2.5, per neprhology was possibly prerenal on admission due to overdiuresis? Baseline in 10/2019 1.2)  # Chest wall hematoma  # T2DM  # Hypertension (currently normotensive)    Recs:  - continue lactulose 4x daily for now, continue Xifaxan BID for encephalopathy  - can complete thiamine trial for possible Wernicke's though unlikely given lack of response  - hold diuretics given patient is euvolemic, MISA  - ok to stop ursodiol as elevated bilirubin likely related to liver disease (and possibly hematoma)  - would obtain last colonoscopy report (need up-to-date age appropriate screening if eligible for transplant)  - please check the following lab tests:  Quant Gold, Hemoglobin A1c, TSH, RPR, AMA, SRAVAN, ASMA, LKM Antibody, ceruloplasmin, SPEP, UPEP, urine alcohol metabolites, HBV DNA quantitative, HCV RNA PCR (quantitative) Impression:  64yo M with Hypertension, DMT2, BPH, obesity, HFpEF grade 1, obesity, 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    #Hepatic encephalopathy, persistent.  No signs of infection, clot.  ?Home med compliance  #Presumed JEAN cirrhosis, MELD-Na 33 on 12/2      - varices: none on EGD outside in 10/2019      - HE: present, on lactulose and Xifaxan but not responding to therapy      - ascites: none currently, but history of ascites with SBP      - HCC: none on US 11/27, AFP 23 in 10/19      - Vascular assessment: poor US doppler exam for eval of vessels due to body habitus       - Liver Workup: HAV/HBV/HCV immunostudies neg, HIV neg, iron studies continue with AoCD      - Age appropriate screening: unknown colonoscopy date      - Cardiac evaluation: poor study due to habitus but EF 65%, mild pHTN on TTE 10/25/19      - Blood type A+, Ab neg      - Previous transplant workup: none  # MISA, Cr stable over last few days around 2.5, per neprhology was possibly prerenal on admission due to overdiuresis? Baseline in 10/2019 1.2)  # Chest wall hematoma  # T2DM  # Hypertension (currently normotensive)    Recs:  - continue lactulose 4x daily for now, continue Xifaxan BID for encephalopathy  - can complete thiamine trial for possible Wernicke's though unlikely given lack of response  - hold diuretics given patient is euvolemic, MISA  - ok to stop ursodiol as elevated bilirubin likely related to liver disease (and possibly hematoma)  - would obtain last colonoscopy report (need up-to-date age appropriate screening if eligible for transplant)  - please check the following lab tests:  Quant Gold, Hemoglobin A1c, TSH, RPR, AMA, SRAVAN, ASMA, LKM Antibody, ceruloplasmin, SPEP, UPEP, urine alcohol metabolites, HBV surface Ab, HBV DNA quantitative, HCV RNA PCR (quantitative) Impression:  62yo M with Hypertension, DMT2, BPH, obesity, HFpEF grade 1, obesity, 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    #Hepatic encephalopathy, persistent.  No signs of infection, clot.  ?Home med compliance  #Presumed JEAN cirrhosis, MELD-Na 33 on 12/2      - varices: none on EGD outside in 10/2019      - HE: present, on lactulose and Xifaxan but not responding to therapy      - ascites: none currently, but history of ascites with SBP      - HCC: none on US 11/27, AFP 23 in 10/19      - Vascular assessment: poor US doppler exam for eval of vessels due to body habitus       - Liver Workup: HAV/HBV/HCV immunostudies neg, HIV neg, iron studies continue with AoCD      - Age appropriate screening: colonoscopy 5-6 months ago per patient      - Dental evaluation: >1 year ago, no loose teeth per patient      - Cardiac evaluation: poor study due to habitus but EF 65%, mild pHTN on TTE 10/25/19      - Blood type A+, Ab neg      - Previous transplant workup: none      - Social: retired, previous , lives in [place] with wife and son, previously highly functional (before October), Insurance -Medicare HMO  # MSIA, Cr stable over last few days around 2.5, per neprhology was possibly prerenal on admission due to overdiuresis? Baseline in 10/2019 1.2)  # Chest wall hematoma  # T2DM  # Hypertension (currently normotensive)    Recs:  - continue lactulose 4x daily for now, continue Xifaxan BID for encephalopathy  - can complete thiamine trial for possible Wernicke's though unlikely given lack of response  - hold diuretics given patient is euvolemic, MISA  - ok to stop ursodiol as elevated bilirubin likely related to liver disease (and possibly hematoma)  - would obtain last colonoscopy report (need up-to-date age appropriate screening if eligible for transplant)  - please check the following lab tests:  Quant Gold, Hemoglobin A1c, TSH, RPR, AMA, SRAVAN, ASMA, LKM Antibody, ceruloplasmin, SPEP, UPEP, urine alcohol metabolites, HBV surface Ab, HBV DNA quantitative, HCV RNA PCR (quantitative) Impression:  62yo M with Hypertension, DMT2, BPH, obesity, HFpEF grade 1, obesity, 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    #Hepatic encephalopathy, persistent.  No signs of infection, clot.  ?Home med compliance  #Presumed JEAN cirrhosis, MELD-Na 33 on 12/2      - varices: none on EGD outside in 10/2019      - HE: present, on lactulose and Xifaxan but not responding to therapy      - ascites: none currently, but history of ascites with SBP      - HCC: none on US 11/27, AFP 23 in 10/19      - Vascular assessment: poor US doppler exam for eval of vessels due to body habitus       - Liver Workup: HAV/HBV/HCV immunostudies neg, HIV neg, iron studies continue with AoCD      - Age appropriate screening: colonoscopy 5-6 months ago per patient      - Dental evaluation: >1 year ago, no loose teeth per patient      - Cardiac evaluation: poor study due to habitus but EF 65%, mild pHTN on TTE 10/25/19      - Blood type A+, Ab neg      - Previous transplant workup: none      - Social: retired, previous , lives in [place] with wife and son, previously highly functional (before October), Insurance -Medicare HMO  # MISA, Cr stable over last few days around 2.5, per neprhology was possibly prerenal on admission due to overdiuresis? Baseline in 10/2019 1.2)  # Chest wall hematoma  # T2DM  # Hypertension (currently normotensive)    Recs:  - would initiate midodrine 10mg TID and octreotide to increase renal perfusion  - continue lactulose 4x daily for now, continue Xifaxan BID for encephalopathy  - can complete thiamine trial for possible Wernicke's though unlikely given lack of response  - hold diuretics given patient is euvolemic, MISA  - ok to stop ursodiol as elevated bilirubin likely related to liver disease (and possibly hematoma)  - would obtain last colonoscopy report (need up-to-date age appropriate screening if eligible for transplant)  - please check the following lab tests:  Quant Gold, Hemoglobin A1c, TSH, RPR, AMA, SRAVAN, ASMA, LKM Antibody, ceruloplasmin, SPEP, UPEP, urine alcohol metabolites, HBV surface Ab, HBV DNA quantitative, HCV RNA PCR (quantitative) Impression:  62yo M with Hypertension, DMT2, BPH, obesity, HFpEF grade 1, obesity, 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    #Hepatic encephalopathy, persistent.  No signs of infection, clot.  ?Home med compliance  #Presumed JEAN cirrhosis, MELD-Na 33 on 12/2      - varices: none on EGD outside in 10/2019      - HE: present, on lactulose and Xifaxan but not responding to therapy      - ascites: none currently, but history of ascites with SBP      - HCC: none on US 11/27, AFP 23 in 10/19      - Vascular assessment: poor US doppler exam for eval of vessels due to body habitus       - Liver Workup: HAV/HBV/HCV immunostudies neg, HIV neg, iron studies continue with AoCD      - Age appropriate screening: colonoscopy 5-6 months ago per patient      - Dental evaluation: >1 year ago, no loose teeth per patient      - Cardiac evaluation: poor study due to habitus but EF 65%, mild pHTN on TTE 10/25/19      - Blood type A+, Ab neg      - Previous transplant workup: none      - Social: retired, previous , lives in [place] with wife and son, previously highly functional (before October), Insurance -Medicare HMO  # MISA, Cr stable over last few days around 2.5, per neprhology was possibly prerenal on admission due to overdiuresis? Baseline in 10/2019 1.2)  # Chest wall hematoma  # T2DM  # Hypertension (currently normotensive)    Recs:  - would initiate midodrine 10mg TID to increase renal perfusion  - bolus only of NS, would not give continuous due to concern for volume overload  - can give albumin 25% x 3 doses IV  - obtain repeat urine lytes and Pro/Cr ratio  - obtain AM cortisol given orthostasis  - continue lactulose 4x daily for now, continue Xifaxan BID for encephalopathy  - can complete thiamine trial for possible Wernicke's though unlikely given lack of response  - hold diuretics given patient is euvolemic, MISA  - would d/c bob as patient now awake, want to minimize risk of infection  - ok to stop ursodiol as elevated bilirubin likely related to liver disease (and possibly hematoma)  - would obtain last colonoscopy report (need up-to-date age appropriate screening if eligible for transplant)  - please check the following lab tests:  Quant Gold, Hemoglobin A1c, TSH, RPR, AMA, SRAVAN, ASMA, LKM Antibody, ceruloplasmin, SPEP, UPEP, urine alcohol metabolites, HBV surface Ab, HBV DNA quantitative, HCV RNA PCR (quantitative)  - will have rest of transplant team consult on him in the hospital, potential transplant candidate, will need formal evaluation

## 2019-12-02 NOTE — CONSULT NOTE ADULT - PROBLEM SELECTOR RECOMMENDATION 2
- recent EGD 5/2019 non-bleeding duodenal ulcers and No evidence of varices   - cirrhosis 2/2 NAFLD   - continue xifaxan and lactulose as above   - Protonix 40mg PO QD   - hepatology input appreciated
low salt diet
resolving  lactulose, rifaximin

## 2019-12-03 LAB
% ALBUMIN: 59.5 % — SIGNIFICANT CHANGE UP
% ALPHA 1: 1.8 % — SIGNIFICANT CHANGE UP
% ALPHA 2: 3.8 % — SIGNIFICANT CHANGE UP
% BETA: 13.9 % — SIGNIFICANT CHANGE UP
% GAMMA: 21 % — SIGNIFICANT CHANGE UP
% M SPIKE: 3.4 % — SIGNIFICANT CHANGE UP
ALBUMIN SERPL ELPH-MCNC: 3.9 G/DL — SIGNIFICANT CHANGE UP (ref 3.3–5)
ALBUMIN SERPL ELPH-MCNC: 3.9 G/DL — SIGNIFICANT CHANGE UP (ref 3.6–5.5)
ALBUMIN/GLOB SERPL ELPH: 1.4 RATIO — SIGNIFICANT CHANGE UP
ALP SERPL-CCNC: 131 U/L — HIGH (ref 40–120)
ALPHA1 GLOB SERPL ELPH-MCNC: 0.1 G/DL — SIGNIFICANT CHANGE UP (ref 0.1–0.4)
ALPHA2 GLOB SERPL ELPH-MCNC: 0.3 G/DL — LOW (ref 0.5–1)
ALT FLD-CCNC: 18 U/L — SIGNIFICANT CHANGE UP (ref 10–45)
ANION GAP SERPL CALC-SCNC: 13 MMOL/L — SIGNIFICANT CHANGE UP (ref 5–17)
ANION GAP SERPL CALC-SCNC: 14 MMOL/L — SIGNIFICANT CHANGE UP (ref 5–17)
AST SERPL-CCNC: 28 U/L — SIGNIFICANT CHANGE UP (ref 10–40)
B-GLOBULIN SERPL ELPH-MCNC: 0.9 G/DL — SIGNIFICANT CHANGE UP (ref 0.5–1)
BILIRUB SERPL-MCNC: 8 MG/DL — HIGH (ref 0.2–1.2)
BUN SERPL-MCNC: 44 MG/DL — HIGH (ref 7–23)
BUN SERPL-MCNC: 47 MG/DL — HIGH (ref 7–23)
CALCIUM SERPL-MCNC: 10.3 MG/DL — SIGNIFICANT CHANGE UP (ref 8.4–10.5)
CALCIUM SERPL-MCNC: 10.6 MG/DL — HIGH (ref 8.4–10.5)
CERULOPLASMIN SERPL-MCNC: 12 MG/DL — LOW (ref 15–30)
CHLORIDE SERPL-SCNC: 89 MMOL/L — LOW (ref 96–108)
CHLORIDE SERPL-SCNC: 93 MMOL/L — LOW (ref 96–108)
CO2 SERPL-SCNC: 26 MMOL/L — SIGNIFICANT CHANGE UP (ref 22–31)
CO2 SERPL-SCNC: 27 MMOL/L — SIGNIFICANT CHANGE UP (ref 22–31)
CORTIS AM PEAK SERPL-MCNC: 6.6 UG/DL — SIGNIFICANT CHANGE UP (ref 6–18.4)
CREAT SERPL-MCNC: 1.96 MG/DL — HIGH (ref 0.5–1.3)
CREAT SERPL-MCNC: 2.22 MG/DL — HIGH (ref 0.5–1.3)
GAMMA GLOBULIN: 1.4 G/DL — SIGNIFICANT CHANGE UP (ref 0.6–1.6)
GLUCOSE BLDC GLUCOMTR-MCNC: 100 MG/DL — HIGH (ref 70–99)
GLUCOSE BLDC GLUCOMTR-MCNC: 139 MG/DL — HIGH (ref 70–99)
GLUCOSE BLDC GLUCOMTR-MCNC: 160 MG/DL — HIGH (ref 70–99)
GLUCOSE BLDC GLUCOMTR-MCNC: 165 MG/DL — HIGH (ref 70–99)
GLUCOSE SERPL-MCNC: 171 MG/DL — HIGH (ref 70–99)
GLUCOSE SERPL-MCNC: 92 MG/DL — SIGNIFICANT CHANGE UP (ref 70–99)
HAV IGM SER-ACNC: SIGNIFICANT CHANGE UP
HBA1C BLD-MCNC: 5.7 % — HIGH (ref 4–5.6)
HBV CORE IGM SER-ACNC: SIGNIFICANT CHANGE UP
HBV SURFACE AG SER-ACNC: SIGNIFICANT CHANGE UP
HCT VFR BLD CALC: 27.1 % — LOW (ref 39–50)
HCV AB S/CO SERPL IA: 0.12 S/CO — SIGNIFICANT CHANGE UP (ref 0–0.99)
HCV AB SERPL-IMP: SIGNIFICANT CHANGE UP
HGB BLD-MCNC: 9.6 G/DL — LOW (ref 13–17)
INR BLD: 1.9 RATIO — HIGH (ref 0.88–1.16)
INTERPRETATION SERPL IFE-IMP: SIGNIFICANT CHANGE UP
M-SPIKE: 0.2 G/DL — HIGH (ref 0–0)
MCHC RBC-ENTMCNC: 34.5 PG — HIGH (ref 27–34)
MCHC RBC-ENTMCNC: 35.4 GM/DL — SIGNIFICANT CHANGE UP (ref 32–36)
MCV RBC AUTO: 97.5 FL — SIGNIFICANT CHANGE UP (ref 80–100)
PLATELET # BLD AUTO: 49 K/UL — LOW (ref 150–400)
POTASSIUM SERPL-MCNC: 3.4 MMOL/L — LOW (ref 3.5–5.3)
POTASSIUM SERPL-MCNC: 3.7 MMOL/L — SIGNIFICANT CHANGE UP (ref 3.5–5.3)
POTASSIUM SERPL-SCNC: 3.4 MMOL/L — LOW (ref 3.5–5.3)
POTASSIUM SERPL-SCNC: 3.7 MMOL/L — SIGNIFICANT CHANGE UP (ref 3.5–5.3)
PROT PATTERN SERPL ELPH-IMP: SIGNIFICANT CHANGE UP
PROT SERPL-MCNC: 6.6 G/DL — SIGNIFICANT CHANGE UP (ref 6–8.3)
PROT SERPL-MCNC: 6.6 G/DL — SIGNIFICANT CHANGE UP (ref 6–8.3)
PROT SERPL-MCNC: 6.8 G/DL — SIGNIFICANT CHANGE UP (ref 6–8.3)
PROTHROM AB SERPL-ACNC: 21.9 SEC — HIGH (ref 10–13.1)
RBC # BLD: 2.78 M/UL — LOW (ref 4.2–5.8)
RBC # FLD: 18.6 % — HIGH (ref 10.3–14.5)
SODIUM SERPL-SCNC: 129 MMOL/L — LOW (ref 135–145)
SODIUM SERPL-SCNC: 133 MMOL/L — LOW (ref 135–145)
T PALLIDUM AB TITR SER: NEGATIVE — SIGNIFICANT CHANGE UP
TSH SERPL-MCNC: 1.72 UIU/ML — SIGNIFICANT CHANGE UP (ref 0.27–4.2)
WBC # BLD: 6.73 K/UL — SIGNIFICANT CHANGE UP (ref 3.8–10.5)
WBC # FLD AUTO: 6.73 K/UL — SIGNIFICANT CHANGE UP (ref 3.8–10.5)

## 2019-12-03 PROCEDURE — 99232 SBSQ HOSP IP/OBS MODERATE 35: CPT

## 2019-12-03 RX ORDER — SODIUM CHLORIDE 9 MG/ML
2 INJECTION INTRAMUSCULAR; INTRAVENOUS; SUBCUTANEOUS THREE TIMES A DAY
Refills: 0 | Status: COMPLETED | OUTPATIENT
Start: 2019-12-03 | End: 2019-12-04

## 2019-12-03 RX ORDER — POTASSIUM CHLORIDE 20 MEQ
40 PACKET (EA) ORAL ONCE
Refills: 0 | Status: COMPLETED | OUTPATIENT
Start: 2019-12-03 | End: 2019-12-03

## 2019-12-03 RX ORDER — MIDODRINE HYDROCHLORIDE 2.5 MG/1
20 TABLET ORAL THREE TIMES A DAY
Refills: 0 | Status: DISCONTINUED | OUTPATIENT
Start: 2019-12-03 | End: 2019-12-17

## 2019-12-03 RX ADMIN — Medication 1: at 18:22

## 2019-12-03 RX ADMIN — LACTULOSE 30 GRAM(S): 10 SOLUTION ORAL at 18:23

## 2019-12-03 RX ADMIN — PANTOPRAZOLE SODIUM 40 MILLIGRAM(S): 20 TABLET, DELAYED RELEASE ORAL at 06:02

## 2019-12-03 RX ADMIN — Medication 1 GRAM(S): at 18:24

## 2019-12-03 RX ADMIN — INSULIN GLARGINE 20 UNIT(S): 100 INJECTION, SOLUTION SUBCUTANEOUS at 22:11

## 2019-12-03 RX ADMIN — LACTULOSE 30 GRAM(S): 10 SOLUTION ORAL at 10:49

## 2019-12-03 RX ADMIN — TAMSULOSIN HYDROCHLORIDE 0.4 MILLIGRAM(S): 0.4 CAPSULE ORAL at 22:26

## 2019-12-03 RX ADMIN — Medication 50 MILLILITER(S): at 05:27

## 2019-12-03 RX ADMIN — SODIUM CHLORIDE 2 GRAM(S): 9 INJECTION INTRAMUSCULAR; INTRAVENOUS; SUBCUTANEOUS at 00:10

## 2019-12-03 RX ADMIN — Medication 40 MILLIEQUIVALENT(S): at 18:23

## 2019-12-03 RX ADMIN — MIDODRINE HYDROCHLORIDE 20 MILLIGRAM(S): 2.5 TABLET ORAL at 18:27

## 2019-12-03 RX ADMIN — ATORVASTATIN CALCIUM 10 MILLIGRAM(S): 80 TABLET, FILM COATED ORAL at 22:11

## 2019-12-03 RX ADMIN — LACTULOSE 30 GRAM(S): 10 SOLUTION ORAL at 22:11

## 2019-12-03 RX ADMIN — MIDODRINE HYDROCHLORIDE 10 MILLIGRAM(S): 2.5 TABLET ORAL at 12:47

## 2019-12-03 RX ADMIN — LACTULOSE 30 GRAM(S): 10 SOLUTION ORAL at 15:26

## 2019-12-03 RX ADMIN — SODIUM CHLORIDE 2 GRAM(S): 9 INJECTION INTRAMUSCULAR; INTRAVENOUS; SUBCUTANEOUS at 15:27

## 2019-12-03 RX ADMIN — Medication 1 GRAM(S): at 05:27

## 2019-12-03 RX ADMIN — MIDODRINE HYDROCHLORIDE 10 MILLIGRAM(S): 2.5 TABLET ORAL at 05:27

## 2019-12-03 RX ADMIN — LACTULOSE 30 GRAM(S): 10 SOLUTION ORAL at 05:27

## 2019-12-03 RX ADMIN — SODIUM CHLORIDE 2 GRAM(S): 9 INJECTION INTRAMUSCULAR; INTRAVENOUS; SUBCUTANEOUS at 05:27

## 2019-12-03 RX ADMIN — SODIUM CHLORIDE 2 GRAM(S): 9 INJECTION INTRAMUSCULAR; INTRAVENOUS; SUBCUTANEOUS at 22:11

## 2019-12-03 NOTE — PROGRESS NOTE ADULT - SUBJECTIVE AND OBJECTIVE BOX
Prashant Segundo is being evaluated for a liver transplant. PMH non-alcoholic cirrhosis, HTN, DM    The patient has an allergy to codeine - anaphylaxis    Medications:  Rifaximin 550 mg 1BID  Protonix 40 mg daily  Lactulose 30g q6h  Lantus 40 units at bedtime  lasix 80 mg 1BID  flomax 0.4 mg daily  pravastatin 40 mg daily  spironolactone 25 mg 1BID  ursodiol 300 mg daily    The patient has no pharmacologic contraindications to transplant and is cleared.

## 2019-12-03 NOTE — PROGRESS NOTE ADULT - ASSESSMENT
Impression:  64yo M with Hypertension, DMT2, BPH, obesity, HFpEF grade 1, obesity, presumed EJAN cirrhosis, who presented from rehab on 11/26 due to confusion.    #Hepatic encephalopathy, persistent.  No signs of infection, clot.  ?Home med compliance  #Presumed JEAN cirrhosis, MELD-Na 32 on 12/3      - varices: none on EGD outside in 10/2019      - HE: present, on lactulose and Xifaxan but not responding to therapy      - ascites: none currently, but history of ascites with SBP      - HCC: none on US 11/27, AFP 23 in 10/19      - Vascular assessment: poor US doppler exam for eval of vessels due to body habitus       - Liver Workup: HAV/HBV/HCV immunostudies neg, HIV neg, iron studies continue with AoCD      - Age appropriate screening: colonoscopy 5-6 months ago per patient      - Dental evaluation: >1 year ago, no loose teeth per patient      - Cardiac evaluation: poor study due to habitus but EF 65%, mild pHTN on TTE 10/25/19      - Blood type A+, Ab neg      - Previous transplant workup: none      - Social: retired, previous , lives in [place] with wife and son, previously highly functional (before October), Insurance -Medicare HMO  # MISA, Cr improved today, Concepcion <35 on labs continue with pre-renal  # Chest wall hematoma  # T2DM  # Hypertension (currently normotensive)    Recs:  - followup liver labs and AM cortisol   - continue midodrine 10mg TID  - continue albumin 25% x 3 doses IV total for now, can re-dose if Cr not improving; hold diuretics  - obtain MRI abdomen with contrast to evaluate the liver better given poor image quality on US and noncontrasted CT  - will need dobutamine stress test +/- RHC   - continue lactulose 3-4 times daily, continue Xifaxan BID for encephalopathy  - can complete thiamine trial for possible Wernicke's though unlikely given lack of response  - would obtain last colonoscopy report (need up-to-date age appropriate screening if eligible for transplant)  - formal transplant evaluation to begin today, patient consented

## 2019-12-03 NOTE — PROGRESS NOTE ADULT - SUBJECTIVE AND OBJECTIVE BOX
INTERVAL HPI/OVERNIGHT EVENTS:    mild nausea this morning no vomiting   reports no bm yet today when seen     MEDICATIONS  (STANDING):  atorvastatin 10 milliGRAM(s) Oral at bedtime  dextrose 5%. 1000 milliLiter(s) (50 mL/Hr) IV Continuous <Continuous>  dextrose 50% Injectable 12.5 Gram(s) IV Push once  dextrose 50% Injectable 25 Gram(s) IV Push once  dextrose 50% Injectable 25 Gram(s) IV Push once  insulin glargine Injectable (LANTUS) 20 Unit(s) SubCutaneous at bedtime  insulin lispro (HumaLOG) corrective regimen sliding scale   SubCutaneous three times a day before meals  insulin lispro (HumaLOG) corrective regimen sliding scale   SubCutaneous at bedtime  lactulose Syrup 30 Gram(s) Oral every 4 hours  midodrine. 10 milliGRAM(s) Oral three times a day  pantoprazole    Tablet 40 milliGRAM(s) Oral before breakfast  rifAXIMin 550 milliGRAM(s) Oral two times a day  sodium chloride 2 Gram(s) Oral three times a day  sucralfate 1 Gram(s) Oral two times a day  tamsulosin 0.4 milliGRAM(s) Oral at bedtime    MEDICATIONS  (PRN):  dextrose 40% Gel 15 Gram(s) Oral once PRN Blood Glucose LESS THAN 70 milliGRAM(s)/deciliter  glucagon  Injectable 1 milliGRAM(s) IntraMuscular once PRN Glucose LESS THAN 70 milligrams/deciliter      Allergies    codeine (Anaphylaxis)    Intolerances    NO  RED MEAT (Unknown)      Review of Systems:    General:  No wt loss, fevers, chills, night sweats, fatigue   Eyes:  Good vision, no reported pain  ENT:  No sore throat, pain, runny nose, dysphagia  CV:  No pain, palpitations, hypo/hypertension  Resp:  No dyspnea, cough, tachypnea, wheezing  GI:  No pain, +nausea, No vomiting, No diarrhea, No constipation, No weight loss, No fever, No pruritis, No rectal bleeding, No melena, No dysphagia  :  No pain, bleeding, incontinence, nocturia  Muscle:  No pain, weakness  Neuro:  No weakness, tingling, memory problems  Psych:  No fatigue, insomnia, mood problems, depression  Endocrine:  No polyuria, polydypsia, cold/heat intolerance  Heme:  No petechiae, ecchymosis, easy bruisability  Skin:  No rash, tattoos, scars, edema      Vital Signs Last 24 Hrs  T(C): 37.1 (03 Dec 2019 04:10), Max: 37.1 (02 Dec 2019 21:24)  T(F): 98.7 (03 Dec 2019 04:10), Max: 98.7 (02 Dec 2019 21:24)  HR: 83 (03 Dec 2019 04:10) (80 - 83)  BP: 119/62 (03 Dec 2019 04:10) (117/63 - 119/62)  BP(mean): --  RR: 18 (03 Dec 2019 04:10) (18 - 18)  SpO2: 96% (03 Dec 2019 04:10) (96% - 97%)    PHYSICAL EXAM:    Constitutional: NAD  HEENT: EOMI, throat clear  Neck: No LAD, supple  Respiratory: CTA and P  Cardiovascular: S1 and S2, RRR, no M  Gastrointestinal: BS+, soft, NT/ND, neg HSM,  Extremities: No peripheral edema, neg clubbing, cyanosis  Vascular: 2+ peripheral pulses  Neurological: A/O x 3, no focal deficits  Psychiatric: Normal mood, normal affect  Skin: No rashes      LABS:                        9.6    6.73  )-----------( 49       ( 03 Dec 2019 08:56 )             27.1     12-03    129<L>  |  89<L>  |  47<H>  ----------------------------<  92  3.4<L>   |  26  |  2.22<H>    Ca    10.6<H>      03 Dec 2019 06:16    TPro  6.8  /  Alb  3.9  /  TBili  8.0<H>  /  DBili  x   /  AST  28  /  ALT  18  /  AlkPhos  131<H>  12-03    PT/INR - ( 03 Dec 2019 08:53 )   PT: 21.9 sec;   INR: 1.90 ratio               RADIOLOGY & ADDITIONAL TESTS:

## 2019-12-03 NOTE — PROGRESS NOTE ADULT - SUBJECTIVE AND OBJECTIVE BOX
Patient is a 63y Male whom presented to the hospital with ckd and dena   seen in am event is noted   PAST MEDICAL & SURGICAL HISTORY:  GIB (gastrointestinal bleeding)  GERD with esophagitis: Gastritis &amp; Non Bleeding Ulcers  Hepatic encephalopathy  Obesity  Fatty liver disease, nonalcoholic  Renal stones: 25 years ago  Hypertension  Neuropathy  Hypercholesteremia  Diabetes  S/P cholecystectomy      MEDICATIONS  (STANDING):  dextrose 5%. 1000 milliLiter(s) (50 mL/Hr) IV Continuous <Continuous>  dextrose 50% Injectable 12.5 Gram(s) IV Push once  dextrose 50% Injectable 25 Gram(s) IV Push once  dextrose 50% Injectable 25 Gram(s) IV Push once  insulin lispro (HumaLOG) corrective regimen sliding scale   SubCutaneous three times a day before meals  insulin lispro (HumaLOG) corrective regimen sliding scale   SubCutaneous at bedtime      Allergies    codeine (Anaphylaxis)    Intolerances    NO  RED MEAT (Unknown)                            9.6    6.73  )-----------( 49       ( 03 Dec 2019 08:56 )             27.1       CBC Full  -  ( 03 Dec 2019 08:56 )  WBC Count : 6.73 K/uL  RBC Count : 2.78 M/uL  Hemoglobin : 9.6 g/dL  Hematocrit : 27.1 %  Platelet Count - Automated : 49 K/uL  Mean Cell Volume : 97.5 fl  Mean Cell Hemoglobin : 34.5 pg  Mean Cell Hemoglobin Concentration : 35.4 gm/dL  Auto Neutrophil # : x  Auto Lymphocyte # : x  Auto Monocyte # : x  Auto Eosinophil # : x  Auto Basophil # : x  Auto Neutrophil % : x  Auto Lymphocyte % : x  Auto Monocyte % : x  Auto Eosinophil % : x  Auto Basophil % : x      12-03    129<L>  |  89<L>  |  47<H>  ----------------------------<  92  3.4<L>   |  26  |  2.22<H>    Ca    10.6<H>      03 Dec 2019 06:16    TPro  6.8  /  Alb  3.9  /  TBili  8.0<H>  /  DBili  x   /  AST  28  /  ALT  18  /  AlkPhos  131<H>  12-03      CAPILLARY BLOOD GLUCOSE      POCT Blood Glucose.: 139 mg/dL (03 Dec 2019 12:17)  POCT Blood Glucose.: 100 mg/dL (03 Dec 2019 08:42)  POCT Blood Glucose.: 168 mg/dL (02 Dec 2019 22:07)      Vital Signs Last 24 Hrs  T(C): 37.2 (03 Dec 2019 13:07), Max: 37.2 (03 Dec 2019 13:07)  T(F): 99 (03 Dec 2019 13:07), Max: 99 (03 Dec 2019 13:07)  HR: 83 (03 Dec 2019 04:10) (80 - 83)  BP: 119/62 (03 Dec 2019 04:10) (117/63 - 119/62)  BP(mean): --  RR: 18 (03 Dec 2019 13:07) (18 - 18)  SpO2: 94% (03 Dec 2019 13:07) (94% - 97%)        PT/INR - ( 03 Dec 2019 08:53 )   PT: 21.9 sec;   INR: 1.90 ratio         SOCIAL HISTORY:  Denies ETOh,Smoking,     FAMILY HISTORY:  Family history of type 2 diabetes mellitus  Family history of hypertension  Family history of stomach cancer      REVIEW OF SYSTEMS:    CONSTITUTIONAL: No weakness, fevers or chills  RESPIRATORY: No cough, wheezing, hemoptysis; No shortness of breath  CARDIOVASCULAR: No chest pain or palpitations  GASTROINTESTINAL: No abdominal or epigastric pain. No nausea, vomiting,     No diarrhea or constipation. No melena   GENITOURINARY: No dysuria, frequency or hematuria  NEUROLOGICAL: No numbness or weakness  SKIN: dry                           PHYSICAL EXAM:    Constitutional: NAD  HEENT: conjunctive   clear   Neck:  No JVD  Respiratory: CTAB  Cardiovascular: S1 and S2  Gastrointestinal: BS+, soft, NT/ND  Extremities: No peripheral edema  Neurological:  no focal deficits  Psychiatric: Normal mood, normal affect  Skin: dry   Access: Not applicable

## 2019-12-03 NOTE — DIETITIAN INITIAL EVALUATION ADULT. - OTHER INFO
INFORMATION PTA  Diet PTA: Pt interview pending. Per chart, pt requests no red meat  Nutrition status PTA: Per chart, pt manages DM2 with 40 units insulin glargine   Nutrition Supplements PTA: potassium chloride, vit D3  Food Allergies: NKFA  Weight History PTA (pounds): 342 (1/24/18), 299 (7/11/18), 308 (10/20/18), 264 (4/5/19), 356 (10/9/19), 362 (10/23/19); ? accuracy of weight history, current dosing wt 255 pounds. Per Txp Team note: "Noted 100lb weight loss over the last 2months likely from fluid overload."  Other Information: MELD-Na 33 (12/2)    INFORMATION THIS ADMISSION  Last BM: 12/1, 12/2 S/P lactulose enema  Other  Information: MISA on CKD 3  Therapeutic Diet Education Provided:  Education Handouts Provided: INFORMATION PTA  Diet PTA: Pt reports he typically eats 3 homemade meals daily, with ample protein intake. Pt drinks diet soda, avoids sugary beverages and sweets. Per chart, pt requests no red meat as recommended by his MD.   Nutrition status PTA: Per chart, pt manages DM2 with 40 units insulin glargine; reports typical fingersticks of 110-120mlg/dL. HbA1c 4.9% may be falsely low due to cirrhosis.   Nutrition Supplements PTA: potassium chloride, vit D3  Food Allergies: NKFA  Weight History PTA (pounds): 342 (1/24/18), 299 (7/11/18), 308 (10/20/18), 264 (4/5/19), 356 (10/9/19), 362 (10/23/19); ? accuracy of weight history, current dosing wt 255 pounds. Per Txp Team note: "Noted 100lb weight loss over the last 2months likely from fluid overload."  Other Information: MELD-Na 33 (12/2)    INFORMATION THIS ADMISSION  Last BM: 12/1, 12/2 S/P lactulose enema  Other  Information: MISA on CKD 3  Therapeutic Diet Education Provided: Reviewed Consistent Carbohydrate diet including carbohydrate content of foods and portion sizes. Reviewed CKD diet, including moderate intake of sodium, potassium and phosphorus. Pt is advised that his diet will change slightly in the event of liver transplant; briefly reviewed Food Safety guidelines. Written diet education offered and accepted.   Education Handouts Provided: INFORMATION PTA  Diet PTA: Pt reports he typically eats 3 homemade meals daily, with ample protein intake. Pt drinks diet soda, avoids sugary beverages and sweets. Per chart, pt requests no red meat as recommended by his MD.   Nutrition status PTA: Per chart, pt manages DM2 with 40 units insulin glargine; reports typical fingersticks of 110-120mlg/dL. HbA1c 4.9% may be falsely low due to cirrhosis.   Nutrition Supplements PTA: potassium chloride, vit D3  Food Allergies: NKFA  Weight History PTA (pounds): 342 (1/24/18), 299 (7/11/18), 308 (10/20/18), 264 (4/5/19), 356 (10/9/19), 362 (10/23/19); ? accuracy of weight history, current dosing wt 255 pounds. Per Txp Team note: "Noted 100lb weight loss over the last 2months likely from fluid overload."  Other Information: MELD-Na 33 (12/2)    INFORMATION THIS ADMISSION  Last BM: 12/1, 12/2 S/P lactulose enema  Other  Information: MISA on CKD 3  Therapeutic Diet Education Provided: Reviewed Consistent Carbohydrate diet including carbohydrate content of foods and portion sizes. Reviewed CKD diet, including moderate intake of sodium, potassium and phosphorus. Advised pt on diet modification recommendations after liver transplant; briefly reviewed Food Safety guidelines. Written diet education offered and accepted.   Education Handouts Provided: Type 2 Diabetes Nutrition Therapy handout; Organ Transplant Nutrition Therapy handout

## 2019-12-03 NOTE — DIETITIAN INITIAL EVALUATION ADULT. - PERTINENT MEDS FT
MEDICATIONS  (STANDING):  atorvastatin 10 milliGRAM(s) Oral at bedtime  dextrose 5%. 1000 milliLiter(s) (50 mL/Hr) IV Continuous <Continuous>  dextrose 50% Injectable 12.5 Gram(s) IV Push once  dextrose 50% Injectable 25 Gram(s) IV Push once  dextrose 50% Injectable 25 Gram(s) IV Push once  insulin glargine Injectable (LANTUS) 20 Unit(s) SubCutaneous at bedtime  insulin lispro (HumaLOG) corrective regimen sliding scale   SubCutaneous three times a day before meals  insulin lispro (HumaLOG) corrective regimen sliding scale   SubCutaneous at bedtime  lactulose Syrup 30 Gram(s) Oral every 4 hours  midodrine. 10 milliGRAM(s) Oral three times a day  pantoprazole    Tablet 40 milliGRAM(s) Oral before breakfast  rifAXIMin 550 milliGRAM(s) Oral two times a day  sucralfate 1 Gram(s) Oral two times a day  tamsulosin 0.4 milliGRAM(s) Oral at bedtime

## 2019-12-03 NOTE — PROGRESS NOTE ADULT - SUBJECTIVE AND OBJECTIVE BOX
SUBJECTIVE / OVERNIGHT EVENTS: pt denies headache, nausea,v   chest pain, shortness of breath more confused than yesterday   + bms    MEDICATIONS  (STANDING):  atorvastatin 10 milliGRAM(s) Oral at bedtime  dextrose 5%. 1000 milliLiter(s) (50 mL/Hr) IV Continuous <Continuous>  dextrose 50% Injectable 12.5 Gram(s) IV Push once  dextrose 50% Injectable 25 Gram(s) IV Push once  dextrose 50% Injectable 25 Gram(s) IV Push once  insulin glargine Injectable (LANTUS) 20 Unit(s) SubCutaneous at bedtime  insulin lispro (HumaLOG) corrective regimen sliding scale   SubCutaneous three times a day before meals  insulin lispro (HumaLOG) corrective regimen sliding scale   SubCutaneous at bedtime  lactulose Syrup 30 Gram(s) Oral every 4 hours  midodrine. 20 milliGRAM(s) Oral three times a day  pantoprazole    Tablet 40 milliGRAM(s) Oral before breakfast  rifAXIMin 550 milliGRAM(s) Oral two times a day  sodium chloride 2 Gram(s) Oral three times a day  sucralfate 1 Gram(s) Oral two times a day  tamsulosin 0.4 milliGRAM(s) Oral at bedtime    MEDICATIONS  (PRN):  dextrose 40% Gel 15 Gram(s) Oral once PRN Blood Glucose LESS THAN 70 milliGRAM(s)/deciliter  glucagon  Injectable 1 milliGRAM(s) IntraMuscular once PRN Glucose LESS THAN 70 milligrams/deciliter    Vital Signs Last 24 Hrs  T(C): 36.4 (03 Dec 2019 21:09), Max: 37.2 (03 Dec 2019 13:07)  T(F): 97.6 (03 Dec 2019 21:09), Max: 99 (03 Dec 2019 13:07)  HR: 78 (03 Dec 2019 21:09) (78 - 83)  BP: 120/66 (03 Dec 2019 21:09) (119/62 - 120/66)  BP(mean): --  RR: 17 (03 Dec 2019 21:09) (17 - 18)  SpO2: 96% (03 Dec 2019 21:09) (94% - 96%)    Constitutional: No fever, fatigue  Skin: No rash.  Eyes: No recent vision problems or eye pain.  ENT: No congestion, ear pain, or sore throat.  Cardiovascular: No chest pain or palpation.  Respiratory: No cough, shortness of breath, congestion, or wheezing.  Gastrointestinal: No abdominal pain, nausea, vomiting, or diarrhea.  Genitourinary: No dysuria.  Musculoskeletal: No joint swelling.  Neurologic: No headache.    PHYSICAL EXAM:  GENERAL: NAD  EYES: EOMI, PERRLA  NECK: Supple, No JVD  CHEST/LUNG: dec breath sounds at bases   HEART:  S1 , S2 +  ABDOMEN: soft , bs+, mild distension + , no tenderness  EXTREMITIES:  no edema  NEUROLOGY:alert awake oriented   flap tremor+    LABS:      133<L>  |  93<L>  |  44<H>  ----------------------------<  171<H>  3.7   |  27  |  1.96<H>    Ca    10.3      03 Dec 2019 20:10    TPro  6.8  /  Alb  3.9  /  TBili  8.0<H>  /  DBili      /  AST  28  /  ALT  18  /  AlkPhos  131<H>      Creatinine Trend: 1.96 <--, 2.22 <--, 2.49 <--, 2.47 <--, 2.50 <--, 2.29 <--, 2.31 <--, 2.55 <--, 2.59 <--, 2.70 <--, 2.68 <--, 2.63 <--, 2.67 <--                        9.6    6.73  )-----------( 49       ( 03 Dec 2019 08:56 )             27.1     Urine Studies:  Urinalysis Basic - ( 2019 08:42 )    Color: Yellow / Appearance: Slightly Turbid / S.013 / pH:   Gluc:  / Ketone: Negative  / Bili: Negative / Urobili: <2 mg/dL   Blood:  / Protein: Trace / Nitrite: Negative   Leuk Esterase: Negative / RBC: 100 /HPF / WBC 4 /HPF   Sq Epi:  / Non Sq Epi: 0 /HPF / Bacteria: Negative      Creatinine, Random Urine: 89 mg/dL ( @ 20:18)  Protein/Creatinine Ratio Calculation: 0.6 Ratio ( @ 20:18)  Sodium, Random Urine: <35 mmol/L ( @ 15:18)  Creatinine, Random Urine: 99 mg/dL ( @ 15:18)  Osmolality, Random Urine: 335 mosm/Kg ( @ 00:20)          LIVER FUNCTIONS - ( 03 Dec 2019 06:16 )  Alb: 3.9 g/dL / Pro: 6.8 g/dL / ALK PHOS: 131 U/L / ALT: 18 U/L / AST: 28 U/L / GGT: x           PT/INR - ( 03 Dec 2019 08:53 )   PT: 21.9 sec;   INR: 1.90 ratio           Urine Studies:  Urinalysis Basic - ( 2019 08:42 )    Color: Yellow / Appearance: Slightly Turbid / S.013 / pH:   Gluc:  / Ketone: Negative  / Bili: Negative / Urobili: <2 mg/dL   Blood:  / Protein: Trace / Nitrite: Negative   Leuk Esterase: Negative / RBC: 100 /HPF / WBC 4 /HPF   Sq Epi:  / Non Sq Epi: 0 /HPF / Bacteria: Negative      Sodium, Random Urine: <35 mmol/L ( @ 15:18)  Creatinine, Random Urine: 99 mg/dL ( @ 15:18)  Osmolality, Random Urine: 335 mosm/Kg ( @ 00:20)  Creatinine, Random Urine: 67 mg/dL ( @ 23:21)  Creatinine, Random Urine: 68 mg/dL ( @ 21:19)  Protein/Creatinine Ratio Calculation: 0.2 Ratio ( @ 21:19)  Potassium, Random Urine: 37 mmol/L ( @ 21:19)  Sodium, Random Urine: 61 mmol/L ( @ 21:19)          LIVER FUNCTIONS - ( 02 Dec 2019 06:00 )  Alb: 3.8 g/dL / Pro: 6.6 g/dL / ALK PHOS: 131 U/L / ALT: 21 U/L / AST: 34 U/L / GGT: x           PT/INR - ( 02 Dec 2019 07:49 )   PT: 22.5 sec;   INR: 1.93 ratio             Osmolality, Random Urine: 335 mosm/Kg ( @ 00:20)  Creatinine, Random Urine: 67 mg/dL ( @ 23:21)  Creatinine, Random Urine: 68 mg/dL ( @ 21:19)  Protein/Creatinine Ratio Calculation: 0.2 Ratio (11-26 @ 21:19)  Potassium, Random Urine: 37 mmol/L ( 21:19)  Sodium, Random Urine: 61 mmol/L ( @ 21:19)          LIVER FUNCTIONS - ( 01 Dec 2019 06:13 )  Alb: 4.0 g/dL / Pro: 6.9 g/dL / ALK PHOS: 123 U/L / ALT: 21 U/L / AST: 35 U/L / GGT: x           PT/INR - ( 01 Dec 2019 06:13 )   PT: 22.5 sec;   INR: 1.92 ratio         PTT - ( 2019 07:09 )  PTT:41.6 sec  Osmolality, Random Urine: 335 mosm/Kg ( @ 00:20)  Creatinine, Random Urine: 67 mg/dL ( 23:21)  Creatinine, Random Urine: 68 mg/dL ( 21:19)  Protein/Creatinine Ratio Calculation: 0.2 Ratio ( 21:19)  Potassium, Random Urine: 37 mmol/L ( 21:19)  Sodium, Random Urine: 61 mmol/L ( 21:19)          LIVER FUNCTIONS - ( 2019 07:09 )  Alb: 4.0 g/dL / Pro: 6.7 g/dL / ALK PHOS: 121 U/L / ALT: 21 U/L / AST: 31 U/L / GGT: x           PT/INR - ( 2019 07:09 )   PT: 23.5 sec;   INR: 2.02 ratio         PTT - ( 2019 07:09 )  PTT:41.6 sec       PTT - ( 2019 08:22 )  PTT:37.6 sec  Color: Yellow / Appearance: Slightly Turbid / S.013 / pH:   Gluc:  / Ketone: Negative  / Bili: Negative / Urobili: <2 mg/dL   Blood:  / Protein: Trace / Nitrite: Negative   Leuk Esterase: Negative / RBC: 100 /HPF / WBC 4 /HPF   Sq Epi:  / Non Sq Epi: 0 /HPF / Bacteria: Negative      Osmolality, Random Urine: 335 mosm/Kg ( @ 00:20)  Creatinine, Random Urine: 67 mg/dL ( @ 23:21)  Creatinine, Random Urine: 68 mg/dL ( 21:19)  Protein/Creatinine Ratio Calculation: 0.2 Ratio ( @ 21:19)  Potassium, Random Urine: 37 mmol/L ( @ 21:19)  Sodium, Random Urine: 61 mmol/L ( @ 21:19)          LIVER FUNCTIONS - ( 2019 06:29 )  Alb: 3.0 g/dL / Pro: 6.9 g/dL / ALK PHOS: 182 U/L / ALT: 23 U/L / AST: 36 U/L / GGT: x           PT/INR - ( 2019 08:38 )   PT: 21.8 sec;   INR: 1.88 ratio         PTT - ( 2019 08:38 )  PTT:36.8 sec  Blood: x / Protein: Negative / Nitrite: Negative   Leuk Esterase: Negative / RBC: 14 /HPF / WBC 4 /HPF   Sq Epi: x / Non Sq Epi: 0 /HPF / Bacteria: Negative        RADIOLOGY & ADDITIONAL TESTS:    Imaging Personally Reviewed:    Consultant(s) Notes Reviewed:      Care Discussed with Consultants/Other Providers:

## 2019-12-03 NOTE — PROGRESS NOTE ADULT - ATTENDING COMMENTS
Hepatology Staff: Yomi Medina MD  I saw and examined the patient along with Dr. Del Real on 12/3/2019.     Patient overall doing better, mental status at baseline, MISA- improving. Keep diuretics on hold. After review of his medical records, and thorough review of his medical condition, in my opinion patient will benefit with evaluation for Liver Transplantation. We will proceed with a formal transplant evaluation.    I reviewed the following areas and provided time for questions to be asked and for information to be clarified and/or repeated.  Evaluation process: I reviewed the results of tests and consults available to date and results of physical evaluation. I discussed the tests/consults that are required to complete an evaluation. I discussed the purpose of specific tests/consults, why they are needed, what the test involves, and discussed choices of where tests can be done. I discussed inclusion and exclusion criteria for organ transplant candidacy at [facility] and provided a copy of selection criteria, if requested. I advised her if alternative treatment options are available to them. I discussed the importance of abstinence from illicit, recreational, and prescriptive drug use. I discussed the importance of abstinence from alcohol use. I discussed the necessity of following a strict medical regimen post-transplant. I discussed the importance of an adequate support system to assist patient through evaluation, listing, and transplant process. The candidate was given the opportunity to ask questions.    Candidate selection: I discussed the Recipient Review Committee (RRC) meeting process and consensus decision making of the team. I discussed that the team will decide if an organ transplant is indicated and if the patient is a suitable candidate medically, surgically, psychosocially, and financially. If alternative treatments are identified by the team, this will be discussed with the patient. The candidate was given the opportunity to ask questions.  Waiting list: I discussed liver allocation and MELD/PELD system, prioritization on the waiting list, and average waiting time for patients based on their disease etiology. I discussed the need for periodic MELD/PELD recertification per OPTN/UNOS regulations and the consequence of not completing the requested tests/procedures on time. I discussed multiple listing options and the option to transfer her waiting time to another center. I discussed the possible progression and complications of their disease, and the possibility that she may not be a candidate in the future for organ transplant. I discussed with the patient that if she uses alcohol or any recreational drugs while on the waiting list or if she is non-adherent with follow-up or medications, she will be removed from the transplant waiting list. I advised the patient of her right to withdraw consent for transplant at any time. The candidate was given the opportunity to ask questions.    Complications of liver disease: I discussed signs and symptoms of progressive liver disease or complications requiring medical attention. I discussed the complication of ascites/edema and generalized fluid overload. I discussed the complication of infections and spontaneous bacterial peritonitis. I discussed the development/worsening of renal insufficiency. I discussed generalized malaise, fatigue, and muscle wasting. I discussed encephalopathy, including the prevention, treatment, and worsening of the condition. I discussed GI bleeding, which is a medical emergency if present, and the need for urgent/emergent medical care. I discussed the possibility of development or worsening of HCC while on the waiting list. I discussed the possibility of progression of HCC beyond criteria for liver transplant. I reviewed the procedure for HCC cases when a donor liver becomes available, namely that the patient will be taken to the OR and explored prior to transplant. If any tumor is found outside of the liver, the transplant procedure will be cancelled and the abdomen will be closed. The candidate was given the opportunity to ask questions.

## 2019-12-03 NOTE — PROGRESS NOTE ADULT - PROBLEM SELECTOR PLAN 1
- improving  - likely hepatorenal syndrome; renal input appreciated  - titrate lactulose for 2-3BM/day; please give Lactulose Enemas if pt unable to tolerate PO intake   - continue xifaxan bid   - diet as tolerated with aspiration precautions

## 2019-12-03 NOTE — PROGRESS NOTE ADULT - SUBJECTIVE AND OBJECTIVE BOX
Chief Complaint:  Patient is a 63y old  Male who presents with a chief complaint of confusion (02 Dec 2019 18:26)      Interval Events: Patient feels ok today.  He does feel a little bit overwhelmed processing the idea of liver transplant.    Allergies:  codeine (Anaphylaxis)  NO  RED MEAT (Unknown)      Hospital Medications:  atorvastatin 10 milliGRAM(s) Oral at bedtime  dextrose 40% Gel 15 Gram(s) Oral once PRN  dextrose 5%. 1000 milliLiter(s) IV Continuous <Continuous>  dextrose 50% Injectable 12.5 Gram(s) IV Push once  dextrose 50% Injectable 25 Gram(s) IV Push once  dextrose 50% Injectable 25 Gram(s) IV Push once  glucagon  Injectable 1 milliGRAM(s) IntraMuscular once PRN  insulin glargine Injectable (LANTUS) 20 Unit(s) SubCutaneous at bedtime  insulin lispro (HumaLOG) corrective regimen sliding scale   SubCutaneous three times a day before meals  insulin lispro (HumaLOG) corrective regimen sliding scale   SubCutaneous at bedtime  lactulose Syrup 30 Gram(s) Oral every 4 hours  midodrine. 10 milliGRAM(s) Oral three times a day  pantoprazole    Tablet 40 milliGRAM(s) Oral before breakfast  rifAXIMin 550 milliGRAM(s) Oral two times a day  sucralfate 1 Gram(s) Oral two times a day  tamsulosin 0.4 milliGRAM(s) Oral at bedtime      PMHX/PSHX:  GIB (gastrointestinal bleeding)  GERD with esophagitis  Hepatic encephalopathy  Obesity  Fatty liver disease, nonalcoholic  Renal stones  Hypertension  Neuropathy  Hypercholesteremia  Diabetes  S/P cholecystectomy  No significant past surgical history      Family history:  Family history of type 2 diabetes mellitus  Family history of hypertension  Family history of stomach cancer  No pertinent family history in first degree relatives      ROS:     General:  No wt loss, fevers, chills, night sweats, fatigue,   Eyes:  Good vision, no reported pain  ENT:  No sore throat, pain, runny nose, dysphagia  CV:  No pain, palpitations, hypo/hypertension  Resp:  No dyspnea, cough, tachypnea, wheezing  GI:  See HPI  :  No pain, bleeding, incontinence, nocturia  Muscle:  No pain, weakness  Neuro:  No weakness, tingling, memory problems  Psych:  No fatigue, insomnia, mood problems, depression  Endocrine:  No polyuria, polydipsia, cold/heat intolerance  Heme:  No petechiae, ecchymosis, easy bruisability  Skin:  No rash, edema      PHYSICAL EXAM:     GENERAL: NAD  HEENT:  NC/AT  CHEST:  Full & symmetric excursion, no increased effort  ABDOMEN:  Soft, non-tender, non-distended, +BS  EXTREMITIES:  no edema  SKIN:  jaundice  NEURO:  Alert      Vital Signs:  Vital Signs Last 24 Hrs  T(C): 37.1 (03 Dec 2019 04:10), Max: 37.1 (02 Dec 2019 21:24)  T(F): 98.7 (03 Dec 2019 04:10), Max: 98.7 (02 Dec 2019 21:24)  HR: 83 (03 Dec 2019 04:10) (75 - 83)  BP: 119/62 (03 Dec 2019 04:10) (105/61 - 119/62)  BP(mean): --  RR: 18 (03 Dec 2019 04:10) (18 - 18)  SpO2: 96% (03 Dec 2019 04:10) (96% - 97%)  Daily     Daily Weight in k.6 (03 Dec 2019 09:22)    LABS:                        9.6    6.73  )-----------( x        ( 03 Dec 2019 08:56 )             27.1     12-03    129<L>  |  89<L>  |  47<H>  ----------------------------<  92  3.4<L>   |  26  |  2.22<H>    Ca    10.6<H>      03 Dec 2019 06:16    TPro  6.8  /  Alb  3.9  /  TBili  8.0<H>  /  DBili  x   /  AST  28  /  ALT  18  /  AlkPhos  131<H>  12-03    LIVER FUNCTIONS - ( 03 Dec 2019 06:16 )  Alb: 3.9 g/dL / Pro: 6.8 g/dL / ALK PHOS: 131 U/L / ALT: 18 U/L / AST: 28 U/L / GGT: x           PT/INR - ( 03 Dec 2019 08:53 )   PT: 21.9 sec;   INR: 1.90 ratio                 Imaging:  reviewed

## 2019-12-03 NOTE — DIETITIAN INITIAL EVALUATION ADULT. - NAME AND PHONE
1) Continue Consistent Carbohydrate, Renal diet with snack. 2) RD will assess need for oral supplement upon interview. 3) RD will provide therapeutic diet education upon interview 1) Continue Consistent Carbohydrate, Renal diet with snack. 2) Therapeutic diet education provided; RD will continue to reinforce. 1) Continue Consistent Carbohydrate, Renal diet with snack, in-house and upon discharge. 2) Therapeutic diet education provided; RD will continue to reinforce education prior to discharge. 3) From a Nutrition perspective, pt is appropriate for liver transplant. Assessment communicated with outpatient liver Txp RD and Txp Team. 1) Continue Consistent Carbohydrate, Renal diet with snack, in-house and upon discharge. 2) Therapeutic diet education provided; RD will continue to reinforce education prior to discharge. 3) From a Nutrition perspective, pt is appropriate for liver transplant. Information/Assessment communicated with outpatient liver Txp RD and Txp Hepatology Team.

## 2019-12-03 NOTE — DIETITIAN INITIAL EVALUATION ADULT. - PERTINENT LABORATORY DATA
12-03 @ 06:16: Sodium 129<L>, Potassium 3.4<L>, Chloride 89<L>, Calcium 10.6<H>, BUN 47<H>, Creatinine 2.22<H>, BG 92, Alk Phos 131<H>, ALT/SGPT 18, AST/SGOT 28, Total Protein 6.8, Albumin 3.9, Total Bilirubin 8.0<H>  12-03 @ 00:57: Total Protein 6.6  Ammonia (12/2) 108 <H>  AbA1c 4.9% (10/9/19)  POCT Blood Glucose.: 100 mg/dL (03 Dec 2019 08:42)  POCT Blood Glucose.: 168 mg/dL (02 Dec 2019 22:07)  POCT Blood Glucose.: 176 mg/dL (02 Dec 2019 17:12)  POCT Blood Glucose.: 167 mg/dL (02 Dec 2019 12:39)

## 2019-12-03 NOTE — PROGRESS NOTE ADULT - ASSESSMENT
pt with above history p/w difficulty ambulation / weakness       - flap tremor + on Exam , likely hepatic encephalopathy -  pt having bms after lactulose enema, monitor mental status closely     - hyperbilirubinemia-GI f/u , cont rifaximin     - ARF on ckd stage 3 - likely hepatorenal , avoid nephrotoxic agents bladder scan to r/o retention , renal f/u - pt on aldactone+ Lasix+ zaroxolyn - pt doesn't appear fluid overloaded at present, hold diuretics, gentle iv fluids with close monitor of pts volume status    - dm2 - iss     - hyponatremia- renal f/u     - Thrombocytopenia - likely sec to liver pathology     - coagulopathy - likely sec to liver pathology , monitor closely for active signs of bleeding , vit k     - HLD - pt om parvastaTIN     had extensive lengthy discussion with pts wife /sons about pts current clinical status, management plan &  prognosis - guarded prognosis - palliative eval    ppi

## 2019-12-03 NOTE — PROGRESS NOTE ADULT - PROBLEM SELECTOR PLAN 2
- recent EGD 5/2019 non-bleeding duodenal ulcers and No evidence of varices   - cirrhosis 2/2 NAFLD   - US Abd without ascites; CT abd without new gi pathology; no liver lesions  - continue xifaxan and lactulose as above   - Protonix 40mg PO QD   - liver transplant evaluation in progress; appreciate hepatology input

## 2019-12-03 NOTE — PROGRESS NOTE ADULT - SUBJECTIVE AND OBJECTIVE BOX
[facility]    Sushila Dhaliwal, Doug, Morenita, Ayo, Clay, Colleen      [phone]    CHIEF COMPLAINT: Patient is a 63y old  Male who presents with a chief complaint of difficulty to ambulate/ weakness (03 Dec 2019 09:59)      Follow Up: diast hf    Interim history: The patient reports no new symptoms.  Denies chest discomfort and shortness of breath.  No abdominal pain.  No new neurologic symptoms.      MEDICATIONS  (STANDING):  atorvastatin 10 milliGRAM(s) Oral at bedtime  dextrose 5%. 1000 milliLiter(s) (50 mL/Hr) IV Continuous <Continuous>  dextrose 50% Injectable 12.5 Gram(s) IV Push once  dextrose 50% Injectable 25 Gram(s) IV Push once  dextrose 50% Injectable 25 Gram(s) IV Push once  insulin glargine Injectable (LANTUS) 20 Unit(s) SubCutaneous at bedtime  insulin lispro (HumaLOG) corrective regimen sliding scale   SubCutaneous three times a day before meals  insulin lispro (HumaLOG) corrective regimen sliding scale   SubCutaneous at bedtime  lactulose Syrup 30 Gram(s) Oral every 4 hours  midodrine. 10 milliGRAM(s) Oral three times a day  pantoprazole    Tablet 40 milliGRAM(s) Oral before breakfast  rifAXIMin 550 milliGRAM(s) Oral two times a day  sucralfate 1 Gram(s) Oral two times a day  tamsulosin 0.4 milliGRAM(s) Oral at bedtime    MEDICATIONS  (PRN):  dextrose 40% Gel 15 Gram(s) Oral once PRN Blood Glucose LESS THAN 70 milliGRAM(s)/deciliter  glucagon  Injectable 1 milliGRAM(s) IntraMuscular once PRN Glucose LESS THAN 70 milligrams/deciliter      REVIEW OF SYSTEMS:  eye, ent, GI, , allergic, dermatologic, musculoskeletal and neurologic are negative except as described above    Vital Signs Last 24 Hrs  T(C): 37.1 (03 Dec 2019 04:10), Max: 37.1 (02 Dec 2019 21:24)  T(F): 98.7 (03 Dec 2019 04:10), Max: 98.7 (02 Dec 2019 21:24)  HR: 83 (03 Dec 2019 04:10) (75 - 83)  BP: 119/62 (03 Dec 2019 04:10) (105/61 - 119/62)  BP(mean): --  RR: 18 (03 Dec 2019 04:10) (18 - 18)  SpO2: 96% (03 Dec 2019 04:10) (96% - 97%)    I&O's Summary    02 Dec 2019 07:01  -  03 Dec 2019 07:00  --------------------------------------------------------  IN: 1040 mL / OUT: 1400 mL / NET: -360 mL        Telemetry past 24h:    PHYSICAL EXAM:    Constitutional: well-nourished, well-developed, NAD   HEENT:  MMM, sclerae anicteric, conjunctivae clear, no oral cyanosis.  Pulmonary: Non-labored, breath sounds are clear bilaterally, No wheezing, rales or rhonchi  Cardiovascular: Regular, S1 and S2.  No murmur.  No rubs, gallops or clicks  Gastrointestinal: Bowel Sounds present, soft, nontender.   Lymph: No peripheral edema.   Neurological: Alert, no focal deficits  Skin: No rashes.  Psych:  Mood & affect appropriate    LABS: All Labs Reviewed:                        9.6    6.73  )-----------( x        ( 03 Dec 2019 08:56 )             27.1                         9.4    7.76  )-----------( 53       ( 02 Dec 2019 08:04 )             27.0                         9.5    6.95  )-----------( 62       ( 01 Dec 2019 06:13 )             26.9     03 Dec 2019 06:16    129    |  89     |  47     ----------------------------<  92     3.4     |  26     |  2.22   02 Dec 2019 06:00    128    |  87     |  59     ----------------------------<  119    3.8     |  27     |  2.49   01 Dec 2019 06:13    131    |  88     |  67     ----------------------------<  102    3.7     |  27     |  2.47     Ca    10.6       03 Dec 2019 06:16  Ca    10.1       02 Dec 2019 06:00  Ca    10.5       01 Dec 2019 06:13    TPro  6.8    /  Alb  3.9    /  TBili  8.0    /  DBili  x      /  AST  28     /  ALT  18     /  AlkPhos  131    03 Dec 2019 06:16  TPro  6.6    /  Alb  x      /  TBili  x      /  DBili  x      /  AST  x      /  ALT  x      /  AlkPhos  x      03 Dec 2019 00:57  TPro  6.6    /  Alb  3.8    /  TBili  8.3    /  DBili  x      /  AST  34     /  ALT  21     /  AlkPhos  131    02 Dec 2019 06:00    PT/INR - ( 03 Dec 2019 08:53 )   PT: 21.9 sec;   INR: 1.90 ratio               Blood Culture:      @ 09:10  TSH: 1.72      RADIOLOGY:    EKG:    Echo:    < from: TTE Echo Complete w/Doppler (10.25.19 @ 09:11) >    EXAM:  ECHO TRANSTHORACIC COMP W DOPP      PROCEDURE DATE:  Oct 25 2019   .      INTERPRETATION:  REPORT:    TRANSTHORACIC ECHOCARDIOGRAM REPORT         Patient Name:   JESSICA MARTIN Patient Location: Inpatient  Medical Rec #:  [id number]     Accession #:      [id number]  Account #:                   Height:           71.7 in 182.0 cm  YOB: 1955    Weight:           361.6 lb 164.00 kg  Patient Age:    63 years     BSA:              2.73 m²  Patient Gender: M            BP:   112/65 mmHg       Date of Exam:        10/25/2019 9:11:20 AM  Sonographer:         Carole Isidro  Referring Physician: Candida Suresh MD    Procedure:     2D Echo/Doppler/Color Doppler Complete and Echocardiogram   with                 Definity Contrast.  Indications:   Cardiac murmur, unspecified - R01.1  Diagnosis:     Cardiac murmur, unspecified - R01.1  Study Details: Technically difficult study. Study quality was adversely   affected                 due to body habitus and patient obesity.         2D AND M-MODE MEASUREMENTS (normal ranges within parentheses):  Left                 Normal   Aorta/Left            Normal  Ventricle:                    Atrium:  IVSd (2D):    1.07  (0.7-1.1) Aortic Root  3.30 cm (2.4-3.7)                 cm             (2D):  LVPWd (2D):   1.02  (0.7-1.1) Left Atrium  4.13 cm (1.9-4.0)                 cm             (2D):  LVIDd (2D):   5.06  (3.4-5.7) LA Volume     32.0                 cm             Index         ml/m²  LVIDs (2D):   3.03Right Ventricle:                 cm             TAPSE:           2.48 cm  LV FS (2D):   40.1   (>25%)                  %  Relative Wall 0.40   (<0.42)  Thickness    LV DIASTOLIC FUNCTION:  MV Peak E: 1.47 m/s E/e' Ratio: 17.10  MV Peak A: 1.12 m/s Decel Time: 217 msec  E/A Ratio: 1.31    SPECTRAL DOPPLER ANALYSIS (where applicable):  Mitral Valve:  MV P1/2 Time: 62.80 msec  MV Area, PHT: 3.50 cm²    Aortic Valve: AoV Max Philip: 1.61 m/s AoV Peak PG: 10.3 mmHg AoV Mean P.4 mmHg    LVOT Vmax: 1.25 m/s LVOT VTI: 0.282 m LVOT Diameter: 2.22 cm    AoV Area, Vmax: 3.00 cm² AoV Area, VTI: 3.17 cm² AoV Area, Vmn: 2.97 cm²  Ao VTI: 0.345  Tricuspid Valve and PA/RV Systolic Pressure: TR Max Velocity: 2.96 m/s RA   Pressure: 8 mmHg RVSP/PASP: 43.0 mmHg       PHYSICIAN INTERPRETATION:  Left Ventricle: Endocardial visualization was enhanced with intravenous   echo contrast. The left ventricular internal cavity size is normal. Left   ventricular wall thickness is normal.  Global LV systolic function was normal. Left ventricular ejection   fraction, by visual estimation, is 65 to 70%.  Right Ventricle: The right ventricular size is mildly enlarged. RV   systolic function is normal.  Left Atrium: The left atrium is normal in size.  Right Atrium: Normal right atrial size.  Pericardium: There is no evidence of pericardial effusion. Mild ascites   is noted.  Mitral Valve: The mitral valve is normal in structure. Mild thickening   and calcification of the anterior and posterior mitral valve leaflets.   Mitral leaflet mobility is normal. There is mild mitral annular   calcification. Trace mitral valve regurgitation is seen.  Tricuspid Valve: The tricuspid valve is normal in structure. Mild   tricuspid regurgitation is visualized. Estimated pulmonary artery   systolic pressure is 43.0 mmHg assuming a right atrial pressure of 8   mmHg, which is consistent with mild pulmonary hypertension.  Aortic Valve: The aortic valve is trileaflet. Sclerotic aortic valve with   decreased opening. Peak transaortic gradient equals 10.3 mmHg, mean   transaortic gradient equals 5.4 mmHg, the calculated aortic valve area   equals 3.17 cm² by the continuity equation consistent with normally   opening aortic valve. No evidence of aortic valve regurgitation is seen.  Pulmonic Valve: The pulmonic valve is normal. Trace pulmonic valve   regurgitation.  Aorta: The aortic root is normal in size and structure.  Pulmonary Artery: The pulmonary artery is of normal size and origin.  Venous: A normal flow pattern is recorded from the right upper pulmonary   vein. The inferior vena cava was normal sized, with respiratory size   variation less than 50%.  In comparison to the previous echocardiogram(s): There are no prior   studies on this patient for comparison purposes.       Summary:   1. Technically difficult and limited study due to body habitus.   2. Endocardial visualization was enhanced with intravenous echo contrast.   3. Normal global left ventricular systolic function.   4. Left ventricular ejection fraction, by visual estimation, is 65 to   70%.   5. The left atrium is normal in size.   6. Sclerotic aortic valve with decreased opening.   7. Mild thickening and calcification of the anterior and posterior   mitral valve leaflets.   8. Mild mitral annular calcification.   9. Mild to moderate ascites.  10. Estimated pulmonary artery systolic pressure is 43.0 mmHg assuming a   right atrial pressure of 8 mmHg, which is consistent with mild pulmonary   hypertension.    MD Anitra Electronically signed on 10/25/2019 at 12:55:12 PM              *** Final ***                  SANYA PALOMARES   This document has been electronically signed. Oct 25 2019  9:11AM            < end of copied text >

## 2019-12-03 NOTE — DIETITIAN INITIAL EVALUATION ADULT. - REASON INDICATOR FOR ASSESSMENT
INCOMPLETE NOTE - IN PROGRESS  Nutrition Assessment warranted for Liver Transplant Evaluation.  Information obtained from: medical record, prior RD notes. Pt interview pending.  Per chart: "63M with HTN, IDDM, BPH, obesity, ESLD 2/2 JEAN cirrhosis (+HE, ascites with SBP) admitted from [facility] on 11/26 due to HE/MISA." MELD-Na 33 (12/2). Nutrition Assessment warranted for Liver Transplant Evaluation.  Information obtained from: patient, medical record, prior RD notes.   Per chart: "63M with HTN, IDDM, BPH, obesity, ESLD 2/2 JEAN cirrhosis (+HE, ascites with SBP) admitted from [facility] on 11/26 due to HE/MISA." MELD-Na 33 (12/2). Nutrition Assessment warranted for Liver Transplant Evaluation.  Information obtained from: patient, medical record, prior RD notes. Information communicated with outpatient Txp RD and Transplant Hepatology Team.  Per chart: "63M with HTN, IDDM, BPH, obesity, ESLD 2/2 JEAN cirrhosis (+HE, ascites with SBP) admitted from [facility] on 11/26 due to HE/MISA." MELD-Na 33 (12/2).

## 2019-12-03 NOTE — DIETITIAN INITIAL EVALUATION ADULT. - ETIOLOGY
increased physiologic demand of stress factor and catabolic illness limited prior education on transplant nutrition therapy and food safety guidelines

## 2019-12-03 NOTE — DIETITIAN INITIAL EVALUATION ADULT. - PHYSICAL APPEARANCE
other (specify)/obese/ht: 6 feet 1 inches, admit wt: 255 pounds, BMI: 33.6 Kg/m2, IBW: 184 pounds (+/- 10%), 139% IBW Edema: none noted  Skin: no pressure injuries noted per nursing flowsheet  Nutrition Focused Physical Exam: Edema: none noted  Skin: no pressure injuries noted per nursing flowsheet  Nutrition Focused Physical Exam: Pt noted with mild muscle depletion of temples and calves, mild fat depletion on triceps and biceps.

## 2019-12-03 NOTE — DIETITIAN INITIAL EVALUATION ADULT. - NS FNS WEIGHT CHANGE REASON
Pt reports his original weight loss was intentional, with subsequent weight shifts attributable to fluid loss with diuresis.

## 2019-12-04 LAB
AFP-TM SERPL-MCNC: 2.1 NG/ML — SIGNIFICANT CHANGE UP
ALBUMIN SERPL ELPH-MCNC: 4 G/DL — SIGNIFICANT CHANGE UP (ref 3.3–5)
ALBUMIN SERPL ELPH-MCNC: 4.2 G/DL — SIGNIFICANT CHANGE UP (ref 3.3–5)
ALP SERPL-CCNC: 111 U/L — SIGNIFICANT CHANGE UP (ref 40–120)
ALP SERPL-CCNC: 126 U/L — HIGH (ref 40–120)
ALT FLD-CCNC: 18 U/L — SIGNIFICANT CHANGE UP (ref 10–45)
ALT FLD-CCNC: 19 U/L — SIGNIFICANT CHANGE UP (ref 10–45)
AMMONIA BLD-MCNC: 58 UMOL/L — HIGH (ref 11–55)
AMMONIA BLD-MCNC: 63 UMOL/L — HIGH (ref 11–55)
ANION GAP SERPL CALC-SCNC: 14 MMOL/L — SIGNIFICANT CHANGE UP (ref 5–17)
ANION GAP SERPL CALC-SCNC: 15 MMOL/L — SIGNIFICANT CHANGE UP (ref 5–17)
APPEARANCE UR: ABNORMAL
APPEARANCE UR: ABNORMAL
APTT BLD: 37.7 SEC — HIGH (ref 27.5–36.3)
AST SERPL-CCNC: 27 U/L — SIGNIFICANT CHANGE UP (ref 10–40)
AST SERPL-CCNC: 31 U/L — SIGNIFICANT CHANGE UP (ref 10–40)
AT III ACT/NOR PPP CHRO: 32 % — LOW (ref 85–135)
BACTERIA # UR AUTO: ABNORMAL
BACTERIA # UR AUTO: ABNORMAL
BASOPHILS # BLD AUTO: 0.04 K/UL — SIGNIFICANT CHANGE UP (ref 0–0.2)
BASOPHILS NFR BLD AUTO: 0.5 % — SIGNIFICANT CHANGE UP (ref 0–2)
BILIRUB SERPL-MCNC: 8.1 MG/DL — HIGH (ref 0.2–1.2)
BILIRUB SERPL-MCNC: 8.6 MG/DL — HIGH (ref 0.2–1.2)
BILIRUB UR-MCNC: NEGATIVE — SIGNIFICANT CHANGE UP
BILIRUB UR-MCNC: NEGATIVE — SIGNIFICANT CHANGE UP
BUN SERPL-MCNC: 39 MG/DL — HIGH (ref 7–23)
BUN SERPL-MCNC: 45 MG/DL — HIGH (ref 7–23)
CALCIUM SERPL-MCNC: 10.5 MG/DL — SIGNIFICANT CHANGE UP (ref 8.4–10.5)
CALCIUM SERPL-MCNC: 10.7 MG/DL — HIGH (ref 8.4–10.5)
CARDIOLIPIN AB SER-ACNC: NEGATIVE — SIGNIFICANT CHANGE UP
CHLORIDE SERPL-SCNC: 92 MMOL/L — LOW (ref 96–108)
CHLORIDE SERPL-SCNC: 93 MMOL/L — LOW (ref 96–108)
CO2 SERPL-SCNC: 26 MMOL/L — SIGNIFICANT CHANGE UP (ref 22–31)
CO2 SERPL-SCNC: 26 MMOL/L — SIGNIFICANT CHANGE UP (ref 22–31)
COLOR SPEC: YELLOW — SIGNIFICANT CHANGE UP
COLOR SPEC: YELLOW — SIGNIFICANT CHANGE UP
CREAT SERPL-MCNC: 1.74 MG/DL — HIGH (ref 0.5–1.3)
CREAT SERPL-MCNC: 1.99 MG/DL — HIGH (ref 0.5–1.3)
DIFF PNL FLD: ABNORMAL
DIFF PNL FLD: ABNORMAL
DRVVT SCREEN TO CONFIRM RATIO: SIGNIFICANT CHANGE UP
EOSINOPHIL # BLD AUTO: 0.06 K/UL — SIGNIFICANT CHANGE UP (ref 0–0.5)
EOSINOPHIL NFR BLD AUTO: 0.8 % — SIGNIFICANT CHANGE UP (ref 0–6)
EPI CELLS # UR: 0 /HPF — SIGNIFICANT CHANGE UP
EPI CELLS # UR: 1 /HPF — SIGNIFICANT CHANGE UP (ref 0–5)
ESTIMATED AVERAGE GLUCOSE: 120 MG/DL — HIGH (ref 68–114)
GLUCOSE BLDC GLUCOMTR-MCNC: 115 MG/DL — HIGH (ref 70–99)
GLUCOSE BLDC GLUCOMTR-MCNC: 132 MG/DL — HIGH (ref 70–99)
GLUCOSE BLDC GLUCOMTR-MCNC: 176 MG/DL — HIGH (ref 70–99)
GLUCOSE BLDC GLUCOMTR-MCNC: 177 MG/DL — HIGH (ref 70–99)
GLUCOSE SERPL-MCNC: 132 MG/DL — HIGH (ref 70–99)
GLUCOSE SERPL-MCNC: 176 MG/DL — HIGH (ref 70–99)
GLUCOSE UR QL: NEGATIVE — SIGNIFICANT CHANGE UP
GLUCOSE UR QL: NEGATIVE — SIGNIFICANT CHANGE UP
HAV IGG SER QL IA: REACTIVE
HAV IGM SER-ACNC: SIGNIFICANT CHANGE UP
HBA1C BLD-MCNC: 5.8 % — HIGH (ref 4–5.6)
HBV CORE AB SER-ACNC: SIGNIFICANT CHANGE UP
HBV SURFACE AB SER-ACNC: <3 MIU/ML — LOW
HBV SURFACE AB SER-ACNC: SIGNIFICANT CHANGE UP
HBV SURFACE AG SER-ACNC: SIGNIFICANT CHANGE UP
HCT VFR BLD CALC: 25.9 % — LOW (ref 39–50)
HCT VFR BLD CALC: 28.6 % — LOW (ref 39–50)
HCV AB S/CO SERPL IA: 0.14 S/CO — SIGNIFICANT CHANGE UP (ref 0–0.99)
HCV AB SERPL-IMP: SIGNIFICANT CHANGE UP
HCV RNA FLD QL NAA+PROBE: SIGNIFICANT CHANGE UP
HCV RNA SPEC QL PROBE+SIG AMP: SIGNIFICANT CHANGE UP
HCYS SERPL-MCNC: 22.5 UMOL/L — HIGH
HGB BLD-MCNC: 9.1 G/DL — LOW (ref 13–17)
HGB BLD-MCNC: 9.8 G/DL — LOW (ref 13–17)
HYALINE CASTS # UR AUTO: 3 /LPF — HIGH (ref 0–2)
HYALINE CASTS # UR AUTO: 6 /LPF — SIGNIFICANT CHANGE UP (ref 0–7)
IMM GRANULOCYTES NFR BLD AUTO: 0.3 % — SIGNIFICANT CHANGE UP (ref 0–1.5)
INR BLD: 1.97 RATIO — HIGH (ref 0.88–1.16)
KETONES UR-MCNC: NEGATIVE — SIGNIFICANT CHANGE UP
KETONES UR-MCNC: NEGATIVE — SIGNIFICANT CHANGE UP
LA NT DPL PPP QL: 35.9 SEC — SIGNIFICANT CHANGE UP
LEUKOCYTE ESTERASE UR-ACNC: ABNORMAL
LEUKOCYTE ESTERASE UR-ACNC: ABNORMAL
LYMPHOCYTES # BLD AUTO: 0.83 K/UL — LOW (ref 1–3.3)
LYMPHOCYTES # BLD AUTO: 11.2 % — LOW (ref 13–44)
MAGNESIUM SERPL-MCNC: 2 MG/DL — SIGNIFICANT CHANGE UP (ref 1.6–2.6)
MCHC RBC-ENTMCNC: 33.4 PG — SIGNIFICANT CHANGE UP (ref 27–34)
MCHC RBC-ENTMCNC: 33.8 PG — SIGNIFICANT CHANGE UP (ref 27–34)
MCHC RBC-ENTMCNC: 34.3 GM/DL — SIGNIFICANT CHANGE UP (ref 32–36)
MCHC RBC-ENTMCNC: 35.1 GM/DL — SIGNIFICANT CHANGE UP (ref 32–36)
MCV RBC AUTO: 96.3 FL — SIGNIFICANT CHANGE UP (ref 80–100)
MCV RBC AUTO: 97.6 FL — SIGNIFICANT CHANGE UP (ref 80–100)
MITOCHONDRIA AB SER-ACNC: SIGNIFICANT CHANGE UP
MONOCYTES # BLD AUTO: 0.89 K/UL — SIGNIFICANT CHANGE UP (ref 0–0.9)
MONOCYTES NFR BLD AUTO: 12 % — SIGNIFICANT CHANGE UP (ref 2–14)
NEUTROPHILS # BLD AUTO: 5.55 K/UL — SIGNIFICANT CHANGE UP (ref 1.8–7.4)
NEUTROPHILS NFR BLD AUTO: 75.2 % — SIGNIFICANT CHANGE UP (ref 43–77)
NITRITE UR-MCNC: NEGATIVE — SIGNIFICANT CHANGE UP
NITRITE UR-MCNC: NEGATIVE — SIGNIFICANT CHANGE UP
NRBC # BLD: 0 /100 WBCS — SIGNIFICANT CHANGE UP (ref 0–0)
PH UR: 6 — SIGNIFICANT CHANGE UP (ref 5–8)
PH UR: 6.5 — SIGNIFICANT CHANGE UP (ref 5–8)
PHOSPHATE SERPL-MCNC: 2.2 MG/DL — LOW (ref 2.5–4.5)
PLATELET # BLD AUTO: 36 K/UL — LOW (ref 150–400)
PLATELET # BLD AUTO: 46 K/UL — LOW (ref 150–400)
POTASSIUM SERPL-MCNC: 3.7 MMOL/L — SIGNIFICANT CHANGE UP (ref 3.5–5.3)
POTASSIUM SERPL-MCNC: 3.9 MMOL/L — SIGNIFICANT CHANGE UP (ref 3.5–5.3)
POTASSIUM SERPL-SCNC: 3.7 MMOL/L — SIGNIFICANT CHANGE UP (ref 3.5–5.3)
POTASSIUM SERPL-SCNC: 3.9 MMOL/L — SIGNIFICANT CHANGE UP (ref 3.5–5.3)
PROT C ACT/NOR PPP: 26 % — LOW (ref 74–150)
PROT SERPL-MCNC: 6.6 G/DL — SIGNIFICANT CHANGE UP (ref 6–8.3)
PROT SERPL-MCNC: 7 G/DL — SIGNIFICANT CHANGE UP (ref 6–8.3)
PROT UR-MCNC: 100 — SIGNIFICANT CHANGE UP
PROT UR-MCNC: ABNORMAL
PROTHROM AB SERPL-ACNC: 23.1 SEC — HIGH (ref 10–13.1)
PSA FLD-MCNC: 4.81 NG/ML — HIGH (ref 0–4)
RBC # BLD: 2.69 M/UL — LOW (ref 4.2–5.8)
RBC # BLD: 2.93 M/UL — LOW (ref 4.2–5.8)
RBC # FLD: 18.7 % — HIGH (ref 10.3–14.5)
RBC # FLD: 18.9 % — HIGH (ref 10.3–14.5)
RBC CASTS # UR COMP ASSIST: 11 /HPF — HIGH (ref 0–4)
RBC CASTS # UR COMP ASSIST: 95 /HPF — HIGH (ref 0–4)
SMOOTH MUSCLE AB SER-ACNC: SIGNIFICANT CHANGE UP
SODIUM SERPL-SCNC: 133 MMOL/L — LOW (ref 135–145)
SODIUM SERPL-SCNC: 133 MMOL/L — LOW (ref 135–145)
SP GR SPEC: 1.02 — SIGNIFICANT CHANGE UP (ref 1.01–1.02)
SP GR SPEC: 1.02 — SIGNIFICANT CHANGE UP (ref 1.01–1.02)
T PALLIDUM AB TITR SER: NEGATIVE — SIGNIFICANT CHANGE UP
UROBILINOGEN FLD QL: NEGATIVE — SIGNIFICANT CHANGE UP
UROBILINOGEN FLD QL: SIGNIFICANT CHANGE UP
WBC # BLD: 7.39 K/UL — SIGNIFICANT CHANGE UP (ref 3.8–10.5)
WBC # BLD: 8.4 K/UL — SIGNIFICANT CHANGE UP (ref 3.8–10.5)
WBC # FLD AUTO: 7.39 K/UL — SIGNIFICANT CHANGE UP (ref 3.8–10.5)
WBC # FLD AUTO: 8.4 K/UL — SIGNIFICANT CHANGE UP (ref 3.8–10.5)
WBC UR QL: 501 /HPF — HIGH (ref 0–5)
WBC UR QL: 93 /HPF — HIGH (ref 0–5)

## 2019-12-04 PROCEDURE — 99223 1ST HOSP IP/OBS HIGH 75: CPT

## 2019-12-04 PROCEDURE — 99232 SBSQ HOSP IP/OBS MODERATE 35: CPT

## 2019-12-04 PROCEDURE — G0452: CPT | Mod: 26

## 2019-12-04 RX ORDER — ALBUMIN HUMAN 25 %
100 VIAL (ML) INTRAVENOUS EVERY 8 HOURS
Refills: 0 | Status: COMPLETED | OUTPATIENT
Start: 2019-12-04 | End: 2019-12-05

## 2019-12-04 RX ORDER — SODIUM CHLORIDE 9 MG/ML
250 INJECTION INTRAMUSCULAR; INTRAVENOUS; SUBCUTANEOUS ONCE
Refills: 0 | Status: COMPLETED | OUTPATIENT
Start: 2019-12-04 | End: 2019-12-04

## 2019-12-04 RX ADMIN — Medication 1: at 13:14

## 2019-12-04 RX ADMIN — Medication 1: at 09:12

## 2019-12-04 RX ADMIN — MIDODRINE HYDROCHLORIDE 20 MILLIGRAM(S): 2.5 TABLET ORAL at 06:16

## 2019-12-04 RX ADMIN — Medication 1 GRAM(S): at 19:01

## 2019-12-04 RX ADMIN — PANTOPRAZOLE SODIUM 40 MILLIGRAM(S): 20 TABLET, DELAYED RELEASE ORAL at 06:15

## 2019-12-04 RX ADMIN — Medication 50 MILLILITER(S): at 12:06

## 2019-12-04 RX ADMIN — LACTULOSE 30 GRAM(S): 10 SOLUTION ORAL at 02:17

## 2019-12-04 RX ADMIN — MIDODRINE HYDROCHLORIDE 20 MILLIGRAM(S): 2.5 TABLET ORAL at 19:01

## 2019-12-04 RX ADMIN — SODIUM CHLORIDE 2 GRAM(S): 9 INJECTION INTRAMUSCULAR; INTRAVENOUS; SUBCUTANEOUS at 06:15

## 2019-12-04 RX ADMIN — Medication 1 GRAM(S): at 06:15

## 2019-12-04 RX ADMIN — INSULIN GLARGINE 20 UNIT(S): 100 INJECTION, SOLUTION SUBCUTANEOUS at 22:04

## 2019-12-04 RX ADMIN — LACTULOSE 30 GRAM(S): 10 SOLUTION ORAL at 06:15

## 2019-12-04 RX ADMIN — Medication 50 MILLILITER(S): at 22:47

## 2019-12-04 RX ADMIN — SODIUM CHLORIDE 250 MILLILITER(S): 9 INJECTION INTRAMUSCULAR; INTRAVENOUS; SUBCUTANEOUS at 19:02

## 2019-12-04 RX ADMIN — TAMSULOSIN HYDROCHLORIDE 0.4 MILLIGRAM(S): 0.4 CAPSULE ORAL at 22:04

## 2019-12-04 RX ADMIN — MIDODRINE HYDROCHLORIDE 20 MILLIGRAM(S): 2.5 TABLET ORAL at 11:55

## 2019-12-04 RX ADMIN — ATORVASTATIN CALCIUM 10 MILLIGRAM(S): 80 TABLET, FILM COATED ORAL at 22:04

## 2019-12-04 NOTE — PROGRESS NOTE ADULT - PROBLEM SELECTOR PLAN 1
- improving  - titrate lactulose for 2-3BM/day; please give Lactulose Enemas if pt unable to tolerate PO intake   - continue xifaxan bid   - diet as tolerated with aspiration precautions

## 2019-12-04 NOTE — PROGRESS NOTE ADULT - SUBJECTIVE AND OBJECTIVE BOX
[facility] -- Sushila Dhaliwal, Morenita Camacho, Clay Rollins Savella  Office # [phone]      Follow Up:  abn ekg    Subjective/Observations: Patient seen and examined. Events noted. Resting comfortably in bed. No complaints of chest pain, dyspnea, or palpitations reported. No signs of orthopnea or PND.       REVIEW OF SYSTEMS: All other review of systems is negative unless indicated above    PAST MEDICAL & SURGICAL HISTORY:  GIB (gastrointestinal bleeding)  GERD with esophagitis: Gastritis &amp; Non Bleeding Ulcers  Hepatic encephalopathy  Obesity  Fatty liver disease, nonalcoholic  Renal stones: 25 years ago  Hypertension  Neuropathy  Hypercholesteremia  Diabetes  S/P cholecystectomy      MEDICATIONS  (STANDING):  atorvastatin 10 milliGRAM(s) Oral at bedtime  dextrose 5%. 1000 milliLiter(s) (50 mL/Hr) IV Continuous <Continuous>  dextrose 50% Injectable 12.5 Gram(s) IV Push once  dextrose 50% Injectable 25 Gram(s) IV Push once  dextrose 50% Injectable 25 Gram(s) IV Push once  insulin glargine Injectable (LANTUS) 20 Unit(s) SubCutaneous at bedtime  insulin lispro (HumaLOG) corrective regimen sliding scale   SubCutaneous three times a day before meals  insulin lispro (HumaLOG) corrective regimen sliding scale   SubCutaneous at bedtime  lactulose Syrup 30 Gram(s) Oral every 4 hours  midodrine. 20 milliGRAM(s) Oral three times a day  pantoprazole    Tablet 40 milliGRAM(s) Oral before breakfast  rifAXIMin 550 milliGRAM(s) Oral two times a day  sucralfate 1 Gram(s) Oral two times a day  tamsulosin 0.4 milliGRAM(s) Oral at bedtime    MEDICATIONS  (PRN):  dextrose 40% Gel 15 Gram(s) Oral once PRN Blood Glucose LESS THAN 70 milliGRAM(s)/deciliter  glucagon  Injectable 1 milliGRAM(s) IntraMuscular once PRN Glucose LESS THAN 70 milligrams/deciliter      Allergies    codeine (Anaphylaxis)    Intolerances    NO  RED MEAT (Unknown)          Vital Signs Last 24 Hrs  T(C): 37.3 (04 Dec 2019 04:55), Max: 37.3 (04 Dec 2019 04:55)  T(F): 99.1 (04 Dec 2019 04:55), Max: 99.1 (04 Dec 2019 04:55)  HR: 93 (04 Dec 2019 04:55) (78 - 93)  BP: 142/65 (04 Dec 2019 04:55) (120/66 - 142/65)  BP(mean): --  RR: 18 (04 Dec 2019 04:55) (17 - 18)  SpO2: 98% (04 Dec 2019 04:55) (94% - 98%)    I&O's Summary    03 Dec 2019 07:01  -  04 Dec 2019 07:00  --------------------------------------------------------  IN: 830 mL / OUT: 650 mL / NET: 180 mL          PHYSICAL EXAM:     Constitutional: NAD, awake   HEENT: Moist Mucous Membranes, Anicteric  Pulmonary: Non-labored, breath sounds are clear bilaterally, No wheezing, rales or rhonchi  Cardiovascular: Regular, S1 and S2, No murmurs, rubs, gallops or clicks  Gastrointestinal: Bowel Sounds present, soft, nontender.   Lymph: No peripheral edema. No lymphadenopathy.  Skin: No visible rashes or ulcers.  Psych:  Mood & affect appropriate for situation    LABS: All Labs Reviewed:                        9.6    6.73  )-----------( 49       ( 03 Dec 2019 08:56 )             27.1                         9.4    7.76  )-----------( 53       ( 02 Dec 2019 08:04 )             27.0     04 Dec 2019 07:13    133    |  93     |  45     ----------------------------<  176    3.9     |  26     |  1.99   03 Dec 2019 20:10    133    |  93     |  44     ----------------------------<  171    3.7     |  27     |  1.96   03 Dec 2019 06:16    129    |  89     |  47     ----------------------------<  92     3.4     |  26     |  2.22     Ca    10.7       04 Dec 2019 07:13  Ca    10.3       03 Dec 2019 20:10  Ca    10.6       03 Dec 2019 06:16  Phos  2.2       04 Dec 2019 07:13  Mg     2.0       04 Dec 2019 07:13    TPro  7.0    /  Alb  4.2    /  TBili  8.6    /  DBili  2.7    /  AST  31     /  ALT  19     /  AlkPhos  126    04 Dec 2019 07:13  TPro  6.8    /  Alb  3.9    /  TBili  8.0    /  DBili  x      /  AST  28     /  ALT  18     /  AlkPhos  131    03 Dec 2019 06:16  TPro  6.6    /  Alb  3.9    /  TBili  x      /  DBili  x      /  AST  x      /  ALT  x      /  AlkPhos  x      03 Dec 2019 00:57    PT/INR - ( 03 Dec 2019 08:53 )   PT: 21.9 sec;   INR: 1.90 ratio

## 2019-12-04 NOTE — PROGRESS NOTE ADULT - SUBJECTIVE AND OBJECTIVE BOX
Patient is a 63y Male whom presented to the hospital with ckd and dena   seen in am event is noted   PAST MEDICAL & SURGICAL HISTORY:  GIB (gastrointestinal bleeding)  GERD with esophagitis: Gastritis &amp; Non Bleeding Ulcers  Hepatic encephalopathy  Obesity  Fatty liver disease, nonalcoholic  Renal stones: 25 years ago  Hypertension  Neuropathy  Hypercholesteremia  Diabetes  S/P cholecystectomy      MEDICATIONS  (STANDING):  dextrose 5%. 1000 milliLiter(s) (50 mL/Hr) IV Continuous <Continuous>  dextrose 50% Injectable 12.5 Gram(s) IV Push once  dextrose 50% Injectable 25 Gram(s) IV Push once  dextrose 50% Injectable 25 Gram(s) IV Push once  insulin lispro (HumaLOG) corrective regimen sliding scale   SubCutaneous three times a day before meals  insulin lispro (HumaLOG) corrective regimen sliding scale   SubCutaneous at bedtime      Allergies    codeine (Anaphylaxis)    Intolerances    NO  RED MEAT (Unknown)                                            9.8    7.39  )-----------( 46       ( 04 Dec 2019 08:38 )             28.6       CBC Full  -  ( 04 Dec 2019 08:38 )  WBC Count : 7.39 K/uL  RBC Count : 2.93 M/uL  Hemoglobin : 9.8 g/dL  Hematocrit : 28.6 %  Platelet Count - Automated : 46 K/uL  Mean Cell Volume : 97.6 fl  Mean Cell Hemoglobin : 33.4 pg  Mean Cell Hemoglobin Concentration : 34.3 gm/dL  Auto Neutrophil # : 5.55 K/uL  Auto Lymphocyte # : 0.83 K/uL  Auto Monocyte # : 0.89 K/uL  Auto Eosinophil # : 0.06 K/uL  Auto Basophil # : 0.04 K/uL  Auto Neutrophil % : 75.2 %  Auto Lymphocyte % : 11.2 %  Auto Monocyte % : 12.0 %  Auto Eosinophil % : 0.8 %  Auto Basophil % : 0.5 %          133<L>  |  93<L>  |  45<H>  ----------------------------<  176<H>  3.9   |  26  |  1.99<H>    Ca    10.7<H>      04 Dec 2019 07:13  Phos  2.2     12-  Mg     2.0     12-    TPro  7.0  /  Alb  4.2  /  TBili  8.6<H>  /  DBili  2.7<H>  /  AST  31  /  ALT  19  /  AlkPhos  126<H>  12-04      CAPILLARY BLOOD GLUCOSE      POCT Blood Glucose.: 132 mg/dL (04 Dec 2019 17:48)  POCT Blood Glucose.: 177 mg/dL (04 Dec 2019 12:31)  POCT Blood Glucose.: 176 mg/dL (04 Dec 2019 08:29)  POCT Blood Glucose.: 160 mg/dL (03 Dec 2019 21:58)      Vital Signs Last 24 Hrs  T(C): 37.4 (04 Dec 2019 12:54), Max: 37.4 (04 Dec 2019 12:54)  T(F): 99.4 (04 Dec 2019 12:54), Max: 99.4 (04 Dec 2019 12:54)  HR: 90 (04 Dec 2019 16:39) (78 - 95)  BP: 126/67 (04 Dec 2019 16:39) (118/62 - 142/65)  BP(mean): --  RR: 18 (04 Dec 2019 12:54) (17 - 18)  SpO2: 97% (04 Dec 2019 16:39) (96% - 98%)    Urinalysis Basic - ( 04 Dec 2019 16:50 )    Color: Yellow / Appearance: Slightly Turbid / S.018 / pH: x  Gluc: x / Ketone: Negative  / Bili: Negative / Urobili: Negative   Blood: x / Protein: 100 / Nitrite: Negative   Leuk Esterase: Large / RBC: 95 /hpf / WBC 93 /HPF   Sq Epi: x / Non Sq Epi: 0 /hpf / Bacteria: Many        PT/INR - ( 04 Dec 2019 08:45 )   PT: 23.1 sec;   INR: 1.97 ratio         PTT - ( 04 Dec 2019 08:45 )  PTT:37.7 sec     SOCIAL HISTORY:  Denies ETOh,Smoking,     FAMILY HISTORY:  Family history of type 2 diabetes mellitus  Family history of hypertension  Family history of stomach cancer      REVIEW OF SYSTEMS:    CONSTITUTIONAL: No weakness, fevers or chills  RESPIRATORY: No cough, wheezing, hemoptysis; No shortness of breath  CARDIOVASCULAR: No chest pain or palpitations  GASTROINTESTINAL: No abdominal or epigastric pain. No nausea, vomiting,     No diarrhea or constipation. No melena   GENITOURINARY: No dysuria, frequency or hematuria  NEUROLOGICAL: No numbness or weakness  SKIN: dry                           PHYSICAL EXAM:    Constitutional: NAD  HEENT: conjunctive   clear   Neck:  No JVD  Respiratory: CTAB  Cardiovascular: S1 and S2  Gastrointestinal: BS+, soft, NT/ND  Extremities: No peripheral edema  Neurological:  no focal deficits  Psychiatric: Normal mood, normal affect  Skin: dry   Access: Not applicable

## 2019-12-04 NOTE — PROGRESS NOTE ADULT - SUBJECTIVE AND OBJECTIVE BOX
INTERVAL HPI/OVERNIGHT EVENTS:    over 6 bms   no abd pain   mild nausea   no vomiting     MEDICATIONS  (STANDING):  albumin human 25% IVPB 100 milliLiter(s) IV Intermittent every 8 hours  atorvastatin 10 milliGRAM(s) Oral at bedtime  dextrose 5%. 1000 milliLiter(s) (50 mL/Hr) IV Continuous <Continuous>  dextrose 50% Injectable 12.5 Gram(s) IV Push once  dextrose 50% Injectable 25 Gram(s) IV Push once  dextrose 50% Injectable 25 Gram(s) IV Push once  insulin glargine Injectable (LANTUS) 20 Unit(s) SubCutaneous at bedtime  insulin lispro (HumaLOG) corrective regimen sliding scale   SubCutaneous three times a day before meals  insulin lispro (HumaLOG) corrective regimen sliding scale   SubCutaneous at bedtime  lactulose Syrup 30 Gram(s) Oral every 4 hours  midodrine. 20 milliGRAM(s) Oral three times a day  pantoprazole    Tablet 40 milliGRAM(s) Oral before breakfast  rifAXIMin 550 milliGRAM(s) Oral two times a day  sucralfate 1 Gram(s) Oral two times a day  tamsulosin 0.4 milliGRAM(s) Oral at bedtime    MEDICATIONS  (PRN):  dextrose 40% Gel 15 Gram(s) Oral once PRN Blood Glucose LESS THAN 70 milliGRAM(s)/deciliter  glucagon  Injectable 1 milliGRAM(s) IntraMuscular once PRN Glucose LESS THAN 70 milligrams/deciliter      Allergies    codeine (Anaphylaxis)    Intolerances    NO  RED MEAT (Unknown)      Review of Systems:    General:  No wt loss, fevers, chills, night sweats, fatigue   Eyes:  Good vision, no reported pain  ENT:  No sore throat, pain, runny nose, dysphagia  CV:  No pain, palpitations, hypo/hypertension  Resp:  No dyspnea, cough, tachypnea, wheezing  GI:  No pain, No nausea, No vomiting, +diarrhea, No constipation, No weight loss, No fever, No pruritis, No rectal bleeding, No melena, No dysphagia  :  No pain, bleeding, incontinence, nocturia  Muscle:  No pain, weakness  Neuro:  No weakness, tingling, memory problems  Psych:  No fatigue, insomnia, mood problems, depression  Endocrine:  No polyuria, polydypsia, cold/heat intolerance  Heme:  No petechiae, ecchymosis, easy bruisability  Skin:  No rash, tattoos, scars, edema      Vital Signs Last 24 Hrs  T(C): 37.4 (04 Dec 2019 12:54), Max: 37.4 (04 Dec 2019 12:54)  T(F): 99.4 (04 Dec 2019 12:54), Max: 99.4 (04 Dec 2019 12:54)  HR: 95 (04 Dec 2019 12:54) (78 - 95)  BP: 118/62 (04 Dec 2019 12:54) (118/62 - 142/65)  BP(mean): --  RR: 18 (04 Dec 2019 12:54) (17 - 18)  SpO2: 98% (04 Dec 2019 12:54) (96% - 98%)    PHYSICAL EXAM:    Constitutional: NAD  HEENT: EOMI, throat clear  Neck: No LAD, supple  Respiratory: CTA and P  Cardiovascular: S1 and S2, RRR, no M  Gastrointestinal: BS+, soft, NT/ND, neg HSM,  Extremities: No peripheral edema, neg clubbing, cyanosis  Vascular: 2+ peripheral pulses  Neurological: A/O x 3, no focal deficits  Psychiatric: Normal mood, normal affect  Skin: No rashes +jaundice      LABS:                        9.8    7.39  )-----------( 46       ( 04 Dec 2019 08:38 )             28.6     12-04    133<L>  |  93<L>  |  45<H>  ----------------------------<  176<H>  3.9   |  26  |  1.99<H>    Ca    10.7<H>      04 Dec 2019 07:13  Phos  2.2     12-  Mg     2.0     12-04    TPro  7.0  /  Alb  4.2  /  TBili  8.6<H>  /  DBili  2.7<H>  /  AST  31  /  ALT  19  /  AlkPhos  126<H>  12-04    PT/INR - ( 04 Dec 2019 08:45 )   PT: 23.1 sec;   INR: 1.97 ratio         PTT - ( 04 Dec 2019 08:45 )  PTT:37.7 sec  Urinalysis Basic - ( 04 Dec 2019 08:38 )    Color: Yellow / Appearance: Turbid / S.017 / pH: x  Gluc: x / Ketone: Negative  / Bili: Negative / Urobili: <2 mg/dL   Blood: x / Protein: 30 mg/dL / Nitrite: Negative   Leuk Esterase: Large / RBC: 11 /HPF /  /HPF   Sq Epi: x / Non Sq Epi: 1 /HPF / Bacteria: TNTC        RADIOLOGY & ADDITIONAL TESTS:

## 2019-12-04 NOTE — CONSULT NOTE ADULT - ASSESSMENT
63 year-old with multiple cardiovascular risk factors presents with hepatic encephalopathy.  Given hypotension requiring midodrine and body habitus that resulted in poor TTE quality on previous study, patient is not ideal candidate for dobutamine stress echo.    Left heart catheterization involves risk of bleeding in patient with low platelets and elevated INR, and additional risk of contrast induced nephropathy, however this will be the preferred test to evaluate patient with atherosclerosis of coronary vessels on CT scan and risk factors as above.    Will arrange for left heart cath. If patient remains encephalopathic, his wife will have to provide consent.

## 2019-12-04 NOTE — PROGRESS NOTE ADULT - PROBLEM SELECTOR PLAN 2
- recent EGD 5/2019 non-bleeding duodenal ulcers and No evidence of varices   - cirrhosis 2/2 NAFLD   - US Abd without ascites; CT abd without new gi pathology; no liver lesions  - continue xifaxan and lactulose as above   - Protonix 40mg PO QD   - heme eval to r/o hemolysis given increased indirect bili  - liver transplant evaluation in progress; appreciate hepatology input

## 2019-12-04 NOTE — PROGRESS NOTE ADULT - PROBLEM SELECTOR PLAN 1
possible due to prerenal azotemia ,  increase water intake , low salt ,  gfr is controlled   serum creatinine is 1.99   , approximating GFR is increased    ml/min.   There is  progression . No uremic symptoms    pos evidence of anemia .  Fluid status stable.  Will continue to avoid nephrotoxic drugs.  Patient remains asymptomatic.   Continue current therapy.  hold diuresis hold arb   catie acei.

## 2019-12-04 NOTE — PROGRESS NOTE ADULT - SUBJECTIVE AND OBJECTIVE BOX
SUBJECTIVE / OVERNIGHT EVENTS: pt denies headache, nausea,v   chest pain, shortness of breath more confused than yesterday   + bms    MEDICATIONS  (STANDING):  albumin human 25% IVPB 100 milliLiter(s) IV Intermittent every 8 hours  atorvastatin 10 milliGRAM(s) Oral at bedtime  dextrose 5%. 1000 milliLiter(s) (50 mL/Hr) IV Continuous <Continuous>  dextrose 50% Injectable 12.5 Gram(s) IV Push once  dextrose 50% Injectable 25 Gram(s) IV Push once  dextrose 50% Injectable 25 Gram(s) IV Push once  insulin glargine Injectable (LANTUS) 20 Unit(s) SubCutaneous at bedtime  insulin lispro (HumaLOG) corrective regimen sliding scale   SubCutaneous three times a day before meals  insulin lispro (HumaLOG) corrective regimen sliding scale   SubCutaneous at bedtime  lactulose Syrup 30 Gram(s) Oral every 4 hours  midodrine. 20 milliGRAM(s) Oral three times a day  pantoprazole    Tablet 40 milliGRAM(s) Oral before breakfast  rifAXIMin 550 milliGRAM(s) Oral two times a day  sucralfate 1 Gram(s) Oral two times a day  tamsulosin 0.4 milliGRAM(s) Oral at bedtime    MEDICATIONS  (PRN):  dextrose 40% Gel 15 Gram(s) Oral once PRN Blood Glucose LESS THAN 70 milliGRAM(s)/deciliter  glucagon  Injectable 1 milliGRAM(s) IntraMuscular once PRN Glucose LESS THAN 70 milligrams/deciliter    Vital Signs Last 24 Hrs  T(C): 36.9 (04 Dec 2019 20:51), Max: 37.4 (04 Dec 2019 12:54)  T(F): 98.4 (04 Dec 2019 20:51), Max: 99.4 (04 Dec 2019 12:54)  HR: 81 (04 Dec 2019 20:51) (81 - 95)  BP: 126/67 (04 Dec 2019 16:39) (118/62 - 142/65)  BP(mean): --  RR: 17 (04 Dec 2019 20:51) (17 - 18)  SpO2: 95% (04 Dec 2019 20:51) (95% - 98%)    Constitutional: No fever, fatigue  Skin: No rash.  Eyes: No recent vision problems or eye pain.  ENT: No congestion, ear pain, or sore throat.  Cardiovascular: No chest pain or palpation.  Respiratory: No cough, shortness of breath, congestion, or wheezing.  Gastrointestinal: No abdominal pain, nausea, vomiting, or diarrhea.  Genitourinary: No dysuria.  Musculoskeletal: No joint swelling.  Neurologic: No headache.    PHYSICAL EXAM:  GENERAL: NAD  EYES: EOMI, PERRLA  NECK: Supple, No JVD  CHEST/LUNG: dec breath sounds at bases   HEART:  S1 , S2 +  ABDOMEN: soft , bs+, mild distension + , no tenderness  EXTREMITIES:  no edema  NEUROLOGY:alert awake oriented   flap tremor+    LABS:      133<L>  |  92<L>  |  39<H>  ----------------------------<  132<H>  3.7   |  26  |  1.74<H>    Ca    10.5      04 Dec 2019 18:43  Phos  2.2       Mg     2.0         TPro  6.6  /  Alb  4.0  /  TBili  8.1<H>  /  DBili      /  AST  27  /  ALT  18  /  AlkPhos  111      Creatinine Trend: 1.74 <--, 1.99 <--, 1.96 <--, 2.22 <--, 2.49 <--, 2.47 <--, 2.50 <--, 2.29 <--, 2.31 <--, 2.55 <--, 2.59 <--, 2.70 <--                        9.1    8.40  )-----------( 36       ( 04 Dec 2019 18:43 )             25.9     Urine Studies:  Urinalysis Basic - ( 04 Dec 2019 16:50 )    Color: Yellow / Appearance: Slightly Turbid / S.018 / pH:   Gluc:  / Ketone: Negative  / Bili: Negative / Urobili: Negative   Blood:  / Protein: 100 / Nitrite: Negative   Leuk Esterase: Large / RBC: 95 /hpf / WBC 93 /HPF   Sq Epi:  / Non Sq Epi: 0 /hpf / Bacteria: Many      Creatinine, Random Urine: 89 mg/dL ( @ 20:18)  Protein/Creatinine Ratio Calculation: 0.6 Ratio ( @ 20:18)  Sodium, Random Urine: <35 mmol/L ( @ 15:18)  Creatinine, Random Urine: 99 mg/dL ( @ 15:18)          LIVER FUNCTIONS - ( 04 Dec 2019 18:43 )  Alb: 4.0 g/dL / Pro: 6.6 g/dL / ALK PHOS: 111 U/L / ALT: 18 U/L / AST: 27 U/L / GGT: x           PT/INR - ( 04 Dec 2019 08:45 )   PT: 23.1 sec;   INR: 1.97 ratio         PTT - ( 04 Dec 2019 08:45 )  PTT:37.7 sec  Osmolality, Random Urine: 335 mosm/Kg ( @ 00:20)  Creatinine, Random Urine: 67 mg/dL ( @ 23:21)  Creatinine, Random Urine: 68 mg/dL ( @ 21:19)  Protein/Creatinine Ratio Calculation: 0.2 Ratio ( @ 21:19)  Potassium, Random Urine: 37 mmol/L ( @ 21:19)  Sodium, Random Urine: 61 mmol/L ( 21:19)          LIVER FUNCTIONS - ( 01 Dec 2019 06:13 )  Alb: 4.0 g/dL / Pro: 6.9 g/dL / ALK PHOS: 123 U/L / ALT: 21 U/L / AST: 35 U/L / GGT: x           PT/INR - ( 01 Dec 2019 06:13 )   PT: 22.5 sec;   INR: 1.92 ratio         PTT - ( 2019 07:09 )  PTT:41.6 sec  Osmolality, Random Urine: 335 mosm/Kg ( @ 00:20)  Creatinine, Random Urine: 67 mg/dL ( 23:21)  Creatinine, Random Urine: 68 mg/dL ( @ 21:19)  Protein/Creatinine Ratio Calculation: 0.2 Ratio ( 21:19)  Potassium, Random Urine: 37 mmol/L ( @ 21:19)  Sodium, Random Urine: 61 mmol/L ( @ 21:19)          LIVER FUNCTIONS - ( 2019 07:09 )  Alb: 4.0 g/dL / Pro: 6.7 g/dL / ALK PHOS: 121 U/L / ALT: 21 U/L / AST: 31 U/L / GGT: x           PT/INR - ( 2019 07:09 )   PT: 23.5 sec;   INR: 2.02 ratio         PTT - ( 2019 07:09 )  PTT:41.6 sec       PTT - ( 2019 08:22 )  PTT:37.6 sec  Color: Yellow / Appearance: Slightly Turbid / S.013 / pH:   Gluc:  / Ketone: Negative  / Bili: Negative / Urobili: <2 mg/dL   Blood:  / Protein: Trace / Nitrite: Negative   Leuk Esterase: Negative / RBC: 100 /HPF / WBC 4 /HPF   Sq Epi:  / Non Sq Epi: 0 /HPF / Bacteria: Negative      Osmolality, Random Urine: 335 mosm/Kg ( @ 00:20)  Creatinine, Random Urine: 67 mg/dL ( @ 23:21)  Creatinine, Random Urine: 68 mg/dL ( @ 21:19)  Protein/Creatinine Ratio Calculation: 0.2 Ratio ( @ 21:19)  Potassium, Random Urine: 37 mmol/L ( @ 21:19)  Sodium, Random Urine: 61 mmol/L ( @ 21:19)          LIVER FUNCTIONS - ( 2019 06:29 )  Alb: 3.0 g/dL / Pro: 6.9 g/dL / ALK PHOS: 182 U/L / ALT: 23 U/L / AST: 36 U/L / GGT: x           PT/INR - ( 2019 08:38 )   PT: 21.8 sec;   INR: 1.88 ratio         PTT - ( 2019 08:38 )  PTT:36.8 sec  Blood: x / Protein: Negative / Nitrite: Negative   Leuk Esterase: Negative / RBC: 14 /HPF / WBC 4 /HPF   Sq Epi: x / Non Sq Epi: 0 /HPF / Bacteria: Negative        RADIOLOGY & ADDITIONAL TESTS:    Imaging Personally Reviewed:    Consultant(s) Notes Reviewed:      Care Discussed with Consultants/Other Providers:

## 2019-12-04 NOTE — PROGRESS NOTE ADULT - SUBJECTIVE AND OBJECTIVE BOX
Chief Complaint:  Patient is a 63y old  Male who presents with a chief complaint of difficulty to ambulate/ weakness (03 Dec 2019 17:13)      Interval Events:     Allergies:  codeine (Anaphylaxis)  NO  RED MEAT (Unknown)      Hospital Medications:  atorvastatin 10 milliGRAM(s) Oral at bedtime  dextrose 40% Gel 15 Gram(s) Oral once PRN  dextrose 5%. 1000 milliLiter(s) IV Continuous <Continuous>  dextrose 50% Injectable 12.5 Gram(s) IV Push once  dextrose 50% Injectable 25 Gram(s) IV Push once  dextrose 50% Injectable 25 Gram(s) IV Push once  glucagon  Injectable 1 milliGRAM(s) IntraMuscular once PRN  insulin glargine Injectable (LANTUS) 20 Unit(s) SubCutaneous at bedtime  insulin lispro (HumaLOG) corrective regimen sliding scale   SubCutaneous three times a day before meals  insulin lispro (HumaLOG) corrective regimen sliding scale   SubCutaneous at bedtime  lactulose Syrup 30 Gram(s) Oral every 4 hours  midodrine. 20 milliGRAM(s) Oral three times a day  pantoprazole    Tablet 40 milliGRAM(s) Oral before breakfast  rifAXIMin 550 milliGRAM(s) Oral two times a day  sucralfate 1 Gram(s) Oral two times a day  tamsulosin 0.4 milliGRAM(s) Oral at bedtime      PMHX/PSHX:  GIB (gastrointestinal bleeding)  GERD with esophagitis  Hepatic encephalopathy  Obesity  Fatty liver disease, nonalcoholic  Renal stones  Hypertension  Neuropathy  Hypercholesteremia  Diabetes  S/P cholecystectomy  No significant past surgical history      Family history:  Family history of type 2 diabetes mellitus  Family history of hypertension  Family history of stomach cancer  No pertinent family history in first degree relatives      ROS:     General:  No wt loss, fevers, chills, night sweats, fatigue,   Eyes:  Good vision, no reported pain  ENT:  No sore throat, pain, runny nose, dysphagia  CV:  No pain, palpitations, hypo/hypertension  Resp:  No dyspnea, cough, tachypnea, wheezing  GI:  See HPI  :  No pain, bleeding, incontinence, nocturia  Muscle:  No pain, weakness  Neuro:  No weakness, tingling, memory problems  Psych:  No fatigue, insomnia, mood problems, depression  Endocrine:  No polyuria, polydipsia, cold/heat intolerance  Heme:  No petechiae, ecchymosis, easy bruisability  Skin:  No rash, edema      PHYSICAL EXAM:     GENERAL:  Appears stated age, well-groomed, well-nourished, no distress  HEENT:  NC/AT,  conjunctivae clear, sclera -anicteric  CHEST:  Full & symmetric excursion, no increased effort, breath sounds clear  HEART:  Regular rhythm, S1, S2, no murmur/rub/S3/S4,  no edema  ABDOMEN:  Soft, non-tender, non-distended, normoactive bowel sounds,  no masses ,no hepato-splenomegaly,   EXTREMITIES:  no cyanosis,clubbing or edema  SKIN:  No rash/erythema/ecchymoses/petechiae/wounds/abscess/warm/dry  NEURO:  Alert, oriented    Vital Signs:  Vital Signs Last 24 Hrs  T(C): 37.3 (04 Dec 2019 04:55), Max: 37.3 (04 Dec 2019 04:55)  T(F): 99.1 (04 Dec 2019 04:55), Max: 99.1 (04 Dec 2019 04:55)  HR: 93 (04 Dec 2019 04:55) (78 - 93)  BP: 142/65 (04 Dec 2019 04:55) (120/66 - 142/65)  BP(mean): --  RR: 18 (04 Dec 2019 04:55) (17 - 18)  SpO2: 98% (04 Dec 2019 04:55) (94% - 98%)  Daily     Daily Weight in k.6 (03 Dec 2019 09:22)    LABS:                        9.6    6.73  )-----------( 49       ( 03 Dec 2019 08:56 )             27.1     12-    133<L>  |  93<L>  |  45<H>  ----------------------------<  176<H>  3.9   |  26  |  1.99<H>    Ca    10.7<H>      04 Dec 2019 07:13  Phos  2.2     12-  Mg     2.0     12-    TPro  7.0  /  Alb  4.2  /  TBili  8.6<H>  /  DBili  2.7<H>  /  AST  31  /  ALT  19  /  AlkPhos  126<H>  12-04    LIVER FUNCTIONS - ( 04 Dec 2019 07:13 )  Alb: 4.2 g/dL / Pro: 7.0 g/dL / ALK PHOS: 126 U/L / ALT: 19 U/L / AST: 31 U/L / GGT: x           PT/INR - ( 03 Dec 2019 08:53 )   PT: 21.9 sec;   INR: 1.90 ratio             Amylase Serum--      Lipase serum--       [id number]      Imaging: Chief Complaint:  Patient is a 63y old  Male who presents with a chief complaint of difficulty to ambulate/ weakness (03 Dec 2019 17:13)      Interval Events: Patient more confused today.  Had 7 bowel movements overnight and this morning.  High indirect bilirubin.    Allergies:  codeine (Anaphylaxis)  NO  RED MEAT (Unknown)      Hospital Medications:  atorvastatin 10 milliGRAM(s) Oral at bedtime  dextrose 40% Gel 15 Gram(s) Oral once PRN  dextrose 5%. 1000 milliLiter(s) IV Continuous <Continuous>  dextrose 50% Injectable 12.5 Gram(s) IV Push once  dextrose 50% Injectable 25 Gram(s) IV Push once  dextrose 50% Injectable 25 Gram(s) IV Push once  glucagon  Injectable 1 milliGRAM(s) IntraMuscular once PRN  insulin glargine Injectable (LANTUS) 20 Unit(s) SubCutaneous at bedtime  insulin lispro (HumaLOG) corrective regimen sliding scale   SubCutaneous three times a day before meals  insulin lispro (HumaLOG) corrective regimen sliding scale   SubCutaneous at bedtime  lactulose Syrup 30 Gram(s) Oral every 4 hours  midodrine. 20 milliGRAM(s) Oral three times a day  pantoprazole    Tablet 40 milliGRAM(s) Oral before breakfast  rifAXIMin 550 milliGRAM(s) Oral two times a day  sucralfate 1 Gram(s) Oral two times a day  tamsulosin 0.4 milliGRAM(s) Oral at bedtime      PMHX/PSHX:  GIB (gastrointestinal bleeding)  GERD with esophagitis  Hepatic encephalopathy  Obesity  Fatty liver disease, nonalcoholic  Renal stones  Hypertension  Neuropathy  Hypercholesteremia  Diabetes  S/P cholecystectomy  No significant past surgical history      Family history:  Family history of type 2 diabetes mellitus  Family history of hypertension  Family history of stomach cancer  No pertinent family history in first degree relatives      ROS:     General:  No wt loss, fevers, chills, night sweats, fatigue,   Eyes:  Good vision, no reported pain  ENT:  No sore throat, pain, runny nose, dysphagia  CV:  No pain, palpitations, hypo/hypertension  Resp:  No dyspnea, cough, tachypnea, wheezing  GI:  See HPI  :  No pain, bleeding, incontinence, nocturia  Muscle:  No pain, weakness  Neuro:  No weakness, tingling, memory problems  Psych:  No fatigue, insomnia, mood problems, depression  Endocrine:  No polyuria, polydipsia, cold/heat intolerance  Heme:  No petechiae, ecchymosis, easy bruisability  Skin:  No rash, edema      PHYSICAL EXAM:     GENERAL: NAD  HEENT:  NC/AT  CHEST:  Full & symmetric excursion, no increased effort  ABDOMEN:  Soft, non-tender, non-distended, +BS  EXTREMITIES:  no edema  SKIN:  No rash  NEURO: Confused      Vital Signs:  Vital Signs Last 24 Hrs  T(C): 37.3 (04 Dec 2019 04:55), Max: 37.3 (04 Dec 2019 04:55)  T(F): 99.1 (04 Dec 2019 04:55), Max: 99.1 (04 Dec 2019 04:55)  HR: 93 (04 Dec 2019 04:55) (78 - 93)  BP: 142/65 (04 Dec 2019 04:55) (120/66 - 142/65)  BP(mean): --  RR: 18 (04 Dec 2019 04:55) (17 - 18)  SpO2: 98% (04 Dec 2019 04:55) (94% - 98%)  Daily     Daily Weight in k.6 (03 Dec 2019 09:22)    LABS:                        9.6    6.73  )-----------( 49       ( 03 Dec 2019 08:56 )             27.1     12-    133<L>  |  93<L>  |  45<H>  ----------------------------<  176<H>  3.9   |  26  |  1.99<H>    Ca    10.7<H>      04 Dec 2019 07:13  Phos  2.2     12-  Mg     2.0     12    TPro  7.0  /  Alb  4.2  /  TBili  8.6<H>  /  DBili  2.7<H>  /  AST  31  /  ALT  19  /  AlkPhos  126<H>  12-    LIVER FUNCTIONS - ( 04 Dec 2019 07:13 )  Alb: 4.2 g/dL / Pro: 7.0 g/dL / ALK PHOS: 126 U/L / ALT: 19 U/L / AST: 31 U/L / GGT: x           PT/INR - ( 03 Dec 2019 08:53 )   PT: 21.9 sec;   INR: 1.90 ratio             Amylase Serum--      Lipase serum--       [id number]      Imaging:  reviewed

## 2019-12-04 NOTE — PROGRESS NOTE ADULT - ATTENDING COMMENTS
Hepatology Staff: Yomi Medina MD    I saw and examined the patient along with Dr. Huang on 12/4/2019.  Patient Medical Record, hospital course was reviewed and summarized as below:  Patient with history of JEAN cirrhosis currently admitted with hepatic encephalopathy, and MISA. Improving MISA. Mental status waxing/waning. AAOX 1 today, Had multiple BMs this am. Electrolytes OK.    Hemodynamic Status:  Vitals: Vital Signs Last 24 Hrs  T(C): 37.4 (04 Dec 2019 12:54), Max: 37.4 (04 Dec 2019 12:54)  T(F): 99.4 (04 Dec 2019 12:54), Max: 99.4 (04 Dec 2019 12:54)  HR: 95 (04 Dec 2019 12:54) (78 - 95)  BP: 118/62 (04 Dec 2019 12:54) (118/62 - 142/65)  BP(mean): --  RR: 18 (04 Dec 2019 12:54) (17 - 18)  SpO2: 98% (04 Dec 2019 12:54) (96% - 98%)  Medications:  IV Fluids: IV Albumin 25% 25 g q 8hrs.  Antibiotics: None  Diuretics: Held  Labs: INR: 1.97 ratio (12-04-19 @ 08:45); Bilirubin Total, Serum: 8.6 mg/dL (12-04-19 @ 07:13);Creatinine, Serum: 1.99 mg/dL (12.04.19 @ 07:13)    I/O: I&O's Summary    03 Dec 2019 07:01  -  04 Dec 2019 07:00  --------------------------------------------------------  IN: 830 mL / OUT: 650 mL / NET: 180 mL    04 Dec 2019 07:01  -  04 Dec 2019 16:09  --------------------------------------------------------  IN: 240 mL / OUT: 1 mL / NET: 239 mL      Nutritional Status: Obese  Last 24 hour events:  Patient is more encephalopathic today    Recommendations: Cont with Lactulose and Rifaximine. Transplant evaluation pending insurance approval. Would recommend MRI with and without contrast of the abdomen. Cardiology consults for clearance for liver transplant ( will need DSE +/- cath onec renal function improves)    Plan discussed with Primary team.

## 2019-12-04 NOTE — PROGRESS NOTE ADULT - ASSESSMENT
63 year old male with HTN, DM2, non-alcoholic cirrhosis, who presents with altered mental status. We last saw him during a hospitalization in 10/2019, during which he was significantly volume overloaded and required iv diuresis.  He was initially confused though his mental status has improved. He remains with hyponatremia and high ammonia level.    Abnormal EKG  - EKG is notable for prolonged qtc which was still present on 11/30  - avoid qtc prolonging medications  - replete K  - check daily mg as well  - Monitor and replete electrolytes. Keep K>4.0 and Mg>2.0.   - check ekg today please    Diastolic HF   - no sign of acute ischemia  - no significant volume overload on exam. CT without pleural effusions or edema, though notable for possible chest wall hematoma.  - can hold diuretics in the short term   - GI and renal follow up.  - TTE normal LV systolic function EF 65%; LVH, DD Grade I    HTN  - BP on softer side  - hold diuretics and anti-hypertensives  - when able start BB    - cont statin for cad/atherosclerosis noted on ct  - lfts remain within normal limits    - Watch creatinine and electrolytes. Keep K>4, mg>2  - All other workup as per primary team  - Will follow with you. Further cardiac w/u as indicated by clinical course

## 2019-12-04 NOTE — PROGRESS NOTE ADULT - ASSESSMENT
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    #Hepatic encephalopathy, persistent.  No signs of infection, clot.  ?Home med compliance  #Presumed JEAN cirrhosis, MELD-Na 32 on 12/3      - varices: none on EGD outside in 10/2019      - HE: present, on lactulose and Xifaxan but not responding to therapy      - ascites: none currently, but history of ascites with SBP      - HCC: none on US 11/27, AFP 23 in 10/19      - Vascular assessment: poor US doppler exam for eval of vessels due to body habitus       - Liver Workup: HAV/HBV/HCV immunostudies neg, HIV neg, iron studies continue with AoCD      - Age appropriate screening: colonoscopy 5-6 months ago per patient      - Dental evaluation: >1 year ago, no loose teeth per patient      - Cardiac evaluation: poor study due to habitus but EF 65%, mild pHTN on TTE 10/25/19      - Blood type A+, Ab neg      - Previous transplant workup: none      - Social: retired, previous , lives in [place] with wife and son, previously highly functional (before October), Insurance -Medicare HMO  # MISA, Cr improved today, Concepcion <35 on labs continue with pre-renal  # Chest wall hematoma  # T2DM  # Hypertension (currently normotensive)    Recs:  - would consider hematology consultation given indirect hyperbilirubinemia to evaluate for hemolysis, also abnormal SPEP  - please obtain 24 hour urine copper given low ceruloplasmin  - would obtain MRI abdomen with and without contrast to evaluate liver parenchyma when the patient is mentating better  - followup cardiology recs re clearance with DSE for possible transplant  - hold diuretics  - continue midodrine 20mg TID  - continue albumin TID  - continue lactulose 3-4 times daily, continue Xifaxan BID for encephalopathy  - can complete thiamine trial for possible Wernicke's though unlikely given lack of response  - would obtain last colonoscopy report (need up-to-date age appropriate screening if eligible for transplant)  - formal transplant evaluation to begin today, patient consented

## 2019-12-04 NOTE — CONSULT NOTE ADULT - SUBJECTIVE AND OBJECTIVE BOX
Patient seen and evaluated @ 9am on 4 DSU  Chief Complaint: Hepatic encephalopathy    HPI:  63M w/ pmh HTN, HLD, DM, non-alcoholic cirrhosis, chronic thrombocytopenia -- sent from rehab for worsening jaundice, dizzy/weakness/falls x 4 days. Discharged to rehab after last hospitalization. No fever, n/v/d/c, chest / abd pain, cough, sob, dysuria/hematuria. recently admitted at OSH for sepsis, hypoglycemia, cirrhosis (lactulose, xifaxan), hepatic encephalopathy, GI bleed; G+ bacteremia, EGD, last bm yesterday morning (26 Nov 2019 17:37)    PMH:   GIB (gastrointestinal bleeding)  GERD with esophagitis  Hepatic encephalopathy  Obesity  Fatty liver disease, nonalcoholic  Renal stones  Hypertension  Neuropathy  Hypercholesteremia  Diabetes    PSH:   S/P cholecystectomy  No significant past surgical history    Medications:   albumin human 25% IVPB 100 milliLiter(s) IV Intermittent every 8 hours  atorvastatin 10 milliGRAM(s) Oral at bedtime  dextrose 40% Gel 15 Gram(s) Oral once PRN  dextrose 5%. 1000 milliLiter(s) IV Continuous <Continuous>  dextrose 50% Injectable 12.5 Gram(s) IV Push once  dextrose 50% Injectable 25 Gram(s) IV Push once  dextrose 50% Injectable 25 Gram(s) IV Push once  glucagon  Injectable 1 milliGRAM(s) IntraMuscular once PRN  insulin glargine Injectable (LANTUS) 20 Unit(s) SubCutaneous at bedtime  insulin lispro (HumaLOG) corrective regimen sliding scale   SubCutaneous three times a day before meals  insulin lispro (HumaLOG) corrective regimen sliding scale   SubCutaneous at bedtime  lactulose Syrup 30 Gram(s) Oral every 4 hours  midodrine. 20 milliGRAM(s) Oral three times a day  pantoprazole    Tablet 40 milliGRAM(s) Oral before breakfast  rifAXIMin 550 milliGRAM(s) Oral two times a day  sucralfate 1 Gram(s) Oral two times a day  tamsulosin 0.4 milliGRAM(s) Oral at bedtime    Allergies:  codeine (Anaphylaxis)  NO  RED MEAT (Unknown)    FAMILY HISTORY:  Family history of type 2 diabetes mellitus  Family history of hypertension  Family history of stomach cancer    Social History: , used to work in construction  Smoking: former smoker  Alcohol: former EtOH  Drugs: denies illicit drug use    Review of Systems:  Unreliable given encephalopathy           Physical Exam:  T(C): 37.4 (12-04-19 @ 12:54), Max: 37.4 (12-04-19 @ 12:54)  HR: 90 (12-04-19 @ 16:39) (78 - 95)  BP: 126/67 (12-04-19 @ 16:39) (118/62 - 142/65)  RR: 18 (12-04-19 @ 12:54) (17 - 18)  SpO2: 97% (12-04-19 @ 16:39) (96% - 98%)  Wt(kg): --    12-03 @ 07:01  -  12-04 @ 07:00  --------------------------------------------------------  IN: 830 mL / OUT: 650 mL / NET: 180 mL    12-04 @ 07:01  -  12-04 @ 20:21  --------------------------------------------------------  IN: 590 mL / OUT: 1 mL / NET: 589 mL      Daily     Daily     Appearance: Obese, NAD  Eyes: PERRLA, EOMI, pink conjunctiva, no scleral icterus   HENT: Normal oral mucosa  Cardiovascular: RRR, S1, S2, no murmur, rub, or gallop; no edema; no JVD  Respiratory: Clear to auscultation bilaterally  Gastrointestinal: Soft, non-tender, BS+  Musculoskeletal: No clubbing or joint deformity   Neurologic: No focal weakness  Lymphatic: No lymphadenopathy  Psychiatry: AAOx1-2  Skin: No rashes, ecchymoses, or cyanosis    Cardiovascular Diagnostic Testing:  ECG: NSR with long QT    Echo: < from: TTE Echo Complete w/Doppler (10.25.19 @ 09:11) >  Summary:   1. Technically difficult and limited study due to body habitus.   2. Endocardial visualization was enhanced with intravenous echo contrast.   3. Normal global left ventricular systolic function.   4. Left ventricular ejection fraction, by visual estimation, is 65 to   70%.   5. The left atrium is normal in size.   6. Sclerotic aortic valve with decreased opening.   7. Mild thickening and calcification of the anterior and posterior   mitral valve leaflets.   8. Mild mitral annular calcification.   9. Mild to moderate ascites.  10. Estimated pulmonary artery systolic pressure is 43.0 mmHg assuming a   right atrial pressure of 8 mmHg, which is consistent with mild pulmonary   hypertension.    MD Anitra Electronically signed on 10/25/2019 at 12:55:12 PM    < end of copied text >    Interpretation of Telemetry: Patient is not on tele    Imaging: < from: CT Abdomen and Pelvis No Cont (11.26.19 @ 12:55) >  FINDINGS:    CHEST:     LUNGS AND LARGE AIRWAYS: Patent central airways. No pulmonary nodules.  PLEURA: No pleural effusion.  VESSELS: Atherosclerotic changes of the aorta and coronary arteries.  HEART: Heart size is normal. No pericardial effusion.  MEDIASTINUM AND RONNIE: No lymphadenopathy.  CHEST WALL AND LOWER NECK: A new 1.5 cm rounded high density focus within   the left chest wall (3:22) from prior imaging 10/26/2019 may represent a   subcutaneous hematoma.    ABDOMEN AND PELVIS:    LIVER: Cirrhosis. Limited evaluation for focal lesion given lack of   intravenous contrast.  BILE DUCTS: Normal caliber.  GALLBLADDER: Within normal limits.  SPLEEN: Within normal limits.  PANCREAS: Within normal limits.  ADRENALS: Within normal limits.  KIDNEYS/URETERS: A 4 mm nonobstructing left renal calculus.    BLADDER: Within normal limits.  REPRODUCTIVE ORGANS: Prostate within normal limits.    BOWEL: No bowel obstruction. Appendix is normal.  PERITONEUM: No ascites.  VESSELS: Atherosclerotic changes.Upper abdominal varices.  RETROPERITONEUM/LYMPH NODES: No lymphadenopathy.    ABDOMINAL WALL: Within normal limits.  BONES: Degenerative changes.    IMPRESSION:     A new 1.5 cm rounded high density focus within the left chest wall from   prior imaging 10/26/2019 may represent a subcutaneous hematoma.   Otherwise, no evidence of acute traumatic injury.    Cirrhosis. No ascites.    RICHARD PUCKETT M.D., ATTENDING RADIOLOGIST  This document has been electronically signed. Nov26 2019  1:19PM    < end of copied text >      Labs:                        9.1    8.40  )-----------( 36       ( 04 Dec 2019 18:43 )             25.9     12-04    133<L>  |  92<L>  |  39<H>  ----------------------------<  132<H>  3.7   |  26  |  1.74<H>    Ca    10.5      04 Dec 2019 18:43  Phos  2.2     12-04  Mg     2.0     12-04    TPro  6.6  /  Alb  4.0  /  TBili  8.1<H>  /  DBili  x   /  AST  27  /  ALT  18  /  AlkPhos  111  12-04    PT/INR - ( 04 Dec 2019 08:45 )   PT: 23.1 sec;   INR: 1.97 ratio         PTT - ( 04 Dec 2019 08:45 )  PTT:37.7 sec          Hemoglobin A1C, Whole Blood: 5.8 % (12-04 @ 08:38)  Hemoglobin A1C, Whole Blood: 5.7 % (12-03 @ 08:56)    Thyroid Stimulating Hormone, Serum: 1.72 uIU/mL (12-03 @ 09:10)

## 2019-12-05 LAB
% GAMMA, URINE: 16.9 % — SIGNIFICANT CHANGE UP
ALBUMIN 24H MFR UR ELPH: 51.2 % — SIGNIFICANT CHANGE UP
ALBUMIN SERPL ELPH-MCNC: 4 G/DL — SIGNIFICANT CHANGE UP (ref 3.3–5)
ALP SERPL-CCNC: 107 U/L — SIGNIFICANT CHANGE UP (ref 40–120)
ALPHA1 GLOB 24H MFR UR ELPH: 7.2 % — SIGNIFICANT CHANGE UP
ALPHA2 GLOB 24H MFR UR ELPH: 8.4 % — SIGNIFICANT CHANGE UP
ALT FLD-CCNC: 18 U/L — SIGNIFICANT CHANGE UP (ref 10–45)
ANA TITR SER: NEGATIVE — SIGNIFICANT CHANGE UP
ANION GAP SERPL CALC-SCNC: 16 MMOL/L — SIGNIFICANT CHANGE UP (ref 5–17)
AST SERPL-CCNC: 28 U/L — SIGNIFICANT CHANGE UP (ref 10–40)
B-GLOBULIN 24H MFR UR ELPH: 16.3 % — SIGNIFICANT CHANGE UP
B2 GLYCOPROT1 AB SER QL: NEGATIVE — SIGNIFICANT CHANGE UP
BILIRUB SERPL-MCNC: 9 MG/DL — HIGH (ref 0.2–1.2)
BUN SERPL-MCNC: 39 MG/DL — HIGH (ref 7–23)
CALCIUM SERPL-MCNC: 10.7 MG/DL — HIGH (ref 8.4–10.5)
CHLORIDE SERPL-SCNC: 94 MMOL/L — LOW (ref 96–108)
CMV IGG FLD QL: <0.2 U/ML — SIGNIFICANT CHANGE UP
CMV IGG SERPL-IMP: NEGATIVE — SIGNIFICANT CHANGE UP
CO2 SERPL-SCNC: 24 MMOL/L — SIGNIFICANT CHANGE UP (ref 22–31)
COLLECT DURATION TIME UR: 24 HR — SIGNIFICANT CHANGE UP
CREAT ?TM UR-MCNC: 118 MG/DL — SIGNIFICANT CHANGE UP
CREAT SERPL-MCNC: 1.7 MG/DL — HIGH (ref 0.5–1.3)
CREATININE, URINE RESULT: 101 MG/DL — SIGNIFICANT CHANGE UP
EBV EA AB SER IA-ACNC: <5 U/ML — SIGNIFICANT CHANGE UP
EBV EA AB TITR SER IF: POSITIVE
EBV EA IGG SER-ACNC: NEGATIVE — SIGNIFICANT CHANGE UP
EBV NA IGG SER IA-ACNC: >600 U/ML — HIGH
EBV PATRN SPEC IB-IMP: SIGNIFICANT CHANGE UP
EBV VCA IGG AVIDITY SER QL IA: POSITIVE
EBV VCA IGM SER IA-ACNC: <10 U/ML — SIGNIFICANT CHANGE UP
EBV VCA IGM SER IA-ACNC: >750 U/ML — HIGH
EBV VCA IGM TITR FLD: NEGATIVE — SIGNIFICANT CHANGE UP
GAMMA INTERFERON BACKGROUND BLD IA-ACNC: 0.03 IU/ML — SIGNIFICANT CHANGE UP
GLUCOSE BLDC GLUCOMTR-MCNC: 132 MG/DL — HIGH (ref 70–99)
GLUCOSE BLDC GLUCOMTR-MCNC: 176 MG/DL — HIGH (ref 70–99)
GLUCOSE BLDC GLUCOMTR-MCNC: 200 MG/DL — HIGH (ref 70–99)
GLUCOSE BLDC GLUCOMTR-MCNC: 216 MG/DL — HIGH (ref 70–99)
GLUCOSE BLDC GLUCOMTR-MCNC: 259 MG/DL — HIGH (ref 70–99)
GLUCOSE SERPL-MCNC: 131 MG/DL — HIGH (ref 70–99)
HCT VFR BLD CALC: 27.2 % — LOW (ref 39–50)
HGB BLD-MCNC: 9.6 G/DL — LOW (ref 13–17)
HSV1 IGG SER-ACNC: 37.2 INDEX — HIGH
HSV1 IGG SERPL QL IA: POSITIVE
HSV2 IGG FLD-ACNC: 0.2 INDEX — SIGNIFICANT CHANGE UP
HSV2 IGG SERPL QL IA: NEGATIVE — SIGNIFICANT CHANGE UP
INR BLD: 2.1 RATIO — HIGH (ref 0.88–1.16)
INTERPRETATION 24H UR IFE-IMP: SIGNIFICANT CHANGE UP
INTERPRETATION 24H UR IFE-IMP: SIGNIFICANT CHANGE UP
M PROTEIN 24H UR ELPH-MRATE: 0 MG/24HR — SIGNIFICANT CHANGE UP (ref 0–0)
M PROTEIN 24H UR ELPH-MRATE: 0 MG/DL — SIGNIFICANT CHANGE UP
M TB IFN-G BLD-IMP: NEGATIVE — SIGNIFICANT CHANGE UP
M TB IFN-G CD4+ BCKGRND COR BLD-ACNC: 0 IU/ML — SIGNIFICANT CHANGE UP
M TB IFN-G CD4+CD8+ BCKGRND COR BLD-ACNC: 0 IU/ML — SIGNIFICANT CHANGE UP
MCHC RBC-ENTMCNC: 34.2 PG — HIGH (ref 27–34)
MCHC RBC-ENTMCNC: 35.3 GM/DL — SIGNIFICANT CHANGE UP (ref 32–36)
MCV RBC AUTO: 96.8 FL — SIGNIFICANT CHANGE UP (ref 80–100)
NRBC # BLD: 0 /100 WBCS — SIGNIFICANT CHANGE UP (ref 0–0)
PLATELET # BLD AUTO: 47 K/UL — LOW (ref 150–400)
POTASSIUM SERPL-MCNC: 4 MMOL/L — SIGNIFICANT CHANGE UP (ref 3.5–5.3)
POTASSIUM SERPL-SCNC: 4 MMOL/L — SIGNIFICANT CHANGE UP (ref 3.5–5.3)
PROT ?TM UR-MCNC: 108 MG/DL — HIGH (ref 0–12)
PROT ?TM UR-MCNC: 88 MG/DL — HIGH (ref 0–12)
PROT ?TM UR-MCNC: 88 MG/DL — HIGH (ref 0–12)
PROT PATTERN 24H UR ELPH-IMP: SIGNIFICANT CHANGE UP
PROT SERPL-MCNC: 6.8 G/DL — SIGNIFICANT CHANGE UP (ref 6–8.3)
PROT/CREAT UR-RTO: 0.9 RATIO — HIGH (ref 0–0.2)
PROTEIN QUANT CALC, URINE: 2112 MG/24 H — HIGH (ref 50–100)
PROTHROM AB SERPL-ACNC: 24.7 SEC — HIGH (ref 10–12.9)
QUANT TB PLUS MITOGEN MINUS NIL: 0.76 IU/ML — SIGNIFICANT CHANGE UP
RBC # BLD: 2.81 M/UL — LOW (ref 4.2–5.8)
RBC # FLD: 18.7 % — HIGH (ref 10.3–14.5)
RUBV IGG SER-ACNC: 30 INDEX — SIGNIFICANT CHANGE UP
RUBV IGG SER-IMP: POSITIVE — SIGNIFICANT CHANGE UP
SODIUM SERPL-SCNC: 134 MMOL/L — LOW (ref 135–145)
T GONDII IGG SER QL: <3 IU/ML — SIGNIFICANT CHANGE UP
T GONDII IGG SER QL: NEGATIVE — SIGNIFICANT CHANGE UP
TOTAL VOLUME - 24 HOUR: 2400 ML — SIGNIFICANT CHANGE UP
URINE CREATININE CALCULATION: 2.4 G/24 H — HIGH (ref 1–2)
VZV IGG FLD QL IA: 2083 INDEX — SIGNIFICANT CHANGE UP
VZV IGG FLD QL IA: POSITIVE — SIGNIFICANT CHANGE UP
WBC # BLD: 7.21 K/UL — SIGNIFICANT CHANGE UP (ref 3.8–10.5)
WBC # FLD AUTO: 7.21 K/UL — SIGNIFICANT CHANGE UP (ref 3.8–10.5)

## 2019-12-05 PROCEDURE — ZZZZZ: CPT

## 2019-12-05 PROCEDURE — 99232 SBSQ HOSP IP/OBS MODERATE 35: CPT

## 2019-12-05 PROCEDURE — 99233 SBSQ HOSP IP/OBS HIGH 50: CPT

## 2019-12-05 PROCEDURE — 93460 R&L HRT ART/VENTRICLE ANGIO: CPT | Mod: 26

## 2019-12-05 RX ORDER — ALBUMIN HUMAN 25 %
100 VIAL (ML) INTRAVENOUS EVERY 8 HOURS
Refills: 0 | Status: COMPLETED | OUTPATIENT
Start: 2019-12-05 | End: 2019-12-06

## 2019-12-05 RX ORDER — CEFTRIAXONE 500 MG/1
1000 INJECTION, POWDER, FOR SOLUTION INTRAMUSCULAR; INTRAVENOUS EVERY 24 HOURS
Refills: 0 | Status: COMPLETED | OUTPATIENT
Start: 2019-12-05 | End: 2019-12-09

## 2019-12-05 RX ADMIN — Medication 50 MILLILITER(S): at 06:08

## 2019-12-05 RX ADMIN — PANTOPRAZOLE SODIUM 40 MILLIGRAM(S): 20 TABLET, DELAYED RELEASE ORAL at 06:08

## 2019-12-05 RX ADMIN — Medication 50 MILLILITER(S): at 20:57

## 2019-12-05 RX ADMIN — MIDODRINE HYDROCHLORIDE 20 MILLIGRAM(S): 2.5 TABLET ORAL at 19:28

## 2019-12-05 RX ADMIN — Medication 1: at 13:32

## 2019-12-05 RX ADMIN — LACTULOSE 30 GRAM(S): 10 SOLUTION ORAL at 10:07

## 2019-12-05 RX ADMIN — TAMSULOSIN HYDROCHLORIDE 0.4 MILLIGRAM(S): 0.4 CAPSULE ORAL at 22:37

## 2019-12-05 RX ADMIN — INSULIN GLARGINE 20 UNIT(S): 100 INJECTION, SOLUTION SUBCUTANEOUS at 22:37

## 2019-12-05 RX ADMIN — ATORVASTATIN CALCIUM 10 MILLIGRAM(S): 80 TABLET, FILM COATED ORAL at 22:37

## 2019-12-05 RX ADMIN — MIDODRINE HYDROCHLORIDE 20 MILLIGRAM(S): 2.5 TABLET ORAL at 13:17

## 2019-12-05 RX ADMIN — Medication 1 GRAM(S): at 19:29

## 2019-12-05 RX ADMIN — LACTULOSE 30 GRAM(S): 10 SOLUTION ORAL at 06:08

## 2019-12-05 RX ADMIN — MIDODRINE HYDROCHLORIDE 20 MILLIGRAM(S): 2.5 TABLET ORAL at 06:08

## 2019-12-05 RX ADMIN — Medication 3: at 19:29

## 2019-12-05 RX ADMIN — Medication 1 GRAM(S): at 06:08

## 2019-12-05 RX ADMIN — CEFTRIAXONE 100 MILLIGRAM(S): 500 INJECTION, POWDER, FOR SOLUTION INTRAMUSCULAR; INTRAVENOUS at 20:20

## 2019-12-05 RX ADMIN — LACTULOSE 30 GRAM(S): 10 SOLUTION ORAL at 13:25

## 2019-12-05 RX ADMIN — LACTULOSE 30 GRAM(S): 10 SOLUTION ORAL at 19:32

## 2019-12-05 NOTE — PROGRESS NOTE ADULT - SUBJECTIVE AND OBJECTIVE BOX
INTERVAL HPI/OVERNIGHT EVENTS:    sitting in chair; confused this morning   had 2 bms    MEDICATIONS  (STANDING):  atorvastatin 10 milliGRAM(s) Oral at bedtime  dextrose 5%. 1000 milliLiter(s) (50 mL/Hr) IV Continuous <Continuous>  dextrose 50% Injectable 12.5 Gram(s) IV Push once  dextrose 50% Injectable 25 Gram(s) IV Push once  dextrose 50% Injectable 25 Gram(s) IV Push once  insulin glargine Injectable (LANTUS) 20 Unit(s) SubCutaneous at bedtime  insulin lispro (HumaLOG) corrective regimen sliding scale   SubCutaneous three times a day before meals  insulin lispro (HumaLOG) corrective regimen sliding scale   SubCutaneous at bedtime  lactulose Syrup 30 Gram(s) Oral every 4 hours  midodrine. 20 milliGRAM(s) Oral three times a day  pantoprazole    Tablet 40 milliGRAM(s) Oral before breakfast  rifAXIMin 550 milliGRAM(s) Oral two times a day  sucralfate 1 Gram(s) Oral two times a day  tamsulosin 0.4 milliGRAM(s) Oral at bedtime    MEDICATIONS  (PRN):  dextrose 40% Gel 15 Gram(s) Oral once PRN Blood Glucose LESS THAN 70 milliGRAM(s)/deciliter  glucagon  Injectable 1 milliGRAM(s) IntraMuscular once PRN Glucose LESS THAN 70 milligrams/deciliter      Allergies    codeine (Anaphylaxis)    Intolerances    NO  RED MEAT (Unknown)      Review of Systems: *confused        Vital Signs Last 24 Hrs  T(C): 37 (05 Dec 2019 05:41), Max: 37 (05 Dec 2019 05:41)  T(F): 98.6 (05 Dec 2019 05:41), Max: 98.6 (05 Dec 2019 05:41)  HR: 83 (05 Dec 2019 05:41) (81 - 90)  BP: 115/61 (05 Dec 2019 05:41) (115/61 - 126/67)  BP(mean): --  RR: 18 (05 Dec 2019 05:41) (17 - 18)  SpO2: 97% (05 Dec 2019 05:41) (95% - 97%)    PHYSICAL EXAM:    Constitutional: NAD  HEENT: EOMI, throat clear  Neck: No LAD, supple  Respiratory: CTA and P  Cardiovascular: S1 and S2, RRR, no M  Gastrointestinal: BS+, soft, NT/ND, neg HSM,  Extremities: No peripheral edema, neg clubbing, cyanosis  Vascular: 2+ peripheral pulses  Neurological: A/O x 1, no focal deficits  Psychiatric: Normal mood, normal affect  Skin: No rashes      LABS:                        9.6    7.21  )-----------( 47       ( 05 Dec 2019 06:59 )             27.2     12-05    134<L>  |  94<L>  |  39<H>  ----------------------------<  131<H>  4.0   |  24  |  1.70<H>    Ca    10.7<H>      05 Dec 2019 06:58  Phos  2.2     12-  Mg     2.0     12-    TPro  6.8  /  Alb  4.0  /  TBili  9.0<H>  /  DBili  x   /  AST  28  /  ALT  18  /  AlkPhos  107  12-05    PT/INR - ( 05 Dec 2019 07:03 )   PT: 24.7 sec;   INR: 2.10 ratio         PTT - ( 04 Dec 2019 08:45 )  PTT:37.7 sec  Urinalysis Basic - ( 04 Dec 2019 16:50 )    Color: Yellow / Appearance: Slightly Turbid / S.018 / pH: x  Gluc: x / Ketone: Negative  / Bili: Negative / Urobili: Negative   Blood: x / Protein: 100 / Nitrite: Negative   Leuk Esterase: Large / RBC: 95 /hpf / WBC 93 /HPF   Sq Epi: x / Non Sq Epi: 0 /hpf / Bacteria: Many        RADIOLOGY & ADDITIONAL TESTS:

## 2019-12-05 NOTE — PROGRESS NOTE ADULT - ASSESSMENT
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    #Hepatic encephalopathy, persistent, worsened over last 2 days, etiology? possible Urinary tract infection?  #Presumed JEAN cirrhosis, MELD-Na 12/5 23 (improving Cr)      - varices: none on EGD outside in 10/2019      - HE: present, on lactulose and Xifaxan but not responding to therapy      - ascites: none currently, but history of ascites with SBP      - HCC: none on US 11/27, AFP 23 in 10/19      - Vascular assessment: poor US doppler exam for eval of vessels due to body habitus       - Liver Workup: HAV immune, HBV non-immune, HCV neg, HIV neg, iron studies consistent wiith AoCD, RPR neg, QG neg, CMV neg, EBV IgG pos only, HSV1 pos, ASMA neg, AMA neg, ceruloplasmin LOW 12      - Age appropriate screening: colonoscopy 5-6 months ago per patient      - Dental evaluation: >1 year ago, no loose teeth per patient      - Cardiac evaluation: poor study due to habitus but EF 65%, mild pHTN on TTE 10/25/19      - Blood type A+, Ab neg      - Previous transplant workup: none      - Social: retired, previous , lives in [place] with wife and son, previously highly functional (before October), Insurance -Medicare HMO  # MISA, Cr improving, likely pre-renal  # Chest wall hematoma  # T2DM  # Hypertension (currently normotensive)    Recs:  - would obtain urine culture given dirty UA yesterday (if pt has Urinary tract infection could be contributing to encephalopathy)  - initiate empiric Urinary tract infection treatment with CFX  - continue lactulose 3-4 times daily and Xifaxan BID for HE  - fractionate bilirubin again  - consider hematology consultation given indirect hyperbilirubinemia to evaluate for hemolysis, also abnormal SPEP  - obtain 24 hour urine copper given low ceruloplasmin (r/o Nnamdi's as etiology for cirrhosis)  - continue with q8 hour IV albumin doses as patient Cr improving  - continue with midodrine 20mg TID  - continue to hold diuretics  - obtain MRI with and without contrast of abdomen to better evaluate liver parenchyma (when patient mentating better/able to lie still)  - followup cardiology recs re: plan for LHC +/- RHC, noted recs re: DSE  - obtain outpatient colonoscopy report  - patient to be discussed at transplant evaluation committee

## 2019-12-05 NOTE — PROGRESS NOTE ADULT - SUBJECTIVE AND OBJECTIVE BOX
SUBJECTIVE / OVERNIGHT EVENTS: pt denies headache, nausea,v   chest pain, shortness of breath more confused than yesterday   + bms    MEDICATIONS  (STANDING):  albumin human 25% IVPB 100 milliLiter(s) IV Intermittent every 8 hours  atorvastatin 10 milliGRAM(s) Oral at bedtime  cefTRIAXone   IVPB 1000 milliGRAM(s) IV Intermittent every 24 hours  dextrose 5%. 1000 milliLiter(s) (50 mL/Hr) IV Continuous <Continuous>  dextrose 50% Injectable 12.5 Gram(s) IV Push once  dextrose 50% Injectable 25 Gram(s) IV Push once  dextrose 50% Injectable 25 Gram(s) IV Push once  insulin glargine Injectable (LANTUS) 20 Unit(s) SubCutaneous at bedtime  insulin lispro (HumaLOG) corrective regimen sliding scale   SubCutaneous three times a day before meals  insulin lispro (HumaLOG) corrective regimen sliding scale   SubCutaneous at bedtime  lactulose Syrup 30 Gram(s) Oral every 4 hours  midodrine. 20 milliGRAM(s) Oral three times a day  pantoprazole    Tablet 40 milliGRAM(s) Oral before breakfast  rifAXIMin 550 milliGRAM(s) Oral two times a day  sucralfate 1 Gram(s) Oral two times a day  tamsulosin 0.4 milliGRAM(s) Oral at bedtime    MEDICATIONS  (PRN):  dextrose 40% Gel 15 Gram(s) Oral once PRN Blood Glucose LESS THAN 70 milliGRAM(s)/deciliter  glucagon  Injectable 1 milliGRAM(s) IntraMuscular once PRN Glucose LESS THAN 70 milligrams/deciliter    Vital Signs Last 24 Hrs  T(C): 36.8 (05 Dec 2019 21:28), Max: 37 (05 Dec 2019 13:54)  T(F): 98.2 (05 Dec 2019 21:28), Max: 98.6 (05 Dec 2019 13:54)  HR: 79 (05 Dec 2019 22:55) (79 - 85)  BP: 125/69 (05 Dec 2019 22:55) (115/61 - 139/69)  BP(mean): --  RR: 18 (05 Dec 2019 22:55) (17 - 20)  SpO2: 99% (05 Dec 2019 22:55) (96% - 100%)    Constitutional: No fever, fatigue  Skin: No rash.  Eyes: No recent vision problems or eye pain.  ENT: No congestion, ear pain, or sore throat.  Cardiovascular: No chest pain or palpation.  Respiratory: No cough, shortness of breath, congestion, or wheezing.  Gastrointestinal: No abdominal pain, nausea, vomiting, or diarrhea.  Genitourinary: No dysuria.  Musculoskeletal: No joint swelling.  Neurologic: No headache.    PHYSICAL EXAM:  GENERAL: NAD  EYES: EOMI, PERRLA  NECK: Supple, No JVD  CHEST/LUNG: dec breath sounds at bases   HEART:  S1 , S2 +  ABDOMEN: soft , bs+, mild distension + , no tenderness  EXTREMITIES:  no edema  NEUROLOGY:alert awake oriented   flap tremor+    LABS:      134<L>  |  94<L>  |  39<H>  ----------------------------<  131<H>  4.0   |  24  |  1.70<H>    Ca    10.7<H>      05 Dec 2019 06:58  Phos  2.2       Mg     2.0         TPro  6.8  /  Alb  4.0  /  TBili  9.0<H>  /  DBili      /  AST  28  /  ALT  18  /  AlkPhos  107      Creatinine Trend: 1.70 <--, 1.74 <--, 1.99 <--, 1.96 <--, 2.22 <--, 2.49 <--, 2.47 <--, 2.50 <--, 2.29 <--, 2.31 <--, 2.55 <--                        9.6    7.21  )-----------( 47       ( 05 Dec 2019 06:59 )             27.2     Urine Studies:  Urinalysis Basic - ( 04 Dec 2019 16:50 )    Color: Yellow / Appearance: Slightly Turbid / S.018 / pH:   Gluc:  / Ketone: Negative  / Bili: Negative / Urobili: Negative   Blood:  / Protein: 100 / Nitrite: Negative   Leuk Esterase: Large / RBC: 95 /hpf / WBC 93 /HPF   Sq Epi:  / Non Sq Epi: 0 /hpf / Bacteria: Many      Creatinine, Random Urine: 118 mg/dL ( @ 03:54)  Protein/Creatinine Ratio Calculation: 0.9 Ratio ( @ 03:54)  Creatinine, Random Urine: 89 mg/dL ( @ 20:18)  Protein/Creatinine Ratio Calculation: 0.6 Ratio ( @ 20:18)  Sodium, Random Urine: <35 mmol/L ( @ 15:18)  Creatinine, Random Urine: 99 mg/dL ( @ 15:18)          LIVER FUNCTIONS - ( 05 Dec 2019 06:58 )  Alb: 4.0 g/dL / Pro: 6.8 g/dL / ALK PHOS: 107 U/L / ALT: 18 U/L / AST: 28 U/L / GGT: x           PT/INR - ( 05 Dec 2019 07:03 )   PT: 24.7 sec;   INR: 2.10 ratio         PTT - ( 04 Dec 2019 08:45 )  PTT:37.7 sec  Color: Yellow / Appearance: Slightly Turbid / S.018 / pH:   Gluc:  / Ketone: Negative  / Bili: Negative / Urobili: Negative   Blood:  / Protein: 100 / Nitrite: Negative   Leuk Esterase: Large / RBC: 95 /hpf / WBC 93 /HPF   Sq Epi:  / Non Sq Epi: 0 /hpf / Bacteria: Many      Creatinine, Random Urine: 89 mg/dL ( @ 20:18)  Protein/Creatinine Ratio Calculation: 0.6 Ratio ( @ 20:18)  Sodium, Random Urine: <35 mmol/L ( @ 15:18)  Creatinine, Random Urine: 99 mg/dL ( @ 15:18)          LIVER FUNCTIONS - ( 04 Dec 2019 18:43 )  Alb: 4.0 g/dL / Pro: 6.6 g/dL / ALK PHOS: 111 U/L / ALT: 18 U/L / AST: 27 U/L / GGT: x           PT/INR - ( 04 Dec 2019 08:45 )   PT: 23.1 sec;   INR: 1.97 ratio         PTT - ( 04 Dec 2019 08:45 )  PTT:37.7 sec  Osmolality, Random Urine: 335 mosm/Kg ( @ 00:20)  Creatinine, Random Urine: 67 mg/dL ( @ 23:21)  Creatinine, Random Urine: 68 mg/dL ( @ 21:19)  Protein/Creatinine Ratio Calculation: 0.2 Ratio ( @ 21:19)  Potassium, Random Urine: 37 mmol/L ( @ 21:19)  Sodium, Random Urine: 61 mmol/L ( @ 21:19)          LIVER FUNCTIONS - ( 01 Dec 2019 06:13 )  Alb: 4.0 g/dL / Pro: 6.9 g/dL / ALK PHOS: 123 U/L / ALT: 21 U/L / AST: 35 U/L / GGT: x           PT/INR - ( 01 Dec 2019 06:13 )   PT: 22.5 sec;   INR: 1.92 ratio         PTT - ( 2019 07:09 )  PTT:41.6 sec  Osmolality, Random Urine: 335 mosm/Kg ( @ 00:20)  Creatinine, Random Urine: 67 mg/dL ( @ 23:21)  Creatinine, Random Urine: 68 mg/dL ( @ 21:19)  Protein/Creatinine Ratio Calculation: 0.2 Ratio (:19)  Potassium, Random Urine: 37 mmol/L ( @ 21:19)  Sodium, Random Urine: 61 mmol/L ( 21:19)          LIVER FUNCTIONS - ( 2019 07:09 )  Alb: 4.0 g/dL / Pro: 6.7 g/dL / ALK PHOS: 121 U/L / ALT: 21 U/L / AST: 31 U/L / GGT: x           PT/INR - ( 2019 07:09 )   PT: 23.5 sec;   INR: 2.02 ratio         PTT - ( 2019 07:09 )  PTT:41.6 sec       PTT - ( 2019 08:22 )  PTT:37.6 sec  Color: Yellow / Appearance: Slightly Turbid / S.013 / pH:   Gluc:  / Ketone: Negative  / Bili: Negative / Urobili: <2 mg/dL   Blood:  / Protein: Trace / Nitrite: Negative   Leuk Esterase: Negative / RBC: 100 /HPF / WBC 4 /HPF   Sq Epi:  / Non Sq Epi: 0 /HPF / Bacteria: Negative      Osmolality, Random Urine: 335 mosm/Kg ( @ 00:20)  Creatinine, Random Urine: 67 mg/dL ( @ 23:21)  Creatinine, Random Urine: 68 mg/dL ( @ 21:19)  Protein/Creatinine Ratio Calculation: 0.2 Ratio ( 21:19)  Potassium, Random Urine: 37 mmol/L ( @ 21:19)  Sodium, Random Urine: 61 mmol/L ( @ 21:19)          LIVER FUNCTIONS - ( 2019 06:29 )  Alb: 3.0 g/dL / Pro: 6.9 g/dL / ALK PHOS: 182 U/L / ALT: 23 U/L / AST: 36 U/L / GGT: x           PT/INR - ( 2019 08:38 )   PT: 21.8 sec;   INR: 1.88 ratio         PTT - ( 2019 08:38 )  PTT:36.8 sec  Blood: x / Protein: Negative / Nitrite: Negative   Leuk Esterase: Negative / RBC: 14 /HPF / WBC 4 /HPF   Sq Epi: x / Non Sq Epi: 0 /HPF / Bacteria: Negative        RADIOLOGY & ADDITIONAL TESTS:    Imaging Personally Reviewed:    Consultant(s) Notes Reviewed:      Care Discussed with Consultants/Other Providers:

## 2019-12-05 NOTE — PROGRESS NOTE ADULT - SUBJECTIVE AND OBJECTIVE BOX
Chief Complaint:  Patient is a 63y old  Male who presents with a chief complaint of difficulty to ambulate/ weakness (04 Dec 2019 18:16)      Interval Events:     Allergies:  codeine (Anaphylaxis)  NO  RED MEAT (Unknown)      Hospital Medications:  atorvastatin 10 milliGRAM(s) Oral at bedtime  dextrose 40% Gel 15 Gram(s) Oral once PRN  dextrose 5%. 1000 milliLiter(s) IV Continuous <Continuous>  dextrose 50% Injectable 12.5 Gram(s) IV Push once  dextrose 50% Injectable 25 Gram(s) IV Push once  dextrose 50% Injectable 25 Gram(s) IV Push once  glucagon  Injectable 1 milliGRAM(s) IntraMuscular once PRN  insulin glargine Injectable (LANTUS) 20 Unit(s) SubCutaneous at bedtime  insulin lispro (HumaLOG) corrective regimen sliding scale   SubCutaneous three times a day before meals  insulin lispro (HumaLOG) corrective regimen sliding scale   SubCutaneous at bedtime  lactulose Syrup 30 Gram(s) Oral every 4 hours  midodrine. 20 milliGRAM(s) Oral three times a day  pantoprazole    Tablet 40 milliGRAM(s) Oral before breakfast  rifAXIMin 550 milliGRAM(s) Oral two times a day  sucralfate 1 Gram(s) Oral two times a day  tamsulosin 0.4 milliGRAM(s) Oral at bedtime      PMHX/PSHX:  GIB (gastrointestinal bleeding)  GERD with esophagitis  Hepatic encephalopathy  Obesity  Fatty liver disease, nonalcoholic  Renal stones  Hypertension  Neuropathy  Hypercholesteremia  Diabetes  S/P cholecystectomy  No significant past surgical history      Family history:  Family history of type 2 diabetes mellitus  Family history of hypertension  Family history of stomach cancer  No pertinent family history in first degree relatives      ROS:     General:  No wt loss, fevers, chills, night sweats, fatigue,   Eyes:  Good vision, no reported pain  ENT:  No sore throat, pain, runny nose, dysphagia  CV:  No pain, palpitations, hypo/hypertension  Resp:  No dyspnea, cough, tachypnea, wheezing  GI:  See HPI  :  No pain, bleeding, incontinence, nocturia  Muscle:  No pain, weakness  Neuro:  No weakness, tingling, memory problems  Psych:  No fatigue, insomnia, mood problems, depression  Endocrine:  No polyuria, polydipsia, cold/heat intolerance  Heme:  No petechiae, ecchymosis, easy bruisability  Skin:  No rash, edema      PHYSICAL EXAM:     GENERAL:  Appears stated age, well-groomed, well-nourished, no distress  HEENT:  NC/AT,  conjunctivae clear, sclera -anicteric  CHEST:  Full & symmetric excursion, no increased effort, breath sounds clear  HEART:  Regular rhythm, S1, S2, no murmur/rub/S3/S4,  no edema  ABDOMEN:  Soft, non-tender, non-distended, normoactive bowel sounds,  no masses ,no hepato-splenomegaly,   EXTREMITIES:  no cyanosis,clubbing or edema  SKIN:  No rash/erythema/ecchymoses/petechiae/wounds/abscess/warm/dry  NEURO:  Alert, oriented    Vital Signs:  Vital Signs Last 24 Hrs  T(C): 37 (05 Dec 2019 05:41), Max: 37.4 (04 Dec 2019 12:54)  T(F): 98.6 (05 Dec 2019 05:41), Max: 99.4 (04 Dec 2019 12:54)  HR: 83 (05 Dec 2019 05:41) (81 - 95)  BP: 115/61 (05 Dec 2019 05:41) (115/61 - 126/67)  BP(mean): --  RR: 18 (05 Dec 2019 05:41) (17 - 18)  SpO2: 97% (05 Dec 2019 05:41) (95% - 98%)  Daily     Daily     LABS:                        9.6    7.21  )-----------( 47       ( 05 Dec 2019 06:59 )             27.2     12-05    134<L>  |  94<L>  |  39<H>  ----------------------------<  131<H>  4.0   |  24  |  1.70<H>    Ca    10.7<H>      05 Dec 2019 06:58  Phos  2.2     12-  Mg     2.0     12-    TPro  6.8  /  Alb  4.0  /  TBili  9.0<H>  /  DBili  x   /  AST  28  /  ALT  18  /  AlkPhos  107  12-05    LIVER FUNCTIONS - ( 05 Dec 2019 06:58 )  Alb: 4.0 g/dL / Pro: 6.8 g/dL / ALK PHOS: 107 U/L / ALT: 18 U/L / AST: 28 U/L / GGT: x           PT/INR - ( 05 Dec 2019 07:03 )   PT: 24.7 sec;   INR: 2.10 ratio         PTT - ( 04 Dec 2019 08:45 )  PTT:37.7 sec  Urinalysis Basic - ( 04 Dec 2019 16:50 )    Color: Yellow / Appearance: Slightly Turbid / S.018 / pH: x  Gluc: x / Ketone: Negative  / Bili: Negative / Urobili: Negative   Blood: x / Protein: 100 / Nitrite: Negative   Leuk Esterase: Large / RBC: 95 /hpf / WBC 93 /HPF   Sq Epi: x / Non Sq Epi: 0 /hpf / Bacteria: Many      Amylase Serum--      Lipase serum--       [id number]      Imaging: Chief Complaint:  Patient is a 63y old  Male who presents with a chief complaint of difficulty to ambulate/ weakness (04 Dec 2019 18:16)      Interval Events: Patient continues to be confused this morning.  UA dirty.    Allergies:  codeine (Anaphylaxis)  NO  RED MEAT (Unknown)      Hospital Medications:  atorvastatin 10 milliGRAM(s) Oral at bedtime  dextrose 40% Gel 15 Gram(s) Oral once PRN  dextrose 5%. 1000 milliLiter(s) IV Continuous <Continuous>  dextrose 50% Injectable 12.5 Gram(s) IV Push once  dextrose 50% Injectable 25 Gram(s) IV Push once  dextrose 50% Injectable 25 Gram(s) IV Push once  glucagon  Injectable 1 milliGRAM(s) IntraMuscular once PRN  insulin glargine Injectable (LANTUS) 20 Unit(s) SubCutaneous at bedtime  insulin lispro (HumaLOG) corrective regimen sliding scale   SubCutaneous three times a day before meals  insulin lispro (HumaLOG) corrective regimen sliding scale   SubCutaneous at bedtime  lactulose Syrup 30 Gram(s) Oral every 4 hours  midodrine. 20 milliGRAM(s) Oral three times a day  pantoprazole    Tablet 40 milliGRAM(s) Oral before breakfast  rifAXIMin 550 milliGRAM(s) Oral two times a day  sucralfate 1 Gram(s) Oral two times a day  tamsulosin 0.4 milliGRAM(s) Oral at bedtime      PMHX/PSHX:  GIB (gastrointestinal bleeding)  GERD with esophagitis  Hepatic encephalopathy  Obesity  Fatty liver disease, nonalcoholic  Renal stones  Hypertension  Neuropathy  Hypercholesteremia  Diabetes  S/P cholecystectomy  No significant past surgical history      Family history:  Family history of type 2 diabetes mellitus  Family history of hypertension  Family history of stomach cancer  No pertinent family history in first degree relatives      ROS:     General:  No wt loss, fevers, chills, night sweats, fatigue,   Eyes:  Good vision, no reported pain  ENT:  No sore throat, pain, runny nose, dysphagia  CV:  No pain, palpitations, hypo/hypertension  Resp:  No dyspnea, cough, tachypnea, wheezing  GI:  See HPI  :  No pain, bleeding, incontinence, nocturia  Muscle:  No pain, weakness  Neuro:  No weakness, tingling, memory problems  Psych:  No fatigue, insomnia, mood problems, depression  Endocrine:  No polyuria, polydipsia, cold/heat intolerance  Heme:  No petechiae, ecchymosis, easy bruisability  Skin:  No rash, edema      PHYSICAL EXAM:     GENERAL: NAD  HEENT:  NC/AT, scleral icterus  CHEST:  Full & symmetric excursion, no increased effort  ABDOMEN:  Soft, non-tender, non-distended, +BS  EXTREMITIES:  no edema  SKIN:  No rash  NEURO:  Alert but confused (AxO to self only)      Vital Signs:  Vital Signs Last 24 Hrs  T(C): 37 (05 Dec 2019 05:41), Max: 37.4 (04 Dec 2019 12:54)  T(F): 98.6 (05 Dec 2019 05:41), Max: 99.4 (04 Dec 2019 12:54)  HR: 83 (05 Dec 2019 05:41) (81 - 95)  BP: 115/61 (05 Dec 2019 05:41) (115/61 - 126/67)  BP(mean): --  RR: 18 (05 Dec 2019 05:41) (17 - 18)  SpO2: 97% (05 Dec 2019 05:41) (95% - 98%)  Daily     Daily     LABS:                        9.6    7.21  )-----------( 47       ( 05 Dec 2019 06:59 )             27.2     12-05    134<L>  |  94<L>  |  39<H>  ----------------------------<  131<H>  4.0   |  24  |  1.70<H>    Ca    10.7<H>      05 Dec 2019 06:58  Phos  2.2     12-  Mg     2.0     12-    TPro  6.8  /  Alb  4.0  /  TBili  9.0<H>  /  DBili  x   /  AST  28  /  ALT  18  /  AlkPhos  107  12-05    LIVER FUNCTIONS - ( 05 Dec 2019 06:58 )  Alb: 4.0 g/dL / Pro: 6.8 g/dL / ALK PHOS: 107 U/L / ALT: 18 U/L / AST: 28 U/L / GGT: x           PT/INR - ( 05 Dec 2019 07:03 )   PT: 24.7 sec;   INR: 2.10 ratio         PTT - ( 04 Dec 2019 08:45 )  PTT:37.7 sec  Urinalysis Basic - ( 04 Dec 2019 16:50 )    Color: Yellow / Appearance: Slightly Turbid / S.018 / pH: x  Gluc: x / Ketone: Negative  / Bili: Negative / Urobili: Negative   Blood: x / Protein: 100 / Nitrite: Negative   Leuk Esterase: Large / RBC: 95 /hpf / WBC 93 /HPF   Sq Epi: x / Non Sq Epi: 0 /hpf / Bacteria: Many      Amylase Serum--      Lipase serum--       [id number]      Imaging:  reviewed

## 2019-12-05 NOTE — PROGRESS NOTE ADULT - SUBJECTIVE AND OBJECTIVE BOX
Patient is a 63y Male whom presented to the hospital with ckd and dena   pateint seen and examined nad , no fever , no chills      PAST MEDICAL & SURGICAL HISTORY:  GIB (gastrointestinal bleeding)  GERD with esophagitis: Gastritis &amp; Non Bleeding Ulcers  Hepatic encephalopathy  Obesity  Fatty liver disease, nonalcoholic  Renal stones: 25 years ago  Hypertension  Neuropathy  Hypercholesteremia  Diabetes  S/P cholecystectomy      MEDICATIONS  (STANDING):  dextrose 5%. 1000 milliLiter(s) (50 mL/Hr) IV Continuous <Continuous>  dextrose 50% Injectable 12.5 Gram(s) IV Push once  dextrose 50% Injectable 25 Gram(s) IV Push once  dextrose 50% Injectable 25 Gram(s) IV Push once  insulin lispro (HumaLOG) corrective regimen sliding scale   SubCutaneous three times a day before meals  insulin lispro (HumaLOG) corrective regimen sliding scale   SubCutaneous at bedtime      Allergies    codeine (Anaphylaxis)    Intolerances    NO  RED MEAT (Unknown)                                     SOCIAL HISTORY:  Denies ETOh,Smoking,     FAMILY HISTORY:  Family history of type 2 diabetes mellitus  Family history of hypertension  Family history of stomach cancer      REVIEW OF SYSTEMS:    CONSTITUTIONAL: No weakness, fevers or chills  RESPIRATORY: No cough, wheezing, hemoptysis; No shortness of breath  CARDIOVASCULAR: No chest pain or palpitations  GASTROINTESTINAL: No abdominal or epigastric pain. No nausea, vomiting,     No diarrhea or constipation. No melena   GENITOURINARY: No dysuria, frequency or hematuria  NEUROLOGICAL: No numbness or weakness  SKIN: dry                           PHYSICAL EXAM:    Constitutional: NAD  HEENT: conjunctive   clear   Neck:  No JVD  Respiratory: CTAB  Cardiovascular: S1 and S2  Gastrointestinal: BS+, soft, NT/ND  Extremities: No peripheral edema  Neurological:  no focal deficits  Psychiatric: Normal mood, normal affect  Skin: dry   Access: Not applicable                          9.6    7.21  )-----------( 47       ( 05 Dec 2019 06:59 )             27.2       CBC Full  -  ( 05 Dec 2019 06:59 )  WBC Count : 7.21 K/uL  RBC Count : 2.81 M/uL  Hemoglobin : 9.6 g/dL  Hematocrit : 27.2 %  Platelet Count - Automated : 47 K/uL  Mean Cell Volume : 96.8 fl  Mean Cell Hemoglobin : 34.2 pg  Mean Cell Hemoglobin Concentration : 35.3 gm/dL  Auto Neutrophil # : x  Auto Lymphocyte # : x  Auto Monocyte # : x  Auto Eosinophil # : x  Auto Basophil # : x  Auto Neutrophil % : x  Auto Lymphocyte % : x  Auto Monocyte % : x  Auto Eosinophil % : x  Auto Basophil % : x      12    134<L>  |  94<L>  |  39<H>  ----------------------------<  131<H>  4.0   |  24  |  1.70<H>    Ca    10.7<H>      05 Dec 2019 06:58  Phos  2.2     12  Mg     2.0     12    TPro  6.8  /  Alb  4.0  /  TBili  9.0<H>  /  DBili  x   /  AST  28  /  ALT  18  /  AlkPhos  107  12-      CAPILLARY BLOOD GLUCOSE      POCT Blood Glucose.: 259 mg/dL (05 Dec 2019 19:21)  POCT Blood Glucose.: 200 mg/dL (05 Dec 2019 13:31)  POCT Blood Glucose.: 176 mg/dL (05 Dec 2019 12:03)  POCT Blood Glucose.: 132 mg/dL (05 Dec 2019 08:34)  POCT Blood Glucose.: 115 mg/dL (04 Dec 2019 21:48)      Vital Signs Last 24 Hrs  T(C): 36.7 (05 Dec 2019 19:25), Max: 37 (05 Dec 2019 13:54)  T(F): 98.1 (05 Dec 2019 19:25), Max: 98.6 (05 Dec 2019 13:54)  HR: 85 (05 Dec 2019 19:25) (81 - 85)  BP: 132/73 (05 Dec 2019 19:25) (115/61 - 132/73)  BP(mean): --  RR: 17 (05 Dec 2019 19:25) (17 - 20)  SpO2: 100% (05 Dec 2019 19:25) (95% - 100%)    Urinalysis Basic - ( 04 Dec 2019 16:50 )    Color: Yellow / Appearance: Slightly Turbid / S.018 / pH: x  Gluc: x / Ketone: Negative  / Bili: Negative / Urobili: Negative   Blood: x / Protein: 100 / Nitrite: Negative   Leuk Esterase: Large / RBC: 95 /hpf / WBC 93 /HPF   Sq Epi: x / Non Sq Epi: 0 /hpf / Bacteria: Many        PT/INR - ( 05 Dec 2019 07:03 )   PT: 24.7 sec;   INR: 2.10 ratio         PTT - ( 04 Dec 2019 08:45 )  PTT:37.7 sec

## 2019-12-05 NOTE — CONSULT NOTE ADULT - SUBJECTIVE AND OBJECTIVE BOX
63M w/ pmh HTN, HLD, DM, non-alcoholic cirrhosis, chronic thrombocytopenia -- sent from rehab for worsening jaundice, dizzy/weakness/falls x 4 days. Discharged to rehab after last hospitalization. No fever, n/v/d/c, chest / abd pain, cough, sob, dysuria/hematuria. recently admitted at OSH for sepsis, hypoglycemia, cirrhosis (lactulose, xifaxan), hepatic encephalopathy, GI bleed; G+ bacteremia. Heme consulted for elevated bili, eval for hemolysis. Pt feeling fine, no complaints. Also noted to have thrombocytopenia      PAST MEDICAL & SURGICAL HISTORY:  GIB (gastrointestinal bleeding)  GERD with esophagitis: Gastritis &amp; Non Bleeding Ulcers  Hepatic encephalopathy  Obesity  Fatty liver disease, nonalcoholic  Renal stones: 25 years ago  Hypertension  Neuropathy  Hypercholesteremia  Diabetes  S/P cholecystectomy      FAMILY HISTORY:  Family history of type 2 diabetes mellitus  Family history of hypertension  Family history of stomach cancer      Alochol: Denied  Smoking: Nonsmoker  Drug Use: Denied  Marital Status:         Allergies    codeine (Anaphylaxis)    Intolerances    NO  RED MEAT (Unknown)      MEDICATIONS  (STANDING):  albumin human 25% IVPB 100 milliLiter(s) IV Intermittent every 8 hours  atorvastatin 10 milliGRAM(s) Oral at bedtime  cefTRIAXone   IVPB 1000 milliGRAM(s) IV Intermittent every 24 hours  dextrose 5%. 1000 milliLiter(s) (50 mL/Hr) IV Continuous <Continuous>  dextrose 50% Injectable 12.5 Gram(s) IV Push once  dextrose 50% Injectable 25 Gram(s) IV Push once  dextrose 50% Injectable 25 Gram(s) IV Push once  insulin glargine Injectable (LANTUS) 20 Unit(s) SubCutaneous at bedtime  insulin lispro (HumaLOG) corrective regimen sliding scale   SubCutaneous three times a day before meals  insulin lispro (HumaLOG) corrective regimen sliding scale   SubCutaneous at bedtime  lactulose Syrup 30 Gram(s) Oral every 4 hours  midodrine. 20 milliGRAM(s) Oral three times a day  pantoprazole    Tablet 40 milliGRAM(s) Oral before breakfast  rifAXIMin 550 milliGRAM(s) Oral two times a day  sucralfate 1 Gram(s) Oral two times a day  tamsulosin 0.4 milliGRAM(s) Oral at bedtime    MEDICATIONS  (PRN):  dextrose 40% Gel 15 Gram(s) Oral once PRN Blood Glucose LESS THAN 70 milliGRAM(s)/deciliter  glucagon  Injectable 1 milliGRAM(s) IntraMuscular once PRN Glucose LESS THAN 70 milligrams/deciliter      ROS  No fever, sweats, chills  No epistaxis, HA, sore throat  No CP, SOB, cough, sputum  No n/v/d, abd pain, melena, hematochezia  No edema  No rash  No anxiety  No back pain, joint pain  No bleeding, bruising  No dysuria, hematuria    T(C): 36.8 (12-05-19 @ 21:28), Max: 37 (12-05-19 @ 13:54)  HR: 79 (12-05-19 @ 22:55) (79 - 85)  BP: 125/69 (12-05-19 @ 22:55) (115/61 - 139/69)  RR: 18 (12-05-19 @ 22:55) (17 - 20)  SpO2: 99% (12-05-19 @ 22:55) (96% - 100%)  Wt(kg): --    PE  NAD  Awake, alert  Anicteric, MMM  RRR  CTAB  Abd soft, NT, ND  No c/c/e  No rash grossly  FROM                          9.6    7.21  )-----------( 47       ( 05 Dec 2019 06:59 )             27.2       12-05    134<L>  |  94<L>  |  39<H>  ----------------------------<  131<H>  4.0   |  24  |  1.70<H>    Ca    10.7<H>      05 Dec 2019 06:58  Phos  2.2     12-04  Mg     2.0     12-04    TPro  6.8  /  Alb  4.0  /  TBili  9.0<H>  /  DBili  x   /  AST  28  /  ALT  18  /  AlkPhos  107  12-05

## 2019-12-05 NOTE — PROGRESS NOTE ADULT - ASSESSMENT
63 year-old with multiple cardiovascular risk factors presents with hepatic encephalopathy.  Given hypotension requiring midodrine and body habitus that resulted in poor TTE quality on previous study, patient is not ideal candidate for dobutamine stress echo.    Left heart catheterization involves risk of bleeding in patient with low platelets and elevated INR, and additional risk of contrast induced nephropathy, however this will be the preferred test to evaluate patient with atherosclerosis of coronary vessels on CT scan and risk factors as above.    Echo with normal LV systolic function, mild pulmonary hypertension.  Cardiac cath with normal filling pressures, normal PA pressures, and without severe coronary artery disease.    No further cardiovascular work-up is necessary prior to consideration for liver transplant.  Would repeat ECG and monitor QTc. Avoid QT prolonging agents where possible.

## 2019-12-05 NOTE — PROGRESS NOTE ADULT - SUBJECTIVE AND OBJECTIVE BOX
[facility] - Sushila Dhaliwal, Doug, Morenita, Ayo, Colleen Williamson  Office Number:  [phone]    Patient resting comfortably in bed in NAD.  Laying flat with no respiratory distress.  No complaints of chest pain, dyspnea, palpitations, PND, or orthopnea.    ROS: negative unless otherwise mentioned.    Telemetry:  off    MEDICATIONS  (STANDING):  atorvastatin 10 milliGRAM(s) Oral at bedtime  dextrose 5%. 1000 milliLiter(s) (50 mL/Hr) IV Continuous <Continuous>  dextrose 50% Injectable 12.5 Gram(s) IV Push once  dextrose 50% Injectable 25 Gram(s) IV Push once  dextrose 50% Injectable 25 Gram(s) IV Push once  insulin glargine Injectable (LANTUS) 20 Unit(s) SubCutaneous at bedtime  insulin lispro (HumaLOG) corrective regimen sliding scale   SubCutaneous three times a day before meals  insulin lispro (HumaLOG) corrective regimen sliding scale   SubCutaneous at bedtime  lactulose Syrup 30 Gram(s) Oral every 4 hours  midodrine. 20 milliGRAM(s) Oral three times a day  pantoprazole    Tablet 40 milliGRAM(s) Oral before breakfast  rifAXIMin 550 milliGRAM(s) Oral two times a day  sucralfate 1 Gram(s) Oral two times a day  tamsulosin 0.4 milliGRAM(s) Oral at bedtime    MEDICATIONS  (PRN):  dextrose 40% Gel 15 Gram(s) Oral once PRN Blood Glucose LESS THAN 70 milliGRAM(s)/deciliter  glucagon  Injectable 1 milliGRAM(s) IntraMuscular once PRN Glucose LESS THAN 70 milligrams/deciliter      Allergies    codeine (Anaphylaxis)    Intolerances    NO  RED MEAT (Unknown)      Vital Signs Last 24 Hrs  T(C): 37 (05 Dec 2019 05:41), Max: 37.4 (04 Dec 2019 12:54)  T(F): 98.6 (05 Dec 2019 05:41), Max: 99.4 (04 Dec 2019 12:54)  HR: 83 (05 Dec 2019 05:41) (81 - 95)  BP: 115/61 (05 Dec 2019 05:41) (115/61 - 126/67)  BP(mean): --  RR: 18 (05 Dec 2019 05:41) (17 - 18)  SpO2: 97% (05 Dec 2019 05:41) (95% - 98%)    I&O's Summary    04 Dec 2019 07:01  -  05 Dec 2019 07:00  --------------------------------------------------------  IN: 710 mL / OUT: 601 mL / NET: 109 mL        ON EXAM:    Constitutional: NAD, awake   HEENT: Moist Mucous Membranes, Anicteric  Pulmonary: Non-labored, breath sounds are clear bilaterally, No wheezing, rales or rhonchi  Cardiovascular: Regular, S1 and S2, No murmurs, rubs, gallops or clicks  Gastrointestinal: Bowel Sounds present, soft, nontender.   Lymph: No peripheral edema. No lymphadenopathy.  Skin: No visible rashes or ulcers.  Psych:  Mood & affect appropriate for situation    LABS: All Labs Reviewed:                        9.6    7.21  )-----------( 47       ( 05 Dec 2019 06:59 )             27.2                         9.1    8.40  )-----------( 36       ( 04 Dec 2019 18:43 )             25.9                         9.8    7.39  )-----------( 46       ( 04 Dec 2019 08:38 )             28.6     05 Dec 2019 06:58    134    |  94     |  39     ----------------------------<  131    4.0     |  24     |  1.70   04 Dec 2019 18:43    133    |  92     |  39     ----------------------------<  132    3.7     |  26     |  1.74   04 Dec 2019 07:13    133    |  93     |  45     ----------------------------<  176    3.9     |  26     |  1.99     Ca    10.7       05 Dec 2019 06:58  Ca    10.5       04 Dec 2019 18:43  Ca    10.7       04 Dec 2019 07:13  Phos  2.2       04 Dec 2019 07:13  Mg     2.0       04 Dec 2019 07:13    TPro  6.8    /  Alb  4.0    /  TBili  9.0    /  DBili  x      /  AST  28     /  ALT  18     /  AlkPhos  107    05 Dec 2019 06:58  TPro  6.6    /  Alb  4.0    /  TBili  8.1    /  DBili  x      /  AST  27     /  ALT  18     /  AlkPhos  111    04 Dec 2019 18:43  TPro  7.0    /  Alb  4.2    /  TBili  8.6    /  DBili  2.7    /  AST  31     /  ALT  19     /  AlkPhos  126    04 Dec 2019 07:13    PT/INR - ( 05 Dec 2019 07:03 )   PT: 24.7 sec;   INR: 2.10 ratio         PTT - ( 04 Dec 2019 08:45 )  PTT:37.7 sec      Blood Culture:     12-03 @ 09:10  TSH: 1.72

## 2019-12-05 NOTE — CONSULT NOTE ADULT - ASSESSMENT
63M w/ pmh HTN, HLD, DM, non-alcoholic cirrhosis, chronic thrombocytopenia -- sent from rehab for worsening jaundice, dizzy/weakness/falls, consulted for possible hemolysis and thrombocytopenia    1. anemia, ? hemolysis -- bili elevated but hgb has been stable  -- likely at least component of ACD given chronic diseases, s/p bleeding. Unclear if there is hemolysis component but will complete w/u  -- check hapto, LDH, urine hemosiderin, retic  -- also check ferritin, iron panel, B12, folate  -- ferritin was adequate in 10/2019, hapto pos then as well but can represent low grade hemolysis vs liver dysfunction  -- FOBT pos in the past  -- monitor CBC    2. 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 cirrhosis -- per GI and hepatology  -- transplant eval per specialists    4. thrombocytopenia -- likely related to cirrhosis, monitor, asx    Plan and impression d/w pt and primary team. Will follow, [phone]

## 2019-12-05 NOTE — PROGRESS NOTE ADULT - ASSESSMENT
63 year old male with HTN, DM2, non-alcoholic cirrhosis, who presents with altered mental status. We last saw him during a hospitalization in 10/2019, during which he was significantly volume overloaded and required iv diuresis.  He was initially confused though his mental status has improved. He remains with hyponatremia and high ammonia level.    Abnormal EKG  - EKG is notable for prolonged qtc which was still present on 11/30  - avoid qtc prolonging medications  - replete K  - check daily mg as well  - Monitor and replete electrolytes. Keep K>4.0 and Mg>2.0.     Diastolic HF   - no sign of acute ischemia  - no significant volume overload on exam. CT without pleural effusions or edema, though notable for possible chest wall hematoma.  - can hold diuretics.   - GI and renal follow up.  - TTE normal LV systolic function EF 65%; LVH, DD Grade I  - as part of his transplant evaluation, he will have a diagnostic cardiac cath today (organized by Dr. Knowles).    HTN  - BP on softer side, though has been better overall  - hold diuretics and anti-hypertensives  - midodrine as needed    - cont statin for cad/atherosclerosis noted on ct  - lfts remain within normal limits    - Watch creatinine and electrolytes. Keep K>4, mg>2  - All other workup as per primary team  - Will follow with you. Further cardiac w/u as indicated by clinical course

## 2019-12-05 NOTE — PROGRESS NOTE ADULT - ATTENDING COMMENTS
Hepatology Staff: Yomi Medina MD  I saw and examined the patient along with  Dr. Del Real 12-05-19 @ 09:22.    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 Still confused this AM.    Hemodynamic Status: stable. Not orthostatic anymore.     Vitals: Vital Signs Last 24 Hrs  T(C): 37 (05 Dec 2019 05:41), Max: 37.4 (04 Dec 2019 12:54)  T(F): 98.6 (05 Dec 2019 05:41), Max: 99.4 (04 Dec 2019 12:54)  HR: 83 (05 Dec 2019 05:41) (81 - 95)  BP: 115/61 (05 Dec 2019 05:41) (115/61 - 126/67)  RR: 18 (05 Dec 2019 05:41) (17 - 18)  SpO2: 97% (05 Dec 2019 05:41) (95% - 98%)    IV Fluids: on IV Albumin  Antibiotics: None  Diuretics: On hold.    Labs:INR: 2.10 ratio (12-05-19 @ 07:03)  Creatinine, Serum: 1.70 mg/dL (12-05-19 @ 06:58)  Bilirubin Total, Serum: 9.0 mg/dL (12-05-19 @ 06:58)  Creatinine, Serum: 1.74 mg/dL (12-04-19 @ 18:43)  Bilirubin Total, Serum: 8.1 mg/dL (12-04-19 @ 18:43)  PT/INR - ( 05 Dec 2019 07:03 )   PT: 24.7 sec;   INR: 2.10 ratio      I/O: I&O's Summary    04 Dec 2019 07:01  -  05 Dec 2019 07:00  --------------------------------------------------------  IN: 710 mL / OUT: 601 mL / NET: 109 mL    Nutritional Status:   Albumin, Serum: 4.0 g/dL (12-05-19 @ 06:58)    Last 24 hour events: Still confused.    Recommendations: Send urine Cx. UA appears dirty. Will start empiric IV Ceftriaxone. Check urine copper ( 24 hrs) as pt has a low serum Ceruloplasmin level. Keep on IV Albumin 25% 25 g q 8hrs. Discussed with Dr. Knowles, and will need a cardiac cath.  MRI abdomen is pending ( w/wo contrast).    Plan discussed with Primary team.

## 2019-12-06 LAB
ALBUMIN SERPL ELPH-MCNC: 3.9 G/DL — SIGNIFICANT CHANGE UP (ref 3.3–5)
ALP SERPL-CCNC: 116 U/L — SIGNIFICANT CHANGE UP (ref 40–120)
ALT FLD-CCNC: 18 U/L — SIGNIFICANT CHANGE UP (ref 10–45)
ANION GAP SERPL CALC-SCNC: 14 MMOL/L — SIGNIFICANT CHANGE UP (ref 5–17)
APCR PPP: 2.77 RATIO — SIGNIFICANT CHANGE UP
AST SERPL-CCNC: 26 U/L — SIGNIFICANT CHANGE UP (ref 10–40)
BASOPHILS # BLD AUTO: 0.05 K/UL — SIGNIFICANT CHANGE UP (ref 0–0.2)
BASOPHILS NFR BLD AUTO: 0.8 % — SIGNIFICANT CHANGE UP (ref 0–2)
BILIRUB SERPL-MCNC: 8.2 MG/DL — HIGH (ref 0.2–1.2)
BUN SERPL-MCNC: 35 MG/DL — HIGH (ref 7–23)
CALCIUM SERPL-MCNC: 10.9 MG/DL — HIGH (ref 8.4–10.5)
CHLORIDE SERPL-SCNC: 92 MMOL/L — LOW (ref 96–108)
CO2 SERPL-SCNC: 26 MMOL/L — SIGNIFICANT CHANGE UP (ref 22–31)
CREAT SERPL-MCNC: 1.64 MG/DL — HIGH (ref 0.5–1.3)
EOSINOPHIL # BLD AUTO: 0.16 K/UL — SIGNIFICANT CHANGE UP (ref 0–0.5)
EOSINOPHIL NFR BLD AUTO: 2.7 % — SIGNIFICANT CHANGE UP (ref 0–6)
FERRITIN SERPL-MCNC: 568 NG/ML — HIGH (ref 30–400)
FOLATE SERPL-MCNC: 11.8 NG/ML — SIGNIFICANT CHANGE UP
GAMMA INTERFERON BACKGROUND BLD IA-ACNC: 0.16 IU/ML — SIGNIFICANT CHANGE UP
GLUCOSE BLDC GLUCOMTR-MCNC: 140 MG/DL — HIGH (ref 70–99)
GLUCOSE BLDC GLUCOMTR-MCNC: 163 MG/DL — HIGH (ref 70–99)
GLUCOSE BLDC GLUCOMTR-MCNC: 236 MG/DL — HIGH (ref 70–99)
GLUCOSE SERPL-MCNC: 153 MG/DL — HIGH (ref 70–99)
HAPTOGLOB SERPL-MCNC: <20 MG/DL — LOW (ref 34–200)
HCT VFR BLD CALC: 26.3 % — LOW (ref 39–50)
HGB BLD-MCNC: 9.2 G/DL — LOW (ref 13–17)
IGA FLD-MCNC: 727 MG/DL — HIGH (ref 84–499)
IGG FLD-MCNC: 1209 MG/DL — SIGNIFICANT CHANGE UP (ref 610–1660)
IGM SERPL-MCNC: 109 MG/DL — SIGNIFICANT CHANGE UP (ref 35–242)
IMM GRANULOCYTES NFR BLD AUTO: 0.3 % — SIGNIFICANT CHANGE UP (ref 0–1.5)
INR BLD: 2.21 RATIO — HIGH (ref 0.88–1.16)
KAPPA LC SER QL IFE: 10.93 MG/DL — HIGH (ref 0.33–1.94)
KAPPA LC SER QL IFE: 10.93 MG/DL — HIGH (ref 0.33–1.94)
KAPPA/LAMBDA FREE LIGHT CHAIN RATIO, SERUM: 2.91 RATIO — HIGH (ref 0.26–1.65)
KAPPA/LAMBDA FREE LIGHT CHAIN RATIO, SERUM: 2.91 RATIO — HIGH (ref 0.26–1.65)
LAMBDA LC SER QL IFE: 3.76 MG/DL — HIGH (ref 0.57–2.63)
LAMBDA LC SER QL IFE: 3.76 MG/DL — HIGH (ref 0.57–2.63)
LYMPHOCYTES # BLD AUTO: 1.23 K/UL — SIGNIFICANT CHANGE UP (ref 1–3.3)
LYMPHOCYTES # BLD AUTO: 20.7 % — SIGNIFICANT CHANGE UP (ref 13–44)
M TB IFN-G BLD-IMP: NEGATIVE — SIGNIFICANT CHANGE UP
M TB IFN-G CD4+ BCKGRND COR BLD-ACNC: 0 IU/ML — SIGNIFICANT CHANGE UP
M TB IFN-G CD4+CD8+ BCKGRND COR BLD-ACNC: 0.01 IU/ML — SIGNIFICANT CHANGE UP
MCHC RBC-ENTMCNC: 34.1 PG — HIGH (ref 27–34)
MCHC RBC-ENTMCNC: 35 GM/DL — SIGNIFICANT CHANGE UP (ref 32–36)
MCV RBC AUTO: 97.4 FL — SIGNIFICANT CHANGE UP (ref 80–100)
MONOCYTES # BLD AUTO: 0.87 K/UL — SIGNIFICANT CHANGE UP (ref 0–0.9)
MONOCYTES NFR BLD AUTO: 14.7 % — HIGH (ref 2–14)
NEUTROPHILS # BLD AUTO: 3.6 K/UL — SIGNIFICANT CHANGE UP (ref 1.8–7.4)
NEUTROPHILS NFR BLD AUTO: 60.8 % — SIGNIFICANT CHANGE UP (ref 43–77)
PLATELET # BLD AUTO: 46 K/UL — LOW (ref 150–400)
POTASSIUM SERPL-MCNC: 3.5 MMOL/L — SIGNIFICANT CHANGE UP (ref 3.5–5.3)
POTASSIUM SERPL-SCNC: 3.5 MMOL/L — SIGNIFICANT CHANGE UP (ref 3.5–5.3)
PROT SERPL-MCNC: 6.6 G/DL — SIGNIFICANT CHANGE UP (ref 6–8.3)
PROTHROM AB SERPL-ACNC: 25.6 SEC — HIGH (ref 10–13.1)
QUANT TB PLUS MITOGEN MINUS NIL: 2.54 IU/ML — SIGNIFICANT CHANGE UP
RBC # BLD: 2.7 M/UL — LOW (ref 4.2–5.8)
RBC # BLD: 2.7 M/UL — LOW (ref 4.2–5.8)
RBC # FLD: 18.4 % — HIGH (ref 10.3–14.5)
RETICS #: 60.2 K/UL — SIGNIFICANT CHANGE UP (ref 25–125)
RETICS/RBC NFR: 2.2 % — SIGNIFICANT CHANGE UP (ref 0.5–2.5)
SODIUM SERPL-SCNC: 132 MMOL/L — LOW (ref 135–145)
VIT B12 SERPL-MCNC: 1652 PG/ML — HIGH (ref 232–1245)
WBC # BLD: 5.93 K/UL — SIGNIFICANT CHANGE UP (ref 3.8–10.5)
WBC # FLD AUTO: 5.93 K/UL — SIGNIFICANT CHANGE UP (ref 3.8–10.5)

## 2019-12-06 PROCEDURE — 99232 SBSQ HOSP IP/OBS MODERATE 35: CPT

## 2019-12-06 PROCEDURE — 74183 MRI ABD W/O CNTR FLWD CNTR: CPT | Mod: 26

## 2019-12-06 RX ADMIN — ATORVASTATIN CALCIUM 10 MILLIGRAM(S): 80 TABLET, FILM COATED ORAL at 21:36

## 2019-12-06 RX ADMIN — LACTULOSE 30 GRAM(S): 10 SOLUTION ORAL at 14:38

## 2019-12-06 RX ADMIN — MIDODRINE HYDROCHLORIDE 20 MILLIGRAM(S): 2.5 TABLET ORAL at 14:38

## 2019-12-06 RX ADMIN — LACTULOSE 30 GRAM(S): 10 SOLUTION ORAL at 17:54

## 2019-12-06 RX ADMIN — LACTULOSE 30 GRAM(S): 10 SOLUTION ORAL at 05:09

## 2019-12-06 RX ADMIN — Medication 1 GRAM(S): at 05:10

## 2019-12-06 RX ADMIN — TAMSULOSIN HYDROCHLORIDE 0.4 MILLIGRAM(S): 0.4 CAPSULE ORAL at 21:36

## 2019-12-06 RX ADMIN — Medication 1: at 08:37

## 2019-12-06 RX ADMIN — Medication 1 GRAM(S): at 17:54

## 2019-12-06 RX ADMIN — MIDODRINE HYDROCHLORIDE 20 MILLIGRAM(S): 2.5 TABLET ORAL at 05:10

## 2019-12-06 RX ADMIN — INSULIN GLARGINE 20 UNIT(S): 100 INJECTION, SOLUTION SUBCUTANEOUS at 21:36

## 2019-12-06 RX ADMIN — PANTOPRAZOLE SODIUM 40 MILLIGRAM(S): 20 TABLET, DELAYED RELEASE ORAL at 05:10

## 2019-12-06 RX ADMIN — Medication 2: at 17:54

## 2019-12-06 RX ADMIN — MIDODRINE HYDROCHLORIDE 20 MILLIGRAM(S): 2.5 TABLET ORAL at 17:54

## 2019-12-06 RX ADMIN — Medication 50 MILLILITER(S): at 05:10

## 2019-12-06 RX ADMIN — CEFTRIAXONE 100 MILLIGRAM(S): 500 INJECTION, POWDER, FOR SOLUTION INTRAMUSCULAR; INTRAVENOUS at 21:36

## 2019-12-06 NOTE — PROGRESS NOTE ADULT - SUBJECTIVE AND OBJECTIVE BOX
[facility] - Sushila Dhaliwal, Doug, Morenita, Ayo, Colleen Williamson  Office Number:  [phone]    Patient resting comfortably in bed in NAD.  Laying flat with no respiratory distress.  No complaints of chest pain, dyspnea, palpitations, PND, or orthopnea.  s/p cath.  tolerated procedure well.    F/U for:  prolonged qt       MEDICATIONS  (STANDING):  atorvastatin 10 milliGRAM(s) Oral at bedtime  cefTRIAXone   IVPB 1000 milliGRAM(s) IV Intermittent every 24 hours  dextrose 5%. 1000 milliLiter(s) (50 mL/Hr) IV Continuous <Continuous>  dextrose 50% Injectable 12.5 Gram(s) IV Push once  dextrose 50% Injectable 25 Gram(s) IV Push once  dextrose 50% Injectable 25 Gram(s) IV Push once  insulin glargine Injectable (LANTUS) 20 Unit(s) SubCutaneous at bedtime  insulin lispro (HumaLOG) corrective regimen sliding scale   SubCutaneous three times a day before meals  insulin lispro (HumaLOG) corrective regimen sliding scale   SubCutaneous at bedtime  lactulose Syrup 30 Gram(s) Oral every 4 hours  midodrine. 20 milliGRAM(s) Oral three times a day  pantoprazole    Tablet 40 milliGRAM(s) Oral before breakfast  rifAXIMin 550 milliGRAM(s) Oral two times a day  sucralfate 1 Gram(s) Oral two times a day  tamsulosin 0.4 milliGRAM(s) Oral at bedtime    MEDICATIONS  (PRN):  dextrose 40% Gel 15 Gram(s) Oral once PRN Blood Glucose LESS THAN 70 milliGRAM(s)/deciliter  glucagon  Injectable 1 milliGRAM(s) IntraMuscular once PRN Glucose LESS THAN 70 milligrams/deciliter      Allergies    codeine (Anaphylaxis)    Intolerances    NO  RED MEAT (Unknown)      Vital Signs Last 24 Hrs  T(C): 36.7 (06 Dec 2019 04:19), Max: 37 (05 Dec 2019 13:54)  T(F): 98.1 (06 Dec 2019 04:19), Max: 98.6 (05 Dec 2019 13:54)  HR: 75 (06 Dec 2019 04:19) (75 - 85)  BP: 106/62 (06 Dec 2019 04:19) (106/62 - 139/69)  BP(mean): --  RR: 18 (06 Dec 2019 04:19) (17 - 20)  SpO2: 99% (06 Dec 2019 04:19) (96% - 100%)    I&O's Summary    05 Dec 2019 07:01  -  06 Dec 2019 07:00  --------------------------------------------------------  IN: 400 mL / OUT: 980 mL / NET: -580 mL        ON EXAM:    Constitutional: NAD, awake   HEENT: Moist Mucous Membranes, Anicteric  Pulmonary: Non-labored, breath sounds are clear bilaterally, No wheezing, rales or rhonchi  Cardiovascular: Regular, S1 and S2, No murmurs, rubs, gallops or clicks  Gastrointestinal: Bowel Sounds present, soft, nontender.   Lymph: No peripheral edema. No lymphadenopathy.  Skin: No visible rashes or ulcers.  Psych:  Mood & affect appropriate for situation    LABS: All Labs Reviewed:                        9.6    7.21  )-----------( 47       ( 05 Dec 2019 06:59 )             27.2                         9.1    8.40  )-----------( 36       ( 04 Dec 2019 18:43 )             25.9                         9.8    7.39  )-----------( 46       ( 04 Dec 2019 08:38 )             28.6     06 Dec 2019 06:25    132    |  92     |  35     ----------------------------<  153    3.5     |  26     |  1.64   05 Dec 2019 06:58    134    |  94     |  39     ----------------------------<  131    4.0     |  24     |  1.70   04 Dec 2019 18:43    133    |  92     |  39     ----------------------------<  132    3.7     |  26     |  1.74     Ca    10.9       06 Dec 2019 06:25  Ca    10.7       05 Dec 2019 06:58  Ca    10.5       04 Dec 2019 18:43  Phos  2.2       04 Dec 2019 07:13  Mg     2.0       04 Dec 2019 07:13    TPro  6.6    /  Alb  3.9    /  TBili  8.2    /  DBili  x      /  AST  26     /  ALT  18     /  AlkPhos  116    06 Dec 2019 06:25  TPro  6.8    /  Alb  4.0    /  TBili  9.0    /  DBili  x      /  AST  28     /  ALT  18     /  AlkPhos  107    05 Dec 2019 06:58  TPro  6.6    /  Alb  4.0    /  TBili  8.1    /  DBili  x      /  AST  27     /  ALT  18     /  AlkPhos  111    04 Dec 2019 18:43    PT/INR - ( 05 Dec 2019 07:03 )   PT: 24.7 sec;   INR: 2.10 ratio         PTT - ( 04 Dec 2019 08:45 )  PTT:37.7 sec      Blood Culture:     12-03 @ 09:10  TSH: 1.72

## 2019-12-06 NOTE — PROGRESS NOTE ADULT - PROBLEM SELECTOR PLAN 1
- conts with intermittent episodes of confusion   - titrate lactulose for 3 BM/day; please give Lactulose Enemas if pt unable to tolerate PO intake   - continue xifaxan bid   - diet as tolerated with aspiration precautions

## 2019-12-06 NOTE — PROGRESS NOTE ADULT - SUBJECTIVE AND OBJECTIVE BOX
Chief Complaint:  Patient is a 63y old  Male who presents with a chief complaint of difficulty to ambulate/ weakness (06 Dec 2019 07:18)      Interval Events: No adverse events overnight    Allergies:  codeine (Anaphylaxis)  NO  RED MEAT (Unknown)      Hospital Medications:  atorvastatin 10 milliGRAM(s) Oral at bedtime  cefTRIAXone   IVPB 1000 milliGRAM(s) IV Intermittent every 24 hours  dextrose 40% Gel 15 Gram(s) Oral once PRN  dextrose 5%. 1000 milliLiter(s) IV Continuous <Continuous>  dextrose 50% Injectable 12.5 Gram(s) IV Push once  dextrose 50% Injectable 25 Gram(s) IV Push once  dextrose 50% Injectable 25 Gram(s) IV Push once  glucagon  Injectable 1 milliGRAM(s) IntraMuscular once PRN  insulin glargine Injectable (LANTUS) 20 Unit(s) SubCutaneous at bedtime  insulin lispro (HumaLOG) corrective regimen sliding scale   SubCutaneous three times a day before meals  insulin lispro (HumaLOG) corrective regimen sliding scale   SubCutaneous at bedtime  lactulose Syrup 30 Gram(s) Oral every 4 hours  midodrine. 20 milliGRAM(s) Oral three times a day  pantoprazole    Tablet 40 milliGRAM(s) Oral before breakfast  rifAXIMin 550 milliGRAM(s) Oral two times a day  sucralfate 1 Gram(s) Oral two times a day  tamsulosin 0.4 milliGRAM(s) Oral at bedtime      PMHX/PSHX:  GIB (gastrointestinal bleeding)  GERD with esophagitis  Hepatic encephalopathy  Obesity  Fatty liver disease, nonalcoholic  Renal stones  Hypertension  Neuropathy  Hypercholesteremia  Diabetes  S/P cholecystectomy  No significant past surgical history      Family history:  Family history of type 2 diabetes mellitus  Family history of hypertension  Family history of stomach cancer  No pertinent family history in first degree relatives      ROS:     General:  No wt loss, fevers, chills, night sweats, fatigue,   Eyes:  Good vision, no reported pain  ENT:  No sore throat, pain, runny nose, dysphagia  CV:  No pain, palpitations, hypo/hypertension  Resp:  No dyspnea, cough, tachypnea, wheezing  GI:  See HPI  :  No pain, bleeding, incontinence, nocturia  Muscle:  No pain, weakness  Neuro:  No weakness, tingling, memory problems  Psych:  No fatigue, insomnia, mood problems, depression  Endocrine:  No polyuria, polydipsia, cold/heat intolerance  Heme:  No petechiae, ecchymosis, easy bruisability  Skin:  No rash, edema      PHYSICAL EXAM:     GENERAL:  Appears stated age, well-groomed, well-nourished, no distress  HEENT:  NC/AT,  conjunctivae clear, sclera -anicteric  CHEST:  Full & symmetric excursion, no increased effort, breath sounds clear  HEART:  Regular rhythm, S1, S2, no murmur/rub/S3/S4,  no edema  ABDOMEN:  Soft, non-tender, non-distended, normoactive bowel sounds,  no masses ,no hepato-splenomegaly,   EXTREMITIES:  no cyanosis,clubbing or edema  SKIN:  No rash/erythema/ecchymoses/petechiae/wounds/abscess/warm/dry  NEURO:  Alert, oriented    Vital Signs:  Vital Signs Last 24 Hrs  T(C): 36.7 (06 Dec 2019 04:19), Max: 37 (05 Dec 2019 13:54)  T(F): 98.1 (06 Dec 2019 04:19), Max: 98.6 (05 Dec 2019 13:54)  HR: 75 (06 Dec 2019 04:19) (75 - 85)  BP: 106/62 (06 Dec 2019 04:19) (106/62 - 139/69)  BP(mean): --  RR: 18 (06 Dec 2019 04:19) (17 - 20)  SpO2: 99% (06 Dec 2019 04:19) (96% - 100%)  Daily     Daily     LABS:                        9.2    5.93  )-----------( 46       ( 06 Dec 2019 07:44 )             26.3     12    132<L>  |  92<L>  |  35<H>  ----------------------------<  153<H>  3.5   |  26  |  1.64<H>    Ca    10.9<H>      06 Dec 2019 06:25    TPro  6.6  /  Alb  3.9  /  TBili  8.2<H>  /  DBili  x   /  AST  26  /  ALT  18  /  AlkPhos  116  12-06    LIVER FUNCTIONS - ( 06 Dec 2019 06:25 )  Alb: 3.9 g/dL / Pro: 6.6 g/dL / ALK PHOS: 116 U/L / ALT: 18 U/L / AST: 26 U/L / GGT: x           PT/INR - ( 05 Dec 2019 07:03 )   PT: 24.7 sec;   INR: 2.10 ratio           Urinalysis Basic - ( 04 Dec 2019 16:50 )    Color: Yellow / Appearance: Slightly Turbid / S.018 / pH: x  Gluc: x / Ketone: Negative  / Bili: Negative / Urobili: Negative   Blood: x / Protein: 100 / Nitrite: Negative   Leuk Esterase: Large / RBC: 95 /hpf / WBC 93 /HPF   Sq Epi: x / Non Sq Epi: 0 /hpf / Bacteria: Many          Imaging: Chief Complaint:  Patient is a 63y old  Male who presents with a chief complaint of difficulty to ambulate/ weakness (06 Dec 2019 07:18)      Interval Events: No adverse events overnight, mentation improved    Allergies:  codeine (Anaphylaxis)  NO  RED MEAT (Unknown)      Hospital Medications:  atorvastatin 10 milliGRAM(s) Oral at bedtime  cefTRIAXone   IVPB 1000 milliGRAM(s) IV Intermittent every 24 hours  dextrose 40% Gel 15 Gram(s) Oral once PRN  dextrose 5%. 1000 milliLiter(s) IV Continuous <Continuous>  dextrose 50% Injectable 12.5 Gram(s) IV Push once  dextrose 50% Injectable 25 Gram(s) IV Push once  dextrose 50% Injectable 25 Gram(s) IV Push once  glucagon  Injectable 1 milliGRAM(s) IntraMuscular once PRN  insulin glargine Injectable (LANTUS) 20 Unit(s) SubCutaneous at bedtime  insulin lispro (HumaLOG) corrective regimen sliding scale   SubCutaneous three times a day before meals  insulin lispro (HumaLOG) corrective regimen sliding scale   SubCutaneous at bedtime  lactulose Syrup 30 Gram(s) Oral every 4 hours  midodrine. 20 milliGRAM(s) Oral three times a day  pantoprazole    Tablet 40 milliGRAM(s) Oral before breakfast  rifAXIMin 550 milliGRAM(s) Oral two times a day  sucralfate 1 Gram(s) Oral two times a day  tamsulosin 0.4 milliGRAM(s) Oral at bedtime      PMHX/PSHX:  GIB (gastrointestinal bleeding)  GERD with esophagitis  Hepatic encephalopathy  Obesity  Fatty liver disease, nonalcoholic  Renal stones  Hypertension  Neuropathy  Hypercholesteremia  Diabetes  S/P cholecystectomy  No significant past surgical history      Family history:  Family history of type 2 diabetes mellitus  Family history of hypertension  Family history of stomach cancer  No pertinent family history in first degree relatives      ROS:     General:  No wt loss, fevers, chills, night sweats, fatigue,   Eyes:  Good vision, no reported pain  ENT:  No sore throat, pain, runny nose, dysphagia  CV:  No pain, palpitations, hypo/hypertension  Resp:  No dyspnea, cough, tachypnea, wheezing  GI:  See HPI  :  No pain, bleeding, incontinence, nocturia  Muscle:  No pain, weakness  Neuro:  No weakness, tingling, memory problems  Psych:  No fatigue, insomnia, mood problems, depression  Endocrine:  No polyuria, polydipsia, cold/heat intolerance  Heme:  No petechiae, ecchymosis, easy bruisability  Skin:  No rash, edema    PHYSICAL EXAM:     GENERAL: NAD  HEENT:  NC/AT, scleral icterus  CHEST:  Full & symmetric excursion, no increased effort  ABDOMEN:  Soft, non-tender, non-distended, +BS  EXTREMITIES:  no edema  SKIN:  No rash  NEURO:  Alert, oriented x 3      Vital Signs:  Vital Signs Last 24 Hrs  T(C): 36.7 (06 Dec 2019 04:19), Max: 37 (05 Dec 2019 13:54)  T(F): 98.1 (06 Dec 2019 04:19), Max: 98.6 (05 Dec 2019 13:54)  HR: 75 (06 Dec 2019 04:19) (75 - 85)  BP: 106/62 (06 Dec 2019 04:19) (106/62 - 139/69)  BP(mean): --  RR: 18 (06 Dec 2019 04:19) (17 - 20)  SpO2: 99% (06 Dec 2019 04:19) (96% - 100%)  Daily     Daily     LABS:                        9.2    5.93  )-----------( 46       ( 06 Dec 2019 07:44 )             26.3     12-06    132<L>  |  92<L>  |  35<H>  ----------------------------<  153<H>  3.5   |  26  |  1.64<H>    Ca    10.9<H>      06 Dec 2019 06:25    TPro  6.6  /  Alb  3.9  /  TBili  8.2<H>  /  DBili  x   /  AST  26  /  ALT  18  /  AlkPhos  116  12-06    LIVER FUNCTIONS - ( 06 Dec 2019 06:25 )  Alb: 3.9 g/dL / Pro: 6.6 g/dL / ALK PHOS: 116 U/L / ALT: 18 U/L / AST: 26 U/L / GGT: x           PT/INR - ( 05 Dec 2019 07:03 )   PT: 24.7 sec;   INR: 2.10 ratio           Urinalysis Basic - ( 04 Dec 2019 16:50 )    Color: Yellow / Appearance: Slightly Turbid / S.018 / pH: x  Gluc: x / Ketone: Negative  / Bili: Negative / Urobili: Negative   Blood: x / Protein: 100 / Nitrite: Negative   Leuk Esterase: Large / RBC: 95 /hpf / WBC 93 /HPF   Sq Epi: x / Non Sq Epi: 0 /hpf / Bacteria: Many          Imaging:  reviewed

## 2019-12-06 NOTE — PROGRESS NOTE ADULT - SUBJECTIVE AND OBJECTIVE BOX
Pt is seen and examined  pt is awake and lying in bed   No new complaints  No bleeding  H/H stable      PAST MEDICAL & SURGICAL HISTORY:  GIB (gastrointestinal bleeding)  GERD with esophagitis: Gastritis &amp; Non Bleeding Ulcers  Hepatic encephalopathy  Obesity  Fatty liver disease, nonalcoholic  Renal stones: 25 years ago  Hypertension  Neuropathy  Hypercholesteremia  Diabetes  S/P cholecystectomy      ROS:  Negative except for:    MEDICATIONS  (STANDING):  atorvastatin 10 milliGRAM(s) Oral at bedtime  cefTRIAXone   IVPB 1000 milliGRAM(s) IV Intermittent every 24 hours  dextrose 5%. 1000 milliLiter(s) (50 mL/Hr) IV Continuous <Continuous>  dextrose 50% Injectable 12.5 Gram(s) IV Push once  dextrose 50% Injectable 25 Gram(s) IV Push once  dextrose 50% Injectable 25 Gram(s) IV Push once  insulin glargine Injectable (LANTUS) 20 Unit(s) SubCutaneous at bedtime  insulin lispro (HumaLOG) corrective regimen sliding scale   SubCutaneous three times a day before meals  insulin lispro (HumaLOG) corrective regimen sliding scale   SubCutaneous at bedtime  lactulose Syrup 30 Gram(s) Oral every 4 hours  midodrine. 20 milliGRAM(s) Oral three times a day  pantoprazole    Tablet 40 milliGRAM(s) Oral before breakfast  rifAXIMin 550 milliGRAM(s) Oral two times a day  sucralfate 1 Gram(s) Oral two times a day  tamsulosin 0.4 milliGRAM(s) Oral at bedtime    MEDICATIONS  (PRN):  dextrose 40% Gel 15 Gram(s) Oral once PRN Blood Glucose LESS THAN 70 milliGRAM(s)/deciliter  glucagon  Injectable 1 milliGRAM(s) IntraMuscular once PRN Glucose LESS THAN 70 milligrams/deciliter      Allergies    codeine (Anaphylaxis)    Intolerances    NO  RED MEAT (Unknown)      Vital Signs Last 24 Hrs  T(C): 36.8 (06 Dec 2019 14:35), Max: 36.8 (05 Dec 2019 21:28)  T(F): 98.2 (06 Dec 2019 14:35), Max: 98.2 (05 Dec 2019 21:28)  HR: 75 (06 Dec 2019 04:19) (75 - 85)  BP: 106/62 (06 Dec 2019 04:19) (106/62 - 139/69)  BP(mean): --  RR: 18 (06 Dec 2019 14:35) (17 - 18)  SpO2: 98% (06 Dec 2019 14:35) (96% - 100%)    PHYSICAL EXAM  General: obese male, looks chronically Ill  HEENT: + Icterus  Neck: supple  CV: normal S1/S2 RRR  Lungs: diminished at bases but clear  Abdomen: soft non-tender non-distended, no hepatosplenomegaly  Ext: + edema     LABS:                          9.2    5.93  )-----------( 46       ( 06 Dec 2019 07:44 )             26.3     Serial CBC's  12-06 @ 07:44  Hct-26.3 / Hgb-9.2 / Plat-46 / RBC-2.70 / WBC-5.93          Serial CBC's  12-05 @ 06:59  Hct-27.2 / Hgb-9.6 / Plat-47 / RBC-2.81 / WBC-7.21            12-06    132<L>  |  92<L>  |  35<H>  ----------------------------<  153<H>  3.5   |  26  |  1.64<H>    Ca    10.9<H>      06 Dec 2019 06:25    TPro  6.6  /  Alb  3.9  /  TBili  8.2<H>  /  DBili  x   /  AST  26  /  ALT  18  /  AlkPhos  116  12-06      PT/INR - ( 06 Dec 2019 09:35 )   PT: 25.6 sec;   INR: 2.21 ratio             Ferritin, Serum: 568 ng/mL (12-06 @ 09:37)  Vitamin B12, Serum: 1652 pg/mL (12-06 @ 09:37)            RADIOLOGY & ADDITIONAL STUDIES:

## 2019-12-06 NOTE — PROGRESS NOTE ADULT - PROBLEM SELECTOR PLAN 2
- recent EGD 5/2019 non-bleeding duodenal ulcers and No evidence of varices   - cirrhosis 2/2 NAFLD   - US Abd without ascites; CT abd without new gi pathology; no liver lesions  - continue xifaxan and lactulose as above   - Protonix 40mg PO QD   - heme following to r/o hemolysis given increased indirect bili; care appreciated   - liver transplant evaluation in progress; appreciate hepatology input

## 2019-12-06 NOTE — PROGRESS NOTE ADULT - SUBJECTIVE AND OBJECTIVE BOX
HPI:  Patient seen and examined at bedside on 4 DSU with his wife and son present.  No longer encephalopathic.    Review Of Systems:           Respiratory: No shortness of breath, cough, or wheezing  Cardiovascular: No chest pain or palpitations  10 point review of systems is otherwise negative except as mentioned above        Medications:  atorvastatin 10 milliGRAM(s) Oral at bedtime  cefTRIAXone   IVPB 1000 milliGRAM(s) IV Intermittent every 24 hours  dextrose 40% Gel 15 Gram(s) Oral once PRN  dextrose 5%. 1000 milliLiter(s) IV Continuous <Continuous>  dextrose 50% Injectable 12.5 Gram(s) IV Push once  dextrose 50% Injectable 25 Gram(s) IV Push once  dextrose 50% Injectable 25 Gram(s) IV Push once  glucagon  Injectable 1 milliGRAM(s) IntraMuscular once PRN  insulin glargine Injectable (LANTUS) 20 Unit(s) SubCutaneous at bedtime  insulin lispro (HumaLOG) corrective regimen sliding scale   SubCutaneous three times a day before meals  insulin lispro (HumaLOG) corrective regimen sliding scale   SubCutaneous at bedtime  lactulose Syrup 30 Gram(s) Oral every 4 hours  midodrine. 20 milliGRAM(s) Oral three times a day  pantoprazole    Tablet 40 milliGRAM(s) Oral before breakfast  rifAXIMin 550 milliGRAM(s) Oral two times a day  sucralfate 1 Gram(s) Oral two times a day  tamsulosin 0.4 milliGRAM(s) Oral at bedtime    PAST MEDICAL & SURGICAL HISTORY:  GIB (gastrointestinal bleeding)  GERD with esophagitis: Gastritis &amp; Non Bleeding Ulcers  Hepatic encephalopathy  Obesity  Fatty liver disease, nonalcoholic  Renal stones: 25 years ago  Hypertension  Neuropathy  Hypercholesteremia  Diabetes  S/P cholecystectomy    Vitals:  T(C): 36.8 (12-06-19 @ 14:35), Max: 36.8 (12-05-19 @ 21:28)  HR: 75 (12-06-19 @ 04:19) (75 - 85)  BP: 106/62 (12-06-19 @ 04:19) (106/62 - 139/69)  BP(mean): --  RR: 18 (12-06-19 @ 14:35) (17 - 18)  SpO2: 98% (12-06-19 @ 14:35) (96% - 100%)  Wt(kg): --  Daily     Daily   I&O's Summary    05 Dec 2019 07:01  -  06 Dec 2019 07:00  --------------------------------------------------------  IN: 400 mL / OUT: 980 mL / NET: -580 mL    06 Dec 2019 07:01  -  06 Dec 2019 19:08  --------------------------------------------------------  IN: 0 mL / OUT: 350 mL / NET: -350 mL        Physical Exam:  Appearance: Obese; no acute distress; well appearing  Eyes: PERRL, EOMI, pink conjunctiva  HENT: Normal oral mucosa  Cardiovascular: RRR, S1, S2, no murmurs, rubs, or gallops; no edema; no JVD  Procedural access site (right groin): clean, dry, intact, without hematoma or ecchymoses  Respiratory: Clear to auscultation bilaterally  Gastrointestinal: soft, non-tender, non-distended with normal bowel sounds  Musculoskeletal: No clubbing; no joint deformity   Neurologic: Non-focal  Lymphatic: No lymphadenopathy  Psychiatry: AAOx3, mood & affect appropriate  Skin: No rashes, ecchymoses, or cyanosis                          9.2    5.93  )-----------( 46       ( 06 Dec 2019 07:44 )             26.3     12-06    132<L>  |  92<L>  |  35<H>  ----------------------------<  153<H>  3.5   |  26  |  1.64<H>    Ca    10.9<H>      06 Dec 2019 06:25    TPro  6.6  /  Alb  3.9  /  TBili  8.2<H>  /  DBili  x   /  AST  26  /  ALT  18  /  AlkPhos  116  12-06    PT/INR - ( 06 Dec 2019 09:35 )   PT: 25.6 sec;   INR: 2.21 ratio

## 2019-12-06 NOTE — PROGRESS NOTE ADULT - ASSESSMENT
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    #Presumed JEAN cirrhosis, MELD-Na 12/5 23 (improving Cr)      - varices: none on EGD outside in 10/2019      - HE: present, on lactulose and Xifaxan       - ascites: none currently, but history of ascites with SBP      - HCC: none on US 11/27, AFP 23 in 10/19      - Vascular assessment: poor US doppler exam for eval of vessels due to body habitus       - Liver Workup: HAV immune, HBV non-immune, HCV neg, HIV neg, iron studies consistent wiith AoCD, RPR neg, QG neg, CMV neg, EBV IgG pos only, HSV1 pos, ASMA neg, AMA neg, ceruloplasmin LOW 12      - Age appropriate screening: colonoscopy 5-6 months ago per patient      - Dental evaluation: >1 year ago, no loose teeth per patient      - Cardiac evaluation: LHC completed 12/5, mild-mod non-occlusive CAD with low filling pressures, no interventions needed       - Blood type A+, Ab neg      - Previous transplant workup: none      - Social: retired, previous , lives in [place] with wife and son, previously highly functional (before October), Insurance -Medicare HMO  # MISA, Cr improving, likely pre-renal  # Chest wall hematoma  # T2DM  # Hypertension (currently normotensive)    Recs:  - would obtain urine culture given dirty UA   - continue empiric Urinary tract infection treatment with CFX  - continue lactulose 3-4 times daily and Xifaxan BID for HE  - fractionate bilirubin again  - obtain 24 hour urine copper given low ceruloplasmin (r/o Nnamdi's as etiology for cirrhosis)  - continue with q8 hour IV albumin doses as patient Cr improving  - continue with midodrine 20mg TID  - continue to hold diuretics  - obtain MRI with and without contrast of abdomen to better evaluate liver parenchyma (when patient mentating better/able to lie still)  - PT evaluation for rehab needs   - obtain outpatient colonoscopy report  - patient to be discussed at transplant evaluation committee     - patient can likely be discharged with outpatient hepatology followup once MRI complete/ patient's PT needs determined

## 2019-12-06 NOTE — PROGRESS NOTE ADULT - SUBJECTIVE AND OBJECTIVE BOX
INTERVAL HPI/OVERNIGHT EVENTS:    pt without gi complaints this morning   having bowel movements  mentating better  family at bedside     MEDICATIONS  (STANDING):  atorvastatin 10 milliGRAM(s) Oral at bedtime  cefTRIAXone   IVPB 1000 milliGRAM(s) IV Intermittent every 24 hours  dextrose 5%. 1000 milliLiter(s) (50 mL/Hr) IV Continuous <Continuous>  dextrose 50% Injectable 12.5 Gram(s) IV Push once  dextrose 50% Injectable 25 Gram(s) IV Push once  dextrose 50% Injectable 25 Gram(s) IV Push once  insulin glargine Injectable (LANTUS) 20 Unit(s) SubCutaneous at bedtime  insulin lispro (HumaLOG) corrective regimen sliding scale   SubCutaneous three times a day before meals  insulin lispro (HumaLOG) corrective regimen sliding scale   SubCutaneous at bedtime  lactulose Syrup 30 Gram(s) Oral every 4 hours  midodrine. 20 milliGRAM(s) Oral three times a day  pantoprazole    Tablet 40 milliGRAM(s) Oral before breakfast  rifAXIMin 550 milliGRAM(s) Oral two times a day  sucralfate 1 Gram(s) Oral two times a day  tamsulosin 0.4 milliGRAM(s) Oral at bedtime    MEDICATIONS  (PRN):  dextrose 40% Gel 15 Gram(s) Oral once PRN Blood Glucose LESS THAN 70 milliGRAM(s)/deciliter  glucagon  Injectable 1 milliGRAM(s) IntraMuscular once PRN Glucose LESS THAN 70 milligrams/deciliter      Allergies    codeine (Anaphylaxis)    Intolerances    NO  RED MEAT (Unknown)      Review of Systems:    General:  No wt loss, fevers, chills, night sweats, fatigue   Eyes:  Good vision, no reported pain  ENT:  No sore throat, pain, runny nose, dysphagia  CV:  No pain, palpitations, hypo/hypertension  Resp:  No dyspnea, cough, tachypnea, wheezing  GI:  No pain, No nausea, No vomiting, No diarrhea, No constipation, No weight loss, No fever, No pruritis, No rectal bleeding, No melena, No dysphagia  :  No pain, bleeding, incontinence, nocturia  Muscle:  No pain, weakness  Neuro:  No weakness, tingling, memory problems  Psych:  No fatigue, insomnia, mood problems, depression  Endocrine:  No polyuria, polydypsia, cold/heat intolerance  Heme:  No petechiae, ecchymosis, easy bruisability  Skin:  No rash, tattoos, scars, edema      Vital Signs Last 24 Hrs  T(C): 36.7 (06 Dec 2019 04:19), Max: 37 (05 Dec 2019 13:54)  T(F): 98.1 (06 Dec 2019 04:19), Max: 98.6 (05 Dec 2019 13:54)  HR: 75 (06 Dec 2019 04:19) (75 - 85)  BP: 106/62 (06 Dec 2019 04:19) (106/62 - 139/69)  BP(mean): --  RR: 18 (06 Dec 2019 04:19) (17 - 20)  SpO2: 99% (06 Dec 2019 04:19) (96% - 100%)    PHYSICAL EXAM:    Constitutional: NAD  HEENT: EOMI, throat clear  Neck: No LAD, supple  Respiratory: CTA and P  Cardiovascular: S1 and S2, RRR, no M  Gastrointestinal: BS+, soft, NT/ND, neg HSM,  Extremities: No peripheral edema, neg clubbing, cyanosis  Vascular: 2+ peripheral pulses  Neurological: A/O x 3, no focal deficits  Psychiatric: Normal mood, normal affect  Skin: No rashes +jaundice      LABS:                        9.2    5.93  )-----------( 46       ( 06 Dec 2019 07:44 )             26.3     12-06    132<L>  |  92<L>  |  35<H>  ----------------------------<  153<H>  3.5   |  26  |  1.64<H>    Ca    10.9<H>      06 Dec 2019 06:25    TPro  6.6  /  Alb  3.9  /  TBili  8.2<H>  /  DBili  x   /  AST  26  /  ALT  18  /  AlkPhos  116  12-06    PT/INR - ( 06 Dec 2019 09:35 )   PT: 25.6 sec;   INR: 2.21 ratio           Urinalysis Basic - ( 04 Dec 2019 16:50 )    Color: Yellow / Appearance: Slightly Turbid / S.018 / pH: x  Gluc: x / Ketone: Negative  / Bili: Negative / Urobili: Negative   Blood: x / Protein: 100 / Nitrite: Negative   Leuk Esterase: Large / RBC: 95 /hpf / WBC 93 /HPF   Sq Epi: x / Non Sq Epi: 0 /hpf / Bacteria: Many        RADIOLOGY & ADDITIONAL TESTS:

## 2019-12-06 NOTE — PROGRESS NOTE ADULT - ASSESSMENT
63 year old male with HTN, DM2, non-alcoholic cirrhosis, who presents with altered mental status. We last saw him during a hospitalization in 10/2019, during which he was significantly volume overloaded and required iv diuresis.  He was initially confused though his mental status has improved. He remains with hyponatremia and high ammonia level.    Abnormal EKG  - EKG is notable for prolonged qtc which was still present on 11/30  - avoid qtc prolonging medications  - replete K to greater than 4  - check daily mg as well and replete to greater than 2       Diastolic HF   - no sign of acute ischemia  - no significant volume overload on exam. CT without pleural effusions or edema, though notable for possible chest wall hematoma.  - can hold diuretics.   - GI and renal follow up.  - TTE normal LV systolic function EF 65%; LVH, DD Grade I  - cath with normal filling pressures and no significant cad    HTN  - BP on softer side, though has been better overall  - hold diuretics and anti-hypertensives  - midodrine as needed    - cont statin for cad/atherosclerosis noted on ct  - lfts remain within normal limits    - All other workup as per primary team  - Will follow with you. Further cardiac w/u as indicated by clinical course

## 2019-12-06 NOTE — PROGRESS NOTE ADULT - PROBLEM SELECTOR PLAN 1
possible due to prerenal azotemia ,  increase water intake , low salt ,  gfr is controlled   serum creatinine is 1.64   , approximating GFR is increased    ml/min.   There is  progression . No uremic symptoms    pos evidence of anemia .  Fluid status stable.  Will continue to avoid nephrotoxic drugs.  Patient remains asymptomatic.   Continue current therapy.  hold diuresis hold arb   catie acei.

## 2019-12-06 NOTE — PROGRESS NOTE ADULT - ASSESSMENT
63 year-old with multiple cardiovascular risk factors presents with hepatic encephalopathy.  Echo with normal LV systolic function, mild pulmonary hypertension.  Cardiac cath with normal filling pressures, normal PA pressures, and without severe coronary artery disease.    No further cardiovascular work-up is necessary prior to consideration for liver transplant.  Would repeat ECG and monitor QTc. Avoid QT prolonging agents where possible.

## 2019-12-06 NOTE — PROGRESS NOTE ADULT - ASSESSMENT
pt with above history p/w difficulty ambulation / weakness       - flap tremor + on Exam , likely hepatic encephalopathy -  pt having bms after lactulose enema, monitor mental status closely , appreciate Hepatology consult input ,     - hyperbilirubinemia-GI f/u , cont rifaximin     - ARF on ckd stage 3 - likely hepatorenal , avoid nephrotoxic agents bladder scan to r/o retention , renal f/u - pt on aldactone+ Lasix+ zaroxolyn - pt doesn't appear fluid overloaded at present, hold diuretics, gentle iv fluids with close monitor of pts volume status    - dm2 - iss     - hyponatremia- renal f/u     - Thrombocytopenia - likely sec to liver pathology     - coagulopathy - likely sec to liver pathology , monitor closely for active signs of bleeding , vit k     - HLD - pt om parvastaTIN     had extensive lengthy discussion with pts wife /sons about pts current clinical status, management plan &  prognosis - guarded prognosis - palliative eval    ppi

## 2019-12-06 NOTE — PROGRESS NOTE ADULT - ASSESSMENT
1. Chronic Normocytic Anemia    -- H/H stable  -- Undetectable Hapto sec to Liver Disease  -- retic low, f/u LDH, Direct Karen and Urine Hemosiderin. Doubt hemolyzing  -- Iron Studies c/w Anemia of Chronic Inflammation  -- No B12/Folate Deficiency  -- FOBT pos in the past  -- monitor Counts    2. IgG Kappa MGUS(Low Risk)    -- M spike only 200mg/dL  -- f/u SFLC    3. cirrhosis   -- per GI and hepatology       4. thrombocytopenia sec to cirrhosis     -- platelet count stable  -- trend    Magda Lamar MD  [phone]

## 2019-12-06 NOTE — PROGRESS NOTE ADULT - ATTENDING COMMENTS
Hepatology Staff: Yomi Medina MD    I saw and examined the patient along with  Dr. Del [id number] @ 09:25.    Patient Medical Record, hospital course was reviewed and summarized as below:    Hemodynamic Status: stable  Vitals: Vital Signs Last 24 Hrs  T(C): 36.7 (06 Dec 2019 04:19), Max: 37 (05 Dec 2019 13:54)  T(F): 98.1 (06 Dec 2019 04:19), Max: 98.6 (05 Dec 2019 13:54)  HR: 75 (06 Dec 2019 04:19) (75 - 85)  BP: 106/62 (06 Dec 2019 04:19) (106/62 - 139/69)  RR: 18 (06 Dec 2019 04:19) (17 - 20)  SpO2: 99% (06 Dec 2019 04:19) (96% - 100%)    Antibiotics:cefTRIAXone   IVPB 1000 milliGRAM(s) IV Intermittent every 24 hours    Diuretics:None  Labs:Creatinine, Serum: 1.64 mg/dL (12-06-19 @ 06:25)  Bilirubin Total, Serum: 8.2 mg/dL (12-06-19 @ 06:25)    I/O: I&O's Summary    05 Dec 2019 07:01  -  06 Dec 2019 07:00  --------------------------------------------------------  IN: 400 mL / OUT: 980 mL / NET: -580 mL      Nutritional Status:   Albumin, Serum: 3.9 g/dL (12-06-19 @ 06:25)    Last 24 hour events: Pt had a cardiac cath (prelim: no significant pulm htn, mild to moderate CAD)    Recommendations: Follow up urine cx. Keep on IV Ceftriaxone. Keep on Midodrine, IV Albumin, Lactulose, and Rifaximine. Complete transplant evaluation.    Plan discussed with Primary team.

## 2019-12-06 NOTE — PROGRESS NOTE ADULT - SUBJECTIVE AND OBJECTIVE BOX
SUBJECTIVE / OVERNIGHT EVENTS: pt denies headache, nausea,v   chest pain, shortness of breath more confused than yesterday   + bms    MEDICATIONS  (STANDING):  atorvastatin 10 milliGRAM(s) Oral at bedtime  cefTRIAXone   IVPB 1000 milliGRAM(s) IV Intermittent every 24 hours  dextrose 5%. 1000 milliLiter(s) (50 mL/Hr) IV Continuous <Continuous>  dextrose 50% Injectable 12.5 Gram(s) IV Push once  dextrose 50% Injectable 25 Gram(s) IV Push once  dextrose 50% Injectable 25 Gram(s) IV Push once  insulin glargine Injectable (LANTUS) 20 Unit(s) SubCutaneous at bedtime  insulin lispro (HumaLOG) corrective regimen sliding scale   SubCutaneous three times a day before meals  insulin lispro (HumaLOG) corrective regimen sliding scale   SubCutaneous at bedtime  lactulose Syrup 30 Gram(s) Oral every 4 hours  midodrine. 20 milliGRAM(s) Oral three times a day  pantoprazole    Tablet 40 milliGRAM(s) Oral before breakfast  rifAXIMin 550 milliGRAM(s) Oral two times a day  sucralfate 1 Gram(s) Oral two times a day  tamsulosin 0.4 milliGRAM(s) Oral at bedtime    MEDICATIONS  (PRN):  dextrose 40% Gel 15 Gram(s) Oral once PRN Blood Glucose LESS THAN 70 milliGRAM(s)/deciliter  glucagon  Injectable 1 milliGRAM(s) IntraMuscular once PRN Glucose LESS THAN 70 milligrams/deciliter    Vital Signs Last 24 Hrs  T(C): 36.5 (06 Dec 2019 21:32), Max: 36.8 (06 Dec 2019 14:35)  T(F): 97.7 (06 Dec 2019 21:32), Max: 98.2 (06 Dec 2019 14:35)  HR: 85 (06 Dec 2019 21:32) (75 - 85)  BP: 118/70 (06 Dec 2019 21:32) (106/62 - 118/70)  BP(mean): --  RR: 17 (06 Dec 2019 21:32) (17 - 18)  SpO2: 99% (06 Dec 2019 21:32) (98% - 99%)    Constitutional: No fever, fatigue  Skin: No rash.  Eyes: No recent vision problems or eye pain.  ENT: No congestion, ear pain, or sore throat.  Cardiovascular: No chest pain or palpation.  Respiratory: No cough, shortness of breath, congestion, or wheezing.  Gastrointestinal: No abdominal pain, nausea, vomiting, or diarrhea.  Genitourinary: No dysuria.  Musculoskeletal: No joint swelling.  Neurologic: No headache.    PHYSICAL EXAM:  GENERAL: NAD  EYES: EOMI, PERRLA  NECK: Supple, No JVD  CHEST/LUNG: dec breath sounds at bases   HEART:  S1 , S2 +  ABDOMEN: soft , bs+, mild distension + , no tenderness  EXTREMITIES:  no edema  NEUROLOGY:alert awake oriented   flap tremor+    LABS:      132<L>  |  92<L>  |  35<H>  ----------------------------<  153<H>  3.5   |  26  |  1.64<H>    Ca    10.9<H>      06 Dec 2019 06:25    TPro  6.6  /  Alb  3.9  /  TBili  8.2<H>  /  DBili      /  AST  26  /  ALT  18  /  AlkPhos  116      Creatinine Trend: 1.64 <--, 1.70 <--, 1.74 <--, 1.99 <--, 1.96 <--, 2.22 <--, 2.49 <--, 2.47 <--, 2.50 <--, 2.29 <--                        9.2    5.93  )-----------( 46       ( 06 Dec 2019 07:44 )             26.3     Urine Studies:  Urinalysis Basic - ( 04 Dec 2019 16:50 )    Color: Yellow / Appearance: Slightly Turbid / S.018 / pH:   Gluc:  / Ketone: Negative  / Bili: Negative / Urobili: Negative   Blood:  / Protein: 100 / Nitrite: Negative   Leuk Esterase: Large / RBC: 95 /hpf / WBC 93 /HPF   Sq Epi:  / Non Sq Epi: 0 /hpf / Bacteria: Many      Creatinine, Random Urine: 118 mg/dL ( @ 03:54)  Protein/Creatinine Ratio Calculation: 0.9 Ratio ( @ 03:54)  Creatinine, Random Urine: 89 mg/dL ( @ 20:18)  Protein/Creatinine Ratio Calculation: 0.6 Ratio ( @ 20:18)  Sodium, Random Urine: <35 mmol/L ( @ 15:18)  Creatinine, Random Urine: 99 mg/dL ( @ 15:18)          LIVER FUNCTIONS - ( 06 Dec 2019 06:25 )  Alb: 3.9 g/dL / Pro: 6.6 g/dL / ALK PHOS: 116 U/L / ALT: 18 U/L / AST: 26 U/L / GGT: x           PT/INR - ( 06 Dec 2019 09:35 )   PT: 25.6 sec;   INR: 2.21 ratio                      9.6    7.21  )-----------( 47       ( 05 Dec 2019 06:59 )             27.2     Urine Studies:  Urinalysis Basic - ( 04 Dec 2019 16:50 )    Color: Yellow / Appearance: Slightly Turbid / S.018 / pH:   Gluc:  / Ketone: Negative  / Bili: Negative / Urobili: Negative   Blood:  / Protein: 100 / Nitrite: Negative   Leuk Esterase: Large / RBC: 95 /hpf / WBC 93 /HPF   Sq Epi:  / Non Sq Epi: 0 /hpf / Bacteria: Many      Creatinine, Random Urine: 118 mg/dL ( @ 03:54)  Protein/Creatinine Ratio Calculation: 0.9 Ratio ( @ 03:54)  Creatinine, Random Urine: 89 mg/dL ( @ 20:18)  Protein/Creatinine Ratio Calculation: 0.6 Ratio ( @ 20:18)  Sodium, Random Urine: <35 mmol/L ( @ 15:18)  Creatinine, Random Urine: 99 mg/dL ( @ 15:18)          LIVER FUNCTIONS - ( 05 Dec 2019 06:58 )  Alb: 4.0 g/dL / Pro: 6.8 g/dL / ALK PHOS: 107 U/L / ALT: 18 U/L / AST: 28 U/L / GGT: x           PT/INR - ( 05 Dec 2019 07:03 )   PT: 24.7 sec;   INR: 2.10 ratio         PTT - ( 04 Dec 2019 08:45 )  PTT:37.7 sec  Color: Yellow / Appearance: Slightly Turbid / S.018 / pH:   Gluc:  / Ketone: Negative  / Bili: Negative / Urobili: Negative   Blood:  / Protein: 100 / Nitrite: Negative   Leuk Esterase: Large / RBC: 95 /hpf / WBC 93 /HPF   Sq Epi:  / Non Sq Epi: 0 /hpf / Bacteria: Many      Creatinine, Random Urine: 89 mg/dL ( @ 20:18)  Protein/Creatinine Ratio Calculation: 0.6 Ratio ( @ 20:18)  Sodium, Random Urine: <35 mmol/L ( @ 15:18)  Creatinine, Random Urine: 99 mg/dL ( @ 15:18)          LIVER FUNCTIONS - ( 04 Dec 2019 18:43 )  Alb: 4.0 g/dL / Pro: 6.6 g/dL / ALK PHOS: 111 U/L / ALT: 18 U/L / AST: 27 U/L / GGT: x           PT/INR - ( 04 Dec 2019 08:45 )   PT: 23.1 sec;   INR: 1.97 ratio         PTT - ( 04 Dec 2019 08:45 )  PTT:37.7 sec  Osmolality, Random Urine: 335 mosm/Kg ( @ 00:20)  Creatinine, Random Urine: 67 mg/dL ( @ 23:21)  Creatinine, Random Urine: 68 mg/dL ( @ 21:19)  Protein/Creatinine Ratio Calculation: 0.2 Ratio ( 21:19)  Potassium, Random Urine: 37 mmol/L ( 21:19)  Sodium, Random Urine: 61 mmol/L ( @ 21:19)          LIVER FUNCTIONS - ( 01 Dec 2019 06:13 )  Alb: 4.0 g/dL / Pro: 6.9 g/dL / ALK PHOS: 123 U/L / ALT: 21 U/L / AST: 35 U/L / GGT: x           PT/INR - ( 01 Dec 2019 06:13 )   PT: 22.5 sec;   INR: 1.92 ratio         PTT - ( 2019 07:09 )  PTT:41.6 sec  Osmolality, Random Urine: 335 mosm/Kg ( @ 00:20)  Creatinine, Random Urine: 67 mg/dL ( @ 23:21)  Creatinine, Random Urine: 68 mg/dL ( @ 21:19)  Protein/Creatinine Ratio Calculation: 0.2 Ratio ( @ 21:19)  Potassium, Random Urine: 37 mmol/L ( @ 21:19)  Sodium, Random Urine: 61 mmol/L ( @ 21:19)          LIVER FUNCTIONS - ( 2019 07:09 )  Alb: 4.0 g/dL / Pro: 6.7 g/dL / ALK PHOS: 121 U/L / ALT: 21 U/L / AST: 31 U/L / GGT: x           PT/INR - ( 2019 07:09 )   PT: 23.5 sec;   INR: 2.02 ratio         PTT - ( 2019 07:09 )  PTT:41.6 sec       PTT - ( 2019 08:22 )  PTT:37.6 sec  Color: Yellow / Appearance: Slightly Turbid / S.013 / pH:   Gluc:  / Ketone: Negative  / Bili: Negative / Urobili: <2 mg/dL   Blood:  / Protein: Trace / Nitrite: Negative   Leuk Esterase: Negative / RBC: 100 /HPF / WBC 4 /HPF   Sq Epi:  / Non Sq Epi: 0 /HPF / Bacteria: Negative      Osmolality, Random Urine: 335 mosm/Kg ( @ 00:20)  Creatinine, Random Urine: 67 mg/dL ( @ 23:21)  Creatinine, Random Urine: 68 mg/dL ( @ 21:19)  Protein/Creatinine Ratio Calculation: 0.2 Ratio ( @ 21:19)  Potassium, Random Urine: 37 mmol/L ( @ 21:19)  Sodium, Random Urine: 61 mmol/L ( @ 21:19)          LIVER FUNCTIONS - ( 2019 06:29 )  Alb: 3.0 g/dL / Pro: 6.9 g/dL / ALK PHOS: 182 U/L / ALT: 23 U/L / AST: 36 U/L / GGT: x           PT/INR - ( 2019 08:38 )   PT: 21.8 sec;   INR: 1.88 ratio         PTT - ( 2019 08:38 )  PTT:36.8 sec  Blood: x / Protein: Negative / Nitrite: Negative   Leuk Esterase: Negative / RBC: 14 /HPF / WBC 4 /HPF   Sq Epi: x / Non Sq Epi: 0 /HPF / Bacteria: Negative        RADIOLOGY & ADDITIONAL TESTS:    Imaging Personally Reviewed:    Consultant(s) Notes Reviewed:      Care Discussed with Consultants/Other Providers:

## 2019-12-06 NOTE — PROGRESS NOTE ADULT - SUBJECTIVE AND OBJECTIVE BOX
Patient is a 63y Male whom presented to the hospital with ckd and dena   pateint seen and examined nad , no fever , no chills      PAST MEDICAL & SURGICAL HISTORY:  GIB (gastrointestinal bleeding)  GERD with esophagitis: Gastritis &amp; Non Bleeding Ulcers  Hepatic encephalopathy  Obesity  Fatty liver disease, nonalcoholic  Renal stones: 25 years ago  Hypertension  Neuropathy  Hypercholesteremia  Diabetes  S/P cholecystectomy      MEDICATIONS  (STANDING):  dextrose 5%. 1000 milliLiter(s) (50 mL/Hr) IV Continuous <Continuous>  dextrose 50% Injectable 12.5 Gram(s) IV Push once  dextrose 50% Injectable 25 Gram(s) IV Push once  dextrose 50% Injectable 25 Gram(s) IV Push once  insulin lispro (HumaLOG) corrective regimen sliding scale   SubCutaneous three times a day before meals  insulin lispro (HumaLOG) corrective regimen sliding scale   SubCutaneous at bedtime      Allergies    codeine (Anaphylaxis)    Intolerances    NO  RED MEAT (Unknown)                                     SOCIAL HISTORY:  Denies ETOh,Smoking,     FAMILY HISTORY:  Family history of type 2 diabetes mellitus  Family history of hypertension  Family history of stomach cancer      REVIEW OF SYSTEMS:    CONSTITUTIONAL: No weakness, fevers or chills  RESPIRATORY: No cough, wheezing, hemoptysis; No shortness of breath  CARDIOVASCULAR: No chest pain or palpitations  GASTROINTESTINAL: No abdominal or epigastric pain. No nausea, vomiting,     No diarrhea or constipation. No melena   GENITOURINARY: No dysuria, frequency or hematuria  NEUROLOGICAL: No numbness or weakness  SKIN: dry                           PHYSICAL EXAM:    Constitutional: NAD  HEENT: conjunctive   clear   Neck:  No JVD  Respiratory: CTAB  Cardiovascular: S1 and S2  Gastrointestinal: BS+, soft, NT/ND  Extremities: No peripheral edema  Neurological:  no focal deficits  Psychiatric: Normal mood, normal affect  Skin: dry   Access: Not applicable                                              9.2    5.93  )-----------( 46       ( 06 Dec 2019 07:44 )             26.3       CBC Full  -  ( 06 Dec 2019 07:44 )  WBC Count : 5.93 K/uL  RBC Count : 2.70 M/uL  Hemoglobin : 9.2 g/dL  Hematocrit : 26.3 %  Platelet Count - Automated : 46 K/uL  Mean Cell Volume : 97.4 fl  Mean Cell Hemoglobin : 34.1 pg  Mean Cell Hemoglobin Concentration : 35.0 gm/dL  Auto Neutrophil # : 3.60 K/uL  Auto Lymphocyte # : 1.23 K/uL  Auto Monocyte # : 0.87 K/uL  Auto Eosinophil # : 0.16 K/uL  Auto Basophil # : 0.05 K/uL  Auto Neutrophil % : 60.8 %  Auto Lymphocyte % : 20.7 %  Auto Monocyte % : 14.7 %  Auto Eosinophil % : 2.7 %  Auto Basophil % : 0.8 %          132<L>  |  92<L>  |  35<H>  ----------------------------<  153<H>  3.5   |  26  |  1.64<H>    Ca    10.9<H>      06 Dec 2019 06:25    TPro  6.6  /  Alb  3.9  /  TBili  8.2<H>  /  DBili  x   /  AST  26  /  ALT  18  /  AlkPhos  116        CAPILLARY BLOOD GLUCOSE      POCT Blood Glucose.: 140 mg/dL (06 Dec 2019 12:21)  POCT Blood Glucose.: 163 mg/dL (06 Dec 2019 08:25)  POCT Blood Glucose.: 216 mg/dL (05 Dec 2019 22:14)  POCT Blood Glucose.: 259 mg/dL (05 Dec 2019 19:21)      Vital Signs Last 24 Hrs  T(C): 36.8 (06 Dec 2019 14:35), Max: 36.8 (05 Dec 2019 21:28)  T(F): 98.2 (06 Dec 2019 14:35), Max: 98.2 (05 Dec 2019 21:28)  HR: 75 (06 Dec 2019 04:19) (75 - 85)  BP: 106/62 (06 Dec 2019 04:19) (106/62 - 139/69)  BP(mean): --  RR: 18 (06 Dec 2019 14:35) (17 - 18)  SpO2: 98% (06 Dec 2019 14:35) (96% - 100%)    Urinalysis Basic - ( 04 Dec 2019 16:50 )    Color: Yellow / Appearance: Slightly Turbid / S.018 / pH: x  Gluc: x / Ketone: Negative  / Bili: Negative / Urobili: Negative   Blood: x / Protein: 100 / Nitrite: Negative   Leuk Esterase: Large / RBC: 95 /hpf / WBC 93 /HPF   Sq Epi: x / Non Sq Epi: 0 /hpf / Bacteria: Many        PT/INR - ( 06 Dec 2019 09:35 )   PT: 25.6 sec;   INR: 2.21 ratio

## 2019-12-07 LAB
ALBUMIN SERPL ELPH-MCNC: 3.7 G/DL — SIGNIFICANT CHANGE UP (ref 3.3–5)
ALP SERPL-CCNC: 138 U/L — HIGH (ref 40–120)
ALT FLD-CCNC: 16 U/L — SIGNIFICANT CHANGE UP (ref 10–45)
ANION GAP SERPL CALC-SCNC: 13 MMOL/L — SIGNIFICANT CHANGE UP (ref 5–17)
APTT BLD: 44.1 SEC — HIGH (ref 27.5–36.3)
AST SERPL-CCNC: 26 U/L — SIGNIFICANT CHANGE UP (ref 10–40)
BASOPHILS # BLD AUTO: 0.04 K/UL — SIGNIFICANT CHANGE UP (ref 0–0.2)
BASOPHILS NFR BLD AUTO: 0.7 % — SIGNIFICANT CHANGE UP (ref 0–2)
BILIRUB SERPL-MCNC: 7.4 MG/DL — HIGH (ref 0.2–1.2)
BLD GP AB SCN SERPL QL: NEGATIVE — SIGNIFICANT CHANGE UP
BUN SERPL-MCNC: 38 MG/DL — HIGH (ref 7–23)
CALCIUM SERPL-MCNC: 10.6 MG/DL — HIGH (ref 8.4–10.5)
CHLORIDE SERPL-SCNC: 92 MMOL/L — LOW (ref 96–108)
CO2 SERPL-SCNC: 24 MMOL/L — SIGNIFICANT CHANGE UP (ref 22–31)
CREAT SERPL-MCNC: 1.88 MG/DL — HIGH (ref 0.5–1.3)
DAT POLY-SP REAG RBC QL: NEGATIVE — SIGNIFICANT CHANGE UP
EOSINOPHIL # BLD AUTO: 0.12 K/UL — SIGNIFICANT CHANGE UP (ref 0–0.5)
EOSINOPHIL NFR BLD AUTO: 2.2 % — SIGNIFICANT CHANGE UP (ref 0–6)
GLUCOSE BLDC GLUCOMTR-MCNC: 221 MG/DL — HIGH (ref 70–99)
GLUCOSE BLDC GLUCOMTR-MCNC: 243 MG/DL — HIGH (ref 70–99)
GLUCOSE BLDC GLUCOMTR-MCNC: 255 MG/DL — HIGH (ref 70–99)
GLUCOSE BLDC GLUCOMTR-MCNC: 273 MG/DL — HIGH (ref 70–99)
GLUCOSE SERPL-MCNC: 226 MG/DL — HIGH (ref 70–99)
HCT VFR BLD CALC: 26.1 % — LOW (ref 39–50)
HGB BLD-MCNC: 9 G/DL — LOW (ref 13–17)
IMM GRANULOCYTES NFR BLD AUTO: 0.4 % — SIGNIFICANT CHANGE UP (ref 0–1.5)
INR BLD: 2.05 RATIO — HIGH (ref 0.88–1.16)
LDH SERPL L TO P-CCNC: 222 U/L — SIGNIFICANT CHANGE UP (ref 50–242)
LYMPHOCYTES # BLD AUTO: 0.76 K/UL — LOW (ref 1–3.3)
LYMPHOCYTES # BLD AUTO: 13.9 % — SIGNIFICANT CHANGE UP (ref 13–44)
MCHC RBC-ENTMCNC: 33.8 PG — SIGNIFICANT CHANGE UP (ref 27–34)
MCHC RBC-ENTMCNC: 34.5 GM/DL — SIGNIFICANT CHANGE UP (ref 32–36)
MCV RBC AUTO: 98.1 FL — SIGNIFICANT CHANGE UP (ref 80–100)
MONOCYTES # BLD AUTO: 0.85 K/UL — SIGNIFICANT CHANGE UP (ref 0–0.9)
MONOCYTES NFR BLD AUTO: 15.5 % — HIGH (ref 2–14)
NEUTROPHILS # BLD AUTO: 3.68 K/UL — SIGNIFICANT CHANGE UP (ref 1.8–7.4)
NEUTROPHILS NFR BLD AUTO: 67.3 % — SIGNIFICANT CHANGE UP (ref 43–77)
PLATELET # BLD AUTO: 44 K/UL — LOW (ref 150–400)
POTASSIUM SERPL-MCNC: 3.8 MMOL/L — SIGNIFICANT CHANGE UP (ref 3.5–5.3)
POTASSIUM SERPL-SCNC: 3.8 MMOL/L — SIGNIFICANT CHANGE UP (ref 3.5–5.3)
PROT S FREE PPP-ACNC: 35 % NORMAL — LOW (ref 70–150)
PROT SERPL-MCNC: 6.5 G/DL — SIGNIFICANT CHANGE UP (ref 6–8.3)
PROTHROM AB SERPL-ACNC: 23.9 SEC — HIGH (ref 10–13.1)
RBC # BLD: 2.66 M/UL — LOW (ref 4.2–5.8)
RBC # FLD: 18.3 % — HIGH (ref 10.3–14.5)
RH IG SCN BLD-IMP: POSITIVE — SIGNIFICANT CHANGE UP
SODIUM SERPL-SCNC: 129 MMOL/L — LOW (ref 135–145)
SODIUM UR-SCNC: <35 MMOL/L — SIGNIFICANT CHANGE UP
WBC # BLD: 5.47 K/UL — SIGNIFICANT CHANGE UP (ref 3.8–10.5)
WBC # FLD AUTO: 5.47 K/UL — SIGNIFICANT CHANGE UP (ref 3.8–10.5)

## 2019-12-07 PROCEDURE — 99232 SBSQ HOSP IP/OBS MODERATE 35: CPT

## 2019-12-07 PROCEDURE — 99232 SBSQ HOSP IP/OBS MODERATE 35: CPT | Mod: GC

## 2019-12-07 RX ORDER — ALBUMIN HUMAN 25 %
100 VIAL (ML) INTRAVENOUS EVERY 8 HOURS
Refills: 0 | Status: COMPLETED | OUTPATIENT
Start: 2019-12-07 | End: 2019-12-08

## 2019-12-07 RX ORDER — SODIUM CHLORIDE 9 MG/ML
2 INJECTION INTRAMUSCULAR; INTRAVENOUS; SUBCUTANEOUS
Refills: 0 | Status: COMPLETED | OUTPATIENT
Start: 2019-12-07 | End: 2019-12-08

## 2019-12-07 RX ADMIN — PANTOPRAZOLE SODIUM 40 MILLIGRAM(S): 20 TABLET, DELAYED RELEASE ORAL at 05:29

## 2019-12-07 RX ADMIN — LACTULOSE 30 GRAM(S): 10 SOLUTION ORAL at 22:33

## 2019-12-07 RX ADMIN — MIDODRINE HYDROCHLORIDE 20 MILLIGRAM(S): 2.5 TABLET ORAL at 12:23

## 2019-12-07 RX ADMIN — SODIUM CHLORIDE 2 GRAM(S): 9 INJECTION INTRAMUSCULAR; INTRAVENOUS; SUBCUTANEOUS at 23:22

## 2019-12-07 RX ADMIN — Medication 2: at 08:48

## 2019-12-07 RX ADMIN — Medication 50 MILLILITER(S): at 22:53

## 2019-12-07 RX ADMIN — LACTULOSE 30 GRAM(S): 10 SOLUTION ORAL at 09:44

## 2019-12-07 RX ADMIN — LACTULOSE 30 GRAM(S): 10 SOLUTION ORAL at 18:01

## 2019-12-07 RX ADMIN — ATORVASTATIN CALCIUM 10 MILLIGRAM(S): 80 TABLET, FILM COATED ORAL at 22:32

## 2019-12-07 RX ADMIN — INSULIN GLARGINE 20 UNIT(S): 100 INJECTION, SOLUTION SUBCUTANEOUS at 22:32

## 2019-12-07 RX ADMIN — Medication 1 GRAM(S): at 05:29

## 2019-12-07 RX ADMIN — MIDODRINE HYDROCHLORIDE 20 MILLIGRAM(S): 2.5 TABLET ORAL at 18:01

## 2019-12-07 RX ADMIN — Medication 3: at 12:22

## 2019-12-07 RX ADMIN — SODIUM CHLORIDE 2 GRAM(S): 9 INJECTION INTRAMUSCULAR; INTRAVENOUS; SUBCUTANEOUS at 18:01

## 2019-12-07 RX ADMIN — TAMSULOSIN HYDROCHLORIDE 0.4 MILLIGRAM(S): 0.4 CAPSULE ORAL at 22:32

## 2019-12-07 RX ADMIN — CEFTRIAXONE 100 MILLIGRAM(S): 500 INJECTION, POWDER, FOR SOLUTION INTRAMUSCULAR; INTRAVENOUS at 20:42

## 2019-12-07 RX ADMIN — Medication 1 GRAM(S): at 18:01

## 2019-12-07 RX ADMIN — LACTULOSE 30 GRAM(S): 10 SOLUTION ORAL at 05:28

## 2019-12-07 RX ADMIN — Medication 3: at 18:01

## 2019-12-07 RX ADMIN — MIDODRINE HYDROCHLORIDE 20 MILLIGRAM(S): 2.5 TABLET ORAL at 05:29

## 2019-12-07 RX ADMIN — Medication 50 MILLILITER(S): at 14:36

## 2019-12-07 RX ADMIN — LACTULOSE 30 GRAM(S): 10 SOLUTION ORAL at 14:36

## 2019-12-07 NOTE — PROGRESS NOTE ADULT - SUBJECTIVE AND OBJECTIVE BOX
late note entry     SUBJECTIVE / OVERNIGHT EVENTS: pt denies headache, nausea,v   chest pain, shortness of breath more confused than yesterday   + bms    MEDICATIONS  (STANDING):  atorvastatin 10 milliGRAM(s) Oral at bedtime  cefTRIAXone   IVPB 1000 milliGRAM(s) IV Intermittent every 24 hours  dextrose 5%. 1000 milliLiter(s) (50 mL/Hr) IV Continuous <Continuous>  dextrose 50% Injectable 12.5 Gram(s) IV Push once  dextrose 50% Injectable 25 Gram(s) IV Push once  dextrose 50% Injectable 25 Gram(s) IV Push once  insulin glargine Injectable (LANTUS) 20 Unit(s) SubCutaneous at bedtime  insulin lispro (HumaLOG) corrective regimen sliding scale   SubCutaneous three times a day before meals  insulin lispro (HumaLOG) corrective regimen sliding scale   SubCutaneous at bedtime  lactulose Syrup 30 Gram(s) Oral every 4 hours  midodrine. 20 milliGRAM(s) Oral three times a day  pantoprazole    Tablet 40 milliGRAM(s) Oral before breakfast  rifAXIMin 550 milliGRAM(s) Oral two times a day  sucralfate 1 Gram(s) Oral two times a day  tamsulosin 0.4 milliGRAM(s) Oral at bedtime    MEDICATIONS  (PRN):  dextrose 40% Gel 15 Gram(s) Oral once PRN Blood Glucose LESS THAN 70 milliGRAM(s)/deciliter  glucagon  Injectable 1 milliGRAM(s) IntraMuscular once PRN Glucose LESS THAN 70 milligrams/deciliter    Vital Signs Last 24 Hrs  T(C): 36.5 (06 Dec 2019 21:32), Max: 36.8 (06 Dec 2019 14:35)  T(F): 97.7 (06 Dec 2019 21:32), Max: 98.2 (06 Dec 2019 14:35)  HR: 85 (06 Dec 2019 21:32) (75 - 85)  BP: 118/70 (06 Dec 2019 21:32) (106/62 - 118/70)  BP(mean): --  RR: 17 (06 Dec 2019 21:32) (17 - 18)  SpO2: 99% (06 Dec 2019 21:32) (98% - 99%)    Constitutional: No fever, fatigue  Skin: No rash.  Eyes: No recent vision problems or eye pain.  ENT: No congestion, ear pain, or sore throat.  Cardiovascular: No chest pain or palpation.  Respiratory: No cough, shortness of breath, congestion, or wheezing.  Gastrointestinal: No abdominal pain, nausea, vomiting, or diarrhea.  Genitourinary: No dysuria.  Musculoskeletal: No joint swelling.  Neurologic: No headache.    PHYSICAL EXAM:  GENERAL: NAD  EYES: EOMI, PERRLA  NECK: Supple, No JVD  CHEST/LUNG: dec breath sounds at bases   HEART:  S1 , S2 +  ABDOMEN: soft , bs+, mild distension + , no tenderness  EXTREMITIES:  no edema  NEUROLOGY:alert awake oriented   flap tremor+    LABS:      132<L>  |  92<L>  |  35<H>  ----------------------------<  153<H>  3.5   |  26  |  1.64<H>    Ca    10.9<H>      06 Dec 2019 06:25    TPro  6.6  /  Alb  3.9  /  TBili  8.2<H>  /  DBili      /  AST  26  /  ALT  18  /  AlkPhos  116      Creatinine Trend: 1.64 <--, 1.70 <--, 1.74 <--, 1.99 <--, 1.96 <--, 2.22 <--, 2.49 <--, 2.47 <--, 2.50 <--, 2.29 <--                        9.2    5.93  )-----------( 46       ( 06 Dec 2019 07:44 )             26.3     Urine Studies:  Urinalysis Basic - ( 04 Dec 2019 16:50 )    Color: Yellow / Appearance: Slightly Turbid / S.018 / pH:   Gluc:  / Ketone: Negative  / Bili: Negative / Urobili: Negative   Blood:  / Protein: 100 / Nitrite: Negative   Leuk Esterase: Large / RBC: 95 /hpf / WBC 93 /HPF   Sq Epi:  / Non Sq Epi: 0 /hpf / Bacteria: Many      Creatinine, Random Urine: 118 mg/dL ( @ 03:54)  Protein/Creatinine Ratio Calculation: 0.9 Ratio ( @ 03:54)  Creatinine, Random Urine: 89 mg/dL ( @ 20:18)  Protein/Creatinine Ratio Calculation: 0.6 Ratio ( @ 20:18)  Sodium, Random Urine: <35 mmol/L ( @ 15:18)  Creatinine, Random Urine: 99 mg/dL ( @ 15:18)          LIVER FUNCTIONS - ( 06 Dec 2019 06:25 )  Alb: 3.9 g/dL / Pro: 6.6 g/dL / ALK PHOS: 116 U/L / ALT: 18 U/L / AST: 26 U/L / GGT: x           PT/INR - ( 06 Dec 2019 09:35 )   PT: 25.6 sec;   INR: 2.21 ratio                      9.6    7.21  )-----------( 47       ( 05 Dec 2019 06:59 )             27.2     Urine Studies:  Urinalysis Basic - ( 04 Dec 2019 16:50 )    Color: Yellow / Appearance: Slightly Turbid / S.018 / pH:   Gluc:  / Ketone: Negative  / Bili: Negative / Urobili: Negative   Blood:  / Protein: 100 / Nitrite: Negative   Leuk Esterase: Large / RBC: 95 /hpf / WBC 93 /HPF   Sq Epi:  / Non Sq Epi: 0 /hpf / Bacteria: Many      Creatinine, Random Urine: 118 mg/dL ( @ 03:54)  Protein/Creatinine Ratio Calculation: 0.9 Ratio ( @ 03:54)  Creatinine, Random Urine: 89 mg/dL ( @ 20:18)  Protein/Creatinine Ratio Calculation: 0.6 Ratio ( @ 20:18)  Sodium, Random Urine: <35 mmol/L ( @ 15:18)  Creatinine, Random Urine: 99 mg/dL ( @ 15:18)          LIVER FUNCTIONS - ( 05 Dec 2019 06:58 )  Alb: 4.0 g/dL / Pro: 6.8 g/dL / ALK PHOS: 107 U/L / ALT: 18 U/L / AST: 28 U/L / GGT: x           PT/INR - ( 05 Dec 2019 07:03 )   PT: 24.7 sec;   INR: 2.10 ratio         PTT - ( 04 Dec 2019 08:45 )  PTT:37.7 sec  Color: Yellow / Appearance: Slightly Turbid / S.018 / pH:   Gluc:  / Ketone: Negative  / Bili: Negative / Urobili: Negative   Blood:  / Protein: 100 / Nitrite: Negative   Leuk Esterase: Large / RBC: 95 /hpf / WBC 93 /HPF   Sq Epi:  / Non Sq Epi: 0 /hpf / Bacteria: Many      Creatinine, Random Urine: 89 mg/dL ( @ 20:18)  Protein/Creatinine Ratio Calculation: 0.6 Ratio ( @ 20:18)  Sodium, Random Urine: <35 mmol/L ( @ 15:18)  Creatinine, Random Urine: 99 mg/dL ( @ 15:18)          LIVER FUNCTIONS - ( 04 Dec 2019 18:43 )  Alb: 4.0 g/dL / Pro: 6.6 g/dL / ALK PHOS: 111 U/L / ALT: 18 U/L / AST: 27 U/L / GGT: x           PT/INR - ( 04 Dec 2019 08:45 )   PT: 23.1 sec;   INR: 1.97 ratio         PTT - ( 04 Dec 2019 08:45 )  PTT:37.7 sec  Osmolality, Random Urine: 335 mosm/Kg ( @ 00:20)  Creatinine, Random Urine: 67 mg/dL ( @ 23:21)  Creatinine, Random Urine: 68 mg/dL ( @ 21:19)  Protein/Creatinine Ratio Calculation: 0.2 Ratio ( 21:19)  Potassium, Random Urine: 37 mmol/L ( 21:19)  Sodium, Random Urine: 61 mmol/L ( @ 21:19)          LIVER FUNCTIONS - ( 01 Dec 2019 06:13 )  Alb: 4.0 g/dL / Pro: 6.9 g/dL / ALK PHOS: 123 U/L / ALT: 21 U/L / AST: 35 U/L / GGT: x           PT/INR - ( 01 Dec 2019 06:13 )   PT: 22.5 sec;   INR: 1.92 ratio         PTT - ( 2019 07:09 )  PTT:41.6 sec  Osmolality, Random Urine: 335 mosm/Kg ( @ 00:20)  Creatinine, Random Urine: 67 mg/dL ( @ 23:21)  Creatinine, Random Urine: 68 mg/dL ( @ 21:19)  Protein/Creatinine Ratio Calculation: 0.2 Ratio ( @ 21:19)  Potassium, Random Urine: 37 mmol/L ( @ 21:19)  Sodium, Random Urine: 61 mmol/L ( @ 21:19)          LIVER FUNCTIONS - ( 2019 07:09 )  Alb: 4.0 g/dL / Pro: 6.7 g/dL / ALK PHOS: 121 U/L / ALT: 21 U/L / AST: 31 U/L / GGT: x           PT/INR - ( 2019 07:09 )   PT: 23.5 sec;   INR: 2.02 ratio         PTT - ( 2019 07:09 )  PTT:41.6 sec       PTT - ( 2019 08:22 )  PTT:37.6 sec  Color: Yellow / Appearance: Slightly Turbid / S.013 / pH:   Gluc:  / Ketone: Negative  / Bili: Negative / Urobili: <2 mg/dL   Blood:  / Protein: Trace / Nitrite: Negative   Leuk Esterase: Negative / RBC: 100 /HPF / WBC 4 /HPF   Sq Epi:  / Non Sq Epi: 0 /HPF / Bacteria: Negative      Osmolality, Random Urine: 335 mosm/Kg ( @ 00:20)  Creatinine, Random Urine: 67 mg/dL ( @ 23:21)  Creatinine, Random Urine: 68 mg/dL ( @ 21:19)  Protein/Creatinine Ratio Calculation: 0.2 Ratio ( @ 21:19)  Potassium, Random Urine: 37 mmol/L ( @ 21:19)  Sodium, Random Urine: 61 mmol/L ( @ 21:19)          LIVER FUNCTIONS - ( 2019 06:29 )  Alb: 3.0 g/dL / Pro: 6.9 g/dL / ALK PHOS: 182 U/L / ALT: 23 U/L / AST: 36 U/L / GGT: x           PT/INR - ( 2019 08:38 )   PT: 21.8 sec;   INR: 1.88 ratio         PTT - ( 2019 08:38 )  PTT:36.8 sec  Blood: x / Protein: Negative / Nitrite: Negative   Leuk Esterase: Negative / RBC: 14 /HPF / WBC 4 /HPF   Sq Epi: x / Non Sq Epi: 0 /HPF / Bacteria: Negative        RADIOLOGY & ADDITIONAL TESTS:    Imaging Personally Reviewed:    Consultant(s) Notes Reviewed:      Care Discussed with Consultants/Other Providers:

## 2019-12-07 NOTE — PROGRESS NOTE ADULT - ASSESSMENT
63 year old male with HTN, DM2, non-alcoholic cirrhosis, who presents with altered mental status. We last saw him during a hospitalization in 10/2019, during which he was significantly volume overloaded and required iv diuresis.  He was initially confused though his mental status has improved. He remains with hyponatremia and high ammonia level.  He is currently being evaluated for liver transplant    Abnormal EKG  - EKG is notable for prolonged qtc which was still present on 11/30. Repeat ekg over weekend.  - avoid qtc prolonging medications  - replete K to greater than 4  - check daily mg as well and replete to greater than 2     Diastolic HF   - no sign of acute ischemia  - no significant volume overload on exam. CT without pleural effusions or edema, though notable for possible chest wall hematoma.  - can hold diuretics.   - GI and renal follow up.  - TTE normal LV systolic function EF 65%; LVH, DD Grade I  - cath with normal filling pressures and no significant cad. continue with statin.    HTN  - BP on softer side, though has been better overall  - hold diuretics and anti-hypertensives  - midodrine as needed    - All other workup as per primary team  - Will follow with you. Further cardiac w/u as indicated by clinical course

## 2019-12-07 NOTE — PROGRESS NOTE ADULT - PROBLEM SELECTOR PLAN 1
- conts with intermittent episodes of confusion   - continue to titrate lactulose for 3 BM/day; please give Lactulose Enemas if pt unable to tolerate PO intake   - continue xifaxan bid   - diet as tolerated with aspiration precautions

## 2019-12-07 NOTE — PROGRESS NOTE ADULT - ASSESSMENT
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    #Presumed JEAN cirrhosis, MELD-Na 12/5 23 (improving Cr)      - varices: none on EGD outside in 10/2019      - HE: present, on lactulose and Xifaxan       - ascites: none currently, but history of ascites with SBP      - HCC: none on US 11/27, AFP 23 in 10/19; MRI w/o lesions from 12/6      - Vascular assessment: poor US doppler exam for eval of vessels due to body habitus       - Liver Workup: HAV immune, HBV non-immune, HCV neg, HIV neg, iron studies consistent wiith AoCD, RPR neg, QG neg, CMV neg, EBV IgG pos only, HSV1 pos, ASMA neg, AMA neg, ceruloplasmin LOW 12      - Age appropriate screening: colonoscopy 5-6 months ago per patient      - Dental evaluation: >1 year ago, no loose teeth per patient      - Cardiac evaluation: LHC completed 12/5, mild-mod non-occlusive CAD with low filling pressures, no interventions needed       - Blood type A+, Ab neg      - Previous transplant workup: none      - Social: retired, previous , lives in [place] with wife and son, previously highly functional (before October), Insurance -Medicare HMO  # MISA, Cr stable, likely pre-renal prior  # Chest wall hematoma  # T2DM  # Hypertension (currently normotensive)    Recs:  - f/u urine cx  - f/u urine culture Urinary tract infection treatment with CFX; today day 3  - continue lactulose 3-4 times daily and Xifaxan BID for HE  - fractionate bilirubin again  - obtain 24 hour urine copper given low ceruloplasmin (r/o Nnamdi's as etiology for cirrhosis)  - c/w q8 hour IV albumin doses as patient Cr improving  - continue with midodrine 20mg TID  - continue to hold diuretics  - PT evaluation for rehab needs   - obtain outpatient colonoscopy report  - patient to be discussed at transplant evaluation committee     - patient can likely be discharged with outpatient hepatology followup once above corrected Impression:  64yo M with Hypertension, DMT2, BPH, obesity, HFpEF grade 1, obesity, 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    #Presumed JEAN cirrhosis, MELD-Na 12/5 23 (improving Cr)      - varices: none on EGD outside in 10/2019      - HE: present, on lactulose and Xifaxan       - ascites: none currently, but history of ascites with SBP      - HCC: none on US 11/27, AFP 23 in 10/19; MRI w/o lesions from 12/6      - Vascular assessment: poor US doppler exam for eval of vessels due to body habitus       - Liver Workup: HAV immune, HBV non-immune, HCV neg, HIV neg, iron studies consistent wiith AoCD, RPR neg, QG neg, CMV neg, EBV IgG pos only, HSV1 pos, ASMA neg, AMA neg, ceruloplasmin LOW 12      - Age appropriate screening: colonoscopy 5-6 months ago per patient      - Dental evaluation: >1 year ago, no loose teeth per patient      - Cardiac evaluation: LHC completed 12/5, mild-mod non-occlusive CAD with low filling pressures, no interventions needed       - Blood type A+, Ab neg      - Previous transplant workup: none      - Social: retired, previous , lives in [place] with wife and son, previously highly functional (before October), Insurance -Medicare HMO  # MISA, Cr stable, likely pre-renal vs HRS  # Chest wall hematoma  # T2DM  # Hypertension (currently normotensive)    Recs:  - please repeat urine sodium  - restart q8 hour IV albumin as cr now worse today  - f/u urine cx  - f/u urine culture Urinary tract infection treatment with CFX; today day 3  - continue lactulose 3-4 times daily and Xifaxan BID for HE  - fractionate bilirubin again  - obtain 24 hour urine copper given low ceruloplasmin (r/o Nnamdi's as etiology for cirrhosis)  - continue with midodrine 20mg TID  - continue to hold diuretics  - PT evaluation for rehab needs   - obtain outpatient colonoscopy report  - patient to be discussed at transplant evaluation committee     - patient can likely be discharged with outpatient hepatology followup once above corrected

## 2019-12-07 NOTE — PROGRESS NOTE ADULT - SUBJECTIVE AND OBJECTIVE BOX
Pt seen/examined, comfortable    MEDICATIONS  (STANDING):  atorvastatin 10 milliGRAM(s) Oral at bedtime  cefTRIAXone   IVPB 1000 milliGRAM(s) IV Intermittent every 24 hours  dextrose 5%. 1000 milliLiter(s) (50 mL/Hr) IV Continuous <Continuous>  dextrose 50% Injectable 12.5 Gram(s) IV Push once  dextrose 50% Injectable 25 Gram(s) IV Push once  dextrose 50% Injectable 25 Gram(s) IV Push once  insulin glargine Injectable (LANTUS) 20 Unit(s) SubCutaneous at bedtime  insulin lispro (HumaLOG) corrective regimen sliding scale   SubCutaneous three times a day before meals  insulin lispro (HumaLOG) corrective regimen sliding scale   SubCutaneous at bedtime  lactulose Syrup 30 Gram(s) Oral every 4 hours  midodrine. 20 milliGRAM(s) Oral three times a day  pantoprazole    Tablet 40 milliGRAM(s) Oral before breakfast  rifAXIMin 550 milliGRAM(s) Oral two times a day  sucralfate 1 Gram(s) Oral two times a day  tamsulosin 0.4 milliGRAM(s) Oral at bedtime    MEDICATIONS  (PRN):  dextrose 40% Gel 15 Gram(s) Oral once PRN Blood Glucose LESS THAN 70 milliGRAM(s)/deciliter  glucagon  Injectable 1 milliGRAM(s) IntraMuscular once PRN Glucose LESS THAN 70 milligrams/deciliter      ROS  No fever, sweats, chills  No epistaxis, HA, sore throat  No CP, SOB, cough, sputum  No n/v/d, abd pain, melena, hematochezia  No edema  No rash  No anxiety  No back pain, joint pain  No bleeding, bruising  No dysuria, hematuria    Vital Signs Last 24 Hrs  T(C): 37.7 (07 Dec 2019 05:14), Max: 37.7 (07 Dec 2019 05:14)  T(F): 99.8 (07 Dec 2019 05:14), Max: 99.8 (07 Dec 2019 05:14)  HR: 88 (07 Dec 2019 05:14) (85 - 88)  BP: 111/67 (07 Dec 2019 05:14) (111/67 - 118/70)  BP(mean): --  RR: 17 (07 Dec 2019 05:14) (17 - 18)  SpO2: 96% (07 Dec 2019 05:14) (96% - 99%)    PE  NAD  Awake, alert  Anicteric, MMM  RRR  CTAB  Abd soft, NT, ND  No c/c/e  No rash grossly  FROM                          9.0    5.47  )-----------( 44       ( 07 Dec 2019 09:55 )             26.1       12-07    129<L>  |  92<L>  |  38<H>  ----------------------------<  226<H>  3.8   |  24  |  1.88<H>    Ca    10.6<H>      07 Dec 2019 07:01    TPro  6.5  /  Alb  3.7  /  TBili  7.4<H>  /  DBili  x   /  AST  26  /  ALT  16  /  AlkPhos  138<H>  12-07

## 2019-12-07 NOTE — PROGRESS NOTE ADULT - SUBJECTIVE AND OBJECTIVE BOX
[facility] - Sushila Dhaliwal, Doug, Morenita, Ayo, Colleen Williamson  Office Number:  [phone]    Patient resting comfortably in bed in NAD.  Laying flat with no respiratory distress.  No complaints of chest pain, dyspnea, palpitations, PND, or orthopnea.    ROS: negative unless otherwise mentioned.    Telemetry:  off    MEDICATIONS  (STANDING):  atorvastatin 10 milliGRAM(s) Oral at bedtime  cefTRIAXone   IVPB 1000 milliGRAM(s) IV Intermittent every 24 hours  dextrose 5%. 1000 milliLiter(s) (50 mL/Hr) IV Continuous <Continuous>  dextrose 50% Injectable 12.5 Gram(s) IV Push once  dextrose 50% Injectable 25 Gram(s) IV Push once  dextrose 50% Injectable 25 Gram(s) IV Push once  insulin glargine Injectable (LANTUS) 20 Unit(s) SubCutaneous at bedtime  insulin lispro (HumaLOG) corrective regimen sliding scale   SubCutaneous three times a day before meals  insulin lispro (HumaLOG) corrective regimen sliding scale   SubCutaneous at bedtime  lactulose Syrup 30 Gram(s) Oral every 4 hours  midodrine. 20 milliGRAM(s) Oral three times a day  pantoprazole    Tablet 40 milliGRAM(s) Oral before breakfast  rifAXIMin 550 milliGRAM(s) Oral two times a day  sucralfate 1 Gram(s) Oral two times a day  tamsulosin 0.4 milliGRAM(s) Oral at bedtime    MEDICATIONS  (PRN):  dextrose 40% Gel 15 Gram(s) Oral once PRN Blood Glucose LESS THAN 70 milliGRAM(s)/deciliter  glucagon  Injectable 1 milliGRAM(s) IntraMuscular once PRN Glucose LESS THAN 70 milligrams/deciliter      Allergies    codeine (Anaphylaxis)    Intolerances    NO  RED MEAT (Unknown)      Vital Signs Last 24 Hrs  T(C): 37.7 (07 Dec 2019 05:14), Max: 37.7 (07 Dec 2019 05:14)  T(F): 99.8 (07 Dec 2019 05:14), Max: 99.8 (07 Dec 2019 05:14)  HR: 88 (07 Dec 2019 05:14) (85 - 88)  BP: 111/67 (07 Dec 2019 05:14) (111/67 - 118/70)  BP(mean): --  RR: 17 (07 Dec 2019 05:14) (17 - 18)  SpO2: 96% (07 Dec 2019 05:14) (96% - 99%)    I&O's Summary    06 Dec 2019 07:01  -  07 Dec 2019 07:00  --------------------------------------------------------  IN: 250 mL / OUT: 950 mL / NET: -700 mL    07 Dec 2019 07:01  -  07 Dec 2019 11:37  --------------------------------------------------------  IN: 0 mL / OUT: 200 mL / NET: -200 mL        ON EXAM:      Constitutional: NAD, awake   HEENT: Moist Mucous Membranes, Anicteric  Pulmonary: Non-labored, breath sounds are clear bilaterally, No wheezing, rales or rhonchi  Cardiovascular: Regular, S1 and S2, No murmurs, rubs, gallops or clicks  Gastrointestinal: Bowel Sounds present, soft, nontender.   Lymph: No peripheral edema. No lymphadenopathy.  Skin: No visible rashes or ulcers.  Psych:  Mood & affect appropriate for situation  LABS: All Labs Reviewed:                        9.0    5.47  )-----------( 44       ( 07 Dec 2019 09:55 )             26.1                         9.2    5.93  )-----------( 46       ( 06 Dec 2019 07:44 )             26.3                         9.6    7.21  )-----------( 47       ( 05 Dec 2019 06:59 )             27.2     07 Dec 2019 07:01    129    |  92     |  38     ----------------------------<  226    3.8     |  24     |  1.88   06 Dec 2019 06:25    132    |  92     |  35     ----------------------------<  153    3.5     |  26     |  1.64   05 Dec 2019 06:58    134    |  94     |  39     ----------------------------<  131    4.0     |  24     |  1.70     Ca    10.6       07 Dec 2019 07:01  Ca    10.9       06 Dec 2019 06:25  Ca    10.7       05 Dec 2019 06:58    TPro  6.5    /  Alb  3.7    /  TBili  7.4    /  DBili  x      /  AST  26     /  ALT  16     /  AlkPhos  138    07 Dec 2019 07:01  TPro  6.6    /  Alb  3.9    /  TBili  8.2    /  DBili  x      /  AST  26     /  ALT  18     /  AlkPhos  116    06 Dec 2019 06:25  TPro  6.8    /  Alb  4.0    /  TBili  9.0    /  DBili  x      /  AST  28     /  ALT  18     /  AlkPhos  107    05 Dec 2019 06:58    PT/INR - ( 07 Dec 2019 09:42 )   PT: 23.9 sec;   INR: 2.05 ratio         PTT - ( 07 Dec 2019 09:42 )  PTT:44.1 sec      Blood Culture: Organism --  Gram Stain Blood -- Gram Stain --  Specimen Source .Urine Clean Catch (Midstream)  Culture-Blood --

## 2019-12-07 NOTE — PROGRESS NOTE ADULT - ASSESSMENT
· Assessment		  1. Chronic Normocytic Anemia    -- H/H stable  -- Undetectable Hapto sec to Liver Disease, remainder of hemolytic labs neg  -- Iron Studies c/w Anemia of Chronic Inflammation  -- No B12/Folate Deficiency  -- FOBT pos in the past  -- monitor Counts    2. IgG Kappa MGUS(Low Risk)    -- M spike only 200mg/dL  -- f/u SFLC    3. cirrhosis   -- per GI and hepatology       4. thrombocytopenia sec to cirrhosis     -- platelet count stable  -- trend  --transfuse for < 10,000 or <50,000 for procedures or evidence of bleeding    Thank you for the courtesy of this consultation and we will continue to follow.    Juan Carlos Rudd MD  [facility]  Cell: [phone]

## 2019-12-07 NOTE — PROGRESS NOTE ADULT - SUBJECTIVE AND OBJECTIVE BOX
INTERVAL HPI/OVERNIGHT EVENTS:    mentating well today   continues to have multiple bm's  tolerating po intake   denies n/v/d/c, abdominal pain, melena or brbpr       MEDICATIONS  (STANDING):  atorvastatin 10 milliGRAM(s) Oral at bedtime  cefTRIAXone   IVPB 1000 milliGRAM(s) IV Intermittent every 24 hours  dextrose 5%. 1000 milliLiter(s) (50 mL/Hr) IV Continuous <Continuous>  dextrose 50% Injectable 12.5 Gram(s) IV Push once  dextrose 50% Injectable 25 Gram(s) IV Push once  dextrose 50% Injectable 25 Gram(s) IV Push once  insulin glargine Injectable (LANTUS) 20 Unit(s) SubCutaneous at bedtime  insulin lispro (HumaLOG) corrective regimen sliding scale   SubCutaneous three times a day before meals  insulin lispro (HumaLOG) corrective regimen sliding scale   SubCutaneous at bedtime  lactulose Syrup 30 Gram(s) Oral every 4 hours  midodrine. 20 milliGRAM(s) Oral three times a day  pantoprazole    Tablet 40 milliGRAM(s) Oral before breakfast  rifAXIMin 550 milliGRAM(s) Oral two times a day  sucralfate 1 Gram(s) Oral two times a day  tamsulosin 0.4 milliGRAM(s) Oral at bedtime    MEDICATIONS  (PRN):  dextrose 40% Gel 15 Gram(s) Oral once PRN Blood Glucose LESS THAN 70 milliGRAM(s)/deciliter  glucagon  Injectable 1 milliGRAM(s) IntraMuscular once PRN Glucose LESS THAN 70 milligrams/deciliter      Allergies    codeine (Anaphylaxis)    Intolerances    NO  RED MEAT (Unknown)      Review of Systems:    General:  No wt loss, fevers, chills, night sweats, fatigue   Eyes:  Good vision, no reported pain  ENT:  No sore throat, pain, runny nose, dysphagia  CV:  No pain, palpitations, hypo/hypertension  Resp:  No dyspnea, cough, tachypnea, wheezing  GI:  No pain, No nausea, No vomiting, No diarrhea, No constipation, No weight loss, No fever, No pruritis, No rectal bleeding, No melena, No dysphagia  :  No pain, bleeding, incontinence, nocturia  Muscle:  No pain, weakness  Neuro:  No weakness, tingling, memory problems  Psych:  No fatigue, insomnia, mood problems, depression  Endocrine:  No polyuria, polydypsia, cold/heat intolerance  Heme:  No petechiae, ecchymosis, easy bruisability  Skin:  No rash, tattoos, scars, edema      Vital Signs Last 24 Hrs  T(C): 37.7 (07 Dec 2019 05:14), Max: 37.7 (07 Dec 2019 05:14)  T(F): 99.8 (07 Dec 2019 05:14), Max: 99.8 (07 Dec 2019 05:14)  HR: 88 (07 Dec 2019 05:14) (85 - 88)  BP: 111/67 (07 Dec 2019 05:14) (111/67 - 118/70)  BP(mean): --  RR: 17 (07 Dec 2019 05:14) (17 - 18)  SpO2: 96% (07 Dec 2019 05:14) (96% - 99%)    PHYSICAL EXAM:    Constitutional: NAD  HEENT: EOMI, throat clear  Neck: No LAD, supple  Respiratory: CTA and P  Cardiovascular: S1 and S2, RRR, no M  Gastrointestinal: BS+, soft, NT/ND, neg HSM,  Extremities: No peripheral edema, neg clubbing, cyanosis  Vascular: 2+ peripheral pulses  Neurological: A/O x 3, no focal deficits  Psychiatric: Normal mood, normal affect  Skin: No rashes +jauncie/+icterus       LABS:                        9.0    5.47  )-----------( 44       ( 07 Dec 2019 09:55 )             26.1     12-07    129<L>  |  92<L>  |  38<H>  ----------------------------<  226<H>  3.8   |  24  |  1.88<H>    Ca    10.6<H>      07 Dec 2019 07:01    TPro  6.5  /  Alb  3.7  /  TBili  7.4<H>  /  DBili  x   /  AST  26  /  ALT  16  /  AlkPhos  138<H>  12-07    PT/INR - ( 07 Dec 2019 09:42 )   PT: 23.9 sec;   INR: 2.05 ratio         PTT - ( 07 Dec 2019 09:42 )  PTT:44.1 sec      RADIOLOGY & ADDITIONAL TESTS:    < from: MR Abdomen w/wo IV Cont (12.06.19 @ 13:46) >    EXAM:  MR ABDOMEN WAW IC                            PROCEDURE DATE:  12/06/2019            INTERPRETATION:  CLINICAL INFORMATION: JEAN cirrhosis. Transplant   evaluation.    COMPARISON: MR abdomen 4/5/2019. CT abdomen pelvis 10/3/2019.     PROCEDURE:   MRI of the abdomen was performed with and without intravenous contrast.  IV Contrast: Gadavist. 10 cc administered, 0 cc discarded.  MRCP was performed.    FINDINGS:    LOWER CHEST: Within normal limits.    LIVER: Cirrhotic. No steatosis. No focal lesion.  BILE DUCTS: Normal caliber.  GALLBLADDER: Cholecystectomy.  SPLEEN: Within normal limits.  PANCREAS: Within normal limits.  ADRENALS: Within normal limits.  KIDNEYS/URETERS: Within normal limits.    VISUALIZED PORTIONS:    BOWEL: Within normal limits.   PERITONEUM: No ascites.  VESSELS: Standard hepatic arterial anatomy. Celiac axis and SMA are   widely patent. Main portal vein is patent however diminutive. SMV is   widely patent. Extensive upper abdominal and retroperitoneal varices   consistent with portal hypertension.  RETROPERITONEUM/LYMPH NODES: No lymphadenopathy.    ABDOMINAL WALL: Within normal limits.  BONES: Degenerative changes.    IMPRESSION:     Cirrhosis with evidence of portal hypertension.    No focal hepatic lesion.    Standard hepatic arterial anatomy with widely patent celiac axis and SMA.   SMV and portal veins are patent as above.        GINNA BAH M.D., RESIDENT RADIOLOGIST  This document has been electronically signed.  RICHARD PUCKETT M.D., ATTENDING RADIOLOGIST  This document has been electronically signed. Dec  6 2019  4:43PM                < end of copied text >

## 2019-12-07 NOTE — PROGRESS NOTE ADULT - PROBLEM SELECTOR PLAN 2
- recent EGD 5/2019 non-bleeding duodenal ulcers and No evidence of varices   - MRI reviewed   - cirrhosis 2/2 NAFLD   - US Abd without ascites; CT abd without new gi pathology; no liver lesions  - continue xifaxan and lactulose as above   - Protonix 40mg PO QD   - heme following to r/o hemolysis given increased indirect bili; care appreciated   - liver transplant evaluation in progress; appreciate hepatology input

## 2019-12-07 NOTE — PROGRESS NOTE ADULT - SUBJECTIVE AND OBJECTIVE BOX
Patient is a 63y Male whom presented to the hospital with ckd and dena   pateint seen and examined nad , no fever , no chills      PAST MEDICAL & SURGICAL HISTORY:  GIB (gastrointestinal bleeding)  GERD with esophagitis: Gastritis &amp; Non Bleeding Ulcers  Hepatic encephalopathy  Obesity  Fatty liver disease, nonalcoholic  Renal stones: 25 years ago  Hypertension  Neuropathy  Hypercholesteremia  Diabetes  S/P cholecystectomy      MEDICATIONS  (STANDING):  dextrose 5%. 1000 milliLiter(s) (50 mL/Hr) IV Continuous <Continuous>  dextrose 50% Injectable 12.5 Gram(s) IV Push once  dextrose 50% Injectable 25 Gram(s) IV Push once  dextrose 50% Injectable 25 Gram(s) IV Push once  insulin lispro (HumaLOG) corrective regimen sliding scale   SubCutaneous three times a day before meals  insulin lispro (HumaLOG) corrective regimen sliding scale   SubCutaneous at bedtime      Allergies    codeine (Anaphylaxis)    Intolerances    NO  RED MEAT (Unknown)                                     SOCIAL HISTORY:  Denies ETOh,Smoking,     FAMILY HISTORY:  Family history of type 2 diabetes mellitus  Family history of hypertension  Family history of stomach cancer      REVIEW OF SYSTEMS:    CONSTITUTIONAL: No weakness, fevers or chills  RESPIRATORY: No cough, wheezing, hemoptysis; No shortness of breath  CARDIOVASCULAR: No chest pain or palpitations  GASTROINTESTINAL: No abdominal or epigastric pain. No nausea, vomiting,     No diarrhea or constipation. No melena   GENITOURINARY: No dysuria, frequency or hematuria  NEUROLOGICAL: No numbness or weakness  SKIN: dry                                          9.0    5.47  )-----------( 44       ( 07 Dec 2019 09:55 )             26.1       CBC Full  -  ( 07 Dec 2019 09:55 )  WBC Count : 5.47 K/uL  RBC Count : 2.66 M/uL  Hemoglobin : 9.0 g/dL  Hematocrit : 26.1 %  Platelet Count - Automated : 44 K/uL  Mean Cell Volume : 98.1 fl  Mean Cell Hemoglobin : 33.8 pg  Mean Cell Hemoglobin Concentration : 34.5 gm/dL  Auto Neutrophil # : 3.68 K/uL  Auto Lymphocyte # : 0.76 K/uL  Auto Monocyte # : 0.85 K/uL  Auto Eosinophil # : 0.12 K/uL  Auto Basophil # : 0.04 K/uL  Auto Neutrophil % : 67.3 %  Auto Lymphocyte % : 13.9 %  Auto Monocyte % : 15.5 %  Auto Eosinophil % : 2.2 %  Auto Basophil % : 0.7 %      12-07    129<L>  |  92<L>  |  38<H>  ----------------------------<  226<H>  3.8   |  24  |  1.88<H>    Ca    10.6<H>      07 Dec 2019 07:01    TPro  6.5  /  Alb  3.7  /  TBili  7.4<H>  /  DBili  x   /  AST  26  /  ALT  16  /  AlkPhos  138<H>  12-07      CAPILLARY BLOOD GLUCOSE      POCT Blood Glucose.: 273 mg/dL (07 Dec 2019 12:12)  POCT Blood Glucose.: 221 mg/dL (07 Dec 2019 08:44)  POCT Blood Glucose.: 236 mg/dL (06 Dec 2019 21:34)  POCT Blood Glucose.: 248 mg/dL (06 Dec 2019 17:48)      Vital Signs Last 24 Hrs  T(C): 36.9 (07 Dec 2019 12:23), Max: 37.7 (07 Dec 2019 05:14)  T(F): 98.4 (07 Dec 2019 12:23), Max: 99.8 (07 Dec 2019 05:14)  HR: 88 (07 Dec 2019 05:14) (85 - 88)  BP: 111/67 (07 Dec 2019 05:14) (111/67 - 118/70)  BP(mean): --  RR: 18 (07 Dec 2019 12:23) (17 - 18)  SpO2: 98% (07 Dec 2019 12:23) (96% - 99%)        PT/INR - ( 07 Dec 2019 09:42 )   PT: 23.9 sec;   INR: 2.05 ratio         PTT - ( 07 Dec 2019 09:42 )  PTT:44.1 sec       PHYSICAL EXAM:    Constitutional: NAD  HEENT: conjunctive   clear   Neck:  No JVD  Respiratory: CTAB  Cardiovascular: S1 and S2  Gastrointestinal: BS+, soft, NT/ND  Extremities: No peripheral edema  Neurological:  no focal deficits  Psychiatric: Normal mood, normal affect  Skin: dry   Access: Not applicable

## 2019-12-07 NOTE — PROGRESS NOTE ADULT - PROBLEM SELECTOR PLAN 1
serum creatinine is 1.64   , approximating GFR is increased    ml/min.   There is  progression . No uremic symptoms    pos evidence of anemia .  Fluid status stable.  Will continue to avoid nephrotoxic drugs.  Patient remains asymptomatic.   Continue current therapy.

## 2019-12-07 NOTE — PROGRESS NOTE ADULT - SUBJECTIVE AND OBJECTIVE BOX
Chief Complaint:  Patient is a 63y old  Male who presents with a chief complaint of difficulty to ambulate/ weakness (06 Dec 2019 16:35)      Interval Events: No new events. Feels well. States having normal BM. Denies abdominal pain, n/v    Allergies:  codeine (Anaphylaxis)  NO  RED MEAT (Unknown)      Hospital Medications:  atorvastatin 10 milliGRAM(s) Oral at bedtime  cefTRIAXone   IVPB 1000 milliGRAM(s) IV Intermittent every 24 hours  dextrose 40% Gel 15 Gram(s) Oral once PRN  dextrose 5%. 1000 milliLiter(s) IV Continuous <Continuous>  dextrose 50% Injectable 12.5 Gram(s) IV Push once  dextrose 50% Injectable 25 Gram(s) IV Push once  dextrose 50% Injectable 25 Gram(s) IV Push once  glucagon  Injectable 1 milliGRAM(s) IntraMuscular once PRN  insulin glargine Injectable (LANTUS) 20 Unit(s) SubCutaneous at bedtime  insulin lispro (HumaLOG) corrective regimen sliding scale   SubCutaneous three times a day before meals  insulin lispro (HumaLOG) corrective regimen sliding scale   SubCutaneous at bedtime  lactulose Syrup 30 Gram(s) Oral every 4 hours  midodrine. 20 milliGRAM(s) Oral three times a day  pantoprazole    Tablet 40 milliGRAM(s) Oral before breakfast  rifAXIMin 550 milliGRAM(s) Oral two times a day  sucralfate 1 Gram(s) Oral two times a day  tamsulosin 0.4 milliGRAM(s) Oral at bedtime      PMHX/PSHX:  GIB (gastrointestinal bleeding)  GERD with esophagitis  Hepatic encephalopathy  Obesity  Fatty liver disease, nonalcoholic  Renal stones  Hypertension  Neuropathy  Hypercholesteremia  Diabetes  S/P cholecystectomy  No significant past surgical history      Family history:  Family history of type 2 diabetes mellitus  Family history of hypertension  Family history of stomach cancer  No pertinent family history in first degree relatives      ROS:     General:  No wt loss, fevers, chills, night sweats, fatigue,   Eyes:  Good vision, no reported pain  ENT:  No sore throat, pain, runny nose, dysphagia  CV:  No pain, palpitations, hypo/hypertension  Resp:  No dyspnea, cough, tachypnea, wheezing  GI:  See HPI  :  No pain, bleeding, incontinence, nocturia  Muscle:  No pain, weakness  Neuro:  No weakness, tingling, memory problems  Psych:  No fatigue, insomnia, mood problems, depression  Endocrine:  No polyuria, polydipsia, cold/heat intolerance  Heme:  No petechiae, ecchymosis, easy bruisability  Skin:  No rash, edema      PHYSICAL EXAM:     Vital Signs:  Vital Signs Last 24 Hrs  T(C): 37.7 (07 Dec 2019 05:14), Max: 37.7 (07 Dec 2019 05:14)  T(F): 99.8 (07 Dec 2019 05:14), Max: 99.8 (07 Dec 2019 05:14)  HR: 88 (07 Dec 2019 05:14) (85 - 88)  BP: 111/67 (07 Dec 2019 05:14) (111/67 - 118/70)  BP(mean): --  RR: 17 (07 Dec 2019 05:14) (17 - 18)  SpO2: 96% (07 Dec 2019 05:14) (96% - 99%)  Daily     Daily     GENERAL:  appears comfortable, no acute distress  HEENT:  NC/AT,  conjunctivae clear, sclera -anicteric  CHEST:  CTABL, no increased effort  HEART:  Regular rhythm, S1, S2, no murmur/rub/S3/S4  ABDOMEN:  Soft, non-tender, non-distended, normoactive bowel sounds,  no masses ,no hepato-splenomegaly,   EXTREMITIES:  no cyanosis, clubbing or edema  SKIN:  No rash/erythema/ecchymoses/petechiae/wounds  NEURO:  Alert, oriented    LABS:                        9.2    5.93  )-----------( 46       ( 06 Dec 2019 07:44 )             26.3     12-07    129<L>  |  92<L>  |  38<H>  ----------------------------<  226<H>  3.8   |  24  |  1.88<H>    Ca    10.6<H>      07 Dec 2019 07:01    TPro  6.5  /  Alb  3.7  /  TBili  7.4<H>  /  DBili  x   /  AST  26  /  ALT  16  /  AlkPhos  138<H>  12-07    LIVER FUNCTIONS - ( 07 Dec 2019 07:01 )  Alb: 3.7 g/dL / Pro: 6.5 g/dL / ALK PHOS: 138 U/L / ALT: 16 U/L / AST: 26 U/L / GGT: x           PT/INR - ( 06 Dec 2019 09:35 )   PT: 25.6 sec;   INR: 2.21 ratio                 Imaging:      < from: MR Abdomen w/wo IV Cont (12.06.19 @ 13:46) >    FINDINGS:    LOWER CHEST: Within normal limits.    LIVER: Cirrhotic. No steatosis. No focal lesion.  BILE DUCTS: Normal caliber.  GALLBLADDER: Cholecystectomy.  SPLEEN: Within normal limits.  PANCREAS: Within normal limits.  ADRENALS: Within normal limits.  KIDNEYS/URETERS: Within normal limits.    VISUALIZED PORTIONS:    BOWEL: Within normal limits.   PERITONEUM: No ascites.  VESSELS: Standard hepatic arterial anatomy. Celiac axis and SMA are   widely patent. Main portal vein is patent however diminutive. SMV is   widely patent. Extensive upper abdominal and retroperitoneal varices   consistent with portal hypertension.  RETROPERITONEUM/LYMPH NODES: No lymphadenopathy.    ABDOMINAL WALL: Within normal limits.  BONES: Degenerative changes.    IMPRESSION:     Cirrhosis with evidence of portal hypertension.    No focal hepatic lesion.    Standard hepatic arterial anatomy with widely patent celiac axis and SMA.   SMV and portal veins are patent as above.        < end of copied text >

## 2019-12-08 LAB
ALBUMIN SERPL ELPH-MCNC: 3.9 G/DL — SIGNIFICANT CHANGE UP (ref 3.3–5)
ALP SERPL-CCNC: 123 U/L — HIGH (ref 40–120)
ALT FLD-CCNC: 16 U/L — SIGNIFICANT CHANGE UP (ref 10–45)
ANION GAP SERPL CALC-SCNC: 12 MMOL/L — SIGNIFICANT CHANGE UP (ref 5–17)
ANION GAP SERPL CALC-SCNC: 16 MMOL/L — SIGNIFICANT CHANGE UP (ref 5–17)
ANION GAP SERPL CALC-SCNC: 16 MMOL/L — SIGNIFICANT CHANGE UP (ref 5–17)
AST SERPL-CCNC: 26 U/L — SIGNIFICANT CHANGE UP (ref 10–40)
BASOPHILS # BLD AUTO: 0 K/UL — SIGNIFICANT CHANGE UP (ref 0–0.2)
BASOPHILS NFR BLD AUTO: 0 % — SIGNIFICANT CHANGE UP (ref 0–2)
BILIRUB SERPL-MCNC: 6.8 MG/DL — HIGH (ref 0.2–1.2)
BUN SERPL-MCNC: 34 MG/DL — HIGH (ref 7–23)
BUN SERPL-MCNC: 37 MG/DL — HIGH (ref 7–23)
BUN SERPL-MCNC: 38 MG/DL — HIGH (ref 7–23)
CALCIUM SERPL-MCNC: 10.3 MG/DL — SIGNIFICANT CHANGE UP (ref 8.4–10.5)
CALCIUM SERPL-MCNC: 10.8 MG/DL — HIGH (ref 8.4–10.5)
CALCIUM SERPL-MCNC: 10.8 MG/DL — HIGH (ref 8.4–10.5)
CHLORIDE SERPL-SCNC: 90 MMOL/L — LOW (ref 96–108)
CHLORIDE SERPL-SCNC: 91 MMOL/L — LOW (ref 96–108)
CHLORIDE SERPL-SCNC: 92 MMOL/L — LOW (ref 96–108)
CO2 SERPL-SCNC: 22 MMOL/L — SIGNIFICANT CHANGE UP (ref 22–31)
CO2 SERPL-SCNC: 23 MMOL/L — SIGNIFICANT CHANGE UP (ref 22–31)
CO2 SERPL-SCNC: 25 MMOL/L — SIGNIFICANT CHANGE UP (ref 22–31)
CREAT SERPL-MCNC: 1.75 MG/DL — HIGH (ref 0.5–1.3)
CREAT SERPL-MCNC: 1.76 MG/DL — HIGH (ref 0.5–1.3)
CREAT SERPL-MCNC: 1.8 MG/DL — HIGH (ref 0.5–1.3)
EOSINOPHIL # BLD AUTO: 0.05 K/UL — SIGNIFICANT CHANGE UP (ref 0–0.5)
EOSINOPHIL NFR BLD AUTO: 1 % — SIGNIFICANT CHANGE UP (ref 0–6)
GLUCOSE BLDC GLUCOMTR-MCNC: 197 MG/DL — HIGH (ref 70–99)
GLUCOSE BLDC GLUCOMTR-MCNC: 238 MG/DL — HIGH (ref 70–99)
GLUCOSE BLDC GLUCOMTR-MCNC: 240 MG/DL — HIGH (ref 70–99)
GLUCOSE BLDC GLUCOMTR-MCNC: 252 MG/DL — HIGH (ref 70–99)
GLUCOSE SERPL-MCNC: 197 MG/DL — HIGH (ref 70–99)
GLUCOSE SERPL-MCNC: 224 MG/DL — HIGH (ref 70–99)
GLUCOSE SERPL-MCNC: 258 MG/DL — HIGH (ref 70–99)
HCT VFR BLD CALC: 24.5 % — LOW (ref 39–50)
HGB BLD-MCNC: 8.4 G/DL — LOW (ref 13–17)
LYMPHOCYTES # BLD AUTO: 1.77 K/UL — SIGNIFICANT CHANGE UP (ref 1–3.3)
LYMPHOCYTES # BLD AUTO: 37 % — SIGNIFICANT CHANGE UP (ref 13–44)
MCHC RBC-ENTMCNC: 33.1 PG — SIGNIFICANT CHANGE UP (ref 27–34)
MCHC RBC-ENTMCNC: 34.3 GM/DL — SIGNIFICANT CHANGE UP (ref 32–36)
MCV RBC AUTO: 96.5 FL — SIGNIFICANT CHANGE UP (ref 80–100)
MONOCYTES # BLD AUTO: 0.33 K/UL — SIGNIFICANT CHANGE UP (ref 0–0.9)
MONOCYTES NFR BLD AUTO: 7 % — SIGNIFICANT CHANGE UP (ref 2–14)
NEUTROPHILS # BLD AUTO: 2.63 K/UL — SIGNIFICANT CHANGE UP (ref 1.8–7.4)
NEUTROPHILS NFR BLD AUTO: 55 % — SIGNIFICANT CHANGE UP (ref 43–77)
PLATELET # BLD AUTO: 38 K/UL — LOW (ref 150–400)
POTASSIUM SERPL-MCNC: 3.5 MMOL/L — SIGNIFICANT CHANGE UP (ref 3.5–5.3)
POTASSIUM SERPL-MCNC: 3.5 MMOL/L — SIGNIFICANT CHANGE UP (ref 3.5–5.3)
POTASSIUM SERPL-MCNC: 3.7 MMOL/L — SIGNIFICANT CHANGE UP (ref 3.5–5.3)
POTASSIUM SERPL-SCNC: 3.5 MMOL/L — SIGNIFICANT CHANGE UP (ref 3.5–5.3)
POTASSIUM SERPL-SCNC: 3.5 MMOL/L — SIGNIFICANT CHANGE UP (ref 3.5–5.3)
POTASSIUM SERPL-SCNC: 3.7 MMOL/L — SIGNIFICANT CHANGE UP (ref 3.5–5.3)
PROT SERPL-MCNC: 6 G/DL — SIGNIFICANT CHANGE UP (ref 6–8.3)
RBC # BLD: 2.54 M/UL — LOW (ref 4.2–5.8)
RBC # FLD: 18.1 % — HIGH (ref 10.3–14.5)
SODIUM SERPL-SCNC: 129 MMOL/L — LOW (ref 135–145)
WBC # BLD: 4.78 K/UL — SIGNIFICANT CHANGE UP (ref 3.8–10.5)
WBC # FLD AUTO: 4.78 K/UL — SIGNIFICANT CHANGE UP (ref 3.8–10.5)

## 2019-12-08 PROCEDURE — 99232 SBSQ HOSP IP/OBS MODERATE 35: CPT

## 2019-12-08 PROCEDURE — 99232 SBSQ HOSP IP/OBS MODERATE 35: CPT | Mod: GC

## 2019-12-08 RX ORDER — LACTULOSE 10 G/15ML
200 SOLUTION ORAL ONCE
Refills: 0 | Status: COMPLETED | OUTPATIENT
Start: 2019-12-08 | End: 2019-12-08

## 2019-12-08 RX ADMIN — MIDODRINE HYDROCHLORIDE 20 MILLIGRAM(S): 2.5 TABLET ORAL at 13:09

## 2019-12-08 RX ADMIN — SODIUM CHLORIDE 2 GRAM(S): 9 INJECTION INTRAMUSCULAR; INTRAVENOUS; SUBCUTANEOUS at 06:03

## 2019-12-08 RX ADMIN — LACTULOSE 30 GRAM(S): 10 SOLUTION ORAL at 17:28

## 2019-12-08 RX ADMIN — LACTULOSE 30 GRAM(S): 10 SOLUTION ORAL at 13:10

## 2019-12-08 RX ADMIN — MIDODRINE HYDROCHLORIDE 20 MILLIGRAM(S): 2.5 TABLET ORAL at 06:03

## 2019-12-08 RX ADMIN — MIDODRINE HYDROCHLORIDE 20 MILLIGRAM(S): 2.5 TABLET ORAL at 17:28

## 2019-12-08 RX ADMIN — LACTULOSE 30 GRAM(S): 10 SOLUTION ORAL at 06:02

## 2019-12-08 RX ADMIN — Medication 3: at 17:28

## 2019-12-08 RX ADMIN — LACTULOSE 200 GRAM(S): 10 SOLUTION ORAL at 17:29

## 2019-12-08 RX ADMIN — ATORVASTATIN CALCIUM 10 MILLIGRAM(S): 80 TABLET, FILM COATED ORAL at 21:19

## 2019-12-08 RX ADMIN — Medication 2: at 13:08

## 2019-12-08 RX ADMIN — INSULIN GLARGINE 20 UNIT(S): 100 INJECTION, SOLUTION SUBCUTANEOUS at 22:32

## 2019-12-08 RX ADMIN — SODIUM CHLORIDE 2 GRAM(S): 9 INJECTION INTRAMUSCULAR; INTRAVENOUS; SUBCUTANEOUS at 13:09

## 2019-12-08 RX ADMIN — Medication 1: at 08:54

## 2019-12-08 RX ADMIN — LACTULOSE 30 GRAM(S): 10 SOLUTION ORAL at 21:19

## 2019-12-08 RX ADMIN — Medication 1 GRAM(S): at 06:03

## 2019-12-08 RX ADMIN — TAMSULOSIN HYDROCHLORIDE 0.4 MILLIGRAM(S): 0.4 CAPSULE ORAL at 21:19

## 2019-12-08 RX ADMIN — CEFTRIAXONE 100 MILLIGRAM(S): 500 INJECTION, POWDER, FOR SOLUTION INTRAMUSCULAR; INTRAVENOUS at 20:37

## 2019-12-08 RX ADMIN — LACTULOSE 30 GRAM(S): 10 SOLUTION ORAL at 01:43

## 2019-12-08 RX ADMIN — Medication 1 GRAM(S): at 17:28

## 2019-12-08 RX ADMIN — LACTULOSE 30 GRAM(S): 10 SOLUTION ORAL at 10:41

## 2019-12-08 RX ADMIN — PANTOPRAZOLE SODIUM 40 MILLIGRAM(S): 20 TABLET, DELAYED RELEASE ORAL at 06:03

## 2019-12-08 RX ADMIN — Medication 50 MILLILITER(S): at 06:01

## 2019-12-08 NOTE — PROGRESS NOTE ADULT - PROBLEM SELECTOR PLAN 1
- improving   - continue to titrate lactulose for 3 BM/day; please give Lactulose Enemas if pt unable to tolerate PO intake   - continue xifaxan bid   - diet as tolerated with aspiration precautions

## 2019-12-08 NOTE — PROGRESS NOTE ADULT - PROBLEM SELECTOR PLAN 1
serum creatinine is 1.76   , approximating GFR is increased    ml/min.   There is  progression . No uremic symptoms    pos evidence of anemia .  Fluid status stable.  Will continue to avoid nephrotoxic drugs.  Patient remains asymptomatic.   Continue current therapy.

## 2019-12-08 NOTE — PROGRESS NOTE ADULT - SUBJECTIVE AND OBJECTIVE BOX
Chief Complaint:  Patient is a 64y old  Male who presents with a chief complaint of difficulty to ambulate/ weakness (08 Dec 2019 11:08)      Interval Events: No new events. Feels well overal. No n/v/d.    Allergies:  codeine (Anaphylaxis)  NO  RED MEAT (Unknown)      Hospital Medications:  atorvastatin 10 milliGRAM(s) Oral at bedtime  cefTRIAXone   IVPB 1000 milliGRAM(s) IV Intermittent every 24 hours  dextrose 40% Gel 15 Gram(s) Oral once PRN  dextrose 5%. 1000 milliLiter(s) IV Continuous <Continuous>  dextrose 50% Injectable 12.5 Gram(s) IV Push once  dextrose 50% Injectable 25 Gram(s) IV Push once  dextrose 50% Injectable 25 Gram(s) IV Push once  glucagon  Injectable 1 milliGRAM(s) IntraMuscular once PRN  insulin glargine Injectable (LANTUS) 20 Unit(s) SubCutaneous at bedtime  insulin lispro (HumaLOG) corrective regimen sliding scale   SubCutaneous three times a day before meals  insulin lispro (HumaLOG) corrective regimen sliding scale   SubCutaneous at bedtime  lactulose Syrup 30 Gram(s) Oral every 4 hours  midodrine. 20 milliGRAM(s) Oral three times a day  pantoprazole    Tablet 40 milliGRAM(s) Oral before breakfast  rifAXIMin 550 milliGRAM(s) Oral two times a day  sucralfate 1 Gram(s) Oral two times a day  tamsulosin 0.4 milliGRAM(s) Oral at bedtime      PMHX/PSHX:  GIB (gastrointestinal bleeding)  GERD with esophagitis  Hepatic encephalopathy  Obesity  Fatty liver disease, nonalcoholic  Renal stones  Hypertension  Neuropathy  Hypercholesteremia  Diabetes  S/P cholecystectomy  No significant past surgical history      Family history:  Family history of type 2 diabetes mellitus  Family history of hypertension  Family history of stomach cancer  No pertinent family history in first degree relatives      ROS:     General:  No wt loss, fevers, chills, night sweats, fatigue,   Eyes:  Good vision, no reported pain  ENT:  No sore throat, pain, runny nose, dysphagia  CV:  No pain, palpitations, hypo/hypertension  Resp:  No dyspnea, cough, tachypnea, wheezing  GI:  See HPI  :  No pain, bleeding, incontinence, nocturia  Muscle:  No pain, weakness  Neuro:  No weakness, tingling, memory problems  Psych:  No fatigue, insomnia, mood problems, depression  Endocrine:  No polyuria, polydipsia, cold/heat intolerance  Heme:  No petechiae, ecchymosis, easy bruisability  Skin:  No rash, edema      PHYSICAL EXAM:     Vital Signs:  Vital Signs Last 24 Hrs  T(C): 37.1 (08 Dec 2019 12:20), Max: 37.1 (08 Dec 2019 12:20)  T(F): 98.8 (08 Dec 2019 12:20), Max: 98.8 (08 Dec 2019 12:20)  HR: 74 (08 Dec 2019 04:40) (70 - 74)  BP: 113/62 (08 Dec 2019 04:40) (113/62 - 117/57)  BP(mean): --  RR: 18 (08 Dec 2019 12:20) (17 - 18)  SpO2: 98% (08 Dec 2019 12:20) (96% - 98%)  Daily     Daily     GENERAL:  appears comfortable, no acute distress  HEENT:  NC/AT,  conjunctivae clear, +scleral icterus  CHEST:  CTABL, no increased effort  HEART:  Regular rhythm, S1, S2, no murmur/rub/S3/S4  ABDOMEN:  Soft, non-tender, non-distended, normoactive bowel sounds,  no masses ,no hepato-splenomegaly,   EXTREMITIES:  no cyanosis, clubbing or edema  SKIN:  No rash/erythema/ecchymoses/petechiae/wounds  NEURO:  Alert, oriented    LABS:                        8.4    4.78  )-----------( 38       ( 08 Dec 2019 09:38 )             24.5     12-08    129<L>  |  92<L>  |  34<H>  ----------------------------<  197<H>  3.5   |  25  |  1.76<H>    Ca    10.3      08 Dec 2019 07:07    TPro  6.0  /  Alb  3.9  /  TBili  6.8<H>  /  DBili  x   /  AST  26  /  ALT  16  /  AlkPhos  123<H>  12-08    LIVER FUNCTIONS - ( 08 Dec 2019 07:07 )  Alb: 3.9 g/dL / Pro: 6.0 g/dL / ALK PHOS: 123 U/L / ALT: 16 U/L / AST: 26 U/L / GGT: x           PT/INR - ( 07 Dec 2019 09:42 )   PT: 23.9 sec;   INR: 2.05 ratio         PTT - ( 07 Dec 2019 09:42 )  PTT:44.1 sec        Imaging:

## 2019-12-08 NOTE — PROGRESS NOTE ADULT - SUBJECTIVE AND OBJECTIVE BOX
SUBJECTIVE / OVERNIGHT EVENTS: pt denies headache, nausea,v   chest pain, shortness of breath , no bm today , + flatus     MEDICATIONS  (STANDING):  atorvastatin 10 milliGRAM(s) Oral at bedtime  cefTRIAXone   IVPB 1000 milliGRAM(s) IV Intermittent every 24 hours  dextrose 5%. 1000 milliLiter(s) (50 mL/Hr) IV Continuous <Continuous>  dextrose 50% Injectable 12.5 Gram(s) IV Push once  dextrose 50% Injectable 25 Gram(s) IV Push once  dextrose 50% Injectable 25 Gram(s) IV Push once  insulin glargine Injectable (LANTUS) 20 Unit(s) SubCutaneous at bedtime  insulin lispro (HumaLOG) corrective regimen sliding scale   SubCutaneous three times a day before meals  insulin lispro (HumaLOG) corrective regimen sliding scale   SubCutaneous at bedtime  lactulose Syrup 30 Gram(s) Oral every 4 hours  midodrine. 20 milliGRAM(s) Oral three times a day  pantoprazole    Tablet 40 milliGRAM(s) Oral before breakfast  rifAXIMin 550 milliGRAM(s) Oral two times a day  sucralfate 1 Gram(s) Oral two times a day  tamsulosin 0.4 milliGRAM(s) Oral at bedtime    MEDICATIONS  (PRN):  dextrose 40% Gel 15 Gram(s) Oral once PRN Blood Glucose LESS THAN 70 milliGRAM(s)/deciliter  glucagon  Injectable 1 milliGRAM(s) IntraMuscular once PRN Glucose LESS THAN 70 milligrams/deciliter    Vital Signs Last 24 Hrs  T(C): 37.6 (08 Dec 2019 21:06), Max: 37.6 (08 Dec 2019 21:06)  T(F): 99.7 (08 Dec 2019 21:06), Max: 99.7 (08 Dec 2019 21:06)  HR: 81 (08 Dec 2019 21:06) (70 - 81)  BP: 121/66 (08 Dec 2019 21:06) (113/62 - 121/66)  BP(mean): --  RR: 18 (08 Dec 2019 21:06) (17 - 18)  SpO2: 96% (08 Dec 2019 21:06) (96% - 98%)    Constitutional: No fever, fatigue  Skin: No rash.  Eyes: No recent vision problems or eye pain.  ENT: No congestion, ear pain, or sore throat.  Cardiovascular: No chest pain or palpation.  Respiratory: No cough, shortness of breath, congestion, or wheezing.  Gastrointestinal: No abdominal pain, nausea, vomiting, or diarrhea.  Genitourinary: No dysuria.  Musculoskeletal: No joint swelling.  Neurologic: No headache.    PHYSICAL EXAM:  GENERAL: NAD  EYES: EOMI, PERRLA  NECK: Supple, No JVD  CHEST/LUNG: dec breath sounds at bases   HEART:  S1 , S2 +  ABDOMEN: soft , bs+, mild distension + , no tenderness  EXTREMITIES:  no edema  NEUROLOGY:alert awake oriented   flap tremor+    LABS:      129<L>  |  91<L>  |  37<H>  ----------------------------<  258<H>  3.7   |  22  |  1.75<H>    Ca    10.8<H>      08 Dec 2019 18:36    TPro  6.0  /  Alb  3.9  /  TBili  6.8<H>  /  DBili      /  AST  26  /  ALT  16  /  AlkPhos  123<H>      Creatinine Trend: 1.75 <--, 1.76 <--, 1.80 <--, 1.88 <--, 1.64 <--, 1.70 <--, 1.74 <--, 1.99 <--, 1.96 <--, 2.22 <--, 2.49 <--                        8.4    4.78  )-----------( 38       ( 08 Dec 2019 09:38 )             24.5     Urine Studies:  Urinalysis Basic - ( 04 Dec 2019 16:50 )    Color: Yellow / Appearance: Slightly Turbid / S.018 / pH:   Gluc:  / Ketone: Negative  / Bili: Negative / Urobili: Negative   Blood:  / Protein: 100 / Nitrite: Negative   Leuk Esterase: Large / RBC: 95 /hpf / WBC 93 /HPF   Sq Epi:  / Non Sq Epi: 0 /hpf / Bacteria: Many      Sodium, Random Urine: <35 mmol/L ( @ 16:31)  Creatinine, Random Urine: 118 mg/dL ( @ 03:54)  Protein/Creatinine Ratio Calculation: 0.9 Ratio ( @ 03:54)  Creatinine, Random Urine: 89 mg/dL ( @ 20:18)  Protein/Creatinine Ratio Calculation: 0.6 Ratio ( @ 20:18)  Sodium, Random Urine: <35 mmol/L ( @ 15:18)  Creatinine, Random Urine: 99 mg/dL ( @ 15:18)          LIVER FUNCTIONS - ( 08 Dec 2019 07:07 )  Alb: 3.9 g/dL / Pro: 6.0 g/dL / ALK PHOS: 123 U/L / ALT: 16 U/L / AST: 26 U/L / GGT: x           PT/INR - ( 07 Dec 2019 09:42 )   PT: 23.9 sec;   INR: 2.05 ratio         PTT - ( 07 Dec 2019 09:42 )  PTT:44.1 sec

## 2019-12-08 NOTE — PROGRESS NOTE ADULT - ASSESSMENT
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    #Presumed JEAN cirrhosis, MELD-Na 12/5 23 (improving Cr)      - varices: none on EGD outside in 10/2019      - HE: present, on lactulose and Xifaxan       - ascites: none currently, but history of ascites with SBP      - HCC: none on US 11/27, AFP 23 in 10/19; MRI w/o lesions from 12/6      - Vascular assessment: poor US doppler exam for eval of vessels due to body habitus       - Liver Workup: HAV immune, HBV non-immune, HCV neg, HIV neg, iron studies consistent wiith AoCD, RPR neg, QG neg, CMV neg, EBV IgG pos only, HSV1 pos, ASMA neg, AMA neg, ceruloplasmin LOW 12      - Age appropriate screening: colonoscopy 5-6 months ago per patient      - Dental evaluation: >1 year ago, no loose teeth per patient      - Cardiac evaluation: LHC completed 12/5, mild-mod non-occlusive CAD with low filling pressures, no interventions needed       - Blood type A+, Ab neg      - Previous transplant workup: none      - Social: retired, previous , lives in [place] with wife and son, previously highly functional (before October), Insurance -Medicare HMO  # MISA, Cr stable, likely pre-renal vs HRS; urine sodium still low, likely HRS  # Chest wall hematoma  # T2DM  # Hypertension (currently normotensive)    Recs:  - restart q8 hour IV albumi  - f/u urine cx  - f/u urine culture Urinary tract infection treatment with CFX; today day 4  - continue lactulose 3-4 times daily and Xifaxan BID for HE  - obtain 24 hour urine copper given low ceruloplasmin (r/o Nnamdi's as etiology for cirrhosis)  - continue with midodrine 20mg TID  - hold diuretics  - PT evaluation for rehab needs   - obtain outpatient colonoscopy report  - patient to be discussed at transplant evaluation committee     - patient can likely be discharged with outpatient hepatology followup once above corrected Impression:  62yo M with Hypertension, DMT2, BPH, obesity, HFpEF grade 1, obesity, 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    #Presumed JEAN cirrhosis, MELD-Na 12/5 23 (improving Cr)      - varices: none on EGD outside in 10/2019      - HE: present, on lactulose and Xifaxan       - ascites: none currently, but history of ascites with SBP      - HCC: none on US 11/27, AFP 23 in 10/19; MRI w/o lesions from 12/6      - Vascular assessment: poor US doppler exam for eval of vessels due to body habitus       - Liver Workup: HAV immune, HBV non-immune, HCV neg, HIV neg, iron studies consistent wiith AoCD, RPR neg, QG neg, CMV neg, EBV IgG pos only, HSV1 pos, ASMA neg, AMA neg, ceruloplasmin LOW 12      - Age appropriate screening: colonoscopy 5-6 months ago per patient      - Dental evaluation: >1 year ago, no loose teeth per patient      - Cardiac evaluation: LHC completed 12/5, mild-mod non-occlusive CAD with low filling pressures, no interventions needed       - Blood type A+, Ab neg      - Previous transplant workup: none      - Social: retired, previous , lives in [place] with wife and son, previously highly functional (before October), Insurance -Medicare HMO  # MISA, Cr stable, likely pre-renal vs HRS; urine sodium still low, likely HRS  # Chest wall hematoma  # T2DM  # Hypertension (currently normotensive)    Recs:  - continue q8 hour IV albumi  - f/u urine cx  - f/u urine culture Urinary tract infection treatment with CFX; today day 4  - continue lactulose 3-4 times daily and Xifaxan BID for HE  - obtain 24 hour urine copper given low ceruloplasmin (r/o Nnamdi's as etiology for cirrhosis)  - continue with midodrine 20mg TID  - hold diuretics  - PT evaluation for rehab needs   - obtain outpatient colonoscopy report  - patient to be discussed at transplant evaluation committee     - patient can likely be discharged with outpatient hepatology followup once above corrected

## 2019-12-08 NOTE — PROGRESS NOTE ADULT - SUBJECTIVE AND OBJECTIVE BOX
INTERVAL HPI/OVERNIGHT EVENTS:    sleeping, easily awoken   continues to have adequate bowel movements  no abd pain nausea or vomiting     MEDICATIONS  (STANDING):  atorvastatin 10 milliGRAM(s) Oral at bedtime  cefTRIAXone   IVPB 1000 milliGRAM(s) IV Intermittent every 24 hours  dextrose 5%. 1000 milliLiter(s) (50 mL/Hr) IV Continuous <Continuous>  dextrose 50% Injectable 12.5 Gram(s) IV Push once  dextrose 50% Injectable 25 Gram(s) IV Push once  dextrose 50% Injectable 25 Gram(s) IV Push once  insulin glargine Injectable (LANTUS) 20 Unit(s) SubCutaneous at bedtime  insulin lispro (HumaLOG) corrective regimen sliding scale   SubCutaneous three times a day before meals  insulin lispro (HumaLOG) corrective regimen sliding scale   SubCutaneous at bedtime  lactulose Syrup 30 Gram(s) Oral every 4 hours  midodrine. 20 milliGRAM(s) Oral three times a day  pantoprazole    Tablet 40 milliGRAM(s) Oral before breakfast  rifAXIMin 550 milliGRAM(s) Oral two times a day  sodium chloride 2 Gram(s) Oral four times a day  sucralfate 1 Gram(s) Oral two times a day  tamsulosin 0.4 milliGRAM(s) Oral at bedtime    MEDICATIONS  (PRN):  dextrose 40% Gel 15 Gram(s) Oral once PRN Blood Glucose LESS THAN 70 milliGRAM(s)/deciliter  glucagon  Injectable 1 milliGRAM(s) IntraMuscular once PRN Glucose LESS THAN 70 milligrams/deciliter      Allergies    codeine (Anaphylaxis)    Intolerances    NO  RED MEAT (Unknown)      Review of Systems:    General:  No wt loss, fevers, chills, night sweats, fatigue   Eyes:  Good vision, no reported pain  ENT:  No sore throat, pain, runny nose, dysphagia  CV:  No pain, palpitations, hypo/hypertension  Resp:  No dyspnea, cough, tachypnea, wheezing  GI:  No pain, No nausea, No vomiting, No diarrhea, No constipation, No weight loss, No fever, No pruritis, No rectal bleeding, No melena, No dysphagia  :  No pain, bleeding, incontinence, nocturia  Muscle:  No pain, weakness  Neuro:  No weakness, tingling, memory problems  Psych:  No fatigue, insomnia, mood problems, depression  Endocrine:  No polyuria, polydypsia, cold/heat intolerance  Heme:  No petechiae, ecchymosis, easy bruisability  Skin:  No rash, tattoos, scars, edema      Vital Signs Last 24 Hrs  T(C): 36.7 (08 Dec 2019 04:40), Max: 36.9 (07 Dec 2019 12:23)  T(F): 98 (08 Dec 2019 04:40), Max: 98.4 (07 Dec 2019 12:23)  HR: 74 (08 Dec 2019 04:40) (70 - 74)  BP: 113/62 (08 Dec 2019 04:40) (113/62 - 117/57)  BP(mean): --  RR: 17 (08 Dec 2019 04:40) (17 - 18)  SpO2: 97% (08 Dec 2019 04:40) (96% - 98%)    PHYSICAL EXAM:    Constitutional: NAD  HEENT: EOMI, throat clear  Neck: No LAD, supple  Respiratory: CTA and P  Cardiovascular: S1 and S2, RRR, no M  Gastrointestinal: BS+, soft, NT/ND, neg HSM,  Extremities: No peripheral edema, neg clubbing, cyanosis  Vascular: 2+ peripheral pulses  Neurological: A/O x 2-3, no focal deficits  Psychiatric: Normal mood, normal affect  Skin: No rashes +jaundice/icterus      LABS:                        8.4    4.78  )-----------( 38       ( 08 Dec 2019 09:38 )             24.5     12-08    129<L>  |  92<L>  |  34<H>  ----------------------------<  197<H>  3.5   |  25  |  1.76<H>    Ca    10.3      08 Dec 2019 07:07    TPro  6.0  /  Alb  3.9  /  TBili  6.8<H>  /  DBili  x   /  AST  26  /  ALT  16  /  AlkPhos  123<H>  12-08    PT/INR - ( 07 Dec 2019 09:42 )   PT: 23.9 sec;   INR: 2.05 ratio         PTT - ( 07 Dec 2019 09:42 )  PTT:44.1 sec      RADIOLOGY & ADDITIONAL TESTS:

## 2019-12-08 NOTE — PROGRESS NOTE ADULT - SUBJECTIVE AND OBJECTIVE BOX
[facility] - Sushila Dhaliwal, Doug, Morenita, Ayo, Colleen Williamson  Office Number:  [phone]    Patient resting comfortably in bed in NAD.  Laying flat with no respiratory distress.  No complaints of chest pain, dyspnea, palpitations, PND, or orthopnea.    ROS: negative unless otherwise mentioned.    Telemetry:  off    MEDICATIONS  (STANDING):  atorvastatin 10 milliGRAM(s) Oral at bedtime  cefTRIAXone   IVPB 1000 milliGRAM(s) IV Intermittent every 24 hours  dextrose 5%. 1000 milliLiter(s) (50 mL/Hr) IV Continuous <Continuous>  dextrose 50% Injectable 12.5 Gram(s) IV Push once  dextrose 50% Injectable 25 Gram(s) IV Push once  dextrose 50% Injectable 25 Gram(s) IV Push once  insulin glargine Injectable (LANTUS) 20 Unit(s) SubCutaneous at bedtime  insulin lispro (HumaLOG) corrective regimen sliding scale   SubCutaneous three times a day before meals  insulin lispro (HumaLOG) corrective regimen sliding scale   SubCutaneous at bedtime  lactulose Syrup 30 Gram(s) Oral every 4 hours  midodrine. 20 milliGRAM(s) Oral three times a day  pantoprazole    Tablet 40 milliGRAM(s) Oral before breakfast  rifAXIMin 550 milliGRAM(s) Oral two times a day  sodium chloride 2 Gram(s) Oral four times a day  sucralfate 1 Gram(s) Oral two times a day  tamsulosin 0.4 milliGRAM(s) Oral at bedtime    MEDICATIONS  (PRN):  dextrose 40% Gel 15 Gram(s) Oral once PRN Blood Glucose LESS THAN 70 milliGRAM(s)/deciliter  glucagon  Injectable 1 milliGRAM(s) IntraMuscular once PRN Glucose LESS THAN 70 milligrams/deciliter      Allergies    codeine (Anaphylaxis)    Intolerances    NO  RED MEAT (Unknown)      Vital Signs Last 24 Hrs  T(C): 36.7 (08 Dec 2019 04:40), Max: 36.9 (07 Dec 2019 12:23)  T(F): 98 (08 Dec 2019 04:40), Max: 98.4 (07 Dec 2019 12:23)  HR: 74 (08 Dec 2019 04:40) (70 - 74)  BP: 113/62 (08 Dec 2019 04:40) (113/62 - 117/57)  BP(mean): --  RR: 17 (08 Dec 2019 04:40) (17 - 18)  SpO2: 97% (08 Dec 2019 04:40) (96% - 98%)    I&O's Summary    07 Dec 2019 07:01  -  08 Dec 2019 07:00  --------------------------------------------------------  IN: 0 mL / OUT: 1500 mL / NET: -1500 mL    08 Dec 2019 07:01  -  08 Dec 2019 10:40  --------------------------------------------------------  IN: 0 mL / OUT: 300 mL / NET: -300 mL        ON EXAM:    Constitutional: NAD, awake, obese  HEENT: Moist Mucous Membranes, Anicteric  Pulmonary: Non-labored, breath sounds are clear bilaterally, No wheezing, rales or rhonchi  Cardiovascular: Regular, S1 and S2, No murmurs, rubs, gallops or clicks  Gastrointestinal: Bowel Sounds present, soft, nontender.   Lymph: No peripheral edema. No lymphadenopathy.  Skin: No visible rashes or ulcers.  Psych:  Mood & affect appropriate for situation    LABS: All Labs Reviewed:                        8.4    4.78  )-----------( 38       ( 08 Dec 2019 09:38 )             24.5                         9.0    5.47  )-----------( 44       ( 07 Dec 2019 09:55 )             26.1                         9.2    5.93  )-----------( 46       ( 06 Dec 2019 07:44 )             26.3     08 Dec 2019 07:07    129    |  92     |  34     ----------------------------<  197    3.5     |  25     |  1.76   08 Dec 2019 01:48    129    |  90     |  38     ----------------------------<  224    3.5     |  23     |  1.80   07 Dec 2019 07:01    129    |  92     |  38     ----------------------------<  226    3.8     |  24     |  1.88     Ca    10.3       08 Dec 2019 07:07  Ca    10.8       08 Dec 2019 01:48  Ca    10.6       07 Dec 2019 07:01    TPro  6.0    /  Alb  3.9    /  TBili  6.8    /  DBili  x      /  AST  26     /  ALT  16     /  AlkPhos  123    08 Dec 2019 07:07  TPro  6.5    /  Alb  3.7    /  TBili  7.4    /  DBili  x      /  AST  26     /  ALT  16     /  AlkPhos  138    07 Dec 2019 07:01  TPro  6.6    /  Alb  3.9    /  TBili  8.2    /  DBili  x      /  AST  26     /  ALT  18     /  AlkPhos  116    06 Dec 2019 06:25    PT/INR - ( 07 Dec 2019 09:42 )   PT: 23.9 sec;   INR: 2.05 ratio         PTT - ( 07 Dec 2019 09:42 )  PTT:44.1 sec      Blood Culture: Organism --  Gram Stain Blood -- Gram Stain --  Specimen Source .Urine Clean Catch (Midstream)  Culture-Blood --

## 2019-12-08 NOTE — PROGRESS NOTE ADULT - SUBJECTIVE AND OBJECTIVE BOX
Patient is a 63y Male whom presented to the hospital with ckd and dena   pateint seen and examined nad , no fever , no chills      PAST MEDICAL & SURGICAL HISTORY:  GIB (gastrointestinal bleeding)  GERD with esophagitis: Gastritis &amp; Non Bleeding Ulcers  Hepatic encephalopathy  Obesity  Fatty liver disease, nonalcoholic  Renal stones: 25 years ago  Hypertension  Neuropathy  Hypercholesteremia  Diabetes  S/P cholecystectomy      MEDICATIONS  (STANDING):  dextrose 5%. 1000 milliLiter(s) (50 mL/Hr) IV Continuous <Continuous>  dextrose 50% Injectable 12.5 Gram(s) IV Push once  dextrose 50% Injectable 25 Gram(s) IV Push once  dextrose 50% Injectable 25 Gram(s) IV Push once  insulin lispro (HumaLOG) corrective regimen sliding scale   SubCutaneous three times a day before meals  insulin lispro (HumaLOG) corrective regimen sliding scale   SubCutaneous at bedtime      Allergies    codeine (Anaphylaxis)    Intolerances    NO  RED MEAT (Unknown)                                     SOCIAL HISTORY:  Denies ETOh,Smoking,     FAMILY HISTORY:  Family history of type 2 diabetes mellitus  Family history of hypertension  Family history of stomach cancer      REVIEW OF SYSTEMS:    CONSTITUTIONAL: No weakness, fevers or chills  RESPIRATORY: No cough, wheezing, hemoptysis; No shortness of breath  CARDIOVASCULAR: No chest pain or palpitations  GASTROINTESTINAL: No abdominal or epigastric pain. No nausea, vomiting,     No diarrhea or constipation. No melena   GENITOURINARY: No dysuria, frequency or hematuria  NEUROLOGICAL: No numbness or weakness  SKIN: dry                                                     8.4    4.78  )-----------( 38       ( 08 Dec 2019 09:38 )             24.5       CBC Full  -  ( 08 Dec 2019 09:38 )  WBC Count : 4.78 K/uL  RBC Count : 2.54 M/uL  Hemoglobin : 8.4 g/dL  Hematocrit : 24.5 %  Platelet Count - Automated : 38 K/uL  Mean Cell Volume : 96.5 fl  Mean Cell Hemoglobin : 33.1 pg  Mean Cell Hemoglobin Concentration : 34.3 gm/dL  Auto Neutrophil # : 2.63 K/uL  Auto Lymphocyte # : 1.77 K/uL  Auto Monocyte # : 0.33 K/uL  Auto Eosinophil # : 0.05 K/uL  Auto Basophil # : 0.00 K/uL  Auto Neutrophil % : 55.0 %  Auto Lymphocyte % : 37.0 %  Auto Monocyte % : 7.0 %  Auto Eosinophil % : 1.0 %  Auto Basophil % : 0.0 %      12-08    129<L>  |  92<L>  |  34<H>  ----------------------------<  197<H>  3.5   |  25  |  1.76<H>    Ca    10.3      08 Dec 2019 07:07    TPro  6.0  /  Alb  3.9  /  TBili  6.8<H>  /  DBili  x   /  AST  26  /  ALT  16  /  AlkPhos  123<H>  12-08      CAPILLARY BLOOD GLUCOSE      POCT Blood Glucose.: 238 mg/dL (08 Dec 2019 12:47)  POCT Blood Glucose.: 197 mg/dL (08 Dec 2019 08:53)  POCT Blood Glucose.: 243 mg/dL (07 Dec 2019 21:51)  POCT Blood Glucose.: 255 mg/dL (07 Dec 2019 17:39)      Vital Signs Last 24 Hrs  T(C): 37.1 (08 Dec 2019 12:20), Max: 37.1 (08 Dec 2019 12:20)  T(F): 98.8 (08 Dec 2019 12:20), Max: 98.8 (08 Dec 2019 12:20)  HR: 74 (08 Dec 2019 04:40) (70 - 74)  BP: 113/62 (08 Dec 2019 04:40) (113/62 - 117/57)  BP(mean): --  RR: 18 (08 Dec 2019 12:20) (17 - 18)  SpO2: 98% (08 Dec 2019 12:20) (96% - 98%)        PT/INR - ( 07 Dec 2019 09:42 )   PT: 23.9 sec;   INR: 2.05 ratio         PTT - ( 07 Dec 2019 09:42 )  PTT:44.1 sec              PHYSICAL EXAM:    Constitutional: NAD  HEENT: conjunctive   clear   Neck:  No JVD  Respiratory: CTAB  Cardiovascular: S1 and S2  Gastrointestinal: BS+, soft, NT/ND  Extremities: No peripheral edema  Neurological:  no focal deficits  Psychiatric: Normal mood, normal affect  Skin: dry   Access: Not applicable

## 2019-12-08 NOTE — PROGRESS NOTE ADULT - PROBLEM SELECTOR PLAN 2
- recent EGD 5/2019 non-bleeding duodenal ulcers and No evidence of varices   - MRI reviewed   - cirrhosis 2/2 NAFLD   - US Abd without ascites; CT abd without new gi pathology; no liver lesions  - continue xifaxan and lactulose as above   - Protonix 40mg PO QD   - heme following given increased indirect bili; care appreciated   - liver transplant evaluation in progress; appreciate hepatology input

## 2019-12-08 NOTE — PROGRESS NOTE ADULT - ASSESSMENT
63 year old male with HTN, DM2, non-alcoholic cirrhosis, who presents with altered mental status. We last saw him during a hospitalization in 10/2019, during which he was significantly volume overloaded and required iv diuresis.  He was initially confused though his mental status has improved. He remains with hyponatremia and high ammonia level.  He is currently being evaluated for liver transplant    Abnormal EKG  - EKG is notable for prolonged qtc which was still present on 11/30. Repeat ekg today.  - avoid qtc prolonging medications  - replete K to greater than 4  - check daily mg as well and replete to greater than 2     Diastolic HF   - no sign of acute ischemia  - no significant volume overload on exam. CT without pleural effusions or edema, though notable for possible chest wall hematoma.  - can hold diuretics.   - GI and renal follow up.  - TTE normal LV systolic function EF 65%; LVH, DD Grade I  - cath with normal filling pressures and no significant cad. continue with statin.    HTN  - BP on softer side, though has been better overall  - hold diuretics and anti-hypertensives  - midodrine as needed    - All other workup as per primary team  - Will follow with you. Further cardiac w/u as indicated by clinical course

## 2019-12-09 LAB
-  AMIKACIN: SIGNIFICANT CHANGE UP
-  AMIKACIN: SIGNIFICANT CHANGE UP
-  AMPICILLIN/SULBACTAM: SIGNIFICANT CHANGE UP
-  AMPICILLIN/SULBACTAM: SIGNIFICANT CHANGE UP
-  AMPICILLIN: SIGNIFICANT CHANGE UP
-  AMPICILLIN: SIGNIFICANT CHANGE UP
-  AZTREONAM: SIGNIFICANT CHANGE UP
-  AZTREONAM: SIGNIFICANT CHANGE UP
-  CEFAZOLIN: SIGNIFICANT CHANGE UP
-  CEFAZOLIN: SIGNIFICANT CHANGE UP
-  CEFEPIME: SIGNIFICANT CHANGE UP
-  CEFEPIME: SIGNIFICANT CHANGE UP
-  CEFOXITIN: SIGNIFICANT CHANGE UP
-  CEFOXITIN: SIGNIFICANT CHANGE UP
-  CEFTRIAXONE: SIGNIFICANT CHANGE UP
-  CEFTRIAXONE: SIGNIFICANT CHANGE UP
-  CIPROFLOXACIN: SIGNIFICANT CHANGE UP
-  CIPROFLOXACIN: SIGNIFICANT CHANGE UP
-  ERTAPENEM: SIGNIFICANT CHANGE UP
-  GENTAMICIN: SIGNIFICANT CHANGE UP
-  GENTAMICIN: SIGNIFICANT CHANGE UP
-  IMIPENEM: SIGNIFICANT CHANGE UP
-  IMIPENEM: SIGNIFICANT CHANGE UP
-  LEVOFLOXACIN: SIGNIFICANT CHANGE UP
-  LEVOFLOXACIN: SIGNIFICANT CHANGE UP
-  MEROPENEM: SIGNIFICANT CHANGE UP
-  MEROPENEM: SIGNIFICANT CHANGE UP
-  NITROFURANTOIN: SIGNIFICANT CHANGE UP
-  NITROFURANTOIN: SIGNIFICANT CHANGE UP
-  PIPERACILLIN/TAZOBACTAM: SIGNIFICANT CHANGE UP
-  PIPERACILLIN/TAZOBACTAM: SIGNIFICANT CHANGE UP
-  TIGECYCLINE: SIGNIFICANT CHANGE UP
-  TIGECYCLINE: SIGNIFICANT CHANGE UP
-  TOBRAMYCIN: SIGNIFICANT CHANGE UP
-  TOBRAMYCIN: SIGNIFICANT CHANGE UP
-  TRIMETHOPRIM/SULFAMETHOXAZOLE: SIGNIFICANT CHANGE UP
-  TRIMETHOPRIM/SULFAMETHOXAZOLE: SIGNIFICANT CHANGE UP
ALBUMIN SERPL ELPH-MCNC: 3.8 G/DL — SIGNIFICANT CHANGE UP (ref 3.3–5)
ALP SERPL-CCNC: 124 U/L — HIGH (ref 40–120)
ALT FLD-CCNC: 17 U/L — SIGNIFICANT CHANGE UP (ref 10–45)
AMMONIA BLD-MCNC: 130 UMOL/L — HIGH (ref 11–55)
ANION GAP SERPL CALC-SCNC: 16 MMOL/L — SIGNIFICANT CHANGE UP (ref 5–17)
ANION GAP SERPL CALC-SCNC: 16 MMOL/L — SIGNIFICANT CHANGE UP (ref 5–17)
AST SERPL-CCNC: 25 U/L — SIGNIFICANT CHANGE UP (ref 10–40)
BILIRUB SERPL-MCNC: 7.2 MG/DL — HIGH (ref 0.2–1.2)
BUN SERPL-MCNC: 38 MG/DL — HIGH (ref 7–23)
BUN SERPL-MCNC: 40 MG/DL — HIGH (ref 7–23)
CALCIUM SERPL-MCNC: 10.8 MG/DL — HIGH (ref 8.4–10.5)
CALCIUM SERPL-MCNC: 10.9 MG/DL — HIGH (ref 8.4–10.5)
CHLORIDE SERPL-SCNC: 90 MMOL/L — LOW (ref 96–108)
CHLORIDE SERPL-SCNC: 91 MMOL/L — LOW (ref 96–108)
CO2 SERPL-SCNC: 22 MMOL/L — SIGNIFICANT CHANGE UP (ref 22–31)
CO2 SERPL-SCNC: 24 MMOL/L — SIGNIFICANT CHANGE UP (ref 22–31)
COPPER UR-MCNC: 28 MCG/G CR — SIGNIFICANT CHANGE UP
CREAT SERPL-MCNC: 1.8 MG/DL — HIGH (ref 0.5–1.3)
CREAT SERPL-MCNC: 1.81 MG/DL — HIGH (ref 0.5–1.3)
CULTURE RESULTS: SIGNIFICANT CHANGE UP
DNA PLOIDY SPEC FC-IMP: SIGNIFICANT CHANGE UP
GLUCOSE BLDC GLUCOMTR-MCNC: 229 MG/DL — HIGH (ref 70–99)
GLUCOSE BLDC GLUCOMTR-MCNC: 235 MG/DL — HIGH (ref 70–99)
GLUCOSE BLDC GLUCOMTR-MCNC: 292 MG/DL — HIGH (ref 70–99)
GLUCOSE BLDC GLUCOMTR-MCNC: 293 MG/DL — HIGH (ref 70–99)
GLUCOSE SERPL-MCNC: 235 MG/DL — HIGH (ref 70–99)
GLUCOSE SERPL-MCNC: 250 MG/DL — HIGH (ref 70–99)
HCT VFR BLD CALC: 24.3 % — LOW (ref 39–50)
HGB BLD-MCNC: 8.4 G/DL — LOW (ref 13–17)
INR BLD: 2.09 RATIO — HIGH (ref 0.88–1.16)
MCHC RBC-ENTMCNC: 33.2 PG — SIGNIFICANT CHANGE UP (ref 27–34)
MCHC RBC-ENTMCNC: 34.6 GM/DL — SIGNIFICANT CHANGE UP (ref 32–36)
MCV RBC AUTO: 96 FL — SIGNIFICANT CHANGE UP (ref 80–100)
METHOD TYPE: SIGNIFICANT CHANGE UP
METHOD TYPE: SIGNIFICANT CHANGE UP
MTHFR GENE INTERPRETATION: SIGNIFICANT CHANGE UP
ORGANISM # SPEC MICROSCOPIC CNT: SIGNIFICANT CHANGE UP
PLATELET # BLD AUTO: 40 K/UL — LOW (ref 150–400)
POTASSIUM SERPL-MCNC: 3.7 MMOL/L — SIGNIFICANT CHANGE UP (ref 3.5–5.3)
POTASSIUM SERPL-MCNC: 4 MMOL/L — SIGNIFICANT CHANGE UP (ref 3.5–5.3)
POTASSIUM SERPL-SCNC: 3.7 MMOL/L — SIGNIFICANT CHANGE UP (ref 3.5–5.3)
POTASSIUM SERPL-SCNC: 4 MMOL/L — SIGNIFICANT CHANGE UP (ref 3.5–5.3)
PROT SERPL-MCNC: 6.4 G/DL — SIGNIFICANT CHANGE UP (ref 6–8.3)
PROTHROM AB SERPL-ACNC: 24.4 SEC — HIGH (ref 10–13.1)
PTR INTERPRETATION: SIGNIFICANT CHANGE UP
RBC # BLD: 2.53 M/UL — LOW (ref 4.2–5.8)
RBC # FLD: 18.3 % — HIGH (ref 10.3–14.5)
SODIUM SERPL-SCNC: 129 MMOL/L — LOW (ref 135–145)
SODIUM SERPL-SCNC: 130 MMOL/L — LOW (ref 135–145)
SPECIMEN SOURCE: SIGNIFICANT CHANGE UP
WBC # BLD: 5.96 K/UL — SIGNIFICANT CHANGE UP (ref 3.8–10.5)
WBC # FLD AUTO: 5.96 K/UL — SIGNIFICANT CHANGE UP (ref 3.8–10.5)

## 2019-12-09 PROCEDURE — 99232 SBSQ HOSP IP/OBS MODERATE 35: CPT

## 2019-12-09 PROCEDURE — 93010 ELECTROCARDIOGRAM REPORT: CPT

## 2019-12-09 RX ORDER — SODIUM CHLORIDE 9 MG/ML
2 INJECTION INTRAMUSCULAR; INTRAVENOUS; SUBCUTANEOUS THREE TIMES A DAY
Refills: 0 | Status: COMPLETED | OUTPATIENT
Start: 2019-12-09 | End: 2019-12-11

## 2019-12-09 RX ORDER — METOCLOPRAMIDE HCL 10 MG
10 TABLET ORAL ONCE
Refills: 0 | Status: COMPLETED | OUTPATIENT
Start: 2019-12-09 | End: 2019-12-09

## 2019-12-09 RX ADMIN — LACTULOSE 30 GRAM(S): 10 SOLUTION ORAL at 17:55

## 2019-12-09 RX ADMIN — Medication 10 MILLIGRAM(S): at 23:29

## 2019-12-09 RX ADMIN — CEFTRIAXONE 100 MILLIGRAM(S): 500 INJECTION, POWDER, FOR SOLUTION INTRAMUSCULAR; INTRAVENOUS at 20:44

## 2019-12-09 RX ADMIN — PANTOPRAZOLE SODIUM 40 MILLIGRAM(S): 20 TABLET, DELAYED RELEASE ORAL at 06:18

## 2019-12-09 RX ADMIN — INSULIN GLARGINE 20 UNIT(S): 100 INJECTION, SOLUTION SUBCUTANEOUS at 22:20

## 2019-12-09 RX ADMIN — Medication 3: at 17:57

## 2019-12-09 RX ADMIN — MIDODRINE HYDROCHLORIDE 20 MILLIGRAM(S): 2.5 TABLET ORAL at 05:32

## 2019-12-09 RX ADMIN — ATORVASTATIN CALCIUM 10 MILLIGRAM(S): 80 TABLET, FILM COATED ORAL at 22:20

## 2019-12-09 RX ADMIN — LACTULOSE 30 GRAM(S): 10 SOLUTION ORAL at 22:20

## 2019-12-09 RX ADMIN — LACTULOSE 30 GRAM(S): 10 SOLUTION ORAL at 13:00

## 2019-12-09 RX ADMIN — MIDODRINE HYDROCHLORIDE 20 MILLIGRAM(S): 2.5 TABLET ORAL at 12:56

## 2019-12-09 RX ADMIN — TAMSULOSIN HYDROCHLORIDE 0.4 MILLIGRAM(S): 0.4 CAPSULE ORAL at 22:20

## 2019-12-09 RX ADMIN — Medication 1 GRAM(S): at 17:56

## 2019-12-09 RX ADMIN — Medication 1 GRAM(S): at 05:31

## 2019-12-09 RX ADMIN — SODIUM CHLORIDE 2 GRAM(S): 9 INJECTION INTRAMUSCULAR; INTRAVENOUS; SUBCUTANEOUS at 22:20

## 2019-12-09 RX ADMIN — LACTULOSE 30 GRAM(S): 10 SOLUTION ORAL at 05:32

## 2019-12-09 RX ADMIN — LACTULOSE 30 GRAM(S): 10 SOLUTION ORAL at 09:06

## 2019-12-09 RX ADMIN — Medication 3: at 12:56

## 2019-12-09 RX ADMIN — Medication 2: at 09:06

## 2019-12-09 RX ADMIN — MIDODRINE HYDROCHLORIDE 20 MILLIGRAM(S): 2.5 TABLET ORAL at 17:56

## 2019-12-09 RX ADMIN — LACTULOSE 30 GRAM(S): 10 SOLUTION ORAL at 01:43

## 2019-12-09 NOTE — PROGRESS NOTE ADULT - ASSESSMENT
63 year old male with HTN, DM2, non-alcoholic cirrhosis, who presents with altered mental status. We last saw him during a hospitalization in 10/2019, during which he was significantly volume overloaded and required iv diuresis.  He was initially confused though his mental status has improved. He remains with hyponatremia and high ammonia level.  He is currently being evaluated for liver transplant    Abnormal EKG  - EKG is notable for prolonged qtc which was still present on 11/30. Repeat ekg today. Not done yesterday.  I ordered it for today  - avoid qtc prolonging medications  - replete K to greater than 4  - check daily mg as well and replete to greater than 2     Diastolic HF   - no sign of acute ischemia  - no significant volume overload on exam. CT without pleural effusions or edema, though notable for possible chest wall hematoma.  - can hold diuretics.   - GI and renal follow up.  - TTE normal LV systolic function EF 65%; LVH, DD Grade I  - cath with normal filling pressures and no significant cad. continue with statin.    HTN  - BP on softer side, though has been better overall  - hold diuretics and anti-hypertensives  - midodrine as needed    - All other workup as per primary team  - Will follow with you. Further cardiac w/u as indicated by clinical course

## 2019-12-09 NOTE — PROGRESS NOTE ADULT - ATTENDING COMMENTS
Hepatology Staff: Yomi Medina MD    I saw and examined the patient along with  Dr. Huang 12-09-19 @ 08:50.    Patient Medical Record, hospital course was reviewed and summarized as below:    Hemodynamic Status: stable  Vitals: Vital Signs Last 24 Hrs  T(C): 36.6 (09 Dec 2019 04:13), Max: 37.6 (08 Dec 2019 21:06)  T(F): 97.9 (09 Dec 2019 04:13), Max: 99.7 (08 Dec 2019 21:06)  HR: 76 (09 Dec 2019 04:13) (76 - 81)  BP: 122/70 (09 Dec 2019 04:13) (113/66 - 122/70)  RR: 18 (09 Dec 2019 04:13) (18 - 18)  SpO2: 97% (09 Dec 2019 04:13) (96% - 98%)    Antibiotics: IV Ceftriaxone for UTI  Diuretics: None  Labs:INR: 2.09 ratio (12-09-19 @ 08:21)  Creatinine, Serum: 1.81 mg/dL (12-09-19 @ 07:01)  Bilirubin Total, Serum: 7.2 mg/dL (12-09-19 @ 07:01)  Creatinine, Serum: 1.75 mg/dL (12-08-19 @ 18:36)      Imaging Studies:  I/O: I&O's Summary    08 Dec 2019 07:01  -  09 Dec 2019 07:00  --------------------------------------------------------  IN: 590 mL / OUT: 1350 mL / NET: -760 mL      Nutritional Status:   Albumin, Serum: 3.8 g/dL (12-09-19 @ 07:01)    Last 24 hour events: None    Recommendations: Creatinine is still elevated although stable., Would add IV Albumin 25% 25 g q 8hrs x 3. MRI negative for any focal lesion. Cath-mild nonconstructive CAD, no pulm htn. Should be OK to go to rehab ( pending PT recommendation). We will follow up an outpatient for completion of transplant evaluation.    Plan discussed with Primary team.

## 2019-12-09 NOTE — PROGRESS NOTE ADULT - SUBJECTIVE AND OBJECTIVE BOX
[facility] - Sushila Dhaliwal, Doug, Morenita, Ayo, Colleen Williamson  Office Number:  [phone]    Patient resting comfortably in bed in NAD.  Laying flat with no respiratory distress.  No complaints of chest pain, dyspnea, palpitations, PND, or orthopnea.    F/U for:  orthostatic hypotension    MEDICATIONS  (STANDING):  atorvastatin 10 milliGRAM(s) Oral at bedtime  cefTRIAXone   IVPB 1000 milliGRAM(s) IV Intermittent every 24 hours  dextrose 5%. 1000 milliLiter(s) (50 mL/Hr) IV Continuous <Continuous>  dextrose 50% Injectable 12.5 Gram(s) IV Push once  dextrose 50% Injectable 25 Gram(s) IV Push once  dextrose 50% Injectable 25 Gram(s) IV Push once  insulin glargine Injectable (LANTUS) 20 Unit(s) SubCutaneous at bedtime  insulin lispro (HumaLOG) corrective regimen sliding scale   SubCutaneous three times a day before meals  insulin lispro (HumaLOG) corrective regimen sliding scale   SubCutaneous at bedtime  lactulose Syrup 30 Gram(s) Oral every 4 hours  midodrine. 20 milliGRAM(s) Oral three times a day  pantoprazole    Tablet 40 milliGRAM(s) Oral before breakfast  rifAXIMin 550 milliGRAM(s) Oral two times a day  sucralfate 1 Gram(s) Oral two times a day  tamsulosin 0.4 milliGRAM(s) Oral at bedtime    MEDICATIONS  (PRN):  dextrose 40% Gel 15 Gram(s) Oral once PRN Blood Glucose LESS THAN 70 milliGRAM(s)/deciliter  glucagon  Injectable 1 milliGRAM(s) IntraMuscular once PRN Glucose LESS THAN 70 milligrams/deciliter      Allergies    codeine (Anaphylaxis)    Intolerances    NO  RED MEAT (Unknown)      Vital Signs Last 24 Hrs  T(C): 36.6 (09 Dec 2019 04:13), Max: 37.6 (08 Dec 2019 21:06)  T(F): 97.9 (09 Dec 2019 04:13), Max: 99.7 (08 Dec 2019 21:06)  HR: 76 (09 Dec 2019 04:13) (76 - 81)  BP: 122/70 (09 Dec 2019 04:13) (113/66 - 122/70)  BP(mean): --  RR: 18 (09 Dec 2019 04:13) (18 - 18)  SpO2: 97% (09 Dec 2019 04:13) (96% - 98%)    I&O's Summary    08 Dec 2019 07:01  -  09 Dec 2019 07:00  --------------------------------------------------------  IN: 590 mL / OUT: 1350 mL / NET: -760 mL        ON EXAM:    Constitutional: NAD, awake, obese  HEENT: Moist Mucous Membranes, Anicteric  Pulmonary: Non-labored, breath sounds are clear bilaterally, No wheezing, rales or rhonchi  Cardiovascular: Regular, S1 and S2, No murmurs, rubs, gallops or clicks  Gastrointestinal: Bowel Sounds present, soft, nontender.   Lymph: No peripheral edema. No lymphadenopathy.  Skin: No visible rashes or ulcers.  Psych:  Mood & affect appropriate for situation    LABS: All Labs Reviewed:                        8.4    4.78  )-----------( 38       ( 08 Dec 2019 09:38 )             24.5                         9.0    5.47  )-----------( 44       ( 07 Dec 2019 09:55 )             26.1     09 Dec 2019 07:01    129    |  91     |  38     ----------------------------<  235    3.7     |  22     |  1.81   08 Dec 2019 18:36    129    |  91     |  37     ----------------------------<  258    3.7     |  22     |  1.75   08 Dec 2019 07:07    129    |  92     |  34     ----------------------------<  197    3.5     |  25     |  1.76     Ca    10.8       09 Dec 2019 07:01  Ca    10.8       08 Dec 2019 18:36  Ca    10.3       08 Dec 2019 07:07    TPro  6.4    /  Alb  3.8    /  TBili  7.2    /  DBili  x      /  AST  25     /  ALT  17     /  AlkPhos  124    09 Dec 2019 07:01  TPro  6.0    /  Alb  3.9    /  TBili  6.8    /  DBili  x      /  AST  26     /  ALT  16     /  AlkPhos  123    08 Dec 2019 07:07  TPro  6.5    /  Alb  3.7    /  TBili  7.4    /  DBili  x      /  AST  26     /  ALT  16     /  AlkPhos  138    07 Dec 2019 07:01    PT/INR - ( 07 Dec 2019 09:42 )   PT: 23.9 sec;   INR: 2.05 ratio         PTT - ( 07 Dec 2019 09:42 )  PTT:44.1 sec      Blood Culture: Organism --  Gram Stain Blood -- Gram Stain --  Specimen Source .Urine Clean Catch (Midstream)  Culture-Blood --

## 2019-12-09 NOTE — PROGRESS NOTE ADULT - ASSESSMENT
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    #Presumed JEAN cirrhosis, MELD-Na 12/9 31      - varices: none on EGD outside in 10/2019      - HE: present, on lactulose and Xifaxan       - ascites: none currently, but history of ascites with SBP      - HCC: none on US 11/27, AFP 23 in 10/19; MRI w/o lesions from 12/6      - Vascular assessment: poor US doppler exam for eval of vessels due to body habitus       - Liver Workup: HAV immune, HBV non-immune, HCV neg, HIV neg, iron studies consistent wiith AoCD, RPR neg, QG neg, CMV neg, EBV IgG pos only, HSV1 pos, ASMA neg, AMA neg, ceruloplasmin LOW 12      - Age appropriate screening: colonoscopy 5-6 months ago per patient      - Dental evaluation: >1 year ago, no loose teeth per patient      - Cardiac evaluation: LHC completed 12/5, mild-mod non-occlusive CAD with low filling pressures, no interventions needed       - Blood type A+, Ab neg      - Previous transplant workup: none      - Social: retired, previous , lives in [place] with wife and son, previously highly functional (before October), Insurance -Medicare HMO  # MISA, Cr stable, likely pre-renal vs HRS; urine sodium still low, likely HRS  # Chest wall hematoma  # T2DM  # Hypertension (currently normotensive)    Recs:  - give 3 more doses IV albumin prior to discharge  - followup PT recs/discharge to rehab  - continue midodrine 20mg TID  - hold diuretics  - continue lactulose 3-4 times daily, xifaxan bid  - urine copper (24) to rule out [place]  - EGD/colon report in the chart, need to be scanned in  - patient to be discussed at transplant evaluation committee, followup with Dr. Medina in clinic

## 2019-12-09 NOTE — PROGRESS NOTE ADULT - SUBJECTIVE AND OBJECTIVE BOX
Chief Complaint:  Patient is a 64y old  Male who presents with a chief complaint of difficulty to ambulate/ weakness (09 Dec 2019 07:58)      Interval Events:     Allergies:  codeine (Anaphylaxis)  NO  RED MEAT (Unknown)      Hospital Medications:  atorvastatin 10 milliGRAM(s) Oral at bedtime  cefTRIAXone   IVPB 1000 milliGRAM(s) IV Intermittent every 24 hours  dextrose 40% Gel 15 Gram(s) Oral once PRN  dextrose 5%. 1000 milliLiter(s) IV Continuous <Continuous>  dextrose 50% Injectable 12.5 Gram(s) IV Push once  dextrose 50% Injectable 25 Gram(s) IV Push once  dextrose 50% Injectable 25 Gram(s) IV Push once  glucagon  Injectable 1 milliGRAM(s) IntraMuscular once PRN  insulin glargine Injectable (LANTUS) 20 Unit(s) SubCutaneous at bedtime  insulin lispro (HumaLOG) corrective regimen sliding scale   SubCutaneous three times a day before meals  insulin lispro (HumaLOG) corrective regimen sliding scale   SubCutaneous at bedtime  lactulose Syrup 30 Gram(s) Oral every 4 hours  midodrine. 20 milliGRAM(s) Oral three times a day  pantoprazole    Tablet 40 milliGRAM(s) Oral before breakfast  rifAXIMin 550 milliGRAM(s) Oral two times a day  sucralfate 1 Gram(s) Oral two times a day  tamsulosin 0.4 milliGRAM(s) Oral at bedtime      PMHX/PSHX:  GIB (gastrointestinal bleeding)  GERD with esophagitis  Hepatic encephalopathy  Obesity  Fatty liver disease, nonalcoholic  Renal stones  Hypertension  Neuropathy  Hypercholesteremia  Diabetes  S/P cholecystectomy  No significant past surgical history      Family history:  Family history of type 2 diabetes mellitus  Family history of hypertension  Family history of stomach cancer  No pertinent family history in first degree relatives      ROS:     General:  No wt loss, fevers, chills, night sweats, fatigue,   Eyes:  Good vision, no reported pain  ENT:  No sore throat, pain, runny nose, dysphagia  CV:  No pain, palpitations, hypo/hypertension  Resp:  No dyspnea, cough, tachypnea, wheezing  GI:  See HPI  :  No pain, bleeding, incontinence, nocturia  Muscle:  No pain, weakness  Neuro:  No weakness, tingling, memory problems  Psych:  No fatigue, insomnia, mood problems, depression  Endocrine:  No polyuria, polydipsia, cold/heat intolerance  Heme:  No petechiae, ecchymosis, easy bruisability  Skin:  No rash, edema      PHYSICAL EXAM:     GENERAL:  Appears stated age, well-groomed, well-nourished, no distress  HEENT:  NC/AT,  conjunctivae clear, sclera -anicteric  CHEST:  Full & symmetric excursion, no increased effort, breath sounds clear  HEART:  Regular rhythm, S1, S2, no murmur/rub/S3/S4,  no edema  ABDOMEN:  Soft, non-tender, non-distended, normoactive bowel sounds,  no masses ,no hepato-splenomegaly,   EXTREMITIES:  no cyanosis,clubbing or edema  SKIN:  No rash/erythema/ecchymoses/petechiae/wounds/abscess/warm/dry  NEURO:  Alert, oriented    Vital Signs:  Vital Signs Last 24 Hrs  T(C): 36.6 (09 Dec 2019 04:13), Max: 37.6 (08 Dec 2019 21:06)  T(F): 97.9 (09 Dec 2019 04:13), Max: 99.7 (08 Dec 2019 21:06)  HR: 76 (09 Dec 2019 04:13) (76 - 81)  BP: 122/70 (09 Dec 2019 04:13) (113/66 - 122/70)  BP(mean): --  RR: 18 (09 Dec 2019 04:13) (18 - 18)  SpO2: 97% (09 Dec 2019 04:13) (96% - 98%)  Daily     Daily     LABS:                        8.4    5.96  )-----------( 40       ( 09 Dec 2019 07:43 )             24.3     12-09    129<L>  |  91<L>  |  38<H>  ----------------------------<  235<H>  3.7   |  22  |  1.81<H>    Ca    10.8<H>      09 Dec 2019 07:01    TPro  6.4  /  Alb  3.8  /  TBili  7.2<H>  /  DBili  x   /  AST  25  /  ALT  17  /  AlkPhos  124<H>  12-09    LIVER FUNCTIONS - ( 09 Dec 2019 07:01 )  Alb: 3.8 g/dL / Pro: 6.4 g/dL / ALK PHOS: 124 U/L / ALT: 17 U/L / AST: 25 U/L / GGT: x           PT/INR - ( 07 Dec 2019 09:42 )   PT: 23.9 sec;   INR: 2.05 ratio         PTT - ( 07 Dec 2019 09:42 )  PTT:44.1 sec        Imaging: Chief Complaint:  Patient is a 64y old  Male who presents with a chief complaint of difficulty to ambulate/ weakness (09 Dec 2019 07:58)      Interval Events: No adverse events over the weekend.  Pt ready for discharge.    Allergies:  codeine (Anaphylaxis)  NO  RED MEAT (Unknown)      Hospital Medications:  atorvastatin 10 milliGRAM(s) Oral at bedtime  cefTRIAXone   IVPB 1000 milliGRAM(s) IV Intermittent every 24 hours  dextrose 40% Gel 15 Gram(s) Oral once PRN  dextrose 5%. 1000 milliLiter(s) IV Continuous <Continuous>  dextrose 50% Injectable 12.5 Gram(s) IV Push once  dextrose 50% Injectable 25 Gram(s) IV Push once  dextrose 50% Injectable 25 Gram(s) IV Push once  glucagon  Injectable 1 milliGRAM(s) IntraMuscular once PRN  insulin glargine Injectable (LANTUS) 20 Unit(s) SubCutaneous at bedtime  insulin lispro (HumaLOG) corrective regimen sliding scale   SubCutaneous three times a day before meals  insulin lispro (HumaLOG) corrective regimen sliding scale   SubCutaneous at bedtime  lactulose Syrup 30 Gram(s) Oral every 4 hours  midodrine. 20 milliGRAM(s) Oral three times a day  pantoprazole    Tablet 40 milliGRAM(s) Oral before breakfast  rifAXIMin 550 milliGRAM(s) Oral two times a day  sucralfate 1 Gram(s) Oral two times a day  tamsulosin 0.4 milliGRAM(s) Oral at bedtime      PMHX/PSHX:  GIB (gastrointestinal bleeding)  GERD with esophagitis  Hepatic encephalopathy  Obesity  Fatty liver disease, nonalcoholic  Renal stones  Hypertension  Neuropathy  Hypercholesteremia  Diabetes  S/P cholecystectomy  No significant past surgical history      Family history:  Family history of type 2 diabetes mellitus  Family history of hypertension  Family history of stomach cancer  No pertinent family history in first degree relatives      ROS:     General:  No wt loss, fevers, chills, night sweats, fatigue,   Eyes:  Good vision, no reported pain  ENT:  No sore throat, pain, runny nose, dysphagia  CV:  No pain, palpitations, hypo/hypertension  Resp:  No dyspnea, cough, tachypnea, wheezing  GI:  See HPI  :  No pain, bleeding, incontinence, nocturia  Muscle:  No pain, weakness  Neuro:  No weakness, tingling, memory problems  Psych:  No fatigue, insomnia, mood problems, depression  Endocrine:  No polyuria, polydipsia, cold/heat intolerance  Heme:  No petechiae, ecchymosis, easy bruisability  Skin:  No rash, edema      PHYSICAL EXAM:     GENERAL: NAD  HEENT:  NC/AT, scleral icterus  CHEST:  Full & symmetric excursion, no increased effort  ABDOMEN:  Soft, non-tender, non-distended, +BS  EXTREMITIES:  no edema  SKIN:  No rash  NEURO:  Alert      Vital Signs:  Vital Signs Last 24 Hrs  T(C): 36.6 (09 Dec 2019 04:13), Max: 37.6 (08 Dec 2019 21:06)  T(F): 97.9 (09 Dec 2019 04:13), Max: 99.7 (08 Dec 2019 21:06)  HR: 76 (09 Dec 2019 04:13) (76 - 81)  BP: 122/70 (09 Dec 2019 04:13) (113/66 - 122/70)  BP(mean): --  RR: 18 (09 Dec 2019 04:13) (18 - 18)  SpO2: 97% (09 Dec 2019 04:13) (96% - 98%)  Daily     Daily     LABS:                        8.4    5.96  )-----------( 40       ( 09 Dec 2019 07:43 )             24.3     12-09    129<L>  |  91<L>  |  38<H>  ----------------------------<  235<H>  3.7   |  22  |  1.81<H>    Ca    10.8<H>      09 Dec 2019 07:01    TPro  6.4  /  Alb  3.8  /  TBili  7.2<H>  /  DBili  x   /  AST  25  /  ALT  17  /  AlkPhos  124<H>  12-09    LIVER FUNCTIONS - ( 09 Dec 2019 07:01 )  Alb: 3.8 g/dL / Pro: 6.4 g/dL / ALK PHOS: 124 U/L / ALT: 17 U/L / AST: 25 U/L / GGT: x           PT/INR - ( 07 Dec 2019 09:42 )   PT: 23.9 sec;   INR: 2.05 ratio         PTT - ( 07 Dec 2019 09:42 )  PTT:44.1 sec        Imaging:    reviewed

## 2019-12-09 NOTE — PROGRESS NOTE ADULT - SUBJECTIVE AND OBJECTIVE BOX
INTERVAL HPI/OVERNIGHT EVENTS:    pt seen and examined  no abd pain nausea or vomiting     MEDICATIONS  (STANDING):  atorvastatin 10 milliGRAM(s) Oral at bedtime  cefTRIAXone   IVPB 1000 milliGRAM(s) IV Intermittent every 24 hours  dextrose 5%. 1000 milliLiter(s) (50 mL/Hr) IV Continuous <Continuous>  dextrose 50% Injectable 12.5 Gram(s) IV Push once  dextrose 50% Injectable 25 Gram(s) IV Push once  dextrose 50% Injectable 25 Gram(s) IV Push once  insulin glargine Injectable (LANTUS) 20 Unit(s) SubCutaneous at bedtime  insulin lispro (HumaLOG) corrective regimen sliding scale   SubCutaneous three times a day before meals  insulin lispro (HumaLOG) corrective regimen sliding scale   SubCutaneous at bedtime  lactulose Syrup 30 Gram(s) Oral every 4 hours  midodrine. 20 milliGRAM(s) Oral three times a day  pantoprazole    Tablet 40 milliGRAM(s) Oral before breakfast  rifAXIMin 550 milliGRAM(s) Oral two times a day  sucralfate 1 Gram(s) Oral two times a day  tamsulosin 0.4 milliGRAM(s) Oral at bedtime    MEDICATIONS  (PRN):  dextrose 40% Gel 15 Gram(s) Oral once PRN Blood Glucose LESS THAN 70 milliGRAM(s)/deciliter  glucagon  Injectable 1 milliGRAM(s) IntraMuscular once PRN Glucose LESS THAN 70 milligrams/deciliter      Allergies    codeine (Anaphylaxis)    Intolerances    NO  RED MEAT (Unknown)      Review of Systems:    General:  No wt loss, fevers, chills, night sweats,fatigue,   Eyes:  Good vision, no reported pain  ENT:  No sore throat, pain, runny nose, dysphagia  CV:  No pain, palpitations, hypo/hypertension  Resp:  No dyspnea, cough, tachypnea, wheezing  GI:  No pain, No nausea, No vomiting, No diarrhea, No constipation, No weight loss, No fever, No pruritis, No rectal bleeding, No melena, No dysphagia  :  No pain, bleeding, incontinence, nocturia  Muscle:  No pain, weakness  Neuro:  No weakness, tingling, memory problems  Psych:  No fatigue, insomnia, mood problems, depression  Endocrine:  No polyuria, polydypsia, cold/heat intolerance  Heme:  No petechiae, ecchymosis, easy bruisability  Skin:  No rash, tattoos, scars, edema      Vital Signs Last 24 Hrs  T(C): 37 (09 Dec 2019 12:26), Max: 37.6 (08 Dec 2019 21:06)  T(F): 98.6 (09 Dec 2019 12:26), Max: 99.7 (08 Dec 2019 21:06)  HR: 76 (09 Dec 2019 04:13) (76 - 81)  BP: 122/70 (09 Dec 2019 04:13) (113/66 - 122/70)  BP(mean): --  RR: 18 (09 Dec 2019 12:26) (18 - 18)  SpO2: 98% (09 Dec 2019 12:26) (96% - 98%)    PHYSICAL EXAM:    Constitutional: NAD, well-developed  HEENT: EOMI, throat clear  Neck: No LAD, supple  Respiratory: CTA and P  Cardiovascular: S1 and S2, RRR, no M  Gastrointestinal: BS+, soft, NT/ND, neg HSM,  Extremities: No peripheral edema, neg clubing, cyanosis  Vascular: 2+ peripheral pulses  Neurological: A/O x 3, no focal deficits  Psychiatric: Normal mood, normal affect  Skin: No rashes      LABS:                        8.4    5.96  )-----------( 40       ( 09 Dec 2019 07:43 )             24.3     12-09    129<L>  |  91<L>  |  38<H>  ----------------------------<  235<H>  3.7   |  22  |  1.81<H>    Ca    10.8<H>      09 Dec 2019 07:01    TPro  6.4  /  Alb  3.8  /  TBili  7.2<H>  /  DBili  x   /  AST  25  /  ALT  17  /  AlkPhos  124<H>  12-09    PT/INR - ( 09 Dec 2019 08:21 )   PT: 24.4 sec;   INR: 2.09 ratio               RADIOLOGY & ADDITIONAL TESTS:

## 2019-12-09 NOTE — PROGRESS NOTE ADULT - SUBJECTIVE AND OBJECTIVE BOX
Patient is a 63y Male whom presented to the hospital with ckd and dena   pateint seen and examined nad , no fever , no chills      PAST MEDICAL & SURGICAL HISTORY:  GIB (gastrointestinal bleeding)  GERD with esophagitis: Gastritis &amp; Non Bleeding Ulcers  Hepatic encephalopathy  Obesity  Fatty liver disease, nonalcoholic  Renal stones: 25 years ago  Hypertension  Neuropathy  Hypercholesteremia  Diabetes  S/P cholecystectomy      MEDICATIONS  (STANDING):  dextrose 5%. 1000 milliLiter(s) (50 mL/Hr) IV Continuous <Continuous>  dextrose 50% Injectable 12.5 Gram(s) IV Push once  dextrose 50% Injectable 25 Gram(s) IV Push once  dextrose 50% Injectable 25 Gram(s) IV Push once  insulin lispro (HumaLOG) corrective regimen sliding scale   SubCutaneous three times a day before meals  insulin lispro (HumaLOG) corrective regimen sliding scale   SubCutaneous at bedtime      Allergies    codeine (Anaphylaxis)    Intolerances    NO  RED MEAT (Unknown)                                     SOCIAL HISTORY:  Denies ETOh,Smoking,     FAMILY HISTORY:  Family history of type 2 diabetes mellitus  Family history of hypertension  Family history of stomach cancer      REVIEW OF SYSTEMS:    CONSTITUTIONAL: No weakness, fevers or chills  RESPIRATORY: No cough, wheezing, hemoptysis; No shortness of breath  CARDIOVASCULAR: No chest pain or palpitations  GASTROINTESTINAL: No abdominal or epigastric pain. No nausea, vomiting,     No diarrhea or constipation. No melena   GENITOURINARY: No dysuria, frequency or hematuria  NEUROLOGICAL: No numbness or weakness  SKIN: dry                                                                    8.4    5.96  )-----------( 40       ( 09 Dec 2019 07:43 )             24.3       CBC Full  -  ( 09 Dec 2019 07:43 )  WBC Count : 5.96 K/uL  RBC Count : 2.53 M/uL  Hemoglobin : 8.4 g/dL  Hematocrit : 24.3 %  Platelet Count - Automated : 40 K/uL  Mean Cell Volume : 96.0 fl  Mean Cell Hemoglobin : 33.2 pg  Mean Cell Hemoglobin Concentration : 34.6 gm/dL  Auto Neutrophil # : x  Auto Lymphocyte # : x  Auto Monocyte # : x  Auto Eosinophil # : x  Auto Basophil # : x  Auto Neutrophil % : x  Auto Lymphocyte % : x  Auto Monocyte % : x  Auto Eosinophil % : x  Auto Basophil % : x      12-09    129<L>  |  91<L>  |  38<H>  ----------------------------<  235<H>  3.7   |  22  |  1.81<H>    Ca    10.8<H>      09 Dec 2019 07:01    TPro  6.4  /  Alb  3.8  /  TBili  7.2<H>  /  DBili  x   /  AST  25  /  ALT  17  /  AlkPhos  124<H>  12-09      CAPILLARY BLOOD GLUCOSE      POCT Blood Glucose.: 293 mg/dL (09 Dec 2019 17:57)  POCT Blood Glucose.: 292 mg/dL (09 Dec 2019 12:50)  POCT Blood Glucose.: 235 mg/dL (09 Dec 2019 08:47)  POCT Blood Glucose.: 240 mg/dL (08 Dec 2019 22:13)      Vital Signs Last 24 Hrs  T(C): 37 (09 Dec 2019 12:26), Max: 37.6 (08 Dec 2019 21:06)  T(F): 98.6 (09 Dec 2019 12:26), Max: 99.7 (08 Dec 2019 21:06)  HR: 76 (09 Dec 2019 04:13) (76 - 81)  BP: 122/70 (09 Dec 2019 04:13) (113/66 - 122/70)  BP(mean): --  RR: 18 (09 Dec 2019 12:26) (18 - 18)  SpO2: 98% (09 Dec 2019 12:26) (96% - 98%)        PT/INR - ( 09 Dec 2019 08:21 )   PT: 24.4 sec;   INR: 2.09 ratio               PT/INR - ( 07 Dec 2019 09:42 )   PT: 23.9 sec;   INR: 2.05 ratio         PTT - ( 07 Dec 2019 09:42 )  PTT:44.1 sec              PHYSICAL EXAM:    Constitutional: NAD  HEENT: conjunctive   clear   Neck:  No JVD  Respiratory: CTAB  Cardiovascular: S1 and S2  Gastrointestinal: BS+, soft, NT/ND  Extremities: No peripheral edema  Neurological:  no focal deficits  Psychiatric: Normal mood, normal affect  Skin: dry   Access: Not applicable

## 2019-12-09 NOTE — PROGRESS NOTE ADULT - SUBJECTIVE AND OBJECTIVE BOX
SUBJECTIVE / OVERNIGHT EVENTS: pt denies headache, nausea,v   chest pain, shortness of breath ,     MEDICATIONS  (STANDING):  atorvastatin 10 milliGRAM(s) Oral at bedtime  dextrose 5%. 1000 milliLiter(s) (50 mL/Hr) IV Continuous <Continuous>  dextrose 50% Injectable 12.5 Gram(s) IV Push once  dextrose 50% Injectable 25 Gram(s) IV Push once  dextrose 50% Injectable 25 Gram(s) IV Push once  insulin glargine Injectable (LANTUS) 20 Unit(s) SubCutaneous at bedtime  insulin lispro (HumaLOG) corrective regimen sliding scale   SubCutaneous three times a day before meals  insulin lispro (HumaLOG) corrective regimen sliding scale   SubCutaneous at bedtime  lactulose Syrup 30 Gram(s) Oral every 4 hours  midodrine. 20 milliGRAM(s) Oral three times a day  pantoprazole    Tablet 40 milliGRAM(s) Oral before breakfast  rifAXIMin 550 milliGRAM(s) Oral two times a day  sodium chloride 2 Gram(s) Oral three times a day  sucralfate 1 Gram(s) Oral two times a day  tamsulosin 0.4 milliGRAM(s) Oral at bedtime    MEDICATIONS  (PRN):  dextrose 40% Gel 15 Gram(s) Oral once PRN Blood Glucose LESS THAN 70 milliGRAM(s)/deciliter  glucagon  Injectable 1 milliGRAM(s) IntraMuscular once PRN Glucose LESS THAN 70 milligrams/deciliter    Vital Signs Last 24 Hrs  T(C): 36.9 (09 Dec 2019 21:17), Max: 37 (09 Dec 2019 12:26)  T(F): 98.4 (09 Dec 2019 21:17), Max: 98.6 (09 Dec 2019 12:26)  HR: 69 (09 Dec 2019 21:17) (69 - 77)  BP: 127/70 (09 Dec 2019 21:17) (113/66 - 127/70)  BP(mean): --  RR: 17 (09 Dec 2019 21:17) (17 - 18)  SpO2: 96% (09 Dec 2019 21:17) (96% - 98%)    Constitutional: No fever, fatigue  Skin: No rash.  Eyes: No recent vision problems or eye pain.  ENT: No congestion, ear pain, or sore throat.  Cardiovascular: No chest pain or palpation.  Respiratory: No cough, shortness of breath, congestion, or wheezing.  Gastrointestinal: No abdominal pain, nausea, vomiting, or diarrhea.  Genitourinary: No dysuria.  Musculoskeletal: No joint swelling.  Neurologic: No headache.    PHYSICAL EXAM:  GENERAL: NAD  EYES: EOMI, PERRLA  NECK: Supple, No JVD  CHEST/LUNG: dec breath sounds at bases   HEART:  S1 , S2 +  ABDOMEN: soft , bs+, mild distension + , no tenderness  EXTREMITIES:  no edema  NEUROLOGY:alert awake oriented   flap tremor+    LABS:      130<L>  |  90<L>  |  40<H>  ----------------------------<  250<H>  4.0   |  24  |  1.80<H>    Ca    10.9<H>      09 Dec 2019 22:28    TPro  6.4  /  Alb  3.8  /  TBili  7.2<H>  /  DBili      /  AST  25  /  ALT  17  /  AlkPhos  124<H>      Creatinine Trend: 1.80 <--, 1.81 <--, 1.75 <--, 1.76 <--, 1.80 <--, 1.88 <--, 1.64 <--, 1.70 <--, 1.74 <--, 1.99 <--, 1.96 <--, 2.22 <--                        8.4    5.96  )-----------( 40       ( 09 Dec 2019 07:43 )             24.3     Urine Studies:  Urinalysis Basic - ( 04 Dec 2019 16:50 )    Color: Yellow / Appearance: Slightly Turbid / S.018 / pH:   Gluc:  / Ketone: Negative  / Bili: Negative / Urobili: Negative   Blood:  / Protein: 100 / Nitrite: Negative   Leuk Esterase: Large / RBC: 95 /hpf / WBC 93 /HPF   Sq Epi:  / Non Sq Epi: 0 /hpf / Bacteria: Many      Sodium, Random Urine: <35 mmol/L ( @ 16:31)  Creatinine, Random Urine: 118 mg/dL ( @ 03:54)  Protein/Creatinine Ratio Calculation: 0.9 Ratio ( @ 03:54)          LIVER FUNCTIONS - ( 09 Dec 2019 07:01 )  Alb: 3.8 g/dL / Pro: 6.4 g/dL / ALK PHOS: 124 U/L / ALT: 17 U/L / AST: 25 U/L / GGT: x           PT/INR - ( 09 Dec 2019 08:21 )   PT: 24.4 sec;   INR: 2.09 ratio

## 2019-12-09 NOTE — PROGRESS NOTE ADULT - ASSESSMENT
· Assessment		  1. Chronic Normocytic Anemia    -- H/H stable  -- Undetectable Hapto sec to Liver Disease, remainder of hemolytic labs neg  -- Iron Studies c/w Anemia of Chronic Inflammation  -- No B12/Folate Deficiency  -- FOBT pos in the past  -- monitor Counts    2. IgG Kappa MGUS(Low Risk)    -- M spike only 200mg/dL  -- likely MGUS  -- bence jonce protein not present  -- monitor clinically for now, repeat levels in 3-4 months    3. cirrhosis   -- per GI and hepatology       4. thrombocytopenia sec to cirrhosis     -- platelet count stable  -- trend  --transfuse for < 10,000 or <50,000 for procedures or evidence of bleeding    Will follow, [phone]

## 2019-12-09 NOTE — PROGRESS NOTE ADULT - SUBJECTIVE AND OBJECTIVE BOX
Pt seen, feeling well, no complaints    MEDICATIONS  (STANDING):  atorvastatin 10 milliGRAM(s) Oral at bedtime  cefTRIAXone   IVPB 1000 milliGRAM(s) IV Intermittent every 24 hours  dextrose 5%. 1000 milliLiter(s) (50 mL/Hr) IV Continuous <Continuous>  dextrose 50% Injectable 12.5 Gram(s) IV Push once  dextrose 50% Injectable 25 Gram(s) IV Push once  dextrose 50% Injectable 25 Gram(s) IV Push once  insulin glargine Injectable (LANTUS) 20 Unit(s) SubCutaneous at bedtime  insulin lispro (HumaLOG) corrective regimen sliding scale   SubCutaneous three times a day before meals  insulin lispro (HumaLOG) corrective regimen sliding scale   SubCutaneous at bedtime  lactulose Syrup 30 Gram(s) Oral every 4 hours  midodrine. 20 milliGRAM(s) Oral three times a day  pantoprazole    Tablet 40 milliGRAM(s) Oral before breakfast  rifAXIMin 550 milliGRAM(s) Oral two times a day  sucralfate 1 Gram(s) Oral two times a day  tamsulosin 0.4 milliGRAM(s) Oral at bedtime    MEDICATIONS  (PRN):  dextrose 40% Gel 15 Gram(s) Oral once PRN Blood Glucose LESS THAN 70 milliGRAM(s)/deciliter  glucagon  Injectable 1 milliGRAM(s) IntraMuscular once PRN Glucose LESS THAN 70 milligrams/deciliter      ROS  No fever, sweats, chills  No epistaxis, HA, sore throat  No CP, SOB, cough, sputum  No n/v/d, abd pain, melena, hematochezia  no rash  No anxiety  No back pain, joint pain  No bleeding, bruising  No dysuria, hematuria    Vital Signs Last 24 Hrs  T(C): 37 (09 Dec 2019 12:26), Max: 37.6 (08 Dec 2019 21:06)  T(F): 98.6 (09 Dec 2019 12:26), Max: 99.7 (08 Dec 2019 21:06)  HR: 76 (09 Dec 2019 04:13) (76 - 81)  BP: 122/70 (09 Dec 2019 04:13) (113/66 - 122/70)  BP(mean): --  RR: 18 (09 Dec 2019 12:26) (18 - 18)  SpO2: 98% (09 Dec 2019 12:26) (96% - 98%)    PE  NAD  Awake, alert  icteric, MMM  RRR  CTAB ant chest  Abd soft, NT, ND  No c/c/e  No rash grossly  FROM                          8.4    5.96  )-----------( 40       ( 09 Dec 2019 07:43 )             24.3       12-09    129<L>  |  91<L>  |  38<H>  ----------------------------<  235<H>  3.7   |  22  |  1.81<H>    Ca    10.8<H>      09 Dec 2019 07:01    TPro  6.4  /  Alb  3.8  /  TBili  7.2<H>  /  DBili  x   /  AST  25  /  ALT  17  /  AlkPhos  124<H>  12-09

## 2019-12-09 NOTE — PROGRESS NOTE ADULT - PROBLEM SELECTOR PLAN 1
serum creatinine is 1.81   , approximating GFR is increased    ml/min.   There is  progression . No uremic symptoms    pos evidence of anemia .  Fluid status stable.  Will continue to avoid nephrotoxic drugs.  Patient remains asymptomatic.   Continue current therapy.

## 2019-12-10 LAB
ANION GAP SERPL CALC-SCNC: 18 MMOL/L — HIGH (ref 5–17)
BUN SERPL-MCNC: 43 MG/DL — HIGH (ref 7–23)
CALCIUM SERPL-MCNC: 10.5 MG/DL — SIGNIFICANT CHANGE UP (ref 8.4–10.5)
CHLORIDE SERPL-SCNC: 91 MMOL/L — LOW (ref 96–108)
CO2 SERPL-SCNC: 23 MMOL/L — SIGNIFICANT CHANGE UP (ref 22–31)
CREAT SERPL-MCNC: 1.86 MG/DL — HIGH (ref 0.5–1.3)
GLUCOSE BLDC GLUCOMTR-MCNC: 247 MG/DL — HIGH (ref 70–99)
GLUCOSE BLDC GLUCOMTR-MCNC: 253 MG/DL — HIGH (ref 70–99)
GLUCOSE BLDC GLUCOMTR-MCNC: 283 MG/DL — HIGH (ref 70–99)
GLUCOSE BLDC GLUCOMTR-MCNC: 288 MG/DL — HIGH (ref 70–99)
GLUCOSE SERPL-MCNC: 250 MG/DL — HIGH (ref 70–99)
HCT VFR BLD CALC: 26.4 % — LOW (ref 39–50)
HEMOSIDERIN UR-MCNC: SIGNIFICANT CHANGE UP
HGB BLD-MCNC: 9.1 G/DL — LOW (ref 13–17)
MCHC RBC-ENTMCNC: 32.9 PG — SIGNIFICANT CHANGE UP (ref 27–34)
MCHC RBC-ENTMCNC: 34.5 GM/DL — SIGNIFICANT CHANGE UP (ref 32–36)
MCV RBC AUTO: 95.3 FL — SIGNIFICANT CHANGE UP (ref 80–100)
PLATELET # BLD AUTO: 39 K/UL — LOW (ref 150–400)
POTASSIUM SERPL-MCNC: 3.8 MMOL/L — SIGNIFICANT CHANGE UP (ref 3.5–5.3)
POTASSIUM SERPL-SCNC: 3.8 MMOL/L — SIGNIFICANT CHANGE UP (ref 3.5–5.3)
RBC # BLD: 2.77 M/UL — LOW (ref 4.2–5.8)
RBC # FLD: 18.5 % — HIGH (ref 10.3–14.5)
SODIUM SERPL-SCNC: 132 MMOL/L — LOW (ref 135–145)
WBC # BLD: 4.8 K/UL — SIGNIFICANT CHANGE UP (ref 3.8–10.5)
WBC # FLD AUTO: 4.8 K/UL — SIGNIFICANT CHANGE UP (ref 3.8–10.5)

## 2019-12-10 PROCEDURE — 99232 SBSQ HOSP IP/OBS MODERATE 35: CPT

## 2019-12-10 PROCEDURE — 99223 1ST HOSP IP/OBS HIGH 75: CPT

## 2019-12-10 PROCEDURE — 99233 SBSQ HOSP IP/OBS HIGH 50: CPT

## 2019-12-10 RX ORDER — INSULIN LISPRO 100/ML
4 VIAL (ML) SUBCUTANEOUS
Refills: 0 | Status: DISCONTINUED | OUTPATIENT
Start: 2019-12-10 | End: 2019-12-11

## 2019-12-10 RX ORDER — ERTAPENEM SODIUM 1 G/1
INJECTION, POWDER, LYOPHILIZED, FOR SOLUTION INTRAMUSCULAR; INTRAVENOUS
Refills: 0 | Status: DISCONTINUED | OUTPATIENT
Start: 2019-12-10 | End: 2019-12-10

## 2019-12-10 RX ORDER — PANTOPRAZOLE SODIUM 20 MG/1
40 TABLET, DELAYED RELEASE ORAL ONCE
Refills: 0 | Status: COMPLETED | OUTPATIENT
Start: 2019-12-10 | End: 2019-12-10

## 2019-12-10 RX ORDER — ERTAPENEM SODIUM 1 G/1
INJECTION, POWDER, LYOPHILIZED, FOR SOLUTION INTRAMUSCULAR; INTRAVENOUS
Refills: 0 | Status: COMPLETED | OUTPATIENT
Start: 2019-12-10 | End: 2019-12-15

## 2019-12-10 RX ORDER — ALBUMIN HUMAN 25 %
100 VIAL (ML) INTRAVENOUS EVERY 8 HOURS
Refills: 0 | Status: COMPLETED | OUTPATIENT
Start: 2019-12-10 | End: 2019-12-11

## 2019-12-10 RX ORDER — ERTAPENEM SODIUM 1 G/1
1000 INJECTION, POWDER, LYOPHILIZED, FOR SOLUTION INTRAMUSCULAR; INTRAVENOUS EVERY 24 HOURS
Refills: 0 | Status: COMPLETED | OUTPATIENT
Start: 2019-12-11 | End: 2019-12-15

## 2019-12-10 RX ORDER — ERTAPENEM SODIUM 1 G/1
1000 INJECTION, POWDER, LYOPHILIZED, FOR SOLUTION INTRAMUSCULAR; INTRAVENOUS ONCE
Refills: 0 | Status: COMPLETED | OUTPATIENT
Start: 2019-12-10 | End: 2019-12-10

## 2019-12-10 RX ADMIN — LACTULOSE 30 GRAM(S): 10 SOLUTION ORAL at 22:20

## 2019-12-10 RX ADMIN — SODIUM CHLORIDE 2 GRAM(S): 9 INJECTION INTRAMUSCULAR; INTRAVENOUS; SUBCUTANEOUS at 22:20

## 2019-12-10 RX ADMIN — Medication 3: at 08:49

## 2019-12-10 RX ADMIN — MIDODRINE HYDROCHLORIDE 20 MILLIGRAM(S): 2.5 TABLET ORAL at 06:33

## 2019-12-10 RX ADMIN — Medication 4 UNIT(S): at 18:17

## 2019-12-10 RX ADMIN — Medication 1 GRAM(S): at 18:18

## 2019-12-10 RX ADMIN — LACTULOSE 30 GRAM(S): 10 SOLUTION ORAL at 06:33

## 2019-12-10 RX ADMIN — Medication 50 MILLILITER(S): at 15:22

## 2019-12-10 RX ADMIN — ERTAPENEM SODIUM 120 MILLIGRAM(S): 1 INJECTION, POWDER, LYOPHILIZED, FOR SOLUTION INTRAMUSCULAR; INTRAVENOUS at 18:18

## 2019-12-10 RX ADMIN — MIDODRINE HYDROCHLORIDE 20 MILLIGRAM(S): 2.5 TABLET ORAL at 12:42

## 2019-12-10 RX ADMIN — INSULIN GLARGINE 20 UNIT(S): 100 INJECTION, SOLUTION SUBCUTANEOUS at 22:20

## 2019-12-10 RX ADMIN — SODIUM CHLORIDE 2 GRAM(S): 9 INJECTION INTRAMUSCULAR; INTRAVENOUS; SUBCUTANEOUS at 12:43

## 2019-12-10 RX ADMIN — LACTULOSE 30 GRAM(S): 10 SOLUTION ORAL at 18:17

## 2019-12-10 RX ADMIN — MIDODRINE HYDROCHLORIDE 20 MILLIGRAM(S): 2.5 TABLET ORAL at 18:18

## 2019-12-10 RX ADMIN — PANTOPRAZOLE SODIUM 40 MILLIGRAM(S): 20 TABLET, DELAYED RELEASE ORAL at 04:23

## 2019-12-10 RX ADMIN — Medication 50 MILLILITER(S): at 22:21

## 2019-12-10 RX ADMIN — LACTULOSE 30 GRAM(S): 10 SOLUTION ORAL at 02:14

## 2019-12-10 RX ADMIN — SODIUM CHLORIDE 2 GRAM(S): 9 INJECTION INTRAMUSCULAR; INTRAVENOUS; SUBCUTANEOUS at 06:33

## 2019-12-10 RX ADMIN — ATORVASTATIN CALCIUM 10 MILLIGRAM(S): 80 TABLET, FILM COATED ORAL at 22:19

## 2019-12-10 RX ADMIN — Medication 3: at 18:17

## 2019-12-10 RX ADMIN — Medication 1 GRAM(S): at 06:33

## 2019-12-10 RX ADMIN — Medication 3: at 12:42

## 2019-12-10 RX ADMIN — TAMSULOSIN HYDROCHLORIDE 0.4 MILLIGRAM(S): 0.4 CAPSULE ORAL at 22:19

## 2019-12-10 NOTE — CONSULT NOTE ADULT - SUBJECTIVE AND OBJECTIVE BOX
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 Pt unable to provide history or answer questions. Unknown if patient has had a urologist before but is presumably taking flomax. Pt found to have a PSA level of 4.81 and consulted to be cleared of malignancy because he is on the liver transplant list.         PAST MEDICAL & SURGICAL HISTORY:  GIB (gastrointestinal bleeding)  GERD with esophagitis: Gastritis &amp; Non Bleeding Ulcers  Hepatic encephalopathy  Obesity  Fatty liver disease, nonalcoholic  Renal stones: 25 years ago  Hypertension  Neuropathy  Hypercholesteremia  Diabetes  S/P cholecystectomy      MEDICATIONS  (STANDING):  albumin human 25% IVPB 100 milliLiter(s) IV Intermittent every 8 hours  atorvastatin 10 milliGRAM(s) Oral at bedtime  dextrose 5%. 1000 milliLiter(s) (50 mL/Hr) IV Continuous <Continuous>  dextrose 50% Injectable 12.5 Gram(s) IV Push once  dextrose 50% Injectable 25 Gram(s) IV Push once  dextrose 50% Injectable 25 Gram(s) IV Push once  insulin glargine Injectable (LANTUS) 20 Unit(s) SubCutaneous at bedtime  insulin lispro (HumaLOG) corrective regimen sliding scale   SubCutaneous three times a day before meals  insulin lispro (HumaLOG) corrective regimen sliding scale   SubCutaneous at bedtime  insulin lispro Injectable (HumaLOG) 4 Unit(s) SubCutaneous three times a day before meals  lactulose Syrup 30 Gram(s) Oral every 4 hours  midodrine. 20 milliGRAM(s) Oral three times a day  pantoprazole    Tablet 40 milliGRAM(s) Oral before breakfast  rifAXIMin 550 milliGRAM(s) Oral two times a day  sodium chloride 2 Gram(s) Oral three times a day  sucralfate 1 Gram(s) Oral two times a day  tamsulosin 0.4 milliGRAM(s) Oral at bedtime    MEDICATIONS  (PRN):  dextrose 40% Gel 15 Gram(s) Oral once PRN Blood Glucose LESS THAN 70 milliGRAM(s)/deciliter  glucagon  Injectable 1 milliGRAM(s) IntraMuscular once PRN Glucose LESS THAN 70 milligrams/deciliter      FAMILY HISTORY:  Family history of type 2 diabetes mellitus  Family history of hypertension  Family history of stomach cancer      Allergies    codeine (Anaphylaxis)    Intolerances    NO  RED MEAT (Unknown)      SOCIAL HISTORY:    REVIEW OF SYSTEMS: Otherwise negative as stated in HPI    Physical Exam  Vital signs  T(C): 36.9 (12-10-19 @ 12:40), Max: 36.9 (12-09-19 @ 21:17)  HR: 88 (12-10-19 @ 12:40)  BP: 135/69 (12-10-19 @ 12:40)  SpO2: 97% (12-10-19 @ 12:40)  Wt(kg): --    I&O's Detail    09 Dec 2019 07:01  -  10 Dec 2019 07:00  --------------------------------------------------------  IN:    Oral Fluid: 460 mL    Solution: 50 mL  Total IN: 510 mL    OUT:    Voided: 650 mL  Total OUT: 650 mL    Total NET: -140 mL      10 Dec 2019 07:01  -  10 Dec 2019 15:24  --------------------------------------------------------  IN:    Oral Fluid: 240 mL  Total IN: 240 mL    OUT:  Total OUT: 0 mL    Total NET: 240 mL          Gen: Awake, NAD, A&Ox0    Pulm: No Respiratory Distress   	  CV: S1S2    GI: Soft NT/ND    : prostate mildly enlarged without nodules, induration or tenderness     Msk: marked weakness in bilateral upper and lower extremities                             	      LABS:      12-10 @ 09:09    WBC 4.80  / Hct 26.4  / SCr --       12-10 @ 07:14    WBC --    / Hct --    / SCr 1.86     12-10    132<L>  |  91<L>  |  43<H>  ----------------------------<  250<H>  3.8   |  23  |  1.86<H>    Ca    10.5      10 Dec 2019 07:14    TPro  6.4  /  Alb  3.8  /  TBili  7.2<H>  /  DBili  x   /  AST  25  /  ALT  17  /  AlkPhos  124<H>  12-09    PT/INR - ( 09 Dec 2019 08:21 )   PT: 24.4 sec;   INR: 2.09 ratio               Urine Cx:  Culture - Urine (12.06.19 @ 10:30)    -  Ampicillin: R >16 These ampicillin results predict results for amoxicillin    -  Ampicillin: R >16 These ampicillin results predict results for amoxicillin    -  Ampicillin/Sulbactam: R 16/8 Enterobacter, Citrobacter, and Serratia may develop resistance during prolonged therapy (3-4 days)    -  Ampicillin/Sulbactam: S <=8/4 Enterobacter, Citrobacter, and Serratia may develop resistance during prolonged therapy (3-4 days)    -  Cefazolin: R >16 (MIC_CL_COM_ENTERIC_CEFAZU) For uncomplicated UTI with K. pneumoniae, E. coli, or P. mirablis: LEONARDO <=16 is sensitive and LEONARDO >=32 is resistant. This also predicts results for oral agents cefaclor, cefdinir, cefpodoxime, cefprozil, cefuroxime axetil, cephalexin and locarbef for uncomplicated UTI. Note that some isolates may be susceptible to these agents while testing resistant to cefazolin.    -  Cefazolin: S <=8 (MIC_CL_COM_ENTERIC_CEFAZU) For uncomplicated UTI with K. pneumoniae, E. coli, or P. mirablis: LEONARDO <=16 is sensitive and LEONARDO >=32 is resistant. This also predicts results for oral agents cefaclor, cefdinir, cefpodoxime, cefprozil, cefuroxime axetil, cephalexin and locarbef for uncomplicated UTI. Note that some isolates may be susceptible to these agents while testing resistant to cefazolin.    -  Amikacin: S <=16    -  Amikacin: S <=16    -  Aztreonam: R >16    -  Aztreonam: S <=4    -  Imipenem: S <=1    -  Imipenem: S <=1    -  Levofloxacin: R >4    -  Levofloxacin: S <=2    -  Ertapenem: S <=1    -  Gentamicin: S <=4    -  Gentamicin: S <=4    -  Ciprofloxacin: R >2    -  Ciprofloxacin: S <=1    -  Ceftriaxone: R >32 Enterobacter, Citrobacter, and Serratia may develop resistance during prolonged therapy    -  Ceftriaxone: S <=1 Enterobacter, Citrobacter, and Serratia may develop resistance during prolonged therapy    -  Cefoxitin: S <=8    -  Cefoxitin: S <=8    -  Cefepime: R >16    -  Cefepime: S <=4    -  Piperacillin/Tazobactam: R <=16    -  Piperacillin/Tazobactam: S <=16    -  Tigecycline: S <=2    -  Tigecycline: S <=2    -  Meropenem: S <=1    -  Meropenem: S <=1    -  Nitrofurantoin: S <=32 Should not be used to treat pyelonephritis    -  Nitrofurantoin: I 64 Should not be used to treat pyelonephritis    -  Trimethoprim/Sulfamethoxazole: S <=2/38    -  Trimethoprim/Sulfamethoxazole: S <=2/38    -  Tobramycin: S <=4    -  Tobramycin: S <=4    Specimen Source: .Urine Clean Catch (Midstream)    Culture Results:   >100,000 CFU/ml Escherichia coli ESBL  >100,000 CFU/ml Klebsiella pneumoniae    Organism Identification: Gram Negative Rods  Gram Negative Rods    Organism: Gram Negative Rods    Organism: Gram Negative Rods    Method Type: LEONARDO    Method Type: LEONARDO          RADIOLOGY:  < from: CT Abdomen and Pelvis No Cont (11.26.19 @ 12:55) >  INTERPRETATION:  CLINICAL INFORMATION: Cirrhosis. Recent fall. Assess for   traumatic injury.    COMPARISON: CT chest 10/26/2019, CT abdomen pelvis 10/23/2019    PROCEDURE:   CT of the Chest, Abdomen and Pelvis was performed without intravenous   contrast.   Intravenous contrast: None.  Oral contrast: None.  Sagittal and coronal reformats were performed.    FINDINGS:    CHEST:     LUNGS AND LARGE AIRWAYS: Patent central airways. No pulmonary nodules.  PLEURA: No pleural effusion.  VESSELS: Atherosclerotic changes of the aorta and coronary arteries.  HEART: Heart size is normal. No pericardial effusion.  MEDIASTINUM AND RONNIE: No lymphadenopathy.  CHEST WALL AND LOWER NECK: A new 1.5 cm rounded high density focus within   the left chest wall (3:22) from prior imaging 10/26/2019 may represent a   subcutaneous hematoma.    ABDOMEN AND PELVIS:    LIVER: Cirrhosis. Limited evaluation for focal lesion given lack of   intravenous contrast.  BILE DUCTS: Normal caliber.  GALLBLADDER: Within normal limits.  SPLEEN: Within normal limits.  PANCREAS: Within normal limits.  ADRENALS: Within normal limits.  KIDNEYS/URETERS: A 4 mm nonobstructing left renal calculus.    BLADDER: Within normal limits.  REPRODUCTIVE ORGANS: Prostate within normal limits.    BOWEL: No bowel obstruction. Appendix is normal.  PERITONEUM: No ascites.  VESSELS: Atherosclerotic changes.Upper abdominal varices.  RETROPERITONEUM/LYMPH NODES: No lymphadenopathy.    ABDOMINAL WALL: Within normal limits.  BONES: Degenerative changes.    IMPRESSION:     A new 1.5 cm rounded high density focus within the left chest wall from   prior imaging 10/26/2019 may represent a subcutaneous hematoma.   Otherwise, no evidence of acute traumatic injury.    Cirrhosis. No ascites.    < end of copied text >

## 2019-12-10 NOTE — PROGRESS NOTE ADULT - SUBJECTIVE AND OBJECTIVE BOX
Patient is a 63y Male whom presented to the hospital with ckd and dena   pateint seen and examined nad , no fever , no chills      PAST MEDICAL & SURGICAL HISTORY:  GIB (gastrointestinal bleeding)  GERD with esophagitis: Gastritis &amp; Non Bleeding Ulcers  Hepatic encephalopathy  Obesity  Fatty liver disease, nonalcoholic  Renal stones: 25 years ago  Hypertension  Neuropathy  Hypercholesteremia  Diabetes  S/P cholecystectomy      MEDICATIONS  (STANDING):  dextrose 5%. 1000 milliLiter(s) (50 mL/Hr) IV Continuous <Continuous>  dextrose 50% Injectable 12.5 Gram(s) IV Push once  dextrose 50% Injectable 25 Gram(s) IV Push once  dextrose 50% Injectable 25 Gram(s) IV Push once  insulin lispro (HumaLOG) corrective regimen sliding scale   SubCutaneous three times a day before meals  insulin lispro (HumaLOG) corrective regimen sliding scale   SubCutaneous at bedtime      Allergies    codeine (Anaphylaxis)    Intolerances    NO  RED MEAT (Unknown)                                     SOCIAL HISTORY:  Denies ETOh,Smoking,     FAMILY HISTORY:  Family history of type 2 diabetes mellitus  Family history of hypertension  Family history of stomach cancer      REVIEW OF SYSTEMS:    CONSTITUTIONAL: No weakness, fevers or chills  RESPIRATORY: No cough, wheezing, hemoptysis; No shortness of breath  CARDIOVASCULAR: No chest pain or palpitations  GASTROINTESTINAL: No abdominal or epigastric pain. No nausea, vomiting,     No diarrhea or constipation. No melena   GENITOURINARY: No dysuria, frequency or hematuria  NEUROLOGICAL: No numbness or weakness  SKIN: dry                                                                   9.1    4.80  )-----------( 39       ( 10 Dec 2019 09:09 )             26.4       CBC Full  -  ( 10 Dec 2019 09:09 )  WBC Count : 4.80 K/uL  RBC Count : 2.77 M/uL  Hemoglobin : 9.1 g/dL  Hematocrit : 26.4 %  Platelet Count - Automated : 39 K/uL  Mean Cell Volume : 95.3 fl  Mean Cell Hemoglobin : 32.9 pg  Mean Cell Hemoglobin Concentration : 34.5 gm/dL  Auto Neutrophil # : x  Auto Lymphocyte # : x  Auto Monocyte # : x  Auto Eosinophil # : x  Auto Basophil # : x  Auto Neutrophil % : x  Auto Lymphocyte % : x  Auto Monocyte % : x  Auto Eosinophil % : x  Auto Basophil % : x      12-10    132<L>  |  91<L>  |  43<H>  ----------------------------<  250<H>  3.8   |  23  |  1.86<H>    Ca    10.5      10 Dec 2019 07:14    TPro  6.4  /  Alb  3.8  /  TBili  7.2<H>  /  DBili  x   /  AST  25  /  ALT  17  /  AlkPhos  124<H>  12-09      CAPILLARY BLOOD GLUCOSE      POCT Blood Glucose.: 253 mg/dL (10 Dec 2019 18:00)  POCT Blood Glucose.: 288 mg/dL (10 Dec 2019 12:26)  POCT Blood Glucose.: 283 mg/dL (10 Dec 2019 08:26)  POCT Blood Glucose.: 229 mg/dL (09 Dec 2019 21:54)      Vital Signs Last 24 Hrs  T(C): 36.9 (10 Dec 2019 12:40), Max: 36.9 (09 Dec 2019 21:17)  T(F): 98.5 (10 Dec 2019 12:40), Max: 98.5 (10 Dec 2019 12:40)  HR: 88 (10 Dec 2019 12:40) (69 - 88)  BP: 135/69 (10 Dec 2019 12:40) (114/71 - 135/69)  BP(mean): --  RR: 18 (10 Dec 2019 12:40) (17 - 18)  SpO2: 97% (10 Dec 2019 12:40) (96% - 97%)        PT/INR - ( 09 Dec 2019 08:21 )   PT: 24.4 sec;   INR: 2.09 ratio                 PTT - ( 07 Dec 2019 09:42 )  PTT:44.1 sec              PHYSICAL EXAM:    Constitutional: NAD  HEENT: conjunctive   clear   Neck:  No JVD  Respiratory: CTAB  Cardiovascular: S1 and S2  Gastrointestinal: BS+, soft, NT/ND  Extremities: No peripheral edema  Neurological:  no focal deficits  Psychiatric: Normal mood, normal affect  Skin: dry   Access: Not applicable

## 2019-12-10 NOTE — PROGRESS NOTE ADULT - ASSESSMENT
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    #Presumed JEAN cirrhosis, MELD-Na 12/9 31      - varices: none on EGD outside in 10/2019      - HE: present, on lactulose and Xifaxan       - ascites: none currently, but history of ascites with SBP      - HCC: none on US 11/27, AFP 23 in 10/19; MRI w/o lesions from 12/6      - Vascular assessment: poor US doppler exam for eval of vessels due to body habitus       - Liver Workup: HAV immune, HBV non-immune, HCV neg, HIV neg, iron studies consistent wiith AoCD, RPR neg, QG neg, CMV neg, EBV IgG pos only, HSV1 pos, ASMA neg, AMA neg, ceruloplasmin LOW 12      - Age appropriate screening: colonoscopy 5-6 months ago per patient      - Dental evaluation: >1 year ago, no loose teeth per patient      - Cardiac evaluation: LHC completed 12/5, mild-mod non-occlusive CAD with low filling pressures, no interventions needed       - Blood type A+, Ab neg      - Previous transplant workup: none      - Social: retired, previous , lives in [place] with wife and son, previously highly functional (before October), Insurance -Medicare HMO  # MISA, Cr stable, likely pre-renal vs HRS; urine sodium still low, likely HRS  # Chest wall hematoma  # T2DM  # Hypertension (currently normotensive)    Recs:  - give 3 more doses IV albumin today - was not given yesterday  - obtain urology consult for elevated PSA as patient will need evaluation for clearance for liver transplant  - obtain 24 hour urine copper to rule out [place] as ceruloplasmin was low  - PT evaluation and recommendations for discharge to rehab   - continue midodrine 20mg TID, continue to hold diuretics  - continue lactulose titrated to 3 BMs daily, Xifaxan BID  - EGD/colon report in the chart, need to be scanned in  - patient to be discussed at transplant evaluation committee as outpatient, followup with Dr. Medina in transplant clinic Impression:  62yo M with Hypertension, DMT2, BPH, obesity, HFpEF grade 1, obesity, 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    #Presumed JEAN cirrhosis, MELD-Na 12/9 31      - varices: none on EGD outside in 10/2019      - HE: present, on lactulose and Xifaxan       - ascites: none currently, but history of ascites with SBP      - HCC: none on US 11/27, AFP 23 in 10/19; MRI w/o lesions from 12/6      - Vascular assessment: poor US doppler exam for eval of vessels due to body habitus       - Liver Workup: HAV immune, HBV non-immune, HCV neg, HIV neg, iron studies consistent wiith AoCD, RPR neg, QG neg, CMV neg, EBV IgG pos only, HSV1 pos, ASMA neg, AMA neg, ceruloplasmin LOW 12      - Age appropriate screening: colonoscopy 5-6 months ago per patient      - Dental evaluation: >1 year ago, no loose teeth per patient      - Cardiac evaluation: LHC completed 12/5, mild-mod non-occlusive CAD with low filling pressures, no interventions needed       - Blood type A+, Ab neg      - Previous transplant workup: none      - Social: retired, previous , lives in [place] with wife and son, previously highly functional (before October), Insurance -Medicare HMO  # MISA, Cr stable, likely pre-renal vs HRS; urine sodium still low, likely HRS  # Chest wall hematoma  # T2DM  # Hypertension (currently normotensive)    Recs:  - give 3 more doses IV albumin today - was not given yesterday  - obtain urology consult for elevated PSA as patient will need evaluation for clearance for liver transplant  - would also obtain ID consult given ESBL and klebsiella in the urine, not covered by antibiotics that he was given  - PT evaluation and recommendations for discharge to rehab   - continue midodrine 20mg TID, continue to hold diuretics  - continue lactulose titrated to 3 BMs daily, Xifaxan BID  - EGD/colon report in the chart, need to be scanned in  - patient to be discussed at transplant evaluation committee as outpatient, followup with Dr. Medina in transplant clinic

## 2019-12-10 NOTE — CONSULT NOTE ADULT - CONSULT REQUESTED BY NAME
Dr Prado
Dr Prado
Dr. Medina
Dr. Prado
Dr. Prado
Medicine
Neeraj Prado
Primary team
Yomi Medina MD
Dr. Prado
dr milton

## 2019-12-10 NOTE — CONSULT NOTE ADULT - ASSESSMENT
63 yo confused male with JEAN awaiting liver transplant found to have an elevated PSA of 4.81 consulted to r/o malignancy. Pt with INR of 2.1 and PLT 40. 65 yo confused male with JEAN awaiting liver transplant found to have an elevated PSA of 4.81 consulted to r/o malignancy. Pt with INR of 2.1 and PLT 40.    - PSA mildly elevated, could be secondary to evident UTI  - Would not recommend prostate biopsy at this time as concern for clinically significant disease  - Concern for clinically significant disease is low 63 yo confused male with JEAN awaiting liver transplant found to have an elevated PSA of 4.81 consulted to r/o malignancy. Pt with INR of 2.1 and PLT 40.    - PSA mildly elevated, could be secondary to evident UTI  - Would not recommend prostate biopsy at this time as concern for clinically significant disease  - Concern for clinically significant disease is low  - Additionally, patient may have had outpatient workup for prostate cancer including biopsy within the past year. Would rec following up with his outpatient providers for records. 65 yo confused male with JEAN awaiting liver transplant found to have an elevated PSA of 4.81 consulted to r/o malignancy. Pt with INR of 2.1 and PLT 40.    - PSA mildly elevated, could be secondary to evident UTI  - Would not recommend prostate biopsy at this time as concern for clinically significant disease  - Pt's wife informed us that PSA has been elevated in the past and he had prostate biopsy last year which was negative.  - Would recommend repeating PSA after patient has been treated for current UTI.  - He can follow up with his established Urologist or with us at [facility].  - Would also suggest obtaining prior PSA and biopsy records from his Urologist or PCP.

## 2019-12-10 NOTE — PHARMACOTHERAPY INTERVENTION NOTE - COMMENTS
63 yo M with DM A1C 5.8 on lantus 40 units subcutaneously at bedtime at home. Maintained euglycemina with lantus 20 units subcutaneously at bedtime while inpatient, but has been persistently in the 200s over the weekend. Recommendation made to add humalog 4 units subcutaneously three times daily with meals.    Completed 5 day course of ceftriaxone. UCx show klebsiella and ecoli ESBL. Recommendation made to start ertapenem 1g IV Q24H.    Luis Cardenas, PharmD  [phone] 63 yo M with DM A1C 5.8 on lantus 40 units subcutaneously at bedtime at home. Maintained euglycemina with lantus 20 units subcutaneously at bedtime while inpatient, but has been persistently in the 200s over the weekend. Recommendation made to add humalog 4 units subcutaneously three times daily with meals.    Completed 5 day course of ceftriaxone. UCx show klebsiella and ecoli ESBL. Recommendation made to start ertapenem 1g IV Q24H.    Luis Cardenas, PharmD, BCPS  [phone]

## 2019-12-10 NOTE — CONSULT NOTE ADULT - PROVIDER SPECIALTY LIST ADULT
Nephrology
Palliative Care
Hepatology
Cardiology
Cardiology
Heme/Onc
Hepatology
Infectious Disease
Transplant Surgery
Urology
Gastroenterology

## 2019-12-10 NOTE — PROGRESS NOTE ADULT - SUBJECTIVE AND OBJECTIVE BOX
[facility]    Sushila Dhaliwal, Doug, Morenita, Ayo, Clay, Colleen      [phone]    CHIEF COMPLAINT: Patient is a 64y old  Male who presents with a chief complaint of difficulty to ambulate/ weakness (10 Dec 2019 09:39)      Follow Up: cirrhosis, diastolic hf    Interim history:    MEDICATIONS  (STANDING):  atorvastatin 10 milliGRAM(s) Oral at bedtime  dextrose 5%. 1000 milliLiter(s) (50 mL/Hr) IV Continuous <Continuous>  dextrose 50% Injectable 12.5 Gram(s) IV Push once  dextrose 50% Injectable 25 Gram(s) IV Push once  dextrose 50% Injectable 25 Gram(s) IV Push once  insulin glargine Injectable (LANTUS) 20 Unit(s) SubCutaneous at bedtime  insulin lispro (HumaLOG) corrective regimen sliding scale   SubCutaneous three times a day before meals  insulin lispro (HumaLOG) corrective regimen sliding scale   SubCutaneous at bedtime  lactulose Syrup 30 Gram(s) Oral every 4 hours  midodrine. 20 milliGRAM(s) Oral three times a day  pantoprazole    Tablet 40 milliGRAM(s) Oral before breakfast  rifAXIMin 550 milliGRAM(s) Oral two times a day  sodium chloride 2 Gram(s) Oral three times a day  sucralfate 1 Gram(s) Oral two times a day  tamsulosin 0.4 milliGRAM(s) Oral at bedtime    MEDICATIONS  (PRN):  dextrose 40% Gel 15 Gram(s) Oral once PRN Blood Glucose LESS THAN 70 milliGRAM(s)/deciliter  glucagon  Injectable 1 milliGRAM(s) IntraMuscular once PRN Glucose LESS THAN 70 milligrams/deciliter      REVIEW OF SYSTEMS:  eye, ent, GI, , allergic, dermatologic, musculoskeletal and neurologic are negative except as described above    Vital Signs Last 24 Hrs  T(C): 36.8 (10 Dec 2019 04:08), Max: 37 (09 Dec 2019 12:26)  T(F): 98.3 (10 Dec 2019 04:08), Max: 98.6 (09 Dec 2019 12:26)  HR: 88 (10 Dec 2019 04:08) (69 - 88)  BP: 114/71 (10 Dec 2019 04:08) (114/71 - 127/70)  BP(mean): --  RR: 18 (10 Dec 2019 04:08) (17 - 18)  SpO2: 97% (10 Dec 2019 04:08) (96% - 98%)    I&O's Summary    09 Dec 2019 07:01  -  10 Dec 2019 07:00  --------------------------------------------------------  IN: 510 mL / OUT: 650 mL / NET: -140 mL        Telemetry past 24h:    PHYSICAL EXAM:    Constitutional: well-nourished, well-developed, NAD   HEENT:  MMM, sclerae anicteric, conjunctivae clear, no oral cyanosis.  Pulmonary: Non-labored, breath sounds are clear bilaterally, No wheezing, rales or rhonchi  Cardiovascular: Regular, S1 and S2.  No murmur.  No rubs, gallops or clicks  Gastrointestinal: Bowel Sounds present, soft, nontender.   Lymph: No peripheral edema.   Neurological: Alert, no focal deficits  Skin: No rashes.  Psych:  Mood & affect appropriate    LABS: All Labs Reviewed:                        9.1    4.80  )-----------( 39       ( 10 Dec 2019 09:09 )             26.4                         8.4    5.96  )-----------( 40       ( 09 Dec 2019 07:43 )             24.3                         8.4    4.78  )-----------( 38       ( 08 Dec 2019 09:38 )             24.5     10 Dec 2019 07:14    132    |  91     |  43     ----------------------------<  250    3.8     |  23     |  1.86   09 Dec 2019 22:28    130    |  90     |  40     ----------------------------<  250    4.0     |  24     |  1.80   09 Dec 2019 07:01    129    |  91     |  38     ----------------------------<  235    3.7     |  22     |  1.81     Ca    10.5       10 Dec 2019 07:14  Ca    10.9       09 Dec 2019 22:28  Ca    10.8       09 Dec 2019 07:01    TPro  6.4    /  Alb  3.8    /  TBili  7.2    /  DBili  x      /  AST  25     /  ALT  17     /  AlkPhos  124    09 Dec 2019 07:01  TPro  6.0    /  Alb  3.9    /  TBili  6.8    /  DBili  x      /  AST  26     /  ALT  16     /  AlkPhos  123    08 Dec 2019 07:07    PT/INR - ( 09 Dec 2019 08:21 )   PT: 24.4 sec;   INR: 2.09 ratio               Blood Culture: Organism Gram Negative Rods  Gram Stain Blood -- Gram Stain --  Specimen Source .Urine Clean Catch (Midstream)  Culture-Blood --            RADIOLOGY:    EKG:    Echo:

## 2019-12-10 NOTE — PROGRESS NOTE ADULT - SUBJECTIVE AND OBJECTIVE BOX
Chief Complaint:  Patient is a 64y old  Male who presents with a chief complaint of difficulty to ambulate/ weakness (09 Dec 2019 18:19)      Interval Events: Iv albumin was not given yesterday.  Cr slightly up.     Allergies:  codeine (Anaphylaxis)  NO  RED MEAT (Unknown)      Hospital Medications:  atorvastatin 10 milliGRAM(s) Oral at bedtime  dextrose 40% Gel 15 Gram(s) Oral once PRN  dextrose 5%. 1000 milliLiter(s) IV Continuous <Continuous>  dextrose 50% Injectable 12.5 Gram(s) IV Push once  dextrose 50% Injectable 25 Gram(s) IV Push once  dextrose 50% Injectable 25 Gram(s) IV Push once  glucagon  Injectable 1 milliGRAM(s) IntraMuscular once PRN  insulin glargine Injectable (LANTUS) 20 Unit(s) SubCutaneous at bedtime  insulin lispro (HumaLOG) corrective regimen sliding scale   SubCutaneous three times a day before meals  insulin lispro (HumaLOG) corrective regimen sliding scale   SubCutaneous at bedtime  lactulose Syrup 30 Gram(s) Oral every 4 hours  midodrine. 20 milliGRAM(s) Oral three times a day  pantoprazole    Tablet 40 milliGRAM(s) Oral before breakfast  rifAXIMin 550 milliGRAM(s) Oral two times a day  sodium chloride 2 Gram(s) Oral three times a day  sucralfate 1 Gram(s) Oral two times a day  tamsulosin 0.4 milliGRAM(s) Oral at bedtime      PMHX/PSHX:  GIB (gastrointestinal bleeding)  GERD with esophagitis  Hepatic encephalopathy  Obesity  Fatty liver disease, nonalcoholic  Renal stones  Hypertension  Neuropathy  Hypercholesteremia  Diabetes  S/P cholecystectomy  No significant past surgical history      Family history:  Family history of type 2 diabetes mellitus  Family history of hypertension  Family history of stomach cancer  No pertinent family history in first degree relatives      ROS:     General:  No wt loss, fevers, chills, night sweats, fatigue,   Eyes:  Good vision, no reported pain  ENT:  No sore throat, pain, runny nose, dysphagia  CV:  No pain, palpitations, hypo/hypertension  Resp:  No dyspnea, cough, tachypnea, wheezing  GI:  See HPI  :  No pain, bleeding, incontinence, nocturia  Muscle:  No pain, weakness  Neuro:  No weakness, tingling, memory problems  Psych:  No fatigue, insomnia, mood problems, depression  Endocrine:  No polyuria, polydipsia, cold/heat intolerance  Heme:  No petechiae, ecchymosis, easy bruisability  Skin:  No rash, edema      PHYSICAL EXAM:     GENERAL: NAD  HEENT:  NC/AT  CHEST:  Full & symmetric excursion, no increased effort  ABDOMEN:  Soft, non-tender, non-distended, +BS  EXTREMITIES:  no edema  SKIN:  No rash  NEURO:  Alert      Vital Signs:  Vital Signs Last 24 Hrs  T(C): 36.8 (10 Dec 2019 04:08), Max: 37 (09 Dec 2019 12:26)  T(F): 98.3 (10 Dec 2019 04:08), Max: 98.6 (09 Dec 2019 12:26)  HR: 88 (10 Dec 2019 04:08) (69 - 88)  BP: 114/71 (10 Dec 2019 04:08) (114/71 - 127/70)  BP(mean): --  RR: 18 (10 Dec 2019 04:08) (17 - 18)  SpO2: 97% (10 Dec 2019 04:08) (96% - 98%)  Daily     Daily     LABS:                        8.4    5.96  )-----------( 40       ( 09 Dec 2019 07:43 )             24.3     12-10    132<L>  |  91<L>  |  43<H>  ----------------------------<  250<H>  3.8   |  23  |  1.86<H>    Ca    10.5      10 Dec 2019 07:14    TPro  6.4  /  Alb  3.8  /  TBili  7.2<H>  /  DBili  x   /  AST  25  /  ALT  17  /  AlkPhos  124<H>  12-09    LIVER FUNCTIONS - ( 09 Dec 2019 07:01 )  Alb: 3.8 g/dL / Pro: 6.4 g/dL / ALK PHOS: 124 U/L / ALT: 17 U/L / AST: 25 U/L / GGT: x           PT/INR - ( 09 Dec 2019 08:21 )   PT: 24.4 sec;   INR: 2.09 ratio             Amylase Serum--      Lipase serum--       [id number]      Imaging:    reviewed

## 2019-12-10 NOTE — CONSULT NOTE ADULT - CONSULT REASON
ckd and dena
Cardiac evaluation prior to possible liver transplant
Cirrhosis
Jaundice
OLTx evaluation
abnormal ekg
cirrhosis
elevated PSA in setting of liver transplant
elevated bili, eval for hemolysis
h/o bacteremia r/o infection
GOC, JEAN cirrhosis

## 2019-12-10 NOTE — PROGRESS NOTE ADULT - ATTENDING COMMENTS
Hepatology Staff: Yomi Medina MD    I saw and examined the patient along with  Dr. Ni 12-10-19 @ 12:02.    Patient Medical Record, hospital course was reviewed and summarized as below:      Hemodynamic Status: stable  Vitals: Vital Signs Last 24 Hrs  T(C): 36.8 (10 Dec 2019 04:08), Max: 37 (09 Dec 2019 12:26)  T(F): 98.3 (10 Dec 2019 04:08), Max: 98.6 (09 Dec 2019 12:26)  HR: 88 (10 Dec 2019 04:08) (69 - 88)  BP: 114/71 (10 Dec 2019 04:08) (114/71 - 127/70)  RR: 18 (10 Dec 2019 04:08) (17 - 18)  SpO2: 97% (10 Dec 2019 04:08) (96% - 98%)      Diuretics: held  Labs:Creatinine, Serum: 1.86 mg/dL (12-10-19 @ 07:14)  Creatinine, Serum: 1.80 mg/dL (12-09-19 @ 22:28)    Imaging Studies:  I/O: I&O's Summary    09 Dec 2019 07:01  -  10 Dec 2019 07:00  --------------------------------------------------------  IN: 510 mL / OUT: 650 mL / NET: -140 mL    Last 24 hour events: none    Recommendations: Cr mildly elevated. Appears somewhat dry. would recommend IV Albumin 25% 25 g q 6 hrs. UA- ESBL +. Would recommend ID consult, and change antibiotics ( possibly to Eratapenem). Urology consult for elevated PSA. Needs PT. Transplant eval in progress.    Plan discussed with Primary team.

## 2019-12-10 NOTE — CONSULT NOTE ADULT - CONSULT REQUESTED DATE/TIME
26-Nov-2019 17:20
02-Dec-2019 18:26
04-Dec-2019 09:00
05-Dec-2019 16:01
10-Dec-2019 15:20
27-Nov-2019 08:28
27-Nov-2019 11:56
27-Nov-2019 12:49
28-Nov-2019 08:14
28-Nov-2019 14:33
02-Dec-2019

## 2019-12-10 NOTE — PROGRESS NOTE ADULT - SUBJECTIVE AND OBJECTIVE BOX
SUBJECTIVE / OVERNIGHT EVENTS: pt denies headache, nausea,v   chest pain, shortness of breath ,     MEDICATIONS  (STANDING):  albumin human 25% IVPB 100 milliLiter(s) IV Intermittent every 8 hours  atorvastatin 10 milliGRAM(s) Oral at bedtime  dextrose 5%. 1000 milliLiter(s) (50 mL/Hr) IV Continuous <Continuous>  dextrose 50% Injectable 12.5 Gram(s) IV Push once  dextrose 50% Injectable 25 Gram(s) IV Push once  dextrose 50% Injectable 25 Gram(s) IV Push once  ertapenem  IVPB      ertapenem  IVPB 1000 milliGRAM(s) IV Intermittent every 24 hours  insulin glargine Injectable (LANTUS) 20 Unit(s) SubCutaneous at bedtime  insulin lispro (HumaLOG) corrective regimen sliding scale   SubCutaneous three times a day before meals  insulin lispro (HumaLOG) corrective regimen sliding scale   SubCutaneous at bedtime  insulin lispro Injectable (HumaLOG) 4 Unit(s) SubCutaneous three times a day before meals  lactulose Syrup 30 Gram(s) Oral every 4 hours  midodrine. 20 milliGRAM(s) Oral three times a day  pantoprazole    Tablet 40 milliGRAM(s) Oral before breakfast  rifAXIMin 550 milliGRAM(s) Oral two times a day  sodium chloride 2 Gram(s) Oral three times a day  sucralfate 1 Gram(s) Oral two times a day  tamsulosin 0.4 milliGRAM(s) Oral at bedtime    MEDICATIONS  (PRN):  dextrose 40% Gel 15 Gram(s) Oral once PRN Blood Glucose LESS THAN 70 milliGRAM(s)/deciliter  glucagon  Injectable 1 milliGRAM(s) IntraMuscular once PRN Glucose LESS THAN 70 milligrams/deciliter    Vital Signs Last 24 Hrs  T(C): 36.7 (10 Dec 2019 22:25), Max: 37.1 (10 Dec 2019 21:02)  T(F): 98.1 (10 Dec 2019 22:25), Max: 98.8 (10 Dec 2019 21:02)  HR: 90 (10 Dec 2019 22:25) (88 - 101)  BP: 126/65 (10 Dec 2019 22:25) (106/66 - 135/69)  BP(mean): --  RR: 18 (10 Dec 2019 22:25) (17 - 18)  SpO2: 96% (10 Dec 2019 22:25) (96% - 97%)    Constitutional: No fever, fatigue  Skin: No rash.  Eyes: No recent vision problems or eye pain.  ENT: No congestion, ear pain, or sore throat.  Cardiovascular: No chest pain or palpation.  Respiratory: No cough, shortness of breath, congestion, or wheezing.  Gastrointestinal: No abdominal pain, nausea, vomiting, or diarrhea.  Genitourinary: No dysuria.  Musculoskeletal: No joint swelling.  Neurologic: No headache.    PHYSICAL EXAM:  GENERAL: NAD  EYES: EOMI, PERRLA  NECK: Supple, No JVD  CHEST/LUNG: dec breath sounds at bases   HEART:  S1 , S2 +  ABDOMEN: soft , bs+, mild distension + , no tenderness  EXTREMITIES:  no edema  NEUROLOGY:alert awake oriented   flap tremor+    LABS:  12-10    132<L>  |  91<L>  |  43<H>  ----------------------------<  250<H>  3.8   |  23  |  1.86<H>    Ca    10.5      10 Dec 2019 07:14    TPro  6.4  /  Alb  3.8  /  TBili  7.2<H>  /  DBili      /  AST  25  /  ALT  17  /  AlkPhos  124<H>  12-09    Creatinine Trend: 1.86 <--, 1.80 <--, 1.81 <--, 1.75 <--, 1.76 <--, 1.80 <--, 1.88 <--, 1.64 <--, 1.70 <--, 1.74 <--, 1.99 <--                        9.1    4.80  )-----------( 39       ( 10 Dec 2019 09:09 )             26.4     Urine Studies:  Urinalysis Basic - ( 04 Dec 2019 16:50 )    Color: Yellow / Appearance: Slightly Turbid / S.018 / pH:   Gluc:  / Ketone: Negative  / Bili: Negative / Urobili: Negative   Blood:  / Protein: 100 / Nitrite: Negative   Leuk Esterase: Large / RBC: 95 /hpf / WBC 93 /HPF   Sq Epi:  / Non Sq Epi: 0 /hpf / Bacteria: Many      Sodium, Random Urine: <35 mmol/L ( @ 16:31)  Creatinine, Random Urine: 118 mg/dL ( @ 03:54)  Protein/Creatinine Ratio Calculation: 0.9 Ratio ( @ 03:54)          LIVER FUNCTIONS - ( 09 Dec 2019 07:01 )  Alb: 3.8 g/dL / Pro: 6.4 g/dL / ALK PHOS: 124 U/L / ALT: 17 U/L / AST: 25 U/L / GGT: x           PT/INR - ( 09 Dec 2019 08:21 )   PT: 24.4 sec;   INR: 2.09 ratio           Color: Yellow / Appearance: Slightly Turbid / S.018 / pH:   Gluc:  / Ketone: Negative  / Bili: Negative / Urobili: Negative   Blood:  / Protein: 100 / Nitrite: Negative   Leuk Esterase: Large / RBC: 95 /hpf / WBC 93 /HPF   Sq Epi:  / Non Sq Epi: 0 /hpf / Bacteria: Many      Sodium, Random Urine: <35 mmol/L ( @ 16:31)  Creatinine, Random Urine: 118 mg/dL ( @ 03:54)  Protein/Creatinine Ratio Calculation: 0.9 Ratio ( @ 03:54)          LIVER FUNCTIONS - ( 09 Dec 2019 07:01 )  Alb: 3.8 g/dL / Pro: 6.4 g/dL / ALK PHOS: 124 U/L / ALT: 17 U/L / AST: 25 U/L / GGT: x           PT/INR - ( 09 Dec 2019 08:21 )   PT: 24.4 sec;   INR: 2.09 ratio

## 2019-12-10 NOTE — PROGRESS NOTE ADULT - SUBJECTIVE AND OBJECTIVE BOX
Patient is a 64y old  Male who presents with a chief complaint of difficulty to ambulate/ weakness (10 Dec 2019 13:53)    Being followed by ID for UTI-called back to see patient as patients urine cx from 12/6 now with Kleb+ e coli and is ESBl      Interval history:Patient when seen is confused  does not give a reliable ROS  whenhj asked for urinary symptoms -"why should I tell"  No other acute events      ROS:  Refused to answer any ROS questions reliably  says I am fine       Antimicrobials:    rifAXIMin 550 milliGRAM(s) Oral two times a day      other medications reviewed    Vital Signs Last 24 Hrs  T(C): 36.9 (12-10-19 @ 12:40), Max: 36.9 (12-09-19 @ 21:17)  T(F): 98.5 (12-10-19 @ 12:40), Max: 98.5 (12-10-19 @ 12:40)  HR: 88 (12-10-19 @ 12:40) (69 - 88)  BP: 135/69 (12-10-19 @ 12:40) (114/71 - 135/69)  BP(mean): --  RR: 18 (12-10-19 @ 12:40) (17 - 18)  SpO2: 97% (12-10-19 @ 12:40) (96% - 97%)    Physical Exam:    HEENT PERRLA    No oral exudate or erythema    Chest Good AE,CTA    CVS RRR S1 S2 WNl No murmur or rub or gallop    Abd soft BS normal No tenderness no masses    IV site no erythema tenderness or discharge  + FF  no sxcrotal or epididmal tenderness  No bob    LE stasis       CNS confused   Lab Data:                          9.1    4.80  )-----------( 39       ( 10 Dec 2019 09:09 )             26.4       12-10    132<L>  |  91<L>  |  43<H>  ----------------------------<  250<H>  3.8   |  23  |  1.86<H>      Creatinine, Serum: 1.86 mg/dL (12-10-19 @ 07:14)  Creatinine, Serum: 1.80 mg/dL (12-09-19 @ 22:28)  Creatinine, Serum: 1.81 mg/dL (12-09-19 @ 07:01)  Creatinine, Serum: 1.75 mg/dL (12-08-19 @ 18:36)  Creatinine, Serum: 1.76 mg/dL (12-08-19 @ 07:07)    Ca    10.5      10 Dec 2019 07:14    TPro  6.4  /  Alb  3.8  /  TBili  7.2<H>  /  DBili  x   /  AST  25  /  ALT  17  /  AlkPhos  124<H>  12-09    Urinalysis + Microscopic Examination (12.04.19 @ 16:50)    Leukocyte Esterase Concentration: Large    pH Urine: 6.0    Urine Appearance: Slightly Turbid    Protein, Urine: 100    Specific Gravity: 1.018    Urobilinogen: Negative    Color: Yellow    Blood, Urine: Large    Glucose Qualitative, Urine: Negative    Nitrite: Negative    Ketone - Urine: Negative    Bilirubin: Negative    Red Blood Cell - Urine: 95 /hpf    White Blood Cell - Urine: 93 /HPF    Epithelial Cells: 0 /hpf    Hyaline Casts: 3 /lpf    Bacteria: Many        Culture - Urine (collected 06 Dec 2019 10:30)  Source: .Urine Clean Catch (Midstream)  Final Report (09 Dec 2019 23:20):    >100,000 CFU/ml Escherichia coli ESBL    >100,000 CFU/ml Klebsiella pneumoniae  Organism: Gram Negative Rods  Gram Negative Rods (09 Dec 2019 23:22)  Organism: Gram Negative Rods (09 Dec 2019 23:22)      -  Amikacin: S <=16      -  Ampicillin: R >16 These ampicillin results predict results for amoxicillin      -  Ampicillin/Sulbactam: S <=8/4 Enterobacter, Citrobacter, and Serratia may develop resistance during prolonged therapy (3-4 days)      -  Aztreonam: S <=4      -  Cefazolin: S <=8 (MIC_CL_COM_ENTERIC_CEFAZU) For uncomplicated UTI with K. pneumoniae, E. coli, or P. mirablis: LEONARDO <=16 is sensitive and LEONARDO >=32 is resistant. This also predicts results for oral agents cefaclor, cefdinir, cefpodoxime, cefprozil, cefuroxime axetil, cephalexin and locarbef for uncomplicated UTI. Note that some isolates may be susceptible to these agents while testing resistant to cefazolin.      -  Cefepime: S <=4      -  Cefoxitin: S <=8      -  Ceftriaxone: S <=1 Enterobacter, Citrobacter, and Serratia may develop resistance during prolonged therapy      -  Ciprofloxacin: S <=1      -  Gentamicin: S <=4      -  Imipenem: S <=1      -  Levofloxacin: S <=2      -  Meropenem: S <=1      -  Nitrofurantoin: I 64 Should not be used to treat pyelonephritis      -  Piperacillin/Tazobactam: S <=16      -  Tigecycline: S <=2      -  Tobramycin: S <=4      -  Trimethoprim/Sulfamethoxazole: S <=2/38      Method Type: LEONARDO  Organism: Gram Negative Rods (09 Dec 2019 23:20)      -  Amikacin: S <=16      -  Ampicillin: R >16 These ampicillin results predict results for amoxicillin      -  Ampicillin/Sulbactam: R 16/8 Enterobacter, Citrobacter, and Serratia may develop resistance during prolonged therapy (3-4 days)      -  Aztreonam: R >16      -  Cefazolin: R >16 (MIC_CL_COM_ENTERIC_CEFAZU) For uncomplicated UTI with K. pneumoniae, E. coli, or P. mirablis: LEONARDO <=16 is sensitive and LEONARDO >=32 is resistant. This also predicts results for oral agents cefaclor, cefdinir, cefpodoxime, cefprozil, cefuroxime axetil, cephalexin and locarbef for uncomplicated UTI. Note that some isolates may be susceptible to these agents while testing resistant to cefazolin.      -  Cefepime: R >16      -  Cefoxitin: S <=8      -  Ceftriaxone: R >32 Enterobacter, Citrobacter, and Serratia may develop resistance during prolonged therapy      -  Ciprofloxacin: R >2      -  Ertapenem: S <=1      -  Gentamicin: S <=4      -  Imipenem: S <=1      -  Levofloxacin: R >4      -  Meropenem: S <=1      -  Nitrofurantoin: S <=32 Should not be used to treat pyelonephritis      -  Piperacillin/Tazobactam: R <=16      -  Tigecycline: S <=2      -  Tobramycin: S <=4      -  Trimethoprim/Sulfamethoxazole: S <=2/38      Method Type: LEONARDO                < from: MR Abdomen w/wo IV Cont (12.06.19 @ 13:46) >    IMPRESSION:     Cirrhosis with evidence of portal hypertension.    No focal hepatic lesion.    Standard hepatic arterial anatomy with widely patent celiac axis and SMA.   SMV and portal veins are patent as above.            < end of copied text >

## 2019-12-10 NOTE — PROGRESS NOTE ADULT - SUBJECTIVE AND OBJECTIVE BOX
INTERVAL HPI/OVERNIGHT EVENTS:    pt seen and examined  more lethargic and confused today  2 soft bms earlier today     MEDICATIONS  (STANDING):  albumin human 25% IVPB 100 milliLiter(s) IV Intermittent every 8 hours  atorvastatin 10 milliGRAM(s) Oral at bedtime  dextrose 5%. 1000 milliLiter(s) (50 mL/Hr) IV Continuous <Continuous>  dextrose 50% Injectable 12.5 Gram(s) IV Push once  dextrose 50% Injectable 25 Gram(s) IV Push once  dextrose 50% Injectable 25 Gram(s) IV Push once  insulin glargine Injectable (LANTUS) 20 Unit(s) SubCutaneous at bedtime  insulin lispro (HumaLOG) corrective regimen sliding scale   SubCutaneous three times a day before meals  insulin lispro (HumaLOG) corrective regimen sliding scale   SubCutaneous at bedtime  insulin lispro Injectable (HumaLOG) 4 Unit(s) SubCutaneous three times a day before meals  lactulose Syrup 30 Gram(s) Oral every 4 hours  midodrine. 20 milliGRAM(s) Oral three times a day  pantoprazole    Tablet 40 milliGRAM(s) Oral before breakfast  rifAXIMin 550 milliGRAM(s) Oral two times a day  sodium chloride 2 Gram(s) Oral three times a day  sucralfate 1 Gram(s) Oral two times a day  tamsulosin 0.4 milliGRAM(s) Oral at bedtime    MEDICATIONS  (PRN):  dextrose 40% Gel 15 Gram(s) Oral once PRN Blood Glucose LESS THAN 70 milliGRAM(s)/deciliter  glucagon  Injectable 1 milliGRAM(s) IntraMuscular once PRN Glucose LESS THAN 70 milligrams/deciliter      Allergies    codeine (Anaphylaxis)    Intolerances    NO  RED MEAT (Unknown)      Review of Systems:    General: NAD  Eyes:  Good vision, no reported pain  ENT:  No sore throat, pain, runny nose, dysphagia  CV:  No pain, palpitations, hypo/hypertension  Resp:  No dyspnea, cough, tachypnea, wheezing  GI:  No pain, No nausea, No vomiting, No diarrhea, No constipation, No weight loss, No fever, No pruritis, No rectal bleeding, No melena, No dysphagia  :  No pain, bleeding, incontinence, nocturia  Muscle:  No pain, weakness  Neuro:  No weakness, tingling, memory problems  Psych:  No fatigue, insomnia, mood problems, depression  Endocrine:  No polyuria, polydypsia, cold/heat intolerance  Heme:  No petechiae, ecchymosis, easy bruisability  Skin:  No rash, tattoos, scars, edema      Vital Signs Last 24 Hrs  T(C): 36.9 (10 Dec 2019 12:40), Max: 36.9 (09 Dec 2019 21:17)  T(F): 98.5 (10 Dec 2019 12:40), Max: 98.5 (10 Dec 2019 12:40)  HR: 88 (10 Dec 2019 12:40) (69 - 88)  BP: 135/69 (10 Dec 2019 12:40) (114/71 - 135/69)  BP(mean): --  RR: 18 (10 Dec 2019 12:40) (17 - 18)  SpO2: 97% (10 Dec 2019 12:40) (96% - 97%)    PHYSICAL EXAM:    Constitutional: NAD, lethargic   HEENT: EOMI, throat clear  Neck: No LAD, supple  Respiratory: CTA and P  Cardiovascular: S1 and S2, RRR, no M  Gastrointestinal: BS+, soft, NT/ND, neg HSM,  Extremities: No peripheral edema, neg clubbing, cyanosis  Vascular: 2+ peripheral pulses  Neurological: A/O x 1, no focal deficits  Psychiatric: Normal mood, normal affect  Skin: No rashes      LABS:                        9.1    4.80  )-----------( 39       ( 10 Dec 2019 09:09 )             26.4     12-10    132<L>  |  91<L>  |  43<H>  ----------------------------<  250<H>  3.8   |  23  |  1.86<H>    Ca    10.5      10 Dec 2019 07:14    TPro  6.4  /  Alb  3.8  /  TBili  7.2<H>  /  DBili  x   /  AST  25  /  ALT  17  /  AlkPhos  124<H>  12-09    PT/INR - ( 09 Dec 2019 08:21 )   PT: 24.4 sec;   INR: 2.09 ratio               RADIOLOGY & ADDITIONAL TESTS:

## 2019-12-10 NOTE — CONSULT NOTE ADULT - REASON FOR ADMISSION
difficulty to ambulate/ weakness
difficulty to ambulate/ weakness
confusion
difficulty to ambulate/ weakness

## 2019-12-10 NOTE — PROGRESS NOTE ADULT - ASSESSMENT
63M w/ pmh HTN, HLD, DM, non-alcoholic cirrhosis, chronic thrombocytopenia -- sent from rehab for worsening jaundice, dizzy/weakness/falls x 4 days.  h/o strep bacteremia 10/19 s/p RX  Was being observed off abx  12/6 urine Cx(unclear whys ent) with E coli and kleb-ESBL  Patient does not provide reliable ROS  No bob  No other systemic symptoms though ? confused/encephalopathic  ? colonization/contamination vs UTI  Rec:  1) repeat uA urine Cx  2) monitor clinically  3) Pending above empiric Invanz 1 gm IV q 24  Adjust dosage per renal function   Will tailor plan for ID issues  per course,results.Will defer to primary team on management of other issues.  case d/w Med NP    Will Follow.  Beeper [id number]/afterhours/No response-[id number]

## 2019-12-11 ENCOUNTER — TRANSCRIPTION ENCOUNTER (OUTPATIENT)
Age: 64
End: 2019-12-11

## 2019-12-11 LAB
ALBUMIN SERPL ELPH-MCNC: 4.1 G/DL — SIGNIFICANT CHANGE UP (ref 3.3–5)
ALP SERPL-CCNC: 107 U/L — SIGNIFICANT CHANGE UP (ref 40–120)
ALT FLD-CCNC: 19 U/L — SIGNIFICANT CHANGE UP (ref 10–45)
ANION GAP SERPL CALC-SCNC: 13 MMOL/L — SIGNIFICANT CHANGE UP (ref 5–17)
ANION GAP SERPL CALC-SCNC: 16 MMOL/L — SIGNIFICANT CHANGE UP (ref 5–17)
APPEARANCE UR: ABNORMAL
AST SERPL-CCNC: 30 U/L — SIGNIFICANT CHANGE UP (ref 10–40)
BACTERIA # UR AUTO: ABNORMAL
BILIRUB SERPL-MCNC: 8.5 MG/DL — HIGH (ref 0.2–1.2)
BILIRUB UR-MCNC: ABNORMAL
BLD GP AB SCN SERPL QL: NEGATIVE — SIGNIFICANT CHANGE UP
BUN SERPL-MCNC: 35 MG/DL — HIGH (ref 7–23)
BUN SERPL-MCNC: 40 MG/DL — HIGH (ref 7–23)
CALCIUM SERPL-MCNC: 10.8 MG/DL — HIGH (ref 8.4–10.5)
CALCIUM SERPL-MCNC: 11.1 MG/DL — HIGH (ref 8.4–10.5)
CHLORIDE SERPL-SCNC: 91 MMOL/L — LOW (ref 96–108)
CHLORIDE SERPL-SCNC: 95 MMOL/L — LOW (ref 96–108)
CO2 SERPL-SCNC: 24 MMOL/L — SIGNIFICANT CHANGE UP (ref 22–31)
CO2 SERPL-SCNC: 25 MMOL/L — SIGNIFICANT CHANGE UP (ref 22–31)
COLOR SPEC: ABNORMAL
CREAT SERPL-MCNC: 1.39 MG/DL — HIGH (ref 0.5–1.3)
CREAT SERPL-MCNC: 1.54 MG/DL — HIGH (ref 0.5–1.3)
DIFF PNL FLD: ABNORMAL
EPI CELLS # UR: 0 /HPF — SIGNIFICANT CHANGE UP (ref 0–5)
GLUCOSE BLDC GLUCOMTR-MCNC: 264 MG/DL — HIGH (ref 70–99)
GLUCOSE BLDC GLUCOMTR-MCNC: 272 MG/DL — HIGH (ref 70–99)
GLUCOSE BLDC GLUCOMTR-MCNC: 292 MG/DL — HIGH (ref 70–99)
GLUCOSE BLDC GLUCOMTR-MCNC: 304 MG/DL — HIGH (ref 70–99)
GLUCOSE BLDC GLUCOMTR-MCNC: 323 MG/DL — HIGH (ref 70–99)
GLUCOSE SERPL-MCNC: 270 MG/DL — HIGH (ref 70–99)
GLUCOSE SERPL-MCNC: 286 MG/DL — HIGH (ref 70–99)
GLUCOSE UR QL: NEGATIVE — SIGNIFICANT CHANGE UP
HCT VFR BLD CALC: 22.1 % — LOW (ref 39–50)
HGB BLD-MCNC: 7.7 G/DL — LOW (ref 13–17)
HYALINE CASTS # UR AUTO: 0 /LPF — SIGNIFICANT CHANGE UP (ref 0–7)
INR BLD: 2.21 RATIO — HIGH (ref 0.88–1.16)
KETONES UR-MCNC: SIGNIFICANT CHANGE UP
LEUKOCYTE ESTERASE UR-ACNC: ABNORMAL
MCHC RBC-ENTMCNC: 33.2 PG — SIGNIFICANT CHANGE UP (ref 27–34)
MCHC RBC-ENTMCNC: 34.8 GM/DL — SIGNIFICANT CHANGE UP (ref 32–36)
MCV RBC AUTO: 95.3 FL — SIGNIFICANT CHANGE UP (ref 80–100)
NITRITE UR-MCNC: NEGATIVE — SIGNIFICANT CHANGE UP
PH UR: 6 — SIGNIFICANT CHANGE UP (ref 5–8)
PLATELET # BLD AUTO: 40 K/UL — LOW (ref 150–400)
POTASSIUM SERPL-MCNC: 3.1 MMOL/L — LOW (ref 3.5–5.3)
POTASSIUM SERPL-MCNC: 3.4 MMOL/L — LOW (ref 3.5–5.3)
POTASSIUM SERPL-SCNC: 3.1 MMOL/L — LOW (ref 3.5–5.3)
POTASSIUM SERPL-SCNC: 3.4 MMOL/L — LOW (ref 3.5–5.3)
PROT SERPL-MCNC: 6.3 G/DL — SIGNIFICANT CHANGE UP (ref 6–8.3)
PROT UR-MCNC: ABNORMAL
PROTHROM AB SERPL-ACNC: 26.1 SEC — HIGH (ref 10–13.1)
RBC # BLD: 2.32 M/UL — LOW (ref 4.2–5.8)
RBC # FLD: 18.4 % — HIGH (ref 10.3–14.5)
RBC CASTS # UR COMP ASSIST: >720 /HPF — HIGH (ref 0–4)
RH IG SCN BLD-IMP: POSITIVE — SIGNIFICANT CHANGE UP
SODIUM SERPL-SCNC: 131 MMOL/L — LOW (ref 135–145)
SODIUM SERPL-SCNC: 133 MMOL/L — LOW (ref 135–145)
SP GR SPEC: 1.02 — SIGNIFICANT CHANGE UP (ref 1.01–1.02)
UROBILINOGEN FLD QL: SIGNIFICANT CHANGE UP
WBC # BLD: 3.93 K/UL — SIGNIFICANT CHANGE UP (ref 3.8–10.5)
WBC # FLD AUTO: 3.93 K/UL — SIGNIFICANT CHANGE UP (ref 3.8–10.5)
WBC UR QL: >720 /HPF — HIGH (ref 0–5)

## 2019-12-11 PROCEDURE — 99232 SBSQ HOSP IP/OBS MODERATE 35: CPT

## 2019-12-11 RX ORDER — INSULIN LISPRO 100/ML
6 VIAL (ML) SUBCUTANEOUS ONCE
Refills: 0 | Status: COMPLETED | OUTPATIENT
Start: 2019-12-11 | End: 2019-12-11

## 2019-12-11 RX ORDER — POTASSIUM CHLORIDE 20 MEQ
40 PACKET (EA) ORAL ONCE
Refills: 0 | Status: COMPLETED | OUTPATIENT
Start: 2019-12-11 | End: 2019-12-11

## 2019-12-11 RX ORDER — INSULIN LISPRO 100/ML
6 VIAL (ML) SUBCUTANEOUS
Refills: 0 | Status: DISCONTINUED | OUTPATIENT
Start: 2019-12-11 | End: 2019-12-12

## 2019-12-11 RX ORDER — SODIUM CHLORIDE 9 MG/ML
2 INJECTION INTRAMUSCULAR; INTRAVENOUS; SUBCUTANEOUS THREE TIMES A DAY
Refills: 0 | Status: COMPLETED | OUTPATIENT
Start: 2019-12-11 | End: 2019-12-12

## 2019-12-11 RX ORDER — FUROSEMIDE 40 MG
20 TABLET ORAL ONCE
Refills: 0 | Status: COMPLETED | OUTPATIENT
Start: 2019-12-11 | End: 2019-12-11

## 2019-12-11 RX ORDER — HUMAN INSULIN 100 [IU]/ML
24 INJECTION, SUSPENSION SUBCUTANEOUS AT BEDTIME
Refills: 0 | Status: DISCONTINUED | OUTPATIENT
Start: 2019-12-11 | End: 2019-12-12

## 2019-12-11 RX ADMIN — SODIUM CHLORIDE 2 GRAM(S): 9 INJECTION INTRAMUSCULAR; INTRAVENOUS; SUBCUTANEOUS at 22:45

## 2019-12-11 RX ADMIN — SODIUM CHLORIDE 2 GRAM(S): 9 INJECTION INTRAMUSCULAR; INTRAVENOUS; SUBCUTANEOUS at 12:49

## 2019-12-11 RX ADMIN — Medication 1 GRAM(S): at 05:55

## 2019-12-11 RX ADMIN — Medication 1: at 22:44

## 2019-12-11 RX ADMIN — LACTULOSE 30 GRAM(S): 10 SOLUTION ORAL at 05:55

## 2019-12-11 RX ADMIN — Medication 4 UNIT(S): at 09:26

## 2019-12-11 RX ADMIN — Medication 6 UNIT(S): at 18:06

## 2019-12-11 RX ADMIN — Medication 40 MILLIEQUIVALENT(S): at 18:10

## 2019-12-11 RX ADMIN — HUMAN INSULIN 24 UNIT(S): 100 INJECTION, SUSPENSION SUBCUTANEOUS at 22:45

## 2019-12-11 RX ADMIN — Medication 50 MILLILITER(S): at 05:52

## 2019-12-11 RX ADMIN — LACTULOSE 30 GRAM(S): 10 SOLUTION ORAL at 09:26

## 2019-12-11 RX ADMIN — MIDODRINE HYDROCHLORIDE 20 MILLIGRAM(S): 2.5 TABLET ORAL at 05:55

## 2019-12-11 RX ADMIN — ATORVASTATIN CALCIUM 10 MILLIGRAM(S): 80 TABLET, FILM COATED ORAL at 22:45

## 2019-12-11 RX ADMIN — LACTULOSE 30 GRAM(S): 10 SOLUTION ORAL at 12:49

## 2019-12-11 RX ADMIN — LACTULOSE 30 GRAM(S): 10 SOLUTION ORAL at 02:23

## 2019-12-11 RX ADMIN — TAMSULOSIN HYDROCHLORIDE 0.4 MILLIGRAM(S): 0.4 CAPSULE ORAL at 22:45

## 2019-12-11 RX ADMIN — ERTAPENEM SODIUM 120 MILLIGRAM(S): 1 INJECTION, POWDER, LYOPHILIZED, FOR SOLUTION INTRAMUSCULAR; INTRAVENOUS at 18:11

## 2019-12-11 RX ADMIN — PANTOPRAZOLE SODIUM 40 MILLIGRAM(S): 20 TABLET, DELAYED RELEASE ORAL at 06:07

## 2019-12-11 RX ADMIN — LACTULOSE 30 GRAM(S): 10 SOLUTION ORAL at 22:45

## 2019-12-11 RX ADMIN — Medication 3: at 12:47

## 2019-12-11 RX ADMIN — Medication 3: at 09:26

## 2019-12-11 RX ADMIN — MIDODRINE HYDROCHLORIDE 20 MILLIGRAM(S): 2.5 TABLET ORAL at 12:48

## 2019-12-11 RX ADMIN — Medication 6 UNIT(S): at 13:24

## 2019-12-11 RX ADMIN — MIDODRINE HYDROCHLORIDE 20 MILLIGRAM(S): 2.5 TABLET ORAL at 18:07

## 2019-12-11 RX ADMIN — SODIUM CHLORIDE 2 GRAM(S): 9 INJECTION INTRAMUSCULAR; INTRAVENOUS; SUBCUTANEOUS at 05:55

## 2019-12-11 RX ADMIN — LACTULOSE 30 GRAM(S): 10 SOLUTION ORAL at 18:07

## 2019-12-11 RX ADMIN — Medication 20 MILLIGRAM(S): at 18:11

## 2019-12-11 RX ADMIN — Medication 1 GRAM(S): at 18:07

## 2019-12-11 RX ADMIN — Medication 4: at 18:06

## 2019-12-11 NOTE — PROGRESS NOTE ADULT - PROBLEM SELECTOR PLAN 1
serum creatinine is 1.54   , approximating GFR is increased    ml/min.   There is  progression . No uremic symptoms    pos evidence of anemia .  Fluid status stable.  Will continue to avoid nephrotoxic drugs.  Patient remains asymptomatic.   Continue current therapy.

## 2019-12-11 NOTE — PHARMACOTHERAPY INTERVENTION NOTE - COMMENTS
65 yo M with DM A1C 5.8 on lantus 40 units subcutaneously at bedtime at home. Currently on lantus 20 units subcutaneously at bedtime, humalog 4 units subcutaneously three times daily with meals, with low dose correctional scale. BG in past 24 hours were 264, 247, 253, 288, 283. Recommendation made to increase lantus to 24 units subcutaneously at bedtime and humalog to 6 units subcutaneously three times daily.    Luis Cardenas, PharmD, BCPS  [phone]

## 2019-12-11 NOTE — PROGRESS NOTE ADULT - ASSESSMENT
63 year old male with HTN, DM2, non-alcoholic cirrhosis, who presents with altered mental status. We last saw him during a hospitalization in 10/2019, during which he was significantly volume overloaded and required iv diuresis.  He was initially confused though his mental status has improved. He remains with hyponatremia and high ammonia level.  He is currently being evaluated for liver transplant    Abnormal EKG  - EKG was  notable for prolonged qtc. Now 463 on 12/9  - avoid qtc prolonging medications  - replete K to greater than 4  - check daily mg as well and replete to greater than 2     Diastolic HF   - no sign of acute ischemia  - no significant volume overload on exam. CT without pleural effusions or edema, though notable for possible chest wall hematoma.  - can hold diuretics.   - GI and renal follow up.  - TTE normal LV systolic function EF 65%; LVH, DD Grade I  - cath with normal filling pressures and no significant cad. continue with statin.    HTN  - BP on softer side, though has been better overall  - hold diuretics and anti-hypertensives  - midodrine as needed    - All other workup as per primary team  - Will follow with you. Further cardiac w/u as indicated by clinical course

## 2019-12-11 NOTE — CHART NOTE - NSCHARTNOTEFT_GEN_A_CORE
Nutrition Follow Up Note    Patient seen for: nutrition follow up; Liver Transplant Evaluation    Source: patient, medical record. Information communicated with outpatient Txp RDs and Transplant Hepatology Team.    Chart reviewed, events noted. "63M with HTN, IDDM, BPH, obesity, ESLD 2/2 JEAN cirrhosis (+HE, ascites with SBP) admitted from [facility] on  due to HE/MISA." MELD-Na 33 ().    NUTRITION STATUS: Per Txp Team note: "Noted 100lb weight loss over the last 2months likely from fluid overload." Pt reports consistently good po intake, no indication of malnutrition at this time, however RD will continue to monitor. Hepatic encephalopathy being addressed with lactose and rifaximin; ammonia 130 ().    NUTRITION CONCERNS:     - Hyperglycemia: Pt noted with DM2, HbA1c 4.9%; BG >200mg/dL being addressed with Humalog corrective regimen, and 24 units NPH at bedtime (medium acting insulin, but likely slow-clearing in setting of MISA on CKD)     - MISA on CKD3, likely  pre-renal or hepatorenal syndrome per chart; being addressed with midodrine and albumin. BUN/Creatinine >20 (40/1.54), but creatine trending down.     - Hyponatremia and hypokalemia; being addressed with KCL supplementation and salt tabs 2Gm tid.        *If replete potassium and correct hyperglycemia, sodium would be expected to rise       *Lasix Rx could contribute to potassium wasting (recommend consider a potassium-sparing diuretic)       *Hypokalemia may be contributing to elevated ammonia (by driving glutaminase Rx and ammonia production in the kidney)     - Hypercalcemia: Recommend consider obtaining ionized calcium, PTH, vit D levels. Recommend obtain new phosphorus lab (2.2 on )        *Calcium may improve with Lasix Rx    Diet : Consistent Carbohydrate, Renal diet with snack     PO intake : Pt reports eating >75% of 3 meals daily. Food preferences are being provided, to patient's satisfaction.     Last BM: 12/10 X6, ; multiple daily BMs with lactulose Rx    Weight in k.7 (11-30)    IBW 83.6Kg    Drug Dosing Weight  Weight (kg): 115.5 (2019 18:20)  BMI (kg/m2): 33.6 (2019 18:20)    Pertinent Medications: MEDICATIONS  (STANDING):  atorvastatin 10 milliGRAM(s) Oral at bedtime  dextrose 5%. 1000 milliLiter(s) (50 mL/Hr) IV Continuous <Continuous>  dextrose 50% Injectable 12.5 Gram(s) IV Push once  dextrose 50% Injectable 25 Gram(s) IV Push once  dextrose 50% Injectable 25 Gram(s) IV Push once  ertapenem  IVPB      ertapenem  IVPB 1000 milliGRAM(s) IV Intermittent every 24 hours  furosemide   Injectable 20 milliGRAM(s) IV Push once  insulin lispro (HumaLOG) corrective regimen sliding scale   SubCutaneous three times a day before meals  insulin lispro (HumaLOG) corrective regimen sliding scale   SubCutaneous at bedtime  insulin lispro Injectable (HumaLOG) 6 Unit(s) SubCutaneous three times a day before meals  insulin lispro Injectable (HumaLOG) 6 Unit(s) SubCutaneous once  insulin NPH human recombinant 24 Unit(s) SubCutaneous at bedtime  lactulose Syrup 30 Gram(s) Oral every 4 hours  midodrine. 20 milliGRAM(s) Oral three times a day  pantoprazole    Tablet 40 milliGRAM(s) Oral before breakfast  potassium chloride    Tablet ER 40 milliEquivalent(s) Oral once  rifAXIMin 550 milliGRAM(s) Oral two times a day  sucralfate 1 Gram(s) Oral two times a day  tamsulosin 0.4 milliGRAM(s) Oral at bedtime    MEDICATIONS  (PRN):  dextrose 40% Gel 15 Gram(s) Oral once PRN Blood Glucose LESS THAN 70 milliGRAM(s)/deciliter  glucagon  Injectable 1 milliGRAM(s) IntraMuscular once PRN Glucose LESS THAN 70 milligrams/deciliter    LABS:    @ 08:43: Hemoglobin 7.7<L>, Hematocrit 22.1<L>   @ 07:05: Sodium 131<L>, Potassium 3.1<L>, Chloride 91<L>, Calcium 11.1<H>, BUN 40<H>, Creatinine 1.54<H>, <H>, Alk Phos 107, ALT/SGPT 19, AST/SGOT 30, Total Protein 6.3, Albumin 4.1, Total Bilirubin 8.5<H>,     POCT Blood Glucose.: 272 mg/dL (11 Dec 2019 12:14)  POCT Blood Glucose.: 264 mg/dL (11 Dec 2019 08:35)  POCT Blood Glucose.: 247 mg/dL (10 Dec 2019 22:06)  POCT Blood Glucose.: 253 mg/dL (10 Dec 2019 18:00)    Skin per nursing documentation: no pressure injuries noted  Edema: none noted    Estimated Needs: based on IBW 83.6Kg, with consideration for catabolic illness and CKD3  5485-4141 fahad/day (25-30cal/Kg)  100-117 Gm protein/day (1.2-1.4Gm/Kg)    Previous Nutrition Diagnosis: 1) Increased Nutrient Needs 2) Food & Nutrition Related Knowledge Deficit  Nutrition Diagnosis is: ongoing, being addressed with therapeutic diet and nutrition education    New Nutrition Diagnosis: none     Interventions:     Recommend  1) Liberalize diet to Consistent Carbohydrate diet; remove Renal restriction in setting of hypokalemia and hyponatremia  2) Recommend consider a potassium-sparing diuretic in setting of hypokalemia  3) Recommend consider obtaining ionized calcium, PTH, vit D levels. Recommend obtain new phosphorus lab.  4) Therapeutic diet education provided; RD will continue to reinforce education prior to discharge.   5) From a Nutrition perspective, pt is appropriate for liver transplant.    Information/Assessment communicated with outpatient liver Txp RD and Txp Hepatology Team.    Monitoring and Evaluation:     Continue to monitor nutritional intake, tolerance to diet prescription, weights, labs, skin integrity.    RD remains available upon request and will follow up per protocol.    Myah Argueta, MS RD CDN [facility], Pager # [phone]. Nutrition Follow Up Note    Patient seen for: nutrition follow up; Liver Transplant Evaluation    Source: patient, medical record. Information communicated with outpatient Txp RDs and Transplant Hepatology Team.    Chart reviewed, events noted. "63M with HTN, IDDM, BPH, obesity, ESLD 2/2 JEAN cirrhosis (+HE, ascites with SBP) admitted from [facility] on  due to HE/MISA." MELD-Na 33 ().    NUTRITION STATUS: Per Txp Team note: "Noted 100lb weight loss over the last 2months likely from fluid overload." Pt reports consistently good po intake, no indication of malnutrition at this time, however RD will continue to monitor. Hepatic encephalopathy being addressed with lactose and rifaximin; ammonia 130 ().    NUTRITION CONCERNS:     - Hyperglycemia: Pt noted with DM2, HbA1c 4.9%; BG >200mg/dL being addressed with Humalog corrective regimen, and 24 units NPH at bedtime (medium acting insulin, but likely slow-clearing in setting of MISA on CKD)     - MISA on CKD3, likely  pre-renal or hepatorenal syndrome per chart; being addressed with midodrine and albumin. BUN/Creatinine >20 (40/1.54), but creatine trending down.     - Hyponatremia and hypokalemia; being addressed with KCL supplementation and salt tabs 2Gm tid.        *If replete potassium and correct hyperglycemia, sodium would be expected to rise       *Recommend consider changing from Lasix to a potassium-sparing diuretic       *Hypokalemia may be contributing to elevated ammonia (by driving glutaminase Rx and ammonia production in the kidney)     - Hypercalcemia:        *Recommend consider obtaining ionized calcium, PTH, vit D levels.        *Recommend obtain new phosphorus lab (2.2 on ).        *Calcium may improve with Lasix Rx.    Diet : Consistent Carbohydrate, Renal diet with snack     PO intake : Pt reports eating >75% of 3 meals daily. Food preferences are being provided, to patient's satisfaction.     Last BM: 12/10 X6, ; multiple daily BMs with lactulose Rx    Weight in k.7 (11-30)    IBW 83.6Kg    Drug Dosing Weight  Weight (kg): 115.5 (2019 18:20)  BMI (kg/m2): 33.6 (2019 18:20)    Pertinent Medications: MEDICATIONS  (STANDING):  atorvastatin 10 milliGRAM(s) Oral at bedtime  dextrose 5%. 1000 milliLiter(s) (50 mL/Hr) IV Continuous <Continuous>  dextrose 50% Injectable 12.5 Gram(s) IV Push once  dextrose 50% Injectable 25 Gram(s) IV Push once  dextrose 50% Injectable 25 Gram(s) IV Push once  ertapenem  IVPB      ertapenem  IVPB 1000 milliGRAM(s) IV Intermittent every 24 hours  furosemide   Injectable 20 milliGRAM(s) IV Push once  insulin lispro (HumaLOG) corrective regimen sliding scale   SubCutaneous three times a day before meals  insulin lispro (HumaLOG) corrective regimen sliding scale   SubCutaneous at bedtime  insulin lispro Injectable (HumaLOG) 6 Unit(s) SubCutaneous three times a day before meals  insulin lispro Injectable (HumaLOG) 6 Unit(s) SubCutaneous once  insulin NPH human recombinant 24 Unit(s) SubCutaneous at bedtime  lactulose Syrup 30 Gram(s) Oral every 4 hours  midodrine. 20 milliGRAM(s) Oral three times a day  pantoprazole    Tablet 40 milliGRAM(s) Oral before breakfast  potassium chloride    Tablet ER 40 milliEquivalent(s) Oral once  rifAXIMin 550 milliGRAM(s) Oral two times a day  sucralfate 1 Gram(s) Oral two times a day  tamsulosin 0.4 milliGRAM(s) Oral at bedtime    MEDICATIONS  (PRN):  dextrose 40% Gel 15 Gram(s) Oral once PRN Blood Glucose LESS THAN 70 milliGRAM(s)/deciliter  glucagon  Injectable 1 milliGRAM(s) IntraMuscular once PRN Glucose LESS THAN 70 milligrams/deciliter    LABS:    @ 08:43: Hemoglobin 7.7<L>, Hematocrit 22.1<L>   @ 07:05: Sodium 131<L>, Potassium 3.1<L>, Chloride 91<L>, Calcium 11.1<H>, BUN 40<H>, Creatinine 1.54<H>, <H>, Alk Phos 107, ALT/SGPT 19, AST/SGOT 30, Total Protein 6.3, Albumin 4.1, Total Bilirubin 8.5<H>,     POCT Blood Glucose.: 272 mg/dL (11 Dec 2019 12:14)  POCT Blood Glucose.: 264 mg/dL (11 Dec 2019 08:35)  POCT Blood Glucose.: 247 mg/dL (10 Dec 2019 22:06)  POCT Blood Glucose.: 253 mg/dL (10 Dec 2019 18:00)    Skin per nursing documentation: no pressure injuries noted  Edema: none noted    Estimated Needs: based on IBW 83.6Kg, with consideration for catabolic illness and CKD3  3303-8581 fahad/day (25-30cal/Kg)  100-117 Gm protein/day (1.2-1.4Gm/Kg)    Previous Nutrition Diagnosis: 1) Increased Nutrient Needs 2) Food & Nutrition Related Knowledge Deficit  Nutrition Diagnosis is: ongoing, being addressed with therapeutic diet and nutrition education    New Nutrition Diagnosis: none     Interventions:     Recommend  1) Liberalize diet to Consistent Carbohydrate diet; remove Renal restriction in setting of hypokalemia and hyponatremia  2) Recommend consider a potassium-sparing diuretic in setting of hypokalemia  3) Recommend consider obtaining ionized calcium, PTH, vit D levels. Recommend obtain new phosphorus lab.  4) Therapeutic diet education provided; RD will continue to reinforce education prior to discharge.   5) From a Nutrition perspective, pt is appropriate for liver transplant.    Information/Assessment communicated with outpatient liver Txp RD and Txp Hepatology Team.    Monitoring and Evaluation:     Continue to monitor nutritional intake, tolerance to diet prescription, weights, labs, skin integrity.    RD remains available upon request and will follow up per protocol.    Myah Argueta, MS RD CDN [facility], Pager # [phone].

## 2019-12-11 NOTE — PROGRESS NOTE ADULT - ASSESSMENT
· Assessment		  1. Chronic Normocytic Anemia    -- H/H had been stable but lower today, no obvious bleeding  -- Undetectable Hapto sec to Liver Disease, remainder of hemolytic labs neg  -- Iron Studies c/w Anemia of Chronic Inflammation  -- No B12/Folate Deficiency  -- FOBT pos in the past  -- monitor Counts  -- agree to transfuse 1/2u PRBC today, monitor for bleeding     2. IgG Kappa MGUS(Low Risk)    -- M spike only 200mg/dL  -- likely MGUS  -- bence jonce protein not present  -- monitor clinically for now, repeat levels in 3-4 months    3. cirrhosis   -- per GI and hepatology       4. thrombocytopenia sec to cirrhosis     -- platelet count stable  -- trend  --transfuse for < 10,000 or <50,000 for procedures or evidence of bleeding    Will follow, d/w ZBIGNIEW, [phone]

## 2019-12-11 NOTE — DISCHARGE NOTE PROVIDER - NSDCFUADDAPPT_GEN_ALL_CORE_FT
Please follow up with Hepatologist Dr Hoover 1-2 weeks after discharge.  Please follow up with your primary care provider 1-2 weeks after discharge. Please follow up with Hepatologist Dr Hoover 1-2 weeks after discharge.  Please follow up with your primary care provider 1-2 weeks after discharge.  Follow up with Infectious disease in 4 weeks

## 2019-12-11 NOTE — DISCHARGE NOTE PROVIDER - NSDCMRMEDTOKEN_GEN_ALL_CORE_FT
furosemide 80 mg oral tablet: 1 tab(s) orally 2 times a day  insulin glargine: 40 unit(s) subcutaneous once a day  lactulose 10 g/15 mL oral syrup: 45 milliliter(s) orally every 6 hours   metOLazone 2.5 mg oral tablet: 1 tab(s) orally every other day    Note: Daughter hasn&#x27;t given since it wasn&#x27;t on rehab discharge.  pantoprazole 40 mg oral delayed release tablet: 1 tab(s) orally 2 times a day   phytonadione: 1 tab(s) orally once a day  potassium chloride 10 mEq oral capsule, extended release: 1 cap(s) orally once a day  pravastatin 40 mg oral tablet: 1 tab(s) orally 2 times a day    Note:Pharmacy has once a day.  Reglan 5 mg oral tablet: 1 tab(s) orally once a day    Note:Pharmacy has 3 times a day.  rifAXIMin 550 mg oral tablet: 1 tab(s) orally 2 times a day  spironolactone 25 mg oral tablet: 1 tab(s) orally 2 times a day    Note:Pharmacy has directions as once a day.  sucralfate 1 g oral tablet: 1 tab(s) orally 2 times a day    Note:Pharmacy has 4 times a day.  tamsulosin 0.4 mg oral capsule: 1 cap(s) orally once a day (at bedtime)  ursodiol 300 mg oral capsule: 1 cap(s) orally 2 times a day  Vitamin D3: 1 tab(s) orally once a day furosemide 80 mg oral tablet: 1 tab(s) orally 2 times a day  insulin glargine: 40 unit(s) subcutaneous once a day  lactulose 10 g/15 mL oral syrup: 45 milliliter(s) orally every 6 hours   metOLazone 2.5 mg oral tablet: 1 tab(s) orally every other day    Note: Daughter hasn&#x27;t given since it wasn&#x27;t on rehab discharge.  pantoprazole 40 mg oral delayed release tablet: 1 tab(s) orally 2 times a day   phytonadione: 1 tab(s) orally once a day  potassium chloride 10 mEq oral capsule, extended release: 1 cap(s) orally once a day  pravastatin 40 mg oral tablet: 1 tab(s) orally once a day  Reglan 5 mg oral tablet: 1 tab(s) orally once a day    Note:Pharmacy has 3 times a day.  rifAXIMin 550 mg oral tablet: 1 tab(s) orally 2 times a day  spironolactone 25 mg oral tablet: 1 tab(s) orally 2 times a day    Note:Pharmacy has directions as once a day.  sucralfate 1 g oral tablet: 1 tab(s) orally 2 times a day      tamsulosin 0.4 mg oral capsule: 1 cap(s) orally once a day (at bedtime)  ursodiol 300 mg oral capsule: 1 cap(s) orally 2 times a day  Vitamin D3: 1 tab(s) orally once a day insulin glargine: 28 unit(s) subcutaneous once a day (at bedtime)  insulin lispro: 8 unit(s) subcutaneous 3 times a day (before meals)  lactulose 10 g/15 mL oral syrup: 45 milliliter(s) orally every 4 hours  midodrine 10 mg oral tablet: 2 tab(s) orally 3 times a day  pantoprazole 40 mg oral delayed release tablet: 1 tab(s) orally 2 times a day   pravastatin 40 mg oral tablet: 1 tab(s) orally once a day  rifAXIMin 550 mg oral tablet: 1 tab(s) orally 2 times a day  sucralfate 1 g oral tablet: 1 tab(s) orally 2 times a day      tamsulosin 0.4 mg oral capsule: 1 cap(s) orally once a day (at bedtime)

## 2019-12-11 NOTE — DISCHARGE NOTE PROVIDER - PROVIDER TOKENS
PROVIDER:[TOKEN:[[id number]]],PROVIDER:[TOKEN:[[id number]]] PROVIDER:[TOKEN:[[id number]]],PROVIDER:[TOKEN:[[id number]]],PROVIDER:[TOKEN:[[id number]]] PROVIDER:[TOKEN:[[id number]]],PROVIDER:[TOKEN:[[id number]]],PROVIDER:[TOKEN:[[id number]]],PROVIDER:[TOKEN:[[id number]]]

## 2019-12-11 NOTE — PROGRESS NOTE ADULT - ATTENDING COMMENTS
Hepatology Staff: Yomi Medina MD    I saw and examined the patient along with  Dr. [id number].    Patient Medical Record, hospital course was reviewed and summarized as below:    Hemodynamic Status: stable but confused.  Vitals: Vital Signs Last 24 Hrs  T(C): 36.7 (11 Dec 2019 05:51), Max: 37.1 (10 Dec 2019 21:02)  T(F): 98 (11 Dec 2019 05:51), Max: 98.8 (10 Dec 2019 21:02)  HR: 82 (11 Dec 2019 05:51) (82 - 101)  BP: 131/64 (11 Dec 2019 05:51) (106/66 - 135/69)  RR: 17 (11 Dec 2019 05:51) (17 - 18)  SpO2: 99% (11 Dec 2019 05:51) (96% - 99%)    IV Fluids:   Antibiotics:   ertapenem  IVPB 1000 milliGRAM(s) IV Intermittent every 24 hours    Diuretics: Held  Labs:INR: 2.21 ratio (12-11-19 @ 08:57)  Creatinine, Serum: 1.54 mg/dL (12-11-19 @ 07:05)  Bilirubin Total, Serum: 8.5 mg/dL (12-11-19 @ 07:05)      Imaging Studies:  I/O: I&O's Summary    10 Dec 2019 07:01  -  11 Dec 2019 07:00  --------------------------------------------------------  IN: 540 mL / OUT: 1250 mL / NET: -710 mL      Nutritional Status:   Albumin, Serum: 4.1 g/dL (12-11-19 @ 07:05)    Last 24 hour events: Patient is confused due to HE. K+ is 3.1- might be contributing. Please correct.     Recommendations: Correct hypokalemia. Cont with Rifaxinmine, titrate Lactulose for 3 BMs only. Cont with antibiotics with Ertapenem, We will review his transplant candidacy in the multidisciplinary meeting on Friday ( this week). Mildly elevated PSA- low concern per urology. MISA is improving.     Plan discussed with Primary team.

## 2019-12-11 NOTE — PROGRESS NOTE ADULT - ASSESSMENT
65 yo confused male with JEAN awaiting liver transplant found to have an elevated PSA of 4.81 consulted to r/o malignancy. Pt with INR of 2.1 and PLT 40.    - PSA mildly elevated, could be secondary to evident UTI.  Elevated PSA can be due to Infection/Irritation, BPH, or Prostate Cancer  - Will need to follow up as an outpatient for a repeat PSA before making a decision of whether to biopsy or not.  Would not recommend prostate biopsy at this time as there is no concern for clinically significant disease.  - Additionally, patient may have had outpatient workup for prostate cancer including biopsy within the past year. Would rec following up with his outpatient providers for records.

## 2019-12-11 NOTE — PROGRESS NOTE ADULT - SUBJECTIVE AND OBJECTIVE BOX
Pt seen, feeling fine    MEDICATIONS  (STANDING):  atorvastatin 10 milliGRAM(s) Oral at bedtime  dextrose 5%. 1000 milliLiter(s) (50 mL/Hr) IV Continuous <Continuous>  dextrose 50% Injectable 12.5 Gram(s) IV Push once  dextrose 50% Injectable 25 Gram(s) IV Push once  dextrose 50% Injectable 25 Gram(s) IV Push once  ertapenem  IVPB      ertapenem  IVPB 1000 milliGRAM(s) IV Intermittent every 24 hours  furosemide   Injectable 20 milliGRAM(s) IV Push once  insulin lispro (HumaLOG) corrective regimen sliding scale   SubCutaneous three times a day before meals  insulin lispro (HumaLOG) corrective regimen sliding scale   SubCutaneous at bedtime  insulin lispro Injectable (HumaLOG) 6 Unit(s) SubCutaneous three times a day before meals  insulin NPH human recombinant 24 Unit(s) SubCutaneous at bedtime  lactulose Syrup 30 Gram(s) Oral every 4 hours  midodrine. 20 milliGRAM(s) Oral three times a day  pantoprazole    Tablet 40 milliGRAM(s) Oral before breakfast  potassium chloride    Tablet ER 40 milliEquivalent(s) Oral once  rifAXIMin 550 milliGRAM(s) Oral two times a day  sucralfate 1 Gram(s) Oral two times a day  tamsulosin 0.4 milliGRAM(s) Oral at bedtime    MEDICATIONS  (PRN):  dextrose 40% Gel 15 Gram(s) Oral once PRN Blood Glucose LESS THAN 70 milliGRAM(s)/deciliter  glucagon  Injectable 1 milliGRAM(s) IntraMuscular once PRN Glucose LESS THAN 70 milligrams/deciliter      ROS  No fever, sweats, chills  No epistaxis, HA, sore throat  No CP, SOB, cough, sputum  No n/v/d, abd pain, melena, hematochezia  No edema  No rash  No anxiety  No back pain, joint pain  No bleeding, bruising  No dysuria, hematuria    Vital Signs Last 24 Hrs  T(C): 36.7 (11 Dec 2019 05:51), Max: 37.1 (10 Dec 2019 21:02)  T(F): 98 (11 Dec 2019 05:51), Max: 98.8 (10 Dec 2019 21:02)  HR: 82 (11 Dec 2019 05:51) (82 - 101)  BP: 131/64 (11 Dec 2019 05:51) (106/66 - 131/64)  BP(mean): --  RR: 17 (11 Dec 2019 05:51) (17 - 18)  SpO2: 99% (11 Dec 2019 05:51) (96% - 99%)    PE  NAD  Awake, alert  icteric  RRR  CTAB  Abd soft, NT, ND  No c/c/e  No rash grossly  FROM                          7.7    3.93  )-----------( 40       ( 11 Dec 2019 08:43 )             22.1       12-11    131<L>  |  91<L>  |  40<H>  ----------------------------<  270<H>  3.1<L>   |  24  |  1.54<H>    Ca    11.1<H>      11 Dec 2019 07:05    TPro  6.3  /  Alb  4.1  /  TBili  8.5<H>  /  DBili  x   /  AST  30  /  ALT  19  /  AlkPhos  107  12-11

## 2019-12-11 NOTE — DISCHARGE NOTE PROVIDER - HOSPITAL COURSE
62yo M with Hypertension, DMT2, BPH, obesity, BMI 33, HFpEF grade 1, duodenal ulcer (May, Sep), GAVE s/p APC (Sep), JEAN cirrhosis, who presented from rehab on 11/26 with confusion, weaknees and MISA creat 2.55, hyponatremia Na 125. Patient admitted for hepatic encephalopathy, treated with high dose lactulose and Xifaxan. Patient with abnormal EKG, TTE normal LV systolic function EF 65%; LVH, DD Grade 1. Hepatology evaluated patient, diagnostic cath for pre-transplant evaluation, for now listed for transplant. Patient will be followed by hepatology and ID. Hospital course complicated by UTI on 12/01, s/p antibx.     DCP and medication reconciliation discussed with Dr Prado, patient hemodynamically stable and cleared for discharge. 62yo M with Hypertension, DMT2, BPH, obesity, BMI 33, HFpEF grade 1, duodenal ulcer (May, Sep), GAVE s/p APC (Sep), JEAN cirrhosis, who presented from rehab on 11/26 with confusion, weaknees and MISA creat 2.55, hyponatremia Na 125. Patient admitted for hepatic encephalopathy, treated with high dose lactulose and Xifaxan. Patient with abnormal EKG, TTE normal LV systolic function EF 65%; LVH, DD Grade 1. Cardiology evaluated patient, diagnostic cath for pre-transplant evaluation, for now listed for transplant. Patient will be followed by hepatology and ID. Hospital course complicated by UTI on 12/01, s/p antibx. Diuretics held. Outpt f/u with hepatology to continue transplant w/u. Outpt f/u with ID. PT on UNOS listed for liver transplant (at [facility]) with MELD-Na 27 (12/13/19)        DCP and medication reconciliation discussed with Dr Prado, patient hemodynamically stable and cleared for discharge.

## 2019-12-11 NOTE — PROGRESS NOTE ADULT - SUBJECTIVE AND OBJECTIVE BOX
Patient is a 63y Male whom presented to the hospital with ckd and dena   pateint seen and examined nad , no fever , no chills      PAST MEDICAL & SURGICAL HISTORY:  GIB (gastrointestinal bleeding)  GERD with esophagitis: Gastritis &amp; Non Bleeding Ulcers  Hepatic encephalopathy  Obesity  Fatty liver disease, nonalcoholic  Renal stones: 25 years ago  Hypertension  Neuropathy  Hypercholesteremia  Diabetes  S/P cholecystectomy      MEDICATIONS  (STANDING):  dextrose 5%. 1000 milliLiter(s) (50 mL/Hr) IV Continuous <Continuous>  dextrose 50% Injectable 12.5 Gram(s) IV Push once  dextrose 50% Injectable 25 Gram(s) IV Push once  dextrose 50% Injectable 25 Gram(s) IV Push once  insulin lispro (HumaLOG) corrective regimen sliding scale   SubCutaneous three times a day before meals  insulin lispro (HumaLOG) corrective regimen sliding scale   SubCutaneous at bedtime      Allergies    codeine (Anaphylaxis)    Intolerances    NO  RED MEAT (Unknown)                                     SOCIAL HISTORY:  Denies ETOh,Smoking,     FAMILY HISTORY:  Family history of type 2 diabetes mellitus  Family history of hypertension  Family history of stomach cancer      REVIEW OF SYSTEMS:    CONSTITUTIONAL: No weakness, fevers or chills  RESPIRATORY: No cough, wheezing, hemoptysis; No shortness of breath  CARDIOVASCULAR: No chest pain or palpitations  GASTROINTESTINAL: No abdominal or epigastric pain. No nausea, vomiting,     No diarrhea or constipation. No melena   GENITOURINARY: No dysuria, frequency or hematuria  NEUROLOGICAL: No numbness or weakness  SKIN: dry                                                                                   7.7    3.93  )-----------( 40       ( 11 Dec 2019 08:43 )             22.1       CBC Full  -  ( 11 Dec 2019 08:43 )  WBC Count : 3.93 K/uL  RBC Count : 2.32 M/uL  Hemoglobin : 7.7 g/dL  Hematocrit : 22.1 %  Platelet Count - Automated : 40 K/uL  Mean Cell Volume : 95.3 fl  Mean Cell Hemoglobin : 33.2 pg  Mean Cell Hemoglobin Concentration : 34.8 gm/dL  Auto Neutrophil # : x  Auto Lymphocyte # : x  Auto Monocyte # : x  Auto Eosinophil # : x  Auto Basophil # : x  Auto Neutrophil % : x  Auto Lymphocyte % : x  Auto Monocyte % : x  Auto Eosinophil % : x  Auto Basophil % : x      12-11    131<L>  |  91<L>  |  40<H>  ----------------------------<  270<H>  3.1<L>   |  24  |  1.54<H>    Ca    11.1<H>      11 Dec 2019 07:05    TPro  6.3  /  Alb  4.1  /  TBili  8.5<H>  /  DBili  x   /  AST  30  /  ALT  19  /  AlkPhos  107  12-11      CAPILLARY BLOOD GLUCOSE      POCT Blood Glucose.: 304 mg/dL (11 Dec 2019 13:26)  POCT Blood Glucose.: 272 mg/dL (11 Dec 2019 12:14)  POCT Blood Glucose.: 264 mg/dL (11 Dec 2019 08:35)  POCT Blood Glucose.: 247 mg/dL (10 Dec 2019 22:06)  POCT Blood Glucose.: 253 mg/dL (10 Dec 2019 18:00)      Vital Signs Last 24 Hrs  T(C): 36.8 (11 Dec 2019 14:16), Max: 37.1 (10 Dec 2019 21:02)  T(F): 98.2 (11 Dec 2019 14:16), Max: 98.8 (10 Dec 2019 21:02)  HR: 86 (11 Dec 2019 14:16) (82 - 101)  BP: 128/70 (11 Dec 2019 14:16) (106/66 - 131/64)  BP(mean): --  RR: 18 (11 Dec 2019 14:16) (17 - 18)  SpO2: 98% (11 Dec 2019 14:16) (96% - 99%)    Urinalysis Basic - ( 10 Dec 2019 22:55 )    Color: Dark Red / Appearance: Turbid / S.025 / pH: x  Gluc: x / Ketone: Trace  / Bili: Small / Urobili: <2 mg/dL   Blood: x / Protein: 100 mg/dL / Nitrite: Negative   Leuk Esterase: Moderate / RBC: >720 /HPF / WBC >720 /HPF   Sq Epi: x / Non Sq Epi: 0 /HPF / Bacteria: Few        PT/INR - ( 11 Dec 2019 08:57 )   PT: 26.1 sec;   INR: 2.21 ratio                      PTT - ( 07 Dec 2019 09:42 )  PTT:44.1 sec              PHYSICAL EXAM:    Constitutional: NAD  HEENT: conjunctive   clear   Neck:  No JVD  Respiratory: CTAB  Cardiovascular: S1 and S2  Gastrointestinal: BS+, soft, NT/ND  Extremities: No peripheral edema  Neurological:  no focal deficits  Psychiatric: Normal mood, normal affect  Skin: dry   Access: Not applicable

## 2019-12-11 NOTE — PROGRESS NOTE ADULT - PROBLEM SELECTOR PLAN 1
- continue to titrate lactulose for 3 BM/day; please give Lactulose Enemas if pt unable to tolerate PO intake   - continue xifaxan bid   - diet as tolerated with aspiration precautions

## 2019-12-11 NOTE — PROGRESS NOTE ADULT - SUBJECTIVE AND OBJECTIVE BOX
Subjective    Seen & examined at bedside  PSA reviewed and discussed at length with patient    Objective    Vital signs  T(F): , Max: 98.8 (12-10-19 @ 21:02)  HR: 86 (12-11-19 @ 14:16)  BP: 128/70 (12-11-19 @ 14:16)  SpO2: 98% (12-11-19 @ 14:16)  Wt(kg): --    Output     12-10 @ 07:01  -  12-11 @ 07:00  --------------------------------------------------------  IN: 540 mL / OUT: 1250 mL / NET: -710 mL      Physical Exam  Gen: Awake, NAD  Pulm: No Respiratory Distress  GI: Soft, Distended, NT  : prostate mildly enlarged without nodules, induration or tenderness   Msk: marked weakness in bilateral upper and lower extremities     Labs  12-11 @ 08:43  WBC 3.93  / Hct 22.1  / SCr --       12-11 @ 07:05  WBC --    / Hct --    / SCr 1.54     Urine Cx: Culture - Urine (12.06.19 @ 10:30)    -  Trimethoprim/Sulfamethoxazole: S <=2/38    -  Trimethoprim/Sulfamethoxazole: S <=2/38    -  Tobramycin: S <=4    -  Tobramycin: S <=4    -  Piperacillin/Tazobactam: R <=16    -  Piperacillin/Tazobactam: S <=16    -  Tigecycline: S <=2    -  Tigecycline: S <=2    -  Meropenem: S <=1    -  Meropenem: S <=1    -  Nitrofurantoin: S <=32 Should not be used to treat pyelonephritis    -  Nitrofurantoin: I 64 Should not be used to treat pyelonephritis    -  Imipenem: S <=1    -  Imipenem: S <=1    -  Levofloxacin: R >4    -  Levofloxacin: S <=2    -  Ertapenem: S <=1    -  Gentamicin: S <=4    -  Gentamicin: S <=4    -  Ciprofloxacin: R >2    -  Ciprofloxacin: S <=1    -  Ceftriaxone: R >32 Enterobacter, Citrobacter, and Serratia may develop resistance during prolonged therapy    -  Ceftriaxone: S <=1 Enterobacter, Citrobacter, and Serratia may develop resistance during prolonged therapy    -  Cefoxitin: S <=8    -  Cefoxitin: S <=8    -  Cefepime: R >16    -  Cefepime: S <=4    -  Cefazolin: R >16 (MIC_CL_COM_ENTERIC_CEFAZU) For uncomplicated UTI with K. pneumoniae, E. coli, or P. mirablis: LEONARDO <=16 is sensitive and LEONARDO >=32 is resistant. This also predicts results for oral agents cefaclor, cefdinir, cefpodoxime, cefprozil, cefuroxime axetil, cephalexin and locarbef for uncomplicated UTI. Note that some isolates may be susceptible to these agents while testing resistant to cefazolin.    -  Cefazolin: S <=8 (MIC_CL_COM_ENTERIC_CEFAZU) For uncomplicated UTI with K. pneumoniae, E. coli, or P. mirablis: LEONARDO <=16 is sensitive and LEONARDO >=32 is resistant. This also predicts results for oral agents cefaclor, cefdinir, cefpodoxime, cefprozil, cefuroxime axetil, cephalexin and locarbef for uncomplicated UTI. Note that some isolates may be susceptible to these agents while testing resistant to cefazolin.    -  Amikacin: S <=16    -  Amikacin: S <=16    -  Aztreonam: R >16    -  Aztreonam: S <=4    -  Ampicillin: R >16 These ampicillin results predict results for amoxicillin    -  Ampicillin: R >16 These ampicillin results predict results for amoxicillin    -  Ampicillin/Sulbactam: R 16/8 Enterobacter, Citrobacter, and Serratia may develop resistance during prolonged therapy (3-4 days)    -  Ampicillin/Sulbactam: S <=8/4 Enterobacter, Citrobacter, and Serratia may develop resistance during prolonged therapy (3-4 days)    Specimen Source: .Urine Clean Catch (Midstream)    Culture Results:   >100,000 CFU/ml Escherichia coli ESBL  >100,000 CFU/ml Klebsiella pneumoniae    Organism Identification: Gram Negative Rods  Gram Negative Rods    Organism: Gram Negative Rods    Organism: Gram Negative Rods    Method Type: LEONARDO    Method Type: LEONARDO    Imaging    < from: MR Abdomen w/wo IV Cont (12.06.19 @ 13:46) >  FINDINGS:    LOWER CHEST: Within normal limits.    LIVER: Cirrhotic. No steatosis. No focal lesion.  BILE DUCTS: Normal caliber.  GALLBLADDER: Cholecystectomy.  SPLEEN: Within normal limits.  PANCREAS: Within normal limits.  ADRENALS: Within normal limits.  KIDNEYS/URETERS: Within normal limits.    VISUALIZED PORTIONS:    BOWEL: Within normal limits.   PERITONEUM: No ascites.  VESSELS: Standard hepatic arterial anatomy. Celiac axis and SMA are   widely patent. Main portal vein is patent however diminutive. SMV is   widely patent. Extensive upper abdominal and retroperitoneal varices   consistent with portal hypertension.  RETROPERITONEUM/LYMPH NODES: No lymphadenopathy.    ABDOMINAL WALL: Within normal limits.  BONES: Degenerative changes.    IMPRESSION:     Cirrhosis with evidence of portal hypertension.    No focal hepatic lesion.    Standard hepatic arterial anatomy with widely patent celiac axis and SMA.   SMV and portal veins are patent as above.    < end of copied text >

## 2019-12-11 NOTE — PROGRESS NOTE ADULT - SUBJECTIVE AND OBJECTIVE BOX
SUBJECTIVE / OVERNIGHT EVENTS: pt denies headache, nausea,v   chest pain, shortness of breath ,     MEDICATIONS  (STANDING):  atorvastatin 10 milliGRAM(s) Oral at bedtime  dextrose 5%. 1000 milliLiter(s) (50 mL/Hr) IV Continuous <Continuous>  dextrose 50% Injectable 12.5 Gram(s) IV Push once  dextrose 50% Injectable 25 Gram(s) IV Push once  dextrose 50% Injectable 25 Gram(s) IV Push once  ertapenem  IVPB      ertapenem  IVPB 1000 milliGRAM(s) IV Intermittent every 24 hours  insulin lispro (HumaLOG) corrective regimen sliding scale   SubCutaneous three times a day before meals  insulin lispro (HumaLOG) corrective regimen sliding scale   SubCutaneous at bedtime  insulin lispro Injectable (HumaLOG) 6 Unit(s) SubCutaneous three times a day before meals  insulin NPH human recombinant 24 Unit(s) SubCutaneous at bedtime  lactulose Syrup 30 Gram(s) Oral every 4 hours  midodrine. 20 milliGRAM(s) Oral three times a day  pantoprazole    Tablet 40 milliGRAM(s) Oral before breakfast  rifAXIMin 550 milliGRAM(s) Oral two times a day  sodium chloride 2 Gram(s) Oral three times a day  sucralfate 1 Gram(s) Oral two times a day  tamsulosin 0.4 milliGRAM(s) Oral at bedtime    MEDICATIONS  (PRN):  dextrose 40% Gel 15 Gram(s) Oral once PRN Blood Glucose LESS THAN 70 milliGRAM(s)/deciliter  glucagon  Injectable 1 milliGRAM(s) IntraMuscular once PRN Glucose LESS THAN 70 milligrams/deciliter    Vital Signs Last 24 Hrs  T(C): 36.9 (11 Dec 2019 20:57), Max: 36.9 (11 Dec 2019 04:50)  T(F): 98.5 (11 Dec 2019 20:57), Max: 98.5 (11 Dec 2019 20:57)  HR: 80 (11 Dec 2019 20:57) (80 - 90)  BP: 124/61 (11 Dec 2019 20:57) (124/61 - 131/64)  BP(mean): --  RR: 17 (11 Dec 2019 20:57) (17 - 18)  SpO2: 95% (11 Dec 2019 20:57) (95% - 99%)    Constitutional: No fever, fatigue  Skin: No rash.  Eyes: No recent vision problems or eye pain.  ENT: No congestion, ear pain, or sore throat.  Cardiovascular: No chest pain or palpation.  Respiratory: No cough, shortness of breath, congestion, or wheezing.  Gastrointestinal: No abdominal pain, nausea, vomiting, or diarrhea.  Genitourinary: No dysuria.  Musculoskeletal: No joint swelling.  Neurologic: No headache.    PHYSICAL EXAM:  GENERAL: NAD  EYES: EOMI, PERRLA  NECK: Supple, No JVD  CHEST/LUNG: dec breath sounds at bases   HEART:  S1 , S2 +  ABDOMEN: soft , bs+, mild distension + , no tenderness  EXTREMITIES:  no edema  NEUROLOGY:alert awake oriented   flap tremor+    LABS:      131<L>  |  91<L>  |  40<H>  ----------------------------<  270<H>  3.1<L>   |  24  |  1.54<H>    Ca    11.1<H>      11 Dec 2019 07:05    TPro  6.3  /  Alb  4.1  /  TBili  8.5<H>  /  DBili      /  AST  30  /  ALT  19  /  AlkPhos  107      Creatinine Trend: 1.54 <--, 1.86 <--, 1.80 <--, 1.81 <--, 1.75 <--, 1.76 <--, 1.80 <--, 1.88 <--, 1.64 <--, 1.70 <--                        7.7    3.93  )-----------( 40       ( 11 Dec 2019 08:43 )             22.1     Urine Studies:  Urinalysis Basic - ( 10 Dec 2019 22:55 )    Color: Dark Red / Appearance: Turbid / S.025 / pH:   Gluc:  / Ketone: Trace  / Bili: Small / Urobili: <2 mg/dL   Blood:  / Protein: 100 mg/dL / Nitrite: Negative   Leuk Esterase: Moderate / RBC: >720 /HPF / WBC >720 /HPF   Sq Epi:  / Non Sq Epi: 0 /HPF / Bacteria: Few      Sodium, Random Urine: <35 mmol/L ( @ 16:31)  Creatinine, Random Urine: 118 mg/dL ( @ 03:54)  Protein/Creatinine Ratio Calculation: 0.9 Ratio ( @ 03:54)          LIVER FUNCTIONS - ( 11 Dec 2019 07:05 )  Alb: 4.1 g/dL / Pro: 6.3 g/dL / ALK PHOS: 107 U/L / ALT: 19 U/L / AST: 30 U/L / GGT: x           PT/INR - ( 11 Dec 2019 08:57 )   PT: 26.1 sec;   INR: 2.21 ratio           Sq Epi:  / Non Sq Epi: 0 /hpf / Bacteria: Many      Sodium, Random Urine: <35 mmol/L ( @ 16:31)  Creatinine, Random Urine: 118 mg/dL ( @ 03:54)  Protein/Creatinine Ratio Calculation: 0.9 Ratio ( @ 03:54)          LIVER FUNCTIONS - ( 09 Dec 2019 07:01 )  Alb: 3.8 g/dL / Pro: 6.4 g/dL / ALK PHOS: 124 U/L / ALT: 17 U/L / AST: 25 U/L / GGT: x           PT/INR - ( 09 Dec 2019 08:21 )   PT: 24.4 sec;   INR: 2.09 ratio

## 2019-12-11 NOTE — DISCHARGE NOTE PROVIDER - CARE PROVIDERS DIRECT ADDRESSES
,DirectAddress_Unknown,[email or web address] ,DirectAddress_Unknown,[email or web address] ,[email or web address],[email or web address]

## 2019-12-11 NOTE — PROGRESS NOTE ADULT - ASSESSMENT
63M w/ pmh HTN, HLD, DM, non-alcoholic cirrhosis, chronic thrombocytopenia -- sent from rehab for worsening jaundice, dizzy/weakness/falls x 4 days.  h/o strep bacteremia 10/19 s/p RX  Was being observed off abx  12/6 urine Cx(unclear whys ent) with E coli and kleb-ESBL  Patient does not provide reliable ROS  No bob  No other systemic symptoms though ? confused/encephalopathic  ? colonization/contamination vs UTI  Rec:  1)follow repeat  urine Cx  2) monitor clinically  3) Pending above empiric Invanz 1 gm IV q 24  Adjust dosage per renal function   Will tailor plan for ID issues  per course,results.Will defer to primary team on management of other issues.  case d/w Med NP    Will Follow.  Beeper [id number]-bctz/afterhours/No response-[id number]

## 2019-12-11 NOTE — DISCHARGE NOTE PROVIDER - NSDCCPCAREPLAN_GEN_ALL_CORE_FT
PRINCIPAL DISCHARGE DIAGNOSIS  Diagnosis: Hepatic encephalopathy  Assessment and Plan of Treatment: Resolved      SECONDARY DISCHARGE DIAGNOSES  Diagnosis: MISA (acute kidney injury)  Assessment and Plan of Treatment: Resolved    Diagnosis: Urinary tract infection without hematuria, site unspecified  Assessment and Plan of Treatment: HOME CARE INSTRUCTIONS  f you were prescribed antibiotics, take them exactly as your caregiver instructs you. Finish the medication even if you feel better after you have only taken some of the medication.  Drink enough water and fluids to keep your urine clear or pale yellow.  Avoid caffeine, tea, and carbonated beverages. They tend to irritate your bladder.  Empty your bladder often. Avoid holding urine for long periods of time.  After a bowel movement, women should cleanse from front to back. Use each tissue only once.  SEEK MEDICAL CARE IF:  You have back pain.  You develop a fever.  Your symptoms do not begin to resolve within 3 days.  SEEK IMMEDIATE MEDICAL CARE IF:  You have severe back pain or lower abdominal pain.  You develop chills.  You have nausea or vomiting.  You have continued burning or discomfort with urination.    Diagnosis: Hepatic cirrhosis, unspecified hepatic cirrhosis type, unspecified whether ascites present  Assessment and Plan of Treatment: Cirrhosis is scarring of the liver which can cause heavy bleeding, swelling, & breathing problems  Call your doctor with belly & leg swelling, shortness of breath, bruising or bleeding easily, feeling full, tired, trouble getting enough or too much sleep, yellowing of skin or whites of eye, sudden confusion, or coma  Causes may include heavy alcohol use, hepatitis B or C, or fatty liver.    You can help yourself by avoiding alcohol, speak with your doctor before starting on any new medication, herbs, vitamins, or supplements which may cause more damage to the liver  Treatment includes treating the cause, reducing blood pressure, low salt diet, diuretics, belly fluid drainage, treating infections, getting vaccinations to prevent common infections, &/or lactulose to improve confusion  It is important to get help if you have an alcohol problem, use condoms when having sex, and nor sharing drug needles if this applies to you

## 2019-12-11 NOTE — PROGRESS NOTE ADULT - SUBJECTIVE AND OBJECTIVE BOX
Chief Complaint:  Patient is a 64y old  Male who presents with a chief complaint of difficulty to ambulate/ weakness (11 Dec 2019 09:47)      Interval Events: No adverse events overnight.  Still a bit confused today.    Allergies:  codeine (Anaphylaxis)  NO  RED MEAT (Unknown)      Hospital Medications:  atorvastatin 10 milliGRAM(s) Oral at bedtime  dextrose 40% Gel 15 Gram(s) Oral once PRN  dextrose 5%. 1000 milliLiter(s) IV Continuous <Continuous>  dextrose 50% Injectable 12.5 Gram(s) IV Push once  dextrose 50% Injectable 25 Gram(s) IV Push once  dextrose 50% Injectable 25 Gram(s) IV Push once  ertapenem  IVPB      ertapenem  IVPB 1000 milliGRAM(s) IV Intermittent every 24 hours  glucagon  Injectable 1 milliGRAM(s) IntraMuscular once PRN  insulin glargine Injectable (LANTUS) 20 Unit(s) SubCutaneous at bedtime  insulin lispro (HumaLOG) corrective regimen sliding scale   SubCutaneous three times a day before meals  insulin lispro (HumaLOG) corrective regimen sliding scale   SubCutaneous at bedtime  insulin lispro Injectable (HumaLOG) 4 Unit(s) SubCutaneous three times a day before meals  lactulose Syrup 30 Gram(s) Oral every 4 hours  midodrine. 20 milliGRAM(s) Oral three times a day  pantoprazole    Tablet 40 milliGRAM(s) Oral before breakfast  rifAXIMin 550 milliGRAM(s) Oral two times a day  sodium chloride 2 Gram(s) Oral three times a day  sucralfate 1 Gram(s) Oral two times a day  tamsulosin 0.4 milliGRAM(s) Oral at bedtime      PMHX/PSHX:  GIB (gastrointestinal bleeding)  GERD with esophagitis  Hepatic encephalopathy  Obesity  Fatty liver disease, nonalcoholic  Renal stones  Hypertension  Neuropathy  Hypercholesteremia  Diabetes  S/P cholecystectomy  No significant past surgical history      Family history:  Family history of type 2 diabetes mellitus  Family history of hypertension  Family history of stomach cancer  No pertinent family history in first degree relatives      ROS:     General:  No wt loss, fevers, chills, night sweats, fatigue,   Eyes:  Good vision, no reported pain  ENT:  No sore throat, pain, runny nose, dysphagia  CV:  No pain, palpitations, hypo/hypertension  Resp:  No dyspnea, cough, tachypnea, wheezing  GI:  See HPI  :  No pain, bleeding, incontinence, nocturia  Muscle:  No pain, weakness  Neuro:  No weakness, tingling, memory problems  Psych:  No fatigue, insomnia, mood problems, depression  Endocrine:  No polyuria, polydipsia, cold/heat intolerance  Heme:  No petechiae, ecchymosis, easy bruisability  Skin:  No rash, edema      PHYSICAL EXAM:     GENERAL: NAD  HEENT:  NC/AT  CHEST:  Full & symmetric excursion, no increased effort  ABDOMEN:  Soft, non-tender, non-distended, +BS  EXTREMITIES:  no edema  SKIN:  No rash  NEURO:  Alert      Vital Signs:  Vital Signs Last 24 Hrs  T(C): 36.7 (11 Dec 2019 05:51), Max: 37.1 (10 Dec 2019 21:02)  T(F): 98 (11 Dec 2019 05:51), Max: 98.8 (10 Dec 2019 21:02)  HR: 82 (11 Dec 2019 05:51) (82 - 101)  BP: 131/64 (11 Dec 2019 05:51) (106/66 - 135/69)  BP(mean): --  RR: 17 (11 Dec 2019 05:51) (17 - 18)  SpO2: 99% (11 Dec 2019 05:51) (96% - 99%)  Daily     Daily     LABS:                        7.7    3.93  )-----------( 40       ( 11 Dec 2019 08:43 )             22.1     12    131<L>  |  91<L>  |  40<H>  ----------------------------<  270<H>  3.1<L>   |  24  |  1.54<H>    Ca    11.1<H>      11 Dec 2019 07:05    TPro  6.3  /  Alb  4.1  /  TBili  8.5<H>  /  DBili  x   /  AST  30  /  ALT  19  /  AlkPhos  107  12-11    LIVER FUNCTIONS - ( 11 Dec 2019 07:05 )  Alb: 4.1 g/dL / Pro: 6.3 g/dL / ALK PHOS: 107 U/L / ALT: 19 U/L / AST: 30 U/L / GGT: x           PT/INR - ( 11 Dec 2019 08:57 )   PT: 26.1 sec;   INR: 2.21 ratio           Urinalysis Basic - ( 10 Dec 2019 22:55 )    Color: Dark Red / Appearance: Turbid / S.025 / pH: x  Gluc: x / Ketone: Trace  / Bili: Small / Urobili: <2 mg/dL   Blood: x / Protein: 100 mg/dL / Nitrite: Negative   Leuk Esterase: Moderate / RBC: >720 /HPF / WBC >720 /HPF   Sq Epi: x / Non Sq Epi: 0 /HPF / Bacteria: Few          Imaging:  reviewed

## 2019-12-11 NOTE — DISCHARGE NOTE PROVIDER - CARE PROVIDER_API CALL
Silvana Hoover)  Hematology; Internal Medicine; Medical Oncology  [address]  [place] [address]  Phone: [phone]  Fax: [phone]  Follow Up Time:     Dmitri Rose)  Internal Medicine  [address] [place] [address]  Phone: [phone]  Fax: [phone]  Follow Up Time: Silvana Hoover)  Hematology; Internal Medicine; Medical Oncology  [address]  [place] [address]  Phone: [phone]  Fax: [phone]  Follow Up Time:     Dmitri Rose)  Internal Medicine  [address] [place] [address]  Phone: [phone]  Fax: [phone]  Follow Up Time:     Eusebio Pérez)  Internal Medicine  [address] [place] [address]  Phone: [phone]  Fax: [phone]  Follow Up Time: Silvana Hoover)  Hematology; Internal Medicine; Medical Oncology  [address]  [place] [address]  Phone: [phone]  Fax: [phone]  Follow Up Time:     Dimtri Rose)  Internal Medicine  [address] [place] [address]  Phone: [phone]  Fax: [phone]  Follow Up Time:     Eusebio Pérez)  Internal Medicine  [address] [place] [address]  Phone: [phone]  Fax: [phone]  Follow Up Time:     Letitia Mo)  Gastroenterology; Internal Medicine  [address] [place] [address]  Phone: [phone]  Fax: [phone]  Follow Up Time:

## 2019-12-11 NOTE — PROGRESS NOTE ADULT - SUBJECTIVE AND OBJECTIVE BOX
[facility] -- Sushila Dhaliwal, Morenita Camacho Pannella, Patel, Savella  Office # [phone]      Follow Up:  abn ekg, cirrhosis    Subjective/Observations:   Patient seen and examined. Events noted. Resting comfortably in bed. No complaints of chest pain, dyspnea, or palpitations reported. No signs of orthopnea or PND.     REVIEW OF SYSTEMS: All other review of systems is negative unless indicated above    PAST MEDICAL & SURGICAL HISTORY:  GIB (gastrointestinal bleeding)  GERD with esophagitis: Gastritis &amp; Non Bleeding Ulcers  Hepatic encephalopathy  Obesity  Fatty liver disease, nonalcoholic  Renal stones: 25 years ago  Hypertension  Neuropathy  Hypercholesteremia  Diabetes  S/P cholecystectomy      MEDICATIONS  (STANDING):  atorvastatin 10 milliGRAM(s) Oral at bedtime  dextrose 5%. 1000 milliLiter(s) (50 mL/Hr) IV Continuous <Continuous>  dextrose 50% Injectable 12.5 Gram(s) IV Push once  dextrose 50% Injectable 25 Gram(s) IV Push once  dextrose 50% Injectable 25 Gram(s) IV Push once  ertapenem  IVPB      ertapenem  IVPB 1000 milliGRAM(s) IV Intermittent every 24 hours  insulin glargine Injectable (LANTUS) 20 Unit(s) SubCutaneous at bedtime  insulin lispro (HumaLOG) corrective regimen sliding scale   SubCutaneous three times a day before meals  insulin lispro (HumaLOG) corrective regimen sliding scale   SubCutaneous at bedtime  insulin lispro Injectable (HumaLOG) 4 Unit(s) SubCutaneous three times a day before meals  lactulose Syrup 30 Gram(s) Oral every 4 hours  midodrine. 20 milliGRAM(s) Oral three times a day  pantoprazole    Tablet 40 milliGRAM(s) Oral before breakfast  rifAXIMin 550 milliGRAM(s) Oral two times a day  sodium chloride 2 Gram(s) Oral three times a day  sucralfate 1 Gram(s) Oral two times a day  tamsulosin 0.4 milliGRAM(s) Oral at bedtime    MEDICATIONS  (PRN):  dextrose 40% Gel 15 Gram(s) Oral once PRN Blood Glucose LESS THAN 70 milliGRAM(s)/deciliter  glucagon  Injectable 1 milliGRAM(s) IntraMuscular once PRN Glucose LESS THAN 70 milligrams/deciliter      Allergies    codeine (Anaphylaxis)    Intolerances    NO  RED MEAT (Unknown)          Vital Signs Last 24 Hrs  T(C): 36.7 (11 Dec 2019 05:51), Max: 37.1 (10 Dec 2019 21:02)  T(F): 98 (11 Dec 2019 05:51), Max: 98.8 (10 Dec 2019 21:02)  HR: 82 (11 Dec 2019 05:51) (82 - 101)  BP: 131/64 (11 Dec 2019 05:51) (106/66 - 135/69)  BP(mean): --  RR: 17 (11 Dec 2019 05:51) (17 - 18)  SpO2: 99% (11 Dec 2019 05:51) (96% - 99%)    I&O's Summary    10 Dec 2019 07:01  -  11 Dec 2019 07:00  --------------------------------------------------------  IN: 540 mL / OUT: 1250 mL / NET: -710 mL          PHYSICAL EXAM:     Constitutional: NAD, awake    HEENT: Moist Mucous Membranes, Anicteric  Pulmonary: Decreased breath sounds b/l. No rales, crackles or wheeze appreciated.   Cardiovascular: Regular, S1 and S2, No murmurs, rubs, gallops or clicks  Gastrointestinal: Bowel Sounds present, soft, nontender.   Lymph: No peripheral edema. No lymphadenopathy.  Skin: No visible rashes or ulcers.  Psych:  Mood & affect appropriate for situation    LABS: All Labs Reviewed:                        7.7    3.93  )-----------( 40       ( 11 Dec 2019 08:43 )             22.1                         9.1    4.80  )-----------( 39       ( 10 Dec 2019 09:09 )             26.4                         8.4    5.96  )-----------( 40       ( 09 Dec 2019 07:43 )             24.3     11 Dec 2019 07:05    131    |  91     |  40     ----------------------------<  270    3.1     |  24     |  1.54   10 Dec 2019 07:14    132    |  91     |  43     ----------------------------<  250    3.8     |  23     |  1.86   09 Dec 2019 22:28    130    |  90     |  40     ----------------------------<  250    4.0     |  24     |  1.80     Ca    11.1       11 Dec 2019 07:05  Ca    10.5       10 Dec 2019 07:14  Ca    10.9       09 Dec 2019 22:28    TPro  6.3    /  Alb  4.1    /  TBili  8.5    /  DBili  x      /  AST  30     /  ALT  19     /  AlkPhos  107    11 Dec 2019 07:05  TPro  6.4    /  Alb  3.8    /  TBili  7.2    /  DBili  x      /  AST  25     /  ALT  17     /  AlkPhos  124    09 Dec 2019 07:01    PT/INR - ( 11 Dec 2019 08:57 )   PT: 26.1 sec;   INR: 2.21 ratio

## 2019-12-11 NOTE — PROGRESS NOTE ADULT - SUBJECTIVE AND OBJECTIVE BOX
INTERVAL HPI/OVERNIGHT EVENTS:    pt seen and examined  he denies abdominal pain, n/v  remains confused however mental status improved from yesterday   + 1 large bm this morning so far    MEDICATIONS  (STANDING):  atorvastatin 10 milliGRAM(s) Oral at bedtime  dextrose 5%. 1000 milliLiter(s) (50 mL/Hr) IV Continuous <Continuous>  dextrose 50% Injectable 12.5 Gram(s) IV Push once  dextrose 50% Injectable 25 Gram(s) IV Push once  dextrose 50% Injectable 25 Gram(s) IV Push once  ertapenem  IVPB      ertapenem  IVPB 1000 milliGRAM(s) IV Intermittent every 24 hours  insulin glargine Injectable (LANTUS) 20 Unit(s) SubCutaneous at bedtime  insulin lispro (HumaLOG) corrective regimen sliding scale   SubCutaneous three times a day before meals  insulin lispro (HumaLOG) corrective regimen sliding scale   SubCutaneous at bedtime  insulin lispro Injectable (HumaLOG) 4 Unit(s) SubCutaneous three times a day before meals  lactulose Syrup 30 Gram(s) Oral every 4 hours  midodrine. 20 milliGRAM(s) Oral three times a day  pantoprazole    Tablet 40 milliGRAM(s) Oral before breakfast  rifAXIMin 550 milliGRAM(s) Oral two times a day  sodium chloride 2 Gram(s) Oral three times a day  sucralfate 1 Gram(s) Oral two times a day  tamsulosin 0.4 milliGRAM(s) Oral at bedtime    MEDICATIONS  (PRN):  dextrose 40% Gel 15 Gram(s) Oral once PRN Blood Glucose LESS THAN 70 milliGRAM(s)/deciliter  glucagon  Injectable 1 milliGRAM(s) IntraMuscular once PRN Glucose LESS THAN 70 milligrams/deciliter      Allergies    codeine (Anaphylaxis)    Intolerances    NO  RED MEAT (Unknown)      Review of Systems:    General:  No wt loss, fevers, chills, night sweats,fatigue,   Eyes:  Good vision, no reported pain  ENT:  No sore throat, pain, runny nose, dysphagia  CV:  No pain, palpitations, hypo/hypertension  Resp:  No dyspnea, cough, tachypnea, wheezing  GI:  No pain, No nausea, No vomiting, No diarrhea, No constipation, No weight loss, No fever, No pruritis, No rectal bleeding, No melena, No dysphagia  :  No pain, bleeding, incontinence, nocturia  Muscle:  No pain, weakness  Neuro:  No weakness, tingling, memory problems  Psych:  No fatigue, insomnia, mood problems, depression  Endocrine:  No polyuria, polydypsia, cold/heat intolerance  Heme:  No petechiae, ecchymosis, easy bruisability  Skin:  No rash, tattoos, scars, edema      Vital Signs Last 24 Hrs  T(C): 36.7 (11 Dec 2019 05:51), Max: 37.1 (10 Dec 2019 21:02)  T(F): 98 (11 Dec 2019 05:51), Max: 98.8 (10 Dec 2019 21:02)  HR: 82 (11 Dec 2019 05:51) (82 - 101)  BP: 131/64 (11 Dec 2019 05:51) (106/66 - 135/69)  BP(mean): --  RR: 17 (11 Dec 2019 05:51) (17 - 18)  SpO2: 99% (11 Dec 2019 05:51) (96% - 99%)    PHYSICAL EXAM:    Constitutional: NAD, well-developed  HEENT: EOMI, throat clear  Neck: No LAD, supple  Respiratory: CTA and P  Cardiovascular: S1 and S2, RRR, no M  Gastrointestinal: BS+, soft, NT/ND, neg HSM,  Extremities: No peripheral edema, neg clubing, cyanosis  Vascular: 2+ peripheral pulses  Neurological: A/O x 2- 3, no focal deficits  Psychiatric: Normal mood, normal affect  Skin: No rashes      LABS:                        7.7    3.93  )-----------( 40       ( 11 Dec 2019 08:43 )             22.1     12-11    131<L>  |  91<L>  |  40<H>  ----------------------------<  270<H>  3.1<L>   |  24  |  1.54<H>    Ca    11.1<H>      11 Dec 2019 07:05    TPro  6.3  /  Alb  4.1  /  TBili  8.5<H>  /  DBili  x   /  AST  30  /  ALT  19  /  AlkPhos  107  12-11    PT/INR - ( 11 Dec 2019 08:57 )   PT: 26.1 sec;   INR: 2.21 ratio           Urinalysis Basic - ( 10 Dec 2019 22:55 )    Color: Dark Red / Appearance: Turbid / S.025 / pH: x  Gluc: x / Ketone: Trace  / Bili: Small / Urobili: <2 mg/dL   Blood: x / Protein: 100 mg/dL / Nitrite: Negative   Leuk Esterase: Moderate / RBC: >720 /HPF / WBC >720 /HPF   Sq Epi: x / Non Sq Epi: 0 /HPF / Bacteria: Few        RADIOLOGY & ADDITIONAL TESTS:

## 2019-12-11 NOTE — PROGRESS NOTE ADULT - ASSESSMENT
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    #Presumed JEAN cirrhosis, MELD-Na 12/11 30      - varices: none on EGD outside in 10/2019      - HE: present, on lactulose and Xifaxan       - ascites: none currently, but history of ascites with SBP      - HCC: none on US 11/27, AFP 23 in 10/19; MRI w/o lesions from 12/6      - Vascular assessment: poor US doppler exam for eval of vessels due to body habitus       - Liver Workup: HAV immune, HBV non-immune, HCV neg, HIV neg, iron studies consistent wiith AoCD, RPR neg, QG neg, CMV neg, EBV IgG pos only, HSV1 pos, ASMA neg, AMA neg, ceruloplasmin LOW 12      - Age appropriate screening: colonoscopy 5-6 months ago per patient      - Dental evaluation: >1 year ago, no loose teeth per patient      - Cardiac evaluation: LHC completed 12/5, mild-mod non-occlusive CAD with low filling pressures, no interventions needed       - Blood type A+, Ab neg      - Previous transplant workup: none      - Social: retired, previous , lives in [place] with wife and son, previously highly functional (before October), Insurance -Medicare HMO  # MISA, Cr stable, likely pre-renal vs HRS; urine sodium still low, likely HRS  # Chest wall hematoma  # T2DM  # Hypertension (currently normotensive)    Recs:  - continue IV Albumin q8 hrs today (Cr improved with albumin yesterday)  - will review imaging with radiology to eval for splenorenal shunt and whether it would be amenable to embolization (for treatment of refractory encephalopathy)  - obtain pt's outpatient urology records and biopsy reports  - continue IV antibiotics per ID for Urinary tract infection  - PT evaluation and recommendations for discharge to rehab   - continue midodrine 20mg TID, continue to hold diuretics  - continue lactulose titrated to 3 BMs daily, Xifaxan BID  - EGD/colon report in the chart  - patient to be discussed at transplant evaluation committee as outpatient, followup with Dr. Medina in transplant clinic

## 2019-12-11 NOTE — PROGRESS NOTE ADULT - SUBJECTIVE AND OBJECTIVE BOX
Patient is a 64y old  Male who presents with a chief complaint of difficulty to ambulate/ weakness (11 Dec 2019 10:30)    Being followed by ID for UTI     Interval history:denies urinary complaints but unreliable historian   No acute events      ROS:  No cough,SOB,CP  No N/V/has loose stools no abd pain  No other complaints      Antimicrobials:    ertapenem  IVPB      ertapenem  IVPB 1000 milliGRAM(s) IV Intermittent every 24 hours  rifAXIMin 550 milliGRAM(s) Oral two times a day    Other medications reviewed  MEDICATIONS  (STANDING):  atorvastatin 10 milliGRAM(s) Oral at bedtime  dextrose 5%. 1000 milliLiter(s) (50 mL/Hr) IV Continuous <Continuous>  dextrose 50% Injectable 12.5 Gram(s) IV Push once  dextrose 50% Injectable 25 Gram(s) IV Push once  dextrose 50% Injectable 25 Gram(s) IV Push once  ertapenem  IVPB      ertapenem  IVPB 1000 milliGRAM(s) IV Intermittent every 24 hours  furosemide   Injectable 20 milliGRAM(s) IV Push once  insulin glargine Injectable (LANTUS) 20 Unit(s) SubCutaneous at bedtime  insulin lispro (HumaLOG) corrective regimen sliding scale   SubCutaneous three times a day before meals  insulin lispro (HumaLOG) corrective regimen sliding scale   SubCutaneous at bedtime  insulin lispro Injectable (HumaLOG) 4 Unit(s) SubCutaneous three times a day before meals  lactulose Syrup 30 Gram(s) Oral every 4 hours  midodrine. 20 milliGRAM(s) Oral three times a day  pantoprazole    Tablet 40 milliGRAM(s) Oral before breakfast  rifAXIMin 550 milliGRAM(s) Oral two times a day  sodium chloride 2 Gram(s) Oral three times a day  sucralfate 1 Gram(s) Oral two times a day  tamsulosin 0.4 milliGRAM(s) Oral at bedtime      Vital Signs Last 24 Hrs  T(C): 36.7 (12-11-19 @ 05:51), Max: 37.1 (12-10-19 @ 21:02)  T(F): 98 (12-11-19 @ 05:51), Max: 98.8 (12-10-19 @ 21:02)  HR: 82 (12-11-19 @ 05:51) (82 - 101)  BP: 131/64 (12-11-19 @ 05:51) (106/66 - 135/69)  BP(mean): --  RR: 17 (12-11-19 @ 05:51) (17 - 18)  SpO2: 99% (12-11-19 @ 05:51) (96% - 99%)    Physical Exam:    HEENT PERRLA    No oral exudate or erythema    Chest Good AE,CTA    CVS RRR S1 S2 WNl No murmur or rub or gallop    Abd soft BS normal No tenderness no masses    IV site no erythema tenderness or discharge  + FF  no sxcrotal or epididmal tenderness  No bob    LE stasis       CNS confused   Lab Data:                          7.7    3.93  )-----------( 40       ( 11 Dec 2019 08:43 )             22.1       12-11    131<L>  |  91<L>  |  40<H>  ----------------------------<  270<H>  3.1<L>   |  24  |  1.54<H>    Ca    11.1<H>      11 Dec 2019 07:05    TPro  6.3  /  Alb  4.1  /  TBili  8.5<H>  /  DBili  x   /  AST  30  /  ALT  19  /  AlkPhos  107  12-11      Urinalysis (12.10.19 @ 22:55)    pH Urine: 6.0    Glucose Qualitative, Urine: Negative    Blood, Urine: Large    Color: Dark Red    Urine Appearance: Turbid    Bilirubin: Small    Ketone - Urine: Trace    Specific Gravity: 1.025    Protein, Urine: 100 mg/dL    Urobilinogen: <2 mg/dL    Nitrite: Negative    Leukocyte Esterase Concentration: Moderate    Urine Microscopic-Add On (NC) (12.10.19 @ 22:55)    Hyaline Casts: 0 /LPF    Bacteria: Few    Epithelial Cells: 0 /HPF    Red Blood Cell - Urine: >720 /HPF    White Blood Cell - Urine: >720 /HPF                Culture - Urine (12.06.19 @ 10:30)    -  Ampicillin: R >16 These ampicillin results predict results for amoxicillin    -  Ampicillin: R >16 These ampicillin results predict results for amoxicillin    -  Ampicillin/Sulbactam: R 16/8 Enterobacter, Citrobacter, and Serratia may develop resistance during prolonged therapy (3-4 days)    -  Ampicillin/Sulbactam: S <=8/4 Enterobacter, Citrobacter, and Serratia may develop resistance during prolonged therapy (3-4 days)    -  Cefazolin: R >16 (MIC_CL_COM_ENTERIC_CEFAZU) For uncomplicated UTI with K. pneumoniae, E. coli, or P. mirablis: LEONARDO <=16 is sensitive and LEONARDO >=32 is resistant. This also predicts results for oral agents cefaclor, cefdinir, cefpodoxime, cefprozil, cefuroxime axetil, cephalexin and locarbef for uncomplicated UTI. Note that some isolates may be susceptible to these agents while testing resistant to cefazolin.    -  Cefazolin: S <=8 (MIC_CL_COM_ENTERIC_CEFAZU) For uncomplicated UTI with K. pneumoniae, E. coli, or P. mirablis: LEONARDO <=16 is sensitive and LEONARDO >=32 is resistant. This also predicts results for oral agents cefaclor, cefdinir, cefpodoxime, cefprozil, cefuroxime axetil, cephalexin and locarbef for uncomplicated UTI. Note that some isolates may be susceptible to these agents while testing resistant to cefazolin.    -  Amikacin: S <=16    -  Amikacin: S <=16    -  Aztreonam: R >16    -  Aztreonam: S <=4    -  Trimethoprim/Sulfamethoxazole: S <=2/38    -  Trimethoprim/Sulfamethoxazole: S <=2/38    -  Tobramycin: S <=4    -  Tobramycin: S <=4    -  Piperacillin/Tazobactam: R <=16    -  Piperacillin/Tazobactam: S <=16    -  Tigecycline: S <=2    -  Tigecycline: S <=2    -  Meropenem: S <=1    -  Meropenem: S <=1    -  Nitrofurantoin: S <=32 Should not be used to treat pyelonephritis    -  Nitrofurantoin: I 64 Should not be used to treat pyelonephritis    -  Imipenem: S <=1    -  Imipenem: S <=1    -  Levofloxacin: R >4    -  Levofloxacin: S <=2    -  Ertapenem: S <=1    -  Gentamicin: S <=4    -  Gentamicin: S <=4    -  Ciprofloxacin: R >2    -  Ciprofloxacin: S <=1    -  Ceftriaxone: R >32 Enterobacter, Citrobacter, and Serratia may develop resistance during prolonged therapy    -  Ceftriaxone: S <=1 Enterobacter, Citrobacter, and Serratia may develop resistance during prolonged therapy    -  Cefoxitin: S <=8    -  Cefoxitin: S <=8    -  Cefepime: R >16    -  Cefepime: S <=4    Specimen Source: .Urine Clean Catch (Midstream)    Culture Results:   >100,000 CFU/ml Escherichia coli ESBL  >100,000 CFU/ml Klebsiella pneumoniae    Organism Identification: Gram Negative Rods  Gram Negative Rods    Organism: Gram Negative Rods    Organism: Gram Negative Rods    Method Type: LEONARDO    Method Type: LEONARDO

## 2019-12-12 LAB
ALBUMIN SERPL ELPH-MCNC: 3.8 G/DL — SIGNIFICANT CHANGE UP (ref 3.3–5)
ALP SERPL-CCNC: 121 U/L — HIGH (ref 40–120)
ALT FLD-CCNC: 19 U/L — SIGNIFICANT CHANGE UP (ref 10–45)
ANION GAP SERPL CALC-SCNC: 14 MMOL/L — SIGNIFICANT CHANGE UP (ref 5–17)
ANION GAP SERPL CALC-SCNC: 9 MMOL/L — SIGNIFICANT CHANGE UP (ref 5–17)
AST SERPL-CCNC: 27 U/L — SIGNIFICANT CHANGE UP (ref 10–40)
BASOPHILS # BLD AUTO: 0.04 K/UL — SIGNIFICANT CHANGE UP (ref 0–0.2)
BASOPHILS NFR BLD AUTO: 1 % — SIGNIFICANT CHANGE UP (ref 0–2)
BILIRUB SERPL-MCNC: 8.9 MG/DL — HIGH (ref 0.2–1.2)
BUN SERPL-MCNC: 32 MG/DL — HIGH (ref 7–23)
BUN SERPL-MCNC: 34 MG/DL — HIGH (ref 7–23)
CA-I BLD-SCNC: 1.42 MMOL/L — HIGH (ref 1.12–1.3)
CALCIUM SERPL-MCNC: 11.1 MG/DL — HIGH (ref 8.4–10.5)
CALCIUM SERPL-MCNC: 11.3 MG/DL — HIGH (ref 8.4–10.5)
CHLORIDE SERPL-SCNC: 101 MMOL/L — SIGNIFICANT CHANGE UP (ref 96–108)
CHLORIDE SERPL-SCNC: 94 MMOL/L — LOW (ref 96–108)
CO2 SERPL-SCNC: 25 MMOL/L — SIGNIFICANT CHANGE UP (ref 22–31)
CO2 SERPL-SCNC: 26 MMOL/L — SIGNIFICANT CHANGE UP (ref 22–31)
CREAT SERPL-MCNC: 1.42 MG/DL — HIGH (ref 0.5–1.3)
CREAT SERPL-MCNC: 1.48 MG/DL — HIGH (ref 0.5–1.3)
EOSINOPHIL # BLD AUTO: 0.19 K/UL — SIGNIFICANT CHANGE UP (ref 0–0.5)
EOSINOPHIL NFR BLD AUTO: 4.8 % — SIGNIFICANT CHANGE UP (ref 0–6)
GLUCOSE BLDC GLUCOMTR-MCNC: 164 MG/DL — HIGH (ref 70–99)
GLUCOSE BLDC GLUCOMTR-MCNC: 239 MG/DL — HIGH (ref 70–99)
GLUCOSE BLDC GLUCOMTR-MCNC: 246 MG/DL — HIGH (ref 70–99)
GLUCOSE BLDC GLUCOMTR-MCNC: 278 MG/DL — HIGH (ref 70–99)
GLUCOSE BLDC GLUCOMTR-MCNC: 296 MG/DL — HIGH (ref 70–99)
GLUCOSE BLDC GLUCOMTR-MCNC: 297 MG/DL — HIGH (ref 70–99)
GLUCOSE SERPL-MCNC: 173 MG/DL — HIGH (ref 70–99)
GLUCOSE SERPL-MCNC: 321 MG/DL — HIGH (ref 70–99)
HCT VFR BLD CALC: 24 % — LOW (ref 39–50)
HGB BLD-MCNC: 8.2 G/DL — LOW (ref 13–17)
IMM GRANULOCYTES NFR BLD AUTO: 0.3 % — SIGNIFICANT CHANGE UP (ref 0–1.5)
LYMPHOCYTES # BLD AUTO: 1.08 K/UL — SIGNIFICANT CHANGE UP (ref 1–3.3)
LYMPHOCYTES # BLD AUTO: 27.1 % — SIGNIFICANT CHANGE UP (ref 13–44)
MCHC RBC-ENTMCNC: 32.8 PG — SIGNIFICANT CHANGE UP (ref 27–34)
MCHC RBC-ENTMCNC: 34.2 GM/DL — SIGNIFICANT CHANGE UP (ref 32–36)
MCV RBC AUTO: 96 FL — SIGNIFICANT CHANGE UP (ref 80–100)
MONOCYTES # BLD AUTO: 0.62 K/UL — SIGNIFICANT CHANGE UP (ref 0–0.9)
MONOCYTES NFR BLD AUTO: 15.5 % — HIGH (ref 2–14)
NEUTROPHILS # BLD AUTO: 2.05 K/UL — SIGNIFICANT CHANGE UP (ref 1.8–7.4)
NEUTROPHILS NFR BLD AUTO: 51.3 % — SIGNIFICANT CHANGE UP (ref 43–77)
PHOSPHATE SERPL-MCNC: 2.3 MG/DL — LOW (ref 2.5–4.5)
PLATELET # BLD AUTO: 39 K/UL — LOW (ref 150–400)
POTASSIUM SERPL-MCNC: 3.7 MMOL/L — SIGNIFICANT CHANGE UP (ref 3.5–5.3)
POTASSIUM SERPL-MCNC: 3.8 MMOL/L — SIGNIFICANT CHANGE UP (ref 3.5–5.3)
POTASSIUM SERPL-SCNC: 3.7 MMOL/L — SIGNIFICANT CHANGE UP (ref 3.5–5.3)
POTASSIUM SERPL-SCNC: 3.8 MMOL/L — SIGNIFICANT CHANGE UP (ref 3.5–5.3)
PROT SERPL-MCNC: 6.4 G/DL — SIGNIFICANT CHANGE UP (ref 6–8.3)
RBC # BLD: 2.5 M/UL — LOW (ref 4.2–5.8)
RBC # FLD: 18.4 % — HIGH (ref 10.3–14.5)
SODIUM SERPL-SCNC: 133 MMOL/L — LOW (ref 135–145)
SODIUM SERPL-SCNC: 136 MMOL/L — SIGNIFICANT CHANGE UP (ref 135–145)
WBC # BLD: 3.99 K/UL — SIGNIFICANT CHANGE UP (ref 3.8–10.5)
WBC # FLD AUTO: 3.99 K/UL — SIGNIFICANT CHANGE UP (ref 3.8–10.5)

## 2019-12-12 PROCEDURE — 99232 SBSQ HOSP IP/OBS MODERATE 35: CPT

## 2019-12-12 RX ORDER — POTASSIUM CHLORIDE 20 MEQ
20 PACKET (EA) ORAL ONCE
Refills: 0 | Status: COMPLETED | OUTPATIENT
Start: 2019-12-12 | End: 2019-12-12

## 2019-12-12 RX ORDER — HUMAN INSULIN 100 [IU]/ML
14 INJECTION, SUSPENSION SUBCUTANEOUS ONCE
Refills: 0 | Status: COMPLETED | OUTPATIENT
Start: 2019-12-12 | End: 2019-12-12

## 2019-12-12 RX ORDER — DEXTROSE 50 % IN WATER 50 %
12.5 SYRINGE (ML) INTRAVENOUS ONCE
Refills: 0 | Status: COMPLETED | OUTPATIENT
Start: 2019-12-12 | End: 2019-12-12

## 2019-12-12 RX ORDER — INSULIN GLARGINE 100 [IU]/ML
28 INJECTION, SOLUTION SUBCUTANEOUS AT BEDTIME
Refills: 0 | Status: DISCONTINUED | OUTPATIENT
Start: 2019-12-12 | End: 2019-12-17

## 2019-12-12 RX ORDER — INSULIN LISPRO 100/ML
8 VIAL (ML) SUBCUTANEOUS
Refills: 0 | Status: DISCONTINUED | OUTPATIENT
Start: 2019-12-12 | End: 2019-12-16

## 2019-12-12 RX ADMIN — LACTULOSE 30 GRAM(S): 10 SOLUTION ORAL at 02:09

## 2019-12-12 RX ADMIN — Medication 1 GRAM(S): at 06:49

## 2019-12-12 RX ADMIN — Medication 2: at 13:30

## 2019-12-12 RX ADMIN — Medication 6 UNIT(S): at 09:46

## 2019-12-12 RX ADMIN — Medication 2: at 16:18

## 2019-12-12 RX ADMIN — ERTAPENEM SODIUM 120 MILLIGRAM(S): 1 INJECTION, POWDER, LYOPHILIZED, FOR SOLUTION INTRAMUSCULAR; INTRAVENOUS at 16:22

## 2019-12-12 RX ADMIN — SODIUM CHLORIDE 2 GRAM(S): 9 INJECTION INTRAMUSCULAR; INTRAVENOUS; SUBCUTANEOUS at 13:31

## 2019-12-12 RX ADMIN — Medication 8 UNIT(S): at 13:30

## 2019-12-12 RX ADMIN — MIDODRINE HYDROCHLORIDE 20 MILLIGRAM(S): 2.5 TABLET ORAL at 16:19

## 2019-12-12 RX ADMIN — Medication 1 GRAM(S): at 16:20

## 2019-12-12 RX ADMIN — Medication 20 MILLIEQUIVALENT(S): at 02:52

## 2019-12-12 RX ADMIN — MIDODRINE HYDROCHLORIDE 20 MILLIGRAM(S): 2.5 TABLET ORAL at 13:31

## 2019-12-12 RX ADMIN — MIDODRINE HYDROCHLORIDE 20 MILLIGRAM(S): 2.5 TABLET ORAL at 06:48

## 2019-12-12 RX ADMIN — Medication 8 UNIT(S): at 16:18

## 2019-12-12 RX ADMIN — ATORVASTATIN CALCIUM 10 MILLIGRAM(S): 80 TABLET, FILM COATED ORAL at 22:14

## 2019-12-12 RX ADMIN — HUMAN INSULIN 14 UNIT(S): 100 INJECTION, SUSPENSION SUBCUTANEOUS at 16:18

## 2019-12-12 RX ADMIN — INSULIN GLARGINE 28 UNIT(S): 100 INJECTION, SOLUTION SUBCUTANEOUS at 22:14

## 2019-12-12 RX ADMIN — SODIUM CHLORIDE 2 GRAM(S): 9 INJECTION INTRAMUSCULAR; INTRAVENOUS; SUBCUTANEOUS at 06:48

## 2019-12-12 RX ADMIN — TAMSULOSIN HYDROCHLORIDE 0.4 MILLIGRAM(S): 0.4 CAPSULE ORAL at 22:14

## 2019-12-12 RX ADMIN — LACTULOSE 30 GRAM(S): 10 SOLUTION ORAL at 16:19

## 2019-12-12 RX ADMIN — LACTULOSE 30 GRAM(S): 10 SOLUTION ORAL at 06:47

## 2019-12-12 RX ADMIN — LACTULOSE 30 GRAM(S): 10 SOLUTION ORAL at 13:31

## 2019-12-12 RX ADMIN — LACTULOSE 30 GRAM(S): 10 SOLUTION ORAL at 22:14

## 2019-12-12 RX ADMIN — PANTOPRAZOLE SODIUM 40 MILLIGRAM(S): 20 TABLET, DELAYED RELEASE ORAL at 06:49

## 2019-12-12 RX ADMIN — LACTULOSE 30 GRAM(S): 10 SOLUTION ORAL at 09:47

## 2019-12-12 RX ADMIN — Medication 3: at 09:47

## 2019-12-12 NOTE — PROGRESS NOTE ADULT - ASSESSMENT
· Assessment		  1. Chronic Normocytic Anemia    -- H/H had been stable but lower today, no obvious bleeding  -- Undetectable Hapto sec to Liver Disease, remainder of hemolytic labs neg  -- Iron Studies c/w Anemia of Chronic Inflammation  -- No B12/Folate Deficiency  -- FOBT pos in the past  -- monitor Counts  --s/p transfusion of 1/2 u PRBC with adequate response     2. IgG Kappa MGUS(Low Risk)    -- M spike only 200mg/dL  -- likely MGUS  -- bence jonce protein not present  -- monitor clinically for now, repeat levels in 3-4 months    3. cirrhosis   -- per GI and hepatology       4. thrombocytopenia sec to cirrhosis     -- platelet count stable  -- trend  --transfuse for < 10,000 or <50,000 for procedures or evidence of bleeding    Thank you for the courtesy of this consultation and we will continue to follow.    Juan Carlos Rudd MD  [facility]  Cell: [phone]

## 2019-12-12 NOTE — PROGRESS NOTE ADULT - SUBJECTIVE AND OBJECTIVE BOX
INTERVAL HPI/OVERNIGHT EVENTS:    more confused  + having bms     MEDICATIONS  (STANDING):  artificial  tears Solution 1 Drop(s) Both EYES three times a day  atorvastatin 10 milliGRAM(s) Oral at bedtime  dextrose 5%. 1000 milliLiter(s) (50 mL/Hr) IV Continuous <Continuous>  dextrose 50% Injectable 12.5 Gram(s) IV Push once  dextrose 50% Injectable 25 Gram(s) IV Push once  dextrose 50% Injectable 25 Gram(s) IV Push once  ertapenem  IVPB      ertapenem  IVPB 1000 milliGRAM(s) IV Intermittent every 24 hours  furosemide   Injectable 20 milliGRAM(s) IV Push once  insulin glargine Injectable (LANTUS) 28 Unit(s) SubCutaneous at bedtime  insulin lispro (HumaLOG) corrective regimen sliding scale   SubCutaneous three times a day before meals  insulin lispro (HumaLOG) corrective regimen sliding scale   SubCutaneous at bedtime  insulin lispro Injectable (HumaLOG) 8 Unit(s) SubCutaneous three times a day before meals  lactulose Syrup 30 Gram(s) Oral every 4 hours  midodrine. 20 milliGRAM(s) Oral three times a day  pantoprazole    Tablet 40 milliGRAM(s) Oral before breakfast  rifAXIMin 550 milliGRAM(s) Oral two times a day  sucralfate 1 Gram(s) Oral two times a day  tamsulosin 0.4 milliGRAM(s) Oral at bedtime    MEDICATIONS  (PRN):  dextrose 40% Gel 15 Gram(s) Oral once PRN Blood Glucose LESS THAN 70 milliGRAM(s)/deciliter  glucagon  Injectable 1 milliGRAM(s) IntraMuscular once PRN Glucose LESS THAN 70 milligrams/deciliter      Allergies    codeine (Anaphylaxis)    Intolerances    NO  RED MEAT (Unknown)      Review of Systems:    General:  No wt loss, fevers, chills, night sweats,fatigue,   Eyes:  Good vision, no reported pain  ENT:  No sore throat, pain, runny nose, dysphagia  CV:  No pain, palpitations, hypo/hypertension  Resp:  No dyspnea, cough, tachypnea, wheezing  GI:  No pain, No nausea, No vomiting, No diarrhea, No constipation, No weight loss, No fever, No pruritis, No rectal bleeding, No melena, No dysphagia  :  No pain, bleeding, incontinence, nocturia  Muscle:  No pain, weakness  Neuro:  No weakness, tingling, memory problems  Psych:  No fatigue, insomnia, mood problems, depression  Endocrine:  No polyuria, polydypsia, cold/heat intolerance  Heme:  No petechiae, ecchymosis, easy bruisability  Skin:  No rash, tattoos, scars, edema      Vital Signs Last 24 Hrs  T(C): 36.8 (13 Dec 2019 04:19), Max: 36.8 (13 Dec 2019 04:19)  T(F): 98.2 (13 Dec 2019 04:19), Max: 98.2 (13 Dec 2019 04:19)  HR: 69 (13 Dec 2019 04:19) (69 - 80)  BP: 116/62 (13 Dec 2019 04:19) (116/62 - 123/66)  BP(mean): --  RR: 18 (13 Dec 2019 04:19) (18 - 18)  SpO2: 97% (13 Dec 2019 04:19) (95% - 97%)    PHYSICAL EXAM:    Constitutional: NAD, well-developed  HEENT: EOMI, throat clear  Neck: No LAD, supple  Respiratory: CTA and P  Cardiovascular: S1 and S2, RRR, no M  Gastrointestinal: BS+, soft, NT/ND, neg HSM,  Extremities: No peripheral edema, neg clubing, cyanosis  Vascular: 2+ peripheral pulses  Neurological: A/O x 3, no focal deficits  Psychiatric: Normal mood, normal affect  Skin: No rashes      LABS:                        7.7    3.99  )-----------( 39       ( 13 Dec 2019 08:28 )             22.8     12-13    134<L>  |  97  |  30<H>  ----------------------------<  136<H>  3.7   |  27  |  1.27    Ca    10.6<H>      13 Dec 2019 08:30  Phos  2.3     12-12    TPro  5.9<L>  /  Alb  3.7  /  TBili  8.8<H>  /  DBili  x   /  AST  31  /  ALT  18  /  AlkPhos  104  12-13    PT/INR - ( 13 Dec 2019 08:30 )   PT: 26.3 sec;   INR: 2.23 ratio         PTT - ( 13 Dec 2019 08:30 )  PTT:52.5 sec      RADIOLOGY & ADDITIONAL TESTS:

## 2019-12-12 NOTE — PROGRESS NOTE ADULT - ATTENDING COMMENTS
Hepatology Staff: Yomi eMdina MD    I saw and examined the patient along with  Dr. Huang 12-12-19 @ 09:18.    Patient Medical Record, hospital course was reviewed and summarized as below:    Hemodynamic Status: stable  Vitals: Vital Signs Last 24 Hrs  T(C): 36.9 (12 Dec 2019 04:03), Max: 36.9 (11 Dec 2019 20:57)  T(F): 98.5 (12 Dec 2019 04:03), Max: 98.5 (11 Dec 2019 20:57)  HR: 77 (12 Dec 2019 04:03) (77 - 86)  BP: 124/67 (12 Dec 2019 04:03) (124/61 - 128/70)  RR: 18 (12 Dec 2019 04:03) (17 - 18)  SpO2: 99% (12 Dec 2019 04:03) (95% - 99%)    Antibiotics:ertapenem  IVPB 1000 milliGRAM(s) IV Intermittent every 24 hours    Diuretics: none  Labs:Creatinine, Serum: 1.42 mg/dL (12-12-19 @ 07:08)  Bilirubin Total, Serum: 8.9 mg/dL (12-12-19 @ 07:08)  Creatinine, Serum: 1.39 mg/dL (12-11-19 @ 22:38)    I/O: I&O's Summary    11 Dec 2019 07:01  -  12 Dec 2019 07:00  --------------------------------------------------------  IN: 260 mL / OUT: 1350 mL / NET: -1090 mL    Nutritional Status:   Albumin, Serum: 3.8 g/dL (12-12-19 @ 07:08)    Last 24 hour events: none.  Recommendations: Overall doing better, mental status at baseline (improved). Hypokalemia is corrected. MISA- serum Cr 1.4 ( stable from yesterday).  Keep on Iv Albumin 25% 25 g q 8hrs, Midorine, Octreotide. Antibiotics ( Ertapenem per ID).  Transplant candidacy will be reviewed tomorrow.      Plan discussed with Primary team.

## 2019-12-12 NOTE — PROGRESS NOTE ADULT - SUBJECTIVE AND OBJECTIVE BOX
SUBJECTIVE / OVERNIGHT EVENTS: pt denies headache, nausea,v   chest pain, shortness of breath ,     MEDICATIONS  (STANDING):  atorvastatin 10 milliGRAM(s) Oral at bedtime  dextrose 5%. 1000 milliLiter(s) (50 mL/Hr) IV Continuous <Continuous>  dextrose 50% Injectable 12.5 Gram(s) IV Push once  dextrose 50% Injectable 25 Gram(s) IV Push once  dextrose 50% Injectable 25 Gram(s) IV Push once  dextrose 50% Injectable 12.5 Gram(s) IV Push once  ertapenem  IVPB      ertapenem  IVPB 1000 milliGRAM(s) IV Intermittent every 24 hours  insulin glargine Injectable (LANTUS) 28 Unit(s) SubCutaneous at bedtime  insulin lispro (HumaLOG) corrective regimen sliding scale   SubCutaneous three times a day before meals  insulin lispro (HumaLOG) corrective regimen sliding scale   SubCutaneous at bedtime  insulin lispro Injectable (HumaLOG) 8 Unit(s) SubCutaneous three times a day before meals  lactulose Syrup 30 Gram(s) Oral every 4 hours  midodrine. 20 milliGRAM(s) Oral three times a day  pantoprazole    Tablet 40 milliGRAM(s) Oral before breakfast  rifAXIMin 550 milliGRAM(s) Oral two times a day  sucralfate 1 Gram(s) Oral two times a day  tamsulosin 0.4 milliGRAM(s) Oral at bedtime    MEDICATIONS  (PRN):  dextrose 40% Gel 15 Gram(s) Oral once PRN Blood Glucose LESS THAN 70 milliGRAM(s)/deciliter  glucagon  Injectable 1 milliGRAM(s) IntraMuscular once PRN Glucose LESS THAN 70 milligrams/deciliter    Vital Signs Last 24 Hrs  T(C): 36.7 (12 Dec 2019 21:03), Max: 36.9 (12 Dec 2019 04:03)  T(F): 98 (12 Dec 2019 21:03), Max: 98.5 (12 Dec 2019 04:03)  HR: 80 (12 Dec 2019 21:03) (77 - 86)  BP: 122/65 (12 Dec 2019 21:03) (99/62 - 124/67)  BP(mean): --  RR: 18 (12 Dec 2019 21:03) (18 - 18)  SpO2: 95% (12 Dec 2019 21:03) (95% - 99%)    Constitutional: No fever, fatigue  Skin: No rash.  Eyes: No recent vision problems or eye pain.  ENT: No congestion, ear pain, or sore throat.  Cardiovascular: No chest pain or palpation.  Respiratory: No cough, shortness of breath, congestion, or wheezing.  Gastrointestinal: No abdominal pain, nausea, vomiting, or diarrhea.  Genitourinary: No dysuria.  Musculoskeletal: No joint swelling.  Neurologic: No headache.    PHYSICAL EXAM:  GENERAL: NAD  EYES: EOMI, PERRLA  NECK: Supple, No JVD  CHEST/LUNG: dec breath sounds at bases   HEART:  S1 , S2 +  ABDOMEN: soft , bs+, mild distension + , no tenderness  EXTREMITIES:  no edema  NEUROLOGY:alert awake oriented   flap tremor+    LABS:      136  |  101  |  32<H>  ----------------------------<  173<H>  3.7   |  26  |  1.48<H>    Ca    11.1<H>      12 Dec 2019 20:28  Phos  2.3         TPro  6.4  /  Alb  3.8  /  TBili  8.9<H>  /  DBili      /  AST  27  /  ALT  19  /  AlkPhos  121<H>      Creatinine Trend: 1.48 <--, 1.42 <--, 1.39 <--, 1.54 <--, 1.86 <--, 1.80 <--, 1.81 <--, 1.75 <--, 1.76 <--, 1.80 <--, 1.88 <--, 1.64 <--                        8.2    3.99  )-----------( 39       ( 12 Dec 2019 08:16 )             24.0     Urine Studies:  Urinalysis Basic - ( 10 Dec 2019 22:55 )    Color: Dark Red / Appearance: Turbid / S.025 / pH:   Gluc:  / Ketone: Trace  / Bili: Small / Urobili: <2 mg/dL   Blood:  / Protein: 100 mg/dL / Nitrite: Negative   Leuk Esterase: Moderate / RBC: >720 /HPF / WBC >720 /HPF   Sq Epi:  / Non Sq Epi: 0 /HPF / Bacteria: Few      Sodium, Random Urine: <35 mmol/L ( @ 16:31)          LIVER FUNCTIONS - ( 12 Dec 2019 07:08 )  Alb: 3.8 g/dL / Pro: 6.4 g/dL / ALK PHOS: 121 U/L / ALT: 19 U/L / AST: 27 U/L / GGT: x           PT/INR - ( 11 Dec 2019 08:57 )   PT: 26.1 sec;   INR: 2.21 ratio             Color: Dark Red / Appearance: Turbid / S.025 / pH:   Gluc:  / Ketone: Trace  / Bili: Small / Urobili: <2 mg/dL   Blood:  / Protein: 100 mg/dL / Nitrite: Negative   Leuk Esterase: Moderate / RBC: >720 /HPF / WBC >720 /HPF   Sq Epi:  / Non Sq Epi: 0 /HPF / Bacteria: Few      Sodium, Random Urine: <35 mmol/L ( @ 16:31)  Creatinine, Random Urine: 118 mg/dL ( @ 03:54)  Protein/Creatinine Ratio Calculation: 0.9 Ratio ( @ 03:54)          LIVER FUNCTIONS - ( 11 Dec 2019 07:05 )  Alb: 4.1 g/dL / Pro: 6.3 g/dL / ALK PHOS: 107 U/L / ALT: 19 U/L / AST: 30 U/L / GGT: x           PT/INR - ( 11 Dec 2019 08:57 )   PT: 26.1 sec;   INR: 2.21 ratio           Sq Epi:  / Non Sq Epi: 0 /hpf / Bacteria: Many      Sodium, Random Urine: <35 mmol/L ( @ 16:31)  Creatinine, Random Urine: 118 mg/dL ( @ 03:54)  Protein/Creatinine Ratio Calculation: 0.9 Ratio ( @ 03:54)          LIVER FUNCTIONS - ( 09 Dec 2019 07:01 )  Alb: 3.8 g/dL / Pro: 6.4 g/dL / ALK PHOS: 124 U/L / ALT: 17 U/L / AST: 25 U/L / GGT: x           PT/INR - ( 09 Dec 2019 08:21 )   PT: 24.4 sec;   INR: 2.09 ratio

## 2019-12-12 NOTE — PROGRESS NOTE ADULT - SUBJECTIVE AND OBJECTIVE BOX
[facility] -- Sushila Dhaliwal, Morenita Camacho Pannella, Patel, Savella  Office # [phone]      Follow Up:  abn ekg    Subjective/Observations: Patient seen and examined. Events noted. Resting comfortably in bed. No complaints of chest pain, dyspnea, or palpitations reported. No signs of orthopnea or PND.       REVIEW OF SYSTEMS: All other review of systems is negative unless indicated above    PAST MEDICAL & SURGICAL HISTORY:  GIB (gastrointestinal bleeding)  GERD with esophagitis: Gastritis &amp; Non Bleeding Ulcers  Hepatic encephalopathy  Obesity  Fatty liver disease, nonalcoholic  Renal stones: 25 years ago  Hypertension  Neuropathy  Hypercholesteremia  Diabetes  S/P cholecystectomy      MEDICATIONS  (STANDING):  atorvastatin 10 milliGRAM(s) Oral at bedtime  dextrose 5%. 1000 milliLiter(s) (50 mL/Hr) IV Continuous <Continuous>  dextrose 50% Injectable 12.5 Gram(s) IV Push once  dextrose 50% Injectable 25 Gram(s) IV Push once  dextrose 50% Injectable 25 Gram(s) IV Push once  ertapenem  IVPB      ertapenem  IVPB 1000 milliGRAM(s) IV Intermittent every 24 hours  insulin lispro (HumaLOG) corrective regimen sliding scale   SubCutaneous three times a day before meals  insulin lispro (HumaLOG) corrective regimen sliding scale   SubCutaneous at bedtime  insulin lispro Injectable (HumaLOG) 6 Unit(s) SubCutaneous three times a day before meals  insulin NPH human recombinant 24 Unit(s) SubCutaneous at bedtime  lactulose Syrup 30 Gram(s) Oral every 4 hours  midodrine. 20 milliGRAM(s) Oral three times a day  pantoprazole    Tablet 40 milliGRAM(s) Oral before breakfast  rifAXIMin 550 milliGRAM(s) Oral two times a day  sodium chloride 2 Gram(s) Oral three times a day  sucralfate 1 Gram(s) Oral two times a day  tamsulosin 0.4 milliGRAM(s) Oral at bedtime    MEDICATIONS  (PRN):  dextrose 40% Gel 15 Gram(s) Oral once PRN Blood Glucose LESS THAN 70 milliGRAM(s)/deciliter  glucagon  Injectable 1 milliGRAM(s) IntraMuscular once PRN Glucose LESS THAN 70 milligrams/deciliter      Allergies    codeine (Anaphylaxis)    Intolerances    NO  RED MEAT (Unknown)          Vital Signs Last 24 Hrs  T(C): 36.9 (12 Dec 2019 04:03), Max: 36.9 (11 Dec 2019 20:57)  T(F): 98.5 (12 Dec 2019 04:03), Max: 98.5 (11 Dec 2019 20:57)  HR: 77 (12 Dec 2019 04:03) (77 - 86)  BP: 124/67 (12 Dec 2019 04:03) (124/61 - 128/70)  BP(mean): --  RR: 18 (12 Dec 2019 04:03) (17 - 18)  SpO2: 99% (12 Dec 2019 04:03) (95% - 99%)    I&O's Summary    11 Dec 2019 07:01  -  12 Dec 2019 07:00  --------------------------------------------------------  IN: 260 mL / OUT: 1350 mL / NET: -1090 mL          PHYSICAL EXAM:     Constitutional: NAD, awake   HEENT: Moist Mucous Membranes, Anicteric  Pulmonary: Decreased breath sounds b/l. No rales, crackles or wheeze appreciated.   Cardiovascular: Regular, S1 and S2, No murmurs, rubs, gallops or clicks  Gastrointestinal: Bowel Sounds present, soft, nontender.   Lymph: No peripheral edema. No lymphadenopathy.  Skin: No visible rashes or ulcers.  Psych:  Mood & affect appropriate for situation    LABS: All Labs Reviewed:                        8.2    3.99  )-----------( 39       ( 12 Dec 2019 08:16 )             24.0                         7.7    3.93  )-----------( 40       ( 11 Dec 2019 08:43 )             22.1                         9.1    4.80  )-----------( 39       ( 10 Dec 2019 09:09 )             26.4     12 Dec 2019 07:08    133    |  94     |  34     ----------------------------<  321    3.8     |  25     |  1.42   11 Dec 2019 22:38    133    |  95     |  35     ----------------------------<  286    3.4     |  25     |  1.39   11 Dec 2019 07:05    131    |  91     |  40     ----------------------------<  270    3.1     |  24     |  1.54     Ca    11.3       12 Dec 2019 07:08  Ca    10.8       11 Dec 2019 22:38  Ca    11.1       11 Dec 2019 07:05  Phos  2.3       12 Dec 2019 07:08    TPro  6.4    /  Alb  3.8    /  TBili  8.9    /  DBili  x      /  AST  27     /  ALT  19     /  AlkPhos  121    12 Dec 2019 07:08  TPro  6.3    /  Alb  4.1    /  TBili  8.5    /  DBili  x      /  AST  30     /  ALT  19     /  AlkPhos  107    11 Dec 2019 07:05    PT/INR - ( 11 Dec 2019 08:57 )   PT: 26.1 sec;   INR: 2.21 ratio

## 2019-12-12 NOTE — PROGRESS NOTE ADULT - ASSESSMENT
63M w/ pmh HTN, HLD, DM, non-alcoholic cirrhosis, chronic thrombocytopenia -- sent from rehab for worsening jaundice, dizzy/weakness/falls x 4 days.  h/o strep bacteremia 10/19 s/p RX  Was being observed off abx  12/6 urine Cx(unclear whys ent) with E coli and kleb-ESBL  Patient does not provide reliable ROS  No bob  No other systemic symptoms though ? confused/encephalopathic  ? colonization/contamination vs UTI  repeat urine Cx with >[id number] CFU GNRs  Rec:  1)follow repeat  urine Cx ID sensi  2) monitor clinically  3) continue empiric Invanz 1 gm IV q 24  Adjust dosage per renal function   Will tailor plan for ID issues  per course,results.Will defer to primary team on management of other issues.  case d/w Med NP    Will Follow.  Beeper [id number]-boga/afterhours/No response-[id number]

## 2019-12-12 NOTE — PROGRESS NOTE ADULT - SUBJECTIVE AND OBJECTIVE BOX
Patient is a 64y old  Male who presents with a chief complaint of difficulty to ambulate/ weakness (12 Dec 2019 09:18)    Being followed by ID for UTI    Interval history:letahrgic but answers queries  denies any abd pain  No urinary complaints   No acute events      ROS:  No cough,SOB,CP  No N/V/D./abd pain  No other complaints      Antimicrobials:    ertapenem  IVPB      ertapenem  IVPB 1000 milliGRAM(s) IV Intermittent every 24 hours  rifAXIMin 550 milliGRAM(s) Oral two times a day    Other medications reviewed    Vital Signs Last 24 Hrs  T(C): 36.9 (12-12-19 @ 04:03), Max: 36.9 (12-11-19 @ 20:57)  T(F): 98.5 (12-12-19 @ 04:03), Max: 98.5 (12-11-19 @ 20:57)  HR: 86 (12-12-19 @ 11:25) (77 - 86)  BP: 99/62 (12-12-19 @ 11:25) (99/62 - 128/70)  BP(mean): --  RR: 18 (12-12-19 @ 04:03) (17 - 18)  SpO2: 97% (12-12-19 @ 11:25) (95% - 99%)    Physical Exam:        HEENT PERRLA    No oral exudate or erythema    Chest Good AE,CTA    CVS RRR S1 S2 WNl No murmur or rub or gallop    Abd soft BS normal No tenderness no masses    IV site no erythema tenderness or discharge  + FF    No bob    LE stasis       CNS confused as above     Lab Data:                          8.2    3.99  )-----------( 39       ( 12 Dec 2019 08:16 )             24.0       12-12    133<L>  |  94<L>  |  34<H>  ----------------------------<  321<H>  3.8   |  25  |  1.42<H>    Ca    11.3<H>      12 Dec 2019 07:08  Phos  2.3     12-12    TPro  6.4  /  Alb  3.8  /  TBili  8.9<H>  /  DBili  x   /  AST  27  /  ALT  19  /  AlkPhos  121<H>  12-12        Culture - Urine (collected 10 Dec 2019 23:06)  Source: .Urine Clean Catch (Midstream)  Preliminary Report (12 Dec 2019 08:19):    >100,000 CFU/ml Gram Negative Rods

## 2019-12-12 NOTE — PROGRESS NOTE ADULT - SUBJECTIVE AND OBJECTIVE BOX
Chief Complaint:  Patient is a 64y old  Male who presents with a chief complaint of difficulty to ambulate/ weakness (11 Dec 2019 17:25)      Interval Events:     Allergies:  codeine (Anaphylaxis)  NO  RED MEAT (Unknown)      Hospital Medications:  atorvastatin 10 milliGRAM(s) Oral at bedtime  dextrose 40% Gel 15 Gram(s) Oral once PRN  dextrose 5%. 1000 milliLiter(s) IV Continuous <Continuous>  dextrose 50% Injectable 12.5 Gram(s) IV Push once  dextrose 50% Injectable 25 Gram(s) IV Push once  dextrose 50% Injectable 25 Gram(s) IV Push once  ertapenem  IVPB      ertapenem  IVPB 1000 milliGRAM(s) IV Intermittent every 24 hours  glucagon  Injectable 1 milliGRAM(s) IntraMuscular once PRN  insulin lispro (HumaLOG) corrective regimen sliding scale   SubCutaneous three times a day before meals  insulin lispro (HumaLOG) corrective regimen sliding scale   SubCutaneous at bedtime  insulin lispro Injectable (HumaLOG) 6 Unit(s) SubCutaneous three times a day before meals  insulin NPH human recombinant 24 Unit(s) SubCutaneous at bedtime  lactulose Syrup 30 Gram(s) Oral every 4 hours  midodrine. 20 milliGRAM(s) Oral three times a day  pantoprazole    Tablet 40 milliGRAM(s) Oral before breakfast  rifAXIMin 550 milliGRAM(s) Oral two times a day  sodium chloride 2 Gram(s) Oral three times a day  sucralfate 1 Gram(s) Oral two times a day  tamsulosin 0.4 milliGRAM(s) Oral at bedtime      PMHX/PSHX:  GIB (gastrointestinal bleeding)  GERD with esophagitis  Hepatic encephalopathy  Obesity  Fatty liver disease, nonalcoholic  Renal stones  Hypertension  Neuropathy  Hypercholesteremia  Diabetes  S/P cholecystectomy  No significant past surgical history      Family history:  Family history of type 2 diabetes mellitus  Family history of hypertension  Family history of stomach cancer  No pertinent family history in first degree relatives      ROS:     General:  No wt loss, fevers, chills, night sweats, fatigue,   Eyes:  Good vision, no reported pain  ENT:  No sore throat, pain, runny nose, dysphagia  CV:  No pain, palpitations, hypo/hypertension  Resp:  No dyspnea, cough, tachypnea, wheezing  GI:  See HPI  :  No pain, bleeding, incontinence, nocturia  Muscle:  No pain, weakness  Neuro:  No weakness, tingling, memory problems  Psych:  No fatigue, insomnia, mood problems, depression  Endocrine:  No polyuria, polydipsia, cold/heat intolerance  Heme:  No petechiae, ecchymosis, easy bruisability  Skin:  No rash, edema      PHYSICAL EXAM:     GENERAL:  Appears stated age, well-groomed, well-nourished, no distress  HEENT:  NC/AT,  conjunctivae clear, sclera -anicteric  CHEST:  Full & symmetric excursion, no increased effort, breath sounds clear  HEART:  Regular rhythm, S1, S2, no murmur/rub/S3/S4,  no edema  ABDOMEN:  Soft, non-tender, non-distended, normoactive bowel sounds,  no masses ,no hepato-splenomegaly,   EXTREMITIES:  no cyanosis,clubbing or edema  SKIN:  No rash/erythema/ecchymoses/petechiae/wounds/abscess/warm/dry  NEURO:  Alert, oriented    Vital Signs:  Vital Signs Last 24 Hrs  T(C): 36.9 (12 Dec 2019 04:03), Max: 36.9 (11 Dec 2019 20:57)  T(F): 98.5 (12 Dec 2019 04:03), Max: 98.5 (11 Dec 2019 20:57)  HR: 77 (12 Dec 2019 04:03) (77 - 86)  BP: 124/67 (12 Dec 2019 04:03) (124/61 - 128/70)  BP(mean): --  RR: 18 (12 Dec 2019 04:03) (17 - 18)  SpO2: 99% (12 Dec 2019 04:03) (95% - 99%)  Daily     Daily     LABS:                        7.7    3.93  )-----------( 40       ( 11 Dec 2019 08:43 )             22.1         133<L>  |  95<L>  |  35<H>  ----------------------------<  286<H>  3.4<L>   |  25  |  1.39<H>    Ca    10.8<H>      11 Dec 2019 22:38    TPro  6.3  /  Alb  4.1  /  TBili  8.5<H>  /  DBili  x   /  AST  30  /  ALT  19  /  AlkPhos  107  1211    LIVER FUNCTIONS - ( 11 Dec 2019 07:05 )  Alb: 4.1 g/dL / Pro: 6.3 g/dL / ALK PHOS: 107 U/L / ALT: 19 U/L / AST: 30 U/L / GGT: x           PT/INR - ( 11 Dec 2019 08:57 )   PT: 26.1 sec;   INR: 2.21 ratio           Urinalysis Basic - ( 10 Dec 2019 22:55 )    Color: Dark Red / Appearance: Turbid / S.025 / pH: x  Gluc: x / Ketone: Trace  / Bili: Small / Urobili: <2 mg/dL   Blood: x / Protein: 100 mg/dL / Nitrite: Negative   Leuk Esterase: Moderate / RBC: >720 /HPF / WBC >720 /HPF   Sq Epi: x / Non Sq Epi: 0 /HPF / Bacteria: Few          Imaging: Chief Complaint:  Patient is a 64y old  Male who presents with a chief complaint of difficulty to ambulate/ weakness (11 Dec 2019 17:25)      Interval Events: Pt is more encephalopathic this morning.     Allergies:  codeine (Anaphylaxis)  NO  RED MEAT (Unknown)      Hospital Medications:  atorvastatin 10 milliGRAM(s) Oral at bedtime  dextrose 40% Gel 15 Gram(s) Oral once PRN  dextrose 5%. 1000 milliLiter(s) IV Continuous <Continuous>  dextrose 50% Injectable 12.5 Gram(s) IV Push once  dextrose 50% Injectable 25 Gram(s) IV Push once  dextrose 50% Injectable 25 Gram(s) IV Push once  ertapenem  IVPB      ertapenem  IVPB 1000 milliGRAM(s) IV Intermittent every 24 hours  glucagon  Injectable 1 milliGRAM(s) IntraMuscular once PRN  insulin lispro (HumaLOG) corrective regimen sliding scale   SubCutaneous three times a day before meals  insulin lispro (HumaLOG) corrective regimen sliding scale   SubCutaneous at bedtime  insulin lispro Injectable (HumaLOG) 6 Unit(s) SubCutaneous three times a day before meals  insulin NPH human recombinant 24 Unit(s) SubCutaneous at bedtime  lactulose Syrup 30 Gram(s) Oral every 4 hours  midodrine. 20 milliGRAM(s) Oral three times a day  pantoprazole    Tablet 40 milliGRAM(s) Oral before breakfast  rifAXIMin 550 milliGRAM(s) Oral two times a day  sodium chloride 2 Gram(s) Oral three times a day  sucralfate 1 Gram(s) Oral two times a day  tamsulosin 0.4 milliGRAM(s) Oral at bedtime      PMHX/PSHX:  GIB (gastrointestinal bleeding)  GERD with esophagitis  Hepatic encephalopathy  Obesity  Fatty liver disease, nonalcoholic  Renal stones  Hypertension  Neuropathy  Hypercholesteremia  Diabetes  S/P cholecystectomy  No significant past surgical history      Family history:  Family history of type 2 diabetes mellitus  Family history of hypertension  Family history of stomach cancer  No pertinent family history in first degree relatives      ROS:     General:  No wt loss, fevers, chills, night sweats, fatigue,   Eyes:  Good vision, no reported pain  ENT:  No sore throat, pain, runny nose, dysphagia  CV:  No pain, palpitations, hypo/hypertension  Resp:  No dyspnea, cough, tachypnea, wheezing  GI:  See HPI  :  No pain, bleeding, incontinence, nocturia  Muscle:  No pain, weakness  Neuro:  No weakness, tingling, memory problems  Psych:  No fatigue, insomnia, mood problems, depression  Endocrine:  No polyuria, polydipsia, cold/heat intolerance  Heme:  No petechiae, ecchymosis, easy bruisability  Skin:  No rash, edema      PHYSICAL EXAM:     GENERAL: NAD  HEENT:  NC/AT  CHEST:  Full & symmetric excursion, no increased effort  ABDOMEN:  Soft, non-tender, non-distended, +BS  EXTREMITIES:  no edema  SKIN:  No rash  NEURO:  Alert    Vital Signs:  Vital Signs Last 24 Hrs  T(C): 36.9 (12 Dec 2019 04:03), Max: 36.9 (11 Dec 2019 20:57)  T(F): 98.5 (12 Dec 2019 04:03), Max: 98.5 (11 Dec 2019 20:57)  HR: 77 (12 Dec 2019 04:03) (77 - 86)  BP: 124/67 (12 Dec 2019 04:03) (124/61 - 128/70)  BP(mean): --  RR: 18 (12 Dec 2019 04:03) (17 - 18)  SpO2: 99% (12 Dec 2019 04:03) (95% - 99%)  Daily     Daily     LABS:                        7.7    3.93  )-----------( 40       ( 11 Dec 2019 08:43 )             22.1     12-    133<L>  |  95<L>  |  35<H>  ----------------------------<  286<H>  3.4<L>   |  25  |  1.39<H>    Ca    10.8<H>      11 Dec 2019 22:38    TPro  6.3  /  Alb  4.1  /  TBili  8.5<H>  /  DBili  x   /  AST  30  /  ALT  19  /  AlkPhos  107  12-11    LIVER FUNCTIONS - ( 11 Dec 2019 07:05 )  Alb: 4.1 g/dL / Pro: 6.3 g/dL / ALK PHOS: 107 U/L / ALT: 19 U/L / AST: 30 U/L / GGT: x           PT/INR - ( 11 Dec 2019 08:57 )   PT: 26.1 sec;   INR: 2.21 ratio           Urinalysis Basic - ( 10 Dec 2019 22:55 )    Color: Dark Red / Appearance: Turbid / S.025 / pH: x  Gluc: x / Ketone: Trace  / Bili: Small / Urobili: <2 mg/dL   Blood: x / Protein: 100 mg/dL / Nitrite: Negative   Leuk Esterase: Moderate / RBC: >720 /HPF / WBC >720 /HPF   Sq Epi: x / Non Sq Epi: 0 /HPF / Bacteria: Few          Imaging:    reviewed

## 2019-12-12 NOTE — PROGRESS NOTE ADULT - SUBJECTIVE AND OBJECTIVE BOX
Pt seen/examined and comfortable    MEDICATIONS  (STANDING):  atorvastatin 10 milliGRAM(s) Oral at bedtime  dextrose 5%. 1000 milliLiter(s) (50 mL/Hr) IV Continuous <Continuous>  dextrose 50% Injectable 12.5 Gram(s) IV Push once  dextrose 50% Injectable 25 Gram(s) IV Push once  dextrose 50% Injectable 25 Gram(s) IV Push once  ertapenem  IVPB      ertapenem  IVPB 1000 milliGRAM(s) IV Intermittent every 24 hours  insulin glargine Injectable (LANTUS) 28 Unit(s) SubCutaneous at bedtime  insulin lispro (HumaLOG) corrective regimen sliding scale   SubCutaneous three times a day before meals  insulin lispro (HumaLOG) corrective regimen sliding scale   SubCutaneous at bedtime  insulin lispro Injectable (HumaLOG) 8 Unit(s) SubCutaneous three times a day before meals  insulin NPH human recombinant 14 Unit(s) SubCutaneous once  lactulose Syrup 30 Gram(s) Oral every 4 hours  midodrine. 20 milliGRAM(s) Oral three times a day  pantoprazole    Tablet 40 milliGRAM(s) Oral before breakfast  rifAXIMin 550 milliGRAM(s) Oral two times a day  sucralfate 1 Gram(s) Oral two times a day  tamsulosin 0.4 milliGRAM(s) Oral at bedtime    MEDICATIONS  (PRN):  dextrose 40% Gel 15 Gram(s) Oral once PRN Blood Glucose LESS THAN 70 milliGRAM(s)/deciliter  glucagon  Injectable 1 milliGRAM(s) IntraMuscular once PRN Glucose LESS THAN 70 milligrams/deciliter      ROS  No fever, sweats, chills  No epistaxis, HA, sore throat  No CP, SOB, cough, sputum  No n/v/d, abd pain, melena, hematochezia  No edema  No rash  No anxiety  No back pain, joint pain  No bleeding, bruising  No dysuria, hematuria    Vital Signs Last 24 Hrs  T(C): 36.6 (12 Dec 2019 14:09), Max: 36.9 (11 Dec 2019 20:57)  T(F): 97.9 (12 Dec 2019 14:09), Max: 98.5 (11 Dec 2019 20:57)  HR: 86 (12 Dec 2019 11:25) (77 - 86)  BP: 99/62 (12 Dec 2019 11:25) (99/62 - 124/67)  BP(mean): --  RR: 18 (12 Dec 2019 04:03) (17 - 18)  SpO2: 97% (12 Dec 2019 11:25) (95% - 99%)    PE  NAD  Awake, alert  Anicteric, MMM  RRR  CTAB  Abd soft, NT, ND  No c/c/e  No rash grossly  FROM                          8.2    3.99  )-----------( 39       ( 12 Dec 2019 08:16 )             24.0       12-12    133<L>  |  94<L>  |  34<H>  ----------------------------<  321<H>  3.8   |  25  |  1.42<H>    Ca    11.3<H>      12 Dec 2019 07:08  Phos  2.3     12-12    TPro  6.4  /  Alb  3.8  /  TBili  8.9<H>  /  DBili  x   /  AST  27  /  ALT  19  /  AlkPhos  121<H>  12-12

## 2019-12-12 NOTE — PROGRESS NOTE ADULT - ASSESSMENT
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    #Presumed JEAN cirrhosis, MELD-Na 12/11 30      - varices: none on EGD outside in 10/2019      - HE: present, on lactulose and Xifaxan       - ascites: none currently, but history of ascites with SBP      - HCC: none on US 11/27, AFP 23 in 10/19; MRI w/o lesions from 12/6      - Vascular assessment: poor US doppler exam for eval of vessels due to body habitus       - Liver Workup: HAV immune, HBV non-immune, HCV neg, HIV neg, iron studies consistent wiith AoCD, RPR neg, QG neg, CMV neg, EBV IgG pos only, HSV1 pos, ASMA neg, AMA neg, ceruloplasmin LOW 12      - Age appropriate screening: colonoscopy 5-6 months ago per patient      - Dental evaluation: >1 year ago, no loose teeth per patient      - Cardiac evaluation: LHC completed 12/5, mild-mod non-occlusive CAD with low filling pressures, no interventions needed       - Blood type A+, Ab neg      - Previous transplant workup: none      - Social: retired, previous , lives in [place] with wife and son, previously highly functional (before October), Insurance -Medicare HMO  # MISA, Cr stable, likely pre-renal vs HRS; urine sodium still low, likely HRS  # Chest wall hematoma  # T2DM  # Hypertension (currently normotensive)    Recs:  - continue IV Albumin q8 hrs today (Cr improved with albumin)  - will review imaging with radiology to eval for splenorenal shunt and whether it would be amenable to embolization (for treatment of refractory encephalopathy)  - obtain pt's outpatient urology records and biopsy reports  - continue IV antibiotics per ID for Urinary tract infection  - PT evaluation and recommendations for discharge to rehab   - continue midodrine 20mg TID, continue to hold diuretics  - continue lactulose titrated to 3 BMs daily, Xifaxan BID  - EGD/colon report in the chart  - patient to be discussed at transplant evaluation committee as outpatient, followup with Dr. Medina in transplant clinic

## 2019-12-13 DIAGNOSIS — K74.60 UNSPECIFIED CIRRHOSIS OF LIVER: ICD-10-CM

## 2019-12-13 DIAGNOSIS — N39.0 URINARY TRACT INFECTION, SITE NOT SPECIFIED: ICD-10-CM

## 2019-12-13 LAB
24R-OH-CALCIDIOL SERPL-MCNC: 25.3 NG/ML — LOW (ref 30–80)
ALBUMIN SERPL ELPH-MCNC: 3.5 G/DL — SIGNIFICANT CHANGE UP (ref 3.3–5)
ALBUMIN SERPL ELPH-MCNC: 3.7 G/DL — SIGNIFICANT CHANGE UP (ref 3.3–5)
ALP SERPL-CCNC: 100 U/L — SIGNIFICANT CHANGE UP (ref 40–120)
ALP SERPL-CCNC: 104 U/L — SIGNIFICANT CHANGE UP (ref 40–120)
ALT FLD-CCNC: 18 U/L — SIGNIFICANT CHANGE UP (ref 10–45)
ALT FLD-CCNC: 18 U/L — SIGNIFICANT CHANGE UP (ref 10–45)
ANION GAP SERPL CALC-SCNC: 10 MMOL/L — SIGNIFICANT CHANGE UP (ref 5–17)
ANION GAP SERPL CALC-SCNC: 11 MMOL/L — SIGNIFICANT CHANGE UP (ref 5–17)
ANION GAP SERPL CALC-SCNC: 12 MMOL/L — SIGNIFICANT CHANGE UP (ref 5–17)
ANISOCYTOSIS BLD QL: SLIGHT — SIGNIFICANT CHANGE UP
APTT BLD: 52.5 SEC — HIGH (ref 27.5–36.3)
AST SERPL-CCNC: 31 U/L — SIGNIFICANT CHANGE UP (ref 10–40)
AST SERPL-CCNC: 31 U/L — SIGNIFICANT CHANGE UP (ref 10–40)
BASOPHILS # BLD AUTO: 0.04 K/UL — SIGNIFICANT CHANGE UP (ref 0–0.2)
BASOPHILS NFR BLD AUTO: 1 % — SIGNIFICANT CHANGE UP (ref 0–2)
BILIRUB SERPL-MCNC: 8.3 MG/DL — HIGH (ref 0.2–1.2)
BILIRUB SERPL-MCNC: 8.8 MG/DL — HIGH (ref 0.2–1.2)
BUN SERPL-MCNC: 29 MG/DL — HIGH (ref 7–23)
BUN SERPL-MCNC: 30 MG/DL — HIGH (ref 7–23)
BUN SERPL-MCNC: 30 MG/DL — HIGH (ref 7–23)
CALCIUM SERPL-MCNC: 10.3 MG/DL — SIGNIFICANT CHANGE UP (ref 8.4–10.5)
CALCIUM SERPL-MCNC: 10.3 MG/DL — SIGNIFICANT CHANGE UP (ref 8.4–10.5)
CALCIUM SERPL-MCNC: 10.6 MG/DL — HIGH (ref 8.4–10.5)
CALCIUM SERPL-MCNC: 10.9 MG/DL — HIGH (ref 8.4–10.5)
CHLORIDE SERPL-SCNC: 95 MMOL/L — LOW (ref 96–108)
CHLORIDE SERPL-SCNC: 97 MMOL/L — SIGNIFICANT CHANGE UP (ref 96–108)
CHLORIDE SERPL-SCNC: 97 MMOL/L — SIGNIFICANT CHANGE UP (ref 96–108)
CO2 SERPL-SCNC: 24 MMOL/L — SIGNIFICANT CHANGE UP (ref 22–31)
CO2 SERPL-SCNC: 26 MMOL/L — SIGNIFICANT CHANGE UP (ref 22–31)
CO2 SERPL-SCNC: 27 MMOL/L — SIGNIFICANT CHANGE UP (ref 22–31)
CREAT SERPL-MCNC: 1.23 MG/DL — SIGNIFICANT CHANGE UP (ref 0.5–1.3)
CREAT SERPL-MCNC: 1.27 MG/DL — SIGNIFICANT CHANGE UP (ref 0.5–1.3)
CREAT SERPL-MCNC: 1.32 MG/DL — HIGH (ref 0.5–1.3)
EOSINOPHIL # BLD AUTO: 0.18 K/UL — SIGNIFICANT CHANGE UP (ref 0–0.5)
EOSINOPHIL NFR BLD AUTO: 4.5 % — SIGNIFICANT CHANGE UP (ref 0–6)
GLUCOSE BLDC GLUCOMTR-MCNC: 148 MG/DL — HIGH (ref 70–99)
GLUCOSE BLDC GLUCOMTR-MCNC: 190 MG/DL — HIGH (ref 70–99)
GLUCOSE BLDC GLUCOMTR-MCNC: 197 MG/DL — HIGH (ref 70–99)
GLUCOSE BLDC GLUCOMTR-MCNC: 200 MG/DL — HIGH (ref 70–99)
GLUCOSE SERPL-MCNC: 117 MG/DL — HIGH (ref 70–99)
GLUCOSE SERPL-MCNC: 136 MG/DL — HIGH (ref 70–99)
GLUCOSE SERPL-MCNC: 162 MG/DL — HIGH (ref 70–99)
HCT VFR BLD CALC: 22.8 % — LOW (ref 39–50)
HGB BLD-MCNC: 7.7 G/DL — LOW (ref 13–17)
HYPOCHROMIA BLD QL: SLIGHT — SIGNIFICANT CHANGE UP
IMM GRANULOCYTES NFR BLD AUTO: 0.3 % — SIGNIFICANT CHANGE UP (ref 0–1.5)
INR BLD: 2.23 RATIO — HIGH (ref 0.88–1.16)
LYMPHOCYTES # BLD AUTO: 1.46 K/UL — SIGNIFICANT CHANGE UP (ref 1–3.3)
LYMPHOCYTES # BLD AUTO: 36.6 % — SIGNIFICANT CHANGE UP (ref 13–44)
MACROCYTES BLD QL: SLIGHT — SIGNIFICANT CHANGE UP
MANUAL SMEAR VERIFICATION: SIGNIFICANT CHANGE UP
MCHC RBC-ENTMCNC: 32.9 PG — SIGNIFICANT CHANGE UP (ref 27–34)
MCHC RBC-ENTMCNC: 33.8 GM/DL — SIGNIFICANT CHANGE UP (ref 32–36)
MCV RBC AUTO: 97.4 FL — SIGNIFICANT CHANGE UP (ref 80–100)
MONOCYTES # BLD AUTO: 0.48 K/UL — SIGNIFICANT CHANGE UP (ref 0–0.9)
MONOCYTES NFR BLD AUTO: 12 % — SIGNIFICANT CHANGE UP (ref 2–14)
NEUTROPHILS # BLD AUTO: 1.82 K/UL — SIGNIFICANT CHANGE UP (ref 1.8–7.4)
NEUTROPHILS NFR BLD AUTO: 45.6 % — SIGNIFICANT CHANGE UP (ref 43–77)
OVALOCYTES BLD QL SMEAR: SLIGHT — SIGNIFICANT CHANGE UP
PLATELET # BLD AUTO: 39 K/UL — LOW (ref 150–400)
POIKILOCYTOSIS BLD QL AUTO: SLIGHT — SIGNIFICANT CHANGE UP
POTASSIUM SERPL-MCNC: 3.6 MMOL/L — SIGNIFICANT CHANGE UP (ref 3.5–5.3)
POTASSIUM SERPL-MCNC: 3.7 MMOL/L — SIGNIFICANT CHANGE UP (ref 3.5–5.3)
POTASSIUM SERPL-MCNC: 3.7 MMOL/L — SIGNIFICANT CHANGE UP (ref 3.5–5.3)
POTASSIUM SERPL-SCNC: 3.6 MMOL/L — SIGNIFICANT CHANGE UP (ref 3.5–5.3)
POTASSIUM SERPL-SCNC: 3.7 MMOL/L — SIGNIFICANT CHANGE UP (ref 3.5–5.3)
POTASSIUM SERPL-SCNC: 3.7 MMOL/L — SIGNIFICANT CHANGE UP (ref 3.5–5.3)
PROT SERPL-MCNC: 5.5 G/DL — LOW (ref 6–8.3)
PROT SERPL-MCNC: 5.9 G/DL — LOW (ref 6–8.3)
PROTHROM AB SERPL-ACNC: 26.3 SEC — HIGH (ref 10–12.9)
PTH-INTACT FLD-MCNC: 12 PG/ML — LOW (ref 15–65)
RBC # BLD: 2.34 M/UL — LOW (ref 4.2–5.8)
RBC # FLD: 18.5 % — HIGH (ref 10.3–14.5)
RBC BLD AUTO: ABNORMAL
SODIUM SERPL-SCNC: 131 MMOL/L — LOW (ref 135–145)
SODIUM SERPL-SCNC: 134 MMOL/L — LOW (ref 135–145)
SODIUM SERPL-SCNC: 134 MMOL/L — LOW (ref 135–145)
VIT D25+D1,25 OH+D1,25 PNL SERPL-MCNC: 16.6 PG/ML — LOW (ref 19.9–79.3)
WBC # BLD: 3.99 K/UL — SIGNIFICANT CHANGE UP (ref 3.8–10.5)
WBC # FLD AUTO: 3.99 K/UL — SIGNIFICANT CHANGE UP (ref 3.8–10.5)

## 2019-12-13 PROCEDURE — 93306 TTE W/DOPPLER COMPLETE: CPT | Mod: 26

## 2019-12-13 PROCEDURE — 99232 SBSQ HOSP IP/OBS MODERATE 35: CPT

## 2019-12-13 RX ORDER — FUROSEMIDE 40 MG
20 TABLET ORAL ONCE
Refills: 0 | Status: COMPLETED | OUTPATIENT
Start: 2019-12-13 | End: 2019-12-13

## 2019-12-13 RX ADMIN — Medication 1 GRAM(S): at 05:52

## 2019-12-13 RX ADMIN — ATORVASTATIN CALCIUM 10 MILLIGRAM(S): 80 TABLET, FILM COATED ORAL at 22:13

## 2019-12-13 RX ADMIN — MIDODRINE HYDROCHLORIDE 20 MILLIGRAM(S): 2.5 TABLET ORAL at 17:09

## 2019-12-13 RX ADMIN — LACTULOSE 30 GRAM(S): 10 SOLUTION ORAL at 02:49

## 2019-12-13 RX ADMIN — INSULIN GLARGINE 28 UNIT(S): 100 INJECTION, SOLUTION SUBCUTANEOUS at 22:12

## 2019-12-13 RX ADMIN — Medication 1: at 13:07

## 2019-12-13 RX ADMIN — Medication 1 DROP(S): at 22:15

## 2019-12-13 RX ADMIN — LACTULOSE 30 GRAM(S): 10 SOLUTION ORAL at 05:52

## 2019-12-13 RX ADMIN — ERTAPENEM SODIUM 120 MILLIGRAM(S): 1 INJECTION, POWDER, LYOPHILIZED, FOR SOLUTION INTRAMUSCULAR; INTRAVENOUS at 18:39

## 2019-12-13 RX ADMIN — MIDODRINE HYDROCHLORIDE 20 MILLIGRAM(S): 2.5 TABLET ORAL at 05:51

## 2019-12-13 RX ADMIN — Medication 1: at 17:07

## 2019-12-13 RX ADMIN — TAMSULOSIN HYDROCHLORIDE 0.4 MILLIGRAM(S): 0.4 CAPSULE ORAL at 22:13

## 2019-12-13 RX ADMIN — Medication 1 DROP(S): at 13:08

## 2019-12-13 RX ADMIN — LACTULOSE 30 GRAM(S): 10 SOLUTION ORAL at 13:11

## 2019-12-13 RX ADMIN — Medication 8 UNIT(S): at 09:21

## 2019-12-13 RX ADMIN — Medication 8 UNIT(S): at 13:07

## 2019-12-13 RX ADMIN — Medication 20 MILLIGRAM(S): at 18:39

## 2019-12-13 RX ADMIN — PANTOPRAZOLE SODIUM 40 MILLIGRAM(S): 20 TABLET, DELAYED RELEASE ORAL at 05:52

## 2019-12-13 RX ADMIN — LACTULOSE 30 GRAM(S): 10 SOLUTION ORAL at 17:08

## 2019-12-13 RX ADMIN — LACTULOSE 30 GRAM(S): 10 SOLUTION ORAL at 09:21

## 2019-12-13 RX ADMIN — Medication 1 GRAM(S): at 17:08

## 2019-12-13 RX ADMIN — LACTULOSE 30 GRAM(S): 10 SOLUTION ORAL at 22:13

## 2019-12-13 RX ADMIN — MIDODRINE HYDROCHLORIDE 20 MILLIGRAM(S): 2.5 TABLET ORAL at 13:11

## 2019-12-13 RX ADMIN — Medication 8 UNIT(S): at 17:07

## 2019-12-13 NOTE — PROGRESS NOTE ADULT - ATTENDING COMMENTS
Hepatology Staff: Yomi Medina MD    I saw and examined the patient along with  Dr. Huang 12-13-19 @ 08:30.    Patient Medical Record, hospital course was reviewed and summarized as below:     Hemodynamic Status: stable, but confused.  Vitals: Vital Signs Last 24 Hrs  T(C): 36.8 (13 Dec 2019 04:19), Max: 36.8 (13 Dec 2019 04:19)  T(F): 98.2 (13 Dec 2019 04:19), Max: 98.2 (13 Dec 2019 04:19)  HR: 69 (13 Dec 2019 04:19) (69 - 86)  BP: 116/62 (13 Dec 2019 04:19) (99/62 - 123/66)  BP(mean): --  RR: 18 (13 Dec 2019 04:19) (18 - 18)  SpO2: 97% (13 Dec 2019 04:19) (95% - 97%)    Labs:Creatinine, Serum: 1.32 mg/dL (12-13-19 @ 06:43)  Bilirubin Total, Serum: 8.3 mg/dL (12-13-19 @ 06:43)  Creatinine, Serum: 1.48 mg/dL (12-12-19 @ 20:28)    Imaging Studies:  I/O: I&O's Summary    12 Dec 2019 07:01  -  13 Dec 2019 07:00  --------------------------------------------------------  IN: 600 mL / OUT: 1050 mL / NET: -450 mL      Nutritional Status:   Albumin, Serum: 3.5 g/dL (12-13-19 @ 06:43)    Last 24 hour events: Patient is confused.    Recommendations: Please titrate Lactulose to 30 ml every 30 mins until BM then re-adjust dose to every 6-8 hrs. Add Zinc Sulfate 220 mg bid. Keep the rest the same.    Plan discussed with Primary team. Hepatology Staff: Yomi Medina MD    I saw and examined the patient along with  Dr. Huang 12-13-19 @ 08:30.    Patient Medical Record, hospital course was reviewed and summarized as below:     Hemodynamic Status: stable, but confused.  Vitals: Vital Signs Last 24 Hrs  T(C): 36.8 (13 Dec 2019 04:19), Max: 36.8 (13 Dec 2019 04:19)  T(F): 98.2 (13 Dec 2019 04:19), Max: 98.2 (13 Dec 2019 04:19)  HR: 69 (13 Dec 2019 04:19) (69 - 86)  BP: 116/62 (13 Dec 2019 04:19) (99/62 - 123/66)  BP(mean): --  RR: 18 (13 Dec 2019 04:19) (18 - 18)  SpO2: 97% (13 Dec 2019 04:19) (95% - 97%)    Labs:Creatinine, Serum: 1.32 mg/dL (12-13-19 @ 06:43)  Bilirubin Total, Serum: 8.3 mg/dL (12-13-19 @ 06:43)  Creatinine, Serum: 1.48 mg/dL (12-12-19 @ 20:28)    Imaging Studies:  I/O: I&O's Summary    12 Dec 2019 07:01  -  13 Dec 2019 07:00  --------------------------------------------------------  IN: 600 mL / OUT: 1050 mL / NET: -450 mL      Nutritional Status:   Albumin, Serum: 3.5 g/dL (12-13-19 @ 06:43)    Last 24 hour events: Patient is confused.    Recommendations: Please titrate Lactulose to for 3 BMs a day. Add Zinc Sulfate 220 mg bid. Keep the rest the same.    Plan discussed with Primary team.

## 2019-12-13 NOTE — PROGRESS NOTE ADULT - ASSESSMENT
pt with above history p/w difficulty ambulation / weakness       - flap tremor + on Exam , likely hepatic encephalopathy -  pt having bms , monitor mental status closely , appreciate Hepatology consult input ,     - hyperbilirubinemia-GI f/u , cont rifaximin     - ARF on ckd stage 3 - likely hepatorenal , avoid nephrotoxic agents bladder scan to r/o retention , renal f/u -    - Anemia- transfuse half unit , monitor closely   - dm2 - iss     - hyponatremia- renal f/u     - Thrombocytopenia - likely sec to liver pathology     - coagulopathy - likely sec to liver pathology , monitor closely for active signs of bleeding , vit k     - HLD - pt om parvastaTIN     had extensive lengthy discussion with pts wife /sons about pts current clinical status, management plan &  prognosis - guarded prognosis - palliative eval    ppi

## 2019-12-13 NOTE — PROGRESS NOTE ADULT - PROBLEM SELECTOR PLAN 1
serum creatinine is 1.27   , approximating GFR is increased    ml/min.   There is  progression . No uremic symptoms    pos evidence of anemia .  Fluid status stable.  Will continue to avoid nephrotoxic drugs.  Patient remains asymptomatic.   Continue current therapy.

## 2019-12-13 NOTE — PROGRESS NOTE ADULT - ASSESSMENT
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    #Presumed JEAN cirrhosis, MELD-Na 12/11 30      - varices: none on EGD outside in 10/2019      - HE: present, on lactulose and Xifaxan       - ascites: none currently, but history of ascites with SBP      - HCC: none on US 11/27, AFP 23 in 10/19; MRI w/o lesions from 12/6      - Vascular assessment: poor US doppler exam for eval of vessels due to body habitus       - Liver Workup: HAV immune, HBV non-immune, HCV neg, HIV neg, iron studies consistent wiith AoCD, RPR neg, QG neg, CMV neg, EBV IgG pos only, HSV1 pos, ASMA neg, AMA neg, ceruloplasmin LOW 12      - Age appropriate screening: colonoscopy 5-6 months ago per patient      - Dental evaluation: >1 year ago, no loose teeth per patient      - Cardiac evaluation: LHC completed 12/5, mild-mod non-occlusive CAD with low filling pressures, no interventions needed       - Blood type A+, Ab neg      - Previous transplant workup: none      - Social: retired, previous , lives in [place] with wife and son, previously highly functional (before October), Insurance -Medicare HMO  # MISA, Cr stable, likely pre-renal vs HRS; urine sodium still low, likely HRS  # Chest wall hematoma  # T2DM  # Hypertension (currently normotensive)    Recs:  - please ensure MELD-NA labs drawn today (CBC, CMP, INR)  - stop IV Albumin as Cr improved  - increase lactulose to 30mg q4-q6 hours (want to ensure 3 BMs daily)  - can also add Miralax BID  - continue Xifxan BID  - will review imaging with IR to eval for splenorenal shunt and whether it would be amenable to embolization (for treatment of refractory encephalopathy)  - obtain pt's outpatient urology records and biopsy reports  - discuss with ID re: stop date for antibiotics for UTI  - continue PT  - continue midodrine 20mg TID, continue to hold diuretics  - EGD/colon report in the chart  - patient to be discussed at transplant evaluation committee today, followup with Dr. Median in transplant clinic  - ok for discharge from hepatology perspective

## 2019-12-13 NOTE — PROGRESS NOTE ADULT - SUBJECTIVE AND OBJECTIVE BOX
Chief Complaint:  Patient is a 64y old  Male who presents with a chief complaint of difficulty to ambulate/ weakness (13 Dec 2019 07:17)      Interval Events:     Allergies:  codeine (Anaphylaxis)  NO  RED MEAT (Unknown)      Hospital Medications:  atorvastatin 10 milliGRAM(s) Oral at bedtime  dextrose 40% Gel 15 Gram(s) Oral once PRN  dextrose 5%. 1000 milliLiter(s) IV Continuous <Continuous>  dextrose 50% Injectable 12.5 Gram(s) IV Push once  dextrose 50% Injectable 25 Gram(s) IV Push once  dextrose 50% Injectable 25 Gram(s) IV Push once  ertapenem  IVPB      ertapenem  IVPB 1000 milliGRAM(s) IV Intermittent every 24 hours  glucagon  Injectable 1 milliGRAM(s) IntraMuscular once PRN  insulin glargine Injectable (LANTUS) 28 Unit(s) SubCutaneous at bedtime  insulin lispro (HumaLOG) corrective regimen sliding scale   SubCutaneous three times a day before meals  insulin lispro (HumaLOG) corrective regimen sliding scale   SubCutaneous at bedtime  insulin lispro Injectable (HumaLOG) 8 Unit(s) SubCutaneous three times a day before meals  lactulose Syrup 30 Gram(s) Oral every 4 hours  midodrine. 20 milliGRAM(s) Oral three times a day  pantoprazole    Tablet 40 milliGRAM(s) Oral before breakfast  rifAXIMin 550 milliGRAM(s) Oral two times a day  sucralfate 1 Gram(s) Oral two times a day  tamsulosin 0.4 milliGRAM(s) Oral at bedtime      PMHX/PSHX:  GIB (gastrointestinal bleeding)  GERD with esophagitis  Hepatic encephalopathy  Obesity  Fatty liver disease, nonalcoholic  Renal stones  Hypertension  Neuropathy  Hypercholesteremia  Diabetes  S/P cholecystectomy  No significant past surgical history      Family history:  Family history of type 2 diabetes mellitus  Family history of hypertension  Family history of stomach cancer  No pertinent family history in first degree relatives      ROS:     General:  No wt loss, fevers, chills, night sweats, fatigue,   Eyes:  Good vision, no reported pain  ENT:  No sore throat, pain, runny nose, dysphagia  CV:  No pain, palpitations, hypo/hypertension  Resp:  No dyspnea, cough, tachypnea, wheezing  GI:  See HPI  :  No pain, bleeding, incontinence, nocturia  Muscle:  No pain, weakness  Neuro:  No weakness, tingling, memory problems  Psych:  No fatigue, insomnia, mood problems, depression  Endocrine:  No polyuria, polydipsia, cold/heat intolerance  Heme:  No petechiae, ecchymosis, easy bruisability  Skin:  No rash, edema      PHYSICAL EXAM:     GENERAL:  Appears stated age, well-groomed, well-nourished, no distress  HEENT:  NC/AT,  conjunctivae clear, sclera -anicteric  CHEST:  Full & symmetric excursion, no increased effort, breath sounds clear  HEART:  Regular rhythm, S1, S2, no murmur/rub/S3/S4,  no edema  ABDOMEN:  Soft, non-tender, non-distended, normoactive bowel sounds,  no masses ,no hepato-splenomegaly,   EXTREMITIES:  no cyanosis,clubbing or edema  SKIN:  No rash/erythema/ecchymoses/petechiae/wounds/abscess/warm/dry  NEURO:  Alert, oriented    Vital Signs:  Vital Signs Last 24 Hrs  T(C): 36.8 (13 Dec 2019 04:19), Max: 36.8 (13 Dec 2019 04:19)  T(F): 98.2 (13 Dec 2019 04:19), Max: 98.2 (13 Dec 2019 04:19)  HR: 69 (13 Dec 2019 04:19) (69 - 86)  BP: 116/62 (13 Dec 2019 04:19) (99/62 - 123/66)  BP(mean): --  RR: 18 (13 Dec 2019 04:19) (18 - 18)  SpO2: 97% (13 Dec 2019 04:19) (95% - 97%)  Daily     Daily     LABS:                        8.2    3.99  )-----------( 39       ( 12 Dec 2019 08:16 )             24.0     12-13    134<L>  |  97  |  30<H>  ----------------------------<  117<H>  3.7   |  26  |  1.32<H>    Ca    10.3      13 Dec 2019 06:43  Phos  2.3     12-12    TPro  5.5<L>  /  Alb  3.5  /  TBili  8.3<H>  /  DBili  x   /  AST  31  /  ALT  18  /  AlkPhos  100  12-13    LIVER FUNCTIONS - ( 13 Dec 2019 06:43 )  Alb: 3.5 g/dL / Pro: 5.5 g/dL / ALK PHOS: 100 U/L / ALT: 18 U/L / AST: 31 U/L / GGT: x           PT/INR - ( 11 Dec 2019 08:57 )   PT: 26.1 sec;   INR: 2.21 ratio                 Imaging: Chief Complaint:  Patient is a 64y old  Male who presents with a chief complaint of difficulty to ambulate/ weakness (13 Dec 2019 07:17)      Interval Events: Improved mentation this morning.  Only 1 BM yesterday.    Allergies:  codeine (Anaphylaxis)  NO  RED MEAT (Unknown)      Hospital Medications:  atorvastatin 10 milliGRAM(s) Oral at bedtime  dextrose 40% Gel 15 Gram(s) Oral once PRN  dextrose 5%. 1000 milliLiter(s) IV Continuous <Continuous>  dextrose 50% Injectable 12.5 Gram(s) IV Push once  dextrose 50% Injectable 25 Gram(s) IV Push once  dextrose 50% Injectable 25 Gram(s) IV Push once  ertapenem  IVPB      ertapenem  IVPB 1000 milliGRAM(s) IV Intermittent every 24 hours  glucagon  Injectable 1 milliGRAM(s) IntraMuscular once PRN  insulin glargine Injectable (LANTUS) 28 Unit(s) SubCutaneous at bedtime  insulin lispro (HumaLOG) corrective regimen sliding scale   SubCutaneous three times a day before meals  insulin lispro (HumaLOG) corrective regimen sliding scale   SubCutaneous at bedtime  insulin lispro Injectable (HumaLOG) 8 Unit(s) SubCutaneous three times a day before meals  lactulose Syrup 30 Gram(s) Oral every 4 hours  midodrine. 20 milliGRAM(s) Oral three times a day  pantoprazole    Tablet 40 milliGRAM(s) Oral before breakfast  rifAXIMin 550 milliGRAM(s) Oral two times a day  sucralfate 1 Gram(s) Oral two times a day  tamsulosin 0.4 milliGRAM(s) Oral at bedtime      PMHX/PSHX:  GIB (gastrointestinal bleeding)  GERD with esophagitis  Hepatic encephalopathy  Obesity  Fatty liver disease, nonalcoholic  Renal stones  Hypertension  Neuropathy  Hypercholesteremia  Diabetes  S/P cholecystectomy  No significant past surgical history      Family history:  Family history of type 2 diabetes mellitus  Family history of hypertension  Family history of stomach cancer  No pertinent family history in first degree relatives      ROS:     General:  No wt loss, fevers, chills, night sweats, fatigue,   Eyes:  Good vision, no reported pain  ENT:  No sore throat, pain, runny nose, dysphagia  CV:  No pain, palpitations, hypo/hypertension  Resp:  No dyspnea, cough, tachypnea, wheezing  GI:  See HPI  :  No pain, bleeding, incontinence, nocturia  Muscle:  No pain, weakness  Neuro:  No weakness, tingling, memory problems  Psych:  No fatigue, insomnia, mood problems, depression  Endocrine:  No polyuria, polydipsia, cold/heat intolerance  Heme:  No petechiae, ecchymosis, easy bruisability  Skin:  No rash, edema      PHYSICAL EXAM:     GENERAL: NAD  HEENT:  NC/AT  CHEST:  Full & symmetric excursion, no increased effort  ABDOMEN:  Soft, non-tender, non-distended, +BS  EXTREMITIES:  no edema  SKIN:  No rash  NEURO:  Alert      Vital Signs:  Vital Signs Last 24 Hrs  T(C): 36.8 (13 Dec 2019 04:19), Max: 36.8 (13 Dec 2019 04:19)  T(F): 98.2 (13 Dec 2019 04:19), Max: 98.2 (13 Dec 2019 04:19)  HR: 69 (13 Dec 2019 04:19) (69 - 86)  BP: 116/62 (13 Dec 2019 04:19) (99/62 - 123/66)  BP(mean): --  RR: 18 (13 Dec 2019 04:19) (18 - 18)  SpO2: 97% (13 Dec 2019 04:19) (95% - 97%)  Daily     Daily     LABS:                        8.2    3.99  )-----------( 39       ( 12 Dec 2019 08:16 )             24.0     12-13    134<L>  |  97  |  30<H>  ----------------------------<  117<H>  3.7   |  26  |  1.32<H>    Ca    10.3      13 Dec 2019 06:43  Phos  2.3     12-12    TPro  5.5<L>  /  Alb  3.5  /  TBili  8.3<H>  /  DBili  x   /  AST  31  /  ALT  18  /  AlkPhos  100  12-13    LIVER FUNCTIONS - ( 13 Dec 2019 06:43 )  Alb: 3.5 g/dL / Pro: 5.5 g/dL / ALK PHOS: 100 U/L / ALT: 18 U/L / AST: 31 U/L / GGT: x           PT/INR - ( 11 Dec 2019 08:57 )   PT: 26.1 sec;   INR: 2.21 ratio                 Imaging:    reviewed

## 2019-12-13 NOTE — PROGRESS NOTE ADULT - SUBJECTIVE AND OBJECTIVE BOX
SUBJECTIVE / OVERNIGHT EVENTS: pt denies headache, nausea,v   chest pain, shortness of breath ,     MEDICATIONS  (STANDING):  artificial  tears Solution 1 Drop(s) Both EYES three times a day  atorvastatin 10 milliGRAM(s) Oral at bedtime  dextrose 5%. 1000 milliLiter(s) (50 mL/Hr) IV Continuous <Continuous>  dextrose 50% Injectable 12.5 Gram(s) IV Push once  dextrose 50% Injectable 25 Gram(s) IV Push once  dextrose 50% Injectable 25 Gram(s) IV Push once  ertapenem  IVPB      ertapenem  IVPB 1000 milliGRAM(s) IV Intermittent every 24 hours  insulin glargine Injectable (LANTUS) 28 Unit(s) SubCutaneous at bedtime  insulin lispro (HumaLOG) corrective regimen sliding scale   SubCutaneous three times a day before meals  insulin lispro (HumaLOG) corrective regimen sliding scale   SubCutaneous at bedtime  insulin lispro Injectable (HumaLOG) 8 Unit(s) SubCutaneous three times a day before meals  lactulose Syrup 30 Gram(s) Oral every 4 hours  midodrine. 20 milliGRAM(s) Oral three times a day  pantoprazole    Tablet 40 milliGRAM(s) Oral before breakfast  rifAXIMin 550 milliGRAM(s) Oral two times a day  sucralfate 1 Gram(s) Oral two times a day  tamsulosin 0.4 milliGRAM(s) Oral at bedtime    MEDICATIONS  (PRN):  dextrose 40% Gel 15 Gram(s) Oral once PRN Blood Glucose LESS THAN 70 milliGRAM(s)/deciliter  glucagon  Injectable 1 milliGRAM(s) IntraMuscular once PRN Glucose LESS THAN 70 milligrams/deciliter    Vital Signs Last 24 Hrs  T(C): 36.5 (13 Dec 2019 19:15), Max: 36.8 (13 Dec 2019 04:19)  T(F): 97.7 (13 Dec 2019 19:15), Max: 98.2 (13 Dec 2019 04:19)  HR: 77 (13 Dec 2019 19:15) (69 - 80)  BP: 109/58 (13 Dec 2019 19:15) (109/58 - 123/66)  BP(mean): --  RR: 18 (13 Dec 2019 19:15) (18 - 18)  SpO2: 97% (13 Dec 2019 19:15) (95% - 99%)    Constitutional: No fever, fatigue  Skin: No rash.  Eyes: No recent vision problems or eye pain.  ENT: No congestion, ear pain, or sore throat.  Cardiovascular: No chest pain or palpation.  Respiratory: No cough, shortness of breath, congestion, or wheezing.  Gastrointestinal: No abdominal pain, nausea, vomiting, or diarrhea.  Genitourinary: No dysuria.  Musculoskeletal: No joint swelling.  Neurologic: No headache.    PHYSICAL EXAM:  GENERAL: NAD  EYES: EOMI, PERRLA  NECK: Supple, No JVD  CHEST/LUNG: dec breath sounds at bases   HEART:  S1 , S2 +  ABDOMEN: soft , bs+, mild distension + , no tenderness  EXTREMITIES:  no edema  NEUROLOGY:alert awake oriented   flap tremor+    LABS:      131<L>  |  95<L>  |  29<H>  ----------------------------<  162<H>  3.6   |  24  |  1.23    Ca    10.9<H>      13 Dec 2019 19:27  Phos  2.3     12-    TPro  5.9<L>  /  Alb  3.7  /  TBili  8.8<H>  /  DBili      /  AST  31  /  ALT  18  /  AlkPhos  104      Creatinine Trend: 1.23 <--, 1.27 <--, 1.32 <--, 1.48 <--, 1.42 <--, 1.39 <--, 1.54 <--, 1.86 <--, 1.80 <--, 1.81 <--, 1.75 <--, 1.76 <--, 1.80 <--, 1.88 <--                        7.7    3.99  )-----------( 39       ( 13 Dec 2019 08:28 )             22.8     Urine Studies:  Urinalysis Basic - ( 10 Dec 2019 22:55 )    Color: Dark Red / Appearance: Turbid / S.025 / pH:   Gluc:  / Ketone: Trace  / Bili: Small / Urobili: <2 mg/dL   Blood:  / Protein: 100 mg/dL / Nitrite: Negative   Leuk Esterase: Moderate / RBC: >720 /HPF / WBC >720 /HPF   Sq Epi:  / Non Sq Epi: 0 /HPF / Bacteria: Few      Sodium, Random Urine: <35 mmol/L ( @ 16:31)          LIVER FUNCTIONS - ( 13 Dec 2019 08:30 )  Alb: 3.7 g/dL / Pro: 5.9 g/dL / ALK PHOS: 104 U/L / ALT: 18 U/L / AST: 31 U/L / GGT: x           PT/INR - ( 13 Dec 2019 08:30 )   PT: 26.3 sec;   INR: 2.23 ratio         PTT - ( 13 Dec 2019 08:30 )  PTT:52.5 sec  Color: Dark Red / Appearance: Turbid / S.025 / pH:   Gluc:  / Ketone: Trace  / Bili: Small / Urobili: <2 mg/dL   Blood:  / Protein: 100 mg/dL / Nitrite: Negative   Leuk Esterase: Moderate / RBC: >720 /HPF / WBC >720 /HPF   Sq Epi:  / Non Sq Epi: 0 /HPF / Bacteria: Few      Sodium, Random Urine: <35 mmol/L ( @ 16:31)          LIVER FUNCTIONS - ( 12 Dec 2019 07:08 )  Alb: 3.8 g/dL / Pro: 6.4 g/dL / ALK PHOS: 121 U/L / ALT: 19 U/L / AST: 27 U/L / GGT: x           PT/INR - ( 11 Dec 2019 08:57 )   PT: 26.1 sec;   INR: 2.21 ratio             Color: Dark Red / Appearance: Turbid / S.025 / pH:   Gluc:  / Ketone: Trace  / Bili: Small / Urobili: <2 mg/dL   Blood:  / Protein: 100 mg/dL / Nitrite: Negative   Leuk Esterase: Moderate / RBC: >720 /HPF / WBC >720 /HPF   Sq Epi:  / Non Sq Epi: 0 /HPF / Bacteria: Few      Sodium, Random Urine: <35 mmol/L ( @ 16:31)  Creatinine, Random Urine: 118 mg/dL ( @ 03:54)  Protein/Creatinine Ratio Calculation: 0.9 Ratio ( @ 03:54)          LIVER FUNCTIONS - ( 11 Dec 2019 07:05 )  Alb: 4.1 g/dL / Pro: 6.3 g/dL / ALK PHOS: 107 U/L / ALT: 19 U/L / AST: 30 U/L / GGT: x           PT/INR - ( 11 Dec 2019 08:57 )   PT: 26.1 sec;   INR: 2.21 ratio           Sq Epi:  / Non Sq Epi: 0 /hpf / Bacteria: Many      Sodium, Random Urine: <35 mmol/L ( @ 16:31)  Creatinine, Random Urine: 118 mg/dL ( @ 03:54)  Protein/Creatinine Ratio Calculation: 0.9 Ratio ( @ 03:54)          LIVER FUNCTIONS - ( 09 Dec 2019 07:01 )  Alb: 3.8 g/dL / Pro: 6.4 g/dL / ALK PHOS: 124 U/L / ALT: 17 U/L / AST: 25 U/L / GGT: x           PT/INR - ( 09 Dec 2019 08:21 )   PT: 24.4 sec;   INR: 2.09 ratio

## 2019-12-13 NOTE — PROGRESS NOTE ADULT - SUBJECTIVE AND OBJECTIVE BOX
[facility] - Sushila Dhaliwal, Doug, Morenita, Ayo, Colleen Williamson  Office Number:  [phone]    Patient resting comfortably in bed in NAD.  Laying flat with no respiratory distress.  No complaints of chest pain, dyspnea, palpitations, PND, or orthopnea.    F/U for:  hypotension, preoperative assessment    Telemetry:  OFF    MEDICATIONS  (STANDING):  atorvastatin 10 milliGRAM(s) Oral at bedtime  dextrose 5%. 1000 milliLiter(s) (50 mL/Hr) IV Continuous <Continuous>  dextrose 50% Injectable 12.5 Gram(s) IV Push once  dextrose 50% Injectable 25 Gram(s) IV Push once  dextrose 50% Injectable 25 Gram(s) IV Push once  ertapenem  IVPB      ertapenem  IVPB 1000 milliGRAM(s) IV Intermittent every 24 hours  insulin glargine Injectable (LANTUS) 28 Unit(s) SubCutaneous at bedtime  insulin lispro (HumaLOG) corrective regimen sliding scale   SubCutaneous three times a day before meals  insulin lispro (HumaLOG) corrective regimen sliding scale   SubCutaneous at bedtime  insulin lispro Injectable (HumaLOG) 8 Unit(s) SubCutaneous three times a day before meals  lactulose Syrup 30 Gram(s) Oral every 4 hours  midodrine. 20 milliGRAM(s) Oral three times a day  pantoprazole    Tablet 40 milliGRAM(s) Oral before breakfast  rifAXIMin 550 milliGRAM(s) Oral two times a day  sucralfate 1 Gram(s) Oral two times a day  tamsulosin 0.4 milliGRAM(s) Oral at bedtime    MEDICATIONS  (PRN):  dextrose 40% Gel 15 Gram(s) Oral once PRN Blood Glucose LESS THAN 70 milliGRAM(s)/deciliter  glucagon  Injectable 1 milliGRAM(s) IntraMuscular once PRN Glucose LESS THAN 70 milligrams/deciliter      Allergies    codeine (Anaphylaxis)    Intolerances    NO  RED MEAT (Unknown)      Vital Signs Last 24 Hrs  T(C): 36.8 (13 Dec 2019 04:19), Max: 36.8 (13 Dec 2019 04:19)  T(F): 98.2 (13 Dec 2019 04:19), Max: 98.2 (13 Dec 2019 04:19)  HR: 69 (13 Dec 2019 04:19) (69 - 86)  BP: 116/62 (13 Dec 2019 04:19) (99/62 - 123/66)  BP(mean): --  RR: 18 (13 Dec 2019 04:19) (18 - 18)  SpO2: 97% (13 Dec 2019 04:19) (95% - 97%)    I&O's Summary    12 Dec 2019 07:01  -  13 Dec 2019 07:00  --------------------------------------------------------  IN: 600 mL / OUT: 1050 mL / NET: -450 mL        ON EXAM:    Constitutional: NAD, awake   HEENT: Moist Mucous Membranes, Anicteric  Pulmonary: Decreased breath sounds b/l. No rales, crackles or wheeze appreciated.   Cardiovascular: Regular, S1 and S2, No murmurs, rubs, gallops or clicks  Gastrointestinal: Bowel Sounds present, soft, nontender.   Lymph: No peripheral edema. No lymphadenopathy.  Skin: No visible rashes or ulcers.  Psych:  Mood & affect appropriate for situation      LABS: All Labs Reviewed:                        8.2    3.99  )-----------( 39       ( 12 Dec 2019 08:16 )             24.0                         7.7    3.93  )-----------( 40       ( 11 Dec 2019 08:43 )             22.1                         9.1    4.80  )-----------( 39       ( 10 Dec 2019 09:09 )             26.4     12 Dec 2019 20:28    136    |  101    |  32     ----------------------------<  173    3.7     |  26     |  1.48   12 Dec 2019 07:08    133    |  94     |  34     ----------------------------<  321    3.8     |  25     |  1.42   11 Dec 2019 22:38    133    |  95     |  35     ----------------------------<  286    3.4     |  25     |  1.39     Ca    11.1       12 Dec 2019 20:28  Ca    11.3       12 Dec 2019 07:08  Ca    10.8       11 Dec 2019 22:38  Phos  2.3       12 Dec 2019 07:08    TPro  6.4    /  Alb  3.8    /  TBili  8.9    /  DBili  x      /  AST  27     /  ALT  19     /  AlkPhos  121    12 Dec 2019 07:08  TPro  6.3    /  Alb  4.1    /  TBili  8.5    /  DBili  x      /  AST  30     /  ALT  19     /  AlkPhos  107    11 Dec 2019 07:05    PT/INR - ( 11 Dec 2019 08:57 )   PT: 26.1 sec;   INR: 2.21 ratio               Blood Culture: Organism --  Gram Stain Blood -- Gram Stain --  Specimen Source .Urine Clean Catch (Midstream)  Culture-Blood --

## 2019-12-13 NOTE — PROGRESS NOTE ADULT - SUBJECTIVE AND OBJECTIVE BOX
INTERVAL HPI/OVERNIGHT EVENTS:  No new overnight event.  No N/V/D.  Tolerating diet.  less cvonfused    Allergies    codeine (Anaphylaxis)    Intolerances    NO  RED MEAT (Unknown)    General:  No wt loss, fevers, chills, night sweats, fatigue,   Eyes:  Good vision, no reported pain  ENT:  No sore throat, pain, runny nose, dysphagia  CV:  No pain, palpitations, hypo/hypertension  Resp:  No dyspnea, cough, tachypnea, wheezing  GI:  No pain, No nausea, No vomiting, No diarrhea, No constipation, No weight loss, No fever, No pruritis, No rectal bleeding, No tarry stools, No dysphagia,  :  No pain, bleeding, incontinence, nocturia  Muscle:  No pain, weakness  Neuro:  No weakness, tingling, memory problems  Psych:  No fatigue, insomnia, mood problems, depression  Endocrine:  No polyuria, polydipsia, cold/heat intolerance  Heme:  No petechiae, ecchymosis, easy bruisability  Skin:  No rash, tattoos, scars, edema      PHYSICAL EXAM:   Vital Signs:  Vital Signs Last 24 Hrs  T(C): 36.7 (13 Dec 2019 14:51), Max: 36.8 (13 Dec 2019 04:19)  T(F): 98.1 (13 Dec 2019 14:51), Max: 98.2 (13 Dec 2019 04:19)  HR: 69 (13 Dec 2019 04:19) (69 - 80)  BP: 116/62 (13 Dec 2019 04:19) (116/62 - 123/66)  BP(mean): --  RR: 18 (13 Dec 2019 14:51) (18 - 18)  SpO2: 99% (13 Dec 2019 14:51) (95% - 99%)  Daily     Daily I&O's Summary    12 Dec 2019 07:01  -  13 Dec 2019 07:00  --------------------------------------------------------  IN: 600 mL / OUT: 1050 mL / NET: -450 mL    13 Dec 2019 07:01  -  13 Dec 2019 17:55  --------------------------------------------------------  IN: 0 mL / OUT: 350 mL / NET: -350 mL        GENERAL:  Appears stated age, well-groomed, well-nourished, no distress  HEENT:  NC/AT,  conjunctivae clear and pink, no thyromegaly, nodules, adenopathy, no JVD, sclera -anicteric  CHEST:  Full & symmetric excursion, no increased effort, breath sounds clear  HEART:  Regular rhythm, S1, S2, no murmur/rub/S3/S4, no abdominal bruit, no edema  ABDOMEN:  Soft, non-tender, non-distended, normoactive bowel sounds,  no masses ,no hepato-splenomegaly, no signs of chronic liver disease  EXTEREMITIES:  no cyanosis,clubbing or edema  SKIN:  No rash/erythema/ecchymoses/petechiae/wounds/abscess/warm/dry  NEURO:  Alert, oriented, no asterixis, no tremor, no encephalopathy      LABS:                        7.7    3.99  )-----------( 39       ( 13 Dec 2019 08:28 )             22.8     12-13    134<L>  |  97  |  30<H>  ----------------------------<  136<H>  3.7   |  27  |  1.27    Ca    10.6<H>      13 Dec 2019 08:30  Phos  2.3     12-12    TPro  5.9<L>  /  Alb  3.7  /  TBili  8.8<H>  /  DBili  x   /  AST  31  /  ALT  18  /  AlkPhos  104  12-13    PT/INR - ( 13 Dec 2019 08:30 )   PT: 26.3 sec;   INR: 2.23 ratio         PTT - ( 13 Dec 2019 08:30 )  PTT:52.5 sec    amylase   lipase  RADIOLOGY & ADDITIONAL TESTS:

## 2019-12-13 NOTE — PROGRESS NOTE ADULT - ATTENDING COMMENTS
Patient being considered for transplant- Dr toussaint(ID transplant) will assume acre of ID issues staring Monday  Will tailor plan for ID issues  per course,results.Will defer to primary team on management of other issues.  case d/w Med NP  Infectious Diseases Service will cover over weekend.  Please call [phone] if issues

## 2019-12-13 NOTE — PROGRESS NOTE ADULT - SUBJECTIVE AND OBJECTIVE BOX
Patient is a 63y Male whom presented to the hospital with ckd and dena   pateint seen and examined nad , no fever , no chills      PAST MEDICAL & SURGICAL HISTORY:  GIB (gastrointestinal bleeding)  GERD with esophagitis: Gastritis &amp; Non Bleeding Ulcers  Hepatic encephalopathy  Obesity  Fatty liver disease, nonalcoholic  Renal stones: 25 years ago  Hypertension  Neuropathy  Hypercholesteremia  Diabetes  S/P cholecystectomy      MEDICATIONS  (STANDING):  dextrose 5%. 1000 milliLiter(s) (50 mL/Hr) IV Continuous <Continuous>  dextrose 50% Injectable 12.5 Gram(s) IV Push once  dextrose 50% Injectable 25 Gram(s) IV Push once  dextrose 50% Injectable 25 Gram(s) IV Push once  insulin lispro (HumaLOG) corrective regimen sliding scale   SubCutaneous three times a day before meals  insulin lispro (HumaLOG) corrective regimen sliding scale   SubCutaneous at bedtime      Allergies    codeine (Anaphylaxis)    Intolerances    NO  RED MEAT (Unknown)                                     SOCIAL HISTORY:  Denies ETOh,Smoking,     FAMILY HISTORY:  Family history of type 2 diabetes mellitus  Family history of hypertension  Family history of stomach cancer      REVIEW OF SYSTEMS:    CONSTITUTIONAL: No weakness, fevers or chills  RESPIRATORY: No cough, wheezing, hemoptysis; No shortness of breath  CARDIOVASCULAR: No chest pain or palpitations  GASTROINTESTINAL: No abdominal or epigastric pain. No nausea, vomiting,     No diarrhea or constipation. No melena   GENITOURINARY: No dysuria, frequency or hematuria  NEUROLOGICAL: No numbness or weakness  SKIN: dry                           7.7    3.99  )-----------( 39       ( 13 Dec 2019 08:28 )             22.8       CBC Full  -  ( 13 Dec 2019 08:28 )  WBC Count : 3.99 K/uL  RBC Count : 2.34 M/uL  Hemoglobin : 7.7 g/dL  Hematocrit : 22.8 %  Platelet Count - Automated : 39 K/uL  Mean Cell Volume : 97.4 fl  Mean Cell Hemoglobin : 32.9 pg  Mean Cell Hemoglobin Concentration : 33.8 gm/dL  Auto Neutrophil # : 1.82 K/uL  Auto Lymphocyte # : 1.46 K/uL  Auto Monocyte # : 0.48 K/uL  Auto Eosinophil # : 0.18 K/uL  Auto Basophil # : 0.04 K/uL  Auto Neutrophil % : 45.6 %  Auto Lymphocyte % : 36.6 %  Auto Monocyte % : 12.0 %  Auto Eosinophil % : 4.5 %  Auto Basophil % : 1.0 %      12-13    134<L>  |  97  |  30<H>  ----------------------------<  136<H>  3.7   |  27  |  1.27    Ca    10.6<H>      13 Dec 2019 08:30  Phos  2.3     12-12    TPro  5.9<L>  /  Alb  3.7  /  TBili  8.8<H>  /  DBili  x   /  AST  31  /  ALT  18  /  AlkPhos  104  12-13      CAPILLARY BLOOD GLUCOSE      POCT Blood Glucose.: 197 mg/dL (13 Dec 2019 16:54)  POCT Blood Glucose.: 190 mg/dL (13 Dec 2019 12:21)  POCT Blood Glucose.: 148 mg/dL (13 Dec 2019 08:31)  POCT Blood Glucose.: 164 mg/dL (12 Dec 2019 21:39)      Vital Signs Last 24 Hrs  T(C): 36.7 (13 Dec 2019 14:51), Max: 36.8 (13 Dec 2019 04:19)  T(F): 98.1 (13 Dec 2019 14:51), Max: 98.2 (13 Dec 2019 04:19)  HR: 69 (13 Dec 2019 04:19) (69 - 80)  BP: 116/62 (13 Dec 2019 04:19) (116/62 - 123/66)  BP(mean): --  RR: 18 (13 Dec 2019 14:51) (18 - 18)  SpO2: 99% (13 Dec 2019 14:51) (95% - 99%)        PT/INR - ( 13 Dec 2019 08:30 )   PT: 26.3 sec;   INR: 2.23 ratio         PTT - ( 13 Dec 2019 08:30 )  PTT:52.5 sec       PHYSICAL EXAM:    Constitutional: NAD  HEENT: conjunctive   clear   Neck:  No JVD  Respiratory: CTAB  Cardiovascular: S1 and S2  Gastrointestinal: BS+, soft, NT/ND  Extremities: No peripheral edema  Neurological:  no focal deficits  Psychiatric: Normal mood, normal affect  Skin: dry   Access: Not applicable

## 2019-12-13 NOTE — PROGRESS NOTE ADULT - ASSESSMENT
63M w/ pmh HTN, HLD, DM, non-alcoholic cirrhosis, chronic thrombocytopenia -- sent from rehab for worsening jaundice, dizzy/weakness/falls x 4 days.  h/o strep bacteremia 10/19 s/p RX  Was being observed off abx  12/6 urine Cx(unclear whys ent) with E coli and kleb-ESBL  Patient does not provide reliable ROS  No bob  No other systemic symptoms though ? confused/encephalopathic  ? colonization/contamination vs UTI

## 2019-12-13 NOTE — PROGRESS NOTE ADULT - PROBLEM SELECTOR PLAN 1
Clinically better  Denies symptoms  Repeat urine Cx(though sent prior to invanz) with [id number] GNR  Await ID sensi  Continue Invanz for now  If sensitive can Dc antimicrobials in 2 days  Monitor for s/s any complication or other nosocomial foci

## 2019-12-13 NOTE — PROGRESS NOTE ADULT - PROBLEM SELECTOR PLAN 3
improving  Unclear if componenet from UTI  Continue invanz as above  Other plan per primary/hepatology

## 2019-12-13 NOTE — PROGRESS NOTE ADULT - SUBJECTIVE AND OBJECTIVE BOX
Patient is a 64y old  Male who presents with a chief complaint of difficulty to ambulate/ weakness (13 Dec 2019 07:51)    Being followed by ID for UTI     Interval history:feels well   denies urinary complaints   No acute events      ROS:  No cough,SOB,CP  No N/V/D./abd pain  No other complaints      Antimicrobials:    ertapenem  IVPB      ertapenem  IVPB 1000 milliGRAM(s) IV Intermittent every 24 hours  rifAXIMin 550 milliGRAM(s) Oral two times a day    Other medications reviewed    Vital Signs Last 24 Hrs  T(C): 36.8 (12-13-19 @ 04:19), Max: 36.8 (12-13-19 @ 04:19)  T(F): 98.2 (12-13-19 @ 04:19), Max: 98.2 (12-13-19 @ 04:19)  HR: 69 (12-13-19 @ 04:19) (69 - 80)  BP: 116/62 (12-13-19 @ 04:19) (116/62 - 123/66)  BP(mean): --  RR: 18 (12-13-19 @ 04:19) (18 - 18)  SpO2: 97% (12-13-19 @ 04:19) (95% - 97%)    Physical Exam:        HEENT PERRLA    No oral exudate or erythema    Chest Good AE,CTA    CVS RRR S1 S2 WNl No murmur or rub or gallop    Abd soft BS normal No tenderness no masses    IV site no erythema tenderness or discharge  + FF    No bob    LE stasis       CNS confused as above    Lab Data:                          7.7    3.99  )-----------( 39       ( 13 Dec 2019 08:28 )             22.8       12-13    134<L>  |  97  |  30<H>  ----------------------------<  136<H>  3.7   |  27  |  1.27    Ca    10.6<H>      13 Dec 2019 08:30  Phos  2.3     12-12    TPro  5.9<L>  /  Alb  3.7  /  TBili  8.8<H>  /  DBili  x   /  AST  31  /  ALT  18  /  AlkPhos  104  12-13        Culture - Urine (collected 10 Dec 2019 23:06)  Source: .Urine Clean Catch (Midstream)  Preliminary Report (13 Dec 2019 12:24):    >100,000 CFU/ml Gram Negative Rods    Identification and susceptibility to follow.          < from: MR Abdomen w/wo IV Cont (12.06.19 @ 13:46) >    IMPRESSION:     Cirrhosis with evidence of portal hypertension.    No focal hepatic lesion.    Standard hepatic arterial anatomy with widely patent celiac axis and SMA.   SMV and portal veins are patent as above.          < end of copied text >

## 2019-12-14 LAB
-  AMIKACIN: SIGNIFICANT CHANGE UP
-  AMIKACIN: SIGNIFICANT CHANGE UP
-  AMPICILLIN/SULBACTAM: SIGNIFICANT CHANGE UP
-  AMPICILLIN/SULBACTAM: SIGNIFICANT CHANGE UP
-  AMPICILLIN: SIGNIFICANT CHANGE UP
-  AMPICILLIN: SIGNIFICANT CHANGE UP
-  AZTREONAM: SIGNIFICANT CHANGE UP
-  AZTREONAM: SIGNIFICANT CHANGE UP
-  CEFAZOLIN: SIGNIFICANT CHANGE UP
-  CEFAZOLIN: SIGNIFICANT CHANGE UP
-  CEFEPIME: SIGNIFICANT CHANGE UP
-  CEFEPIME: SIGNIFICANT CHANGE UP
-  CEFOXITIN: SIGNIFICANT CHANGE UP
-  CEFOXITIN: SIGNIFICANT CHANGE UP
-  CEFTRIAXONE: SIGNIFICANT CHANGE UP
-  CEFTRIAXONE: SIGNIFICANT CHANGE UP
-  CIPROFLOXACIN: SIGNIFICANT CHANGE UP
-  CIPROFLOXACIN: SIGNIFICANT CHANGE UP
-  ERTAPENEM: SIGNIFICANT CHANGE UP
-  GENTAMICIN: SIGNIFICANT CHANGE UP
-  GENTAMICIN: SIGNIFICANT CHANGE UP
-  IMIPENEM: SIGNIFICANT CHANGE UP
-  IMIPENEM: SIGNIFICANT CHANGE UP
-  LEVOFLOXACIN: SIGNIFICANT CHANGE UP
-  LEVOFLOXACIN: SIGNIFICANT CHANGE UP
-  MEROPENEM: SIGNIFICANT CHANGE UP
-  MEROPENEM: SIGNIFICANT CHANGE UP
-  NITROFURANTOIN: SIGNIFICANT CHANGE UP
-  NITROFURANTOIN: SIGNIFICANT CHANGE UP
-  PIPERACILLIN/TAZOBACTAM: SIGNIFICANT CHANGE UP
-  PIPERACILLIN/TAZOBACTAM: SIGNIFICANT CHANGE UP
-  TIGECYCLINE: SIGNIFICANT CHANGE UP
-  TIGECYCLINE: SIGNIFICANT CHANGE UP
-  TOBRAMYCIN: SIGNIFICANT CHANGE UP
-  TOBRAMYCIN: SIGNIFICANT CHANGE UP
-  TRIMETHOPRIM/SULFAMETHOXAZOLE: SIGNIFICANT CHANGE UP
-  TRIMETHOPRIM/SULFAMETHOXAZOLE: SIGNIFICANT CHANGE UP
ALBUMIN SERPL ELPH-MCNC: 3.2 G/DL — LOW (ref 3.3–5)
ALP SERPL-CCNC: 111 U/L — SIGNIFICANT CHANGE UP (ref 40–120)
ALT FLD-CCNC: 20 U/L — SIGNIFICANT CHANGE UP (ref 10–45)
ANION GAP SERPL CALC-SCNC: 11 MMOL/L — SIGNIFICANT CHANGE UP (ref 5–17)
ANISOCYTOSIS BLD QL: SLIGHT — SIGNIFICANT CHANGE UP
AST SERPL-CCNC: 27 U/L — SIGNIFICANT CHANGE UP (ref 10–40)
BASOPHILS # BLD AUTO: 0.04 K/UL — SIGNIFICANT CHANGE UP (ref 0–0.2)
BASOPHILS NFR BLD AUTO: 1 % — SIGNIFICANT CHANGE UP (ref 0–2)
BILIRUB SERPL-MCNC: 8.2 MG/DL — HIGH (ref 0.2–1.2)
BUN SERPL-MCNC: 27 MG/DL — HIGH (ref 7–23)
CALCIUM SERPL-MCNC: 10.5 MG/DL — SIGNIFICANT CHANGE UP (ref 8.4–10.5)
CHLORIDE SERPL-SCNC: 95 MMOL/L — LOW (ref 96–108)
CO2 SERPL-SCNC: 25 MMOL/L — SIGNIFICANT CHANGE UP (ref 22–31)
CREAT SERPL-MCNC: 1.23 MG/DL — SIGNIFICANT CHANGE UP (ref 0.5–1.3)
CULTURE RESULTS: SIGNIFICANT CHANGE UP
EOSINOPHIL # BLD AUTO: 0.14 K/UL — SIGNIFICANT CHANGE UP (ref 0–0.5)
EOSINOPHIL NFR BLD AUTO: 3.6 % — SIGNIFICANT CHANGE UP (ref 0–6)
GLUCOSE BLDC GLUCOMTR-MCNC: 180 MG/DL — HIGH (ref 70–99)
GLUCOSE BLDC GLUCOMTR-MCNC: 223 MG/DL — HIGH (ref 70–99)
GLUCOSE BLDC GLUCOMTR-MCNC: 224 MG/DL — HIGH (ref 70–99)
GLUCOSE BLDC GLUCOMTR-MCNC: 230 MG/DL — HIGH (ref 70–99)
GLUCOSE SERPL-MCNC: 195 MG/DL — HIGH (ref 70–99)
HCT VFR BLD CALC: 23.7 % — LOW (ref 39–50)
HGB BLD-MCNC: 8 G/DL — LOW (ref 13–17)
HYPOCHROMIA BLD QL: SLIGHT — SIGNIFICANT CHANGE UP
IMM GRANULOCYTES NFR BLD AUTO: 0.3 % — SIGNIFICANT CHANGE UP (ref 0–1.5)
LYMPHOCYTES # BLD AUTO: 1.3 K/UL — SIGNIFICANT CHANGE UP (ref 1–3.3)
LYMPHOCYTES # BLD AUTO: 33.8 % — SIGNIFICANT CHANGE UP (ref 13–44)
MACROCYTES BLD QL: SLIGHT — SIGNIFICANT CHANGE UP
MANUAL SMEAR VERIFICATION: SIGNIFICANT CHANGE UP
MCHC RBC-ENTMCNC: 33.1 PG — SIGNIFICANT CHANGE UP (ref 27–34)
MCHC RBC-ENTMCNC: 33.8 GM/DL — SIGNIFICANT CHANGE UP (ref 32–36)
MCV RBC AUTO: 97.9 FL — SIGNIFICANT CHANGE UP (ref 80–100)
METHOD TYPE: SIGNIFICANT CHANGE UP
METHOD TYPE: SIGNIFICANT CHANGE UP
MONOCYTES # BLD AUTO: 0.47 K/UL — SIGNIFICANT CHANGE UP (ref 0–0.9)
MONOCYTES NFR BLD AUTO: 12.2 % — SIGNIFICANT CHANGE UP (ref 2–14)
NEUTROPHILS # BLD AUTO: 1.89 K/UL — SIGNIFICANT CHANGE UP (ref 1.8–7.4)
NEUTROPHILS NFR BLD AUTO: 49.1 % — SIGNIFICANT CHANGE UP (ref 43–77)
ORGANISM # SPEC MICROSCOPIC CNT: SIGNIFICANT CHANGE UP
PLAT MORPH BLD: NORMAL — SIGNIFICANT CHANGE UP
PLATELET # BLD AUTO: 40 K/UL — LOW (ref 150–400)
POIKILOCYTOSIS BLD QL AUTO: SLIGHT — SIGNIFICANT CHANGE UP
POTASSIUM SERPL-MCNC: 3.6 MMOL/L — SIGNIFICANT CHANGE UP (ref 3.5–5.3)
POTASSIUM SERPL-SCNC: 3.6 MMOL/L — SIGNIFICANT CHANGE UP (ref 3.5–5.3)
PROT SERPL-MCNC: 5.7 G/DL — LOW (ref 6–8.3)
RBC # BLD: 2.42 M/UL — LOW (ref 4.2–5.8)
RBC # FLD: 18.4 % — HIGH (ref 10.3–14.5)
RBC BLD AUTO: ABNORMAL
SODIUM SERPL-SCNC: 131 MMOL/L — LOW (ref 135–145)
SPECIMEN SOURCE: SIGNIFICANT CHANGE UP
WBC # BLD: 3.85 K/UL — SIGNIFICANT CHANGE UP (ref 3.8–10.5)
WBC # FLD AUTO: 3.85 K/UL — SIGNIFICANT CHANGE UP (ref 3.8–10.5)

## 2019-12-14 PROCEDURE — 99232 SBSQ HOSP IP/OBS MODERATE 35: CPT

## 2019-12-14 PROCEDURE — 99232 SBSQ HOSP IP/OBS MODERATE 35: CPT | Mod: GC

## 2019-12-14 RX ORDER — SODIUM CHLORIDE 9 MG/ML
2 INJECTION INTRAMUSCULAR; INTRAVENOUS; SUBCUTANEOUS THREE TIMES A DAY
Refills: 0 | Status: COMPLETED | OUTPATIENT
Start: 2019-12-14 | End: 2019-12-15

## 2019-12-14 RX ADMIN — ERTAPENEM SODIUM 120 MILLIGRAM(S): 1 INJECTION, POWDER, LYOPHILIZED, FOR SOLUTION INTRAMUSCULAR; INTRAVENOUS at 17:19

## 2019-12-14 RX ADMIN — SODIUM CHLORIDE 2 GRAM(S): 9 INJECTION INTRAMUSCULAR; INTRAVENOUS; SUBCUTANEOUS at 21:08

## 2019-12-14 RX ADMIN — Medication 1 GRAM(S): at 17:12

## 2019-12-14 RX ADMIN — LACTULOSE 30 GRAM(S): 10 SOLUTION ORAL at 09:13

## 2019-12-14 RX ADMIN — Medication 2: at 18:25

## 2019-12-14 RX ADMIN — ATORVASTATIN CALCIUM 10 MILLIGRAM(S): 80 TABLET, FILM COATED ORAL at 21:09

## 2019-12-14 RX ADMIN — Medication 2: at 12:00

## 2019-12-14 RX ADMIN — PANTOPRAZOLE SODIUM 40 MILLIGRAM(S): 20 TABLET, DELAYED RELEASE ORAL at 06:04

## 2019-12-14 RX ADMIN — LACTULOSE 30 GRAM(S): 10 SOLUTION ORAL at 02:23

## 2019-12-14 RX ADMIN — Medication 1 DROP(S): at 13:32

## 2019-12-14 RX ADMIN — LACTULOSE 30 GRAM(S): 10 SOLUTION ORAL at 06:04

## 2019-12-14 RX ADMIN — Medication 1 GRAM(S): at 06:04

## 2019-12-14 RX ADMIN — MIDODRINE HYDROCHLORIDE 20 MILLIGRAM(S): 2.5 TABLET ORAL at 06:04

## 2019-12-14 RX ADMIN — INSULIN GLARGINE 28 UNIT(S): 100 INJECTION, SOLUTION SUBCUTANEOUS at 22:30

## 2019-12-14 RX ADMIN — LACTULOSE 30 GRAM(S): 10 SOLUTION ORAL at 13:32

## 2019-12-14 RX ADMIN — Medication 1 DROP(S): at 21:07

## 2019-12-14 RX ADMIN — Medication 8 UNIT(S): at 16:25

## 2019-12-14 RX ADMIN — Medication 8 UNIT(S): at 07:00

## 2019-12-14 RX ADMIN — Medication 8 UNIT(S): at 12:00

## 2019-12-14 RX ADMIN — MIDODRINE HYDROCHLORIDE 20 MILLIGRAM(S): 2.5 TABLET ORAL at 11:38

## 2019-12-14 RX ADMIN — Medication 1: at 09:11

## 2019-12-14 RX ADMIN — TAMSULOSIN HYDROCHLORIDE 0.4 MILLIGRAM(S): 0.4 CAPSULE ORAL at 21:08

## 2019-12-14 RX ADMIN — MIDODRINE HYDROCHLORIDE 20 MILLIGRAM(S): 2.5 TABLET ORAL at 17:10

## 2019-12-14 NOTE — PROGRESS NOTE ADULT - PROBLEM SELECTOR PLAN 1
serum creatinine is 1.23   , approximating GFR is increased    ml/min.   There is  progression . No uremic symptoms    pos evidence of anemia .  Fluid status stable.  Will continue to avoid nephrotoxic drugs.  Patient remains asymptomatic.   Continue current therapy.

## 2019-12-14 NOTE — PROGRESS NOTE ADULT - SUBJECTIVE AND OBJECTIVE BOX
Patient is a 63y Male whom presented to the hospital with ckd and dena   pateint seen and examined nad , no fever , no chills      PAST MEDICAL & SURGICAL HISTORY:  GIB (gastrointestinal bleeding)  GERD with esophagitis: Gastritis &amp; Non Bleeding Ulcers  Hepatic encephalopathy  Obesity  Fatty liver disease, nonalcoholic  Renal stones: 25 years ago  Hypertension  Neuropathy  Hypercholesteremia  Diabetes  S/P cholecystectomy      MEDICATIONS  (STANDING):  dextrose 5%. 1000 milliLiter(s) (50 mL/Hr) IV Continuous <Continuous>  dextrose 50% Injectable 12.5 Gram(s) IV Push once  dextrose 50% Injectable 25 Gram(s) IV Push once  dextrose 50% Injectable 25 Gram(s) IV Push once  insulin lispro (HumaLOG) corrective regimen sliding scale   SubCutaneous three times a day before meals  insulin lispro (HumaLOG) corrective regimen sliding scale   SubCutaneous at bedtime      Allergies    codeine (Anaphylaxis)    Intolerances    NO  RED MEAT (Unknown)                                     SOCIAL HISTORY:  Denies ETOh,Smoking,     FAMILY HISTORY:  Family history of type 2 diabetes mellitus  Family history of hypertension  Family history of stomach cancer      REVIEW OF SYSTEMS:    CONSTITUTIONAL: No weakness, fevers or chills  RESPIRATORY: No cough, wheezing, hemoptysis; No shortness of breath  CARDIOVASCULAR: No chest pain or palpitations  GASTROINTESTINAL: No abdominal or epigastric pain. No nausea, vomiting,     No diarrhea or constipation. No melena   GENITOURINARY: No dysuria, frequency or hematuria  NEUROLOGICAL: No numbness or weakness  SKIN: dry                                        8.0    3.85  )-----------( 40       ( 14 Dec 2019 11:06 )             23.7       CBC Full  -  ( 14 Dec 2019 11:06 )  WBC Count : 3.85 K/uL  RBC Count : 2.42 M/uL  Hemoglobin : 8.0 g/dL  Hematocrit : 23.7 %  Platelet Count - Automated : 40 K/uL  Mean Cell Volume : 97.9 fl  Mean Cell Hemoglobin : 33.1 pg  Mean Cell Hemoglobin Concentration : 33.8 gm/dL  Auto Neutrophil # : 1.89 K/uL  Auto Lymphocyte # : 1.30 K/uL  Auto Monocyte # : 0.47 K/uL  Auto Eosinophil # : 0.14 K/uL  Auto Basophil # : 0.04 K/uL  Auto Neutrophil % : 49.1 %  Auto Lymphocyte % : 33.8 %  Auto Monocyte % : 12.2 %  Auto Eosinophil % : 3.6 %  Auto Basophil % : 1.0 %      12-14    131<L>  |  95<L>  |  27<H>  ----------------------------<  195<H>  3.6   |  25  |  1.23    Ca    10.5      14 Dec 2019 06:18    TPro  5.7<L>  /  Alb  3.2<L>  /  TBili  8.2<H>  /  DBili  x   /  AST  27  /  ALT  20  /  AlkPhos  111  12-14      CAPILLARY BLOOD GLUCOSE      POCT Blood Glucose.: 230 mg/dL (14 Dec 2019 17:56)  POCT Blood Glucose.: 224 mg/dL (14 Dec 2019 13:13)  POCT Blood Glucose.: 180 mg/dL (14 Dec 2019 08:54)  POCT Blood Glucose.: 200 mg/dL (13 Dec 2019 21:56)      Vital Signs Last 24 Hrs  T(C): 36.8 (14 Dec 2019 12:43), Max: 36.8 (14 Dec 2019 12:43)  T(F): 98.3 (14 Dec 2019 12:43), Max: 98.3 (14 Dec 2019 12:43)  HR: 75 (14 Dec 2019 12:43) (74 - 79)  BP: 98/62 (14 Dec 2019 12:43) (98/62 - 123/69)  BP(mean): --  RR: 18 (14 Dec 2019 12:43) (17 - 19)  SpO2: 94% (14 Dec 2019 12:43) (94% - 97%)        PT/INR - ( 13 Dec 2019 08:30 )   PT: 26.3 sec;   INR: 2.23 ratio         PTT - ( 13 Dec 2019 08:30 )  PTT:52.5 sec     PHYSICAL EXAM:    Constitutional: NAD  HEENT: conjunctive   clear   Neck:  No JVD  Respiratory: CTAB  Cardiovascular: S1 and S2  Gastrointestinal: BS+, soft, NT/ND  Extremities: No peripheral edema  Neurological:  no focal deficits  Psychiatric: Normal mood, normal affect  Skin: dry   Access: Not applicable

## 2019-12-14 NOTE — PROGRESS NOTE ADULT - SUBJECTIVE AND OBJECTIVE BOX
Chief Complaint: Weakness    Interval Events:   - The patient has no new complaints this morning  - He states he was walking in the hallway with assistance yesterday   - He otherwise denies nausea/vomiting, abdominal pain, and fevers/chills.  - Patient underwent TTE on 12/13/19    Allergies:  codeine (Anaphylaxis)  NO  RED MEAT (Unknown)    MEDICATIONS  (STANDING):  artificial  tears Solution 1 Drop(s) Both EYES three times a day  atorvastatin 10 milliGRAM(s) Oral at bedtime  dextrose 5%. 1000 milliLiter(s) (50 mL/Hr) IV Continuous <Continuous>  dextrose 50% Injectable 12.5 Gram(s) IV Push once  dextrose 50% Injectable 25 Gram(s) IV Push once  dextrose 50% Injectable 25 Gram(s) IV Push once  ertapenem  IVPB      ertapenem  IVPB 1000 milliGRAM(s) IV Intermittent every 24 hours  insulin glargine Injectable (LANTUS) 28 Unit(s) SubCutaneous at bedtime  insulin lispro (HumaLOG) corrective regimen sliding scale   SubCutaneous three times a day before meals  insulin lispro (HumaLOG) corrective regimen sliding scale   SubCutaneous at bedtime  insulin lispro Injectable (HumaLOG) 8 Unit(s) SubCutaneous three times a day before meals  lactulose Syrup 30 Gram(s) Oral every 4 hours  midodrine. 20 milliGRAM(s) Oral three times a day  pantoprazole    Tablet 40 milliGRAM(s) Oral before breakfast  rifAXIMin 550 milliGRAM(s) Oral two times a day  sucralfate 1 Gram(s) Oral two times a day  tamsulosin 0.4 milliGRAM(s) Oral at bedtime    MEDICATIONS  (PRN):  dextrose 40% Gel 15 Gram(s) Oral once PRN Blood Glucose LESS THAN 70 milliGRAM(s)/deciliter  glucagon  Injectable 1 milliGRAM(s) IntraMuscular once PRN Glucose LESS THAN 70 milligrams/deciliter    PMHX/PSHX:  GIB (gastrointestinal bleeding)  GERD with esophagitis  Hepatic encephalopathy  Obesity  Fatty liver disease, nonalcoholic  Renal stones  Hypertension  Neuropathy  Hypercholesteremia  Diabetes  S/P cholecystectomy    Family history:  Family history of type 2 diabetes mellitus  Family history of hypertension  Family history of stomach cancer    ROS:     General:  No wt loss, fevers, chills, night sweats, + fatigue  Eyes:  Good vision, no reported pain  ENT:  No sore throat, pain, runny nose, dysphagia  CV:  No pain, palpitations, hypo/hypertension  Pulm:  No dyspnea, cough, tachypnea, wheezing  GI:  No pain, No nausea, No vomiting, No diarrhea, No constipation, No weight loss, No fever, No pruritis, No rectal bleeding, No tarry stools, No dysphagia  :  No pain, bleeding, incontinence, nocturia  Muscle:  No pain, weakness  Neuro:  No weakness, tingling, memory problems  Psych:  No fatigue, insomnia, mood problems, depression  Endocrine:  No polyuria, polydipsia, cold/heat intolerance  Heme:  No petechiae, ecchymosis, easy bruisability  Skin:  No rash, tattoos, scars, edema    PHYSICAL EXAM:   Vital Signs:  Vital Signs Last 24 Hrs  T(C): 36.4 (14 Dec 2019 05:16), Max: 36.7 (13 Dec 2019 14:51)  T(F): 97.5 (14 Dec 2019 05:16), Max: 98.1 (13 Dec 2019 14:51)  HR: 76 (14 Dec 2019 05:16) (74 - 79)  BP: 101/53 (14 Dec 2019 05:16) (101/53 - 123/69)  BP(mean): --  RR: 18 (14 Dec 2019 05:16) (17 - 19)  SpO2: 97% (14 Dec 2019 05:16) (95% - 99%)    GENERAL:  No acute distress obese  HEENT:  Normocephalic/atraumatic,  no scleral icterus  CHEST:  Normal effort, no accessory muscle use  ABDOMEN:  Soft, non-tender, non-distended, normoactive bowel sounds  EXTREMITIES:  No cyanosis, clubbing, or edema  SKIN:  No rash/erythema  NEURO:  Alert and oriented x 2 to 3, + asterixis    LABS:                        7.7    3.99  )-----------( 39       ( 13 Dec 2019 08:28 )             22.8       12-14    131<L>  |  95<L>  |  27<H>  ----------------------------<  195<H>  3.6   |  25  |  1.23    Ca    10.5      14 Dec 2019 06:18    TPro  5.7<L>  /  Alb  3.2<L>  /  TBili  8.2<H>  /  DBili  x   /  AST  27  /  ALT  20  /  AlkPhos  111  12-14    LIVER FUNCTIONS - ( 14 Dec 2019 06:18 )  Alb: 3.2 g/dL / Pro: 5.7 g/dL / ALK PHOS: 111 U/L / ALT: 20 U/L / AST: 27 U/L / GGT: x           PT/INR - ( 13 Dec 2019 08:30 )   PT: 26.3 sec;   INR: 2.23 ratio      PTT - ( 13 Dec 2019 08:30 )  PTT:52.5 sec               7.7    3.99  )-----------( 39       ( 13 Dec 2019 08:28 )             22.8                         8.2    3.99  )-----------( 39       ( 12 Dec 2019 08:16 )             24.0     Imaging:    < from: TTE with Doppler (w/Cont) (12.13.19 @ 13:27) >    Patient name: JESSICA MARTIN  YOB: 1955   Age: 64 (M)   MR#: [id number]  Study Date: 12/13/2019  Location: [address] [place], [place] [id number]: Lucille Sanchez RDCS  Study quality: Technically difficult  Referring Physician: Neeraj Prado MD  Blood Pressure: 116/62 mmHg  Height: 185 cm  Weight: 115 kg  BSA: 2.4 m2  ------------------------------------------------------------------------  PROCEDURE: Transthoracic echocardiogram with 2-D, M-Mode  and complete spectral and color flow Doppler. Verbal  consent was obtained for injection of  Ultrasonic Enhancing  Agent following a discussion of risks and benefits.  Following intravenous injection of Ultrasonic Enhancing  Agent , harmonic imaging was performed.  INDICATION: Encounter for otherpreprocedural examination  (Z01.818)  ------------------------------------------------------------------------  Dimensions:    Normal Values:  LA:     4.7    2.0 - 4.0 cm  Ao:     3.6    2.0 - 3.8 cm  SEPTUM: 1.1    0.6 - 1.2 cm  PWT:    1.0    0.6 -1.1 cm  LVIDd:  5.5    3.0 - 5.6 cm  LVIDs:  3.3    1.8 - 4.0 cm  Derived variables:  LVMI: 96 g/m2  RWT: 0.36  Fractional short: 40 %  EF (Teicholtz): 70 %  Doppler Peak Velocity (m/sec): AoV=1.8  ------------------------------------------------------------------------  Observations:  Mitral Valve: Mitral annular calcification, otherwise  normal mitral valve.  Aortic Valve/Aorta: Calcified aortic valve. Peak  transaortic valve gradient equals 13 mm Hg. Peak left  ventricular outflow tract gradient equals 4 mm Hg.  Aortic Root: 3.6 cm.  LVOT diameter: 2.6 cm.  Left Atrium: Moderate left atrial enlargement.  Left Ventricle: Endocardial visualization enhanced with  intravenous injection of Ultrasonic Enhancing Agent  (Definity). Hyperdynamic left ventricular systolic  function.  Normal left ventricular internal dimensions and  wall thicknesses. Normal diastolic function.  Right Heart: Normal right atrium. The right ventricle is  not well visualized; grossly normal right ventricular  systolic function. Normal tricuspid valve. Minimal  tricuspid regurgitation. Normal pulmonic valve.  Pericardium/Pleura: Normal pericardium with no pericardial  effusion.  Hemodynamic: Estimated right atrial pressure is 8 mm Hg.  Estimated right ventricular systolic pressure equals 32 mm  Hg, assuming right atrial pressure equals 8 mm Hg,  consistent with normal pulmonary pressures.  ------------------------------------------------------------------------  Conclusions:  1. Moderate left atrial enlargement.  2. Normal left ventricular internal dimensions and wall  thicknesses.  3. Endocardial visualization enhanced with intravenous  injection of Ultrasonic Enhancing Agent (Definity).  Hyperdynamic left ventricular systolic function.  4. Normal diastolic function.  5. The right ventricle is not well visualized; grossly  normal right ventricular systolic function.  *** No previous Echo exam.  ------------------------------------------------------------------------  Confirmed on  12/13/2019 - 16:23:05 by Mark Conway M.D.  ------------------------------------------------------------------------    < end of copied text >

## 2019-12-14 NOTE — PROGRESS NOTE ADULT - SUBJECTIVE AND OBJECTIVE BOX
INTERVAL HPI/OVERNIGHT EVENTS:  No new overnight event.  No N/V/D.  Tolerating diet.  less confused    Allergies    codeine (Anaphylaxis)    Intolerances    NO  RED MEAT (Unknown)    General:  No wt loss, fevers, chills, night sweats, fatigue,   Eyes:  Good vision, no reported pain  ENT:  No sore throat, pain, runny nose, dysphagia  CV:  No pain, palpitations, hypo/hypertension  Resp:  No dyspnea, cough, tachypnea, wheezing  GI:  No pain, No nausea, No vomiting, No diarrhea, No constipation, No weight loss, No fever, No pruritis, No rectal bleeding, No tarry stools, No dysphagia,  :  No pain, bleeding, incontinence, nocturia  Muscle:  No pain, weakness  Neuro:  No weakness, tingling, memory problems  Psych:  No fatigue, insomnia, mood problems, depression  Endocrine:  No polyuria, polydipsia, cold/heat intolerance  Heme:  No petechiae, ecchymosis, easy bruisability  Skin:  No rash, tattoos, scars, edema      PHYSICAL EXAM:   Vital Signs:  Vital Signs Last 24 Hrs  T(C): 36.4 (14 Dec 2019 05:16), Max: 36.7 (13 Dec 2019 14:51)  T(F): 97.5 (14 Dec 2019 05:16), Max: 98.1 (13 Dec 2019 14:51)  HR: 76 (14 Dec 2019 05:16) (74 - 79)  BP: 101/53 (14 Dec 2019 05:16) (101/53 - 123/69)  BP(mean): --  RR: 18 (14 Dec 2019 05:16) (17 - 19)  SpO2: 97% (14 Dec 2019 05:16) (95% - 99%)  Daily     Daily I&O's Summary    13 Dec 2019 07:01  -  14 Dec 2019 07:00  --------------------------------------------------------  IN: 120 mL / OUT: 800 mL / NET: -680 mL    14 Dec 2019 07:01  -  14 Dec 2019 12:19  --------------------------------------------------------  IN: 120 mL / OUT: 600 mL / NET: -480 mL        GENERAL:  Appears stated age, well-groomed, well-nourished, no distress  HEENT:  NC/AT,  conjunctivae clear and pink, no thyromegaly, nodules, adenopathy, no JVD, sclera -anicteric  CHEST:  Full & symmetric excursion, no increased effort, breath sounds clear  HEART:  Regular rhythm, S1, S2, no murmur/rub/S3/S4, no abdominal bruit, no edema  ABDOMEN:  Soft, non-tender, non-distended, normoactive bowel sounds,  no masses ,no hepato-splenomegaly, no signs of chronic liver disease  EXTEREMITIES:  no cyanosis,clubbing or edema  SKIN:  No rash/erythema/ecchymoses/petechiae/wounds/abscess/warm/dry  NEURO:  Alert, oriented, no asterixis, no tremor, no encephalopathy      LABS:                        8.0    3.85  )-----------( 40       ( 14 Dec 2019 11:06 )             23.7     12-14    131<L>  |  95<L>  |  27<H>  ----------------------------<  195<H>  3.6   |  25  |  1.23    Ca    10.5      14 Dec 2019 06:18    TPro  5.7<L>  /  Alb  3.2<L>  /  TBili  8.2<H>  /  DBili  x   /  AST  27  /  ALT  20  /  AlkPhos  111  12-14    PT/INR - ( 13 Dec 2019 08:30 )   PT: 26.3 sec;   INR: 2.23 ratio         PTT - ( 13 Dec 2019 08:30 )  PTT:52.5 sec    amylase   lipase  RADIOLOGY & ADDITIONAL TESTS:

## 2019-12-14 NOTE — PROGRESS NOTE ADULT - SUBJECTIVE AND OBJECTIVE BOX
Patient is a 64y old  Male who presents with a chief complaint of Weakness (14 Dec 2019 08:48)    Being followed by ID for UTI     Interval history:feels well  denies any dysuria or abd pain   No acute events      ROS:  No cough,SOB,CP  No N/V/D./abd pain  No other complaints      Antimicrobials:    ertapenem  IVPB      ertapenem  IVPB 1000 milliGRAM(s) IV Intermittent every 24 hours  rifAXIMin 550 milliGRAM(s) Oral two times a day    Other medications reviewed    Vital Signs Last 24 Hrs  T(C): 36.4 (12-14-19 @ 05:16), Max: 36.7 (12-13-19 @ 14:51)  T(F): 97.5 (12-14-19 @ 05:16), Max: 98.1 (12-13-19 @ 14:51)  HR: 76 (12-14-19 @ 05:16) (74 - 79)  BP: 101/53 (12-14-19 @ 05:16) (101/53 - 123/69)  BP(mean): --  RR: 18 (12-14-19 @ 05:16) (17 - 19)  SpO2: 97% (12-14-19 @ 05:16) (95% - 99%)    Physical Exam:      HEENT PERRLA    No oral exudate or erythema    Chest Good AE,CTA    CVS RRR S1 S2 WNl No murmur or rub or gallop    Abd soft BS normal No tenderness no masses    IV site no erythema tenderness or discharge  + FF    No bob    LE stasis       CNS AAOX 2 no focal  Lab Data:                          7.7    3.99  )-----------( 39       ( 13 Dec 2019 08:28 )             22.8       12-14    131<L>  |  95<L>  |  27<H>  ----------------------------<  195<H>  3.6   |  25  |  1.23    Ca    10.5      14 Dec 2019 06:18    TPro  5.7<L>  /  Alb  3.2<L>  /  TBili  8.2<H>  /  DBili  x   /  AST  27  /  ALT  20  /  AlkPhos  111  12-14        Culture - Urine (collected 10 Dec 2019 23:06)  Source: .Urine Clean Catch (Midstream)  Final Report (14 Dec 2019 08:18):    >100,000 CFU/ml Escherichia coli ESBL    >100,000 CFU/ml Klebsiella pneumoniae  Organism: Escherichia coli ESBL  Klebsiella pneumoniae (14 Dec 2019 08:18)  Organism: Klebsiella pneumoniae (14 Dec 2019 08:18)      -  Amikacin: S <=16      -  Ampicillin: R >16 These ampicillin results predict results for amoxicillin      -  Ampicillin/Sulbactam: S <=8/4 Enterobacter, Citrobacter, and Serratia may develop resistance during prolonged therapy (3-4 days)      -  Aztreonam: S <=4      -  Cefazolin: S <=8 ([id number]_COM_[email or web address]) For uncomplicated UTI with K. pneumoniae, E. coli, or P. mirablis: LEONARDO <=16 is sensitive and LEONARDO >=32 is resistant. This also predicts results for oral agents cefaclor, cefdinir, cefpodoxime, cefprozil, cefuroxime axetil, cephalexin and locarbef for uncomplicated UTI. Note that some isolates may be susceptible to these agents while testing resistant to cefazolin.      -  Cefepime: S <=4      -  Cefoxitin: S <=8      -  Ceftriaxone: S <=1 Enterobacter, Citrobacter, and Serratia may develop resistance during prolonged therapy      -  Ciprofloxacin: S <=1      -  Gentamicin: S <=4      -  Imipenem: S <=1      -  Levofloxacin: S <=2      -  Meropenem: S <=1      -  Nitrofurantoin: I 64 Should not be used to treat pyelonephritis      -  Piperacillin/Tazobactam: S <=16      -  Tigecycline: S <=2      -  Tobramycin: S <=4      -  Trimethoprim/Sulfamethoxazole: S <=2/38      Method Type: LEONARDO  Organism: Escherichia coli ESBL (14 Dec 2019 08:18)      -  Amikacin: S <=16      -  Ampicillin: R >16 These ampicillin results predict results for amoxicillin      -  Ampicillin/Sulbactam: R 16/8 Enterobacter, Citrobacter, and Serratia may develop resistance during prolonged therapy (3-4 days)      -  Aztreonam: R >16      -  Cefazolin: R >16 (MIC_CL_COM_ENTERIC_CEFAZU) For uncomplicated UTI with K. pneumoniae, E. coli, or P. mirablis: LEONARDO <=16 is sensitive and LEONARDO >=32 is resistant. This also predicts results for oral agents cefaclor, cefdinir, cefpodoxime, cefprozil, cefuroxime axetil, cephalexin and locarbef for uncomplicated UTI. Note that some isolates may be susceptible to these agents while testing resistant to cefazolin.      -  Cefepime: R >16      -  Cefoxitin: S <=8      -  Ceftriaxone: R >32 Enterobacter, Citrobacter, and Serratia may develop resistance during prolonged therapy      -  Ciprofloxacin: R >2      -  Ertapenem: S <=1      -  Gentamicin: S <=4      -  Imipenem: S <=1      -  Levofloxacin: R >4      -  Meropenem: S <=1      -  Nitrofurantoin: S <=32 Should not be used to treat pyelonephritis      -  Piperacillin/Tazobactam: R <=16      -  Tigecycline: S <=2      -  Tobramycin: S <=4      -  Trimethoprim/Sulfamethoxazole: S <=2/38      Method Type: LEONARDO        < from: MR Abdomen w/wo IV Cont (12.06.19 @ 13:46) >  IMPRESSION:     Cirrhosis with evidence of portal hypertension.    No focal hepatic lesion.    Standard hepatic arterial anatomy with widely patent celiac axis and SMA.   SMV and portal veins are patent as above.            < end of copied text >

## 2019-12-14 NOTE — PROGRESS NOTE ADULT - SUBJECTIVE AND OBJECTIVE BOX
SUBJECTIVE / OVERNIGHT EVENTS: pt denies headache, nausea,v   chest pain, shortness of breath ,     MEDICATIONS  (STANDING):  artificial  tears Solution 1 Drop(s) Both EYES three times a day  atorvastatin 10 milliGRAM(s) Oral at bedtime  dextrose 5%. 1000 milliLiter(s) (50 mL/Hr) IV Continuous <Continuous>  dextrose 50% Injectable 12.5 Gram(s) IV Push once  dextrose 50% Injectable 25 Gram(s) IV Push once  dextrose 50% Injectable 25 Gram(s) IV Push once  ertapenem  IVPB      ertapenem  IVPB 1000 milliGRAM(s) IV Intermittent every 24 hours  insulin glargine Injectable (LANTUS) 28 Unit(s) SubCutaneous at bedtime  insulin lispro (HumaLOG) corrective regimen sliding scale   SubCutaneous three times a day before meals  insulin lispro (HumaLOG) corrective regimen sliding scale   SubCutaneous at bedtime  insulin lispro Injectable (HumaLOG) 8 Unit(s) SubCutaneous three times a day before meals  lactulose Syrup 30 Gram(s) Oral every 4 hours  midodrine. 20 milliGRAM(s) Oral three times a day  pantoprazole    Tablet 40 milliGRAM(s) Oral before breakfast  rifAXIMin 550 milliGRAM(s) Oral two times a day  sodium chloride 2 Gram(s) Oral three times a day  sucralfate 1 Gram(s) Oral two times a day  tamsulosin 0.4 milliGRAM(s) Oral at bedtime    MEDICATIONS  (PRN):  dextrose 40% Gel 15 Gram(s) Oral once PRN Blood Glucose LESS THAN 70 milliGRAM(s)/deciliter  glucagon  Injectable 1 milliGRAM(s) IntraMuscular once PRN Glucose LESS THAN 70 milligrams/deciliter    Vital Signs Last 24 Hrs  T(C): 36.8 (14 Dec 2019 12:43), Max: 36.8 (14 Dec 2019 12:43)  T(F): 98.3 (14 Dec 2019 12:43), Max: 98.3 (14 Dec 2019 12:43)  HR: 75 (14 Dec 2019 12:43) (74 - 79)  BP: 98/62 (14 Dec 2019 12:43) (98/62 - 123/69)  BP(mean): --  RR: 18 (14 Dec 2019 12:43) (17 - 19)  SpO2: 94% (14 Dec 2019 12:43) (94% - 97%)    Constitutional: No fever, fatigue  Skin: No rash.  Eyes: No recent vision problems or eye pain.  ENT: No congestion, ear pain, or sore throat.  Cardiovascular: No chest pain or palpation.  Respiratory: No cough, shortness of breath, congestion, or wheezing.  Gastrointestinal: No abdominal pain, nausea, vomiting, or diarrhea.  Genitourinary: No dysuria.  Musculoskeletal: No joint swelling.  Neurologic: No headache.    PHYSICAL EXAM:  GENERAL: NAD  EYES: EOMI, PERRLA  NECK: Supple, No JVD  CHEST/LUNG: dec breath sounds at bases   HEART:  S1 , S2 +  ABDOMEN: soft , bs+, mild distension + , no tenderness  EXTREMITIES:  no edema  NEUROLOGY:alert awake oriented   flap tremor+     LABS:  12-14    131<L>  |  95<L>  |  27<H>  ----------------------------<  195<H>  3.6   |  25  |  1.23    Ca    10.5      14 Dec 2019 06:18    TPro  5.7<L>  /  Alb  3.2<L>  /  TBili  8.2<H>  /  DBili      /  AST  27  /  ALT  20  /  AlkPhos  111  12-14    Creatinine Trend: 1.23 <--, 1.23 <--, 1.27 <--, 1.32 <--, 1.48 <--, 1.42 <--, 1.39 <--, 1.54 <--, 1.86 <--, 1.80 <--, 1.81 <--, 1.75 <--, 1.76 <--, 1.80 <--                        8.0    3.85  )-----------( 40       ( 14 Dec 2019 11:06 )             23.7     Urine Studies:  Urinalysis Basic - ( 10 Dec 2019 22:55 )    Color: Dark Red / Appearance: Turbid / S.025 / pH:   Gluc:  / Ketone: Trace  / Bili: Small / Urobili: <2 mg/dL   Blood:  / Protein: 100 mg/dL / Nitrite: Negative   Leuk Esterase: Moderate / RBC: >720 /HPF / WBC >720 /HPF   Sq Epi:  / Non Sq Epi: 0 /HPF / Bacteria: Few              LIVER FUNCTIONS - ( 14 Dec 2019 06:18 )  Alb: 3.2 g/dL / Pro: 5.7 g/dL / ALK PHOS: 111 U/L / ALT: 20 U/L / AST: 27 U/L / GGT: x           PT/INR - ( 13 Dec 2019 08:30 )   PT: 26.3 sec;   INR: 2.23 ratio         PTT - ( 13 Dec 2019 08:30 )  PTT:52.5 sec

## 2019-12-14 NOTE — PROGRESS NOTE ADULT - ASSESSMENT
63M w/ pmh HTN, HLD, DM, non-alcoholic cirrhosis, chronic thrombocytopenia -- sent from rehab for worsening jaundice, dizzy/weakness/falls x 4 days.  h/o strep bacteremia 10/19 s/p RX  Was being observed off abx  12/6 urine Cx with E coli and kleb-ESBL  repeat Cx with same isolates   Patient does not provide reliable ROS  No bob  No other systemic symptoms though ? confused/encephalopathic  ? colonization/contamination vs UTI    Problem/Plan - 1:  ·  Problem: Urinary tract infection without hematuria, site unspecified.  Plan: Clinically better  Denies symptoms  no s/s dissemination  Improved  Continue Invanz -if stable can Dc after sunday dose   Monitor for s/s any complication or other nosocomial foci.     Problem/Plan - 2:  ·  Problem: Hepatic cirrhosis, unspecified hepatic cirrhosis type, unspecified whether ascites present.  Plan: will defer to hepatology.     Problem/Plan - 3:  ·  Problem: Hepatic encephalopathy.  Plan: improving  Unclear if component from UTI  Continue invanz as above  Other plan per primary/hepatology.       Patient being considered for transplant- Dr toussaint(ID transplant) will assume acre of ID issues staring Monday  Will tailor plan for ID issues  per course,results.Will defer to primary team on management of other issues.  case d/w Med NP  Infectious Diseases Service will cover over weekend.  Please call [phone] if issues.

## 2019-12-14 NOTE — PROGRESS NOTE ADULT - ASSESSMENT
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    #Presumed JEAN cirrhosis, MELD-Na 12/14 28       - varices: none on EGD outside in 10/2019      - HE: present, on lactulose and Xifaxan       - ascites: none currently, but history of ascites with SBP      - HCC: none on US 11/27, AFP 23 in 10/19; MRI w/o lesions from 12/6      - Vascular assessment: poor US doppler exam for eval of vessels due to body habitus       - Liver Workup: HAV immune, HBV non-immune, HCV neg, HIV neg, iron studies consistent wiith AoCD, RPR neg, QG neg, CMV neg, EBV IgG pos only, HSV1 pos, ASMA neg, AMA neg, ceruloplasmin LOW 12      - Age appropriate screening: colonoscopy 5-6 months ago per patient      - Dental evaluation: >1 year ago, no loose teeth per patient      - Cardiac evaluation: LHC completed 12/5, mild-mod non-occlusive CAD with low filling pressures, no interventions needed       - Blood type A+, Ab neg      - Previous transplant workup: none      - Social: retired, previous , lives in [place] with wife and son, previously highly functional (before October), Insurance -Medicare HMO  # MISA, Cr stable, likely pre-renal vs HRS; urine sodium still low, likely HRS  # Chest wall hematoma  # T2DM  # Hypertension (currently normotensive)    Recs:  - Monitor CBC, CMP, INR  - stop IV Albumin as Cr improved  - increase lactulose to 30mg q4-q6 hours (want to ensure 3 BMs daily)  - can also add Miralax BID  - continue rifaximin BID  - will review imaging with IR to eval for splenorenal shunt and whether it would be amenable to embolization (for treatment of refractory encephalopathy)  - obtain pt's outpatient urology records and biopsy reports  - discuss with ID re: stop date for antibiotics for UTI  - continue PT  - continue midodrine 20mg TID, continue to hold diuretics  - EGD/colon report in the chart  - patient to be discussed at transplant evaluation committee today, followup with Dr. Medina in transplant clinic  - ok for discharge from hepatology perspective     Please call Hepatology ([phone]) if there are any additional questions or concerns. Please call on-call GI fellow after 5pm and before 8am, and on weekends. 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    #Presumed JEAN cirrhosis, MELD-Na 12/14 28       - varices: none on EGD outside in 10/2019      - HE: present, on lactulose and Xifaxan      - Ascites: none currently, but history of ascites with SBP      - HCC: none on US 11/27, AFP 23 in 10/19; MRI w/o lesions from 12/6      - Vascular assessment: poor US doppler exam for eval of vessels due to body habitus       - Liver Workup: HAV immune, HBV non-immune, HCV neg, HIV neg, iron studies consistent wiith AoCD, RPR neg, QG neg, CMV neg, EBV IgG pos only, HSV1 pos, ASMA neg, AMA neg, ceruloplasmin LOW 12      - Age appropriate screening: colonoscopy 5-6 months ago per patient      - Dental evaluation: >1 year ago, no loose teeth per patient      - Cardiac evaluation: LHC completed 12/5, mild-mod non-occlusive CAD with low filling pressures, no interventions needed       - Blood type A+, Ab neg      - Previous transplant workup: none      - Social: retired, previous , lives in [place] with wife and son, previously highly functional (before October), Insurance -Medicare HMO  # MISA, Cr downtrending, likely pre-renal vs HRS; urine sodium still low, likely HRS  # Chest wall hematoma  # T2DM  # Hypertension (currently normotensive  # UTI on ertapenem    Recs:  - Monitor CBC, CMP, INR  - Continue lactulose to 30mg q4-q6 hours (want to ensure 3 BMs daily)  - can also add Miralax BID  - continue rifaximin BID  - obtain pt's outpatient urology records and biopsy reports  - discuss with ID re: stop date for antibiotics for UTI  - continue PT  - continue midodrine 20mg TID, continue to hold diuretics  - EGD/colon report in the chart  - ok for discharge from hepatology perspective     Please call Hepatology ([phone]) if there are any additional questions or concerns. Please call on-call GI fellow after 5pm and before 8am, and on weekends. Impression:  62yo M with Hypertension, DMT2, BPH, obesity, BMI 33, HFpEF grade 1, duodenal ulcer (May, Sep), GAVE s/p APC (Sep), JEAN cirrhosis, who presented from rehab on 11/26 with confusion and MISA creat 2.55, hyponatremia Na 125.   Course: UTI on 12/01. Improved, but continuing confusion. Now listed for transplant.    # JEAN cirrhosis, MELD-Na 12/14 28 (INR 2.23, Na 131, Creat 1.23, bili 8.2)      - varices: none on EGD outside in 10/2019      - HE: present, on high-dose lactulose 30 g q4hrs with 3 soft BM o/n, and Xifaxan      - Ascites: none currently, but history of ascites with SBP      - HCC: none on US 11/27, AFP 23 in 10/19; MRI w/o lesions from 12/6      - Vascular assessment: poor US doppler exam for eval of vessels due to body habitus       - Liver Workup: HAV immune, HBV non-immune, HCV neg, HIV neg, iron studies consistent wiith AoCD, RPR neg, QG neg, CMV neg, EBV IgG pos only, HSV1 pos, ASMA neg, AMA neg, ceruloplasmin LOW 12      - Age appropriate screening: colonoscopy 5-6 months ago per patient      - Dental evaluation: >1 year ago, no loose teeth per patient      - Cardiac evaluation: [facility] completed 12/5, mild-mod non-occlusive CAD with low filling pressures, no interventions needed       - Blood type A+, Ab neg      - Previous transplant workup: none      - Social: retired, previous , [place], previously highly functional (before October), Insurance -Medicare HMO  # MISA, Cr downtrending, likely pre-renal vs HRS; urine sodium still low, likely HRS  # Chest wall hematoma  # T2DM  # Hypertension (currently normotensive  # UTI on ertapenem    Recs:  - Monitor CBC, CMP, INR  - Continue lactulose to 30mg q4-q6 hours (want to ensure 3 BMs daily)  - continue rifaximin BID  - obtain pt's outpatient urology records and biopsy reports  - discuss with ID re: stop date for antibiotics for UTI  - continue PT  - continue midodrine 20mg TID, continue to hold diuretics  - EGD/colon report in the chart  - ok for discharge from hepatology perspective     Please call Hepatology ([phone]) if there are any additional questions or concerns. Please call on-call GI fellow after 5pm and before 8am, and on weekends.

## 2019-12-14 NOTE — PROGRESS NOTE ADULT - SUBJECTIVE AND OBJECTIVE BOX
· Subjective and Objective:   [facility] - Sushila Dhaliwal, Morenita Camacho, Clay Rollins Savella, Goodger  Office Number:  [phone]    Patient resting comfortably in bed in NAD.  Laying flat with no respiratory distress.  No complaints of chest pain, dyspnea, palpitations, PND, or orthopnea.    F/U for:  hypotension, preoperative assessment    Telemetry:  OFF    REVIEW OF SYSTEMS otherwise negative unless noted        PAST MEDICAL & SURGICAL HISTORY:  GIB (gastrointestinal bleeding)  GERD with esophagitis: Gastritis &amp; Non Bleeding Ulcers  Hepatic encephalopathy  Obesity  Fatty liver disease, nonalcoholic  Renal stones: 25 years ago  Hypertension  Neuropathy  Hypercholesteremia  Diabetes  S/P cholecystectomy      MEDICATIONS  (STANDING):  artificial  tears Solution 1 Drop(s) Both EYES three times a day  atorvastatin 10 milliGRAM(s) Oral at bedtime  dextrose 5%. 1000 milliLiter(s) (50 mL/Hr) IV Continuous <Continuous>  dextrose 50% Injectable 12.5 Gram(s) IV Push once  dextrose 50% Injectable 25 Gram(s) IV Push once  dextrose 50% Injectable 25 Gram(s) IV Push once  ertapenem  IVPB      ertapenem  IVPB 1000 milliGRAM(s) IV Intermittent every 24 hours  insulin glargine Injectable (LANTUS) 28 Unit(s) SubCutaneous at bedtime  insulin lispro (HumaLOG) corrective regimen sliding scale   SubCutaneous three times a day before meals  insulin lispro (HumaLOG) corrective regimen sliding scale   SubCutaneous at bedtime  insulin lispro Injectable (HumaLOG) 8 Unit(s) SubCutaneous three times a day before meals  lactulose Syrup 30 Gram(s) Oral every 4 hours  midodrine. 20 milliGRAM(s) Oral three times a day  pantoprazole    Tablet 40 milliGRAM(s) Oral before breakfast  rifAXIMin 550 milliGRAM(s) Oral two times a day  sucralfate 1 Gram(s) Oral two times a day  tamsulosin 0.4 milliGRAM(s) Oral at bedtime      Allergies    codeine (Anaphylaxis)    Intolerances    NO  RED MEAT (Unknown)                            7.7    3.99  )-----------( 39       ( 13 Dec 2019 08:28 )             22.8       12-14    131<L>  |  95<L>  |  27<H>  ----------------------------<  195<H>  3.6   |  25  |  1.23    Ca    10.5      14 Dec 2019 06:18    TPro  5.7<L>  /  Alb  3.2<L>  /  TBili  8.2<H>  /  DBili  x   /  AST  27  /  ALT  20  /  AlkPhos  111  12-14      LIVER FUNCTIONS - ( 14 Dec 2019 06:18 )  Alb: 3.2 g/dL / Pro: 5.7 g/dL / ALK PHOS: 111 U/L / ALT: 20 U/L / AST: 27 U/L / GGT: x             PT/INR - ( 13 Dec 2019 08:30 )   PT: 26.3 sec;   INR: 2.23 ratio         PTT - ( 13 Dec 2019 08:30 )  PTT:52.5 sec                      Daily     Daily     I&O's Summary    13 Dec 2019 07:01  -  14 Dec 2019 07:00  --------------------------------------------------------  IN: 120 mL / OUT: 800 mL / NET: -680 mL        Vital Signs Last 24 Hrs  T(C): 36.4 (14 Dec 2019 05:16), Max: 36.7 (13 Dec 2019 14:51)  T(F): 97.5 (14 Dec 2019 05:16), Max: 98.1 (13 Dec 2019 14:51)  HR: 76 (14 Dec 2019 05:16) (74 - 79)  BP: 101/53 (14 Dec 2019 05:16) (101/53 - 123/69)  BP(mean): --  RR: 18 (14 Dec 2019 05:16) (17 - 19)  SpO2: 97% (14 Dec 2019 05:16) (95% - 99%)    PHYSICAL EXAM:   · Constitutional	Well-developed, well nourished  · Eyes	EOMI; PERRL; no drainage or redness  · ENMT	No oral lesions; no gross abnormalities  · Neck	No bruits; no thyromegaly or nodules  · Respiratory	Normal breath sounds b/l, No RRW  · Cardiovascular	Regular rate & rhythm, normal S1, S2; no murmurs, gallops or rubs; no S3, S4  · Gastrointestinal	Soft, non-tender, no hepatosplenomegaly, normal bowel sounds  · Extremities	No cyanosis, clubbing or edema  · Vascular	Equal and normal pulses (carotid, femoral, dorsalis pedis)  · Neurological	Alert & oriented; no sensory, motor or coordination deficits, normal reflexes

## 2019-12-15 LAB
ANION GAP SERPL CALC-SCNC: 10 MMOL/L — SIGNIFICANT CHANGE UP (ref 5–17)
BUN SERPL-MCNC: 25 MG/DL — HIGH (ref 7–23)
CALCIUM SERPL-MCNC: 10.5 MG/DL — SIGNIFICANT CHANGE UP (ref 8.4–10.5)
CHLORIDE SERPL-SCNC: 98 MMOL/L — SIGNIFICANT CHANGE UP (ref 96–108)
CO2 SERPL-SCNC: 26 MMOL/L — SIGNIFICANT CHANGE UP (ref 22–31)
CREAT SERPL-MCNC: 1.25 MG/DL — SIGNIFICANT CHANGE UP (ref 0.5–1.3)
GLUCOSE BLDC GLUCOMTR-MCNC: 191 MG/DL — HIGH (ref 70–99)
GLUCOSE BLDC GLUCOMTR-MCNC: 195 MG/DL — HIGH (ref 70–99)
GLUCOSE BLDC GLUCOMTR-MCNC: 238 MG/DL — HIGH (ref 70–99)
GLUCOSE BLDC GLUCOMTR-MCNC: 268 MG/DL — HIGH (ref 70–99)
GLUCOSE SERPL-MCNC: 185 MG/DL — HIGH (ref 70–99)
HCT VFR BLD CALC: 25.2 % — LOW (ref 39–50)
HGB BLD-MCNC: 8.4 G/DL — LOW (ref 13–17)
MCHC RBC-ENTMCNC: 32.8 PG — SIGNIFICANT CHANGE UP (ref 27–34)
MCHC RBC-ENTMCNC: 33.3 GM/DL — SIGNIFICANT CHANGE UP (ref 32–36)
MCV RBC AUTO: 98.4 FL — SIGNIFICANT CHANGE UP (ref 80–100)
NRBC # BLD: 0 /100 WBCS — SIGNIFICANT CHANGE UP (ref 0–0)
PLATELET # BLD AUTO: 43 K/UL — LOW (ref 150–400)
POTASSIUM SERPL-MCNC: 3.8 MMOL/L — SIGNIFICANT CHANGE UP (ref 3.5–5.3)
POTASSIUM SERPL-SCNC: 3.8 MMOL/L — SIGNIFICANT CHANGE UP (ref 3.5–5.3)
RBC # BLD: 2.56 M/UL — LOW (ref 4.2–5.8)
RBC # FLD: 18.1 % — HIGH (ref 10.3–14.5)
SODIUM SERPL-SCNC: 134 MMOL/L — LOW (ref 135–145)
WBC # BLD: 4.08 K/UL — SIGNIFICANT CHANGE UP (ref 3.8–10.5)
WBC # FLD AUTO: 4.08 K/UL — SIGNIFICANT CHANGE UP (ref 3.8–10.5)

## 2019-12-15 PROCEDURE — 99232 SBSQ HOSP IP/OBS MODERATE 35: CPT

## 2019-12-15 PROCEDURE — 99232 SBSQ HOSP IP/OBS MODERATE 35: CPT | Mod: GC

## 2019-12-15 RX ADMIN — MIDODRINE HYDROCHLORIDE 20 MILLIGRAM(S): 2.5 TABLET ORAL at 17:20

## 2019-12-15 RX ADMIN — PANTOPRAZOLE SODIUM 40 MILLIGRAM(S): 20 TABLET, DELAYED RELEASE ORAL at 06:17

## 2019-12-15 RX ADMIN — Medication 1 DROP(S): at 06:16

## 2019-12-15 RX ADMIN — Medication 8 UNIT(S): at 09:33

## 2019-12-15 RX ADMIN — MIDODRINE HYDROCHLORIDE 20 MILLIGRAM(S): 2.5 TABLET ORAL at 12:50

## 2019-12-15 RX ADMIN — Medication 8 UNIT(S): at 17:33

## 2019-12-15 RX ADMIN — Medication 1 GRAM(S): at 17:22

## 2019-12-15 RX ADMIN — Medication 2: at 12:49

## 2019-12-15 RX ADMIN — LACTULOSE 30 GRAM(S): 10 SOLUTION ORAL at 14:06

## 2019-12-15 RX ADMIN — LACTULOSE 30 GRAM(S): 10 SOLUTION ORAL at 17:23

## 2019-12-15 RX ADMIN — Medication 3: at 17:32

## 2019-12-15 RX ADMIN — INSULIN GLARGINE 28 UNIT(S): 100 INJECTION, SOLUTION SUBCUTANEOUS at 22:04

## 2019-12-15 RX ADMIN — LACTULOSE 30 GRAM(S): 10 SOLUTION ORAL at 21:17

## 2019-12-15 RX ADMIN — SODIUM CHLORIDE 2 GRAM(S): 9 INJECTION INTRAMUSCULAR; INTRAVENOUS; SUBCUTANEOUS at 06:17

## 2019-12-15 RX ADMIN — LACTULOSE 30 GRAM(S): 10 SOLUTION ORAL at 06:16

## 2019-12-15 RX ADMIN — ERTAPENEM SODIUM 120 MILLIGRAM(S): 1 INJECTION, POWDER, LYOPHILIZED, FOR SOLUTION INTRAMUSCULAR; INTRAVENOUS at 17:30

## 2019-12-15 RX ADMIN — Medication 8 UNIT(S): at 12:48

## 2019-12-15 RX ADMIN — SODIUM CHLORIDE 2 GRAM(S): 9 INJECTION INTRAMUSCULAR; INTRAVENOUS; SUBCUTANEOUS at 14:07

## 2019-12-15 RX ADMIN — Medication 1: at 08:33

## 2019-12-15 RX ADMIN — TAMSULOSIN HYDROCHLORIDE 0.4 MILLIGRAM(S): 0.4 CAPSULE ORAL at 21:17

## 2019-12-15 RX ADMIN — MIDODRINE HYDROCHLORIDE 20 MILLIGRAM(S): 2.5 TABLET ORAL at 06:16

## 2019-12-15 RX ADMIN — ATORVASTATIN CALCIUM 10 MILLIGRAM(S): 80 TABLET, FILM COATED ORAL at 21:17

## 2019-12-15 RX ADMIN — LACTULOSE 30 GRAM(S): 10 SOLUTION ORAL at 09:35

## 2019-12-15 RX ADMIN — Medication 1 GRAM(S): at 06:17

## 2019-12-15 RX ADMIN — Medication 1 DROP(S): at 14:06

## 2019-12-15 NOTE — PROGRESS NOTE ADULT - SUBJECTIVE AND OBJECTIVE BOX
· Subjective and Objective:   [facility] - Sushila Dhaliwal, Morenita Camacho, Clay Rollins Savella, Goodger  Office Number:  [phone]    Patient resting comfortably in bed in NAD.  Laying flat with no respiratory distress.  No complaints of chest pain, dyspnea, palpitations, PND, or orthopnea.    F/U for:  hypotension, preoperative assessment    Telemetry:  OFF    REVIEW OF SYSTEMS otherwise negative unless noted        PAST MEDICAL & SURGICAL HISTORY:  GIB (gastrointestinal bleeding)  GERD with esophagitis: Gastritis &amp; Non Bleeding Ulcers  Hepatic encephalopathy  Obesity  Fatty liver disease, nonalcoholic  Renal stones: 25 years ago  Hypertension  Neuropathy  Hypercholesteremia  Diabetes  S/P cholecystectomy      MEDICATIONS  (STANDING):  artificial  tears Solution 1 Drop(s) Both EYES three times a day  atorvastatin 10 milliGRAM(s) Oral at bedtime  dextrose 5%. 1000 milliLiter(s) (50 mL/Hr) IV Continuous <Continuous>  dextrose 50% Injectable 12.5 Gram(s) IV Push once  dextrose 50% Injectable 25 Gram(s) IV Push once  dextrose 50% Injectable 25 Gram(s) IV Push once  ertapenem  IVPB      ertapenem  IVPB 1000 milliGRAM(s) IV Intermittent every 24 hours  insulin glargine Injectable (LANTUS) 28 Unit(s) SubCutaneous at bedtime  insulin lispro (HumaLOG) corrective regimen sliding scale   SubCutaneous three times a day before meals  insulin lispro (HumaLOG) corrective regimen sliding scale   SubCutaneous at bedtime  insulin lispro Injectable (HumaLOG) 8 Unit(s) SubCutaneous three times a day before meals  lactulose Syrup 30 Gram(s) Oral every 4 hours  midodrine. 20 milliGRAM(s) Oral three times a day  pantoprazole    Tablet 40 milliGRAM(s) Oral before breakfast  rifAXIMin 550 milliGRAM(s) Oral two times a day  sodium chloride 2 Gram(s) Oral three times a day  sucralfate 1 Gram(s) Oral two times a day  tamsulosin 0.4 milliGRAM(s) Oral at bedtime      Allergies    codeine (Anaphylaxis)    Intolerances    NO  RED MEAT (Unknown)                            8.0    3.85  )-----------( 40       ( 14 Dec 2019 11:06 )             23.7       12-15    134<L>  |  98  |  25<H>  ----------------------------<  185<H>  3.8   |  26  |  1.25    Ca    10.5      15 Dec 2019 07:22    TPro  5.7<L>  /  Alb  3.2<L>  /  TBili  8.2<H>  /  DBili  x   /  AST  27  /  ALT  20  /  AlkPhos  111  12-14      LIVER FUNCTIONS - ( 14 Dec 2019 06:18 )  Alb: 3.2 g/dL / Pro: 5.7 g/dL / ALK PHOS: 111 U/L / ALT: 20 U/L / AST: 27 U/L / GGT: x                                   Daily     Daily Weight in k (14 Dec 2019 21:03)    I&O's Summary    14 Dec 2019 07:01  -  15 Dec 2019 07:00  --------------------------------------------------------  IN: 360 mL / OUT: 1401 mL / NET: -1041 mL        Vital Signs Last 24 Hrs  T(C): 36.7 (15 Dec 2019 05:10), Max: 36.8 (14 Dec 2019 12:43)  T(F): 98 (15 Dec 2019 05:10), Max: 98.3 (14 Dec 2019 12:43)  HR: 78 (15 Dec 2019 05:10) (75 - 82)  BP: 108/65 (15 Dec 2019 05:10) (98/62 - 125/70)  BP(mean): --  RR: 18 (15 Dec 2019 05:10) (18 - 18)  SpO2: 99% (15 Dec 2019 05:10) (94% - 99%)    PHYSICAL EXAM:   · Constitutional	Well-developed, well nourished  · Eyes	EOMI; PERRL; no drainage or redness  · ENMT	No oral lesions; no gross abnormalities  · Neck	No bruits; no thyromegaly or nodules  · Respiratory	Normal breath sounds b/l, No RRW  · Cardiovascular	Regular rate & rhythm, normal S1, S2; no murmurs, gallops or rubs; no S3, S4  · Gastrointestinal	Soft, non-tender, no hepatosplenomegaly, normal bowel sounds  · Extremities	No cyanosis, clubbing or edema  · Vascular	Equal and normal pulses (carotid, femoral, dorsalis pedis)  · Neurological	Alert & oriented; no sensory, motor or coordination deficits, normal reflexes

## 2019-12-15 NOTE — PROGRESS NOTE ADULT - SUBJECTIVE AND OBJECTIVE BOX
Pt seen, feeling fine    MEDICATIONS  (STANDING):  artificial  tears Solution 1 Drop(s) Both EYES three times a day  atorvastatin 10 milliGRAM(s) Oral at bedtime  dextrose 5%. 1000 milliLiter(s) (50 mL/Hr) IV Continuous <Continuous>  dextrose 50% Injectable 12.5 Gram(s) IV Push once  dextrose 50% Injectable 25 Gram(s) IV Push once  dextrose 50% Injectable 25 Gram(s) IV Push once  ertapenem  IVPB      ertapenem  IVPB 1000 milliGRAM(s) IV Intermittent every 24 hours  insulin glargine Injectable (LANTUS) 28 Unit(s) SubCutaneous at bedtime  insulin lispro (HumaLOG) corrective regimen sliding scale   SubCutaneous three times a day before meals  insulin lispro (HumaLOG) corrective regimen sliding scale   SubCutaneous at bedtime  insulin lispro Injectable (HumaLOG) 8 Unit(s) SubCutaneous three times a day before meals  lactulose Syrup 30 Gram(s) Oral every 4 hours  midodrine. 20 milliGRAM(s) Oral three times a day  pantoprazole    Tablet 40 milliGRAM(s) Oral before breakfast  rifAXIMin 550 milliGRAM(s) Oral two times a day  sodium chloride 2 Gram(s) Oral three times a day  sucralfate 1 Gram(s) Oral two times a day  tamsulosin 0.4 milliGRAM(s) Oral at bedtime    MEDICATIONS  (PRN):  dextrose 40% Gel 15 Gram(s) Oral once PRN Blood Glucose LESS THAN 70 milliGRAM(s)/deciliter  glucagon  Injectable 1 milliGRAM(s) IntraMuscular once PRN Glucose LESS THAN 70 milligrams/deciliter      ROS  No fever, sweats, chills  No epistaxis, HA, sore throat  No CP, SOB, cough, sputum  No n/v/d, abd pain, melena, hematochezia  No edema  No rash  No anxiety  No back pain, joint pain  No bleeding, bruising  No dysuria, hematuria    Vital Signs Last 24 Hrs  T(C): 36.7 (15 Dec 2019 05:10), Max: 36.7 (15 Dec 2019 05:10)  T(F): 98 (15 Dec 2019 05:10), Max: 98 (15 Dec 2019 05:10)  HR: 78 (15 Dec 2019 05:10) (78 - 82)  BP: 108/65 (15 Dec 2019 05:10) (108/65 - 125/70)  BP(mean): --  RR: 18 (15 Dec 2019 05:10) (18 - 18)  SpO2: 99% (15 Dec 2019 05:10) (95% - 99%)    PE  NAD  Awake, alert  icteric, mild, MMM  RRR  CTAB  Abd soft, NT, ND  No c/c/e  No rash grossly  FROM                          8.4    4.08  )-----------( 43       ( 15 Dec 2019 11:22 )             25.2       12-15    134<L>  |  98  |  25<H>  ----------------------------<  185<H>  3.8   |  26  |  1.25    Ca    10.5      15 Dec 2019 07:22    TPro  5.7<L>  /  Alb  3.2<L>  /  TBili  8.2<H>  /  DBili  x   /  AST  27  /  ALT  20  /  AlkPhos  111  12-14

## 2019-12-15 NOTE — PROGRESS NOTE ADULT - SUBJECTIVE AND OBJECTIVE BOX
SUBJECTIVE / OVERNIGHT EVENTS: pt denies headache, nausea,v   chest pain, shortness of breath ,     MEDICATIONS  (STANDING):  artificial  tears Solution 1 Drop(s) Both EYES three times a day  atorvastatin 10 milliGRAM(s) Oral at bedtime  dextrose 5%. 1000 milliLiter(s) (50 mL/Hr) IV Continuous <Continuous>  dextrose 50% Injectable 12.5 Gram(s) IV Push once  dextrose 50% Injectable 25 Gram(s) IV Push once  dextrose 50% Injectable 25 Gram(s) IV Push once  insulin glargine Injectable (LANTUS) 28 Unit(s) SubCutaneous at bedtime  insulin lispro (HumaLOG) corrective regimen sliding scale   SubCutaneous three times a day before meals  insulin lispro (HumaLOG) corrective regimen sliding scale   SubCutaneous at bedtime  insulin lispro Injectable (HumaLOG) 8 Unit(s) SubCutaneous three times a day before meals  lactulose Syrup 30 Gram(s) Oral every 4 hours  midodrine. 20 milliGRAM(s) Oral three times a day  pantoprazole    Tablet 40 milliGRAM(s) Oral before breakfast  rifAXIMin 550 milliGRAM(s) Oral two times a day  sucralfate 1 Gram(s) Oral two times a day  tamsulosin 0.4 milliGRAM(s) Oral at bedtime    MEDICATIONS  (PRN):  dextrose 40% Gel 15 Gram(s) Oral once PRN Blood Glucose LESS THAN 70 milliGRAM(s)/deciliter  glucagon  Injectable 1 milliGRAM(s) IntraMuscular once PRN Glucose LESS THAN 70 milligrams/deciliter    Vital Signs Last 24 Hrs  T(C): 36.7 (15 Dec 2019 20:45), Max: 36.7 (15 Dec 2019 05:10)  T(F): 98 (15 Dec 2019 20:45), Max: 98 (15 Dec 2019 05:10)  HR: 69 (15 Dec 2019 20:45) (69 - 78)  BP: 123/69 (15 Dec 2019 14:07) (108/65 - 123/69)  BP(mean): --  RR: 18 (15 Dec 2019 20:45) (18 - 18)  SpO2: 98% (15 Dec 2019 20:45) (97% - 99%)    Constitutional: No fever, fatigue  Skin: No rash.  Eyes: No recent vision problems or eye pain.  ENT: No congestion, ear pain, or sore throat.  Cardiovascular: No chest pain or palpation.  Respiratory: No cough, shortness of breath, congestion, or wheezing.  Gastrointestinal: No abdominal pain, nausea, vomiting, or diarrhea.  Genitourinary: No dysuria.  Musculoskeletal: No joint swelling.  Neurologic: No headache.    PHYSICAL EXAM:  GENERAL: NAD  EYES: EOMI, PERRLA  NECK: Supple, No JVD  CHEST/LUNG: dec breath sounds at bases   HEART:  S1 , S2 +  ABDOMEN: soft , bs+, mild distension + , no tenderness  EXTREMITIES:  no edema  NEUROLOGY:alert awake oriented   flap tremor+    LABS:  12-15    134<L>  |  98  |  25<H>  ----------------------------<  185<H>  3.8   |  26  |  1.25    Ca    10.5      15 Dec 2019 07:22    TPro  5.7<L>  /  Alb  3.2<L>  /  TBili  8.2<H>  /  DBili      /  AST  27  /  ALT  20  /  AlkPhos  111  12-14    Creatinine Trend: 1.25 <--, 1.23 <--, 1.23 <--, 1.27 <--, 1.32 <--, 1.48 <--, 1.42 <--, 1.39 <--, 1.54 <--, 1.86 <--, 1.80 <--, 1.81 <--                        8.4    4.08  )-----------( 43       ( 15 Dec 2019 11:22 )             25.2     Urine Studies:  Urinalysis Basic - ( 10 Dec 2019 22:55 )    Color: Dark Red / Appearance: Turbid / S.025 / pH:   Gluc:  / Ketone: Trace  / Bili: Small / Urobili: <2 mg/dL   Blood:  / Protein: 100 mg/dL / Nitrite: Negative   Leuk Esterase: Moderate / RBC: >720 /HPF / WBC >720 /HPF   Sq Epi:  / Non Sq Epi: 0 /HPF / Bacteria: Few              LIVER FUNCTIONS - ( 14 Dec 2019 06:18 )  Alb: 3.2 g/dL / Pro: 5.7 g/dL / ALK PHOS: 111 U/L / ALT: 20 U/L / AST: 27 U/L / GGT: x

## 2019-12-15 NOTE — PROGRESS NOTE ADULT - ASSESSMENT
Impression:  64yo M with Hypertension, DMT2, BPH, obesity, BMI 33, HFpEF grade 1, duodenal ulcer (May, Sep), GAVE s/p APC (Sep), JEAN cirrhosis, who presented from rehab on 11/26 with confusion and MISA creat 2.55, hyponatremia Na 125.   Course: UTI on 12/01. Improved, but continuing confusion. Now listed for transplant.    # JEAN cirrhosis, MELD-Na 12/14 28 (INR 2.23, Na 131, Creat 1.23, bili 8.2)      - varices: none on EGD outside in 10/2019      - HE: present, on high-dose lactulose 30 g q4hrs with 3 soft BM o/n, and Xifaxan      - Ascites: none currently, but history of ascites with SBP      - HCC: none on US 11/27, AFP 23 in 10/19; MRI w/o lesions from 12/6      - Vascular assessment: poor US doppler exam for eval of vessels due to body habitus       - Liver Workup: HAV immune, HBV non-immune, HCV neg, HIV neg, iron studies consistent wiith AoCD, RPR neg, QG neg, CMV neg, EBV IgG pos only, HSV1 pos, ASMA neg, AMA neg, ceruloplasmin LOW 12      - Age appropriate screening: colonoscopy 5-6 months ago per patient      - Dental evaluation: >1 year ago, no loose teeth per patient      - Cardiac evaluation: [facility] completed 12/5, mild-mod non-occlusive CAD with low filling pressures, no interventions needed       - Blood type A+, Ab neg      - Previous transplant workup: none      - Social: retired, previous , lives in [place] with wife and son, previously highly functional (before October), Insurance -Medicare HMO  # MISA, Cr downtrending, likely pre-renal vs HRS; urine sodium still low, likely HRS  # Chest wall hematoma  # T2DM  # Hypertension (currently normotensive  # UTI on ertapenem    Recs:  - Monitor CBC, CMP, INR  - Continue lactulose to 30mg q4-q6 hours (want to ensure 3 BMs daily)  - continue rifaximin BID  - obtain pt's outpatient urology records and biopsy reports  - discuss with ID re: stop date for antibiotics for UTI  - continue PT  - continue midodrine 20mg TID, continue to hold diuretics  - EGD/colon report in the chart  - ok for discharge from hepatology perspective     Please call Hepatology ([phone]) if there are any additional questions or concerns. Please call on-call GI fellow after 5pm and before 8am, and on weekends.

## 2019-12-15 NOTE — PROGRESS NOTE ADULT - ASSESSMENT
· Assessment		  1. Chronic Normocytic Anemia    -- H/H stable  -- Undetectable Hapto sec to Liver Disease, remainder of hemolytic labs neg  -- Iron Studies c/w Anemia of Chronic Inflammation  -- No B12/Folate Deficiency  -- FOBT pos in the past  -- monitor Counts    2. IgG Kappa MGUS(Low Risk)    -- M spike only 200mg/dL  -- likely MGUS  -- bence jonce protein not present  -- monitor clinically for now, repeat levels in 3-4 months    3. cirrhosis   -- per GI and hepatology     4. thrombocytopenia sec to cirrhosis     -- platelet count stable  -- trend  -- transfuse for < 10,000 or <50,000 for procedures or evidence of bleeding    D/c planning from heme standpoint, pt to f/u with us in office [phone], pls call with questions, [phone]

## 2019-12-15 NOTE — PROGRESS NOTE ADULT - SUBJECTIVE AND OBJECTIVE BOX
INTERVAL HPI/OVERNIGHT EVENTS:  No new overnight event.  No N/V/D.  Tolerating diet.  no confusion    Allergies    codeine (Anaphylaxis)    Intolerances    NO  RED MEAT (Unknown)  General:  No wt loss, fevers, chills, night sweats, fatigue,   Eyes:  Good vision, no reported pain  ENT:  No sore throat, pain, runny nose, dysphagia  CV:  No pain, palpitations, hypo/hypertension  Resp:  No dyspnea, cough, tachypnea, wheezing  GI:  No pain, No nausea, No vomiting, No diarrhea, No constipation, No weight loss, No fever, No pruritis, No rectal bleeding, No tarry stools, No dysphagia,  :  No pain, bleeding, incontinence, nocturia  Muscle:  No pain, weakness  Neuro:  No weakness, tingling, memory problems  Psych:  No fatigue, insomnia, mood problems, depression  Endocrine:  No polyuria, polydipsia, cold/heat intolerance  Heme:  No petechiae, ecchymosis, easy bruisability  Skin:  No rash, tattoos, scars, edema      PHYSICAL EXAM:   Vital Signs:  Vital Signs Last 24 Hrs  T(C): 36.6 (15 Dec 2019 14:07), Max: 36.7 (15 Dec 2019 05:10)  T(F): 97.8 (15 Dec 2019 14:07), Max: 98 (15 Dec 2019 05:10)  HR: 78 (15 Dec 2019 14:07) (78 - 82)  BP: 123/69 (15 Dec 2019 14:07) (108/65 - 125/70)  BP(mean): --  RR: 18 (15 Dec 2019 14:07) (18 - 18)  SpO2: 97% (15 Dec 2019 14:07) (95% - 99%)  Daily     Daily Weight in k (14 Dec 2019 21:03)I&O's Summary    14 Dec 2019 07:01  -  15 Dec 2019 07:00  --------------------------------------------------------  IN: 360 mL / OUT: 1401 mL / NET: -1041 mL    15 Dec 2019 07:01  -  15 Dec 2019 16:27  --------------------------------------------------------  IN: 240 mL / OUT: 500 mL / NET: -260 mL        GENERAL:  Appears stated age, well-groomed, well-nourished, no distress  HEENT:  NC/AT,  conjunctivae clear and pink, no thyromegaly, nodules, adenopathy, no JVD, sclera -anicteric  CHEST:  Full & symmetric excursion, no increased effort, breath sounds clear  HEART:  Regular rhythm, S1, S2, no murmur/rub/S3/S4, no abdominal bruit, no edema  ABDOMEN:  Soft, non-tender, non-distended, normoactive bowel sounds,  no masses ,no hepato-splenomegaly, no signs of chronic liver disease  EXTEREMITIES:  no cyanosis,clubbing or edema  SKIN:  No rash/erythema/ecchymoses/petechiae/wounds/abscess/warm/dry  NEURO:  Alert, oriented, no asterixis, no tremor, no encephalopathy      LABS:                        8.4    4.08  )-----------( 43       ( 15 Dec 2019 11:22 )             25.2     12-15    134<L>  |  98  |  25<H>  ----------------------------<  185<H>  3.8   |  26  |  1.25    Ca    10.5      15 Dec 2019 07:22    TPro  5.7<L>  /  Alb  3.2<L>  /  TBili  8.2<H>  /  DBili  x   /  AST  27  /  ALT  20  /  AlkPhos  111  12-14        amylase   lipase  RADIOLOGY & ADDITIONAL TESTS:

## 2019-12-15 NOTE — PROGRESS NOTE ADULT - PROBLEM SELECTOR PLAN 1
serum creatinine is 1.25   , approximating GFR is increased    ml/min.   There is  progression . No uremic symptoms    pos evidence of anemia .  Fluid status stable.  Will continue to avoid nephrotoxic drugs.  Patient remains asymptomatic.   Continue current therapy.

## 2019-12-15 NOTE — PROGRESS NOTE ADULT - ATTENDING COMMENTS
Pt. should be discharged with SBP prophylaxis, if he indeed had an SBP in the past. Pt. should be discharged with SBP prophylaxis, if he indeed had an SBP in the past. Records from hospitalizations in our system (paracentesis on 10/09 and 10/22) do not show SBP.

## 2019-12-15 NOTE — PROGRESS NOTE ADULT - SUBJECTIVE AND OBJECTIVE BOX
Chief Complaint: Weakness    Interval Events:   - The patient has no new complaints this morning    Allergies:  codeine (Anaphylaxis)  NO  RED MEAT (Unknown)    MEDICATIONS  (STANDING):  artificial  tears Solution 1 Drop(s) Both EYES three times a day  atorvastatin 10 milliGRAM(s) Oral at bedtime  dextrose 5%. 1000 milliLiter(s) (50 mL/Hr) IV Continuous <Continuous>  dextrose 50% Injectable 12.5 Gram(s) IV Push once  dextrose 50% Injectable 25 Gram(s) IV Push once  dextrose 50% Injectable 25 Gram(s) IV Push once  ertapenem  IVPB      ertapenem  IVPB 1000 milliGRAM(s) IV Intermittent every 24 hours  insulin glargine Injectable (LANTUS) 28 Unit(s) SubCutaneous at bedtime  insulin lispro (HumaLOG) corrective regimen sliding scale   SubCutaneous three times a day before meals  insulin lispro (HumaLOG) corrective regimen sliding scale   SubCutaneous at bedtime  insulin lispro Injectable (HumaLOG) 8 Unit(s) SubCutaneous three times a day before meals  lactulose Syrup 30 Gram(s) Oral every 4 hours  midodrine. 20 milliGRAM(s) Oral three times a day  pantoprazole    Tablet 40 milliGRAM(s) Oral before breakfast  rifAXIMin 550 milliGRAM(s) Oral two times a day  sodium chloride 2 Gram(s) Oral three times a day  sucralfate 1 Gram(s) Oral two times a day  tamsulosin 0.4 milliGRAM(s) Oral at bedtime    MEDICATIONS  (PRN):  dextrose 40% Gel 15 Gram(s) Oral once PRN Blood Glucose LESS THAN 70 milliGRAM(s)/deciliter  glucagon  Injectable 1 milliGRAM(s) IntraMuscular once PRN Glucose LESS THAN 70 milligrams/deciliter    PMHX/PSHX:  GIB (gastrointestinal bleeding)  GERD with esophagitis  Hepatic encephalopathy  Obesity  Fatty liver disease, nonalcoholic  Renal stones  Hypertension  Neuropathy  Hypercholesteremia  Diabetes  S/P cholecystectomy    Family history:  Family history of type 2 diabetes mellitus  Family history of hypertension  Family history of stomach cancer    ROS:     General:  No wt loss, fevers, chills, night sweats, + fatigue  Eyes:  Good vision, no reported pain  ENT:  No sore throat, pain, runny nose, dysphagia  CV:  No pain, palpitations, hypo/hypertension  Pulm:  No dyspnea, cough, tachypnea, wheezing  GI:  No pain, No nausea, No vomiting, No diarrhea, No constipation, No weight loss, No fever, No pruritis, No rectal bleeding, No tarry stools, No dysphagia  :  No pain, bleeding, incontinence, nocturia  Muscle:  No pain, weakness  Neuro:  No weakness, tingling, memory problems  Psych:  No fatigue, insomnia, mood problems, depression  Endocrine:  No polyuria, polydipsia, cold/heat intolerance  Heme:  No petechiae, ecchymosis, easy bruisability  Skin:  No rash, tattoos, scars, edema    PHYSICAL EXAM:   Vital Signs:  Vital Signs Last 24 Hrs  T(C): 36.7 (15 Dec 2019 05:10), Max: 36.8 (14 Dec 2019 12:43)  T(F): 98 (15 Dec 2019 05:10), Max: 98.3 (14 Dec 2019 12:43)  HR: 78 (15 Dec 2019 05:10) (75 - 82)  BP: 108/65 (15 Dec 2019 05:10) (98/62 - 125/70)  BP(mean): --  RR: 18 (15 Dec 2019 05:10) (18 - 18)  SpO2: 99% (15 Dec 2019 05:10) (94% - 99%)  Daily Weight in k (14 Dec 2019 21:03)    GENERAL:  No acute distress obese  HEENT:  Normocephalic/atraumatic,  no scleral icterus  CHEST:  Normal effort, no accessory muscle use  ABDOMEN:  Soft, non-tender, non-distended, normoactive bowel sounds  EXTREMITIES:  No cyanosis, clubbing, or edema  SKIN:  No rash/erythema  NEURO:  Alert and oriented x  3, + asterixis    LABS:                        8.0    3.85  )-----------( 40       ( 14 Dec 2019 11:06 )             23.7     12-15    134<L>  |  98  |  25<H>  ----------------------------<  185<H>  3.8   |  26  |  1.25    Ca    10.5      15 Dec 2019 07:22    TPro  5.7<L>  /  Alb  3.2<L>  /  TBili  8.2<H>  /  DBili  x   /  AST  27  /  ALT  20  /  AlkPhos  111  12-14    LIVER FUNCTIONS - ( 14 Dec 2019 06:18 )  Alb: 3.2 g/dL / Pro: 5.7 g/dL / ALK PHOS: 111 U/L / ALT: 20 U/L / AST: 27 U/L / GGT: x           PT/INR - ( 13 Dec 2019 08:30 )   PT: 26.3 sec;   INR: 2.23 ratio      PTT - ( 13 Dec 2019 08:30 )  PTT:52.5 sec    Imaging:    No new imaging  3. Endocardial visualization enhanced with intravenous  injection of Ultrasonic Enhancing Agent (Definity).  Hyperdynamic left ventricular systolic function.  4. Normal diastolic function.  5. The right ventricle is not well visualized; grossly  normal right ventricular systolic function.  *** No previous Echo exam.  ------------------------------------------------------------------------  Confirmed on  2019 - 16:23:05 by Mark Conway M.D.  ------------------------------------------------------------------------    < end of copied text >

## 2019-12-16 LAB
ALBUMIN SERPL ELPH-MCNC: 3.3 G/DL — SIGNIFICANT CHANGE UP (ref 3.3–5)
ALP SERPL-CCNC: 144 U/L — HIGH (ref 40–120)
ALT FLD-CCNC: 19 U/L — SIGNIFICANT CHANGE UP (ref 10–45)
ANION GAP SERPL CALC-SCNC: 10 MMOL/L — SIGNIFICANT CHANGE UP (ref 5–17)
APTT BLD: 37.9 SEC — HIGH (ref 27.5–36.3)
AST SERPL-CCNC: 32 U/L — SIGNIFICANT CHANGE UP (ref 10–40)
BASOPHILS # BLD AUTO: 0.03 K/UL — SIGNIFICANT CHANGE UP (ref 0–0.2)
BASOPHILS NFR BLD AUTO: 0.7 % — SIGNIFICANT CHANGE UP (ref 0–2)
BILIRUB SERPL-MCNC: 7.1 MG/DL — HIGH (ref 0.2–1.2)
BUN SERPL-MCNC: 24 MG/DL — HIGH (ref 7–23)
CALCIUM SERPL-MCNC: 9.9 MG/DL — SIGNIFICANT CHANGE UP (ref 8.4–10.5)
CHLORIDE SERPL-SCNC: 99 MMOL/L — SIGNIFICANT CHANGE UP (ref 96–108)
CO2 SERPL-SCNC: 26 MMOL/L — SIGNIFICANT CHANGE UP (ref 22–31)
CREAT SERPL-MCNC: 1.14 MG/DL — SIGNIFICANT CHANGE UP (ref 0.5–1.3)
EOSINOPHIL # BLD AUTO: 0.17 K/UL — SIGNIFICANT CHANGE UP (ref 0–0.5)
EOSINOPHIL NFR BLD AUTO: 4.2 % — SIGNIFICANT CHANGE UP (ref 0–6)
GLUCOSE BLDC GLUCOMTR-MCNC: 164 MG/DL — HIGH (ref 70–99)
GLUCOSE BLDC GLUCOMTR-MCNC: 185 MG/DL — HIGH (ref 70–99)
GLUCOSE BLDC GLUCOMTR-MCNC: 207 MG/DL — HIGH (ref 70–99)
GLUCOSE BLDC GLUCOMTR-MCNC: 226 MG/DL — HIGH (ref 70–99)
GLUCOSE SERPL-MCNC: 173 MG/DL — HIGH (ref 70–99)
HCT VFR BLD CALC: 23.1 % — LOW (ref 39–50)
HGB BLD-MCNC: 7.8 G/DL — LOW (ref 13–17)
IMM GRANULOCYTES NFR BLD AUTO: 0.2 % — SIGNIFICANT CHANGE UP (ref 0–1.5)
INR BLD: 2.02 RATIO — HIGH (ref 0.88–1.16)
LYMPHOCYTES # BLD AUTO: 1.35 K/UL — SIGNIFICANT CHANGE UP (ref 1–3.3)
LYMPHOCYTES # BLD AUTO: 33.3 % — SIGNIFICANT CHANGE UP (ref 13–44)
MCHC RBC-ENTMCNC: 33.1 PG — SIGNIFICANT CHANGE UP (ref 27–34)
MCHC RBC-ENTMCNC: 33.8 GM/DL — SIGNIFICANT CHANGE UP (ref 32–36)
MCV RBC AUTO: 97.9 FL — SIGNIFICANT CHANGE UP (ref 80–100)
MONOCYTES # BLD AUTO: 0.44 K/UL — SIGNIFICANT CHANGE UP (ref 0–0.9)
MONOCYTES NFR BLD AUTO: 10.8 % — SIGNIFICANT CHANGE UP (ref 2–14)
NEUTROPHILS # BLD AUTO: 2.06 K/UL — SIGNIFICANT CHANGE UP (ref 1.8–7.4)
NEUTROPHILS NFR BLD AUTO: 50.8 % — SIGNIFICANT CHANGE UP (ref 43–77)
PLATELET # BLD AUTO: 47 K/UL — LOW (ref 150–400)
POTASSIUM SERPL-MCNC: 3.8 MMOL/L — SIGNIFICANT CHANGE UP (ref 3.5–5.3)
POTASSIUM SERPL-SCNC: 3.8 MMOL/L — SIGNIFICANT CHANGE UP (ref 3.5–5.3)
PROT SERPL-MCNC: 5.6 G/DL — LOW (ref 6–8.3)
PROTHROM AB SERPL-ACNC: 23.8 SEC — HIGH (ref 10–12.9)
RBC # BLD: 2.36 M/UL — LOW (ref 4.2–5.8)
RBC # FLD: 18.5 % — HIGH (ref 10.3–14.5)
SODIUM SERPL-SCNC: 135 MMOL/L — SIGNIFICANT CHANGE UP (ref 135–145)
WBC # BLD: 4.06 K/UL — SIGNIFICANT CHANGE UP (ref 3.8–10.5)
WBC # FLD AUTO: 4.06 K/UL — SIGNIFICANT CHANGE UP (ref 3.8–10.5)

## 2019-12-16 PROCEDURE — 99232 SBSQ HOSP IP/OBS MODERATE 35: CPT

## 2019-12-16 PROCEDURE — 99233 SBSQ HOSP IP/OBS HIGH 50: CPT

## 2019-12-16 PROCEDURE — 99232 SBSQ HOSP IP/OBS MODERATE 35: CPT | Mod: GC

## 2019-12-16 RX ORDER — SUCRALFATE 1 G
1 TABLET ORAL
Qty: 0 | Refills: 0 | DISCHARGE

## 2019-12-16 RX ORDER — INSULIN LISPRO 100/ML
10 VIAL (ML) SUBCUTANEOUS
Refills: 0 | Status: DISCONTINUED | OUTPATIENT
Start: 2019-12-16 | End: 2019-12-17

## 2019-12-16 RX ORDER — INFLUENZA VIRUS VACCINE 15; 15; 15; 15 UG/.5ML; UG/.5ML; UG/.5ML; UG/.5ML
0.5 SUSPENSION INTRAMUSCULAR ONCE
Refills: 0 | Status: COMPLETED | OUTPATIENT
Start: 2019-12-16 | End: 2019-12-17

## 2019-12-16 RX ORDER — PNEUMOCOCCAL 13-VALENT CONJUGATE VACCINE 2.2; 2.2; 2.2; 2.2; 2.2; 4.4; 2.2; 2.2; 2.2; 2.2; 2.2; 2.2; 2.2 UG/.5ML; UG/.5ML; UG/.5ML; UG/.5ML; UG/.5ML; UG/.5ML; UG/.5ML; UG/.5ML; UG/.5ML; UG/.5ML; UG/.5ML; UG/.5ML; UG/.5ML
0.5 INJECTION, SUSPENSION INTRAMUSCULAR ONCE
Refills: 0 | Status: COMPLETED | OUTPATIENT
Start: 2019-12-16 | End: 2019-12-16

## 2019-12-16 RX ADMIN — Medication 2: at 13:20

## 2019-12-16 RX ADMIN — Medication 10 UNIT(S): at 18:01

## 2019-12-16 RX ADMIN — INSULIN GLARGINE 28 UNIT(S): 100 INJECTION, SOLUTION SUBCUTANEOUS at 22:09

## 2019-12-16 RX ADMIN — Medication 1 DROP(S): at 22:09

## 2019-12-16 RX ADMIN — Medication 1 GRAM(S): at 05:44

## 2019-12-16 RX ADMIN — Medication 1: at 09:04

## 2019-12-16 RX ADMIN — Medication 2: at 18:01

## 2019-12-16 RX ADMIN — MIDODRINE HYDROCHLORIDE 20 MILLIGRAM(S): 2.5 TABLET ORAL at 18:01

## 2019-12-16 RX ADMIN — PNEUMOCOCCAL 13-VALENT CONJUGATE VACCINE 0.5 MILLILITER(S): 2.2; 2.2; 2.2; 2.2; 2.2; 4.4; 2.2; 2.2; 2.2; 2.2; 2.2; 2.2; 2.2 INJECTION, SUSPENSION INTRAMUSCULAR at 18:28

## 2019-12-16 RX ADMIN — PANTOPRAZOLE SODIUM 40 MILLIGRAM(S): 20 TABLET, DELAYED RELEASE ORAL at 05:44

## 2019-12-16 RX ADMIN — Medication 1 DROP(S): at 13:21

## 2019-12-16 RX ADMIN — Medication 8 UNIT(S): at 13:21

## 2019-12-16 RX ADMIN — Medication 8 UNIT(S): at 09:04

## 2019-12-16 RX ADMIN — Medication 1 DROP(S): at 05:44

## 2019-12-16 RX ADMIN — MIDODRINE HYDROCHLORIDE 20 MILLIGRAM(S): 2.5 TABLET ORAL at 05:44

## 2019-12-16 RX ADMIN — Medication 1 GRAM(S): at 18:01

## 2019-12-16 RX ADMIN — ATORVASTATIN CALCIUM 10 MILLIGRAM(S): 80 TABLET, FILM COATED ORAL at 22:09

## 2019-12-16 RX ADMIN — MIDODRINE HYDROCHLORIDE 20 MILLIGRAM(S): 2.5 TABLET ORAL at 13:21

## 2019-12-16 RX ADMIN — TAMSULOSIN HYDROCHLORIDE 0.4 MILLIGRAM(S): 0.4 CAPSULE ORAL at 22:09

## 2019-12-16 NOTE — PROGRESS NOTE ADULT - SUBJECTIVE AND OBJECTIVE BOX
Chief Complaint:  Patient is a 64y old  Male who presents with a chief complaint of difficulty to ambulate/ weakness (15 Dec 2019 16:27)      Interval Events:     Allergies:  codeine (Anaphylaxis)  NO  RED MEAT (Unknown)      Hospital Medications:  artificial  tears Solution 1 Drop(s) Both EYES three times a day  atorvastatin 10 milliGRAM(s) Oral at bedtime  dextrose 40% Gel 15 Gram(s) Oral once PRN  dextrose 5%. 1000 milliLiter(s) IV Continuous <Continuous>  dextrose 50% Injectable 12.5 Gram(s) IV Push once  dextrose 50% Injectable 25 Gram(s) IV Push once  dextrose 50% Injectable 25 Gram(s) IV Push once  glucagon  Injectable 1 milliGRAM(s) IntraMuscular once PRN  insulin glargine Injectable (LANTUS) 28 Unit(s) SubCutaneous at bedtime  insulin lispro (HumaLOG) corrective regimen sliding scale   SubCutaneous three times a day before meals  insulin lispro (HumaLOG) corrective regimen sliding scale   SubCutaneous at bedtime  insulin lispro Injectable (HumaLOG) 8 Unit(s) SubCutaneous three times a day before meals  lactulose Syrup 30 Gram(s) Oral every 4 hours  midodrine. 20 milliGRAM(s) Oral three times a day  pantoprazole    Tablet 40 milliGRAM(s) Oral before breakfast  rifAXIMin 550 milliGRAM(s) Oral two times a day  sucralfate 1 Gram(s) Oral two times a day  tamsulosin 0.4 milliGRAM(s) Oral at bedtime      PMHX/PSHX:  GIB (gastrointestinal bleeding)  GERD with esophagitis  Hepatic encephalopathy  Obesity  Fatty liver disease, nonalcoholic  Renal stones  Hypertension  Neuropathy  Hypercholesteremia  Diabetes  S/P cholecystectomy  No significant past surgical history      Family history:  Family history of type 2 diabetes mellitus  Family history of hypertension  Family history of stomach cancer  No pertinent family history in first degree relatives      ROS:     General:  No wt loss, fevers, chills, night sweats, fatigue,   Eyes:  Good vision, no reported pain  ENT:  No sore throat, pain, runny nose, dysphagia  CV:  No pain, palpitations, hypo/hypertension  Resp:  No dyspnea, cough, tachypnea, wheezing  GI:  See HPI  :  No pain, bleeding, incontinence, nocturia  Muscle:  No pain, weakness  Neuro:  No weakness, tingling, memory problems  Psych:  No fatigue, insomnia, mood problems, depression  Endocrine:  No polyuria, polydipsia, cold/heat intolerance  Heme:  No petechiae, ecchymosis, easy bruisability  Skin:  No rash, edema      PHYSICAL EXAM:     GENERAL:  Appears stated age, well-groomed, well-nourished, no distress  HEENT:  NC/AT,  conjunctivae clear, sclera -anicteric  CHEST:  Full & symmetric excursion, no increased effort, breath sounds clear  HEART:  Regular rhythm, S1, S2, no murmur/rub/S3/S4,  no edema  ABDOMEN:  Soft, non-tender, non-distended, normoactive bowel sounds,  no masses ,no hepato-splenomegaly,   EXTREMITIES:  no cyanosis,clubbing or edema  SKIN:  No rash/erythema/ecchymoses/petechiae/wounds/abscess/warm/dry  NEURO:  Alert, oriented    Vital Signs:  Vital Signs Last 24 Hrs  T(C): 36.8 (16 Dec 2019 04:03), Max: 36.8 (16 Dec 2019 04:03)  T(F): 98.3 (16 Dec 2019 04:03), Max: 98.3 (16 Dec 2019 04:03)  HR: 77 (16 Dec 2019 04:03) (69 - 78)  BP: 100/57 (16 Dec 2019 04:03) (100/57 - 123/69)  BP(mean): --  RR: 18 (16 Dec 2019 04:03) (18 - 18)  SpO2: 96% (16 Dec 2019 04:03) (96% - 98%)  Daily     Daily     LABS:                        8.4    4.08  )-----------( 43       ( 15 Dec 2019 11:22 )             25.2     12-16    135  |  99  |  24<H>  ----------------------------<  173<H>  3.8   |  26  |  1.14    Ca    9.9      16 Dec 2019 06:20    TPro  5.6<L>  /  Alb  3.3  /  TBili  7.1<H>  /  DBili  x   /  AST  32  /  ALT  19  /  AlkPhos  144<H>  12-16    LIVER FUNCTIONS - ( 16 Dec 2019 06:20 )  Alb: 3.3 g/dL / Pro: 5.6 g/dL / ALK PHOS: 144 U/L / ALT: 19 U/L / AST: 32 U/L / GGT: x                   Imaging: Chief Complaint:  Patient is a 64y old  Male who presents with a chief complaint of difficulty to ambulate/ weakness (15 Dec 2019 16:27)      Interval Events: Patient feeling well.  Did walk a little bit this weekend.    Allergies:  codeine (Anaphylaxis)  NO  RED MEAT (Unknown)      Hospital Medications:  artificial  tears Solution 1 Drop(s) Both EYES three times a day  atorvastatin 10 milliGRAM(s) Oral at bedtime  dextrose 40% Gel 15 Gram(s) Oral once PRN  dextrose 5%. 1000 milliLiter(s) IV Continuous <Continuous>  dextrose 50% Injectable 12.5 Gram(s) IV Push once  dextrose 50% Injectable 25 Gram(s) IV Push once  dextrose 50% Injectable 25 Gram(s) IV Push once  glucagon  Injectable 1 milliGRAM(s) IntraMuscular once PRN  insulin glargine Injectable (LANTUS) 28 Unit(s) SubCutaneous at bedtime  insulin lispro (HumaLOG) corrective regimen sliding scale   SubCutaneous three times a day before meals  insulin lispro (HumaLOG) corrective regimen sliding scale   SubCutaneous at bedtime  insulin lispro Injectable (HumaLOG) 8 Unit(s) SubCutaneous three times a day before meals  lactulose Syrup 30 Gram(s) Oral every 4 hours  midodrine. 20 milliGRAM(s) Oral three times a day  pantoprazole    Tablet 40 milliGRAM(s) Oral before breakfast  rifAXIMin 550 milliGRAM(s) Oral two times a day  sucralfate 1 Gram(s) Oral two times a day  tamsulosin 0.4 milliGRAM(s) Oral at bedtime      PMHX/PSHX:  GIB (gastrointestinal bleeding)  GERD with esophagitis  Hepatic encephalopathy  Obesity  Fatty liver disease, nonalcoholic  Renal stones  Hypertension  Neuropathy  Hypercholesteremia  Diabetes  S/P cholecystectomy  No significant past surgical history      Family history:  Family history of type 2 diabetes mellitus  Family history of hypertension  Family history of stomach cancer  No pertinent family history in first degree relatives      ROS:     General:  No wt loss, fevers, chills, night sweats, fatigue,   Eyes:  Good vision, no reported pain  ENT:  No sore throat, pain, runny nose, dysphagia  CV:  No pain, palpitations, hypo/hypertension  Resp:  No dyspnea, cough, tachypnea, wheezing  GI:  See HPI  :  No pain, bleeding, incontinence, nocturia  Muscle:  No pain, weakness  Neuro:  No weakness, tingling, memory problems  Psych:  No fatigue, insomnia, mood problems, depression  Endocrine:  No polyuria, polydipsia, cold/heat intolerance  Heme:  No petechiae, ecchymosis, easy bruisability  Skin:  No rash, edema      PHYSICAL EXAM:     GENERAL: NAD  HEENT:  NC/AT  CHEST:  Full & symmetric excursion, no increased effort  ABDOMEN:  Soft, obese non-tender, non-distended  EXTREMITIES:  no edema  SKIN:  +jaundice  NEURO:  Alert      Vital Signs:  Vital Signs Last 24 Hrs  T(C): 36.8 (16 Dec 2019 04:03), Max: 36.8 (16 Dec 2019 04:03)  T(F): 98.3 (16 Dec 2019 04:03), Max: 98.3 (16 Dec 2019 04:03)  HR: 77 (16 Dec 2019 04:03) (69 - 78)  BP: 100/57 (16 Dec 2019 04:03) (100/57 - 123/69)  BP(mean): --  RR: 18 (16 Dec 2019 04:03) (18 - 18)  SpO2: 96% (16 Dec 2019 04:03) (96% - 98%)  Daily     Daily     LABS:                        8.4    4.08  )-----------( 43       ( 15 Dec 2019 11:22 )             25.2     12-16    135  |  99  |  24<H>  ----------------------------<  173<H>  3.8   |  26  |  1.14    Ca    9.9      16 Dec 2019 06:20    TPro  5.6<L>  /  Alb  3.3  /  TBili  7.1<H>  /  DBili  x   /  AST  32  /  ALT  19  /  AlkPhos  144<H>  12-16    LIVER FUNCTIONS - ( 16 Dec 2019 06:20 )  Alb: 3.3 g/dL / Pro: 5.6 g/dL / ALK PHOS: 144 U/L / ALT: 19 U/L / AST: 32 U/L / GGT: x                   Imaging:  reviewed

## 2019-12-16 NOTE — PROGRESS NOTE ADULT - SUBJECTIVE AND OBJECTIVE BOX
Follow Up:      Interval History:    REVIEW OF SYSTEMS  [  ] ROS unobtainable because:    [  ] All other systems negative except as noted below    Constitutional:  [ ] fever [ ] chills  [ ] weight loss  [ ] weakness  Skin:  [ ] rash [ ] phlebitis	  Eyes: [ ] icterus [ ] pain  [ ] discharge	  ENMT: [ ] sore throat  [ ] thrush [ ] ulcers [ ] exudates  Respiratory: [ ] dyspnea [ ] hemoptysis [ ] cough [ ] sputum	  Cardiovascular:  [ ] chest pain [ ] palpitations [ ] edema	  Gastrointestinal:  [ ] nausea [ ] vomiting [ ] diarrhea [ ] constipation [ ] pain	  Genitourinary:  [ ] dysuria [ ] frequency [ ] hematuria [ ] discharge [ ] flank pain  [ ] incontinence  Musculoskeletal:  [ ] myalgias [ ] arthralgias [ ] arthritis  [ ] back pain  Neurological:  [ ] headache [ ] seizures  [ ] confusion/altered mental status    Allergies  codeine (Anaphylaxis)        ANTIMICROBIALS:  rifAXIMin 550 two times a day      OTHER MEDS:  MEDICATIONS  (STANDING):  atorvastatin 10 at bedtime  dextrose 40% Gel 15 once PRN  dextrose 50% Injectable 12.5 once  dextrose 50% Injectable 25 once  dextrose 50% Injectable 25 once  glucagon  Injectable 1 once PRN  insulin glargine Injectable (LANTUS) 28 at bedtime  insulin lispro (HumaLOG) corrective regimen sliding scale  three times a day before meals  insulin lispro (HumaLOG) corrective regimen sliding scale  at bedtime  insulin lispro Injectable (HumaLOG) 8 three times a day before meals  lactulose Syrup 30 every 4 hours  midodrine. 20 three times a day  pantoprazole    Tablet 40 before breakfast  sucralfate 1 two times a day  tamsulosin 0.4 at bedtime      Vital Signs Last 24 Hrs  T(C): 36.8 (16 Dec 2019 04:03), Max: 36.8 (16 Dec 2019 04:03)  T(F): 98.3 (16 Dec 2019 04:03), Max: 98.3 (16 Dec 2019 04:03)  HR: 77 (16 Dec 2019 04:03) (69 - 78)  BP: 100/57 (16 Dec 2019 04:03) (100/57 - 123/69)  BP(mean): --  RR: 18 (16 Dec 2019 04:03) (18 - 18)  SpO2: 96% (16 Dec 2019 04:03) (96% - 98%)    PHYSICAL EXAMINATION:  General: Alert and Awake, NAD  HEENT: PERRL, EOMI  Neck: Supple  Cardiac: RRR, No M/R/G  Resp: CTAB, No Wh/Rh/Ra  Abdomen: NBS, NT/ND, No HSM, No rigidity or guarding  MSK: No LE edema. No Calf tenderness  : No bob  Skin: No rashes or lesions. Skin is warm and dry to the touch.   Neuro: Alert and Awake. CN 2-12 Grossly intact. Moves all four extremities spontaneously.  Psych: Calm, Pleasant, Cooperative                          7.8    4.06  )-----------( 47       ( 16 Dec 2019 08:21 )             23.1       12-16    135  |  99  |  24<H>  ----------------------------<  173<H>  3.8   |  26  |  1.14    Ca    9.9      16 Dec 2019 06:20    TPro  5.6<L>  /  Alb  3.3  /  TBili  7.1<H>  /  DBili  x   /  AST  32  /  ALT  19  /  AlkPhos  144<H>  12-16      TRANSPLANT CLEARANCE LABWORK    Treponema Pallidum Antibody Interpretation: Negative (12-04-19 @ 08:33)  Treponema Pallidum Antibody Interpretation: Negative (12-03-19 @ 09:01)    Quantiferon TB Plus: NEGATIVE (12-04-19 @ 08:38)  Quantiferon TB Plus Nil: 0.16 IU/mL (12-04-19 @ 08:38)  Quantiferon TB Plus TB1 minus Nil: 0.00 IU/mL (12-04-19 @ 08:38)  Quantiferon TB Plus TB2 minus Nil: 0.01 IU/mL (12-04-19 @ 08:38)  Quantiferon TB Plus: NEGATIVE (12-03-19 @ 08:51)  Quantiferon TB Plus Nil: 0.03 IU/mL (12-03-19 @ 08:51)  Quantiferon TB Plus TB1 minus Nil: 0.00 IU/mL (12-03-19 @ 08:51)  Quantiferon TB Plus TB2 minus Nil: 0.00 IU/mL (12-03-19 @ 08:51)     EBV VCA IgM EIA: <10.0 U/mL (12-04-19 @ 08:33)  EBV VCA IgG EIA: >750.0 U/mL (12-04-19 @ 08:33)  EBV EA Ab EIA: <5.0 U/mL (12-04-19 @ 08:33)    CMV IgG Antibody: <0.20 U/mL (12-04-19 @ 08:33)  CMV IgG Interpretation: Negative (12-04-19 @ 08:33)     Herpes simplex 1 IgG Ab Result: 37.20 Index (12-04-19 @ 08:33)  Herpes simplex 1 IgG Ab Interp: Positive (12-04-19 @ 08:33)  Herpes simplex 2 IgG Ab Result: 0.20 Index (12-04-19 @ 08:33)  Herpes simplex 2 IgG Ab Interp: Negative (12-04-19 @ 08:33)    Varicella IgG Antibody: 2083.0 Index (12-04-19 @ 08:33)  Varicella IgG Interpretation: Positive (12-04-19 @ 08:33)    Hepatitis A IgG Ab Result: Reactive (12-04-19 @ 08:33)    Hepatitis B Core Antibody, Total: Nonreact (12-04-19 @ 08:33)  Hepatitis B Surface Antibody: Nonreact (12-04-19 @ 08:33)  Hepatitis B Surface Antigen: Nonreact (12-04-19 @ 08:33)  Hepatitis B Surface AB Quantitative: <3.0 mIU/mL (12-04-19 @ 08:33)  Hepatitis B Surface Antigen: Nonreact (12-03-19 @ 09:01)  Hepatitis B Core IgM Antibody: Nonreact (12-03-19 @ 09:01)  Hepatitis B Surface Antigen: Nonreact (11-27-19 @ 00:11)  Hepatitis B Core IgM Antibody: Nonreact (11-27-19 @ 00:11)  Hepatitis B Core IgM Antibody: Nonreact (10-23-19 @ 17:56)  Hepatitis B Surface Antigen: Nonreact (10-23-19 @ 17:56)    Hepatitis C RNA, Qualitative: NotDetec (12-04-19 @ 08:38)  Hepatitis C RNA Qualitative Interp: HCV RNA Qualitative assay by RT-PCR using Alfaro m2000  The limit of detection: 12 IU/mL.  Result Interpretation:  Not Detected:  HCV RNA is not detected; possibly indicates resolved past  infection or a false reactive antibody result.  Detected:        HCV RNA is detected; indicates hepatitis C virus  infection.  CDC guidelines and [facility] require confirmation of reactive  hepatitis C antibody screening tests. Results should be viewed in the  context of clinical history and other labtests. An indeterminate result  indicates that HCV RNA detection cannot be resolved, please consider  retesting with a new sample. The performance characteristics of this  assay have been determined by [facility]. The U.S. Food and  Drug Administration (FDA) has not approved or cleared this test, however,  FDA clearance or approval is not currently required for clinical use. (12-04-19 @ 08:38)  Hepatitis C Virus S/CO Ratio: 0.14 S/CO (12-04-19 @ 08:33)  Hepatitis C Virus Interpretation: Nonreact (12-04-19 @ 08:33)  Hepatitis C Virus S/CO Ratio: 0.12 S/CO (12-03-19 @ 09:01)  Hepatitis C Virus Interpretation: Nonreact (12-03-19 @ 09:01)  Hepatitis C Virus S/CO Ratio: 0.16 S/CO (11-27-19 @ 00:11)  Hepatitis C Virus Interpretation: Nonreact (11-27-19 @ 00:11)  Hepatitis C Virus S/CO Ratio: 0.15 S/CO (10-23-19 @ 17:56)  Hepatitis C Virus Interpretation: Nonreact (10-23-19 @ 17:56)  Hepatitis C Virus S/CO Ratio: 0.14 S/CO (04-03-19 @ 16:27)  Hepatitis C Virus Interpretation: Nonreact (04-03-19 @ 16:27)    Rubella Virus IgG Antibody.: 30.0 Index (12-04-19 @ 08:33)  Rubella IgG Interp: Positive (12-04-19 @ 08:33)    Toxoplasma IgG Screen: <3.0 IU/mL (12-04-19 @ 08:33)  Toxoplasma IgG Interpretation: Negative (12-04-19 @ 08:33)    MICROBIOLOGY:  v  .Urine Clean Catch (Midstream)  12-10-19   >100,000 CFU/ml Escherichia coli ESBL  >100,000 CFU/ml Klebsiella pneumoniae  --  Escherichia coli ESBL  Klebsiella pneumoniae      .Urine Clean Catch (Midstream)  12-06-19   >100,000 CFU/ml Escherichia coli ESBL  >100,000 CFU/ml Klebsiella pneumoniae  --  Gram Negative Rods  Gram Negative Rods      .Blood Blood-Peripheral  11-27-19   No growth at 5 days.  --  --      .Urine Clean Catch (Midstream)  11-26-19   <10,000 CFU/mL Normal Urogenital Shantel  --  --        CMV IgG Antibody: <0.20 U/mL (12-04-19 @ 08:33)  Toxoplasma IgG Screen: <3.0 IU/mL (12-04-19 @ 08:33)          RADIOLOGY:    No new imaging for review at time of assessment. Follow Up:  UTI, Pre-LT Evaluation    Interval History: no chills or fevers. Denies N/V/D. Denies abdominal pain. Denies urinary frequency or hesitancy.     Social History: Born in the [place]. No prolonged travel outside of the . Last travel outside of  to [facility] for 1 week in 2016. Lived in [facility] ([place] for 3 months. Denies history of incarceration or homelessness. Worked in construction.     REVIEW OF SYSTEMS  [  ] ROS unobtainable because:    [ x ] All other systems negative except as noted below    Constitutional:  [ ] fever [ ] chills  [ ] weight loss  [ ] weakness  Skin:  [ ] rash [ ] phlebitis	  Eyes: [ ] icterus [ ] pain  [ ] discharge	  ENMT: [ ] sore throat  [ ] thrush [ ] ulcers [ ] exudates  Respiratory: [ ] dyspnea [ ] hemoptysis [ ] cough [ ] sputum	  Cardiovascular:  [ ] chest pain [ ] palpitations [ ] edema	  Gastrointestinal:  [ ] nausea [ ] vomiting [ ] diarrhea [ ] constipation [ ] pain	  Genitourinary:  [ ] dysuria [ ] frequency [ ] hematuria [ ] discharge [ ] flank pain  [ ] incontinence  Musculoskeletal:  [ ] myalgias [ ] arthralgias [ ] arthritis  [ ] back pain  Neurological:  [ ] headache [ ] seizures  [ ] confusion/altered mental status    Allergies  codeine (Anaphylaxis)        ANTIMICROBIALS:  rifAXIMin 550 two times a day      OTHER MEDS:  MEDICATIONS  (STANDING):  atorvastatin 10 at bedtime  dextrose 40% Gel 15 once PRN  dextrose 50% Injectable 12.5 once  dextrose 50% Injectable 25 once  dextrose 50% Injectable 25 once  glucagon  Injectable 1 once PRN  insulin glargine Injectable (LANTUS) 28 at bedtime  insulin lispro (HumaLOG) corrective regimen sliding scale  three times a day before meals  insulin lispro (HumaLOG) corrective regimen sliding scale  at bedtime  insulin lispro Injectable (HumaLOG) 8 three times a day before meals  lactulose Syrup 30 every 4 hours  midodrine. 20 three times a day  pantoprazole    Tablet 40 before breakfast  sucralfate 1 two times a day  tamsulosin 0.4 at bedtime      Vital Signs Last 24 Hrs  T(C): 36.8 (16 Dec 2019 04:03), Max: 36.8 (16 Dec 2019 04:03)  T(F): 98.3 (16 Dec 2019 04:03), Max: 98.3 (16 Dec 2019 04:03)  HR: 77 (16 Dec 2019 04:03) (69 - 78)  BP: 100/57 (16 Dec 2019 04:03) (100/57 - 123/69)  BP(mean): --  RR: 18 (16 Dec 2019 04:03) (18 - 18)  SpO2: 96% (16 Dec 2019 04:03) (96% - 98%)    PHYSICAL EXAMINATION:  General: Alert and Awake, NAD, Jandice  HEENT: PERRL, EOMI, Scleral Icterus  Neck: Supple  Cardiac: RRR, No M/R/G  Resp: CTAB, No Wh/Rh/Ra  Abdomen: NBS, NT/ND, No HSM, No rigidity or guarding  MSK: No LE edema. No Calf tenderness  : No bob  Skin: No rashes or lesions. Skin is warm and dry to the touch.   Neuro: Alert and Awake. CN 2-12 Grossly intact. Moves all four extremities spontaneously.  Psych: Calm, Pleasant, Cooperative                          7.8    4.06  )-----------( 47       ( 16 Dec 2019 08:21 )             23.1       12-16    135  |  99  |  24<H>  ----------------------------<  173<H>  3.8   |  26  |  1.14    Ca    9.9      16 Dec 2019 06:20    TPro  5.6<L>  /  Alb  3.3  /  TBili  7.1<H>  /  DBili  x   /  AST  32  /  ALT  19  /  AlkPhos  144<H>  12-16      TRANSPLANT CLEARANCE LABWORK    Treponema Pallidum Antibody Interpretation: Negative (12-04-19 @ 08:33)  Treponema Pallidum Antibody Interpretation: Negative (12-03-19 @ 09:01)    Quantiferon TB Plus: NEGATIVE (12-04-19 @ 08:38)  Quantiferon TB Plus Nil: 0.16 IU/mL (12-04-19 @ 08:38)  Quantiferon TB Plus TB1 minus Nil: 0.00 IU/mL (12-04-19 @ 08:38)  Quantiferon TB Plus TB2 minus Nil: 0.01 IU/mL (12-04-19 @ 08:38)  Quantiferon TB Plus: NEGATIVE (12-03-19 @ 08:51)  Quantiferon TB Plus Nil: 0.03 IU/mL (12-03-19 @ 08:51)  Quantiferon TB Plus TB1 minus Nil: 0.00 IU/mL (12-03-19 @ 08:51)  Quantiferon TB Plus TB2 minus Nil: 0.00 IU/mL (12-03-19 @ 08:51)     EBV VCA IgM EIA: <10.0 U/mL (12-04-19 @ 08:33)  EBV VCA IgG EIA: >750.0 U/mL (12-04-19 @ 08:33)  EBV EA Ab EIA: <5.0 U/mL (12-04-19 @ 08:33)    CMV IgG Antibody: <0.20 U/mL (12-04-19 @ 08:33)  CMV IgG Interpretation: Negative (12-04-19 @ 08:33)     Herpes simplex 1 IgG Ab Result: 37.20 Index (12-04-19 @ 08:33)  Herpes simplex 1 IgG Ab Interp: Positive (12-04-19 @ 08:33)  Herpes simplex 2 IgG Ab Result: 0.20 Index (12-04-19 @ 08:33)  Herpes simplex 2 IgG Ab Interp: Negative (12-04-19 @ 08:33)    Varicella IgG Antibody: 2083.0 Index (12-04-19 @ 08:33)  Varicella IgG Interpretation: Positive (12-04-19 @ 08:33)    Hepatitis A IgG Ab Result: Reactive (12-04-19 @ 08:33)    Hepatitis B Core Antibody, Total: Nonreact (12-04-19 @ 08:33)  Hepatitis B Surface Antibody: Nonreact (12-04-19 @ 08:33)  Hepatitis B Surface Antigen: Nonreact (12-04-19 @ 08:33)  Hepatitis B Surface AB Quantitative: <3.0 mIU/mL (12-04-19 @ 08:33)  Hepatitis B Surface Antigen: Nonreact (12-03-19 @ 09:01)  Hepatitis B Core IgM Antibody: Nonreact (12-03-19 @ 09:01)  Hepatitis B Surface Antigen: Nonreact (11-27-19 @ 00:11)  Hepatitis B Core IgM Antibody: Nonreact (11-27-19 @ 00:11)  Hepatitis B Core IgM Antibody: Nonreact (10-23-19 @ 17:56)  Hepatitis B Surface Antigen: Nonreact (10-23-19 @ 17:56)    Hepatitis C RNA, Qualitative: NotDetec (12-04-19 @ 08:38)  Hepatitis C RNA Qualitative Interp: HCV RNA Qualitative assay by RT-PCR using Alfaro m2000  The limit of detection: 12 IU/mL.  Result Interpretation:  Not Detected:  HCV RNA is not detected; possibly indicates resolved past  infection or a false reactive antibody result.  Detected:        HCV RNA is detected; indicates hepatitis C virus  infection.  CDC guidelines and [facility] require confirmation of reactive  hepatitis C antibody screening tests. Results should be viewed in the  context of clinical history and other labtests. An indeterminate result  indicates that HCV RNA detection cannot be resolved, please consider  retesting with a new sample. The performance characteristics of this  assay have been determined by [facility]. The U.S. Food and  Drug Administration (FDA) has not approved or cleared this test, however,  FDA clearance or approval is not currently required for clinical use. (12-04-19 @ 08:38)  Hepatitis C Virus S/CO Ratio: 0.14 S/CO (12-04-19 @ 08:33)  Hepatitis C Virus Interpretation: Nonreact (12-04-19 @ 08:33)  Hepatitis C Virus S/CO Ratio: 0.12 S/CO (12-03-19 @ 09:01)  Hepatitis C Virus Interpretation: Nonreact (12-03-19 @ 09:01)  Hepatitis C Virus S/CO Ratio: 0.16 S/CO (11-27-19 @ 00:11)  Hepatitis C Virus Interpretation: Nonreact (11-27-19 @ 00:11)  Hepatitis C Virus S/CO Ratio: 0.15 S/CO (10-23-19 @ 17:56)  Hepatitis C Virus Interpretation: Nonreact (10-23-19 @ 17:56)  Hepatitis C Virus S/CO Ratio: 0.14 S/CO (04-03-19 @ 16:27)  Hepatitis C Virus Interpretation: Nonreact (04-03-19 @ 16:27)    Rubella Virus IgG Antibody.: 30.0 Index (12-04-19 @ 08:33)  Rubella IgG Interp: Positive (12-04-19 @ 08:33)    Toxoplasma IgG Screen: <3.0 IU/mL (12-04-19 @ 08:33)  Toxoplasma IgG Interpretation: Negative (12-04-19 @ 08:33)    MICROBIOLOGY:  v  .Urine Clean Catch (Midstream)  12-10-19   >100,000 CFU/ml Escherichia coli ESBL  >100,000 CFU/ml Klebsiella pneumoniae  --  Escherichia coli ESBL  Klebsiella pneumoniae      .Urine Clean Catch (Midstream)  12-06-19   >100,000 CFU/ml Escherichia coli ESBL  >100,000 CFU/ml Klebsiella pneumoniae  --  Gram Negative Rods  Gram Negative Rods      .Blood Blood-Peripheral  11-27-19   No growth at 5 days.  --  --    .Urine Clean Catch (Midstream)  11-26-19   <10,000 CFU/mL Normal Urogenital Shantel  --  --    CMV IgG Antibody: <0.20 U/mL (12-04-19 @ 08:33)  Toxoplasma IgG Screen: <3.0 IU/mL (12-04-19 @ 08:33)    RADIOLOGY:    EXAM:  MR ABDOMEN WAW IC                        PROCEDURE DATE:  12/06/2019    Cirrhosis with evidence of portal hypertension.  No focal hepatic lesion.  Standard hepatic arterial anatomy with widely patent celiac axis and SMA.   SMV and portal veins are patent as above.

## 2019-12-16 NOTE — PROGRESS NOTE ADULT - ATTENDING COMMENTS
Clinically doing well but remains deconditioned and will need continued PT when discharged to improve/optimize his functional status and strength pre-transplant and avoid recurrent falls. Not encephalopathic today. He is UNOS listed for liver transplant (at [facility]) with MELD-Na 27 (12/13/19), blood type A.

## 2019-12-16 NOTE — PROGRESS NOTE ADULT - ASSESSMENT
#ESBL E coli UTI - s/p 5 days of Ertapenem  --Continue to monitor off of antibiotics    #Pre-LT Evaluation  --For danilo-operative antibiotics will require Meropenem and Vancomycin x1  --Recommend Measles, Mumps IgG     #Immunization  --Will require Hepatitis B vaccination 63M w/ pmh HTN, HLD, DM, non-alcoholic cirrhosis, chronic thrombocytopenia -- sent from rehab for worsening jaundice, dizzy/weakness/falls x 4 days.  h/o strep bacteremia 10/19 s/p RX  12/6 urine Cx with E coli and Klebsiella-ESBL  repeat Cx with same isolates   No bob  No other systemic symptoms though ? confused/encephalopathic  ? colonization/contamination vs UTI  s/p 5 days of Ertapenem for UTI    Overall, E coli UTI, ESBL Colonization, Pre-LT Evaluation, Encounter to Vaccinate Patient    #Pre-LT Evaluation  --No absolute ID contraindications for liver transplant  --For danilo-operative antibiotics will require Meropenem and Vancomycin x1  --Recommend Measles, Mumps IgG   --Recommend Coccidiodes Antibody (resided in [facility] for ~3 months)  --Patient was advised to follow up with me in the Infectious Diseases Office in 4 weeks time. Patient was provided with my office contact number of [phone]    #ESBL E coli UTI - s/p 5 days of Ertapenem  --Continue to monitor off of antibiotics    #Encounter to Vaccinate Patient  --Recommend Influenza and Prevnar Vaccination prior to discharge to rehab  --Will require PPSV23 in 8 weeks time  --Will require Hepatitis B vaccination (will address after discharge)    I will continue to follow. Please feel free to contact me with any further questions.    Eusebio Pérez M.D.  [facility] Division of Infectious Disease  8AM-5PM: Pager Number [phone]  After Hours (or if no response): Please contact the Infectious Diseases Office at [phone]

## 2019-12-16 NOTE — PROGRESS NOTE ADULT - SUBJECTIVE AND OBJECTIVE BOX
Patient is a 63y Male whom presented to the hospital with ckd and dena   pateint seen and examined nad , no fever , no chills      PAST MEDICAL & SURGICAL HISTORY:  GIB (gastrointestinal bleeding)  GERD with esophagitis: Gastritis &amp; Non Bleeding Ulcers  Hepatic encephalopathy  Obesity  Fatty liver disease, nonalcoholic  Renal stones: 25 years ago  Hypertension  Neuropathy  Hypercholesteremia  Diabetes  S/P cholecystectomy      MEDICATIONS  (STANDING):  dextrose 5%. 1000 milliLiter(s) (50 mL/Hr) IV Continuous <Continuous>  dextrose 50% Injectable 12.5 Gram(s) IV Push once  dextrose 50% Injectable 25 Gram(s) IV Push once  dextrose 50% Injectable 25 Gram(s) IV Push once  insulin lispro (HumaLOG) corrective regimen sliding scale   SubCutaneous three times a day before meals  insulin lispro (HumaLOG) corrective regimen sliding scale   SubCutaneous at bedtime      Allergies    codeine (Anaphylaxis)    Intolerances    NO  RED MEAT (Unknown)                                     SOCIAL HISTORY:  Denies ETOh,Smoking,     FAMILY HISTORY:  Family history of type 2 diabetes mellitus  Family history of hypertension  Family history of stomach cancer      REVIEW OF SYSTEMS:    CONSTITUTIONAL: No weakness, fevers or chills  RESPIRATORY: No cough, wheezing, hemoptysis; No shortness of breath  CARDIOVASCULAR: No chest pain or palpitations  GASTROINTESTINAL: No abdominal or epigastric pain. No nausea, vomiting,     No diarrhea or constipation. No melena   GENITOURINARY: No dysuria, frequency or hematuria  NEUROLOGICAL: No numbness or weakness  SKIN: dry                                                   8.4    4.08  )-----------( 43       ( 15 Dec 2019 11:22 )             25.2       CBC Full  -  ( 15 Dec 2019 11:22 )  WBC Count : 4.08 K/uL  RBC Count : 2.56 M/uL  Hemoglobin : 8.4 g/dL  Hematocrit : 25.2 %  Platelet Count - Automated : 43 K/uL  Mean Cell Volume : 98.4 fl  Mean Cell Hemoglobin : 32.8 pg  Mean Cell Hemoglobin Concentration : 33.3 gm/dL  Auto Neutrophil # : x  Auto Lymphocyte # : x  Auto Monocyte # : x  Auto Eosinophil # : x  Auto Basophil # : x  Auto Neutrophil % : x  Auto Lymphocyte % : x  Auto Monocyte % : x  Auto Eosinophil % : x  Auto Basophil % : x      12-15    134<L>  |  98  |  25<H>  ----------------------------<  185<H>  3.8   |  26  |  1.25    Ca    10.5      15 Dec 2019 07:22    TPro  5.7<L>  /  Alb  3.2<L>  /  TBili  8.2<H>  /  DBili  x   /  AST  27  /  ALT  20  /  AlkPhos  111  12-14      CAPILLARY BLOOD GLUCOSE      POCT Blood Glucose.: 238 mg/dL (15 Dec 2019 12:16)  POCT Blood Glucose.: 191 mg/dL (15 Dec 2019 08:45)  POCT Blood Glucose.: 223 mg/dL (14 Dec 2019 21:52)  POCT Blood Glucose.: 230 mg/dL (14 Dec 2019 17:56)      Vital Signs Last 24 Hrs  T(C): 36.6 (15 Dec 2019 14:07), Max: 36.7 (15 Dec 2019 05:10)  T(F): 97.8 (15 Dec 2019 14:07), Max: 98 (15 Dec 2019 05:10)  HR: 78 (15 Dec 2019 14:07) (78 - 82)  BP: 123/69 (15 Dec 2019 14:07) (108/65 - 125/70)  BP(mean): --  RR: 18 (15 Dec 2019 14:07) (18 - 18)  SpO2: 97% (15 Dec 2019 14:07) (95% - 99%)          PHYSICAL EXAM:    Constitutional: NAD  HEENT: conjunctive   clear   Neck:  No JVD  Respiratory: CTAB  Cardiovascular: S1 and S2  Gastrointestinal: BS+, soft, NT/ND  Extremities: No peripheral edema  Neurological:  no focal deficits  Psychiatric: Normal mood, normal affect                        8.4    4.08  )-----------( 43       ( 15 Dec 2019 11:22 )             25.2       CBC Full  -  ( 15 Dec 2019 11:22 )  WBC Count : 4.08 K/uL  RBC Count : 2.56 M/uL  Hemoglobin : 8.4 g/dL  Hematocrit : 25.2 %  Platelet Count - Automated : 43 K/uL  Mean Cell Volume : 98.4 fl  Mean Cell Hemoglobin : 32.8 pg  Mean Cell Hemoglobin Concentration : 33.3 gm/dL  Auto Neutrophil # : x  Auto Lymphocyte # : x  Auto Monocyte # : x  Auto Eosinophil # : x  Auto Basophil # : x  Auto Neutrophil % : x  Auto Lymphocyte % : x  Auto Monocyte % : x  Auto Eosinophil % : x  Auto Basophil % : x      12-15    134<L>  |  98  |  25<H>  ----------------------------<  185<H>  3.8   |  26  |  1.25    Ca    10.5      15 Dec 2019 07:22    TPro  5.7<L>  /  Alb  3.2<L>  /  TBili  8.2<H>  /  DBili  x   /  AST  27  /  ALT  20  /  AlkPhos  111  12-14      CAPILLARY BLOOD GLUCOSE      POCT Blood Glucose.: 238 mg/dL (15 Dec 2019 12:16)  POCT Blood Glucose.: 191 mg/dL (15 Dec 2019 08:45)  POCT Blood Glucose.: 223 mg/dL (14 Dec 2019 21:52)  POCT Blood Glucose.: 230 mg/dL (14 Dec 2019 17:56)      Vital Signs Last 24 Hrs  T(C): 36.6 (15 Dec 2019 14:07), Max: 36.7 (15 Dec 2019 05:10)  T(F): 97.8 (15 Dec 2019 14:07), Max: 98 (15 Dec 2019 05:10)  HR: 78 (15 Dec 2019 14:07) (78 - 82)  BP: 123/69 (15 Dec 2019 14:07) (108/65 - 125/70)  BP(mean): --  RR: 18 (15 Dec 2019 14:07) (18 - 18)  SpO2: 97% (15 Dec 2019 14:07) (95% - 99%)        Skin: dry   Access: Not applicable

## 2019-12-16 NOTE — PHARMACOTHERAPY INTERVENTION NOTE - COMMENTS
Patient is currently on Lantus 28 units HS and Humlog 8 units pre-meal. BG's yesterday were 191,238,268,195. Blood sugars today are 164, 226 so far. Patient used 6 units of correctional insulin yesterday.  Would recommend to keep Lantus 28 units HS as morning BGs are within goal, but would recommend to increase Humalog to 10 units before meals for better meal-time coverage as BG tends to spike during the day.     Josefa Laurent, PharmD  PGY1 Pharmacy Practice Resident  [phone] Patient is currently on Lantus 28 units HS and Humlog 8 units pre-meal. BG's yesterday were 191,238,268,195. Blood sugars today are 164, 226 so far. Patient used 6 units of correctional insulin yesterday.  Would recommend to keep Lantus 28 units HS as morning BGs are within goal, but would recommend to increase Humalog to 10 units before meals for better meal-time coverage as BG tends to spike during the day. Discuss with NP April.    Josefa Laurent, PharmD  PGY1 Pharmacy Practice Resident  [phone]

## 2019-12-16 NOTE — PROGRESS NOTE ADULT - ASSESSMENT
Impression:  64yo M with Hypertension, DMT2, BPH, obesity, BMI 33, HFpEF grade 1, duodenal ulcer (May, Sep), GAVE s/p APC (Sep), JEAN cirrhosis, who presented from rehab on 11/26 with confusion and MISA creat 2.55, hyponatremia Na 125.   Course: UTI on 12/01. Improved, but continuing confusion. Now listed for transplant.    # JEAN cirrhosis, MELD-Na 12/14 28 (INR 2.23, Na 131, Creat 1.23, bili 8.2)      - varices: none on EGD outside in 10/2019      - HE: present, on high-dose lactulose 30 g q4hrs with 3 soft BM o/n, and Xifaxan      - Ascites: none currently, but history of ascites with SBP      - HCC: none on US 11/27, AFP 23 in 10/19; MRI w/o lesions from 12/6      - Vascular assessment: poor US doppler exam for eval of vessels due to body habitus       - Liver Workup: HAV immune, HBV non-immune, HCV neg, HIV neg, iron studies consistent wiith AoCD, RPR neg, QG neg, CMV neg, EBV IgG pos only, HSV1 pos, ASMA neg, AMA neg, ceruloplasmin LOW 12      - Age appropriate screening: colonoscopy 5-6 months ago per patient      - Dental evaluation: >1 year ago, no loose teeth per patient      - Cardiac evaluation: C completed 12/5, mild-mod non-occlusive CAD with low filling pressures, no interventions needed       - Blood type A+, Ab neg      - Previous transplant workup: none      - Social: retired, previous , lives in [place] with wife and son, previously highly functional (before October), Insurance -Medicare HMO  # MISA, Cr downtrending, likely pre-renal vs HRS; urine sodium still low, likely HRS  # Chest wall hematoma  # T2DM  # Hypertension (currently normotensive  # UTI on ertapenem    Recs:  - daily MELD-Na labs (CMP, INR) while here (INR ordered stat for today)  - please obtain repeat urine culture to assess for clearance  - followup with transplant ID for antibiotic course  - continue lactulose and Xifaxan  - obtain pt's outpatient urology records and biopsy reports  - continue PT  - continue midodrine 20mg TID, continue to hold diuretics  - EGD/colon report in the chart  - ok for discharge from hepatology perspective to rehab

## 2019-12-16 NOTE — PROGRESS NOTE ADULT - SUBJECTIVE AND OBJECTIVE BOX
INTERVAL HPI/OVERNIGHT EVENTS:    pt seen and examined. He denies abdominal pain, n/v.   + frequent bms  alert and oriented today    MEDICATIONS  (STANDING):  artificial  tears Solution 1 Drop(s) Both EYES three times a day  atorvastatin 10 milliGRAM(s) Oral at bedtime  dextrose 5%. 1000 milliLiter(s) (50 mL/Hr) IV Continuous <Continuous>  dextrose 50% Injectable 12.5 Gram(s) IV Push once  dextrose 50% Injectable 25 Gram(s) IV Push once  dextrose 50% Injectable 25 Gram(s) IV Push once  insulin glargine Injectable (LANTUS) 28 Unit(s) SubCutaneous at bedtime  insulin lispro (HumaLOG) corrective regimen sliding scale   SubCutaneous three times a day before meals  insulin lispro (HumaLOG) corrective regimen sliding scale   SubCutaneous at bedtime  insulin lispro Injectable (HumaLOG) 8 Unit(s) SubCutaneous three times a day before meals  lactulose Syrup 30 Gram(s) Oral every 4 hours  midodrine. 20 milliGRAM(s) Oral three times a day  pantoprazole    Tablet 40 milliGRAM(s) Oral before breakfast  rifAXIMin 550 milliGRAM(s) Oral two times a day  sucralfate 1 Gram(s) Oral two times a day  tamsulosin 0.4 milliGRAM(s) Oral at bedtime    MEDICATIONS  (PRN):  dextrose 40% Gel 15 Gram(s) Oral once PRN Blood Glucose LESS THAN 70 milliGRAM(s)/deciliter  glucagon  Injectable 1 milliGRAM(s) IntraMuscular once PRN Glucose LESS THAN 70 milligrams/deciliter      Allergies    codeine (Anaphylaxis)    Intolerances    NO  RED MEAT (Unknown)      Review of Systems:    General:  No wt loss, fevers, chills, night sweats,fatigue,   Eyes:  Good vision, no reported pain  ENT:  No sore throat, pain, runny nose, dysphagia  CV:  No pain, palpitations, hypo/hypertension  Resp:  No dyspnea, cough, tachypnea, wheezing  GI:  No pain, No nausea, No vomiting, No diarrhea, No constipation, No weight loss, No fever, No pruritis, No rectal bleeding, No melena, No dysphagia  :  No pain, bleeding, incontinence, nocturia  Muscle:  No pain, weakness  Neuro:  No weakness, tingling, memory problems  Psych:  No fatigue, insomnia, mood problems, depression  Endocrine:  No polyuria, polydypsia, cold/heat intolerance  Heme:  No petechiae, ecchymosis, easy bruisability  Skin:  No rash, tattoos, scars, edema      Vital Signs Last 24 Hrs  T(C): 36.2 (16 Dec 2019 12:18), Max: 36.8 (16 Dec 2019 04:03)  T(F): 97.2 (16 Dec 2019 12:18), Max: 98.3 (16 Dec 2019 04:03)  HR: 72 (16 Dec 2019 11:50) (69 - 78)  BP: 108/56 (16 Dec 2019 11:50) (100/57 - 123/69)  BP(mean): --  RR: 18 (16 Dec 2019 12:18) (18 - 18)  SpO2: 99% (16 Dec 2019 12:18) (96% - 99%)    PHYSICAL EXAM:    Constitutional: NAD, well-developed  HEENT: EOMI, throat clear  Neck: No LAD, supple  Respiratory: CTA and P  Cardiovascular: S1 and S2, RRR, no M  Gastrointestinal: BS+, soft, NT/ND, neg HSM,  Extremities: No peripheral edema, neg clubing, cyanosis  Vascular: 2+ peripheral pulses  Neurological: A/O x 3, no focal deficits  Psychiatric: Normal mood, normal affect  Skin: No rashes      LABS:                        7.8    4.06  )-----------( 47       ( 16 Dec 2019 08:21 )             23.1     12-16    135  |  99  |  24<H>  ----------------------------<  173<H>  3.8   |  26  |  1.14    Ca    9.9      16 Dec 2019 06:20    TPro  5.6<L>  /  Alb  3.3  /  TBili  7.1<H>  /  DBili  x   /  AST  32  /  ALT  19  /  AlkPhos  144<H>  12-16          RADIOLOGY & ADDITIONAL TESTS:

## 2019-12-16 NOTE — PROGRESS NOTE ADULT - SUBJECTIVE AND OBJECTIVE BOX
[facility] - Sushila Dhaliwal, Doug, Morenita, Ayo, Colleen Williamson  Office Number:  [phone]    Patient resting comfortably in bed in NAD.  Laying flat with no respiratory distress.  No complaints of chest pain, dyspnea, palpitations, PND, or orthopnea.    ROS: negative unless otherwise mentioned.    Telemetry:  off    MEDICATIONS  (STANDING):  artificial  tears Solution 1 Drop(s) Both EYES three times a day  atorvastatin 10 milliGRAM(s) Oral at bedtime  dextrose 5%. 1000 milliLiter(s) (50 mL/Hr) IV Continuous <Continuous>  dextrose 50% Injectable 12.5 Gram(s) IV Push once  dextrose 50% Injectable 25 Gram(s) IV Push once  dextrose 50% Injectable 25 Gram(s) IV Push once  insulin glargine Injectable (LANTUS) 28 Unit(s) SubCutaneous at bedtime  insulin lispro (HumaLOG) corrective regimen sliding scale   SubCutaneous three times a day before meals  insulin lispro (HumaLOG) corrective regimen sliding scale   SubCutaneous at bedtime  insulin lispro Injectable (HumaLOG) 8 Unit(s) SubCutaneous three times a day before meals  lactulose Syrup 30 Gram(s) Oral every 4 hours  midodrine. 20 milliGRAM(s) Oral three times a day  pantoprazole    Tablet 40 milliGRAM(s) Oral before breakfast  rifAXIMin 550 milliGRAM(s) Oral two times a day  sucralfate 1 Gram(s) Oral two times a day  tamsulosin 0.4 milliGRAM(s) Oral at bedtime    MEDICATIONS  (PRN):  dextrose 40% Gel 15 Gram(s) Oral once PRN Blood Glucose LESS THAN 70 milliGRAM(s)/deciliter  glucagon  Injectable 1 milliGRAM(s) IntraMuscular once PRN Glucose LESS THAN 70 milligrams/deciliter      Allergies    codeine (Anaphylaxis)    Intolerances    NO  RED MEAT (Unknown)      Vital Signs Last 24 Hrs  T(C): 36.8 (16 Dec 2019 04:03), Max: 36.8 (16 Dec 2019 04:03)  T(F): 98.3 (16 Dec 2019 04:03), Max: 98.3 (16 Dec 2019 04:03)  HR: 77 (16 Dec 2019 04:03) (69 - 78)  BP: 100/57 (16 Dec 2019 04:03) (100/57 - 123/69)  BP(mean): --  RR: 18 (16 Dec 2019 04:03) (18 - 18)  SpO2: 96% (16 Dec 2019 04:03) (96% - 98%)    I&O's Summary    15 Dec 2019 07:01  -  16 Dec 2019 07:00  --------------------------------------------------------  IN: 900 mL / OUT: 1250 mL / NET: -350 mL        ON EXAM:    General: NAD, awake and alert, oriented x 3, obese  HEENT: Mucous membranes are moist, anicteric  Lungs: Non-labored, breath sounds are clear bilaterally, No wheezing, rales or rhonchi  Cardiovascular: Regular, S1 and S2, no murmurs, rubs, or gallops  Gastrointestinal: Bowel Sounds present, soft, nontender.   Lymph: No peripheral edema. No lymphadenopathy.  Skin: No rashes or ulcers  Psych:  Mood & affect appropriate    LABS: All Labs Reviewed:                        8.4    4.08  )-----------( 43       ( 15 Dec 2019 11:22 )             25.2                         8.0    3.85  )-----------( 40       ( 14 Dec 2019 11:06 )             23.7     16 Dec 2019 06:20    135    |  99     |  24     ----------------------------<  173    3.8     |  26     |  1.14   15 Dec 2019 07:22    134    |  98     |  25     ----------------------------<  185    3.8     |  26     |  1.25   14 Dec 2019 06:18    131    |  95     |  27     ----------------------------<  195    3.6     |  25     |  1.23     Ca    9.9        16 Dec 2019 06:20  Ca    10.5       15 Dec 2019 07:22  Ca    10.5       14 Dec 2019 06:18    TPro  5.6    /  Alb  3.3    /  TBili  7.1    /  DBili  x      /  AST  32     /  ALT  19     /  AlkPhos  144    16 Dec 2019 06:20  TPro  5.7    /  Alb  3.2    /  TBili  8.2    /  DBili  x      /  AST  27     /  ALT  20     /  AlkPhos  111    14 Dec 2019 06:18          Blood Culture:

## 2019-12-17 VITALS
DIASTOLIC BLOOD PRESSURE: 56 MMHG | RESPIRATION RATE: 18 BRPM | HEART RATE: 74 BPM | TEMPERATURE: 98 F | SYSTOLIC BLOOD PRESSURE: 104 MMHG | OXYGEN SATURATION: 100 %

## 2019-12-17 LAB
ALBUMIN SERPL ELPH-MCNC: 3.1 G/DL — LOW (ref 3.3–5)
ALP SERPL-CCNC: 135 U/L — HIGH (ref 40–120)
ALT FLD-CCNC: 18 U/L — SIGNIFICANT CHANGE UP (ref 10–45)
ANION GAP SERPL CALC-SCNC: 9 MMOL/L — SIGNIFICANT CHANGE UP (ref 5–17)
APTT BLD: 48 SEC — HIGH (ref 27.5–36.3)
AST SERPL-CCNC: 34 U/L — SIGNIFICANT CHANGE UP (ref 10–40)
BILIRUB SERPL-MCNC: 7 MG/DL — HIGH (ref 0.2–1.2)
BUN SERPL-MCNC: 22 MG/DL — SIGNIFICANT CHANGE UP (ref 7–23)
CALCIUM SERPL-MCNC: 9.8 MG/DL — SIGNIFICANT CHANGE UP (ref 8.4–10.5)
CHLORIDE SERPL-SCNC: 98 MMOL/L — SIGNIFICANT CHANGE UP (ref 96–108)
CO2 SERPL-SCNC: 26 MMOL/L — SIGNIFICANT CHANGE UP (ref 22–31)
CREAT SERPL-MCNC: 1.09 MG/DL — SIGNIFICANT CHANGE UP (ref 0.5–1.3)
GLUCOSE BLDC GLUCOMTR-MCNC: 100 MG/DL — HIGH (ref 70–99)
GLUCOSE BLDC GLUCOMTR-MCNC: 112 MG/DL — HIGH (ref 70–99)
GLUCOSE BLDC GLUCOMTR-MCNC: 232 MG/DL — HIGH (ref 70–99)
GLUCOSE SERPL-MCNC: 94 MG/DL — SIGNIFICANT CHANGE UP (ref 70–99)
HCT VFR BLD CALC: 21.6 % — LOW (ref 39–50)
HGB BLD-MCNC: 7.5 G/DL — LOW (ref 13–17)
INR BLD: 2.06 RATIO — HIGH (ref 0.88–1.16)
MCHC RBC-ENTMCNC: 34.1 PG — HIGH (ref 27–34)
MCHC RBC-ENTMCNC: 34.7 GM/DL — SIGNIFICANT CHANGE UP (ref 32–36)
MCV RBC AUTO: 98.2 FL — SIGNIFICANT CHANGE UP (ref 80–100)
MEV IGG SER-ACNC: >300 AU/ML — SIGNIFICANT CHANGE UP
MEV IGG+IGM SER-IMP: POSITIVE — SIGNIFICANT CHANGE UP
MUV AB SER-ACNC: POSITIVE — SIGNIFICANT CHANGE UP
MUV IGG FLD-ACNC: 236 AU/ML — SIGNIFICANT CHANGE UP
PLATELET # BLD AUTO: 53 K/UL — LOW (ref 150–400)
POTASSIUM SERPL-MCNC: 3.6 MMOL/L — SIGNIFICANT CHANGE UP (ref 3.5–5.3)
POTASSIUM SERPL-SCNC: 3.6 MMOL/L — SIGNIFICANT CHANGE UP (ref 3.5–5.3)
PROT SERPL-MCNC: 5.5 G/DL — LOW (ref 6–8.3)
PROTHROM AB SERPL-ACNC: 23.8 SEC — HIGH (ref 10–13.1)
RBC # BLD: 2.2 M/UL — LOW (ref 4.2–5.8)
RBC # FLD: 18.4 % — HIGH (ref 10.3–14.5)
SODIUM SERPL-SCNC: 133 MMOL/L — LOW (ref 135–145)
WBC # BLD: 3.77 K/UL — LOW (ref 3.8–10.5)
WBC # FLD AUTO: 3.77 K/UL — LOW (ref 3.8–10.5)

## 2019-12-17 PROCEDURE — 84105 ASSAY OF URINE PHOSPHORUS: CPT

## 2019-12-17 PROCEDURE — 82140 ASSAY OF AMMONIA: CPT

## 2019-12-17 PROCEDURE — 82043 UR ALBUMIN QUANTITATIVE: CPT

## 2019-12-17 PROCEDURE — 87205 SMEAR GRAM STAIN: CPT

## 2019-12-17 PROCEDURE — 80074 ACUTE HEPATITIS PANEL: CPT

## 2019-12-17 PROCEDURE — 90686 IIV4 VACC NO PRSV 0.5 ML IM: CPT

## 2019-12-17 PROCEDURE — 82525 ASSAY OF COPPER: CPT

## 2019-12-17 PROCEDURE — 86334 IMMUNOFIX E-PHORESIS SERUM: CPT

## 2019-12-17 PROCEDURE — 86901 BLOOD TYPING SEROLOGIC RH(D): CPT

## 2019-12-17 PROCEDURE — 99285 EMERGENCY DEPT VISIT HI MDM: CPT

## 2019-12-17 PROCEDURE — 90670 PCV13 VACCINE IM: CPT

## 2019-12-17 PROCEDURE — 82330 ASSAY OF CALCIUM: CPT

## 2019-12-17 PROCEDURE — 86160 COMPLEMENT ANTIGEN: CPT

## 2019-12-17 PROCEDURE — 86644 CMV ANTIBODY: CPT

## 2019-12-17 PROCEDURE — 85610 PROTHROMBIN TIME: CPT

## 2019-12-17 PROCEDURE — 82306 VITAMIN D 25 HYDROXY: CPT

## 2019-12-17 PROCEDURE — 83010 ASSAY OF HAPTOGLOBIN QUANT: CPT

## 2019-12-17 PROCEDURE — 84540 ASSAY OF URINE/UREA-N: CPT

## 2019-12-17 PROCEDURE — A9585: CPT

## 2019-12-17 PROCEDURE — 86923 COMPATIBILITY TEST ELECTRIC: CPT

## 2019-12-17 PROCEDURE — 80053 COMPREHEN METABOLIC PANEL: CPT

## 2019-12-17 PROCEDURE — 83090 ASSAY OF HOMOCYSTEINE: CPT

## 2019-12-17 PROCEDURE — 86765 RUBEOLA ANTIBODY: CPT

## 2019-12-17 PROCEDURE — C1894: CPT

## 2019-12-17 PROCEDURE — 83521 IG LIGHT CHAINS FREE EACH: CPT

## 2019-12-17 PROCEDURE — 87186 SC STD MICRODIL/AGAR DIL: CPT

## 2019-12-17 PROCEDURE — 86146 BETA-2 GLYCOPROTEIN ANTIBODY: CPT

## 2019-12-17 PROCEDURE — 93460 R&L HRT ART/VENTRICLE ANGIO: CPT

## 2019-12-17 PROCEDURE — 85307 ASSAY ACTIVATED PROTEIN C: CPT

## 2019-12-17 PROCEDURE — 86777 TOXOPLASMA ANTIBODY: CPT

## 2019-12-17 PROCEDURE — 86900 BLOOD TYPING SEROLOGIC ABO: CPT

## 2019-12-17 PROCEDURE — 81240 F2 GENE: CPT

## 2019-12-17 PROCEDURE — 83615 LACTATE (LD) (LDH) ENZYME: CPT

## 2019-12-17 PROCEDURE — C1887: CPT

## 2019-12-17 PROCEDURE — 82105 ALPHA-FETOPROTEIN SERUM: CPT

## 2019-12-17 PROCEDURE — 86665 EPSTEIN-BARR CAPSID VCA: CPT

## 2019-12-17 PROCEDURE — 81001 URINALYSIS AUTO W/SCOPE: CPT

## 2019-12-17 PROCEDURE — 87340 HEPATITIS B SURFACE AG IA: CPT

## 2019-12-17 PROCEDURE — 85301 ANTITHROMBIN III ANTIGEN: CPT

## 2019-12-17 PROCEDURE — 86038 ANTINUCLEAR ANTIBODIES: CPT

## 2019-12-17 PROCEDURE — 85730 THROMBOPLASTIN TIME PARTIAL: CPT

## 2019-12-17 PROCEDURE — 86880 COOMBS TEST DIRECT: CPT

## 2019-12-17 PROCEDURE — 86696 HERPES SIMPLEX TYPE 2 TEST: CPT

## 2019-12-17 PROCEDURE — 84166 PROTEIN E-PHORESIS/URINE/CSF: CPT

## 2019-12-17 PROCEDURE — 97110 THERAPEUTIC EXERCISES: CPT

## 2019-12-17 PROCEDURE — 74176 CT ABD & PELVIS W/O CONTRAST: CPT

## 2019-12-17 PROCEDURE — 85300 ANTITHROMBIN III ACTIVITY: CPT

## 2019-12-17 PROCEDURE — 86704 HEP B CORE ANTIBODY TOTAL: CPT

## 2019-12-17 PROCEDURE — 97116 GAIT TRAINING THERAPY: CPT

## 2019-12-17 PROCEDURE — 87086 URINE CULTURE/COLONY COUNT: CPT

## 2019-12-17 PROCEDURE — 86709 HEPATITIS A IGM ANTIBODY: CPT

## 2019-12-17 PROCEDURE — 80061 LIPID PANEL: CPT

## 2019-12-17 PROCEDURE — 83945 ASSAY OF OXALATE: CPT

## 2019-12-17 PROCEDURE — 84560 ASSAY OF URINE/URIC ACID: CPT

## 2019-12-17 PROCEDURE — 93005 ELECTROCARDIOGRAM TRACING: CPT

## 2019-12-17 PROCEDURE — 84100 ASSAY OF PHOSPHORUS: CPT

## 2019-12-17 PROCEDURE — 87521 HEPATITIS C PROBE&RVRS TRNSC: CPT

## 2019-12-17 PROCEDURE — 85613 RUSSELL VIPER VENOM DILUTED: CPT

## 2019-12-17 PROCEDURE — 86850 RBC ANTIBODY SCREEN: CPT

## 2019-12-17 PROCEDURE — 76700 US EXAM ABDOM COMPLETE: CPT

## 2019-12-17 PROCEDURE — C1760: CPT

## 2019-12-17 PROCEDURE — 86787 VARICELLA-ZOSTER ANTIBODY: CPT

## 2019-12-17 PROCEDURE — 86803 HEPATITIS C AB TEST: CPT

## 2019-12-17 PROCEDURE — 84300 ASSAY OF URINE SODIUM: CPT

## 2019-12-17 PROCEDURE — 86706 HEP B SURFACE ANTIBODY: CPT

## 2019-12-17 PROCEDURE — 82746 ASSAY OF FOLIC ACID SERUM: CPT

## 2019-12-17 PROCEDURE — 82533 TOTAL CORTISOL: CPT

## 2019-12-17 PROCEDURE — 83970 ASSAY OF PARATHORMONE: CPT

## 2019-12-17 PROCEDURE — 84156 ASSAY OF PROTEIN URINE: CPT

## 2019-12-17 PROCEDURE — 71045 X-RAY EXAM CHEST 1 VIEW: CPT

## 2019-12-17 PROCEDURE — 87040 BLOOD CULTURE FOR BACTERIA: CPT

## 2019-12-17 PROCEDURE — C1769: CPT

## 2019-12-17 PROCEDURE — 86780 TREPONEMA PALLIDUM: CPT

## 2019-12-17 PROCEDURE — 86708 HEPATITIS A ANTIBODY: CPT

## 2019-12-17 PROCEDURE — C8929: CPT

## 2019-12-17 PROCEDURE — G0103: CPT

## 2019-12-17 PROCEDURE — 82947 ASSAY GLUCOSE BLOOD QUANT: CPT

## 2019-12-17 PROCEDURE — 85306 CLOT INHIBIT PROT S FREE: CPT

## 2019-12-17 PROCEDURE — 84155 ASSAY OF PROTEIN SERUM: CPT

## 2019-12-17 PROCEDURE — 84295 ASSAY OF SERUM SODIUM: CPT

## 2019-12-17 PROCEDURE — 85045 AUTOMATED RETICULOCYTE COUNT: CPT

## 2019-12-17 PROCEDURE — 82435 ASSAY OF BLOOD CHLORIDE: CPT

## 2019-12-17 PROCEDURE — 81241 F5 GENE: CPT

## 2019-12-17 PROCEDURE — 82784 ASSAY IGA/IGD/IGG/IGM EACH: CPT

## 2019-12-17 PROCEDURE — 97161 PT EVAL LOW COMPLEX 20 MIN: CPT

## 2019-12-17 PROCEDURE — 97530 THERAPEUTIC ACTIVITIES: CPT

## 2019-12-17 PROCEDURE — 82652 VIT D 1 25-DIHYDROXY: CPT

## 2019-12-17 PROCEDURE — 73660 X-RAY EXAM OF TOE(S): CPT

## 2019-12-17 PROCEDURE — 86147 CARDIOLIPIN ANTIBODY EA IG: CPT

## 2019-12-17 PROCEDURE — 82248 BILIRUBIN DIRECT: CPT

## 2019-12-17 PROCEDURE — 82728 ASSAY OF FERRITIN: CPT

## 2019-12-17 PROCEDURE — 85014 HEMATOCRIT: CPT

## 2019-12-17 PROCEDURE — 72125 CT NECK SPINE W/O DYE: CPT

## 2019-12-17 PROCEDURE — 85302 CLOT INHIBIT PROT C ANTIGEN: CPT

## 2019-12-17 PROCEDURE — 81291 MTHFR GENE: CPT

## 2019-12-17 PROCEDURE — 83690 ASSAY OF LIPASE: CPT

## 2019-12-17 PROCEDURE — P9047: CPT

## 2019-12-17 PROCEDURE — 85303 CLOT INHIBIT PROT C ACTIVITY: CPT

## 2019-12-17 PROCEDURE — 70450 CT HEAD/BRAIN W/O DYE: CPT

## 2019-12-17 PROCEDURE — 86480 TB TEST CELL IMMUN MEASURE: CPT

## 2019-12-17 PROCEDURE — 71250 CT THORAX DX C-: CPT

## 2019-12-17 PROCEDURE — 84133 ASSAY OF URINE POTASSIUM: CPT

## 2019-12-17 PROCEDURE — 82310 ASSAY OF CALCIUM: CPT

## 2019-12-17 PROCEDURE — 84165 PROTEIN E-PHORESIS SERUM: CPT

## 2019-12-17 PROCEDURE — 82607 VITAMIN B-12: CPT

## 2019-12-17 PROCEDURE — 82962 GLUCOSE BLOOD TEST: CPT

## 2019-12-17 PROCEDURE — 83070 ASSAY OF HEMOSIDERIN QUAL: CPT

## 2019-12-17 PROCEDURE — 83935 ASSAY OF URINE OSMOLALITY: CPT

## 2019-12-17 PROCEDURE — 82570 ASSAY OF URINE CREATININE: CPT

## 2019-12-17 PROCEDURE — 83930 ASSAY OF BLOOD OSMOLALITY: CPT

## 2019-12-17 PROCEDURE — 86381 MITOCHONDRIAL ANTIBODY EACH: CPT

## 2019-12-17 PROCEDURE — 36430 TRANSFUSION BLD/BLD COMPNT: CPT

## 2019-12-17 PROCEDURE — 99232 SBSQ HOSP IP/OBS MODERATE 35: CPT

## 2019-12-17 PROCEDURE — 86664 EPSTEIN-BARR NUCLEAR ANTIGEN: CPT

## 2019-12-17 PROCEDURE — 86762 RUBELLA ANTIBODY: CPT

## 2019-12-17 PROCEDURE — 74183 MRI ABD W/O CNTR FLWD CNTR: CPT

## 2019-12-17 PROCEDURE — 82803 BLOOD GASES ANY COMBINATION: CPT

## 2019-12-17 PROCEDURE — 36415 COLL VENOUS BLD VENIPUNCTURE: CPT

## 2019-12-17 PROCEDURE — 85027 COMPLETE CBC AUTOMATED: CPT

## 2019-12-17 PROCEDURE — 84550 ASSAY OF BLOOD/URIC ACID: CPT

## 2019-12-17 PROCEDURE — 83036 HEMOGLOBIN GLYCOSYLATED A1C: CPT

## 2019-12-17 PROCEDURE — 86663 EPSTEIN-BARR ANTIBODY: CPT

## 2019-12-17 PROCEDURE — 80048 BASIC METABOLIC PNL TOTAL CA: CPT

## 2019-12-17 PROCEDURE — 86695 HERPES SIMPLEX TYPE 1 TEST: CPT

## 2019-12-17 PROCEDURE — 86635 COCCIDIOIDES ANTIBODY: CPT

## 2019-12-17 PROCEDURE — P9011: CPT

## 2019-12-17 PROCEDURE — 82378 CARCINOEMBRYONIC ANTIGEN: CPT

## 2019-12-17 PROCEDURE — 84443 ASSAY THYROID STIM HORMONE: CPT

## 2019-12-17 PROCEDURE — 82390 ASSAY OF CERULOPLASMIN: CPT

## 2019-12-17 PROCEDURE — 86255 FLUORESCENT ANTIBODY SCREEN: CPT

## 2019-12-17 PROCEDURE — 84132 ASSAY OF SERUM POTASSIUM: CPT

## 2019-12-17 PROCEDURE — 86735 MUMPS ANTIBODY: CPT

## 2019-12-17 PROCEDURE — 83605 ASSAY OF LACTIC ACID: CPT

## 2019-12-17 PROCEDURE — 83735 ASSAY OF MAGNESIUM: CPT

## 2019-12-17 PROCEDURE — 99232 SBSQ HOSP IP/OBS MODERATE 35: CPT | Mod: GC

## 2019-12-17 RX ORDER — SPIRONOLACTONE 25 MG/1
1 TABLET, FILM COATED ORAL
Qty: 0 | Refills: 0 | DISCHARGE

## 2019-12-17 RX ORDER — POTASSIUM CHLORIDE 20 MEQ
1 PACKET (EA) ORAL
Qty: 0 | Refills: 0 | DISCHARGE

## 2019-12-17 RX ORDER — PHYTONADIONE (VIT K1) 5 MG
1 TABLET ORAL
Qty: 0 | Refills: 0 | DISCHARGE

## 2019-12-17 RX ORDER — INSULIN LISPRO 100/ML
8 VIAL (ML) SUBCUTANEOUS
Qty: 0 | Refills: 0 | DISCHARGE
Start: 2019-12-17

## 2019-12-17 RX ORDER — METOCLOPRAMIDE HCL 10 MG
1 TABLET ORAL
Qty: 0 | Refills: 0 | DISCHARGE

## 2019-12-17 RX ORDER — LACTULOSE 10 G/15ML
45 SOLUTION ORAL
Qty: 0 | Refills: 0 | DISCHARGE
Start: 2019-12-17

## 2019-12-17 RX ORDER — INSULIN GLARGINE 100 [IU]/ML
40 INJECTION, SOLUTION SUBCUTANEOUS
Qty: 0 | Refills: 0 | DISCHARGE

## 2019-12-17 RX ORDER — FUROSEMIDE 40 MG
1 TABLET ORAL
Qty: 0 | Refills: 0 | DISCHARGE

## 2019-12-17 RX ORDER — INSULIN GLARGINE 100 [IU]/ML
28 INJECTION, SOLUTION SUBCUTANEOUS
Qty: 0 | Refills: 0 | DISCHARGE
Start: 2019-12-17

## 2019-12-17 RX ORDER — LACTULOSE 10 G/15ML
45 SOLUTION ORAL
Qty: 5500 | Refills: 0

## 2019-12-17 RX ORDER — MIDODRINE HYDROCHLORIDE 2.5 MG/1
2 TABLET ORAL
Qty: 0 | Refills: 0 | DISCHARGE
Start: 2019-12-17

## 2019-12-17 RX ORDER — INSULIN LISPRO 100/ML
6 VIAL (ML) SUBCUTANEOUS ONCE
Refills: 0 | Status: COMPLETED | OUTPATIENT
Start: 2019-12-17 | End: 2019-12-17

## 2019-12-17 RX ORDER — CHOLECALCIFEROL (VITAMIN D3) 125 MCG
1 CAPSULE ORAL
Qty: 0 | Refills: 0 | DISCHARGE

## 2019-12-17 RX ORDER — URSODIOL 250 MG/1
1 TABLET, FILM COATED ORAL
Qty: 0 | Refills: 0 | DISCHARGE

## 2019-12-17 RX ADMIN — INFLUENZA VIRUS VACCINE 0.5 MILLILITER(S): 15; 15; 15; 15 SUSPENSION INTRAMUSCULAR at 10:15

## 2019-12-17 RX ADMIN — MIDODRINE HYDROCHLORIDE 20 MILLIGRAM(S): 2.5 TABLET ORAL at 05:12

## 2019-12-17 RX ADMIN — PANTOPRAZOLE SODIUM 40 MILLIGRAM(S): 20 TABLET, DELAYED RELEASE ORAL at 05:13

## 2019-12-17 RX ADMIN — Medication 6 UNIT(S): at 09:28

## 2019-12-17 RX ADMIN — MIDODRINE HYDROCHLORIDE 20 MILLIGRAM(S): 2.5 TABLET ORAL at 13:16

## 2019-12-17 RX ADMIN — Medication 10 UNIT(S): at 13:14

## 2019-12-17 RX ADMIN — Medication 1 GRAM(S): at 05:13

## 2019-12-17 RX ADMIN — Medication 1 DROP(S): at 05:11

## 2019-12-17 RX ADMIN — LACTULOSE 30 GRAM(S): 10 SOLUTION ORAL at 01:46

## 2019-12-17 RX ADMIN — Medication 2: at 13:14

## 2019-12-17 NOTE — PROGRESS NOTE ADULT - SUBJECTIVE AND OBJECTIVE BOX
Follow Up:  UTI, Pre-LT Evaluation    Interval History: no chills or fevers. no n/v/d. no abdominal pain. no urinary symptoms    REVIEW OF SYSTEMS  [  ] ROS unobtainable because:    [ x ] All other systems negative except as noted below    Constitutional:  [ ] fever [ ] chills  [ ] weight loss  [ ] weakness  Skin:  [ ] rash [ ] phlebitis	  Eyes: [ ] icterus [ ] pain  [ ] discharge	  ENMT: [ ] sore throat  [ ] thrush [ ] ulcers [ ] exudates  Respiratory: [ ] dyspnea [ ] hemoptysis [ ] cough [ ] sputum	  Cardiovascular:  [ ] chest pain [ ] palpitations [ ] edema	  Gastrointestinal:  [ ] nausea [ ] vomiting [ ] diarrhea [ ] constipation [ ] pain	  Genitourinary:  [ ] dysuria [ ] frequency [ ] hematuria [ ] discharge [ ] flank pain  [ ] incontinence  Musculoskeletal:  [ ] myalgias [ ] arthralgias [ ] arthritis  [ ] back pain  Neurological:  [ ] headache [ ] seizures  [ ] confusion/altered mental status    Allergies  codeine (Anaphylaxis)  Tomatoes (Unknown)        ANTIMICROBIALS:  rifAXIMin 550 two times a day      OTHER MEDS:  MEDICATIONS  (STANDING):  atorvastatin 10 at bedtime  dextrose 40% Gel 15 once PRN  dextrose 50% Injectable 12.5 once  dextrose 50% Injectable 25 once  dextrose 50% Injectable 25 once  glucagon  Injectable 1 once PRN  insulin glargine Injectable (LANTUS) 28 at bedtime  insulin lispro (HumaLOG) corrective regimen sliding scale  three times a day before meals  insulin lispro (HumaLOG) corrective regimen sliding scale  at bedtime  insulin lispro Injectable (HumaLOG) 10 three times a day before meals  lactulose Syrup 30 every 4 hours  midodrine. 20 three times a day  pantoprazole    Tablet 40 before breakfast  sucralfate 1 two times a day  tamsulosin 0.4 at bedtime      Vital Signs Last 24 Hrs  T(C): 36.7 (17 Dec 2019 10:00), Max: 36.9 (17 Dec 2019 04:29)  T(F): 98 (17 Dec 2019 10:00), Max: 98.4 (17 Dec 2019 04:29)  HR: 74 (17 Dec 2019 10:00) (74 - 81)  BP: 104/56 (17 Dec 2019 10:00) (100/56 - 112/53)  BP(mean): --  RR: 18 (17 Dec 2019 10:00) (17 - 19)  SpO2: 100% (17 Dec 2019 10:00) (97% - 100%)    PHYSICAL EXAMINATION:  General: Alert and Awake, NAD, Jandice  HEENT: PERRL, EOMI, Scleral Icterus  Neck: Supple  Cardiac: RRR, No M/R/G  Resp: CTAB, No Wh/Rh/Ra  Abdomen: NBS, NT/ND, No HSM, No rigidity or guarding  MSK: No LE edema. No Calf tenderness  : No bob  Skin: No rashes or lesions. Skin is warm and dry to the touch.   Neuro: Alert and Awake. CN 2-12 Grossly intact. Moves all four extremities spontaneously.  Psych: Calm, Pleasant, Cooperative                            7.5    3.77  )-----------( 53       ( 17 Dec 2019 09:12 )             21.6       12-17    133<L>  |  98  |  22  ----------------------------<  94  3.6   |  26  |  1.09    Ca    9.8      17 Dec 2019 06:44    TPro  5.5<L>  /  Alb  3.1<L>  /  TBili  7.0<H>  /  DBili  x   /  AST  34  /  ALT  18  /  AlkPhos  135<H>  12-17      TRANSPLANT CLEARANCE LABWORK    Treponema Pallidum Antibody Interpretation: Negative (12-04-19 @ 08:33)  Treponema Pallidum Antibody Interpretation: Negative (12-03-19 @ 09:01)    Quantiferon TB Plus: NEGATIVE (12-04-19 @ 08:38)  Quantiferon TB Plus Nil: 0.16 IU/mL (12-04-19 @ 08:38)  Quantiferon TB Plus TB1 minus Nil: 0.00 IU/mL (12-04-19 @ 08:38)  Quantiferon TB Plus TB2 minus Nil: 0.01 IU/mL (12-04-19 @ 08:38)  Quantiferon TB Plus: NEGATIVE (12-03-19 @ 08:51)  Quantiferon TB Plus Nil: 0.03 IU/mL (12-03-19 @ 08:51)  Quantiferon TB Plus TB1 minus Nil: 0.00 IU/mL (12-03-19 @ 08:51)  Quantiferon TB Plus TB2 minus Nil: 0.00 IU/mL (12-03-19 @ 08:51)     EBV VCA IgM EIA: <10.0 U/mL (12-04-19 @ 08:33)  EBV VCA IgG EIA: >750.0 U/mL (12-04-19 @ 08:33)  EBV EA Ab EIA: <5.0 U/mL (12-04-19 @ 08:33)     CMV IgG Antibody: <0.20 U/mL (12-04-19 @ 08:33)  CMV IgG Interpretation: Negative (12-04-19 @ 08:33)     Herpes simplex 1 IgG Ab Result: 37.20 Index (12-04-19 @ 08:33)  Herpes simplex 1 IgG Ab Interp: Positive (12-04-19 @ 08:33)  Herpes simplex 2 IgG Ab Result: 0.20 Index (12-04-19 @ 08:33)  Herpes simplex 2 IgG Ab Interp: Negative (12-04-19 @ 08:33)     Varicella IgG Antibody: 2083.0 Index (12-04-19 @ 08:33)  Varicella IgG Interpretation: Positive (12-04-19 @ 08:33)     Hepatitis A IgG Ab Result: Reactive (12-04-19 @ 08:33)     Hepatitis B Core Antibody, Total: Nonreact (12-04-19 @ 08:33)  Hepatitis B Surface Antibody: Nonreact (12-04-19 @ 08:33)  Hepatitis B Surface Antigen: Nonreact (12-04-19 @ 08:33)  Hepatitis B Surface AB Quantitative: <3.0 mIU/mL (12-04-19 @ 08:33)  Hepatitis B Surface Antigen: Nonreact (12-03-19 @ 09:01)  Hepatitis B Core IgM Antibody: Nonreact (12-03-19 @ 09:01)  Hepatitis B Surface Antigen: Nonreact (11-27-19 @ 00:11)  Hepatitis B Core IgM Antibody: Nonreact (11-27-19 @ 00:11)  Hepatitis B Core IgM Antibody: Nonreact (10-23-19 @ 17:56)  Hepatitis B Surface Antigen: Nonreact (10-23-19 @ 17:56)     Hepatitis C RNA, Qualitative: NotDetec (12-04-19 @ 08:38)  Hepatitis C RNA Qualitative Interp: HCV RNA Qualitative assay by RT-PCR using [employer] m2000    The limit of detection: 12 IU/mL.    Result Interpretation:  Not Detected:  HCV RNA is not detected; possibly indicates resolved past  infection or a false reactive antibody result.  Detected:        HCV RNA is detected; indicates hepatitis C virus  infection.  CDC guidelines and [facility] require confirmation of reactive  hepatitis C antibody screening tests. Results should be viewed in the  context of clinical history and other labtests. An indeterminate result  indicates that HCV RNA detection cannot be resolved, please consider  retesting with a new sample. The performance characteristics of this  assay have been determined by [facility] [employer]. The U.S. Food and  Drug Administration (FDA) has not approved or cleared this test, however,  FDA clearance or approval is not currently required for clinical use. (12-04-19 @ 08:38)  Hepatitis C Virus S/CO Ratio: 0.14 S/CO (12-04-19 @ 08:33)  Hepatitis C Virus Interpretation: Nonreact (12-04-19 @ 08:33)  Hepatitis C Virus S/CO Ratio: 0.12 S/CO (12-03-19 @ 09:01)  Hepatitis C Virus Interpretation: Nonreact (12-03-19 @ 09:01)  Hepatitis C Virus S/CO Ratio: 0.16 S/CO (11-27-19 @ 00:11)  Hepatitis C Virus Interpretation: Nonreact (11-27-19 @ 00:11)  Hepatitis C Virus S/CO Ratio: 0.15 S/CO (10-23-19 @ 17:56)  Hepatitis C Virus Interpretation: Nonreact (10-23-19 @ 17:56)  Hepatitis C Virus S/CO Ratio: 0.14 S/CO (04-03-19 @ 16:27)  Hepatitis C Virus Interpretation: Nonreact (04-03-19 @ 16:27)    Mumps Virus IgG Antibody.: 236.0 AU/mL (12-17-19 @ 10:27)  Mumps Antibody Interpretation: Positive (12-17-19 @ 10:27)    Measles IgG Interpretation: Positive (12-17-19 @ 10:27)    Rubella Virus IgG Antibody.: 30.0 Index (12-04-19 @ 08:33)  Rubella IgG Interp: Positive (12-04-19 @ 08:33)    Toxoplasma IgG Screen: <3.0 IU/mL (12-04-19 @ 08:33)  Toxoplasma IgG Interpretation: Negative (12-04-19 @ 08:33)    MICROBIOLOGY:  v  .Urine Clean Catch (Midstream)  12-10-19   >100,000 CFU/ml Escherichia coli ESBL  >100,000 CFU/ml Klebsiella pneumoniae  --  Escherichia coli ESBL  Klebsiella pneumoniae      .Urine Clean Catch (Midstream)  12-06-19   >100,000 CFU/ml Escherichia coli ESBL  >100,000 CFU/ml Klebsiella pneumoniae  --  Gram Negative Rods  Gram Negative Rods      .Blood Blood-Peripheral  11-27-19   No growth at 5 days.  --  --      .Urine Clean Catch (Midstream)  11-26-19   <10,000 CFU/mL Normal Urogenital Shantel  --  --    CMV IgG Antibody: <0.20 U/mL (12-04-19 @ 08:33)  Toxoplasma IgG Screen: <3.0 IU/mL (12-04-19 @ 08:33)    RADIOLOGY:    EXAM:  MR ABDOMEN WAW IC                        PROCEDURE DATE:  12/06/2019    Cirrhosis with evidence of portal hypertension.  No focal hepatic lesion.  Standard hepatic arterial anatomy with widely patent celiac axis and SMA.   SMV and portal veins are patent as above.

## 2019-12-17 NOTE — PROGRESS NOTE ADULT - ASSESSMENT
63M w/ pmh HTN, HLD, DM, non-alcoholic cirrhosis, chronic thrombocytopenia -- sent from rehab for worsening jaundice, dizzy/weakness/falls x 4 days.  h/o strep bacteremia 10/19 s/p RX  12/6 urine Cx with E coli and Klebsiella-ESBL  repeat Cx with same isolates   No bob  No other systemic symptoms though ? confused/encephalopathic  ? colonization/contamination vs UTI  s/p 5 days of Ertapenem for UTI    Overall, E coli UTI, ESBL Colonization, Pre-LT Evaluation, Encounter to Vaccinate Patient    #Pre-LT Evaluation  --No absolute ID contraindications for liver transplant  --For danilo-operative antibiotics will require Meropenem and Vancomycin x1  --Follow up on Measles, Mumps IgG   --Follow up on Coccidiodes Antibody (resided in [facility] for ~3 months)  --Patient was advised to follow up with me in the Infectious Diseases Office in 4 weeks time. Patient was provided with my office contact number of [phone]    #ESBL E coli UTI - s/p 5 days of Ertapenem  --Continue to monitor off of antibiotics    #Encounter to Vaccinate Patient  --s/p Prevnar 12/16 and Influenza vaccine 12/17  --Will require PPSV23 in 8 weeks time  --Will require Hepatitis B vaccination (will address after discharge)    I will sign off at this time. Please feel free to contact me with any further questions or concerns.    Eusebio Pérez M.D.  [facility] Division of Infectious Disease  8AM-5PM: Pager Number [phone]  After Hours (or if no response): Please contact the Infectious Diseases Office at [phone]

## 2019-12-17 NOTE — PROGRESS NOTE ADULT - ASSESSMENT
63 year old male with HTN, DM2, non-alcoholic cirrhosis, who presents with altered mental status. We last saw him during a hospitalization in 10/2019, during which he was significantly volume overloaded and required iv diuresis.  He was initially confused though his mental status has improved. He remains with hyponatremia and high ammonia level.  He is currently being evaluated for liver transplant    Abnormal EKG  - EKG was notable for prolonged qtc. Now 463 on 12/9  - avoid qtc prolonging medications  - replete K to greater than 4  - check daily mg as well and replete to greater than 2     Diastolic HF   - no sign of acute ischemia  - no significant volume overload on exam. CT without pleural effusions or edema, though notable for possible chest wall hematoma.  - can hold diuretics.   - GI and renal follow up.  - TTE normal LV systolic function EF 65%; LVH, DD Grade I  - cath with normal filling pressures and no significant cad. continue with statin.    HTN  - BP on softer side, though has been better overall  - hold diuretics and anti-hypertensives  - midodrine as needed    - All other workup as per primary team  - Will follow with you. Further cardiac w/u as indicated by clinical course   - d/c planning per primary team

## 2019-12-17 NOTE — PROGRESS NOTE ADULT - SUBJECTIVE AND OBJECTIVE BOX
Chief Complaint:  Patient is a 64y old  Male who presents with a chief complaint of difficulty to ambulate/ weakness (17 Dec 2019 09:01)      Interval Events: No adverse events overnight. Feels good, wants to go to rehab.     Allergies:  codeine (Anaphylaxis)  NO  RED MEAT (Unknown)  Tomatoes (Unknown)      Hospital Medications:  artificial  tears Solution 1 Drop(s) Both EYES three times a day  atorvastatin 10 milliGRAM(s) Oral at bedtime  dextrose 40% Gel 15 Gram(s) Oral once PRN  dextrose 5%. 1000 milliLiter(s) IV Continuous <Continuous>  dextrose 50% Injectable 12.5 Gram(s) IV Push once  dextrose 50% Injectable 25 Gram(s) IV Push once  dextrose 50% Injectable 25 Gram(s) IV Push once  glucagon  Injectable 1 milliGRAM(s) IntraMuscular once PRN  insulin glargine Injectable (LANTUS) 28 Unit(s) SubCutaneous at bedtime  insulin lispro (HumaLOG) corrective regimen sliding scale   SubCutaneous three times a day before meals  insulin lispro (HumaLOG) corrective regimen sliding scale   SubCutaneous at bedtime  insulin lispro Injectable (HumaLOG) 10 Unit(s) SubCutaneous three times a day before meals  lactulose Syrup 30 Gram(s) Oral every 4 hours  midodrine. 20 milliGRAM(s) Oral three times a day  pantoprazole    Tablet 40 milliGRAM(s) Oral before breakfast  rifAXIMin 550 milliGRAM(s) Oral two times a day  sucralfate 1 Gram(s) Oral two times a day  tamsulosin 0.4 milliGRAM(s) Oral at bedtime      PMHX/PSHX:  GIB (gastrointestinal bleeding)  GERD with esophagitis  Hepatic encephalopathy  Obesity  Fatty liver disease, nonalcoholic  Renal stones  Hypertension  Neuropathy  Hypercholesteremia  Diabetes  S/P cholecystectomy  No significant past surgical history      Family history:  Family history of type 2 diabetes mellitus  Family history of hypertension  Family history of stomach cancer  No pertinent family history in first degree relatives      ROS:     General:  No wt loss, fevers, chills, night sweats, fatigue,   Eyes:  Good vision, no reported pain  ENT:  No sore throat, pain, runny nose, dysphagia  CV:  No pain, palpitations, hypo/hypertension  Resp:  No dyspnea, cough, tachypnea, wheezing  GI:  See HPI  :  No pain, bleeding, incontinence, nocturia  Muscle:  No pain, weakness  Neuro:  No weakness, tingling, memory problems  Psych:  No fatigue, insomnia, mood problems, depression  Endocrine:  No polyuria, polydipsia, cold/heat intolerance  Heme:  No petechiae, ecchymosis, easy bruisability  Skin:  No rash, edema      PHYSICAL EXAM:     GENERAL: NAD  HEENT:  NC/AT  CHEST:  Full & symmetric excursion, no increased effort  ABDOMEN:  Soft, non-tender, non-distended, +BS  EXTREMITIES:  no edema  SKIN:  No rash  NEURO:  Alert      Vital Signs:  Vital Signs Last 24 Hrs  T(C): 36.7 (17 Dec 2019 10:00), Max: 36.9 (17 Dec 2019 04:29)  T(F): 98 (17 Dec 2019 10:00), Max: 98.4 (17 Dec 2019 04:29)  HR: 74 (17 Dec 2019 10:00) (72 - 87)  BP: 104/56 (17 Dec 2019 10:00) (100/56 - 113/64)  BP(mean): --  RR: 18 (17 Dec 2019 10:00) (17 - 19)  SpO2: 100% (17 Dec 2019 10:00) (97% - 100%)  Daily     Daily     LABS:                        7.5    3.77  )-----------( 53       ( 17 Dec 2019 09:12 )             21.6     12-17    133<L>  |  98  |  22  ----------------------------<  94  3.6   |  26  |  1.09    Ca    9.8      17 Dec 2019 06:44    TPro  5.5<L>  /  Alb  3.1<L>  /  TBili  7.0<H>  /  DBili  x   /  AST  34  /  ALT  18  /  AlkPhos  135<H>  12-17    LIVER FUNCTIONS - ( 17 Dec 2019 06:44 )  Alb: 3.1 g/dL / Pro: 5.5 g/dL / ALK PHOS: 135 U/L / ALT: 18 U/L / AST: 34 U/L / GGT: x           PT/INR - ( 17 Dec 2019 09:15 )   PT: 23.8 sec;   INR: 2.06 ratio         PTT - ( 17 Dec 2019 09:15 )  PTT:48.0 sec        Imaging:  reviewed

## 2019-12-17 NOTE — PROGRESS NOTE ADULT - ASSESSMENT
Impression:  62yo M with Hypertension, DMT2, BPH, obesity, BMI 33, HFpEF grade 1, duodenal ulcer (May, Sep), GAVE s/p APC (Sep), JEAN cirrhosis, who presented from rehab on 11/26 with confusion and MISA creat 2.55, hyponatremia Na 125.   Course: UTI on 12/01. Improved, but continuing confusion. Now listed for transplant.    # JEAN cirrhosis, MELD-Na 12/14 28 (INR 2.23, Na 131, Creat 1.23, bili 8.2)      - varices: none on EGD outside in 10/2019      - HE: present, on high-dose lactulose 30 g q4hrs with 3 soft BM o/n, and Xifaxan      - Ascites: none currently, but history of ascites with SBP      - HCC: none on US 11/27, AFP 23 in 10/19; MRI w/o lesions from 12/6      - Vascular assessment: poor US doppler exam for eval of vessels due to body habitus       - Liver Workup: HAV immune, HBV non-immune, HCV neg, HIV neg, iron studies consistent wiith AoCD, RPR neg, QG neg, CMV neg, EBV IgG pos only, HSV1 pos, ASMA neg, AMA neg, ceruloplasmin LOW 12      - Age appropriate screening: colonoscopy 5-6 months ago per patient      - Dental evaluation: >1 year ago, no loose teeth per patient      - Cardiac evaluation: C completed 12/5, mild-mod non-occlusive CAD with low filling pressures, no interventions needed       - Blood type A+, Ab neg       - Transplant: listed at MELD-Na of 27 on Friday      - Social: retired, previous , lives in [place] with wife and son, previously highly functional (before October), Insurance -Medicare HMO  # MISA, Cr downtrending, likely pre-renal vs HRS; urine sodium still low, likely HRS  # Chest wall hematoma  # T2DM  # Hypertension (currently normotensive  # UTI on ertapenem    Recs:  - daily MELD-Na labs (CMP, INR) while her  - please obtain repeat urine culture to assess for clearance  - followup with transplant ID for antibiotic course  - continue lactulose and Xifaxan  - obtain pt's outpatient urology records and biopsy reports  - continue PT  - continue midodrine 20mg TID, continue to hold diuretics  - EGD/colon report in the chart  - ok for discharge from hepatology perspective to rehab  - outpatient  followup with Dr. Medina in the liver clinic next week - [phone]

## 2019-12-17 NOTE — PROGRESS NOTE ADULT - REASON FOR ADMISSION
difficulty to ambulate/ weakness
Weakness
difficulty to ambulate/ weakness

## 2019-12-17 NOTE — PROGRESS NOTE ADULT - SUBJECTIVE AND OBJECTIVE BOX
Patient is a 63y Male whom presented to the hospital with ckd and dena   pateint seen and examined nad , no fever , no chills      PAST MEDICAL & SURGICAL HISTORY:  GIB (gastrointestinal bleeding)  GERD with esophagitis: Gastritis &amp; Non Bleeding Ulcers  Hepatic encephalopathy  Obesity  Fatty liver disease, nonalcoholic  Renal stones: 25 years ago  Hypertension  Neuropathy  Hypercholesteremia  Diabetes  S/P cholecystectomy      MEDICATIONS  (STANDING):  dextrose 5%. 1000 milliLiter(s) (50 mL/Hr) IV Continuous <Continuous>  dextrose 50% Injectable 12.5 Gram(s) IV Push once  dextrose 50% Injectable 25 Gram(s) IV Push once  dextrose 50% Injectable 25 Gram(s) IV Push once  insulin lispro (HumaLOG) corrective regimen sliding scale   SubCutaneous three times a day before meals  insulin lispro (HumaLOG) corrective regimen sliding scale   SubCutaneous at bedtime      Allergies    codeine (Anaphylaxis)    Intolerances    NO  RED MEAT (Unknown)                                     SOCIAL HISTORY:  Denies ETOh,Smoking,     FAMILY HISTORY:  Family history of type 2 diabetes mellitus  Family history of hypertension  Family history of stomach cancer      REVIEW OF SYSTEMS:    CONSTITUTIONAL: No weakness, fevers or chills  RESPIRATORY: No cough, wheezing, hemoptysis; No shortness of breath  CARDIOVASCULAR: No chest pain or palpitations  GASTROINTESTINAL: No abdominal or epigastric pain. No nausea, vomiting,     No diarrhea or constipation. No melena   GENITOURINARY: No dysuria, frequency or hematuria  NEUROLOGICAL: No numbness or weakness  SKIN: dry                                                 7.5    3.77  )-----------( 53       ( 17 Dec 2019 09:12 )             21.6       CBC Full  -  ( 17 Dec 2019 09:12 )  WBC Count : 3.77 K/uL  RBC Count : 2.20 M/uL  Hemoglobin : 7.5 g/dL  Hematocrit : 21.6 %  Platelet Count - Automated : 53 K/uL  Mean Cell Volume : 98.2 fl  Mean Cell Hemoglobin : 34.1 pg  Mean Cell Hemoglobin Concentration : 34.7 gm/dL  Auto Neutrophil # : x  Auto Lymphocyte # : x  Auto Monocyte # : x  Auto Eosinophil # : x  Auto Basophil # : x  Auto Neutrophil % : x  Auto Lymphocyte % : x  Auto Monocyte % : x  Auto Eosinophil % : x  Auto Basophil % : x      12-17    133<L>  |  98  |  22  ----------------------------<  94  3.6   |  26  |  1.09    Ca    9.8      17 Dec 2019 06:44    TPro  5.5<L>  /  Alb  3.1<L>  /  TBili  7.0<H>  /  DBili  x   /  AST  34  /  ALT  18  /  AlkPhos  135<H>  12-17      CAPILLARY BLOOD GLUCOSE      POCT Blood Glucose.: 232 mg/dL (17 Dec 2019 12:21)  POCT Blood Glucose.: 112 mg/dL (17 Dec 2019 09:25)  POCT Blood Glucose.: 100 mg/dL (17 Dec 2019 08:22)  POCT Blood Glucose.: 185 mg/dL (16 Dec 2019 21:52)      Vital Signs Last 24 Hrs  T(C): 36.7 (17 Dec 2019 10:00), Max: 36.9 (17 Dec 2019 04:29)  T(F): 98 (17 Dec 2019 10:00), Max: 98.4 (17 Dec 2019 04:29)  HR: 74 (17 Dec 2019 10:00) (74 - 81)  BP: 104/56 (17 Dec 2019 10:00) (100/56 - 112/53)  BP(mean): --  RR: 18 (17 Dec 2019 10:00) (17 - 19)  SpO2: 100% (17 Dec 2019 10:00) (97% - 100%)        PT/INR - ( 17 Dec 2019 09:15 )   PT: 23.8 sec;   INR: 2.06 ratio         PTT - ( 17 Dec 2019 09:15 )  PTT:48.0 sec                 PHYSICAL EXAM:    Constitutional: NAD  HEENT: conjunctive   clear   Neck:  No JVD  Respiratory: CTAB  Cardiovascular: S1 and S2  Gastrointestinal: BS+, soft, NT/ND  Extremities: No peripheral edema  Neurological:  no focal deficits  Psychiatric: Normal mood, normal affect            Skin: dry   Access: Not applicable

## 2019-12-17 NOTE — PROGRESS NOTE ADULT - SUBJECTIVE AND OBJECTIVE BOX
[facility] - Sushila Dhaliwal, Doug, Morenita, Ayo, Colleen Williamson  Office Number:  [phone]    Patient resting comfortably in bed in NAD.  Laying flat with no respiratory distress.  No complaints of chest pain, dyspnea, palpitations, PND, or orthopnea.  feeling well    ROS: negative unless otherwise mentioned.    Telemetry:  off    MEDICATIONS  (STANDING):  artificial  tears Solution 1 Drop(s) Both EYES three times a day  atorvastatin 10 milliGRAM(s) Oral at bedtime  dextrose 5%. 1000 milliLiter(s) (50 mL/Hr) IV Continuous <Continuous>  dextrose 50% Injectable 12.5 Gram(s) IV Push once  dextrose 50% Injectable 25 Gram(s) IV Push once  dextrose 50% Injectable 25 Gram(s) IV Push once  influenza   Vaccine 0.5 milliLiter(s) IntraMuscular once  insulin glargine Injectable (LANTUS) 28 Unit(s) SubCutaneous at bedtime  insulin lispro (HumaLOG) corrective regimen sliding scale   SubCutaneous three times a day before meals  insulin lispro (HumaLOG) corrective regimen sliding scale   SubCutaneous at bedtime  insulin lispro Injectable (HumaLOG) 10 Unit(s) SubCutaneous three times a day before meals  lactulose Syrup 30 Gram(s) Oral every 4 hours  midodrine. 20 milliGRAM(s) Oral three times a day  pantoprazole    Tablet 40 milliGRAM(s) Oral before breakfast  rifAXIMin 550 milliGRAM(s) Oral two times a day  sucralfate 1 Gram(s) Oral two times a day  tamsulosin 0.4 milliGRAM(s) Oral at bedtime    MEDICATIONS  (PRN):  dextrose 40% Gel 15 Gram(s) Oral once PRN Blood Glucose LESS THAN 70 milliGRAM(s)/deciliter  glucagon  Injectable 1 milliGRAM(s) IntraMuscular once PRN Glucose LESS THAN 70 milligrams/deciliter      Allergies    codeine (Anaphylaxis)  Tomatoes (Unknown)    Intolerances    NO  RED MEAT (Unknown)      Vital Signs Last 24 Hrs  T(C): 36.9 (17 Dec 2019 04:29), Max: 36.9 (17 Dec 2019 04:29)  T(F): 98.4 (17 Dec 2019 04:29), Max: 98.4 (17 Dec 2019 04:29)  HR: 80 (17 Dec 2019 04:29) (72 - 87)  BP: 100/56 (17 Dec 2019 04:29) (100/56 - 113/64)  BP(mean): --  RR: 17 (17 Dec 2019 04:29) (17 - 19)  SpO2: 98% (17 Dec 2019 04:29) (97% - 99%)    I&O's Summary    16 Dec 2019 07:01  -  17 Dec 2019 07:00  --------------------------------------------------------  IN: 1230 mL / OUT: 1075 mL / NET: 155 mL        ON EXAM:    General: NAD, awake and alert, oriented x 3, obese  HEENT: Mucous membranes are moist, anicteric  Lungs: Non-labored, breath sounds are clear bilaterally, No wheezing, rales or rhonchi  Cardiovascular: Regular, S1 and S2, no murmurs, rubs, or gallops  Gastrointestinal: Bowel Sounds present, soft, nontender.   Lymph: No peripheral edema. No lymphadenopathy.  Skin: No rashes or ulcers  Psych:  Mood & affect appropriate      LABS: All Labs Reviewed:                        7.8    4.06  )-----------( 47       ( 16 Dec 2019 08:21 )             23.1                         8.4    4.08  )-----------( 43       ( 15 Dec 2019 11:22 )             25.2                         8.0    3.85  )-----------( 40       ( 14 Dec 2019 11:06 )             23.7     17 Dec 2019 06:44    133    |  98     |  22     ----------------------------<  94     3.6     |  26     |  1.09   16 Dec 2019 06:20    135    |  99     |  24     ----------------------------<  173    3.8     |  26     |  1.14   15 Dec 2019 07:22    134    |  98     |  25     ----------------------------<  185    3.8     |  26     |  1.25     Ca    9.8        17 Dec 2019 06:44  Ca    9.9        16 Dec 2019 06:20  Ca    10.5       15 Dec 2019 07:22    TPro  5.5    /  Alb  3.1    /  TBili  7.0    /  DBili  x      /  AST  34     /  ALT  18     /  AlkPhos  135    17 Dec 2019 06:44  TPro  5.6    /  Alb  3.3    /  TBili  7.1    /  DBili  x      /  AST  32     /  ALT  19     /  AlkPhos  144    16 Dec 2019 06:20    PT/INR - ( 16 Dec 2019 15:29 )   PT: 23.8 sec;   INR: 2.02 ratio         PTT - ( 16 Dec 2019 15:29 )  PTT:37.9 sec      Blood Culture:

## 2019-12-17 NOTE — PROGRESS NOTE ADULT - SUBJECTIVE AND OBJECTIVE BOX
INTERVAL HPI/OVERNIGHT EVENTS:    No new overnight event.  No N/V/D.  Tolerating diet.    MEDICATIONS  (STANDING):  artificial  tears Solution 1 Drop(s) Both EYES three times a day  atorvastatin 10 milliGRAM(s) Oral at bedtime  dextrose 5%. 1000 milliLiter(s) (50 mL/Hr) IV Continuous <Continuous>  dextrose 50% Injectable 12.5 Gram(s) IV Push once  dextrose 50% Injectable 25 Gram(s) IV Push once  dextrose 50% Injectable 25 Gram(s) IV Push once  insulin glargine Injectable (LANTUS) 28 Unit(s) SubCutaneous at bedtime  insulin lispro (HumaLOG) corrective regimen sliding scale   SubCutaneous three times a day before meals  insulin lispro (HumaLOG) corrective regimen sliding scale   SubCutaneous at bedtime  insulin lispro Injectable (HumaLOG) 10 Unit(s) SubCutaneous three times a day before meals  lactulose Syrup 30 Gram(s) Oral every 4 hours  midodrine. 20 milliGRAM(s) Oral three times a day  pantoprazole    Tablet 40 milliGRAM(s) Oral before breakfast  rifAXIMin 550 milliGRAM(s) Oral two times a day  sucralfate 1 Gram(s) Oral two times a day  tamsulosin 0.4 milliGRAM(s) Oral at bedtime    MEDICATIONS  (PRN):  dextrose 40% Gel 15 Gram(s) Oral once PRN Blood Glucose LESS THAN 70 milliGRAM(s)/deciliter  glucagon  Injectable 1 milliGRAM(s) IntraMuscular once PRN Glucose LESS THAN 70 milligrams/deciliter      Allergies    codeine (Anaphylaxis)  Tomatoes (Unknown)    Intolerances    NO  RED MEAT (Unknown)      Review of Systems:    General:  No wt loss, fevers, chills, night sweats,fatigue,   Eyes:  Good vision, no reported pain  ENT:  No sore throat, pain, runny nose, dysphagia  CV:  No pain, palpitations, hypo/hypertension  Resp:  No dyspnea, cough, tachypnea, wheezing  GI:  No pain, No nausea, No vomiting, No diarrhea, No constipation, No weight loss, No fever, No pruritis, No rectal bleeding, No melena, No dysphagia  :  No pain, bleeding, incontinence, nocturia  Muscle:  No pain, weakness  Neuro:  No weakness, tingling, memory problems  Psych:  No fatigue, insomnia, mood problems, depression  Endocrine:  No polyuria, polydypsia, cold/heat intolerance  Heme:  No petechiae, ecchymosis, easy bruisability  Skin:  No rash, tattoos, scars, edema      Vital Signs Last 24 Hrs  T(C): 36.7 (17 Dec 2019 10:00), Max: 36.9 (17 Dec 2019 04:29)  T(F): 98 (17 Dec 2019 10:00), Max: 98.4 (17 Dec 2019 04:29)  HR: 74 (17 Dec 2019 10:00) (74 - 87)  BP: 104/56 (17 Dec 2019 10:00) (100/56 - 113/64)  BP(mean): --  RR: 18 (17 Dec 2019 10:00) (17 - 19)  SpO2: 100% (17 Dec 2019 10:00) (97% - 100%)    PHYSICAL EXAM:    Constitutional: NAD, well-developed  HEENT: EOMI, throat clear  Neck: No LAD, supple  Respiratory: CTA and P  Cardiovascular: S1 and S2, RRR, no M  Gastrointestinal: BS+, soft, NT/ND, neg HSM,  Extremities: No peripheral edema, neg clubing, cyanosis  Vascular: 2+ peripheral pulses  Neurological: A/O x 3, no focal deficits  Psychiatric: Normal mood, normal affect  Skin: No rashes      LABS:                        7.5    3.77  )-----------( 53       ( 17 Dec 2019 09:12 )             21.6     12-17    133<L>  |  98  |  22  ----------------------------<  94  3.6   |  26  |  1.09    Ca    9.8      17 Dec 2019 06:44    TPro  5.5<L>  /  Alb  3.1<L>  /  TBili  7.0<H>  /  DBili  x   /  AST  34  /  ALT  18  /  AlkPhos  135<H>  12-17    PT/INR - ( 17 Dec 2019 09:15 )   PT: 23.8 sec;   INR: 2.06 ratio         PTT - ( 17 Dec 2019 09:15 )  PTT:48.0 sec      RADIOLOGY & ADDITIONAL TESTS:

## 2019-12-18 LAB — PLAT MORPH BLD: NORMAL — SIGNIFICANT CHANGE UP

## 2019-12-22 LAB
C IMMITIS AB FLD QL CF: NEGATIVE — SIGNIFICANT CHANGE UP
C IMMITIS IGM SPEC QL IA: NEGATIVE — SIGNIFICANT CHANGE UP
COCCIDIOIDES IGG SPEC QL IA: NEGATIVE — SIGNIFICANT CHANGE UP

## 2019-12-23 ENCOUNTER — FORM ENCOUNTER (OUTPATIENT)
Age: 64
End: 2019-12-23

## 2019-12-24 ENCOUNTER — APPOINTMENT (OUTPATIENT)
Dept: CT IMAGING | Facility: CLINIC | Age: 64
End: 2019-12-24
Payer: MEDICARE

## 2019-12-24 ENCOUNTER — OUTPATIENT (OUTPATIENT)
Dept: OUTPATIENT SERVICES | Facility: HOSPITAL | Age: 64
LOS: 1 days | End: 2019-12-24
Payer: COMMERCIAL

## 2019-12-24 DIAGNOSIS — Z00.8 ENCOUNTER FOR OTHER GENERAL EXAMINATION: ICD-10-CM

## 2019-12-24 DIAGNOSIS — Z90.49 ACQUIRED ABSENCE OF OTHER SPECIFIED PARTS OF DIGESTIVE TRACT: Chronic | ICD-10-CM

## 2019-12-24 PROCEDURE — 73700 CT LOWER EXTREMITY W/O DYE: CPT

## 2019-12-24 PROCEDURE — 73700 CT LOWER EXTREMITY W/O DYE: CPT | Mod: 26,LT

## 2019-12-25 ENCOUNTER — FORM ENCOUNTER (OUTPATIENT)
Age: 64
End: 2019-12-25

## 2019-12-26 ENCOUNTER — APPOINTMENT (OUTPATIENT)
Dept: ULTRASOUND IMAGING | Facility: CLINIC | Age: 64
End: 2019-12-26

## 2019-12-26 ENCOUNTER — APPOINTMENT (OUTPATIENT)
Dept: HEPATOLOGY | Facility: CLINIC | Age: 64
End: 2019-12-26
Payer: MEDICARE

## 2019-12-26 ENCOUNTER — INPATIENT (INPATIENT)
Facility: HOSPITAL | Age: 64
LOS: 14 days | Discharge: NURSING, REHAB OR LONG-TERM CARE | DRG: 605 | End: 2020-01-10
Attending: INTERNAL MEDICINE | Admitting: STUDENT IN AN ORGANIZED HEALTH CARE EDUCATION/TRAINING PROGRAM
Payer: MEDICARE

## 2019-12-26 ENCOUNTER — OUTPATIENT (OUTPATIENT)
Dept: OUTPATIENT SERVICES | Facility: HOSPITAL | Age: 64
LOS: 1 days | End: 2019-12-26
Payer: MEDICARE

## 2019-12-26 ENCOUNTER — LABORATORY RESULT (OUTPATIENT)
Age: 64
End: 2019-12-26

## 2019-12-26 VITALS
RESPIRATION RATE: 18 BRPM | SYSTOLIC BLOOD PRESSURE: 130 MMHG | TEMPERATURE: 98 F | WEIGHT: 270.07 LBS | DIASTOLIC BLOOD PRESSURE: 60 MMHG | OXYGEN SATURATION: 98 % | HEART RATE: 86 BPM | HEIGHT: 73 IN

## 2019-12-26 VITALS
HEART RATE: 83 BPM | TEMPERATURE: 97.8 F | SYSTOLIC BLOOD PRESSURE: 85 MMHG | DIASTOLIC BLOOD PRESSURE: 43 MMHG | BODY MASS INDEX: 37.93 KG/M2 | WEIGHT: 280 LBS | HEIGHT: 72 IN

## 2019-12-26 DIAGNOSIS — M79.89 OTHER SPECIFIED SOFT TISSUE DISORDERS: ICD-10-CM

## 2019-12-26 DIAGNOSIS — Z90.49 ACQUIRED ABSENCE OF OTHER SPECIFIED PARTS OF DIGESTIVE TRACT: Chronic | ICD-10-CM

## 2019-12-26 DIAGNOSIS — K92.2 GASTROINTESTINAL HEMORRHAGE, UNSPECIFIED: ICD-10-CM

## 2019-12-26 LAB
ALBUMIN SERPL ELPH-MCNC: 3.1 G/DL — LOW (ref 3.3–5)
ALP SERPL-CCNC: 157 U/L — HIGH (ref 40–120)
ALT FLD-CCNC: 25 U/L — SIGNIFICANT CHANGE UP (ref 10–45)
ANION GAP SERPL CALC-SCNC: 14 MMOL/L — SIGNIFICANT CHANGE UP (ref 5–17)
APTT BLD: 39.6 SEC — HIGH (ref 27.5–36.3)
AST SERPL-CCNC: 42 U/L — HIGH (ref 10–40)
BASOPHILS # BLD AUTO: 0.02 K/UL — SIGNIFICANT CHANGE UP (ref 0–0.2)
BASOPHILS # BLD AUTO: 0.04 K/UL
BASOPHILS NFR BLD AUTO: 0.4 % — SIGNIFICANT CHANGE UP (ref 0–2)
BASOPHILS NFR BLD AUTO: 0.8 %
BILIRUB SERPL-MCNC: 8.9 MG/DL — HIGH (ref 0.2–1.2)
BLD GP AB SCN SERPL QL: NEGATIVE — SIGNIFICANT CHANGE UP
BUN SERPL-MCNC: 19 MG/DL — SIGNIFICANT CHANGE UP (ref 7–23)
CALCIUM SERPL-MCNC: 9 MG/DL — SIGNIFICANT CHANGE UP (ref 8.4–10.5)
CHLORIDE SERPL-SCNC: 100 MMOL/L — SIGNIFICANT CHANGE UP (ref 96–108)
CO2 SERPL-SCNC: 20 MMOL/L — LOW (ref 22–31)
CREAT SERPL-MCNC: 1.18 MG/DL — SIGNIFICANT CHANGE UP (ref 0.5–1.3)
EOSINOPHIL # BLD AUTO: 0.09 K/UL — SIGNIFICANT CHANGE UP (ref 0–0.5)
EOSINOPHIL # BLD AUTO: 0.11 K/UL
EOSINOPHIL NFR BLD AUTO: 1.8 % — SIGNIFICANT CHANGE UP (ref 0–6)
EOSINOPHIL NFR BLD AUTO: 2.2 %
GLUCOSE SERPL-MCNC: 171 MG/DL — HIGH (ref 70–99)
HCT VFR BLD CALC: 17.2 % — CRITICAL LOW (ref 39–50)
HCT VFR BLD CALC: 19 %
HGB BLD-MCNC: 5.6 G/DL — CRITICAL LOW (ref 13–17)
HGB BLD-MCNC: 6.1 G/DL
IMM GRANULOCYTES NFR BLD AUTO: 1.2 %
IMM GRANULOCYTES NFR BLD AUTO: 1.2 % — SIGNIFICANT CHANGE UP (ref 0–1.5)
INR BLD: 2.02 RATIO — HIGH (ref 0.88–1.16)
INR PPP: 2 RATIO
LYMPHOCYTES # BLD AUTO: 1.35 K/UL
LYMPHOCYTES # BLD AUTO: 1.41 K/UL — SIGNIFICANT CHANGE UP (ref 1–3.3)
LYMPHOCYTES # BLD AUTO: 27.7 % — SIGNIFICANT CHANGE UP (ref 13–44)
LYMPHOCYTES NFR BLD AUTO: 27.3 %
MAN DIFF?: NORMAL
MANUAL SMEAR VERIFICATION: SIGNIFICANT CHANGE UP
MCHC RBC-ENTMCNC: 32.1 GM/DL
MCHC RBC-ENTMCNC: 32.6 GM/DL — SIGNIFICANT CHANGE UP (ref 32–36)
MCHC RBC-ENTMCNC: 33.5 PG — SIGNIFICANT CHANGE UP (ref 27–34)
MCHC RBC-ENTMCNC: 33.9 PG
MCV RBC AUTO: 103 FL — HIGH (ref 80–100)
MCV RBC AUTO: 105.6 FL
MONOCYTES # BLD AUTO: 0.5 K/UL
MONOCYTES # BLD AUTO: 0.63 K/UL — SIGNIFICANT CHANGE UP (ref 0–0.9)
MONOCYTES NFR BLD AUTO: 10.1 %
MONOCYTES NFR BLD AUTO: 12.4 % — SIGNIFICANT CHANGE UP (ref 2–14)
NEUTROPHILS # BLD AUTO: 2.88 K/UL
NEUTROPHILS # BLD AUTO: 2.88 K/UL — SIGNIFICANT CHANGE UP (ref 1.8–7.4)
NEUTROPHILS NFR BLD AUTO: 56.5 % — SIGNIFICANT CHANGE UP (ref 43–77)
NEUTROPHILS NFR BLD AUTO: 58.4 %
NRBC # BLD: 0 /100 WBCS — SIGNIFICANT CHANGE UP (ref 0–0)
PLAT MORPH BLD: NORMAL — SIGNIFICANT CHANGE UP
PLATELET # BLD AUTO: 79 K/UL — LOW (ref 150–400)
PLATELET # BLD AUTO: 87 K/UL
POTASSIUM SERPL-MCNC: 4.3 MMOL/L — SIGNIFICANT CHANGE UP (ref 3.5–5.3)
POTASSIUM SERPL-SCNC: 4.3 MMOL/L — SIGNIFICANT CHANGE UP (ref 3.5–5.3)
PROT SERPL-MCNC: 5.7 G/DL — LOW (ref 6–8.3)
PROTHROM AB SERPL-ACNC: 23.8 SEC — HIGH (ref 10–12.9)
PT BLD: 23.3 SEC
RBC # BLD: 1.67 M/UL — LOW (ref 4.2–5.8)
RBC # BLD: 1.8 M/UL
RBC # FLD: 20.9 % — HIGH (ref 10.3–14.5)
RBC # FLD: 21.1 %
RBC BLD AUTO: SIGNIFICANT CHANGE UP
RH IG SCN BLD-IMP: POSITIVE — SIGNIFICANT CHANGE UP
SODIUM SERPL-SCNC: 134 MMOL/L — LOW (ref 135–145)
WBC # BLD: 5.09 K/UL — SIGNIFICANT CHANGE UP (ref 3.8–10.5)
WBC # FLD AUTO: 4.94 K/UL
WBC # FLD AUTO: 5.09 K/UL — SIGNIFICANT CHANGE UP (ref 3.8–10.5)

## 2019-12-26 PROCEDURE — 93970 EXTREMITY STUDY: CPT

## 2019-12-26 PROCEDURE — 99215 OFFICE O/P EST HI 40 MIN: CPT

## 2019-12-26 PROCEDURE — 93970 EXTREMITY STUDY: CPT | Mod: 26

## 2019-12-26 PROCEDURE — 71045 X-RAY EXAM CHEST 1 VIEW: CPT | Mod: 26

## 2019-12-26 PROCEDURE — 99285 EMERGENCY DEPT VISIT HI MDM: CPT

## 2019-12-26 RX ORDER — PANTOPRAZOLE SODIUM 20 MG/1
80 TABLET, DELAYED RELEASE ORAL ONCE
Refills: 0 | Status: COMPLETED | OUTPATIENT
Start: 2019-12-26 | End: 2019-12-26

## 2019-12-26 RX ORDER — TUBERCULIN,PURIF.PROT.DERIV. 5 T/0.1 ML
1 VIAL (ML) INTRADERMAL
Refills: 0 | Status: DISCONTINUED | COMMUNITY
End: 2019-12-26

## 2019-12-26 RX ADMIN — PANTOPRAZOLE SODIUM 80 MILLIGRAM(S): 20 TABLET, DELAYED RELEASE ORAL at 21:40

## 2019-12-26 NOTE — ED ADULT NURSE NOTE - CHIEF COMPLAINT QUOTE
brought in by ems from [facility] for abnormal lab results  H/H 6.8/19.8

## 2019-12-26 NOTE — REVIEW OF SYSTEMS
[Fatigue] : fatigue [Sclera anicteric] : sclera anicteric [Muscle Pain] : muscle pain [Anemia] : anemia [Negative] : Psychiatric [Fever] : no fever [Chills] : no chills [Night Sweats] : no night sweats [Sore throat] : no sore throat [Pain/Stiffness] : no pain/stiffness [Trauma] : no trauma [FreeTextEntry9] : Reports pain in the left thigh.

## 2019-12-26 NOTE — PHYSICAL EXAM
[No Acute Distress] : no acute distress [Scleral Icterus] : scleral icterus [EOMI] : extra ocular movement intact [Normal Hearing] : normal hearing [No Neck Mass] : no neck mass [Supple] : the neck was supple [No JVD] : no jugular venous distention [Clear to Auscultation] : lungs were clear to auscultation bilaterally [No Respiratory Distress] : no respiratory distress [Normal PMI] : normal PMI [Normal S1, S2] : normal S1 and S2 [Normal Rate/Rhythm] : normal rate/rhythm [Carotids Normal] : carotids normal [Splenomegaly] : splenomegaly [Spider Angioma] : spider angioma [No Focal Deficits] : no focal deficits [Hepatojugular Reflux] : no hepatojugular reflux [Abdominal Bruit] : no abdominal bruit [Abdominal Ascites] : no abdominal ascites [Ascites Fluid Wave] : no ascites fluid wave [Ascites Tense] : no ascites tense [Caput Medusae] : no caput medusae [Ascites Shifting Dullness] : no ascites shifting dullness [Asterixis] : no asterixis [Depression] : no depression [de-identified] : Soft ESM + [de-identified] : Patient appeare din the clinic on a wheel chair. Still appears overall poor strength in his lower extremities.

## 2019-12-26 NOTE — HISTORY OF PRESENT ILLNESS
[TextBox_42] : Mr. 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 His other medical problems include hypertension, hyperlipidemia, diabetes mellitus,fraility and hx of falls. He was recently admitted at the [facility] with complaints of worsening jaundice, dizziness and falls.  He was evaluted for a potentila liver transplant and is now liested in the UNOS list.\par Today he comes for a follow up  following his hospital discharge. He reports mild pain in the left lower extremity particularly in the thigh. He reports that he had a CT scan done recently in the rehabilitation facility for evaluation of his left lower extremity pain. He otherwise feels well. He denies any nausea, vomiting, abdominal pain, headache.\par He is currently getting physical therapy and the [facility] inpatient rehabilitation facility. \par Recent labs from 22 December 2019 was reviewed. This revealed AST 38, ALT 22, total bilirubin 7.8, WBC 68205. His hemoglobin was 7.6 with platelet count 78,000. His INR was 2.1

## 2019-12-26 NOTE — ED ADULT NURSE NOTE - OBJECTIVE STATEMENT
Pt presents to ED awake, alert and ambulatory with assistance, brought in by EMS from [facility] c/o low hemoglobin of 6.8. Pt states he is at [facility] for rehab after being admitted to the hospital for fluid retention. Pt states he was told his blood counts are low on labs drawn today. Pt reports generalized weakness- 5 days ago he had new onset BLLE weakness and has been getting PT and walking with a walker. Pt saw his liver doctor today- he is on the liver transplant list currently. Pt denies rectal bleeding, dark stools, vomiting, or bleeding from anywhere. Pt appears with yellow sclera and jaundice skin which he states is baseline. Respirations even and unlabored.

## 2019-12-26 NOTE — ASSESSMENT
[FreeTextEntry1] : Mr. 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 His other medical problems include hypertension, hyperlipidemia, diabetes mellitus,fraility and hx of falls. Patient is currently listed for liver transplant on the UNOS list.\par \par PLAN\par -I will have her gallbladder that will include CBC, CMP, PT/INR. Additionally in the office anemia I will check B12, folate, iron profile.\par - I also check a PSA level. His last PSA level was measured while he had an active UTI infection. I suspect level may be lower.\par - Continue physical therapy and rehabilitation center\par -  and nutritionist also will evaluate him today.\par -With rash to his complaints of pain in the left thigh I will plan for a Doppler ultrasound of the left lower extremity to rule out DVT today.\par -He denies any interval episodes of hepatic encephalopathy. He'll continue with lactulose with dose titration for 2-3 bowel movements daily. In addition we'll continue with the rifaximin 550 mg twice daily.\par -He will also continue with Midodrine 20 mg 3 times daily.\par - He is currently not on any diuretics. I will follow labs fom today and then we will recommend diuretics based on creatinine level. he recently had an episode of MISA.\par -Continue with Pantoprazole 40 mg PO bid iin view if the recent DU.\par - Keep patient listed on the UNOS list\par \par RTC in 1-2 weeks.

## 2019-12-26 NOTE — ED PROVIDER NOTE - ATTENDING CONTRIBUTION TO CARE
63M w/ pmh HTN, HLD, DM, non-alcoholic cirrhosis, chronic thrombocytopenia here for anemia from lr but also c/o l leg pain with mid lateral thigh tenderness, induration, check for cellulitis but nl le us, and ct of leg as per patient. denies blood per rectum, abd soft, nt. h/o pud but no esophageal varices

## 2019-12-26 NOTE — ED ADULT NURSE REASSESSMENT NOTE - NS ED NURSE REASSESS COMMENT FT1
Pt resting in stretcher in NAD and VSS. All blood specimens sent to lab and type and screen sent to blood bank. Pt encouraged to communicate any needs to staff.

## 2019-12-26 NOTE — ED PROVIDER NOTE - OBJECTIVE STATEMENT
63 y/o M with pmhx of GIB, GERD with esophagitis, Hepatic encephalopathy, fatty liver disease, HTN, neuropathy, hypercholesteremia, DM and PSHx of cholecystectomy presents to the ED [facility]EM[facility] from [facility] for abnormal lab c/o pain and shaking in the legs beginning today. Pt has been in rehab for 30 days due to his inability to ambulate s/p fall x1 month ago. PCp 65 y/o M with pmhx of GIB, GERD with esophagitis, Hepatic encephalopathy, fatty liver disease, HTN, neuropathy, hypercholesteremia, DM and PSHx of cholecystectomy presents to the ED EMS from [facility] for abnormal lab c/o pain and shaking in the legs beginning today. Pt has been in rehab for 30 days due to his inability to ambulate s/p fall x1 month ago. PCP ordered a Sonogram and CT for the left leg and results were normal, per pt. PCP stated that his H/H were 6.8 which prompted ED visit. Denies ETOH abuse, fall or injuries, hematemesis, or blood in stool.

## 2019-12-26 NOTE — ED PROVIDER NOTE - CLINICAL SUMMARY MEDICAL DECISION MAKING FREE TEXT BOX
See Attending Note 65 y/o M with pmhx of GIB, Non Alcoholic Hepatic Cirrhosis, PUD - presents with outpatient Hb of 6.0; asymptomatic this time without any chest pain, nausea, vomiting, abdominal pain, black stools/bloody stools. Will obtain CBC, CMP, EKG, and Guaiac. Consider transfusion of pRBC's if significantly deranged from prior H/H given chronic anemia. Will need EGD/Colonoscopy for further evaluation of bleeding source.     See Attending Note

## 2019-12-27 ENCOUNTER — TRANSCRIPTION ENCOUNTER (OUTPATIENT)
Age: 64
End: 2019-12-27

## 2019-12-27 DIAGNOSIS — Z29.9 ENCOUNTER FOR PROPHYLACTIC MEASURES, UNSPECIFIED: ICD-10-CM

## 2019-12-27 DIAGNOSIS — D69.6 THROMBOCYTOPENIA, UNSPECIFIED: ICD-10-CM

## 2019-12-27 DIAGNOSIS — K21.0 GASTRO-ESOPHAGEAL REFLUX DISEASE WITH ESOPHAGITIS: ICD-10-CM

## 2019-12-27 DIAGNOSIS — D64.9 ANEMIA, UNSPECIFIED: ICD-10-CM

## 2019-12-27 DIAGNOSIS — E11.9 TYPE 2 DIABETES MELLITUS WITHOUT COMPLICATIONS: ICD-10-CM

## 2019-12-27 DIAGNOSIS — I50.30 UNSPECIFIED DIASTOLIC (CONGESTIVE) HEART FAILURE: ICD-10-CM

## 2019-12-27 DIAGNOSIS — K92.2 GASTROINTESTINAL HEMORRHAGE, UNSPECIFIED: ICD-10-CM

## 2019-12-27 DIAGNOSIS — K74.60 UNSPECIFIED CIRRHOSIS OF LIVER: ICD-10-CM

## 2019-12-27 LAB
ALBUMIN SERPL ELPH-MCNC: 2.8 G/DL — LOW (ref 3.3–5)
ALBUMIN SERPL ELPH-MCNC: 3 G/DL
ALP BLD-CCNC: 162 U/L
ALP SERPL-CCNC: 125 U/L — HIGH (ref 40–120)
ALT FLD-CCNC: 21 U/L — SIGNIFICANT CHANGE UP (ref 10–45)
ALT SERPL-CCNC: 24 U/L
AMMONIA BLD-MCNC: 80 UMOL/L — HIGH (ref 11–55)
ANION GAP SERPL CALC-SCNC: 10 MMOL/L — SIGNIFICANT CHANGE UP (ref 5–17)
ANION GAP SERPL CALC-SCNC: 11 MMOL/L
AST SERPL-CCNC: 33 U/L — SIGNIFICANT CHANGE UP (ref 10–40)
AST SERPL-CCNC: 42 U/L
BASOPHILS # BLD AUTO: 0.03 K/UL — SIGNIFICANT CHANGE UP (ref 0–0.2)
BASOPHILS NFR BLD AUTO: 0.7 % — SIGNIFICANT CHANGE UP (ref 0–2)
BILIRUB SERPL-MCNC: 9.4 MG/DL
BILIRUB SERPL-MCNC: 9.7 MG/DL — HIGH (ref 0.2–1.2)
BUN SERPL-MCNC: 18 MG/DL — SIGNIFICANT CHANGE UP (ref 7–23)
BUN SERPL-MCNC: 19 MG/DL
CALCIUM SERPL-MCNC: 9.1 MG/DL
CALCIUM SERPL-MCNC: 9.4 MG/DL — SIGNIFICANT CHANGE UP (ref 8.4–10.5)
CHLORIDE SERPL-SCNC: 100 MMOL/L
CHLORIDE SERPL-SCNC: 102 MMOL/L — SIGNIFICANT CHANGE UP (ref 96–108)
CO2 SERPL-SCNC: 24 MMOL/L
CO2 SERPL-SCNC: 25 MMOL/L — SIGNIFICANT CHANGE UP (ref 22–31)
CREAT SERPL-MCNC: 1.17 MG/DL — SIGNIFICANT CHANGE UP (ref 0.5–1.3)
CREAT SERPL-MCNC: 1.23 MG/DL
EOSINOPHIL # BLD AUTO: 0.12 K/UL — SIGNIFICANT CHANGE UP (ref 0–0.5)
EOSINOPHIL NFR BLD AUTO: 2.7 % — SIGNIFICANT CHANGE UP (ref 0–6)
FERRITIN SERPL-MCNC: 481 NG/ML
FERRITIN SERPL-MCNC: 513 NG/ML — HIGH (ref 30–400)
FOLATE SERPL-MCNC: 10.9 NG/ML — SIGNIFICANT CHANGE UP
FOLATE SERPL-MCNC: 12.4 NG/ML
GLUCOSE SERPL-MCNC: 149 MG/DL
GLUCOSE SERPL-MCNC: 85 MG/DL — SIGNIFICANT CHANGE UP (ref 70–99)
HAPTOGLOB SERPL-MCNC: <20 MG/DL — LOW (ref 34–200)
HCT VFR BLD CALC: 21.2 % — LOW (ref 39–50)
HCT VFR BLD CALC: 21.7 % — LOW (ref 39–50)
HCT VFR BLD CALC: 23.2 % — LOW (ref 39–50)
HGB BLD-MCNC: 6.9 G/DL — CRITICAL LOW (ref 13–17)
HGB BLD-MCNC: 7.1 G/DL — LOW (ref 13–17)
HGB BLD-MCNC: 7.8 G/DL — LOW (ref 13–17)
IMM GRANULOCYTES NFR BLD AUTO: 0.7 % — SIGNIFICANT CHANGE UP (ref 0–1.5)
IRON SATN MFR SERPL: 72 %
IRON SERPL-MCNC: 104 UG/DL
LDH SERPL L TO P-CCNC: 199 U/L — SIGNIFICANT CHANGE UP (ref 50–242)
LYMPHOCYTES # BLD AUTO: 1.35 K/UL — SIGNIFICANT CHANGE UP (ref 1–3.3)
LYMPHOCYTES # BLD AUTO: 30 % — SIGNIFICANT CHANGE UP (ref 13–44)
MAGNESIUM SERPL-MCNC: 1.8 MG/DL — SIGNIFICANT CHANGE UP (ref 1.6–2.6)
MCHC RBC-ENTMCNC: 31.8 PG — SIGNIFICANT CHANGE UP (ref 27–34)
MCHC RBC-ENTMCNC: 32 PG — SIGNIFICANT CHANGE UP (ref 27–34)
MCHC RBC-ENTMCNC: 32.5 GM/DL — SIGNIFICANT CHANGE UP (ref 32–36)
MCHC RBC-ENTMCNC: 32.6 PG — SIGNIFICANT CHANGE UP (ref 27–34)
MCHC RBC-ENTMCNC: 32.7 GM/DL — SIGNIFICANT CHANGE UP (ref 32–36)
MCHC RBC-ENTMCNC: 33.6 GM/DL — SIGNIFICANT CHANGE UP (ref 32–36)
MCV RBC AUTO: 97.1 FL — SIGNIFICANT CHANGE UP (ref 80–100)
MCV RBC AUTO: 97.7 FL — SIGNIFICANT CHANGE UP (ref 80–100)
MCV RBC AUTO: 97.7 FL — SIGNIFICANT CHANGE UP (ref 80–100)
MONOCYTES # BLD AUTO: 0.53 K/UL — SIGNIFICANT CHANGE UP (ref 0–0.9)
MONOCYTES NFR BLD AUTO: 11.8 % — SIGNIFICANT CHANGE UP (ref 2–14)
NEUTROPHILS # BLD AUTO: 2.44 K/UL — SIGNIFICANT CHANGE UP (ref 1.8–7.4)
NEUTROPHILS NFR BLD AUTO: 54.1 % — SIGNIFICANT CHANGE UP (ref 43–77)
NRBC # BLD: 0 /100 WBCS — SIGNIFICANT CHANGE UP (ref 0–0)
OB PNL STL: NEGATIVE — SIGNIFICANT CHANGE UP
PHOSPHATE SERPL-MCNC: 2.9 MG/DL — SIGNIFICANT CHANGE UP (ref 2.5–4.5)
PLATELET # BLD AUTO: 79 K/UL — LOW (ref 150–400)
PLATELET # BLD AUTO: 83 K/UL — LOW (ref 150–400)
PLATELET # BLD AUTO: 86 K/UL — LOW (ref 150–400)
POTASSIUM SERPL-MCNC: 3.9 MMOL/L — SIGNIFICANT CHANGE UP (ref 3.5–5.3)
POTASSIUM SERPL-SCNC: 3.9 MMOL/L — SIGNIFICANT CHANGE UP (ref 3.5–5.3)
POTASSIUM SERPL-SCNC: 4.2 MMOL/L
PROT SERPL-MCNC: 5.1 G/DL — LOW (ref 6–8.3)
PROT SERPL-MCNC: 5.6 G/DL
PSA SERPL-MCNC: 0.33 NG/ML
RBC # BLD: 2.17 M/UL — LOW (ref 4.2–5.8)
RBC # BLD: 2.22 M/UL — LOW (ref 4.2–5.8)
RBC # BLD: 2.31 M/UL — LOW (ref 4.2–5.8)
RBC # BLD: 2.39 M/UL — LOW (ref 4.2–5.8)
RBC # FLD: 23.9 % — HIGH (ref 10.3–14.5)
RBC # FLD: 24.9 % — HIGH (ref 10.3–14.5)
RBC # FLD: 25.1 % — HIGH (ref 10.3–14.5)
RETICS #: 126.1 K/UL — HIGH (ref 25–125)
RETICS/RBC NFR: 5.5 % — HIGH (ref 0.5–2.5)
SODIUM SERPL-SCNC: 135 MMOL/L
SODIUM SERPL-SCNC: 137 MMOL/L — SIGNIFICANT CHANGE UP (ref 135–145)
TIBC SERPL-MCNC: 145 UG/DL
UIBC SERPL-MCNC: 41 UG/DL
VIT B12 SERPL-MCNC: 1334 PG/ML — HIGH (ref 232–1245)
VIT B12 SERPL-MCNC: 1408 PG/ML — HIGH (ref 232–1245)
VIT B12 SERPL-MCNC: 1482 PG/ML
WBC # BLD: 4.5 K/UL — SIGNIFICANT CHANGE UP (ref 3.8–10.5)
WBC # BLD: 5.03 K/UL — SIGNIFICANT CHANGE UP (ref 3.8–10.5)
WBC # BLD: 5.09 K/UL — SIGNIFICANT CHANGE UP (ref 3.8–10.5)
WBC # FLD AUTO: 4.5 K/UL — SIGNIFICANT CHANGE UP (ref 3.8–10.5)
WBC # FLD AUTO: 5.03 K/UL — SIGNIFICANT CHANGE UP (ref 3.8–10.5)
WBC # FLD AUTO: 5.09 K/UL — SIGNIFICANT CHANGE UP (ref 3.8–10.5)

## 2019-12-27 PROCEDURE — 99223 1ST HOSP IP/OBS HIGH 75: CPT | Mod: GC

## 2019-12-27 PROCEDURE — 74176 CT ABD & PELVIS W/O CONTRAST: CPT | Mod: 26

## 2019-12-27 PROCEDURE — 99222 1ST HOSP IP/OBS MODERATE 55: CPT

## 2019-12-27 RX ORDER — LACTULOSE 10 G/15ML
30 SOLUTION ORAL EVERY 6 HOURS
Refills: 0 | Status: DISCONTINUED | OUTPATIENT
Start: 2019-12-27 | End: 2019-12-27

## 2019-12-27 RX ORDER — PANTOPRAZOLE SODIUM 20 MG/1
40 TABLET, DELAYED RELEASE ORAL EVERY 12 HOURS
Refills: 0 | Status: DISCONTINUED | OUTPATIENT
Start: 2019-12-27 | End: 2019-12-27

## 2019-12-27 RX ORDER — DEXTROSE 50 % IN WATER 50 %
25 SYRINGE (ML) INTRAVENOUS ONCE
Refills: 0 | Status: DISCONTINUED | OUTPATIENT
Start: 2019-12-27 | End: 2020-01-10

## 2019-12-27 RX ORDER — SODIUM CHLORIDE 9 MG/ML
1000 INJECTION, SOLUTION INTRAVENOUS
Refills: 0 | Status: DISCONTINUED | OUTPATIENT
Start: 2019-12-27 | End: 2020-01-10

## 2019-12-27 RX ORDER — CEFTRIAXONE 500 MG/1
1000 INJECTION, POWDER, FOR SOLUTION INTRAMUSCULAR; INTRAVENOUS EVERY 24 HOURS
Refills: 0 | Status: DISCONTINUED | OUTPATIENT
Start: 2019-12-27 | End: 2019-12-31

## 2019-12-27 RX ORDER — LACTULOSE 10 G/15ML
30 SOLUTION ORAL EVERY 4 HOURS
Refills: 0 | Status: DISCONTINUED | OUTPATIENT
Start: 2019-12-27 | End: 2019-12-27

## 2019-12-27 RX ORDER — TAMSULOSIN HYDROCHLORIDE 0.4 MG/1
0.4 CAPSULE ORAL AT BEDTIME
Refills: 0 | Status: DISCONTINUED | OUTPATIENT
Start: 2019-12-27 | End: 2020-01-10

## 2019-12-27 RX ORDER — LACTULOSE 10 G/15ML
20 SOLUTION ORAL EVERY 6 HOURS
Refills: 0 | Status: DISCONTINUED | OUTPATIENT
Start: 2019-12-27 | End: 2019-12-31

## 2019-12-27 RX ORDER — GLUCAGON INJECTION, SOLUTION 0.5 MG/.1ML
1 INJECTION, SOLUTION SUBCUTANEOUS ONCE
Refills: 0 | Status: DISCONTINUED | OUTPATIENT
Start: 2019-12-27 | End: 2020-01-10

## 2019-12-27 RX ORDER — PANTOPRAZOLE SODIUM 20 MG/1
8 TABLET, DELAYED RELEASE ORAL
Qty: 80 | Refills: 0 | Status: DISCONTINUED | OUTPATIENT
Start: 2019-12-27 | End: 2020-01-02

## 2019-12-27 RX ORDER — MIDODRINE HYDROCHLORIDE 2.5 MG/1
20 TABLET ORAL THREE TIMES A DAY
Refills: 0 | Status: DISCONTINUED | OUTPATIENT
Start: 2019-12-27 | End: 2020-01-10

## 2019-12-27 RX ORDER — SUCRALFATE 1 G
1 TABLET ORAL
Refills: 0 | Status: DISCONTINUED | OUTPATIENT
Start: 2019-12-27 | End: 2019-12-31

## 2019-12-27 RX ORDER — INSULIN GLARGINE 100 [IU]/ML
28 INJECTION, SOLUTION SUBCUTANEOUS AT BEDTIME
Refills: 0 | Status: DISCONTINUED | OUTPATIENT
Start: 2019-12-27 | End: 2020-01-03

## 2019-12-27 RX ORDER — DEXTROSE 50 % IN WATER 50 %
12.5 SYRINGE (ML) INTRAVENOUS ONCE
Refills: 0 | Status: DISCONTINUED | OUTPATIENT
Start: 2019-12-27 | End: 2020-01-10

## 2019-12-27 RX ORDER — DEXTROSE 50 % IN WATER 50 %
15 SYRINGE (ML) INTRAVENOUS ONCE
Refills: 0 | Status: DISCONTINUED | OUTPATIENT
Start: 2019-12-27 | End: 2020-01-10

## 2019-12-27 RX ORDER — INSULIN LISPRO 100/ML
VIAL (ML) SUBCUTANEOUS AT BEDTIME
Refills: 0 | Status: DISCONTINUED | OUTPATIENT
Start: 2019-12-27 | End: 2020-01-10

## 2019-12-27 RX ORDER — INSULIN LISPRO 100/ML
8 VIAL (ML) SUBCUTANEOUS
Refills: 0 | Status: DISCONTINUED | OUTPATIENT
Start: 2019-12-27 | End: 2020-01-10

## 2019-12-27 RX ORDER — INSULIN LISPRO 100/ML
VIAL (ML) SUBCUTANEOUS
Refills: 0 | Status: DISCONTINUED | OUTPATIENT
Start: 2019-12-27 | End: 2020-01-10

## 2019-12-27 RX ADMIN — LACTULOSE 20 GRAM(S): 10 SOLUTION ORAL at 22:28

## 2019-12-27 RX ADMIN — MIDODRINE HYDROCHLORIDE 20 MILLIGRAM(S): 2.5 TABLET ORAL at 11:13

## 2019-12-27 RX ADMIN — Medication 1 GRAM(S): at 05:42

## 2019-12-27 RX ADMIN — Medication 1 GRAM(S): at 17:50

## 2019-12-27 RX ADMIN — TAMSULOSIN HYDROCHLORIDE 0.4 MILLIGRAM(S): 0.4 CAPSULE ORAL at 22:28

## 2019-12-27 RX ADMIN — LACTULOSE 20 GRAM(S): 10 SOLUTION ORAL at 11:13

## 2019-12-27 RX ADMIN — PANTOPRAZOLE SODIUM 40 MILLIGRAM(S): 20 TABLET, DELAYED RELEASE ORAL at 05:42

## 2019-12-27 RX ADMIN — MIDODRINE HYDROCHLORIDE 20 MILLIGRAM(S): 2.5 TABLET ORAL at 05:42

## 2019-12-27 RX ADMIN — CEFTRIAXONE 100 MILLIGRAM(S): 500 INJECTION, POWDER, FOR SOLUTION INTRAMUSCULAR; INTRAVENOUS at 05:42

## 2019-12-27 RX ADMIN — LACTULOSE 20 GRAM(S): 10 SOLUTION ORAL at 17:51

## 2019-12-27 RX ADMIN — Medication 8 UNIT(S): at 17:50

## 2019-12-27 RX ADMIN — INSULIN GLARGINE 28 UNIT(S): 100 INJECTION, SOLUTION SUBCUTANEOUS at 22:28

## 2019-12-27 RX ADMIN — Medication 2: at 17:50

## 2019-12-27 RX ADMIN — MIDODRINE HYDROCHLORIDE 20 MILLIGRAM(S): 2.5 TABLET ORAL at 17:51

## 2019-12-27 NOTE — H&P ADULT - PROBLEM SELECTOR PLAN 8
- DVT ppx: SCD in setting of possible GIB?  - Diet: Dash, low Na diet (salt restricted)  - Transitions of Care Status:  1.  Name of PCP: Dr. Coto  2.  PCP Contacted on Admission: [ ] Y    [ x] N  - night admission  3.  PCP contacted at Discharge: [ ] Y    [ ] N    [ ] N/A  4.  Post-Discharge Appointment Date and Location:  5.  Summary of Handoff given to PCP: - DVT ppx: SCD in setting of possible GIB?  - Diet: NPO for now  - Transitions of Care Status:  1.  Name of PCP: Dr. Coto  2.  PCP Contacted on Admission: [ ] Y    [ x] N  - night admission  3.  PCP contacted at Discharge: [ ] Y    [ ] N    [ ] N/A  4.  Post-Discharge Appointment Date and Location:  5.  Summary of Handoff given to PCP:

## 2019-12-27 NOTE — DISCHARGE NOTE NURSING/CASE MANAGEMENT/SOCIAL WORK - NSDCVIVACCINE_GEN_ALL_CORE_FT
Influenza , 2019/12/17 10:15 , Rajinder Rivera (RN)  Pneumococcal Conjugate (PCV 13) , 2019/12/16 18:28 , Jodie Serrano (RN)

## 2019-12-27 NOTE — H&P ADULT - ASSESSMENT
Mr. Segundo is a 65 y/o male with a PMH of GERD w/ esophagitis, T2DM on insulin, HLD, non-alcoholic cirrhosis w/ hepatic encephalopathy and SBP, HFpEF, GIB, and chronic thrombocytopenia, admitted for recurrent anemia below Hgb of 7 requiring transfusion.

## 2019-12-27 NOTE — CONSULT NOTE ADULT - ASSESSMENT
63 year old male with HTN, DM2, non-alcoholic cirrhosis, who was recently admitted with altered mental status, hyponatremia and high ammonia level. We last saw him during a hospitalization in 10/2019, during which he was significantly volume overloaded and required iv diuresis.  He is currently being evaluated for liver transplant.  He returns yesterday with acute blood loss anemia.    Abnormal EKG  - His EKGs have demonstrated prolonged qtc in the past  - repeat an ekg here  - avoid qtc prolonging medications and replete K to greater than 4 and mg>2     Diastolic HF   - no significant volume overload on exam. CXR is clear  - watch volume with PRBC transfusions  - keep diuretics on hold.   - TTE normal LV systolic function EF 65%; LVH, DD Grade I  - cath with normal filling pressures and no significant cad.    HTN  - BP on softer side  - hold diuretics and anti-hypertensives  - midodrine as needed    - GI follow up  - No cardiac contraindication to proceeding with EGD if necessary. Routine cardiac monitoring is recommended.  - All other workup as per primary team

## 2019-12-27 NOTE — CONSULT NOTE ADULT - SUBJECTIVE AND OBJECTIVE BOX
[facility] - Sushila Dhaliwal, Morenita Camacho, Ayo, Colleen Williamson  Office Number: [phone]    Initial Consult Note    CHIEF COMPLAINT: Patient is a 64y old  Male who presents with a chief complaint of low Hgb (27 Dec 2019 08:05)      HPI:  Mr. Segundo is a 63 y/o male with a PMH of GERD w/ esophagitis, T2DM on insulin, HLD, non-alcoholic cirrhosis w/ hepatic encephalopathy and SBP, HFpEF, GIB, and chronic thrombocytopenia presented to the ED from [facility] rehab for low hemoglobin.    Since falling incidence, patient has been unable to ambulate and has been referred to rehab, where he is now. He was found to be anemic to 6.7 on 12/21, and 1u pRBC was transfused with appropriate response (7.8). Patient visited hepatology office today for further evaluation as liver transplant candidate, where his Hgb was found to be 6.1. Patient remained hemodynamically stable with no active symptoms other than lower left calf pain with movements, yet given recurrent hemoglobin drop, patient was referred to [facility].  Patient remains asymptomatic and denies any headache, CP, palpitation, SOB, lightheadedness, visual disturbance, fever, chills, cough. Denies any melena/hematochezia/hematuria.  He still reports having anteriolateral thigh pain, that is aggravated with movements. Recent CT and US were unrevealing.    He is well known to me, and was evaluated during his recent hospitalization.  He had a cath with non-obstructive cad.  He has no significant chest pain, difficulty breathing or palpitaitons.    PAST MEDICAL & SURGICAL HISTORY:  GIB (gastrointestinal bleeding)  GERD with esophagitis: Gastritis &amp; Non Bleeding Ulcers  Hepatic encephalopathy  Obesity  Fatty liver disease, nonalcoholic  Renal stones: 25 years ago  Hypertension  Neuropathy  Hypercholesteremia  Diabetes  S/P cholecystectomy      SOCIAL HISTORY:  No tobacco, ethanol, or drug abuse.    FAMILY HISTORY:  Family history of type 2 diabetes mellitus  Family history of hypertension  Family history of stomach cancer      MEDICATIONS  (STANDING):  cefTRIAXone   IVPB 1000 milliGRAM(s) IV Intermittent every 24 hours  dextrose 5%. 1000 milliLiter(s) (50 mL/Hr) IV Continuous <Continuous>  dextrose 50% Injectable 12.5 Gram(s) IV Push once  dextrose 50% Injectable 25 Gram(s) IV Push once  dextrose 50% Injectable 25 Gram(s) IV Push once  insulin glargine Injectable (LANTUS) 28 Unit(s) SubCutaneous at bedtime  insulin lispro (HumaLOG) corrective regimen sliding scale   SubCutaneous three times a day before meals  insulin lispro (HumaLOG) corrective regimen sliding scale   SubCutaneous at bedtime  insulin lispro Injectable (HumaLOG) 8 Unit(s) SubCutaneous three times a day before meals  midodrine. 20 milliGRAM(s) Oral three times a day  pantoprazole  Injectable 40 milliGRAM(s) IV Push every 12 hours  rifAXIMin 550 milliGRAM(s) Oral two times a day  sucralfate 1 Gram(s) Oral two times a day  tamsulosin 0.4 milliGRAM(s) Oral at bedtime    MEDICATIONS  (PRN):  dextrose 40% Gel 15 Gram(s) Oral once PRN Blood Glucose LESS THAN 70 milliGRAM(s)/deciliter  glucagon  Injectable 1 milliGRAM(s) IntraMuscular once PRN Glucose LESS THAN 70 milligrams/deciliter  lactulose Syrup 30 Gram(s) Oral every 4 hours PRN Hepatic encephalopathy      Allergies    codeine (Anaphylaxis)  Tomatoes (Unknown)    Intolerances    NO  RED MEAT (Unknown)      REVIEW OF SYSTEMS:    CONSTITUTIONAL: +weakness, no fevers or chills  EYES/ENT: No visual changes;  No vertigo or throat pain   NECK: No pain or stiffness  RESPIRATORY: No cough, wheezing, hemoptysis; No shortness of breath  CARDIOVASCULAR: No chest pain or palpitations  GASTROINTESTINAL: No abdominal pain. No nausea, vomiting, or hematemesis; No diarrhea or constipation. No melena or hematochezia.  GENITOURINARY: No dysuria, frequency or hematuria  NEUROLOGICAL: No numbness or weakness  SKIN: No itching or rash  All other review of systems is negative unless indicated above    VITAL SIGNS:   Vital Signs Last 24 Hrs  T(C): 36.7 (27 Dec 2019 05:03), Max: 37 (26 Dec 2019 22:24)  T(F): 98.1 (27 Dec 2019 05:03), Max: 98.6 (26 Dec 2019 22:24)  HR: 84 (27 Dec 2019 05:03) (82 - 86)  BP: 110/62 (27 Dec 2019 05:03) (107/63 - 130/60)  BP(mean): --  RR: 18 (27 Dec 2019 05:03) (16 - 18)  SpO2: 97% (27 Dec 2019 05:03) (97% - 99%)    I&O's Summary    26 Dec 2019 07:01  -  27 Dec 2019 07:00  --------------------------------------------------------  IN: 170 mL / OUT: 850 mL / NET: -680 mL        On Exam:    Constitutional: NAD, alert and oriented x 3, obese  Lungs:  Non-labored, breath sounds are clear bilaterally, No wheezing, rales or rhonchi  Cardiovascular: RRR.  S1 and S2 positive.  No murmurs, rubs, gallops or clicks  Gastrointestinal: Bowel Sounds present, soft, nontender.   Lymph: No peripheral edema. No cervical lymphadenopathy.  Neurological: Alert, no focal deficits  Skin: No rashes or ulcers   Psych:  Mood & affect appropriate.    LABS: All Labs Reviewed:                        7.1    4.50  )-----------( 79       ( 27 Dec 2019 05:51 )             21.7                         5.6    5.09  )-----------( 79       ( 26 Dec 2019 20:30 )             17.2     27 Dec 2019 05:49    137    |  102    |  18     ----------------------------<  85     3.9     |  25     |  1.17   26 Dec 2019 19:48    134    |  100    |  19     ----------------------------<  171    4.3     |  20     |  1.18     Ca    9.4        27 Dec 2019 05:49  Ca    9.0        26 Dec 2019 19:48  Phos  2.9       27 Dec 2019 05:51  Mg     1.8       27 Dec 2019 05:51    TPro  5.1    /  Alb  2.8    /  TBili  9.7    /  DBili  x      /  AST  33     /  ALT  21     /  AlkPhos  125    27 Dec 2019 05:49  TPro  5.7    /  Alb  3.1    /  TBili  8.9    /  DBili  x      /  AST  42     /  ALT  25     /  AlkPhos  157    26 Dec 2019 19:48    PT/INR - ( 26 Dec 2019 19:48 )   PT: 23.8 sec;   INR: 2.02 ratio         PTT - ( 26 Dec 2019 19:48 )  PTT:39.6 sec      Blood Culture:         RADIOLOGY:    EKG: sr on 12/9

## 2019-12-27 NOTE — DISCHARGE NOTE NURSING/CASE MANAGEMENT/SOCIAL WORK - PATIENT PORTAL LINK FT
You can access the FollowMyHealth Patient Portal offered by [facility] by registering at the following website: [email or web address]. By joining [employer]’s FollowMyHealth portal, you will also be able to view your health information using other applications (apps) compatible with our system.

## 2019-12-27 NOTE — H&P ADULT - NSHPSOCIALHISTORY_GEN_ALL_CORE
coming from rehab.  Denies tobacco use, denies recent etoh use (previous etoh use was >2 years ago, denies hx of etoh abuse), denies drug abuse  ambulates with cane/walker occasionally, yet increasingly difficult to ambulate.

## 2019-12-27 NOTE — CONSULT NOTE ADULT - SUBJECTIVE AND OBJECTIVE BOX
Chief Complaint:  Patient is a 64y old  Male who presents with a chief complaint of low Hgb (27 Dec 2019 02:58)      HPI:  The patient is a is a 63 y/o male with a PMH of GERD w/ esophagitis, T2DM on insulin, HLD, JEAN cirrhosis listed, c/b hepatic encephalopathy and SBP, HFpEF, GIB, and chronic thrombocytopenia who presented to the ED from [facility] rehab for low hemoglobin.    Since falling incidence, patient has been unable to ambulate and has been referred to rehab, where he is now. He was found to be anemic to 6.7 on , and 1u pRBC was transfused with appropriate response (7.8). Patient visited hepatology office today for further evaluation as liver transplant candidate, where his Hgb was found to be 6.1. Patient remained hemodynamically stable with no active symptoms other than lower left calf pain with movement but he was referred to [facility].  On arrival the patient was asymptomatic     In ED, temp 98.2, HR 80, /63, RR 18, SpO2 98% on RA. IV protonix 80mg x1. Hemoglobin noted to be 5.7 he received two units of PRBCs and repeat hemoglobin was 7.1      Allergies:  codeine (Anaphylaxis)  NO  RED MEAT (Unknown)  Tomatoes (Unknown)      Home Medications:    Hospital Medications:  cefTRIAXone   IVPB 1000 milliGRAM(s) IV Intermittent every 24 hours  dextrose 40% Gel 15 Gram(s) Oral once PRN  dextrose 5%. 1000 milliLiter(s) IV Continuous <Continuous>  dextrose 50% Injectable 12.5 Gram(s) IV Push once  dextrose 50% Injectable 25 Gram(s) IV Push once  dextrose 50% Injectable 25 Gram(s) IV Push once  glucagon  Injectable 1 milliGRAM(s) IntraMuscular once PRN  insulin glargine Injectable (LANTUS) 28 Unit(s) SubCutaneous at bedtime  insulin lispro (HumaLOG) corrective regimen sliding scale   SubCutaneous three times a day before meals  insulin lispro (HumaLOG) corrective regimen sliding scale   SubCutaneous at bedtime  insulin lispro Injectable (HumaLOG) 8 Unit(s) SubCutaneous three times a day before meals  lactulose Syrup 30 Gram(s) Oral every 4 hours PRN  midodrine. 20 milliGRAM(s) Oral three times a day  pantoprazole  Injectable 40 milliGRAM(s) IV Push every 12 hours  rifAXIMin 550 milliGRAM(s) Oral two times a day  sucralfate 1 Gram(s) Oral two times a day  tamsulosin 0.4 milliGRAM(s) Oral at bedtime      PMHX/PSHX:  GIB (gastrointestinal bleeding)  GERD with esophagitis  Hepatic encephalopathy  Obesity  Fatty liver disease, nonalcoholic  Renal stones  Hypertension  Neuropathy  Hypercholesteremia  Diabetes  S/P cholecystectomy  No significant past surgical history      Family history:  Family history of type 2 diabetes mellitus  Family history of hypertension  Family history of stomach cancer  No pertinent family history in first degree relatives      Social History:     ROS:     General:  No wt loss, fevers, chills, night sweats, fatigue,   Eyes:  Good vision, no reported pain  ENT:  No sore throat, pain, runny nose, dysphagia  CV:  No pain, palpitations, hypo/hypertension  Resp:  No dyspnea, cough, tachypnea, wheezing  GI:  See HPI  :  No pain, bleeding, incontinence, nocturia  Muscle:  No pain, weakness  Neuro:  No weakness, tingling, memory problems  Psych:  No fatigue, insomnia, mood problems, depression  Endocrine:  No polyuria, polydipsia, cold/heat intolerance  Heme:  No petechiae, ecchymosis, easy bruisability  Skin:  No rash, edema      PHYSICAL EXAM:     GENERAL:  NAD  CHEST:  Full & symmetric excursion  HEART:  Regular rhythm, no abdominal bruit, no edema  ABDOMEN:  Soft, non-tender, non-distended, normoactive bowel sounds,  no masses , no hepatosplenomegaly  EXTREMITIES:  no cyanosis,clubbing or edema  SKIN:  No rash/erythema/ecchymoses/petechiae/wounds/abscess/warm/dry  NEURO:  Alert, oriented    Vital Signs:  Vital Signs Last 24 Hrs  T(C): 36.7 (27 Dec 2019 05:03), Max: 37 (26 Dec 2019 22:24)  T(F): 98.1 (27 Dec 2019 05:03), Max: 98.6 (26 Dec 2019 22:24)  HR: 84 (27 Dec 2019 05:03) (82 - 86)  BP: 110/62 (27 Dec 2019 05:03) (107/63 - 130/60)  BP(mean): --  RR: 18 (27 Dec 2019 05:03) (16 - 18)  SpO2: 97% (27 Dec 2019 05:03) (97% - 99%)  Daily Height in cm: 185.42 (26 Dec 2019 17:12)    Daily Weight in k.1 (27 Dec 2019 08:01)    LABS:                        7.1    4.50  )-----------( 79       ( 27 Dec 2019 05:51 )             21.7         137  |  102  |  18  ----------------------------<  85  3.9   |  25  |  1.17    Ca    9.4      27 Dec 2019 05:49  Phos  2.9       Mg     1.8         TPro  5.1<L>  /  Alb  2.8<L>  /  TBili  9.7<H>  /  DBili  x   /  AST  33  /  ALT  21  /  AlkPhos  125<H>      LIVER FUNCTIONS - ( 27 Dec 2019 05:49 )  Alb: 2.8 g/dL / Pro: 5.1 g/dL / ALK PHOS: 125 U/L / ALT: 21 U/L / AST: 33 U/L / GGT: x           PT/INR - ( 26 Dec 2019 19:48 )   PT: 23.8 sec;   INR: 2.02 ratio         PTT - ( 26 Dec 2019 19:48 )  PTT:39.6 sec        Imaging: Chief Complaint:  Patient is a 64y old  Male who presents with a chief complaint of low Hgb (27 Dec 2019 02:58)      HPI:  The patient is a is a 65 y/o male with a PMH of GERD w/ esophagitis, T2DM on insulin, HLD, JEAN cirrhosis listed, c/b hepatic encephalopathy and SBP, HFpEF, GIB, and chronic thrombocytopenia who presented to the ED from [facility] rehab for low hemoglobin.    Since falling incidence, patient has been unable to ambulate and has been referred to rehab, where he is now. He was found to be anemic to 6.7 on , and 1u pRBC was transfused with appropriate response (7.8). Patient visited hepatology office today for further evaluation as liver transplant candidate, where his Hgb was found to be 6.1. Patient remained hemodynamically stable with no active symptoms other than lower left calf pain with movement but he was referred to [facility].  On arrival the patient was asymptomatic and denies melena or BRBPR.    In ED, temp 98.2, HR 80, /63, RR 18, SpO2 98% on RA. IV protonix 80mg x1. Hemoglobin noted to be 5.7 he received two units of PRBCs and repeat hemoglobin was 7.1      Allergies:  codeine (Anaphylaxis)  NO  RED MEAT (Unknown)  Tomatoes (Unknown)      Home Medications:    Hospital Medications:  cefTRIAXone   IVPB 1000 milliGRAM(s) IV Intermittent every 24 hours  dextrose 40% Gel 15 Gram(s) Oral once PRN  dextrose 5%. 1000 milliLiter(s) IV Continuous <Continuous>  dextrose 50% Injectable 12.5 Gram(s) IV Push once  dextrose 50% Injectable 25 Gram(s) IV Push once  dextrose 50% Injectable 25 Gram(s) IV Push once  glucagon  Injectable 1 milliGRAM(s) IntraMuscular once PRN  insulin glargine Injectable (LANTUS) 28 Unit(s) SubCutaneous at bedtime  insulin lispro (HumaLOG) corrective regimen sliding scale   SubCutaneous three times a day before meals  insulin lispro (HumaLOG) corrective regimen sliding scale   SubCutaneous at bedtime  insulin lispro Injectable (HumaLOG) 8 Unit(s) SubCutaneous three times a day before meals  lactulose Syrup 30 Gram(s) Oral every 4 hours PRN  midodrine. 20 milliGRAM(s) Oral three times a day  pantoprazole  Injectable 40 milliGRAM(s) IV Push every 12 hours  rifAXIMin 550 milliGRAM(s) Oral two times a day  sucralfate 1 Gram(s) Oral two times a day  tamsulosin 0.4 milliGRAM(s) Oral at bedtime      PMHX/PSHX:  GIB (gastrointestinal bleeding)  GERD with esophagitis  Hepatic encephalopathy  Obesity  Fatty liver disease, nonalcoholic  Renal stones  Hypertension  Neuropathy  Hypercholesteremia  Diabetes  S/P cholecystectomy  No significant past surgical history      Family history:  Family history of type 2 diabetes mellitus  Family history of hypertension  Family history of stomach cancer  No pertinent family history in first degree relatives      Social History:     ROS:     General:  No wt loss, fevers, chills, night sweats, fatigue,   Eyes:  Good vision, no reported pain  ENT:  No sore throat, pain, runny nose, dysphagia  CV:  No pain, palpitations, hypo/hypertension  Resp:  No dyspnea, cough, tachypnea, wheezing  GI:  See HPI  :  No pain, bleeding, incontinence, nocturia  Muscle:  No pain, weakness  Neuro:  No weakness, tingling, memory problems  Psych:  No fatigue, insomnia, mood problems, depression  Endocrine:  No polyuria, polydipsia, cold/heat intolerance  Heme:  No petechiae, ecchymosis, easy bruisability  Skin:  No rash, edema      PHYSICAL EXAM:     GENERAL:  NAD  CHEST:  Full & symmetric excursion  HEART:  Regular rhythm, no abdominal bruit, no edema  ABDOMEN:  Soft, non-tender, non-distended, normoactive bowel sounds,  no masses , no hepatosplenomegaly  EXTREMITIES:  no cyanosis,clubbing or edema  SKIN:  No rash/erythema/ecchymoses/petechiae/wounds/abscess/warm/dry  NEURO:  grade 2 PSE, +asterixis  Rectal: light brown stool    Vital Signs:  Vital Signs Last 24 Hrs  T(C): 36.7 (27 Dec 2019 05:03), Max: 37 (26 Dec 2019 22:24)  T(F): 98.1 (27 Dec 2019 05:03), Max: 98.6 (26 Dec 2019 22:24)  HR: 84 (27 Dec 2019 05:03) (82 - 86)  BP: 110/62 (27 Dec 2019 05:03) (107/63 - 130/60)  BP(mean): --  RR: 18 (27 Dec 2019 05:03) (16 - 18)  SpO2: 97% (27 Dec 2019 05:03) (97% - 99%)  Daily Height in cm: 185.42 (26 Dec 2019 17:12)    Daily Weight in k.1 (27 Dec 2019 08:01)    LABS:                        7.1    4.50  )-----------( 79       ( 27 Dec 2019 05:51 )             21.7         137  |  102  |  18  ----------------------------<  85  3.9   |  25  |  1.17    Ca    9.4      27 Dec 2019 05:49  Phos  2.9       Mg     1.8         TPro  5.1<L>  /  Alb  2.8<L>  /  TBili  9.7<H>  /  DBili  x   /  AST  33  /  ALT  21  /  AlkPhos  125<H>      LIVER FUNCTIONS - ( 27 Dec 2019 05:49 )  Alb: 2.8 g/dL / Pro: 5.1 g/dL / ALK PHOS: 125 U/L / ALT: 21 U/L / AST: 33 U/L / GGT: x           PT/INR - ( 26 Dec 2019 19:48 )   PT: 23.8 sec;   INR: 2.02 ratio         PTT - ( 26 Dec 2019 19:48 )  PTT:39.6 sec        Imaging:

## 2019-12-27 NOTE — CONSULT NOTE ADULT - ASSESSMENT
anemia  cirrhosis    no overt gi bleed  hepatology involved  plan for  upper gastrointestinal endoscopy with hepatology on monday  diet as tolerated  proton pump inhibitor bid  rocephin for sbp prophylaxis  lactulose/xifaxin for history of HE  d/w  patient

## 2019-12-27 NOTE — H&P ADULT - HISTORY OF PRESENT ILLNESS
Mr. Segundo is a 63 y/o male with a PMH of GERD w/ esophagitis, T2DM on insulin, HLD, non-alcoholic cirrhosis w/ hepatic encephalopathy and SBP, HFpEF, GIB, and chronic thrombocytopenia presented to the ED from [facility] rehab for low hemoglobin.    Since falling incidence, patient has been unable to ambulate and has been referred to rehab, where he is now. He was found to be anemic to 6.7 on 12/21, and 1u pRBC was transfused with appropriate response (7.8). Patient visited hepatology office today for further evaluation as liver transplant candidate, where his Hgb was found to be 6.1. Patient remained hemodynamically stable with no active symptoms other than lower left calf pain with movements, yet given recurrent hemoglobin drop, patient was referred to [facility]. Mr. Segundo is a 63 y/o male with a PMH of GERD w/ esophagitis, T2DM on insulin, HLD, non-alcoholic cirrhosis w/ hepatic encephalopathy and SBP, HFpEF, GIB, and chronic thrombocytopenia presented to the ED from [facility] rehab for low hemoglobin.    Since falling incidence, patient has been unable to ambulate and has been referred to rehab, where he is now. He was found to be anemic to 6.7 on 12/21, and 1u pRBC was transfused with appropriate response (7.8). Patient visited hepatology office today for further evaluation as liver transplant candidate, where his Hgb was found to be 6.1. Patient remained hemodynamically stable with no active symptoms other than lower left calf pain with movements, yet given recurrent hemoglobin drop, patient was referred to [facility].  Patient remains asymptomatic and denies any headache, CP, palpitation, SOB, lightheadedness, visual disturbance, fever, chills, cough. Denies any melena/hematochezia/hematuria.  He still reports having anteriolateral thigh pain, that is aggravated with movements. Recent CT and US were unrevealing.    In ED, temp 98.2, HR 80, /63, RR 18, SpO2 98% on RA.  IV protonix 80mg x1.

## 2019-12-27 NOTE — H&P ADULT - NSHPREVIEWOFSYSTEMS_GEN_ALL_CORE
CONSTITUTIONAL: No fever, weight loss, or fatigue  EYES: No eye pain, visual disturbances, or discharge  ENMT:  No difficulty hearing, tinnitus, vertigo; No sinus or throat pain  RESPIRATORY: No cough, wheezing, chills or hemoptysis; No shortness of breath  CARDIOVASCULAR: No chest pain, palpitations, dizziness, or leg swelling  GASTROINTESTINAL: No abdominal or epigastric pain. No nausea, vomiting, or hematemesis; No diarrhea or constipation. No melena or hematochezia.  GENITOURINARY: No dysuria, frequency, hematuria, or incontinence  NEUROLOGICAL: No headaches, loss of strength, numbness, or tremors  SKIN: No itching, burning, rashes, or lesions   LYMPH NODES: No enlarged glands  ENDOCRINE: No heat or cold intolerance; No polydipsia or polyuria  MUSCULOSKELETAL: thigh pain.  PSYCHIATRIC: Denies depression, anxiety  HEME/LYMPH: No easy bruising, or bleeding gums  ALLERGY AND IMMUNOLOGIC: No hives or eczema

## 2019-12-27 NOTE — H&P ADULT - PROBLEM SELECTOR PLAN 1
Cirrhotic patient with recurrent Hgb drop. Admitted for further evaluation.  - patient denies melena or hematochezia, FOBT negative, less concerning for active GIB.  - currently running 2u pRBC transfusion for Hgb less than 7. Will follow up with post transfusion CBC.  - c/w home dose PPI  - NPO for now.  - GI consult in AM for possible endoscopy/colonoscopy evaluation. Cirrhotic patient with recurrent Hgb drop. Admitted for further evaluation.  - patient denies melena or hematochezia, FOBT negative, less concerning for active GIB.  - currently running 2u pRBC transfusion for Hgb less than 7. Will follow up with post transfusion CBC.  - c/w home dose PPI  - NPO for now.  - GI consulted Cirrhotic patient with recurrent Hgb drop. Admitted for further evaluation.  - patient denies melena or hematochezia, FOBT negative, less concerning for active GIB.  - currently running 2u pRBC transfusion for Hgb less than 7. Will follow up with post transfusion CBC.  - will start CTX for SBP ppx assuming ascites and GIB.   - c/w home dose PPI  - NPO for now.  - GI consulted Cirrhotic patient with recurrent Hgb drop. Admitted for further evaluation.  - patient denies melena or hematochezia, FOBT negative, less concerning for active GIB. However, has hx of prior GAVE s/p apc and recurrent duodenal ulcer.  - currently running 2u pRBC transfusion for Hgb less than 7. Will follow up with post transfusion CBC.  - will start CTX for SBP ppx assuming ascites and GIB. Last paracentesis for ascites reportedly in 10/2019 at [facility]. Currently no significant ttp on exam and no worsening fluid wave/distention.  - c/w home dose PPI  - NPO for now.  - GI consulted  -check CTAP to assess for occult bleeding, splenomegaly, ascites  Pt with ongoing L leg pain but CT and US of LLE done as o/p today both nondx for hematoma.

## 2019-12-27 NOTE — H&P ADULT - PROBLEM SELECTOR PLAN 2
Recurrent Anemia requiring transfusions.  - GIB workup as above.   - 12/22 Retic percentage - 4.1: Retic index = 0.77 concerning for hypoproliferation.  - B12 and folate normal in Dec 6 workup.   - Iron studies c/w anemia of chronic disease.  - haptoglobin was low yet other hemolysis labs wnl in the past.   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   - will order repeat hemolysis labs.  - consider CT A/P if concerned for retroperitoneal bleeding.  - heme/onc consult in AM  - hepatology consult in AM. Recurrent Anemia requiring transfusions.  - GIB workup as above.   - 12/22 Retic percentage - 4.1: Retic index = 0.77 concerning for hypoproliferation.  - B12 and folate normal in Dec 6 workup.   - Iron studies c/w anemia of chronic disease.  - haptoglobin was low yet other hemolysis labs wnl in the past.   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   - will order repeat hemolysis labs.  - CT A/P if concerned for retroperitoneal bleeding.  - heme/onc consult in AM  - hepatology consult in AM. Recurrent Anemia requiring transfusions.  - GIB workup as above.   - 12/22 Retic percentage - 4.1: Retic index = 0.77 concerning for hypoproliferation.  - B12 and folate normal in Dec 6 workup.   - Iron studies c/w anemia of chronic disease.  - haptoglobin was low yet other hemolysis labs wnl in the past.   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   - will order repeat hemolysis labs.  - CT A/P to r/o other sources of bleeding.  - heme/onc consult in AM  - hepatology consulted

## 2019-12-27 NOTE — H&P ADULT - NSHPLABSRESULTS_GEN_ALL_CORE
5.6    5.09  )-----------( 79       ( 26 Dec 2019 20:30 )             17.2       12-26    134<L>  |  100  |  19  ----------------------------<  171<H>  4.3   |  20<L>  |  1.18    Ca    9.0      26 Dec 2019 19:48    TPro  5.7<L>  /  Alb  3.1<L>  /  TBili  8.9<H>  /  DBili  x   /  AST  42<H>  /  ALT  25  /  AlkPhos  157<H>  12-26                  PT/INR - ( 26 Dec 2019 19:48 )   PT: 23.8 sec;   INR: 2.02 ratio         PTT - ( 26 Dec 2019 19:48 )  PTT:39.6 sec    Lactate Trend            CAPILLARY BLOOD GLUCOSE            Culture Results:   >100,000 CFU/ml Escherichia coli ESBL  >100,000 CFU/ml Klebsiella pneumoniae (12-10 @ 23:06)  Culture Results:   >100,000 CFU/ml Escherichia coli ESBL  >100,000 CFU/ml Klebsiella pneumoniae (12-06 @ 10:30)  Culture Results:   No growth at 5 days. (11-27 @ 14:48)  Culture Results:   No growth at 5 days. (11-27 @ 14:48)      RADIOLOGY, EKG & ADDITIONAL TESTS: Reviewed. 5.6    5.09  )-----------( 79       ( 26 Dec 2019 20:30 )             17.2       12-26    134<L>  |  100  |  19  ----------------------------<  171<H>  4.3   |  20<L>  |  1.18    Ca    9.0      26 Dec 2019 19:48    TPro  5.7<L>  /  Alb  3.1<L>  /  TBili  8.9<H>  /  DBili  x   /  AST  42<H>  /  ALT  25  /  AlkPhos  157<H>  12-26                  PT/INR - ( 26 Dec 2019 19:48 )   PT: 23.8 sec;   INR: 2.02 ratio         PTT - ( 26 Dec 2019 19:48 )  PTT:39.6 sec    Lactate Trend            CAPILLARY BLOOD GLUCOSE            Culture Results:   >100,000 CFU/ml Escherichia coli ESBL  >100,000 CFU/ml Klebsiella pneumoniae (12-10 @ 23:06)  Culture Results:   >100,000 CFU/ml Escherichia coli ESBL  >100,000 CFU/ml Klebsiella pneumoniae (12-06 @ 10:30)  Culture Results:   No growth at 5 days. (11-27 @ 14:48)  Culture Results:   No growth at 5 days. (11-27 @ 14:48)      RADIOLOGY, EKG & ADDITIONAL TESTS: personally Reviewed.

## 2019-12-27 NOTE — CONSULT NOTE ADULT - SUBJECTIVE AND OBJECTIVE BOX
[facility]  Ramón Ortega PA-C  [address] [place] [id number]  [phone]      Chief Complaint:  Patient is a 64y old  Male who presents with a chief complaint of low Hgb (27 Dec 2019 08:30)      HPI: 64M, well known to our group, who was recently admitted with worsening liver disease, listed for transplant on last admission, now presents from rehab with anemia  patient denies any melena or hematochezia and states " i check my stool every time"  he is occult negative  last endoscopy 19 showed esophagitis, portal htn gastropathy and duodenal ulcer  last colonoscopy 2018, 2 3mm polyps removed      Allergies:  codeine (Anaphylaxis)  NO  RED MEAT (Unknown)  Tomatoes (Unknown)      Medications:  cefTRIAXone   IVPB 1000 milliGRAM(s) IV Intermittent every 24 hours  dextrose 40% Gel 15 Gram(s) Oral once PRN  dextrose 5%. 1000 milliLiter(s) IV Continuous <Continuous>  dextrose 50% Injectable 12.5 Gram(s) IV Push once  dextrose 50% Injectable 25 Gram(s) IV Push once  dextrose 50% Injectable 25 Gram(s) IV Push once  glucagon  Injectable 1 milliGRAM(s) IntraMuscular once PRN  insulin glargine Injectable (LANTUS) 28 Unit(s) SubCutaneous at bedtime  insulin lispro (HumaLOG) corrective regimen sliding scale   SubCutaneous three times a day before meals  insulin lispro (HumaLOG) corrective regimen sliding scale   SubCutaneous at bedtime  insulin lispro Injectable (HumaLOG) 8 Unit(s) SubCutaneous three times a day before meals  lactulose Syrup 20 Gram(s) Oral every 6 hours  midodrine. 20 milliGRAM(s) Oral three times a day  pantoprazole Infusion 8 mG/Hr IV Continuous <Continuous>  rifAXIMin 550 milliGRAM(s) Oral two times a day  sucralfate 1 Gram(s) Oral two times a day  tamsulosin 0.4 milliGRAM(s) Oral at bedtime      PMHX/PSHX:  GIB (gastrointestinal bleeding)  GERD with esophagitis  Hepatic encephalopathy  Obesity  Fatty liver disease, nonalcoholic  Renal stones  Hypertension  Neuropathy  Hypercholesteremia  Diabetes  S/P cholecystectomy  No significant past surgical history      Family history:  Family history of type 2 diabetes mellitus  Family history of hypertension  Family history of stomach cancer  No pertinent family history in first degree relatives      Social History:     ROS:     General:  No wt loss, fevers, chills, night sweats, + fatigue,   Eyes:  Good vision, no reported pain  ENT:  No sore throat, pain, runny nose, dysphagia  CV:  No pain, palpitations, hypo/hypertension  Resp:  No dyspnea, cough, tachypnea, wheezing  GI:  No pain, No nausea, No vomiting, No diarrhea, No constipation, No weight loss, No fever, No pruritis, No rectal bleeding, No tarry stools, No dysphagia,  :  No pain, bleeding, incontinence, nocturia  Muscle:  No pain, weakness  Neuro:  No weakness, tingling, memory problems  Psych:  No fatigue, insomnia, mood problems, depression  Endocrine:  No polyuria, polydipsia, cold/heat intolerance  Heme:  No petechiae, ecchymosis, easy bruisability  Skin:  No rash, tattoos, scars, edema      PHYSICAL EXAM:   Vital Signs:  Vital Signs Last 24 Hrs  T(C): 36.7 (27 Dec 2019 13:00), Max: 37 (26 Dec 2019 22:24)  T(F): 98.1 (27 Dec 2019 13:00), Max: 98.6 (26 Dec 2019 22:24)  HR: 78 (27 Dec 2019 13:00) (78 - 86)  BP: 121/67 (27 Dec 2019 13:00) (107/63 - 130/60)  BP(mean): --  RR: 18 (27 Dec 2019 13:00) (16 - 18)  SpO2: 97% (27 Dec 2019 13:00) (97% - 99%)  Daily Height in cm: 185.42 (26 Dec 2019 17:12)    Daily Weight in k.1 (27 Dec 2019 08:01)    GENERAL:  Appears stated age, well-groomed, well-nourished, no distress  HEENT:  NC/AT,  conjunctivae clear and pink, no thyromegaly, nodules, adenopathy, no JVD, sclera -anicteric  CHEST:  Full & symmetric excursion, no increased effort, breath sounds clear  HEART:  Regular rhythm, S1, S2, no murmur/rub/S3/S4, no abdominal bruit, no edema  ABDOMEN:  Soft, non-tender, non-distended, normoactive bowel sounds,  no masses ,no hepato-splenomegaly, no signs of chronic liver disease  EXTEREMITIES:  no cyanosis,clubbing or edema  SKIN:  No rash/erythema/ecchymoses/petechiae/wounds/abscess/warm/dry  NEURO:  Alert, oriented, no asterixis, no tremor, no encephalopathy    LABS:                        7.8    5.03  )-----------( 83       ( 27 Dec 2019 14:47 )             23.2         137  |  102  |  18  ----------------------------<  85  3.9   |  25  |  1.17    Ca    9.4      27 Dec 2019 05:49  Phos  2.9       Mg     1.8         TPro  5.1<L>  /  Alb  2.8<L>  /  TBili  9.7<H>  /  DBili  x   /  AST  33  /  ALT  21  /  AlkPhos  125<H>      LIVER FUNCTIONS - ( 27 Dec 2019 05:49 )  Alb: 2.8 g/dL / Pro: 5.1 g/dL / ALK PHOS: 125 U/L / ALT: 21 U/L / AST: 33 U/L / GGT: x           PT/INR - ( 26 Dec 2019 19:48 )   PT: 23.8 sec;   INR: 2.02 ratio         PTT - ( 26 Dec 2019 19:48 )  PTT:39.6 sec    Amylase Serum--      Lipase serum--       [id number]      Imaging:

## 2019-12-27 NOTE — H&P ADULT - ATTENDING COMMENTS
Pt seen and examined at bedside.  I have precepted this case with house staff and agree with resident note above and have edited it where appropriate.  63 y/o male with a PMH of GERD w/ esophagitis, T2DM on insulin, HLD, non-alcoholic cirrhosis w/ hepatic encephalopathy and SBP, HFpEF, GIB, and chronic thrombocytopenia, admitted for recurrent anemia below Hgb of 7 requiring transfusion.  Has had refractory downtrending anemia which prior iron studies suggest were in setting of AOCD, but not the rapidity with which his hgb has dropped despite o/p prbc txf suggests either occult blood loss or hemolysis in ddx. occult neg, hd stable, no reported melena/brbpr/hematemesis. given prior UGIB bleeding sources (GAVE s/p apc and  duodenal ulcers), will need GI consult for endoscopic eval. f/u pending hemolysis labs. trend cbc post tfx. Liver team eval in am, diuretics per their recommendations. Hematology eval. f/u Ct chest.  started empiric ceftriaxone for UGIB and sbp prophylaxis in setting of cirrhosis. c/w ppi bid. no varices on recent egd, hold off on octreotide for now.   Care to be assumed by day hospitalist at 8 am.  This patient was assigned to me by the hospitalist in charge; my involvement in this case has consisted of the initial history, physical, chart review, and management plan.  This patient was previously unknown to me.

## 2019-12-27 NOTE — H&P ADULT - PROBLEM SELECTOR PLAN 3
Cirrhosis 2/2 JEAN c/b hepatic encephalopathy and SBP in the past.  - On transplant list  - last EUS on 5/3/2019 w/o esophageal varices.  - hepatology consult in AM  - daily labs for MELD-Na score  - MELD-Na score on admission: 27.  - c/w lactulose and rifaximin.  - Strict I/O, daily weight.  - hypervolemic on exam: consider diuretics after fluid status assessment. Cirrhosis 2/2 JEAN c/b hepatic encephalopathy and SBP in the past.  - On transplant list  - last EUS on 5/3/2019 w/o esophageal varices.  - hepatology consult in AM  - daily labs for MELD-Na score  - MELD-Na score on admission: 27.  - c/w lactulose and rifaximin.  - Strict I/O, daily weight.  - hypervolemic on exam: consider diuretics after fluid status assessment post transfusion.

## 2019-12-27 NOTE — H&P ADULT - NSHPPHYSICALEXAM_GEN_ALL_CORE
Vital Signs Last 24 Hrs  T(C): 36.8 (26 Dec 2019 23:50), Max: 37 (26 Dec 2019 22:24)  T(F): 98.2 (26 Dec 2019 23:50), Max: 98.6 (26 Dec 2019 22:24)  HR: 82 (26 Dec 2019 23:50) (82 - 86)  BP: 111/63 (26 Dec 2019 23:50) (107/63 - 130/60)  BP(mean): --  RR: 18 (26 Dec 2019 23:50) (16 - 18)  SpO2: 98% (26 Dec 2019 23:50) (98% - 99%)    PHYSICAL EXAM:    Constitutional: NAD. obese male lying comfortably.  HEENT: AT/NC, EOMI, no scleral icterus, MMM, supple neck.  Respiratory: CTAB anteriorly. equal aeration bilaterally. no wheezing, no crackes, no rhonchi. no increase in WOB  Cardiovascular: RRR, systolic murmur best heard on RUSB/LUSB. 2+ distal pulses. Cap refill ~3 seconds.   Gastrointestinal: soft; NT/ND, +BS  Extremities: no cyanosis; 2+ pitting edema bilaterally. mildly tender to palpation on left anterior thigh, without skin changes overlying.   Neurological: A&Ox 3; responds to pain; responds to verbal commands; CN grossly intact. moves all four extremities against gravity.   Psychiatric: normal mood/affect Vital Signs Last 24 Hrs  T(C): 36.8 (26 Dec 2019 23:50), Max: 37 (26 Dec 2019 22:24)  T(F): 98.2 (26 Dec 2019 23:50), Max: 98.6 (26 Dec 2019 22:24)  HR: 82 (26 Dec 2019 23:50) (82 - 86)  BP: 111/63 (26 Dec 2019 23:50) (107/63 - 130/60)  BP(mean): --  RR: 18 (26 Dec 2019 23:50) (16 - 18)  SpO2: 98% (26 Dec 2019 23:50) (98% - 99%)    PHYSICAL EXAM:    Constitutional: NAD. obese male lying comfortably.  HEENT: AT/NC, EOMI, no scleral icterus, MMM,   NECK: supple neck. no jvd  LUNG: CTAB anteriorly. equal aeration bilaterally. no wheezing, no crackes, no rhonchi. no increase in WOB  HEART: RRR, systolic murmur best heard on RUSB/LUSB. 2+ distal pulses. Cap refill ~3 seconds.   ABDOMEN: soft; NT/ND, +BS  Extremities: no cyanosis; 2+ pitting edema bilaterally. mildly tender to palpation on left anterior thigh, without skin changes overlying.   Neurological: A&Ox 3; responds to pain; responds to verbal commands; CN grossly intact. moves all four extremities against gravity.   Psychiatric: normal mood/affect  SKIN: no petechiae, palmar erythema

## 2019-12-27 NOTE — CONSULT NOTE ADULT - ASSESSMENT
Impression:  1)Cirrhosis - JEAN, listed for transplant           MELD-Na: 26           EV: none reported           Ascites: trace           HE: past           HCC: none  2) Anemia - normocytic with out signs of overt bleeding, duodenal ulcer in past.      Recommendations:   - monitor hemoglobin   - transfuse as needed   - two active IVs at all times   - active type and screen   - send iron studies including retic count, LDH, haptoglobin   - IV PPI gtt   - ceftriaxone daily for SBP ppx   - keep NPO Impression:  1)Cirrhosis - JEAN, listed for transplant           MELD-Na: 26           EV: none reported           Ascites: trace           HE: past           HCC: none  2) Anemia - normocytic with out signs of overt bleeding, duodenal ulcer in past.      Recommendations:   - monitor hemoglobin   - transfuse as needed   - two active IVs at all times   - active type and screen   - send iron studies including retic count, LDH, haptoglobin   - IV PPI gtt   - ceftriaxone daily for SBP ppx   - keep on clears for today, can advanced diet if hemoglobin stable   - start lactulose 30 mL Q6h Impression:  1)Cirrhosis - JEAN, listed for transplant           MELD-Na: 25           EV: none reported           Ascites: trace           HE: grade 2           HCC: none  2) Anemia - normocytic with out signs of overt bleeding, duodenal ulcer in past.      Recommendations:   - monitor hemoglobin   - transfuse as needed   - two active IVs at all times   - active type and screen   - send iron studies including retic count, LDH, haptoglobin   - IV PPI gtt   - ceftriaxone daily for SBP ppx   - okay for regular diet   - start lactulose 30 mL Q6h  - continue rifaximin  - tentative plan for EGD on Monday if HE resolved

## 2019-12-27 NOTE — CONSULT NOTE ADULT - ATTENDING COMMENTS
63 yo M with IDDM, dyslipidemia, obesity, GERD, and decompensated JEAN cirrhosis complicated by ascites, history of SBP, hepatic encephalopathy, and chronic anemia with a history of duodenal ulcer as well as GAVE and duodenal AVM s/p APC (last on 10/11/19), who is UNOS listed for liver transplantation at [facility], blood type A, with current MELD-Na 25. He was re-admitted from subacute rehab due to acute on chronic anemia to Hb 5.6 (decreased from 7.6 on 12/22), now s/p 2 units PRBCs transfused with repeat Hb 7.1 today. He has not had any overt GI bleeding. He has grade 2 encephalopathy today. Liver tests stable compared to his baseline.    # Acute on chronic anemia: Likely secondary to chronic occult blood loss from known GAVE and AVMs as well as possible PHG, but may also have spur cell anemia related to his end-stage liver disease (had macrocytosis on initial labs when Hb was 5.6). Hemodynamically stable. No suspicion for variceal hemorrhage, and did not have varices on his last EGD on 10/11/19. Continue PPI q12h and ceftriaxone, but can discontinue sucralfate and resume diet. Trend Hb/HCT daily and transfuse as needed to keep Hb >7; avoid overtransfusion. Follow-up repeat iron studies. Will tentatively plan for EGD on Monday once HE resolved.    # Hepatic encephalopathy: Continue rifaximin and increase lactulose to 30g po q6h atc until mental status has returned to his baseline, then can titrate as needed to maintain 3-4 BMs/day. Please send UA, urine culture, blood cultures, and perform US to assess for ascites, as infection is a frequent trigger of HE and he has a past history of SBP. Will need diagnostic paracentesis if sufficient fluid seen on US, but does not appear to have large volume ascites based on exam.    # Decompensated JEAN cirrhosis: Listed for liver transplant with MELD-Na 25 (12/27/19), blood type A.    Please don't hesitate to call with any questions/concerns.    Letitia Mo M.D., Ph.D.  Transplant Hepatology  Cell: [phone]

## 2019-12-28 LAB
ALBUMIN SERPL ELPH-MCNC: 2.6 G/DL — LOW (ref 3.3–5)
ALP SERPL-CCNC: 147 U/L — HIGH (ref 40–120)
ALT FLD-CCNC: 18 U/L — SIGNIFICANT CHANGE UP (ref 10–45)
ANION GAP SERPL CALC-SCNC: 14 MMOL/L — SIGNIFICANT CHANGE UP (ref 5–17)
AST SERPL-CCNC: 33 U/L — SIGNIFICANT CHANGE UP (ref 10–40)
BILIRUB SERPL-MCNC: 10.2 MG/DL — HIGH (ref 0.2–1.2)
BUN SERPL-MCNC: 18 MG/DL — SIGNIFICANT CHANGE UP (ref 7–23)
CALCIUM SERPL-MCNC: 9.2 MG/DL — SIGNIFICANT CHANGE UP (ref 8.4–10.5)
CHLORIDE SERPL-SCNC: 103 MMOL/L — SIGNIFICANT CHANGE UP (ref 96–108)
CO2 SERPL-SCNC: 22 MMOL/L — SIGNIFICANT CHANGE UP (ref 22–31)
COPPER SERPL-MCNC: 44 UG/DL — LOW (ref 72–166)
CREAT SERPL-MCNC: 1.23 MG/DL — SIGNIFICANT CHANGE UP (ref 0.5–1.3)
GLUCOSE BLDC GLUCOMTR-MCNC: 169 MG/DL — HIGH (ref 70–99)
GLUCOSE BLDC GLUCOMTR-MCNC: 205 MG/DL — HIGH (ref 70–99)
GLUCOSE BLDC GLUCOMTR-MCNC: 211 MG/DL — HIGH (ref 70–99)
GLUCOSE SERPL-MCNC: 137 MG/DL — HIGH (ref 70–99)
HCT VFR BLD CALC: 22.6 % — LOW (ref 39–50)
HCT VFR BLD CALC: 23 % — LOW (ref 39–50)
HCT VFR BLD CALC: 23.2 % — LOW (ref 39–50)
HGB BLD-MCNC: 7.7 G/DL — LOW (ref 13–17)
HGB BLD-MCNC: 7.7 G/DL — LOW (ref 13–17)
HGB BLD-MCNC: 7.9 G/DL — LOW (ref 13–17)
INR BLD: 2.15 RATIO — HIGH (ref 0.88–1.16)
IRON SATN MFR SERPL: 135 UG/DL — SIGNIFICANT CHANGE UP (ref 45–165)
IRON SATN MFR SERPL: SIGNIFICANT CHANGE UP % (ref 16–55)
MCHC RBC-ENTMCNC: 32.2 PG — SIGNIFICANT CHANGE UP (ref 27–34)
MCHC RBC-ENTMCNC: 32.8 PG — SIGNIFICANT CHANGE UP (ref 27–34)
MCHC RBC-ENTMCNC: 32.9 PG — SIGNIFICANT CHANGE UP (ref 27–34)
MCHC RBC-ENTMCNC: 33.5 GM/DL — SIGNIFICANT CHANGE UP (ref 32–36)
MCHC RBC-ENTMCNC: 34.1 GM/DL — SIGNIFICANT CHANGE UP (ref 32–36)
MCHC RBC-ENTMCNC: 34.1 GM/DL — SIGNIFICANT CHANGE UP (ref 32–36)
MCV RBC AUTO: 96.2 FL — SIGNIFICANT CHANGE UP (ref 80–100)
MCV RBC AUTO: 96.2 FL — SIGNIFICANT CHANGE UP (ref 80–100)
MCV RBC AUTO: 96.7 FL — SIGNIFICANT CHANGE UP (ref 80–100)
NRBC # BLD: 0 /100 WBCS — SIGNIFICANT CHANGE UP (ref 0–0)
PLATELET # BLD AUTO: 75 K/UL — LOW (ref 150–400)
PLATELET # BLD AUTO: 79 K/UL — LOW (ref 150–400)
PLATELET # BLD AUTO: 80 K/UL — LOW (ref 150–400)
POTASSIUM SERPL-MCNC: 4 MMOL/L — SIGNIFICANT CHANGE UP (ref 3.5–5.3)
POTASSIUM SERPL-SCNC: 4 MMOL/L — SIGNIFICANT CHANGE UP (ref 3.5–5.3)
PROT SERPL-MCNC: 5.3 G/DL — LOW (ref 6–8.3)
PROTHROM AB SERPL-ACNC: 25.1 SEC — HIGH (ref 10–12.9)
RBC # BLD: 2.35 M/UL — LOW (ref 4.2–5.8)
RBC # BLD: 2.39 M/UL — LOW (ref 4.2–5.8)
RBC # BLD: 2.4 M/UL — LOW (ref 4.2–5.8)
RBC # FLD: 24 % — HIGH (ref 10.3–14.5)
RBC # FLD: 24.1 % — HIGH (ref 10.3–14.5)
RBC # FLD: 24.3 % — HIGH (ref 10.3–14.5)
SODIUM SERPL-SCNC: 139 MMOL/L — SIGNIFICANT CHANGE UP (ref 135–145)
TIBC SERPL-MCNC: SIGNIFICANT CHANGE UP UG/DL (ref 220–430)
UIBC SERPL-MCNC: <20 UG/DL — LOW (ref 110–370)
WBC # BLD: 4.73 K/UL — SIGNIFICANT CHANGE UP (ref 3.8–10.5)
WBC # BLD: 5.06 K/UL — SIGNIFICANT CHANGE UP (ref 3.8–10.5)
WBC # BLD: 5.46 K/UL — SIGNIFICANT CHANGE UP (ref 3.8–10.5)
WBC # FLD AUTO: 4.73 K/UL — SIGNIFICANT CHANGE UP (ref 3.8–10.5)
WBC # FLD AUTO: 5.06 K/UL — SIGNIFICANT CHANGE UP (ref 3.8–10.5)
WBC # FLD AUTO: 5.46 K/UL — SIGNIFICANT CHANGE UP (ref 3.8–10.5)

## 2019-12-28 PROCEDURE — 93010 ELECTROCARDIOGRAM REPORT: CPT

## 2019-12-28 PROCEDURE — 99232 SBSQ HOSP IP/OBS MODERATE 35: CPT | Mod: GC

## 2019-12-28 PROCEDURE — 99232 SBSQ HOSP IP/OBS MODERATE 35: CPT

## 2019-12-28 RX ADMIN — MIDODRINE HYDROCHLORIDE 20 MILLIGRAM(S): 2.5 TABLET ORAL at 12:07

## 2019-12-28 RX ADMIN — LACTULOSE 20 GRAM(S): 10 SOLUTION ORAL at 17:24

## 2019-12-28 RX ADMIN — Medication 1 GRAM(S): at 17:24

## 2019-12-28 RX ADMIN — LACTULOSE 20 GRAM(S): 10 SOLUTION ORAL at 12:07

## 2019-12-28 RX ADMIN — LACTULOSE 20 GRAM(S): 10 SOLUTION ORAL at 05:28

## 2019-12-28 RX ADMIN — Medication 2: at 18:04

## 2019-12-28 RX ADMIN — PANTOPRAZOLE SODIUM 10 MG/HR: 20 TABLET, DELAYED RELEASE ORAL at 12:07

## 2019-12-28 RX ADMIN — Medication 1: at 12:49

## 2019-12-28 RX ADMIN — MIDODRINE HYDROCHLORIDE 20 MILLIGRAM(S): 2.5 TABLET ORAL at 21:14

## 2019-12-28 RX ADMIN — CEFTRIAXONE 100 MILLIGRAM(S): 500 INJECTION, POWDER, FOR SOLUTION INTRAMUSCULAR; INTRAVENOUS at 05:28

## 2019-12-28 RX ADMIN — Medication 8 UNIT(S): at 12:49

## 2019-12-28 RX ADMIN — MIDODRINE HYDROCHLORIDE 20 MILLIGRAM(S): 2.5 TABLET ORAL at 05:29

## 2019-12-28 RX ADMIN — TAMSULOSIN HYDROCHLORIDE 0.4 MILLIGRAM(S): 0.4 CAPSULE ORAL at 21:14

## 2019-12-28 RX ADMIN — INSULIN GLARGINE 28 UNIT(S): 100 INJECTION, SOLUTION SUBCUTANEOUS at 22:29

## 2019-12-28 RX ADMIN — Medication 8 UNIT(S): at 09:07

## 2019-12-28 RX ADMIN — LACTULOSE 20 GRAM(S): 10 SOLUTION ORAL at 22:29

## 2019-12-28 RX ADMIN — PANTOPRAZOLE SODIUM 10 MG/HR: 20 TABLET, DELAYED RELEASE ORAL at 21:14

## 2019-12-28 RX ADMIN — Medication 1 GRAM(S): at 05:29

## 2019-12-28 RX ADMIN — Medication 8 UNIT(S): at 18:05

## 2019-12-28 NOTE — PROGRESS NOTE ADULT - SUBJECTIVE AND OBJECTIVE BOX
SUBJECTIVE / OVERNIGHT EVENTS: pt seen and examined      MEDICATIONS  (STANDING):  cefTRIAXone   IVPB 1000 milliGRAM(s) IV Intermittent every 24 hours  dextrose 5%. 1000 milliLiter(s) (50 mL/Hr) IV Continuous <Continuous>  dextrose 50% Injectable 12.5 Gram(s) IV Push once  dextrose 50% Injectable 25 Gram(s) IV Push once  dextrose 50% Injectable 25 Gram(s) IV Push once  insulin glargine Injectable (LANTUS) 28 Unit(s) SubCutaneous at bedtime  insulin lispro (HumaLOG) corrective regimen sliding scale   SubCutaneous three times a day before meals  insulin lispro (HumaLOG) corrective regimen sliding scale   SubCutaneous at bedtime  insulin lispro Injectable (HumaLOG) 8 Unit(s) SubCutaneous three times a day before meals  lactulose Syrup 20 Gram(s) Oral every 6 hours  midodrine. 20 milliGRAM(s) Oral three times a day  pantoprazole Infusion 8 mG/Hr (10 mL/Hr) IV Continuous <Continuous>  rifAXIMin 550 milliGRAM(s) Oral two times a day  sucralfate 1 Gram(s) Oral two times a day  tamsulosin 0.4 milliGRAM(s) Oral at bedtime    MEDICATIONS  (PRN):  dextrose 40% Gel 15 Gram(s) Oral once PRN Blood Glucose LESS THAN 70 milliGRAM(s)/deciliter  glucagon  Injectable 1 milliGRAM(s) IntraMuscular once PRN Glucose LESS THAN 70 milligrams/deciliter      Vital Signs Last 24 Hrs  T(C): 36.8 (28 Dec 2019 14:15), Max: 37.1 (27 Dec 2019 20:23)  T(F): 98.2 (28 Dec 2019 14:15), Max: 98.8 (27 Dec 2019 20:23)  HR: 77 (28 Dec 2019 14:15) (77 - 85)  BP: 100/58 (28 Dec 2019 14:15) (100/58 - 116/67)  BP(mean): --  RR: 18 (28 Dec 2019 14:15) (17 - 18)  SpO2: 95% (28 Dec 2019 14:15) (95% - 96%)  CAPILLARY BLOOD GLUCOSE      POCT Blood Glucose.: 211 mg/dL (28 Dec 2019 17:56)  POCT Blood Glucose.: 169 mg/dL (28 Dec 2019 12:48)  POCT Blood Glucose.: 133 mg/dL (28 Dec 2019 08:52)  POCT Blood Glucose.: 171 mg/dL (27 Dec 2019 22:03)    I&O's Summary    27 Dec 2019 07:01  -  28 Dec 2019 07:00  --------------------------------------------------------  IN: 430 mL / OUT: 1350 mL / NET: -920 mL    28 Dec 2019 07:01  -  28 Dec 2019 19:13  --------------------------------------------------------  IN: 840 mL / OUT: 750 mL / NET: 90 mL        Constitutional: No fever, fatigue  Skin: No rash.  Eyes: No recent vision problems or eye pain.  ENT: No congestion, ear pain, or sore throat.  Cardiovascular: No chest pain or palpation.  Respiratory: No cough, shortness of breath, congestion, or wheezing.  Gastrointestinal: No abdominal pain, nausea, vomiting, or diarrhea.  Genitourinary: No dysuria.  Musculoskeletal: No joint swelling.  Neurologic: No headache.    PHYSICAL EXAM:  GENERAL: NAD  EYES: EOMI, PERRLA  NECK: Supple, No JVD  CHEST/LUNG: dec breath sounds rt base  HEART:  S1 , S2 +  ABDOMEN: soft , bs+, mild distension+  EXTREMITIES:  trace edema  NEUROLOGY: alert awake oriented x place, person      LABS:                        7.7    4.73  )-----------( 75       ( 28 Dec 2019 13:52 )             22.6     12-28    139  |  103  |  18  ----------------------------<  137<H>  4.0   |  22  |  1.23    Ca    9.2      28 Dec 2019 07:04  Phos  2.9     12-27  Mg     1.8     12-27    TPro  5.3<L>  /  Alb  2.6<L>  /  TBili  10.2<H>  /  DBili  x   /  AST  33  /  ALT  18  /  AlkPhos  147<H>  12-28    PT/INR - ( 28 Dec 2019 07:14 )   PT: 25.1 sec;   INR: 2.15 ratio         PTT - ( 26 Dec 2019 19:48 )  PTT:39.6 sec          RADIOLOGY & ADDITIONAL TESTS:    Imaging Personally Reviewed:    Consultant(s) Notes Reviewed:      Care Discussed with Consultants/Other Providers:

## 2019-12-28 NOTE — PROGRESS NOTE ADULT - ASSESSMENT
Impression:  1)Cirrhosis - JEAN, listed for transplant           MELD-Na: 25 on 12/27           EV: none reported           Ascites: trace           HE: grade 2           HCC: none  2) Anemia – normocytic, iron studies c/w chronic dz, without signs of overt bleeding but persistently dropping and requiring transfusion, duodenal ulcer in past.      Recommendations:   - monitor hemoglobin   - transfuse as needed (goal Hb = 7)   - two active IVs at all times   - active type and screen   - IV PPI gtt   - ceftriaxone daily for SBP ppx   - okay for regular diet   - start lactulose 30 mL Q6h   - continue rifaximin   - tentative plan for EGD Monday if encephalopathy resolves    Ze Dominguez  Gastroenterology Fellow  Pager# [phone] ([facility]) or [phone] ([facility])  GI Phone# [phone] ([facility])  Page on-call GI fellow via  from 5pm-8am and on weekends Impression:  1)Cirrhosis - JEAN, listed for transplant           MELD-Na: 25 on 12/27           EV: none reported           Ascites: trace           HE: none today           HCC: none  2) Anemia – normocytic, iron studies c/w chronic dz, without signs of overt bleeding but persistently dropping and requiring transfusion, duodenal ulcer in past.      Recommendations:   - monitor hemoglobin   - transfuse as needed (goal Hb = 7)   - two active IVs at all times   - active type and screen   - d/c IV PPI gtt, can switch to IV 40mg BID   - add miralax BID   - ceftriaxone daily for SBP ppx   - okay for regular diet   - start lactulose 30 mL Q6h   - continue rifaximin   - plan for EGD monday, NPO @ [facility] Sunday night    Ze Dominguez  Gastroenterology Fellow  Pager# [phone] ([facility]) or [phone] ([facility])  GI Phone# [phone] ([facility])  Page on-call GI fellow via  from 5pm-8am and on weekends

## 2019-12-28 NOTE — PHYSICAL THERAPY INITIAL EVALUATION ADULT - PERTINENT HX OF CURRENT PROBLEM, REHAB EVAL
Pt is a 63 y/o M w/ pmhx GIB, GERD, liver failure w/ hepatic encephalopathy, htn, and dm presented from NH for abd pain, leg shaking, and anemia. As per heme, transfuse as needed, tentative plan for EGD Monday 12/30.

## 2019-12-28 NOTE — PROGRESS NOTE ADULT - SUBJECTIVE AND OBJECTIVE BOX
[facility]  Ramón Ortega PA-C  [address] [place] [address]  [phone]      INTERVAL HPI/OVERNIGHT EVENTS:    events noted     MEDICATIONS  (STANDING):  cefTRIAXone   IVPB 1000 milliGRAM(s) IV Intermittent every 24 hours  dextrose 5%. 1000 milliLiter(s) (50 mL/Hr) IV Continuous <Continuous>  dextrose 50% Injectable 12.5 Gram(s) IV Push once  dextrose 50% Injectable 25 Gram(s) IV Push once  dextrose 50% Injectable 25 Gram(s) IV Push once  insulin glargine Injectable (LANTUS) 28 Unit(s) SubCutaneous at bedtime  insulin lispro (HumaLOG) corrective regimen sliding scale   SubCutaneous three times a day before meals  insulin lispro (HumaLOG) corrective regimen sliding scale   SubCutaneous at bedtime  insulin lispro Injectable (HumaLOG) 8 Unit(s) SubCutaneous three times a day before meals  lactulose Syrup 20 Gram(s) Oral every 6 hours  midodrine. 20 milliGRAM(s) Oral three times a day  pantoprazole Infusion 8 mG/Hr (10 mL/Hr) IV Continuous <Continuous>  rifAXIMin 550 milliGRAM(s) Oral two times a day  sucralfate 1 Gram(s) Oral two times a day  tamsulosin 0.4 milliGRAM(s) Oral at bedtime    MEDICATIONS  (PRN):  dextrose 40% Gel 15 Gram(s) Oral once PRN Blood Glucose LESS THAN 70 milliGRAM(s)/deciliter  glucagon  Injectable 1 milliGRAM(s) IntraMuscular once PRN Glucose LESS THAN 70 milligrams/deciliter      Allergies    codeine (Anaphylaxis)  Tomatoes (Unknown)    Intolerances    NO  RED MEAT (Unknown)      ROS:   General:  No wt loss, fevers, chills, night sweats, fatigue,   Eyes:  Good vision, no reported pain  ENT:  No sore throat, pain, runny nose, dysphagia  CV:  No pain, palpitations, hypo/hypertension  Resp:  No dyspnea, cough, tachypnea, wheezing  GI:  No pain, No nausea, No vomiting, No diarrhea, No constipation, No weight loss, No fever, No pruritis, No rectal bleeding, No tarry stools, No dysphagia,  :  No pain, bleeding, incontinence, nocturia  Muscle:  No pain, weakness  Neuro:  No weakness, tingling, memory problems  Psych:  No fatigue, insomnia, mood problems, depression  Endocrine:  No polyuria, polydipsia, cold/heat intolerance  Heme:  No petechiae, ecchymosis, easy bruisability  Skin:  No rash, tattoos, scars, edema      PHYSICAL EXAM:   Vital Signs:  Vital Signs Last 24 Hrs  T(C): 36.8 (28 Dec 2019 14:15), Max: 37.1 (27 Dec 2019 20:23)  T(F): 98.2 (28 Dec 2019 14:15), Max: 98.8 (27 Dec 2019 20:23)  HR: 77 (28 Dec 2019 14:15) (77 - 99)  BP: 100/58 (28 Dec 2019 14:15) (100/58 - 116/67)  BP(mean): --  RR: 18 (28 Dec 2019 14:15) (17 - 18)  SpO2: 95% (28 Dec 2019 14:15) (95% - 96%)  Daily     Daily Weight in k.9 (28 Dec 2019 04:48)    GENERAL:  Appears stated age, well-groomed, well-nourished, no distress  HEENT:  NC/AT,  conjunctivae clear and pink, no thyromegaly, nodules, adenopathy, no JVD, sclera -anicteric  CHEST:  Full & symmetric excursion, no increased effort, breath sounds clear  HEART:  Regular rhythm, S1, S2, no murmur/rub/S3/S4, no abdominal bruit, no edema  ABDOMEN:  Soft, non-tender, non-distended, normoactive bowel sounds,  no masses ,no hepato-splenomegaly, no signs of chronic liver disease  EXTEREMITIES:  no cyanosis,clubbing or edema  SKIN:  No rash/erythema/ecchymoses/petechiae/wounds/abscess/warm/dry  NEURO:  Alert, oriented, no asterixis, no tremor, no encephalopathy      LABS:                        7.7    4.73  )-----------( 75       ( 28 Dec 2019 13:52 )             22.6         139  |  103  |  18  ----------------------------<  137<H>  4.0   |  22  |  1.23    Ca    9.2      28 Dec 2019 07:04  Phos  2.9     12  Mg     1.8         TPro  5.3<L>  /  Alb  2.6<L>  /  TBili  10.2<H>  /  DBili  x   /  AST  33  /  ALT  18  /  AlkPhos  147<H>      PT/INR - ( 28 Dec 2019 07:14 )   PT: 25.1 sec;   INR: 2.15 ratio         PTT - ( 26 Dec 2019 19:48 )  PTT:39.6 sec      RADIOLOGY & ADDITIONAL TESTS:

## 2019-12-28 NOTE — PHYSICAL THERAPY INITIAL EVALUATION ADULT - CRITERIA FOR SKILLED THERAPEUTIC INTERVENTIONS
functional limitations in following categories/predicted duration of therapy intervention/therapy frequency/risk reduction/prevention/rehab potential/anticipated discharge recommendation/impairments found

## 2019-12-28 NOTE — PROVIDER CONTACT NOTE (OTHER) - BACKGROUND
history of GIB--GERD--hepatic encephalopathy--obesity--fatty liver disease--renal stones--HTN--neuropathy--diabetes

## 2019-12-28 NOTE — PROGRESS NOTE ADULT - SUBJECTIVE AND OBJECTIVE BOX
Chief Complaint:  Patient is a 64y old  Male who presents with a chief complaint of low Hgb (27 Dec 2019 16:18)    Interval Events: Hb dropped again to 6.8, getting 1u. Pt denies melena or bright red blood per rectum.     Hospital Medications:  cefTRIAXone   IVPB 1000 milliGRAM(s) IV Intermittent every 24 hours  dextrose 40% Gel 15 Gram(s) Oral once PRN  dextrose 5%. 1000 milliLiter(s) IV Continuous <Continuous>  dextrose 50% Injectable 12.5 Gram(s) IV Push once  dextrose 50% Injectable 25 Gram(s) IV Push once  dextrose 50% Injectable 25 Gram(s) IV Push once  glucagon  Injectable 1 milliGRAM(s) IntraMuscular once PRN  insulin glargine Injectable (LANTUS) 28 Unit(s) SubCutaneous at bedtime  insulin lispro (HumaLOG) corrective regimen sliding scale   SubCutaneous three times a day before meals  insulin lispro (HumaLOG) corrective regimen sliding scale   SubCutaneous at bedtime  insulin lispro Injectable (HumaLOG) 8 Unit(s) SubCutaneous three times a day before meals  lactulose Syrup 20 Gram(s) Oral every 6 hours  midodrine. 20 milliGRAM(s) Oral three times a day  pantoprazole Infusion 8 mG/Hr IV Continuous <Continuous>  rifAXIMin 550 milliGRAM(s) Oral two times a day  sucralfate 1 Gram(s) Oral two times a day  tamsulosin 0.4 milliGRAM(s) Oral at bedtime    ROS:   General:  No wt loss, fevers, chills, night sweats  Eyes:  Good vision, no reported pain  ENT:  No sore throat, pain, runny nose, dysphagia  CV:  No pain, palpitations, hypo/hypertension  Pulm:  No dyspnea, cough, tachypnea, wheezing  GI:  See HPI, otherwise negative  :  No pain, bleeding, incontinence, nocturia  Muscle:  No pain, weakness  Neuro:  No weakness, tingling, memory problems  Psych:  No fatigue, insomnia, mood problems, depression  Endocrine:  No polyuria, polydipsia, cold/heat intolerance  Heme:  No petechiae, ecchymosis, easy bruisability  Skin:  No rash, tattoos, scars, edema    PHYSICAL EXAM:   Vital Signs:  Vital Signs Last 24 Hrs  T(C): 36.5 (28 Dec 2019 04:48), Max: 37.1 (27 Dec 2019 20:23)  T(F): 97.7 (28 Dec 2019 04:48), Max: 98.8 (27 Dec 2019 20:23)  HR: 84 (28 Dec 2019 04:48) (78 - 99)  BP: 116/67 (28 Dec 2019 04:48) (105/55 - 121/67)  BP(mean): --  RR: 17 (28 Dec 2019 04:48) (17 - 18)  SpO2: 96% (28 Dec 2019 04:48) (95% - 97%)  Daily     Daily Weight in k.9 (28 Dec 2019 04:48)    GENERAL: no acute distress  NEURO: alert, answers questions appropriately, + asterixis  HEENT: anicteric sclera, no conjunctival pallor appreciated  CHEST: no respiratory distress, no accessory muscle use  CARDIAC: regular rate, rhythm  ABDOMEN: soft, non-tender, non-distended, no rebound or guarding  EXTREMITIES: warm, well perfused, no edema  SKIN: no lesions noted    LABS: reviewed                        6.9    5.09  )-----------( 86       ( 27 Dec 2019 23:03 )             21.2         137  |  102  |  18  ----------------------------<  85  3.9   |  25  |  1.17    Ca    9.4      27 Dec 2019 05:49  Phos  2.9       Mg     1.8         TPro  5.1<L>  /  Alb  2.8<L>  /  TBili  9.7<H>  /  DBili  x   /  AST  33  /  ALT  21  /  AlkPhos  125<H>      LIVER FUNCTIONS - ( 27 Dec 2019 05:49 )  Alb: 2.8 g/dL / Pro: 5.1 g/dL / ALK PHOS: 125 U/L / ALT: 21 U/L / AST: 33 U/L / GGT: x             Interval Diagnostic Studies: see sunrise for full report Chief Complaint:  Patient is a 64y old  Male who presents with a chief complaint of low Hgb (27 Dec 2019 16:18)    Interval Events: Hb dropped again to 6.8, getting 1u. Pt denies melena or bright red blood per rectum.     Hospital Medications:  cefTRIAXone   IVPB 1000 milliGRAM(s) IV Intermittent every 24 hours  dextrose 40% Gel 15 Gram(s) Oral once PRN  dextrose 5%. 1000 milliLiter(s) IV Continuous <Continuous>  dextrose 50% Injectable 12.5 Gram(s) IV Push once  dextrose 50% Injectable 25 Gram(s) IV Push once  dextrose 50% Injectable 25 Gram(s) IV Push once  glucagon  Injectable 1 milliGRAM(s) IntraMuscular once PRN  insulin glargine Injectable (LANTUS) 28 Unit(s) SubCutaneous at bedtime  insulin lispro (HumaLOG) corrective regimen sliding scale   SubCutaneous three times a day before meals  insulin lispro (HumaLOG) corrective regimen sliding scale   SubCutaneous at bedtime  insulin lispro Injectable (HumaLOG) 8 Unit(s) SubCutaneous three times a day before meals  lactulose Syrup 20 Gram(s) Oral every 6 hours  midodrine. 20 milliGRAM(s) Oral three times a day  pantoprazole Infusion 8 mG/Hr IV Continuous <Continuous>  rifAXIMin 550 milliGRAM(s) Oral two times a day  sucralfate 1 Gram(s) Oral two times a day  tamsulosin 0.4 milliGRAM(s) Oral at bedtime    ROS:   General:  No wt loss, fevers, chills, night sweats  Eyes:  Good vision, no reported pain  ENT:  No sore throat, pain, runny nose, dysphagia  CV:  No pain, palpitations, hypo/hypertension  Pulm:  No dyspnea, cough, tachypnea, wheezing  GI:  See HPI, otherwise negative  :  No pain, bleeding, incontinence, nocturia  Muscle:  No pain, weakness  Neuro:  No weakness, tingling, memory problems  Psych:  No fatigue, insomnia, mood problems, depression  Endocrine:  No polyuria, polydipsia, cold/heat intolerance  Heme:  No petechiae, ecchymosis, easy bruisability  Skin:  No rash, tattoos, scars, edema    PHYSICAL EXAM:   Vital Signs:  Vital Signs Last 24 Hrs  T(C): 36.5 (28 Dec 2019 04:48), Max: 37.1 (27 Dec 2019 20:23)  T(F): 97.7 (28 Dec 2019 04:48), Max: 98.8 (27 Dec 2019 20:23)  HR: 84 (28 Dec 2019 04:48) (78 - 99)  BP: 116/67 (28 Dec 2019 04:48) (105/55 - 121/67)  BP(mean): --  RR: 17 (28 Dec 2019 04:48) (17 - 18)  SpO2: 96% (28 Dec 2019 04:48) (95% - 97%)  Daily     Daily Weight in k.9 (28 Dec 2019 04:48)    GENERAL: no acute distress  NEURO: alert, answers questions appropriately, no asterixis  HEENT: +icteric sclera, no conjunctival pallor appreciated  CHEST: no respiratory distress, no accessory muscle use  CARDIAC: regular rate, rhythm  ABDOMEN: soft, non-tender, non-distended, no rebound or guarding  EXTREMITIES: warm, well perfused, 1+ edema to BLE  SKIN: no lesions noted    LABS: reviewed                        6.9    5.09  )-----------( 86       ( 27 Dec 2019 23:03 )             21.2         137  |  102  |  18  ----------------------------<  85  3.9   |  25  |  1.17    Ca    9.4      27 Dec 2019 05:49  Phos  2.9       Mg     1.8         TPro  5.1<L>  /  Alb  2.8<L>  /  TBili  9.7<H>  /  DBili  x   /  AST  33  /  ALT  21  /  AlkPhos  125<H>      LIVER FUNCTIONS - ( 27 Dec 2019 05:49 )  Alb: 2.8 g/dL / Pro: 5.1 g/dL / ALK PHOS: 125 U/L / ALT: 21 U/L / AST: 33 U/L / GGT: x             Interval Diagnostic Studies: see sunrise for full report

## 2019-12-28 NOTE — PROGRESS NOTE ADULT - SUBJECTIVE AND OBJECTIVE BOX
[facility] - Sushila Dhaliwal, Doug, Morenita, Ayo, Colleen Williamson  Office Number:  [phone]    Patient resting comfortably in bed in NAD.  Laying flat with no respiratory distress.  No complaints of chest pain, dyspnea, palpitations, PND, or orthopnea.    ROS: negative unless otherwise mentioned.    Telemetry:  off    MEDICATIONS  (STANDING):  cefTRIAXone   IVPB 1000 milliGRAM(s) IV Intermittent every 24 hours  dextrose 5%. 1000 milliLiter(s) (50 mL/Hr) IV Continuous <Continuous>  dextrose 50% Injectable 12.5 Gram(s) IV Push once  dextrose 50% Injectable 25 Gram(s) IV Push once  dextrose 50% Injectable 25 Gram(s) IV Push once  insulin glargine Injectable (LANTUS) 28 Unit(s) SubCutaneous at bedtime  insulin lispro (HumaLOG) corrective regimen sliding scale   SubCutaneous three times a day before meals  insulin lispro (HumaLOG) corrective regimen sliding scale   SubCutaneous at bedtime  insulin lispro Injectable (HumaLOG) 8 Unit(s) SubCutaneous three times a day before meals  lactulose Syrup 20 Gram(s) Oral every 6 hours  midodrine. 20 milliGRAM(s) Oral three times a day  pantoprazole Infusion 8 mG/Hr (10 mL/Hr) IV Continuous <Continuous>  rifAXIMin 550 milliGRAM(s) Oral two times a day  sucralfate 1 Gram(s) Oral two times a day  tamsulosin 0.4 milliGRAM(s) Oral at bedtime    MEDICATIONS  (PRN):  dextrose 40% Gel 15 Gram(s) Oral once PRN Blood Glucose LESS THAN 70 milliGRAM(s)/deciliter  glucagon  Injectable 1 milliGRAM(s) IntraMuscular once PRN Glucose LESS THAN 70 milligrams/deciliter      Allergies    codeine (Anaphylaxis)  Tomatoes (Unknown)    Intolerances    NO  RED MEAT (Unknown)      Vital Signs Last 24 Hrs  T(C): 36.5 (28 Dec 2019 04:48), Max: 37.1 (27 Dec 2019 20:23)  T(F): 97.7 (28 Dec 2019 04:48), Max: 98.8 (27 Dec 2019 20:23)  HR: 84 (28 Dec 2019 04:48) (78 - 99)  BP: 116/67 (28 Dec 2019 04:48) (105/55 - 121/67)  BP(mean): --  RR: 17 (28 Dec 2019 04:48) (17 - 18)  SpO2: 96% (28 Dec 2019 04:48) (95% - 97%)    I&O's Summary    27 Dec 2019 07:01  -  28 Dec 2019 07:00  --------------------------------------------------------  IN: 430 mL / OUT: 1350 mL / NET: -920 mL        ON EXAM:    Constitutional: NAD, alert and oriented x 3, obese  Lungs:  Non-labored, breath sounds are clear bilaterally, No wheezing, rales or rhonchi  Cardiovascular: RRR.  S1 and S2 positive.  No murmurs, rubs, gallops or clicks  Gastrointestinal: Bowel Sounds present, soft, nontender.   Lymph: trace peripheral edema. No cervical lymphadenopathy.  Neurological: Alert, no focal deficits  Skin: No rashes or ulcers   Psych:  Mood & affect appropriate.  LABS: All Labs Reviewed:                        7.7    5.06  )-----------( 79       ( 28 Dec 2019 07:14 )             23.0                         6.9    5.09  )-----------( 86       ( 27 Dec 2019 23:03 )             21.2                         7.8    5.03  )-----------( 83       ( 27 Dec 2019 14:47 )             23.2     28 Dec 2019 07:04    139    |  103    |  18     ----------------------------<  137    4.0     |  22     |  1.23   27 Dec 2019 05:49    137    |  102    |  18     ----------------------------<  85     3.9     |  25     |  1.17   26 Dec 2019 19:48    134    |  100    |  19     ----------------------------<  171    4.3     |  20     |  1.18     Ca    9.2        28 Dec 2019 07:04  Ca    9.4        27 Dec 2019 05:49  Ca    9.0        26 Dec 2019 19:48  Phos  2.9       27 Dec 2019 05:51  Mg     1.8       27 Dec 2019 05:51    TPro  5.3    /  Alb  2.6    /  TBili  10.2   /  DBili  x      /  AST  33     /  ALT  18     /  AlkPhos  147    28 Dec 2019 07:04  TPro  5.1    /  Alb  2.8    /  TBili  9.7    /  DBili  x      /  AST  33     /  ALT  21     /  AlkPhos  125    27 Dec 2019 05:49  TPro  5.7    /  Alb  3.1    /  TBili  8.9    /  DBili  x      /  AST  42     /  ALT  25     /  AlkPhos  157    26 Dec 2019 19:48    PT/INR - ( 28 Dec 2019 07:14 )   PT: 25.1 sec;   INR: 2.15 ratio         PTT - ( 26 Dec 2019 19:48 )  PTT:39.6 sec      Blood Culture:

## 2019-12-28 NOTE — PHYSICAL THERAPY INITIAL EVALUATION ADULT - ADDITIONAL COMMENTS
PTA pt reports being at rehab, has been in/out of hospital since October, at rehab was receiving PT/OT, working on ambulating short distances with RW and balance. Prior to rehab pt lived in pvt home with wife and sons, 3 steps to enter +HR, used RW to ambulate.

## 2019-12-28 NOTE — PROGRESS NOTE ADULT - ASSESSMENT
Mr. Segundo is a 63 y/o male with a PMH of GERD w/ esophagitis, T2DM on insulin, HLD, non-alcoholic cirrhosis w/ hepatic encephalopathy and SBP, HFpEF, GIB, and chronic thrombocytopenia, admitted for recurrent anemia below Hgb of 7 requiring transfusion.

## 2019-12-28 NOTE — PROGRESS NOTE ADULT - ASSESSMENT
63 year old male with HTN, DM2, non-alcoholic cirrhosis, who was recently admitted with altered mental status, hyponatremia and high ammonia level. We last saw him during a hospitalization in 10/2019, during which he was significantly volume overloaded and required iv diuresis.  He is currently being evaluated for liver transplant.  He returns 2 days ago with acute blood loss anemia. CT with trace pleural effusions, anasarca, and atherosclerotic calcifications.    Abnormal EKG  - His EKGs have demonstrated prolonged qtc in the past  - repeat an ekg here  - avoid qtc prolonging medications and replete K to greater than 4 and mg>2     Diastolic HF   - no significant volume overload on exam. CXR is clear and CT with only trace effusions  - watch volume with PRBC transfusions  - keep diuretics on hold.   - TTE normal LV systolic function EF 65%; LVH, DD Grade I  - cath with normal filling pressures and no significant cad.  - no asa in setting of acute blood loss anemia.    HTN  - BP on softer side  - hold diuretics and anti-hypertensives  - midodrine as needed    - GI follow up  - No cardiac contraindication to proceeding with EGD. Routine cardiac monitoring is recommended.  - All other workup as per primary team

## 2019-12-28 NOTE — PROGRESS NOTE ADULT - ASSESSMENT
cirrhosis  anemia   h/o duodenal ulcer    cont proton pump inhibitor  diet as tolerated  monitor cbc   cont lactulose/xifaxin  rocephin for sbp prophylaxis  plan for  upper gastrointestinal endoscopy monday with hepatology

## 2019-12-28 NOTE — PHYSICAL THERAPY INITIAL EVALUATION ADULT - MANUAL MUSCLE TESTING RESULTS, REHAB EVAL
Addended by: COLTON MART on: 12/14/2018 09:21 AM     Modules accepted: Orders    
Addended by: SANDRA GONG on: 12/18/2018 02:53 PM     Modules accepted: Orders    
grossly BUE 4/5, BLE 3+/5

## 2019-12-29 LAB
ALBUMIN SERPL ELPH-MCNC: 2.3 G/DL — LOW (ref 3.3–5)
ALP SERPL-CCNC: 137 U/L — HIGH (ref 40–120)
ALT FLD-CCNC: 18 U/L — SIGNIFICANT CHANGE UP (ref 10–45)
ANION GAP SERPL CALC-SCNC: 11 MMOL/L — SIGNIFICANT CHANGE UP (ref 5–17)
AST SERPL-CCNC: 30 U/L — SIGNIFICANT CHANGE UP (ref 10–40)
BILIRUB SERPL-MCNC: 9.2 MG/DL — HIGH (ref 0.2–1.2)
BUN SERPL-MCNC: 14 MG/DL — SIGNIFICANT CHANGE UP (ref 7–23)
CALCIUM SERPL-MCNC: 8.5 MG/DL — SIGNIFICANT CHANGE UP (ref 8.4–10.5)
CHLORIDE SERPL-SCNC: 102 MMOL/L — SIGNIFICANT CHANGE UP (ref 96–108)
CO2 SERPL-SCNC: 22 MMOL/L — SIGNIFICANT CHANGE UP (ref 22–31)
CREAT SERPL-MCNC: 1.26 MG/DL — SIGNIFICANT CHANGE UP (ref 0.5–1.3)
GLUCOSE BLDC GLUCOMTR-MCNC: 103 MG/DL — HIGH (ref 70–99)
GLUCOSE BLDC GLUCOMTR-MCNC: 140 MG/DL — HIGH (ref 70–99)
GLUCOSE BLDC GLUCOMTR-MCNC: 156 MG/DL — HIGH (ref 70–99)
GLUCOSE BLDC GLUCOMTR-MCNC: 191 MG/DL — HIGH (ref 70–99)
GLUCOSE SERPL-MCNC: 102 MG/DL — HIGH (ref 70–99)
HCT VFR BLD CALC: 22.4 % — LOW (ref 39–50)
HCT VFR BLD CALC: 22.4 % — LOW (ref 39–50)
HCT VFR BLD CALC: 25.1 % — LOW (ref 39–50)
HGB BLD-MCNC: 7.4 G/DL — LOW (ref 13–17)
HGB BLD-MCNC: 7.5 G/DL — LOW (ref 13–17)
HGB BLD-MCNC: 8.1 G/DL — LOW (ref 13–17)
INR BLD: 2.12 RATIO — HIGH (ref 0.88–1.16)
MCHC RBC-ENTMCNC: 32 PG — SIGNIFICANT CHANGE UP (ref 27–34)
MCHC RBC-ENTMCNC: 32.3 GM/DL — SIGNIFICANT CHANGE UP (ref 32–36)
MCHC RBC-ENTMCNC: 32.5 PG — SIGNIFICANT CHANGE UP (ref 27–34)
MCHC RBC-ENTMCNC: 32.9 PG — SIGNIFICANT CHANGE UP (ref 27–34)
MCHC RBC-ENTMCNC: 33 GM/DL — SIGNIFICANT CHANGE UP (ref 32–36)
MCHC RBC-ENTMCNC: 33.5 GM/DL — SIGNIFICANT CHANGE UP (ref 32–36)
MCV RBC AUTO: 98.2 FL — SIGNIFICANT CHANGE UP (ref 80–100)
MCV RBC AUTO: 98.2 FL — SIGNIFICANT CHANGE UP (ref 80–100)
MCV RBC AUTO: 99.2 FL — SIGNIFICANT CHANGE UP (ref 80–100)
NRBC # BLD: 0 /100 WBCS — SIGNIFICANT CHANGE UP (ref 0–0)
PLATELET # BLD AUTO: 72 K/UL — LOW (ref 150–400)
PLATELET # BLD AUTO: 77 K/UL — LOW (ref 150–400)
PLATELET # BLD AUTO: 81 K/UL — LOW (ref 150–400)
POTASSIUM SERPL-MCNC: 3.9 MMOL/L — SIGNIFICANT CHANGE UP (ref 3.5–5.3)
POTASSIUM SERPL-SCNC: 3.9 MMOL/L — SIGNIFICANT CHANGE UP (ref 3.5–5.3)
PROT SERPL-MCNC: 4.9 G/DL — LOW (ref 6–8.3)
PROTHROM AB SERPL-ACNC: 24.9 SEC — HIGH (ref 10–12.9)
RBC # BLD: 2.28 M/UL — LOW (ref 4.2–5.8)
RBC # BLD: 2.28 M/UL — LOW (ref 4.2–5.8)
RBC # BLD: 2.53 M/UL — LOW (ref 4.2–5.8)
RBC # FLD: 23.8 % — HIGH (ref 10.3–14.5)
RBC # FLD: 23.9 % — HIGH (ref 10.3–14.5)
RBC # FLD: 23.9 % — HIGH (ref 10.3–14.5)
SODIUM SERPL-SCNC: 135 MMOL/L — SIGNIFICANT CHANGE UP (ref 135–145)
WBC # BLD: 5.26 K/UL — SIGNIFICANT CHANGE UP (ref 3.8–10.5)
WBC # BLD: 5.37 K/UL — SIGNIFICANT CHANGE UP (ref 3.8–10.5)
WBC # BLD: 5.53 K/UL — SIGNIFICANT CHANGE UP (ref 3.8–10.5)
WBC # FLD AUTO: 5.26 K/UL — SIGNIFICANT CHANGE UP (ref 3.8–10.5)
WBC # FLD AUTO: 5.37 K/UL — SIGNIFICANT CHANGE UP (ref 3.8–10.5)
WBC # FLD AUTO: 5.53 K/UL — SIGNIFICANT CHANGE UP (ref 3.8–10.5)

## 2019-12-29 PROCEDURE — 99232 SBSQ HOSP IP/OBS MODERATE 35: CPT

## 2019-12-29 PROCEDURE — 99232 SBSQ HOSP IP/OBS MODERATE 35: CPT | Mod: GC

## 2019-12-29 RX ORDER — INSULIN GLARGINE 100 [IU]/ML
14 INJECTION, SOLUTION SUBCUTANEOUS ONCE
Refills: 0 | Status: COMPLETED | OUTPATIENT
Start: 2019-12-29 | End: 2019-12-29

## 2019-12-29 RX ADMIN — Medication 8 UNIT(S): at 13:33

## 2019-12-29 RX ADMIN — Medication 1 GRAM(S): at 17:43

## 2019-12-29 RX ADMIN — LACTULOSE 20 GRAM(S): 10 SOLUTION ORAL at 17:43

## 2019-12-29 RX ADMIN — TAMSULOSIN HYDROCHLORIDE 0.4 MILLIGRAM(S): 0.4 CAPSULE ORAL at 21:59

## 2019-12-29 RX ADMIN — MIDODRINE HYDROCHLORIDE 20 MILLIGRAM(S): 2.5 TABLET ORAL at 05:42

## 2019-12-29 RX ADMIN — Medication 8 UNIT(S): at 17:55

## 2019-12-29 RX ADMIN — LACTULOSE 20 GRAM(S): 10 SOLUTION ORAL at 13:31

## 2019-12-29 RX ADMIN — LACTULOSE 20 GRAM(S): 10 SOLUTION ORAL at 05:42

## 2019-12-29 RX ADMIN — Medication 1 GRAM(S): at 05:42

## 2019-12-29 RX ADMIN — Medication 8 UNIT(S): at 08:58

## 2019-12-29 RX ADMIN — CEFTRIAXONE 100 MILLIGRAM(S): 500 INJECTION, POWDER, FOR SOLUTION INTRAMUSCULAR; INTRAVENOUS at 05:41

## 2019-12-29 RX ADMIN — MIDODRINE HYDROCHLORIDE 20 MILLIGRAM(S): 2.5 TABLET ORAL at 13:31

## 2019-12-29 RX ADMIN — Medication 1: at 13:33

## 2019-12-29 RX ADMIN — LACTULOSE 20 GRAM(S): 10 SOLUTION ORAL at 23:00

## 2019-12-29 RX ADMIN — INSULIN GLARGINE 14 UNIT(S): 100 INJECTION, SOLUTION SUBCUTANEOUS at 21:59

## 2019-12-29 RX ADMIN — MIDODRINE HYDROCHLORIDE 20 MILLIGRAM(S): 2.5 TABLET ORAL at 17:43

## 2019-12-29 NOTE — PROGRESS NOTE ADULT - SUBJECTIVE AND OBJECTIVE BOX
[facility]  Ramón Ortega PA-C  [address] [place] [address]  [phone]      INTERVAL HPI/OVERNIGHT EVENTS:    hgb fluctuating     MEDICATIONS  (STANDING):  cefTRIAXone   IVPB 1000 milliGRAM(s) IV Intermittent every 24 hours  dextrose 5%. 1000 milliLiter(s) (50 mL/Hr) IV Continuous <Continuous>  dextrose 50% Injectable 12.5 Gram(s) IV Push once  dextrose 50% Injectable 25 Gram(s) IV Push once  dextrose 50% Injectable 25 Gram(s) IV Push once  insulin glargine Injectable (LANTUS) 28 Unit(s) SubCutaneous at bedtime  insulin lispro (HumaLOG) corrective regimen sliding scale   SubCutaneous three times a day before meals  insulin lispro (HumaLOG) corrective regimen sliding scale   SubCutaneous at bedtime  insulin lispro Injectable (HumaLOG) 8 Unit(s) SubCutaneous three times a day before meals  lactulose Syrup 20 Gram(s) Oral every 6 hours  midodrine. 20 milliGRAM(s) Oral three times a day  pantoprazole Infusion 8 mG/Hr (10 mL/Hr) IV Continuous <Continuous>  rifAXIMin 550 milliGRAM(s) Oral two times a day  sucralfate 1 Gram(s) Oral two times a day  tamsulosin 0.4 milliGRAM(s) Oral at bedtime    MEDICATIONS  (PRN):  dextrose 40% Gel 15 Gram(s) Oral once PRN Blood Glucose LESS THAN 70 milliGRAM(s)/deciliter  glucagon  Injectable 1 milliGRAM(s) IntraMuscular once PRN Glucose LESS THAN 70 milligrams/deciliter      Allergies    codeine (Anaphylaxis)  Tomatoes (Unknown)    Intolerances    NO  RED MEAT (Unknown)      ROS:   General:  No wt loss, fevers, chills, night sweats, fatigue,   Eyes:  Good vision, no reported pain  ENT:  No sore throat, pain, runny nose, dysphagia  CV:  No pain, palpitations, hypo/hypertension  Resp:  No dyspnea, cough, tachypnea, wheezing  GI:  No pain, No nausea, No vomiting, No diarrhea, No constipation, No weight loss, No fever, No pruritis, No rectal bleeding, No tarry stools, No dysphagia,  :  No pain, bleeding, incontinence, nocturia  Muscle:  No pain, weakness  Neuro:  No weakness, tingling, memory problems  Psych:  No fatigue, insomnia, mood problems, depression  Endocrine:  No polyuria, polydipsia, cold/heat intolerance  Heme:  No petechiae, ecchymosis, easy bruisability  Skin:  No rash, tattoos, scars, edema      PHYSICAL EXAM:   Vital Signs:  Vital Signs Last 24 Hrs  T(C): 36.8 (28 Dec 2019 14:15), Max: 37.1 (27 Dec 2019 20:23)  T(F): 98.2 (28 Dec 2019 14:15), Max: 98.8 (27 Dec 2019 20:23)  HR: 77 (28 Dec 2019 14:15) (77 - 99)  BP: 100/58 (28 Dec 2019 14:15) (100/58 - 116/67)  BP(mean): --  RR: 18 (28 Dec 2019 14:15) (17 - 18)  SpO2: 95% (28 Dec 2019 14:15) (95% - 96%)  Daily     Daily Weight in k.9 (28 Dec 2019 04:48)    GENERAL:  Appears stated age, well-groomed, well-nourished, no distress  HEENT:  NC/AT,  conjunctivae clear and pink, no thyromegaly, nodules, adenopathy, no JVD, sclera -anicteric  CHEST:  Full & symmetric excursion, no increased effort, breath sounds clear  HEART:  Regular rhythm, S1, S2, no murmur/rub/S3/S4, no abdominal bruit, no edema  ABDOMEN:  Soft, non-tender, non-distended, normoactive bowel sounds,  no masses ,no hepato-splenomegaly, no signs of chronic liver disease  EXTEREMITIES:  no cyanosis,clubbing or edema  SKIN:  No rash/erythema/ecchymoses/petechiae/wounds/abscess/warm/dry  NEURO:  Alert, oriented, no asterixis, no tremor, no encephalopathy      LABS:                        7.7    4.73  )-----------( 75       ( 28 Dec 2019 13:52 )             22.6         139  |  103  |  18  ----------------------------<  137<H>  4.0   |  22  |  1.23    Ca    9.2      28 Dec 2019 07:04  Phos  2.9     12  Mg     1.8         TPro  5.3<L>  /  Alb  2.6<L>  /  TBili  10.2<H>  /  DBili  x   /  AST  33  /  ALT  18  /  AlkPhos  147<H>  12    PT/INR - ( 28 Dec 2019 07:14 )   PT: 25.1 sec;   INR: 2.15 ratio         PTT - ( 26 Dec 2019 19:48 )  PTT:39.6 sec      RADIOLOGY & ADDITIONAL TESTS:

## 2019-12-29 NOTE — PROGRESS NOTE ADULT - SUBJECTIVE AND OBJECTIVE BOX
SUBJECTIVE / OVERNIGHT EVENTS: pt seen and examined    MEDICATIONS  (STANDING):  cefTRIAXone   IVPB 1000 milliGRAM(s) IV Intermittent every 24 hours  dextrose 5%. 1000 milliLiter(s) (50 mL/Hr) IV Continuous <Continuous>  dextrose 50% Injectable 12.5 Gram(s) IV Push once  dextrose 50% Injectable 25 Gram(s) IV Push once  dextrose 50% Injectable 25 Gram(s) IV Push once  insulin glargine Injectable (LANTUS) 28 Unit(s) SubCutaneous at bedtime  insulin lispro (HumaLOG) corrective regimen sliding scale   SubCutaneous three times a day before meals  insulin lispro (HumaLOG) corrective regimen sliding scale   SubCutaneous at bedtime  insulin lispro Injectable (HumaLOG) 8 Unit(s) SubCutaneous three times a day before meals  lactulose Syrup 20 Gram(s) Oral every 6 hours  midodrine. 20 milliGRAM(s) Oral three times a day  pantoprazole Infusion 8 mG/Hr (10 mL/Hr) IV Continuous <Continuous>  rifAXIMin 550 milliGRAM(s) Oral two times a day  sucralfate 1 Gram(s) Oral two times a day  tamsulosin 0.4 milliGRAM(s) Oral at bedtime    MEDICATIONS  (PRN):  dextrose 40% Gel 15 Gram(s) Oral once PRN Blood Glucose LESS THAN 70 milliGRAM(s)/deciliter  glucagon  Injectable 1 milliGRAM(s) IntraMuscular once PRN Glucose LESS THAN 70 milligrams/deciliter    Vital Signs Last 24 Hrs  T(C): 37.1 (29 Dec 2019 20:19), Max: 37.1 (29 Dec 2019 20:19)  T(F): 98.7 (29 Dec 2019 20:19), Max: 98.7 (29 Dec 2019 20:19)  HR: 81 (29 Dec 2019 20:19) (76 - 82)  BP: 116/67 (29 Dec 2019 20:19) (108/56 - 118/60)  BP(mean): --  RR: 17 (29 Dec 2019 20:19) (17 - 18)  SpO2: 97% (29 Dec 2019 20:19) (97% - 98%)    Constitutional: No fever, fatigue  Skin: No rash.  Eyes: No recent vision problems or eye pain.  ENT: No congestion, ear pain, or sore throat.  Cardiovascular: No chest pain or palpation.  Respiratory: No cough, shortness of breath, congestion, or wheezing.  Gastrointestinal: No abdominal pain, nausea, vomiting, or diarrhea.  Genitourinary: No dysuria.  Musculoskeletal: No joint swelling.  Neurologic: No headache.    PHYSICAL EXAM:  GENERAL: NAD  EYES: EOMI, PERRLA  NECK: Supple, No JVD  CHEST/LUNG: dec breath sounds rt base  HEART:  S1 , S2 +  ABDOMEN: soft , bs+, mild distension+  EXTREMITIES:  trace edema  NEUROLOGY: alert awake oriented x place, person    LABS:  12-29    135  |  102  |  14  ----------------------------<  102<H>  3.9   |  22  |  1.26    Ca    8.5      29 Dec 2019 07:08    TPro  4.9<L>  /  Alb  2.3<L>  /  TBili  9.2<H>  /  DBili      /  AST  30  /  ALT  18  /  AlkPhos  137<H>  12-29    Creatinine Trend: 1.26 <--, 1.23 <--, 1.17 <--, 1.18 <--                        7.5    5.26  )-----------( 72       ( 29 Dec 2019 23:42 )             22.4     Urine Studies:            LIVER FUNCTIONS - ( 29 Dec 2019 07:08 )  Alb: 2.3 g/dL / Pro: 4.9 g/dL / ALK PHOS: 137 U/L / ALT: 18 U/L / AST: 30 U/L / GGT: x           PT/INR - ( 29 Dec 2019 07:08 )   PT: 24.9 sec;   INR: 2.12 ratio           RADIOLOGY & ADDITIONAL TESTS:    Imaging Personally Reviewed:    Consultant(s) Notes Reviewed:      Care Discussed with Consultants/Other Providers:

## 2019-12-29 NOTE — OCCUPATIONAL THERAPY INITIAL EVALUATION ADULT - PLANNED THERAPY INTERVENTIONS, OT EVAL
ADL retraining/bed mobility training/parent/caregiver training.../transfer training/balance training/neuromuscular re-education

## 2019-12-29 NOTE — PROGRESS NOTE ADULT - SUBJECTIVE AND OBJECTIVE BOX
[facility] - Sushila Dhaliwal, Doug, Morenita, Ayo, Colleen Williamson  Office Number:  [phone]    Patient resting comfortably in bed in NAD.  Laying flat with no respiratory distress.  No complaints of chest pain, dyspnea, palpitations, PND, or orthopnea.    ROS: negative unless otherwise mentioned.    Telemetry:  off    MEDICATIONS  (STANDING):  cefTRIAXone   IVPB 1000 milliGRAM(s) IV Intermittent every 24 hours  dextrose 5%. 1000 milliLiter(s) (50 mL/Hr) IV Continuous <Continuous>  dextrose 50% Injectable 12.5 Gram(s) IV Push once  dextrose 50% Injectable 25 Gram(s) IV Push once  dextrose 50% Injectable 25 Gram(s) IV Push once  insulin glargine Injectable (LANTUS) 28 Unit(s) SubCutaneous at bedtime  insulin lispro (HumaLOG) corrective regimen sliding scale   SubCutaneous three times a day before meals  insulin lispro (HumaLOG) corrective regimen sliding scale   SubCutaneous at bedtime  insulin lispro Injectable (HumaLOG) 8 Unit(s) SubCutaneous three times a day before meals  lactulose Syrup 20 Gram(s) Oral every 6 hours  midodrine. 20 milliGRAM(s) Oral three times a day  pantoprazole Infusion 8 mG/Hr (10 mL/Hr) IV Continuous <Continuous>  rifAXIMin 550 milliGRAM(s) Oral two times a day  sucralfate 1 Gram(s) Oral two times a day  tamsulosin 0.4 milliGRAM(s) Oral at bedtime    MEDICATIONS  (PRN):  dextrose 40% Gel 15 Gram(s) Oral once PRN Blood Glucose LESS THAN 70 milliGRAM(s)/deciliter  glucagon  Injectable 1 milliGRAM(s) IntraMuscular once PRN Glucose LESS THAN 70 milligrams/deciliter      Allergies    codeine (Anaphylaxis)  Tomatoes (Unknown)    Intolerances    NO  RED MEAT (Unknown)      Vital Signs Last 24 Hrs  T(C): 36.8 (29 Dec 2019 04:42), Max: 37.1 (28 Dec 2019 20:04)  T(F): 98.2 (29 Dec 2019 04:42), Max: 98.8 (28 Dec 2019 20:04)  HR: 82 (29 Dec 2019 04:42) (77 - 82)  BP: 108/56 (29 Dec 2019 04:42) (100/58 - 119/64)  BP(mean): --  RR: 18 (29 Dec 2019 04:42) (18 - 18)  SpO2: 98% (29 Dec 2019 04:42) (95% - 98%)    I&O's Summary    28 Dec 2019 07:01  -  29 Dec 2019 07:00  --------------------------------------------------------  IN: 1440 mL / OUT: 1250 mL / NET: 190 mL        ON EXAM:    Constitutional: NAD, alert and oriented x 3, obese  Lungs:  Non-labored, breath sounds are clear bilaterally, No wheezing, rales or rhonchi  Cardiovascular: RRR.  S1 and S2 positive.  No murmurs, rubs, gallops or clicks  Gastrointestinal: Bowel Sounds present, soft, nontender.   Lymph: trace peripheral edema. No cervical lymphadenopathy.  Neurological: Alert, no focal deficits  Skin: No rashes or ulcers   Psych:  Mood & affect appropriate.    LABS: All Labs Reviewed:                        7.4    5.37  )-----------( 77       ( 29 Dec 2019 07:08 )             22.4                         7.9    5.46  )-----------( 80       ( 28 Dec 2019 23:12 )             23.2                         7.7    4.73  )-----------( 75       ( 28 Dec 2019 13:52 )             22.6     29 Dec 2019 07:08    135    |  102    |  14     ----------------------------<  102    3.9     |  22     |  1.26   28 Dec 2019 07:04    139    |  103    |  18     ----------------------------<  137    4.0     |  22     |  1.23   27 Dec 2019 05:49    137    |  102    |  18     ----------------------------<  85     3.9     |  25     |  1.17     Ca    8.5        29 Dec 2019 07:08  Ca    9.2        28 Dec 2019 07:04  Ca    9.4        27 Dec 2019 05:49  Phos  2.9       27 Dec 2019 05:51  Mg     1.8       27 Dec 2019 05:51    TPro  4.9    /  Alb  2.3    /  TBili  9.2    /  DBili  x      /  AST  30     /  ALT  18     /  AlkPhos  137    29 Dec 2019 07:08  TPro  5.3    /  Alb  2.6    /  TBili  10.2   /  DBili  x      /  AST  33     /  ALT  18     /  AlkPhos  147    28 Dec 2019 07:04  TPro  5.1    /  Alb  2.8    /  TBili  9.7    /  DBili  x      /  AST  33     /  ALT  21     /  AlkPhos  125    27 Dec 2019 05:49    PT/INR - ( 29 Dec 2019 07:08 )   PT: 24.9 sec;   INR: 2.12 ratio               Blood Culture:

## 2019-12-29 NOTE — PROGRESS NOTE ADULT - SUBJECTIVE AND OBJECTIVE BOX
Chief Complaint:  Patient is a 64y old  Male who presents with a chief complaint of low Hgb (29 Dec 2019 13:08)    Interval Events: Pt having brown bowel movements. Hb stable.     Hospital Medications:  cefTRIAXone   IVPB 1000 milliGRAM(s) IV Intermittent every 24 hours  dextrose 40% Gel 15 Gram(s) Oral once PRN  dextrose 5%. 1000 milliLiter(s) IV Continuous <Continuous>  dextrose 50% Injectable 12.5 Gram(s) IV Push once  dextrose 50% Injectable 25 Gram(s) IV Push once  dextrose 50% Injectable 25 Gram(s) IV Push once  glucagon  Injectable 1 milliGRAM(s) IntraMuscular once PRN  insulin glargine Injectable (LANTUS) 28 Unit(s) SubCutaneous at bedtime  insulin lispro (HumaLOG) corrective regimen sliding scale   SubCutaneous three times a day before meals  insulin lispro (HumaLOG) corrective regimen sliding scale   SubCutaneous at bedtime  insulin lispro Injectable (HumaLOG) 8 Unit(s) SubCutaneous three times a day before meals  lactulose Syrup 20 Gram(s) Oral every 6 hours  midodrine. 20 milliGRAM(s) Oral three times a day  pantoprazole Infusion 8 mG/Hr IV Continuous <Continuous>  rifAXIMin 550 milliGRAM(s) Oral two times a day  sucralfate 1 Gram(s) Oral two times a day  tamsulosin 0.4 milliGRAM(s) Oral at bedtime    ROS:   General:  No wt loss, fevers, chills, night sweats  Eyes:  Good vision, no reported pain  ENT:  No sore throat, pain, runny nose, dysphagia  CV:  No pain, palpitations, hypo/hypertension  Pulm:  No dyspnea, cough, tachypnea, wheezing  GI:  See HPI, otherwise negative  :  No pain, bleeding, incontinence, nocturia  Muscle:  No pain, weakness  Neuro:  No weakness, tingling, memory problems  Psych:  No fatigue, insomnia, mood problems, depression  Endocrine:  No polyuria, polydipsia, cold/heat intolerance  Heme:  No petechiae, ecchymosis, easy bruisability  Skin:  No rash, tattoos, scars, edema    PHYSICAL EXAM:   Vital Signs:  Vital Signs Last 24 Hrs  T(C): 36.8 (29 Dec 2019 04:42), Max: 37.1 (28 Dec 2019 20:04)  T(F): 98.2 (29 Dec 2019 04:42), Max: 98.8 (28 Dec 2019 20:04)  HR: 82 (29 Dec 2019 04:42) (77 - 82)  BP: 108/56 (29 Dec 2019 04:42) (100/58 - 119/64)  BP(mean): --  RR: 18 (29 Dec 2019 04:42) (18 - 18)  SpO2: 98% (29 Dec 2019 04:42) (95% - 98%)  Daily     Daily Weight in k.9 (29 Dec 2019 07:10)    GENERAL: no acute distress  NEURO: alert and oriented x 3, no asterixis  HEENT: anicteric sclera, no conjunctival pallor appreciated  CHEST: no respiratory distress, no accessory muscle use  CARDIAC: regular rate, rhythm  ABDOMEN: soft, non-tender, non-distended, no rebound or guarding  EXTREMITIES: warm, well perfused, pitting edema to mid thigh  SKIN: no lesions noted    LABS: reviewed                        7.4    5.37  )-----------( 77       ( 29 Dec 2019 07:08 )             22.4     12-    135  |  102  |  14  ----------------------------<  102<H>  3.9   |  22  |  1.26    Ca    8.5      29 Dec 2019 07:08    TPro  4.9<L>  /  Alb  2.3<L>  /  TBili  9.2<H>  /  DBili  x   /  AST  30  /  ALT  18  /  AlkPhos  137<H>  12-29    LIVER FUNCTIONS - ( 29 Dec 2019 07:08 )  Alb: 2.3 g/dL / Pro: 4.9 g/dL / ALK PHOS: 137 U/L / ALT: 18 U/L / AST: 30 U/L / GGT: x             Interval Diagnostic Studies: see sunrise for full report

## 2019-12-29 NOTE — PROGRESS NOTE ADULT - ASSESSMENT
63 year old male with HTN, DM2, non-alcoholic cirrhosis, who was recently admitted with altered mental status, hyponatremia and high ammonia level. We last saw him during a hospitalization in 10/2019, during which he was significantly volume overloaded and required iv diuresis.  He is currently being evaluated for liver transplant.  He returns 2 days ago with acute blood loss anemia. CT with trace pleural effusions, anasarca, and atherosclerotic calcifications.    Abnormal EKG  - His EKGs have demonstrated prolonged qtc in the past  - repeat ekg  - avoid qtc prolonging medications and replete K to greater than 4 and mg>2     Diastolic HF   - no significant volume overload on exam. CXR is clear and CT with only trace effusions  - watch volume with PRBC transfusions  - keep diuretics on hold.   - TTE normal LV systolic function EF 65%; LVH, DD Grade I  - cath with normal filling pressures and no significant cad.  - no asa in setting of acute blood loss anemia.    HTN  - BP on softer side  - hold diuretics and anti-hypertensives  - midodrine as needed    Anemia  - transfuse to keep Hb >8  - GI follow up  - No cardiac contraindication to proceeding with EGD tomorrow. Routine cardiac monitoring is recommended.  - All other workup as per primary team

## 2019-12-29 NOTE — PROGRESS NOTE ADULT - ASSESSMENT
Impression:  1)Cirrhosis - JEAN, listed for transplant           MELD-Na: 25 on 12/27           EV: none reported           Ascites: trace           HE: none today           HCC: none  2) Anemia – normocytic, iron studies c/w chronic dz, without signs of overt bleeding but persistently dropping and requiring transfusion, duodenal ulcer in past.      Recommendations:  - trend Hb daily, transfuse to Hb = 7  - IV PPI 40mg BID  - CTX for SBP ppx (7d course)  - lactulose 30mg q6h, c/w rifaximin  - can start low dose PO diuretics (i.e. lasix 20mg daily); carefully monitor renal fx given prior MISA from diuretics)  - regular diet  - NPO @ MN  - EGD tomorrow     Ze Dominguez  Gastroenterology Fellow  Pager# [phone] ([facility]) or [phone] ([facility])  GI Phone# [phone] ([facility])  Page on-call GI fellow via  from 5pm-8am and on weekends Impression:  1)Cirrhosis - JEAN, listed for transplant           MELD-Na: 25 on 12/27           EV: none reported           Ascites: trace           HE: none today           HCC: none  2) Anemia – normocytic, iron studies c/w chronic dz, without signs of overt bleeding but persistently dropping and requiring transfusion, duodenal ulcer in past.      Recommendations:  - trend Hb daily, transfuse to Hb = 7  - IV PPI 40mg BID  - CTX for SBP ppx (7d course)  - lactulose 30mg q6h, c/w rifaximin  - can start low dose PO diuretics (i.e. lasix 20mg daily); carefully monitor renal fx given prior MISA from diuretics)  - regular diet  - NPO @ MN (do not give lactulose after midnight)  - EGD tomorrow     Ze Dominguez  Gastroenterology Fellow  Pager# [phone] ([facility]) or [phone] ([facility])  GI Phone# [phone] ([facility])  Page on-call GI fellow via  from 5pm-8am and on weekends

## 2019-12-29 NOTE — OCCUPATIONAL THERAPY INITIAL EVALUATION ADULT - ADL RETRAINING, OT EVAL
Goal: Pt will perform LB dressing with supervision within 2 weeks. Pt will be I with UB dressing within 2 weeks.

## 2019-12-29 NOTE — OCCUPATIONAL THERAPY INITIAL EVALUATION ADULT - ADDITIONAL COMMENTS
Pt admitted from [facility]. As per patient, prior to first hospitalization in October patient was living with spouse in a private home and was I with BADL and mobility.

## 2019-12-30 LAB
ALBUMIN SERPL ELPH-MCNC: 2.5 G/DL — LOW (ref 3.3–5)
ALP SERPL-CCNC: 123 U/L — HIGH (ref 40–120)
ALT FLD-CCNC: 17 U/L — SIGNIFICANT CHANGE UP (ref 10–45)
ANION GAP SERPL CALC-SCNC: 10 MMOL/L — SIGNIFICANT CHANGE UP (ref 5–17)
APTT BLD: 40.8 SEC — HIGH (ref 27.5–36.3)
AST SERPL-CCNC: 29 U/L — SIGNIFICANT CHANGE UP (ref 10–40)
BILIRUB SERPL-MCNC: 10.1 MG/DL — HIGH (ref 0.2–1.2)
BUN SERPL-MCNC: 14 MG/DL — SIGNIFICANT CHANGE UP (ref 7–23)
CALCIUM SERPL-MCNC: 8.7 MG/DL — SIGNIFICANT CHANGE UP (ref 8.4–10.5)
CHLORIDE SERPL-SCNC: 104 MMOL/L — SIGNIFICANT CHANGE UP (ref 96–108)
CO2 SERPL-SCNC: 25 MMOL/L — SIGNIFICANT CHANGE UP (ref 22–31)
CREAT SERPL-MCNC: 1.12 MG/DL — SIGNIFICANT CHANGE UP (ref 0.5–1.3)
GLUCOSE BLDC GLUCOMTR-MCNC: 103 MG/DL — HIGH (ref 70–99)
GLUCOSE BLDC GLUCOMTR-MCNC: 126 MG/DL — HIGH (ref 70–99)
GLUCOSE BLDC GLUCOMTR-MCNC: 163 MG/DL — HIGH (ref 70–99)
GLUCOSE BLDC GLUCOMTR-MCNC: 192 MG/DL — HIGH (ref 70–99)
GLUCOSE BLDC GLUCOMTR-MCNC: 220 MG/DL — HIGH (ref 70–99)
GLUCOSE SERPL-MCNC: 101 MG/DL — HIGH (ref 70–99)
HCT VFR BLD CALC: 23.6 % — LOW (ref 39–50)
HCT VFR BLD CALC: 23.7 % — LOW (ref 39–50)
HCT VFR BLD CALC: 23.9 % — LOW (ref 39–50)
HGB BLD-MCNC: 7.6 G/DL — LOW (ref 13–17)
HGB BLD-MCNC: 7.8 G/DL — LOW (ref 13–17)
HGB BLD-MCNC: 7.9 G/DL — LOW (ref 13–17)
INR BLD: 2.13 RATIO — HIGH (ref 0.88–1.16)
MCHC RBC-ENTMCNC: 31.8 GM/DL — LOW (ref 32–36)
MCHC RBC-ENTMCNC: 32.5 PG — SIGNIFICANT CHANGE UP (ref 27–34)
MCHC RBC-ENTMCNC: 33.1 GM/DL — SIGNIFICANT CHANGE UP (ref 32–36)
MCHC RBC-ENTMCNC: 33.1 PG — SIGNIFICANT CHANGE UP (ref 27–34)
MCHC RBC-ENTMCNC: 33.3 GM/DL — SIGNIFICANT CHANGE UP (ref 32–36)
MCHC RBC-ENTMCNC: 33.3 PG — SIGNIFICANT CHANGE UP (ref 27–34)
MCV RBC AUTO: 100.9 FL — HIGH (ref 80–100)
MCV RBC AUTO: 102.1 FL — HIGH (ref 80–100)
MCV RBC AUTO: 99.2 FL — SIGNIFICANT CHANGE UP (ref 80–100)
NRBC # BLD: 0 /100 WBCS — SIGNIFICANT CHANGE UP (ref 0–0)
NRBC # BLD: 0 /100 WBCS — SIGNIFICANT CHANGE UP (ref 0–0)
PLATELET # BLD AUTO: 73 K/UL — LOW (ref 150–400)
PLATELET # BLD AUTO: 74 K/UL — LOW (ref 150–400)
PLATELET # BLD AUTO: 74 K/UL — LOW (ref 150–400)
POTASSIUM SERPL-MCNC: 4 MMOL/L — SIGNIFICANT CHANGE UP (ref 3.5–5.3)
POTASSIUM SERPL-SCNC: 4 MMOL/L — SIGNIFICANT CHANGE UP (ref 3.5–5.3)
PROT SERPL-MCNC: 5 G/DL — LOW (ref 6–8.3)
PROTHROM AB SERPL-ACNC: 24.9 SEC — HIGH (ref 10–13.1)
RBC # BLD: 2.34 M/UL — LOW (ref 4.2–5.8)
RBC # BLD: 2.34 M/UL — LOW (ref 4.2–5.8)
RBC # BLD: 2.39 M/UL — LOW (ref 4.2–5.8)
RBC # FLD: 23.8 % — HIGH (ref 10.3–14.5)
RBC # FLD: 24.3 % — HIGH (ref 10.3–14.5)
RBC # FLD: 24.3 % — HIGH (ref 10.3–14.5)
SODIUM SERPL-SCNC: 139 MMOL/L — SIGNIFICANT CHANGE UP (ref 135–145)
WBC # BLD: 4.95 K/UL — SIGNIFICANT CHANGE UP (ref 3.8–10.5)
WBC # BLD: 5.11 K/UL — SIGNIFICANT CHANGE UP (ref 3.8–10.5)
WBC # BLD: 5.62 K/UL — SIGNIFICANT CHANGE UP (ref 3.8–10.5)
WBC # FLD AUTO: 4.95 K/UL — SIGNIFICANT CHANGE UP (ref 3.8–10.5)
WBC # FLD AUTO: 5.11 K/UL — SIGNIFICANT CHANGE UP (ref 3.8–10.5)
WBC # FLD AUTO: 5.62 K/UL — SIGNIFICANT CHANGE UP (ref 3.8–10.5)

## 2019-12-30 PROCEDURE — 43255 EGD CONTROL BLEEDING ANY: CPT | Mod: GC

## 2019-12-30 PROCEDURE — 93971 EXTREMITY STUDY: CPT | Mod: 26

## 2019-12-30 PROCEDURE — 99232 SBSQ HOSP IP/OBS MODERATE 35: CPT | Mod: GC

## 2019-12-30 PROCEDURE — 99232 SBSQ HOSP IP/OBS MODERATE 35: CPT

## 2019-12-30 RX ADMIN — Medication 1 GRAM(S): at 18:11

## 2019-12-30 RX ADMIN — Medication 1 GRAM(S): at 05:43

## 2019-12-30 RX ADMIN — MIDODRINE HYDROCHLORIDE 20 MILLIGRAM(S): 2.5 TABLET ORAL at 18:11

## 2019-12-30 RX ADMIN — PANTOPRAZOLE SODIUM 10 MG/HR: 20 TABLET, DELAYED RELEASE ORAL at 21:48

## 2019-12-30 RX ADMIN — Medication 8 UNIT(S): at 18:12

## 2019-12-30 RX ADMIN — LACTULOSE 20 GRAM(S): 10 SOLUTION ORAL at 18:11

## 2019-12-30 RX ADMIN — TAMSULOSIN HYDROCHLORIDE 0.4 MILLIGRAM(S): 0.4 CAPSULE ORAL at 21:46

## 2019-12-30 RX ADMIN — LACTULOSE 20 GRAM(S): 10 SOLUTION ORAL at 13:24

## 2019-12-30 RX ADMIN — Medication 1: at 18:12

## 2019-12-30 RX ADMIN — CEFTRIAXONE 100 MILLIGRAM(S): 500 INJECTION, POWDER, FOR SOLUTION INTRAMUSCULAR; INTRAVENOUS at 05:43

## 2019-12-30 RX ADMIN — LACTULOSE 20 GRAM(S): 10 SOLUTION ORAL at 21:46

## 2019-12-30 RX ADMIN — LACTULOSE 20 GRAM(S): 10 SOLUTION ORAL at 05:43

## 2019-12-30 RX ADMIN — PANTOPRAZOLE SODIUM 10 MG/HR: 20 TABLET, DELAYED RELEASE ORAL at 18:11

## 2019-12-30 RX ADMIN — MIDODRINE HYDROCHLORIDE 20 MILLIGRAM(S): 2.5 TABLET ORAL at 13:24

## 2019-12-30 RX ADMIN — INSULIN GLARGINE 28 UNIT(S): 100 INJECTION, SOLUTION SUBCUTANEOUS at 21:46

## 2019-12-30 RX ADMIN — PANTOPRAZOLE SODIUM 10 MG/HR: 20 TABLET, DELAYED RELEASE ORAL at 05:44

## 2019-12-30 RX ADMIN — MIDODRINE HYDROCHLORIDE 20 MILLIGRAM(S): 2.5 TABLET ORAL at 05:43

## 2019-12-30 NOTE — PROGRESS NOTE ADULT - SUBJECTIVE AND OBJECTIVE BOX
Chief Complaint:  Patient is a 64y old  Male who presents with a chief complaint of low Hgb (30 Dec 2019 12:07)      Interval Events: Patient continues to complain of left leg pain.  Had EGD today for anemia.    Allergies:  codeine (Anaphylaxis)  NO  RED MEAT (Unknown)      Hospital Medications:  cefTRIAXone   IVPB 1000 milliGRAM(s) IV Intermittent every 24 hours  dextrose 40% Gel 15 Gram(s) Oral once PRN  dextrose 5%. 1000 milliLiter(s) IV Continuous <Continuous>  dextrose 50% Injectable 12.5 Gram(s) IV Push once  dextrose 50% Injectable 25 Gram(s) IV Push once  dextrose 50% Injectable 25 Gram(s) IV Push once  glucagon  Injectable 1 milliGRAM(s) IntraMuscular once PRN  insulin glargine Injectable (LANTUS) 28 Unit(s) SubCutaneous at bedtime  insulin lispro (HumaLOG) corrective regimen sliding scale   SubCutaneous three times a day before meals  insulin lispro (HumaLOG) corrective regimen sliding scale   SubCutaneous at bedtime  insulin lispro Injectable (HumaLOG) 8 Unit(s) SubCutaneous three times a day before meals  lactulose Syrup 20 Gram(s) Oral every 6 hours  midodrine. 20 milliGRAM(s) Oral three times a day  pantoprazole Infusion 8 mG/Hr IV Continuous <Continuous>  rifAXIMin 550 milliGRAM(s) Oral two times a day  sucralfate 1 Gram(s) Oral two times a day  tamsulosin 0.4 milliGRAM(s) Oral at bedtime      PMHX/PSHX:  GIB (gastrointestinal bleeding)  GERD with esophagitis  Hepatic encephalopathy  Obesity  Fatty liver disease, nonalcoholic  Renal stones  Hypertension  Neuropathy  Hypercholesteremia  Diabetes  S/P cholecystectomy  No significant past surgical history      Family history:  Family history of type 2 diabetes mellitus  Family history of hypertension  Family history of stomach cancer  No pertinent family history in first degree relatives      ROS:     General:  No wt loss, fevers, chills, night sweats, fatigue,   Eyes:  Good vision, no reported pain  ENT:  No sore throat, pain, runny nose, dysphagia  CV:  No pain, palpitations, hypo/hypertension  Resp:  No dyspnea, cough, tachypnea, wheezing  GI:  See HPI  :  No pain, bleeding, incontinence, nocturia  Muscle:  No pain, weakness  Neuro:  No weakness, tingling, memory problems  Psych:  No fatigue, insomnia, mood problems, depression  Endocrine:  No polyuria, polydipsia, cold/heat intolerance  Heme:  No petechiae, ecchymosis, easy bruisability  Skin:  No rash, edema      PHYSICAL EXAM:     GENERAL: NAD  HEENT:  NC/AT  CHEST:  Full & symmetric excursion, no increased effort  ABDOMEN:  Soft, non-tender, non-distended, +BS  EXTREMITIES:  Left thigh swelling, pain > right  SKIN:  No rash  NEURO:  Alert      Vital Signs:  Vital Signs Last 24 Hrs  T(C): 36.5 (30 Dec 2019 12:08), Max: 37.1 (29 Dec 2019 20:19)  T(F): 97.7 (30 Dec 2019 12:08), Max: 98.7 (29 Dec 2019 20:19)  HR: 84 (30 Dec 2019 12:08) (79 - 84)  BP: 107/62 (30 Dec 2019 12:08) (104/61 - 116/67)  BP(mean): --  RR: 18 (30 Dec 2019 12:08) (17 - 18)  SpO2: 94% (30 Dec 2019 12:08) (94% - 97%)  Daily     Daily Weight in k.5 (30 Dec 2019 07:58)    LABS:                        7.9    4.95  )-----------( 74       ( 30 Dec 2019 14:07 )             23.7     12-30    139  |  104  |  14  ----------------------------<  101<H>  4.0   |  25  |  1.12    Ca    8.7      30 Dec 2019 07:11    TPro  5.0<L>  /  Alb  2.5<L>  /  TBili  10.1<H>  /  DBili  x   /  AST  29  /  ALT  17  /  AlkPhos  123<H>  12-30    LIVER FUNCTIONS - ( 30 Dec 2019 07:11 )  Alb: 2.5 g/dL / Pro: 5.0 g/dL / ALK PHOS: 123 U/L / ALT: 17 U/L / AST: 29 U/L / GGT: x           PT/INR - ( 30 Dec 2019 08:37 )   PT: 24.9 sec;   INR: 2.13 ratio         PTT - ( 30 Dec 2019 08:37 )  PTT:40.8 sec        Imaging:  < from: CT Abdomen and Pelvis No Cont (12..19 @ 12:25) >    EXAM:  CT ABDOMEN AND PELVIS                            PROCEDURE DATE:  2019            INTERPRETATION:  CLINICAL INFORMATION: Cirrhosis. Anemia. Evaluate for retroperitoneal hemorrhage.     COMPARISON: None.    PROCEDURE:   CT of the Abdomen and Pelvis was performed without intravenous contrast.   Intravenous contrast: None.  Oral contrast: None.  Sagittal and coronal reformats were performed.    FINDINGS:    LOWER CHEST: Trace bilateral pleural effusions. Coronary atherosclerotic calcifications.    LIVER: Cirrhosis.  BILE DUCTS: Normal caliber.  GALLBLADDER: Cholecystectomy.  SPLEEN: Within normal limits.  PANCREAS: Within normal limits.  ADRENALS: Within normal limits.  KIDNEYS/URETERS: 4 mm left lower pole nonobstructive intrarenal calculus.    BLADDER: Normal.   REPRODUCTIVE ORGANS: Prostate within normal limits.    BOWEL: No bowel obstruction. Appendix is normal.  PERITONEUM: Trace ascites.  VESSELS: Para-aortic and perisplenic collateral vessels. Atherosclerotic changes.  RETROPERITONEUM/LYMPH NODES: No retroperitoneal hemorrhage. No lymphadenopathy.    ABDOMINAL WALL: Anasarca.   BONES: Degenerative changes.    IMPRESSION:     No retroperitoneal hemorrhage.    Anasarca.    SARAHY HOOD M.D., RADIOLOGY RESIDENT  This document has been electronically signed.  NOHEMI EAST M.D., ATTENDING RADIOLOGIST  This document has been electronically signed. Dec 27 2019  1:22PM        < end of copied text >      < from: Upper Endoscopy (19 @ 08:09) >    [facility]  ____________________________________________________________________________________________________  Patient Name: Prashant Segundo                       MRN: [id number]  Account Number: [id number]                     YOB: 1955  Room: Endoscopy Room 4                           Gender: Male  Attending MD: Nestor Schroeder MD          Procedure Date No Time: 2019  ____________________________________________________________________________________________________     Procedure:           Upper GI endoscopy  Indications:         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 Estimated blood loss: Minimal.  ____________________________________________________________________________________________________  Procedure:         Pre-Anesthesia Assessment:                       - Universal Protocol:                       - Pre-procedure Verification: Prior to the procedure, the patient's identity                        was verified by full name, date of birth and medical record number. The                        patient's identity was verified on all pertinent medical records, including                        History and Physical, nursing assessment and pre-anesthesia assessment. Also                        priorto the procedure, a History and Physical was performed, and patient                        medications, allergies and sensitivities were reviewed. The patient's                        tolerance of previous anesthesia was reviewed. The risks and benefits of the                        procedure and the sedation options and risks were discussed with the patient.                        All questions were answered and informed consent was obtained.                       - Marking: The endoscopic procedure was visually marked on a patient wrist                        band delineating the patient name, proposed procedure and endoscopist's                        initials.                       - Time-Out: Prior to the start of the procedure, the patient's                        identification, proposed procedure, accurate signed consent, correctly                        labeled images and records, and need for prophylactic antibiotics were                        verified by the physician, the nurse, the anesthesiologist and the                        anesthetist in the pre-procedure area in the procedure room.                       After obtaining informed consent, the endoscope was passed under direct                        vision. Throughout the procedure, the patient's blood pressure, pulse, and                        oxygen saturations were monitored continuously. The Endoscope was introduced                        through the mouth, and advanced to the second part of duodenum. The upper GI                        endoscopy was accomplished without difficulty. The patient tolerated the                        procedure well.                                                                                                        Findings:       One column of non-bleeding small (< 5 mm) varices that completely flatten with insufflation        were found in the lower third of the esophagus,. No stigmata of recent bleeding were evident        and no red beatriz signs were present. It is not amendable to band ligation.       The exam of the esophagus was otherwise normal.       Portal hypertensive gastropathy was found in the gastric fundus and in the gastric body.       Mild gastric antral vascular ectasia without bleeding was present in the gastric antrum.        Coagulation for bleeding prevention using argon plasma was successful.       The exam of the stomach was otherwise normal.       The examined duodenum was normal.                                                Impression:          - Non-bleeding small (< 5 mm) esophageal varices, not amendable to banding.                       - Portal hypertensive gastropathy.                       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 Treated with argon plasma                        coagulation (APC).                       - Normal examined duodenum.                       - No specimens collected.  Recommendation:      - [facility].                       - Resume regular diet.                       - Follow up with transplant hepatology                                                                                                        Attending Participation:   I was present and participated during the entire procedure, including non-key portions.                                                                                                          ________________________  Nestor Schroeder MD  2019 2:58:53 PM  Number of Addenda: 0    Note Initiated On: 2019 8:09 AM    < end of copied text >

## 2019-12-30 NOTE — PROGRESS NOTE ADULT - SUBJECTIVE AND OBJECTIVE BOX
INTERVAL HPI/OVERNIGHT EVENTS:    pt seen and examined  he denies abdominal pain, n/v  for  upper gastrointestinal endoscopy with house GI team today  hgb stable     MEDICATIONS  (STANDING):  cefTRIAXone   IVPB 1000 milliGRAM(s) IV Intermittent every 24 hours  dextrose 5%. 1000 milliLiter(s) (50 mL/Hr) IV Continuous <Continuous>  dextrose 50% Injectable 12.5 Gram(s) IV Push once  dextrose 50% Injectable 25 Gram(s) IV Push once  dextrose 50% Injectable 25 Gram(s) IV Push once  insulin glargine Injectable (LANTUS) 28 Unit(s) SubCutaneous at bedtime  insulin lispro (HumaLOG) corrective regimen sliding scale   SubCutaneous three times a day before meals  insulin lispro (HumaLOG) corrective regimen sliding scale   SubCutaneous at bedtime  insulin lispro Injectable (HumaLOG) 8 Unit(s) SubCutaneous three times a day before meals  lactulose Syrup 20 Gram(s) Oral every 6 hours  midodrine. 20 milliGRAM(s) Oral three times a day  pantoprazole Infusion 8 mG/Hr (10 mL/Hr) IV Continuous <Continuous>  rifAXIMin 550 milliGRAM(s) Oral two times a day  sucralfate 1 Gram(s) Oral two times a day  tamsulosin 0.4 milliGRAM(s) Oral at bedtime    MEDICATIONS  (PRN):  dextrose 40% Gel 15 Gram(s) Oral once PRN Blood Glucose LESS THAN 70 milliGRAM(s)/deciliter  glucagon  Injectable 1 milliGRAM(s) IntraMuscular once PRN Glucose LESS THAN 70 milligrams/deciliter      Allergies    codeine (Anaphylaxis)  Tomatoes (Unknown)    Intolerances    NO  RED MEAT (Unknown)      Review of Systems:    General:  No wt loss, fevers, chills, night sweats,fatigue,   Eyes:  Good vision, no reported pain  ENT:  No sore throat, pain, runny nose, dysphagia  CV:  No pain, palpitations, hypo/hypertension  Resp:  No dyspnea, cough, tachypnea, wheezing  GI:  No pain, No nausea, No vomiting, No diarrhea, No constipation, No weight loss, No fever, No pruritis, No rectal bleeding, No melena, No dysphagia  :  No pain, bleeding, incontinence, nocturia  Muscle:  No pain, weakness  Neuro:  No weakness, tingling, memory problems  Psych:  No fatigue, insomnia, mood problems, depression  Endocrine:  No polyuria, polydypsia, cold/heat intolerance  Heme:  No petechiae, ecchymosis, easy bruisability  Skin:  No rash, tattoos, scars, edema      Vital Signs Last 24 Hrs  T(C): 36.7 (30 Dec 2019 08:15), Max: 37.1 (29 Dec 2019 20:19)  T(F): 98.1 (30 Dec 2019 08:15), Max: 98.7 (29 Dec 2019 20:19)  HR: 79 (30 Dec 2019 08:15) (76 - 81)  BP: 109/57 (30 Dec 2019 08:15) (104/61 - 118/60)  BP(mean): --  RR: 18 (30 Dec 2019 08:15) (17 - 18)  SpO2: 97% (30 Dec 2019 08:15) (96% - 98%)    PHYSICAL EXAM:    Constitutional: NAD, well-developed  HEENT: EOMI, throat clear  Neck: No LAD, supple  Respiratory: CTA and P  Cardiovascular: S1 and S2, RRR, no M  Gastrointestinal: BS+, soft, NT/ND, neg HSM,  Extremities: No peripheral edema, neg clubing, cyanosis  Vascular: 2+ peripheral pulses  Neurological: A/O x 3, no focal deficits  Psychiatric: Normal mood, normal affect  Skin: No rashes      LABS:                        7.8    5.11  )-----------( 73       ( 30 Dec 2019 08:48 )             23.6     12-30    139  |  104  |  14  ----------------------------<  101<H>  4.0   |  25  |  1.12    Ca    8.7      30 Dec 2019 07:11    TPro  5.0<L>  /  Alb  2.5<L>  /  TBili  10.1<H>  /  DBili  x   /  AST  29  /  ALT  17  /  AlkPhos  123<H>  12-30    PT/INR - ( 30 Dec 2019 08:37 )   PT: 24.9 sec;   INR: 2.13 ratio         PTT - ( 30 Dec 2019 08:37 )  PTT:40.8 sec      RADIOLOGY & ADDITIONAL TESTS:

## 2019-12-30 NOTE — PROGRESS NOTE ADULT - ASSESSMENT
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 He was re-admitted from subacute rehab on 12/26/19 due to acute on chronic anemia without overt GI bleeding, also with hepatic encephalopathy.    # Left leg swelling and pain      - Left thigh significantly swollen compared to right, need to rule out acute DVT  #Acute on chronic anemia.  Baseline apprx 8, came in <6.  Possibly with hematemesis but no overt GI bleeding while here     - s/p EGD this morning with mild GAVE s/p APC treatment     - Hgb stable   # Decompensated JEAN cirrhosis, MELD-Na 25 on 12/30     - varices: grade I <5mm varices on EGD 12/30 not amenable to banding; also mild GAVE s/p APC      - SPE: none currently but waxes and wanes.  On lactulose and Xifaxan     - ascites: trace, not on diuretics     - HCC: none on CT this admission     - Transplant: Blood Type A positive, UNOS listed with updated MELD-Na of 27    Recommendations:  - please obtain stat LLE US Doppler to r/o DVT given significant pain and uneven swelling  - can switch to PPI BID for GAVE  - ok to stop ceftriaxone given no variceal bleeding  - would decrease lactulose to TID dosing as 4 times daily dosing is difficult as an outpatinet  - monitor and record number of BMs daily  - start folate, iron supplementation   - nutrition consultation to optimize nutritional status.  Low sodium diet  - PT consultation (patient needs aggressive PT pre-transplant)  - daily CBC, CMP, INR

## 2019-12-30 NOTE — PROGRESS NOTE ADULT - SUBJECTIVE AND OBJECTIVE BOX
[facility] - Sushila Dhaliwal, Doug, Morenita, Ayo, Colleen Williamson  Office Number:  [phone]    Patient resting comfortably in bed in NAD.  Laying flat with no respiratory distress.  No complaints of chest pain, dyspnea, palpitations, PND, or orthopnea.  No overnight events.    F/U for:  HFpEF    MEDICATIONS  (STANDING):  cefTRIAXone   IVPB 1000 milliGRAM(s) IV Intermittent every 24 hours  dextrose 5%. 1000 milliLiter(s) (50 mL/Hr) IV Continuous <Continuous>  dextrose 50% Injectable 12.5 Gram(s) IV Push once  dextrose 50% Injectable 25 Gram(s) IV Push once  dextrose 50% Injectable 25 Gram(s) IV Push once  insulin glargine Injectable (LANTUS) 28 Unit(s) SubCutaneous at bedtime  insulin lispro (HumaLOG) corrective regimen sliding scale   SubCutaneous three times a day before meals  insulin lispro (HumaLOG) corrective regimen sliding scale   SubCutaneous at bedtime  insulin lispro Injectable (HumaLOG) 8 Unit(s) SubCutaneous three times a day before meals  lactulose Syrup 20 Gram(s) Oral every 6 hours  midodrine. 20 milliGRAM(s) Oral three times a day  pantoprazole Infusion 8 mG/Hr (10 mL/Hr) IV Continuous <Continuous>  rifAXIMin 550 milliGRAM(s) Oral two times a day  sucralfate 1 Gram(s) Oral two times a day  tamsulosin 0.4 milliGRAM(s) Oral at bedtime    MEDICATIONS  (PRN):  dextrose 40% Gel 15 Gram(s) Oral once PRN Blood Glucose LESS THAN 70 milliGRAM(s)/deciliter  glucagon  Injectable 1 milliGRAM(s) IntraMuscular once PRN Glucose LESS THAN 70 milligrams/deciliter      Allergies    codeine (Anaphylaxis)  Tomatoes (Unknown)    Intolerances    NO  RED MEAT (Unknown)      Vital Signs Last 24 Hrs  T(C): 36.7 (30 Dec 2019 04:43), Max: 37.1 (29 Dec 2019 20:19)  T(F): 98 (30 Dec 2019 04:43), Max: 98.7 (29 Dec 2019 20:19)  HR: 81 (30 Dec 2019 04:43) (76 - 81)  BP: 104/61 (30 Dec 2019 04:43) (104/61 - 118/60)  BP(mean): --  RR: 17 (30 Dec 2019 04:43) (17 - 18)  SpO2: 96% (30 Dec 2019 04:43) (96% - 98%)    I&O's Summary    29 Dec 2019 07:01  -  30 Dec 2019 07:00  --------------------------------------------------------  IN: 1070 mL / OUT: 1590 mL / NET: -520 mL        ON EXAM:    Constitutional: NAD, alert and oriented x 3, obese  Lungs:  Non-labored, breath sounds are clear bilaterally, No wheezing, rales or rhonchi  Cardiovascular: RRR.  S1 and S2 positive.  No murmurs, rubs, gallops or clicks  Gastrointestinal: Bowel Sounds present, soft, nontender.   Lymph: trace peripheral edema. No cervical lymphadenopathy.  Neurological: Alert, no focal deficits  Skin: No rashes or ulcers   Psych:  Mood & affect appropriate.    LABS: All Labs Reviewed:                        7.5    5.26  )-----------( 72       ( 29 Dec 2019 23:42 )             22.4                         8.1    5.53  )-----------( 81       ( 29 Dec 2019 15:55 )             25.1                         7.4    5.37  )-----------( 77       ( 29 Dec 2019 07:08 )             22.4     29 Dec 2019 07:08    135    |  102    |  14     ----------------------------<  102    3.9     |  22     |  1.26   28 Dec 2019 07:04    139    |  103    |  18     ----------------------------<  137    4.0     |  22     |  1.23     Ca    8.5        29 Dec 2019 07:08  Ca    9.2        28 Dec 2019 07:04    TPro  4.9    /  Alb  2.3    /  TBili  9.2    /  DBili  x      /  AST  30     /  ALT  18     /  AlkPhos  137    29 Dec 2019 07:08  TPro  5.3    /  Alb  2.6    /  TBili  10.2   /  DBili  x      /  AST  33     /  ALT  18     /  AlkPhos  147    28 Dec 2019 07:04    PT/INR - ( 29 Dec 2019 07:08 )   PT: 24.9 sec;   INR: 2.12 ratio

## 2019-12-30 NOTE — PROGRESS NOTE ADULT - ASSESSMENT
cirrhosis  anemia   h/o duodenal ulcer    cont proton pump inhibitor  diet as tolerated  monitor cbc   cont lactulose/xifaxin  rocephin for sbp prophylaxis  plan for  upper gastrointestinal endoscopy today with hepatology

## 2019-12-30 NOTE — PROVIDER CONTACT NOTE (OTHER) - BACKGROUND
history of history of GIB--gerd--hepatic encephalopathy--obesity--fatty liver disease--renal stones--htn--neuropathy--diabetes

## 2019-12-30 NOTE — PROGRESS NOTE ADULT - SUBJECTIVE AND OBJECTIVE BOX
SUBJECTIVE / OVERNIGHT EVENTS: pt seen and examined    MEDICATIONS  (STANDING):  cefTRIAXone   IVPB 1000 milliGRAM(s) IV Intermittent every 24 hours  dextrose 5%. 1000 milliLiter(s) (50 mL/Hr) IV Continuous <Continuous>  dextrose 50% Injectable 12.5 Gram(s) IV Push once  dextrose 50% Injectable 25 Gram(s) IV Push once  dextrose 50% Injectable 25 Gram(s) IV Push once  insulin glargine Injectable (LANTUS) 28 Unit(s) SubCutaneous at bedtime  insulin lispro (HumaLOG) corrective regimen sliding scale   SubCutaneous three times a day before meals  insulin lispro (HumaLOG) corrective regimen sliding scale   SubCutaneous at bedtime  insulin lispro Injectable (HumaLOG) 8 Unit(s) SubCutaneous three times a day before meals  lactulose Syrup 20 Gram(s) Oral every 6 hours  midodrine. 20 milliGRAM(s) Oral three times a day  pantoprazole Infusion 8 mG/Hr (10 mL/Hr) IV Continuous <Continuous>  rifAXIMin 550 milliGRAM(s) Oral two times a day  sucralfate 1 Gram(s) Oral two times a day  tamsulosin 0.4 milliGRAM(s) Oral at bedtime    MEDICATIONS  (PRN):  dextrose 40% Gel 15 Gram(s) Oral once PRN Blood Glucose LESS THAN 70 milliGRAM(s)/deciliter  glucagon  Injectable 1 milliGRAM(s) IntraMuscular once PRN Glucose LESS THAN 70 milligrams/deciliter    Vital Signs Last 24 Hrs  T(C): 37.1 (30 Dec 2019 20:48), Max: 37.1 (30 Dec 2019 20:48)  T(F): 98.7 (30 Dec 2019 20:48), Max: 98.7 (30 Dec 2019 20:48)  HR: 93 (30 Dec 2019 20:48) (79 - 93)  BP: 129/55 (30 Dec 2019 20:48) (104/61 - 129/55)  BP(mean): --  RR: 18 (30 Dec 2019 20:48) (17 - 18)  SpO2: 97% (30 Dec 2019 20:48) (94% - 97%)    Constitutional: No fever, fatigue  Skin: No rash.  Eyes: No recent vision problems or eye pain.  ENT: No congestion, ear pain, or sore throat.  Cardiovascular: No chest pain or palpation.  Respiratory: No cough, shortness of breath, congestion, or wheezing.  Gastrointestinal: No abdominal pain, nausea, vomiting, or diarrhea.  Genitourinary: No dysuria.  Musculoskeletal: No joint swelling.  Neurologic: No headache.    PHYSICAL EXAM:  GENERAL: NAD  EYES: EOMI, PERRLA  NECK: Supple, No JVD  CHEST/LUNG: dec breath sounds rt base  HEART:  S1 , S2 +  ABDOMEN: soft , bs+, mild distension+  EXTREMITIES:  trace edema  NEUROLOGY: alert awake oriented x place, person    LABS:  12-30    139  |  104  |  14  ----------------------------<  101<H>  4.0   |  25  |  1.12    Ca    8.7      30 Dec 2019 07:11    TPro  5.0<L>  /  Alb  2.5<L>  /  TBili  10.1<H>  /  DBili      /  AST  29  /  ALT  17  /  AlkPhos  123<H>  12-30    Creatinine Trend: 1.12 <--, 1.26 <--, 1.23 <--, 1.17 <--, 1.18 <--                        7.6    5.62  )-----------( 74       ( 30 Dec 2019 22:50 )             23.9     Urine Studies:            LIVER FUNCTIONS - ( 30 Dec 2019 07:11 )  Alb: 2.5 g/dL / Pro: 5.0 g/dL / ALK PHOS: 123 U/L / ALT: 17 U/L / AST: 29 U/L / GGT: x           PT/INR - ( 30 Dec 2019 08:37 )   PT: 24.9 sec;   INR: 2.13 ratio         PTT - ( 30 Dec 2019 08:37 )  PTT:40.8 sec    LIVER FUNCTIONS - ( 29 Dec 2019 07:08 )  Alb: 2.3 g/dL / Pro: 4.9 g/dL / ALK PHOS: 137 U/L / ALT: 18 U/L / AST: 30 U/L / GGT: x           PT/INR - ( 29 Dec 2019 07:08 )   PT: 24.9 sec;   INR: 2.12 ratio           RADIOLOGY & ADDITIONAL TESTS:    Imaging Personally Reviewed:    Consultant(s) Notes Reviewed:      Care Discussed with Consultants/Other Providers:

## 2019-12-30 NOTE — PROGRESS NOTE ADULT - ASSESSMENT
63 year old male with HTN, DM2, non-alcoholic cirrhosis, who was recently admitted with altered mental status, hyponatremia and high ammonia level. We last saw him during a hospitalization in 10/2019, during which he was significantly volume overloaded and required iv diuresis.  He is currently being evaluated for liver transplant.  He returns 2 days ago with acute blood loss anemia. CT with trace pleural effusions, anasarca, and atherosclerotic calcifications.    Abnormal EKG  - His EKGs have demonstrated prolonged qtc in the past  - repeat ekg shows normal QT  - avoid qtc prolonging medications and replete K to greater than 4 and mg>2     Diastolic HF   - no significant volume overload on exam. CXR is clear and CT with only trace effusions  - watch volume with PRBC transfusions  - keep diuretics on hold.   - TTE normal LV systolic function EF 65%; LVH, DD Grade I  - cath with normal filling pressures and no significant cad.  - no asa in setting of acute blood loss anemia.    HTN  - BP on softer side  - hold diuretics and anti-hypertensives  - midodrine as needed    Anemia  - transfuse to keep Hb >8  - GI follow up  - No cardiac contraindication to proceeding with EGD today.  Routine cardiac monitoring is recommended.  - All other workup as per primary team

## 2019-12-31 LAB
ALBUMIN SERPL ELPH-MCNC: 2.5 G/DL — LOW (ref 3.3–5)
ALP SERPL-CCNC: 118 U/L — SIGNIFICANT CHANGE UP (ref 40–120)
ALT FLD-CCNC: 18 U/L — SIGNIFICANT CHANGE UP (ref 10–45)
ANION GAP SERPL CALC-SCNC: 11 MMOL/L — SIGNIFICANT CHANGE UP (ref 5–17)
APTT BLD: 42.1 SEC — HIGH (ref 27.5–36.3)
AST SERPL-CCNC: 29 U/L — SIGNIFICANT CHANGE UP (ref 10–40)
BILIRUB SERPL-MCNC: 9.5 MG/DL — HIGH (ref 0.2–1.2)
BUN SERPL-MCNC: 16 MG/DL — SIGNIFICANT CHANGE UP (ref 7–23)
CALCIUM SERPL-MCNC: 8.6 MG/DL — SIGNIFICANT CHANGE UP (ref 8.4–10.5)
CHLORIDE SERPL-SCNC: 102 MMOL/L — SIGNIFICANT CHANGE UP (ref 96–108)
CO2 SERPL-SCNC: 23 MMOL/L — SIGNIFICANT CHANGE UP (ref 22–31)
CREAT SERPL-MCNC: 1.04 MG/DL — SIGNIFICANT CHANGE UP (ref 0.5–1.3)
GLUCOSE BLDC GLUCOMTR-MCNC: 128 MG/DL — HIGH (ref 70–99)
GLUCOSE BLDC GLUCOMTR-MCNC: 169 MG/DL — HIGH (ref 70–99)
GLUCOSE BLDC GLUCOMTR-MCNC: 194 MG/DL — HIGH (ref 70–99)
GLUCOSE BLDC GLUCOMTR-MCNC: 233 MG/DL — HIGH (ref 70–99)
GLUCOSE SERPL-MCNC: 157 MG/DL — HIGH (ref 70–99)
HCT VFR BLD CALC: 21.8 % — LOW (ref 39–50)
HGB BLD-MCNC: 7.3 G/DL — LOW (ref 13–17)
INR BLD: 2.15 RATIO — HIGH (ref 0.88–1.16)
MCHC RBC-ENTMCNC: 33.5 GM/DL — SIGNIFICANT CHANGE UP (ref 32–36)
MCHC RBC-ENTMCNC: 33.6 PG — SIGNIFICANT CHANGE UP (ref 27–34)
MCV RBC AUTO: 100.5 FL — HIGH (ref 80–100)
PLATELET # BLD AUTO: 71 K/UL — LOW (ref 150–400)
POTASSIUM SERPL-MCNC: 3.7 MMOL/L — SIGNIFICANT CHANGE UP (ref 3.5–5.3)
POTASSIUM SERPL-SCNC: 3.7 MMOL/L — SIGNIFICANT CHANGE UP (ref 3.5–5.3)
PROT SERPL-MCNC: 4.9 G/DL — LOW (ref 6–8.3)
PROTHROM AB SERPL-ACNC: 24.9 SEC — HIGH (ref 10–13.1)
RBC # BLD: 2.17 M/UL — LOW (ref 4.2–5.8)
RBC # FLD: 24.1 % — HIGH (ref 10.3–14.5)
SODIUM SERPL-SCNC: 136 MMOL/L — SIGNIFICANT CHANGE UP (ref 135–145)
WBC # BLD: 4.9 K/UL — SIGNIFICANT CHANGE UP (ref 3.8–10.5)
WBC # FLD AUTO: 4.9 K/UL — SIGNIFICANT CHANGE UP (ref 3.8–10.5)

## 2019-12-31 PROCEDURE — 73700 CT LOWER EXTREMITY W/O DYE: CPT | Mod: 26,LT

## 2019-12-31 PROCEDURE — 73552 X-RAY EXAM OF FEMUR 2/>: CPT | Mod: 26,LT

## 2019-12-31 PROCEDURE — 99232 SBSQ HOSP IP/OBS MODERATE 35: CPT | Mod: GC

## 2019-12-31 PROCEDURE — 99232 SBSQ HOSP IP/OBS MODERATE 35: CPT

## 2019-12-31 RX ORDER — FOLIC ACID 0.8 MG
1 TABLET ORAL DAILY
Refills: 0 | Status: DISCONTINUED | OUTPATIENT
Start: 2019-12-31 | End: 2020-01-10

## 2019-12-31 RX ORDER — FERROUS SULFATE 325(65) MG
325 TABLET ORAL DAILY
Refills: 0 | Status: DISCONTINUED | OUTPATIENT
Start: 2019-12-31 | End: 2020-01-10

## 2019-12-31 RX ORDER — LACTULOSE 10 G/15ML
20 SOLUTION ORAL THREE TIMES A DAY
Refills: 0 | Status: DISCONTINUED | OUTPATIENT
Start: 2019-12-31 | End: 2020-01-10

## 2019-12-31 RX ORDER — PANTOPRAZOLE SODIUM 20 MG/1
40 TABLET, DELAYED RELEASE ORAL
Refills: 0 | Status: DISCONTINUED | OUTPATIENT
Start: 2019-12-31 | End: 2020-01-10

## 2019-12-31 RX ADMIN — Medication 1: at 12:27

## 2019-12-31 RX ADMIN — Medication 1: at 17:23

## 2019-12-31 RX ADMIN — CEFTRIAXONE 100 MILLIGRAM(S): 500 INJECTION, POWDER, FOR SOLUTION INTRAMUSCULAR; INTRAVENOUS at 05:47

## 2019-12-31 RX ADMIN — TAMSULOSIN HYDROCHLORIDE 0.4 MILLIGRAM(S): 0.4 CAPSULE ORAL at 21:51

## 2019-12-31 RX ADMIN — PANTOPRAZOLE SODIUM 40 MILLIGRAM(S): 20 TABLET, DELAYED RELEASE ORAL at 17:23

## 2019-12-31 RX ADMIN — Medication 1 GRAM(S): at 05:45

## 2019-12-31 RX ADMIN — LACTULOSE 20 GRAM(S): 10 SOLUTION ORAL at 05:45

## 2019-12-31 RX ADMIN — MIDODRINE HYDROCHLORIDE 20 MILLIGRAM(S): 2.5 TABLET ORAL at 12:27

## 2019-12-31 RX ADMIN — MIDODRINE HYDROCHLORIDE 20 MILLIGRAM(S): 2.5 TABLET ORAL at 05:45

## 2019-12-31 RX ADMIN — LACTULOSE 20 GRAM(S): 10 SOLUTION ORAL at 12:27

## 2019-12-31 RX ADMIN — Medication 8 UNIT(S): at 08:38

## 2019-12-31 RX ADMIN — Medication 8 UNIT(S): at 12:28

## 2019-12-31 RX ADMIN — PANTOPRAZOLE SODIUM 10 MG/HR: 20 TABLET, DELAYED RELEASE ORAL at 21:52

## 2019-12-31 RX ADMIN — LACTULOSE 20 GRAM(S): 10 SOLUTION ORAL at 21:51

## 2019-12-31 RX ADMIN — Medication 8 UNIT(S): at 17:23

## 2019-12-31 RX ADMIN — Medication 1 MILLIGRAM(S): at 17:23

## 2019-12-31 RX ADMIN — Medication 325 MILLIGRAM(S): at 17:23

## 2019-12-31 RX ADMIN — MIDODRINE HYDROCHLORIDE 20 MILLIGRAM(S): 2.5 TABLET ORAL at 17:23

## 2019-12-31 RX ADMIN — INSULIN GLARGINE 28 UNIT(S): 100 INJECTION, SOLUTION SUBCUTANEOUS at 21:52

## 2019-12-31 NOTE — PROGRESS NOTE ADULT - ASSESSMENT
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 Treated with argon plasma coagulation (APC)  trendy h/h daily, transfuse prn  f/u with hepatology and liver transplant team

## 2019-12-31 NOTE — PROGRESS NOTE ADULT - ASSESSMENT
Impression:  65 yo M with IDDM, dyslipidemia, obesity, GERD, HFpEF with mild LV diastolic dysfunction, and decompensated JEAN cirrhosis complicated by ascites, history of SBP, hepatic encephalopathy, and chronic anemia with a history of duodenal ulcer as well as GAVE and duodenal AVM s/p APC (last on 10/11/19), who is UNOS listed for liver transplantation at [facility]. He was re-admitted from subacute rehab on 12/26/19 due to acute on chronic anemia without overt GI bleeding, also with hepatic encephalopathy.    # Left leg swelling and pain      - Left thigh significantly swollen compared to right, need to rule out acute DVT  #Acute on chronic anemia.  Baseline apprx 8, came in <6.  Possibly with hematemesis but no overt GI bleeding while here     - s/p EGD this morning with mild GAVE s/p APC treatment     - Hgb stable   # Decompensated JEAN cirrhosis, MELD-Na 25 on 12/31     - varices: grade I <5mm varices on EGD 12/30 not amenable to banding; also mild GAVE s/p APC      - SPE: none currently but waxes and wanes.  On lactulose and Xifaxan     - ascites: trace, not on diuretics     - HCC: none on CT this admission     - Transplant: Blood Type A positive, UNOS listed with updated MELD-Na of 27    Recommendations:  - please obtain Xrays of left thigh (pelvis to knee) to rule out fracture, and CT left thigh (pelvis to knee) to evaluate soft tissue given persistent pain, swelling, and inability to walk on this leg  -  PPI BID for GAVE  - ok to stop ceftriaxone given no variceal bleeding  - lactulose TID and Xifaxan BID for HE   - monitor and record number of BMs daily  - folate, iron supplementation   - nutrition consultation to optimize nutritional status.  Low sodium diet.  Glucerna (chocolate) 3 times daily  - PT consultation (patient needs aggressive PT pre-transplant)  - daily CBC, CMP, INR

## 2019-12-31 NOTE — CONSULT NOTE ADULT - SUBJECTIVE AND OBJECTIVE BOX
Recent admission for decompensated JEAN cirrhosis 2 weeks prior  While in rehab, increased swelling and pain left leg leading to near fall (caught himself on rails and let himself down)  Also decreased hemoglobin  No recent encephalopathy, abdominal pain/swelling, fevers    O/E  oriented x3  no flap  abdo soft non-tender, no ascites  legs: bilateral edema, Left >> Right  bruising around left thigh posteriorly  tender left thigh  No distal weakness/altered sensation    US legs: no DVT  Upper GI endoscopy: portal gastropathy, no active bleeding    INR 2.1

## 2019-12-31 NOTE — PROGRESS NOTE ADULT - SUBJECTIVE AND OBJECTIVE BOX
SUBJECTIVE / OVERNIGHT EVENTS: pt seen and examined    MEDICATIONS  (STANDING):  dextrose 5%. 1000 milliLiter(s) (50 mL/Hr) IV Continuous <Continuous>  dextrose 50% Injectable 12.5 Gram(s) IV Push once  dextrose 50% Injectable 25 Gram(s) IV Push once  dextrose 50% Injectable 25 Gram(s) IV Push once  ferrous    sulfate 325 milliGRAM(s) Oral daily  folic acid 1 milliGRAM(s) Oral daily  insulin glargine Injectable (LANTUS) 28 Unit(s) SubCutaneous at bedtime  insulin lispro (HumaLOG) corrective regimen sliding scale   SubCutaneous three times a day before meals  insulin lispro (HumaLOG) corrective regimen sliding scale   SubCutaneous at bedtime  insulin lispro Injectable (HumaLOG) 8 Unit(s) SubCutaneous three times a day before meals  lactulose Syrup 20 Gram(s) Oral three times a day  midodrine. 20 milliGRAM(s) Oral three times a day  pantoprazole    Tablet 40 milliGRAM(s) Oral two times a day  pantoprazole Infusion 8 mG/Hr (10 mL/Hr) IV Continuous <Continuous>  rifAXIMin 550 milliGRAM(s) Oral two times a day  tamsulosin 0.4 milliGRAM(s) Oral at bedtime    MEDICATIONS  (PRN):  dextrose 40% Gel 15 Gram(s) Oral once PRN Blood Glucose LESS THAN 70 milliGRAM(s)/deciliter  glucagon  Injectable 1 milliGRAM(s) IntraMuscular once PRN Glucose LESS THAN 70 milligrams/deciliter    Vital Signs Last 24 Hrs  T(C): 36.9 (31 Dec 2019 12:27), Max: 37.1 (30 Dec 2019 20:48)  T(F): 98.4 (31 Dec 2019 12:27), Max: 98.7 (30 Dec 2019 20:48)  HR: 97 (31 Dec 2019 12:27) (87 - 97)  BP: 100/57 (31 Dec 2019 12:27) (100/54 - 129/55)  BP(mean): --  RR: 18 (31 Dec 2019 12:27) (17 - 18)  SpO2: 97% (31 Dec 2019 12:27) (97% - 98%)    Constitutional: No fever, fatigue  Skin: No rash.  Eyes: No recent vision problems or eye pain.  ENT: No congestion, ear pain, or sore throat.  Cardiovascular: No chest pain or palpation.  Respiratory: No cough, shortness of breath, congestion, or wheezing.  Gastrointestinal: No abdominal pain, nausea, vomiting, or diarrhea.  Genitourinary: No dysuria.  Musculoskeletal: No joint swelling.  Neurologic: No headache.    PHYSICAL EXAM:  GENERAL: NAD  EYES: EOMI, PERRLA  NECK: Supple, No JVD  CHEST/LUNG: dec breath sounds rt base  HEART:  S1 , S2 +  ABDOMEN: soft , bs+, mild distension+  EXTREMITIES: left > rt edema+  NEUROLOGY: alert awake oriented x place, person    LABS:  12-31    136  |  102  |  16  ----------------------------<  157<H>  3.7   |  23  |  1.04    Ca    8.6      31 Dec 2019 06:35    TPro  4.9<L>  /  Alb  2.5<L>  /  TBili  9.5<H>  /  DBili      /  AST  29  /  ALT  18  /  AlkPhos  118  12-31    Creatinine Trend: 1.04 <--, 1.12 <--, 1.26 <--, 1.23 <--, 1.17 <--, 1.18 <--                        7.3    4.90  )-----------( 71       ( 31 Dec 2019 08:56 )             21.8     Urine Studies:            LIVER FUNCTIONS - ( 31 Dec 2019 06:35 )  Alb: 2.5 g/dL / Pro: 4.9 g/dL / ALK PHOS: 118 U/L / ALT: 18 U/L / AST: 29 U/L / GGT: x           PT/INR - ( 31 Dec 2019 09:17 )   PT: 24.9 sec;   INR: 2.15 ratio         PTT - ( 31 Dec 2019 09:17 )  PTT:42.1 sec            LIVER FUNCTIONS - ( 30 Dec 2019 07:11 )  Alb: 2.5 g/dL / Pro: 5.0 g/dL / ALK PHOS: 123 U/L / ALT: 17 U/L / AST: 29 U/L / GGT: x           PT/INR - ( 30 Dec 2019 08:37 )   PT: 24.9 sec;   INR: 2.13 ratio         PTT - ( 30 Dec 2019 08:37 )  PTT:40.8 sec    LIVER FUNCTIONS - ( 29 Dec 2019 07:08 )  Alb: 2.3 g/dL / Pro: 4.9 g/dL / ALK PHOS: 137 U/L / ALT: 18 U/L / AST: 30 U/L / GGT: x           PT/INR - ( 29 Dec 2019 07:08 )   PT: 24.9 sec;   INR: 2.12 ratio           RADIOLOGY & ADDITIONAL TESTS:    Imaging Personally Reviewed:    Consultant(s) Notes Reviewed:      Care Discussed with Consultants/Other Providers:

## 2019-12-31 NOTE — PROGRESS NOTE ADULT - SUBJECTIVE AND OBJECTIVE BOX
Chief Complaint:  Patient is a 64y old  Male who presents with a chief complaint of low Hgb (31 Dec 2019 10:40)      Interval Events:     Allergies:  codeine (Anaphylaxis)  NO  RED MEAT (Unknown)      Hospital Medications:  cefTRIAXone   IVPB 1000 milliGRAM(s) IV Intermittent every 24 hours  dextrose 40% Gel 15 Gram(s) Oral once PRN  dextrose 5%. 1000 milliLiter(s) IV Continuous <Continuous>  dextrose 50% Injectable 12.5 Gram(s) IV Push once  dextrose 50% Injectable 25 Gram(s) IV Push once  dextrose 50% Injectable 25 Gram(s) IV Push once  glucagon  Injectable 1 milliGRAM(s) IntraMuscular once PRN  insulin glargine Injectable (LANTUS) 28 Unit(s) SubCutaneous at bedtime  insulin lispro (HumaLOG) corrective regimen sliding scale   SubCutaneous three times a day before meals  insulin lispro (HumaLOG) corrective regimen sliding scale   SubCutaneous at bedtime  insulin lispro Injectable (HumaLOG) 8 Unit(s) SubCutaneous three times a day before meals  lactulose Syrup 20 Gram(s) Oral every 6 hours  midodrine. 20 milliGRAM(s) Oral three times a day  pantoprazole Infusion 8 mG/Hr IV Continuous <Continuous>  rifAXIMin 550 milliGRAM(s) Oral two times a day  sucralfate 1 Gram(s) Oral two times a day  tamsulosin 0.4 milliGRAM(s) Oral at bedtime      PMHX/PSHX:  GIB (gastrointestinal bleeding)  GERD with esophagitis  Hepatic encephalopathy  Obesity  Fatty liver disease, nonalcoholic  Renal stones  Hypertension  Neuropathy  Hypercholesteremia  Diabetes  S/P cholecystectomy  No significant past surgical history      Family history:  Family history of type 2 diabetes mellitus  Family history of hypertension  Family history of stomach cancer  No pertinent family history in first degree relatives      ROS:     General:  No wt loss, fevers, chills, night sweats, fatigue,   Eyes:  Good vision, no reported pain  ENT:  No sore throat, pain, runny nose, dysphagia  CV:  No pain, palpitations, hypo/hypertension  Resp:  No dyspnea, cough, tachypnea, wheezing  GI:  See HPI  :  No pain, bleeding, incontinence, nocturia  Muscle:  No pain, weakness  Neuro:  No weakness, tingling, memory problems  Psych:  No fatigue, insomnia, mood problems, depression  Endocrine:  No polyuria, polydipsia, cold/heat intolerance  Heme:  No petechiae, ecchymosis, easy bruisability  Skin:  No rash, edema      PHYSICAL EXAM:     GENERAL:  Appears stated age, well-groomed, well-nourished, no distress  HEENT:  NC/AT,  conjunctivae clear, sclera -anicteric  CHEST:  Full & symmetric excursion, no increased effort, breath sounds clear  HEART:  Regular rhythm, S1, S2, no murmur/rub/S3/S4,  no edema  ABDOMEN:  Soft, non-tender, non-distended, normoactive bowel sounds,  no masses ,no hepato-splenomegaly,   EXTREMITIES:  no cyanosis,clubbing or edema  SKIN:  No rash/erythema/ecchymoses/petechiae/wounds/abscess/warm/dry  NEURO:  Alert, oriented    Vital Signs:  Vital Signs Last 24 Hrs  T(C): 37 (31 Dec 2019 04:36), Max: 37.1 (30 Dec 2019 20:48)  T(F): 98.6 (31 Dec 2019 04:36), Max: 98.7 (30 Dec 2019 20:48)  HR: 87 (31 Dec 2019 09:50) (84 - 93)  BP: 100/54 (31 Dec 2019 09:50) (100/54 - 129/55)  BP(mean): --  RR: 17 (31 Dec 2019 04:36) (17 - 18)  SpO2: 97% (31 Dec 2019 09:50) (94% - 98%)  Daily     Daily Weight in k.2 (31 Dec 2019 08:09)    LABS:                        7.3    4.90  )-----------( 71       ( 31 Dec 2019 08:56 )             21.8         136  |  102  |  16  ----------------------------<  157<H>  3.7   |  23  |  1.04    Ca    8.6      31 Dec 2019 06:35    TPro  4.9<L>  /  Alb  2.5<L>  /  TBili  9.5<H>  /  DBili  x   /  AST  29  /  ALT  18  /  AlkPhos  118      LIVER FUNCTIONS - ( 31 Dec 2019 06:35 )  Alb: 2.5 g/dL / Pro: 4.9 g/dL / ALK PHOS: 118 U/L / ALT: 18 U/L / AST: 29 U/L / GGT: x           PT/INR - ( 31 Dec 2019 09:17 )   PT: 24.9 sec;   INR: 2.15 ratio         PTT - ( 31 Dec 2019 09:17 )  PTT:42.1 sec        Imaging:

## 2019-12-31 NOTE — PROGRESS NOTE ADULT - SUBJECTIVE AND OBJECTIVE BOX
[facility] - Sushila Dhaliwal, Doug, Morenita, Ayo, Colleen Williamson  Office Number:  [phone]    Patient resting comfortably in bed in NAD.  Laying flat with no respiratory distress.  No complaints of chest pain, dyspnea, palpitations, PND, or orthopnea.    ROS: negative unless otherwise mentioned.    Telemetry:  off    MEDICATIONS  (STANDING):  cefTRIAXone   IVPB 1000 milliGRAM(s) IV Intermittent every 24 hours  dextrose 5%. 1000 milliLiter(s) (50 mL/Hr) IV Continuous <Continuous>  dextrose 50% Injectable 12.5 Gram(s) IV Push once  dextrose 50% Injectable 25 Gram(s) IV Push once  dextrose 50% Injectable 25 Gram(s) IV Push once  insulin glargine Injectable (LANTUS) 28 Unit(s) SubCutaneous at bedtime  insulin lispro (HumaLOG) corrective regimen sliding scale   SubCutaneous three times a day before meals  insulin lispro (HumaLOG) corrective regimen sliding scale   SubCutaneous at bedtime  insulin lispro Injectable (HumaLOG) 8 Unit(s) SubCutaneous three times a day before meals  lactulose Syrup 20 Gram(s) Oral every 6 hours  midodrine. 20 milliGRAM(s) Oral three times a day  pantoprazole Infusion 8 mG/Hr (10 mL/Hr) IV Continuous <Continuous>  rifAXIMin 550 milliGRAM(s) Oral two times a day  sucralfate 1 Gram(s) Oral two times a day  tamsulosin 0.4 milliGRAM(s) Oral at bedtime    MEDICATIONS  (PRN):  dextrose 40% Gel 15 Gram(s) Oral once PRN Blood Glucose LESS THAN 70 milliGRAM(s)/deciliter  glucagon  Injectable 1 milliGRAM(s) IntraMuscular once PRN Glucose LESS THAN 70 milligrams/deciliter      Allergies    codeine (Anaphylaxis)    Intolerances    NO  RED MEAT (Unknown)      Vital Signs Last 24 Hrs  T(C): 37 (31 Dec 2019 04:36), Max: 37.1 (30 Dec 2019 20:48)  T(F): 98.6 (31 Dec 2019 04:36), Max: 98.7 (30 Dec 2019 20:48)  HR: 87 (31 Dec 2019 09:50) (80 - 93)  BP: 100/54 (31 Dec 2019 09:50) (100/54 - 129/55)  BP(mean): --  RR: 17 (31 Dec 2019 04:36) (17 - 18)  SpO2: 97% (31 Dec 2019 09:50) (94% - 98%)    I&O's Summary    30 Dec 2019 07:01  -  31 Dec 2019 07:00  --------------------------------------------------------  IN: 619 mL / OUT: 1250 mL / NET: -631 mL        ON EXAM:    Constitutional: NAD, alert and oriented x 3, obese  Lungs:  Non-labored, breath sounds are clear bilaterally, No wheezing, rales or rhonchi  Cardiovascular: RRR.  S1 and S2 positive.  No murmurs, rubs, gallops or clicks  Gastrointestinal: Bowel Sounds present, soft, nontender.   Lymph: mild peripheral edema. No cervical lymphadenopathy.  Neurological: Alert, no focal deficits  Skin: No rashes or ulcers   Psych:  Mood & affect appropriate.    LABS: All Labs Reviewed:                        7.3    4.90  )-----------( 71       ( 31 Dec 2019 08:56 )             21.8                         7.6    5.62  )-----------( 74       ( 30 Dec 2019 22:50 )             23.9                         7.9    4.95  )-----------( 74       ( 30 Dec 2019 14:07 )             23.7     31 Dec 2019 06:35    136    |  102    |  16     ----------------------------<  157    3.7     |  23     |  1.04   30 Dec 2019 07:11    139    |  104    |  14     ----------------------------<  101    4.0     |  25     |  1.12   29 Dec 2019 07:08    135    |  102    |  14     ----------------------------<  102    3.9     |  22     |  1.26     Ca    8.6        31 Dec 2019 06:35  Ca    8.7        30 Dec 2019 07:11  Ca    8.5        29 Dec 2019 07:08    TPro  4.9    /  Alb  2.5    /  TBili  9.5    /  DBili  x      /  AST  29     /  ALT  18     /  AlkPhos  118    31 Dec 2019 06:35  TPro  5.0    /  Alb  2.5    /  TBili  10.1   /  DBili  x      /  AST  29     /  ALT  17     /  AlkPhos  123    30 Dec 2019 07:11  TPro  4.9    /  Alb  2.3    /  TBili  9.2    /  DBili  x      /  AST  30     /  ALT  18     /  AlkPhos  137    29 Dec 2019 07:08    PT/INR - ( 31 Dec 2019 09:17 )   PT: 24.9 sec;   INR: 2.15 ratio         PTT - ( 31 Dec 2019 09:17 )  PTT:42.1 sec      Blood Culture:

## 2019-12-31 NOTE — PROGRESS NOTE ADULT - ASSESSMENT
63 year old male with HTN, DM2, non-alcoholic cirrhosis, who was recently admitted with altered mental status, hyponatremia and high ammonia level. We last saw him during a hospitalization in 10/2019, during which he was significantly volume overloaded and required iv diuresis.  He is currently being evaluated for liver transplant.  He returns last week with acute blood loss anemia. CT with trace pleural effusions, anasarca, and atherosclerotic calcifications.  EGD yesterday with mild gave s/p APC treatment    Abnormal EKG  - His EKGs have demonstrated prolonged qtc in the past  - repeat ekg shows normal QT  - avoid qtc prolonging medications and replete K to greater than 4 and mg>2     Diastolic HF   - no significant volume overload on exam. CXR is clear and CT with only trace effusions  - watch volume with PRBC transfusions  - consider restarting po lasix 20 mg daily.  - TTE normal LV systolic function EF 65%; LVH, DD Grade I  - cath with normal filling pressures and no significant cad.  - no asa in setting of acute blood loss anemia.    HTN  - BP on softer side  - hold diuretics and anti-hypertensives  - midodrine as needed    Anemia  - transfuse to keep Hb >8  - GI follow up    - All other workup as per primary team

## 2019-12-31 NOTE — PROGRESS NOTE ADULT - SUBJECTIVE AND OBJECTIVE BOX
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 Treated with argon plasma coagulation (APC)  - Normal examined duodenum    pt seen and examined  he denies abdominal pain, n/v  tolerating PO   + having brown stool per pt     MEDICATIONS  (STANDING):  cefTRIAXone   IVPB 1000 milliGRAM(s) IV Intermittent every 24 hours  dextrose 5%. 1000 milliLiter(s) (50 mL/Hr) IV Continuous <Continuous>  dextrose 50% Injectable 12.5 Gram(s) IV Push once  dextrose 50% Injectable 25 Gram(s) IV Push once  dextrose 50% Injectable 25 Gram(s) IV Push once  insulin glargine Injectable (LANTUS) 28 Unit(s) SubCutaneous at bedtime  insulin lispro (HumaLOG) corrective regimen sliding scale   SubCutaneous three times a day before meals  insulin lispro (HumaLOG) corrective regimen sliding scale   SubCutaneous at bedtime  insulin lispro Injectable (HumaLOG) 8 Unit(s) SubCutaneous three times a day before meals  lactulose Syrup 20 Gram(s) Oral every 6 hours  midodrine. 20 milliGRAM(s) Oral three times a day  pantoprazole Infusion 8 mG/Hr (10 mL/Hr) IV Continuous <Continuous>  rifAXIMin 550 milliGRAM(s) Oral two times a day  sucralfate 1 Gram(s) Oral two times a day  tamsulosin 0.4 milliGRAM(s) Oral at bedtime    MEDICATIONS  (PRN):  dextrose 40% Gel 15 Gram(s) Oral once PRN Blood Glucose LESS THAN 70 milliGRAM(s)/deciliter  glucagon  Injectable 1 milliGRAM(s) IntraMuscular once PRN Glucose LESS THAN 70 milligrams/deciliter      Allergies    codeine (Anaphylaxis)    Intolerances    NO  RED MEAT (Unknown)      Review of Systems:    General:  No wt loss, fevers, chills, night sweats,fatigue,   Eyes:  Good vision, no reported pain  ENT:  No sore throat, pain, runny nose, dysphagia  CV:  No pain, palpitations, hypo/hypertension  Resp:  No dyspnea, cough, tachypnea, wheezing  GI:  No pain, No nausea, No vomiting, No diarrhea, No constipation, No weight loss, No fever, No pruritis, No rectal bleeding, No melena, No dysphagia  :  No pain, bleeding, incontinence, nocturia  Muscle:  No pain, weakness  Neuro:  No weakness, tingling, memory problems  Psych:  No fatigue, insomnia, mood problems, depression  Endocrine:  No polyuria, polydypsia, cold/heat intolerance  Heme:  No petechiae, ecchymosis, easy bruisability  Skin:  No rash, tattoos, scars, edema      Vital Signs Last 24 Hrs  T(C): 37 (31 Dec 2019 04:36), Max: 37.1 (30 Dec 2019 20:48)  T(F): 98.6 (31 Dec 2019 04:36), Max: 98.7 (30 Dec 2019 20:48)  HR: 87 (31 Dec 2019 09:50) (84 - 93)  BP: 100/54 (31 Dec 2019 09:50) (100/54 - 129/55)  BP(mean): --  RR: 17 (31 Dec 2019 04:36) (17 - 18)  SpO2: 97% (31 Dec 2019 09:50) (94% - 98%)    PHYSICAL EXAM:    Constitutional: NAD, well-developed  HEENT: EOMI, throat clear  Neck: No LAD, supple  Respiratory: CTA and P  Cardiovascular: S1 and S2, RRR, no M  Gastrointestinal: BS+, soft, NT/ND, neg HSM,  Extremities: No peripheral edema, neg clubing, cyanosis  Vascular: 2+ peripheral pulses  Neurological: A/O x 3, no focal deficits  Psychiatric: Normal mood, normal affect  Skin: No rashes      LABS:                        7.3    4.90  )-----------( 71       ( 31 Dec 2019 08:56 )             21.8     12-31    136  |  102  |  16  ----------------------------<  157<H>  3.7   |  23  |  1.04    Ca    8.6      31 Dec 2019 06:35    TPro  4.9<L>  /  Alb  2.5<L>  /  TBili  9.5<H>  /  DBili  x   /  AST  29  /  ALT  18  /  AlkPhos  118  12-31    PT/INR - ( 31 Dec 2019 09:17 )   PT: 24.9 sec;   INR: 2.15 ratio         PTT - ( 31 Dec 2019 09:17 )  PTT:42.1 sec      RADIOLOGY & ADDITIONAL TESTS:

## 2019-12-31 NOTE — CONSULT NOTE ADULT - ASSESSMENT
Impression:  left thigh hematoma leading to anemia and left leg swelling    This is usually self-limited but important to ensure Hb stability and also monitor for compartment syndrome (currently not present), if bleeding continues      Suggest:  Can do CT of thighs to confirm diagnosis and r/o active bleeding (CT angio)  Monitor pain / paresthesia / weakness of left thigh / leg

## 2020-01-01 LAB
ALBUMIN SERPL ELPH-MCNC: 2.5 G/DL — LOW (ref 3.3–5)
ALP SERPL-CCNC: 180 U/L — HIGH (ref 40–120)
ALT FLD-CCNC: 17 U/L — SIGNIFICANT CHANGE UP (ref 10–45)
ANION GAP SERPL CALC-SCNC: 10 MMOL/L — SIGNIFICANT CHANGE UP (ref 5–17)
AST SERPL-CCNC: 29 U/L — SIGNIFICANT CHANGE UP (ref 10–40)
BILIRUB SERPL-MCNC: 8.7 MG/DL — HIGH (ref 0.2–1.2)
BUN SERPL-MCNC: 17 MG/DL — SIGNIFICANT CHANGE UP (ref 7–23)
CALCIUM SERPL-MCNC: 8.6 MG/DL — SIGNIFICANT CHANGE UP (ref 8.4–10.5)
CHLORIDE SERPL-SCNC: 102 MMOL/L — SIGNIFICANT CHANGE UP (ref 96–108)
CO2 SERPL-SCNC: 23 MMOL/L — SIGNIFICANT CHANGE UP (ref 22–31)
CREAT SERPL-MCNC: 1.11 MG/DL — SIGNIFICANT CHANGE UP (ref 0.5–1.3)
GLUCOSE BLDC GLUCOMTR-MCNC: 138 MG/DL — HIGH (ref 70–99)
GLUCOSE BLDC GLUCOMTR-MCNC: 145 MG/DL — HIGH (ref 70–99)
GLUCOSE BLDC GLUCOMTR-MCNC: 173 MG/DL — HIGH (ref 70–99)
GLUCOSE BLDC GLUCOMTR-MCNC: 185 MG/DL — HIGH (ref 70–99)
GLUCOSE BLDC GLUCOMTR-MCNC: 198 MG/DL — HIGH (ref 70–99)
GLUCOSE SERPL-MCNC: 175 MG/DL — HIGH (ref 70–99)
HCT VFR BLD CALC: 21.6 % — LOW (ref 39–50)
HGB BLD-MCNC: 7.1 G/DL — LOW (ref 13–17)
INR BLD: 2.02 RATIO — HIGH (ref 0.88–1.16)
MCHC RBC-ENTMCNC: 32.9 GM/DL — SIGNIFICANT CHANGE UP (ref 32–36)
MCHC RBC-ENTMCNC: 33.6 PG — SIGNIFICANT CHANGE UP (ref 27–34)
MCV RBC AUTO: 102.4 FL — HIGH (ref 80–100)
PLATELET # BLD AUTO: 73 K/UL — LOW (ref 150–400)
POTASSIUM SERPL-MCNC: 4 MMOL/L — SIGNIFICANT CHANGE UP (ref 3.5–5.3)
POTASSIUM SERPL-SCNC: 4 MMOL/L — SIGNIFICANT CHANGE UP (ref 3.5–5.3)
PROT SERPL-MCNC: 5.1 G/DL — LOW (ref 6–8.3)
PROTHROM AB SERPL-ACNC: 23.7 SEC — HIGH (ref 10–13.1)
RBC # BLD: 2.11 M/UL — LOW (ref 4.2–5.8)
RBC # FLD: 24.2 % — HIGH (ref 10.3–14.5)
SODIUM SERPL-SCNC: 135 MMOL/L — SIGNIFICANT CHANGE UP (ref 135–145)
WBC # BLD: 5.1 K/UL — SIGNIFICANT CHANGE UP (ref 3.8–10.5)
WBC # FLD AUTO: 5.1 K/UL — SIGNIFICANT CHANGE UP (ref 3.8–10.5)

## 2020-01-01 PROCEDURE — 99232 SBSQ HOSP IP/OBS MODERATE 35: CPT | Mod: GC

## 2020-01-01 PROCEDURE — 93971 EXTREMITY STUDY: CPT | Mod: 26

## 2020-01-01 PROCEDURE — 99232 SBSQ HOSP IP/OBS MODERATE 35: CPT

## 2020-01-01 RX ADMIN — PANTOPRAZOLE SODIUM 10 MG/HR: 20 TABLET, DELAYED RELEASE ORAL at 21:13

## 2020-01-01 RX ADMIN — TAMSULOSIN HYDROCHLORIDE 0.4 MILLIGRAM(S): 0.4 CAPSULE ORAL at 21:13

## 2020-01-01 RX ADMIN — Medication 1 MILLIGRAM(S): at 11:57

## 2020-01-01 RX ADMIN — Medication 8 UNIT(S): at 09:22

## 2020-01-01 RX ADMIN — LACTULOSE 20 GRAM(S): 10 SOLUTION ORAL at 21:13

## 2020-01-01 RX ADMIN — LACTULOSE 20 GRAM(S): 10 SOLUTION ORAL at 05:57

## 2020-01-01 RX ADMIN — Medication 8 UNIT(S): at 17:28

## 2020-01-01 RX ADMIN — Medication 325 MILLIGRAM(S): at 11:57

## 2020-01-01 RX ADMIN — Medication 8 UNIT(S): at 13:38

## 2020-01-01 RX ADMIN — MIDODRINE HYDROCHLORIDE 20 MILLIGRAM(S): 2.5 TABLET ORAL at 17:16

## 2020-01-01 RX ADMIN — PANTOPRAZOLE SODIUM 40 MILLIGRAM(S): 20 TABLET, DELAYED RELEASE ORAL at 05:57

## 2020-01-01 RX ADMIN — INSULIN GLARGINE 28 UNIT(S): 100 INJECTION, SOLUTION SUBCUTANEOUS at 22:52

## 2020-01-01 RX ADMIN — PANTOPRAZOLE SODIUM 10 MG/HR: 20 TABLET, DELAYED RELEASE ORAL at 17:14

## 2020-01-01 RX ADMIN — Medication 1: at 13:37

## 2020-01-01 RX ADMIN — PANTOPRAZOLE SODIUM 40 MILLIGRAM(S): 20 TABLET, DELAYED RELEASE ORAL at 17:15

## 2020-01-01 RX ADMIN — LACTULOSE 20 GRAM(S): 10 SOLUTION ORAL at 13:37

## 2020-01-01 RX ADMIN — MIDODRINE HYDROCHLORIDE 20 MILLIGRAM(S): 2.5 TABLET ORAL at 11:57

## 2020-01-01 RX ADMIN — MIDODRINE HYDROCHLORIDE 20 MILLIGRAM(S): 2.5 TABLET ORAL at 05:57

## 2020-01-01 NOTE — PROGRESS NOTE ADULT - SUBJECTIVE AND OBJECTIVE BOX
[facility] - Sushila Dhaliwal, Doug, Morenita, Ayo, Colleen Williamson  Office Number:  [phone]    Patient resting comfortably in bed in NAD.  Laying flat with no respiratory distress.  No complaints of chest pain, dyspnea, palpitations, PND, or orthopnea.  He reports that his LE edema is increasing.    F/U for:  Edema      MEDICATIONS  (STANDING):  dextrose 5%. 1000 milliLiter(s) (50 mL/Hr) IV Continuous <Continuous>  dextrose 50% Injectable 12.5 Gram(s) IV Push once  dextrose 50% Injectable 25 Gram(s) IV Push once  dextrose 50% Injectable 25 Gram(s) IV Push once  ferrous    sulfate 325 milliGRAM(s) Oral daily  folic acid 1 milliGRAM(s) Oral daily  insulin glargine Injectable (LANTUS) 28 Unit(s) SubCutaneous at bedtime  insulin lispro (HumaLOG) corrective regimen sliding scale   SubCutaneous three times a day before meals  insulin lispro (HumaLOG) corrective regimen sliding scale   SubCutaneous at bedtime  insulin lispro Injectable (HumaLOG) 8 Unit(s) SubCutaneous three times a day before meals  lactulose Syrup 20 Gram(s) Oral three times a day  midodrine. 20 milliGRAM(s) Oral three times a day  pantoprazole    Tablet 40 milliGRAM(s) Oral two times a day  pantoprazole Infusion 8 mG/Hr (10 mL/Hr) IV Continuous <Continuous>  rifAXIMin 550 milliGRAM(s) Oral two times a day  tamsulosin 0.4 milliGRAM(s) Oral at bedtime    MEDICATIONS  (PRN):  dextrose 40% Gel 15 Gram(s) Oral once PRN Blood Glucose LESS THAN 70 milliGRAM(s)/deciliter  glucagon  Injectable 1 milliGRAM(s) IntraMuscular once PRN Glucose LESS THAN 70 milligrams/deciliter      Allergies    codeine (Anaphylaxis)    Intolerances    NO  RED MEAT (Unknown)      Vital Signs Last 24 Hrs  T(C): 36.7 (01 Jan 2020 04:49), Max: 36.9 (31 Dec 2019 12:27)  T(F): 98.1 (01 Jan 2020 04:49), Max: 98.4 (31 Dec 2019 12:27)  HR: 90 (01 Jan 2020 04:49) (87 - 97)  BP: 111/62 (01 Jan 2020 04:49) (100/54 - 111/62)  BP(mean): --  RR: 18 (01 Jan 2020 04:49) (18 - 18)  SpO2: 97% (01 Jan 2020 04:49) (96% - 97%)    I&O's Summary    31 Dec 2019 07:01  -  01 Jan 2020 07:00  --------------------------------------------------------  IN: 692 mL / OUT: 410 mL / NET: 282 mL        ON EXAM:    Constitutional: NAD, alert and oriented x 3, obese  Lungs:  Non-labored, breath sounds are clear bilaterally, No wheezing, rales or rhonchi  Cardiovascular: RRR.  S1 and S2 positive.  No murmurs, rubs, gallops or clicks  Gastrointestinal: Bowel Sounds present, soft, nontender.   Lymph: mild peripheral edema. No cervical lymphadenopathy.  Neurological: Alert, no focal deficits  Skin: No rashes or ulcers   Psych:  Mood & affect appropriate.    LABS: All Labs Reviewed:                        7.3    4.90  )-----------( 71       ( 31 Dec 2019 08:56 )             21.8                         7.6    5.62  )-----------( 74       ( 30 Dec 2019 22:50 )             23.9                         7.9    4.95  )-----------( 74       ( 30 Dec 2019 14:07 )             23.7     01 Jan 2020 07:22    135    |  102    |  17     ----------------------------<  175    4.0     |  23     |  1.11   31 Dec 2019 06:35    136    |  102    |  16     ----------------------------<  157    3.7     |  23     |  1.04   30 Dec 2019 07:11    139    |  104    |  14     ----------------------------<  101    4.0     |  25     |  1.12     Ca    8.6        01 Jan 2020 07:22  Ca    8.6        31 Dec 2019 06:35  Ca    8.7        30 Dec 2019 07:11    TPro  5.1    /  Alb  2.5    /  TBili  8.7    /  DBili  x      /  AST  29     /  ALT  17     /  AlkPhos  180    01 Jan 2020 07:22  TPro  4.9    /  Alb  2.5    /  TBili  9.5    /  DBili  x      /  AST  29     /  ALT  18     /  AlkPhos  118    31 Dec 2019 06:35  TPro  5.0    /  Alb  2.5    /  TBili  10.1   /  DBili  x      /  AST  29     /  ALT  17     /  AlkPhos  123    30 Dec 2019 07:11    PT/INR - ( 31 Dec 2019 09:17 )   PT: 24.9 sec;   INR: 2.15 ratio         PTT - ( 31 Dec 2019 09:17 )  PTT:42.1 sec

## 2020-01-01 NOTE — PROGRESS NOTE ADULT - ASSESSMENT
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 He was re-admitted from subacute rehab on 12/26/19 due to acute on chronic anemia without overt GI bleeding (found to have LLE hematoma), also with hepatic encephalopathy.    # Left leg swelling and pain      - Doppler neg for DVT, CT showing hematoma  #Acute on chronic anemia.  Baseline apprx 8, came in <6.  Possibly with hematemesis but no overt GI bleeding while here     - s/p EGD this morning with mild GAVE s/p APC treatment     - likely due to hematoma  # Decompensated JEAN cirrhosis, MELD-Na 25 on 12/31     - varices: grade I <5mm varices on EGD 12/30 not amenable to banding; also mild GAVE s/p APC      - SPE: none currently but waxes and wanes.  On lactulose and Xifaxan     - ascites: trace, not on diuretics     - HCC: none on CT this admission     - Transplant: Blood Type A positive, UNOS listed with updated MELD-Na of 27    Recommendations:  - monitor exam for size of hematoma and s/sx of compartment syndrome  -  PPI BID for GAVE  - lactulose TID and Xifaxan BID for HE   - monitor and record number of BMs daily  - folate, iron supplementation   - nutrition consultation to optimize nutritional status.  Low sodium diet.  Glucerna (chocolate) 3 times daily  - PT consultation (patient needs aggressive PT pre-transplant)  - daily CBC, CMP, INR

## 2020-01-01 NOTE — PROGRESS NOTE ADULT - SUBJECTIVE AND OBJECTIVE BOX
Chief Complaint:  Patient is a 64y old  Male who presents with a chief complaint of low Hgb (31 Dec 2019 11:58)    Interval Events: CT shows large hematoma of LLE. Pt w/o new complaints this AM.     Hospital Medications:  dextrose 40% Gel 15 Gram(s) Oral once PRN  dextrose 5%. 1000 milliLiter(s) IV Continuous <Continuous>  dextrose 50% Injectable 12.5 Gram(s) IV Push once  dextrose 50% Injectable 25 Gram(s) IV Push once  dextrose 50% Injectable 25 Gram(s) IV Push once  ferrous    sulfate 325 milliGRAM(s) Oral daily  folic acid 1 milliGRAM(s) Oral daily  glucagon  Injectable 1 milliGRAM(s) IntraMuscular once PRN  insulin glargine Injectable (LANTUS) 28 Unit(s) SubCutaneous at bedtime  insulin lispro (HumaLOG) corrective regimen sliding scale   SubCutaneous three times a day before meals  insulin lispro (HumaLOG) corrective regimen sliding scale   SubCutaneous at bedtime  insulin lispro Injectable (HumaLOG) 8 Unit(s) SubCutaneous three times a day before meals  lactulose Syrup 20 Gram(s) Oral three times a day  midodrine. 20 milliGRAM(s) Oral three times a day  pantoprazole    Tablet 40 milliGRAM(s) Oral two times a day  pantoprazole Infusion 8 mG/Hr IV Continuous <Continuous>  rifAXIMin 550 milliGRAM(s) Oral two times a day  tamsulosin 0.4 milliGRAM(s) Oral at bedtime      ROS:   General:  No wt loss, fevers, chills, night sweats  Eyes:  Good vision, no reported pain  ENT:  No sore throat, pain, runny nose, dysphagia  CV:  No pain, palpitations, hypo/hypertension  Pulm:  No dyspnea, cough, tachypnea, wheezing  GI:  See HPI, otherwise negative  :  No pain, bleeding, incontinence, nocturia  Muscle:  No pain, weakness  Neuro:  No weakness, tingling, memory problems  Psych:  No fatigue, insomnia, mood problems, depression  Endocrine:  No polyuria, polydipsia, cold/heat intolerance  Heme:  No petechiae, ecchymosis, easy bruisability  Skin:  No rash, tattoos, scars, edema    PHYSICAL EXAM:   Vital Signs:  Vital Signs Last 24 Hrs  T(C): 36.7 (2020 04:49), Max: 36.9 (31 Dec 2019 12:27)  T(F): 98.1 (2020 04:49), Max: 98.4 (31 Dec 2019 12:27)  HR: 90 (2020 04:49) (87 - 97)  BP: 111/62 (2020 04:49) (100/54 - 111/62)  BP(mean): --  RR: 18 (2020 04:49) (18 - 18)  SpO2: 97% (2020 04:49) (96% - 97%)  Daily     Daily Weight in k (2020 06:25)    GENERAL:  Appears stated age, well-groomed, well-nourished, no distress  HEENT:  NC/AT,  conjunctivae clear, sclera -anicteric  CHEST:  Full & symmetric excursion, no increased effort, breath sounds clear  HEART:  Regular rhythm, S1, S2, no murmur/rub/S3/S4,  no edema  ABDOMEN:  Soft, non-tender, non-distended, normoactive bowel sounds,  no masses ,no hepato-splenomegaly,   EXTREMITIES:  no cyanosis,clubbing or edema  SKIN:  L thigh firm, swollen, no erythema/warmth  NEURO:  Alert, oriented      LABS: reviewed                        7.3    4.90  )-----------( 71       ( 31 Dec 2019 08:56 )             21.8         136  |  102  |  16  ----------------------------<  157<H>  3.7   |  23  |  1.04    Ca    8.6      31 Dec 2019 06:35    TPro  4.9<L>  /  Alb  2.5<L>  /  TBili  9.5<H>  /  DBili  x   /  AST  29  /  ALT  18  /  AlkPhos  118      LIVER FUNCTIONS - ( 31 Dec 2019 06:35 )  Alb: 2.5 g/dL / Pro: 4.9 g/dL / ALK PHOS: 118 U/L / ALT: 18 U/L / AST: 29 U/L / GGT: x             Interval Diagnostic Studies: see sunrise for full report

## 2020-01-01 NOTE — PROGRESS NOTE ADULT - ASSESSMENT
63 year old male with HTN, DM2, non-alcoholic cirrhosis, who was recently admitted with altered mental status, hyponatremia and high ammonia level. We last saw him during a hospitalization in 10/2019, during which he was significantly volume overloaded and required iv diuresis.  He is currently being evaluated for liver transplant.  He returns last week with acute blood loss anemia. CT with trace pleural effusions, anasarca, and atherosclerotic calcifications.  EGD yesterday with mild gave s/p APC treatment    Abnormal EKG  - His EKGs have demonstrated prolonged qtc in the past  - repeat ekg shows normal QT  - avoid qtc prolonging medications and replete K to greater than 4 and mg>2     Diastolic HF   - His degree of LE is reportedly increasing.  CXR is clear and CT with only trace effusions  - watch volume with PRBC transfusions  - consider restarting po lasix 20 mg daily today  - TTE normal LV systolic function EF 65%; LVH, DD Grade I  - cath with normal filling pressures and no significant cad.  - no asa in setting of acute blood loss anemia.    HTN  - BP on softer side  - hold diuretics and anti-hypertensives  - midodrine as needed    Anemia  - transfuse to keep Hb >8  - GI follow up    - All other workup as per primary team

## 2020-01-01 NOTE — PROGRESS NOTE ADULT - ASSESSMENT
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 Treated with argon plasma coagulation (APC)  trendy h/h daily, transfuse prn  f/u with hepatology and liver transplant team  leg shows hematoma, vit K

## 2020-01-01 NOTE — PROGRESS NOTE ADULT - SUBJECTIVE AND OBJECTIVE BOX
INTERVAL HPI/OVERNIGHT EVENTS:        pt seen and examined  he denies abdominal pain, n/v  tolerating PO   4 bm yesterday    MEDICATIONS  (STANDING):  cefTRIAXone   IVPB 1000 milliGRAM(s) IV Intermittent every 24 hours  dextrose 5%. 1000 milliLiter(s) (50 mL/Hr) IV Continuous <Continuous>  dextrose 50% Injectable 12.5 Gram(s) IV Push once  dextrose 50% Injectable 25 Gram(s) IV Push once  dextrose 50% Injectable 25 Gram(s) IV Push once  insulin glargine Injectable (LANTUS) 28 Unit(s) SubCutaneous at bedtime  insulin lispro (HumaLOG) corrective regimen sliding scale   SubCutaneous three times a day before meals  insulin lispro (HumaLOG) corrective regimen sliding scale   SubCutaneous at bedtime  insulin lispro Injectable (HumaLOG) 8 Unit(s) SubCutaneous three times a day before meals  lactulose Syrup 20 Gram(s) Oral every 6 hours  midodrine. 20 milliGRAM(s) Oral three times a day  pantoprazole Infusion 8 mG/Hr (10 mL/Hr) IV Continuous <Continuous>  rifAXIMin 550 milliGRAM(s) Oral two times a day  sucralfate 1 Gram(s) Oral two times a day  tamsulosin 0.4 milliGRAM(s) Oral at bedtime    MEDICATIONS  (PRN):  dextrose 40% Gel 15 Gram(s) Oral once PRN Blood Glucose LESS THAN 70 milliGRAM(s)/deciliter  glucagon  Injectable 1 milliGRAM(s) IntraMuscular once PRN Glucose LESS THAN 70 milligrams/deciliter      Allergies    codeine (Anaphylaxis)    Intolerances    NO  RED MEAT (Unknown)      Review of Systems:    General:  No wt loss, fevers, chills, night sweats,fatigue,   Eyes:  Good vision, no reported pain  ENT:  No sore throat, pain, runny nose, dysphagia  CV:  No pain, palpitations, hypo/hypertension  Resp:  No dyspnea, cough, tachypnea, wheezing  GI:  No pain, No nausea, No vomiting, No diarrhea, No constipation, No weight loss, No fever, No pruritis, No rectal bleeding, No melena, No dysphagia  :  No pain, bleeding, incontinence, nocturia  Muscle:  No pain, weakness  Neuro:  No weakness, tingling, memory problems  Psych:  No fatigue, insomnia, mood problems, depression  Endocrine:  No polyuria, polydypsia, cold/heat intolerance  Heme:  No petechiae, ecchymosis, easy bruisability  Skin:  No rash, tattoos, scars, edema      Vital Signs Last 24 Hrs  T(C): 37 (31 Dec 2019 04:36), Max: 37.1 (30 Dec 2019 20:48)  T(F): 98.6 (31 Dec 2019 04:36), Max: 98.7 (30 Dec 2019 20:48)  HR: 87 (31 Dec 2019 09:50) (84 - 93)  BP: 100/54 (31 Dec 2019 09:50) (100/54 - 129/55)  BP(mean): --  RR: 17 (31 Dec 2019 04:36) (17 - 18)  SpO2: 97% (31 Dec 2019 09:50) (94% - 98%)    PHYSICAL EXAM:    Constitutional: NAD, well-developed  HEENT: EOMI, throat clear  Neck: No LAD, supple  Respiratory: CTA and P  Cardiovascular: S1 and S2, RRR, no M  Gastrointestinal: BS+, soft, NT/ND, neg HSM,  Extremities: No peripheral edema, neg clubing, cyanosis  Vascular: 2+ peripheral pulses  Neurological: A/O x 3, no focal deficits  Psychiatric: Normal mood, normal affect  Skin: No rashes      LABS:                        7.3    4.90  )-----------( 71       ( 31 Dec 2019 08:56 )             21.8     12-31    136  |  102  |  16  ----------------------------<  157<H>  3.7   |  23  |  1.04    Ca    8.6      31 Dec 2019 06:35    TPro  4.9<L>  /  Alb  2.5<L>  /  TBili  9.5<H>  /  DBili  x   /  AST  29  /  ALT  18  /  AlkPhos  118  12-31    PT/INR - ( 31 Dec 2019 09:17 )   PT: 24.9 sec;   INR: 2.15 ratio         PTT - ( 31 Dec 2019 09:17 )  PTT:42.1 sec      RADIOLOGY & ADDITIONAL TESTS:

## 2020-01-02 LAB
ALBUMIN SERPL ELPH-MCNC: 2.4 G/DL — LOW (ref 3.3–5)
ALP SERPL-CCNC: 150 U/L — HIGH (ref 40–120)
ALT FLD-CCNC: 17 U/L — SIGNIFICANT CHANGE UP (ref 10–45)
ANION GAP SERPL CALC-SCNC: 9 MMOL/L — SIGNIFICANT CHANGE UP (ref 5–17)
AST SERPL-CCNC: 26 U/L — SIGNIFICANT CHANGE UP (ref 10–40)
BILIRUB SERPL-MCNC: 8.6 MG/DL — HIGH (ref 0.2–1.2)
BUN SERPL-MCNC: 15 MG/DL — SIGNIFICANT CHANGE UP (ref 7–23)
CALCIUM SERPL-MCNC: 8.4 MG/DL — SIGNIFICANT CHANGE UP (ref 8.4–10.5)
CHLORIDE SERPL-SCNC: 103 MMOL/L — SIGNIFICANT CHANGE UP (ref 96–108)
CK SERPL-CCNC: 38 U/L — SIGNIFICANT CHANGE UP (ref 30–200)
CO2 SERPL-SCNC: 23 MMOL/L — SIGNIFICANT CHANGE UP (ref 22–31)
CREAT SERPL-MCNC: 1.13 MG/DL — SIGNIFICANT CHANGE UP (ref 0.5–1.3)
GLUCOSE BLDC GLUCOMTR-MCNC: 125 MG/DL — HIGH (ref 70–99)
GLUCOSE BLDC GLUCOMTR-MCNC: 128 MG/DL — HIGH (ref 70–99)
GLUCOSE BLDC GLUCOMTR-MCNC: 183 MG/DL — HIGH (ref 70–99)
GLUCOSE BLDC GLUCOMTR-MCNC: 186 MG/DL — HIGH (ref 70–99)
GLUCOSE SERPL-MCNC: 150 MG/DL — HIGH (ref 70–99)
HCT VFR BLD CALC: 21.3 % — LOW (ref 39–50)
HGB BLD-MCNC: 7.3 G/DL — LOW (ref 13–17)
INR BLD: 2.16 RATIO — HIGH (ref 0.88–1.16)
MCHC RBC-ENTMCNC: 34.3 GM/DL — SIGNIFICANT CHANGE UP (ref 32–36)
MCHC RBC-ENTMCNC: 35.1 PG — HIGH (ref 27–34)
MCV RBC AUTO: 102.4 FL — HIGH (ref 80–100)
PLATELET # BLD AUTO: 71 K/UL — LOW (ref 150–400)
POTASSIUM SERPL-MCNC: 4 MMOL/L — SIGNIFICANT CHANGE UP (ref 3.5–5.3)
POTASSIUM SERPL-SCNC: 4 MMOL/L — SIGNIFICANT CHANGE UP (ref 3.5–5.3)
PROT SERPL-MCNC: 4.9 G/DL — LOW (ref 6–8.3)
PROTHROM AB SERPL-ACNC: 25.2 SEC — HIGH (ref 10–13.1)
RBC # BLD: 2.08 M/UL — LOW (ref 4.2–5.8)
RBC # FLD: 24.2 % — HIGH (ref 10.3–14.5)
SODIUM SERPL-SCNC: 135 MMOL/L — SIGNIFICANT CHANGE UP (ref 135–145)
WBC # BLD: 6.52 K/UL — SIGNIFICANT CHANGE UP (ref 3.8–10.5)
WBC # FLD AUTO: 6.52 K/UL — SIGNIFICANT CHANGE UP (ref 3.8–10.5)

## 2020-01-02 PROCEDURE — 99232 SBSQ HOSP IP/OBS MODERATE 35: CPT

## 2020-01-02 PROCEDURE — 99232 SBSQ HOSP IP/OBS MODERATE 35: CPT | Mod: GC

## 2020-01-02 RX ORDER — FUROSEMIDE 40 MG
20 TABLET ORAL DAILY
Refills: 0 | Status: DISCONTINUED | OUTPATIENT
Start: 2020-01-02 | End: 2020-01-07

## 2020-01-02 RX ORDER — SPIRONOLACTONE 25 MG/1
50 TABLET, FILM COATED ORAL DAILY
Refills: 0 | Status: DISCONTINUED | OUTPATIENT
Start: 2020-01-02 | End: 2020-01-10

## 2020-01-02 RX ADMIN — PANTOPRAZOLE SODIUM 40 MILLIGRAM(S): 20 TABLET, DELAYED RELEASE ORAL at 05:44

## 2020-01-02 RX ADMIN — LACTULOSE 20 GRAM(S): 10 SOLUTION ORAL at 22:01

## 2020-01-02 RX ADMIN — LACTULOSE 20 GRAM(S): 10 SOLUTION ORAL at 14:49

## 2020-01-02 RX ADMIN — Medication 0: at 08:40

## 2020-01-02 RX ADMIN — MIDODRINE HYDROCHLORIDE 20 MILLIGRAM(S): 2.5 TABLET ORAL at 12:44

## 2020-01-02 RX ADMIN — Medication 325 MILLIGRAM(S): at 12:44

## 2020-01-02 RX ADMIN — INSULIN GLARGINE 28 UNIT(S): 100 INJECTION, SOLUTION SUBCUTANEOUS at 22:02

## 2020-01-02 RX ADMIN — TAMSULOSIN HYDROCHLORIDE 0.4 MILLIGRAM(S): 0.4 CAPSULE ORAL at 22:02

## 2020-01-02 RX ADMIN — Medication 8 UNIT(S): at 12:43

## 2020-01-02 RX ADMIN — SPIRONOLACTONE 50 MILLIGRAM(S): 25 TABLET, FILM COATED ORAL at 14:48

## 2020-01-02 RX ADMIN — MIDODRINE HYDROCHLORIDE 20 MILLIGRAM(S): 2.5 TABLET ORAL at 05:44

## 2020-01-02 RX ADMIN — PANTOPRAZOLE SODIUM 40 MILLIGRAM(S): 20 TABLET, DELAYED RELEASE ORAL at 17:54

## 2020-01-02 RX ADMIN — Medication 8 UNIT(S): at 17:52

## 2020-01-02 RX ADMIN — MIDODRINE HYDROCHLORIDE 20 MILLIGRAM(S): 2.5 TABLET ORAL at 17:54

## 2020-01-02 RX ADMIN — Medication 1: at 17:52

## 2020-01-02 RX ADMIN — PANTOPRAZOLE SODIUM 10 MG/HR: 20 TABLET, DELAYED RELEASE ORAL at 08:39

## 2020-01-02 RX ADMIN — LACTULOSE 20 GRAM(S): 10 SOLUTION ORAL at 05:44

## 2020-01-02 RX ADMIN — Medication 20 MILLIGRAM(S): at 12:49

## 2020-01-02 RX ADMIN — Medication 8 UNIT(S): at 08:40

## 2020-01-02 RX ADMIN — Medication 0: at 12:42

## 2020-01-02 RX ADMIN — Medication 1 MILLIGRAM(S): at 12:44

## 2020-01-02 NOTE — PROGRESS NOTE ADULT - ASSESSMENT
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 Treated with argon plasma coagulation (APC)  trendy h/h daily, transfuse prn  f/u with hepatology and liver transplant team  leg shows hematoma, vit K   if H/h declines, pt will need repeat imaging LLE to evaluate for active extravasation

## 2020-01-02 NOTE — PROGRESS NOTE ADULT - SUBJECTIVE AND OBJECTIVE BOX
Transplant Surgery Daily Progress Note    SUBJECTIVE:   Interval historyPatient seen and examined during the morning round with Dr. Lucio, no over night event, still having some discomfort over left lower extremity; no encephalopathy.      OBJECTIVE:   Vital Signs Last 24 Hrs  T(C): 36.6 (02 Jan 2020 04:49), Max: 37.8 (01 Jan 2020 21:35)  T(F): 97.9 (02 Jan 2020 04:49), Max: 100 (01 Jan 2020 21:35)  HR: 86 (02 Jan 2020 04:49) (86 - 92)  BP: 112/63 (02 Jan 2020 04:49) (109/67 - 114/51)  BP(mean): --  RR: 18 (02 Jan 2020 04:49) (18 - 18)  SpO2: 97% (02 Jan 2020 04:49) (93% - 97%)    PHYSICAL EXAM:  Constitutional: resting in bed with no acute distress  Respiratory:  unlabored breathing  Gastrointestinal: Abdomen soft, non distended, non tenderness, incision sites are clean, dry and intact.   Genitourinary:  Beasley present with clear urine   Extremities:  No edema, no calf tenderness    RADIOLOGY & ADDITIONAL STUDIES:   EXAM:  CT LWR EXT LT                        PROCEDURE DATE:  12/31/2019      FINDINGS:  BONE: No acute fracture. No focal lytic or blastic lesions. No cortical destruction or new periosteal bone formation. Enthesopathy at the attachment of the gluteal tendons on the greater trochanter. No dislocation. Multilevel spondylosis and facet arthropathy of the lower lumbar spine. Partial ankylosis of the left SI joint. Degenerative changes of the pubic symphysis. Cartilage space loss with marginal osteophyte formation of the left hip and knee joints. Small left knee joint effusion. No hip joint effusion.    SOFT TISSUE: Intramuscular hematoma is identified in the vastus intermedius measuring approximately 43 x 30 x 140 mm. The hematoma is new when compared to prior CT. Hematoma causes mild expansion of the vastus intermedialis muscle. No additional focal collections are identified. No focal muscle atrophy. Normal CT appearance of the imaged tendons. Vascular calcifications. Vascular structures are otherwise normal in course and caliber. Edema in the subcutaneous fat along the anterior and lateral aspects of the mid to distal thigh. Mild skin thickening in the distal thigh. Nonobstructive renal calculus in the left kidney. Trace pelvic free fluid. No pelvic lymphadenopathy.    IMPRESSION:  1.  New intramuscular hematoma in the vastus intermedialis measuring 43 x 30 x 140 mm.  2.  CT is otherwise similar in appearance to prior examination. Transplant Surgery Daily Progress Note    SUBJECTIVE: Patient seen and examined during the morning round with Dr. Lucio, no over night event, still having some discomfort over left lower extremity; no encephalopathy.      OBJECTIVE:   Vital Signs Last 24 Hrs  T(C): 36.6 (02 Jan 2020 04:49), Max: 37.8 (01 Jan 2020 21:35)  T(F): 97.9 (02 Jan 2020 04:49), Max: 100 (01 Jan 2020 21:35)  HR: 86 (02 Jan 2020 04:49) (86 - 92)  BP: 112/63 (02 Jan 2020 04:49) (109/67 - 114/51)  BP(mean): --  RR: 18 (02 Jan 2020 04:49) (18 - 18)  SpO2: 97% (02 Jan 2020 04:49) (93% - 97%)    PHYSICAL EXAM:  Constitutional: resting in bed with no acute distress  Respiratory:  unlabored breathing  Gastrointestinal: Abdomen soft, non distended, non tenderness  Extremities: left lower extremity  with swelling, palpable pedal pulses  Neruo: AO x 3, responding appropriately, bilateral upper and lower extremity motor/sensory intact     LABS:   CBC (01-02 @ 08:58)                          7.3<L>                   6.52    )--------------(  71<L>      --    % Neuts, --    % Lymphs, ANC: --                              21.3<L>  CBC (01-01 @ 09:54)                          7.1<L>                   5.10    )--------------(  73<L>      --    % Neuts, --    % Lymphs, ANC: --                              21.6<L>    BMP (01-02 @ 07:05)       135     |  103     |  15    			Ca++ --      Ca 8.4          ---------------------------------( 150<H>		Mg --           4.0     |  23      |  1.13  			Ph --      BMP (01-01 @ 07:22)       135     |  102     |  17    			Ca++ --      Ca 8.6          ---------------------------------( 175<H>		Mg --           4.0     |  23      |  1.11  			Ph --        LFTs (01-02 @ 07:05)      TPro 4.9<L> / Alb 2.4<L> / TBili 8.6<H> / DBili -- / AST 26 / ALT 17 / AlkPhos 150<H>  LFTs (01-01 @ 07:22)      TPro 5.1<L> / Alb 2.5<L> / TBili 8.7<H> / DBili -- / AST 29 / ALT 17 / AlkPhos 180<H>    Coags (01-02 @ 08:22)  aPTT -- / INR 2.16<H> / PT 25.2<H>  Coags (01-01 @ 10:02)  aPTT -- / INR 2.02<H> / PT 23.7<H>    RADIOLOGY & ADDITIONAL STUDIES:   EXAM:  CT LWR EXT LT                        PROCEDURE DATE:  12/31/2019      FINDINGS:  BONE: No acute fracture. No focal lytic or blastic lesions. No cortical destruction or new periosteal bone formation. Enthesopathy at the attachment of the gluteal tendons on the greater trochanter. No dislocation. Multilevel spondylosis and facet arthropathy of the lower lumbar spine. Partial ankylosis of the left SI joint. Degenerative changes of the pubic symphysis. Cartilage space loss with marginal osteophyte formation of the left hip and knee joints. Small left knee joint effusion. No hip joint effusion.    SOFT TISSUE: Intramuscular hematoma is identified in the vastus intermedius measuring approximately 43 x 30 x 140 mm. The hematoma is new when compared to prior CT. Hematoma causes mild expansion of the vastus intermedialis muscle. No additional focal collections are identified. No focal muscle atrophy. Normal CT appearance of the imaged tendons. Vascular calcifications. Vascular structures are otherwise normal in course and caliber. Edema in the subcutaneous fat along the anterior and lateral aspects of the mid to distal thigh. Mild skin thickening in the distal thigh. Nonobstructive renal calculus in the left kidney. Trace pelvic free fluid. No pelvic lymphadenopathy.    IMPRESSION:  1.  New intramuscular hematoma in the vastus intermedialis measuring 43 x 30 x 140 mm.  2.  CT is otherwise similar in appearance to prior examination.

## 2020-01-02 NOTE — PROGRESS NOTE ADULT - ASSESSMENT
63 year old male with HTN, DM2, non-alcoholic cirrhosis, who was recently admitted with altered mental status, hyponatremia and high ammonia level. We last saw him during a hospitalization in 10/2019, during which he was significantly volume overloaded and required iv diuresis.  He is currently being evaluated for liver transplant.  He returns last week with acute blood loss anemia. CT with trace pleural effusions, anasarca, and atherosclerotic calcifications.  EGD yesterday with mild gave s/p APC treatment    Abnormal EKG  - His EKGs have demonstrated prolonged qtc in the past  - repeat ekg shows normal QT  - avoid qtc prolonging medications and replete K to greater than 4 and mg>2     Diastolic HF   -  CXR is clear and CT with only trace effusions. Though still with lower ext edema  - watch volume with PRBC transfusions  - consider restarting po lasix 20 mg daily for edema  - TTE normal LV systolic function EF 65%; LVH, DD Grade I  - cath with normal filling pressures and no significant cad.  - no asa in setting of acute blood loss anemia.    HTN  - BP on softer side  - hold diuretics and anti-hypertensives  - midodrine as needed    Anemia  - transfuse to keep Hb >8. trend H/H  - GI follow up    - All other workup as per primary team

## 2020-01-02 NOTE — PROGRESS NOTE ADULT - ASSESSMENT
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. He was re-admitted from subacute rehab on 12/26/19 due to acute on chronic anemia without overt GI bleeding (found to have LLE hematoma), also with hepatic encephalopathy.    # Left leg swelling and pain      - Large 14cm left thigh hematoma  #Acute on chronic anemia     - acute component likely secondary to thigh hematoma above     - s/p EGD 12/30 with mild GAVE s/p APC treatment  # Decompensated JEAN cirrhosis, MELD-Na 26 on 1/2     - varices: grade I <5mm varices on EGD 12/30 not amenable to banding; also mild GAVE s/p APC      - SPE: none currently but waxes and wanes.  On lactulose and Xifaxan     - ascites: trace, not on diuretics     - HCC: none on CT this admission     - Transplant: Blood Type A positive, UNOS listed with updated MELD-Na of 27 last week    Recommendations:  - monitor exam for size of hematoma and s/sx of compartment syndrome  - if persistent   - transition to PPI PO BID for GAVE (can stop PPI gtt)  - obtain PT consultation - patient needs aggressive PT and has not been getting therapy while here  - obtain nutrition consultation.  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 He was re-admitted from subacute rehab on 12/26/19 due to acute on chronic anemia without overt GI bleeding (found to have LLE hematoma), also with hepatic encephalopathy.    # Left leg swelling and pain      - Large 14cm left thigh hematoma  #Acute on chronic anemia     - acute component likely secondary to thigh hematoma above     - s/p EGD 12/30 with mild GAVE s/p APC treatment  # Decompensated JEAN cirrhosis, MELD-Na 26 on 1/2     - varices: grade I <5mm varices on EGD 12/30 not amenable to banding; also mild GAVE s/p APC      - SPE: none currently but waxes and wanes.  On lactulose and Xifaxan     - ascites: trace, not on diuretics     - HCC: none on CT this admission     - Transplant: Blood Type A positive, UNOS listed with updated MELD-Na of 27 last week    Recommendations:  - monitor exam for size of hematoma and s/sx of compartment syndrome  - check CPK as well given large hematoma  - transition to PPI PO BID for GAVE (can stop PPI gtt)  - initiate lasix 20mg PO daily and spironolactone 50mg daily   - obtain PT consultation - patient needs aggressive PT and to move around most of day  - obtain nutrition consultation.  Low salt diet  - start Glucerna TID with meals given low albumin  - lactulose TID and Xifaxan BID for HE   - folate, iron supplementation   - daily CBC, CMP, INR  - if Hgb dropping, concern for continued bleeding into hematoma, will need repeat imaging (possible CTA to look for active extravasation)

## 2020-01-02 NOTE — PROGRESS NOTE ADULT - ASSESSMENT
65 yo M with IDDM, dyslipidemia, obesity, GERD, HFpEF with mild LV diastolic dysfunction, and decompensated EJAN cirrhosis complicated by ascites, history of SBP, hepatic encephalopathy, and chronic anemia with a history of duodenal ulcer as well as GAVE and duodenal AVM s/p APC (last on 10/11/19), who is UNOS listed for liver transplantation at [facility]. He is currently admitted for acute anemia and left lower extremity intramuscular hematoma s/p fall    JEAN Cirrhosis, MELD-Na 26 today without encephalopathy   - UNOS listed, will continue to follow   - Continue diuretics and low salt diet as per hepatology recommendation     Left lower extremity intramuscular hematoma   - monitor CBC and transfuse PRN to maintain HGB > 7.0, may need CTA if there is evidence of active bleeding   - no clinical evidence of compartment syndrome, serial exam

## 2020-01-02 NOTE — PROGRESS NOTE ADULT - SUBJECTIVE AND OBJECTIVE BOX
INTERVAL HPI/OVERNIGHT EVENTS:    pt seen and examined  he denies abdominal pain, n/v  tolerating PO     MEDICATIONS  (STANDING):  dextrose 5%. 1000 milliLiter(s) (50 mL/Hr) IV Continuous <Continuous>  dextrose 50% Injectable 12.5 Gram(s) IV Push once  dextrose 50% Injectable 25 Gram(s) IV Push once  dextrose 50% Injectable 25 Gram(s) IV Push once  ferrous    sulfate 325 milliGRAM(s) Oral daily  folic acid 1 milliGRAM(s) Oral daily  furosemide    Tablet 20 milliGRAM(s) Oral daily  insulin glargine Injectable (LANTUS) 28 Unit(s) SubCutaneous at bedtime  insulin lispro (HumaLOG) corrective regimen sliding scale   SubCutaneous three times a day before meals  insulin lispro (HumaLOG) corrective regimen sliding scale   SubCutaneous at bedtime  insulin lispro Injectable (HumaLOG) 8 Unit(s) SubCutaneous three times a day before meals  lactulose Syrup 20 Gram(s) Oral three times a day  midodrine. 20 milliGRAM(s) Oral three times a day  pantoprazole    Tablet 40 milliGRAM(s) Oral two times a day  rifAXIMin 550 milliGRAM(s) Oral two times a day  spironolactone 50 milliGRAM(s) Oral daily  tamsulosin 0.4 milliGRAM(s) Oral at bedtime    MEDICATIONS  (PRN):  dextrose 40% Gel 15 Gram(s) Oral once PRN Blood Glucose LESS THAN 70 milliGRAM(s)/deciliter  glucagon  Injectable 1 milliGRAM(s) IntraMuscular once PRN Glucose LESS THAN 70 milligrams/deciliter      Allergies    codeine (Anaphylaxis)    Intolerances    NO  RED MEAT (Unknown)      Review of Systems:    General:  No wt loss, fevers, chills, night sweats,fatigue,   Eyes:  Good vision, no reported pain  ENT:  No sore throat, pain, runny nose, dysphagia  CV:  No pain, palpitations, hypo/hypertension  Resp:  No dyspnea, cough, tachypnea, wheezing  GI:  No pain, No nausea, No vomiting, No diarrhea, No constipation, No weight loss, No fever, No pruritis, No rectal bleeding, No melena, No dysphagia  :  No pain, bleeding, incontinence, nocturia  Muscle:  No pain, weakness  Neuro:  No weakness, tingling, memory problems  Psych:  No fatigue, insomnia, mood problems, depression  Endocrine:  No polyuria, polydypsia, cold/heat intolerance  Heme:  No petechiae, ecchymosis, easy bruisability  Skin:  No rash, tattoos, scars, edema      Vital Signs Last 24 Hrs  T(C): 36.6 (02 Jan 2020 04:49), Max: 37.8 (01 Jan 2020 21:35)  T(F): 97.9 (02 Jan 2020 04:49), Max: 100 (01 Jan 2020 21:35)  HR: 86 (02 Jan 2020 04:49) (86 - 92)  BP: 112/63 (02 Jan 2020 04:49) (109/67 - 114/51)  BP(mean): --  RR: 18 (02 Jan 2020 04:49) (18 - 18)  SpO2: 97% (02 Jan 2020 04:49) (93% - 97%)    PHYSICAL EXAM:    Constitutional: NAD, well-developed  HEENT: EOMI, throat clear  Neck: No LAD, supple  Respiratory: CTA and P  Cardiovascular: S1 and S2, RRR, no M  Gastrointestinal: BS+, soft, NT/ND, neg HSM,  Extremities: No peripheral edema, neg clubing, cyanosis  Vascular: 2+ peripheral pulses  Neurological: A/O x 3, no focal deficits  Psychiatric: Normal mood, normal affect  Skin: No rashes      LABS:                        7.3    6.52  )-----------( 71       ( 02 Jan 2020 08:58 )             21.3     01-02    135  |  103  |  15  ----------------------------<  150<H>  4.0   |  23  |  1.13    Ca    8.4      02 Jan 2020 07:05    TPro  4.9<L>  /  Alb  2.4<L>  /  TBili  8.6<H>  /  DBili  x   /  AST  26  /  ALT  17  /  AlkPhos  150<H>  01-02    PT/INR - ( 02 Jan 2020 08:22 )   PT: 25.2 sec;   INR: 2.16 ratio               RADIOLOGY & ADDITIONAL TESTS:

## 2020-01-02 NOTE — PROGRESS NOTE ADULT - SUBJECTIVE AND OBJECTIVE BOX
SUBJECTIVE / OVERNIGHT EVENTS: pt seen and examined    MEDICATIONS  (STANDING):  dextrose 5%. 1000 milliLiter(s) (50 mL/Hr) IV Continuous <Continuous>  dextrose 50% Injectable 12.5 Gram(s) IV Push once  dextrose 50% Injectable 25 Gram(s) IV Push once  dextrose 50% Injectable 25 Gram(s) IV Push once  ferrous    sulfate 325 milliGRAM(s) Oral daily  folic acid 1 milliGRAM(s) Oral daily  furosemide    Tablet 20 milliGRAM(s) Oral daily  insulin glargine Injectable (LANTUS) 28 Unit(s) SubCutaneous at bedtime  insulin lispro (HumaLOG) corrective regimen sliding scale   SubCutaneous three times a day before meals  insulin lispro (HumaLOG) corrective regimen sliding scale   SubCutaneous at bedtime  insulin lispro Injectable (HumaLOG) 8 Unit(s) SubCutaneous three times a day before meals  lactulose Syrup 20 Gram(s) Oral three times a day  midodrine. 20 milliGRAM(s) Oral three times a day  pantoprazole    Tablet 40 milliGRAM(s) Oral two times a day  rifAXIMin 550 milliGRAM(s) Oral two times a day  spironolactone 50 milliGRAM(s) Oral daily  tamsulosin 0.4 milliGRAM(s) Oral at bedtime    MEDICATIONS  (PRN):  dextrose 40% Gel 15 Gram(s) Oral once PRN Blood Glucose LESS THAN 70 milliGRAM(s)/deciliter  glucagon  Injectable 1 milliGRAM(s) IntraMuscular once PRN Glucose LESS THAN 70 milligrams/deciliter    Vital Signs Last 24 Hrs  T(C): 37.4 (02 Jan 2020 21:08), Max: 37.4 (02 Jan 2020 21:08)  T(F): 99.3 (02 Jan 2020 21:08), Max: 99.3 (02 Jan 2020 21:08)  HR: 83 (02 Jan 2020 21:08) (77 - 86)  BP: 114/65 (02 Jan 2020 21:08) (112/63 - 125/70)  BP(mean): --  RR: 18 (02 Jan 2020 21:08) (18 - 18)  SpO2: 96% (02 Jan 2020 21:08) (96% - 98%)    Constitutional: No fever, fatigue  Skin: No rash.  Eyes: No recent vision problems or eye pain.  ENT: No congestion, ear pain, or sore throat.  Cardiovascular: No chest pain or palpation.  Respiratory: No cough, shortness of breath, congestion, or wheezing.  Gastrointestinal: No abdominal pain, nausea, vomiting, or diarrhea.  Genitourinary: No dysuria.  Musculoskeletal: No joint swelling.  Neurologic: No headache.    PHYSICAL EXAM:  GENERAL: NAD  EYES: EOMI, PERRLA  NECK: Supple, No JVD  CHEST/LUNG: dec breath sounds rt base  HEART:  S1 , S2 +  ABDOMEN: soft , bs+, mild distension+  EXTREMITIES: left > rt edema+  NEUROLOGY: alert awake oriented x place, person    LABS:  01-02    135  |  103  |  15  ----------------------------<  150<H>  4.0   |  23  |  1.13    Ca    8.4      02 Jan 2020 07:05    TPro  4.9<L>  /  Alb  2.4<L>  /  TBili  8.6<H>  /  DBili      /  AST  26  /  ALT  17  /  AlkPhos  150<H>  01-02    Creatinine Trend: 1.13 <--, 1.11 <--, 1.04 <--, 1.12 <--, 1.26 <--, 1.23 <--, 1.17 <--                        7.3    6.52  )-----------( 71       ( 02 Jan 2020 08:58 )             21.3     Urine Studies:      CARDIAC MARKERS ( 02 Jan 2020 12:12 )  x     / x     / 38 U/L / x     / x            LIVER FUNCTIONS - ( 02 Jan 2020 07:05 )  Alb: 2.4 g/dL / Pro: 4.9 g/dL / ALK PHOS: 150 U/L / ALT: 17 U/L / AST: 26 U/L / GGT: x           PT/INR - ( 02 Jan 2020 08:22 )   PT: 25.2 sec;   INR: 2.16 ratio           Consultant(s) Notes Reviewed:      Care Discussed with Consultants/Other Providers:

## 2020-01-02 NOTE — PROGRESS NOTE ADULT - SUBJECTIVE AND OBJECTIVE BOX
[facility] -- Sushila Dhaliwal, Morenita Camacho Pannella, Patel, Savella  Office # [phone]      Follow Up:  HFPEF, anemia    Subjective/Observations: Patient seen and examined. Events noted. Resting comfortably in bed. No complaints of chest pain, dyspnea, or palpitations reported. No signs of orthopnea or PND.       REVIEW OF SYSTEMS: All other review of systems is negative unless indicated above    PAST MEDICAL & SURGICAL HISTORY:  GIB (gastrointestinal bleeding)  GERD with esophagitis: Gastritis &amp; Non Bleeding Ulcers  Hepatic encephalopathy  Obesity  Fatty liver disease, nonalcoholic  Renal stones: 25 years ago  Hypertension  Neuropathy  Hypercholesteremia  Diabetes  S/P cholecystectomy      MEDICATIONS  (STANDING):  dextrose 5%. 1000 milliLiter(s) (50 mL/Hr) IV Continuous <Continuous>  dextrose 50% Injectable 12.5 Gram(s) IV Push once  dextrose 50% Injectable 25 Gram(s) IV Push once  dextrose 50% Injectable 25 Gram(s) IV Push once  ferrous    sulfate 325 milliGRAM(s) Oral daily  folic acid 1 milliGRAM(s) Oral daily  insulin glargine Injectable (LANTUS) 28 Unit(s) SubCutaneous at bedtime  insulin lispro (HumaLOG) corrective regimen sliding scale   SubCutaneous three times a day before meals  insulin lispro (HumaLOG) corrective regimen sliding scale   SubCutaneous at bedtime  insulin lispro Injectable (HumaLOG) 8 Unit(s) SubCutaneous three times a day before meals  lactulose Syrup 20 Gram(s) Oral three times a day  midodrine. 20 milliGRAM(s) Oral three times a day  pantoprazole    Tablet 40 milliGRAM(s) Oral two times a day  pantoprazole Infusion 8 mG/Hr (10 mL/Hr) IV Continuous <Continuous>  rifAXIMin 550 milliGRAM(s) Oral two times a day  tamsulosin 0.4 milliGRAM(s) Oral at bedtime    MEDICATIONS  (PRN):  dextrose 40% Gel 15 Gram(s) Oral once PRN Blood Glucose LESS THAN 70 milliGRAM(s)/deciliter  glucagon  Injectable 1 milliGRAM(s) IntraMuscular once PRN Glucose LESS THAN 70 milligrams/deciliter      Allergies    codeine (Anaphylaxis)    Intolerances    NO  RED MEAT (Unknown)          Vital Signs Last 24 Hrs  T(C): 36.6 (02 Jan 2020 04:49), Max: 37.8 (01 Jan 2020 21:35)  T(F): 97.9 (02 Jan 2020 04:49), Max: 100 (01 Jan 2020 21:35)  HR: 86 (02 Jan 2020 04:49) (86 - 92)  BP: 112/63 (02 Jan 2020 04:49) (109/67 - 114/51)  BP(mean): --  RR: 18 (02 Jan 2020 04:49) (18 - 18)  SpO2: 97% (02 Jan 2020 04:49) (93% - 97%)    I&O's Summary    01 Jan 2020 07:01  -  02 Jan 2020 07:00  --------------------------------------------------------  IN: 230 mL / OUT: 1050 mL / NET: -820 mL          PHYSICAL EXAM:     Constitutional: NAD, awake    HEENT: Moist Mucous Membranes, Anicteric  Pulmonary: Decreased breath sounds b/l. No rales, crackles or wheeze appreciated.   Cardiovascular: Regular, S1 and S2, No murmurs, rubs, gallops or clicks  Gastrointestinal: Bowel Sounds present, soft, nontender.   Lymph: L>R edema in lower ext  Skin: No visible rashes or ulcers.  Psych:  Mood & affect appropriate for situation    LABS: All Labs Reviewed:                        7.1    5.10  )-----------( 73       ( 01 Jan 2020 09:54 )             21.6                         7.3    4.90  )-----------( 71       ( 31 Dec 2019 08:56 )             21.8                         7.6    5.62  )-----------( 74       ( 30 Dec 2019 22:50 )             23.9     02 Jan 2020 07:05    135    |  103    |  15     ----------------------------<  150    4.0     |  23     |  1.13   01 Jan 2020 07:22    135    |  102    |  17     ----------------------------<  175    4.0     |  23     |  1.11   31 Dec 2019 06:35    136    |  102    |  16     ----------------------------<  157    3.7     |  23     |  1.04     Ca    8.4        02 Jan 2020 07:05  Ca    8.6        01 Jan 2020 07:22  Ca    8.6        31 Dec 2019 06:35    TPro  4.9    /  Alb  2.4    /  TBili  8.6    /  DBili  x      /  AST  26     /  ALT  17     /  AlkPhos  150    02 Jan 2020 07:05  TPro  5.1    /  Alb  2.5    /  TBili  8.7    /  DBili  x      /  AST  29     /  ALT  17     /  AlkPhos  180    01 Jan 2020 07:22  TPro  4.9    /  Alb  2.5    /  TBili  9.5    /  DBili  x      /  AST  29     /  ALT  18     /  AlkPhos  118    31 Dec 2019 06:35    PT/INR - ( 01 Jan 2020 10:02 )   PT: 23.7 sec;   INR: 2.02 ratio         PTT - ( 31 Dec 2019 09:17 )  PTT:42.1 sec

## 2020-01-03 LAB
ALBUMIN SERPL ELPH-MCNC: 2.3 G/DL — LOW (ref 3.3–5)
ALP SERPL-CCNC: 126 U/L — HIGH (ref 40–120)
ALT FLD-CCNC: 17 U/L — SIGNIFICANT CHANGE UP (ref 10–45)
ANION GAP SERPL CALC-SCNC: 8 MMOL/L — SIGNIFICANT CHANGE UP (ref 5–17)
APTT BLD: 40.2 SEC — HIGH (ref 27.5–36.3)
AST SERPL-CCNC: 24 U/L — SIGNIFICANT CHANGE UP (ref 10–40)
BILIRUB SERPL-MCNC: 8.8 MG/DL — HIGH (ref 0.2–1.2)
BUN SERPL-MCNC: 14 MG/DL — SIGNIFICANT CHANGE UP (ref 7–23)
CALCIUM SERPL-MCNC: 8.3 MG/DL — LOW (ref 8.4–10.5)
CHLORIDE SERPL-SCNC: 103 MMOL/L — SIGNIFICANT CHANGE UP (ref 96–108)
CO2 SERPL-SCNC: 25 MMOL/L — SIGNIFICANT CHANGE UP (ref 22–31)
CREAT SERPL-MCNC: 1.17 MG/DL — SIGNIFICANT CHANGE UP (ref 0.5–1.3)
GLUCOSE BLDC GLUCOMTR-MCNC: 112 MG/DL — HIGH (ref 70–99)
GLUCOSE BLDC GLUCOMTR-MCNC: 120 MG/DL — HIGH (ref 70–99)
GLUCOSE BLDC GLUCOMTR-MCNC: 159 MG/DL — HIGH (ref 70–99)
GLUCOSE BLDC GLUCOMTR-MCNC: 87 MG/DL — SIGNIFICANT CHANGE UP (ref 70–99)
GLUCOSE SERPL-MCNC: 91 MG/DL — SIGNIFICANT CHANGE UP (ref 70–99)
HCT VFR BLD CALC: 22.7 % — LOW (ref 39–50)
HGB BLD-MCNC: 7.5 G/DL — LOW (ref 13–17)
INR BLD: 2 RATIO — HIGH (ref 0.88–1.16)
MCHC RBC-ENTMCNC: 33 GM/DL — SIGNIFICANT CHANGE UP (ref 32–36)
MCHC RBC-ENTMCNC: 34.1 PG — HIGH (ref 27–34)
MCV RBC AUTO: 103.2 FL — HIGH (ref 80–100)
PLATELET # BLD AUTO: 73 K/UL — LOW (ref 150–400)
POTASSIUM SERPL-MCNC: 4 MMOL/L — SIGNIFICANT CHANGE UP (ref 3.5–5.3)
POTASSIUM SERPL-SCNC: 4 MMOL/L — SIGNIFICANT CHANGE UP (ref 3.5–5.3)
PROT SERPL-MCNC: 5.1 G/DL — LOW (ref 6–8.3)
PROTHROM AB SERPL-ACNC: 23.5 SEC — HIGH (ref 10–13.1)
RBC # BLD: 2.2 M/UL — LOW (ref 4.2–5.8)
RBC # FLD: 24.5 % — HIGH (ref 10.3–14.5)
SODIUM SERPL-SCNC: 136 MMOL/L — SIGNIFICANT CHANGE UP (ref 135–145)
WBC # BLD: 6.64 K/UL — SIGNIFICANT CHANGE UP (ref 3.8–10.5)
WBC # FLD AUTO: 6.64 K/UL — SIGNIFICANT CHANGE UP (ref 3.8–10.5)

## 2020-01-03 PROCEDURE — 99232 SBSQ HOSP IP/OBS MODERATE 35: CPT

## 2020-01-03 PROCEDURE — 73706 CT ANGIO LWR EXTR W/O&W/DYE: CPT | Mod: 26,LT

## 2020-01-03 PROCEDURE — 99232 SBSQ HOSP IP/OBS MODERATE 35: CPT | Mod: GC

## 2020-01-03 RX ORDER — INSULIN GLARGINE 100 [IU]/ML
24 INJECTION, SOLUTION SUBCUTANEOUS AT BEDTIME
Refills: 0 | Status: DISCONTINUED | OUTPATIENT
Start: 2020-01-03 | End: 2020-01-10

## 2020-01-03 RX ADMIN — Medication 1 MILLIGRAM(S): at 12:18

## 2020-01-03 RX ADMIN — Medication 325 MILLIGRAM(S): at 12:18

## 2020-01-03 RX ADMIN — TAMSULOSIN HYDROCHLORIDE 0.4 MILLIGRAM(S): 0.4 CAPSULE ORAL at 21:54

## 2020-01-03 RX ADMIN — Medication 20 MILLIGRAM(S): at 06:01

## 2020-01-03 RX ADMIN — PANTOPRAZOLE SODIUM 40 MILLIGRAM(S): 20 TABLET, DELAYED RELEASE ORAL at 17:53

## 2020-01-03 RX ADMIN — Medication 8 UNIT(S): at 17:52

## 2020-01-03 RX ADMIN — MIDODRINE HYDROCHLORIDE 20 MILLIGRAM(S): 2.5 TABLET ORAL at 06:01

## 2020-01-03 RX ADMIN — MIDODRINE HYDROCHLORIDE 20 MILLIGRAM(S): 2.5 TABLET ORAL at 12:17

## 2020-01-03 RX ADMIN — SPIRONOLACTONE 50 MILLIGRAM(S): 25 TABLET, FILM COATED ORAL at 06:12

## 2020-01-03 RX ADMIN — MIDODRINE HYDROCHLORIDE 20 MILLIGRAM(S): 2.5 TABLET ORAL at 17:53

## 2020-01-03 RX ADMIN — PANTOPRAZOLE SODIUM 40 MILLIGRAM(S): 20 TABLET, DELAYED RELEASE ORAL at 06:01

## 2020-01-03 RX ADMIN — Medication 8 UNIT(S): at 08:57

## 2020-01-03 RX ADMIN — Medication 0: at 08:57

## 2020-01-03 RX ADMIN — LACTULOSE 20 GRAM(S): 10 SOLUTION ORAL at 21:54

## 2020-01-03 RX ADMIN — Medication 0: at 12:15

## 2020-01-03 RX ADMIN — LACTULOSE 20 GRAM(S): 10 SOLUTION ORAL at 06:01

## 2020-01-03 RX ADMIN — LACTULOSE 20 GRAM(S): 10 SOLUTION ORAL at 14:58

## 2020-01-03 RX ADMIN — Medication 8 UNIT(S): at 12:16

## 2020-01-03 RX ADMIN — INSULIN GLARGINE 24 UNIT(S): 100 INJECTION, SOLUTION SUBCUTANEOUS at 21:52

## 2020-01-03 RX ADMIN — Medication 0: at 17:52

## 2020-01-03 NOTE — DIETITIAN INITIAL EVALUATION ADULT. - FACTORS AFF FOOD INTAKE
Pt reports good appetite, however with multiple food preferences. RD will honor as able./none none/Pt reports good appetite, however with multiple food preferences. RD will honor as able. Pt is not allergic to red meat or tomatoes and wants the restriction removed.

## 2020-01-03 NOTE — PROGRESS NOTE ADULT - ASSESSMENT
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 He was re-admitted from subacute rehab on 12/26/19 due to acute on chronic anemia without overt GI bleeding (found to have LLE hematoma), also with hepatic encephalopathy.    # Left leg swelling and pain      - Large 14cm left thigh hematoma  #Acute on chronic anemia     - acute component likely secondary to thigh hematoma above     - s/p EGD 12/30 with mild GAVE s/p APC treatment  # Decompensated JEAN cirrhosis, MELD-Na 26 on 1/2     - varices: grade I <5mm varices on EGD 12/30 not amenable to banding; also mild GAVE s/p APC      - SPE: none currently but waxes and wanes.  On lactulose and Xifaxan     - ascites: trace, not on diuretics     - HCC: none on CT this admission     - Transplant: Blood Type A positive, UNOS listed with updated MELD-Na of 27 last week    Recommendations:  - please obtain vascular consultation for evaluation of vascular compromise in left leg given decreased left pulses compared to right   - monitor exam for size of hematoma and s/sx of compartment syndrome  - PPI PO BID for GAVE  - lasix 20mg PO daily and spironolactone 50mg daily   - PT consultation - patient needs aggressive PT and to move around most of day  - nutrition consultation.  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 He was re-admitted from subacute rehab on 12/26/19 due to acute on chronic anemia without overt GI bleeding (found to have LLE hematoma), also with hepatic encephalopathy.    # Left leg swelling and pain      - Large 14cm left thigh hematoma  #Acute on chronic anemia     - acute component likely secondary to thigh hematoma above     - s/p EGD 12/30 with mild GAVE s/p APC treatment  # Decompensated JEAN cirrhosis, MELD-Na 26 on 1/3     - varices: grade I <5mm varices on EGD 12/30 not amenable to banding; also mild GAVE s/p APC      - SPE: none currently but waxes and wanes.  On lactulose and Xifaxan     - ascites: trace, not on diuretics     - HCC: none on CT this admission     - Transplant: Blood Type A positive, UNOS listed with updated MELD-Na of 27 last week    Recommendations:  - please obtain vascular consultation for evaluation of vascular compromise in left leg given decreased left pulses compared to right   - monitor exam for size of hematoma and s/sx of compartment syndrome  - PPI PO BID for GAVE  - lasix 20mg PO daily and spironolactone 50mg daily   - PT consultation - patient needs aggressive PT and to move around most of day  - nutrition consultation.  Low salt diet  - start Glucerna TID with meals given low albumin  - lactulose TID and Xifaxan BID for HE   - folate, iron supplementation   - daily CBC, CMP, INR

## 2020-01-03 NOTE — PROGRESS NOTE ADULT - ASSESSMENT
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 He is currently admitted for acute anemia and left lower extremity intramuscular hematoma s/p fall    JEAN Cirrhosis, MELD-Na 26 today without encephalopathy   - UNOS listed, will continue to follow   - Continue diuretics and low salt diet as per hepatology recommendation     Left lower extremity intramuscular hematoma   - monitor CBC and transfuse PRN to maintain HGB > 7.0, may need CTA if there is evidence of active bleeding   - no clinical evidence of compartment syndrome, serial exam; vascular following

## 2020-01-03 NOTE — PROGRESS NOTE ADULT - SUBJECTIVE AND OBJECTIVE BOX
SUBJECTIVE / OVERNIGHT EVENTS: pt seen and examined    MEDICATIONS  (STANDING):  dextrose 5%. 1000 milliLiter(s) (50 mL/Hr) IV Continuous <Continuous>  dextrose 50% Injectable 12.5 Gram(s) IV Push once  dextrose 50% Injectable 25 Gram(s) IV Push once  dextrose 50% Injectable 25 Gram(s) IV Push once  ferrous    sulfate 325 milliGRAM(s) Oral daily  folic acid 1 milliGRAM(s) Oral daily  furosemide    Tablet 20 milliGRAM(s) Oral daily  insulin glargine Injectable (LANTUS) 24 Unit(s) SubCutaneous at bedtime  insulin lispro (HumaLOG) corrective regimen sliding scale   SubCutaneous three times a day before meals  insulin lispro (HumaLOG) corrective regimen sliding scale   SubCutaneous at bedtime  insulin lispro Injectable (HumaLOG) 8 Unit(s) SubCutaneous three times a day before meals  lactulose Syrup 20 Gram(s) Oral three times a day  midodrine. 20 milliGRAM(s) Oral three times a day  pantoprazole    Tablet 40 milliGRAM(s) Oral two times a day  rifAXIMin 550 milliGRAM(s) Oral two times a day  spironolactone 50 milliGRAM(s) Oral daily  tamsulosin 0.4 milliGRAM(s) Oral at bedtime    MEDICATIONS  (PRN):  dextrose 40% Gel 15 Gram(s) Oral once PRN Blood Glucose LESS THAN 70 milliGRAM(s)/deciliter  glucagon  Injectable 1 milliGRAM(s) IntraMuscular once PRN Glucose LESS THAN 70 milligrams/deciliter    Vital Signs Last 24 Hrs  T(C): 37.3 (03 Jan 2020 21:08), Max: 37.3 (03 Jan 2020 21:08)  T(F): 99.1 (03 Jan 2020 21:08), Max: 99.1 (03 Jan 2020 21:08)  HR: 94 (03 Jan 2020 21:08) (77 - 94)  BP: 112/67 (03 Jan 2020 21:08) (100/56 - 112/67)  BP(mean): --  RR: 19 (03 Jan 2020 21:08) (18 - 19)  SpO2: 98% (03 Jan 2020 21:08) (97% - 99%)    Constitutional: No fever, fatigue  Skin: No rash.  Eyes: No recent vision problems or eye pain.  ENT: No congestion, ear pain, or sore throat.  Cardiovascular: No chest pain or palpation.  Respiratory: No cough, shortness of breath, congestion, or wheezing.  Gastrointestinal: No abdominal pain, nausea, vomiting, or diarrhea.  Genitourinary: No dysuria.  Musculoskeletal: No joint swelling.  Neurologic: No headache.    PHYSICAL EXAM:  GENERAL: NAD  EYES: EOMI, PERRLA  NECK: Supple, No JVD  CHEST/LUNG: dec breath sounds rt base  HEART:  S1 , S2 +  ABDOMEN: soft , bs+, mild distension+  EXTREMITIES: left > rt edema+  NEUROLOGY: alert awake oriented x place, person    LABS:  01-03    136  |  103  |  14  ----------------------------<  91  4.0   |  25  |  1.17    Ca    8.3<L>      03 Jan 2020 06:30    TPro  5.1<L>  /  Alb  2.3<L>  /  TBili  8.8<H>  /  DBili      /  AST  24  /  ALT  17  /  AlkPhos  126<H>  01-03    Creatinine Trend: 1.17 <--, 1.13 <--, 1.11 <--, 1.04 <--, 1.12 <--, 1.26 <--, 1.23 <--                        7.5    6.64  )-----------( 73       ( 03 Jan 2020 08:35 )             22.7     Urine Studies:      CARDIAC MARKERS ( 02 Jan 2020 12:12 )  x     / x     / 38 U/L / x     / x            LIVER FUNCTIONS - ( 03 Jan 2020 06:30 )  Alb: 2.3 g/dL / Pro: 5.1 g/dL / ALK PHOS: 126 U/L / ALT: 17 U/L / AST: 24 U/L / GGT: x           PT/INR - ( 03 Jan 2020 08:30 )   PT: 23.5 sec;   INR: 2.00 ratio         PTT - ( 03 Jan 2020 08:30 )  PTT:40.2 sec  Alb: 2.4 g/dL / Pro: 4.9 g/dL / ALK PHOS: 150 U/L / ALT: 17 U/L / AST: 26 U/L / GGT: x           PT/INR - ( 02 Jan 2020 08:22 )   PT: 25.2 sec;   INR: 2.16 ratio           Consultant(s) Notes Reviewed:      Care Discussed with Consultants/Other Providers:

## 2020-01-03 NOTE — DIETITIAN INITIAL EVALUATION ADULT. - PROBLEM SELECTOR PLAN 1
Cirrhotic patient with recurrent Hgb drop. Admitted for further evaluation.  - patient denies melena or hematochezia, FOBT negative, less concerning for active GIB. However, has hx of prior GAVE s/p apc and recurrent duodenal ulcer.  - currently running 2u pRBC transfusion for Hgb less than 7. Will follow up with post transfusion CBC.  - will start CTX for SBP ppx assuming ascites and GIB. Last paracentesis for ascites reportedly in 10/2019 at [facility]. Currently no significant ttp on exam and no worsening fluid wave/distention.  - c/w home dose PPI  - NPO for now.  - GI consulted  -check CTAP to assess for occult bleeding, splenomegaly, ascites  Pt with ongoing L leg pain but CT and US of LLE done as o/p today both nondx for hematoma.

## 2020-01-03 NOTE — CONSULT NOTE ADULT - ASSESSMENT
64M w/ PMH of GERD, esophagitis, T2DM on insulin, HLD, non-alcoholic cirrhosis, HFpEF, GIB, chronic thrombocytopenia, presented to the hospital from [facility] for low H/h, followed by Transplant surgery team for possible liver transplant, now found to have L thigh hematoma after fall 6 wks ago    - Please obtain CT angiogram of LLE to r/o active extrav  - Discussed w/ Vascular fellow on call    KERRY Coto PGY-2  Vascular  [id number]

## 2020-01-03 NOTE — DIETITIAN INITIAL EVALUATION ADULT. - PERTINENT LABORATORY DATA
01-03 @ 08:35: Hemoglobin 7.5<L>, Hematocrit 22.7<L>  01-03 @ 06:30: Sodium 136, Potassium 4.0, Chloride 103, Calcium 8.3<L>, BUN 14, Creatinine 1.17, BG 91, Alk Phos 126<H>, ALT/SGPT 17, AST/SGOT 24, Total Protein 5.1<L>, Albumin 2.3<L>, Total Bilirubin 8.8<H>

## 2020-01-03 NOTE — PROGRESS NOTE ADULT - SUBJECTIVE AND OBJECTIVE BOX
Chief Complaint:  Patient is a 64y old  Male who presents with a chief complaint of low Hgb (2020 09:27)      Interval Events: No adverse events overnight.  Patient feeling ok.  Did not walk much yesterday.     Allergies:  codeine (Anaphylaxis)  NO  RED MEAT (Unknown)      Hospital Medications:  dextrose 40% Gel 15 Gram(s) Oral once PRN  dextrose 5%. 1000 milliLiter(s) IV Continuous <Continuous>  dextrose 50% Injectable 12.5 Gram(s) IV Push once  dextrose 50% Injectable 25 Gram(s) IV Push once  dextrose 50% Injectable 25 Gram(s) IV Push once  ferrous    sulfate 325 milliGRAM(s) Oral daily  folic acid 1 milliGRAM(s) Oral daily  furosemide    Tablet 20 milliGRAM(s) Oral daily  glucagon  Injectable 1 milliGRAM(s) IntraMuscular once PRN  insulin glargine Injectable (LANTUS) 28 Unit(s) SubCutaneous at bedtime  insulin lispro (HumaLOG) corrective regimen sliding scale   SubCutaneous three times a day before meals  insulin lispro (HumaLOG) corrective regimen sliding scale   SubCutaneous at bedtime  insulin lispro Injectable (HumaLOG) 8 Unit(s) SubCutaneous three times a day before meals  lactulose Syrup 20 Gram(s) Oral three times a day  midodrine. 20 milliGRAM(s) Oral three times a day  pantoprazole    Tablet 40 milliGRAM(s) Oral two times a day  rifAXIMin 550 milliGRAM(s) Oral two times a day  spironolactone 50 milliGRAM(s) Oral daily  tamsulosin 0.4 milliGRAM(s) Oral at bedtime      PMHX/PSHX:  GIB (gastrointestinal bleeding)  GERD with esophagitis  Hepatic encephalopathy  Obesity  Fatty liver disease, nonalcoholic  Renal stones  Hypertension  Neuropathy  Hypercholesteremia  Diabetes  S/P cholecystectomy  No significant past surgical history      Family history:  Family history of type 2 diabetes mellitus  Family history of hypertension  Family history of stomach cancer  No pertinent family history in first degree relatives      ROS:     General:  No wt loss, fevers, chills, night sweats, fatigue,   Eyes:  Good vision, no reported pain  ENT:  No sore throat, pain, runny nose, dysphagia  CV:  No pain, palpitations, hypo/hypertension  Resp:  No dyspnea, cough, tachypnea, wheezing  GI:  See HPI  :  No pain, bleeding, incontinence, nocturia  Muscle:  No pain, weakness  Neuro:  No weakness, tingling, memory problems  Psych:  No fatigue, insomnia, mood problems, depression  Endocrine:  No polyuria, polydipsia, cold/heat intolerance  Heme:  No petechiae, ecchymosis, easy bruisability  Skin:  No rash, edema      PHYSICAL EXAM:     GENERAL: NAD  HEENT:  NC/AT, scleral icterus  CHEST:  Full & symmetric excursion, no increased effort  ABDOMEN:  Soft, non-tender, non-distended, +BS  EXTREMITIES:  L>R edema, decreased pulse left foot vs right foot  SKIN:  No rash  NEURO:  Alert      Vital Signs:  Vital Signs Last 24 Hrs  T(C): 37 (2020 04:56), Max: 37.4 (2020 21:08)  T(F): 98.6 (2020 04:56), Max: 99.3 (2020 21:08)  HR: 80 (2020 04:56) (77 - 83)  BP: 102/60 (2020 04:56) (102/60 - 125/70)  BP(mean): --  RR: 18 (2020 04:56) (18 - 18)  SpO2: 97% (2020 04:56) (96% - 98%)  Daily     Daily Weight in k (2020 07:57)    LABS:                        7.3    6.52  )-----------( 71       ( 2020 08:58 )             21.3     -03    136  |  103  |  14  ----------------------------<  91  4.0   |  25  |  1.17    Ca    8.3<L>      2020 06:30    TPro  5.1<L>  /  Alb  2.3<L>  /  TBili  8.8<H>  /  DBili  x   /  AST  24  /  ALT  17  /  AlkPhos  126<H>  -    LIVER FUNCTIONS - ( 2020 06:30 )  Alb: 2.3 g/dL / Pro: 5.1 g/dL / ALK PHOS: 126 U/L / ALT: 17 U/L / AST: 24 U/L / GGT: x           PT/INR - ( 2020 08:30 )   PT: 23.5 sec;   INR: 2.00 ratio         PTT - ( 2020 08:30 )  PTT:40.2 sec        Imaging:  reviewed

## 2020-01-03 NOTE — DIETITIAN INITIAL EVALUATION ADULT. - REASON INDICATOR FOR ASSESSMENT
Nutrition Assessment warranted for length of stay. Pt is UNOS listed at [facility] for Liver Txp. MELD-Na 26 (1/3/20)  Information obtained from: patient, medical record. Pt is known to this RD.  Per chart: Pt with decompensated JEAN cirrhosis complicated by ascites, history of SBP, hepatic encephalopathy, and chronic anemia; now dmitted for acute anemia and left lower extremity intramuscular hematoma s/p fall. Nutrition Consult for assessment/education received and appreciated.  Pt is UNOS listed at [facility] for Liver Txp. MELD-Na 26 (1/3/20)  Information obtained from: patient, medical record. Pt is known to this RD.  Per chart: Pt with decompensated JEAN cirrhosis complicated by ascites, history of SBP, hepatic encephalopathy, and chronic anemia; now dmitted for acute anemia and left lower extremity intramuscular hematoma s/p fall.

## 2020-01-03 NOTE — DIETITIAN INITIAL EVALUATION ADULT. - PERTINENT MEDS FT
MEDICATIONS  (STANDING):  dextrose 5%. 1000 milliLiter(s) (50 mL/Hr) IV Continuous <Continuous>  dextrose 50% Injectable 12.5 Gram(s) IV Push once  dextrose 50% Injectable 25 Gram(s) IV Push once  dextrose 50% Injectable 25 Gram(s) IV Push once  ferrous    sulfate 325 milliGRAM(s) Oral daily  folic acid 1 milliGRAM(s) Oral daily  furosemide    Tablet 20 milliGRAM(s) Oral daily  insulin glargine Injectable (LANTUS) 24 Unit(s) SubCutaneous at bedtime  insulin lispro (HumaLOG) corrective regimen sliding scale   SubCutaneous three times a day before meals  insulin lispro (HumaLOG) corrective regimen sliding scale   SubCutaneous at bedtime  insulin lispro Injectable (HumaLOG) 8 Unit(s) SubCutaneous three times a day before meals  lactulose Syrup 20 Gram(s) Oral three times a day  midodrine. 20 milliGRAM(s) Oral three times a day  pantoprazole    Tablet 40 milliGRAM(s) Oral two times a day  rifAXIMin 550 milliGRAM(s) Oral two times a day  spironolactone 50 milliGRAM(s) Oral daily  tamsulosin 0.4 milliGRAM(s) Oral at bedtime

## 2020-01-03 NOTE — DIETITIAN INITIAL EVALUATION ADULT. - PROBLEM SELECTOR PLAN 8
- DVT ppx: SCD in setting of possible GIB?  - Diet: NPO for now  - Transitions of Care Status:  1.  Name of PCP: Dr. Coto  2.  PCP Contacted on Admission: [ ] Y    [ x] N  - night admission  3.  PCP contacted at Discharge: [ ] Y    [ ] N    [ ] N/A  4.  Post-Discharge Appointment Date and Location:  5.  Summary of Handoff given to PCP:

## 2020-01-03 NOTE — PROGRESS NOTE ADULT - SUBJECTIVE AND OBJECTIVE BOX
[facility] - Sushila Dhaliwal, Doug, Morenita, Ayo, Colleen Williamson  Office Number:  [phone]    Patient resting comfortably in bed in NAD.  Laying flat with no respiratory distress.  No complaints of chest pain, dyspnea, palpitations, PND, or orthopnea.    ROS: negative unless otherwise mentioned.    Telemetry:  off    MEDICATIONS  (STANDING):  dextrose 5%. 1000 milliLiter(s) (50 mL/Hr) IV Continuous <Continuous>  dextrose 50% Injectable 12.5 Gram(s) IV Push once  dextrose 50% Injectable 25 Gram(s) IV Push once  dextrose 50% Injectable 25 Gram(s) IV Push once  ferrous    sulfate 325 milliGRAM(s) Oral daily  folic acid 1 milliGRAM(s) Oral daily  furosemide    Tablet 20 milliGRAM(s) Oral daily  insulin glargine Injectable (LANTUS) 28 Unit(s) SubCutaneous at bedtime  insulin lispro (HumaLOG) corrective regimen sliding scale   SubCutaneous three times a day before meals  insulin lispro (HumaLOG) corrective regimen sliding scale   SubCutaneous at bedtime  insulin lispro Injectable (HumaLOG) 8 Unit(s) SubCutaneous three times a day before meals  lactulose Syrup 20 Gram(s) Oral three times a day  midodrine. 20 milliGRAM(s) Oral three times a day  pantoprazole    Tablet 40 milliGRAM(s) Oral two times a day  rifAXIMin 550 milliGRAM(s) Oral two times a day  spironolactone 50 milliGRAM(s) Oral daily  tamsulosin 0.4 milliGRAM(s) Oral at bedtime    MEDICATIONS  (PRN):  dextrose 40% Gel 15 Gram(s) Oral once PRN Blood Glucose LESS THAN 70 milliGRAM(s)/deciliter  glucagon  Injectable 1 milliGRAM(s) IntraMuscular once PRN Glucose LESS THAN 70 milligrams/deciliter      Allergies    codeine (Anaphylaxis)    Intolerances    NO  RED MEAT (Unknown)      Vital Signs Last 24 Hrs  T(C): 37 (03 Jan 2020 04:56), Max: 37.4 (02 Jan 2020 21:08)  T(F): 98.6 (03 Jan 2020 04:56), Max: 99.3 (02 Jan 2020 21:08)  HR: 80 (03 Jan 2020 04:56) (77 - 83)  BP: 102/60 (03 Jan 2020 04:56) (102/60 - 125/70)  BP(mean): --  RR: 18 (03 Jan 2020 04:56) (18 - 18)  SpO2: 97% (03 Jan 2020 04:56) (96% - 98%)    I&O's Summary    02 Jan 2020 07:01  -  03 Jan 2020 07:00  --------------------------------------------------------  IN: 716 mL / OUT: 1900 mL / NET: -1184 mL    03 Jan 2020 07:01  -  03 Jan 2020 09:27  --------------------------------------------------------  IN: 0 mL / OUT: 600 mL / NET: -600 mL        ON EXAM:    Constitutional: NAD, awake    HEENT: Moist Mucous Membranes, Anicteric  Pulmonary: Decreased breath sounds b/l. No rales, crackles or wheeze appreciated.   Cardiovascular: Regular, S1 and S2, No murmurs, rubs, gallops or clicks  Gastrointestinal: Bowel Sounds present, soft, nontender.   Lymph: L>R edema in lower ext  Skin: No visible rashes or ulcers.  Psych:  Mood & affect appropriate for situation    LABS: All Labs Reviewed:                        7.3    6.52  )-----------( 71       ( 02 Jan 2020 08:58 )             21.3                         7.1    5.10  )-----------( 73       ( 01 Jan 2020 09:54 )             21.6     03 Jan 2020 06:30    136    |  103    |  14     ----------------------------<  91     4.0     |  25     |  1.17   02 Jan 2020 07:05    135    |  103    |  15     ----------------------------<  150    4.0     |  23     |  1.13   01 Jan 2020 07:22    135    |  102    |  17     ----------------------------<  175    4.0     |  23     |  1.11     Ca    8.3        03 Jan 2020 06:30  Ca    8.4        02 Jan 2020 07:05  Ca    8.6        01 Jan 2020 07:22    TPro  5.1    /  Alb  2.3    /  TBili  8.8    /  DBili  x      /  AST  24     /  ALT  17     /  AlkPhos  126    03 Jan 2020 06:30  TPro  4.9    /  Alb  2.4    /  TBili  8.6    /  DBili  x      /  AST  26     /  ALT  17     /  AlkPhos  150    02 Jan 2020 07:05  TPro  5.1    /  Alb  2.5    /  TBili  8.7    /  DBili  x      /  AST  29     /  ALT  17     /  AlkPhos  180    01 Jan 2020 07:22    PT/INR - ( 03 Jan 2020 08:30 )   PT: 23.5 sec;   INR: 2.00 ratio         PTT - ( 03 Jan 2020 08:30 )  PTT:40.2 sec  CARDIAC MARKERS ( 02 Jan 2020 12:12 )  x     / x     / 38 U/L / x     / x          Blood Culture:

## 2020-01-03 NOTE — DIETITIAN INITIAL EVALUATION ADULT. - PROBLEM SELECTOR PLAN 2
Recurrent Anemia requiring transfusions.  - GIB workup as above.   - 12/22 Retic percentage - 4.1: Retic index = 0.77 concerning for hypoproliferation.  - B12 and folate normal in Dec 6 workup.   - Iron studies c/w anemia of chronic disease.  - haptoglobin was low yet other hemolysis labs wnl in the past.   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   - will order repeat hemolysis labs.  - CT A/P to r/o other sources of bleeding.  - heme/onc consult in AM  - hepatology consulted

## 2020-01-03 NOTE — DIETITIAN INITIAL EVALUATION ADULT. - PROBLEM SELECTOR PLAN 3
Cirrhosis 2/2 JEAN c/b hepatic encephalopathy and SBP in the past.  - On transplant list  - last EUS on 5/3/2019 w/o esophageal varices.  - hepatology consult in AM  - daily labs for MELD-Na score  - MELD-Na score on admission: 27.  - c/w lactulose and rifaximin.  - Strict I/O, daily weight.  - hypervolemic on exam: consider diuretics after fluid status assessment post transfusion.

## 2020-01-03 NOTE — PROGRESS NOTE ADULT - SUBJECTIVE AND OBJECTIVE BOX
INTERVAL HPI/OVERNIGHT EVENTS:    pt seen and examined  he denies abdominal pain, n/v  tolerating PO   +normal, brown BM overnight as per patient     MEDICATIONS  (STANDING):  dextrose 5%. 1000 milliLiter(s) (50 mL/Hr) IV Continuous <Continuous>  dextrose 50% Injectable 12.5 Gram(s) IV Push once  dextrose 50% Injectable 25 Gram(s) IV Push once  dextrose 50% Injectable 25 Gram(s) IV Push once  ferrous    sulfate 325 milliGRAM(s) Oral daily  folic acid 1 milliGRAM(s) Oral daily  furosemide    Tablet 20 milliGRAM(s) Oral daily  insulin glargine Injectable (LANTUS) 28 Unit(s) SubCutaneous at bedtime  insulin lispro (HumaLOG) corrective regimen sliding scale   SubCutaneous three times a day before meals  insulin lispro (HumaLOG) corrective regimen sliding scale   SubCutaneous at bedtime  insulin lispro Injectable (HumaLOG) 8 Unit(s) SubCutaneous three times a day before meals  lactulose Syrup 20 Gram(s) Oral three times a day  midodrine. 20 milliGRAM(s) Oral three times a day  pantoprazole    Tablet 40 milliGRAM(s) Oral two times a day  rifAXIMin 550 milliGRAM(s) Oral two times a day  spironolactone 50 milliGRAM(s) Oral daily  tamsulosin 0.4 milliGRAM(s) Oral at bedtime    MEDICATIONS  (PRN):  dextrose 40% Gel 15 Gram(s) Oral once PRN Blood Glucose LESS THAN 70 milliGRAM(s)/deciliter  glucagon  Injectable 1 milliGRAM(s) IntraMuscular once PRN Glucose LESS THAN 70 milligrams/deciliter      Allergies    codeine (Anaphylaxis)    Intolerances    NO  RED MEAT (Unknown)      Review of Systems:    General:  No wt loss, fevers, chills, night sweats,fatigue,   Eyes:  Good vision, no reported pain  ENT:  No sore throat, pain, runny nose, dysphagia  CV:  No pain, palpitations, hypo/hypertension  Resp:  No dyspnea, cough, tachypnea, wheezing  GI:  No pain, No nausea, No vomiting, No diarrhea, No constipation, No weight loss, No fever, No pruritis, No rectal bleeding, No melena, No dysphagia  :  No pain, bleeding, incontinence, nocturia  Muscle:  No pain, weakness  Neuro:  No weakness, tingling, memory problems  Psych:  No fatigue, insomnia, mood problems, depression  Endocrine:  No polyuria, polydypsia, cold/heat intolerance  Heme:  No petechiae, ecchymosis, easy bruisability  Skin:  No rash, tattoos, scars, edema      Vital Signs Last 24 Hrs  T(C): 36.6 (02 Jan 2020 04:49), Max: 37.8 (01 Jan 2020 21:35)  T(F): 97.9 (02 Jan 2020 04:49), Max: 100 (01 Jan 2020 21:35)  HR: 86 (02 Jan 2020 04:49) (86 - 92)  BP: 112/63 (02 Jan 2020 04:49) (109/67 - 114/51)  BP(mean): --  RR: 18 (02 Jan 2020 04:49) (18 - 18)  SpO2: 97% (02 Jan 2020 04:49) (93% - 97%)    PHYSICAL EXAM:    Constitutional: NAD, well-developed  HEENT: EOMI, throat clear  Gastrointestinal: obese, BS+, soft, NT/ND, neg HSM,  Extremities: LLE tenderness with swelling   Neurological: A/O x 3, no focal deficits      LABS:                        7.3    6.52  )-----------( 71       ( 02 Jan 2020 08:58 )             21.3     01-02    135  |  103  |  15  ----------------------------<  150<H>  4.0   |  23  |  1.13    Ca    8.4      02 Jan 2020 07:05    TPro  4.9<L>  /  Alb  2.4<L>  /  TBili  8.6<H>  /  DBili  x   /  AST  26  /  ALT  17  /  AlkPhos  150<H>  01-02    PT/INR - ( 02 Jan 2020 08:22 )   PT: 25.2 sec;   INR: 2.16 ratio               RADIOLOGY & ADDITIONAL TESTS:

## 2020-01-03 NOTE — PROGRESS NOTE ADULT - ASSESSMENT
63 year old male with HTN, DM2, non-alcoholic cirrhosis, who was recently admitted with altered mental status, hyponatremia and high ammonia level. We last saw him during a hospitalization in 10/2019, during which he was significantly volume overloaded and required iv diuresis.  He is currently being evaluated for liver transplant.  He returns last week with acute blood loss anemia. CT with trace pleural effusions, anasarca, and atherosclerotic calcifications.  EGD yesterday with mild gave s/p APC treatment    Abnormal EKG  - His EKGs have demonstrated prolonged qtc in the past  - repeat ekg shows normal QT  - avoid qtc prolonging medications and replete K to greater than 4 and mg>2     Diastolic HF   - CXR is clear and CT with only trace effusions. Though still with lower ext edema  - watch volume with PRBC transfusions  - he does have some le edema in the setting of his hypoalbuminemia and diastolic dysfunction. Can increase lasix to 40 mg po daily if no improvement.  - TTE normal LV systolic function EF 65%; LVH, DD Grade I  - cath with normal filling pressures and no significant cad.  - no asa in setting of acute blood loss anemia.    HTN  - BP on softer side  - hold anti-hypertensives  - midodrine as needed    Anemia  - transfuse to keep Hb >8. trend H/H  - GI follow up    - All other workup as per primary team

## 2020-01-03 NOTE — CONSULT NOTE ADULT - SUBJECTIVE AND OBJECTIVE BOX
Vascular Surgery Consult  Consulting surgical team: Vascular Surgery  Consulting attending: Dr. Ruiz    HPI: 64M w/ PMH of GERD, esophagitis, T2DM on insulin, HLD, non-alcoholic cirrhosis, HFpEF, GIB, chronic thrombocytopenia, presented to the hospital from [facility] for low H/h, (Of note, previous admission about a week ago before this admission, pt p/w confusion, weakness and MISA w/ cCr of 2.55, and hepatic encephalopathy). Pt's been admitted under medical service, with multiple teams involved for management, including transplant surgery for possible liver transplant. Pt has been complaining of swelling in L thigh, and CT LLE w/ noncont obtained showing 4.x3x14 intramuscular hematoma. Vascular Surgery consulted to evaluate for active extrav causing the hematoma.    Upon interview and exam, pt states he fell about 6 weeks ago, landing on his left side, and then swelling was noted started about 3 weeks ago, and has been worsening slowly. Otherwise, does not elicit pain or symptoms other than discomfort from swelling.      PAST MEDICAL HISTORY:  GIB (gastrointestinal bleeding)  GERD with esophagitis  Hepatic encephalopathy  Obesity  Fatty liver disease, nonalcoholic  Renal stones  Hypertension  Neuropathy  Hypercholesteremia  Diabetes      PAST SURGICAL HISTORY:  S/P cholecystectomy  No significant past surgical history      MEDICATIONS:  dextrose 40% Gel 15 Gram(s) Oral once PRN  dextrose 5%. 1000 milliLiter(s) IV Continuous <Continuous>  dextrose 50% Injectable 12.5 Gram(s) IV Push once  dextrose 50% Injectable 25 Gram(s) IV Push once  dextrose 50% Injectable 25 Gram(s) IV Push once  ferrous    sulfate 325 milliGRAM(s) Oral daily  folic acid 1 milliGRAM(s) Oral daily  furosemide    Tablet 20 milliGRAM(s) Oral daily  glucagon  Injectable 1 milliGRAM(s) IntraMuscular once PRN  insulin glargine Injectable (LANTUS) 28 Unit(s) SubCutaneous at bedtime  insulin lispro (HumaLOG) corrective regimen sliding scale   SubCutaneous three times a day before meals  insulin lispro (HumaLOG) corrective regimen sliding scale   SubCutaneous at bedtime  insulin lispro Injectable (HumaLOG) 8 Unit(s) SubCutaneous three times a day before meals  lactulose Syrup 20 Gram(s) Oral three times a day  midodrine. 20 milliGRAM(s) Oral three times a day  pantoprazole    Tablet 40 milliGRAM(s) Oral two times a day  rifAXIMin 550 milliGRAM(s) Oral two times a day  spironolactone 50 milliGRAM(s) Oral daily  tamsulosin 0.4 milliGRAM(s) Oral at bedtime      ALLERGIES:  codeine (Anaphylaxis)  NO  RED MEAT (Unknown)      VITALS & I/Os:  Vital Signs Last 24 Hrs  T(C): 37 (03 Jan 2020 04:56), Max: 37.4 (02 Jan 2020 21:08)  T(F): 98.6 (03 Jan 2020 04:56), Max: 99.3 (02 Jan 2020 21:08)  HR: 80 (03 Jan 2020 04:56) (77 - 83)  BP: 102/60 (03 Jan 2020 04:56) (102/60 - 125/70)  BP(mean): --  RR: 18 (03 Jan 2020 04:56) (18 - 18)  SpO2: 97% (03 Jan 2020 04:56) (96% - 98%)    I&O's Summary    02 Jan 2020 07:01  -  03 Jan 2020 07:00  --------------------------------------------------------  IN: 716 mL / OUT: 1900 mL / NET: -1184 mL    03 Jan 2020 07:01  -  03 Jan 2020 10:59  --------------------------------------------------------  IN: 0 mL / OUT: 600 mL / NET: -600 mL        PHYSICAL EXAM:  General: No acute distress, obese  Respiratory: Nonlabored  Cardiovascular: RRR  Abdominal: Soft, nondistended, nontender. No rebound or guarding. No organomegaly, no palpable mass.  Extremities: Warm, b/l femoral pulses palpable, b/l DP palpable, L thigh noticeably larger than the left, nontender, nonerythematous, equal temperature b/l    LABS:                        7.5    6.64  )-----------( 73       ( 03 Jan 2020 08:35 )             22.7     01-03    136  |  103  |  14  ----------------------------<  91  4.0   |  25  |  1.17    Ca    8.3<L>      03 Jan 2020 06:30    TPro  5.1<L>  /  Alb  2.3<L>  /  TBili  8.8<H>  /  DBili  x   /  AST  24  /  ALT  17  /  AlkPhos  126<H>  01-03    Lactate:    PT/INR - ( 03 Jan 2020 08:30 )   PT: 23.5 sec;   INR: 2.00 ratio         PTT - ( 03 Jan 2020 08:30 )  PTT:40.2 sec    CARDIAC MARKERS ( 02 Jan 2020 12:12 )  x     / x     / 38 U/L / x     / x          IMAGING:    < from: CT Lower Extremity No Cont, Left (12.31.19 @ 15:34) >    EXAM:  CT LWR EXT LT                            PROCEDURE DATE:  12/31/2019            INTERPRETATION:    CT OF THE LEFT LOWER EXTREMITY    CLINICAL INFORMATION: Pain and swelling of the left thigh.    TECHNIQUE: CT of the left lower extremity was performed from the level of the hip to the level of the knee. The study was performed without the use of intravenous or intra-articular contrast. Multiplanar reformats were generated for review.     COMPARISON: CT of the left femur 24 December 2019. CT of the abdomen and pelvis 27 December 2019.    FINDINGS:    BONE: No acute fracture. No focal lytic or blastic lesions. No cortical destruction or new periosteal bone formation. Enthesopathy at the attachment of the gluteal tendons on the greater trochanter. No dislocation. Multilevel spondylosis and facet arthropathy of the lower lumbar spine. Partial ankylosis of the left SI joint. Degenerative changes of the pubic symphysis. Cartilage space loss with marginal osteophyte formation of the left hip and knee joints. Small left knee joint effusion. No hip joint effusion.    SOFT TISSUE: Intramuscular hematoma is identified in the vastus intermedius measuring approximately 43 x 30 x 140 mm. The hematoma is new when compared to prior CT. Hematoma causes mild expansion of the vastus intermedialis muscle. No additional focal collections are identified. No focal muscle atrophy. Normal CT appearance of the imaged tendons. Vascular calcifications. Vascular structures are otherwise normal in course and caliber. Edema in the subcutaneous fat along the anterior and lateral aspects of the mid to distal thigh. Mild skin thickening in the distal thigh. Nonobstructive renal calculus in the left kidney. Trace pelvic free fluid. No pelvic lymphadenopathy.      IMPRESSION:  1.  New intramuscular hematoma in the vastus intermedialis measuring 43 x 30 x 140 mm.  2.  CT is otherwise similar in appearance to prior examination.                    CESIA GOLDSTEIN M.D., ATTENDING RADIOLOGIST  This document has been electronically signed. Dec 31 2019  4:04PM                < end of copied text > Vascular Surgery Consult  Consulting surgical team: Vascular Surgery  Consulting attending: Dr. Ruiz    HPI: 64M w/ PMH of GERD, esophagitis, T2DM on insulin, HLD, non-alcoholic cirrhosis, HFpEF, GIB, chronic thrombocytopenia, presented to the hospital from [facility] for low H/h, (Of note, previous admission about a week ago before this admission, pt p/w confusion, weakness and MISA w/ cCr of 2.55, and hepatic encephalopathy). Pt's been admitted under medical service, with multiple teams involved for management, including transplant surgery for possible liver transplant. Pt has been complaining of swelling in L thigh, and CT LLE w/ noncont obtained showing 4.x3x14 intramuscular hematoma. Vascular Surgery consulted to evaluate for active extrav causing the hematoma.    Upon interview and exam, pt states he fell about 6 weeks ago, landing on his left side, and then swelling was noted started about 3 weeks ago, and has been worsening slowly. Otherwise, does not elicit pain or symptoms other than discomfort from swelling.      PAST MEDICAL HISTORY:  GIB (gastrointestinal bleeding)  GERD with esophagitis  Hepatic encephalopathy  Obesity  Fatty liver disease, nonalcoholic  Renal stones  Hypertension  Neuropathy  Hypercholesteremia  Diabetes      PAST SURGICAL HISTORY:  S/P cholecystectomy  No significant past surgical history      MEDICATIONS:  dextrose 40% Gel 15 Gram(s) Oral once PRN  dextrose 5%. 1000 milliLiter(s) IV Continuous <Continuous>  dextrose 50% Injectable 12.5 Gram(s) IV Push once  dextrose 50% Injectable 25 Gram(s) IV Push once  dextrose 50% Injectable 25 Gram(s) IV Push once  ferrous    sulfate 325 milliGRAM(s) Oral daily  folic acid 1 milliGRAM(s) Oral daily  furosemide    Tablet 20 milliGRAM(s) Oral daily  glucagon  Injectable 1 milliGRAM(s) IntraMuscular once PRN  insulin glargine Injectable (LANTUS) 28 Unit(s) SubCutaneous at bedtime  insulin lispro (HumaLOG) corrective regimen sliding scale   SubCutaneous three times a day before meals  insulin lispro (HumaLOG) corrective regimen sliding scale   SubCutaneous at bedtime  insulin lispro Injectable (HumaLOG) 8 Unit(s) SubCutaneous three times a day before meals  lactulose Syrup 20 Gram(s) Oral three times a day  midodrine. 20 milliGRAM(s) Oral three times a day  pantoprazole    Tablet 40 milliGRAM(s) Oral two times a day  rifAXIMin 550 milliGRAM(s) Oral two times a day  spironolactone 50 milliGRAM(s) Oral daily  tamsulosin 0.4 milliGRAM(s) Oral at bedtime      ALLERGIES:  codeine (Anaphylaxis)  NO  RED MEAT (Unknown)      VITALS & I/Os:  Vital Signs Last 24 Hrs  T(C): 37 (03 Jan 2020 04:56), Max: 37.4 (02 Jan 2020 21:08)  T(F): 98.6 (03 Jan 2020 04:56), Max: 99.3 (02 Jan 2020 21:08)  HR: 80 (03 Jan 2020 04:56) (77 - 83)  BP: 102/60 (03 Jan 2020 04:56) (102/60 - 125/70)  BP(mean): --  RR: 18 (03 Jan 2020 04:56) (18 - 18)  SpO2: 97% (03 Jan 2020 04:56) (96% - 98%)    I&O's Summary    02 Jan 2020 07:01  -  03 Jan 2020 07:00  --------------------------------------------------------  IN: 716 mL / OUT: 1900 mL / NET: -1184 mL    03 Jan 2020 07:01  -  03 Jan 2020 10:59  --------------------------------------------------------  IN: 0 mL / OUT: 600 mL / NET: -600 mL        PHYSICAL EXAM:  General: No acute distress, obese  Respiratory: Nonlabored  Cardiovascular: RRR  Abdominal: Soft, nondistended, nontender. No rebound or guarding. No organomegaly, no palpable mass.  Extremities: Warm, b/l femoral pulses palpable, b/l DP palpable, L thigh noticeably larger than the left, nontender, nonerythematous, equal temperature b/l    LABS:                        7.5    6.64  )-----------( 73       ( 03 Jan 2020 08:35 )             22.7     01-03    136  |  103  |  14  ----------------------------<  91  4.0   |  25  |  1.17    Ca    8.3<L>      03 Jan 2020 06:30    TPro  5.1<L>  /  Alb  2.3<L>  /  TBili  8.8<H>  /  DBili  x   /  AST  24  /  ALT  17  /  AlkPhos  126<H>  01-03    Lactate:    PT/INR - ( 03 Jan 2020 08:30 )   PT: 23.5 sec;   INR: 2.00 ratio         PTT - ( 03 Jan 2020 08:30 )  PTT:40.2 sec    CARDIAC MARKERS ( 02 Jan 2020 12:12 )  x     / x     / 38 U/L / x     / x          IMAGING:    < from: CT Lower Extremity No Cont, Left (12.31.19 @ 15:34) >    EXAM:  CT LWR EXT LT                            PROCEDURE DATE:  12/31/2019            INTERPRETATION:    CT OF THE LEFT LOWER EXTREMITY    CLINICAL INFORMATION: Pain and swelling of the left thigh.    TECHNIQUE: CT of the left lower extremity was performed from the level of the hip to the level of the knee. The study was performed without the use of intravenous or intra-articular contrast. Multiplanar reformats were generated for review.     COMPARISON: CT of the left femur 24 December 2019. CT of the abdomen and pelvis 27 December 2019.    FINDINGS:    BONE: No acute fracture. No focal lytic or blastic lesions. No cortical destruction or new periosteal bone formation. Enthesopathy at the attachment of the gluteal tendons on the greater trochanter. No dislocation. Multilevel spondylosis and facet arthropathy of the lower lumbar spine. Partial ankylosis of the left SI joint. Degenerative changes of the pubic symphysis. Cartilage space loss with marginal osteophyte formation of the left hip and knee joints. Small left knee joint effusion. No hip joint effusion.    SOFT TISSUE: Intramuscular hematoma is identified in the vastus intermedius measuring approximately 43 x 30 x 140 mm. The hematoma is new when compared to prior CT. Hematoma causes mild expansion of the vastus intermedialis muscle. No additional focal collections are identified. No focal muscle atrophy. Normal CT appearance of the imaged tendons. Vascular calcifications. Vascular structures are otherwise normal in course and caliber. Edema in the subcutaneous fat along the anterior and lateral aspects of the mid to distal thigh. Mild skin thickening in the distal thigh. Nonobstructive renal calculus in the left kidney. Trace pelvic free fluid. No pelvic lymphadenopathy.      IMPRESSION:  1.  New intramuscular hematoma in the vastus intermedialis measuring 43 x 30 x 140 mm.  2.  CT is otherwise similar in appearance to prior examination.            CESIA GOLDSTEIN M.D., ATTENDING RADIOLOGIST  This document has been electronically signed. Dec 31 2019  4:04PM            < end of copied text >

## 2020-01-03 NOTE — DIETITIAN INITIAL EVALUATION ADULT. - PHYSICAL APPEARANCE
obese/other (specify)/ht: 6 feet 1 inches, admit wt: 270 pounds, BMI: 35.6 Kg/m2, IBW: 184 pounds (+/- 10%), 147% IBW/well nourished Edema: 1+ bilateral ankles, 2+ bilateral legs  Skin: no pressure injuries noted per nursing flowsheet  Nutrition Focused Physical Exam: Pt appears well developed, obese, with no signs of muscle or fat depletion.

## 2020-01-03 NOTE — PROGRESS NOTE ADULT - SUBJECTIVE AND OBJECTIVE BOX
Transplant Surgery Daily Progress Note    SUBJECTIVE: Patient seen and examined during the morning round with Dr. Alonzo and hepatology team led by Dr. Cano, no over night event, limited ambulation; no encephalopathy.      OBJECTIVE:   Vital Signs Last 24 Hrs  Vital Signs Last 24 Hrs  T(C): 37 (03 Jan 2020 04:56), Max: 37.4 (02 Jan 2020 21:08)  T(F): 98.6 (03 Jan 2020 04:56), Max: 99.3 (02 Jan 2020 21:08)  HR: 80 (03 Jan 2020 04:56) (77 - 83)  BP: 102/60 (03 Jan 2020 04:56) (102/60 - 125/70)  BP(mean): --  RR: 18 (03 Jan 2020 04:56) (18 - 18)  SpO2: 97% (03 Jan 2020 04:56) (96% - 98%)    PHYSICAL EXAM:  Constitutional: resting in bed with no acute distress  Respiratory:  unlabored breathing  Gastrointestinal: Abdomen soft, non distended, non tenderness  Extremities: left lower extremity  with swelling, palpable pedal pulses, left DP > right, compartments soft   Neruo: AO x 3, responding appropriately, bilateral upper and lower extremity motor/sensory intact     LABS:   CBC (01-03 @ 08:35)                          7.5<L>                   6.64    )--------------(  73<L>      --    % Neuts, --    % Lymphs, ANC: --                              22.7<L>  CBC (01-02 @ 08:58)                          7.3<L>                   6.52    )--------------(  71<L>      --    % Neuts, --    % Lymphs, ANC: --                              21.3<L>    BMP (01-03 @ 06:30)       136     |  103     |  14    			Ca++ --      Ca 8.3<L>       ---------------------------------( 91    		Mg --           4.0     |  25      |  1.17  			Ph --      BMP (01-02 @ 07:05)       135     |  103     |  15    			Ca++ --      Ca 8.4          ---------------------------------( 150<H>		Mg --           4.0     |  23      |  1.13  			Ph --        LFTs (01-03 @ 06:30)      TPro 5.1<L> / Alb 2.3<L> / TBili 8.8<H> / DBili -- / AST 24 / ALT 17 / AlkPhos 126<H>  LFTs (01-02 @ 07:05)      TPro 4.9<L> / Alb 2.4<L> / TBili 8.6<H> / DBili -- / AST 26 / ALT 17 / AlkPhos 150<H>    Coags (01-03 @ 08:30)  aPTT 40.2<H> / INR 2.00<H> / PT 23.5<H>  Coags (01-02 @ 08:22)  aPTT -- / INR 2.16<H> / PT 25.2<H>    Cardiac Markers (01-02 @ 12:12)     Trop: -- -- / CKMB: -- / CK: 38    RADIOLOGY & ADDITIONAL STUDIES:   EXAM:  CT LWR EXT LT                        PROCEDURE DATE:  12/31/2019      FINDINGS:  BONE: No acute fracture. No focal lytic or blastic lesions. No cortical destruction or new periosteal bone formation. Enthesopathy at the attachment of the gluteal tendons on the greater trochanter. No dislocation. Multilevel spondylosis and facet arthropathy of the lower lumbar spine. Partial ankylosis of the left SI joint. Degenerative changes of the pubic symphysis. Cartilage space loss with marginal osteophyte formation of the left hip and knee joints. Small left knee joint effusion. No hip joint effusion.    SOFT TISSUE: Intramuscular hematoma is identified in the vastus intermedius measuring approximately 43 x 30 x 140 mm. The hematoma is new when compared to prior CT. Hematoma causes mild expansion of the vastus intermedialis muscle. No additional focal collections are identified. No focal muscle atrophy. Normal CT appearance of the imaged tendons. Vascular calcifications. Vascular structures are otherwise normal in course and caliber. Edema in the subcutaneous fat along the anterior and lateral aspects of the mid to distal thigh. Mild skin thickening in the distal thigh. Nonobstructive renal calculus in the left kidney. Trace pelvic free fluid. No pelvic lymphadenopathy.    IMPRESSION:  1.  New intramuscular hematoma in the vastus intermedialis measuring 43 x 30 x 140 mm.  2.  CT is otherwise similar in appearance to prior examination.

## 2020-01-03 NOTE — CONSULT NOTE ADULT - ATTENDING COMMENTS
I have seen Mr. Cooper this morning. Essentially, he had what looks like a vasovagal or syncopal episode. I have discussed with him the finding from the last operation I did on him to restore flow to his left leg and also the TEVAR that he had by myself and Dr. King from CT Surgery. Given that he had left SCA coverage, I would like to offer him a left carotid to subclavian bypass as per the SVS guidelines for TEVAR. Will discuss with the CT surgery team. I gave the patient my cell number should he have any questions.

## 2020-01-03 NOTE — PROGRESS NOTE ADULT - ASSESSMENT
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 Treated with argon plasma coagulation (APC)  trendy h/h daily, transfuse prn  Transplant surgery eval noted  Hepatology eval noted; continue diuretics and low salt diet   Vascular surg eval noted; CTA of LLE

## 2020-01-03 NOTE — DIETITIAN INITIAL EVALUATION ADULT. - OTHER INFO
INFORMATION PTA  Diet PTA: Pt reports he was eating very well at rehab.  Nutrition status PTA: Well nourished. Pt manages DM2 with insulin lispro 8 units tid, and insulin glargine 28 units at bedtime. HbA1c 5.8% (12/4) noted.   Nutrition Supplements PTA: none noted  Food Allergies: NKFA  Weight History PTA: Pt noted on December admit with weight loss of ~100 pounds, from fluid overload and diuresis, per Hepatology Transplant Team. Dosing wt 11/26/19: 115.5Kg, Dosing wt 12/26/19: 122.5Kg (with edema). IBW 83.6Kg.  Other Information: Last paracentesis October 2019. Rx for Rifaximin, Lactulose, Lasix, Aldactone, iron sulfate, and folic acid noted.     INFORMATION THIS ADMISSION  Last BM: 12/27, 12/29, 1/1  Other  Information: MELD-Na 26 (1/3/20)  Therapeutic Diet Education Provided: Reviewed Low Sodium dietary restrictions and Consistent Carbohydrate diet. Pt was familiar with education from recent admission. RD will continue to reinforce nutrition guidelines.

## 2020-01-04 LAB
ALBUMIN SERPL ELPH-MCNC: 2.3 G/DL — LOW (ref 3.3–5)
ALP SERPL-CCNC: 114 U/L — SIGNIFICANT CHANGE UP (ref 40–120)
ALT FLD-CCNC: 17 U/L — SIGNIFICANT CHANGE UP (ref 10–45)
ANION GAP SERPL CALC-SCNC: 11 MMOL/L — SIGNIFICANT CHANGE UP (ref 5–17)
AST SERPL-CCNC: 26 U/L — SIGNIFICANT CHANGE UP (ref 10–40)
BILIRUB SERPL-MCNC: 9.2 MG/DL — HIGH (ref 0.2–1.2)
BUN SERPL-MCNC: 15 MG/DL — SIGNIFICANT CHANGE UP (ref 7–23)
CALCIUM SERPL-MCNC: 8.4 MG/DL — SIGNIFICANT CHANGE UP (ref 8.4–10.5)
CHLORIDE SERPL-SCNC: 103 MMOL/L — SIGNIFICANT CHANGE UP (ref 96–108)
CO2 SERPL-SCNC: 22 MMOL/L — SIGNIFICANT CHANGE UP (ref 22–31)
CREAT SERPL-MCNC: 1.07 MG/DL — SIGNIFICANT CHANGE UP (ref 0.5–1.3)
GLUCOSE BLDC GLUCOMTR-MCNC: 112 MG/DL — HIGH (ref 70–99)
GLUCOSE BLDC GLUCOMTR-MCNC: 172 MG/DL — HIGH (ref 70–99)
GLUCOSE BLDC GLUCOMTR-MCNC: 186 MG/DL — HIGH (ref 70–99)
GLUCOSE BLDC GLUCOMTR-MCNC: 91 MG/DL — SIGNIFICANT CHANGE UP (ref 70–99)
GLUCOSE SERPL-MCNC: 96 MG/DL — SIGNIFICANT CHANGE UP (ref 70–99)
HCT VFR BLD CALC: 23.4 % — LOW (ref 39–50)
HGB BLD-MCNC: 7.5 G/DL — LOW (ref 13–17)
INR BLD: 2.02 RATIO — HIGH (ref 0.88–1.16)
MCHC RBC-ENTMCNC: 32.1 GM/DL — SIGNIFICANT CHANGE UP (ref 32–36)
MCHC RBC-ENTMCNC: 33.3 PG — SIGNIFICANT CHANGE UP (ref 27–34)
MCV RBC AUTO: 104 FL — HIGH (ref 80–100)
NRBC # BLD: 0 /100 WBCS — SIGNIFICANT CHANGE UP (ref 0–0)
PLATELET # BLD AUTO: 73 K/UL — LOW (ref 150–400)
POTASSIUM SERPL-MCNC: 4 MMOL/L — SIGNIFICANT CHANGE UP (ref 3.5–5.3)
POTASSIUM SERPL-SCNC: 4 MMOL/L — SIGNIFICANT CHANGE UP (ref 3.5–5.3)
PROT SERPL-MCNC: 5 G/DL — LOW (ref 6–8.3)
PROTHROM AB SERPL-ACNC: 23.5 SEC — HIGH (ref 10–12.9)
RBC # BLD: 2.25 M/UL — LOW (ref 4.2–5.8)
RBC # FLD: 24.7 % — HIGH (ref 10.3–14.5)
SODIUM SERPL-SCNC: 136 MMOL/L — SIGNIFICANT CHANGE UP (ref 135–145)
WBC # BLD: 6.7 K/UL — SIGNIFICANT CHANGE UP (ref 3.8–10.5)
WBC # FLD AUTO: 6.7 K/UL — SIGNIFICANT CHANGE UP (ref 3.8–10.5)

## 2020-01-04 PROCEDURE — 71045 X-RAY EXAM CHEST 1 VIEW: CPT | Mod: 26

## 2020-01-04 PROCEDURE — 99232 SBSQ HOSP IP/OBS MODERATE 35: CPT

## 2020-01-04 RX ORDER — ACETAMINOPHEN 500 MG
650 TABLET ORAL ONCE
Refills: 0 | Status: COMPLETED | OUTPATIENT
Start: 2020-01-04 | End: 2020-01-04

## 2020-01-04 RX ADMIN — INSULIN GLARGINE 24 UNIT(S): 100 INJECTION, SOLUTION SUBCUTANEOUS at 22:19

## 2020-01-04 RX ADMIN — Medication 20 MILLIGRAM(S): at 05:55

## 2020-01-04 RX ADMIN — MIDODRINE HYDROCHLORIDE 20 MILLIGRAM(S): 2.5 TABLET ORAL at 13:00

## 2020-01-04 RX ADMIN — Medication 8 UNIT(S): at 12:59

## 2020-01-04 RX ADMIN — Medication 1: at 18:19

## 2020-01-04 RX ADMIN — SPIRONOLACTONE 50 MILLIGRAM(S): 25 TABLET, FILM COATED ORAL at 05:59

## 2020-01-04 RX ADMIN — TAMSULOSIN HYDROCHLORIDE 0.4 MILLIGRAM(S): 0.4 CAPSULE ORAL at 22:18

## 2020-01-04 RX ADMIN — LACTULOSE 20 GRAM(S): 10 SOLUTION ORAL at 15:20

## 2020-01-04 RX ADMIN — Medication 8 UNIT(S): at 09:13

## 2020-01-04 RX ADMIN — PANTOPRAZOLE SODIUM 40 MILLIGRAM(S): 20 TABLET, DELAYED RELEASE ORAL at 05:55

## 2020-01-04 RX ADMIN — MIDODRINE HYDROCHLORIDE 20 MILLIGRAM(S): 2.5 TABLET ORAL at 05:55

## 2020-01-04 RX ADMIN — Medication 325 MILLIGRAM(S): at 12:59

## 2020-01-04 RX ADMIN — Medication 650 MILLIGRAM(S): at 22:18

## 2020-01-04 RX ADMIN — Medication 1 MILLIGRAM(S): at 12:59

## 2020-01-04 RX ADMIN — Medication 8 UNIT(S): at 18:20

## 2020-01-04 RX ADMIN — LACTULOSE 20 GRAM(S): 10 SOLUTION ORAL at 22:18

## 2020-01-04 RX ADMIN — MIDODRINE HYDROCHLORIDE 20 MILLIGRAM(S): 2.5 TABLET ORAL at 22:18

## 2020-01-04 RX ADMIN — LACTULOSE 20 GRAM(S): 10 SOLUTION ORAL at 05:56

## 2020-01-04 NOTE — PROGRESS NOTE ADULT - ASSESSMENT
63 year old male with HTN, DM2, non-alcoholic cirrhosis, who was recently admitted with altered mental status, hyponatremia and high ammonia level. We last saw him during a hospitalization in 10/2019, during which he was significantly volume overloaded and required iv diuresis.  He is currently being evaluated for liver transplant.  He returns last week with acute blood loss anemia. CT with trace pleural effusions, anasarca, and atherosclerotic calcifications.  EGD yesterday with mild gave s/p APC treatment    Abnormal EKG  - His EKGs have demonstrated prolonged qtc in the past  - repeat ekg shows normal QT  - avoid qtc prolonging medications and replete K to greater than 4 and mg>2     Diastolic HF   - CXR is clear and CT with only trace effusions. Though still with lower ext edema  - watch volume with PRBC transfusions  - he does have some le edema in the setting of his hypoalbuminemia and diastolic dysfunction.  Now on Lasix and spironolactone.    - TTE normal LV systolic function EF 65%; LVH, DD Grade I  - cath with normal filling pressures and no significant cad.  - no asa in setting of acute blood loss anemia.    HTN  - BP on softer side  - hold anti-hypertensives  - midodrine as needed    Anemia  - transfuse to keep Hb >8. trend H/H  - GI follow up    - All other workup as per primary team

## 2020-01-04 NOTE — PROGRESS NOTE ADULT - SUBJECTIVE AND OBJECTIVE BOX
[facility] - Sushila Dhaliwal, Doug, Morenita, Ayo, Colleen Williamson  Office Number:  [phone]    Patient resting comfortably in bed in NAD.  Laying flat with no respiratory distress.  No complaints of chest pain, dyspnea, palpitations, PND, or orthopnea.    F/U for:  CHF        MEDICATIONS  (STANDING):  dextrose 5%. 1000 milliLiter(s) (50 mL/Hr) IV Continuous <Continuous>  dextrose 50% Injectable 12.5 Gram(s) IV Push once  dextrose 50% Injectable 25 Gram(s) IV Push once  dextrose 50% Injectable 25 Gram(s) IV Push once  ferrous    sulfate 325 milliGRAM(s) Oral daily  folic acid 1 milliGRAM(s) Oral daily  furosemide    Tablet 20 milliGRAM(s) Oral daily  insulin glargine Injectable (LANTUS) 24 Unit(s) SubCutaneous at bedtime  insulin lispro (HumaLOG) corrective regimen sliding scale   SubCutaneous three times a day before meals  insulin lispro (HumaLOG) corrective regimen sliding scale   SubCutaneous at bedtime  insulin lispro Injectable (HumaLOG) 8 Unit(s) SubCutaneous three times a day before meals  lactulose Syrup 20 Gram(s) Oral three times a day  midodrine. 20 milliGRAM(s) Oral three times a day  pantoprazole    Tablet 40 milliGRAM(s) Oral two times a day  rifAXIMin 550 milliGRAM(s) Oral two times a day  spironolactone 50 milliGRAM(s) Oral daily  tamsulosin 0.4 milliGRAM(s) Oral at bedtime    MEDICATIONS  (PRN):  dextrose 40% Gel 15 Gram(s) Oral once PRN Blood Glucose LESS THAN 70 milliGRAM(s)/deciliter  glucagon  Injectable 1 milliGRAM(s) IntraMuscular once PRN Glucose LESS THAN 70 milligrams/deciliter      Allergies    codeine (Anaphylaxis)    Intolerances    NO  RED MEAT (Unknown)      Vital Signs Last 24 Hrs  T(C): 37.2 (04 Jan 2020 04:43), Max: 37.3 (03 Jan 2020 21:08)  T(F): 99 (04 Jan 2020 04:43), Max: 99.1 (03 Jan 2020 21:08)  HR: 89 (04 Jan 2020 04:43) (77 - 94)  BP: 103/65 (04 Jan 2020 04:43) (100/56 - 112/67)  BP(mean): --  RR: 18 (04 Jan 2020 04:43) (18 - 19)  SpO2: 96% (04 Jan 2020 04:43) (96% - 99%)    I&O's Summary    03 Jan 2020 07:01  -  04 Jan 2020 07:00  --------------------------------------------------------  IN: 240 mL / OUT: 2370 mL / NET: -2130 mL        ON EXAM:    Constitutional: NAD, awake    HEENT: Moist Mucous Membranes, Anicteric  Pulmonary: Decreased breath sounds b/l. No rales, crackles or wheeze appreciated.   Cardiovascular: Regular, S1 and S2, No murmurs, rubs, gallops or clicks  Gastrointestinal: Bowel Sounds present, soft, nontender.   Lymph: L>R edema in lower ext  Skin: No visible rashes or ulcers.  Psych:  Mood & affect appropriate for situation      LABS: All Labs Reviewed:                        7.5    6.70  )-----------( 73       ( 04 Jan 2020 06:41 )             23.4                         7.5    6.64  )-----------( 73       ( 03 Jan 2020 08:35 )             22.7                         7.3    6.52  )-----------( 71       ( 02 Jan 2020 08:58 )             21.3     04 Jan 2020 06:41    136    |  103    |  15     ----------------------------<  96     4.0     |  22     |  1.07   03 Jan 2020 06:30    136    |  103    |  14     ----------------------------<  91     4.0     |  25     |  1.17   02 Jan 2020 07:05    135    |  103    |  15     ----------------------------<  150    4.0     |  23     |  1.13     Ca    8.4        04 Jan 2020 06:41  Ca    8.3        03 Jan 2020 06:30  Ca    8.4        02 Jan 2020 07:05    TPro  5.0    /  Alb  2.3    /  TBili  9.2    /  DBili  x      /  AST  26     /  ALT  17     /  AlkPhos  114    04 Jan 2020 06:41  TPro  5.1    /  Alb  2.3    /  TBili  8.8    /  DBili  x      /  AST  24     /  ALT  17     /  AlkPhos  126    03 Jan 2020 06:30  TPro  4.9    /  Alb  2.4    /  TBili  8.6    /  DBili  x      /  AST  26     /  ALT  17     /  AlkPhos  150    02 Jan 2020 07:05    PT/INR - ( 04 Jan 2020 06:41 )   PT: 23.5 sec;   INR: 2.02 ratio         PTT - ( 03 Jan 2020 08:30 )  PTT:40.2 sec  CARDIAC MARKERS ( 02 Jan 2020 12:12 )  x     / x     / 38 U/L / x     / x

## 2020-01-04 NOTE — PROGRESS NOTE ADULT - SUBJECTIVE AND OBJECTIVE BOX
INTERVAL HPI/OVERNIGHT EVENTS:  No new overnight event.  No N/V/D.  Tolerating diet.  no bleeding    Allergies    codeine (Anaphylaxis)    Intolerances    NO  RED MEAT (Unknown)    General:  No wt loss, fevers, chills, night sweats, fatigue,   Eyes:  Good vision, no reported pain  ENT:  No sore throat, pain, runny nose, dysphagia  CV:  No pain, palpitations, hypo/hypertension  Resp:  No dyspnea, cough, tachypnea, wheezing  GI:  No pain, No nausea, No vomiting, No diarrhea, No constipation, No weight loss, No fever, No pruritis, No rectal bleeding, No tarry stools, No dysphagia,  :  No pain, bleeding, incontinence, nocturia  Muscle:  No pain, weakness  Neuro:  No weakness, tingling, memory problems  Psych:  No fatigue, insomnia, mood problems, depression  Endocrine:  No polyuria, polydipsia, cold/heat intolerance  Heme:  No petechiae, ecchymosis, easy bruisability  Skin:  No rash, tattoos, scars, edema      PHYSICAL EXAM:   Vital Signs:  Vital Signs Last 24 Hrs  T(C): 37.2 (2020 04:43), Max: 37.3 (2020 21:08)  T(F): 99 (2020 04:43), Max: 99.1 (2020 21:08)  HR: 89 (2020 04:43) (77 - 94)  BP: 103/65 (2020 04:43) (100/56 - 112/67)  BP(mean): --  RR: 18 (2020 04:43) (18 - 19)  SpO2: 96% (2020 04:43) (96% - 99%)  Daily     Daily Weight in k.8 (2020 04:43)I&O's Summary    2020 07:01  -  2020 07:00  --------------------------------------------------------  IN: 240 mL / OUT: 2370 mL / NET: -2130 mL    2020 07:01  -  2020 12:28  --------------------------------------------------------  IN: 0 mL / OUT: 500 mL / NET: -500 mL        GENERAL:  Appears stated age, well-groomed, well-nourished, no distress  HEENT:  NC/AT,  conjunctivae clear and pink, no thyromegaly, nodules, adenopathy, no JVD, sclera -anicteric  CHEST:  Full & symmetric excursion, no increased effort, breath sounds clear  HEART:  Regular rhythm, S1, S2, no murmur/rub/S3/S4, no abdominal bruit, no edema  ABDOMEN:  Soft, non-tender, non-distended, normoactive bowel sounds,  no masses ,no hepato-splenomegaly, no signs of chronic liver disease  EXTEREMITIES:  no cyanosis,clubbing or edema  SKIN:  No rash/erythema/ecchymoses/petechiae/wounds/abscess/warm/dry  NEURO:  Alert, oriented, no asterixis, no tremor, no encephalopathy      LABS:                        7.5    6.70  )-----------( 73       ( 2020 06:41 )             23.4     01-04    136  |  103  |  15  ----------------------------<  96  4.0   |  22  |  1.07    Ca    8.4      2020 06:41    TPro  5.0<L>  /  Alb  2.3<L>  /  TBili  9.2<H>  /  DBili  x   /  AST  26  /  ALT  17  /  AlkPhos  114  01-04    PT/INR - ( 2020 06:41 )   PT: 23.5 sec;   INR: 2.02 ratio         PTT - ( 2020 08:30 )  PTT:40.2 sec    amylase   lipase  RADIOLOGY & ADDITIONAL TESTS:

## 2020-01-04 NOTE — PROGRESS NOTE ADULT - SUBJECTIVE AND OBJECTIVE BOX
SUBJECTIVE / OVERNIGHT EVENTS: pt seen and examined    MEDICATIONS  (STANDING):  dextrose 5%. 1000 milliLiter(s) (50 mL/Hr) IV Continuous <Continuous>  dextrose 50% Injectable 12.5 Gram(s) IV Push once  dextrose 50% Injectable 25 Gram(s) IV Push once  dextrose 50% Injectable 25 Gram(s) IV Push once  ferrous    sulfate 325 milliGRAM(s) Oral daily  folic acid 1 milliGRAM(s) Oral daily  furosemide    Tablet 20 milliGRAM(s) Oral daily  insulin glargine Injectable (LANTUS) 24 Unit(s) SubCutaneous at bedtime  insulin lispro (HumaLOG) corrective regimen sliding scale   SubCutaneous three times a day before meals  insulin lispro (HumaLOG) corrective regimen sliding scale   SubCutaneous at bedtime  insulin lispro Injectable (HumaLOG) 8 Unit(s) SubCutaneous three times a day before meals  lactulose Syrup 20 Gram(s) Oral three times a day  midodrine. 20 milliGRAM(s) Oral three times a day  pantoprazole    Tablet 40 milliGRAM(s) Oral two times a day  rifAXIMin 550 milliGRAM(s) Oral two times a day  spironolactone 50 milliGRAM(s) Oral daily  tamsulosin 0.4 milliGRAM(s) Oral at bedtime    MEDICATIONS  (PRN):  dextrose 40% Gel 15 Gram(s) Oral once PRN Blood Glucose LESS THAN 70 milliGRAM(s)/deciliter  glucagon  Injectable 1 milliGRAM(s) IntraMuscular once PRN Glucose LESS THAN 70 milligrams/deciliter    Vital Signs Last 24 Hrs  T(C): 37.2 (04 Jan 2020 12:41), Max: 37.3 (03 Jan 2020 21:08)  T(F): 98.9 (04 Jan 2020 12:41), Max: 99.1 (03 Jan 2020 21:08)  HR: 96 (04 Jan 2020 12:41) (89 - 96)  BP: 126/62 (04 Jan 2020 12:41) (103/65 - 126/62)  BP(mean): --  RR: 18 (04 Jan 2020 12:41) (18 - 19)  SpO2: 93% (04 Jan 2020 12:41) (93% - 98%)    Constitutional: No fever, fatigue  Skin: No rash.  Eyes: No recent vision problems or eye pain.  ENT: No congestion, ear pain, or sore throat.  Cardiovascular: No chest pain or palpation.  Respiratory: No cough, shortness of breath, congestion, or wheezing.  Gastrointestinal: No abdominal pain, nausea, vomiting, or diarrhea.  Genitourinary: No dysuria.  Musculoskeletal: No joint swelling.  Neurologic: No headache.    PHYSICAL EXAM:  GENERAL: NAD  EYES: EOMI, PERRLA  NECK: Supple, No JVD  CHEST/LUNG: dec breath sounds rt base  HEART:  S1 , S2 +  ABDOMEN: soft , bs+, mild distension+  EXTREMITIES: left > rt edema+  NEUROLOGY: alert awake oriented x place, person    LABS:  01-04    136  |  103  |  15  ----------------------------<  96  4.0   |  22  |  1.07    Ca    8.4      04 Jan 2020 06:41    TPro  5.0<L>  /  Alb  2.3<L>  /  TBili  9.2<H>  /  DBili      /  AST  26  /  ALT  17  /  AlkPhos  114  01-04    Creatinine Trend: 1.07 <--, 1.17 <--, 1.13 <--, 1.11 <--, 1.04 <--, 1.12 <--, 1.26 <--                        7.5    6.70  )-----------( 73       ( 04 Jan 2020 06:41 )             23.4     Urine Studies:            LIVER FUNCTIONS - ( 04 Jan 2020 06:41 )  Alb: 2.3 g/dL / Pro: 5.0 g/dL / ALK PHOS: 114 U/L / ALT: 17 U/L / AST: 26 U/L / GGT: x           PT/INR - ( 04 Jan 2020 06:41 )   PT: 23.5 sec;   INR: 2.02 ratio         PTT - ( 03 Jan 2020 08:30 )  PTT:40.2 sec  Care Discussed with Consultants/Other Providers:

## 2020-01-04 NOTE — CHART NOTE - NSCHARTNOTEFT_GEN_A_CORE
CC: Fever - 100.8F    HPI:  Notified by RN patient with temperature of 100.8F. Patient seen and examined by me at bedside.   Patient is alert, NAD.  Patient denied headache, dizziness, chest pain, shortness of breath, cough, rhinorrhea, N/V/D, urinary symptoms, or abdominal pain.        VITAL SIGNS:  T(C): 38.2 (01-04-20 @ 20:59), Max: 38.2 (01-04-20 @ 20:59)  HR: 94 (01-04-20 @ 20:59) (89 - 96)  BP: 138/76 (01-04-20 @ 20:59) (103/65 - 138/76)  RR: 18 (01-04-20 @ 20:59) (18 - 18)  SpO2: 95% (01-04-20 @ 20:59) (93% - 96%)  Wt(kg): --      Physical Exam:  General: WN/WD NAD, AOx3, nontoxic appearing  Head:  NC/AT  CV: RRR, S1S2   Respiratory: CTA B/L, nonlabored. No rales/rhonchi/wheezes.  Abdominal: (+) bowel sounds x4. Soft, NT, ND, no palpable mass, no guarding, or rebound tenderness  MSK: (+) BLLE edema, + peripheral pulses, FROM all 4 extremity  Skin: (+) warm, dry   Psych: Appropriate affect       LABORATORY:                        7.5    6.70  )-----------( 73       ( 04 Jan 2020 06:41 )             23.4       01-04    136  |  103  |  15  ----------------------------<  96  4.0   |  22  |  1.07    Ca    8.4      04 Jan 2020 06:41    TPro  5.0<L>  /  Alb  2.3<L>  /  TBili  9.2<H>  /  DBili  x   /  AST  26  /  ALT  17  /  AlkPhos  114  01-04                ASSESSMENT/PLAN:   HPI:  Mr. Segundo is a 65 y/o male with a PMH of GERD w/ esophagitis, T2DM on insulin, HLD, non-alcoholic cirrhosis w/ hepatic encephalopathy and SBP, HFpEF, GIB, and chronic thrombocytopenia presented to the ED from [facility] rehab for low hemoglobin.          Patient now with temperature of 100.8F    1) Fever  -Tylenol and cooling measures prn for pyrexia  -BC x2 sent  -UA sent  -CXR showed clear lungs by my read  -Will continue to monitor of antibiotics for now, pending cultures  -Will consider broad spectrum abx coverage if positive UA, fever persists, or patient develops symptoms  -Can consider ID consult if warranted  -Will continue to closely monitor patient/vitals   -Will endorse to primary team in AM    Lanre Puckett PA-C  Dept of Medicine

## 2020-01-04 NOTE — CHART NOTE - NSCHARTNOTEFT_GEN_A_CORE
64M w/ PMH of GERD, esophagitis, T2DM on insulin, non-alcoholic cirrhosis, HFpEF, GIB, chronic thrombocytopenia, presented to the hospital from [facility] for low H/h, followed by Transplant surgery team for possible liver transplant, now found to have L thigh hematoma after fall 6 wks ago. CTA LLE showing hematoma without active extravasation.   Patient seen and examined at bedside. Reports he believes swelling of LLE has improved.   Left thigh soft, nontender to palpation.    F/u H&H stable, 7.5/23.4  No vascular surgery intervention at this time.  Vascular Surgery signing off. Please page back with any questions.   [id number]

## 2020-01-05 DIAGNOSIS — R50.9 FEVER, UNSPECIFIED: ICD-10-CM

## 2020-01-05 LAB
ALBUMIN SERPL ELPH-MCNC: 2.5 G/DL — LOW (ref 3.3–5)
ALP SERPL-CCNC: 115 U/L — SIGNIFICANT CHANGE UP (ref 40–120)
ALT FLD-CCNC: 14 U/L — SIGNIFICANT CHANGE UP (ref 10–45)
ANION GAP SERPL CALC-SCNC: 9 MMOL/L — SIGNIFICANT CHANGE UP (ref 5–17)
APPEARANCE UR: ABNORMAL
AST SERPL-CCNC: 26 U/L — SIGNIFICANT CHANGE UP (ref 10–40)
BACTERIA # UR AUTO: ABNORMAL
BILIRUB SERPL-MCNC: 9.9 MG/DL — HIGH (ref 0.2–1.2)
BILIRUB UR-MCNC: ABNORMAL
BUN SERPL-MCNC: 14 MG/DL — SIGNIFICANT CHANGE UP (ref 7–23)
CALCIUM SERPL-MCNC: 8.4 MG/DL — SIGNIFICANT CHANGE UP (ref 8.4–10.5)
CHLORIDE SERPL-SCNC: 99 MMOL/L — SIGNIFICANT CHANGE UP (ref 96–108)
CO2 SERPL-SCNC: 24 MMOL/L — SIGNIFICANT CHANGE UP (ref 22–31)
COLOR SPEC: ABNORMAL
CREAT SERPL-MCNC: 1.06 MG/DL — SIGNIFICANT CHANGE UP (ref 0.5–1.3)
DIFF PNL FLD: ABNORMAL
EPI CELLS # UR: 0 /HPF — SIGNIFICANT CHANGE UP
GLUCOSE BLDC GLUCOMTR-MCNC: 116 MG/DL — HIGH (ref 70–99)
GLUCOSE BLDC GLUCOMTR-MCNC: 119 MG/DL — HIGH (ref 70–99)
GLUCOSE BLDC GLUCOMTR-MCNC: 140 MG/DL — HIGH (ref 70–99)
GLUCOSE BLDC GLUCOMTR-MCNC: 150 MG/DL — HIGH (ref 70–99)
GLUCOSE SERPL-MCNC: 113 MG/DL — HIGH (ref 70–99)
GLUCOSE UR QL: NEGATIVE — SIGNIFICANT CHANGE UP
HCT VFR BLD CALC: 24.1 % — LOW (ref 39–50)
HGB BLD-MCNC: 7.8 G/DL — LOW (ref 13–17)
HYALINE CASTS # UR AUTO: 0 /LPF — SIGNIFICANT CHANGE UP (ref 0–2)
INR BLD: 2.07 RATIO — HIGH (ref 0.88–1.16)
KETONES UR-MCNC: SIGNIFICANT CHANGE UP
LEUKOCYTE ESTERASE UR-ACNC: ABNORMAL
MCHC RBC-ENTMCNC: 32.4 GM/DL — SIGNIFICANT CHANGE UP (ref 32–36)
MCHC RBC-ENTMCNC: 33.6 PG — SIGNIFICANT CHANGE UP (ref 27–34)
MCV RBC AUTO: 103.9 FL — HIGH (ref 80–100)
NITRITE UR-MCNC: NEGATIVE — SIGNIFICANT CHANGE UP
NRBC # BLD: 0 /100 WBCS — SIGNIFICANT CHANGE UP (ref 0–0)
PH UR: 6 — SIGNIFICANT CHANGE UP (ref 5–8)
PLATELET # BLD AUTO: 79 K/UL — LOW (ref 150–400)
POTASSIUM SERPL-MCNC: 3.6 MMOL/L — SIGNIFICANT CHANGE UP (ref 3.5–5.3)
POTASSIUM SERPL-SCNC: 3.6 MMOL/L — SIGNIFICANT CHANGE UP (ref 3.5–5.3)
PROT SERPL-MCNC: 5.2 G/DL — LOW (ref 6–8.3)
PROT UR-MCNC: ABNORMAL
PROTHROM AB SERPL-ACNC: 24.4 SEC — HIGH (ref 10–12.9)
RBC # BLD: 2.32 M/UL — LOW (ref 4.2–5.8)
RBC # FLD: 24.5 % — HIGH (ref 10.3–14.5)
RBC CASTS # UR COMP ASSIST: 8 /HPF — HIGH (ref 0–4)
SODIUM SERPL-SCNC: 132 MMOL/L — LOW (ref 135–145)
SP GR SPEC: 1.02 — SIGNIFICANT CHANGE UP (ref 1.01–1.02)
UROBILINOGEN FLD QL: ABNORMAL
WBC # BLD: 12.25 K/UL — HIGH (ref 3.8–10.5)
WBC # FLD AUTO: 12.25 K/UL — HIGH (ref 3.8–10.5)
WBC UR QL: 223 /HPF — HIGH (ref 0–5)

## 2020-01-05 PROCEDURE — 99232 SBSQ HOSP IP/OBS MODERATE 35: CPT

## 2020-01-05 RX ADMIN — Medication 8 UNIT(S): at 17:42

## 2020-01-05 RX ADMIN — Medication 325 MILLIGRAM(S): at 13:12

## 2020-01-05 RX ADMIN — LACTULOSE 20 GRAM(S): 10 SOLUTION ORAL at 22:17

## 2020-01-05 RX ADMIN — Medication 1 MILLIGRAM(S): at 13:12

## 2020-01-05 RX ADMIN — MIDODRINE HYDROCHLORIDE 20 MILLIGRAM(S): 2.5 TABLET ORAL at 17:42

## 2020-01-05 RX ADMIN — LACTULOSE 20 GRAM(S): 10 SOLUTION ORAL at 06:18

## 2020-01-05 RX ADMIN — INSULIN GLARGINE 24 UNIT(S): 100 INJECTION, SOLUTION SUBCUTANEOUS at 22:18

## 2020-01-05 RX ADMIN — Medication 8 UNIT(S): at 09:03

## 2020-01-05 RX ADMIN — Medication 20 MILLIGRAM(S): at 06:17

## 2020-01-05 RX ADMIN — PANTOPRAZOLE SODIUM 40 MILLIGRAM(S): 20 TABLET, DELAYED RELEASE ORAL at 17:43

## 2020-01-05 RX ADMIN — SPIRONOLACTONE 50 MILLIGRAM(S): 25 TABLET, FILM COATED ORAL at 06:17

## 2020-01-05 RX ADMIN — Medication 650 MILLIGRAM(S): at 00:09

## 2020-01-05 RX ADMIN — MIDODRINE HYDROCHLORIDE 20 MILLIGRAM(S): 2.5 TABLET ORAL at 06:17

## 2020-01-05 RX ADMIN — MIDODRINE HYDROCHLORIDE 20 MILLIGRAM(S): 2.5 TABLET ORAL at 13:12

## 2020-01-05 RX ADMIN — LACTULOSE 20 GRAM(S): 10 SOLUTION ORAL at 13:13

## 2020-01-05 RX ADMIN — Medication 8 UNIT(S): at 13:12

## 2020-01-05 RX ADMIN — PANTOPRAZOLE SODIUM 40 MILLIGRAM(S): 20 TABLET, DELAYED RELEASE ORAL at 06:17

## 2020-01-05 RX ADMIN — TAMSULOSIN HYDROCHLORIDE 0.4 MILLIGRAM(S): 0.4 CAPSULE ORAL at 22:18

## 2020-01-05 NOTE — PROGRESS NOTE ADULT - SUBJECTIVE AND OBJECTIVE BOX
INTERVAL HPI/OVERNIGHT EVENTS:  No new overnight event.  No N/V/D.  Tolerating diet.  no bleeding and is less confused    Allergies    codeine (Anaphylaxis)    Intolerances    NO  RED MEAT (Unknown)    General:  No wt loss, fevers, chills, night sweats, fatigue,   Eyes:  Good vision, no reported pain  ENT:  No sore throat, pain, runny nose, dysphagia  CV:  No pain, palpitations, hypo/hypertension  Resp:  No dyspnea, cough, tachypnea, wheezing  GI:  No pain, No nausea, No vomiting, No diarrhea, No constipation, No weight loss, No fever, No pruritis, No rectal bleeding, No tarry stools, No dysphagia,  :  No pain, bleeding, incontinence, nocturia  Muscle:  No pain, weakness  Neuro:  No weakness, tingling, memory problems  Psych:  No fatigue, insomnia, mood problems, depression  Endocrine:  No polyuria, polydipsia, cold/heat intolerance  Heme:  No petechiae, ecchymosis, easy bruisability  Skin:  No rash, tattoos, scars, edema      PHYSICAL EXAM:   Vital Signs:  Vital Signs Last 24 Hrs  T(C): 37 (2020 14:40), Max: 38.2 (2020 20:59)  T(F): 98.6 (2020 14:40), Max: 100.8 (2020 20:59)  HR: 87 (2020 14:40) (84 - 94)  BP: 111/61 (2020 14:40) (111/61 - 138/76)  BP(mean): --  RR: 18 (2020 14:40) (17 - 18)  SpO2: 96% (2020 14:40) (95% - 97%)  Daily     Daily Weight in k.4 (2020 04:46)I&O's Summary    2020 07:01  -  2020 07:00  --------------------------------------------------------  IN: 840 mL / OUT: 2600 mL / NET: -1760 mL    2020 07:01  -  2020 15:26  --------------------------------------------------------  IN: 480 mL / OUT: 0 mL / NET: 480 mL        GENERAL:  Appears stated age, well-groomed, well-nourished, no distress  HEENT:  NC/AT,  conjunctivae clear and pink, no thyromegaly, nodules, adenopathy, no JVD, sclera -anicteric  CHEST:  Full & symmetric excursion, no increased effort, breath sounds clear  HEART:  Regular rhythm, S1, S2, no murmur/rub/S3/S4, no abdominal bruit, no edema  ABDOMEN:  Soft, non-tender, non-distended, normoactive bowel sounds,  no masses ,no hepato-splenomegaly, no signs of chronic liver disease  EXTEREMITIES:  no cyanosis,clubbing or edema  SKIN:  No rash/erythema/ecchymoses/petechiae/wounds/abscess/warm/dry  NEURO:  Alert, oriented, no asterixis, no tremor, no encephalopathy      LABS:                        7.8    12.25 )-----------( 79       ( 2020 07:26 )             24.1     01-05    132<L>  |  99  |  14  ----------------------------<  113<H>  3.6   |  24  |  1.06    Ca    8.4      2020 07:26    TPro  5.2<L>  /  Alb  2.5<L>  /  TBili  9.9<H>  /  DBili  x   /  AST  26  /  ALT  14  /  AlkPhos  115  01-05    PT/INR - ( 2020 07:26 )   PT: 24.4 sec;   INR: 2.07 ratio           Urinalysis Basic - ( 2020 07:26 )    Color: Dark Yellow / Appearance: Slightly Turbid / S.025 / pH: x  Gluc: x / Ketone: Trace  / Bili: Small / Urobili: 6 mg/dL   Blood: x / Protein: 30 mg/dL / Nitrite: Negative   Leuk Esterase: Large / RBC: 8 /hpf /  /HPF   Sq Epi: x / Non Sq Epi: 0 /hpf / Bacteria: Many      amylase   lipase  RADIOLOGY & ADDITIONAL TESTS:

## 2020-01-05 NOTE — PROGRESS NOTE ADULT - ASSESSMENT
63 year old male with HTN, DM2, non-alcoholic cirrhosis, who was recently admitted with altered mental status, hyponatremia and high ammonia level. We last saw him during a hospitalization in 10/2019, during which he was significantly volume overloaded and required iv diuresis.  He is currently being evaluated for liver transplant.  He returns last week with acute blood loss anemia. CT with trace pleural effusions, anasarca, and atherosclerotic calcifications.  EGD yesterday with mild gave s/p APC treatment    Abnormal EKG  - His EKGs have demonstrated prolonged qtc in the past  - repeat ekg shows normal QT  - avoid qtc prolonging medications and replete K to greater than 4 and mg>2     Diastolic HF   - CXR is clear and CT with only trace effusions. Though still with lower ext edema  - he does have some le edema in the setting of his hypoalbuminemia and diastolic dysfunction.  Now on Lasix and spironolactone.    - TTE normal LV systolic function EF 65%; LVH, DD Grade I  - cath with normal filling pressures and no significant cad.  - no asa in setting of acute blood loss anemia.    HTN  - BP on softer side  - hold anti-hypertensives  - midodrine as needed    Anemia  - transfuse to keep Hb >8. trend H/H  - GI follow up    - All other workup as per primary team

## 2020-01-05 NOTE — PROVIDER CONTACT NOTE (OTHER) - BACKGROUND
Patient is a 64 year old male admitted with GIB, also here for liver transplant evaluation. PMH includes fatty liver disease, HTN, Diabetes.

## 2020-01-05 NOTE — PROGRESS NOTE ADULT - SUBJECTIVE AND OBJECTIVE BOX
SUBJECTIVE / OVERNIGHT EVENTS: pt seen and examined  spike of temp last night as per nursing staff     MEDICATIONS  (STANDING):  dextrose 5%. 1000 milliLiter(s) (50 mL/Hr) IV Continuous <Continuous>  dextrose 50% Injectable 12.5 Gram(s) IV Push once  dextrose 50% Injectable 25 Gram(s) IV Push once  dextrose 50% Injectable 25 Gram(s) IV Push once  ferrous    sulfate 325 milliGRAM(s) Oral daily  folic acid 1 milliGRAM(s) Oral daily  furosemide    Tablet 20 milliGRAM(s) Oral daily  insulin glargine Injectable (LANTUS) 24 Unit(s) SubCutaneous at bedtime  insulin lispro (HumaLOG) corrective regimen sliding scale   SubCutaneous three times a day before meals  insulin lispro (HumaLOG) corrective regimen sliding scale   SubCutaneous at bedtime  insulin lispro Injectable (HumaLOG) 8 Unit(s) SubCutaneous three times a day before meals  lactulose Syrup 20 Gram(s) Oral three times a day  midodrine. 20 milliGRAM(s) Oral three times a day  pantoprazole    Tablet 40 milliGRAM(s) Oral two times a day  rifAXIMin 550 milliGRAM(s) Oral two times a day  spironolactone 50 milliGRAM(s) Oral daily  tamsulosin 0.4 milliGRAM(s) Oral at bedtime    MEDICATIONS  (PRN):  dextrose 40% Gel 15 Gram(s) Oral once PRN Blood Glucose LESS THAN 70 milliGRAM(s)/deciliter  glucagon  Injectable 1 milliGRAM(s) IntraMuscular once PRN Glucose LESS THAN 70 milligrams/deciliter    Vital Signs Last 24 Hrs  T(C): 37 (2020 14:40), Max: 38.2 (2020 20:59)  T(F): 98.6 (2020 14:40), Max: 100.8 (2020 20:59)  HR: 87 (2020 14:40) (84 - 94)  BP: 111/61 (2020 14:40) (111/61 - 138/76)  BP(mean): --  RR: 18 (2020 14:40) (17 - 18)  SpO2: 96% (2020 14:40) (95% - 97%)    Constitutional: No fever, fatigue  Skin: No rash.  Eyes: No recent vision problems or eye pain.  ENT: No congestion, ear pain, or sore throat.  Cardiovascular: No chest pain or palpation.  Respiratory: No cough, shortness of breath, congestion, or wheezing.  Gastrointestinal: No abdominal pain, nausea, vomiting, or diarrhea.  Genitourinary: No dysuria.  Musculoskeletal: No joint swelling.  Neurologic: No headache.    PHYSICAL EXAM:  GENERAL: NAD  EYES: EOMI, PERRLA  NECK: Supple, No JVD  CHEST/LUNG: dec breath sounds rt base  HEART:  S1 , S2 +  ABDOMEN: soft , bs+, mild distension+  EXTREMITIES: left > rt edema+  NEUROLOGY: alert awake oriented x place, person    LABS:      132<L>  |  99  |  14  ----------------------------<  113<H>  3.6   |  24  |  1.06    Ca    8.4      2020 07:26    TPro  5.2<L>  /  Alb  2.5<L>  /  TBili  9.9<H>  /  DBili      /  AST  26  /  ALT  14  /  AlkPhos  115  -    Creatinine Trend: 1.06 <--, 1.07 <--, 1.17 <--, 1.13 <--, 1.11 <--, 1.04 <--, 1.12 <--                        7.8    12.25 )-----------( 79       ( 2020 07:26 )             24.1     Urine Studies:  Urinalysis Basic - ( 2020 07:26 )    Color: Dark Yellow / Appearance: Slightly Turbid / S.025 / pH:   Gluc:  / Ketone: Trace  / Bili: Small / Urobili: 6 mg/dL   Blood:  / Protein: 30 mg/dL / Nitrite: Negative   Leuk Esterase: Large / RBC: 8 /hpf /  /HPF   Sq Epi:  / Non Sq Epi: 0 /hpf / Bacteria: Many              LIVER FUNCTIONS - ( 2020 07:26 )  Alb: 2.5 g/dL / Pro: 5.2 g/dL / ALK PHOS: 115 U/L / ALT: 14 U/L / AST: 26 U/L / GGT: x           PT/INR - ( 2020 07:26 )   PT: 24.4 sec;   INR: 2.07 ratio                   LIVER FUNCTIONS - ( 2020 06:41 )  Alb: 2.3 g/dL / Pro: 5.0 g/dL / ALK PHOS: 114 U/L / ALT: 17 U/L / AST: 26 U/L / GGT: x           PT/INR - ( 2020 06:41 )   PT: 23.5 sec;   INR: 2.02 ratio         PTT - ( 2020 08:30 )  PTT:40.2 sec  Care Discussed with Consultants/Other Providers:

## 2020-01-06 LAB
ALBUMIN SERPL ELPH-MCNC: 2.5 G/DL — LOW (ref 3.3–5)
ALP SERPL-CCNC: 111 U/L — SIGNIFICANT CHANGE UP (ref 40–120)
ALT FLD-CCNC: 13 U/L — SIGNIFICANT CHANGE UP (ref 10–45)
ANION GAP SERPL CALC-SCNC: 10 MMOL/L — SIGNIFICANT CHANGE UP (ref 5–17)
AST SERPL-CCNC: 23 U/L — SIGNIFICANT CHANGE UP (ref 10–40)
BILIRUB SERPL-MCNC: 11.4 MG/DL — HIGH (ref 0.2–1.2)
BUN SERPL-MCNC: 16 MG/DL — SIGNIFICANT CHANGE UP (ref 7–23)
CALCIUM SERPL-MCNC: 8.5 MG/DL — SIGNIFICANT CHANGE UP (ref 8.4–10.5)
CHLORIDE SERPL-SCNC: 97 MMOL/L — SIGNIFICANT CHANGE UP (ref 96–108)
CO2 SERPL-SCNC: 22 MMOL/L — SIGNIFICANT CHANGE UP (ref 22–31)
CREAT SERPL-MCNC: 1.12 MG/DL — SIGNIFICANT CHANGE UP (ref 0.5–1.3)
GLUCOSE BLDC GLUCOMTR-MCNC: 121 MG/DL — HIGH (ref 70–99)
GLUCOSE BLDC GLUCOMTR-MCNC: 151 MG/DL — HIGH (ref 70–99)
GLUCOSE BLDC GLUCOMTR-MCNC: 168 MG/DL — HIGH (ref 70–99)
GLUCOSE BLDC GLUCOMTR-MCNC: 194 MG/DL — HIGH (ref 70–99)
GLUCOSE SERPL-MCNC: 128 MG/DL — HIGH (ref 70–99)
HCT VFR BLD CALC: 25.7 % — LOW (ref 39–50)
HGB BLD-MCNC: 8.7 G/DL — LOW (ref 13–17)
INR BLD: 2.16 RATIO — HIGH (ref 0.88–1.16)
MCHC RBC-ENTMCNC: 33.9 GM/DL — SIGNIFICANT CHANGE UP (ref 32–36)
MCHC RBC-ENTMCNC: 34.7 PG — HIGH (ref 27–34)
MCV RBC AUTO: 102.4 FL — HIGH (ref 80–100)
PLATELET # BLD AUTO: 78 K/UL — LOW (ref 150–400)
POTASSIUM SERPL-MCNC: 3.7 MMOL/L — SIGNIFICANT CHANGE UP (ref 3.5–5.3)
POTASSIUM SERPL-SCNC: 3.7 MMOL/L — SIGNIFICANT CHANGE UP (ref 3.5–5.3)
PROT SERPL-MCNC: 5.5 G/DL — LOW (ref 6–8.3)
PROTHROM AB SERPL-ACNC: 25.2 SEC — HIGH (ref 10–13.1)
RBC # BLD: 2.51 M/UL — LOW (ref 4.2–5.8)
RBC # FLD: 24.4 % — HIGH (ref 10.3–14.5)
SODIUM SERPL-SCNC: 129 MMOL/L — LOW (ref 135–145)
WBC # BLD: 17.16 K/UL — HIGH (ref 3.8–10.5)
WBC # FLD AUTO: 17.16 K/UL — HIGH (ref 3.8–10.5)

## 2020-01-06 PROCEDURE — 99232 SBSQ HOSP IP/OBS MODERATE 35: CPT

## 2020-01-06 PROCEDURE — 99223 1ST HOSP IP/OBS HIGH 75: CPT

## 2020-01-06 PROCEDURE — 99232 SBSQ HOSP IP/OBS MODERATE 35: CPT | Mod: GC

## 2020-01-06 RX ORDER — VANCOMYCIN HCL 1 G
VIAL (EA) INTRAVENOUS
Refills: 0 | Status: DISCONTINUED | OUTPATIENT
Start: 2020-01-06 | End: 2020-01-07

## 2020-01-06 RX ORDER — VANCOMYCIN HCL 1 G
1250 VIAL (EA) INTRAVENOUS ONCE
Refills: 0 | Status: COMPLETED | OUTPATIENT
Start: 2020-01-06 | End: 2020-01-06

## 2020-01-06 RX ORDER — VANCOMYCIN HCL 1 G
1250 VIAL (EA) INTRAVENOUS EVERY 12 HOURS
Refills: 0 | Status: DISCONTINUED | OUTPATIENT
Start: 2020-01-07 | End: 2020-01-07

## 2020-01-06 RX ORDER — MEROPENEM 1 G/30ML
INJECTION INTRAVENOUS
Refills: 0 | Status: DISCONTINUED | OUTPATIENT
Start: 2020-01-06 | End: 2020-01-08

## 2020-01-06 RX ORDER — MEROPENEM 1 G/30ML
1000 INJECTION INTRAVENOUS ONCE
Refills: 0 | Status: COMPLETED | OUTPATIENT
Start: 2020-01-06 | End: 2020-01-06

## 2020-01-06 RX ORDER — MEROPENEM 1 G/30ML
1000 INJECTION INTRAVENOUS EVERY 8 HOURS
Refills: 0 | Status: DISCONTINUED | OUTPATIENT
Start: 2020-01-06 | End: 2020-01-08

## 2020-01-06 RX ADMIN — SPIRONOLACTONE 50 MILLIGRAM(S): 25 TABLET, FILM COATED ORAL at 05:14

## 2020-01-06 RX ADMIN — PANTOPRAZOLE SODIUM 40 MILLIGRAM(S): 20 TABLET, DELAYED RELEASE ORAL at 17:29

## 2020-01-06 RX ADMIN — Medication 20 MILLIGRAM(S): at 05:14

## 2020-01-06 RX ADMIN — LACTULOSE 20 GRAM(S): 10 SOLUTION ORAL at 21:57

## 2020-01-06 RX ADMIN — MIDODRINE HYDROCHLORIDE 20 MILLIGRAM(S): 2.5 TABLET ORAL at 17:29

## 2020-01-06 RX ADMIN — Medication 325 MILLIGRAM(S): at 11:21

## 2020-01-06 RX ADMIN — MEROPENEM 100 MILLIGRAM(S): 1 INJECTION INTRAVENOUS at 21:58

## 2020-01-06 RX ADMIN — Medication 8 UNIT(S): at 08:58

## 2020-01-06 RX ADMIN — MEROPENEM 100 MILLIGRAM(S): 1 INJECTION INTRAVENOUS at 17:28

## 2020-01-06 RX ADMIN — TAMSULOSIN HYDROCHLORIDE 0.4 MILLIGRAM(S): 0.4 CAPSULE ORAL at 21:57

## 2020-01-06 RX ADMIN — Medication 1 MILLIGRAM(S): at 11:21

## 2020-01-06 RX ADMIN — MIDODRINE HYDROCHLORIDE 20 MILLIGRAM(S): 2.5 TABLET ORAL at 11:21

## 2020-01-06 RX ADMIN — MIDODRINE HYDROCHLORIDE 20 MILLIGRAM(S): 2.5 TABLET ORAL at 05:14

## 2020-01-06 RX ADMIN — Medication 166.67 MILLIGRAM(S): at 18:12

## 2020-01-06 RX ADMIN — Medication 1: at 17:48

## 2020-01-06 RX ADMIN — LACTULOSE 20 GRAM(S): 10 SOLUTION ORAL at 05:14

## 2020-01-06 RX ADMIN — Medication 8 UNIT(S): at 12:55

## 2020-01-06 RX ADMIN — INSULIN GLARGINE 24 UNIT(S): 100 INJECTION, SOLUTION SUBCUTANEOUS at 21:58

## 2020-01-06 RX ADMIN — PANTOPRAZOLE SODIUM 40 MILLIGRAM(S): 20 TABLET, DELAYED RELEASE ORAL at 05:14

## 2020-01-06 RX ADMIN — Medication 8 UNIT(S): at 17:48

## 2020-01-06 RX ADMIN — Medication 1: at 12:55

## 2020-01-06 RX ADMIN — LACTULOSE 20 GRAM(S): 10 SOLUTION ORAL at 14:27

## 2020-01-06 NOTE — CONSULT NOTE ADULT - SUBJECTIVE AND OBJECTIVE BOX
Patient is a 64y old  Male who presents with a chief complaint of low Hgb (2020 10:05)    HPI:  Mr. Segundo is a 65 y/o male with a PMH of GERD w/ esophagitis, T2DM on insulin, HLD, non-alcoholic cirrhosis w/ hepatic encephalopathy and SBP, HFpEF, GIB, and chronic thrombocytopenia presented to the ED from [facility] rehab for low hemoglobin.    Since falling incidence, patient has been unable to ambulate and has been referred to rehab, where he is now. He was found to be anemic to 6.7 on , and 1u pRBC was transfused with appropriate response (7.8). Patient visited hepatology office today for further evaluation as liver transplant candidate, where his Hgb was found to be 6.1. Patient remained hemodynamically stable with no active symptoms other than lower left calf pain with movements, yet given recurrent hemoglobin drop, patient was referred to [facility].  Patient remains asymptomatic and denies any headache, CP, palpitation, SOB, lightheadedness, visual disturbance, fever, chills, cough. Denies any melena/hematochezia/hematuria.  He still reports having anteriolateral thigh pain, that is aggravated with movements. Recent CT and US were unrevealing.    In ED, temp 98.2, HR 80, /63, RR 18, SpO2 98% on RA.  IV protonix 80mg x1. (27 Dec 2019 02:58)    HPI and Hospital course reviewed. 65 y/o M PMHx of IDDMII, HfpEF, decompensated JEAN cirrhosis, hepatic encephalopathy, previous episode of SBP, previous GBS bacteremia and ESBL UTI, was sent from rehab for acute on chronic anemia, HE, and LLE hematoma. Pt had EGD on  which showed small varices not amenable to banding, GAVE s/p APC. He received 5 days of CTX earlier in hospitalization. Rest of work up remarkable for CTA LLE showing hematoma, left knee effusion, CT A/P, abdominal sonogram with trace ascites. On  pt had isolated low grade fever to 100.8, the following day developed leukocytosis now increasing to 17.16. No steroid use noted, Bcx  NTD with unremarkable CXR .    Pt currently denies fever, chills, cough, SOB, abdominal pain, diarrhea, dysuira, pain at LLE.     prior hospital charts reviewed [ x ]  primary team notes reviewed [x  ]  other consultant notes reviewed [ x ]  PAST MEDICAL & SURGICAL HISTORY:  GIB (gastrointestinal bleeding)  GERD with esophagitis: Gastritis &amp; Non Bleeding Ulcers  Hepatic encephalopathy  Obesity  Fatty liver disease, nonalcoholic  Renal stones: 25 years ago  Hypertension  Neuropathy  Hypercholesteremia  Diabetes  S/P cholecystectomy    Allergies  codeine (Anaphylaxis)    ANTIMICROBIALS (past 90 days)  MEDICATIONS  (STANDING):  cefTRIAXone   IVPB   100 mL/Hr IV Intermittent (19 @ 05:47)   100 mL/Hr IV Intermittent (19 @ 05:43)   100 mL/Hr IV Intermittent (19 @ 05:41)   100 mL/Hr IV Intermittent (19 @ 05:28)   100 mL/Hr IV Intermittent (19 @ 05:42)    rifAXIMin   550 milliGRAM(s) Oral (20 @ 05:14)   550 milliGRAM(s) Oral (20 @ 17:42)   550 milliGRAM(s) Oral (20 @ 06:17)   550 milliGRAM(s) Oral (20 @ 05:55)   550 milliGRAM(s) Oral (20 @ 17:53)   550 milliGRAM(s) Oral (20 @ 06:01)   550 milliGRAM(s) Oral (20 @ 17:54)   550 milliGRAM(s) Oral (20 @ 05:44)   550 milliGRAM(s) Oral (20 @ 17:16)   550 milliGRAM(s) Oral (20 @ 05:57)   550 milliGRAM(s) Oral (19 @ 17:23)   550 milliGRAM(s) Oral (19 @ 05:45)   550 milliGRAM(s) Oral (19 @ 18:11)   550 milliGRAM(s) Oral (19 @ 05:43)   550 milliGRAM(s) Oral (19 @ 17:43)   550 milliGRAM(s) Oral (19 @ 05:42)   550 milliGRAM(s) Oral (19 @ 17:24)   550 milliGRAM(s) Oral (19 @ 05:28)   550 milliGRAM(s) Oral (19 @ 17:51)   550 milliGRAM(s) Oral (19 @ 05:42)      ANTIMICROBIALS:    rifAXIMin 550 two times a day    OTHER MEDS: MEDICATIONS  (STANDING):  dextrose 40% Gel 15 once PRN  dextrose 50% Injectable 12.5 once  dextrose 50% Injectable 25 once  dextrose 50% Injectable 25 once  furosemide    Tablet 20 daily  glucagon  Injectable 1 once PRN  insulin glargine Injectable (LANTUS) 24 at bedtime  insulin lispro (HumaLOG) corrective regimen sliding scale  three times a day before meals  insulin lispro (HumaLOG) corrective regimen sliding scale  at bedtime  insulin lispro Injectable (HumaLOG) 8 three times a day before meals  lactulose Syrup 20 three times a day  midodrine. 20 three times a day  pantoprazole    Tablet 40 two times a day  spironolactone 50 daily  tamsulosin 0.4 at bedtime    SOCIAL HISTORY:   denies smoking, drug use, formerly used alcohol    FAMILY HISTORY:  Family history of type 2 diabetes mellitus  Family history of hypertension  Family history of stomach cancer    REVIEW OF SYSTEMS  [  ] ROS unobtainable because:    [x  ] All other systems negative except as noted below:	    Constitutional:  [ ] fever [ ] chills  [ ] weight loss  [ ] weakness  Skin:  [ ] rash [ ] phlebitis	  Eyes: [ ] icterus [ ] pain  [ ] discharge	  ENMT: [ ] sore throat  [ ] thrush [ ] ulcers [ ] exudates  Respiratory: [ ] dyspnea [ ] hemoptysis [ ] cough [ ] sputum	  Cardiovascular:  [ ] chest pain [ ] palpitations [ ] edema	  Gastrointestinal:  [ ] nausea [ ] vomiting [ ] diarrhea [ ] constipation [ ] pain	  Genitourinary:  [ ] dysuria [ ] frequency [ ] hematuria [ ] discharge [ ] flank pain  [ ] incontinence  Musculoskeletal:  [ ] myalgias [ ] arthralgias [ ] arthritis  [ ] back pain  Neurological:  [ ] headache [ ] seizures  [ ] confusion/altered mental status  Psychiatric:  [ ] anxiety [ ] depression	  Hematology/Lymphatics:  [ ] lymphadenopathy  Endocrine:  [ ] adrenal [ ] thyroid  Allergic/Immunologic:	 [ ] transplant [ ] seasonal    Vital Signs Last 24 Hrs  T(F): 99.6 (20 @ 12:05), Max: 100.8 (20 @ 20:59)  Vital Signs Last 24 Hrs  HR: 89 (20 @ 12:05) (85 - 92)  BP: 119/65 (20 @ 12:05) (101/56 - 119/65)  RR: 18 (20 @ 12:05)  SpO2: 97% (20 @ 12:05) (95% - 97%)  Wt(kg): --    PHYSICAL EXAM:  Constitutional: non-toxic, no distress, awake  HEAD/EYES: atraumatic, anicteric, no conjunctival injection  ENT:  supple, no sores, no thrush  Cardiovascular:   normal S1, S2, no murmur, no edema  Respiratory:  equal breath sounds, no wheezes, no rales  GI:  soft, non-tender, normal bowel sounds  :  no bob, no CVA tenderness  Musculoskeletal:  no significant effusion of left knee noted, no limitations in active ROM. Left calf hematoma without erythema, TTP, mildly firm. Mild pitting edema LLE  Neurologic: awake and alert,  normal strength, no focal findings  Skin:  no rash, no erythema, no phlebitis  Heme/Onc: no lymphadenopathy   Psychiatric:  awake, alert, appropriate mood                            8.7    17.16 )-----------( 78       ( 2020 08:19 )             25.7     01-06    129<L>  |  97  |  16  ----------------------------<  128<H>  3.7   |  22  |  1.12    Ca    8.5      2020 06:45    TPro  5.5<L>  /  Alb  2.5<L>  /  TBili  11.4<H>  /  DBili  x   /  AST  23  /  ALT  13  /  AlkPhos  111  -06    Urinalysis Basic - ( 2020 07:26 )    Color: Dark Yellow / Appearance: Slightly Turbid / S.025 / pH: x  Gluc: x / Ketone: Trace  / Bili: Small / Urobili: 6 mg/dL   Blood: x / Protein: 30 mg/dL / Nitrite: Negative   Leuk Esterase: Large / RBC: 8 /hpf /  /HPF   Sq Epi: x / Non Sq Epi: 0 /hpf / Bacteria: Many    MICROBIOLOGY:  Culture - Blood (collected 2020 02:10)  Source: .Blood Blood-Peripheral  Preliminary Report (2020 03:01):    No growth to date.    Culture - Blood (collected 2020 02:10)  Source: .Blood Blood-Peripheral  Preliminary Report (2020 03:01):    No growth to date.            CMV IgG Antibody: <0.20 U/mL (19 @ 08:33)  Toxoplasma IgG Screen: <3.0 IU/mL (19 @ 08:33)        RADIOLOGY:  < from: Xray Chest 1 View- PORTABLE-Urgent (20 @ 22:12) >  Heart is magnified by technique.  The lungs are clear. No pleural effusion or pneumothorax  No acute bony findings.      < from: CT Angio Lower Extremity w/ IV Cont, Left (20 @ 17:06) >  IMPRESSION:     Hematoma within the left vastus intermedialis musculature. No evidence of active bleeding.    No evidence of a significant left lower extremity arterial stenosis or occlusion.    Left knee joint effusion.     Diffuse lower extremity subcutaneous edema.      < from: CT Abdomen and Pelvis No Cont (19 @ 12:25) >  IMPRESSION:     No retroperitoneal hemorrhage.    Anasarca.    < from: MR Abdomen w/wo IV Cont (19 @ 13:46) >  IMPRESSION:     Cirrhosis with evidence of portal hypertension.    No focal hepatic lesion.    Standard hepatic arterial anatomy with widely patent celiac axis and SMA.   SMV and portal veins are patent as above.      < end of copied text > Patient is a 64y old  Male who presents with a chief complaint of low Hgb (2020 10:05)    HPI:  Mr. Segundo is a 65 y/o male with a PMH of GERD w/ esophagitis, T2DM on insulin, HLD, non-alcoholic cirrhosis w/ hepatic encephalopathy and SBP, HFpEF, GIB, and chronic thrombocytopenia presented to the ED from [facility] rehab for low hemoglobin.    Since falling incidence, patient has been unable to ambulate and has been referred to rehab, where he is now. He was found to be anemic to 6.7 on , and 1u pRBC was transfused with appropriate response (7.8). Patient visited hepatology office today for further evaluation as liver transplant candidate, where his Hgb was found to be 6.1. Patient remained hemodynamically stable with no active symptoms other than lower left calf pain with movements, yet given recurrent hemoglobin drop, patient was referred to [facility].  Patient remains asymptomatic and denies any headache, CP, palpitation, SOB, lightheadedness, visual disturbance, fever, chills, cough. Denies any melena/hematochezia/hematuria.  He still reports having anteriolateral thigh pain, that is aggravated with movements. Recent CT and US were unrevealing.    In ED, temp 98.2, HR 80, /63, RR 18, SpO2 98% on RA.  IV protonix 80mg x1. (27 Dec 2019 02:58)    HPI and Hospital course reviewed. 65 y/o M PMHx of IDDMII, HfpEF, decompensated JEAN cirrhosis, hepatic encephalopathy, previous episode of SBP, previous GBS bacteremia and ESBL UTI, was sent from rehab for acute on chronic anemia, HE, and LLE hematoma. Pt had EGD on  which showed small varices not amenable to banding, GAVE s/p APC. He received 5 days of CTX earlier in hospitalization. Rest of work up remarkable for CTA LLE showing hematoma, left knee effusion, CT A/P, abdominal sonogram with trace ascites. On  pt had isolated low grade fever to 100.8, the following day developed leukocytosis now increasing to 17.16. UA with significan No steroid use noted, Bcx  NTD with unremarkable CXR . UA with significant pyuria and bacteria.    Pt currently denies fever, chills, cough, SOB, abdominal pain, diarrhea, dysuria pain at LLE.     prior hospital charts reviewed [ x ]  primary team notes reviewed [x  ]  other consultant notes reviewed [ x ]  PAST MEDICAL & SURGICAL HISTORY:  GIB (gastrointestinal bleeding)  GERD with esophagitis: Gastritis &amp; Non Bleeding Ulcers  Hepatic encephalopathy  Obesity  Fatty liver disease, nonalcoholic  Renal stones: 25 years ago  Hypertension  Neuropathy  Hypercholesteremia  Diabetes  S/P cholecystectomy    Allergies  codeine (Anaphylaxis)    ANTIMICROBIALS (past 90 days)  MEDICATIONS  (STANDING):  cefTRIAXone   IVPB   100 mL/Hr IV Intermittent (19 @ 05:47)   100 mL/Hr IV Intermittent (19 @ 05:43)   100 mL/Hr IV Intermittent (19 @ 05:41)   100 mL/Hr IV Intermittent (19 @ 05:28)   100 mL/Hr IV Intermittent (19 @ 05:42)    rifAXIMin   550 milliGRAM(s) Oral (20 @ 05:14)   550 milliGRAM(s) Oral (20 @ 17:42)   550 milliGRAM(s) Oral (20 @ 06:17)   550 milliGRAM(s) Oral (20 @ 05:55)   550 milliGRAM(s) Oral (20 @ 17:53)   550 milliGRAM(s) Oral (20 @ 06:01)   550 milliGRAM(s) Oral (20 @ 17:54)   550 milliGRAM(s) Oral (20 @ 05:44)   550 milliGRAM(s) Oral (20 @ 17:16)   550 milliGRAM(s) Oral (20 @ 05:57)   550 milliGRAM(s) Oral (19 @ 17:23)   550 milliGRAM(s) Oral (19 @ 05:45)   550 milliGRAM(s) Oral (19 @ 18:11)   550 milliGRAM(s) Oral (19 @ 05:43)   550 milliGRAM(s) Oral (19 @ 17:43)   550 milliGRAM(s) Oral (19 @ 05:42)   550 milliGRAM(s) Oral (19 @ 17:24)   550 milliGRAM(s) Oral (19 @ 05:28)   550 milliGRAM(s) Oral (19 @ 17:51)   550 milliGRAM(s) Oral (19 @ 05:42)      ANTIMICROBIALS:    rifAXIMin 550 two times a day    OTHER MEDS: MEDICATIONS  (STANDING):  dextrose 40% Gel 15 once PRN  dextrose 50% Injectable 12.5 once  dextrose 50% Injectable 25 once  dextrose 50% Injectable 25 once  furosemide    Tablet 20 daily  glucagon  Injectable 1 once PRN  insulin glargine Injectable (LANTUS) 24 at bedtime  insulin lispro (HumaLOG) corrective regimen sliding scale  three times a day before meals  insulin lispro (HumaLOG) corrective regimen sliding scale  at bedtime  insulin lispro Injectable (HumaLOG) 8 three times a day before meals  lactulose Syrup 20 three times a day  midodrine. 20 three times a day  pantoprazole    Tablet 40 two times a day  spironolactone 50 daily  tamsulosin 0.4 at bedtime    SOCIAL HISTORY:   denies smoking, drug use, formerly used alcohol    FAMILY HISTORY:  Family history of type 2 diabetes mellitus  Family history of hypertension  Family history of stomach cancer    REVIEW OF SYSTEMS  [  ] ROS unobtainable because:    [x  ] All other systems negative except as noted below:	    Constitutional:  [ ] fever [ ] chills  [ ] weight loss  [ ] weakness  Skin:  [ ] rash [ ] phlebitis	  Eyes: [ ] icterus [ ] pain  [ ] discharge	  ENMT: [ ] sore throat  [ ] thrush [ ] ulcers [ ] exudates  Respiratory: [ ] dyspnea [ ] hemoptysis [ ] cough [ ] sputum	  Cardiovascular:  [ ] chest pain [ ] palpitations [ ] edema	  Gastrointestinal:  [ ] nausea [ ] vomiting [ ] diarrhea [ ] constipation [ ] pain	  Genitourinary:  [ ] dysuria [ ] frequency [ ] hematuria [ ] discharge [ ] flank pain  [ ] incontinence  Musculoskeletal:  [ ] myalgias [ ] arthralgias [ ] arthritis  [ ] back pain  Neurological:  [ ] headache [ ] seizures  [ ] confusion/altered mental status  Psychiatric:  [ ] anxiety [ ] depression	  Hematology/Lymphatics:  [ ] lymphadenopathy  Endocrine:  [ ] adrenal [ ] thyroid  Allergic/Immunologic:	 [ ] transplant [ ] seasonal    Vital Signs Last 24 Hrs  T(F): 99.6 (01-06-20 @ 12:05), Max: 100.8 (20 @ 20:59)  Vital Signs Last 24 Hrs  HR: 89 (20 @ 12:05) (85 - 92)  BP: 119/65 (20 @ 12:05) (101/56 - 119/65)  RR: 18 (20 @ 12:05)  SpO2: 97% (20 @ 12:05) (95% - 97%)  Wt(kg): --    PHYSICAL EXAM:  Constitutional: non-toxic, no distress, awake  HEAD/EYES: atraumatic, anicteric, no conjunctival injection  ENT:  supple, no sores, no thrush  Cardiovascular:   normal S1, S2, no murmur, no edema  Respiratory:  equal breath sounds, no wheezes, no rales  GI:  soft, non-tender, normal bowel sounds  :  no bob, no CVA tenderness  Musculoskeletal:  no significant effusion of left knee noted, no limitations in active ROM. Left calf hematoma without erythema, TTP, mildly firm. Mild pitting edema LLE  Neurologic: awake and alert,  normal strength, no focal findings  Skin:  no rash, no erythema, no phlebitis  Heme/Onc: no lymphadenopathy   Psychiatric:  awake, alert, appropriate mood                            8.7    17.16 )-----------( 78       ( 2020 08:19 )             25.7         129<L>  |  97  |  16  ----------------------------<  128<H>  3.7   |  22  |  1.12    Ca    8.5      2020 06:45    TPro  5.5<L>  /  Alb  2.5<L>  /  TBili  11.4<H>  /  DBili  x   /  AST  23  /  ALT  13  /  AlkPhos  111  06    Urinalysis Basic - ( 2020 07:26 )    Color: Dark Yellow / Appearance: Slightly Turbid / S.025 / pH: x  Gluc: x / Ketone: Trace  / Bili: Small / Urobili: 6 mg/dL   Blood: x / Protein: 30 mg/dL / Nitrite: Negative   Leuk Esterase: Large / RBC: 8 /hpf /  /HPF   Sq Epi: x / Non Sq Epi: 0 /hpf / Bacteria: Many    MICROBIOLOGY:  Culture - Blood (collected 2020 02:10)  Source: .Blood Blood-Peripheral  Preliminary Report (2020 03:01):    No growth to date.    Culture - Blood (collected 2020 02:10)  Source: .Blood Blood-Peripheral  Preliminary Report (2020 03:01):    No growth to date.            CMV IgG Antibody: <0.20 U/mL (19 @ 08:33)  Toxoplasma IgG Screen: <3.0 IU/mL (19 @ 08:33)        RADIOLOGY:  < from: Xray Chest 1 View- PORTABLE-Urgent (20 @ 22:12) >  Heart is magnified by technique.  The lungs are clear. No pleural effusion or pneumothorax  No acute bony findings.      < from: CT Angio Lower Extremity w/ IV Cont, Left (20 @ 17:06) >  IMPRESSION:     Hematoma within the left vastus intermedialis musculature. No evidence of active bleeding.    No evidence of a significant left lower extremity arterial stenosis or occlusion.    Left knee joint effusion.     Diffuse lower extremity subcutaneous edema.      < from: CT Abdomen and Pelvis No Cont (19 @ 12:25) >  IMPRESSION:     No retroperitoneal hemorrhage.    Anasarca.    < from: MR Abdomen w/wo IV Cont (19 @ 13:46) >  IMPRESSION:     Cirrhosis with evidence of portal hypertension.    No focal hepatic lesion.    Standard hepatic arterial anatomy with widely patent celiac axis and SMA.   SMV and portal veins are patent as above.      < end of copied text > Patient is a 64y old  Male who presents with a chief complaint of low Hgb (2020 10:05)    HPI:  Mr. Segundo is a 63 y/o male with a PMH of GERD w/ esophagitis, T2DM on insulin, HLD, non-alcoholic cirrhosis w/ hepatic encephalopathy and SBP, HFpEF, GIB, and chronic thrombocytopenia presented to the ED from [facility] rehab for low hemoglobin.    Since falling incidence, patient has been unable to ambulate and has been referred to rehab, where he is now. He was found to be anemic to 6.7 on , and 1u pRBC was transfused with appropriate response (7.8). Patient visited hepatology office today for further evaluation as liver transplant candidate, where his Hgb was found to be 6.1. Patient remained hemodynamically stable with no active symptoms other than lower left calf pain with movements, yet given recurrent hemoglobin drop, patient was referred to [facility].  Patient remains asymptomatic and denies any headache, CP, palpitation, SOB, lightheadedness, visual disturbance, fever, chills, cough. Denies any melena/hematochezia/hematuria.  He still reports having anteriolateral thigh pain, that is aggravated with movements. Recent CT and US were unrevealing.    In ED, temp 98.2, HR 80, /63, RR 18, SpO2 98% on RA.  IV protonix 80mg x1. (27 Dec 2019 02:58)    HPI and Hospital course reviewed. 63 y/o M PMHx of IDDMII, HfpEF, decompensated JEAN cirrhosis, hepatic encephalopathy, previous episode of SBP, previous GBS bacteremia and ESBL UTI, was sent from rehab for acute on chronic anemia, HE, and LLE hematoma. Pt had EGD on  which showed small varices not amenable to banding, GAVE s/p APC. He received 5 days of CTX earlier in hospitalization. Rest of work up remarkable for CTA LLE showing hematoma, left knee effusion, CT A/P, abdominal sonogram with trace ascites. On  pt had isolated low grade fever to 100.8, the following day developed leukocytosis now increasing to 17.16. UA with significan No steroid use noted, Bcx  NTD with unremarkable CXR . UA with significant pyuria and bacteria.    Pt currently denies fever, chills, cough, SOB, abdominal pain, diarrhea, dysuria pain at LLE.     prior hospital charts reviewed [ x ]  primary team notes reviewed [x  ]  other consultant notes reviewed [ x ]  PAST MEDICAL & SURGICAL HISTORY:  GIB (gastrointestinal bleeding)  GERD with esophagitis: Gastritis &amp; Non Bleeding Ulcers  Hepatic encephalopathy  Obesity  Fatty liver disease, nonalcoholic  Renal stones: 25 years ago  Hypertension  Neuropathy  Hypercholesteremia  Diabetes  S/P cholecystectomy    Allergies  codeine (Anaphylaxis)    ANTIMICROBIALS (past 90 days)  MEDICATIONS  (STANDING):  cefTRIAXone   IVPB   100 mL/Hr IV Intermittent (19 @ 05:47)   100 mL/Hr IV Intermittent (19 @ 05:43)   100 mL/Hr IV Intermittent (19 @ 05:41)   100 mL/Hr IV Intermittent (19 @ 05:28)   100 mL/Hr IV Intermittent (19 @ 05:42)    rifAXIMin   550 milliGRAM(s) Oral (20 @ 05:14)   550 milliGRAM(s) Oral (20 @ 17:42)   550 milliGRAM(s) Oral (20 @ 06:17)   550 milliGRAM(s) Oral (20 @ 05:55)   550 milliGRAM(s) Oral (20 @ 17:53)   550 milliGRAM(s) Oral (20 @ 06:01)   550 milliGRAM(s) Oral (20 @ 17:54)   550 milliGRAM(s) Oral (20 @ 05:44)   550 milliGRAM(s) Oral (20 @ 17:16)   550 milliGRAM(s) Oral (20 @ 05:57)   550 milliGRAM(s) Oral (19 @ 17:23)   550 milliGRAM(s) Oral (19 @ 05:45)   550 milliGRAM(s) Oral (19 @ 18:11)   550 milliGRAM(s) Oral (19 @ 05:43)   550 milliGRAM(s) Oral (19 @ 17:43)   550 milliGRAM(s) Oral (19 @ 05:42)   550 milliGRAM(s) Oral (19 @ 17:24)   550 milliGRAM(s) Oral (19 @ 05:28)   550 milliGRAM(s) Oral (19 @ 17:51)   550 milliGRAM(s) Oral (19 @ 05:42)      ANTIMICROBIALS:    rifAXIMin 550 two times a day    OTHER MEDS: MEDICATIONS  (STANDING):  dextrose 40% Gel 15 once PRN  dextrose 50% Injectable 12.5 once  dextrose 50% Injectable 25 once  dextrose 50% Injectable 25 once  furosemide    Tablet 20 daily  glucagon  Injectable 1 once PRN  insulin glargine Injectable (LANTUS) 24 at bedtime  insulin lispro (HumaLOG) corrective regimen sliding scale  three times a day before meals  insulin lispro (HumaLOG) corrective regimen sliding scale  at bedtime  insulin lispro Injectable (HumaLOG) 8 three times a day before meals  lactulose Syrup 20 three times a day  midodrine. 20 three times a day  pantoprazole    Tablet 40 two times a day  spironolactone 50 daily  tamsulosin 0.4 at bedtime    SOCIAL HISTORY:   denies smoking, drug use, formerly used alcohol    FAMILY HISTORY:  Family history of type 2 diabetes mellitus  Family history of hypertension  Family history of stomach cancer    REVIEW OF SYSTEMS  [  ] ROS unobtainable because:    [x  ] All other systems negative except as noted below:	    Constitutional:  [ ] fever [ ] chills  [ ] weight loss  [ ] weakness  Skin:  [ ] rash [ ] phlebitis	  Eyes: [ ] icterus [ ] pain  [ ] discharge	  ENMT: [ ] sore throat  [ ] thrush [ ] ulcers [ ] exudates  Respiratory: [ ] dyspnea [ ] hemoptysis [ ] cough [ ] sputum	  Cardiovascular:  [ ] chest pain [ ] palpitations [ ] edema	  Gastrointestinal:  [ ] nausea [ ] vomiting [ ] diarrhea [ ] constipation [ ] pain	  Genitourinary:  [ ] dysuria [ ] frequency [ ] hematuria [ ] discharge [ ] flank pain  [ ] incontinence  Musculoskeletal:  [ ] myalgias [ ] arthralgias [ ] arthritis  [ ] back pain  Neurological:  [ ] headache [ ] seizures  [ ] confusion/altered mental status  Psychiatric:  [ ] anxiety [ ] depression	  Hematology/Lymphatics:  [ ] lymphadenopathy  Endocrine:  [ ] adrenal [ ] thyroid  Allergic/Immunologic:	 [ ] transplant [ ] seasonal    Vital Signs Last 24 Hrs  T(F): 99.6 (01-06-20 @ 12:05), Max: 100.8 (20 @ 20:59)  Vital Signs Last 24 Hrs  HR: 89 (20 @ 12:05) (85 - 92)  BP: 119/65 (20 @ 12:05) (101/56 - 119/65)  RR: 18 (20 @ 12:05)  SpO2: 97% (20 @ 12:05) (95% - 97%)  Wt(kg): --    PHYSICAL EXAM:  Constitutional: non-toxic, no distress, awake  HEAD/EYES: atraumatic, anicteric, no conjunctival injection  ENT:  supple, no sores, no thrush  Cardiovascular:   normal S1, S2, no murmur, no edema  Respiratory:  equal breath sounds, no wheezes, no rales  GI:  soft, non-tender, normal bowel sounds  :  no bob, no CVA tenderness  Musculoskeletal:  no significant effusion of left knee noted, no limitations in active ROM. Left lateral thigh hematoma without erythema, TTP, mildly firm. Mild pitting edema LLE  Neurologic: awake and alert,  normal strength, no focal findings  Skin:  no rash, no erythema, no phlebitis  Heme/Onc: no lymphadenopathy   Psychiatric:  awake, alert, appropriate mood                            8.7    17.16 )-----------( 78       ( 2020 08:19 )             25.7         129<L>  |  97  |  16  ----------------------------<  128<H>  3.7   |  22  |  1.12    Ca    8.5      2020 06:45    TPro  5.5<L>  /  Alb  2.5<L>  /  TBili  11.4<H>  /  DBili  x   /  AST  23  /  ALT  13  /  AlkPhos  111      Urinalysis Basic - ( 2020 07:26 )    Color: Dark Yellow / Appearance: Slightly Turbid / S.025 / pH: x  Gluc: x / Ketone: Trace  / Bili: Small / Urobili: 6 mg/dL   Blood: x / Protein: 30 mg/dL / Nitrite: Negative   Leuk Esterase: Large / RBC: 8 /hpf /  /HPF   Sq Epi: x / Non Sq Epi: 0 /hpf / Bacteria: Many    MICROBIOLOGY:  Culture - Blood (collected 2020 02:10)  Source: .Blood Blood-Peripheral  Preliminary Report (2020 03:01):    No growth to date.    Culture - Blood (collected 2020 02:10)  Source: .Blood Blood-Peripheral  Preliminary Report (2020 03:01):    No growth to date.            CMV IgG Antibody: <0.20 U/mL (19 @ 08:33)  Toxoplasma IgG Screen: <3.0 IU/mL (19 @ 08:33)        RADIOLOGY:  < from: Xray Chest 1 View- PORTABLE-Urgent (20 @ 22:12) >  Heart is magnified by technique.  The lungs are clear. No pleural effusion or pneumothorax  No acute bony findings.      < from: CT Angio Lower Extremity w/ IV Cont, Left (20 @ 17:06) >  IMPRESSION:     Hematoma within the left vastus intermedialis musculature. No evidence of active bleeding.    No evidence of a significant left lower extremity arterial stenosis or occlusion.    Left knee joint effusion.     Diffuse lower extremity subcutaneous edema.      < from: CT Abdomen and Pelvis No Cont (19 @ 12:25) >  IMPRESSION:     No retroperitoneal hemorrhage.    Anasarca.    < from: MR Abdomen w/wo IV Cont (19 @ 13:46) >  IMPRESSION:     Cirrhosis with evidence of portal hypertension.    No focal hepatic lesion.    Standard hepatic arterial anatomy with widely patent celiac axis and SMA.   SMV and portal veins are patent as above.      < end of copied text >

## 2020-01-06 NOTE — PROGRESS NOTE ADULT - SUBJECTIVE AND OBJECTIVE BOX
[facility] - Sushila Dhaliwal, Doug, Morenita, Ayo, Colleen Williamson  Office Number:  [phone]    Patient resting comfortably in bed in NAD.  Laying flat with no respiratory distress.  No complaints of chest pain, dyspnea, palpitations, PND, or orthopnea.  feeling well. Reports an uneventful weekend.    ROS: negative unless otherwise mentioned.    Telemetry:  off    MEDICATIONS  (STANDING):  dextrose 5%. 1000 milliLiter(s) (50 mL/Hr) IV Continuous <Continuous>  dextrose 50% Injectable 12.5 Gram(s) IV Push once  dextrose 50% Injectable 25 Gram(s) IV Push once  dextrose 50% Injectable 25 Gram(s) IV Push once  ferrous    sulfate 325 milliGRAM(s) Oral daily  folic acid 1 milliGRAM(s) Oral daily  furosemide    Tablet 20 milliGRAM(s) Oral daily  insulin glargine Injectable (LANTUS) 24 Unit(s) SubCutaneous at bedtime  insulin lispro (HumaLOG) corrective regimen sliding scale   SubCutaneous three times a day before meals  insulin lispro (HumaLOG) corrective regimen sliding scale   SubCutaneous at bedtime  insulin lispro Injectable (HumaLOG) 8 Unit(s) SubCutaneous three times a day before meals  lactulose Syrup 20 Gram(s) Oral three times a day  midodrine. 20 milliGRAM(s) Oral three times a day  pantoprazole    Tablet 40 milliGRAM(s) Oral two times a day  rifAXIMin 550 milliGRAM(s) Oral two times a day  spironolactone 50 milliGRAM(s) Oral daily  tamsulosin 0.4 milliGRAM(s) Oral at bedtime    MEDICATIONS  (PRN):  dextrose 40% Gel 15 Gram(s) Oral once PRN Blood Glucose LESS THAN 70 milliGRAM(s)/deciliter  glucagon  Injectable 1 milliGRAM(s) IntraMuscular once PRN Glucose LESS THAN 70 milligrams/deciliter      Allergies    codeine (Anaphylaxis)    Intolerances    NO  RED MEAT (Unknown)      Vital Signs Last 24 Hrs  T(C): 37 (06 Jan 2020 05:06), Max: 37.9 (05 Jan 2020 21:29)  T(F): 98.6 (06 Jan 2020 05:06), Max: 100.2 (05 Jan 2020 21:29)  HR: 85 (06 Jan 2020 05:06) (85 - 92)  BP: 101/58 (06 Jan 2020 05:06) (101/56 - 111/61)  BP(mean): --  RR: 18 (06 Jan 2020 05:06) (18 - 18)  SpO2: 95% (06 Jan 2020 05:06) (95% - 96%)    I&O's Summary    05 Jan 2020 07:01  -  06 Jan 2020 07:00  --------------------------------------------------------  IN: 720 mL / OUT: 380 mL / NET: 340 mL        ON EXAM:    Constitutional: NAD, awake    HEENT: Moist Mucous Membranes, Anicteric  Pulmonary: Decreased breath sounds b/l. No rales, crackles or wheeze appreciated.   Cardiovascular: Regular, S1 and S2, No murmurs, rubs, gallops or clicks  Gastrointestinal: Bowel Sounds present, soft, nontender.   Lymph: L>R edema in lower ext  Skin: No visible rashes or ulcers.  Psych:  Mood & affect appropriate for situation    LABS: All Labs Reviewed:                        8.7    17.16 )-----------( 78       ( 06 Jan 2020 08:19 )             25.7                         7.8    12.25 )-----------( 79       ( 05 Jan 2020 07:26 )             24.1                         7.5    6.70  )-----------( 73       ( 04 Jan 2020 06:41 )             23.4     06 Jan 2020 06:45    129    |  97     |  16     ----------------------------<  128    3.7     |  22     |  1.12   05 Jan 2020 07:26    132    |  99     |  14     ----------------------------<  113    3.6     |  24     |  1.06   04 Jan 2020 06:41    136    |  103    |  15     ----------------------------<  96     4.0     |  22     |  1.07     Ca    8.5        06 Jan 2020 06:45  Ca    8.4        05 Jan 2020 07:26  Ca    8.4        04 Jan 2020 06:41    TPro  5.5    /  Alb  2.5    /  TBili  11.4   /  DBili  x      /  AST  23     /  ALT  13     /  AlkPhos  111    06 Jan 2020 06:45  TPro  5.2    /  Alb  2.5    /  TBili  9.9    /  DBili  x      /  AST  26     /  ALT  14     /  AlkPhos  115    05 Jan 2020 07:26  TPro  5.0    /  Alb  2.3    /  TBili  9.2    /  DBili  x      /  AST  26     /  ALT  17     /  AlkPhos  114    04 Jan 2020 06:41    PT/INR - ( 06 Jan 2020 08:10 )   PT: 25.2 sec;   INR: 2.16 ratio               Blood Culture: Organism --  Gram Stain Blood -- Gram Stain --  Specimen Source .Blood Blood-Peripheral  Culture-Blood --

## 2020-01-06 NOTE — PROGRESS NOTE ADULT - SUBJECTIVE AND OBJECTIVE BOX
INTERVAL HPI/OVERNIGHT EVENTS:    overnight events noted; low-grade fever and worsening leukocytosis   upon eval earlier this Am, patient tolerating diet. Denies n/v/d/c abd pain   H/H improved this AM     Allergies    codeine (Anaphylaxis)    Intolerances    NO  RED MEAT (Unknown)    General:  No wt loss, fevers, chills, night sweats, fatigue,   Eyes:  Good vision, no reported pain  ENT:  No sore throat, pain, runny nose, dysphagia  CV:  No pain, palpitations, hypo/hypertension  Resp:  No dyspnea, cough, tachypnea, wheezing  GI:  No pain, No nausea, No vomiting, No diarrhea, No constipation, No weight loss, No fever, No pruritis, No rectal bleeding, No tarry stools, No dysphagia,  :  No pain, bleeding, incontinence, nocturia  Muscle:  No pain, weakness  Neuro:  No weakness, tingling, memory problems  Psych:  No fatigue, insomnia, mood problems, depression  Endocrine:  No polyuria, polydipsia, cold/heat intolerance  Heme:  No petechiae, ecchymosis, easy bruisability  Skin:  No rash, tattoos, scars, edema      PHYSICAL EXAM:   Vital Signs:  Vital Signs Last 24 Hrs  T(C): 37 (2020 14:40), Max: 38.2 (2020 20:59)  T(F): 98.6 (2020 14:40), Max: 100.8 (2020 20:59)  HR: 87 (2020 14:40) (84 - 94)  BP: 111/61 (2020 14:40) (111/61 - 138/76)  BP(mean): --  RR: 18 (2020 14:40) (17 - 18)  SpO2: 96% (2020 14:40) (95% - 97%)  Daily     Daily Weight in k.4 (2020 04:46)I&O's Summary    2020 07:01  -  2020 07:00  --------------------------------------------------------  IN: 840 mL / OUT: 2600 mL / NET: -1760 mL    2020 07:01  -  2020 15:26  --------------------------------------------------------  IN: 480 mL / OUT: 0 mL / NET: 480 mL        GENERAL:  lying in bed, NAD   HEENT:  NC/AT  ABDOMEN:  obese, Soft, non-tender, softly distended, normoactive bowel sounds  EXTEREMITIES:  L calf hematoma, LLE edema   NEURO:  Alert, oriented, no asterixis, no tremor, no encephalopathy      LABS:                        7.8    12.25 )-----------( 79       ( 2020 07:26 )             24.1     01-    132<L>  |  99  |  14  ----------------------------<  113<H>  3.6   |  24  |  1.06    Ca    8.4      2020 07:26    TPro  5.2<L>  /  Alb  2.5<L>  /  TBili  9.9<H>  /  DBili  x   /  AST  26  /  ALT  14  /  AlkPhos  115  01-05    PT/INR - ( 2020 07:26 )   PT: 24.4 sec;   INR: 2.07 ratio           Urinalysis Basic - ( 2020 07:26 )    Color: Dark Yellow / Appearance: Slightly Turbid / S.025 / pH: x  Gluc: x / Ketone: Trace  / Bili: Small / Urobili: 6 mg/dL   Blood: x / Protein: 30 mg/dL / Nitrite: Negative   Leuk Esterase: Large / RBC: 8 /hpf /  /HPF   Sq Epi: x / Non Sq Epi: 0 /hpf / Bacteria: Many      amylase   lipase  RADIOLOGY & ADDITIONAL TESTS:

## 2020-01-06 NOTE — PROGRESS NOTE ADULT - ASSESSMENT
cirrhosis  anemia   h/o duodenal ulcer    hepatology eval noted  ID eval noted; abx started. 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 Treated with argon plasma coagulation (APC)  trendy h/h daily, transfuse prn  Transplant surgery eval noted

## 2020-01-06 NOTE — PROGRESS NOTE ADULT - ASSESSMENT
63 year old male with HTN, DM2, non-alcoholic cirrhosis, who was recently admitted with altered mental status, hyponatremia and high ammonia level. We last saw him during a hospitalization in 10/2019, during which he was significantly volume overloaded and required iv diuresis.  He is currently being evaluated for liver transplant.  He returns last week with acute blood loss anemia. CT with trace pleural effusions, anasarca, and atherosclerotic calcifications. He has a left thigh hematoma.  EGD yesterday with mild gave s/p APC treatment    Abnormal EKG  - His EKGs have demonstrated prolonged qtc in the past  - repeat ekg shows normal QT  - avoid qtc prolonging medications and replete K to greater than 4 and mg > 2     Diastolic HF   - CXR is clear and CT with only trace effusions. Though still with lower ext edema  - he does have some le edema in the setting of his hypoalbuminemia and diastolic dysfunction.  Now on Lasix and spironolactone.    - TTE normal LV systolic function EF 65%; LVH, DD Grade I  - cath with normal filling pressures and no significant cad.  - no asa in setting of acute blood loss anemia.    HTN  - BP on softer side  - hold anti-hypertensives  - midodrine as needed    Anemia  - transfuse to keep Hb >8. trend H/H  - GI follow up appreciated  - monitor thigh hematoma.    - All other workup as per primary team

## 2020-01-06 NOTE — CONSULT NOTE ADULT - ASSESSMENT
65 y/o M PMHx of IDDMII, HfpEF, decompensated JEAN cirrhosis, hepatic encephalopathy, previous episode of SBP, previous GBS bacteremia and ESBL UTI, was sent from rehab for acute on chronic anemia, HE, and LLE hematoma, s/p 5 day course of CTX now with isolated fever and increasing leukocytosis of unclear significance.    Isolated fever, increasing leukocytosis  -not clear patient is infected at this time. Isolated fever resolved, no localizing symptoms. Bcx negative, CXR unremarkable, without significant ascites on physical exam or previous imaging.  -could leukocytosis be secondary to bleed? Hb appears stable  -further recommendations pending discussion with attending 63 y/o M PMHx of IDDMII, HfpEF, decompensated JEAN cirrhosis, hepatic encephalopathy, previous episode of SBP, previous GBS bacteremia and ESBL UTI, was sent from rehab for acute on chronic anemia, HE, and LLE hematoma, s/p 5 day course of CTX now with isolated fever and increasing leukocytosis of unclear significance.    Isolated fever, increasing leukocytosis  -not clear patient is infected at this time. Isolated fever resolved, no localizing symptoms. Bcx negative, CXR unremarkable, without significant ascites on physical exam or previous imaging.  -could leukocytosis be secondary to bleed? Hb appears stable  -Pt with grossly positive UA, with Ucx pending, no urinary symptoms  -Believe leukocytosis could be secondary to bleed, but pt with escalating hyperbilirubinemia which could be signs of systemic infection would cover at this time with meropenem 1g q8 ( due to history of ESBL UTIs) and Vancomycin 1g q12 ( for possible superinfected hematoma). If Ucx negative would d/c meropenem, d/c Vancomycin if bcx negative for 48 hours 63 y/o M PMHx of IDDMII, HfpEF, decompensated JEAN cirrhosis, hepatic encephalopathy, previous episode of SBP, previous GBS bacteremia and ESBL UTI, was sent from rehab for acute on chronic anemia, HE, and LLE hematoma, s/p 5 day course of CTX now with isolated fever and increasing leukocytosis of unclear significance.    Isolated fever, increasing leukocytosis  -not clear patient is infected at this time. Isolated fever resolved, no localizing symptoms. Bcx negative, CXR unremarkable, without significant ascites on physical exam or previous imaging.  -could leukocytosis be secondary to bleed? Hb appears stable  -Pt with grossly positive UA, with Ucx pending, no urinary symptoms  -Believe leukocytosis could be secondary to bleed, but pt with escalating hyperbilirubinemia which could be signs of systemic infection would cover at this time with meropenem 1g q8 ( due to history of ESBL UTIs) and Vancomycin 1.25g q12 ( for possible superinfected hematoma). If Ucx negative would d/c meropenem, d/c Vancomycin if bcx negative for 48 hours

## 2020-01-06 NOTE — PROGRESS NOTE ADULT - ASSESSMENT
Impression:  65 yo M with IDDM, dyslipidemia, obesity, GERD, HFpEF with mild LV diastolic dysfunction, and decompensated JEAN cirrhosis complicated by ascites, history of SBP, hepatic encephalopathy, and chronic anemia with a history of duodenal ulcer as well as GAVE and duodenal AVM s/p APC (last on 10/11/19), who is UNOS listed for liver transplantation at [facility]. He was re-admitted from subacute rehab on 12/26/19 due to acute on chronic anemia without overt GI bleeding (found to have LLE hematoma), also with hepatic encephalopathy.    # Left leg swelling and pain      - Large 14cm left thigh hematoma  #Acute on chronic anemia     - acute component likely secondary to thigh hematoma above     - no active bleeding seen on CTA on 1/3/20      - s/p EGD 12/30 with mild GAVE s/p APC treatment  # Decompensated JEAN cirrhosis, MELD-Na 28 on 1/6     - varices: grade I <5mm varices on EGD 12/30 not amenable to banding; also mild GAVE s/p APC      - SPE: none currently but waxes and wanes.  On lactulose and Xifaxan     - ascites: trace, not on diuretics     - HCC: none on CT this admission     - Transplant: Blood Type A positive, UNOS listed with updated MELD-Na of 27 last week    Recommendations:  - monitor exam for size of hematoma and s/sx of compartment syndrome  - PPI PO BID for GAVE  - lasix 20mg PO daily and spironolactone 50mg daily   - PT consultation - patient needs aggressive PT and to move around most of day  - nutrition consultation.  Low salt diet  - Continue Glucerna TID with meals given low albumin  - lactulose TID and Xifaxan BID for HE   - folate, iron supplementation   - daily CBC, CMP, INR    Please call Hepatology ([phone]) if there are any additional questions or concerns. Please call on-call GI fellow after 5pm and before 8am, and on weekends. Impression:  65 yo M with IDDM, dyslipidemia, obesity, GERD, HFpEF with mild LV diastolic dysfunction, and decompensated JEAN cirrhosis complicated by ascites, history of SBP, hepatic encephalopathy, and chronic anemia with a history of duodenal ulcer as well as GAVE and duodenal AVM s/p APC (last on 10/11/19), who is UNOS listed for liver transplantation at [facility]. He was re-admitted from subacute rehab on 12/26/19 due to acute on chronic anemia without overt GI bleeding (found to have LLE hematoma), also with hepatic encephalopathy.    # Left leg swelling and pain      - Large 14cm left thigh hematoma  #Acute on chronic anemia     - acute component likely secondary to thigh hematoma above     - no active bleeding seen on CTA on 1/3/20      - s/p EGD 12/30 with mild GAVE s/p APC treatment  # Decompensated JEAN cirrhosis, MELD-Na 28 on 1/6/20     - varices: grade I <5mm varices on EGD 12/30 not amenable to banding; also mild GAVE s/p APC      - SPE: none currently but waxes and wanes.  On lactulose and Xifaxan     - ascites: trace, not on diuretics     - HCC: none on CT this admission     - Transplant: Blood Type A positive, UNOS listed with updated MELD-Na of 27 last week    Recommendations:  - daily CBC, CMP, INR  - monitor exam for size of hematoma and s/sx of compartment syndrome  - PPI PO BID for GAVE  - continue furosemide 20mg PO daily and spironolactone 50mg daily   - PT consultation - patient needs aggressive PT and to move around most of day  - nutrition consultation  - low salt diet  - Continue Glucerna TID with meals given low albumin  - lactulose TID and Xifaxan BID for HE   - folate, iron supplementation     Please call Hepatology ([phone]) if there are any additional questions or concerns. Please call on-call GI fellow after 5pm and before 8am, and on weekends. Impression:  65 yo M with IDDM, dyslipidemia, obesity, GERD, HFpEF with mild LV diastolic dysfunction, and decompensated JEAN cirrhosis complicated by ascites, history of SBP, hepatic encephalopathy, and chronic anemia with a history of duodenal ulcer as well as GAVE and duodenal AVM s/p APC (last on 10/11/19), who is UNOS listed for liver transplantation at [facility]. He was re-admitted from subacute rehab on 12/26/19 due to acute on chronic anemia without overt GI bleeding (found to have LLE hematoma), also with hepatic encephalopathy.    # Left leg swelling and pain      - Large 14cm left thigh hematoma  #Acute on chronic anemia     - acute component likely secondary to thigh hematoma above     - no active bleeding seen on CTA on 1/3/20      - s/p EGD 12/30 with mild GAVE s/p APC treatment  # Decompensated JEAN cirrhosis, MELD-Na 28 on 1/6/20     - varices: grade I <5mm varices on EGD 12/30 not amenable to banding; also mild GAVE s/p APC      - SPE: none currently but waxes and wanes.  On lactulose and Xifaxan     - ascites: trace, not on diuretics     - HCC: none on CT this admission     - Transplant: Blood Type A positive, UNOS listed with updated MELD-Na of 27 last week    Recommendations:  - daily CBC, CMP, INR  - monitor exam for size of hematoma and s/sx of compartment syndrome  - PPI PO BID for GAVE  - Increase furosemide to 40 mg PO daily and discontinue spironolactone  - 1.5L fluid restriction  - low salt diet  - Continue Glucerna TID with meals given low albumin  - lactulose TID and Xifaxan BID for HE   - folate, iron supplementation     Please call Hepatology ([phone]) if there are any additional questions or concerns. Please call on-call GI fellow after 5pm and before 8am, and on weekends.

## 2020-01-06 NOTE — CONSULT NOTE ADULT - ATTENDING COMMENTS
65 y/o M PMHx of IDDMII, HfpEF, decompensated JEAN cirrhosis, hepatic encephalopathy, previous episode of SBP, previous GBS bacteremia and ESBL UTI with left thigh hematoma and now leukocytosis and fever.    I suspect that leukocytosis and fever is reactive from hematoma  Lower suspicion for superinfected hematoma given physical examination (no tenderness to palpation at this time)  U/A with pyuria but no symptoms  Recent CT Scan with minimal ascites  At this point favor empirically covering with Vancomycin/Meropenem (prior ESBL UTI) pending UCx and BCx  Would trend WBC    I will continue to follow. Please feel free to contact me with any further questions.    Eusebio Pérez M.D.  [facility] Division of Infectious Disease  8AM-5PM: Pager Number [phone]  After Hours (or if no response): Please contact the Infectious Diseases Office at [phone] 63 y/o M PMHx of IDDMII, HfpEF, decompensated JEAN cirrhosis, hepatic encephalopathy, previous episode of SBP, previous GBS bacteremia and ESBL UTI with left thigh hematoma and now leukocytosis and fever.    Overall, Leukocytosis, Fever, Abnormal U/A, Pre-LT Evaluation    I suspect that leukocytosis and fever is reactive from hematoma  Lower suspicion for superinfected hematoma given physical examination (no tenderness to palpation at this time)  U/A with pyuria but no symptoms  Recent CT Scan with minimal ascites  At this point favor empirically covering with Vancomycin/Meropenem (prior ESBL UTI) pending UCx and BCx  Would trend WBC    I will continue to follow. Please feel free to contact me with any further questions.    Eusebio Pérez M.D.  [facility] Division of Infectious Disease  8AM-5PM: Pager Number [phone]  After Hours (or if no response): Please contact the Infectious Diseases Office at [phone]

## 2020-01-06 NOTE — PROGRESS NOTE ADULT - SUBJECTIVE AND OBJECTIVE BOX
SUBJECTIVE / OVERNIGHT EVENTS: pt seen and examined    MEDICATIONS  (STANDING):  dextrose 5%. 1000 milliLiter(s) (50 mL/Hr) IV Continuous <Continuous>  dextrose 50% Injectable 12.5 Gram(s) IV Push once  dextrose 50% Injectable 25 Gram(s) IV Push once  dextrose 50% Injectable 25 Gram(s) IV Push once  ferrous    sulfate 325 milliGRAM(s) Oral daily  folic acid 1 milliGRAM(s) Oral daily  furosemide    Tablet 20 milliGRAM(s) Oral daily  insulin glargine Injectable (LANTUS) 24 Unit(s) SubCutaneous at bedtime  insulin lispro (HumaLOG) corrective regimen sliding scale   SubCutaneous three times a day before meals  insulin lispro (HumaLOG) corrective regimen sliding scale   SubCutaneous at bedtime  insulin lispro Injectable (HumaLOG) 8 Unit(s) SubCutaneous three times a day before meals  lactulose Syrup 20 Gram(s) Oral three times a day  meropenem  IVPB      meropenem  IVPB 1000 milliGRAM(s) IV Intermittent every 8 hours  midodrine. 20 milliGRAM(s) Oral three times a day  pantoprazole    Tablet 40 milliGRAM(s) Oral two times a day  rifAXIMin 550 milliGRAM(s) Oral two times a day  spironolactone 50 milliGRAM(s) Oral daily  tamsulosin 0.4 milliGRAM(s) Oral at bedtime  vancomycin  IVPB        MEDICATIONS  (PRN):  dextrose 40% Gel 15 Gram(s) Oral once PRN Blood Glucose LESS THAN 70 milliGRAM(s)/deciliter  glucagon  Injectable 1 milliGRAM(s) IntraMuscular once PRN Glucose LESS THAN 70 milligrams/deciliter    Vital Signs Last 24 Hrs  T(C): 37.2 (2020 21:11), Max: 37.6 (2020 01:00)  T(F): 99 (2020 21:11), Max: 99.7 (2020 01:00)  HR: 90 (2020 21:11) (85 - 90)  BP: 107/65 (2020 21:11) (101/58 - 119/65)  BP(mean): --  RR: 18 (2020 21:11) (18 - 18)  SpO2: 96% (2020 21:11) (95% - 97%)    Constitutional: No fever, fatigue  Skin: No rash.  Eyes: No recent vision problems or eye pain.  ENT: No congestion, ear pain, or sore throat.  Cardiovascular: No chest pain or palpation.  Respiratory: No cough, shortness of breath, congestion, or wheezing.  Gastrointestinal: No abdominal pain, nausea, vomiting, or diarrhea.  Genitourinary: No dysuria.  Musculoskeletal: No joint swelling.  Neurologic: No headache.    PHYSICAL EXAM:  GENERAL: NAD  EYES: EOMI, PERRLA  NECK: Supple, No JVD  CHEST/LUNG: dec breath sounds rt base  HEART:  S1 , S2 +  ABDOMEN: soft , bs+, mild distension+  EXTREMITIES: left > rt edema+  NEUROLOGY: alert awake oriented x place, person    LABS:      129<L>  |  97  |  16  ----------------------------<  128<H>  3.7   |  22  |  1.12    Ca    8.5      2020 06:45    TPro  5.5<L>  /  Alb  2.5<L>  /  TBili  11.4<H>  /  DBili      /  AST  23  /  ALT  13  /  AlkPhos  111  -    Creatinine Trend: 1.12 <--, 1.06 <--, 1.07 <--, 1.17 <--, 1.13 <--, 1.11 <--, 1.04 <--                        8.7    17.16 )-----------( 78       ( 2020 08:19 )             25.7     Urine Studies:  Urinalysis Basic - ( 2020 07:26 )    Color: Dark Yellow / Appearance: Slightly Turbid / S.025 / pH:   Gluc:  / Ketone: Trace  / Bili: Small / Urobili: 6 mg/dL   Blood:  / Protein: 30 mg/dL / Nitrite: Negative   Leuk Esterase: Large / RBC: 8 /hpf /  /HPF   Sq Epi:  / Non Sq Epi: 0 /hpf / Bacteria: Many              LIVER FUNCTIONS - ( 2020 06:45 )  Alb: 2.5 g/dL / Pro: 5.5 g/dL / ALK PHOS: 111 U/L / ALT: 13 U/L / AST: 23 U/L / GGT: x           PT/INR - ( 2020 08:10 )   PT: 25.2 sec;   INR: 2.16 ratio

## 2020-01-06 NOTE — PROGRESS NOTE ADULT - SUBJECTIVE AND OBJECTIVE BOX
Chief Complaint: Left thigh swelling    Interval Events:     Allergies:  codeine (Anaphylaxis)  NO  RED MEAT (Unknown)    Hospital Medications:  dextrose 40% Gel 15 Gram(s) Oral once PRN  dextrose 5%. 1000 milliLiter(s) IV Continuous <Continuous>  dextrose 50% Injectable 12.5 Gram(s) IV Push once  dextrose 50% Injectable 25 Gram(s) IV Push once  dextrose 50% Injectable 25 Gram(s) IV Push once  ferrous    sulfate 325 milliGRAM(s) Oral daily  folic acid 1 milliGRAM(s) Oral daily  furosemide    Tablet 20 milliGRAM(s) Oral daily  glucagon  Injectable 1 milliGRAM(s) IntraMuscular once PRN  insulin glargine Injectable (LANTUS) 24 Unit(s) SubCutaneous at bedtime  insulin lispro (HumaLOG) corrective regimen sliding scale   SubCutaneous three times a day before meals  insulin lispro (HumaLOG) corrective regimen sliding scale   SubCutaneous at bedtime  insulin lispro Injectable (HumaLOG) 8 Unit(s) SubCutaneous three times a day before meals  lactulose Syrup 20 Gram(s) Oral three times a day  midodrine. 20 milliGRAM(s) Oral three times a day  pantoprazole    Tablet 40 milliGRAM(s) Oral two times a day  rifAXIMin 550 milliGRAM(s) Oral two times a day  spironolactone 50 milliGRAM(s) Oral daily  tamsulosin 0.4 milliGRAM(s) Oral at bedtime    PMHX/PSHX:  GIB (gastrointestinal bleeding)  GERD with esophagitis  Hepatic encephalopathy  Obesity  Fatty liver disease, nonalcoholic  Renal stones  Hypertension  Neuropathy  Hypercholesteremia  Diabetes  S/P cholecystectomy    Family history:  Family history of type 2 diabetes mellitus  Family history of hypertension  Family history of stomach cancer    ROS:     General:  No wt loss, fevers, chills, night sweats, fatigue,   Eyes:  Good vision, no reported pain  ENT:  No sore throat, pain, runny nose, dysphagia  CV:  No pain, palpitations, hypo/hypertension  Pulm:  No dyspnea, cough, tachypnea, wheezing  GI:  No pain, No nausea, No vomiting, No diarrhea, No constipation, No weight loss, No fever, No pruritis, No rectal bleeding, No tarry stools, No dysphagia  :  No pain, bleeding, incontinence, nocturia  Muscle:  No pain, weakness  Neuro:  No weakness, tingling, memory problems  Psych:  No fatigue, insomnia, mood problems, depression  Endocrine:  No polyuria, polydipsia, cold/heat intolerance  Heme:  No petechiae, ecchymosis, easy bruisability  Skin:  No rash, tattoos, scars, edema    PHYSICAL EXAM:   Vital Signs:  Vital Signs Last 24 Hrs  T(C): 37 (2020 05:06), Max: 37.9 (2020 21:29)  T(F): 98.6 (2020 05:06), Max: 100.2 (2020 21:29)  HR: 85 (2020 05:06) (85 - 92)  BP: 101/58 (2020 05:06) (101/56 - 111/61)  BP(mean): --  RR: 18 (2020 05:06) (18 - 18)  SpO2: 95% (2020 05:06) (95% - 96%)  Daily Weight in k (2020 05:38)    GENERAL:  No acute distress  HEENT:  Normocephalic/atraumtic,  no scleral icterus  CHEST:  Clear to auscultation bilaterally, no wheezes/rales/ronchi, no accessory muscle use  HEART:  Regular rate and rhythm, no murmurs/rubs/gallops  ABDOMEN:  Soft, non-tender, non-distended, normoactive bowel sounds, no masses, no hepato-splenomegaly, no signs of chronic liver disease  EXTREMITIES:  No cyanosis, clubbing, or edema  SKIN:  No rash/erythema/ecchymoses/petechiae/wounds/abscess/warm/dry  NEURO:  Alert and oriented x 3, no asterixis, no tremor    LABS:                        7.8    12.25 )-----------( 79       ( 2020 07:26 )             24.1       01-06    129<L>  |  97  |  16  ----------------------------<  128<H>  3.7   |  22  |  1.12    Ca    8.5      2020 06:45    TPro  5.5<L>  /  Alb  2.5<L>  /  TBili  11.4<H>  /  DBili  x   /  AST  23  /  ALT  13  /  AlkPhos  111  01-06    LIVER FUNCTIONS - ( 2020 06:45 )  Alb: 2.5 g/dL / Pro: 5.5 g/dL / ALK PHOS: 111 U/L / ALT: 13 U/L / AST: 23 U/L / GGT: x           PT/INR - ( 2020 07:26 )   PT: 24.4 sec;   INR: 2.07 ratio      Urinalysis Basic - ( 2020 07:26 )    Color: Dark Yellow / Appearance: Slightly Turbid / S.025 / pH: x  Gluc: x / Ketone: Trace  / Bili: Small / Urobili: 6 mg/dL   Blood: x / Protein: 30 mg/dL / Nitrite: Negative   Leuk Esterase: Large / RBC: 8 /hpf /  /HPF   Sq Epi: x / Non Sq Epi: 0 /hpf / Bacteria: Many               7.8    12.25 )-----------( 79       ( 2020 07:26 )             24.1                         7.5    6.70  )-----------( 73       ( 2020 06:41 )             23.4                         7.5    6.64  )-----------( 73       ( 2020 08:35 )             22.7       Imaging:    < from: CT Angio Lower Extremity w/ IV Cont, Left (20 @ 17:06) >    EXAM:  CT ANGIO LWR EXT (W)AW IC LT                          PROCEDURE DATE:  2020      INTERPRETATION:  CLINICAL INFORMATION: Left lower extremity swelling with known left thigh hematoma. Evaluate for active bleeding/vascular pathology.    COMPARISON: CT left lower extremity 2019    CT ANGIOGRAM ABDOMEN, PELVIS, AND LOWER EXTREMITIES:    PROCEDURE:   Initially, nonenhanced CT was obtained through the left lower extremity. Then, following the rapid administration of intravenous contrast, CT angiography was performed through the left lower extremity followed by delayed imaging. Sagittal and coronal reformats as well as 3D reconstructions were performed.    125 mls of Omnipaque 350 was administered intravenously without complication and 25 mls were discarded.    FINDINGS:    A 5.7 x 4.5 x 14.4 cm hematoma within the left vastus intermedialis, previously 7.1 x 4.5 x 14.2 cm remeasured at prior imaging. No evidence of active bleeding.     Left external iliac, common femoral, and femoral arteries are widely patent without evidence of significant stenosis. Diffuse atherosclerotic changes are present. The popliteal artery is patent. There is three-vessel runoff to the ankle although evaluation of the distal calf vesselsis limited secondary to extensive calcification.    Small left knee joint effusion. Diffuse lower extremity subcutaneous edema.    IMPRESSION:     Hematoma within the left vastus intermedialis musculature. No evidence of active bleeding.    No evidence of a significant left lower extremity arterial stenosis or occlusion.    Left knee joint effusion.     Diffuse lower extremity subcutaneous edema.    RICHARD PUCKETT M.D., ATTENDING RADIOLOGIST  This document has been electronically signed. 2020 10:14AM     < end of copied text > Chief Complaint: Left thigh swelling    Interval Events:   - The patient has no new complaints this morning  - He denies left thigh and leg pain    Allergies:  codeine (Anaphylaxis)  NO  RED MEAT (Unknown)    Hospital Medications:  dextrose 40% Gel 15 Gram(s) Oral once PRN  dextrose 5%. 1000 milliLiter(s) IV Continuous <Continuous>  dextrose 50% Injectable 12.5 Gram(s) IV Push once  dextrose 50% Injectable 25 Gram(s) IV Push once  dextrose 50% Injectable 25 Gram(s) IV Push once  ferrous    sulfate 325 milliGRAM(s) Oral daily  folic acid 1 milliGRAM(s) Oral daily  furosemide    Tablet 20 milliGRAM(s) Oral daily  glucagon  Injectable 1 milliGRAM(s) IntraMuscular once PRN  insulin glargine Injectable (LANTUS) 24 Unit(s) SubCutaneous at bedtime  insulin lispro (HumaLOG) corrective regimen sliding scale   SubCutaneous three times a day before meals  insulin lispro (HumaLOG) corrective regimen sliding scale   SubCutaneous at bedtime  insulin lispro Injectable (HumaLOG) 8 Unit(s) SubCutaneous three times a day before meals  lactulose Syrup 20 Gram(s) Oral three times a day  midodrine. 20 milliGRAM(s) Oral three times a day  pantoprazole    Tablet 40 milliGRAM(s) Oral two times a day  rifAXIMin 550 milliGRAM(s) Oral two times a day  spironolactone 50 milliGRAM(s) Oral daily  tamsulosin 0.4 milliGRAM(s) Oral at bedtime    PMHX/PSHX:  GIB (gastrointestinal bleeding)  GERD with esophagitis  Hepatic encephalopathy  Obesity  Fatty liver disease, nonalcoholic  Renal stones  Hypertension  Neuropathy  Hypercholesteremia  Diabetes  S/P cholecystectomy    Family history:  Family history of type 2 diabetes mellitus  Family history of hypertension  Family history of stomach cancer    ROS:     General:  No wt loss, fevers, chills, night sweats, fatigue,   Eyes:  Good vision, no reported pain  ENT:  No sore throat, pain, runny nose, dysphagia  CV:  No pain, palpitations, hypo/hypertension  Pulm:  No dyspnea, cough, tachypnea, wheezing  GI:  No pain, No nausea, No vomiting, No diarrhea, No constipation, No weight loss, No fever, No pruritis, No rectal bleeding, No tarry stools, No dysphagia  :  No pain, bleeding, incontinence, nocturia  Muscle:  No pain, weakness  Neuro:  No weakness, tingling, memory problems  Psych:  No fatigue, insomnia, mood problems, depression  Endocrine:  No polyuria, polydipsia, cold/heat intolerance  Heme:  No petechiae, ecchymosis, easy bruisability  Skin:  No rash, tattoos, scars, edema    PHYSICAL EXAM:   Vital Signs:  Vital Signs Last 24 Hrs  T(C): 37 (2020 05:06), Max: 37.9 (2020 21:29)  T(F): 98.6 (2020 05:06), Max: 100.2 (2020 21:29)  HR: 85 (2020 05:06) (85 - 92)  BP: 101/58 (2020 05:06) (101/56 - 111/61)  BP(mean): --  RR: 18 (2020 05:06) (18 - 18)  SpO2: 95% (2020 05:06) (95% - 96%)  Daily Weight in k (2020 05:38)    GENERAL:  No acute distress  HEENT:  Normocephalic/atraumatic,  no scleral icterus  CHEST:  Normal effort, no accessory muscle use  ABDOMEN:  Soft, non-tender, non-distended, normoactive bowel sounds  EXTREMITIES:  No cyanosis, clubbing, or edema  SKIN:  No rash/erythema  NEURO:  Alert and oriented x 3, + asterixis    LABS:                        7.8    12.25 )-----------( 79       ( 2020 07:26 )             24.1       01-06    129<L>  |  97  |  16  ----------------------------<  128<H>  3.7   |  22  |  1.12    Ca    8.5      2020 06:45    TPro  5.5<L>  /  Alb  2.5<L>  /  TBili  11.4<H>  /  DBili  x   /  AST  23  /  ALT  13  /  AlkPhos  111  01-06    LIVER FUNCTIONS - ( 2020 06:45 )  Alb: 2.5 g/dL / Pro: 5.5 g/dL / ALK PHOS: 111 U/L / ALT: 13 U/L / AST: 23 U/L / GGT: x           PT/INR - ( 2020 07:26 )   PT: 24.4 sec;   INR: 2.07 ratio      Urinalysis Basic - ( 2020 07:26 )    Color: Dark Yellow / Appearance: Slightly Turbid / S.025 / pH: x  Gluc: x / Ketone: Trace  / Bili: Small / Urobili: 6 mg/dL   Blood: x / Protein: 30 mg/dL / Nitrite: Negative   Leuk Esterase: Large / RBC: 8 /hpf /  /HPF   Sq Epi: x / Non Sq Epi: 0 /hpf / Bacteria: Many               7.8    12.25 )-----------( 79       ( 2020 07:26 )             24.1                         7.5    6.70  )-----------( 73       ( 2020 06:41 )             23.4                         7.5    6.64  )-----------( 73       ( 2020 08:35 )             22.7       Imaging:    < from: CT Angio Lower Extremity w/ IV Cont, Left (20 @ 17:06) >    EXAM:  CT ANGIO LWR EXT (W)AW IC LT                          PROCEDURE DATE:  2020      INTERPRETATION:  CLINICAL INFORMATION: Left lower extremity swelling with known left thigh hematoma. Evaluate for active bleeding/vascular pathology.    COMPARISON: CT left lower extremity 2019    CT ANGIOGRAM ABDOMEN, PELVIS, AND LOWER EXTREMITIES:    PROCEDURE:   Initially, nonenhanced CT was obtained through the left lower extremity. Then, following the rapid administration of intravenous contrast, CT angiography was performed through the left lower extremity followed by delayed imaging. Sagittal and coronal reformats as well as 3D reconstructions were performed.    125 mls of Omnipaque 350 was administered intravenously without complication and 25 mls were discarded.    FINDINGS:    A 5.7 x 4.5 x 14.4 cm hematoma within the left vastus intermedialis, previously 7.1 x 4.5 x 14.2 cm remeasured at prior imaging. No evidence of active bleeding.     Left external iliac, common femoral, and femoral arteries are widely patent without evidence of significant stenosis. Diffuse atherosclerotic changes are present. The popliteal artery is patent. There is three-vessel runoff to the ankle although evaluation of the distal calf vesselsis limited secondary to extensive calcification.    Small left knee joint effusion. Diffuse lower extremity subcutaneous edema.    IMPRESSION:     Hematoma within the left vastus intermedialis musculature. No evidence of active bleeding.    No evidence of a significant left lower extremity arterial stenosis or occlusion.    Left knee joint effusion.     Diffuse lower extremity subcutaneous edema.    RICHARD PUCKETT M.D., ATTENDING RADIOLOGIST  This document has been electronically signed. 2020 10:14AM     < end of copied text >

## 2020-01-07 LAB
-  AMIKACIN: SIGNIFICANT CHANGE UP
-  AMPICILLIN/SULBACTAM: SIGNIFICANT CHANGE UP
-  AMPICILLIN: SIGNIFICANT CHANGE UP
-  AZTREONAM: SIGNIFICANT CHANGE UP
-  CEFAZOLIN: SIGNIFICANT CHANGE UP
-  CEFEPIME: SIGNIFICANT CHANGE UP
-  CEFOXITIN: SIGNIFICANT CHANGE UP
-  CEFTRIAXONE: SIGNIFICANT CHANGE UP
-  CIPROFLOXACIN: SIGNIFICANT CHANGE UP
-  ERTAPENEM: SIGNIFICANT CHANGE UP
-  GENTAMICIN: SIGNIFICANT CHANGE UP
-  IMIPENEM: SIGNIFICANT CHANGE UP
-  LEVOFLOXACIN: SIGNIFICANT CHANGE UP
-  MEROPENEM: SIGNIFICANT CHANGE UP
-  NITROFURANTOIN: SIGNIFICANT CHANGE UP
-  PIPERACILLIN/TAZOBACTAM: SIGNIFICANT CHANGE UP
-  TIGECYCLINE: SIGNIFICANT CHANGE UP
-  TOBRAMYCIN: SIGNIFICANT CHANGE UP
-  TRIMETHOPRIM/SULFAMETHOXAZOLE: SIGNIFICANT CHANGE UP
ALBUMIN SERPL ELPH-MCNC: 2.3 G/DL — LOW (ref 3.3–5)
ALP SERPL-CCNC: 125 U/L — HIGH (ref 40–120)
ALT FLD-CCNC: 11 U/L — SIGNIFICANT CHANGE UP (ref 10–45)
ANION GAP SERPL CALC-SCNC: 9 MMOL/L — SIGNIFICANT CHANGE UP (ref 5–17)
AST SERPL-CCNC: 21 U/L — SIGNIFICANT CHANGE UP (ref 10–40)
BILIRUB SERPL-MCNC: 9.8 MG/DL — HIGH (ref 0.2–1.2)
BUN SERPL-MCNC: 18 MG/DL — SIGNIFICANT CHANGE UP (ref 7–23)
CALCIUM SERPL-MCNC: 8.3 MG/DL — LOW (ref 8.4–10.5)
CHLORIDE SERPL-SCNC: 98 MMOL/L — SIGNIFICANT CHANGE UP (ref 96–108)
CO2 SERPL-SCNC: 24 MMOL/L — SIGNIFICANT CHANGE UP (ref 22–31)
CREAT SERPL-MCNC: 1.16 MG/DL — SIGNIFICANT CHANGE UP (ref 0.5–1.3)
CULTURE RESULTS: SIGNIFICANT CHANGE UP
GLUCOSE BLDC GLUCOMTR-MCNC: 125 MG/DL — HIGH (ref 70–99)
GLUCOSE BLDC GLUCOMTR-MCNC: 194 MG/DL — HIGH (ref 70–99)
GLUCOSE BLDC GLUCOMTR-MCNC: 199 MG/DL — HIGH (ref 70–99)
GLUCOSE BLDC GLUCOMTR-MCNC: 247 MG/DL — HIGH (ref 70–99)
GLUCOSE SERPL-MCNC: 140 MG/DL — HIGH (ref 70–99)
HCT VFR BLD CALC: 23.4 % — LOW (ref 39–50)
HGB BLD-MCNC: 8 G/DL — LOW (ref 13–17)
INR BLD: 2.01 RATIO — HIGH (ref 0.88–1.16)
MCHC RBC-ENTMCNC: 34.2 GM/DL — SIGNIFICANT CHANGE UP (ref 32–36)
MCHC RBC-ENTMCNC: 35.2 PG — HIGH (ref 27–34)
MCV RBC AUTO: 103.1 FL — HIGH (ref 80–100)
METHOD TYPE: SIGNIFICANT CHANGE UP
ORGANISM # SPEC MICROSCOPIC CNT: SIGNIFICANT CHANGE UP
ORGANISM # SPEC MICROSCOPIC CNT: SIGNIFICANT CHANGE UP
PLATELET # BLD AUTO: 81 K/UL — LOW (ref 150–400)
POTASSIUM SERPL-MCNC: 3.4 MMOL/L — LOW (ref 3.5–5.3)
POTASSIUM SERPL-SCNC: 3.4 MMOL/L — LOW (ref 3.5–5.3)
PROT SERPL-MCNC: 5.4 G/DL — LOW (ref 6–8.3)
PROTHROM AB SERPL-ACNC: 23.2 SEC — HIGH (ref 10–13.1)
RBC # BLD: 2.27 M/UL — LOW (ref 4.2–5.8)
RBC # FLD: 24 % — HIGH (ref 10.3–14.5)
SODIUM SERPL-SCNC: 131 MMOL/L — LOW (ref 135–145)
SPECIMEN SOURCE: SIGNIFICANT CHANGE UP
WBC # BLD: 12.39 K/UL — HIGH (ref 3.8–10.5)
WBC # FLD AUTO: 12.39 K/UL — HIGH (ref 3.8–10.5)

## 2020-01-07 PROCEDURE — 99232 SBSQ HOSP IP/OBS MODERATE 35: CPT

## 2020-01-07 PROCEDURE — 99232 SBSQ HOSP IP/OBS MODERATE 35: CPT | Mod: GC

## 2020-01-07 RX ORDER — FUROSEMIDE 40 MG
40 TABLET ORAL DAILY
Refills: 0 | Status: DISCONTINUED | OUTPATIENT
Start: 2020-01-07 | End: 2020-01-10

## 2020-01-07 RX ORDER — POTASSIUM CHLORIDE 20 MEQ
40 PACKET (EA) ORAL ONCE
Refills: 0 | Status: COMPLETED | OUTPATIENT
Start: 2020-01-07 | End: 2020-01-07

## 2020-01-07 RX ADMIN — MEROPENEM 100 MILLIGRAM(S): 1 INJECTION INTRAVENOUS at 13:07

## 2020-01-07 RX ADMIN — Medication 1: at 18:00

## 2020-01-07 RX ADMIN — PANTOPRAZOLE SODIUM 40 MILLIGRAM(S): 20 TABLET, DELAYED RELEASE ORAL at 17:39

## 2020-01-07 RX ADMIN — Medication 1 MILLIGRAM(S): at 11:58

## 2020-01-07 RX ADMIN — Medication 8 UNIT(S): at 08:44

## 2020-01-07 RX ADMIN — Medication 325 MILLIGRAM(S): at 11:58

## 2020-01-07 RX ADMIN — MIDODRINE HYDROCHLORIDE 20 MILLIGRAM(S): 2.5 TABLET ORAL at 17:39

## 2020-01-07 RX ADMIN — Medication 8 UNIT(S): at 18:00

## 2020-01-07 RX ADMIN — INSULIN GLARGINE 24 UNIT(S): 100 INJECTION, SOLUTION SUBCUTANEOUS at 21:59

## 2020-01-07 RX ADMIN — Medication 1: at 12:55

## 2020-01-07 RX ADMIN — SPIRONOLACTONE 50 MILLIGRAM(S): 25 TABLET, FILM COATED ORAL at 05:10

## 2020-01-07 RX ADMIN — MIDODRINE HYDROCHLORIDE 20 MILLIGRAM(S): 2.5 TABLET ORAL at 11:58

## 2020-01-07 RX ADMIN — MEROPENEM 100 MILLIGRAM(S): 1 INJECTION INTRAVENOUS at 06:34

## 2020-01-07 RX ADMIN — PANTOPRAZOLE SODIUM 40 MILLIGRAM(S): 20 TABLET, DELAYED RELEASE ORAL at 05:10

## 2020-01-07 RX ADMIN — MIDODRINE HYDROCHLORIDE 20 MILLIGRAM(S): 2.5 TABLET ORAL at 05:10

## 2020-01-07 RX ADMIN — Medication 20 MILLIGRAM(S): at 05:10

## 2020-01-07 RX ADMIN — Medication 166.67 MILLIGRAM(S): at 05:06

## 2020-01-07 RX ADMIN — LACTULOSE 20 GRAM(S): 10 SOLUTION ORAL at 13:07

## 2020-01-07 RX ADMIN — LACTULOSE 20 GRAM(S): 10 SOLUTION ORAL at 21:18

## 2020-01-07 RX ADMIN — Medication 40 MILLIEQUIVALENT(S): at 17:40

## 2020-01-07 RX ADMIN — Medication 8 UNIT(S): at 12:55

## 2020-01-07 RX ADMIN — LACTULOSE 20 GRAM(S): 10 SOLUTION ORAL at 05:10

## 2020-01-07 RX ADMIN — TAMSULOSIN HYDROCHLORIDE 0.4 MILLIGRAM(S): 0.4 CAPSULE ORAL at 21:18

## 2020-01-07 RX ADMIN — MEROPENEM 100 MILLIGRAM(S): 1 INJECTION INTRAVENOUS at 21:18

## 2020-01-07 NOTE — PROVIDER CONTACT NOTE (OTHER) - BACKGROUND
history of GIB-- gerd--hepatic encephalopathy--obesity--fatty liver disease--renal stones--HTN--neuropathy --diabetes

## 2020-01-07 NOTE — PROGRESS NOTE ADULT - SUBJECTIVE AND OBJECTIVE BOX
[facility] - Sushila Dhaliwal, Doug, Morenita, Ayo, Colleen Williamson  Office Number:  [phone]    Patient resting comfortably in bed in NAD.  Laying flat with no respiratory distress.  No complaints of chest pain, dyspnea, palpitations, PND, or orthopnea.    F/U for:  CHF      MEDICATIONS  (STANDING):  dextrose 5%. 1000 milliLiter(s) (50 mL/Hr) IV Continuous <Continuous>  dextrose 50% Injectable 12.5 Gram(s) IV Push once  dextrose 50% Injectable 25 Gram(s) IV Push once  dextrose 50% Injectable 25 Gram(s) IV Push once  ferrous    sulfate 325 milliGRAM(s) Oral daily  folic acid 1 milliGRAM(s) Oral daily  furosemide    Tablet 20 milliGRAM(s) Oral daily  insulin glargine Injectable (LANTUS) 24 Unit(s) SubCutaneous at bedtime  insulin lispro (HumaLOG) corrective regimen sliding scale   SubCutaneous three times a day before meals  insulin lispro (HumaLOG) corrective regimen sliding scale   SubCutaneous at bedtime  insulin lispro Injectable (HumaLOG) 8 Unit(s) SubCutaneous three times a day before meals  lactulose Syrup 20 Gram(s) Oral three times a day  meropenem  IVPB      meropenem  IVPB 1000 milliGRAM(s) IV Intermittent every 8 hours  midodrine. 20 milliGRAM(s) Oral three times a day  pantoprazole    Tablet 40 milliGRAM(s) Oral two times a day  rifAXIMin 550 milliGRAM(s) Oral two times a day  spironolactone 50 milliGRAM(s) Oral daily  tamsulosin 0.4 milliGRAM(s) Oral at bedtime  vancomycin  IVPB 1250 milliGRAM(s) IV Intermittent every 12 hours  vancomycin  IVPB        MEDICATIONS  (PRN):  dextrose 40% Gel 15 Gram(s) Oral once PRN Blood Glucose LESS THAN 70 milliGRAM(s)/deciliter  glucagon  Injectable 1 milliGRAM(s) IntraMuscular once PRN Glucose LESS THAN 70 milligrams/deciliter      Allergies    codeine (Anaphylaxis)    Intolerances    NO  RED MEAT (Unknown)      Vital Signs Last 24 Hrs  T(C): 37.1 (07 Jan 2020 04:55), Max: 37.6 (06 Jan 2020 12:05)  T(F): 98.7 (07 Jan 2020 04:55), Max: 99.6 (06 Jan 2020 12:05)  HR: 82 (07 Jan 2020 04:55) (82 - 90)  BP: 106/62 (07 Jan 2020 04:55) (106/62 - 119/65)  BP(mean): --  RR: 18 (07 Jan 2020 04:55) (18 - 18)  SpO2: 93% (07 Jan 2020 04:55) (93% - 97%)    I&O's Summary    06 Jan 2020 07:01  -  07 Jan 2020 07:00  --------------------------------------------------------  IN: 240 mL / OUT: 750 mL / NET: -510 mL        ON EXAM:    Constitutional: NAD, awake    HEENT: Moist Mucous Membranes, Anicteric  Pulmonary: Decreased breath sounds b/l. No rales, crackles or wheeze appreciated.   Cardiovascular: Regular, S1 and S2, No murmurs, rubs, gallops or clicks  Gastrointestinal: Bowel Sounds present, soft, nontender.   Lymph: L>R edema in lower ext  Skin: No visible rashes or ulcers.  Psych:  Mood & affect appropriate for situation    LABS: All Labs Reviewed:                        8.7    17.16 )-----------( 78       ( 06 Jan 2020 08:19 )             25.7                         7.8    12.25 )-----------( 79       ( 05 Jan 2020 07:26 )             24.1     07 Jan 2020 07:13    131    |  98     |  18     ----------------------------<  140    3.4     |  24     |  1.16   06 Jan 2020 06:45    129    |  97     |  16     ----------------------------<  128    3.7     |  22     |  1.12   05 Jan 2020 07:26    132    |  99     |  14     ----------------------------<  113    3.6     |  24     |  1.06     Ca    8.3        07 Jan 2020 07:13  Ca    8.5        06 Jan 2020 06:45  Ca    8.4        05 Jan 2020 07:26    TPro  5.4    /  Alb  2.3    /  TBili  9.8    /  DBili  x      /  AST  21     /  ALT  11     /  AlkPhos  125    07 Jan 2020 07:13  TPro  5.5    /  Alb  2.5    /  TBili  11.4   /  DBili  x      /  AST  23     /  ALT  13     /  AlkPhos  111    06 Jan 2020 06:45  TPro  5.2    /  Alb  2.5    /  TBili  9.9    /  DBili  x      /  AST  26     /  ALT  14     /  AlkPhos  115    05 Jan 2020 07:26    PT/INR - ( 06 Jan 2020 08:10 )   PT: 25.2 sec;   INR: 2.16 ratio               Blood Culture: Organism --  Gram Stain Blood -- Gram Stain --  Specimen Source .Urine Clean Catch (Midstream)  Culture-Blood --    Organism --  Gram Stain Blood -- Gram Stain --  Specimen Source .Blood Blood-Peripheral  Culture-Blood --

## 2020-01-07 NOTE — PROGRESS NOTE ADULT - ASSESSMENT
Mr. Segundo is a 65 y/o male with a PMH of GERD w/ esophagitis, T2DM on insulin, HLD, non-alcoholic cirrhosis w/ hepatic encephalopathy and SBP, HFpEF, GIB, and chronic thrombocytopenia, admitted for recurrent anemia below Hgb of 7 requiring transfusion. Referred To Oculoplastics For Closure Text (Leave Blank If You Do Not Want): After obtaining clear surgical margins the patient was sent to oculoplastics for surgical repair.  The patient understands they will receive post-surgical care and follow-up from the referring physician's office.

## 2020-01-07 NOTE — PROGRESS NOTE ADULT - SUBJECTIVE AND OBJECTIVE BOX
SUBJECTIVE / OVERNIGHT EVENTS: pt seen and examined    MEDICATIONS  (STANDING):  dextrose 5%. 1000 milliLiter(s) (50 mL/Hr) IV Continuous <Continuous>  dextrose 50% Injectable 12.5 Gram(s) IV Push once  dextrose 50% Injectable 25 Gram(s) IV Push once  dextrose 50% Injectable 25 Gram(s) IV Push once  ferrous    sulfate 325 milliGRAM(s) Oral daily  folic acid 1 milliGRAM(s) Oral daily  furosemide    Tablet 40 milliGRAM(s) Oral daily  insulin glargine Injectable (LANTUS) 24 Unit(s) SubCutaneous at bedtime  insulin lispro (HumaLOG) corrective regimen sliding scale   SubCutaneous three times a day before meals  insulin lispro (HumaLOG) corrective regimen sliding scale   SubCutaneous at bedtime  insulin lispro Injectable (HumaLOG) 8 Unit(s) SubCutaneous three times a day before meals  lactulose Syrup 20 Gram(s) Oral three times a day  meropenem  IVPB      meropenem  IVPB 1000 milliGRAM(s) IV Intermittent every 8 hours  midodrine. 20 milliGRAM(s) Oral three times a day  pantoprazole    Tablet 40 milliGRAM(s) Oral two times a day  rifAXIMin 550 milliGRAM(s) Oral two times a day  spironolactone 50 milliGRAM(s) Oral daily  tamsulosin 0.4 milliGRAM(s) Oral at bedtime    MEDICATIONS  (PRN):  dextrose 40% Gel 15 Gram(s) Oral once PRN Blood Glucose LESS THAN 70 milliGRAM(s)/deciliter  glucagon  Injectable 1 milliGRAM(s) IntraMuscular once PRN Glucose LESS THAN 70 milligrams/deciliter    Vital Signs Last 24 Hrs  T(C): 37.1 (2020 21:15), Max: 37.1 (2020 04:55)  T(F): 98.8 (2020 21:15), Max: 98.8 (2020 21:15)  HR: 91 (2020 21:15) (82 - 91)  BP: 117/67 (2020 21:15) (106/62 - 117/67)  BP(mean): --  RR: 19 (2020 21:15) (18 - 19)  SpO2: 95% (2020 21:15) (93% - 95%)    Constitutional: No fever, fatigue  Skin: No rash.  Eyes: No recent vision problems or eye pain.  ENT: No congestion, ear pain, or sore throat.  Cardiovascular: No chest pain or palpation.  Respiratory: No cough, shortness of breath, congestion, or wheezing.  Gastrointestinal: No abdominal pain, nausea, vomiting, or diarrhea.  Genitourinary: No dysuria.  Musculoskeletal: No joint swelling.  Neurologic: No headache.    PHYSICAL EXAM:  GENERAL: NAD  EYES: EOMI, PERRLA  NECK: Supple, No JVD  CHEST/LUNG: dec breath sounds rt base  HEART:  S1 , S2 +  ABDOMEN: soft , bs+, mild distension+  EXTREMITIES: left > rt edema+  NEUROLOGY: alert awake oriented x place, person    LABS:      131<L>  |  98  |  18  ----------------------------<  140<H>  3.4<L>   |  24  |  1.16    Ca    8.3<L>      2020 07:13    TPro  5.4<L>  /  Alb  2.3<L>  /  TBili  9.8<H>  /  DBili      /  AST  21  /  ALT  11  /  AlkPhos  125<H>      Creatinine Trend: 1.16 <--, 1.12 <--, 1.06 <--, 1.07 <--, 1.17 <--, 1.13 <--, 1.11 <--                        8.0    12.39 )-----------( 81       ( 2020 09:41 )             23.4     Urine Studies:  Urinalysis Basic - ( 2020 07:26 )    Color: Dark Yellow / Appearance: Slightly Turbid / S.025 / pH:   Gluc:  / Ketone: Trace  / Bili: Small / Urobili: 6 mg/dL   Blood:  / Protein: 30 mg/dL / Nitrite: Negative   Leuk Esterase: Large / RBC: 8 /hpf /  /HPF   Sq Epi:  / Non Sq Epi: 0 /hpf / Bacteria: Many              LIVER FUNCTIONS - ( 2020 07:13 )  Alb: 2.3 g/dL / Pro: 5.4 g/dL / ALK PHOS: 125 U/L / ALT: 11 U/L / AST: 21 U/L / GGT: x           PT/INR - ( 2020 08:53 )   PT: 23.2 sec;   INR: 2.01 ratio                     LIVER FUNCTIONS - ( 2020 06:45 )  Alb: 2.5 g/dL / Pro: 5.5 g/dL / ALK PHOS: 111 U/L / ALT: 13 U/L / AST: 23 U/L / GGT: x           PT/INR - ( 2020 08:10 )   PT: 25.2 sec;   INR: 2.16 ratio

## 2020-01-07 NOTE — PROGRESS NOTE ADULT - SUBJECTIVE AND OBJECTIVE BOX
INTERVAL HPI/OVERNIGHT EVENTS:    no overnight events noted   upon eval earlier this Am, patient tolerating diet.   Denies n/v/d/c abd pain   labs noted; leukocytosis improving on abx     Allergies    codeine (Anaphylaxis)    Intolerances    NO  RED MEAT (Unknown)    General:  No wt loss, fevers, chills, night sweats, fatigue,   Eyes:  Good vision, no reported pain  ENT:  No sore throat, pain, runny nose, dysphagia  CV:  No pain, palpitations, hypo/hypertension  Resp:  No dyspnea, cough, tachypnea, wheezing  GI:  No pain, No nausea, No vomiting, No diarrhea, No constipation, No weight loss, No fever, No pruritis, No rectal bleeding, No tarry stools, No dysphagia,  :  No pain, bleeding, incontinence, nocturia  Muscle:  No pain, weakness  Neuro:  No weakness, tingling, memory problems  Psych:  No fatigue, insomnia, mood problems, depression  Endocrine:  No polyuria, polydipsia, cold/heat intolerance  Heme:  No petechiae, ecchymosis, easy bruisability  Skin:  No rash, tattoos, scars, edema      PHYSICAL EXAM:   Vital Signs:  Vital Signs Last 24 Hrs  T(C): 37 (2020 14:40), Max: 38.2 (2020 20:59)  T(F): 98.6 (2020 14:40), Max: 100.8 (2020 20:59)  HR: 87 (2020 14:40) (84 - 94)  BP: 111/61 (2020 14:40) (111/61 - 138/76)  BP(mean): --  RR: 18 (2020 14:40) (17 - 18)  SpO2: 96% (2020 14:40) (95% - 97%)  Daily     Daily Weight in k.4 (2020 04:46)I&O's Summary    2020 07:01  -  2020 07:00  --------------------------------------------------------  IN: 840 mL / OUT: 2600 mL / NET: -1760 mL    2020 07:01  -  2020 15:26  --------------------------------------------------------  IN: 480 mL / OUT: 0 mL / NET: 480 mL        GENERAL:  lying in bed, NAD   HEENT:  NC/AT  ABDOMEN:  obese, Soft, non-tender, softly distended, normoactive bowel sounds  EXTEREMITIES:  L calf hematoma, LLE edema   NEURO:  Alert, oriented, no asterixis, no tremor, no encephalopathy      LABS:                        7.8    12.25 )-----------( 79       ( 2020 07:26 )             24.1     -    132<L>  |  99  |  14  ----------------------------<  113<H>  3.6   |  24  |  1.06    Ca    8.4      2020 07:26    TPro  5.2<L>  /  Alb  2.5<L>  /  TBili  9.9<H>  /  DBili  x   /  AST  26  /  ALT  14  /  AlkPhos  115  01-05    PT/INR - ( 2020 07:26 )   PT: 24.4 sec;   INR: 2.07 ratio           Urinalysis Basic - ( 2020 07:26 )    Color: Dark Yellow / Appearance: Slightly Turbid / S.025 / pH: x  Gluc: x / Ketone: Trace  / Bili: Small / Urobili: 6 mg/dL   Blood: x / Protein: 30 mg/dL / Nitrite: Negative   Leuk Esterase: Large / RBC: 8 /hpf /  /HPF   Sq Epi: x / Non Sq Epi: 0 /hpf / Bacteria: Many      amylase   lipase  RADIOLOGY & ADDITIONAL TESTS:

## 2020-01-07 NOTE — PROGRESS NOTE ADULT - ASSESSMENT
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. He was re-admitted from subacute rehab on 12/26/19 due to acute on chronic anemia without overt GI bleeding (found to have LLE hematoma), also with hepatic encephalopathy.    # Leukocytosis and low grade fever: Concern for underlying infection, possibly due to infected left thigh hematoma. Transplant ID following and started empiric antibiotics, with antibiotic course pending culture results.  # Left leg swelling and pain      - Large 14cm left thigh hematoma  #Acute on chronic anemia     - acute component likely secondary to thigh hematoma above     - no active bleeding seen on CTA on 1/3/20      - s/p EGD 12/30 with mild GAVE s/p APC treatment  # Decompensated JEAN cirrhosis, MELD-Na 27 on 1/7/20     - varices: grade I <5mm varices on EGD 12/30 not amenable to banding; also mild GAVE s/p APC      - SPE: none currently but waxes and wanes.  On lactulose and Xifaxan     - ascites: trace, not on diuretics     - HCC: none on CT this admission     - Transplant: Blood Type A positive, UNOS listed with updated MELD-Na of 27 last week    Recommendations:  - F/U Transplant ID recommendations  - F/U blood and urine cultures   - daily CBC, CMP, INR  - monitor exam for size of hematoma and s/sx of compartment syndrome  - PPI PO BID for GAVE  - Continue furosemide at 40 mg PO daily  - 1.5L fluid restriction  - Low salt diet  - Continue Glucerna TID with meals given low albumin  - Lactulose TID and Xifaxan BID for HE     Please call Hepatology ([phone]) if there are any additional questions or concerns. Please call on-call GI fellow after 5pm and before 8am, and on weekends.

## 2020-01-07 NOTE — PROGRESS NOTE ADULT - ASSESSMENT
63 y/o M PMHx of IDDMII, HfpEF, decompensated JEAN cirrhosis, hepatic encephalopathy, previous episode of SBP, previous GBS bacteremia and ESBL UTI, was sent from rehab for acute on chronic anemia, HE, and LLE hematoma, s/p 5 day course of CTX now with isolated fever and increasing leukocytosis of unclear significance.    Isolated fever,  leukocytosis  -Ucx with GNR, awaiting speciation, sensitivities. No urinary symptoms  -could leukocytosis be secondary to bleed? Hb appears stable  -hematoma does not appear infected, likely ok to d/c vancomycin, will discuss with Dr Pérez  -can c/w meropenem for UTI, pt with previous ESBL E coli.  -Bcx remain negative    All recommendations pending attending signature 65 y/o M PMHx of IDDMII, HfpEF, decompensated JEAN cirrhosis, hepatic encephalopathy, previous episode of SBP, previous GBS bacteremia and ESBL UTI, was sent from rehab for acute on chronic anemia, HE, and LLE hematoma, s/p 5 day course of CTX now with isolated fever and increasing leukocytosis of unclear significance.    Isolated fever,  leukocytosis  -Ucx with GNR, awaiting speciation, sensitivities. No urinary symptoms  -could leukocytosis be secondary to bleed? Hb appears stable  -hematoma does not appear infected, likely ok to d/c vancomycin, will discuss with Dr Pérez  -can c/w meropenem for UTI, pt with previous ESBL E coli.  -Bcx remain negative

## 2020-01-07 NOTE — PROGRESS NOTE ADULT - SUBJECTIVE AND OBJECTIVE BOX
Follow Up:  Leukocytosis, UTI    Inverval History/ROS:Patient is a 64y old  Male who presents with a chief complaint of low Hgb (07 Jan 2020 15:31)    No acute events overnight. Pt is afebrile with decreasing leukocytosis. Denies fevers, chills, cough, dysuria, abdominal pain.    Allergies    codeine (Anaphylaxis)    Intolerances        ANTIMICROBIALS:  meropenem  IVPB    meropenem  IVPB 1000 every 8 hours  rifAXIMin 550 two times a day      OTHER MEDS:  dextrose 40% Gel 15 Gram(s) Oral once PRN  dextrose 5%. 1000 milliLiter(s) IV Continuous <Continuous>  dextrose 50% Injectable 12.5 Gram(s) IV Push once  dextrose 50% Injectable 25 Gram(s) IV Push once  dextrose 50% Injectable 25 Gram(s) IV Push once  ferrous    sulfate 325 milliGRAM(s) Oral daily  folic acid 1 milliGRAM(s) Oral daily  furosemide    Tablet 20 milliGRAM(s) Oral daily  glucagon  Injectable 1 milliGRAM(s) IntraMuscular once PRN  insulin glargine Injectable (LANTUS) 24 Unit(s) SubCutaneous at bedtime  insulin lispro (HumaLOG) corrective regimen sliding scale   SubCutaneous three times a day before meals  insulin lispro (HumaLOG) corrective regimen sliding scale   SubCutaneous at bedtime  insulin lispro Injectable (HumaLOG) 8 Unit(s) SubCutaneous three times a day before meals  lactulose Syrup 20 Gram(s) Oral three times a day  midodrine. 20 milliGRAM(s) Oral three times a day  pantoprazole    Tablet 40 milliGRAM(s) Oral two times a day  potassium chloride    Tablet ER 40 milliEquivalent(s) Oral once  spironolactone 50 milliGRAM(s) Oral daily  tamsulosin 0.4 milliGRAM(s) Oral at bedtime      Vital Signs Last 24 Hrs  T(C): 36.8 (07 Jan 2020 13:41), Max: 37.2 (06 Jan 2020 21:11)  T(F): 98.3 (07 Jan 2020 13:41), Max: 99 (06 Jan 2020 21:11)  HR: 83 (07 Jan 2020 13:41) (82 - 90)  BP: 108/61 (07 Jan 2020 13:41) (106/62 - 108/61)  BP(mean): --  RR: 18 (07 Jan 2020 13:41) (18 - 18)  SpO2: 93% (07 Jan 2020 13:41) (93% - 96%)    Physical Exam  Gen: Well appearing, NAD, slightly jaundiced  Lungs: CTA B/L, no w/r/r  Heart: RRR, no m/r/g  Abdomen: soft, non-ttp  Ext: mild induration in left thigh, calf, no warmth, erythema, signs of infection  Skin: no rashes                          8.0    12.39 )-----------( 81       ( 07 Jan 2020 09:41 )             23.4       01-07    131<L>  |  98  |  18  ----------------------------<  140<H>  3.4<L>   |  24  |  1.16    Ca    8.3<L>      07 Jan 2020 07:13    TPro  5.4<L>  /  Alb  2.3<L>  /  TBili  9.8<H>  /  DBili  x   /  AST  21  /  ALT  11  /  AlkPhos  125<H>  01-07          MICROBIOLOGY:Culture Results:   >100,000 CFU/ml Gram Negative Rods (01-05 @ 21:18)  Culture Results:   No growth to date. (01-05 @ 02:10)  Culture Results:   No growth to date. (01-05 @ 02:10)      RADIOLOGY:  < from: CT Angio Lower Extremity w/ IV Cont, Left (01.03.20 @ 17:06) >    IMPRESSION:     Hematoma within the left vastus intermedialis musculature. No evidence of active bleeding.    No evidence of a significant left lower extremity arterial stenosis or occlusion.    Left knee joint effusion.     Diffuse lower extremity subcutaneous edema.    < end of copied text >

## 2020-01-07 NOTE — CHART NOTE - NSCHARTNOTEFT_GEN_A_CORE
Nutrition Follow Up Note    Patient seen for: nutrition follow up. Pt is UNOS listed at [facility] for Liver Txp. MELD-Na 27 (20)    Source: patient, medical record    Chart reviewed, events noted. "63 yo M with IDDM, dyslipidemia, obesity, GERD, HFpEF with mild LV diastolic dysfunction, and decompensated JEAN cirrhosis complicated by ascites, history of SBP, hepatic encephalopathy, and chronic anemia with a history of duodenal ulcer as well as GAVE and duodenal AVM s/p APC (last on 10/11/19), who is UNOS listed for liver transplantation at [facility]. He was re-admitted from subacute rehab on 19 due to acute on chronic anemia without overt GI bleeding (found to have LLE hematoma), also with hepatic encephalopathy."    Nutrition Status: Pt noted on December admit with weight loss of ~100 pounds, from fluid overload and diuresis as per Hepatology Transplant Team. Dosing wt 19: 115.5Kg, Dosing wt 19: 122.5Kg (with edema). Current wt: 136Kg (20 with edema). IBW 83.6Kg.  Last paracentesis 2019. Rx for Rifaximin, Lactulose, Lasix, Aldactone, iron sulfate, and folic acid noted.    Diet : Consistent Carbohydrate, Low Sodium diet with snack, with 3 servings Glucerna supplement     PO intake : Pt reports menu fatigue, requests removal of "Red Meat Intolerance" so that he will have meat options on his menu; communicated with NP. Pt reports fatigue with Glucerna supplement but is consuming 1 daily.    Last BM: 1/3, ,     Daily Weight in k (), Weight in k (), Weight in k.4 (), Weight in k.8 (), Weight in k.6 (), Weight in k (), Weight in k ()    Drug Dosing Weight  Weight (kg): 122.5 (26 Dec 2019 17:12)  BMI (kg/m2): 35.6 (26 Dec 2019 17:12)    Pertinent Medications: MEDICATIONS  (STANDING):  dextrose 5%. 1000 milliLiter(s) (50 mL/Hr) IV Continuous <Continuous>  dextrose 50% Injectable 12.5 Gram(s) IV Push once  dextrose 50% Injectable 25 Gram(s) IV Push once  dextrose 50% Injectable 25 Gram(s) IV Push once  ferrous    sulfate 325 milliGRAM(s) Oral daily  folic acid 1 milliGRAM(s) Oral daily  furosemide    Tablet 20 milliGRAM(s) Oral daily  insulin glargine Injectable (LANTUS) 24 Unit(s) SubCutaneous at bedtime  insulin lispro (HumaLOG) corrective regimen sliding scale   SubCutaneous three times a day before meals  insulin lispro (HumaLOG) corrective regimen sliding scale   SubCutaneous at bedtime  insulin lispro Injectable (HumaLOG) 8 Unit(s) SubCutaneous three times a day before meals  lactulose Syrup 20 Gram(s) Oral three times a day  meropenem  IVPB      meropenem  IVPB 1000 milliGRAM(s) IV Intermittent every 8 hours  midodrine. 20 milliGRAM(s) Oral three times a day  pantoprazole    Tablet 40 milliGRAM(s) Oral two times a day  rifAXIMin 550 milliGRAM(s) Oral two times a day  spironolactone 50 milliGRAM(s) Oral daily  tamsulosin 0.4 milliGRAM(s) Oral at bedtime    MEDICATIONS  (PRN):  dextrose 40% Gel 15 Gram(s) Oral once PRN Blood Glucose LESS THAN 70 milliGRAM(s)/deciliter  glucagon  Injectable 1 milliGRAM(s) IntraMuscular once PRN Glucose LESS THAN 70 milligrams/deciliter    LABS:    @ 09:41:  Hemoglobin 8.0<L>, Hematocrit 23.4<L>   @ 07:13: Sodium 131<L>, Potassium 3.4<L>, Chloride 98, Calcium 8.3<L>, Magnesium --, Phosphorus --, BUN 18, Creatinine 1.16, <H>, Alk Phos 125<H>, ALT/SGPT 11, AST/SGOT 21, Total Protein 5.4<L>, Albumin 2.3<L>, Total Bilirubin 9.8<H>,     POCT Blood Glucose.: 199 mg/dL (2020 12:16)  POCT Blood Glucose.: 125 mg/dL (2020 08:26)  POCT Blood Glucose.: 194 mg/dL (2020 21:42)  POCT Blood Glucose.: 168 mg/dL (2020 17:40)    Skin per nursing documentation: no pressure injuries noted  Edema: 1+ left/right ankle, 2+ right leg, 3+ left leg    Estimated Needs:   [X ] no change since previous assessment  [ ] recalculated:     Previous Nutrition Diagnosis: Increased Nutrient Needs  Nutrition Diagnosis is: ongoing, being addressed with therapeutic diet and oral supplements    New Nutrition Diagnosis: none     Interventions:     Recommend  1) Continue Consistent Carbohydrate, Low Sodium diet with snack; food preferences taken to remove inaccurate dietary restrictions/preferences.   2) Continue Glucerna tid; encourage consumption of >1 daily  3) Continue to reinforce therapeutic diet education in-house and prior to discharge.     Monitoring and Evaluation:     Continue to monitor nutritional intake, tolerance to diet prescription, weights, labs, skin integrity.    RD remains available upon request and will follow up per protocol.    Mayh Argueta MS RD CDN [facility], Pager # [phone]

## 2020-01-07 NOTE — PROGRESS NOTE ADULT - SUBJECTIVE AND OBJECTIVE BOX
Chief Complaint: Left thigh swelling    Interval Events:   - The patient denies fevers/chills overnight  - He denies left thigh and leg pain    Allergies:  codeine (Anaphylaxis)  NO  RED MEAT (Unknown)    MEDICATIONS  (STANDING):  dextrose 5%. 1000 milliLiter(s) (50 mL/Hr) IV Continuous <Continuous>  dextrose 50% Injectable 12.5 Gram(s) IV Push once  dextrose 50% Injectable 25 Gram(s) IV Push once  dextrose 50% Injectable 25 Gram(s) IV Push once  ferrous    sulfate 325 milliGRAM(s) Oral daily  folic acid 1 milliGRAM(s) Oral daily  furosemide    Tablet 20 milliGRAM(s) Oral daily  insulin glargine Injectable (LANTUS) 24 Unit(s) SubCutaneous at bedtime  insulin lispro (HumaLOG) corrective regimen sliding scale   SubCutaneous three times a day before meals  insulin lispro (HumaLOG) corrective regimen sliding scale   SubCutaneous at bedtime  insulin lispro Injectable (HumaLOG) 8 Unit(s) SubCutaneous three times a day before meals  lactulose Syrup 20 Gram(s) Oral three times a day  meropenem  IVPB      meropenem  IVPB 1000 milliGRAM(s) IV Intermittent every 8 hours  midodrine. 20 milliGRAM(s) Oral three times a day  pantoprazole    Tablet 40 milliGRAM(s) Oral two times a day  rifAXIMin 550 milliGRAM(s) Oral two times a day  spironolactone 50 milliGRAM(s) Oral daily  tamsulosin 0.4 milliGRAM(s) Oral at bedtime  vancomycin  IVPB 1250 milliGRAM(s) IV Intermittent every 12 hours  vancomycin  IVPB        MEDICATIONS  (PRN):  dextrose 40% Gel 15 Gram(s) Oral once PRN Blood Glucose LESS THAN 70 milliGRAM(s)/deciliter  glucagon  Injectable 1 milliGRAM(s) IntraMuscular once PRN Glucose LESS THAN 70 milligrams/deciliter    PMHX/PSHX:  GIB (gastrointestinal bleeding)  GERD with esophagitis  Hepatic encephalopathy  Obesity  Fatty liver disease, nonalcoholic  Renal stones  Hypertension  Neuropathy  Hypercholesteremia  Diabetes  S/P cholecystectomy    Family history:  Family history of type 2 diabetes mellitus  Family history of hypertension  Family history of stomach cancer    ROS:     General:  No wt loss, fevers, chills, night sweats, fatigue,   Eyes:  Good vision, no reported pain  ENT:  No sore throat, pain, runny nose, dysphagia  CV:  No pain, palpitations, hypo/hypertension  Pulm:  No dyspnea, cough, tachypnea, wheezing  GI:  No pain, No nausea, No vomiting, No diarrhea, No constipation, No weight loss, No fever, No pruritis, No rectal bleeding, No tarry stools, No dysphagia  :  No pain, bleeding, incontinence, nocturia  Muscle:  No pain, weakness  Neuro:  No weakness, tingling, memory problems  Psych:  No fatigue, insomnia, mood problems, depression  Endocrine:  No polyuria, polydipsia, cold/heat intolerance  Heme:  No petechiae, ecchymosis, easy bruisability  Skin:  No rash, tattoos, scars, edema    PHYSICAL EXAM:   Vital Signs:  Vital Signs Last 24 Hrs  T(C): 37.1 (2020 04:55), Max: 37.6 (2020 12:05)  T(F): 98.7 (2020 04:55), Max: 99.6 (2020 12:05)  HR: 82 (2020 04:55) (82 - 90)  BP: 106/62 (2020 04:55) (106/62 - 119/65)  BP(mean): --  RR: 18 (2020 04:55) (18 - 18)  SpO2: 93% (2020 04:55) (93% - 97%)  Daily Weight in k (2020 05:46)    GENERAL:  No acute distress  HEENT:  Normocephalic/atraumatic,  no scleral icterus  CHEST:  Normal effort, no accessory muscle use  ABDOMEN:  Soft, non-tender, non-distended, normoactive bowel sounds  EXTREMITIES:  No cyanosis, clubbing, or edema  SKIN:  No rash/erythema  NEURO:  Alert and oriented x 3, + asterixis    LABS:                        8.7    17.16 )-----------( 78       ( 2020 08:19 )             25.7     01-07    131<L>  |  98  |  18  ----------------------------<  140<H>  3.4<L>   |  24  |  1.16    Ca    8.3<L>      2020 07:13    TPro  5.4<L>  /  Alb  2.3<L>  /  TBili  9.8<H>  /  DBili  x   /  AST  21  /  ALT  11  /  AlkPhos  125<H>      LIVER FUNCTIONS - ( 2020 07:13 )  Alb: 2.3 g/dL / Pro: 5.4 g/dL / ALK PHOS: 125 U/L / ALT: 11 U/L / AST: 21 U/L / GGT: x           PT/INR - ( 2020 08:53 )   PT: 23.2 sec;   INR: 2.01 ratio      Imaging:    No new imaging Chief Complaint: Left thigh swelling    Interval Events:   - The patient denies fevers/chills overnight  - He denies left thigh and leg pain    Allergies:  codeine (Anaphylaxis)  NO  RED MEAT (Unknown)    MEDICATIONS  (STANDING):  dextrose 5%. 1000 milliLiter(s) (50 mL/Hr) IV Continuous <Continuous>  dextrose 50% Injectable 12.5 Gram(s) IV Push once  dextrose 50% Injectable 25 Gram(s) IV Push once  dextrose 50% Injectable 25 Gram(s) IV Push once  ferrous    sulfate 325 milliGRAM(s) Oral daily  folic acid 1 milliGRAM(s) Oral daily  furosemide    Tablet 20 milliGRAM(s) Oral daily  insulin glargine Injectable (LANTUS) 24 Unit(s) SubCutaneous at bedtime  insulin lispro (HumaLOG) corrective regimen sliding scale   SubCutaneous three times a day before meals  insulin lispro (HumaLOG) corrective regimen sliding scale   SubCutaneous at bedtime  insulin lispro Injectable (HumaLOG) 8 Unit(s) SubCutaneous three times a day before meals  lactulose Syrup 20 Gram(s) Oral three times a day  meropenem  IVPB      meropenem  IVPB 1000 milliGRAM(s) IV Intermittent every 8 hours  midodrine. 20 milliGRAM(s) Oral three times a day  pantoprazole    Tablet 40 milliGRAM(s) Oral two times a day  rifAXIMin 550 milliGRAM(s) Oral two times a day  spironolactone 50 milliGRAM(s) Oral daily  tamsulosin 0.4 milliGRAM(s) Oral at bedtime  vancomycin  IVPB 1250 milliGRAM(s) IV Intermittent every 12 hours  vancomycin  IVPB        MEDICATIONS  (PRN):  dextrose 40% Gel 15 Gram(s) Oral once PRN Blood Glucose LESS THAN 70 milliGRAM(s)/deciliter  glucagon  Injectable 1 milliGRAM(s) IntraMuscular once PRN Glucose LESS THAN 70 milligrams/deciliter    PMHX/PSHX:  GIB (gastrointestinal bleeding)  GERD with esophagitis  Hepatic encephalopathy  Obesity  Fatty liver disease, nonalcoholic  Renal stones  Hypertension  Neuropathy  Hypercholesteremia  Diabetes  S/P cholecystectomy    Family history:  Family history of type 2 diabetes mellitus  Family history of hypertension  Family history of stomach cancer    ROS:     General:  No wt loss, fevers, chills, night sweats, fatigue,   Eyes:  Good vision, no reported pain  ENT:  No sore throat, pain, runny nose, dysphagia  CV:  No pain, palpitations, hypo/hypertension  Pulm:  No dyspnea, cough, tachypnea, wheezing  GI:  No pain, No nausea, No vomiting, No diarrhea, No constipation, No weight loss, No fever, No pruritis, No rectal bleeding, No tarry stools, No dysphagia  :  No pain, bleeding, incontinence, nocturia  Muscle:  No pain, weakness  Neuro:  No weakness, tingling, memory problems  Psych:  No fatigue, insomnia, mood problems, depression  Endocrine:  No polyuria, polydipsia, cold/heat intolerance  Heme:  No petechiae, ecchymosis, easy bruisability  Skin:  No rash, tattoos, scars, edema    PHYSICAL EXAM:   Vital Signs:  Vital Signs Last 24 Hrs  T(C): 37.1 (2020 04:55), Max: 37.6 (2020 12:05)  T(F): 98.7 (2020 04:55), Max: 99.6 (2020 12:05)  HR: 82 (2020 04:55) (82 - 90)  BP: 106/62 (2020 04:55) (106/62 - 119/65)  BP(mean): --  RR: 18 (2020 04:55) (18 - 18)  SpO2: 93% (2020 04:55) (93% - 97%)  Daily Weight in k (2020 05:46)    GENERAL:  No acute distress  HEENT:  Normocephalic/atraumatic,  +scleral icterus  CHEST:  Normal effort, no accessory muscle use  ABDOMEN:  Soft, non-tender, non-distended, normoactive bowel sounds  EXTREMITIES:  No cyanosis, clubbing, +edema (LLE>RLE)  SKIN:  No rash/erythema  NEURO:  Alert and oriented x 3, + asterixis    LABS:                        8.7    17.16 )-----------( 78       ( 2020 08:19 )             25.7     01-07    131<L>  |  98  |  18  ----------------------------<  140<H>  3.4<L>   |  24  |  1.16    Ca    8.3<L>      2020 07:13    TPro  5.4<L>  /  Alb  2.3<L>  /  TBili  9.8<H>  /  DBili  x   /  AST  21  /  ALT  11  /  AlkPhos  125<H>  -    LIVER FUNCTIONS - ( 2020 07:13 )  Alb: 2.3 g/dL / Pro: 5.4 g/dL / ALK PHOS: 125 U/L / ALT: 11 U/L / AST: 21 U/L / GGT: x           PT/INR - ( 2020 08:53 )   PT: 23.2 sec;   INR: 2.01 ratio      Imaging:    No new imaging

## 2020-01-08 LAB
ALBUMIN SERPL ELPH-MCNC: 2.2 G/DL — LOW (ref 3.3–5)
ALP SERPL-CCNC: 127 U/L — HIGH (ref 40–120)
ALT FLD-CCNC: 13 U/L — SIGNIFICANT CHANGE UP (ref 10–45)
ANION GAP SERPL CALC-SCNC: 13 MMOL/L — SIGNIFICANT CHANGE UP (ref 5–17)
AST SERPL-CCNC: 18 U/L — SIGNIFICANT CHANGE UP (ref 10–40)
BILIRUB SERPL-MCNC: 8.1 MG/DL — HIGH (ref 0.2–1.2)
BUN SERPL-MCNC: 15 MG/DL — SIGNIFICANT CHANGE UP (ref 7–23)
CALCIUM SERPL-MCNC: 8.1 MG/DL — LOW (ref 8.4–10.5)
CHLORIDE SERPL-SCNC: 100 MMOL/L — SIGNIFICANT CHANGE UP (ref 96–108)
CO2 SERPL-SCNC: 22 MMOL/L — SIGNIFICANT CHANGE UP (ref 22–31)
CREAT SERPL-MCNC: 1.05 MG/DL — SIGNIFICANT CHANGE UP (ref 0.5–1.3)
GLUCOSE BLDC GLUCOMTR-MCNC: 127 MG/DL — HIGH (ref 70–99)
GLUCOSE BLDC GLUCOMTR-MCNC: 145 MG/DL — HIGH (ref 70–99)
GLUCOSE BLDC GLUCOMTR-MCNC: 187 MG/DL — HIGH (ref 70–99)
GLUCOSE BLDC GLUCOMTR-MCNC: 222 MG/DL — HIGH (ref 70–99)
GLUCOSE SERPL-MCNC: 143 MG/DL — HIGH (ref 70–99)
HCT VFR BLD CALC: 23.1 % — LOW (ref 39–50)
HGB BLD-MCNC: 7.7 G/DL — LOW (ref 13–17)
INR BLD: 1.96 RATIO — HIGH (ref 0.88–1.16)
MCHC RBC-ENTMCNC: 33.3 GM/DL — SIGNIFICANT CHANGE UP (ref 32–36)
MCHC RBC-ENTMCNC: 34.8 PG — HIGH (ref 27–34)
MCV RBC AUTO: 104.5 FL — HIGH (ref 80–100)
PLATELET # BLD AUTO: 87 K/UL — LOW (ref 150–400)
POTASSIUM SERPL-MCNC: 4 MMOL/L — SIGNIFICANT CHANGE UP (ref 3.5–5.3)
POTASSIUM SERPL-SCNC: 4 MMOL/L — SIGNIFICANT CHANGE UP (ref 3.5–5.3)
PROT SERPL-MCNC: 5 G/DL — LOW (ref 6–8.3)
PROTHROM AB SERPL-ACNC: 23 SEC — HIGH (ref 10–13.1)
RBC # BLD: 2.21 M/UL — LOW (ref 4.2–5.8)
RBC # FLD: 23.9 % — HIGH (ref 10.3–14.5)
SODIUM SERPL-SCNC: 135 MMOL/L — SIGNIFICANT CHANGE UP (ref 135–145)
WBC # BLD: 8.4 K/UL — SIGNIFICANT CHANGE UP (ref 3.8–10.5)
WBC # FLD AUTO: 8.4 K/UL — SIGNIFICANT CHANGE UP (ref 3.8–10.5)

## 2020-01-08 PROCEDURE — 99232 SBSQ HOSP IP/OBS MODERATE 35: CPT | Mod: GC

## 2020-01-08 PROCEDURE — 99232 SBSQ HOSP IP/OBS MODERATE 35: CPT

## 2020-01-08 RX ADMIN — Medication 8 UNIT(S): at 08:42

## 2020-01-08 RX ADMIN — LACTULOSE 20 GRAM(S): 10 SOLUTION ORAL at 21:49

## 2020-01-08 RX ADMIN — SPIRONOLACTONE 50 MILLIGRAM(S): 25 TABLET, FILM COATED ORAL at 06:25

## 2020-01-08 RX ADMIN — PANTOPRAZOLE SODIUM 40 MILLIGRAM(S): 20 TABLET, DELAYED RELEASE ORAL at 17:44

## 2020-01-08 RX ADMIN — MEROPENEM 100 MILLIGRAM(S): 1 INJECTION INTRAVENOUS at 06:25

## 2020-01-08 RX ADMIN — MIDODRINE HYDROCHLORIDE 20 MILLIGRAM(S): 2.5 TABLET ORAL at 13:00

## 2020-01-08 RX ADMIN — LACTULOSE 20 GRAM(S): 10 SOLUTION ORAL at 06:25

## 2020-01-08 RX ADMIN — Medication 8 UNIT(S): at 17:46

## 2020-01-08 RX ADMIN — Medication 1: at 17:46

## 2020-01-08 RX ADMIN — Medication 1 TABLET(S): at 17:45

## 2020-01-08 RX ADMIN — Medication 40 MILLIGRAM(S): at 06:25

## 2020-01-08 RX ADMIN — INSULIN GLARGINE 24 UNIT(S): 100 INJECTION, SOLUTION SUBCUTANEOUS at 21:48

## 2020-01-08 RX ADMIN — Medication 8 UNIT(S): at 12:45

## 2020-01-08 RX ADMIN — TAMSULOSIN HYDROCHLORIDE 0.4 MILLIGRAM(S): 0.4 CAPSULE ORAL at 21:49

## 2020-01-08 RX ADMIN — Medication 1 MILLIGRAM(S): at 12:08

## 2020-01-08 RX ADMIN — LACTULOSE 20 GRAM(S): 10 SOLUTION ORAL at 14:16

## 2020-01-08 RX ADMIN — PANTOPRAZOLE SODIUM 40 MILLIGRAM(S): 20 TABLET, DELAYED RELEASE ORAL at 06:25

## 2020-01-08 RX ADMIN — Medication 325 MILLIGRAM(S): at 12:08

## 2020-01-08 RX ADMIN — MIDODRINE HYDROCHLORIDE 20 MILLIGRAM(S): 2.5 TABLET ORAL at 06:25

## 2020-01-08 RX ADMIN — MIDODRINE HYDROCHLORIDE 20 MILLIGRAM(S): 2.5 TABLET ORAL at 17:41

## 2020-01-08 NOTE — PROGRESS NOTE ADULT - SUBJECTIVE AND OBJECTIVE BOX
Follow Up:  Leukocytosis, UTI    Interval History: afebrile overnight. no n/v/d. minimal pain at left thigh hematoma.     REVIEW OF SYSTEMS  [  ] ROS unobtainable because:    [ x ] All other systems negative except as noted below    Constitutional:  [ ] fever [ ] chills  [ ] weight loss  [ ] weakness  Skin:  [ ] rash [ ] phlebitis	  Eyes: [ ] icterus [ ] pain  [ ] discharge	  ENMT: [ ] sore throat  [ ] thrush [ ] ulcers [ ] exudates  Respiratory: [ ] dyspnea [ ] hemoptysis [ ] cough [ ] sputum	  Cardiovascular:  [ ] chest pain [ ] palpitations [ ] edema	  Gastrointestinal:  [ ] nausea [ ] vomiting [ ] diarrhea [ ] constipation [ ] pain	  Genitourinary:  [ ] dysuria [ ] frequency [ ] hematuria [ ] discharge [ ] flank pain  [ ] incontinence  Musculoskeletal:  [ ] myalgias [ ] arthralgias [ ] arthritis  [ ] back pain  Neurological:  [ ] headache [ ] seizures  [ ] confusion/altered mental status    Allergies  codeine (Anaphylaxis)        ANTIMICROBIALS:  rifAXIMin 550 two times a day  trimethoprim  160 mG/sulfamethoxazole 800 mG 1 two times a day      OTHER MEDS:  MEDICATIONS  (STANDING):  dextrose 40% Gel 15 once PRN  dextrose 50% Injectable 12.5 once  dextrose 50% Injectable 25 once  dextrose 50% Injectable 25 once  furosemide    Tablet 40 daily  glucagon  Injectable 1 once PRN  insulin glargine Injectable (LANTUS) 24 at bedtime  insulin lispro (HumaLOG) corrective regimen sliding scale  three times a day before meals  insulin lispro (HumaLOG) corrective regimen sliding scale  at bedtime  insulin lispro Injectable (HumaLOG) 8 three times a day before meals  lactulose Syrup 20 three times a day  midodrine. 20 three times a day  pantoprazole    Tablet 40 two times a day  spironolactone 50 daily  tamsulosin 0.4 at bedtime      Vital Signs Last 24 Hrs  T(C): 36.9 (08 Jan 2020 11:57), Max: 37.1 (07 Jan 2020 21:15)  T(F): 98.5 (08 Jan 2020 11:57), Max: 98.8 (07 Jan 2020 21:15)  HR: 79 (08 Jan 2020 11:57) (79 - 91)  BP: 108/63 (08 Jan 2020 11:57) (108/60 - 117/67)  BP(mean): --  RR: 18 (08 Jan 2020 11:57) (18 - 19)  SpO2: 98% (08 Jan 2020 11:57) (94% - 98%)    PHYSICAL EXAMINATION:  General: Jaundice, Alert and Awake, NAD  HEENT: scleral icterus, PERRL, EOMI  Neck: Supple  Cardiac: RRR, No M/R/G  Resp: CTAB, No Wh/Rh/Ra  Abdomen: NBS, NT/ND, No HSM, No rigidity or guarding  MSK: Left lateral thigh hematoma with minimal tenderness to palpation, No LE edema. No Calf tenderness  : No bob  Skin: No rashes or lesions. Skin is warm and dry to the touch.   Neuro: Alert and Awake. CN 2-12 Grossly intact. Moves all four extremities spontaneously.  Psych: Calm, Pleasant, Cooperative                          7.7    8.40  )-----------( 87       ( 08 Jan 2020 09:05 )             23.1       01-08    135  |  100  |  15  ----------------------------<  143<H>  4.0   |  22  |  1.05    Ca    8.1<L>      08 Jan 2020 06:38    TPro  5.0<L>  /  Alb  2.2<L>  /  TBili  8.1<H>  /  DBili  x   /  AST  18  /  ALT  13  /  AlkPhos  127<H>  01-08          MICROBIOLOGY:  v  .Urine Clean Catch (Midstream)  01-05-20   >100,000 CFU/ml Escherichia coli ESBL  --  Escherichia coli ESBL      .Blood Blood-Peripheral  01-05-20   No growth to date.  --  --      .Urine Clean Catch (Midstream)  12-10-19   >100,000 CFU/ml Escherichia coli ESBL  >100,000 CFU/ml Klebsiella pneumoniae  --  Escherichia coli ESBL  Klebsiella pneumoniae        CMV IgG Antibody: <0.20 U/mL (12-04-19 @ 08:33)  Toxoplasma IgG Screen: <3.0 IU/mL (12-04-19 @ 08:33)    RADIOLOGY:    EXAM:  XR CHEST PORTABLE URGENT 1V                        PROCEDURE DATE:  01/04/2020    Heart is magnified by technique.  The lungs are clear. No pleural effusion or pneumothorax  No acute bony findings.    TRANSPLANT CLEARANCE LABWORK    Treponema Pallidum Antibody Interpretation: Negative (12-04-19 @ 08:33)  Treponema Pallidum Antibody Interpretation: Negative (12-03-19 @ 09:01)    Quantiferon TB Plus: NEGATIVE (12-04-19 @ 08:38)  Quantiferon TB Plus Nil: 0.16 IU/mL (12-04-19 @ 08:38)  Quantiferon TB Plus TB1 minus Nil: 0.00 IU/mL (12-04-19 @ 08:38)  Quantiferon TB Plus TB2 minus Nil: 0.01 IU/mL (12-04-19 @ 08:38)  Quantiferon TB Plus: NEGATIVE (12-03-19 @ 08:51)  Quantiferon TB Plus Nil: 0.03 IU/mL (12-03-19 @ 08:51)  Quantiferon TB Plus TB1 minus Nil: 0.00 IU/mL (12-03-19 @ 08:51)  Quantiferon TB Plus TB2 minus Nil: 0.00 IU/mL (12-03-19 @ 08:51)     EBV VCA IgM EIA: <10.0 U/mL (12-04-19 @ 08:33)  EBV VCA IgG EIA: >750.0 U/mL (12-04-19 @ 08:33)  EBV EA Ab EIA: <5.0 U/mL (12-04-19 @ 08:33)     CMV IgG Antibody: <0.20 U/mL (12-04-19 @ 08:33)  CMV IgG Interpretation: Negative (12-04-19 @ 08:33)     Herpes simplex 1 IgG Ab Result: 37.20 Index (12-04-19 @ 08:33)  Herpes simplex 1 IgG Ab Interp: Positive (12-04-19 @ 08:33)  Herpes simplex 2 IgG Ab Result: 0.20 Index (12-04-19 @ 08:33)  Herpes simplex 2 IgG Ab Interp: Negative (12-04-19 @ 08:33)     Varicella IgG Antibody: 2083.0 Index (12-04-19 @ 08:33)  Varicella IgG Interpretation: Positive (12-04-19 @ 08:33)     Hepatitis A IgG Ab Result: Reactive (12-04-19 @ 08:33)     Hepatitis B Core Antibody, Total: Nonreact (12-04-19 @ 08:33)  Hepatitis B Surface Antibody: Nonreact (12-04-19 @ 08:33)  Hepatitis B Surface Antigen: Nonreact (12-04-19 @ 08:33)  Hepatitis B Surface AB Quantitative: <3.0 mIU/mL (12-04-19 @ 08:33)  Hepatitis B Surface Antigen: Nonreact (12-03-19 @ 09:01)  Hepatitis B Core IgM Antibody: Nonreact (12-03-19 @ 09:01)  Hepatitis B Surface Antigen: Nonreact (11-27-19 @ 00:11)  Hepatitis B Core IgM Antibody: Nonreact (11-27-19 @ 00:11)  Hepatitis B Core IgM Antibody: Nonreact (10-23-19 @ 17:56)  Hepatitis B Surface Antigen: Nonreact (10-23-19 @ 17:56)     Hepatitis C RNA, Qualitative: NotDetec (12-04-19 @ 08:38)  Hepatitis C RNA Qualitative Interp: HCV RNA Qualitative assay by RT-PCR using [employer] m2000    The limit of detection: 12 IU/mL.    Result Interpretation:  Not Detected:  HCV RNA is not detected; possibly indicates resolved past  infection or a false reactive antibody result.  Detected:        HCV RNA is detected; indicates hepatitis C virus  infection.  CDC guidelines and [facility] require confirmation of reactive  hepatitis C antibody screening tests. Results should be viewed in the  context of clinical history and other labtests. An indeterminate result  indicates that HCV RNA detection cannot be resolved, please consider  retesting with a new sample. The performance characteristics of this  assay have been determined by [facility] [employer]. The U.[place] Food and  Drug Administration (FDA) has not approved or cleared this test, however,  FDA clearance or approval is not currently required for clinical use. (12-04-19 @ 08:38)  Hepatitis C Virus S/CO Ratio: 0.14 S/CO (12-04-19 @ 08:33)  Hepatitis C Virus Interpretation: Nonreact (12-04-19 @ 08:33)  Hepatitis C Virus S/CO Ratio: 0.12 S/CO (12-03-19 @ 09:01)  Hepatitis C Virus Interpretation: Nonreact (12-03-19 @ 09:01)  Hepatitis C Virus S/CO Ratio: 0.16 S/CO (11-27-19 @ 00:11)  Hepatitis C Virus Interpretation: Nonreact (11-27-19 @ 00:11)  Hepatitis C Virus S/CO Ratio: 0.15 S/CO (10-23-19 @ 17:56)  Hepatitis C Virus Interpretation: Nonreact (10-23-19 @ 17:56)  Hepatitis C Virus S/CO Ratio: 0.14 S/CO (04-03-19 @ 16:27)  Hepatitis C Virus Interpretation: Nonreact (04-03-19 @ 16:27)     Mumps Virus IgG Antibody.: 236.0 AU/mL (12-17-19 @ 10:27)  Mumps Antibody Interpretation: Positive (12-17-19 @ 10:27)    Measles IgG Interpretation: Positive (12-17-19 @ 10:27)    Rubella Virus IgG Antibody.: 30.0 Index (12-04-19 @ 08:33)  Rubella IgG Interp: Positive (12-04-19 @ 08:33)    Toxoplasma IgG Screen: <3.0 IU/mL (12-04-19 @ 08:33)  Toxoplasma IgG Interpretation: Negative (12-04-19 @ 08:33)

## 2020-01-08 NOTE — PROGRESS NOTE ADULT - SUBJECTIVE AND OBJECTIVE BOX
Chief Complaint: Left thigh swelling    Interval Events:   - The patient denies fevers/chills overnight  - He continues to deny left thigh and leg pain    Allergies:  codeine (Anaphylaxis)  NO  RED MEAT (Unknown)    MEDICATIONS  (STANDING):  dextrose 5%. 1000 milliLiter(s) (50 mL/Hr) IV Continuous <Continuous>  dextrose 50% Injectable 12.5 Gram(s) IV Push once  dextrose 50% Injectable 25 Gram(s) IV Push once  dextrose 50% Injectable 25 Gram(s) IV Push once  ferrous    sulfate 325 milliGRAM(s) Oral daily  folic acid 1 milliGRAM(s) Oral daily  furosemide    Tablet 40 milliGRAM(s) Oral daily  insulin glargine Injectable (LANTUS) 24 Unit(s) SubCutaneous at bedtime  insulin lispro (HumaLOG) corrective regimen sliding scale   SubCutaneous three times a day before meals  insulin lispro (HumaLOG) corrective regimen sliding scale   SubCutaneous at bedtime  insulin lispro Injectable (HumaLOG) 8 Unit(s) SubCutaneous three times a day before meals  lactulose Syrup 20 Gram(s) Oral three times a day  meropenem  IVPB      meropenem  IVPB 1000 milliGRAM(s) IV Intermittent every 8 hours  midodrine. 20 milliGRAM(s) Oral three times a day  pantoprazole    Tablet 40 milliGRAM(s) Oral two times a day  rifAXIMin 550 milliGRAM(s) Oral two times a day  spironolactone 50 milliGRAM(s) Oral daily  tamsulosin 0.4 milliGRAM(s) Oral at bedtime    MEDICATIONS  (PRN):  dextrose 40% Gel 15 Gram(s) Oral once PRN Blood Glucose LESS THAN 70 milliGRAM(s)/deciliter  glucagon  Injectable 1 milliGRAM(s) IntraMuscular once PRN Glucose LESS THAN 70 milligrams/deciliter    PMHX/PSHX:  GIB (gastrointestinal bleeding)  GERD with esophagitis  Hepatic encephalopathy  Obesity  Fatty liver disease, nonalcoholic  Renal stones  Hypertension  Neuropathy  Hypercholesteremia  Diabetes  S/P cholecystectomy    Family history:  Family history of type 2 diabetes mellitus  Family history of hypertension  Family history of stomach cancer    ROS:     General:  No wt loss, fevers, chills, night sweats, fatigue,   Eyes:  Good vision, no reported pain  ENT:  No sore throat, pain, runny nose, dysphagia  CV:  No pain, palpitations, hypo/hypertension  Pulm:  No dyspnea, cough, tachypnea, wheezing  GI:  No pain, No nausea, No vomiting, No diarrhea, No constipation, No weight loss, No fever, No pruritis, No rectal bleeding, No tarry stools, No dysphagia  :  No pain, bleeding, incontinence, nocturia  Muscle:  No pain, weakness  Neuro:  No weakness, tingling, memory problems  Psych:  No fatigue, insomnia, mood problems, depression  Endocrine:  No polyuria, polydipsia, cold/heat intolerance  Heme:  No petechiae, ecchymosis, easy bruisability  Skin:  No rash, tattoos, scars, edema    PHYSICAL EXAM:   Vital Signs:  Vital Signs Last 24 Hrs  T(C): 36.8 (2020 05:17), Max: 37.1 (2020 21:15)  T(F): 98.3 (2020 05:17), Max: 98.8 (2020 21:15)  HR: 79 (2020 05:17) (79 - 91)  BP: 108/60 (2020 05:17) (108/60 - 117/67)  BP(mean): --  RR: 18 (2020 05:17) (18 - 19)  SpO2: 94% (2020 05:17) (93% - 95%)   Daily Weight in k (2020 06:07)    GENERAL:  No acute distress  HEENT:  Normocephalic/atraumatic,  +scleral icterus  CHEST:  Normal effort, no accessory muscle use  ABDOMEN:  Soft, non-tender, non-distended, normoactive bowel sounds  EXTREMITIES:  No cyanosis, clubbing, +edema (LLE>RLE)  SKIN:  No rash/erythema  NEURO:  Alert and oriented x 3, + asterixis    LABS:                        8.0    12.39 )-----------( 81       ( 2020 09:41 )             23.4     01-08    135  |  100  |  15  ----------------------------<  143<H>  4.0   |  22  |  1.05    Ca    8.1<L>      2020 06:38    TPro  5.0<L>  /  Alb  2.2<L>  /  TBili  8.1<H>  /  DBili  x   /  AST  18  /  ALT  13  /  AlkPhos  127<H>  01-08    LIVER FUNCTIONS - ( 2020 06:38 )  Alb: 2.2 g/dL / Pro: 5.0 g/dL / ALK PHOS: 127 U/L / ALT: 13 U/L / AST: 18 U/L / GGT: x           PT/INR - ( 2020 08:53 )   PT: 23.2 sec;   INR: 2.01 ratio      Imaging:    No new imaging

## 2020-01-08 NOTE — PROGRESS NOTE ADULT - SUBJECTIVE AND OBJECTIVE BOX
INTERVAL HPI/OVERNIGHT EVENTS:    no overnight events noted   patient seen and examined at bedside  reports feeling better today  +large, formed brown BM x 1 this AM   tolerating diet  Denies n/v/d/c abd pain   labs noted; leukocytosis improving on abx     Allergies    codeine (Anaphylaxis)    Intolerances    NO  RED MEAT (Unknown)    General:  No wt loss, fevers, chills, night sweats, fatigue,   Eyes:  Good vision, no reported pain  ENT:  No sore throat, pain, runny nose, dysphagia  CV:  No pain, palpitations, hypo/hypertension  Resp:  No dyspnea, cough, tachypnea, wheezing  GI:  No pain, No nausea, No vomiting, No diarrhea, No constipation, No weight loss, No fever, No pruritis, No rectal bleeding, No tarry stools, No dysphagia,  :  No pain, bleeding, incontinence, nocturia  Muscle:  No pain, weakness  Neuro:  No weakness, tingling, memory problems  Psych:  No fatigue, insomnia, mood problems, depression  Endocrine:  No polyuria, polydipsia, cold/heat intolerance  Heme:  No petechiae, ecchymosis, easy bruisability  Skin:  No rash, tattoos, scars, edema      PHYSICAL EXAM:   Vital Signs:  Vital Signs Last 24 Hrs  T(C): 37 (2020 14:40), Max: 38.2 (2020 20:59)  T(F): 98.6 (2020 14:40), Max: 100.8 (2020 20:59)  HR: 87 (2020 14:40) (84 - 94)  BP: 111/61 (2020 14:40) (111/61 - 138/76)  BP(mean): --  RR: 18 (2020 14:40) (17 - 18)  SpO2: 96% (2020 14:40) (95% - 97%)  Daily     Daily Weight in k.4 (2020 04:46)I&O's Summary    2020 07:01  -  2020 07:00  --------------------------------------------------------  IN: 840 mL / OUT: 2600 mL / NET: -1760 mL    2020 07:01  -  2020 15:26  --------------------------------------------------------  IN: 480 mL / OUT: 0 mL / NET: 480 mL        GENERAL:  lying in bed, NAD   HEENT:  NC/AT  ABDOMEN:  obese, Soft, non-tender, softly distended, normoactive bowel sounds  EXTEREMITIES:  L calf hematoma, LLE edema   NEURO:  Alert, oriented, no asterixis, no tremor, no encephalopathy      LABS:                        7.8    12.25 )-----------( 79       ( 2020 07:26 )             24.1     01-    132<L>  |  99  |  14  ----------------------------<  113<H>  3.6   |  24  |  1.06    Ca    8.4      2020 07:26    TPro  5.2<L>  /  Alb  2.5<L>  /  TBili  9.9<H>  /  DBili  x   /  AST  26  /  ALT  14  /  AlkPhos  115  01-05    PT/INR - ( 2020 07:26 )   PT: 24.4 sec;   INR: 2.07 ratio           Urinalysis Basic - ( 2020 07:26 )    Color: Dark Yellow / Appearance: Slightly Turbid / S.025 / pH: x  Gluc: x / Ketone: Trace  / Bili: Small / Urobili: 6 mg/dL   Blood: x / Protein: 30 mg/dL / Nitrite: Negative   Leuk Esterase: Large / RBC: 8 /hpf /  /HPF   Sq Epi: x / Non Sq Epi: 0 /hpf / Bacteria: Many      amylase   lipase  RADIOLOGY & ADDITIONAL TESTS:

## 2020-01-08 NOTE — PROGRESS NOTE ADULT - ASSESSMENT
63 year old male with HTN, DM2, non-alcoholic cirrhosis, who was recently admitted with altered mental status, hyponatremia and high ammonia level. We last saw him during a hospitalization in 10/2019, during which he was significantly volume overloaded and required iv diuresis.  He is currently being evaluated for liver transplant.  He returns last week with acute blood loss anemia. CT with trace pleural effusions, anasarca, and atherosclerotic calcifications. He has a left thigh hematoma.  EGD yesterday with mild gave s/p APC treatment    Abnormal EKG  - His EKGs have demonstrated prolonged qtc in the past  - repeat ekg shows normal QT  - avoid qtc prolonging medications and replete K to greater than 4 and mg > 2     Diastolic HF   - CXR is clear and CT with only trace effusions. Though still with lower ext edema  - he does have some le edema in the setting of his hypoalbuminemia and diastolic dysfunction.  Now on Lasix and spironolactone.    - TTE normal LV systolic function EF 65%; LVH, DD Grade I  - cath with normal filling pressures and no significant cad.  - no asa in setting of acute blood loss anemia.    HTN  - BP on softer side  - hold anti-hypertensives except for diuretics.   - cont midodrine      Anemia  - transfuse to keep Hb >8. trend H/H  - GI follow up appreciated  - monitor thigh hematoma.    - All other workup as per primary team

## 2020-01-08 NOTE — PROGRESS NOTE ADULT - ASSESSMENT
Impression:  63 yo M with IDDM, dyslipidemia, obesity, GERD, HFpEF with mild LV diastolic dysfunction, and decompensated JEAN cirrhosis complicated by ascites, history of SBP, hepatic encephalopathy, and chronic anemia with a history of duodenal ulcer as well as GAVE and duodenal AVM s/p APC (last on 10/11/19), who is UNOS listed for liver transplantation at [facility]. He was re-admitted from subacute rehab on 12/26/19 due to acute on chronic anemia without overt GI bleeding (found to have LLE hematoma), also with hepatic encephalopathy.    # Leukocytosis and low grade fever: Concern for underlying infection - primary concern for UTI given urine culture results. Minimal concern for infected left thigh hematoma given no erythema, induration, or warmth on exam. Transplant ID following. On meropenem for treatment of UTI pending urine culture susceptibiliteis.  # Left leg swelling and pain      - Large 14cm left thigh hematoma  #Acute on chronic anemia     - acute component likely secondary to thigh hematoma above     - no active bleeding seen on CTA on 1/3/20      - s/p EGD 12/30 with mild GAVE s/p APC treatment  # Decompensated JEAN cirrhosis, MELD-Na 27 on 1/7/20     - varices: grade I <5mm varices on EGD 12/30 not amenable to banding; also mild GAVE s/p APC      - SPE: none currently but waxes and wanes.  On lactulose and Xifaxan     - ascites: trace, not on diuretics     - HCC: none on CT this admission     - Transplant: Blood Type A positive, UNOS listed with updated MELD-Na of 27 last week    Recommendations:  - F/U Transplant ID recommendations  - F/U blood and urine cultures   - daily CBC, CMP, INR  - monitor exam for size of hematoma and s/sx of compartment syndrome  - PPI PO BID for GAVE  - Continue furosemide at 40 mg PO daily  - 1.5L fluid restriction  - Low salt diet  - Continue Glucerna TID with meals given low albumin  - Lactulose TID and Xifaxan BID for HE     Please call Hepatology ([phone]) if there are any additional questions or concerns. Please call on-call GI fellow after 5pm and before 8am, and on weekends. Impression:  63 yo M with IDDM, dyslipidemia, obesity, GERD, HFpEF with mild LV diastolic dysfunction, and decompensated JEAN cirrhosis complicated by ascites, history of SBP, hepatic encephalopathy, and chronic anemia with a history of duodenal ulcer as well as GAVE and duodenal AVM s/p APC (last on 10/11/19), who is UNOS listed for liver transplantation at [facility]. He was re-admitted from subacute rehab on 12/26/19 due to acute on chronic anemia without overt GI bleeding (found to have LLE hematoma), also with hepatic encephalopathy.    # Leukocytosis and low grade fever: Found to have ESBL UTI. Minimal concern for infected left thigh hematoma given no erythema, induration, or warmth on exam. Transplant ID following. On meropenem for treatment of UTI pending urine culture susceptibiliteis.  # Left leg swelling and pain      - Large 14cm left thigh hematoma  #Acute on chronic anemia     - acute component likely secondary to thigh hematoma above     - no active bleeding seen on CTA on 1/3/20      - s/p EGD 12/30 with mild GAVE s/p APC treatment  # Decompensated JEAN cirrhosis, MELD-Na 27 on 1/7/20     - varices: grade I <5mm varices on EGD 12/30 not amenable to banding; also mild GAVE s/p APC      - SPE: none currently but waxes and wanes.  On lactulose and Xifaxan     - ascites: trace, not on diuretics     - HCC: none on CT this admission     - Transplant: Blood Type A positive, UNOS listed with updated MELD-Na of 27 last week    Recommendations:  - F/U Transplant ID recommendations   - F/U blood and urine cultures   - daily CBC, CMP, INR  - monitor exam for size of hematoma and s/sx of compartment syndrome  - PPI PO BID for GAVE  - Continue furosemide at 40 mg PO daily  - 1.5L fluid restriction  - Low salt diet  - Continue Glucerna TID with meals given low albumin  - Lactulose TID and Xifaxan BID for HE     Please call Hepatology ([phone]) if there are any additional questions or concerns. Please call on-call GI fellow after 5pm and before 8am, and on weekends.

## 2020-01-08 NOTE — PROVIDER CONTACT NOTE (OTHER) - RECOMMENDATIONS
cold compress
reemphasized importance of PAS- encouraged ambulation and to increase physical mobility
reemphasized importance of PAS-- encouraged ambulation and to increase physical mobility
reemphasized importance of PAS-- encouraged ambulation and to increase physical mobility
risks / benefits discssed with patient.
reemphasized importance of PAS-- encouraged ambulation and to increase physical mobility

## 2020-01-08 NOTE — PROVIDER CONTACT NOTE (OTHER) - BACKGROUND
history of gib--gerd--hepatic encephalopathy--obesity -- fatty liver disease--renal stones--HTN--neuropathy--diabetes

## 2020-01-08 NOTE — PROVIDER CONTACT NOTE (OTHER) - ACTION/TREATMENT ORDERED:
PA notified, Tylenol 650mg PO x1 dose, BC x2, UA, & CXR ordered, will continue to monitor
cold compress
no new order made
risks / benefits discssed with patient.
no new order made

## 2020-01-08 NOTE — PROGRESS NOTE ADULT - SUBJECTIVE AND OBJECTIVE BOX
[facility] -- Sushila Dhaliwal, Morenita Camacho Pannella, Patel, Savella  Office # [phone]      Follow Up:  HTN, edema    Subjective/Observations: Patient seen and examined. Events noted. Resting comfortably in bed. No complaints of chest pain, dyspnea, or palpitations reported. No signs of orthopnea or PND.       REVIEW OF SYSTEMS: All other review of systems is negative unless indicated above    PAST MEDICAL & SURGICAL HISTORY:  GIB (gastrointestinal bleeding)  GERD with esophagitis: Gastritis &amp; Non Bleeding Ulcers  Hepatic encephalopathy  Obesity  Fatty liver disease, nonalcoholic  Renal stones: 25 years ago  Hypertension  Neuropathy  Hypercholesteremia  Diabetes  S/P cholecystectomy      MEDICATIONS  (STANDING):  dextrose 5%. 1000 milliLiter(s) (50 mL/Hr) IV Continuous <Continuous>  dextrose 50% Injectable 12.5 Gram(s) IV Push once  dextrose 50% Injectable 25 Gram(s) IV Push once  dextrose 50% Injectable 25 Gram(s) IV Push once  ferrous    sulfate 325 milliGRAM(s) Oral daily  folic acid 1 milliGRAM(s) Oral daily  furosemide    Tablet 40 milliGRAM(s) Oral daily  insulin glargine Injectable (LANTUS) 24 Unit(s) SubCutaneous at bedtime  insulin lispro (HumaLOG) corrective regimen sliding scale   SubCutaneous three times a day before meals  insulin lispro (HumaLOG) corrective regimen sliding scale   SubCutaneous at bedtime  insulin lispro Injectable (HumaLOG) 8 Unit(s) SubCutaneous three times a day before meals  lactulose Syrup 20 Gram(s) Oral three times a day  meropenem  IVPB      meropenem  IVPB 1000 milliGRAM(s) IV Intermittent every 8 hours  midodrine. 20 milliGRAM(s) Oral three times a day  pantoprazole    Tablet 40 milliGRAM(s) Oral two times a day  rifAXIMin 550 milliGRAM(s) Oral two times a day  spironolactone 50 milliGRAM(s) Oral daily  tamsulosin 0.4 milliGRAM(s) Oral at bedtime    MEDICATIONS  (PRN):  dextrose 40% Gel 15 Gram(s) Oral once PRN Blood Glucose LESS THAN 70 milliGRAM(s)/deciliter  glucagon  Injectable 1 milliGRAM(s) IntraMuscular once PRN Glucose LESS THAN 70 milligrams/deciliter      Allergies    codeine (Anaphylaxis)    Intolerances            Vital Signs Last 24 Hrs  T(C): 36.8 (08 Jan 2020 05:17), Max: 37.1 (07 Jan 2020 21:15)  T(F): 98.3 (08 Jan 2020 05:17), Max: 98.8 (07 Jan 2020 21:15)  HR: 79 (08 Jan 2020 05:17) (79 - 91)  BP: 108/60 (08 Jan 2020 05:17) (108/60 - 117/67)  BP(mean): --  RR: 18 (08 Jan 2020 05:17) (18 - 19)  SpO2: 94% (08 Jan 2020 05:17) (93% - 95%)    I&O's Summary    07 Jan 2020 07:01  -  08 Jan 2020 07:00  --------------------------------------------------------  IN: 890 mL / OUT: 2150 mL / NET: -1260 mL          PHYSICAL EXAM:     Constitutional: NAD, awake    HEENT: Moist Mucous Membranes, Anicteric  Pulmonary: Non-labored, breath sounds are clear bilaterally, No wheezing, rales or rhonchi  Cardiovascular: Regular, S1 and S2, No murmurs, rubs, gallops or clicks  Gastrointestinal: Bowel Sounds present, soft, nontender.   Lymph: + peripheral edema. No lymphadenopathy.  Skin: No visible rashes or ulcers.  Psych:  Mood & affect appropriate for situation    LABS: All Labs Reviewed:                        8.0    12.39 )-----------( 81       ( 07 Jan 2020 09:41 )             23.4                         8.7    17.16 )-----------( 78       ( 06 Jan 2020 08:19 )             25.7     08 Jan 2020 06:38    135    |  100    |  15     ----------------------------<  143    4.0     |  22     |  1.05   07 Jan 2020 07:13    131    |  98     |  18     ----------------------------<  140    3.4     |  24     |  1.16   06 Jan 2020 06:45    129    |  97     |  16     ----------------------------<  128    3.7     |  22     |  1.12     Ca    8.1        08 Jan 2020 06:38  Ca    8.3        07 Jan 2020 07:13  Ca    8.5        06 Jan 2020 06:45    TPro  5.0    /  Alb  2.2    /  TBili  8.1    /  DBili  x      /  AST  18     /  ALT  13     /  AlkPhos  127    08 Jan 2020 06:38  TPro  5.4    /  Alb  2.3    /  TBili  9.8    /  DBili  x      /  AST  21     /  ALT  11     /  AlkPhos  125    07 Jan 2020 07:13  TPro  5.5    /  Alb  2.5    /  TBili  11.4   /  DBili  x      /  AST  23     /  ALT  13     /  AlkPhos  111    06 Jan 2020 06:45    PT/INR - ( 08 Jan 2020 08:36 )   PT: 23.0 sec;   INR: 1.96 ratio

## 2020-01-08 NOTE — PROGRESS NOTE ADULT - SUBJECTIVE AND OBJECTIVE BOX
SUBJECTIVE / OVERNIGHT EVENTS: pt seen and examined    MEDICATIONS  (STANDING):  dextrose 5%. 1000 milliLiter(s) (50 mL/Hr) IV Continuous <Continuous>  dextrose 50% Injectable 12.5 Gram(s) IV Push once  dextrose 50% Injectable 25 Gram(s) IV Push once  dextrose 50% Injectable 25 Gram(s) IV Push once  ferrous    sulfate 325 milliGRAM(s) Oral daily  folic acid 1 milliGRAM(s) Oral daily  furosemide    Tablet 40 milliGRAM(s) Oral daily  insulin glargine Injectable (LANTUS) 24 Unit(s) SubCutaneous at bedtime  insulin lispro (HumaLOG) corrective regimen sliding scale   SubCutaneous three times a day before meals  insulin lispro (HumaLOG) corrective regimen sliding scale   SubCutaneous at bedtime  insulin lispro Injectable (HumaLOG) 8 Unit(s) SubCutaneous three times a day before meals  lactulose Syrup 20 Gram(s) Oral three times a day  midodrine. 20 milliGRAM(s) Oral three times a day  pantoprazole    Tablet 40 milliGRAM(s) Oral two times a day  rifAXIMin 550 milliGRAM(s) Oral two times a day  spironolactone 50 milliGRAM(s) Oral daily  tamsulosin 0.4 milliGRAM(s) Oral at bedtime  trimethoprim  160 mG/sulfamethoxazole 800 mG 1 Tablet(s) Oral two times a day    MEDICATIONS  (PRN):  dextrose 40% Gel 15 Gram(s) Oral once PRN Blood Glucose LESS THAN 70 milliGRAM(s)/deciliter  glucagon  Injectable 1 milliGRAM(s) IntraMuscular once PRN Glucose LESS THAN 70 milligrams/deciliter    Vital Signs Last 24 Hrs  T(C): 36.9 (2020 11:57), Max: 37.1 (2020 21:15)  T(F): 98.5 (2020 11:57), Max: 98.8 (2020 21:15)  HR: 79 (2020 11:57) (79 - 91)  BP: 108/63 (2020 11:57) (108/60 - 117/67)  BP(mean): --  RR: 18 (2020 11:57) (18 - 19)  SpO2: 98% (2020 11:57) (94% - 98%)    Constitutional: No fever, fatigue  Skin: No rash.  Eyes: No recent vision problems or eye pain.  ENT: No congestion, ear pain, or sore throat.  Cardiovascular: No chest pain or palpation.  Respiratory: No cough, shortness of breath, congestion, or wheezing.  Gastrointestinal: No abdominal pain, nausea, vomiting, or diarrhea.  Genitourinary: No dysuria.  Musculoskeletal: No joint swelling.  Neurologic: No headache.    PHYSICAL EXAM:  GENERAL: NAD  EYES: EOMI, PERRLA  NECK: Supple, No JVD  CHEST/LUNG: dec breath sounds rt base  HEART:  S1 , S2 +  ABDOMEN: soft , bs+, mild distension+  EXTREMITIES: left > rt edema+  NEUROLOGY: alert awake oriented x place, person    LABS:      135  |  100  |  15  ----------------------------<  143<H>  4.0   |  22  |  1.05    Ca    8.1<L>      2020 06:38    TPro  5.0<L>  /  Alb  2.2<L>  /  TBili  8.1<H>  /  DBili      /  AST  18  /  ALT  13  /  AlkPhos  127<H>      Creatinine Trend: 1.05 <--, 1.16 <--, 1.12 <--, 1.06 <--, 1.07 <--, 1.17 <--, 1.13 <--                        7.7    8.40  )-----------( 87       ( 2020 09:05 )             23.1     Urine Studies:  Urinalysis Basic - ( 2020 07:26 )    Color: Dark Yellow / Appearance: Slightly Turbid / S.025 / pH:   Gluc:  / Ketone: Trace  / Bili: Small / Urobili: 6 mg/dL   Blood:  / Protein: 30 mg/dL / Nitrite: Negative   Leuk Esterase: Large / RBC: 8 /hpf /  /HPF   Sq Epi:  / Non Sq Epi: 0 /hpf / Bacteria: Many              LIVER FUNCTIONS - ( 2020 06:38 )  Alb: 2.2 g/dL / Pro: 5.0 g/dL / ALK PHOS: 127 U/L / ALT: 13 U/L / AST: 18 U/L / GGT: x           PT/INR - ( 2020 08:36 )   PT: 23.0 sec;   INR: 1.96 ratio                 LIVER FUNCTIONS - ( 2020 07:13 )  Alb: 2.3 g/dL / Pro: 5.4 g/dL / ALK PHOS: 125 U/L / ALT: 11 U/L / AST: 21 U/L / GGT: x           PT/INR - ( 2020 08:53 )   PT: 23.2 sec;   INR: 2.01 ratio                     LIVER FUNCTIONS - ( 2020 06:45 )  Alb: 2.5 g/dL / Pro: 5.5 g/dL / ALK PHOS: 111 U/L / ALT: 13 U/L / AST: 23 U/L / GGT: x           PT/INR - ( 2020 08:10 )   PT: 25.2 sec;   INR: 2.16 ratio

## 2020-01-09 LAB
ALBUMIN SERPL ELPH-MCNC: 2.3 G/DL — LOW (ref 3.3–5)
ALP SERPL-CCNC: 134 U/L — HIGH (ref 40–120)
ALT FLD-CCNC: 14 U/L — SIGNIFICANT CHANGE UP (ref 10–45)
ANION GAP SERPL CALC-SCNC: 8 MMOL/L — SIGNIFICANT CHANGE UP (ref 5–17)
AST SERPL-CCNC: 21 U/L — SIGNIFICANT CHANGE UP (ref 10–40)
BILIRUB SERPL-MCNC: 8.2 MG/DL — HIGH (ref 0.2–1.2)
BLD GP AB SCN SERPL QL: NEGATIVE — SIGNIFICANT CHANGE UP
BUN SERPL-MCNC: 16 MG/DL — SIGNIFICANT CHANGE UP (ref 7–23)
CALCIUM SERPL-MCNC: 8.4 MG/DL — SIGNIFICANT CHANGE UP (ref 8.4–10.5)
CHLORIDE SERPL-SCNC: 99 MMOL/L — SIGNIFICANT CHANGE UP (ref 96–108)
CO2 SERPL-SCNC: 26 MMOL/L — SIGNIFICANT CHANGE UP (ref 22–31)
CREAT SERPL-MCNC: 1.09 MG/DL — SIGNIFICANT CHANGE UP (ref 0.5–1.3)
GLUCOSE BLDC GLUCOMTR-MCNC: 108 MG/DL — HIGH (ref 70–99)
GLUCOSE BLDC GLUCOMTR-MCNC: 120 MG/DL — HIGH (ref 70–99)
GLUCOSE BLDC GLUCOMTR-MCNC: 130 MG/DL — HIGH (ref 70–99)
GLUCOSE BLDC GLUCOMTR-MCNC: 164 MG/DL — HIGH (ref 70–99)
GLUCOSE BLDC GLUCOMTR-MCNC: 183 MG/DL — HIGH (ref 70–99)
GLUCOSE SERPL-MCNC: 120 MG/DL — HIGH (ref 70–99)
HCT VFR BLD CALC: 24.1 % — LOW (ref 39–50)
HGB BLD-MCNC: 7.9 G/DL — LOW (ref 13–17)
INR BLD: 2.01 RATIO — HIGH (ref 0.88–1.16)
MCHC RBC-ENTMCNC: 32.8 GM/DL — SIGNIFICANT CHANGE UP (ref 32–36)
MCHC RBC-ENTMCNC: 34.5 PG — HIGH (ref 27–34)
MCV RBC AUTO: 105.2 FL — HIGH (ref 80–100)
PLATELET # BLD AUTO: 87 K/UL — LOW (ref 150–400)
POTASSIUM SERPL-MCNC: 3.7 MMOL/L — SIGNIFICANT CHANGE UP (ref 3.5–5.3)
POTASSIUM SERPL-SCNC: 3.7 MMOL/L — SIGNIFICANT CHANGE UP (ref 3.5–5.3)
PROT SERPL-MCNC: 5.3 G/DL — LOW (ref 6–8.3)
PROTHROM AB SERPL-ACNC: 23.4 SEC — HIGH (ref 10–13.1)
RBC # BLD: 2.29 M/UL — LOW (ref 4.2–5.8)
RBC # FLD: 23.6 % — HIGH (ref 10.3–14.5)
RH IG SCN BLD-IMP: POSITIVE — SIGNIFICANT CHANGE UP
SODIUM SERPL-SCNC: 133 MMOL/L — LOW (ref 135–145)
WBC # BLD: 6.56 K/UL — SIGNIFICANT CHANGE UP (ref 3.8–10.5)
WBC # FLD AUTO: 6.56 K/UL — SIGNIFICANT CHANGE UP (ref 3.8–10.5)

## 2020-01-09 PROCEDURE — 99232 SBSQ HOSP IP/OBS MODERATE 35: CPT

## 2020-01-09 PROCEDURE — 73564 X-RAY EXAM KNEE 4 OR MORE: CPT | Mod: 26,LT

## 2020-01-09 PROCEDURE — 99232 SBSQ HOSP IP/OBS MODERATE 35: CPT | Mod: GC

## 2020-01-09 RX ADMIN — SPIRONOLACTONE 50 MILLIGRAM(S): 25 TABLET, FILM COATED ORAL at 06:55

## 2020-01-09 RX ADMIN — MIDODRINE HYDROCHLORIDE 20 MILLIGRAM(S): 2.5 TABLET ORAL at 17:52

## 2020-01-09 RX ADMIN — Medication 40 MILLIGRAM(S): at 06:55

## 2020-01-09 RX ADMIN — Medication 8 UNIT(S): at 12:42

## 2020-01-09 RX ADMIN — Medication 8 UNIT(S): at 08:23

## 2020-01-09 RX ADMIN — Medication 8 UNIT(S): at 17:51

## 2020-01-09 RX ADMIN — Medication 1 TABLET(S): at 06:55

## 2020-01-09 RX ADMIN — MIDODRINE HYDROCHLORIDE 20 MILLIGRAM(S): 2.5 TABLET ORAL at 06:55

## 2020-01-09 RX ADMIN — Medication 1 TABLET(S): at 17:53

## 2020-01-09 RX ADMIN — Medication 325 MILLIGRAM(S): at 11:48

## 2020-01-09 RX ADMIN — Medication 1: at 17:50

## 2020-01-09 RX ADMIN — LACTULOSE 20 GRAM(S): 10 SOLUTION ORAL at 06:55

## 2020-01-09 RX ADMIN — PANTOPRAZOLE SODIUM 40 MILLIGRAM(S): 20 TABLET, DELAYED RELEASE ORAL at 17:52

## 2020-01-09 RX ADMIN — INSULIN GLARGINE 24 UNIT(S): 100 INJECTION, SOLUTION SUBCUTANEOUS at 22:42

## 2020-01-09 RX ADMIN — LACTULOSE 20 GRAM(S): 10 SOLUTION ORAL at 14:47

## 2020-01-09 RX ADMIN — Medication 1 MILLIGRAM(S): at 11:49

## 2020-01-09 RX ADMIN — LACTULOSE 20 GRAM(S): 10 SOLUTION ORAL at 22:43

## 2020-01-09 RX ADMIN — PANTOPRAZOLE SODIUM 40 MILLIGRAM(S): 20 TABLET, DELAYED RELEASE ORAL at 06:55

## 2020-01-09 RX ADMIN — TAMSULOSIN HYDROCHLORIDE 0.4 MILLIGRAM(S): 0.4 CAPSULE ORAL at 22:43

## 2020-01-09 RX ADMIN — Medication 1: at 12:41

## 2020-01-09 RX ADMIN — MIDODRINE HYDROCHLORIDE 20 MILLIGRAM(S): 2.5 TABLET ORAL at 11:49

## 2020-01-09 NOTE — PROGRESS NOTE ADULT - SUBJECTIVE AND OBJECTIVE BOX
Follow Up:  UTI, Fever    Interval History: no chills or fevers. no pain at left thigh hematoma with minimal associated pain. no diarrhea.     REVIEW OF SYSTEMS  [  ] ROS unobtainable because:    [ x ] All other systems negative except as noted below    Constitutional:  [ ] fever [ ] chills  [ ] weight loss  [ ] weakness  Skin:  [ ] rash [ ] phlebitis	  Eyes: [ ] icterus [ ] pain  [ ] discharge	  ENMT: [ ] sore throat  [ ] thrush [ ] ulcers [ ] exudates  Respiratory: [ ] dyspnea [ ] hemoptysis [ ] cough [ ] sputum	  Cardiovascular:  [ ] chest pain [ ] palpitations [ ] edema	  Gastrointestinal:  [ ] nausea [ ] vomiting [ ] diarrhea [ ] constipation [ ] pain	  Genitourinary:  [ ] dysuria [ ] frequency [ ] hematuria [ ] discharge [ ] flank pain  [ ] incontinence  Musculoskeletal:  [ ] myalgias [ ] arthralgias [ ] arthritis  [ ] back pain  Neurological:  [ ] headache [ ] seizures  [ ] confusion/altered mental status    Allergies  codeine (Anaphylaxis)        ANTIMICROBIALS:  rifAXIMin 550 two times a day  trimethoprim  160 mG/sulfamethoxazole 800 mG 1 two times a day      OTHER MEDS:  MEDICATIONS  (STANDING):  dextrose 40% Gel 15 once PRN  dextrose 50% Injectable 12.5 once  dextrose 50% Injectable 25 once  dextrose 50% Injectable 25 once  furosemide    Tablet 40 daily  glucagon  Injectable 1 once PRN  insulin glargine Injectable (LANTUS) 24 at bedtime  insulin lispro (HumaLOG) corrective regimen sliding scale  three times a day before meals  insulin lispro (HumaLOG) corrective regimen sliding scale  at bedtime  insulin lispro Injectable (HumaLOG) 8 three times a day before meals  lactulose Syrup 20 three times a day  midodrine. 20 three times a day  pantoprazole    Tablet 40 two times a day  spironolactone 50 daily  tamsulosin 0.4 at bedtime      Vital Signs Last 24 Hrs  T(C): 36.8 (09 Jan 2020 14:02), Max: 36.9 (09 Jan 2020 04:37)  T(F): 98.2 (09 Jan 2020 14:02), Max: 98.4 (09 Jan 2020 04:37)  HR: 86 (09 Jan 2020 14:02) (85 - 86)  BP: 104/59 (09 Jan 2020 14:02) (103/59 - 120/64)  BP(mean): --  RR: 18 (09 Jan 2020 14:02) (18 - 18)  SpO2: 94% (09 Jan 2020 14:02) (94% - 98%)    PHYSICAL EXAMINATION:  General: Jaundice, Alert and Awake, NAD  HEENT: scleral icterus, PERRL, EOMI  Neck: Supple  Cardiac: RRR, No M/R/G  Resp: CTAB, No Wh/Rh/Ra  Abdomen: NBS, NT/ND, No HSM, No rigidity or guarding  MSK: Left lateral thigh hematoma with minimal tenderness to palpation, No LE edema. No Calf tenderness  : No bob  Skin: No rashes or lesions. Skin is warm and dry to the touch.   Neuro: Alert and Awake. CN 2-12 Grossly intact. Moves all four extremities spontaneously.  Psych: Calm, Pleasant, Cooperative                          7.9    6.56  )-----------( 87       ( 09 Jan 2020 09:54 )             24.1       01-09    133<L>  |  99  |  16  ----------------------------<  120<H>  3.7   |  26  |  1.09    Ca    8.4      09 Jan 2020 07:07    TPro  5.3<L>  /  Alb  2.3<L>  /  TBili  8.2<H>  /  DBili  x   /  AST  21  /  ALT  14  /  AlkPhos  134<H>  01-09          MICROBIOLOGY:  v  .Urine Clean Catch (Midstream)  01-05-20   >100,000 CFU/ml Escherichia coli ESBL  --  Escherichia coli ESBL      .Blood Blood-Peripheral  01-05-20   No growth to date.  --  --      .Urine Clean Catch (Midstream)  12-10-19   >100,000 CFU/ml Escherichia coli ESBL  >100,000 CFU/ml Klebsiella pneumoniae  --  Escherichia coli ESBL  Klebsiella pneumoniae        CMV IgG Antibody: <0.20 U/mL (12-04-19 @ 08:33)  Toxoplasma IgG Screen: <3.0 IU/mL (12-04-19 @ 08:33)    RADIOLOGY:    EXAM:  XR CHEST PORTABLE URGENT 1V                        PROCEDURE DATE:  01/04/2020    Heart is magnified by technique.  The lungs are clear. No pleural effusion or pneumothorax  No acute bony findings.

## 2020-01-09 NOTE — PROGRESS NOTE ADULT - PROBLEM SELECTOR PLAN 5
iss   - Lantus 28u qHS and lipro 8u qAC
- c/w PPI as above
iss   - Lantus 28u qHS and lipro 8u qAC

## 2020-01-09 NOTE — PROGRESS NOTE ADULT - PROBLEM SELECTOR PROBLEM 4
Cirrhosis
GERD with esophagitis

## 2020-01-09 NOTE — PROGRESS NOTE ADULT - PROBLEM SELECTOR PROBLEM 6
Heart failure with preserved ejection fraction
Diabetes
Heart failure with preserved ejection fraction

## 2020-01-09 NOTE — PROGRESS NOTE ADULT - PROBLEM SELECTOR PLAN 7
thrombocytopenia 2/2 cirrhosis.  - stable. continue to monitor
Hx of HFpEF.  - fluid management and diuretics as above
thrombocytopenia 2/2 cirrhosis.  - stable. continue to monitor
thrombocytopenia 2/2 cirrhosis.  - stable. continue to monitor
thrombocytopenia 2/2 cirrhosis.  - stable. continue to monitor  left lower ext edema- doppler to r/o dvt

## 2020-01-09 NOTE — PROGRESS NOTE ADULT - PROBLEM SELECTOR PROBLEM 7
Thrombocytopenia
Heart failure with preserved ejection fraction
Thrombocytopenia

## 2020-01-09 NOTE — PROGRESS NOTE ADULT - PROBLEM SELECTOR PROBLEM 8
Prophylactic measure
Thrombocytopenia

## 2020-01-09 NOTE — PROGRESS NOTE ADULT - PROBLEM SELECTOR PLAN 4
- c/w PPI as above
Cirrhosis 2/2 JEAN c/b hepatic encephalopathy and SBP in the past.  - On transplant list  - last EUS on 5/3/2019 w/o esophageal varices.  hepatology recommending vascular eval of lower ext sec to dec pulses   - hepatology consult   - c/w lactulose and rifaximin.  - Strict I/O, daily weight.  - hypervolemic on exam: consider diuretics after fluid status assessment post transfusion.
- c/w PPI as above

## 2020-01-09 NOTE — PROGRESS NOTE ADULT - SUBJECTIVE AND OBJECTIVE BOX
SUBJECTIVE / OVERNIGHT EVENTS: pt seen and examined    MEDICATIONS  (STANDING):  dextrose 5%. 1000 milliLiter(s) (50 mL/Hr) IV Continuous <Continuous>  dextrose 50% Injectable 12.5 Gram(s) IV Push once  dextrose 50% Injectable 25 Gram(s) IV Push once  dextrose 50% Injectable 25 Gram(s) IV Push once  ferrous    sulfate 325 milliGRAM(s) Oral daily  folic acid 1 milliGRAM(s) Oral daily  furosemide    Tablet 40 milliGRAM(s) Oral daily  insulin glargine Injectable (LANTUS) 24 Unit(s) SubCutaneous at bedtime  insulin lispro (HumaLOG) corrective regimen sliding scale   SubCutaneous three times a day before meals  insulin lispro (HumaLOG) corrective regimen sliding scale   SubCutaneous at bedtime  insulin lispro Injectable (HumaLOG) 8 Unit(s) SubCutaneous three times a day before meals  lactulose Syrup 20 Gram(s) Oral three times a day  midodrine. 20 milliGRAM(s) Oral three times a day  pantoprazole    Tablet 40 milliGRAM(s) Oral two times a day  rifAXIMin 550 milliGRAM(s) Oral two times a day  spironolactone 50 milliGRAM(s) Oral daily  tamsulosin 0.4 milliGRAM(s) Oral at bedtime  trimethoprim  160 mG/sulfamethoxazole 800 mG 1 Tablet(s) Oral two times a day    MEDICATIONS  (PRN):  dextrose 40% Gel 15 Gram(s) Oral once PRN Blood Glucose LESS THAN 70 milliGRAM(s)/deciliter  glucagon  Injectable 1 milliGRAM(s) IntraMuscular once PRN Glucose LESS THAN 70 milligrams/deciliter    Vital Signs Last 24 Hrs  T(C): 36.6 (10 Ehsan 2020 00:20), Max: 36.9 (2020 04:37)  T(F): 97.9 (10 Ehsan 2020 00:20), Max: 98.4 (2020 04:37)  HR: 85 (10 Ehsan 2020 00:20) (81 - 88)  BP: 103/56 (10 Ehsan 2020 00:20) (103/56 - 134/63)  BP(mean): --  RR: 18 (10 Ehsan 2020 00:20) (18 - 18)  SpO2: 96% (10 Ehsan 2020 00:20) (94% - 98%)    Constitutional: No fever, fatigue  Skin: No rash.  Eyes: No recent vision problems or eye pain.  ENT: No congestion, ear pain, or sore throat.  Cardiovascular: No chest pain or palpation.  Respiratory: No cough, shortness of breath, congestion, or wheezing.  Gastrointestinal: No abdominal pain, nausea, vomiting, or diarrhea.  Genitourinary: No dysuria.  Musculoskeletal: No joint swelling.  Neurologic: No headache.    PHYSICAL EXAM:  GENERAL: NAD  EYES: EOMI, PERRLA  NECK: Supple, No JVD  CHEST/LUNG: dec breath sounds rt base  HEART:  S1 , S2 +  ABDOMEN: soft , bs+, mild distension+  EXTREMITIES: left > rt edema+  NEUROLOGY: alert awake oriented x place, person    LABS:      133<L>  |  99  |  16  ----------------------------<  120<H>  3.7   |  26  |  1.09    Ca    8.4      2020 07:07    TPro  5.3<L>  /  Alb  2.3<L>  /  TBili  8.2<H>  /  DBili      /  AST  21  /  ALT  14  /  AlkPhos  134<H>      Creatinine Trend: 1.09 <--, 1.05 <--, 1.16 <--, 1.12 <--, 1.06 <--, 1.07 <--, 1.17 <--                        7.9    6.56  )-----------( 87       ( 2020 09:54 )             24.1     Urine Studies:  Urinalysis Basic - ( 2020 07:26 )    Color: Dark Yellow / Appearance: Slightly Turbid / S.025 / pH:   Gluc:  / Ketone: Trace  / Bili: Small / Urobili: 6 mg/dL   Blood:  / Protein: 30 mg/dL / Nitrite: Negative   Leuk Esterase: Large / RBC: 8 /hpf /  /HPF   Sq Epi:  / Non Sq Epi: 0 /hpf / Bacteria: Many              LIVER FUNCTIONS - ( 2020 07:07 )  Alb: 2.3 g/dL / Pro: 5.3 g/dL / ALK PHOS: 134 U/L / ALT: 14 U/L / AST: 21 U/L / GGT: x           PT/INR - ( 2020 09:06 )   PT: 23.4 sec;   INR: 2.01 ratio                       LIVER FUNCTIONS - ( 2020 06:38 )  Alb: 2.2 g/dL / Pro: 5.0 g/dL / ALK PHOS: 127 U/L / ALT: 13 U/L / AST: 18 U/L / GGT: x           PT/INR - ( 2020 08:36 )   PT: 23.0 sec;   INR: 1.96 ratio                 LIVER FUNCTIONS - ( 2020 07:13 )  Alb: 2.3 g/dL / Pro: 5.4 g/dL / ALK PHOS: 125 U/L / ALT: 11 U/L / AST: 21 U/L / GGT: x           PT/INR - ( 2020 08:53 )   PT: 23.2 sec;   INR: 2.01 ratio                     LIVER FUNCTIONS - ( 2020 06:45 )  Alb: 2.5 g/dL / Pro: 5.5 g/dL / ALK PHOS: 111 U/L / ALT: 13 U/L / AST: 23 U/L / GGT: x           PT/INR - ( 2020 08:10 )   PT: 25.2 sec;   INR: 2.16 ratio

## 2020-01-09 NOTE — PROGRESS NOTE ADULT - PROBLEM SELECTOR PLAN 6
Hx of HFpEF.  - fluid management and diuretics as above
iss   - Lantus 28u qHS and lipro 8u qAC

## 2020-01-09 NOTE — PROGRESS NOTE ADULT - SUBJECTIVE AND OBJECTIVE BOX
INTERVAL HPI/OVERNIGHT EVENTS:    no overnight events noted   patient seen and examined at bedside  reports feeling well  he denies n/v, abdominal pain   hgb stable    MEDICATIONS  (STANDING):  dextrose 5%. 1000 milliLiter(s) (50 mL/Hr) IV Continuous <Continuous>  dextrose 50% Injectable 12.5 Gram(s) IV Push once  dextrose 50% Injectable 25 Gram(s) IV Push once  dextrose 50% Injectable 25 Gram(s) IV Push once  ferrous    sulfate 325 milliGRAM(s) Oral daily  folic acid 1 milliGRAM(s) Oral daily  furosemide    Tablet 40 milliGRAM(s) Oral daily  insulin glargine Injectable (LANTUS) 24 Unit(s) SubCutaneous at bedtime  insulin lispro (HumaLOG) corrective regimen sliding scale   SubCutaneous three times a day before meals  insulin lispro (HumaLOG) corrective regimen sliding scale   SubCutaneous at bedtime  insulin lispro Injectable (HumaLOG) 8 Unit(s) SubCutaneous three times a day before meals  lactulose Syrup 20 Gram(s) Oral three times a day  midodrine. 20 milliGRAM(s) Oral three times a day  pantoprazole    Tablet 40 milliGRAM(s) Oral two times a day  rifAXIMin 550 milliGRAM(s) Oral two times a day  spironolactone 50 milliGRAM(s) Oral daily  tamsulosin 0.4 milliGRAM(s) Oral at bedtime  trimethoprim  160 mG/sulfamethoxazole 800 mG 1 Tablet(s) Oral two times a day    MEDICATIONS  (PRN):  dextrose 40% Gel 15 Gram(s) Oral once PRN Blood Glucose LESS THAN 70 milliGRAM(s)/deciliter  glucagon  Injectable 1 milliGRAM(s) IntraMuscular once PRN Glucose LESS THAN 70 milligrams/deciliter      Allergies    codeine (Anaphylaxis)    Intolerances        Review of Systems:    General:  No wt loss, fevers, chills, night sweats,fatigue,   Eyes:  Good vision, no reported pain  ENT:  No sore throat, pain, runny nose, dysphagia  CV:  No pain, palpitations, hypo/hypertension  Resp:  No dyspnea, cough, tachypnea, wheezing  GI:  No pain, No nausea, No vomiting, No diarrhea, No constipation, No weight loss, No fever, No pruritis, No rectal bleeding, No melena, No dysphagia  :  No pain, bleeding, incontinence, nocturia  Muscle:  No pain, weakness  Neuro:  No weakness, tingling, memory problems  Psych:  No fatigue, insomnia, mood problems, depression  Endocrine:  No polyuria, polydypsia, cold/heat intolerance  Heme:  No petechiae, ecchymosis, easy bruisability  Skin:  No rash, tattoos, scars, edema      Vital Signs Last 24 Hrs  T(C): 36.9 (09 Jan 2020 04:37), Max: 36.9 (09 Jan 2020 04:37)  T(F): 98.4 (09 Jan 2020 04:37), Max: 98.4 (09 Jan 2020 04:37)  HR: 85 (09 Jan 2020 04:37) (85 - 86)  BP: 103/59 (09 Jan 2020 04:37) (103/59 - 120/64)  BP(mean): --  RR: 18 (09 Jan 2020 04:37) (18 - 18)  SpO2: 97% (09 Jan 2020 04:37) (97% - 98%)    PHYSICAL EXAM:    Constitutional: NAD, well-developed  HEENT: EOMI, throat clear  Neck: No LAD, supple  Respiratory: CTA and P  Cardiovascular: S1 and S2, RRR, no M  Gastrointestinal: BS+, soft, NT/ND, neg HSM,  Extremities: No peripheral edema, neg clubing, cyanosis  Vascular: 2+ peripheral pulses  Neurological: A/O x 3, no focal deficits  Psychiatric: Normal mood, normal affect  Skin: No rashes      LABS:                        7.9    6.56  )-----------( 87       ( 09 Jan 2020 09:54 )             24.1     01-09    133<L>  |  99  |  16  ----------------------------<  120<H>  3.7   |  26  |  1.09    Ca    8.4      09 Jan 2020 07:07    TPro  5.3<L>  /  Alb  2.3<L>  /  TBili  8.2<H>  /  DBili  x   /  AST  21  /  ALT  14  /  AlkPhos  134<H>  01-09    PT/INR - ( 09 Jan 2020 09:06 )   PT: 23.4 sec;   INR: 2.01 ratio               RADIOLOGY & ADDITIONAL TESTS:

## 2020-01-09 NOTE — PROGRESS NOTE ADULT - SUBJECTIVE AND OBJECTIVE BOX
Chief Complaint: Left thigh swelling    Interval Events:   - The patient has no complaints this morning. He denies fevers/chills and left thigh/leg pain.    Allergies:  codeine (Anaphylaxis)  NO  RED MEAT (Unknown)    MEDICATIONS  (STANDING):  dextrose 5%. 1000 milliLiter(s) (50 mL/Hr) IV Continuous <Continuous>  dextrose 50% Injectable 12.5 Gram(s) IV Push once  dextrose 50% Injectable 25 Gram(s) IV Push once  dextrose 50% Injectable 25 Gram(s) IV Push once  ferrous    sulfate 325 milliGRAM(s) Oral daily  folic acid 1 milliGRAM(s) Oral daily  furosemide    Tablet 40 milliGRAM(s) Oral daily  insulin glargine Injectable (LANTUS) 24 Unit(s) SubCutaneous at bedtime  insulin lispro (HumaLOG) corrective regimen sliding scale   SubCutaneous three times a day before meals  insulin lispro (HumaLOG) corrective regimen sliding scale   SubCutaneous at bedtime  insulin lispro Injectable (HumaLOG) 8 Unit(s) SubCutaneous three times a day before meals  lactulose Syrup 20 Gram(s) Oral three times a day  midodrine. 20 milliGRAM(s) Oral three times a day  pantoprazole    Tablet 40 milliGRAM(s) Oral two times a day  rifAXIMin 550 milliGRAM(s) Oral two times a day  spironolactone 50 milliGRAM(s) Oral daily  tamsulosin 0.4 milliGRAM(s) Oral at bedtime  trimethoprim  160 mG/sulfamethoxazole 800 mG 1 Tablet(s) Oral two times a day    MEDICATIONS  (PRN):  dextrose 40% Gel 15 Gram(s) Oral once PRN Blood Glucose LESS THAN 70 milliGRAM(s)/deciliter  glucagon  Injectable 1 milliGRAM(s) IntraMuscular once PRN Glucose LESS THAN 70 milligrams/deciliter    PMHX/PSHX:  GIB (gastrointestinal bleeding)  GERD with esophagitis  Hepatic encephalopathy  Obesity  Fatty liver disease, nonalcoholic  Renal stones  Hypertension  Neuropathy  Hypercholesteremia  Diabetes  S/P cholecystectomy    Family history:  Family history of type 2 diabetes mellitus  Family history of hypertension  Family history of stomach cancer    ROS:     General:  No wt loss, fevers, chills, night sweats, fatigue,   Eyes:  Good vision, no reported pain  ENT:  No sore throat, pain, runny nose, dysphagia  CV:  No pain, palpitations, hypo/hypertension  Pulm:  No dyspnea, cough, tachypnea, wheezing  GI:  No pain, No nausea, No vomiting, No diarrhea, No constipation, No weight loss, No fever, No pruritis, No rectal bleeding, No tarry stools, No dysphagia  :  No pain, bleeding, incontinence, nocturia  Muscle:  No pain, weakness  Neuro:  No weakness, tingling, memory problems  Psych:  No fatigue, insomnia, mood problems, depression  Endocrine:  No polyuria, polydipsia, cold/heat intolerance  Heme:  No petechiae, ecchymosis, easy bruisability  Skin:  No rash, tattoos, scars, edema    PHYSICAL EXAM:   Vital Signs:  Vital Signs Last 24 Hrs  T(C): 36.8 (2020 14:02), Max: 36.9 (2020 04:37)  T(F): 98.2 (2020 14:02), Max: 98.4 (2020 04:37)  HR: 86 (2020 14:02) (85 - 86)  BP: 104/59 (2020 14:02) (103/59 - 120/64)  BP(mean): --  RR: 18 (2020 14:02) (18 - 18)  SpO2: 94% (2020 14:02) (94% - 98%)   Daily Weight in k.6 (2020 06:28)    GENERAL:  No acute distress  HEENT:  Normocephalic/atraumatic, +scleral icterus  CHEST:  Normal effort, no accessory muscle use  ABDOMEN:  Soft, non-tender, non-distended, normoactive bowel sounds  EXTREMITIES:  No cyanosis, clubbing, +edema (LLE>RLE)  SKIN:  No rash/erythema  NEURO:  Alert and oriented x 3, no asterixis    LABS:                        7.9    6.56  )-----------( 87       ( 2020 09:54 )             24.1     01-09    133<L>  |  99  |  16  ----------------------------<  120<H>  3.7   |  26  |  1.09    Ca    8.4      2020 07:07    TPro  5.3<L>  /  Alb  2.3<L>  /  TBili  8.2<H>  /  DBili  x   /  AST  21  /  ALT  14  /  AlkPhos  134<H>  01-09    LIVER FUNCTIONS - ( 2020 07:07 )  Alb: 2.3 g/dL / Pro: 5.3 g/dL / ALK PHOS: 134 U/L / ALT: 14 U/L / AST: 21 U/L / GGT: x           PT/INR - ( 2020 09:06 )   PT: 23.4 sec;   INR: 2.01 ratio      Imaging:    No new imaging

## 2020-01-09 NOTE — PROGRESS NOTE ADULT - PROBLEM SELECTOR PROBLEM 2
Anemia
R/O GIB (gastrointestinal bleeding)
Anemia

## 2020-01-09 NOTE — PROVIDER CONTACT NOTE (FALL NOTIFICATION) - ACTION/TREATMENT ORDERED:
Vital check with orthostatic BP x1. Xray of left knee ordered. Re-educate patient on fall risk precautions.

## 2020-01-09 NOTE — PROVIDER CONTACT NOTE (FALL NOTIFICATION) - SITUATION
RN assisted patient to bathroom with walker and instructed him to pull red cord when finished for assistance. Pt found on the floor after walking from the bathroom back to the bed without assistance.

## 2020-01-09 NOTE — PROGRESS NOTE ADULT - SUBJECTIVE AND OBJECTIVE BOX
[facility]    Sushila Dhaliwal, Doug, Morenita, Ayo, Clay, Colleen      [phone]    CHIEF COMPLAINT: Patient is a 64y old  Male who presents with a chief complaint of low Hgb (08 Jan 2020 18:34)      Follow Up: diast hf, nonobstr cad    Interim history: The patient reports no new symptoms.  Denies chest discomfort and shortness of breath.  No abdominal pain.  No new neurologic symptoms.      MEDICATIONS  (STANDING):  dextrose 5%. 1000 milliLiter(s) (50 mL/Hr) IV Continuous <Continuous>  dextrose 50% Injectable 12.5 Gram(s) IV Push once  dextrose 50% Injectable 25 Gram(s) IV Push once  dextrose 50% Injectable 25 Gram(s) IV Push once  ferrous    sulfate 325 milliGRAM(s) Oral daily  folic acid 1 milliGRAM(s) Oral daily  furosemide    Tablet 40 milliGRAM(s) Oral daily  insulin glargine Injectable (LANTUS) 24 Unit(s) SubCutaneous at bedtime  insulin lispro (HumaLOG) corrective regimen sliding scale   SubCutaneous three times a day before meals  insulin lispro (HumaLOG) corrective regimen sliding scale   SubCutaneous at bedtime  insulin lispro Injectable (HumaLOG) 8 Unit(s) SubCutaneous three times a day before meals  lactulose Syrup 20 Gram(s) Oral three times a day  midodrine. 20 milliGRAM(s) Oral three times a day  pantoprazole    Tablet 40 milliGRAM(s) Oral two times a day  rifAXIMin 550 milliGRAM(s) Oral two times a day  spironolactone 50 milliGRAM(s) Oral daily  tamsulosin 0.4 milliGRAM(s) Oral at bedtime  trimethoprim  160 mG/sulfamethoxazole 800 mG 1 Tablet(s) Oral two times a day    MEDICATIONS  (PRN):  dextrose 40% Gel 15 Gram(s) Oral once PRN Blood Glucose LESS THAN 70 milliGRAM(s)/deciliter  glucagon  Injectable 1 milliGRAM(s) IntraMuscular once PRN Glucose LESS THAN 70 milligrams/deciliter      REVIEW OF SYSTEMS:  eye, ent, GI, , allergic, dermatologic, musculoskeletal and neurologic are negative except as described above    Vital Signs Last 24 Hrs  T(C): 36.9 (09 Jan 2020 04:37), Max: 36.9 (08 Jan 2020 11:57)  T(F): 98.4 (09 Jan 2020 04:37), Max: 98.5 (08 Jan 2020 11:57)  HR: 85 (09 Jan 2020 04:37) (79 - 86)  BP: 103/59 (09 Jan 2020 04:37) (103/59 - 120/64)  BP(mean): --  RR: 18 (09 Jan 2020 04:37) (18 - 18)  SpO2: 97% (09 Jan 2020 04:37) (97% - 98%)    I&O's Summary    08 Jan 2020 07:01  -  09 Jan 2020 07:00  --------------------------------------------------------  IN: 960 mL / OUT: 1800 mL / NET: -840 mL    09 Jan 2020 07:01  -  09 Jan 2020 09:54  --------------------------------------------------------  IN: 0 mL / OUT: 500 mL / NET: -500 mL        Telemetry past 24h:    PHYSICAL EXAM:    Constitutional: well-nourished, well-developed, NAD   HEENT:  MMM, sclerae anicteric, conjunctivae clear, no oral cyanosis.  Pulmonary: Non-labored, breath sounds are clear bilaterally, No wheezing, rales or rhonchi  Cardiovascular: Regular, S1 and S2.  No murmur.  No rubs, gallops or clicks  Gastrointestinal: Bowel Sounds present, soft, nontender.   Lymph: mod peripheral edema.   Neurological: Alert, no focal deficits  Skin: No rashes.  Psych:  Mood & affect appropriate    LABS: All Labs Reviewed:                        7.7    8.40  )-----------( 87       ( 08 Jan 2020 09:05 )             23.1                         8.0    12.39 )-----------( 81       ( 07 Jan 2020 09:41 )             23.4     09 Jan 2020 07:07    133    |  99     |  16     ----------------------------<  120    3.7     |  26     |  1.09   08 Jan 2020 06:38    135    |  100    |  15     ----------------------------<  143    4.0     |  22     |  1.05   07 Jan 2020 07:13    131    |  98     |  18     ----------------------------<  140    3.4     |  24     |  1.16     Ca    8.4        09 Jan 2020 07:07  Ca    8.1        08 Jan 2020 06:38  Ca    8.3        07 Jan 2020 07:13    TPro  5.3    /  Alb  2.3    /  TBili  8.2    /  DBili  x      /  AST  21     /  ALT  14     /  AlkPhos  134    09 Jan 2020 07:07  TPro  5.0    /  Alb  2.2    /  TBili  8.1    /  DBili  x      /  AST  18     /  ALT  13     /  AlkPhos  127    08 Jan 2020 06:38  TPro  5.4    /  Alb  2.3    /  TBili  9.8    /  DBili  x      /  AST  21     /  ALT  11     /  AlkPhos  125    07 Jan 2020 07:13    PT/INR - ( 09 Jan 2020 09:06 )   PT: 23.4 sec;   INR: 2.01 ratio               Blood Culture: Organism Escherichia coli ESBL  Gram Stain Blood -- Gram Stain --  Specimen Source .Urine Clean Catch (Midstream)  Culture-Blood --    Organism --  Gram Stain Blood -- Gram Stain --  Specimen Source .Blood Blood-Peripheral  Culture-Blood --            RADIOLOGY:    EKG:    Echo:

## 2020-01-09 NOTE — PROGRESS NOTE ADULT - PROBLEM SELECTOR PROBLEM 5
Diabetes
GERD with esophagitis
Diabetes

## 2020-01-09 NOTE — PROGRESS NOTE ADULT - PROBLEM SELECTOR PLAN 1
Cirrhotic patient with recurrent Hgb drop.   monitor hb closely
Cirrhotic patient with recurrent Hgb drop.   monitor hb closely
Cirrhotic patient with recurrent Hgb drop.   monitor hb closely    - varices: grade I <5mm varices on EGD 12/30 not amenable to banding; also mild GAVE s/p APC
if fever recurs pancx, ua, urine cx , cxr , start pt on meropenem empirically   ID eval
if fever recurs pancx, ua, urine cx , cxr , start pt on meropenem empirically   ID evaluated pt
if fever recurs pancx, ua, urine cx , cxr , start pt on meropenem empirically   ID evaluated pt- started on abx , f/u cx
Cirrhotic patient with recurrent Hgb drop.   monitor hb closely

## 2020-01-09 NOTE — PROGRESS NOTE ADULT - PROBLEM SELECTOR PROBLEM 1
Fever
R/O GIB (gastrointestinal bleeding)

## 2020-01-09 NOTE — PROGRESS NOTE ADULT - ASSESSMENT
63 y/o M PMHx of IDDMII, HfpEF, decompensated JEAN cirrhosis, hepatic encephalopathy, previous episode of SBP, previous GBS bacteremia and ESBL UTI with left thigh hematoma and now leukocytosis and fever.    Recent CT A/P with minimal ascites  I suspect that leukocytosis and fever is reactive from hematoma vs. UTI  Lower suspicion for superinfected hematoma given physical examination (no tenderness to palpation at this time)  BCx NGTD  U/A with pyuria and UCx with > 100K ESBL Ecoli     E coli susceptible to bactrim. Would switch mari to bactrim.     #Fever / Leukocytosis / E coli UTI  --Continue Bactrim DS PO BID (End Date: 1/10)  --Continue to follow CBC with diff  --Continue to follow temperature curve  --Follow up on preliminary blood cultures    #Pre-LT Evaluation  --No absolute ID contraindications for liver transplant  --For danilo-operative antibiotics will require Meropenem and Vancomycin x1    #Encounter to Vaccinate Patient  --Influenza 12/17/19  --PCV13 12/16/19 (Due for PPSV23 in February)  --Will require Hepatitis B vaccination (will address after discharge)    Followup: recommend follow up with me in the Infectious Diseases Office in ~1 month time for completion of vaccine. Patient was provided with my office contact number of [phone]    I will sign off at this time. Please feel free to contact me with any further questions or concerns.    Eusebio Pérez M.D.  [facility] Division of Infectious Disease  8AM-5PM: Pager Number [phone]  After Hours (or if no response): Please contact the Infectious Diseases Office at [phone]

## 2020-01-09 NOTE — CHART NOTE - NSCHARTNOTEFT_GEN_A_CORE
Fall    Notified that patient fell when coming out of bathroom unassisted. Per staff and patient account, patient was assisted to the bathroom with walker, when down he walked out of the bathroom alone with the walker placed it against the wall across from his bed and held on to the foot railing of the bed and slipped due to his socks. Patient states that he landed on his left knee and was unable to get up unassisted. States that he did not loose consciousness and did not hit his head. Denies chest pain, sob, HA, palpitations, dizziness, lightheadedness, N/V    Vital Signs Last 24 Hrs  T(C): 36.7 (09 Jan 2020 20:06), Max: 36.9 (09 Jan 2020 04:37)  T(F): 98.1 (09 Jan 2020 20:06), Max: 98.4 (09 Jan 2020 04:37)  HR: 88 (09 Jan 2020 20:06) (85 - 88)  BP: 134/63 (09 Jan 2020 20:06) (103/59 - 134/63)  BP(mean): --  RR: 18 (09 Jan 2020 20:06) (18 - 18)  SpO2: 98% (09 Jan 2020 20:06) (94% - 98%)    Physical exam  Please see Interdisciplinary post fall assessment in physical chart (ATTENDING TO SIGN)    Plan  xray of left knee as patient stated he landed on his left knee  fall precautions  safety precautions  chair alarm  bed alarm    Dr Prado and Wife Herson Segundo made aware of the above    Will endorse to primary day team, attending to follow    Loan Coronado NP  spectralink [phone]

## 2020-01-09 NOTE — PROGRESS NOTE ADULT - PROBLEM SELECTOR PROBLEM 3
Anemia
Cirrhosis

## 2020-01-09 NOTE — PROGRESS NOTE ADULT - ASSESSMENT
Impression:  65 yo M with IDDM, dyslipidemia, obesity, GERD, HFpEF with mild LV diastolic dysfunction, and decompensated JEAN cirrhosis complicated by ascites, history of SBP, hepatic encephalopathy, and chronic anemia with a history of duodenal ulcer as well as GAVE and duodenal AVM s/p APC (last on 10/11/19), who is UNOS listed for liver transplantation at [facility]. He was re-admitted from subacute rehab on 12/26/19 due to acute on chronic anemia without overt GI bleeding (found to have LLE hematoma), also with hepatic encephalopathy.    # Leukocytosis and low grade fever: Found to have ESBL UTI. Minimal concern for infected left thigh hematoma given no erythema, induration, or warmth on exam. Transplant ID following. On meropenem x 3 days, transitioned to Bactrim to complete antibiotic course on 1/10  # Left leg swelling and pain      - Large 14cm left thigh hematoma  #Acute on chronic anemia     - acute component likely secondary to thigh hematoma above     - no active bleeding seen on CTA on 1/3/20      - s/p EGD 12/30 with mild GAVE s/p APC treatment  # Decompensated JEAN cirrhosis, MELD-Na 25 on 1/9/20     - varices: grade I <5mm varices on EGD 12/30 not amenable to banding; also mild GAVE s/p APC      - SPE: AAO x 3, no asterixis. On lactulose and Xifaxan     - ascites: trace, not on diuretics     - HCC: none on CT this admission     - Transplant: Blood Type A positive, UNOS listed with updated MELD-Na of 27    Recommendations:  - No contraindication to discharge from Transplant Hepatology   - F/U Transplant ID recommendations   - F/U blood and urine cultures   - daily CBC, CMP, INR  - monitor exam for size of hematoma and s/sx of compartment syndrome  - PPI PO BID for GAVE  - Continue furosemide at 40 mg PO daily  - 1.5L fluid restriction  - Low salt diet  - Continue Glucerna TID with meals given low albumin  - Lactulose TID and Xifaxan BID for HE     Please call Hepatology ([phone]) if there are any additional questions or concerns. Please call on-call GI fellow after 5pm and before 8am, and on weekends.

## 2020-01-09 NOTE — PROGRESS NOTE ADULT - PROBLEM SELECTOR PLAN 8
-
thrombocytopenia 2/2 cirrhosis.  - stable. continue to monitor  left lower ext edema- doppler to r/o dvt

## 2020-01-09 NOTE — PROGRESS NOTE ADULT - ASSESSMENT
63 year old male with HTN, DM2, non-alcoholic cirrhosis, who was recently admitted with altered mental status, hyponatremia and high ammonia level. We last saw him during a hospitalization in 10/2019, during which he was significantly volume overloaded and required iv diuresis.  He is currently being evaluated for liver transplant.  He returns last week with acute blood loss anemia. CT with trace pleural effusions, anasarca, and atherosclerotic calcifications. He has a left thigh hematoma.  EGD yesterday with mild gave s/p APC treatment    Abnormal EKG  - His EKGs have demonstrated prolonged qtc in the past  - repeat ekg shows normal QT  - avoid qtc prolonging medications and replete K to greater than 4 and mg > 2     Diastolic HF   - CXR is clear and CT with only trace effusions. Though still with lower ext edema  - he does have some le edema in the setting of his hypoalbuminemia and diastolic dysfunction.  Now on Lasix and spironolactone.    - TTE normal LV systolic function EF 65%; LVH, DD Grade I  - cath with normal filling pressures and no significant cad.  - no asa in setting of acute blood loss anemia.    HTN  - BP on softer side  - hold anti-hypertensives except for diuretics.   - cont midodrine      Anemia  - transfuse to keep Hb >8. trend H/H  - GI follow up appreciated  - monitor thigh hematoma.    - All other workup and dc planning as per primary team

## 2020-01-09 NOTE — PROGRESS NOTE ADULT - PROBLEM SELECTOR PLAN 3
Cirrhosis 2/2 JEAN c/b hepatic encephalopathy and SBP in the past.  - On transplant list  - last EUS on 5/3/2019 w/o esophageal varices.  - hepatology consult   - c/w lactulose and rifaximin.  - Strict I/O, daily weight.  - hypervolemic on exam: consider diuretics after fluid status assessment post transfusion.
Cirrhosis 2/2 JEAN c/b hepatic encephalopathy and SBP in the past.  - On transplant list  - last EUS on 5/3/2019 w/o esophageal varices.  hepatology recommending vascular eval of lower ext sec to dec pulses   - hepatology consult   - c/w lactulose and rifaximin.  - Strict I/O, daily weight.  - hypervolemic on exam: consider diuretics after fluid status assessment post transfusion.
Cirrhosis 2/2 JEAN c/b hepatic encephalopathy and SBP in the past.  - On transplant list  - last EUS on 5/3/2019 w/o esophageal varices.  hepatology recommending vascular eval of lower ext sec to dec pulses   - hepatology consult   - c/w lactulose and rifaximin.  - Strict I/O, daily weight.  - hypervolemic on exam: consider diuretics after fluid status assessment post transfusion.
f/u hb closely   transfuse prn
Cirrhosis 2/2 JEAN c/b hepatic encephalopathy and SBP in the past.  - On transplant list  - last EUS on 5/3/2019 w/o esophageal varices.  - hepatology consult   - c/w lactulose and rifaximin.  - Strict I/O, daily weight.  - hypervolemic on exam: consider diuretics after fluid status assessment post transfusion.

## 2020-01-10 ENCOUNTER — TRANSCRIPTION ENCOUNTER (OUTPATIENT)
Age: 65
End: 2020-01-10

## 2020-01-10 VITALS
TEMPERATURE: 98 F | RESPIRATION RATE: 189 BRPM | DIASTOLIC BLOOD PRESSURE: 55 MMHG | SYSTOLIC BLOOD PRESSURE: 118 MMHG | HEART RATE: 82 BPM

## 2020-01-10 LAB
ALBUMIN SERPL ELPH-MCNC: 2.2 G/DL — LOW (ref 3.3–5)
ALP SERPL-CCNC: 137 U/L — HIGH (ref 40–120)
ALT FLD-CCNC: 17 U/L — SIGNIFICANT CHANGE UP (ref 10–45)
ANION GAP SERPL CALC-SCNC: 8 MMOL/L — SIGNIFICANT CHANGE UP (ref 5–17)
APTT BLD: 40 SEC — HIGH (ref 27.5–36.3)
AST SERPL-CCNC: 25 U/L — SIGNIFICANT CHANGE UP (ref 10–40)
BILIRUB SERPL-MCNC: 7.4 MG/DL — HIGH (ref 0.2–1.2)
BUN SERPL-MCNC: 16 MG/DL — SIGNIFICANT CHANGE UP (ref 7–23)
CALCIUM SERPL-MCNC: 8 MG/DL — LOW (ref 8.4–10.5)
CHLORIDE SERPL-SCNC: 98 MMOL/L — SIGNIFICANT CHANGE UP (ref 96–108)
CO2 SERPL-SCNC: 25 MMOL/L — SIGNIFICANT CHANGE UP (ref 22–31)
CREAT SERPL-MCNC: 1.13 MG/DL — SIGNIFICANT CHANGE UP (ref 0.5–1.3)
CULTURE RESULTS: SIGNIFICANT CHANGE UP
CULTURE RESULTS: SIGNIFICANT CHANGE UP
GLUCOSE BLDC GLUCOMTR-MCNC: 166 MG/DL — HIGH (ref 70–99)
GLUCOSE BLDC GLUCOMTR-MCNC: 168 MG/DL — HIGH (ref 70–99)
GLUCOSE SERPL-MCNC: 190 MG/DL — HIGH (ref 70–99)
HCT VFR BLD CALC: 23.6 % — LOW (ref 39–50)
HGB BLD-MCNC: 7.7 G/DL — LOW (ref 13–17)
INR BLD: 1.98 RATIO — HIGH (ref 0.88–1.16)
MCHC RBC-ENTMCNC: 32.6 GM/DL — SIGNIFICANT CHANGE UP (ref 32–36)
MCHC RBC-ENTMCNC: 34.1 PG — HIGH (ref 27–34)
MCV RBC AUTO: 104.4 FL — HIGH (ref 80–100)
PLATELET # BLD AUTO: 92 K/UL — LOW (ref 150–400)
POTASSIUM SERPL-MCNC: 3.9 MMOL/L — SIGNIFICANT CHANGE UP (ref 3.5–5.3)
POTASSIUM SERPL-SCNC: 3.9 MMOL/L — SIGNIFICANT CHANGE UP (ref 3.5–5.3)
PROT SERPL-MCNC: 5.3 G/DL — LOW (ref 6–8.3)
PROTHROM AB SERPL-ACNC: 22.8 SEC — HIGH (ref 10–13.1)
RBC # BLD: 2.26 M/UL — LOW (ref 4.2–5.8)
RBC # FLD: 23.2 % — HIGH (ref 10.3–14.5)
SODIUM SERPL-SCNC: 131 MMOL/L — LOW (ref 135–145)
SPECIMEN SOURCE: SIGNIFICANT CHANGE UP
SPECIMEN SOURCE: SIGNIFICANT CHANGE UP
WBC # BLD: 7.87 K/UL — SIGNIFICANT CHANGE UP (ref 3.8–10.5)
WBC # FLD AUTO: 7.87 K/UL — SIGNIFICANT CHANGE UP (ref 3.8–10.5)

## 2020-01-10 PROCEDURE — 82525 ASSAY OF COPPER: CPT

## 2020-01-10 PROCEDURE — 71045 X-RAY EXAM CHEST 1 VIEW: CPT

## 2020-01-10 PROCEDURE — 85610 PROTHROMBIN TIME: CPT

## 2020-01-10 PROCEDURE — 83540 ASSAY OF IRON: CPT

## 2020-01-10 PROCEDURE — 99285 EMERGENCY DEPT VISIT HI MDM: CPT | Mod: 25

## 2020-01-10 PROCEDURE — 73564 X-RAY EXAM KNEE 4 OR MORE: CPT

## 2020-01-10 PROCEDURE — 97116 GAIT TRAINING THERAPY: CPT

## 2020-01-10 PROCEDURE — 86850 RBC ANTIBODY SCREEN: CPT

## 2020-01-10 PROCEDURE — 36430 TRANSFUSION BLD/BLD COMPNT: CPT

## 2020-01-10 PROCEDURE — 81001 URINALYSIS AUTO W/SCOPE: CPT

## 2020-01-10 PROCEDURE — 82746 ASSAY OF FOLIC ACID SERUM: CPT

## 2020-01-10 PROCEDURE — 87186 SC STD MICRODIL/AGAR DIL: CPT

## 2020-01-10 PROCEDURE — 87040 BLOOD CULTURE FOR BACTERIA: CPT

## 2020-01-10 PROCEDURE — 73706 CT ANGIO LWR EXTR W/O&W/DYE: CPT

## 2020-01-10 PROCEDURE — 74176 CT ABD & PELVIS W/O CONTRAST: CPT

## 2020-01-10 PROCEDURE — 83010 ASSAY OF HAPTOGLOBIN QUANT: CPT

## 2020-01-10 PROCEDURE — 99238 HOSP IP/OBS DSCHRG MGMT 30/<: CPT

## 2020-01-10 PROCEDURE — 73700 CT LOWER EXTREMITY W/O DYE: CPT

## 2020-01-10 PROCEDURE — 85045 AUTOMATED RETICULOCYTE COUNT: CPT

## 2020-01-10 PROCEDURE — 82272 OCCULT BLD FECES 1-3 TESTS: CPT

## 2020-01-10 PROCEDURE — 86900 BLOOD TYPING SEROLOGIC ABO: CPT

## 2020-01-10 PROCEDURE — 82728 ASSAY OF FERRITIN: CPT

## 2020-01-10 PROCEDURE — 97530 THERAPEUTIC ACTIVITIES: CPT

## 2020-01-10 PROCEDURE — 83550 IRON BINDING TEST: CPT

## 2020-01-10 PROCEDURE — 86923 COMPATIBILITY TEST ELECTRIC: CPT

## 2020-01-10 PROCEDURE — 82607 VITAMIN B-12: CPT

## 2020-01-10 PROCEDURE — 86901 BLOOD TYPING SEROLOGIC RH(D): CPT

## 2020-01-10 PROCEDURE — P9016: CPT

## 2020-01-10 PROCEDURE — 73552 X-RAY EXAM OF FEMUR 2/>: CPT

## 2020-01-10 PROCEDURE — 96374 THER/PROPH/DIAG INJ IV PUSH: CPT

## 2020-01-10 PROCEDURE — 97166 OT EVAL MOD COMPLEX 45 MIN: CPT

## 2020-01-10 PROCEDURE — 93971 EXTREMITY STUDY: CPT

## 2020-01-10 PROCEDURE — 85730 THROMBOPLASTIN TIME PARTIAL: CPT

## 2020-01-10 PROCEDURE — 83615 LACTATE (LD) (LDH) ENZYME: CPT

## 2020-01-10 PROCEDURE — 82550 ASSAY OF CK (CPK): CPT

## 2020-01-10 PROCEDURE — 99232 SBSQ HOSP IP/OBS MODERATE 35: CPT

## 2020-01-10 PROCEDURE — 93005 ELECTROCARDIOGRAM TRACING: CPT

## 2020-01-10 PROCEDURE — 97110 THERAPEUTIC EXERCISES: CPT

## 2020-01-10 PROCEDURE — 80053 COMPREHEN METABOLIC PANEL: CPT

## 2020-01-10 PROCEDURE — 87086 URINE CULTURE/COLONY COUNT: CPT

## 2020-01-10 PROCEDURE — 99232 SBSQ HOSP IP/OBS MODERATE 35: CPT | Mod: GC

## 2020-01-10 PROCEDURE — 82140 ASSAY OF AMMONIA: CPT

## 2020-01-10 PROCEDURE — 84100 ASSAY OF PHOSPHORUS: CPT

## 2020-01-10 PROCEDURE — 85027 COMPLETE CBC AUTOMATED: CPT

## 2020-01-10 PROCEDURE — 83735 ASSAY OF MAGNESIUM: CPT

## 2020-01-10 PROCEDURE — 82962 GLUCOSE BLOOD TEST: CPT

## 2020-01-10 PROCEDURE — 97162 PT EVAL MOD COMPLEX 30 MIN: CPT

## 2020-01-10 RX ORDER — FOLIC ACID 0.8 MG
1 TABLET ORAL
Qty: 0 | Refills: 0 | DISCHARGE
Start: 2020-01-10

## 2020-01-10 RX ORDER — SPIRONOLACTONE 25 MG/1
2 TABLET, FILM COATED ORAL
Qty: 0 | Refills: 0 | DISCHARGE
Start: 2020-01-10

## 2020-01-10 RX ORDER — SUCRALFATE 1 G
1 TABLET ORAL
Qty: 0 | Refills: 0 | DISCHARGE

## 2020-01-10 RX ORDER — LACTULOSE 10 G/15ML
30 SOLUTION ORAL
Qty: 0 | Refills: 0 | DISCHARGE
Start: 2020-01-10

## 2020-01-10 RX ORDER — INSULIN GLARGINE 100 [IU]/ML
24 INJECTION, SOLUTION SUBCUTANEOUS
Qty: 0 | Refills: 0 | DISCHARGE
Start: 2020-01-10

## 2020-01-10 RX ORDER — FUROSEMIDE 40 MG
1 TABLET ORAL
Qty: 0 | Refills: 0 | DISCHARGE
Start: 2020-01-10

## 2020-01-10 RX ORDER — TRAMADOL HYDROCHLORIDE 50 MG/1
1 TABLET ORAL
Qty: 0 | Refills: 0 | DISCHARGE

## 2020-01-10 RX ORDER — FERROUS SULFATE 325(65) MG
325 TABLET ORAL
Qty: 1 | Refills: 0
Start: 2020-01-10

## 2020-01-10 RX ORDER — FUROSEMIDE 40 MG
1 TABLET ORAL
Qty: 0 | Refills: 0 | DISCHARGE

## 2020-01-10 RX ADMIN — Medication 1: at 13:15

## 2020-01-10 RX ADMIN — Medication 8 UNIT(S): at 13:15

## 2020-01-10 RX ADMIN — Medication 1 MILLIGRAM(S): at 11:14

## 2020-01-10 RX ADMIN — MIDODRINE HYDROCHLORIDE 20 MILLIGRAM(S): 2.5 TABLET ORAL at 11:14

## 2020-01-10 RX ADMIN — Medication 1: at 09:29

## 2020-01-10 RX ADMIN — LACTULOSE 20 GRAM(S): 10 SOLUTION ORAL at 05:44

## 2020-01-10 RX ADMIN — MIDODRINE HYDROCHLORIDE 20 MILLIGRAM(S): 2.5 TABLET ORAL at 05:44

## 2020-01-10 RX ADMIN — Medication 40 MILLIGRAM(S): at 05:44

## 2020-01-10 RX ADMIN — Medication 8 UNIT(S): at 09:30

## 2020-01-10 RX ADMIN — Medication 325 MILLIGRAM(S): at 11:14

## 2020-01-10 RX ADMIN — PANTOPRAZOLE SODIUM 40 MILLIGRAM(S): 20 TABLET, DELAYED RELEASE ORAL at 05:45

## 2020-01-10 RX ADMIN — SPIRONOLACTONE 50 MILLIGRAM(S): 25 TABLET, FILM COATED ORAL at 05:45

## 2020-01-10 RX ADMIN — Medication 1 TABLET(S): at 05:45

## 2020-01-10 NOTE — PHARMACOTHERAPY INTERVENTION NOTE - COMMENTS
63 yo M with DM A1C 5.8 on lantus 28 units subcutaneously at bedtime, and humalog 8 units subcutaneously three times daily at home. Currently on home regimen with low dose correctional scale, and was controlled since admission. BG in past 24 hours were 87, 183, 186, 128, 125. Recommendation made to decrease lantus dose to 24 units subcutaneously at bedtime.    Luis Cardenas, PharmD, BCPS  [phone]
Patient currently receiving Bactrim 160-800 mg twice daily by mouth for UTI. Per infectious disease recommendations, patient will complete therapy today. Recommended discontinuing bactrim therapy after 6pm dose tonight.    Esteban Laurent  PGY-1 Pharmacy Resident  Pager: [phone]
65 yo M with anemia s/p EGD showing small varices. Recommendation made to d/c ceftriaxone, d/c sucralfate, switch protonix gtt to 40mg PO BID, decrease lactulose 20 QID to TID, add folic acid/iron supplementation (per hepatology).    Luis Cardenas, PharmD, BCPS  [phone]
Currently on meropenem and vancomycin for empiric coverage/leukocytosis with Hx ecoli esbl. UCx shows >100k GNR. Recommend f/u with ID, as the vancomycin might be discontinued.    Luis Cardenas, PharmD, BCPS  [phone]
Currently on pantoprazole gtt and PO Q12H. Recommendation made to discontinue infusion order due to duplication.    Luis Cardenas, PharmD, BCPS  [phone]

## 2020-01-10 NOTE — PROGRESS NOTE ADULT - ATTENDING COMMENTS
Advanced care planning was discussed with patient and family.  Advanced care planning forms were reviewed and discussed.  Risks, benefits and alternatives of gastroenterologic procedures were discussed in detail and all questions were answered.    30 minutes spent.
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. He was re-admitted from subacute rehab on 12/26/19 due to acute on chronic anemia with LLE hematoma, also with hepatic encephalopathy.    # ESBL E. Coli UTI: Urine culture from 1/5/20 with >100k ESBL E. coli. S/p meropenem (1/6- ), with plan to switch to PO Bactrim to complete total 5-day course of treatment (end date 1/10/20 per Transplant ID). Blood cultures (1/5/20) negative. Prior leukocytosis resolved and no further fevers.    # LLE hematoma: Stable, with no expansion based on exam and less pain. Ambulating better with assistance/walker. Hb/HCT also stable.    # Acute on chronic anemia: Secondary to acute LLE hematoma, as well as chronic occult GI blood loss related to known GAVE, PHG, and AVMs, as well as possible spur cell anemia related to his end-stage liver disease. Hb/HCT currently stable.    # Hepatic encephalopathy: Resolved, currently remains at his baseline mental status. Continue rifaximin and lactulose.    # Hypervolemic hyponatremia: Stable, with Na 133 today. Continue 1.5L fluid restriction.    # Anasarca: Improved, but still with some bilateral (L>R) peripheral edema. Continue furosemide 40 mg po daily and spironolactone 50 mg po daily. Renal function currently remains stable and SBPs in 100s-110s though remains on midodrine 20 mg po tid.    # Decompensated JEAN cirrhosis: Listed for liver transplant with MELD-Na 29 (1/6/20), blood type A. Current MELD-Na 25 today.    Please don't hesitate to call with any questions/concerns.    Letitia Mo M.D., Ph.D.  Transplant Hepatology  Cell: [phone]
Patient with JEAN cirrhosis listed for liver transplant now with left leg pain . evaluation shows significant hematoma with no fracture.  Patient with elevated INR, suggest vitamin K supplementation.  encourage patient out of bed with assistance to maintain ambulatory function.  Low salt diet.
Patient with JEAN cirrhosis listed for transplant and now with left leg pain and swelling.  Patient relates this pain has limited his ability to walk.  Await evaluation for thrombosis.  EGD showed GAVE and was teated.  Mentation clear.  Patient not a candidate fro TIPS shunt due to severity of liver disease.  Encourage nutrition .
Patient with decompensated JEAN cirrhosis now in hospital for acute left leg pain.  No DVT on doppler , bu8t hematoma of left leg with swelling and pain.  Suggest further imaging evaluation of leg.  Continue to support nutrition with glucerna and physical therapy as able.
65 y/o M PMHx of IDDMII, HfpEF, decompensated JEAN cirrhosis, hepatic encephalopathy, previous episode of SBP, previous GBS bacteremia and ESBL UTI with left thigh hematoma and now leukocytosis and fever.    Overall, Leukocytosis, Fever, Abnormal U/A, Pre-LT Evaluation    Recent CT Scan with minimal ascites  I suspect that leukocytosis and fever is reactive from hematoma vs. UTI  Lower suspicion for superinfected hematoma given physical examination (no tenderness to palpation at this time)  U/A with pyuria and UCx with > 100K GNR  BCx NGTD at 48 hours - would discontinue Vancomycin  Would continue meropenem pending UCx ID and sensies  Continue to trend WBC  No absolute ID contraindications for OLT    I will continue to follow. Please feel free to contact me with any further questions.    Eusebio Pérez M.D.  [facility] Division of Infectious Disease  8AM-5PM: Pager Number [phone]  After Hours (or if no response): Please contact the Infectious Diseases Office at [phone]
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 He was re-admitted from subacute rehab on 12/26/19 due to acute on chronic anemia with LLE hematoma, also with hepatic encephalopathy.    # Low-grade fevers and leukocytosis: Urine culture from 1/5/20 with >100k GNR, speciation and sensitivities pending. Continue meropenem (1/6- ) for now, as per Transplant ID. Blood cultures from 1/5/20 with NGTD. Exam not suggestive of superinfection of LLE hematoma. Leukocytosis improved from WBC 17.1 to 12.3 today with IV antibiotics.     # LLE hematoma: CTA (1/3/20) with slight increased size of hematoma within left vastus intermedialis (14.4 cm, previously 14.2 cm on CT from 12/31/19), without evidence of active extravasation. Hb/HCT currently stable without further need for PRBC transfusions.    # Acute on chronic anemia: Secondary to acute LLE hematoma, as well as chronic occult GI blood loss related to known GAVE, PHG, and AVMs, as well as possible spur cell anemia related to his end-stage liver disease. Hb/HCT currently stable.    # Hepatic encephalopathy: Appears back to his baseline mental status today. Continue rifaximin and lactulose.    # Hypervolemic hyponatremia: Na 131 today. Recommend 1.5L fluid restriction.    # Anasarca: Improving. Continue furosemide and spironolactone. Would not increase diuretics further at this time given recent MISA and SBPs in 100s.    # Decompensated JEAN cirrhosis: Listed for liver transplant with MELD-Na 29 (1/6/20), blood type A. Current MELD-Na 28 today.    Please don't hesitate to call with any questions/concerns.    Letitia Mo M.D., Ph.D.  Transplant Hepatology  Cell: [phone] .
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 He was re-admitted from subacute rehab on 12/26/19 due to acute on chronic anemia with LLE hematoma, also with hepatic encephalopathy.    # ESBL E. Coli UTI: Urine culture from 1/5/20 with >100k ESBL E. coli. Appears to be susceptible to some oral antibiotics including Bactrim. Continue meropenem (1/6- ) for now, but will follow-up Transplant ID recommendations re: whether he can be safely transitioned to an oral antibiotic if discharged back to rehab. Blood cultures from 1/5/20 with NGTD. Has been afebrile for >48h and leukocytosis has resolved on antibiotics.    # LLE hematoma: CTA (1/3/20) with slight increased size of hematoma within left vastus intermedialis (14.4 cm, previously 14.2 cm on CT from 12/31/19), without evidence of active extravasation. Hb/HCT currently stable without further need for PRBC transfusions.    # Acute on chronic anemia: Secondary to acute LLE hematoma, as well as chronic occult GI blood loss related to known GAVE, PHG, and AVMs, as well as possible spur cell anemia related to his end-stage liver disease. Hb/HCT currently stable.    # Hepatic encephalopathy: Resolved, currently remains at his baseline mental status. Continue rifaximin and lactulose.    # Hypervolemic hyponatremia: Resolved, with Na 135 today. Continue 1.5L fluid restriction.    # Anasarca: Improving, but still with bilateral (L>R) peripheral edema. Continue furosemide 40 mg po daily and spironolactone 50 mg po daily. Renal function currently remains stable and SBPs in 100s-110s though remains on midodrine 20 mg po tid.    # Decompensated JEAN cirrhosis: Listed for liver transplant with MELD-Na 29 (1/6/20), blood type A. Current MELD-Na 24 today.    Please don't hesitate to call with any questions/concerns.    Letitia Mo M.D., Ph.D.  Transplant Hepatology  Cell: [phone] .
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 He was re-admitted from subacute rehab on 12/26/19 due to acute on chronic anemia with LLE hematoma, also with hepatic encephalopathy.    # ESBL E. Coli UTI: Resolved. Urine culture from 1/5/20 with >100k ESBL E. coli. Blood cultures (1/5/20) negative. S/p meropenem (1/6-9) and Bactrim (1/9-10). Prior leukocytosis resolved and no further fevers. No urinary symptoms. Should resume SBP prophylaxis now that treatment-dosed antibiotics have been completed.    # LLE hematoma: Stable, with no expansion based on exam even after mechanical fall yesterday. No fracture on post-fall XR.    # Acute on chronic anemia: Secondary to acute LLE hematoma, as well as chronic occult GI blood loss related to known GAVE, PHG, and AVMs, as well as possible spur cell anemia related to his end-stage liver disease. Hb/HCT currently stable.    # Hepatic encephalopathy: Resolved, currently remains at his baseline mental status. Continue rifaximin and lactulose.    # Hypervolemic hyponatremia: Na 131 today. Continue 1.5L fluid restriction.    # Anasarca: Improved, but still with some dependent edema in his torso and bilateral legs. Continue furosemide 40 mg po daily and spironolactone 50 mg po daily. Remains on midodrine 20 mg po tid to help avoid hypotension and preserve renal function with diuresis.    # Decompensated JEAN cirrhosis: Listed for liver transplant with MELD-Na 29 (1/6/20), blood type A. Current MELD-Na 26 today.    We will arrange for close post-hospital discharge follow-up with his transplant hepatologist, Dr. Yomi Medina, within 1 week.    Please don't hesitate to call with any questions/concerns.    Letitia Mo M.D., Ph.D.  Transplant Hepatology  Cell: [phone]
Patient with presumed JEAN cirrhosis and admitted from rehab following injury to left leg.  Has hematoma requiring transfusion.  has edema of legs L>R.  suggest vascular surgery evaluation of left leg to assure no compartment syndrome developing.  Creat 1.17 on low dose diuretics.  Maintain on low sodium diet and follow renal function .  May need higher diuretic doses if renal function tolerates.  encourage PT and nutrition support.
agree with above  JEAN; listed for OLT   MELD 26  left lower ext intramuscular hematoma secondary to fall  continue rehab
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 He was re-admitted from subacute rehab on 12/26/19 due to acute on chronic anemia without overt GI bleeding, also with hepatic encephalopathy.    S/p 2 units PRBCs yesterday with appropriate response (Hb 5.6 -> 7.8), but decreased to Hb 6.9 today, now s/p additional 1 unit PRBC transfused with re-check Hb 7.7. Hemodynamically stable. No overt bleeding. Non-contrast CT abdomen/pelvis done yesterday with no evidence of retroperitoneal hemorrhage.    # Acute on chronic anemia: Likely secondary to chronic occult blood loss from known GAVE and AVMs as well as possible PHG, but may also have spur cell anemia related to his end-stage liver disease (had macrocytosis on initial labs when Hb was 5.6). Hemodynamically stable. No suspicion for variceal hemorrhage, and did not have varices on his last EGD on 10/11/19. Can discontinue PPI drip and switch back to PPI q12h. Can discontinue sucralfate. Continue ceftriaxone. Trend Hb/HCT q12h and transfuse as needed to keep Hb >7; avoid overtransfusion. Planned for EGD on Monday.    # Hepatic encephalopathy: Had [place] grade 2 encephalopathy yesterday, but back to his baseline mental status today. Continue rifaximin and lactulose, and would add Miralax bid given recent constipation.    # Anasarca: Defer diuretic therapy for now given recent MISA during last hospitalization. Had trace ascites on CT yesterday. Recommend to restrict sodium in diet to <2g/day.    # Decompensated JEAN cirrhosis: Listed for liver transplant with MELD-Na 26 (updated today), blood type A.    Please don't hesitate to call with any questions/concerns.    Letitia Mo M.D., Ph.D.  Transplant Hepatology  Cell: [phone] .
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 He was re-admitted from subacute rehab on 12/26/19 due to acute on chronic anemia without overt GI bleeding, also with hepatic encephalopathy.    # Acute on chronic anemia: S/p 3 units PRBCs thus far this admission. Likely secondary to chronic occult blood loss from known GAVE and AVMs as well as possible PHG, but may also have spur cell anemia related to his end-stage liver disease (had macrocytosis on initial labs when Hb was 5.6). Hemodynamically stable. No suspicion for variceal hemorrhage, and did not have varices on his last EGD on 10/11/19. No evidence of retroperitoneal hemorrhage on non-contrast CT. Can discontinue PPI drip and switch back to PPI q12h. Can discontinue sucralfate. Continue ceftriaxone. Trend Hb/HCT q12h and transfuse as needed to keep Hb >7; avoid overtransfusion. Planned for EGD tomorrow for likely re-treatment of GAVE +/- AVMs with APC vs RFA.    # Hepatic encephalopathy: Initially had [place] grade 2 encephalopathy on admission, but back to his baseline mental status yesterday and today. Continue rifaximin and lactulose.    # Anasarca: Start furosemide 20 mg po daily (starting at very low dose given recent MISA during last hospitalization). Only had trace ascites on CT. Recommend to restrict sodium in diet to <2g/day.    # Decompensated JEAN cirrhosis: Listed for liver transplant with MELD-Na 27 (updated today), blood type A.    Please don't hesitate to call with any questions/concerns.    Letitia Mo M.D., Ph.D.  Transplant Hepatology  Cell: [phone]
Patient with JEAN cirrhosis now admitted after left leg injury with hematoma in leg.  Patient has significant edema in whole body and suggest use of oral diuretics.  Start with low dose and assess renal response.  Maintain vigilance for left leg ischemia related to vascular compromise from hematoma.
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 He was re-admitted from subacute rehab on 12/26/19 due to acute on chronic anemia with LLE hematoma, also with hepatic encephalopathy.    # Low-grade fevers and leukocytosis: Febrile to Tmax 100.2 in past 24h and WBC increased to 17.1. Transplant ID consulted, and although LLE exam does not appear to have worsened and with no overlying changes of cellulitis, will empirically treat with antibiotics x48h while awaiting repeat culture results.    # LLE hematoma: CTA (1/3/20) with slight increased size of hematoma within left vastus intermedialis (14.4 cm, previously 14.2 cm on CT from 12/31/19), without evidence of active extravasation. Hb/HCT currently stable without further need for PRBC transfusions. Concern for possible superinfection, as above, though exam appears to be improving with less swelling/tenderness.    # Acute on chronic anemia: Secondary to acute LLE hematoma, as well as chronic occult GI blood loss related to known GAVE, PHG, and AVMs, as well as possible spur cell anemia related to his end-stage liver disease. Hb/HCT currently stable.    # Hepatic encephalopathy: Alert and oriented x4 but still with mild asterixis on exam. Continue rifaximin and lactulose.    # Hypervolemic hyponatremia: Na 129 today. Recommend 1.5L fluid restriction. Recommend to discontinue spironolactone, as below.    # Anasarca: Improving. Given hyponatremia, recommend to discontinue spironolactone and increase furosemide to 40 mg po daily.    # Decompensated JEAN cirrhosis: Listed for liver transplant with MELD-Na 29 (updated today), blood type A.    Please don't hesitate to call with any questions/concerns.    Letitia Mo M.D., Ph.D.  Transplant Hepatology  Cell: [phone]

## 2020-01-10 NOTE — PROGRESS NOTE ADULT - SUBJECTIVE AND OBJECTIVE BOX
INTERVAL HPI/OVERNIGHT EVENTS:    pt found on the floor last night after walking from bathroom, no LOC     patient seen and examined at bedside  reports feeling well  he denies n/v, abdominal pain   f/u am labs      MEDICATIONS  (STANDING):  dextrose 5%. 1000 milliLiter(s) (50 mL/Hr) IV Continuous <Continuous>  dextrose 50% Injectable 12.5 Gram(s) IV Push once  dextrose 50% Injectable 25 Gram(s) IV Push once  dextrose 50% Injectable 25 Gram(s) IV Push once  ferrous    sulfate 325 milliGRAM(s) Oral daily  folic acid 1 milliGRAM(s) Oral daily  furosemide    Tablet 40 milliGRAM(s) Oral daily  insulin glargine Injectable (LANTUS) 24 Unit(s) SubCutaneous at bedtime  insulin lispro (HumaLOG) corrective regimen sliding scale   SubCutaneous three times a day before meals  insulin lispro (HumaLOG) corrective regimen sliding scale   SubCutaneous at bedtime  insulin lispro Injectable (HumaLOG) 8 Unit(s) SubCutaneous three times a day before meals  lactulose Syrup 20 Gram(s) Oral three times a day  midodrine. 20 milliGRAM(s) Oral three times a day  pantoprazole    Tablet 40 milliGRAM(s) Oral two times a day  rifAXIMin 550 milliGRAM(s) Oral two times a day  spironolactone 50 milliGRAM(s) Oral daily  tamsulosin 0.4 milliGRAM(s) Oral at bedtime  trimethoprim  160 mG/sulfamethoxazole 800 mG 1 Tablet(s) Oral once    MEDICATIONS  (PRN):  dextrose 40% Gel 15 Gram(s) Oral once PRN Blood Glucose LESS THAN 70 milliGRAM(s)/deciliter  glucagon  Injectable 1 milliGRAM(s) IntraMuscular once PRN Glucose LESS THAN 70 milligrams/deciliter      Allergies    codeine (Anaphylaxis)    Intolerances        Review of Systems:    General:  No wt loss, fevers, chills, night sweats,fatigue,   Eyes:  Good vision, no reported pain  ENT:  No sore throat, pain, runny nose, dysphagia  CV:  No pain, palpitations, hypo/hypertension  Resp:  No dyspnea, cough, tachypnea, wheezing  GI:  No pain, No nausea, No vomiting, No diarrhea, No constipation, No weight loss, No fever, No pruritis, No rectal bleeding, No melena, No dysphagia  :  No pain, bleeding, incontinence, nocturia  Muscle:  No pain, weakness  Neuro:  No weakness, tingling, memory problems  Psych:  No fatigue, insomnia, mood problems, depression  Endocrine:  No polyuria, polydypsia, cold/heat intolerance  Heme:  No petechiae, ecchymosis, easy bruisability  Skin:  No rash, tattoos, scars, edema      Vital Signs Last 24 Hrs  T(C): 36.8 (10 Ehsan 2020 04:58), Max: 36.8 (09 Jan 2020 14:02)  T(F): 98.2 (10 Ehsan 2020 04:58), Max: 98.2 (09 Jan 2020 14:02)  HR: 85 (10 Ehsan 2020 04:58) (81 - 88)  BP: 105/66 (10 Ehsan 2020 04:58) (103/56 - 134/63)  BP(mean): --  RR: 18 (10 Ehsan 2020 04:58) (18 - 18)  SpO2: 99% (10 Ehsan 2020 04:58) (94% - 99%)    PHYSICAL EXAM:    Constitutional: NAD, well-developed  HEENT: EOMI, throat clear  Neck: No LAD, supple  Respiratory: CTA and P  Cardiovascular: S1 and S2, RRR, no M  Gastrointestinal: BS+, soft, NT/ND, neg HSM,  Extremities: No peripheral edema, neg clubing, cyanosis  Vascular: 2+ peripheral pulses  Neurological: A/O x 3, no focal deficits  Psychiatric: Normal mood, normal affect  Skin: No rashes      LABS:                        7.9    6.56  )-----------( 87       ( 09 Jan 2020 09:54 )             24.1     01-10    131<L>  |  98  |  16  ----------------------------<  190<H>  3.9   |  25  |  1.13    Ca    8.0<L>      10 Ehsan 2020 07:15    TPro  5.3<L>  /  Alb  2.2<L>  /  TBili  7.4<H>  /  DBili  x   /  AST  25  /  ALT  17  /  AlkPhos  137<H>  01-10    PT/INR - ( 10 Ehsan 2020 08:20 )   PT: 22.8 sec;   INR: 1.98 ratio         PTT - ( 10 Ehsan 2020 08:20 )  PTT:40.0 sec      RADIOLOGY & ADDITIONAL TESTS:

## 2020-01-10 NOTE — DISCHARGE NOTE PROVIDER - CARE PROVIDERS DIRECT ADDRESSES
,[email or web address],DirectAddress_Unknown,DirectAddress_Unknown ,[email or web address],DirectAddress_Unknown,DirectAddress_Unknown,DirectAddress_Unknown

## 2020-01-10 NOTE — DISCHARGE NOTE PROVIDER - PROVIDER TOKENS
PROVIDER:[TOKEN:[[id number]],FOLLOWUP:[1 month]],PROVIDER:[TOKEN:[[id number]]],FREE:[LAST:[Dr. Coto],FIRST:[Dillan],PHONE:[(   )    -],FAX:[(   )    -],ADDRESS:[Primary Care Provider]],PROVIDER:[TOKEN:[[id number]]] PROVIDER:[TOKEN:[[id number]],FOLLOWUP:[1 month]],PROVIDER:[TOKEN:[[id number]]],PROVIDER:[TOKEN:[[id number]]],FREE:[LAST:[Dr. Coto],FIRST:[Dillan],PHONE:[(   )    -],FAX:[(   )    -],ADDRESS:[Primary Care Provider]],PROVIDER:[TOKEN:[[id number]]]

## 2020-01-10 NOTE — DISCHARGE NOTE PROVIDER - CARE PROVIDER_API CALL
Eusebio Pérez)  Internal Medicine  [address] [place] [address]  Phone: [phone]  Fax: [phone]  Follow Up Time: 1 month    Letitia Mo)  Gastroenterology; Internal Medicine  [address] [place] [address]  Phone: [phone]  Fax: [phone]  Follow Up Time:     Dillan Sheldon  Primary Care Provider  Phone: (   )    -  Fax: (   )    -  Follow Up Time:     Price Padilla (DO)  Gastroenterology; Internal Medicine  [address] [place] [address]  Phone: [phone]  Fax: [phone]  Follow Up Time: Eusebio Pérez)  Internal Medicine  [address] [place] [address]  Phone: [phone]  Fax: [phone]  Follow Up Time: 1 month    Letitia Mo)  Gastroenterology; Internal Medicine  [address] [place] [address]  Phone: [phone]  Fax: [phone]  Follow Up Time:     Price Padilla ()  Gastroenterology; Internal Medicine  [address] [place] [address]  Phone: [phone]  Fax: [phone]  Follow Up Time:     Dillan Sheldon  Primary Care Provider  Phone: (   )    -  Fax: (   )    -  Follow Up Time:     Satish Macias)  Cardiovascular Disease; Internal Medicine  [address] [place], [place] [address]  Phone: [phone]  Fax: [phone]  Follow Up Time:

## 2020-01-10 NOTE — DISCHARGE NOTE PROVIDER - NSDCCPCAREPLAN_GEN_ALL_CORE_FT
PRINCIPAL DISCHARGE DIAGNOSIS  Diagnosis: GIB (gastrointestinal bleeding)  Assessment and Plan of Treatment: Pt seen by GI and Hepatology team: Acute on chronic anemia likely secondary to thigh hematoma, no active bleeding seen on CTA on 1/3/20.  S/p  upper gastrointestinal endoscopy 12/30 with non-bleeding small (< 5 mm) esophageal varices, not amendable to banding, Portal hypertensive gastropathy, Gastric antral vascular ectasia without bleeding. Treated with argon plasma coagulation (APC)  Follow up with GI and transplant surgery as OP.      SECONDARY DISCHARGE DIAGNOSES  Diagnosis: Fever  Assessment and Plan of Treatment: Seen by ID: Recent CT A/P with minimal ascites, suspect that leukocytosis and fever is reactive from hematoma vs. UTI. Lower suspicion for superinfected hematoma given physical examination,  BCx NGTD, UTI with E coli susceptible to bactrim to be continued until 1/10/20. Vaccination: Influenza 12/17/19, PCV13 12/16/19 (Due for PPSV23 in February). Will require Hepatitis B vaccination (will address after discharge),  Follow up with Infectious Diseases Office in ~1 month time for completion of vaccine. Patient was provided with office contact number of [phone]. PRINCIPAL DISCHARGE DIAGNOSIS  Diagnosis: GIB (gastrointestinal bleeding)  Assessment and Plan of Treatment: Pt seen by GI and Hepatology team: Acute on chronic anemia likely secondary to thigh hematoma, no active bleeding seen on CTA on 1/3/20.  S/p  upper gastrointestinal endoscopy 12/30 with non-bleeding small (< 5 mm) esophageal varices, not amendable to banding, Portal hypertensive gastropathy, Gastric antral vascular ectasia without bleeding. Treated with argon plasma coagulation (APC)  Follow up with GI and transplant surgery as OP.      SECONDARY DISCHARGE DIAGNOSES  Diagnosis: Fall  Assessment and Plan of Treatment: Pt s/p mechanical fall while in hospital. Left knee Xray with no Fx.    Diagnosis: Fever  Assessment and Plan of Treatment: Seen by ID: Recent CT A/P with minimal ascites, suspect that leukocytosis and fever is reactive from hematoma vs. UTI. Lower suspicion for superinfected hematoma given physical examination,  BCx NGTD, UTI with E coli susceptible to bactrim to be continued until 1/10/20. Vaccination: Influenza 12/17/19, PCV13 12/16/19 (Due for PPSV23 in February). Will require Hepatitis B vaccination (will address after discharge),  Follow up with Infectious Diseases Office in ~1 month time for completion of vaccine. Patient was provided with office contact number of [phone].    Diagnosis: Heart failure with preserved ejection fraction  Assessment and Plan of Treatment: Seen by cardilogist for abnormal EKG, EKGs have demonstrated prolonged qtc in the past, repeat ekg shows normal QT.   CXR is clear and CT with only trace effusions. Though still with lower ext edema in the setting of hypoalbuminemia and diastolic dysfunction.  Continue with Lasix and spironolactone.  TTE normal LV systolic function EF 65%; LVH, DD Grade I, Cath with normal filling pressures and no significant CAD. No aspirin in setting of acute blood loss anemia. PRINCIPAL DISCHARGE DIAGNOSIS  Diagnosis: GIB (gastrointestinal bleeding)  Assessment and Plan of Treatment: Pt seen by GI and Hepatology team: Acute on chronic anemia likely secondary to thigh hematoma, no active bleeding seen on CTA on 1/3/20.  S/p  upper gastrointestinal endoscopy 12/30 with non-bleeding small (< 5 mm) esophageal varices, not amendable to banding, Portal hypertensive gastropathy, Gastric antral vascular ectasia without bleeding. Treated with argon plasma coagulation (APC)  Follow up with GI and transplant surgery as OP.      SECONDARY DISCHARGE DIAGNOSES  Diagnosis: Fall  Assessment and Plan of Treatment: Pt s/p mechanical fall while in hospital. Left knee Xray with no Fx.    Diagnosis: Fever  Assessment and Plan of Treatment: Seen by ID: Recent CT A/P with minimal ascites, suspect that leukocytosis and fever is reactive from hematoma vs. UTI. Lower suspicion for superinfected hematoma given physical examination,  BCx NGTD, UTI with E coli susceptible to bactrim to be continued until 1/10/20. Vaccination: Influenza 12/17/19, PCV13 12/16/19 (Due for PPSV23 in February). Will require Hepatitis B vaccination (will address after discharge),  Follow up with Infectious Diseases Office in ~1 month time for completion of vaccine. Patient was provided with office contact number of [phone].    Diagnosis: Heart failure with preserved ejection fraction  Assessment and Plan of Treatment: Seen by cardilogist for abnormal EKG, EKGs have demonstrated prolonged qtc in the past, repeat ekg shows normal QT.   CXR is clear and CT with only trace effusions. Though still with lower ext edema in the setting of hypoalbuminemia and diastolic dysfunction.  Continue with Lasix and spironolactone.  TTE normal LV systolic function EF 65%; LVH, DD Grade I, Cath with normal filling pressures and no significant CAD. No aspirin in setting of acute blood loss anemia.  Pt positive orthostatic (borderline) on Lasix 40mg qd and spironolactone. Lasix reduced to every other day (for 1 week) with orthostatics to be done daily at [facility] and adjust Lasix according to volume status.   Pt cleared for DC by Dr. Prado with OP follow up with hepatology (transplant team), GI, PCP and cardiology.

## 2020-01-10 NOTE — PROGRESS NOTE ADULT - ASSESSMENT
63 year old male with HTN, DM2, non-alcoholic cirrhosis, who was recently admitted with altered mental status, hyponatremia and high ammonia level. We last saw him during a hospitalization in 10/2019, during which he was significantly volume overloaded and required iv diuresis.  He is currently being evaluated for liver transplant.  He returns last week with acute blood loss anemia. CT with trace pleural effusions, anasarca, and atherosclerotic calcifications. He has a left thigh hematoma.  EGD with mild gave s/p APC treatment    Abnormal EKG  - His EKGs have demonstrated prolonged qtc in the past  - repeat ekg shows normal QT  - avoid qtc prolonging medications and replete K to greater than 4 and mg > 2     Diastolic HF   - CXR is clear and CT with only trace effusions. Though still with lower ext edema  - he does have some le edema in the setting of his hypoalbuminemia and diastolic dysfunction.  Now on Lasix and spironolactone.    - TTE normal LV systolic function EF 65%; LVH, DD Grade I  - cath with normal filling pressures and no significant cad.  - no asa in setting of acute blood loss anemia.  - he had a fall yesterday, which sounds mechanical. Would check orthostatics.    HTN  - BP on softer side  - hold anti-hypertensives except for diuretics.   - cont midodrine    - check orthostatics    Anemia  - transfuse to keep Hb >8. trend H/H  - GI follow up appreciated  - monitor thigh hematoma.    - All other workup and dc planning as per primary team

## 2020-01-10 NOTE — DISCHARGE NOTE PROVIDER - HOSPITAL COURSE
63 yo M with IDDM, dyslipidemia, obesity, GERD, HFpEF with mild LV diastolic dysfunction, and decompensated JEAN cirrhosis complicated by ascites, history of SBP, hepatic encephalopathy, and chronic anemia with a history of duodenal ulcer as well as GAVE and duodenal AVM s/p APC (last on 10/11/19), who is UNOS listed for liver transplantation at [facility]. He was re-admitted from subacute rehab on 12/26/19 due to acute on chronic anemia without overt GI bleeding (found to have LLE hematoma), also with hepatic encephalopathy.     Pt seen by GI and Hepatology team: Found with leukocytosis and low grade fever: Found to have ESBL UTI and left thigh hematoma, minimal concern for infected left thigh hematoma given no erythema, induration, or warmth on exam and stable CBC, s/p meropenem x 3 days, transitioned to Bactrim to complete antibiotic course on 1/10 with PM dose.     Acute on chronic anemia likely secondary to thigh hematoma, no active bleeding seen on CTA on 1/3/20, s/p EGD 12/30 with mild GAVE s/p APC treatment. Seen by hepatology: Decompensated JEAN cirrhosis, MELD-Na 26 on 1/10/20, varices: grade I <5mm varices on EGD 12/30 not amenable to banding; also mild GAVE s/p APC, continue with lactulose and Xifaxan. Pt continue to follow up with hepatology as OP- transplant team will coordinate follow up.                   Recommendations:    - No contraindication to discharge from Transplant Hepatology    - daily CBC, CMP, INR    - PPI PO BID for GAVE    - Continue furosemide at 40 mg PO daily    - 1.5L fluid restriction    - Low salt diet    - Continue Glucerna TID with meals given low albumin    - Lactulose TID and Xifaxan BID for HE 65 yo M with IDDM, dyslipidemia, obesity, GERD, HFpEF with mild LV diastolic dysfunction, and decompensated JEAN cirrhosis complicated by ascites, history of SBP, hepatic encephalopathy, and chronic anemia with a history of duodenal ulcer as well as GAVE and duodenal AVM s/p APC (last on 10/11/19), who is UNOS listed for liver transplantation at [facility]. He was re-admitted from subacute rehab on 12/26/19 due to acute on chronic anemia without overt GI bleeding (found to have LLE hematoma), also with hepatic encephalopathy.     Pt seen by GI and Hepatology team: Found with leukocytosis and low grade fever: Found to have ESBL UTI and left thigh hematoma, minimal concern for infected left thigh hematoma given no erythema, induration, or warmth on exam and stable CBC, s/p meropenem x 3 days, transitioned to Bactrim to complete antibiotic course on 1/10 with PM dose.     Acute on chronic anemia likely secondary to thigh hematoma, no active bleeding seen on CTA on 1/3/20, s/p EGD 12/30 with mild GAVE s/p APC treatment. Seen by hepatology: Decompensated JEAN cirrhosis, MELD-Na 26 on 1/10/20, varices: grade I <5mm varices on EGD 12/30 not amenable to banding; also mild GAVE s/p APC, continue with lactulose and Xifaxan. Pt continue to follow up with hepatology as OP- transplant team will coordinate follow up. 63 yo M with IDDM, dyslipidemia, obesity, GERD, HFpEF with mild LV diastolic dysfunction, and decompensated JEAN cirrhosis complicated by ascites, history of SBP, hepatic encephalopathy, and chronic anemia with a history of duodenal ulcer as well as GAVE and duodenal AVM s/p APC (last on 10/11/19), who is UNOS listed for liver transplantation at [facility]. He was re-admitted from subacute rehab on 12/26/19 due to acute on chronic anemia without overt GI bleeding (found to have LLE hematoma), also with hepatic encephalopathy.     Pt seen by GI and Hepatology team: Found with leukocytosis and low grade fever: Found to have ESBL UTI and left thigh hematoma, minimal concern for infected left thigh hematoma given no erythema, induration, or warmth on exam and stable CBC, s/p meropenem x 3 days, transitioned to Bactrim to complete antibiotic course on 1/10 with PM dose.     Acute on chronic anemia likely secondary to thigh hematoma, no active bleeding seen on CTA on 1/3/20, s/p EGD 12/30 with mild GAVE s/p APC treatment. Seen by hepatology: Decompensated JEAN cirrhosis, MELD-Na 26 on 1/10/20, varices: grade I <5mm varices on EGD 12/30 not amenable to banding; also mild GAVE s/p APC, continue with lactulose and Xifaxan. Pt continue to follow up with hepatology as OP- transplant team will coordinate follow up.     Pt positive orthostatic (borderline) on Lasix 40qd and spironolactone. Lasix reduced to every other day with orthostatics to be done daily at [facility] and adjust Lasix according to volume status.     Pt cleared for DC by Dr. Prado with OP follow up with hepatology (transplant team), GI, PCP and cardiology.

## 2020-01-10 NOTE — PROGRESS NOTE ADULT - SUBJECTIVE AND OBJECTIVE BOX
Chief Complaint: Left thigh swelling    Interval Events:   - The patient fell while walking back to his bed from the bathroom and fell onto his left knee. He denied LOC and hitting his head  - The patient otherwise felt well this morning and had no complaints    Allergies:  codeine (Anaphylaxis)  NO RED MEAT (Unknown)    MEDICATIONS  (STANDING):  dextrose 5%. 1000 milliLiter(s) (50 mL/Hr) IV Continuous <Continuous>  dextrose 50% Injectable 12.5 Gram(s) IV Push once  dextrose 50% Injectable 25 Gram(s) IV Push once  dextrose 50% Injectable 25 Gram(s) IV Push once  ferrous    sulfate 325 milliGRAM(s) Oral daily  folic acid 1 milliGRAM(s) Oral daily  furosemide    Tablet 40 milliGRAM(s) Oral daily  insulin glargine Injectable (LANTUS) 24 Unit(s) SubCutaneous at bedtime  insulin lispro (HumaLOG) corrective regimen sliding scale   SubCutaneous three times a day before meals  insulin lispro (HumaLOG) corrective regimen sliding scale   SubCutaneous at bedtime  insulin lispro Injectable (HumaLOG) 8 Unit(s) SubCutaneous three times a day before meals  lactulose Syrup 20 Gram(s) Oral three times a day  midodrine. 20 milliGRAM(s) Oral three times a day  pantoprazole    Tablet 40 milliGRAM(s) Oral two times a day  rifAXIMin 550 milliGRAM(s) Oral two times a day  spironolactone 50 milliGRAM(s) Oral daily  tamsulosin 0.4 milliGRAM(s) Oral at bedtime  trimethoprim  160 mG/sulfamethoxazole 800 mG 1 Tablet(s) Oral two times a day    MEDICATIONS  (PRN):  dextrose 40% Gel 15 Gram(s) Oral once PRN Blood Glucose LESS THAN 70 milliGRAM(s)/deciliter  glucagon  Injectable 1 milliGRAM(s) IntraMuscular once PRN Glucose LESS THAN 70 milligrams/deciliter    PMHX/PSHX:  GIB (gastrointestinal bleeding)  GERD with esophagitis  Hepatic encephalopathy  Obesity  Fatty liver disease, nonalcoholic  Renal stones  Hypertension  Neuropathy  Hypercholesteremia  Diabetes  S/P cholecystectomy    Family history:  Family history of type 2 diabetes mellitus  Family history of hypertension  Family history of stomach cancer    ROS:     General:  No wt loss, fevers, chills, night sweats, fatigue,   Eyes:  Good vision, no reported pain  ENT:  No sore throat, pain, runny nose, dysphagia  CV:  No pain, palpitations, hypo/hypertension  Pulm:  No dyspnea, cough, tachypnea, wheezing  GI:  No pain, No nausea, No vomiting, No diarrhea, No constipation, No weight loss, No fever, No pruritis, No rectal bleeding, No tarry stools, No dysphagia  :  No pain, bleeding, incontinence, nocturia  Muscle:  No pain, weakness  Neuro:  No weakness, tingling, memory problems  Psych:  No fatigue, insomnia, mood problems, depression  Endocrine:  No polyuria, polydipsia, cold/heat intolerance  Heme:  No petechiae, ecchymosis, easy bruisability  Skin:  No rash, tattoos, scars, edema    PHYSICAL EXAM:   Vital Signs:  Vital Signs Last 24 Hrs  T(C): 36.8 (10 Ehsan 2020 04:58), Max: 36.8 (2020 14:02)  T(F): 98.2 (10 Ehsan 2020 04:58), Max: 98.2 (2020 14:02)  HR: 85 (10 Ehsan 2020 04:58) (81 - 88)  BP: 105/66 (10 Ehsan 2020 04:58) (103/56 - 134/63)  BP(mean): --  RR: 18 (10 Ehsan 2020 04:58) (18 - 18)  SpO2: 99% (10 Ehsan 2020 04:58) (94% - 99%)  Daily Weight in k.9 (10 Ehsan 2020 06:21)    GENERAL:  No acute distress  HEENT:  Normocephalic/atraumatic, +scleral icterus  CHEST:  Normal effort, no accessory muscle use  ABDOMEN:  Soft, non-tender, non-distended, normoactive bowel sounds  EXTREMITIES:  No cyanosis, clubbing, +edema (LLE>RLE)  SKIN:  No rash/erythema  NEURO:  Alert and oriented x 3, no asterixis    LABS:                        7.9    6.56  )-----------( 87       ( 2020 09:54 )             24.1     01-10    131<L>  |  98  |  16  ----------------------------<  190<H>  3.9   |  25  |  1.13    Ca    8.0<L>      10 Ehsan 2020 07:15    TPro  5.3<L>  /  Alb  2.2<L>  /  TBili  7.4<H>  /  DBili  x   /  AST  25  /  ALT  17  /  AlkPhos  137<H>  01-10    LIVER FUNCTIONS - ( 10 Ehsan 2020 07:15 )  Alb: 2.2 g/dL / Pro: 5.3 g/dL / ALK PHOS: 137 U/L / ALT: 17 U/L / AST: 25 U/L / GGT: x           PT/INR - ( 10 Ehsan 2020 08:20 )   PT: 22.8 sec;   INR: 1.98 ratio      PTT - ( 10 Ehsan 2020 08:20 )  PTT:40.0 sec    Imaging:    < from: Xray Knee 4 Views, Left (20 @ 21:29) >    ******PRELIMINARY REPORT******    ******PRELIMINARY REPORT******          EXAM:  KNEE COMPLETE LEFT (4 VIEWS)                          PROCEDURE DATE:  2020      ******PRELIMINARY REPORT******    ******PRELIMINARY REPORT******          INTERPRETATION:  No acute fracture or dislocaiton.    Disccused with Loan Coronado NP by Dr. Fried. ()    ******PRELIMINARY REPORT******    ******PRELIMINARY REPORT******          BERTA CEDILLO M.D., RADIOLOGY RESIDENT    < end of copied text >

## 2020-01-10 NOTE — PROGRESS NOTE ADULT - SUBJECTIVE AND OBJECTIVE BOX
[facility] - Sushila Dhaliwal, Doug, Morenita, Ayo, Colleen Williamson  Office Number:  [phone]    Patient resting comfortably in bed in NAD.  Laying flat with no respiratory distress.  No complaints of chest pain, dyspnea, palpitations, PND, or orthopnea.  s/p fall yesterday, which sounds mechanical    ROS: negative unless otherwise mentioned.    Telemetry:  off    MEDICATIONS  (STANDING):  dextrose 5%. 1000 milliLiter(s) (50 mL/Hr) IV Continuous <Continuous>  dextrose 50% Injectable 12.5 Gram(s) IV Push once  dextrose 50% Injectable 25 Gram(s) IV Push once  dextrose 50% Injectable 25 Gram(s) IV Push once  ferrous    sulfate 325 milliGRAM(s) Oral daily  folic acid 1 milliGRAM(s) Oral daily  furosemide    Tablet 40 milliGRAM(s) Oral daily  insulin glargine Injectable (LANTUS) 24 Unit(s) SubCutaneous at bedtime  insulin lispro (HumaLOG) corrective regimen sliding scale   SubCutaneous three times a day before meals  insulin lispro (HumaLOG) corrective regimen sliding scale   SubCutaneous at bedtime  insulin lispro Injectable (HumaLOG) 8 Unit(s) SubCutaneous three times a day before meals  lactulose Syrup 20 Gram(s) Oral three times a day  midodrine. 20 milliGRAM(s) Oral three times a day  pantoprazole    Tablet 40 milliGRAM(s) Oral two times a day  rifAXIMin 550 milliGRAM(s) Oral two times a day  spironolactone 50 milliGRAM(s) Oral daily  tamsulosin 0.4 milliGRAM(s) Oral at bedtime  trimethoprim  160 mG/sulfamethoxazole 800 mG 1 Tablet(s) Oral once    MEDICATIONS  (PRN):  dextrose 40% Gel 15 Gram(s) Oral once PRN Blood Glucose LESS THAN 70 milliGRAM(s)/deciliter  glucagon  Injectable 1 milliGRAM(s) IntraMuscular once PRN Glucose LESS THAN 70 milligrams/deciliter      Allergies    codeine (Anaphylaxis)    Intolerances        Vital Signs Last 24 Hrs  T(C): 36.8 (10 Ehsan 2020 04:58), Max: 36.8 (09 Jan 2020 14:02)  T(F): 98.2 (10 Ehsan 2020 04:58), Max: 98.2 (09 Jan 2020 14:02)  HR: 85 (10 Ehsan 2020 04:58) (81 - 88)  BP: 105/66 (10 Ehsan 2020 04:58) (103/56 - 134/63)  BP(mean): --  RR: 18 (10 Ehsan 2020 04:58) (18 - 18)  SpO2: 99% (10 Ehsan 2020 04:58) (94% - 99%)    I&O's Summary    09 Jan 2020 07:01  -  10 Ehsan 2020 07:00  --------------------------------------------------------  IN: 0 mL / OUT: 1700 mL / NET: -1700 mL    10 Ehsan 2020 07:01  -  10 Ehsan 2020 11:18  --------------------------------------------------------  IN: 0 mL / OUT: 1120 mL / NET: -1120 mL        ON EXAM:    Constitutional: well-nourished, well-developed, NAD   HEENT:  MMM, sclerae anicteric, conjunctivae clear, no oral cyanosis.  Pulmonary: Non-labored, breath sounds are clear bilaterally, No wheezing, rales or rhonchi  Cardiovascular: Regular, S1 and S2.  No murmur.  No rubs, gallops or clicks  Gastrointestinal: Bowel Sounds present, soft, nontender.   Lymph: mod peripheral edema.   Neurological: Alert, no focal deficits  Skin: No rashes.  Psych:  Mood & affect appropriate    LABS: All Labs Reviewed:                        7.7    7.87  )-----------( 92       ( 10 Ehsan 2020 09:44 )             23.6                         7.9    6.56  )-----------( 87       ( 09 Jan 2020 09:54 )             24.1                         7.7    8.40  )-----------( 87       ( 08 Jan 2020 09:05 )             23.1     10 Ehsan 2020 07:15    131    |  98     |  16     ----------------------------<  190    3.9     |  25     |  1.13   09 Jan 2020 07:07    133    |  99     |  16     ----------------------------<  120    3.7     |  26     |  1.09   08 Jan 2020 06:38    135    |  100    |  15     ----------------------------<  143    4.0     |  22     |  1.05     Ca    8.0        10 Ehsan 2020 07:15  Ca    8.4        09 Jan 2020 07:07  Ca    8.1        08 Jan 2020 06:38    TPro  5.3    /  Alb  2.2    /  TBili  7.4    /  DBili  x      /  AST  25     /  ALT  17     /  AlkPhos  137    10 Ehsan 2020 07:15  TPro  5.3    /  Alb  2.3    /  TBili  8.2    /  DBili  x      /  AST  21     /  ALT  14     /  AlkPhos  134    09 Jan 2020 07:07  TPro  5.0    /  Alb  2.2    /  TBili  8.1    /  DBili  x      /  AST  18     /  ALT  13     /  AlkPhos  127    08 Jan 2020 06:38    PT/INR - ( 10 Ehsan 2020 08:20 )   PT: 22.8 sec;   INR: 1.98 ratio         PTT - ( 10 Ehsan 2020 08:20 )  PTT:40.0 sec      Blood Culture: Organism Escherichia coli ESBL  Gram Stain Blood -- Gram Stain --  Specimen Source .Urine Clean Catch (Midstream)  Culture-Blood --

## 2020-01-10 NOTE — DISCHARGE NOTE PROVIDER - NSDCMRMEDTOKEN_GEN_ALL_CORE_FT
insulin glargine: 28 unit(s) subcutaneous once a day (at bedtime)  insulin lispro: 8 unit(s) subcutaneous 3 times a day (before meals)  lactulose 10 g/15 mL oral syrup: 45 milliliter(s) orally every 4 hours  Lasix 20 mg oral tablet: 1 tab(s) orally once a day  midodrine 10 mg oral tablet: 2 tab(s) orally 3 times a day  pantoprazole 40 mg oral delayed release tablet: 1 tab(s) orally 2 times a day   pravastatin 40 mg oral tablet: 1 tab(s) orally once a day  rifAXIMin 550 mg oral tablet: 1 tab(s) orally 2 times a day  sucralfate 1 g oral tablet: 1 tab(s) orally 2 times a day      tamsulosin 0.4 mg oral capsule: 1 cap(s) orally once a day (at bedtime)  traMADol 50 mg oral tablet: 1 tab(s) orally 2 times a day, As Needed folic acid 1 mg oral tablet: 1 tab(s) orally once a day  furosemide 40 mg oral tablet: 1 tab(s) orally once a day  insulin glargine: 28 unit(s) subcutaneous once a day (at bedtime)  insulin lispro: 8 unit(s) subcutaneous 3 times a day (before meals)  lactulose 10 g/15 mL oral syrup: 30 milliliter(s) orally 3 times a day  Lasix 20 mg oral tablet: 1 tab(s) orally once a day  midodrine 10 mg oral tablet: 2 tab(s) orally 3 times a day  pantoprazole 40 mg oral delayed release tablet: 1 tab(s) orally 2 times a day   pravastatin 40 mg oral tablet: 1 tab(s) orally once a day  rifAXIMin 550 mg oral tablet: 1 tab(s) orally 2 times a day  spironolactone 25 mg oral tablet: 2 tab(s) orally once a day  sulfamethoxazole-trimethoprim 800 mg-160 mg oral tablet: 1 tab(s) orally once  Last dose today (1/10/20) at 6PM  tamsulosin 0.4 mg oral capsule: 1 cap(s) orally once a day (at bedtime) ferrous sulfate: 325 milligram(s) orally once a day   folic acid 1 mg oral tablet: 1 tab(s) orally once a day  furosemide 40 mg oral tablet: 1 tab(s) orally every other day x 1 week  Then assess volume status and adjust accordingly  insulin glargine: 24 unit(s) subcutaneous once a day (at bedtime)  insulin lispro: 8 unit(s) subcutaneous 3 times a day (before meals)  lactulose 10 g/15 mL oral syrup: 30 milliliter(s) orally 3 times a day  midodrine 10 mg oral tablet: 2 tab(s) orally 3 times a day  pantoprazole 40 mg oral delayed release tablet: 1 tab(s) orally 2 times a day   pravastatin 40 mg oral tablet: 1 tab(s) orally once a day  rifAXIMin 550 mg oral tablet: 1 tab(s) orally 2 times a day  Sliding scale 3 times a day : 1 Unit(s) if Glucose 151 - 200  2 Unit(s) if Glucose 201 - 250  3 Unit(s) if Glucose 251 - 300  4 Unit(s) if Glucose 301 - 350  5 Unit(s) if Glucose 351 - 400  6 Unit(s) if Glucose Greater Than 400  Sliding Scale at bed time:   spironolactone 25 mg oral tablet: 2 tab(s) orally once a day  sulfamethoxazole-trimethoprim 800 mg-160 mg oral tablet: 1 tab(s) orally once  Last dose today (1/10/20) at 6PM  tamsulosin 0.4 mg oral capsule: 1 cap(s) orally once a day (at bedtime)

## 2020-01-10 NOTE — PROGRESS NOTE ADULT - ASSESSMENT
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 He was re-admitted from subacute rehab on 12/26/19 due to acute on chronic anemia without overt GI bleeding (found to have LLE hematoma), also with hepatic encephalopathy.    # Leukocytosis and low grade fever: Found to have ESBL UTI. Minimal concern for infected left thigh hematoma given no erythema, induration, or warmth on exam. Transplant ID following. On meropenem x 3 days, transitioned to Bactrim to complete antibiotic course on 1/10  # Left leg swelling and pain      - Large 14cm left thigh hematoma  #Acute on chronic anemia     - acute component likely secondary to thigh hematoma above     - no active bleeding seen on CTA on 1/3/20      - s/p EGD 12/30 with mild GAVE s/p APC treatment  # Decompensated JEAN cirrhosis, MELD-Na 25 on 1/9/20     - varices: grade I <5mm varices on EGD 12/30 not amenable to banding; also mild GAVE s/p APC      - SPE: AAO x 3, no asterixis. On lactulose and Xifaxan     - ascites: trace, not on diuretics     - HCC: none on CT this admission     - Transplant: Blood Type A positive, UNOS listed with updated MELD-Na of 27    Recommendations:  - No contraindication to discharge from Transplant Hepatology  - daily CBC, CMP, INR  - PPI PO BID for GAVE  - Continue furosemide at 40 mg PO daily  - 1.5L fluid restriction  - Low salt diet  - Continue Glucerna TID with meals given low albumin  - Lactulose TID and Xifaxan BID for HE     Please call Hepatology ([phone]) if there are any additional questions or concerns. Please call on-call GI fellow after 5pm and before 8am, and on weekends. 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 He was re-admitted from subacute rehab on 12/26/19 due to acute on chronic anemia without overt GI bleeding (found to have LLE hematoma), also with hepatic encephalopathy.    # Leukocytosis and low grade fever: Found to have ESBL UTI. Minimal concern for infected left thigh hematoma given no erythema, induration, or warmth on exam. Transplant ID following. On meropenem x 3 days, transitioned to Bactrim to complete antibiotic course on 1/10  # Left leg swelling and pain      - Large 14cm left thigh hematoma  #Acute on chronic anemia     - acute component likely secondary to thigh hematoma above     - no active bleeding seen on CTA on 1/3/20      - s/p EGD 12/30 with mild GAVE s/p APC treatment  # Decompensated JEAN cirrhosis, MELD-Na 26 on 1/10/20     - varices: grade I <5mm varices on EGD 12/30 not amenable to banding; also mild GAVE s/p APC      - SPE: AAO x 3, no asterixis. On lactulose and Xifaxan     - ascites: trace, not on diuretics     - HCC: none on CT this admission     - Transplant: Blood Type A positive, UNOS listed with updated MELD-Na of 27    Recommendations:  - No contraindication to discharge from Transplant Hepatology  - daily CBC, CMP, INR  - PPI PO BID for GAVE  - Continue furosemide at 40 mg PO daily  - 1.5L fluid restriction  - Low salt diet  - Continue Glucerna TID with meals given low albumin  - Lactulose TID and Xifaxan BID for HE     Please call Hepatology ([phone]) if there are any additional questions or concerns. Please call on-call GI fellow after 5pm and before 8am, and on weekends.

## 2020-01-10 NOTE — PROGRESS NOTE ADULT - REASON FOR ADMISSION
low Hgb
Anemia
low Hgb

## 2020-01-13 ENCOUNTER — LABORATORY RESULT (OUTPATIENT)
Age: 65
End: 2020-01-13

## 2020-01-14 LAB
ALBUMIN SERPL ELPH-MCNC: 2.4 G/DL
ALP BLD-CCNC: 151 U/L
ALT SERPL-CCNC: 21 U/L
ANION GAP SERPL CALC-SCNC: 10 MMOL/L
AST SERPL-CCNC: 35 U/L
BASOPHILS # BLD AUTO: 0.05 K/UL
BASOPHILS NFR BLD AUTO: 0.9 %
BILIRUB SERPL-MCNC: 9.2 MG/DL
BUN SERPL-MCNC: 12 MG/DL
CALCIUM SERPL-MCNC: 8.9 MG/DL
CHLORIDE SERPL-SCNC: 101 MMOL/L
CO2 SERPL-SCNC: 26 MMOL/L
CREAT SERPL-MCNC: 1.08 MG/DL
EOSINOPHIL # BLD AUTO: 0.19 K/UL
EOSINOPHIL NFR BLD AUTO: 3.6 %
GLUCOSE SERPL-MCNC: 202 MG/DL
HCT VFR BLD CALC: 27 %
HGB BLD-MCNC: 8.5 G/DL
IMM GRANULOCYTES NFR BLD AUTO: 2.1 %
INR PPP: 1.89 RATIO
LYMPHOCYTES # BLD AUTO: 1.18 K/UL
LYMPHOCYTES NFR BLD AUTO: 22.1 %
MAN DIFF?: NORMAL
MCHC RBC-ENTMCNC: 31.5 GM/DL
MCHC RBC-ENTMCNC: 35 PG
MCV RBC AUTO: 111.1 FL
MONOCYTES # BLD AUTO: 0.53 K/UL
MONOCYTES NFR BLD AUTO: 9.9 %
NEUTROPHILS # BLD AUTO: 3.29 K/UL
NEUTROPHILS NFR BLD AUTO: 61.4 %
PLATELET # BLD AUTO: 108 K/UL
POTASSIUM SERPL-SCNC: 4.6 MMOL/L
PROT SERPL-MCNC: 5.9 G/DL
PT BLD: 21.8 SEC
RBC # BLD: 2.43 M/UL
RBC # FLD: 22.8 %
SODIUM SERPL-SCNC: 137 MMOL/L
WBC # FLD AUTO: 5.35 K/UL

## 2020-01-16 ENCOUNTER — APPOINTMENT (OUTPATIENT)
Dept: HEPATOLOGY | Facility: CLINIC | Age: 65
End: 2020-01-16
Payer: MEDICARE

## 2020-01-16 ENCOUNTER — LABORATORY RESULT (OUTPATIENT)
Age: 65
End: 2020-01-16

## 2020-01-16 VITALS
SYSTOLIC BLOOD PRESSURE: 87 MMHG | TEMPERATURE: 97.7 F | HEIGHT: 72 IN | OXYGEN SATURATION: 93 % | BODY MASS INDEX: 38.06 KG/M2 | WEIGHT: 281 LBS | HEART RATE: 93 BPM | RESPIRATION RATE: 14 BRPM | DIASTOLIC BLOOD PRESSURE: 52 MMHG

## 2020-01-16 LAB
ALBUMIN SERPL ELPH-MCNC: 3.1 G/DL
ALP BLD-CCNC: 144 U/L
ALT SERPL-CCNC: 22 U/L
ANION GAP SERPL CALC-SCNC: 12 MMOL/L
AST SERPL-CCNC: 39 U/L
BILIRUB SERPL-MCNC: 12 MG/DL
BUN SERPL-MCNC: 13 MG/DL
CALCIUM SERPL-MCNC: 9.6 MG/DL
CHLORIDE SERPL-SCNC: 101 MMOL/L
CO2 SERPL-SCNC: 25 MMOL/L
CREAT SERPL-MCNC: 1.14 MG/DL
GLUCOSE SERPL-MCNC: 124 MG/DL
INR PPP: 1.85 RATIO
POTASSIUM SERPL-SCNC: 4.8 MMOL/L
PROT SERPL-MCNC: 6.8 G/DL
PT BLD: 21.5 SEC
SODIUM SERPL-SCNC: 138 MMOL/L

## 2020-01-16 PROCEDURE — 99215 OFFICE O/P EST HI 40 MIN: CPT

## 2020-01-16 RX ORDER — SUCRALFATE 1 G/1
1 TABLET ORAL TWICE DAILY
Refills: 0 | Status: DISCONTINUED | COMMUNITY
End: 2020-01-16

## 2020-01-21 ENCOUNTER — APPOINTMENT (OUTPATIENT)
Dept: TRANSPLANT | Facility: CLINIC | Age: 65
End: 2020-01-21
Payer: MEDICARE

## 2020-01-21 ENCOUNTER — LABORATORY RESULT (OUTPATIENT)
Age: 65
End: 2020-01-21

## 2020-01-21 VITALS
TEMPERATURE: 98.1 F | BODY MASS INDEX: 34.81 KG/M2 | DIASTOLIC BLOOD PRESSURE: 56 MMHG | SYSTOLIC BLOOD PRESSURE: 92 MMHG | HEIGHT: 72 IN | OXYGEN SATURATION: 99 % | HEART RATE: 90 BPM | RESPIRATION RATE: 14 BRPM | WEIGHT: 257 LBS

## 2020-01-21 LAB
BASOPHILS # BLD AUTO: 0.06 K/UL
BASOPHILS NFR BLD AUTO: 1 %
EOSINOPHIL # BLD AUTO: 0.11 K/UL
EOSINOPHIL NFR BLD AUTO: 1.9 %
HCT VFR BLD CALC: 29.2 %
HGB BLD-MCNC: 9.3 G/DL
IMM GRANULOCYTES NFR BLD AUTO: 0.5 %
LYMPHOCYTES # BLD AUTO: 1.21 K/UL
LYMPHOCYTES NFR BLD AUTO: 20.7 %
MAN DIFF?: NORMAL
MCHC RBC-ENTMCNC: 31.8 GM/DL
MCHC RBC-ENTMCNC: 34.2 PG
MCV RBC AUTO: 107.4 FL
MONOCYTES # BLD AUTO: 0.46 K/UL
MONOCYTES NFR BLD AUTO: 7.9 %
NEUTROPHILS # BLD AUTO: 3.97 K/UL
NEUTROPHILS NFR BLD AUTO: 68 %
PLATELET # BLD AUTO: 112 K/UL
RBC # BLD: 2.72 M/UL
RBC # FLD: 21.8 %
WBC # FLD AUTO: 5.84 K/UL

## 2020-01-21 PROCEDURE — 99214 OFFICE O/P EST MOD 30 MIN: CPT

## 2020-01-21 NOTE — PHYSICAL EXAM
[Jaundice] : Jaundice [No Acute Distress] : no acute distress [EOMI] : extra ocular movement intact [Scleral Icterus] : scleral icterus [Normal Hearing] : normal hearing [No Neck Mass] : no neck mass [Supple] : the neck was supple [No JVD] : no jugular venous distention [No Respiratory Distress] : no respiratory distress [Clear to Auscultation] : lungs were clear to auscultation bilaterally [Normal PMI] : normal PMI [Normal S1, S2] : normal S1 and S2 [Normal Rate/Rhythm] : normal rate/rhythm [Carotids Normal] : carotids normal [Splenomegaly] : splenomegaly [Spider Angioma] : spider angioma [No Focal Deficits] : no focal deficits [Hepatojugular Reflux] : no hepatojugular reflux [Abdominal Bruit] : no abdominal bruit [Ascites Fluid Wave] : no ascites fluid wave [Abdominal Ascites] : no abdominal ascites [Ascites Tense] : no ascites tense [Caput Medusae] : no caput medusae [Ascites Shifting Dullness] : no ascites shifting dullness [Asterixis] : no asterixis [Depression] : no depression [de-identified] : Soft ESM + [de-identified] : Patient appeare din the clinic on a wheel chair. Still appears overall poor strength in his lower extremities.

## 2020-01-21 NOTE — HISTORY OF PRESENT ILLNESS
[Ascites] : Ascites [Hepatic Encephalopathy] : Hepatic Encephalopathy [Diabetes] : History of diabetes [Hypertension] : History of hypertension [History of HCC] : No history of hepatocellular carcinoma [Previous Transplant] : No history of previous transplant [Coronary Artery Disease] : No history of coronary artery disease [FreeTextEntry2] : A [FreeTextEntry1] : 24 [TextBox_42] : Mr. 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 His other medical problems include hypertension, hyperlipidemia, diabetes mellitus,fraility and hx of falls. He was recently admitted at the [facility] with complaints of worsening jaundice, dizziness and falls. \par \par He reports mild pain in the left lower extremity particularly in the thigh. He reports that he had a CT scan done recently in the rehabilitation facility for evaluation of his left lower extremity pain. He otherwise feels well. He denies any nausea, vomiting, abdominal pain, headache.\par He is currently getting physical therapy and the [facility] rehabilitation facility. \par \par Interval history dated January 16, 2020\par \par Patient returns for a followup visit along with his wife and son. Patient came to the clinic using a walker. He is no longer using a wheelchair. He was recently admitted in the hospital with severe anemia, and had multiple blood transfusions. He had an upper endoscopy done on December 30, 2019. This revealed nonbleeding small esophageal varices. Additionally changes suggestive of portal hypertensive gastropathy was noted. Gastric antral vascular fascia without bleeding was noted. This was treated with APC.\par Prashant test from January 13, 2020 revealed a hemoglobin of 8.5 and platelet count 108,000. His serum creatinine is significantly improved and is now down to 1.08 mg/dL. His liver function tests revealed total bilirubin 9.2, AST 35, ALT 21, alkaline phosphatase 151. Total protein was 5.9 and albumin 2.4. INR is 1.89.\par \par Interval history 1/21/20 \par \par Pt seen by hepatology last week. Increased his lasix to 40mg daily and aldactone 100mg daily for significant lower extremity edema +3.. On Midodrine 20mg TID, Rifaximin 550mg bid, Pantoprazole 40mg bid, and lactulose (to titrate for 2-3 bms daily). \par He now comes weekly and has improved. He was evaluated for a potential liver transplant and is now listed in  UNOS, 24 meld. HE improved, 2 bms per day..Left leg hematoma greatly improved. Weight loss ? 25 lbs.. Will decrease diuretics and re draw labs. \par \par

## 2020-01-21 NOTE — ASSESSMENT
[FreeTextEntry1] : Mr. 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 His current MELD score is 24. His other medical problems include hypertension, hyperlipidemia, diabetes mellitus,fraility and hx of falls. Patient is currently listed for liver transplant on the UNOS list.\par \par PLAN\par - Decrease statin to 20mg, decrease diuretic to 40/20mg lasix alternating days. RTC 2 weeks for Dr. Medina.\par - Keep patient listed on the UNOS list\par \par

## 2020-01-21 NOTE — REVIEW OF SYSTEMS
[Fatigue] : fatigue [Sclera anicteric] : sclera anicteric [Muscle Pain] : muscle pain [Anemia] : anemia [Negative] : Neurological [Fever] : no fever [Chills] : no chills [Night Sweats] : no night sweats [Sore throat] : no sore throat [Pain/Stiffness] : no pain/stiffness [Trauma] : no trauma [FreeTextEntry9] : Reports pain in the left thigh.

## 2020-01-22 LAB
ALBUMIN SERPL ELPH-MCNC: 2.9 G/DL
ALP BLD-CCNC: 213 U/L
ALT SERPL-CCNC: 17 U/L
ANION GAP SERPL CALC-SCNC: 13 MMOL/L
AST SERPL-CCNC: 24 U/L
BASOPHILS # BLD AUTO: 0.07 K/UL
BASOPHILS NFR BLD AUTO: 1 %
BILIRUB SERPL-MCNC: 10.3 MG/DL
BUN SERPL-MCNC: 19 MG/DL
CALCIUM SERPL-MCNC: 9.3 MG/DL
CHLORIDE SERPL-SCNC: 95 MMOL/L
CO2 SERPL-SCNC: 29 MMOL/L
CREAT SERPL-MCNC: 1.22 MG/DL
EOSINOPHIL # BLD AUTO: 0.14 K/UL
EOSINOPHIL NFR BLD AUTO: 2 %
GLUCOSE SERPL-MCNC: 227 MG/DL
HCT VFR BLD CALC: 28 %
HGB BLD-MCNC: 9.2 G/DL
IMM GRANULOCYTES NFR BLD AUTO: 0.4 %
INR PPP: 1.84 RATIO
LYMPHOCYTES # BLD AUTO: 1.17 K/UL
LYMPHOCYTES NFR BLD AUTO: 16.7 %
MAN DIFF?: NORMAL
MCHC RBC-ENTMCNC: 32.9 GM/DL
MCHC RBC-ENTMCNC: 35.5 PG
MCV RBC AUTO: 108.1 FL
MONOCYTES # BLD AUTO: 0.62 K/UL
MONOCYTES NFR BLD AUTO: 8.8 %
NEUTROPHILS # BLD AUTO: 4.98 K/UL
NEUTROPHILS NFR BLD AUTO: 71.1 %
PLATELET # BLD AUTO: 86 K/UL
POTASSIUM SERPL-SCNC: 3.8 MMOL/L
PROT SERPL-MCNC: 6.7 G/DL
PT BLD: 21.6 SEC
RBC # BLD: 2.59 M/UL
RBC # FLD: 20.1 %
SODIUM SERPL-SCNC: 137 MMOL/L
WBC # FLD AUTO: 7.01 K/UL

## 2020-01-22 RX ORDER — MIDODRINE HYDROCHLORIDE 10 MG/1
10 TABLET ORAL 3 TIMES DAILY
Qty: 180 | Refills: 5 | Status: DISCONTINUED | COMMUNITY
End: 2020-01-22

## 2020-02-04 ENCOUNTER — APPOINTMENT (OUTPATIENT)
Dept: HEPATOLOGY | Facility: CLINIC | Age: 65
End: 2020-02-04
Payer: MEDICARE

## 2020-02-04 ENCOUNTER — LABORATORY RESULT (OUTPATIENT)
Age: 65
End: 2020-02-04

## 2020-02-04 VITALS
TEMPERATURE: 98 F | HEIGHT: 72 IN | SYSTOLIC BLOOD PRESSURE: 87 MMHG | DIASTOLIC BLOOD PRESSURE: 50 MMHG | HEART RATE: 91 BPM | WEIGHT: 257 LBS | BODY MASS INDEX: 34.81 KG/M2 | RESPIRATION RATE: 14 BRPM

## 2020-02-04 PROCEDURE — 99214 OFFICE O/P EST MOD 30 MIN: CPT

## 2020-02-05 LAB
ALBUMIN SERPL ELPH-MCNC: 2.8 G/DL
ALP BLD-CCNC: 173 U/L
ALT SERPL-CCNC: 17 U/L
ANION GAP SERPL CALC-SCNC: 10 MMOL/L
AST SERPL-CCNC: 25 U/L
BASOPHILS # BLD AUTO: 0.06 K/UL
BASOPHILS NFR BLD AUTO: 0.7 %
BILIRUB SERPL-MCNC: 9.3 MG/DL
BUN SERPL-MCNC: 14 MG/DL
CALCIUM SERPL-MCNC: 9.4 MG/DL
CHLORIDE SERPL-SCNC: 99 MMOL/L
CO2 SERPL-SCNC: 25 MMOL/L
CREAT SERPL-MCNC: 1.06 MG/DL
EOSINOPHIL # BLD AUTO: 0.13 K/UL
EOSINOPHIL NFR BLD AUTO: 1.5 %
GLUCOSE SERPL-MCNC: 164 MG/DL
HCT VFR BLD CALC: 27.5 %
HGB BLD-MCNC: 9.3 G/DL
IMM GRANULOCYTES NFR BLD AUTO: 1.4 %
INR PPP: 1.81 RATIO
LYMPHOCYTES # BLD AUTO: 1.21 K/UL
LYMPHOCYTES NFR BLD AUTO: 13.6 %
MAN DIFF?: NORMAL
MCHC RBC-ENTMCNC: 33.8 GM/DL
MCHC RBC-ENTMCNC: 36 PG
MCV RBC AUTO: 106.6 FL
MONOCYTES # BLD AUTO: 0.85 K/UL
MONOCYTES NFR BLD AUTO: 9.6 %
NEUTROPHILS # BLD AUTO: 6.51 K/UL
NEUTROPHILS NFR BLD AUTO: 73.2 %
PLATELET # BLD AUTO: 95 K/UL
POTASSIUM SERPL-SCNC: 4.5 MMOL/L
PROT SERPL-MCNC: 6.3 G/DL
PT BLD: 20.8 SEC
RBC # BLD: 2.58 M/UL
RBC # FLD: 16 %
SODIUM SERPL-SCNC: 133 MMOL/L
WBC # FLD AUTO: 8.88 K/UL

## 2020-02-07 NOTE — PHYSICAL THERAPY INITIAL EVALUATION ADULT - BED MOBILITY LIMITATIONS, REHAB EVAL
Continue your current medications and treatment.    Have the labs and xray done and call for results.    Further care will depend on the results of the tests and how you progress.   decreased ability to use legs for bridging/pushing/decreased ability to use arms for pushing/pulling

## 2020-02-11 ENCOUNTER — INPATIENT (INPATIENT)
Facility: HOSPITAL | Age: 65
LOS: 9 days | Discharge: HOME HEALTH | DRG: 871 | End: 2020-02-21
Attending: INTERNAL MEDICINE | Admitting: STUDENT IN AN ORGANIZED HEALTH CARE EDUCATION/TRAINING PROGRAM
Payer: MEDICARE

## 2020-02-11 VITALS
TEMPERATURE: 101 F | WEIGHT: 250 LBS | RESPIRATION RATE: 16 BRPM | SYSTOLIC BLOOD PRESSURE: 80 MMHG | HEART RATE: 111 BPM | HEIGHT: 72 IN | OXYGEN SATURATION: 100 % | DIASTOLIC BLOOD PRESSURE: 47 MMHG

## 2020-02-11 DIAGNOSIS — A41.9 SEPSIS, UNSPECIFIED ORGANISM: ICD-10-CM

## 2020-02-11 DIAGNOSIS — Z90.49 ACQUIRED ABSENCE OF OTHER SPECIFIED PARTS OF DIGESTIVE TRACT: Chronic | ICD-10-CM

## 2020-02-11 LAB
ALBUMIN SERPL ELPH-MCNC: 2.7 G/DL — LOW (ref 3.3–5)
ALP SERPL-CCNC: 161 U/L — HIGH (ref 40–120)
ALT FLD-CCNC: 27 U/L — SIGNIFICANT CHANGE UP (ref 10–45)
AMMONIA BLD-MCNC: 67 UMOL/L — HIGH (ref 11–55)
ANION GAP SERPL CALC-SCNC: 14 MMOL/L — SIGNIFICANT CHANGE UP (ref 5–17)
APPEARANCE UR: ABNORMAL
APTT BLD: 35.4 SEC — SIGNIFICANT CHANGE UP (ref 27.5–36.3)
AST SERPL-CCNC: 25 U/L — SIGNIFICANT CHANGE UP (ref 10–40)
BACTERIA # UR AUTO: ABNORMAL
BASE EXCESS BLDV CALC-SCNC: -1.2 MMOL/L — SIGNIFICANT CHANGE UP (ref -2–2)
BASOPHILS # BLD AUTO: 0 K/UL — SIGNIFICANT CHANGE UP (ref 0–0.2)
BASOPHILS NFR BLD AUTO: 0 % — SIGNIFICANT CHANGE UP (ref 0–2)
BILIRUB SERPL-MCNC: 11 MG/DL — HIGH (ref 0.2–1.2)
BILIRUB UR-MCNC: ABNORMAL
BUN SERPL-MCNC: 19 MG/DL — SIGNIFICANT CHANGE UP (ref 7–23)
CA-I SERPL-SCNC: 1.2 MMOL/L — SIGNIFICANT CHANGE UP (ref 1.12–1.3)
CALCIUM SERPL-MCNC: 9.6 MG/DL — SIGNIFICANT CHANGE UP (ref 8.4–10.5)
CHLORIDE BLDV-SCNC: 100 MMOL/L — SIGNIFICANT CHANGE UP (ref 96–108)
CHLORIDE SERPL-SCNC: 95 MMOL/L — LOW (ref 96–108)
CO2 BLDV-SCNC: 24 MMOL/L — SIGNIFICANT CHANGE UP (ref 22–30)
CO2 SERPL-SCNC: 21 MMOL/L — LOW (ref 22–31)
COLOR SPEC: ABNORMAL
CREAT SERPL-MCNC: 1.24 MG/DL — SIGNIFICANT CHANGE UP (ref 0.5–1.3)
DIFF PNL FLD: ABNORMAL
ELLIPTOCYTES BLD QL SMEAR: SLIGHT — SIGNIFICANT CHANGE UP
EOSINOPHIL # BLD AUTO: 0 K/UL — SIGNIFICANT CHANGE UP (ref 0–0.5)
EOSINOPHIL NFR BLD AUTO: 0 % — SIGNIFICANT CHANGE UP (ref 0–6)
EPI CELLS # UR: 0 /HPF — SIGNIFICANT CHANGE UP
GAS PNL BLDV: 127 MMOL/L — LOW (ref 135–145)
GAS PNL BLDV: SIGNIFICANT CHANGE UP
GLUCOSE BLDC GLUCOMTR-MCNC: 163 MG/DL — HIGH (ref 70–99)
GLUCOSE BLDV-MCNC: 124 MG/DL — HIGH (ref 70–99)
GLUCOSE SERPL-MCNC: 128 MG/DL — HIGH (ref 70–99)
GLUCOSE UR QL: ABNORMAL
HCO3 BLDV-SCNC: 23 MMOL/L — SIGNIFICANT CHANGE UP (ref 21–29)
HCT VFR BLD CALC: 29.6 % — LOW (ref 39–50)
HCT VFR BLDA CALC: 27 % — LOW (ref 39–50)
HGB BLD CALC-MCNC: 8.6 G/DL — LOW (ref 13–17)
HGB BLD-MCNC: 9.8 G/DL — LOW (ref 13–17)
HYALINE CASTS # UR AUTO: 0 /LPF — SIGNIFICANT CHANGE UP (ref 0–2)
HYPOCHROMIA BLD QL: SLIGHT — SIGNIFICANT CHANGE UP
INR BLD: 2.05 RATIO — HIGH (ref 0.88–1.16)
KETONES UR-MCNC: NEGATIVE — SIGNIFICANT CHANGE UP
LACTATE BLDV-MCNC: 5.3 MMOL/L — CRITICAL HIGH (ref 0.7–2)
LACTATE BLDV-MCNC: 5.8 MMOL/L — CRITICAL HIGH (ref 0.7–2)
LEUKOCYTE ESTERASE UR-ACNC: ABNORMAL
LYMPHOCYTES # BLD AUTO: 0 % — LOW (ref 13–44)
LYMPHOCYTES # BLD AUTO: 0 K/UL — LOW (ref 1–3.3)
MANUAL SMEAR VERIFICATION: SIGNIFICANT CHANGE UP
MCHC RBC-ENTMCNC: 33.1 GM/DL — SIGNIFICANT CHANGE UP (ref 32–36)
MCHC RBC-ENTMCNC: 35.4 PG — HIGH (ref 27–34)
MCV RBC AUTO: 106.9 FL — HIGH (ref 80–100)
MONOCYTES # BLD AUTO: 0.79 K/UL — SIGNIFICANT CHANGE UP (ref 0–0.9)
MONOCYTES NFR BLD AUTO: 3.5 % — SIGNIFICANT CHANGE UP (ref 2–14)
NEUTROPHILS # BLD AUTO: 21.8 K/UL — HIGH (ref 1.8–7.4)
NEUTROPHILS NFR BLD AUTO: 86.2 % — HIGH (ref 43–77)
NEUTS BAND # BLD: 10.3 % — HIGH (ref 0–8)
NITRITE UR-MCNC: NEGATIVE — SIGNIFICANT CHANGE UP
PCO2 BLDV: 38 MMHG — SIGNIFICANT CHANGE UP (ref 35–50)
PH BLDV: 7.4 — SIGNIFICANT CHANGE UP (ref 7.35–7.45)
PH UR: 5.5 — SIGNIFICANT CHANGE UP (ref 5–8)
PLAT MORPH BLD: NORMAL — SIGNIFICANT CHANGE UP
PLATELET # BLD AUTO: 107 K/UL — LOW (ref 150–400)
PO2 BLDV: 44 MMHG — SIGNIFICANT CHANGE UP (ref 25–45)
POLYCHROMASIA BLD QL SMEAR: SLIGHT — SIGNIFICANT CHANGE UP
POTASSIUM BLDV-SCNC: 4.7 MMOL/L — SIGNIFICANT CHANGE UP (ref 3.5–5.3)
POTASSIUM SERPL-MCNC: 4.2 MMOL/L — SIGNIFICANT CHANGE UP (ref 3.5–5.3)
POTASSIUM SERPL-SCNC: 4.2 MMOL/L — SIGNIFICANT CHANGE UP (ref 3.5–5.3)
PROT SERPL-MCNC: 7 G/DL — SIGNIFICANT CHANGE UP (ref 6–8.3)
PROT UR-MCNC: 100 — SIGNIFICANT CHANGE UP
PROTHROM AB SERPL-ACNC: 23.9 SEC — HIGH (ref 10–12.9)
RAPID RVP RESULT: SIGNIFICANT CHANGE UP
RBC # BLD: 2.77 M/UL — LOW (ref 4.2–5.8)
RBC # FLD: 14.6 % — HIGH (ref 10.3–14.5)
RBC BLD AUTO: ABNORMAL
RBC CASTS # UR COMP ASSIST: 24 /HPF — HIGH (ref 0–4)
SAO2 % BLDV: 74 % — SIGNIFICANT CHANGE UP (ref 67–88)
SCHISTOCYTES BLD QL AUTO: SLIGHT — SIGNIFICANT CHANGE UP
SODIUM SERPL-SCNC: 130 MMOL/L — LOW (ref 135–145)
SP GR SPEC: 1.02 — SIGNIFICANT CHANGE UP (ref 1.01–1.02)
UROBILINOGEN FLD QL: ABNORMAL
WBC # BLD: 22.59 K/UL — HIGH (ref 3.8–10.5)
WBC # FLD AUTO: 22.59 K/UL — HIGH (ref 3.8–10.5)
WBC UR QL: 240 /HPF — HIGH (ref 0–5)

## 2020-02-11 PROCEDURE — 99223 1ST HOSP IP/OBS HIGH 75: CPT

## 2020-02-11 PROCEDURE — 99285 EMERGENCY DEPT VISIT HI MDM: CPT

## 2020-02-11 PROCEDURE — 99291 CRITICAL CARE FIRST HOUR: CPT

## 2020-02-11 PROCEDURE — 93010 ELECTROCARDIOGRAM REPORT: CPT

## 2020-02-11 PROCEDURE — 74177 CT ABD & PELVIS W/CONTRAST: CPT | Mod: 26

## 2020-02-11 PROCEDURE — 99223 1ST HOSP IP/OBS HIGH 75: CPT | Mod: GC

## 2020-02-11 PROCEDURE — 71045 X-RAY EXAM CHEST 1 VIEW: CPT | Mod: 26

## 2020-02-11 PROCEDURE — 71260 CT THORAX DX C+: CPT | Mod: 26

## 2020-02-11 RX ORDER — PIPERACILLIN AND TAZOBACTAM 4; .5 G/20ML; G/20ML
3.38 INJECTION, POWDER, LYOPHILIZED, FOR SOLUTION INTRAVENOUS ONCE
Refills: 0 | Status: COMPLETED | OUTPATIENT
Start: 2020-02-11 | End: 2020-02-11

## 2020-02-11 RX ORDER — DEXTROSE 50 % IN WATER 50 %
25 SYRINGE (ML) INTRAVENOUS ONCE
Refills: 0 | Status: DISCONTINUED | OUTPATIENT
Start: 2020-02-11 | End: 2020-02-21

## 2020-02-11 RX ORDER — MEROPENEM 1 G/30ML
1000 INJECTION INTRAVENOUS EVERY 8 HOURS
Refills: 0 | Status: DISCONTINUED | OUTPATIENT
Start: 2020-02-11 | End: 2020-02-13

## 2020-02-11 RX ORDER — FOLIC ACID 0.8 MG
1 TABLET ORAL DAILY
Refills: 0 | Status: DISCONTINUED | OUTPATIENT
Start: 2020-02-11 | End: 2020-02-21

## 2020-02-11 RX ORDER — LACTULOSE 10 G/15ML
20 SOLUTION ORAL THREE TIMES A DAY
Refills: 0 | Status: DISCONTINUED | OUTPATIENT
Start: 2020-02-11 | End: 2020-02-12

## 2020-02-11 RX ORDER — ENOXAPARIN SODIUM 100 MG/ML
40 INJECTION SUBCUTANEOUS DAILY
Refills: 0 | Status: DISCONTINUED | OUTPATIENT
Start: 2020-02-11 | End: 2020-02-21

## 2020-02-11 RX ORDER — MEROPENEM 1 G/30ML
1000 INJECTION INTRAVENOUS ONCE
Refills: 0 | Status: COMPLETED | OUTPATIENT
Start: 2020-02-11 | End: 2020-02-11

## 2020-02-11 RX ORDER — SODIUM CHLORIDE 9 MG/ML
2400 INJECTION, SOLUTION INTRAVENOUS ONCE
Refills: 0 | Status: COMPLETED | OUTPATIENT
Start: 2020-02-11 | End: 2020-02-11

## 2020-02-11 RX ORDER — NOREPINEPHRINE BITARTRATE/D5W 8 MG/250ML
0.05 PLASTIC BAG, INJECTION (ML) INTRAVENOUS
Qty: 8 | Refills: 0 | Status: DISCONTINUED | OUTPATIENT
Start: 2020-02-11 | End: 2020-02-12

## 2020-02-11 RX ORDER — SODIUM CHLORIDE 9 MG/ML
1000 INJECTION, SOLUTION INTRAVENOUS
Refills: 0 | Status: DISCONTINUED | OUTPATIENT
Start: 2020-02-11 | End: 2020-02-21

## 2020-02-11 RX ORDER — MEROPENEM 1 G/30ML
500 INJECTION INTRAVENOUS EVERY 8 HOURS
Refills: 0 | Status: DISCONTINUED | OUTPATIENT
Start: 2020-02-11 | End: 2020-02-11

## 2020-02-11 RX ORDER — ALBUMIN HUMAN 25 %
100 VIAL (ML) INTRAVENOUS EVERY 6 HOURS
Refills: 0 | Status: DISCONTINUED | OUTPATIENT
Start: 2020-02-11 | End: 2020-02-12

## 2020-02-11 RX ORDER — GLUCAGON INJECTION, SOLUTION 0.5 MG/.1ML
1 INJECTION, SOLUTION SUBCUTANEOUS ONCE
Refills: 0 | Status: DISCONTINUED | OUTPATIENT
Start: 2020-02-11 | End: 2020-02-21

## 2020-02-11 RX ORDER — CHLORHEXIDINE GLUCONATE 213 G/1000ML
1 SOLUTION TOPICAL
Refills: 0 | Status: DISCONTINUED | OUTPATIENT
Start: 2020-02-11 | End: 2020-02-12

## 2020-02-11 RX ORDER — PANTOPRAZOLE SODIUM 20 MG/1
40 TABLET, DELAYED RELEASE ORAL
Refills: 0 | Status: DISCONTINUED | OUTPATIENT
Start: 2020-02-11 | End: 2020-02-21

## 2020-02-11 RX ORDER — DEXTROSE 50 % IN WATER 50 %
12.5 SYRINGE (ML) INTRAVENOUS ONCE
Refills: 0 | Status: DISCONTINUED | OUTPATIENT
Start: 2020-02-11 | End: 2020-02-21

## 2020-02-11 RX ORDER — PIPERACILLIN AND TAZOBACTAM 4; .5 G/20ML; G/20ML
3.38 INJECTION, POWDER, LYOPHILIZED, FOR SOLUTION INTRAVENOUS EVERY 8 HOURS
Refills: 0 | Status: DISCONTINUED | OUTPATIENT
Start: 2020-02-11 | End: 2020-02-11

## 2020-02-11 RX ORDER — DEXTROSE 50 % IN WATER 50 %
15 SYRINGE (ML) INTRAVENOUS ONCE
Refills: 0 | Status: DISCONTINUED | OUTPATIENT
Start: 2020-02-11 | End: 2020-02-21

## 2020-02-11 RX ORDER — INSULIN LISPRO 100/ML
VIAL (ML) SUBCUTANEOUS
Refills: 0 | Status: DISCONTINUED | OUTPATIENT
Start: 2020-02-11 | End: 2020-02-21

## 2020-02-11 RX ORDER — MIDODRINE HYDROCHLORIDE 2.5 MG/1
20 TABLET ORAL EVERY 8 HOURS
Refills: 0 | Status: DISCONTINUED | OUTPATIENT
Start: 2020-02-11 | End: 2020-02-21

## 2020-02-11 RX ORDER — INSULIN GLARGINE 100 [IU]/ML
12 INJECTION, SOLUTION SUBCUTANEOUS AT BEDTIME
Refills: 0 | Status: DISCONTINUED | OUTPATIENT
Start: 2020-02-11 | End: 2020-02-21

## 2020-02-11 RX ADMIN — SODIUM CHLORIDE 2400 MILLILITER(S): 9 INJECTION, SOLUTION INTRAVENOUS at 12:46

## 2020-02-11 RX ADMIN — MIDODRINE HYDROCHLORIDE 20 MILLIGRAM(S): 2.5 TABLET ORAL at 22:44

## 2020-02-11 RX ADMIN — Medication 50 MILLILITER(S): at 18:00

## 2020-02-11 RX ADMIN — MEROPENEM 100 MILLIGRAM(S): 1 INJECTION INTRAVENOUS at 22:07

## 2020-02-11 RX ADMIN — PANTOPRAZOLE SODIUM 40 MILLIGRAM(S): 20 TABLET, DELAYED RELEASE ORAL at 17:34

## 2020-02-11 RX ADMIN — SODIUM CHLORIDE 2400 MILLILITER(S): 9 INJECTION, SOLUTION INTRAVENOUS at 10:47

## 2020-02-11 RX ADMIN — INSULIN GLARGINE 12 UNIT(S): 100 INJECTION, SOLUTION SUBCUTANEOUS at 22:43

## 2020-02-11 RX ADMIN — Medication 1: at 17:30

## 2020-02-11 RX ADMIN — Medication 10.63 MICROGRAM(S)/KG/MIN: at 12:45

## 2020-02-11 RX ADMIN — CHLORHEXIDINE GLUCONATE 1 APPLICATION(S): 213 SOLUTION TOPICAL at 17:00

## 2020-02-11 RX ADMIN — MEROPENEM 100 MILLIGRAM(S): 1 INJECTION INTRAVENOUS at 14:07

## 2020-02-11 RX ADMIN — Medication 1 MILLIGRAM(S): at 17:34

## 2020-02-11 RX ADMIN — LACTULOSE 20 GRAM(S): 10 SOLUTION ORAL at 22:43

## 2020-02-11 RX ADMIN — PIPERACILLIN AND TAZOBACTAM 3.38 GRAM(S): 4; .5 INJECTION, POWDER, LYOPHILIZED, FOR SOLUTION INTRAVENOUS at 12:46

## 2020-02-11 RX ADMIN — ENOXAPARIN SODIUM 40 MILLIGRAM(S): 100 INJECTION SUBCUTANEOUS at 18:00

## 2020-02-11 RX ADMIN — PIPERACILLIN AND TAZOBACTAM 200 GRAM(S): 4; .5 INJECTION, POWDER, LYOPHILIZED, FOR SOLUTION INTRAVENOUS at 10:46

## 2020-02-11 NOTE — ED PROVIDER NOTE - ATTENDING CONTRIBUTION TO CARE
Dr. Mace (Attending Physician)  I performed a history and physical exam of the patient and discussed their management with the resident. I reviewed the resident's note and agree with the documented findings and plan of care. My medical decision making and observations are found above.

## 2020-02-11 NOTE — ED ADULT NURSE REASSESSMENT NOTE - NS ED NURSE REASSESS COMMENT FT1
Pt remains in the ED. Resting comfortably in bed. A&Ox3. Breathing spontaneously and unlabored. NAD. VSS. Pt tolerating norepi drip well. PT at CT. Will continue to monitor. Awaiting dispo.

## 2020-02-11 NOTE — ED PROVIDER NOTE - CLINICAL SUMMARY MEDICAL DECISION MAKING FREE TEXT BOX
64M p/w jaundice, fever, tachycardia. Concern for intraabd infection vs. bacteremia vs. viral syndrome. Transplant candidate. Will get labs, sepsis protocol, abx, fluids. COntact transplant, admit. 64M p/w jaundice, fever, tachycardia. Abdomen soft and nontender, not distended so will not diagnostic tap will cover with zosyn for possible intraabd infection/sbp vs. bacteremia vs. viral syndrome. Transplant candidate. Will get labs, sepsis protocol, abx, fluids. Contact transplant, admit.  Dr. Mace (Attending Physician)

## 2020-02-11 NOTE — H&P ADULT - NSHPREVIEWOFSYSTEMS_GEN_ALL_CORE
CONSTITUTIONAL: +weakness, +chills, No fevers  EYES/ENT: No visual changes;  No vertigo or throat pain   NECK: No pain or stiffness  RESPIRATORY: No cough, wheezing, hemoptysis; No shortness of breath  CARDIOVASCULAR: No chest pain or palpitations  GASTROINTESTINAL: No abdominal or epigastric pain. No nausea, vomiting, or hematemesis; No diarrhea or constipation. No melena or hematochezia.  GENITOURINARY: No dysuria, frequency or hematuria  NEUROLOGICAL: No numbness or weakness  SKIN: No itching, burning, rashes, or lesions   All other review of systems is negative unless indicated above.

## 2020-02-11 NOTE — ED ADULT NURSE REASSESSMENT NOTE - NS ED NURSE REASSESS COMMENT FT1
Pt remains in the ED. Resting comfortably in bed. A&Ox3. Breathing spontaneously and unlabored. NAD. Pt became hypotensive. MD Ann aware. Started on norepi drip. Will continue to monitor. Awaiting dispo.

## 2020-02-11 NOTE — ED ADULT NURSE NOTE - OBJECTIVE STATEMENT
64 y M with Pmhx of cirrhosis presents to the ED status post fall out of bed. When he rolled out of bed, he hit his medical alert bracelet, Pt reports that he was more alerted then normal. Had a fever at home. Pt reports having multiple episodes of diarrhea and not taking his lactulose since last night due to the diarrhea. On assessment, A&Ox4. Denies lightheadedness, dizziness, headaches, numbness and tingling. Breathing spontaneously and unlabored. Lungs bilaterally CTA. Sating 98% on Room air. Denies cough, SOB and CP. S1 and S2 heard. No Peripheral edema. Cap refill 2s. No JVD. Peripheral pulses strong and equal bilaterally. On cardiac monitor, Sinus tachycardia. Denies CP, SOB and palpitations. Abdomen soft, nondistended, negative CVA tenderness, positive bowel sounds in all four quadrants. Pt is continent. Denies n/v/d, dysuria, melena and hematuria. Pt sclera is yellow and pt is jaundice. IV placed 20g in bilateral forearms. Labs drawn and sent. Pt awaiting dispo.

## 2020-02-11 NOTE — CONSULT NOTE ADULT - CONSULT REASON
"[facility]  Psychiatry  Progress Note    Patient Name: Jenni Toth  MRN: [id number]   Code Status: Full Code  Admission Date: 4/9/2019  Hospital Length of Stay: 11 days  Expected Discharge Date: 4/23/2019  Attending Physician: Matthew Chowdhury MD  Primary Care Provider: More Peoples MD    Current Legal Status: N/A    Patient information was obtained from patient and past medical records.     Subjective:     Principal Problem:Urinary tract infection associated with indwelling urethral catheter    Chief Complaint: depression    HPI:     History of Present Illness:   Jenni Toth is a 34 y.o. female with no past psychiatric history and past medical history c/w Lupus, HTN, and transverse myelitis who [place] [facility] [place] to [place] and has been treated with ertapenem, now with concern of osteo. Psychiatry was originally consulted to address the patient's symptoms of Depression.     Patient reports significant decline in health over the past year, states that her sleep and appetite have been poor. Reports guilt in relation to not being able to raise her 2 year old daughter. 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 States "My family was my life". Patient reports that her  was previously her best friend and recently their relationship has been strained in the setting of her medical problems. Reports very little social support as her mother struggles with drug use and her father has not been present the majority of her life. Denies having any close friends. Denies SI/HI/AVH, +anhedonia. States three daughters keep her hopeful for life, and although sometimes she feels fatigued by her medical admissions she doesn't ever think of taking her life. Denies previous episodes of depression prior to this year.       Collateral:   None obtained    SUBJECTIVE:     Medical Review Of Systems:  Integument/breast: " "positive for rash and skin lesion(s)  Musculoskeletal:positive for back pain    Psychiatric Review Of Systems - Is patient experiencing or having changes in:  sleep: yes  appetite: yes  weight: no  energy/anergy: yes  interest/pleasure/anhedonia: yes  somatic symptoms: no  libido: yes   anxiety/panic: no  guilty/hopelessness: yes  concentration: no  S.I.B.s/risky behavior: no  any drugs: no  alcohol: no     Past Psychiatric History:  Previous Medication Trials: no   Previous Psychiatric Hospitalizations: no   Previous Suicide Attempts: no   History of Violence: no  Outpatient Psychiatrist: no    Social History:  Marital Status:   Children: 3, daughters youngest is 2 years olf  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 Patient has been medication compliant. Received no psychiatric PRNs in the past 24 hours. Patient calm and cooperative with interview with blunted affect and minimal eye contact. Mood is "poor". No side effects reported with initiation of Remeron on Friday, but continues to report poor sleep (slept  ~4 hours last night), poor appetite, and low mood. Amenable to increasing Remeron dose.  Denies SI, HI, AVH. Reports sleeping and eating well. No somatic complaints, no other complaints during interview.        Interval History: see hospital course    Family History     Problem Relation (Age of Onset)    Cancer Father, Paternal Grandfather    Diabetes Mellitus Mother, Maternal Grandfather    " "Heart disease Maternal Grandfather    Hypertension Mother, Maternal Grandfather    Lupus Paternal Aunt        Tobacco Use    Smoking status: Former Smoker     Years: 0.00     Types: Cigarettes     Last attempt to quit: 2018     Years since quittin.4    Smokeless tobacco: Never Used    Tobacco comment: CIGAR USER, 1 CIGAR A DAY   Substance and Sexual Activity    Alcohol use: No     Alcohol/week: 1.2 oz     Types: 1 Glasses of wine, 1 Shots of liquor per week     Comment: Last drink over few years ago    Drug use: Yes     Types: Marijuana     Comment: poor appetite    Sexual activity: Not Currently     Partners: Male     Psychotherapeutics (From admission, onward)    Start     Stop Route Frequency Ordered    19  mirtazapine tablet 7.5 mg      -- Oral Nightly 19 1333           Review of Systems  Objective:     Vital Signs (Most Recent):  Temp: 98.5 °F (36.9 °C) (19 0759)  Pulse: 98 (19 0759)  Resp: 18 (19 0759)  BP: 119/76 (19 0759)  SpO2: 96 % (19 0759) Vital Signs (24h Range):  Temp:  [98.5 °F (36.9 °C)-98.9 °F (37.2 °C)] 98.5 °F (36.9 °C)  Pulse:  [] 98  Resp:  [14-18] 18  SpO2:  [94 %-97 %] 96 %  BP: (114-127)/(70-84) 119/76     Height: 5' 4" (162.6 cm)  Weight: 102 kg (224 lb 13.9 oz)  Body mass index is 38.6 kg/m².      Intake/Output Summary (Last 24 hours) at 2019 1022  Last data filed at 2019 0600  Gross per 24 hour   Intake 600 ml   Output 1425 ml   Net -825 ml       Physical Exam      Mental Status Exam:  Appearance: fair hygiene and grooming, dressed in hospital gown, NAD, appears stated age  Behavior/Cooperation: calm, cooperative, minimal eye contact  Language: fluent english  Speech: slowed, monotonous, hesitant  Mood: "poor"  Affect: blunted  Thought Process: linear  Thought Content:  denies SI/HI/paranoia/delusions; no objective evidence of paranoia or delusions  Perception: denies AVH; no objective evidence of AVH " Sepsis, cirrhosis   Orientation: grossly intact  Memory: intact to conversation  Attention Span/Concentration: intact to conversation  Fund of Knowledge: appropriate for education level  Insight: good  Judgment: good    Significant Labs:   Recent Results (from the past 48 hour(s))   CBC auto differential    Collection Time: 04/20/19 12:54 PM   Result Value Ref Range    WBC 6.49 3.90 - 12.70 K/uL    RBC 3.92 (L) 4.00 - 5.40 M/uL    Hemoglobin 10.2 (L) 12.0 - 16.0 g/dL    Hematocrit 36.3 (L) 37.0 - 48.5 %    MCV 93 82 - 98 fL    MCH 26.0 (L) 27.0 - 31.0 pg    MCHC 28.1 (L) 32.0 - 36.0 g/dL    RDW 19.6 (H) 11.5 - 14.5 %    Platelets 224 150 - 350 K/uL    MPV 9.4 9.2 - 12.9 fL    Immature Granulocytes 0.8 (H) 0.0 - 0.5 %    Gran # (ANC) 3.5 1.8 - 7.7 K/uL    Immature Grans (Abs) 0.05 (H) 0.00 - 0.04 K/uL    Lymph # 2.2 1.0 - 4.8 K/uL    Mono # 0.6 0.3 - 1.0 K/uL    Eos # 0.1 0.0 - 0.5 K/uL    Baso # 0.03 0.00 - 0.20 K/uL    nRBC 0 0 /100 WBC    Gran% 54.4 38.0 - 73.0 %    Lymph% 34.2 18.0 - 48.0 %    Mono% 8.6 4.0 - 15.0 %    Eosinophil% 1.5 0.0 - 8.0 %    Basophil% 0.5 0.0 - 1.9 %    Differential Method Automated    Comprehensive metabolic panel    Collection Time: 04/20/19 12:54 PM   Result Value Ref Range    Sodium 142 136 - 145 mmol/L    Potassium 4.4 3.5 - 5.1 mmol/L    Chloride 111 (H) 95 - 110 mmol/L    CO2 23 23 - 29 mmol/L    Glucose 112 (H) 70 - 110 mg/dL    BUN, Bld 12 6 - 20 mg/dL    Creatinine 0.8 0.5 - 1.4 mg/dL    Calcium 9.2 8.7 - 10.5 mg/dL    Total Protein 9.1 (H) 6.0 - 8.4 g/dL    Albumin 2.5 (L) 3.5 - 5.2 g/dL    Total Bilirubin 0.2 0.1 - 1.0 mg/dL    Alkaline Phosphatase 67 55 - 135 U/L    AST 15 10 - 40 U/L    ALT 10 10 - 44 U/L    Anion Gap 8 8 - 16 mmol/L    eGFR if African American >60.0 >60 mL/min/1.73 m^2    eGFR if non African American >60.0 >60 mL/min/1.73 m^2   Magnesium    Collection Time: 04/20/19 12:54 PM   Result Value Ref Range    Magnesium 1.8 1.6 - 2.6 mg/dL   Comprehensive Metabolic Panel (CMP)     Collection Time: 04/21/19  4:37 AM   Result Value Ref Range    Sodium 142 136 - 145 mmol/L    Potassium 5.7 (H) 3.5 - 5.1 mmol/L    Chloride 117 (H) 95 - 110 mmol/L    CO2 16 (L) 23 - 29 mmol/L    Glucose 80 70 - 110 mg/dL    BUN, Bld 15 6 - 20 mg/dL    Creatinine 0.8 0.5 - 1.4 mg/dL    Calcium 9.5 8.7 - 10.5 mg/dL    Total Protein 9.3 (H) 6.0 - 8.4 g/dL    Albumin 2.4 (L) 3.5 - 5.2 g/dL    Total Bilirubin 0.1 0.1 - 1.0 mg/dL    Alkaline Phosphatase 70 55 - 135 U/L    AST 33 10 - 40 U/L    ALT 12 10 - 44 U/L    Anion Gap 9 8 - 16 mmol/L    eGFR if African American >60.0 >60 mL/min/1.73 m^2    eGFR if non African American >60.0 >60 mL/min/1.73 m^2   CBC with Automated Differential    Collection Time: 04/21/19  4:37 AM   Result Value Ref Range    WBC 6.58 3.90 - 12.70 K/uL    RBC 3.83 (L) 4.00 - 5.40 M/uL    Hemoglobin 10.0 (L) 12.0 - 16.0 g/dL    Hematocrit 34.7 (L) 37.0 - 48.5 %    MCV 91 82 - 98 fL    MCH 26.1 (L) 27.0 - 31.0 pg    MCHC 28.8 (L) 32.0 - 36.0 g/dL    RDW 19.9 (H) 11.5 - 14.5 %    Platelets 201 150 - 350 K/uL    MPV 10.3 9.2 - 12.9 fL    Immature Granulocytes CANCELED 0.0 - 0.5 %    Immature Grans (Abs) CANCELED 0.00 - 0.04 K/uL    nRBC 3 (A) 0 /100 WBC    Gran% 45.0 38.0 - 73.0 %    Lymph% 48.0 18.0 - 48.0 %    Mono% 3.0 (L) 4.0 - 15.0 %    Eosinophil% 3.0 0.0 - 8.0 %    Basophil% 0.0 0.0 - 1.9 %    Myelocytes 1.0 %    Platelet Estimate Appears normal     Aniso Slight     Poik Slight     Poly Occasional     Hypo Occasional     Tear Drop Cells Occasional     Schistocytes Present     Differential Method Manual    Magnesium    Collection Time: 04/21/19  4:37 AM   Result Value Ref Range    Magnesium 2.3 1.6 - 2.6 mg/dL   POCT glucose    Collection Time: 04/21/19  8:09 AM   Result Value Ref Range    POCT Glucose 77 70 - 110 mg/dL   POCT glucose    Collection Time: 04/21/19  9:47 AM   Result Value Ref Range    POCT Glucose 146 (H) 70 - 110 mg/dL   POCT glucose    Collection Time: 04/21/19 12:36 PM   Result  Value Ref Range    POCT Glucose 106 70 - 110 mg/dL   Potassium    Collection Time: 04/21/19 12:52 PM   Result Value Ref Range    Potassium 4.5 3.5 - 5.1 mmol/L   Basic metabolic panel    Collection Time: 04/21/19 12:52 PM   Result Value Ref Range    Sodium 141 136 - 145 mmol/L    Potassium 4.5 3.5 - 5.1 mmol/L    Chloride 113 (H) 95 - 110 mmol/L    CO2 21 (L) 23 - 29 mmol/L    Glucose 97 70 - 110 mg/dL    BUN, Bld 13 6 - 20 mg/dL    Creatinine 0.8 0.5 - 1.4 mg/dL    Calcium 9.2 8.7 - 10.5 mg/dL    Anion Gap 7 (L) 8 - 16 mmol/L    eGFR if African American >60.0 >60 mL/min/1.73 m^2    eGFR if non African American >60.0 >60 mL/min/1.73 m^2   Basic metabolic panel    Collection Time: 04/21/19 12:52 PM   Result Value Ref Range    Sodium 141 136 - 145 mmol/L    Potassium 4.5 3.5 - 5.1 mmol/L    Chloride 113 (H) 95 - 110 mmol/L    CO2 21 (L) 23 - 29 mmol/L    Glucose 97 70 - 110 mg/dL    BUN, Bld 13 6 - 20 mg/dL    Creatinine 0.8 0.5 - 1.4 mg/dL    Calcium 9.2 8.7 - 10.5 mg/dL    Anion Gap 7 (L) 8 - 16 mmol/L    eGFR if African American >60.0 >60 mL/min/1.73 m^2    eGFR if non African American >60.0 >60 mL/min/1.73 m^2   Potassium    Collection Time: 04/21/19 11:31 PM   Result Value Ref Range    Potassium 3.4 (L) 3.5 - 5.1 mmol/L   CBC with Automated Differential    Collection Time: 04/22/19  4:40 AM   Result Value Ref Range    WBC 6.35 3.90 - 12.70 K/uL    RBC 3.66 (L) 4.00 - 5.40 M/uL    Hemoglobin 9.6 (L) 12.0 - 16.0 g/dL    Hematocrit 32.6 (L) 37.0 - 48.5 %    MCV 89 82 - 98 fL    MCH 26.2 (L) 27.0 - 31.0 pg    MCHC 29.4 (L) 32.0 - 36.0 g/dL    RDW 19.9 (H) 11.5 - 14.5 %    Platelets 212 150 - 350 K/uL    MPV 11.1 9.2 - 12.9 fL      No results found for: PHENYTOIN, PHENOBARB, VALPROATE, CBMZ      Significant Imaging: I have reviewed all pertinent imaging results/findings within the past 24 hours.    Assessment/Plan:     Major depressive disorder  ASSESSMENT     Patient with multiple symptoms c/w depression with a ~6  month duration. Patient cites multiple stressor including strained relationship with  and worsening health condition. Denies suicidal ideation plan and intent. On interview today, patient reports persistent low mood, poor sleep, poor appetite despite initiation of Remeron last Friday. Amenable to increasing Remeron dose tonight.     IMPRESSION  Major Depressive Disorder    RECOMMENDATION(S)      1. Scheduled Medication(s):  Increase Remeron to 15mg QHS for insomnia and mood  May consider an activating antidepressant in the future as patient reports decreased energy/fatigue     2. PRN Medication(s):  None    3.  Monitor:  Please obtain daily EKG to monitor QTc    4. Legal Status/Precaution(s):  Patient does not meet criteria for PEC or inpatient psychiatric admission at this time. Patient is not currently an imminent danger to self or others and is not gravely disabled due to a psychiatric illness.         Need for Continued Hospitalization:   No need for inpatient psychiatric hospitalization. Continue medical care as per the primary team.    Anticipated Disposition: Per Primary Team     Total time:  15 with greater than 50% of this time spent in counseling and/or coordination of care.       Melissa Joya,    Psychiatry  [facility]

## 2020-02-11 NOTE — CONSULT NOTE ADULT - ATTENDING COMMENTS
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    # Septic shock secondary to emphysematous cystitis: Currently on Levophed drip and with lactate 5.8. Recommend to re-start home midodrine 20 mg po tid to help decrease Levophed requirement. Appreciate Transplant ID recommendations and agree with plan for carbapenem treatment given prior history of ESBL E. coli UTI (1/5/20) as well as emphysematous cystitis seen on CT. Low clinical suspicion for SBP at this time as only small volume ascites seen on CT, insufficient for safe paracentesis. Follow-up blood cultures (pending). Recommend preferential volume resuscitation with IV albumin given end-stage liver disease with tendency for third-spacing.    # Hepatic encephalopathy: Alert and oriented x4 but with mild asterixis and psychomotor slowing on exam, different from his baseline. Continue home rifaximin and lactulose, and will titrate lactulose as needed based on clinical response and to maintain at least 3-4 BMs/day.    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 Hb/HCT currently stable.    # Hypervolemic hyponatremia: Recommend IV albumin for fluid resuscitation, as above, and oral fluid restriction to <1.5L/day.    # Ascites with history of anasarca: Currently with only mild ascites and no peripheral edema. Hold home diuretics in setting of septic shock.    # Decompensated JEAN cirrhosis: Listed for liver transplant at [facility] with MELD-Na 29 (updated today), blood type A.    Please don't hesitate to call with any questions/concerns.    Letitia Mo M.D., Ph.D.  Transplant Hepatology  Cell: [phone]

## 2020-02-11 NOTE — CONSULT NOTE ADULT - ASSESSMENT
ASSESSMENT:    RECOMMENDATIONS:    CHERI Bruno, MD  Fellow, Infectious Diseases  Pager: [phone]  After 5pm and on Weekends: Call [phone] ASSESSMENT:  64/M with PMH IDDM, dyslipidemia, obesity, GERD, HFpEF with mild LV diastolic dysfunction, decompensated JEAN cirrhosis c/b ascites, h/o SBP, h/o HE, recent admission for ESBL E. 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  P/w confusion, weakness, and fall at home.  Febrile in ED (Tmax 103.3), tachycardic (110). Labs notable for leucocytosis 22,590, ammonia 67, lactate 5.8, BUN 19, Cr 1.24. CT imaging concerning for emphysematous cystitis  Started on Levophed, txf to MICU. ID consulted for abx recs  ==========  Septic shock likely 2/2 urinary source  - UA positive. Imaging revealed emphysematous cystitis  - CXR clear, RVP negative.  - sick contact reported - son (flu-like symptoms)  - past cx have grown ESBL E. coli, Klebsiella pneumoniae and Strep agalactiae    Pre-LVAD and Pre-transplant screening  HIV 4th Generation screen: Negative  Syphilis screen: Negative  EBV Serology: Old infection  Toxoplasma IgG: Negative  Measles IgG: Reactive  Mumps IgG: Reactive  Rubella IgG: Reactive  Varicella IgG: Reactive  HSV 1/2 IgG: HSV-1 IgG reactive; HSV-2 IgG non-reactive  CMV IgG: Non-reactive  MRSA screen: Pending  Hepatitis A IgG: Reactive  Hepatitis B sAg: non-reactive  Hepatitis B sAb: non-reactive  Hepatitis B c IgM Ab: non-reactive  Hepatitic C Ab: non-reactive  Quantiferon: negative    Vaccinations  - would require Hepatitis B  - would require Pneumovax    RECOMMENDATIONS:  1. Septic shock likely 2/2 urinary source  - continue Meropenem. Increase to Meropenem 1g IV q8h  - f/u admission blood cx, urine cx  - if worsening hemodynamic instability, would repeat blood cultures and add Vancomycin  ----------------  2. Pre-transplant  - would check MRSA screen, after resolution of acute issues  - would recommend Hepatitis B and Pneumovax vaccine, after resolution of acute issues  - would defer clearance for transplant, pending resolution of acute issues  Recs conveyed to primary MICU team    CHERI Bruno MD  Fellow, Infectious Diseases  Pager: [phone]  After 5pm and on Weekends: Call [phone] ASSESSMENT:  64/M with PMH IDDM, dyslipidemia, obesity, GERD, HFpEF with mild LV diastolic dysfunction, decompensated JEAN cirrhosis c/b ascites, h/o SBP, h/o HE, recent admission for ESBL E. 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  P/w confusion, weakness, and fall at home.  Febrile in ED (Tmax 103.3), tachycardic (110). Labs notable for leucocytosis 22,590, ammonia 67, lactate 5.8, BUN 19, Cr 1.24. CT imaging concerning for emphysematous cystitis  Started on Levophed, txf to MICU. ID consulted for abx recs  ==========  Septic shock likely 2/2 urinary source  - UA positive. Imaging revealed emphysematous cystitis  - CXR clear, RVP negative.  - sick contact reported - son (flu-like symptoms)  - past cx have grown ESBL E. coli, Klebsiella pneumoniae and Strep agalactiae    Pre-LVAD and Pre-transplant screening  HIV 4th Generation screen: Negative  Syphilis screen: Negative  EBV Serology: Old infection  Toxoplasma IgG: Negative  Measles IgG: Reactive  Mumps IgG: Reactive  Rubella IgG: Reactive  Varicella IgG: Reactive  HSV 1/2 IgG: HSV-1 IgG reactive; HSV-2 IgG non-reactive  CMV IgG: Non-reactive  MRSA screen: Pending  Hepatitis A IgG: Reactive  Hepatitis B sAg: non-reactive  Hepatitis B sAb: non-reactive  Hepatitis B c IgM Ab: non-reactive  Hepatitic C Ab: non-reactive  Quantiferon: negative    Vaccinations  - would require Hepatitis B  - would require Pneumovax    RECOMMENDATIONS:    1. Septic shock likely 2/2 urinary source  - continue Meropenem. Increase to Meropenem 1g IV q8h  - f/u admission blood cx, urine cx  - if worsening hemodynamic instability, would repeat blood cultures and add Vancomycin  ----------------  2. Pre-transplant  - would check MRSA screen, after resolution of acute issues  - would recommend Hepatitis B and Pneumovax vaccine, after resolution of acute issues  - would defer clearance for transplant, pending resolution of acute issues    Recs conveyed to primary MICU team    CHERI Bruno MD  Fellow, Infectious Diseases  Pager: [phone]  After 5pm and on Weekends: Call [phone]

## 2020-02-11 NOTE — H&P ADULT - NSHPLABSRESULTS_GEN_ALL_CORE
9.8    22.59 )-----------( 107      ( 2020 11:10 )             29.6           130<L>  |  95<L>  |  19  ----------------------------<  128<H>  4.2   |  21<L>  |  1.24    Ca    9.6      2020 11:10    TPro  7.0  /  Alb  2.7<L>  /  TBili  11.0<H>  /  DBili  2.9<H>  /  AST  25  /  ALT  27  /  AlkPhos  161<H>                Urinalysis Basic - ( 2020 11:32 )    Color: Orange / Appearance: Turbid / S.018 / pH: x  Gluc: x / Ketone: Negative  / Bili: Small / Urobili: 3 mg/dL   Blood: x / Protein: 100 / Nitrite: Negative   Leuk Esterase: Large / RBC: 24 /hpf /  /HPF   Sq Epi: x / Non Sq Epi: 0 /hpf / Bacteria: Many        PT/INR - ( 2020 11:53 )   PT: 23.9 sec;   INR: 2.05 ratio         PTT - ( 2020 11:53 )  PTT:35.4 sec    Lactate Trend            CAPILLARY BLOOD GLUCOSE      POCT Blood Glucose.: 122 mg/dL (2020 10:26)        Rapid Respiratory Viral Panel (20 @ 11:32)    Rapid RVP Result: NotDetec: This Respiratory Panel uses polymerase chain reaction (PCR) to detect for  adenovirus; coronavirus (HKU1, NL63, 229E, OC43); human metapneumovirus  (hMPV); human enterovirus/rhinovirus (Entero/RV); influenza A; influenza  A/H1; influenza A/H3; influenza A/H1-2009; influenza B; parainfluenza  viruses 1, 2, 3, 4; respiratory syncytial virus; Mycoplasma pneumoniae;  and Chlamydophila pneumoniae.      < from: Xray Chest 1 View AP/PA (20 @ 10:48) >    NTERPRETATION:  CLINICAL INFORMATION: Sepsis .    TECHNIQUE: Portable AP radiograph of the chest    COMPARISON: Chest radiograph 2020, chest radiograph and CT chest 2019.    FINDINGS:    The lungs are clear. No pleural effusion or pneumothorax.    Heart size cannot be accurately evaluated on this projection.      Unremarkable skeletal structures.    IMPRESSION:     Clear lungs    < end of copied text >

## 2020-02-11 NOTE — H&P ADULT - NSHPSOCIALHISTORY_GEN_ALL_CORE
Denies tobacco use, denies recent etoh use (previous etoh use was >2 years ago, denies hx of etoh abuse), denies drug abuse

## 2020-02-11 NOTE — H&P ADULT - HISTORY OF PRESENT ILLNESS
Pt is a 64M with IDDM, dyslipidemia, obesity, GERD, HFpEF with mild LV diastolic dysfunction, and decompensated JEAN cirrhosis complicated by ascites, history of SBP, hepatic encephalopathy, and chronic anemia with a history of duodenal ulcer as well as GAVE and duodenal AVM s/p APC (last on 10/11/19), who is UNOS listed for liver transplantation at [facility] who presents with confusion, weakness, and fall at home. Pt has felt worsening weakness and lethargy for past two days. At 7AM of the day of admission, pt sustained a fall at home out of bed. He pressed his life alert button and EMS brought him to the ED. Currently, pt states he feels better than he did on admission. He reports weakness, chills, and decreased appetite for past two days. He denies other physical complaints including fevers, nausea/vomiting, chest pain, SOB, abd pain, dysuria, constipation/diarrhea. He reports strict compliance with his medications.     In ED, initial VS were T103.3  /59 RR17 SaO2 98% on RA. Labs notable for WBC of 22.59, ammonia of 67, lactate on VBG of 5.8. CXR was clear, RVP negative. UA grossly positive. Pt became hypotensive to 80s/50s and was started on levophed and admitted to MICU for further management.

## 2020-02-11 NOTE — CONSULT NOTE ADULT - ASSESSMENT
Impression:     1. Sepsis likely due to UTI, BCx and UCx pending.     2. Decompensated JEAN Cirrhosis, UNOS listed. Current MELD Na 29 increased from baseline of 25 in the setting of sepsis.   -encephalopathy: patient is on lactulose and rifaximin at home.   -varices: last EGD showed small varices and GAVE on 12/30  -ascites: trace on CT/exam. History of SBP per chart but not found in [place]. On lasix/aldactone 40/100. No Abx PPX  -HCC not seen on most recent CT.     3. DM    Recommendation:  -f/u cultures  -agree w broad spectrum abx  -continue lactulose/xifaxan  -hold diuretics for now, patient appears dry on exam, is septic  -transplant ID consult  -pt accepted to MICU Impression:     1. Sepsis likely due to UTI, BCx and UCx pending.     2. Decompensated JEAN Cirrhosis, UNOS listed. Current MELD Na 29 increased from baseline of 25 in the setting of sepsis.   -encephalopathy: mild, patient is on lactulose and rifaximin at home.   -varices: last EGD showed small varices and GAVE on 12/30  -ascites: trace on CT/exam. History of SBP per chart but not found in [place]. On lasix/aldactone 40/100. No Abx PPX  -HCC not seen on most recent CT.     3. DM    Recommendation:  -f/u cultures  -agree w broad spectrum abx  -continue lactulose/xifaxan  -hold diuretics for now, patient appears dry on exam, is septic  -transplant ID consult  -pt accepted to MICU

## 2020-02-11 NOTE — ED PROVIDER NOTE - PHYSICAL EXAMINATION
[place], .:   GENERAL: Patient awake alert NAD.  HEENT: NC/AT, dry mucous membranes, PERRL, EOMI. Scleral icterus.   LUNGS: CTAB, no wheezes or crackles.   CARDIAC: Tachycardic, regular rate, no m/r/g.    ABDOMEN: Soft, NT, ND, No rebound, guarding.  EXT: No edema. No calf tenderness.   MSK: No spinal tenderness, no pain with movement, no deformities.  NEURO: A&Ox3. Moving all extremities. No focal deficits. Answering questions appropriately.  SKIN: Warm and dry. No rash.  Jaundice.  PSYCH: Normal affect.

## 2020-02-11 NOTE — CONSULT NOTE ADULT - SUBJECTIVE AND OBJECTIVE BOX
64M with IDDM, dyslipidemia, obesity, GERD, HFpEF with mild LV diastolic dysfunction, and decompensated JEAN cirrhosis complicated by ascites, history of SBP, hepatic encephalopathy, and chronic anemia with a history of duodenal ulcer  s/p APC (last on 10/11/19), who is UNOS listed for liver transplantation at [facility] who presents with confusion, weakness, and fall at home.     The patient was recently admitted for a Lower extremity hematoma and ESBL E Coli UTI and had completed a full course of abx (bactrim per ID)  He denies cough, dysuria, diarrhea, abdominal pain or new rash.     On presentation to the ED he is noted to be febrile with a leukocytosis and lactic acidosis.  He received Meropenem IV.  AAOX3 without encephalopathy  ---------    MEDICATIONS  (STANDING):  albumin human 25% IVPB 100 milliLiter(s) IV Intermittent every 6 hours  chlorhexidine 4% Liquid 1 Application(s) Topical <User Schedule>  dextrose 5%. 1000 milliLiter(s) (50 mL/Hr) IV Continuous <Continuous>  dextrose 50% Injectable 12.5 Gram(s) IV Push once  dextrose 50% Injectable 25 Gram(s) IV Push once  dextrose 50% Injectable 25 Gram(s) IV Push once  enoxaparin Injectable 40 milliGRAM(s) SubCutaneous daily  folic acid 1 milliGRAM(s) Oral daily  insulin glargine Injectable (LANTUS) 12 Unit(s) SubCutaneous at bedtime  insulin lispro (HumaLOG) corrective regimen sliding scale   SubCutaneous three times a day before meals  lactulose Syrup 20 Gram(s) Oral three times a day  meropenem  IVPB 1000 milliGRAM(s) IV Intermittent every 8 hours  midodrine 20 milliGRAM(s) Oral every 8 hours  norepinephrine Infusion 0.05 MICROgram(s)/kG/Min (10.631 mL/Hr) IV Continuous <Continuous>  pantoprazole    Tablet 40 milliGRAM(s) Oral two times a day  rifAXIMin 550 milliGRAM(s) Oral two times a day    MEDICATIONS  (PRN):  dextrose 40% Gel 15 Gram(s) Oral once PRN Blood Glucose LESS THAN 70 milliGRAM(s)/deciliter  glucagon  Injectable 1 milliGRAM(s) IntraMuscular once PRN Glucose LESS THAN 70 milligrams/deciliter      PAST MEDICAL & SURGICAL HISTORY:  GIB (gastrointestinal bleeding)  GERD with esophagitis: Gastritis &amp; Non Bleeding Ulcers  Hepatic encephalopathy  Obesity  Fatty liver disease, nonalcoholic  Renal stones: 25 years ago  Hypertension  Neuropathy  Hypercholesteremia  Diabetes  S/P cholecystectomy      Vital Signs Last 24 Hrs  T(C): 36.8 (11 Feb 2020 16:09), Max: 39.6 (11 Feb 2020 10:33)  T(F): 98.2 (11 Feb 2020 16:09), Max: 103.3 (11 Feb 2020 10:33)  HR: 107 (11 Feb 2020 17:30) (104 - 116)  BP: 130/61 (11 Feb 2020 17:30) (80/47 - 135/56)  BP(mean): 88 (11 Feb 2020 17:30) (73 - 88)  RR: 18 (11 Feb 2020 17:30) (15 - 23)  SpO2: 100% (11 Feb 2020 17:30) (96% - 100%)    I&O's Summary    11 Feb 2020 07:01  -  11 Feb 2020 17:58  --------------------------------------------------------  IN: 10.7 mL / OUT: 0 mL / NET: 10.7 mL                              9.8    22.59 )-----------( 107      ( 11 Feb 2020 11:10 )             29.6     02-11    130<L>  |  95<L>  |  19  ----------------------------<  128<H>  4.2   |  21<L>  |  1.24    Ca    9.6      11 Feb 2020 11:10    TPro  7.0  /  Alb  2.7<L>  /  TBili  11.0<H>  /  DBili  2.9<H>  /  AST  25  /  ALT  27  /  AlkPhos  161<H>  02-11        Review of systems  Gen: No weight changes, fatigue, fevers/chills, weakness  Skin: No rashes  Head/Eyes/Ears/Mouth: No headache; Normal hearing; Normal vision w/o blurriness; No sinus pain/discomfort, sore throat  Respiratory: No dyspnea, cough, wheezing, hemoptysis  CV: No chest pain, PND, orthopnea  GI: no abdominal pain. no diarrhea, constipation, nausea, vomiting, melena, hematochezia  : No increased frequency, dysuria, hematuria, nocturia  MSK: No joint pain/swelling; no back pain; no edema  Neuro: No dizziness/lightheadedness, weakness, seizures, numbness, tingling  Heme: No easy bruising or bleeding  Endo: No heat/cold intolerance  Psych: No significant nervousness, anxiety, stress, depression  All other systems were reviewed and are negative, except as noted.      PHYSICAL EXAM:  Constitutional: Well developed / well nourished  Eyes: icteric, PERRLA  ENMT: nc/at, no thrush  Neck: supple  Respiratory: CTA B/L  Cardiovascular: RRR  Gastrointestinal: Soft obese abdomen, ND. known stable, soft ventral hernia.  Genitourinary: Voiding spontaneously  Extremities: trace edema b/l, no calf TTP.   Vascular: Palpable dp pulses bilaterally.   Neurological: A&O x3  Skin: no new rashes, ulcerations, lesions  Musculoskeletal: Moving all extremities  Psychiatric: Responsive

## 2020-02-11 NOTE — CONSULT NOTE ADULT - ASSESSMENT
64M with IDDM, dyslipidemia, obesity, GERD, HFpEF with mild LV diastolic dysfunction, and decompensated JEAN cirrhosis complicated by ascites, history of SBP, hepatic encephalopathy, and chronic anemia with a history of duodenal ulcer  s/p APC (last on 10/11/19), who is UNOS listed for liver transplantation at [facility] who presents with confusion, weakness, and fall at home.     JEAN cirrhosis now with fevers  -MELD captured at 29 today  -likely urosepsis. CT with Emphysematous cystitis. recent ESBL E.Coli UTI. Appreciate ID following. Continue Meropenem and f/u pan culture  -continue medical management per MICU and Hepatology teams  -no acute surgical intervention required at this time.  Will follow. Please page [phone] with any concerns or questions

## 2020-02-11 NOTE — CONSULT NOTE ADULT - SUBJECTIVE AND OBJECTIVE BOX
Chief Complaint:  Patient is a 64y old  Male who presents with a chief complaint of     HPI:  64M with IDDM, dyslipidemia, obesity, GERD, HFpEF with mild LV diastolic dysfunction, and decompensated JEAN cirrhosis complicated by ascites, history of SBP, hepatic encephalopathy, and chronic anemia with a history of duodenal ulcer as well as GAVE and duodenal AVM s/p APC (last on 10/11/19), who is UNOS listed for liver transplantation at [facility] who presents with confusion, weakness, and fall at home.   The patient was recently admitted for a Lower extremity hematoma and ESBL E Coli UTI and had completed a full course of abx (bactrim per ID)  He denies cough, dysuria, diarrhea, abdominal pain or new rash.   On presentation to the ED he is noted to be febrile with a leukocytosis and lactic acidosis.  He received Meropenem IV.    Allergies:  codeine (Anaphylaxis)      Home Medications:    Hospital Medications:  chlorhexidine 4% Liquid 1 Application(s) Topical <User Schedule>  dextrose 40% Gel 15 Gram(s) Oral once PRN  dextrose 5%. 1000 milliLiter(s) IV Continuous <Continuous>  dextrose 50% Injectable 12.5 Gram(s) IV Push once  dextrose 50% Injectable 25 Gram(s) IV Push once  dextrose 50% Injectable 25 Gram(s) IV Push once  enoxaparin Injectable 40 milliGRAM(s) SubCutaneous daily  folic acid 1 milliGRAM(s) Oral daily  glucagon  Injectable 1 milliGRAM(s) IntraMuscular once PRN  insulin glargine Injectable (LANTUS) 12 Unit(s) SubCutaneous at bedtime  insulin lispro (HumaLOG) corrective regimen sliding scale   SubCutaneous three times a day before meals  lactulose Syrup 20 Gram(s) Oral three times a day  meropenem  IVPB 500 milliGRAM(s) IV Intermittent every 8 hours  midodrine 20 milliGRAM(s) Oral every 8 hours  norepinephrine Infusion 0.05 MICROgram(s)/kG/Min IV Continuous <Continuous>  pantoprazole    Tablet 40 milliGRAM(s) Oral two times a day  rifAXIMin 550 milliGRAM(s) Oral two times a day      PMHX/PSHX:  GIB (gastrointestinal bleeding)  GERD with esophagitis  Hepatic encephalopathy  Obesity  Fatty liver disease, nonalcoholic  Renal stones  Hypertension  Neuropathy  Hypercholesteremia  Diabetes  S/P cholecystectomy  No significant past surgical history      Family history:  Family history of type 2 diabetes mellitus  Family history of hypertension  Family history of stomach cancer  No pertinent family history in first degree relatives      Social History:     ROS:     General:  No wt loss, fevers, chills, night sweats, fatigue,   Eyes:  Good vision, no reported pain  ENT:  No sore throat, pain, runny nose, dysphagia  CV:  No pain, palpitations, hypo/hypertension  Resp:  No dyspnea, cough, tachypnea, wheezing  GI:  See HPI  :  No pain, bleeding, incontinence, nocturia  Muscle:  No pain, weakness  Neuro:  No weakness, tingling, memory problems  Psych:  No fatigue, insomnia, mood problems, depression  Endocrine:  No polyuria, polydipsia, cold/heat intolerance  Heme:  No petechiae, ecchymosis, easy bruisability  Skin:  No rash, edema      PHYSICAL EXAM:     GENERAL:  Appears stated age, well-groomed, well-nourished, no distress  HEENT:  NC/AT,  conjunctivae clear and pink,  no JVD, + Icterus  CHEST:  Full & symmetric excursion, no increased effort, breath sounds clear  HEART:  Regular rhythm, S1, S2, no murmur/rub/S3/S4, no abdominal bruit, no edema  ABDOMEN:  Soft, non-tender, non-distended, normoactive bowel sounds,  no masses , no hepatosplenomegaly  EXTREMITIES:  no cyanosis,clubbing or edema  SKIN:  No rash/erythema/ecchymoses/petechiae/wounds/abscess/warm/dry  NEURO:  Alert, oriented    Vital Signs:  Vital Signs Last 24 Hrs  T(C): 39.6 (2020 10:33), Max: 39.6 (2020 10:33)  T(F): 103.3 (2020 10:33), Max: 103.3 (2020 10:33)  HR: 116 (2020 14:15) (104 - 116)  BP: 135/56 (2020 14:15) (80/47 - 135/56)  BP(mean): --  RR: 23 (2020 14:15) (15 - 23)  SpO2: 97% (2020 14:15) (96% - 100%)  Daily Height in cm: 182.88 (2020 10:04)    Daily     LABS:                        9.8    22.59 )-----------( 107      ( 2020 11:10 )             29.6     02-11    130<L>  |  95<L>  |  19  ----------------------------<  128<H>  4.2   |  21<L>  |  1.24    Ca    9.6      2020 11:10    TPro  7.0  /  Alb  2.7<L>  /  TBili  11.0<H>  /  DBili  2.9<H>  /  AST  25  /  ALT  27  /  AlkPhos  161<H>  0211    LIVER FUNCTIONS - ( 2020 11:10 )  Alb: 2.7 g/dL / Pro: 7.0 g/dL / ALK PHOS: 161 U/L / ALT: 27 U/L / AST: 25 U/L / GGT: x           PT/INR - ( 2020 11:53 )   PT: 23.9 sec;   INR: 2.05 ratio         PTT - ( 2020 11:53 )  PTT:35.4 sec  Urinalysis Basic - ( 2020 11:32 )    Color: Orange / Appearance: Turbid / S.018 / pH: x  Gluc: x / Ketone: Negative  / Bili: Small / Urobili: 3 mg/dL   Blood: x / Protein: 100 / Nitrite: Negative   Leuk Esterase: Large / RBC: 24 /hpf /  /HPF   Sq Epi: x / Non Sq Epi: 0 /hpf / Bacteria: Many      Amylase Serum--      Lipase serum--       [id number]      Imaging: Chief Complaint:  Patient is a 64y old  Male who presents with a chief complaint of     HPI:  64M with IDDM, dyslipidemia, obesity, GERD, HFpEF with mild LV diastolic dysfunction, and decompensated JEAN cirrhosis complicated by ascites, history of SBP, hepatic encephalopathy, and chronic anemia with a history of duodenal ulcer as well as GAVE and duodenal AVM s/p APC (last on 10/11/19), who is UNOS listed for liver transplantation at [facility] who presents with confusion, weakness, and fall at home.   The patient was recently admitted for a Lower extremity hematoma and ESBL E Coli UTI and had completed a full course of abx (bactrim per ID)  He denies cough, dysuria, diarrhea, abdominal pain or new rash.   On presentation to the ED he is noted to be febrile with a leukocytosis and lactic acidosis.  He received Meropenem IV.    Allergies:  codeine (Anaphylaxis)      Home Medications:    Hospital Medications:  chlorhexidine 4% Liquid 1 Application(s) Topical <User Schedule>  dextrose 40% Gel 15 Gram(s) Oral once PRN  dextrose 5%. 1000 milliLiter(s) IV Continuous <Continuous>  dextrose 50% Injectable 12.5 Gram(s) IV Push once  dextrose 50% Injectable 25 Gram(s) IV Push once  dextrose 50% Injectable 25 Gram(s) IV Push once  enoxaparin Injectable 40 milliGRAM(s) SubCutaneous daily  folic acid 1 milliGRAM(s) Oral daily  glucagon  Injectable 1 milliGRAM(s) IntraMuscular once PRN  insulin glargine Injectable (LANTUS) 12 Unit(s) SubCutaneous at bedtime  insulin lispro (HumaLOG) corrective regimen sliding scale   SubCutaneous three times a day before meals  lactulose Syrup 20 Gram(s) Oral three times a day  meropenem  IVPB 500 milliGRAM(s) IV Intermittent every 8 hours  midodrine 20 milliGRAM(s) Oral every 8 hours  norepinephrine Infusion 0.05 MICROgram(s)/kG/Min IV Continuous <Continuous>  pantoprazole    Tablet 40 milliGRAM(s) Oral two times a day  rifAXIMin 550 milliGRAM(s) Oral two times a day      PMHX/PSHX:  GIB (gastrointestinal bleeding)  GERD with esophagitis  Hepatic encephalopathy  Obesity  Fatty liver disease, nonalcoholic  Renal stones  Hypertension  Neuropathy  Hypercholesteremia  Diabetes  S/P cholecystectomy  No significant past surgical history      Family history:  Family history of type 2 diabetes mellitus  Family history of hypertension  Family history of stomach cancer  No pertinent family history in first degree relatives      Social History:     ROS:     General:  No wt loss, fevers, chills, night sweats, fatigue,   Eyes:  Good vision, no reported pain  ENT:  No sore throat, pain, runny nose, dysphagia  CV:  No pain, palpitations, hypo/hypertension  Resp:  No dyspnea, cough, tachypnea, wheezing  GI:  See HPI  :  No pain, bleeding, incontinence, nocturia  Muscle:  No pain, weakness  Neuro:  No weakness, tingling, memory problems  Psych:  No fatigue, insomnia, mood problems, depression  Endocrine:  No polyuria, polydipsia, cold/heat intolerance  Heme:  No petechiae, ecchymosis, easy bruisability  Skin:  No rash, edema      PHYSICAL EXAM:     GENERAL:  Appears stated age, well-groomed, well-nourished, no distress  HEENT:  NC/AT,  conjunctivae clear and pink,  no JVD, + Icterus  CHEST:  Full & symmetric excursion, no increased effort, breath sounds clear  HEART:  Regular rhythm, S1, S2, no murmur/rub/S3/S4, no abdominal bruit, no edema  ABDOMEN:  Soft, non-tender, +mildly distended, normoactive bowel sounds,  no masses  EXTREMITIES:  no cyanosis,clubbing or edema  SKIN:  No rash/erythema/ecchymoses/petechiae/wounds/abscess/warm/dry  NEURO:  Alert, oriented x4, +mild asterixis, +mild psychomotor slowing    Vital Signs:  Vital Signs Last 24 Hrs  T(C): 39.6 (2020 10:33), Max: 39.6 (2020 10:33)  T(F): 103.3 (2020 10:33), Max: 103.3 (2020 10:33)  HR: 116 (2020 14:15) (104 - 116)  BP: 135/56 (2020 14:15) (80/47 - 135/56)  BP(mean): --  RR: 23 (2020 14:15) (15 - 23)  SpO2: 97% (2020 14:15) (96% - 100%)  Daily Height in cm: 182.88 (2020 10:04)    Daily     LABS:                        9.8    22.59 )-----------( 107      ( 2020 11:10 )             29.6     02-11    130<L>  |  95<L>  |  19  ----------------------------<  128<H>  4.2   |  21<L>  |  1.24    Ca    9.6      2020 11:10    TPro  7.0  /  Alb  2.7<L>  /  TBili  11.0<H>  /  DBili  2.9<H>  /  AST  25  /  ALT  27  /  AlkPhos  161<H>  02-11    LIVER FUNCTIONS - ( 2020 11:10 )  Alb: 2.7 g/dL / Pro: 7.0 g/dL / ALK PHOS: 161 U/L / ALT: 27 U/L / AST: 25 U/L / GGT: x           PT/INR - ( 2020 11:53 )   PT: 23.9 sec;   INR: 2.05 ratio         PTT - ( 2020 11:53 )  PTT:35.4 sec  Urinalysis Basic - ( 2020 11:32 )    Color: Orange / Appearance: Turbid / S.018 / pH: x  Gluc: x / Ketone: Negative  / Bili: Small / Urobili: 3 mg/dL   Blood: x / Protein: 100 / Nitrite: Negative   Leuk Esterase: Large / RBC: 24 /hpf /  /HPF   Sq Epi: x / Non Sq Epi: 0 /hpf / Bacteria: Many      Amylase Serum--      Lipase serum--       [id number]      Imaging:

## 2020-02-11 NOTE — H&P ADULT - NSHPPHYSICALEXAM_GEN_ALL_CORE
.  VITAL SIGNS:  T(C): 39.6 (02-11-20 @ 10:33), Max: 39.6 (02-11-20 @ 10:33)  T(F): 103.3 (02-11-20 @ 10:33), Max: 103.3 (02-11-20 @ 10:33)  HR: 116 (02-11-20 @ 14:15) (104 - 116)  BP: 135/56 (02-11-20 @ 14:15) (80/47 - 135/56)  BP(mean): --  RR: 23 (02-11-20 @ 14:15) (15 - 23)  SpO2: 97% (02-11-20 @ 14:15) (96% - 100%)  Wt(kg): --    PHYSICAL EXAM:    Constitutional: NAD, jaundiced  Head: NC/AT  Eyes: PERRLA, EOMI, +scleral icterus  ENT: no nasal discharge; no oropharyngeal erythema or exudates; dry oral mucosa  Neck: supple; no JVD or thyromegaly  Respiratory: CTA B/L; no W/R/R, no retractions  Cardiac: +S1/S2; RRR; no M/R/G; PMI non-displaced  Gastrointestinal: obese, soft, NT/ND; no rebound or guarding; +BS  Extremities: WWP, no clubbing or cyanosis; no peripheral edema  Vascular: 2+ radial, DP/PT pulses B/L  Lymphatic: no submandibular or cervical LAD  Neurologic: AAOx2

## 2020-02-11 NOTE — ED PROVIDER NOTE - NS ED ROS FT
CONST: no known fevers, +chills  EYES: no pain, no vision changes  ENT: no sore throat, no ear pain, no change in hearing  CV: no chest pain, no leg swelling  RESP: no shortness of breath, no cough  ABD: no abdominal pain, no nausea, no vomiting, no diarrhea  : no dysuria, no flank pain, no hematuria  MSK: no back pain, no extremity pain  NEURO: no headache or additional neurologic complaints  HEME: no easy bleeding  SKIN:  no rash

## 2020-02-11 NOTE — CONSULT NOTE ADULT - ATTENDING COMMENTS
64/M with PMH IDDM, dyslipidemia, obesity, GERD, HFpEF with mild LV diastolic dysfunction, decompensated JEAN cirrhosis c/b ascites, h/o SBP, h/o HE, recent admission for ESBL E. 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 Patient Presents with Septic Shock    CT A/P with Emphysematous cystitis and U/A with pyuria  Favor Urinary Source    Overall, Septic Shock, Emphysematous Cystitis, Fever, Leukocytosis, Pre-Liver Transplant Evaluation, Encounter to Vaccinate Patient    Given prior ESBL isolates agree with meropenem  If any further clinical deterioration would add Vancomycin pending blood cultures  Patient not cleared for OLT at this time until etiology of infection determined and he stabilizes  Patient requires HBV and Pneumovax vaccination but defer until clinically stabilized    I will continue to follow. Please feel free to contact me with any further questions.    Eusebio Pérez M.D.  [facility] Division of Infectious Disease  8AM-5PM: Pager Number [phone]  After Hours (or if no response): Please contact the Infectious Diseases Office at [phone]     The above assessment and plan were discussed with MICU Team, Transplant Team

## 2020-02-11 NOTE — CONSULT NOTE ADULT - SUBJECTIVE AND OBJECTIVE BOX
Patient is a 64y old  Male who presents with a chief complaint of   HPI:  Pt is a 64M with IDDM, dyslipidemia, obesity, GERD, HFpEF with mild LV diastolic dysfunction, and decompensated JEAN cirrhosis complicated by ascites, history of SBP, hepatic encephalopathy, and chronic anemia with a history of duodenal ulcer as well as GAVE and duodenal AVM s/p APC (last on 10/11/19), who is UNOS listed for liver transplantation at [facility] who presents with confusion, weakness, and fall at home. Pt has felt worsening weakness and lethargy for past two days. At 7AM of the day of admission, pt sustained a fall at home out of bed. He pressed his life alert button and EMS brought him to the ED. Currently, pt states he feels better than he did on admission. He reports weakness, chills, and decreased appetite for past two days. He denies other physical complaints including fevers, nausea/vomiting, chest pain, SOB, abd pain, dysuria, constipation/diarrhea. He reports strict compliance with his medications.     In ED, initial VS were T103.3  /59 RR17 SaO2 98% on RA. Labs notable for WBC of 22.59, ammonia of 67, lactate on VBG of 5.8. CXR was clear, RVP negative. UA grossly positive. Pt became hypotensive to 80s/50s and was started on levophed and admitted to MICU for further management. (2020 14:54)     prior hospital charts reviewed [  ]  primary team notes reviewed [  ]  other consultant notes reviewed [  ]    PAST MEDICAL & SURGICAL HISTORY:  GIB (gastrointestinal bleeding)  GERD with esophagitis: Gastritis &amp; Non Bleeding Ulcers  Hepatic encephalopathy  Obesity  Fatty liver disease, nonalcoholic  Renal stones: 25 years ago  Hypertension  Neuropathy  Hypercholesteremia  Diabetes  S/P cholecystectomy    Allergies  codeine (Anaphylaxis)    ANTIMICROBIALS (past 90 days)  MEDICATIONS  (STANDING):  meropenem  IVPB   100 mL/Hr IV Intermittent (20 @ 14:07)    piperacillin/tazobactam IVPB.   200 mL/Hr IV Intermittent (20 @ 10:46)      ANTIMICROBIALS:    meropenem  IVPB 500 every 8 hours  rifAXIMin 550 two times a day    OTHER MEDS: MEDICATIONS  (STANDING):  dextrose 40% Gel 15 once PRN  dextrose 50% Injectable 12.5 once  dextrose 50% Injectable 25 once  dextrose 50% Injectable 25 once  enoxaparin Injectable 40 daily  glucagon  Injectable 1 once PRN  insulin glargine Injectable (LANTUS) 12 at bedtime  insulin lispro (HumaLOG) corrective regimen sliding scale  three times a day before meals  lactulose Syrup 20 three times a day  midodrine 20 every 8 hours  norepinephrine Infusion 0.05 <Continuous>  pantoprazole    Tablet 40 two times a day    SOCIAL HISTORY:   hx smoking  non-smoker    FAMILY HISTORY:  Family history of type 2 diabetes mellitus  Family history of hypertension  Family history of stomach cancer    REVIEW OF SYSTEMS  [  ] ROS unobtainable because:    [  ] All other systems negative except as noted below:	    Constitutional:  [ ] fever [ ] chills  [ ] weight loss  [ ] weakness  Skin:  [ ] rash [ ] phlebitis	  Eyes: [ ] icterus [ ] pain  [ ] discharge	  ENMT: [ ] sore throat  [ ] thrush [ ] ulcers [ ] exudates  Respiratory: [ ] dyspnea [ ] hemoptysis [ ] cough [ ] sputum	  Cardiovascular:  [ ] chest pain [ ] palpitations [ ] edema	  Gastrointestinal:  [ ] nausea [ ] vomiting [ ] diarrhea [ ] constipation [ ] pain	  Genitourinary:  [ ] dysuria [ ] frequency [ ] hematuria [ ] discharge [ ] flank pain  [ ] incontinence  Musculoskeletal:  [ ] myalgias [ ] arthralgias [ ] arthritis  [ ] back pain  Neurological:  [ ] headache [ ] seizures  [ ] confusion/altered mental status  Psychiatric:  [ ] anxiety [ ] depression	  Hematology/Lymphatics:  [ ] lymphadenopathy  Endocrine:  [ ] adrenal [ ] thyroid  Allergic/Immunologic:	 [ ] transplant [ ] seasonal    Vital Signs Last 24 Hrs  T(F): 103.3 (20 @ 10:33), Max: 103.3 (20 @ 10:33)  Vital Signs Last 24 Hrs  HR: 116 (20 @ 14:15) (104 - 116)  BP: 135/56 (20 @ 14:15) (80/47 - 135/56)  RR: 23 (20 @ 14:15)  SpO2: 97% (20 @ 14:15) (96% - 100%)  Wt(kg): --    EXAM:  Constitutional: Not in acute distress  Eyes: pupils bilaterally reactive to light. No icterus.  Oral cavity: Clear, no lesions  Neck: No neck vein distension noted  RS: Chest clear to auscultation bilaterally. No wheeze/rhonchi/crepitations.  CVS: S1, S2 heard. Regular rate and rhythm. No murmurs/rubs/gallops.  Abdomen: Soft. No guarding/rigidity/tenderness.  : No acute abnormalities  Extremities: Warm. No pedal edema  Skin: No lesions noted  Vascular: No evidence of phlebitis  Neuro: Alert, oriented to time/place/person                          9.8    22.59 )-----------( 107      ( 2020 11:10 )             29.6         130<L>  |  95<L>  |  19  ----------------------------<  128<H>  4.2   |  21<L>  |  1.24    Ca    9.6      2020 11:10    TPro  7.0  /  Alb  2.7<L>  /  TBili  11.0<H>  /  DBili  2.9<H>  /  AST  25  /  ALT  27  /  AlkPhos  161<H>      Urinalysis Basic - ( 2020 11:32 )    Color: Orange / Appearance: Turbid / S.018 / pH: x  Gluc: x / Ketone: Negative  / Bili: Small / Urobili: 3 mg/dL   Blood: x / Protein: 100 / Nitrite: Negative   Leuk Esterase: Large / RBC: 24 /hpf /  /HPF   Sq Epi: x / Non Sq Epi: 0 /hpf / Bacteria: Many    MICROBIOLOGY:        CMV IgG Antibody: <0.20 U/mL (19 @ 08:33)  Toxoplasma IgG Screen: <3.0 IU/mL (19 @ 08:33)    Rapid RVP Result: NotDetec ( @ 11:32)        RADIOLOGY:  imaging below personally reviewed  < from: CT Abdomen and Pelvis w/ IV Cont (20 @ 14:30) >  IMPRESSION:   Emphysematous cystitis.   Cirrhosis.  Nonobstructing left renal calculus.  < end of copied text >    < from: Xray Chest 1 View AP/PA (20 @ 10:48) >  Clear lungs  < end of copied text >      OTHER TESTS: Patient is a 64y old  Male who presents with a chief complaint of   HPI:  Pt is a 64M with IDDM, dyslipidemia, obesity, GERD, HFpEF with mild LV diastolic dysfunction, and decompensated JEAN cirrhosis complicated by ascites, history of SBP, hepatic encephalopathy, and chronic anemia with a history of duodenal ulcer as well as GAVE and duodenal AVM s/p APC (last on 10/11/19), who is UNOS listed for liver transplantation at [facility] who presents with confusion, weakness, and fall at home. Pt has felt worsening weakness and lethargy for past two days. At 7AM of the day of admission, pt sustained a fall at home out of bed. He pressed his life alert button and EMS brought him to the ED. Currently, pt states he feels better than he did on admission. He reports weakness, chills, and decreased appetite for past two days. He denies other physical complaints including fevers, nausea/vomiting, chest pain, SOB, abd pain, dysuria, constipation/diarrhea. He reports strict compliance with his medications.     In ED, initial VS were T103.3  /59 RR17 SaO2 98% on RA. Labs notable for WBC of 22.59, ammonia of 67, lactate on VBG of 5.8. CXR was clear, RVP negative. UA grossly positive. Pt became hypotensive to 80s/50s and was started on levophed and admitted to MICU for further management. (2020 14:54)  -------------  - ID consulted for abx recs  - Above HPI reviewed and reconciled with patient  - reports weakness, chills since yesterday and decreased appetite 1 week back  - sick contact reported - son (flu-like symptoms)    prior hospital charts reviewed [x]  primary team notes reviewed [x]  other consultant notes reviewed [x]    PAST MEDICAL & SURGICAL HISTORY:  GIB (gastrointestinal bleeding)  GERD with esophagitis: Gastritis &amp; Non Bleeding Ulcers  Hepatic encephalopathy  Obesity  Fatty liver disease, nonalcoholic  Renal stones: 25 years ago  Hypertension  Neuropathy  Hypercholesteremia  Diabetes  S/P cholecystectomy    Allergies  codeine (Anaphylaxis)    ANTIMICROBIALS (past 90 days)  MEDICATIONS  (STANDING):  meropenem  IVPB   100 mL/Hr IV Intermittent (20 @ 14:07)    piperacillin/tazobactam IVPB.   200 mL/Hr IV Intermittent (20 @ 10:46)      ANTIMICROBIALS:    meropenem  IVPB 500 every 8 hours  rifAXIMin 550 two times a day    OTHER MEDS: MEDICATIONS  (STANDING):  dextrose 40% Gel 15 once PRN  dextrose 50% Injectable 12.5 once  dextrose 50% Injectable 25 once  dextrose 50% Injectable 25 once  enoxaparin Injectable 40 daily  glucagon  Injectable 1 once PRN  insulin glargine Injectable (LANTUS) 12 at bedtime  insulin lispro (HumaLOG) corrective regimen sliding scale  three times a day before meals  lactulose Syrup 20 three times a day  midodrine 20 every 8 hours  norepinephrine Infusion 0.05 <Continuous>  pantoprazole    Tablet 40 two times a day    SOCIAL HISTORY:   - former alcohol use  - denied smoking/recreational drug use    FAMILY HISTORY:  Family history of type 2 diabetes mellitus  Family history of hypertension  Family history of stomach cancer    REVIEW OF SYSTEMS  [  ] ROS unobtainable because:    [x] All other systems negative except as noted below:	  Constitutional:  [ ] fever [ ] chills  [ ] weight loss  [x] weakness  Skin:  [ ] rash [ ] phlebitis	  Eyes: [ ] icterus [ ] pain  [ ] discharge	  ENMT: [ ] sore throat  [ ] thrush [ ] ulcers [ ] exudates  Respiratory: [ ] dyspnea [ ] hemoptysis [ ] cough [ ] sputum	  Cardiovascular:  [ ] chest pain [ ] palpitations [ ] edema	  Gastrointestinal:  [ ] nausea [ ] vomiting [ ] diarrhea [ ] constipation [ ] pain [x] anorexia  Genitourinary:  [ ] dysuria [ ] frequency [ ] hematuria [ ] discharge [ ] flank pain  [ ] incontinence  Musculoskeletal:  [ ] myalgias [ ] arthralgias [ ] arthritis  [ ] back pain  Neurological:  [ ] headache [ ] seizures  [ ] confusion/altered mental status  Psychiatric:  [ ] anxiety [ ] depression	  Hematology/Lymphatics:  [ ] lymphadenopathy  Endocrine:  [ ] adrenal [ ] thyroid  Allergic/Immunologic:	 [ ] transplant [ ] seasonal    Vital Signs Last 24 Hrs  T(F): 103.3 (20 @ 10:33), Max: 103.3 (20 @ 10:33)  Vital Signs Last 24 Hrs  HR: 116 (20 @ 14:15) (104 - 116)  BP: 135/56 (20 @ 14:15) (80/47 - 135/56)  RR: 23 (20 @ 14:15)  SpO2: 97% (20 @ 14:15) (96% - 100%)  Wt(kg): --    EXAM:  Constitutional: Not in acute distress  Eyes: pupils bilaterally reactive to light. Scleral and entire body icterus.  Oral cavity: Clear, no lesions  Neck: No neck vein distension noted  RS: Chest clear to auscultation bilaterally. No wheeze/rhonchi/crepitations.  CVS: S1, S2 heard. Tachycardic. ?ESM heard. No rubs/gallops.  Abdomen: Soft. No guarding/rigidity/tenderness. No CVA tenderness  : No acute abnormalities  Extremities: Warm. B/l LE pedal edema  Skin: No lesions noted  Vascular: No evidence of phlebitis  Neuro: Alert, oriented to time/place/person. Mild asterixis noted                          9.8    22.59 )-----------( 107      ( 2020 11:10 )             29.6         130<L>  |  95<L>  |  19  ----------------------------<  128<H>  4.2   |  21<L>  |  1.24    Ca    9.6      2020 11:10    TPro  7.0  /  Alb  2.7<L>  /  TBili  11.0<H>  /  DBili  2.9<H>  /  AST  25  /  ALT  27  /  AlkPhos  161<H>      Urinalysis Basic - ( 2020 11:32 )    Color: Orange / Appearance: Turbid / S.018 / pH: x  Gluc: x / Ketone: Negative  / Bili: Small / Urobili: 3 mg/dL   Blood: x / Protein: 100 / Nitrite: Negative   Leuk Esterase: Large / RBC: 24 /hpf /  /HPF   Sq Epi: x / Non Sq Epi: 0 /hpf / Bacteria: Many    MICROBIOLOGY:        CMV IgG Antibody: <0.20 U/mL (19 @ 08:33)  Toxoplasma IgG Screen: <3.0 IU/mL (19 @ 08:33)    Rapid RVP Result: NotDetec ( @ 11:32)        RADIOLOGY:  imaging below personally reviewed  < from: CT Abdomen and Pelvis w/ IV Cont (20 @ 14:30) >  IMPRESSION:   Emphysematous cystitis.   Cirrhosis.  Nonobstructing left renal calculus.  < end of copied text >    < from: Xray Chest 1 View AP/PA (20 @ 10:48) >  Clear lungs  < end of copied text >      OTHER TESTS: Patient is a 64y old  Male who presents with a chief complaint of   HPI:  Pt is a 64M with IDDM, dyslipidemia, obesity, GERD, HFpEF with mild LV diastolic dysfunction, and decompensated JEAN cirrhosis complicated by ascites, history of SBP, hepatic encephalopathy, and chronic anemia with a history of duodenal ulcer as well as GAVE and duodenal AVM s/p APC (last on 10/11/19), who is UNOS listed for liver transplantation at [facility] who presents with confusion, weakness, and fall at home. Pt has felt worsening weakness and lethargy for past two days. At 7AM of the day of admission, pt sustained a fall at home out of bed. He pressed his life alert button and EMS brought him to the ED. Currently, pt states he feels better than he did on admission. He reports weakness, chills, and decreased appetite for past two days. He denies other physical complaints including fevers, nausea/vomiting, chest pain, SOB, abd pain, dysuria, constipation/diarrhea. He reports strict compliance with his medications.     In ED, initial VS were T103.3  /59 RR17 SaO2 98% on RA. Labs notable for WBC of 22.59, ammonia of 67, lactate on VBG of 5.8. CXR was clear, RVP negative. UA grossly positive. Pt became hypotensive to 80s/50s and was started on levophed and admitted to MICU for further management. (2020 14:54)  -------------  - ID consulted for abx recs  - Above HPI reviewed and reconciled with patient  - reports weakness, chills since yesterday and decreased appetite 1 week back  - sick contact reported - son (flu-like symptoms)    prior hospital charts reviewed [x]  primary team notes reviewed [x]  other consultant notes reviewed [x]    PAST MEDICAL & SURGICAL HISTORY:  GIB (gastrointestinal bleeding)  GERD with esophagitis: Gastritis &amp; Non Bleeding Ulcers  Hepatic encephalopathy  Obesity  Fatty liver disease, nonalcoholic  Renal stones: 25 years ago  Hypertension  Neuropathy  Hypercholesteremia  Diabetes  S/P cholecystectomy    Allergies  codeine (Anaphylaxis)    ANTIMICROBIALS (past 90 days)  MEDICATIONS  (STANDING):  meropenem  IVPB   100 mL/Hr IV Intermittent (20 @ 14:07)    piperacillin/tazobactam IVPB.   200 mL/Hr IV Intermittent (20 @ 10:46)      ANTIMICROBIALS:    meropenem  IVPB 500 every 8 hours  rifAXIMin 550 two times a day    OTHER MEDS: MEDICATIONS  (STANDING):  dextrose 40% Gel 15 once PRN  dextrose 50% Injectable 12.5 once  dextrose 50% Injectable 25 once  dextrose 50% Injectable 25 once  enoxaparin Injectable 40 daily  glucagon  Injectable 1 once PRN  insulin glargine Injectable (LANTUS) 12 at bedtime  insulin lispro (HumaLOG) corrective regimen sliding scale  three times a day before meals  lactulose Syrup 20 three times a day  midodrine 20 every 8 hours  norepinephrine Infusion 0.05 <Continuous>  pantoprazole    Tablet 40 two times a day    SOCIAL HISTORY:   - former alcohol use  - denied smoking/recreational drug use    FAMILY HISTORY:  Family history of type 2 diabetes mellitus  Family history of hypertension  Family history of stomach cancer    REVIEW OF SYSTEMS  [  ] ROS unobtainable because:    [x] All other systems negative except as noted below:	  Constitutional:  [ ] fever [ ] chills  [ ] weight loss  [x] weakness  Skin:  [ ] rash [ ] phlebitis	  Eyes: [ ] icterus [ ] pain  [ ] discharge	  ENMT: [ ] sore throat  [ ] thrush [ ] ulcers [ ] exudates  Respiratory: [ ] dyspnea [ ] hemoptysis [ ] cough [ ] sputum	  Cardiovascular:  [ ] chest pain [ ] palpitations [ ] edema	  Gastrointestinal:  [ ] nausea [ ] vomiting [ ] diarrhea [ ] constipation [ ] pain [x] anorexia  Genitourinary:  [ ] dysuria [ ] frequency [ ] hematuria [ ] discharge [ ] flank pain  [ ] incontinence  Musculoskeletal:  [ ] myalgias [ ] arthralgias [ ] arthritis  [ ] back pain  Neurological:  [ ] headache [ ] seizures  [ ] confusion/altered mental status  Psychiatric:  [ ] anxiety [ ] depression	  Hematology/Lymphatics:  [ ] lymphadenopathy  Endocrine:  [ ] adrenal [ ] thyroid  Allergic/Immunologic:	 [ ] transplant [ ] seasonal    Vital Signs Last 24 Hrs  T(F): 103.3 (20 @ 10:33), Max: 103.3 (20 @ 10:33)  Vital Signs Last 24 Hrs  HR: 116 (20 @ 14:15) (104 - 116)  BP: 135/56 (20 @ 14:15) (80/47 - 135/56)  RR: 23 (20 @ 14:15)  SpO2: 97% (20 @ 14:15) (96% - 100%)  Wt(kg): --    EXAM:  Constitutional: Not in acute distress  Eyes: pupils bilaterally reactive to light. Scleral and entire body icterus.  Oral cavity: Clear, no lesions  Neck: No neck vein distension noted  RS: Chest clear to auscultation bilaterally. No wheeze/rhonchi/crepitations.  CVS: S1, S2 heard. Tachycardic. ?ESM heard. No rubs/gallops.  Abdomen: Soft. No guarding/rigidity/tenderness. No CVA tenderness  : No acute abnormalities  Extremities: Warm. B/l LE pedal edema  Skin: No lesions noted  Vascular: No evidence of phlebitis  Neuro: Alert, oriented to time/place/person. Mild asterixis noted                          9.8    22.59 )-----------( 107      ( 2020 11:10 )             29.6         130<L>  |  95<L>  |  19  ----------------------------<  128<H>  4.2   |  21<L>  |  1.24    Ca    9.6      2020 11:10    TPro  7.0  /  Alb  2.7<L>  /  TBili  11.0<H>  /  DBili  2.9<H>  /  AST  25  /  ALT  27  /  AlkPhos  161<H>      Urinalysis Basic - ( 2020 11:32 )    Color: Orange / Appearance: Turbid / S.018 / pH: x  Gluc: x / Ketone: Negative  / Bili: Small / Urobili: 3 mg/dL   Blood: x / Protein: 100 / Nitrite: Negative   Leuk Esterase: Large / RBC: 24 /hpf /  /HPF   Sq Epi: x / Non Sq Epi: 0 /hpf / Bacteria: Many    MICROBIOLOGY:        CMV IgG Antibody: <0.20 U/mL (19 @ 08:33)  Toxoplasma IgG Screen: <3.0 IU/mL (19 @ 08:33)    Rapid RVP Result: NotDetec ( @ 11:32)        RADIOLOGY:  imaging below personally reviewed and visualized by me.     < from: CT Abdomen and Pelvis w/ IV Cont (20 @ 14:30) >  IMPRESSION:   Emphysematous cystitis.   Cirrhosis.  Nonobstructing left renal calculus.  < end of copied text >    < from: Xray Chest 1 View AP/PA (20 @ 10:48) >  Clear lungs  < end of copied text >      OTHER TESTS: Patient is a 64y old  Male who presents with a chief complaint of     HPI:    64M with IDDM, dyslipidemia, obesity, GERD, HFpEF with mild LV diastolic dysfunction, and decompensated JEAN cirrhosis complicated by ascites, history of SBP, hepatic encephalopathy, and chronic anemia with a history of duodenal ulcer as well as GAVE and duodenal AVM s/p APC (last on 10/11/19), who is UNOS listed for liver transplantation at [facility] who presents with confusion, weakness, and fall at home. Pt has felt worsening weakness and lethargy for past two days. At 7AM of the day of admission, pt sustained a fall at home out of bed. He pressed his life alert button and EMS brought him to the ED. Currently, pt states he feels better than he did on admission. He reports weakness, chills, and decreased appetite for past two days. He denies other physical complaints including fevers, nausea/vomiting, chest pain, SOB, abd pain, dysuria, constipation/diarrhea. He reports strict compliance with his medications.     In ED, initial VS were T103.3  /59 RR17 SaO2 98% on RA. Labs notable for WBC of 22.59, ammonia of 67, lactate on VBG of 5.8. CXR was clear, RVP negative. UA grossly positive. Pt became hypotensive to 80s/50s and was started on levophed and admitted to MICU for further management. (2020 14:54)  -------------  - ID consulted for abx recs  - Above HPI reviewed and reconciled with patient  - reports weakness, chills since yesterday and decreased appetite 1 week back  - sick contact reported - son (flu-like symptoms)    prior hospital charts reviewed [x]  primary team notes reviewed [x]  other consultant notes reviewed [x]    PAST MEDICAL & SURGICAL HISTORY:  GIB (gastrointestinal bleeding)  GERD with esophagitis: Gastritis &amp; Non Bleeding Ulcers  Hepatic encephalopathy  Obesity  Fatty liver disease, nonalcoholic  Renal stones: 25 years ago  Hypertension  Neuropathy  Hypercholesteremia  Diabetes  S/P cholecystectomy    Allergies  codeine (Anaphylaxis)    ANTIMICROBIALS (past 90 days)  MEDICATIONS  (STANDING):  meropenem  IVPB   100 mL/Hr IV Intermittent (20 @ 14:07)    piperacillin/tazobactam IVPB.   200 mL/Hr IV Intermittent (20 @ 10:46)      ANTIMICROBIALS:    meropenem  IVPB 500 every 8 hours  rifAXIMin 550 two times a day    OTHER MEDS: MEDICATIONS  (STANDING):  dextrose 40% Gel 15 once PRN  dextrose 50% Injectable 12.5 once  dextrose 50% Injectable 25 once  dextrose 50% Injectable 25 once  enoxaparin Injectable 40 daily  glucagon  Injectable 1 once PRN  insulin glargine Injectable (LANTUS) 12 at bedtime  insulin lispro (HumaLOG) corrective regimen sliding scale  three times a day before meals  lactulose Syrup 20 three times a day  midodrine 20 every 8 hours  norepinephrine Infusion 0.05 <Continuous>  pantoprazole    Tablet 40 two times a day    SOCIAL HISTORY:   - former alcohol use  - denied smoking/recreational drug use    FAMILY HISTORY:  Family history of type 2 diabetes mellitus  Family history of hypertension  Family history of stomach cancer    REVIEW OF SYSTEMS  [  ] ROS unobtainable because:    [x] All other systems negative except as noted below:	    Constitutional:  [ ] fever [ ] chills  [ ] weight loss  [x] weakness  Skin:  [ ] rash [ ] phlebitis	  Eyes: [ ] icterus [ ] pain  [ ] discharge	  ENMT: [ ] sore throat  [ ] thrush [ ] ulcers [ ] exudates  Respiratory: [ ] dyspnea [ ] hemoptysis [ ] cough [ ] sputum	  Cardiovascular:  [ ] chest pain [ ] palpitations [ ] edema	  Gastrointestinal:  [ ] nausea [ ] vomiting [ ] diarrhea [ ] constipation [ ] pain [x] anorexia  Genitourinary:  [ ] dysuria [ ] frequency [ ] hematuria [ ] discharge [ ] flank pain  [ ] incontinence  Musculoskeletal:  [ ] myalgias [ ] arthralgias [ ] arthritis  [ ] back pain  Neurological:  [ ] headache [ ] seizures  [ ] confusion/altered mental status  Psychiatric:  [ ] anxiety [ ] depression	  Hematology/Lymphatics:  [ ] lymphadenopathy  Endocrine:  [ ] adrenal [ ] thyroid  Allergic/Immunologic:	 [ ] transplant [ ] seasonal    Vital Signs Last 24 Hrs  T(F): 103.3 (20 @ 10:33), Max: 103.3 (20 @ 10:33)  Vital Signs Last 24 Hrs  HR: 116 (20 @ 14:15) (104 - 116)  BP: 135/56 (20 @ 14:15) (80/47 - 135/56)  RR: 23 (20 @ 14:15)  SpO2: 97% (20 @ 14:15) (96% - 100%)  Wt(kg): --    EXAM:  Constitutional: Not in acute distress  Eyes: pupils bilaterally reactive to light. Scleral and entire body icterus.  Oral cavity: Clear, no lesions  Neck: No neck vein distension noted  RS: Chest clear to auscultation bilaterally. No wheeze/rhonchi/crepitations.  CVS: S1, S2 heard. Tachycardic. ?ESM heard. No rubs/gallops.  Abdomen: Soft. No guarding/rigidity/tenderness. No CVA tenderness  : No acute abnormalities  Extremities: Warm. B/l LE pedal edema  Skin: No lesions noted  Vascular: No evidence of phlebitis  Neuro: Alert, oriented to time/place/person. Mild asterixis noted                          9.8    22.59 )-----------( 107      ( 2020 11:10 )             29.6         130<L>  |  95<L>  |  19  ----------------------------<  128<H>  4.2   |  21<L>  |  1.24    Ca    9.6      2020 11:10    TPro  7.0  /  Alb  2.7<L>  /  TBili  11.0<H>  /  DBili  2.9<H>  /  AST  25  /  ALT  27  /  AlkPhos  161<H>      Urinalysis Basic - ( 2020 11:32 )    Color: Orange / Appearance: Turbid / S.018 / pH: x  Gluc: x / Ketone: Negative  / Bili: Small / Urobili: 3 mg/dL   Blood: x / Protein: 100 / Nitrite: Negative   Leuk Esterase: Large / RBC: 24 /hpf /  /HPF   Sq Epi: x / Non Sq Epi: 0 /hpf / Bacteria: Many    MICROBIOLOGY:        CMV IgG Antibody: <0.20 U/mL (19 @ 08:33)  Toxoplasma IgG Screen: <3.0 IU/mL (19 @ 08:33)    Rapid RVP Result: NotDetec ( @ 11:32)        RADIOLOGY:  <The imaging below has been reviewed and visualized by me independently. Findings as detailed in report below>    < from: CT Abdomen and Pelvis w/ IV Cont (20 @ 14:30) >  IMPRESSION:   Emphysematous cystitis.   Cirrhosis.  Nonobstructing left renal calculus.  < end of copied text >    < from: Xray Chest 1 View AP/PA (20 @ 10:48) >  Clear lungs  < end of copied text >      OTHER TESTS:

## 2020-02-11 NOTE — ED PROVIDER NOTE - OBJECTIVE STATEMENT
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 Noted to have fallen out of bed this AM, unwitnessed, no syncope, was put back into bed by police after calling via alert bracelet. Found to be febrile, tachycardic in ED. Patient has no pain, no complaints at this time.

## 2020-02-11 NOTE — H&P ADULT - ASSESSMENT
Pt is a 64M with IDDM, dyslipidemia, obesity, GERD, HFpEF with mild LV diastolic dysfunction, and decompensated JEAN cirrhosis complicated by ascites, history of SBP, hepatic encephalopathy, and chronic anemia with a history of duodenal ulcer as well as GAVE and duodenal AVM s/p APC (last on 10/11/19), who is UNOS listed for liver transplantation at [facility] who presents with confusion, weakness, and fall at home found to be in septic shock 2/2 UTI with possible SBP.    #Neuro  - currently A&Ox2-3, adamantly denies falling at home despite family at bedside stating otherwise.   - ammonia level 67 on admission, slightly higher than prior admission in 12/2019  - c/w rifaximin, lactulose for hepatic encephalopathy  - cont to monitor mental status    #CV  - Started on levophed in ED for BP of 80s/50s, most likely septic shock from UTI with possible SBP. Recevied 2.4L fluid resuscitation in ED. Systolic BP currently in 140s systolic in ED. Will wean off levo as tolerated  - on midodrine 20mg Q8H per most recent hepatology note in 12/2019, will continue and wean off IV pressors  - TTE from 12/2019 with EF70%, hyperdynamic LV systolic function, grossly normal RV systolic function    #Pulm  - saturating well on RA  - no active issues    #ID  - pt presented in septic shock with T103.3, OF271j, and hypotension refractory to sufficient fluid resuscitation. Lactate 5.8 on admission. Source likely UTI with possible SBP. Pt has hx of ESBL UTI in 12/2019, will c/w meropenem   - Underwent CT a/p with IV contrast for possible intraabdominal source of infection  - pt will need diagnostic paracentesis, will assess ability to perform at bedside upon arrival in MICU  - f/u urine and blood cultures  - wean off vasopressors as tolerated    #GI  - Pt has hx of JEAN cirrhosis with recent hospitalization in 12/2019 for acute decompensated liver cirrhosis and ESBL UTI. MELD-Na on admission 25  - Endoscopy in 12/2019 with small, nonbleeding varces, not banded, gastric vascular ectasias w/o bleeding, and portal hypertensive gastropathy. Will c/w pantoprazole QD  - will c/s hepatology for further recs  - will hold home spironolactone and lasix in setting of septic shock/hypotension. Add back as clinically tolerate  - regular diet    #Heme  - Hemoglobin 9.8 on admission. Pt has chronic anemia (baseline 7-8s) due to chronic occult GI blood loss related to known GAVE, PHG, and AVMs, as well as possible spur cell anemia related to his end-stage liver disease  - no signs/sx of active bleeding on exam  - will cont to monitor CBC QD    #Renal  - Creatinine 1.24 on admission, baseline appears to be 1.0-1.1 from previous admission  - renally dose all meds, avoid nephrotoxins  - BMP QD    #Endo  - Pt has IDDM; A1C 5.8 in 12/2019. On Lantus 24units and humalog 8units TID before meals at home. Glucose in 120s on BMP  - will c/w lantus 12 units at night and ISS    #PPx  - will hold DVT ppx for paracentesis  - Full code    CK  PGY3 MICU  Spectra [id number] Pt is a 64M with IDDM, dyslipidemia, obesity, GERD, HFpEF with mild LV diastolic dysfunction, and decompensated JEAN cirrhosis complicated by ascites, history of SBP, hepatic encephalopathy, and chronic anemia with a history of duodenal ulcer as well as GAVE and duodenal AVM s/p APC (last on 10/11/19), who is UNOS listed for liver transplantation at [facility] who presents with confusion, weakness, and fall at home found to be in septic shock 2/2 UTI with possible SBP.    #Neuro  - currently A&Ox2-3, adamantly denies falling at home despite family at bedside stating otherwise.   - ammonia level 67 on admission, slightly higher than prior admission in 12/2019  - c/w rifaximin, lactulose for hepatic encephalopathy  - cont to monitor mental status    #CV  - Started on levophed in ED for BP of 80s/50s, most likely septic shock from UTI with possible SBP. Recevied 2.4L fluid resuscitation in ED. Systolic BP currently in 140s systolic in ED. Will wean off levo as tolerated  - on midodrine 20mg Q8H per most recent hepatology note in 12/2019, will continue and wean off IV pressors  - TTE from 12/2019 with EF70%, hyperdynamic LV systolic function, grossly normal RV systolic function    #Pulm  - saturating well on RA  - no active issues    #ID  - pt presented in septic shock with T103.3, [id number], and hypotension refractory to sufficient fluid resuscitation. Lactate 5.8 on admission. Source likely UTI with possible SBP. Pt has hx of ESBL UTI in 12/2019, will c/w meropenem   - Underwent CT a/p with IV contrast for possible intraabdominal source of infection  - pt will need diagnostic paracentesis, will assess ability to perform at bedside upon arrival in MICU  - f/u urine and blood cultures  - wean off vasopressors as tolerated    #GI  - Pt has hx of JEAN cirrhosis with recent hospitalization in 12/2019 for acute decompensated liver cirrhosis and ESBL UTI. MELD-Na on admission 25  - Endoscopy in 12/2019 with small, nonbleeding varces, not banded, gastric vascular ectasias w/o bleeding, and portal hypertensive gastropathy. Will c/w pantoprazole 40 BID  - will c/s hepatology for further recs  - will hold home spironolactone and lasix in setting of septic shock/hypotension. Add back as clinically tolerate  - regular diet    #Heme  - Hemoglobin 9.8 on admission. Pt has chronic anemia (baseline 7-8s) due to chronic occult GI blood loss related to known GAVE, PHG, and AVMs, as well as possible spur cell anemia related to his end-stage liver disease  - no signs/sx of active bleeding on exam  - will cont to monitor CBC QD    #Renal  - Creatinine 1.24 on admission, baseline appears to be 1.0-1.1 from previous admission  - renally dose all meds, avoid nephrotoxins  - BMP QD    #Endo  - Pt has IDDM; A1C 5.8 in 12/2019. On Lantus 24units and humalog 8units TID before meals at home. Glucose in 120s on BMP  - will c/w lantus 12 units at night and ISS    #PPx  - will hold DVT ppx for paracentesis  - Full code    CK  PGY3 MICU  Spectra [id number] Pt is a 64M with IDDM, dyslipidemia, obesity, GERD, HFpEF with mild LV diastolic dysfunction, and decompensated JEAN cirrhosis complicated by ascites, history of SBP, hepatic encephalopathy, and chronic anemia with a history of duodenal ulcer as well as GAVE and duodenal AVM s/p APC (last on 10/11/19), who is UNOS listed for liver transplantation at [facility] who presents with confusion, weakness, and fall at home found to be in septic shock 2/2 UTI with possible SBP.    #Neuro  - currently A&Ox2-3, adamantly denies falling at home despite family at bedside stating otherwise.   - ammonia level 67 on admission, slightly higher than prior admission in 12/2019  - c/w rifaximin, lactulose for hepatic encephalopathy  - cont to monitor mental status    #CV  - Started on levophed in ED for BP of 80s/50s, most likely septic shock from UTI with possible SBP. Recevied 2.4L fluid resuscitation in ED. Systolic BP currently in 140s systolic in ED. Will wean off levo as tolerated  - on midodrine 20mg Q8H per most recent hepatology note in 12/2019, will continue and wean off IV pressors  - TTE from 12/2019 with EF70%, hyperdynamic LV systolic function, grossly normal RV systolic function    #Pulm  - saturating well on RA  - no active issues    #ID  - pt presented in septic shock with T103.3, SS709r, and hypotension refractory to sufficient fluid resuscitation. Lactate 5.8 on admission. Source likely UTI with possible SBP. Pt has hx of ESBL UTI in 12/2019, will c/w meropenem   - Underwent CT a/p with IV contrast for possible intraabdominal source of infection  - pt will need diagnostic paracentesis, will assess ability to perform at bedside upon arrival in MICU  - f/u urine and blood cultures  - wean off vasopressors as tolerated    #GI  - Pt has hx of JEAN cirrhosis with recent hospitalization in 12/2019 for acute decompensated liver cirrhosis and ESBL UTI. MELD-Na on admission 25  - Endoscopy in 12/2019 with small, nonbleeding varces, not banded, gastric vascular ectasias w/o bleeding, and portal hypertensive gastropathy. Will c/w pantoprazole 40 BID  - will c/s hepatology for further recs  - will hold home spironolactone and lasix in setting of septic shock/hypotension. Add back as clinically tolerate  - consistent carb diet    #Heme  - Hemoglobin 9.8 on admission. Pt has chronic anemia (baseline 7-8s) due to chronic occult GI blood loss related to known GAVE, PHG, and AVMs, as well as possible spur cell anemia related to his end-stage liver disease  - no signs/sx of active bleeding on exam  - will cont to monitor CBC QD    #Renal  - Creatinine 1.24 on admission, baseline appears to be 1.0-1.1 from previous admission  - renally dose all meds, avoid nephrotoxins  - BMP QD    #Endo  - Pt has IDDM; A1C 5.8 in 12/2019. On Lantus 24units and humalog 8units TID before meals at home. Glucose in 120s on BMP  - will c/w lantus 12 units at night and ISS    #PPx  - dvt ppx: lovenox  - Full code    CK  PGY3 MICU  Spectra [id number]

## 2020-02-11 NOTE — ED ADULT NURSE NOTE - NSFALLRSKASSESSDT_ED_ALL_ED
Was the patient seen in the last year in this department? Yes     Does patient have an active prescription for medications requested? No Pt needs this sent to a local pharmacy     Received Request Via: Patient  
11-Feb-2020 10:55

## 2020-02-11 NOTE — H&P ADULT - ATTENDING COMMENTS
Agree with above. Patient seen and examined. Patient with complex medical history including decompensated JEAN cirrhosis with history of SBP, hepatic encephalopathy, DM2, HFpEF with diastolic dysfunction, anemia with history of duodenal ulcer, duodenal AVM s/p APC, and varices who is currently listed for liver transplantation presents with fever and hypotension.    1. Septic shock - likely in the setting of UTI. Continue vasopressors as needed to maintain MAP > 65. Will restart home Midodrine 20mg TID dose. Followup cultures. Will check bedside US to see if pocket for paracentesis to eval for SBP.  - Patient does have history of ESBL E.coli - will start Meropenem as we await cultures.  2. Decompensated Liver Failure - patient on liver transplant list.  - Hepatology evaluation  - Continue Lactulose and Rifaximin. Continue Midodrine.  - History of grade I varices  - Hold diuretics for today and monitor volume status    Patient critically ill requiring MICU level care. Will admit to MICU for further monitoring and care.  I have provided 45 minutes of critical care time independent of procedures and/or teaching services.

## 2020-02-11 NOTE — ED ADULT NURSE REASSESSMENT NOTE - NS ED NURSE REASSESS COMMENT FT1
Pt remains in the ED. Resting comfortably in bed. A&Ox3. Breathing spontaneously and unlabored. NAD. Pt has a fever. MD Mars aware. Will continue to monitor. Awaiting dispo.

## 2020-02-11 NOTE — ED ADULT NURSE NOTE - NSIMPLEMENTINTERV_GEN_ALL_ED
Implemented All Fall with Harm Risk Interventions:  [place] to call system. Call bell, personal items and telephone within reach. Instruct patient to call for assistance. Room bathroom lighting operational. Non-slip footwear when patient is off stretcher. Physically safe environment: no spills, clutter or unnecessary equipment. Stretcher in lowest position, wheels locked, appropriate side rails in place. Provide visual cue, wrist band, yellow gown, etc. Monitor gait and stability. Monitor for mental status changes and reorient to person, place, and time. Review medications for side effects contributing to fall risk. Reinforce activity limits and safety measures with patient and family. Provide visual clues: red socks.

## 2020-02-12 DIAGNOSIS — E11.9 TYPE 2 DIABETES MELLITUS WITHOUT COMPLICATIONS: ICD-10-CM

## 2020-02-12 DIAGNOSIS — K72.90 HEPATIC FAILURE, UNSPECIFIED WITHOUT COMA: ICD-10-CM

## 2020-02-12 DIAGNOSIS — I10 ESSENTIAL (PRIMARY) HYPERTENSION: ICD-10-CM

## 2020-02-12 DIAGNOSIS — Z29.9 ENCOUNTER FOR PROPHYLACTIC MEASURES, UNSPECIFIED: ICD-10-CM

## 2020-02-12 DIAGNOSIS — K74.69 OTHER CIRRHOSIS OF LIVER: ICD-10-CM

## 2020-02-12 DIAGNOSIS — R78.81 BACTEREMIA: ICD-10-CM

## 2020-02-12 LAB
ALBUMIN SERPL ELPH-MCNC: 2.1 G/DL — LOW (ref 3.3–5)
ALP SERPL-CCNC: 97 U/L — SIGNIFICANT CHANGE UP (ref 40–120)
ALT FLD-CCNC: 11 U/L — SIGNIFICANT CHANGE UP (ref 10–45)
AMMONIA BLD-MCNC: 58 UMOL/L — HIGH (ref 11–55)
ANION GAP SERPL CALC-SCNC: 11 MMOL/L — SIGNIFICANT CHANGE UP (ref 5–17)
AST SERPL-CCNC: 21 U/L — SIGNIFICANT CHANGE UP (ref 10–40)
BILIRUB SERPL-MCNC: 8.6 MG/DL — HIGH (ref 0.2–1.2)
BUN SERPL-MCNC: 24 MG/DL — HIGH (ref 7–23)
CALCIUM SERPL-MCNC: 9.1 MG/DL — SIGNIFICANT CHANGE UP (ref 8.4–10.5)
CHLORIDE SERPL-SCNC: 98 MMOL/L — SIGNIFICANT CHANGE UP (ref 96–108)
CO2 SERPL-SCNC: 23 MMOL/L — SIGNIFICANT CHANGE UP (ref 22–31)
CREAT SERPL-MCNC: 1.1 MG/DL — SIGNIFICANT CHANGE UP (ref 0.5–1.3)
E COLI DNA BLD POS QL NAA+NON-PROBE: SIGNIFICANT CHANGE UP
GLUCOSE BLDC GLUCOMTR-MCNC: 149 MG/DL — HIGH (ref 70–99)
GLUCOSE BLDC GLUCOMTR-MCNC: 176 MG/DL — HIGH (ref 70–99)
GLUCOSE BLDC GLUCOMTR-MCNC: 246 MG/DL — HIGH (ref 70–99)
GLUCOSE BLDC GLUCOMTR-MCNC: 319 MG/DL — HIGH (ref 70–99)
GLUCOSE SERPL-MCNC: 218 MG/DL — HIGH (ref 70–99)
GRAM STN FLD: SIGNIFICANT CHANGE UP
GRAM STN FLD: SIGNIFICANT CHANGE UP
HBA1C BLD-MCNC: 6.4 % — HIGH (ref 4–5.6)
HCT VFR BLD CALC: 22.5 % — LOW (ref 39–50)
HGB BLD-MCNC: 7.5 G/DL — LOW (ref 13–17)
INR BLD: 2.51 RATIO — HIGH (ref 0.88–1.16)
MAGNESIUM SERPL-MCNC: 1.6 MG/DL — SIGNIFICANT CHANGE UP (ref 1.6–2.6)
MCHC RBC-ENTMCNC: 33.3 GM/DL — SIGNIFICANT CHANGE UP (ref 32–36)
MCHC RBC-ENTMCNC: 35.4 PG — HIGH (ref 27–34)
MCV RBC AUTO: 106.1 FL — HIGH (ref 80–100)
METHOD TYPE: SIGNIFICANT CHANGE UP
NRBC # BLD: 0 /100 WBCS — SIGNIFICANT CHANGE UP (ref 0–0)
PHOSPHATE SERPL-MCNC: 2.7 MG/DL — SIGNIFICANT CHANGE UP (ref 2.5–4.5)
PLATELET # BLD AUTO: 70 K/UL — LOW (ref 150–400)
POTASSIUM SERPL-MCNC: 4.1 MMOL/L — SIGNIFICANT CHANGE UP (ref 3.5–5.3)
POTASSIUM SERPL-SCNC: 4.1 MMOL/L — SIGNIFICANT CHANGE UP (ref 3.5–5.3)
PROT SERPL-MCNC: 5.1 G/DL — LOW (ref 6–8.3)
PROTHROM AB SERPL-ACNC: 29.5 SEC — HIGH (ref 10–12.9)
PSA FLD-MCNC: 0.21 NG/ML — SIGNIFICANT CHANGE UP (ref 0–4)
RBC # BLD: 2.12 M/UL — LOW (ref 4.2–5.8)
RBC # FLD: 14.6 % — HIGH (ref 10.3–14.5)
SODIUM SERPL-SCNC: 132 MMOL/L — LOW (ref 135–145)
SPECIMEN SOURCE: SIGNIFICANT CHANGE UP
SPECIMEN SOURCE: SIGNIFICANT CHANGE UP
WBC # BLD: 19.04 K/UL — HIGH (ref 3.8–10.5)
WBC # FLD AUTO: 19.04 K/UL — HIGH (ref 3.8–10.5)

## 2020-02-12 PROCEDURE — 99233 SBSQ HOSP IP/OBS HIGH 50: CPT

## 2020-02-12 PROCEDURE — 99232 SBSQ HOSP IP/OBS MODERATE 35: CPT | Mod: GC

## 2020-02-12 PROCEDURE — 99232 SBSQ HOSP IP/OBS MODERATE 35: CPT

## 2020-02-12 PROCEDURE — 99233 SBSQ HOSP IP/OBS HIGH 50: CPT | Mod: GC

## 2020-02-12 RX ORDER — SPIRONOLACTONE 25 MG/1
25 TABLET, FILM COATED ORAL DAILY
Refills: 0 | Status: DISCONTINUED | OUTPATIENT
Start: 2020-02-12 | End: 2020-02-13

## 2020-02-12 RX ORDER — ALBUMIN HUMAN 25 %
50 VIAL (ML) INTRAVENOUS EVERY 6 HOURS
Refills: 0 | Status: DISCONTINUED | OUTPATIENT
Start: 2020-02-12 | End: 2020-02-13

## 2020-02-12 RX ORDER — INSULIN LISPRO 100/ML
VIAL (ML) SUBCUTANEOUS AT BEDTIME
Refills: 0 | Status: DISCONTINUED | OUTPATIENT
Start: 2020-02-12 | End: 2020-02-21

## 2020-02-12 RX ORDER — LACTULOSE 10 G/15ML
20 SOLUTION ORAL THREE TIMES A DAY
Refills: 0 | Status: DISCONTINUED | OUTPATIENT
Start: 2020-02-12 | End: 2020-02-21

## 2020-02-12 RX ORDER — FUROSEMIDE 40 MG
40 TABLET ORAL
Refills: 0 | Status: DISCONTINUED | OUTPATIENT
Start: 2020-02-12 | End: 2020-02-13

## 2020-02-12 RX ADMIN — CHLORHEXIDINE GLUCONATE 1 APPLICATION(S): 213 SOLUTION TOPICAL at 06:00

## 2020-02-12 RX ADMIN — MEROPENEM 100 MILLIGRAM(S): 1 INJECTION INTRAVENOUS at 14:12

## 2020-02-12 RX ADMIN — Medication 50 MILLILITER(S): at 06:00

## 2020-02-12 RX ADMIN — INSULIN GLARGINE 12 UNIT(S): 100 INJECTION, SOLUTION SUBCUTANEOUS at 23:18

## 2020-02-12 RX ADMIN — Medication 50 MILLILITER(S): at 00:00

## 2020-02-12 RX ADMIN — Medication 2: at 23:18

## 2020-02-12 RX ADMIN — PANTOPRAZOLE SODIUM 40 MILLIGRAM(S): 20 TABLET, DELAYED RELEASE ORAL at 05:44

## 2020-02-12 RX ADMIN — Medication 50 MILLILITER(S): at 18:07

## 2020-02-12 RX ADMIN — SPIRONOLACTONE 25 MILLIGRAM(S): 25 TABLET, FILM COATED ORAL at 14:14

## 2020-02-12 RX ADMIN — MIDODRINE HYDROCHLORIDE 20 MILLIGRAM(S): 2.5 TABLET ORAL at 05:44

## 2020-02-12 RX ADMIN — Medication 2: at 17:44

## 2020-02-12 RX ADMIN — Medication 1 TABLET(S): at 11:57

## 2020-02-12 RX ADMIN — LACTULOSE 20 GRAM(S): 10 SOLUTION ORAL at 05:44

## 2020-02-12 RX ADMIN — Medication 50 MILLILITER(S): at 23:19

## 2020-02-12 RX ADMIN — ENOXAPARIN SODIUM 40 MILLIGRAM(S): 100 INJECTION SUBCUTANEOUS at 11:56

## 2020-02-12 RX ADMIN — Medication 1 MILLIGRAM(S): at 11:57

## 2020-02-12 RX ADMIN — MIDODRINE HYDROCHLORIDE 20 MILLIGRAM(S): 2.5 TABLET ORAL at 14:14

## 2020-02-12 RX ADMIN — MEROPENEM 100 MILLIGRAM(S): 1 INJECTION INTRAVENOUS at 06:00

## 2020-02-12 RX ADMIN — Medication 1: at 12:30

## 2020-02-12 RX ADMIN — LACTULOSE 20 GRAM(S): 10 SOLUTION ORAL at 23:18

## 2020-02-12 RX ADMIN — Medication 50 MILLILITER(S): at 11:57

## 2020-02-12 RX ADMIN — MIDODRINE HYDROCHLORIDE 20 MILLIGRAM(S): 2.5 TABLET ORAL at 23:18

## 2020-02-12 RX ADMIN — MEROPENEM 100 MILLIGRAM(S): 1 INJECTION INTRAVENOUS at 23:19

## 2020-02-12 RX ADMIN — PANTOPRAZOLE SODIUM 40 MILLIGRAM(S): 20 TABLET, DELAYED RELEASE ORAL at 18:17

## 2020-02-12 NOTE — PROGRESS NOTE ADULT - ASSESSMENT
Impression:     1. Septic shock 2/2 Ecoli bacteremia, presumedly from  source given history of ESBL in urine culture in the past.   2. Decompensated JEAN Cirrhosis, UNOS listed. Current MELD Na 29 increased from baseline of 25 in the setting of sepsis.   -encephalopathy: mild, patient is on lactulose and rifaximin at home.   -varices: last EGD showed small varices and GAVE on 12/30  -ascites: trace on CT/exam. History of SBP per chart but not found in [place]. On lasix/aldactone 40/100. No Abx PPX  -HCC not seen on most recent CT.     3. DM    Recommendation:  -agree w broad spectrum abx, follow up ID recs  -continue lactulose/xifaxan  -monitor BMs, titrate Lactulose to ensure 3-4 BMs/day  -continue with home midodrine 20mg TID   -continue albumin  -hold diuretics for now, patient appears dry on exam, is septic

## 2020-02-12 NOTE — PROGRESS NOTE ADULT - SUBJECTIVE AND OBJECTIVE BOX
Follow Up: Pre-liver transplant eval, sepsis    Interval History/ROS:Patient is a 64y old  Male who presents with a chief complaint of fevers (2020 17:57)  - no acute events reported overnight  - pt weaned off pressors  - assessed at bedside this AM. Denied any acute complaints    Allergies    codeine (Anaphylaxis)    Intolerances        ANTIMICROBIALS:  meropenem  IVPB 1000 every 8 hours  rifAXIMin 550 two times a day      OTHER MEDS:  albumin human 25% IVPB 50 milliLiter(s) IV Intermittent every 6 hours  chlorhexidine 4% Liquid 1 Application(s) Topical <User Schedule>  dextrose 40% Gel 15 Gram(s) Oral once PRN  dextrose 5%. 1000 milliLiter(s) IV Continuous <Continuous>  dextrose 50% Injectable 12.5 Gram(s) IV Push once  dextrose 50% Injectable 25 Gram(s) IV Push once  dextrose 50% Injectable 25 Gram(s) IV Push once  enoxaparin Injectable 40 milliGRAM(s) SubCutaneous daily  folic acid 1 milliGRAM(s) Oral daily  glucagon  Injectable 1 milliGRAM(s) IntraMuscular once PRN  insulin glargine Injectable (LANTUS) 12 Unit(s) SubCutaneous at bedtime  insulin lispro (HumaLOG) corrective regimen sliding scale   SubCutaneous three times a day before meals  lactulose Syrup 20 Gram(s) Oral three times a day  midodrine 20 milliGRAM(s) Oral every 8 hours  multivitamin 1 Tablet(s) Oral daily  norepinephrine Infusion 0.05 MICROgram(s)/kG/Min IV Continuous <Continuous>  pantoprazole    Tablet 40 milliGRAM(s) Oral two times a day      Vital Signs Last 24 Hrs  T(C): 36.4 (2020 08:00), Max: 36.9 (2020 20:00)  T(F): 97.6 (2020 08:00), Max: 98.4 (2020 20:00)  HR: 82 (2020 11:00) (77 - 116)  BP: 120/55 (2020 11:00) (94/51 - 144/64)  BP(mean): 81 (2020 11:00) (73 - 91)  RR: 32 (2020 11:00) (8 - 32)  SpO2: 96% (2020 11:00) (95% - 100%)    EXAM:  Constitutional: Not in acute distress  Eyes: No icterus. Pupils b/l reactive to light. Generalized icterus noted  Oral cavity: Clear, no lesions  Neck: No neck vein distension noted  RS: Chest clear to auscultation bilaterally. No wheeze/rhonchi/crepitations.  CVS: S1, S2 heard. Regular rate and rhythm. ?Pansystolic murmur heard. No rubs/gallops.  Abdomen: Soft. No guarding/rigidity/tenderness.  : No acute abnormalities  Extremities: Warm. B/l pedal edema  Skin: No lesions noted  Vascular: No evidence of phlebitis  Neuro: Alert, oriented to time/place/person. No asterixis noted today                        7.5    19.04 )-----------( 70       ( 2020 04:58 )             22.5       02-12    132<L>  |  98  |  24<H>  ----------------------------<  218<H>  4.1   |  23  |  1.10    Ca    9.1      2020 04:58  Phos  2.7     02-12  Mg     1.6     02-12    TPro  5.1<L>  /  Alb  2.1<L>  /  TBili  8.6<H>  /  DBili  x   /  AST  21  /  ALT  11  /  AlkPhos  97  02-12      Urinalysis Basic - ( 2020 11:32 )    Color: Orange / Appearance: Turbid / S.018 / pH: x  Gluc: x / Ketone: Negative  / Bili: Small / Urobili: 3 mg/dL   Blood: x / Protein: 100 / Nitrite: Negative   Leuk Esterase: Large / RBC: 24 /hpf /  /HPF   Sq Epi: x / Non Sq Epi: 0 /hpf / Bacteria: Many        MICROBIOLOGY:Culture Results:   Growth in aerobic bottle:  Gram Negative Rods  Growth in anaerobic bottle:  Gram Negative Rods  ***Blood Panel PCR results on this specimen are available  approximately 3 hours after the Gram stain result.***  Gram stain, PCR, and/or culture results may not always  correspond due to difference in methodologies.  ************************************************************  This PCR assay was performed using [employer].  The following targets are tested for: Enterococcus,  vancomycin resistant enterococci, Listeria monocytogenes,  coagulase negative staphylococci, S. aureus,  methicillin resistant S. aureus, Streptococcus agalactiae  (Group B), S. pneumoniae, S. pyogenes (Group A),  Acinetobacter baumannii, Enterobacter cloacae, E. coli,  Klebsiella oxytoca, K. pneumoniae, Proteus sp.,  Serratia marcescens, Haemophilus influenzae,  Neisseria meningitidis, Pseudomonas aeruginosa, Candida  albicans, C. glabrata, C krusei, C parapsilosis,  C. tropicalis and the KPC resistance gene. ( @ 16:55)  Culture Results:   Growth in aerobic bottle: Gram Negative Rods ( @ 16:55) Follow Up: Pre-liver transplant eval, sepsis    Interval History/ROS:Patient is a 64y old  Male who presents with a chief complaint of fevers (2020 17:57)  - no acute events reported overnight  - pt weaned off pressors  - assessed at bedside this AM. Denied any acute complaints    Allergies    codeine (Anaphylaxis)    Intolerances        ANTIMICROBIALS:  meropenem  IVPB 1000 every 8 hours  rifAXIMin 550 two times a day      OTHER MEDS:  albumin human 25% IVPB 50 milliLiter(s) IV Intermittent every 6 hours  chlorhexidine 4% Liquid 1 Application(s) Topical <User Schedule>  dextrose 40% Gel 15 Gram(s) Oral once PRN  dextrose 5%. 1000 milliLiter(s) IV Continuous <Continuous>  dextrose 50% Injectable 12.5 Gram(s) IV Push once  dextrose 50% Injectable 25 Gram(s) IV Push once  dextrose 50% Injectable 25 Gram(s) IV Push once  enoxaparin Injectable 40 milliGRAM(s) SubCutaneous daily  folic acid 1 milliGRAM(s) Oral daily  glucagon  Injectable 1 milliGRAM(s) IntraMuscular once PRN  insulin glargine Injectable (LANTUS) 12 Unit(s) SubCutaneous at bedtime  insulin lispro (HumaLOG) corrective regimen sliding scale   SubCutaneous three times a day before meals  lactulose Syrup 20 Gram(s) Oral three times a day  midodrine 20 milliGRAM(s) Oral every 8 hours  multivitamin 1 Tablet(s) Oral daily  norepinephrine Infusion 0.05 MICROgram(s)/kG/Min IV Continuous <Continuous>  pantoprazole    Tablet 40 milliGRAM(s) Oral two times a day      Vital Signs Last 24 Hrs  T(C): 36.4 (2020 08:00), Max: 36.9 (2020 20:00)  T(F): 97.6 (2020 08:00), Max: 98.4 (2020 20:00)  HR: 82 (2020 11:00) (77 - 116)  BP: 120/55 (2020 11:00) (94/51 - 144/64)  BP(mean): 81 (2020 11:00) (73 - 91)  RR: 32 (2020 11:00) (8 - 32)  SpO2: 96% (2020 11:00) (95% - 100%)    EXAM:  Constitutional: Not in acute distress  Eyes: No icterus. Pupils b/l reactive to light. Generalized icterus noted  Oral cavity: Clear, no lesions  Neck: No neck vein distension noted  RS: Chest clear to auscultation bilaterally. No wheeze/rhonchi/crepitations.  CVS: S1, S2 heard. Regular rate and rhythm. ?Pansystolic murmur heard. No rubs/gallops.  Abdomen: Soft. No guarding/rigidity/tenderness.  : No acute abnormalities  Extremities: Warm. B/l pedal edema  Skin: No lesions noted  Vascular: No evidence of phlebitis  Neuro: Alert, oriented to time/place/person. No asterixis noted today                        7.5    19.04 )-----------( 70       ( 2020 04:58 )             22.5       02-12    132<L>  |  98  |  24<H>  ----------------------------<  218<H>  4.1   |  23  |  1.10    Ca    9.1      2020 04:58  Phos  2.7     02-12  Mg     1.6     02-12    TPro  5.1<L>  /  Alb  2.1<L>  /  TBili  8.6<H>  /  DBili  x   /  AST  21  /  ALT  11  /  AlkPhos  97  02-12      Urinalysis Basic - ( 2020 11:32 )    Color: Orange / Appearance: Turbid / S.018 / pH: x  Gluc: x / Ketone: Negative  / Bili: Small / Urobili: 3 mg/dL   Blood: x / Protein: 100 / Nitrite: Negative   Leuk Esterase: Large / RBC: 24 /hpf /  /HPF   Sq Epi: x / Non Sq Epi: 0 /hpf / Bacteria: Many        MICROBIOLOGY:Culture Results:   Growth in aerobic bottle:  Gram Negative Rods  Growth in anaerobic bottle:  Gram Negative Rods  ***Blood Panel PCR results on this specimen are available  approximately 3 hours after the Gram stain result.***  Gram stain, PCR, and/or culture results may not always  correspond due to difference in methodologies.  ************************************************************  This PCR assay was performed using [employer].  The following targets are tested for: Enterococcus,  vancomycin resistant enterococci, Listeria monocytogenes,  coagulase negative staphylococci, S. aureus,  methicillin resistant S. aureus, Streptococcus agalactiae  (Group B), S. pneumoniae, S. pyogenes (Group A),  Acinetobacter baumannii, Enterobacter cloacae, E. coli,  Klebsiella oxytoca, K. pneumoniae, Proteus sp.,  Serratia marcescens, Haemophilus influenzae,  Neisseria meningitidis, Pseudomonas aeruginosa, Candida  albicans, C. glabrata, C krusei, C parapsilosis,  C. tropicalis and the KPC resistance gene. ( @ 16:55)  Culture Results:   Growth in aerobic bottle: Gram Negative Rods ( @ 16:55)    RADIOLOGY:  <The imaging below has been reviewed and visualized by me independently. Findings as detailed in report below>    < from: CT Abdomen and Pelvis w/ IV Cont (20 @ 14:30) >  IMPRESSION:   Emphysematous cystitis.   Cirrhosis.  Nonobstructing left renal calculus.  < end of copied text >    < from: Xray Chest 1 View AP/PA (20 @ 10:48) >  Clear lungs  < end of copied text >

## 2020-02-12 NOTE — DIETITIAN INITIAL EVALUATION ADULT. - PHYSICAL APPEARANCE
Nutrition focused physical exam not indictaed at this time. No visual signs of muscle wasting or fat loss./other (specify)/overweight Ht: 76 inches Wt: 251.3 pounds BMI: 30.6 kg/m2 IBW: 202 pounds (+/-10%) %IBW: 124%  Edema: 1+ generalized Skin: no pressure injuries per documentation Ht: 73 inches (Pt stated weight) Wt: 251.3 pounds BMI: 33.1 kg/m2 IBW: 184 pounds (+/-10%) %IBW: 137%  Edema: 1+ generalized Skin: no pressure injuries per documentation

## 2020-02-12 NOTE — PROGRESS NOTE ADULT - ATTENDING COMMENTS
64/M with PMH IDDM, dyslipidemia, obesity, GERD, HFpEF with mild LV diastolic dysfunction, decompensated JEAN cirrhosis c/b ascites, h/o SBP, h/o HE, recent admission for ESBL E. 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 Patient Presents with Septic Shock    CT A/P with Emphysematous cystitis and U/A with pyuria  Blood Cultures with E coli  Favor Urinary Source    Overall, Septic Shock, E coli bacteremia, Emphysematous Cystitis, Fever, Leukocytosis, Pre-Liver Transplant Evaluation, Encounter to Vaccinate Patient    Given prior ESBL isolates agree with meropenem  I would obtain repeat blood cultures tomorrow  Anticipate switch to Ertapenem tomorrow if susceptibilities show ESBL   Patient not cleared for OLT at this time until etiology of infection determined and he stabilizes  Patient requires HBV and Pneumovax vaccination but defer until clinically stabilized    I will continue to follow. Please feel free to contact me with any further questions.    Eusebio Pérez M.D.  [facility] Division of Infectious Disease  8AM-5PM: Pager Number [phone]  After Hours (or if no response): Please contact the Infectious Diseases Office at [phone]     The above assessment and plan were discussed with MICU Team

## 2020-02-12 NOTE — PROGRESS NOTE ADULT - SUBJECTIVE AND OBJECTIVE BOX
CHIEF COMPLAINT:    Interval Events:    REVIEW OF SYSTEMS:  Constitutional: [ ] negative [ ] fevers [ ] chills [ ] weight loss [ ] weight gain  HEENT: [ ] negative [ ] dry eyes [ ] eye irritation [ ] postnasal drip [ ] nasal congestion  CV: [ ] negative  [ ] chest pain [ ] orthopnea [ ] palpitations [ ] murmur  Resp: [ ] negative [ ] cough [ ] shortness of breath [ ] dyspnea [ ] wheezing [ ] sputum [ ] hemoptysis  GI: [ ] negative [ ] nausea [ ] vomiting [ ] diarrhea [ ] constipation [ ] abd pain [ ] dysphagia   : [ ] negative [ ] dysuria [ ] nocturia [ ] hematuria [ ] increased urinary frequency  Musculoskeletal: [ ] negative [ ] back pain [ ] myalgias [ ] arthralgias [ ] fracture  Skin: [ ] negative [ ] rash [ ] itch  Neurological: [ ] negative [ ] headache [ ] dizziness [ ] syncope [ ] weakness [ ] numbness  Psychiatric: [ ] negative [ ] anxiety [ ] depression  Endocrine: [ ] negative [ ] diabetes [ ] thyroid problem  Hematologic/Lymphatic: [ ] negative [ ] anemia [ ] bleeding problem  Allergic/Immunologic: [ ] negative [ ] itchy eyes [ ] nasal discharge [ ] hives [ ] angioedema  [ ] All other systems negative  [ ] Unable to assess ROS because ________    OBJECTIVE:  ICU Vital Signs Last 24 Hrs  T(C): 36.8 (2020 04:00), Max: 39.6 (2020 10:33)  T(F): 98.2 (2020 04:00), Max: 103.3 (2020 10:33)  HR: 81 (2020 06:00) (81 - 116)  BP: 121/58 (2020 06:00) (80/47 - 144/64)  BP(mean): 83 (2020 06:00) (73 - 91)  ABP: --  ABP(mean): --  RR: 18 (2020 06:00) (8 - 23)  SpO2: 99% (2020 06:00) (95% - 100%)        -11 @ 07:01  -  02-12 @ 07:00  --------------------------------------------------------  IN: 1611.7 mL / OUT: 2050 mL / NET: -438.3 mL      CAPILLARY BLOOD GLUCOSE  163 (2020 17:00)      POCT Blood Glucose.: 163 mg/dL (2020 17:11)      PHYSICAL EXAM:  General:   HEENT:   Lymph Nodes:  Neck:   Respiratory:   Cardiovascular:   Abdomen:   Extremities:   Skin:   Neurological:  Psychiatry:    LINES:    HOSPITAL MEDICATIONS:  Standing Meds:  albumin human 25% IVPB 100 milliLiter(s) IV Intermittent every 6 hours  chlorhexidine 4% Liquid 1 Application(s) Topical <User Schedule>  dextrose 5%. 1000 milliLiter(s) IV Continuous <Continuous>  dextrose 50% Injectable 12.5 Gram(s) IV Push once  dextrose 50% Injectable 25 Gram(s) IV Push once  dextrose 50% Injectable 25 Gram(s) IV Push once  enoxaparin Injectable 40 milliGRAM(s) SubCutaneous daily  folic acid 1 milliGRAM(s) Oral daily  insulin glargine Injectable (LANTUS) 12 Unit(s) SubCutaneous at bedtime  insulin lispro (HumaLOG) corrective regimen sliding scale   SubCutaneous three times a day before meals  lactulose Syrup 20 Gram(s) Oral three times a day  meropenem  IVPB 1000 milliGRAM(s) IV Intermittent every 8 hours  midodrine 20 milliGRAM(s) Oral every 8 hours  multivitamin 1 Tablet(s) Oral daily  norepinephrine Infusion 0.05 MICROgram(s)/kG/Min IV Continuous <Continuous>  pantoprazole    Tablet 40 milliGRAM(s) Oral two times a day  rifAXIMin 550 milliGRAM(s) Oral two times a day      PRN Meds:  dextrose 40% Gel 15 Gram(s) Oral once PRN  glucagon  Injectable 1 milliGRAM(s) IntraMuscular once PRN      LABS:                        7.5    19.04 )-----------( 70       ( 2020 04:58 )             22.5     Hgb Trend: 7.5<--, 9.8<--  02-12    132<L>  |  98  |  24<H>  ----------------------------<  218<H>  4.1   |  23  |  1.10    Ca    9.1      2020 04:58  Phos  2.7     02-12  Mg     1.6     02-12    TPro  5.1<L>  /  Alb  2.1<L>  /  TBili  8.6<H>  /  DBili  x   /  AST  21  /  ALT  11  /  AlkPhos  97  02-12    Creatinine Trend: 1.10<--, 1.24<--  PT/INR - ( 2020 04:58 )   PT: 29.5 sec;   INR: 2.51 ratio         PTT - ( 2020 11:53 )  PTT:35.4 sec  Urinalysis Basic - ( 2020 11:32 )    Color: Orange / Appearance: Turbid / S.018 / pH: x  Gluc: x / Ketone: Negative  / Bili: Small / Urobili: 3 mg/dL   Blood: x / Protein: 100 / Nitrite: Negative   Leuk Esterase: Large / RBC: 24 /hpf /  /HPF   Sq Epi: x / Non Sq Epi: 0 /hpf / Bacteria: Many        Venous Blood Gas:   @ 12:40  7.40/38/44/23/74  VBG Lactate: 5.8  Venous Blood Gas:   @ 11:10  --/--/--/--/--  VBG Lactate: 5.3      MICROBIOLOGY:     Culture - Blood (collected 2020 16:55)  Source: .Blood Blood-Peripheral  Gram Stain (2020 05:49):    Growth in aerobic and anaerobic bottles:    Gram Negative Rods  Preliminary Report (2020 05:50):    Growth in aerobic bottle:    Gram Negative Rods    Growth in anaerobic bottle:    Gram Negative Rods    ***Blood Panel PCR results on this specimen are available    approximately 3 hours after the Gram stain result.***    Gram stain, PCR, and/or culture results may not always    correspond due to difference in methodologies.    ************************************************************    This PCR assay was performed using [employer].    The following targets are tested for: Enterococcus,    vancomycin resistant enterococci, Listeria monocytogenes,    coagulase negative staphylococci, S. aureus,    methicillin resistant S. aureus, Streptococcus agalactiae    (Group B), S. pneumoniae, S. pyogenes (Group A),    Acinetobacter baumannii, Enterobacter cloacae, E. coli,    Klebsiella oxytoca, K. pneumoniae, Proteus sp.,    Serratia marcescens, Haemophilus influenzae,    Neisseria meningitidis, Pseudomonas aeruginosa, Candida    albicans, C. glabrata, C krusei, C parapsilosis,    C. tropicalis and the KPC resistance gene.      RADIOLOGY:  [ ] Reviewed and interpreted by me    EKG: CHIEF COMPLAINT:    Interval Events: no acute events overnight. pt has no complaints at this moment and feels much better. pt had a nonbloody, non melanotic BM today. no diarrhea. denies fever, nausea, vomiting, or abd pain. pt has been off pressors since last night and MAPs have been adequate. BCx grew Ecoli.     REVIEW OF SYSTEMS:  Constitutional: [x] negative [ ] fevers [ ] chills [ ] weight loss [ ] weight gain  HEENT: [X] negative [ ] dry eyes [ ] eye irritation [ ] postnasal drip [ ] nasal congestion  CV: [X] negative  [ ] chest pain [ ] orthopnea [ ] palpitations [ ] murmur  Resp: [x] negative [ ] cough [ ] shortness of breath [ ] dyspnea [ ] wheezing [ ] sputum [ ] hemoptysis  GI: [x] negative [ ] nausea [ ] vomiting [ ] diarrhea [ ] constipation [ ] abd pain [ ] dysphagia   : [x] negative [ ] dysuria [ ] nocturia [ ] hematuria [ ] increased urinary frequency  Musculoskeletal: [x] negative [ ] back pain [ ] myalgias [ ] arthralgias [ ] fracture  Skin: [x] negative [ ] rash [ ] itch  Neurological: [x] negative [ ] headache [ ] dizziness [ ] syncope [ ] weakness [ ] numbness  [x] All other systems negative    OBJECTIVE:  ICU Vital Signs Last 24 Hrs  T(C): 36.8 (2020 04:00), Max: 39.6 (2020 10:33)  T(F): 98.2 (2020 04:00), Max: 103.3 (2020 10:33)  HR: 81 (2020 06:00) (81 - 116)  BP: 121/58 (2020 06:00) (80/47 - 144/64)  BP(mean): 83 (2020 06:00) (73 - 91)  ABP: --  ABP(mean): --  RR: 18 (2020 06:00) (8 - 23)  SpO2: 99% (2020 06:00) (95% - 100%)        -11 @ 07:01  -  02-12 @ 07:00  --------------------------------------------------------  IN: 1611.7 mL / OUT: 2050 mL / NET: -438.3 mL      CAPILLARY BLOOD GLUCOSE  163 (2020 17:00)      POCT Blood Glucose.: 163 mg/dL (2020 17:11)      PHYSICAL EXAM:  General: NAD; mildly jaundiced;  HEAD: NC/AT  EYES: PERRLA, EOMI, mild scleral icterus  Neck: supple, no JVD  Respiratory: CTA b/l;   Cardiovascular: +S1/S2; RRR; no m/r/g  Abdomen: +BS; obese, soft, nt/nd; no rebound or guarding  Extremities: WWP; no cyanosis or clubbing; no edema  Skin: jaundice  Neurological: A&Ox3; no gross focal neuro deficits;    LINES:    HOSPITAL MEDICATIONS:  Standing Meds:  albumin human 25% IVPB 100 milliLiter(s) IV Intermittent every 6 hours  chlorhexidine 4% Liquid 1 Application(s) Topical <User Schedule>  dextrose 5%. 1000 milliLiter(s) IV Continuous <Continuous>  dextrose 50% Injectable 12.5 Gram(s) IV Push once  dextrose 50% Injectable 25 Gram(s) IV Push once  dextrose 50% Injectable 25 Gram(s) IV Push once  enoxaparin Injectable 40 milliGRAM(s) SubCutaneous daily  folic acid 1 milliGRAM(s) Oral daily  insulin glargine Injectable (LANTUS) 12 Unit(s) SubCutaneous at bedtime  insulin lispro (HumaLOG) corrective regimen sliding scale   SubCutaneous three times a day before meals  lactulose Syrup 20 Gram(s) Oral three times a day  meropenem  IVPB 1000 milliGRAM(s) IV Intermittent every 8 hours  midodrine 20 milliGRAM(s) Oral every 8 hours  multivitamin 1 Tablet(s) Oral daily  norepinephrine Infusion 0.05 MICROgram(s)/kG/Min IV Continuous <Continuous>  pantoprazole    Tablet 40 milliGRAM(s) Oral two times a day  rifAXIMin 550 milliGRAM(s) Oral two times a day      PRN Meds:  dextrose 40% Gel 15 Gram(s) Oral once PRN  glucagon  Injectable 1 milliGRAM(s) IntraMuscular once PRN      LABS:                        7.5    19.04 )-----------( 70       ( 2020 04:58 )             22.5     Hgb Trend: 7.5<--, 9.8<--  02-12    132<L>  |  98  |  24<H>  ----------------------------<  218<H>  4.1   |  23  |  1.10    Ca    9.1      2020 04:58  Phos  2.7     0212  Mg     1.6     0212    TPro  5.1<L>  /  Alb  2.1<L>  /  TBili  8.6<H>  /  DBili  x   /  AST  21  /  ALT  11  /  AlkPhos  97  0212    Creatinine Trend: 1.10<--, 1.24<--  PT/INR - ( 2020 04:58 )   PT: 29.5 sec;   INR: 2.51 ratio         PTT - ( 2020 11:53 )  PTT:35.4 sec  Urinalysis Basic - ( 2020 11:32 )    Color: Orange / Appearance: Turbid / S.018 / pH: x  Gluc: x / Ketone: Negative  / Bili: Small / Urobili: 3 mg/dL   Blood: x / Protein: 100 / Nitrite: Negative   Leuk Esterase: Large / RBC: 24 /hpf /  /HPF   Sq Epi: x / Non Sq Epi: 0 /hpf / Bacteria: Many        Venous Blood Gas:   @ 12:40  7.40/38/44/23/74  VBG Lactate: 5.8  Venous Blood Gas:   @ 11:10  --/--/--/--/--  VBG Lactate: 5.3      MICROBIOLOGY:     Culture - Blood (collected 2020 16:55)  Source: .Blood Blood-Peripheral  Gram Stain (2020 05:49):    Growth in aerobic and anaerobic bottles:    Gram Negative Rods  Preliminary Report (2020 05:50):    Growth in aerobic bottle:    Gram Negative Rods    Growth in anaerobic bottle:    Gram Negative Rods    ***Blood Panel PCR results on this specimen are available    approximately 3 hours after the Gram stain result.***    Gram stain, PCR, and/or culture results may not always    correspond due to difference in methodologies.    ************************************************************    This PCR assay was performed using [employer].    The following targets are tested for: Enterococcus,    vancomycin resistant enterococci, Listeria monocytogenes,    coagulase negative staphylococci, S. aureus,    methicillin resistant S. aureus, Streptococcus agalactiae    (Group B), S. pneumoniae, S. pyogenes (Group A),    Acinetobacter baumannii, Enterobacter cloacae, E. coli,    Klebsiella oxytoca, K. pneumoniae, Proteus sp.,    Serratia marcescens, Haemophilus influenzae,    Neisseria meningitidis, Pseudomonas aeruginosa, Candida    albicans, C. glabrata, C krusei, C parapsilosis,    C. tropicalis and the KPC resistance gene.      RADIOLOGY:  [ ] Reviewed and interpreted by me    EKG:

## 2020-02-12 NOTE — CONSULT NOTE ADULT - ASSESSMENT
65yo M with IDDM, dyslipidemia, obesity, GERD, HFpEF with mild LV diastolic dysfunction, and decompensated JEAN cirrhosis complicated by ascites, history of SBP, hepatic encephalopathy, and chronic anemia with a history of duodenal ulcer as well as GAVE and duodenal AVM s/p APC (last on 10/11/19), who is UNOS listed for liver transplantation at [facility] who presents with confusion, weakness, and fall at home, found to have sepsis and ESBL bacteremia    Bacteremia -- on abx  -- f/u ID    Anemia -- had extensive w/u in the past. [place] to be related to ACD. Also with MGUS  -- hgb adequate  -- transfuse for hgb <7  -- monitor CBC    MGUS -- outpt f/u    thrombocytopenia -- chronic and stable, likely due to cirrhosis  -- can be exacerbated but infection and abx  -- monitor CBC  -- transfuse for plts <10 if no bleeding, <50 if with bleeding    D/w pt, will follow, [phone]

## 2020-02-12 NOTE — PROGRESS NOTE ADULT - SUBJECTIVE AND OBJECTIVE BOX
CC: bacteremia  HPI:  Pt is a 64M with IDDM, dyslipidemia, obesity, GERD, HFpEF with mild LV diastolic dysfunction, and decompensated JEAN cirrhosis complicated by ascites, history of SBP, hepatic encephalopathy, and chronic anemia with a history of duodenal ulcer as well as GAVE and duodenal AVM s/p APC (last on 10/11/19), who is UNOS listed for liver transplantation at [facility] who presents with confusion, weakness, and fall at home. Patient on Monday evening did not feel himself. Felt lethargic, unable to sleep. Had new urinary frequency with small urine output with each urination.  He laid down in living room and then when walked to bedroom his legs gave out and he fell. Went into bed and in the morning admits to more confusiona nd another fall. Hit his med alert button and EMS came and brought him to ED. In ED was hypotensive, started on levophed and admitted to MICU. Found to have E.coli bacteremia, started on meropenem.  BP improved, taken off levophed and transferred to floors. Currently without symptoms. No fever, chills, confusion, chest pain SOB, abdominal pain, n/v/d/c/dysuria. No flank pain. No diagnostic para as no pocket.       PAST MEDICAL & SURGICAL HISTORY:  GIB (gastrointestinal bleeding)  GERD with esophagitis: Gastritis &amp; Non Bleeding Ulcers  Hepatic encephalopathy  Obesity  Fatty liver disease, nonalcoholic  Renal stones: 25 years ago  Hypertension  Neuropathy  Hypercholesteremia  Diabetes  S/P cholecystectomy      Review of Systems:   ROS negative except for as above  Allergies    codeine (Anaphylaxis)    Intolerances        Social History:     FAMILY HISTORY:  Family history of type 2 diabetes mellitus  Family history of hypertension  Family history of stomach cancer      MEDICATIONS  (STANDING):  albumin human 25% IVPB 50 milliLiter(s) IV Intermittent every 6 hours  dextrose 5%. 1000 milliLiter(s) (50 mL/Hr) IV Continuous <Continuous>  dextrose 50% Injectable 12.5 Gram(s) IV Push once  dextrose 50% Injectable 25 Gram(s) IV Push once  dextrose 50% Injectable 25 Gram(s) IV Push once  enoxaparin Injectable 40 milliGRAM(s) SubCutaneous daily  folic acid 1 milliGRAM(s) Oral daily  furosemide    Tablet 40 milliGRAM(s) Oral <User Schedule>  insulin glargine Injectable (LANTUS) 12 Unit(s) SubCutaneous at bedtime  insulin lispro (HumaLOG) corrective regimen sliding scale   SubCutaneous three times a day before meals  lactulose Syrup 20 Gram(s) Oral three times a day  meropenem  IVPB 1000 milliGRAM(s) IV Intermittent every 8 hours  midodrine 20 milliGRAM(s) Oral every 8 hours  multivitamin 1 Tablet(s) Oral daily  norepinephrine Infusion 0.05 MICROgram(s)/kG/Min (10.631 mL/Hr) IV Continuous <Continuous>  pantoprazole    Tablet 40 milliGRAM(s) Oral two times a day  rifAXIMin 550 milliGRAM(s) Oral two times a day  spironolactone 25 milliGRAM(s) Oral daily    MEDICATIONS  (PRN):  dextrose 40% Gel 15 Gram(s) Oral once PRN Blood Glucose LESS THAN 70 milliGRAM(s)/deciliter  glucagon  Injectable 1 milliGRAM(s) IntraMuscular once PRN Glucose LESS THAN 70 milligrams/deciliter      Vital Signs Last 24 Hrs  T(C): 36.7 (2020 13:55), Max: 36.9 (2020 20:00)  T(F): 98.1 (2020 13:55), Max: 98.4 (2020 20:00)  HR: 83 (2020 13:55) (77 - 113)  BP: 107/64 (2020 13:55) (100/55 - 144/64)  BP(mean): 74 (2020 13:00) (73 - 91)  RR: 18 (2020 13:55) (8 - 32)  SpO2: 98% (2020 13:55) (95% - 100%)    POCT Blood Glucose.: 176 mg/dL (2020 12:24)  POCT Blood Glucose.: 149 mg/dL (2020 08:39)  POCT Blood Glucose.: 163 mg/dL (2020 17:11)    I&O's Summary    2020 07:01  -  2020 07:00  --------------------------------------------------------  IN: 1611.7 mL / OUT: 2050 mL / NET: -438.3 mL    2020 07:01  -  2020 14:30  --------------------------------------------------------  IN: 490 mL / OUT: 625 mL / NET: -135 mL        PHYSICAL EXAM:  GENERAL: NAD, well-developed  EYES: EOMI, PERRLA,icteric sclera.  NECK: Supple, No JVD  CHEST/LUNG: Clear to auscultation bilaterally; No wheeze  HEART: Regular rate and rhythm; No murmurs, rubs, or gallops  ABDOMEN: Soft, Nontender, Nondistended; Bowel sounds present. No fluid wave appreciated.   EXTREMITIES:  trace edema b/l. Chronic venous changes. No clubbing note.d   PSYCH: AAOx3  NEUROLOGY: non-focal. No asterixis on my exam.   SKIN: icteric skin    LABS:                        7.5    19.04 )-----------( 70       ( 2020 04:58 )             22.5     02-12    132<L>  |  98  |  24<H>  ----------------------------<  218<H>  4.1   |  23  |  1.10    Ca    9.1      2020 04:58  Phos  2.7     02-12  Mg     1.6     02-12    TPro  5.1<L>  /  Alb  2.1<L>  /  TBili  8.6<H>  /  DBili  x   /  AST  21  /  ALT  11  /  AlkPhos  97  02-12    PT/INR - ( 2020 04:58 )   PT: 29.5 sec;   INR: 2.51 ratio         PTT - ( 2020 11:53 )  PTT:35.4 sec      Urinalysis Basic - ( 2020 11:32 )    Color: Orange / Appearance: Turbid / S.018 / pH: x  Gluc: x / Ketone: Negative  / Bili: Small / Urobili: 3 mg/dL   Blood: x / Protein: 100 / Nitrite: Negative   Leuk Esterase: Large / RBC: 24 /hpf /  /HPF   Sq Epi: x / Non Sq Epi: 0 /hpf / Bacteria: Many        RADIOLOGY & ADDITIONAL TESTS:    Imaging Personally Reviewed:  < from: CT Abdomen and Pelvis w/ IV Cont (20 @ 14:30) >    IMPRESSION:     Emphysematous cystitis.   Cirrhosis.  Nonobstructing left renal calculus.              < end of copied text >      Consultant(s) Notes Reviewed:  ID, MICU, Hepatology    Care Discussed with Consultants/Other Providers:

## 2020-02-12 NOTE — DIETITIAN INITIAL EVALUATION ADULT. - DIET TYPE
Consistent Carbohydrate with snack; Fluid Restriction 1500ml Recommend Low Sodium, Consistent Carbohydrate diet. Monitor and adjust as needed. Spoke to provider.

## 2020-02-12 NOTE — DIETITIAN INITIAL EVALUATION ADULT. - REASON INDICATOR FOR ASSESSMENT
Pt seen for MICU Length Of Stay initial nutrition evaluation.  Information obtained from medical record, previous RD notes, and patient.

## 2020-02-12 NOTE — PROGRESS NOTE ADULT - PROBLEM SELECTOR PLAN 1
patient with e.coli bacteremia, prior cultures ESBL.  -continue with meropenem  -ordered for repeat cultures  -maintain MAP>65  -continue with midodrine which is dual purpose for septic shock and blood pressure management in cirrhotic at this itme.  -follow up sensitivities

## 2020-02-12 NOTE — PROGRESS NOTE ADULT - ATTENDING COMMENTS
#anemia  -drop of h/h from yesterday to today.   -this is more of his baseline, no evidence of bleeding, continue to monitor.

## 2020-02-12 NOTE — CONSULT NOTE ADULT - SUBJECTIVE AND OBJECTIVE BOX
Pt known to us from prevoius admission. 65yo M with IDDM, dyslipidemia, obesity, GERD, HFpEF with mild LV diastolic dysfunction, and decompensated JEAN cirrhosis complicated by ascites, history of SBP, hepatic encephalopathy, and chronic anemia with a history of duodenal ulcer as well as GAVE and duodenal AVM s/p APC (last on 10/11/19), who is UNOS listed for liver transplantation at [facility] who presents with confusion, weakness, and fall at home. Pt has felt worsening weakness and lethargy for two days. In ED, initial VS were T103.3  /59 RR17 SaO2 98% on RA. Labs notable for WBC of 22.59, ammonia of 67, lactate on VBG of 5.8. CXR was clear, RVP negative. UA grossly positive. Pt became hypotensive to 80s/50s and was started on levophed and admitted to MICU for further management, where he stabilized and improved. Currently transferred to the floor.     He feels well, has no complaints. He was found to have Ecoli bacteremia.       PAST MEDICAL & SURGICAL HISTORY:  GIB (gastrointestinal bleeding)  GERD with esophagitis: Gastritis &amp; Non Bleeding Ulcers  Hepatic encephalopathy  Obesity  Fatty liver disease, nonalcoholic  Renal stones: 25 years ago  Hypertension  Neuropathy  Hypercholesteremia  Diabetes  S/P cholecystectomy      FAMILY HISTORY:  Family history of type 2 diabetes mellitus  Family history of hypertension  Family history of stomach cancer      Alochol: Denied  Smoking: Nonsmoker  Drug Use: Denied  Marital Status:         Allergies    codeine (Anaphylaxis)    Intolerances        MEDICATIONS  (STANDING):  albumin human 25% IVPB 50 milliLiter(s) IV Intermittent every 6 hours  dextrose 5%. 1000 milliLiter(s) (50 mL/Hr) IV Continuous <Continuous>  dextrose 50% Injectable 12.5 Gram(s) IV Push once  dextrose 50% Injectable 25 Gram(s) IV Push once  dextrose 50% Injectable 25 Gram(s) IV Push once  enoxaparin Injectable 40 milliGRAM(s) SubCutaneous daily  folic acid 1 milliGRAM(s) Oral daily  furosemide    Tablet 40 milliGRAM(s) Oral <User Schedule>  insulin glargine Injectable (LANTUS) 12 Unit(s) SubCutaneous at bedtime  insulin lispro (HumaLOG) corrective regimen sliding scale   SubCutaneous three times a day before meals  lactulose Syrup 20 Gram(s) Oral three times a day  meropenem  IVPB 1000 milliGRAM(s) IV Intermittent every 8 hours  midodrine 20 milliGRAM(s) Oral every 8 hours  multivitamin 1 Tablet(s) Oral daily  norepinephrine Infusion 0.05 MICROgram(s)/kG/Min (10.631 mL/Hr) IV Continuous <Continuous>  pantoprazole    Tablet 40 milliGRAM(s) Oral two times a day  rifAXIMin 550 milliGRAM(s) Oral two times a day  spironolactone 25 milliGRAM(s) Oral daily    MEDICATIONS  (PRN):  dextrose 40% Gel 15 Gram(s) Oral once PRN Blood Glucose LESS THAN 70 milliGRAM(s)/deciliter  glucagon  Injectable 1 milliGRAM(s) IntraMuscular once PRN Glucose LESS THAN 70 milligrams/deciliter      ROS  No fever, sweats, chills  No epistaxis, HA, sore throat  No CP, SOB, cough, sputum  No n/v/d, abd pain, melena, hematochezia  No edema  No rash  No anxiety  No back pain, joint pain  No bleeding, bruising  No dysuria, hematuria    T(C): 36.7 (02-12-20 @ 13:55), Max: 36.9 (02-11-20 @ 20:00)  HR: 83 (02-12-20 @ 13:55) (77 - 107)  BP: 107/64 (02-12-20 @ 13:55) (100/55 - 144/64)  RR: 18 (02-12-20 @ 13:55) (8 - 32)  SpO2: 98% (02-12-20 @ 13:55) (95% - 100%)  Wt(kg): --    PE  NAD  Awake, alert  Anicteric, MMM  RRR  CTAB  Abd soft, NT, ND  No edema  No rash grossly  FROM                          7.5    19.04 )-----------( 70       ( 12 Feb 2020 04:58 )             22.5       02-12    132<L>  |  98  |  24<H>  ----------------------------<  218<H>  4.1   |  23  |  1.10    Ca    9.1      12 Feb 2020 04:58  Phos  2.7     02-12  Mg     1.6     02-12    TPro  5.1<L>  /  Alb  2.1<L>  /  TBili  8.6<H>  /  DBili  x   /  AST  21  /  ALT  11  /  AlkPhos  97  02-12

## 2020-02-12 NOTE — PROGRESS NOTE ADULT - SUBJECTIVE AND OBJECTIVE BOX
Chief Complaint:  Patient is a 64y old  Male who presents with a chief complaint of fevers (2020 17:57)      Interval Events: Pt is off Levophed. Blood cultures grew Ecoli. Urine culture pending.   ROS: All 12 point system except listed above were otherwise negative.    Allergies:  codeine (Anaphylaxis)        Hospital Medications:  albumin human 25% IVPB 100 milliLiter(s) IV Intermittent every 6 hours  chlorhexidine 4% Liquid 1 Application(s) Topical <User Schedule>  dextrose 40% Gel 15 Gram(s) Oral once PRN  dextrose 5%. 1000 milliLiter(s) IV Continuous <Continuous>  dextrose 50% Injectable 12.5 Gram(s) IV Push once  dextrose 50% Injectable 25 Gram(s) IV Push once  dextrose 50% Injectable 25 Gram(s) IV Push once  enoxaparin Injectable 40 milliGRAM(s) SubCutaneous daily  folic acid 1 milliGRAM(s) Oral daily  glucagon  Injectable 1 milliGRAM(s) IntraMuscular once PRN  insulin glargine Injectable (LANTUS) 12 Unit(s) SubCutaneous at bedtime  insulin lispro (HumaLOG) corrective regimen sliding scale   SubCutaneous three times a day before meals  lactulose Syrup 20 Gram(s) Oral three times a day  meropenem  IVPB 1000 milliGRAM(s) IV Intermittent every 8 hours  midodrine 20 milliGRAM(s) Oral every 8 hours  multivitamin 1 Tablet(s) Oral daily  norepinephrine Infusion 0.05 MICROgram(s)/kG/Min IV Continuous <Continuous>  pantoprazole    Tablet 40 milliGRAM(s) Oral two times a day  rifAXIMin 550 milliGRAM(s) Oral two times a day      PMHX/PSHX:  GIB (gastrointestinal bleeding)  GERD with esophagitis  Hepatic encephalopathy  Obesity  Fatty liver disease, nonalcoholic  Renal stones  Hypertension  Neuropathy  Hypercholesteremia  Diabetes  S/P cholecystectomy  No significant past surgical history      Family history:  Family history of type 2 diabetes mellitus  Family history of hypertension  Family history of stomach cancer  No pertinent family history in first degree relatives    There is no family history of peptic ulcer disease, gastric cancer, colon polyps, colon cancer, celiac disease, biliary, hepatic, or pancreatic disease.  None of the female relatives have breast, uterine, or ovarian cancer.     ROS:     General:  No wt loss, fevers, chills, night sweats, fatigue,   Eyes:  Good vision, no reported pain  ENT:  No sore throat, pain, runny nose, dysphagia  CV:  No pain, palpitations, hypo/hypertension  Resp:  No dyspnea, cough, tachypnea, wheezing  GI:  See HPI  :  No pain, bleeding, incontinence, nocturia  Muscle:  No pain, weakness  Neuro:  No weakness, tingling, memory problems  Psych:  No fatigue, insomnia, mood problems, depression  Endocrine:  No polyuria, polydipsia, cold/heat intolerance  Heme:  No petechiae, ecchymosis, easy bruisability  Skin:  No rash, edema    PHYSICAL EXAM:   Vital Signs:  Vital Signs Last 24 Hrs  T(C): 36.4 (2020 08:00), Max: 39.6 (2020 10:33)  T(F): 97.6 (2020 08:00), Max: 103.3 (2020 10:33)  HR: 77 (2020 08:00) (77 - 116)  BP: 121/58 (2020 08:00) (80/47 - 144/64)  BP(mean): 84 (2020 08:00) (73 - 91)  RR: 14 (2020 08:00) (8 - 23)  SpO2: 98% (2020 08:00) (95% - 100%)  Daily Height in cm: 193.04 (2020 16:15)    Daily Weight in k (2020 16:15)      PHYSICAL EXAM:     GENERAL:  Appears stated age, well-groomed, well-nourished, no distress  HEENT:  NC/AT,  conjunctivae clear and pink,  no JVD, + Icterus  CHEST:  Full & symmetric excursion, no increased effort, breath sounds clear  HEART:  Regular rhythm, S1, S2, no murmur/rub/S3/S4, no abdominal bruit, no edema  ABDOMEN:  Soft, non-tender, +mildly distended, normoactive bowel sounds,  no masses  EXTREMITIES:  no cyanosis,clubbing or edema  SKIN:  No rash/erythema/ecchymoses/petechiae/wounds/abscess/warm/dry  NEURO:  Alert, oriented x4, +mild asterixis, +mild psychomotor slowing    LABS:                        7.5    19.04 )-----------( 70       ( 2020 04:58 )             22.5     Mean Cell Volume: 106.1 fl (-20 @ 04:58)        132<L>  |  98  |  24<H>  ----------------------------<  218<H>  4.1   |  23  |  1.10    Ca    9.1      2020 04:58  Phos  2.7       Mg     1.6         TPro  5.1<L>  /  Alb  2.1<L>  /  TBili  8.6<H>  /  DBili  x   /  AST  21  /  ALT  11  /  AlkPhos  97  12    LIVER FUNCTIONS - ( 2020 04:58 )  Alb: 2.1 g/dL / Pro: 5.1 g/dL / ALK PHOS: 97 U/L / ALT: 11 U/L / AST: 21 U/L / GGT: x           PT/INR - ( 2020 04:58 )   PT: 29.5 sec;   INR: 2.51 ratio         PTT - ( 2020 11:53 )  PTT:35.4 sec  Urinalysis Basic - ( 2020 11:32 )    Color: Orange / Appearance: Turbid / S.018 / pH: x  Gluc: x / Ketone: Negative  / Bili: Small / Urobili: 3 mg/dL   Blood: x / Protein: 100 / Nitrite: Negative   Leuk Esterase: Large / RBC: 24 /hpf /  /HPF   Sq Epi: x / Non Sq Epi: 0 /hpf / Bacteria: Many      Amylase Serum--      Lipase serum--       [id number]  Amylase Serum--      Lipase serum--       [id number]                          7.5    19.04 )-----------( 70       ( 2020 04:58 )             22.5                         9.8    22.59 )-----------( 107      ( 2020 11:10 )             29.6     Imaging: Chief Complaint:  Patient is a 64y old  Male who presents with a chief complaint of fevers (2020 17:57)      Interval Events: Pt is off Levophed. Blood cultures grew Ecoli. Urine culture pending.   ROS: All 12 point system except listed above were otherwise negative.    Allergies:  codeine (Anaphylaxis)        Hospital Medications:  albumin human 25% IVPB 100 milliLiter(s) IV Intermittent every 6 hours  chlorhexidine 4% Liquid 1 Application(s) Topical <User Schedule>  dextrose 40% Gel 15 Gram(s) Oral once PRN  dextrose 5%. 1000 milliLiter(s) IV Continuous <Continuous>  dextrose 50% Injectable 12.5 Gram(s) IV Push once  dextrose 50% Injectable 25 Gram(s) IV Push once  dextrose 50% Injectable 25 Gram(s) IV Push once  enoxaparin Injectable 40 milliGRAM(s) SubCutaneous daily  folic acid 1 milliGRAM(s) Oral daily  glucagon  Injectable 1 milliGRAM(s) IntraMuscular once PRN  insulin glargine Injectable (LANTUS) 12 Unit(s) SubCutaneous at bedtime  insulin lispro (HumaLOG) corrective regimen sliding scale   SubCutaneous three times a day before meals  lactulose Syrup 20 Gram(s) Oral three times a day  meropenem  IVPB 1000 milliGRAM(s) IV Intermittent every 8 hours  midodrine 20 milliGRAM(s) Oral every 8 hours  multivitamin 1 Tablet(s) Oral daily  norepinephrine Infusion 0.05 MICROgram(s)/kG/Min IV Continuous <Continuous>  pantoprazole    Tablet 40 milliGRAM(s) Oral two times a day  rifAXIMin 550 milliGRAM(s) Oral two times a day      PMHX/PSHX:  GIB (gastrointestinal bleeding)  GERD with esophagitis  Hepatic encephalopathy  Obesity  Fatty liver disease, nonalcoholic  Renal stones  Hypertension  Neuropathy  Hypercholesteremia  Diabetes  S/P cholecystectomy  No significant past surgical history      Family history:  Family history of type 2 diabetes mellitus  Family history of hypertension  Family history of stomach cancer  No pertinent family history in first degree relatives    There is no family history of peptic ulcer disease, gastric cancer, colon polyps, colon cancer, celiac disease, biliary, hepatic, or pancreatic disease.  None of the female relatives have breast, uterine, or ovarian cancer.     ROS:     General:  No wt loss, fevers, chills, night sweats, fatigue,   Eyes:  Good vision, no reported pain  ENT:  No sore throat, pain, runny nose, dysphagia  CV:  No pain, palpitations, hypo/hypertension  Resp:  No dyspnea, cough, tachypnea, wheezing  GI:  See HPI  :  No pain, bleeding, incontinence, nocturia  Muscle:  No pain, weakness  Neuro:  No weakness, tingling, memory problems  Psych:  No fatigue, insomnia, mood problems, depression  Endocrine:  No polyuria, polydipsia, cold/heat intolerance  Heme:  No petechiae, ecchymosis, easy bruisability  Skin:  No rash, edema    PHYSICAL EXAM:   Vital Signs:  Vital Signs Last 24 Hrs  T(C): 36.4 (2020 08:00), Max: 39.6 (2020 10:33)  T(F): 97.6 (2020 08:00), Max: 103.3 (2020 10:33)  HR: 77 (2020 08:00) (77 - 116)  BP: 121/58 (2020 08:00) (80/47 - 144/64)  BP(mean): 84 (2020 08:00) (73 - 91)  RR: 14 (2020 08:00) (8 - 23)  SpO2: 98% (2020 08:00) (95% - 100%)  Daily Height in cm: 193.04 (2020 16:15)    Daily Weight in k (2020 16:15)      PHYSICAL EXAM:     GENERAL:  Appears stated age, well-groomed, well-nourished, no distress  HEENT:  NC/AT,  conjunctivae clear and pink,  no JVD, + Icterus  CHEST:  Full & symmetric excursion, no increased effort, breath sounds clear  HEART:  Regular rhythm, S1, S2, no murmur/rub/S3/S4, no abdominal bruit, no edema  ABDOMEN:  Soft, non-tender, +mildly distended, normoactive bowel sounds,  no masses  EXTREMITIES:  no cyanosis,clubbing or edema  SKIN:  No rash/erythema/ecchymoses/petechiae/wounds/abscess/warm/dry  NEURO:  Alert, oriented x4, no asterixis    LABS:                        7.5    19.04 )-----------( 70       ( 2020 04:58 )             22.5     Mean Cell Volume: 106.1 fl (20 @ 04:58)        132<L>  |  98  |  24<H>  ----------------------------<  218<H>  4.1   |  23  |  1.10    Ca    9.1      2020 04:58  Phos  2.7     12  Mg     1.6         TPro  5.1<L>  /  Alb  2.1<L>  /  TBili  8.6<H>  /  DBili  x   /  AST  21  /  ALT  11  /  AlkPhos  97  02-12    LIVER FUNCTIONS - ( 2020 04:58 )  Alb: 2.1 g/dL / Pro: 5.1 g/dL / ALK PHOS: 97 U/L / ALT: 11 U/L / AST: 21 U/L / GGT: x           PT/INR - ( 2020 04:58 )   PT: 29.5 sec;   INR: 2.51 ratio         PTT - ( 2020 11:53 )  PTT:35.4 sec  Urinalysis Basic - ( 2020 11:32 )    Color: Orange / Appearance: Turbid / S.018 / pH: x  Gluc: x / Ketone: Negative  / Bili: Small / Urobili: 3 mg/dL   Blood: x / Protein: 100 / Nitrite: Negative   Leuk Esterase: Large / RBC: 24 /hpf /  /HPF   Sq Epi: x / Non Sq Epi: 0 /hpf / Bacteria: Many      Amylase Serum--      Lipase serum--       [id number]  Amylase Serum--      Lipase serum--       [id number]                          7.5    19.04 )-----------( 70       ( 2020 04:58 )             22.5                         9.8    22.59 )-----------( 107      ( 2020 11:10 )             29.6     Imaging: Chief Complaint:  Patient is a 64y old  Male who presents with a chief complaint of fevers (2020 17:57)      Interval Events: Pt is off Levophed. Blood cultures grew Ecoli. Urine culture pending. Pt denies nausea, vomiting, abd pain.   ROS: All 12 point system except listed above were otherwise negative.    Allergies:  codeine (Anaphylaxis)        Hospital Medications:  albumin human 25% IVPB 100 milliLiter(s) IV Intermittent every 6 hours  chlorhexidine 4% Liquid 1 Application(s) Topical <User Schedule>  dextrose 40% Gel 15 Gram(s) Oral once PRN  dextrose 5%. 1000 milliLiter(s) IV Continuous <Continuous>  dextrose 50% Injectable 12.5 Gram(s) IV Push once  dextrose 50% Injectable 25 Gram(s) IV Push once  dextrose 50% Injectable 25 Gram(s) IV Push once  enoxaparin Injectable 40 milliGRAM(s) SubCutaneous daily  folic acid 1 milliGRAM(s) Oral daily  glucagon  Injectable 1 milliGRAM(s) IntraMuscular once PRN  insulin glargine Injectable (LANTUS) 12 Unit(s) SubCutaneous at bedtime  insulin lispro (HumaLOG) corrective regimen sliding scale   SubCutaneous three times a day before meals  lactulose Syrup 20 Gram(s) Oral three times a day  meropenem  IVPB 1000 milliGRAM(s) IV Intermittent every 8 hours  midodrine 20 milliGRAM(s) Oral every 8 hours  multivitamin 1 Tablet(s) Oral daily  norepinephrine Infusion 0.05 MICROgram(s)/kG/Min IV Continuous <Continuous>  pantoprazole    Tablet 40 milliGRAM(s) Oral two times a day  rifAXIMin 550 milliGRAM(s) Oral two times a day      PMHX/PSHX:  GIB (gastrointestinal bleeding)  GERD with esophagitis  Hepatic encephalopathy  Obesity  Fatty liver disease, nonalcoholic  Renal stones  Hypertension  Neuropathy  Hypercholesteremia  Diabetes  S/P cholecystectomy  No significant past surgical history      Family history:  Family history of type 2 diabetes mellitus  Family history of hypertension  Family history of stomach cancer  No pertinent family history in first degree relatives    There is no family history of peptic ulcer disease, gastric cancer, colon polyps, colon cancer, celiac disease, biliary, hepatic, or pancreatic disease.  None of the female relatives have breast, uterine, or ovarian cancer.     ROS:     General:  No wt loss, fevers, chills, night sweats, fatigue,   Eyes:  Good vision, no reported pain  ENT:  No sore throat, pain, runny nose, dysphagia  CV:  No pain, palpitations, hypo/hypertension  Resp:  No dyspnea, cough, tachypnea, wheezing  GI:  See HPI  :  No pain, bleeding, incontinence, nocturia  Muscle:  No pain, weakness  Neuro:  No weakness, tingling, memory problems  Psych:  No fatigue, insomnia, mood problems, depression  Endocrine:  No polyuria, polydipsia, cold/heat intolerance  Heme:  No petechiae, ecchymosis, easy bruisability  Skin:  No rash, edema    PHYSICAL EXAM:   Vital Signs:  Vital Signs Last 24 Hrs  T(C): 36.4 (2020 08:00), Max: 39.6 (2020 10:33)  T(F): 97.6 (2020 08:00), Max: 103.3 (2020 10:33)  HR: 77 (2020 08:00) (77 - 116)  BP: 121/58 (2020 08:00) (80/47 - 144/64)  BP(mean): 84 (2020 08:00) (73 - 91)  RR: 14 (2020 08:00) (8 - 23)  SpO2: 98% (2020 08:00) (95% - 100%)  Daily Height in cm: 193.04 (2020 16:15)    Daily Weight in k (2020 16:15)      PHYSICAL EXAM:     GENERAL:  Appears stated age, well-groomed, well-nourished, no distress  HEENT:  NC/AT,  conjunctivae clear and pink,  no JVD, + Icterus  CHEST:  Full & symmetric excursion, no increased effort, breath sounds clear  HEART:  Regular rhythm, S1, S2, no murmur/rub/S3/S4, no abdominal bruit, no edema  ABDOMEN:  Soft, non-tender, +mildly distended, normoactive bowel sounds,  no masses  EXTREMITIES:  no cyanosis,clubbing or edema  SKIN:  No rash/erythema/ecchymoses/petechiae/wounds/abscess/warm/dry  NEURO:  Alert, oriented x4, no asterixis    LABS:                        7.5    19.04 )-----------( 70       ( 2020 04:58 )             22.5     Mean Cell Volume: 106.1 fl (-20 @ 04:58)        132<L>  |  98  |  24<H>  ----------------------------<  218<H>  4.1   |  23  |  1.10    Ca    9.1      2020 04:58  Phos  2.7       Mg     1.6         TPro  5.1<L>  /  Alb  2.1<L>  /  TBili  8.6<H>  /  DBili  x   /  AST  21  /  ALT  11  /  AlkPhos  97  0212    LIVER FUNCTIONS - ( 2020 04:58 )  Alb: 2.1 g/dL / Pro: 5.1 g/dL / ALK PHOS: 97 U/L / ALT: 11 U/L / AST: 21 U/L / GGT: x           PT/INR - ( 2020 04:58 )   PT: 29.5 sec;   INR: 2.51 ratio         PTT - ( 2020 11:53 )  PTT:35.4 sec  Urinalysis Basic - ( 2020 11:32 )    Color: Orange / Appearance: Turbid / S.018 / pH: x  Gluc: x / Ketone: Negative  / Bili: Small / Urobili: 3 mg/dL   Blood: x / Protein: 100 / Nitrite: Negative   Leuk Esterase: Large / RBC: 24 /hpf /  /HPF   Sq Epi: x / Non Sq Epi: 0 /hpf / Bacteria: Many      Amylase Serum--      Lipase serum--       [id number]  Amylase Serum--      Lipase serum--       [id number]                          7.5    19.04 )-----------( 70       ( 2020 04:58 )             22.5                         9.8    22.59 )-----------( 107      ( 2020 11:10 )             29.6     Imaging:

## 2020-02-12 NOTE — DIETITIAN INITIAL EVALUATION ADULT. - ADD RECOMMEND
1. Continue to monitor PO intake, diet tolerance, weight, labs, skin integrity, food preferences, and further educational needs. 2. Monitor and replete electrolytes as needed. 3. Educated pt on T2DM nutrition education; Reinforce on follow up. 4. Pt made aware RD remains available.

## 2020-02-12 NOTE — PROGRESS NOTE ADULT - ASSESSMENT
ASSESSMENT:  64/M with PMH IDDM, dyslipidemia, obesity, GERD, HFpEF with mild LV diastolic dysfunction, decompensated JEAN cirrhosis c/b ascites, h/o SBP, h/o HE, recent admission for ESBL E. 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  P/w confusion, weakness, and fall at home.  Febrile in ED (Tmax 103.3), tachycardic (110). Labs notable for leucocytosis 22,590, ammonia 67, lactate 5.8, BUN 19, Cr 1.24. CT imaging concerning for emphysematous cystitis  Started on Levophed, txf to MICU. ID consulted for abx recs  ==========  Septic shock likely 2/2 urinary source  - Blood cx showing E. coli, sensitivities awaited  - UA positive. Imaging revealed emphysematous cystitis  - CXR clear, RVP negative.  - past cx have grown ESBL E. coli, Klebsiella pneumoniae and Strep agalactiae  - pt asked about antibiotic prophylaxis for recurrent UTIs. He was on Bactrim for SBP prophylaxis (last urine cx showed sensitivity to Bactrim). However, unsure of options for UTI prophylaxis, given that he now had a UTI while on bactrim    Pre-LVAD and Pre-transplant screening  HIV 4th Generation screen: Negative  Syphilis screen: Negative  EBV Serology: Old infection  Toxoplasma IgG: Negative  Measles IgG: Reactive  Mumps IgG: Reactive  Rubella IgG: Reactive  Varicella IgG: Reactive  HSV 1/2 IgG: HSV-1 IgG reactive; HSV-2 IgG non-reactive  CMV IgG: Non-reactive  MRSA screen: Pending  Hepatitis A IgG: Reactive  Hepatitis B sAg: non-reactive  Hepatitis B sAb: non-reactive  Hepatitis B c IgM Ab: non-reactive  Hepatitic C Ab: non-reactive  Quantiferon: negative    Vaccinations  - would require Hepatitis B  - would require Pneumovax    RECOMMENDATIONS:  1. Septic shock likely 2/2 urinary source  - continue Meropenem 1g IV q8h  - f/u admission blood cx, urine cx  - f/u repeat blood cx ordered for 2/13/2020  - if worsening hemodynamic instability, would repeat blood cultures and add Vancomycin  - will discuss about options for antimicrobial prophylaxis for recurrent UTIs, after culture results obtained  ----------------  2. Pre-transplant  - would check MRSA screen, after resolution of acute issues  - would recommend Hepatitis B and Pneumovax vaccine, after resolution of acute issues  - would defer clearance for transplant, pending resolution of acute issues    Recs conveyed to primary MICU team    CHERI Bruno MD  Fellow, Infectious Diseases  Pager: [phone]  After 5pm and on Weekends: Call [phone]

## 2020-02-12 NOTE — CHART NOTE - NSCHARTNOTEFT_GEN_A_CORE
MICU Transfer Note    Transfer from: MICU    Transfer to: (X) Medicine    (  ) Telemetry     (   ) RCU        (    ) Palliative         (   ) Stroke Unit          (   ) __________________    Accepting Physician: Dr. 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 Pt has felt worsening weakness and lethargy for past two days. At 7AM of the day of admission, pt sustained a fall at home out of bed. He pressed his life alert button and EMS brought him to the ED. Currently, pt states he feels better than he did on admission. He reports weakness, chills, and decreased appetite for past two days. He denies other physical complaints including fevers, nausea/vomiting, chest pain, SOB, abd pain, dysuria, constipation/diarrhea. He reports strict compliance with his medications.     In ED, initial VS were T103.3  /59 RR17 SaO2 98% on RA. Labs notable for WBC of 22.59, ammonia of 67, lactate on VBG of 5.8. CXR was clear, RVP negative. UA grossly positive. Pt became hypotensive to 80s/50s and was started on levophed and admitted to MICU for further management.    While in the MICU, pt was weaned off levophed and has had stable blood pressures on home midodrine. Added pt's home Lasix and spironolactone. pt's ammonia was 58 from 67 on admission. Pt currently on Meropenem for h/o ESBL UTI. BCx grew Ecoli, sensitivities pending. Pt hemodynamically stable and ready for transfer to general medical floor.       ASSESSMENT & PLAN:     #Neuro  - currently A&Ox3, adamantly denies falling at home despite family at bedside stating otherwise.   - NH3 downtrending (58 from 67 on admission)  - will d/c daily ammonia levels  - c/w rifaximin, lactulose for hepatic encephalopathy  - cont to monitor mental status    #CV  - Started on levophed in ED for BP of 80s/50s, most likely septic shock from UTI with possible SBP. Recevied 2.4L fluid resuscitation in ED. Systolic BP currently in 140s systolic in ED. Will wean off levo as tolerated  - pt weaned off pressors (levophed)  - on midodrine 20mg Q8H per most recent hepatology note in 12/2019  - TTE from 12/2019 with EF70%, hyperdynamic LV systolic function, grossly normal RV systolic function  - will restart home spironolactone 25 qd and lasix 40 po every other day  - switched albumin 50 q6 (25%) from 100    #Pulm  - saturating well on RA  - no active issues    #ID  - pt presented in septic shock with T103.3, ED247p, and hypotension refractory to sufficient fluid resuscitation. Lactate 5.8 on admission. Source likely UTI with possible SBP. Pt has hx of ESBL UTI in 12/2019,   - BCx growing Ecoli in anaerobic and aerobic bottles;  - will c/w meropenem with plan to switch to ertapenem per ID tomorrow  - will send off repeat BCx tomorrow AM   - Underwent CT a/p with IV contrast for possible intraabdominal source of infection  - CT showing emphysematous cystitis;        #GI  - Pt has hx of JEAN cirrhosis with recent hospitalization in 12/2019 for acute decompensated liver cirrhosis and ESBL UTI. MELD-Na on admission 25  - Endoscopy in 12/2019 with small, nonbleeding varces, not banded, gastric vascular ectasias w/o bleeding, and portal hypertensive gastropathy. Will c/w pantoprazole 40 BID  - hepatology following  - restart spirnolactone and lasix  - consistent carb diet    #Heme  - Hemoglobin 9.8 on admission. Pt has chronic anemia (baseline 7-8s) due to chronic occult GI blood loss related to known GAVE, PHG, and AVMs, as well as possible spur cell anemia related to his end-stage liver disease  - Hb today 7.5/22.5, likely dilutional from IVF resuscitation  - no signs/sx of active bleeding on exam  - will cont to monitor CBC QD    #Renal/  - Creatinine 1.24 on admission, baseline appears to be 1.0-1.1 from previous admission  - renally dose all meds, avoid nephrotoxins  - CT showing 8mm non obstructing stone on left side; pt w/o pain currently  - BMP QD  - PSA ordered    #Endo  - Pt has IDDM; A1C 5.8 in 12/2019. On Lantus 24units and humalog 8units TID before meals at home. Glucose in 120s on BMP  - will c/w lantus 12 units at night and ISS    #PPx  - dvt ppx: lovenox  - Full code        FOR FOLLOW UP:  -repeat BCx tomorrow morning  -ID considering switching to ertapenem tomorrow pending cultures  -hepatology on board MICU Transfer Note    Transfer from: MICU    Transfer to: (X) Medicine    (  ) Telemetry     (   ) RCU        (    ) Palliative         (   ) Stroke Unit          (   ) __________________    Accepting Physician: Dr. 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 who presents with confusion, weakness, and fall at home. Pt has felt worsening weakness and lethargy for past two days. At 7AM of the day of admission, pt sustained a fall at home out of bed. He pressed his life alert button and EMS brought him to the ED. Currently, pt states he feels better than he did on admission. He reports weakness, chills, and decreased appetite for past two days. He denies other physical complaints including fevers, nausea/vomiting, chest pain, SOB, abd pain, dysuria, constipation/diarrhea. He reports strict compliance with his medications.     In ED, initial VS were T103.3  /59 RR17 SaO2 98% on RA. Labs notable for WBC of 22.59, ammonia of 67, lactate on VBG of 5.8. CXR was clear, RVP negative. UA grossly positive. Pt became hypotensive to 80s/50s and was started on levophed and admitted to MICU for further management.    While in the MICU, pt was weaned off levophed and has had stable blood pressures on home midodrine. Added pt's home Lasix and spironolactone. pt's ammonia was 58 from 67 on admission. Pt currently on Meropenem for h/o ESBL UTI. BCx grew Ecoli, sensitivities pending. Pt hemodynamically stable and ready for transfer to general medical floor.       ASSESSMENT & PLAN:     #Neuro  - currently A&Ox3, adamantly denies falling at home despite family at bedside stating otherwise.   - NH3 downtrending (58 from 67 on admission)  - will d/c daily ammonia levels  - c/w rifaximin, lactulose for hepatic encephalopathy  - cont to monitor mental status    #CV  - Started on levophed in ED for BP of 80s/50s, most likely septic shock from UTI with possible SBP. Recevied 2.4L fluid resuscitation in ED. Systolic BP currently in 140s systolic in ED. Will wean off levo as tolerated  - pt weaned off pressors (levophed)  - on midodrine 20mg Q8H per most recent hepatology note in 12/2019  - TTE from 12/2019 with EF70%, hyperdynamic LV systolic function, grossly normal RV systolic function  - will restart home spironolactone 25 qd and lasix 40 po every other day  - switched albumin 50 q6 (25%) from 100    #Pulm  - saturating well on RA  - no active issues    #ID  - pt presented in septic shock with T103.3, EL930p, and hypotension refractory to sufficient fluid resuscitation. Lactate 5.8 on admission. Source likely UTI with possible SBP. Pt has hx of ESBL UTI in 12/2019,   - BCx growing Ecoli in anaerobic and aerobic bottles;  - will c/w meropenem with plan to switch to ertapenem per ID tomorrow  - will send off repeat BCx tomorrow AM   - Underwent CT a/p with IV contrast for possible intraabdominal source of infection  - CT showing emphysematous cystitis;        #GI  - Pt has hx of JEAN cirrhosis with recent hospitalization in 12/2019 for acute decompensated liver cirrhosis and ESBL UTI. MELD-Na on admission 25  - Endoscopy in 12/2019 with small, nonbleeding varces, not banded, gastric vascular ectasias w/o bleeding, and portal hypertensive gastropathy. Will c/w pantoprazole 40 BID  - hepatology following  - restart spirnolactone and lasix  - consistent carb diet    #Heme  - Hemoglobin 9.8 on admission. Pt has chronic anemia (baseline 7-8s) due to chronic occult GI blood loss related to known GAVE, PHG, and AVMs, as well as possible spur cell anemia related to his end-stage liver disease  - Hb today 7.5/22.5, likely dilutional from IVF resuscitation  - no signs/sx of active bleeding on exam  - will cont to monitor CBC QD    #Renal/  - Creatinine 1.24 on admission, baseline appears to be 1.0-1.1 from previous admission  - renally dose all meds, avoid nephrotoxins  - CT showing 8mm non obstructing stone on left side; pt w/o pain currently  - BMP QD  - PSA ordered    #Endo  - Pt has IDDM; A1C 5.8 in 12/2019. On Lantus 24units and humalog 8units TID before meals at home. Glucose in 120s on BMP  - will c/w lantus 12 units at night and ISS    #PPx  - dvt ppx: lovenox  - Full code        FOR FOLLOW UP:  -repeat BCx tomorrow morning  -ID considering switching to ertapenem tomorrow pending cultures  -hepatology on board

## 2020-02-12 NOTE — PROGRESS NOTE ADULT - ATTENDING COMMENTS
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 coli bacteremia and hepatic encephalopathy.    # Septic shock secondary to emphysematous cystitis with E. coli bacteremia: Off Levophed since ~6:30pm yesterday evening, now back on home midodrine 20 mg po q8h and with SBPs in 110s-120s. Afebrile overnight/today. Continue meropenem (2/11- ) as per Transplant ID and given prior history of ESBL E. coli infection. Low clinical suspicion for SBP at this time as only small volume ascites seen on CT, insufficient for safe paracentesis. Follow-up blood culture sensitivities to confirm ESBL, and repeat blood cultures tomorrow as per Transplant ID. Continue fluid resuscitation with albumin 25% 100 mL iv q6h.    # Hepatic encephalopathy: Resolved, now appears back to his baseline mental status. Continue rifaximin and lactulose.    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 Hb/HCT currently stable.    # Hypervolemic hyponatremia: Continue IV albumin for fluid resuscitation, as above, and oral fluid restriction to <1.5L/day.    # Ascites with history of anasarca: Currently with only mild ascites and no peripheral edema. Hold home diuretics for now in setting of sepsis.    # Decompensated JEAN cirrhosis: Listed for liver transplant at [facility] with MELD-Na 29 (2/11/20), blood type A. Current MELD-Na 28 today.    Please don't hesitate to call with any questions/concerns.    Letitia Mo M.D., Ph.D.  Transplant Hepatology  Cell: [phone]

## 2020-02-12 NOTE — PROGRESS NOTE ADULT - ATTENDING COMMENTS
1. ESBL E coli bacteremia. Also with UTI. Continue meropenem. Pt is off pressors.  2.JEAN Cirrhosis. Continue midodrine, Rifaximin and lactulose. Restart Spironolactone and Lasix.

## 2020-02-12 NOTE — PROGRESS NOTE ADULT - ASSESSMENT
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 coli bacteremia

## 2020-02-12 NOTE — DIETITIAN INITIAL EVALUATION ADULT. - ETIOLOGY
increased physiological demands of catabolic illness Limited previous exposure to previous nutrition education

## 2020-02-12 NOTE — DIETITIAN INITIAL EVALUATION ADULT. - PERTINENT MEDS FT
Humalog sliding scale, Lantus, multivitamin, folic acid, lactulose syrup, Proamantine, Levophed, Protonix

## 2020-02-12 NOTE — DIETITIAN INITIAL EVALUATION ADULT. - OTHER INFO
Per medical record pt is a 63 yo male with PMH: T2DM, dyslipidemia, GERD, HFpEF, decompensated JEAN cirrhosis, hepatic encephalopathy, GIB, UNOS listed for liver transplant (current MELD 29), presents with confusion, weakness, after a fall at home, found to be in septic shock secondary to UTI.    Pt reports poor appetite and PO intake x 2days PTA due to not feeling well. States he followed a low sodium diet and "watched sugars" PTA. States he took iron supplements PTA. States NKFA. Pt with insulin controlled T2DM. Pt states he checks finger sticks x 3/day with ranges from 100-160. HbA1c 5.7% (12/3) shows good blood glucose control, however low HbA1c may be due to liver dysfunction.     Pt reports good appetite and PO intake in house. States 75% of dinner consumed yesterday (2/11) and observed 100% of breakfast consumed at time of interview. Denies difficulty chewing or swallowing. States no N+V. States last BM this morning (2/12) with no GI distress.     Pt states intentional weight loss of 110 pounds x 5 months. States he has been consuming less food. States  pounds. Per previous RD notes and HIE: (4/3/19) 286 pounds (10/22/19) 300 pounds (11/26/19) 255 pounds (1/3/20) 270 pounds. Current charted dosing weight is 251.3 pounds. ?accuracy of weights as pt with recurrent fluid shifts and 1+ generalized edema currently. Will continue to monitor.     Pt requested and given Type 2 DM nutrition therapy education. Discussed carbohydrate foods, well rounded diet, portion control, avoiding concentrated sweets, and pairing carbohydrates with protein. Provided Type 2 DM nutrition therapy handout. Per medical record pt is a 63 yo male with PMH: T2DM, dyslipidemia, GERD, HFpEF, decompensated JEAN cirrhosis, hepatic encephalopathy, GIB, UNOS listed for liver transplant (current MELD 29), presents with confusion, weakness, after a fall at home, found to be in septic shock secondary to UTI.    Pt reports poor appetite and PO intake x 2days PTA due to not feeling well. States he followed a low sodium diet and "watched sugars" PTA. States he took iron supplements PTA. States NKFA. Pt with insulin controlled T2DM. Pt states he checks finger sticks x 3/day with ranges from 100-160. HbA1c 5.7% (12/3) shows good blood glucose control, however low HbA1c may be due to liver dysfunction.     Pt reports good appetite and PO intake in house. States 75% of dinner consumed yesterday (2/11) and observed 100% of breakfast consumed at time of interview. Denies difficulty chewing or swallowing. States no N+V. States last BM this morning (2/12) with no GI distress. Per flow sheets pt had 2 loose BMs today (2/12); noted pt on lactulose syrup.      Pt states intentional weight loss of 110 pounds x 5 months. States he has been consuming less food. States  pounds. Per previous RD notes and HIE: (4/3/19) 286 pounds (10/22/19) 300 pounds (11/26/19) 255 pounds (1/3/20) 270 pounds. Current charted dosing weight is 251.3 pounds. ?accuracy of weights as pt with recurrent fluid shifts and 1+ generalized edema currently. Will continue to monitor.     Pt requested and given Type 2 DM nutrition therapy education. Discussed carbohydrate foods, well rounded diet, portion control, avoiding concentrated sweets, and pairing carbohydrates with protein. Provided Type 2 DM nutrition therapy handout.

## 2020-02-12 NOTE — PROGRESS NOTE ADULT - ASSESSMENT
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    #Neuro  - currently A&Ox2-3, adamantly denies falling at home despite family at bedside stating otherwise.   - ammonia level 67 on admission, slightly higher than prior admission in 12/2019  - c/w rifaximin, lactulose for hepatic encephalopathy  - cont to monitor mental status    #CV  - Started on levophed in ED for BP of 80s/50s, most likely septic shock from UTI with possible SBP. Recevied 2.4L fluid resuscitation in ED. Systolic BP currently in 140s systolic in ED. Will wean off levo as tolerated  - on midodrine 20mg Q8H per most recent hepatology note in 12/2019, will continue and wean off IV pressors  - TTE from 12/2019 with EF70%, hyperdynamic LV systolic function, grossly normal RV systolic function    #Pulm  - saturating well on RA  - no active issues    #ID  - pt presented in septic shock with T103.3, GS750h, and hypotension refractory to sufficient fluid resuscitation. Lactate 5.8 on admission. Source likely UTI with possible SBP. Pt has hx of ESBL UTI in 12/2019, will c/w meropenem   - Underwent CT a/p with IV contrast for possible intraabdominal source of infection  - pt will need diagnostic paracentesis, will assess ability to perform at bedside upon arrival in MICU  - f/u urine and blood cultures  - wean off vasopressors as tolerated    #GI  - Pt has hx of JEAN cirrhosis with recent hospitalization in 12/2019 for acute decompensated liver cirrhosis and ESBL UTI. MELD-Na on admission 25  - Endoscopy in 12/2019 with small, nonbleeding varces, not banded, gastric vascular ectasias w/o bleeding, and portal hypertensive gastropathy. Will c/w pantoprazole 40 BID  - will c/s hepatology for further recs  - will hold home spironolactone and lasix in setting of septic shock/hypotension. Add back as clinically tolerate  - consistent carb diet    #Heme  - Hemoglobin 9.8 on admission. Pt has chronic anemia (baseline 7-8s) due to chronic occult GI blood loss related to known GAVE, PHG, and AVMs, as well as possible spur cell anemia related to his end-stage liver disease  - no signs/sx of active bleeding on exam  - will cont to monitor CBC QD    #Renal  - Creatinine 1.24 on admission, baseline appears to be 1.0-1.1 from previous admission  - renally dose all meds, avoid nephrotoxins  - BMP QD    #Endo  - Pt has IDDM; A1C 5.8 in 12/2019. On Lantus 24units and humalog 8units TID before meals at home. 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    #Neuro  - currently A&Ox3, adamantly denies falling at home despite family at bedside stating otherwise.   - NH3 downtrending (58 from 67 on admission)  - will d/c daily ammonia levels  - c/w rifaximin, lactulose for hepatic encephalopathy  - cont to monitor mental status    #CV  - Started on levophed in ED for BP of 80s/50s, most likely septic shock from UTI with possible SBP. Recevied 2.4L fluid resuscitation in ED. Systolic BP currently in 140s systolic in ED. Will wean off levo as tolerated  - pt weaned off pressors (levophed)  - on midodrine 20mg Q8H per most recent hepatology note in 12/2019  - TTE from 12/2019 with EF70%, hyperdynamic LV systolic function, grossly normal RV systolic function  - will restart home spironolactone 25 qd and lasix 40 po every other day  - switched albumin 50 q6 (25%) from 100    #Pulm  - saturating well on RA  - no active issues    #ID  - pt presented in septic shock with T103.3, YJ685m, and hypotension refractory to sufficient fluid resuscitation. Lactate 5.8 on admission. Source likely UTI with possible SBP. Pt has hx of ESBL UTI in 12/2019,   - BCx growing Ecoli in anaerobic and aerobic bottles;  - will c/w meropenem with plan to switch to ertapenem per ID tomorrow  - will send off repeat BCx tomorrow AM   - Underwent CT a/p with IV contrast for possible intraabdominal source of infection  - CT showing emphysematous cystitis;        #GI  - Pt has hx of JEAN cirrhosis with recent hospitalization in 12/2019 for acute decompensated liver cirrhosis and ESBL UTI. MELD-Na on admission 25  - Endoscopy in 12/2019 with small, nonbleeding varces, not banded, gastric vascular ectasias w/o bleeding, and portal hypertensive gastropathy. Will c/w pantoprazole 40 BID  - hepatology following  - restart spirnolactone and lasix  - consistent carb diet    #Heme  - Hemoglobin 9.8 on admission. Pt has chronic anemia (baseline 7-8s) due to chronic occult GI blood loss related to known GAVE, PHG, and AVMs, as well as possible spur cell anemia related to his end-stage liver disease  - Hb today 7.5/22.5, likely dilutional from IVF resuscitation  - no signs/sx of active bleeding on exam  - will cont to monitor CBC QD    #Renal/  - Creatinine 1.24 on admission, baseline appears to be 1.0-1.1 from previous admission  - renally dose all meds, avoid nephrotoxins  - BMP QD  - PSA ordered    #Endo  - Pt has IDDM; A1C 5.8 in 12/2019. On Lantus 24units and humalog 8units TID before meals at home. Glucose in 120s on BMP  - will c/w lantus 12 units at night and ISS    #PPx  - dvt ppx: lovenox  - Full code    Dispo:  plan to transfer to floors    JK PGY1  MICU

## 2020-02-13 DIAGNOSIS — K72.90 HEPATIC FAILURE, UNSPECIFIED WITHOUT COMA: ICD-10-CM

## 2020-02-13 DIAGNOSIS — D47.2 MONOCLONAL GAMMOPATHY: ICD-10-CM

## 2020-02-13 LAB
-  AMIKACIN: SIGNIFICANT CHANGE UP
-  AMPICILLIN/SULBACTAM: SIGNIFICANT CHANGE UP
-  AMPICILLIN: SIGNIFICANT CHANGE UP
-  AZTREONAM: SIGNIFICANT CHANGE UP
-  CEFAZOLIN: SIGNIFICANT CHANGE UP
-  CEFEPIME: SIGNIFICANT CHANGE UP
-  CEFOXITIN: SIGNIFICANT CHANGE UP
-  CEFTRIAXONE: SIGNIFICANT CHANGE UP
-  CIPROFLOXACIN: SIGNIFICANT CHANGE UP
-  ERTAPENEM: SIGNIFICANT CHANGE UP
-  GENTAMICIN: SIGNIFICANT CHANGE UP
-  IMIPENEM: SIGNIFICANT CHANGE UP
-  LEVOFLOXACIN: SIGNIFICANT CHANGE UP
-  MEROPENEM: SIGNIFICANT CHANGE UP
-  NITROFURANTOIN: SIGNIFICANT CHANGE UP
-  PIPERACILLIN/TAZOBACTAM: SIGNIFICANT CHANGE UP
-  TIGECYCLINE: SIGNIFICANT CHANGE UP
-  TOBRAMYCIN: SIGNIFICANT CHANGE UP
-  TRIMETHOPRIM/SULFAMETHOXAZOLE: SIGNIFICANT CHANGE UP
ALBUMIN SERPL ELPH-MCNC: 2.5 G/DL — LOW (ref 3.3–5)
ALP SERPL-CCNC: 130 U/L — HIGH (ref 40–120)
ALT FLD-CCNC: 15 U/L — SIGNIFICANT CHANGE UP (ref 10–45)
ANION GAP SERPL CALC-SCNC: 7 MMOL/L — SIGNIFICANT CHANGE UP (ref 5–17)
APTT BLD: 40.1 SEC — HIGH (ref 27.5–36.3)
AST SERPL-CCNC: 20 U/L — SIGNIFICANT CHANGE UP (ref 10–40)
BILIRUB SERPL-MCNC: 7.4 MG/DL — HIGH (ref 0.2–1.2)
BUN SERPL-MCNC: 22 MG/DL — SIGNIFICANT CHANGE UP (ref 7–23)
CALCIUM SERPL-MCNC: 9.5 MG/DL — SIGNIFICANT CHANGE UP (ref 8.4–10.5)
CHLORIDE SERPL-SCNC: 98 MMOL/L — SIGNIFICANT CHANGE UP (ref 96–108)
CO2 SERPL-SCNC: 26 MMOL/L — SIGNIFICANT CHANGE UP (ref 22–31)
CREAT SERPL-MCNC: 1 MG/DL — SIGNIFICANT CHANGE UP (ref 0.5–1.3)
CULTURE RESULTS: SIGNIFICANT CHANGE UP
GLUCOSE BLDC GLUCOMTR-MCNC: 144 MG/DL — HIGH (ref 70–99)
GLUCOSE BLDC GLUCOMTR-MCNC: 182 MG/DL — HIGH (ref 70–99)
GLUCOSE BLDC GLUCOMTR-MCNC: 233 MG/DL — HIGH (ref 70–99)
GLUCOSE BLDC GLUCOMTR-MCNC: 252 MG/DL — HIGH (ref 70–99)
GLUCOSE SERPL-MCNC: 193 MG/DL — HIGH (ref 70–99)
GRAM STN FLD: SIGNIFICANT CHANGE UP
HCT VFR BLD CALC: 25.7 % — LOW (ref 39–50)
HGB BLD-MCNC: 8.3 G/DL — LOW (ref 13–17)
INR BLD: 2.16 RATIO — HIGH (ref 0.88–1.16)
MCHC RBC-ENTMCNC: 32.3 GM/DL — SIGNIFICANT CHANGE UP (ref 32–36)
MCHC RBC-ENTMCNC: 34.9 PG — HIGH (ref 27–34)
MCV RBC AUTO: 108 FL — HIGH (ref 80–100)
METHOD TYPE: SIGNIFICANT CHANGE UP
NRBC # BLD: 0 /100 WBCS — SIGNIFICANT CHANGE UP (ref 0–0)
ORGANISM # SPEC MICROSCOPIC CNT: SIGNIFICANT CHANGE UP
ORGANISM # SPEC MICROSCOPIC CNT: SIGNIFICANT CHANGE UP
PLATELET # BLD AUTO: 83 K/UL — LOW (ref 150–400)
POTASSIUM SERPL-MCNC: 4.2 MMOL/L — SIGNIFICANT CHANGE UP (ref 3.5–5.3)
POTASSIUM SERPL-SCNC: 4.2 MMOL/L — SIGNIFICANT CHANGE UP (ref 3.5–5.3)
PROT SERPL-MCNC: 6 G/DL — SIGNIFICANT CHANGE UP (ref 6–8.3)
PROTHROM AB SERPL-ACNC: 25.2 SEC — HIGH (ref 10–12.9)
RBC # BLD: 2.38 M/UL — LOW (ref 4.2–5.8)
RBC # FLD: 14.8 % — HIGH (ref 10.3–14.5)
SODIUM SERPL-SCNC: 131 MMOL/L — LOW (ref 135–145)
SPECIMEN SOURCE: SIGNIFICANT CHANGE UP
WBC # BLD: 11.57 K/UL — HIGH (ref 3.8–10.5)
WBC # FLD AUTO: 11.57 K/UL — HIGH (ref 3.8–10.5)

## 2020-02-13 PROCEDURE — 99232 SBSQ HOSP IP/OBS MODERATE 35: CPT | Mod: GC

## 2020-02-13 PROCEDURE — 99233 SBSQ HOSP IP/OBS HIGH 50: CPT

## 2020-02-13 PROCEDURE — 99232 SBSQ HOSP IP/OBS MODERATE 35: CPT

## 2020-02-13 RX ORDER — ERTAPENEM SODIUM 1 G/1
1000 INJECTION, POWDER, LYOPHILIZED, FOR SOLUTION INTRAMUSCULAR; INTRAVENOUS EVERY 24 HOURS
Refills: 0 | Status: COMPLETED | OUTPATIENT
Start: 2020-02-13 | End: 2020-02-17

## 2020-02-13 RX ORDER — INSULIN LISPRO 100/ML
4 VIAL (ML) SUBCUTANEOUS
Refills: 0 | Status: DISCONTINUED | OUTPATIENT
Start: 2020-02-13 | End: 2020-02-18

## 2020-02-13 RX ORDER — ALBUMIN HUMAN 25 %
100 VIAL (ML) INTRAVENOUS EVERY 6 HOURS
Refills: 0 | Status: DISCONTINUED | OUTPATIENT
Start: 2020-02-13 | End: 2020-02-14

## 2020-02-13 RX ADMIN — MIDODRINE HYDROCHLORIDE 20 MILLIGRAM(S): 2.5 TABLET ORAL at 13:14

## 2020-02-13 RX ADMIN — Medication 0: at 09:04

## 2020-02-13 RX ADMIN — Medication 1: at 12:43

## 2020-02-13 RX ADMIN — MIDODRINE HYDROCHLORIDE 20 MILLIGRAM(S): 2.5 TABLET ORAL at 21:47

## 2020-02-13 RX ADMIN — Medication 1 MILLIGRAM(S): at 12:46

## 2020-02-13 RX ADMIN — PANTOPRAZOLE SODIUM 40 MILLIGRAM(S): 20 TABLET, DELAYED RELEASE ORAL at 06:05

## 2020-02-13 RX ADMIN — Medication 40 MILLIGRAM(S): at 06:05

## 2020-02-13 RX ADMIN — SPIRONOLACTONE 25 MILLIGRAM(S): 25 TABLET, FILM COATED ORAL at 06:05

## 2020-02-13 RX ADMIN — MIDODRINE HYDROCHLORIDE 20 MILLIGRAM(S): 2.5 TABLET ORAL at 06:05

## 2020-02-13 RX ADMIN — ENOXAPARIN SODIUM 40 MILLIGRAM(S): 100 INJECTION SUBCUTANEOUS at 12:45

## 2020-02-13 RX ADMIN — INSULIN GLARGINE 12 UNIT(S): 100 INJECTION, SOLUTION SUBCUTANEOUS at 21:46

## 2020-02-13 RX ADMIN — Medication 50 MILLILITER(S): at 18:14

## 2020-02-13 RX ADMIN — Medication 50 MILLILITER(S): at 13:15

## 2020-02-13 RX ADMIN — Medication 1 TABLET(S): at 12:45

## 2020-02-13 RX ADMIN — ERTAPENEM SODIUM 120 MILLIGRAM(S): 1 INJECTION, POWDER, LYOPHILIZED, FOR SOLUTION INTRAMUSCULAR; INTRAVENOUS at 16:25

## 2020-02-13 RX ADMIN — MEROPENEM 100 MILLIGRAM(S): 1 INJECTION INTRAVENOUS at 06:05

## 2020-02-13 RX ADMIN — Medication 3: at 18:17

## 2020-02-13 RX ADMIN — LACTULOSE 20 GRAM(S): 10 SOLUTION ORAL at 21:46

## 2020-02-13 RX ADMIN — PANTOPRAZOLE SODIUM 40 MILLIGRAM(S): 20 TABLET, DELAYED RELEASE ORAL at 18:20

## 2020-02-13 RX ADMIN — Medication 50 MILLILITER(S): at 06:04

## 2020-02-13 RX ADMIN — LACTULOSE 20 GRAM(S): 10 SOLUTION ORAL at 13:15

## 2020-02-13 RX ADMIN — LACTULOSE 20 GRAM(S): 10 SOLUTION ORAL at 06:05

## 2020-02-13 RX ADMIN — Medication 4 UNIT(S): at 18:18

## 2020-02-13 NOTE — PROGRESS NOTE ADULT - ATTENDING COMMENTS
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 coli bacteremia and hepatic encephalopathy.    # Septic shock secondary to emphysematous cystitis with E. coli bacteremia: Now hemodynamically stable for >24h and transferred from MICU to floor yesterday. Continue home midodrine 20 mg po q8h. Continue meropenem (2/11- ) as per Transplant ID, with plan for likely 10-14 day course of carbapenem therapy (will likely need midline for home infusion but will wait to ensure repeat blood cultures negative before placing). Can discontinue albumin therapy.    # Hepatic encephalopathy: Resolved, now appears back to his baseline mental status. Continue rifaximin and lactulose.    # Ascites with history of anasarca: Currently with only mild ascites and no peripheral edema. Continue furosemide 40 mg po daily and spironolactone 25 mg po daily. Continue sodium restricted diet.    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 Hb/HCT currently stable.    # Hypervolemic hyponatremia: Can discontinue IV albumin, as above. Continue oral fluid restriction to <1.5L/day.    # Decompensated JEAN cirrhosis: Listed for liver transplant at [facility] with MELD-Na 29 (2/11/20), blood type A. Current MELD-Na 26 today.    Please don't hesitate to call with any questions/concerns.    Letitia Mo M.D., Ph.D.  Transplant Hepatology  Cell: [phone]

## 2020-02-13 NOTE — PROGRESS NOTE ADULT - PROBLEM SELECTOR PLAN 1
septic shock 2/2 to emphysematous cystitis on CT with nonobstruction L renal calculus 8mm UCX 2/11 with gram neg rods   and  E.coli bacteremia, prior cultures ESBL   -continue with meropenem 1gm q8hr, repeat BCX pending  -continue with midodrine 20mg q8hr  c/w albumin 100mg q6hr

## 2020-02-13 NOTE — PHYSICAL THERAPY INITIAL EVALUATION ADULT - ADDITIONAL COMMENTS
Pt lives in a  with his children and spouse +3 steps to enter with HR. All needs met on main floor. Was ambulating (I) with R/W, was (I) with all ADLs Pt lives in a  with his children and spouse +3 steps to enter with HR. All needs met on main floor. Was ambulating (I) with R/W, was (I) with all ADLs. Pt bed elevates.

## 2020-02-13 NOTE — PROGRESS NOTE ADULT - ASSESSMENT
Pt is a 64M with IDDM, dyslipidemia, obesity, GERD, HFpEF with mild LV diastolic dysfunction, and decompensated JEAN cirrhosis complicated by ascites, history of SBP, hepatic encephalopathy, and chronic anemia with a history of duodenal ulcer as well as GAVE and duodenal AVM s/p APC (last on 10/11/19), who is UNOS listed for liver transplantation at [facility] who presents with confusion, weakness, and fall at home found to be in septic shock 2/2 e. coli bacteremia

## 2020-02-13 NOTE — PROGRESS NOTE ADULT - SUBJECTIVE AND OBJECTIVE BOX
Pt seen, c/o freq urination    MEDICATIONS  (STANDING):  albumin human 25% IVPB 100 milliLiter(s) IV Intermittent every 6 hours  dextrose 5%. 1000 milliLiter(s) (50 mL/Hr) IV Continuous <Continuous>  dextrose 50% Injectable 12.5 Gram(s) IV Push once  dextrose 50% Injectable 25 Gram(s) IV Push once  dextrose 50% Injectable 25 Gram(s) IV Push once  enoxaparin Injectable 40 milliGRAM(s) SubCutaneous daily  ertapenem  IVPB 1000 milliGRAM(s) IV Intermittent every 24 hours  folic acid 1 milliGRAM(s) Oral daily  insulin glargine Injectable (LANTUS) 12 Unit(s) SubCutaneous at bedtime  insulin lispro (HumaLOG) corrective regimen sliding scale   SubCutaneous three times a day before meals  insulin lispro (HumaLOG) corrective regimen sliding scale   SubCutaneous at bedtime  insulin lispro Injectable (HumaLOG) 4 Unit(s) SubCutaneous three times a day before meals  lactulose Syrup 20 Gram(s) Oral three times a day  midodrine 20 milliGRAM(s) Oral every 8 hours  multivitamin 1 Tablet(s) Oral daily  pantoprazole    Tablet 40 milliGRAM(s) Oral two times a day  rifAXIMin 550 milliGRAM(s) Oral two times a day    MEDICATIONS  (PRN):  dextrose 40% Gel 15 Gram(s) Oral once PRN Blood Glucose LESS THAN 70 milliGRAM(s)/deciliter  glucagon  Injectable 1 milliGRAM(s) IntraMuscular once PRN Glucose LESS THAN 70 milligrams/deciliter      ROS  No fever, sweats, chills  No epistaxis, HA, sore throat  No CP, SOB, cough, sputum  No n/v/d, abd pain, melena, hematochezia  No edema  No rash  No anxiety  No back pain, joint pain  No bleeding, bruising  No dysuria, hematuria    Vital Signs Last 24 Hrs  T(C): 36.8 (13 Feb 2020 11:39), Max: 37.1 (12 Feb 2020 20:26)  T(F): 98.2 (13 Feb 2020 11:39), Max: 98.7 (12 Feb 2020 20:26)  HR: 81 (13 Feb 2020 15:05) (68 - 92)  BP: 121/66 (13 Feb 2020 15:05) (110/65 - 136/69)  BP(mean): --  RR: 18 (13 Feb 2020 11:39) (18 - 18)  SpO2: 94% (13 Feb 2020 15:05) (94% - 95%)    PE  NAD  Awake, alert  Anicteric, MMM  RRR  CTAB  Abd soft, NT, ND  No c/c/e  No rash grossly  FROM                          8.3    11.57 )-----------( 83       ( 13 Feb 2020 12:05 )             25.7       02-13    131<L>  |  98  |  22  ----------------------------<  193<H>  4.2   |  26  |  1.00    Ca    9.5      13 Feb 2020 12:05  Phos  2.7     02-12  Mg     1.6     02-12    TPro  6.0  /  Alb  2.5<L>  /  TBili  7.4<H>  /  DBili  x   /  AST  20  /  ALT  15  /  AlkPhos  130<H>  02-13

## 2020-02-13 NOTE — PHYSICAL THERAPY INITIAL EVALUATION ADULT - PLANNED THERAPY INTERVENTIONS, PT EVAL
balance training/bed mobility training/strengthening/transfer training/stair negotiation GOAL: Pt will ascend/descend (3) steps (I) with U HR and step over step pattern in 4 weeks./gait training

## 2020-02-13 NOTE — PROGRESS NOTE ADULT - SUBJECTIVE AND OBJECTIVE BOX
Follow Up: Pre-liver transplant eval, sepsis    Interval History/ROS:Patient is a 64y old  Male who presents with a chief complaint of fevers (2020 17:57)  - pt transferred out of MICU yesterday  - no acute events reported overnight  - assessed at bedside this AM. Reports increased frequency of urination, with pain when he passes urine. Denied fevers, chills    Allergies    codeine (Anaphylaxis)    Intolerances        ANTIMICROBIALS:  meropenem  IVPB 1000 every 8 hours  rifAXIMin 550 two times a day      OTHER MEDS:  albumin human 25% IVPB 50 milliLiter(s) IV Intermittent every 6 hours  chlorhexidine 4% Liquid 1 Application(s) Topical <User Schedule>  dextrose 40% Gel 15 Gram(s) Oral once PRN  dextrose 5%. 1000 milliLiter(s) IV Continuous <Continuous>  dextrose 50% Injectable 12.5 Gram(s) IV Push once  dextrose 50% Injectable 25 Gram(s) IV Push once  dextrose 50% Injectable 25 Gram(s) IV Push once  enoxaparin Injectable 40 milliGRAM(s) SubCutaneous daily  folic acid 1 milliGRAM(s) Oral daily  glucagon  Injectable 1 milliGRAM(s) IntraMuscular once PRN  insulin glargine Injectable (LANTUS) 12 Unit(s) SubCutaneous at bedtime  insulin lispro (HumaLOG) corrective regimen sliding scale   SubCutaneous three times a day before meals  lactulose Syrup 20 Gram(s) Oral three times a day  midodrine 20 milliGRAM(s) Oral every 8 hours  multivitamin 1 Tablet(s) Oral daily  norepinephrine Infusion 0.05 MICROgram(s)/kG/Min IV Continuous <Continuous>  pantoprazole    Tablet 40 milliGRAM(s) Oral two times a day      Vital Signs Last 24 Hrs  T(C): 36.4 (2020 08:00), Max: 36.9 (2020 20:00)  T(F): 97.6 (2020 08:00), Max: 98.4 (2020 20:00)  HR: 82 (2020 11:00) (77 - 116)  BP: 120/55 (2020 11:00) (94/51 - 144/64)  BP(mean): 81 (2020 11:00) (73 - 91)  RR: 32 (2020 11:00) (8 - 32)  SpO2: 96% (2020 11:00) (95% - 100%)    EXAM:  Constitutional: Not in acute distress  Eyes: No icterus. Pupils b/l reactive to light. Generalized icterus noted  Oral cavity: Clear, no lesions  Neck: No neck vein distension noted  RS: Chest clear to auscultation bilaterally. No wheeze/rhonchi/crepitations.  CVS: S1, S2 heard. Regular rate and rhythm. ?Pansystolic murmur heard. No rubs/gallops.  Abdomen: Soft. No guarding/rigidity/tenderness.  : No acute abnormalities  Extremities: Warm. B/l pedal edema  Skin: No lesions noted  Vascular: No evidence of phlebitis  Neuro: Alert, oriented to time/place/person. No asterixis noted today                        7.5    19.04 )-----------( 70       ( 2020 04:58 )             22.5       02-12    132<L>  |  98  |  24<H>  ----------------------------<  218<H>  4.1   |  23  |  1.10    Ca    9.1      2020 04:58  Phos  2.7     02-12  Mg     1.6     02-12    TPro  5.1<L>  /  Alb  2.1<L>  /  TBili  8.6<H>  /  DBili  x   /  AST  21  /  ALT  11  /  AlkPhos  97  02-12      Urinalysis Basic - ( 2020 11:32 )    Color: Orange / Appearance: Turbid / S.018 / pH: x  Gluc: x / Ketone: Negative  / Bili: Small / Urobili: 3 mg/dL   Blood: x / Protein: 100 / Nitrite: Negative   Leuk Esterase: Large / RBC: 24 /hpf /  /HPF   Sq Epi: x / Non Sq Epi: 0 /hpf / Bacteria: Many        MICROBIOLOGY:Culture Results:   Growth in aerobic bottle:  Gram Negative Rods  Growth in anaerobic bottle:  Gram Negative Rods  ***Blood Panel PCR results on this specimen are available  approximately 3 hours after the Gram stain result.***  Gram stain, PCR, and/or culture results may not always  correspond due to difference in methodologies.  ************************************************************  This PCR assay was performed using [employer].  The following targets are tested for: Enterococcus,  vancomycin resistant enterococci, Listeria monocytogenes,  coagulase negative staphylococci, S. aureus,  methicillin resistant S. aureus, Streptococcus agalactiae  (Group B), S. pneumoniae, S. pyogenes (Group A),  Acinetobacter baumannii, Enterobacter cloacae, E. coli,  Klebsiella oxytoca, K. pneumoniae, Proteus sp.,  Serratia marcescens, Haemophilus influenzae,  Neisseria meningitidis, Pseudomonas aeruginosa, Candida  albicans, C. glabrata, C krusei, C parapsilosis,  C. tropicalis and the KPC resistance gene. ( @ 16:55)  Culture Results:   Growth in aerobic bottle: Gram Negative Rods ( @ 16:55)    Culture - Urine (20 @ 15:16)    Specimen Source: .Urine Clean Catch (Midstream)    Culture Results:   >100,000 CFU/ml Gram Negative Rods Follow Up: Pre-liver transplant eval, sepsis    Interval History/ROS:Patient is a 64y old  Male who presents with a chief complaint of fevers (2020 17:57)  - pt transferred out of MICU yesterday  - no acute events reported overnight  - assessed at bedside this AM. Reports increased frequency of urination, with pain when he passes urine. Denied fevers, chills    Allergies    codeine (Anaphylaxis)    Intolerances        ANTIMICROBIALS:  meropenem  IVPB 1000 every 8 hours  rifAXIMin 550 two times a day      OTHER MEDS:  albumin human 25% IVPB 50 milliLiter(s) IV Intermittent every 6 hours  chlorhexidine 4% Liquid 1 Application(s) Topical <User Schedule>  dextrose 40% Gel 15 Gram(s) Oral once PRN  dextrose 5%. 1000 milliLiter(s) IV Continuous <Continuous>  dextrose 50% Injectable 12.5 Gram(s) IV Push once  dextrose 50% Injectable 25 Gram(s) IV Push once  dextrose 50% Injectable 25 Gram(s) IV Push once  enoxaparin Injectable 40 milliGRAM(s) SubCutaneous daily  folic acid 1 milliGRAM(s) Oral daily  glucagon  Injectable 1 milliGRAM(s) IntraMuscular once PRN  insulin glargine Injectable (LANTUS) 12 Unit(s) SubCutaneous at bedtime  insulin lispro (HumaLOG) corrective regimen sliding scale   SubCutaneous three times a day before meals  lactulose Syrup 20 Gram(s) Oral three times a day  midodrine 20 milliGRAM(s) Oral every 8 hours  multivitamin 1 Tablet(s) Oral daily  norepinephrine Infusion 0.05 MICROgram(s)/kG/Min IV Continuous <Continuous>  pantoprazole    Tablet 40 milliGRAM(s) Oral two times a day      Vital Signs Last 24 Hrs  T(C): 36.4 (2020 08:00), Max: 36.9 (2020 20:00)  T(F): 97.6 (2020 08:00), Max: 98.4 (2020 20:00)  HR: 82 (2020 11:00) (77 - 116)  BP: 120/55 (2020 11:00) (94/51 - 144/64)  BP(mean): 81 (2020 11:00) (73 - 91)  RR: 32 (2020 11:00) (8 - 32)  SpO2: 96% (2020 11:00) (95% - 100%)    EXAM:  Constitutional: Not in acute distress  Eyes: No icterus. Pupils b/l reactive to light. Generalized icterus noted  Oral cavity: Clear, no lesions  Neck: No neck vein distension noted  RS: Chest clear to auscultation bilaterally. No wheeze/rhonchi/crepitations.  CVS: S1, S2 heard. Regular rate and rhythm. ?Pansystolic murmur heard. No rubs/gallops.  Abdomen: Soft. No guarding/rigidity/tenderness.  : No acute abnormalities  Extremities: Warm. B/l pedal edema  Skin: No lesions noted  Vascular: No evidence of phlebitis  Neuro: Alert, oriented to time/place/person. No asterixis noted today                        7.5    19.04 )-----------( 70       ( 2020 04:58 )             22.5       02-12    132<L>  |  98  |  24<H>  ----------------------------<  218<H>  4.1   |  23  |  1.10    Ca    9.1      2020 04:58  Phos  2.7     02-12  Mg     1.6     02-12    TPro  5.1<L>  /  Alb  2.1<L>  /  TBili  8.6<H>  /  DBili  x   /  AST  21  /  ALT  11  /  AlkPhos  97  02-12      Urinalysis Basic - ( 2020 11:32 )    Color: Orange / Appearance: Turbid / S.018 / pH: x  Gluc: x / Ketone: Negative  / Bili: Small / Urobili: 3 mg/dL   Blood: x / Protein: 100 / Nitrite: Negative   Leuk Esterase: Large / RBC: 24 /hpf /  /HPF   Sq Epi: x / Non Sq Epi: 0 /hpf / Bacteria: Many        MICROBIOLOGY:Culture Results:   Growth in aerobic bottle:  Gram Negative Rods  Growth in anaerobic bottle:  Gram Negative Rods  ***Blood Panel PCR results on this specimen are available  approximately 3 hours after the Gram stain result.***  Gram stain, PCR, and/or culture results may not always  correspond due to difference in methodologies.  ************************************************************  This PCR assay was performed using [email or web address].  The following targets are tested for: Enterococcus,  vancomycin resistant enterococci, Listeria monocytogenes,  coagulase negative staphylococci, S. aureus,  methicillin resistant S. aureus, Streptococcus agalactiae  (Group B), S. pneumoniae, S. pyogenes (Group A),  Acinetobacter baumannii, Enterobacter cloacae, E. coli,  Klebsiella oxytoca, K. pneumoniae, Proteus sp.,  Serratia marcescens, Haemophilus influenzae,  Neisseria meningitidis, Pseudomonas aeruginosa, Candida  albicans, C. glabrata, C krusei, C parapsilosis,  C. tropicalis and the KPC resistance gene. ( @ 16:55)  Culture Results:   Growth in aerobic bottle: Gram Negative Rods ( @ 16:55)    Culture - Urine (20 @ 15:16)    Specimen Source: .Urine Clean Catch (Midstream)    Culture Results:   >100,000 CFU/ml Gram Negative Rods    RADIOLOGY:  <The imaging below has been reviewed and visualized by me independently. Findings as detailed in report below>    < from: CT Abdomen and Pelvis w/ IV Cont (20 @ 14:30) >  IMPRESSION:   Emphysematous cystitis.   Cirrhosis.  Nonobstructing left renal calculus.  < end of copied text >    < from: Xray Chest 1 View AP/PA (20 @ 10:48) >  Clear lungs  < end of copied text >

## 2020-02-13 NOTE — PHYSICAL THERAPY INITIAL EVALUATION ADULT - GAIT DEVIATIONS NOTED, PT EVAL
decreased nile/increased time in double stance/decreased step length/R lateral lean, +LOB requiring PT to correct/decreased velocity of limb motion/decreased stride length/decreased swing-to-stance ratio/decreased weight-shifting ability

## 2020-02-13 NOTE — PROGRESS NOTE ADULT - ATTENDING COMMENTS
64/M with PMH IDDM, dyslipidemia, obesity, GERD, HFpEF with mild LV diastolic dysfunction, decompensated JEAN cirrhosis c/b ascites, h/o SBP, h/o HE, recent admission for ESBL E. 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 Patient Presents with Septic Shock    CT A/P with Emphysematous cystitis and U/A with pyuria  Blood Cultures with E coli (susceptibility pending)  Urine Culture with ESBL E coli  Favor Urinary Source    Overall, Septic Shock, E coli bacteremia, Emphysematous Cystitis, Fever, Leukocytosis, Pre-Liver Transplant Evaluation, Encounter to Vaccinate Patient    Can switch Meropenem to Ertapenem (no organisms aside from E coli identified)  Follow up on repeat blood cultures  Follow up on susceptibilities of blood isolate  Patient not cleared for OLT at this time until etiology of infection determined and he stabilizes  Patient requires HBV and Pneumovax vaccination but defer until clinically stabilized    I will continue to follow. Please feel free to contact me with any further questions.    Eusebio Pérez M.D.  [facility] Division of Infectious Disease  8AM-5PM: Pager Number [phone]  After Hours (or if no response): Please contact the Infectious Diseases Office at [phone]     The above assessment and plan were discussed with medicine NP, Dr Letitia Mo (Hepatology Attending)

## 2020-02-13 NOTE — PROGRESS NOTE ADULT - ASSESSMENT
Impression:     1. Septic shock 2/2 Ecoli bacteremia, from urinary source. Urine culture now growing GNR  2. Decompensated JEAN Cirrhosis, UNOS listed. Current MELD Na 29 increased from baseline of 25 in the setting of sepsis.   -encephalopathy: mental status at baseline, patient is on lactulose and rifaximin at home.   -varices: last EGD showed small varices and GAVE on 12/30  -ascites: trace on CT/exam. History of SBP per chart but not found in [place]. On lasix/aldactone 40/100 at home. No Abx PPX  -HCC not seen on most recent CT.     3. DM    Recommendation:  -agree w broad spectrum abx, follow up ID recs  -continue lactulose/xifaxan  -monitor BMs, titrate Lactulose to ensure 3-4 BMs/day  -continue with home midodrine 20mg TID   -continue albumin  -oral fluid restriction to 1.5L given hyponatremia  -hold diuretics for now in the setting of sepsis Impression:     1. Septic shock 2/2 Ecoli bacteremia, from urinary source. Urine culture now growing GNR  2. Decompensated JEAN Cirrhosis, UNOS listed. Current MELD Na 29 increased from baseline of 25 in the setting of sepsis.   -encephalopathy: mental status at baseline, patient is on lactulose and rifaximin at home.   -varices: last EGD showed small varices and GAVE on 12/30  -ascites: trace on CT/exam. History of SBP per chart but not found in [place]. On lasix/aldactone 40/100 at home. No Abx PPX  -HCC not seen on most recent CT.     3. DM    Recommendation:  -agree w broad spectrum abx, follow up ID recs  -continue lactulose/xifaxan  -monitor BMs, titrate Lactulose to ensure 3-4 BMs/day  -continue with home midodrine 20mg TID   -can discontinue albumin  -oral fluid restriction to 1.5L given hyponatremia  -continue current diuretics

## 2020-02-13 NOTE — PROGRESS NOTE ADULT - SUBJECTIVE AND OBJECTIVE BOX
[facility] Division of Hospital Medicine  Eric Hernandez DO  Pager (KERRY-F, 0V-0Q): [phone]  Other Times:  [phone]    Patient is a 64y old  Male who presents with a chief complaint of bacteremia (2020 14:30)      SUBJECTIVE / OVERNIGHT EVENTS:    patient seen and examined at bedside.  feels well. + dysuria. no fevers. chills or abdominal pain    MEDICATIONS  (STANDING):  albumin human 25% IVPB 100 milliLiter(s) IV Intermittent every 6 hours  dextrose 5%. 1000 milliLiter(s) (50 mL/Hr) IV Continuous <Continuous>  dextrose 50% Injectable 12.5 Gram(s) IV Push once  dextrose 50% Injectable 25 Gram(s) IV Push once  dextrose 50% Injectable 25 Gram(s) IV Push once  enoxaparin Injectable 40 milliGRAM(s) SubCutaneous daily  folic acid 1 milliGRAM(s) Oral daily  insulin glargine Injectable (LANTUS) 12 Unit(s) SubCutaneous at bedtime  insulin lispro (HumaLOG) corrective regimen sliding scale   SubCutaneous three times a day before meals  insulin lispro (HumaLOG) corrective regimen sliding scale   SubCutaneous at bedtime  lactulose Syrup 20 Gram(s) Oral three times a day  meropenem  IVPB 1000 milliGRAM(s) IV Intermittent every 8 hours  midodrine 20 milliGRAM(s) Oral every 8 hours  multivitamin 1 Tablet(s) Oral daily  pantoprazole    Tablet 40 milliGRAM(s) Oral two times a day  rifAXIMin 550 milliGRAM(s) Oral two times a day    MEDICATIONS  (PRN):  dextrose 40% Gel 15 Gram(s) Oral once PRN Blood Glucose LESS THAN 70 milliGRAM(s)/deciliter  glucagon  Injectable 1 milliGRAM(s) IntraMuscular once PRN Glucose LESS THAN 70 milligrams/deciliter      CAPILLARY BLOOD GLUCOSE      POCT Blood Glucose.: 144 mg/dL (2020 08:20)  POCT Blood Glucose.: 319 mg/dL (2020 22:19)  POCT Blood Glucose.: 246 mg/dL (2020 17:17)  POCT Blood Glucose.: 176 mg/dL (2020 12:24)    I&O's Summary    2020 07:01  -  2020 07:00  --------------------------------------------------------  IN: 990 mL / OUT: 625 mL / NET: 365 mL    2020 07:01  -  2020 11:30  --------------------------------------------------------  IN: 0 mL / OUT: 1000 mL / NET: -1000 mL        PHYSICAL EXAM:  Vital Signs Last 24 Hrs  T(C): 36.9 (2020 06:12), Max: 37.1 (2020 20:26)  T(F): 98.4 (2020 06:12), Max: 98.7 (2020 20:26)  HR: 68 (2020 06:12) (68 - 92)  BP: 121/65 (2020 06:12) (100/58 - 136/69)  BP(mean): 74 (2020 13:00) (74 - 75)  RR: 18 (2020 06:12) (10 - 20)  SpO2: 95% (2020 06:12) (95% - 98%)    PHYSICAL EXAM:  GENERAL: NAD, well-developed  EYES: EOMI, PERRL, icteric sclera   NECK: Supple, No JVD  CHEST/LUNG: Clear to auscultation bilaterally; No wheeze  HEART: Regular rate and rhythm; No murmurs, rubs, or gallops  ABDOMEN: Soft, Nontender, Nondistended; Bowel sounds present. No fluid wave appreciated.   EXTREMITIES:  trace edema b/l. Chronic venous changes. No clubbing note.d   PSYCH: AAOx3  SKIN: jaundice+    LABS:                        7.5    19.04 )-----------( 70       ( 2020 04:58 )             22.5     02-12    132<L>  |  98  |  24<H>  ----------------------------<  218<H>  4.1   |  23  |  1.10    Ca    9.1      2020 04:58  Phos  2.7     02-12  Mg     1.6     02-12    TPro  5.1<L>  /  Alb  2.1<L>  /  TBili  8.6<H>  /  DBili  x   /  AST  21  /  ALT  11  /  AlkPhos  97  02-12    PT/INR - ( 2020 04:58 )   PT: 29.5 sec;   INR: 2.51 ratio         PTT - ( 2020 11:53 )  PTT:35.4 sec      Urinalysis Basic - ( 2020 11:32 )    Color: Orange / Appearance: Turbid / S.018 / pH: x  Gluc: x / Ketone: Negative  / Bili: Small / Urobili: 3 mg/dL   Blood: x / Protein: 100 / Nitrite: Negative   Leuk Esterase: Large / RBC: 24 /hpf /  /HPF   Sq Epi: x / Non Sq Epi: 0 /hpf / Bacteria: Many        Culture - Blood (collected 2020 16:55)  Source: .Blood Blood-Peripheral  Gram Stain (2020 08:40):    Growth in aerobic bottle: Gram Negative Rods  Preliminary Report (2020 09:29):    Growth in aerobic bottle: Escherichia coli    See previous culture [id number]    Culture - Blood (collected 2020 16:55)  Source: .Blood Blood-Peripheral  Gram Stain (2020 05:49):    Growth in aerobic and anaerobic bottles:    Gram Negative Rods  Preliminary Report (2020 09:15):    Growth in aerobic and anaerobic bottles: Escherichia coli    ***Blood Panel PCR results on this specimen are available    approximately 3 hours after the Gram stain result.***    Gram stain, PCR, and/or culture results may not always    correspond due to difference in methodologies.    ************************************************************    This PCR assay was performed using ImmuVen.    The following targets are tested for: Enterococcus,    vancomycin resistant enterococci, Listeria monocytogenes,    coagulase negative staphylococci, S. aureus,    methicillin resistant S. aureus, Streptococcus agalactiae    (Group B), S. pneumoniae, S. pyogenes (Group A),    Acinetobacter baumannii, Enterobacter cloacae, E. coli,    Klebsiella oxytoca, K. pneumoniae, Proteus sp.,    Serratia marcescens, Haemophilus influenzae,    Neisseria meningitidis, Pseudomonas aeruginosa, Candida    albicans, C. glabrata, C krusei, C parapsilosis,    C. tropicalis and the KPC resistance gene.  Organism: Blood Culture PCR (2020 08:16)  Organism: Blood Culture PCR (2020 08:16)    Culture - Urine (collected 2020 15:16)  Source: .Urine Clean Catch (Midstream)  Preliminary Report (2020 15:29):    >100,000 CFU/ml Gram Negative Rods        RADIOLOGY & ADDITIONAL TESTS:  Results Reviewed:   Imaging Personally Reviewed:  Electrocardiogram Personally Reviewed:    COORDINATION OF CARE:  Care Discussed with Consultants/Other Providers [Y/N]:  Prior or Outpatient Records Reviewed [Y/N]:  NP Yamileth

## 2020-02-13 NOTE — PROGRESS NOTE ADULT - ASSESSMENT
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 [place] to be related to ACD. Also with MGUS  -- hgb adequate  -- transfuse for hgb <7  -- monitor CBC    MGUS -- outpt f/u    thrombocytopenia -- chronic and stable, likely due to cirrhosis  -- can be exacerbated but infection and abx  -- monitor CBC  -- transfuse for plts <10 if no bleeding, <50 if with bleeding      Juan Carlos Rudd MD  [facility]  Cell: [phone]

## 2020-02-13 NOTE — PROGRESS NOTE ADULT - SUBJECTIVE AND OBJECTIVE BOX
Chief Complaint:  Patient is a 64y old  Male who presents with a chief complaint of bacteremia (2020 14:30)      Interval Events: Urine cultures grew GNR.   ROS: All 12 point system except listed above were otherwise negative.    Allergies:  codeine (Anaphylaxis)        Hospital Medications:  albumin human 25% IVPB 50 milliLiter(s) IV Intermittent every 6 hours  dextrose 40% Gel 15 Gram(s) Oral once PRN  dextrose 5%. 1000 milliLiter(s) IV Continuous <Continuous>  dextrose 50% Injectable 12.5 Gram(s) IV Push once  dextrose 50% Injectable 25 Gram(s) IV Push once  dextrose 50% Injectable 25 Gram(s) IV Push once  enoxaparin Injectable 40 milliGRAM(s) SubCutaneous daily  folic acid 1 milliGRAM(s) Oral daily  furosemide    Tablet 40 milliGRAM(s) Oral <User Schedule>  glucagon  Injectable 1 milliGRAM(s) IntraMuscular once PRN  insulin glargine Injectable (LANTUS) 12 Unit(s) SubCutaneous at bedtime  insulin lispro (HumaLOG) corrective regimen sliding scale   SubCutaneous three times a day before meals  insulin lispro (HumaLOG) corrective regimen sliding scale   SubCutaneous at bedtime  lactulose Syrup 20 Gram(s) Oral three times a day  meropenem  IVPB 1000 milliGRAM(s) IV Intermittent every 8 hours  midodrine 20 milliGRAM(s) Oral every 8 hours  multivitamin 1 Tablet(s) Oral daily  pantoprazole    Tablet 40 milliGRAM(s) Oral two times a day  rifAXIMin 550 milliGRAM(s) Oral two times a day  spironolactone 25 milliGRAM(s) Oral daily      PMHX/PSHX:  GIB (gastrointestinal bleeding)  GERD with esophagitis  Hepatic encephalopathy  Obesity  Fatty liver disease, nonalcoholic  Renal stones  Hypertension  Neuropathy  Hypercholesteremia  Diabetes  S/P cholecystectomy  No significant past surgical history      Family history:  Family history of type 2 diabetes mellitus  Family history of hypertension  Family history of stomach cancer  No pertinent family history in first degree relatives    There is no family history of peptic ulcer disease, gastric cancer, colon polyps, colon cancer, celiac disease, biliary, hepatic, or pancreatic disease.  None of the female relatives have breast, uterine, or ovarian cancer.     ROS:     General:  No wt loss, fevers, chills, night sweats, fatigue,   Eyes:  Good vision, no reported pain  ENT:  No sore throat, pain, runny nose, dysphagia  CV:  No pain, palpitations, hypo/hypertension  Resp:  No dyspnea, cough, tachypnea, wheezing  GI:  See HPI  :  No pain, bleeding, incontinence, nocturia  Muscle:  No pain, weakness  Neuro:  No weakness, tingling, memory problems  Psych:  No fatigue, insomnia, mood problems, depression  Endocrine:  No polyuria, polydipsia, cold/heat intolerance  Heme:  No petechiae, ecchymosis, easy bruisability  Skin:  No rash, edema      PHYSICAL EXAM:   Vital Signs:  Vital Signs Last 24 Hrs  T(C): 36.9 (2020 06:12), Max: 37.1 (2020 20:26)  T(F): 98.4 (2020 06:12), Max: 98.7 (2020 20:26)  HR: 68 (2020 06:12) (68 - 93)  BP: 121/65 (2020 06:12) (100/58 - 136/69)  BP(mean): 74 (2020 13:00) (74 - 89)  RR: 18 (2020 06:12) (10 - 32)  SpO2: 95% (2020 06:12) (95% - 99%)  Daily     Daily Weight in k.4 (2020 10:09)    PHYSICAL EXAM:     GENERAL:  Appears stated age, well-groomed, well-nourished, no distress  HEENT:  NC/AT,  conjunctivae clear and pink,  no JVD, + Icterus  CHEST:  Full & symmetric excursion, no increased effort, breath sounds clear  HEART:  Regular rhythm, S1, S2, no murmur/rub/S3/S4, no abdominal bruit, no edema  ABDOMEN:  Soft, non-tender, +mildly distended, normoactive bowel sounds,  no masses  EXTREMITIES:  no cyanosis,clubbing or edema  SKIN:  No rash/erythema/ecchymoses/petechiae/wounds/abscess/warm/dry  NEURO:  Alert, oriented x4, no asterixis    LABS:                        7.5    19.04 )-----------( 70       ( 2020 04:58 )             22.5       02-12    132<L>  |  98  |  24<H>  ----------------------------<  218<H>  4.1   |  23  |  1.10    Ca    9.1      2020 04:58  Phos  2.7     02-12  Mg     1.6     02-12    TPro  5.1<L>  /  Alb  2.1<L>  /  TBili  8.6<H>  /  DBili  x   /  AST  21  /  ALT  11  /  AlkPhos  97  02-12    LIVER FUNCTIONS - ( 2020 04:58 )  Alb: 2.1 g/dL / Pro: 5.1 g/dL / ALK PHOS: 97 U/L / ALT: 11 U/L / AST: 21 U/L / GGT: x           PT/INR - ( 2020 04:58 )   PT: 29.5 sec;   INR: 2.51 ratio         PTT - ( 2020 11:53 )  PTT:35.4 sec  Urinalysis Basic - ( 2020 11:32 )    Color: Orange / Appearance: Turbid / S.018 / pH: x  Gluc: x / Ketone: Negative  / Bili: Small / Urobili: 3 mg/dL   Blood: x / Protein: 100 / Nitrite: Negative   Leuk Esterase: Large / RBC: 24 /hpf /  /HPF   Sq Epi: x / Non Sq Epi: 0 /hpf / Bacteria: Many                              7.5    19.04 )-----------( 70       ( 2020 04:58 )             22.5                         9.8    22.59 )-----------( 107      ( 2020 11:10 )             29.6     Imaging: Chief Complaint:  Patient is a 64y old  Male who presents with a chief complaint of bacteremia (2020 14:30)      Interval Events: Urine cultures grew GNR. Pt denies nausea, vomiting or abdominal pain but does endorse increased urinary frequency and dysuria.   ROS: All 12 point system except listed above were otherwise negative.    Allergies:  codeine (Anaphylaxis)        Hospital Medications:  albumin human 25% IVPB 50 milliLiter(s) IV Intermittent every 6 hours  dextrose 40% Gel 15 Gram(s) Oral once PRN  dextrose 5%. 1000 milliLiter(s) IV Continuous <Continuous>  dextrose 50% Injectable 12.5 Gram(s) IV Push once  dextrose 50% Injectable 25 Gram(s) IV Push once  dextrose 50% Injectable 25 Gram(s) IV Push once  enoxaparin Injectable 40 milliGRAM(s) SubCutaneous daily  folic acid 1 milliGRAM(s) Oral daily  furosemide    Tablet 40 milliGRAM(s) Oral <User Schedule>  glucagon  Injectable 1 milliGRAM(s) IntraMuscular once PRN  insulin glargine Injectable (LANTUS) 12 Unit(s) SubCutaneous at bedtime  insulin lispro (HumaLOG) corrective regimen sliding scale   SubCutaneous three times a day before meals  insulin lispro (HumaLOG) corrective regimen sliding scale   SubCutaneous at bedtime  lactulose Syrup 20 Gram(s) Oral three times a day  meropenem  IVPB 1000 milliGRAM(s) IV Intermittent every 8 hours  midodrine 20 milliGRAM(s) Oral every 8 hours  multivitamin 1 Tablet(s) Oral daily  pantoprazole    Tablet 40 milliGRAM(s) Oral two times a day  rifAXIMin 550 milliGRAM(s) Oral two times a day  spironolactone 25 milliGRAM(s) Oral daily      PMHX/PSHX:  GIB (gastrointestinal bleeding)  GERD with esophagitis  Hepatic encephalopathy  Obesity  Fatty liver disease, nonalcoholic  Renal stones  Hypertension  Neuropathy  Hypercholesteremia  Diabetes  S/P cholecystectomy  No significant past surgical history      Family history:  Family history of type 2 diabetes mellitus  Family history of hypertension  Family history of stomach cancer  No pertinent family history in first degree relatives    There is no family history of peptic ulcer disease, gastric cancer, colon polyps, colon cancer, celiac disease, biliary, hepatic, or pancreatic disease.  None of the female relatives have breast, uterine, or ovarian cancer.     ROS:     General:  No wt loss, fevers, chills, night sweats, fatigue,   Eyes:  Good vision, no reported pain  ENT:  No sore throat, pain, runny nose, dysphagia  CV:  No pain, palpitations, hypo/hypertension  Resp:  No dyspnea, cough, tachypnea, wheezing  GI:  See HPI  :  No pain, bleeding, incontinence, nocturia  Muscle:  No pain, weakness  Neuro:  No weakness, tingling, memory problems  Psych:  No fatigue, insomnia, mood problems, depression  Endocrine:  No polyuria, polydipsia, cold/heat intolerance  Heme:  No petechiae, ecchymosis, easy bruisability  Skin:  No rash, edema      PHYSICAL EXAM:   Vital Signs:  Vital Signs Last 24 Hrs  T(C): 36.9 (2020 06:12), Max: 37.1 (2020 20:26)  T(F): 98.4 (2020 06:12), Max: 98.7 (2020 20:26)  HR: 68 (2020 06:12) (68 - 93)  BP: 121/65 (2020 06:12) (100/58 - 136/69)  BP(mean): 74 (2020 13:00) (74 - 89)  RR: 18 (2020 06:12) (10 - 32)  SpO2: 95% (2020 06:12) (95% - 99%)  Daily     Daily Weight in k.4 (2020 10:09)    PHYSICAL EXAM:     GENERAL:  Appears stated age, well-groomed, well-nourished, no distress  HEENT:  NC/AT,  conjunctivae clear and pink,  no JVD, + Icterus  CHEST:  Full & symmetric excursion, no increased effort, breath sounds clear  HEART:  Regular rhythm, S1, S2, no murmur/rub/S3/S4, no abdominal bruit, no edema  ABDOMEN:  Soft, non-tender, +mildly distended, normoactive bowel sounds,  no masses  EXTREMITIES:  no cyanosis,clubbing or edema  SKIN:  No rash/erythema/ecchymoses/petechiae/wounds/abscess/warm/dry  NEURO:  Alert, oriented x4, no asterixis    LABS:                        7.5    19.04 )-----------( 70       ( 2020 04:58 )             22.5       02-12    132<L>  |  98  |  24<H>  ----------------------------<  218<H>  4.1   |  23  |  1.10    Ca    9.1      2020 04:58  Phos  2.7     02-12  Mg     1.6     02-12    TPro  5.1<L>  /  Alb  2.1<L>  /  TBili  8.6<H>  /  DBili  x   /  AST  21  /  ALT  11  /  AlkPhos  97  02-12    LIVER FUNCTIONS - ( 2020 04:58 )  Alb: 2.1 g/dL / Pro: 5.1 g/dL / ALK PHOS: 97 U/L / ALT: 11 U/L / AST: 21 U/L / GGT: x           PT/INR - ( 2020 04:58 )   PT: 29.5 sec;   INR: 2.51 ratio         PTT - ( 2020 11:53 )  PTT:35.4 sec  Urinalysis Basic - ( 2020 11:32 )    Color: Orange / Appearance: Turbid / S.018 / pH: x  Gluc: x / Ketone: Negative  / Bili: Small / Urobili: 3 mg/dL   Blood: x / Protein: 100 / Nitrite: Negative   Leuk Esterase: Large / RBC: 24 /hpf /  /HPF   Sq Epi: x / Non Sq Epi: 0 /hpf / Bacteria: Many                              7.5    19.04 )-----------( 70       ( 2020 04:58 )             22.5                         9.8    22.59 )-----------( 107      ( 2020 11:10 )             29.6     Imaging: Chief Complaint:  Patient is a 64y old  Male who presents with a chief complaint of bacteremia (2020 14:30)      Interval Events: Urine cultures grew GNR. Pt denies nausea, vomiting or abdominal pain but does endorse increased urinary frequency and dysuria.   ROS: All 12 point system except listed above were otherwise negative.    Allergies:  codeine (Anaphylaxis)        Hospital Medications:  albumin human 25% IVPB 50 milliLiter(s) IV Intermittent every 6 hours  dextrose 40% Gel 15 Gram(s) Oral once PRN  dextrose 5%. 1000 milliLiter(s) IV Continuous <Continuous>  dextrose 50% Injectable 12.5 Gram(s) IV Push once  dextrose 50% Injectable 25 Gram(s) IV Push once  dextrose 50% Injectable 25 Gram(s) IV Push once  enoxaparin Injectable 40 milliGRAM(s) SubCutaneous daily  folic acid 1 milliGRAM(s) Oral daily  furosemide    Tablet 40 milliGRAM(s) Oral <User Schedule>  glucagon  Injectable 1 milliGRAM(s) IntraMuscular once PRN  insulin glargine Injectable (LANTUS) 12 Unit(s) SubCutaneous at bedtime  insulin lispro (HumaLOG) corrective regimen sliding scale   SubCutaneous three times a day before meals  insulin lispro (HumaLOG) corrective regimen sliding scale   SubCutaneous at bedtime  lactulose Syrup 20 Gram(s) Oral three times a day  meropenem  IVPB 1000 milliGRAM(s) IV Intermittent every 8 hours  midodrine 20 milliGRAM(s) Oral every 8 hours  multivitamin 1 Tablet(s) Oral daily  pantoprazole    Tablet 40 milliGRAM(s) Oral two times a day  rifAXIMin 550 milliGRAM(s) Oral two times a day  spironolactone 25 milliGRAM(s) Oral daily      PMHX/PSHX:  GIB (gastrointestinal bleeding)  GERD with esophagitis  Hepatic encephalopathy  Obesity  Fatty liver disease, nonalcoholic  Renal stones  Hypertension  Neuropathy  Hypercholesteremia  Diabetes  S/P cholecystectomy  No significant past surgical history      Family history:  Family history of type 2 diabetes mellitus  Family history of hypertension  Family history of stomach cancer  No pertinent family history in first degree relatives    There is no family history of peptic ulcer disease, gastric cancer, colon polyps, colon cancer, celiac disease, biliary, hepatic, or pancreatic disease.  None of the female relatives have breast, uterine, or ovarian cancer.     ROS:     General:  No wt loss, fevers, chills, night sweats, fatigue,   Eyes:  Good vision, no reported pain  ENT:  No sore throat, pain, runny nose, dysphagia  CV:  No pain, palpitations, hypo/hypertension  Resp:  No dyspnea, cough, tachypnea, wheezing  GI:  See HPI  :  No pain, bleeding, incontinence, nocturia  Muscle:  No pain, weakness  Neuro:  No weakness, tingling, memory problems  Psych:  No fatigue, insomnia, mood problems, depression  Endocrine:  No polyuria, polydipsia, cold/heat intolerance  Heme:  No petechiae, ecchymosis, easy bruisability  Skin:  No rash, edema      PHYSICAL EXAM:   Vital Signs:  Vital Signs Last 24 Hrs  T(C): 36.9 (2020 06:12), Max: 37.1 (2020 20:26)  T(F): 98.4 (2020 06:12), Max: 98.7 (2020 20:26)  HR: 68 (2020 06:12) (68 - 93)  BP: 121/65 (2020 06:12) (100/58 - 136/69)  BP(mean): 74 (2020 13:00) (74 - 89)  RR: 18 (2020 06:12) (10 - 32)  SpO2: 95% (2020 06:12) (95% - 99%)  Daily     Daily Weight in k.4 (2020 10:09)    PHYSICAL EXAM:     GENERAL:  Appears stated age, well-groomed, well-nourished, no distress  HEENT:  NC/AT,  conjunctivae clear and pink,  no JVD, + Icterus  CHEST:  Full & symmetric excursion, no increased effort, breath sounds clear  HEART:  Regular rhythm, S1, S2, no murmur/rub/S3/S4, no abdominal bruit, no edema  ABDOMEN:  Soft, non-tender, +mildly distended, normoactive bowel sounds,  no masses  EXTREMITIES:  no cyanosis,clubbing, no edema  SKIN:  No rash/erythema/ecchymoses/petechiae/wounds/abscess/warm/dry  NEURO:  Alert, oriented x4, no asterixis    LABS:                        7.5    19.04 )-----------( 70       ( 2020 04:58 )             22.5       02-12    132<L>  |  98  |  24<H>  ----------------------------<  218<H>  4.1   |  23  |  1.10    Ca    9.1      2020 04:58  Phos  2.7     02-12  Mg     1.6     02-12    TPro  5.1<L>  /  Alb  2.1<L>  /  TBili  8.6<H>  /  DBili  x   /  AST  21  /  ALT  11  /  AlkPhos  97  02-12    LIVER FUNCTIONS - ( 2020 04:58 )  Alb: 2.1 g/dL / Pro: 5.1 g/dL / ALK PHOS: 97 U/L / ALT: 11 U/L / AST: 21 U/L / GGT: x           PT/INR - ( 2020 04:58 )   PT: 29.5 sec;   INR: 2.51 ratio         PTT - ( 2020 11:53 )  PTT:35.4 sec  Urinalysis Basic - ( 2020 11:32 )    Color: Orange / Appearance: Turbid / S.018 / pH: x  Gluc: x / Ketone: Negative  / Bili: Small / Urobili: 3 mg/dL   Blood: x / Protein: 100 / Nitrite: Negative   Leuk Esterase: Large / RBC: 24 /hpf /  /HPF   Sq Epi: x / Non Sq Epi: 0 /hpf / Bacteria: Many                              7.5    19.04 )-----------( 70       ( 2020 04:58 )             22.5                         9.8    22.59 )-----------( 107      ( 2020 11:10 )             29.6     Imaging:

## 2020-02-13 NOTE — PHYSICAL THERAPY INITIAL EVALUATION ADULT - PRECAUTIONS/LIMITATIONS, REHAB EVAL
fall precautions/who is UNOS listed for liver transplantation at [facility] now pw confusion, weakness, and fall at home. Pt has felt worsening weakness and lethargy for past two days. At 7AM of the day of admission, pt sustained a fall at home out of bed. He pressed his life alert button and EMS brought him to the ED. In the ED Pt became hypotensive to 80s/50s and was started on levophed and admitted to MICU for further management. A/w septic shock. U/A +pyuria, BCx grew Ecoli CT Chest/Abdomen 2/11: Emphysematous cystitis. Cirrhosis. Nonobstructing left renal calculus. Pt weaned off pressors and transferred to floor on 2/12

## 2020-02-13 NOTE — PROGRESS NOTE ADULT - PROBLEM SELECTOR PLAN 3
MELD=29  -2/2 to JEAN cirrhosis   -continue with lasix/spironolactone for ascites  - small ascites on US not sufficient for safe paracentesis  -encephalopathy: mental status at baseline, patient is on lactulose and rifaximin at home.   -varices: last EGD showed small varices and GAVE on 12/30  -ascites: trace on CT/exam. History of SBP per chart but not found in [place]. On lasix/aldactone 40/100 at home. No Abx PPX  -HCC not seen on most recent CT.

## 2020-02-13 NOTE — PHYSICAL THERAPY INITIAL EVALUATION ADULT - DISCHARGE DISPOSITION, PT EVAL
TBD pending further evaluation/ambulation rehabilitation facility/Subactue. If pt d/c home would require home PT, assist with all mobility, & DME: RW.

## 2020-02-13 NOTE — PROGRESS NOTE ADULT - ASSESSMENT
ASSESSMENT:  64/M with PMH IDDM, dyslipidemia, obesity, GERD, HFpEF with mild LV diastolic dysfunction, decompensated JEAN cirrhosis c/b ascites, h/o SBP, h/o HE, recent admission for ESBL E. 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  P/w confusion, weakness, and fall at home.  Febrile in ED (Tmax 103.3), tachycardic (110). Labs notable for leucocytosis 22,590, ammonia 67, lactate 5.8, BUN 19, Cr 1.24. CT imaging concerning for emphysematous cystitis  Started on Levophed, txf to MICU. ID consulted for abx recs  ==========  Septic shock likely 2/2 urinary source  - Blood cx showing E. coli, sensitivities awaited. Urine cx growing GNRs  - UA positive. Imaging revealed emphysematous cystitis. CXR clear, RVP negative.  - past cx have grown ESBL E. coli, Klebsiella pneumoniae and Strep agalactiae  - pt asked about antibiotic prophylaxis for recurrent UTIs. He was on Bactrim for SBP prophylaxis (last urine cx showed sensitivity to Bactrim). However, unsure of options for UTI prophylaxis, given that he now had a UTI while on bactrim    Pre-LVAD and Pre-transplant screening  HIV 4th Generation screen: Negative  Syphilis screen: Negative  EBV Serology: Old infection  Toxoplasma IgG: Negative  Measles IgG: Reactive  Mumps IgG: Reactive  Rubella IgG: Reactive  Varicella IgG: Reactive  HSV 1/2 IgG: HSV-1 IgG reactive; HSV-2 IgG non-reactive  CMV IgG: Non-reactive  MRSA screen: Pending  Hepatitis A IgG: Reactive  Hepatitis B sAg: non-reactive  Hepatitis B sAb: non-reactive  Hepatitis B c IgM Ab: non-reactive  Hepatitic C Ab: non-reactive  Quantiferon: negative    Vaccinations  - would require Hepatitis B  - would require Pneumovax    RECOMMENDATIONS:  1. Septic shock likely 2/2 urinary source  - continue Meropenem 1g IV q8h for now  - f/u admission blood cx, urine cx  - f/u repeat blood cx ordered for 2/13/2020  - will discuss about options for antimicrobial prophylaxis for recurrent UTIs, after culture results obtained  ----------------  2. Pre-transplant  - would check MRSA screen, after resolution of acute issues  - would recommend Hepatitis B and Pneumovax vaccine, after resolution of acute issues  - would defer clearance for transplant, pending resolution of acute issues    CHERI Bruno, MD  Fellow, Infectious Diseases  Pager: [phone]  After 5pm and on Weekends: Call [phone] ASSESSMENT:  64/M with PMH IDDM, dyslipidemia, obesity, GERD, HFpEF with mild LV diastolic dysfunction, decompensated JEAN cirrhosis c/b ascites, h/o SBP, h/o HE, recent admission for ESBL E. 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  P/w confusion, weakness, and fall at home.  Febrile in ED (Tmax 103.3), tachycardic (110). Labs notable for leucocytosis 22,590, ammonia 67, lactate 5.8, BUN 19, Cr 1.24. CT imaging concerning for emphysematous cystitis  Started on Levophed, txf to MICU. ID consulted for abx recs  ==========  Septic shock likely 2/2 urinary source  - Blood cx and urine cx showing ESBL E. coli. UA positive. Imaging revealed emphysematous cystitis. CXR clear, RVP negative.  - past cx have grown ESBL E. coli, Klebsiella pneumoniae and Strep agalactiae  - pt asked about antibiotic prophylaxis for recurrent UTIs. He was on Bactrim for SBP prophylaxis (last urine cx showed sensitivity to Bactrim). However, unsure of options for UTI prophylaxis, given that he now had a UTI while on bactrim    Pre-LVAD and Pre-transplant screening  HIV 4th Generation screen: Negative  Syphilis screen: Negative  EBV Serology: Old infection  Toxoplasma IgG: Negative  Measles IgG: Reactive  Mumps IgG: Reactive  Rubella IgG: Reactive  Varicella IgG: Reactive  HSV 1/2 IgG: HSV-1 IgG reactive; HSV-2 IgG non-reactive  CMV IgG: Non-reactive  MRSA screen: Pending  Hepatitis A IgG: Reactive  Hepatitis B sAg: non-reactive  Hepatitis B sAb: non-reactive  Hepatitis B c IgM Ab: non-reactive  Hepatitic C Ab: non-reactive  Quantiferon: negative    Vaccinations  - would require Hepatitis B  - would require Pneumovax    RECOMMENDATIONS:  1. Septic shock likely 2/2 urinary source  - can discontinue Meropenem and start Ertapenem 1g/d IV  - f/u repeat blood cx ordered for 2/13/2020  - will discuss about options for antimicrobial prophylaxis for recurrent UTIs, after culture results obtained  ----------------  2. Pre-transplant  - would check MRSA screen, after resolution of acute issues  - would recommend Hepatitis B and Pneumovax vaccine, after resolution of acute issues  - would defer clearance for transplant, pending resolution of acute issues    CHERI Bruno MD  Fellow, Infectious Diseases  Pager: [phone]  After 5pm and on Weekends: Call [phone]

## 2020-02-13 NOTE — PHYSICAL THERAPY INITIAL EVALUATION ADULT - PERTINENT HX OF CURRENT PROBLEM, REHAB EVAL
63 yo M with IDDM, dyslipidemia, obesity, GERD, HFpEF with mild LV diastolic dysfunction, and decompensated JENA cirrhosis complicated by ascites, history of SBP, hepatic encephalopathy, and chronic anemia with a history of duodenal ulcer as well as GAVE and duodenal AVM s/p APC (last on 10/11/19), SEE BELOW

## 2020-02-14 ENCOUNTER — TRANSCRIPTION ENCOUNTER (OUTPATIENT)
Age: 65
End: 2020-02-14

## 2020-02-14 LAB
-  AMIKACIN: SIGNIFICANT CHANGE UP
-  AMPICILLIN/SULBACTAM: SIGNIFICANT CHANGE UP
-  AMPICILLIN: SIGNIFICANT CHANGE UP
-  AZTREONAM: SIGNIFICANT CHANGE UP
-  CEFAZOLIN: SIGNIFICANT CHANGE UP
-  CEFEPIME: SIGNIFICANT CHANGE UP
-  CEFOXITIN: SIGNIFICANT CHANGE UP
-  CEFTRIAXONE: SIGNIFICANT CHANGE UP
-  CIPROFLOXACIN: SIGNIFICANT CHANGE UP
-  ERTAPENEM: SIGNIFICANT CHANGE UP
-  GENTAMICIN: SIGNIFICANT CHANGE UP
-  IMIPENEM: SIGNIFICANT CHANGE UP
-  LEVOFLOXACIN: SIGNIFICANT CHANGE UP
-  MEROPENEM: SIGNIFICANT CHANGE UP
-  PIPERACILLIN/TAZOBACTAM: SIGNIFICANT CHANGE UP
-  TOBRAMYCIN: SIGNIFICANT CHANGE UP
-  TRIMETHOPRIM/SULFAMETHOXAZOLE: SIGNIFICANT CHANGE UP
ALBUMIN SERPL ELPH-MCNC: 2.9 G/DL — LOW (ref 3.3–5)
ALP SERPL-CCNC: 107 U/L — SIGNIFICANT CHANGE UP (ref 40–120)
ALT FLD-CCNC: 11 U/L — SIGNIFICANT CHANGE UP (ref 10–45)
ANION GAP SERPL CALC-SCNC: 8 MMOL/L — SIGNIFICANT CHANGE UP (ref 5–17)
AST SERPL-CCNC: 19 U/L — SIGNIFICANT CHANGE UP (ref 10–40)
BILIRUB SERPL-MCNC: 6.3 MG/DL — HIGH (ref 0.2–1.2)
BUN SERPL-MCNC: 17 MG/DL — SIGNIFICANT CHANGE UP (ref 7–23)
CALCIUM SERPL-MCNC: 9.5 MG/DL — SIGNIFICANT CHANGE UP (ref 8.4–10.5)
CHLORIDE SERPL-SCNC: 99 MMOL/L — SIGNIFICANT CHANGE UP (ref 96–108)
CO2 SERPL-SCNC: 26 MMOL/L — SIGNIFICANT CHANGE UP (ref 22–31)
CREAT SERPL-MCNC: 0.96 MG/DL — SIGNIFICANT CHANGE UP (ref 0.5–1.3)
CULTURE RESULTS: SIGNIFICANT CHANGE UP
CULTURE RESULTS: SIGNIFICANT CHANGE UP
GLUCOSE BLDC GLUCOMTR-MCNC: 128 MG/DL — HIGH (ref 70–99)
GLUCOSE BLDC GLUCOMTR-MCNC: 131 MG/DL — HIGH (ref 70–99)
GLUCOSE BLDC GLUCOMTR-MCNC: 179 MG/DL — HIGH (ref 70–99)
GLUCOSE BLDC GLUCOMTR-MCNC: 209 MG/DL — HIGH (ref 70–99)
GLUCOSE SERPL-MCNC: 115 MG/DL — HIGH (ref 70–99)
HCT VFR BLD CALC: 24.4 % — LOW (ref 39–50)
HGB BLD-MCNC: 8.1 G/DL — LOW (ref 13–17)
INR BLD: 2.2 RATIO — HIGH (ref 0.88–1.16)
MCHC RBC-ENTMCNC: 33.2 GM/DL — SIGNIFICANT CHANGE UP (ref 32–36)
MCHC RBC-ENTMCNC: 35.4 PG — HIGH (ref 27–34)
MCV RBC AUTO: 106.6 FL — HIGH (ref 80–100)
METHOD TYPE: SIGNIFICANT CHANGE UP
NRBC # BLD: 0 /100 WBCS — SIGNIFICANT CHANGE UP (ref 0–0)
ORGANISM # SPEC MICROSCOPIC CNT: SIGNIFICANT CHANGE UP
PLATELET # BLD AUTO: 83 K/UL — LOW (ref 150–400)
POTASSIUM SERPL-MCNC: 4 MMOL/L — SIGNIFICANT CHANGE UP (ref 3.5–5.3)
POTASSIUM SERPL-SCNC: 4 MMOL/L — SIGNIFICANT CHANGE UP (ref 3.5–5.3)
PROT SERPL-MCNC: 5.8 G/DL — LOW (ref 6–8.3)
PROTHROM AB SERPL-ACNC: 25.7 SEC — HIGH (ref 10–12.9)
RBC # BLD: 2.29 M/UL — LOW (ref 4.2–5.8)
RBC # FLD: 14.5 % — SIGNIFICANT CHANGE UP (ref 10.3–14.5)
SODIUM SERPL-SCNC: 133 MMOL/L — LOW (ref 135–145)
SPECIMEN SOURCE: SIGNIFICANT CHANGE UP
SPECIMEN SOURCE: SIGNIFICANT CHANGE UP
WBC # BLD: 8.6 K/UL — SIGNIFICANT CHANGE UP (ref 3.8–10.5)
WBC # FLD AUTO: 8.6 K/UL — SIGNIFICANT CHANGE UP (ref 3.8–10.5)

## 2020-02-14 PROCEDURE — 99232 SBSQ HOSP IP/OBS MODERATE 35: CPT | Mod: GC

## 2020-02-14 PROCEDURE — 99233 SBSQ HOSP IP/OBS HIGH 50: CPT

## 2020-02-14 PROCEDURE — 99232 SBSQ HOSP IP/OBS MODERATE 35: CPT

## 2020-02-14 RX ORDER — SPIRONOLACTONE 25 MG/1
25 TABLET, FILM COATED ORAL DAILY
Refills: 0 | Status: DISCONTINUED | OUTPATIENT
Start: 2020-02-14 | End: 2020-02-18

## 2020-02-14 RX ORDER — FUROSEMIDE 40 MG
40 TABLET ORAL DAILY
Refills: 0 | Status: DISCONTINUED | OUTPATIENT
Start: 2020-02-14 | End: 2020-02-18

## 2020-02-14 RX ADMIN — MIDODRINE HYDROCHLORIDE 20 MILLIGRAM(S): 2.5 TABLET ORAL at 15:21

## 2020-02-14 RX ADMIN — INSULIN GLARGINE 12 UNIT(S): 100 INJECTION, SOLUTION SUBCUTANEOUS at 22:05

## 2020-02-14 RX ADMIN — MIDODRINE HYDROCHLORIDE 20 MILLIGRAM(S): 2.5 TABLET ORAL at 05:44

## 2020-02-14 RX ADMIN — Medication 50 MILLILITER(S): at 05:42

## 2020-02-14 RX ADMIN — Medication 4 UNIT(S): at 17:45

## 2020-02-14 RX ADMIN — ENOXAPARIN SODIUM 40 MILLIGRAM(S): 100 INJECTION SUBCUTANEOUS at 12:19

## 2020-02-14 RX ADMIN — Medication 0: at 12:29

## 2020-02-14 RX ADMIN — LACTULOSE 20 GRAM(S): 10 SOLUTION ORAL at 05:44

## 2020-02-14 RX ADMIN — Medication 4 UNIT(S): at 08:44

## 2020-02-14 RX ADMIN — Medication 0: at 08:43

## 2020-02-14 RX ADMIN — LACTULOSE 20 GRAM(S): 10 SOLUTION ORAL at 22:05

## 2020-02-14 RX ADMIN — Medication 4 UNIT(S): at 12:30

## 2020-02-14 RX ADMIN — Medication 1 TABLET(S): at 12:19

## 2020-02-14 RX ADMIN — PANTOPRAZOLE SODIUM 40 MILLIGRAM(S): 20 TABLET, DELAYED RELEASE ORAL at 05:44

## 2020-02-14 RX ADMIN — PANTOPRAZOLE SODIUM 40 MILLIGRAM(S): 20 TABLET, DELAYED RELEASE ORAL at 17:48

## 2020-02-14 RX ADMIN — ERTAPENEM SODIUM 120 MILLIGRAM(S): 1 INJECTION, POWDER, LYOPHILIZED, FOR SOLUTION INTRAMUSCULAR; INTRAVENOUS at 17:42

## 2020-02-14 RX ADMIN — Medication 50 MILLILITER(S): at 00:14

## 2020-02-14 RX ADMIN — SPIRONOLACTONE 25 MILLIGRAM(S): 25 TABLET, FILM COATED ORAL at 15:21

## 2020-02-14 RX ADMIN — LACTULOSE 20 GRAM(S): 10 SOLUTION ORAL at 15:20

## 2020-02-14 RX ADMIN — Medication 40 MILLIGRAM(S): at 12:19

## 2020-02-14 RX ADMIN — Medication 2: at 17:45

## 2020-02-14 RX ADMIN — Medication 1 MILLIGRAM(S): at 12:19

## 2020-02-14 RX ADMIN — MIDODRINE HYDROCHLORIDE 20 MILLIGRAM(S): 2.5 TABLET ORAL at 22:06

## 2020-02-14 NOTE — PROGRESS NOTE ADULT - PROBLEM SELECTOR PLAN 1
septic shock 2/2 to emphysematous cystitis on CT with nonobstructive L renal calculus 8mm UCX 2/11 with  E coli  and  E.coli bacteremia, prior cultures ESBL   -switched to Invanz 2/13 --> BCX 2/12 and 2/13 NGTD  -plan for midline, ID follow up for duration pending sensitivities   -continue with midodrine 20mg q8hr  - d/c albumin 100mg q6hr

## 2020-02-14 NOTE — DISCHARGE NOTE PROVIDER - HOSPITAL COURSE
Pt is a 63 y/o M with PMHx of IDDM, dyslipidemia, obesity, GERD, HFpEF with mild LV diastolic dysfunction, and decompensated JEAN cirrhosis complicated by ascites, history of SBP, hepatic encephalopathy, and chronic anemia with a history of duodenal ulcer as well as GAVE and duodenal AVM s/p APC (last on 10/11/19), who is UNOS listed for liver transplantation at [facility] who presents with confusion, weakness, and fall at home found to be in septic shock 2/2 E. coli bacteremia. Seen by ID: CT A/P with Emphysematous cystitis and U/A with pyuria, blood Cultures with E coli, Urine Culture with ESBL E. Coli.             Overall, Septic Shock, E coli bacteremia, Emphysematous Cystitis, Fever, Leukocytosis, Pre-Liver Transplant Evaluation, Encounter to Vaccinate Patient        Continue Ertapenem 1g IV Q12H. Would treat 10 days from negative BCx. Anticipate Stop Date of 2/21/20    Follow up on repeat blood cultures    Will need PICC line    No absolute ID contraindication for OLT    Patient requires HBV and Pneumovax vaccination but defer until clinically stabilized                                                         Problem/Plan - 1:    ·  Problem: Bacteremia due to Escherichia coli.  Plan: septic shock 2/2 to emphysematous cystitis on CT with nonobstructive L renal calculus 8mm UCX 2/11 with  E coli    and  E.coli bacteremia, prior cultures ESBL     -switched to Invanz 2/13 --> BCX 2/12 and 2/13 NGTD    -plan for midline, ID follow up for duration pending sensitivities     -continue with midodrine 20mg q8hr    - d/c albumin 100mg q6hr.          Problem/Plan - 2:    ·  Problem: Hepatic encephalopathy.  Plan: resolved    -continue with lactulose 20mg tid 3-4 bowel movements and rifaximin.          Problem/Plan - 3:    ·  Problem: Decompensated hepatic cirrhosis.  Plan: MELD=29    -2/2 to JEAN cirrhosis     -continue with lasix 40mg daily /spironolactone 25mg daily for ascites    - small ascites on US not sufficient for safe paracentesis    -encephalopathy: mental status at baseline, patient is on lactulose and rifaximin at home.     -varices: last EGD showed small varices and GAVE on 12/30    -ascites: trace on CT/exam. History of SBP per chart but not found in [place]. On lasix/aldactone 40/100 at home. No Abx PPX    -HCC not seen on most recent CT.          Problem/Plan - 4:    ·  Problem: Type 2 diabetes mellitus without complication, with long-term current use of insulin.  Plan: c/w lantus 12uqhs    humalog 4u tid ac    aiss fs tid ac    A1c 6.4.          Problem/Plan - 5:    ·  Problem: MGUS (monoclonal gammopathy of unknown significance).  Plan: heme onc following    follow up plts.          Problem/Plan - 6: Pt is a 63 y/o M with PMHx of IDDM, dyslipidemia, obesity, GERD, HFpEF with mild LV diastolic dysfunction, and decompensated JEAN cirrhosis complicated by ascites, history of SBP, hepatic encephalopathy, and chronic anemia with a history of duodenal ulcer as well as GAVE and duodenal AVM s/p APC (last on 10/11/19), who is UNOS listed for liver transplantation at [facility] who presents with confusion, weakness, and fall at home found to be in septic shock 2/2 E. coli bacteremia. Seen by ID: CT A/P with Emphysematous cystitis and U/A with pyuria, blood Cultures with E coli, Urine Culture with ESBL E. Coli.     ID: IV Abx until 2/21/20 via Midline. Patient requires HBV and Pneumovax vaccination but defer until clinically stabilized. Pt is a 63 y/o M with PMHx of IDDM, dyslipidemia, obesity, GERD, HFpEF with mild LV diastolic dysfunction, and decompensated JEAN cirrhosis complicated by ascites, history of SBP, hepatic encephalopathy, and chronic anemia with a history of duodenal ulcer as well as GAVE and duodenal AVM s/p APC (last on 10/11/19), who is UNOS listed for liver transplantation at [facility] who presents with confusion, weakness, and fall at home found to be in septic shock 2/2 E. coli bacteremia. Seen by ID: CT A/P with Emphysematous cystitis and U/A with pyuria, blood cultures with E coli, Urine Culture with ESBL E. Coli.     ID: IV Abx until 2/21/20 via Midline. Patient requires HBV and Pneumovax vaccination but defer until clinically stabilized.     Pt cleared for DC by Dr. Hernandez with follow up with hepatology, transplant team, PCP and ID as OP. Pt is a 63 y/o M with PMHx of IDDM, dyslipidemia, obesity, GERD, HFpEF with mild LV diastolic dysfunction, and decompensated JEAN cirrhosis complicated by ascites, history of SBP, hepatic encephalopathy, and chronic anemia with a history of duodenal ulcer as well as GAVE and duodenal AVM s/p APC (last on 10/11/19), who is UNOS listed for liver transplantation at [facility] who presents with confusion, weakness, and fall at home found to be in septic shock 2/2 E. coli bacteremia. Seen by ID: CT A/P with Emphysematous cystitis and U/A with pyuria, blood cultures with E coli, Urine Culture with ESBL E. Coli.     ID: IV Abx until 2/21/20 via Midline. Patient requires HBV and Pneumovax vaccination, Pneumovax given her, Pt to follow up with ID in 2-3 weeks for Hep B vaccine prior to transplant.     Pt cleared for DC by Dr. Hernandez with follow up with hepatology, transplant team, PCP and ID as OP. Pt is a 65 y/o M with PMHx of IDDM, dyslipidemia, obesity, GERD, HFpEF with mild LV diastolic dysfunction, and decompensated JEAN cirrhosis complicated by ascites, history of SBP, hepatic encephalopathy, and chronic anemia with a history of duodenal ulcer as well as GAVE and duodenal AVM s/p APC (last on 10/11/19), who is UNOS listed for liver transplantation at [facility] who presents with confusion, weakness, and fall at home found to be in septic shock 2/2 E. coli bacteremia. Seen by ID: CT A/P with Emphysematous cystitis and U/A with pyuria, blood cultures with E coli, Urine Culture with ESBL E. Coli.     ID: IV Abx until 2/21/20 via Midline. Patient requires HBV and Pneumovax vaccination, Pneumovax given her, Pt to follow up with ID in 2-3 weeks for Hep B vaccine prior to transplant.     Pt cleared for DC by Dr. Hernandez with follow up with hepatology, transplant team, PCP and ID as OP.         Bacteremia due to Escherichia coli.  Plan: septic shock 2/2 to emphysematous cystitis on CT with nonobstructive L renal calculus 8mm UCX 2/11 with  E coli    and ESBL E.coli bacteremia    -switched to Invanz 2/13 --> BCX 2/12 and 2/13 NGTD    - will complete course on 2/21    -continue with midodrine 20mg q8hr.         Hepatic encephalopathy.  Plan: resolved    -continue with lactulose 20mg tid 3-4 bowel movements and rifaximin.         Decompensated hepatic cirrhosis.  Plan: MELD=29    -2/2 to JEAN cirrhosis     -continue with lasix 20mg daily /spironolactone 50mg daily for ascites    - small ascites on US not sufficient for safe paracentesis    -encephalopathy: mental status at baseline, patient is on lactulose and rifaximin at home.     -varices: last EGD showed small varices and GAVE on 12/30    -ascites: trace on CT/exam. History of SBP per chart but not found in [place]. No Abx PPX    -HCC not seen on most recent CT.         Type 2 diabetes mellitus without complication, with long-term current use of insulin.  Plan: c/w lantus 12uqhs    humalog 6u tid ac    aiss fs tid ac    A1c 6.4.         MGUS (monoclonal gammopathy of unknown significance).  Plan: heme onc following    follow up plts.

## 2020-02-14 NOTE — PROGRESS NOTE ADULT - SUBJECTIVE AND OBJECTIVE BOX
Follow Up: ESBL E. coli UTI, sepsis, pre-liver transplant    Interval History/ROS:Patient is a 64y old  Male who presents with a chief complaint of bacteremia (13 Feb 2020 17:34)  - no acute events reported overnight  - patient assessed at bedside. Reports improvement in dysuria, states he slept well    Allergies    codeine (Anaphylaxis)    Intolerances        ANTIMICROBIALS:  ertapenem  IVPB 1000 every 24 hours  rifAXIMin 550 two times a day      OTHER MEDS:  dextrose 40% Gel 15 Gram(s) Oral once PRN  dextrose 5%. 1000 milliLiter(s) IV Continuous <Continuous>  dextrose 50% Injectable 12.5 Gram(s) IV Push once  dextrose 50% Injectable 25 Gram(s) IV Push once  dextrose 50% Injectable 25 Gram(s) IV Push once  enoxaparin Injectable 40 milliGRAM(s) SubCutaneous daily  folic acid 1 milliGRAM(s) Oral daily  furosemide    Tablet 40 milliGRAM(s) Oral daily  glucagon  Injectable 1 milliGRAM(s) IntraMuscular once PRN  insulin glargine Injectable (LANTUS) 12 Unit(s) SubCutaneous at bedtime  insulin lispro (HumaLOG) corrective regimen sliding scale   SubCutaneous three times a day before meals  insulin lispro (HumaLOG) corrective regimen sliding scale   SubCutaneous at bedtime  insulin lispro Injectable (HumaLOG) 4 Unit(s) SubCutaneous three times a day before meals  lactulose Syrup 20 Gram(s) Oral three times a day  midodrine 20 milliGRAM(s) Oral every 8 hours  multivitamin 1 Tablet(s) Oral daily  pantoprazole    Tablet 40 milliGRAM(s) Oral two times a day  spironolactone 25 milliGRAM(s) Oral daily      Vital Signs Last 24 Hrs  T(C): 36.7 (14 Feb 2020 11:54), Max: 36.9 (13 Feb 2020 20:59)  T(F): 98 (14 Feb 2020 11:54), Max: 98.5 (13 Feb 2020 20:59)  HR: 74 (14 Feb 2020 11:54) (74 - 88)  BP: 127/65 (14 Feb 2020 11:54) (121/66 - 137/70)  BP(mean): --  RR: 18 (14 Feb 2020 11:54) (18 - 18)  SpO2: 96% (14 Feb 2020 11:54) (94% - 98%)    EXAM:  Constitutional: Not in acute distress  Eyes: No icterus. Pupils b/l reactive to light. Icterus noted  Oral cavity: Clear, no lesions  Neck: No neck vein distension noted  RS: Chest clear to auscultation bilaterally. No wheeze/rhonchi/crepitations.  CVS: S1, S2 heard. Regular rate and rhythm. No murmurs/rubs/gallops.  Abdomen: Soft. No guarding/rigidity/tenderness.  : No acute abnormalities  Extremities: Warm. No pedal edema  Skin: No lesions noted  Vascular: No evidence of phlebitis  Neuro: Alert, oriented to time/place/person                        8.1    8.60  )-----------( 83       ( 14 Feb 2020 07:04 )             24.4       02-14    133<L>  |  99  |  17  ----------------------------<  115<H>  4.0   |  26  |  0.96    Ca    9.5      14 Feb 2020 07:10    TPro  5.8<L>  /  Alb  2.9<L>  /  TBili  6.3<H>  /  DBili  x   /  AST  19  /  ALT  11  /  AlkPhos  107  02-14          MICROBIOLOGY:Culture Results:   No growth to date. (02-13 @ 09:44)  Culture Results:   No growth to date. (02-13 @ 09:44)  Culture Results:   No growth to date. (02-12 @ 22:11)  Culture Results:   No growth to date. (02-12 @ 22:11)  Culture Results:   Growth in aerobic and anaerobic bottles: Escherichia coli ESBL  ***Blood Panel PCR results on this specimen are available  approximately 3 hours after the Gram stain result.***  Gram stain, PCR, and/or culture results may not always  correspond dueto difference in methodologies.  ************************************************************  This PCR assay was performed using [employer].  The following targets are tested for: Enterococcus,  vancomycin resistant enterococci, Listeria monocytogenes,  coagulase negative staphylococci, S. aureus,  methicillin resistant S. aureus, Streptococcus agalactiae  (Group B), S. pneumoniae, S. pyogenes (Group A),  Acinetobacter baumannii, Enterobacter cloacae, E. coli,  Klebsiella oxytoca, K. pneumoniae, Proteus sp.,  Serratia marcescens, Haemophilus influenzae,  Neisseria meningitidis, Pseudomonas aeruginosa, Candida  albicans, C. glabrata, C krusei, C parapsilosis,  C. tropicalis and the KPC resistance gene. (02-11 @ 16:55)  Culture Results:   Growth in aerobic and anaerobic bottles: Escherichia coli ESBL  See previous culture [id number] (02-11 @ 16:55)  Culture Results:   >100,000 CFU/ml Escherichia coli ESBL (02-11 @ 15:16) Follow Up: ESBL E. coli UTI, sepsis, pre-liver transplant    Interval History/ROS:Patient is a 64y old  Male who presents with a chief complaint of bacteremia (13 Feb 2020 17:34)  - no acute events reported overnight  - patient assessed at bedside. Reports improvement in dysuria, states he slept well    Allergies    codeine (Anaphylaxis)    Intolerances        ANTIMICROBIALS:  ertapenem  IVPB 1000 every 24 hours  rifAXIMin 550 two times a day      OTHER MEDS:  dextrose 40% Gel 15 Gram(s) Oral once PRN  dextrose 5%. 1000 milliLiter(s) IV Continuous <Continuous>  dextrose 50% Injectable 12.5 Gram(s) IV Push once  dextrose 50% Injectable 25 Gram(s) IV Push once  dextrose 50% Injectable 25 Gram(s) IV Push once  enoxaparin Injectable 40 milliGRAM(s) SubCutaneous daily  folic acid 1 milliGRAM(s) Oral daily  furosemide    Tablet 40 milliGRAM(s) Oral daily  glucagon  Injectable 1 milliGRAM(s) IntraMuscular once PRN  insulin glargine Injectable (LANTUS) 12 Unit(s) SubCutaneous at bedtime  insulin lispro (HumaLOG) corrective regimen sliding scale   SubCutaneous three times a day before meals  insulin lispro (HumaLOG) corrective regimen sliding scale   SubCutaneous at bedtime  insulin lispro Injectable (HumaLOG) 4 Unit(s) SubCutaneous three times a day before meals  lactulose Syrup 20 Gram(s) Oral three times a day  midodrine 20 milliGRAM(s) Oral every 8 hours  multivitamin 1 Tablet(s) Oral daily  pantoprazole    Tablet 40 milliGRAM(s) Oral two times a day  spironolactone 25 milliGRAM(s) Oral daily      Vital Signs Last 24 Hrs  T(C): 36.7 (14 Feb 2020 11:54), Max: 36.9 (13 Feb 2020 20:59)  T(F): 98 (14 Feb 2020 11:54), Max: 98.5 (13 Feb 2020 20:59)  HR: 74 (14 Feb 2020 11:54) (74 - 88)  BP: 127/65 (14 Feb 2020 11:54) (121/66 - 137/70)  BP(mean): --  RR: 18 (14 Feb 2020 11:54) (18 - 18)  SpO2: 96% (14 Feb 2020 11:54) (94% - 98%)    EXAM:  Constitutional: Not in acute distress  Eyes: No icterus. Pupils b/l reactive to light. Icterus noted  Oral cavity: Clear, no lesions  Neck: No neck vein distension noted  RS: Chest clear to auscultation bilaterally. No wheeze/rhonchi/crepitations.  CVS: S1, S2 heard. Regular rate and rhythm. No murmurs/rubs/gallops.  Abdomen: Soft. No guarding/rigidity/tenderness.  : No acute abnormalities  Extremities: Warm. No pedal edema  Skin: No lesions noted  Vascular: No evidence of phlebitis  Neuro: Alert, oriented to time/place/person                        8.1    8.60  )-----------( 83       ( 14 Feb 2020 07:04 )             24.4       02-14    133<L>  |  99  |  17  ----------------------------<  115<H>  4.0   |  26  |  0.96    Ca    9.5      14 Feb 2020 07:10    TPro  5.8<L>  /  Alb  2.9<L>  /  TBili  6.3<H>  /  DBili  x   /  AST  19  /  ALT  11  /  AlkPhos  107  02-14          MICROBIOLOGY:Culture Results:   No growth to date. (02-13 @ 09:44)  Culture Results:   No growth to date. (02-13 @ 09:44)  Culture Results:   No growth to date. (02-12 @ 22:11)  Culture Results:   No growth to date. (02-12 @ 22:11)  Culture Results:   Growth in aerobic and anaerobic bottles: Escherichia coli ESBL  ***Blood Panel PCR results on this specimen are available  approximately 3 hours after the Gram stain result.***  Gram stain, PCR, and/or culture results may not always  correspond dueto difference in methodologies.  ************************************************************  This PCR assay was performed using [employer].  The following targets are tested for: Enterococcus,  vancomycin resistant enterococci, Listeria monocytogenes,  coagulase negative staphylococci, S. aureus,  methicillin resistant S. aureus, Streptococcus agalactiae  (Group B), S. pneumoniae, S. pyogenes (Group A),  Acinetobacter baumannii, Enterobacter cloacae, E. coli,  Klebsiella oxytoca, K. pneumoniae, Proteus sp.,  Serratia marcescens, Haemophilus influenzae,  Neisseria meningitidis, Pseudomonas aeruginosa, Candida  albicans, C. glabrata, C krusei, C parapsilosis,  C. tropicalis and the KPC resistance gene. (02-11 @ 16:55)  Culture Results:   Growth in aerobic and anaerobic bottles: Escherichia coli ESBL  See previous culture [id number] (02-11 @ 16:55)  Culture Results:   >100,000 CFU/ml Escherichia coli ESBL (02-11 @ 15:16)    RADIOLOGY:  <The imaging below has been reviewed and visualized by me independently. Findings as detailed in report below>    < from: CT Abdomen and Pelvis w/ IV Cont (02.11.20 @ 14:30) >  IMPRESSION:   Emphysematous cystitis.   Cirrhosis.  Nonobstructing left renal calculus.  < end of copied text >    < from: Xray Chest 1 View AP/PA (02.11.20 @ 10:48) >  Clear lungs  < end of copied text >

## 2020-02-14 NOTE — PROGRESS NOTE ADULT - ASSESSMENT
· Assessment		  65yo M with IDDM, dyslipidemia, obesity, GERD, HFpEF with mild LV diastolic dysfunction, and decompensated JEAN cirrhosis complicated by ascites, history of SBP, hepatic encephalopathy, and chronic anemia with a history of duodenal ulcer as well as GAVE and duodenal AVM s/p APC (last on 10/11/19), who is UNOS listed for liver transplantation at [facility] who presents with confusion, weakness, and fall at home, found to have sepsis and ESBL bacteremia    Bacteremia -- on abx  -- f/u ID    Anemia -- had extensive w/u in the past. [place] to be related to ACD. Also with MGUS  -- hgb adequate  -- transfuse for hgb <7  -- monitor CBC    MGUS -- outpt f/u    thrombocytopenia -- chronic and stable, likely due to cirrhosis  -- can be exacerbated but infection and abx  -- monitor CBC  -- transfuse for plts <10 if no bleeding, <50 if with bleeding      Jayro Peoples MD  [facility]  [phone]  cell: [phone]

## 2020-02-14 NOTE — PROGRESS NOTE ADULT - ATTENDING COMMENTS
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 coli bacteremia and hepatic encephalopathy.    # Septic shock: Resolved. Remains hemodynamically stable on home midodrine 20 mg po q8h.    # Emphysematous cystitis with E. coli bacteremia: S/p meropenem (2/11- ), with plan to switch to ertapenem to complete 10-day course of antibiotics from date of negative blood cultures (last date of antibiotics:  2/21). Needs PICC for home antibiotics.    # Hepatic encephalopathy: Resolved, now remains at his baseline mental status. Continue rifaximin and lactulose.    # Ascites with history of anasarca: Currently with minimal ascites and no peripheral edema. Continue furosemide 40 mg po daily and spironolactone 25 mg po daily. Continue sodium restricted diet.    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 Hb/HCT currently stable.    # Hypervolemic hyponatremia: Continue oral fluid restriction to <1.5L/day.    # Decompensated JEAN cirrhosis: Listed for liver transplant at [facility] with MELD-Na 29 (2/11/20), blood type A. Current MELD-Na 24 today.    Plan of care discussed with his hospitalist Dr. Eric Hernandez.  Please don't hesitate to call with any questions/concerns.    Letitia Mo M.D., Ph.D.  Transplant Hepatology  Cell: [phone]

## 2020-02-14 NOTE — DISCHARGE NOTE PROVIDER - PROVIDER TOKENS
PROVIDER:[TOKEN:[[id number]]],PROVIDER:[TOKEN:[[id number]]] PROVIDER:[TOKEN:[[id number]]],PROVIDER:[TOKEN:[[id number]]],PROVIDER:[TOKEN:[[id number]]],FREE:[LAST:[Dr. Coto],FIRST:[Dillan],PHONE:[phone]],FAX:[(   )    -],ADDRESS:[Primary Care Provider]]

## 2020-02-14 NOTE — DISCHARGE NOTE PROVIDER - CARE PROVIDERS DIRECT ADDRESSES
,[email or web address],DirectAddress_Unknown,DirectAddress_Unknown

## 2020-02-14 NOTE — PROGRESS NOTE ADULT - SUBJECTIVE AND OBJECTIVE BOX
Pt seen and examined  no fever or chills     MEDICATIONS  (STANDING):  albumin human 25% IVPB 100 milliLiter(s) IV Intermittent every 6 hours  dextrose 5%. 1000 milliLiter(s) (50 mL/Hr) IV Continuous <Continuous>  dextrose 50% Injectable 12.5 Gram(s) IV Push once  dextrose 50% Injectable 25 Gram(s) IV Push once  dextrose 50% Injectable 25 Gram(s) IV Push once  enoxaparin Injectable 40 milliGRAM(s) SubCutaneous daily  ertapenem  IVPB 1000 milliGRAM(s) IV Intermittent every 24 hours  folic acid 1 milliGRAM(s) Oral daily  insulin glargine Injectable (LANTUS) 12 Unit(s) SubCutaneous at bedtime  insulin lispro (HumaLOG) corrective regimen sliding scale   SubCutaneous three times a day before meals  insulin lispro (HumaLOG) corrective regimen sliding scale   SubCutaneous at bedtime  insulin lispro Injectable (HumaLOG) 4 Unit(s) SubCutaneous three times a day before meals  lactulose Syrup 20 Gram(s) Oral three times a day  midodrine 20 milliGRAM(s) Oral every 8 hours  multivitamin 1 Tablet(s) Oral daily  pantoprazole    Tablet 40 milliGRAM(s) Oral two times a day  rifAXIMin 550 milliGRAM(s) Oral two times a day    MEDICATIONS  (PRN):  dextrose 40% Gel 15 Gram(s) Oral once PRN Blood Glucose LESS THAN 70 milliGRAM(s)/deciliter  glucagon  Injectable 1 milliGRAM(s) IntraMuscular once PRN Glucose LESS THAN 70 milligrams/deciliter      ROS  No fever, sweats, chills  No epistaxis, HA, sore throat  No CP, SOB, cough, sputum  No n/v/d, abd pain, melena, hematochezia  No edema  No rash  No anxiety  No back pain, joint pain  No bleeding, bruising  No dysuria, hematuria    Vital Signs Last 24 Hrs  Vital Signs Last 24 Hrs  T(C): 36.9 (15 Feb 2020 05:35), Max: 37.1 (14 Feb 2020 20:53)  T(F): 98.4 (15 Feb 2020 05:35), Max: 98.8 (14 Feb 2020 20:53)  HR: 74 (15 Feb 2020 05:35) (74 - 82)  BP: 117/57 (15 Feb 2020 05:35) (105/62 - 131/64)  BP(mean): --  RR: 17 (15 Feb 2020 05:35) (17 - 18)  SpO2: 97% (15 Feb 2020 05:35) (96% - 97%)    PE  NAD  Awake, alert  Anicteric, MMM  RRR  CTAB  Abd soft, NT, ND  No c/c/e  No rash grossly  FROM                          8.1    8.60  )-----------( 83       ( 14 Feb 2020 07:04 )             24.4                           8.3    11.57 )-----------( 83       ( 13 Feb 2020 12:05 )             25.7       02-13    131<L>  |  98  |  22  ----------------------------<  193<H>  4.2   |  26  |  1.00    Ca    9.5      13 Feb 2020 12:05  Phos  2.7     02-12  Mg     1.6     02-12    TPro  6.0  /  Alb  2.5<L>  /  TBili  7.4<H>  /  DBili  x   /  AST  20  /  ALT  15  /  AlkPhos  130<H>  02-13

## 2020-02-14 NOTE — PROGRESS NOTE ADULT - ASSESSMENT
Impression:     1. Septic shock 2/2 Ecoli bacteremia, from urinary source. Urine culture now growing GNR, repeat blood cultures negative.   2. Decompensated JEAN Cirrhosis, UNOS listed. Current MELD Na 29 increased from baseline of 25 in the setting of sepsis.   -encephalopathy: mental status at baseline, patient is on lactulose and rifaximin at home.   -varices: last EGD showed small varices and GAVE on 12/30  -ascites: trace on CT/exam. History of SBP per chart but not found in [place]. On lasix/aldactone 40/100 at home. No Abx PPX  -HCC not seen on most recent CT.     3. DM    Recommendation:  -agree w broad spectrum abx, follow up ID recs  -continue lactulose/xifaxan  -monitor BMs, titrate Lactulose to ensure 3-4 BMs/day  -continue with home midodrine 20mg TID   -continue lasix 40mg PO daily and aldactone 25mg PO daily  -continue fluid restriction (1.5L) Impression:     1. Septic shock 2/2 Ecoli bacteremia, from urinary source. Urine culture now growing GNR, repeat blood cultures negative.   2. Decompensated JEAN Cirrhosis, UNOS listed. Current MELD Na 29 increased from baseline of 25 in the setting of sepsis.   -encephalopathy: mental status at baseline, patient is on lactulose and rifaximin at home.   -varices: last EGD showed small varices and GAVE on 12/30  -ascites: trace on CT/exam. History of SBP per chart but not found in [place]. On lasix/aldactone 40/100 at home. No Abx PPX  -HCC not seen on most recent CT.     3. DM    Recommendation:  -agree w carbepenem, follow up ID recs  -continue lactulose/xifaxan  -monitor BMs, titrate Lactulose to ensure 3-4 BMs/day  -continue with home midodrine 20mg TID   -continue lasix 40mg PO daily and aldactone 25mg PO daily  -continue fluid restriction (1.5L)

## 2020-02-14 NOTE — DISCHARGE NOTE PROVIDER - CARE PROVIDER_API CALL
Letitia Mo)  Gastroenterology; Internal Medicine  [address] [place] [address]  Phone: [phone]  Fax: [phone]  Follow Up Time:     Eusebio Pérez)  Internal Medicine  [address] [place] [address]  Phone: [phone]  Fax: [phone]  Follow Up Time: Letitia Mo)  Gastroenterology; Internal Medicine  [address] [place] [address]  Phone: [phone]  Fax: [phone]  Follow Up Time:     Eusebio Pérez)  Internal Medicine  [address] [place] [address]  Phone: [phone]  Fax: [phone]  Follow Up Time:     Silvana Hoover)  Hematology; Internal Medicine; Medical Oncology  [address]  [place] [address]  Phone: [phone]  Fax: [phone]  Follow Up Time:     Dillan Sheldon  Primary Care Provider  Phone: [phone]  Fax: (   )    -  Follow Up Time:

## 2020-02-14 NOTE — PROGRESS NOTE ADULT - SUBJECTIVE AND OBJECTIVE BOX
Chief Complaint:  Patient is a 64y old  Male who presents with a chief complaint of bacteremia (13 Feb 2020 17:34)      Interval Events: Liver enzymes are downtrending. Urine culture grew Ecoli ESBL. Repeat blood cultures negative. Pt had 3 BMs yesterday and today.   ROS: All 12 point system except listed above were otherwise negative.    Allergies:  codeine (Anaphylaxis)        Hospital Medications:  dextrose 40% Gel 15 Gram(s) Oral once PRN  dextrose 5%. 1000 milliLiter(s) IV Continuous <Continuous>  dextrose 50% Injectable 12.5 Gram(s) IV Push once  dextrose 50% Injectable 25 Gram(s) IV Push once  dextrose 50% Injectable 25 Gram(s) IV Push once  enoxaparin Injectable 40 milliGRAM(s) SubCutaneous daily  ertapenem  IVPB 1000 milliGRAM(s) IV Intermittent every 24 hours  folic acid 1 milliGRAM(s) Oral daily  glucagon  Injectable 1 milliGRAM(s) IntraMuscular once PRN  insulin glargine Injectable (LANTUS) 12 Unit(s) SubCutaneous at bedtime  insulin lispro (HumaLOG) corrective regimen sliding scale   SubCutaneous three times a day before meals  insulin lispro (HumaLOG) corrective regimen sliding scale   SubCutaneous at bedtime  insulin lispro Injectable (HumaLOG) 4 Unit(s) SubCutaneous three times a day before meals  lactulose Syrup 20 Gram(s) Oral three times a day  midodrine 20 milliGRAM(s) Oral every 8 hours  multivitamin 1 Tablet(s) Oral daily  pantoprazole    Tablet 40 milliGRAM(s) Oral two times a day  rifAXIMin 550 milliGRAM(s) Oral two times a day      PMHX/PSHX:  GIB (gastrointestinal bleeding)  GERD with esophagitis  Hepatic encephalopathy  Obesity  Fatty liver disease, nonalcoholic  Renal stones  Hypertension  Neuropathy  Hypercholesteremia  Diabetes  S/P cholecystectomy  No significant past surgical history      Family history:  Family history of type 2 diabetes mellitus  Family history of hypertension  Family history of stomach cancer  No pertinent family history in first degree relatives    There is no family history of peptic ulcer disease, gastric cancer, colon polyps, colon cancer, celiac disease, biliary, hepatic, or pancreatic disease.  None of the female relatives have breast, uterine, or ovarian cancer.     ROS:     General:  No wt loss, fevers, chills, night sweats, fatigue,   Eyes:  Good vision, no reported pain  ENT:  No sore throat, pain, runny nose, dysphagia  CV:  No pain, palpitations, hypo/hypertension  Resp:  No dyspnea, cough, tachypnea, wheezing  GI:  See HPI  :  No pain, bleeding, incontinence, nocturia  Muscle:  No pain, weakness  Neuro:  No weakness, tingling, memory problems  Psych:  No fatigue, insomnia, mood problems, depression  Endocrine:  No polyuria, polydipsia, cold/heat intolerance  Heme:  No petechiae, ecchymosis, easy bruisability  Skin:  No rash, edema      PHYSICAL EXAM:   Vital Signs:  Vital Signs Last 24 Hrs  T(C): 36.6 (14 Feb 2020 05:18), Max: 36.9 (13 Feb 2020 20:59)  T(F): 97.8 (14 Feb 2020 05:18), Max: 98.5 (13 Feb 2020 20:59)  HR: 77 (14 Feb 2020 05:18) (75 - 88)  BP: 137/70 (14 Feb 2020 05:18) (117/58 - 137/70)  BP(mean): --  RR: 18 (14 Feb 2020 05:18) (18 - 18)  SpO2: 96% (14 Feb 2020 05:18) (94% - 98%)  Daily     Daily     PHYSICAL EXAM:     GENERAL:  Appears stated age, well-groomed, well-nourished, no distress  HEENT:  NC/AT,  conjunctivae clear and pink,  no JVD, + Icterus  CHEST:  Full & symmetric excursion, no increased effort, breath sounds clear  HEART:  Regular rhythm, S1, S2, no murmur/rub/S3/S4, no abdominal bruit, no edema  ABDOMEN:  Soft, non-tender, +mildly distended, normoactive bowel sounds,  no masses  EXTREMITIES:  no cyanosis,clubbing, no edema  SKIN:  No rash/erythema/ecchymoses/petechiae/wounds/abscess/warm/dry  NEURO:  Alert, oriented x4, no asterixis      LABS:                        8.1    8.60  )-----------( 83       ( 14 Feb 2020 07:04 )             24.4     Mean Cell Volume: 106.6 fl (02-14-20 @ 07:04)    02-14    133<L>  |  99  |  17  ----------------------------<  115<H>  4.0   |  26  |  0.96    Ca    9.5      14 Feb 2020 07:10    TPro  5.8<L>  /  Alb  2.9<L>  /  TBili  6.3<H>  /  DBili  x   /  AST  19  /  ALT  11  /  AlkPhos  107  02-14    LIVER FUNCTIONS - ( 14 Feb 2020 07:10 )  Alb: 2.9 g/dL / Pro: 5.8 g/dL / ALK PHOS: 107 U/L / ALT: 11 U/L / AST: 19 U/L / GGT: x           PT/INR - ( 14 Feb 2020 07:21 )   PT: 25.7 sec;   INR: 2.20 ratio         PTT - ( 13 Feb 2020 12:05 )  PTT:40.1 sec                            8.1    8.60  )-----------( 83       ( 14 Feb 2020 07:04 )             24.4                         8.3    11.57 )-----------( 83       ( 13 Feb 2020 12:05 )             25.7                         7.5    19.04 )-----------( 70       ( 12 Feb 2020 04:58 )             22.5                         9.8    22.59 )-----------( 107      ( 11 Feb 2020 11:10 )             29.6     Imaging: Chief Complaint:  Patient is a 64y old  Male who presents with a chief complaint of bacteremia (13 Feb 2020 17:34)      Interval Events: Liver enzymes are downtrending. Urine culture grew Ecoli ESBL. Repeat blood cultures negative. Pt had 3 BMs yesterday and today. Pt states that he has decreased frequency but still w dysuria.   ROS: All 12 point system except listed above were otherwise negative.    Allergies:  codeine (Anaphylaxis)        Hospital Medications:  dextrose 40% Gel 15 Gram(s) Oral once PRN  dextrose 5%. 1000 milliLiter(s) IV Continuous <Continuous>  dextrose 50% Injectable 12.5 Gram(s) IV Push once  dextrose 50% Injectable 25 Gram(s) IV Push once  dextrose 50% Injectable 25 Gram(s) IV Push once  enoxaparin Injectable 40 milliGRAM(s) SubCutaneous daily  ertapenem  IVPB 1000 milliGRAM(s) IV Intermittent every 24 hours  folic acid 1 milliGRAM(s) Oral daily  glucagon  Injectable 1 milliGRAM(s) IntraMuscular once PRN  insulin glargine Injectable (LANTUS) 12 Unit(s) SubCutaneous at bedtime  insulin lispro (HumaLOG) corrective regimen sliding scale   SubCutaneous three times a day before meals  insulin lispro (HumaLOG) corrective regimen sliding scale   SubCutaneous at bedtime  insulin lispro Injectable (HumaLOG) 4 Unit(s) SubCutaneous three times a day before meals  lactulose Syrup 20 Gram(s) Oral three times a day  midodrine 20 milliGRAM(s) Oral every 8 hours  multivitamin 1 Tablet(s) Oral daily  pantoprazole    Tablet 40 milliGRAM(s) Oral two times a day  rifAXIMin 550 milliGRAM(s) Oral two times a day      PMHX/PSHX:  GIB (gastrointestinal bleeding)  GERD with esophagitis  Hepatic encephalopathy  Obesity  Fatty liver disease, nonalcoholic  Renal stones  Hypertension  Neuropathy  Hypercholesteremia  Diabetes  S/P cholecystectomy  No significant past surgical history      Family history:  Family history of type 2 diabetes mellitus  Family history of hypertension  Family history of stomach cancer  No pertinent family history in first degree relatives    There is no family history of peptic ulcer disease, gastric cancer, colon polyps, colon cancer, celiac disease, biliary, hepatic, or pancreatic disease.  None of the female relatives have breast, uterine, or ovarian cancer.     ROS:     General:  No wt loss, fevers, chills, night sweats, fatigue,   Eyes:  Good vision, no reported pain  ENT:  No sore throat, pain, runny nose, dysphagia  CV:  No pain, palpitations, hypo/hypertension  Resp:  No dyspnea, cough, tachypnea, wheezing  GI:  See HPI  :  No pain, bleeding, incontinence, nocturia  Muscle:  No pain, weakness  Neuro:  No weakness, tingling, memory problems  Psych:  No fatigue, insomnia, mood problems, depression  Endocrine:  No polyuria, polydipsia, cold/heat intolerance  Heme:  No petechiae, ecchymosis, easy bruisability  Skin:  No rash, edema      PHYSICAL EXAM:   Vital Signs:  Vital Signs Last 24 Hrs  T(C): 36.6 (14 Feb 2020 05:18), Max: 36.9 (13 Feb 2020 20:59)  T(F): 97.8 (14 Feb 2020 05:18), Max: 98.5 (13 Feb 2020 20:59)  HR: 77 (14 Feb 2020 05:18) (75 - 88)  BP: 137/70 (14 Feb 2020 05:18) (117/58 - 137/70)  BP(mean): --  RR: 18 (14 Feb 2020 05:18) (18 - 18)  SpO2: 96% (14 Feb 2020 05:18) (94% - 98%)  Daily     Daily     PHYSICAL EXAM:     GENERAL:  Appears stated age, well-groomed, well-nourished, no distress  HEENT:  NC/AT,  conjunctivae clear and pink,  no JVD, + Icterus  CHEST:  Full & symmetric excursion, no increased effort, breath sounds clear  HEART:  Regular rhythm, S1, S2, no murmur/rub/S3/S4, no abdominal bruit, no edema  ABDOMEN:  Soft, non-tender, +mildly distended, normoactive bowel sounds,  no masses  EXTREMITIES:  no cyanosis,clubbing, no edema  SKIN:  No rash/erythema/ecchymoses/petechiae/wounds/abscess/warm/dry  NEURO:  Alert, oriented x4, no asterixis      LABS:                        8.1    8.60  )-----------( 83       ( 14 Feb 2020 07:04 )             24.4     Mean Cell Volume: 106.6 fl (02-14-20 @ 07:04)    02-14    133<L>  |  99  |  17  ----------------------------<  115<H>  4.0   |  26  |  0.96    Ca    9.5      14 Feb 2020 07:10    TPro  5.8<L>  /  Alb  2.9<L>  /  TBili  6.3<H>  /  DBili  x   /  AST  19  /  ALT  11  /  AlkPhos  107  02-14    LIVER FUNCTIONS - ( 14 Feb 2020 07:10 )  Alb: 2.9 g/dL / Pro: 5.8 g/dL / ALK PHOS: 107 U/L / ALT: 11 U/L / AST: 19 U/L / GGT: x           PT/INR - ( 14 Feb 2020 07:21 )   PT: 25.7 sec;   INR: 2.20 ratio         PTT - ( 13 Feb 2020 12:05 )  PTT:40.1 sec                            8.1    8.60  )-----------( 83       ( 14 Feb 2020 07:04 )             24.4                         8.3    11.57 )-----------( 83       ( 13 Feb 2020 12:05 )             25.7                         7.5    19.04 )-----------( 70       ( 12 Feb 2020 04:58 )             22.5                         9.8    22.59 )-----------( 107      ( 11 Feb 2020 11:10 )             29.6     Imaging: Chief Complaint:  Patient is a 64y old  Male who presents with a chief complaint of bacteremia (13 Feb 2020 17:34)      Interval Events: Liver enzymes are downtrending. Urine culture grew Ecoli ESBL. Repeat blood cultures negative. Pt had 3 BMs yesterday and today. Pt states that he has decreased frequency but still w dysuria.   ROS: All 12 point system except listed above were otherwise negative.    Allergies:  codeine (Anaphylaxis)        Hospital Medications:  dextrose 40% Gel 15 Gram(s) Oral once PRN  dextrose 5%. 1000 milliLiter(s) IV Continuous <Continuous>  dextrose 50% Injectable 12.5 Gram(s) IV Push once  dextrose 50% Injectable 25 Gram(s) IV Push once  dextrose 50% Injectable 25 Gram(s) IV Push once  enoxaparin Injectable 40 milliGRAM(s) SubCutaneous daily  ertapenem  IVPB 1000 milliGRAM(s) IV Intermittent every 24 hours  folic acid 1 milliGRAM(s) Oral daily  glucagon  Injectable 1 milliGRAM(s) IntraMuscular once PRN  insulin glargine Injectable (LANTUS) 12 Unit(s) SubCutaneous at bedtime  insulin lispro (HumaLOG) corrective regimen sliding scale   SubCutaneous three times a day before meals  insulin lispro (HumaLOG) corrective regimen sliding scale   SubCutaneous at bedtime  insulin lispro Injectable (HumaLOG) 4 Unit(s) SubCutaneous three times a day before meals  lactulose Syrup 20 Gram(s) Oral three times a day  midodrine 20 milliGRAM(s) Oral every 8 hours  multivitamin 1 Tablet(s) Oral daily  pantoprazole    Tablet 40 milliGRAM(s) Oral two times a day  rifAXIMin 550 milliGRAM(s) Oral two times a day      PMHX/PSHX:  GIB (gastrointestinal bleeding)  GERD with esophagitis  Hepatic encephalopathy  Obesity  Fatty liver disease, nonalcoholic  Renal stones  Hypertension  Neuropathy  Hypercholesteremia  Diabetes  S/P cholecystectomy  No significant past surgical history      Family history:  Family history of type 2 diabetes mellitus  Family history of hypertension  Family history of stomach cancer  No pertinent family history in first degree relatives    There is no family history of peptic ulcer disease, gastric cancer, colon polyps, colon cancer, celiac disease, biliary, hepatic, or pancreatic disease.  None of the female relatives have breast, uterine, or ovarian cancer.     ROS:     General:  No wt loss, fevers, chills, night sweats, fatigue,   Eyes:  Good vision, no reported pain  ENT:  No sore throat, pain, runny nose, dysphagia  CV:  No pain, palpitations, hypo/hypertension  Resp:  No dyspnea, cough, tachypnea, wheezing  GI:  See HPI  :  No pain, bleeding, incontinence, nocturia  Muscle:  No pain, weakness  Neuro:  No weakness, tingling, memory problems  Psych:  No fatigue, insomnia, mood problems, depression  Endocrine:  No polyuria, polydipsia, cold/heat intolerance  Heme:  No petechiae, ecchymosis, easy bruisability  Skin:  No rash, edema      PHYSICAL EXAM:   Vital Signs:  Vital Signs Last 24 Hrs  T(C): 36.6 (14 Feb 2020 05:18), Max: 36.9 (13 Feb 2020 20:59)  T(F): 97.8 (14 Feb 2020 05:18), Max: 98.5 (13 Feb 2020 20:59)  HR: 77 (14 Feb 2020 05:18) (75 - 88)  BP: 137/70 (14 Feb 2020 05:18) (117/58 - 137/70)  BP(mean): --  RR: 18 (14 Feb 2020 05:18) (18 - 18)  SpO2: 96% (14 Feb 2020 05:18) (94% - 98%)  Daily     Daily     PHYSICAL EXAM:     GENERAL:  Appears stated age, well-groomed, well-nourished, no distress  HEENT:  NC/AT,  conjunctivae clear and pink,  no JVD, + Icterus  CHEST:  Full & symmetric excursion, no increased effort, breath sounds clear  HEART:  Regular rhythm, S1, S2, no murmur/rub/S3/S4, no abdominal bruit, no edema  ABDOMEN:  Soft, non-tender, non-distended, normoactive bowel sounds,  no masses  EXTREMITIES:  no cyanosis,clubbing, no edema  SKIN:  No rash/erythema/ecchymoses/petechiae/wounds/abscess/warm/dry  NEURO:  Alert, oriented x4, no asterixis      LABS:                        8.1    8.60  )-----------( 83       ( 14 Feb 2020 07:04 )             24.4     Mean Cell Volume: 106.6 fl (02-14-20 @ 07:04)    02-14    133<L>  |  99  |  17  ----------------------------<  115<H>  4.0   |  26  |  0.96    Ca    9.5      14 Feb 2020 07:10    TPro  5.8<L>  /  Alb  2.9<L>  /  TBili  6.3<H>  /  DBili  x   /  AST  19  /  ALT  11  /  AlkPhos  107  02-14    LIVER FUNCTIONS - ( 14 Feb 2020 07:10 )  Alb: 2.9 g/dL / Pro: 5.8 g/dL / ALK PHOS: 107 U/L / ALT: 11 U/L / AST: 19 U/L / GGT: x           PT/INR - ( 14 Feb 2020 07:21 )   PT: 25.7 sec;   INR: 2.20 ratio         PTT - ( 13 Feb 2020 12:05 )  PTT:40.1 sec                            8.1    8.60  )-----------( 83       ( 14 Feb 2020 07:04 )             24.4                         8.3    11.57 )-----------( 83       ( 13 Feb 2020 12:05 )             25.7                         7.5    19.04 )-----------( 70       ( 12 Feb 2020 04:58 )             22.5                         9.8    22.59 )-----------( 107      ( 11 Feb 2020 11:10 )             29.6     Imaging:

## 2020-02-14 NOTE — PROGRESS NOTE ADULT - ATTENDING COMMENTS
64/M with PMH IDDM, dyslipidemia, obesity, GERD, HFpEF with mild LV diastolic dysfunction, decompensated JEAN cirrhosis c/b ascites, h/o SBP, h/o HE, recent admission for ESBL E. 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 Patient Presents with Septic Shock    CT A/P with Emphysematous cystitis and U/A with pyuria  Blood Cultures with E coli (susceptibility pending)  Urine Culture with ESBL E coli  Favor Urinary Source    Overall, Septic Shock, E coli bacteremia, Emphysematous Cystitis, Fever, Leukocytosis, Pre-Liver Transplant Evaluation, Encounter to Vaccinate Patient    Continue Ertapenem 1g IV Q12H. Would treat 10 days from negative BCx. Ancitipate Stop Date of 2/21/20  Follow up on repeat blood cultures  Will need PICC line  No absolute ID contraindication for OLT  Patient requires HBV and Pneumovax vaccination but defer until clinically stabilized    I will continue to follow. Please feel free to contact me with any further questions.    Eusebio Pérez M.D.  [facility] Division of Infectious Disease  8AM-5PM: Pager Number [phone]  After Hours (or if no response): Please contact the Infectious Diseases Office at [phone] 64/M with PMH IDDM, dyslipidemia, obesity, GERD, HFpEF with mild LV diastolic dysfunction, decompensated JEAN cirrhosis c/b ascites, h/o SBP, h/o HE, recent admission for ESBL E. 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 Patient Presents with Septic Shock    CT A/P with Emphysematous cystitis and U/A with pyuria  Blood Cultures with E coli (susceptibility pending)  Urine Culture with ESBL E coli  Favor Urinary Source    Overall, Septic Shock, E coli bacteremia, Emphysematous Cystitis, Fever, Leukocytosis, Pre-Liver Transplant Evaluation, Encounter to Vaccinate Patient    Continue Ertapenem 1g IV Q12H. Would treat 10 days from negative BCx. Anticipate Stop Date of 2/21/20  Follow up on repeat blood cultures  Will need PICC line  No absolute ID contraindication for OLT  Patient requires HBV and Pneumovax vaccination but defer until clinically stabilized    I will continue to follow. Please feel free to contact me with any further questions.    Eusebio Pérez M.D.  [facility] Division of Infectious Disease  8AM-5PM: Pager Number [phone]  After Hours (or if no response): Please contact the Infectious Diseases Office at [phone]    The above assessment and plan were discussed with medicine NP 64/M with PMH IDDM, dyslipidemia, obesity, GERD, HFpEF with mild LV diastolic dysfunction, decompensated JEAN cirrhosis c/b ascites, h/o SBP, h/o HE, recent admission for ESBL E. 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 Patient Presents with Septic Shock    CT A/P with Emphysematous cystitis and U/A with pyuria  Blood Cultures with E coli (susceptibility pending)  Urine Culture with ESBL E coli  Favor Urinary Source    Overall, Septic Shock, E coli bacteremia, Emphysematous Cystitis, Fever, Leukocytosis, Pre-Liver Transplant Evaluation, Encounter to Vaccinate Patient    Continue Ertapenem 1g IV Q24H. Would treat 10 days from negative BCx. Anticipate Stop Date of 2/21/20  Follow up on repeat blood cultures  Will need midline line  No absolute ID contraindication for OLT  Patient requires HBV and Pneumovax vaccination but defer until clinically stabilized    I will continue to follow. Please feel free to contact me with any further questions.    Eusebio Pérez M.D.  [facility] Division of Infectious Disease  8AM-5PM: Pager Number [phone]  After Hours (or if no response): Please contact the Infectious Diseases Office at [phone]    The above assessment and plan were discussed with medicine NP

## 2020-02-14 NOTE — DISCHARGE NOTE PROVIDER - NSDCCPCAREPLAN_GEN_ALL_CORE_FT
PRINCIPAL DISCHARGE DIAGNOSIS  Diagnosis: Septic shock  Assessment and Plan of Treatment: Presented with confusion, weakness, and fall at home found to be in septic shock 2/2 E. coli bacteremia. Seen by ID: CT A/P with Emphysematous cystitis and U/A with pyuria, blood cultures with E coli, Urine Culture with ESBL E. Coli.   ID: IV Abx until 2/21/20 via Midline. Patient requires HBV and Pneumovax vaccination but defer until clinically stabilized.      SECONDARY DISCHARGE DIAGNOSES  Diagnosis: UTI (urinary tract infection)  Assessment and Plan of Treatment:     Diagnosis: Decompensated hepatic cirrhosis  Assessment and Plan of Treatment: Seen by transplant team and hepatology: continue with Lasix/spironolactone for ascites, Midodrine, lactulose, and rifaximin.   Small ascites on US not sufficient for safe paracentesis  Encephalopathy improved  Fluid restriction (1.5L)    Diagnosis: MGUS (monoclonal gammopathy of unknown significance)  Assessment and Plan of Treatment: Seen by Hem/Onc:   Anemia: had extensive w/u in the past. Shaniko to be related to ACD. Also with MGUS, hgb adequate.  MGUS: outpatinet work up.    Thrombocytopenia -- chronic and stable, likely due to cirrhosis, can be exacerbated by infection and Abx  Follow up as OP. PRINCIPAL DISCHARGE DIAGNOSIS  Diagnosis: Septic shock  Assessment and Plan of Treatment: Presented with confusion, weakness, and fall at home found to be in septic shock 2/2 E. coli bacteremia. Seen by ID: CT A/P with Emphysematous cystitis and U/A with pyuria, blood cultures with E coli, Urine Culture with ESBL E. Coli.   ID: IV Abx until 2/21/20 via Midline. Patient requires HBV and Pneumovax vaccination, Pneumovax given here, Pt to follow up with ID in 2-3 weeks for Hep B vaccine prior to transplant.   Pt cleared for DC with follow up with hepatology, transplant team, PCP and ID as OP.      SECONDARY DISCHARGE DIAGNOSES  Diagnosis: UTI (urinary tract infection)  Assessment and Plan of Treatment: Ann-Marieetly on IV Abx until 2/21/20    Diagnosis: Decompensated hepatic cirrhosis  Assessment and Plan of Treatment: Seen by transplant team and hepatology: continue with Lasix/spironolactone for ascites, Midodrine, lactulose, and rifaximin.   Small ascites on US not sufficient for safe paracentesis  Encephalopathy improved  Fluid restriction (1.5L)    Diagnosis: MGUS (monoclonal gammopathy of unknown significance)  Assessment and Plan of Treatment: Seen by Hem/Onc:   Anemia: had extensive w/u in the past. Rutledge to be related to ACD. Also with MGUS, hgb adequate.  MGUS: outpatinet work up.    Thrombocytopenia -- chronic and stable, likely due to cirrhosis, can be exacerbated by infection and Abx  Follow up as OP.

## 2020-02-14 NOTE — PROGRESS NOTE ADULT - ASSESSMENT
Pt is a 64M with IDDM, dyslipidemia, obesity, GERD, HFpEF with mild LV diastolic dysfunction, and decompensated JEAN cirrhosis complicated by ascites, history of SBP, hepatic encephalopathy, and chronic anemia with a history of duodenal ulcer as well as GAVE and duodenal AVM s/p APC (last on 10/11/19), who is UNOS listed for liver transplantation at [facility] who presents with confusion, weakness, and fall at home found to be in septic shock 2/2 e. coli bacteremia

## 2020-02-14 NOTE — PROGRESS NOTE ADULT - SUBJECTIVE AND OBJECTIVE BOX
[facility] Division of Hospital Medicine  Eric Hernandez DO  Pager (M-F, 7I-[phone]): [phone]  Other Times:  [phone]    Patient is a 64y old  Male who presents with a chief complaint of bacteremia (13 Feb 2020 17:34)      SUBJECTIVE / OVERNIGHT EVENTS:    patient seen and examined at bedside.  feels well no complaints. no fevers, chills, abdominal pain.    MEDICATIONS  (STANDING):  dextrose 5%. 1000 milliLiter(s) (50 mL/Hr) IV Continuous <Continuous>  dextrose 50% Injectable 12.5 Gram(s) IV Push once  dextrose 50% Injectable 25 Gram(s) IV Push once  dextrose 50% Injectable 25 Gram(s) IV Push once  enoxaparin Injectable 40 milliGRAM(s) SubCutaneous daily  ertapenem  IVPB 1000 milliGRAM(s) IV Intermittent every 24 hours  folic acid 1 milliGRAM(s) Oral daily  furosemide    Tablet 40 milliGRAM(s) Oral daily  insulin glargine Injectable (LANTUS) 12 Unit(s) SubCutaneous at bedtime  insulin lispro (HumaLOG) corrective regimen sliding scale   SubCutaneous three times a day before meals  insulin lispro (HumaLOG) corrective regimen sliding scale   SubCutaneous at bedtime  insulin lispro Injectable (HumaLOG) 4 Unit(s) SubCutaneous three times a day before meals  lactulose Syrup 20 Gram(s) Oral three times a day  midodrine 20 milliGRAM(s) Oral every 8 hours  multivitamin 1 Tablet(s) Oral daily  pantoprazole    Tablet 40 milliGRAM(s) Oral two times a day  rifAXIMin 550 milliGRAM(s) Oral two times a day  spironolactone 25 milliGRAM(s) Oral daily    MEDICATIONS  (PRN):  dextrose 40% Gel 15 Gram(s) Oral once PRN Blood Glucose LESS THAN 70 milliGRAM(s)/deciliter  glucagon  Injectable 1 milliGRAM(s) IntraMuscular once PRN Glucose LESS THAN 70 milligrams/deciliter      CAPILLARY BLOOD GLUCOSE      POCT Blood Glucose.: 131 mg/dL (14 Feb 2020 08:19)  POCT Blood Glucose.: 233 mg/dL (13 Feb 2020 21:34)  POCT Blood Glucose.: 252 mg/dL (13 Feb 2020 17:55)  POCT Blood Glucose.: 182 mg/dL (13 Feb 2020 12:41)    I&O's Summary    13 Feb 2020 07:01  -  14 Feb 2020 07:00  --------------------------------------------------------  IN: 1890 mL / OUT: 3500 mL / NET: -1610 mL    14 Feb 2020 07:01  -  14 Feb 2020 10:50  --------------------------------------------------------  IN: 0 mL / OUT: 500 mL / NET: -500 mL        PHYSICAL EXAM:  Vital Signs Last 24 Hrs  T(C): 36.6 (14 Feb 2020 05:18), Max: 36.9 (13 Feb 2020 20:59)  T(F): 97.8 (14 Feb 2020 05:18), Max: 98.5 (13 Feb 2020 20:59)  HR: 77 (14 Feb 2020 05:18) (75 - 88)  BP: 137/70 (14 Feb 2020 05:18) (117/58 - 137/70)  BP(mean): --  RR: 18 (14 Feb 2020 05:18) (18 - 18)  SpO2: 96% (14 Feb 2020 05:18) (94% - 98%)    PHYSICAL EXAM:  GENERAL: NAD, well-developed  EYES: EOMI, PERRL, icteric sclera   NECK: Supple, No JVD  CHEST/LUNG: Clear to auscultation bilaterally; No wheezes rales or rhonchi  HEART: s1 s2 Regular rate and rhythm; No murmurs, rubs  ABDOMEN: Soft, Nontender, Nondistended; Bowel sounds present. No fluid wave appreciated.   EXTREMITIES:  trace edema b/l. Chronic venous changes. No clubbing noted   PSYCH: AAOx3        LABS:                        8.1    8.60  )-----------( 83       ( 14 Feb 2020 07:04 )             24.4     02-14    133<L>  |  99  |  17  ----------------------------<  115<H>  4.0   |  26  |  0.96    Ca    9.5      14 Feb 2020 07:10    TPro  5.8<L>  /  Alb  2.9<L>  /  TBili  6.3<H>  /  DBili  x   /  AST  19  /  ALT  11  /  AlkPhos  107  02-14    PT/INR - ( 14 Feb 2020 07:21 )   PT: 25.7 sec;   INR: 2.20 ratio         PTT - ( 13 Feb 2020 12:05 )  PTT:40.1 sec          Culture - Blood (collected 13 Feb 2020 09:44)  Source: .Blood Blood-Venous  Preliminary Report (14 Feb 2020 10:02):    No growth to date.    Culture - Blood (collected 13 Feb 2020 09:44)  Source: .Blood Blood-Peripheral  Preliminary Report (14 Feb 2020 10:01):    No growth to date.    Culture - Blood (collected 12 Feb 2020 22:11)  Source: .Blood Blood  Preliminary Report (13 Feb 2020 23:01):    No growth to date.    Culture - Blood (collected 12 Feb 2020 22:11)  Source: .Blood Blood  Preliminary Report (13 Feb 2020 23:01):    No growth to date.    Culture - Blood (collected 11 Feb 2020 16:55)  Source: .Blood Blood-Peripheral  Gram Stain (13 Feb 2020 13:17):    Growth in aerobic bottle: Gram Negative Rods    Growth in anaerobic bottle: Gram Negative Rods  Preliminary Report (13 Feb 2020 13:18):    Growth in aerobic bottle: Escherichia coli    See previous culture [id number]    Growth in anaerobic bottle: Gram Negative Rods    Culture - Blood (collected 11 Feb 2020 16:55)  Source: .Blood Blood-Peripheral  Gram Stain (12 Feb 2020 05:49):    Growth in aerobic and anaerobic bottles:    Gram Negative Rods  Final Report (14 Feb 2020 10:17):    Growth in aerobic and anaerobic bottles: Escherichia coli ESBL    ***Blood Panel PCR results on this specimen are available    approximately 3 hours after the Gram stain result.***    Gram stain, PCR, and/or culture results may not always    correspond dueto difference in methodologies.    ************************************************************    This PCR assay was performed using [employer].    The following targets are tested for: Enterococcus,    vancomycin resistant enterococci, Listeria monocytogenes,    coagulase negative staphylococci, S. aureus,    methicillin resistant S. aureus, Streptococcus agalactiae    (Group B), S. pneumoniae, S. pyogenes (Group A),    Acinetobacter baumannii, Enterobacter cloacae, E. coli,    Klebsiella oxytoca, K. pneumoniae, Proteus sp.,    Serratia marcescens, Haemophilus influenzae,    Neisseria meningitidis, Pseudomonas aeruginosa, Candida    albicans, C. glabrata, C krusei, C parapsilosis,    C. tropicalis and the KPC resistance gene.  Organism: Blood Culture PCR  Escherichia coli ESBL (14 Feb 2020 10:17)  Organism: Escherichia coli ESBL (14 Feb 2020 10:17)  Organism: Blood Culture PCR (14 Feb 2020 10:17)    Culture - Urine (collected 11 Feb 2020 15:16)  Source: .Urine Clean Catch (Midstream)  Final Report (13 Feb 2020 13:57):    >100,000 CFU/ml Escherichia coli ESBL  Organism: Escherichia coli ESBL (13 Feb 2020 13:57)  Organism: Escherichia coli ESBL (13 Feb 2020 13:57)        RADIOLOGY & ADDITIONAL TESTS:  Results Reviewed:   Imaging Personally Reviewed:  Electrocardiogram Personally Reviewed:    COORDINATION OF CARE:  Care Discussed with Consultants/Other Providers [Y/N]:  Prior or Outpatient Records Reviewed [Y/N]:

## 2020-02-14 NOTE — DISCHARGE NOTE PROVIDER - NSDCMRMEDTOKEN_GEN_ALL_CORE_FT
ferrous sulfate: 325 milligram(s) orally once a day   folic acid 1 mg oral tablet: 1 tab(s) orally once a day  furosemide 40 mg oral tablet: 1 tab(s) orally every other day x 1 week  Then assess volume status and adjust accordingly  insulin glargine: 24 unit(s) subcutaneous once a day (at bedtime)  insulin lispro: 8 unit(s) subcutaneous 3 times a day (before meals)  lactulose 10 g/15 mL oral syrup: 30 milliliter(s) orally 3 times a day  midodrine 10 mg oral tablet: 2 tab(s) orally 3 times a day  pantoprazole 40 mg oral delayed release tablet: 1 tab(s) orally 2 times a day   rifAXIMin 550 mg oral tablet: 1 tab(s) orally 2 times a day  Sliding scale 3 times a day : 1 Unit(s) if Glucose 151 - 200  2 Unit(s) if Glucose 201 - 250  3 Unit(s) if Glucose 251 - 300  4 Unit(s) if Glucose 301 - 350  5 Unit(s) if Glucose 351 - 400  6 Unit(s) if Glucose Greater Than 400  Sliding Scale at bed time:   spironolactone 25 mg oral tablet: 2 tab(s) orally once a day  sulfamethoxazole-trimethoprim 800 mg-160 mg oral tablet: 1 tab(s) orally once  Last dose today (1/10/20) at 6PM  tamsulosin 0.4 mg oral capsule: 1 cap(s) orally once a day (at bedtime) ertapenem 1 g injection: 1000 milligram(s) intravenous every 24 hours until 2/21/20  ferrous sulfate: 325 milligram(s) orally once a day   folic acid 1 mg oral tablet: 1 tab(s) orally once a day  furosemide 40 mg oral tablet: 1 tab(s) orally every other day x 1 week  Then assess volume status and adjust accordingly  insulin glargine: 24 unit(s) subcutaneous once a day (at bedtime)  insulin lispro: 8 unit(s) subcutaneous 3 times a day (before meals)  lactulose 10 g/15 mL oral syrup: 30 milliliter(s) orally 3 times a day  midodrine 10 mg oral tablet: 2 tab(s) orally 3 times a day  pantoprazole 40 mg oral delayed release tablet: 1 tab(s) orally 2 times a day   rifAXIMin 550 mg oral tablet: 1 tab(s) orally 2 times a day  Sliding scale 3 times a day : 1 Unit(s) if Glucose 151 - 200  2 Unit(s) if Glucose 201 - 250  3 Unit(s) if Glucose 251 - 300  4 Unit(s) if Glucose 301 - 350  5 Unit(s) if Glucose 351 - 400  6 Unit(s) if Glucose Greater Than 400  Sliding Scale at bed time:   spironolactone 25 mg oral tablet: 2 tab(s) orally once a day  sulfamethoxazole-trimethoprim 800 mg-160 mg oral tablet: 1 tab(s) orally once  Last dose today (1/10/20) at 6PM  tamsulosin 0.4 mg oral capsule: 1 cap(s) orally once a day (at bedtime) ertapenem 1 g injection: 1000 milligram(s) intravenous every 24 hours until 2/21/20  folic acid 1 mg oral tablet: 1 tab(s) orally once a day  insulin glargine: 12 unit(s) subcutaneous once a day (at bedtime)  insulin lispro: 6 unit(s) subcutaneous 3 times a day (before meals)  Meropenem IV 1g q8 hrs until 2/21/20:   midodrine 10 mg oral tablet: 2 tab(s) orally 3 times a day  Multiple Vitamins oral tablet: 1 tab(s) orally once a day  pantoprazole 40 mg oral delayed release tablet: 1 tab(s) orally 2 times a day   rifAXIMin 550 mg oral tablet: 1 tab(s) orally 2 times a day  spironolactone 25 mg oral tablet: 1 tab(s) orally once a day  tamsulosin 0.4 mg oral capsule: 1 cap(s) orally once a day (at bedtime) folic acid 1 mg oral tablet: 1 tab(s) orally once a day  furosemide 20 mg oral tablet: 1 tab(s) orally once a day  insulin glargine: 12 unit(s) subcutaneous once a day (at bedtime)  insulin lispro: 6 unit(s) subcutaneous 3 times a day (before meals)  midodrine 10 mg oral tablet: 2 tab(s) orally 3 times a day  Multiple Vitamins oral tablet: 1 tab(s) orally once a day  pantoprazole 40 mg oral delayed release tablet: 1 tab(s) orally 2 times a day   rifAXIMin 550 mg oral tablet: 1 tab(s) orally 2 times a day  spironolactone 25 mg oral tablet: 2 tab(s) orally once a day  tamsulosin 0.4 mg oral capsule: 1 cap(s) orally once a day (at bedtime) folic acid 1 mg oral tablet: 1 tab(s) orally once a day  furosemide 20 mg oral tablet: 1 tab(s) orally every other day starting 2/23  every kasey tuesday and thrusday    furosemide 40 mg oral tablet: 1 tab(s) orally every other day   starting 2/22 saturday monday/wednesday/friday/saturday   insulin glargine: 12 unit(s) subcutaneous once a day (at bedtime)  insulin lispro: 6 unit(s) subcutaneous 3 times a day (before meals)  lactulose 10 g/15 mL oral syrup: 30 milliliter(s) orally 3 times a day  midodrine 10 mg oral tablet: 2 tab(s) orally 3 times a day  Multiple Vitamins oral tablet: 1 tab(s) orally once a day  pantoprazole 40 mg oral delayed release tablet: 1 tab(s) orally 2 times a day   rifAXIMin 550 mg oral tablet: 1 tab(s) orally 2 times a day  spironolactone 25 mg oral tablet: 2 tab(s) orally once a day  tamsulosin 0.4 mg oral capsule: 1 cap(s) orally once a day (at bedtime)

## 2020-02-14 NOTE — PROGRESS NOTE ADULT - PROBLEM SELECTOR PLAN 3
MELD=29  -2/2 to JEAN cirrhosis   -continue with lasix 40mg daily /spironolactone 25mg daily for ascites  - small ascites on US not sufficient for safe paracentesis  -encephalopathy: mental status at baseline, patient is on lactulose and rifaximin at home.   -varices: last EGD showed small varices and GAVE on 12/30  -ascites: trace on CT/exam. History of SBP per chart but not found in [place]. On lasix/aldactone 40/100 at home. No Abx PPX  -HCC not seen on most recent CT.

## 2020-02-14 NOTE — PROGRESS NOTE ADULT - ASSESSMENT
ASSESSMENT:  64/M with PMH IDDM, dyslipidemia, obesity, GERD, HFpEF with mild LV diastolic dysfunction, decompensated JEAN cirrhosis c/b ascites, h/o SBP, h/o HE, recent admission for ESBL E. 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  P/w confusion, weakness, and fall at home.  Febrile in ED (Tmax 103.3), tachycardic (110). Labs notable for leucocytosis 22,590, ammonia 67, lactate 5.8, BUN 19, Cr 1.24. CT imaging concerning for emphysematous cystitis  Started on Levophed, txf to MICU. ID consulted for abx recs  ==========  Septic shock likely 2/2 urinary source  - Blood cx and urine cx showing ESBL E. coli. UA positive. Imaging revealed emphysematous cystitis. CXR clear, RVP negative.  - past cx have grown ESBL E. coli, Klebsiella pneumoniae and Strep agalactiae  - pt asked about antibiotic prophylaxis for recurrent UTIs. On review of outpatient notes, it seems he was not on Bactrim.    Pre-LVAD and Pre-transplant screening  HIV 4th Generation screen: Negative  Syphilis screen: Negative  EBV Serology: Old infection  Toxoplasma IgG: Negative  Measles IgG: Reactive  Mumps IgG: Reactive  Rubella IgG: Reactive  Varicella IgG: Reactive  HSV 1/2 IgG: HSV-1 IgG reactive; HSV-2 IgG non-reactive  CMV IgG: Non-reactive  MRSA screen: Pending  Hepatitis A IgG: Reactive  Hepatitis B sAg: non-reactive  Hepatitis B sAb: non-reactive  Hepatitis B c IgM Ab: non-reactive  Hepatitic C Ab: non-reactive  Quantiferon: negative    Vaccinations  - would require Hepatitis B  - would require Pneumovax    RECOMMENDATIONS:  1. Septic shock likely 2/2 urinary source  - continue Ertapenem 1g/d IV  - repeat blood cx ordered for 2/13/2020  - anticipate 10 days of antibiotic therapy from first negative blood cx  ----------------  2. Pre-transplant  - would check MRSA screen, after resolution of acute issues  - would recommend Hepatitis B and Pneumovax vaccine, after resolution of acute issues  - would defer clearance for transplant, pending resolution of acute issues    CHERI Bruno, MD  Fellow, Infectious Diseases  Pager: [phone]  After 5pm and on Weekends: Call [phone] ASSESSMENT:  64/M with PMH IDDM, dyslipidemia, obesity, GERD, HFpEF with mild LV diastolic dysfunction, decompensated JEAN cirrhosis c/b ascites, h/o SBP, h/o HE, recent admission for ESBL E. 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  P/w confusion, weakness, and fall at home.  Febrile in ED (Tmax 103.3), tachycardic (110). Labs notable for leucocytosis 22,590, ammonia 67, lactate 5.8, BUN 19, Cr 1.24. CT imaging concerning for emphysematous cystitis  Started on Levophed, txf to MICU. ID consulted for abx recs  ==========  Septic shock likely 2/2 urinary source  - Blood cx and urine cx showing ESBL E. coli. UA positive. Imaging revealed emphysematous cystitis. CXR clear, RVP negative.  - past cx have grown ESBL E. coli, Klebsiella pneumoniae and Strep agalactiae  - pt asked about antibiotic prophylaxis for recurrent UTIs. On review of outpatient notes, it seems he was not on Bactrim.    Pre-LVAD and Pre-transplant screening  HIV 4th Generation screen: Negative  Syphilis screen: Negative  EBV Serology: Old infection  Toxoplasma IgG: Negative  Measles IgG: Reactive  Mumps IgG: Reactive  Rubella IgG: Reactive  Varicella IgG: Reactive  HSV 1/2 IgG: HSV-1 IgG reactive; HSV-2 IgG non-reactive  CMV IgG: Non-reactive  MRSA screen: Pending  Hepatitis A IgG: Reactive  Hepatitis B sAg: non-reactive  Hepatitis B sAb: non-reactive  Hepatitis B c IgM Ab: non-reactive  Hepatitic C Ab: non-reactive  Quantiferon: negative    Vaccinations  - would require Hepatitis B  - would require Pneumovax    RECOMMENDATIONS:  1. Septic shock likely 2/2 urinary source  - continue Ertapenem 1g/d IV  - anticipate 10 days of antibiotic therapy from first negative blood cx  ----------------  2. Pre-transplant  - would recommend Hepatitis B and Pneumovax vaccine, after resolution of acute issues  - would defer clearance for transplant, pending resolution of acute issues    CHERI Bruno MD  Fellow, Infectious Diseases  Pager: [phone]  After 5pm and on Weekends: Call [phone]

## 2020-02-15 LAB
ALBUMIN SERPL ELPH-MCNC: 2.8 G/DL — LOW (ref 3.3–5)
ALP SERPL-CCNC: 99 U/L — SIGNIFICANT CHANGE UP (ref 40–120)
ALT FLD-CCNC: 13 U/L — SIGNIFICANT CHANGE UP (ref 10–45)
ANION GAP SERPL CALC-SCNC: 9 MMOL/L — SIGNIFICANT CHANGE UP (ref 5–17)
APTT BLD: 48.6 SEC — HIGH (ref 27.5–36.3)
AST SERPL-CCNC: 22 U/L — SIGNIFICANT CHANGE UP (ref 10–40)
BILIRUB SERPL-MCNC: 6.7 MG/DL — HIGH (ref 0.2–1.2)
BUN SERPL-MCNC: 14 MG/DL — SIGNIFICANT CHANGE UP (ref 7–23)
CALCIUM SERPL-MCNC: 9.3 MG/DL — SIGNIFICANT CHANGE UP (ref 8.4–10.5)
CHLORIDE SERPL-SCNC: 97 MMOL/L — SIGNIFICANT CHANGE UP (ref 96–108)
CO2 SERPL-SCNC: 27 MMOL/L — SIGNIFICANT CHANGE UP (ref 22–31)
CREAT SERPL-MCNC: 0.95 MG/DL — SIGNIFICANT CHANGE UP (ref 0.5–1.3)
GLUCOSE BLDC GLUCOMTR-MCNC: 119 MG/DL — HIGH (ref 70–99)
GLUCOSE BLDC GLUCOMTR-MCNC: 159 MG/DL — HIGH (ref 70–99)
GLUCOSE BLDC GLUCOMTR-MCNC: 219 MG/DL — HIGH (ref 70–99)
GLUCOSE BLDC GLUCOMTR-MCNC: 223 MG/DL — HIGH (ref 70–99)
GLUCOSE SERPL-MCNC: 107 MG/DL — HIGH (ref 70–99)
HCT VFR BLD CALC: 26 % — LOW (ref 39–50)
HGB BLD-MCNC: 8.8 G/DL — LOW (ref 13–17)
INR BLD: 2.18 RATIO — HIGH (ref 0.88–1.16)
MCHC RBC-ENTMCNC: 33.8 GM/DL — SIGNIFICANT CHANGE UP (ref 32–36)
MCHC RBC-ENTMCNC: 35.6 PG — HIGH (ref 27–34)
MCV RBC AUTO: 105.3 FL — HIGH (ref 80–100)
NRBC # BLD: 0 /100 WBCS — SIGNIFICANT CHANGE UP (ref 0–0)
PLATELET # BLD AUTO: 93 K/UL — LOW (ref 150–400)
POTASSIUM SERPL-MCNC: 3.7 MMOL/L — SIGNIFICANT CHANGE UP (ref 3.5–5.3)
POTASSIUM SERPL-SCNC: 3.7 MMOL/L — SIGNIFICANT CHANGE UP (ref 3.5–5.3)
PROT SERPL-MCNC: 5.8 G/DL — LOW (ref 6–8.3)
PROTHROM AB SERPL-ACNC: 25.5 SEC — HIGH (ref 10–13.1)
RBC # BLD: 2.47 M/UL — LOW (ref 4.2–5.8)
RBC # FLD: 14.3 % — SIGNIFICANT CHANGE UP (ref 10.3–14.5)
SODIUM SERPL-SCNC: 133 MMOL/L — LOW (ref 135–145)
WBC # BLD: 9.36 K/UL — SIGNIFICANT CHANGE UP (ref 3.8–10.5)
WBC # FLD AUTO: 9.36 K/UL — SIGNIFICANT CHANGE UP (ref 3.8–10.5)

## 2020-02-15 PROCEDURE — 99233 SBSQ HOSP IP/OBS HIGH 50: CPT

## 2020-02-15 RX ADMIN — PANTOPRAZOLE SODIUM 40 MILLIGRAM(S): 20 TABLET, DELAYED RELEASE ORAL at 06:14

## 2020-02-15 RX ADMIN — Medication 1: at 12:46

## 2020-02-15 RX ADMIN — SPIRONOLACTONE 25 MILLIGRAM(S): 25 TABLET, FILM COATED ORAL at 06:14

## 2020-02-15 RX ADMIN — ENOXAPARIN SODIUM 40 MILLIGRAM(S): 100 INJECTION SUBCUTANEOUS at 12:03

## 2020-02-15 RX ADMIN — Medication 4 UNIT(S): at 12:45

## 2020-02-15 RX ADMIN — Medication 1 TABLET(S): at 12:04

## 2020-02-15 RX ADMIN — Medication 4 UNIT(S): at 17:39

## 2020-02-15 RX ADMIN — MIDODRINE HYDROCHLORIDE 20 MILLIGRAM(S): 2.5 TABLET ORAL at 14:36

## 2020-02-15 RX ADMIN — MIDODRINE HYDROCHLORIDE 20 MILLIGRAM(S): 2.5 TABLET ORAL at 21:20

## 2020-02-15 RX ADMIN — LACTULOSE 20 GRAM(S): 10 SOLUTION ORAL at 21:20

## 2020-02-15 RX ADMIN — PANTOPRAZOLE SODIUM 40 MILLIGRAM(S): 20 TABLET, DELAYED RELEASE ORAL at 17:39

## 2020-02-15 RX ADMIN — MIDODRINE HYDROCHLORIDE 20 MILLIGRAM(S): 2.5 TABLET ORAL at 06:14

## 2020-02-15 RX ADMIN — LACTULOSE 20 GRAM(S): 10 SOLUTION ORAL at 06:14

## 2020-02-15 RX ADMIN — Medication 4 UNIT(S): at 08:32

## 2020-02-15 RX ADMIN — INSULIN GLARGINE 12 UNIT(S): 100 INJECTION, SOLUTION SUBCUTANEOUS at 21:20

## 2020-02-15 RX ADMIN — Medication 1 MILLIGRAM(S): at 12:03

## 2020-02-15 RX ADMIN — ERTAPENEM SODIUM 120 MILLIGRAM(S): 1 INJECTION, POWDER, LYOPHILIZED, FOR SOLUTION INTRAMUSCULAR; INTRAVENOUS at 16:34

## 2020-02-15 RX ADMIN — LACTULOSE 20 GRAM(S): 10 SOLUTION ORAL at 14:35

## 2020-02-15 RX ADMIN — Medication 2: at 17:40

## 2020-02-15 RX ADMIN — Medication 40 MILLIGRAM(S): at 06:14

## 2020-02-15 NOTE — PROGRESS NOTE ADULT - ASSESSMENT
Pt is a 64M with IDDM, dyslipidemia, obesity, GERD, HFpEF with mild LV diastolic dysfunction, and decompensated JEAN cirrhosis complicated by ascites, history of SBP, hepatic encephalopathy, and chronic anemia with a history of duodenal ulcer as well as GAVE and duodenal AVM s/p APC (last on 10/11/19), who is UNOS listed for liver transplantation at [facility] who presents with confusion, weakness, and fall at home found to be in septic shock 2/2 e. coli bacteremia

## 2020-02-15 NOTE — PROGRESS NOTE ADULT - ASSESSMENT
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 [place] to be related to ACD. Also with MGUS  -- hgb adequate  -- transfuse for hgb <7  -- monitor CBC    MGUS -- outpt f/u    thrombocytopenia -- chronic and stable, likely due to cirrhosis  -- can be exacerbated but infection and abx  -- monitor CBC  -- transfuse for plts <10 if no bleeding, <50 if with bleeding    D/w pt, will follow, [phone] · Assessment		  65yo M with IDDM, dyslipidemia, obesity, GERD, HFpEF with mild LV diastolic dysfunction, and decompensated JEAN cirrhosis complicated by ascites, history of SBP, hepatic encephalopathy, and chronic anemia with a history of duodenal ulcer as well as GAVE and duodenal AVM s/p APC (last on 10/11/19), who is UNOS listed for liver transplantation at [facility] who presents with confusion, weakness, and fall at home, found to have sepsis and ESBL bacteremia    Bacteremia -- on abx  -- f/u ID    Anemia -- had extensive w/u in the past. [place] to be related to ACD. Also with MGUS  -- hgb adequate  -- transfuse for hgb <7  -- monitor CBC    MGUS -- outpt f/u    thrombocytopenia -- chronic and stable, likely due to cirrhosis  -- can be exacerbated but infection and abx  -- monitor CBC  -- transfuse for plts <10 if no bleeding, <50 if with bleeding    cirrhosis -- f/u hepatology    D/w pt, will follow, [phone]

## 2020-02-15 NOTE — PROGRESS NOTE ADULT - PROBLEM SELECTOR PLAN 1
septic shock 2/2 to emphysematous cystitis on CT with nonobstructive L renal calculus 8mm UCX 2/11 with  E coli  and  E.coli bacteremia, prior cultures ESBL   -switched to Invanz 2/13 --> BCX 2/12 and 2/13 NGTD  - will complete course on 2/21  -plan for midline, ID follow up for duration pending sensitivities   -continue with midodrine 20mg q8hr  - d/c albumin 100mg q6hr

## 2020-02-15 NOTE — PROGRESS NOTE ADULT - PROBLEM SELECTOR PLAN 3
MELD=29  -2/2 to JEAN cirrhosis   -continue with lasix 40mg daily /spironolactone 25mg daily for ascites  - small ascites on US not sufficient for safe paracentesis  -encephalopathy: mental status at baseline, patient is on lactulose and rifaximin at home.   -varices: last EGD showed small varices and GAVE on 12/30  -ascites: trace on CT/exam. History of SBP per chart but not found in [place]. On lasix/aldactone 40/100 at home. No Abx PPX  -HCC not seen on most recent CT.

## 2020-02-15 NOTE — PROGRESS NOTE ADULT - SUBJECTIVE AND OBJECTIVE BOX
[facility] Division of Hospital Medicine  Eric Hernandez DO  Pager (M-F, 5C-0L): [phone]  Other Times:  [phone]    Patient is a 64y old  Male who presents with a chief complaint of bacteremia (13 Feb 2020 17:34)      SUBJECTIVE / OVERNIGHT EVENTS:      patient seen and examined at bedside.  feels well no complaints    MEDICATIONS  (STANDING):  dextrose 5%. 1000 milliLiter(s) (50 mL/Hr) IV Continuous <Continuous>  dextrose 50% Injectable 12.5 Gram(s) IV Push once  dextrose 50% Injectable 25 Gram(s) IV Push once  dextrose 50% Injectable 25 Gram(s) IV Push once  enoxaparin Injectable 40 milliGRAM(s) SubCutaneous daily  ertapenem  IVPB 1000 milliGRAM(s) IV Intermittent every 24 hours  folic acid 1 milliGRAM(s) Oral daily  furosemide    Tablet 40 milliGRAM(s) Oral daily  insulin glargine Injectable (LANTUS) 12 Unit(s) SubCutaneous at bedtime  insulin lispro (HumaLOG) corrective regimen sliding scale   SubCutaneous three times a day before meals  insulin lispro (HumaLOG) corrective regimen sliding scale   SubCutaneous at bedtime  insulin lispro Injectable (HumaLOG) 4 Unit(s) SubCutaneous three times a day before meals  lactulose Syrup 20 Gram(s) Oral three times a day  midodrine 20 milliGRAM(s) Oral every 8 hours  multivitamin 1 Tablet(s) Oral daily  pantoprazole    Tablet 40 milliGRAM(s) Oral two times a day  rifAXIMin 550 milliGRAM(s) Oral two times a day  spironolactone 25 milliGRAM(s) Oral daily    MEDICATIONS  (PRN):  dextrose 40% Gel 15 Gram(s) Oral once PRN Blood Glucose LESS THAN 70 milliGRAM(s)/deciliter  glucagon  Injectable 1 milliGRAM(s) IntraMuscular once PRN Glucose LESS THAN 70 milligrams/deciliter      CAPILLARY BLOOD GLUCOSE      POCT Blood Glucose.: 119 mg/dL (15 Feb 2020 08:17)  POCT Blood Glucose.: 179 mg/dL (14 Feb 2020 22:00)  POCT Blood Glucose.: 209 mg/dL (14 Feb 2020 17:41)  POCT Blood Glucose.: 128 mg/dL (14 Feb 2020 12:28)    I&O's Summary    14 Feb 2020 07:01  -  15 Feb 2020 07:00  --------------------------------------------------------  IN: 1410 mL / OUT: 2000 mL / NET: -590 mL        PHYSICAL EXAM:  Vital Signs Last 24 Hrs  T(C): 36.9 (15 Feb 2020 05:35), Max: 37.1 (14 Feb 2020 20:53)  T(F): 98.4 (15 Feb 2020 05:35), Max: 98.8 (14 Feb 2020 20:53)  HR: 74 (15 Feb 2020 05:35) (74 - 82)  BP: 117/57 (15 Feb 2020 05:35) (105/62 - 131/64)  BP(mean): --  RR: 17 (15 Feb 2020 05:35) (17 - 18)  SpO2: 97% (15 Feb 2020 05:35) (96% - 97%)      PHYSICAL EXAM:  GENERAL: NAD, well-developed  EYES: EOMI, PERRL, icteric sclera   NECK: Supple, No JVD  CHEST/LUNG: Clear to auscultation bilaterally; No wheezes rales or rhonchi  HEART: s1 s2 Regular rate and rhythm; No murmurs, rubs  ABDOMEN: Soft, Nontender, Nondistended; Bowel sounds present. No fluid wave appreciated.   EXTREMITIES:  trace edema b/l. Chronic venous changes. No clubbing noted   PSYCH: AAOx3      LABS:                        8.8    9.36  )-----------( 93       ( 15 Feb 2020 06:39 )             26.0     02-15    133<L>  |  97  |  14  ----------------------------<  107<H>  3.7   |  27  |  0.95    Ca    9.3      15 Feb 2020 06:39    TPro  5.8<L>  /  Alb  2.8<L>  /  TBili  6.7<H>  /  DBili  x   /  AST  22  /  ALT  13  /  AlkPhos  99  02-15    PT/INR - ( 15 Feb 2020 09:25 )   PT: 25.5 sec;   INR: 2.18 ratio         PTT - ( 15 Feb 2020 09:25 )  PTT:48.6 sec          Culture - Blood (collected 13 Feb 2020 09:44)  Source: .Blood Blood-Venous  Preliminary Report (14 Feb 2020 10:02):    No growth to date.    Culture - Blood (collected 13 Feb 2020 09:44)  Source: .Blood Blood-Peripheral  Preliminary Report (14 Feb 2020 10:01):    No growth to date.    Culture - Blood (collected 12 Feb 2020 22:11)  Source: .Blood Blood  Preliminary Report (13 Feb 2020 23:01):    No growth to date.    Culture - Blood (collected 12 Feb 2020 22:11)  Source: .Blood Blood  Preliminary Report (13 Feb 2020 23:01):    No growth to date.        RADIOLOGY & ADDITIONAL TESTS:  Results Reviewed:   Imaging Personally Reviewed:  Electrocardiogram Personally Reviewed:    COORDINATION OF CARE:  Care Discussed with Consultants/Other Providers [Y/N]:  Prior or Outpatient Records Reviewed [Y/N]:

## 2020-02-16 LAB
ALBUMIN SERPL ELPH-MCNC: 2.7 G/DL — LOW (ref 3.3–5)
ALP SERPL-CCNC: 123 U/L — HIGH (ref 40–120)
ALT FLD-CCNC: 16 U/L — SIGNIFICANT CHANGE UP (ref 10–45)
ANION GAP SERPL CALC-SCNC: 8 MMOL/L — SIGNIFICANT CHANGE UP (ref 5–17)
AST SERPL-CCNC: 25 U/L — SIGNIFICANT CHANGE UP (ref 10–40)
BILIRUB SERPL-MCNC: 6.4 MG/DL — HIGH (ref 0.2–1.2)
BUN SERPL-MCNC: 16 MG/DL — SIGNIFICANT CHANGE UP (ref 7–23)
CALCIUM SERPL-MCNC: 9.3 MG/DL — SIGNIFICANT CHANGE UP (ref 8.4–10.5)
CHLORIDE SERPL-SCNC: 98 MMOL/L — SIGNIFICANT CHANGE UP (ref 96–108)
CO2 SERPL-SCNC: 29 MMOL/L — SIGNIFICANT CHANGE UP (ref 22–31)
CREAT SERPL-MCNC: 1.06 MG/DL — SIGNIFICANT CHANGE UP (ref 0.5–1.3)
GLUCOSE BLDC GLUCOMTR-MCNC: 130 MG/DL — HIGH (ref 70–99)
GLUCOSE BLDC GLUCOMTR-MCNC: 142 MG/DL — HIGH (ref 70–99)
GLUCOSE BLDC GLUCOMTR-MCNC: 145 MG/DL — HIGH (ref 70–99)
GLUCOSE BLDC GLUCOMTR-MCNC: 194 MG/DL — HIGH (ref 70–99)
GLUCOSE BLDC GLUCOMTR-MCNC: 260 MG/DL — HIGH (ref 70–99)
GLUCOSE SERPL-MCNC: 132 MG/DL — HIGH (ref 70–99)
HCT VFR BLD CALC: 26.3 % — LOW (ref 39–50)
HGB BLD-MCNC: 8.9 G/DL — LOW (ref 13–17)
INR BLD: 2.1 RATIO — HIGH (ref 0.88–1.16)
MCHC RBC-ENTMCNC: 33.8 GM/DL — SIGNIFICANT CHANGE UP (ref 32–36)
MCHC RBC-ENTMCNC: 35.6 PG — HIGH (ref 27–34)
MCV RBC AUTO: 105.2 FL — HIGH (ref 80–100)
NRBC # BLD: 0 /100 WBCS — SIGNIFICANT CHANGE UP (ref 0–0)
PLATELET # BLD AUTO: 97 K/UL — LOW (ref 150–400)
POTASSIUM SERPL-MCNC: 3.7 MMOL/L — SIGNIFICANT CHANGE UP (ref 3.5–5.3)
POTASSIUM SERPL-SCNC: 3.7 MMOL/L — SIGNIFICANT CHANGE UP (ref 3.5–5.3)
PROT SERPL-MCNC: 5.9 G/DL — LOW (ref 6–8.3)
PROTHROM AB SERPL-ACNC: 24.7 SEC — HIGH (ref 10–13.1)
RBC # BLD: 2.5 M/UL — LOW (ref 4.2–5.8)
RBC # FLD: 14.6 % — HIGH (ref 10.3–14.5)
SODIUM SERPL-SCNC: 135 MMOL/L — SIGNIFICANT CHANGE UP (ref 135–145)
WBC # BLD: 9.74 K/UL — SIGNIFICANT CHANGE UP (ref 3.8–10.5)
WBC # FLD AUTO: 9.74 K/UL — SIGNIFICANT CHANGE UP (ref 3.8–10.5)

## 2020-02-16 PROCEDURE — 99233 SBSQ HOSP IP/OBS HIGH 50: CPT

## 2020-02-16 RX ADMIN — PANTOPRAZOLE SODIUM 40 MILLIGRAM(S): 20 TABLET, DELAYED RELEASE ORAL at 17:28

## 2020-02-16 RX ADMIN — Medication 1 MILLIGRAM(S): at 13:07

## 2020-02-16 RX ADMIN — Medication 1 TABLET(S): at 13:07

## 2020-02-16 RX ADMIN — INSULIN GLARGINE 12 UNIT(S): 100 INJECTION, SOLUTION SUBCUTANEOUS at 21:53

## 2020-02-16 RX ADMIN — ENOXAPARIN SODIUM 40 MILLIGRAM(S): 100 INJECTION SUBCUTANEOUS at 13:08

## 2020-02-16 RX ADMIN — Medication 4 UNIT(S): at 09:42

## 2020-02-16 RX ADMIN — Medication 1: at 13:07

## 2020-02-16 RX ADMIN — LACTULOSE 20 GRAM(S): 10 SOLUTION ORAL at 05:46

## 2020-02-16 RX ADMIN — SPIRONOLACTONE 25 MILLIGRAM(S): 25 TABLET, FILM COATED ORAL at 18:24

## 2020-02-16 RX ADMIN — MIDODRINE HYDROCHLORIDE 20 MILLIGRAM(S): 2.5 TABLET ORAL at 21:31

## 2020-02-16 RX ADMIN — Medication 0: at 09:41

## 2020-02-16 RX ADMIN — Medication 4 UNIT(S): at 18:24

## 2020-02-16 RX ADMIN — Medication 1: at 21:54

## 2020-02-16 RX ADMIN — MIDODRINE HYDROCHLORIDE 20 MILLIGRAM(S): 2.5 TABLET ORAL at 13:08

## 2020-02-16 RX ADMIN — LACTULOSE 20 GRAM(S): 10 SOLUTION ORAL at 13:08

## 2020-02-16 RX ADMIN — Medication 40 MILLIGRAM(S): at 09:42

## 2020-02-16 RX ADMIN — LACTULOSE 20 GRAM(S): 10 SOLUTION ORAL at 21:31

## 2020-02-16 RX ADMIN — MIDODRINE HYDROCHLORIDE 20 MILLIGRAM(S): 2.5 TABLET ORAL at 05:46

## 2020-02-16 RX ADMIN — PANTOPRAZOLE SODIUM 40 MILLIGRAM(S): 20 TABLET, DELAYED RELEASE ORAL at 05:47

## 2020-02-16 RX ADMIN — Medication 4 UNIT(S): at 13:07

## 2020-02-16 RX ADMIN — ERTAPENEM SODIUM 120 MILLIGRAM(S): 1 INJECTION, POWDER, LYOPHILIZED, FOR SOLUTION INTRAMUSCULAR; INTRAVENOUS at 17:27

## 2020-02-16 NOTE — PROGRESS NOTE ADULT - ASSESSMENT
64M with IDDM, dyslipidemia, obesity, GERD, HFpEF with mild LV diastolic dysfunction, and decompensated JEAN cirrhosis complicated by ascites, history of SBP, hepatic encephalopathy, and chronic anemia with a history of duodenal ulcer  s/p APC (last on 10/11/19), who is UNOS listed for liver transplantation at [facility] admitted with UTI and bacteremia.    JEAN cirrhosis:  -MELD captured at 24 today  -Appreciate ID following plan for antibiotic therapy Invanz ending 2/21  -continue with lasix 40mg daily /spironolactone 25mg daily for ascites  -continue with midodrine 20mg q8hr  -continue with lactulose 20mg tid 3-4 bowel movements and rifaximin.   - small ascites on US not sufficient for safe paracentesis  - Encephalopathy improved  - DVT ppx with lovenox  -Diabetes management per primary team

## 2020-02-16 NOTE — PROGRESS NOTE ADULT - SUBJECTIVE AND OBJECTIVE BOX
Transplant Surgery - Multidisciplinary Rounds  JEAN: listed for liver transplant, MELD score today =24    Present:  Patient seen with multidisciplinary team including Transplant Surgeon: Dr. Alonzo, NP Patrick in am rounds and examined with Dr. Alonzo. Disciplines not in attendance will be notified of the plan.     HPI: 64M with IDDM, dyslipidemia, obesity, GERD, HFpEF with mild LV diastolic dysfunction, and decompensated JEAN cirrhosis complicated by ascites, history of SBP, hepatic encephalopathy, and chronic anemia with a history of duodenal ulcer  s/p APC (last on 10/11/19), who is UNOS listed for liver transplantation at [facility]. Admitted on 2/11with confusion, weakness, and fall at home. The patient was recently admitted for a Lower extremity hematoma and ESBL E Coli UTI and had completed a full course of abx (bactrim per ID), was found to be in septic shock 2/2 e. coli bacteremia, and UCX 2/11 with  E coli. CT with nonobstructive L renal calculus 8mm.     Interval Events:    PE:        MEDICATIONS  (STANDING):  dextrose 5%. 1000 milliLiter(s) (50 mL/Hr) IV Continuous <Continuous>  dextrose 50% Injectable 12.5 Gram(s) IV Push once  dextrose 50% Injectable 25 Gram(s) IV Push once  dextrose 50% Injectable 25 Gram(s) IV Push once  enoxaparin Injectable 40 milliGRAM(s) SubCutaneous daily  ertapenem  IVPB 1000 milliGRAM(s) IV Intermittent every 24 hours  folic acid 1 milliGRAM(s) Oral daily  furosemide    Tablet 40 milliGRAM(s) Oral daily  insulin glargine Injectable (LANTUS) 12 Unit(s) SubCutaneous at bedtime  insulin lispro (HumaLOG) corrective regimen sliding scale   SubCutaneous three times a day before meals  insulin lispro (HumaLOG) corrective regimen sliding scale   SubCutaneous at bedtime  insulin lispro Injectable (HumaLOG) 4 Unit(s) SubCutaneous three times a day before meals  lactulose Syrup 20 Gram(s) Oral three times a day  midodrine 20 milliGRAM(s) Oral every 8 hours  multivitamin 1 Tablet(s) Oral daily  pantoprazole    Tablet 40 milliGRAM(s) Oral two times a day  rifAXIMin 550 milliGRAM(s) Oral two times a day  spironolactone 25 milliGRAM(s) Oral daily    MEDICATIONS  (PRN):  dextrose 40% Gel 15 Gram(s) Oral once PRN Blood Glucose LESS THAN 70 milliGRAM(s)/deciliter  glucagon  Injectable 1 milliGRAM(s) IntraMuscular once PRN Glucose LESS THAN 70 milligrams/deciliter      PAST MEDICAL & SURGICAL HISTORY:  GIB (gastrointestinal bleeding)  GERD with esophagitis: Gastritis &amp; Non Bleeding Ulcers  Hepatic encephalopathy  Obesity  Fatty liver disease, nonalcoholic  Renal stones: 25 years ago  Hypertension  Neuropathy  Hypercholesteremia  Diabetes  S/P cholecystectomy      Vital Signs Last 24 Hrs  T(C): 36.7 (16 Feb 2020 09:35), Max: 36.8 (15 Feb 2020 12:50)  T(F): 98.1 (16 Feb 2020 09:35), Max: 98.3 (15 Feb 2020 12:50)  HR: 70 (16 Feb 2020 09:35) (70 - 81)  BP: 104/64 (16 Feb 2020 09:35) (102/55 - 112/64)  BP(mean): --  RR: 18 (16 Feb 2020 09:35) (17 - 18)  SpO2: 99% (16 Feb 2020 09:35) (96% - 99%)    I&O's Summary    15 Feb 2020 07:01  -  16 Feb 2020 07:00  --------------------------------------------------------  IN: 1000 mL / OUT: 1850 mL / NET: -850 mL    16 Feb 2020 07:01  -  16 Feb 2020 11:58  --------------------------------------------------------  IN: 0 mL / OUT: 800 mL / NET: -800 mL                              8.9    9.74  )-----------( 97       ( 16 Feb 2020 06:29 )             26.3     02-16    135  |  98  |  16  ----------------------------<  132<H>  3.7   |  29  |  1.06    Ca    9.3      16 Feb 2020 06:29    TPro  5.9<L>  /  Alb  2.7<L>  /  TBili  6.4<H>  /  DBili  x   /  AST  25  /  ALT  16  /  AlkPhos  123<H>  02-16        REVIEW OF SYSTEMS  --------------------------------------------------------------------------------  Gen: No weight changes, fatigue, fevers/chills, weakness  Skin: No rashes  Head/Eyes/Ears/Mouth: No headache; Normal hearing; Normal vision w/o blurriness; No sinus pain/discomfort, sore throat  Respiratory: No dyspnea, cough, wheezing, hemoptysis  CV: No chest pain, PND, orthopnea  GI: No abdominal pain, diarrhea, constipation, nausea, vomiting, melena, hematochezia  : No increased frequency, dysuria, hematuria, nocturia  MSK: No joint pain/swelling; no back pain; no edema  Neuro: No dizziness/lightheadedness, weakness, seizures, numbness, tingling  Heme: No easy bruising or bleeding  Endo: No heat/cold intolerance  Psych: No significant nervousness, anxiety, stress, depression  All other systems were reviewed and are negative, except as noted.      PHYSICAL EXAM:  Constitutional: Jaundice skin color   Eyes: Anicteric, PERRLA  ENMT: nc/at  Neck: Supple  Respiratory: CTA B/L  Cardiovascular: RRR  Gastrointestinal: Soft abdomen, NT, softly distended  Genitourinary: Beas[place] in place   Extremities: SCD's in place and working bilaterally, LUE with PICC intact  Vascular: Palpable dp pulses bilaterally  Neurological: A&O x3  Musculoskeletal: Moving all extremities  Psychiatric: Responsive

## 2020-02-16 NOTE — PROGRESS NOTE ADULT - ASSESSMENT
Pt is a 64M with IDDM, dyslipidemia, obesity, GERD, HFpEF with mild LV diastolic dysfunction, and decompensated JEAN cirrhosis complicated by ascites, history of SBP, hepatic encephalopathy, and chronic anemia with a history of duodenal ulcer as well as GAVE and duodenal AVM s/p APC (last on 10/11/19), who is UNOS listed for liver transplantation at [facility] who presents with confusion, weakness, and fall at home found to be in septic shock 2/2 e. coli bacteremia

## 2020-02-16 NOTE — PROGRESS NOTE ADULT - PROBLEM SELECTOR PLAN 3
MELD=29  -2/2 to JEAN cirrhosis   -continue with lasix 40mg daily /spironolactone 25mg daily for ascites  - small ascites on US not sufficient for safe paracentesis  -encephalopathy: mental status at baseline, patient is on lactulose and rifaximin at home.   -varices: last EGD showed small varices and GAVE on 12/30  -ascites: trace on CT/exam. History of SBP per chart but not found in [place]. On lasix/aldactone 40/100 at home. No Abx PPX  -HCC not seen on most recent CT.

## 2020-02-16 NOTE — PROGRESS NOTE ADULT - SUBJECTIVE AND OBJECTIVE BOX
[facility] Division of Hospital Medicine  Eric Hernandez DO  Pager (M-F, 1B-8K): [phone]  Other Times:  [phone]    Patient is a 64y old  Male who presents with a chief complaint of bacteremia (13 Feb 2020 17:34)      SUBJECTIVE / OVERNIGHT EVENTS:    patient seen and examined. feels well. no complaints.     MEDICATIONS  (STANDING):  dextrose 5%. 1000 milliLiter(s) (50 mL/Hr) IV Continuous <Continuous>  dextrose 50% Injectable 12.5 Gram(s) IV Push once  dextrose 50% Injectable 25 Gram(s) IV Push once  dextrose 50% Injectable 25 Gram(s) IV Push once  enoxaparin Injectable 40 milliGRAM(s) SubCutaneous daily  ertapenem  IVPB 1000 milliGRAM(s) IV Intermittent every 24 hours  folic acid 1 milliGRAM(s) Oral daily  furosemide    Tablet 40 milliGRAM(s) Oral daily  insulin glargine Injectable (LANTUS) 12 Unit(s) SubCutaneous at bedtime  insulin lispro (HumaLOG) corrective regimen sliding scale   SubCutaneous three times a day before meals  insulin lispro (HumaLOG) corrective regimen sliding scale   SubCutaneous at bedtime  insulin lispro Injectable (HumaLOG) 4 Unit(s) SubCutaneous three times a day before meals  lactulose Syrup 20 Gram(s) Oral three times a day  midodrine 20 milliGRAM(s) Oral every 8 hours  multivitamin 1 Tablet(s) Oral daily  pantoprazole    Tablet 40 milliGRAM(s) Oral two times a day  rifAXIMin 550 milliGRAM(s) Oral two times a day  spironolactone 25 milliGRAM(s) Oral daily    MEDICATIONS  (PRN):  dextrose 40% Gel 15 Gram(s) Oral once PRN Blood Glucose LESS THAN 70 milliGRAM(s)/deciliter  glucagon  Injectable 1 milliGRAM(s) IntraMuscular once PRN Glucose LESS THAN 70 milligrams/deciliter      CAPILLARY BLOOD GLUCOSE      POCT Blood Glucose.: 142 mg/dL (16 Feb 2020 09:21)  POCT Blood Glucose.: 130 mg/dL (16 Feb 2020 08:07)  POCT Blood Glucose.: 219 mg/dL (15 Feb 2020 21:02)  POCT Blood Glucose.: 223 mg/dL (15 Feb 2020 17:32)  POCT Blood Glucose.: 159 mg/dL (15 Feb 2020 12:31)    I&O's Summary    15 Feb 2020 07:01  -  16 Feb 2020 07:00  --------------------------------------------------------  IN: 1000 mL / OUT: 1850 mL / NET: -850 mL        PHYSICAL EXAM:  Vital Signs Last 24 Hrs  T(C): 36.7 (16 Feb 2020 09:35), Max: 36.8 (15 Feb 2020 12:50)  T(F): 98.1 (16 Feb 2020 09:35), Max: 98.3 (15 Feb 2020 12:50)  HR: 70 (16 Feb 2020 09:35) (70 - 81)  BP: 104/64 (16 Feb 2020 09:35) (102/55 - 112/64)  BP(mean): --  RR: 18 (16 Feb 2020 09:35) (17 - 18)  SpO2: 99% (16 Feb 2020 09:35) (96% - 99%)    PHYSICAL EXAM:  GENERAL: NAD, well-developed  EYES: EOMI, PERRL, icteric sclera   NECK: Supple, No JVD  CHEST/LUNG: Clear to auscultation bilaterally; No wheezes rales or rhonchi  HEART: s1 s2 Regular rate and rhythm; No murmurs, rubs  ABDOMEN: Soft, Nontender, Nondistended; Bowel sounds present. No fluid wave appreciated.   EXTREMITIES:  trace edema b/l. Chronic venous changes. No clubbing noted   L midline catheter + c/d/i  PSYCH: AAOx3      LABS:                        8.9    9.74  )-----------( 97       ( 16 Feb 2020 06:29 )             26.3     02-16    135  |  98  |  16  ----------------------------<  132<H>  3.7   |  29  |  1.06    Ca    9.3      16 Feb 2020 06:29    TPro  5.9<L>  /  Alb  2.7<L>  /  TBili  6.4<H>  /  DBili  x   /  AST  25  /  ALT  16  /  AlkPhos  123<H>  02-16    PT/INR - ( 16 Feb 2020 08:48 )   PT: 24.7 sec;   INR: 2.10 ratio         PTT - ( 15 Feb 2020 09:25 )  PTT:48.6 sec            RADIOLOGY & ADDITIONAL TESTS:  Results Reviewed:   Imaging Personally Reviewed:  Electrocardiogram Personally Reviewed:    COORDINATION OF CARE:  Care Discussed with Consultants/Other Providers [Y/N]:  Prior or Outpatient Records Reviewed [Y/N]:

## 2020-02-17 LAB
ANION GAP SERPL CALC-SCNC: 8 MMOL/L — SIGNIFICANT CHANGE UP (ref 5–17)
BUN SERPL-MCNC: 16 MG/DL — SIGNIFICANT CHANGE UP (ref 7–23)
CALCIUM SERPL-MCNC: 9.2 MG/DL — SIGNIFICANT CHANGE UP (ref 8.4–10.5)
CHLORIDE SERPL-SCNC: 98 MMOL/L — SIGNIFICANT CHANGE UP (ref 96–108)
CO2 SERPL-SCNC: 29 MMOL/L — SIGNIFICANT CHANGE UP (ref 22–31)
CREAT SERPL-MCNC: 1.12 MG/DL — SIGNIFICANT CHANGE UP (ref 0.5–1.3)
CULTURE RESULTS: SIGNIFICANT CHANGE UP
CULTURE RESULTS: SIGNIFICANT CHANGE UP
GLUCOSE BLDC GLUCOMTR-MCNC: 128 MG/DL — HIGH (ref 70–99)
GLUCOSE BLDC GLUCOMTR-MCNC: 212 MG/DL — HIGH (ref 70–99)
GLUCOSE BLDC GLUCOMTR-MCNC: 242 MG/DL — HIGH (ref 70–99)
GLUCOSE BLDC GLUCOMTR-MCNC: 288 MG/DL — HIGH (ref 70–99)
GLUCOSE SERPL-MCNC: 114 MG/DL — HIGH (ref 70–99)
HCT VFR BLD CALC: 25.8 % — LOW (ref 39–50)
HGB BLD-MCNC: 8.7 G/DL — LOW (ref 13–17)
MCHC RBC-ENTMCNC: 33.7 GM/DL — SIGNIFICANT CHANGE UP (ref 32–36)
MCHC RBC-ENTMCNC: 35.5 PG — HIGH (ref 27–34)
MCV RBC AUTO: 105.3 FL — HIGH (ref 80–100)
NRBC # BLD: 0 /100 WBCS — SIGNIFICANT CHANGE UP (ref 0–0)
PLATELET # BLD AUTO: 87 K/UL — LOW (ref 150–400)
POTASSIUM SERPL-MCNC: 3.5 MMOL/L — SIGNIFICANT CHANGE UP (ref 3.5–5.3)
POTASSIUM SERPL-SCNC: 3.5 MMOL/L — SIGNIFICANT CHANGE UP (ref 3.5–5.3)
RBC # BLD: 2.45 M/UL — LOW (ref 4.2–5.8)
RBC # FLD: 14.6 % — HIGH (ref 10.3–14.5)
SODIUM SERPL-SCNC: 135 MMOL/L — SIGNIFICANT CHANGE UP (ref 135–145)
SPECIMEN SOURCE: SIGNIFICANT CHANGE UP
SPECIMEN SOURCE: SIGNIFICANT CHANGE UP
WBC # BLD: 9.63 K/UL — SIGNIFICANT CHANGE UP (ref 3.8–10.5)
WBC # FLD AUTO: 9.63 K/UL — SIGNIFICANT CHANGE UP (ref 3.8–10.5)

## 2020-02-17 PROCEDURE — 99232 SBSQ HOSP IP/OBS MODERATE 35: CPT

## 2020-02-17 PROCEDURE — 99233 SBSQ HOSP IP/OBS HIGH 50: CPT

## 2020-02-17 RX ADMIN — PANTOPRAZOLE SODIUM 40 MILLIGRAM(S): 20 TABLET, DELAYED RELEASE ORAL at 05:12

## 2020-02-17 RX ADMIN — Medication 1 TABLET(S): at 12:06

## 2020-02-17 RX ADMIN — Medication 40 MILLIGRAM(S): at 05:13

## 2020-02-17 RX ADMIN — ERTAPENEM SODIUM 120 MILLIGRAM(S): 1 INJECTION, POWDER, LYOPHILIZED, FOR SOLUTION INTRAMUSCULAR; INTRAVENOUS at 15:29

## 2020-02-17 RX ADMIN — Medication 4 UNIT(S): at 12:07

## 2020-02-17 RX ADMIN — MIDODRINE HYDROCHLORIDE 20 MILLIGRAM(S): 2.5 TABLET ORAL at 05:12

## 2020-02-17 RX ADMIN — Medication 2: at 17:58

## 2020-02-17 RX ADMIN — Medication 4 UNIT(S): at 08:33

## 2020-02-17 RX ADMIN — INSULIN GLARGINE 12 UNIT(S): 100 INJECTION, SOLUTION SUBCUTANEOUS at 21:44

## 2020-02-17 RX ADMIN — MIDODRINE HYDROCHLORIDE 20 MILLIGRAM(S): 2.5 TABLET ORAL at 21:44

## 2020-02-17 RX ADMIN — MIDODRINE HYDROCHLORIDE 20 MILLIGRAM(S): 2.5 TABLET ORAL at 14:37

## 2020-02-17 RX ADMIN — LACTULOSE 20 GRAM(S): 10 SOLUTION ORAL at 14:37

## 2020-02-17 RX ADMIN — Medication 3: at 12:07

## 2020-02-17 RX ADMIN — LACTULOSE 20 GRAM(S): 10 SOLUTION ORAL at 05:12

## 2020-02-17 RX ADMIN — SPIRONOLACTONE 25 MILLIGRAM(S): 25 TABLET, FILM COATED ORAL at 05:12

## 2020-02-17 RX ADMIN — Medication 1 MILLIGRAM(S): at 12:06

## 2020-02-17 RX ADMIN — ENOXAPARIN SODIUM 40 MILLIGRAM(S): 100 INJECTION SUBCUTANEOUS at 12:06

## 2020-02-17 RX ADMIN — PANTOPRAZOLE SODIUM 40 MILLIGRAM(S): 20 TABLET, DELAYED RELEASE ORAL at 17:17

## 2020-02-17 RX ADMIN — Medication 4 UNIT(S): at 17:57

## 2020-02-17 NOTE — PROGRESS NOTE ADULT - PROBLEM SELECTOR PLAN 3
MELD=29  -2/2 to JEAN cirrhosis   -continue with lasix 40mg daily /spironolactone 25mg daily for ascites  - small ascites on US not sufficient for safe paracentesis  -encephalopathy: mental status at baseline, patient is on lactulose and rifaximin at home.   -varices: last EGD showed small varices and GAVE on 12/30  -ascites: trace on CT/exam. History of SBP per chart but not found in [place]. On lasix/aldactone 40/100 at home. No Abx PPX  -HCC not seen on most recent CT.

## 2020-02-17 NOTE — PROGRESS NOTE ADULT - ASSESSMENT
63 yo M with IDDM, dyslipidemia, obesity, GERD, HFpEF with mild LV diastolic dysfunction, and decompensated JEAN cirrhosis complicated by ascites, history of SBP, hepatic encephalopathy, and chronic anemia with a history of duodenal ulcer as well as GAVE and duodenal AVM s/p APC, recently admitted to [facility] from 12/26/19-1/10/20 (with LLE hematoma, UTI, and hepatic encephalopathy), who is UNOS listed for liver transplantation at [facility]  currently presenting with septic shock secondary to emphysematous cystitis with ESBL E. coli bacteremia and hepatic encephalopathy.    Impression:     # Septic shock 2/2 Ecoli bacteremia, from urinary source. Urine culture now growing GNR, repeat blood cultures negative.   # Decompensated JEAN Cirrhosis, UNOS listed. Current MELD Na 25 02/16/2020 (same baseline of 25).   -encephalopathy: mental status at baseline, patient is on lactulose and rifaximin at home.   -varices: last EGD showed small varices and GAVE on 12/30  -ascites: trace on CT/exam. History of SBP per chart but not found in [place]. On lasix/aldactone 40/100 at home. No Abx PPX  -HCC not seen on most recent CT.     # DM    Recommendation:  - continue with ertapenem as per ID ending 2/21  - continue with lasix 40mg daily /spironolactone 25mg daily for ascites  - continue with midodrine 20mg q8hr  - continue with lactulose 20mg tid 3-4 bowel movements and rifaximin.   - small ascites on US not sufficient for safe paracentesis  - Encephalopathy improved  - DVT ppx with lovenox  - continue fluid restriction (1.5L

## 2020-02-17 NOTE — PROGRESS NOTE ADULT - ATTENDING COMMENTS
Hepatology Staff: Yomi Medina MD    I saw and examined the patient along with  Dr. Sorensen 02-17-20 @ 08:36.    Vitals: Vital Signs Last 24 Hrs  T(C): 36.4 (17 Feb 2020 04:33), Max: 36.8 (16 Feb 2020 21:14)  T(F): 97.5 (17 Feb 2020 04:33), Max: 98.3 (16 Feb 2020 21:14)  HR: 73 (17 Feb 2020 04:33) (70 - 82)  BP: 122/61 (17 Feb 2020 04:33) (100/63 - 122/61)  RR: 18 (17 Feb 2020 04:33) (18 - 18)  SpO2: 99% (17 Feb 2020 04:33) (98% - 99%)    Labs:Creatinine, Serum: 1.12 mg/dL (02-17-20 @ 05:58)  INR: 2.10 ratio (02-16-20 @ 08:48)    MELD Score: 25  Imaging Studies:  I/O: I&O's Summary    16 Feb 2020 07:01  -  17 Feb 2020 07:00  --------------------------------------------------------  IN: 200 mL / OUT: 800 mL / NET: -600 mL      Last 24 hour events: None    Recommendations: Continue with Ertapenem, D/C home once arrangement for home antibiotics is made.     Plan discussed with Primary team.

## 2020-02-17 NOTE — PROGRESS NOTE ADULT - ASSESSMENT
Pt is a 64M with IDDM, dyslipidemia, obesity, GERD, HFpEF with mild LV diastolic dysfunction, and decompensated JEAN cirrhosis complicated by ascites, history of SBP, hepatic encephalopathy, and chronic anemia with a history of duodenal ulcer as well as GAVE and duodenal AVM s/p APC (last on 10/11/19), who is UNOS listed for liver transplantation at [facility] who presents with confusion, weakness, and fall at home found to be in septic shock 2/2 e. coli bacteremia

## 2020-02-17 NOTE — PROGRESS NOTE ADULT - SUBJECTIVE AND OBJECTIVE BOX
[facility] Division of Hospital Medicine  Eric Hernandez DO  Pager (KERRY-F, 0M-9N): [phone]  Other Times:  [phone]    Patient is a 64y old  Male who presents with a chief complaint of Septic shock secondary to UTI (16 Feb 2020 11:56)      SUBJECTIVE / OVERNIGHT EVENTS:  patient seen and examined at bedside.  feels well.       MEDICATIONS  (STANDING):  dextrose 5%. 1000 milliLiter(s) (50 mL/Hr) IV Continuous <Continuous>  dextrose 50% Injectable 12.5 Gram(s) IV Push once  dextrose 50% Injectable 25 Gram(s) IV Push once  dextrose 50% Injectable 25 Gram(s) IV Push once  enoxaparin Injectable 40 milliGRAM(s) SubCutaneous daily  ertapenem  IVPB 1000 milliGRAM(s) IV Intermittent every 24 hours  folic acid 1 milliGRAM(s) Oral daily  furosemide    Tablet 40 milliGRAM(s) Oral daily  insulin glargine Injectable (LANTUS) 12 Unit(s) SubCutaneous at bedtime  insulin lispro (HumaLOG) corrective regimen sliding scale   SubCutaneous three times a day before meals  insulin lispro (HumaLOG) corrective regimen sliding scale   SubCutaneous at bedtime  insulin lispro Injectable (HumaLOG) 4 Unit(s) SubCutaneous three times a day before meals  lactulose Syrup 20 Gram(s) Oral three times a day  midodrine 20 milliGRAM(s) Oral every 8 hours  multivitamin 1 Tablet(s) Oral daily  pantoprazole    Tablet 40 milliGRAM(s) Oral two times a day  rifAXIMin 550 milliGRAM(s) Oral two times a day  spironolactone 25 milliGRAM(s) Oral daily    MEDICATIONS  (PRN):  dextrose 40% Gel 15 Gram(s) Oral once PRN Blood Glucose LESS THAN 70 milliGRAM(s)/deciliter  glucagon  Injectable 1 milliGRAM(s) IntraMuscular once PRN Glucose LESS THAN 70 milligrams/deciliter      CAPILLARY BLOOD GLUCOSE      POCT Blood Glucose.: 128 mg/dL (17 Feb 2020 08:14)  POCT Blood Glucose.: 260 mg/dL (16 Feb 2020 21:51)  POCT Blood Glucose.: 145 mg/dL (16 Feb 2020 17:48)  POCT Blood Glucose.: 194 mg/dL (16 Feb 2020 13:00)    I&O's Summary    16 Feb 2020 07:01  -  17 Feb 2020 07:00  --------------------------------------------------------  IN: 200 mL / OUT: 800 mL / NET: -600 mL        PHYSICAL EXAM:  Vital Signs Last 24 Hrs  T(C): 36.4 (17 Feb 2020 04:33), Max: 36.8 (16 Feb 2020 21:14)  T(F): 97.5 (17 Feb 2020 04:33), Max: 98.3 (16 Feb 2020 21:14)  HR: 73 (17 Feb 2020 04:33) (73 - 82)  BP: 122/61 (17 Feb 2020 04:33) (100/63 - 122/61)  BP(mean): --  RR: 18 (17 Feb 2020 04:33) (18 - 18)  SpO2: 99% (17 Feb 2020 04:33) (98% - 99%)      PHYSICAL EXAM:  GENERAL: NAD, well-developed  EYES: EOMI, PERRL, icteric sclera   NECK: Supple, No JVD  CHEST/LUNG: Clear to auscultation bilaterally; No wheezes rales or rhonchi  HEART: s1 s2 Regular rate and rhythm; No murmurs, rubs  ABDOMEN: Soft, Nontender, Nondistended; Bowel sounds present. No fluid wave appreciated.   EXTREMITIES:  trace edema b/l. Chronic venous changes. No clubbing noted   L midline catheter + c/d/i  PSYCH: AAOx3    LABS:                        8.7    9.63  )-----------( 87       ( 17 Feb 2020 05:54 )             25.8     02-17    135  |  98  |  16  ----------------------------<  114<H>  3.5   |  29  |  1.12    Ca    9.2      17 Feb 2020 05:58    TPro  5.9<L>  /  Alb  2.7<L>  /  TBili  6.4<H>  /  DBili  x   /  AST  25  /  ALT  16  /  AlkPhos  123<H>  02-16    PT/INR - ( 16 Feb 2020 08:48 )   PT: 24.7 sec;   INR: 2.10 ratio                     RADIOLOGY & ADDITIONAL TESTS:  Results Reviewed:   Imaging Personally Reviewed:  Electrocardiogram Personally Reviewed:    COORDINATION OF CARE:  Care Discussed with Consultants/Other Providers [Y/N]:  Prior or Outpatient Records Reviewed [Y/N]:  ZBIGNIEW Pratt

## 2020-02-17 NOTE — PROGRESS NOTE ADULT - SUBJECTIVE AND OBJECTIVE BOX
Interval Events:   No new complaints     Hospital Medications:  dextrose 40% Gel 15 Gram(s) Oral once PRN  dextrose 5%. 1000 milliLiter(s) IV Continuous <Continuous>  dextrose 50% Injectable 12.5 Gram(s) IV Push once  dextrose 50% Injectable 25 Gram(s) IV Push once  dextrose 50% Injectable 25 Gram(s) IV Push once  enoxaparin Injectable 40 milliGRAM(s) SubCutaneous daily  ertapenem  IVPB 1000 milliGRAM(s) IV Intermittent every 24 hours  folic acid 1 milliGRAM(s) Oral daily  furosemide    Tablet 40 milliGRAM(s) Oral daily  glucagon  Injectable 1 milliGRAM(s) IntraMuscular once PRN  insulin glargine Injectable (LANTUS) 12 Unit(s) SubCutaneous at bedtime  insulin lispro (HumaLOG) corrective regimen sliding scale   SubCutaneous three times a day before meals  insulin lispro (HumaLOG) corrective regimen sliding scale   SubCutaneous at bedtime  insulin lispro Injectable (HumaLOG) 4 Unit(s) SubCutaneous three times a day before meals  lactulose Syrup 20 Gram(s) Oral three times a day  midodrine 20 milliGRAM(s) Oral every 8 hours  multivitamin 1 Tablet(s) Oral daily  pantoprazole    Tablet 40 milliGRAM(s) Oral two times a day  rifAXIMin 550 milliGRAM(s) Oral two times a day  spironolactone 25 milliGRAM(s) Oral daily        ROS:   General:  No fevers, chills or night sweats  ENT:  No sore throat or dysphagia  CV:  No pain or palpitations  Resp:  No dyspnea, cough or  wheezing  GI:  as above  Skin:  No rash or edema  Neuro: no weakness   Hematologic: no bleeding  Musculoskeletal: no muscle pain or join pain  Psych: no agitation      PHYSICAL EXAM:   Vital Signs:  Vital Signs Last 24 Hrs  T(C): 36.4 (17 Feb 2020 04:33), Max: 36.8 (16 Feb 2020 21:14)  T(F): 97.5 (17 Feb 2020 04:33), Max: 98.3 (16 Feb 2020 21:14)  HR: 73 (17 Feb 2020 04:33) (70 - 82)  BP: 122/61 (17 Feb 2020 04:33) (100/63 - 122/61)  BP(mean): --  RR: 18 (17 Feb 2020 04:33) (18 - 18)  SpO2: 99% (17 Feb 2020 04:33) (98% - 99%)  Daily     Daily     GENERAL: NAD, well-developed  EYES: EOMI, PERRL, icteric sclera   NECK: Supple, No JVD  CHEST/LUNG: Clear to auscultation bilaterally; No wheezes rales or rhonchi  HEART: s1 s2 Regular rate and rhythm; No murmurs, rubs  ABDOMEN: Soft, Nontender, Nondistended; Bowel sounds present. No fluid wave appreciated.   EXTREMITIES:  trace edema b/l. Chronic venous changes. No clubbing noted   L midline catheter + c/d/i  PSYCH: AAOx3    LABS:                        8.7    9.63  )-----------( 87       ( 17 Feb 2020 05:54 )             25.8     Mean Cell Volume: 105.3 fl (02-17-20 @ 05:54)    02-17    135  |  98  |  16  ----------------------------<  114<H>  3.5   |  29  |  1.12    Ca    9.2      17 Feb 2020 05:58    TPro  5.9<L>  /  Alb  2.7<L>  /  TBili  6.4<H>  /  DBili  x   /  AST  25  /  ALT  16  /  AlkPhos  123<H>  02-16    LIVER FUNCTIONS - ( 16 Feb 2020 06:29 )  Alb: 2.7 g/dL / Pro: 5.9 g/dL / ALK PHOS: 123 U/L / ALT: 16 U/L / AST: 25 U/L / GGT: x           PT/INR - ( 16 Feb 2020 08:48 )   PT: 24.7 sec;   INR: 2.10 ratio         PTT - ( 15 Feb 2020 09:25 )  PTT:48.6 sec                            8.7    9.63  )-----------( 87       ( 17 Feb 2020 05:54 )             25.8                         8.9    9.74  )-----------( 97       ( 16 Feb 2020 06:29 )             26.3                         8.8    9.36  )-----------( 93       ( 15 Feb 2020 06:39 )             26.0       Imaging:

## 2020-02-18 LAB
ALBUMIN SERPL ELPH-MCNC: 2.8 G/DL — LOW (ref 3.3–5)
ALP SERPL-CCNC: 144 U/L — HIGH (ref 40–120)
ALT FLD-CCNC: 16 U/L — SIGNIFICANT CHANGE UP (ref 10–45)
ANION GAP SERPL CALC-SCNC: 9 MMOL/L — SIGNIFICANT CHANGE UP (ref 5–17)
APTT BLD: 41.4 SEC — HIGH (ref 27.5–36.3)
AST SERPL-CCNC: 30 U/L — SIGNIFICANT CHANGE UP (ref 10–40)
BILIRUB SERPL-MCNC: 6.5 MG/DL — HIGH (ref 0.2–1.2)
BUN SERPL-MCNC: 19 MG/DL — SIGNIFICANT CHANGE UP (ref 7–23)
CALCIUM SERPL-MCNC: 9.3 MG/DL — SIGNIFICANT CHANGE UP (ref 8.4–10.5)
CHLORIDE SERPL-SCNC: 97 MMOL/L — SIGNIFICANT CHANGE UP (ref 96–108)
CO2 SERPL-SCNC: 29 MMOL/L — SIGNIFICANT CHANGE UP (ref 22–31)
CREAT SERPL-MCNC: 1.27 MG/DL — SIGNIFICANT CHANGE UP (ref 0.5–1.3)
CULTURE RESULTS: SIGNIFICANT CHANGE UP
CULTURE RESULTS: SIGNIFICANT CHANGE UP
GLUCOSE BLDC GLUCOMTR-MCNC: 113 MG/DL — HIGH (ref 70–99)
GLUCOSE BLDC GLUCOMTR-MCNC: 179 MG/DL — HIGH (ref 70–99)
GLUCOSE BLDC GLUCOMTR-MCNC: 200 MG/DL — HIGH (ref 70–99)
GLUCOSE BLDC GLUCOMTR-MCNC: 246 MG/DL — HIGH (ref 70–99)
GLUCOSE SERPL-MCNC: 116 MG/DL — HIGH (ref 70–99)
HCT VFR BLD CALC: 27.3 % — LOW (ref 39–50)
HGB BLD-MCNC: 9.2 G/DL — LOW (ref 13–17)
INR BLD: 1.96 RATIO — HIGH (ref 0.88–1.16)
MCHC RBC-ENTMCNC: 33.7 GM/DL — SIGNIFICANT CHANGE UP (ref 32–36)
MCHC RBC-ENTMCNC: 35.5 PG — HIGH (ref 27–34)
MCV RBC AUTO: 105.4 FL — HIGH (ref 80–100)
NRBC # BLD: 0 /100 WBCS — SIGNIFICANT CHANGE UP (ref 0–0)
PLATELET # BLD AUTO: 108 K/UL — LOW (ref 150–400)
POTASSIUM SERPL-MCNC: 3.6 MMOL/L — SIGNIFICANT CHANGE UP (ref 3.5–5.3)
POTASSIUM SERPL-SCNC: 3.6 MMOL/L — SIGNIFICANT CHANGE UP (ref 3.5–5.3)
PROT SERPL-MCNC: 6.4 G/DL — SIGNIFICANT CHANGE UP (ref 6–8.3)
PROTHROM AB SERPL-ACNC: 22.6 SEC — HIGH (ref 10–13.1)
RBC # BLD: 2.59 M/UL — LOW (ref 4.2–5.8)
RBC # FLD: 15 % — HIGH (ref 10.3–14.5)
SODIUM SERPL-SCNC: 135 MMOL/L — SIGNIFICANT CHANGE UP (ref 135–145)
SPECIMEN SOURCE: SIGNIFICANT CHANGE UP
SPECIMEN SOURCE: SIGNIFICANT CHANGE UP
WBC # BLD: 9.67 K/UL — SIGNIFICANT CHANGE UP (ref 3.8–10.5)
WBC # FLD AUTO: 9.67 K/UL — SIGNIFICANT CHANGE UP (ref 3.8–10.5)

## 2020-02-18 PROCEDURE — 99232 SBSQ HOSP IP/OBS MODERATE 35: CPT

## 2020-02-18 PROCEDURE — 99233 SBSQ HOSP IP/OBS HIGH 50: CPT

## 2020-02-18 RX ORDER — LACTULOSE 10 G/15ML
30 SOLUTION ORAL
Qty: 2700 | Refills: 0
Start: 2020-02-18 | End: 2020-03-18

## 2020-02-18 RX ORDER — PNEUMOCOCCAL 23-VAL P-SAC VAC 25MCG/0.5
0.5 VIAL (ML) INJECTION ONCE
Refills: 0 | Status: COMPLETED | OUTPATIENT
Start: 2020-02-18 | End: 2020-02-18

## 2020-02-18 RX ORDER — ALBUMIN HUMAN 25 %
50 VIAL (ML) INTRAVENOUS ONCE
Refills: 0 | Status: COMPLETED | OUTPATIENT
Start: 2020-02-18 | End: 2020-02-18

## 2020-02-18 RX ORDER — FUROSEMIDE 40 MG
1 TABLET ORAL
Qty: 30 | Refills: 0
Start: 2020-02-18 | End: 2020-03-18

## 2020-02-18 RX ORDER — ERTAPENEM SODIUM 1 G/1
1000 INJECTION, POWDER, LYOPHILIZED, FOR SOLUTION INTRAMUSCULAR; INTRAVENOUS EVERY 24 HOURS
Refills: 0 | Status: COMPLETED | OUTPATIENT
Start: 2020-02-18 | End: 2020-02-21

## 2020-02-18 RX ORDER — SPIRONOLACTONE 25 MG/1
50 TABLET, FILM COATED ORAL DAILY
Refills: 0 | Status: DISCONTINUED | OUTPATIENT
Start: 2020-02-18 | End: 2020-02-21

## 2020-02-18 RX ORDER — SPIRONOLACTONE 25 MG/1
1 TABLET, FILM COATED ORAL
Qty: 0 | Refills: 0 | DISCHARGE
Start: 2020-02-18

## 2020-02-18 RX ORDER — INSULIN LISPRO 100/ML
6 VIAL (ML) SUBCUTANEOUS
Refills: 0 | Status: DISCONTINUED | OUTPATIENT
Start: 2020-02-18 | End: 2020-02-21

## 2020-02-18 RX ORDER — ERTAPENEM SODIUM 1 G/1
1000 INJECTION, POWDER, LYOPHILIZED, FOR SOLUTION INTRAMUSCULAR; INTRAVENOUS
Qty: 0 | Refills: 0 | DISCHARGE
Start: 2020-02-18

## 2020-02-18 RX ORDER — PANTOPRAZOLE SODIUM 20 MG/1
1 TABLET, DELAYED RELEASE ORAL
Qty: 60 | Refills: 0

## 2020-02-18 RX ORDER — PANTOPRAZOLE SODIUM 20 MG/1
1 TABLET, DELAYED RELEASE ORAL
Qty: 60 | Refills: 0
Start: 2020-02-18 | End: 2020-03-18

## 2020-02-18 RX ORDER — FUROSEMIDE 40 MG
20 TABLET ORAL DAILY
Refills: 0 | Status: DISCONTINUED | OUTPATIENT
Start: 2020-02-18 | End: 2020-02-21

## 2020-02-18 RX ORDER — ERTAPENEM SODIUM 1 G/1
1000 INJECTION, POWDER, LYOPHILIZED, FOR SOLUTION INTRAMUSCULAR; INTRAVENOUS
Qty: 3000 | Refills: 0
Start: 2020-02-18 | End: 2020-02-20

## 2020-02-18 RX ADMIN — Medication 1: at 13:33

## 2020-02-18 RX ADMIN — Medication 6 UNIT(S): at 13:33

## 2020-02-18 RX ADMIN — LACTULOSE 20 GRAM(S): 10 SOLUTION ORAL at 05:28

## 2020-02-18 RX ADMIN — ERTAPENEM SODIUM 120 MILLIGRAM(S): 1 INJECTION, POWDER, LYOPHILIZED, FOR SOLUTION INTRAMUSCULAR; INTRAVENOUS at 16:12

## 2020-02-18 RX ADMIN — Medication 40 MILLIGRAM(S): at 05:27

## 2020-02-18 RX ADMIN — LACTULOSE 20 GRAM(S): 10 SOLUTION ORAL at 14:36

## 2020-02-18 RX ADMIN — INSULIN GLARGINE 12 UNIT(S): 100 INJECTION, SOLUTION SUBCUTANEOUS at 22:16

## 2020-02-18 RX ADMIN — ENOXAPARIN SODIUM 40 MILLIGRAM(S): 100 INJECTION SUBCUTANEOUS at 11:58

## 2020-02-18 RX ADMIN — MIDODRINE HYDROCHLORIDE 20 MILLIGRAM(S): 2.5 TABLET ORAL at 05:28

## 2020-02-18 RX ADMIN — Medication 6 UNIT(S): at 17:24

## 2020-02-18 RX ADMIN — Medication 1: at 17:24

## 2020-02-18 RX ADMIN — Medication 4 UNIT(S): at 09:01

## 2020-02-18 RX ADMIN — PANTOPRAZOLE SODIUM 40 MILLIGRAM(S): 20 TABLET, DELAYED RELEASE ORAL at 05:27

## 2020-02-18 RX ADMIN — Medication 1 MILLIGRAM(S): at 11:58

## 2020-02-18 RX ADMIN — Medication 0.5 MILLILITER(S): at 13:31

## 2020-02-18 RX ADMIN — SPIRONOLACTONE 25 MILLIGRAM(S): 25 TABLET, FILM COATED ORAL at 05:27

## 2020-02-18 RX ADMIN — Medication 50 MILLILITER(S): at 11:57

## 2020-02-18 RX ADMIN — MIDODRINE HYDROCHLORIDE 20 MILLIGRAM(S): 2.5 TABLET ORAL at 14:36

## 2020-02-18 RX ADMIN — Medication 1 TABLET(S): at 11:58

## 2020-02-18 RX ADMIN — MIDODRINE HYDROCHLORIDE 20 MILLIGRAM(S): 2.5 TABLET ORAL at 22:16

## 2020-02-18 RX ADMIN — PANTOPRAZOLE SODIUM 40 MILLIGRAM(S): 20 TABLET, DELAYED RELEASE ORAL at 17:24

## 2020-02-18 NOTE — PROGRESS NOTE ADULT - SUBJECTIVE AND OBJECTIVE BOX
Chief Complaint:  Patient is a 64y old  Male who presents with a chief complaint of Septic shock secondary to UTI (16 Feb 2020 11:56)      Interval Events: Pt had 4 BMs yesterday.   ROS: All 12 point system except listed above were otherwise negative.    Allergies:  codeine (Anaphylaxis)        Hospital Medications:  dextrose 40% Gel 15 Gram(s) Oral once PRN  dextrose 5%. 1000 milliLiter(s) IV Continuous <Continuous>  dextrose 50% Injectable 12.5 Gram(s) IV Push once  dextrose 50% Injectable 25 Gram(s) IV Push once  dextrose 50% Injectable 25 Gram(s) IV Push once  enoxaparin Injectable 40 milliGRAM(s) SubCutaneous daily  folic acid 1 milliGRAM(s) Oral daily  furosemide    Tablet 40 milliGRAM(s) Oral daily  glucagon  Injectable 1 milliGRAM(s) IntraMuscular once PRN  insulin glargine Injectable (LANTUS) 12 Unit(s) SubCutaneous at bedtime  insulin lispro (HumaLOG) corrective regimen sliding scale   SubCutaneous three times a day before meals  insulin lispro (HumaLOG) corrective regimen sliding scale   SubCutaneous at bedtime  insulin lispro Injectable (HumaLOG) 4 Unit(s) SubCutaneous three times a day before meals  lactulose Syrup 20 Gram(s) Oral three times a day  midodrine 20 milliGRAM(s) Oral every 8 hours  multivitamin 1 Tablet(s) Oral daily  pantoprazole    Tablet 40 milliGRAM(s) Oral two times a day  rifAXIMin 550 milliGRAM(s) Oral two times a day  spironolactone 25 milliGRAM(s) Oral daily      PMHX/PSHX:  GIB (gastrointestinal bleeding)  GERD with esophagitis  Hepatic encephalopathy  Obesity  Fatty liver disease, nonalcoholic  Renal stones  Hypertension  Neuropathy  Hypercholesteremia  Diabetes  S/P cholecystectomy  No significant past surgical history      Family history:  Family history of type 2 diabetes mellitus  Family history of hypertension  Family history of stomach cancer  No pertinent family history in first degree relatives    There is no family history of peptic ulcer disease, gastric cancer, colon polyps, colon cancer, celiac disease, biliary, hepatic, or pancreatic disease.  None of the female relatives have breast, uterine, or ovarian cancer.     ROS:   General:  No fevers, chills or night sweats  ENT:  No sore throat or dysphagia  CV:  No pain or palpitations  Resp:  No dyspnea, cough or  wheezing  GI:  as above  Skin:  No rash or edema  Neuro: no weakness   Hematologic: no bleeding  Musculoskeletal: no muscle pain or join pain  Psych: no agitation      PHYSICAL EXAM:   Vital Signs:  Vital Signs Last 24 Hrs  T(C): 36.7 (18 Feb 2020 05:20), Max: 36.8 (17 Feb 2020 21:11)  T(F): 98 (18 Feb 2020 05:20), Max: 98.3 (17 Feb 2020 21:11)  HR: 75 (18 Feb 2020 05:20) (75 - 85)  BP: 112/68 (18 Feb 2020 05:20) (90/56 - 112/68)  BP(mean): --  RR: 17 (18 Feb 2020 05:20) (17 - 18)  SpO2: 98% (18 Feb 2020 05:20) (96% - 99%)  Daily     Daily     GENERAL: NAD, well-developed  EYES: EOMI, PERRL, icteric sclera   NECK: Supple, No JVD  CHEST/LUNG: Clear to auscultation bilaterally; No wheezes rales or rhonchi  HEART: s1 s2 Regular rate and rhythm; No murmurs, rubs  ABDOMEN: Soft, Nontender, Nondistended; Bowel sounds present. No fluid wave appreciated.   EXTREMITIES:  trace edema b/l. Chronic venous changes. No clubbing noted   L midline catheter + c/d/i  PSYCH: AAOx3    LABS:                        9.2    9.67  )-----------( 108      ( 18 Feb 2020 06:52 )             27.3     Mean Cell Volume: 105.4 fl (02-18-20 @ 06:52)    02-18    135  |  97  |  19  ----------------------------<  116<H>  3.6   |  29  |  1.27    Ca    9.3      18 Feb 2020 06:49    TPro  6.4  /  Alb  2.8<L>  /  TBili  6.5<H>  /  DBili  x   /  AST  30  /  ALT  16  /  AlkPhos  144<H>  02-18    LIVER FUNCTIONS - ( 18 Feb 2020 06:49 )  Alb: 2.8 g/dL / Pro: 6.4 g/dL / ALK PHOS: 144 U/L / ALT: 16 U/L / AST: 30 U/L / GGT: x           PT/INR - ( 18 Feb 2020 07:49 )   PT: 22.6 sec;   INR: 1.96 ratio         PTT - ( 18 Feb 2020 07:49 )  PTT:41.4 sec                            9.2    9.67  )-----------( 108      ( 18 Feb 2020 06:52 )             27.3                         8.7    9.63  )-----------( 87       ( 17 Feb 2020 05:54 )             25.8                         8.9    9.74  )-----------( 97       ( 16 Feb 2020 06:29 )             26.3     Imaging: Chief Complaint:  Patient is a 64y old  Male who presents with a chief complaint of Septic shock secondary to UTI (16 Feb 2020 11:56)      Interval Events: Pt had 4 BMs yesterday. Pt denies nausea vomiting, abdominal pain.   ROS: All 12 point system except listed above were otherwise negative.    Allergies:  codeine (Anaphylaxis)        Hospital Medications:  dextrose 40% Gel 15 Gram(s) Oral once PRN  dextrose 5%. 1000 milliLiter(s) IV Continuous <Continuous>  dextrose 50% Injectable 12.5 Gram(s) IV Push once  dextrose 50% Injectable 25 Gram(s) IV Push once  dextrose 50% Injectable 25 Gram(s) IV Push once  enoxaparin Injectable 40 milliGRAM(s) SubCutaneous daily  folic acid 1 milliGRAM(s) Oral daily  furosemide    Tablet 40 milliGRAM(s) Oral daily  glucagon  Injectable 1 milliGRAM(s) IntraMuscular once PRN  insulin glargine Injectable (LANTUS) 12 Unit(s) SubCutaneous at bedtime  insulin lispro (HumaLOG) corrective regimen sliding scale   SubCutaneous three times a day before meals  insulin lispro (HumaLOG) corrective regimen sliding scale   SubCutaneous at bedtime  insulin lispro Injectable (HumaLOG) 4 Unit(s) SubCutaneous three times a day before meals  lactulose Syrup 20 Gram(s) Oral three times a day  midodrine 20 milliGRAM(s) Oral every 8 hours  multivitamin 1 Tablet(s) Oral daily  pantoprazole    Tablet 40 milliGRAM(s) Oral two times a day  rifAXIMin 550 milliGRAM(s) Oral two times a day  spironolactone 25 milliGRAM(s) Oral daily      PMHX/PSHX:  GIB (gastrointestinal bleeding)  GERD with esophagitis  Hepatic encephalopathy  Obesity  Fatty liver disease, nonalcoholic  Renal stones  Hypertension  Neuropathy  Hypercholesteremia  Diabetes  S/P cholecystectomy  No significant past surgical history      Family history:  Family history of type 2 diabetes mellitus  Family history of hypertension  Family history of stomach cancer  No pertinent family history in first degree relatives    There is no family history of peptic ulcer disease, gastric cancer, colon polyps, colon cancer, celiac disease, biliary, hepatic, or pancreatic disease.  None of the female relatives have breast, uterine, or ovarian cancer.     ROS:   General:  No fevers, chills or night sweats  ENT:  No sore throat or dysphagia  CV:  No pain or palpitations  Resp:  No dyspnea, cough or  wheezing  GI:  as above  Skin:  No rash or edema  Neuro: no weakness   Hematologic: no bleeding  Musculoskeletal: no muscle pain or join pain  Psych: no agitation      PHYSICAL EXAM:   Vital Signs:  Vital Signs Last 24 Hrs  T(C): 36.7 (18 Feb 2020 05:20), Max: 36.8 (17 Feb 2020 21:11)  T(F): 98 (18 Feb 2020 05:20), Max: 98.3 (17 Feb 2020 21:11)  HR: 75 (18 Feb 2020 05:20) (75 - 85)  BP: 112/68 (18 Feb 2020 05:20) (90/56 - 112/68)  BP(mean): --  RR: 17 (18 Feb 2020 05:20) (17 - 18)  SpO2: 98% (18 Feb 2020 05:20) (96% - 99%)  Daily     Daily     GENERAL: NAD, well-developed  EYES: EOMI, PERRL, icteric sclera   NECK: Supple, No JVD  CHEST/LUNG: Clear to auscultation bilaterally; No wheezes rales or rhonchi  HEART: s1 s2 Regular rate and rhythm; No murmurs, rubs  ABDOMEN: Soft, Nontender, Nondistended; Bowel sounds present. No fluid wave appreciated.   EXTREMITIES:  trace edema b/l. Chronic venous changes. No clubbing noted   L midline catheter + c/d/i  PSYCH: AAOx3    LABS:                        9.2    9.67  )-----------( 108      ( 18 Feb 2020 06:52 )             27.3     Mean Cell Volume: 105.4 fl (02-18-20 @ 06:52)    02-18    135  |  97  |  19  ----------------------------<  116<H>  3.6   |  29  |  1.27    Ca    9.3      18 Feb 2020 06:49    TPro  6.4  /  Alb  2.8<L>  /  TBili  6.5<H>  /  DBili  x   /  AST  30  /  ALT  16  /  AlkPhos  144<H>  02-18    LIVER FUNCTIONS - ( 18 Feb 2020 06:49 )  Alb: 2.8 g/dL / Pro: 6.4 g/dL / ALK PHOS: 144 U/L / ALT: 16 U/L / AST: 30 U/L / GGT: x           PT/INR - ( 18 Feb 2020 07:49 )   PT: 22.6 sec;   INR: 1.96 ratio         PTT - ( 18 Feb 2020 07:49 )  PTT:41.4 sec                            9.2    9.67  )-----------( 108      ( 18 Feb 2020 06:52 )             27.3                         8.7    9.63  )-----------( 87       ( 17 Feb 2020 05:54 )             25.8                         8.9    9.74  )-----------( 97       ( 16 Feb 2020 06:29 )             26.3     Imaging:

## 2020-02-18 NOTE — PROGRESS NOTE ADULT - PROBLEM SELECTOR PLAN 3
MELD=29  -2/2 to JEAN cirrhosis   -continue with lasix 40mg daily /spironolactone 25mg daily for ascites  - small ascites on US not sufficient for safe paracentesis  -encephalopathy: mental status at baseline, patient is on lactulose and rifaximin at home.   -varices: last EGD showed small varices and GAVE on 12/30  -ascites: trace on CT/exam. History of SBP per chart but not found in [facility]. On lasix/aldactone 40/100 at home. No Abx PPX  -HCC not seen on most recent CT.

## 2020-02-18 NOTE — CHART NOTE - NSCHARTNOTEFT_GEN_A_CORE
Nutrition follow up     Hospital course as per chart: Pt 65 y/o M with PMH: T2DM, dyslipidemia, GERD, HFpEF, decompensated JEAN cirrhosis, hepatic encephalopathy, GIB, UNOS listed for liver transplant (current MELD 29), presents with confusion, weakness, after a fall at home, found to be in septic shock secondary to 2/2 E. coli bacteremia.     Source: Patient [x]    Family [ ]     other [x]; Medical record, RN     Pt reports good appetite but decreased PO intake due to food dislike with multiple complaints - reviewed menu order procedures. RN reports pt consumes 100% meals. Offered nutritional supplement - pt refused at this time, states "I will order from [employer]". Denies difficulty chewing/swallowing. Pt denies nausea, vomiting, diarrhea, or constipation, last BM today (02/18). Noted pt received education on T2DM nutrition therapy as per previous RD note - pt denies having questions/concerns about diet and nutrition education provided.      Diet: Consistent Carbohydrate no snack + low salt + 1500 ml fluid restriction     Enteral /Parenteral Nutrition: n/a    Current Weight: (02/11) 251.3 pounds - will continue to monitor (pt sitting at this time).   % Weight Change: n/a    Pertinent Medications: MEDICATIONS  (STANDING):  dextrose 5%. 1000 milliLiter(s) (50 mL/Hr) IV Continuous <Continuous>  dextrose 50% Injectable 12.5 Gram(s) IV Push once  dextrose 50% Injectable 25 Gram(s) IV Push once  dextrose 50% Injectable 25 Gram(s) IV Push once  enoxaparin Injectable 40 milliGRAM(s) SubCutaneous daily  ertapenem  IVPB 1000 milliGRAM(s) IV Intermittent every 24 hours  folic acid 1 milliGRAM(s) Oral daily  furosemide    Tablet 20 milliGRAM(s) Oral daily  insulin glargine Injectable (LANTUS) 12 Unit(s) SubCutaneous at bedtime  insulin lispro (HumaLOG) corrective regimen sliding scale   SubCutaneous three times a day before meals  insulin lispro (HumaLOG) corrective regimen sliding scale   SubCutaneous at bedtime  insulin lispro Injectable (HumaLOG) 6 Unit(s) SubCutaneous three times a day with meals  lactulose Syrup 20 Gram(s) Oral three times a day  midodrine 20 milliGRAM(s) Oral every 8 hours  multivitamin 1 Tablet(s) Oral daily  pantoprazole    Tablet 40 milliGRAM(s) Oral two times a day  rifAXIMin 550 milliGRAM(s) Oral two times a day  spironolactone 50 milliGRAM(s) Oral daily    MEDICATIONS  (PRN):  dextrose 40% Gel 15 Gram(s) Oral once PRN Blood Glucose LESS THAN 70 milliGRAM(s)/deciliter  glucagon  Injectable 1 milliGRAM(s) IntraMuscular once PRN Glucose LESS THAN 70 milligrams/deciliter    Pertinent Labs: (02/18) Glu 116 mg/dL<H>   Finger sticks: (02/18) 113 - 200 (02/17) 128 - 288   (02/12) [id number] 6.4 %    Skin: no noted pressure injuries as per documentation.   No noted edema as per flow sheets at this time (previously with +1 generalized edema).     Estimated Needs:   [x] no change since previous assessment  [ ] recalculated:     Previous Nutrition Diagnoses:   [x] Increased Nutrient Needs   [x] Food & Nutrition Related Knowledge Deficit     Nutrition Diagnoses are [x] ongoing - being addressed with PO intake encouragement, reviewed menu order procedures, and nutrition therapy education.   Goals: pt to meet >80% estimated nutrient needs via tolerated route and to teach back 2 points of nutrition education.     New Nutrition Diagnosis: [x] not applicable    Interventions:     1. Recommend continue Consistent Carbohydrate + low salt diet + 1500 ml fluid restriction (defer fluids to team). Will continue to monitor and adjust as needed.   2. Encourage PO intake and obtain food preferences (obtained at this time) - reviewed menu order procedures.   3. Continue Multivitamin to optimize nutrient intake in setting of liver cirrhosis.   4. Review nutrition therapy education as needed/requested.   5. Obtain current weight to identify changes if any.     Monitoring and Evaluation:     [x] PO intake [x] Tolerance to diet prescription [x] weights [x] follow up per protocol    [x] other: needs for further education    RD remains available.  Josefa Campbell MS RDN CDN #[phone]. Nutrition follow up     Hospital course as per chart: Pt 63 y/o M with PMH: T2DM, dyslipidemia, GERD, HFpEF, decompensated JEAN cirrhosis, hepatic encephalopathy, GIB, UNOS listed for liver transplant (current MELD 29), presents with confusion, weakness, after a fall at home, found to be in septic shock secondary to 2/2 E. coli bacteremia.     Source: Patient [x]    Family [ ]     other [x]; Medical record, RN     Pt reports good appetite but decreased PO intake due to food dislike with multiple complains - reviewed menu order procedures. RN reports pt consumes 100% meals. Offered nutritional supplement - pt refused at this time, states "I will order from [employer]". Denies difficulty chewing/swallowing. Pt denies nausea, vomiting, diarrhea, or constipation, last BM today (02/18). Noted pt received education on T2DM nutrition therapy as per previous RD note - pt denies having questions/concerns about diet and nutrition education provided.      Diet: Consistent Carbohydrate no snack + low salt + 1500 ml fluid restriction     Enteral /Parenteral Nutrition: n/a    Current Weight: (02/11) 251.3 pounds - will continue to monitor (pt sitting at this time).   % Weight Change: n/a    Pertinent Medications: MEDICATIONS  (STANDING):  dextrose 5%. 1000 milliLiter(s) (50 mL/Hr) IV Continuous <Continuous>  dextrose 50% Injectable 12.5 Gram(s) IV Push once  dextrose 50% Injectable 25 Gram(s) IV Push once  dextrose 50% Injectable 25 Gram(s) IV Push once  enoxaparin Injectable 40 milliGRAM(s) SubCutaneous daily  ertapenem  IVPB 1000 milliGRAM(s) IV Intermittent every 24 hours  folic acid 1 milliGRAM(s) Oral daily  furosemide    Tablet 20 milliGRAM(s) Oral daily  insulin glargine Injectable (LANTUS) 12 Unit(s) SubCutaneous at bedtime  insulin lispro (HumaLOG) corrective regimen sliding scale   SubCutaneous three times a day before meals  insulin lispro (HumaLOG) corrective regimen sliding scale   SubCutaneous at bedtime  insulin lispro Injectable (HumaLOG) 6 Unit(s) SubCutaneous three times a day with meals  lactulose Syrup 20 Gram(s) Oral three times a day  midodrine 20 milliGRAM(s) Oral every 8 hours  multivitamin 1 Tablet(s) Oral daily  pantoprazole    Tablet 40 milliGRAM(s) Oral two times a day  rifAXIMin 550 milliGRAM(s) Oral two times a day  spironolactone 50 milliGRAM(s) Oral daily    MEDICATIONS  (PRN):  dextrose 40% Gel 15 Gram(s) Oral once PRN Blood Glucose LESS THAN 70 milliGRAM(s)/deciliter  glucagon  Injectable 1 milliGRAM(s) IntraMuscular once PRN Glucose LESS THAN 70 milligrams/deciliter    Pertinent Labs: (02/18) Glu 116 mg/dL<H>   Finger sticks: (02/18) 113 - 200 (02/17) 128 - 288   (02/12) [id number] 6.4 %    Skin: no noted pressure injuries as per documentation.   No noted edema as per flow sheets at this time (previously with +1 generalized edema).     Estimated Needs:   [x] no change since previous assessment  [ ] recalculated:     Previous Nutrition Diagnoses:   [x] Increased Nutrient Needs   [x] Food & Nutrition Related Knowledge Deficit     Nutrition Diagnoses are [x] ongoing - being addressed with PO intake encouragement, reviewed menu order procedures, and nutrition therapy education.   Goals: pt to meet >80% estimated nutrient needs via tolerated route and to teach back 2 points of nutrition education.     New Nutrition Diagnosis: [x] not applicable    Interventions:     1. Recommend continue Consistent Carbohydrate + low salt diet + 1500 ml fluid restriction (defer fluids to team). Will continue to monitor and adjust as needed.   2. Encourage PO intake and obtain food preferences (obtained at this time) - reviewed menu order procedures.   3. Continue Multivitamin to optimize nutrient intake in setting of liver cirrhosis.   4. Review nutrition therapy education as needed/requested.   5. Obtain current weight to identify changes if any.     Monitoring and Evaluation:     [x] PO intake [x] Tolerance to diet prescription [x] weights [x] follow up per protocol    [x] other: needs for further education    RD remains available.  Josefa Campbell MS RDN CDN #[phone].

## 2020-02-18 NOTE — PROGRESS NOTE ADULT - ATTENDING COMMENTS
Hepatology Staff: Yomi Medina MD    I saw and examined the patient along with  Dr. Baker  02-18-20 @ 11:23.    Vitals: Vital Signs Last 24 Hrs  T(C): 36.7 (18 Feb 2020 05:20), Max: 36.8 (17 Feb 2020 21:11)  T(F): 98 (18 Feb 2020 05:20), Max: 98.3 (17 Feb 2020 21:11)  HR: 75 (18 Feb 2020 05:20) (75 - 85)  BP: 112/68 (18 Feb 2020 05:20) (90/56 - 112/68)    RR: 17 (18 Feb 2020 05:20) (17 - 18)  SpO2: 98% (18 Feb 2020 05:20) (96% - 99%)    Antibiotics:ertapenem  IVPB 1000 milliGRAM(s) IV Intermittent every 24 hours    Diuretics: Lasix 40 and Aldactone 25.  Labs:INR: 1.96 ratio (02-18-20 @ 07:49)  Creatinine, Serum: 1.27 mg/dL (02-18-20 @ 06:49)  Bilirubin Total, Serum: 6.5 mg/dL (02-18-20 @ 06:49)    MELD Score: 25  Imaging Studies:  I/O: I&O's Summary    17 Feb 2020 07:01  -  18 Feb 2020 07:00  --------------------------------------------------------  IN: 568 mL / OUT: 1000 mL / NET: -432 mL      Nutritional Status:   Albumin, Serum: 2.8 g/dL (02-18-20 @ 06:49)    Last 24 hour events: None    Recommendations: Keep Lasix 20 mg daily, and Aldactone 50 mg. Keep on Ertapenem as planned by ID. Should go home with arrangement for home nurse to give antibiotics as outpatient. OT/PT to continue. No need for outpatient/inpatient rehab at this time. Dwayne will need a home health aid ( please review with SW).   Will recommend IV Albumin 25% 50 g today.  Agree with the rest of the plan as outlined by Dr. Baker.  Keep patient listed on the UNOS wait-list.    Plan discussed with Primary team.

## 2020-02-18 NOTE — PROGRESS NOTE ADULT - ASSESSMENT
Pt is a 64M with IDDM, dyslipidemia, obesity, GERD, HFpEF with mild LV diastolic dysfunction, and decompensated JEAN cirrhosis complicated by ascites, history of SBP, hepatic encephalopathy, and chronic anemia with a history of duodenal ulcer as well as GAVE and duodenal AVM s/p APC (last on 10/11/19), who is UNOS listed for liver transplantation at [facility] who presents with confusion, weakness, and fall at home found to be in septic shock 2/2 e. coli bacteremia

## 2020-02-18 NOTE — PROGRESS NOTE ADULT - ATTENDING COMMENTS
Hepatology Staff: Yomi Medina MD    I saw and examined the patient along with  Dr. Baker  02-19-20.    Vitals: Vital Signs Last 24 Hrs  T(C): 36.6 (19 Feb 2020 05:11), Max: 36.7 (18 Feb 2020 14:12)  T(F): 97.9 (19 Feb 2020 05:11), Max: 98 (18 Feb 2020 14:12)  HR: 76 (19 Feb 2020 05:11) (76 - 83)  BP: 108/63 (19 Feb 2020 05:11) (108/63 - 110/62)  RR: 18 (19 Feb 2020 05:11) (18 - 18)  SpO2: 98% (19 Feb 2020 05:11) (98% - 100%)    IV Fluids:   Antibiotics:ertapenem  IVPB 1000 milliGRAM(s) IV Intermittent every 24 hours    Diuretics:  Labs:INR: 1.93 ratio (02-19-20 @ 08:41)  Creatinine, Serum: 1.20 mg/dL (02-19-20 @ 06:52)  Bilirubin Total, Serum: 6.4 mg/dL (02-19-20 @ 06:52)    MELD Score: 24    I/O: I&O's Summary    18 Feb 2020 07:01  -  19 Feb 2020 07:00  --------------------------------------------------------  IN: 940 mL / OUT: 2120 mL / NET: -1180 mL      Nutritional Status:   Albumin, Serum: 2.7 g/dL (02-19-20 @ 06:52)    Last 24 hour events: Stable.    Recommendations: Awaiting disposition after arrangement for home antibiotics. Continue with current management.      Plan discussed with Primary team.

## 2020-02-18 NOTE — PROGRESS NOTE ADULT - SUBJECTIVE AND OBJECTIVE BOX
[facility] Division of Hospital Medicine  Eric Hernandez DO  Pager (M-F, 0E-9B): [phone]  Other Times:  [phone]    Patient is a 64y old  Male who presents with a chief complaint of Septic shock secondary to UTI (16 Feb 2020 11:56)      SUBJECTIVE / OVERNIGHT EVENTS:    patient seen and examined at bedside.  feels ok. wants to go home.     MEDICATIONS  (STANDING):  albumin human 25% IVPB 50 milliLiter(s) IV Intermittent once  dextrose 5%. 1000 milliLiter(s) (50 mL/Hr) IV Continuous <Continuous>  dextrose 50% Injectable 12.5 Gram(s) IV Push once  dextrose 50% Injectable 25 Gram(s) IV Push once  dextrose 50% Injectable 25 Gram(s) IV Push once  enoxaparin Injectable 40 milliGRAM(s) SubCutaneous daily  ertapenem  IVPB 1000 milliGRAM(s) IV Intermittent every 24 hours  folic acid 1 milliGRAM(s) Oral daily  furosemide    Tablet 40 milliGRAM(s) Oral daily  insulin glargine Injectable (LANTUS) 12 Unit(s) SubCutaneous at bedtime  insulin lispro (HumaLOG) corrective regimen sliding scale   SubCutaneous three times a day before meals  insulin lispro (HumaLOG) corrective regimen sliding scale   SubCutaneous at bedtime  insulin lispro Injectable (HumaLOG) 6 Unit(s) SubCutaneous three times a day with meals  lactulose Syrup 20 Gram(s) Oral three times a day  midodrine 20 milliGRAM(s) Oral every 8 hours  multivitamin 1 Tablet(s) Oral daily  pantoprazole    Tablet 40 milliGRAM(s) Oral two times a day  pneumococcal  23 Vaccine (PNEUMOVAX) 0.5 milliLiter(s) IntraMuscular once  rifAXIMin 550 milliGRAM(s) Oral two times a day  spironolactone 25 milliGRAM(s) Oral daily    MEDICATIONS  (PRN):  dextrose 40% Gel 15 Gram(s) Oral once PRN Blood Glucose LESS THAN 70 milliGRAM(s)/deciliter  glucagon  Injectable 1 milliGRAM(s) IntraMuscular once PRN Glucose LESS THAN 70 milligrams/deciliter      CAPILLARY BLOOD GLUCOSE      POCT Blood Glucose.: 113 mg/dL (18 Feb 2020 08:01)  POCT Blood Glucose.: 242 mg/dL (17 Feb 2020 21:33)  POCT Blood Glucose.: 212 mg/dL (17 Feb 2020 17:41)  POCT Blood Glucose.: 288 mg/dL (17 Feb 2020 11:58)    I&O's Summary    17 Feb 2020 07:01  -  18 Feb 2020 07:00  --------------------------------------------------------  IN: 568 mL / OUT: 1000 mL / NET: -432 mL        PHYSICAL EXAM:  Vital Signs Last 24 Hrs  T(C): 36.7 (18 Feb 2020 05:20), Max: 36.8 (17 Feb 2020 21:11)  T(F): 98 (18 Feb 2020 05:20), Max: 98.3 (17 Feb 2020 21:11)  HR: 75 (18 Feb 2020 05:20) (75 - 85)  BP: 112/68 (18 Feb 2020 05:20) (90/56 - 112/68)  BP(mean): --  RR: 17 (18 Feb 2020 05:20) (17 - 18)  SpO2: 98% (18 Feb 2020 05:20) (96% - 99%)      PHYSICAL EXAM:  GENERAL: NAD, well-developed  EYES: EOMI, PERRL, icteric sclera   NECK: Supple, No JVD  CHEST/LUNG: Clear to auscultation bilaterally; No wheezes rales or rhonchi  HEART: s1 s2 Regular rate and rhythm; No murmurs, rubs  ABDOMEN: Soft, Nontender, Nondistended; Bowel sounds present. No fluid wave appreciated.   EXTREMITIES:  trace edema b/l. Chronic venous changes. No clubbing noted   L midline catheter + c/d/i  PSYCH: AAOx3    LABS:                        9.2    9.67  )-----------( 108      ( 18 Feb 2020 06:52 )             27.3     02-18    135  |  97  |  19  ----------------------------<  116<H>  3.6   |  29  |  1.27    Ca    9.3      18 Feb 2020 06:49    TPro  6.4  /  Alb  2.8<L>  /  TBili  6.5<H>  /  DBili  x   /  AST  30  /  ALT  16  /  AlkPhos  144<H>  02-18    PT/INR - ( 18 Feb 2020 07:49 )   PT: 22.6 sec;   INR: 1.96 ratio         PTT - ( 18 Feb 2020 07:49 )  PTT:41.4 sec            RADIOLOGY & ADDITIONAL TESTS:  Results Reviewed:   Imaging Personally Reviewed:  Electrocardiogram Personally Reviewed:    COORDINATION OF CARE:  Care Discussed with Consultants/Other Providers [Y/N]:  Prior or Outpatient Records Reviewed [Y/N]:  ZBIGNIEW Pratt

## 2020-02-18 NOTE — PHARMACOTHERAPY INTERVENTION NOTE - COMMENTS
63 yo M with DM A1C 6.4 on lantus 24 units SQ HS and humalog 8 units SQ TID per med list. Currently on lantus 12 units SQ HS with low correctional scale. BG in past 24 hours were 144, 319, 246, 176. Recommend adding 4 units SQ TID.    Luis Cardenas, PharmD, BCPS  [phone]
65 yo M with DM A1C 6.4 on lantus 24 units SQ HS and humalog 8 units SQ TID per med list. Currently on lantus 12 units SQ HS, humalog 4 units SQ TID with low correctional scale. BG in past 24 hours were 113, 242, 212, 288, 128. Would recommend increasing humalog to 6 units SQ TID.    Luis Cardenas, PharmD, BCPS  [phone]
Ertapenem had auto-discontinued after 6 days of therapy on 2/17 - tentative plan to compete 10 day course until 2/21. Recommend resuming ertapenem 1 g IV Q24H x 4 doses.    Luis Cardenas, PharmD, BCPS  [phone]

## 2020-02-18 NOTE — PROGRESS NOTE ADULT - PROBLEM SELECTOR PLAN 1
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

## 2020-02-18 NOTE — PROGRESS NOTE ADULT - ASSESSMENT
Impression:     # Septic shock 2/2 Ecoli bacteremia, from urinary source. Urine culture now growing Ecoli ESBL, repeat blood cultures negative.   # Decompensated JEAN Cirrhosis, UNOS listed. Current MELD Na 25 02/16/2020 (same baseline of 25).   -encephalopathy: mental status at baseline, patient is on lactulose and rifaximin at home.   -varices: last EGD showed small varices and GAVE on 12/30  -ascites: trace on CT/exam. History of SBP per chart but not found in [place]. On lasix/aldactone 40/100 at home. No Abx PPX  -HCC not seen on most recent CT.     # DM    Recommendation:  - continue with ertapenem as per ID ending 2/21  - continue with lasix 40mg daily /spironolactone 25mg daily for ascites  - continue with midodrine 20mg q8hr  - continue with lactulose 20mg tid 3-4 bowel movements and rifaximin.   - small ascites on US not sufficient for safe paracentesis  - Encephalopathy improved  - DVT ppx with lovenox  - continue fluid restriction (1.5L) Impression:     # Septic shock 2/2 Ecoli bacteremia, from urinary source. Urine culture now growing Ecoli ESBL, repeat blood cultures negative.   # Decompensated JEAN Cirrhosis, UNOS listed. Current MELD Na 25 02/16/2020 (same baseline of 25).   -encephalopathy: mental status at baseline, patient is on lactulose and rifaximin at home.   -varices: last EGD showed small varices and GAVE on 12/30  -ascites: trace on CT/exam. History of SBP per chart but not found in [place]. On lasix/aldactone 40/100 at home. No Abx PPX  -HCC not seen on most recent CT.     # DM    Recommendation:  - continue with ertapenem as per ID ending 2/21  - continue with lasix 40mg daily /spironolactone 25mg daily for ascites  - continue with midodrine 20mg q8hr  - continue with lactulose 20mg tid 3-4 bowel movements and rifaximin.   - small ascites on US not sufficient for safe paracentesis  - Encephalopathy improved  - DVT ppx with lovenox  - continue fluid restriction (1.5L)  - DC planning  - Give Albumin 50g 25% x1 given slight increase in Cr Impression:     # Septic shock 2/2 Ecoli bacteremia, from urinary source. Urine culture now growing Ecoli ESBL, repeat blood cultures negative.   # Decompensated JEAN Cirrhosis, UNOS listed. Current MELD Na 25 02/18/2020 (same baseline of 25).   -encephalopathy: mental status at baseline, patient is on lactulose and rifaximin at home.   -varices: last EGD showed small varices and GAVE on 12/30  -ascites: trace on CT/exam. History of SBP per chart but not found in [place]. On lasix/aldactone 40/100 at home. No Abx PPX  -HCC not seen on most recent CT.     # DM    Recommendation:  - continue with ertapenem as per ID ending 2/21  - continue with lasix 40mg daily /spironolactone 25mg daily for ascites  - continue with midodrine 20mg q8hr  - continue with lactulose 20mg tid 3-4 bowel movements and rifaximin.   - small ascites on US not sufficient for safe paracentesis  - Encephalopathy improved  - DVT ppx with lovenox  - continue fluid restriction (1.5L)  - DC planning  - Give Albumin 50g 25% x1 given slight increase in Cr

## 2020-02-19 LAB
ALBUMIN SERPL ELPH-MCNC: 2.7 G/DL — LOW (ref 3.3–5)
ALP SERPL-CCNC: 145 U/L — HIGH (ref 40–120)
ALT FLD-CCNC: 12 U/L — SIGNIFICANT CHANGE UP (ref 10–45)
ANION GAP SERPL CALC-SCNC: 11 MMOL/L — SIGNIFICANT CHANGE UP (ref 5–17)
APTT BLD: 43.3 SEC — HIGH (ref 27.5–36.3)
AST SERPL-CCNC: 27 U/L — SIGNIFICANT CHANGE UP (ref 10–40)
BILIRUB SERPL-MCNC: 6.4 MG/DL — HIGH (ref 0.2–1.2)
BUN SERPL-MCNC: 21 MG/DL — SIGNIFICANT CHANGE UP (ref 7–23)
CALCIUM SERPL-MCNC: 9.4 MG/DL — SIGNIFICANT CHANGE UP (ref 8.4–10.5)
CHLORIDE SERPL-SCNC: 98 MMOL/L — SIGNIFICANT CHANGE UP (ref 96–108)
CO2 SERPL-SCNC: 27 MMOL/L — SIGNIFICANT CHANGE UP (ref 22–31)
CREAT SERPL-MCNC: 1.2 MG/DL — SIGNIFICANT CHANGE UP (ref 0.5–1.3)
GLUCOSE BLDC GLUCOMTR-MCNC: 131 MG/DL — HIGH (ref 70–99)
GLUCOSE BLDC GLUCOMTR-MCNC: 150 MG/DL — HIGH (ref 70–99)
GLUCOSE BLDC GLUCOMTR-MCNC: 168 MG/DL — HIGH (ref 70–99)
GLUCOSE BLDC GLUCOMTR-MCNC: 227 MG/DL — HIGH (ref 70–99)
GLUCOSE BLDC GLUCOMTR-MCNC: 262 MG/DL — HIGH (ref 70–99)
GLUCOSE SERPL-MCNC: 156 MG/DL — HIGH (ref 70–99)
HCT VFR BLD CALC: 26.5 % — LOW (ref 39–50)
HGB BLD-MCNC: 8.7 G/DL — LOW (ref 13–17)
INR BLD: 1.93 RATIO — HIGH (ref 0.88–1.16)
MCHC RBC-ENTMCNC: 32.8 GM/DL — SIGNIFICANT CHANGE UP (ref 32–36)
MCHC RBC-ENTMCNC: 35.1 PG — HIGH (ref 27–34)
MCV RBC AUTO: 106.9 FL — HIGH (ref 80–100)
NRBC # BLD: 0 /100 WBCS — SIGNIFICANT CHANGE UP (ref 0–0)
PLATELET # BLD AUTO: 97 K/UL — LOW (ref 150–400)
POTASSIUM SERPL-MCNC: 3.7 MMOL/L — SIGNIFICANT CHANGE UP (ref 3.5–5.3)
POTASSIUM SERPL-SCNC: 3.7 MMOL/L — SIGNIFICANT CHANGE UP (ref 3.5–5.3)
PROT SERPL-MCNC: 6.3 G/DL — SIGNIFICANT CHANGE UP (ref 6–8.3)
PROTHROM AB SERPL-ACNC: 22.7 SEC — HIGH (ref 10–13.1)
RBC # BLD: 2.48 M/UL — LOW (ref 4.2–5.8)
RBC # FLD: 15 % — HIGH (ref 10.3–14.5)
SODIUM SERPL-SCNC: 136 MMOL/L — SIGNIFICANT CHANGE UP (ref 135–145)
SODIUM UR-SCNC: 43 MMOL/L — SIGNIFICANT CHANGE UP
WBC # BLD: 7.3 K/UL — SIGNIFICANT CHANGE UP (ref 3.8–10.5)
WBC # FLD AUTO: 7.3 K/UL — SIGNIFICANT CHANGE UP (ref 3.8–10.5)

## 2020-02-19 PROCEDURE — 99232 SBSQ HOSP IP/OBS MODERATE 35: CPT

## 2020-02-19 PROCEDURE — 99233 SBSQ HOSP IP/OBS HIGH 50: CPT

## 2020-02-19 RX ADMIN — MIDODRINE HYDROCHLORIDE 20 MILLIGRAM(S): 2.5 TABLET ORAL at 22:09

## 2020-02-19 RX ADMIN — LACTULOSE 20 GRAM(S): 10 SOLUTION ORAL at 22:09

## 2020-02-19 RX ADMIN — LACTULOSE 20 GRAM(S): 10 SOLUTION ORAL at 06:07

## 2020-02-19 RX ADMIN — Medication 6 UNIT(S): at 09:31

## 2020-02-19 RX ADMIN — ERTAPENEM SODIUM 120 MILLIGRAM(S): 1 INJECTION, POWDER, LYOPHILIZED, FOR SOLUTION INTRAMUSCULAR; INTRAVENOUS at 15:43

## 2020-02-19 RX ADMIN — SPIRONOLACTONE 50 MILLIGRAM(S): 25 TABLET, FILM COATED ORAL at 06:07

## 2020-02-19 RX ADMIN — ENOXAPARIN SODIUM 40 MILLIGRAM(S): 100 INJECTION SUBCUTANEOUS at 13:03

## 2020-02-19 RX ADMIN — Medication 20 MILLIGRAM(S): at 06:07

## 2020-02-19 RX ADMIN — INSULIN GLARGINE 12 UNIT(S): 100 INJECTION, SOLUTION SUBCUTANEOUS at 22:07

## 2020-02-19 RX ADMIN — MIDODRINE HYDROCHLORIDE 20 MILLIGRAM(S): 2.5 TABLET ORAL at 13:03

## 2020-02-19 RX ADMIN — Medication 1: at 13:04

## 2020-02-19 RX ADMIN — Medication 1 TABLET(S): at 13:03

## 2020-02-19 RX ADMIN — Medication 1: at 22:07

## 2020-02-19 RX ADMIN — MIDODRINE HYDROCHLORIDE 20 MILLIGRAM(S): 2.5 TABLET ORAL at 06:07

## 2020-02-19 RX ADMIN — LACTULOSE 20 GRAM(S): 10 SOLUTION ORAL at 13:03

## 2020-02-19 RX ADMIN — PANTOPRAZOLE SODIUM 40 MILLIGRAM(S): 20 TABLET, DELAYED RELEASE ORAL at 06:07

## 2020-02-19 RX ADMIN — Medication 6 UNIT(S): at 18:03

## 2020-02-19 RX ADMIN — PANTOPRAZOLE SODIUM 40 MILLIGRAM(S): 20 TABLET, DELAYED RELEASE ORAL at 18:03

## 2020-02-19 RX ADMIN — Medication 6 UNIT(S): at 13:04

## 2020-02-19 RX ADMIN — Medication 1 MILLIGRAM(S): at 13:03

## 2020-02-19 RX ADMIN — Medication 2: at 18:03

## 2020-02-19 NOTE — PROGRESS NOTE ADULT - SUBJECTIVE AND OBJECTIVE BOX
[facility] Division of Hospital Medicine  Eric Hernandez DO  Pager (KERRY-F, 7G-3W): [phone]  Other Times:  [phone]    Patient is a 64y old  Male who presents with a chief complaint of Septic shock secondary to UTI (16 Feb 2020 11:56)      SUBJECTIVE / OVERNIGHT EVENTS:    patient seen and examined at bedside. feels well.   no complaints.    MEDICATIONS  (STANDING):  dextrose 5%. 1000 milliLiter(s) (50 mL/Hr) IV Continuous <Continuous>  dextrose 50% Injectable 12.5 Gram(s) IV Push once  dextrose 50% Injectable 25 Gram(s) IV Push once  dextrose 50% Injectable 25 Gram(s) IV Push once  enoxaparin Injectable 40 milliGRAM(s) SubCutaneous daily  ertapenem  IVPB 1000 milliGRAM(s) IV Intermittent every 24 hours  folic acid 1 milliGRAM(s) Oral daily  furosemide    Tablet 20 milliGRAM(s) Oral daily  insulin glargine Injectable (LANTUS) 12 Unit(s) SubCutaneous at bedtime  insulin lispro (HumaLOG) corrective regimen sliding scale   SubCutaneous three times a day before meals  insulin lispro (HumaLOG) corrective regimen sliding scale   SubCutaneous at bedtime  insulin lispro Injectable (HumaLOG) 6 Unit(s) SubCutaneous three times a day with meals  lactulose Syrup 20 Gram(s) Oral three times a day  midodrine 20 milliGRAM(s) Oral every 8 hours  multivitamin 1 Tablet(s) Oral daily  pantoprazole    Tablet 40 milliGRAM(s) Oral two times a day  rifAXIMin 550 milliGRAM(s) Oral two times a day  spironolactone 50 milliGRAM(s) Oral daily    MEDICATIONS  (PRN):  dextrose 40% Gel 15 Gram(s) Oral once PRN Blood Glucose LESS THAN 70 milliGRAM(s)/deciliter  glucagon  Injectable 1 milliGRAM(s) IntraMuscular once PRN Glucose LESS THAN 70 milligrams/deciliter      CAPILLARY BLOOD GLUCOSE      POCT Blood Glucose.: 131 mg/dL (19 Feb 2020 09:30)  POCT Blood Glucose.: 150 mg/dL (19 Feb 2020 07:36)  POCT Blood Glucose.: 246 mg/dL (18 Feb 2020 22:03)  POCT Blood Glucose.: 179 mg/dL (18 Feb 2020 17:17)  POCT Blood Glucose.: 200 mg/dL (18 Feb 2020 13:17)    I&O's Summary    18 Feb 2020 07:01  -  19 Feb 2020 07:00  --------------------------------------------------------  IN: 940 mL / OUT: 2120 mL / NET: -1180 mL        PHYSICAL EXAM:  Vital Signs Last 24 Hrs  T(C): 36.6 (19 Feb 2020 05:11), Max: 36.7 (18 Feb 2020 14:12)  T(F): 97.9 (19 Feb 2020 05:11), Max: 98 (18 Feb 2020 14:12)  HR: 76 (19 Feb 2020 05:11) (76 - 83)  BP: 108/63 (19 Feb 2020 05:11) (108/63 - 110/62)  BP(mean): --  RR: 18 (19 Feb 2020 05:11) (18 - 18)  SpO2: 98% (19 Feb 2020 05:11) (98% - 100%)    PHYSICAL EXAM:  GENERAL: NAD, well-developed  EYES: EOMI, PERRL, icteric sclera   NECK: Supple, No JVD  CHEST/LUNG: Clear to auscultation bilaterally; No wheezes rales or rhonchi  HEART: s1 s2 Regular rate and rhythm; No murmurs, rubs  ABDOMEN: Soft, Nontender, Nondistended; Bowel sounds present. No fluid wave appreciated.   EXTREMITIES:  trace edema b/l. Chronic venous changes. No clubbing noted   L midline catheter + c/d/i  PSYCH: AAOx3        LABS:                        8.7    7.30  )-----------( 97       ( 19 Feb 2020 06:52 )             26.5     02-19    136  |  98  |  21  ----------------------------<  156<H>  3.7   |  27  |  1.20    Ca    9.4      19 Feb 2020 06:52    TPro  6.3  /  Alb  2.7<L>  /  TBili  6.4<H>  /  DBili  x   /  AST  27  /  ALT  12  /  AlkPhos  145<H>  02-19    PT/INR - ( 19 Feb 2020 08:41 )   PT: 22.7 sec;   INR: 1.93 ratio         PTT - ( 19 Feb 2020 08:41 )  PTT:43.3 sec            RADIOLOGY & ADDITIONAL TESTS:  Results Reviewed:   Imaging Personally Reviewed:  Electrocardiogram Personally Reviewed:    COORDINATION OF CARE:  Care Discussed with Consultants/Other Providers [Y/N]:  Prior or Outpatient Records Reviewed [Y/N]:  [place]

## 2020-02-19 NOTE — PROGRESS NOTE ADULT - PROBLEM SELECTOR PLAN 3
MELD=29  -2/2 to JEAN cirrhosis   -continue with lasix 40mg daily /spironolactone 25mg daily for ascites  - small ascites on US not sufficient for safe paracentesis  -encephalopathy: mental status at baseline, patient is on lactulose and rifaximin at home.   -varices: last EGD showed small varices and GAVE on 12/30  -ascites: trace on CT/exam. History of SBP per chart but not found in [place]. On lasix/aldactone 40/100 at home. No Abx PPX  -HCC not seen on most recent CT.

## 2020-02-19 NOTE — PROGRESS NOTE ADULT - ATTENDING COMMENTS
64/M with PMH IDDM, dyslipidemia, obesity, GERD, HFpEF with mild LV diastolic dysfunction, decompensated JEAN cirrhosis c/b ascites, h/o SBP, h/o HE, recent admission for ESBL E. 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 Patient Presented with Septic Shock    CT A/P with Emphysematous cystitis and U/A with pyuria  Blood Cultures with E coli (susceptibility pending)  Urine Culture with ESBL E coli  Favor Urinary Source    Overall, Septic Shock, E coli bacteremia, Emphysematous Cystitis, Fever, Leukocytosis, Pre-Liver Transplant Evaluation, Encounter to Vaccinate Patient    Continue Ertapenem 1g IV Q24H. Would treat 10 days from negative BCx. Stop Date of 2/21/20  No absolute ID contraindication for OLT  s/p Pneumovax this admission  Patient requires HBV (I will readdress as outpatient - we do not have heplisav as inpatient)    I will sign off at this time. Please feel free to contact me with any further questions or concerns.    Eusebio Pérez M.D.  [facility] Division of Infectious Disease  8AM-5PM: Pager Number [phone]  After Hours (or if no response): Please contact the Infectious Diseases Office at [phone]     The above assessment and plan were discussed with medicine NP

## 2020-02-19 NOTE — PROGRESS NOTE ADULT - ASSESSMENT
ASSESSMENT:  64/M with PMH IDDM, dyslipidemia, obesity, GERD, HFpEF with mild LV diastolic dysfunction, decompensated JEAN cirrhosis c/b ascites, h/o SBP, h/o HE, recent admission for ESBL E. 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  P/w confusion, weakness, and fall at home.  Febrile in ED (Tmax 103.3), tachycardic (110). Labs notable for leucocytosis 22,590, ammonia 67, lactate 5.8, BUN 19, Cr 1.24. CT imaging concerning for emphysematous cystitis  Started on Levophed, txf to MICU. ID consulted for abx recs  ==========  Septic shock likely 2/2 urinary source  - Blood cx and urine cx showing ESBL E. coli. UA positive. Imaging revealed emphysematous cystitis. CXR clear, RVP negative.  - past cx have grown ESBL E. coli, Klebsiella pneumoniae and Strep agalactiae  - pt asked about antibiotic prophylaxis for recurrent UTIs. On review of outpatient notes, it seems he was not on Bactrim.    Pre-LVAD and Pre-transplant screening  HIV 4th Generation screen: Negative  Syphilis screen: Negative  EBV Serology: Old infection  Toxoplasma IgG: Negative  Measles IgG: Reactive  Mumps IgG: Reactive  Rubella IgG: Reactive  Varicella IgG: Reactive  HSV 1/2 IgG: HSV-1 IgG reactive; HSV-2 IgG non-reactive  CMV IgG: Non-reactive  MRSA screen: Pending  Hepatitis A IgG: Reactive  Hepatitis B sAg: non-reactive  Hepatitis B sAb: non-reactive  Hepatitis B c IgM Ab: non-reactive  Hepatitic C Ab: non-reactive  Quantiferon: negative    Vaccinations  - would require Hepatitis B  - would require Pneumovax    RECOMMENDATIONS:  1. Septic shock likely 2/2 urinary source  - continue Ertapenem 1g/d IV  - anticipate 10 days of antibiotic therapy from first negative blood cx (stop date 2/21 anticipated)  ----------------  2. Pre-transplant  - would recommend Hepatitis B and Pneumovax vaccine, after resolution of acute issues  - no absolute contra-indications from ID standpoint for OLT    CHERI Bruno MD  Fellow, Infectious Diseases  Pager: [phone]  After 5pm and on Weekends: Call [phone] ASSESSMENT:  64/M with PMH IDDM, dyslipidemia, obesity, GERD, HFpEF with mild LV diastolic dysfunction, decompensated JEAN cirrhosis c/b ascites, h/o SBP, h/o HE, recent admission for ESBL E. 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  P/w confusion, weakness, and fall at home.  Febrile in ED (Tmax 103.3), tachycardic (110). Labs notable for leucocytosis 22,590, ammonia 67, lactate 5.8, BUN 19, Cr 1.24. CT imaging concerning for emphysematous cystitis  Started on Levophed, txf to MICU. ID consulted for abx recs  ==========  Septic shock likely 2/2 urinary source  - Blood cx and urine cx showing ESBL E. coli. UA positive. Imaging revealed emphysematous cystitis. CXR clear, RVP negative.  - past cx have grown ESBL E. coli, Klebsiella pneumoniae and Strep agalactiae  - pt asked about antibiotic prophylaxis for recurrent UTIs. On review of outpatient notes, it seems he was not on Bactrim.    Pre-LVAD and Pre-transplant screening  HIV 4th Generation screen: Negative  Syphilis screen: Negative  EBV Serology: Old infection  Toxoplasma IgG: Negative  Measles IgG: Reactive  Mumps IgG: Reactive  Rubella IgG: Reactive  Varicella IgG: Reactive  HSV 1/2 IgG: HSV-1 IgG reactive; HSV-2 IgG non-reactive  CMV IgG: Non-reactive  MRSA screen: Pending  Hepatitis A IgG: Reactive  Hepatitis B sAg: non-reactive  Hepatitis B sAb: non-reactive  Hepatitis B c IgM Ab: non-reactive  Hepatitic C Ab: non-reactive  Quantiferon: negative    Vaccinations  - would require Hepatitis B (will readdress as outpatient)  - s/p Pneumovax this admission    RECOMMENDATIONS:  1. Septic shock likely 2/2 urinary source  - continue Ertapenem 1g/d IV  - 10 days of antibiotic therapy from first negative blood cx (stop date 2/21)  ----------------  2. Pre-transplant  - anticipate vaccinating with HBV vaccine series as outpatient (we do not have heplisav as inpatient and do not have engerix as outpatient). Heplisav vaccination series is 2 shots spaced 1 month apart.   - no absolute contra-indications from ID standpoint for CHERI Kinney MD  Fellow, Infectious Diseases  Pager: [phone]  After 5pm and on Weekends: Call [phone]

## 2020-02-19 NOTE — PROGRESS NOTE ADULT - ASSESSMENT
Impression:     # Septic shock 2/2 Ecoli bacteremia, from urinary source. Urine culture now growing Ecoli ESBL, repeat blood cultures negative.   # Decompensated JEAN Cirrhosis, UNOS listed. Current MELD Na 25 02/18/2020 (same baseline of 25).   -encephalopathy: mental status at baseline, patient is on lactulose and rifaximin at home.   -varices: last EGD showed small varices and GAVE on 12/30  -ascites: trace on CT/exam. History of SBP per chart but not found in [place]. On lasix/aldactone 40/100 at home. No Abx PPX  -HCC not seen on most recent CT.     # DM    Recommendation:  - continue with ertapenem as per ID ending 2/21  - continue with lasix 40mg daily /spironolactone 25mg daily for ascites  - continue with midodrine 20mg q8hr  - continue with lactulose 20mg tid 3-4 bowel movements and rifaximin.   - DVT ppx with lovenox  - continue fluid restriction (1.5L)  - DC planning Impression:     # Septic shock 2/2 Ecoli bacteremia, from urinary source. Urine culture now growing Ecoli ESBL, repeat blood cultures negative.   # Decompensated JEAN Cirrhosis, UNOS listed. Current MELD Na 24 02/19/2020 (same baseline of 25).   -encephalopathy: mental status at baseline, patient is on lactulose and rifaximin at home.   -varices: last EGD showed small varices and GAVE on 12/30  -ascites: trace on CT/exam. History of SBP per chart but not found in [place]. On lasix/aldactone 40/100 at home. No Abx PPX  -HCC not seen on most recent CT.     # DM    Recommendation:  - continue with ertapenem as per ID ending 2/21  - continue with lasix 40mg daily /spironolactone 25mg daily for ascites  - continue with midodrine 20mg q8hr  - continue with lactulose 20mg tid 3-4 bowel movements and rifaximin.   - DVT ppx with lovenox  - continue fluid restriction (1.5L)  - DC planning

## 2020-02-19 NOTE — PROGRESS NOTE ADULT - SUBJECTIVE AND OBJECTIVE BOX
Follow Up:  ESBL E. coli UTI, sepsis, pre-liver transplant    Interval History/ROS:Patient is a 64y old  Male who presents with a chief complaint of Septic shock secondary to UTI (16 Feb 2020 11:56)  - no acute events reported overnight  - patient assessed at bedside. Denied any acute complaints. Reports marked improvement in dysuria    Allergies    codeine (Anaphylaxis)    Intolerances        ANTIMICROBIALS:  ertapenem  IVPB 1000 every 24 hours  rifAXIMin 550 two times a day      OTHER MEDS:  dextrose 40% Gel 15 Gram(s) Oral once PRN  dextrose 5%. 1000 milliLiter(s) IV Continuous <Continuous>  dextrose 50% Injectable 12.5 Gram(s) IV Push once  dextrose 50% Injectable 25 Gram(s) IV Push once  dextrose 50% Injectable 25 Gram(s) IV Push once  enoxaparin Injectable 40 milliGRAM(s) SubCutaneous daily  folic acid 1 milliGRAM(s) Oral daily  furosemide    Tablet 20 milliGRAM(s) Oral daily  glucagon  Injectable 1 milliGRAM(s) IntraMuscular once PRN  insulin glargine Injectable (LANTUS) 12 Unit(s) SubCutaneous at bedtime  insulin lispro (HumaLOG) corrective regimen sliding scale   SubCutaneous three times a day before meals  insulin lispro (HumaLOG) corrective regimen sliding scale   SubCutaneous at bedtime  insulin lispro Injectable (HumaLOG) 6 Unit(s) SubCutaneous three times a day with meals  lactulose Syrup 20 Gram(s) Oral three times a day  midodrine 20 milliGRAM(s) Oral every 8 hours  multivitamin 1 Tablet(s) Oral daily  pantoprazole    Tablet 40 milliGRAM(s) Oral two times a day  spironolactone 50 milliGRAM(s) Oral daily      Vital Signs Last 24 Hrs  T(C): 36.7 (19 Feb 2020 13:00), Max: 36.7 (18 Feb 2020 14:12)  T(F): 98.1 (19 Feb 2020 13:00), Max: 98.1 (19 Feb 2020 13:00)  HR: 75 (19 Feb 2020 13:00) (75 - 83)  BP: 111/64 (19 Feb 2020 13:00) (108/63 - 111/64)  BP(mean): --  RR: 18 (19 Feb 2020 13:00) (18 - 18)  SpO2: 100% (19 Feb 2020 13:00) (98% - 100%)    EXAM:  Constitutional: Not in acute distress  Eyes: No icterus. Pupils b/l reactive to light. Scleral and generalized icterus.  Oral cavity: Clear, no lesions  Neck: No neck vein distension noted  RS: Chest clear to auscultation bilaterally. No wheeze/rhonchi/crepitations.  CVS: S1, S2 heard. Regular rate and rhythm. No murmurs/rubs/gallops.  Abdomen: Soft. No guarding/rigidity/tenderness.  : No acute abnormalities  Extremities: Warm. No pedal edema  Skin: No lesions noted  Vascular: No evidence of phlebitis  Neuro: Alert, oriented to time/place/person. No asterixis                        8.7    7.30  )-----------( 97       ( 19 Feb 2020 06:52 )             26.5       02-19    136  |  98  |  21  ----------------------------<  156<H>  3.7   |  27  |  1.20    Ca    9.4      19 Feb 2020 06:52    TPro  6.3  /  Alb  2.7<L>  /  TBili  6.4<H>  /  DBili  x   /  AST  27  /  ALT  12  /  AlkPhos  145<H>  02-19          MICROBIOLOGY:Culture Results:   No growth at 5 days. (02-13 @ 09:44)  Culture Results:   No growth at 5 days. (02-13 @ 09:44)  Culture Results:   No growth at 5 days. (02-12 @ 22:11)  Culture Results:   No growth at 5 days. (02-12 @ 22:11)      RADIOLOGY:  < from: CT Abdomen and Pelvis w/ IV Cont (02.11.20 @ 14:30) >  IMPRESSION:   Emphysematous cystitis.   Cirrhosis.  Nonobstructing left renal calculus.  < end of copied text > Follow Up:  ESBL E. coli UTI, sepsis, pre-liver transplant    Interval History/ROS:Patient is a 64y old  Male who presents with a chief complaint of Septic shock secondary to UTI (16 Feb 2020 11:56)  - no acute events reported overnight  - patient assessed at bedside. Denied any acute complaints. Reports marked improvement in dysuria    Allergies    codeine (Anaphylaxis)    Intolerances        ANTIMICROBIALS:  ertapenem  IVPB 1000 every 24 hours  rifAXIMin 550 two times a day      OTHER MEDS:  dextrose 40% Gel 15 Gram(s) Oral once PRN  dextrose 5%. 1000 milliLiter(s) IV Continuous <Continuous>  dextrose 50% Injectable 12.5 Gram(s) IV Push once  dextrose 50% Injectable 25 Gram(s) IV Push once  dextrose 50% Injectable 25 Gram(s) IV Push once  enoxaparin Injectable 40 milliGRAM(s) SubCutaneous daily  folic acid 1 milliGRAM(s) Oral daily  furosemide    Tablet 20 milliGRAM(s) Oral daily  glucagon  Injectable 1 milliGRAM(s) IntraMuscular once PRN  insulin glargine Injectable (LANTUS) 12 Unit(s) SubCutaneous at bedtime  insulin lispro (HumaLOG) corrective regimen sliding scale   SubCutaneous three times a day before meals  insulin lispro (HumaLOG) corrective regimen sliding scale   SubCutaneous at bedtime  insulin lispro Injectable (HumaLOG) 6 Unit(s) SubCutaneous three times a day with meals  lactulose Syrup 20 Gram(s) Oral three times a day  midodrine 20 milliGRAM(s) Oral every 8 hours  multivitamin 1 Tablet(s) Oral daily  pantoprazole    Tablet 40 milliGRAM(s) Oral two times a day  spironolactone 50 milliGRAM(s) Oral daily      Vital Signs Last 24 Hrs  T(C): 36.7 (19 Feb 2020 13:00), Max: 36.7 (18 Feb 2020 14:12)  T(F): 98.1 (19 Feb 2020 13:00), Max: 98.1 (19 Feb 2020 13:00)  HR: 75 (19 Feb 2020 13:00) (75 - 83)  BP: 111/64 (19 Feb 2020 13:00) (108/63 - 111/64)  BP(mean): --  RR: 18 (19 Feb 2020 13:00) (18 - 18)  SpO2: 100% (19 Feb 2020 13:00) (98% - 100%)    EXAM:  Constitutional: Not in acute distress  Eyes: No icterus. Pupils b/l reactive to light. Scleral and generalized icterus.  Oral cavity: Clear, no lesions  Neck: No neck vein distension noted  RS: Chest clear to auscultation bilaterally. No wheeze/rhonchi/crepitations.  CVS: S1, S2 heard. Regular rate and rhythm. No murmurs/rubs/gallops.  Abdomen: Soft. No guarding/rigidity/tenderness.  : No acute abnormalities  Extremities: Warm. No pedal edema  Skin: No lesions noted  Vascular: LUE PICC line site appears clear  Neuro: Alert, oriented to time/place/person. No asterixis                        8.7    7.30  )-----------( 97       ( 19 Feb 2020 06:52 )             26.5       02-19    136  |  98  |  21  ----------------------------<  156<H>  3.7   |  27  |  1.20    Ca    9.4      19 Feb 2020 06:52    TPro  6.3  /  Alb  2.7<L>  /  TBili  6.4<H>  /  DBili  x   /  AST  27  /  ALT  12  /  AlkPhos  145<H>  02-19          MICROBIOLOGY:Culture Results:   No growth at 5 days. (02-13 @ 09:44)  Culture Results:   No growth at 5 days. (02-13 @ 09:44)  Culture Results:   No growth at 5 days. (02-12 @ 22:11)  Culture Results:   No growth at 5 days. (02-12 @ 22:11)      RADIOLOGY:  < from: CT Abdomen and Pelvis w/ IV Cont (02.11.20 @ 14:30) >  IMPRESSION:   Emphysematous cystitis.   Cirrhosis.  Nonobstructing left renal calculus.  < end of copied text >

## 2020-02-19 NOTE — PROGRESS NOTE ADULT - ASSESSMENT
Pt is a 64M with IDDM, dyslipidemia, obesity, GERD, HFpEF with mild LV diastolic dysfunction, and decompensated JEAN cirrhosis complicated by ascites, history of SBP, hepatic encephalopathy, and chronic anemia with a history of duodenal ulcer as well as GAVE and duodenal AVM s/p APC (last on 10/11/19), who is UNOS listed for liver transplantation at [facility] who presents with confusion, weakness, and fall at home found to be in septic shock 2/2 e. coli bacteremia

## 2020-02-19 NOTE — PROGRESS NOTE ADULT - SUBJECTIVE AND OBJECTIVE BOX
Chief Complaint:  Patient is a 64y old  Male who presents with a chief complaint of Septic shock secondary to UTI (16 Feb 2020 11:56)      Interval Events: Awaiting discharge. DC delayed as there is lapse in insurance.   ROS: All 12 point system except listed above were otherwise negative.    Allergies:  codeine (Anaphylaxis)        Hospital Medications:  dextrose 40% Gel 15 Gram(s) Oral once PRN  dextrose 5%. 1000 milliLiter(s) IV Continuous <Continuous>  dextrose 50% Injectable 12.5 Gram(s) IV Push once  dextrose 50% Injectable 25 Gram(s) IV Push once  dextrose 50% Injectable 25 Gram(s) IV Push once  enoxaparin Injectable 40 milliGRAM(s) SubCutaneous daily  ertapenem  IVPB 1000 milliGRAM(s) IV Intermittent every 24 hours  folic acid 1 milliGRAM(s) Oral daily  furosemide    Tablet 20 milliGRAM(s) Oral daily  glucagon  Injectable 1 milliGRAM(s) IntraMuscular once PRN  insulin glargine Injectable (LANTUS) 12 Unit(s) SubCutaneous at bedtime  insulin lispro (HumaLOG) corrective regimen sliding scale   SubCutaneous three times a day before meals  insulin lispro (HumaLOG) corrective regimen sliding scale   SubCutaneous at bedtime  insulin lispro Injectable (HumaLOG) 6 Unit(s) SubCutaneous three times a day with meals  lactulose Syrup 20 Gram(s) Oral three times a day  midodrine 20 milliGRAM(s) Oral every 8 hours  multivitamin 1 Tablet(s) Oral daily  pantoprazole    Tablet 40 milliGRAM(s) Oral two times a day  rifAXIMin 550 milliGRAM(s) Oral two times a day  spironolactone 50 milliGRAM(s) Oral daily      PMHX/PSHX:  GIB (gastrointestinal bleeding)  GERD with esophagitis  Hepatic encephalopathy  Obesity  Fatty liver disease, nonalcoholic  Renal stones  Hypertension  Neuropathy  Hypercholesteremia  Diabetes  S/P cholecystectomy  No significant past surgical history      Family history:  Family history of type 2 diabetes mellitus  Family history of hypertension  Family history of stomach cancer  No pertinent family history in first degree relatives    There is no family history of peptic ulcer disease, gastric cancer, colon polyps, colon cancer, celiac disease, biliary, hepatic, or pancreatic disease.  None of the female relatives have breast, uterine, or ovarian cancer.     ROS:   General:  No fevers, chills or night sweats  ENT:  No sore throat or dysphagia  CV:  No pain or palpitations  Resp:  No dyspnea, cough or  wheezing  GI:  as above  Skin:  No rash or edema  Neuro: no weakness   Hematologic: no bleeding  Musculoskeletal: no muscle pain or join pain  Psych: no agitation          PHYSICAL EXAM:   Vital Signs:  Vital Signs Last 24 Hrs  T(C): 36.6 (19 Feb 2020 05:11), Max: 36.7 (18 Feb 2020 14:12)  T(F): 97.9 (19 Feb 2020 05:11), Max: 98 (18 Feb 2020 14:12)  HR: 76 (19 Feb 2020 05:11) (76 - 83)  BP: 108/63 (19 Feb 2020 05:11) (108/63 - 110/62)  BP(mean): --  RR: 18 (19 Feb 2020 05:11) (18 - 18)  SpO2: 98% (19 Feb 2020 05:11) (98% - 100%)  Daily     Daily     GENERAL: NAD, well-developed  EYES: EOMI, PERRL, icteric sclera   NECK: Supple, No JVD  CHEST/LUNG: Clear to auscultation bilaterally; No wheezes rales or rhonchi  HEART: s1 s2 Regular rate and rhythm; No murmurs, rubs  ABDOMEN: Soft, Nontender, Nondistended; Bowel sounds present. No fluid wave appreciated.   EXTREMITIES:  trace edema b/l. Chronic venous changes. No clubbing noted   L midline catheter + c/d/i  PSYCH: AAOx3    LABS:                        8.7    7.30  )-----------( 97       ( 19 Feb 2020 06:52 )             26.5     Mean Cell Volume: 106.9 fl (02-19-20 @ 06:52)    02-19    136  |  98  |  21  ----------------------------<  156<H>  3.7   |  27  |  1.20    Ca    9.4      19 Feb 2020 06:52    TPro  6.3  /  Alb  2.7<L>  /  TBili  6.4<H>  /  DBili  x   /  AST  27  /  ALT  12  /  AlkPhos  145<H>  02-19    LIVER FUNCTIONS - ( 19 Feb 2020 06:52 )  Alb: 2.7 g/dL / Pro: 6.3 g/dL / ALK PHOS: 145 U/L / ALT: 12 U/L / AST: 27 U/L / GGT: x           PT/INR - ( 18 Feb 2020 07:49 )   PT: 22.6 sec;   INR: 1.96 ratio         PTT - ( 18 Feb 2020 07:49 )  PTT:41.4 sec                            8.7    7.30  )-----------( 97       ( 19 Feb 2020 06:52 )             26.5                         9.2    9.67  )-----------( 108      ( 18 Feb 2020 06:52 )             27.3                         8.7    9.63  )-----------( 87       ( 17 Feb 2020 05:54 )             25.8     Imaging:

## 2020-02-20 LAB
ALBUMIN SERPL ELPH-MCNC: 2.7 G/DL — LOW (ref 3.3–5)
ALP SERPL-CCNC: 149 U/L — HIGH (ref 40–120)
ALT FLD-CCNC: 15 U/L — SIGNIFICANT CHANGE UP (ref 10–45)
ANION GAP SERPL CALC-SCNC: 10 MMOL/L — SIGNIFICANT CHANGE UP (ref 5–17)
APTT BLD: 42.5 SEC — HIGH (ref 27.5–36.3)
AST SERPL-CCNC: 30 U/L — SIGNIFICANT CHANGE UP (ref 10–40)
BILIRUB SERPL-MCNC: 6.7 MG/DL — HIGH (ref 0.2–1.2)
BUN SERPL-MCNC: 23 MG/DL — SIGNIFICANT CHANGE UP (ref 7–23)
CALCIUM SERPL-MCNC: 9.6 MG/DL — SIGNIFICANT CHANGE UP (ref 8.4–10.5)
CHLORIDE SERPL-SCNC: 98 MMOL/L — SIGNIFICANT CHANGE UP (ref 96–108)
CO2 SERPL-SCNC: 27 MMOL/L — SIGNIFICANT CHANGE UP (ref 22–31)
CREAT SERPL-MCNC: 1.13 MG/DL — SIGNIFICANT CHANGE UP (ref 0.5–1.3)
GLUCOSE BLDC GLUCOMTR-MCNC: 112 MG/DL — HIGH (ref 70–99)
GLUCOSE BLDC GLUCOMTR-MCNC: 194 MG/DL — HIGH (ref 70–99)
GLUCOSE BLDC GLUCOMTR-MCNC: 251 MG/DL — HIGH (ref 70–99)
GLUCOSE BLDC GLUCOMTR-MCNC: 253 MG/DL — HIGH (ref 70–99)
GLUCOSE SERPL-MCNC: 116 MG/DL — HIGH (ref 70–99)
HCT VFR BLD CALC: 26.2 % — LOW (ref 39–50)
HGB BLD-MCNC: 9 G/DL — LOW (ref 13–17)
INR BLD: 1.93 RATIO — HIGH (ref 0.88–1.16)
MCHC RBC-ENTMCNC: 34.4 GM/DL — SIGNIFICANT CHANGE UP (ref 32–36)
MCHC RBC-ENTMCNC: 36 PG — HIGH (ref 27–34)
MCV RBC AUTO: 104.8 FL — HIGH (ref 80–100)
NRBC # BLD: 0 /100 WBCS — SIGNIFICANT CHANGE UP (ref 0–0)
PLATELET # BLD AUTO: 93 K/UL — LOW (ref 150–400)
POTASSIUM SERPL-MCNC: 3.6 MMOL/L — SIGNIFICANT CHANGE UP (ref 3.5–5.3)
POTASSIUM SERPL-SCNC: 3.6 MMOL/L — SIGNIFICANT CHANGE UP (ref 3.5–5.3)
PROT SERPL-MCNC: 6.3 G/DL — SIGNIFICANT CHANGE UP (ref 6–8.3)
PROTHROM AB SERPL-ACNC: 22.4 SEC — HIGH (ref 10–13.1)
RBC # BLD: 2.5 M/UL — LOW (ref 4.2–5.8)
RBC # FLD: 15 % — HIGH (ref 10.3–14.5)
SODIUM SERPL-SCNC: 135 MMOL/L — SIGNIFICANT CHANGE UP (ref 135–145)
WBC # BLD: 6.8 K/UL — SIGNIFICANT CHANGE UP (ref 3.8–10.5)
WBC # FLD AUTO: 6.8 K/UL — SIGNIFICANT CHANGE UP (ref 3.8–10.5)

## 2020-02-20 PROCEDURE — 99232 SBSQ HOSP IP/OBS MODERATE 35: CPT

## 2020-02-20 PROCEDURE — 99233 SBSQ HOSP IP/OBS HIGH 50: CPT

## 2020-02-20 RX ADMIN — Medication 6 UNIT(S): at 17:45

## 2020-02-20 RX ADMIN — MIDODRINE HYDROCHLORIDE 20 MILLIGRAM(S): 2.5 TABLET ORAL at 22:21

## 2020-02-20 RX ADMIN — SPIRONOLACTONE 50 MILLIGRAM(S): 25 TABLET, FILM COATED ORAL at 05:21

## 2020-02-20 RX ADMIN — ERTAPENEM SODIUM 120 MILLIGRAM(S): 1 INJECTION, POWDER, LYOPHILIZED, FOR SOLUTION INTRAMUSCULAR; INTRAVENOUS at 13:06

## 2020-02-20 RX ADMIN — Medication 20 MILLIGRAM(S): at 05:20

## 2020-02-20 RX ADMIN — Medication 1 TABLET(S): at 13:06

## 2020-02-20 RX ADMIN — LACTULOSE 20 GRAM(S): 10 SOLUTION ORAL at 13:06

## 2020-02-20 RX ADMIN — PANTOPRAZOLE SODIUM 40 MILLIGRAM(S): 20 TABLET, DELAYED RELEASE ORAL at 17:44

## 2020-02-20 RX ADMIN — INSULIN GLARGINE 12 UNIT(S): 100 INJECTION, SOLUTION SUBCUTANEOUS at 22:24

## 2020-02-20 RX ADMIN — Medication 1 MILLIGRAM(S): at 13:06

## 2020-02-20 RX ADMIN — PANTOPRAZOLE SODIUM 40 MILLIGRAM(S): 20 TABLET, DELAYED RELEASE ORAL at 05:20

## 2020-02-20 RX ADMIN — Medication 6 UNIT(S): at 09:24

## 2020-02-20 RX ADMIN — MIDODRINE HYDROCHLORIDE 20 MILLIGRAM(S): 2.5 TABLET ORAL at 13:06

## 2020-02-20 RX ADMIN — Medication 3: at 13:06

## 2020-02-20 RX ADMIN — Medication 6 UNIT(S): at 13:06

## 2020-02-20 RX ADMIN — Medication 3: at 17:44

## 2020-02-20 RX ADMIN — MIDODRINE HYDROCHLORIDE 20 MILLIGRAM(S): 2.5 TABLET ORAL at 05:20

## 2020-02-20 RX ADMIN — LACTULOSE 20 GRAM(S): 10 SOLUTION ORAL at 22:21

## 2020-02-20 RX ADMIN — LACTULOSE 20 GRAM(S): 10 SOLUTION ORAL at 05:20

## 2020-02-20 RX ADMIN — ENOXAPARIN SODIUM 40 MILLIGRAM(S): 100 INJECTION SUBCUTANEOUS at 13:07

## 2020-02-20 NOTE — PROGRESS NOTE ADULT - ASSESSMENT
Pt is a 64M with IDDM, dyslipidemia, obesity, GERD, HFpEF with mild LV diastolic dysfunction, and decompensated JEAN cirrhosis complicated by ascites, history of SBP, hepatic encephalopathy, and chronic anemia with a history of duodenal ulcer as well as GAVE and duodenal AVM s/p APC (last on 10/11/19), who is UNOS listed for liver transplantation at [facility] who presents with confusion, weakness, and fall at home found to be in septic shock 2/2 e. coli bacteremia

## 2020-02-20 NOTE — PROGRESS NOTE ADULT - PROBLEM SELECTOR PLAN 3
MELD=29  -2/2 to JEAN cirrhosis   -continue with lasix 20mg daily /spironolactone 50mg daily for ascites  - small ascites on US not sufficient for safe paracentesis  -encephalopathy: mental status at baseline, patient is on lactulose and rifaximin at home.   -varices: last EGD showed small varices and GAVE on 12/30  -ascites: trace on CT/exam. History of SBP per chart but not found in [place]. No Abx PPX  -HCC not seen on most recent CT.

## 2020-02-20 NOTE — PROGRESS NOTE ADULT - SUBJECTIVE AND OBJECTIVE BOX
Chief Complaint:  Patient is a 64y old  Male who presents with a chief complaint of Septic shock secondary to UTI (2020 11:56)      Interval Events: Pt denies nausea, vomiting, abdominal pain.   ROS: All 12 point system except listed above were otherwise negative.    Allergies:  codeine (Anaphylaxis)        Hospital Medications:  dextrose 40% Gel 15 Gram(s) Oral once PRN  dextrose 5%. 1000 milliLiter(s) IV Continuous <Continuous>  dextrose 50% Injectable 12.5 Gram(s) IV Push once  dextrose 50% Injectable 25 Gram(s) IV Push once  dextrose 50% Injectable 25 Gram(s) IV Push once  enoxaparin Injectable 40 milliGRAM(s) SubCutaneous daily  ertapenem  IVPB 1000 milliGRAM(s) IV Intermittent every 24 hours  folic acid 1 milliGRAM(s) Oral daily  furosemide    Tablet 20 milliGRAM(s) Oral daily  glucagon  Injectable 1 milliGRAM(s) IntraMuscular once PRN  insulin glargine Injectable (LANTUS) 12 Unit(s) SubCutaneous at bedtime  insulin lispro (HumaLOG) corrective regimen sliding scale   SubCutaneous three times a day before meals  insulin lispro (HumaLOG) corrective regimen sliding scale   SubCutaneous at bedtime  insulin lispro Injectable (HumaLOG) 6 Unit(s) SubCutaneous three times a day with meals  lactulose Syrup 20 Gram(s) Oral three times a day  midodrine 20 milliGRAM(s) Oral every 8 hours  multivitamin 1 Tablet(s) Oral daily  pantoprazole    Tablet 40 milliGRAM(s) Oral two times a day  rifAXIMin 550 milliGRAM(s) Oral two times a day  spironolactone 50 milliGRAM(s) Oral daily      PMHX/PSHX:  GIB (gastrointestinal bleeding)  GERD with esophagitis  Hepatic encephalopathy  Obesity  Fatty liver disease, nonalcoholic  Renal stones  Hypertension  Neuropathy  Hypercholesteremia  Diabetes  S/P cholecystectomy  No significant past surgical history      Family history:  Family history of type 2 diabetes mellitus  Family history of hypertension  Family history of stomach cancer  No pertinent family history in first degree relatives    There is no family history of peptic ulcer disease, gastric cancer, colon polyps, colon cancer, celiac disease, biliary, hepatic, or pancreatic disease.  None of the female relatives have breast, uterine, or ovarian cancer.     ROS:   General:  No fevers, chills or night sweats  ENT:  No sore throat or dysphagia  CV:  No pain or palpitations  Resp:  No dyspnea, cough or  wheezing  GI:  as above  Skin:  No rash or edema  Neuro: no weakness   Hematologic: no bleeding  Musculoskeletal: no muscle pain or join pain  Psych: no agitation        PHYSICAL EXAM:   Vital Signs:  Vital Signs Last 24 Hrs  T(C): 36.8 (2020 04:12), Max: 36.8 (2020 04:12)  T(F): 98.2 (2020 04:12), Max: 98.2 (2020 04:12)  HR: 78 (2020 04:12) (75 - 87)  BP: 116/64 (2020 04:12) (111/63 - 116/64)  BP(mean): --  RR: 18 (2020 04:12) (18 - 18)  SpO2: 100% (2020 04:12) (98% - 100%)  Daily     Daily Weight in k (2020 05:29)    GENERAL: NAD, well-developed  EYES: EOMI, PERRL, icteric sclera   NECK: Supple, No JVD  CHEST/LUNG: Clear to auscultation bilaterally; No wheezes rales or rhonchi  HEART: s1 s2 Regular rate and rhythm; No murmurs, rubs  ABDOMEN: Soft, Nontender, Nondistended; Bowel sounds present. No fluid wave appreciated.   EXTREMITIES:  trace edema b/l. Chronic venous changes. No clubbing noted   L midline catheter + c/d/i  PSYCH: AAOx3    LABS:                        9.0    6.80  )-----------( 93       ( 2020 07:07 )             26.2     Mean Cell Volume: 104.8 fl (20 @ 07:07)        135  |  98  |  23  ----------------------------<  116<H>  3.6   |  27  |  1.13    Ca    9.6      2020 06:57    TPro  6.3  /  Alb  2.7<L>  /  TBili  6.7<H>  /  DBili  x   /  AST  30  /  ALT  15  /  AlkPhos  149<H>      LIVER FUNCTIONS - ( 2020 06:57 )  Alb: 2.7 g/dL / Pro: 6.3 g/dL / ALK PHOS: 149 U/L / ALT: 15 U/L / AST: 30 U/L / GGT: x           PT/INR - ( 2020 08:41 )   PT: 22.7 sec;   INR: 1.93 ratio         PTT - ( 2020 08:41 )  PTT:43.3 sec                            9.0    6.80  )-----------( 93       ( 2020 07:07 )             26.2                         8.7    7.30  )-----------( 97       ( 2020 06:52 )             26.5                         9.2    9.67  )-----------( 108      ( 2020 06:52 )             27.3     Imaging:

## 2020-02-20 NOTE — PROGRESS NOTE ADULT - ATTENDING COMMENTS
Hepatology Staff: Yomi Medina MD    I saw and examined the patient along with   02-20-20.    Vitals: Vital Signs Last 24 Hrs  T(C): 36.8 (20 Feb 2020 04:12), Max: 36.8 (20 Feb 2020 04:12)  T(F): 98.2 (20 Feb 2020 04:12), Max: 98.2 (20 Feb 2020 04:12)  HR: 78 (20 Feb 2020 04:12) (75 - 87)  BP: 116/64 (20 Feb 2020 04:12) (111/63 - 116/64)    RR: 18 (20 Feb 2020 04:12) (18 - 18)  SpO2: 100% (20 Feb 2020 04:12) (98% - 100%)    Labs:INR: 1.93 ratio (02-20-20 @ 08:31)  Creatinine, Serum: 1.13 mg/dL (02-20-20 @ 06:57)  Bilirubin Total, Serum: 6.7 mg/dL (02-20-20 @ 06:57)    I/O: I&O's Summary    19 Feb 2020 07:01  -  20 Feb 2020 07:00  --------------------------------------------------------  IN: 520 mL / OUT: 2600 mL / NET: -2080 mL      Nutritional Status:   Albumin, Serum: 2.7 g/dL (02-20-20 @ 06:57)    Last 24 hour events: Stable clinically.    Recommendations: Continue with current antibiotics (will complete Rx on 2/21). Possible disposition tomorrow with plan for outpatient follow up. OT/PT to continue while in hospital.    Plan discussed with Primary team.

## 2020-02-20 NOTE — PROGRESS NOTE ADULT - PROBLEM SELECTOR PLAN 1
septic shock 2/2 to emphysematous cystitis on CT with nonobstructive L renal calculus 8mm UCX 2/11 with  E coli  and ESBL E.coli bacteremia  -switched to Invanz 2/13 --> BCX 2/12 and 2/13 NGTD  - will complete course on 2/21  -continue with midodrine 20mg q8hr

## 2020-02-20 NOTE — PROGRESS NOTE ADULT - PROBLEM SELECTOR PLAN 5
heme onc following  follow up plts
conitnue with lasix/spironolactone

## 2020-02-20 NOTE — PROGRESS NOTE ADULT - ASSESSMENT
Chief Complaint:  Patient is a 64y old  Male who presents with a chief complaint of Septic shock secondary to UTI (2020 11:56)      Interval Events:   ROS: All 12 point system except listed above were otherwise negative.    Allergies:  codeine (Anaphylaxis)        Hospital Medications:  dextrose 40% Gel 15 Gram(s) Oral once PRN  dextrose 5%. 1000 milliLiter(s) IV Continuous <Continuous>  dextrose 50% Injectable 12.5 Gram(s) IV Push once  dextrose 50% Injectable 25 Gram(s) IV Push once  dextrose 50% Injectable 25 Gram(s) IV Push once  enoxaparin Injectable 40 milliGRAM(s) SubCutaneous daily  ertapenem  IVPB 1000 milliGRAM(s) IV Intermittent every 24 hours  folic acid 1 milliGRAM(s) Oral daily  furosemide    Tablet 20 milliGRAM(s) Oral daily  glucagon  Injectable 1 milliGRAM(s) IntraMuscular once PRN  insulin glargine Injectable (LANTUS) 12 Unit(s) SubCutaneous at bedtime  insulin lispro (HumaLOG) corrective regimen sliding scale   SubCutaneous three times a day before meals  insulin lispro (HumaLOG) corrective regimen sliding scale   SubCutaneous at bedtime  insulin lispro Injectable (HumaLOG) 6 Unit(s) SubCutaneous three times a day with meals  lactulose Syrup 20 Gram(s) Oral three times a day  midodrine 20 milliGRAM(s) Oral every 8 hours  multivitamin 1 Tablet(s) Oral daily  pantoprazole    Tablet 40 milliGRAM(s) Oral two times a day  rifAXIMin 550 milliGRAM(s) Oral two times a day  spironolactone 50 milliGRAM(s) Oral daily      PMHX/PSHX:  GIB (gastrointestinal bleeding)  GERD with esophagitis  Hepatic encephalopathy  Obesity  Fatty liver disease, nonalcoholic  Renal stones  Hypertension  Neuropathy  Hypercholesteremia  Diabetes  S/P cholecystectomy  No significant past surgical history      Family history:  Family history of type 2 diabetes mellitus  Family history of hypertension  Family history of stomach cancer  No pertinent family history in first degree relatives    There is no family history of peptic ulcer disease, gastric cancer, colon polyps, colon cancer, celiac disease, biliary, hepatic, or pancreatic disease.  None of the female relatives have breast, uterine, or ovarian cancer.     PHYSICAL EXAM:   Vital Signs:  Vital Signs Last 24 Hrs  T(C): 36.8 (2020 04:12), Max: 36.8 (2020 04:12)  T(F): 98.2 (2020 04:12), Max: 98.2 (2020 04:12)  HR: 78 (2020 04:12) (75 - 87)  BP: 116/64 (2020 04:12) (111/63 - 116/64)  BP(mean): --  RR: 18 (2020 04:12) (18 - 18)  SpO2: 100% (2020 04:12) (98% - 100%)  Daily     Daily Weight in k (2020 05:29)    GENERAL:  Appears stated age, well-groomed, well-nourished, no distress  HEENT:  NC/AT,  conjunctivae clear and pink, sclera -anicteric  CHEST:  Full & symmetric excursion, no increased effort, breath sounds clear  HEART:  Regular rhythm, S1, S2, no murmur/rub/S3/S4, no abdominal bruit, no edema  ABDOMEN:  Soft, non-tender, non-distended, normoactive bowel sounds,  no masses ,no hepato-splenomegaly, no signs of chronic liver disease  EXTREMITIES:  no cyanosis,clubbing or edema  SKIN:  No rash/erythema/ecchymoses/petechiae/wounds/abscess/warm/dry  NEURO:  Alert, oriented, no asterixis, no tremor, no encephalopathy  PSYCH: Alert and oriented    LABS:                        9.0    6.80  )-----------( 93       ( 2020 07:07 )             26.2     Mean Cell Volume: 104.8 fl (20 @ 07:07)        135  |  98  |  23  ----------------------------<  116<H>  3.6   |  27  |  1.13    Ca    9.6      2020 06:57    TPro  6.3  /  Alb  2.7<L>  /  TBili  6.7<H>  /  DBili  x   /  AST  30  /  ALT  15  /  AlkPhos  149<H>  0220    LIVER FUNCTIONS - ( 2020 06:57 )  Alb: 2.7 g/dL / Pro: 6.3 g/dL / ALK PHOS: 149 U/L / ALT: 15 U/L / AST: 30 U/L / GGT: x           PT/INR - ( 2020 08:41 )   PT: 22.7 sec;   INR: 1.93 ratio         PTT - ( 2020 08:41 )  PTT:43.3 sec                            9.0    6.80  )-----------( 93       ( 2020 07:07 )             26.2                         8.7    7.30  )-----------( 97       ( 2020 06:52 )             26.5                         9.2    9.67  )-----------( 108      ( 2020 06:52 )             27.3     Imaging: Impression:     # Septic shock 2/2 Ecoli bacteremia, from urinary source. Urine culture now growing Ecoli ESBL, repeat blood cultures negative.   # Decompensated JEAN Cirrhosis, UNOS listed. Current MELD Na 24 02/20/2020 (same baseline of 25).   -encephalopathy: mental status at baseline, patient is on lactulose and rifaximin at home.   -varices: last EGD showed small varices and GAVE on 12/30  -ascites: trace on CT/exam. History of SBP per chart but not found in [place]. On lasix/aldactone 40/100 at home. No Abx PPX  -HCC not seen on most recent CT.     # DM    Recommendation:  - continue with ertapenem as per ID ending 2/21  - continue with lasix 40mg daily /spironolactone 25mg daily for ascites  - continue with midodrine 20mg q8hr  - continue with lactulose 20mg tid 3-4 bowel movements and rifaximin.   - DVT ppx with lovenox  - continue fluid restriction (1.5L)  - DC planning

## 2020-02-20 NOTE — PROGRESS NOTE ADULT - SUBJECTIVE AND OBJECTIVE BOX
[facility] Division of Hospital Medicine  Eric Hernandez DO  Pager (M-F, 1U-4K): [phone]  Other Times:  [phone]    Patient is a 64y old  Male who presents with a chief complaint of Septic shock secondary to UTI (16 Feb 2020 11:56)      SUBJECTIVE / OVERNIGHT EVENTS:  patient seen and examined at bedside.  feels well   no complaints. eating and drinking well. having reg BMs    MEDICATIONS  (STANDING):  dextrose 5%. 1000 milliLiter(s) (50 mL/Hr) IV Continuous <Continuous>  dextrose 50% Injectable 12.5 Gram(s) IV Push once  dextrose 50% Injectable 25 Gram(s) IV Push once  dextrose 50% Injectable 25 Gram(s) IV Push once  enoxaparin Injectable 40 milliGRAM(s) SubCutaneous daily  ertapenem  IVPB 1000 milliGRAM(s) IV Intermittent every 24 hours  folic acid 1 milliGRAM(s) Oral daily  furosemide    Tablet 20 milliGRAM(s) Oral daily  insulin glargine Injectable (LANTUS) 12 Unit(s) SubCutaneous at bedtime  insulin lispro (HumaLOG) corrective regimen sliding scale   SubCutaneous three times a day before meals  insulin lispro (HumaLOG) corrective regimen sliding scale   SubCutaneous at bedtime  insulin lispro Injectable (HumaLOG) 6 Unit(s) SubCutaneous three times a day with meals  lactulose Syrup 20 Gram(s) Oral three times a day  midodrine 20 milliGRAM(s) Oral every 8 hours  multivitamin 1 Tablet(s) Oral daily  pantoprazole    Tablet 40 milliGRAM(s) Oral two times a day  rifAXIMin 550 milliGRAM(s) Oral two times a day  spironolactone 50 milliGRAM(s) Oral daily    MEDICATIONS  (PRN):  dextrose 40% Gel 15 Gram(s) Oral once PRN Blood Glucose LESS THAN 70 milliGRAM(s)/deciliter  glucagon  Injectable 1 milliGRAM(s) IntraMuscular once PRN Glucose LESS THAN 70 milligrams/deciliter      CAPILLARY BLOOD GLUCOSE      POCT Blood Glucose.: 112 mg/dL (20 Feb 2020 08:45)  POCT Blood Glucose.: 262 mg/dL (19 Feb 2020 21:32)  POCT Blood Glucose.: 227 mg/dL (19 Feb 2020 17:21)  POCT Blood Glucose.: 168 mg/dL (19 Feb 2020 12:13)    I&O's Summary    19 Feb 2020 07:01  -  20 Feb 2020 07:00  --------------------------------------------------------  IN: 520 mL / OUT: 2600 mL / NET: -2080 mL        PHYSICAL EXAM:  Vital Signs Last 24 Hrs  T(C): 36.8 (20 Feb 2020 04:12), Max: 36.8 (20 Feb 2020 04:12)  T(F): 98.2 (20 Feb 2020 04:12), Max: 98.2 (20 Feb 2020 04:12)  HR: 78 (20 Feb 2020 04:12) (75 - 87)  BP: 116/64 (20 Feb 2020 04:12) (111/63 - 116/64)  BP(mean): --  RR: 18 (20 Feb 2020 04:12) (18 - 18)  SpO2: 100% (20 Feb 2020 04:12) (98% - 100%)    PHYSICAL EXAM:  GENERAL: NAD, well-developed  EYES: EOMI, PERRL, icteric sclera   NECK: Supple, No JVD  CHEST/LUNG: Clear to auscultation bilaterally; No wheezes rales or rhonchi  HEART: s1 s2 Regular rate and rhythm; No murmurs, rubs  ABDOMEN: Soft, Nontender, Nondistended; Bowel sounds present. No fluid wave appreciated.   EXTREMITIES:  trace edema b/l. Chronic venous changes. No clubbing noted   L midline catheter + c/d/i  PSYCH: AAOx3      LABS:                        9.0    6.80  )-----------( 93       ( 20 Feb 2020 07:07 )             26.2     02-20    135  |  98  |  23  ----------------------------<  116<H>  3.6   |  27  |  1.13    Ca    9.6      20 Feb 2020 06:57    TPro  6.3  /  Alb  2.7<L>  /  TBili  6.7<H>  /  DBili  x   /  AST  30  /  ALT  15  /  AlkPhos  149<H>  02-20    PT/INR - ( 20 Feb 2020 08:31 )   PT: 22.4 sec;   INR: 1.93 ratio         PTT - ( 20 Feb 2020 08:31 )  PTT:42.5 sec            RADIOLOGY & ADDITIONAL TESTS:  Results Reviewed:   Imaging Personally Reviewed:  Electrocardiogram Personally Reviewed:    COORDINATION OF CARE:  Care Discussed with Consultants/Other Providers [Y/N]:  Prior or Outpatient Records Reviewed [Y/N]:  ZBIGNIEW José

## 2020-02-20 NOTE — PROGRESS NOTE ADULT - PROBLEM SELECTOR PLAN 4
c/w lantus 12uqhs  aiss fs tid ac  A1c 6.4
c/w lantus 12uqhs  humalog 4u tid ac  aiss fs tid ac  A1c 6.4
c/w lantus 12uqhs  humalog 6u tid ac  aiss fs tid ac  A1c 6.4
continue with current insulin regimen as FS within goal

## 2020-02-20 NOTE — PROGRESS NOTE ADULT - PROBLEM SELECTOR PROBLEM 5
Hypertension, unspecified type
MGUS (monoclonal gammopathy of unknown significance)

## 2020-02-20 NOTE — PROGRESS NOTE ADULT - PROBLEM SELECTOR PLAN 6
Transitions of Care Status:  1.  Name of PCP: Dillan Coto  2.  PCP Contacted on Admission: [ ] Y    [x ] N    3.  PCP contacted at Discharge: [ ] Y    [ ] N    [ ] N/A  4.  Post-Discharge Appointment Date and Location:  5.  Summary of Handoff given to PCP:
Transitions of Care Status:  1.  Name of PCP: Dillan Coto  2.  PCP Contacted on Admission: [ ] Y    [x ] N    3.  PCP contacted at Discharge: [ ] Y    [ ] N    [ ] N/A  4.  Post-Discharge Appointment Date and Location:  5.  Summary of Handoff given to PCP:    dc pending home w IV abx and home PT
lovenox

## 2020-02-21 ENCOUNTER — TRANSCRIPTION ENCOUNTER (OUTPATIENT)
Age: 65
End: 2020-02-21

## 2020-02-21 VITALS
TEMPERATURE: 97 F | RESPIRATION RATE: 19 BRPM | OXYGEN SATURATION: 98 % | HEART RATE: 86 BPM | DIASTOLIC BLOOD PRESSURE: 67 MMHG | SYSTOLIC BLOOD PRESSURE: 108 MMHG

## 2020-02-21 LAB
ANION GAP SERPL CALC-SCNC: 10 MMOL/L — SIGNIFICANT CHANGE UP (ref 5–17)
BUN SERPL-MCNC: 22 MG/DL — SIGNIFICANT CHANGE UP (ref 7–23)
CALCIUM SERPL-MCNC: 9.3 MG/DL — SIGNIFICANT CHANGE UP (ref 8.4–10.5)
CHLORIDE SERPL-SCNC: 99 MMOL/L — SIGNIFICANT CHANGE UP (ref 96–108)
CO2 SERPL-SCNC: 28 MMOL/L — SIGNIFICANT CHANGE UP (ref 22–31)
CREAT SERPL-MCNC: 1.15 MG/DL — SIGNIFICANT CHANGE UP (ref 0.5–1.3)
GLUCOSE BLDC GLUCOMTR-MCNC: 121 MG/DL — HIGH (ref 70–99)
GLUCOSE BLDC GLUCOMTR-MCNC: 158 MG/DL — HIGH (ref 70–99)
GLUCOSE SERPL-MCNC: 136 MG/DL — HIGH (ref 70–99)
HCT VFR BLD CALC: 26.3 % — LOW (ref 39–50)
HGB BLD-MCNC: 8.7 G/DL — LOW (ref 13–17)
MCHC RBC-ENTMCNC: 33.1 GM/DL — SIGNIFICANT CHANGE UP (ref 32–36)
MCHC RBC-ENTMCNC: 35.5 PG — HIGH (ref 27–34)
MCV RBC AUTO: 107.3 FL — HIGH (ref 80–100)
NRBC # BLD: 0 /100 WBCS — SIGNIFICANT CHANGE UP (ref 0–0)
PLATELET # BLD AUTO: 91 K/UL — LOW (ref 150–400)
POTASSIUM SERPL-MCNC: 3.8 MMOL/L — SIGNIFICANT CHANGE UP (ref 3.5–5.3)
POTASSIUM SERPL-SCNC: 3.8 MMOL/L — SIGNIFICANT CHANGE UP (ref 3.5–5.3)
RBC # BLD: 2.45 M/UL — LOW (ref 4.2–5.8)
RBC # FLD: 15 % — HIGH (ref 10.3–14.5)
SODIUM SERPL-SCNC: 137 MMOL/L — SIGNIFICANT CHANGE UP (ref 135–145)
WBC # BLD: 6.97 K/UL — SIGNIFICANT CHANGE UP (ref 3.8–10.5)
WBC # FLD AUTO: 6.97 K/UL — SIGNIFICANT CHANGE UP (ref 3.8–10.5)

## 2020-02-21 PROCEDURE — 82962 GLUCOSE BLOOD TEST: CPT

## 2020-02-21 PROCEDURE — 84132 ASSAY OF SERUM POTASSIUM: CPT

## 2020-02-21 PROCEDURE — 71045 X-RAY EXAM CHEST 1 VIEW: CPT

## 2020-02-21 PROCEDURE — 87798 DETECT AGENT NOS DNA AMP: CPT

## 2020-02-21 PROCEDURE — 80048 BASIC METABOLIC PNL TOTAL CA: CPT

## 2020-02-21 PROCEDURE — 82435 ASSAY OF BLOOD CHLORIDE: CPT

## 2020-02-21 PROCEDURE — 99239 HOSP IP/OBS DSCHRG MGMT >30: CPT

## 2020-02-21 PROCEDURE — 90732 PPSV23 VACC 2 YRS+ SUBQ/IM: CPT

## 2020-02-21 PROCEDURE — 96366 THER/PROPH/DIAG IV INF ADDON: CPT | Mod: XU

## 2020-02-21 PROCEDURE — 84300 ASSAY OF URINE SODIUM: CPT

## 2020-02-21 PROCEDURE — 82140 ASSAY OF AMMONIA: CPT

## 2020-02-21 PROCEDURE — 87186 SC STD MICRODIL/AGAR DIL: CPT

## 2020-02-21 PROCEDURE — 80053 COMPREHEN METABOLIC PANEL: CPT

## 2020-02-21 PROCEDURE — 85610 PROTHROMBIN TIME: CPT

## 2020-02-21 PROCEDURE — 87633 RESP VIRUS 12-25 TARGETS: CPT

## 2020-02-21 PROCEDURE — 82803 BLOOD GASES ANY COMBINATION: CPT

## 2020-02-21 PROCEDURE — G0103: CPT

## 2020-02-21 PROCEDURE — 81001 URINALYSIS AUTO W/SCOPE: CPT

## 2020-02-21 PROCEDURE — 82330 ASSAY OF CALCIUM: CPT

## 2020-02-21 PROCEDURE — 87486 CHLMYD PNEUM DNA AMP PROBE: CPT

## 2020-02-21 PROCEDURE — 85014 HEMATOCRIT: CPT

## 2020-02-21 PROCEDURE — 84295 ASSAY OF SERUM SODIUM: CPT

## 2020-02-21 PROCEDURE — 96365 THER/PROPH/DIAG IV INF INIT: CPT | Mod: XU

## 2020-02-21 PROCEDURE — 82248 BILIRUBIN DIRECT: CPT

## 2020-02-21 PROCEDURE — 71260 CT THORAX DX C+: CPT

## 2020-02-21 PROCEDURE — 93005 ELECTROCARDIOGRAM TRACING: CPT

## 2020-02-21 PROCEDURE — 87150 DNA/RNA AMPLIFIED PROBE: CPT

## 2020-02-21 PROCEDURE — 85730 THROMBOPLASTIN TIME PARTIAL: CPT

## 2020-02-21 PROCEDURE — C1751: CPT

## 2020-02-21 PROCEDURE — 87086 URINE CULTURE/COLONY COUNT: CPT

## 2020-02-21 PROCEDURE — 74177 CT ABD & PELVIS W/CONTRAST: CPT

## 2020-02-21 PROCEDURE — 85027 COMPLETE CBC AUTOMATED: CPT

## 2020-02-21 PROCEDURE — 87581 M.PNEUMON DNA AMP PROBE: CPT

## 2020-02-21 PROCEDURE — 99232 SBSQ HOSP IP/OBS MODERATE 35: CPT

## 2020-02-21 PROCEDURE — 97116 GAIT TRAINING THERAPY: CPT

## 2020-02-21 PROCEDURE — 87040 BLOOD CULTURE FOR BACTERIA: CPT

## 2020-02-21 PROCEDURE — 97530 THERAPEUTIC ACTIVITIES: CPT

## 2020-02-21 PROCEDURE — 84100 ASSAY OF PHOSPHORUS: CPT

## 2020-02-21 PROCEDURE — 83735 ASSAY OF MAGNESIUM: CPT

## 2020-02-21 PROCEDURE — 96375 TX/PRO/DX INJ NEW DRUG ADDON: CPT | Mod: XU

## 2020-02-21 PROCEDURE — 99285 EMERGENCY DEPT VISIT HI MDM: CPT | Mod: 25

## 2020-02-21 PROCEDURE — 83605 ASSAY OF LACTIC ACID: CPT

## 2020-02-21 PROCEDURE — P9047: CPT

## 2020-02-21 PROCEDURE — 82947 ASSAY GLUCOSE BLOOD QUANT: CPT

## 2020-02-21 PROCEDURE — 83036 HEMOGLOBIN GLYCOSYLATED A1C: CPT

## 2020-02-21 PROCEDURE — 97161 PT EVAL LOW COMPLEX 20 MIN: CPT

## 2020-02-21 PROCEDURE — 36569 INSJ PICC 5 YR+ W/O IMAGING: CPT

## 2020-02-21 RX ORDER — FUROSEMIDE 40 MG
1 TABLET ORAL
Qty: 15 | Refills: 0
Start: 2020-02-21 | End: 2020-03-21

## 2020-02-21 RX ORDER — LACTULOSE 10 G/15ML
30 SOLUTION ORAL
Qty: 2700 | Refills: 0
Start: 2020-02-21 | End: 2020-03-21

## 2020-02-21 RX ORDER — SPIRONOLACTONE 25 MG/1
2 TABLET, FILM COATED ORAL
Qty: 60 | Refills: 0
Start: 2020-02-21 | End: 2020-03-21

## 2020-02-21 RX ORDER — FUROSEMIDE 40 MG
1 TABLET ORAL
Qty: 30 | Refills: 0
Start: 2020-02-21 | End: 2020-03-21

## 2020-02-21 RX ADMIN — SPIRONOLACTONE 50 MILLIGRAM(S): 25 TABLET, FILM COATED ORAL at 05:50

## 2020-02-21 RX ADMIN — Medication 1: at 11:29

## 2020-02-21 RX ADMIN — PANTOPRAZOLE SODIUM 40 MILLIGRAM(S): 20 TABLET, DELAYED RELEASE ORAL at 05:50

## 2020-02-21 RX ADMIN — Medication 1 TABLET(S): at 11:01

## 2020-02-21 RX ADMIN — ERTAPENEM SODIUM 120 MILLIGRAM(S): 1 INJECTION, POWDER, LYOPHILIZED, FOR SOLUTION INTRAMUSCULAR; INTRAVENOUS at 11:01

## 2020-02-21 RX ADMIN — Medication 6 UNIT(S): at 11:29

## 2020-02-21 RX ADMIN — MIDODRINE HYDROCHLORIDE 20 MILLIGRAM(S): 2.5 TABLET ORAL at 05:50

## 2020-02-21 RX ADMIN — Medication 1 MILLIGRAM(S): at 11:03

## 2020-02-21 RX ADMIN — ENOXAPARIN SODIUM 40 MILLIGRAM(S): 100 INJECTION SUBCUTANEOUS at 11:01

## 2020-02-21 RX ADMIN — Medication 20 MILLIGRAM(S): at 05:50

## 2020-02-21 RX ADMIN — LACTULOSE 20 GRAM(S): 10 SOLUTION ORAL at 05:50

## 2020-02-21 NOTE — PROGRESS NOTE ADULT - ATTENDING COMMENTS
Hepatology Staff: Yomi Medina MD    I saw and examined the patient along with  Dr. Baker 02-21-20.    Vitals: Vital Signs Last 24 Hrs  T(C): 36.6 (21 Feb 2020 03:41), Max: 36.7 (20 Feb 2020 21:36)  T(F): 97.8 (21 Feb 2020 03:41), Max: 98.1 (20 Feb 2020 21:36)  HR: 78 (21 Feb 2020 03:41) (78 - 84)  BP: 105/63 (21 Feb 2020 03:41) (105/60 - 113/77)  RR: 18 (21 Feb 2020 03:41) (18 - 18)  SpO2: 98% (21 Feb 2020 03:41) (98% - 100%)    Labs:Creatinine, Serum: 1.15 mg/dL (02-21-20 @ 07:16)    Imaging Studies:  I/O: I&O's Summary    20 Feb 2020 07:01  -  21 Feb 2020 07:00  --------------------------------------------------------  IN: 800 mL / OUT: 1250 mL / NET: -450 mL    Last 24 hour events:  Stable, no new events. Will complete IV Ertapenem today.    Recommendations: Disposition home possibly today. Follow up in hepatology clinic next week.    Plan discussed with Primary team.

## 2020-02-21 NOTE — PROGRESS NOTE ADULT - ASSESSMENT
Impression:     # Septic shock 2/2 Ecoli bacteremia, from urinary source. Urine culture now growing Ecoli ESBL, repeat blood cultures negative.   # Decompensated JEAN Cirrhosis, UNOS listed. Current MELD Na 24 02/20/2020 (same baseline of 25).   -encephalopathy: mental status at baseline, patient is on lactulose and rifaximin at home.   -varices: last EGD showed small varices and GAVE on 12/30  -ascites: trace on CT/exam. History of SBP per chart but not found in [place]. On lasix/aldactone 40/100 at home. No Abx PPX  -HCC not seen on most recent CT.     # DM    Recommendation:  - continue with ertapenem as per ID ending 2/21  - continue with lasix 40mg daily /spironolactone 25mg daily for ascites  - continue with midodrine 20mg q8hr  - continue with lactulose 20mg tid 3-4 bowel movements and rifaximin.   - DVT ppx with lovenox  - continue fluid restriction (1.5L)  - DC planning Impression:     # Septic shock 2/2 Ecoli bacteremia, from urinary source. Urine culture now growing Ecoli ESBL, repeat blood cultures negative.   # Decompensated JEAN Cirrhosis, UNOS listed. Current MELD Na 24 02/21/2020 (same baseline of 25).   -encephalopathy: mental status at baseline, patient is on lactulose and rifaximin at home.   -varices: last EGD showed small varices and GAVE on 12/30  -ascites: trace on CT/exam. History of SBP per chart but not found in [place]. On lasix/aldactone 40/100 at home. No Abx PPX  -HCC not seen on most recent CT.     # DM    Recommendation:  - continue with ertapenem as per ID ending 2/21  - continue with lasix 40mg daily /spironolactone 25mg daily for ascites  - continue with midodrine 20mg q8hr  - continue with lactulose 20mg tid 3-4 bowel movements and rifaximin.   - DVT ppx with lovenox  - continue fluid restriction (1.5L)  - DC planning

## 2020-02-21 NOTE — DISCHARGE NOTE NURSING/CASE MANAGEMENT/SOCIAL WORK - NSDCVIVACCINE_GEN_ALL_CORE_FT
Influenza , 2019/12/17 10:15 , Rajinder Rivera (RN)  Pneumococcal Conjugate (PCV 13) , 2019/12/16 18:28 , Jodie Serrano (RN)  Pneumococcal polysaccharide (PPSV23) , 2020/2/18 13:31 , Carroll Guardado (RN)

## 2020-02-21 NOTE — CHART NOTE - NSCHARTNOTEFT_GEN_A_CORE
patient seen and examined at bedside. feels well. completing Invanz today. will dc home with opt hepatology follow up. discharge time 45 min. midline will be pulled prior to dc. d/w ZBIGNIEW José patient seen and examined at bedside. feels well. completing Invanz today. will dc home with opt hepatology follow up. discharge time 45 min. midline will be pulled prior to dc. d/w NP Estefanía    Transitions of Care Status:  1.  Name of PCP: Dillan Coto  2.  PCP Contacted on Admission: [ ] Y    [ x] N    3.  PCP contacted at Discharge: [ ] Y    [ x] N    [ ] N/A  4.  Post-Discharge Appointment Date and Location:  5.  Summary of Handoff given to PCP:

## 2020-02-21 NOTE — PROGRESS NOTE ADULT - SUBJECTIVE AND OBJECTIVE BOX
Chief Complaint:  Patient is a 64y old  Male who presents with a chief complaint of Septic shock secondary to UTI (2020 11:56)      Interval Events: Completing course of ertapenem today. No complaints.   ROS: All 12 point system except listed above were otherwise negative.    Allergies:  codeine (Anaphylaxis)        Hospital Medications:  dextrose 40% Gel 15 Gram(s) Oral once PRN  dextrose 5%. 1000 milliLiter(s) IV Continuous <Continuous>  dextrose 50% Injectable 12.5 Gram(s) IV Push once  dextrose 50% Injectable 25 Gram(s) IV Push once  dextrose 50% Injectable 25 Gram(s) IV Push once  enoxaparin Injectable 40 milliGRAM(s) SubCutaneous daily  ertapenem  IVPB 1000 milliGRAM(s) IV Intermittent every 24 hours  folic acid 1 milliGRAM(s) Oral daily  furosemide    Tablet 20 milliGRAM(s) Oral daily  glucagon  Injectable 1 milliGRAM(s) IntraMuscular once PRN  insulin glargine Injectable (LANTUS) 12 Unit(s) SubCutaneous at bedtime  insulin lispro (HumaLOG) corrective regimen sliding scale   SubCutaneous three times a day before meals  insulin lispro (HumaLOG) corrective regimen sliding scale   SubCutaneous at bedtime  insulin lispro Injectable (HumaLOG) 6 Unit(s) SubCutaneous three times a day with meals  lactulose Syrup 20 Gram(s) Oral three times a day  midodrine 20 milliGRAM(s) Oral every 8 hours  multivitamin 1 Tablet(s) Oral daily  pantoprazole    Tablet 40 milliGRAM(s) Oral two times a day  rifAXIMin 550 milliGRAM(s) Oral two times a day  spironolactone 50 milliGRAM(s) Oral daily      PMHX/PSHX:  GIB (gastrointestinal bleeding)  GERD with esophagitis  Hepatic encephalopathy  Obesity  Fatty liver disease, nonalcoholic  Renal stones  Hypertension  Neuropathy  Hypercholesteremia  Diabetes  S/P cholecystectomy  No significant past surgical history      Family history:  Family history of type 2 diabetes mellitus  Family history of hypertension  Family history of stomach cancer  No pertinent family history in first degree relatives    There is no family history of peptic ulcer disease, gastric cancer, colon polyps, colon cancer, celiac disease, biliary, hepatic, or pancreatic disease.  None of the female relatives have breast, uterine, or ovarian cancer.     ROS:   General:  No fevers, chills or night sweats  ENT:  No sore throat or dysphagia  CV:  No pain or palpitations  Resp:  No dyspnea, cough or  wheezing  GI:  as above  Skin:  No rash or edema  Neuro: no weakness   Hematologic: no bleeding  Musculoskeletal: no muscle pain or join pain  Psych: no agitation      PHYSICAL EXAM:   Vital Signs:  Vital Signs Last 24 Hrs  T(C): 36.6 (2020 03:41), Max: 36.7 (2020 21:36)  T(F): 97.8 (2020 03:41), Max: 98.1 (2020 21:36)  HR: 78 (2020 03:41) (78 - 84)  BP: 105/63 (2020 03:41) (105/60 - 113/77)  BP(mean): --  RR: 18 (2020 03:41) (18 - 18)  SpO2: 98% (2020 03:41) (98% - 100%)  Daily     Daily Weight in k.2 (2020 05:52)    GENERAL: NAD, well-developed  EYES: EOMI, PERRL, icteric sclera   NECK: Supple, No JVD  CHEST/LUNG: Clear to auscultation bilaterally; No wheezes rales or rhonchi  HEART: s1 s2 Regular rate and rhythm; No murmurs, rubs  ABDOMEN: Soft, Nontender, Nondistended; Bowel sounds present. No fluid wave appreciated.   EXTREMITIES:  trace edema b/l. Chronic venous changes. No clubbing noted   L midline catheter + c/d/i  PSYCH: AAOx3      LABS:                        9.0    6.80  )-----------( 93       ( 2020 07:07 )             26.2       02-20    135  |  98  |  23  ----------------------------<  116<H>  3.6   |  27  |  1.13    Ca    9.6      2020 06:57    TPro  6.3  /  Alb  2.7<L>  /  TBili  6.7<H>  /  DBili  x   /  AST  30  /  ALT  15  /  AlkPhos  149<H>  02-20    LIVER FUNCTIONS - ( 2020 06:57 )  Alb: 2.7 g/dL / Pro: 6.3 g/dL / ALK PHOS: 149 U/L / ALT: 15 U/L / AST: 30 U/L / GGT: x           PT/INR - ( 2020 08:31 )   PT: 22.4 sec;   INR: 1.93 ratio         PTT - ( 2020 08:31 )  PTT:42.5 sec                            9.0    6.80  )-----------( 93       ( 2020 07:07 )             26.2                         8.7    7.30  )-----------( 97       ( 2020 06:52 )             26.5     Imaging:

## 2020-02-21 NOTE — DISCHARGE NOTE NURSING/CASE MANAGEMENT/SOCIAL WORK - NSDCPNINST_GEN_ALL_CORE
Follow up with your primary doctor and liver (hepatologist) doctor.   Any concerns to follow up with Primary doctor and hepatologist.   Weight daily. Any swelling, abdominal girth increase or increased weight gain to let the primary doctor aware.

## 2020-02-21 NOTE — DISCHARGE NOTE NURSING/CASE MANAGEMENT/SOCIAL WORK - PATIENT PORTAL LINK FT
You can access the FollowMyHealth Patient Portal offered by [facility] by registering at the following website: [email or web address]. By joining [facility]’s FollowMyHealth portal, you will also be able to view your health information using other applications (apps) compatible with our system.

## 2020-02-28 ENCOUNTER — INPATIENT (INPATIENT)
Facility: HOSPITAL | Age: 65
LOS: 3 days | Discharge: HOME HEALTH | DRG: 312 | End: 2020-03-03
Attending: STUDENT IN AN ORGANIZED HEALTH CARE EDUCATION/TRAINING PROGRAM | Admitting: STUDENT IN AN ORGANIZED HEALTH CARE EDUCATION/TRAINING PROGRAM
Payer: MEDICARE

## 2020-02-28 VITALS
HEART RATE: 90 BPM | TEMPERATURE: 98 F | HEIGHT: 73 IN | SYSTOLIC BLOOD PRESSURE: 79 MMHG | DIASTOLIC BLOOD PRESSURE: 44 MMHG | RESPIRATION RATE: 18 BRPM | WEIGHT: 244.93 LBS | OXYGEN SATURATION: 100 %

## 2020-02-28 DIAGNOSIS — K74.60 UNSPECIFIED CIRRHOSIS OF LIVER: ICD-10-CM

## 2020-02-28 DIAGNOSIS — Z90.49 ACQUIRED ABSENCE OF OTHER SPECIFIED PARTS OF DIGESTIVE TRACT: Chronic | ICD-10-CM

## 2020-02-28 DIAGNOSIS — R29.6 REPEATED FALLS: ICD-10-CM

## 2020-02-28 DIAGNOSIS — E11.8 TYPE 2 DIABETES MELLITUS WITH UNSPECIFIED COMPLICATIONS: ICD-10-CM

## 2020-02-28 DIAGNOSIS — Z29.9 ENCOUNTER FOR PROPHYLACTIC MEASURES, UNSPECIFIED: ICD-10-CM
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
## 2020-02-28 DIAGNOSIS — Z02.9 ENCOUNTER FOR ADMINISTRATIVE EXAMINATIONS, UNSPECIFIED: ICD-10-CM

## 2020-02-28 LAB
ALBUMIN SERPL ELPH-MCNC: 2.6 G/DL — LOW (ref 3.3–5)
ALP SERPL-CCNC: 132 U/L — HIGH (ref 40–120)
ALT FLD-CCNC: 19 U/L — SIGNIFICANT CHANGE UP (ref 10–45)
AMMONIA BLD-MCNC: 40 UMOL/L — SIGNIFICANT CHANGE UP (ref 11–55)
ANION GAP SERPL CALC-SCNC: 14 MMOL/L — SIGNIFICANT CHANGE UP (ref 5–17)
APTT BLD: 37.8 SEC — HIGH (ref 27.5–36.3)
AST SERPL-CCNC: 25 U/L — SIGNIFICANT CHANGE UP (ref 10–40)
BASOPHILS # BLD AUTO: 0 K/UL — SIGNIFICANT CHANGE UP (ref 0–0.2)
BASOPHILS NFR BLD AUTO: 0 % — SIGNIFICANT CHANGE UP (ref 0–2)
BILIRUB SERPL-MCNC: 10 MG/DL — HIGH (ref 0.2–1.2)
BUN SERPL-MCNC: 19 MG/DL — SIGNIFICANT CHANGE UP (ref 7–23)
CALCIUM SERPL-MCNC: 9.7 MG/DL — SIGNIFICANT CHANGE UP (ref 8.4–10.5)
CHLORIDE SERPL-SCNC: 94 MMOL/L — LOW (ref 96–108)
CO2 SERPL-SCNC: 22 MMOL/L — SIGNIFICANT CHANGE UP (ref 22–31)
CREAT SERPL-MCNC: 1.1 MG/DL — SIGNIFICANT CHANGE UP (ref 0.5–1.3)
EOSINOPHIL # BLD AUTO: 0.08 K/UL — SIGNIFICANT CHANGE UP (ref 0–0.5)
EOSINOPHIL NFR BLD AUTO: 0.9 % — SIGNIFICANT CHANGE UP (ref 0–6)
FLU A RESULT: SIGNIFICANT CHANGE UP
FLU A RESULT: SIGNIFICANT CHANGE UP
FLUAV AG NPH QL: SIGNIFICANT CHANGE UP
FLUBV AG NPH QL: SIGNIFICANT CHANGE UP
GAS PNL BLDV: SIGNIFICANT CHANGE UP
GLUCOSE SERPL-MCNC: 287 MG/DL — HIGH (ref 70–99)
HCT VFR BLD CALC: 24.9 % — LOW (ref 39–50)
HGB BLD-MCNC: 8.2 G/DL — LOW (ref 13–17)
INR BLD: 2.04 RATIO — HIGH (ref 0.88–1.16)
LYMPHOCYTES # BLD AUTO: 0.38 K/UL — LOW (ref 1–3.3)
LYMPHOCYTES # BLD AUTO: 4.4 % — LOW (ref 13–44)
MCHC RBC-ENTMCNC: 32.9 GM/DL — SIGNIFICANT CHANGE UP (ref 32–36)
MCHC RBC-ENTMCNC: 36 PG — HIGH (ref 27–34)
MCV RBC AUTO: 109.2 FL — HIGH (ref 80–100)
MONOCYTES # BLD AUTO: 0.76 K/UL — SIGNIFICANT CHANGE UP (ref 0–0.9)
MONOCYTES NFR BLD AUTO: 8.7 % — SIGNIFICANT CHANGE UP (ref 2–14)
NEUTROPHILS # BLD AUTO: 7.39 K/UL — SIGNIFICANT CHANGE UP (ref 1.8–7.4)
NEUTROPHILS NFR BLD AUTO: 85.1 % — HIGH (ref 43–77)
PLATELET # BLD AUTO: 75 K/UL — LOW (ref 150–400)
POTASSIUM SERPL-MCNC: 4.2 MMOL/L — SIGNIFICANT CHANGE UP (ref 3.5–5.3)
POTASSIUM SERPL-SCNC: 4.2 MMOL/L — SIGNIFICANT CHANGE UP (ref 3.5–5.3)
PROT SERPL-MCNC: 6.2 G/DL — SIGNIFICANT CHANGE UP (ref 6–8.3)
PROTHROM AB SERPL-ACNC: 24 SEC — HIGH (ref 10–12.9)
RBC # BLD: 2.28 M/UL — LOW (ref 4.2–5.8)
RBC # FLD: 14.9 % — HIGH (ref 10.3–14.5)
RSV RESULT: SIGNIFICANT CHANGE UP
RSV RNA RESP QL NAA+PROBE: SIGNIFICANT CHANGE UP
SODIUM SERPL-SCNC: 130 MMOL/L — LOW (ref 135–145)
TROPONIN T, HIGH SENSITIVITY RESULT: 27 NG/L — SIGNIFICANT CHANGE UP (ref 0–51)
WBC # BLD: 8.68 K/UL — SIGNIFICANT CHANGE UP (ref 3.8–10.5)
WBC # FLD AUTO: 8.68 K/UL — SIGNIFICANT CHANGE UP (ref 3.8–10.5)

## 2020-02-28 PROCEDURE — 99223 1ST HOSP IP/OBS HIGH 75: CPT | Mod: GC

## 2020-02-28 PROCEDURE — 93010 ELECTROCARDIOGRAM REPORT: CPT

## 2020-02-28 PROCEDURE — 99285 EMERGENCY DEPT VISIT HI MDM: CPT

## 2020-02-28 PROCEDURE — 72170 X-RAY EXAM OF PELVIS: CPT | Mod: 26

## 2020-02-28 PROCEDURE — 70450 CT HEAD/BRAIN W/O DYE: CPT | Mod: 26

## 2020-02-28 PROCEDURE — 71250 CT THORAX DX C-: CPT | Mod: 26

## 2020-02-28 RX ORDER — INSULIN LISPRO 100/ML
VIAL (ML) SUBCUTANEOUS AT BEDTIME
Refills: 0 | Status: DISCONTINUED | OUTPATIENT
Start: 2020-02-28 | End: 2020-03-03

## 2020-02-28 RX ORDER — FUROSEMIDE 40 MG
20 TABLET ORAL
Refills: 0 | Status: DISCONTINUED | OUTPATIENT
Start: 2020-02-28 | End: 2020-02-29

## 2020-02-28 RX ORDER — ALBUMIN HUMAN 25 %
500 VIAL (ML) INTRAVENOUS ONCE
Refills: 0 | Status: COMPLETED | OUTPATIENT
Start: 2020-02-28 | End: 2020-02-28

## 2020-02-28 RX ORDER — GLUCAGON INJECTION, SOLUTION 0.5 MG/.1ML
1 INJECTION, SOLUTION SUBCUTANEOUS ONCE
Refills: 0 | Status: DISCONTINUED | OUTPATIENT
Start: 2020-02-28 | End: 2020-03-03

## 2020-02-28 RX ORDER — DEXTROSE 50 % IN WATER 50 %
12.5 SYRINGE (ML) INTRAVENOUS ONCE
Refills: 0 | Status: DISCONTINUED | OUTPATIENT
Start: 2020-02-28 | End: 2020-03-03

## 2020-02-28 RX ORDER — FOLIC ACID 0.8 MG
1 TABLET ORAL DAILY
Refills: 0 | Status: DISCONTINUED | OUTPATIENT
Start: 2020-02-28 | End: 2020-03-03

## 2020-02-28 RX ORDER — DEXTROSE 50 % IN WATER 50 %
15 SYRINGE (ML) INTRAVENOUS ONCE
Refills: 0 | Status: DISCONTINUED | OUTPATIENT
Start: 2020-02-28 | End: 2020-03-03

## 2020-02-28 RX ORDER — DEXTROSE 50 % IN WATER 50 %
25 SYRINGE (ML) INTRAVENOUS ONCE
Refills: 0 | Status: DISCONTINUED | OUTPATIENT
Start: 2020-02-28 | End: 2020-03-03

## 2020-02-28 RX ORDER — PANTOPRAZOLE SODIUM 20 MG/1
40 TABLET, DELAYED RELEASE ORAL
Refills: 0 | Status: DISCONTINUED | OUTPATIENT
Start: 2020-02-28 | End: 2020-03-03

## 2020-02-28 RX ORDER — INSULIN LISPRO 100/ML
VIAL (ML) SUBCUTANEOUS
Refills: 0 | Status: DISCONTINUED | OUTPATIENT
Start: 2020-02-28 | End: 2020-03-03

## 2020-02-28 RX ORDER — FUROSEMIDE 40 MG
40 TABLET ORAL
Refills: 0 | Status: DISCONTINUED | OUTPATIENT
Start: 2020-02-28 | End: 2020-02-29

## 2020-02-28 RX ORDER — INSULIN LISPRO 100/ML
6 VIAL (ML) SUBCUTANEOUS
Refills: 0 | Status: DISCONTINUED | OUTPATIENT
Start: 2020-02-28 | End: 2020-03-02

## 2020-02-28 RX ORDER — LACTULOSE 10 G/15ML
20 SOLUTION ORAL THREE TIMES A DAY
Refills: 0 | Status: DISCONTINUED | OUTPATIENT
Start: 2020-02-28 | End: 2020-03-03

## 2020-02-28 RX ORDER — MIDODRINE HYDROCHLORIDE 2.5 MG/1
20 TABLET ORAL THREE TIMES A DAY
Refills: 0 | Status: DISCONTINUED | OUTPATIENT
Start: 2020-02-28 | End: 2020-03-01

## 2020-02-28 RX ORDER — INSULIN GLARGINE 100 [IU]/ML
12 INJECTION, SOLUTION SUBCUTANEOUS AT BEDTIME
Refills: 0 | Status: DISCONTINUED | OUTPATIENT
Start: 2020-02-28 | End: 2020-03-02

## 2020-02-28 RX ORDER — TAMSULOSIN HYDROCHLORIDE 0.4 MG/1
0.4 CAPSULE ORAL AT BEDTIME
Refills: 0 | Status: DISCONTINUED | OUTPATIENT
Start: 2020-02-28 | End: 2020-03-03

## 2020-02-28 RX ORDER — SODIUM CHLORIDE 9 MG/ML
1000 INJECTION, SOLUTION INTRAVENOUS
Refills: 0 | Status: DISCONTINUED | OUTPATIENT
Start: 2020-02-28 | End: 2020-03-03

## 2020-02-28 RX ORDER — SPIRONOLACTONE 25 MG/1
50 TABLET, FILM COATED ORAL DAILY
Refills: 0 | Status: DISCONTINUED | OUTPATIENT
Start: 2020-02-28 | End: 2020-02-29

## 2020-02-28 RX ADMIN — Medication 6: at 22:26

## 2020-02-28 RX ADMIN — Medication 250 MILLILITER(S): at 16:24

## 2020-02-28 RX ADMIN — TAMSULOSIN HYDROCHLORIDE 0.4 MILLIGRAM(S): 0.4 CAPSULE ORAL at 22:26

## 2020-02-28 RX ADMIN — INSULIN GLARGINE 12 UNIT(S): 100 INJECTION, SOLUTION SUBCUTANEOUS at 22:26

## 2020-02-28 RX ADMIN — MIDODRINE HYDROCHLORIDE 20 MILLIGRAM(S): 2.5 TABLET ORAL at 22:26

## 2020-02-28 RX ADMIN — LACTULOSE 20 GRAM(S): 10 SOLUTION ORAL at 22:26

## 2020-02-28 NOTE — ED PROVIDER NOTE - PROGRESS NOTE DETAILS
ED Sign Out, eval for weakness/fall, pending CT/close reassessments, likely admission as unable to ambulate / optimize medical management -- Surendra Dickerson MD Signed out to Dr. Dickerson pending CT reads, and reevaluation, likely admission

## 2020-02-28 NOTE — ED ADULT TRIAGE NOTE - CHIEF COMPLAINT QUOTE
slipped from bed and fell on his right side, no LOC, also fell yesterday while getting into the shower

## 2020-02-28 NOTE — ED PROVIDER NOTE - OBJECTIVE STATEMENT
MD Neel: 64 year old male past medical history JEAN Cirrhosis, DM, HTN, dyslipidemia, who presents to the ED for increasing weakness. Patient also with increasing falls. Patient has been even falling from sitting position. Only endorsing right lateral rib pain. No fevers, chill, nausea, vomiting, dizziness, lightheadedness, other chest pain, shortness of breath, abdominal pain.

## 2020-02-28 NOTE — H&P ADULT - ASSESSMENT
64 M  with DM2, HLD, HFpEF with mild LV diastolic dysfunction, JEAN cirrhosis complicated by ascites, history of SBP, hepatic encephalopathy, and chronic anemia with a history of duodenal ulcer as well as GAVE and duodenal AVM s/p APC (last on 10/11/19), who is UNOS listed for liver transplantation at [facility], presents from home with frequent falls and admitted for work-up of same complaint.

## 2020-02-28 NOTE — H&P ADULT - HISTORY OF PRESENT ILLNESS
64M with IDDM, dyslipidemia, obesity, GERD, HFpEF with mild LV diastolic dysfunction, and decompensated JEAN cirrhosis complicated by ascites, history of SBP, hepatic encephalopathy, and chronic anemia with a history of duodenal ulcer as well as GAVE and duodenal AVM s/p APC (last on 10/11/19), who is UNOS listed for liver transplantation at [facility] who presents with confusion, weakness, and fall at home found to be in septic shock 2/2 e. coli bacteremia    64 year old male past medical history JEAN Cirrhosis, DM, HTN, dyslipidemia, who presents to the ED for increasing weakness and frequent falls. Also with right sided rib pain. Given history of decompensated cirrhosis concerned for worsening encephalopathy, infection, abdomen ssnt so low suspicion for SBP at this time, acute intracranial pathology, electrolyte derangement. Will check labwork, including ammonia, check CT head, CT chest to r/o fractures, and likely admit.     64 year old male past medical history JEAN Cirrhosis, DM, HTN, dyslipidemia, who presents to the ED for increasing weakness. Patient also with increasing falls. Patient has been even falling from sitting position. Only endorsing right lateral rib pain. No fevers, chill, nausea, vomiting, dizziness, lightheadedness, other chest pain, shortness of breath, abdominal pain. 64 M  with DM2, HLD, HFpEF with mild LV diastolic dysfunction, JEAN cirrhosis complicated by ascites, history of SBP, hepatic encephalopathy, and chronic anemia with a history of duodenal ulcer as well as GAVE and duodenal AVM s/p APC (last on 10/11/19), who is UNOS listed for liver transplantation at [facility], presents from home with frequent falls. Patient was discharge on 2/21 and states that since discharge has fallen everyday. Last admission was for septic shock 2/2 ESBL bacteremia, requiring pressors and MICU admission. Patient and family were concerned for frequent falls. Some of the falls are described as following: patient was sitting on shower stool and suddenly fell. On another occasion he was sitting on his bed and suddenly slipped and fell. Falls have happened while walking on walker too, where he suddenly fell. Patient denies head trauma or LOC, no prodromal symptoms. He reports pain in R rib. Denies confusion, lightheadedness or abdominal pain.    No fevers, chills, recent illness, or sick contacts. In ED, vitals were notable for 79/44. Remaining vitals are wnl. Of note, patient is on midodrine at home. Confirms compliance with medications. 64 M  with DM2, HLD, HFpEF with mild LV diastolic dysfunction, JEAN cirrhosis complicated by ascites, history of SBP, hepatic encephalopathy, and chronic anemia with a history of duodenal ulcer as well as GAVE and duodenal AVM s/p APC (last on 10/11/19), who is UNOS listed for liver transplantation at [facility], presents from home with frequent falls. Patient was discharge on 2/21 and states that since discharge has fallen everyday. Last admission was for septic shock 2/2 ESBL bacteremia, requiring pressors and MICU admission. Patient and family were concerned for frequent falls. Some of the falls are described as following: patient was sitting on shower stool and suddenly fell. On another occasion he was sitting on his bed and suddenly slipped and fell. Falls have happened while walking on walker too, where he suddenly fell. Patient denies head trauma or LOC, no prodromal symptoms. He reports pain in R rib. Denies confusion, lightheadedness or abdominal pain.    No fevers, chills, recent illness, or sick contacts. In ED, vitals were notable for 79/44. Remaining vitals are wnl. Of note, patient is on midodrine at home. Confirms compliance with medications. In ED patient was given 500 cc 5% albumin.

## 2020-02-28 NOTE — ED ADULT NURSE NOTE - OBJECTIVE STATEMENT
65 yo male A&OX3 presents to the ED with the c/o frequent falls. Pt states that he fell this morning onto his r side of body. As per pt's wife at bedside, he has been having frequent falls x 4 days. Pt has abrasions to his head, wrist r knee and l eye s/p falls. Today pt has knee abrasion to the r knee and r lateral chest wall tenderness that radiates to the back. Hx of non alcoholic cirrhosis of the liver, pt appears Jaundice. As per pt he recalls the fall and states that he did not past out or lose consciousness. Pt denies any pain, + weakness no n/v/d. Denies fevers and chills. MAEX4

## 2020-02-28 NOTE — ED PROVIDER NOTE - ATTENDING CONTRIBUTION TO CARE
MD Neel: 64 year old male past medical history JEAN Cirrhosis, DM, HTN, dyslipidemia, who presents to the ED for increasing weakness. Patient also with increasing falls. Patient has been even falling from sitting position. Only endorsing right lateral rib pain. No fevers, chill, nausea, vomiting, dizziness, lightheadedness, other chest pain, shortness of breath, abdominal pain.     Gen: chronically ill appearing, Well Developed, No Acute Distress  HEENT: Normocephalic, Atraumatic, Pupils equal round and reactive to light, Mucous Membranes Dry, Trachea Midline   Cards: Regular Rate and Rhythm, No murmurs, No JVD, non-pitting pedal edema, right lateral chest wall tenderness, no erythema, bruising, crepitus  Pulm: Lungs clear bilaterally, no respiratory distress, no accessory muscle use  Abd: abdomen soft, supple, non-tender, no rebound or guarding, bowel sounds,  normoactive x4, no masses, no pulsatile masses, mild distention,  Ext: moving all extremities, pulses x4 strong and regular  Neuro: AxOx3, no focal neuro deficits, Cn2-12 intact  Psych: normal mood and behavior   Back: no midline tenderness    A/P: 64 year old male past medical history JEAN Cirrhosis, DM, HTN, dyslipidemia, who presents to the ED for increasing weakness and frequent falls. Also with right sided rib pain. Given history of decompensated cirrhosis concerned for worsening encephalopathy, infection, abdomen ssnt so low suspicion for SBP at this time, acute intracranial pathology, electrolyte derangement. Will check labwork, including ammonia, check CT head, CT chest to r/o fractures, and likely admit.   Skin: warm, dry, no rash, jaundiced MD Neel: 64 year old male past medical history JEAN Cirrhosis, DM, HTN, dyslipidemia, who presents to the ED for increasing weakness. Patient also with increasing falls. Patient has been even falling from sitting position. Only endorsing right lateral rib pain. No fevers, chill, nausea, vomiting, dizziness, lightheadedness, other chest pain, shortness of breath, abdominal pain.     Gen: chronically ill appearing, Well Developed, No Acute Distress  HEENT: Normocephalic, Atraumatic, Pupils equal round and reactive to light, Mucous Membranes Dry, Trachea Midline   Cards: Regular Rate and Rhythm, No murmurs, No JVD, non-pitting pedal edema, right lateral chest wall tenderness, no erythema, bruising, crepitus  Pulm: Lungs clear bilaterally, no respiratory distress, no accessory muscle use  Abd: abdomen soft, supple, non-tender, no rebound or guarding, bowel sounds,  normoactive x4, no masses, no pulsatile masses, mild distention,  Ext: moving all extremities, pulses x4 strong and regular  Neuro: AxOx3, no focal neuro deficits, Cn2-12 intact  Psych: normal mood and behavior   Back: no midline tenderness  Skin: warm, dry, no rash, jaundiced    A/P: 64 year old male past medical history JEAN Cirrhosis, DM, HTN, dyslipidemia, who presents to the ED for increasing weakness and frequent falls. Also with right sided rib pain. Given history of decompensated cirrhosis concerned for worsening encephalopathy, infection, abdomen ssnt so low suspicion for SBP at this time, acute intracranial pathology, electrolyte derangement. Will check labwork, including ammonia, check CT head, CT chest to r/o fractures, and likely admit.

## 2020-02-28 NOTE — H&P ADULT - NSHPSOCIALHISTORY_GEN_ALL_CORE
Lives at home with wife and children. Walks with walker. Independent in ADLs prior to this admission. Denies etoh, smoking or recreational drugs.

## 2020-02-28 NOTE — H&P ADULT - NSHPREVIEWOFSYSTEMS_GEN_ALL_CORE
CONSTITUTIONAL: + weakness, no fevers or chills  EYES/ENT: No visual changes;  No vertigo or throat pain   NECK: No pain or stiffness  RESPIRATORY: No cough, wheezing, hemoptysis; No shortness of breath  CARDIOVASCULAR: No chest pain or palpitations  GASTROINTESTINAL: No abdominal or epigastric pain. No nausea, vomiting, or hematemesis; No diarrhea or constipation. No melena or hematochezia.  GENITOURINARY: No dysuria, frequency or hematuria  NEUROLOGICAL: No numbness or weakness  SKIN: No itching, rashes

## 2020-02-28 NOTE — H&P ADULT - NSHPPHYSICALEXAM_GEN_ALL_CORE
Vital Signs Last 24 Hrs  T(C): 36.6 (28 Feb 2020 15:35), Max: 36.6 (28 Feb 2020 13:25)  T(F): 97.9 (28 Feb 2020 15:35), Max: 97.9 (28 Feb 2020 13:25)  HR: 94 (28 Feb 2020 16:25) (90 - 95)  BP: 116/64 (28 Feb 2020 16:25) (79/44 - 116/64)  BP(mean): --  RR: 17 (28 Feb 2020 16:25) (17 - 18)  SpO2: 98% (28 Feb 2020 16:25) (98% - 100%) Vital Signs Last 24 Hrs  T(C): 36.6 (28 Feb 2020 15:35), Max: 36.6 (28 Feb 2020 13:25)  T(F): 97.9 (28 Feb 2020 15:35), Max: 97.9 (28 Feb 2020 13:25)  HR: 94 (28 Feb 2020 16:25) (90 - 95)  BP: 116/64 (28 Feb 2020 16:25) (79/44 - 116/64)  BP(mean): --  RR: 17 (28 Feb 2020 16:25) (17 - 18)  SpO2: 98% (28 Feb 2020 16:25) (98% - 100%)    PHYSICAL EXAM:  GENERAL: NAD, a little poorly kempt, pleasant elderly man, mildly jaundiced  HEAD:  Atraumatic, Normocephalic  EYES: EOMI, mildly icteric sclera  NECK: Supple, No JVD  CHEST/LUNG: Clear to auscultation bilaterally; No wheeze, ronchi or rales, no increased WOB  HEART: Regular rate and rhythm; No murmurs, rubs, or gallops, mildly muted heart sounds  ABDOMEN: Soft, Nontender, Nondistended; Bowel sounds present  EXTREMITIES:  2+ Peripheral Pulses, No clubbing, cyanosis, or edema  PSYCH: AAOx3  NEUROLOGY: no asterixis, 5/5 strength in all extremities, Decreased sensation over R foot, intact sensation over l foot  SKIN: No rashes or lesions

## 2020-02-28 NOTE — H&P ADULT - PROBLEM SELECTOR PLAN 2
- No signs of decompensation from cirrhosis. Continue home medications:  - patient alternated between 40 mg and 40 mg lasix.  - Continue 50 mg spironolactone.  - Continue rifaximin, lactulose, midodrine  - S/p albumin 5%. Will f/u lactate. - No signs of decompensation from cirrhosis. Continue home medications:  - patient alternates between 20 mg and 40 mg lasix.  - Continue 50 mg spironolactone.  - Continue rifaximin, lactulose, midodrine  - S/p albumin 5%. Will f/u lactate.

## 2020-02-28 NOTE — H&P ADULT - PROBLEM SELECTOR PLAN 1
Patient has multiple etiologies for falls: hypotension, weakness/deconditioning from cirrhosis, diabetic neuropathy.  - Continue midodrine  - F/u orthostatic BP.  - F/u PT recs.

## 2020-02-28 NOTE — H&P ADULT - NSHPLABSRESULTS_GEN_ALL_CORE
CBC Full  -  ( 28 Feb 2020 14:20 )  WBC Count : 8.68 K/uL  RBC Count : 2.28 M/uL  Hemoglobin : 8.2 g/dL  Hematocrit : 24.9 %  Platelet Count - Automated : 75 K/uL  Mean Cell Volume : 109.2 fl  Mean Cell Hemoglobin : 36.0 pg  Mean Cell Hemoglobin Concentration : 32.9 gm/dL  Auto Neutrophil # : 7.39 K/uL  Auto Lymphocyte # : 0.38 K/uL  Auto Monocyte # : 0.76 K/uL  Auto Eosinophil # : 0.08 K/uL  Auto Basophil # : 0.00 K/uL  Auto Neutrophil % : 85.1 %  Auto Lymphocyte % : 4.4 %  Auto Monocyte % : 8.7 %  Auto Eosinophil % : 0.9 %  Auto Basophil % : 0.0 %    02-28    130<L>  |  94<L>  |  19  ----------------------------<  287<H>  4.2   |  22  |  1.10    Ca    9.7      28 Feb 2020 14:20    TPro  6.2  /  Alb  2.6<L>  /  TBili  10.0<H>  /  DBili  x   /  AST  25  /  ALT  19  /  AlkPhos  132<H>  02-28 CBC Full  -  ( 28 Feb 2020 14:20 )  WBC Count : 8.68 K/uL  RBC Count : 2.28 M/uL  Hemoglobin : 8.2 g/dL  Hematocrit : 24.9 %  Platelet Count - Automated : 75 K/uL  Mean Cell Volume : 109.2 fl  Mean Cell Hemoglobin : 36.0 pg  Mean Cell Hemoglobin Concentration : 32.9 gm/dL  Auto Neutrophil # : 7.39 K/uL  Auto Lymphocyte # : 0.38 K/uL  Auto Monocyte # : 0.76 K/uL  Auto Eosinophil # : 0.08 K/uL  Auto Basophil # : 0.00 K/uL  Auto Neutrophil % : 85.1 %  Auto Lymphocyte % : 4.4 %  Auto Monocyte % : 8.7 %  Auto Eosinophil % : 0.9 %  Auto Basophil % : 0.0 %    02-28    130<L>  |  94<L>  |  19  ----------------------------<  287<H>  4.2   |  22  |  1.10    Ca    9.7      28 Feb 2020 14:20    TPro  6.2  /  Alb  2.6<L>  /  TBili  10.0<H>  /  DBili  x   /  AST  25  /  ALT  19  /  AlkPhos  132<H>  02-28    CTH: No acute intracranial pathology. No evidence of midline shift, mass effect or intracranial hemorrhage.    CT chest:   Clear lungs.     Xray pelvis:  No fracture or dislocation of the pelvis.    Ekg:  NSR, low voltage

## 2020-02-28 NOTE — H&P ADULT - PROBLEM SELECTOR PLAN 5
Dispo:   1.  Name of PCP:   2.  PCP Contacted on Admission: [ ] Y    [ ] N    3.  PCP contacted at Discharge: [ ] Y    [ ] N    [ ] N/A  4.  Post-Discharge Appointment Date and Location:  5.  Summary of Handoff given to PCP:

## 2020-02-28 NOTE — H&P ADULT - ATTENDING COMMENTS
Patient seen and examined at bedside with resident. Below are my findings and assessment:     64M with PMHx of JEAN (complicated by prior history of encephalopathy, possible SBP, currently on transplant list), GAVE, varices (esophageal?), and T2DM presents to [facility] after feeling increasingly weak for several days. Patient with multiple admissions for the past several months. Patient last here one week prior after being treated for ESBL bacteremia for which he finished complete 10 day course. On admission during that hospitalization patient required brief MICU stay for possible septic shock. Today patient presents because he feels his legs giving out. He was ambulating without a walker, but now requires one. States he feels weak even when sitting. He's not really able to tell me what time of day he feels weak and if there is any relation to his diuretic use. Patient denies lightheadedness, headache, visual disturbance, numbness of feet, fevers, or abdominal distention. In ED patient noted to be hypotensive at 79/44, but given Albumin which improved his BP to 117/56. HR has remained steady in the 90s. Patient seen by me on 6MONTI with no complaints. He's frustrated by medical condition.    Labs/Imaging:  PLT: 75  BUN/Cr: 19/1.10  Coags: 24/2/37.8  TBili: 10    CT head reviewed: no acute intracranial pathology    Assessment/Plan:  Patient with poor long term prognosis. His MELD score is currently 28, though unchanged from prior admissions. He does not appear to be grossly encephalopathic and at this time I do not suspect infection. Given his recent multiple hospitalizations I suspect he may be deconditioned. There is a change that at home his BP fluctuates when he takes Lasix and Aldactone which may be a cause of his unsteadiness. He is also on midodrine which should help. He has worsening macrocytic anemia, likely due to liver disease, but this was not present several months prior. His story does not sound like neuropathy or issues with proprioception. He denies any visual changes. Don't suspect primary neurological issue.    I would continue him on all his medications including Lasix, Aldactone, Midodrine, Rifaxamin, and Lactulose and see monitor his hemodynamics. Continue with insulin and monitor FS. He is not on beta blocker. Physical therapy consult. Hepatology consult.

## 2020-02-28 NOTE — ED PROVIDER NOTE - PHYSICAL EXAMINATION
MD Neel: Gen: chronically ill appearing, Well Developed, No Acute Distress  HEENT: Normocephalic, Atraumatic, Pupils equal round and reactive to light, Mucous Membranes Dry, Trachea Midline   Cards: Regular Rate and Rhythm, No murmurs, No JVD, non-pitting pedal edema, right lateral chest wall tenderness, no erythema, bruising, crepitus  Pulm: Lungs clear bilaterally, no respiratory distress, no accessory muscle use  Abd: abdomen soft, supple, non-tender, no rebound or guarding, bowel sounds,  normoactive x4, no masses, no pulsatile masses, mild distention,  Ext: moving all extremities, pulses x4 strong and regular  Neuro: AxOx3, no focal neuro deficits, Cn2-12 intact  Psych: normal mood and behavior   Back: no midline tenderness  Skin: warm, dry, no rash, jaundiced

## 2020-02-29 LAB
ALBUMIN SERPL ELPH-MCNC: 2.5 G/DL — LOW (ref 3.3–5)
ALP SERPL-CCNC: 131 U/L — HIGH (ref 40–120)
ALT FLD-CCNC: 14 U/L — SIGNIFICANT CHANGE UP (ref 10–45)
ANION GAP SERPL CALC-SCNC: 13 MMOL/L — SIGNIFICANT CHANGE UP (ref 5–17)
AST SERPL-CCNC: 20 U/L — SIGNIFICANT CHANGE UP (ref 10–40)
BILIRUB DIRECT SERPL-MCNC: 2 MG/DL — HIGH (ref 0–0.2)
BILIRUB SERPL-MCNC: 7.9 MG/DL — HIGH (ref 0.2–1.2)
BUN SERPL-MCNC: 17 MG/DL — SIGNIFICANT CHANGE UP (ref 7–23)
CALCIUM SERPL-MCNC: 9.2 MG/DL — SIGNIFICANT CHANGE UP (ref 8.4–10.5)
CHLORIDE SERPL-SCNC: 96 MMOL/L — SIGNIFICANT CHANGE UP (ref 96–108)
CO2 SERPL-SCNC: 23 MMOL/L — SIGNIFICANT CHANGE UP (ref 22–31)
CREAT SERPL-MCNC: 1.03 MG/DL — SIGNIFICANT CHANGE UP (ref 0.5–1.3)
GLUCOSE SERPL-MCNC: 204 MG/DL — HIGH (ref 70–99)
HCT VFR BLD CALC: 22.4 % — LOW (ref 39–50)
HGB BLD-MCNC: 7.5 G/DL — LOW (ref 13–17)
INR BLD: 2.13 RATIO — HIGH (ref 0.88–1.16)
MAGNESIUM SERPL-MCNC: 1.4 MG/DL — LOW (ref 1.6–2.6)
MCHC RBC-ENTMCNC: 33.5 GM/DL — SIGNIFICANT CHANGE UP (ref 32–36)
MCHC RBC-ENTMCNC: 36.1 PG — HIGH (ref 27–34)
MCV RBC AUTO: 107.7 FL — HIGH (ref 80–100)
NRBC # BLD: 0 /100 WBCS — SIGNIFICANT CHANGE UP (ref 0–0)
PHOSPHATE SERPL-MCNC: 2.2 MG/DL — LOW (ref 2.5–4.5)
PLATELET # BLD AUTO: 73 K/UL — LOW (ref 150–400)
POTASSIUM SERPL-MCNC: 3.7 MMOL/L — SIGNIFICANT CHANGE UP (ref 3.5–5.3)
POTASSIUM SERPL-SCNC: 3.7 MMOL/L — SIGNIFICANT CHANGE UP (ref 3.5–5.3)
PROT SERPL-MCNC: 5.8 G/DL — LOW (ref 6–8.3)
PROTHROM AB SERPL-ACNC: 25.1 SEC — HIGH (ref 10–13.1)
RBC # BLD: 2.08 M/UL — LOW (ref 4.2–5.8)
RBC # FLD: 14.7 % — HIGH (ref 10.3–14.5)
SODIUM SERPL-SCNC: 132 MMOL/L — LOW (ref 135–145)
VIT B12 SERPL-MCNC: 1480 PG/ML — HIGH (ref 232–1245)
WBC # BLD: 7.27 K/UL — SIGNIFICANT CHANGE UP (ref 3.8–10.5)
WBC # FLD AUTO: 7.27 K/UL — SIGNIFICANT CHANGE UP (ref 3.8–10.5)

## 2020-02-29 PROCEDURE — 93306 TTE W/DOPPLER COMPLETE: CPT | Mod: 26

## 2020-02-29 PROCEDURE — 99233 SBSQ HOSP IP/OBS HIGH 50: CPT

## 2020-02-29 RX ORDER — MAGNESIUM SULFATE 500 MG/ML
2 VIAL (ML) INJECTION ONCE
Refills: 0 | Status: COMPLETED | OUTPATIENT
Start: 2020-02-29 | End: 2020-02-29

## 2020-02-29 RX ORDER — ALBUMIN HUMAN 25 %
50 VIAL (ML) INTRAVENOUS EVERY 6 HOURS
Refills: 0 | Status: DISCONTINUED | OUTPATIENT
Start: 2020-02-29 | End: 2020-03-02

## 2020-02-29 RX ADMIN — LACTULOSE 20 GRAM(S): 10 SOLUTION ORAL at 05:38

## 2020-02-29 RX ADMIN — Medication 50 GRAM(S): at 10:57

## 2020-02-29 RX ADMIN — LACTULOSE 20 GRAM(S): 10 SOLUTION ORAL at 12:23

## 2020-02-29 RX ADMIN — MIDODRINE HYDROCHLORIDE 20 MILLIGRAM(S): 2.5 TABLET ORAL at 05:38

## 2020-02-29 RX ADMIN — Medication 1 MILLIGRAM(S): at 12:21

## 2020-02-29 RX ADMIN — INSULIN GLARGINE 12 UNIT(S): 100 INJECTION, SOLUTION SUBCUTANEOUS at 23:02

## 2020-02-29 RX ADMIN — Medication 6 UNIT(S): at 10:42

## 2020-02-29 RX ADMIN — MIDODRINE HYDROCHLORIDE 20 MILLIGRAM(S): 2.5 TABLET ORAL at 12:21

## 2020-02-29 RX ADMIN — Medication 8: at 12:17

## 2020-02-29 RX ADMIN — TAMSULOSIN HYDROCHLORIDE 0.4 MILLIGRAM(S): 0.4 CAPSULE ORAL at 23:01

## 2020-02-29 RX ADMIN — Medication 6 UNIT(S): at 18:14

## 2020-02-29 RX ADMIN — Medication 6: at 18:14

## 2020-02-29 RX ADMIN — SPIRONOLACTONE 50 MILLIGRAM(S): 25 TABLET, FILM COATED ORAL at 05:39

## 2020-02-29 RX ADMIN — MIDODRINE HYDROCHLORIDE 20 MILLIGRAM(S): 2.5 TABLET ORAL at 17:39

## 2020-02-29 RX ADMIN — Medication 50 MILLILITER(S): at 23:02

## 2020-02-29 RX ADMIN — Medication 50 MILLILITER(S): at 17:38

## 2020-02-29 RX ADMIN — Medication 2: at 23:03

## 2020-02-29 RX ADMIN — Medication 4: at 10:41

## 2020-02-29 RX ADMIN — Medication 6 UNIT(S): at 12:19

## 2020-02-29 RX ADMIN — LACTULOSE 20 GRAM(S): 10 SOLUTION ORAL at 23:02

## 2020-02-29 RX ADMIN — PANTOPRAZOLE SODIUM 40 MILLIGRAM(S): 20 TABLET, DELAYED RELEASE ORAL at 05:39

## 2020-02-29 NOTE — PROGRESS NOTE ADULT - SUBJECTIVE AND OBJECTIVE BOX
Mohsin Khan, MD  Attending Physician, Division Of Hospital Medicine  Pager: [phone], Office: [phone]  Off hour pager: [phone]    Patient is a 64y old  Male who presents with a chief complaint of Falls     SUBJECTIVE / OVERNIGHT EVENTS:  Seen, examined the patient this am  Resting in bed, c/o LE weakness and falls at home, no dizziness, chest pain  Positive orthostatic, SBP lying 117 to 80s sitting afebrile no diarrhea or abdominal distension      MEDICATIONS  (STANDING):  albumin human 25% IVPB 50 milliLiter(s) IV Intermittent every 6 hours  dextrose 5%. 1000 milliLiter(s) (50 mL/Hr) IV Continuous <Continuous>  dextrose 50% Injectable 12.5 Gram(s) IV Push once  dextrose 50% Injectable 25 Gram(s) IV Push once  dextrose 50% Injectable 25 Gram(s) IV Push once  folic acid 1 milliGRAM(s) Oral daily  insulin glargine Injectable (LANTUS) 12 Unit(s) SubCutaneous at bedtime  insulin lispro (HumaLOG) corrective regimen sliding scale   SubCutaneous three times a day before meals  insulin lispro (HumaLOG) corrective regimen sliding scale   SubCutaneous at bedtime  insulin lispro Injectable (HumaLOG) 6 Unit(s) SubCutaneous three times a day before meals  lactulose Syrup 20 Gram(s) Oral three times a day  midodrine. 20 milliGRAM(s) Oral three times a day  pantoprazole    Tablet 40 milliGRAM(s) Oral before breakfast  rifAXIMin 550 milliGRAM(s) Oral two times a day  tamsulosin 0.4 milliGRAM(s) Oral at bedtime    MEDICATIONS  (PRN):  dextrose 40% Gel 15 Gram(s) Oral once PRN Blood Glucose LESS THAN 70 milliGRAM(s)/deciliter  glucagon  Injectable 1 milliGRAM(s) IntraMuscular once PRN Glucose LESS THAN 70 milligrams/deciliter      Vital Signs Last 24 Hrs  T(C): 36.8 (29 Feb 2020 10:21), Max: 36.9 (28 Feb 2020 20:26)  T(F): 98.2 (29 Feb 2020 10:21), Max: 98.4 (28 Feb 2020 20:26)  HR: 90 (29 Feb 2020 10:21) (90 - 100)  BP: 121/62 (29 Feb 2020 10:21) (79/44 - 124/65)  BP(mean): --  RR: 18 (29 Feb 2020 10:21) (16 - 18)  SpO2: 97% (29 Feb 2020 10:21) (97% - 100%)  CAPILLARY BLOOD GLUCOSE      POCT Blood Glucose.: 394 mg/dL (28 Feb 2020 21:51)    I&O's Summary    28 Feb 2020 07:01  -  29 Feb 2020 07:00  --------------------------------------------------------  IN: 460 mL / OUT: 700 mL / NET: -240 mL        PHYSICAL EXAM:-  GENERAL: NAD, well-developed  EYES: EOMI, PERRLA, conjunctiva and sclera clear  NECK: Supple, No JVD, no thyromegaly  CHEST/LUNG: Clear to auscultation bilaterally; No wheeze  HEART: Regular rate and rhythm; S1, S2 audible, No murmurs, rubs, or gallops  ABDOMEN: Soft, Nontender, Nondistended; Bowel sounds present  EXTREMITIES:  2+ Peripheral Pulses, No clubbing, cyanosis, or edema  NEURO: AAOx3, no focal deficit      LABS:                        7.5    7.27  )-----------( 73       ( 29 Feb 2020 06:57 )             22.4     02-29    132<L>  |  96  |  17  ----------------------------<  204<H>  3.7   |  23  |  1.03    Ca    9.2      29 Feb 2020 07:01  Phos  2.2     02-29  Mg     1.4     02-29    TPro  5.8<L>  /  Alb  2.5<L>  /  TBili  7.9<H>  /  DBili  2.0<H>  /  AST  20  /  ALT  14  /  AlkPhos  131<H>  02-29    PT/INR - ( 29 Feb 2020 10:05 )   PT: 25.1 sec;   INR: 2.13 ratio         PTT - ( 28 Feb 2020 14:20 )  PTT:37.8 sec      RADIOLOGY & ADDITIONAL TESTS:    Imaging Personally Reviewed: CXR

## 2020-02-29 NOTE — PROGRESS NOTE ADULT - PROBLEM SELECTOR PLAN 2
Afebrile, no signs of decompensation from cirrhosis. Abdomen is not distended:  -Held diuretics for positive orthostatic  - Continue rifaximin, lactulose, midodrine

## 2020-02-29 NOTE — PROGRESS NOTE ADULT - ASSESSMENT
64 M  with DM2, HLD, HFpEF with mild LV diastolic dysfunction, JEAN cirrhosis complicated by ascites, history of SBP, hepatic encephalopathy, and chronic anemia with a history of duodenal ulcer as well as GAVE and duodenal AVM s/p APC (last on 10/11/19), who is UNOS listed for liver transplantation at [facility], presents from home with frequent falls and admitted for work-up of same complaint.

## 2020-02-29 NOTE — PROGRESS NOTE ADULT - PROBLEM SELECTOR PLAN 1
Patient has multiple etiologies for falls: positive orthostatic hypotension, weakness/deconditioning from cirrhosis, diabetic neuropathy.  - hold diuretic ( abdomen is not distended), Continue midodrine 20mg tid    Albumin infusion 25% 50ml q 6 hrs for 2 days  - Echo, serum cortisol in am  - Elastic stocking LEGs  - F/u PT recs., fall precaution

## 2020-03-01 LAB
ANION GAP SERPL CALC-SCNC: 10 MMOL/L — SIGNIFICANT CHANGE UP (ref 5–17)
APTT BLD: 43.1 SEC — HIGH (ref 27.5–36.3)
BUN SERPL-MCNC: 15 MG/DL — SIGNIFICANT CHANGE UP (ref 7–23)
CALCIUM SERPL-MCNC: 9.4 MG/DL — SIGNIFICANT CHANGE UP (ref 8.4–10.5)
CHLORIDE SERPL-SCNC: 99 MMOL/L — SIGNIFICANT CHANGE UP (ref 96–108)
CO2 SERPL-SCNC: 24 MMOL/L — SIGNIFICANT CHANGE UP (ref 22–31)
CREAT SERPL-MCNC: 1.12 MG/DL — SIGNIFICANT CHANGE UP (ref 0.5–1.3)
GLUCOSE SERPL-MCNC: 218 MG/DL — HIGH (ref 70–99)
HCT VFR BLD CALC: 23.8 % — LOW (ref 39–50)
HGB BLD-MCNC: 8.1 G/DL — LOW (ref 13–17)
INR BLD: 2.03 RATIO — HIGH (ref 0.88–1.16)
MAGNESIUM SERPL-MCNC: 1.6 MG/DL — SIGNIFICANT CHANGE UP (ref 1.6–2.6)
MCHC RBC-ENTMCNC: 34 GM/DL — SIGNIFICANT CHANGE UP (ref 32–36)
MCHC RBC-ENTMCNC: 36.2 PG — HIGH (ref 27–34)
MCV RBC AUTO: 106.3 FL — HIGH (ref 80–100)
NRBC # BLD: 0 /100 WBCS — SIGNIFICANT CHANGE UP (ref 0–0)
PHOSPHATE SERPL-MCNC: 2.3 MG/DL — LOW (ref 2.5–4.5)
PLATELET # BLD AUTO: 85 K/UL — LOW (ref 150–400)
POTASSIUM SERPL-MCNC: 4.3 MMOL/L — SIGNIFICANT CHANGE UP (ref 3.5–5.3)
POTASSIUM SERPL-SCNC: 4.3 MMOL/L — SIGNIFICANT CHANGE UP (ref 3.5–5.3)
PROTHROM AB SERPL-ACNC: 23.9 SEC — HIGH (ref 10–13.1)
RBC # BLD: 2.24 M/UL — LOW (ref 4.2–5.8)
RBC # FLD: 14.6 % — HIGH (ref 10.3–14.5)
SODIUM SERPL-SCNC: 133 MMOL/L — LOW (ref 135–145)
WBC # BLD: 8.06 K/UL — SIGNIFICANT CHANGE UP (ref 3.8–10.5)
WBC # FLD AUTO: 8.06 K/UL — SIGNIFICANT CHANGE UP (ref 3.8–10.5)

## 2020-03-01 PROCEDURE — 99222 1ST HOSP IP/OBS MODERATE 55: CPT

## 2020-03-01 PROCEDURE — 99233 SBSQ HOSP IP/OBS HIGH 50: CPT

## 2020-03-01 RX ORDER — MIDODRINE HYDROCHLORIDE 2.5 MG/1
30 TABLET ORAL THREE TIMES A DAY
Refills: 0 | Status: DISCONTINUED | OUTPATIENT
Start: 2020-03-01 | End: 2020-03-03

## 2020-03-01 RX ORDER — POLYETHYLENE GLYCOL 3350 17 G/17G
17 POWDER, FOR SOLUTION ORAL DAILY
Refills: 0 | Status: DISCONTINUED | OUTPATIENT
Start: 2020-03-01 | End: 2020-03-03

## 2020-03-01 RX ORDER — SPIRONOLACTONE 25 MG/1
100 TABLET, FILM COATED ORAL DAILY
Refills: 0 | Status: DISCONTINUED | OUTPATIENT
Start: 2020-03-01 | End: 2020-03-01

## 2020-03-01 RX ORDER — SENNA PLUS 8.6 MG/1
2 TABLET ORAL AT BEDTIME
Refills: 0 | Status: DISCONTINUED | OUTPATIENT
Start: 2020-03-01 | End: 2020-03-03

## 2020-03-01 RX ORDER — SPIRONOLACTONE 25 MG/1
50 TABLET, FILM COATED ORAL DAILY
Refills: 0 | Status: DISCONTINUED | OUTPATIENT
Start: 2020-03-01 | End: 2020-03-03

## 2020-03-01 RX ORDER — FUROSEMIDE 40 MG
20 TABLET ORAL DAILY
Refills: 0 | Status: DISCONTINUED | OUTPATIENT
Start: 2020-03-01 | End: 2020-03-03

## 2020-03-01 RX ADMIN — MIDODRINE HYDROCHLORIDE 20 MILLIGRAM(S): 2.5 TABLET ORAL at 05:07

## 2020-03-01 RX ADMIN — Medication 2: at 21:50

## 2020-03-01 RX ADMIN — Medication 50 MILLILITER(S): at 18:05

## 2020-03-01 RX ADMIN — LACTULOSE 20 GRAM(S): 10 SOLUTION ORAL at 13:11

## 2020-03-01 RX ADMIN — Medication 50 MILLILITER(S): at 23:06

## 2020-03-01 RX ADMIN — Medication 50 MILLILITER(S): at 12:17

## 2020-03-01 RX ADMIN — Medication 6: at 18:07

## 2020-03-01 RX ADMIN — Medication 6: at 13:11

## 2020-03-01 RX ADMIN — Medication 50 MILLILITER(S): at 05:07

## 2020-03-01 RX ADMIN — Medication 20 MILLIGRAM(S): at 15:11

## 2020-03-01 RX ADMIN — PANTOPRAZOLE SODIUM 40 MILLIGRAM(S): 20 TABLET, DELAYED RELEASE ORAL at 05:07

## 2020-03-01 RX ADMIN — POLYETHYLENE GLYCOL 3350 17 GRAM(S): 17 POWDER, FOR SOLUTION ORAL at 15:11

## 2020-03-01 RX ADMIN — Medication 6 UNIT(S): at 13:11

## 2020-03-01 RX ADMIN — MIDODRINE HYDROCHLORIDE 30 MILLIGRAM(S): 2.5 TABLET ORAL at 21:51

## 2020-03-01 RX ADMIN — SPIRONOLACTONE 50 MILLIGRAM(S): 25 TABLET, FILM COATED ORAL at 15:11

## 2020-03-01 RX ADMIN — MIDODRINE HYDROCHLORIDE 20 MILLIGRAM(S): 2.5 TABLET ORAL at 13:11

## 2020-03-01 RX ADMIN — TAMSULOSIN HYDROCHLORIDE 0.4 MILLIGRAM(S): 0.4 CAPSULE ORAL at 21:51

## 2020-03-01 RX ADMIN — INSULIN GLARGINE 12 UNIT(S): 100 INJECTION, SOLUTION SUBCUTANEOUS at 21:51

## 2020-03-01 RX ADMIN — Medication 6 UNIT(S): at 09:13

## 2020-03-01 RX ADMIN — LACTULOSE 20 GRAM(S): 10 SOLUTION ORAL at 21:51

## 2020-03-01 RX ADMIN — SENNA PLUS 2 TABLET(S): 8.6 TABLET ORAL at 21:51

## 2020-03-01 RX ADMIN — Medication 1 MILLIGRAM(S): at 12:17

## 2020-03-01 RX ADMIN — Medication 6 UNIT(S): at 18:07

## 2020-03-01 RX ADMIN — LACTULOSE 20 GRAM(S): 10 SOLUTION ORAL at 05:07

## 2020-03-01 RX ADMIN — Medication 4: at 09:13

## 2020-03-01 NOTE — CONSULT NOTE ADULT - ATTENDING COMMENTS
Hepatology Staff: Yomi Medina MD    I saw and examined the patient along with  Dr. Guardado 03-01-20.    Vitals: Vital Signs Last 24 Hrs  T(C): 37.1 (01 Mar 2020 04:59), Max: 37.4 (29 Feb 2020 20:59)  T(F): 98.8 (01 Mar 2020 04:59), Max: 99.3 (29 Feb 2020 20:59)  HR: 89 (01 Mar 2020 04:59) (89 - 92)  BP: 111/62 (01 Mar 2020 04:59) (111/62 - 135/67)  RR: 18 (01 Mar 2020 04:59) (18 - 18)  SpO2: 97% (01 Mar 2020 04:59) (97% - 98%)    Medications:  IV Fluids: albumin human 25% IVPB 50 milliLiter(s) IV Intermittent every 6 hours      Labs:Creatinine, Serum: 1.12 mg/dL (03-01-20 @ 06:31)    MELD Score:   Imaging Studies:  I/O: I&O's Summary    29 Feb 2020 07:01  -  01 Mar 2020 07:00  --------------------------------------------------------  IN: 660 mL / OUT: 1050 mL / NET: -390 mL    Recommendations: Noted orthostatic symptoms . Recommend lowering diuretics to Lasix 20 and Aldactone 50 mg daily. Increase Midodrine 30 mg tid. Keep on IV Albumin 25% 25 gm q 8hrs.    Plan discussed with Primary team.

## 2020-03-01 NOTE — PROGRESS NOTE ADULT - PROBLEM SELECTOR PLAN 2
Afebrile, no signs of decompensation from cirrhosis. Abdomen is not distended:  - resumed low dose diuretics per Hepatology- Lasix 20mg, aldactone 50mg  - Continue rifaximin 500mg bid, lactulose 20gm tid, increase midodrine 30mg tid    added senna and Miralax for constipation

## 2020-03-01 NOTE — PROGRESS NOTE ADULT - PROBLEM SELECTOR PLAN 1
Patient has multiple etiologies for falls: positive orthostatic hypotension, weakness/deconditioning from cirrhosis, diabetic neuropathy.  - Yesterday held diuretics ( abdomen is not distended), but hepatology recommended to c/w Lasix 20mg, aldactone 50mg and increase midodrine 30mg tid  - c/w Albumin infusion 25% 50ml q 6 hrs   - Echo, serum cortisol in am  - Elastic stocking LEGs  - PT recs., fall precaution

## 2020-03-01 NOTE — CONSULT NOTE ADULT - ASSESSMENT
Impression:    #JEAN Cirrhosis: UNOS listed for liver transplant. MELD-Na 24 02/29/20  Decompensated by esophageal varices, ascites, h/o hepatic encephalopathy and h/o SBP.   -Ascites: Trace on CT abdomen/pelvis from 02/11/20  -Varices: Small non-bleeding EV on EGD from 12/2019.  -HCC: No focal liver lesions on MRI abdomen 12/2019.   -PSE: No asterixis.     #Mechanical falls: Differential includes hepatic encephalopathy   TTE unremarkable this admission    Recommendations: Impression:    #JEAN Cirrhosis: UNOS listed for liver transplant. MELD-Na 24 02/29/20  Decompensated by esophageal varices, ascites, h/o hepatic encephalopathy and h/o SBP.   -Ascites: Trace on CT abdomen/pelvis from 02/11/20  -Varices: Small non-bleeding EV on EGD from 12/2019.  -HCC: No focal liver lesions on MRI abdomen 12/2019.   -PSE: No asterixis.     #Mechanical falls: Differential includes deconditioning vs. diabetic neuropathy; positive orthostatic vital signs this admission, so diuretics currently held.     -TTE unremarkable this admission, less likely cardiac cause. CT head without acute neurologic findings.   -Mental status at baseline on lactulose    Recommendations:  - Impression:    #JEAN Cirrhosis: UNOS listed for liver transplant. MELD-Na 24 02/29/20  Decompensated by esophageal varices, ascites, h/o hepatic encephalopathy and h/o SBP.   -Ascites: Trace on CT abdomen/pelvis from 02/11/20  -Varices: Small non-bleeding EV on EGD from 12/2019.  -HCC: No focal liver lesions on MRI abdomen 12/2019.   -PSE: No asterixis.     #Mechanical falls: Differential includes deconditioning vs. diabetic neuropathy; positive orthostatic vital signs this admission, so diuretics currently held.     -TTE unremarkable this admission, less likely cardiac cause. CT head without acute neurologic findings.   -Mental status at baseline on lactulose    Recommendations:  - reduce diuretic dose to Lasix 20 mg daily and spironolactone 50 mg daily  - agree with albumin 25% every 8 hours to augment blood volume  - increase midodrine to 30 mg daily TID  - patient does not appear toxic/confused - no infectious work up indicated       Vivian Guardado PGY-4  Gastroenterology Fellow  Pager #[phone] or [phone]

## 2020-03-01 NOTE — PHYSICAL THERAPY INITIAL EVALUATION ADULT - ADDITIONAL COMMENTS
As per the pt he lives in a pvt house w/ family, 3STE, +HR, PTA pt was Independent in AdLs/functional ambulation using RW. Pt had repeated falls at home since last d/c.

## 2020-03-01 NOTE — PROGRESS NOTE ADULT - ASSESSMENT
64 M  with DM2, HLD, HFpEF with mild LV diastolic dysfunction, JEAN cirrhosis complicated by ascites, history of SBP, hepatic encephalopathy, and chronic anemia with a history of duodenal ulcer as well as GAVE and duodenal AVM s/p APC (last on 10/11/19), who is UNOS listed for liver transplantation at [facility], presents from home with frequent falls, found to be orthostatic on diuretics and Midodrine.

## 2020-03-01 NOTE — PHYSICAL THERAPY INITIAL EVALUATION ADULT - PERTINENT HX OF CURRENT PROBLEM, REHAB EVAL
64 M  with DM2, HLD, HFpEF with mild LV diastolic dysfunction, JEAN cirrhosis complicated by ascites, history of SBP, hepatic encephalopathy, and chronic anemia with a history of duodenal ulcer as well as GAVE and duodenal AVM s/p APC (last on 10/11/19), who is UNOS listed for liver transplantation at [facility], presents from home with frequent falls. CTH and xray pelvis/femur shows acute pathology or no fx dislocation. Lab shows decreased h/h,

## 2020-03-01 NOTE — PHYSICAL THERAPY INITIAL EVALUATION ADULT - DISCHARGE DISPOSITION, PT EVAL
rehabilitation facility/[facility], If the pt want to go home, pt will require assistance for Adls/functional mobility. Recommend home PT for safety assessment and to improve his strength, balance, gait performance for safe return to his PLOF

## 2020-03-01 NOTE — PROGRESS NOTE ADULT - SUBJECTIVE AND OBJECTIVE BOX
Mohsin Khan, MD  Attending Physician, Division Of Hospital Medicine  Pager: [phone], Office: [phone]  Off hour pager: [phone]    Patient is a 64y old  Male who presents with a chief complaint of Falls    SUBJECTIVE / OVERNIGHT EVENTS:  Seen, examined the patient this afternoon around 1:30p  Resting in bed, feels weeks and c/o constipation. No chest pain, SOB but dizzy upon standing  SBP ,       MEDICATIONS  (STANDING):  albumin human 25% IVPB 50 milliLiter(s) IV Intermittent every 6 hours  dextrose 5%. 1000 milliLiter(s) (50 mL/Hr) IV Continuous <Continuous>  dextrose 50% Injectable 12.5 Gram(s) IV Push once  dextrose 50% Injectable 25 Gram(s) IV Push once  dextrose 50% Injectable 25 Gram(s) IV Push once  folic acid 1 milliGRAM(s) Oral daily  furosemide    Tablet 20 milliGRAM(s) Oral daily  insulin glargine Injectable (LANTUS) 12 Unit(s) SubCutaneous at bedtime  insulin lispro (HumaLOG) corrective regimen sliding scale   SubCutaneous three times a day before meals  insulin lispro (HumaLOG) corrective regimen sliding scale   SubCutaneous at bedtime  insulin lispro Injectable (HumaLOG) 6 Unit(s) SubCutaneous three times a day before meals  lactulose Syrup 20 Gram(s) Oral three times a day  midodrine. 30 milliGRAM(s) Oral three times a day  pantoprazole    Tablet 40 milliGRAM(s) Oral before breakfast  polyethylene glycol 3350 17 Gram(s) Oral daily  rifAXIMin 550 milliGRAM(s) Oral two times a day  senna 2 Tablet(s) Oral at bedtime  spironolactone 50 milliGRAM(s) Oral daily  tamsulosin 0.4 milliGRAM(s) Oral at bedtime    MEDICATIONS  (PRN):  dextrose 40% Gel 15 Gram(s) Oral once PRN Blood Glucose LESS THAN 70 milliGRAM(s)/deciliter  glucagon  Injectable 1 milliGRAM(s) IntraMuscular once PRN Glucose LESS THAN 70 milligrams/deciliter      Vital Signs Last 24 Hrs  T(C): 36.6 (01 Mar 2020 09:00), Max: 37.4 (29 Feb 2020 20:59)  T(F): 97.9 (01 Mar 2020 09:00), Max: 99.3 (29 Feb 2020 20:59)  HR: 93 (01 Mar 2020 13:14) (88 - 93)  BP: 80/43 (01 Mar 2020 13:14) (80/43 - 135/67)  BP(mean): --  RR: 18 (01 Mar 2020 09:00) (18 - 18)  SpO2: 95% (01 Mar 2020 13:13) (95% - 98%)  CAPILLARY BLOOD GLUCOSE      POCT Blood Glucose.: 263 mg/dL (01 Mar 2020 12:57)  POCT Blood Glucose.: 214 mg/dL (01 Mar 2020 09:11)  POCT Blood Glucose.: 253 mg/dL (29 Feb 2020 23:00)  POCT Blood Glucose.: 268 mg/dL (29 Feb 2020 21:38)  POCT Blood Glucose.: 274 mg/dL (29 Feb 2020 18:12)    I&O's Summary    29 Feb 2020 07:01  -  01 Mar 2020 07:00  --------------------------------------------------------  IN: 660 mL / OUT: 1050 mL / NET: -390 mL        PHYSICAL EXAM:-  GENERAL: NAD, well-developed  EYES: EOMI, PERRLA, conjunctiva and sclera clear  NECK: Supple, No JVD, no thyromegaly  CHEST/LUNG: Clear to auscultation bilaterally; No wheeze  HEART: Regular rate and rhythm; S1, S2 audible, No murmurs, rubs, or gallops  ABDOMEN: Soft, Nontender, Nondistended; Bowel sounds present  EXTREMITIES:  2+ Peripheral Pulses, No clubbing, cyanosis, or edema  NEURO: AAOx3, no focal deficit      LABS:                        8.1    8.06  )-----------( 85       ( 01 Mar 2020 06:31 )             23.8     03-01    133<L>  |  99  |  15  ----------------------------<  218<H>  4.3   |  24  |  1.12    Ca    9.4      01 Mar 2020 06:31  Phos  2.3     03-01  Mg     1.6     03-01    TPro  5.8<L>  /  Alb  2.5<L>  /  TBili  7.9<H>  /  DBili  2.0<H>  /  AST  20  /  ALT  14  /  AlkPhos  131<H>  02-29    PT/INR - ( 01 Mar 2020 09:07 )   PT: 23.9 sec;   INR: 2.03 ratio         PTT - ( 01 Mar 2020 09:07 )  PTT:43.1 sec    RADIOLOGY & ADDITIONAL TESTS:    Imaging Personally Reviewed: CXR  Consultant(s) Notes Reviewed: Hepatology  Care Discussed with Consultants/Other Providers: Hepatology

## 2020-03-01 NOTE — CONSULT NOTE ADULT - SUBJECTIVE AND OBJECTIVE BOX
Chief Complaint:  Patient is a 64y old  Male who presents with a chief complaint of Falls (29 Feb 2020 12:08)      HPI:    64M with PMHx of JEAN cirrhosis (decompensated by h/o SBP, ascites, esophageal varices and PSE) UNOS listed for liver transplantation, h/o PUD (duodenal ulcer), GAVE, chronic anemia and DM2 who presents from home for frequent falls. Of note, patient had recent admission 2/11/20-2/21/20 for septic shock secondary to cystitis and SBP. Since his discharge, he reports daily falls without prodromal symptoms (dizziness, nausea, weakness) or loss of consciousness. He admits to progressive weakness       Allergies:  codeine (Anaphylaxis)      Home Medications:    · 	lactulose 10 g/15 mL oral syrup: Last Dose Taken:  , 30 milliliter(s) orally 3 times a day  · 	furosemide 40 mg oral tablet: Last Dose Taken:  , 1 tab(s) orally every other day   	starting 2/22 saturday monday/wednesday/friday/saturday   · 	furosemide 20 mg oral tablet: Last Dose Taken:  , 1 tab(s) orally every other day starting 2/23  	every kasey tuesday and thrusday  	  · 	spironolactone 25 mg oral tablet: Last Dose Taken:  , 2 tab(s) orally once a day  · 	insulin glargine: Last Dose Taken:  , 12 unit(s) subcutaneous once a day (at bedtime)  · 	Multiple Vitamins oral tablet: Last Dose Taken:  , 1 tab(s) orally once a day  · 	pantoprazole 40 mg oral delayed release tablet: Last Dose Taken:  , 1 tab(s) orally 2 times a day   · 	folic acid 1 mg oral tablet: Last Dose Taken:  , 1 tab(s) orally once a day  · 	midodrine 10 mg oral tablet: Last Dose Taken:  , 2 tab(s) orally 3 times a day  · 	insulin lispro: Last Dose Taken:  , 6 unit(s) subcutaneous 3 times a day (before meals)  · 	rifAXIMin 550 mg oral tablet: Last Dose Taken:  , 1 tab(s) orally 2 times a day  · 	tamsulosin 0.4 mg oral capsule: Last Dose Taken:  , 1 cap(s) orally once a day (at bedtime)      Hospital Medications:  albumin human 25% IVPB 50 milliLiter(s) IV Intermittent every 6 hours  dextrose 40% Gel 15 Gram(s) Oral once PRN  dextrose 5%. 1000 milliLiter(s) IV Continuous <Continuous>  dextrose 50% Injectable 12.5 Gram(s) IV Push once  dextrose 50% Injectable 25 Gram(s) IV Push once  dextrose 50% Injectable 25 Gram(s) IV Push once  folic acid 1 milliGRAM(s) Oral daily  glucagon  Injectable 1 milliGRAM(s) IntraMuscular once PRN  insulin glargine Injectable (LANTUS) 12 Unit(s) SubCutaneous at bedtime  insulin lispro (HumaLOG) corrective regimen sliding scale   SubCutaneous three times a day before meals  insulin lispro (HumaLOG) corrective regimen sliding scale   SubCutaneous at bedtime  insulin lispro Injectable (HumaLOG) 6 Unit(s) SubCutaneous three times a day before meals  lactulose Syrup 20 Gram(s) Oral three times a day  midodrine. 20 milliGRAM(s) Oral three times a day  pantoprazole    Tablet 40 milliGRAM(s) Oral before breakfast  rifAXIMin 550 milliGRAM(s) Oral two times a day  tamsulosin 0.4 milliGRAM(s) Oral at bedtime      PMHX/PSHX:  GIB (gastrointestinal bleeding)  GERD with esophagitis  Hepatic encephalopathy  Obesity  Fatty liver disease, nonalcoholic  Renal stones  Hypertension  Neuropathy  Hypercholesteremia  Diabetes  S/P cholecystectomy  No significant past surgical history      Family history:  Family history of type 2 diabetes mellitus  Family history of hypertension  Family history of stomach cancer  No pertinent family history in first degree relatives      Social History:     ROS:     General:  No weight loss, fevers, chills, night sweats, fatigue  Eyes:  No vision changes, no yellowing of eyes   ENT:  No throat pain, runny nose  CV:  No chest pain, palpitations  Resp:  No SOB, cough, wheezing  GI:  See HPI  :  No burning with urination, no hematuria   Muscle:  No muscle pain, weakness  Neuro:  No numbness/tingling, memory problems  Psych:  No fatigue, insomnia, mood problems  Heme:  No easy bruisability  Skin:  No rash, itching       PHYSICAL EXAM:     GENERAL:  Appears stated age, well-groomed, well-nourished, no distress  HEENT:  NC/AT,  conjunctivae clear and pink,  no JVD  CHEST:  Full & symmetric excursion, no increased effort, breath sounds clear  HEART:  Regular rhythm, S1, S2, no murmur/rub/S3/S4, no abdominal bruit, no edema  ABDOMEN:  Soft, non-tender, non-distended, normoactive bowel sounds,  no masses ,  EXTREMITIES:  no cyanosis,clubbing or edema  SKIN:  No rash/erythema/ecchymoses/petechiae/wounds/abscess/warm/dry  NEURO:  Alert, oriented    Vital Signs:  Vital Signs Last 24 Hrs  T(C): 37.1 (01 Mar 2020 04:59), Max: 37.4 (29 Feb 2020 20:59)  T(F): 98.8 (01 Mar 2020 04:59), Max: 99.3 (29 Feb 2020 20:59)  HR: 89 (01 Mar 2020 04:59) (89 - 92)  BP: 111/62 (01 Mar 2020 04:59) (111/62 - 135/67)  BP(mean): --  RR: 18 (01 Mar 2020 04:59) (18 - 18)  SpO2: 97% (01 Mar 2020 04:59) (97% - 98%)  Daily     Daily     LABS:                        7.5    7.27  )-----------( 73       ( 29 Feb 2020 06:57 )             22.4     02-29    132<L>  |  96  |  17  ----------------------------<  204<H>  3.7   |  23  |  1.03    Ca    9.2      29 Feb 2020 07:01  Phos  2.2     02-29  Mg     1.4     02-29    TPro  5.8<L>  /  Alb  2.5<L>  /  TBili  7.9<H>  /  DBili  2.0<H>  /  AST  20  /  ALT  14  /  AlkPhos  131<H>  02-29    LIVER FUNCTIONS - ( 29 Feb 2020 07:01 )  Alb: 2.5 g/dL / Pro: 5.8 g/dL / ALK PHOS: 131 U/L / ALT: 14 U/L / AST: 20 U/L / GGT: x           PT/INR - ( 29 Feb 2020 10:05 )   PT: 25.1 sec;   INR: 2.13 ratio         PTT - ( 28 Feb 2020 14:20 )  PTT:37.8 sec        Imaging: Chief Complaint:  Patient is a 64y old  Male who presents with a chief complaint of Falls (29 Feb 2020 12:08)      HPI:    64M with PMHx of JEAN cirrhosis (decompensated by h/o SBP, ascites, esophageal varices and PSE) UNOS listed for liver transplantation, h/o PUD (duodenal ulcer), GAVE, chronic anemia and DM2 who presents from home for frequent falls. Of note, patient had recent admission 2/11/20-2/21/20 for septic shock secondary to cystitis and SBP. Since his discharge, he reports daily falls without prodromal symptoms (dizziness, nausea, weakness) or loss of consciousness but admits to hitting his head. He describes weakness and his legs giving out beneath him suddenly. He denies confusion, leg swelling, abdominal swelling, fevers, chills, diarrhea, nausea, vomiting or sick contacts. He was found to have orthostatic vital signs and his diuretics are currently held.       Allergies:  codeine (Anaphylaxis)      Home Medications:    · 	lactulose 10 g/15 mL oral syrup: Last Dose Taken:  , 30 milliliter(s) orally 3 times a day  · 	furosemide 40 mg oral tablet: Last Dose Taken:  , 1 tab(s) orally every other day   	starting 2/22 saturday monday/wednesday/friday/saturday   · 	furosemide 20 mg oral tablet: Last Dose Taken:  , 1 tab(s) orally every other day starting 2/23  	every kasey tuesday and thrusday  	  · 	spironolactone 25 mg oral tablet: Last Dose Taken:  , 2 tab(s) orally once a day  · 	insulin glargine: Last Dose Taken:  , 12 unit(s) subcutaneous once a day (at bedtime)  · 	Multiple Vitamins oral tablet: Last Dose Taken:  , 1 tab(s) orally once a day  · 	pantoprazole 40 mg oral delayed release tablet: Last Dose Taken:  , 1 tab(s) orally 2 times a day   · 	folic acid 1 mg oral tablet: Last Dose Taken:  , 1 tab(s) orally once a day  · 	midodrine 10 mg oral tablet: Last Dose Taken:  , 2 tab(s) orally 3 times a day  · 	insulin lispro: Last Dose Taken:  , 6 unit(s) subcutaneous 3 times a day (before meals)  · 	rifAXIMin 550 mg oral tablet: Last Dose Taken:  , 1 tab(s) orally 2 times a day  · 	tamsulosin 0.4 mg oral capsule: Last Dose Taken:  , 1 cap(s) orally once a day (at bedtime)      Hospital Medications:  albumin human 25% IVPB 50 milliLiter(s) IV Intermittent every 6 hours  dextrose 40% Gel 15 Gram(s) Oral once PRN  dextrose 5%. 1000 milliLiter(s) IV Continuous <Continuous>  dextrose 50% Injectable 12.5 Gram(s) IV Push once  dextrose 50% Injectable 25 Gram(s) IV Push once  dextrose 50% Injectable 25 Gram(s) IV Push once  folic acid 1 milliGRAM(s) Oral daily  glucagon  Injectable 1 milliGRAM(s) IntraMuscular once PRN  insulin glargine Injectable (LANTUS) 12 Unit(s) SubCutaneous at bedtime  insulin lispro (HumaLOG) corrective regimen sliding scale   SubCutaneous three times a day before meals  insulin lispro (HumaLOG) corrective regimen sliding scale   SubCutaneous at bedtime  insulin lispro Injectable (HumaLOG) 6 Unit(s) SubCutaneous three times a day before meals  lactulose Syrup 20 Gram(s) Oral three times a day  midodrine. 20 milliGRAM(s) Oral three times a day  pantoprazole    Tablet 40 milliGRAM(s) Oral before breakfast  rifAXIMin 550 milliGRAM(s) Oral two times a day  tamsulosin 0.4 milliGRAM(s) Oral at bedtime      PMHX/PSHX:  GIB (gastrointestinal bleeding)  GERD with esophagitis  Hepatic encephalopathy  Obesity  Fatty liver disease, nonalcoholic  Renal stones  Hypertension  Neuropathy  Hypercholesteremia  Diabetes  S/P cholecystectomy  No significant past surgical history      Family history:  Family history of type 2 diabetes mellitus  Family history of hypertension  Family history of stomach cancer  No pertinent family history in first degree relatives      Social History:     ROS:     General:  No weight loss, fevers, chills, night sweats, fatigue  Eyes:  No vision changes, no yellowing of eyes   ENT:  No throat pain, runny nose  CV:  No chest pain, palpitations  Resp:  No SOB, cough, wheezing  GI:  See HPI  :  No burning with urination, no hematuria   Muscle:  No muscle pain, weakness  Neuro:  No numbness/tingling, memory problems  Psych:  No fatigue, insomnia, mood problems  Heme:  No easy bruisability  Skin:  No rash, itching       PHYSICAL EXAM:     GENERAL:  Sleeping but arousable, non-toxic, well developed  HEENT:  Conjunctivae clear and pink +L periorbital ecchymoses   CHEST:  Full & symmetric excursion, no increased effort   HEART:  Regular rhythm & rate   ABDOMEN:  Soft, non-tender, non-distended, obese  EXTREMITIES:  no LE edema   SKIN:  No rash/erythema/ecchymoses/petechiae/jaundice   NEURO:  Alert, orientedx3    Vital Signs:  Vital Signs Last 24 Hrs  T(C): 37.1 (01 Mar 2020 04:59), Max: 37.4 (29 Feb 2020 20:59)  T(F): 98.8 (01 Mar 2020 04:59), Max: 99.3 (29 Feb 2020 20:59)  HR: 89 (01 Mar 2020 04:59) (89 - 92)  BP: 111/62 (01 Mar 2020 04:59) (111/62 - 135/67)  BP(mean): --  RR: 18 (01 Mar 2020 04:59) (18 - 18)  SpO2: 97% (01 Mar 2020 04:59) (97% - 98%)  Daily     Daily     LABS:                        7.5    7.27  )-----------( 73       ( 29 Feb 2020 06:57 )             22.4     02-29    132<L>  |  96  |  17  ----------------------------<  204<H>  3.7   |  23  |  1.03    Ca    9.2      29 Feb 2020 07:01  Phos  2.2     02-29  Mg     1.4     02-29    TPro  5.8<L>  /  Alb  2.5<L>  /  TBili  7.9<H>  /  DBili  2.0<H>  /  AST  20  /  ALT  14  /  AlkPhos  131<H>  02-29    LIVER FUNCTIONS - ( 29 Feb 2020 07:01 )  Alb: 2.5 g/dL / Pro: 5.8 g/dL / ALK PHOS: 131 U/L / ALT: 14 U/L / AST: 20 U/L / GGT: x           PT/INR - ( 29 Feb 2020 10:05 )   PT: 25.1 sec;   INR: 2.13 ratio    PTT - ( 28 Feb 2020 14:20 )  PTT:37.8 sec    Imaging:

## 2020-03-02 ENCOUNTER — TRANSCRIPTION ENCOUNTER (OUTPATIENT)
Age: 65
End: 2020-03-02

## 2020-03-02 ENCOUNTER — APPOINTMENT (OUTPATIENT)
Dept: HEPATOLOGY | Facility: CLINIC | Age: 65
End: 2020-03-02

## 2020-03-02 LAB
ALBUMIN SERPL ELPH-MCNC: 2.9 G/DL — LOW (ref 3.3–5)
ALP SERPL-CCNC: 162 U/L — HIGH (ref 40–120)
ALT FLD-CCNC: 14 U/L — SIGNIFICANT CHANGE UP (ref 10–45)
ANION GAP SERPL CALC-SCNC: 9 MMOL/L — SIGNIFICANT CHANGE UP (ref 5–17)
AST SERPL-CCNC: 26 U/L — SIGNIFICANT CHANGE UP (ref 10–40)
BILIRUB DIRECT SERPL-MCNC: 1.7 MG/DL — HIGH (ref 0–0.2)
BILIRUB INDIRECT FLD-MCNC: 5.4 MG/DL — HIGH (ref 0.2–1)
BILIRUB SERPL-MCNC: 7.1 MG/DL — HIGH (ref 0.2–1.2)
BUN SERPL-MCNC: 14 MG/DL — SIGNIFICANT CHANGE UP (ref 7–23)
CALCIUM SERPL-MCNC: 9.5 MG/DL — SIGNIFICANT CHANGE UP (ref 8.4–10.5)
CHLORIDE SERPL-SCNC: 97 MMOL/L — SIGNIFICANT CHANGE UP (ref 96–108)
CO2 SERPL-SCNC: 25 MMOL/L — SIGNIFICANT CHANGE UP (ref 22–31)
CREAT SERPL-MCNC: 0.9 MG/DL — SIGNIFICANT CHANGE UP (ref 0.5–1.3)
GLUCOSE BLDC GLUCOMTR-MCNC: 200 MG/DL — HIGH (ref 70–99)
GLUCOSE BLDC GLUCOMTR-MCNC: 219 MG/DL — HIGH (ref 70–99)
GLUCOSE BLDC GLUCOMTR-MCNC: 279 MG/DL — HIGH (ref 70–99)
GLUCOSE SERPL-MCNC: 262 MG/DL — HIGH (ref 70–99)
HCT VFR BLD CALC: 24.6 % — LOW (ref 39–50)
HGB BLD-MCNC: 8.3 G/DL — LOW (ref 13–17)
INR BLD: 2.16 RATIO — HIGH (ref 0.88–1.16)
MCHC RBC-ENTMCNC: 33.7 GM/DL — SIGNIFICANT CHANGE UP (ref 32–36)
MCHC RBC-ENTMCNC: 36.2 PG — HIGH (ref 27–34)
MCV RBC AUTO: 107.4 FL — HIGH (ref 80–100)
NRBC # BLD: 0 /100 WBCS — SIGNIFICANT CHANGE UP (ref 0–0)
PLATELET # BLD AUTO: 90 K/UL — LOW (ref 150–400)
POTASSIUM SERPL-MCNC: 4.3 MMOL/L — SIGNIFICANT CHANGE UP (ref 3.5–5.3)
POTASSIUM SERPL-SCNC: 4.3 MMOL/L — SIGNIFICANT CHANGE UP (ref 3.5–5.3)
PROT SERPL-MCNC: 6 G/DL — SIGNIFICANT CHANGE UP (ref 6–8.3)
PROTHROM AB SERPL-ACNC: 25 SEC — HIGH (ref 10–13.1)
RBC # BLD: 2.29 M/UL — LOW (ref 4.2–5.8)
RBC # FLD: 14.7 % — HIGH (ref 10.3–14.5)
SODIUM SERPL-SCNC: 131 MMOL/L — LOW (ref 135–145)
WBC # BLD: 8.73 K/UL — SIGNIFICANT CHANGE UP (ref 3.8–10.5)
WBC # FLD AUTO: 8.73 K/UL — SIGNIFICANT CHANGE UP (ref 3.8–10.5)

## 2020-03-02 PROCEDURE — 99232 SBSQ HOSP IP/OBS MODERATE 35: CPT | Mod: GC

## 2020-03-02 PROCEDURE — 99233 SBSQ HOSP IP/OBS HIGH 50: CPT

## 2020-03-02 RX ORDER — INSULIN LISPRO 100/ML
10 VIAL (ML) SUBCUTANEOUS
Refills: 0 | Status: DISCONTINUED | OUTPATIENT
Start: 2020-03-02 | End: 2020-03-03

## 2020-03-02 RX ORDER — INSULIN GLARGINE 100 [IU]/ML
14 INJECTION, SOLUTION SUBCUTANEOUS AT BEDTIME
Refills: 0 | Status: DISCONTINUED | OUTPATIENT
Start: 2020-03-02 | End: 2020-03-03

## 2020-03-02 RX ADMIN — TAMSULOSIN HYDROCHLORIDE 0.4 MILLIGRAM(S): 0.4 CAPSULE ORAL at 21:46

## 2020-03-02 RX ADMIN — Medication 20 MILLIGRAM(S): at 05:52

## 2020-03-02 RX ADMIN — INSULIN GLARGINE 14 UNIT(S): 100 INJECTION, SOLUTION SUBCUTANEOUS at 21:46

## 2020-03-02 RX ADMIN — Medication 4: at 13:22

## 2020-03-02 RX ADMIN — MIDODRINE HYDROCHLORIDE 30 MILLIGRAM(S): 2.5 TABLET ORAL at 11:39

## 2020-03-02 RX ADMIN — MIDODRINE HYDROCHLORIDE 30 MILLIGRAM(S): 2.5 TABLET ORAL at 21:45

## 2020-03-02 RX ADMIN — MIDODRINE HYDROCHLORIDE 30 MILLIGRAM(S): 2.5 TABLET ORAL at 05:52

## 2020-03-02 RX ADMIN — PANTOPRAZOLE SODIUM 40 MILLIGRAM(S): 20 TABLET, DELAYED RELEASE ORAL at 05:52

## 2020-03-02 RX ADMIN — Medication 1 MILLIGRAM(S): at 11:39

## 2020-03-02 RX ADMIN — Medication 10 UNIT(S): at 19:05

## 2020-03-02 RX ADMIN — LACTULOSE 20 GRAM(S): 10 SOLUTION ORAL at 21:46

## 2020-03-02 RX ADMIN — Medication 6: at 09:50

## 2020-03-02 RX ADMIN — Medication 6: at 19:06

## 2020-03-02 RX ADMIN — Medication 10 UNIT(S): at 09:52

## 2020-03-02 RX ADMIN — Medication 10 UNIT(S): at 13:22

## 2020-03-02 RX ADMIN — POLYETHYLENE GLYCOL 3350 17 GRAM(S): 17 POWDER, FOR SOLUTION ORAL at 11:39

## 2020-03-02 RX ADMIN — SPIRONOLACTONE 50 MILLIGRAM(S): 25 TABLET, FILM COATED ORAL at 05:52

## 2020-03-02 RX ADMIN — LACTULOSE 20 GRAM(S): 10 SOLUTION ORAL at 05:52

## 2020-03-02 RX ADMIN — SENNA PLUS 2 TABLET(S): 8.6 TABLET ORAL at 21:46

## 2020-03-02 RX ADMIN — LACTULOSE 20 GRAM(S): 10 SOLUTION ORAL at 13:23

## 2020-03-02 RX ADMIN — Medication 50 MILLILITER(S): at 05:50

## 2020-03-02 NOTE — DISCHARGE NOTE PROVIDER - HOSPITAL COURSE
64 M  with DM2, HLD, HFpEF with mild LV diastolic dysfunction, JEAN cirrhosis complicated by ascites, history of SBP, hepatic encephalopathy, and chronic anemia with a history of duodenal ulcer as well as GAVE and duodenal AVM s/p APC (last on 10/11/19), who is UNOS listed for liver transplantation at [facility], . h/o hepatic encephalopathy and h/o SBP.     -Ascites: Trace on CT abdomen/pelvis from 02/11/20    -Varices: Small non-bleeding EV on EGD from 12/2019.    -HCC: No focal liver lesions on MRI abdomen 12/2019.     presents from home with frequent falls, found to be orthostatic on diuretics and Midodrine         but hepatology recommended to c/w Lasix 20mg, aldactone 50mg and increase midodrine 30mg tid     Continue rifaximin 500mg bid, lactulose 20gm tid, c/w midodrine 30mg tid      added senna and Miralax for constipation.    - c/w Albumin infusion 25% 50ml q 6 hrs     - Echo: TTE: 75%, Normal LV function    - Elastic stocking LEGs    -Mental status at baseline on lactulose    - PT recs-[facility], fall precaution.                 Medically cleared for discharge by . 64 M  with DM2, HLD, HFpEF with mild LV diastolic dysfunction, JEAN cirrhosis complicated by ascites, history of SBP, hepatic encephalopathy, and chronic anemia with a history of duodenal ulcer as well as GAVE and duodenal AVM s/p APC (last on 10/11/19), who is UNOS listed for liver transplantation at [facility], . h/o hepatic encephalopathy and h/o SBP.     -Ascites: Trace on CT abdomen/pelvis from 02/11/20    -Varices: Small non-bleeding EV on EGD from 12/2019.    -HCC: No focal liver lesions on MRI abdomen 12/2019.     presents from home with frequent falls, found to be orthostatic on diuretics and Midodrine         but hepatology recommended to c/w Lasix 20mg, aldactone 50mg and increase midodrine 30mg tid     Continue rifaximin 500mg bid, lactulose 20gm tid, c/w midodrine 30mg tid      added senna and Miralax for constipation.    - c/w Albumin infusion 25% 50ml q 6 hrs     - Echo: TTE: 75%, Normal LV function    - Elastic stocking LEGs    -Mental status at baseline on lactulose    - PT recomends home for home PT fall precaution.                 Medically cleared for discharge by .

## 2020-03-02 NOTE — PROGRESS NOTE ADULT - SUBJECTIVE AND OBJECTIVE BOX
Chief Complaint:  Patient is a 64y old  Male who presents with a chief complaint of Falls (02 Mar 2020 15:05)      Interval Events: Pt denies nausea, vomiting, abdominal pain.   ROS: All 12 point system except listed above were otherwise negative.    Allergies:  codeine (Anaphylaxis)        Hospital Medications:  dextrose 40% Gel 15 Gram(s) Oral once PRN  dextrose 5%. 1000 milliLiter(s) IV Continuous <Continuous>  dextrose 50% Injectable 12.5 Gram(s) IV Push once  dextrose 50% Injectable 25 Gram(s) IV Push once  dextrose 50% Injectable 25 Gram(s) IV Push once  folic acid 1 milliGRAM(s) Oral daily  furosemide    Tablet 20 milliGRAM(s) Oral daily  glucagon  Injectable 1 milliGRAM(s) IntraMuscular once PRN  insulin glargine Injectable (LANTUS) 14 Unit(s) SubCutaneous at bedtime  insulin lispro (HumaLOG) corrective regimen sliding scale   SubCutaneous three times a day before meals  insulin lispro (HumaLOG) corrective regimen sliding scale   SubCutaneous at bedtime  insulin lispro Injectable (HumaLOG) 10 Unit(s) SubCutaneous three times a day before meals  lactulose Syrup 20 Gram(s) Oral three times a day  midodrine. 30 milliGRAM(s) Oral three times a day  pantoprazole    Tablet 40 milliGRAM(s) Oral before breakfast  polyethylene glycol 3350 17 Gram(s) Oral daily  rifAXIMin 550 milliGRAM(s) Oral two times a day  senna 2 Tablet(s) Oral at bedtime  spironolactone 50 milliGRAM(s) Oral daily  tamsulosin 0.4 milliGRAM(s) Oral at bedtime      PMHX/PSHX:  GIB (gastrointestinal bleeding)  GERD with esophagitis  Hepatic encephalopathy  Obesity  Fatty liver disease, nonalcoholic  Renal stones  Hypertension  Neuropathy  Hypercholesteremia  Diabetes  S/P cholecystectomy  No significant past surgical history      Family history:  Family history of type 2 diabetes mellitus  Family history of hypertension  Family history of stomach cancer  No pertinent family history in first degree relatives    There is no family history of peptic ulcer disease, gastric cancer, colon polyps, colon cancer, celiac disease, biliary, hepatic, or pancreatic disease.  None of the female relatives have breast, uterine, or ovarian cancer.     ROS:     General:  No weight loss, fevers, chills, night sweats, fatigue  Eyes:  No vision changes, no yellowing of eyes   ENT:  No throat pain, runny nose  CV:  No chest pain, palpitations  Resp:  No SOB, cough, wheezing  GI:  See HPI  :  No burning with urination, no hematuria   Muscle:  No muscle pain, weakness  Neuro:  No numbness/tingling, memory problems  Psych:  No fatigue, insomnia, mood problems  Heme:  No easy bruisability  Skin:  No rash, itching     PHYSICAL EXAM:   Vital Signs:  Vital Signs Last 24 Hrs  T(C): 36.9 (02 Mar 2020 13:30), Max: 37.7 (01 Mar 2020 23:01)  T(F): 98.4 (02 Mar 2020 13:30), Max: 99.8 (01 Mar 2020 23:01)  HR: 90 (02 Mar 2020 13:30) (85 - 103)  BP: 137/71 (02 Mar 2020 13:30) (120/66 - 142/69)  BP(mean): --  RR: 18 (02 Mar 2020 13:30) (18 - 18)  SpO2: 97% (02 Mar 2020 13:30) (94% - 98%)  Daily     Daily     PHYSICAL EXAM:     GENERAL:  Sleeping but arousable, non-toxic, well developed  HEENT:  Conjunctivae clear and pink +L periorbital ecchymoses   CHEST:  Full & symmetric excursion, no increased effort   HEART:  Regular rhythm & rate   ABDOMEN:  Soft, non-tender, non-distended, obese  EXTREMITIES:  no LE edema   SKIN:  No rash/erythema/ecchymoses/petechiae/jaundice   NEURO:  Alert, orientedx3  LABS:                        8.3    8.73  )-----------( 90       ( 02 Mar 2020 06:40 )             24.6     Mean Cell Volume: 107.4 fl (03-02-20 @ 06:40)    03-02    131<L>  |  97  |  14  ----------------------------<  262<H>  4.3   |  25  |  0.90    Ca    9.5      02 Mar 2020 06:37  Phos  2.3     03-01  Mg     1.6     03-01    TPro  6.0  /  Alb  2.9<L>  /  TBili  7.1<H>  /  DBili  1.7<H>  /  AST  26  /  ALT  14  /  AlkPhos  162<H>  03-02    LIVER FUNCTIONS - ( 02 Mar 2020 06:37 )  Alb: 2.9 g/dL / Pro: 6.0 g/dL / ALK PHOS: 162 U/L / ALT: 14 U/L / AST: 26 U/L / GGT: x           PT/INR - ( 02 Mar 2020 07:52 )   PT: 25.0 sec;   INR: 2.16 ratio         PTT - ( 01 Mar 2020 09:07 )  PTT:43.1 sec                            8.3    8.73  )-----------( 90       ( 02 Mar 2020 06:40 )             24.6                         8.1    8.06  )-----------( 85       ( 01 Mar 2020 06:31 )             23.8                         7.5    7.27  )-----------( 73       ( 29 Feb 2020 06:57 )             22.4     Imaging:

## 2020-03-02 NOTE — DISCHARGE NOTE PROVIDER - NSDCCPCAREPLAN_GEN_ALL_CORE_FT
PRINCIPAL DISCHARGE DIAGNOSIS  Diagnosis: Fall  Assessment and Plan of Treatment: hepatology recommended to c/w Lasix 20mg, aldactone 50mg and increase midodrine 30mg tid  - c/w Albumin infusion 25% 50ml q 6 hrs   - Echo: TTE: 75%, Normal LV function  - Elastic stocking LEGs  fall and safety precaution.  - PT recs-SANTANA, fall precaution.         SECONDARY DISCHARGE DIAGNOSES  Diagnosis: Fatty liver disease, nonalcoholic  Assessment and Plan of Treatment: -C/w Lasix 20mg, aldactone 50mg  - Continue rifaximin 500mg bid, lactulose 20gm tid, c/w midodrine 30mg tid    added senna and Miralax for constipation.

## 2020-03-02 NOTE — PROGRESS NOTE ADULT - PROBLEM SELECTOR PLAN 1
Patient has multiple etiologies for falls: positive orthostatic hypotension, weakness/deconditioning from cirrhosis, diabetic neuropathy.  - Yesterday held diuretics ( abdomen is not distended), but hepatology recommended to c/w Lasix 20mg, aldactone 50mg and increase midodrine 30mg tid  - c/w Albumin infusion 25% 50ml q 6 hrs   - Echo: TTE: 75%, Normal LV function  - Elastic stocking LEGs  - PT recs-SANTANA, fall precaution

## 2020-03-02 NOTE — PROGRESS NOTE ADULT - SUBJECTIVE AND OBJECTIVE BOX
Shila Restrepo MD  Division of Hospital Medicine  Pager [phone]  If no response or off hours page: [phone]  ---------------------------------------------------------    JESSICA MARTIN  64y  Male      Patient is a 64y old  Male who presents with a chief complaint of Falls (02 Mar 2020 12:49)      INTERVAL HPI/OVERNIGHT EVENTS:  No acute overnight events. Patient denies any complaints of pain, dizziness, CP, SOB        REVIEW OF SYSTEMS: 14 point ROS negative unless listed above    T(C): 36.9 (03-02-20 @ 13:30), Max: 37.7 (03-01-20 @ 23:01)  HR: 90 (03-02-20 @ 13:30) (85 - 103)  BP: 137/71 (03-02-20 @ 13:30) (120/66 - 142/69)  RR: 18 (03-02-20 @ 13:30) (18 - 18)  SpO2: 97% (03-02-20 @ 13:30) (94% - 98%)  Wt(kg): --Vital Signs Last 24 Hrs  T(C): 36.9 (02 Mar 2020 13:30), Max: 37.7 (01 Mar 2020 23:01)  T(F): 98.4 (02 Mar 2020 13:30), Max: 99.8 (01 Mar 2020 23:01)  HR: 90 (02 Mar 2020 13:30) (85 - 103)  BP: 137/71 (02 Mar 2020 13:30) (120/66 - 142/69)  BP(mean): --  RR: 18 (02 Mar 2020 13:30) (18 - 18)  SpO2: 97% (02 Mar 2020 13:30) (94% - 98%)    PHYSICAL EXAM:  GENERAL: NAD, well-groomed  EYES: conjunctiva and sclera clear  ENMT: Moist mucous membranes  NECK: Supple  CHEST/LUNG: Clear to auscultation bilaterally; No rales, rhonchi, wheezing, or rubs  HEART: Regular rate and rhythm; No murmurs, rubs, or gallops  ABDOMEN: Soft, Nontender, Nondistended  EXTREMITIES: No clubbing, cyanosis, or edema  SKIN: No rashes or lesions  PSYCH: Alert & Oriented x3      Consultant(s) Notes Reviewed:  [x ] YES  [ ] NO  Care Discussed with Consultants/Other Providers [ x] YES-Np Zeta  [ ] NO    LABS:                        8.3    8.73  )-----------( 90       ( 02 Mar 2020 06:40 )             24.6     03-02    131<L>  |  97  |  14  ----------------------------<  262<H>  4.3   |  25  |  0.90    Ca    9.5      02 Mar 2020 06:37  Phos  2.3     03-01  Mg     1.6     03-01    TPro  6.0  /  Alb  2.9<L>  /  TBili  7.1<H>  /  DBili  1.7<H>  /  AST  26  /  ALT  14  /  AlkPhos  162<H>  03-02    PT/INR - ( 02 Mar 2020 07:52 )   PT: 25.0 sec;   INR: 2.16 ratio         PTT - ( 01 Mar 2020 09:07 )  PTT:43.1 sec    CAPILLARY BLOOD GLUCOSE      POCT Blood Glucose.: 219 mg/dL (02 Mar 2020 13:21)  POCT Blood Glucose.: 265 mg/dL (02 Mar 2020 09:39)  POCT Blood Glucose.: 264 mg/dL (01 Mar 2020 21:23)  POCT Blood Glucose.: 284 mg/dL (01 Mar 2020 18:01)            RADIOLOGY & ADDITIONAL TESTS:    Imaging Personally Reviewed:  [ ] YES  [ ] NO

## 2020-03-02 NOTE — PROGRESS NOTE ADULT - PROBLEM SELECTOR PLAN 2
Afebrile, no signs of decompensation from cirrhosis. Abdomen is not distended:  - resumed low dose diuretics per Hepatology- Lasix 20mg, aldactone 50mg  - Continue rifaximin 500mg bid, lactulose 20gm tid, c/w midodrine 30mg tid    added senna and Miralax for constipation

## 2020-03-02 NOTE — DISCHARGE NOTE PROVIDER - NSDCMRMEDTOKEN_GEN_ALL_CORE_FT
folic acid 1 mg oral tablet: 1 tab(s) orally once a day  furosemide 20 mg oral tablet: 1 tab(s) orally every other day starting 2/23  every kasey tuesday and thrusday    furosemide 40 mg oral tablet: 1 tab(s) orally every other day   starting 2/22 saturday monday/wednesday/friday/saturday   insulin glargine: 12 unit(s) subcutaneous once a day (at bedtime)  insulin lispro: 6 unit(s) subcutaneous 3 times a day (before meals)  lactulose 10 g/15 mL oral syrup: 30 milliliter(s) orally 3 times a day  midodrine 10 mg oral tablet: 2 tab(s) orally 3 times a day  Multiple Vitamins oral tablet: 1 tab(s) orally once a day  pantoprazole 40 mg oral delayed release tablet: 1 tab(s) orally 2 times a day   rifAXIMin 550 mg oral tablet: 1 tab(s) orally 2 times a day  spironolactone 25 mg oral tablet: 2 tab(s) orally once a day  tamsulosin 0.4 mg oral capsule: 1 cap(s) orally once a day (at bedtime) folic acid 1 mg oral tablet: 1 tab(s) orally once a day  furosemide 20 mg oral tablet: 1 tab(s) orally once a day  insulin glargine: 12 unit(s) subcutaneous once a day (at bedtime)  insulin lispro: 6 unit(s) subcutaneous 3 times a day (before meals)  lactulose 10 g/15 mL oral syrup: 30 milliliter(s) orally 3 times a day  midodrine 10 mg oral tablet: 3 tab(s) orally 3 times a day  pantoprazole 40 mg oral delayed release tablet: 1 tab(s) orally once a day (before a meal)  rifAXIMin 550 mg oral tablet: 1 tab(s) orally 2 times a day  spironolactone 25 mg oral tablet: 2 tab(s) orally once a day  tamsulosin 0.4 mg oral capsule: 1 cap(s) orally once a day (at bedtime)

## 2020-03-02 NOTE — DISCHARGE NOTE PROVIDER - CARE PROVIDER_API CALL
Letitia Mo)  Gastroenterology; Internal Medicine  [address] [place] [address]  Phone: [phone]  Fax: [phone]  Follow Up Time:

## 2020-03-02 NOTE — PROGRESS NOTE ADULT - ASSESSMENT
64 M  with DM2, HLD, HFpEF with mild LV diastolic dysfunction, JEAN cirrhosis complicated by ascites, history of SBP, hepatic encephalopathy, and chronic anemia with a history of duodenal ulcer as well as GAVE and duodenal AVM s/p APC (last on 10/11/19), who is UNOS listed for liver transplantation at [facility], presents from home with frequent falls, found to be orthostatic on diuretics and Midodrine.

## 2020-03-02 NOTE — CONSULT NOTE ADULT - ASSESSMENT
64M well known to our practice with PMHx of JEAN cirrhosis (decompensated by h/o SBP, ascites, esophageal varices and PSE) UNOS listed for liver transplantation, h/o PUD (duodenal ulcer), GAVE, chronic anemia and DM2 who presents from home for frequent falls secondary to orthostatic hypotension from over diuresis. GI consulted     Frequent Falls    - secondary to orthostatic hypotension from over diuresis  - diuretic dosing adjusting. Currently receiving Lasix 20mg daily and Aldactone 50mg daily. Midodrine increased to 30mg TID   - albumin 25% q8h as per hepatology recommendations  - CT head negative for acute intracranial pathology or hemorrhage  - CT chest negative, troponin wnl   - TTE unremarkable     JEAN cirrhosis    - being followed by hepatology team. UNOS listed for liver transplant  - MELD-Na score of 24 on 02/29/20. Today MELD-Na score is 26 (03/02/20)  - no current signs of HE or SBP; patient is mentating at baseline on lactulose 20g TID and Xifaxan 550mg BID.   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 Care appreciated     PUD     - history of GAVE and duodenal ulcers  - cont PPI daily   - GERD precautions     Advanced care planning was discussed with patient and family.  Advanced care planning forms were reviewed and discussed.  Risks, benefits and alternatives of gastroenterologic procedures were discussed in detail and all questions were answered.    30 minutes spent. 64M well known to our practice with PMHx of JEAN cirrhosis (decompensated by h/o SBP, ascites, esophageal varices and PSE) UNOS listed for liver transplantation, h/o PUD (duodenal ulcer), GAVE, chronic anemia and DM2 who presents from home for frequent falls secondary to orthostatic hypotension from over diuresis. GI consulted     Frequent Falls    - secondary to orthostatic hypotension from over diuresis  - diuretic dosing adjusting. Currently receiving Lasix 20mg daily and Aldactone 50mg daily. Midodrine increased to 30mg TID   - CT head negative for acute intracranial pathology or hemorrhage  - CT chest negative, troponin wnl   - TTE unremarkable     JEAN cirrhosis    - being followed by hepatology team. UNOS listed for liver transplant  - MELD-Na score of 24 on 02/29/20. Today MELD-Na score is 26 (03/02/20)  - no current signs of HE or SBP; patient is mentating at baseline on lactulose 20g TID and Xifaxan 550mg BID.   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 Care appreciated     PUD     - history of GAVE and duodenal ulcers  - cont PPI daily   - GERD precautions     Advanced care planning was discussed with patient and family.  Advanced care planning forms were reviewed and discussed.  Risks, benefits and alternatives of gastroenterologic procedures were discussed in detail and all questions were answered.    30 minutes spent.

## 2020-03-02 NOTE — PROGRESS NOTE ADULT - ASSESSMENT
Impression:    #JEAN Cirrhosis: UNOS listed for liver transplant. MELD-Na 26 03/02/2020  Decompensated by esophageal varices, ascites, h/o hepatic encephalopathy and h/o SBP.   -Ascites: Trace on CT abdomen/pelvis from 02/11/20  -Varices: Small non-bleeding EV on EGD from 12/2019.  -HCC: No focal liver lesions on MRI abdomen 12/2019.   -PSE: No asterixis.     #Mechanical falls: Differential includes deconditioning vs. diabetic neuropathy; positive orthostatic vital signs this admission.  -TTE unremarkable this admission, less likely cardiac cause. CT head without acute neurologic findings.   -Mental status at baseline on lactulose    Recommendations:  - Continue with Lasix 20 mg daily and spironolactone 50 mg daily  - Continue with midodrine to 30 mg daily TID  - DC planning Impression:    #JEAN Cirrhosis: UNOS listed for liver transplant. MELD-Na 26 03/02/2020  Decompensated by esophageal varices, ascites, h/o hepatic encephalopathy and h/o SBP.   -Ascites: Trace on CT abdomen/pelvis from 02/11/20  -Varices: Small non-bleeding EV on EGD from 12/2019.  -HCC: No focal liver lesions on MRI abdomen 12/2019.   -PSE: No asterixis.     #Mechanical falls: Differential includes deconditioning vs. diabetic neuropathy; positive orthostatic vital signs this admission.  -TTE unremarkable this admission, less likely cardiac cause. CT head without acute neurologic findings.   -Mental status at baseline on lactulose    Recommendations:  - Continue with Lasix 20 mg daily and spironolactone 50 mg daily  - Continue with midodrine to 30 mg TID  - DC planning

## 2020-03-02 NOTE — CONSULT NOTE ADULT - SUBJECTIVE AND OBJECTIVE BOX
Chief Complaint:  Patient is a 64y old  Male who presents with a chief complaint of Falls (02 Mar 2020 12:49)      HPI: 64M well known to our practice with PMHx of JEAN cirrhosis (decompensated by h/o SBP, ascites, esophageal varices and PSE) UNOS listed for liver transplantation, h/o PUD (duodenal ulcer), GAVE, chronic anemia and DM2 who presents from home for frequent falls. Of note, patient had recent admission 2/11/20-2/21/20 for septic shock secondary to cystitis and SBP. Since his discharge, he reports daily falls without prodromal symptoms (dizziness, nausea, weakness) or loss of consciousness but admits to hitting his head. He describes weakness and his legs giving out beneath him suddenly. He denies confusion, leg swelling, abdominal swelling, fevers, chills, diarrhea, nausea, vomiting or sick contacts. He was found to have orthostatic vital signs and his diuretics have been re-adjusted    Of note, patient was last seen by our practice  in late December for anemia. He underwent upper gastrointestinal endoscopy with advanced GI team 12/30/19 which demonstrated small non-bleeding esophageal varices in the lower third of the esophagus that were not amendable to banding, portal hypertensive gastropathy, gastric antral vascular ectasia (GAVE)  treated with argon plasma coagulation (APC). Upon evaluation, patient is resting comfortably in bed in no apparent distress. Tolerating regular diet. He denies fever, chills, confusion, dizziness, nausea, vomiting, abdominal pain, constipation, diarrhea, hematemesis, melena, brbpr, hematochezia     Allergies:  codeine (Anaphylaxis)      Medications:  dextrose 40% Gel 15 Gram(s) Oral once PRN  dextrose 5%. 1000 milliLiter(s) IV Continuous <Continuous>  dextrose 50% Injectable 12.5 Gram(s) IV Push once  dextrose 50% Injectable 25 Gram(s) IV Push once  dextrose 50% Injectable 25 Gram(s) IV Push once  folic acid 1 milliGRAM(s) Oral daily  furosemide    Tablet 20 milliGRAM(s) Oral daily  glucagon  Injectable 1 milliGRAM(s) IntraMuscular once PRN  insulin glargine Injectable (LANTUS) 14 Unit(s) SubCutaneous at bedtime  insulin lispro (HumaLOG) corrective regimen sliding scale   SubCutaneous three times a day before meals  insulin lispro (HumaLOG) corrective regimen sliding scale   SubCutaneous at bedtime  insulin lispro Injectable (HumaLOG) 10 Unit(s) SubCutaneous three times a day before meals  lactulose Syrup 20 Gram(s) Oral three times a day  midodrine. 30 milliGRAM(s) Oral three times a day  pantoprazole    Tablet 40 milliGRAM(s) Oral before breakfast  polyethylene glycol 3350 17 Gram(s) Oral daily  rifAXIMin 550 milliGRAM(s) Oral two times a day  senna 2 Tablet(s) Oral at bedtime  spironolactone 50 milliGRAM(s) Oral daily  tamsulosin 0.4 milliGRAM(s) Oral at bedtime      PMHX/PSHX:  GIB (gastrointestinal bleeding)  GERD with esophagitis  Hepatic encephalopathy  Obesity  Fatty liver disease, nonalcoholic  Renal stones  Hypertension  Neuropathy  Hypercholesteremia  Diabetes  S/P cholecystectomy  No significant past surgical history      Family history:  Family history of type 2 diabetes mellitus  Family history of hypertension  Family history of stomach cancer  No pertinent family history in first degree relatives      Social History:     ROS:     General:  No wt loss, fevers, chills, night sweats, fatigue,   Eyes:  Good vision, no reported pain  ENT:  No sore throat, pain, runny nose, dysphagia  CV:  No pain, palpitations, hypo/hypertension  Resp:  No dyspnea, cough, tachypnea, wheezing  GI:  No pain, No nausea, No vomiting, No diarrhea, No constipation, No weight loss, No fever, No pruritis, No rectal bleeding, No tarry stools, No dysphagia,  :  No pain, bleeding, incontinence, nocturia  Muscle:  No pain, weakness  Neuro:  No weakness, tingling, memory problems  Psych:  No fatigue, insomnia, mood problems, depression  Endocrine:  No polyuria, polydipsia, cold/heat intolerance  Heme:  No petechiae, ecchymosis, easy bruisability  Skin:  No rash, tattoos, scars, edema      PHYSICAL EXAM:   Vital Signs:  Vital Signs Last 24 Hrs  T(C): 36.9 (02 Mar 2020 13:30), Max: 37.7 (01 Mar 2020 23:01)  T(F): 98.4 (02 Mar 2020 13:30), Max: 99.8 (01 Mar 2020 23:01)  HR: 90 (02 Mar 2020 13:30) (85 - 103)  BP: 137/71 (02 Mar 2020 13:30) (120/66 - 142/69)  BP(mean): --  RR: 18 (02 Mar 2020 13:30) (18 - 18)  SpO2: 97% (02 Mar 2020 13:30) (94% - 98%)  Daily     Daily     GENERAL:  Appears stated age, no distress  HEENT:  NC/AT, no scleral icterus, no JVD  CHEST:  Full & symmetric excursion, no increased effort, breath sounds clear  HEART:  Regular rhythm, S1, S2  ABDOMEN:  obese,  non-tender, non-distended, normoactive bowel sounds  EXTEREMITIES:  no cyanosis,clubbing or edema  SKIN:  No rash/erythema/ecchymoses/petechiae/wounds/abscess/warm/dry  NEURO:  Alert, oriented, no asterixis, no tremor, no encephalopathy    LABS:                        8.3    8.73  )-----------( 90       ( 02 Mar 2020 06:40 )             24.6     03-02    131<L>  |  97  |  14  ----------------------------<  262<H>  4.3   |  25  |  0.90    Ca    9.5      02 Mar 2020 06:37  Phos  2.3     03-01  Mg     1.6     03-01    TPro  6.0  /  Alb  2.9<L>  /  TBili  7.1<H>  /  DBili  1.7<H>  /  AST  26  /  ALT  14  /  AlkPhos  162<H>  03-02    LIVER FUNCTIONS - ( 02 Mar 2020 06:37 )  Alb: 2.9 g/dL / Pro: 6.0 g/dL / ALK PHOS: 162 U/L / ALT: 14 U/L / AST: 26 U/L / GGT: x           PT/INR - ( 02 Mar 2020 07:52 )   PT: 25.0 sec;   INR: 2.16 ratio         PTT - ( 01 Mar 2020 09:07 )  PTT:43.1 sec        Imaging:  < from: Upper Endoscopy (12.30.19 @ 08:09) >    [facility]  ____________________________________________________________________________________________________  Patient Name: Prashant Segundo                       MRN: [id number]  Account Number: [id number]                     YOB: 1955  Room: Endoscopy Room 4                           Gender: Male  Attending MD: Nestor Schroeder MD          Procedure Date No Time: 12/30/2019  ____________________________________________________________________________________________________     Procedure:           Upper GI endoscopy  Indications:         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 Estimated blood loss: Minimal.  ____________________________________________________________________________________________________  Procedure:         Pre-Anesthesia Assessment:                       - Universal Protocol:                       - Pre-procedure Verification: Prior to the procedure, the patient's identity                        was verified by full name, date of birth and medical record number. The                        patient's identity was verified on all pertinent medical records, including                        History and Physical, nursing assessment and pre-anesthesia assessment. Also                        priorto the procedure, a History and Physical was performed, and patient                        medications, allergies and sensitivities were reviewed. The patient's                        tolerance of previous anesthesia was reviewed. The risks and benefits of the                        procedure and the sedation options and risks were discussed with the patient.                        All questions were answered and informed consent was obtained.                       - Marking: The endoscopic procedure was visually marked on a patient wrist                        band delineating the patient name, proposed procedure and endoscopist's                        initials.                       - Time-Out: Prior to the start of the procedure, the patient's                        identification, proposed procedure, accurate signed consent, correctly                        labeled images and records, and need for prophylactic antibiotics were                        verified by the physician, the nurse, the anesthesiologist and the                        anesthetist in the pre-procedure area in the procedure room.                       After obtaining informed consent, the endoscope was passed under direct                        vision. Throughout the procedure, the patient's blood pressure, pulse, and                        oxygen saturations were monitored continuously. The Endoscope was introduced                        through the mouth, and advanced to the second part of duodenum. The upper GI                        endoscopy was accomplished without difficulty. The patient tolerated the                        procedure well.                                                                                                        Findings:       One column of non-bleeding small (< 5 mm) varices that completely flatten with insufflation        were found in the lower third of the esophagus,. No stigmata of recent bleeding were evident        and no red beatriz signs were present. It is not amendable to band ligation.       The exam of the esophagus was otherwise normal.       Portal hypertensive gastropathy was found in the gastric fundus and in the gastric body.       Mild gastric antral vascular ectasia without bleeding was present in the gastric antrum.        Coagulation for bleeding prevention using argon plasma was successful.       The exam of the stomach was otherwise normal.       The examined duodenum was normal.                                                Impression:          - Non-bleeding small (< 5 mm) esophageal varices, not amendable to banding.                       - Portal hypertensive gastropathy.                       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 Treated with argon plasma                        coagulation (APC).                       - Normal examined duodenum.                       - No specimens collected.  Recommendation:      - [facility].                       - Resume regular diet.                       - Follow up with transplant hepatology                                                                                                        Attending Participation:   I was present and participated during the entire procedure, including non-key portions.                                                                                                          ________________________  Nestor Schroeder MD  12/30/2019 2:58:53 PM  Number of Addenda: 0    Note Initiated On: 12/30/2019 8:09 AM    < end of copied text >

## 2020-03-03 ENCOUNTER — TRANSCRIPTION ENCOUNTER (OUTPATIENT)
Age: 65
End: 2020-03-03

## 2020-03-03 VITALS
RESPIRATION RATE: 18 BRPM | HEART RATE: 92 BPM | SYSTOLIC BLOOD PRESSURE: 134 MMHG | TEMPERATURE: 98 F | OXYGEN SATURATION: 94 % | DIASTOLIC BLOOD PRESSURE: 67 MMHG

## 2020-03-03 LAB
ALBUMIN SERPL ELPH-MCNC: 2.8 G/DL — LOW (ref 3.3–5)
ALP SERPL-CCNC: 133 U/L — HIGH (ref 40–120)
ALT FLD-CCNC: 14 U/L — SIGNIFICANT CHANGE UP (ref 10–45)
ANION GAP SERPL CALC-SCNC: 10 MMOL/L — SIGNIFICANT CHANGE UP (ref 5–17)
APPEARANCE UR: ABNORMAL
AST SERPL-CCNC: 24 U/L — SIGNIFICANT CHANGE UP (ref 10–40)
BACTERIA # UR AUTO: ABNORMAL
BILIRUB SERPL-MCNC: 8.4 MG/DL — HIGH (ref 0.2–1.2)
BILIRUB UR-MCNC: NEGATIVE — SIGNIFICANT CHANGE UP
BUN SERPL-MCNC: 14 MG/DL — SIGNIFICANT CHANGE UP (ref 7–23)
CALCIUM SERPL-MCNC: 9.8 MG/DL — SIGNIFICANT CHANGE UP (ref 8.4–10.5)
CHLORIDE SERPL-SCNC: 96 MMOL/L — SIGNIFICANT CHANGE UP (ref 96–108)
CO2 SERPL-SCNC: 24 MMOL/L — SIGNIFICANT CHANGE UP (ref 22–31)
COLOR SPEC: YELLOW — SIGNIFICANT CHANGE UP
CREAT SERPL-MCNC: 1.02 MG/DL — SIGNIFICANT CHANGE UP (ref 0.5–1.3)
DIFF PNL FLD: ABNORMAL
EPI CELLS # UR: 0 /HPF — SIGNIFICANT CHANGE UP
GLUCOSE BLDC GLUCOMTR-MCNC: 193 MG/DL — HIGH (ref 70–99)
GLUCOSE BLDC GLUCOMTR-MCNC: 195 MG/DL — HIGH (ref 70–99)
GLUCOSE SERPL-MCNC: 193 MG/DL — HIGH (ref 70–99)
GLUCOSE UR QL: NEGATIVE — SIGNIFICANT CHANGE UP
HCT VFR BLD CALC: 24.6 % — LOW (ref 39–50)
HCT VFR BLD CALC: 24.8 % — LOW (ref 39–50)
HGB BLD-MCNC: 8.2 G/DL — LOW (ref 13–17)
HGB BLD-MCNC: 8.6 G/DL — LOW (ref 13–17)
INR BLD: 2.32 RATIO — HIGH (ref 0.88–1.16)
KETONES UR-MCNC: NEGATIVE — SIGNIFICANT CHANGE UP
LEUKOCYTE ESTERASE UR-ACNC: ABNORMAL
MCHC RBC-ENTMCNC: 33.3 GM/DL — SIGNIFICANT CHANGE UP (ref 32–36)
MCHC RBC-ENTMCNC: 34.7 GM/DL — SIGNIFICANT CHANGE UP (ref 32–36)
MCHC RBC-ENTMCNC: 35.7 PG — HIGH (ref 27–34)
MCHC RBC-ENTMCNC: 36.9 PG — HIGH (ref 27–34)
MCV RBC AUTO: 106.4 FL — HIGH (ref 80–100)
MCV RBC AUTO: 107 FL — HIGH (ref 80–100)
NITRITE UR-MCNC: NEGATIVE — SIGNIFICANT CHANGE UP
NRBC # BLD: 0 /100 WBCS — SIGNIFICANT CHANGE UP (ref 0–0)
PH UR: 6.5 — SIGNIFICANT CHANGE UP (ref 5–8)
PLATELET # BLD AUTO: 88 K/UL — LOW (ref 150–400)
PLATELET # BLD AUTO: 92 K/UL — LOW (ref 150–400)
POTASSIUM SERPL-MCNC: 4.5 MMOL/L — SIGNIFICANT CHANGE UP (ref 3.5–5.3)
POTASSIUM SERPL-SCNC: 4.5 MMOL/L — SIGNIFICANT CHANGE UP (ref 3.5–5.3)
PROT SERPL-MCNC: 6 G/DL — SIGNIFICANT CHANGE UP (ref 6–8.3)
PROT UR-MCNC: ABNORMAL
PROTHROM AB SERPL-ACNC: 26.9 SEC — HIGH (ref 10–13.1)
RBC # BLD: 2.3 M/UL — LOW (ref 4.2–5.8)
RBC # BLD: 2.33 M/UL — LOW (ref 4.2–5.8)
RBC # FLD: 15 % — HIGH (ref 10.3–14.5)
RBC # FLD: 15.1 % — HIGH (ref 10.3–14.5)
RBC CASTS # UR COMP ASSIST: 8 /HPF — HIGH (ref 0–4)
SODIUM SERPL-SCNC: 130 MMOL/L — LOW (ref 135–145)
SP GR SPEC: 1.01 — SIGNIFICANT CHANGE UP (ref 1.01–1.02)
UROBILINOGEN FLD QL: ABNORMAL
WBC # BLD: 11.81 K/UL — HIGH (ref 3.8–10.5)
WBC # BLD: 12.32 K/UL — HIGH (ref 3.8–10.5)
WBC # FLD AUTO: 11.81 K/UL — HIGH (ref 3.8–10.5)
WBC # FLD AUTO: 12.32 K/UL — HIGH (ref 3.8–10.5)
WBC UR QL: 2 /HPF — SIGNIFICANT CHANGE UP (ref 0–5)

## 2020-03-03 PROCEDURE — P9047: CPT

## 2020-03-03 PROCEDURE — 97530 THERAPEUTIC ACTIVITIES: CPT

## 2020-03-03 PROCEDURE — 82803 BLOOD GASES ANY COMBINATION: CPT

## 2020-03-03 PROCEDURE — 83735 ASSAY OF MAGNESIUM: CPT

## 2020-03-03 PROCEDURE — 82947 ASSAY GLUCOSE BLOOD QUANT: CPT

## 2020-03-03 PROCEDURE — 84132 ASSAY OF SERUM POTASSIUM: CPT

## 2020-03-03 PROCEDURE — 82607 VITAMIN B-12: CPT

## 2020-03-03 PROCEDURE — 85610 PROTHROMBIN TIME: CPT

## 2020-03-03 PROCEDURE — 97162 PT EVAL MOD COMPLEX 30 MIN: CPT

## 2020-03-03 PROCEDURE — 72170 X-RAY EXAM OF PELVIS: CPT

## 2020-03-03 PROCEDURE — 84484 ASSAY OF TROPONIN QUANT: CPT

## 2020-03-03 PROCEDURE — 82140 ASSAY OF AMMONIA: CPT

## 2020-03-03 PROCEDURE — 82330 ASSAY OF CALCIUM: CPT

## 2020-03-03 PROCEDURE — 87631 RESP VIRUS 3-5 TARGETS: CPT

## 2020-03-03 PROCEDURE — P9045: CPT

## 2020-03-03 PROCEDURE — 85730 THROMBOPLASTIN TIME PARTIAL: CPT

## 2020-03-03 PROCEDURE — 84295 ASSAY OF SERUM SODIUM: CPT

## 2020-03-03 PROCEDURE — 97110 THERAPEUTIC EXERCISES: CPT

## 2020-03-03 PROCEDURE — C8929: CPT

## 2020-03-03 PROCEDURE — 80048 BASIC METABOLIC PNL TOTAL CA: CPT

## 2020-03-03 PROCEDURE — 99232 SBSQ HOSP IP/OBS MODERATE 35: CPT | Mod: GC

## 2020-03-03 PROCEDURE — 80053 COMPREHEN METABOLIC PANEL: CPT

## 2020-03-03 PROCEDURE — 80076 HEPATIC FUNCTION PANEL: CPT

## 2020-03-03 PROCEDURE — 99239 HOSP IP/OBS DSCHRG MGMT >30: CPT

## 2020-03-03 PROCEDURE — 82248 BILIRUBIN DIRECT: CPT

## 2020-03-03 PROCEDURE — 82962 GLUCOSE BLOOD TEST: CPT

## 2020-03-03 PROCEDURE — 96374 THER/PROPH/DIAG INJ IV PUSH: CPT

## 2020-03-03 PROCEDURE — 71250 CT THORAX DX C-: CPT

## 2020-03-03 PROCEDURE — 83605 ASSAY OF LACTIC ACID: CPT

## 2020-03-03 PROCEDURE — 81001 URINALYSIS AUTO W/SCOPE: CPT

## 2020-03-03 PROCEDURE — 85014 HEMATOCRIT: CPT

## 2020-03-03 PROCEDURE — 85027 COMPLETE CBC AUTOMATED: CPT

## 2020-03-03 PROCEDURE — 82435 ASSAY OF BLOOD CHLORIDE: CPT

## 2020-03-03 PROCEDURE — 70450 CT HEAD/BRAIN W/O DYE: CPT

## 2020-03-03 PROCEDURE — 93005 ELECTROCARDIOGRAM TRACING: CPT

## 2020-03-03 PROCEDURE — 84100 ASSAY OF PHOSPHORUS: CPT

## 2020-03-03 PROCEDURE — 99285 EMERGENCY DEPT VISIT HI MDM: CPT | Mod: 25

## 2020-03-03 RX ORDER — LIDOCAINE 4 G/100G
1 CREAM TOPICAL DAILY
Refills: 0 | Status: DISCONTINUED | OUTPATIENT
Start: 2020-03-03 | End: 2020-03-03

## 2020-03-03 RX ORDER — TAMSULOSIN HYDROCHLORIDE 0.4 MG/1
1 CAPSULE ORAL
Qty: 0 | Refills: 0 | DISCHARGE
Start: 2020-03-03

## 2020-03-03 RX ORDER — TAMSULOSIN HYDROCHLORIDE 0.4 MG/1
1 CAPSULE ORAL
Qty: 0 | Refills: 0 | DISCHARGE

## 2020-03-03 RX ADMIN — Medication 10 UNIT(S): at 14:45

## 2020-03-03 RX ADMIN — Medication 10 UNIT(S): at 08:47

## 2020-03-03 RX ADMIN — Medication 1 MILLIGRAM(S): at 13:41

## 2020-03-03 RX ADMIN — SPIRONOLACTONE 50 MILLIGRAM(S): 25 TABLET, FILM COATED ORAL at 05:19

## 2020-03-03 RX ADMIN — MIDODRINE HYDROCHLORIDE 30 MILLIGRAM(S): 2.5 TABLET ORAL at 05:19

## 2020-03-03 RX ADMIN — MIDODRINE HYDROCHLORIDE 30 MILLIGRAM(S): 2.5 TABLET ORAL at 18:26

## 2020-03-03 RX ADMIN — Medication 2: at 08:48

## 2020-03-03 RX ADMIN — POLYETHYLENE GLYCOL 3350 17 GRAM(S): 17 POWDER, FOR SOLUTION ORAL at 13:41

## 2020-03-03 RX ADMIN — Medication 2: at 14:45

## 2020-03-03 RX ADMIN — PANTOPRAZOLE SODIUM 40 MILLIGRAM(S): 20 TABLET, DELAYED RELEASE ORAL at 05:19

## 2020-03-03 RX ADMIN — MIDODRINE HYDROCHLORIDE 30 MILLIGRAM(S): 2.5 TABLET ORAL at 13:41

## 2020-03-03 RX ADMIN — LACTULOSE 20 GRAM(S): 10 SOLUTION ORAL at 05:18

## 2020-03-03 RX ADMIN — Medication 30 MILLILITER(S): at 13:44

## 2020-03-03 RX ADMIN — Medication 20 MILLIGRAM(S): at 05:18

## 2020-03-03 NOTE — PROGRESS NOTE ADULT - SUBJECTIVE AND OBJECTIVE BOX
INTERVAL HPI/OVERNIGHT EVENTS:    patient seen and examined  feeling better today  tolerating PO  denies fever, chills, n/v abd pain   labs noted, f/u UA     MEDICATIONS  (STANDING):  dextrose 5%. 1000 milliLiter(s) (50 mL/Hr) IV Continuous <Continuous>  dextrose 50% Injectable 12.5 Gram(s) IV Push once  dextrose 50% Injectable 25 Gram(s) IV Push once  dextrose 50% Injectable 25 Gram(s) IV Push once  folic acid 1 milliGRAM(s) Oral daily  furosemide    Tablet 20 milliGRAM(s) Oral daily  insulin glargine Injectable (LANTUS) 14 Unit(s) SubCutaneous at bedtime  insulin lispro (HumaLOG) corrective regimen sliding scale   SubCutaneous three times a day before meals  insulin lispro (HumaLOG) corrective regimen sliding scale   SubCutaneous at bedtime  insulin lispro Injectable (HumaLOG) 10 Unit(s) SubCutaneous three times a day before meals  lactulose Syrup 20 Gram(s) Oral three times a day  midodrine. 30 milliGRAM(s) Oral three times a day  pantoprazole    Tablet 40 milliGRAM(s) Oral before breakfast  polyethylene glycol 3350 17 Gram(s) Oral daily  rifAXIMin 550 milliGRAM(s) Oral two times a day  senna 2 Tablet(s) Oral at bedtime  spironolactone 50 milliGRAM(s) Oral daily  tamsulosin 0.4 milliGRAM(s) Oral at bedtime    MEDICATIONS  (PRN):  aluminum hydroxide/magnesium hydroxide/simethicone Suspension 30 milliLiter(s) Oral every 6 hours PRN Dyspepsia  dextrose 40% Gel 15 Gram(s) Oral once PRN Blood Glucose LESS THAN 70 milliGRAM(s)/deciliter  glucagon  Injectable 1 milliGRAM(s) IntraMuscular once PRN Glucose LESS THAN 70 milligrams/deciliter      Allergies    codeine (Anaphylaxis)    Intolerances        Review of Systems:    General:  No wt loss, fevers, chills, night sweats,fatigue,   Eyes:  Good vision, no reported pain  ENT:  No sore throat, pain, runny nose, dysphagia  CV:  No pain, palpitatioins, hypo/hypertension  Resp:  No dyspnea, cough, tachypnea, wheezing  GI:  No pain, No nausea, No vomiting, No diarrhea, No constipatiion, No weight loss, No fever, No pruritis, No rectal bleeding, No tarry stools, No dysphagia,  :  No pain, bleeding, incontinence, nocturia  Muscle:  No pain, weakness  Neuro:  No weakness, tingling, memory problems  Psych:  No fatigue, insomnia, mood problems, depression  Endocrine:  No polyuria, polydypsia, cold/heat intolerance  Heme:  No petechiae, ecchymosis, easy bruisability  Skin:  No rash, tattoos, scars, edema      Vital Signs Last 24 Hrs  T(C): 37.3 (03 Mar 2020 12:42), Max: 37.4 (03 Mar 2020 05:00)  T(F): 99.1 (03 Mar 2020 12:42), Max: 99.3 (03 Mar 2020 05:00)  HR: 88 (03 Mar 2020 12:42) (87 - 93)  BP: 127/65 (03 Mar 2020 12:42) (120/64 - 144/73)  BP(mean): --  RR: 18 (03 Mar 2020 12:42) (18 - 18)  SpO2: 93% (03 Mar 2020 12:42) (93% - 98%)    PHYSICAL EXAM:    Constitutional: NAD  HEENT: EOMI, throat clear  Neck: No LAD, supple  Respiratory: CTA and P  Cardiovascular: S1 and S2, RRR,  Gastrointestinal: BS+, obese, soft, NT/ND  Extremities: No peripheral edema  Vascular: 2+ peripheral pulses  Neurological: A/O x 3, no focal deficits      LABS:                        8.6    11.81 )-----------( 88       ( 03 Mar 2020 10:36 )             24.8     03-03    130<L>  |  96  |  14  ----------------------------<  193<H>  4.5   |  24  |  1.02    Ca    9.8      03 Mar 2020 06:12    TPro  6.0  /  Alb  2.8<L>  /  TBili  8.4<H>  /  DBili  x   /  AST  24  /  ALT  14  /  AlkPhos  133<H>  03-03    PT/INR - ( 03 Mar 2020 07:40 )   PT: 26.9 sec;   INR: 2.32 ratio               RADIOLOGY & ADDITIONAL TESTS:

## 2020-03-03 NOTE — PROGRESS NOTE ADULT - SUBJECTIVE AND OBJECTIVE BOX
Chief Complaint:  Patient is a 64y old  Male who presents with a chief complaint of Falls (02 Mar 2020 15:41)      Interval Events: Pt denies nausea vomiting abdpain. Trying to ambulate more today.   ROS: All 12 point system except listed above were otherwise negative.    Allergies:  codeine (Anaphylaxis)        Hospital Medications:  dextrose 40% Gel 15 Gram(s) Oral once PRN  dextrose 5%. 1000 milliLiter(s) IV Continuous <Continuous>  dextrose 50% Injectable 12.5 Gram(s) IV Push once  dextrose 50% Injectable 25 Gram(s) IV Push once  dextrose 50% Injectable 25 Gram(s) IV Push once  folic acid 1 milliGRAM(s) Oral daily  furosemide    Tablet 20 milliGRAM(s) Oral daily  glucagon  Injectable 1 milliGRAM(s) IntraMuscular once PRN  insulin glargine Injectable (LANTUS) 14 Unit(s) SubCutaneous at bedtime  insulin lispro (HumaLOG) corrective regimen sliding scale   SubCutaneous three times a day before meals  insulin lispro (HumaLOG) corrective regimen sliding scale   SubCutaneous at bedtime  insulin lispro Injectable (HumaLOG) 10 Unit(s) SubCutaneous three times a day before meals  lactulose Syrup 20 Gram(s) Oral three times a day  lidocaine   Patch 1 Patch Transdermal daily  midodrine. 30 milliGRAM(s) Oral three times a day  pantoprazole    Tablet 40 milliGRAM(s) Oral before breakfast  polyethylene glycol 3350 17 Gram(s) Oral daily  rifAXIMin 550 milliGRAM(s) Oral two times a day  senna 2 Tablet(s) Oral at bedtime  spironolactone 50 milliGRAM(s) Oral daily  tamsulosin 0.4 milliGRAM(s) Oral at bedtime      PMHX/PSHX:  GIB (gastrointestinal bleeding)  GERD with esophagitis  Hepatic encephalopathy  Obesity  Fatty liver disease, nonalcoholic  Renal stones  Hypertension  Neuropathy  Hypercholesteremia  Diabetes  S/P cholecystectomy  No significant past surgical history      Family history:  Family history of type 2 diabetes mellitus  Family history of hypertension  Family history of stomach cancer  No pertinent family history in first degree relatives    There is no family history of peptic ulcer disease, gastric cancer, colon polyps, colon cancer, celiac disease, biliary, hepatic, or pancreatic disease.  None of the female relatives have breast, uterine, or ovarian cancer.     ROS:     General:  No weight loss, fevers, chills, night sweats, fatigue  Eyes:  No vision changes, no yellowing of eyes   ENT:  No throat pain, runny nose  CV:  No chest pain, palpitations  Resp:  No SOB, cough, wheezing  GI:  See HPI  :  No burning with urination, no hematuria   Muscle:  No muscle pain, weakness  Neuro:  No numbness/tingling, memory problems  Psych:  No fatigue, insomnia, mood problems  Heme:  No easy bruisability  Skin:  No rash, itching       PHYSICAL EXAM:   Vital Signs:  Vital Signs Last 24 Hrs  T(C): 37.1 (03 Mar 2020 09:01), Max: 37.4 (03 Mar 2020 05:00)  T(F): 98.8 (03 Mar 2020 09:01), Max: 99.3 (03 Mar 2020 05:00)  HR: 87 (03 Mar 2020 09:01) (87 - 93)  BP: 120/64 (03 Mar 2020 09:01) (120/64 - 144/73)  BP(mean): --  RR: 18 (03 Mar 2020 09:01) (18 - 18)  SpO2: 96% (03 Mar 2020 09:01) (96% - 98%)  Daily     Daily     HYSICAL EXAM:     GENERAL:  Sleeping but arousable, non-toxic, well developed  HEENT:  Conjunctivae clear and pink +L periorbital ecchymoses   CHEST:  Full & symmetric excursion, no increased effort   HEART:  Regular rhythm & rate   ABDOMEN:  Soft, non-tender, non-distended, obese  EXTREMITIES:  no LE edema   SKIN:  No rash/erythema/ecchymoses/petechiae/jaundice   NEURO:  Alert, orientedx3  LABS:    LABS:                        8.2    12.32 )-----------( 92       ( 03 Mar 2020 06:12 )             24.6     Mean Cell Volume: 107.0 fl (03-03-20 @ 06:12)    03-03    130<L>  |  96  |  14  ----------------------------<  193<H>  4.5   |  24  |  1.02    Ca    9.8      03 Mar 2020 06:12    TPro  6.0  /  Alb  2.8<L>  /  TBili  8.4<H>  /  DBili  x   /  AST  24  /  ALT  14  /  AlkPhos  133<H>  03-03    LIVER FUNCTIONS - ( 03 Mar 2020 06:12 )  Alb: 2.8 g/dL / Pro: 6.0 g/dL / ALK PHOS: 133 U/L / ALT: 14 U/L / AST: 24 U/L / GGT: x           PT/INR - ( 03 Mar 2020 07:40 )   PT: 26.9 sec;   INR: 2.32 ratio                                     8.2    12.32 )-----------( 92       ( 03 Mar 2020 06:12 )             24.6                         8.3    8.73  )-----------( 90       ( 02 Mar 2020 06:40 )             24.6                         8.1    8.06  )-----------( 85       ( 01 Mar 2020 06:31 )             23.8     Imaging:

## 2020-03-03 NOTE — PROGRESS NOTE ADULT - PROBLEM SELECTOR PLAN 5
Dispo:   1.  Name of PCP: Dr. Medina  2.  PCP Contacted on Admission: [ ] Y    [ ] N    3.  PCP contacted at Discharge: [x] Y-hepatology aware, patient had a scheduled outpatient appt for today, which he is re-scheduling for next week tues    [ ] N    [ ] N/A  4.  Post-Discharge Appointment Date and Location:  5.  Summary of Handoff given to PCP:

## 2020-03-03 NOTE — PROGRESS NOTE ADULT - ASSESSMENT
64 M  with DM2, HLD, HFpEF with mild LV diastolic dysfunction, JEAN cirrhosis complicated by ascites, history of SBP, hepatic encephalopathy, and chronic anemia with a history of duodenal ulcer as well as GAVE and duodenal AVM s/p APC (last on 10/11/19), who is UNOS listed for liver transplantation at [facility], presents from home with frequent falls, found to be orthostatic on diuretics and Midodrine.

## 2020-03-03 NOTE — PROGRESS NOTE ADULT - PROBLEM SELECTOR PLAN 1
Patient has multiple etiologies for falls: positive orthostatic hypotension, weakness/deconditioning from cirrhosis, diabetic neuropathy.  - hepatology recommended to c/w Lasix 20mg, aldactone 50mg and increase midodrine 30mg tid  - s/p Albumin infusions 25% 50ml q 6 hrs x 2 days  - Echo: TTE: 75%, Normal LV function  - Elastic stocking LEGs  - PT recs-SANTANA-patient denies SANTANA, wants to go home, fall precaution  -Patient still orthostatic likely from deconditioning, cirrhosis, diabetes. Medications optimized as per hepatology. Patient declines SANTANA. Patient also declines compression stocking which I told him could help improve orthostatics. Will likely d/c today, uptrend in WBC, afebrile. Will obtain U/A.

## 2020-03-03 NOTE — DISCHARGE NOTE NURSING/CASE MANAGEMENT/SOCIAL WORK - PATIENT PORTAL LINK FT
You can access the FollowMyHealth Patient Portal offered by [facility] by registering at the following website: [email or web address]. By joining [employer]’s FollowMyHealth portal, you will also be able to view your health information using other applications (apps) compatible with our system.

## 2020-03-03 NOTE — PROGRESS NOTE ADULT - ASSESSMENT
64M well known to our practice with PMHx of JEAN cirrhosis (decompensated by h/o SBP, ascites, esophageal varices and PSE) UNOS listed for liver transplantation, h/o PUD (duodenal ulcer), GAVE, chronic anemia and DM2 who presents from home for frequent falls secondary to orthostatic hypotension from over diuresis. GI consulted     Frequent Falls    - secondary to orthostatic hypotension from over diuresis  - diuretic dosing adjusting. Currently receiving Lasix 20mg daily and Aldactone 50mg daily. Midodrine increased to 30mg TID   - CT head negative for acute intracranial pathology or hemorrhage  - CT chest negative, troponin wnl   - TTE unremarkable     JEAN cirrhosis    - being followed by hepatology team. UNOS listed for liver transplant  - MELD-Na score of 24 on 02/29/20. Today MELD-Na score is 26 (03/02/20)  - no current signs of HE or SBP; patient is mentating at baseline on lactulose 20g TID and Xifaxan 550mg BID.   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 Care appreciated     PUD     - history of GAVE and duodenal ulcers  - cont PPI daily   - GERD precautions     Advanced care planning was discussed with patient and family.  Advanced care planning forms were reviewed and discussed.  Risks, benefits and alternatives of gastroenterologic procedures were discussed in detail and all questions were answered.    30 minutes spent.

## 2020-03-03 NOTE — PROGRESS NOTE ADULT - ATTENDING COMMENTS
Remains wait-listed for liver transplantation, with UNOS MELD-Na re-certified today at 26.
Discharge planning pending U/A. Home PT. ok for d/c as per hepatology as well. Discharge planning 40 minutes.
Spoke to NP [phone]
Dispo planning, once optimized by hepatology

## 2020-03-03 NOTE — PROGRESS NOTE ADULT - ASSESSMENT
Impression:    #JEAN Cirrhosis: UNOS listed for liver transplant. MELD-Na 28 03/03/2020  Decompensated by esophageal varices, ascites, h/o hepatic encephalopathy and h/o SBP.   -Ascites: Trace on CT abdomen/pelvis from 02/11/20  -Varices: Small non-bleeding EV on EGD from 12/2019.  -HCC: No focal liver lesions on MRI abdomen 12/2019.   -PSE: No asterixis.     #Mechanical falls: Differential includes deconditioning vs. diabetic neuropathy; positive orthostatic vital signs this admission.  -TTE unremarkable this admission, less likely cardiac cause. CT head without acute neurologic findings.   -Mental status at baseline on lactulose    Recommendations:  - Continue with Lasix 20 mg daily and spironolactone 50 mg daily  - Continue with midodrine to 30 mg TID  - DC planning

## 2020-03-03 NOTE — PROGRESS NOTE ADULT - SUBJECTIVE AND OBJECTIVE BOX
Shila Restrepo MD  Division of Hospital Medicine  Pager [phone]  If no response or off hours page: [phone]  ---------------------------------------------------------    JESSICA MARTIN  64y  Male      Patient is a 64y old  Male who presents with a chief complaint of Falls (03 Mar 2020 14:02)      INTERVAL HPI/OVERNIGHT EVENTS:  No acute events overnight. Patient denies CP, SOB, palpitations, dysuria.         REVIEW OF SYSTEMS: 14 point ROS negative unless listed above    T(C): 37.3 (03-03-20 @ 12:42), Max: 37.4 (03-03-20 @ 05:00)  HR: 88 (03-03-20 @ 12:42) (87 - 93)  BP: 127/65 (03-03-20 @ 12:42) (120/64 - 144/73)  RR: 18 (03-03-20 @ 12:42) (18 - 18)  SpO2: 93% (03-03-20 @ 12:42) (93% - 98%)  Wt(kg): --Vital Signs Last 24 Hrs  T(C): 37.3 (03 Mar 2020 12:42), Max: 37.4 (03 Mar 2020 05:00)  T(F): 99.1 (03 Mar 2020 12:42), Max: 99.3 (03 Mar 2020 05:00)  HR: 88 (03 Mar 2020 12:42) (87 - 93)  BP: 127/65 (03 Mar 2020 12:42) (120/64 - 144/73)  BP(mean): --  RR: 18 (03 Mar 2020 12:42) (18 - 18)  SpO2: 93% (03 Mar 2020 12:42) (93% - 98%)    PHYSICAL EXAM:  GENERAL: NAD, well-groomed  EYES: conjunctiva and sclera clear  ENMT: Moist mucous membranes. No lesions  NECK: Supple  CHEST/LUNG: Clear to auscultation bilaterally; No rales, rhonchi, wheezing, or rubs  HEART: Regular rate and rhythm; No murmurs, rubs, or gallops  ABDOMEN: Soft, Nontender, Nondistended  EXTREMITIES: No clubbing, cyanosis, or edema  SKIN: No rashes or lesions      Consultant(s) Notes Reviewed:  [x ] YES  [ ] NO  Care Discussed with Consultants/Other Providers [ x] YES- NP Avril  [ ] NO    LABS:                        8.6    11.81 )-----------( 88       ( 03 Mar 2020 10:36 )             24.8     03-03    130<L>  |  96  |  14  ----------------------------<  193<H>  4.5   |  24  |  1.02    Ca    9.8      03 Mar 2020 06:12    TPro  6.0  /  Alb  2.8<L>  /  TBili  8.4<H>  /  DBili  x   /  AST  24  /  ALT  14  /  AlkPhos  133<H>  03-03    PT/INR - ( 03 Mar 2020 07:40 )   PT: 26.9 sec;   INR: 2.32 ratio             CAPILLARY BLOOD GLUCOSE      POCT Blood Glucose.: 195 mg/dL (03 Mar 2020 08:42)  POCT Blood Glucose.: 200 mg/dL (02 Mar 2020 21:22)  POCT Blood Glucose.: 279 mg/dL (02 Mar 2020 18:27)            RADIOLOGY & ADDITIONAL TESTS:    Imaging Personally Reviewed:  [ ] YES  [ ] NO

## 2020-03-10 ENCOUNTER — INPATIENT (INPATIENT)
Facility: HOSPITAL | Age: 65
LOS: 13 days | Discharge: HOME | DRG: 853 | End: 2020-03-24
Attending: HOSPITALIST | Admitting: HOSPITALIST
Payer: MEDICARE

## 2020-03-10 ENCOUNTER — APPOINTMENT (OUTPATIENT)
Dept: HEPATOLOGY | Facility: CLINIC | Age: 65
End: 2020-03-10

## 2020-03-10 VITALS
OXYGEN SATURATION: 100 % | HEART RATE: 92 BPM | WEIGHT: 250 LBS | DIASTOLIC BLOOD PRESSURE: 60 MMHG | SYSTOLIC BLOOD PRESSURE: 98 MMHG | RESPIRATION RATE: 18 BRPM | TEMPERATURE: 98 F | HEIGHT: 73 IN

## 2020-03-10 DIAGNOSIS — K74.60 UNSPECIFIED CIRRHOSIS OF LIVER: ICD-10-CM

## 2020-03-10 DIAGNOSIS — Z02.9 ENCOUNTER FOR ADMINISTRATIVE EXAMINATIONS, UNSPECIFIED: ICD-10-CM

## 2020-03-10 DIAGNOSIS — A41.9 SEPSIS, UNSPECIFIED ORGANISM: ICD-10-CM

## 2020-03-10 DIAGNOSIS — E11.69 TYPE 2 DIABETES MELLITUS WITH OTHER SPECIFIED COMPLICATION: ICD-10-CM

## 2020-03-10 DIAGNOSIS — I95.1 ORTHOSTATIC HYPOTENSION: ICD-10-CM

## 2020-03-10 DIAGNOSIS — R82.90 UNSPECIFIED ABNORMAL FINDINGS IN URINE: ICD-10-CM

## 2020-03-10 DIAGNOSIS — R55 SYNCOPE AND COLLAPSE: ICD-10-CM

## 2020-03-10 DIAGNOSIS — R29.6 REPEATED FALLS: ICD-10-CM

## 2020-03-10 DIAGNOSIS — D53.9 NUTRITIONAL ANEMIA, UNSPECIFIED: ICD-10-CM

## 2020-03-10 DIAGNOSIS — R53.1 WEAKNESS: ICD-10-CM

## 2020-03-10 DIAGNOSIS — Z90.49 ACQUIRED ABSENCE OF OTHER SPECIFIED PARTS OF DIGESTIVE TRACT: Chronic | ICD-10-CM

## 2020-03-10 DIAGNOSIS — I10 ESSENTIAL (PRIMARY) HYPERTENSION: ICD-10-CM

## 2020-03-10 LAB
ALBUMIN SERPL ELPH-MCNC: 3 G/DL — LOW (ref 3.3–5)
ALP SERPL-CCNC: 154 U/L — HIGH (ref 40–120)
ALT FLD-CCNC: 20 U/L — SIGNIFICANT CHANGE UP (ref 10–45)
ANION GAP SERPL CALC-SCNC: 15 MMOL/L — SIGNIFICANT CHANGE UP (ref 5–17)
APPEARANCE UR: ABNORMAL
APTT BLD: 34.2 SEC — SIGNIFICANT CHANGE UP (ref 27.5–36.3)
AST SERPL-CCNC: 28 U/L — SIGNIFICANT CHANGE UP (ref 10–40)
BACTERIA # UR AUTO: ABNORMAL
BASOPHILS # BLD AUTO: 0.14 K/UL — SIGNIFICANT CHANGE UP (ref 0–0.2)
BASOPHILS NFR BLD AUTO: 0.9 % — SIGNIFICANT CHANGE UP (ref 0–2)
BILIRUB SERPL-MCNC: 14.1 MG/DL — HIGH (ref 0.2–1.2)
BILIRUB UR-MCNC: NEGATIVE — SIGNIFICANT CHANGE UP
BUN SERPL-MCNC: 29 MG/DL — HIGH (ref 7–23)
CALCIUM SERPL-MCNC: 10.7 MG/DL — HIGH (ref 8.4–10.5)
CHLORIDE SERPL-SCNC: 94 MMOL/L — LOW (ref 96–108)
CK SERPL-CCNC: 31 U/L — SIGNIFICANT CHANGE UP (ref 30–200)
CO2 SERPL-SCNC: 23 MMOL/L — SIGNIFICANT CHANGE UP (ref 22–31)
COLOR SPEC: YELLOW — SIGNIFICANT CHANGE UP
CREAT SERPL-MCNC: 1.15 MG/DL — SIGNIFICANT CHANGE UP (ref 0.5–1.3)
DIFF PNL FLD: ABNORMAL
EOSINOPHIL # BLD AUTO: 0 K/UL — SIGNIFICANT CHANGE UP (ref 0–0.5)
EOSINOPHIL NFR BLD AUTO: 0 % — SIGNIFICANT CHANGE UP (ref 0–6)
GLUCOSE BLDC GLUCOMTR-MCNC: 227 MG/DL — HIGH (ref 70–99)
GLUCOSE BLDC GLUCOMTR-MCNC: 232 MG/DL — HIGH (ref 70–99)
GLUCOSE SERPL-MCNC: 209 MG/DL — HIGH (ref 70–99)
GLUCOSE UR QL: ABNORMAL
HCT VFR BLD CALC: 28 % — LOW (ref 39–50)
HGB BLD-MCNC: 9.7 G/DL — LOW (ref 13–17)
HYALINE CASTS # UR AUTO: 0 /LPF — SIGNIFICANT CHANGE UP (ref 0–2)
INR BLD: 1.91 RATIO — HIGH (ref 0.88–1.16)
KETONES UR-MCNC: NEGATIVE — SIGNIFICANT CHANGE UP
LEUKOCYTE ESTERASE UR-ACNC: ABNORMAL
LYMPHOCYTES # BLD AUTO: 0.27 K/UL — LOW (ref 1–3.3)
LYMPHOCYTES # BLD AUTO: 1.7 % — LOW (ref 13–44)
MCHC RBC-ENTMCNC: 34.6 GM/DL — SIGNIFICANT CHANGE UP (ref 32–36)
MCHC RBC-ENTMCNC: 37.7 PG — HIGH (ref 27–34)
MCV RBC AUTO: 108.9 FL — HIGH (ref 80–100)
MONOCYTES # BLD AUTO: 0.82 K/UL — SIGNIFICANT CHANGE UP (ref 0–0.9)
MONOCYTES NFR BLD AUTO: 5.2 % — SIGNIFICANT CHANGE UP (ref 2–14)
NEUTROPHILS # BLD AUTO: 14.52 K/UL — HIGH (ref 1.8–7.4)
NEUTROPHILS NFR BLD AUTO: 91.3 % — HIGH (ref 43–77)
NITRITE UR-MCNC: NEGATIVE — SIGNIFICANT CHANGE UP
PH UR: 5.5 — SIGNIFICANT CHANGE UP (ref 5–8)
PLATELET # BLD AUTO: 111 K/UL — LOW (ref 150–400)
POTASSIUM SERPL-MCNC: 4 MMOL/L — SIGNIFICANT CHANGE UP (ref 3.5–5.3)
POTASSIUM SERPL-SCNC: 4 MMOL/L — SIGNIFICANT CHANGE UP (ref 3.5–5.3)
PROT SERPL-MCNC: 7.1 G/DL — SIGNIFICANT CHANGE UP (ref 6–8.3)
PROT UR-MCNC: ABNORMAL
PROTHROM AB SERPL-ACNC: 22.4 SEC — HIGH (ref 10–12.9)
RBC # BLD: 2.57 M/UL — LOW (ref 4.2–5.8)
RBC # FLD: 15.3 % — HIGH (ref 10.3–14.5)
RBC CASTS # UR COMP ASSIST: 5 /HPF — HIGH (ref 0–4)
SODIUM SERPL-SCNC: 132 MMOL/L — LOW (ref 135–145)
SP GR SPEC: 1.01 — SIGNIFICANT CHANGE UP (ref 1.01–1.02)
UROBILINOGEN FLD QL: ABNORMAL
WBC # BLD: 15.75 K/UL — HIGH (ref 3.8–10.5)
WBC # FLD AUTO: 15.75 K/UL — HIGH (ref 3.8–10.5)
WBC UR QL: 35 /HPF — HIGH (ref 0–5)

## 2020-03-10 PROCEDURE — 99285 EMERGENCY DEPT VISIT HI MDM: CPT

## 2020-03-10 PROCEDURE — 76705 ECHO EXAM OF ABDOMEN: CPT | Mod: 26

## 2020-03-10 PROCEDURE — 93010 ELECTROCARDIOGRAM REPORT: CPT

## 2020-03-10 PROCEDURE — 71045 X-RAY EXAM CHEST 1 VIEW: CPT | Mod: 26

## 2020-03-10 PROCEDURE — 99233 SBSQ HOSP IP/OBS HIGH 50: CPT | Mod: GC

## 2020-03-10 PROCEDURE — 70450 CT HEAD/BRAIN W/O DYE: CPT | Mod: 26

## 2020-03-10 PROCEDURE — 99223 1ST HOSP IP/OBS HIGH 75: CPT

## 2020-03-10 RX ORDER — LACTULOSE 10 G/15ML
30 SOLUTION ORAL
Qty: 0 | Refills: 0 | DISCHARGE

## 2020-03-10 RX ORDER — MIDODRINE HYDROCHLORIDE 2.5 MG/1
30 TABLET ORAL THREE TIMES A DAY
Refills: 0 | Status: DISCONTINUED | OUTPATIENT
Start: 2020-03-10 | End: 2020-03-15

## 2020-03-10 RX ORDER — DEXTROSE 50 % IN WATER 50 %
12.5 SYRINGE (ML) INTRAVENOUS ONCE
Refills: 0 | Status: DISCONTINUED | OUTPATIENT
Start: 2020-03-10 | End: 2020-03-15

## 2020-03-10 RX ORDER — SODIUM CHLORIDE 9 MG/ML
1000 INJECTION, SOLUTION INTRAVENOUS
Refills: 0 | Status: DISCONTINUED | OUTPATIENT
Start: 2020-03-10 | End: 2020-03-15

## 2020-03-10 RX ORDER — LACTULOSE 10 G/15ML
20 SOLUTION ORAL THREE TIMES A DAY
Refills: 0 | Status: DISCONTINUED | OUTPATIENT
Start: 2020-03-10 | End: 2020-03-15

## 2020-03-10 RX ORDER — ERTAPENEM SODIUM 1 G/1
1000 INJECTION, POWDER, LYOPHILIZED, FOR SOLUTION INTRAMUSCULAR; INTRAVENOUS ONCE
Refills: 0 | Status: COMPLETED | OUTPATIENT
Start: 2020-03-10 | End: 2020-03-10

## 2020-03-10 RX ORDER — ONDANSETRON 8 MG/1
4 TABLET, FILM COATED ORAL EVERY 6 HOURS
Refills: 0 | Status: DISCONTINUED | OUTPATIENT
Start: 2020-03-10 | End: 2020-03-15

## 2020-03-10 RX ORDER — PANTOPRAZOLE SODIUM 20 MG/1
40 TABLET, DELAYED RELEASE ORAL
Refills: 0 | Status: DISCONTINUED | OUTPATIENT
Start: 2020-03-10 | End: 2020-03-15

## 2020-03-10 RX ORDER — GLUCAGON INJECTION, SOLUTION 0.5 MG/.1ML
1 INJECTION, SOLUTION SUBCUTANEOUS ONCE
Refills: 0 | Status: DISCONTINUED | OUTPATIENT
Start: 2020-03-10 | End: 2020-03-15

## 2020-03-10 RX ORDER — TAMSULOSIN HYDROCHLORIDE 0.4 MG/1
0.4 CAPSULE ORAL AT BEDTIME
Refills: 0 | Status: DISCONTINUED | OUTPATIENT
Start: 2020-03-10 | End: 2020-03-15

## 2020-03-10 RX ORDER — INSULIN GLARGINE 100 [IU]/ML
12 INJECTION, SOLUTION SUBCUTANEOUS AT BEDTIME
Refills: 0 | Status: DISCONTINUED | OUTPATIENT
Start: 2020-03-10 | End: 2020-03-13

## 2020-03-10 RX ORDER — ALBUMIN HUMAN 25 %
100 VIAL (ML) INTRAVENOUS ONCE
Refills: 0 | Status: COMPLETED | OUTPATIENT
Start: 2020-03-10 | End: 2020-03-10

## 2020-03-10 RX ORDER — INSULIN LISPRO 100/ML
6 VIAL (ML) SUBCUTANEOUS
Refills: 0 | Status: DISCONTINUED | OUTPATIENT
Start: 2020-03-10 | End: 2020-03-13

## 2020-03-10 RX ORDER — SODIUM CHLORIDE 9 MG/ML
500 INJECTION INTRAMUSCULAR; INTRAVENOUS; SUBCUTANEOUS ONCE
Refills: 0 | Status: COMPLETED | OUTPATIENT
Start: 2020-03-10 | End: 2020-03-10

## 2020-03-10 RX ORDER — ERTAPENEM SODIUM 1 G/1
1000 INJECTION, POWDER, LYOPHILIZED, FOR SOLUTION INTRAMUSCULAR; INTRAVENOUS EVERY 24 HOURS
Refills: 0 | Status: DISCONTINUED | OUTPATIENT
Start: 2020-03-11 | End: 2020-03-11

## 2020-03-10 RX ORDER — FOLIC ACID 0.8 MG
1 TABLET ORAL DAILY
Refills: 0 | Status: DISCONTINUED | OUTPATIENT
Start: 2020-03-10 | End: 2020-03-15

## 2020-03-10 RX ORDER — DEXTROSE 50 % IN WATER 50 %
25 SYRINGE (ML) INTRAVENOUS ONCE
Refills: 0 | Status: DISCONTINUED | OUTPATIENT
Start: 2020-03-10 | End: 2020-03-15

## 2020-03-10 RX ORDER — INSULIN LISPRO 100/ML
VIAL (ML) SUBCUTANEOUS
Refills: 0 | Status: DISCONTINUED | OUTPATIENT
Start: 2020-03-10 | End: 2020-03-15

## 2020-03-10 RX ORDER — DEXTROSE 50 % IN WATER 50 %
15 SYRINGE (ML) INTRAVENOUS ONCE
Refills: 0 | Status: DISCONTINUED | OUTPATIENT
Start: 2020-03-10 | End: 2020-03-15

## 2020-03-10 RX ADMIN — TAMSULOSIN HYDROCHLORIDE 0.4 MILLIGRAM(S): 0.4 CAPSULE ORAL at 22:23

## 2020-03-10 RX ADMIN — Medication 4: at 18:19

## 2020-03-10 RX ADMIN — MIDODRINE HYDROCHLORIDE 30 MILLIGRAM(S): 2.5 TABLET ORAL at 18:20

## 2020-03-10 RX ADMIN — Medication 6 UNIT(S): at 18:52

## 2020-03-10 RX ADMIN — ERTAPENEM SODIUM 120 MILLIGRAM(S): 1 INJECTION, POWDER, LYOPHILIZED, FOR SOLUTION INTRAMUSCULAR; INTRAVENOUS at 15:41

## 2020-03-10 RX ADMIN — Medication 50 MILLILITER(S): at 16:47

## 2020-03-10 RX ADMIN — SODIUM CHLORIDE 500 MILLILITER(S): 9 INJECTION INTRAMUSCULAR; INTRAVENOUS; SUBCUTANEOUS at 13:26

## 2020-03-10 RX ADMIN — LACTULOSE 20 GRAM(S): 10 SOLUTION ORAL at 22:23

## 2020-03-10 RX ADMIN — INSULIN GLARGINE 12 UNIT(S): 100 INJECTION, SOLUTION SUBCUTANEOUS at 22:22

## 2020-03-10 NOTE — ED PROVIDER NOTE - ATTENDING CONTRIBUTION TO CARE
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 Falls likely secondary to known orthostatic hypotension. Not a safe discharge home, will require rehab/placement. Also with ?urinary retention. Will obtian bladder scan, labs, ekg, CTH, admit

## 2020-03-10 NOTE — ED ADULT TRIAGE NOTE - CHIEF COMPLAINT QUOTE
Syncopal episode at home, LOC for approx 3 minutes per wife, pt had episode of shaking during that time. Pt reporting abdominal pain for X 1day, no nausea or vomiting, endorses loose stool on lactulose at home. Hx of liver failure, on liver transplant list Syncopal episode at home at 0430 this am, LOC for approx 3 minutes per wife, pt had episode of shaking during that time. Pt reporting abdominal pain for X 1day, no nausea or vomiting, endorses loose stool on lactulose at home. Hx of liver failure, on liver transplant list. hx of DM, FS in triage 218 Syncopal episode at home at 0430 this am, LOC for approx 3 minutes per wife, pt had episode of shaking during that time. Hx of multiple falls. Pt reporting abdominal pain for X 1day, no nausea or vomiting, endorses loose stool on lactulose at home. Hx of liver failure, on liver transplant list. hx of DM, FS in triage 218

## 2020-03-10 NOTE — H&P ADULT - NSHPLABSRESULTS_GEN_ALL_CORE
Lab results are reviewed by me   : WBC = 15.75 ( dc WBC was 11.81 )  H/H = 9.7/28.0  PLT= 111  MCV = 108.9  INR = 1.91   Na = 132   Cr= 4.0   Cr = 1.15  TBili = 14.1  Glu = 209  U/A pos bianka and WBC = 35 with pos bacteria   Head CT : no acute events

## 2020-03-10 NOTE — ED PROVIDER NOTE - CLINICAL SUMMARY MEDICAL DECISION MAKING FREE TEXT BOX
(0) lying quietly, normal position, moves easily/(0) normal position or relaxed/(0) no cry (awake or asleep)/(0) content, relaxed/(0) no particular expression or smile
63 yo M with extensive PMH including orthostatic hypotension with multiple falls at home, presenting with 2 falls at home this am. Patient also complaining of increased loose BM's x2 days and urinary urgency and hesitancy x1 day a/w suprapubic tenderness. On exam, no new neurologic deficits. Patient noted to have recent hospitalization for same chief complaint. Now with multiple falls s/p increase in midodrine. Presentation consistent with vasovagal syncope vs. fall due to orthostatic hypotension. Seizure unlikely. Currently not safe for discharge home, requires hospitalization for further management and stabilization. May require dispo to rehab or nursing facility.     1. Urinary symptoms: Bladder scan to r/o urinary retention, UA, urine culture  2. Syncope: EKG, cardiac monitor, Head CT, IVF bolus, CK to r/o seizure  3. DM: POC glucose  4. Liver cirrhosis: CBC, CMP, aPTT

## 2020-03-10 NOTE — H&P ADULT - PROBLEM SELECTOR PLAN 6
No current evidence of SBP ( no abd pain or tenderness , no gross evidence of ascites on exam   if pt start to have abd pain , will get US to search for any fluid pocket for abd paracentesis B12 was high in 12/19  Folate was 10.9 in 12/19  cont folate   Hemoglobin is currently stable

## 2020-03-10 NOTE — H&P ADULT - REASON FOR ADMISSION
Syncopal episode at home at 0430 this am, LOC for approx 3 minutes per wife, pt had episode of shaking during that time. Hx of multiple falls. Pt reporting abdominal pain for X 1day, no nausea or vomiting, endorses loose stool on lactulose at home

## 2020-03-10 NOTE — CONSULT NOTE ADULT - ASSESSMENT
Impression:    #JEAN Cirrhosis: UNOS listed for liver transplant. MELD-Na 28 3/10  Decompensated by esophageal varices, ascites, h/o hepatic encephalopathy and h/o SBP.   -Ascites: Trace on CT abdomen/pelvis from 02/11/20  -Varices: Small non-bleeding EV on EGD from 12/2019.  -HCC: No focal liver lesions on MRI abdomen 12/2019.   -PSE: No asterixis.     #Recurrent falls: Differential includes deconditioning vs. diabetic neuropathy. Cannot rule out infectious work up  Previous work up from previous hospitalization:    -TTE unremarkable this admission, less likely cardiac cause. CT head without acute neurologic findings.   -Mental status at baseline on lactulose    Recommendations:  - Hold diuretics for now  - Please obtain full infectious work up: CXR, UA, blood cultures  - Continue with midodrine 30mg TID  - Continue Lactulose and Rifaximin Impression:    #JEAN Cirrhosis: UNOS listed for liver transplant. MELD-Na 28 3/10  Decompensated by esophageal varices, ascites, h/o hepatic encephalopathy and h/o SBP.   -Ascites: Trace on CT abdomen/pelvis from 02/11/20  -Varices: Small non-bleeding EV on EGD from 12/2019.  -HCC: No focal liver lesions on MRI abdomen 12/2019.   -PSE: No asterixis.     #Recurrent falls: Differential includes deconditioning vs. diabetic neuropathy. Cannot rule out infectious work up  Previous work up from previous hospitalization:    -TTE unremarkable this admission, less likely cardiac cause. CT head without acute neurologic findings.   -Mental status at baseline on lactulose    Recommendations:  - Start empiric antibiotics for presumed recurrent UTI with meropenem (given past history of ESBL E Coli)  - Hold diuretics for now, give albumin 25% 100 mL iv q8h  - Please obtain full infectious work up: CXR, UA, blood cultures  - Will consult Transplant ID but may require repeat CT scan given recurrent UTI and recent emphysematous cystitis, to rule out abscess  - Continue with midodrine 30mg TID  - Continue Lactulose and Rifaximin

## 2020-03-10 NOTE — ED ADULT NURSE NOTE - NSIMPLEMENTINTERV_GEN_ALL_ED
Implemented All Fall Risk Interventions:  [place] to call system. Call bell, personal items and telephone within reach. Instruct patient to call for assistance. Room bathroom lighting operational. Non-slip footwear when patient is off stretcher. Physically safe environment: no spills, clutter or unnecessary equipment. Stretcher in lowest position, wheels locked, appropriate side rails in place. Provide visual cue, wrist band, yellow gown, etc. Monitor gait and stability. Monitor for mental status changes and reorient to person, place, and time. Review medications for side effects contributing to fall risk. Reinforce activity limits and safety measures with patient and family.

## 2020-03-10 NOTE — ED PROVIDER NOTE - FAMILY HISTORY
Mother  Still living? Unknown  Family history of stomach cancer, Age at diagnosis: Age Unknown  Family history of hypertension, Age at diagnosis: Age Unknown  Family history of type 2 diabetes mellitus, Age at diagnosis: Age Unknown

## 2020-03-10 NOTE — ED ADULT NURSE NOTE - OBJECTIVE STATEMENT
63 yo M arrived from triage c/o of syncope and seizure like activity. As per family pt visited ED last week for same symptoms. Reports falling at approx 4am this morning, and later on at approx 8 am where family reports pt was "shaking with eyes open and unresponsive". PMH Non alcoholic liver disease. IV line placed labs drawn and sent. Provider at bedside evaluating.

## 2020-03-10 NOTE — H&P ADULT - PROBLEM SELECTOR PLAN 8
Hepatology team is following patient BP is stable currently   in setting of sepsis and weakness / will hold diuretics

## 2020-03-10 NOTE — H&P ADULT - PROBLEM SELECTOR PLAN 3
POS Pyuria with urinary symptoms   H/O ESBL Bateremia most likely from Urine with blood and UCX pos for ESBL E coli in 2/11/20  s/P Ertapenem 1 gram in the ED   Will continue Ertapenem   F/UP UCX / Blood CX   Pt had a CT Of abd done in 2/11/20 with NON obstructing renal calculus / will get CT of abd with no contrast to check on the status of the stone   High risk for decompensation in pt with UTI with renal stones / close monitoring

## 2020-03-10 NOTE — H&P ADULT - PROBLEM SELECTOR PLAN 1
POS Pyuria with urinary symptoms   H/O ESBL Bateremia most likely from Urine with blood and UCX pos for ESBL E coli in 2/11/20  s/P Ertapenem 1 gram in the ED   Will continue Ertapenem   F/UP UCX / Blood CX   Pt had a CT Of abd done in 2/11/20 with NON obstructing renal calculus / will get CT of abd with no contrast to check on the status of the stone   High risk for decompensation in pt with UTI with renal stones / close monitoring   S/P IVF in the ED and ED provider order albumin

## 2020-03-10 NOTE — ED ADULT NURSE NOTE - CHPI ED NUR SYMPTOMS NEG
no loss of consciousness/no vomiting/no dizziness/no numbness/no weakness/no change in level of consciousness/no confusion/no blurred vision/no nausea/no fever

## 2020-03-10 NOTE — CONSULT NOTE ADULT - ATTENDING COMMENTS
63 yo M with IDDM, dyslipidemia, obesity, GERD, HFpEF with mild LV diastolic dysfunction, and decompensated JEAN cirrhosis complicated by ascites, history of SBP, hepatic encephalopathy, and chronic anemia with a history of duodenal ulcer as well as GAVE and duodenal AVM s/p APC (last on 10/11/19), who is UNOS listed for liver transplantation at [facility]. He has had multiple recent hospitalizations, including hospitalization from 2/11/20-2/21/20 due to septic shock secondary to emphysematous cystitis and ESBL E. coli bacteremia and from 2/28/20-3/3/20 after a mechanical fall. He currently presents after another mechanical fall likely preceded by vasovagal syncope, with lower urinary tract symptoms and urinary retention as well as neutrophilic leukocytosis and worsened jaundice all suggestive of recurrent urosepsis. He also has mild hepatic encephalopathy manifested by faint asterixis and mild psychomotor slowing on exam compared to his normal baseline, likely provoked by the infection.    # Urosepsis: Given recent history of emphysematous cystitis and ESBL E. coli bacteremia and current lower urinary tract symptoms and positive urinalysis, recommend empirically starting treatment with a carbapenem while awaiting the results of his urine and blood cultures (sent and pending). S/p  mL iv bolus x1 in ER, and recommend continued fluid resuscitation with albumin 25% 100 mL iv q8h x24h. Will discuss with Transplant ID, as given prior history of emphysematous cystitis and recurrent infection, may need CT imaging to rule out abscess.    # Ascites: Currently appears to have minimal ascites and no peripheral edema on exam and in fact appears intravascularly volume depleted. Recommend to hold diuretics for now and give IV albumin, as above. Recommend POCUS to assess for pocket of ascites and, if identified, diagnostic paracentesis to rule out SBP (with fluid studies sent for cell count and bacterial cultures).    # Hepatic encephalopathy: Mild, with faint asterixis on exam and mild psychomotor slowing compared to his normal baseline. Likely provoked by infection. Continue home rifaximin and lactulose therapies and will adjust dosing based on clinical response.    # Hyponatremia: Currently appears hypovolemic, now s/p NS bolus and recommending IV albumin as above. Defer fluid restriction for now.    # Non-oliguric MISA: Had evidence of urinary retention, now s/p indwelling catheter placement. Likely also with pre-renal azotemia in the setting of sepsis and intravascular volume depletion, with plan for IV albumin as above. Monitor I/Os. Hold diuretics for now.    # Chronic anemia secondary to chronic GI blood loss: No recent overt bleeding. Hb/HCT stable compared to his baseline.    # Decompensated JEAN cirrhosis: He is currently UNOS wait-listed for liver transplantation at [facility], blood type A, with current MELD-Na 27 (re-certified today), but will need resolution of active infection prior to proceeding with transplantation.    Please don't hesitate to call with any questions/concerns.    Letitia Mo M.D., Ph.D.  Transplant Hepatology  Cell: [phone]

## 2020-03-10 NOTE — ED PROVIDER NOTE - OBJECTIVE STATEMENT
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 First fall occurred at 4:30am, while patient was sitting on the toilet. He says he felt weak and fell forward off the toilet, but was able to catch himself on the bars he has near his toilet. No head trauma, no LOC. Patient then went back to sleep. At 8am patient stood up from bed, took a few steps, and fell again, this time onto his knees. He denies head trauma, LOC. Wife and daughter, at bedside, say patient was unresponsive with eyes open for about 3 minutes after falling, was seen shaking, mouth was open, no bowel or bladder incontinence. Patient took about 3-4 minutes to fully recover and come back to himself. Both falls were witnessed by family members who were assisting patient with ambulation. Patient takes insulin before meals and at bedtime, but did not take his insulin this morning. Patient is A&O x3, denies recent fevers, headaches, nausea, vomiting. He does endorse a 1-day history of suprapubic "pressure" associated with increased urgency and hesitancy. No dyusuria. Also endorsing 2-day history of increased loose BMs. Patient's wife held last night's dose of lactulose due to increased number of BMs.

## 2020-03-10 NOTE — ED PROVIDER NOTE - PHYSICAL EXAMINATION
GENERAL: NAD, well-developed  HEAD:  Atraumatic, Normocephalic  EYES: EOMI, PERRLA, sclera slightly yellow  NECK: Supple, No JVD  CHEST/LUNG: Clear to auscultation bilaterally; No wheeze  HEART: Regular rate and rhythm; No murmurs, rubs, or gallops  ABDOMEN: Soft, suprapubic tenderness, Nondistended; Bowel sounds present  EXTREMITIES:  2+ Peripheral Pulses, No clubbing, cyanosis, or edema  PSYCH: AAOx3  NEUROLOGY: b/l lower extremity numbness to level of ankles. Pulses intact bilaterally. Muscle strength 5/5 in all 4 extremities.   SKIN: No rashes or lesions

## 2020-03-10 NOTE — ED ADULT NURSE NOTE - CHIEF COMPLAINT QUOTE
Syncopal episode at home at 0430 this am, LOC for approx 3 minutes per wife, pt had episode of shaking during that time. Hx of multiple falls. Pt reporting abdominal pain for X 1day, no nausea or vomiting, endorses loose stool on lactulose at home. Hx of liver failure, on liver transplant list. hx of DM, FS in triage 218

## 2020-03-10 NOTE — H&P ADULT - PROBLEM SELECTOR PLAN 9
Pt is on Lantus and Humalog at  home   A1C = 6.4 on 2/2020   cont glucose monitoring   Cont Lantus 12 units at night / Humalog 6 units before meals   Glucose consistent diet   Insulin sliding scale

## 2020-03-10 NOTE — ED PROVIDER NOTE - NS ED ROS FT
Constitutional: [  ] fevers [ ] chills [ ] weight loss [ ] weight gain [  ] diaphoresis  HEENT: [ ] dry eyes [ ] eye irritation [ ] postnasal drip [ ] nasal congestion  CV: [  ] chest pain [ ] orthopnea [ ] palpitations [ ] murmur  Resp: [  ] cough [ ] shortness of breath [ ] dyspnea [ ] wheezing [ ] sputum [ ] hemoptysis  GI: [ ] nausea [ ] vomiting [x] increase in loose bowel movements  [ ] constipation [x] abd pain [ ] dysphagia   : [ ] dysuria [ ] nocturia [ ] hematuria [ ] increased urinary frequency [x] urgency [x] hesitancy   Musculoskeletal: [ ] back pain [ ] myalgias [ ] arthralgias [ ] fracture  Skin: [ ] rash [ ] itch  Neurological: [ ] headache [ ] dizziness [ ] syncope [x] weakness [ ] numbness  Psychiatric: [ ] anxiety [ ] depression  Endocrine: [ ] diabetes [ ] thyroid problem  Hematologic/Lymphatic: [ ] anemia [ ] bleeding problem  Allergic/Immunologic: [ ] itchy eyes [ ] nasal discharge [ ] hives [ ] angioedema  [x] All other systems negative

## 2020-03-10 NOTE — H&P ADULT - PROBLEM SELECTOR PLAN 5
Same as above  HOLD DIuretics No current evidence of SBP ( no abd pain or tenderness , no gross evidence of ascites on exam   if pt start to have abd pain , will get US to search for any fluid pocket for abd paracentesis  MELD Score of 27 f/up with Hepatology team   Plt is >100 today and INR = 1.9 / will hold Chemical DVT prophylaxis today , but if platelet remains stable > 100 K and no bleed / consider Chemical DVT Prophylaxis / in the meantime, SCD today

## 2020-03-10 NOTE — H&P ADULT - HISTORY OF PRESENT ILLNESS
64M with IDDM, dyslipidemia, obesity, GERD, HFpEF with mild LV diastolic dysfunction, and decompensated JEAN cirrhosis complicated by ascites, history of SBP, hepatic encephalopathy, and chronic anemia with a history of duodenal ulcer as well as GAVE and duodenal AVM s/p APC (last on 10/11/19), who is UNOS listed for liver transplantation at [facility] who presents with fall at home. 64M with DM2,  dyslipidemia, obesity, BPH, Orthostatic hypotension, GERD, HTN, Neuropathy, Obesity, HFpEF with mild LV diastolic dysfunction, and decompensated JEAN cirrhosis complicated by ascites, history of SBP, hepatic encephalopathy, and chronic anemia with a history of duodenal ulcer as well as GAVE and duodenal AVM s/p APC (last on 10/11/19), who is UNOS listed for liver transplantation at [facility] who presents with fall at home.       ED   :   Pt received NS 500ml and one dose of Ertapenem due to abnormal urinalysis, blood and UCX were sent    Previous admissions :  2/28/20 - 3/02/20 for falls where SANTANA  was recommended but pt declined and was sent home .  Pt was on Lasix 40 mg daily alternating with 20mg which was changed to Lasix 20 mg oral daily and Midodrine increased from 20 mg oral THree times daily to 30 mg three times a day. 64M with DM2,  dyslipidemia, obesity, BPH, Orthostatic hypotension, GERD, HTN, Neuropathy, Obesity, HFpEF with mild LV diastolic dysfunction, and decompensated JEAN cirrhosis complicated by ascites, history of SBP, hepatic encephalopathy, and chronic anemia with a history of duodenal ulcer as well as GAVE and duodenal AVM s/p APC (last on 10/11/19), who is UNOS listed for liver transplantation at [facility] who presents with fall at home, general weakness and ? syncope.  Patient states that he was doing ok after dc from hospital and about Saturday 3/7, he started feeling generally weak and had diarrhea where he was moving his BM every 15 mns and his wife told him to hold the Lactulose which he did.  He was was having the constant urge to urinate .  However, yesterday , the Diarrhea stopped but he could not urinate.  Pt denies any nausea, vomiting, abd pain , no cough, no fever or chills or chest pain .  NO travel of sick contact.  NO visitors to his house ( of note, patient's youngest child in high school was told to stay at home for 14 days from school , not because of known exposure as per father .  It is because he did not go to school because he had some knee pain for one day as per father.    ED   :   Vitals are stable Pt received NS 500ml and one dose of Ertapenem due to abnormal urinalysis, blood and UCX were sent    Previous admissions :  2/28/20 - 3/03/20 for falls where SANTANA  was recommended but pt declined and was sent home .  Pt was on Lasix 40 mg daily alternating with 20mg which was changed to Lasix 20 mg oral daily and Midodrine increased from 20 mg oral THree times daily to 30 mg three times a day.

## 2020-03-10 NOTE — CONSULT NOTE ADULT - SUBJECTIVE AND OBJECTIVE BOX
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 This is patient's third presentation to the hospital this year. Pt is currently asymptomatic currently however states that the first fall occurred at 4.30am, while he was sitting on the toilet. He felt weak and fell forward, but was able to catch himself on the bars. At 8am, patient got up from bed, took a few steps, and fell again, this time onto his knees. Pt denies lightheadedness, chest pain or any prodromal syndrome prior to the fall, but wife noted that patient was unresponsive with eyes open for about 3 mins after falling, was shaking, mouth open. Pt continues to be compliant with all his medications. During his last admission, lasix was decreased to 20mg and aldactone to 50mg in light of recurrent falls. 2 admission ago, patient had septic shock 2/2 ESBL UTI.     Allergies:  codeine (Anaphylaxis)      Home Medications:    Hospital Medications:  sodium chloride 0.9% Bolus 500 milliLiter(s) IV Bolus once      PMHX/PSHX:  GIB (gastrointestinal bleeding)  GERD with esophagitis  Hepatic encephalopathy  Obesity  Fatty liver disease, nonalcoholic  Renal stones  Hypertension  Neuropathy  Hypercholesteremia  Diabetes  S/P cholecystectomy  No significant past surgical history      Family history:  Family history of type 2 diabetes mellitus (Mother)  Family history of hypertension (Mother)  Family history of stomach cancer (Mother)  No pertinent family history in first degree relatives      There is no family history of peptic ulcer disease, gastric cancer, colon polyps, colon cancer, celiac disease, biliary, hepatic, or pancreatic disease.  None of the female relatives have breast, uterine, or ovarian cancer.     Social History:     ROS:     General:  No wt loss, fevers, chills, night sweats, fatigue,   Eyes:  Good vision, no reported pain  ENT:  No sore throat, pain, runny nose, dysphagia  CV:  No pain, palpitations, hypo/hypertension  Resp:  No dyspnea, cough, tachypnea, wheezing  GI:  See HPI   :  No pain, bleeding, incontinence, nocturia  Muscle:  No pain, weakness  Neuro:  No weakness, tingling, memory problems  Psych:  No fatigue, insomnia, mood problems, depression  Endocrine:  No polyuria, polydipsia, cold/heat intolerance  Heme:  No petechiae, ecchymosis, easy bruisability  Skin:  No rash, tattoos, scars, edema      PHYSICAL EXAM:     GENERAL:  Appears stated age, well-groomed  HEENT:  NC/AT,  conjunctivae clear and pink, no thyromegaly  CHEST:  Full & symmetric excursion, no increased effort, breath sounds clear  HEART:  Regular rhythm, S1, S2, no murmur/rub/S3/S4  ABDOMEN:  Soft, non-tender, non-distended, normoactive bowel sounds  EXTEREMITIES:  no cyanosis,clubbing or edema  SKIN:  No rash/erythema/ecchymoses  NEURO:  Alert, oriented, no asterixis    Vital Signs:  Vital Signs Last 24 Hrs  T(C): 36.5 (10 Mar 2020 09:56), Max: 36.5 (10 Mar 2020 09:56)  T(F): 97.7 (10 Mar 2020 09:56), Max: 97.7 (10 Mar 2020 09:56)  HR: 92 (10 Mar 2020 09:56) (92 - 92)  BP: 98/60 (10 Mar 2020 09:56) (98/60 - 98/60)  BP(mean): --  RR: 18 (10 Mar 2020 09:56) (18 - 18)  SpO2: 100% (10 Mar 2020 09:56) (100% - 100%)  Daily Height in cm: 185.42 (10 Mar 2020 09:56)    Daily     LABS:                      Imaging: Chief Complaint:  Patient is a 64y old  Male who presents with a chief complaint of     HPI:  64M with IDDM, dyslipidemia, obesity, GERD, HFpEF with mild LV diastolic dysfunction, and decompensated JEAN cirrhosis complicated by ascites, history of SBP, hepatic encephalopathy, and chronic anemia with a history of duodenal ulcer as well as GAVE and duodenal AVM s/p APC (last on 10/11/19), who is UNOS listed for liver transplantation at [facility] who presents with fall at home. This is patient's third presentation to the hospital this year. Pt is currently asymptomatic currently however states that the first fall occurred at 4.30am, while he was sitting on the toilet. He felt weak and fell forward, but was able to catch himself on the bars. At 8am, patient got up from bed, took a few steps, and fell again, this time onto his knees. Pt denies lightheadedness, chest pain or any prodromal syndrome prior to the fall, but wife noted that patient was unresponsive with eyes open for about 3 mins after falling, was shaking, mouth open. Pt continues to be compliant with all his medications. During his last admission, lasix was decreased to 20mg and aldactone to 50mg in light of recurrent falls. 2 admission ago, patient had septic shock 2/2 ESBL UTI.     Allergies:  codeine (Anaphylaxis)      Home Medications:    Hospital Medications:  sodium chloride 0.9% Bolus 500 milliLiter(s) IV Bolus once      PMHX/PSHX:  GIB (gastrointestinal bleeding)  GERD with esophagitis  Hepatic encephalopathy  Obesity  Fatty liver disease, nonalcoholic  Renal stones  Hypertension  Neuropathy  Hypercholesteremia  Diabetes  S/P cholecystectomy  No significant past surgical history      Family history:  Family history of type 2 diabetes mellitus (Mother)  Family history of hypertension (Mother)  Family history of stomach cancer (Mother)  No pertinent family history in first degree relatives      There is no family history of peptic ulcer disease, gastric cancer, colon polyps, colon cancer, celiac disease, biliary, hepatic, or pancreatic disease.  None of the female relatives have breast, uterine, or ovarian cancer.     Social History:     ROS:     General:  No wt loss, fevers, chills, night sweats, fatigue,   Eyes:  Good vision, no reported pain  ENT:  No sore throat, pain, runny nose, dysphagia  CV:  No pain, palpitations, hypo/hypertension  Resp:  No dyspnea, cough, tachypnea, wheezing  GI:  See HPI, poor appetite for 5 days   :  +Frequency and hesitancy, +suprapubic pain, +dysuria.  Muscle:  No pain, weakness  Neuro:  No weakness, tingling, memory problems  Psych:  No fatigue, insomnia, mood problems, depression  Endocrine:  No polyuria, polydipsia, cold/heat intolerance  Heme:  No petechiae, ecchymosis, easy bruisability  Skin:  No rash, tattoos, scars, edema      PHYSICAL EXAM:   GENERAL:  Appears stated age, well-groomed  HEENT:  NC/AT,  conjunctivae clear and pink, no thyromegaly, +sclera icteric  CHEST:  Full & symmetric excursion, no increased effort, breath sounds clear  HEART:  Regular rhythm, S1, S2, no murmur/rub/S3/S4  ABDOMEN:  Soft, non-tender, +central adiposity, non-distended, normoactive bowel sounds  EXTEREMITIES:  no cyanosis,clubbing or edema  SKIN:  No rash/erythema/ecchymoses, +jaundice  NEURO:  Alert, oriented x4, +faint asterixis    Vital Signs:  Vital Signs Last 24 Hrs  T(C): 36.5 (10 Mar 2020 09:56), Max: 36.5 (10 Mar 2020 09:56)  T(F): 97.7 (10 Mar 2020 09:56), Max: 97.7 (10 Mar 2020 09:56)  HR: 92 (10 Mar 2020 09:56) (92 - 92)  BP: 98/60 (10 Mar 2020 09:56) (98/60 - 98/60)  BP(mean): --  RR: 18 (10 Mar 2020 09:56) (18 - 18)  SpO2: 100% (10 Mar 2020 09:56) (100% - 100%)  Daily Height in cm: 185.42 (10 Mar 2020 09:56)    Daily     LABS:    03-10    132<L>  |  94<L>  |  29<H>  ----------------------------<  209<H>  4.0   |  23  |  1.15    Ca    10.7<H>      10 Mar 2020 12:06    TPro  7.1  /  Alb  3.0<L>  /  TBili  14.1<H>  /  DBili  x   /  AST  28  /  ALT  20  /  AlkPhos  154<H>  03-10    CBC Full  -  ( 10 Mar 2020 12:06 )  WBC Count : 15.75 K/uL  RBC Count : 2.57 M/uL  Hemoglobin : 9.7 g/dL  Hematocrit : 28.0 %  Platelet Count - Automated : 111 K/uL  Mean Cell Volume : 108.9 fl  Mean Cell Hemoglobin : 37.7 pg  Mean Cell Hemoglobin Concentration : 34.6 gm/dL  Auto Neutrophil # : 14.52 K/uL  Auto Lymphocyte # : 0.27 K/uL  Auto Monocyte # : 0.82 K/uL  Auto Eosinophil # : 0.00 K/uL  Auto Basophil # : 0.14 K/uL  Auto Neutrophil % : 91.3 %  Auto Lymphocyte % : 1.7 %  Auto Monocyte % : 5.2 %  Auto Eosinophil % : 0.0 %  Auto Basophil % : 0.9 %    PT/INR - ( 10 Mar 2020 12:06 )   PT: 22.4 sec;   INR: 1.91 ratio         PTT - ( 10 Mar 2020 12:06 )  PTT:34.2 sec        Imaging:

## 2020-03-10 NOTE — ED ADULT NURSE REASSESSMENT NOTE - NS ED NURSE REASSESS COMMENT FT1
Received pt from JODY George. Pt alert and oriented, VSS. IV site infusing with abx. Will continue to monitor

## 2020-03-10 NOTE — H&P ADULT - PROBLEM SELECTOR PLAN 2
Falls associated with general weakness with UTI symptoms   most likely from infection/ Vasovagal   close monitoring   s/p IVF in the ED   will treat infection with Ertapenem Falls associated with general weakness with UTI symptoms   most likely from infection/ Vasovagal   close monitoring   s/p IVF in the ED   will treat infection with Ertapenem  HOLD diuretics for now

## 2020-03-10 NOTE — H&P ADULT - PROBLEM SELECTOR PLAN 7
BP is stable currently   in setting of sepsis and weakness / will hold diuretics h/O Orthostatic hypotension and Midrodrine was increased during last last admission to 30 mg oral THree times a day   WIll cont Midodrine 30 mg oral 3times /day   Will get Orthostatic BP once able   will repeat serum cortisol as last cortisol in 12/3/19 was 6.6   Support stocking / will cont DASH diet for now

## 2020-03-10 NOTE — ED PROVIDER NOTE - CARE PLAN
Principal Discharge DX:	Weakness  Secondary Diagnosis:	Syncope and collapse  Secondary Diagnosis:	Frequent falls  Secondary Diagnosis:	UTI (urinary tract infection)

## 2020-03-10 NOTE — H&P ADULT - PROBLEM SELECTOR PROBLEM 6
Essential hypertension Cirrhosis of liver without ascites, unspecified hepatic cirrhosis type Macrocytic anemia

## 2020-03-10 NOTE — H&P ADULT - ASSESSMENT
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 syncope.  Patient is found to have leucocytosis and abnormal urinalysis.

## 2020-03-10 NOTE — H&P ADULT - PROBLEM SELECTOR PLAN 4
General with urinary symptoms with leucocytosis and abnormal urinalysis   cont Ertapenem   F/up CX   PT eval/ Pt agrees  Will get ORthostatic BP

## 2020-03-11 DIAGNOSIS — A41.9 SEPSIS, UNSPECIFIED ORGANISM: ICD-10-CM

## 2020-03-11 DIAGNOSIS — N39.0 URINARY TRACT INFECTION, SITE NOT SPECIFIED: ICD-10-CM

## 2020-03-11 LAB
ALBUMIN SERPL ELPH-MCNC: 2.7 G/DL — LOW (ref 3.3–5)
ALP SERPL-CCNC: 117 U/L — SIGNIFICANT CHANGE UP (ref 40–120)
ALT FLD-CCNC: 17 U/L — SIGNIFICANT CHANGE UP (ref 10–45)
AMMONIA BLD-MCNC: 45 UMOL/L — SIGNIFICANT CHANGE UP (ref 11–55)
ANION GAP SERPL CALC-SCNC: 11 MMOL/L — SIGNIFICANT CHANGE UP (ref 5–17)
AST SERPL-CCNC: 23 U/L — SIGNIFICANT CHANGE UP (ref 10–40)
BASOPHILS # BLD AUTO: 0.06 K/UL — SIGNIFICANT CHANGE UP (ref 0–0.2)
BASOPHILS NFR BLD AUTO: 0.5 % — SIGNIFICANT CHANGE UP (ref 0–2)
BILIRUB SERPL-MCNC: 12.3 MG/DL — HIGH (ref 0.2–1.2)
BUN SERPL-MCNC: 27 MG/DL — HIGH (ref 7–23)
CALCIUM SERPL-MCNC: 10 MG/DL — SIGNIFICANT CHANGE UP (ref 8.4–10.5)
CHLORIDE SERPL-SCNC: 94 MMOL/L — LOW (ref 96–108)
CO2 SERPL-SCNC: 27 MMOL/L — SIGNIFICANT CHANGE UP (ref 22–31)
CREAT SERPL-MCNC: 0.99 MG/DL — SIGNIFICANT CHANGE UP (ref 0.5–1.3)
EOSINOPHIL # BLD AUTO: 0.13 K/UL — SIGNIFICANT CHANGE UP (ref 0–0.5)
EOSINOPHIL NFR BLD AUTO: 1.1 % — SIGNIFICANT CHANGE UP (ref 0–6)
GLUCOSE BLDC GLUCOMTR-MCNC: 191 MG/DL — HIGH (ref 70–99)
GLUCOSE BLDC GLUCOMTR-MCNC: 252 MG/DL — HIGH (ref 70–99)
GLUCOSE BLDC GLUCOMTR-MCNC: 254 MG/DL — HIGH (ref 70–99)
GLUCOSE SERPL-MCNC: 212 MG/DL — HIGH (ref 70–99)
HCT VFR BLD CALC: 23.6 % — LOW (ref 39–50)
HGB BLD-MCNC: 8 G/DL — LOW (ref 13–17)
IMM GRANULOCYTES NFR BLD AUTO: 1.1 % — SIGNIFICANT CHANGE UP (ref 0–1.5)
LACTATE SERPL-SCNC: 2.6 MMOL/L — HIGH (ref 0.7–2)
LACTATE SERPL-SCNC: 2.9 MMOL/L — HIGH (ref 0.7–2)
LACTATE SERPL-SCNC: 3.1 MMOL/L — HIGH (ref 0.7–2)
LYMPHOCYTES # BLD AUTO: 1.36 K/UL — SIGNIFICANT CHANGE UP (ref 1–3.3)
LYMPHOCYTES # BLD AUTO: 11.6 % — LOW (ref 13–44)
MAGNESIUM SERPL-MCNC: 1.7 MG/DL — SIGNIFICANT CHANGE UP (ref 1.6–2.6)
MCHC RBC-ENTMCNC: 33.9 GM/DL — SIGNIFICANT CHANGE UP (ref 32–36)
MCHC RBC-ENTMCNC: 36.7 PG — HIGH (ref 27–34)
MCV RBC AUTO: 108.3 FL — HIGH (ref 80–100)
MONOCYTES # BLD AUTO: 0.98 K/UL — HIGH (ref 0–0.9)
MONOCYTES NFR BLD AUTO: 8.3 % — SIGNIFICANT CHANGE UP (ref 2–14)
NEUTROPHILS # BLD AUTO: 9.1 K/UL — HIGH (ref 1.8–7.4)
NEUTROPHILS NFR BLD AUTO: 77.4 % — HIGH (ref 43–77)
NRBC # BLD: 0 /100 WBCS — SIGNIFICANT CHANGE UP (ref 0–0)
PHOSPHATE SERPL-MCNC: 2.5 MG/DL — SIGNIFICANT CHANGE UP (ref 2.5–4.5)
PLATELET # BLD AUTO: 84 K/UL — LOW (ref 150–400)
POTASSIUM SERPL-MCNC: 3.7 MMOL/L — SIGNIFICANT CHANGE UP (ref 3.5–5.3)
POTASSIUM SERPL-SCNC: 3.7 MMOL/L — SIGNIFICANT CHANGE UP (ref 3.5–5.3)
PROT SERPL-MCNC: 6.1 G/DL — SIGNIFICANT CHANGE UP (ref 6–8.3)
RBC # BLD: 2.18 M/UL — LOW (ref 4.2–5.8)
RBC # FLD: 15.1 % — HIGH (ref 10.3–14.5)
SODIUM SERPL-SCNC: 132 MMOL/L — LOW (ref 135–145)
TSH SERPL-MCNC: 0.97 UIU/ML — SIGNIFICANT CHANGE UP (ref 0.27–4.2)
WBC # BLD: 11.76 K/UL — HIGH (ref 3.8–10.5)
WBC # FLD AUTO: 11.76 K/UL — HIGH (ref 3.8–10.5)

## 2020-03-11 PROCEDURE — 99222 1ST HOSP IP/OBS MODERATE 55: CPT

## 2020-03-11 PROCEDURE — 99233 SBSQ HOSP IP/OBS HIGH 50: CPT

## 2020-03-11 PROCEDURE — 74176 CT ABD & PELVIS W/O CONTRAST: CPT | Mod: 26

## 2020-03-11 PROCEDURE — 99232 SBSQ HOSP IP/OBS MODERATE 35: CPT | Mod: GC

## 2020-03-11 RX ORDER — ALBUMIN HUMAN 25 %
100 VIAL (ML) INTRAVENOUS EVERY 8 HOURS
Refills: 0 | Status: DISCONTINUED | OUTPATIENT
Start: 2020-03-11 | End: 2020-03-15

## 2020-03-11 RX ORDER — MAGNESIUM SULFATE 500 MG/ML
1 VIAL (ML) INJECTION ONCE
Refills: 0 | Status: COMPLETED | OUTPATIENT
Start: 2020-03-11 | End: 2020-03-11

## 2020-03-11 RX ORDER — CHLORHEXIDINE GLUCONATE 213 G/1000ML
1 SOLUTION TOPICAL DAILY
Refills: 0 | Status: DISCONTINUED | OUTPATIENT
Start: 2020-03-11 | End: 2020-03-15

## 2020-03-11 RX ORDER — MEROPENEM 1 G/30ML
1000 INJECTION INTRAVENOUS EVERY 8 HOURS
Refills: 0 | Status: DISCONTINUED | OUTPATIENT
Start: 2020-03-11 | End: 2020-03-15

## 2020-03-11 RX ADMIN — ERTAPENEM SODIUM 120 MILLIGRAM(S): 1 INJECTION, POWDER, LYOPHILIZED, FOR SOLUTION INTRAMUSCULAR; INTRAVENOUS at 15:35

## 2020-03-11 RX ADMIN — TAMSULOSIN HYDROCHLORIDE 0.4 MILLIGRAM(S): 0.4 CAPSULE ORAL at 22:29

## 2020-03-11 RX ADMIN — MEROPENEM 100 MILLIGRAM(S): 1 INJECTION INTRAVENOUS at 22:29

## 2020-03-11 RX ADMIN — Medication 6 UNIT(S): at 09:01

## 2020-03-11 RX ADMIN — INSULIN GLARGINE 12 UNIT(S): 100 INJECTION, SOLUTION SUBCUTANEOUS at 22:39

## 2020-03-11 RX ADMIN — Medication 1 MILLIGRAM(S): at 13:26

## 2020-03-11 RX ADMIN — Medication 6 UNIT(S): at 13:26

## 2020-03-11 RX ADMIN — MIDODRINE HYDROCHLORIDE 30 MILLIGRAM(S): 2.5 TABLET ORAL at 05:46

## 2020-03-11 RX ADMIN — LACTULOSE 20 GRAM(S): 10 SOLUTION ORAL at 05:45

## 2020-03-11 RX ADMIN — Medication 6: at 18:15

## 2020-03-11 RX ADMIN — Medication 100 GRAM(S): at 14:01

## 2020-03-11 RX ADMIN — LACTULOSE 20 GRAM(S): 10 SOLUTION ORAL at 13:26

## 2020-03-11 RX ADMIN — MIDODRINE HYDROCHLORIDE 30 MILLIGRAM(S): 2.5 TABLET ORAL at 13:26

## 2020-03-11 RX ADMIN — MIDODRINE HYDROCHLORIDE 30 MILLIGRAM(S): 2.5 TABLET ORAL at 22:29

## 2020-03-11 RX ADMIN — Medication 6 UNIT(S): at 18:14

## 2020-03-11 RX ADMIN — Medication 6: at 13:26

## 2020-03-11 RX ADMIN — Medication 50 MILLILITER(S): at 22:47

## 2020-03-11 RX ADMIN — Medication 2: at 09:00

## 2020-03-11 RX ADMIN — LACTULOSE 20 GRAM(S): 10 SOLUTION ORAL at 22:29

## 2020-03-11 RX ADMIN — PANTOPRAZOLE SODIUM 40 MILLIGRAM(S): 20 TABLET, DELAYED RELEASE ORAL at 05:46

## 2020-03-11 RX ADMIN — Medication 50 MILLILITER(S): at 14:05

## 2020-03-11 NOTE — CONSULT NOTE ADULT - SUBJECTIVE AND OBJECTIVE BOX
CHIEF COMPLAINT: Patient is a 64y old  Male who presents with a chief complaint of Syncopal episode at home at 0430 this am, LOC for approx 3 minutes per wife, pt had episode of shaking during that time. Hx of multiple falls. Pt reporting abdominal pain for X 1day, no nausea or vomiting, endorses loose stool on lactulose at home (10 Mar 2020 15:41)      HPI: Pt is poor historian and most of info taken from chart.   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 syncope.  Patient states that he was doing ok after dc from hospital and about Saturday 3/7, he started feeling generally weak and had diarrhea where he was moving his BM every 15 mns and his wife told him to hold the Lactulose which he did.  He was was having the constant urge to urinate .  However, yesterday , the Diarrhea stopped but he could not urinate.  Pt denies any nausea, vomiting, abd pain , no cough, no fever or chills or chest pain .  NO travel of sick contact.  NO visitors to his house ( of note, patient's youngest child in high school was told to stay at home for 14 days from school , not because of known exposure as per father .  It is because he did not go to school because he had some knee pain for one day as per father.    ED   :   Vitals are stable Pt received NS 500ml and one dose of Ertapenem due to abnormal urinalysis, blood and UCX were sent    Previous admissions :  20 - 3/03/20 for falls where SANTANA  was recommended but pt declined and was sent home .  Pt was on Lasix 40 mg daily alternating with 20mg which was changed to Lasix 20 mg oral daily and Midodrine increased from 20 mg oral THree times daily to 30 mg three times a day.      EKG: SR prolonged     REVIEW OF SYSTEMS:   All other review of systems are negative unless indicated above    PAST MEDICAL & SURGICAL HISTORY:  GIB (gastrointestinal bleeding)  GERD with esophagitis: Gastritis &amp; Non Bleeding Ulcers  Hepatic encephalopathy  Obesity  Fatty liver disease, nonalcoholic  Renal stones: 25 years ago  Hypertension  Neuropathy  Hypercholesteremia  Diabetes  S/P cholecystectomy      SOCIAL HISTORY:  No current tobacco, ethanol, or drug abuse.    FAMILY HISTORY:  Family history of type 2 diabetes mellitus  Family history of hypertension  Family history of stomach cancer    No family history of acute MI or sudden cardiac death.    MEDICATIONS  (STANDING):  dextrose 5%. 1000 milliLiter(s) (50 mL/Hr) IV Continuous <Continuous>  dextrose 50% Injectable 12.5 Gram(s) IV Push once  dextrose 50% Injectable 25 Gram(s) IV Push once  ertapenem  IVPB 1000 milliGRAM(s) IV Intermittent every 24 hours  folic acid 1 milliGRAM(s) Oral daily  insulin glargine Injectable (LANTUS) 12 Unit(s) SubCutaneous at bedtime  insulin lispro (HumaLOG) corrective regimen sliding scale   SubCutaneous three times a day before meals  insulin lispro Injectable (HumaLOG) 6 Unit(s) SubCutaneous three times a day before meals  lactulose Syrup 20 Gram(s) Oral three times a day  midodrine. 30 milliGRAM(s) Oral three times a day  pantoprazole    Tablet 40 milliGRAM(s) Oral before breakfast  rifAXIMin 550 milliGRAM(s) Oral two times a day  tamsulosin 0.4 milliGRAM(s) Oral at bedtime    MEDICATIONS  (PRN):  dextrose 40% Gel 15 Gram(s) Oral once PRN Blood Glucose LESS THAN 70 milliGRAM(s)/deciliter  glucagon  Injectable 1 milliGRAM(s) IntraMuscular once PRN Glucose LESS THAN 70 milligrams/deciliter  ondansetron Injectable 4 milliGRAM(s) IV Push every 6 hours PRN Nausea      Allergies    codeine (Anaphylaxis)    Intolerances        Home meds:  Home Medications:  folic acid 1 mg oral tablet: 1 tab(s) orally once a day (10 Mar 2020 18:15)  furosemide 20 mg oral tablet: 1 tab(s) orally once a day (10 Mar 2020 18:15)  insulin glargine: 12 unit(s) subcutaneous once a day (at bedtime) (10 Mar 2020 18:14)  insulin lispro: 6 unit(s) subcutaneous 3 times a day (before meals) (10 Mar 2020 18:14)  lactulose 10 g/15 mL oral syrup: 15 milliliter(s) orally 3 times a day (10 Mar 2020 18:15)  midodrine 10 mg oral tablet: 3 tab(s) orally 3 times a day (10 Mar 2020 18:15)  pantoprazole 40 mg oral delayed release tablet: 1 tab(s) orally once a day (before a meal) (10 Mar 2020 18:14)  rifAXIMin 550 mg oral tablet: 1 tab(s) orally 2 times a day (10 Mar 2020 18:14)  spironolactone 25 mg oral tablet: 2 tab(s) orally once a day (10 Mar 2020 18:15)  tamsulosin 0.4 mg oral capsule: 1 cap(s) orally once a day (at bedtime) (10 Mar 2020 18:15)        VITAL SIGNS:   Vital Signs Last 24 Hrs  T(C): 37 (11 Mar 2020 04:05), Max: 37.1 (11 Mar 2020 00:03)  T(F): 98.6 (11 Mar 2020 04:05), Max: 98.8 (11 Mar 2020 00:03)  HR: 85 (11 Mar 2020 09:00) (77 - 92)  BP: 95/58 (11 Mar 2020 09:00) (95/58 - 148/68)  BP(mean): --  RR: 18 (11 Mar 2020 09:00) (18 - 22)  SpO2: 98% (11 Mar 2020 09:00) (98% - 100%)    I&O's Summary    10 Mar 2020 07:01  -  11 Mar 2020 07:00  --------------------------------------------------------  IN: 540 mL / OUT: 1650 mL / NET: -1110 mL        On Exam:    Constitutional: NAD, awake    HEENT: Dry Mucous Membranes, icteric  Pulmonary: Decreased breath sounds b/l. No rales, crackles or wheeze appreciated.   Cardiovascular: Regular, S1 and S2, No murmurs, rubs, gallops or clicks  Gastrointestinal: Bowel Sounds present, soft, nontender.   Lymph: No peripheral edema. No lymphadenopathy.  Skin: No visible rashes or ulcers. + jaundice  Psych:  Mood & affect appropriate    LABS: All Labs Reviewed:                        8.0    11.76 )-----------( 84       ( 11 Mar 2020 06:36 )             23.6                         9.7    15.75 )-----------( 111      ( 10 Mar 2020 12:06 )             28.0     11 Mar 2020 06:33    132    |  94     |  27     ----------------------------<  212    3.7     |  27     |  0.99   10 Mar 2020 12:06    132    |  94     |  29     ----------------------------<  209    4.0     |  23     |  1.15     Ca    10.0       11 Mar 2020 06:33  Ca    10.7       10 Mar 2020 12:06  Phos  2.5       11 Mar 2020 06:33  Mg     1.7       11 Mar 2020 06:33    TPro  6.1    /  Alb  2.7    /  TBili  12.3   /  DBili  x      /  AST  23     /  ALT  17     /  AlkPhos  117    11 Mar 2020 06:33  TPro  7.1    /  Alb  3.0    /  TBili  14.1   /  DBili  x      /  AST  28     /  ALT  20     /  AlkPhos  154    10 Mar 2020 12:06    PT/INR - ( 10 Mar 2020 12:06 )   PT: 22.4 sec;   INR: 1.91 ratio         PTT - ( 10 Mar 2020 12:06 )  PTT:34.2 sec  CARDIAC MARKERS ( 10 Mar 2020 12:06 )  x     / x     / 31 U/L / x     / x          Blood Culture:         RADIOLOGY:    < from: Xray Chest 1 View- PORTABLE-Urgent (03.10.20 @ 23:12) >    ******PRELIMINARY REPORT******    ******PRELIMINARY REPORT******          EXAM:  XR CHEST PORTABLE URGENT 1V                            PROCEDURE DATE:  03/10/2020      ******PRELIMINARY REPORT******    ******PRELIMINARY REPORT******              INTERPRETATION:  No emergent or urgent findings.    Follow up official report.              ******PRELIMINARY REPORT******    ******PRELIMINARY REPORT******          CHAUNCEY OSBORN M.D., RADIOLOGY RESIDENT                      < end of copied text >    < from: TTE with Doppler (w/Cont) (20 @ 14:36) >    Patient name: JESSICA MARTIN  YOB: 1955   Age: 64 (M)   MR#: [id number]  Study Date: 2020  [facility]  Study quality: Technically difficult  Referring Physician: Cary Rivera MD  Blood Pressure: 122/64 mmHg  Height: 185 cm  Weight: 111 kg  BSA: 2.3 m2  ------------------------------------------------------------------------  PROCEDURE: Transthoracic echocardiogram with 2-D, M-Mode  and complete spectral and color flow Doppler. Verbal  consent was obtained for injection of  Ultrasonic Enhancing  Agent following a discussion of risks and benefits.  Following intravenous injection of Ultrasonic Enhancing  Agent , harmonic imaging was performed.  INDICATION: Pericardial effusion (noninflammatory) (I31.3)  ------------------------------------------------------------------------  Dimensions:    Normal Values:  LA:     4.2    2.0 - 4.0 cm  Ao:     3.7    2.0 - 3.8 cm  SEPTUM: 0.8    0.6 - 1.2 cm  PWT:    0.8    0.6 - 1.1 cm  LVIDd:  5.5    3.0 - 5.6 cm  LVIDs:  2.8    1.8 - 4.0 cm  Derived variables:  LVMI: 68 g/m2  RWT: 0.29  Fractional short: 49 %  EF (Visual Estimate): 70 %  Doppler Peak Velocity (m/sec): AoV=1.9  ------------------------------------------------------------------------  Observations:  Mitral Valve: Normal mitral valve. Mitral annular  calcification.  Aortic Valve/Aorta: Calcified aortic valve with normal  opening.  Normal aortic root size.  Left Atrium: Mildly dilated left atrium.  Left Ventricle: Endocardial visualization enhanced with  intravenous injection of Ultrasonic Enhancing Agent  (Definity).  Normal left ventricular internal dimensions and wall  thicknesses.  Normal left ventricular systolic function. No segmental  wall motion abnormalities.  Normal diastolic function.  Right Heart: Right atrium not well visualized. The right  ventricle is not well visualized. Normal tricuspid valve.  Normal pulmonic valve.  Pericardium/Pleura: Pericardial fat pad noted. No  pericardial effusion.  Hemodynamic: No evidence of pulmonary hypertension.  ------------------------------------------------------------------------  Conclusions:  Endocardial visualization enhanced with intravenous  injection of Ultrasonic Enhancing Agent (Definity).  Normal left ventricular systolic function. No segmental  wall motion abnormalities.  ------------------------------------------------------------------------  Confirmed on  2020 - 17:07:16 by Earl Plummer MD, FASE  ------------------------------------------------------------------------    < end of copied text >       < from: Cardiac Cath Lab - Adult (19 @ 17:22) >    [facility]  Department of Cardiology  [address] [place] [address]  [phone]  Cath Lab Report -- Comprehensive Report  Patient: JESSICA MARTIN  Study date: 2019  Account number: [id number]  MR number: [id number]  : 1955  Gender: Male  Race: W  Case Physician(s):  Swetha Kc M.D.  Fellow:  Sanjeev Mckinney M.D.  Referring Physician:  Surendra Knowles M.D.  INDICATIONS: Pre-liver transplant assessment.  HISTORY: There was no prior cardiac history. The patient has hypertension,  renal failure (not requiring dialysis), and immunosuppression. PRIOR  CARDIOVASCULAR PROCEDURES: None.  PROCEDURE:  --  Right heart catheterization.  --  Left heart catheterization.  --  Left coronary angiography.  --  Rightcoronary angiography.  --  Sonosite - Diagnostic.  --  Hemostasis with Angioseal.  TECHNIQUE: The risks and alternatives of the procedures and conscious  sedation were explained to the patient and informed consent was obtained.  Cardiac catheterization performed electively.  Local anesthetic given. Right femoral artery access. A 5FR SHEATH PINNACLE  was inserted in the vessel. Right femoral vein access. A 7FR SHEATH  PINNACLE was inserted in the vessel. Right heart catheterization. The  procedurewas performed utilizing a 7FR SWAN TISHA catheter under  fluoroscopic guidance. Measurements of pressures were obtained. Left heart  catheterization. A 5FR FR4.0 EXPO catheter was utilized. After recording  ascending aortic pressure, the catheter was advanced across the aortic  valve and left ventricular pressure was recorded. Left coronary artery  angiography. The vessel was injected utilizing a 5FR FL4.0 EXPO catheter.  Right coronary artery angiography. The vessel was injected utilizing a 5FR  FR4.0 EXPO catheter. Sonosite - Diagnostic. Hemostasis with Angioseal.  RADIATION EXPOSURE: 10.8 min.  CONTRAST GIVEN: Omnipaque 34 ml.  VENTRICLES: No left ventriculogram was performed.  CORONARY VESSELS: The coronary circulation is right dominant.  LM:   --  Distal left main: There was a 20 % stenosis.  LAD:   --  Mid LAD: There was a tubular 30 % stenosis.  CX:   --  Circumflex: Angiography showed minor luminal irregularities with  no flow limiting lesions.  --  OM1: Angiography showed minor luminal irregularities with no flow  limiting lesions.  RI:   --  Ramus intermedius: Angiography showed minor luminal  irregularities with no flow limiting lesions.  RCA:   --  Mid RCA: There was a 30 % stenosis.  --  RPLS: There was a 20 % stenosis.  COMPLICATIONS: There were no complications.  DIAGNOSTIC IMPRESSIONS: Mild CAD. Normal PA pressure. Unable to obtain  portal pressure (pt encephalopathic and did not cooperate by the  conclusion of the procedure)  DIAGNOSTIC RECOMMENDATIONS: Med management.  Prepared and signed by  Swetha Kc M.D.  Signed 2019 15:13:27  HEMODYNAMIC TABLES  Pressures:  Baseline  Pressures:  - HR: 86  Pressures:  - Rhythm:  Pressures:  -- Aortic Pressure (S/D/M): 112/54/67  Pressures:  -- Left Ventricle (s/edp): 116/3/--  Pressures:  -- Pulmonary Artery (S/D/M): 25/7/14  Pressures:  -- Pulmonary Capillary Wedge: 8/7/5  Pressures:  -- Right Atrium (a/v/M): 3/3/1  Pressures:  -- Right Ventricle (s/edp): 30/3/--  O2 Sats:  Baseline  O2 Sats:  - HR: 86  O2 Sats:  - Rhythm:  O2 Sats:  -- AO: 9.1/98/12.13  O2 Sats:  -- PA: 8.9/73.2/8.86  Outputs:  Baseline  Outputs:  -- CALCULATIONS[age]  Outputs:  -- CALCULATIONS: Body Surface Area: 2.38  Outputs:  -- CALCULATIONS: Height in cm: 185.00  Outputs:  -- CALCULATIONS: Sex: Male  Outputs:  -- CALCULATIONS: Weight in k.20  Outputs:  -- OUTPUTS: CO by Guille: 9.76  Outputs:  -- OUTPUTS: Guille cardiac index: 4.10  Outputs:  -- OUTPUTS: O2 consumption: 319.00  Outputs:  -- OUTPUTS: Vo2 Indexed: 134.16  Outputs:  -- RESISTANCES: Left ventricular stroke work: 96.82  Outputs:  -- RESISTANCES: Left Ventricular Stroke Work index: 40.72  Outputs:  -- RESISTANCES: Pulmonary vascular index (dsc): 175.37  Outputs:  -- RESISTANCES: Pulmonary vascular index (Wood Units): 2.19  Outputs:  -- RESISTANCES: Pulmonary vascular resistance (dsc): 73.75  Outputs:  -- RESISTANCES: Pulmonary vascular resistance (Wood Units): 0.92  Outputs:  -- RESISTANCES: PVR_SVR Ratio: 0.14  Outputs:  -- RESISTANCES: Right ventricular stroke work: 20.07  Outputs:  -- RESISTANCES: Right ventricular stroke work index: 8.44  Outputs:  -- RESISTANCES: Systemic vascular index (dsc): 1286.02  Outputs:  -- RESISTANCES: Systemic vascular index (Wood Units): 16.08  Outputs:  -- RESISTANCES: Systemic vascular resistance (dsc): 540.83  Outputs:  -- RESISTANCES: Systemic vascular resistance (Wood Units): 6.76  Outputs:  -- RESISTANCES: Total pulmonary index (dsc): 272.79  Outputs:  -- RESISTANCES: Total pulmonary index (WoodUnits): 3.41  Outputs:  -- RESISTANCES: Total pulmonary resistance (dsc): 114.72  Outputs:  -- RESISTANCES: Total pulmonary resistance (Wood Units): 1.43  Outputs:  -- RESISTANCES: Total vascular index (Wood Units): 16.32  Outputs:  -- RESISTANCES: Total vascular resistance (dsc): 549.03  Outputs:  -- RESISTANCES: Total vascular resistance (Wood Units): 6.86  Outputs:  -- RESISTANCES: Total vascular resistance index (dsc): 1305.50  Outputs:  -- RESISTANCES: TPR_TVR Ratio: 0.21  Outputs:  -- SHUNTS: Pulmonary flow: 9.76  Outputs:  -- SHUNTS: Qp Indexed: 4.10  Outputs:  -- SHUNTS: Qs Indexed: 4.10  Outputs:  -- SHUNTS: Systemic flow: 9.76    < end of copied text >

## 2020-03-11 NOTE — PROGRESS NOTE ADULT - ATTENDING COMMENTS
63 yo M with IDDM, dyslipidemia, obesity, GERD, HFpEF with mild LV diastolic dysfunction, and decompensated JEAN cirrhosis complicated by ascites, history of SBP, hepatic encephalopathy, and chronic anemia with a history of duodenal ulcer as well as GAVE and duodenal AVM s/p APC (last on 10/11/19), who is UNOS listed for liver transplantation at [facility]. He has had multiple recent hospitalizations, including hospitalization from 2/11/20-2/21/20 due to septic shock secondary to emphysematous cystitis and ESBL E. coli bacteremia and from 2/28/20-3/3/20 after a mechanical fall. He is currently re-admitted after another mechanical fall likely preceded by vasovagal syncope, with evidence of urosepsis as well as mild hepatic encephalopathy and mild MISA.    # Urosepsis: CT scan reviewed with Radiology, and has marked interval increased size of prostate with heterogeneity, concerning for prostatic abscess, also without clear fat plane  bladder and rectum, with concern for possible colovesicular fistula, especially given feculent urine output. Transplant ID input appreciated, and will plan for MRI pelvis for further evaluation with subsequent Urology consultation. Awaiting results of urine and blood cultures, and switched from ertapenem (3/10-11) to meropenem (3/11- ) as per Transplant ID recommendations.    # Hepatic encephalopathy: AOx4 but still with faint asterixis on exam and mild cognitive slowness compared to his normal baseline. Continue rifaximin and lactulose and will adjust dosing based on clinical response.    # Mild, non-oliguric MISA: Improved today after indwelling urinary catheter insertion yesterday for retention and after receiving  mL iv bolus x1 yesterday and albumin 25% 100 mL iv x1 yesterday. Continue to hold diuretics for now. Monitor I/Os.    # Orthostatic hypotension: Chronic, related to end-stage liver disease. Continue home midodrine 30 mg po tid.    # Hyponatremia: Currently still appears mildly hypovolemic. Recommend to check orthostatic vital signs and provide additional IV albumin fluid resuscitation as needed. Continue to hold diuretics and defer fluid restriction for now.    # Chronic anemia secondary to chronic GI blood loss: No recent overt bleeding. Hb/HCT stable compared to his baseline.    # Decompensated JEAN cirrhosis: He is currently UNOS wait-listed for liver transplantation at [facility], blood type A, with MELD-Na 27 (3/10/20), currently with calculated MELD-Na of 26 based on labs today. However, will need resolution of active infection prior to proceeding with transplantation and this may require prolonged antibiotics and/or surgical intervention pending further imaging/investigations.    Please don't hesitate to call with any questions/concerns.    Letitia Mo M.D., Ph.D.  Transplant Hepatology  Cell: [phone]

## 2020-03-11 NOTE — PROGRESS NOTE ADULT - SUBJECTIVE AND OBJECTIVE BOX
PROGRESS NOTE:     Patient is a 64y old  Male who presents with a chief complaint of Syncopal episode at home at 0430 this am, LOC for approx 3 minutes per wife, pt had episode of shaking during that time. Hx of multiple falls. Pt reporting abdominal pain for X 1day, no nausea or vomiting, endorses loose stool on lactulose at home (11 Mar 2020 13:15)      SUBJECTIVE / OVERNIGHT EVENTS: MARS    ADDITIONAL REVIEW OF SYSTEMS:  no fevers or chills  NO n/v/d    MEDICATIONS  (STANDING):  albumin human 25% IVPB 100 milliLiter(s) IV Intermittent every 8 hours  dextrose 5%. 1000 milliLiter(s) (50 mL/Hr) IV Continuous <Continuous>  dextrose 50% Injectable 12.5 Gram(s) IV Push once  dextrose 50% Injectable 25 Gram(s) IV Push once  ertapenem  IVPB 1000 milliGRAM(s) IV Intermittent every 24 hours  folic acid 1 milliGRAM(s) Oral daily  insulin glargine Injectable (LANTUS) 12 Unit(s) SubCutaneous at bedtime  insulin lispro (HumaLOG) corrective regimen sliding scale   SubCutaneous three times a day before meals  insulin lispro Injectable (HumaLOG) 6 Unit(s) SubCutaneous three times a day before meals  lactulose Syrup 20 Gram(s) Oral three times a day  magnesium sulfate  IVPB 1 Gram(s) IV Intermittent once  midodrine. 30 milliGRAM(s) Oral three times a day  pantoprazole    Tablet 40 milliGRAM(s) Oral before breakfast  rifAXIMin 550 milliGRAM(s) Oral two times a day  tamsulosin 0.4 milliGRAM(s) Oral at bedtime    MEDICATIONS  (PRN):  dextrose 40% Gel 15 Gram(s) Oral once PRN Blood Glucose LESS THAN 70 milliGRAM(s)/deciliter  glucagon  Injectable 1 milliGRAM(s) IntraMuscular once PRN Glucose LESS THAN 70 milligrams/deciliter  ondansetron Injectable 4 milliGRAM(s) IV Push every 6 hours PRN Nausea      CAPILLARY BLOOD GLUCOSE      POCT Blood Glucose.: 254 mg/dL (11 Mar 2020 13:22)  POCT Blood Glucose.: 252 mg/dL (11 Mar 2020 12:14)  POCT Blood Glucose.: 191 mg/dL (11 Mar 2020 08:53)  POCT Blood Glucose.: 227 mg/dL (10 Mar 2020 21:38)  POCT Blood Glucose.: 232 mg/dL (10 Mar 2020 18:07)    I&O's Summary    10 Mar 2020 07:01  -  11 Mar 2020 07:00  --------------------------------------------------------  IN: 540 mL / OUT: 1650 mL / NET: -1110 mL    11 Mar 2020 07:01  -  11 Mar 2020 13:47  --------------------------------------------------------  IN: 240 mL / OUT: 0 mL / NET: 240 mL        PHYSICAL EXAM:  Vital Signs Last 24 Hrs  T(C): 37 (11 Mar 2020 04:05), Max: 37.1 (11 Mar 2020 00:03)  T(F): 98.6 (11 Mar 2020 04:05), Max: 98.8 (11 Mar 2020 00:03)  HR: 85 (11 Mar 2020 09:00) (77 - 91)  BP: 95/58 (11 Mar 2020 09:00) (95/58 - 148/68)  BP(mean): --  RR: 18 (11 Mar 2020 09:00) (18 - 22)  SpO2: 98% (11 Mar 2020 09:00) (98% - 100%)    CONSTITUTIONAL: NAD, well-developed, ill appearing, awake and arousable able to answer questions jaundice with scleral icterus   RESPIRATORY: No wheezing or rhonchi   CARDIOVASCULAR: Regular rate and rhythm, normal S1 and S2, no murmur/rub/gallop; trace lower extremity edema; Peripheral pulses are 2+ bilaterally  ABDOMEN: Nontender to palpation, normoactive bowel sounds, no rebound/guarding; No asterxis   MUSCLOSKELETAL: no clubbing or cyanosis of digits; no joint swelling or tenderness to palpation  PSYCH: A+O to person, place, and time; affect appropriate  : indwelling bob in place draining purulent urine.     LABS:                        8.0    11.76 )-----------( 84       ( 11 Mar 2020 06:36 )             23.6     03-11    132<L>  |  94<L>  |  27<H>  ----------------------------<  212<H>  3.7   |  27  |  0.99    Ca    10.0      11 Mar 2020 06:33  Phos  2.5     03-11  Mg     1.7     03-11    TPro  6.1  /  Alb  2.7<L>  /  TBili  12.3<H>  /  DBili  x   /  AST  23  /  ALT  17  /  AlkPhos  117  03-11    PT/INR - ( 10 Mar 2020 12:06 )   PT: 22.4 sec;   INR: 1.91 ratio         PTT - ( 10 Mar 2020 12:06 )  PTT:34.2 sec  CARDIAC MARKERS ( 10 Mar 2020 12:06 )  x     / x     / 31 U/L / x     / x          Urinalysis Basic - ( 10 Mar 2020 13:43 )    Color: Yellow / Appearance: Slightly Turbid / S.015 / pH: x  Gluc: x / Ketone: Negative  / Bili: Negative / Urobili: 3 mg/dL   Blood: x / Protein: Trace / Nitrite: Negative   Leuk Esterase: Large / RBC: 5 /hpf / WBC 35 /HPF   Sq Epi: x / Non Sq Epi: x / Bacteria: Many          RADIOLOGY & ADDITIONAL TESTS:  Results Reviewed:   Imaging Personally Reviewed:  Electrocardiogram Personally Reviewed:    COORDINATION OF CARE:  Care Discussed with Consultants/Other Providers [Y/N]:  Prior or Outpatient Records Reviewed [Y/N]:

## 2020-03-11 NOTE — PROGRESS NOTE ADULT - SUBJECTIVE AND OBJECTIVE BOX
Chief Complaint:  Patient is a 64y old  Male who presents with a chief complaint of Syncopal episode at home at 0430 this am, LOC for approx 3 minutes per wife, pt had episode of shaking during that time. Hx of multiple falls. Pt reporting abdominal pain for X 1day, no nausea or vomiting, endorses loose stool on lactulose at home (11 Mar 2020 09:04)      Interval Events:   Ct reviewed with radiology, pending official read but concerning for abscess by prostate.  Dark, thick urine in catheter today    Allergies:  codeine (Anaphylaxis)      Hospital Medications:  dextrose 40% Gel 15 Gram(s) Oral once PRN  dextrose 5%. 1000 milliLiter(s) IV Continuous <Continuous>  dextrose 50% Injectable 12.5 Gram(s) IV Push once  dextrose 50% Injectable 25 Gram(s) IV Push once  ertapenem  IVPB 1000 milliGRAM(s) IV Intermittent every 24 hours  folic acid 1 milliGRAM(s) Oral daily  glucagon  Injectable 1 milliGRAM(s) IntraMuscular once PRN  insulin glargine Injectable (LANTUS) 12 Unit(s) SubCutaneous at bedtime  insulin lispro (HumaLOG) corrective regimen sliding scale   SubCutaneous three times a day before meals  insulin lispro Injectable (HumaLOG) 6 Unit(s) SubCutaneous three times a day before meals  lactulose Syrup 20 Gram(s) Oral three times a day  midodrine. 30 milliGRAM(s) Oral three times a day  ondansetron Injectable 4 milliGRAM(s) IV Push every 6 hours PRN  pantoprazole    Tablet 40 milliGRAM(s) Oral before breakfast  rifAXIMin 550 milliGRAM(s) Oral two times a day  tamsulosin 0.4 milliGRAM(s) Oral at bedtime      PMHX/PSHX:  GIB (gastrointestinal bleeding)  GERD with esophagitis  Hepatic encephalopathy  Obesity  Fatty liver disease, nonalcoholic  Renal stones  Hypertension  Neuropathy  Hypercholesteremia  Diabetes  S/P cholecystectomy  No significant past surgical history      Family history:  Family history of type 2 diabetes mellitus  Family history of hypertension  Family history of stomach cancer  No pertinent family history in first degree relatives      ROS:  General: no night sweats, wt loss  CV: no pain, palpitation  Resp: no cough wheezing  Muscles: no weakness or pain  Endocrine: no polyuria, polydipsia, cold/heat intolerance  Heme: No petechia, ecchymosis    PHYSICAL EXAM:   GENERAL:  Appears stated age, well-groomed  HEENT:  NC/AT,  conjunctivae clear and pink, no thyromegaly, +sclera icteric  CHEST:  Full & symmetric excursion, no increased effort, breath sounds clear  HEART:  Regular rhythm, S1, S2, no murmur/rub/S3/S4  ABDOMEN:  Soft, non-tender, +central adiposity, non-distended, normoactive bowel sounds  EXTEREMITIES:  no cyanosis,clubbing or edema  SKIN:  No rash/erythema/ecchymoses, +jaundice  NEURO:  Alert, oriented x4, +faint asterixis    Vital Signs:  Vital Signs Last 24 Hrs  T(C): 37 (11 Mar 2020 04:05), Max: 37.1 (11 Mar 2020 00:03)  T(F): 98.6 (11 Mar 2020 04:05), Max: 98.8 (11 Mar 2020 00:03)  HR: 85 (11 Mar 2020 09:00) (77 - 91)  BP: 95/58 (11 Mar 2020 09:00) (95/58 - 148/68)  BP(mean): --  RR: 18 (11 Mar 2020 09:00) (18 - 22)  SpO2: 98% (11 Mar 2020 09:00) (98% - 100%)  Daily Height in cm: 185.42 (10 Mar 2020 18:54)    Daily     LABS:                        8.0    11.76 )-----------( 84       ( 11 Mar 2020 06:36 )             23.6     03-11    132<L>  |  94<L>  |  27<H>  ----------------------------<  212<H>  3.7   |  27  |  0.99    Ca    10.0      11 Mar 2020 06:33  Phos  2.5     03-11  Mg     1.7     03-11    TPro  6.1  /  Alb  2.7<L>  /  TBili  12.3<H>  /  DBili  x   /  AST  23  /  ALT  17  /  AlkPhos  117  03-11    LIVER FUNCTIONS - ( 11 Mar 2020 06:33 )  Alb: 2.7 g/dL / Pro: 6.1 g/dL / ALK PHOS: 117 U/L / ALT: 17 U/L / AST: 23 U/L / GGT: x           PT/INR - ( 10 Mar 2020 12:06 )   PT: 22.4 sec;   INR: 1.91 ratio         PTT - ( 10 Mar 2020 12:06 )  PTT:34.2 sec  Urinalysis Basic - ( 10 Mar 2020 13:43 )    Color: Yellow / Appearance: Slightly Turbid / S.015 / pH: x  Gluc: x / Ketone: Negative  / Bili: Negative / Urobili: 3 mg/dL   Blood: x / Protein: Trace / Nitrite: Negative   Leuk Esterase: Large / RBC: 5 /hpf / WBC 35 /HPF   Sq Epi: x / Non Sq Epi: x / Bacteria: Many      Amylase Serum--      Lipase serum--       [id number]      Imaging: Chief Complaint:  Patient is a 64y old  Male who presents with a chief complaint of Syncopal episode at home at 0430 this am, LOC for approx 3 minutes per wife, pt had episode of shaking during that time. Hx of multiple falls. Pt reporting abdominal pain for X 1day, no nausea or vomiting, endorses loose stool on lactulose at home (11 Mar 2020 09:04)      Interval Events:   Ct reviewed with radiology, pending official read but concerning for abscess by prostate.  Dark, thick urine in catheter today    Allergies:  codeine (Anaphylaxis)      Hospital Medications:  dextrose 40% Gel 15 Gram(s) Oral once PRN  dextrose 5%. 1000 milliLiter(s) IV Continuous <Continuous>  dextrose 50% Injectable 12.5 Gram(s) IV Push once  dextrose 50% Injectable 25 Gram(s) IV Push once  ertapenem  IVPB 1000 milliGRAM(s) IV Intermittent every 24 hours  folic acid 1 milliGRAM(s) Oral daily  glucagon  Injectable 1 milliGRAM(s) IntraMuscular once PRN  insulin glargine Injectable (LANTUS) 12 Unit(s) SubCutaneous at bedtime  insulin lispro (HumaLOG) corrective regimen sliding scale   SubCutaneous three times a day before meals  insulin lispro Injectable (HumaLOG) 6 Unit(s) SubCutaneous three times a day before meals  lactulose Syrup 20 Gram(s) Oral three times a day  midodrine. 30 milliGRAM(s) Oral three times a day  ondansetron Injectable 4 milliGRAM(s) IV Push every 6 hours PRN  pantoprazole    Tablet 40 milliGRAM(s) Oral before breakfast  rifAXIMin 550 milliGRAM(s) Oral two times a day  tamsulosin 0.4 milliGRAM(s) Oral at bedtime      PMHX/PSHX:  GIB (gastrointestinal bleeding)  GERD with esophagitis  Hepatic encephalopathy  Obesity  Fatty liver disease, nonalcoholic  Renal stones  Hypertension  Neuropathy  Hypercholesteremia  Diabetes  S/P cholecystectomy  No significant past surgical history      Family history:  Family history of type 2 diabetes mellitus  Family history of hypertension  Family history of stomach cancer  No pertinent family history in first degree relatives      ROS:  General: no night sweats, wt loss  CV: no pain, palpitation  Resp: no cough wheezing  Muscles: no weakness or pain  Endocrine: no polyuria, polydipsia, cold/heat intolerance  Heme: No petechia, ecchymosis    PHYSICAL EXAM:   GENERAL:  Appears stated age, well-groomed  HEENT:  NC/AT,  conjunctivae clear and pink, no thyromegaly, +sclera icteric  CHEST:  Full & symmetric excursion, no increased effort, breath sounds clear  HEART:  Regular rhythm, S1, S2, no murmur/rub/S3/S4  ABDOMEN:  Soft, non-tender, +central adiposity, non-distended, normoactive bowel sounds  EXTEREMITIES:  no cyanosis,clubbing or edema  SKIN:  No rash/erythema/ecchymoses, +jaundice  NEURO:  Alert, oriented x4, +faint asterixis    Vital Signs:  Vital Signs Last 24 Hrs  T(C): 37 (11 Mar 2020 04:05), Max: 37.1 (11 Mar 2020 00:03)  T(F): 98.6 (11 Mar 2020 04:05), Max: 98.8 (11 Mar 2020 00:03)  HR: 85 (11 Mar 2020 09:00) (77 - 91)  BP: 95/58 (11 Mar 2020 09:00) (95/58 - 148/68)  BP(mean): --  RR: 18 (11 Mar 2020 09:00) (18 - 22)  SpO2: 98% (11 Mar 2020 09:00) (98% - 100%)  Daily Height in cm: 185.42 (10 Mar 2020 18:54)    Daily     LABS:                        8.0    11.76 )-----------( 84       ( 11 Mar 2020 06:36 )             23.6     03-11    132<L>  |  94<L>  |  27<H>  ----------------------------<  212<H>  3.7   |  27  |  0.99    Ca    10.0      11 Mar 2020 06:33  Phos  2.5     03-11  Mg     1.7     03-11    TPro  6.1  /  Alb  2.7<L>  /  TBili  12.3<H>  /  DBili  x   /  AST  23  /  ALT  17  /  AlkPhos  117  03-11    LIVER FUNCTIONS - ( 11 Mar 2020 06:33 )  Alb: 2.7 g/dL / Pro: 6.1 g/dL / ALK PHOS: 117 U/L / ALT: 17 U/L / AST: 23 U/L / GGT: x           PT/INR - ( 10 Mar 2020 12:06 )   PT: 22.4 sec;   INR: 1.91 ratio         PTT - ( 10 Mar 2020 12:06 )  PTT:34.2 sec  Urinalysis Basic - ( 10 Mar 2020 13:43 )    Color: Yellow / Appearance: Slightly Turbid / S.015 / pH: x  Gluc: x / Ketone: Negative  / Bili: Negative / Urobili: 3 mg/dL   Blood: x / Protein: Trace / Nitrite: Negative   Leuk Esterase: Large / RBC: 5 /hpf / WBC 35 /HPF   Sq Epi: x / Non Sq Epi: x / Bacteria: Many      Amylase Serum--      Lipase serum--       [id number]      Imaging: Chief Complaint:  Patient is a 64y old  Male who presents with a chief complaint of Syncopal episode at home at 0430 this am, LOC for approx 3 minutes per wife, pt had episode of shaking during that time. Hx of multiple falls. Pt reporting abdominal pain for X 1day, no nausea or vomiting, endorses loose stool on lactulose at home (11 Mar 2020 09:04)    Interval Events:   Ct reviewed with radiology, pending official read but concerning for abscess by prostate.  Dark, thick urine in catheter today    Allergies:  codeine (Anaphylaxis)      Hospital Medications:  dextrose 40% Gel 15 Gram(s) Oral once PRN  dextrose 5%. 1000 milliLiter(s) IV Continuous <Continuous>  dextrose 50% Injectable 12.5 Gram(s) IV Push once  dextrose 50% Injectable 25 Gram(s) IV Push once  ertapenem  IVPB 1000 milliGRAM(s) IV Intermittent every 24 hours  folic acid 1 milliGRAM(s) Oral daily  glucagon  Injectable 1 milliGRAM(s) IntraMuscular once PRN  insulin glargine Injectable (LANTUS) 12 Unit(s) SubCutaneous at bedtime  insulin lispro (HumaLOG) corrective regimen sliding scale   SubCutaneous three times a day before meals  insulin lispro Injectable (HumaLOG) 6 Unit(s) SubCutaneous three times a day before meals  lactulose Syrup 20 Gram(s) Oral three times a day  midodrine. 30 milliGRAM(s) Oral three times a day  ondansetron Injectable 4 milliGRAM(s) IV Push every 6 hours PRN  pantoprazole    Tablet 40 milliGRAM(s) Oral before breakfast  rifAXIMin 550 milliGRAM(s) Oral two times a day  tamsulosin 0.4 milliGRAM(s) Oral at bedtime      PMHX/PSHX:  GIB (gastrointestinal bleeding)  GERD with esophagitis  Hepatic encephalopathy  Obesity  Fatty liver disease, nonalcoholic  Renal stones  Hypertension  Neuropathy  Hypercholesteremia  Diabetes  S/P cholecystectomy  No significant past surgical history      Family history:  Family history of type 2 diabetes mellitus  Family history of hypertension  Family history of stomach cancer  No pertinent family history in first degree relatives      ROS:  General: no night sweats, wt loss  CV: no pain, palpitation  Resp: no cough wheezing  Muscles: no weakness or pain  Endocrine: no polyuria, polydipsia, cold/heat intolerance  Heme: No petechia, ecchymosis    PHYSICAL EXAM:   GENERAL:  Appears stated age, well-groomed  HEENT:  NC/AT,  conjunctivae clear and pink, no thyromegaly, +sclera icteric  CHEST:  Full & symmetric excursion, no increased effort, breath sounds clear  HEART:  Regular rhythm, S1, S2, no murmur/rub/S3/S4  ABDOMEN:  Soft, non-tender, +central adiposity, non-distended, normoactive bowel sounds  EXTEREMITIES:  no cyanosis,clubbing or edema  SKIN:  No rash/erythema/ecchymoses, +jaundice  NEURO:  Alert, oriented x4, +faint asterixis    Vital Signs:  Vital Signs Last 24 Hrs  T(C): 37 (11 Mar 2020 04:05), Max: 37.1 (11 Mar 2020 00:03)  T(F): 98.6 (11 Mar 2020 04:05), Max: 98.8 (11 Mar 2020 00:03)  HR: 85 (11 Mar 2020 09:00) (77 - 91)  BP: 95/58 (11 Mar 2020 09:00) (95/58 - 148/68)  BP(mean): --  RR: 18 (11 Mar 2020 09:00) (18 - 22)  SpO2: 98% (11 Mar 2020 09:00) (98% - 100%)  Daily Height in cm: 185.42 (10 Mar 2020 18:54)    Daily     LABS:                        8.0    11.76 )-----------( 84       ( 11 Mar 2020 06:36 )             23.6     03-11    132<L>  |  94<L>  |  27<H>  ----------------------------<  212<H>  3.7   |  27  |  0.99    Ca    10.0      11 Mar 2020 06:33  Phos  2.5     03-11  Mg     1.7     03-11    TPro  6.1  /  Alb  2.7<L>  /  TBili  12.3<H>  /  DBili  x   /  AST  23  /  ALT  17  /  AlkPhos  117  03-11    LIVER FUNCTIONS - ( 11 Mar 2020 06:33 )  Alb: 2.7 g/dL / Pro: 6.1 g/dL / ALK PHOS: 117 U/L / ALT: 17 U/L / AST: 23 U/L / GGT: x           PT/INR - ( 10 Mar 2020 12:06 )   PT: 22.4 sec;   INR: 1.91 ratio         PTT - ( 10 Mar 2020 12:06 )  PTT:34.2 sec  Urinalysis Basic - ( 10 Mar 2020 13:43 )    Color: Yellow / Appearance: Slightly Turbid / S.015 / pH: x  Gluc: x / Ketone: Negative  / Bili: Negative / Urobili: 3 mg/dL   Blood: x / Protein: Trace / Nitrite: Negative   Leuk Esterase: Large / RBC: 5 /hpf / WBC 35 /HPF   Sq Epi: x / Non Sq Epi: x / Bacteria: Many      Amylase Serum--      Lipase serum--       [id number]      Imaging:

## 2020-03-11 NOTE — CONSULT NOTE ADULT - ASSESSMENT
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 GI consulted     Recurrent falls    - multifactorial; likely related to cirrhosis vs deconditioning vs diabetic neuropathy  - mentating at baseline   - Midodrine 30mg TID   - CT head negative for acute intracranial pathology or hemorrhage  - Cardiac workup on prior admission was unremarkable       JEAN cirrhosis    - being followed by hepatology team. UNOS listed for liver transplant  - MELD-Na score of 27 today 03/11/20  - no current signs of HE or SBP; patient is mentating at baseline on lactulose 20g TID and Xifaxan 550mg BID.   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 Care appreciated       GERD  - history of GAVE and duodenal ulcers  - cont PPI daily   - GERD precautions     UTI   - hx of recurrent UTIs  - CT noted; concern for prostate abscess  - pyuria + leukocytosis  - ID eval noted; appreciate recommendations   - f/u Bcx, Ucx  - monitor vitals, trend WBC  - further care per ID appreciated

## 2020-03-11 NOTE — PROGRESS NOTE ADULT - ASSESSMENT
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 syncope 2/2 sepsis from UTI.

## 2020-03-11 NOTE — PHYSICAL THERAPY INITIAL EVALUATION ADULT - PLANNED THERAPY INTERVENTIONS, PT EVAL
stairs: GOAL: Pt will negotiate 3 steps of stairs with or without appropriate assistive device and handrail via reciprocal/step-to technique indep in 2 weeks./balance training/gait training/transfer training

## 2020-03-11 NOTE — CONSULT NOTE ADULT - SUBJECTIVE AND OBJECTIVE BOX
Chief Complaint:  Patient is a 64y old  Male who presents with a chief complaint of Syncope      HPI: 64M known to our practice with pmhx IDDM, dyslipidemia, obesity, GERD, HFpEF with mild LV diastolic dysfunction, and decompensated JEAN cirrhosis complicated by ascites, history of SBP, hepatic encephalopathy, and chronic anemia with a history of duodenal ulcer as well as GAVE and duodenal AVM s/p APC, who is UNOS listed for liver transplantation at [facility] who presents with fall at home. This is patient's third presentation to the hospital this year. Pt is currently asymptomatic currently however states that the first fall occurred at 4.30am, while he was sitting on the toilet. He felt weak and fell forward, but was able to catch himself on the bars. At 8am, patient got up from bed, took a few steps, and fell again, this time onto his knees. Pt denies lightheadedness, chest pain or any prodromal syndrome prior to the fall, but wife noted that patient was unresponsive with eyes open for about 3 mins after falling, was shaking, mouth open. Pt continues to be compliant with all his medications. During his last admission, lasix was decreased to 20mg and aldactone to 50mg in light of recurrent falls. 2 admission ago, patient had septic shock 2/2 ESBL UTI.    GI consulted for syncope. Of note, patient was last seen by our practice on  for recurrent falls secondary to orthostatic hypotension likely 2/2 to deconditioning, cirrhosis, and diabetic neuropathy. Diuretics were adjusted and symptoms improved, patient was subsequently discharged on . Of note, underwent upper gastrointestinal endoscopy with advanced GI team 19 which demonstrated small non-bleeding esophageal varices in the lower third of the esophagus that were not amendable to banding, portal hypertensive gastropathy, gastric antral vascular ectasia (GAVE)  treated with argon plasma coagulation (APC). Upon evaluation, patient is resting comfortably in bed in no apparent distress. Tolerating regular diet. Dark, thick urine was visualized in [place]. He denies fever, chills, confusion, dizziness, nausea, vomiting, abdominal pain, constipation, diarrhea, hematemesis, melena, brbpr, hematochezia     Allergies:  codeine (Anaphylaxis)      Medications:  albumin human 25% IVPB 100 milliLiter(s) IV Intermittent every 8 hours  dextrose 40% Gel 15 Gram(s) Oral once PRN  dextrose 5%. 1000 milliLiter(s) IV Continuous <Continuous>  dextrose 50% Injectable 12.5 Gram(s) IV Push once  dextrose 50% Injectable 25 Gram(s) IV Push once  ertapenem  IVPB 1000 milliGRAM(s) IV Intermittent every 24 hours  folic acid 1 milliGRAM(s) Oral daily  glucagon  Injectable 1 milliGRAM(s) IntraMuscular once PRN  insulin glargine Injectable (LANTUS) 12 Unit(s) SubCutaneous at bedtime  insulin lispro (HumaLOG) corrective regimen sliding scale   SubCutaneous three times a day before meals  insulin lispro Injectable (HumaLOG) 6 Unit(s) SubCutaneous three times a day before meals  lactulose Syrup 20 Gram(s) Oral three times a day  midodrine. 30 milliGRAM(s) Oral three times a day  ondansetron Injectable 4 milliGRAM(s) IV Push every 6 hours PRN  pantoprazole    Tablet 40 milliGRAM(s) Oral before breakfast  rifAXIMin 550 milliGRAM(s) Oral two times a day  tamsulosin 0.4 milliGRAM(s) Oral at bedtime      PMHX/PSHX:  GIB (gastrointestinal bleeding)  GERD with esophagitis  Hepatic encephalopathy  Obesity  Fatty liver disease, nonalcoholic  Renal stones  Hypertension  Neuropathy  Hypercholesteremia  Diabetes  S/P cholecystectomy  No significant past surgical history      Family history:  Family history of type 2 diabetes mellitus  Family history of hypertension  Family history of stomach cancer  No pertinent family history in first degree relatives      Social History:     ROS:     General:  No wt loss, fevers, chills, night sweats, fatigue,   Eyes:  Good vision, no reported pain  ENT:  No sore throat, pain, runny nose, dysphagia  CV:  No pain, palpitations, hypo/hypertension  Resp:  No dyspnea, cough, tachypnea, wheezing  GI:  No pain, No nausea, No vomiting, No diarrhea, No constipation, No weight loss, No fever, No pruritis, No rectal bleeding, No tarry stools, No dysphagia,  :  No pain, bleeding, incontinence, nocturia  Muscle:  No pain, weakness  Neuro:  No weakness, tingling, memory problems  Psych:  No fatigue, insomnia, mood problems, depression  Endocrine:  No polyuria, polydipsia, cold/heat intolerance  Heme:  No petechiae, ecchymosis, easy bruisability  Skin:  No rash, tattoos, scars, edema      PHYSICAL EXAM:   Vital Signs:  Vital Signs Last 24 Hrs  T(C): 36.7 (11 Mar 2020 14:32), Max: 37.1 (11 Mar 2020 00:03)  T(F): 98 (11 Mar 2020 14:32), Max: 98.8 (11 Mar 2020 00:03)  HR: 80 (11 Mar 2020 14:32) (77 - 91)  BP: 118/64 (11 Mar 2020 14:32) (95/58 - 148/68)  BP(mean): --  RR: 18 (11 Mar 2020 14:32) (18 - 18)  SpO2: 100% (11 Mar 2020 14:) (98% - 100%)  Daily Height in cm: 185.42 (10 Mar 2020 18:54)    Daily     GENERAL:  Appears stated age, no distress  HEENT:  +scleral icterus  CHEST:  Full & symmetric excursion, no increased effort, breath sounds clear  HEART:  Regular rhythm, S1, S2, no murmur/rub/S3/S4, no abdominal bruit, no edema  ABDOMEN:  obese, Soft, non-tender, non-distended, normoactive bowel sounds  EXTEREMITIES:  no cyanosis,clubbing or edema  SKIN: jaundice  NEURO:  Alert, oriented, no asterixis, no tremor, no encephalopathy    LABS:                        8.0    11.76 )-----------( 84       ( 11 Mar 2020 06:36 )             23.6     03-11    132<L>  |  94<L>  |  27<H>  ----------------------------<  212<H>  3.7   |  27  |  0.99    Ca    10.0      11 Mar 2020 06:33  Phos  2.5     03-11  Mg     1.7     03-11    TPro  6.1  /  Alb  2.7<L>  /  TBili  12.3<H>  /  DBili  x   /  AST  23  /  ALT  17  /  AlkPhos  117  03-11    LIVER FUNCTIONS - ( 11 Mar 2020 06:33 )  Alb: 2.7 g/dL / Pro: 6.1 g/dL / ALK PHOS: 117 U/L / ALT: 17 U/L / AST: 23 U/L / GGT: x           PT/INR - ( 10 Mar 2020 12:06 )   PT: 22.4 sec;   INR: 1.91 ratio         PTT - ( 10 Mar 2020 12:06 )  PTT:34.2 sec  Urinalysis Basic - ( 10 Mar 2020 13:43 )    Color: Yellow / Appearance: Slightly Turbid / S.015 / pH: x  Gluc: x / Ketone: Negative  / Bili: Negative / Urobili: 3 mg/dL   Blood: x / Protein: Trace / Nitrite: Negative   Leuk Esterase: Large / RBC: 5 /hpf / WBC 35 /HPF   Sq Epi: x / Non Sq Epi: x / Bacteria: Many      Amylase Serum--      Lipase serum--       [id number]      Imaging:    < from: CT Abdomen and Pelvis No Cont (20 @ 01:06) >    EXAM:  CT ABDOMEN AND PELVIS                            PROCEDURE DATE:  2020            INTERPRETATION:  CLINICAL INFORMATION: Cirrhosis. Recurrent UTI. Abnormal urinalysis and history of renal stones. Evaluate for renal stones, ascites.    COMPARISON: CT abdomen and pelvis 2020.    PROCEDURE:   CT of the Abdomen and Pelvis was performed without intravenous contrast.   Intravenous contrast: None.  Oral contrast: None.  Sagittal and coronal reformats were performed.    FINDINGS:    LOWER CHEST: Coronary artery calcification.    LIVER: Cirrhotic morphology.  BILE DUCTS: Normal caliber.  GALLBLADDER: Cholecystectomy.  SPLEEN: Within normal limits.  PANCREAS: Within normal limits.  ADRENALS: Within normal limits.  KIDNEYS/URETERS: Nonobstructing left renal calculi with the largest measuring 0.6 cm. No hydronephrosis.    BLADDER: Collapsed around Beasley catheter.  REPRODUCTIVE ORGANS: Diffuse low density appearance of the prostate measures 8.5 x 7.9 cm previously 6.4 x 5 cm.     BOWEL: No bowel obstruction. Appendix is normal.  PERITONEUM: Trace abdominal ascites.  VESSELS: Calcification of the aorta and its branches. Left upper quadrant varices.  RETROPERITONEUM/LYMPH NODES: Scattered mesenteric lymph nodes. Retroperitoneal collaterals also noted.  ABDOMINAL WALL: Within normal limits.  BONES: Degenerative changes.    IMPRESSION:     Diffuse low density appearance of the prostate which is increased in size; question underlying abscess. A pelvic MRI and urology consultation are recommended.                NIKKI NAJERA M.D., RADIOLOGY RESIDENT  This document has been electronically signed.  NOHEMI EAST M.D., ATTENDING RADIOLOGIST  This document has been electronically signed. Mar 11 2020  8:34AM                < end of copied text >      < from: CT Head No Cont (03.10.20 @ 11:09) >    EXAM:  CT BRAIN                            PROCEDURE DATE:  03/10/2020            INTERPRETATION:  CLINICAL INFORMATION: Possible seizure    TECHNIQUE: Noncontrast axial CT images were acquired through the head. Two-dimensional sagittal and coronalreformats were generated.    COMPARISON STUDY: CT of the head from 2020, 2018    FINDINGS:   There is no CT evidence of acute intracranial hemorrhage, extra-axial collection, vasogenic edema, mass effect, midline shift, central herniation, or hydrocephalus.     The ventricles and sulci are slightly prominent consistent with mild atrophy. Mild periventricular white matter hypoattenuation.    Mild mucosal thickening of the bilateral ethmoid sinuses.. The mastoid air cells and middle ear cavities are clear.    The soft tissues of the scalp are unremarkable. The calvarium is intact.    IMPRESSION:   No CT evidence of acute intracranial hemorrhage, brain edema, or mass effect. Atrophy and small vessel white matter ischemic changes. No change since 2020.                RONALD LEACH M.D., RADIOLOGY RESIDENT  This document has been electronically signed.  CALE BACA M.D., ATTENDING RADIOLOGIST  This document has been electronically signed. Mar 10 2020 11:16AM                < end of copied text >

## 2020-03-11 NOTE — PROGRESS NOTE ADULT - PROBLEM SELECTOR PLAN 2
Severe sepsis with elevated lacate in setting of infection and severe liver disease  F/U blood cultures and urine cultures  Will start Albumin Q8hrs and repeat lactate after first albumin bolus   Continue ERtapenem for now  F/u ID recs

## 2020-03-11 NOTE — PROGRESS NOTE ADULT - PROBLEM SELECTOR PLAN 6
Midodrine 30 mg oral 3times /day   Will get Orthostatic BP once able   will repeat serum cortisol as last cortisol in 12/3/19 was 6.6   Support stocking / will cont DASH diet for now

## 2020-03-11 NOTE — CONSULT NOTE ADULT - ASSESSMENT
64M with DM2,  dyslipidemia, obesity, BPH, Orthostatic hypotension, GERD, HTN, Neuropathy, Obesity, HFpEF with mild LV diastolic dysfunction, and decompensated JEAN cirrhosis complicated by ascites, history of SBP, hepatic encephalopathy, and chronic anemia with a history of duodenal ulcer as well as GAVE and duodenal AVM s/p APC (last on 10/11/19), who is UNOS listed for liver transplantation at [facility] who presents with fall at home, general weakness and ? syncope in setting of UTI    - Syncope is most likely vagal in setting of urinary tract infection   - check orthostatics  - Hold diuretics as not vol ol  - Midodrine increased to 30mg q8 but unclear how much more efficacy he will get from this dose.   - I doubt this was arrhythmic but his prolonged QTC is concerning. This was seen on his previous admission as well  - Avoid QTC prolonging drugs  - Needs daily EKGs  - Monitor and replete electrolytes. Keep K>4.0 and Mg>2.0. Replete Mg today  - cont abx    - No signs of significant ischemia. Cath with non obs disease  - echo in 2/2020 with normal LV function . no need to repeat.   - F/U Gi     - Further cardiac workup will depend on clinical course.   - All other workup per primary team. Will followup.

## 2020-03-11 NOTE — PROGRESS NOTE ADULT - PROBLEM SELECTOR PLAN 4
MELD-NA 28 3/10/2020  JEAN Cirrhosis: UNOS listed for liver transplant.   Decompensated by esophageal varices, ascites, h/o hepatic encephalopathy and h/o SBP.   -Ascites: Trace on CT abdomen/pelvis from 02/11/20  -Varices: Small non-bleeding EV on EGD from 12/2019.  -HCC: No focal liver lesions on MRI abdomen 12/2019.   -PSE: No asterixis.   Holding diuretics  Continue ALbumin for now

## 2020-03-11 NOTE — PROGRESS NOTE ADULT - PROBLEM SELECTOR PLAN 9
Hepatology team is following patient  Transitions of Care Status:  1.  Name of PCP:  2.  PCP Contacted on Admission: [ ] Y    [ ] N    3.  PCP contacted at Discharge: [ ] Y    [ ] N    [ ] N/A  4.  Post-Discharge Appointment Date and Location:  5.  Summary of Handoff given to PCP:

## 2020-03-11 NOTE — PHYSICAL THERAPY INITIAL EVALUATION ADULT - PERTINENT HX OF CURRENT PROBLEM, REHAB EVAL
64M with DM2,  dyslipidemia, obesity, BPH, Orthostatic hypotension, GERD, HTN, Neuropathy, Obesity, HFpEF with mild LV diastolic dysfunction, and decompensated JEAN cirrhosis complicated by ascites, history of SBP, hepatic encephalopathy, and chronic anemia with a history of duodenal ulcer as well as GAVE and duodenal AVM s/p APC (last on 10/11/19), who is UNOS listed for liver transplantation at [facility] who presents with fall at home.

## 2020-03-11 NOTE — CONSULT NOTE ADULT - ASSESSMENT
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 He was found to have pyuria and leukocytosis. His urine is very thick, purulent appearing which is concerning for a colo-vesicular fistula. CTAP shows a large increase in the size of his prostate, which is concerning for abscess.   At this time, he is also listed for OLT.     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  - Stop ertapenem  - Start meropenem 1g q 8 hours  - Check MRI AP with contrast to evaluate for prostate abscess  - Urology consult   - Follow up cultures  - Monitor for fevers  - Trend WBCs    Pre OLT evaluation   CMV: negative  Toxo: negative  Hepatitis A: immune  Hepatitis B: not immune  Hepatitis C: negative  HIV: negative  HSV-1: positive  HSV-2: positive  Measles: immune  Mumps: immune  Rubella: immune  Quantiferon: negative  Varicella: immune    Vaccinations  - Prevnar given 12/2019  - Pneumovax given 2/2020  - Influenza given 11/2019    - Give hepatitis B vaccine (hemodialysis dose) this admission    Estefanía Henderson, PGY-4  Infectious Disease Fellow   Pager: [phone]  After 5pm/weekends: [phone]

## 2020-03-11 NOTE — CONSULT NOTE ADULT - ATTENDING COMMENTS
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   Recent admission for ESBL E coli Bacteremia and UTI  Patient found to have pyuria on U/A and urine with visible thick sediment  CT A/P (reviewed with radiology) with significant interval increase in prostate size from last month (concerning for abscess)  Would switch Ertapenem to meropenem for now (would add enterococcal and pseudomonas coverage pending UCx)  Would obtain MRI with IV contrast of pelvis to evaluate for prostatic abscess  If concerns remains for colovesicular/colourethral fistula on MRI may need CT A/P with PO contrast  Would consult urology  Given prostatitis/UTI/abscess patient is not cleared from ID perspective at this time for OLT    Overall, UTI, Prostatitis, Leukocytosis, Hypotension, Pre-OLT    I will continue to follow. Please feel free to contact me with any further questions.    Eusebio Pérez M.D.  [facility] Division of Infectious Disease  8AM-5PM: Pager Number [phone]  After Hours (or if no response): Please contact the Infectious Diseases Office at [phone]     The above assessment and plan were discussed with Dr Yudy Santana (medicine hospitalist)

## 2020-03-11 NOTE — PROGRESS NOTE ADULT - PROBLEM SELECTOR PLAN 3
General with urinary symptoms with leucocytosis and abnormal urinalysis   cont Ertapenem   Prolonged QTC, cardio following for this will avoid prolonging medications  Daily EKG  Will get ORthostatic BP

## 2020-03-11 NOTE — PHYSICAL THERAPY INITIAL EVALUATION ADULT - ADDITIONAL COMMENTS
Pt lives in a private house, 3 steps to enter, +handrail; no stairs inside.    HEAD CT 3/10: No CT evidence of acute intracranial hemorrhage, brain edema, or mass effect. Atrophy and small vessel white matter ischemic changes. No change since 2/28/2020. CT ABDOMEN/PELVIS 3/11: Diffuse low density appearance of the prostate which is increased in size; question underlying abscess. A pelvic MRI and urology consultation are recommended.

## 2020-03-11 NOTE — PROGRESS NOTE ADULT - ASSESSMENT
Impression:    #JEAN Cirrhosis: UNOS listed for liver transplant. MELD-Na 28 3/10  Decompensated by esophageal varices, ascites, h/o hepatic encephalopathy and h/o SBP.   -Ascites: Trace on CT abdomen/pelvis from 02/11/20  -Varices: Small non-bleeding EV on EGD from 12/2019.  -HCC: No focal liver lesions on MRI abdomen 12/2019.   -PSE: No asterixis.     #Concern for abscess on CT scan    #Recurrent falls: Differential includes deconditioning vs. diabetic neuropathy. Cannot rule out infectious work up  Previous work up from previous hospitalization:    -TTE unremarkable this admission, less likely cardiac cause. CT head without acute neurologic findings.   -Mental status at baseline on lactulose    Recommendations:  - Start empiric antibiotics for presumed recurrent UTI with meropenem (given past history of ESBL E Coli), to be further discussed with transplant ID given recent CT findings  - Hold diuretics for now, give albumin 25% 100 mL iv q8h  - Please obtain full infectious work up: CXR, UA, blood cultures  - Will consult Transplant ID but may require repeat CT scan given recurrent UTI and recent emphysematous cystitis, to rule out abscess  - Continue with midodrine 30mg TID  - Continue Lactulose and Rifaximin

## 2020-03-11 NOTE — CONSULT NOTE ADULT - SUBJECTIVE AND OBJECTIVE BOX
INFECTIOUS DISEASE SERVICE INITIAL CONSULTATION NOTE    HPI:  64M with DM2,  dyslipidemia, obesity, BPH, Orthostatic hypotension, GERD, HTN, Neuropathy, Obesity, HFpEF with mild LV diastolic dysfunction, and decompensated JEAN cirrhosis complicated by ascites, history of SBP, hepatic encephalopathy, and chronic anemia with a history of duodenal ulcer as well as GAVE and duodenal AVM s/p APC (last on 10/11/19), who is UNOS listed for liver transplantation at [facility] who presents with fall at home, general weakness and ? syncope.  Patient states that he was doing ok after dc from hospital and about Saturday 3/7, he started feeling generally weak and had diarrhea where he was moving his BM every 15 mns and his wife told him to hold the Lactulose which he did.  He was was having the constant urge to urinate .  However, yesterday , the Diarrhea stopped but he could not urinate.  Pt denies any nausea, vomiting, abd pain , no cough, no fever or chills or chest pain .  NO travel of sick contact.  NO visitors to his house ( of note, patient's youngest child in high school was told to stay at home for 14 days from school , not because of known exposure as per father .  It is because he did not go to school because he had some knee pain for one day as per father.    ED   :   Vitals are stable Pt received NS 500ml and one dose of Ertapenem due to abnormal urinalysis, blood and UCX were sent    Previous admissions :  20 - 3/03/20 for falls where SANTANA  was recommended but pt declined and was sent home .  Pt was on Lasix 40 mg daily alternating with 20mg which was changed to Lasix 20 mg oral daily and Midodrine increased from 20 mg oral THree times daily to 30 mg three times a day. (10 Mar 2020 15:41)      PAST MEDICAL & SURGICAL HISTORY:  GIB (gastrointestinal bleeding)  GERD with esophagitis: Gastritis &amp; Non Bleeding Ulcers  Hepatic encephalopathy  Obesity  Fatty liver disease, nonalcoholic  Renal stones: 25 years ago  Hypertension  Neuropathy  Hypercholesteremia  Diabetes  S/P cholecystectomy      REVIEW OF SYSTEMS:  Constitutional: no weakness, no fevers, no chills  Dermatologic: no rash  Respiratory: no SOB, no cough  Cardiovascular: no chest pain, no palpitations  Gastrointestinal: no nausea, no vomiting, no diarrhea  Genitourinary: no dysuria, no urinary frequency, no hematuria, no urinary retention  Musculoskeletal:	no weakness, no joint swelling/pain  Neurological: no focal weakness or numbness  Endocrine: no polyuria, no polydipsia    ACTIVE ANTIMICROBIAL/ANTIBIOTIC MEDICATIONS:  ertapenem  IVPB 1000 milliGRAM(s) IV Intermittent every 24 hours  rifAXIMin 550 milliGRAM(s) Oral two times a day      OTHER MEDICATIONS:  albumin human 25% IVPB 100 milliLiter(s) IV Intermittent every 8 hours  dextrose 40% Gel 15 Gram(s) Oral once PRN  dextrose 5%. 1000 milliLiter(s) IV Continuous <Continuous>  dextrose 50% Injectable 12.5 Gram(s) IV Push once  dextrose 50% Injectable 25 Gram(s) IV Push once  folic acid 1 milliGRAM(s) Oral daily  glucagon  Injectable 1 milliGRAM(s) IntraMuscular once PRN  insulin glargine Injectable (LANTUS) 12 Unit(s) SubCutaneous at bedtime  insulin lispro (HumaLOG) corrective regimen sliding scale   SubCutaneous three times a day before meals  insulin lispro Injectable (HumaLOG) 6 Unit(s) SubCutaneous three times a day before meals  lactulose Syrup 20 Gram(s) Oral three times a day  magnesium sulfate  IVPB 1 Gram(s) IV Intermittent once  midodrine. 30 milliGRAM(s) Oral three times a day  ondansetron Injectable 4 milliGRAM(s) IV Push every 6 hours PRN  pantoprazole    Tablet 40 milliGRAM(s) Oral before breakfast  tamsulosin 0.4 milliGRAM(s) Oral at bedtime      ALLERGIES:  Allergies    codeine (Anaphylaxis)    Intolerances        SOCIAL HISTORY:      FAMILY HISTORY:  FAMILY HISTORY:  Family history of type 2 diabetes mellitus  Family history of hypertension  Family history of stomach cancer      VITAL SIGNS:  ICU Vital Signs Last 24 Hrs  T(C): 37 (11 Mar 2020 04:05), Max: 37.1 (11 Mar 2020 00:03)  T(F): 98.6 (11 Mar 2020 04:05), Max: 98.8 (11 Mar 2020 00:03)  HR: 85 (11 Mar 2020 09:00) (77 - 91)  BP: 95/58 (11 Mar 2020 09:00) (95/58 - 148/68)  BP(mean): --  ABP: --  ABP(mean): --  RR: 18 (11 Mar 2020 09:00) (18 - 22)  SpO2: 98% (11 Mar 2020 09:00) (98% - 100%)      PHYSICAL EXAM:  Constitutional: WDWN  Head: NC/AT  Eyes: PERRLA, EOMI; anicteric slcera  ENMT: no rhinorrhea; no sinus tenderness on palpation; no oropharyngeal lesions, erythema, or exudates	  Neck: supple; no JVD or LAD  Respiratory: CTA B/L  Cardiovascular: +S1/S2, RRR; no appreciable murmurs  Gastrointestinal: soft, NT/ND; +BSx4, no HSM  Extremities: WWP; no clubbing, cyanosis, or edema  Vascular: 2+ radial, DP/PT pulses B/L  Dermatologic: skin warm and dry; no visible rashes or lesions  Neurologic: AAOx3; no focal deficits    LABS:                        8.0    11.76 )-----------( 84       ( 11 Mar 2020 06:36 )             23.6     03-11    132<L>  |  94<L>  |  27<H>  ----------------------------<  212<H>  3.7   |  27  |  0.99    Ca    10.0      11 Mar 2020 06:33  Phos  2.5     03-11  Mg     1.7     03-11    TPro  6.1  /  Alb  2.7<L>  /  TBili  12.3<H>  /  DBili  x   /  AST  23  /  ALT  17  /  AlkPhos  117  03-11    PT/INR - ( 10 Mar 2020 12:06 )   PT: 22.4 sec;   INR: 1.91 ratio         PTT - ( 10 Mar 2020 12:06 )  PTT:34.2 sec  Urinalysis Basic - ( 10 Mar 2020 13:43 )    Color: Yellow / Appearance: Slightly Turbid / S.015 / pH: x  Gluc: x / Ketone: Negative  / Bili: Negative / Urobili: 3 mg/dL   Blood: x / Protein: Trace / Nitrite: Negative   Leuk Esterase: Large / RBC: 5 /hpf / WBC 35 /HPF   Sq Epi: x / Non Sq Epi: x / Bacteria: Many        MICROBIOLOGY:      RADIOLOGY & ADDITIONAL STUDIES: INFECTIOUS DISEASE SERVICE INITIAL CONSULTATION NOTE    HPI:  64M with DM2,  dyslipidemia, obesity, BPH, Orthostatic hypotension, GERD, HTN, Neuropathy, Obesity, HFpEF with mild LV diastolic dysfunction, and decompensated JEAN cirrhosis complicated by ascites, history of SBP, hepatic encephalopathy, and chronic anemia with a history of duodenal ulcer as well as GAVE and duodenal AVM s/p APC (last on 10/11/19), who is UNOS listed for liver transplantation at [facility] who presents with fall at home, general weakness and ? syncope.  Patient states that he was doing ok after dc from hospital and about Saturday 3/7, he started feeling generally weak and had diarrhea where he was moving his BM every 15 mns and his wife told him to hold the Lactulose which he did.  He was was having the constant urge to urinate .  However, yesterday , the Diarrhea stopped but he could not urinate.  Pt denies any nausea, vomiting, abd pain , no cough, no fever or chills or chest pain .  NO travel of sick contact.  NO visitors to his house ( of note, patient's youngest child in high school was told to stay at home for 14 days from school , not because of known exposure as per father .  It is because he did not go to school because he had some knee pain for one day as per father.    ED   :   Vitals are stable Pt received NS 500ml and one dose of Ertapenem due to abnormal urinalysis, blood and UCX were sent    Previous admissions :  20 - 3/03/20 for falls where SANTANA  was recommended but pt declined and was sent home .  Pt was on Lasix 40 mg daily alternating with 20mg which was changed to Lasix 20 mg oral daily and Midodrine increased from 20 mg oral THree times daily to 30 mg three times a day. (10 Mar 2020 15:41)    ID consulted for suspected UTI.   The patient reports that he has been having increased urination with pain, and horrible smelling urine. The urine has recently became a bit thick as well.   He denies fevers, chills, coughing, chest pain, nausea, vomiting, abdominal pain, or changes in his BMs.     PAST MEDICAL & SURGICAL HISTORY:  GIB (gastrointestinal bleeding)  GERD with esophagitis: Gastritis &amp; Non Bleeding Ulcers  Hepatic encephalopathy  Obesity  Fatty liver disease, nonalcoholic  Renal stones: 25 years ago  Hypertension  Neuropathy  Hypercholesteremia  Diabetes  S/P cholecystectomy      REVIEW OF SYSTEMS:  Constitutional: no weakness, no fevers, no chills  Dermatologic: no rash  Respiratory: no SOB, no cough  Cardiovascular: no chest pain, no palpitations  Gastrointestinal: no nausea, no vomiting, no diarrhea  Genitourinary: +dysuria, +urinary frequency, no hematuria, no urinary retention  Musculoskeletal:	no weakness, no joint swelling/pain  Neurological: no focal weakness or numbness  Endocrine: no polyuria, no polydipsia    ACTIVE ANTIMICROBIAL/ANTIBIOTIC MEDICATIONS:  ertapenem  IVPB 1000 milliGRAM(s) IV Intermittent every 24 hours  rifAXIMin 550 milliGRAM(s) Oral two times a day      OTHER MEDICATIONS:  albumin human 25% IVPB 100 milliLiter(s) IV Intermittent every 8 hours  dextrose 40% Gel 15 Gram(s) Oral once PRN  dextrose 5%. 1000 milliLiter(s) IV Continuous <Continuous>  dextrose 50% Injectable 12.5 Gram(s) IV Push once  dextrose 50% Injectable 25 Gram(s) IV Push once  folic acid 1 milliGRAM(s) Oral daily  glucagon  Injectable 1 milliGRAM(s) IntraMuscular once PRN  insulin glargine Injectable (LANTUS) 12 Unit(s) SubCutaneous at bedtime  insulin lispro (HumaLOG) corrective regimen sliding scale   SubCutaneous three times a day before meals  insulin lispro Injectable (HumaLOG) 6 Unit(s) SubCutaneous three times a day before meals  lactulose Syrup 20 Gram(s) Oral three times a day  magnesium sulfate  IVPB 1 Gram(s) IV Intermittent once  midodrine. 30 milliGRAM(s) Oral three times a day  ondansetron Injectable 4 milliGRAM(s) IV Push every 6 hours PRN  pantoprazole    Tablet 40 milliGRAM(s) Oral before breakfast  tamsulosin 0.4 milliGRAM(s) Oral at bedtime      ALLERGIES:  Allergies    codeine (Anaphylaxis)    Intolerances        SOCIAL HISTORY: No smoking, drugs, or alcohol. Lives in [place] with family.     FAMILY HISTORY:  Family history of type 2 diabetes mellitus  Family history of hypertension  Family history of stomach cancer      VITAL SIGNS:  ICU Vital Signs Last 24 Hrs  T(C): 37 (11 Mar 2020 04:05), Max: 37.1 (11 Mar 2020 00:03)  T(F): 98.6 (11 Mar 2020 04:05), Max: 98.8 (11 Mar 2020 00:03)  HR: 85 (11 Mar 2020 09:00) (77 - 91)  BP: 95/58 (11 Mar 2020 09:00) (95/58 - 148/68)  BP(mean): --  ABP: --  ABP(mean): --  RR: 18 (11 Mar 2020 09:00) (18 - 22)  SpO2: 98% (11 Mar 2020 09:00) (98% - 100%)      PHYSICAL EXAM:  Constitutional: Well appearing man lying comfortably in bed, jaundiced  Head: NC/AT  Eyes: sceral icterus  ENMT: no rhinorrhea; no oropharyngeal lesions, erythema, or exudates	  Neck: supple; no JVD or LAD  Respiratory: CTA B/L  Cardiovascular: +S1/S2, RRR; no appreciable murmurs  Gastrointestinal: soft, NT/ND; +BSx4, no HSM  Extremities: WWP; no clubbing, cyanosis, or edema  Dermatologic: small scrape on lateral right knee without drainage or surrounding erythema  Neurologic: AAOx3; no focal deficits    LABS:                        8.0    11.76 )-----------( 84       ( 11 Mar 2020 06:36 )             23.6     03-11    132<L>  |  94<L>  |  27<H>  ----------------------------<  212<H>  3.7   |  27  |  0.99    Ca    10.0      11 Mar 2020 06:33  Phos  2.5     03-11  Mg     1.7     03-11    TPro  6.1  /  Alb  2.7<L>  /  TBili  12.3<H>  /  DBili  x   /  AST  23  /  ALT  17  /  AlkPhos  117  03-11    PT/INR - ( 10 Mar 2020 12:06 )   PT: 22.4 sec;   INR: 1.91 ratio         PTT - ( 10 Mar 2020 12:06 )  PTT:34.2 sec  Urinalysis Basic - ( 10 Mar 2020 13:43 )    Color: Yellow / Appearance: Slightly Turbid / S.015 / pH: x  Gluc: x / Ketone: Negative  / Bili: Negative / Urobili: 3 mg/dL   Blood: x / Protein: Trace / Nitrite: Negative   Leuk Esterase: Large / RBC: 5 /hpf / WBC 35 /HPF   Sq Epi: x / Non Sq Epi: x / Bacteria: Many        MICROBIOLOGY:  Blood and urine cultures are pending    TRANSPLANT CLEARANCE LABWORK    Treponema Pallidum Antibody Interpretation: Negative (19 @ 08:33)  Treponema Pallidum Antibody Interpretation: Negative (19 @ 09:01)    Quantiferon TB Plus: NEGATIVE (19 @ 08:38)  Quantiferon TB Plus Nil: 0.16 IU/mL (19 @ 08:38)  Quantiferon TB Plus TB1 minus Nil: 0.00 IU/mL (19 @ 08:38)  Quantiferon TB Plus TB2 minus Nil: 0.01 IU/mL (19 @ 08:38)  Quantiferon TB Plus: NEGATIVE (19 08:51)  Quantiferon TB Plus Nil: 0.03 IU/mL (19 @ 08:51)  Quantiferon TB Plus TB1 minus Nil: 0.00 IU/mL (19 @ 08:51)  Quantiferon TB Plus TB2 minus Nil: 0.00 IU/mL (19 @ 08:51)     EBV VCA IgM EIA: <10.0 U/mL (19 @ 08:33)  EBV VCA IgG EIA: >750.0 U/mL (19 @ 08:33)  EBV EA Ab EIA: <5.0 U/mL (19 @ 08:33)     CMV IgG Antibody: <0.20 U/mL (19 @ 08:33)  CMV IgG Interpretation: Negative (19 @ 08:33)     Herpes simplex 1 IgG Ab Result: 37.20 Index (19 @ 08:33)  Herpes simplex 1 IgG Ab Interp: Positive (19 @ 08:33)  Herpes simplex 2 IgG Ab Result: 0.20 Index (19 @ 08:33)  Herpes simplex 2 IgG Ab Interp: Negative (19 @ 08:33)     Varicella IgG Antibody: 2083.0 Index (19 @ 08:33)  Varicella IgG Interpretation: Positive (19 @ 08:33)     Hepatitis A IgG Ab Result: Reactive (19 @ 08:33)     Hepatitis B Core Antibody, Total: Nonreact (19 @ 08:33)  Hepatitis B Surface Antibody: Nonreact (19 @ 08:33)  Hepatitis B Surface Antigen: Nonreact (19 08:33)  Hepatitis B Surface AB Quantitative: <3.0 mIU/mL (19 @ 08:33)  Hepatitis B Surface Antigen: Nonreact (19 @ 09:01)  Hepatitis B Core IgM Antibody: Nonreact (19 @ 09:01)  Hepatitis B Surface Antigen: Nonreact (19 @ 00:11)  Hepatitis B Core IgM Antibody: Nonreact (19 @ 00:11)  Hepatitis B Core IgM Antibody: Nonreact (10-23-19 @ 17:56)  Hepatitis B Surface Antigen: Nonreact (10-23-19 @ 17:56)       Hepatitis C RNA, Qualitative: NotDetec (19 @ 08:38)  Hepatitis C RNA Qualitative Interp: HCV RNA Qualitative assay by RT-PCR using [employer] [id number]    The limit of detection: 12 IU/mL.    Result Interpretation:  Not Detected:  HCV RNA is not detected; possibly indicates resolved past  infection or a false reactive antibody result.  Detected:        HCV RNA is detected; indicates hepatitis C virus  infection.  CDC guidelines and [facility] require confirmation of reactive  hepatitis C antibody screening tests. Results should be viewed in the  context of clinical history and other labtests. An indeterminate result  indicates that HCV RNA detection cannot be resolved, please consider  retesting with a new sample. The performance characteristics of this  assay have been determined by [facility] [employer]. The U.[place] Food and  Drug Administration (FDA) has not approved or cleared this test, however,  FDA clearance or approval is not currently required for clinical use. (19 @ 08:38)  Hepatitis C Virus S/CO Ratio: 0.14 S/CO (19 @ 08:33)  Hepatitis C Virus Interpretation: Nonreact (19 @ 08:33)  Hepatitis C Virus S/CO Ratio: 0.12 S/CO (19 @ 09:01)  Hepatitis C Virus Interpretation: Nonreact (19 @ 09:01)  Hepatitis C Virus S/CO Ratio: 0.16 S/CO (19 @ 00:11)  Hepatitis C Virus Interpretation: Nonreact (19 @ 00:11)  Hepatitis C Virus S/CO Ratio: 0.15 S/CO (10-23-19 @ 17:56)  Hepatitis C Virus Interpretation: Nonreact (10-23-19 @ 17:56)  Hepatitis C Virus S/CO Ratio: 0.14 S/CO (19 @ 16:27)  Hepatitis C Virus Interpretation: Nonreact (19 @ 16:27)     Mumps Virus IgG Antibody.: 236.0 AU/mL (19 @ 10:27)  Mumps Antibody Interpretation: Positive (19 @ 10:27)    Measles IgG Interpretation: Positive (19 @ 10:27)    Rubella Virus IgG Antibody.: 30.0 Index (19 @ 08:33)  Rubella IgG Interp: Positive (19 @ 08:33)    Toxoplasma IgG Screen: <3.0 IU/mL (19 @ 08:33)  Toxoplasma IgG Interpretation: Negative (19 @ 08:33)    Rapid HIV-1/2 Antibody (10.23.19 @ 05:08)    Rapid HIV-1/2 Antibody: Nonreact        RADIOLOGY & ADDITIONAL STUDIES:    < from: CT Abdomen and Pelvis No Cont (20 @ 01:06) >  IMPRESSION: Diffuse low density appearance of the prostate which is increased in size; question underlying abscess. A pelvic MRI and urology consultation are recommended. INFECTIOUS DISEASE SERVICE INITIAL CONSULTATION NOTE    HPI:    64 year old M with DM2,  dyslipidemia, obesity, BPH, Orthostatic hypotension, GERD, HTN, Neuropathy, Obesity, HFpEF with mild LV diastolic dysfunction, and decompensated JEAN cirrhosis complicated by ascites, history of SBP, hepatic encephalopathy, and chronic anemia with a history of duodenal ulcer as well as GAVE and duodenal AVM s/p APC (last on 10/11/19), who is UNOS listed for liver transplantation at [facility] who presents with fall at home, general weakness and ? syncope.  Patient states that he was doing ok after dc from hospital and about Saturday 3/7, he started feeling generally weak and had diarrhea where he was moving his BM every 15 mns and his wife told him to hold the Lactulose which he did.  He was was having the constant urge to urinate .  However, yesterday , the Diarrhea stopped but he could not urinate.  Pt denies any nausea, vomiting, abd pain , no cough, no fever or chills or chest pain .  NO travel of sick contact.  NO visitors to his house ( of note, patient's youngest child in high school was told to stay at home for 14 days from school , not because of known exposure as per father .  It is because he did not go to school because he had some knee pain for one day as per father.    ED   :   Vitals are stable Pt received NS 500ml and one dose of Ertapenem due to abnormal urinalysis, blood and UCX were sent    Previous admissions :  20 - 3/03/20 for falls where SANTANA  was recommended but pt declined and was sent home .  Pt was on Lasix 40 mg daily alternating with 20mg which was changed to Lasix 20 mg oral daily and Midodrine increased from 20 mg oral THree times daily to 30 mg three times a day. (10 Mar 2020 15:41)    ID consulted for suspected UTI.   The patient reports that he has been having increased urination with pain, and horrible smelling urine. The urine has recently became a bit thick as well.   He denies fevers, chills, coughing, chest pain, nausea, vomiting, abdominal pain, or changes in his BMs.     PAST MEDICAL & SURGICAL HISTORY:  GIB (gastrointestinal bleeding)  GERD with esophagitis: Gastritis &amp; Non Bleeding Ulcers  Hepatic encephalopathy  Obesity  Fatty liver disease, nonalcoholic  Renal stones: 25 years ago  Hypertension  Neuropathy  Hypercholesteremia  Diabetes  S/P cholecystectomy      REVIEW OF SYSTEMS:  Constitutional: no weakness, no fevers, no chills  Dermatologic: no rash  Respiratory: no SOB, no cough  Cardiovascular: no chest pain, no palpitations  Gastrointestinal: no nausea, no vomiting, no diarrhea  Genitourinary: +dysuria, +urinary frequency, no hematuria, no urinary retention  Musculoskeletal:	no weakness, no joint swelling/pain  Neurological: no focal weakness or numbness  Endocrine: no polyuria, no polydipsia    ACTIVE ANTIMICROBIAL/ANTIBIOTIC MEDICATIONS:  ertapenem  IVPB 1000 milliGRAM(s) IV Intermittent every 24 hours  rifAXIMin 550 milliGRAM(s) Oral two times a day      OTHER MEDICATIONS:  albumin human 25% IVPB 100 milliLiter(s) IV Intermittent every 8 hours  dextrose 40% Gel 15 Gram(s) Oral once PRN  dextrose 5%. 1000 milliLiter(s) IV Continuous <Continuous>  dextrose 50% Injectable 12.5 Gram(s) IV Push once  dextrose 50% Injectable 25 Gram(s) IV Push once  folic acid 1 milliGRAM(s) Oral daily  glucagon  Injectable 1 milliGRAM(s) IntraMuscular once PRN  insulin glargine Injectable (LANTUS) 12 Unit(s) SubCutaneous at bedtime  insulin lispro (HumaLOG) corrective regimen sliding scale   SubCutaneous three times a day before meals  insulin lispro Injectable (HumaLOG) 6 Unit(s) SubCutaneous three times a day before meals  lactulose Syrup 20 Gram(s) Oral three times a day  magnesium sulfate  IVPB 1 Gram(s) IV Intermittent once  midodrine. 30 milliGRAM(s) Oral three times a day  ondansetron Injectable 4 milliGRAM(s) IV Push every 6 hours PRN  pantoprazole    Tablet 40 milliGRAM(s) Oral before breakfast  tamsulosin 0.4 milliGRAM(s) Oral at bedtime      ALLERGIES:  Allergies    codeine (Anaphylaxis)    Intolerances        SOCIAL HISTORY: No smoking, drugs, or alcohol. Lives in [place] with family.     FAMILY HISTORY:  Family history of type 2 diabetes mellitus  Family history of hypertension  Family history of stomach cancer      VITAL SIGNS:  ICU Vital Signs Last 24 Hrs  T(C): 37 (11 Mar 2020 04:05), Max: 37.1 (11 Mar 2020 00:03)  T(F): 98.6 (11 Mar 2020 04:05), Max: 98.8 (11 Mar 2020 00:03)  HR: 85 (11 Mar 2020 09:00) (77 - 91)  BP: 95/58 (11 Mar 2020 09:00) (95/58 - 148/68)  BP(mean): --  ABP: --  ABP(mean): --  RR: 18 (11 Mar 2020 09:00) (18 - 22)  SpO2: 98% (11 Mar 2020 09:00) (98% - 100%)      PHYSICAL EXAM:  Constitutional: Well appearing man lying comfortably in bed, jaundiced  Head: NC/AT  Eyes: sceral icterus  ENMT: no rhinorrhea; no oropharyngeal lesions, erythema, or exudates	  Neck: supple; no JVD or LAD  Respiratory: CTA B/L  Cardiovascular: +S1/S2, RRR; no appreciable murmurs  Gastrointestinal: soft, NT/ND; +BSx4, no HSM  Extremities: WWP; no clubbing, cyanosis, or edema  Dermatologic: small scrape on lateral right knee without drainage or surrounding erythema  Neurologic: AAOx3; no focal deficits    LABS:                        8.0    11.76 )-----------( 84       ( 11 Mar 2020 06:36 )             23.6     03-11    132<L>  |  94<L>  |  27<H>  ----------------------------<  212<H>  3.7   |  27  |  0.99    Ca    10.0      11 Mar 2020 06:33  Phos  2.5     03-11  Mg     1.7     03-11    TPro  6.1  /  Alb  2.7<L>  /  TBili  12.3<H>  /  DBili  x   /  AST  23  /  ALT  17  /  AlkPhos  117  03-11    PT/INR - ( 10 Mar 2020 12:06 )   PT: 22.4 sec;   INR: 1.91 ratio         PTT - ( 10 Mar 2020 12:06 )  PTT:34.2 sec  Urinalysis Basic - ( 10 Mar 2020 13:43 )    Color: Yellow / Appearance: Slightly Turbid / S.015 / pH: x  Gluc: x / Ketone: Negative  / Bili: Negative / Urobili: 3 mg/dL   Blood: x / Protein: Trace / Nitrite: Negative   Leuk Esterase: Large / RBC: 5 /hpf / WBC 35 /HPF   Sq Epi: x / Non Sq Epi: x / Bacteria: Many        MICROBIOLOGY:  Blood and urine cultures are pending    TRANSPLANT CLEARANCE LABWORK    Treponema Pallidum Antibody Interpretation: Negative (19 @ 08:33)  Treponema Pallidum Antibody Interpretation: Negative (19 @ 09:01)    Quantiferon TB Plus: NEGATIVE (19 @ 08:38)  Quantiferon TB Plus Nil: 0.16 IU/mL (19 @ 08:38)  Quantiferon TB Plus TB1 minus Nil: 0.00 IU/mL (19 @ 08:38)  Quantiferon TB Plus TB2 minus Nil: 0.01 IU/mL (19 @ 08:38)  Quantiferon TB Plus: NEGATIVE (19 08:51)  Quantiferon TB Plus Nil: 0.03 IU/mL (19 @ 08:51)  Quantiferon TB Plus TB1 minus Nil: 0.00 IU/mL (19 @ 08:51)  Quantiferon TB Plus TB2 minus Nil: 0.00 IU/mL (19 @ 08:51)     EBV VCA IgM EIA: <10.0 U/mL (19 @ 08:33)  EBV VCA IgG EIA: >750.0 U/mL (19 @ 08:33)  EBV EA Ab EIA: <5.0 U/mL (19 @ 08:33)     CMV IgG Antibody: <0.20 U/mL (19 @ 08:33)  CMV IgG Interpretation: Negative (19 @ 08:33)     Herpes simplex 1 IgG Ab Result: 37.20 Index (19 @ 08:33)  Herpes simplex 1 IgG Ab Interp: Positive (19 @ 08:33)  Herpes simplex 2 IgG Ab Result: 0.20 Index (19 @ 08:33)  Herpes simplex 2 IgG Ab Interp: Negative (19 @ 08:33)     Varicella IgG Antibody: 2083.0 Index (19 @ 08:33)  Varicella IgG Interpretation: Positive (19 @ 08:33)     Hepatitis A IgG Ab Result: Reactive (19 @ 08:33)     Hepatitis B Core Antibody, Total: Nonreact (19 @ 08:33)  Hepatitis B Surface Antibody: Nonreact (19 @ 08:33)  Hepatitis B Surface Antigen: Nonreact (19 08:33)  Hepatitis B Surface AB Quantitative: <3.0 mIU/mL (19 @ 08:33)  Hepatitis B Surface Antigen: Nonreact (19 @ 09:01)  Hepatitis B Core IgM Antibody: Nonreact (19 @ 09:01)  Hepatitis B Surface Antigen: Nonreact (19 @ 00:11)  Hepatitis B Core IgM Antibody: Nonreact (19 @ 00:11)  Hepatitis B Core IgM Antibody: Nonreact (10-23-19 @ 17:56)  Hepatitis B Surface Antigen: Nonreact (10-23-19 @ 17:56)       Hepatitis C RNA, Qualitative: NotDetec (19 @ 08:38)  Hepatitis C RNA Qualitative Interp: HCV RNA Qualitative assay by RT-PCR using [employer] [id number]    The limit of detection: 12 IU/mL.    Result Interpretation:  Not Detected:  HCV RNA is not detected; possibly indicates resolved past  infection or a false reactive antibody result.  Detected:        HCV RNA is detected; indicates hepatitis C virus  infection.  CDC guidelines and [facility] require confirmation of reactive  hepatitis C antibody screening tests. Results should be viewed in the  context of clinical history and other labtests. An indeterminate result  indicates that HCV RNA detection cannot be resolved, please consider  retesting with a new sample. The performance characteristics of this  assay have been determined by [facility] [employer] The U.S. Food and  Drug Administration (FDA) has not approved or cleared this test, however,  FDA clearance or approval is not currently required for clinical use. (19 @ 08:38)  Hepatitis C Virus S/CO Ratio: 0.14 S/CO (19 @ 08:33)  Hepatitis C Virus Interpretation: Nonreact (19 @ 08:33)  Hepatitis C Virus S/CO Ratio: 0.12 S/CO (19 @ 09:01)  Hepatitis C Virus Interpretation: Nonreact (19 @ 09:01)  Hepatitis C Virus S/CO Ratio: 0.16 S/CO (19 @ 00:11)  Hepatitis C Virus Interpretation: Nonreact (19 @ 00:11)  Hepatitis C Virus S/CO Ratio: 0.15 S/CO (10-23-19 @ 17:56)  Hepatitis C Virus Interpretation: Nonreact (10-23-19 @ 17:56)  Hepatitis C Virus S/CO Ratio: 0.14 S/CO (19 @ 16:27)  Hepatitis C Virus Interpretation: Nonreact (19 @ 16:27)     Mumps Virus IgG Antibody.: 236.0 AU/mL (19 @ 10:27)  Mumps Antibody Interpretation: Positive (19 @ 10:27)    Measles IgG Interpretation: Positive (19 @ 10:27)    Rubella Virus IgG Antibody.: 30.0 Index (19 @ 08:33)  Rubella IgG Interp: Positive (19 @ 08:33)    Toxoplasma IgG Screen: <3.0 IU/mL (19 @ 08:33)  Toxoplasma IgG Interpretation: Negative (19 @ 08:33)    Rapid HIV-1/2 Antibody (10.23.19 @ 05:08)    Rapid HIV-1/2 Antibody: Nonreact        RADIOLOGY & ADDITIONAL STUDIES:    <The imaging below has been reviewed and visualized by me independently. Findings as detailed in report below>    < from: CT Abdomen and Pelvis No Cont (20 @ 01:06) >  IMPRESSION: Diffuse low density appearance of the prostate which is increased in size; question underlying abscess. A pelvic MRI and urology consultation are recommended.

## 2020-03-11 NOTE — PROGRESS NOTE ADULT - PROBLEM SELECTOR PLAN 1
Hx of ESBL E. Coli.  Thick purulent fluid coming from bob catheter  Will continue Ertapenem, ID recs pending  F/U urine culture, continue bob for now

## 2020-03-12 LAB
-  AMIKACIN: SIGNIFICANT CHANGE UP
-  AMPICILLIN/SULBACTAM: SIGNIFICANT CHANGE UP
-  AMPICILLIN: SIGNIFICANT CHANGE UP
-  AZTREONAM: SIGNIFICANT CHANGE UP
-  CEFAZOLIN: SIGNIFICANT CHANGE UP
-  CEFEPIME: SIGNIFICANT CHANGE UP
-  CEFOXITIN: SIGNIFICANT CHANGE UP
-  CEFTRIAXONE: SIGNIFICANT CHANGE UP
-  CIPROFLOXACIN: SIGNIFICANT CHANGE UP
-  ERTAPENEM: SIGNIFICANT CHANGE UP
-  GENTAMICIN: SIGNIFICANT CHANGE UP
-  IMIPENEM: SIGNIFICANT CHANGE UP
-  LEVOFLOXACIN: SIGNIFICANT CHANGE UP
-  MEROPENEM: SIGNIFICANT CHANGE UP
-  NITROFURANTOIN: SIGNIFICANT CHANGE UP
-  PIPERACILLIN/TAZOBACTAM: SIGNIFICANT CHANGE UP
-  TIGECYCLINE: SIGNIFICANT CHANGE UP
-  TOBRAMYCIN: SIGNIFICANT CHANGE UP
-  TRIMETHOPRIM/SULFAMETHOXAZOLE: SIGNIFICANT CHANGE UP
ALBUMIN SERPL ELPH-MCNC: 2.9 G/DL — LOW (ref 3.3–5)
ALP SERPL-CCNC: 157 U/L — HIGH (ref 40–120)
ALT FLD-CCNC: 15 U/L — SIGNIFICANT CHANGE UP (ref 10–45)
ANION GAP SERPL CALC-SCNC: 11 MMOL/L — SIGNIFICANT CHANGE UP (ref 5–17)
APTT BLD: 43.7 SEC — HIGH (ref 27.5–36.3)
AST SERPL-CCNC: 19 U/L — SIGNIFICANT CHANGE UP (ref 10–40)
BASOPHILS # BLD AUTO: 0.14 K/UL — SIGNIFICANT CHANGE UP (ref 0–0.2)
BASOPHILS NFR BLD AUTO: 1.7 % — SIGNIFICANT CHANGE UP (ref 0–2)
BILIRUB SERPL-MCNC: 8.9 MG/DL — HIGH (ref 0.2–1.2)
BUN SERPL-MCNC: 21 MG/DL — SIGNIFICANT CHANGE UP (ref 7–23)
CALCIUM SERPL-MCNC: 9.6 MG/DL — SIGNIFICANT CHANGE UP (ref 8.4–10.5)
CHLORIDE SERPL-SCNC: 96 MMOL/L — SIGNIFICANT CHANGE UP (ref 96–108)
CO2 SERPL-SCNC: 26 MMOL/L — SIGNIFICANT CHANGE UP (ref 22–31)
CREAT SERPL-MCNC: 0.95 MG/DL — SIGNIFICANT CHANGE UP (ref 0.5–1.3)
CULTURE RESULTS: SIGNIFICANT CHANGE UP
EOSINOPHIL # BLD AUTO: 0.14 K/UL — SIGNIFICANT CHANGE UP (ref 0–0.5)
EOSINOPHIL NFR BLD AUTO: 1.7 % — SIGNIFICANT CHANGE UP (ref 0–6)
GLUCOSE BLDC GLUCOMTR-MCNC: 181 MG/DL — HIGH (ref 70–99)
GLUCOSE BLDC GLUCOMTR-MCNC: 200 MG/DL — HIGH (ref 70–99)
GLUCOSE BLDC GLUCOMTR-MCNC: 226 MG/DL — HIGH (ref 70–99)
GLUCOSE BLDC GLUCOMTR-MCNC: 246 MG/DL — HIGH (ref 70–99)
GLUCOSE BLDC GLUCOMTR-MCNC: 263 MG/DL — HIGH (ref 70–99)
GLUCOSE BLDC GLUCOMTR-MCNC: 287 MG/DL — HIGH (ref 70–99)
GLUCOSE SERPL-MCNC: 225 MG/DL — HIGH (ref 70–99)
HCT VFR BLD CALC: 23.1 % — LOW (ref 39–50)
HGB BLD-MCNC: 7.6 G/DL — LOW (ref 13–17)
INR BLD: 2.13 RATIO — HIGH (ref 0.88–1.16)
LACTATE SERPL-SCNC: 2.6 MMOL/L — HIGH (ref 0.7–2)
LYMPHOCYTES # BLD AUTO: 0.95 K/UL — LOW (ref 1–3.3)
LYMPHOCYTES # BLD AUTO: 11.3 % — LOW (ref 13–44)
MAGNESIUM SERPL-MCNC: 1.8 MG/DL — SIGNIFICANT CHANGE UP (ref 1.6–2.6)
MCHC RBC-ENTMCNC: 32.9 GM/DL — SIGNIFICANT CHANGE UP (ref 32–36)
MCHC RBC-ENTMCNC: 35.8 PG — HIGH (ref 27–34)
MCV RBC AUTO: 109 FL — HIGH (ref 80–100)
METHOD TYPE: SIGNIFICANT CHANGE UP
MONOCYTES # BLD AUTO: 0.08 K/UL — SIGNIFICANT CHANGE UP (ref 0–0.9)
MONOCYTES NFR BLD AUTO: 0.9 % — LOW (ref 2–14)
NEUTROPHILS # BLD AUTO: 6.9 K/UL — SIGNIFICANT CHANGE UP (ref 1.8–7.4)
NEUTROPHILS NFR BLD AUTO: 81.8 % — HIGH (ref 43–77)
ORGANISM # SPEC MICROSCOPIC CNT: SIGNIFICANT CHANGE UP
ORGANISM # SPEC MICROSCOPIC CNT: SIGNIFICANT CHANGE UP
PLATELET # BLD AUTO: 70 K/UL — LOW (ref 150–400)
POTASSIUM SERPL-MCNC: 3.7 MMOL/L — SIGNIFICANT CHANGE UP (ref 3.5–5.3)
POTASSIUM SERPL-SCNC: 3.7 MMOL/L — SIGNIFICANT CHANGE UP (ref 3.5–5.3)
PROT SERPL-MCNC: 5.8 G/DL — LOW (ref 6–8.3)
PROTHROM AB SERPL-ACNC: 24.9 SEC — HIGH (ref 10–13.1)
RBC # BLD: 2.12 M/UL — LOW (ref 4.2–5.8)
RBC # FLD: 15 % — HIGH (ref 10.3–14.5)
SODIUM SERPL-SCNC: 133 MMOL/L — LOW (ref 135–145)
SPECIMEN SOURCE: SIGNIFICANT CHANGE UP
WBC # BLD: 8.44 K/UL — SIGNIFICANT CHANGE UP (ref 3.8–10.5)
WBC # FLD AUTO: 8.44 K/UL — SIGNIFICANT CHANGE UP (ref 3.8–10.5)

## 2020-03-12 PROCEDURE — 99232 SBSQ HOSP IP/OBS MODERATE 35: CPT

## 2020-03-12 PROCEDURE — 99231 SBSQ HOSP IP/OBS SF/LOW 25: CPT

## 2020-03-12 PROCEDURE — 99233 SBSQ HOSP IP/OBS HIGH 50: CPT

## 2020-03-12 PROCEDURE — 74177 CT ABD & PELVIS W/CONTRAST: CPT | Mod: 26

## 2020-03-12 PROCEDURE — 99232 SBSQ HOSP IP/OBS MODERATE 35: CPT | Mod: GC

## 2020-03-12 RX ADMIN — Medication 50 MILLILITER(S): at 05:51

## 2020-03-12 RX ADMIN — Medication 50 MILLILITER(S): at 23:06

## 2020-03-12 RX ADMIN — Medication 4: at 13:47

## 2020-03-12 RX ADMIN — MIDODRINE HYDROCHLORIDE 30 MILLIGRAM(S): 2.5 TABLET ORAL at 17:45

## 2020-03-12 RX ADMIN — MEROPENEM 100 MILLIGRAM(S): 1 INJECTION INTRAVENOUS at 13:59

## 2020-03-12 RX ADMIN — INSULIN GLARGINE 12 UNIT(S): 100 INJECTION, SOLUTION SUBCUTANEOUS at 23:07

## 2020-03-12 RX ADMIN — PANTOPRAZOLE SODIUM 40 MILLIGRAM(S): 20 TABLET, DELAYED RELEASE ORAL at 06:37

## 2020-03-12 RX ADMIN — TAMSULOSIN HYDROCHLORIDE 0.4 MILLIGRAM(S): 0.4 CAPSULE ORAL at 23:07

## 2020-03-12 RX ADMIN — Medication 1 MILLIGRAM(S): at 13:49

## 2020-03-12 RX ADMIN — LACTULOSE 20 GRAM(S): 10 SOLUTION ORAL at 23:05

## 2020-03-12 RX ADMIN — CHLORHEXIDINE GLUCONATE 1 APPLICATION(S): 213 SOLUTION TOPICAL at 14:05

## 2020-03-12 RX ADMIN — Medication 2: at 09:01

## 2020-03-12 RX ADMIN — MEROPENEM 100 MILLIGRAM(S): 1 INJECTION INTRAVENOUS at 05:45

## 2020-03-12 RX ADMIN — Medication 50 MILLILITER(S): at 14:22

## 2020-03-12 RX ADMIN — Medication 6 UNIT(S): at 13:48

## 2020-03-12 RX ADMIN — Medication 0.5 MILLIGRAM(S): at 11:10

## 2020-03-12 RX ADMIN — MIDODRINE HYDROCHLORIDE 30 MILLIGRAM(S): 2.5 TABLET ORAL at 05:48

## 2020-03-12 RX ADMIN — MEROPENEM 100 MILLIGRAM(S): 1 INJECTION INTRAVENOUS at 23:07

## 2020-03-12 RX ADMIN — LACTULOSE 20 GRAM(S): 10 SOLUTION ORAL at 13:51

## 2020-03-12 RX ADMIN — MIDODRINE HYDROCHLORIDE 30 MILLIGRAM(S): 2.5 TABLET ORAL at 13:51

## 2020-03-12 RX ADMIN — Medication 6 UNIT(S): at 18:52

## 2020-03-12 RX ADMIN — Medication 6: at 17:45

## 2020-03-12 RX ADMIN — LACTULOSE 20 GRAM(S): 10 SOLUTION ORAL at 05:48

## 2020-03-12 RX ADMIN — Medication 6 UNIT(S): at 09:01

## 2020-03-12 NOTE — PROGRESS NOTE ADULT - ATTENDING COMMENTS
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. He has had multiple recent hospitalizations, including hospitalization from 2/11/20-2/21/20 due to septic shock secondary to emphysematous cystitis and ESBL E. coli bacteremia and from 2/28/20-3/3/20 after a mechanical fall. He is currently re-admitted after another mechanical fall likely preceded by vasovagal syncope, with evidence of urosepsis as well as mild hepatic encephalopathy and mild MISA.    # Urosepsis: Non-contrast CT (obtained due to recurrent infections including recent emphysematous cystitis) was concerning for prostate abscess and possible colovesicular fistula. Did not tolerate MRI pelvis today despite pre-medication with Ativan, therefore will obtain contrast-enhanced CT for further evaluation. Urology consulted, will follow-up recommendations. S/p ertapenem (3/10-11), now on meropenem (3/11- ) as per Transplant ID recommendations. Awaiting urine and blood culture results but has known history of ESBL E. coli infections.    # Hepatic encephalopathy: Improved. Continue rifaximin and lactulose.    # Mild, non-oliguric MISA: Improving, but recommend to continue IV albumin and hold diuretics today. Monitor I/Os.    # Orthostatic hypotension: Chronic, related to end-stage liver disease. Continue home midodrine 30 mg po tid.    # Hyponatremia: Currently still appears mildly hypovolemic. Continue IV albumin and holding diuretics for now, as above, and defer fluid restriction for now.    # Chronic anemia secondary to chronic GI blood loss: No recent overt bleeding. Hb/HCT stable compared to his baseline.    # MGUS: Hematology input appreciated, will follow-up SPEP/DELORES.    # Decompensated JEAN cirrhosis: He is currently UNOS wait-listed for liver transplantation at [facility], blood type A, with MELD-Na 27 (3/10/20), currently with calculated MELD-Na of 25 based on labs today. However, will need resolution of active infection prior to proceeding with transplantation and this may require prolonged antibiotics and/or surgical intervention pending further imaging/investigations.    Please don't hesitate to call with any questions/concerns.    Letitia Mo M.D., Ph.D.  Transplant Hepatology  Cell: [phone]

## 2020-03-12 NOTE — PROGRESS NOTE ADULT - SUBJECTIVE AND OBJECTIVE BOX
[facility]  Ramón Morales PA-C  [address] [place] [address]  [phone]      INTERVAL HPI/OVERNIGHT EVENTS:    patient s/e   events noted  going for MR    MEDICATIONS  (STANDING):  albumin human 25% IVPB 100 milliLiter(s) IV Intermittent every 8 hours  chlorhexidine 2% Cloths 1 Application(s) Topical daily  dextrose 5%. 1000 milliLiter(s) (50 mL/Hr) IV Continuous <Continuous>  dextrose 50% Injectable 12.5 Gram(s) IV Push once  dextrose 50% Injectable 25 Gram(s) IV Push once  folic acid 1 milliGRAM(s) Oral daily  insulin glargine Injectable (LANTUS) 12 Unit(s) SubCutaneous at bedtime  insulin lispro (HumaLOG) corrective regimen sliding scale   SubCutaneous three times a day before meals  insulin lispro Injectable (HumaLOG) 6 Unit(s) SubCutaneous three times a day before meals  lactulose Syrup 20 Gram(s) Oral three times a day  meropenem  IVPB 1000 milliGRAM(s) IV Intermittent every 8 hours  midodrine. 30 milliGRAM(s) Oral three times a day  pantoprazole    Tablet 40 milliGRAM(s) Oral before breakfast  rifAXIMin 550 milliGRAM(s) Oral two times a day  tamsulosin 0.4 milliGRAM(s) Oral at bedtime    MEDICATIONS  (PRN):  dextrose 40% Gel 15 Gram(s) Oral once PRN Blood Glucose LESS THAN 70 milliGRAM(s)/deciliter  glucagon  Injectable 1 milliGRAM(s) IntraMuscular once PRN Glucose LESS THAN 70 milligrams/deciliter  ondansetron Injectable 4 milliGRAM(s) IV Push every 6 hours PRN Nausea      Allergies    codeine (Anaphylaxis)    Intolerances        ROS:   General:  No wt loss, fevers, chills, night sweats, + fatigue,   Eyes:  Good vision, no reported pain  ENT:  No sore throat, pain, runny nose, dysphagia  CV:  No pain, palpitations, hypo/hypertension  Resp:  No dyspnea, cough, tachypnea, wheezing  GI:  No pain, No nausea, No vomiting, No diarrhea, No constipation, No weight loss, No fever, No pruritis, No rectal bleeding, No tarry stools, No dysphagia,  :  No pain, bleeding, incontinence, nocturia  Muscle:  No pain, weakness  Neuro:  No weakness, tingling, memory problems  Psych:  No fatigue, insomnia, mood problems, depression  Endocrine:  No polyuria, polydipsia, cold/heat intolerance  Heme:  No petechiae, ecchymosis, easy bruisability  Skin:  No rash, tattoos, scars, edema      PHYSICAL EXAM:   Vital Signs:  Vital Signs Last 24 Hrs  T(C): 36.6 (12 Mar 2020 12:06), Max: 36.8 (12 Mar 2020 04:12)  T(F): 97.9 (12 Mar 2020 12:06), Max: 98.2 (12 Mar 2020 04:12)  HR: 84 (12 Mar 2020 12:06) (80 - 92)  BP: 108/67 (12 Mar 2020 12:06) (102/57 - 118/64)  BP(mean): --  RR: 18 (12 Mar 2020 12:06) (17 - 18)  SpO2: 98% (12 Mar 2020 12:06) (98% - 100%)  Daily     Daily     GENERAL:  Appears stated age,   HEENT:  NC/AT,    CHEST:  Full & symmetric excursion,   HEART:  Regular rhythm,   ABDOMEN:  Soft, non-tender,  EXTEREMITIES: no edema  SKIN:  No rash  NEURO:  Alert        LABS:                        7.6    8.44  )-----------( 70       ( 12 Mar 2020 07:34 )             23.1     03-12    133<L>  |  96  |  21  ----------------------------<  225<H>  3.7   |  26  |  0.95    Ca    9.6      12 Mar 2020 07:34  Phos  2.5     03-11  Mg     1.8     -12    TPro  5.8<L>  /  Alb  2.9<L>  /  TBili  8.9<H>  /  DBili  x   /  AST  19  /  ALT  15  /  AlkPhos  157<H>  03-12    PT/INR - ( 12 Mar 2020 08:39 )   PT: 24.9 sec;   INR: 2.13 ratio         PTT - ( 12 Mar 2020 08:39 )  PTT:43.7 sec  Urinalysis Basic - ( 10 Mar 2020 13:43 )    Color: Yellow / Appearance: Slightly Turbid / S.015 / pH: x  Gluc: x / Ketone: Negative  / Bili: Negative / Urobili: 3 mg/dL   Blood: x / Protein: Trace / Nitrite: Negative   Leuk Esterase: Large / RBC: 5 /hpf / WBC 35 /HPF   Sq Epi: x / Non Sq Epi: x / Bacteria: Many        RADIOLOGY & ADDITIONAL TESTS:

## 2020-03-12 NOTE — PROGRESS NOTE ADULT - SUBJECTIVE AND OBJECTIVE BOX
INFECTIOUS DISEASE PROGRESS NOTE  Subjective:    VITALS  Vital Signs Last 24 Hrs  T(C): 36.8 (12 Mar 2020 04:12), Max: 36.8 (12 Mar 2020 04:12)  T(F): 98.2 (12 Mar 2020 04:12), Max: 98.2 (12 Mar 2020 04:12)  HR: 86 (12 Mar 2020 04:12) (80 - 92)  BP: 102/57 (12 Mar 2020 04:12) (102/57 - 118/64)  BP(mean): --  RR: 18 (12 Mar 2020 04:12) (17 - 18)  SpO2: 99% (12 Mar 2020 04:12) (99% - 100%)    I&O's Summary    11 Mar 2020 07:01  -  12 Mar 2020 07:00  --------------------------------------------------------  IN: 730 mL / OUT: 750 mL / NET: -20 mL    12 Mar 2020 07:01  -  12 Mar 2020 11:47  --------------------------------------------------------  IN: 240 mL / OUT: 0 mL / NET: 240 mL        CAPILLARY BLOOD GLUCOSE      POCT Blood Glucose.: 200 mg/dL (12 Mar 2020 08:54)  POCT Blood Glucose.: 246 mg/dL (11 Mar 2020 21:56)  POCT Blood Glucose.: 287 mg/dL (11 Mar 2020 17:46)  POCT Blood Glucose.: 254 mg/dL (11 Mar 2020 13:22)  POCT Blood Glucose.: 252 mg/dL (11 Mar 2020 12:14)      PHYSICAL EXAM  General: A&Ox 3; NAD  Eyes: PERRL; EOMI; anicteric sclera  Neck: Supple; no JVD  Respiratory: CTA B/L; no wheezes/crackles/rales auscultated w/ good air movement  Cardiovascular: Regular rhythm/rate; S1/S2; no gallops or murmurs auscultated  Gastrointestinal: Soft; NTND w/out rebound tenderness or guarding; bowel sounds normal  Extremities: WWP; no edema or cyanosis; radial/pedal pulses palpable  Neurological:  CNII-XII grossly intact; no obvious focal deficits    MEDICATIONS  (STANDING):  albumin human 25% IVPB 100 milliLiter(s) IV Intermittent every 8 hours  chlorhexidine 2% Cloths 1 Application(s) Topical daily  dextrose 5%. 1000 milliLiter(s) (50 mL/Hr) IV Continuous <Continuous>  dextrose 50% Injectable 12.5 Gram(s) IV Push once  dextrose 50% Injectable 25 Gram(s) IV Push once  folic acid 1 milliGRAM(s) Oral daily  insulin glargine Injectable (LANTUS) 12 Unit(s) SubCutaneous at bedtime  insulin lispro (HumaLOG) corrective regimen sliding scale   SubCutaneous three times a day before meals  insulin lispro Injectable (HumaLOG) 6 Unit(s) SubCutaneous three times a day before meals  lactulose Syrup 20 Gram(s) Oral three times a day  meropenem  IVPB 1000 milliGRAM(s) IV Intermittent every 8 hours  midodrine. 30 milliGRAM(s) Oral three times a day  pantoprazole    Tablet 40 milliGRAM(s) Oral before breakfast  rifAXIMin 550 milliGRAM(s) Oral two times a day  tamsulosin 0.4 milliGRAM(s) Oral at bedtime    MEDICATIONS  (PRN):  dextrose 40% Gel 15 Gram(s) Oral once PRN Blood Glucose LESS THAN 70 milliGRAM(s)/deciliter  glucagon  Injectable 1 milliGRAM(s) IntraMuscular once PRN Glucose LESS THAN 70 milligrams/deciliter  ondansetron Injectable 4 milliGRAM(s) IV Push every 6 hours PRN Nausea      LABS                        7.6    8.44  )-----------( 70       ( 12 Mar 2020 07:34 )             23.1     03-12    133<L>  |  96  |  21  ----------------------------<  225<H>  3.7   |  26  |  0.95    Ca    9.6      12 Mar 2020 07:34  Phos  2.5     03-11  Mg     1.8     03-12    TPro  5.8<L>  /  Alb  2.9<L>  /  TBili  8.9<H>  /  DBili  x   /  AST  19  /  ALT  15  /  AlkPhos  157<H>  03-12    LIVER FUNCTIONS - ( 12 Mar 2020 07:34 )  Alb: 2.9 g/dL / Pro: 5.8 g/dL / ALK PHOS: 157 U/L / ALT: 15 U/L / AST: 19 U/L / GGT: x           PT/INR - ( 12 Mar 2020 08:39 )   PT: 24.9 sec;   INR: 2.13 ratio         PTT - ( 12 Mar 2020 08:39 )  PTT:43.7 sec  Urinalysis Basic - ( 10 Mar 2020 13:43 )    Color: Yellow / Appearance: Slightly Turbid / S.015 / pH: x  Gluc: x / Ketone: Negative  / Bili: Negative / Urobili: 3 mg/dL   Blood: x / Protein: Trace / Nitrite: Negative   Leuk Esterase: Large / RBC: 5 /hpf / WBC 35 /HPF   Sq Epi: x / Non Sq Epi: x / Bacteria: Many      CARDIAC MARKERS ( 10 Mar 2020 12:06 )  x     / x     / 31 U/L / x     / x              Culture - Urine (collected 03-10-20 @ 17:23)  Source: .Urine Clean Catch (Midstream)  Preliminary Report (20 @ 16:18):    >100,000 CFU/ml Gram Negative Rods    Culture - Blood (collected 03-10-20 @ 16:54)  Source: .Blood Blood-Venous  Preliminary Report (20 @ 17:01):    No growth to date.    Culture - Blood (collected 03-10-20 @ 16:53)  Source: .Blood Blood-Peripheral  Preliminary Report (20 @ 17:01):    No growth to date.      Culture - Urine (collected 03-10-20 @ 17:23)  Source: .Urine Clean Catch (Midstream)  Preliminary Report (20 @ 16:18):    >100,000 CFU/ml Gram Negative Rods    Culture - Blood (collected 03-10-20 @ 16:54)  Source: .Blood Blood-Venous  Preliminary Report (20 @ 17:01):    No growth to date.    Culture - Blood (collected 03-10-20 @ 16:53)  Source: .Blood Blood-Peripheral  Preliminary Report (20 @ 17:01):    No growth to date. INFECTIOUS DISEASE PROGRESS NOTE  Subjective: Patient reports that he didn't sleep well last night, otherwise is ok. No fevers, chills, cough, chest pain, nausea, vomiting, abdominal pain, change in BMs.     VITALS  Vital Signs Last 24 Hrs  T(C): 36.8 (12 Mar 2020 04:12), Max: 36.8 (12 Mar 2020 04:12)  T(F): 98.2 (12 Mar 2020 04:12), Max: 98.2 (12 Mar 2020 04:12)  HR: 86 (12 Mar 2020 04:12) (80 - 92)  BP: 102/57 (12 Mar 2020 04:12) (102/57 - 118/64)  BP(mean): --  RR: 18 (12 Mar 2020 04:12) (17 - 18)  SpO2: 99% (12 Mar 2020 04:12) (99% - 100%)    I&O's Summary    11 Mar 2020 07:01  -  12 Mar 2020 07:00  --------------------------------------------------------  IN: 730 mL / OUT: 750 mL / NET: -20 mL    12 Mar 2020 07:01  -  12 Mar 2020 11:47  --------------------------------------------------------  IN: 240 mL / OUT: 0 mL / NET: 240 mL        CAPILLARY BLOOD GLUCOSE      POCT Blood Glucose.: 200 mg/dL (12 Mar 2020 08:54)  POCT Blood Glucose.: 246 mg/dL (11 Mar 2020 21:56)  POCT Blood Glucose.: 287 mg/dL (11 Mar 2020 17:46)  POCT Blood Glucose.: 254 mg/dL (11 Mar 2020 13:22)  POCT Blood Glucose.: 252 mg/dL (11 Mar 2020 12:14)      PHYSICAL EXAM  Constitutional: Well appearing man lying comfortably in bed, jaundiced  Head: NC/AT  Eyes: scleral icterus  ENMT: no rhinorrhea; no oropharyngeal lesions, erythema, or exudates	  Neck: supple; no JVD or LAD  Respiratory: CTA B/L  Cardiovascular: +S1/S2, RRR; no appreciable murmurs  Gastrointestinal: soft, NT/ND; +BSx4, no HSM  Extremities: WWP; no clubbing, cyanosis, or edema  Dermatologic: small scrape on lateral right knee without drainage or surrounding erythema  Neurologic: AAOx3; no focal deficits    MEDICATIONS  (STANDING):  albumin human 25% IVPB 100 milliLiter(s) IV Intermittent every 8 hours  chlorhexidine 2% Cloths 1 Application(s) Topical daily  dextrose 5%. 1000 milliLiter(s) (50 mL/Hr) IV Continuous <Continuous>  dextrose 50% Injectable 12.5 Gram(s) IV Push once  dextrose 50% Injectable 25 Gram(s) IV Push once  folic acid 1 milliGRAM(s) Oral daily  insulin glargine Injectable (LANTUS) 12 Unit(s) SubCutaneous at bedtime  insulin lispro (HumaLOG) corrective regimen sliding scale   SubCutaneous three times a day before meals  insulin lispro Injectable (HumaLOG) 6 Unit(s) SubCutaneous three times a day before meals  lactulose Syrup 20 Gram(s) Oral three times a day  meropenem  IVPB 1000 milliGRAM(s) IV Intermittent every 8 hours  midodrine. 30 milliGRAM(s) Oral three times a day  pantoprazole    Tablet 40 milliGRAM(s) Oral before breakfast  rifAXIMin 550 milliGRAM(s) Oral two times a day  tamsulosin 0.4 milliGRAM(s) Oral at bedtime    MEDICATIONS  (PRN):  dextrose 40% Gel 15 Gram(s) Oral once PRN Blood Glucose LESS THAN 70 milliGRAM(s)/deciliter  glucagon  Injectable 1 milliGRAM(s) IntraMuscular once PRN Glucose LESS THAN 70 milligrams/deciliter  ondansetron Injectable 4 milliGRAM(s) IV Push every 6 hours PRN Nausea      LABS                        7.6    8.44  )-----------( 70       ( 12 Mar 2020 07:34 )             23.1     03-12    133<L>  |  96  |  21  ----------------------------<  225<H>  3.7   |  26  |  0.95    Ca    9.6      12 Mar 2020 07:34  Phos  2.5     03-11  Mg     1.8     12    TPro  5.8<L>  /  Alb  2.9<L>  /  TBili  8.9<H>  /  DBili  x   /  AST  19  /  ALT  15  /  AlkPhos  157<H>  0312    LIVER FUNCTIONS - ( 12 Mar 2020 07:34 )  Alb: 2.9 g/dL / Pro: 5.8 g/dL / ALK PHOS: 157 U/L / ALT: 15 U/L / AST: 19 U/L / GGT: x           PT/INR - ( 12 Mar 2020 08:39 )   PT: 24.9 sec;   INR: 2.13 ratio         PTT - ( 12 Mar 2020 08:39 )  PTT:43.7 sec  Urinalysis Basic - ( 10 Mar 2020 13:43 )    Color: Yellow / Appearance: Slightly Turbid / S.015 / pH: x  Gluc: x / Ketone: Negative  / Bili: Negative / Urobili: 3 mg/dL   Blood: x / Protein: Trace / Nitrite: Negative   Leuk Esterase: Large / RBC: 5 /hpf / WBC 35 /HPF   Sq Epi: x / Non Sq Epi: x / Bacteria: Many      CARDIAC MARKERS ( 10 Mar 2020 12:06 )  x     / x     / 31 U/L / x     / x        Culture - Urine (collected 03-10-20 @ 17:23)  Source: .Urine Clean Catch (Midstream)  Preliminary Report (20 @ 16:18):    >100,000 CFU/ml Gram Negative Rods    Culture - Blood (collected 03-10-20 @ 16:54)  Source: .Blood Blood-Venous  Preliminary Report (20 @ 17:01):    No growth to date.    Culture - Blood (collected 03-10-20 @ 16:53)  Source: .Blood Blood-Peripheral  Preliminary Report (20 @ 17:01):    No growth to date.      Culture - Urine (collected 03-10-20 @ 17:23)  Source: .Urine Clean Catch (Midstream)  Preliminary Report (20 @ 16:18):    >100,000 CFU/ml Gram Negative Rods    Culture - Blood (collected 03-10-20 @ 16:54)  Source: .Blood Blood-Venous  Preliminary Report (20 @ 17:01):    No growth to date.    Culture - Blood (collected 03-10-20 @ 16:53)  Source: .Blood Blood-Peripheral  Preliminary Report (20 @ 17:01):    No growth to date. INFECTIOUS DISEASE PROGRESS NOTE  Subjective: Patient reports that he didn't sleep well last night, otherwise is ok. No fevers, chills, cough, chest pain, nausea, vomiting, abdominal pain, change in BMs.     VITALS  Vital Signs Last 24 Hrs  T(C): 36.8 (12 Mar 2020 04:12), Max: 36.8 (12 Mar 2020 04:12)  T(F): 98.2 (12 Mar 2020 04:12), Max: 98.2 (12 Mar 2020 04:12)  HR: 86 (12 Mar 2020 04:12) (80 - 92)  BP: 102/57 (12 Mar 2020 04:12) (102/57 - 118/64)  BP(mean): --  RR: 18 (12 Mar 2020 04:12) (17 - 18)  SpO2: 99% (12 Mar 2020 04:12) (99% - 100%)    I&O's Summary    11 Mar 2020 07:01  -  12 Mar 2020 07:00  --------------------------------------------------------  IN: 730 mL / OUT: 750 mL / NET: -20 mL    12 Mar 2020 07:01  -  12 Mar 2020 11:47  --------------------------------------------------------  IN: 240 mL / OUT: 0 mL / NET: 240 mL        CAPILLARY BLOOD GLUCOSE      POCT Blood Glucose.: 200 mg/dL (12 Mar 2020 08:54)  POCT Blood Glucose.: 246 mg/dL (11 Mar 2020 21:56)  POCT Blood Glucose.: 287 mg/dL (11 Mar 2020 17:46)  POCT Blood Glucose.: 254 mg/dL (11 Mar 2020 13:22)  POCT Blood Glucose.: 252 mg/dL (11 Mar 2020 12:14)      PHYSICAL EXAM  Constitutional: Well appearing man lying comfortably in bed, jaundiced  Head: NC/AT  Eyes: scleral icterus  ENMT: no rhinorrhea; no oropharyngeal lesions, erythema, or exudates	  Neck: supple; no JVD or LAD  Respiratory: CTA B/L  Cardiovascular: +S1/S2, RRR; no appreciable murmurs  Gastrointestinal: soft, NT/ND; +BSx4, no HSM  Extremities: WWP; no clubbing, cyanosis, or edema  Dermatologic: small scrape on lateral right knee without drainage or surrounding erythema  Neurologic: AAOx3; no focal deficits    MEDICATIONS  (STANDING):  albumin human 25% IVPB 100 milliLiter(s) IV Intermittent every 8 hours  chlorhexidine 2% Cloths 1 Application(s) Topical daily  dextrose 5%. 1000 milliLiter(s) (50 mL/Hr) IV Continuous <Continuous>  dextrose 50% Injectable 12.5 Gram(s) IV Push once  dextrose 50% Injectable 25 Gram(s) IV Push once  folic acid 1 milliGRAM(s) Oral daily  insulin glargine Injectable (LANTUS) 12 Unit(s) SubCutaneous at bedtime  insulin lispro (HumaLOG) corrective regimen sliding scale   SubCutaneous three times a day before meals  insulin lispro Injectable (HumaLOG) 6 Unit(s) SubCutaneous three times a day before meals  lactulose Syrup 20 Gram(s) Oral three times a day  meropenem  IVPB 1000 milliGRAM(s) IV Intermittent every 8 hours  midodrine. 30 milliGRAM(s) Oral three times a day  pantoprazole    Tablet 40 milliGRAM(s) Oral before breakfast  rifAXIMin 550 milliGRAM(s) Oral two times a day  tamsulosin 0.4 milliGRAM(s) Oral at bedtime    MEDICATIONS  (PRN):  dextrose 40% Gel 15 Gram(s) Oral once PRN Blood Glucose LESS THAN 70 milliGRAM(s)/deciliter  glucagon  Injectable 1 milliGRAM(s) IntraMuscular once PRN Glucose LESS THAN 70 milligrams/deciliter  ondansetron Injectable 4 milliGRAM(s) IV Push every 6 hours PRN Nausea      LABS                        7.6    8.44  )-----------( 70       ( 12 Mar 2020 07:34 )             23.1     03-12    133<L>  |  96  |  21  ----------------------------<  225<H>  3.7   |  26  |  0.95    Ca    9.6      12 Mar 2020 07:34  Phos  2.5     03-11  Mg     1.8         TPro  5.8<L>  /  Alb  2.9<L>  /  TBili  8.9<H>  /  DBili  x   /  AST  19  /  ALT  15  /  AlkPhos  157<H>  0312    LIVER FUNCTIONS - ( 12 Mar 2020 07:34 )  Alb: 2.9 g/dL / Pro: 5.8 g/dL / ALK PHOS: 157 U/L / ALT: 15 U/L / AST: 19 U/L / GGT: x           PT/INR - ( 12 Mar 2020 08:39 )   PT: 24.9 sec;   INR: 2.13 ratio         PTT - ( 12 Mar 2020 08:39 )  PTT:43.7 sec  Urinalysis Basic - ( 10 Mar 2020 13:43 )    Color: Yellow / Appearance: Slightly Turbid / S.015 / pH: x  Gluc: x / Ketone: Negative  / Bili: Negative / Urobili: 3 mg/dL   Blood: x / Protein: Trace / Nitrite: Negative   Leuk Esterase: Large / RBC: 5 /hpf / WBC 35 /HPF   Sq Epi: x / Non Sq Epi: x / Bacteria: Many      CARDIAC MARKERS ( 10 Mar 2020 12:06 )  x     / x     / 31 U/L / x     / x        MICROBIOLOGY:    Culture - Urine (collected 03-10-20 @ 17:23)  Source: .Urine Clean Catch (Midstream)  Preliminary Report (20 @ 16:18):    >100,000 CFU/ml Gram Negative Rods    Culture - Blood (collected 03-10-20 @ 16:54)  Source: .Blood Blood-Venous  Preliminary Report (20 @ 17:01):    No growth to date.    Culture - Blood (collected 03-10-20 @ 16:53)  Source: .Blood Blood-Peripheral  Preliminary Report (20 @ 17:01):    No growth to date.      Culture - Urine (collected 03-10-20 @ 17:23)  Source: .Urine Clean Catch (Midstream)  Preliminary Report (20 @ 16:18):    >100,000 CFU/ml Gram Negative Rods    Culture - Blood (collected 03-10-20 @ 16:54)  Source: .Blood Blood-Venous  Preliminary Report (20 @ 17:01):    No growth to date.    Culture - Blood (collected 03-10-20 @ 16:53)  Source: .Blood Blood-Peripheral  Preliminary Report (20 @ 17:01):    No growth to date.    RADIOLOGY & ADDITIONAL STUDIES:    <The imaging below has been reviewed and visualized by me independently. Findings as detailed in report below>    < from: CT Abdomen and Pelvis No Cont (20 @ 01:06) >  IMPRESSION: Diffuse low density appearance of the prostate which is increased in size; question underlying abscess. A pelvic MRI and urology consultation are recommended.

## 2020-03-12 NOTE — PROGRESS NOTE ADULT - PROBLEM SELECTOR PLAN 1
Hx of ESBL E. Coli.  Thick purulent fluid coming from bob catheter with large prostate on CT scan concerning for abscess  Will continue Meropenem per ID recs   F/U urine culture, continue bob for now  MRI pelvis to eval prostate and if abscess Urology consult

## 2020-03-12 NOTE — CONSULT NOTE ADULT - SUBJECTIVE AND OBJECTIVE BOX
HPI:  65y/o male with PMHx of DMT2, HLD, BPH, orthostatic hypotension, GERD, HTN, neuropathy, mild LV diastolic dysfunction, JEAN cirrhosis complicated by ascites, hepatic encephalopathy,  chronic anemia, currently listed for liver transplantation admitted s/p fall with 3 minute LOC at home. During workup CT scan showed diffuse low density appearance of the prostate concerning for abscess. Pt reports difficulty with urination yesterday. Beasley placed in ED drained 1L. He denies F/C/N/V, dysuria, urgency, frequency, gross hematuria, or rectal pain/pressure.     PAST MEDICAL & SURGICAL HISTORY:  GIB (gastrointestinal bleeding)  GERD with esophagitis: Gastritis &amp; Non Bleeding Ulcers  Hepatic encephalopathy  Obesity  Fatty liver disease, nonalcoholic  Renal stones: 25 years ago  Hypertension  Neuropathy  Hypercholesteremia  Diabetes  S/P cholecystectomy    FAMILY HISTORY:  Family history of type 2 diabetes mellitus  Family history of hypertension  Family history of stomach cancer    SOCIAL HISTORY:   Tobacco hx:  MEDICATIONS  (STANDING):  albumin human 25% IVPB 100 milliLiter(s) IV Intermittent every 8 hours  chlorhexidine 2% Cloths 1 Application(s) Topical daily  dextrose 5%. 1000 milliLiter(s) (50 mL/Hr) IV Continuous <Continuous>  dextrose 50% Injectable 12.5 Gram(s) IV Push once  dextrose 50% Injectable 25 Gram(s) IV Push once  folic acid 1 milliGRAM(s) Oral daily  insulin glargine Injectable (LANTUS) 12 Unit(s) SubCutaneous at bedtime  insulin lispro (HumaLOG) corrective regimen sliding scale   SubCutaneous three times a day before meals  insulin lispro Injectable (HumaLOG) 6 Unit(s) SubCutaneous three times a day before meals  lactulose Syrup 20 Gram(s) Oral three times a day  meropenem  IVPB 1000 milliGRAM(s) IV Intermittent every 8 hours  midodrine. 30 milliGRAM(s) Oral three times a day  pantoprazole    Tablet 40 milliGRAM(s) Oral before breakfast  rifAXIMin 550 milliGRAM(s) Oral two times a day  tamsulosin 0.4 milliGRAM(s) Oral at bedtime    MEDICATIONS  (PRN):  dextrose 40% Gel 15 Gram(s) Oral once PRN Blood Glucose LESS THAN 70 milliGRAM(s)/deciliter  glucagon  Injectable 1 milliGRAM(s) IntraMuscular once PRN Glucose LESS THAN 70 milligrams/deciliter  ondansetron Injectable 4 milliGRAM(s) IV Push every 6 hours PRN Nausea    Allergies    codeine (Anaphylaxis)    Intolerances    REVIEW OF SYSTEMS: Pertinent positives and negatives as stated in HPI, otherwise negative    Vital signs  T(C): 36.6 (03-12-20 @ 12:06), Max: 36.8 (03-12-20 @ 04:12)  HR: 84 (03-12-20 @ 12:06)  BP: 108/67 (03-12-20 @ 12:06)  SpO2: 98% (03-12-20 @ 12:06)    Physical Exam  Gen: NAD, jaundice   Abd: Soft, NT, ND  : Circumcised, no lesions.  No discharge or blood at urethral meatus.  Testes descended bilaterally.  Testes and epididymis nontender bilaterally.   Beasley draining cloudy yellow urine      LABS:    03-12 @ 07:34    WBC 8.44  / Hct 23.1  / SCr 0.95     03-11 @ 06:36    WBC 11.76 / Hct 23.6  / SCr --       03-12    133<L>  |  96  |  21  ----------------------------<  225<H>  3.7   |  26  |  0.95    Ca    9.6      12 Mar 2020 07:34  Phos  2.5     03-11  Mg     1.8     03-12    TPro  5.8<L>  /  Alb  2.9<L>  /  TBili  8.9<H>  /  DBili  x   /  AST  19  /  ALT  15  /  AlkPhos  157<H>  03-12    PT/INR - ( 12 Mar 2020 08:39 )   PT: 24.9 sec;   INR: 2.13 ratio         PTT - ( 12 Mar 2020 08:39 )  PTT:43.7 sec    Urine Cx:  Culture - Urine (03.10.20 @ 17:23)    Specimen Source: .Urine Clean Catch (Midstream)    Culture Results:   >100,000 CFU/ml Gram Negative Rods    Blood Cx: Culture - Blood (12.27.17 @ 21:29)    Specimen Source: .Blood Blood-Peripheral    Culture Results:   No growth at 5 days.    Radiology:  < from: CT Abdomen and Pelvis No Cont (03.11.20 @ 01:06) >  KIDNEYS/URETERS: Nonobstructing left renal calculi with the largest measuring 0.6 cm. No hydronephrosis.    BLADDER: Collapsed around Beasley catheter.  REPRODUCTIVE ORGANS: Diffuse low density appearance of the prostate measures 8.5 x 7.9 cm previously 6.4 x 5 cm.     BOWEL: No bowel obstruction. Appendix is normal.  PERITONEUM: Trace abdominal ascites.  VESSELS: Calcification of the aorta and its branches. Left upper quadrant varices.  RETROPERITONEUM/LYMPH NODES: Scattered mesenteric lymph nodes. Retroperitoneal collaterals also noted.  ABDOMINAL WALL: Within normal limits.  BONES: Degenerative changes.    IMPRESSION:     Diffuse low density appearance of the prostate which is increased in size; question underlying abscess. A pelvic MRI and urology consultation are recommended.    < end of copied text > HPI:  63y/o male with PMHx of DMT2, HLD, BPH, orthostatic hypotension, GERD, HTN, neuropathy, mild LV diastolic dysfunction, JEAN cirrhosis complicated by ascites, hepatic encephalopathy,  chronic anemia, currently listed for liver transplantation admitted s/p fall with 3 minute LOC at home. During workup CT scan showed diffuse low density appearance of the prostate concerning for abscess. Pt reports difficulty with urination yesterday. Beasley placed in ED drained 1L. He denies F/C/N/V, dysuria, urgency, frequency, gross hematuria, or rectal pain/pressure.   Pt was unable to tolerate pelvic MRI per primary team.       PAST MEDICAL & SURGICAL HISTORY:  GIB (gastrointestinal bleeding)  GERD with esophagitis: Gastritis &amp; Non Bleeding Ulcers  Hepatic encephalopathy  Obesity  Fatty liver disease, nonalcoholic  Renal stones: 25 years ago  Hypertension  Neuropathy  Hypercholesteremia  Diabetes  S/P cholecystectomy    FAMILY HISTORY:  Family history of type 2 diabetes mellitus  Family history of hypertension  Family history of stomach cancer    SOCIAL HISTORY:   Tobacco hx:  MEDICATIONS  (STANDING):  albumin human 25% IVPB 100 milliLiter(s) IV Intermittent every 8 hours  chlorhexidine 2% Cloths 1 Application(s) Topical daily  dextrose 5%. 1000 milliLiter(s) (50 mL/Hr) IV Continuous <Continuous>  dextrose 50% Injectable 12.5 Gram(s) IV Push once  dextrose 50% Injectable 25 Gram(s) IV Push once  folic acid 1 milliGRAM(s) Oral daily  insulin glargine Injectable (LANTUS) 12 Unit(s) SubCutaneous at bedtime  insulin lispro (HumaLOG) corrective regimen sliding scale   SubCutaneous three times a day before meals  insulin lispro Injectable (HumaLOG) 6 Unit(s) SubCutaneous three times a day before meals  lactulose Syrup 20 Gram(s) Oral three times a day  meropenem  IVPB 1000 milliGRAM(s) IV Intermittent every 8 hours  midodrine. 30 milliGRAM(s) Oral three times a day  pantoprazole    Tablet 40 milliGRAM(s) Oral before breakfast  rifAXIMin 550 milliGRAM(s) Oral two times a day  tamsulosin 0.4 milliGRAM(s) Oral at bedtime    MEDICATIONS  (PRN):  dextrose 40% Gel 15 Gram(s) Oral once PRN Blood Glucose LESS THAN 70 milliGRAM(s)/deciliter  glucagon  Injectable 1 milliGRAM(s) IntraMuscular once PRN Glucose LESS THAN 70 milligrams/deciliter  ondansetron Injectable 4 milliGRAM(s) IV Push every 6 hours PRN Nausea    Allergies    codeine (Anaphylaxis)    Intolerances    REVIEW OF SYSTEMS: Pertinent positives and negatives as stated in HPI, otherwise negative    Vital signs  T(C): 36.6 (03-12-20 @ 12:06), Max: 36.8 (03-12-20 @ 04:12)  HR: 84 (03-12-20 @ 12:06)  BP: 108/67 (03-12-20 @ 12:06)  SpO2: 98% (03-12-20 @ 12:06)    Physical Exam  Gen: NAD, jaundice   Abd: Soft, NT, ND  : Circumcised, no lesions.  No discharge or blood at urethral meatus.  Testes descended bilaterally.  Testes and epididymis nontender bilaterally.   Beasley draining cloudy yellow urine      LABS:    03-12 @ 07:34    WBC 8.44  / Hct 23.1  / SCr 0.95     03-11 @ 06:36    WBC 11.76 / Hct 23.6  / SCr --       03-12    133<L>  |  96  |  21  ----------------------------<  225<H>  3.7   |  26  |  0.95    Ca    9.6      12 Mar 2020 07:34  Phos  2.5     03-11  Mg     1.8     03-12    TPro  5.8<L>  /  Alb  2.9<L>  /  TBili  8.9<H>  /  DBili  x   /  AST  19  /  ALT  15  /  AlkPhos  157<H>  03-12    PT/INR - ( 12 Mar 2020 08:39 )   PT: 24.9 sec;   INR: 2.13 ratio         PTT - ( 12 Mar 2020 08:39 )  PTT:43.7 sec    Urine Cx:  Culture - Urine (03.10.20 @ 17:23)    Specimen Source: .Urine Clean Catch (Midstream)    Culture Results:   >100,000 CFU/ml Gram Negative Rods    Blood Cx: Culture - Blood (12.27.17 @ 21:29)    Specimen Source: .Blood Blood-Peripheral    Culture Results:   No growth at 5 days.    Radiology:  < from: CT Abdomen and Pelvis No Cont (03.11.20 @ 01:06) >  KIDNEYS/URETERS: Nonobstructing left renal calculi with the largest measuring 0.6 cm. No hydronephrosis.    BLADDER: Collapsed around Beasley catheter.  REPRODUCTIVE ORGANS: Diffuse low density appearance of the prostate measures 8.5 x 7.9 cm previously 6.4 x 5 cm.     BOWEL: No bowel obstruction. Appendix is normal.  PERITONEUM: Trace abdominal ascites.  VESSELS: Calcification of the aorta and its branches. Left upper quadrant varices.  RETROPERITONEUM/LYMPH NODES: Scattered mesenteric lymph nodes. Retroperitoneal collaterals also noted.  ABDOMINAL WALL: Within normal limits.  BONES: Degenerative changes.    IMPRESSION:     Diffuse low density appearance of the prostate which is increased in size; question underlying abscess. A pelvic MRI and urology consultation are recommended.    < end of copied text >

## 2020-03-12 NOTE — CONSULT NOTE ADULT - SUBJECTIVE AND OBJECTIVE BOX
Reason for consult:    HPI:  64M with DM2,  dyslipidemia, obesity, BPH, Orthostatic hypotension, GERD, HTN, Neuropathy, Obesity, HFpEF with mild LV diastolic dysfunction, and decompensated JEAN cirrhosis complicated by ascites, history of SBP, hepatic encephalopathy, and chronic anemia with a history of duodenal ulcer as well as GAVE and duodenal AVM s/p APC (last on 10/11/19), who is UNOS listed for liver transplantation at [facility] who presents with fall at home, general weakness and ? syncope.  Patient states that he was doing ok after dc from hospital and about Saturday 3/7, he started feeling generally weak and had diarrhea where he was moving his BM every 15 mns and his wife told him to hold the Lactulose which he did.  He was was having the constant urge to urinate .  However, yesterday , the Diarrhea stopped but he could not urinate.  Pt denies any nausea, vomiting, abd pain , no cough, no fever or chills or chest pain .  NO travel of sick contact.  NO visitors to his house ( of note, patient's youngest child in high school was told to stay at home for 14 days from school , not because of known exposure as per father .  It is because he did not go to school because he had some knee pain for one day as per father.      PAST MEDICAL & SURGICAL HISTORY:  GIB (gastrointestinal bleeding)  GERD with esophagitis: Gastritis &amp; Non Bleeding Ulcers  Hepatic encephalopathy  Obesity  Fatty liver disease, nonalcoholic  Renal stones: 25 years ago  Hypertension  Neuropathy  Hypercholesteremia  Diabetes  S/P cholecystectomy      FAMILY HISTORY:  Family history of type 2 diabetes mellitus  Family history of hypertension  Family history of stomach cancer      Alochol: Denied  Smoking: Nonsmoker  Drug Use: Denied  Marital Status:         Allergies    codeine (Anaphylaxis)    Intolerances        MEDICATIONS  (STANDING):  albumin human 25% IVPB 100 milliLiter(s) IV Intermittent every 8 hours  chlorhexidine 2% Cloths 1 Application(s) Topical daily  dextrose 5%. 1000 milliLiter(s) (50 mL/Hr) IV Continuous <Continuous>  dextrose 50% Injectable 12.5 Gram(s) IV Push once  dextrose 50% Injectable 25 Gram(s) IV Push once  folic acid 1 milliGRAM(s) Oral daily  insulin glargine Injectable (LANTUS) 12 Unit(s) SubCutaneous at bedtime  insulin lispro (HumaLOG) corrective regimen sliding scale   SubCutaneous three times a day before meals  insulin lispro Injectable (HumaLOG) 6 Unit(s) SubCutaneous three times a day before meals  lactulose Syrup 20 Gram(s) Oral three times a day  meropenem  IVPB 1000 milliGRAM(s) IV Intermittent every 8 hours  midodrine. 30 milliGRAM(s) Oral three times a day  pantoprazole    Tablet 40 milliGRAM(s) Oral before breakfast  rifAXIMin 550 milliGRAM(s) Oral two times a day  tamsulosin 0.4 milliGRAM(s) Oral at bedtime    MEDICATIONS  (PRN):  dextrose 40% Gel 15 Gram(s) Oral once PRN Blood Glucose LESS THAN 70 milliGRAM(s)/deciliter  glucagon  Injectable 1 milliGRAM(s) IntraMuscular once PRN Glucose LESS THAN 70 milligrams/deciliter  ondansetron Injectable 4 milliGRAM(s) IV Push every 6 hours PRN Nausea      ROS  No fever, sweats, chills  No epistaxis, HA, sore throat  No CP, SOB, cough, sputum  No n/v/d, abd pain, melena, hematochezia  No edema  No rash  No anxiety  No back pain, joint pain  No bleeding, bruising  No dysuria, hematuria    T(C): 36.6 (03-12-20 @ 12:06), Max: 36.8 (03-12-20 @ 04:12)  HR: 84 (03-12-20 @ 12:06) (84 - 92)  BP: 108/67 (03-12-20 @ 12:06) (102/57 - 113/67)  RR: 18 (03-12-20 @ 12:06) (17 - 18)  SpO2: 98% (03-12-20 @ 12:06) (98% - 99%)  Wt(kg): --    PE  NAD  Awake, alert  Anicteric, MMM  RRR  CTAB  Abd soft, NT, ND  No c/c/e  No rash grossly  FROM                          7.6    8.44  )-----------( 70       ( 12 Mar 2020 07:34 )             23.1       03-12    133<L>  |  96  |  21  ----------------------------<  225<H>  3.7   |  26  |  0.95    Ca    9.6      12 Mar 2020 07:34  Phos  2.5     03-11  Mg     1.8     03-12    TPro  5.8<L>  /  Alb  2.9<L>  /  TBili  8.9<H>  /  DBili  x   /  AST  19  /  ALT  15  /  AlkPhos  157<H>  03-12

## 2020-03-12 NOTE — PROGRESS NOTE ADULT - ATTENDING COMMENTS
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   Recent admission for ESBL E coli Bacteremia and UTI  Patient found to have pyuria on U/A and urine with visible thick sediment  CT A/P (reviewed with radiology) with significant interval increase in prostate size from last month (concerning for abscess)  Would continue meropenem pending ID of GNR on UCx  Patient sent for MRI (with Lorazepam for sedation) but did not tolerate test  I would pursue CT A/P with PO and IV contrast (evaluating for abscess in prostate and colovesicular/urethral fistulizing process)    Given prostatitis/UTI/abscess patient is not cleared from ID perspective at this time for OLT    Overall, UTI, Prostatitis, Leukocytosis, Hypotension, Positive Culture Finding (UCx), Pre-OLT    I will continue to follow. Please feel free to contact me with any further questions.    Eusebio Pérez M.D.  [facility] Division of Infectious Disease  8AM-5PM: Pager Number [phone]  After Hours (or if no response): Please contact the Infectious Diseases Office at [phone]     The above assessment and plan were discussed with Dr Yudy Santana (medicine hospitalist)

## 2020-03-12 NOTE — PROGRESS NOTE ADULT - ASSESSMENT
64M with DM2,  dyslipidemia, obesity, BPH, Orthostatic hypotension, GERD, HTN, Neuropathy, Obesity, HFpEF with mild LV diastolic dysfunction, and decompensated JEAN cirrhosis complicated by ascites, history of SBP, hepatic encephalopathy, and chronic anemia with a history of duodenal ulcer as well as GAVE and duodenal AVM s/p APC (last on 10/11/19), who is UNOS listed for liver transplantation at [facility] who presents with fall at home, general weakness and ? syncope in setting of UTI    - Syncope is most likely vagal in setting of urinary tract infection vs prostatic infection/abscess  - orthostatics yesterday notable for 30mmHg sbp drop  - Hold diuretics as not vol ol  - Midodrine increased to 30mg q8 but unclear how much more efficacy he will get from this dose.   - I doubt this was arrhythmic but his prolonged QTC has been concerning. This was seen on his previous admission as well  - Avoid QTC prolonging drugs  - please check daily EKGs, at least for now  - Monitor and replete electrolytes. Keep K>4.0 and Mg>2.0. Replete Mg today  - cont abx    - No signs of significant ischemia. Cath with non obs disease  - echo in 2/2020 with normal LV function . no need to repeat.   - F/U Gi     - Further cardiac workup will depend on clinical course.   - All other workup per primary team. Will followup.

## 2020-03-12 NOTE — PROGRESS NOTE ADULT - ASSESSMENT
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 He was found to have pyuria and leukocytosis. His urine is very thick, purulent appearing which is concerning for a colo-vesicular fistula. CTAP shows a large increase in the size of his prostate, which is concerning for abscess.   At this time, he is also listed for OLT.     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  - Contuinue meropenem 1g q 8 hours  - Check MRI AP with contrast to evaluate for prostate abscess  - Follow up cultures  - Monitor for fevers  - Trend WBCs    Pre OLT evaluation   CMV: negative  Toxo: negative  Hepatitis A: immune  Hepatitis B: not immune  Hepatitis C: negative  HIV: negative  HSV-1: positive  HSV-2: positive  Measles: immune  Mumps: immune  Rubella: immune  Quantiferon: negative  Varicella: immune    Vaccinations  - Prevnar given 12/2019  - Pneumovax given 2/2020  - Influenza given 11/2019    - Give hepatitis B vaccine (hemodialysis dose) this admission    Estefanía Henderson, PGY-4  Infectious Disease Fellow   Pager: [phone]  After 5pm/weekends: [phone]

## 2020-03-12 NOTE — PROGRESS NOTE ADULT - SUBJECTIVE AND OBJECTIVE BOX
PROGRESS NOTE:     Patient is a 64y old  Male who presents with a chief complaint of Syncopal episode at home (12 Mar 2020 11:47)      SUBJECTIVE / OVERNIGHT EVENTS: MARS    ADDITIONAL REVIEW OF SYSTEMS:  no fevers or chills  No nausea, vomiting or diarrhea    MEDICATIONS  (STANDING):  albumin human 25% IVPB 100 milliLiter(s) IV Intermittent every 8 hours  chlorhexidine 2% Cloths 1 Application(s) Topical daily  dextrose 5%. 1000 milliLiter(s) (50 mL/Hr) IV Continuous <Continuous>  dextrose 50% Injectable 12.5 Gram(s) IV Push once  dextrose 50% Injectable 25 Gram(s) IV Push once  folic acid 1 milliGRAM(s) Oral daily  insulin glargine Injectable (LANTUS) 12 Unit(s) SubCutaneous at bedtime  insulin lispro (HumaLOG) corrective regimen sliding scale   SubCutaneous three times a day before meals  insulin lispro Injectable (HumaLOG) 6 Unit(s) SubCutaneous three times a day before meals  lactulose Syrup 20 Gram(s) Oral three times a day  meropenem  IVPB 1000 milliGRAM(s) IV Intermittent every 8 hours  midodrine. 30 milliGRAM(s) Oral three times a day  pantoprazole    Tablet 40 milliGRAM(s) Oral before breakfast  rifAXIMin 550 milliGRAM(s) Oral two times a day  tamsulosin 0.4 milliGRAM(s) Oral at bedtime    MEDICATIONS  (PRN):  dextrose 40% Gel 15 Gram(s) Oral once PRN Blood Glucose LESS THAN 70 milliGRAM(s)/deciliter  glucagon  Injectable 1 milliGRAM(s) IntraMuscular once PRN Glucose LESS THAN 70 milligrams/deciliter  ondansetron Injectable 4 milliGRAM(s) IV Push every 6 hours PRN Nausea      CAPILLARY BLOOD GLUCOSE      POCT Blood Glucose.: 200 mg/dL (12 Mar 2020 08:54)  POCT Blood Glucose.: 246 mg/dL (11 Mar 2020 21:56)  POCT Blood Glucose.: 287 mg/dL (11 Mar 2020 17:46)  POCT Blood Glucose.: 254 mg/dL (11 Mar 2020 13:22)  POCT Blood Glucose.: 252 mg/dL (11 Mar 2020 12:14)    I&O's Summary    11 Mar 2020 07:01  -  12 Mar 2020 07:00  --------------------------------------------------------  IN: 730 mL / OUT: 750 mL / NET: -20 mL    12 Mar 2020 07:01  -  12 Mar 2020 12:00  --------------------------------------------------------  IN: 240 mL / OUT: 0 mL / NET: 240 mL        PHYSICAL EXAM:  Vital Signs Last 24 Hrs  T(C): 36.8 (12 Mar 2020 04:12), Max: 36.8 (12 Mar 2020 04:12)  T(F): 98.2 (12 Mar 2020 04:12), Max: 98.2 (12 Mar 2020 04:12)  HR: 86 (12 Mar 2020 04:12) (80 - 92)  BP: 102/57 (12 Mar 2020 04:12) (102/57 - 118/64)  BP(mean): --  RR: 18 (12 Mar 2020 04:12) (17 - 18)  SpO2: 99% (12 Mar 2020 04:12) (99% - 100%)    CONSTITUTIONAL: NAD, well-developed, obese, jaundice with scleral icterus   RESPIRATORY: Normal respiratory effort; lungs are clear to auscultation bilaterally  CARDIOVASCULAR: Regular rate and rhythm, normal S1 and S2, no murmur/rub/gallop; No lower extremity edema; Peripheral pulses are 2+ bilaterally  ABDOMEN: Nontender to palpation, normoactive bowel sounds, no rebound/guarding; No asterxis   : Beasley in place draining purulent urine  MUSCLOSKELETAL: no clubbing or cyanosis of digits; no joint swelling or tenderness to palpation  PSYCH: A+O to person, place, and time; affect appropriate    LABS:                        7.6    8.44  )-----------( 70       ( 12 Mar 2020 07:34 )             23.1     03-12    133<L>  |  96  |  21  ----------------------------<  225<H>  3.7   |  26  |  0.95    Ca    9.6      12 Mar 2020 07:34  Phos  2.5     03-11  Mg     1.8     03-12    TPro  5.8<L>  /  Alb  2.9<L>  /  TBili  8.9<H>  /  DBili  x   /  AST  19  /  ALT  15  /  AlkPhos  157<H>  03-12    PT/INR - ( 12 Mar 2020 08:39 )   PT: 24.9 sec;   INR: 2.13 ratio         PTT - ( 12 Mar 2020 08:39 )  PTT:43.7 sec  CARDIAC MARKERS ( 10 Mar 2020 12:06 )  x     / x     / 31 U/L / x     / x          Urinalysis Basic - ( 10 Mar 2020 13:43 )    Color: Yellow / Appearance: Slightly Turbid / S.015 / pH: x  Gluc: x / Ketone: Negative  / Bili: Negative / Urobili: 3 mg/dL   Blood: x / Protein: Trace / Nitrite: Negative   Leuk Esterase: Large / RBC: 5 /hpf / WBC 35 /HPF   Sq Epi: x / Non Sq Epi: x / Bacteria: Many        Culture - Urine (collected 10 Mar 2020 17:23)  Source: .Urine Clean Catch (Midstream)  Preliminary Report (11 Mar 2020 16:18):    >100,000 CFU/ml Gram Negative Rods    Culture - Blood (collected 10 Mar 2020 16:54)  Source: .Blood Blood-Venous  Preliminary Report (11 Mar 2020 17:01):    No growth to date.    Culture - Blood (collected 10 Mar 2020 16:53)  Source: .Blood Blood-Peripheral  Preliminary Report (11 Mar 2020 17:01):    No growth to date.        RADIOLOGY & ADDITIONAL TESTS:  Results Reviewed:   Imaging Personally Reviewed:  Electrocardiogram Personally Reviewed:    COORDINATION OF CARE:  Care Discussed with Consultants/Other Providers [Y/N]:  Prior or Outpatient Records Reviewed [Y/N]:

## 2020-03-12 NOTE — PROGRESS NOTE ADULT - PROBLEM SELECTOR PLAN 6
Midodrine 30 mg oral 3times /day   will repeat serum cortisol as last cortisol in 12/3/19 was 6.6   Support stocking / will cont DASH diet for now

## 2020-03-12 NOTE — PROGRESS NOTE ADULT - PROBLEM SELECTOR PLAN 2
Severe sepsis with elevated lactate in setting of infection and severe liver disease  Blood cultures NGTD and urine culture GNR

## 2020-03-12 NOTE — PROGRESS NOTE ADULT - ASSESSMENT
64M with DM2,  dyslipidemia, obesity, BPH, Orthostatic hypotension, GERD, HTN, Neuropathy, Obesity, HFpEF with mild LV diastolic dysfunction, and decompensated JEAN cirrhosis complicated by ascites, history of SBP, hepatic encephalopathy, and chronic anemia with a history of duodenal ulcer as well as GAVE and duodenal AVM s/p APC (last on 10/11/19), who is UNOS listed for liver transplantation at [facility] who presents with fall at home, general weakness and ? syncope 2/2 sepsis from UTI suspected prostatitis.

## 2020-03-12 NOTE — PROGRESS NOTE ADULT - PROBLEM SELECTOR PLAN 3
General with urinary symptoms with leucocytosis and abnormal urinalysis   Prolonged QTC, cardio following for this will avoid prolonging medications  Daily EKG  Repeat Orthostatic BP and give albumin bolus until improvement. Still orthostatic yesterday.

## 2020-03-12 NOTE — PROGRESS NOTE ADULT - ASSESSMENT
64M with DM2,  dyslipidemia, obesity, BPH, Orthostatic hypotension, GERD, HTN, Neuropathy, Obesity, HFpEF with mild LV diastolic dysfunction, and decompensated JEAN cirrhosis complicated by ascites, history of SBP, hepatic encephalopathy, and chronic anemia with a history of duodenal ulcer as well as GAVE and duodenal AVM s/p APC (last on 10/11/19), who is UNOS listed for liver transplantation at [facility] who presents with fall at home, general weakness and ? syncope 2/2 sepsis from UTI suspected prostatitis.    #JEAN Cirrhosis: UNOS listed for liver transplant. MELD-Na 25 3/12  Decompensated by esophageal varices, ascites, h/o hepatic encephalopathy and h/o SBP.   -Ascites: Trace on CT abdomen/pelvis from 02/11/20  -Varices: Small non-bleeding EV on EGD from 12/2019.  -HCC: No focal liver lesions on MRI abdomen 12/2019.   -PSE: No asterixis.     #Recurrent urosepsis  Emphesematous cystitis seen on during prior hospitalization with hx of ESBL Ecoli. CT scan during this admission showing enlarged prostate concerning for abscess. There is concern for possible colovesicular fistula. Pending MRI pelvis.  Bcx from 3/10 negative  Ucx from 3/10 showing >100,000 GNR      #Recurrent falls: Differential includes deconditioning vs. diabetic neuropathy. Cannot rule out infectious work up  Previous work up from previous hospitalization:    -TTE unremarkable this admission, less likely cardiac cause. CT head without acute neurologic findings.   -Mental status at baseline on lactulose    Recommendations:  - cw with abx  - f/u MRI pelvis  - Hold diuretics for now, cw albumin 25% 100 mL iv q8h  - f/u speciation for Ucx  - Continue with midodrine 30mg TID  - Continue Lactulose and Rifaximin

## 2020-03-12 NOTE — CONSULT NOTE ADULT - ASSESSMENT
63y/o male with urinary retention, GNR UTI, suspected prostate abscess    - Continue abx per ID recommendations   - Follow up cultures  - Keep bob and monitor urine output   - Continue flomax     ***Final plan to be discussed with attending 65y/o male with urinary retention, GNR UTI, with CT findings of enlarged prostate, posterior component increased from prior (grossly normal 12/19).  DDx is prostate abscess vs. malignancy vs. hematoma.      - Patient unable to tolerate MRI, please obtain CT with and without IV contrast to better elucidate collection.  If a fluid collection is identified, recommend drainage - possible IR for transrectal drain placement  - Continue abx per ID recommendations   - Follow up cultures  - Keep bob and monitor urine output   - Continue flomax

## 2020-03-12 NOTE — PROGRESS NOTE ADULT - REASON FOR ADMISSION
Syncopal episode at home at 0430 this am, LOC for approx 3 minutes per wife, pt had episode of shaking during that time. Hx of multiple falls. Pt reporting abdominal pain for X 1day, no nausea or vomiting, endorses loose stool on lactulose at home Syncopal episode at home

## 2020-03-12 NOTE — PROGRESS NOTE ADULT - ASSESSMENT
decompensated cirrhosis  frequent falls  UTI  anemia    CT scan noted  urine culture positive  blood remains negative  for MRI today to further assess for prostatic abscess and possible fistula  urology eval  transplant hepatology input much appreciated  IV antibiotics per ID

## 2020-03-12 NOTE — PROGRESS NOTE ADULT - SUBJECTIVE AND OBJECTIVE BOX
[facility]    Sushila Dhaliwal, Doug, Morenita, Ayo, Clay, Colleen      [phone]    CHIEF COMPLAINT: Patient is a 64y old  Male who presents with a chief complaint of Syncope (11 Mar 2020 16:11)      Follow Up: cirrhosis, hypotension, hfpef    Interim history: The patient reports no new symptoms.  Denies chest discomfort and shortness of breath.  No abdominal pain.  No new neurologic symptoms.      MEDICATIONS  (STANDING):  albumin human 25% IVPB 100 milliLiter(s) IV Intermittent every 8 hours  chlorhexidine 2% Cloths 1 Application(s) Topical daily  dextrose 5%. 1000 milliLiter(s) (50 mL/Hr) IV Continuous <Continuous>  dextrose 50% Injectable 12.5 Gram(s) IV Push once  dextrose 50% Injectable 25 Gram(s) IV Push once  folic acid 1 milliGRAM(s) Oral daily  insulin glargine Injectable (LANTUS) 12 Unit(s) SubCutaneous at bedtime  insulin lispro (HumaLOG) corrective regimen sliding scale   SubCutaneous three times a day before meals  insulin lispro Injectable (HumaLOG) 6 Unit(s) SubCutaneous three times a day before meals  lactulose Syrup 20 Gram(s) Oral three times a day  meropenem  IVPB 1000 milliGRAM(s) IV Intermittent every 8 hours  midodrine. 30 milliGRAM(s) Oral three times a day  pantoprazole    Tablet 40 milliGRAM(s) Oral before breakfast  rifAXIMin 550 milliGRAM(s) Oral two times a day  tamsulosin 0.4 milliGRAM(s) Oral at bedtime    MEDICATIONS  (PRN):  dextrose 40% Gel 15 Gram(s) Oral once PRN Blood Glucose LESS THAN 70 milliGRAM(s)/deciliter  glucagon  Injectable 1 milliGRAM(s) IntraMuscular once PRN Glucose LESS THAN 70 milligrams/deciliter  ondansetron Injectable 4 milliGRAM(s) IV Push every 6 hours PRN Nausea      REVIEW OF SYSTEMS:  eye, ent, GI, , allergic, dermatologic, musculoskeletal and neurologic are negative except as described above    Vital Signs Last 24 Hrs  T(C): 36.8 (12 Mar 2020 04:12), Max: 36.8 (12 Mar 2020 04:12)  T(F): 98.2 (12 Mar 2020 04:12), Max: 98.2 (12 Mar 2020 04:12)  HR: 86 (12 Mar 2020 04:12) (80 - 92)  BP: 102/57 (12 Mar 2020 04:12) (102/57 - 118/64)  BP(mean): --  RR: 18 (12 Mar 2020 04:12) (17 - 18)  SpO2: 99% (12 Mar 2020 04:12) (99% - 100%)    I&O's Summary    11 Mar 2020 07:01  -  12 Mar 2020 07:00  --------------------------------------------------------  IN: 730 mL / OUT: 750 mL / NET: -20 mL    12 Mar 2020 07:01  -  12 Mar 2020 09:44  --------------------------------------------------------  IN: 240 mL / OUT: 0 mL / NET: 240 mL        Telemetry past 24h:    PHYSICAL EXAM:    Constitutional: well-nourished, well-developed, NAD   HEENT:  MMM, sclerae anicteric, conjunctivae clear, no oral cyanosis.  Pulmonary: Non-labored, breath sounds are clear bilaterally, No wheezing, rales or rhonchi  Cardiovascular: Regular, S1 and S2.  1/6 sys murmur.  No rubs, gallops or clicks  Gastrointestinal: Bowel Sounds present, soft, nontender.   Lymph: No peripheral edema.   Neurological: Alert, no focal deficits  Skin: No rashes.  Psych:  Mood & affect appropriate    LABS: All Labs Reviewed:                        7.6    8.44  )-----------( 70       ( 12 Mar 2020 07:34 )             23.1                         8.0    11.76 )-----------( 84       ( 11 Mar 2020 06:36 )             23.6                         9.7    15.75 )-----------( 111      ( 10 Mar 2020 12:06 )             28.0     12 Mar 2020 07:34    133    |  96     |  21     ----------------------------<  225    3.7     |  26     |  0.95   11 Mar 2020 06:33    132    |  94     |  27     ----------------------------<  212    3.7     |  27     |  0.99   10 Mar 2020 12:06    132    |  94     |  29     ----------------------------<  209    4.0     |  23     |  1.15     Ca    9.6        12 Mar 2020 07:34  Ca    10.0       11 Mar 2020 06:33  Ca    10.7       10 Mar 2020 12:06  Phos  2.5       11 Mar 2020 06:33  Mg     1.8       12 Mar 2020 07:34  Mg     1.7       11 Mar 2020 06:33    TPro  5.8    /  Alb  2.9    /  TBili  8.9    /  DBili  x      /  AST  19     /  ALT  15     /  AlkPhos  157    12 Mar 2020 07:34  TPro  6.1    /  Alb  2.7    /  TBili  12.3   /  DBili  x      /  AST  23     /  ALT  17     /  AlkPhos  117    11 Mar 2020 06:33  TPro  7.1    /  Alb  3.0    /  TBili  14.1   /  DBili  x      /  AST  28     /  ALT  20     /  AlkPhos  154    10 Mar 2020 12:06    PT/INR - ( 10 Mar 2020 12:06 )   PT: 22.4 sec;   INR: 1.91 ratio         PTT - ( 10 Mar 2020 12:06 )  PTT:34.2 sec  CARDIAC MARKERS ( 10 Mar 2020 12:06 )  x     / x     / 31 U/L / x     / x          Blood Culture: Organism --  Gram Stain Blood -- Gram Stain --  Specimen Source .Urine Clean Catch (Midstream)  Culture-Blood --    Organism --  Gram Stain Blood -- Gram Stain --  Specimen Source .Blood Blood-Venous  Culture-Blood --    Organism --  Gram Stain Blood -- Gram Stain --  Specimen Source .Blood Blood-Peripheral  Culture-Blood --        03-11 @ 12:37  TSH: 0.97      RADIOLOGY:    EKG:    Echo:    < from: TTE with Doppler (w/Cont) (02.29.20 @ 14:36) >    Patient name: JESSICA MARTIN  YOB: 1955   Age: 64 (M)   MR#: [id number]  Study Date: 2/29/2020  [facility]  Study quality: Technically difficult  Referring Physician: Cary Rivera MD  Blood Pressure: 122/64 mmHg  Height: 185 cm  Weight: 111 kg  BSA: 2.3 m2  ------------------------------------------------------------------------  PROCEDURE: Transthoracic echocardiogram with 2-D, M-Mode  and complete spectral and color flow Doppler. Verbal  consent was obtained for injection of  Ultrasonic Enhancing  Agent following a discussion of risks and benefits.  Following intravenous injection of Ultrasonic Enhancing  Agent , harmonic imaging was performed.  INDICATION: Pericardial effusion (noninflammatory) (I31.3)  ------------------------------------------------------------------------  Dimensions:    Normal Values:  LA:     4.2    2.0 - 4.0 cm  Ao:     3.7    2.0 - 3.8 cm  SEPTUM: 0.8    0.6 - 1.2 cm  PWT:    0.8    0.6 - 1.1 cm  LVIDd:  5.5    3.0 - 5.6 cm  LVIDs:  2.8    1.8 - 4.0 cm  Derived variables:  LVMI: 68 g/m2  RWT: 0.29  Fractional short: 49 %  EF (Visual Estimate): 70 %  Doppler Peak Velocity (m/sec): AoV=1.9  ------------------------------------------------------------------------  Observations:  Mitral Valve: Normal mitral valve. Mitral annular  calcification.  Aortic Valve/Aorta: Calcified aortic valve with normal  opening.  Normal aortic root size.  Left Atrium: Mildly dilated left atrium.  Left Ventricle: Endocardial visualization enhanced with  intravenous injection of Ultrasonic Enhancing Agent  (Definity).  Normal left ventricular internal dimensions and wall  thicknesses.  Normal left ventricular systolic function. No segmental  wall motion abnormalities.  Normal diastolic function.  Right Heart: Right atrium not well visualized. The right  ventricle is not well visualized. Normal tricuspid valve.  Normal pulmonic valve.  Pericardium/Pleura: Pericardial fat pad noted. No  pericardial effusion.  Hemodynamic: No evidence of pulmonary hypertension.  ------------------------------------------------------------------------  Conclusions:  Endocardial visualization enhanced with intravenous  injection of Ultrasonic Enhancing Agent (Definity).  Normal left ventricular systolic function. No segmental  wall motion abnormalities.  ------------------------------------------------------------------------  Confirmed on  2/29/2020 - 17:07:16 by Earl Plummer MD, FASE  ------------------------------------------------------------------------    < end of copied text >

## 2020-03-12 NOTE — CONSULT NOTE ADULT - ASSESSMENT
65yo M with IDDM, dyslipidemia, obesity, GERD, HFpEF with mild LV diastolic dysfunction, and decompensated JEAN cirrhosis complicated by ascites, history of SBP, hepatic encephalopathy, and chronic anemia with a history of duodenal ulcer as well as GAVE and duodenal AVM s/p APC (last on 10/11/19), who is UNOS listed for liver transplantation at [facility] who presents with fall at home. Hematology consulted for his hx of anemia/thrombocytopenia       Anemia -- had extensive w/u in the past. [place] to be related to ACD. Also with MGUS  -- hgb adequate  -- transfuse for hgb <7  -- monitor CBC    MGUS -- check SPEP/DELORES    thrombocytopenia -- chronic and stable, likely due to cirrhosis  -- can be exacerbated by acute events  -- monitor CBC  -- transfuse for plts <10 if no bleeding, <50 if with bleeding    GNR/UTI  -f/u ID, urology recs, ?prostate abscess    Thank you for the courtesy of this consultation and we will continue to follow.    Juan Carlos Rudd MD  [facility]  Cell: [phone]

## 2020-03-12 NOTE — PROGRESS NOTE ADULT - SUBJECTIVE AND OBJECTIVE BOX
Chief Complaint:  Patient is a 64y old  Male who presents with a chief complaint of Syncopal episode at home at 0430 this am, LOC for approx 3 minutes per wife, pt had episode of shaking during that time. Hx of multiple falls. Pt reporting abdominal pain for X 1day, no nausea or vomiting, endorses loose stool on lactulose at home (12 Mar 2020 12:28)      Interval Events:     Allergies:  codeine (Anaphylaxis)      Home Medications:    Hospital Medications:  albumin human 25% IVPB 100 milliLiter(s) IV Intermittent every 8 hours  chlorhexidine 2% Cloths 1 Application(s) Topical daily  dextrose 40% Gel 15 Gram(s) Oral once PRN  dextrose 5%. 1000 milliLiter(s) IV Continuous <Continuous>  dextrose 50% Injectable 12.5 Gram(s) IV Push once  dextrose 50% Injectable 25 Gram(s) IV Push once  folic acid 1 milliGRAM(s) Oral daily  glucagon  Injectable 1 milliGRAM(s) IntraMuscular once PRN  insulin glargine Injectable (LANTUS) 12 Unit(s) SubCutaneous at bedtime  insulin lispro (HumaLOG) corrective regimen sliding scale   SubCutaneous three times a day before meals  insulin lispro Injectable (HumaLOG) 6 Unit(s) SubCutaneous three times a day before meals  lactulose Syrup 20 Gram(s) Oral three times a day  meropenem  IVPB 1000 milliGRAM(s) IV Intermittent every 8 hours  midodrine. 30 milliGRAM(s) Oral three times a day  ondansetron Injectable 4 milliGRAM(s) IV Push every 6 hours PRN  pantoprazole    Tablet 40 milliGRAM(s) Oral before breakfast  rifAXIMin 550 milliGRAM(s) Oral two times a day  tamsulosin 0.4 milliGRAM(s) Oral at bedtime      PMHX/PSHX:  GIB (gastrointestinal bleeding)  GERD with esophagitis  Hepatic encephalopathy  Obesity  Fatty liver disease, nonalcoholic  Renal stones  Hypertension  Neuropathy  Hypercholesteremia  Diabetes  S/P cholecystectomy  No significant past surgical history      Family history:  Family history of type 2 diabetes mellitus  Family history of hypertension  Family history of stomach cancer  No pertinent family history in first degree relatives      ROS:     General:  No wt loss, fevers, chills, night sweats, fatigue,   Eyes:  Good vision, no reported pain  ENT:  No sore throat, pain, runny nose, dysphagia  CV:  No pain, palpitations, hypo/hypertension  Resp:  No dyspnea, cough, tachypnea, wheezing  GI:  No pain, No nausea, No vomiting, No diarrhea, No constipation, No weight loss, No fever, No pruritis, No rectal bleeding, No tarry stools, No dysphagia,  :  No pain, bleeding, incontinence, nocturia  Muscle:  No pain, weakness  Neuro:  No weakness, tingling, memory problems  Psych:  No fatigue, insomnia, mood problems, depression  Endocrine:  No polyuria, polydipsia, cold/heat intolerance  Heme:  No petechiae, ecchymosis, easy bruisability  Skin:  No rash, tattoos, scars, edema      PHYSICAL EXAM:   Vital Signs:  Vital Signs Last 24 Hrs  T(C): 36.6 (12 Mar 2020 12:06), Max: 36.8 (12 Mar 2020 04:12)  T(F): 97.9 (12 Mar 2020 12:06), Max: 98.2 (12 Mar 2020 04:12)  HR: 84 (12 Mar 2020 12:06) (80 - 92)  BP: 108/67 (12 Mar 2020 12:06) (102/57 - 118/64)  BP(mean): --  RR: 18 (12 Mar 2020 12:06) (17 - 18)  SpO2: 98% (12 Mar 2020 12:06) (98% - 100%)  Daily     Daily     GENERAL:  Appears stated age, well-groomed  HEENT:  NC/AT,  conjunctivae clear and pink, no thyromegaly, +sclera icteric  CHEST:  Full & symmetric excursion, no increased effort, breath sounds clear  HEART:  Regular rhythm, S1, S2, no murmur/rub/S3/S4  ABDOMEN:  Soft, non-tender, +central adiposity, non-distended, normoactive bowel sounds  EXTEREMITIES:  no cyanosis,clubbing or edema  SKIN:  No rash/erythema/ecchymoses, +jaundice  NEURO:  Alert, oriented x4, +faint asterixis    LABS:                        7.6    8.44  )-----------( 70       ( 12 Mar 2020 07:34 )             23.1     03-12    133<L>  |  96  |  21  ----------------------------<  225<H>  3.7   |  26  |  0.95    Ca    9.6      12 Mar 2020 07:34  Phos  2.5     03-11  Mg     1.8     03-12    TPro  5.8<L>  /  Alb  2.9<L>  /  TBili  8.9<H>  /  DBili  x   /  AST  19  /  ALT  15  /  AlkPhos  157<H>  03-12    LIVER FUNCTIONS - ( 12 Mar 2020 07:34 )  Alb: 2.9 g/dL / Pro: 5.8 g/dL / ALK PHOS: 157 U/L / ALT: 15 U/L / AST: 19 U/L / GGT: x           PT/INR - ( 12 Mar 2020 08:39 )   PT: 24.9 sec;   INR: 2.13 ratio         PTT - ( 12 Mar 2020 08:39 )  PTT:43.7 sec  Urinalysis Basic - ( 10 Mar 2020 13:43 )    Color: Yellow / Appearance: Slightly Turbid / S.015 / pH: x  Gluc: x / Ketone: Negative  / Bili: Negative / Urobili: 3 mg/dL   Blood: x / Protein: Trace / Nitrite: Negative   Leuk Esterase: Large / RBC: 5 /hpf / WBC 35 /HPF   Sq Epi: x / Non Sq Epi: x / Bacteria: Many          Imaging:

## 2020-03-13 DIAGNOSIS — E27.49 OTHER ADRENOCORTICAL INSUFFICIENCY: ICD-10-CM

## 2020-03-13 DIAGNOSIS — I10 ESSENTIAL (PRIMARY) HYPERTENSION: ICD-10-CM

## 2020-03-13 DIAGNOSIS — E27.40 UNSPECIFIED ADRENOCORTICAL INSUFFICIENCY: ICD-10-CM

## 2020-03-13 DIAGNOSIS — E11.65 TYPE 2 DIABETES MELLITUS WITH HYPERGLYCEMIA: ICD-10-CM

## 2020-03-13 DIAGNOSIS — N41.9 INFLAMMATORY DISEASE OF PROSTATE, UNSPECIFIED: ICD-10-CM

## 2020-03-13 LAB
ALBUMIN SERPL ELPH-MCNC: 3 G/DL — LOW (ref 3.3–5)
ALP SERPL-CCNC: 146 U/L — HIGH (ref 40–120)
ALT FLD-CCNC: 12 U/L — SIGNIFICANT CHANGE UP (ref 10–45)
ANION GAP SERPL CALC-SCNC: 11 MMOL/L — SIGNIFICANT CHANGE UP (ref 5–17)
APTT BLD: 48.2 SEC — HIGH (ref 27.5–36.3)
AST SERPL-CCNC: 16 U/L — SIGNIFICANT CHANGE UP (ref 10–40)
BILIRUB SERPL-MCNC: 8.2 MG/DL — HIGH (ref 0.2–1.2)
BUN SERPL-MCNC: 19 MG/DL — SIGNIFICANT CHANGE UP (ref 7–23)
CALCIUM SERPL-MCNC: 9.6 MG/DL — SIGNIFICANT CHANGE UP (ref 8.4–10.5)
CHLORIDE SERPL-SCNC: 96 MMOL/L — SIGNIFICANT CHANGE UP (ref 96–108)
CO2 SERPL-SCNC: 26 MMOL/L — SIGNIFICANT CHANGE UP (ref 22–31)
CORTIS AM PEAK SERPL-MCNC: 2.9 UG/DL — LOW (ref 6–18.4)
CREAT SERPL-MCNC: 1.01 MG/DL — SIGNIFICANT CHANGE UP (ref 0.5–1.3)
GLUCOSE BLDC GLUCOMTR-MCNC: 233 MG/DL — HIGH (ref 70–99)
GLUCOSE BLDC GLUCOMTR-MCNC: 253 MG/DL — HIGH (ref 70–99)
GLUCOSE BLDC GLUCOMTR-MCNC: 263 MG/DL — HIGH (ref 70–99)
GLUCOSE BLDC GLUCOMTR-MCNC: 337 MG/DL — HIGH (ref 70–99)
GLUCOSE SERPL-MCNC: 205 MG/DL — HIGH (ref 70–99)
HCT VFR BLD CALC: 20.9 % — CRITICAL LOW (ref 39–50)
HGB BLD-MCNC: 7.6 G/DL — LOW (ref 13–17)
INR BLD: 2.21 RATIO — HIGH (ref 0.88–1.16)
MCHC RBC-ENTMCNC: 36.4 GM/DL — HIGH (ref 32–36)
MCHC RBC-ENTMCNC: 41.3 PG — HIGH (ref 27–34)
MCV RBC AUTO: 113.6 FL — HIGH (ref 80–100)
NRBC # BLD: 0 /100 WBCS — SIGNIFICANT CHANGE UP (ref 0–0)
PLATELET # BLD AUTO: 58 K/UL — LOW (ref 150–400)
POTASSIUM SERPL-MCNC: 4.2 MMOL/L — SIGNIFICANT CHANGE UP (ref 3.5–5.3)
POTASSIUM SERPL-SCNC: 4.2 MMOL/L — SIGNIFICANT CHANGE UP (ref 3.5–5.3)
PROT SERPL-MCNC: 5.7 G/DL — LOW (ref 6–8.3)
PROTHROM AB SERPL-ACNC: 26.1 SEC — HIGH (ref 10–13.1)
RBC # BLD: 1.84 M/UL — LOW (ref 4.2–5.8)
RBC # FLD: 15.9 % — HIGH (ref 10.3–14.5)
SODIUM SERPL-SCNC: 133 MMOL/L — LOW (ref 135–145)
WBC # BLD: 7.97 K/UL — SIGNIFICANT CHANGE UP (ref 3.8–10.5)
WBC # FLD AUTO: 7.97 K/UL — SIGNIFICANT CHANGE UP (ref 3.8–10.5)

## 2020-03-13 PROCEDURE — 72192 CT PELVIS W/O DYE: CPT | Mod: 26

## 2020-03-13 PROCEDURE — 93010 ELECTROCARDIOGRAM REPORT: CPT

## 2020-03-13 PROCEDURE — 99233 SBSQ HOSP IP/OBS HIGH 50: CPT

## 2020-03-13 PROCEDURE — 99233 SBSQ HOSP IP/OBS HIGH 50: CPT | Mod: GC

## 2020-03-13 PROCEDURE — 99231 SBSQ HOSP IP/OBS SF/LOW 25: CPT

## 2020-03-13 PROCEDURE — 99232 SBSQ HOSP IP/OBS MODERATE 35: CPT

## 2020-03-13 RX ORDER — INSULIN LISPRO 100/ML
7 VIAL (ML) SUBCUTANEOUS
Refills: 0 | Status: DISCONTINUED | OUTPATIENT
Start: 2020-03-13 | End: 2020-03-13

## 2020-03-13 RX ORDER — INSULIN LISPRO 100/ML
10 VIAL (ML) SUBCUTANEOUS
Refills: 0 | Status: DISCONTINUED | OUTPATIENT
Start: 2020-03-13 | End: 2020-03-14

## 2020-03-13 RX ORDER — INSULIN GLARGINE 100 [IU]/ML
15 INJECTION, SOLUTION SUBCUTANEOUS AT BEDTIME
Refills: 0 | Status: DISCONTINUED | OUTPATIENT
Start: 2020-03-13 | End: 2020-03-13

## 2020-03-13 RX ORDER — INSULIN GLARGINE 100 [IU]/ML
25 INJECTION, SOLUTION SUBCUTANEOUS AT BEDTIME
Refills: 0 | Status: DISCONTINUED | OUTPATIENT
Start: 2020-03-13 | End: 2020-03-14

## 2020-03-13 RX ORDER — INSULIN LISPRO 100/ML
VIAL (ML) SUBCUTANEOUS AT BEDTIME
Refills: 0 | Status: DISCONTINUED | OUTPATIENT
Start: 2020-03-13 | End: 2020-03-15

## 2020-03-13 RX ADMIN — Medication 50 MILLILITER(S): at 13:02

## 2020-03-13 RX ADMIN — LACTULOSE 20 GRAM(S): 10 SOLUTION ORAL at 13:01

## 2020-03-13 RX ADMIN — LACTULOSE 20 GRAM(S): 10 SOLUTION ORAL at 21:50

## 2020-03-13 RX ADMIN — MIDODRINE HYDROCHLORIDE 30 MILLIGRAM(S): 2.5 TABLET ORAL at 05:44

## 2020-03-13 RX ADMIN — MIDODRINE HYDROCHLORIDE 30 MILLIGRAM(S): 2.5 TABLET ORAL at 17:43

## 2020-03-13 RX ADMIN — MEROPENEM 100 MILLIGRAM(S): 1 INJECTION INTRAVENOUS at 21:59

## 2020-03-13 RX ADMIN — MIDODRINE HYDROCHLORIDE 30 MILLIGRAM(S): 2.5 TABLET ORAL at 11:39

## 2020-03-13 RX ADMIN — Medication 4: at 09:18

## 2020-03-13 RX ADMIN — Medication 10 UNIT(S): at 17:42

## 2020-03-13 RX ADMIN — Medication 1 MILLIGRAM(S): at 11:39

## 2020-03-13 RX ADMIN — TAMSULOSIN HYDROCHLORIDE 0.4 MILLIGRAM(S): 0.4 CAPSULE ORAL at 21:50

## 2020-03-13 RX ADMIN — Medication 6: at 13:01

## 2020-03-13 RX ADMIN — Medication 8: at 17:42

## 2020-03-13 RX ADMIN — Medication 6 UNIT(S): at 09:18

## 2020-03-13 RX ADMIN — LACTULOSE 20 GRAM(S): 10 SOLUTION ORAL at 05:44

## 2020-03-13 RX ADMIN — Medication 50 MILLILITER(S): at 21:59

## 2020-03-13 RX ADMIN — Medication 1: at 22:18

## 2020-03-13 RX ADMIN — MEROPENEM 100 MILLIGRAM(S): 1 INJECTION INTRAVENOUS at 05:45

## 2020-03-13 RX ADMIN — CHLORHEXIDINE GLUCONATE 1 APPLICATION(S): 213 SOLUTION TOPICAL at 11:39

## 2020-03-13 RX ADMIN — INSULIN GLARGINE 25 UNIT(S): 100 INJECTION, SOLUTION SUBCUTANEOUS at 21:50

## 2020-03-13 RX ADMIN — Medication 50 MILLILITER(S): at 05:45

## 2020-03-13 RX ADMIN — PANTOPRAZOLE SODIUM 40 MILLIGRAM(S): 20 TABLET, DELAYED RELEASE ORAL at 05:47

## 2020-03-13 RX ADMIN — MEROPENEM 100 MILLIGRAM(S): 1 INJECTION INTRAVENOUS at 13:01

## 2020-03-13 NOTE — PROGRESS NOTE ADULT - ATTENDING COMMENTS
Ct scan notes large prostatic abscess. Given size best drained via transurethral approach  will get on OR schedule

## 2020-03-13 NOTE — PROGRESS NOTE ADULT - ASSESSMENT
65y/o male with urinary retention, GNR UTI, with CT findings of enlarged prostate, posterior component increased from prior (grossly normal 12/19).  DDx is prostate abscess vs. malignancy vs. hematoma.      - Patient unable to tolerate MRI, please obtain CT with and without IV contrast to better elucidate collection. Follow-up final read, appears cystic structure  - possible need for drainage  - Continue abx per ID recommendations  - Follow up cultures  - Keep bob and monitor urine output  - Continue flomax

## 2020-03-13 NOTE — CONSULT NOTE ADULT - PROBLEM SELECTOR RECOMMENDATION 2
Diabetes Education and Nutrition Eval  Increase Lantus to 25 units qhs  Increase Humalog to 10 units qac  Low correction scale qac + bedtime  Goal glucose 100-180  Outpt. endo follow-up  Outpt. optho, podiatry, micro/cr  Plan to d/c on basal bolus at home doses.

## 2020-03-13 NOTE — PROGRESS NOTE ADULT - PROBLEM SELECTOR PLAN 9
Pt is on Lantus and Humalog at  home   A1C = 6.4 on 2/2020   cont glucose monitoring   Cont Lantus 12 units at night / Humalog 6 units before meals -adjust based on daily usage.   Glucose consistent diet   Insulin sliding scale

## 2020-03-13 NOTE — PROGRESS NOTE ADULT - SUBJECTIVE AND OBJECTIVE BOX
Chief Complaint:  Patient is a 64y old  Male who presents with a chief complaint of Syncopal episode at home at 0430 this am, LOC for approx 3 minutes per wife, pt had episode of shaking during that time. Hx of multiple falls. Pt reporting abdominal pain for X 1day, no nausea or vomiting, endorses loose stool on lactulose at home (13 Mar 2020 12:27)      Interval Events:     Allergies:  codeine (Anaphylaxis)      Home Medications:    Hospital Medications:  albumin human 25% IVPB 100 milliLiter(s) IV Intermittent every 8 hours  chlorhexidine 2% Cloths 1 Application(s) Topical daily  dextrose 40% Gel 15 Gram(s) Oral once PRN  dextrose 5%. 1000 milliLiter(s) IV Continuous <Continuous>  dextrose 50% Injectable 12.5 Gram(s) IV Push once  dextrose 50% Injectable 25 Gram(s) IV Push once  folic acid 1 milliGRAM(s) Oral daily  glucagon  Injectable 1 milliGRAM(s) IntraMuscular once PRN  insulin glargine Injectable (LANTUS) 15 Unit(s) SubCutaneous at bedtime  insulin lispro (HumaLOG) corrective regimen sliding scale   SubCutaneous three times a day before meals  insulin lispro Injectable (HumaLOG) 7 Unit(s) SubCutaneous three times a day before meals  lactulose Syrup 20 Gram(s) Oral three times a day  meropenem  IVPB 1000 milliGRAM(s) IV Intermittent every 8 hours  midodrine. 30 milliGRAM(s) Oral three times a day  ondansetron Injectable 4 milliGRAM(s) IV Push every 6 hours PRN  pantoprazole    Tablet 40 milliGRAM(s) Oral before breakfast  rifAXIMin 550 milliGRAM(s) Oral two times a day  tamsulosin 0.4 milliGRAM(s) Oral at bedtime      PMHX/PSHX:  GIB (gastrointestinal bleeding)  GERD with esophagitis  Hepatic encephalopathy  Obesity  Fatty liver disease, nonalcoholic  Renal stones  Hypertension  Neuropathy  Hypercholesteremia  Diabetes  S/P cholecystectomy  No significant past surgical history      Family history:  Family history of type 2 diabetes mellitus  Family history of hypertension  Family history of stomach cancer  No pertinent family history in first degree relatives      ROS:     General:  No wt loss, fevers, chills, night sweats, fatigue,   Eyes:  Good vision, no reported pain  ENT:  No sore throat, pain, runny nose, dysphagia  CV:  No pain, palpitations, hypo/hypertension  Resp:  No dyspnea, cough, tachypnea, wheezing  GI:  No pain, No nausea, No vomiting, No diarrhea, No constipation, No weight loss, No fever, No pruritis, No rectal bleeding, No tarry stools, No dysphagia,  :  No pain, bleeding, incontinence, nocturia  Muscle:  No pain, weakness  Neuro:  No weakness, tingling, memory problems  Psych:  No fatigue, insomnia, mood problems, depression  Endocrine:  No polyuria, polydipsia, cold/heat intolerance  Heme:  No petechiae, ecchymosis, easy bruisability  Skin:  No rash, tattoos, scars, edema      PHYSICAL EXAM:   Vital Signs:  Vital Signs Last 24 Hrs  T(C): 36.7 (13 Mar 2020 12:10), Max: 36.9 (12 Mar 2020 21:45)  T(F): 98 (13 Mar 2020 12:10), Max: 98.4 (12 Mar 2020 21:45)  HR: 85 (13 Mar 2020 12:10) (80 - 85)  BP: 128/73 (13 Mar 2020 12:10) (99/62 - 128/73)  BP(mean): --  RR: 18 (13 Mar 2020 12:10) (18 - 18)  SpO2: 97% (13 Mar 2020 12:10) (97% - 99%)  Daily Height in cm: 185.42 (12 Mar 2020 15:47)    Daily     GENERAL:  Appears stated age, well-groomed, well-nourished, no distress  HEENT:  NC/AT,  conjunctivae clear and pink, no thyromegaly, nodules, adenopathy, no JVD, sclera -anicteric  CHEST:  Full & symmetric excursion, no increased effort, breath sounds clear  HEART:  Regular rhythm, S1, S2, no murmur/rub/S3/S4, no abdominal bruit, no edema  ABDOMEN:  Soft, non-tender, non-distended, normoactive bowel sounds,  no masses ,no hepato-splenomegaly, no signs of chronic liver disease  EXTEREMITIES:  no cyanosis,clubbing or edema  SKIN:  No rash/erythema/ecchymoses/petechiae/wounds/abscess/warm/dry  NEURO:  Alert, oriented, no asterixis, no tremor, no encephalopathy    LABS:                        7.6    7.97  )-----------( 58       ( 13 Mar 2020 07:07 )             20.9     03-13    133<L>  |  96  |  19  ----------------------------<  205<H>  4.2   |  26  |  1.01    Ca    9.6      13 Mar 2020 07:07  Mg     1.8     03-12    TPro  5.7<L>  /  Alb  3.0<L>  /  TBili  8.2<H>  /  DBili  x   /  AST  16  /  ALT  12  /  AlkPhos  146<H>  03-13    LIVER FUNCTIONS - ( 13 Mar 2020 07:07 )  Alb: 3.0 g/dL / Pro: 5.7 g/dL / ALK PHOS: 146 U/L / ALT: 12 U/L / AST: 16 U/L / GGT: x           PT/INR - ( 13 Mar 2020 08:45 )   PT: 26.1 sec;   INR: 2.21 ratio         PTT - ( 13 Mar 2020 08:45 )  PTT:48.2 sec        Imaging:    < from: CT Pelvis No Cont (03.13.20 @ 00:09) >  A FINDINGS:    LOWER CHEST: Coronary arterial calcification.    LIVER: Cirrhotic configuration of the liver.  BILE DUCTS: Normal caliber.  GALLBLADDER: Status post cholecystectomy.  SPLEEN: Mildly enlarged, measuring 13.3 cm in length.  PANCREAS: Within normal limits.  ADRENALS: Within normal limits.  KIDNEYS/URETERS: No hydronephrosis. Subcentimeter nonobstructing intrarenal calculi bilaterally, the largest of which measures 0.7 cm in the lower pole of the left kidney.    BLADDER: Beasley catheter and small amount of gas within the urinary bladder.. Bladder wall appears diffusely thickened and there is mild infiltration of the perivesicle fat. Following instillation of contrast into the bladder, contrast is seen outside the bladder in the anterior aspect of the enlarged diffusely low attenuation prostate gland.  REPRODUCTIVE ORGANS: Prostate gland is again noted to be diffusely enlarged and decreased in attenuation,measuring 8.3 x 6.9 cm. Contrast is seen outside the lumen of the urinary bladder tracking along the anterior aspect of the enlarged low-attenuation prostate gland, which may be secondary to fistulous communication with either the posterior wall of the urinary bladder or the urethra.    BOWEL: No bowel obstruction. Appendix is within normal limits.  PERITONEUM: Small volume ascites.  VESSELS: Multiple varices. Atherosclerotic calcification.  RETROPERITONEUM/LYMPH NODES: No lymphadenopathy.    ABDOMINAL WALL: Within normal limits.  BONES: Degenerative changes in the spine.    IMPRESSION:     Prostate gland is again noted to be enlarged with diffusely low attenuation appearance, concerning for underlying abscess. Following instillation of contrast into the urinary bladder, there is evidence of extraluminal contrast tracking along the anterior aspect of the prostate gland. This may be secondary to fistulous communication with either the posterior wall of the urinary bladder or the urethra.    Diffusely thickened wall of the urinary bladder with mild infiltration of the perivesicle fat.    Cirrhotic configuration of the liver with evidence of portal hypertension and multiple varices throughout the abdomen.    Nonobstructing 0.7 cm left intrarenal calculus.    < end of copied text > Chief Complaint:  Patient is a 64y old  Male who presents with a chief complaint of Syncopal episode at home at 0430 this am, LOC for approx 3 minutes per wife, pt had episode of shaking during that time. Hx of multiple falls. Pt reporting abdominal pain for X 1day, no nausea or vomiting, endorses loose stool on lactulose at home (13 Mar 2020 12:27)      Interval Events:  CT yesterday with likely prostate abscess and concern for fistulous connection to bladder.    Allergies:  codeine (Anaphylaxis)      Home Medications:    Hospital Medications:  albumin human 25% IVPB 100 milliLiter(s) IV Intermittent every 8 hours  chlorhexidine 2% Cloths 1 Application(s) Topical daily  dextrose 40% Gel 15 Gram(s) Oral once PRN  dextrose 5%. 1000 milliLiter(s) IV Continuous <Continuous>  dextrose 50% Injectable 12.5 Gram(s) IV Push once  dextrose 50% Injectable 25 Gram(s) IV Push once  folic acid 1 milliGRAM(s) Oral daily  glucagon  Injectable 1 milliGRAM(s) IntraMuscular once PRN  insulin glargine Injectable (LANTUS) 15 Unit(s) SubCutaneous at bedtime  insulin lispro (HumaLOG) corrective regimen sliding scale   SubCutaneous three times a day before meals  insulin lispro Injectable (HumaLOG) 7 Unit(s) SubCutaneous three times a day before meals  lactulose Syrup 20 Gram(s) Oral three times a day  meropenem  IVPB 1000 milliGRAM(s) IV Intermittent every 8 hours  midodrine. 30 milliGRAM(s) Oral three times a day  ondansetron Injectable 4 milliGRAM(s) IV Push every 6 hours PRN  pantoprazole    Tablet 40 milliGRAM(s) Oral before breakfast  rifAXIMin 550 milliGRAM(s) Oral two times a day  tamsulosin 0.4 milliGRAM(s) Oral at bedtime      PMHX/PSHX:  GIB (gastrointestinal bleeding)  GERD with esophagitis  Hepatic encephalopathy  Obesity  Fatty liver disease, nonalcoholic  Renal stones  Hypertension  Neuropathy  Hypercholesteremia  Diabetes  S/P cholecystectomy  No significant past surgical history      Family history:  Family history of type 2 diabetes mellitus  Family history of hypertension  Family history of stomach cancer  No pertinent family history in first degree relatives      ROS:     General:  No wt loss, fevers, chills, night sweats, fatigue,   Eyes:  Good vision, no reported pain  ENT:  No sore throat, pain, runny nose, dysphagia  CV:  No pain, palpitations, hypo/hypertension  Resp:  No dyspnea, cough, tachypnea, wheezing  GI:  No pain, No nausea, No vomiting, No diarrhea, No constipation, No weight loss, No fever, No pruritis, No rectal bleeding, No tarry stools, No dysphagia,  :  No pain, bleeding, incontinence, nocturia  Muscle:  No pain, weakness  Neuro:  No weakness, tingling, memory problems  Psych:  No fatigue, insomnia, mood problems, depression  Endocrine:  No polyuria, polydipsia, cold/heat intolerance  Heme:  No petechiae, ecchymosis, easy bruisability  Skin:  No rash, tattoos, scars, edema      PHYSICAL EXAM:   Vital Signs:  Vital Signs Last 24 Hrs  T(C): 36.7 (13 Mar 2020 12:10), Max: 36.9 (12 Mar 2020 21:45)  T(F): 98 (13 Mar 2020 12:10), Max: 98.4 (12 Mar 2020 21:45)  HR: 85 (13 Mar 2020 12:10) (80 - 85)  BP: 128/73 (13 Mar 2020 12:10) (99/62 - 128/73)  BP(mean): --  RR: 18 (13 Mar 2020 12:10) (18 - 18)  SpO2: 97% (13 Mar 2020 12:10) (97% - 99%)  Daily Height in cm: 185.42 (12 Mar 2020 15:47)    Daily     GENERAL:  Appears stated age, well-groomed, well-nourished, no distress  HEENT:  NC/AT,  +sclera icteric  CHEST:  Full & symmetric excursion, no increased effort, breath sounds clear  HEART:  Regular rhythm, S1, S2, no JVD  ABDOMEN:  Soft, non-tender, non-distended, normoactive bowel sounds  EXTEREMITIES:  no cyanosis,clubbing or edema  SKIN:  Jaundiced  NEURO:  Alert, oriented, +faint asterixis    LABS:                        7.6    7.97  )-----------( 58       ( 13 Mar 2020 07:07 )             20.9     03-13    133<L>  |  96  |  19  ----------------------------<  205<H>  4.2   |  26  |  1.01    Ca    9.6      13 Mar 2020 07:07  Mg     1.8     03-12    TPro  5.7<L>  /  Alb  3.0<L>  /  TBili  8.2<H>  /  DBili  x   /  AST  16  /  ALT  12  /  AlkPhos  146<H>  03-13    LIVER FUNCTIONS - ( 13 Mar 2020 07:07 )  Alb: 3.0 g/dL / Pro: 5.7 g/dL / ALK PHOS: 146 U/L / ALT: 12 U/L / AST: 16 U/L / GGT: x           PT/INR - ( 13 Mar 2020 08:45 )   PT: 26.1 sec;   INR: 2.21 ratio         PTT - ( 13 Mar 2020 08:45 )  PTT:48.2 sec        Imaging:    < from: CT Pelvis No Cont (03.13.20 @ 00:09) >  A FINDINGS:    LOWER CHEST: Coronary arterial calcification.    LIVER: Cirrhotic configuration of the liver.  BILE DUCTS: Normal caliber.  GALLBLADDER: Status post cholecystectomy.  SPLEEN: Mildly enlarged, measuring 13.3 cm in length.  PANCREAS: Within normal limits.  ADRENALS: Within normal limits.  KIDNEYS/URETERS: No hydronephrosis. Subcentimeter nonobstructing intrarenal calculi bilaterally, the largest of which measures 0.7 cm in the lower pole of the left kidney.    BLADDER: Beasley catheter and small amount of gas within the urinary bladder.. Bladder wall appears diffusely thickened and there is mild infiltration of the perivesicle fat. Following instillation of contrast into the bladder, contrast is seen outside the bladder in the anterior aspect of the enlarged diffusely low attenuation prostate gland.  REPRODUCTIVE ORGANS: Prostate gland is again noted to be diffusely enlarged and decreased in attenuation,measuring 8.3 x 6.9 cm. Contrast is seen outside the lumen of the urinary bladder tracking along the anterior aspect of the enlarged low-attenuation prostate gland, which may be secondary to fistulous communication with either the posterior wall of the urinary bladder or the urethra.    BOWEL: No bowel obstruction. Appendix is within normal limits.  PERITONEUM: Small volume ascites.  VESSELS: Multiple varices. Atherosclerotic calcification.  RETROPERITONEUM/LYMPH NODES: No lymphadenopathy.    ABDOMINAL WALL: Within normal limits.  BONES: Degenerative changes in the spine.    IMPRESSION:     Prostate gland is again noted to be enlarged with diffusely low attenuation appearance, concerning for underlying abscess. Following instillation of contrast into the urinary bladder, there is evidence of extraluminal contrast tracking along the anterior aspect of the prostate gland. This may be secondary to fistulous communication with either the posterior wall of the urinary bladder or the urethra.    Diffusely thickened wall of the urinary bladder with mild infiltration of the perivesicle fat.    Cirrhotic configuration of the liver with evidence of portal hypertension and multiple varices throughout the abdomen.    Nonobstructing 0.7 cm left intrarenal calculus.    < end of copied text >

## 2020-03-13 NOTE — CONSULT NOTE ADULT - PROBLEM SELECTOR RECOMMENDATION 9
Clinically no evidence of adrenal insufficiency.  Low cortisol level in this case most likely due to low CBG from cirrhosis.  Can check CBG level to confirm.  Would repeat am cortisol with ACTH level.  Hold off on steroids for now. If any decompensation can start hydrocortisone 50mg IV q8 if needed.

## 2020-03-13 NOTE — PROGRESS NOTE ADULT - ASSESSMENT
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 syncope 2/2 sepsis from UTI suspected prostatitis.    #JEAN Cirrhosis: UNOS listed for liver transplant. MELD-Na 25 3/12  Decompensated by esophageal varices, ascites, h/o hepatic encephalopathy and h/o SBP.   -Ascites: Trace on CT abdomen/pelvis from 02/11/20  -Varices: Small non-bleeding EV on EGD from 12/2019.  -HCC: No focal liver lesions on MRI abdomen 12/2019.   -PSE: No asterixis.     #Recurrent urosepsis  Emphesematous cystitis seen on during prior hospitalization with hx of ESBL Ecoli. CT scan during this admission showing enlarged prostate concerning for abscess. There is concern for possible colovesicular fistula.  Bcx from 3/10 negative  Ucx from 3/10 showing >100,000 ESBL Ecoli  CT pelvis with IV contrast showing diffusely enlarged prostate with extravasation of contrast concerning for fistula      #Recurrent falls: Differential includes deconditioning vs. diabetic neuropathy. Cannot rule out infectious work up  Previous work up from previous hospitalization:    -TTE unremarkable this admission, less likely cardiac cause. CT head without acute neurologic findings.   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 who presents with fall at home, general weakness and ? syncope 2/2 sepsis from UTI suspected prostatitis.    #JEAN Cirrhosis: UNOS listed for liver transplant. MELD-Na 25 3/12  Decompensated by esophageal varices, ascites, h/o hepatic encephalopathy and h/o SBP.   -Ascites: Trace on CT abdomen/pelvis from 02/11/20  -Varices: Small non-bleeding EV on EGD from 12/2019.  -HCC: No focal liver lesions on MRI abdomen 12/2019.   -PSE: No asterixis.     #Recurrent urosepsis  Emphesematous cystitis seen on during prior hospitalization with hx of ESBL Ecoli. CT scan during this admission showing enlarged prostate concerning for abscess. There is concern for possible colovesicular fistula.  Bcx from 3/10 negative  Ucx from 3/10 showing >100,000 ESBL Ecoli  CT pelvis with IV contrast showing diffusely enlarged prostate with extravasation of contrast concerning for fistula      #Recurrent falls: Differential includes deconditioning vs. diabetic neuropathy. Cannot rule out infectious work up  Previous work up from previous hospitalization:    -TTE unremarkable this admission, less likely cardiac cause. CT head without acute neurologic findings.   -Mental status at baseline on lactulose    Recommendations:  - urology consulted, f/u recommendations  - may need surgical management of colovesicular fistula  - cw with abx  - Hold diuretics for now, can discontinue IV albumin today  - Continue with midodrine 30mg TID  - Continue Lactulose and Rifaximin

## 2020-03-13 NOTE — PROGRESS NOTE ADULT - ATTENDING COMMENTS
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 He has had multiple recent hospitalizations, including hospitalization from 2/11/20-2/21/20 due to septic shock secondary to emphysematous cystitis and ESBL E. coli bacteremia and from 2/28/20-3/3/20 after a mechanical fall. He is currently re-admitted after another mechanical fall likely preceded by vasovagal syncope, with evidence of urosepsis as well as mild hepatic encephalopathy and mild MISA.    # Urosepsis: Urine culture (3/10) with >100,000 ESBL E. coli. Blood cultures (3/10 x2) with NGTD. S/p ertapenem (3/10-11), now on meropenem (3/11- ) as per Transplant ID. Afebrile and hemodynamically stable. Did not tolerate MRI, but contrast-enhanced CT done and concerning for cystitis (with diffuse bladder wall thickening with mild infiltration of perivesicular fat), likely prostatic abscess (with diffuse enlargement and hypoattenuation), and likely fistulous communication of the prostate with either the posterior wall of the bladder or with the urethra (given extraluminal contrast extravasation). Awaiting further input from Urology re: necessary interventions for source control.    # Hepatic encephalopathy: Mild and improving. Continue rifaximin and lactulose.    # Mild, non-oliguric MISA: Resolved, with Cr back to his baseline of Cr ~1. Can discontinue IV albumin. Hold diuretics for now.    # Orthostatic hypotension: Chronic, related to end-stage liver disease. Continue home midodrine 30 mg po tid.    # Hyponatremia: Mild. Recommend 1.5L fluid restriction daily.    # Chronic anemia secondary to chronic GI blood loss: No recent overt bleeding. Hb/HCT stable compared to his baseline.    # MGUS: Hematology input appreciated, will follow-up SPEP/DELORES.    # Decompensated JEAN cirrhosis: He is currently UNOS wait-listed for liver transplantation at [facility], blood type A, with MELD-Na 27 (3/10/20), currently with calculated MELD-Na of 25 based on labs today. However, will need resolution of active infection (including likely IR vs surgical intervention for source control given concern for abscess and/or fistula) prior to proceeding with transplantation.    Please don't hesitate to call with any questions/concerns.    Letitia Mo M.D., Ph.D.  Transplant Hepatology  Cell: [phone]

## 2020-03-13 NOTE — CONSULT NOTE ADULT - SUBJECTIVE AND OBJECTIVE BOX
HPI:  63 y/o M w/ hx of Type 2 DM x 12 years complicated by neuropathy and retinopathy. At home on Lantus 15 units qhs and Humalog 10-14 units with meals. Adherent to insulin. Injects in abdomen or arms. Glucose values fasting , during the day 100-180. Rare values > 200 no reported hypoglycemia or symptoms of hypoglycemia. Monitors carbs. No soda. + apple juice. +recent polyuria without polydipsia.   No hx of adrenal disease. No recent steroid use. No narcotic use or appetite stimulants. +weight loss associated with ulcer now weight stable with good appetite. No nausea or vomiting. + dizziness and lightheadedness with orthostasis. No hx of autoimmune disease or family hx of autoimmune disease. Of note + hx of JEAN cirrhosis with hypoalbuminemia.   Also hx of dyslipidemia, obesity, BPH, Orthostatic hypotension, GERD, HTN, Neuropathy, Obesity, HFpEF with mild LV diastolic dysfunction, and decompensated JEAN cirrhosis complicated by ascites, history of SBP, hepatic encephalopathy, and chronic anemia with a history of duodenal ulcer as well as GAVE and duodenal AVM s/p APC (last on 10/11/19), who is UNOS listed for liver transplantation at [facility] who presents with fall at home, general weakness and ? syncope.  Patient states that he was doing ok after dc from hospital and about Saturday 3/7, he started feeling generally weak and had diarrhea where he was moving his BM every 15 mns and his wife told him to hold the Lactulose which he did.  He was was having the constant urge to urinate .  However, yesterday , the Diarrhea stopped but he could not urinate.  Pt denies any nausea, vomiting, abd pain , no cough, no fever or chills or chest pain .      PAST MEDICAL & SURGICAL HISTORY:  GIB (gastrointestinal bleeding)  GERD with esophagitis: Gastritis &amp; Non Bleeding Ulcers  Hepatic encephalopathy  Obesity  Fatty liver disease, nonalcoholic  Renal stones: 25 years ago  Hypertension  Neuropathy  Hypercholesteremia  Diabetes  S/P cholecystectomy      FAMILY HISTORY:  Family history of type 2 diabetes mellitus  Family history of hypertension  Family history of stomach cancer      Social History: No current tobacco use or alcohol use    Outpatient Medications:  · 	tamsulosin 0.4 mg oral capsule: 1 cap(s) orally once a day (at bedtime)  · 	insulin glargine: 15 unit(s) subcutaneous once a day (at bedtime)  · 	insulin lispro: 10-14 unit(s) subcutaneous 3 times a day (before meals)  · 	furosemide 20 mg oral tablet: 1 tab(s) orally once a day  · 	spironolactone 25 mg oral tablet: 2 tab(s) orally once a day  · 	lactulose 10 g/15 mL oral syrup: 30 milliliter(s) orally 3 times a day  · 	rifAXIMin 550 mg oral tablet: 1 tab(s) orally 2 times a day  · 	midodrine 10 mg oral tablet: 3 tab(s) orally 3 times a day  · 	pantoprazole 40 mg oral delayed release tablet: 1 tab(s) orally once a day (before a meal)  · 	folic acid 1 mg oral tablet: 1 tab(s) orally once a day    MEDICATIONS  (STANDING):  albumin human 25% IVPB 100 milliLiter(s) IV Intermittent every 8 hours  chlorhexidine 2% Cloths 1 Application(s) Topical daily  dextrose 5%. 1000 milliLiter(s) (50 mL/Hr) IV Continuous <Continuous>  dextrose 50% Injectable 12.5 Gram(s) IV Push once  dextrose 50% Injectable 25 Gram(s) IV Push once  folic acid 1 milliGRAM(s) Oral daily  insulin glargine Injectable (LANTUS) 25 Unit(s) SubCutaneous at bedtime  insulin lispro (HumaLOG) corrective regimen sliding scale   SubCutaneous three times a day before meals  insulin lispro Injectable (HumaLOG) 10 Unit(s) SubCutaneous three times a day before meals  lactulose Syrup 20 Gram(s) Oral three times a day  meropenem  IVPB 1000 milliGRAM(s) IV Intermittent every 8 hours  midodrine. 30 milliGRAM(s) Oral three times a day  pantoprazole    Tablet 40 milliGRAM(s) Oral before breakfast  rifAXIMin 550 milliGRAM(s) Oral two times a day  tamsulosin 0.4 milliGRAM(s) Oral at bedtime    MEDICATIONS  (PRN):  dextrose 40% Gel 15 Gram(s) Oral once PRN Blood Glucose LESS THAN 70 milliGRAM(s)/deciliter  glucagon  Injectable 1 milliGRAM(s) IntraMuscular once PRN Glucose LESS THAN 70 milligrams/deciliter  ondansetron Injectable 4 milliGRAM(s) IV Push every 6 hours PRN Nausea      Allergies    codeine (Anaphylaxis)    Intolerances      Review of Systems:  Constitutional: No fever, weight now stable s/p large amount of weight loss  Eyes: +retinopathy  Neuro: No headache, +neuropathy  HEENT: No throat pain  Cardiovascular: No chest pain  Respiratory: No SOB  GI: No nausea or vomiting  : + polyuria  Skin: no rash  Psych: no depression  Endocrine: No polydipsia, No heat or cold intolerance, rest as noted in HPI  Hem/lymph: no swelling    All other review of systems negative      PHYSICAL EXAM:  VITALS: T(C): 36.7 (03-13-20 @ 12:10)  T(F): 98 (03-13-20 @ 12:10), Max: 98.4 (03-12-20 @ 21:45)  HR: 89 (03-13-20 @ 14:27) (80 - 89)  BP: 110/64 (03-13-20 @ 14:27) (99/62 - 128/73)  RR:  (18 - 18)  SpO2:  (97% - 99%)  Wt(kg): --  GENERAL: NAD at this time  EYES: No proptosis, EOMI +scleral icterus  HEENT:  Atraumatic, Normocephalic,   THYROID: Normal size, no palpable nodules  RESPIRATORY: Clear to auscultation bilaterally, full excursion, non-labored  CARDIOVASCULAR: Regular rhythm; +systolic murmur, +b/l LE peripheral edema  GI: Soft, mildly distended, non distended, normal bowel sounds  SKIN: Dry, intact, No rashes or lesions  MUSCULOSKELETAL: normal strength  NEURO: follows commands,  PSYCH: Alert and oriented x 3, normal affect, normal mood  CUSHING'S SIGNS: no striae      POCT Blood Glucose.: 253 mg/dL (03-13-20 @ 12:30)  POCT Blood Glucose.: 233 mg/dL (03-13-20 @ 09:02)  POCT Blood Glucose.: 181 mg/dL (03-12-20 @ 23:05)  POCT Blood Glucose.: 263 mg/dL (03-12-20 @ 17:27)  POCT Blood Glucose.: 226 mg/dL (03-12-20 @ 13:46)  POCT Blood Glucose.: 200 mg/dL (03-12-20 @ 08:54)  POCT Blood Glucose.: 246 mg/dL (03-11-20 @ 21:56)  POCT Blood Glucose.: 287 mg/dL (03-11-20 @ 17:46)  POCT Blood Glucose.: 254 mg/dL (03-11-20 @ 13:22)  POCT Blood Glucose.: 252 mg/dL (03-11-20 @ 12:14)  POCT Blood Glucose.: 191 mg/dL (03-11-20 @ 08:53)  POCT Blood Glucose.: 227 mg/dL (03-10-20 @ 21:38)  POCT Blood Glucose.: 232 mg/dL (03-10-20 @ 18:07)                              7.6    7.97  )-----------( 58       ( 13 Mar 2020 07:07 )             20.9       03-13    133<L>  |  96  |  19  ----------------------------<  205<H>  4.2   |  26  |  1.01    EGFR if : 91  EGFR if non : 78    Ca    9.6      03-13  Mg     1.8     03-12  Phos  2.5     03-11    TPro  5.7<L>  /  Alb  3.0<L>  /  TBili  8.2<H>  /  DBili  x   /  AST  16  /  ALT  12  /  AlkPhos  146<H>  03-13    Thyroid Function Tests:  03-11 @ 12:37 TSH 0.97 FreeT4 -- T3 -- Anti TPO -- Anti Thyroglobulin Ab -- TSI --      Hemoglobin A1C, Whole Blood: 6.4 % <H> [4.0 - 5.6] (02-12-20 @ 17:08)        Radiology:

## 2020-03-13 NOTE — PROGRESS NOTE ADULT - ATTENDING COMMENTS
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   Recent admission for ESBL E coli Bacteremia and UTI  Patient found to have pyuria on U/A and urine with visible thick sediment  CT A/P (reviewed with radiology) with significant interval increase in prostate size from last month (concerning for abscess)  Could not tolerate MRI  CT A/P (with PO/IV contrast) without concrete abscess but suspicious appearing prostate. Also with fistulous communication with either the posterior wall of the urinary bladder or the urethra.    If no new cultures can likely deescalate to Ertapenem 1g IV Q24H  Would notify urology of these CT A/P findings so they can comment    Given prostatitis/UTI/abscess patient is not cleared from ID perspective at this time for OLT    Overall, UTI, Prostatitis, Leukocytosis, Hypotension, Positive Culture Finding (UCx), Pre-OLT    I will be away over this upcoming weekend. Please contact the Infectious Diseases Office with any further questions or concerns.     Eusebio Pérez M.D.  [facility] Division of Infectious Disease  8AM-5PM: Pager Number [phone]  After Hours (or if no response): Please contact the Infectious Diseases Office at [phone]     The above assessment and plan were discussed with medicine NP

## 2020-03-13 NOTE — PROGRESS NOTE ADULT - ASSESSMENT
64M with DM2,  dyslipidemia, obesity, BPH, Orthostatic hypotension, GERD, HTN, Neuropathy, Obesity, HFpEF with mild LV diastolic dysfunction, and decompensated JEAN cirrhosis complicated by ascites, history of SBP, hepatic encephalopathy, and chronic anemia with a history of duodenal ulcer as well as GAVE and duodenal AVM s/p APC (last on 10/11/19), who is UNOS listed for liver transplantation at [facility] who presents with fall at home, general weakness and ? syncope 2/2 sepsis from UTI suspected prostatitis.

## 2020-03-13 NOTE — PROGRESS NOTE ADULT - ASSESSMENT
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 He was found to have pyuria and leukocytosis. His urine is very thick, purulent appearing which is concerning for a colo-vesicular fistula. CTAP shows a large increase in the size of his prostate, which is concerning for abscess. Repeat CT scan shows possible fistula from the prostate to the bladder. Urine culture with ESBL E coli.   At this time, he is also listed for OLT.     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  - Continue meropenem 1g q 8 hours  - Follow up cultures  - Monitor for fevers  - Trend WBCs    Pre OLT evaluation   CMV: negative  Toxo: negative  Hepatitis A: immune  Hepatitis B: not immune  Hepatitis C: negative  HIV: negative  HSV-1: positive  HSV-2: positive  Measles: immune  Mumps: immune  Rubella: immune  Quantiferon: negative  Varicella: immune    Vaccinations  - Prevnar given 12/2019  - Pneumovax given 2/2020  - Influenza given 11/2019    - Give hepatitis B vaccine (hemodialysis dose) this admission    Estefanía Henderson, PGY-4  Infectious Disease Fellow   Pager: [phone]  After 5pm/weekends: [phone]

## 2020-03-13 NOTE — PROGRESS NOTE ADULT - SUBJECTIVE AND OBJECTIVE BOX
T(F): 98.1, Max: 98.4 (03-12-20 @ 21:45)  HR: 84  BP: 99/62  SpO2: 98%    OUT:    Indwelling Catheter - Urethral: 1300 mL  Total OUT: 1300 mL        Gen NAD  Abd soft, NTND   SHIRA midline fluctuant cystic structure palpable between base of prostate and rectum, apex of prostate palpable      03-12 @ 07:34    WBC 8.44  / Hct 23.1  / SCr 0.95     .Urine Clean Catch (Midstream)  03-10Escherichia coli ESBL      .Blood Blood-Venous  03-10  No growth to date.      .Blood Blood-Peripheral  03-10  No growth to date.

## 2020-03-13 NOTE — PROGRESS NOTE ADULT - ASSESSMENT
decompensated cirrhosis  frequent falls  UTI  anemia    CT scan noted; concern for development of colo-vesicular fistula  ABx as per ID   urology input noted   urine culture positive  blood remains negative  transplant hepatology input much appreciated  h/h trend noted; no overt s/s of GIB decompensated cirrhosis  frequent falls  UTI  anemia    CT scan noted; concern for development of colo-vesicular fistula  ABx as per ID   urology input noted   urine culture positive  blood remains negative  transplant hepatology input much appreciated  h/h trend noted; no overt s/s of GIB   monitor cbc

## 2020-03-13 NOTE — PROGRESS NOTE ADULT - SUBJECTIVE AND OBJECTIVE BOX
[facility] - Sushila Dhaliwal, Doug, Morenita, Ayo, Colleen Williamson  Office Number:  [phone]    Patient resting comfortably in bed in NAD.  Laying flat with no respiratory distress.  No complaints of chest pain, dyspnea, palpitations, PND, or orthopnea.    ROS: negative unless otherwise mentioned.    Telemetry:  off    MEDICATIONS  (STANDING):  albumin human 25% IVPB 100 milliLiter(s) IV Intermittent every 8 hours  chlorhexidine 2% Cloths 1 Application(s) Topical daily  dextrose 5%. 1000 milliLiter(s) (50 mL/Hr) IV Continuous <Continuous>  dextrose 50% Injectable 12.5 Gram(s) IV Push once  dextrose 50% Injectable 25 Gram(s) IV Push once  folic acid 1 milliGRAM(s) Oral daily  insulin glargine Injectable (LANTUS) 12 Unit(s) SubCutaneous at bedtime  insulin lispro (HumaLOG) corrective regimen sliding scale   SubCutaneous three times a day before meals  insulin lispro Injectable (HumaLOG) 6 Unit(s) SubCutaneous three times a day before meals  lactulose Syrup 20 Gram(s) Oral three times a day  meropenem  IVPB 1000 milliGRAM(s) IV Intermittent every 8 hours  midodrine. 30 milliGRAM(s) Oral three times a day  pantoprazole    Tablet 40 milliGRAM(s) Oral before breakfast  rifAXIMin 550 milliGRAM(s) Oral two times a day  tamsulosin 0.4 milliGRAM(s) Oral at bedtime    MEDICATIONS  (PRN):  dextrose 40% Gel 15 Gram(s) Oral once PRN Blood Glucose LESS THAN 70 milliGRAM(s)/deciliter  glucagon  Injectable 1 milliGRAM(s) IntraMuscular once PRN Glucose LESS THAN 70 milligrams/deciliter  ondansetron Injectable 4 milliGRAM(s) IV Push every 6 hours PRN Nausea      Allergies    codeine (Anaphylaxis)    Intolerances        Vital Signs Last 24 Hrs  T(C): 36.7 (13 Mar 2020 04:26), Max: 36.9 (12 Mar 2020 21:45)  T(F): 98.1 (13 Mar 2020 04:26), Max: 98.4 (12 Mar 2020 21:45)  HR: 84 (13 Mar 2020 04:26) (80 - 84)  BP: 99/62 (13 Mar 2020 04:26) (99/62 - 122/76)  BP(mean): --  RR: 18 (13 Mar 2020 04:26) (18 - 18)  SpO2: 98% (13 Mar 2020 04:26) (98% - 99%)    I&O's Summary    12 Mar 2020 07:01  -  13 Mar 2020 07:00  --------------------------------------------------------  IN: 1180 mL / OUT: 2100 mL / NET: -920 mL        ON EXAM:    Constitutional: well-nourished, well-developed, NAD   HEENT:  MMM, sclerae anicteric, conjunctivae clear, no oral cyanosis.  Pulmonary: Non-labored, breath sounds are clear bilaterally, No wheezing, rales or rhonchi  Cardiovascular: Regular, S1 and S2.  1/6 sys murmur.  No rubs, gallops or clicks  Gastrointestinal: Bowel Sounds present, soft, nontender.   Lymph: No peripheral edema.   Neurological: Alert, no focal deficits  Skin: No rashes.  Psych:  Mood & affect appropriate    LABS: All Labs Reviewed:                        7.6    8.44  )-----------( 70       ( 12 Mar 2020 07:34 )             23.1                         8.0    11.76 )-----------( 84       ( 11 Mar 2020 06:36 )             23.6                         9.7    15.75 )-----------( 111      ( 10 Mar 2020 12:06 )             28.0     13 Mar 2020 07:07    133    |  96     |  19     ----------------------------<  205    4.2     |  26     |  1.01   12 Mar 2020 07:34    133    |  96     |  21     ----------------------------<  225    3.7     |  26     |  0.95   11 Mar 2020 06:33    132    |  94     |  27     ----------------------------<  212    3.7     |  27     |  0.99     Ca    9.6        13 Mar 2020 07:07  Ca    9.6        12 Mar 2020 07:34  Ca    10.0       11 Mar 2020 06:33  Phos  2.5       11 Mar 2020 06:33  Mg     1.8       12 Mar 2020 07:34  Mg     1.7       11 Mar 2020 06:33    TPro  5.7    /  Alb  3.0    /  TBili  8.2    /  DBili  x      /  AST  16     /  ALT  12     /  AlkPhos  146    13 Mar 2020 07:07  TPro  5.8    /  Alb  2.9    /  TBili  8.9    /  DBili  x      /  AST  19     /  ALT  15     /  AlkPhos  157    12 Mar 2020 07:34  TPro  6.1    /  Alb  2.7    /  TBili  12.3   /  DBili  x      /  AST  23     /  ALT  17     /  AlkPhos  117    11 Mar 2020 06:33    PT/INR - ( 13 Mar 2020 08:45 )   PT: 26.1 sec;   INR: 2.21 ratio         PTT - ( 13 Mar 2020 08:45 )  PTT:48.2 sec      Blood Culture: Organism Escherichia coli ESBL  Gram Stain Blood -- Gram Stain --  Specimen Source .Urine Clean Catch (Midstream)  Culture-Blood --    Organism --  Gram Stain Blood -- Gram Stain --  Specimen Source .Blood Blood-Venous  Culture-Blood --    Organism --  Gram Stain Blood -- Gram Stain --  Specimen Source .Blood Blood-Peripheral  Culture-Blood --        03-11 @ 12:37  TSH: 0.97

## 2020-03-13 NOTE — PROGRESS NOTE ADULT - SUBJECTIVE AND OBJECTIVE BOX
INFECTIOUS DISEASE PROGRESS NOTE  Subjective: Patient complains of feeling as though his right ear is clogged for the past 20 or so years. He thinks it is from noise damage from when he was younger. No fevers, chills, chest pain, nausea, vomiting, abdominal pain.     VITALS  Vital Signs Last 24 Hrs  T(C): 36.7 (13 Mar 2020 04:26), Max: 36.9 (12 Mar 2020 21:45)  T(F): 98.1 (13 Mar 2020 04:26), Max: 98.4 (12 Mar 2020 21:45)  HR: 84 (13 Mar 2020 04:26) (80 - 84)  BP: 99/62 (13 Mar 2020 04:26) (99/62 - 122/76)  BP(mean): --  RR: 18 (13 Mar 2020 04:26) (18 - 18)  SpO2: 98% (13 Mar 2020 04:26) (98% - 99%)    I&O's Summary    12 Mar 2020 07:01  -  13 Mar 2020 07:00  --------------------------------------------------------  IN: 1180 mL / OUT: 2100 mL / NET: -920 mL    13 Mar 2020 07:01  -  13 Mar 2020 11:58  --------------------------------------------------------  IN: 240 mL / OUT: 0 mL / NET: 240 mL        CAPILLARY BLOOD GLUCOSE  POCT Blood Glucose.: 233 mg/dL (13 Mar 2020 09:02)  POCT Blood Glucose.: 181 mg/dL (12 Mar 2020 23:05)  POCT Blood Glucose.: 263 mg/dL (12 Mar 2020 17:27)  POCT Blood Glucose.: 226 mg/dL (12 Mar 2020 13:46)      PHYSICAL EXAM  Constitutional: Well appearing man lying comfortably in bed, jaundiced  Eyes: scleral icterus  Neck: supple; no JVD or LAD  Respiratory: CTA B/L  Cardiovascular: +S1/S2, RRR; no appreciable murmurs  Gastrointestinal: soft, NT/ND; +BSx4, no HSM  Extremities: WWP; no clubbing, cyanosis, or edema  Dermatologic: small scrape on lateral right knee without drainage or surrounding erythema  Neurologic: AAOx3; no focal deficits  : bob with thick pyuria    MEDICATIONS  (STANDING):  albumin human 25% IVPB 100 milliLiter(s) IV Intermittent every 8 hours  chlorhexidine 2% Cloths 1 Application(s) Topical daily  dextrose 5%. 1000 milliLiter(s) (50 mL/Hr) IV Continuous <Continuous>  dextrose 50% Injectable 12.5 Gram(s) IV Push once  dextrose 50% Injectable 25 Gram(s) IV Push once  folic acid 1 milliGRAM(s) Oral daily  insulin glargine Injectable (LANTUS) 12 Unit(s) SubCutaneous at bedtime  insulin lispro (HumaLOG) corrective regimen sliding scale   SubCutaneous three times a day before meals  insulin lispro Injectable (HumaLOG) 6 Unit(s) SubCutaneous three times a day before meals  lactulose Syrup 20 Gram(s) Oral three times a day  meropenem  IVPB 1000 milliGRAM(s) IV Intermittent every 8 hours  midodrine. 30 milliGRAM(s) Oral three times a day  pantoprazole    Tablet 40 milliGRAM(s) Oral before breakfast  rifAXIMin 550 milliGRAM(s) Oral two times a day  tamsulosin 0.4 milliGRAM(s) Oral at bedtime    MEDICATIONS  (PRN):  dextrose 40% Gel 15 Gram(s) Oral once PRN Blood Glucose LESS THAN 70 milliGRAM(s)/deciliter  glucagon  Injectable 1 milliGRAM(s) IntraMuscular once PRN Glucose LESS THAN 70 milligrams/deciliter  ondansetron Injectable 4 milliGRAM(s) IV Push every 6 hours PRN Nausea      LABS                        7.6    7.97  )-----------( 58       ( 13 Mar 2020 07:07 )             20.9     03-13    133<L>  |  96  |  19  ----------------------------<  205<H>  4.2   |  26  |  1.01    Ca    9.6      13 Mar 2020 07:07  Mg     1.8     03-12    TPro  5.7<L>  /  Alb  3.0<L>  /  TBili  8.2<H>  /  DBili  x   /  AST  16  /  ALT  12  /  AlkPhos  146<H>  03-13    LIVER FUNCTIONS - ( 13 Mar 2020 07:07 )  Alb: 3.0 g/dL / Pro: 5.7 g/dL / ALK PHOS: 146 U/L / ALT: 12 U/L / AST: 16 U/L / GGT: x           PT/INR - ( 13 Mar 2020 08:45 )   PT: 26.1 sec;   INR: 2.21 ratio         PTT - ( 13 Mar 2020 08:45 )  PTT:48.2 sec    Culture - Urine (collected 03-10-20 @ 17:23)  Source: .Urine Clean Catch (Midstream)  Final Report (03-12-20 @ 17:59):    >100,000 CFU/ml Escherichia coli ESBL  Organism: Escherichia coli ESBL (03-12-20 @ 17:59)  Organism: Escherichia coli ESBL (03-12-20 @ 17:59)      -  Amikacin: S <=16      -  Ampicillin: R >16 These ampicillin results predict results for amoxicillin      -  Ampicillin/Sulbactam: R 16/8 Enterobacter, Citrobacter, and Serratia may develop resistance during prolonged therapy (3-4 days)      -  Aztreonam: R >16      -  Cefazolin: R >16 (MIC_CL_[email or web address]) For uncomplicated UTI with K. pneumoniae, E. coli, or P. mirablis: LEONARDO <=16 is sensitive and LEONARDO >=32 is resistant. This also predicts results for oral agents cefaclor, cefdinir, cefpodoxime, cefprozil, cefuroxime axetil, cephalexin and locarbef for uncomplicated UTI. Note that some isolates may be susceptible to these agents while testing resistant to cefazolin.      -  Cefepime: R >16      -  Cefoxitin: S <=8      -  Ceftriaxone: R >32 Enterobacter, Citrobacter, and Serratia may develop resistance during prolonged therapy      -  Ciprofloxacin: R >2      -  Ertapenem: S <=1      -  Gentamicin: S <=4      -  Imipenem: S <=1      -  Levofloxacin: R >4      -  Meropenem: S <=1      -  Nitrofurantoin: S <=32 Should not be used to treat pyelonephritis      -  Piperacillin/Tazobactam: R <=16      -  Tigecycline: S <=2      -  Tobramycin: S <=4      -  Trimethoprim/Sulfamethoxazole: S <=2/38      Method Type: LEONARDO    Culture - Blood (collected 03-10-20 @ 16:54)  Source: .Blood Blood-Venous  Preliminary Report (03-11-20 @ 17:01):    No growth to date.    Culture - Blood (collected 03-10-20 @ 16:53)  Source: .Blood Blood-Peripheral  Preliminary Report (03-11-20 @ 17:01):    No growth to date. INFECTIOUS DISEASE PROGRESS NOTE  Subjective: Patient complains of feeling as though his right ear is clogged for the past 20 or so years. He thinks it is from noise damage from when he was younger. No fevers, chills, chest pain, nausea, vomiting, abdominal pain.     VITALS  Vital Signs Last 24 Hrs  T(C): 36.7 (13 Mar 2020 04:26), Max: 36.9 (12 Mar 2020 21:45)  T(F): 98.1 (13 Mar 2020 04:26), Max: 98.4 (12 Mar 2020 21:45)  HR: 84 (13 Mar 2020 04:26) (80 - 84)  BP: 99/62 (13 Mar 2020 04:26) (99/62 - 122/76)  BP(mean): --  RR: 18 (13 Mar 2020 04:26) (18 - 18)  SpO2: 98% (13 Mar 2020 04:26) (98% - 99%)    I&O's Summary    12 Mar 2020 07:01  -  13 Mar 2020 07:00  --------------------------------------------------------  IN: 1180 mL / OUT: 2100 mL / NET: -920 mL    13 Mar 2020 07:01  -  13 Mar 2020 11:58  --------------------------------------------------------  IN: 240 mL / OUT: 0 mL / NET: 240 mL        CAPILLARY BLOOD GLUCOSE  POCT Blood Glucose.: 233 mg/dL (13 Mar 2020 09:02)  POCT Blood Glucose.: 181 mg/dL (12 Mar 2020 23:05)  POCT Blood Glucose.: 263 mg/dL (12 Mar 2020 17:27)  POCT Blood Glucose.: 226 mg/dL (12 Mar 2020 13:46)      PHYSICAL EXAM  Constitutional: Well appearing man lying comfortably in bed, jaundiced  Eyes: scleral icterus  Neck: supple; no JVD or LAD  Respiratory: CTA B/L  Cardiovascular: +S1/S2, RRR; no appreciable murmurs  Gastrointestinal: soft, NT/ND; +BSx4, no HSM  Extremities: WWP; no clubbing, cyanosis, or edema  Dermatologic: small scrape on lateral right knee without drainage or surrounding erythema  Neurologic: AAOx3; no focal deficits  : bob with thick pyuria    MEDICATIONS  (STANDING):  albumin human 25% IVPB 100 milliLiter(s) IV Intermittent every 8 hours  chlorhexidine 2% Cloths 1 Application(s) Topical daily  dextrose 5%. 1000 milliLiter(s) (50 mL/Hr) IV Continuous <Continuous>  dextrose 50% Injectable 12.5 Gram(s) IV Push once  dextrose 50% Injectable 25 Gram(s) IV Push once  folic acid 1 milliGRAM(s) Oral daily  insulin glargine Injectable (LANTUS) 12 Unit(s) SubCutaneous at bedtime  insulin lispro (HumaLOG) corrective regimen sliding scale   SubCutaneous three times a day before meals  insulin lispro Injectable (HumaLOG) 6 Unit(s) SubCutaneous three times a day before meals  lactulose Syrup 20 Gram(s) Oral three times a day  meropenem  IVPB 1000 milliGRAM(s) IV Intermittent every 8 hours  midodrine. 30 milliGRAM(s) Oral three times a day  pantoprazole    Tablet 40 milliGRAM(s) Oral before breakfast  rifAXIMin 550 milliGRAM(s) Oral two times a day  tamsulosin 0.4 milliGRAM(s) Oral at bedtime    MEDICATIONS  (PRN):  dextrose 40% Gel 15 Gram(s) Oral once PRN Blood Glucose LESS THAN 70 milliGRAM(s)/deciliter  glucagon  Injectable 1 milliGRAM(s) IntraMuscular once PRN Glucose LESS THAN 70 milligrams/deciliter  ondansetron Injectable 4 milliGRAM(s) IV Push every 6 hours PRN Nausea      LABS                        7.6    7.97  )-----------( 58       ( 13 Mar 2020 07:07 )             20.9     03-13    133<L>  |  96  |  19  ----------------------------<  205<H>  4.2   |  26  |  1.01    Ca    9.6      13 Mar 2020 07:07  Mg     1.8     03-12    TPro  5.7<L>  /  Alb  3.0<L>  /  TBili  8.2<H>  /  DBili  x   /  AST  16  /  ALT  12  /  AlkPhos  146<H>  03-13    LIVER FUNCTIONS - ( 13 Mar 2020 07:07 )  Alb: 3.0 g/dL / Pro: 5.7 g/dL / ALK PHOS: 146 U/L / ALT: 12 U/L / AST: 16 U/L / GGT: x           PT/INR - ( 13 Mar 2020 08:45 )   PT: 26.1 sec;   INR: 2.21 ratio         PTT - ( 13 Mar 2020 08:45 )  PTT:48.2 sec    MICROBIOLOGY:    Culture - Urine (collected 03-10-20 @ 17:23)  Source: .Urine Clean Catch (Midstream)  Final Report (03-12-20 @ 17:59):    >100,000 CFU/ml Escherichia coli ESBL  Organism: Escherichia coli ESBL (03-12-20 @ 17:59)  Organism: Escherichia coli ESBL (03-12-20 @ 17:59)      -  Amikacin: S <=16      -  Ampicillin: R >16 These ampicillin results predict results for amoxicillin      -  Ampicillin/Sulbactam: R 16/8 Enterobacter, Citrobacter, and Serratia may develop resistance during prolonged therapy (3-4 days)      -  Aztreonam: R >16      -  Cefazolin: R >16 (MIC_CL_COM_ENTERIC_CEFAZU) For uncomplicated UTI with K. pneumoniae, E. coli, or P. mirablis: LEONARDO <=16 is sensitive and LEONARDO >=32 is resistant. This also predicts results for oral agents cefaclor, cefdinir, cefpodoxime, cefprozil, cefuroxime axetil, cephalexin and locarbef for uncomplicated UTI. Note that some isolates may be susceptible to these agents while testing resistant to cefazolin.      -  Cefepime: R >16      -  Cefoxitin: S <=8      -  Ceftriaxone: R >32 Enterobacter, Citrobacter, and Serratia may develop resistance during prolonged therapy      -  Ciprofloxacin: R >2      -  Ertapenem: S <=1      -  Gentamicin: S <=4      -  Imipenem: S <=1      -  Levofloxacin: R >4      -  Meropenem: S <=1      -  Nitrofurantoin: S <=32 Should not be used to treat pyelonephritis      -  Piperacillin/Tazobactam: R <=16      -  Tigecycline: S <=2      -  Tobramycin: S <=4      -  Trimethoprim/Sulfamethoxazole: S <=2/38      Method Type: LEONARDO    Culture - Blood (collected 03-10-20 @ 16:54)  Source: .Blood Blood-Venous  Preliminary Report (03-11-20 @ 17:01):    No growth to date.    Culture - Blood (collected 03-10-20 @ 16:53)  Source: .Blood Blood-Peripheral  Preliminary Report (03-11-20 @ 17:01):    No growth to date.    RADIOLOGY:    < from: CT Abdomen and Pelvis No Cont (03.11.20 @ 01:06) >  IMPRESSION: Diffuse low density appearance of the prostate which is increased in size; question underlying abscess. A pelvic MRI and urology consultation are recommended.

## 2020-03-13 NOTE — CONSULT NOTE ADULT - ASSESSMENT
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

## 2020-03-13 NOTE — PROGRESS NOTE ADULT - PROBLEM SELECTOR PLAN 4
General with urinary symptoms with leucocytosis and abnormal urinalysis   Prolonged QTC, cardio following for this will avoid prolonging medications  Daily EKG for prolonged QTC  Repeat orthostatics daily

## 2020-03-13 NOTE — PROGRESS NOTE ADULT - PROBLEM SELECTOR PLAN 2
Low serum am cortisol < 3  CHeck ACTH/Renin/Aldostone now low likihood for adrenal crisis however pt was hypotensive/orthostatic on high doses of midodrine. No recent steroid use.  F/U endocrine for ACTH stimulation test or other recs

## 2020-03-13 NOTE — PROGRESS NOTE ADULT - SUBJECTIVE AND OBJECTIVE BOX
INTERVAL HPI/OVERNIGHT EVENTS:    patient seen and examined at bedside earlier this morning  tolerating PO  denies fever, chills, n/v abd pain  bob with thick pyuria and some hematuria   CT noted     MEDICATIONS  (STANDING):  albumin human 25% IVPB 100 milliLiter(s) IV Intermittent every 8 hours  chlorhexidine 2% Cloths 1 Application(s) Topical daily  dextrose 5%. 1000 milliLiter(s) (50 mL/Hr) IV Continuous <Continuous>  dextrose 50% Injectable 12.5 Gram(s) IV Push once  dextrose 50% Injectable 25 Gram(s) IV Push once  folic acid 1 milliGRAM(s) Oral daily  insulin glargine Injectable (LANTUS) 15 Unit(s) SubCutaneous at bedtime  insulin lispro (HumaLOG) corrective regimen sliding scale   SubCutaneous three times a day before meals  insulin lispro Injectable (HumaLOG) 7 Unit(s) SubCutaneous three times a day before meals  lactulose Syrup 20 Gram(s) Oral three times a day  meropenem  IVPB 1000 milliGRAM(s) IV Intermittent every 8 hours  midodrine. 30 milliGRAM(s) Oral three times a day  pantoprazole    Tablet 40 milliGRAM(s) Oral before breakfast  rifAXIMin 550 milliGRAM(s) Oral two times a day  tamsulosin 0.4 milliGRAM(s) Oral at bedtime    MEDICATIONS  (PRN):  dextrose 40% Gel 15 Gram(s) Oral once PRN Blood Glucose LESS THAN 70 milliGRAM(s)/deciliter  glucagon  Injectable 1 milliGRAM(s) IntraMuscular once PRN Glucose LESS THAN 70 milligrams/deciliter  ondansetron Injectable 4 milliGRAM(s) IV Push every 6 hours PRN Nausea      Allergies    codeine (Anaphylaxis)    Intolerances        Review of Systems:    General:  No wt loss, fevers, chills, night sweats,fatigue,   Eyes:  Good vision, no reported pain  ENT:  No sore throat, pain, runny nose, dysphagia  CV:  No pain, palpitatioins, hypo/hypertension  Resp:  No dyspnea, cough, tachypnea, wheezing  GI:  No pain, No nausea, No vomiting, No diarrhea, No constipatiion, No weight loss, No fever, No pruritis, No rectal bleeding, No tarry stools, No dysphagia,  :  No pain, bleeding, incontinence, nocturia  Muscle:  No pain, weakness  Neuro:  No weakness, tingling, memory problems  Psych:  No fatigue, insomnia, mood problems, depression  Endocrine:  No polyuria, polydypsia, cold/heat intolerance  Heme:  No petechiae, ecchymosis, easy bruisability  Skin:  No rash, tattoos, scars, edema      Vital Signs Last 24 Hrs  T(C): 36.7 (13 Mar 2020 12:10), Max: 36.9 (12 Mar 2020 21:45)  T(F): 98 (13 Mar 2020 12:10), Max: 98.4 (12 Mar 2020 21:45)  HR: 85 (13 Mar 2020 12:10) (80 - 85)  BP: 128/73 (13 Mar 2020 12:10) (99/62 - 128/73)  BP(mean): --  RR: 18 (13 Mar 2020 12:10) (18 - 18)  SpO2: 97% (13 Mar 2020 12:10) (97% - 99%)    PHYSICAL EXAM:    Constitutional: NAD  HEENT: +scleral icterus   Neck: No LAD, supple  Respiratory: non-labored breathing   Cardiovascular: S1 and S2, RRR, no M  Gastrointestinal: BS+, obese, soft, NT/ND  Extremities: No peripheral edema  Vascular: 2+ peripheral pulses  Neurological: A/O x 3, no focal deficits        LABS:                        7.6    7.97  )-----------( 58       ( 13 Mar 2020 07:07 )             20.9     03-13    133<L>  |  96  |  19  ----------------------------<  205<H>  4.2   |  26  |  1.01    Ca    9.6      13 Mar 2020 07:07  Mg     1.8     03-12    TPro  5.7<L>  /  Alb  3.0<L>  /  TBili  8.2<H>  /  DBili  x   /  AST  16  /  ALT  12  /  AlkPhos  146<H>  03-13    PT/INR - ( 13 Mar 2020 08:45 )   PT: 26.1 sec;   INR: 2.21 ratio         PTT - ( 13 Mar 2020 08:45 )  PTT:48.2 sec      RADIOLOGY & ADDITIONAL TESTS:  < from: CT Pelvis No Cont (03.13.20 @ 00:09) >    EXAM:  CT PELVIS ONLY                          EXAM:  CT ABDOMEN AND PELVIS OC IC                            PROCEDURE DATE:  03/12/2020            INTERPRETATION:  CLINICAL INFORMATION: Urinary tract infection with enlarged prostate gland. Evaluate for fistula.    COMPARISON: Prior CT dated 3/10/2020.    PROCEDURE:    CYSTOGRAM:  Initial CT of the Pelvis was performed.  Precontrast images were obtained through the pelvis followed by imaging after the installation of a dilute mixture of [id number] via a Bob catheter.  Sagittal and coronal reformats were performed.    CT of the Abdomen and Pelvis was subsequently performed with intravenous contrast.   Intravenous contrast: 90 ml Omnipaque 350. 10 ml discarded.  Oral contrast: positive contrast was administered.  Sagittal and coronal reformats were performed.    A FINDINGS:    LOWER CHEST: Coronary arterial calcification.    LIVER: Cirrhotic configuration of the liver.  BILE DUCTS: Normal caliber.  GALLBLADDER: Status post cholecystectomy.  SPLEEN: Mildly enlarged, measuring 13.3 cm in length.  PANCREAS: Within normal limits.  ADRENALS: Within normal limits.  KIDNEYS/URETERS: No hydronephrosis. Subcentimeter nonobstructing intrarenal calculi bilaterally, the largest of which measures 0.7 cm in the lower pole of the left kidney.    BLADDER: Obb catheter and small amount of gas within the urinary bladder.. Bladder wall appears diffusely thickened and there is mild infiltration of the perivesicle fat. Following instillation of contrast into the bladder, contrast is seen outside the bladder in the anterior aspect of the enlarged diffusely low attenuation prostate gland.  REPRODUCTIVE ORGANS: Prostate gland is again noted to be diffusely enlarged and decreased in attenuation,measuring 8.3 x 6.9 cm. Contrast is seen outside the lumen of the urinary bladder tracking along the anterior aspect of the enlarged low-attenuation prostate gland, which may be secondary to fistulous communication with either the posterior wall of the urinary bladder or the urethra.    BOWEL: No bowel obstruction. Appendix is within normal limits.  PERITONEUM: Small volume ascites.  VESSELS: Multiple varices. Atherosclerotic calcification.  RETROPERITONEUM/LYMPH NODES: No lymphadenopathy.    ABDOMINAL WALL: Within normal limits.  BONES: Degenerative changes in the spine.    IMPRESSION:     Prostate gland is again noted to be enlarged with diffusely low attenuation appearance, concerning for underlying abscess. Following instillation of contrast into the urinary bladder, there is evidence of extraluminal contrast tracking along the anterior aspect of the prostate gland. This may be secondary to fistulous communication with either the posterior wall of the urinary bladder or the urethra.    Diffusely thickened wall of the urinary bladder with mild infiltration of the perivesicle fat.    Cirrhotic configuration of the liver with evidence of portal hypertension and multiple varices throughout the abdomen.    Nonobstructing 0.7 cm left intrarenal calculus.                        BHUMI BROWER M.D., ATTENDING RADIOLOGIST  This document has been electronically signed. Mar 13 2020 10:36AM                < end of copied text >

## 2020-03-13 NOTE — PROGRESS NOTE ADULT - SUBJECTIVE AND OBJECTIVE BOX
PROGRESS NOTE:     Patient is a 64y old  Male who presents with a chief complaint of Syncopal episode at home at 0430 this am, LOC for approx 3 minutes per wife, pt had episode of shaking during that time. Hx of multiple falls. Pt reporting abdominal pain for X 1day, no nausea or vomiting, endorses loose stool on lactulose at home (13 Mar 2020 13:44)      SUBJECTIVE / OVERNIGHT EVENTS: MARS    ADDITIONAL REVIEW OF SYSTEMS:  No fevers or chlls  No n/v/d    MEDICATIONS  (STANDING):  albumin human 25% IVPB 100 milliLiter(s) IV Intermittent every 8 hours  chlorhexidine 2% Cloths 1 Application(s) Topical daily  dextrose 5%. 1000 milliLiter(s) (50 mL/Hr) IV Continuous <Continuous>  dextrose 50% Injectable 12.5 Gram(s) IV Push once  dextrose 50% Injectable 25 Gram(s) IV Push once  folic acid 1 milliGRAM(s) Oral daily  insulin glargine Injectable (LANTUS) 15 Unit(s) SubCutaneous at bedtime  insulin lispro (HumaLOG) corrective regimen sliding scale   SubCutaneous three times a day before meals  insulin lispro Injectable (HumaLOG) 7 Unit(s) SubCutaneous three times a day before meals  lactulose Syrup 20 Gram(s) Oral three times a day  meropenem  IVPB 1000 milliGRAM(s) IV Intermittent every 8 hours  midodrine. 30 milliGRAM(s) Oral three times a day  pantoprazole    Tablet 40 milliGRAM(s) Oral before breakfast  rifAXIMin 550 milliGRAM(s) Oral two times a day  tamsulosin 0.4 milliGRAM(s) Oral at bedtime    MEDICATIONS  (PRN):  dextrose 40% Gel 15 Gram(s) Oral once PRN Blood Glucose LESS THAN 70 milliGRAM(s)/deciliter  glucagon  Injectable 1 milliGRAM(s) IntraMuscular once PRN Glucose LESS THAN 70 milligrams/deciliter  ondansetron Injectable 4 milliGRAM(s) IV Push every 6 hours PRN Nausea      CAPILLARY BLOOD GLUCOSE      POCT Blood Glucose.: 253 mg/dL (13 Mar 2020 12:30)  POCT Blood Glucose.: 233 mg/dL (13 Mar 2020 09:02)  POCT Blood Glucose.: 181 mg/dL (12 Mar 2020 23:05)  POCT Blood Glucose.: 263 mg/dL (12 Mar 2020 17:27)    I&O's Summary    12 Mar 2020 07:01  -  13 Mar 2020 07:00  --------------------------------------------------------  IN: 1180 mL / OUT: 2100 mL / NET: -920 mL    13 Mar 2020 07:01  -  13 Mar 2020 14:13  --------------------------------------------------------  IN: 240 mL / OUT: 0 mL / NET: 240 mL        PHYSICAL EXAM:  Vital Signs Last 24 Hrs  T(C): 36.7 (13 Mar 2020 12:10), Max: 36.9 (12 Mar 2020 21:45)  T(F): 98 (13 Mar 2020 12:10), Max: 98.4 (12 Mar 2020 21:45)  HR: 85 (13 Mar 2020 12:10) (80 - 85)  BP: 128/73 (13 Mar 2020 12:10) (99/62 - 128/73)  BP(mean): --  RR: 18 (13 Mar 2020 12:10) (18 - 18)  SpO2: 97% (13 Mar 2020 12:10) (97% - 99%)    CONSTITUTIONAL: NAD, well-developed, obese, ill appearing, jaundice, scleral icterus  RESPIRATORY: Normal respiratory effort; lungs are clear to auscultation bilaterally  CARDIOVASCULAR: Regular rate and rhythm, normal S1 and S2, no murmur/rub/gallop; No lower extremity edema; Peripheral pulses are 2+ bilaterally  ABDOMEN: Nontender to palpation, normoactive bowel sounds, no rebound/guarding; No hepatosplenomegaly no asterxis  : purulent draining from bob   MUSCLOSKELETAL: no clubbing or cyanosis of digits; no joint swelling or tenderness to palpation  PSYCH: A+O to person, place, and time; affect appropriate    LABS:                        7.6    7.97  )-----------( 58       ( 13 Mar 2020 07:07 )             20.9     03-13    133<L>  |  96  |  19  ----------------------------<  205<H>  4.2   |  26  |  1.01    Ca    9.6      13 Mar 2020 07:07  Mg     1.8     03-12    TPro  5.7<L>  /  Alb  3.0<L>  /  TBili  8.2<H>  /  DBili  x   /  AST  16  /  ALT  12  /  AlkPhos  146<H>  03-13    PT/INR - ( 13 Mar 2020 08:45 )   PT: 26.1 sec;   INR: 2.21 ratio         PTT - ( 13 Mar 2020 08:45 )  PTT:48.2 sec          Culture - Urine (collected 10 Mar 2020 17:23)  Source: .Urine Clean Catch (Midstream)  Final Report (12 Mar 2020 17:59):    >100,000 CFU/ml Escherichia coli ESBL  Organism: Escherichia coli ESBL (12 Mar 2020 17:59)  Organism: Escherichia coli ESBL (12 Mar 2020 17:59)    Culture - Blood (collected 10 Mar 2020 16:54)  Source: .Blood Blood-Venous  Preliminary Report (11 Mar 2020 17:01):    No growth to date.    Culture - Blood (collected 10 Mar 2020 16:53)  Source: .Blood Blood-Peripheral  Preliminary Report (11 Mar 2020 17:01):    No growth to date.        RADIOLOGY & ADDITIONAL TESTS:  Results Reviewed:   Imaging Personally Reviewed:  Electrocardiogram Personally Reviewed:    COORDINATION OF CARE:  Care Discussed with Consultants/Other Providers [Y/N]:  Prior or Outpatient Records Reviewed [Y/N]:

## 2020-03-14 ENCOUNTER — TRANSCRIPTION ENCOUNTER (OUTPATIENT)
Age: 65
End: 2020-03-14

## 2020-03-14 LAB
ACTH SER-ACNC: 13.4 PG/ML — SIGNIFICANT CHANGE UP (ref 7.2–63.3)
ALBUMIN SERPL ELPH-MCNC: 3.3 G/DL — SIGNIFICANT CHANGE UP (ref 3.3–5)
ALDOST SERPL-MCNC: 5.4 NG/DL — SIGNIFICANT CHANGE UP
ALP SERPL-CCNC: 183 U/L — HIGH (ref 40–120)
ALT FLD-CCNC: 13 U/L — SIGNIFICANT CHANGE UP (ref 10–45)
ANION GAP SERPL CALC-SCNC: 8 MMOL/L — SIGNIFICANT CHANGE UP (ref 5–17)
APTT BLD: 51.2 SEC — HIGH (ref 27.5–36.3)
AST SERPL-CCNC: 18 U/L — SIGNIFICANT CHANGE UP (ref 10–40)
BILIRUB SERPL-MCNC: 7.4 MG/DL — HIGH (ref 0.2–1.2)
BLD GP AB SCN SERPL QL: NEGATIVE — SIGNIFICANT CHANGE UP
BUN SERPL-MCNC: 20 MG/DL — SIGNIFICANT CHANGE UP (ref 7–23)
CALCIUM SERPL-MCNC: 9.7 MG/DL — SIGNIFICANT CHANGE UP (ref 8.4–10.5)
CHLORIDE SERPL-SCNC: 96 MMOL/L — SIGNIFICANT CHANGE UP (ref 96–108)
CO2 SERPL-SCNC: 26 MMOL/L — SIGNIFICANT CHANGE UP (ref 22–31)
CREAT SERPL-MCNC: 0.93 MG/DL — SIGNIFICANT CHANGE UP (ref 0.5–1.3)
GLUCOSE BLDC GLUCOMTR-MCNC: 234 MG/DL — HIGH (ref 70–99)
GLUCOSE BLDC GLUCOMTR-MCNC: 247 MG/DL — HIGH (ref 70–99)
GLUCOSE BLDC GLUCOMTR-MCNC: 261 MG/DL — HIGH (ref 70–99)
GLUCOSE BLDC GLUCOMTR-MCNC: 274 MG/DL — HIGH (ref 70–99)
GLUCOSE SERPL-MCNC: 264 MG/DL — HIGH (ref 70–99)
HCT VFR BLD CALC: 22.7 % — LOW (ref 39–50)
HGB BLD-MCNC: 7.4 G/DL — LOW (ref 13–17)
INR BLD: 2.16 RATIO — HIGH (ref 0.88–1.16)
MCHC RBC-ENTMCNC: 32.6 GM/DL — SIGNIFICANT CHANGE UP (ref 32–36)
MCHC RBC-ENTMCNC: 36.1 PG — HIGH (ref 27–34)
MCV RBC AUTO: 110.7 FL — HIGH (ref 80–100)
NRBC # BLD: 0 /100 WBCS — SIGNIFICANT CHANGE UP (ref 0–0)
PLATELET # BLD AUTO: 75 K/UL — LOW (ref 150–400)
POTASSIUM SERPL-MCNC: 4.5 MMOL/L — SIGNIFICANT CHANGE UP (ref 3.5–5.3)
POTASSIUM SERPL-SCNC: 4.5 MMOL/L — SIGNIFICANT CHANGE UP (ref 3.5–5.3)
PROT SERPL-MCNC: 5.9 G/DL — LOW (ref 6–8.3)
PROTHROM AB SERPL-ACNC: 25.2 SEC — HIGH (ref 10–13.1)
RBC # BLD: 2.05 M/UL — LOW (ref 4.2–5.8)
RBC # FLD: 15 % — HIGH (ref 10.3–14.5)
RH IG SCN BLD-IMP: POSITIVE — SIGNIFICANT CHANGE UP
SODIUM SERPL-SCNC: 130 MMOL/L — LOW (ref 135–145)
WBC # BLD: 6.59 K/UL — SIGNIFICANT CHANGE UP (ref 3.8–10.5)
WBC # FLD AUTO: 6.59 K/UL — SIGNIFICANT CHANGE UP (ref 3.8–10.5)

## 2020-03-14 PROCEDURE — 99232 SBSQ HOSP IP/OBS MODERATE 35: CPT | Mod: GC

## 2020-03-14 PROCEDURE — 99233 SBSQ HOSP IP/OBS HIGH 50: CPT

## 2020-03-14 PROCEDURE — 99232 SBSQ HOSP IP/OBS MODERATE 35: CPT

## 2020-03-14 PROCEDURE — 93010 ELECTROCARDIOGRAM REPORT: CPT

## 2020-03-14 PROCEDURE — 99231 SBSQ HOSP IP/OBS SF/LOW 25: CPT | Mod: 24

## 2020-03-14 RX ORDER — SODIUM CHLORIDE 9 MG/ML
1000 INJECTION, SOLUTION INTRAVENOUS
Refills: 0 | Status: DISCONTINUED | OUTPATIENT
Start: 2020-03-15 | End: 2020-03-15

## 2020-03-14 RX ORDER — INSULIN GLARGINE 100 [IU]/ML
30 INJECTION, SOLUTION SUBCUTANEOUS AT BEDTIME
Refills: 0 | Status: DISCONTINUED | OUTPATIENT
Start: 2020-03-14 | End: 2020-03-14

## 2020-03-14 RX ORDER — INSULIN GLARGINE 100 [IU]/ML
15 INJECTION, SOLUTION SUBCUTANEOUS AT BEDTIME
Refills: 0 | Status: DISCONTINUED | OUTPATIENT
Start: 2020-03-14 | End: 2020-03-15

## 2020-03-14 RX ORDER — INSULIN GLARGINE 100 [IU]/ML
7 INJECTION, SOLUTION SUBCUTANEOUS ONCE
Refills: 0 | Status: COMPLETED | OUTPATIENT
Start: 2020-03-14 | End: 2020-03-14

## 2020-03-14 RX ORDER — INSULIN LISPRO 100/ML
12 VIAL (ML) SUBCUTANEOUS
Refills: 0 | Status: DISCONTINUED | OUTPATIENT
Start: 2020-03-14 | End: 2020-03-15

## 2020-03-14 RX ADMIN — LACTULOSE 20 GRAM(S): 10 SOLUTION ORAL at 14:02

## 2020-03-14 RX ADMIN — Medication 50 MILLILITER(S): at 22:23

## 2020-03-14 RX ADMIN — MEROPENEM 100 MILLIGRAM(S): 1 INJECTION INTRAVENOUS at 22:22

## 2020-03-14 RX ADMIN — Medication 1: at 22:10

## 2020-03-14 RX ADMIN — MEROPENEM 100 MILLIGRAM(S): 1 INJECTION INTRAVENOUS at 14:02

## 2020-03-14 RX ADMIN — LACTULOSE 20 GRAM(S): 10 SOLUTION ORAL at 22:11

## 2020-03-14 RX ADMIN — Medication 50 MILLILITER(S): at 06:19

## 2020-03-14 RX ADMIN — CHLORHEXIDINE GLUCONATE 1 APPLICATION(S): 213 SOLUTION TOPICAL at 11:50

## 2020-03-14 RX ADMIN — Medication 12 UNIT(S): at 17:43

## 2020-03-14 RX ADMIN — Medication 4: at 12:49

## 2020-03-14 RX ADMIN — Medication 50 MILLILITER(S): at 14:02

## 2020-03-14 RX ADMIN — TAMSULOSIN HYDROCHLORIDE 0.4 MILLIGRAM(S): 0.4 CAPSULE ORAL at 22:12

## 2020-03-14 RX ADMIN — Medication 6: at 08:54

## 2020-03-14 RX ADMIN — Medication 10 UNIT(S): at 08:54

## 2020-03-14 RX ADMIN — PANTOPRAZOLE SODIUM 40 MILLIGRAM(S): 20 TABLET, DELAYED RELEASE ORAL at 06:20

## 2020-03-14 RX ADMIN — MIDODRINE HYDROCHLORIDE 30 MILLIGRAM(S): 2.5 TABLET ORAL at 06:19

## 2020-03-14 RX ADMIN — MIDODRINE HYDROCHLORIDE 30 MILLIGRAM(S): 2.5 TABLET ORAL at 17:42

## 2020-03-14 RX ADMIN — INSULIN GLARGINE 7 UNIT(S): 100 INJECTION, SOLUTION SUBCUTANEOUS at 22:12

## 2020-03-14 RX ADMIN — MIDODRINE HYDROCHLORIDE 30 MILLIGRAM(S): 2.5 TABLET ORAL at 11:50

## 2020-03-14 RX ADMIN — LACTULOSE 20 GRAM(S): 10 SOLUTION ORAL at 06:20

## 2020-03-14 RX ADMIN — MEROPENEM 100 MILLIGRAM(S): 1 INJECTION INTRAVENOUS at 06:18

## 2020-03-14 RX ADMIN — Medication 1 MILLIGRAM(S): at 11:50

## 2020-03-14 RX ADMIN — Medication 10 UNIT(S): at 12:49

## 2020-03-14 RX ADMIN — Medication 4: at 17:44

## 2020-03-14 NOTE — PROGRESS NOTE ADULT - ASSESSMENT
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

## 2020-03-14 NOTE — PROGRESS NOTE ADULT - SUBJECTIVE AND OBJECTIVE BOX
Pt seen, resting in bed      MEDICATIONS  (STANDING):  albumin human 25% IVPB 100 milliLiter(s) IV Intermittent every 8 hours  chlorhexidine 2% Cloths 1 Application(s) Topical daily  dextrose 5%. 1000 milliLiter(s) (50 mL/Hr) IV Continuous <Continuous>  dextrose 50% Injectable 12.5 Gram(s) IV Push once  dextrose 50% Injectable 25 Gram(s) IV Push once  folic acid 1 milliGRAM(s) Oral daily  insulin glargine Injectable (LANTUS) 25 Unit(s) SubCutaneous at bedtime  insulin lispro (HumaLOG) corrective regimen sliding scale   SubCutaneous at bedtime  insulin lispro (HumaLOG) corrective regimen sliding scale   SubCutaneous three times a day before meals  insulin lispro Injectable (HumaLOG) 10 Unit(s) SubCutaneous three times a day before meals  lactulose Syrup 20 Gram(s) Oral three times a day  meropenem  IVPB 1000 milliGRAM(s) IV Intermittent every 8 hours  midodrine. 30 milliGRAM(s) Oral three times a day  pantoprazole    Tablet 40 milliGRAM(s) Oral before breakfast  rifAXIMin 550 milliGRAM(s) Oral two times a day  tamsulosin 0.4 milliGRAM(s) Oral at bedtime    MEDICATIONS  (PRN):  dextrose 40% Gel 15 Gram(s) Oral once PRN Blood Glucose LESS THAN 70 milliGRAM(s)/deciliter  glucagon  Injectable 1 milliGRAM(s) IntraMuscular once PRN Glucose LESS THAN 70 milligrams/deciliter  ondansetron Injectable 4 milliGRAM(s) IV Push every 6 hours PRN Nausea      ROS  No fever, sweats, chills  No epistaxis, HA, sore throat  No CP, SOB, cough, sputum  No n/v/d, abd pain, melena, hematochezia  No edema  No rash  No anxiety  No back pain, joint pain  No bleeding, bruising  No dysuria, hematuria    Vital Signs Last 24 Hrs  T(C): 36.7 (14 Mar 2020 11:47), Max: 36.7 (13 Mar 2020 21:26)  T(F): 98 (14 Mar 2020 11:47), Max: 98 (13 Mar 2020 21:26)  HR: 83 (14 Mar 2020 11:47) (80 - 89)  BP: 114/64 (14 Mar 2020 11:47) (109/61 - 120/63)  BP(mean): --  RR: 18 (14 Mar 2020 11:47) (18 - 18)  SpO2: 98% (14 Mar 2020 11:47) (97% - 99%)    PE  NAD  Awake, alert  Anicteric, MMM  RRR  CTAB  Abd soft, NT, ND  No c/c/e  No rash grossly  FROM                          7.4    6.59  )-----------( 75       ( 14 Mar 2020 06:49 )             22.7       03-14    130<L>  |  96  |  20  ----------------------------<  264<H>  4.5   |  26  |  0.93    Ca    9.7      14 Mar 2020 06:49    TPro  5.9<L>  /  Alb  3.3  /  TBili  7.4<H>  /  DBili  x   /  AST  18  /  ALT  13  /  AlkPhos  183<H>  03-14

## 2020-03-14 NOTE — PROGRESS NOTE ADULT - ASSESSMENT
63y/o male with urinary retention, GNR UTI, with CT findings of enlarged prostate, posterior component increased from prior (grossly normal 12/19).  DDx is prostate abscess vs. malignancy vs. hematoma.      - Patient unable to tolerate MRI, please obtain CT with and without IV contrast to better elucidate collection. Follow-up final read, appears cystic structure  - possible need for drainage  - Continue abx per ID recommendations  - Follow up cultures  - Keep bob and monitor urine output  - Continue flomax 63y/o male with urinary retention, GNR UTI, with CT findings of enlarged prostate, posterior component increased from prior (grossly normal 12/19). CT consistent with large prostate abscess.     - possible need for drainage - will tentatively plan for TURP/unroofing on Wednesday  - Continue abx per ID recommendations  - Follow up cultures  - Keep bob and monitor urine output  - Continue flomax 65y/o male with urinary retention, GNR UTI, with CT findings of enlarged prostate, posterior component increased from prior (grossly normal 12/19). CT consistent with large prostate abscess.     - possible need for drainage - will tentatively plan for TURP/unroofing [place] 3/15  - Continue abx per ID recommendations  - Follow up cultures  - Keep bob and monitor urine output  - Continue flomax  - ***Please document medical clearance and make NPO after midnight tonight

## 2020-03-14 NOTE — PROGRESS NOTE ADULT - SUBJECTIVE AND OBJECTIVE BOX
· Subjective and Objective:   [facility] - Sushila Dhaliwal, Morenita Camacho, Clay Rollins [place], [place]  Office Number:  [phone]    Patient resting comfortably in bed in NAD.  Laying flat with no respiratory distress.  No complaints of chest pain, dyspnea, palpitations, PND, or orthopnea.    ROS: negative unless otherwise mentioned.    Telemetry:  off      PAST MEDICAL & SURGICAL HISTORY:  GIB (gastrointestinal bleeding)  GERD with esophagitis: Gastritis &amp; Non Bleeding Ulcers  Hepatic encephalopathy  Obesity  Fatty liver disease, nonalcoholic  Renal stones: 25 years ago  Hypertension  Neuropathy  Hypercholesteremia  Diabetes  S/P cholecystectomy      MEDICATIONS  (STANDING):  albumin human 25% IVPB 100 milliLiter(s) IV Intermittent every 8 hours  chlorhexidine 2% Cloths 1 Application(s) Topical daily  dextrose 5%. 1000 milliLiter(s) (50 mL/Hr) IV Continuous <Continuous>  dextrose 50% Injectable 12.5 Gram(s) IV Push once  dextrose 50% Injectable 25 Gram(s) IV Push once  folic acid 1 milliGRAM(s) Oral daily  insulin glargine Injectable (LANTUS) 25 Unit(s) SubCutaneous at bedtime  insulin lispro (HumaLOG) corrective regimen sliding scale   SubCutaneous at bedtime  insulin lispro (HumaLOG) corrective regimen sliding scale   SubCutaneous three times a day before meals  insulin lispro Injectable (HumaLOG) 10 Unit(s) SubCutaneous three times a day before meals  lactulose Syrup 20 Gram(s) Oral three times a day  meropenem  IVPB 1000 milliGRAM(s) IV Intermittent every 8 hours  midodrine. 30 milliGRAM(s) Oral three times a day  pantoprazole    Tablet 40 milliGRAM(s) Oral before breakfast  rifAXIMin 550 milliGRAM(s) Oral two times a day  tamsulosin 0.4 milliGRAM(s) Oral at bedtime      Allergies    codeine (Anaphylaxis)    Intolerances                              7.6    7.97  )-----------( 58       ( 13 Mar 2020 07:07 )             20.9       03-13    133<L>  |  96  |  19  ----------------------------<  205<H>  4.2   |  26  |  1.01    Ca    9.6      13 Mar 2020 07:07  Mg     1.8     03-12    TPro  5.7<L>  /  Alb  3.0<L>  /  TBili  8.2<H>  /  DBili  x   /  AST  16  /  ALT  12  /  AlkPhos  146<H>  03-13      LIVER FUNCTIONS - ( 13 Mar 2020 07:07 )  Alb: 3.0 g/dL / Pro: 5.7 g/dL / ALK PHOS: 146 U/L / ALT: 12 U/L / AST: 16 U/L / GGT: x             PT/INR - ( 13 Mar 2020 08:45 )   PT: 26.1 sec;   INR: 2.21 ratio         PTT - ( 13 Mar 2020 08:45 )  PTT:48.2 sec                      Daily     Daily Weight in k.2 (14 Mar 2020 04:28)    I&O's Summary    13 Mar 2020 07:01  -  14 Mar 2020 07:00  --------------------------------------------------------  IN: 1190 mL / OUT: 1500 mL / NET: -310 mL        Vital Signs Last 24 Hrs  T(C): 36.4 (14 Mar 2020 04:28), Max: 36.7 (13 Mar 2020 12:10)  T(F): 97.5 (14 Mar 2020 04:28), Max: 98 (13 Mar 2020 12:10)  HR: 80 (14 Mar 2020 04:28) (80 - 89)  BP: 109/61 (14 Mar 2020 04:28) (109/61 - 128/73)  BP(mean): --  RR: 18 (14 Mar 2020 04:28) (18 - 18)  SpO2: 99% (14 Mar 2020 04:28) (97% - 99%)    PHYSICAL EXAM:   · Constitutional	Well-developed, well nourished  · Eyes	EOMI; PERRL; no drainage or redness  · ENMT	No oral lesions; no gross abnormalities  · Neck	No bruits; no thyromegaly or nodules  · Respiratory	Normal breath sounds b/l, No RRW  · Cardiovascular	Regular rate & rhythm, normal S1, S2; no murmurs, gallops or rubs; no S3, S4  · Gastrointestinal	Soft, non-tender, no hepatosplenomegaly, normal bowel sounds  · Extremities	No cyanosis, clubbing or edema  · Vascular	Equal and normal pulses (carotid, femoral, dorsalis pedis)  · Neurological	Alert & oriented; no sensory, motor or coordination deficits, normal reflexes

## 2020-03-14 NOTE — PROGRESS NOTE ADULT - PROBLEM SELECTOR PLAN 2
Endo eval appreciated.  Per Endo: Clinically no evidence of adrenal insufficiency. Low cortisol level in this case most likely due to low CBG from cirrhosis.  will repeat an ACTH and Cortisol in the morning and will check a CBG level tomorrow.

## 2020-03-14 NOTE — PROGRESS NOTE ADULT - ASSESSMENT
64M with DM2,  dyslipidemia, obesity, BPH, Orthostatic hypotension, GERD, HTN, Neuropathy, Obesity, HFpEF with mild LV diastolic dysfunction, and decompensated JEAN cirrhosis complicated by ascites, history of SBP, hepatic encephalopathy, and chronic anemia with a history of duodenal ulcer as well as GAVE and duodenal AVM s/p APC (last on 10/11/19), who is UNOS listed for liver transplantation at [facility] who presents with fall at home, general weakness and ? syncope 2/2 sepsis from UTI suspected prostatitis.

## 2020-03-14 NOTE — PROGRESS NOTE ADULT - SUBJECTIVE AND OBJECTIVE BOX
[facility] Division of Hospital Medicine  Andre J. Reyes  Pager:[phone]    Patient is a 64y old  Male who presents with a chief complaint of Syncopal episode at home at 0430 this am, LOC for approx 3 minutes per wife, pt had episode of shaking during that time. Hx of multiple falls. Pt reporting abdominal pain for X 1day, no nausea or vomiting, endorses loose stool on lactulose at home (14 Mar 2020 13:38)      SUBJECTIVE / OVERNIGHT EVENTS:  no complaints today.  asked me to speak with his wife to explain what is going on.    ROS: ( - ) Fever, ( - )Chills,  ( - )Nausea/Vomiting, ( - ) Cough, ( - )Shortness of breath, ( - )Chest Pain    ADDITIONAL REVIEW OF SYSTEMS:    MEDICATIONS  (STANDING):  albumin human 25% IVPB 100 milliLiter(s) IV Intermittent every 8 hours  chlorhexidine 2% Cloths 1 Application(s) Topical daily  dextrose 5%. 1000 milliLiter(s) (50 mL/Hr) IV Continuous <Continuous>  dextrose 50% Injectable 12.5 Gram(s) IV Push once  dextrose 50% Injectable 25 Gram(s) IV Push once  folic acid 1 milliGRAM(s) Oral daily  insulin glargine Injectable (LANTUS) 30 Unit(s) SubCutaneous at bedtime  insulin lispro (HumaLOG) corrective regimen sliding scale   SubCutaneous at bedtime  insulin lispro (HumaLOG) corrective regimen sliding scale   SubCutaneous three times a day before meals  insulin lispro Injectable (HumaLOG) 12 Unit(s) SubCutaneous three times a day before meals  lactulose Syrup 20 Gram(s) Oral three times a day  meropenem  IVPB 1000 milliGRAM(s) IV Intermittent every 8 hours  midodrine. 30 milliGRAM(s) Oral three times a day  pantoprazole    Tablet 40 milliGRAM(s) Oral before breakfast  rifAXIMin 550 milliGRAM(s) Oral two times a day  tamsulosin 0.4 milliGRAM(s) Oral at bedtime    MEDICATIONS  (PRN):  dextrose 40% Gel 15 Gram(s) Oral once PRN Blood Glucose LESS THAN 70 milliGRAM(s)/deciliter  glucagon  Injectable 1 milliGRAM(s) IntraMuscular once PRN Glucose LESS THAN 70 milligrams/deciliter  ondansetron Injectable 4 milliGRAM(s) IV Push every 6 hours PRN Nausea      T(C): 36.7 (03-14 @ 11:47), Max: 36.7 (03-13 @ 21:26)   HR: 83   BP: 114/64   RR: 18   SpO2: 98%    PHYSICAL EXAM:    CONSTITUTIONAL: NAD, well-developed, obese, ill appearing, jaundice, scleral icterus  RESPIRATORY: Normal respiratory effort; lungs are clear to auscultation bilaterally  CARDIOVASCULAR: Regular rate and rhythm, normal S1 and S2, no murmur/rub/gallop; No lower extremity edema; Peripheral pulses are 2+ bilaterally  ABDOMEN: Nontender to palpation, normoactive bowel sounds, no rebound/guarding; No hepatosplenomegaly no asterxis  : purulent draining from bob   MUSCLOSKELETAL: no clubbing or cyanosis of digits; no joint swelling or tenderness to palpation  PSYCH: A+O to person, place, and time; affect appropriate  I&O's Summary    13 Mar 2020 07:01  -  14 Mar 2020 07:00  --------------------------------------------------------  IN: 1190 mL / OUT: 1500 mL / NET: -310 mL    14 Mar 2020 07:01  -  14 Mar 2020 16:33  --------------------------------------------------------  IN: 480 mL / OUT: 0 mL / NET: 480 mL        LABS:                        7.4    6.59  )-----------( 75       ( 14 Mar 2020 06:49 )             22.7     03-14    130<L>  |  96  |  20  ----------------------------<  264<H>  4.5   |  26  |  0.93    Ca    9.7      14 Mar 2020 06:49    TPro  5.9<L>  /  Alb  3.3  /  TBili  7.4<H>  /  DBili  x   /  AST  18  /  ALT  13  /  AlkPhos  183<H>  03-14    PT/INR - ( 14 Mar 2020 09:07 )   PT: 25.2 sec;   INR: 2.16 ratio         PTT - ( 14 Mar 2020 09:07 )  PTT:51.2 sec          CAPILLARY BLOOD GLUCOSE      POCT Blood Glucose.: 234 mg/dL (14 Mar 2020 12:38)  POCT Blood Glucose.: 261 mg/dL (14 Mar 2020 08:30)  POCT Blood Glucose.: 263 mg/dL (13 Mar 2020 21:39)  POCT Blood Glucose.: 337 mg/dL (13 Mar 2020 17:28)      RADIOLOGY & ADDITIONAL TESTS:  Results Reviewed:   Imaging Personally Reviewed:  Electrocardiogram Personally Reviewed:    COORDINATION OF CARE:  Care Discussed with Consultants/Other Providers [Y/N]:  Prior or Outpatient Records Reviewed [Y/N]:

## 2020-03-14 NOTE — PROGRESS NOTE ADULT - ASSESSMENT
63 yo M with IDDM, dyslipidemia, obesity, GERD, HFpEF with mild LV diastolic dysfunction, and decompensated JEAN cirrhosis complicated by ascites, history of SBP, hepatic encephalopathy, and chronic anemia with a history of duodenal ulcer as well as GAVE and duodenal AVM s/p APC (last on 10/11/19), who is UNOS listed for liver transplantation at [facility]. He has had multiple recent hospitalizations, including hospitalization from 2/11/20-2/21/20 due to septic shock secondary to emphysematous cystitis and ESBL E. coli bacteremia and from 2/28/20-3/3/20 after a mechanical fall. He is currently re-admitted after another mechanical fall likely preceded by vasovagal syncope, with evidence of urosepsis as well as mild hepatic encephalopathy and mild MISA.    Impression:  #JEAN Cirrhosis: UNOS listed for liver transplant. MELD-Na 26 3/13  Decompensated by esophageal varices, ascites, h/o hepatic encephalopathy and h/o SBP.   -Ascites: Trace on CT abdomen/pelvis from 02/11/20  -Varices: Small non-bleeding EV on EGD from 12/2019.  -HCC: No focal liver lesions on MRI abdomen 12/2019.   -PSE: No asterixis.     #Recurrent urosepsis with  large prostatic abscess.  Emphesematous cystitis seen on during prior hospitalization with hx of ESBL Ecoli. CT scan during this admission showing enlarged prostate concerning for abscess. There is concern for possible colovesicular fistula.  Bcx from 3/10 negative  Ucx from 3/10 showing >100,000 ESBL Ecoli  CT pelvis with IV contrast showing diffusely enlarged prostate with extravasation of contrast concerning for fistula      #Recurrent falls: Differential includes deconditioning vs. diabetic neuropathy.   Previous work up from previous hospitalization:    -TTE unremarkable this admission, less likely cardiac cause. CT head without acute neurologic findings.   -Mental status at baseline on lactulose    Recommendations:  - follow up urology recs- drainage in OR  - cw with abx  - Hold diuretics for now  - Continue with midodrine 30mg TID  - Continue Lactulose and Rifaximin 63 yo M with IDDM, dyslipidemia, obesity, GERD, HFpEF with mild LV diastolic dysfunction, and decompensated JEAN cirrhosis complicated by ascites, history of SBP, hepatic encephalopathy, and chronic anemia with a history of duodenal ulcer as well as GAVE and duodenal AVM s/p APC (last on 10/11/19), who is UNOS listed for liver transplantation at [facility]. He has had multiple recent hospitalizations, including hospitalization from 2/11/20-2/21/20 due to septic shock secondary to emphysematous cystitis and ESBL E. coli bacteremia and from 2/28/20-3/3/20 after a mechanical fall. He is currently re-admitted after another mechanical fall likely preceded by vasovagal syncope, with evidence of urosepsis as well as mild hepatic encephalopathy and mild MISA.    Impression:  #JEAN Cirrhosis: UNOS listed for liver transplant. MELD-Na 26 3/13  Decompensated by esophageal varices, ascites, h/o hepatic encephalopathy and h/o SBP.   -Ascites: Trace on CT abdomen/pelvis from 02/11/20  -Varices: Small non-bleeding EV on EGD from 12/2019.  -HCC: No focal liver lesions on MRI abdomen 12/2019.   -PSE: No asterixis.     #Recurrent urosepsis with  large prostatic abscess.  Emphesematous cystitis seen on during prior hospitalization with hx of ESBL Ecoli. CT scan during this admission showing enlarged prostate concerning for abscess. There is concern for possible colovesicular fistula.  Bcx from 3/10 negative  Ucx from 3/10 showing >100,000 ESBL Ecoli  CT pelvis with IV contrast showing diffusely enlarged prostate with extravasation of contrast concerning for fistula      #Recurrent falls: Differential includes deconditioning vs. diabetic neuropathy.   Previous work up from previous hospitalization:    -TTE unremarkable this admission, less likely cardiac cause. CT head without acute neurologic findings.   -Mental status at baseline on lactulose    Recommendations:  - Discontinue IV albumin   - follow up urology recs- drainage in OR  - cw with abx  - Hold diuretics for now  - Continue with midodrine 30mg TID  - Continue Lactulose and Rifaximin 65 yo M with IDDM, dyslipidemia, obesity, GERD, HFpEF with mild LV diastolic dysfunction, and decompensated JEAN cirrhosis complicated by ascites, history of SBP, hepatic encephalopathy, and chronic anemia with a history of duodenal ulcer as well as GAVE and duodenal AVM s/p APC (last on 10/11/19), who is UNOS listed for liver transplantation at [facility]. He has had multiple recent hospitalizations, including hospitalization from 2/11/20-2/21/20 due to septic shock secondary to emphysematous cystitis and ESBL E. coli bacteremia and from 2/28/20-3/3/20 after a mechanical fall. He is currently re-admitted after another mechanical fall likely preceded by vasovagal syncope, with evidence of urosepsis as well as mild hepatic encephalopathy and mild MISA.    Impression:  #JEAN Cirrhosis: UNOS listed for liver transplant. MELD-Na 26 on 3/13  Decompensated by esophageal varices, ascites, h/o hepatic encephalopathy and h/o SBP.   -Ascites: Trace on CT abdomen/pelvis from 02/11/20  -Varices: Small non-bleeding EV on EGD from 12/2019.  -HCC: No focal liver lesions on MRI abdomen 12/2019.   -PSE: No asterixis.     #Recurrent urosepsis with  large prostatic abscess.  Emphesematous cystitis seen on during prior hospitalization with hx of ESBL Ecoli. CT scan during this admission showing enlarged prostate concerning for abscess. There is concern for possible colovesicular fistula.  Bcx from 3/10 negative  Ucx from 3/10 showing >100,000 ESBL Ecoli  CT pelvis with IV contrast showing diffusely enlarged prostate with extravasation of contrast concerning for fistula      #Recurrent falls: Differential includes deconditioning vs. diabetic neuropathy.   Previous work up from previous hospitalization:    -TTE unremarkable this admission, less likely cardiac cause. CT head without acute neurologic findings.   -Mental status at baseline on lactulose    Recommendations:  - Discontinue IV albumin   - follow up urology recs- drainage in OR  - cw with abx  - Hold diuretics for now  - Continue with midodrine 30mg TID  - Continue Lactulose and Rifaximin

## 2020-03-14 NOTE — PROGRESS NOTE ADULT - SUBJECTIVE AND OBJECTIVE BOX
Subjective    Feeling well, no complaints.  Remains afebrile.    Objective    Vital signs  T(F): , Max: 98 (03-13-20 @ 12:10)  HR: 80 (03-14-20 @ 04:28)  BP: 109/61 (03-14-20 @ 04:28)  SpO2: 99% (03-14-20 @ 04:28)  Wt(kg): --    Output     OUT:    Indwelling Catheter - Urethral: 1500 mL  Total OUT: 1500 mL    Total NET: -1500 mL          Physical Exam  Gen NAD  Abd soft   bob draining slightly cloudy urine    Labs      03-14 @ 06:49    WBC 6.59  / Hct 22.7  / SCr 0.93     03-13 @ 07:07    WBC 7.97  / Hct 20.9  / SCr 1.01       Urine Cx:   Culture - Urine (03.10.20 @ 17:23)    -  Tigecycline: S <=2    -  Piperacillin/Tazobactam: R <=16    -  Trimethoprim/Sulfamethoxazole: S <=2/38    -  Tobramycin: S <=4    -  Ceftriaxone: R >32 Enterobacter, Citrobacter, and Serratia may develop resistance during prolonged therapy    -  Ciprofloxacin: R >2    -  Ertapenem: S <=1    -  Gentamicin: S <=4    -  Imipenem: S <=1    -  Aztreonam: R >16    -  Cefepime: R >16    -  Amikacin: S <=16    -  Ampicillin/Sulbactam: R 16/8 Enterobacter, Citrobacter, and Serratia may develop resistance during prolonged therapy (3-4 days)    -  Levofloxacin: R >4    -  Meropenem: S <=1    -  Nitrofurantoin: S <=32 Should not be used to treat pyelonephritis    -  Ampicillin: R >16 These ampicillin results predict results for amoxicillin    -  Cefazolin: R >16 (MIC_CL_[id number]) For uncomplicated UTI with K. pneumoniae, E. coli, or P. mirablis: LEONARDO <=16 is sensitive and LEONARDO >=32 is resistant. This also predicts results for oral agents cefaclor, cefdinir, cefpodoxime, cefprozil, cefuroxime axetil, cephalexin and locarbef for uncomplicated UTI. Note that some isolates may be susceptible to these agents while testing resistant to cefazolin.    -  Cefoxitin: S <=8    Specimen Source: .Urine Clean Catch (Midstream)    Culture Results:   >100,000 CFU/ml Escherichia coli ESBL    Organism Identification: Escherichia coli ESBL    Organism: Escherichia coli ESBL    Method Type: LEONARDO

## 2020-03-14 NOTE — PROGRESS NOTE ADULT - ASSESSMENT
· Assessment		  63yo M with IDDM, dyslipidemia, obesity, GERD, HFpEF with mild LV diastolic dysfunction, and decompensated JEAN cirrhosis complicated by ascites, history of SBP, hepatic encephalopathy, and chronic anemia with a history of duodenal ulcer as well as GAVE and duodenal AVM s/p APC (last on 10/11/19), who is UNOS listed for liver transplantation at [facility] who presents with fall at home. Hematology consulted for his hx of anemia/thrombocytopenia       Anemia -- had extensive w/u in the past. [place] to be related to ACD. Also with MGUS  -- hgb adequate  -- transfuse for hgb <7  -- monitor CBC    MGUS -- check SPEP/DELORES    thrombocytopenia -- chronic and stable, likely due to cirrhosis  -- can be exacerbated by acute events  -- monitor CBC  -- transfuse for plts <10 if no bleeding, <50 if with bleeding    Prostate abscess  -continue abx  -f/u /ID  -for possible drainage    Thank you for the courtesy of this consultation and we will continue to follow.    Juan Carlos Rudd MD  [facility]  Cell: [phone]

## 2020-03-14 NOTE — PROGRESS NOTE ADULT - ATTENDING COMMENTS
f/u CT scan with contrast confirms mass a large prostatic abscess.  reviewed next steps with patient plan for TUR Prostate abscess in OR 3/15/20

## 2020-03-14 NOTE — PROGRESS NOTE ADULT - SUBJECTIVE AND OBJECTIVE BOX
Interval Events:   Afebrile   Hospital Medications:  albumin human 25% IVPB 100 milliLiter(s) IV Intermittent every 8 hours  chlorhexidine 2% Cloths 1 Application(s) Topical daily  dextrose 40% Gel 15 Gram(s) Oral once PRN  dextrose 5%. 1000 milliLiter(s) IV Continuous <Continuous>  dextrose 50% Injectable 12.5 Gram(s) IV Push once  dextrose 50% Injectable 25 Gram(s) IV Push once  folic acid 1 milliGRAM(s) Oral daily  glucagon  Injectable 1 milliGRAM(s) IntraMuscular once PRN  insulin glargine Injectable (LANTUS) 25 Unit(s) SubCutaneous at bedtime  insulin lispro (HumaLOG) corrective regimen sliding scale   SubCutaneous at bedtime  insulin lispro (HumaLOG) corrective regimen sliding scale   SubCutaneous three times a day before meals  insulin lispro Injectable (HumaLOG) 10 Unit(s) SubCutaneous three times a day before meals  lactulose Syrup 20 Gram(s) Oral three times a day  meropenem  IVPB 1000 milliGRAM(s) IV Intermittent every 8 hours  midodrine. 30 milliGRAM(s) Oral three times a day  ondansetron Injectable 4 milliGRAM(s) IV Push every 6 hours PRN  pantoprazole    Tablet 40 milliGRAM(s) Oral before breakfast  rifAXIMin 550 milliGRAM(s) Oral two times a day  tamsulosin 0.4 milliGRAM(s) Oral at bedtime        ROS:   General:  No fevers, chills or night sweats  ENT:  No sore throat or dysphagia  CV:  No pain or palpitations  Resp:  No dyspnea, cough or  wheezing  GI:  as above  Skin:  No rash or edema  Neuro: no weakness   Hematologic: no bleeding  Musculoskeletal: no muscle pain or join pain  Psych: no agitation      PHYSICAL EXAM:   Vital Signs:  Vital Signs Last 24 Hrs  T(C): 36.4 (14 Mar 2020 04:28), Max: 36.7 (13 Mar 2020 12:10)  T(F): 97.5 (14 Mar 2020 04:28), Max: 98 (13 Mar 2020 12:10)  HR: 80 (14 Mar 2020 04:28) (80 - 89)  BP: 109/61 (14 Mar 2020 04:28) (109/61 - 128/73)  BP(mean): --  RR: 18 (14 Mar 2020 04:28) (18 - 18)  SpO2: 99% (14 Mar 2020 04:28) (97% - 99%)  Daily     Daily Weight in k.2 (14 Mar 2020 04:28)    GENERAL:  Appears stated age, well-groomed, well-nourished, no distress  HEENT:  NC/AT,  +sclera icteric  CHEST:  Full & symmetric excursion, no increased effort, breath sounds clear  HEART:  Regular rhythm, S1, S2, no JVD  ABDOMEN:  Soft, non-tender, non-distended, normoactive bowel sounds  EXTREMITIES:  no cyanosis, clubbing or edema  SKIN:  Jaundiced  NEURO:  Alert, oriented, +faint asterixis  LABS:                        7.6    7.97  )-----------( 58       ( 13 Mar 2020 07:07 )             20.9     Mean Cell Volume: 113.6 fl (-20 @ 07:07)    0313    133<L>  |  96  |  19  ----------------------------<  205<H>  4.2   |  26  |  1.01    Ca    9.6      13 Mar 2020 07:07  Mg     1.8     03-12    TPro  5.7<L>  /  Alb  3.0<L>  /  TBili  8.2<H>  /  DBili  x   /  AST  16  /  ALT  12  /  AlkPhos  146<H>  03-13    LIVER FUNCTIONS - ( 13 Mar 2020 07:07 )  Alb: 3.0 g/dL / Pro: 5.7 g/dL / ALK PHOS: 146 U/L / ALT: 12 U/L / AST: 16 U/L / GGT: x           PT/INR - ( 13 Mar 2020 08:45 )   PT: 26.1 sec;   INR: 2.21 ratio         PTT - ( 13 Mar 2020 08:45 )  PTT:48.2 sec                            7.6    7.97  )-----------( 58       ( 13 Mar 2020 07:07 )             20.9                         7.6    8.44  )-----------( 70       ( 12 Mar 2020 07:34 )             23.1                         8.0    11.76 )-----------( 84       ( 11 Mar 2020 06:36 )             23.6       Imaging:

## 2020-03-14 NOTE — PROGRESS NOTE ADULT - PROBLEM SELECTOR PLAN 4
General with urinary symptoms with leucocytosis and abnormal urinalysis   Prolonged QTC, cardio following for this will avoid prolonging medications  Daily EKG for prolonged QTC

## 2020-03-14 NOTE — CHART NOTE - NSCHARTNOTEFT_GEN_A_CORE
Patient unable to be seen at bedside. BG and chart reviewed, FS elevated. Would increase lantus to 30 units qhs and humalog to 12 units TID. Repeat Cortisol, ACTH, and CBG ordered for AM as well.    If further questions, please call endocrine at [phone]

## 2020-03-14 NOTE — PROGRESS NOTE ADULT - PROBLEM SELECTOR PLAN 9
blood glucose is above goal  endo recommending to increase lantus to 30unit and increase humalog to 12unit  A1C = 6.4 on 2/2020   cont glucose monitoring   Cont Lantus 12 units at night / Humalog 6 units before meals -adjust based on daily usage.   Glucose consistent diet   Insulin sliding scale

## 2020-03-14 NOTE — PROGRESS NOTE ADULT - PROBLEM SELECTOR PLAN 1
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 Coli.  Will continue Meropenem per ID recs   Urology planning a TURP and drainage of abscess

## 2020-03-14 NOTE — PROGRESS NOTE ADULT - ATTENDING COMMENTS
PreOp Risk assessment:    Cardiovascular Risk Stratification - RCRI =  ( 2 points ).  1. History of ischemic heart disease - N  2. History of congestive heart failure - Y  3. History of cerebrovascular disease (stroke or transient ischemic attack)  - N  4. History of diabetes requiring preoperative insulin use - Y  5. Chronic kidney disease (creatinine > 2 mg/dL) - N  6. Undergoing suprainguinal vascular, intraperitoneal, or intrathoracic surgery  - N  0 predictors = 3.9%, 1 predictor = 6.0 %, 2 predictors = 10.1 %, 3 predictors = >15%    - Overall this patient has 10.1 % risk for cardiac death, nonfatal myocardial infarction, and nonfatal cardiac arrest perioperatively for this low  risk procedure)    - Per Cardiology No signs of significant ischemia. Cath with non obs disease  - echo in 2/2020 with normal LV function . no need to repeat.   - EKG from today with NSR and a Qtc of 474  - No further cardiac testing needed.    will decrease insulin dose from lantus 30u to 15units and will give check blood glucose q6hrs while NPO for procedure then can return insulin regimen back to: Lantus 30u and Humalog 12 u AC

## 2020-03-14 NOTE — PROGRESS NOTE ADULT - ASSESSMENT
64M with DM2,  dyslipidemia, obesity, BPH, Orthostatic hypotension, GERD, HTN, Neuropathy, Obesity, HFpEF with mild LV diastolic dysfunction, and decompensated JEAN cirrhosis complicated by ascites, history of SBP, hepatic encephalopathy, and chronic anemia with a history of duodenal ulcer as well as GAVE and duodenal AVM s/p APC (last on 10/11/19), who is UNOS listed for liver transplantation at [facility] who presents with fall at home, general weakness and ? syncope in setting of UTI    - Syncope is most likely vagal in setting of urinary tract infection vs prostatic infection/abscess  - remains orthostatic  - Hold diuretics as not vol ol  - Midodrine increased to 30mg q8 but unclear how much more efficacy he will get from this dose.   - I doubt this was arrhythmic but his prolonged QTC has been concerning. This was seen on his previous admission as well  - Avoid QTC prolonging drugs  - qtc ok as of 3/13  - Monitor and replete electrolytes. Keep K>4.0 and Mg>2.0. Replete Mg today  - cont abx    - No signs of significant ischemia. Cath with non obs disease  - echo in 2/2020 with normal LV function. No need to repeat.   - F/U Gi   - urology for possible prostate abscess    - Further cardiac workup will depend on clinical course.   - All other workup per primary team. Will followup.

## 2020-03-14 NOTE — PROGRESS NOTE ADULT - SUBJECTIVE AND OBJECTIVE BOX
GASTROENTEROLOGY    Patient seen and examined  No acute events noted  Denies nausea/vomiting  Tolerating diet  Afebrile    MEDICATIONS  (STANDING):  albumin human 25% IVPB 100 milliLiter(s) IV Intermittent every 8 hours  chlorhexidine 2% Cloths 1 Application(s) Topical daily  dextrose 5%. 1000 milliLiter(s) (50 mL/Hr) IV Continuous <Continuous>  dextrose 50% Injectable 12.5 Gram(s) IV Push once  dextrose 50% Injectable 25 Gram(s) IV Push once  folic acid 1 milliGRAM(s) Oral daily  insulin glargine Injectable (LANTUS) 25 Unit(s) SubCutaneous at bedtime  insulin lispro (HumaLOG) corrective regimen sliding scale   SubCutaneous at bedtime  insulin lispro (HumaLOG) corrective regimen sliding scale   SubCutaneous three times a day before meals  insulin lispro Injectable (HumaLOG) 10 Unit(s) SubCutaneous three times a day before meals  lactulose Syrup 20 Gram(s) Oral three times a day  meropenem  IVPB 1000 milliGRAM(s) IV Intermittent every 8 hours  midodrine. 30 milliGRAM(s) Oral three times a day  pantoprazole    Tablet 40 milliGRAM(s) Oral before breakfast  rifAXIMin 550 milliGRAM(s) Oral two times a day  tamsulosin 0.4 milliGRAM(s) Oral at bedtime        T(F): 98 (03-14-20 @ 11:47), Max: 98 (03-13-20 @ 21:26)  HR: 83 (03-14-20 @ 11:47) (80 - 89)  BP: 114/64 (03-14-20 @ 11:47) (109/61 - 120/63)  RR: 18 (03-14-20 @ 11:47) (18 - 18)  SpO2: 98% (03-14-20 @ 11:47) (97% - 99%)  Wt(kg): --    PHYSICAL EXAM  GENERAL:   NAD  HEENT:  NC/AT, no JVD, sclera-anicteric  CHEST:  clear to ascultation bilaterally, respirations nonlabored  HEART:  +S1+S2   ABDOMEN:  Soft, non-tender, non-distended, + bowel sounds  EXTREMITIES:  no cyanosis, clubbing or edema  NEURO:  Alert, responsive  SKIN:  No rash/warm/dry      LABS:                        7.4<L>  6.59  )-----------( 75<L>    ( 14 Mar 2020 06:49 )             22.7<L>  14 Mar 2020 06:49    03-14    130<L>  |  96  |  20  ----------------------------<  264<H>  4.5   |  26  |  0.93    Ca    9.7      14 Mar 2020 06:49    TPro  5.9<L>  /  Alb  3.3  /  TBili  7.4<H>  /  DBili  x   /  AST  18  /  ALT  13  /  AlkPhos  183<H>  03-14    LIVER FUNCTIONS - ( 14 Mar 2020 06:49 )  Alb: 3.3 g/dL / Pro: 5.9 g/dL / ALK PHOS: 183 U/L / ALT: 13 U/L / AST: 18 U/L / GGT: x           PT/INR - ( 14 Mar 2020 09:07 )   PT: 25.2 sec;   INR: 2.16 ratio         PTT - ( 14 Mar 2020 09:07 )  PTT:51.2 sec  I&O's Detail    13 Mar 2020 07:01  -  14 Mar 2020 07:00  --------------------------------------------------------  IN:    IV PiggyBack: 250 mL    Oral Fluid: 840 mL    Solution: 100 mL  Total IN: 1190 mL    OUT:    Indwelling Catheter - Urethral: 1500 mL  Total OUT: 1500 mL    Total NET: -310 mL      14 Mar 2020 07:01  -  14 Mar 2020 13:38  --------------------------------------------------------  IN:    Oral Fluid: 240 mL  Total IN: 240 mL    OUT:  Total OUT: 0 mL    Total NET: 240 mL

## 2020-03-15 ENCOUNTER — RESULT REVIEW (OUTPATIENT)
Age: 65
End: 2020-03-15

## 2020-03-15 LAB
ALBUMIN SERPL ELPH-MCNC: 3.1 G/DL — LOW (ref 3.3–5)
ALBUMIN SERPL ELPH-MCNC: 3.3 G/DL — SIGNIFICANT CHANGE UP (ref 3.3–5)
ALBUMIN SERPL ELPH-MCNC: 3.4 G/DL — SIGNIFICANT CHANGE UP (ref 3.3–5)
ALP SERPL-CCNC: 102 U/L — SIGNIFICANT CHANGE UP (ref 40–120)
ALP SERPL-CCNC: 155 U/L — HIGH (ref 40–120)
ALP SERPL-CCNC: 91 U/L — SIGNIFICANT CHANGE UP (ref 40–120)
ALT FLD-CCNC: 13 U/L — SIGNIFICANT CHANGE UP (ref 10–45)
ALT FLD-CCNC: 16 U/L — SIGNIFICANT CHANGE UP (ref 10–45)
ALT FLD-CCNC: 20 U/L — SIGNIFICANT CHANGE UP (ref 10–45)
ANION GAP SERPL CALC-SCNC: 10 MMOL/L — SIGNIFICANT CHANGE UP (ref 5–17)
ANION GAP SERPL CALC-SCNC: 27 MMOL/L — HIGH (ref 5–17)
ANION GAP SERPL CALC-SCNC: 28 MMOL/L — HIGH (ref 5–17)
ANISOCYTOSIS BLD QL: SLIGHT — SIGNIFICANT CHANGE UP
APTT BLD: 41.7 SEC — HIGH (ref 27.5–36.3)
AST SERPL-CCNC: 18 U/L — SIGNIFICANT CHANGE UP (ref 10–40)
AST SERPL-CCNC: 31 U/L — SIGNIFICANT CHANGE UP (ref 10–40)
AST SERPL-CCNC: 75 U/L — HIGH (ref 10–40)
BASOPHILS # BLD AUTO: 0.32 K/UL — HIGH (ref 0–0.2)
BASOPHILS NFR BLD AUTO: 1 % — SIGNIFICANT CHANGE UP (ref 0–2)
BILIRUB DIRECT SERPL-MCNC: 1.5 MG/DL — HIGH (ref 0–0.2)
BILIRUB INDIRECT FLD-MCNC: 5.3 MG/DL — HIGH (ref 0.2–1)
BILIRUB SERPL-MCNC: 6.8 MG/DL — HIGH (ref 0.2–1.2)
BILIRUB SERPL-MCNC: 7.1 MG/DL — HIGH (ref 0.2–1.2)
BILIRUB SERPL-MCNC: 8.4 MG/DL — HIGH (ref 0.2–1.2)
BUN SERPL-MCNC: 20 MG/DL — SIGNIFICANT CHANGE UP (ref 7–23)
BUN SERPL-MCNC: 24 MG/DL — HIGH (ref 7–23)
BUN SERPL-MCNC: 24 MG/DL — HIGH (ref 7–23)
BURR CELLS BLD QL SMEAR: PRESENT — SIGNIFICANT CHANGE UP
CALCIUM SERPL-MCNC: 10.2 MG/DL — SIGNIFICANT CHANGE UP (ref 8.4–10.5)
CALCIUM SERPL-MCNC: 9.6 MG/DL — SIGNIFICANT CHANGE UP (ref 8.4–10.5)
CALCIUM SERPL-MCNC: 9.8 MG/DL — SIGNIFICANT CHANGE UP (ref 8.4–10.5)
CHLORIDE SERPL-SCNC: 100 MMOL/L — SIGNIFICANT CHANGE UP (ref 96–108)
CHLORIDE SERPL-SCNC: 100 MMOL/L — SIGNIFICANT CHANGE UP (ref 96–108)
CHLORIDE SERPL-SCNC: 98 MMOL/L — SIGNIFICANT CHANGE UP (ref 96–108)
CK MB BLD-MCNC: 0.7 % — SIGNIFICANT CHANGE UP (ref 0–3.5)
CK MB CFR SERPL CALC: 2.3 NG/ML — SIGNIFICANT CHANGE UP (ref 0–6.7)
CK SERPL-CCNC: 352 U/L — HIGH (ref 30–200)
CO2 SERPL-SCNC: 10 MMOL/L — CRITICAL LOW (ref 22–31)
CO2 SERPL-SCNC: 11 MMOL/L — LOW (ref 22–31)
CO2 SERPL-SCNC: 26 MMOL/L — SIGNIFICANT CHANGE UP (ref 22–31)
CORTIS AM PEAK SERPL-MCNC: 2.5 UG/DL — LOW (ref 6–18.4)
CREAT SERPL-MCNC: 0.97 MG/DL — SIGNIFICANT CHANGE UP (ref 0.5–1.3)
CREAT SERPL-MCNC: 1.53 MG/DL — HIGH (ref 0.5–1.3)
CREAT SERPL-MCNC: 1.56 MG/DL — HIGH (ref 0.5–1.3)
CULTURE RESULTS: SIGNIFICANT CHANGE UP
CULTURE RESULTS: SIGNIFICANT CHANGE UP
EOSINOPHIL # BLD AUTO: 0 K/UL — SIGNIFICANT CHANGE UP (ref 0–0.5)
EOSINOPHIL NFR BLD AUTO: 0 % — SIGNIFICANT CHANGE UP (ref 0–6)
FIBRINOGEN PPP-MCNC: 142 MG/DL — LOW (ref 350–510)
GAS PNL BLDA: SIGNIFICANT CHANGE UP
GAS PNL BLDA: SIGNIFICANT CHANGE UP
GLUCOSE BLDC GLUCOMTR-MCNC: 106 MG/DL — HIGH (ref 70–99)
GLUCOSE BLDC GLUCOMTR-MCNC: 122 MG/DL — HIGH (ref 70–99)
GLUCOSE BLDC GLUCOMTR-MCNC: 182 MG/DL — HIGH (ref 70–99)
GLUCOSE SERPL-MCNC: 147 MG/DL — HIGH (ref 70–99)
GLUCOSE SERPL-MCNC: 180 MG/DL — HIGH (ref 70–99)
GLUCOSE SERPL-MCNC: 88 MG/DL — SIGNIFICANT CHANGE UP (ref 70–99)
HCT VFR BLD CALC: 20 % — CRITICAL LOW (ref 39–50)
HCT VFR BLD CALC: 21.6 % — LOW (ref 39–50)
HCT VFR BLD CALC: 27.9 % — LOW (ref 39–50)
HGB BLD-MCNC: 6.3 G/DL — CRITICAL LOW (ref 13–17)
HGB BLD-MCNC: 7.4 G/DL — LOW (ref 13–17)
HGB BLD-MCNC: 8.7 G/DL — LOW (ref 13–17)
HYPOCHROMIA BLD QL: SLIGHT — SIGNIFICANT CHANGE UP
INR BLD: 2 RATIO — HIGH (ref 0.88–1.16)
INR BLD: 2.27 RATIO — HIGH (ref 0.88–1.16)
LYMPHOCYTES # BLD AUTO: 0.95 K/UL — LOW (ref 1–3.3)
LYMPHOCYTES # BLD AUTO: 3 % — LOW (ref 13–44)
MACROCYTES BLD QL: SIGNIFICANT CHANGE UP
MAGNESIUM SERPL-MCNC: 1.7 MG/DL — SIGNIFICANT CHANGE UP (ref 1.6–2.6)
MAGNESIUM SERPL-MCNC: 1.8 MG/DL — SIGNIFICANT CHANGE UP (ref 1.6–2.6)
MANUAL SMEAR VERIFICATION: SIGNIFICANT CHANGE UP
MCHC RBC-ENTMCNC: 31.2 GM/DL — LOW (ref 32–36)
MCHC RBC-ENTMCNC: 31.5 GM/DL — LOW (ref 32–36)
MCHC RBC-ENTMCNC: 32.6 PG — SIGNIFICANT CHANGE UP (ref 27–34)
MCHC RBC-ENTMCNC: 34.3 GM/DL — SIGNIFICANT CHANGE UP (ref 32–36)
MCHC RBC-ENTMCNC: 37.1 PG — HIGH (ref 27–34)
MCHC RBC-ENTMCNC: 37.4 PG — HIGH (ref 27–34)
MCV RBC AUTO: 104.5 FL — HIGH (ref 80–100)
MCV RBC AUTO: 109.1 FL — HIGH (ref 80–100)
MCV RBC AUTO: 117.6 FL — HIGH (ref 80–100)
MONOCYTES # BLD AUTO: 0.95 K/UL — HIGH (ref 0–0.9)
MONOCYTES NFR BLD AUTO: 3 % — SIGNIFICANT CHANGE UP (ref 2–14)
NEUTROPHILS # BLD AUTO: 29.36 K/UL — HIGH (ref 1.8–7.4)
NEUTROPHILS NFR BLD AUTO: 71 % — SIGNIFICANT CHANGE UP (ref 43–77)
NEUTS BAND # BLD: 22 % — HIGH (ref 0–8)
NRBC # BLD: 0 /100 WBCS — SIGNIFICANT CHANGE UP (ref 0–0)
NRBC # BLD: 0 /100 WBCS — SIGNIFICANT CHANGE UP (ref 0–0)
NRBC # BLD: 0 /100 — SIGNIFICANT CHANGE UP (ref 0–0)
OVALOCYTES BLD QL SMEAR: SLIGHT — SIGNIFICANT CHANGE UP
PHOSPHATE SERPL-MCNC: 1.5 MG/DL — LOW (ref 2.5–4.5)
PHOSPHATE SERPL-MCNC: 2.1 MG/DL — LOW (ref 2.5–4.5)
PLAT MORPH BLD: NORMAL — SIGNIFICANT CHANGE UP
PLATELET # BLD AUTO: 63 K/UL — LOW (ref 150–400)
PLATELET # BLD AUTO: 68 K/UL — LOW (ref 150–400)
PLATELET # BLD AUTO: 72 K/UL — LOW (ref 150–400)
POIKILOCYTOSIS BLD QL AUTO: SLIGHT — SIGNIFICANT CHANGE UP
POLYCHROMASIA BLD QL SMEAR: SLIGHT — SIGNIFICANT CHANGE UP
POTASSIUM SERPL-MCNC: 4.5 MMOL/L — SIGNIFICANT CHANGE UP (ref 3.5–5.3)
POTASSIUM SERPL-MCNC: 4.7 MMOL/L — SIGNIFICANT CHANGE UP (ref 3.5–5.3)
POTASSIUM SERPL-MCNC: 6.1 MMOL/L — HIGH (ref 3.5–5.3)
POTASSIUM SERPL-SCNC: 4.5 MMOL/L — SIGNIFICANT CHANGE UP (ref 3.5–5.3)
POTASSIUM SERPL-SCNC: 4.7 MMOL/L — SIGNIFICANT CHANGE UP (ref 3.5–5.3)
POTASSIUM SERPL-SCNC: 6.1 MMOL/L — HIGH (ref 3.5–5.3)
PROCALCITONIN SERPL-MCNC: 15.78 NG/ML — HIGH (ref 0.02–0.1)
PROT SERPL-MCNC: 5.2 G/DL — LOW (ref 6–8.3)
PROT SERPL-MCNC: 5.7 G/DL — LOW (ref 6–8.3)
PROT SERPL-MCNC: 5.8 G/DL — LOW (ref 6–8.3)
PROTHROM AB SERPL-ACNC: 23.3 SEC — HIGH (ref 10–12.9)
PROTHROM AB SERPL-ACNC: 26.8 SEC — HIGH (ref 10–13.1)
RBC # BLD: 1.7 M/UL — LOW (ref 4.2–5.8)
RBC # BLD: 1.98 M/UL — LOW (ref 4.2–5.8)
RBC # BLD: 2.67 M/UL — LOW (ref 4.2–5.8)
RBC # FLD: 15.3 % — HIGH (ref 10.3–14.5)
RBC # FLD: 15.9 % — HIGH (ref 10.3–14.5)
RBC # FLD: 19.7 % — HIGH (ref 10.3–14.5)
RBC BLD AUTO: ABNORMAL
SODIUM SERPL-SCNC: 136 MMOL/L — SIGNIFICANT CHANGE UP (ref 135–145)
SODIUM SERPL-SCNC: 136 MMOL/L — SIGNIFICANT CHANGE UP (ref 135–145)
SODIUM SERPL-SCNC: 138 MMOL/L — SIGNIFICANT CHANGE UP (ref 135–145)
SPECIMEN SOURCE: SIGNIFICANT CHANGE UP
SPECIMEN SOURCE: SIGNIFICANT CHANGE UP
TROPONIN T, HIGH SENSITIVITY RESULT: 50 NG/L — SIGNIFICANT CHANGE UP (ref 0–51)
WBC # BLD: 24.8 K/UL — HIGH (ref 3.8–10.5)
WBC # BLD: 31.57 K/UL — HIGH (ref 3.8–10.5)
WBC # BLD: 6.26 K/UL — SIGNIFICANT CHANGE UP (ref 3.8–10.5)
WBC # FLD AUTO: 24.8 K/UL — HIGH (ref 3.8–10.5)
WBC # FLD AUTO: 31.57 K/UL — HIGH (ref 3.8–10.5)
WBC # FLD AUTO: 6.26 K/UL — SIGNIFICANT CHANGE UP (ref 3.8–10.5)

## 2020-03-15 PROCEDURE — 88305 TISSUE EXAM BY PATHOLOGIST: CPT | Mod: 26

## 2020-03-15 PROCEDURE — 99291 CRITICAL CARE FIRST HOUR: CPT | Mod: 25

## 2020-03-15 PROCEDURE — 93010 ELECTROCARDIOGRAM REPORT: CPT

## 2020-03-15 PROCEDURE — 36620 INSERTION CATHETER ARTERY: CPT | Mod: GC,79

## 2020-03-15 PROCEDURE — 99291 CRITICAL CARE FIRST HOUR: CPT

## 2020-03-15 PROCEDURE — 36556 INSERT NON-TUNNEL CV CATH: CPT | Mod: GC,79

## 2020-03-15 PROCEDURE — 52700 DRAINAGE OF PROSTATE ABSCESS: CPT

## 2020-03-15 PROCEDURE — 99232 SBSQ HOSP IP/OBS MODERATE 35: CPT

## 2020-03-15 PROCEDURE — 36620 INSERTION CATHETER ARTERY: CPT

## 2020-03-15 PROCEDURE — 93010 ELECTROCARDIOGRAM REPORT: CPT | Mod: 77

## 2020-03-15 PROCEDURE — 36556 INSERT NON-TUNNEL CV CATH: CPT

## 2020-03-15 PROCEDURE — 99233 SBSQ HOSP IP/OBS HIGH 50: CPT | Mod: 25

## 2020-03-15 RX ORDER — PIPERACILLIN AND TAZOBACTAM 4; .5 G/20ML; G/20ML
3.38 INJECTION, POWDER, LYOPHILIZED, FOR SOLUTION INTRAVENOUS ONCE
Refills: 0 | Status: DISCONTINUED | OUTPATIENT
Start: 2020-03-15 | End: 2020-03-15

## 2020-03-15 RX ORDER — NOREPINEPHRINE BITARTRATE/D5W 8 MG/250ML
0.09 PLASTIC BAG, INJECTION (ML) INTRAVENOUS
Qty: 8 | Refills: 0 | Status: DISCONTINUED | OUTPATIENT
Start: 2020-03-15 | End: 2020-03-17

## 2020-03-15 RX ORDER — VANCOMYCIN HCL 1 G
1000 VIAL (EA) INTRAVENOUS ONCE
Refills: 0 | Status: COMPLETED | OUTPATIENT
Start: 2020-03-15 | End: 2020-03-15

## 2020-03-15 RX ORDER — INSULIN GLARGINE 100 [IU]/ML
30 INJECTION, SOLUTION SUBCUTANEOUS AT BEDTIME
Refills: 0 | Status: DISCONTINUED | OUTPATIENT
Start: 2020-03-15 | End: 2020-03-15

## 2020-03-15 RX ORDER — OXYCODONE HYDROCHLORIDE 5 MG/1
5 TABLET ORAL EVERY 4 HOURS
Refills: 0 | Status: DISCONTINUED | OUTPATIENT
Start: 2020-03-15 | End: 2020-03-15

## 2020-03-15 RX ORDER — FOLIC ACID 0.8 MG
1 TABLET ORAL DAILY
Refills: 0 | Status: DISCONTINUED | OUTPATIENT
Start: 2020-03-15 | End: 2020-03-15

## 2020-03-15 RX ORDER — INSULIN LISPRO 100/ML
VIAL (ML) SUBCUTANEOUS EVERY 6 HOURS
Refills: 0 | Status: DISCONTINUED | OUTPATIENT
Start: 2020-03-15 | End: 2020-03-16

## 2020-03-15 RX ORDER — GLUCAGON INJECTION, SOLUTION 0.5 MG/.1ML
1 INJECTION, SOLUTION SUBCUTANEOUS ONCE
Refills: 0 | Status: DISCONTINUED | OUTPATIENT
Start: 2020-03-15 | End: 2020-03-15

## 2020-03-15 RX ORDER — PHYTONADIONE (VIT K1) 5 MG
10 TABLET ORAL ONCE
Refills: 0 | Status: DISCONTINUED | OUTPATIENT
Start: 2020-03-15 | End: 2020-03-15

## 2020-03-15 RX ORDER — SODIUM CHLORIDE 9 MG/ML
1000 INJECTION, SOLUTION INTRAVENOUS
Refills: 0 | Status: DISCONTINUED | OUTPATIENT
Start: 2020-03-15 | End: 2020-03-15

## 2020-03-15 RX ORDER — INSULIN GLARGINE 100 [IU]/ML
15 INJECTION, SOLUTION SUBCUTANEOUS AT BEDTIME
Refills: 0 | Status: DISCONTINUED | OUTPATIENT
Start: 2020-03-15 | End: 2020-03-15

## 2020-03-15 RX ORDER — MIDODRINE HYDROCHLORIDE 2.5 MG/1
30 TABLET ORAL THREE TIMES A DAY
Refills: 0 | Status: DISCONTINUED | OUTPATIENT
Start: 2020-03-15 | End: 2020-03-15

## 2020-03-15 RX ORDER — PHYTONADIONE (VIT K1) 5 MG
10 TABLET ORAL ONCE
Refills: 0 | Status: COMPLETED | OUTPATIENT
Start: 2020-03-15 | End: 2020-03-15

## 2020-03-15 RX ORDER — MEROPENEM 1 G/30ML
1000 INJECTION INTRAVENOUS EVERY 8 HOURS
Refills: 0 | Status: DISCONTINUED | OUTPATIENT
Start: 2020-03-15 | End: 2020-03-17

## 2020-03-15 RX ORDER — INSULIN GLARGINE 100 [IU]/ML
15 INJECTION, SOLUTION SUBCUTANEOUS AT BEDTIME
Refills: 0 | Status: DISCONTINUED | OUTPATIENT
Start: 2020-03-15 | End: 2020-03-16

## 2020-03-15 RX ORDER — INSULIN LISPRO 100/ML
VIAL (ML) SUBCUTANEOUS
Refills: 0 | Status: DISCONTINUED | OUTPATIENT
Start: 2020-03-15 | End: 2020-03-15

## 2020-03-15 RX ORDER — ONDANSETRON 8 MG/1
4 TABLET, FILM COATED ORAL EVERY 6 HOURS
Refills: 0 | Status: DISCONTINUED | OUTPATIENT
Start: 2020-03-15 | End: 2020-03-15

## 2020-03-15 RX ORDER — SODIUM CHLORIDE 9 MG/ML
1000 INJECTION, SOLUTION INTRAVENOUS
Refills: 0 | Status: DISCONTINUED | OUTPATIENT
Start: 2020-03-15 | End: 2020-03-16

## 2020-03-15 RX ORDER — PIPERACILLIN AND TAZOBACTAM 4; .5 G/20ML; G/20ML
3.38 INJECTION, POWDER, LYOPHILIZED, FOR SOLUTION INTRAVENOUS EVERY 8 HOURS
Refills: 0 | Status: DISCONTINUED | OUTPATIENT
Start: 2020-03-15 | End: 2020-03-15

## 2020-03-15 RX ORDER — HYDROCORTISONE 20 MG
150 TABLET ORAL ONCE
Refills: 0 | Status: DISCONTINUED | OUTPATIENT
Start: 2020-03-15 | End: 2020-03-15

## 2020-03-15 RX ORDER — MAGNESIUM SULFATE 500 MG/ML
2 VIAL (ML) INJECTION ONCE
Refills: 0 | Status: COMPLETED | OUTPATIENT
Start: 2020-03-15 | End: 2020-03-15

## 2020-03-15 RX ORDER — INSULIN LISPRO 100/ML
VIAL (ML) SUBCUTANEOUS AT BEDTIME
Refills: 0 | Status: DISCONTINUED | OUTPATIENT
Start: 2020-03-15 | End: 2020-03-15

## 2020-03-15 RX ORDER — ALBUMIN HUMAN 25 %
100 VIAL (ML) INTRAVENOUS EVERY 8 HOURS
Refills: 0 | Status: DISCONTINUED | OUTPATIENT
Start: 2020-03-15 | End: 2020-03-15

## 2020-03-15 RX ORDER — DEXTROSE 50 % IN WATER 50 %
12.5 SYRINGE (ML) INTRAVENOUS ONCE
Refills: 0 | Status: DISCONTINUED | OUTPATIENT
Start: 2020-03-15 | End: 2020-03-15

## 2020-03-15 RX ORDER — INSULIN LISPRO 100/ML
12 VIAL (ML) SUBCUTANEOUS
Refills: 0 | Status: DISCONTINUED | OUTPATIENT
Start: 2020-03-15 | End: 2020-03-15

## 2020-03-15 RX ORDER — PANTOPRAZOLE SODIUM 20 MG/1
40 TABLET, DELAYED RELEASE ORAL
Refills: 0 | Status: DISCONTINUED | OUTPATIENT
Start: 2020-03-15 | End: 2020-03-15

## 2020-03-15 RX ORDER — OXYCODONE HYDROCHLORIDE 5 MG/1
10 TABLET ORAL ONCE
Refills: 0 | Status: DISCONTINUED | OUTPATIENT
Start: 2020-03-15 | End: 2020-03-15

## 2020-03-15 RX ORDER — OXYCODONE HYDROCHLORIDE 5 MG/1
10 TABLET ORAL EVERY 4 HOURS
Refills: 0 | Status: DISCONTINUED | OUTPATIENT
Start: 2020-03-15 | End: 2020-03-15

## 2020-03-15 RX ORDER — TAMSULOSIN HYDROCHLORIDE 0.4 MG/1
0.4 CAPSULE ORAL AT BEDTIME
Refills: 0 | Status: DISCONTINUED | OUTPATIENT
Start: 2020-03-15 | End: 2020-03-15

## 2020-03-15 RX ORDER — DEXTROSE 50 % IN WATER 50 %
15 SYRINGE (ML) INTRAVENOUS ONCE
Refills: 0 | Status: DISCONTINUED | OUTPATIENT
Start: 2020-03-15 | End: 2020-03-15

## 2020-03-15 RX ORDER — ACETAMINOPHEN 500 MG
1000 TABLET ORAL ONCE
Refills: 0 | Status: DISCONTINUED | OUTPATIENT
Start: 2020-03-15 | End: 2020-03-15

## 2020-03-15 RX ORDER — PROTHROMBIN COMPLEX CONCENTRATE (HUMAN) 25.5; 16.5; 24; 22; 22; 26 [IU]/ML; [IU]/ML; [IU]/ML; [IU]/ML; [IU]/ML; [IU]/ML
1500 POWDER, FOR SOLUTION INTRAVENOUS ONCE
Refills: 0 | Status: COMPLETED | OUTPATIENT
Start: 2020-03-15 | End: 2020-03-15

## 2020-03-15 RX ORDER — CALCIUM GLUCONATE 100 MG/ML
2 VIAL (ML) INTRAVENOUS ONCE
Refills: 0 | Status: COMPLETED | OUTPATIENT
Start: 2020-03-15 | End: 2020-03-15

## 2020-03-15 RX ORDER — LACTULOSE 10 G/15ML
20 SOLUTION ORAL THREE TIMES A DAY
Refills: 0 | Status: DISCONTINUED | OUTPATIENT
Start: 2020-03-15 | End: 2020-03-15

## 2020-03-15 RX ORDER — CHLORHEXIDINE GLUCONATE 213 G/1000ML
1 SOLUTION TOPICAL
Refills: 0 | Status: DISCONTINUED | OUTPATIENT
Start: 2020-03-16 | End: 2020-03-24

## 2020-03-15 RX ADMIN — Medication 200 GRAM(S): at 23:46

## 2020-03-15 RX ADMIN — LACTULOSE 20 GRAM(S): 10 SOLUTION ORAL at 14:07

## 2020-03-15 RX ADMIN — SODIUM CHLORIDE 100 MILLILITER(S): 9 INJECTION, SOLUTION INTRAVENOUS at 14:07

## 2020-03-15 RX ADMIN — MIDODRINE HYDROCHLORIDE 30 MILLIGRAM(S): 2.5 TABLET ORAL at 18:22

## 2020-03-15 RX ADMIN — SODIUM CHLORIDE 100 MILLILITER(S): 9 INJECTION, SOLUTION INTRAVENOUS at 08:43

## 2020-03-15 RX ADMIN — Medication 250 MILLIGRAM(S): at 23:46

## 2020-03-15 RX ADMIN — SODIUM CHLORIDE 100 MILLILITER(S): 9 INJECTION, SOLUTION INTRAVENOUS at 06:05

## 2020-03-15 RX ADMIN — LACTULOSE 20 GRAM(S): 10 SOLUTION ORAL at 06:23

## 2020-03-15 RX ADMIN — Medication 1 MILLIGRAM(S): at 14:07

## 2020-03-15 RX ADMIN — Medication 50 GRAM(S): at 23:43

## 2020-03-15 RX ADMIN — OXYCODONE HYDROCHLORIDE 10 MILLIGRAM(S): 5 TABLET ORAL at 16:25

## 2020-03-15 RX ADMIN — MEROPENEM 100 MILLIGRAM(S): 1 INJECTION INTRAVENOUS at 06:24

## 2020-03-15 RX ADMIN — Medication 2: at 23:54

## 2020-03-15 RX ADMIN — Medication 19.4 MICROGRAM(S)/KG/MIN: at 23:43

## 2020-03-15 RX ADMIN — MIDODRINE HYDROCHLORIDE 30 MILLIGRAM(S): 2.5 TABLET ORAL at 14:07

## 2020-03-15 RX ADMIN — Medication 50 MILLILITER(S): at 14:08

## 2020-03-15 RX ADMIN — MIDODRINE HYDROCHLORIDE 30 MILLIGRAM(S): 2.5 TABLET ORAL at 06:24

## 2020-03-15 RX ADMIN — MEROPENEM 100 MILLIGRAM(S): 1 INJECTION INTRAVENOUS at 23:43

## 2020-03-15 RX ADMIN — INSULIN GLARGINE 15 UNIT(S): 100 INJECTION, SOLUTION SUBCUTANEOUS at 23:53

## 2020-03-15 RX ADMIN — Medication 2: at 08:44

## 2020-03-15 RX ADMIN — PANTOPRAZOLE SODIUM 40 MILLIGRAM(S): 20 TABLET, DELAYED RELEASE ORAL at 06:24

## 2020-03-15 RX ADMIN — Medication 50 MILLILITER(S): at 06:23

## 2020-03-15 RX ADMIN — SODIUM CHLORIDE 125 MILLILITER(S): 9 INJECTION, SOLUTION INTRAVENOUS at 23:45

## 2020-03-15 RX ADMIN — OXYCODONE HYDROCHLORIDE 10 MILLIGRAM(S): 5 TABLET ORAL at 18:07

## 2020-03-15 NOTE — CONSULT NOTE ADULT - REASON FOR ADMISSION
Syncopal episode at home at 0430 this am, LOC for approx 3 minutes per wife, pt had episode of shaking during that time. Hx of multiple falls. Pt reporting abdominal pain for X 1day, no nausea or vomiting, endorses loose stool on lactulose at home
Syncope
syncope
Syncopal episode at home at 0430 this am, LOC for approx 3 minutes per wife, pt had episode of shaking during that time. Hx of multiple falls. Pt reporting abdominal pain for X 1day, no nausea or vomiting, endorses loose stool on lactulose at home

## 2020-03-15 NOTE — PROGRESS NOTE ADULT - ASSESSMENT
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

## 2020-03-15 NOTE — PROGRESS NOTE ADULT - SUBJECTIVE AND OBJECTIVE BOX
· Subjective and Objective:   [facility] - Sushila Dhaliwal, Morenita Camacho, Clay Rollins Savella, [place]  Office Number:  [phone]    Patient resting comfortably in bed in NAD.  Laying flat with no respiratory distress.  No complaints of chest pain, dyspnea, palpitations, PND, or orthopnea.    ROS: negative unless otherwise mentioned.    Telemetry:  off      PAST MEDICAL & SURGICAL HISTORY:  GIB (gastrointestinal bleeding)  GERD with esophagitis: Gastritis &amp; Non Bleeding Ulcers  Hepatic encephalopathy  Obesity  Fatty liver disease, nonalcoholic  Renal stones: 25 years ago  Hypertension  Neuropathy  Hypercholesteremia  Diabetes  S/P cholecystectomy      MEDICATIONS  (STANDING):      Allergies    codeine (Anaphylaxis)    Intolerances                              7.4    6.26  )-----------( 68       ( 15 Mar 2020 07:22 )             21.6       03-15    136  |  100  |  20  ----------------------------<  180<H>  4.5   |  26  |  0.97    Ca    9.8      15 Mar 2020 07:22    TPro  5.8<L>  /  Alb  3.4  /  TBili  7.1<H>  /  DBili  x   /  AST  18  /  ALT  13  /  AlkPhos  155<H>  03-15      LIVER FUNCTIONS - ( 15 Mar 2020 07:22 )  Alb: 3.4 g/dL / Pro: 5.8 g/dL / ALK PHOS: 155 U/L / ALT: 13 U/L / AST: 18 U/L / GGT: x             PT/INR - ( 14 Mar 2020 09:07 )   PT: 25.2 sec;   INR: 2.16 ratio         PTT - ( 14 Mar 2020 09:07 )  PTT:51.2 sec                      Daily     Daily     I&O's Summary    14 Mar 2020 07:01  -  15 Mar 2020 07:00  --------------------------------------------------------  IN: 1265 mL / OUT: 1500 mL / NET: -235 mL        Vital Signs Last 24 Hrs  T(C): 36.8 (15 Mar 2020 04:39), Max: 36.8 (14 Mar 2020 21:02)  T(F): 98.2 (15 Mar 2020 04:39), Max: 98.2 (14 Mar 2020 21:02)  HR: 76 (15 Mar 2020 04:39) (76 - 83)  BP: 101/60 (15 Mar 2020 04:39) (101/60 - 114/64)  BP(mean): --  RR: 18 (15 Mar 2020 04:39) (18 - 18)  SpO2: 97% (15 Mar 2020 04:39) (96% - 98%)    PHYSICAL EXAM:   · Constitutional	Well-developed, well nourished  · Eyes	EOMI; PERRL; no drainage or redness  · ENMT	No oral lesions; no gross abnormalities  · Neck	No bruits; no thyromegaly or nodules  · Respiratory	Normal breath sounds b/l, No RRW  · Cardiovascular	Regular rate & rhythm, normal S1, S2; no murmurs, gallops or rubs; no S3, S4  · Gastrointestinal	Soft, non-tender, no hepatosplenomegaly, normal bowel sounds  · Extremities	No cyanosis, clubbing or edema  · Vascular	Equal and normal pulses (carotid, femoral, dorsalis pedis)  · Neurological	Alert & oriented; no sensory, motor or coordination deficits, normal reflexes

## 2020-03-15 NOTE — BRIEF OPERATIVE NOTE - OPERATION/FINDINGS
Cystoscopy revealed drainage of some pus into the bladder from already formed track.  TUR of prostate into cavity revealed large cavity with purulent Cystoscopy revealed drainage of some pus into the bladder from already formed track.  TUR of prostate into cavity revealed large cavity with purulent material.    18Fr [place] tip bob placed over wire

## 2020-03-15 NOTE — CONSULT NOTE ADULT - ATTENDING COMMENTS
Dr. Mace (Attending Physician)  N - Lethargic oriented x 2, no sedation, minimal pain meds  P - Acute resp insufficiency  C - Hemorrhagic vs. Septic shock on NE received MTP large amount of olga blood in bob received 5 units RBC, 4 units FFP, 1 unit Plts  GI - NPO, Cirrhosis   - Lactate elevated to 13, MISA will start plasmalyte at 125, Potassium 6.1 hemolyzed  H - Hemorrhagic Shock - MTP activated, BP improved with 5 rbc, 4 ffp, 1 plts on hemorrhage watch  ID - Meropenem, received vancomcyin, send pct, blood cx sent likely septic shock from abscess drainage  E - DM decreased glargine to half home dose, q6 hour ssi, received 1 unit hydrocortisone Dr. Mace (Attending Physician)  N - Lethargic oriented x 2, no sedation, minimal pain meds  P - Acute resp insufficiency  C - Hemorrhagic vs. Septic shock on NE received MTP large amount of olga blood in bob received 5 units RBC, 4 units FFP, 1 unit Plts  GI - NPO, Cirrhosis   - Lactate elevated to 13, MISA will start plasmalyte at 125, Potassium 6.1 hemolyzed  H - Hemorrhagic Shock - MTP activated, BP improved with 5 rbc, 4 ffp, 1 plts on hemorrhage watch, if INR remains elevated and bleeding continues with give PCC  ID - Meropenem, received vancomcyin, send pct, blood cx sent likely septic shock from abscess drainage  E - DM decreased glargine to half home dose, q6 hour ssi, received 1 unit hydrocortisone

## 2020-03-15 NOTE — CHART NOTE - NSCHARTNOTEFT_GEN_A_CORE
Called to bedside by RN for olga blood in bob and BP 64/30.  BP repeated and same low.      RRT called and assumed care of the patient.   Pt was transferred to SICU.   HIC and Spouse Ada Segundo notified.     Ángela Loomis ANP-BC  [id number]

## 2020-03-15 NOTE — CHART NOTE - NSCHARTNOTEFT_GEN_A_CORE
65y/o male with urinary retention, GNR UTI, with CT findings of enlarged prostate, posterior component increased from prior (grossly normal 12/19). CT consistent with large prostate abscess.    Patient was taken to OR for transurethral resection/unroofing of prostate abscess (3/15).  Upon scoping bladder, noted to have some drainage of pus into bladder via fistulizing tract between prostatic abscess and cavity.  Using monopolar loop resectoscope, prostate was cut to open the cavity.  Immediate drainage of purulence noted.  After drainage of some pus, noted to be large cavity with previously seen connection to the bladder.  18Fr bob catheter placed over sensor wire into the bladder.      - Please do not manipulate bob catheter without contacting .    - Patient should f/u with Dr. Mayfield in the office for postoperative appointment and cystogram prior to possible TOV  - Continue Abx per ID recommendations  - Care per Medicine

## 2020-03-15 NOTE — PROGRESS NOTE ADULT - PROBLEM SELECTOR PLAN 1
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 Coli.  Will continue Meropenem per ID recs   s/p TURP and drainage of abscess --> needs pain control

## 2020-03-15 NOTE — PROGRESS NOTE ADULT - ASSESSMENT
64M with DM2,  dyslipidemia, obesity, BPH, Orthostatic hypotension, GERD, HTN, Neuropathy, Obesity, HFpEF with mild LV diastolic dysfunction, and decompensated JEAN cirrhosis complicated by ascites, history of SBP, hepatic encephalopathy, and chronic anemia with a history of duodenal ulcer as well as GAVE and duodenal AVM s/p APC (last on 10/11/19), who is UNOS listed for liver transplantation at [facility] who presents with fall at home, general weakness and ? syncope 2/2 sepsis from UTI suspected prostatitis.

## 2020-03-15 NOTE — PROCEDURE NOTE - NSPROCDETAILS_GEN_ALL_CORE
location identified, draped/prepped, sterile technique used, needle inserted/introduced/positive blood return obtained via catheter/sutured in place/ultrasound guidance/connected to a pressurized flush line/all materials/supplies accounted for at end of procedure/Seldinger technique

## 2020-03-15 NOTE — RAPID RESPONSE TEAM SUMMARY - NSSITUATIONBACKGROUNDRRT_GEN_ALL_CORE
64M with DM2,  dyslipidemia, obesity, BPH, Orthostatic hypotension, GERD, HTN, Neuropathy, Obesity, HFpEF with mild LV diastolic dysfunction, and decompensated JEAN cirrhosis complicated by ascites, history of SBP, hepatic encephalopathy, and chronic anemia with a history of duodenal ulcer as well as GAVE and duodenal AVM s/p APC (last on 10/11/19), who is UNOS listed for liver transplantation at [facility] who presents with fall at home, general weakness and ? syncope 2/2 sepsis from UTI suspected prostatitis. Pt was s/p transurethral resection of prostatic abscess POD#0; RRT called for hypotension 60s/30s and large amounts of olga blood in [place]. Pt was febrile to 101.3, BP 69/35 HR 98 O2SAT 97% RA RR 17. Pt was obtunded, jaundiced appearing, with large abdomen +fluid wave. Pt had received albumin at 14:00. 1L NS was bolused. Pt was also administered IV Tylenol and vanc/zosyn ordered for fevers. 1u PRBC was ordered. Labs including CBC, CMP, coags, CX were drawn. As team was unable to gain access, rt tibial IO was placed, and levophed was started for refractory BP (68/35). BP improved to 108/46. Urology and SICU consulted. Pt was accepted and transported to SICU. Family notified. 64M with DM2,  dyslipidemia, obesity, BPH, Orthostatic hypotension, GERD, HTN, Neuropathy, Obesity, HFpEF with mild LV diastolic dysfunction, and decompensated JEAN cirrhosis complicated by ascites, history of SBP, hepatic encephalopathy, and chronic anemia with a history of duodenal ulcer as well as GAVE and duodenal AVM s/p APC (last on 10/11/19), who is UNOS listed for liver transplantation at [facility] who presents with fall at home, general weakness and ? syncope 2/2 sepsis from UTI suspected prostatitis. Pt was s/p transurethral resection of prostatic abscess POD#0; RRT called for hypotension 60s/30s and large amounts of olga blood in [place]. Pt was febrile to 101.3, BP 69/35 HR 98 O2SAT 97% RA RR 17. Pt was obtunded, jaundiced appearing, with large abdomen +fluid wave. Pt had received albumin at 14:00. 1L NS was bolused. Pt was also administered IV Tylenol and vanc/zosyn ordered for fevers. 1u PRBC was ordered. Hydrocortisone 150 was ordered but not delivered. Labs including CBC, CMP, coags, CX were drawn. As team was unable to gain access, rt tibial IO was placed, and levophed was started for refractory BP (68/35). BP improved to 108/46. Urology and SICU consulted. Pt was accepted and transported to SICU. Family notified.

## 2020-03-15 NOTE — CONSULT NOTE ADULT - PROVIDER SPECIALTY LIST ADULT
Infectious Disease
SICU
Transplant Hepatology
Cardiology
Gastroenterology
Urology
Heme/Onc
Endocrinology

## 2020-03-15 NOTE — CONSULT NOTE ADULT - ASSESSMENT
ASSESSMENT:  JESSICA MARTIN is a 64y Male with pmh DM2, HLD, obesity, BPH, Orthostatic hypotension, GERD, HTN, Neuropathy, Obesity, HFpEF with mild LV diastolic dysfunction, and decompensated JEAN cirrhosis complicated by ascites, history of SBP, hepatic encephalopathy, and chronic anemia with a history of duodenal ulcer and duodenal AVM who is UNOS listed for liver transplantation at [facility] admitted with fall at home, general weakness/syncope s/p cystoscopy and transurethral drainage of prostatic abscess (3/15/20) c/b postop hypotension, altered mental status and bright red blood in bob bag.     PLAN:    NEURO:  - Monitor mental status - now improved, is responsive and arousable   - Avoiding narcotic and sedative medication      RESPIRATORY:   - Saturating well on NC    CARDIOVASCULAR:  - Monitor BP - maintain MAP>65  - Wean Levo as tolerated  - Trend lactate 11.5 and downtrending  - Tachycardic overnight   - Home lasix/midodrine on hold    GI/NUTRITION:  - NPO  - PPI    GENITOURINARY/RENAL:  - Follow up bob output quality and quantity, underwent bedside irrigation by urology  - [id number] @125   - Trend Cr    HEMATOLOGIC:  - s/p hemorrhage watch - H/H stable   - Q4 CBC  - DVT ppx on hold    INFECTIOUS DISEASE:  - Meropenem for broad spectrum and coverage of previous ESBL E. Coli in Urine (3/10/20)    ENDOCRINE:  - Monitor FS  - SSI  - Lantus 15u bedtime - (30 units full dose per Endo recommendations)    DISPO:  - SICU ASSESSMENT:  JESSICA MARTIN is a 64y Male with pmh DM2, HLD, obesity, BPH, Orthostatic hypotension, GERD, HTN, Neuropathy, Obesity, HFpEF with mild LV diastolic dysfunction, and decompensated JEAN cirrhosis complicated by ascites, history of SBP, hepatic encephalopathy, and chronic anemia with a history of duodenal ulcer and duodenal AVM who is UNOS listed for liver transplantation at [facility] admitted with fall at home, general weakness/syncope s/p cystoscopy and transurethral drainage of prostatic abscess (3/15/20) c/b postop hypotension, altered mental status and bright red blood in bob bag.     PLAN:    NEURO:  - Monitor mental status - now improved, is responsive and arousable   - Avoiding narcotic and sedative medication      RESPIRATORY:   - Saturating well on NC    CARDIOVASCULAR:  - Monitor BP - maintain MAP>65  - Wean Levo as tolerated  - Trend lactate 11.5 and downtrending  - Tachycardic overnight   - Home lasix/midodrine on hold    GI/NUTRITION:  - NPO  - PPI    GENITOURINARY/RENAL:  - Follow up bob output quality and quantity, underwent bedside irrigation by urology  - [id number] @125   - Trend Cr    HEMATOLOGIC:  - s/p hemorrhage watch - H/H stable   - Q4 CBC  - Trend INR - currently downtrending  - DVT ppx on hold    INFECTIOUS DISEASE:  - Meropenem for broad spectrum and coverage of previous ESBL E. Coli in Urine (3/10/20)    ENDOCRINE:  - Monitor FS  - SSI  - Lantus 15u bedtime - (30 units full dose per Endo recommendations)    DISPO:  - SICU

## 2020-03-15 NOTE — PROGRESS NOTE ADULT - ASSESSMENT
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 syncope in setting of UTI/abscess    - Syncope is most likely vagal in setting of urinary tract infection vs prostatic infection/abscess  - Hold diuretics as not vol ol  - Midodrine increased to 30mg q8 but unclear how much more efficacy he will get from this dose.   - I doubt this was arrhythmic but his prolonged QTC has been concerning. This was seen on his previous admission as well  - Avoid QTC prolonging drugs  - qtc ok as of 3/13  - Monitor and replete electrolytes. Keep K>4.0 and Mg>2.0. Replete Mg today  - cont abx    - No signs of significant ischemia. Cath with non obs disease  - echo in 2/2020 with normal LV function. No need to repeat.   - urology for TURP/abscess drainage today    - Further cardiac workup will depend on clinical course.   - All other workup per primary team. Will followup.

## 2020-03-15 NOTE — CONSULT NOTE ADULT - SUBJECTIVE AND OBJECTIVE BOX
HISTORY OF PRESENT ILLNESS:  JESSICA MARTIN is a 64y Male     PAST MEDICAL HISTORY: GIB (gastrointestinal bleeding)  GERD with esophagitis  Hepatic encephalopathy  Obesity  Fatty liver disease, nonalcoholic  Renal stones  Hypertension  Neuropathy  Hypercholesteremia  Diabetes      PAST SURGICAL HISTORY: S/P cholecystectomy  No significant past surgical history      FAMILY HISTORY: Family history of type 2 diabetes mellitus  Family history of hypertension  Family history of stomach cancer  No pertinent family history in first degree relatives      SOCIAL HISTORY:    CODE STATUS:     HOME MEDICATIONS:    ALLERGIES: codeine (Anaphylaxis)      VITAL SIGNS:  ICU Vital Signs Last 24 Hrs  T(C): 38.5 (15 Mar 2020 20:41), Max: 38.5 (15 Mar 2020 20:41)  T(F): 101.3 (15 Mar 2020 20:41), Max: 101.3 (15 Mar 2020 20:41)  HR: 133 (15 Mar 2020 22:15) (68 - 148)  BP: 123/57 (15 Mar 2020 22:15) (72/42 - 123/57)  BP(mean): 82 (15 Mar 2020 22:15) (65 - 84)  ABP: 125/50 (15 Mar 2020 22:15) (70/26 - 125/50)  ABP(mean): 81 (15 Mar 2020 22:15) (40 - 81)  RR: 21 (15 Mar 2020 22:15) (16 - 22)  SpO2: 100% (15 Mar 2020 22:15) (93% - 100%)      NEURO  Exam:  Meds:    RESPIRATORY  Mechanical Ventilation:   ABG - ( 15 Mar 2020 22:25 )  pH: 7.30  /  pCO2: 23    /  pO2: 140   / HCO3: 11    / Base Excess: -13.8 /  SaO2: 99      Lactate: x                Exam:  Meds:    CARDIOVASCULAR    Exam:  Cardiac Rhythm:  Meds:    GI/NUTRITION  Exam:  Diet:  Meds:lactulose Syrup 20 Gram(s) Oral three times a day      GENITOURINARY/RENAL  Meds:    03-14 @ 07:01  -  03-15 @ 07:00  --------------------------------------------------------  IN:    lactated ringers.: 100 mL    Oral Fluid: 1165 mL  Total IN: 1265 mL    OUT:    Indwelling Catheter - Urethral: 800 mL    Voided: 700 mL  Total OUT: 1500 mL    Total NET: -235 mL      03-15 @ 07:01  -  03-15 @ 22:34  --------------------------------------------------------  IN:  Total IN: 0 mL    OUT:    Indwelling Catheter - Urethral: 100 mL  Total OUT: 100 mL    Total NET: -100 mL        Weight (kg): 115.2 (03-15 @ 10:50)  03-15    136  |  98  |  24<H>  ----------------------------<  88  6.1<H>   |  11<L>  |  1.56<H>    Ca    10.2      15 Mar 2020 21:34  Phos  1.5     03-15  Mg     1.8     03-15    TPro  5.7<L>  /  Alb  3.3  /  TBili  8.4<H>  /  DBili  x   /  AST  75<H>  /  ALT  20  /  AlkPhos  102  03-15    [ ] Beasley catheter, indication: urine output monitoring in critically ill patient    HEMATOLOGIC  [ ] VTE Prophylaxis:                          6.3    31.57 )-----------( 63       ( 15 Mar 2020 21:34 )             20.0     PT/INR - ( 15 Mar 2020 09:32 )   PT: 26.8 sec;   INR: 2.27 ratio         PTT - ( 14 Mar 2020 09:07 )  PTT:51.2 sec  Transfusion: [ ] PRBC	[ ] Platelets	[ ] FFP	[ ] Cryoprecipitate      INFECTIOUS DISEASES  Meds:meropenem  IVPB 1000 milliGRAM(s) IV Intermittent every 8 hours  rifAXIMin 550 milliGRAM(s) Oral two times a day  vancomycin  IVPB 1000 milliGRAM(s) IV Intermittent once    RECENT CULTURES:      ENDOCRINE  Meds:hydrocortisone sodium succinate Injectable 150 milliGRAM(s) IV Push once  insulin glargine Injectable (LANTUS) 15 Unit(s) SubCutaneous at bedtime  insulin lispro (HumaLOG) corrective regimen sliding scale   SubCutaneous every 6 hours    CAPILLARY BLOOD GLUCOSE      POCT Blood Glucose.: 106 mg/dL (15 Mar 2020 20:50)  POCT Blood Glucose.: 122 mg/dL (15 Mar 2020 17:37)  POCT Blood Glucose.: 182 mg/dL (15 Mar 2020 08:32)      PATIENT CARE ACCESS DEVICES:  [ ] Peripheral IV  [ ] Central Venous Line	[ ] R	[ ] L	[ ] IJ	[ ] Fem	[ ] SC	Placed:   [ ] Arterial Line		[ ] R	[ ] L	[ ] Fem	[ ] Rad	[ ] Ax	Placed:   [ ] PICC:					[ ] Mediport  [ ] Urinary Catheter, Date Placed:   [x] Necessity of urinary, arterial, and venous catheters discussed    OTHER MEDICATIONS:     IMAGING STUDIES: HISTORY OF PRESENT ILLNESS:    64M with pmh DM2,  dyslipidemia, obesity, BPH, Orthostatic hypotension, GERD, HTN, Neuropathy, Obesity, HFpEF with mild LV diastolic dysfunction, and decompensated JEAN cirrhosis complicated by ascites, history of SBP, hepatic encephalopathy, and chronic anemia with a history of duodenal ulcer as well as GAVE and duodenal AVM s/p APC (last on 10/11/19), who is UNOS listed for liver transplantation at [facility] who presents with fall at home, general weakness and ? syncope.  Patient states that he was doing ok after dc from hospital and about Saturday 3/7, he started feeling generally weak and had diarrhea where he was moving his BM every 15 mns and his wife told him to hold the Lactulose which he did.  He was was having the constant urge to urinate .  However, yesterday , the Diarrhea stopped but he could not urinate.  Pt denies any nausea, vomiting, abd pain , no cough, no fever or chills or chest pain .  NO travel of sick contact.  NO visitors to his house ( of note, patient's youngest child in high school was told to stay at home for 14 days from school , not because of known exposure as per father .  It is because he did not go to school because he had some knee pain for one day as per father.    ED   :   Vitals are stable Pt received NS 500ml and one dose of Ertapenem due to abnormal urinalysis, blood and UCX were sent    Previous admissions :  2/28/20 - 3/03/20 for falls where SANTANA  was recommended but pt declined and was sent home .  Pt was on Lasix 40 mg daily alternating with 20mg which was changed to Lasix 20 mg oral daily and Midodrine increased from 20 mg oral THree times daily to 30 mg three times a day. (10 Mar 2020 15:41)    ID consulted for suspected UTI.   The patient reports that he has been having increased urination with pain, and horrible smelling urine. The urine has recently became a bit thick as well.       PAST MEDICAL HISTORY: GIB (gastrointestinal bleeding)  GERD with esophagitis  Hepatic encephalopathy  Obesity  Fatty liver disease, nonalcoholic  Renal stones  Hypertension  Neuropathy  Hypercholesteremia  Diabetes      PAST SURGICAL HISTORY: S/P cholecystectomy  No significant past surgical history      FAMILY HISTORY: Family history of type 2 diabetes mellitus  Family history of hypertension  Family history of stomach cancer  No pertinent family history in first degree relatives      SOCIAL HISTORY:    CODE STATUS:     HOME MEDICATIONS:    ALLERGIES: codeine (Anaphylaxis)      VITAL SIGNS:  ICU Vital Signs Last 24 Hrs  T(C): 38.5 (15 Mar 2020 20:41), Max: 38.5 (15 Mar 2020 20:41)  T(F): 101.3 (15 Mar 2020 20:41), Max: 101.3 (15 Mar 2020 20:41)  HR: 133 (15 Mar 2020 22:15) (68 - 148)  BP: 123/57 (15 Mar 2020 22:15) (72/42 - 123/57)  BP(mean): 82 (15 Mar 2020 22:15) (65 - 84)  ABP: 125/50 (15 Mar 2020 22:15) (70/26 - 125/50)  ABP(mean): 81 (15 Mar 2020 22:15) (40 - 81)  RR: 21 (15 Mar 2020 22:15) (16 - 22)  SpO2: 100% (15 Mar 2020 22:15) (93% - 100%)      NEURO  Exam:  Meds:    RESPIRATORY  Mechanical Ventilation:   ABG - ( 15 Mar 2020 22:25 )  pH: 7.30  /  pCO2: 23    /  pO2: 140   / HCO3: 11    / Base Excess: -13.8 /  SaO2: 99      Lactate: x                Exam:  Meds:    CARDIOVASCULAR    Exam:  Cardiac Rhythm:  Meds:    GI/NUTRITION  Exam:  Diet:  Meds:lactulose Syrup 20 Gram(s) Oral three times a day      GENITOURINARY/RENAL  Meds:    03-14 @ 07:01  -  03-15 @ 07:00  --------------------------------------------------------  IN:    lactated ringers.: 100 mL    Oral Fluid: 1165 mL  Total IN: 1265 mL    OUT:    Indwelling Catheter - Urethral: 800 mL    Voided: 700 mL  Total OUT: 1500 mL    Total NET: -235 mL      03-15 @ 07:01  -  03-15 @ 22:34  --------------------------------------------------------  IN:  Total IN: 0 mL    OUT:    Indwelling Catheter - Urethral: 100 mL  Total OUT: 100 mL    Total NET: -100 mL        Weight (kg): 115.2 (03-15 @ 10:50)  03-15    136  |  98  |  24<H>  ----------------------------<  88  6.1<H>   |  11<L>  |  1.56<H>    Ca    10.2      15 Mar 2020 21:34  Phos  1.5     03-15  Mg     1.8     03-15    TPro  5.7<L>  /  Alb  3.3  /  TBili  8.4<H>  /  DBili  x   /  AST  75<H>  /  ALT  20  /  AlkPhos  102  03-15    [ ] Beasley catheter, indication: urine output monitoring in critically ill patient    HEMATOLOGIC  [ ] VTE Prophylaxis:                          6.3    31.57 )-----------( 63       ( 15 Mar 2020 21:34 )             20.0     PT/INR - ( 15 Mar 2020 09:32 )   PT: 26.8 sec;   INR: 2.27 ratio         PTT - ( 14 Mar 2020 09:07 )  PTT:51.2 sec  Transfusion: [ ] PRBC	[ ] Platelets	[ ] FFP	[ ] Cryoprecipitate      INFECTIOUS DISEASES  Meds:meropenem  IVPB 1000 milliGRAM(s) IV Intermittent every 8 hours  rifAXIMin 550 milliGRAM(s) Oral two times a day  vancomycin  IVPB 1000 milliGRAM(s) IV Intermittent once    RECENT CULTURES:      ENDOCRINE  Meds:hydrocortisone sodium succinate Injectable 150 milliGRAM(s) IV Push once  insulin glargine Injectable (LANTUS) 15 Unit(s) SubCutaneous at bedtime  insulin lispro (HumaLOG) corrective regimen sliding scale   SubCutaneous every 6 hours    CAPILLARY BLOOD GLUCOSE      POCT Blood Glucose.: 106 mg/dL (15 Mar 2020 20:50)  POCT Blood Glucose.: 122 mg/dL (15 Mar 2020 17:37)  POCT Blood Glucose.: 182 mg/dL (15 Mar 2020 08:32)      PATIENT CARE ACCESS DEVICES:  [ ] Peripheral IV  [ ] Central Venous Line	[ ] R	[ ] L	[ ] IJ	[ ] Fem	[ ] SC	Placed:   [ ] Arterial Line		[ ] R	[ ] L	[ ] Fem	[ ] Rad	[ ] Ax	Placed:   [ ] PICC:					[ ] Mediport  [ ] Urinary Catheter, Date Placed:   [x] Necessity of urinary, arterial, and venous catheters discussed    OTHER MEDICATIONS:     IMAGING STUDIES: HISTORY OF PRESENT ILLNESS:    64M with pmh DM2, HLD, obesity, BPH, Orthostatic hypotension, GERD, HTN, Neuropathy, Obesity, HFpEF with mild LV diastolic dysfunction, and decompensated JEAN cirrhosis complicated by ascites, history of SBP, hepatic encephalopathy, and chronic anemia with a history of duodenal ulcer and duodenal AVM who is UNOS listed for liver transplantation at [facility] admitted with fall at home, general weakness/syncope. Collateral information obtained from chart review as patient is not communicative during evaluation. During admission patient had CT scan which showed diffuse low density appearance of the prostate concerning for abscess while patient reporting difficulty with urination but denying dysuria, urgency, frequency, gross hematuria, or rectal pain/pressure. Patient was take to OR with urology on 3/15/20 and underwent cystoscopy and transurethral drainage of prostatic abscess. Postop patient developed hypotension, altered mental status and bright red blood in bob bag. SICU consulted for evaluation. Vasopressor support was initiated with Levo and Vaso and MTP was activated.      PAST MEDICAL HISTORY: GIB (gastrointestinal bleeding)  GERD with esophagitis  Hepatic encephalopathy  Obesity  Fatty liver disease, nonalcoholic  Renal stones  Hypertension  Neuropathy  Hypercholesteremia  Diabetes      PAST SURGICAL HISTORY: S/P cholecystectomy  No significant past surgical history      FAMILY HISTORY: Family history of type 2 diabetes mellitus  Family history of hypertension  Family history of stomach cancer  No pertinent family history in first degree relatives      SOCIAL HISTORY:    CODE STATUS:     HOME MEDICATIONS:    ALLERGIES: codeine (Anaphylaxis)      VITAL SIGNS:  ICU Vital Signs Last 24 Hrs  T(C): 38.5 (15 Mar 2020 20:41), Max: 38.5 (15 Mar 2020 20:41)  T(F): 101.3 (15 Mar 2020 20:41), Max: 101.3 (15 Mar 2020 20:41)  HR: 133 (15 Mar 2020 22:15) (68 - 148)  BP: 123/57 (15 Mar 2020 22:15) (72/42 - 123/57)  BP(mean): 82 (15 Mar 2020 22:15) (65 - 84)  ABP: 125/50 (15 Mar 2020 22:15) (70/26 - 125/50)  ABP(mean): 81 (15 Mar 2020 22:15) (40 - 81)  RR: 21 (15 Mar 2020 22:15) (16 - 22)  SpO2: 100% (15 Mar 2020 22:15) (93% - 100%)      NEURO  Exam: Altered, no verbal response, unable to follow commands  Meds:    RESPIRATORY    ABG - ( 15 Mar 2020 22:25 )  pH: 7.30  /  pCO2: 23    /  pO2: 140   / HCO3: 11    / Base Excess: -13.8 /  SaO2: 99      Lactate: x                Exam: Bilateral breath sounds, no wheezes/rales, equal chest rise bilaterally  Meds:    CARDIOVASCULAR    Exam: NSR, RRR, hypotensive  Cardiac Rhythm:  Meds:    GI/NUTRITION  Exam:  Diet:  Meds:lactulose Syrup 20 Gram(s) Oral three times a day      GENITOURINARY/RENAL  Meds:    03-14 @ 07:01  -  03-15 @ 07:00  --------------------------------------------------------  IN:    lactated ringers.: 100 mL    Oral Fluid: 1165 mL  Total IN: 1265 mL    OUT:    Indwelling Catheter - Urethral: 800 mL    Voided: 700 mL  Total OUT: 1500 mL    Total NET: -235 mL      03-15 @ 07:01  -  03-15 @ 22:34  --------------------------------------------------------  IN:  Total IN: 0 mL    OUT:    Indwelling Catheter - Urethral: 100 mL  Total OUT: 100 mL    Total NET: -100 mL        Weight (kg): 115.2 (03-15 @ 10:50)  03-15    136  |  98  |  24<H>  ----------------------------<  88  6.1<H>   |  11<L>  |  1.56<H>    Ca    10.2      15 Mar 2020 21:34  Phos  1.5     03-15  Mg     1.8     03-15    TPro  5.7<L>  /  Alb  3.3  /  TBili  8.4<H>  /  DBili  x   /  AST  75<H>  /  ALT  20  /  AlkPhos  102  03-15    [ ] Bob catheter, indication: urine output monitoring in critically ill patient    HEMATOLOGIC  [ ] VTE Prophylaxis:                          6.3    31.57 )-----------( 63       ( 15 Mar 2020 21:34 )             20.0     PT/INR - ( 15 Mar 2020 09:32 )   PT: 26.8 sec;   INR: 2.27 ratio         PTT - ( 14 Mar 2020 09:07 )  PTT:51.2 sec  Transfusion: [ ] PRBC	[ ] Platelets	[ ] FFP	[ ] Cryoprecipitate      INFECTIOUS DISEASES  Meds:meropenem  IVPB 1000 milliGRAM(s) IV Intermittent every 8 hours  rifAXIMin 550 milliGRAM(s) Oral two times a day  vancomycin  IVPB 1000 milliGRAM(s) IV Intermittent once    RECENT CULTURES:      ENDOCRINE  Meds:hydrocortisone sodium succinate Injectable 150 milliGRAM(s) IV Push once  insulin glargine Injectable (LANTUS) 15 Unit(s) SubCutaneous at bedtime  insulin lispro (HumaLOG) corrective regimen sliding scale   SubCutaneous every 6 hours    CAPILLARY BLOOD GLUCOSE      POCT Blood Glucose.: 106 mg/dL (15 Mar 2020 20:50)  POCT Blood Glucose.: 122 mg/dL (15 Mar 2020 17:37)  POCT Blood Glucose.: 182 mg/dL (15 Mar 2020 08:32)      PATIENT CARE ACCESS DEVICES:  [ ] Peripheral IV  [ ] Central Venous Line	[ ] R	[ ] L	[ ] IJ	[ ] Fem	[ ] SC	Placed:   [ ] Arterial Line		[ ] R	[ ] L	[ ] Fem	[ ] Rad	[ ] Ax	Placed:   [ ] PICC:					[ ] Mediport  [ ] Urinary Catheter, Date Placed:   [x] Necessity of urinary, arterial, and venous catheters discussed    OTHER MEDICATIONS:     IMAGING STUDIES: HISTORY OF PRESENT ILLNESS:    64M with pmh DM2, HLD, obesity, BPH, Orthostatic hypotension, GERD, HTN, Neuropathy, Obesity, HFpEF with mild LV diastolic dysfunction, and decompensated JEAN cirrhosis complicated by ascites, history of SBP, hepatic encephalopathy, and chronic anemia with a history of duodenal ulcer and duodenal AVM who is UNOS listed for liver transplantation at [facility] admitted with fall at home, general weakness/syncope. Collateral information obtained from chart review as patient is not communicative during evaluation. During admission patient had CT scan which showed diffuse low density appearance of the prostate concerning for abscess while patient reporting difficulty with urination but denying dysuria, urgency, frequency, gross hematuria, or rectal pain/pressure. Patient was take to OR with urology on 3/15/20 and underwent cystoscopy and transurethral drainage of prostatic abscess. Postop patient developed hypotension, altered mental status and bright red blood in bob bag. SICU consulted for evaluation. Vasopressor support was initiated with Levo and Vaso and MTP was activated, 4 units of FFP and 4units of PRBC were transfused, also received 1unit platelets and vitamin K.      PAST MEDICAL HISTORY: GIB (gastrointestinal bleeding)  GERD with esophagitis  Hepatic encephalopathy  Obesity  Fatty liver disease, nonalcoholic  Renal stones  Hypertension  Neuropathy  Hypercholesteremia  Diabetes      PAST SURGICAL HISTORY: S/P cholecystectomy  No significant past surgical history      FAMILY HISTORY: Family history of type 2 diabetes mellitus  Family history of hypertension  Family history of stomach cancer  No pertinent family history in first degree relatives      SOCIAL HISTORY:    CODE STATUS:     HOME MEDICATIONS:    ALLERGIES: codeine (Anaphylaxis)      VITAL SIGNS:  ICU Vital Signs Last 24 Hrs  T(C): 38.5 (15 Mar 2020 20:41), Max: 38.5 (15 Mar 2020 20:41)  T(F): 101.3 (15 Mar 2020 20:41), Max: 101.3 (15 Mar 2020 20:41)  HR: 133 (15 Mar 2020 22:15) (68 - 148)  BP: 123/57 (15 Mar 2020 22:15) (72/42 - 123/57)  BP(mean): 82 (15 Mar 2020 22:15) (65 - 84)  ABP: 125/50 (15 Mar 2020 22:15) (70/26 - 125/50)  ABP(mean): 81 (15 Mar 2020 22:15) (40 - 81)  RR: 21 (15 Mar 2020 22:15) (16 - 22)  SpO2: 100% (15 Mar 2020 22:15) (93% - 100%)      NEURO  Exam: Altered, no verbal response, unable to follow commands  Meds:    RESPIRATORY    ABG - ( 15 Mar 2020 22:25 )  pH: 7.30  /  pCO2: 23    /  pO2: 140   / HCO3: 11    / Base Excess: -13.8 /  SaO2: 99      Lactate: x                Exam: Bilateral breath sounds, no wheezes/rales, equal chest rise bilaterally  Meds:    CARDIOVASCULAR    Exam: NSR, RRR, hypotensive  Cardiac Rhythm:  Meds:    GI/NUTRITION  Exam: soft, ND, nontender  Diet: NPO  Meds:lactulose Syrup 20 Gram(s) Oral three times a day      GENITOURINARY/RENAL  Meds:    03-14 @ 07:01  -  03-15 @ 07:00  --------------------------------------------------------  IN:    lactated ringers.: 100 mL    Oral Fluid: 1165 mL  Total IN: 1265 mL    OUT:    Indwelling Catheter - Urethral: 800 mL    Voided: 700 mL  Total OUT: 1500 mL    Total NET: -235 mL      03-15 @ 07:01  -  03-15 @ 22:34  --------------------------------------------------------  IN:  Total IN: 0 mL    OUT:    Indwelling Catheter - Urethral: 100 mL  Total OUT: 100 mL    Total NET: -100 mL        Weight (kg): 115.2 (03-15 @ 10:50)  03-15    136  |  98  |  24<H>  ----------------------------<  88  6.1<H>   |  11<L>  |  1.56<H>    Ca    10.2      15 Mar 2020 21:34  Phos  1.5     03-15  Mg     1.8     03-15    TPro  5.7<L>  /  Alb  3.3  /  TBili  8.4<H>  /  DBili  x   /  AST  75<H>  /  ALT  20  /  AlkPhos  102  03-15    [x] Bob catheter, indication: urine output monitoring in critically ill patient    HEMATOLOGIC  [ ] VTE Prophylaxis: On hold                          6.3    31.57 )-----------( 63       ( 15 Mar 2020 21:34 )             20.0     PT/INR - ( 15 Mar 2020 09:32 )   PT: 26.8 sec;   INR: 2.27 ratio         PTT - ( 14 Mar 2020 09:07 )  PTT:51.2 sec  Transfusion: [ ] PRBC	[ ] Platelets	[ ] FFP	[ ] Cryoprecipitate      INFECTIOUS DISEASES  Meds:meropenem  IVPB 1000 milliGRAM(s) IV Intermittent every 8 hours  rifAXIMin 550 milliGRAM(s) Oral two times a day  vancomycin  IVPB 1000 milliGRAM(s) IV Intermittent once    RECENT CULTURES:      ENDOCRINE  Meds:hydrocortisone sodium succinate Injectable 150 milliGRAM(s) IV Push once  insulin glargine Injectable (LANTUS) 15 Unit(s) SubCutaneous at bedtime  insulin lispro (HumaLOG) corrective regimen sliding scale   SubCutaneous every 6 hours    CAPILLARY BLOOD GLUCOSE      POCT Blood Glucose.: 106 mg/dL (15 Mar 2020 20:50)  POCT Blood Glucose.: 122 mg/dL (15 Mar 2020 17:37)  POCT Blood Glucose.: 182 mg/dL (15 Mar 2020 08:32)      PATIENT CARE ACCESS DEVICES:  [ ] Peripheral IV  [X] Central Venous Line	[ ] R	[x] L	[ ] IJ	[x] Fem	[ ] SC	Placed:   [ ] Arterial Line		[ ] R	[x] L	[x] Fem	[ ] Rad	[ ] Ax	Placed:   [ ] PICC:					[ ] Mediport  [ ] Urinary Catheter, Date Placed:   [x] Necessity of urinary, arterial, and venous catheters discussed    OTHER MEDICATIONS:     IMAGING STUDIES:

## 2020-03-15 NOTE — CHART NOTE - NSCHARTNOTEFT_GEN_A_CORE
called by medicine NP to evaluate post op patient currently in rapid response with hypotension and gross hematuria  pt s/p transurethral resection of prostatic abscess POD#0  pt with 18F bob in place- urine very hematuric.  Pt transferred to SICU for further care.  bladder irrigated with NS- no clots present. urine pink after irrigation  recent labs shows leukocytosis 31.57 and Hct 20.0 (pre-op21.6)  hypotension likely secondary to sepsis  and less likely from acute blood loss anemia.  no need for return to OR at this point.  keep 18F bob in place, no upsizing or CBI at this point.   irrigate prn  full care as per SICU team    urology resident, GIANCARLO Dolan present at bedside.  case discussed with urology attending, YOJANA Mayfield MD

## 2020-03-15 NOTE — PROGRESS NOTE ADULT - SUBJECTIVE AND OBJECTIVE BOX
GASTROENTEROLOGY    Patient seen and examined at bedside. Awake, alert and responisve  No acute overnight events noted  NPO for OR today  He denies nausea/vomiting/abdominal pain  Afebrile    MEDICATIONS  (STANDING):  albumin human 25% IVPB 100 milliLiter(s) IV Intermittent every 8 hours  chlorhexidine 2% Cloths 1 Application(s) Topical daily  dextrose 5%. 1000 milliLiter(s) (50 mL/Hr) IV Continuous <Continuous>  dextrose 50% Injectable 12.5 Gram(s) IV Push once  dextrose 50% Injectable 25 Gram(s) IV Push once  folic acid 1 milliGRAM(s) Oral daily  insulin glargine Injectable (LANTUS) 15 Unit(s) SubCutaneous at bedtime  insulin lispro (HumaLOG) corrective regimen sliding scale   SubCutaneous at bedtime  insulin lispro (HumaLOG) corrective regimen sliding scale   SubCutaneous three times a day before meals  insulin lispro Injectable (HumaLOG) 12 Unit(s) SubCutaneous three times a day before meals  lactated ringers. 1000 milliLiter(s) (100 mL/Hr) IV Continuous <Continuous>  lactulose Syrup 20 Gram(s) Oral three times a day  meropenem  IVPB 1000 milliGRAM(s) IV Intermittent every 8 hours  midodrine. 30 milliGRAM(s) Oral three times a day  pantoprazole    Tablet 40 milliGRAM(s) Oral before breakfast  rifAXIMin 550 milliGRAM(s) Oral two times a day  tamsulosin 0.4 milliGRAM(s) Oral at bedtime        T(F): 98.2 (03-15-20 @ 04:39), Max: 98.2 (03-14-20 @ 21:02)  HR: 76 (03-15-20 @ 04:39) (76 - 83)  BP: 101/60 (03-15-20 @ 04:39) (101/60 - 114/64)  RR: 18 (03-15-20 @ 04:39) (18 - 18)  SpO2: 97% (03-15-20 @ 04:39) (96% - 98%)  Wt(kg): --    PHYSICAL EXAM  GENERAL:   NAD  HEENT:  NC/AT, no JVD, sclera-anicteric  CHEST:  clear to ascultation bilaterally, respirations nonlabored  HEART:  +S1+S2   ABDOMEN:  Soft, non-tender, non-distended, + bowel sounds  EXTREMITIES:  no cyanosis, clubbing or edema  NEURO:  Alert, oriented  SKIN:  No rash/warm/dry      LABS:    03-14    130<L>  |  96  |  20  ----------------------------<  264<H>  4.5   |  26  |  0.93    Ca    9.7      14 Mar 2020 06:49    TPro  5.9<L>  /  Alb  3.3  /  TBili  7.4<H>  /  DBili  x   /  AST  18  /  ALT  13  /  AlkPhos  183<H>  03-14    LIVER FUNCTIONS - ( 14 Mar 2020 06:49 )  Alb: 3.3 g/dL / Pro: 5.9 g/dL / ALK PHOS: 183 U/L / ALT: 13 U/L / AST: 18 U/L / GGT: x           PT/INR - ( 14 Mar 2020 09:07 )   PT: 25.2 sec;   INR: 2.16 ratio         PTT - ( 14 Mar 2020 09:07 )  PTT:51.2 sec  I&O's Detail    14 Mar 2020 07:01  -  15 Mar 2020 07:00  --------------------------------------------------------  IN:    lactated ringers.: 100 mL    Oral Fluid: 1165 mL  Total IN: 1265 mL    OUT:    Indwelling Catheter - Urethral: 800 mL    Voided: 700 mL  Total OUT: 1500 mL    Total NET: -235 mL

## 2020-03-15 NOTE — PROGRESS NOTE ADULT - PROBLEM SELECTOR PLAN 9
blood glucose is above goal  endo had recommeded to increase lantus to 30unit and increase humalog to 12unit given his persistent hyperglycemia while he was eating.  A1C = 6.4 on 2/2020   cont glucose monitoring   Glucose consistent diet   Insulin sliding scale

## 2020-03-15 NOTE — CHART NOTE - NSCHARTNOTEFT_GEN_A_CORE
See RRT sheet for detailed assessment and plan. I personally provided __ minutes of critical care for the patient.    Patient well known to me from admissions. I personally provided 45 minutes of critical care for the patient. Patient suffering from possible septic and/or hemorrhagic shock.     Patient well known to me from prior admissions.    64M with PMHx of JEAN (complicated by prior history of encephalopathy, possible SBP, currently on transplant list), GAVE, varices (esophageal?), and T2DM presents to [facility] with lightheadedness, syncope and weakness. Patient previously admitted for same issue at which time found to be orthostatic; thus, diuretics reduced. Patient represented on this admission for same issue. On this admission patient found to likely have abscess/fistula/prostatitis. He underwent transure      presents to [facility] after feeling increasingly weak for several days. Patient with multiple admissions for the past several months. Patient last here one week prior after being treated for ESBL bacteremia for which he finished complete 10 day course. On admission during that hospitalization patient required brief MICU stay for possible septic shock. Today patient presents because he feels his legs giving out. He was ambulating without a walker, but now requires one. States he feels weak even when sitting. He's not really able to tell me what time of day he feels weak and if there is any relation to his diuretic use. Patient denies lightheadedness, headache, visual disturbance, numbness of feet, fevers, or abdominal distention. In ED patient noted to be hypotensive at 79/44, but given Albumin which improved his BP to 117/56. HR has remained steady in the 90s. Patient seen by me on 6MONTI with no complaints. He's frustrated by medical condition.      MEDICATIONS  (STANDING):  acetaminophen  IVPB .. 1000 milliGRAM(s) IV Intermittent once  albumin human 25% IVPB 100 milliLiter(s) IV Intermittent every 8 hours  dextrose 5%. 1000 milliLiter(s) (50 mL/Hr) IV Continuous <Continuous>  dextrose 50% Injectable 12.5 Gram(s) IV Push once  folic acid 1 milliGRAM(s) Oral daily  hydrocortisone sodium succinate Injectable 150 milliGRAM(s) IV Push once  insulin glargine Injectable (LANTUS) 30 Unit(s) SubCutaneous at bedtime  insulin lispro (HumaLOG) corrective regimen sliding scale   SubCutaneous three times a day before meals  insulin lispro (HumaLOG) corrective regimen sliding scale   SubCutaneous at bedtime  insulin lispro Injectable (HumaLOG) 12 Unit(s) SubCutaneous three times a day before meals  lactated ringers. 1000 milliLiter(s) (100 mL/Hr) IV Continuous <Continuous>  lactulose Syrup 20 Gram(s) Oral three times a day  midodrine. 30 milliGRAM(s) Oral three times a day  pantoprazole    Tablet 40 milliGRAM(s) Oral before breakfast  piperacillin/tazobactam IVPB. 3.375 Gram(s) IV Intermittent once  piperacillin/tazobactam IVPB.. 3.375 Gram(s) IV Intermittent every 8 hours  rifAXIMin 550 milliGRAM(s) Oral two times a day  tamsulosin 0.4 milliGRAM(s) Oral at bedtime  vancomycin  IVPB 1000 milliGRAM(s) IV Intermittent once I personally provided 45 minutes of critical care for the patient. Patient suffering from possible septic and/or hemorrhagic shock.     Patient well known to me from prior admissions.    64M with PMHx of JEAN (complicated by prior history of encephalopathy, possible SBP, currently on transplant list), GAVE, varices (esophageal?), and T2DM presents to [facility] with lightheadedness, syncope and weakness. Patient previously admitted for same issue at which time found to be orthostatic; thus, diuretics reduced. Patient re-presented on this admission for same issue. On this admission patient found to likely have abscess/fistula/prostatitis. He underwent transurethral resection. Shortly, thereafter patient noted to be drain gross blood from [place] (approximately 0.5 liter?). Patient lethargic, but responsive to verbal stimuli. He was found to have BP ~60s/30s. Patient not tachycardic or tachypneic and found to be febrile.     RRT called for hemodynamic instability. Patient bolused 1 liter with pressure bag. Stat doses of vancomycin and Zosyn given. Patient started on peripheral Levophed. Patient with right arm PIV with second access gained with emergent IO placed in right tibia. After interventions systolics improved to 100s. Patient with noted hemoglobin of 7.4 this morning. Patient given emergent unit of pRBC. Patient transferred to SICU for further care.    Recommendations:  -Discontinue Zosyn and start empiric meropenem for prior history of ESBL  -Add vancomycin if hemodynamically unstable  -Panculture: BCx x2, CXR, urine culture  -Pressors per SICU  -Maintain FS between 140-180, consider reducing basal insulin  -Maintain euthermia with Tylenol  -Patient with JEAN Cirrhosis: follow up hepatology recommendations, pressure support as needed, continue with Lactulose (titrated to bowel movements) and Rifaxamin, midodrine. Once pressure improves consider re-introducing Lasix and Aldactone as allowable clinically  -GI PPx: Protonix (small varices)    Prognosis remains guarded given multiple comorbidities. Family informed of change in clinical status.    Spectra: [phone]  Pager: [phone] I personally provided 45 minutes of critical care for the patient. Patient suffering from possible septic and/or hemorrhagic shock.     Patient well known to me from prior admissions.    64M with PMHx of JEAN (complicated by prior history of encephalopathy, possible SBP, currently on transplant list), GAVE, varices (esophageal?), and T2DM presents to [facility] with lightheadedness, syncope and weakness. Patient previously admitted for same issue at which time found to be orthostatic; thus, diuretics reduced. Patient re-presented on this admission for same issue. On this admission patient found to likely have abscess/fistula/prostatitis. He underwent transurethral resection. Shortly, thereafter patient noted to be drain gross blood from [place] (approximately 0.5 liter?). Patient lethargic, but responsive to verbal stimuli. He was found to have BP ~60s/30s. Patient not tachycardic or tachypneic and found to be febrile.     RRT called for hemodynamic instability. Patient bolused 1 liter with pressure bag. Stat doses of vancomycin and Zosyn given. Patient started on peripheral Levophed. Patient with right arm PIV with second access gained with emergent IO placed in right tibia. After interventions systolics improved to 100s. Patient with noted hemoglobin of 7.4 this morning. Patient given emergent unit of pRBC. Patient transferred to SICU for further care.    Recommendations:  -Discontinue Zosyn and start empiric meropenem for prior history of ESBL  -Add vancomycin if hemodynamically unstable  -Panculture: BCx x2, CXR, urine culture  -Pressors per SICU  -Maintain FS between 140-180, consider reducing basal insulin  -Maintain euthermia with Tylenol  -Patient with JEAN Cirrhosis: follow up hepatology recommendations, pressure support as needed, continue with Lactulose (titrated to bowel movements) and Rifaxamin, midodrine. Once pressure improves consider re-introducing Lasix and Aldactone as allowable clinically  -GI PPx: Protonix (small varices)    Prognosis remains guarded given multiple comorbidities. Family informed of change in clinical status.    Spectra: [phone]  Pager: [phone] I personally provided 45 minutes of critical care for the patient. Patient suffering from possible septic and/or hemorrhagic shock.     Patient well known to me from prior admissions.    64M with PMHx of JEAN (complicated by prior history of encephalopathy, possible SBP, currently on transplant list), GAVE, varices (esophageal?), and T2DM presents to [facility] with lightheadedness, syncope and weakness. Patient previously admitted for same issue at which time found to be orthostatic; thus, diuretics reduced. Patient re-presented on this admission for same issue. On this admission patient found to likely have abscess/fistula/prostatitis. He underwent transurethral resection. Shortly, thereafter patient noted to be drain gross blood from [place] (approximately 0.5 liter?). Patient lethargic, but responsive to verbal stimuli. He was found to have BP ~60s/30s. Patient not tachycardic or tachypneic and found to be febrile.     RRT called for hemodynamic instability. Patient bolused 1 liter with pressure bag. Stat doses of vancomycin and Zosyn given. Patient started on peripheral Levophed. Patient with right arm PIV with second access gained with emergent IO placed in right tibia. After interventions systolics improved to 100s. Patient with noted hemoglobin of 7.4 this morning. Patient given emergent unit of pRBC. Patient transferred to SICU for further care.    Recommendations:  -Discontinue Zosyn and start empiric meropenem for prior history of ESBL  -Add vancomycin if hemodynamically unstable  -Panculture: BCx x2, CXR, urine culture  -Pressors per SICU  -Maintain FS between 140-180, consider reducing basal insulin  -Maintain euthermia, limit Tylenol use given Cirrhosis  -Patient with JEAN Cirrhosis: follow up hepatology recommendations, pressure support as needed, continue with Lactulose (titrated to bowel movements) and Rifaxamin, midodrine. Once pressure improves consider re-introducing Lasix and Aldactone as allowable clinically  -GI PPx: Protonix (small varices)    Prognosis remains guarded given multiple comorbidities. Family informed of change in clinical status.    Spectra: [phone]  Pager: [phone]

## 2020-03-15 NOTE — CHART NOTE - NSCHARTNOTEFT_GEN_A_CORE
Post-operative Check    SUBJECTIVE: No acute events in the immediate post-operative period. Pain well controlled, patient resting comfortably.    Bob with hematuria, punch w mixed yellow urine in tubing.      OBJECTIVE:  T(C): 36.2 (03-15-20 @ 12:45), Max: 36.8 (03-14-20 @ 21:02)  HR: 73 (03-15-20 @ 12:45) (68 - 83)  BP: 117/63 (03-15-20 @ 12:45) (101/60 - 118/55)  RR: 16 (03-15-20 @ 12:45) (16 - 18)  SpO2: 100% (03-15-20 @ 12:45) (96% - 100%)      03-14-20 @ 07:01  -  03-15-20 @ 07:00  --------------------------------------------------------  IN: 1265 mL / OUT: 1500 mL / NET: -235 mL    03-15-20 @ 07:01  -  03-15-20 @ 16:26  --------------------------------------------------------  IN: 0 mL / OUT: 100 mL / NET: -100 mL        Physical Exam:   - Constitutional: AOx3, NAD  - Respiratory: nonlabored  - Abdomen: soft, nontender, nondistended  - : punch colored urine in catheter    ASSESSMENT:   JESSICA MARTIN is a 64y Male POD#0 from TUR of prostatic abscess.     PLAN:  - Please do not manipulate bob catheter without contacting .  OK to flush prn.  - Patient should f/u with Dr. Mayfield in the office for postoperative appointment and cystogram prior to possible TOV  - Continue Abx per ID recommendations  - AM CBC/BMP  - Care per Medicine. Post-operative Check    SUBJECTIVE: No acute events in the immediate post-operative period.   Endorses suprapubic pain.  Bob draining with hematuria, no clots -  punch w mixed yellow urine in tubing.      OBJECTIVE:  T(C): 36.2 (03-15-20 @ 12:45), Max: 36.8 (03-14-20 @ 21:02)  HR: 73 (03-15-20 @ 12:45) (68 - 83)  BP: 117/63 (03-15-20 @ 12:45) (101/60 - 118/55)  RR: 16 (03-15-20 @ 12:45) (16 - 18)  SpO2: 100% (03-15-20 @ 12:45) (96% - 100%)      03-14-20 @ 07:01  -  03-15-20 @ 07:00  --------------------------------------------------------  IN: 1265 mL / OUT: 1500 mL / NET: -235 mL    03-15-20 @ 07:01  -  03-15-20 @ 16:26  --------------------------------------------------------  IN: 0 mL / OUT: 100 mL / NET: -100 mL        Physical Exam:   - Constitutional: AOx3, NAD  - Respiratory: nonlabored  - Abdomen: soft, nontender, nondistended  - : punch colored urine in catheter    ASSESSMENT:   JESSICA MARTIN is a 64y Male POD#0 from TUR of prostatic abscess.     PLAN:  - Please do not manipulate bob catheter without contacting .  OK to flush prn.  - Patient should f/u with Dr. Mayfield in the office for postoperative appointment and cystogram prior to possible TOV  - Continue Abx per ID recommendations  - AM CBC/BMP  - Care per Medicine. Post-operative Check    SUBJECTIVE: No acute events in the immediate post-operative period.   Endorses suprapubic pain.  Bob draining with hematuria, no clots -  punch w mixed yellow urine in tubing.      OBJECTIVE:  T(C): 36.2 (03-15-20 @ 12:45), Max: 36.8 (03-14-20 @ 21:02)  HR: 73 (03-15-20 @ 12:45) (68 - 83)  BP: 117/63 (03-15-20 @ 12:45) (101/60 - 118/55)  RR: 16 (03-15-20 @ 12:45) (16 - 18)  SpO2: 100% (03-15-20 @ 12:45) (96% - 100%)      03-14-20 @ 07:01  -  03-15-20 @ 07:00  --------------------------------------------------------  IN: 1265 mL / OUT: 1500 mL / NET: -235 mL    03-15-20 @ 07:01  -  03-15-20 @ 16:26  --------------------------------------------------------  IN: 0 mL / OUT: 100 mL / NET: -100 mL        Physical Exam:   - Constitutional: AOx3, NAD  - Respiratory: nonlabored  - Abdomen: soft, nontender, nondistended  - : punch colored urine in catheter    ASSESSMENT:   JESSICA MARTIN is a 64y Male POD#0 from TUR of prostatic abscess.     PLAN:  - Please do not manipulate bob catheter without contacting .  OK to flush prn.  - Patient should f/u with Dr. Mayfield in the office for postoperative appointment and cystogram prior to possible TOV  - Continue Abx per ID recommendations  - F/U OR Cx  - AM CBC/BMP  - Care per Medicine.

## 2020-03-15 NOTE — PROCEDURE NOTE - NSINDICATIONS_GEN_A_CORE
arterial puncture to obtain ABG's/monitoring purposes/critical patient
venous access/critical illness/emergency venous access/volume resuscitation
Complex Repair And Melolabial Flap Text: The defect edges were debeveled with a #15 scalpel blade.  The primary defect was closed partially with a complex linear closure.  Given the location of the remaining defect, shape of the defect and the proximity to free margins a melolabial flap was deemed most appropriate for complete closure of the defect.  Using a sterile surgical marker, an appropriate advancement flap was drawn incorporating the defect and placing the expected incisions within the relaxed skin tension lines where possible.    The area thus outlined was incised deep to adipose tissue with a #15 scalpel blade.  The skin margins were undermined to an appropriate distance in all directions utilizing iris scissors.

## 2020-03-15 NOTE — PROGRESS NOTE ADULT - SUBJECTIVE AND OBJECTIVE BOX
[facility] Division of Hospital Medicine  Andre J. Reyes  Pager:[phone]    Patient is a 64y old  Male who presents with a chief complaint of Syncopal episode at home at 0430 this am, LOC for approx 3 minutes per wife, pt had episode of shaking during that time. Hx of multiple falls. Pt reporting abdominal pain for X 1day, no nausea or vomiting, endorses loose stool on lactulose at home (15 Mar 2020 10:42)      SUBJECTIVE / OVERNIGHT EVENTS:    pt seen post-op  pt rigoring 2/2 pain. 10/10 suprapubic pain     ROS: ( - ) Fever, ( - )Chills,  ( - )Nausea/Vomiting, ( - ) Cough, ( - )Shortness of breath, ( - )Chest Pain      ADDITIONAL REVIEW OF SYSTEMS:    MEDICATIONS  (STANDING):  albumin human 25% IVPB 100 milliLiter(s) IV Intermittent every 8 hours  dextrose 5%. 1000 milliLiter(s) (50 mL/Hr) IV Continuous <Continuous>  dextrose 50% Injectable 12.5 Gram(s) IV Push once  folic acid 1 milliGRAM(s) Oral daily  insulin glargine Injectable (LANTUS) 15 Unit(s) SubCutaneous at bedtime  insulin lispro (HumaLOG) corrective regimen sliding scale   SubCutaneous at bedtime  insulin lispro (HumaLOG) corrective regimen sliding scale   SubCutaneous three times a day before meals  insulin lispro Injectable (HumaLOG) 12 Unit(s) SubCutaneous three times a day before meals  lactated ringers. 1000 milliLiter(s) (100 mL/Hr) IV Continuous <Continuous>  lactulose Syrup 20 Gram(s) Oral three times a day  midodrine. 30 milliGRAM(s) Oral three times a day  pantoprazole    Tablet 40 milliGRAM(s) Oral before breakfast  rifAXIMin 550 milliGRAM(s) Oral two times a day  tamsulosin 0.4 milliGRAM(s) Oral at bedtime    MEDICATIONS  (PRN):  glucagon  Injectable 1 milliGRAM(s) IntraMuscular once PRN Glucose LESS THAN 70 milligrams/deciliter  ondansetron Injectable 4 milliGRAM(s) IV Push every 6 hours PRN Nausea  oxyCODONE    IR 5 milliGRAM(s) Oral every 4 hours PRN Moderate Pain (4 - 6)  oxyCODONE    IR 10 milliGRAM(s) Oral every 4 hours PRN Severe Pain (7 - 10)      T(C): 36.2 (03-15 @ 12:45), Max: 36.8 (03-14 @ 21:02)   HR: 73   BP: 117/63   RR: 16   SpO2: 100%    PHYSICAL EXAM:  PHYSICAL EXAM:    CONSTITUTIONAL: NAD, well-developed, obese, ill appearing, jaundice, scleral icterus  RESPIRATORY: Normal respiratory effort; lungs are clear to auscultation bilaterally  CARDIOVASCULAR: Regular rate and rhythm, normal S1 and S2, no murmur/rub/gallop; No lower extremity edema; Peripheral pulses are 2+ bilaterally  ABDOMEN: + suprapubic tenderness palpation, normoactive bowel sounds, voluntary guarding; No hepatosplenomegaly no asterxis  : bright red blood in bob   MUSCLOSKELETAL: no clubbing or cyanosis of digits; no joint swelling or tenderness to palpation  PSYCH: A+O to person, place, and time; affect appropriate    I&O's Summary    14 Mar 2020 07:01  -  15 Mar 2020 07:00  --------------------------------------------------------  IN: 1265 mL / OUT: 1500 mL / NET: -235 mL    15 Mar 2020 07:01  -  15 Mar 2020 17:08  --------------------------------------------------------  IN: 0 mL / OUT: 100 mL / NET: -100 mL        LABS:                        7.4    6.26  )-----------( 68       ( 15 Mar 2020 07:22 )             21.6     03-15    136  |  100  |  20  ----------------------------<  180<H>  4.5   |  26  |  0.97    Ca    9.8      15 Mar 2020 07:22    TPro  5.8<L>  /  Alb  3.4  /  TBili  7.1<H>  /  DBili  x   /  AST  18  /  ALT  13  /  AlkPhos  155<H>  03-15    PT/INR - ( 15 Mar 2020 09:32 )   PT: 26.8 sec;   INR: 2.27 ratio         PTT - ( 14 Mar 2020 09:07 )  PTT:51.2 sec          CAPILLARY BLOOD GLUCOSE      POCT Blood Glucose.: 182 mg/dL (15 Mar 2020 08:32)  POCT Blood Glucose.: 274 mg/dL (14 Mar 2020 21:41)  POCT Blood Glucose.: 247 mg/dL (14 Mar 2020 17:25)      RADIOLOGY & ADDITIONAL TESTS:  Results Reviewed:   Imaging Personally Reviewed:  Electrocardiogram Personally Reviewed:    COORDINATION OF CARE:  Care Discussed with Consultants/Other Providers [Y/N]:  Prior or Outpatient Records Reviewed [Y/N]:

## 2020-03-16 DIAGNOSIS — E11.10 TYPE 2 DIABETES MELLITUS WITH KETOACIDOSIS WITHOUT COMA: ICD-10-CM

## 2020-03-16 LAB
ACTH SER-ACNC: 16 PG/ML — SIGNIFICANT CHANGE UP (ref 7.2–63.3)
ANION GAP SERPL CALC-SCNC: 14 MMOL/L — SIGNIFICANT CHANGE UP (ref 5–17)
ANION GAP SERPL CALC-SCNC: 14 MMOL/L — SIGNIFICANT CHANGE UP (ref 5–17)
ANION GAP SERPL CALC-SCNC: 20 MMOL/L — HIGH (ref 5–17)
ANION GAP SERPL CALC-SCNC: 24 MMOL/L — HIGH (ref 5–17)
ANION GAP SERPL CALC-SCNC: 25 MMOL/L — HIGH (ref 5–17)
APTT BLD: 43.3 SEC — HIGH (ref 27.5–36.3)
APTT BLD: 47.4 SEC — HIGH (ref 27.5–36.3)
APTT BLD: 48.1 SEC — HIGH (ref 27.5–36.3)
APTT BLD: 53.4 SEC — HIGH (ref 27.5–36.3)
APTT BLD: 57.8 SEC — HIGH (ref 27.5–36.3)
BUN SERPL-MCNC: 26 MG/DL — HIGH (ref 7–23)
BUN SERPL-MCNC: 27 MG/DL — HIGH (ref 7–23)
BUN SERPL-MCNC: 28 MG/DL — HIGH (ref 7–23)
CALCIUM SERPL-MCNC: 10 MG/DL — SIGNIFICANT CHANGE UP (ref 8.4–10.5)
CALCIUM SERPL-MCNC: 10.2 MG/DL — SIGNIFICANT CHANGE UP (ref 8.4–10.5)
CALCIUM SERPL-MCNC: 10.4 MG/DL — SIGNIFICANT CHANGE UP (ref 8.4–10.5)
CALCIUM SERPL-MCNC: 10.8 MG/DL — HIGH (ref 8.4–10.5)
CALCIUM SERPL-MCNC: 9.6 MG/DL — SIGNIFICANT CHANGE UP (ref 8.4–10.5)
CHLORIDE SERPL-SCNC: 96 MMOL/L — SIGNIFICANT CHANGE UP (ref 96–108)
CHLORIDE SERPL-SCNC: 96 MMOL/L — SIGNIFICANT CHANGE UP (ref 96–108)
CHLORIDE SERPL-SCNC: 97 MMOL/L — SIGNIFICANT CHANGE UP (ref 96–108)
CHLORIDE SERPL-SCNC: 97 MMOL/L — SIGNIFICANT CHANGE UP (ref 96–108)
CHLORIDE SERPL-SCNC: 98 MMOL/L — SIGNIFICANT CHANGE UP (ref 96–108)
CO2 SERPL-SCNC: 13 MMOL/L — LOW (ref 22–31)
CO2 SERPL-SCNC: 14 MMOL/L — LOW (ref 22–31)
CO2 SERPL-SCNC: 18 MMOL/L — LOW (ref 22–31)
CO2 SERPL-SCNC: 24 MMOL/L — SIGNIFICANT CHANGE UP (ref 22–31)
CO2 SERPL-SCNC: 25 MMOL/L — SIGNIFICANT CHANGE UP (ref 22–31)
CREAT SERPL-MCNC: 0.94 MG/DL — SIGNIFICANT CHANGE UP (ref 0.5–1.3)
CREAT SERPL-MCNC: 1.13 MG/DL — SIGNIFICANT CHANGE UP (ref 0.5–1.3)
CREAT SERPL-MCNC: 1.31 MG/DL — HIGH (ref 0.5–1.3)
CREAT SERPL-MCNC: 1.42 MG/DL — HIGH (ref 0.5–1.3)
CREAT SERPL-MCNC: 1.53 MG/DL — HIGH (ref 0.5–1.3)
FIBRINOGEN PPP-MCNC: 162 MG/DL — LOW (ref 350–510)
FIBRINOGEN PPP-MCNC: 166 MG/DL — LOW (ref 350–510)
FIBRINOGEN PPP-MCNC: 167 MG/DL — LOW (ref 350–510)
FIBRINOGEN PPP-MCNC: 167 MG/DL — LOW (ref 350–510)
FIBRINOGEN PPP-MCNC: 169 MG/DL — LOW (ref 350–510)
GAS PNL BLDA: SIGNIFICANT CHANGE UP
GLUCOSE BLDC GLUCOMTR-MCNC: 102 MG/DL — HIGH (ref 70–99)
GLUCOSE BLDC GLUCOMTR-MCNC: 112 MG/DL — HIGH (ref 70–99)
GLUCOSE BLDC GLUCOMTR-MCNC: 129 MG/DL — HIGH (ref 70–99)
GLUCOSE BLDC GLUCOMTR-MCNC: 134 MG/DL — HIGH (ref 70–99)
GLUCOSE BLDC GLUCOMTR-MCNC: 182 MG/DL — HIGH (ref 70–99)
GLUCOSE BLDC GLUCOMTR-MCNC: 205 MG/DL — HIGH (ref 70–99)
GLUCOSE BLDC GLUCOMTR-MCNC: 207 MG/DL — HIGH (ref 70–99)
GLUCOSE BLDC GLUCOMTR-MCNC: 284 MG/DL — HIGH (ref 70–99)
GLUCOSE BLDC GLUCOMTR-MCNC: 286 MG/DL — HIGH (ref 70–99)
GLUCOSE BLDC GLUCOMTR-MCNC: 292 MG/DL — HIGH (ref 70–99)
GLUCOSE BLDC GLUCOMTR-MCNC: 316 MG/DL — HIGH (ref 70–99)
GLUCOSE BLDC GLUCOMTR-MCNC: 317 MG/DL — HIGH (ref 70–99)
GLUCOSE BLDC GLUCOMTR-MCNC: 334 MG/DL — HIGH (ref 70–99)
GLUCOSE SERPL-MCNC: 184 MG/DL — HIGH (ref 70–99)
GLUCOSE SERPL-MCNC: 229 MG/DL — HIGH (ref 70–99)
GLUCOSE SERPL-MCNC: 272 MG/DL — HIGH (ref 70–99)
GLUCOSE SERPL-MCNC: 322 MG/DL — HIGH (ref 70–99)
GLUCOSE SERPL-MCNC: 324 MG/DL — HIGH (ref 70–99)
HCT VFR BLD CALC: 22.1 % — LOW (ref 39–50)
HCT VFR BLD CALC: 23.2 % — LOW (ref 39–50)
HCT VFR BLD CALC: 24.6 % — LOW (ref 39–50)
HCT VFR BLD CALC: 26.4 % — LOW (ref 39–50)
HCT VFR BLD CALC: 27.3 % — LOW (ref 39–50)
HCT VFR BLD CALC: 28.5 % — LOW (ref 39–50)
HGB BLD-MCNC: 7.5 G/DL — LOW (ref 13–17)
HGB BLD-MCNC: 7.8 G/DL — LOW (ref 13–17)
HGB BLD-MCNC: 8.1 G/DL — LOW (ref 13–17)
HGB BLD-MCNC: 8.6 G/DL — LOW (ref 13–17)
HGB BLD-MCNC: 9.2 G/DL — LOW (ref 13–17)
HGB BLD-MCNC: 9.2 G/DL — LOW (ref 13–17)
INR BLD: 1.5 RATIO — HIGH (ref 0.88–1.16)
INR BLD: 1.86 RATIO — HIGH (ref 0.88–1.16)
INR BLD: 2.12 RATIO — HIGH (ref 0.88–1.16)
INR BLD: 2.34 RATIO — HIGH (ref 0.88–1.16)
INR BLD: 2.51 RATIO — HIGH (ref 0.88–1.16)
MAGNESIUM SERPL-MCNC: 1.9 MG/DL — SIGNIFICANT CHANGE UP (ref 1.6–2.6)
MAGNESIUM SERPL-MCNC: 2 MG/DL — SIGNIFICANT CHANGE UP (ref 1.6–2.6)
MAGNESIUM SERPL-MCNC: 2.1 MG/DL — SIGNIFICANT CHANGE UP (ref 1.6–2.6)
MCHC RBC-ENTMCNC: 32.3 GM/DL — SIGNIFICANT CHANGE UP (ref 32–36)
MCHC RBC-ENTMCNC: 32.6 GM/DL — SIGNIFICANT CHANGE UP (ref 32–36)
MCHC RBC-ENTMCNC: 32.9 GM/DL — SIGNIFICANT CHANGE UP (ref 32–36)
MCHC RBC-ENTMCNC: 32.9 PG — SIGNIFICANT CHANGE UP (ref 27–34)
MCHC RBC-ENTMCNC: 32.9 PG — SIGNIFICANT CHANGE UP (ref 27–34)
MCHC RBC-ENTMCNC: 33 PG — SIGNIFICANT CHANGE UP (ref 27–34)
MCHC RBC-ENTMCNC: 33.1 PG — SIGNIFICANT CHANGE UP (ref 27–34)
MCHC RBC-ENTMCNC: 33.2 PG — SIGNIFICANT CHANGE UP (ref 27–34)
MCHC RBC-ENTMCNC: 33.6 GM/DL — SIGNIFICANT CHANGE UP (ref 32–36)
MCHC RBC-ENTMCNC: 33.7 GM/DL — SIGNIFICANT CHANGE UP (ref 32–36)
MCHC RBC-ENTMCNC: 33.9 GM/DL — SIGNIFICANT CHANGE UP (ref 32–36)
MCHC RBC-ENTMCNC: 33.9 PG — SIGNIFICANT CHANGE UP (ref 27–34)
MCV RBC AUTO: 100 FL — SIGNIFICANT CHANGE UP (ref 80–100)
MCV RBC AUTO: 100.7 FL — HIGH (ref 80–100)
MCV RBC AUTO: 101.1 FL — HIGH (ref 80–100)
MCV RBC AUTO: 102.9 FL — HIGH (ref 80–100)
MCV RBC AUTO: 96.9 FL — SIGNIFICANT CHANGE UP (ref 80–100)
MCV RBC AUTO: 98.3 FL — SIGNIFICANT CHANGE UP (ref 80–100)
NRBC # BLD: 0 /100 WBCS — SIGNIFICANT CHANGE UP (ref 0–0)
PHOSPHATE SERPL-MCNC: 2.2 MG/DL — LOW (ref 2.5–4.5)
PHOSPHATE SERPL-MCNC: 2.3 MG/DL — LOW (ref 2.5–4.5)
PHOSPHATE SERPL-MCNC: 3.2 MG/DL — SIGNIFICANT CHANGE UP (ref 2.5–4.5)
PHOSPHATE SERPL-MCNC: 3.9 MG/DL — SIGNIFICANT CHANGE UP (ref 2.5–4.5)
PHOSPHATE SERPL-MCNC: 4 MG/DL — SIGNIFICANT CHANGE UP (ref 2.5–4.5)
PLATELET # BLD AUTO: 60 K/UL — LOW (ref 150–400)
PLATELET # BLD AUTO: 61 K/UL — LOW (ref 150–400)
PLATELET # BLD AUTO: 69 K/UL — LOW (ref 150–400)
PLATELET # BLD AUTO: 69 K/UL — LOW (ref 150–400)
PLATELET # BLD AUTO: 73 K/UL — LOW (ref 150–400)
PLATELET # BLD AUTO: 76 K/UL — LOW (ref 150–400)
POTASSIUM SERPL-MCNC: 3.7 MMOL/L — SIGNIFICANT CHANGE UP (ref 3.5–5.3)
POTASSIUM SERPL-MCNC: 4.3 MMOL/L — SIGNIFICANT CHANGE UP (ref 3.5–5.3)
POTASSIUM SERPL-MCNC: 4.5 MMOL/L — SIGNIFICANT CHANGE UP (ref 3.5–5.3)
POTASSIUM SERPL-MCNC: 4.9 MMOL/L — SIGNIFICANT CHANGE UP (ref 3.5–5.3)
POTASSIUM SERPL-MCNC: 4.9 MMOL/L — SIGNIFICANT CHANGE UP (ref 3.5–5.3)
POTASSIUM SERPL-SCNC: 3.7 MMOL/L — SIGNIFICANT CHANGE UP (ref 3.5–5.3)
POTASSIUM SERPL-SCNC: 4.3 MMOL/L — SIGNIFICANT CHANGE UP (ref 3.5–5.3)
POTASSIUM SERPL-SCNC: 4.5 MMOL/L — SIGNIFICANT CHANGE UP (ref 3.5–5.3)
POTASSIUM SERPL-SCNC: 4.9 MMOL/L — SIGNIFICANT CHANGE UP (ref 3.5–5.3)
POTASSIUM SERPL-SCNC: 4.9 MMOL/L — SIGNIFICANT CHANGE UP (ref 3.5–5.3)
PROTHROM AB SERPL-ACNC: 17.3 SEC — HIGH (ref 10–12.9)
PROTHROM AB SERPL-ACNC: 21.6 SEC — HIGH (ref 10–12.9)
PROTHROM AB SERPL-ACNC: 24.9 SEC — HIGH (ref 10–12.9)
PROTHROM AB SERPL-ACNC: 27.6 SEC — HIGH (ref 10–12.9)
PROTHROM AB SERPL-ACNC: 29.7 SEC — HIGH (ref 10–12.9)
RBC # BLD: 2.28 M/UL — LOW (ref 4.2–5.8)
RBC # BLD: 2.36 M/UL — LOW (ref 4.2–5.8)
RBC # BLD: 2.46 M/UL — LOW (ref 4.2–5.8)
RBC # BLD: 2.61 M/UL — LOW (ref 4.2–5.8)
RBC # BLD: 2.71 M/UL — LOW (ref 4.2–5.8)
RBC # BLD: 2.77 M/UL — LOW (ref 4.2–5.8)
RBC # FLD: 20.4 % — HIGH (ref 10.3–14.5)
RBC # FLD: 21.1 % — HIGH (ref 10.3–14.5)
RBC # FLD: 21.7 % — HIGH (ref 10.3–14.5)
RBC # FLD: 22 % — HIGH (ref 10.3–14.5)
RBC # FLD: 22.4 % — HIGH (ref 10.3–14.5)
RBC # FLD: 22.5 % — HIGH (ref 10.3–14.5)
SODIUM SERPL-SCNC: 134 MMOL/L — LOW (ref 135–145)
SODIUM SERPL-SCNC: 134 MMOL/L — LOW (ref 135–145)
SODIUM SERPL-SCNC: 135 MMOL/L — SIGNIFICANT CHANGE UP (ref 135–145)
SODIUM SERPL-SCNC: 135 MMOL/L — SIGNIFICANT CHANGE UP (ref 135–145)
SODIUM SERPL-SCNC: 137 MMOL/L — SIGNIFICANT CHANGE UP (ref 135–145)
WBC # BLD: 22.56 K/UL — HIGH (ref 3.8–10.5)
WBC # BLD: 26.18 K/UL — HIGH (ref 3.8–10.5)
WBC # BLD: 27.03 K/UL — HIGH (ref 3.8–10.5)
WBC # BLD: 29.64 K/UL — HIGH (ref 3.8–10.5)
WBC # BLD: 31.36 K/UL — HIGH (ref 3.8–10.5)
WBC # BLD: 33.08 K/UL — HIGH (ref 3.8–10.5)
WBC # FLD AUTO: 22.56 K/UL — HIGH (ref 3.8–10.5)
WBC # FLD AUTO: 26.18 K/UL — HIGH (ref 3.8–10.5)
WBC # FLD AUTO: 27.03 K/UL — HIGH (ref 3.8–10.5)
WBC # FLD AUTO: 29.64 K/UL — HIGH (ref 3.8–10.5)
WBC # FLD AUTO: 31.36 K/UL — HIGH (ref 3.8–10.5)
WBC # FLD AUTO: 33.08 K/UL — HIGH (ref 3.8–10.5)

## 2020-03-16 PROCEDURE — 99233 SBSQ HOSP IP/OBS HIGH 50: CPT

## 2020-03-16 PROCEDURE — 99291 CRITICAL CARE FIRST HOUR: CPT

## 2020-03-16 PROCEDURE — 99232 SBSQ HOSP IP/OBS MODERATE 35: CPT

## 2020-03-16 RX ORDER — INSULIN GLARGINE 100 [IU]/ML
25 INJECTION, SOLUTION SUBCUTANEOUS AT BEDTIME
Refills: 0 | Status: DISCONTINUED | OUTPATIENT
Start: 2020-03-16 | End: 2020-03-16

## 2020-03-16 RX ORDER — SODIUM CHLORIDE 9 MG/ML
1000 INJECTION, SOLUTION INTRAVENOUS
Refills: 0 | Status: DISCONTINUED | OUTPATIENT
Start: 2020-03-16 | End: 2020-03-16

## 2020-03-16 RX ORDER — POTASSIUM CHLORIDE 20 MEQ
20 PACKET (EA) ORAL ONCE
Refills: 0 | Status: COMPLETED | OUTPATIENT
Start: 2020-03-16 | End: 2020-03-16

## 2020-03-16 RX ORDER — ALBUMIN HUMAN 25 %
250 VIAL (ML) INTRAVENOUS EVERY 6 HOURS
Refills: 0 | Status: DISCONTINUED | OUTPATIENT
Start: 2020-03-16 | End: 2020-03-17

## 2020-03-16 RX ORDER — POTASSIUM PHOSPHATE, MONOBASIC POTASSIUM PHOSPHATE, DIBASIC 236; 224 MG/ML; MG/ML
30 INJECTION, SOLUTION INTRAVENOUS ONCE
Refills: 0 | Status: COMPLETED | OUTPATIENT
Start: 2020-03-16 | End: 2020-03-16

## 2020-03-16 RX ORDER — TAMSULOSIN HYDROCHLORIDE 0.4 MG/1
0.4 CAPSULE ORAL AT BEDTIME
Refills: 0 | Status: DISCONTINUED | OUTPATIENT
Start: 2020-03-16 | End: 2020-03-24

## 2020-03-16 RX ORDER — MAGNESIUM SULFATE 500 MG/ML
2 VIAL (ML) INJECTION ONCE
Refills: 0 | Status: COMPLETED | OUTPATIENT
Start: 2020-03-16 | End: 2020-03-16

## 2020-03-16 RX ORDER — INSULIN HUMAN 100 [IU]/ML
2 INJECTION, SOLUTION SUBCUTANEOUS
Qty: 100 | Refills: 0 | Status: DISCONTINUED | OUTPATIENT
Start: 2020-03-16 | End: 2020-03-16

## 2020-03-16 RX ORDER — INSULIN LISPRO 100/ML
VIAL (ML) SUBCUTANEOUS EVERY 4 HOURS
Refills: 0 | Status: DISCONTINUED | OUTPATIENT
Start: 2020-03-16 | End: 2020-03-16

## 2020-03-16 RX ORDER — INSULIN HUMAN 100 [IU]/ML
3 INJECTION, SOLUTION SUBCUTANEOUS
Qty: 100 | Refills: 0 | Status: DISCONTINUED | OUTPATIENT
Start: 2020-03-16 | End: 2020-03-16

## 2020-03-16 RX ORDER — LACTULOSE 10 G/15ML
30 SOLUTION ORAL THREE TIMES A DAY
Refills: 0 | Status: DISCONTINUED | OUTPATIENT
Start: 2020-03-16 | End: 2020-03-24

## 2020-03-16 RX ORDER — INSULIN HUMAN 100 [IU]/ML
7 INJECTION, SOLUTION SUBCUTANEOUS
Qty: 100 | Refills: 0 | Status: DISCONTINUED | OUTPATIENT
Start: 2020-03-16 | End: 2020-03-17

## 2020-03-16 RX ORDER — FOLIC ACID 0.8 MG
1 TABLET ORAL DAILY
Refills: 0 | Status: DISCONTINUED | OUTPATIENT
Start: 2020-03-16 | End: 2020-03-24

## 2020-03-16 RX ORDER — WATER FOR INHALATION
1000 VIAL, NEBULIZER (ML) INHALATION
Refills: 0 | Status: DISCONTINUED | OUTPATIENT
Start: 2020-03-16 | End: 2020-03-17

## 2020-03-16 RX ORDER — PANTOPRAZOLE SODIUM 20 MG/1
40 TABLET, DELAYED RELEASE ORAL
Refills: 0 | Status: DISCONTINUED | OUTPATIENT
Start: 2020-03-16 | End: 2020-03-24

## 2020-03-16 RX ORDER — INSULIN HUMAN 100 [IU]/ML
3 INJECTION, SOLUTION SUBCUTANEOUS ONCE
Refills: 0 | Status: COMPLETED | OUTPATIENT
Start: 2020-03-16 | End: 2020-03-16

## 2020-03-16 RX ORDER — PANTOPRAZOLE SODIUM 20 MG/1
40 TABLET, DELAYED RELEASE ORAL DAILY
Refills: 0 | Status: DISCONTINUED | OUTPATIENT
Start: 2020-03-16 | End: 2020-03-16

## 2020-03-16 RX ORDER — ACETAMINOPHEN 500 MG
325 TABLET ORAL ONCE
Refills: 0 | Status: COMPLETED | OUTPATIENT
Start: 2020-03-16 | End: 2020-03-16

## 2020-03-16 RX ADMIN — Medication 250 MILLIMOLE(S): at 02:50

## 2020-03-16 RX ADMIN — LACTULOSE 30 GRAM(S): 10 SOLUTION ORAL at 21:02

## 2020-03-16 RX ADMIN — Medication 20 MILLIEQUIVALENT(S): at 20:36

## 2020-03-16 RX ADMIN — POTASSIUM PHOSPHATE, MONOBASIC POTASSIUM PHOSPHATE, DIBASIC 83.33 MILLIMOLE(S): 236; 224 INJECTION, SOLUTION INTRAVENOUS at 23:32

## 2020-03-16 RX ADMIN — PANTOPRAZOLE SODIUM 40 MILLIGRAM(S): 20 TABLET, DELAYED RELEASE ORAL at 11:59

## 2020-03-16 RX ADMIN — Medication 50 GRAM(S): at 11:26

## 2020-03-16 RX ADMIN — MEROPENEM 100 MILLIGRAM(S): 1 INJECTION INTRAVENOUS at 13:00

## 2020-03-16 RX ADMIN — LACTULOSE 30 GRAM(S): 10 SOLUTION ORAL at 13:00

## 2020-03-16 RX ADMIN — Medication 325 MILLIGRAM(S): at 17:20

## 2020-03-16 RX ADMIN — Medication 250 MILLIMOLE(S): at 05:29

## 2020-03-16 RX ADMIN — Medication 19.4 MICROGRAM(S)/KG/MIN: at 17:28

## 2020-03-16 RX ADMIN — Medication 125 MILLILITER(S): at 21:03

## 2020-03-16 RX ADMIN — TAMSULOSIN HYDROCHLORIDE 0.4 MILLIGRAM(S): 0.4 CAPSULE ORAL at 21:02

## 2020-03-16 RX ADMIN — INSULIN HUMAN 2 UNIT(S)/HR: 100 INJECTION, SOLUTION SUBCUTANEOUS at 11:24

## 2020-03-16 RX ADMIN — SODIUM CHLORIDE 125 MILLILITER(S): 9 INJECTION, SOLUTION INTRAVENOUS at 08:58

## 2020-03-16 RX ADMIN — Medication 325 MILLIGRAM(S): at 17:50

## 2020-03-16 RX ADMIN — Medication 1 MILLIGRAM(S): at 21:02

## 2020-03-16 RX ADMIN — INSULIN HUMAN 3 UNIT(S): 100 INJECTION, SOLUTION SUBCUTANEOUS at 11:24

## 2020-03-16 RX ADMIN — Medication 125 MILLILITER(S): at 22:54

## 2020-03-16 RX ADMIN — SODIUM CHLORIDE 125 MILLILITER(S): 9 INJECTION, SOLUTION INTRAVENOUS at 11:24

## 2020-03-16 RX ADMIN — INSULIN HUMAN 3 UNIT(S)/HR: 100 INJECTION, SOLUTION SUBCUTANEOUS at 11:36

## 2020-03-16 RX ADMIN — Medication 6: at 05:29

## 2020-03-16 RX ADMIN — SODIUM CHLORIDE 125 MILLILITER(S): 9 INJECTION, SOLUTION INTRAVENOUS at 17:29

## 2020-03-16 RX ADMIN — Medication 125 MILLILITER(S): at 14:47

## 2020-03-16 RX ADMIN — Medication 19.4 MICROGRAM(S)/KG/MIN: at 08:58

## 2020-03-16 RX ADMIN — Medication 102 MILLIGRAM(S): at 00:04

## 2020-03-16 RX ADMIN — CHLORHEXIDINE GLUCONATE 1 APPLICATION(S): 213 SOLUTION TOPICAL at 05:37

## 2020-03-16 RX ADMIN — PROTHROMBIN COMPLEX CONCENTRATE (HUMAN) 400 INTERNATIONAL UNIT(S): 25.5; 16.5; 24; 22; 22; 26 POWDER, FOR SOLUTION INTRAVENOUS at 00:28

## 2020-03-16 RX ADMIN — SODIUM CHLORIDE 125 MILLILITER(S): 9 INJECTION, SOLUTION INTRAVENOUS at 17:22

## 2020-03-16 RX ADMIN — SODIUM CHLORIDE 125 MILLILITER(S): 9 INJECTION, SOLUTION INTRAVENOUS at 21:04

## 2020-03-16 RX ADMIN — MEROPENEM 100 MILLIGRAM(S): 1 INJECTION INTRAVENOUS at 05:30

## 2020-03-16 RX ADMIN — MEROPENEM 100 MILLIGRAM(S): 1 INJECTION INTRAVENOUS at 22:55

## 2020-03-16 NOTE — PROGRESS NOTE ADULT - REASON FOR ADMISSION
Syncopal episode at home at 0430 this am, LOC for approx 3 minutes per wife, pt had episode of shaking during that time. Hx of multiple falls. Pt reporting abdominal pain for X 1day, no nausea or vomiting, endorses loose stool on lactulose at home    Prostate abscess

## 2020-03-16 NOTE — PROGRESS NOTE ADULT - ATTENDING COMMENTS
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   Recent admission for ESBL E coli Bacteremia and UTI  Patient found to have pyuria on U/A and urine with visible thick sediment  CT A/P (reviewed with radiology) with significant interval increase in prostate size from last month (concerning for abscess)  Could not tolerate MRI  CT A/P (with PO/IV contrast) without concrete abscess but suspicious appearing prostate. Also with fistulous communication with either the posterior wall of the urinary bladder or the urethra.    On 3/15 underwent drainage or prostate abscess   Worsening leukocytosis and septic shock after drainage  I would continue Meropenem at this time  Anticipate may have had transient bacteremia with prostate manipulation    Given prostatitis/UTI/abscess patient is not cleared from ID perspective at this time for OLT    Overall, UTI, Prostatitis, Leukocytosis, Hypotension, Positive Culture Finding (UCx), Pre-OLT    I will continue to follow. Please feel free to contact me with any further questions.    Eusebio Pérez M.D.  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   Recent admission for ESBL E coli Bacteremia and UTI  Patient found to have pyuria on U/A and urine with visible thick sediment  CT A/P (reviewed with radiology) with significant interval increase in prostate size from last month (concerning for abscess)  Could not tolerate MRI  CT A/P (with PO/IV contrast) without concrete abscess but suspicious appearing prostate. Also with fistulous communication with either the posterior wall of the urinary bladder or the urethra.    On 3/15 underwent drainage or prostate abscess   Worsening leukocytosis and septic shock after drainage  I would continue Meropenem at this time  Anticipate may have had transient bacteremia with prostate manipulation    Given prostatitis/UTI/abscess patient is not cleared from ID perspective at this time for OLT    Overall, UTI, Prostate Abscess, Leukocytosis, Septic Shock, Positive Culture Finding (UCx), Pre-OLT    I will continue to follow. Please feel free to contact me with any further questions.    Eusebio Pérez M.D.  [facility] Division of Infectious Disease  8AM-5PM: Pager Number [phone]  After Hours (or if no response): Please contact the Infectious Diseases Office at [phone]     The above assessment and plan were discussed with Dr Argueta

## 2020-03-16 NOTE — DIETITIAN INITIAL EVALUATION ADULT. - FACTORS AFF FOOD INTAKE
pt reports being thirsty at this time; pt reports no issues chewing/swallowing PTA, noted pt is on Lactulose

## 2020-03-16 NOTE — PROGRESS NOTE ADULT - SUBJECTIVE AND OBJECTIVE BOX
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 Collateral information obtained from chart review as patient is not communicative during evaluation. During admission patient had CT scan which showed diffuse low density appearance of the prostate concerning for abscess while patient reporting difficulty with urination but denying dysuria, urgency, frequency, gross hematuria, or rectal pain/pressure. Patient was take to OR with urology on 3/15/20 and underwent cystoscopy and transurethral drainage of prostatic abscess. Postop patient developed hypotension, altered mental status and bright red blood in bob bag. SICU consulted for evaluation. Vasopressor support was initiated with Levo and Vaso and MTP was activated, 4 units of FFP and 4units of PRBC were transfused, also received 1unit platelets and vitamin K.    24 HOUR EVENTS:  - H/H stable on hemorrhage watch  - INR downtrending  - Persistently tahchycardic    SUBJECTIVE/ROS:  [ ] A ten-point review of systems was otherwise negative except as noted.  [ ] Due to altered mental status/intubation, subjective information were not able to be obtained from the patient. History was obtained, to the extent possible, from review of the chart and collateral sources of information.      NEURO  RASS:     GCS:     CAM ICU:  Exam: awake, alert, oriented  Meds:   [x] Adequacy of sedation and pain control has been assessed and adjusted      RESPIRATORY  RR: 19 (03-16-20 @ 03:15) (4 - 23)  SpO2: 98% (03-16-20 @ 03:15) (93% - 100%)  Wt(kg): --  Exam: unlabored, clear to auscultation bilaterally  Mechanical Ventilation:   ABG - ( 16 Mar 2020 00:50 )  pH: 7.37  /  pCO2: 25    /  pO2: 118   / HCO3: 14    / Base Excess: -9.4  /  SaO2: 99      Lactate: x                [N/A] Extubation Readiness Assessed  Meds:       CARDIOVASCULAR  HR: 131 (03-16-20 @ 03:15) (68 - 148)  BP: 123/57 (03-15-20 @ 22:15) (72/42 - 123/57)  BP(mean): 82 (03-15-20 @ 22:15) (65 - 84)  ABP: 110/46 (03-16-20 @ 03:15) (70/26 - 125/50)  ABP(mean): 73 (03-16-20 @ 03:15) (40 - 81)  Wt(kg): --  CVP(cm H2O): --      Exam: regular rate and rhythm  Cardiac Rhythm: sinus  Perfusion     [x]Adequate   [ ]Inadequate  Mentation   [x]Normal       [ ]Reduced  Extremities  [x]Warm         [ ]Cool  Volume Status [ ]Hypervolemic [x]Euvolemic [ ]Hypovolemic  Meds: norepinephrine Infusion 0.09 MICROgram(s)/kG/Min IV Continuous <Continuous>        GI/NUTRITION  Exam: soft, nontender, nondistended, incision C/D/I  Diet:  Meds: pantoprazole  Injectable 40 milliGRAM(s) IV Push daily      GENITOURINARY  I&O's Detail    03-14 @ 07:01  -  03-15 @ 07:00  --------------------------------------------------------  IN:    lactated ringers.: 100 mL    Oral Fluid: 1165 mL  Total IN: 1265 mL    OUT:    Indwelling Catheter - Urethral: 800 mL    Voided: 700 mL  Total OUT: 1500 mL    Total NET: -235 mL      03-15 @ 07:01  -  03-16 @ 03:56  --------------------------------------------------------  IN:    dextrose 5%: 500 mL    IV PiggyBack: 537.5 mL    multiple electrolytes Injection Type 1multiple electrolytes Injection Type 1: 125 mL    norepinephrine Infusion: 149 mL    Packed Red Blood Cells: 1200 mL    Plasma: 1000 mL    Solution: 350 mL  Total IN: 3861.5 mL    OUT:    Indwelling Catheter - Urethral: 770 mL  Total OUT: 770 mL    Total NET: 3091.5 mL        Weight (kg): 115.2 (03-15 @ 10:50)  03-16    135  |  97  |  26<H>  ----------------------------<  184<H>  4.9   |  13<L>  |  1.53<H>    Ca    10.8<H>      16 Mar 2020 01:03  Phos  2.2     03-16  Mg     2.1     03-16    TPro  5.2<L>  /  Alb  3.1<L>  /  TBili  6.8<H>  /  DBili  1.5<H>  /  AST  31  /  ALT  16  /  AlkPhos  91  03-15    [ ] Bob catheter, indication: N/A  Meds: dextrose 5% 1000 milliLiter(s) IV Continuous <Continuous>  sodium phosphate IVPB 15 milliMole(s) IV Intermittent every 1 hour        HEMATOLOGIC  Meds:   [x] VTE Prophylaxis                        9.2    31.36 )-----------( 76       ( 16 Mar 2020 01:03 )             27.3     PT/INR - ( 16 Mar 2020 01:03 )   PT: 17.3 sec;   INR: 1.50 ratio         PTT - ( 16 Mar 2020 01:03 )  PTT:43.3 sec  Transfusion     [ ] PRBC   [ ] Platelets   [ ] FFP   [ ] Cryoprecipitate      INFECTIOUS DISEASES  WBC Count: 31.36 K/uL (03-16 @ 01:03)  WBC Count: 29.64 K/uL (03-15 @ 23:43)  WBC Count: 24.80 K/uL (03-15 @ 22:40)  WBC Count: 31.57 K/uL (03-15 @ 21:34)  WBC Count: 6.26 K/uL (03-15 @ 07:22)    RECENT CULTURES:    Meds: meropenem  IVPB 1000 milliGRAM(s) IV Intermittent every 8 hours        ENDOCRINE  CAPILLARY BLOOD GLUCOSE      POCT Blood Glucose.: 106 mg/dL (15 Mar 2020 20:50)  POCT Blood Glucose.: 122 mg/dL (15 Mar 2020 17:37)  POCT Blood Glucose.: 182 mg/dL (15 Mar 2020 08:32)    Meds: insulin glargine Injectable (LANTUS) 15 Unit(s) SubCutaneous at bedtime  insulin lispro (HumaLOG) corrective regimen sliding scale   SubCutaneous every 6 hours        ACCESS DEVICES:  [ ] Peripheral IV  [ ] Central Venous Line	[ ] R	[ ] L	[ ] IJ	[ ] Fem	[ ] SC	Placed:   [ ] Arterial Line		[ ] R	[ ] L	[ ] Fem	[ ] Rad	[ ] Ax	Placed:   [ ] PICC:					[ ] Mediport  [ ] Urinary Catheter, Date Placed:   [x] Necessity of urinary, arterial, and venous catheters discussed    OTHER MEDICATIONS:  chlorhexidine 2% Cloths 1 Application(s) Topical <User Schedule>      CODE STATUS:      IMAGING:

## 2020-03-16 NOTE — PROGRESS NOTE ADULT - ASSESSMENT
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 syncope in setting of UTI/abscess.  He is s/p TURP, and now gross hematuria.  Had AMS and hypotension overnight, likely a component of both septic and hypovolemic shock.  s/p mm blood products, and not on IV pressor support    - Continue to trend H/H and transfuse as needed  - Wean Levophed as tolerated  - Hold diuretics as not vol ol  - Eventually to resume midodrine  - I doubt this was arrhythmic but his prolonged QTC has been concerning. This was seen on his previous admission as well  - Avoid QTC prolonging drugs  - Monitor and replete electrolytes. Keep K>4.0 and Mg>2.0. Replete Mg today  - cont abx  - check daily ekg    - No signs of significant ischemia. Cath with non obs disease  - echo in 2/2020 with normal LV function. No need to repeat.   - urology for TURP/abscess drainage today    - Further cardiac workup will depend on clinical course.   - All other workup per primary team. Will followup.

## 2020-03-16 NOTE — DIETITIAN INITIAL EVALUATION ADULT. - PROBLEM SELECTOR PLAN 2
Falls associated with general weakness with UTI symptoms   most likely from infection/ Vasovagal   close monitoring   s/p IVF in the ED   will treat infection with Ertapenem  HOLD diuretics for now

## 2020-03-16 NOTE — PROGRESS NOTE ADULT - ASSESSMENT
65 y/o M w/ uncontrolled Type 2 DM w/ hyperglycemia complicated by neuropathy and retinopathy, JEAN cirrhosis found to have a low am cortisol on workup for orthostasis.

## 2020-03-16 NOTE — PROGRESS NOTE ADULT - SUBJECTIVE AND OBJECTIVE BOX
[facility] - Sushila Dhaliwal, Doug, Morenita, Clay Rollins Savella  Office Number:  [phone]    Patient resting comfortably in bed in NAD. s/p RRT for hypotension and AMS with LOC.  Had gross hematuria, and transfused multiple blood products overnight.  Also on broad spectrum abx for suspected sepsis    F/U for:  Hypotension    Telemetry:  NSR    MEDICATIONS  (STANDING):  chlorhexidine 2% Cloths 1 Application(s) Topical <User Schedule>  dextrose 5% 1000 milliLiter(s) (125 mL/Hr) IV Continuous <Continuous>  insulin glargine Injectable (LANTUS) 15 Unit(s) SubCutaneous at bedtime  insulin lispro (HumaLOG) corrective regimen sliding scale   SubCutaneous every 4 hours  meropenem  IVPB 1000 milliGRAM(s) IV Intermittent every 8 hours  norepinephrine Infusion 0.09 MICROgram(s)/kG/Min (19.4 mL/Hr) IV Continuous <Continuous>  pantoprazole  Injectable 40 milliGRAM(s) IV Push daily    MEDICATIONS  (PRN):      Allergies    codeine (Anaphylaxis)    Intolerances        Vital Signs Last 24 Hrs  T(C): 37 (16 Mar 2020 03:00), Max: 38.5 (15 Mar 2020 20:41)  T(F): 98.6 (16 Mar 2020 03:00), Max: 101.3 (15 Mar 2020 20:41)  HR: 111 (16 Mar 2020 07:00) (68 - 148)  BP: 123/57 (15 Mar 2020 22:15) (72/42 - 123/57)  BP(mean): 82 (15 Mar 2020 22:15) (65 - 84)  RR: 13 (16 Mar 2020 07:00) (4 - 23)  SpO2: 97% (16 Mar 2020 07:00) (93% - 100%)    I&O's Summary    15 Mar 2020 07:01  -  16 Mar 2020 07:00  --------------------------------------------------------  IN: 4607.1 mL / OUT: 1200 mL / NET: 3407.1 mL        ON EXAM:    General: NAD,lethargic  HEENT: Mucous membranes are dry, anicteric  Lungs: Non-labored, breath sounds are clear bilaterally, No wheezing, rales or rhonchi.  Decreased bs b/l  Cardiovascular: Regular, S1 and S2, no murmurs, rubs, or gallops.  distant  Gastrointestinal: Bowel Sounds present, soft, nontender.  distended  Lymph: Mild b/l peripheral edema. No lymphadenopathy.  Skin: No rashes or ulcers  Psych:  Unable to properly assess    LABS: All Labs Reviewed:                        8.6    33.08 )-----------( 69       ( 16 Mar 2020 04:43 )             26.4                         9.2    31.36 )-----------( 76       ( 16 Mar 2020 01:03 )             27.3                         9.2    29.64 )-----------( 73       ( 15 Mar 2020 23:43 )             28.5     16 Mar 2020 04:43    134    |  96     |  28     ----------------------------<  272    4.9     |  14     |  1.42   16 Mar 2020 01:03    135    |  97     |  26     ----------------------------<  184    4.9     |  13     |  1.53   15 Mar 2020 22:40    138    |  100    |  24     ----------------------------<  147    4.7     |  10     |  1.53     Ca    10.4       16 Mar 2020 04:43  Ca    10.8       16 Mar 2020 01:03  Ca    9.6        15 Mar 2020 22:40  Phos  3.9       16 Mar 2020 04:43  Phos  2.2       16 Mar 2020 01:03  Phos  2.1       15 Mar 2020 22:40  Mg     2.0       16 Mar 2020 04:43  Mg     2.1       16 Mar 2020 01:03  Mg     1.7       15 Mar 2020 22:40    TPro  5.2    /  Alb  3.1    /  TBili  6.8    /  DBili  1.5    /  AST  31     /  ALT  16     /  AlkPhos  91     15 Mar 2020 22:40  TPro  5.7    /  Alb  3.3    /  TBili  8.4    /  DBili  x      /  AST  75     /  ALT  20     /  AlkPhos  102    15 Mar 2020 21:34  TPro  5.8    /  Alb  3.4    /  TBili  7.1    /  DBili  x      /  AST  18     /  ALT  13     /  AlkPhos  155    15 Mar 2020 07:22    PT/INR - ( 16 Mar 2020 04:43 )   PT: 21.6 sec;   INR: 1.86 ratio         PTT - ( 16 Mar 2020 04:43 )  PTT:47.4 sec  CARDIAC MARKERS ( 15 Mar 2020 22:40 )  x     / x     / 352 U/L / x     / 2.3 ng/mL

## 2020-03-16 NOTE — PROGRESS NOTE ADULT - ASSESSMENT
ASSESSMENT:  JESSICA MARTIN is a 64y Male with pmh DM2, HLD, obesity, BPH, Orthostatic hypotension, GERD, HTN, Neuropathy, Obesity, HFpEF with mild LV diastolic dysfunction, and decompensated JEAN cirrhosis complicated by ascites, history of SBP, hepatic encephalopathy, and chronic anemia with a history of duodenal ulcer and duodenal AVM who is UNOS listed for liver transplantation at [facility] admitted with fall at home, general weakness/syncope s/p cystoscopy and transurethral drainage of prostatic abscess (3/15/20) c/b postop hypotension, altered mental status and bright red blood in bob bag.     -f.u labs  - Wean Levo as tolerated  - Trend lactate 11.5 and downtrending  - Follow up bob output irrigation as needed   - Trend Creat  - Meropenem for broad spectrum and coverage of previous ESBL E. Coli in Urine (3/10/20)  -DVT prophylaxis  -f/u GI, HEME  - SICU care appreciated

## 2020-03-16 NOTE — DIETITIAN INITIAL EVALUATION ADULT. - OTHER INFO
Pt able to answer all questions at this time, was tired and took time to answer but was able to answer.     Reports good appetite and intake at home. Was consuming 2 meals daily- consisted of various proteins, starches, fruits and vegetables. Pt reports his Finger sticks at home were in the low 100s. States he did not use salt at home and avoided high sodium foods.     Pt reports NKFA. States no micronutrient coverage at home.    Noted per previous RD note from February 2020, pt c UBW of 360 pounds and intentionally lost wt and was down to 251 pounds during that admission. Pt reports his wt at home was 250 pounds about two weeks ago and it has been stable for the last month. Noted in house, wt has been 253.9->260.5->253 pounds, would continue to monitor.     As per RN, pt was able to take his Lactulose PO without any swallowing issues.

## 2020-03-16 NOTE — PROGRESS NOTE ADULT - ASSESSMENT
ASSESSMENT:  JESSICA MARTIN is a 64y Male with pmh DM2, HLD, obesity, BPH, Orthostatic hypotension, GERD, HTN, Neuropathy, Obesity, HFpEF with mild LV diastolic dysfunction, and decompensated JEAN cirrhosis complicated by ascites, history of SBP, hepatic encephalopathy, and chronic anemia with a history of duodenal ulcer and duodenal AVM who is UNOS listed for liver transplantation at [facility] admitted with fall at home, general weakness/syncope s/p cystoscopy and transurethral drainage of prostatic abscess (3/15/20) c/b postop hypotension, altered mental status and bright red blood in bob bag.     PLAN:    NEURO:  - Monitor mental status - now improved, is responsive and arousable   - Avoiding narcotic and sedative medication      RESPIRATORY:   - Saturating well on NC    CARDIOVASCULAR:  - Monitor BP - maintain MAP>65  - Wean Levo as tolerated  - Trend lactate 11.5 and downtrending  - Tachycardic overnight   - Home lasix/midodrine on hold    GI/NUTRITION:  - NPO  - PPI    GENITOURINARY/RENAL:  - Follow up bob output quality and quantity, underwent bedside irrigation by urology  - [id number] @125   - Trend Cr    HEMATOLOGIC:  - s/p hemorrhage watch - H/H stable   - Q4 CBC  - Trend INR - currently downtrending  - DVT ppx on hold    INFECTIOUS DISEASE:  - Meropenem for broad spectrum and coverage of previous ESBL E. Coli in Urine (3/10/20)  - Trend CBC-Leukocytosis ~30       ENDOCRINE:  - Monitor FS  - SSI  - Lantus 15u bedtime - (30 units full dose per Endo recommendations)    DISPO:  - SICU

## 2020-03-16 NOTE — PROGRESS NOTE ADULT - PROBLEM SELECTOR PLAN 2
-patient had low cortisol levels  -he has multiple acute medical issues requiring ICU care  -can repeat cortisol but in the setting of cirrhosis it may be low due to low cortisol binding globulin  -if team feels strongly that patient has AI, can check am cortsiol, cortisol binding globulin in the morning.  This was recommended previously

## 2020-03-16 NOTE — DIETITIAN INITIAL EVALUATION ADULT. - DIET TYPE
noted prior to admission to SICU when pt was on PO diet, 100% consumption of some meals documented on flow sheets if/when medically feasible and able to resume PO diet, consider low sodium, consistent CHO diet as tolerated

## 2020-03-16 NOTE — PROGRESS NOTE ADULT - PROBLEM SELECTOR PLAN 1
-patient with hyperglycemia and an anion gap which is likely multifactorial  -in the setting of critical care, he requires glycemic control and that can be achieved with an insulin gtt  -start insulin drip per ICU protocol; repeat BMP to ensure improvements in metabolic function  -he is also getting medications with Dextrose which may be exacerbating the hyperglycemia  -insulin gtt until improvement and will transition to basal/bolus at that time

## 2020-03-16 NOTE — PROGRESS NOTE ADULT - ASSESSMENT
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 He was found to have pyuria and leukocytosis. His urine is very thick, purulent appearing which is concerning for a colo-vesicular fistula. CTAP shows a large increase in the size of his prostate, which is concerning for abscess. Repeat CT scan shows possible fistula from the prostate to the bladder. Urine culture with ESBL E coli. He is now s/p drainage of prostate abscess, developed septic shock post-op.  At this time, he is also listed for OLT.     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, s/p drainage of prostate abscess, developed septic shock post-op  - Continue meropenem 1g q 8 hours  - Follow up cultures  - Monitor for fevers  - Trend WBCs    Pre OLT evaluation   CMV: negative  Toxo: negative  Hepatitis A: immune  Hepatitis B: not immune  Hepatitis C: negative  HIV: negative  HSV-1: positive  HSV-2: positive  Measles: immune  Mumps: immune  Rubella: immune  Quantiferon: negative  Varicella: immune    Vaccinations  - Prevnar given 12/2019  - Pneumovax given 2/2020  - Influenza given 11/2019    - Give hepatitis B vaccine (hemodialysis dose) this admission    Estefanía Henderson, PGY-4  Infectious Disease Fellow   Pager: [phone]  After 5pm/weekends: [phone]

## 2020-03-16 NOTE — DIETITIAN INITIAL EVALUATION ADULT. - PHYSICAL APPEARANCE
well nourished/other (specify) Pt deferred nutrition focused physical exam at this time as he wanted to rest.   Skin: no pressure injuries   Edema: +1 generalized edema     ht: 6'1", wt: 250 pounds, BMI: 33 kg/m2, IBW: 184 pounds +/- 10%

## 2020-03-16 NOTE — DIETITIAN INITIAL EVALUATION ADULT. - REASON INDICATOR FOR ASSESSMENT
Pt seen for LOS IN SICU.  Source: RN, previous RD evaluation, pt confused/lethargic    Pt admitted S/P fall at home, preceded by vasovagal episode. Noted pt c mild urosepsis, mild hepatic encephalopathy and mild MISA. Noted pt is UNOS listed for liver transplant, MELD- Na 26 on 3/15 as per transplant team. Pt seen for LOS IN SICU.  Source: RN,previous RD evaluation, pt lethargic, had been more confused earlier per RN    Pt admitted S/P fall at home, preceded by vasovagal episode. Noted pt c mild urosepsis, mild hepatic encephalopathy and mild MISA. Noted pt is UNOS listed for liver transplant, MELD- Na 26 on 3/15 as per transplant team. Pt S/P cystoscopy, transurethral drainage or prostatic abscess. Noted pt S/P RRT for hypotension on 3/15 for hypotension and olga blood in [place], admitted to SICU.

## 2020-03-16 NOTE — DIETITIAN INITIAL EVALUATION ADULT. - PROBLEM SELECTOR PLAN 5
No current evidence of SBP ( no abd pain or tenderness , no gross evidence of ascites on exam   if pt start to have abd pain , will get US to search for any fluid pocket for abd paracentesis  MELD Score of 27 f/up with Hepatology team   Plt is >100 today and INR = 1.9 / will hold Chemical DVT prophylaxis today , but if platelet remains stable > 100 K and no bleed / consider Chemical DVT Prophylaxis / in the meantime, SCD today

## 2020-03-16 NOTE — PROGRESS NOTE ADULT - ATTENDING COMMENTS
Neuro- RASS 0, avoid benzodiazepines. Resume lactulose and rifaximin. [place] 0.  Cardio- Hemorrhagic shock, overnight 4UFFP, 2UPRBC, 1UPlt, PCCx1. H/H stable, continue monitoring. Low dose norepinephrine, weaning to MAP goal of 60.  Resp- No active issues.  GI- Decompensated JEAN cirrhosis, due to septic shock. NaMELD 26, listed on hold due to hemorrhagic/septic shock. Transplant hepatology following.  ID- Prostatic abscess drained, on meropenem, ID following. Septic shock, downtrending lactate. Continue resuscitation.    Renal- MISA/HRS improving after resuscitation.   Heme- Stable H/H. Leukocytosis. Improved coagulopathy. No further evidence of bleeding, hemorrhagic watch. Transfuse PRN.  FEN- Will consider 25g albumin q6h and vasopressin if unable to wean norepinephrine in a few hours.  - bloody [place], no changes.  Hold DVT prophylaxis.  Full code  Dispo Critical SICU Neuro- RASS 0, avoid benzodiazepines. Resume lactulose and rifaximin. [place] 0.  Cardio- Hemorrhagic shock, overnight 4UFFP, 2UPRBC, 1UPlt, PCCx1. H/H stable, continue monitoring. Low dose norepinephrine, weaning to MAP goal of 60.  Resp- No active issues.  GI- Decompensated JEAN cirrhosis, due to septic shock. NaMELD 26, listed on hold due to hemorrhagic/septic shock. Transplant hepatology following.  ID- Prostatic abscess drained, on meropenem, ID following. Septic shock, downtrending lactate. Continue resuscitation.    Renal- MISA/HRS improving after resuscitation.   Heme- Stable H/H. Leukocytosis. Following INR. No further evidence of bleeding, hemorrhagic watch. Transfuse PRN.  FEN- Will consider 25g albumin q6h and vasopressin if unable to wean norepinephrine in a few hours.  - bloody [place], no changes.  Hold DVT prophylaxis.  Full code  Dispo Critical SICU Neuro- RASS 0, avoid benzodiazepines. Resume lactulose and rifaximin. [place] 0.  Cardio- Hemorrhagic shock, overnight 4UFFP, 2UPRBC, 1UPlt, PCCx1. H/H stable, continue monitoring. Low dose norepinephrine, weaning to MAP goal of 60.  Resp- No active issues.  GI- Decompensated JEAN cirrhosis, due to septic shock. NaMELD 26, listed on hold due to hemorrhagic/septic shock. Transplant hepatology following.  ID- Prostatic abscess drained, on meropenem, ID following. Septic shock, downtrending lactate. Continue resuscitation.    Renal- MISA/HRS improving after resuscitation.   Heme- Stable H/H. Leukocytosis. Following INR. No further evidence of bleeding, hemorrhagic watch. Transfuse PRN.  FEN- Will consider 25g albumin q6h and vasopressin if unable to wean norepinephrine in a few hours. Hyperglycemia insulin gtt.  - bloody [place], no changes.  Hold DVT prophylaxis.  Full code  Dispo Critical SICU

## 2020-03-16 NOTE — PROGRESS NOTE ADULT - ATTENDING COMMENTS
Hepatology Staff: Yomi Medina MD    I saw and examined the patient along with  Dr. Motta 03-16-20.    Patient Medical Record, hospital course was reviewed and summarized as below:    Vitals: Vital Signs Last 24 Hrs  T(C): 36.5 (16 Mar 2020 11:00), Max: 38.5 (15 Mar 2020 20:41)  T(F): 97.7 (16 Mar 2020 11:00), Max: 101.3 (15 Mar 2020 20:41)  HR: 89 (16 Mar 2020 13:30) (89 - 148)  BP: 123/57 (15 Mar 2020 22:15) (72/42 - 123/57)  BP(mean): 82 (15 Mar 2020 22:15) (65 - 82)  RR: 8 (16 Mar 2020 13:30) (4 - 23)  SpO2: 98% (16 Mar 2020 13:30) (93% - 100%)    Antibiotics:meropenem  IVPB 1000 milliGRAM(s) IV Intermittent every 8 hours  rifAXIMin 550 milliGRAM(s) Oral two times a day    Labs:Creatinine, Serum: 1.13 mg/dL (03-16-20 @ 13:03)  INR: 2.34 ratio (03-16-20 @ 13:03)  Creatinine, Serum: 1.31 mg/dL (03-16-20 @ 08:52)  INR: 2.12 ratio (03-16-20 @ 08:52)  Creatinine, Serum: 1.42 mg/dL (03-16-20 @ 04:43)  INR: 1.86 ratio (03-16-20 @ 04:43)  Creatinine, Serum: 1.53 mg/dL (03-16-20 @ 01:03)  INR: 1.50 ratio (03-16-20 @ 01:03)  Creatinine, Serum: 1.53 mg/dL (03-15-20 @ 22:40)  Bilirubin Total, Serum: 6.8 mg/dL (03-15-20 @ 22:40)  INR: 2.00 ratio (03-15-20 @ 22:40)  Creatinine, Serum: 1.56 mg/dL (03-15-20 @ 21:34)  Bilirubin Total, Serum: 8.4 mg/dL (03-15-20 @ 21:34)    MELD Na Score: 26    I/O: I&O's Summary    15 Mar 2020 07:01  -  16 Mar 2020 07:00  --------------------------------------------------------  IN: 4607.1 mL / OUT: 1200 mL / NET: 3407.1 mL    16 Mar 2020 07:01  -  16 Mar 2020 13:44  --------------------------------------------------------  IN: 917.2 mL / OUT: 865 mL / NET: 52.2 mL      Nutritional Status:   Albumin, Serum: 3.1 g/dL (03-15-20 @ 22:40)  Albumin, Serum: 3.3 g/dL (03-15-20 @ 21:34)    Last 24 hour events: Patient on low dose IV Pressor, doing better since I & D of the prostatic abscess. WBC count trending down.     Recommendations: Continue with current IV antibiotics, and supportive care. Keep wait-listed for LT on the [facility] list.    Plan discussed with Primary team.

## 2020-03-16 NOTE — DIETITIAN INITIAL EVALUATION ADULT. - PROBLEM SELECTOR PLAN 7
h/O Orthostatic hypotension and Midrodrine was increased during last last admission to 30 mg oral THree times a day   WIll cont Midodrine 30 mg oral 3times /day   Will get Orthostatic BP once able   will repeat serum cortisol as last cortisol in 12/3/19 was 6.6   Support stocking / will cont DASH diet for now

## 2020-03-16 NOTE — PROGRESS NOTE ADULT - SUBJECTIVE AND OBJECTIVE BOX
UROLOGY DAILY PROGRESS NOTE:     Subjective: Patient seen and examined at bedside. Required  massive transfusion protocol postoperatively.  Norepinephrine stopped and restarted       Objective:  Vital signs  T(F): , Max: 101.3 (03-15-20 @ 20:41)  HR: 111 (20 @ 07:00)  BP: 123/57 (03-15-20 @ 22:15)  SpO2: 97% (20 @ 07:00)  Wt(kg): --    Output     I&O's Detail    15 Mar 2020 07:01  -  16 Mar 2020 07:00  --------------------------------------------------------  IN:    dextrose 5%: 875 mL    IV PiggyBack: 850 mL    multiple electrolytes Injection Type 1multiple electrolytes Injection Type 1: 125 mL    norepinephrine Infusion: 207.1 mL    Packed Red Blood Cells: 1200 mL    Plasma: 1000 mL    Solution: 350 mL  Total IN: 4607.1 mL    OUT:    Indwelling Catheter - Urethral: 1200 mL  Total OUT: 1200 mL    Total NET: 3407.1 mL          Physical Exam:  Gen: NAD  Abd: soft NTND  Back: no CVAT  : bob irrigated mild amount of clot   cherry colored urine     Labs:    33.08 / 26.4  /1.42     31.36 / 27.3  /1.53                           8.6    33.08 )-----------( 69       ( 16 Mar 2020 04:43 )             26.4         134<L>  |  96  |  28<H>  ----------------------------<  272<H>  4.9   |  14<L>  |  1.42<H>    Ca    10.4      16 Mar 2020 04:43  Phos  3.9     -  Mg     2.0         TPro  5.2<L>  /  Alb  3.1<L>  /  TBili  6.8<H>  /  DBili  1.5<H>  /  AST  31  /  ALT  16  /  AlkPhos  91  03-15  LABS/RADIOLOGY RESULTS:                          8.6    33.08 )-----------( 69       ( 16 Mar 2020 04:43 )             26.4   03-16    134<L>  |  96  |  28<H>  ----------------------------<  272<H>  4.9   |  14<L>  |  1.42<H>    Ca    10.4      16 Mar 2020 04:43  Phos  3.9     03-16  Mg     2.0     03-16    TPro  5.2<L>  /  Alb  3.1<L>  /  TBili  6.8<H>  /  DBili  1.5<H>  /  AST  31  /  ALT  16  /  AlkPhos  91  03-15  PT/INR - ( 16 Mar 2020 04:43 )   PT: 21.6 sec;   INR: 1.86 ratio         PTT - ( 16 Mar 2020 04:43 )  PTT:47.4 secBlood Cultures    Urinalysis Basic - ( 10 Mar 2020 13:43 )    Color: Yellow / Appearance: Slightly Turbid / S.015 / pH:   Gluc:  / Ketone: Negative  / Bili: Negative / Urobili: 3 mg/dL   Blood:  / Protein: Trace / Nitrite: Negative   Leuk Esterase: Large / RBC: 5 /hpf / WBC 35 /HPF   Sq Epi:  / Non Sq Epi:  / Bacteria: Many      PT/INR - ( 16 Mar 2020 04:43 )   PT: 21.6 sec;   INR: 1.86 ratio         PTT - ( 16 Mar 2020 04:43 )  PTT:47.4 sec          Urine Cx:

## 2020-03-16 NOTE — DIETITIAN INITIAL EVALUATION ADULT. - ADD RECOMMEND
Encouraged pt to continue to adhere to therapeutic diet as he was PTA. Pt aware RD remains available as needed for nutrition interventions and education as needed. 1. Encouraged pt to continue to adhere to therapeutic diet as he was PTA. Pt aware RD remains available as needed for nutrition interventions and education as needed. 2. Consider oral nutritional supplement as needed if/when pt resumes PO diet.

## 2020-03-16 NOTE — PROGRESS NOTE ADULT - SUBJECTIVE AND OBJECTIVE BOX
Endocrine follow up for uncontrolled type 2 diabetes with hyperglycemia    MEDICATIONS  (STANDING):  chlorhexidine 2% Cloths 1 Application(s) Topical <User Schedule>  dextrose 5% 1000 milliLiter(s) (125 mL/Hr) IV Continuous <Continuous>  insulin glargine Injectable (LANTUS) 15 Unit(s) SubCutaneous at bedtime  insulin regular Infusion 3 Unit(s)/Hr (3 mL/Hr) IV Continuous <Continuous>  meropenem  IVPB 1000 milliGRAM(s) IV Intermittent every 8 hours  norepinephrine Infusion 0.09 MICROgram(s)/kG/Min (19.4 mL/Hr) IV Continuous <Continuous>  pantoprazole  Injectable 40 milliGRAM(s) IV Push daily    MEDICATIONS  (PRN):  Allergies  codeine (Anaphylaxis)    Review of Systems:  Constitutional: No fever  HEENT: No pain  Cardiovascular: No chest pain, palpitations  Respiratory: No SOB, no cough  GI: No nausea, vomiting, abdominal pain  : No dysuria  Skin: no rash  UNABLE TO OBTAIN as patient is sedated    PHYSICAL EXAM:  VITALS: T(C): 36.5 (03-16-20 @ 11:00)  T(F): 97.7 (03-16-20 @ 11:00), Max: 101.3 (03-15-20 @ 20:41)  HR: 101 (03-16-20 @ 11:15) (68 - 148)  BP: 123/57 (03-15-20 @ 22:15) (72/42 - 123/57)  RR:  (4 - 23)  SpO2:  (93% - 100%)  Wt(kg): --  GENERAL: NAD  HEENT:  Atraumatic, Normocephalic, moist mucous membranes  RESPIRATORY: Respirations are even and unlabored  CARDIOVASCULAR: tachycardic  GI: cirrhotic  SKIN: Dry, intact, No rashes or lesions  NEURO: unable  PSYCH: unable    POCT Blood Glucose.: 334 mg/dL (03-16-20 @ 10:28)  POCT Blood Glucose.: 106 mg/dL (03-15-20 @ 20:50)  POCT Blood Glucose.: 122 mg/dL (03-15-20 @ 17:37)  POCT Blood Glucose.: 182 mg/dL (03-15-20 @ 08:32)  POCT Blood Glucose.: 274 mg/dL (03-14-20 @ 21:41)  POCT Blood Glucose.: 247 mg/dL (03-14-20 @ 17:25)  POCT Blood Glucose.: 234 mg/dL (03-14-20 @ 12:38)  POCT Blood Glucose.: 261 mg/dL (03-14-20 @ 08:30)  POCT Blood Glucose.: 263 mg/dL (03-13-20 @ 21:39)  POCT Blood Glucose.: 337 mg/dL (03-13-20 @ 17:28)  POCT Blood Glucose.: 253 mg/dL (03-13-20 @ 12:30)      03-16    134<L>  |  96  |  28<H>  ----------------------------<  324<H>  4.5   |  18<L>  |  1.31<H>    EGFR if : 66  EGFR if non : 57<L>    Ca    10.2      03-16  Mg     1.9     03-16  Phos  4.0     03-16    TPro  5.2<L>  /  Alb  3.1<L>  /  TBili  6.8<H>  /  DBili  1.5<H>  /  AST  31  /  ALT  16  /  AlkPhos  91  03-15    Thyroid Function Tests:  03-11 @ 12:37 TSH 0.97 FreeT4 -- T3 -- Anti TPO -- Anti Thyroglobulin Ab -- TSI --    Hemoglobin A1C, Whole Blood: 6.4 % <H> [4.0 - 5.6] (02-12-20 @ 17:08)

## 2020-03-16 NOTE — PROGRESS NOTE ADULT - SUBJECTIVE AND OBJECTIVE BOX
INFECTIOUS DISEASE PROGRESS NOTE  Subjective: Patient does not know if he has pain. Confused, asking what time of day it is.     VITALS  Vital Signs Last 24 Hrs  T(C): 36.5 (16 Mar 2020 11:00), Max: 38.5 (15 Mar 2020 20:41)  T(F): 97.7 (16 Mar 2020 11:00), Max: 101.3 (15 Mar 2020 20:41)  HR: 90 (16 Mar 2020 12:45) (90 - 148)  BP: 123/57 (15 Mar 2020 22:15) (72/42 - 123/57)  BP(mean): 82 (15 Mar 2020 22:15) (65 - 82)  RR: 11 (16 Mar 2020 12:45) (4 - 23)  SpO2: 99% (16 Mar 2020 12:45) (93% - 100%)    I&O's Summary    15 Mar 2020 07:01  -  16 Mar 2020 07:00  --------------------------------------------------------  IN: 4607.1 mL / OUT: 1200 mL / NET: 3407.1 mL    16 Mar 2020 07:01  -  16 Mar 2020 13:10  --------------------------------------------------------  IN: 917.2 mL / OUT: 865 mL / NET: 52.2 mL        CAPILLARY BLOOD GLUCOSE  POCT Blood Glucose.: 292 mg/dL (16 Mar 2020 12:48)  POCT Blood Glucose.: 317 mg/dL (16 Mar 2020 12:02)  POCT Blood Glucose.: 334 mg/dL (16 Mar 2020 10:28)  POCT Blood Glucose.: 106 mg/dL (15 Mar 2020 20:50)  POCT Blood Glucose.: 122 mg/dL (15 Mar 2020 17:37)      PHYSICAL EXAM  Constitutional: Well appearing man lying comfortably in bed, jaundiced  Eyes: scleral icterus  Neck: supple; no JVD or LAD  Respiratory: CTA B/L  Cardiovascular: +S1/S2, RRR; no appreciable murmurs  Gastrointestinal: soft, NT/ND;  Extremities: WWP; no clubbing, cyanosis, or edema  Dermatologic: small scrape on lateral right knee without drainage or surrounding erythema  Neurologic: delirious, moves all extremities   : bob with bright red blood     MEDICATIONS  (STANDING):  chlorhexidine 2% Cloths 1 Application(s) Topical <User Schedule>  dextrose 5% 1000 milliLiter(s) (125 mL/Hr) IV Continuous <Continuous>  insulin glargine Injectable (LANTUS) 15 Unit(s) SubCutaneous at bedtime  insulin regular Infusion 3 Unit(s)/Hr (3 mL/Hr) IV Continuous <Continuous>  lactulose Syrup 30 Gram(s) Oral three times a day  meropenem  IVPB 1000 milliGRAM(s) IV Intermittent every 8 hours  norepinephrine Infusion 0.09 MICROgram(s)/kG/Min (19.4 mL/Hr) IV Continuous <Continuous>  pantoprazole  Injectable 40 milliGRAM(s) IV Push daily  rifAXIMin 550 milliGRAM(s) Oral two times a day    MEDICATIONS  (PRN):      LABS                        8.1    27.03 )-----------( 69       ( 16 Mar 2020 08:52 )             24.6     03-16    134<L>  |  96  |  28<H>  ----------------------------<  324<H>  4.5   |  18<L>  |  1.31<H>    Ca    10.2      16 Mar 2020 08:52  Phos  4.0     03-16  Mg     1.9     03-16    TPro  5.2<L>  /  Alb  3.1<L>  /  TBili  6.8<H>  /  DBili  1.5<H>  /  AST  31  /  ALT  16  /  AlkPhos  91  03-15    LIVER FUNCTIONS - ( 15 Mar 2020 22:40 )  Alb: 3.1 g/dL / Pro: 5.2 g/dL / ALK PHOS: 91 U/L / ALT: 16 U/L / AST: 31 U/L / GGT: x           PT/INR - ( 16 Mar 2020 08:52 )   PT: 24.9 sec;   INR: 2.12 ratio         PTT - ( 16 Mar 2020 08:52 )  PTT:48.1 sec    CARDIAC MARKERS ( 15 Mar 2020 22:40 )  x     / x     / 352 U/L / x     / 2.3 ng/mL      Culture - Urine (collected 03-10-20 @ 17:23)  Source: .Urine Clean Catch (Midstream)  Final Report (03-12-20 @ 17:59):    >100,000 CFU/ml Escherichia coli ESBL  Organism: Escherichia coli ESBL (03-12-20 @ 17:59)  Organism: Escherichia coli ESBL (03-12-20 @ 17:59)      -  Amikacin: S <=16      -  Ampicillin: R >16 These ampicillin results predict results for amoxicillin      -  Ampicillin/Sulbactam: R 16/8 Enterobacter, Citrobacter, and Serratia may develop resistance during prolonged therapy (3-4 days)      -  Aztreonam: R >16      -  Cefazolin: R >16 ([id number]) For uncomplicated UTI with K. pneumoniae, E. coli, or P. mirablis: LEONARDO <=16 is sensitive and LEONARDO >=32 is resistant. This also predicts results for oral agents cefaclor, cefdinir, cefpodoxime, cefprozil, cefuroxime axetil, cephalexin and locarbef for uncomplicated UTI. Note that some isolates may be susceptible to these agents while testing resistant to cefazolin.      -  Cefepime: R >16      -  Cefoxitin: S <=8      -  Ceftriaxone: R >32 Enterobacter, Citrobacter, and Serratia may develop resistance during prolonged therapy      -  Ciprofloxacin: R >2      -  Ertapenem: S <=1      -  Gentamicin: S <=4      -  Imipenem: S <=1      -  Levofloxacin: R >4      -  Meropenem: S <=1      -  Nitrofurantoin: S <=32 Should not be used to treat pyelonephritis      -  Piperacillin/Tazobactam: R <=16      -  Tigecycline: S <=2      -  Tobramycin: S <=4      -  Trimethoprim/Sulfamethoxazole: S <=2/38      Method Type: LEONARDO    Culture - Blood (collected 03-10-20 @ 16:54)  Source: .Blood Blood-Venous  Final Report (03-15-20 @ 17:01):    No growth at 5 days.    Culture - Blood (collected 03-10-20 @ 16:53)  Source: .Blood Blood-Peripheral  Final Report (03-15-20 @ 17:01):    No growth at 5 days.      < from: Xray Chest 1 View- PORTABLE-Urgent (03.10.20 @ 23:12) >    IMPRESSION:     Clear lungs.    < end of copied text > INFECTIOUS DISEASE PROGRESS NOTE  Subjective: Patient does not know if he has pain. Confused, asking what time of day it is.     VITALS  Vital Signs Last 24 Hrs  T(C): 36.5 (16 Mar 2020 11:00), Max: 38.5 (15 Mar 2020 20:41)  T(F): 97.7 (16 Mar 2020 11:00), Max: 101.3 (15 Mar 2020 20:41)  HR: 90 (16 Mar 2020 12:45) (90 - 148)  BP: 123/57 (15 Mar 2020 22:15) (72/42 - 123/57)  BP(mean): 82 (15 Mar 2020 22:15) (65 - 82)  RR: 11 (16 Mar 2020 12:45) (4 - 23)  SpO2: 99% (16 Mar 2020 12:45) (93% - 100%)    I&O's Summary    15 Mar 2020 07:01  -  16 Mar 2020 07:00  --------------------------------------------------------  IN: 4607.1 mL / OUT: 1200 mL / NET: 3407.1 mL    16 Mar 2020 07:01  -  16 Mar 2020 13:10  --------------------------------------------------------  IN: 917.2 mL / OUT: 865 mL / NET: 52.2 mL        CAPILLARY BLOOD GLUCOSE  POCT Blood Glucose.: 292 mg/dL (16 Mar 2020 12:48)  POCT Blood Glucose.: 317 mg/dL (16 Mar 2020 12:02)  POCT Blood Glucose.: 334 mg/dL (16 Mar 2020 10:28)  POCT Blood Glucose.: 106 mg/dL (15 Mar 2020 20:50)  POCT Blood Glucose.: 122 mg/dL (15 Mar 2020 17:37)      PHYSICAL EXAM  Constitutional: Well appearing man lying comfortably in bed, jaundiced  Eyes: scleral icterus  Neck: supple; no JVD or LAD  Respiratory: CTA B/L  Cardiovascular: +S1/S2, RRR; no appreciable murmurs  Gastrointestinal: soft, NT/ND;  Extremities: WWP; no clubbing, cyanosis, or edema  Dermatologic: small scrape on lateral right knee without drainage or surrounding erythema  Neurologic: delirious, moves all extremities   : bob with bright red blood     MEDICATIONS  (STANDING):  chlorhexidine 2% Cloths 1 Application(s) Topical <User Schedule>  dextrose 5% 1000 milliLiter(s) (125 mL/Hr) IV Continuous <Continuous>  insulin glargine Injectable (LANTUS) 15 Unit(s) SubCutaneous at bedtime  insulin regular Infusion 3 Unit(s)/Hr (3 mL/Hr) IV Continuous <Continuous>  lactulose Syrup 30 Gram(s) Oral three times a day  meropenem  IVPB 1000 milliGRAM(s) IV Intermittent every 8 hours  norepinephrine Infusion 0.09 MICROgram(s)/kG/Min (19.4 mL/Hr) IV Continuous <Continuous>  pantoprazole  Injectable 40 milliGRAM(s) IV Push daily  rifAXIMin 550 milliGRAM(s) Oral two times a day    MEDICATIONS  (PRN):      LABS                        8.1    27.03 )-----------( 69       ( 16 Mar 2020 08:52 )             24.6     03-16    134<L>  |  96  |  28<H>  ----------------------------<  324<H>  4.5   |  18<L>  |  1.31<H>    Ca    10.2      16 Mar 2020 08:52  Phos  4.0     03-16  Mg     1.9     03-16    TPro  5.2<L>  /  Alb  3.1<L>  /  TBili  6.8<H>  /  DBili  1.5<H>  /  AST  31  /  ALT  16  /  AlkPhos  91  03-15    LIVER FUNCTIONS - ( 15 Mar 2020 22:40 )  Alb: 3.1 g/dL / Pro: 5.2 g/dL / ALK PHOS: 91 U/L / ALT: 16 U/L / AST: 31 U/L / GGT: x           PT/INR - ( 16 Mar 2020 08:52 )   PT: 24.9 sec;   INR: 2.12 ratio         PTT - ( 16 Mar 2020 08:52 )  PTT:48.1 sec    CARDIAC MARKERS ( 15 Mar 2020 22:40 )  x     / x     / 352 U/L / x     / 2.3 ng/mL      Culture - Urine (collected 03-10-20 @ 17:23)  Source: .Urine Clean Catch (Midstream)  Final Report (03-12-20 @ 17:59):    >100,000 CFU/ml Escherichia coli ESBL  Organism: Escherichia coli ESBL (03-12-20 @ 17:59)  Organism: Escherichia coli ESBL (03-12-20 @ 17:59)      -  Amikacin: S <=16      -  Ampicillin: R >16 These ampicillin results predict results for amoxicillin      -  Ampicillin/Sulbactam: R 16/8 Enterobacter, Citrobacter, and Serratia may develop resistance during prolonged therapy (3-4 days)      -  Aztreonam: R >16      -  Cefazolin: R >16 ([email or web address]) For uncomplicated UTI with K. pneumoniae, E. coli, or P. mirablis: LEONARDO <=16 is sensitive and LEONARDO >=32 is resistant. This also predicts results for oral agents cefaclor, cefdinir, cefpodoxime, cefprozil, cefuroxime axetil, cephalexin and locarbef for uncomplicated UTI. Note that some isolates may be susceptible to these agents while testing resistant to cefazolin.      -  Cefepime: R >16      -  Cefoxitin: S <=8      -  Ceftriaxone: R >32 Enterobacter, Citrobacter, and Serratia may develop resistance during prolonged therapy      -  Ciprofloxacin: R >2      -  Ertapenem: S <=1      -  Gentamicin: S <=4      -  Imipenem: S <=1      -  Levofloxacin: R >4      -  Meropenem: S <=1      -  Nitrofurantoin: S <=32 Should not be used to treat pyelonephritis      -  Piperacillin/Tazobactam: R <=16      -  Tigecycline: S <=2      -  Tobramycin: S <=4      -  Trimethoprim/Sulfamethoxazole: S <=2/38      Method Type: LEONARDO    Culture - Blood (collected 03-10-20 @ 16:54)  Source: .Blood Blood-Venous  Final Report (03-15-20 @ 17:01):    No growth at 5 days.    Culture - Blood (collected 03-10-20 @ 16:53)  Source: .Blood Blood-Peripheral  Final Report (03-15-20 @ 17:01):    No growth at 5 days.    RADIOLOGY:    <The imaging below has been reviewed and visualized by me independently. Findings as detailed in report below>    < from: Xray Chest 1 View- PORTABLE-Urgent (03.10.20 @ 23:12) >    IMPRESSION:     Clear lungs.    < end of copied text >

## 2020-03-16 NOTE — PROGRESS NOTE ADULT - SUBJECTIVE AND OBJECTIVE BOX
Chief Complaint:  Patient is a 64y old  Male who presents with a chief complaint of Syncopal episode at home at 0430 this am, LOC for approx 3 minutes per wife, pt had episode of shaking during that time. Hx of multiple falls. Pt reporting abdominal pain for X 1day, no nausea or vomiting, endorses loose stool on lactulose at home (16 Mar 2020 08:26)      Interval Events:   s/p syncopal episode two nights ago  Fever of 101 last night    Allergies:  codeine (Anaphylaxis)      Home Medications:    Hospital Medications:  chlorhexidine 2% Cloths 1 Application(s) Topical <User Schedule>  dextrose 5% 1000 milliLiter(s) IV Continuous <Continuous>  insulin glargine Injectable (LANTUS) 15 Unit(s) SubCutaneous at bedtime  insulin lispro (HumaLOG) corrective regimen sliding scale   SubCutaneous every 4 hours  meropenem  IVPB 1000 milliGRAM(s) IV Intermittent every 8 hours  norepinephrine Infusion 0.09 MICROgram(s)/kG/Min IV Continuous <Continuous>  pantoprazole  Injectable 40 milliGRAM(s) IV Push daily      PMHX/PSHX:  GIB (gastrointestinal bleeding)  GERD with esophagitis  Hepatic encephalopathy  Obesity  Fatty liver disease, nonalcoholic  Renal stones  Hypertension  Neuropathy  Hypercholesteremia  Diabetes  S/P cholecystectomy  No significant past surgical history      Family history:  Family history of type 2 diabetes mellitus  Family history of hypertension  Family history of stomach cancer  No pertinent family history in first degree relatives      ROS:     General:  No wt loss, fevers, chills, night sweats, fatigue,   Eyes:  Good vision, no reported pain  ENT:  No sore throat, pain, runny nose, dysphagia  CV:  No pain, palpitations, hypo/hypertension  Resp:  No dyspnea, cough, tachypnea, wheezing  GI:  No pain, No nausea, No vomiting, No diarrhea, No constipation, No weight loss, No fever, No pruritis, No rectal bleeding, No tarry stools, No dysphagia,  :  No pain, bleeding, incontinence, nocturia  Muscle:  No pain, weakness  Neuro:  No weakness, tingling, memory problems  Psych:  No fatigue, insomnia, mood problems, depression  Endocrine:  No polyuria, polydipsia, cold/heat intolerance  Heme:  No petechiae, ecchymosis, easy bruisability  Skin:  No rash, tattoos, scars, edema      PHYSICAL EXAM:   Vital Signs:  Vital Signs Last 24 Hrs  T(C): 37 (16 Mar 2020 03:00), Max: 38.5 (15 Mar 2020 20:41)  T(F): 98.6 (16 Mar 2020 03:00), Max: 101.3 (15 Mar 2020 20:41)  HR: 111 (16 Mar 2020 07:00) (68 - 148)  BP: 123/57 (15 Mar 2020 22:15) (72/42 - 123/57)  BP(mean): 82 (15 Mar 2020 22:15) (65 - 84)  RR: 13 (16 Mar 2020 07:00) (4 - 23)  SpO2: 97% (16 Mar 2020 07:00) (93% - 100%)  Daily Height in cm: 185.42 (15 Mar 2020 10:50)    Daily Weight in k.8 (16 Mar 2020 00:40)    GENERAL:  Appears stated age, well-groomed, well-nourished, no distress  HEENT:  NC/AT,  +sclera icteric  CHEST:  Full & symmetric excursion, no increased effort, breath sounds clear  HEART:  Regular rhythm, S1, S2, no JVD  ABDOMEN:  Soft, non-tender, non-distended, normoactive bowel sounds  EXTREMITIES:  no cyanosis, clubbing or edema  SKIN:  Jaundiced  NEURO:  Alert, oriented, +faint asterixis    LABS:                        8.6    33.08 )-----------( 69       ( 16 Mar 2020 04:43 )             26.4     03-16    134<L>  |  96  |  28<H>  ----------------------------<  272<H>  4.9   |  14<L>  |  1.42<H>    Ca    10.4      16 Mar 2020 04:43  Phos  3.9     03-16  Mg     2.0     03-16    TPro  5.2<L>  /  Alb  3.1<L>  /  TBili  6.8<H>  /  DBili  1.5<H>  /  AST  31  /  ALT  16  /  AlkPhos  91  03-15    LIVER FUNCTIONS - ( 15 Mar 2020 22:40 )  Alb: 3.1 g/dL / Pro: 5.2 g/dL / ALK PHOS: 91 U/L / ALT: 16 U/L / AST: 31 U/L / GGT: x           PT/INR - ( 16 Mar 2020 04:43 )   PT: 21.6 sec;   INR: 1.86 ratio         PTT - ( 16 Mar 2020 04:43 )  PTT:47.4 sec        Imaging: Chief Complaint:  Patient is a 64y old  Male who presents with a chief complaint of Syncopal episode at home at 0430 this am, LOC for approx 3 minutes per wife, pt had episode of shaking during that time. Hx of multiple falls. Pt reporting abdominal pain for X 1day, no nausea or vomiting, endorses loose stool on lactulose at home (16 Mar 2020 08:26)      Interval Events:   Fever of 101 last night  No complaints this AM. Denies any abd pain, fevers or chills    Allergies:  codeine (Anaphylaxis)      Home Medications:    Hospital Medications:  chlorhexidine 2% Cloths 1 Application(s) Topical <User Schedule>  dextrose 5% 1000 milliLiter(s) IV Continuous <Continuous>  insulin glargine Injectable (LANTUS) 15 Unit(s) SubCutaneous at bedtime  insulin lispro (HumaLOG) corrective regimen sliding scale   SubCutaneous every 4 hours  meropenem  IVPB 1000 milliGRAM(s) IV Intermittent every 8 hours  norepinephrine Infusion 0.09 MICROgram(s)/kG/Min IV Continuous <Continuous>  pantoprazole  Injectable 40 milliGRAM(s) IV Push daily      PMHX/PSHX:  GIB (gastrointestinal bleeding)  GERD with esophagitis  Hepatic encephalopathy  Obesity  Fatty liver disease, nonalcoholic  Renal stones  Hypertension  Neuropathy  Hypercholesteremia  Diabetes  S/P cholecystectomy  No significant past surgical history      Family history:  Family history of type 2 diabetes mellitus  Family history of hypertension  Family history of stomach cancer  No pertinent family history in first degree relatives      ROS:     General:  No wt loss, fevers, chills, night sweats, fatigue,   Eyes:  Good vision, no reported pain  ENT:  No sore throat, pain, runny nose, dysphagia  CV:  No pain, palpitations, hypo/hypertension  Resp:  No dyspnea, cough, tachypnea, wheezing  GI:  No pain, No nausea, No vomiting, No diarrhea, No constipation, No weight loss, No fever, No pruritis, No rectal bleeding, No tarry stools, No dysphagia,  :  No pain, bleeding, incontinence, nocturia  Muscle:  No pain, weakness  Neuro:  No weakness, tingling, memory problems  Psych:  No fatigue, insomnia, mood problems, depression  Endocrine:  No polyuria, polydipsia, cold/heat intolerance  Heme:  No petechiae, ecchymosis, easy bruisability  Skin:  No rash, tattoos, scars, edema      PHYSICAL EXAM:   Vital Signs:  Vital Signs Last 24 Hrs  T(C): 37 (16 Mar 2020 03:00), Max: 38.5 (15 Mar 2020 20:41)  T(F): 98.6 (16 Mar 2020 03:00), Max: 101.3 (15 Mar 2020 20:41)  HR: 111 (16 Mar 2020 07:00) (68 - 148)  BP: 123/57 (15 Mar 2020 22:15) (72/42 - 123/57)  BP(mean): 82 (15 Mar 2020 22:15) (65 - 84)  RR: 13 (16 Mar 2020 07:00) (4 - 23)  SpO2: 97% (16 Mar 2020 07:00) (93% - 100%)  Daily Height in cm: 185.42 (15 Mar 2020 10:50)    Daily Weight in k.8 (16 Mar 2020 00:40)    GENERAL:  Appears stated age, well-groomed, well-nourished, no distress  HEENT:  NC/AT,  +sclera icteric  CHEST:  Full & symmetric excursion, no increased effort, breath sounds clear  HEART:  Regular rhythm, S1, S2, no JVD  ABDOMEN:  Soft, non-tender, non-distended, normoactive bowel sounds  EXTREMITIES:  no cyanosis, clubbing or edema  SKIN:  Jaundiced  NEURO:  Alert, oriented, +faint asterixis    LABS:                        8.6    33.08 )-----------( 69       ( 16 Mar 2020 04:43 )             26.4     03-16    134<L>  |  96  |  28<H>  ----------------------------<  272<H>  4.9   |  14<L>  |  1.42<H>    Ca    10.4      16 Mar 2020 04:43  Phos  3.9     03-16  Mg     2.0     03-16    TPro  5.2<L>  /  Alb  3.1<L>  /  TBili  6.8<H>  /  DBili  1.5<H>  /  AST  31  /  ALT  16  /  AlkPhos  91  03-15    LIVER FUNCTIONS - ( 15 Mar 2020 22:40 )  Alb: 3.1 g/dL / Pro: 5.2 g/dL / ALK PHOS: 91 U/L / ALT: 16 U/L / AST: 31 U/L / GGT: x           PT/INR - ( 16 Mar 2020 04:43 )   PT: 21.6 sec;   INR: 1.86 ratio         PTT - ( 16 Mar 2020 04:43 )  PTT:47.4 sec        Imaging:

## 2020-03-16 NOTE — PROGRESS NOTE ADULT - ASSESSMENT
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 He has had multiple recent hospitalizations, including hospitalization from 2/11/20-2/21/20 due to septic shock secondary to emphysematous cystitis and ESBL E. coli bacteremia and from 2/28/20-3/3/20 after a mechanical fall. He is currently re-admitted after another mechanical fall likely preceded by vasovagal syncope, with evidence of urosepsis as well as mild hepatic encephalopathy and mild MISA.    Impression:  #JEAN Cirrhosis: UNOS listed for liver transplant. MELD-Na 26 on 3/13  Decompensated by esophageal varices, ascites, h/o hepatic encephalopathy and h/o SBP.   -Ascites: Trace on CT abdomen/pelvis from 02/11/20  -Varices: Small non-bleeding EV on EGD from 12/2019.  -HCC: No focal liver lesions on MRI abdomen 12/2019.   -PSE: No asterixis.     #Recurrent urosepsis with  large prostatic abscess.  Emphesematous cystitis seen on during prior hospitalization with hx of ESBL Ecoli. CT scan during this admission showing enlarged prostate concerning for abscess. There is concern for possible colovesicular fistula.  Bcx from 3/10 negative  Ucx from 3/10 showing >100,000 ESBL Ecoli  CT pelvis with IV contrast showing diffusely enlarged prostate with extravasation of contrast concerning for fistula  s/p OR for I&D of prostate abscess on 3/15      #Recurrent falls: Differential includes deconditioning vs. diabetic neuropathy.   Previous work up from previous hospitalization:    -TTE unremarkable this admission, less likely cardiac cause. CT head without acute neurologic findings.   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 He has had multiple recent hospitalizations, including hospitalization from 2/11/20-2/21/20 due to septic shock secondary to emphysematous cystitis and ESBL E. coli bacteremia and from 2/28/20-3/3/20 after a mechanical fall. He is currently re-admitted after another mechanical fall likely preceded by vasovagal syncope, with evidence of urosepsis as well as mild hepatic encephalopathy and mild MISA.    Impression:  #JEAN Cirrhosis: UNOS listed for liver transplant. MELD-Na 26 on 3/15  Decompensated by esophageal varices, ascites, h/o hepatic encephalopathy and h/o SBP.   -Ascites: Trace on CT abdomen/pelvis from 02/11/20  -Varices: Small non-bleeding EV on EGD from 12/2019.  -HCC: No focal liver lesions on MRI abdomen 12/2019.   -PSE: No asterixis.     #Recurrent urosepsis with  large prostatic abscess.  Emphesematous cystitis seen on during prior hospitalization with hx of ESBL Ecoli. CT scan during this admission showing enlarged prostate concerning for abscess. There is concern for possible colovesicular fistula.  Bcx from 3/10 negative  Ucx from 3/10 showing >100,000 ESBL Ecoli  CT pelvis with IV contrast showing diffusely enlarged prostate with extravasation of contrast concerning for fistula  s/p OR for I&D of prostate abscess on 3/15      #Recurrent falls: Differential includes deconditioning vs. diabetic neuropathy.   Previous work up from previous hospitalization:    -TTE unremarkable this admission, less likely cardiac cause. CT head without acute neurologic findings.   -Mental status at baseline on lactulose    Recommendations:  - urology consulted, s/p OR drainaged, f/u recommendations  - cw with IV abx  - ID following, f/u recommendations  - trend CBC and fever curve daily  - Hold diuretics for now  - Continue with midodrine 30mg TID  - Continue Lactulose and Rifaximin

## 2020-03-17 LAB
-  AMIKACIN: SIGNIFICANT CHANGE UP
-  AMOXICILLIN/CLAVULANIC ACID: SIGNIFICANT CHANGE UP
-  AMPICILLIN/SULBACTAM: SIGNIFICANT CHANGE UP
-  AMPICILLIN: SIGNIFICANT CHANGE UP
-  AZTREONAM: SIGNIFICANT CHANGE UP
-  CEFAZOLIN: SIGNIFICANT CHANGE UP
-  CEFEPIME: SIGNIFICANT CHANGE UP
-  CEFOXITIN: SIGNIFICANT CHANGE UP
-  CEFTRIAXONE: SIGNIFICANT CHANGE UP
-  CIPROFLOXACIN: SIGNIFICANT CHANGE UP
-  ERTAPENEM: SIGNIFICANT CHANGE UP
-  GENTAMICIN: SIGNIFICANT CHANGE UP
-  IMIPENEM: SIGNIFICANT CHANGE UP
-  LEVOFLOXACIN: SIGNIFICANT CHANGE UP
-  MEROPENEM: SIGNIFICANT CHANGE UP
-  PIPERACILLIN/TAZOBACTAM: SIGNIFICANT CHANGE UP
-  TOBRAMYCIN: SIGNIFICANT CHANGE UP
-  TRIMETHOPRIM/SULFAMETHOXAZOLE: SIGNIFICANT CHANGE UP
ALBUMIN SERPL ELPH-MCNC: 2.8 G/DL — LOW (ref 3.3–5)
ALBUMIN SERPL ELPH-MCNC: 3 G/DL — LOW (ref 3.3–5)
ALBUMIN SERPL ELPH-MCNC: 3.1 G/DL — LOW (ref 3.3–5)
ALP SERPL-CCNC: 63 U/L — SIGNIFICANT CHANGE UP (ref 40–120)
ALP SERPL-CCNC: 69 U/L — SIGNIFICANT CHANGE UP (ref 40–120)
ALP SERPL-CCNC: 74 U/L — SIGNIFICANT CHANGE UP (ref 40–120)
ALT FLD-CCNC: 41 U/L — SIGNIFICANT CHANGE UP (ref 10–45)
ALT FLD-CCNC: 42 U/L — SIGNIFICANT CHANGE UP (ref 10–45)
ALT FLD-CCNC: 45 U/L — SIGNIFICANT CHANGE UP (ref 10–45)
ANION GAP SERPL CALC-SCNC: 11 MMOL/L — SIGNIFICANT CHANGE UP (ref 5–17)
ANION GAP SERPL CALC-SCNC: 12 MMOL/L — SIGNIFICANT CHANGE UP (ref 5–17)
APTT BLD: 58.2 SEC — HIGH (ref 27.5–36.3)
APTT BLD: 68.3 SEC — HIGH (ref 27.5–36.3)
APTT BLD: 72.7 SEC — HIGH (ref 27.5–36.3)
AST SERPL-CCNC: 78 U/L — HIGH (ref 10–40)
AST SERPL-CCNC: 79 U/L — HIGH (ref 10–40)
AST SERPL-CCNC: 85 U/L — HIGH (ref 10–40)
BILIRUB DIRECT SERPL-MCNC: 1.8 MG/DL — HIGH (ref 0–0.2)
BILIRUB DIRECT SERPL-MCNC: 1.8 MG/DL — HIGH (ref 0–0.2)
BILIRUB DIRECT SERPL-MCNC: 2.1 MG/DL — HIGH (ref 0–0.2)
BILIRUB INDIRECT FLD-MCNC: 6.8 MG/DL — HIGH (ref 0.2–1)
BILIRUB INDIRECT FLD-MCNC: 6.9 MG/DL — HIGH (ref 0.2–1)
BILIRUB INDIRECT FLD-MCNC: 7 MG/DL — HIGH (ref 0.2–1)
BILIRUB SERPL-MCNC: 8.6 MG/DL — HIGH (ref 0.2–1.2)
BILIRUB SERPL-MCNC: 8.7 MG/DL — HIGH (ref 0.2–1.2)
BILIRUB SERPL-MCNC: 9.1 MG/DL — HIGH (ref 0.2–1.2)
BUN SERPL-MCNC: 25 MG/DL — HIGH (ref 7–23)
BUN SERPL-MCNC: 26 MG/DL — HIGH (ref 7–23)
CALCIUM SERPL-MCNC: 9.1 MG/DL — SIGNIFICANT CHANGE UP (ref 8.4–10.5)
CALCIUM SERPL-MCNC: 9.3 MG/DL — SIGNIFICANT CHANGE UP (ref 8.4–10.5)
CALCIUM SERPL-MCNC: 9.3 MG/DL — SIGNIFICANT CHANGE UP (ref 8.4–10.5)
CALCIUM SERPL-MCNC: 9.4 MG/DL — SIGNIFICANT CHANGE UP (ref 8.4–10.5)
CHLORIDE SERPL-SCNC: 97 MMOL/L — SIGNIFICANT CHANGE UP (ref 96–108)
CHLORIDE SERPL-SCNC: 98 MMOL/L — SIGNIFICANT CHANGE UP (ref 96–108)
CHLORIDE SERPL-SCNC: 98 MMOL/L — SIGNIFICANT CHANGE UP (ref 96–108)
CHLORIDE SERPL-SCNC: 99 MMOL/L — SIGNIFICANT CHANGE UP (ref 96–108)
CO2 SERPL-SCNC: 26 MMOL/L — SIGNIFICANT CHANGE UP (ref 22–31)
CO2 SERPL-SCNC: 27 MMOL/L — SIGNIFICANT CHANGE UP (ref 22–31)
CO2 SERPL-SCNC: 28 MMOL/L — SIGNIFICANT CHANGE UP (ref 22–31)
CO2 SERPL-SCNC: 30 MMOL/L — SIGNIFICANT CHANGE UP (ref 22–31)
CREAT SERPL-MCNC: 0.75 MG/DL — SIGNIFICANT CHANGE UP (ref 0.5–1.3)
CREAT SERPL-MCNC: 0.76 MG/DL — SIGNIFICANT CHANGE UP (ref 0.5–1.3)
CREAT SERPL-MCNC: 0.81 MG/DL — SIGNIFICANT CHANGE UP (ref 0.5–1.3)
CREAT SERPL-MCNC: 0.88 MG/DL — SIGNIFICANT CHANGE UP (ref 0.5–1.3)
FIBRINOGEN PPP-MCNC: 144 MG/DL — LOW (ref 350–510)
FIBRINOGEN PPP-MCNC: 157 MG/DL — LOW (ref 350–510)
FIBRINOGEN PPP-MCNC: 165 MG/DL — LOW (ref 350–510)
GAS PNL BLDA: SIGNIFICANT CHANGE UP
GLUCOSE BLDC GLUCOMTR-MCNC: 139 MG/DL — HIGH (ref 70–99)
GLUCOSE BLDC GLUCOMTR-MCNC: 174 MG/DL — HIGH (ref 70–99)
GLUCOSE BLDC GLUCOMTR-MCNC: 212 MG/DL — HIGH (ref 70–99)
GLUCOSE BLDC GLUCOMTR-MCNC: 82 MG/DL — SIGNIFICANT CHANGE UP (ref 70–99)
GLUCOSE BLDC GLUCOMTR-MCNC: 83 MG/DL — SIGNIFICANT CHANGE UP (ref 70–99)
GLUCOSE BLDC GLUCOMTR-MCNC: 84 MG/DL — SIGNIFICANT CHANGE UP (ref 70–99)
GLUCOSE BLDC GLUCOMTR-MCNC: 95 MG/DL — SIGNIFICANT CHANGE UP (ref 70–99)
GLUCOSE SERPL-MCNC: 170 MG/DL — HIGH (ref 70–99)
GLUCOSE SERPL-MCNC: 179 MG/DL — HIGH (ref 70–99)
GLUCOSE SERPL-MCNC: 79 MG/DL — SIGNIFICANT CHANGE UP (ref 70–99)
GLUCOSE SERPL-MCNC: 85 MG/DL — SIGNIFICANT CHANGE UP (ref 70–99)
HCT VFR BLD CALC: 21 % — CRITICAL LOW (ref 39–50)
HCT VFR BLD CALC: 21.7 % — LOW (ref 39–50)
HCT VFR BLD CALC: 22.5 % — LOW (ref 39–50)
HGB BLD-MCNC: 7.1 G/DL — LOW (ref 13–17)
HGB BLD-MCNC: 7.4 G/DL — LOW (ref 13–17)
HGB BLD-MCNC: 7.7 G/DL — LOW (ref 13–17)
INR BLD: 2.37 RATIO — HIGH (ref 0.88–1.16)
INR BLD: 2.53 RATIO — HIGH (ref 0.88–1.16)
INR BLD: 2.55 RATIO — HIGH (ref 0.88–1.16)
MAGNESIUM SERPL-MCNC: 1.9 MG/DL — SIGNIFICANT CHANGE UP (ref 1.6–2.6)
MAGNESIUM SERPL-MCNC: 2.1 MG/DL — SIGNIFICANT CHANGE UP (ref 1.6–2.6)
MAGNESIUM SERPL-MCNC: 2.1 MG/DL — SIGNIFICANT CHANGE UP (ref 1.6–2.6)
MCHC RBC-ENTMCNC: 32.3 PG — SIGNIFICANT CHANGE UP (ref 27–34)
MCHC RBC-ENTMCNC: 32.4 PG — SIGNIFICANT CHANGE UP (ref 27–34)
MCHC RBC-ENTMCNC: 32.6 PG — SIGNIFICANT CHANGE UP (ref 27–34)
MCHC RBC-ENTMCNC: 33.8 GM/DL — SIGNIFICANT CHANGE UP (ref 32–36)
MCHC RBC-ENTMCNC: 34.1 GM/DL — SIGNIFICANT CHANGE UP (ref 32–36)
MCHC RBC-ENTMCNC: 34.2 GM/DL — SIGNIFICANT CHANGE UP (ref 32–36)
MCV RBC AUTO: 94.8 FL — SIGNIFICANT CHANGE UP (ref 80–100)
MCV RBC AUTO: 95.3 FL — SIGNIFICANT CHANGE UP (ref 80–100)
MCV RBC AUTO: 95.9 FL — SIGNIFICANT CHANGE UP (ref 80–100)
METHOD TYPE: SIGNIFICANT CHANGE UP
NRBC # BLD: 0 /100 WBCS — SIGNIFICANT CHANGE UP (ref 0–0)
PHOSPHATE SERPL-MCNC: 2.1 MG/DL — LOW (ref 2.5–4.5)
PHOSPHATE SERPL-MCNC: 3.3 MG/DL — SIGNIFICANT CHANGE UP (ref 2.5–4.5)
PHOSPHATE SERPL-MCNC: 3.3 MG/DL — SIGNIFICANT CHANGE UP (ref 2.5–4.5)
PLATELET # BLD AUTO: 48 K/UL — LOW (ref 150–400)
PLATELET # BLD AUTO: 50 K/UL — LOW (ref 150–400)
PLATELET # BLD AUTO: 52 K/UL — LOW (ref 150–400)
POTASSIUM SERPL-MCNC: 3.6 MMOL/L — SIGNIFICANT CHANGE UP (ref 3.5–5.3)
POTASSIUM SERPL-MCNC: 4.4 MMOL/L — SIGNIFICANT CHANGE UP (ref 3.5–5.3)
POTASSIUM SERPL-MCNC: 5.3 MMOL/L — SIGNIFICANT CHANGE UP (ref 3.5–5.3)
POTASSIUM SERPL-MCNC: 5.4 MMOL/L — HIGH (ref 3.5–5.3)
POTASSIUM SERPL-SCNC: 3.6 MMOL/L — SIGNIFICANT CHANGE UP (ref 3.5–5.3)
POTASSIUM SERPL-SCNC: 4.4 MMOL/L — SIGNIFICANT CHANGE UP (ref 3.5–5.3)
POTASSIUM SERPL-SCNC: 5.3 MMOL/L — SIGNIFICANT CHANGE UP (ref 3.5–5.3)
POTASSIUM SERPL-SCNC: 5.4 MMOL/L — HIGH (ref 3.5–5.3)
PROT SERPL-MCNC: 4.6 G/DL — LOW (ref 6–8.3)
PROT SERPL-MCNC: 4.9 G/DL — LOW (ref 6–8.3)
PROT SERPL-MCNC: 5 G/DL — LOW (ref 6–8.3)
PROTHROM AB SERPL-ACNC: 27.8 SEC — HIGH (ref 10–12.9)
PROTHROM AB SERPL-ACNC: 30 SEC — HIGH (ref 10–12.9)
PROTHROM AB SERPL-ACNC: 30.2 SEC — HIGH (ref 10–12.9)
RBC # BLD: 2.19 M/UL — LOW (ref 4.2–5.8)
RBC # BLD: 2.29 M/UL — LOW (ref 4.2–5.8)
RBC # BLD: 2.36 M/UL — LOW (ref 4.2–5.8)
RBC # FLD: 21.1 % — HIGH (ref 10.3–14.5)
RBC # FLD: 21.2 % — HIGH (ref 10.3–14.5)
RBC # FLD: 21.6 % — HIGH (ref 10.3–14.5)
SODIUM SERPL-SCNC: 136 MMOL/L — SIGNIFICANT CHANGE UP (ref 135–145)
SODIUM SERPL-SCNC: 137 MMOL/L — SIGNIFICANT CHANGE UP (ref 135–145)
SODIUM SERPL-SCNC: 138 MMOL/L — SIGNIFICANT CHANGE UP (ref 135–145)
SODIUM SERPL-SCNC: 139 MMOL/L — SIGNIFICANT CHANGE UP (ref 135–145)
WBC # BLD: 12.05 K/UL — HIGH (ref 3.8–10.5)
WBC # BLD: 16.53 K/UL — HIGH (ref 3.8–10.5)
WBC # BLD: 19.89 K/UL — HIGH (ref 3.8–10.5)
WBC # FLD AUTO: 12.05 K/UL — HIGH (ref 3.8–10.5)
WBC # FLD AUTO: 16.53 K/UL — HIGH (ref 3.8–10.5)
WBC # FLD AUTO: 19.89 K/UL — HIGH (ref 3.8–10.5)

## 2020-03-17 PROCEDURE — 99232 SBSQ HOSP IP/OBS MODERATE 35: CPT

## 2020-03-17 PROCEDURE — 99291 CRITICAL CARE FIRST HOUR: CPT

## 2020-03-17 RX ORDER — MAGNESIUM SULFATE 500 MG/ML
2 VIAL (ML) INJECTION ONCE
Refills: 0 | Status: COMPLETED | OUTPATIENT
Start: 2020-03-17 | End: 2020-03-17

## 2020-03-17 RX ORDER — SODIUM CHLORIDE 9 MG/ML
1000 INJECTION, SOLUTION INTRAVENOUS
Refills: 0 | Status: DISCONTINUED | OUTPATIENT
Start: 2020-03-17 | End: 2020-03-17

## 2020-03-17 RX ORDER — MAGNESIUM SULFATE 500 MG/ML
2 VIAL (ML) INJECTION ONCE
Refills: 0 | Status: DISCONTINUED | OUTPATIENT
Start: 2020-03-17 | End: 2020-03-17

## 2020-03-17 RX ORDER — ERTAPENEM SODIUM 1 G/1
1 INJECTION, POWDER, LYOPHILIZED, FOR SOLUTION INTRAMUSCULAR; INTRAVENOUS EVERY 24 HOURS
Refills: 0 | Status: DISCONTINUED | OUTPATIENT
Start: 2020-03-17 | End: 2020-03-17

## 2020-03-17 RX ORDER — ERTAPENEM SODIUM 1 G/1
1000 INJECTION, POWDER, LYOPHILIZED, FOR SOLUTION INTRAMUSCULAR; INTRAVENOUS EVERY 24 HOURS
Refills: 0 | Status: COMPLETED | OUTPATIENT
Start: 2020-03-17 | End: 2020-03-23

## 2020-03-17 RX ORDER — POTASSIUM CHLORIDE 20 MEQ
20 PACKET (EA) ORAL
Refills: 0 | Status: COMPLETED | OUTPATIENT
Start: 2020-03-17 | End: 2020-03-17

## 2020-03-17 RX ORDER — INSULIN LISPRO 100/ML
VIAL (ML) SUBCUTANEOUS
Refills: 0 | Status: DISCONTINUED | OUTPATIENT
Start: 2020-03-17 | End: 2020-03-20

## 2020-03-17 RX ORDER — INSULIN GLARGINE 100 [IU]/ML
15 INJECTION, SOLUTION SUBCUTANEOUS AT BEDTIME
Refills: 0 | Status: DISCONTINUED | OUTPATIENT
Start: 2020-03-17 | End: 2020-03-21

## 2020-03-17 RX ORDER — MIDODRINE HYDROCHLORIDE 2.5 MG/1
30 TABLET ORAL EVERY 8 HOURS
Refills: 0 | Status: DISCONTINUED | OUTPATIENT
Start: 2020-03-17 | End: 2020-03-24

## 2020-03-17 RX ORDER — INSULIN LISPRO 100/ML
VIAL (ML) SUBCUTANEOUS AT BEDTIME
Refills: 0 | Status: DISCONTINUED | OUTPATIENT
Start: 2020-03-17 | End: 2020-03-20

## 2020-03-17 RX ADMIN — Medication 20 MILLIEQUIVALENT(S): at 05:19

## 2020-03-17 RX ADMIN — Medication 20 MILLIEQUIVALENT(S): at 02:23

## 2020-03-17 RX ADMIN — SODIUM CHLORIDE 125 MILLILITER(S): 9 INJECTION, SOLUTION INTRAVENOUS at 00:26

## 2020-03-17 RX ADMIN — Medication 50 GRAM(S): at 06:39

## 2020-03-17 RX ADMIN — MIDODRINE HYDROCHLORIDE 30 MILLIGRAM(S): 2.5 TABLET ORAL at 22:34

## 2020-03-17 RX ADMIN — ERTAPENEM SODIUM 120 MILLIGRAM(S): 1 INJECTION, POWDER, LYOPHILIZED, FOR SOLUTION INTRAMUSCULAR; INTRAVENOUS at 15:50

## 2020-03-17 RX ADMIN — LACTULOSE 30 GRAM(S): 10 SOLUTION ORAL at 15:51

## 2020-03-17 RX ADMIN — TAMSULOSIN HYDROCHLORIDE 0.4 MILLIGRAM(S): 0.4 CAPSULE ORAL at 22:34

## 2020-03-17 RX ADMIN — Medication 1 MILLIGRAM(S): at 12:31

## 2020-03-17 RX ADMIN — PANTOPRAZOLE SODIUM 40 MILLIGRAM(S): 20 TABLET, DELAYED RELEASE ORAL at 08:34

## 2020-03-17 RX ADMIN — Medication 125 MILLILITER(S): at 02:23

## 2020-03-17 RX ADMIN — CHLORHEXIDINE GLUCONATE 1 APPLICATION(S): 213 SOLUTION TOPICAL at 06:39

## 2020-03-17 RX ADMIN — INSULIN GLARGINE 15 UNIT(S): 100 INJECTION, SOLUTION SUBCUTANEOUS at 22:33

## 2020-03-17 RX ADMIN — LACTULOSE 30 GRAM(S): 10 SOLUTION ORAL at 05:18

## 2020-03-17 RX ADMIN — LACTULOSE 30 GRAM(S): 10 SOLUTION ORAL at 22:34

## 2020-03-17 RX ADMIN — Medication 2: at 16:41

## 2020-03-17 RX ADMIN — Medication 125 MILLILITER(S): at 08:38

## 2020-03-17 RX ADMIN — MIDODRINE HYDROCHLORIDE 30 MILLIGRAM(S): 2.5 TABLET ORAL at 15:50

## 2020-03-17 RX ADMIN — MEROPENEM 100 MILLIGRAM(S): 1 INJECTION INTRAVENOUS at 05:18

## 2020-03-17 NOTE — PROGRESS NOTE ADULT - PROBLEM SELECTOR PLAN 2
patient had low cortisol levels  -he has multiple acute medical issues requiring ICU care  -corticosteroid binding globulin pending 3/15

## 2020-03-17 NOTE — PROGRESS NOTE ADULT - SUBJECTIVE AND OBJECTIVE BOX
Follow Up:  Prostate Abscess    Interval History: afebrile. off vasopressors. denies pain.     REVIEW OF SYSTEMS  [  ] ROS unobtainable because:    [x  ] All other systems negative except as noted below    Constitutional:  [ ] fever [ ] chills  [ ] weight loss  [ ] weakness  Skin:  [ ] rash [ ] phlebitis	  Eyes: [ ] icterus [ ] pain  [ ] discharge	  ENMT: [ ] sore throat  [ ] thrush [ ] ulcers [ ] exudates  Respiratory: [ ] dyspnea [ ] hemoptysis [ ] cough [ ] sputum	  Cardiovascular:  [ ] chest pain [ ] palpitations [ ] edema	  Gastrointestinal:  [ ] nausea [ ] vomiting [ ] diarrhea [ ] constipation [ ] pain	  Genitourinary:  [ ] dysuria [ ] frequency [ ] hematuria [ ] discharge [ ] flank pain  [ ] incontinence  Musculoskeletal:  [ ] myalgias [ ] arthralgias [ ] arthritis  [ ] back pain  Neurological:  [ ] headache [ ] seizures  [ ] confusion/altered mental status    Allergies  codeine (Anaphylaxis)        ANTIMICROBIALS:  ertapenem  IVPB 1000 every 24 hours  rifAXIMin 550 two times a day      OTHER MEDS:  MEDICATIONS  (STANDING):  insulin glargine Injectable (LANTUS) 15 at bedtime  insulin lispro (HumaLOG) corrective regimen sliding scale  three times a day before meals  insulin lispro (HumaLOG) corrective regimen sliding scale  at bedtime  lactulose Syrup 30 three times a day  midodrine. 30 every 8 hours  pantoprazole    Tablet 40 before breakfast  tamsulosin 0.4 at bedtime      Vital Signs Last 24 Hrs  T(C): 36.4 (17 Mar 2020 15:00), Max: 36.6 (17 Mar 2020 00:00)  T(F): 97.5 (17 Mar 2020 15:00), Max: 97.9 (17 Mar 2020 00:00)  HR: 81 (17 Mar 2020 19:00) (60 - 83)  BP: 100/53 (17 Mar 2020 08:00) (100/53 - 100/53)  BP(mean): 74 (17 Mar 2020 08:00) (74 - 74)  RR: 15 (17 Mar 2020 19:00) (2 - 49)  SpO2: 97% (17 Mar 2020 19:00) (91% - 100%)    PHYSICAL EXAMINATION:  General: Jaundice, Alert and Awake, NAD  HEENT: PERRL, EOMI, Scleral Icterus  Neck: Supple  Cardiac: RRR, No M/R/G  Resp: CTAB, No Wh/Rh/Ra  Abdomen: NBS, NT/ND, No HSM, No rigidity or guarding  MSK: No LE edema. No Calf tenderness  : + bob with bloody drainaghe  Skin: No rashes or lesions. Skin is warm and dry to the touch.   Neuro: Alert and Awake. CN 2-12 Grossly intact. Moves all four extremities spontaneously.  Psych: Calm, Pleasant, Cooperative                          7.7    12.05 )-----------( 50       ( 17 Mar 2020 16:56 )             22.5       03-17    137  |  98  |  26<H>  ----------------------------<  170<H>  5.4<H>   |  27  |  0.76    Ca    9.3      17 Mar 2020 16:56  Phos  3.3     03-17  Mg     2.1     03-17    TPro  5.0<L>  /  Alb  3.1<L>  /  TBili  9.1<H>  /  DBili  2.1<H>  /  AST  78<H>  /  ALT  45  /  AlkPhos  74  03-17          MICROBIOLOGY:  v  .Urine Clean Catch (Midstream)  03-16-20   Insufficient growth-culture reincubated  --  --      .Blood Blood-Peripheral  03-16-20   No growth to date.  --  --      .Abscess prostate abscess  03-15-20   Moderate Escherichia coli ESBL  --  Escherichia coli ESBL      .Urine Clean Catch (Midstream)  03-10-20   >100,000 CFU/ml Escherichia coli ESBL  --  Escherichia coli ESBL      .Blood Blood-Venous  03-10-20   No growth at 5 days.  --  --      .Blood Blood-Peripheral  03-10-20   No growth at 5 days.  --  --    CMV IgG Antibody: <0.20 U/mL (12-04-19 @ 08:33)  Toxoplasma IgG Screen: <3.0 IU/mL (12-04-19 @ 08:33)    RADIOLOGY:    <The imaging below has been reviewed and visualized by me independently. Findings as detailed in report below>    EXAM:  CT PELVIS ONLY                        EXAM:  CT ABDOMEN AND PELVIS OC IC                        PROCEDURE DATE:  03/12/2020    Prostate gland is again noted to be enlarged with diffusely low attenuation appearance, concerning for underlying abscess. Following instillation of contrast into the urinary bladder, there is evidence of extraluminal contrast tracking along the anterior aspect of the prostate gland. This may be secondary to fistulous communication with either the posterior wall of the urinary bladder or the urethra.  Diffusely thickened wall of the urinary bladder with mild infiltration of the perivesicle fat.  Cirrhotic configuration of the liver with evidence of portal hypertension and multiple varices throughout the abdomen.  Nonobstructing 0.7 cm left intrarenal calculus.

## 2020-03-17 NOTE — PROGRESS NOTE ADULT - SUBJECTIVE AND OBJECTIVE BOX
UROLOGY DAILY PROGRESS NOTE:     Subjective: Patient seen and examined at bedside. Patient transfused 1 UPRBC   Insulin drip started         Objective:  Vital signs  T(F): , Max: 97.9 (20 @ 00:00)  HR: 76 (20 @ 06:00)  BP: --  SpO2: 92% (20 @ 06:00)  Wt(kg): --    Output     I&O's Detail    16 Mar 2020 07:01  -  17 Mar 2020 07:00  --------------------------------------------------------  IN:    Albumin 5%  - 250 mL: 500 mL    dextrose 5%: 1750 mL    insulin regular Infusion: 44 mL    insulin regular Infusion: 18 mL    IV PiggyBack: 766.4 mL    multiple electrolytes Injection Type 1multiple electrolytes Injection Type 1: 625 mL    norepinephrine Infusion: 90.8 mL    Oral Fluid: 150 mL    Packed Red Blood Cells: 350 mL  Total IN: 4294.2 mL    OUT:    Indwelling Catheter - Urethral: 2450 mL  Total OUT: 2450 mL    Total NET: 1844.2 mL          Physical Exam:  Gen: NAD  Abd: soft NT/ND  Back: no CVAT  : bob cherry red irrigated no clots     Labs:    16.53 / 21.7  /0.81     19.89 / 21.0  /0.88                           7.4    16.53 )-----------( 48       ( 17 Mar 2020 05:24 )             21.7         139  |  99  |  25<H>  ----------------------------<  79  4.4   |  28  |  0.81    Ca    9.1      17 Mar 2020 05:24  Phos  3.3       Mg     1.9         TPro  4.6<L>  /  Alb  2.8<L>  /  TBili  8.7<H>  /  DBili  1.8<H>  /  AST  79<H>  /  ALT  41  /  AlkPhos  63    LABS/RADIOLOGY RESULTS:                          7.4    16.53 )-----------( 48       ( 17 Mar 2020 05:24 )             21.7       139  |  99  |  25<H>  ----------------------------<  79  4.4   |  28  |  0.81    Ca    9.1      17 Mar 2020 05:24  Phos  3.3       Mg     1.9         TPro  4.6<L>  /  Alb  2.8<L>  /  TBili  8.7<H>  /  DBili  1.8<H>  /  AST  79<H>  /  ALT  41  /  AlkPhos  63    PT/INR - ( 17 Mar 2020 05:24 )   PT: 30.2 sec;   INR: 2.55 ratio         PTT - ( 17 Mar 2020 05:24 )  PTT:68.3 secBlood Cultures    Blood Culture--    @ 01:53    Results  No growth to date.    Organism--    Organism ID--    Urine Culture    @ 01:53    --       Results  No growth to date.    Organism--    Organism ID--  Blood Culture--   03-15 @ 17:24    Results  Moderate Escherichia coli    Organism--    Organism ID--    Urine Culture   03-15 @ 17:24    --       Results  Moderate Escherichia coli    Organism--    Organism ID--  Urinalysis Basic - ( 10 Mar 2020 13:43 )    Color: Yellow / Appearance: Slightly Turbid / S.015 / pH:   Gluc:  / Ketone: Negative  / Bili: Negative / Urobili: 3 mg/dL   Blood:  / Protein: Trace / Nitrite: Negative   Leuk Esterase: Large / RBC: 5 /hpf / WBC 35 /HPF   Sq Epi:  / Non Sq Epi:  / Bacteria: Many      PT/INR - ( 17 Mar 2020 05:24 )   PT: 30.2 sec;   INR: 2.55 ratio         PTT - ( 17 Mar 2020 05:24 )  PTT:68.3 sec          Urine Cx:

## 2020-03-17 NOTE — PROGRESS NOTE ADULT - SUBJECTIVE AND OBJECTIVE BOX
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 Collateral information obtained from chart review as patient is not communicative during evaluation. During admission patient had CT scan which showed diffuse low density appearance of the prostate concerning for abscess while patient reporting difficulty with urination but denying dysuria, urgency, frequency, gross hematuria, or rectal pain/pressure. Patient was take to OR with urology on 3/15/20 and underwent cystoscopy and transurethral drainage of prostatic abscess. Postop patient developed hypotension, altered mental status and bright red blood in bob bag. SICU consulted for evaluation. Vasopressor support was initiated with Levo and Vaso and MTP was activated, 4 units of FFP and 4units of PRBC were transfused, also received 1unit platelets and vitamin K.    24 HOUR EVENTS:  -- Started on Insulin infusion to assist with glycemic control.   -- S/p pRBC x1  -- Initiated on 250 cc of 5% Albumin Q6H to assist with weaning off pressors.  -- HCO3- discontinued in the setting of worsening alkalosis. Transitioned to Plasma-lyte      NEURO  RASS:  0   GCS: 15     CAM ICU:  Exam: Awake, alert, and responsive.   [x] Adequacy of sedation and pain control has been assessed and adjusted    RESPIRATORY  RR: 7 (03-16-20 @ 23:30) (6 - 23)  SpO2: 93% (03-16-20 @ 23:30) (93% - 100%)  Wt(kg): --  Exam: unlabored, clear to auscultation bilaterally  Mechanical Ventilation:   ABG - ( 17 Mar 2020 00:03 )  pH: 7.43  /  pCO2: 51    /  pO2: 88    / HCO3: 34    / Base Excess: 8.9   /  SaO2: 98      Lactate: x        CARDIOVASCULAR  HR: 79 (03-16-20 @ 23:30) (60 - 136)  BP: --  BP(mean): --  ABP: 94/45 (03-16-20 @ 23:30) (92/44 - 139/53)  ABP(mean): 62 (03-16-20 @ 23:30) (61 - 87)  Wt(kg): --  CVP(cm H2O): --  Exam:  Cardiac Rhythm:  Perfusion     [x]Adequate   [ ]Inadequate  Mentation   [x]Normal       [ ]Reduced  Extremities  [x]Warm         [ ]Cool  Volume Status [ ]Hypervolemic [x]Euvolemic [ ]Hypovolemic  Meds: norepinephrine Infusion 0.09 MICROgram(s)/kG/Min IV Continuous <Continuous>  tamsulosin 0.4 milliGRAM(s) Oral at bedtime    GI/NUTRITION  Exam: Soft, non=tender, non-distended  Diet: NPO, except medications  Meds: lactulose Syrup 30 Gram(s) Oral three times a day  pantoprazole    Tablet 40 milliGRAM(s) Oral before breakfast      GENITOURINARY  I&O's Detail    03-15 @ 07:01  -  03-16 @ 07:00  --------------------------------------------------------  IN:    dextrose 5%: 875 mL    IV PiggyBack: 850 mL    multiple electrolytes Injection Type 1multiple electrolytes Injection Type 1: 125 mL    norepinephrine Infusion: 207.1 mL    Packed Red Blood Cells: 1200 mL    Plasma: 1000 mL    Solution: 350 mL  Total IN: 4607.1 mL    OUT:    Indwelling Catheter - Urethral: 1200 mL  Total OUT: 1200 mL    Total NET: 3407.1 mL      03-16 @ 07:01 - 03-17 @ 00:43  --------------------------------------------------------  IN:    Albumin 5%  - 250 mL: 250 mL    dextrose 5%: 1625 mL    insulin regular Infusion: 18 mL    insulin regular Infusion: 44 mL    IV PiggyBack: 100 mL    norepinephrine Infusion: 86.4 mL    Oral Fluid: 150 mL  Total IN: 2273.4 mL    OUT:    Indwelling Catheter - Urethral: 1515 mL  Total OUT: 1515 mL  Total NET: 758.4 mL      03-16    137  |  98  |  27<H>  ----------------------------<  229<H>  3.7   |  25  |  0.94    Ca    9.6      16 Mar 2020 18:01  Phos  2.3     03-16  Mg     2.1     03-16    TPro  5.2<L>  /  Alb  3.1<L>  /  TBili  6.8<H>  /  DBili  1.5<H>  /  AST  31  /  ALT  16  /  AlkPhos  91  03-15    [x] Bob catheter, indication: S/p transurethral drainage of periprostatic abscess  Meds: albumin human  5% IVPB 250 milliLiter(s) IV Intermittent every 6 hours  folic acid 1 milliGRAM(s) Oral daily  multiple electrolytes Injection Type 1 1000 milliLiter(s) IV Continuous <Continuous>    HEMATOLOGIC  Meds:   [x] VTE Prophylaxis                        7.5    22.56 )-----------( 60       ( 16 Mar 2020 18:01 )             22.1     PT/INR - ( 17 Mar 2020 00:12 )   PT: 30.0 sec;   INR: 2.53 ratio    PTT - ( 17 Mar 2020 00:12 )  PTT:72.7 sec  Transfusion     [x] PRBC   [ ] Platelets   [ ] FFP   [ ] Cryoprecipitate      INFECTIOUS DISEASES  T(C): 36.5 (03-16-20 @ 19:00), Max: 37.3 (03-16-20 @ 01:00)  Wt(kg): --  WBC Count: 22.56 K/uL (03-16 @ 18:01)  WBC Count: 26.18 K/uL (03-16 @ 13:03)  WBC Count: 27.03 K/uL (03-16 @ 08:52)  WBC Count: 33.08 K/uL (03-16 @ 04:43)  WBC Count: 31.36 K/uL (03-16 @ 01:03)    Recent Cultures:  Specimen Source: .Abscess prostate abscess, 03-15 @ 17:24; Results   Moderate Escherichia coli; Gram Stain: --; Organism: --  Specimen Source: .Urine Clean Catch (Midstream), 03-10 @ 17:23; Results   >100,000 CFU/ml Escherichia coli ESBL; Gram Stain: --; Organism: Escherichia coli ESBL  Specimen Source: .Blood Blood-Venous, 03-10 @ 16:54; Results   No growth at 5 days.; Gram Stain: --; Organism: --  Specimen Source: .Blood Blood-Peripheral, 03-10 @ 16:53; Results   No growth at 5 days.; Gram Stain: --; Organism: --  Meds: meropenem  IVPB 1000 milliGRAM(s) IV Intermittent every 8 hours  rifAXIMin 550 milliGRAM(s) Oral two times a day        ENDOCRINE  Capillary Blood Glucose  Meds: insulin regular Infusion 7 Unit(s)/Hr IV Continuous <Continuous>        ACCESS DEVICES:  [ ] Peripheral IV  [ ] Central Venous Line	[ ] R	[ ] L	[ ] IJ	[ ] Fem	[ ] SC	Placed:   [ ] Arterial Line		[ ] R	[ ] L	[ ] Fem	[ ] Rad	[ ] Ax	Placed:   [ ] PICC:					[ ] Mediport  [ ] Urinary Catheter, Date Placed:   [ ] Necessity of urinary, arterial, and venous catheters discussed    OTHER MEDICATIONS:  chlorhexidine 2% Cloths 1 Application(s) Topical <User Schedule>      CODE STATUS:     IMAGING:

## 2020-03-17 NOTE — PROGRESS NOTE ADULT - SUBJECTIVE AND OBJECTIVE BOX
s/p prostate abscess drain on 3/15  Beasley drain with hematuria    chlorhexidine 2% Cloths 1 Application(s) Topical <User Schedule>  ertapenem  IVPB 1000 milliGRAM(s) IV Intermittent every 24 hours  folic acid 1 milliGRAM(s) Oral daily  insulin lispro (HumaLOG) corrective regimen sliding scale   SubCutaneous three times a day before meals  insulin lispro (HumaLOG) corrective regimen sliding scale   SubCutaneous at bedtime  lactulose Syrup 30 Gram(s) Oral three times a day  midodrine. 30 milliGRAM(s) Oral every 8 hours  pantoprazole    Tablet 40 milliGRAM(s) Oral before breakfast  rifAXIMin 550 milliGRAM(s) Oral two times a day  tamsulosin 0.4 milliGRAM(s) Oral at bedtime      codeine (Anaphylaxis)      ROS otherwise negative     T(C): 36.4 (03-17-20 @ 15:00), Max: 36.6 (03-17-20 @ 00:00)  HR: 82 (03-17-20 @ 15:00) (60 - 90)  BP: 100/53 (03-17-20 @ 08:00) (100/53 - 100/53)  RR: 14 (03-17-20 @ 15:00) (2 - 49)  SpO2: 93% (03-17-20 @ 15:00) (91% - 100%)  PHYSICAL EXAM  Gen:  laying in bed, nad  H:  +icterus, eomi  CV:  RRR, S1, S2  Lungs:  CTA b/l  Ab soft/nt/nd  Ext:  no edema                          7.4    16.53 )-----------( 48       ( 17 Mar 2020 05:24 )             21.7                         7.1    19.89 )-----------( 52       ( 17 Mar 2020 00:12 )             21.0                         7.5    22.56 )-----------( 60       ( 16 Mar 2020 18:01 )             22.1   03-17    139  |  99  |  25<H>  ----------------------------<  79  4.4   |  28  |  0.81    Ca    9.1      17 Mar 2020 05:24  Phos  3.3     03-17  Mg     1.9     03-17    TPro  4.6<L>  /  Alb  2.8<L>  /  TBili  8.7<H>  /  DBili  1.8<H>  /  AST  79<H>  /  ALT  41  /  AlkPhos  63  03-17    Vitamin B12, Serum: 1480 pg/mL (02.29.20 @ 10:13)

## 2020-03-17 NOTE — PROGRESS NOTE ADULT - ASSESSMENT
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     PLAN:    NEURO:  - Monitor mental status - now improved, is responsive and arousable   - Avoiding narcotic and sedative medication including BZDs    RESPIRATORY:   - Saturating well on NC    CARDIOVASCULAR:  - Monitor BP - maintain MAP>65  - Weaning off Levo as tolerated, given 1 unit of pRBC and started on Albumin to optimize  - Lactate cleared appropriately     GI/NUTRITION:  - NPO except medications  - PPI  - Lactulose and Rifaximin resumed    GENITOURINARY/RENAL:  - Beasley catheter in place with clear urinary drainage.  - Plasma-lyte @ 125 cc/hr  - Creatinine cleared and within normal limits   - Home Flomax resumed    HEMATOLOGIC:  - s/p hemorrhage watch - downtrending. S/p 1 unit RBC  - Q6H CBC  - DVT ppx on hold    INFECTIOUS DISEASE:  - Meropenem for broad spectrum and coverage of previous ESBL E. Coli in Urine (3/10/20)  - Afebrile, leukocytosis improving.     ENDOCRINE:  - Monitor FS  - Insulin gtt. Endocrinology following    DISPO:  - SICU

## 2020-03-17 NOTE — PROGRESS NOTE ADULT - PROBLEM SELECTOR PLAN 3
BP goal per ICU team.    -I spent a total time of 26 mins with the patient at bedside/on unit of which more than 50% of time was spent on counseling/coordination of care.

## 2020-03-17 NOTE — PROGRESS NOTE ADULT - ATTENDING COMMENTS
Hepatology Staff: Yomi Medina MD    I saw and examined the patient along with  Dr. Motta  03-17-20.    Patient Medical Record, hospital course was reviewed and summarized as below:    Vitals: Vital Signs Last 24 Hrs  T(C): 36.5 (17 Mar 2020 08:00), Max: 36.6 (17 Mar 2020 00:00)  T(F): 97.7 (17 Mar 2020 08:00), Max: 97.9 (17 Mar 2020 00:00)  HR: 81 (17 Mar 2020 10:00) (60 - 102)  BP: 100/53 (17 Mar 2020 08:00) (100/53 - 100/53)  BP(mean): 74 (17 Mar 2020 08:00) (74 - 74)  RR: 18 (17 Mar 2020 10:00) (2 - 49)  SpO2: 97% (17 Mar 2020 10:00) (91% - 100%)    IV Fluids: albumin human  5% IVPB 250 milliLiter(s) IV Intermittent every 6 hours    Antibiotics:rifAXIMin 550 milliGRAM(s) Oral two times a day    Labs:Creatinine, Serum: 0.81 mg/dL (03-17-20 @ 05:24)  Bilirubin Total, Serum: 8.7 mg/dL (03-17-20 @ 05:24)  INR: 2.55 ratio (03-17-20 @ 05:24)  Creatinine, Serum: 0.88 mg/dL (03-17-20 @ 00:12)  Bilirubin Total, Serum: 8.6 mg/dL (03-17-20 @ 00:12)  INR: 2.53 ratio (03-17-20 @ 00:12)  Creatinine, Serum: 0.94 mg/dL (03-16-20 @ 18:01)  INR: 2.51 ratio (03-16-20 @ 18:01)  Creatinine, Serum: 1.13 mg/dL (03-16-20 @ 13:03)  INR: 2.34 ratio (03-16-20 @ 13:03)    MELD Score: 25  I/O: I&O's Summary    16 Mar 2020 07:01  -  17 Mar 2020 07:00  --------------------------------------------------------  IN: 4377.5 mL / OUT: 2550 mL / NET: 1827.5 mL    17 Mar 2020 07:01  -  17 Mar 2020 10:46  --------------------------------------------------------  IN: 416.6 mL / OUT: 225 mL / NET: 191.6 mL      Nutritional Status:   Albumin, Serum: 2.8 g/dL (03-17-20 @ 05:24)  Albumin, Serum: 3.0 g/dL (03-17-20 @ 00:12)    Last 24 hour events: Off IV pressors    Recommendations: Overall getting better after I& D of the prostatic abscess. Continue with current management. Keep listed for OLT..     Plan discussed with Primary team.

## 2020-03-17 NOTE — PROGRESS NOTE ADULT - ASSESSMENT
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     -f.u labs  insulin drip monitor FS  H/H minimal response to PRBC and INR 2.5  PRBC FFP and products reccomended  Follow up bob output irrigation as needed   possible replace with 3 way over wire with CBI i  Trend Creat  Meropenem for broad spectrum and coverage of previous ESBL E. Coli in Urine (3/10/20)  -DVT prophylaxis  -f/u GI, HEME, endocrine  - SICU care appreciated

## 2020-03-17 NOTE — PROGRESS NOTE ADULT - ASSESSMENT
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 He has had multiple recent hospitalizations, including hospitalization from 2/11/20-2/21/20 due to septic shock secondary to emphysematous cystitis and ESBL E. coli bacteremia and from 2/28/20-3/3/20 after a mechanical fall. He is currently re-admitted after another mechanical fall likely preceded by vasovagal syncope, with evidence of urosepsis as well as mild hepatic encephalopathy and mild MISA.    Impression:  #JEAN Cirrhosis: UNOS listed for liver transplant. MELD-Na 26 on 3/15  Decompensated by esophageal varices, ascites, h/o hepatic encephalopathy and h/o SBP.   -Ascites: Trace on CT abdomen/pelvis from 02/11/20  -Varices: Small non-bleeding EV on EGD from 12/2019.  -HCC: No focal liver lesions on MRI abdomen 12/2019.   -PSE: No asterixis.     #Recurrent urosepsis with  large prostatic abscess.  Emphesematous cystitis seen on during prior hospitalization with hx of ESBL Ecoli. CT scan during this admission showing enlarged prostate concerning for abscess. There is concern for possible colovesicular fistula.  Bcx from 3/10 negative  Ucx from 3/10 showing >100,000 ESBL Ecoli  CT pelvis with IV contrast showing diffusely enlarged prostate with extravasation of contrast concerning for fistula  s/p OR for I&D of prostate abscess on 3/15      #Recurrent falls: Differential includes deconditioning vs. diabetic neuropathy.   Previous work up from previous hospitalization:    -TTE unremarkable this admission, less likely cardiac cause. CT head without acute neurologic findings.   -Mental status at baseline on lactulose    Recommendations:  - urology consulted, s/p OR drainaged, f/u recommendations  - cw with IV abx  - ID following, f/u recommendations  - trend CBC and fever curve daily  - Hold diuretics for now  - Continue with midodrine 30mg TID  - Continue Lactulose and Rifaximin

## 2020-03-17 NOTE — PROGRESS NOTE ADULT - SUBJECTIVE AND OBJECTIVE BOX
[facility]    Sushila Dhaliwal, Doug, Morenita, Ayo, Clay, Colleen      [phone]    CHIEF COMPLAINT: Patient is a 64y old  Male who presents with a chief complaint of Syncopal episode at home at 0430 this am, LOC for approx 3 minutes per wife, pt had episode of shaking during that time. Hx of multiple falls. Pt reporting abdominal pain for X 1day, no nausea or vomiting, endorses loose stool on lactulose at home (17 Mar 2020 08:35)      Follow Up: cirrhosis, drainage of prostatic abscess    Interim history: The patient reports no new symptoms.  Denies chest discomfort and shortness of breath.  No abdominal pain.  No new neurologic symptoms. Was on pressors which were dc early this am      MEDICATIONS  (STANDING):  albumin human  5% IVPB 250 milliLiter(s) IV Intermittent every 6 hours  chlorhexidine 2% Cloths 1 Application(s) Topical <User Schedule>  folic acid 1 milliGRAM(s) Oral daily  insulin lispro (HumaLOG) corrective regimen sliding scale   SubCutaneous three times a day before meals  insulin lispro (HumaLOG) corrective regimen sliding scale   SubCutaneous at bedtime  lactulose Syrup 30 Gram(s) Oral three times a day  magnesium sulfate  IVPB 2 Gram(s) IV Intermittent once  meropenem  IVPB 1000 milliGRAM(s) IV Intermittent every 8 hours  pantoprazole    Tablet 40 milliGRAM(s) Oral before breakfast  rifAXIMin 550 milliGRAM(s) Oral two times a day  tamsulosin 0.4 milliGRAM(s) Oral at bedtime    MEDICATIONS  (PRN):      REVIEW OF SYSTEMS:  eye, ent, GI, , allergic, dermatologic, musculoskeletal and neurologic are negative except as described above    Vital Signs Last 24 Hrs  T(C): 36.5 (17 Mar 2020 08:00), Max: 36.6 (17 Mar 2020 00:00)  T(F): 97.7 (17 Mar 2020 08:00), Max: 97.9 (17 Mar 2020 00:00)  HR: 81 (17 Mar 2020 10:00) (60 - 104)  BP: 100/53 (17 Mar 2020 08:00) (100/53 - 100/53)  BP(mean): 74 (17 Mar 2020 08:00) (74 - 74)  RR: 18 (17 Mar 2020 10:00) (2 - 49)  SpO2: 97% (17 Mar 2020 10:00) (91% - 100%)    I&O's Summary    16 Mar 2020 07:01  -  17 Mar 2020 07:00  --------------------------------------------------------  IN: 4377.5 mL / OUT: 2550 mL / NET: 1827.5 mL    17 Mar 2020 07:01  -  17 Mar 2020 10:17  --------------------------------------------------------  IN: 416.6 mL / OUT: 225 mL / NET: 191.6 mL        Telemetry past 24h: sr pvcs    PHYSICAL EXAM:    Constitutional: well-nourished, well-developed, NAD   HEENT:  MMM, sclerae anicteric, conjunctivae clear, no oral cyanosis.  Pulmonary: Non-labored, breath sounds are clear bilaterally, No wheezing, rales or rhonchi  Cardiovascular: Regular, S1 and S2.  No murmur.  No rubs, gallops or clicks  Gastrointestinal: Bowel Sounds present, soft, dist, nontender.   Lymph: No peripheral edema.   Neurological: Alert, no focal deficits  Skin: No rashes.  Psych:  Mood & affect appropriate    LABS: All Labs Reviewed:                        7.4    16.53 )-----------( 48       ( 17 Mar 2020 05:24 )             21.7                         7.1    19.89 )-----------( 52       ( 17 Mar 2020 00:12 )             21.0                         7.5    22.56 )-----------( 60       ( 16 Mar 2020 18:01 )             22.1     17 Mar 2020 05:24    139    |  99     |  25     ----------------------------<  79     4.4     |  28     |  0.81   17 Mar 2020 00:12    138    |  97     |  26     ----------------------------<  85     3.6     |  30     |  0.88   16 Mar 2020 18:01    137    |  98     |  27     ----------------------------<  229    3.7     |  25     |  0.94     Ca    9.1        17 Mar 2020 05:24  Ca    9.4        17 Mar 2020 00:12  Ca    9.6        16 Mar 2020 18:01  Phos  3.3       17 Mar 2020 05:24  Phos  2.1       17 Mar 2020 00:12  Phos  2.3       16 Mar 2020 18:01  Mg     1.9       17 Mar 2020 05:24  Mg     2.1       17 Mar 2020 00:12  Mg     2.1       16 Mar 2020 18:01    TPro  4.6    /  Alb  2.8    /  TBili  8.7    /  DBili  1.8    /  AST  79     /  ALT  41     /  AlkPhos  63     17 Mar 2020 05:24  TPro  4.9    /  Alb  3.0    /  TBili  8.6    /  DBili  1.8    /  AST  85     /  ALT  42     /  AlkPhos  69     17 Mar 2020 00:12  TPro  5.2    /  Alb  3.1    /  TBili  6.8    /  DBili  1.5    /  AST  31     /  ALT  16     /  AlkPhos  91     15 Mar 2020 22:40    PT/INR - ( 17 Mar 2020 05:24 )   PT: 30.2 sec;   INR: 2.55 ratio         PTT - ( 17 Mar 2020 05:24 )  PTT:68.3 sec  CARDIAC MARKERS ( 15 Mar 2020 22:40 )  x     / x     / 352 U/L / x     / 2.3 ng/mL      Blood Culture: Organism --  Gram Stain Blood -- Gram Stain --  Specimen Source .Blood Blood-Peripheral  Culture-Blood --    Organism Escherichia coli ESBL  Gram Stain Blood -- Gram Stain --  Specimen Source .Abscess prostate abscess  Culture-Blood --            RADIOLOGY:    EKG:    Echo:    < from: TTE with Doppler (w/Cont) (02.29.20 @ 14:36) >    Patient name: JESSICA MARTIN  YOB: 1955   Age: 64 (M)   MR#: [id number]  Study Date: 2/29/2020  [facility]  Study quality: Technically difficult  Referring Physician: Cary Rivera MD  Blood Pressure: 122/64 mmHg  Height: 185 cm  Weight: 111 kg  BSA: 2.3 m2  ------------------------------------------------------------------------  PROCEDURE: Transthoracic echocardiogram with 2-D, M-Mode  and complete spectral and color flow Doppler. Verbal  consent was obtained for injection of  Ultrasonic Enhancing  Agent following a discussion of risks and benefits.  Following intravenous injection of Ultrasonic Enhancing  Agent , harmonic imaging was performed.  INDICATION: Pericardial effusion (noninflammatory) (I31.3)  ------------------------------------------------------------------------  Dimensions:    Normal Values:  LA:     4.2    2.0 - 4.0 cm  Ao:     3.7    2.0 - 3.8 cm  SEPTUM: 0.8    0.6 - 1.2 cm  PWT:    0.8    0.6 - 1.1 cm  LVIDd:  5.5    3.0 - 5.6 cm  LVIDs:  2.8    1.8 - 4.0 cm  Derived variables:  LVMI: 68 g/m2  RWT: 0.29  Fractional short: 49 %  EF (Visual Estimate): 70 %  Doppler Peak Velocity (m/sec): AoV=1.9  ------------------------------------------------------------------------  Observations:  Mitral Valve: Normal mitral valve. Mitral annular  calcification.  Aortic Valve/Aorta: Calcified aortic valve with normal  opening.  Normal aortic root size.  Left Atrium: Mildly dilated left atrium.  Left Ventricle: Endocardial visualization enhanced with  intravenous injection of Ultrasonic Enhancing Agent  (Definity).  Normal left ventricular internal dimensions and wall  thicknesses.  Normal left ventricular systolic function. No segmental  wall motion abnormalities.  Normal diastolic function.  Right Heart: Right atrium not well visualized. The right  ventricle is not well visualized. Normal tricuspid valve.  Normal pulmonic valve.  Pericardium/Pleura: Pericardial fat pad noted. No  pericardial effusion.  Hemodynamic: No evidence of pulmonary hypertension.  ------------------------------------------------------------------------  Conclusions:  Endocardial visualization enhanced with intravenous  injection of Ultrasonic Enhancing Agent (Definity).  Normal left ventricular systolic function. No segmental  wall motion abnormalities.  ------------------------------------------------------------------------  Confirmed on  2/29/2020 - 17:07:16 by Earl Plummer MD, FASE  ------------------------------------------------------------------------    < end of copied text >

## 2020-03-17 NOTE — PROGRESS NOTE ADULT - ASSESSMENT
63 y/o M w/ uncontrolled Type 2 DM w/ hyperglycemia complicated by neuropathy and retinopathy, JEAN cirrhosis admitted with fall at home, general weakness/syncope s/p cystoscopy and transurethral drainage of prostatic abscess (3/15/20) c/b postop hypotension, altered mental status and bright red blood in bob bag. Found to have a low am cortisol on workup for orthostasis. Tolerating POs, now off insulin gtt as acidosis and BG values improved over past 24 hours. Will restart basal insulin this evening. BG goal (100-180mg/dl).

## 2020-03-17 NOTE — PROGRESS NOTE ADULT - ASSESSMENT
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 syncope in setting of UTI/abscess.  He is s/p TURP, and now gross hematuria.  Had AMS and hypotension overnight, likely a component of both septic and hypovolemic shock.  s/p mm blood products, and not on IV pressor support    - Continue to trend H/H and transfuse as needed  - pressors weaned early this am, watch bp carefully  - Hold diuretics as not vol ol  - Eventually likely to resume midodrine  - I doubt this was arrhythmic but his prolonged QTC has been concerning. This was seen on his previous admission as well  - Avoid QTC prolonging drugs  - Monitor and replete electrolytes. Keep K>4.0 and Mg>2.0. Replete Mg today  - cont abx  - check daily ekg    - No signs of significant ischemia. Cath with non obs disease  - echo in 2/2020 with normal LV function. No need to repeat.   - urology for TURP/abscess drainage today    - Further cardiac workup will depend on clinical course.   - All other workup per primary team. Will followup.

## 2020-03-17 NOTE — PROGRESS NOTE ADULT - SUBJECTIVE AND OBJECTIVE BOX
Chief Complaint:  Patient is a 64y old  Male who presents with a chief complaint of Syncopal episode at home at 0430 this am, LOC for approx 3 minutes per wife, pt had episode of shaking during that time. Hx of multiple falls. Pt reporting abdominal pain for X 1day, no nausea or vomiting, endorses loose stool on lactulose at home (17 Mar 2020 07:12)      Interval Events:     Allergies:  codeine (Anaphylaxis)      Home Medications:    Hospital Medications:  albumin human  5% IVPB 250 milliLiter(s) IV Intermittent every 6 hours  chlorhexidine 2% Cloths 1 Application(s) Topical <User Schedule>  folic acid 1 milliGRAM(s) Oral daily  insulin lispro (HumaLOG) corrective regimen sliding scale   SubCutaneous three times a day before meals  insulin lispro (HumaLOG) corrective regimen sliding scale   SubCutaneous at bedtime  lactulose Syrup 30 Gram(s) Oral three times a day  magnesium sulfate  IVPB 2 Gram(s) IV Intermittent once  meropenem  IVPB 1000 milliGRAM(s) IV Intermittent every 8 hours  pantoprazole    Tablet 40 milliGRAM(s) Oral before breakfast  rifAXIMin 550 milliGRAM(s) Oral two times a day  tamsulosin 0.4 milliGRAM(s) Oral at bedtime      PMHX/PSHX:  GIB (gastrointestinal bleeding)  GERD with esophagitis  Hepatic encephalopathy  Obesity  Fatty liver disease, nonalcoholic  Renal stones  Hypertension  Neuropathy  Hypercholesteremia  Diabetes  S/P cholecystectomy  No significant past surgical history      Family history:  Family history of type 2 diabetes mellitus  Family history of hypertension  Family history of stomach cancer  No pertinent family history in first degree relatives      ROS:     General:  No wt loss, fevers, chills, night sweats, fatigue,   Eyes:  Good vision, no reported pain  ENT:  No sore throat, pain, runny nose, dysphagia  CV:  No pain, palpitations, hypo/hypertension  Resp:  No dyspnea, cough, tachypnea, wheezing  GI:  No pain, No nausea, No vomiting, No diarrhea, No constipation, No weight loss, No fever, No pruritis, No rectal bleeding, No tarry stools, No dysphagia,  :  No pain, bleeding, incontinence, nocturia  Muscle:  No pain, weakness  Neuro:  No weakness, tingling, memory problems  Psych:  No fatigue, insomnia, mood problems, depression  Endocrine:  No polyuria, polydipsia, cold/heat intolerance  Heme:  No petechiae, ecchymosis, easy bruisability  Skin:  No rash, tattoos, scars, edema      PHYSICAL EXAM:   Vital Signs:  Vital Signs Last 24 Hrs  T(C): 36.6 (17 Mar 2020 06:00), Max: 36.6 (17 Mar 2020 00:00)  T(F): 97.8 (17 Mar 2020 06:00), Max: 97.9 (17 Mar 2020 00:00)  HR: 76 (17 Mar 2020 06:00) (60 - 109)  BP: --  BP(mean): --  RR: 3 (17 Mar 2020 06:) (3 - 33)  SpO2: 92% (17 Mar 2020 06:) (92% - 100%)  Daily     Daily Weight in k.9 (17 Mar 2020 06:)    GENERAL:  NAD  HEENT:  NC/AT  CHEST:  Full & symmetric excursion, no increased effort, breath sounds clear  HEART:  Regular rhythm, S1, S2, no JVD  ABDOMEN:  Soft, non-tender, non-distended, normoactive bowel sounds  EXTREMITIES:  no cyanosis, clubbing or edema  SKIN:  Jaundiced  NEURO:  Alert, oriented  LABS:                        7.4    16.53 )-----------( 48       ( 17 Mar 2020 05:24 )             21.7     03-17    139  |  99  |  25<H>  ----------------------------<  79  4.4   |  28  |  0.81    Ca    9.1      17 Mar 2020 05:24  Phos  3.3     03-17  Mg     1.9     -17    TPro  4.6<L>  /  Alb  2.8<L>  /  TBili  8.7<H>  /  DBili  1.8<H>  /  AST  79<H>  /  ALT  41  /  AlkPhos  63  03-17    LIVER FUNCTIONS - ( 17 Mar 2020 05:24 )  Alb: 2.8 g/dL / Pro: 4.6 g/dL / ALK PHOS: 63 U/L / ALT: 41 U/L / AST: 79 U/L / GGT: x           PT/INR - ( 17 Mar 2020 05:24 )   PT: 30.2 sec;   INR: 2.55 ratio         PTT - ( 17 Mar 2020 05:24 )  PTT:68.3 sec        Imaging:

## 2020-03-17 NOTE — PROGRESS NOTE ADULT - ATTENDING COMMENTS
N- AAOx3 [place] 0  C- On and off pressors, hemodynamic instability. Start midodrine.  R- RA sat95%  R- MISA improved  GI- Continue lactulose, rifaximin, responded well to albumin, d/c albumin. Low Na diet.  H- Mild response to 1U PRBC, continue monitoring for hemorrhage. Monitor INR for signs of bleeding, no need for FFP. Portal HTN related thrombocytopenia. Restrict blood products to avoid sensitization and transfusion related immunosuppression.   D/c CVC start PIV.  Full code  Dispo SICU

## 2020-03-17 NOTE — PROGRESS NOTE ADULT - SUBJECTIVE AND OBJECTIVE BOX
Diabetes Follow up note:    Chief complaint: f/u T2DM     Interval Hx: Insulin gtt stopped overnight. BG 139mg/dl at lunchtime. Pt reports PO intake improved today. Had urinary catheter removed earlier. Off pressors. Follows w/Dr Goldberg as outpt for his DM.     Review of Systems:  General: R hand numbness (improving)  GI: Tolerating POs. Denies N/V/D/Abd pain  CV: Denies CP/SOB  ENDO: No S&Sx of hypoglycemia  MEDS:  insulin lispro (HumaLOG) corrective regimen sliding scale   SubCutaneous three times a day before meals  insulin lispro (HumaLOG) corrective regimen sliding scale   SubCutaneous at bedtime    ertapenem  IVPB 1000 milliGRAM(s) IV Intermittent every 24 hours  rifAXIMin 550 milliGRAM(s) Oral two times a day    Allergies    codeine (Anaphylaxis)        PE:  General: Male lying in bed. NAD>   Vital Signs Last 24 Hrs  T(C): 36.4 (17 Mar 2020 15:00), Max: 36.6 (17 Mar 2020 00:00)  T(F): 97.5 (17 Mar 2020 15:00), Max: 97.9 (17 Mar 2020 00:00)  HR: 82 (17 Mar 2020 15:00) (60 - 87)  BP: 100/53 (17 Mar 2020 08:00) (100/53 - 100/53)  BP(mean): 74 (17 Mar 2020 08:00) (74 - 74)  RR: 14 (17 Mar 2020 15:00) (2 - 49)  SpO2: 93% (17 Mar 2020 15:00) (91% - 100%)  CV: S1, S2. NSR on monitor.   Abd: Soft, NT,ND, Obese.   Extremities: Warm  Neuro: A&O X3    LABS:  POCT Blood Glucose.: 139 mg/dL (03-17-20 @ 12:28)  POCT Blood Glucose.: 95 mg/dL (03-17-20 @ 08:10)  POCT Blood Glucose.: 83 mg/dL (03-17-20 @ 03:38)  POCT Blood Glucose.: 82 mg/dL (03-17-20 @ 01:31)  POCT Blood Glucose.: 84 mg/dL (03-17-20 @ 00:08)  POCT Blood Glucose.: 102 mg/dL (03-16-20 @ 23:00)  POCT Blood Glucose.: 112 mg/dL (03-16-20 @ 22:03)  POCT Blood Glucose.: 129 mg/dL (03-16-20 @ 21:13)  POCT Blood Glucose.: 134 mg/dL (03-16-20 @ 20:09)  POCT Blood Glucose.: 182 mg/dL (03-16-20 @ 18:58)  POCT Blood Glucose.: 207 mg/dL (03-16-20 @ 18:04)  POCT Blood Glucose.: 205 mg/dL (03-16-20 @ 17:25)  POCT Blood Glucose.: 284 mg/dL (03-16-20 @ 15:55)  POCT Blood Glucose.: 286 mg/dL (03-16-20 @ 14:53)  POCT Blood Glucose.: 316 mg/dL (03-16-20 @ 14:17)  POCT Blood Glucose.: 292 mg/dL (03-16-20 @ 12:48)  POCT Blood Glucose.: 317 mg/dL (03-16-20 @ 12:02)  POCT Blood Glucose.: 334 mg/dL (03-16-20 @ 10:28)  POCT Blood Glucose.: 106 mg/dL (03-15-20 @ 20:50)  POCT Blood Glucose.: 122 mg/dL (03-15-20 @ 17:37)  POCT Blood Glucose.: 182 mg/dL (03-15-20 @ 08:32)  POCT Blood Glucose.: 274 mg/dL (03-14-20 @ 21:41)  POCT Blood Glucose.: 247 mg/dL (03-14-20 @ 17:25)                            7.4    16.53 )-----------( 48       ( 17 Mar 2020 05:24 )             21.7       03-17    139  |  99  |  25<H>  ----------------------------<  79  4.4   |  28  |  0.81    Ca    9.1      17 Mar 2020 05:24  Phos  3.3     03-17  Mg     1.9     03-17    TPro  4.6<L>  /  Alb  2.8<L>  /  TBili  8.7<H>  /  DBili  1.8<H>  /  AST  79<H>  /  ALT  41  /  AlkPhos  63  03-17      Thyroid Function Tests:  03-11 @ 12:37 TSH 0.97 FreeT4 -- T3 -- Anti TPO -- Anti Thyroglobulin Ab -- TSI --      Hemoglobin A1C, Whole Blood: 6.4 % <H> [4.0 - 5.6] (02-12-20 @ 17:08)          Contact number: kit [phone] or [phone]

## 2020-03-17 NOTE — PROGRESS NOTE ADULT - PROBLEM SELECTOR PLAN 1
-test BG AC/HS  -Start Lantus 15 units QHS  -c/w Humalog moderate correction scale AC and Mod HS scale  -Will restart premeal once PO intake consistent and BG >180mg/dl  -Discharge on basal/bolus  -f/u outpt w/Dr Goldberg  -discussed w/pt and SICU team

## 2020-03-17 NOTE — PROGRESS NOTE ADULT - ASSESSMENT
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   Recent admission for ESBL E coli Bacteremia and UTI  Patient found to have pyuria on U/A and urine with visible thick sediment  CT A/P (reviewed with radiology) with significant interval increase in prostate size from last month (concerning for abscess)  Could not tolerate MRI  CT A/P (with PO/IV contrast) without concrete abscess but suspicious appearing prostate. Also with fistulous communication with either the posterior wall of the urinary bladder or the urethra.    On 3/15 underwent drainage or prostate abscess   Worsening leukocytosis and septic shock after drainage  Anticipate may have had transient bacteremia with prostate manipulation  Prostate Abscess Cultures with ESBL E coli  Can deescalate to Ertapenem    Given prostatitis/UTI/abscess patient is not cleared from ID perspective at this time for OLT    Overall, UTI, Prostate Abscess, Leukocytosis, Septic Shock, Positive Culture Finding (UCx), Pre-OLT    --Stop Meropenem  --Start Ertapenem 1g IV Q24H  --Continue to follow CBC with diff  --Continue to follow renal function (Cr/BUN)  --Continue to follow temperature curve  --Follow up on preliminary blood cultures  --Urology Recommendations noted    I will continue to follow. Please feel free to contact me with any further questions.    Eusebio Pérez M.D.  [facility] Division of Infectious Disease  8AM-5PM: Pager Number [phone]  After Hours (or if no response): Please contact the Infectious Diseases Office at [phone]     The above assessment and plan were discussed with Dr Argueta .

## 2020-03-17 NOTE — PROGRESS NOTE ADULT - ASSESSMENT
63yo M with IDDM, dyslipidemia, obesity, GERD, HFpEF with mild LV diastolic dysfunction, and decompensated JEAN cirrhosis complicated by ascites, history of SBP, hepatic encephalopathy, and chronic anemia with a history of duodenal ulcer as well as GAVE and duodenal AVM s/p APC (last on 10/11/19), who is UNOS listed for liver transplantation at [facility] who presents with fall at home. Hematology consulted for his hx of anemia/thrombocytopenia       Anemia -- had extensive w/u in the past. [place] to be related to ACD. Also with MGUS  -- hgb adequate  -- transfuse for hgb <7  -- monitor CBC      thrombocytopenia -- chronic and stable, likely due to cirrhosis  -- can be exacerbated by acute events  -- monitor CBC  -- transfuse for plts <10 if no bleeding, <50 if with bleeding    Prostate abscess  -continue abx  -f/u /ID    Javi Marcos MD  Hematology/Oncology  Cell:  [phone]  Office Phone: [phone]  Office Fax:  [phone] [address] [place] [address]

## 2020-03-18 LAB
ALBUMIN SERPL ELPH-MCNC: 2.9 G/DL — LOW (ref 3.3–5)
ALP SERPL-CCNC: 87 U/L — SIGNIFICANT CHANGE UP (ref 40–120)
ALT FLD-CCNC: 40 U/L — SIGNIFICANT CHANGE UP (ref 10–45)
ANION GAP SERPL CALC-SCNC: 10 MMOL/L — SIGNIFICANT CHANGE UP (ref 5–17)
ANION GAP SERPL CALC-SCNC: 7 MMOL/L — SIGNIFICANT CHANGE UP (ref 5–17)
APTT BLD: 49.7 SEC — HIGH (ref 27.5–36.3)
APTT BLD: 56.7 SEC — HIGH (ref 27.5–36.3)
AST SERPL-CCNC: 69 U/L — HIGH (ref 10–40)
BILIRUB DIRECT SERPL-MCNC: 1.8 MG/DL — HIGH (ref 0–0.2)
BILIRUB INDIRECT FLD-MCNC: 6.4 MG/DL — HIGH (ref 0.2–1)
BILIRUB SERPL-MCNC: 8.2 MG/DL — HIGH (ref 0.2–1.2)
BUN SERPL-MCNC: 26 MG/DL — HIGH (ref 7–23)
BUN SERPL-MCNC: 27 MG/DL — HIGH (ref 7–23)
CALCIUM SERPL-MCNC: 9.3 MG/DL — SIGNIFICANT CHANGE UP (ref 8.4–10.5)
CALCIUM SERPL-MCNC: 9.4 MG/DL — SIGNIFICANT CHANGE UP (ref 8.4–10.5)
CHLORIDE SERPL-SCNC: 99 MMOL/L — SIGNIFICANT CHANGE UP (ref 96–108)
CHLORIDE SERPL-SCNC: 99 MMOL/L — SIGNIFICANT CHANGE UP (ref 96–108)
CO2 SERPL-SCNC: 27 MMOL/L — SIGNIFICANT CHANGE UP (ref 22–31)
CO2 SERPL-SCNC: 30 MMOL/L — SIGNIFICANT CHANGE UP (ref 22–31)
CREAT SERPL-MCNC: 0.78 MG/DL — SIGNIFICANT CHANGE UP (ref 0.5–1.3)
CREAT SERPL-MCNC: 0.79 MG/DL — SIGNIFICANT CHANGE UP (ref 0.5–1.3)
FIBRINOGEN PPP-MCNC: 141 MG/DL — LOW (ref 350–510)
FIBRINOGEN PPP-MCNC: 144 MG/DL — LOW (ref 350–510)
GAS PNL BLDA: SIGNIFICANT CHANGE UP
GAS PNL BLDV: SIGNIFICANT CHANGE UP
GLUCOSE BLDC GLUCOMTR-MCNC: 159 MG/DL — HIGH (ref 70–99)
GLUCOSE BLDC GLUCOMTR-MCNC: 190 MG/DL — HIGH (ref 70–99)
GLUCOSE BLDC GLUCOMTR-MCNC: 210 MG/DL — HIGH (ref 70–99)
GLUCOSE SERPL-MCNC: 158 MG/DL — HIGH (ref 70–99)
GLUCOSE SERPL-MCNC: 212 MG/DL — HIGH (ref 70–99)
HCT VFR BLD CALC: 22 % — LOW (ref 39–50)
HCT VFR BLD CALC: 22 % — LOW (ref 39–50)
HCT VFR BLD CALC: 22.9 % — LOW (ref 39–50)
HGB BLD-MCNC: 7.3 G/DL — LOW (ref 13–17)
HGB BLD-MCNC: 7.4 G/DL — LOW (ref 13–17)
HGB BLD-MCNC: 7.5 G/DL — LOW (ref 13–17)
INR BLD: 2.12 RATIO — HIGH (ref 0.88–1.16)
INR BLD: 2.32 RATIO — HIGH (ref 0.88–1.16)
MAGNESIUM SERPL-MCNC: 1.9 MG/DL — SIGNIFICANT CHANGE UP (ref 1.6–2.6)
MAGNESIUM SERPL-MCNC: 2 MG/DL — SIGNIFICANT CHANGE UP (ref 1.6–2.6)
MCHC RBC-ENTMCNC: 32 PG — SIGNIFICANT CHANGE UP (ref 27–34)
MCHC RBC-ENTMCNC: 32.1 PG — SIGNIFICANT CHANGE UP (ref 27–34)
MCHC RBC-ENTMCNC: 32.5 PG — SIGNIFICANT CHANGE UP (ref 27–34)
MCHC RBC-ENTMCNC: 32.8 GM/DL — SIGNIFICANT CHANGE UP (ref 32–36)
MCHC RBC-ENTMCNC: 33.2 GM/DL — SIGNIFICANT CHANGE UP (ref 32–36)
MCHC RBC-ENTMCNC: 33.6 GM/DL — SIGNIFICANT CHANGE UP (ref 32–36)
MCV RBC AUTO: 96.5 FL — SIGNIFICANT CHANGE UP (ref 80–100)
MCV RBC AUTO: 96.5 FL — SIGNIFICANT CHANGE UP (ref 80–100)
MCV RBC AUTO: 97.9 FL — SIGNIFICANT CHANGE UP (ref 80–100)
NRBC # BLD: 0 /100 WBCS — SIGNIFICANT CHANGE UP (ref 0–0)
PHOSPHATE SERPL-MCNC: 2.5 MG/DL — SIGNIFICANT CHANGE UP (ref 2.5–4.5)
PHOSPHATE SERPL-MCNC: 2.8 MG/DL — SIGNIFICANT CHANGE UP (ref 2.5–4.5)
PLATELET # BLD AUTO: 45 K/UL — LOW (ref 150–400)
PLATELET # BLD AUTO: 45 K/UL — LOW (ref 150–400)
PLATELET # BLD AUTO: 52 K/UL — LOW (ref 150–400)
POTASSIUM SERPL-MCNC: 5.1 MMOL/L — SIGNIFICANT CHANGE UP (ref 3.5–5.3)
POTASSIUM SERPL-MCNC: 5.3 MMOL/L — SIGNIFICANT CHANGE UP (ref 3.5–5.3)
POTASSIUM SERPL-SCNC: 5.1 MMOL/L — SIGNIFICANT CHANGE UP (ref 3.5–5.3)
POTASSIUM SERPL-SCNC: 5.3 MMOL/L — SIGNIFICANT CHANGE UP (ref 3.5–5.3)
PROT SERPL-MCNC: 4.9 G/DL — LOW (ref 6–8.3)
PROTHROM AB SERPL-ACNC: 24.9 SEC — HIGH (ref 10–12.9)
PROTHROM AB SERPL-ACNC: 27.1 SEC — HIGH (ref 10–12.9)
RBC # BLD: 2.28 M/UL — LOW (ref 4.2–5.8)
RBC # BLD: 2.28 M/UL — LOW (ref 4.2–5.8)
RBC # BLD: 2.34 M/UL — LOW (ref 4.2–5.8)
RBC # FLD: 20.4 % — HIGH (ref 10.3–14.5)
RBC # FLD: 20.6 % — HIGH (ref 10.3–14.5)
RBC # FLD: 21 % — HIGH (ref 10.3–14.5)
RENIN PLAS-CCNC: 2.9 NG/ML/HR — SIGNIFICANT CHANGE UP (ref 0.17–5.38)
SODIUM SERPL-SCNC: 136 MMOL/L — SIGNIFICANT CHANGE UP (ref 135–145)
SODIUM SERPL-SCNC: 136 MMOL/L — SIGNIFICANT CHANGE UP (ref 135–145)
WBC # BLD: 7.21 K/UL — SIGNIFICANT CHANGE UP (ref 3.8–10.5)
WBC # BLD: 8.29 K/UL — SIGNIFICANT CHANGE UP (ref 3.8–10.5)
WBC # BLD: 9.82 K/UL — SIGNIFICANT CHANGE UP (ref 3.8–10.5)
WBC # FLD AUTO: 7.21 K/UL — SIGNIFICANT CHANGE UP (ref 3.8–10.5)
WBC # FLD AUTO: 8.29 K/UL — SIGNIFICANT CHANGE UP (ref 3.8–10.5)
WBC # FLD AUTO: 9.82 K/UL — SIGNIFICANT CHANGE UP (ref 3.8–10.5)

## 2020-03-18 PROCEDURE — 99232 SBSQ HOSP IP/OBS MODERATE 35: CPT

## 2020-03-18 PROCEDURE — 71045 X-RAY EXAM CHEST 1 VIEW: CPT | Mod: 26

## 2020-03-18 PROCEDURE — 99291 CRITICAL CARE FIRST HOUR: CPT

## 2020-03-18 RX ORDER — ALBUMIN HUMAN 25 %
250 VIAL (ML) INTRAVENOUS ONCE
Refills: 0 | Status: COMPLETED | OUTPATIENT
Start: 2020-03-18 | End: 2020-03-18

## 2020-03-18 RX ADMIN — LACTULOSE 30 GRAM(S): 10 SOLUTION ORAL at 14:02

## 2020-03-18 RX ADMIN — CHLORHEXIDINE GLUCONATE 1 APPLICATION(S): 213 SOLUTION TOPICAL at 05:30

## 2020-03-18 RX ADMIN — Medication 125 MILLIMOLE(S): at 08:39

## 2020-03-18 RX ADMIN — LACTULOSE 30 GRAM(S): 10 SOLUTION ORAL at 05:30

## 2020-03-18 RX ADMIN — INSULIN GLARGINE 15 UNIT(S): 100 INJECTION, SOLUTION SUBCUTANEOUS at 22:21

## 2020-03-18 RX ADMIN — Medication 125 MILLILITER(S): at 10:53

## 2020-03-18 RX ADMIN — LACTULOSE 30 GRAM(S): 10 SOLUTION ORAL at 22:19

## 2020-03-18 RX ADMIN — MIDODRINE HYDROCHLORIDE 30 MILLIGRAM(S): 2.5 TABLET ORAL at 14:02

## 2020-03-18 RX ADMIN — MIDODRINE HYDROCHLORIDE 30 MILLIGRAM(S): 2.5 TABLET ORAL at 05:30

## 2020-03-18 RX ADMIN — Medication 2: at 12:42

## 2020-03-18 RX ADMIN — PANTOPRAZOLE SODIUM 40 MILLIGRAM(S): 20 TABLET, DELAYED RELEASE ORAL at 08:39

## 2020-03-18 RX ADMIN — Medication 1 MILLIGRAM(S): at 12:42

## 2020-03-18 RX ADMIN — TAMSULOSIN HYDROCHLORIDE 0.4 MILLIGRAM(S): 0.4 CAPSULE ORAL at 22:18

## 2020-03-18 RX ADMIN — ERTAPENEM SODIUM 120 MILLIGRAM(S): 1 INJECTION, POWDER, LYOPHILIZED, FOR SOLUTION INTRAMUSCULAR; INTRAVENOUS at 15:21

## 2020-03-18 RX ADMIN — Medication 2: at 17:56

## 2020-03-18 RX ADMIN — MIDODRINE HYDROCHLORIDE 30 MILLIGRAM(S): 2.5 TABLET ORAL at 22:18

## 2020-03-18 RX ADMIN — Medication 2: at 08:39

## 2020-03-18 NOTE — PROGRESS NOTE ADULT - ASSESSMENT
JESSICA MARTIN is a 64y Male with pmh DM2, HLD, obesity, BPH, Orthostatic hypotension, GERD, HTN, Neuropathy, Obesity, HFpEF with mild LV diastolic dysfunction, and decompensated JEAN cirrhosis complicated by ascites, history of SBP, hepatic encephalopathy, and chronic anemia with a history of duodenal ulcer and duodenal AVM who is UNOS listed for liver transplantation at [facility] admitted with fall at home, general weakness/syncope s/p cystoscopy and transurethral drainage of prostatic abscess (3/15/20) c/b postop hypotension, altered mental status and bright red blood in bob bag.     PLAN:    NEURO: sepsis vs metabolic encephalopathy, now resolved  - Monitor mental status - now improved, is responsive and arousable   - Avoiding narcotic and sedative medication including BZDs    RESPIRATORY:   - no active issues    CARDIOVASCULAR:  - Monitor BP - maintain MAP>65  - c/w midodrine 30mg po q8h  - currently off iv pressors    GI/NUTRITION:  - regular diet, consistent carb  - PPI  - Lactulose and rifaximin    GENITOURINARY/RENAL:  - IVL  - Creatinine cleared and within normal limits   - tamsulosin    HEMATOLOGIC:  - no pRBC transfusion requirements in prior day, h/h stabalizing  - Q12H CBC  - DVT ppx on hold    INFECTIOUS DISEASE:  - ERtapenem 1000mg qd per ID   - Afebrile, leukocytosis improving.     ENDOCRINE:  - Monitor FS  - Insulin SS.    DISPO:  - SICU    --  ENEDINA Sarabia MD PGY-II  Surgical Intensive Care Unit  [facility]  Phone: [phone]

## 2020-03-18 NOTE — PROGRESS NOTE ADULT - SUBJECTIVE AND OBJECTIVE BOX
Subjective:  Patient seen at bedside. Offers no complaints. No events overnight.     Objectives:  T(C): 36.9 (03-18-20 @ 03:00), Max: 36.9 (03-18-20 @ 03:00)  HR: 75 (03-18-20 @ 06:00) (73 - 81)  BP: --  RR: 16 (03-18-20 @ 06:00) (10 - 21)  SpO2: 98% (03-18-20 @ 06:00) (94% - 100%)  Wt(kg): --    03-17 @ 07:01  -  03-18 @ 07:00  --------------------------------------------------------  IN:    Albumin 5%  - 250 mL: 250 mL    IV PiggyBack: 216.6 mL    Oral Fluid: 300 mL  Total IN: 766.6 mL    OUT:    Indwelling Catheter - Urethral: 2870 mL  Total OUT: 2870 mL    Total NET: -2103.4 mL          Physcial Exam  GENERAL: NAD, well-developed  ABDOMEN: Soft,   GENITOURINARY; bob in place draining red urine, no clots. Bladder irrigated with NS- no clots- clear punch after.   PSYCH: AAOx3    LABS:                        7.4    8.29  )-----------( 45       ( 18 Mar 2020 07:44 )             22.0     03-18    136  |  99  |  27<H>  ----------------------------<  212<H>  5.1   |  27  |  0.79    Ca    9.3      18 Mar 2020 00:35  Phos  2.8     03-18  Mg     2.0     03-18    TPro  4.9<L>  /  Alb  2.9<L>  /  TBili  8.2<H>  /  DBili  1.8<H>  /  AST  69<H>  /  ALT  40  /  AlkPhos  87  03-18    CAPILLARY BLOOD GLUCOSE      POCT Blood Glucose.: 212 mg/dL (17 Mar 2020 22:32)    PT/INR - ( 18 Mar 2020 00:35 )   PT: 27.1 sec;   INR: 2.32 ratio         PTT - ( 18 Mar 2020 00:35 )  PTT:56.7 sec  CAPILLARY BLOOD GLUCOSE      POCT Blood Glucose.: 212 mg/dL (17 Mar 2020 22:32)

## 2020-03-18 NOTE — OCCUPATIONAL THERAPY INITIAL EVALUATION ADULT - ADDITIONAL COMMENTS
CT Pelvis (3/13): Prostate gland is again noted to be enlarged with diffusely low attenuation appearance, concerning for underlying abscess.

## 2020-03-18 NOTE — PROGRESS NOTE ADULT - ATTENDING COMMENTS
Hepatology Staff: Yomi Medina MD    I saw and examined the patient along with  Dr. Motta   03-18-20.    Patient Medical Record, hospital course was reviewed and summarized as below:    Vitals: Vital Signs Last 24 Hrs  T(C): 36.9 (18 Mar 2020 07:00), Max: 36.9 (18 Mar 2020 03:00)  T(F): 98.4 (18 Mar 2020 07:00), Max: 98.4 (18 Mar 2020 03:00)  HR: 73 (18 Mar 2020 09:00) (73 - 84)  BP: 105/56 (18 Mar 2020 09:00) (104/58 - 105/56)  BP(mean): 75 (18 Mar 2020 09:00) (75 - 77)  RR: 10 (18 Mar 2020 09:00) (7 - 22)  SpO2: 95% (18 Mar 2020 09:00) (93% - 100%)    Antibiotics:ertapenem  IVPB 1000 milliGRAM(s) IV Intermittent every 24 hours    Diuretics:  Labs:Creatinine, Serum: 0.78 mg/dL (03-18-20 @ 07:44)  Creatinine, Serum: 0.79 mg/dL (03-18-20 @ 00:35)  Bilirubin Total, Serum: 8.2 mg/dL (03-18-20 @ 00:35)  INR: 2.32 ratio (03-18-20 @ 00:35)  Creatinine, Serum: 0.75 mg/dL (03-17-20 @ 19:21)  Creatinine, Serum: 0.76 mg/dL (03-17-20 @ 16:56)  Bilirubin Total, Serum: 9.1 mg/dL (03-17-20 @ 16:56)  INR: 2.37 ratio (03-17-20 @ 16:56)    MELD Score: 24    I/O: I&O's Summary    17 Mar 2020 07:01  -  18 Mar 2020 07:00  --------------------------------------------------------  IN: 766.6 mL / OUT: 2870 mL / NET: -2103.4 mL    18 Mar 2020 07:01  -  18 Mar 2020 10:01  --------------------------------------------------------  IN: 68.3 mL / OUT: 400 mL / NET: -331.7 mL      Nutritional Status:   Albumin, Serum: 2.9 g/dL (03-18-20 @ 00:35)  Albumin, Serum: 3.1 g/dL (03-17-20 @ 16:56)    Last 24 hour events: Overall better with normalized WBC    Recommendations: Cont with current antibiotics, supportive care, PT/OT. Keep pt listed for LT.    Plan discussed with Primary team.

## 2020-03-18 NOTE — PROGRESS NOTE ADULT - SUBJECTIVE AND OBJECTIVE BOX
Chief Complaint:  Patient is a 64y old  Male who presents with a chief complaint of Syncopal episode at home at 0430 this am, LOC for approx 3 minutes per wife, pt had episode of shaking during that time. Hx of multiple falls. Pt reporting abdominal pain for X 1day, no nausea or vomiting, endorses loose stool on lactulose at home (18 Mar 2020 08:08)      Interval Events:   No new complaints this AM      Allergies:  codeine (Anaphylaxis)      Home Medications:    Hospital Medications:  chlorhexidine 2% Cloths 1 Application(s) Topical <User Schedule>  ertapenem  IVPB 1000 milliGRAM(s) IV Intermittent every 24 hours  folic acid 1 milliGRAM(s) Oral daily  insulin glargine Injectable (LANTUS) 15 Unit(s) SubCutaneous at bedtime  insulin lispro (HumaLOG) corrective regimen sliding scale   SubCutaneous three times a day before meals  insulin lispro (HumaLOG) corrective regimen sliding scale   SubCutaneous at bedtime  lactulose Syrup 30 Gram(s) Oral three times a day  midodrine. 30 milliGRAM(s) Oral every 8 hours  pantoprazole    Tablet 40 milliGRAM(s) Oral before breakfast  rifAXIMin 550 milliGRAM(s) Oral two times a day  tamsulosin 0.4 milliGRAM(s) Oral at bedtime      PMHX/PSHX:  GIB (gastrointestinal bleeding)  GERD with esophagitis  Hepatic encephalopathy  Obesity  Fatty liver disease, nonalcoholic  Renal stones  Hypertension  Neuropathy  Hypercholesteremia  Diabetes  S/P cholecystectomy  No significant past surgical history      Family history:  Family history of type 2 diabetes mellitus  Family history of hypertension  Family history of stomach cancer  No pertinent family history in first degree relatives      ROS:     General:  No wt loss, fevers, chills, night sweats, fatigue,   Eyes:  Good vision, no reported pain  ENT:  No sore throat, pain, runny nose, dysphagia  CV:  No pain, palpitations, hypo/hypertension  Resp:  No dyspnea, cough, tachypnea, wheezing  GI:  No pain, No nausea, No vomiting, No diarrhea, No constipation, No weight loss, No fever, No pruritis, No rectal bleeding, No tarry stools, No dysphagia,  :  No pain, bleeding, incontinence, nocturia  Muscle:  No pain, weakness  Neuro:  No weakness, tingling, memory problems  Psych:  No fatigue, insomnia, mood problems, depression  Endocrine:  No polyuria, polydipsia, cold/heat intolerance  Heme:  No petechiae, ecchymosis, easy bruisability  Skin:  No rash, tattoos, scars, edema      PHYSICAL EXAM:   Vital Signs:  Vital Signs Last 24 Hrs  T(C): 36.9 (18 Mar 2020 07:00), Max: 36.9 (18 Mar 2020 03:00)  T(F): 98.4 (18 Mar 2020 07:00), Max: 98.4 (18 Mar 2020 03:00)  HR: 74 (18 Mar 2020 08:00) (73 - 84)  BP: 104/58 (18 Mar 2020 08:00) (104/58 - 104/58)  BP(mean): 77 (18 Mar 2020 08:00) (77 - 77)  RR: 9 (18 Mar 2020 08:00) (7 - 22)  SpO2: 98% (18 Mar 2020 08:00) (93% - 100%)  Daily     Daily Weight in k (18 Mar 2020 00:17)    GENERAL:  NAD  HEENT:  NC/AT  CHEST:  Full & symmetric excursion, no increased effort, breath sounds clear  HEART:  Regular rhythm, S1, S2, no JVD  ABDOMEN:  Soft, non-tender, non-distended, normoactive bowel sounds  EXTREMITIES:  no cyanosis, clubbing or edema  SKIN:  Jaundiced  NEURO:  Alert, oriented    LABS:                        7.4    8.29  )-----------( 45       ( 18 Mar 2020 07:44 )             22.0     03-18    136  |  99  |  26<H>  ----------------------------<  158<H>  5.3   |  30  |  0.78    Ca    9.4      18 Mar 2020 07:44  Phos  2.5     03-18  Mg     1.9     03-18    TPro  4.9<L>  /  Alb  2.9<L>  /  TBili  8.2<H>  /  DBili  1.8<H>  /  AST  69<H>  /  ALT  40  /  AlkPhos  87  03-18    LIVER FUNCTIONS - ( 18 Mar 2020 00:35 )  Alb: 2.9 g/dL / Pro: 4.9 g/dL / ALK PHOS: 87 U/L / ALT: 40 U/L / AST: 69 U/L / GGT: x           PT/INR - ( 18 Mar 2020 00:35 )   PT: 27.1 sec;   INR: 2.32 ratio         PTT - ( 18 Mar 2020 00:35 )  PTT:56.7 sec        Imaging:

## 2020-03-18 NOTE — PROGRESS NOTE ADULT - ATTENDING COMMENTS
Hepatology Staff: Yomi Medina MD    I saw and examined the patient along with  Dr. Motta  03-19-20.    Patient Medical Record, hospital course was reviewed and summarized as below:    Vitals: Vital Signs Last 24 Hrs  T(C): 37 (19 Mar 2020 07:00), Max: 37 (19 Mar 2020 07:00)  T(F): 98.6 (19 Mar 2020 07:00), Max: 98.6 (19 Mar 2020 07:00)  HR: 77 (19 Mar 2020 07:00) (73 - 81)  BP: 108/57 (19 Mar 2020 07:00) (84/54 - 114/56)  BP(mean): 77 (19 Mar 2020 07:00) (64 - 88)  RR: 23 (19 Mar 2020 07:00) (8 - 41)  SpO2: 99% (19 Mar 2020 07:00) (93% - 100%)  Labs:Creatinine, Serum: 0.84 mg/dL (03-19-20 @ 06:55)  Creatinine, Serum: 0.92 mg/dL (03-18-20 @ 23:14)  Bilirubin Total, Serum: 8.2 mg/dL (03-18-20 @ 23:14)  INR: 2.12 ratio (03-18-20 @ 23:14)    I/O: I&O's Summary    18 Mar 2020 07:01  -  19 Mar 2020 07:00  --------------------------------------------------------  IN: 1580 mL / OUT: 3995 mL / NET: -2415 mL    19 Mar 2020 07:01  -  19 Mar 2020 08:54  --------------------------------------------------------  IN: 0 mL / OUT: 45 mL / NET: -45 mL      Nutritional Status:   Albumin, Serum: 3.0 g/dL (03-18-20 @ 23:14)    Last 24 hour events: Mild hypotensive episode, but afebrile.    Recommendations: Overall doing better, afebrile. Keep on current antibiotics (Ertapenem), might need longer course of antibiotics. We will discuss with ID.    Plan discussed with Primary team.

## 2020-03-18 NOTE — PROGRESS NOTE ADULT - SUBJECTIVE AND OBJECTIVE BOX
[facility] - Sushila Dhaliwal, Doug, Morenita, Ayo, Colleen Williamson  Office Number:  [phone]    Patient resting comfortably in bed in NAD.  Laying flat with no respiratory distress.  He is a bit lethargic.  He denies all cardiac symptoms.  Bp seems to have improved, and now off of IV pressor support > 24 hours    F/U for:  Hypotension    Telemetry:  NSR    MEDICATIONS  (STANDING):  chlorhexidine 2% Cloths 1 Application(s) Topical <User Schedule>  ertapenem  IVPB 1000 milliGRAM(s) IV Intermittent every 24 hours  folic acid 1 milliGRAM(s) Oral daily  insulin glargine Injectable (LANTUS) 15 Unit(s) SubCutaneous at bedtime  insulin lispro (HumaLOG) corrective regimen sliding scale   SubCutaneous three times a day before meals  insulin lispro (HumaLOG) corrective regimen sliding scale   SubCutaneous at bedtime  lactulose Syrup 30 Gram(s) Oral three times a day  midodrine. 30 milliGRAM(s) Oral every 8 hours  pantoprazole    Tablet 40 milliGRAM(s) Oral before breakfast  rifAXIMin 550 milliGRAM(s) Oral two times a day  sodium phosphate IVPB 15 milliMole(s) IV Intermittent once  tamsulosin 0.4 milliGRAM(s) Oral at bedtime    MEDICATIONS  (PRN):      Allergies    codeine (Anaphylaxis)    Intolerances        Vital Signs Last 24 Hrs  T(C): 36.9 (18 Mar 2020 03:00), Max: 36.9 (18 Mar 2020 03:00)  T(F): 98.4 (18 Mar 2020 03:00), Max: 98.4 (18 Mar 2020 03:00)  HR: 75 (18 Mar 2020 06:00) (73 - 84)  BP: 100/53 (17 Mar 2020 08:00) (100/53 - 100/53)  BP(mean): 74 (17 Mar 2020 08:00) (74 - 74)  RR: 16 (18 Mar 2020 06:00) (7 - 49)  SpO2: 98% (18 Mar 2020 06:00) (93% - 100%)    I&O's Summary    17 Mar 2020 07:01  -  18 Mar 2020 07:00  --------------------------------------------------------  IN: 766.6 mL / OUT: 2870 mL / NET: -2103.4 mL        ON EXAM:    Constitutional: well-nourished, well-developed, NAD   HEENT:  MMM, sclerae anicteric, conjunctivae clear, no oral cyanosis.  Pulmonary: Non-labored, breath sounds are clear bilaterally, No wheezing, rales or rhonchi  Cardiovascular: Regular, S1 and S2.  No murmur.  No rubs, gallops or clicks  Gastrointestinal: Bowel Sounds present, soft, dist, nontender.   Lymph: No peripheral edema.   Neurological: Alert, no focal deficits  Skin: No rashes.  Psych:  Mood & affect appropriate    LABS: All Labs Reviewed:                        7.3    9.82  )-----------( 45       ( 18 Mar 2020 00:35 )             22.0                         7.7    12.05 )-----------( 50       ( 17 Mar 2020 16:56 )             22.5                         7.4    16.53 )-----------( 48       ( 17 Mar 2020 05:24 )             21.7     18 Mar 2020 00:35    136    |  99     |  27     ----------------------------<  212    5.1     |  27     |  0.79   17 Mar 2020 19:21    136    |  98     |  26     ----------------------------<  179    5.3     |  26     |  0.75   17 Mar 2020 16:56    137    |  98     |  26     ----------------------------<  170    5.4     |  27     |  0.76     Ca    9.3        18 Mar 2020 00:35  Ca    9.3        17 Mar 2020 19:21  Ca    9.3        17 Mar 2020 16:56  Phos  2.8       18 Mar 2020 00:35  Phos  3.3       17 Mar 2020 16:56  Phos  3.3       17 Mar 2020 05:24  Mg     2.0       18 Mar 2020 00:35  Mg     2.1       17 Mar 2020 16:56  Mg     1.9       17 Mar 2020 05:24    TPro  4.9    /  Alb  2.9    /  TBili  8.2    /  DBili  1.8    /  AST  69     /  ALT  40     /  AlkPhos  87     18 Mar 2020 00:35  TPro  5.0    /  Alb  3.1    /  TBili  9.1    /  DBili  2.1    /  AST  78     /  ALT  45     /  AlkPhos  74     17 Mar 2020 16:56  TPro  4.6    /  Alb  2.8    /  TBili  8.7    /  DBili  1.8    /  AST  79     /  ALT  41     /  AlkPhos  63     17 Mar 2020 05:24    PT/INR - ( 18 Mar 2020 00:35 )   PT: 27.1 sec;   INR: 2.32 ratio         PTT - ( 18 Mar 2020 00:35 )  PTT:56.7 sec      Blood Culture: Organism --  Gram Stain Blood -- Gram Stain --  Specimen Source .Urine Clean Catch (Midstream)  Culture-Blood --    Organism --  Gram Stain Blood -- Gram Stain --  Specimen Source .Blood Blood-Peripheral  Culture-Blood --    Organism Escherichia coli ESBL  Gram Stain Blood -- Gram Stain --  Specimen Source .Abscess prostate abscess  Culture-Blood --

## 2020-03-18 NOTE — OCCUPATIONAL THERAPY INITIAL EVALUATION ADULT - PLANNED THERAPY INTERVENTIONS, OT EVAL
fine motor coordination training/ADL retraining/balance training/bed mobility training/strengthening/transfer training

## 2020-03-18 NOTE — PROGRESS NOTE ADULT - ASSESSMENT
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 He has had multiple recent hospitalizations, including hospitalization from 2/11/20-2/21/20 due to septic shock secondary to emphysematous cystitis and ESBL E. coli bacteremia and from 2/28/20-3/3/20 after a mechanical fall. He is currently re-admitted after another mechanical fall likely preceded by vasovagal syncope, with evidence of urosepsis as well as mild hepatic encephalopathy and mild MISA.    Impression:  #JEAN Cirrhosis: UNOS listed for liver transplant. MELD-Na 26 on 3/15  Decompensated by esophageal varices, ascites, h/o hepatic encephalopathy and h/o SBP.   -Ascites: Trace on CT abdomen/pelvis from 02/11/20  -Varices: Small non-bleeding EV on EGD from 12/2019.  -HCC: No focal liver lesions on MRI abdomen 12/2019.   -PSE: No asterixis.     #Recurrent urosepsis with  large prostatic abscess.  Emphesematous cystitis seen on during prior hospitalization with hx of ESBL Ecoli. CT scan during this admission showing enlarged prostate concerning for abscess. There is concern for possible colovesicular fistula.  Bcx from 3/10 negative  Ucx from 3/10 showing >100,000 ESBL Ecoli  CT pelvis with IV contrast showing diffusely enlarged prostate with extravasation of contrast concerning for fistula  s/p OR for I&D of prostate abscess on 3/15      #Recurrent falls: Differential includes deconditioning vs. diabetic neuropathy.   Previous work up from previous hospitalization:    -TTE unremarkable this admission, less likely cardiac cause. CT head without acute neurologic findings.   -Mental status at baseline on lactulose    Recommendations:  - urology consulted, s/p OR drainage, f/u recommendations  - cw with IV abx  - ID following, f/u recommendations  - trend CBC and fever curve daily  - Continue with midodrine 30mg TID  - Continue Lactulose and Rifaximin   - rest of plan as per primary team

## 2020-03-18 NOTE — OCCUPATIONAL THERAPY INITIAL EVALUATION ADULT - PERTINENT HX OF CURRENT PROBLEM, REHAB EVAL
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 syncope.

## 2020-03-18 NOTE — OCCUPATIONAL THERAPY INITIAL EVALUATION ADULT - FINE MOTOR COORDINATION TRAINING, OT EVAL
GOAL: Pt will increase speed/accuracy of finger to nose task with BUE 3/4 trials to increase participation in ADLs in 4 weeks

## 2020-03-18 NOTE — PROGRESS NOTE ADULT - SUBJECTIVE AND OBJECTIVE BOX
HISTORY OF PRESENT ILLNESS:  64M with pmh DM2, HLD, obesity, BPH, Orthostatic hypotension, GERD, HTN, Neuropathy, Obesity, HFpEF with mild LV diastolic dysfunction, and decompensated JEAN cirrhosis complicated by ascites, history of SBP, hepatic encephalopathy, and chronic anemia with a history of duodenal ulcer and duodenal AVM who is UNOS listed for liver transplantation at [facility] admitted with fall at home, general weakness/syncope. Collateral information obtained from chart review as patient is not communicative during evaluation. During admission patient had CT scan which showed diffuse low density appearance of the prostate concerning for abscess while patient reporting difficulty with urination but denying dysuria, urgency, frequency, gross hematuria, or rectal pain/pressure. Patient was take to OR with urology on 3/15/20 and underwent cystoscopy and transurethral drainage of prostatic abscess. Postop patient developed hypotension, altered mental status and bright red blood in bob bag. SICU consulted for evaluation. Vasopressor support was initiated with Levo and Vaso and MTP was activated, 4 units of FFP and 4units of PRBC were transfused, also received 1unit platelets and vitamin K.    24HOUR EVENTS:  - insulin gtt discontinued -> ISS  - midodrine 30mg po q8h started  - d/c standing albumin at present time  - cbc downtrending, now stabalized - trended q12h  - l. femoral aa. line d/c.     ----------  ALLERGIES:   codeine (Anaphylaxis)    ----------  VITAL SIGNS:  ICU Vital Signs Last 24 Hrs  T(C): 36.8 (17 Mar 2020 19:00), Max: 36.8 (17 Mar 2020 19:00)  T(F): 98.2 (17 Mar 2020 19:00), Max: 98.2 (17 Mar 2020 19:00)  HR: 80 (18 Mar 2020 00:00) (75 - 84)  BP: 100/53 (17 Mar 2020 08:00) (100/53 - 100/53)  BP(mean): 74 (17 Mar 2020 08:00) (74 - 74)  ABP: 119/53 (18 Mar 2020 00:00) (95/48 - 121/56)  ABP(mean): 78 (18 Mar 2020 00:00) (65 - 80)  RR: 16 (18 Mar 2020 00:00) (2 - 49)  SpO2: 100% (18 Mar 2020 00:00) (91% - 100%)    ----------  MEDICATIONS  (STANDING):  chlorhexidine 2% Cloths 1 Application(s) Topical <User Schedule>  ertapenem  IVPB 1000 milliGRAM(s) IV Intermittent every 24 hours  folic acid 1 milliGRAM(s) Oral daily  insulin glargine Injectable (LANTUS) 15 Unit(s) SubCutaneous at bedtime  insulin lispro (HumaLOG) corrective regimen sliding scale   SubCutaneous three times a day before meals  insulin lispro (HumaLOG) corrective regimen sliding scale   SubCutaneous at bedtime  lactulose Syrup 30 Gram(s) Oral three times a day  midodrine. 30 milliGRAM(s) Oral every 8 hours  pantoprazole    Tablet 40 milliGRAM(s) Oral before breakfast  rifAXIMin 550 milliGRAM(s) Oral two times a day  tamsulosin 0.4 milliGRAM(s) Oral at bedtime    ----------  NEURO    Exam: aox3. pain controlled. resting comfortably.         ----------  RESPIRATORY    ABG - ( 17 Mar 2020 16:43 )  pH, Arterial: 7.46  pH, Blood: x     /  pCO2: 43    /  pO2: 65    / HCO3: 30    / Base Excess: 5.8   /  SaO2: 94        Exam: respirations grossly unlabored on ra    ----------  CARDIOVASCULAR    midodrine. 30 milliGRAM(s) Oral every 8 hours  tamsulosin 0.4 milliGRAM(s) Oral at bedtime    ----------  GI/NUTRITION    Exam: s nt nd    Diet: Diet, Regular: Consistent Carbohydrate Evening Snack (CSTCHOSN) (03-17-20 @ 05:24)    lactulose Syrup 30 Gram(s) Oral three times a day  pantoprazole    Tablet 40 milliGRAM(s) Oral before breakfast    ----------  GENITOURINARY/RENAL    folic acid 1 milliGRAM(s) Oral daily    03-16 @ 07:01  -  03-17 @ 07:00  --------------------------------------------------------  IN:    Albumin 5%  - 250 mL: 500 mL    dextrose 5%: 1750 mL    insulin regular Infusion: 44 mL    insulin regular Infusion: 18 mL    IV PiggyBack: 849.7 mL    multiple electrolytes Injection Type 1multiple electrolytes Injection Type 1: 625 mL    norepinephrine Infusion: 90.8 mL    Oral Fluid: 150 mL    Packed Red Blood Cells: 350 mL  Total IN: 4377.5 mL    OUT:    Indwelling Catheter - Urethral: 2550 mL  Total OUT: 2550 mL    Total NET: 1827.5 mL    03-17 @ 07:01  -  03-18 @ 00:37  --------------------------------------------------------  IN:    Albumin 5%  - 250 mL: 250 mL    IV PiggyBack: 216.6 mL  Total IN: 466.6 mL    OUT:    Indwelling Catheter - Urethral: 1795 mL  Total OUT: 1795 mL    Total NET: -1328.4 mL    03-17    136  |  98  |  26<H>  ----------------------------<  179<H>  5.3   |  26  |  0.75    Ca    9.3      17 Mar 2020 19:21  Phos  3.3     03-17  Mg     2.1     03-17    TPro  5.0<L>  /  Alb  3.1<L>  /  TBili  9.1<H>  /  DBili  2.1<H>  /  AST  78<H>  /  ALT  45  /  AlkPhos  74  03-17    [x ] Bob catheter, indication: urine output monitoring in critically ill patient    ----------  HEMATOLOGIC                        7.7    12.05 )-----------( 50       ( 17 Mar 2020 16:56 )             22.5     PT/INR - ( 17 Mar 2020 16:56 )   PT: 27.8 sec;   INR: 2.37 ratio    PTT - ( 17 Mar 2020 16:56 )  PTT:58.2 sec    Transfusion: [ ] PRBC	[ ] Platelets	[ ] FFP	[ ] Cryoprecipitate    ----------  INFECTIOUS DISEASES    UOP bloody in bob bag.     ertapenem  IVPB 1000 milliGRAM(s) IV Intermittent every 24 hours  rifAXIMin 550 milliGRAM(s) Oral two times a day                          7.7    12.05 )-----------( 50       ( 17 Mar 2020 16:56 )             22.5     Culture - Urine (collected 16 Mar 2020 04:48)  Source: .Urine Clean Catch (Midstream)  Preliminary Report (17 Mar 2020 10:40):    Insufficient growth-culture reincubated    Culture - Blood (collected 16 Mar 2020 01:53)  Source: .Blood Blood  Preliminary Report (17 Mar 2020 02:06):    No growth to date.    Culture - Blood (collected 16 Mar 2020 01:53)  Source: .Blood Blood-Peripheral  Preliminary Report (17 Mar 2020 02:06):    No growth to date.    Culture - Abscess with Gram Stain (collected 15 Mar 2020 17:24)  Source: .Abscess prostate abscess  Preliminary Report (17 Mar 2020 09:46):    Moderate Escherichia coli ESBL  Organism: Escherichia coli ESBL (17 Mar 2020 09:44)  Organism: Escherichia coli ESBL (17 Mar 2020 09:44)    ----------  ENDOCRINE    insulin glargine Injectable (LANTUS) 15 Unit(s) SubCutaneous at bedtime  insulin lispro (HumaLOG) corrective regimen sliding scale   SubCutaneous three times a day before meals  insulin lispro (HumaLOG) corrective regimen sliding scale   SubCutaneous at bedtime    CAPILLARY BLOOD GLUCOSE  POCT Blood Glucose.: 212 mg/dL (17 Mar 2020 22:32)  POCT Blood Glucose.: 174 mg/dL (17 Mar 2020 16:35)  POCT Blood Glucose.: 139 mg/dL (17 Mar 2020 12:28)  POCT Blood Glucose.: 95 mg/dL (17 Mar 2020 08:10)  POCT Blood Glucose.: 83 mg/dL (17 Mar 2020 03:38)  POCT Blood Glucose.: 82 mg/dL (17 Mar 2020 01:31)    ----------    PATIENT CARE ACCESS DEVICES:  [ ] Peripheral IV  [ ] Central Venous Line	[ ] R	[ ] L	[ ] IJ	[ ] Fem	[ ] SC	Placed:   [x] Arterial Line		        [ ] R	[x] L	[x] Fem	[ ] Rad	[ ] Ax  [ ] PICC:					[ ] Mediport  [x] Urinary Catheter,  [x] Necessity of urinary, arterial, and venous catheters discussed    ----------    OTHER MEDICATIONS:   chlorhexidine 2% Cloths 1 Application(s) Topical <User Schedule>    ----------  IMAGING STUDIES:    No new radiograph imaging for review. HISTORY OF PRESENT ILLNESS:  64M with pmh DM2, HLD, obesity, BPH, Orthostatic hypotension, GERD, HTN, Neuropathy, Obesity, HFpEF with mild LV diastolic dysfunction, and decompensated JEAN cirrhosis complicated by ascites, history of SBP, hepatic encephalopathy, and chronic anemia with a history of duodenal ulcer and duodenal AVM who is UNOS listed for liver transplantation at [facility] admitted with fall at home, general weakness/syncope. Collateral information obtained from chart review as patient is not communicative during evaluation. During admission patient had CT scan which showed diffuse low density appearance of the prostate concerning for abscess while patient reporting difficulty with urination but denying dysuria, urgency, frequency, gross hematuria, or rectal pain/pressure. Patient was take to OR with urology on 3/15/20 and underwent cystoscopy and transurethral drainage of prostatic abscess. Postop patient developed hypotension, altered mental status and bright red blood in bob bag. SICU consulted for evaluation. Vasopressor support was initiated with Levo and Vaso and MTP was activated, 4 units of FFP and 4units of PRBC were transfused, also received 1unit platelets and vitamin K.    24HOUR EVENTS:  - insulin gtt discontinued -> ISS  - midodrine 30mg po q8h started  - d/c standing albumin at present time  - cbc downtrending, now stabalized - trended q12h      ----------  ALLERGIES:   codeine (Anaphylaxis)    ----------  VITAL SIGNS:  ICU Vital Signs Last 24 Hrs  T(C): 36.8 (17 Mar 2020 19:00), Max: 36.8 (17 Mar 2020 19:00)  T(F): 98.2 (17 Mar 2020 19:00), Max: 98.2 (17 Mar 2020 19:00)  HR: 80 (18 Mar 2020 00:00) (75 - 84)  BP: 100/53 (17 Mar 2020 08:00) (100/53 - 100/53)  BP(mean): 74 (17 Mar 2020 08:00) (74 - 74)  ABP: 119/53 (18 Mar 2020 00:00) (95/48 - 121/56)  ABP(mean): 78 (18 Mar 2020 00:00) (65 - 80)  RR: 16 (18 Mar 2020 00:00) (2 - 49)  SpO2: 100% (18 Mar 2020 00:00) (91% - 100%)    ----------  MEDICATIONS  (STANDING):  chlorhexidine 2% Cloths 1 Application(s) Topical <User Schedule>  ertapenem  IVPB 1000 milliGRAM(s) IV Intermittent every 24 hours  folic acid 1 milliGRAM(s) Oral daily  insulin glargine Injectable (LANTUS) 15 Unit(s) SubCutaneous at bedtime  insulin lispro (HumaLOG) corrective regimen sliding scale   SubCutaneous three times a day before meals  insulin lispro (HumaLOG) corrective regimen sliding scale   SubCutaneous at bedtime  lactulose Syrup 30 Gram(s) Oral three times a day  midodrine. 30 milliGRAM(s) Oral every 8 hours  pantoprazole    Tablet 40 milliGRAM(s) Oral before breakfast  rifAXIMin 550 milliGRAM(s) Oral two times a day  tamsulosin 0.4 milliGRAM(s) Oral at bedtime    ----------  NEURO    Exam: aox3. pain controlled. resting comfortably.         ----------  RESPIRATORY    ABG - ( 17 Mar 2020 16:43 )  pH, Arterial: 7.46  pH, Blood: x     /  pCO2: 43    /  pO2: 65    / HCO3: 30    / Base Excess: 5.8   /  SaO2: 94        Exam: respirations grossly unlabored on ra    ----------  CARDIOVASCULAR    midodrine. 30 milliGRAM(s) Oral every 8 hours  tamsulosin 0.4 milliGRAM(s) Oral at bedtime    ----------  GI/NUTRITION    Exam: s nt nd    Diet: Diet, Regular: Consistent Carbohydrate Evening Snack (CSTCHOSN) (03-17-20 @ 05:24)    lactulose Syrup 30 Gram(s) Oral three times a day  pantoprazole    Tablet 40 milliGRAM(s) Oral before breakfast    ----------  GENITOURINARY/RENAL    folic acid 1 milliGRAM(s) Oral daily    03-16 @ 07:01  -  03-17 @ 07:00  --------------------------------------------------------  IN:    Albumin 5%  - 250 mL: 500 mL    dextrose 5%: 1750 mL    insulin regular Infusion: 44 mL    insulin regular Infusion: 18 mL    IV PiggyBack: 849.7 mL    multiple electrolytes Injection Type 1multiple electrolytes Injection Type 1: 625 mL    norepinephrine Infusion: 90.8 mL    Oral Fluid: 150 mL    Packed Red Blood Cells: 350 mL  Total IN: 4377.5 mL    OUT:    Indwelling Catheter - Urethral: 2550 mL  Total OUT: 2550 mL    Total NET: 1827.5 mL    03-17 @ 07:01  -  03-18 @ 00:37  --------------------------------------------------------  IN:    Albumin 5%  - 250 mL: 250 mL    IV PiggyBack: 216.6 mL  Total IN: 466.6 mL    OUT:    Indwelling Catheter - Urethral: 1795 mL  Total OUT: 1795 mL    Total NET: -1328.4 mL    03-17    136  |  98  |  26<H>  ----------------------------<  179<H>  5.3   |  26  |  0.75    Ca    9.3      17 Mar 2020 19:21  Phos  3.3     03-17  Mg     2.1     03-17    TPro  5.0<L>  /  Alb  3.1<L>  /  TBili  9.1<H>  /  DBili  2.1<H>  /  AST  78<H>  /  ALT  45  /  AlkPhos  74  03-17    [x ] Bob catheter, indication: urine output monitoring in critically ill patient    ----------  HEMATOLOGIC                        7.7    12.05 )-----------( 50       ( 17 Mar 2020 16:56 )             22.5     PT/INR - ( 17 Mar 2020 16:56 )   PT: 27.8 sec;   INR: 2.37 ratio    PTT - ( 17 Mar 2020 16:56 )  PTT:58.2 sec    Transfusion: [ ] PRBC	[ ] Platelets	[ ] FFP	[ ] Cryoprecipitate    ----------  INFECTIOUS DISEASES    UOP bloody in bob bag.     ertapenem  IVPB 1000 milliGRAM(s) IV Intermittent every 24 hours  rifAXIMin 550 milliGRAM(s) Oral two times a day                          7.7    12.05 )-----------( 50       ( 17 Mar 2020 16:56 )             22.5     Culture - Urine (collected 16 Mar 2020 04:48)  Source: .Urine Clean Catch (Midstream)  Preliminary Report (17 Mar 2020 10:40):    Insufficient growth-culture reincubated    Culture - Blood (collected 16 Mar 2020 01:53)  Source: .Blood Blood  Preliminary Report (17 Mar 2020 02:06):    No growth to date.    Culture - Blood (collected 16 Mar 2020 01:53)  Source: .Blood Blood-Peripheral  Preliminary Report (17 Mar 2020 02:06):    No growth to date.    Culture - Abscess with Gram Stain (collected 15 Mar 2020 17:24)  Source: .Abscess prostate abscess  Preliminary Report (17 Mar 2020 09:46):    Moderate Escherichia coli ESBL  Organism: Escherichia coli ESBL (17 Mar 2020 09:44)  Organism: Escherichia coli ESBL (17 Mar 2020 09:44)    ----------  ENDOCRINE    insulin glargine Injectable (LANTUS) 15 Unit(s) SubCutaneous at bedtime  insulin lispro (HumaLOG) corrective regimen sliding scale   SubCutaneous three times a day before meals  insulin lispro (HumaLOG) corrective regimen sliding scale   SubCutaneous at bedtime    CAPILLARY BLOOD GLUCOSE  POCT Blood Glucose.: 212 mg/dL (17 Mar 2020 22:32)  POCT Blood Glucose.: 174 mg/dL (17 Mar 2020 16:35)  POCT Blood Glucose.: 139 mg/dL (17 Mar 2020 12:28)  POCT Blood Glucose.: 95 mg/dL (17 Mar 2020 08:10)  POCT Blood Glucose.: 83 mg/dL (17 Mar 2020 03:38)  POCT Blood Glucose.: 82 mg/dL (17 Mar 2020 01:31)    ----------    PATIENT CARE ACCESS DEVICES:  [ ] Peripheral IV  [ ] Central Venous Line	[ ] R	[ ] L	[ ] IJ	[ ] Fem	[ ] SC	Placed:   [x] Arterial Line		        [ ] R	[x] L	[x] Fem	[ ] Rad	[ ] Ax  [ ] PICC:					[ ] Mediport  [x] Urinary Catheter,  [x] Necessity of urinary, arterial, and venous catheters discussed    ----------    OTHER MEDICATIONS:   chlorhexidine 2% Cloths 1 Application(s) Topical <User Schedule>    ----------  IMAGING STUDIES:    No new radiograph imaging for review. HISTORY OF PRESENT ILLNESS:  64M with pmh DM2, HLD, obesity, BPH, Orthostatic hypotension, GERD, HTN, Neuropathy, Obesity, HFpEF with mild LV diastolic dysfunction, and decompensated JAEN cirrhosis complicated by ascites, history of SBP, hepatic encephalopathy, and chronic anemia with a history of duodenal ulcer and duodenal AVM who is UNOS listed for liver transplantation at [facility] admitted with fall at home, general weakness/syncope. Collateral information obtained from chart review as patient is not communicative during evaluation. During admission patient had CT scan which showed diffuse low density appearance of the prostate concerning for abscess while patient reporting difficulty with urination but denying dysuria, urgency, frequency, gross hematuria, or rectal pain/pressure. Patient was take to OR with urology on 3/15/20 and underwent cystoscopy and transurethral drainage of prostatic abscess. Postop patient developed hypotension, altered mental status and bright red blood in bob bag. SICU consulted for evaluation. Vasopressor support was initiated with Levo and Vaso and MTP was activated, 4 units of FFP and 4units of PRBC were transfused, also received 1unit platelets and vitamin K.    24HOUR EVENTS:  - insulin gtt discontinued -> ISS  - midodrine 30mg po q8h started  - d/c standing albumin at present time  - cbc downtrending, now stabalized - trended q12h  - Femoral cordis removed    ----------  ALLERGIES:   codeine (Anaphylaxis)    ----------  VITAL SIGNS:  ICU Vital Signs Last 24 Hrs  T(C): 36.8 (17 Mar 2020 19:00), Max: 36.8 (17 Mar 2020 19:00)  T(F): 98.2 (17 Mar 2020 19:00), Max: 98.2 (17 Mar 2020 19:00)  HR: 80 (18 Mar 2020 00:00) (75 - 84)  BP: 100/53 (17 Mar 2020 08:00) (100/53 - 100/53)  BP(mean): 74 (17 Mar 2020 08:00) (74 - 74)  ABP: 119/53 (18 Mar 2020 00:00) (95/48 - 121/56)  ABP(mean): 78 (18 Mar 2020 00:00) (65 - 80)  RR: 16 (18 Mar 2020 00:00) (2 - 49)  SpO2: 100% (18 Mar 2020 00:00) (91% - 100%)    ----------  MEDICATIONS  (STANDING):  chlorhexidine 2% Cloths 1 Application(s) Topical <User Schedule>  ertapenem  IVPB 1000 milliGRAM(s) IV Intermittent every 24 hours  folic acid 1 milliGRAM(s) Oral daily  insulin glargine Injectable (LANTUS) 15 Unit(s) SubCutaneous at bedtime  insulin lispro (HumaLOG) corrective regimen sliding scale   SubCutaneous three times a day before meals  insulin lispro (HumaLOG) corrective regimen sliding scale   SubCutaneous at bedtime  lactulose Syrup 30 Gram(s) Oral three times a day  midodrine. 30 milliGRAM(s) Oral every 8 hours  pantoprazole    Tablet 40 milliGRAM(s) Oral before breakfast  rifAXIMin 550 milliGRAM(s) Oral two times a day  tamsulosin 0.4 milliGRAM(s) Oral at bedtime    ----------  NEURO    Exam: aox3. pain controlled. resting comfortably.         ----------  RESPIRATORY    ABG - ( 17 Mar 2020 16:43 )  pH, Arterial: 7.46  pH, Blood: x     /  pCO2: 43    /  pO2: 65    / HCO3: 30    / Base Excess: 5.8   /  SaO2: 94        Exam: respirations grossly unlabored on ra    ----------  CARDIOVASCULAR    midodrine. 30 milliGRAM(s) Oral every 8 hours  tamsulosin 0.4 milliGRAM(s) Oral at bedtime    ----------  GI/NUTRITION    Exam: s nt nd    Diet: Diet, Regular: Consistent Carbohydrate Evening Snack (CSTCHOSN) (03-17-20 @ 05:24)    lactulose Syrup 30 Gram(s) Oral three times a day  pantoprazole    Tablet 40 milliGRAM(s) Oral before breakfast    ----------  GENITOURINARY/RENAL    folic acid 1 milliGRAM(s) Oral daily    03-16 @ 07:01  -  03-17 @ 07:00  --------------------------------------------------------  IN:    Albumin 5%  - 250 mL: 500 mL    dextrose 5%: 1750 mL    insulin regular Infusion: 44 mL    insulin regular Infusion: 18 mL    IV PiggyBack: 849.7 mL    multiple electrolytes Injection Type 1multiple electrolytes Injection Type 1: 625 mL    norepinephrine Infusion: 90.8 mL    Oral Fluid: 150 mL    Packed Red Blood Cells: 350 mL  Total IN: 4377.5 mL    OUT:    Indwelling Catheter - Urethral: 2550 mL  Total OUT: 2550 mL    Total NET: 1827.5 mL    03-17 @ 07:01  -  03-18 @ 00:37  --------------------------------------------------------  IN:    Albumin 5%  - 250 mL: 250 mL    IV PiggyBack: 216.6 mL  Total IN: 466.6 mL    OUT:    Indwelling Catheter - Urethral: 1795 mL  Total OUT: 1795 mL    Total NET: -1328.4 mL    03-17    136  |  98  |  26<H>  ----------------------------<  179<H>  5.3   |  26  |  0.75    Ca    9.3      17 Mar 2020 19:21  Phos  3.3     03-17  Mg     2.1     03-17    TPro  5.0<L>  /  Alb  3.1<L>  /  TBili  9.1<H>  /  DBili  2.1<H>  /  AST  78<H>  /  ALT  45  /  AlkPhos  74  03-17    [x ] Bob catheter, indication: urine output monitoring in critically ill patient    ----------  HEMATOLOGIC                        7.7    12.05 )-----------( 50       ( 17 Mar 2020 16:56 )             22.5     PT/INR - ( 17 Mar 2020 16:56 )   PT: 27.8 sec;   INR: 2.37 ratio    PTT - ( 17 Mar 2020 16:56 )  PTT:58.2 sec    Transfusion: [ ] PRBC	[ ] Platelets	[ ] FFP	[ ] Cryoprecipitate    ----------  INFECTIOUS DISEASES    UOP bloody in bob bag.     ertapenem  IVPB 1000 milliGRAM(s) IV Intermittent every 24 hours  rifAXIMin 550 milliGRAM(s) Oral two times a day                          7.7    12.05 )-----------( 50       ( 17 Mar 2020 16:56 )             22.5     Culture - Urine (collected 16 Mar 2020 04:48)  Source: .Urine Clean Catch (Midstream)  Preliminary Report (17 Mar 2020 10:40):    Insufficient growth-culture reincubated    Culture - Blood (collected 16 Mar 2020 01:53)  Source: .Blood Blood  Preliminary Report (17 Mar 2020 02:06):    No growth to date.    Culture - Blood (collected 16 Mar 2020 01:53)  Source: .Blood Blood-Peripheral  Preliminary Report (17 Mar 2020 02:06):    No growth to date.    Culture - Abscess with Gram Stain (collected 15 Mar 2020 17:24)  Source: .Abscess prostate abscess  Preliminary Report (17 Mar 2020 09:46):    Moderate Escherichia coli ESBL  Organism: Escherichia coli ESBL (17 Mar 2020 09:44)  Organism: Escherichia coli ESBL (17 Mar 2020 09:44)    ----------  ENDOCRINE    insulin glargine Injectable (LANTUS) 15 Unit(s) SubCutaneous at bedtime  insulin lispro (HumaLOG) corrective regimen sliding scale   SubCutaneous three times a day before meals  insulin lispro (HumaLOG) corrective regimen sliding scale   SubCutaneous at bedtime    CAPILLARY BLOOD GLUCOSE  POCT Blood Glucose.: 212 mg/dL (17 Mar 2020 22:32)  POCT Blood Glucose.: 174 mg/dL (17 Mar 2020 16:35)  POCT Blood Glucose.: 139 mg/dL (17 Mar 2020 12:28)  POCT Blood Glucose.: 95 mg/dL (17 Mar 2020 08:10)  POCT Blood Glucose.: 83 mg/dL (17 Mar 2020 03:38)  POCT Blood Glucose.: 82 mg/dL (17 Mar 2020 01:31)    ----------    PATIENT CARE ACCESS DEVICES:  [ ] Peripheral IV  [ ] Central Venous Line	[ ] R	[ ] L	[ ] IJ	[ ] Fem	[ ] SC	Placed:   [x] Arterial Line		        [ ] R	[x] L	[x] Fem	[ ] Rad	[ ] Ax  [ ] PICC:					[ ] Mediport  [x] Urinary Catheter,  [x] Necessity of urinary, arterial, and venous catheters discussed    ----------    OTHER MEDICATIONS:   chlorhexidine 2% Cloths 1 Application(s) Topical <User Schedule>    ----------  IMAGING STUDIES:    No new radiograph imaging for review.

## 2020-03-18 NOTE — OCCUPATIONAL THERAPY INITIAL EVALUATION ADULT - LIVES WITH, PROFILE
no chest pain and no edema. Pt lives with spouse and children in a pvt home, 3 LITA. Pt has a tub shower and shower chair, utilizing RW.

## 2020-03-18 NOTE — PROGRESS NOTE ADULT - ASSESSMENT
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 syncope in setting of UTI/abscess.  He is s/p TURP, and now gross hematuria.  Had AMS and hypotension overnight, likely a component of both septic and hypovolemic shock.  s/p mm blood products, and not on IV pressor support    - Continue to trend H/H and transfuse as needed  - pressors weaned yesterday, watch bp carefully.  Continues at risk for hypotension  - Continue midodrine 30 TID  - Hold diuretics as not vol ol  - I doubt this was arrhythmic but his prolonged QTC has been concerning. This was seen on his previous admission as well  - Avoid QTC prolonging drugs  - Monitor and replete electrolytes. Keep K>4.0 and Mg>2.0. Replete Mg today  - cont abx  - check daily ekg    - No signs of significant ischemia. Cath with non obs disease  - echo in 2/2020 with normal LV function. No need to repeat.   - urology for TURP/abscess drainage today    - Further cardiac workup will depend on clinical course.   - All other workup per primary team. Will followup.

## 2020-03-18 NOTE — PROGRESS NOTE ADULT - SUBJECTIVE AND OBJECTIVE BOX
Follow Up:      Interval History:    REVIEW OF SYSTEMS  [  ] ROS unobtainable because:    [  ] All other systems negative except as noted below    Constitutional:  [ ] fever [ ] chills  [ ] weight loss  [ ] weakness  Skin:  [ ] rash [ ] phlebitis	  Eyes: [ ] icterus [ ] pain  [ ] discharge	  ENMT: [ ] sore throat  [ ] thrush [ ] ulcers [ ] exudates  Respiratory: [ ] dyspnea [ ] hemoptysis [ ] cough [ ] sputum	  Cardiovascular:  [ ] chest pain [ ] palpitations [ ] edema	  Gastrointestinal:  [ ] nausea [ ] vomiting [ ] diarrhea [ ] constipation [ ] pain	  Genitourinary:  [ ] dysuria [ ] frequency [ ] hematuria [ ] discharge [ ] flank pain  [ ] incontinence  Musculoskeletal:  [ ] myalgias [ ] arthralgias [ ] arthritis  [ ] back pain  Neurological:  [ ] headache [ ] seizures  [ ] confusion/altered mental status    Allergies  codeine (Anaphylaxis)        ANTIMICROBIALS:  ertapenem  IVPB 1000 every 24 hours  rifAXIMin 550 two times a day      OTHER MEDS:  MEDICATIONS  (STANDING):  insulin glargine Injectable (LANTUS) 15 at bedtime  insulin lispro (HumaLOG) corrective regimen sliding scale  three times a day before meals  insulin lispro (HumaLOG) corrective regimen sliding scale  at bedtime  lactulose Syrup 30 three times a day  midodrine. 30 every 8 hours  pantoprazole    Tablet 40 before breakfast  tamsulosin 0.4 at bedtime      Vital Signs Last 24 Hrs  T(C): 36.7 (18 Mar 2020 19:00), Max: 36.9 (18 Mar 2020 03:00)  T(F): 98 (18 Mar 2020 19:00), Max: 98.5 (18 Mar 2020 11:00)  HR: 77 (18 Mar 2020 19:00) (73 - 81)  BP: 109/56 (18 Mar 2020 19:00) (84/54 - 114/56)  BP(mean): 77 (18 Mar 2020 19:00) (64 - 78)  RR: 12 (18 Mar 2020 19:00) (8 - 41)  SpO2: 98% (18 Mar 2020 19:00) (93% - 100%)    PHYSICAL EXAMINATION:  General: Alert and Awake, NAD  HEENT: PERRL, EOMI  Neck: Supple  Cardiac: RRR, No M/R/G  Resp: CTAB, No Wh/Rh/Ra  Abdomen: NBS, NT/ND, No HSM, No rigidity or guarding  MSK: No LE edema. No Calf tenderness  : No bob  Skin: No rashes or lesions. Skin is warm and dry to the touch.   Neuro: Alert and Awake. CN 2-12 Grossly intact. Moves all four extremities spontaneously.  Psych: Calm, Pleasant, Cooperative                          7.4    8.29  )-----------( 45       ( 18 Mar 2020 07:44 )             22.0       03-18    136  |  99  |  26<H>  ----------------------------<  158<H>  5.3   |  30  |  0.78    Ca    9.4      18 Mar 2020 07:44  Phos  2.5     03-18  Mg     1.9     03-18    TPro  4.9<L>  /  Alb  2.9<L>  /  TBili  8.2<H>  /  DBili  1.8<H>  /  AST  69<H>  /  ALT  40  /  AlkPhos  87  03-18          MICROBIOLOGY:  v  .Urine Clean Catch (Midstream)  03-16-20   Insufficient growth-culture reincubated  --  --      .Blood Blood-Peripheral  03-16-20   No growth to date.  --  --      .Abscess prostate abscess  03-15-20   Moderate Escherichia coli ESBL  --  Escherichia coli ESBL      .Urine Clean Catch (Midstream)  03-10-20   >100,000 CFU/ml Escherichia coli ESBL  --  Escherichia coli ESBL      .Blood Blood-Venous  03-10-20   No growth at 5 days.  --  --      .Blood Blood-Peripheral  03-10-20   No growth at 5 days.  --  --        CMV IgG Antibody: <0.20 U/mL (12-04-19 @ 08:33)  Toxoplasma IgG Screen: <3.0 IU/mL (12-04-19 @ 08:33)          RADIOLOGY:    <The imaging below has been reviewed and visualized by me independently. Findings as detailed in report below> Follow Up:  Prostate Abscess    Interval History: afebrile, no pressors. no pain today    REVIEW OF SYSTEMS  [  ] ROS unobtainable because:    [ x ] All other systems negative except as noted below    Constitutional:  [ ] fever [ ] chills  [ ] weight loss  [ ] weakness  Skin:  [ ] rash [ ] phlebitis	  Eyes: [ ] icterus [ ] pain  [ ] discharge	  ENMT: [ ] sore throat  [ ] thrush [ ] ulcers [ ] exudates  Respiratory: [ ] dyspnea [ ] hemoptysis [ ] cough [ ] sputum	  Cardiovascular:  [ ] chest pain [ ] palpitations [ ] edema	  Gastrointestinal:  [ ] nausea [ ] vomiting [ ] diarrhea [ ] constipation [ ] pain	  Genitourinary:  [ ] dysuria [ ] frequency [ ] hematuria [ ] discharge [ ] flank pain  [ ] incontinence  Musculoskeletal:  [ ] myalgias [ ] arthralgias [ ] arthritis  [ ] back pain  Neurological:  [ ] headache [ ] seizures  [ ] confusion/altered mental status    Allergies  codeine (Anaphylaxis)        ANTIMICROBIALS:  ertapenem  IVPB 1000 every 24 hours  rifAXIMin 550 two times a day      OTHER MEDS:  MEDICATIONS  (STANDING):  insulin glargine Injectable (LANTUS) 15 at bedtime  insulin lispro (HumaLOG) corrective regimen sliding scale  three times a day before meals  insulin lispro (HumaLOG) corrective regimen sliding scale  at bedtime  lactulose Syrup 30 three times a day  midodrine. 30 every 8 hours  pantoprazole    Tablet 40 before breakfast  tamsulosin 0.4 at bedtime      Vital Signs Last 24 Hrs  T(C): 36.7 (18 Mar 2020 19:00), Max: 36.9 (18 Mar 2020 03:00)  T(F): 98 (18 Mar 2020 19:00), Max: 98.5 (18 Mar 2020 11:00)  HR: 77 (18 Mar 2020 19:00) (73 - 81)  BP: 109/56 (18 Mar 2020 19:00) (84/54 - 114/56)  BP(mean): 77 (18 Mar 2020 19:00) (64 - 78)  RR: 12 (18 Mar 2020 19:00) (8 - 41)  SpO2: 98% (18 Mar 2020 19:00) (93% - 100%)    PHYSICAL EXAMINATION:  General: Jaundice, Alert and Awake, NAD  HEENT: PERRL, EOMI, Scleral Icterus  Neck: Supple  Cardiac: RRR, No M/R/G  Resp: CTAB, No Wh/Rh/Ra  Abdomen: NBS, NT/ND, No HSM, No rigidity or guarding  MSK: No LE edema. No Calf tenderness  : + bob with bloody drainaghe  Skin: No rashes or lesions. Skin is warm and dry to the touch.   Neuro: Alert and Awake. CN 2-12 Grossly intact. Moves all four extremities spontaneously.  Psych: Calm, Pleasant, Cooperative                          7.4    8.29  )-----------( 45       ( 18 Mar 2020 07:44 )             22.0       03-18    136  |  99  |  26<H>  ----------------------------<  158<H>  5.3   |  30  |  0.78    Ca    9.4      18 Mar 2020 07:44  Phos  2.5     03-18  Mg     1.9     03-18    TPro  4.9<L>  /  Alb  2.9<L>  /  TBili  8.2<H>  /  DBili  1.8<H>  /  AST  69<H>  /  ALT  40  /  AlkPhos  87  03-18          MICROBIOLOGY:  v  .Urine Clean Catch (Midstream)  03-16-20   Insufficient growth-culture reincubated  --  --      .Blood Blood-Peripheral  03-16-20   No growth to date.  --  --      .Abscess prostate abscess  03-15-20   Moderate Escherichia coli ESBL  --  Escherichia coli ESBL      .Urine Clean Catch (Midstream)  03-10-20   >100,000 CFU/ml Escherichia coli ESBL  --  Escherichia coli ESBL      .Blood Blood-Venous  03-10-20   No growth at 5 days.  --  --      .Blood Blood-Peripheral  03-10-20   No growth at 5 days.  --  --        CMV IgG Antibody: <0.20 U/mL (12-04-19 @ 08:33)  Toxoplasma IgG Screen: <3.0 IU/mL (12-04-19 @ 08:33)          RADIOLOGY:    <The imaging below has been reviewed and visualized by me independently. Findings as detailed in report below>    EXAM:  CT PELVIS ONLY                        EXAM:  CT ABDOMEN AND PELVIS OC IC                        PROCEDURE DATE:  03/12/2020    Prostate gland is again noted to be enlarged with diffusely low attenuation appearance, concerning for underlying abscess. Following instillation of contrast into the urinary bladder, there is evidence of extraluminal contrast tracking along the anterior aspect of the prostate gland. This may be secondary to fistulous communication with either the posterior wall of the urinary bladder or the urethra.  Diffusely thickened wall of the urinary bladder with mild infiltration of the perivesicle fat.  Cirrhotic configuration of the liver with evidence of portal hypertension and multiple varices throughout the abdomen.  Nonobstructing 0.7 cm left intrarenal calculus.

## 2020-03-18 NOTE — CHART NOTE - NSCHARTNOTEFT_GEN_A_CORE
Reviewed FS data over past 24 hours. Glucose now at goal on Lantus + correctional scale    Plan:  Type 2 Diabetes:  -test BG AC/HS  -c/w Lantus 15 units QHS  -c/w Humalog moderate correction scale AC and mod HS scale  -Hold off on premeal for now   -Discharge on basal/bolus  -f/u outpt w/Dr Goldberg    Adrenal insufficiency.  Plan: patient had low cortisol levels  -he has multiple acute medical issues requiring ICU care  -corticosteroid binding globulin pending 3/15.     Due to [facility] policy during the evolving novel coronavirus outbreak, efforts are being made to limit unnecessary patient contacts to limit the spread of disease.   Accordingly, patients without clear indication for physical exam or face to face interview from the Endocrine team will be adjusted in conjunction with conversations with primary provider team. This is being done for the safety of all the patients we care for.     Please contact Endocrine team with any questions.   pager: [phone]  Cell: [phone]  Office: [phone]

## 2020-03-18 NOTE — PROGRESS NOTE ADULT - ASSESSMENT
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   Recent admission for ESBL E coli Bacteremia and UTI  Patient found to have pyuria on U/A and urine with visible thick sediment  CT A/P (reviewed with radiology) with significant interval increase in prostate size from last month (concerning for abscess)  Could not tolerate MRI  CT A/P (with PO/IV contrast) without concrete abscess but suspicious appearing prostate. Also with fistulous communication with either the posterior wall of the urinary bladder or the urethra.    On 3/15 underwent drainage or prostate abscess   Worsening leukocytosis and septic shock after drainage  Anticipate may have had transient bacteremia with prostate manipulation  Prostate Abscess Cultures with ESBL E coli  Can deescalate to Ertapenem    Given prostatitis/UTI/abscess patient is not cleared from ID perspective at this time for OLT    Overall, UTI, Prostate Abscess, Leukocytosis, Septic Shock, Positive Culture Finding (UCx), Pre-OLT    --Continue Ertapenem 1g IV Q24H  --Continue to follow CBC with diff  --Continue to follow renal function (Cr/BUN)  --Continue to follow temperature curve  --Follow up on preliminary blood cultures  --Urology Recommendations noted    I will continue to follow. Please feel free to contact me with any further questions.    Eusebio Pérez M.D.  [facility] Division of Infectious Disease  8AM-5PM: Pager Number [phone]  After Hours (or if no response): Please contact the Infectious Diseases Office at [phone]

## 2020-03-19 DIAGNOSIS — N41.2 ABSCESS OF PROSTATE: ICD-10-CM

## 2020-03-19 LAB
ALBUMIN SERPL ELPH-MCNC: 3 G/DL — LOW (ref 3.3–5)
ALP SERPL-CCNC: 116 U/L — SIGNIFICANT CHANGE UP (ref 40–120)
ALT FLD-CCNC: 38 U/L — SIGNIFICANT CHANGE UP (ref 10–45)
ANION GAP SERPL CALC-SCNC: 15 MMOL/L — SIGNIFICANT CHANGE UP (ref 5–17)
ANION GAP SERPL CALC-SCNC: 9 MMOL/L — SIGNIFICANT CHANGE UP (ref 5–17)
AST SERPL-CCNC: 54 U/L — HIGH (ref 10–40)
BILIRUB DIRECT SERPL-MCNC: 1.7 MG/DL — HIGH (ref 0–0.2)
BILIRUB INDIRECT FLD-MCNC: 6.5 MG/DL — HIGH (ref 0.2–1)
BILIRUB SERPL-MCNC: 8.2 MG/DL — HIGH (ref 0.2–1.2)
BUN SERPL-MCNC: 21 MG/DL — SIGNIFICANT CHANGE UP (ref 7–23)
BUN SERPL-MCNC: 24 MG/DL — HIGH (ref 7–23)
CALCIUM SERPL-MCNC: 9.4 MG/DL — SIGNIFICANT CHANGE UP (ref 8.4–10.5)
CALCIUM SERPL-MCNC: 9.6 MG/DL — SIGNIFICANT CHANGE UP (ref 8.4–10.5)
CHLORIDE SERPL-SCNC: 101 MMOL/L — SIGNIFICANT CHANGE UP (ref 96–108)
CHLORIDE SERPL-SCNC: 99 MMOL/L — SIGNIFICANT CHANGE UP (ref 96–108)
CO2 SERPL-SCNC: 23 MMOL/L — SIGNIFICANT CHANGE UP (ref 22–31)
CO2 SERPL-SCNC: 26 MMOL/L — SIGNIFICANT CHANGE UP (ref 22–31)
CREAT SERPL-MCNC: 0.84 MG/DL — SIGNIFICANT CHANGE UP (ref 0.5–1.3)
CREAT SERPL-MCNC: 0.92 MG/DL — SIGNIFICANT CHANGE UP (ref 0.5–1.3)
GLUCOSE BLDC GLUCOMTR-MCNC: 176 MG/DL — HIGH (ref 70–99)
GLUCOSE BLDC GLUCOMTR-MCNC: 176 MG/DL — HIGH (ref 70–99)
GLUCOSE BLDC GLUCOMTR-MCNC: 238 MG/DL — HIGH (ref 70–99)
GLUCOSE SERPL-MCNC: 143 MG/DL — HIGH (ref 70–99)
GLUCOSE SERPL-MCNC: 202 MG/DL — HIGH (ref 70–99)
HCT VFR BLD CALC: 23.2 % — LOW (ref 39–50)
HGB BLD-MCNC: 7.9 G/DL — LOW (ref 13–17)
MAGNESIUM SERPL-MCNC: 1.8 MG/DL — SIGNIFICANT CHANGE UP (ref 1.6–2.6)
MAGNESIUM SERPL-MCNC: 1.8 MG/DL — SIGNIFICANT CHANGE UP (ref 1.6–2.6)
MCHC RBC-ENTMCNC: 33.1 PG — SIGNIFICANT CHANGE UP (ref 27–34)
MCHC RBC-ENTMCNC: 34.1 GM/DL — SIGNIFICANT CHANGE UP (ref 32–36)
MCV RBC AUTO: 97.1 FL — SIGNIFICANT CHANGE UP (ref 80–100)
NRBC # BLD: 0 /100 WBCS — SIGNIFICANT CHANGE UP (ref 0–0)
PHOSPHATE SERPL-MCNC: 2.5 MG/DL — SIGNIFICANT CHANGE UP (ref 2.5–4.5)
PHOSPHATE SERPL-MCNC: 2.8 MG/DL — SIGNIFICANT CHANGE UP (ref 2.5–4.5)
PLATELET # BLD AUTO: 53 K/UL — LOW (ref 150–400)
POTASSIUM SERPL-MCNC: 5.1 MMOL/L — SIGNIFICANT CHANGE UP (ref 3.5–5.3)
POTASSIUM SERPL-MCNC: 5.4 MMOL/L — HIGH (ref 3.5–5.3)
POTASSIUM SERPL-SCNC: 5.1 MMOL/L — SIGNIFICANT CHANGE UP (ref 3.5–5.3)
POTASSIUM SERPL-SCNC: 5.4 MMOL/L — HIGH (ref 3.5–5.3)
PROT SERPL-MCNC: 5.3 G/DL — LOW (ref 6–8.3)
RBC # BLD: 2.39 M/UL — LOW (ref 4.2–5.8)
RBC # FLD: 20 % — HIGH (ref 10.3–14.5)
SODIUM SERPL-SCNC: 136 MMOL/L — SIGNIFICANT CHANGE UP (ref 135–145)
SODIUM SERPL-SCNC: 137 MMOL/L — SIGNIFICANT CHANGE UP (ref 135–145)
WBC # BLD: 6.67 K/UL — SIGNIFICANT CHANGE UP (ref 3.8–10.5)
WBC # FLD AUTO: 6.67 K/UL — SIGNIFICANT CHANGE UP (ref 3.8–10.5)

## 2020-03-19 PROCEDURE — 99232 SBSQ HOSP IP/OBS MODERATE 35: CPT

## 2020-03-19 PROCEDURE — 93010 ELECTROCARDIOGRAM REPORT: CPT

## 2020-03-19 PROCEDURE — 99233 SBSQ HOSP IP/OBS HIGH 50: CPT | Mod: GC

## 2020-03-19 PROCEDURE — 99233 SBSQ HOSP IP/OBS HIGH 50: CPT

## 2020-03-19 RX ORDER — DEXTROSE 50 % IN WATER 50 %
25 SYRINGE (ML) INTRAVENOUS ONCE
Refills: 0 | Status: DISCONTINUED | OUTPATIENT
Start: 2020-03-19 | End: 2020-03-24

## 2020-03-19 RX ORDER — DEXTROSE 50 % IN WATER 50 %
15 SYRINGE (ML) INTRAVENOUS ONCE
Refills: 0 | Status: DISCONTINUED | OUTPATIENT
Start: 2020-03-19 | End: 2020-03-24

## 2020-03-19 RX ORDER — MAGNESIUM SULFATE 500 MG/ML
2 VIAL (ML) INJECTION ONCE
Refills: 0 | Status: COMPLETED | OUTPATIENT
Start: 2020-03-19 | End: 2020-03-19

## 2020-03-19 RX ORDER — INSULIN LISPRO 100/ML
3 VIAL (ML) SUBCUTANEOUS
Refills: 0 | Status: DISCONTINUED | OUTPATIENT
Start: 2020-03-19 | End: 2020-03-20

## 2020-03-19 RX ORDER — DEXTROSE 50 % IN WATER 50 %
12.5 SYRINGE (ML) INTRAVENOUS ONCE
Refills: 0 | Status: DISCONTINUED | OUTPATIENT
Start: 2020-03-19 | End: 2020-03-24

## 2020-03-19 RX ORDER — GLUCAGON INJECTION, SOLUTION 0.5 MG/.1ML
1 INJECTION, SOLUTION SUBCUTANEOUS ONCE
Refills: 0 | Status: DISCONTINUED | OUTPATIENT
Start: 2020-03-19 | End: 2020-03-24

## 2020-03-19 RX ORDER — SODIUM CHLORIDE 9 MG/ML
1000 INJECTION, SOLUTION INTRAVENOUS
Refills: 0 | Status: DISCONTINUED | OUTPATIENT
Start: 2020-03-19 | End: 2020-03-24

## 2020-03-19 RX ADMIN — LACTULOSE 30 GRAM(S): 10 SOLUTION ORAL at 05:54

## 2020-03-19 RX ADMIN — Medication 1 MILLIGRAM(S): at 13:14

## 2020-03-19 RX ADMIN — Medication 50 GRAM(S): at 11:24

## 2020-03-19 RX ADMIN — LACTULOSE 30 GRAM(S): 10 SOLUTION ORAL at 14:44

## 2020-03-19 RX ADMIN — LACTULOSE 30 GRAM(S): 10 SOLUTION ORAL at 22:32

## 2020-03-19 RX ADMIN — TAMSULOSIN HYDROCHLORIDE 0.4 MILLIGRAM(S): 0.4 CAPSULE ORAL at 21:57

## 2020-03-19 RX ADMIN — MIDODRINE HYDROCHLORIDE 30 MILLIGRAM(S): 2.5 TABLET ORAL at 21:57

## 2020-03-19 RX ADMIN — INSULIN GLARGINE 15 UNIT(S): 100 INJECTION, SOLUTION SUBCUTANEOUS at 22:32

## 2020-03-19 RX ADMIN — Medication 2: at 13:14

## 2020-03-19 RX ADMIN — Medication 3 UNIT(S): at 17:39

## 2020-03-19 RX ADMIN — PANTOPRAZOLE SODIUM 40 MILLIGRAM(S): 20 TABLET, DELAYED RELEASE ORAL at 08:33

## 2020-03-19 RX ADMIN — Medication 4: at 17:39

## 2020-03-19 RX ADMIN — Medication 50 GRAM(S): at 05:51

## 2020-03-19 RX ADMIN — MIDODRINE HYDROCHLORIDE 30 MILLIGRAM(S): 2.5 TABLET ORAL at 05:50

## 2020-03-19 RX ADMIN — MIDODRINE HYDROCHLORIDE 30 MILLIGRAM(S): 2.5 TABLET ORAL at 13:14

## 2020-03-19 RX ADMIN — Medication 125 MILLIMOLE(S): at 11:25

## 2020-03-19 RX ADMIN — Medication 3 UNIT(S): at 13:13

## 2020-03-19 RX ADMIN — CHLORHEXIDINE GLUCONATE 1 APPLICATION(S): 213 SOLUTION TOPICAL at 05:51

## 2020-03-19 RX ADMIN — ERTAPENEM SODIUM 120 MILLIGRAM(S): 1 INJECTION, POWDER, LYOPHILIZED, FOR SOLUTION INTRAMUSCULAR; INTRAVENOUS at 14:58

## 2020-03-19 NOTE — PROGRESS NOTE ADULT - ATTENDING COMMENTS
64M cirrhosis with prostate abscess  septic shock after drainage  As drainage likely not complete and given size of abscess/prostate favor 3-4 week course of antibiotics from drainage  Will need midline  BCx clear to date so no ID contraindication for midline    I will continue to follow. Please feel free to contact me with any further questions.    Eusebio Pérez M.D.  [facility] Division of Infectious Disease  8AM-5PM: Pager Number [phone]  After Hours (or if no response): Please contact the Infectious Diseases Office at [phone]     The above assessment and plan were discussed with medicine resident.

## 2020-03-19 NOTE — PROGRESS NOTE ADULT - PROBLEM SELECTOR PLAN 2
- Endo following, recs appreciated   - Low AM cortisol level   - Per Endo: Clinically no evidence of adrenal insufficiency. Low cortisol level in this case most likely due to low CBG from cirrhosis.  -corticosteroid binding globulin pending 3/15.

## 2020-03-19 NOTE — PROGRESS NOTE ADULT - ASSESSMENT
64y male s/p TUR of prostate abscess  - Troubleshot Bob for leakage around catheter and restored flow of urine.  - Bedside ultrasound done to evaluate placement of bob - bob balloon visualized in the bladder. Gentle Flushing of catheter performed and movement of catheter around in bladder helped to restore flow of bob catheter. [place] tip does not need to be removed at this time.   - Continue IV ABX  - Monitor potassium.  - Pt is stable from a urological standpoint and may return to the medicine service.   - Please do not remove bob catheter for 4+ weeks as pt heals.   - please call if any concerns with bob output quantity, color or any other urologic concerns.   - Will continue to follow when on the medicine service.

## 2020-03-19 NOTE — PROGRESS NOTE ADULT - SUBJECTIVE AND OBJECTIVE BOX
Follow Up:  Prostatic abscess with ESBL Ecoli    Inverval History/ROS:Patient is a 64y old  Male who presents with a chief complaint of Syncopal episode at home at 0430 this am, LOC for approx 3 minutes per wife, pt had episode of shaking during that time. Hx of multiple falls. Pt reporting abdominal pain for X 1day, no nausea or vomiting, endorses loose stool on lactulose at home (19 Mar 2020 13:46)    No acute overnight events, pt has been afebrile since 3/15, not hypotensive, feeling well. Denies fevers, chils, cough, weakness, pelvic pain, diarrhea. Beasley exchanged      Allergies    codeine (Anaphylaxis)    Intolerances        ANTIMICROBIALS:  ertapenem  IVPB 1000 every 24 hours  rifAXIMin 550 two times a day      OTHER MEDS:  chlorhexidine 2% Cloths 1 Application(s) Topical <User Schedule>  dextrose 40% Gel 15 Gram(s) Oral once PRN  dextrose 5%. 1000 milliLiter(s) IV Continuous <Continuous>  dextrose 50% Injectable 12.5 Gram(s) IV Push once  dextrose 50% Injectable 25 Gram(s) IV Push once  dextrose 50% Injectable 25 Gram(s) IV Push once  folic acid 1 milliGRAM(s) Oral daily  glucagon  Injectable 1 milliGRAM(s) IntraMuscular once PRN  insulin glargine Injectable (LANTUS) 15 Unit(s) SubCutaneous at bedtime  insulin lispro (HumaLOG) corrective regimen sliding scale   SubCutaneous three times a day before meals  insulin lispro (HumaLOG) corrective regimen sliding scale   SubCutaneous at bedtime  insulin lispro Injectable (HumaLOG) 3 Unit(s) SubCutaneous three times a day with meals  lactulose Syrup 30 Gram(s) Oral three times a day  midodrine. 30 milliGRAM(s) Oral every 8 hours  pantoprazole    Tablet 40 milliGRAM(s) Oral before breakfast  tamsulosin 0.4 milliGRAM(s) Oral at bedtime      Vital Signs Last 24 Hrs  T(C): 36.4 (19 Mar 2020 13:46), Max: 37 (19 Mar 2020 07:00)  T(F): 97.6 (19 Mar 2020 13:46), Max: 98.6 (19 Mar 2020 07:00)  HR: 84 (19 Mar 2020 13:46) (73 - 84)  BP: 97/58 (19 Mar 2020 13:46) (97/58 - 123/65)  BP(mean): 77 (19 Mar 2020 07:00) (72 - 88)  RR: 18 (19 Mar 2020 13:46) (10 - 23)  SpO2: 99% (19 Mar 2020 13:46) (96% - 100%)    Physical Exam  Gen: Well appearing, NAD  Eyes: scleral icterus  Lungs: CTA B/L, no w/r/r  Heart: RRR, no m/r/g  Abdomen: soft, non-ttp  Ext: no pitting edema  Skin: no rashes                          7.9    6.67  )-----------( 53       ( 19 Mar 2020 06:58 )             23.2       03-19    136  |  101  |  21  ----------------------------<  143<H>  5.1   |  26  |  0.84    Ca    9.4      19 Mar 2020 06:55  Phos  2.5     03-19  Mg     1.8     03-19    TPro  5.3<L>  /  Alb  3.0<L>  /  TBili  8.2<H>  /  DBili  1.7<H>  /  AST  54<H>  /  ALT  38  /  AlkPhos  116  03-18          MICROBIOLOGY:Culture Results:   Insufficient growth-culture reincubated (03-16 @ 04:48)  Culture Results:   No growth to date. (03-16 @ 01:53)  Culture Results:   No growth to date. (03-16 @ 01:53)  Culture Results:   Moderate Escherichia coli ESBL (03-15 @ 17:24)      RADIOLOGY:  < from: Xray Chest 1 View- PORTABLE-Routine (03.18.20 @ 07:08) >    IMPRESSION:   Clear lungs      < from: CT Pelvis No Cont (03.13.20 @ 00:09) >    IMPRESSION:     Prostate gland is again noted to be enlarged with diffusely low attenuation appearance, concerning for underlying abscess. Following instillation of contrast into the urinary bladder, there is evidence of extraluminal contrast tracking along the anterior aspect of the prostate gland. This may be secondary to fistulous communication with either the posterior wall of the urinary bladder or the urethra.    Diffusely thickened wall of the urinary bladder with mild infiltration of the perivesicle fat.    Cirrhotic configuration of the liver with evidence of portal hypertension and multiple varices throughout the abdomen.    Nonobstructing 0.7 cm left intrarenal calculus. Follow Up:  Prostatic abscess with ESBL Ecoli    Inverval History/ROS:Patient is a 64y old  Male who presents with a chief complaint of Syncopal episode at home at 0430 this am, LOC for approx 3 minutes per wife, pt had episode of shaking during that time. Hx of multiple falls. Pt reporting abdominal pain for X 1day, no nausea or vomiting, endorses loose stool on lactulose at home (19 Mar 2020 13:46)    No acute overnight events, pt has been afebrile since 3/15, not hypotensive, feeling well. Denies fevers, chils, cough, weakness, pelvic pain, diarrhea. Beasley exchanged      Allergies    codeine (Anaphylaxis)    Intolerances        ANTIMICROBIALS:  ertapenem  IVPB 1000 every 24 hours  rifAXIMin 550 two times a day      OTHER MEDS:  chlorhexidine 2% Cloths 1 Application(s) Topical <User Schedule>  dextrose 40% Gel 15 Gram(s) Oral once PRN  dextrose 5%. 1000 milliLiter(s) IV Continuous <Continuous>  dextrose 50% Injectable 12.5 Gram(s) IV Push once  dextrose 50% Injectable 25 Gram(s) IV Push once  dextrose 50% Injectable 25 Gram(s) IV Push once  folic acid 1 milliGRAM(s) Oral daily  glucagon  Injectable 1 milliGRAM(s) IntraMuscular once PRN  insulin glargine Injectable (LANTUS) 15 Unit(s) SubCutaneous at bedtime  insulin lispro (HumaLOG) corrective regimen sliding scale   SubCutaneous three times a day before meals  insulin lispro (HumaLOG) corrective regimen sliding scale   SubCutaneous at bedtime  insulin lispro Injectable (HumaLOG) 3 Unit(s) SubCutaneous three times a day with meals  lactulose Syrup 30 Gram(s) Oral three times a day  midodrine. 30 milliGRAM(s) Oral every 8 hours  pantoprazole    Tablet 40 milliGRAM(s) Oral before breakfast  tamsulosin 0.4 milliGRAM(s) Oral at bedtime      Vital Signs Last 24 Hrs  T(C): 36.4 (19 Mar 2020 13:46), Max: 37 (19 Mar 2020 07:00)  T(F): 97.6 (19 Mar 2020 13:46), Max: 98.6 (19 Mar 2020 07:00)  HR: 84 (19 Mar 2020 13:46) (73 - 84)  BP: 97/58 (19 Mar 2020 13:46) (97/58 - 123/65)  BP(mean): 77 (19 Mar 2020 07:00) (72 - 88)  RR: 18 (19 Mar 2020 13:46) (10 - 23)  SpO2: 99% (19 Mar 2020 13:46) (96% - 100%)    Physical Exam  Gen: Well appearing, NAD  Eyes: scleral icterus  Lungs: CTA B/L, no w/r/r  Heart: RRR, no m/r/g  Abdomen: soft, non-ttp  Ext: no pitting edema  Skin: no rashes                          7.9    6.67  )-----------( 53       ( 19 Mar 2020 06:58 )             23.2       03-19    136  |  101  |  21  ----------------------------<  143<H>  5.1   |  26  |  0.84    Ca    9.4      19 Mar 2020 06:55  Phos  2.5     03-19  Mg     1.8     03-19    TPro  5.3<L>  /  Alb  3.0<L>  /  TBili  8.2<H>  /  DBili  1.7<H>  /  AST  54<H>  /  ALT  38  /  AlkPhos  116  03-18          MICROBIOLOGY:Culture Results:   Insufficient growth-culture reincubated (03-16 @ 04:48)  Culture Results:   No growth to date. (03-16 @ 01:53)  Culture Results:   No growth to date. (03-16 @ 01:53)  Culture Results:   Moderate Escherichia coli ESBL (03-15 @ 17:24)      RADIOLOGY:    <The imaging below has been reviewed and visualized by me independently. Findings as detailed in report below>    < from: Xray Chest 1 View- PORTABLE-Routine (03.18.20 @ 07:08) >    IMPRESSION:   Clear lungs      < from: CT Pelvis No Cont (03.13.20 @ 00:09) >    IMPRESSION:     Prostate gland is again noted to be enlarged with diffusely low attenuation appearance, concerning for underlying abscess. Following instillation of contrast into the urinary bladder, there is evidence of extraluminal contrast tracking along the anterior aspect of the prostate gland. This may be secondary to fistulous communication with either the posterior wall of the urinary bladder or the urethra.    Diffusely thickened wall of the urinary bladder with mild infiltration of the perivesicle fat.    Cirrhotic configuration of the liver with evidence of portal hypertension and multiple varices throughout the abdomen.    Nonobstructing 0.7 cm left intrarenal calculus.

## 2020-03-19 NOTE — PROGRESS NOTE ADULT - SUBJECTIVE AND OBJECTIVE BOX
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 Collateral information obtained from chart review as patient is not communicative during evaluation. During admission patient had CT scan which showed diffuse low density appearance of the prostate concerning for abscess while patient reporting difficulty with urination but denying dysuria, urgency, frequency, gross hematuria, or rectal pain/pressure. Patient was take to OR with urology on 3/15/20 and underwent cystoscopy and transurethral drainage of prostatic abscess. Postop patient developed hypotension, altered mental status and bright red blood in bob bag. SICU consulted for evaluation. Vasopressor support was initiated with Levo and Vaso and MTP was activated, 4 units of FFP and 4units of PRBC were transfused, also received 1unit platelets and vitamin K.    24 HOUR EVENTS:  -episode of low BP, given albumin responded  -left fem A line d/cd  -off vasopressors  -some leakage around the meatus, Bob flushed, draining      SUBJECTIVE/ROS:  [x ] A ten-point review of systems was otherwise negative except as noted.  [ ] Due to altered mental status/intubation, subjective information were not able to be obtained from the patient. History was obtained, to the extent possible, from review of the chart and collateral sources of information.      NEURO  Exam: awake, alert, oriented  Meds:   [x] Adequacy of sedation and pain control has been assessed and adjusted      RESPIRATORY  RR: 21 (03-19-20 @ 01:00) (8 - 41)  SpO2: 100% (03-19-20 @ 01:00) (93% - 100%)  Exam: unlabored, clear to auscultation bilaterally  Mechanical Ventilation: no  ABG - ( 18 Mar 2020 00:33 )  pH: 7.46  /  pCO2: 44    /  pO2: 118   / HCO3: 30    / Base Excess: 6.2   /  SaO2: 100     Lactate: 2.6         [N/A] Extubation Readiness Assessed  Meds: none    CARDIOVASCULAR  HR: 80 (03-19-20 @ 01:00) (73 - 81)  BP: 100/57 (03-19-20 @ 01:00) (84/54 - 114/56)  BP(mean): 88 (03-19-20 @ 01:00) (64 - 88)  ABP: 123/61 (03-18-20 @ 13:00) (99/45 - 140/72)  ABP(mean): 84 (03-18-20 @ 13:00) (65 - 98)  VBG - ( 18 Mar 2020 23:13 )  pH: 7.38  /  pCO2: 51    /  pO2: 26    / HCO3: 29    / Base Excess: 4.1   /  SaO2: 37     Lactate: 2.6        Exam: regular rate and rhythm  Cardiac Rhythm: sinus  Perfusion     [x]Adequate   [ ]Inadequate  Mentation   [x]Normal       [ ]Reduced  Extremities  [x]Warm         [ ]Cool  Volume Status [ ]Hypervolemic [x]Euvolemic [ ]Hypovolemic  Meds: midodrine. 30 milliGRAM(s) Oral every 8 hours  tamsulosin 0.4 milliGRAM(s) Oral at bedtime      GI/NUTRITION  Exam: soft, nontender, nondistended, incision C/D/I  Diet: reg diet  Meds: lactulose Syrup 30 Gram(s) Oral three times a day  pantoprazole    Tablet 40 milliGRAM(s) Oral before breakfast      GENITOURINARY  I&O's Detail    03-17 @ 07:01  -  03-18 @ 07:00  --------------------------------------------------------  IN:    Albumin 5%  - 250 mL: 250 mL    IV PiggyBack: 216.6 mL    Oral Fluid: 300 mL  Total IN: 766.6 mL    OUT:    Indwelling Catheter - Urethral: 2870 mL  Total OUT: 2870 mL    Total NET: -2103.4 mL      03-18 @ 07:01  -  03-19 @ 01:34  --------------------------------------------------------  IN:    Albumin 5%  - 250 mL: 250 mL    IV PiggyBack: 250 mL    Oral Fluid: 980 mL  Total IN: 1480 mL    OUT:    Indwelling Catheter - Urethral: 3360 mL  Total OUT: 3360 mL    Total NET: -1880 mL      03-18    137  |  99  |  24<H>  ----------------------------<  202<H>  5.4<H>   |  23  |  0.92    Ca    9.6      18 Mar 2020 23:14  Phos  2.8     03-18  Mg     1.8     03-18    TPro  5.3<L>  /  Alb  3.0<L>  /  TBili  8.2<H>  /  DBili  1.7<H>  /  AST  54<H>  /  ALT  38  /  AlkPhos  116  03-18    [x ] Bob catheter, 3/15  Meds: folic acid 1 milliGRAM(s) Oral daily        HEMATOLOGIC  Meds:   [x] VTE Prophylaxis                        7.5    7.21  )-----------( 52       ( 18 Mar 2020 23:14 )             22.9     PT/INR - ( 18 Mar 2020 23:14 )   PT: 24.9 sec;   INR: 2.12 ratio         PTT - ( 18 Mar 2020 23:14 )  PTT:49.7 sec  Transfusion     [ ] PRBC   [ ] Platelets   [ ] FFP   [ ] Cryoprecipitate      INFECTIOUS DISEASES  WBC Count: 7.21 K/uL (03-18 @ 23:14)  WBC Count: 8.29 K/uL (03-18 @ 07:44)    RECENT CULTURES:  Specimen Source: .Urine Clean Catch (Midstream)  Date/Time: 03-16 @ 04:48    Specimen Source: .Blood Blood-Peripheral  Date/Time: 03-16 @ 01:53  No growth to date.  Specimen Source: .Abscess prostate abscess  Date/Time: 03-15 @ 17:24  Moderate Escherichia coli ESBL  Organism: Escherichia coli ESBL    Meds: ertapenem  IVPB 1000 milliGRAM(s) IV Intermittent every 24 hours  rifAXIMin 550 milliGRAM(s) Oral two times a day    ENDOCRINE  CAPILLARY BLOOD GLUCOSE    POCT Blood Glucose.: 210 mg/dL (18 Mar 2020 22:21)  POCT Blood Glucose.: 190 mg/dL (18 Mar 2020 17:53)  POCT Blood Glucose.: 159 mg/dL (18 Mar 2020 12:15)    Meds: insulin glargine Injectable (LANTUS) 15 Unit(s) SubCutaneous at bedtime  insulin lispro (HumaLOG) corrective regimen sliding scale   SubCutaneous three times a day before meals  insulin lispro (HumaLOG) corrective regimen sliding scale   SubCutaneous at bedtime      ACCESS DEVICES:  [x ] Peripheral IV  [ ] Central Venous Line	[ ] R	[ ] L	[ ] IJ	[ ] Fem	[ ] SC	Placed:   [ ] Arterial Line		[ ] R	[ ] L	[ ] Fem	[ ] Rad	[ ] Ax	Placed:   [ ] PICC:					[ ] Mediport  [ ] Urinary Catheter, Date Placed: 3/15  [x] Necessity of urinary, arterial, and venous catheters discussed    OTHER MEDICATIONS:  chlorhexidine 2% Cloths 1 Application(s) Topical <User Schedule>    CODE STATUS:  Full code

## 2020-03-19 NOTE — PROGRESS NOTE ADULT - SUBJECTIVE AND OBJECTIVE BOX
[facility]    Sushila Dhaliwal, Doug, Morenita, Ayo, Clay, Colleen      [phone]    CHIEF COMPLAINT: Patient is a 64y old  Male who presents with a chief complaint of Syncopal episode at home at 0430 this am, LOC for approx 3 minutes per wife, pt had episode of shaking during that time. Hx of multiple falls. Pt reporting abdominal pain for X 1day, no nausea or vomiting, endorses loose stool on lactulose at home (19 Mar 2020 07:43)      Follow Up: hypotension, hemorrhagic shock    Interim history: The patient reports no new symptoms.  Denies chest discomfort and shortness of breath.  No abdominal pain.  No new neurologic symptoms. Significant hematuria ongoing      MEDICATIONS  (STANDING):  chlorhexidine 2% Cloths 1 Application(s) Topical <User Schedule>  ertapenem  IVPB 1000 milliGRAM(s) IV Intermittent every 24 hours  folic acid 1 milliGRAM(s) Oral daily  insulin glargine Injectable (LANTUS) 15 Unit(s) SubCutaneous at bedtime  insulin lispro (HumaLOG) corrective regimen sliding scale   SubCutaneous three times a day before meals  insulin lispro (HumaLOG) corrective regimen sliding scale   SubCutaneous at bedtime  insulin lispro Injectable (HumaLOG) 3 Unit(s) SubCutaneous three times a day with meals  lactulose Syrup 30 Gram(s) Oral three times a day  magnesium sulfate  IVPB 2 Gram(s) IV Intermittent once  midodrine. 30 milliGRAM(s) Oral every 8 hours  pantoprazole    Tablet 40 milliGRAM(s) Oral before breakfast  rifAXIMin 550 milliGRAM(s) Oral two times a day  sodium phosphate IVPB 15 milliMole(s) IV Intermittent once  tamsulosin 0.4 milliGRAM(s) Oral at bedtime    MEDICATIONS  (PRN):      REVIEW OF SYSTEMS:  eye, ent, GI, , allergic, dermatologic, musculoskeletal and neurologic are negative except as described above    Vital Signs Last 24 Hrs  T(C): 37 (19 Mar 2020 07:00), Max: 37 (19 Mar 2020 07:00)  T(F): 98.6 (19 Mar 2020 07:00), Max: 98.6 (19 Mar 2020 07:00)  HR: 77 (19 Mar 2020 07:00) (74 - 81)  BP: 108/57 (19 Mar 2020 07:00) (84/54 - 114/56)  BP(mean): 77 (19 Mar 2020 07:00) (64 - 88)  RR: 23 (19 Mar 2020 07:00) (8 - 41)  SpO2: 99% (19 Mar 2020 07:00) (93% - 100%)    I&O's Summary    18 Mar 2020 07:01  -  19 Mar 2020 07:00  --------------------------------------------------------  IN: 1580 mL / OUT: 3995 mL / NET: -2415 mL    19 Mar 2020 07:01  -  19 Mar 2020 09:12  --------------------------------------------------------  IN: 0 mL / OUT: 45 mL / NET: -45 mL        Telemetry past 24h: sr    PHYSICAL EXAM:    Constitutional: well-nourished, well-developed, NAD   HEENT:  MMM, sclerae anicteric, conjunctivae clear, no oral cyanosis.  Pulmonary: Non-labored, breath sounds are clear bilaterally, No wheezing, rales or rhonchi  Cardiovascular: Regular, S1 and S2.  No murmur.  No rubs, gallops or clicks  Gastrointestinal: Bowel Sounds present, soft, nontender.   Lymph: mild peripheral edema.   Neurological: Alert, no focal deficits  Skin: No rashes.  Psych:  Mood & affect appropriate    LABS: All Labs Reviewed:                        7.9    6.67  )-----------( 53       ( 19 Mar 2020 06:58 )             23.2                         7.5    7.21  )-----------( 52       ( 18 Mar 2020 23:14 )             22.9                         7.4    8.29  )-----------( 45       ( 18 Mar 2020 07:44 )             22.0     19 Mar 2020 06:55    136    |  101    |  21     ----------------------------<  143    5.1     |  26     |  0.84   18 Mar 2020 23:14    137    |  99     |  24     ----------------------------<  202    5.4     |  23     |  0.92   18 Mar 2020 07:44    136    |  99     |  26     ----------------------------<  158    5.3     |  30     |  0.78     Ca    9.4        19 Mar 2020 06:55  Ca    9.6        18 Mar 2020 23:14  Ca    9.4        18 Mar 2020 07:44  Phos  2.5       19 Mar 2020 06:55  Phos  2.8       18 Mar 2020 23:14  Phos  2.5       18 Mar 2020 07:44  Mg     1.8       19 Mar 2020 06:55  Mg     1.8       18 Mar 2020 23:14  Mg     1.9       18 Mar 2020 07:44    TPro  5.3    /  Alb  3.0    /  TBili  8.2    /  DBili  1.7    /  AST  54     /  ALT  38     /  AlkPhos  116    18 Mar 2020 23:14  TPro  4.9    /  Alb  2.9    /  TBili  8.2    /  DBili  1.8    /  AST  69     /  ALT  40     /  AlkPhos  87     18 Mar 2020 00:35  TPro  5.0    /  Alb  3.1    /  TBili  9.1    /  DBili  2.1    /  AST  78     /  ALT  45     /  AlkPhos  74     17 Mar 2020 16:56    PT/INR - ( 18 Mar 2020 23:14 )   PT: 24.9 sec;   INR: 2.12 ratio         PTT - ( 18 Mar 2020 23:14 )  PTT:49.7 sec      Blood Culture: Organism --  Gram Stain Blood -- Gram Stain --  Specimen Source .Urine Clean Catch (Midstream)  Culture-Blood --    Organism --  Gram Stain Blood -- Gram Stain --  Specimen Source .Blood Blood-Peripheral  Culture-Blood --    Organism Escherichia coli ESBL  Gram Stain Blood -- Gram Stain --  Specimen Source .Abscess prostate abscess  Culture-Blood --            RADIOLOGY:    EKG:    Echo:

## 2020-03-19 NOTE — PROGRESS NOTE ADULT - ASSESSMENT
63 y/o M w/ uncontrolled Type 2 DM w/ hyperglycemia complicated by neuropathy and retinopathy, JEAN cirrhosis admitted with fall at home, general weakness/syncope s/p cystoscopy and transurethral drainage of prostatic abscess (3/15/20) c/b postop hypotension, altered mental status and bright red blood in bob bag. Found to have a low am cortisol on workup for orthostasis. Tolerating POs. BG values increased through the day yesterday. Will add low dose premeal to improve glycemic control. BG goal (100-180mg/dl).

## 2020-03-19 NOTE — PROGRESS NOTE ADULT - ATTENDING COMMENTS
- Decompensated cirrhosis- stable  - Hemorrhagic shock- resolved, monitor urine   - Septic shock- Abscess drained, resuscitated, continue course abx  Full code  Transfer floor

## 2020-03-19 NOTE — PROGRESS NOTE ADULT - PROBLEM SELECTOR PLAN 2
patient had low cortisol levels  -he has multiple acute medical issues requiring ICU care  -corticosteroid binding globulin pending 3/15.    -I spent a total time of 25 mins with the patient at bedside/on unit of which more than 50% of time was spent on counseling/coordination of care.     Due to [facility] policy during the evolving novel coronavirus outbreak, efforts are being made to limit unnecessary patient contacts to limit the spread of disease.   Accordingly, patients without clear indication for physical exam or face to face interview from the Endocrine team will be adjusted in conjunction with conversations with primary provider team. This is being done for the safety of all the patients we care for.    Call w/any questions  pager: [phone]   Cell: [phone]  office: [phone]

## 2020-03-19 NOTE — PROGRESS NOTE ADULT - PROBLEM SELECTOR PLAN 6
B12 was high in 12/19  Folate was 10.9 in 12/19  - C/w folic acid    - Hemoglobin is currently stable B12 was high in 12/19  Folate was 10.9 in 12/19  - C/w folic acid    - Hemoglobin is currently stable    # Acute blood loss anemia   After cystoscopy and transurethral drainage of prostatic abscess patient developed hypotension, altered mental status and bright red blood in bob bag. Vasopressor support was initiated with Levo and Vaso and MTP was activated, 4 units of FFP and 4units of PRBC were transfused, also received 1unit platelets and vitamin K.  - Hgb currently stable  - Continue to monitor CBCs

## 2020-03-19 NOTE — PROGRESS NOTE ADULT - ASSESSMENT
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   Recent admission for ESBL E coli Bacteremia and UTI  Patient found to have pyuria on U/A and urine with visible thick sediment  CT A/P (reviewed with radiology) with significant interval increase in prostate size from last month (concerning for abscess)  Could not tolerate MRI  CT A/P (with PO/IV contrast) without concrete abscess but suspicious appearing prostate. Also with fistulous communication with either the posterior wall of the urinary bladder or the urethra.    On 3/15 underwent drainage or prostate abscess   Worsening leukocytosis and septic shock after drainage  Anticipate may have had transient bacteremia with prostate manipulation  Prostate Abscess Cultures with ESBL E coli  Can deescalate to Ertapenem    Given prostatitis/UTI/abscess patient is not cleared from ID perspective at this time for OLT    Overall, UTI, Prostate Abscess, Leukocytosis, Septic Shock, Positive Culture Finding (UCx), Pre-OLT    --Clinically improved, afebrile, normotensive, no leukocytosis  --Pt received meropenem from 3/11-3/17, drainage performed 3/15  --Continue Ertapenem 1g IV Q24H  --Continue to follow CBC with diff  --Continue to follow renal function (Cr/BUN)  --Continue to follow temperature curve  --Follow up on preliminary blood cultures  --Urology Recommendations noted 64 year old man with cirrhosis 2/2 JEAN, CHF, BPH, and DM who presented to the ED after a fall/syncopal event.   Recent admission for ESBL E coli Bacteremia and UTI  Patient found to have pyuria on U/A and urine with visible thick sediment  CT A/P (reviewed with radiology) with significant interval increase in prostate size from last month (concerning for abscess)  Could not tolerate MRI  CT A/P (with PO/IV contrast) without concrete abscess but suspicious appearing prostate. Also with fistulous communication with either the posterior wall of the urinary bladder or the urethra.    On 3/15 underwent drainage or prostate abscess   Worsening leukocytosis and septic shock after drainage  Anticipate may have had transient bacteremia with prostate manipulation  Prostate Abscess Cultures with ESBL E coli    Given prostatitis/UTI/abscess patient is not cleared from ID perspective at this time for OLT    Overall, UTI, Prostate Abscess, Leukocytosis, Septic Shock, Positive Culture Finding (UCx), Pre-OLT    --Clinically improved, afebrile, normotensive, no leukocytosis  --Pt received meropenem from 3/11-3/17, drainage performed 3/15  --Continue Ertapenem 1g IV Q24H  --Continue to follow CBC with diff  --Continue to follow renal function (Cr/BUN)  --Continue to follow temperature curve  --Follow up on preliminary blood cultures  --Urology Recommendations noted

## 2020-03-19 NOTE — PROGRESS NOTE ADULT - PROBLEM SELECTOR PLAN 9
- FSG QAC/ HS and ISS  -c/w Lantus 15 units QHS  - Start Humalog 3 units AC meals for now. Will titrate up based on BG values.   -A1C = 6.4 on 2/2020  -Discharge on basal/bolus  -f/u outpt w/Dr Goldberg

## 2020-03-19 NOTE — PROGRESS NOTE ADULT - PROBLEM SELECTOR PLAN 4
General with urinary symptoms with leucocytosis and abnormal urinalysis   Prolonged QTC.   - Cardiology following, recs appreciated   - Daily EKG for prolonged QTC  - Keep K>4.0 and Mg>2.0  - Avoid QTc prolonging meds   - C/w midodrine   - echo in 2/2020 with normal LV function. No need to repeat.  - No signs of significant ischemia. Cath with non obs disease General with urinary symptoms with leukocytosis and abnormal urinalysis   Prolonged QTC.   - Cardiology following, recs appreciated   - Daily EKG for prolonged QTC  - Keep K>4.0 and Mg>2.0  - Avoid QTc prolonging meds   - C/w midodrine   - echo in 2/2020 with normal LV function. No need to repeat.  - No signs of significant ischemia. Cath with non obs disease

## 2020-03-19 NOTE — PROGRESS NOTE ADULT - PROBLEM SELECTOR PLAN 3
- Prostate Abscess Cultures with ESBL E coli  - Abx as above [Resolved]   - Prostate Abscess Cultures with ESBL E coli  - Abx as above

## 2020-03-19 NOTE — PROGRESS NOTE ADULT - SUBJECTIVE AND OBJECTIVE BOX
PROGRESS NOTE:   Authored by Gerry Healy MD, PGY 1, Pager [phone] [facility], [id number] [facility]       SUBJECTIVE / OVERNIGHT EVENTS:      MEDICATIONS  (STANDING):  chlorhexidine 2% Cloths 1 Application(s) Topical <User Schedule>  dextrose 5%. 1000 milliLiter(s) (50 mL/Hr) IV Continuous <Continuous>  dextrose 50% Injectable 12.5 Gram(s) IV Push once  dextrose 50% Injectable 25 Gram(s) IV Push once  dextrose 50% Injectable 25 Gram(s) IV Push once  ertapenem  IVPB 1000 milliGRAM(s) IV Intermittent every 24 hours  folic acid 1 milliGRAM(s) Oral daily  insulin glargine Injectable (LANTUS) 15 Unit(s) SubCutaneous at bedtime  insulin lispro (HumaLOG) corrective regimen sliding scale   SubCutaneous three times a day before meals  insulin lispro (HumaLOG) corrective regimen sliding scale   SubCutaneous at bedtime  insulin lispro Injectable (HumaLOG) 3 Unit(s) SubCutaneous three times a day with meals  lactulose Syrup 30 Gram(s) Oral three times a day  midodrine. 30 milliGRAM(s) Oral every 8 hours  pantoprazole    Tablet 40 milliGRAM(s) Oral before breakfast  rifAXIMin 550 milliGRAM(s) Oral two times a day  tamsulosin 0.4 milliGRAM(s) Oral at bedtime    MEDICATIONS  (PRN):  dextrose 40% Gel 15 Gram(s) Oral once PRN Blood Glucose LESS THAN 70 milliGRAM(s)/deciLiter  glucagon  Injectable 1 milliGRAM(s) IntraMuscular once PRN Glucose <70 milliGRAM(s)/deciLiter      CAPILLARY BLOOD GLUCOSE      POCT Blood Glucose.: 176 mg/dL (19 Mar 2020 13:03)  POCT Blood Glucose.: 210 mg/dL (18 Mar 2020 22:21)  POCT Blood Glucose.: 190 mg/dL (18 Mar 2020 17:53)    I&O's Summary    18 Mar 2020 07:01  -  19 Mar 2020 07:00  --------------------------------------------------------  IN: 1580 mL / OUT: 3995 mL / NET: -2415 mL    19 Mar 2020 07:01  -  19 Mar 2020 13:47  --------------------------------------------------------  IN: 0 mL / OUT: 45 mL / NET: -45 mL        PHYSICAL EXAM:  Vital Signs Last 24 Hrs  T(C): 36.4 (19 Mar 2020 10:18), Max: 37 (19 Mar 2020 07:00)  T(F): 97.5 (19 Mar 2020 10:18), Max: 98.6 (19 Mar 2020 07:00)  HR: 82 (19 Mar 2020 11:40) (73 - 82)  BP: 123/65 (19 Mar 2020 11:40) (97/53 - 123/65)  BP(mean): 77 (19 Mar 2020 07:00) (70 - 88)  RR: 18 (19 Mar 2020 10:18) (8 - 23)  SpO2: 99% (19 Mar 2020 10:18) (93% - 100%)    CONSTITUTIONAL: NAD  RESPIRATORY: Normal respiratory effort; lungs are clear to auscultation bilaterally  CARDIOVASCULAR: Regular rate and rhythm, normal S1 and S2, no murmur/rub/gallop  ABDOMEN: Nontender to palpation, Soft, Non-distended, normoactive bowel sounds,   MUSCLOSKELETAL: No LE edema   NEURO: Alert, good concentration     LABS:                        7.9    6.67  )-----------( 53       ( 19 Mar 2020 06:58 )             23.2     03-19    136  |  101  |  21  ----------------------------<  143<H>  5.1   |  26  |  0.84    Ca    9.4      19 Mar 2020 06:55  Phos  2.5     03-19  Mg     1.8     03-19    TPro  5.3<L>  /  Alb  3.0<L>  /  TBili  8.2<H>  /  DBili  1.7<H>  /  AST  54<H>  /  ALT  38  /  AlkPhos  116  03-18    PT/INR - ( 18 Mar 2020 23:14 )   PT: 24.9 sec;   INR: 2.12 ratio         PTT - ( 18 Mar 2020 23:14 )  PTT:49.7 sec            RADIOLOGY & ADDITIONAL TESTS:  Results Reviewed:   < from: Xray Chest 1 View- PORTABLE-Routine (03.18.20 @ 07:08) >  IMPRESSION:   Clear lungs    < end of copied text > PROGRESS NOTE:   Authored by Gerry Healy MD, PGY 1, Pager [phone] [facility], [address] [facility]       SUBJECTIVE / OVERNIGHT EVENTS: Pt seen and examined at bedside. Pt denies chest pain, SOB, abd pain, nausea or vomiting. Pt transferred from SICU to medicine service.       MEDICATIONS  (STANDING):  chlorhexidine 2% Cloths 1 Application(s) Topical <User Schedule>  dextrose 5%. 1000 milliLiter(s) (50 mL/Hr) IV Continuous <Continuous>  dextrose 50% Injectable 12.5 Gram(s) IV Push once  dextrose 50% Injectable 25 Gram(s) IV Push once  dextrose 50% Injectable 25 Gram(s) IV Push once  ertapenem  IVPB 1000 milliGRAM(s) IV Intermittent every 24 hours  folic acid 1 milliGRAM(s) Oral daily  insulin glargine Injectable (LANTUS) 15 Unit(s) SubCutaneous at bedtime  insulin lispro (HumaLOG) corrective regimen sliding scale   SubCutaneous three times a day before meals  insulin lispro (HumaLOG) corrective regimen sliding scale   SubCutaneous at bedtime  insulin lispro Injectable (HumaLOG) 3 Unit(s) SubCutaneous three times a day with meals  lactulose Syrup 30 Gram(s) Oral three times a day  midodrine. 30 milliGRAM(s) Oral every 8 hours  pantoprazole    Tablet 40 milliGRAM(s) Oral before breakfast  rifAXIMin 550 milliGRAM(s) Oral two times a day  tamsulosin 0.4 milliGRAM(s) Oral at bedtime    MEDICATIONS  (PRN):  dextrose 40% Gel 15 Gram(s) Oral once PRN Blood Glucose LESS THAN 70 milliGRAM(s)/deciLiter  glucagon  Injectable 1 milliGRAM(s) IntraMuscular once PRN Glucose <70 milliGRAM(s)/deciLiter      CAPILLARY BLOOD GLUCOSE      POCT Blood Glucose.: 176 mg/dL (19 Mar 2020 13:03)  POCT Blood Glucose.: 210 mg/dL (18 Mar 2020 22:21)  POCT Blood Glucose.: 190 mg/dL (18 Mar 2020 17:53)    I&O's Summary    18 Mar 2020 07:01  -  19 Mar 2020 07:00  --------------------------------------------------------  IN: 1580 mL / OUT: 3995 mL / NET: -2415 mL    19 Mar 2020 07:01  -  19 Mar 2020 13:47  --------------------------------------------------------  IN: 0 mL / OUT: 45 mL / NET: -45 mL        PHYSICAL EXAM:  Vital Signs Last 24 Hrs  T(C): 36.4 (19 Mar 2020 10:18), Max: 37 (19 Mar 2020 07:00)  T(F): 97.5 (19 Mar 2020 10:18), Max: 98.6 (19 Mar 2020 07:00)  HR: 82 (19 Mar 2020 11:40) (73 - 82)  BP: 123/65 (19 Mar 2020 11:40) (97/53 - 123/65)  BP(mean): 77 (19 Mar 2020 07:00) (70 - 88)  RR: 18 (19 Mar 2020 10:18) (8 - 23)  SpO2: 99% (19 Mar 2020 10:18) (93% - 100%)    CONSTITUTIONAL: NAD  RESPIRATORY: Normal respiratory effort; lungs are clear to auscultation bilaterally  CARDIOVASCULAR: Regular rate and rhythm, normal S1 and S2, no murmur/rub/gallop  ABDOMEN: Large abd, nontender to palpation, Soft, + bowel sounds   MUSCLOSKELETAL: No LE edema   NEURO: Alert, good concentration     LABS:                        7.9    6.67  )-----------( 53       ( 19 Mar 2020 06:58 )             23.2     03-19    136  |  101  |  21  ----------------------------<  143<H>  5.1   |  26  |  0.84    Ca    9.4      19 Mar 2020 06:55  Phos  2.5     03-19  Mg     1.8     03-19    TPro  5.3<L>  /  Alb  3.0<L>  /  TBili  8.2<H>  /  DBili  1.7<H>  /  AST  54<H>  /  ALT  38  /  AlkPhos  116  03-18    PT/INR - ( 18 Mar 2020 23:14 )   PT: 24.9 sec;   INR: 2.12 ratio         PTT - ( 18 Mar 2020 23:14 )  PTT:49.7 sec            RADIOLOGY & ADDITIONAL TESTS:  Results Reviewed:   < from: Xray Chest 1 View- PORTABLE-Routine (03.18.20 @ 07:08) >  IMPRESSION:   Clear lungs    < end of copied text >

## 2020-03-19 NOTE — PROGRESS NOTE ADULT - SUBJECTIVE AND OBJECTIVE BOX
Subjective:  Pt seen and examined at the bedside by the urology team. Pt has overall been feeling well, but states he feels urine leaking around the bob. RN in SICU commented that chucks under patient have been saturated with urine. Other than possible leakage around bob, patient is without complaints. Denies fever/chills, chest pain, SOB, N/V, abd pain or other urinary symptoms.    Objective  Vital signs  T(F): , Max: 98.6 (03-19-20 @ 07:00)  HR: 73 (03-19-20 @ 10:18)  BP: 106/66 (03-19-20 @ 10:18)  SpO2: 99% (03-19-20 @ 10:18)  Output   OUT:    Indwelling Catheter - Urethral: 3995 mL  Total OUT: 3995 mL  Total NET: -3995 mL    OUT:    Indwelling Catheter - Urethral: 45 mL  Total OUT: 45 mL  Total NET: -45 mL    PHYSICAL EXAM:  Gen: No Acute Distress. A&Ox3  Abd: Soft, nontender, nondistended.  : No suprapubic tenderness. [place] tip bob in place draining blood tinged urine     Labs  03-19 @ 06:58  WBC 6.67  / Hct 23.2  / SCr --       03-19 @ 06:55  WBC --    / Hct --    / SCr 0.84       Urine Cx: Culture - Urine (03.16.20 @ 04:48)    Specimen Source: .Urine Clean Catch (Midstream)    Culture Results:   Insufficient growth-culture reincubated

## 2020-03-19 NOTE — CHART NOTE - NSCHARTNOTEFT_GEN_A_CORE
64y Male with pmh DM2, HLD, obesity, BPH, Orthostatic hypotension, GERD, HTN, Neuropathy, Obesity, HFpEF with mild LV diastolic dysfunction, and decompensated JEAN cirrhosis complicated by ascites, history of SBP, hepatic encephalopathy, and chronic anemia with a history of duodenal ulcer and duodenal AVM who is UNOS listed for liver transplantation at [facility] admitted with fall at home, general weakness/syncope s/p cystoscopy and transurethral drainage of prostatic abscess (3/15/20) c/b postop hypotension likely hemorrhagic required massive transfusion,, altered mental status and bright red blood in bob bag.     Vital Signs (24 Hrs):  T(C): 36.4 (03-19-20 @ 13:46), Max: 37 (03-19-20 @ 07:00)  HR: 84 (03-19-20 @ 13:46) (73 - 84)  BP: 97/58 (03-19-20 @ 13:46) (97/53 - 123/65)  RR: 18 (03-19-20 @ 13:46) (8 - 23)  SpO2: 99% (03-19-20 @ 13:46) (93% - 100%)  Wt(kg): --      To do    [ ] Urology recs. Call urology if any issues with bob  [ ] ID recs, transplant recs

## 2020-03-19 NOTE — PROGRESS NOTE ADULT - ASSESSMENT
65 yo M with IDDM, dyslipidemia, obesity, GERD, HFpEF with mild LV diastolic dysfunction, and decompensated JEAN cirrhosis complicated by ascites, history of SBP, hepatic encephalopathy, and chronic anemia with a history of duodenal ulcer as well as GAVE and duodenal AVM s/p APC (last on 10/11/19), who is UNOS listed for liver transplantation at [facility]. He has had multiple recent hospitalizations, including hospitalization from 2/11/20-2/21/20 due to septic shock secondary to emphysematous cystitis and ESBL E. coli bacteremia and from 2/28/20-3/3/20 after a mechanical fall. He is currently re-admitted after another mechanical fall likely preceded by vasovagal syncope, with evidence of urosepsis as well as mild hepatic encephalopathy and mild MISA.    Impression:  #JEAN Cirrhosis: UNOS listed for liver transplant. MELD-Na 26 on 3/15  Decompensated by esophageal varices, ascites, h/o hepatic encephalopathy and h/o SBP.   -Ascites: Trace on CT abdomen/pelvis from 02/11/20  -Varices: Small non-bleeding EV on EGD from 12/2019.  -HCC: No focal liver lesions on MRI abdomen 12/2019.   -PSE: No asterixis.     #Recurrent urosepsis with  large prostatic abscess.  Emphesematous cystitis seen on during prior hospitalization with hx of ESBL Ecoli. CT scan during this admission showing enlarged prostate concerning for abscess. There is concern for possible colovesicular fistula.  Bcx from 3/10 negative  Ucx from 3/10 showing >100,000 ESBL Ecoli  CT pelvis with IV contrast showing diffusely enlarged prostate with extravasation of contrast concerning for fistula  s/p OR for I&D of prostate abscess on 3/15      #Recurrent falls: Differential includes deconditioning vs. diabetic neuropathy.   Previous work up from previous hospitalization:    -TTE unremarkable this admission, less likely cardiac cause. CT head without acute neurologic findings.   -Mental status at baseline on lactulose    Recommendations:  - cw with IV abx  - ID and urology following, f/u recommendations  - trend CBC and fever curve daily  - Hold diuretics for now  - Continue with midodrine 30mg TID  - Continue Lactulose and Rifaximin

## 2020-03-19 NOTE — PROGRESS NOTE ADULT - ASSESSMENT
Patient  is a 64y Male with pmh DM2, HLD, obesity, BPH, Orthostatic hypotension, GERD, HTN, Neuropathy, Obesity, HFpEF with mild LV diastolic dysfunction, and decompensated JEAN cirrhosis complicated by ascites, history of SBP, hepatic encephalopathy, and chronic anemia with a history of duodenal ulcer and duodenal AVM who is UNOS listed for liver transplantation at [facility] admitted with fall at home, general weakness/syncope s/p cystoscopy and transurethral drainage of prostatic abscess (3/15/20) c/b postop hypotension, likely hemorrhagic required Massive transfusion,, altered mental status and bright red blood in bob bag.     PLAN:    NEURO: sepsis vs metabolic encephalopathy, now resolved  - Monitor mental status - now improved, is responsive and arousable   - Avoiding narcotic and sedative medication including BZDs    RESPIRATORY:   -sats well above +92%, now on room air  - incentive spirometer use  - no active issues    CARDIOVASCULAR: was in hemorrhagic shock/ septic shock recovered  - Monitor BP - maintain MAP>65  - c/w midodrine 30mg po q8h  - currently off iv pressors    GI/NUTRITION:  - regular diet, consistent carb  - PPI  - Lactulose and rifaximin  for hepatic enceph    GENITOURINARY/RENAL:  - IVL  - Creatinine cleared and within normal limits   - tamsulosin  -bob: draining bloody/dark urine    HEMATOLOGIC:  - no pRBC transfusion requirements in prior day, h/h stabilizing  - Q12H CBC  - DVT ppx on hold    INFECTIOUS DISEASE:  - ERtapenem 1000mg qd per ID   - Afebrile, leukocytosis improving.     ENDOCRINE: DM on insulin  - Monitor FS  - Insulin SS and lantus 15 units    DISPO:  - SICU  - Full code

## 2020-03-19 NOTE — PROGRESS NOTE ADULT - ASSESSMENT
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 syncope 2/2 sepsis from UTI suspected prostatitis. 64M with DM2, dyslipidemia, obesity, BPH, Orthostatic hypotension, GERD, HTN, Neuropathy, Obesity, HFpEF with mild LV diastolic dysfunction, and decompensated JEAN cirrhosis complicated by ascites, history of SBP, hepatic encephalopathy, and chronic anemia with a history of duodenal ulcer as well as GAVE and duodenal AVM s/p APC (last on 10/11/19), who is UNOS listed for liver transplantation at [facility] who presents with fall at home, general weakness and ? syncope 2/2 sepsis from UTI w/ prostatic abscess.

## 2020-03-19 NOTE — PROGRESS NOTE ADULT - PROBLEM SELECTOR PLAN 1
-test BG AC/HS  -c/w Lantus 15 units QHS (fasting glucose at goal)  -Start Humalog 3 units AC meals for now. Will titrate up based on BG values.   -c/w Humalog moderate correction scale AC and Mod HS scale  -Discharge on basal/bolus  -f/u outpt w/Dr Goldberg

## 2020-03-19 NOTE — PROGRESS NOTE ADULT - ASSESSMENT
64M with DM2,  dyslipidemia, obesity, BPH, Orthostatic hypotension, GERD, HTN, Neuropathy, Obesity, HFpEF with mild LV diastolic dysfunction, and decompensated JEAN cirrhosis complicated by ascites, history of SBP, hepatic encephalopathy, and chronic anemia with a history of duodenal ulcer as well as GAVE and duodenal AVM s/p APC (last on 10/11/19), who is UNOS listed for liver transplantation at [facility] who presents with fall at home, general weakness and ? syncope in setting of UTI/abscess.  He is s/p TURP, and now gross hematuria.  Had AMS and hypotension overnight, likely a component of both septic and hypovolemic shock.  s/p mm blood products, and not on IV pressor support    - Continue to trend H/H and transfuse as needed  - hgb stable this am but urine appears to be olga blood  - pressors off, but is very high dose midodrine    - Hold diuretics as not vol ol  - Avoid QTC prolonging drugs  - Monitor and replete electrolytes. Keep K>4.0 and Mg>2.0. Replete Mg today  - cont abx  - check daily ekg    - No signs of significant ischemia. Cath with non obs disease  - echo in 2/2020 with normal LV function. No need to repeat.      - Further cardiac workup will depend on clinical course.   - All other workup per primary team. Will followup.     The patient remains at risk of abrupt decompensation.  I have personally provided 20 minutes of critical care time, excluding time spent on separate procedures.

## 2020-03-19 NOTE — PROCEDURE NOTE - ADDITIONAL PROCEDURE DETAILS
Bedside ultrasound done to evaluate placement of bob - bob balloon visualized in the bladder. Gentle Flushing of catheter performed and movement of catheter around in bladder helped to restore flow of bob catheter. [place] tip does not need to be removed at this time.

## 2020-03-19 NOTE — PROGRESS NOTE ADULT - SUBJECTIVE AND OBJECTIVE BOX
Diabetes Follow up note:    Chief complaint: f/u T2DM w/hyperglycemia    Interval Hx: Glucose values mid 100s-low 200s yesterday on basal + correctional scale. Moved out of SICU to floor today. Tolerating POs. Reports good appetite. Off pressors but on high doses of midodrine to maintain BP.     Review of Systems:  General: + hematuria  GI: Tolerating POs. Denies N/V/D/Abd pain  CV: Denies CP/SOB  ENDO: No S&Sx of hypoglycemia  MEDS:  insulin glargine Injectable (LANTUS) 15 Unit(s) SubCutaneous at bedtime  insulin lispro (HumaLOG) corrective regimen sliding scale   SubCutaneous three times a day before meals  insulin lispro (HumaLOG) corrective regimen sliding scale   SubCutaneous at bedtime  insulin lispro Injectable (HumaLOG) 3 Unit(s) SubCutaneous three times a day with meals    ertapenem  IVPB 1000 milliGRAM(s) IV Intermittent every 24 hours  rifAXIMin 550 milliGRAM(s) Oral two times a day    Allergies    codeine (Anaphylaxis)        PE:  General: Male lying in bed. NAD>   Vital Signs Last 24 Hrs  T(C): 36.4 (19 Mar 2020 10:18), Max: 37 (19 Mar 2020 07:00)  T(F): 97.5 (19 Mar 2020 10:18), Max: 98.6 (19 Mar 2020 07:00)  HR: 73 (19 Mar 2020 10:18) (73 - 81)  BP: 106/66 (19 Mar 2020 10:18) (97/53 - 114/56)  BP(mean): 77 (19 Mar 2020 07:00) (70 - 88)  RR: 18 (19 Mar 2020 10:18) (8 - 41)  SpO2: 99% (19 Mar 2020 10:18) (93% - 100%)  Abd: Soft, NT,ND, Obese.   : [place] catheter in place.   Extremities: Warm  Neuro: A&O X3    LABS:  POCT Blood Glucose.: 210 mg/dL (03-18-20 @ 22:21)  POCT Blood Glucose.: 190 mg/dL (03-18-20 @ 17:53)  POCT Blood Glucose.: 159 mg/dL (03-18-20 @ 12:15)  POCT Blood Glucose.: 212 mg/dL (03-17-20 @ 22:32)  POCT Blood Glucose.: 174 mg/dL (03-17-20 @ 16:35)  POCT Blood Glucose.: 139 mg/dL (03-17-20 @ 12:28)  POCT Blood Glucose.: 95 mg/dL (03-17-20 @ 08:10)  POCT Blood Glucose.: 83 mg/dL (03-17-20 @ 03:38)  POCT Blood Glucose.: 82 mg/dL (03-17-20 @ 01:31)  POCT Blood Glucose.: 84 mg/dL (03-17-20 @ 00:08)  POCT Blood Glucose.: 102 mg/dL (03-16-20 @ 23:00)  POCT Blood Glucose.: 112 mg/dL (03-16-20 @ 22:03)  POCT Blood Glucose.: 129 mg/dL (03-16-20 @ 21:13)  POCT Blood Glucose.: 134 mg/dL (03-16-20 @ 20:09)  POCT Blood Glucose.: 182 mg/dL (03-16-20 @ 18:58)  POCT Blood Glucose.: 207 mg/dL (03-16-20 @ 18:04)  POCT Blood Glucose.: 205 mg/dL (03-16-20 @ 17:25)  POCT Blood Glucose.: 284 mg/dL (03-16-20 @ 15:55)  POCT Blood Glucose.: 286 mg/dL (03-16-20 @ 14:53)  POCT Blood Glucose.: 316 mg/dL (03-16-20 @ 14:17)  POCT Blood Glucose.: 292 mg/dL (03-16-20 @ 12:48)  POCT Blood Glucose.: 317 mg/dL (03-16-20 @ 12:02)                            7.9    6.67  )-----------( 53       ( 19 Mar 2020 06:58 )             23.2       03-19    136  |  101  |  21  ----------------------------<  143<H>  5.1   |  26  |  0.84    Ca    9.4      19 Mar 2020 06:55  Phos  2.5     03-19  Mg     1.8     03-19    TPro  5.3<L>  /  Alb  3.0<L>  /  TBili  8.2<H>  /  DBili  1.7<H>  /  AST  54<H>  /  ALT  38  /  AlkPhos  116  03-18      Thyroid Function Tests:  03-11 @ 12:37 TSH 0.97 FreeT4 -- T3 -- Anti TPO -- Anti Thyroglobulin Ab -- TSI --      Hemoglobin A1C, Whole Blood: 6.4 % <H> [4.0 - 5.6] (02-12-20 @ 17:08)          Contact number: kit [phone] or [phone]

## 2020-03-19 NOTE — PROGRESS NOTE ADULT - PROBLEM SELECTOR PLAN 1
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 Coli.    - C/w ertapenem   - s/p TURP and drainage of abscess  - ID following, recs appreciated  - Urology following, recs appreciated. Pt's Bob flushed today. Do not remove bob catheter for 4+ weeks as pt heals.

## 2020-03-20 LAB
ALBUMIN SERPL ELPH-MCNC: 2.9 G/DL — LOW (ref 3.3–5)
ALP SERPL-CCNC: 128 U/L — HIGH (ref 40–120)
ALT FLD-CCNC: 31 U/L — SIGNIFICANT CHANGE UP (ref 10–45)
ANION GAP SERPL CALC-SCNC: 8 MMOL/L — SIGNIFICANT CHANGE UP (ref 5–17)
APTT BLD: 51.1 SEC — HIGH (ref 27.5–36.3)
AST SERPL-CCNC: 39 U/L — SIGNIFICANT CHANGE UP (ref 10–40)
BASOPHILS # BLD AUTO: 0 K/UL — SIGNIFICANT CHANGE UP (ref 0–0.2)
BASOPHILS NFR BLD AUTO: 0 % — SIGNIFICANT CHANGE UP (ref 0–2)
BILIRUB SERPL-MCNC: 7.8 MG/DL — HIGH (ref 0.2–1.2)
BUN SERPL-MCNC: 21 MG/DL — SIGNIFICANT CHANGE UP (ref 7–23)
CALCIUM SERPL-MCNC: 9.6 MG/DL — SIGNIFICANT CHANGE UP (ref 8.4–10.5)
CHLORIDE SERPL-SCNC: 100 MMOL/L — SIGNIFICANT CHANGE UP (ref 96–108)
CO2 SERPL-SCNC: 24 MMOL/L — SIGNIFICANT CHANGE UP (ref 22–31)
CREAT SERPL-MCNC: 1.05 MG/DL — SIGNIFICANT CHANGE UP (ref 0.5–1.3)
CULTURE RESULTS: SIGNIFICANT CHANGE UP
CULTURE RESULTS: SIGNIFICANT CHANGE UP
EOSINOPHIL # BLD AUTO: 0.05 K/UL — SIGNIFICANT CHANGE UP (ref 0–0.5)
EOSINOPHIL NFR BLD AUTO: 0.9 % — SIGNIFICANT CHANGE UP (ref 0–6)
GLUCOSE BLDC GLUCOMTR-MCNC: 128 MG/DL — HIGH (ref 70–99)
GLUCOSE BLDC GLUCOMTR-MCNC: 143 MG/DL — HIGH (ref 70–99)
GLUCOSE BLDC GLUCOMTR-MCNC: 164 MG/DL — HIGH (ref 70–99)
GLUCOSE BLDC GLUCOMTR-MCNC: 170 MG/DL — HIGH (ref 70–99)
GLUCOSE SERPL-MCNC: 126 MG/DL — HIGH (ref 70–99)
HCT VFR BLD CALC: 21.7 % — LOW (ref 39–50)
HGB BLD-MCNC: 7.1 G/DL — LOW (ref 13–17)
INR BLD: 2.18 RATIO — HIGH (ref 0.88–1.16)
LYMPHOCYTES # BLD AUTO: 1.05 K/UL — SIGNIFICANT CHANGE UP (ref 1–3.3)
LYMPHOCYTES # BLD AUTO: 17.5 % — SIGNIFICANT CHANGE UP (ref 13–44)
MAGNESIUM SERPL-MCNC: 1.9 MG/DL — SIGNIFICANT CHANGE UP (ref 1.6–2.6)
MCHC RBC-ENTMCNC: 32.7 GM/DL — SIGNIFICANT CHANGE UP (ref 32–36)
MCHC RBC-ENTMCNC: 32.9 PG — SIGNIFICANT CHANGE UP (ref 27–34)
MCV RBC AUTO: 100.5 FL — HIGH (ref 80–100)
MONOCYTES # BLD AUTO: 0.16 K/UL — SIGNIFICANT CHANGE UP (ref 0–0.9)
MONOCYTES NFR BLD AUTO: 2.6 % — SIGNIFICANT CHANGE UP (ref 2–14)
NEUTROPHILS # BLD AUTO: 4.69 K/UL — SIGNIFICANT CHANGE UP (ref 1.8–7.4)
NEUTROPHILS NFR BLD AUTO: 78.1 % — HIGH (ref 43–77)
ORGANISM # SPEC MICROSCOPIC CNT: SIGNIFICANT CHANGE UP
ORGANISM # SPEC MICROSCOPIC CNT: SIGNIFICANT CHANGE UP
PHOSPHATE SERPL-MCNC: 2.8 MG/DL — SIGNIFICANT CHANGE UP (ref 2.5–4.5)
PLATELET # BLD AUTO: 57 K/UL — LOW (ref 150–400)
POTASSIUM SERPL-MCNC: 5.1 MMOL/L — SIGNIFICANT CHANGE UP (ref 3.5–5.3)
POTASSIUM SERPL-SCNC: 5.1 MMOL/L — SIGNIFICANT CHANGE UP (ref 3.5–5.3)
PROT SERPL-MCNC: 5.2 G/DL — LOW (ref 6–8.3)
PROTHROM AB SERPL-ACNC: 25.4 SEC — HIGH (ref 10–12.9)
RBC # BLD: 2.16 M/UL — LOW (ref 4.2–5.8)
RBC # FLD: 20.5 % — HIGH (ref 10.3–14.5)
SODIUM SERPL-SCNC: 132 MMOL/L — LOW (ref 135–145)
SPECIMEN SOURCE: SIGNIFICANT CHANGE UP
SPECIMEN SOURCE: SIGNIFICANT CHANGE UP
WBC # BLD: 6 K/UL — SIGNIFICANT CHANGE UP (ref 3.8–10.5)
WBC # FLD AUTO: 6 K/UL — SIGNIFICANT CHANGE UP (ref 3.8–10.5)

## 2020-03-20 PROCEDURE — 99291 CRITICAL CARE FIRST HOUR: CPT

## 2020-03-20 PROCEDURE — 99232 SBSQ HOSP IP/OBS MODERATE 35: CPT

## 2020-03-20 PROCEDURE — 93010 ELECTROCARDIOGRAM REPORT: CPT

## 2020-03-20 PROCEDURE — 99233 SBSQ HOSP IP/OBS HIGH 50: CPT | Mod: GC

## 2020-03-20 RX ORDER — INSULIN LISPRO 100/ML
VIAL (ML) SUBCUTANEOUS
Refills: 0 | Status: DISCONTINUED | OUTPATIENT
Start: 2020-03-20 | End: 2020-03-24

## 2020-03-20 RX ORDER — INSULIN LISPRO 100/ML
VIAL (ML) SUBCUTANEOUS AT BEDTIME
Refills: 0 | Status: DISCONTINUED | OUTPATIENT
Start: 2020-03-20 | End: 2020-03-24

## 2020-03-20 RX ADMIN — Medication 3 UNIT(S): at 08:45

## 2020-03-20 RX ADMIN — TAMSULOSIN HYDROCHLORIDE 0.4 MILLIGRAM(S): 0.4 CAPSULE ORAL at 22:25

## 2020-03-20 RX ADMIN — LACTULOSE 30 GRAM(S): 10 SOLUTION ORAL at 05:28

## 2020-03-20 RX ADMIN — INSULIN GLARGINE 15 UNIT(S): 100 INJECTION, SOLUTION SUBCUTANEOUS at 22:25

## 2020-03-20 RX ADMIN — PANTOPRAZOLE SODIUM 40 MILLIGRAM(S): 20 TABLET, DELAYED RELEASE ORAL at 05:28

## 2020-03-20 RX ADMIN — MIDODRINE HYDROCHLORIDE 30 MILLIGRAM(S): 2.5 TABLET ORAL at 14:57

## 2020-03-20 RX ADMIN — Medication 3 UNIT(S): at 13:46

## 2020-03-20 RX ADMIN — Medication 1: at 17:26

## 2020-03-20 RX ADMIN — MIDODRINE HYDROCHLORIDE 30 MILLIGRAM(S): 2.5 TABLET ORAL at 22:27

## 2020-03-20 RX ADMIN — MIDODRINE HYDROCHLORIDE 30 MILLIGRAM(S): 2.5 TABLET ORAL at 05:27

## 2020-03-20 RX ADMIN — LACTULOSE 30 GRAM(S): 10 SOLUTION ORAL at 22:25

## 2020-03-20 RX ADMIN — ERTAPENEM SODIUM 120 MILLIGRAM(S): 1 INJECTION, POWDER, LYOPHILIZED, FOR SOLUTION INTRAMUSCULAR; INTRAVENOUS at 18:40

## 2020-03-20 RX ADMIN — Medication 1 MILLIGRAM(S): at 13:49

## 2020-03-20 RX ADMIN — LACTULOSE 30 GRAM(S): 10 SOLUTION ORAL at 13:45

## 2020-03-20 NOTE — PROGRESS NOTE ADULT - ATTENDING COMMENTS
64M with cirrhosis  Prostate abscess  s/p drainage 3/15   plan for 4 weeks of ertapenem from drainage  ok to place midline   Recommend CBC with Diff and CMP qWeekly while on antimicrobials. Please have results faxed to [phone]  Patient was advised to follow up with me in the Infectious Diseases Office in 3-4 weeks time.   Office contact number of [phone]    I will continue to follow. Please feel free to contact me with any further questions.    Eusebio Pérez M.D.  [facility] Division of Infectious Disease  8AM-5PM: Pager Number [phone]  After Hours (or if no response): Please contact the Infectious Diseases Office at [phone]     The above assessment and plan were discussed with medicine resident

## 2020-03-20 NOTE — PROGRESS NOTE ADULT - ASSESSMENT
64M with DM2,  dyslipidemia, obesity, BPH, Orthostatic hypotension, GERD, HTN, Neuropathy, Obesity, HFpEF with mild LV diastolic dysfunction, and decompensated JEAN cirrhosis complicated by ascites, history of SBP, hepatic encephalopathy, and chronic anemia with a history of duodenal ulcer as well as GAVE and duodenal AVM s/p APC (last on 10/11/19), who is UNOS listed for liver transplantation at [facility] who presents with fall at home, general weakness and ? syncope in setting of UTI/abscess.  He is s/p TURP, and now gross hematuria.  Had AMS and hypotension, likely a component of both septic and hypovolemic shock.  s/p mm blood products, and not on IV pressor support    - Continue to trend H/H and transfuse as needed  - hgb lower today with hematuria  - pressors off, but is very high dose midodrine    - Hold diuretics as not vol ol  - Avoid QTC prolonging drugs  - Monitor and replete electrolytes. Keep K>4.0 and Mg>2.0. Replete Mg today  - cont abx  - check daily ekg  - No signs of significant ischemia. Cath with non obs disease  - echo in 2/2020 with normal LV function. No need to repeat.    - Further cardiac workup will depend on clinical course.   - All other workup per primary team. Will followup.     The patient remains at risk of abrupt decompensation.  I have personally provided 30 minutes of critical care time, excluding time spent on separate procedures.

## 2020-03-20 NOTE — PROGRESS NOTE ADULT - ASSESSMENT
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 He has had multiple recent hospitalizations, including hospitalization from 2/11/20-2/21/20 due to septic shock secondary to emphysematous cystitis and ESBL E. coli bacteremia and from 2/28/20-3/3/20 after a mechanical fall. He is currently re-admitted after another mechanical fall likely preceded by vasovagal syncope, with evidence of urosepsis as well as mild hepatic encephalopathy and mild MISA.    Impression:  #JEAN Cirrhosis: UNOS listed for liver transplant. MELD-Na 26 on 3/20/20  Decompensated by esophageal varices, ascites, h/o hepatic encephalopathy and h/o SBP.   -Ascites: Trace on CT abdomen/pelvis from 02/11/20  -Varices: Small non-bleeding EV on EGD from 12/2019.  -HCC: No focal liver lesions on MRI abdomen 12/2019.   -PSE: No asterixis.     #Recurrent urosepsis with  large prostatic abscess.  Emphesematous cystitis seen on during prior hospitalization with hx of ESBL Ecoli. CT scan during this admission showing enlarged prostate concerning for abscess. There is concern for possible colovesicular fistula.  Bcx from 3/10 negative  Ucx from 3/10 showing >100,000 ESBL Ecoli  CT pelvis with IV contrast showing diffusely enlarged prostate with extravasation of contrast concerning for fistula  s/p OR for I&D of prostate abscess on 3/15      #Recurrent falls: Differential includes deconditioning vs. diabetic neuropathy.   Previous work up from previous hospitalization:    -TTE unremarkable this admission, less likely cardiac cause. CT head without acute neurologic findings.   -Mental status at baseline on lactulose    Recommendations:  - cw with IV abx  - ID and urology following, f/u recommendations  - trend CBC and fever curve daily  - Hold diuretics for now  - Continue with midodrine 30mg TID  - Continue Lactulose and Rifaximin

## 2020-03-20 NOTE — PROGRESS NOTE ADULT - ATTENDING COMMENTS
Kala Rios (pager [phone])  On evenings and weekends, please call [phone] or page endocrine fellow on call.   Please note that this patient may be followed by different provider tomorrow. If no answer, contact endocrine fellow on call.

## 2020-03-20 NOTE — PROGRESS NOTE ADULT - ATTENDING COMMENTS
Hepatology Staff: Yomi Medina MD    I saw and examined the patient along with  Dr. Motta 03-20-20.    Patient Medical Record, hospital course was reviewed and summarized as below:    Vitals: Vital Signs Last 24 Hrs  T(C): 36.9 (20 Mar 2020 09:33), Max: 36.9 (19 Mar 2020 16:38)  T(F): 98.4 (20 Mar 2020 09:33), Max: 98.5 (19 Mar 2020 16:38)  HR: 78 (20 Mar 2020 09:33) (71 - 84)  BP: 105/62 (20 Mar 2020 09:33) (96/57 - 124/70)    RR: 18 (20 Mar 2020 09:33) (18 - 18)  SpO2: 98% (20 Mar 2020 09:33) (98% - 99%)    Labs:Creatinine, Serum: 1.05 mg/dL (03-20-20 @ 07:18)  Bilirubin Total, Serum: 7.8 mg/dL (03-20-20 @ 07:18)  INR: 2.18 ratio (03-20-20 @ 07:18)    I/O: I&O's Summary    19 Mar 2020 07:01  -  20 Mar 2020 07:00  --------------------------------------------------------  IN: 1540 mL / OUT: 2970 mL / NET: -1430 mL    20 Mar 2020 07:01  -  20 Mar 2020 10:55  --------------------------------------------------------  IN: 120 mL / OUT: 275 mL / NET: -155 mL      Nutritional Status:   Albumin, Serum: 2.9 g/dL (03-20-20 @ 07:18)    Last 24 hour events: Stable and improving on IV ERTAPENEM.    Recommendations: Continue with current IV antibiotics. Supportive care, PT/OT. Keep patient listed for liver transplant.    Plan discussed with Primary team.

## 2020-03-20 NOTE — PROGRESS NOTE ADULT - PROBLEM SELECTOR PLAN 1
-test BG AC/HS  -c/w Lantus 15 units QHS (fasting glucose at goal)  -Start Humalog 3 units AC meals for now. Will titrate up based on BG values.   -c/w Humalog moderate correction scale AC and Mod HS scale  -Discharge on basal/bolus  -f/u outpt w/Dr Goldberg -test BG AC/HS  -c/w Lantus 15 units QHS (fasting glucose at goal)  -Will discontinue premeal as patient has variable eating pattern.  -Decrease to Humalog low correction scale AC and low HS scale  -Discharge on basal/bolus  -f/u outpt w/Dr Goldberg

## 2020-03-20 NOTE — PROGRESS NOTE ADULT - SUBJECTIVE AND OBJECTIVE BOX
Chief Complaint:     History:    MEDICATIONS  (STANDING):  chlorhexidine 2% Cloths 1 Application(s) Topical <User Schedule>  dextrose 5%. 1000 milliLiter(s) (50 mL/Hr) IV Continuous <Continuous>  dextrose 50% Injectable 12.5 Gram(s) IV Push once  dextrose 50% Injectable 25 Gram(s) IV Push once  dextrose 50% Injectable 25 Gram(s) IV Push once  ertapenem  IVPB 1000 milliGRAM(s) IV Intermittent every 24 hours  folic acid 1 milliGRAM(s) Oral daily  insulin glargine Injectable (LANTUS) 15 Unit(s) SubCutaneous at bedtime  insulin lispro (HumaLOG) corrective regimen sliding scale   SubCutaneous three times a day before meals  insulin lispro (HumaLOG) corrective regimen sliding scale   SubCutaneous at bedtime  insulin lispro Injectable (HumaLOG) 3 Unit(s) SubCutaneous three times a day with meals  lactulose Syrup 30 Gram(s) Oral three times a day  midodrine. 30 milliGRAM(s) Oral every 8 hours  pantoprazole    Tablet 40 milliGRAM(s) Oral before breakfast  rifAXIMin 550 milliGRAM(s) Oral two times a day  tamsulosin 0.4 milliGRAM(s) Oral at bedtime    MEDICATIONS  (PRN):  dextrose 40% Gel 15 Gram(s) Oral once PRN Blood Glucose LESS THAN 70 milliGRAM(s)/deciLiter  glucagon  Injectable 1 milliGRAM(s) IntraMuscular once PRN Glucose <70 milliGRAM(s)/deciLiter      Allergies    codeine (Anaphylaxis)    Intolerances      Review of Systems:  Constitutional: No fever  Eyes: No blurry vision  Neuro: No tremors  HEENT: No pain  Cardiovascular: No chest pain, palpitations  Respiratory: No SOB, no cough  GI: No nausea, vomiting, abdominal pain  : No dysuria  Skin: no rash  Psych: no depression  Endocrine: no polyuria, polydipsia  Hem/lymph: no swelling  Osteoporosis: no fractures    ALL OTHER SYSTEMS REVIEWED AND NEGATIVE    UNABLE TO OBTAIN    PHYSICAL EXAM:  VITALS: T(C): 36.4 (03-20-20 @ 11:58)  T(F): 97.6 (03-20-20 @ 11:58), Max: 98.5 (03-19-20 @ 16:38)  HR: 76 (03-20-20 @ 11:58) (71 - 83)  BP: 109/63 (03-20-20 @ 11:58) (96/57 - 124/70)  RR:  (17 - 18)  SpO2:  (97% - 99%)  Wt(kg): --  GENERAL: NAD, well-groomed, well-developed  EYES: No proptosis, no lid lag, anicteric  HEENT:  Atraumatic, Normocephalic, moist mucous membranes  THYROID: Normal size, no palpable nodules  RESPIRATORY: Clear to auscultation bilaterally; No rales, rhonchi, wheezing, or rubs  CARDIOVASCULAR: Regular rate and rhythm; No murmurs; no peripheral edema  GI: Soft, nontender, non distended, normal bowel sounds  SKIN: Dry, intact, No rashes or lesions  MUSCULOSKELETAL: Full range of motion, normal strength  NEURO: sensation intact, extraocular movements intact, no tremor, normal reflexes  PSYCH: Alert and oriented x 3, normal affect, normal mood  CUSHING'S SIGNS: no striae    POCT Blood Glucose.: 143 mg/dL (03-20-20 @ 12:39)  POCT Blood Glucose.: 128 mg/dL (03-20-20 @ 08:34)  POCT Blood Glucose.: 176 mg/dL (03-19-20 @ 21:41)  POCT Blood Glucose.: 238 mg/dL (03-19-20 @ 17:34)  POCT Blood Glucose.: 176 mg/dL (03-19-20 @ 13:03)  POCT Blood Glucose.: 210 mg/dL (03-18-20 @ 22:21)  POCT Blood Glucose.: 190 mg/dL (03-18-20 @ 17:53)  POCT Blood Glucose.: 159 mg/dL (03-18-20 @ 12:15)  POCT Blood Glucose.: 212 mg/dL (03-17-20 @ 22:32)  POCT Blood Glucose.: 174 mg/dL (03-17-20 @ 16:35)      03-20    132<L>  |  100  |  21  ----------------------------<  126<H>  5.1   |  24  |  1.05    EGFR if : 87  EGFR if non : 75    Ca    9.6      03-20  Mg     1.9     03-20  Phos  2.8     03-20    TPro  5.2<L>  /  Alb  2.9<L>  /  TBili  7.8<H>  /  DBili  x   /  AST  39  /  ALT  31  /  AlkPhos  128<H>  03-20          Thyroid Function Tests:  03-11 @ 12:37 TSH 0.97 FreeT4 -- T3 -- Anti TPO -- Anti Thyroglobulin Ab -- TSI --      Hemoglobin A1C, Whole Blood: 6.4 % <H> [4.0 - 5.6] (02-12-20 @ 17:08) Chief Complaint: T2DM/Concern for AI    History: Patient's BG levels are well controlled. Patient denies lightheadedness or dizziness. He is tolerating po though does not have much appetite and is eating variably.     MEDICATIONS  (STANDING):  chlorhexidine 2% Cloths 1 Application(s) Topical <User Schedule>  dextrose 5%. 1000 milliLiter(s) (50 mL/Hr) IV Continuous <Continuous>  dextrose 50% Injectable 12.5 Gram(s) IV Push once  dextrose 50% Injectable 25 Gram(s) IV Push once  dextrose 50% Injectable 25 Gram(s) IV Push once  ertapenem  IVPB 1000 milliGRAM(s) IV Intermittent every 24 hours  folic acid 1 milliGRAM(s) Oral daily  insulin glargine Injectable (LANTUS) 15 Unit(s) SubCutaneous at bedtime  insulin lispro (HumaLOG) corrective regimen sliding scale   SubCutaneous three times a day before meals  insulin lispro (HumaLOG) corrective regimen sliding scale   SubCutaneous at bedtime  insulin lispro Injectable (HumaLOG) 3 Unit(s) SubCutaneous three times a day with meals  lactulose Syrup 30 Gram(s) Oral three times a day  midodrine. 30 milliGRAM(s) Oral every 8 hours  pantoprazole    Tablet 40 milliGRAM(s) Oral before breakfast  rifAXIMin 550 milliGRAM(s) Oral two times a day  tamsulosin 0.4 milliGRAM(s) Oral at bedtime    MEDICATIONS  (PRN):  dextrose 40% Gel 15 Gram(s) Oral once PRN Blood Glucose LESS THAN 70 milliGRAM(s)/deciLiter  glucagon  Injectable 1 milliGRAM(s) IntraMuscular once PRN Glucose <70 milliGRAM(s)/deciLiter      Allergies    codeine (Anaphylaxis)    Intolerances      Review of Systems:  Constitutional: No fever  Eyes: No blurry vision  Neuro: No tremors  HEENT: No pain  Cardiovascular: No chest pain, palpitations  Respiratory: No SOB, no cough  GI: No nausea, vomiting, abdominal pain  : No dysuria  Skin: no rash  Psych: no depression  Endocrine: no polyuria, polydipsia      ALL OTHER SYSTEMS REVIEWED AND NEGATIVE        PHYSICAL EXAM:  VITALS: T(C): 36.4 (03-20-20 @ 11:58)  T(F): 97.6 (03-20-20 @ 11:58), Max: 98.5 (03-19-20 @ 16:38)  HR: 76 (03-20-20 @ 11:58) (71 - 83)  BP: 109/63 (03-20-20 @ 11:58) (96/57 - 124/70)  RR:  (17 - 18)  SpO2:  (97% - 99%)  Wt(kg): --  GENERAL: NAD, well-groomed, well-developed  EYES: No proptosis, no lid lag, anicteric  HEENT:  Atraumatic, Normocephalic, moist mucous membranes  RESPIRATORY: Clear to auscultation bilaterally; No rales, rhonchi, wheezing, or rubs  CARDIOVASCULAR: Regular rate and rhythm; No murmurs  SKIN: Dry, intact, No rashes or lesions  PSYCH: Alert and oriented x 3, normal affect, normal mood      POCT Blood Glucose.: 143 mg/dL (03-20-20 @ 12:39)  POCT Blood Glucose.: 128 mg/dL (03-20-20 @ 08:34)  POCT Blood Glucose.: 176 mg/dL (03-19-20 @ 21:41)  POCT Blood Glucose.: 238 mg/dL (03-19-20 @ 17:34)  POCT Blood Glucose.: 176 mg/dL (03-19-20 @ 13:03)  POCT Blood Glucose.: 210 mg/dL (03-18-20 @ 22:21)  POCT Blood Glucose.: 190 mg/dL (03-18-20 @ 17:53)  POCT Blood Glucose.: 159 mg/dL (03-18-20 @ 12:15)  POCT Blood Glucose.: 212 mg/dL (03-17-20 @ 22:32)  POCT Blood Glucose.: 174 mg/dL (03-17-20 @ 16:35)      03-20    132<L>  |  100  |  21  ----------------------------<  126<H>  5.1   |  24  |  1.05    EGFR if : 87  EGFR if non : 75    Ca    9.6      03-20  Mg     1.9     03-20  Phos  2.8     03-20    TPro  5.2<L>  /  Alb  2.9<L>  /  TBili  7.8<H>  /  DBili  x   /  AST  39  /  ALT  31  /  AlkPhos  128<H>  03-20          Thyroid Function Tests:  03-11 @ 12:37 TSH 0.97 FreeT4 -- T3 -- Anti TPO -- Anti Thyroglobulin Ab -- TSI --      Hemoglobin A1C, Whole Blood: 6.4 % <H> [4.0 - 5.6] (02-12-20 @ 17:08)

## 2020-03-20 NOTE — PROGRESS NOTE ADULT - SUBJECTIVE AND OBJECTIVE BOX
Chief Complaint:  Patient is a 64y old  Male who presents with a chief complaint of Syncopal episode at home at 0430 this am, LOC for approx 3 minutes per wife, pt had episode of shaking during that time. Hx of multiple falls. Pt reporting abdominal pain for X 1day, no nausea or vomiting, endorses loose stool on lactulose at home (20 Mar 2020 07:49)      Interval Events:   No new complaints this AM    Allergies:  codeine (Anaphylaxis)      Home Medications:    Hospital Medications:  chlorhexidine 2% Cloths 1 Application(s) Topical <User Schedule>  dextrose 40% Gel 15 Gram(s) Oral once PRN  dextrose 5%. 1000 milliLiter(s) IV Continuous <Continuous>  dextrose 50% Injectable 12.5 Gram(s) IV Push once  dextrose 50% Injectable 25 Gram(s) IV Push once  dextrose 50% Injectable 25 Gram(s) IV Push once  ertapenem  IVPB 1000 milliGRAM(s) IV Intermittent every 24 hours  folic acid 1 milliGRAM(s) Oral daily  glucagon  Injectable 1 milliGRAM(s) IntraMuscular once PRN  insulin glargine Injectable (LANTUS) 15 Unit(s) SubCutaneous at bedtime  insulin lispro (HumaLOG) corrective regimen sliding scale   SubCutaneous three times a day before meals  insulin lispro (HumaLOG) corrective regimen sliding scale   SubCutaneous at bedtime  insulin lispro Injectable (HumaLOG) 3 Unit(s) SubCutaneous three times a day with meals  lactulose Syrup 30 Gram(s) Oral three times a day  midodrine. 30 milliGRAM(s) Oral every 8 hours  pantoprazole    Tablet 40 milliGRAM(s) Oral before breakfast  rifAXIMin 550 milliGRAM(s) Oral two times a day  tamsulosin 0.4 milliGRAM(s) Oral at bedtime      PMHX/PSHX:  GIB (gastrointestinal bleeding)  GERD with esophagitis  Hepatic encephalopathy  Obesity  Fatty liver disease, nonalcoholic  Renal stones  Hypertension  Neuropathy  Hypercholesteremia  Diabetes  S/P cholecystectomy  No significant past surgical history      Family history:  Family history of type 2 diabetes mellitus  Family history of hypertension  Family history of stomach cancer  No pertinent family history in first degree relatives      ROS:     General:  No wt loss, fevers, chills, night sweats, fatigue,   Eyes:  Good vision, no reported pain  ENT:  No sore throat, pain, runny nose, dysphagia  CV:  No pain, palpitations, hypo/hypertension  Resp:  No dyspnea, cough, tachypnea, wheezing  GI:  No pain, No nausea, No vomiting, No diarrhea, No constipation, No weight loss, No fever, No pruritis, No rectal bleeding, No tarry stools, No dysphagia,  :  No pain, bleeding, incontinence, nocturia  Muscle:  No pain, weakness  Neuro:  No weakness, tingling, memory problems  Psych:  No fatigue, insomnia, mood problems, depression  Endocrine:  No polyuria, polydipsia, cold/heat intolerance  Heme:  No petechiae, ecchymosis, easy bruisability  Skin:  No rash, tattoos, scars, edema      PHYSICAL EXAM:   Vital Signs:  Vital Signs Last 24 Hrs  T(C): 36.6 (20 Mar 2020 06:52), Max: 36.9 (19 Mar 2020 16:38)  T(F): 97.9 (20 Mar 2020 06:52), Max: 98.5 (19 Mar 2020 16:38)  HR: 78 (20 Mar 2020 06:52) (71 - 84)  BP: 104/59 (20 Mar 2020 06:52) (96/57 - 124/70)  BP(mean): --  RR: 18 (20 Mar 2020 06:52) (18 - 18)  SpO2: 98% (20 Mar 2020 06:52) (98% - 99%)  Daily     Daily     GENERAL:  Appears stated age, well-groomed, well-nourished, no distress  CHEST:  Full & symmetric excursion, no increased effort, breath sounds clear  HEART:  Regular rhythm, S1, S2, no JVD  ABDOMEN:  Soft, non-tender, non-distended, normoactive bowel sounds  EXTREMITIES:  no cyanosis, clubbing or edema  SKIN:  Jaundiced  NEURO:  Alert, oriented    LABS:                        7.1    6.00  )-----------( 57       ( 20 Mar 2020 07:18 )             21.7     03-20    132<L>  |  100  |  21  ----------------------------<  126<H>  5.1   |  24  |  1.05    Ca    9.6      20 Mar 2020 07:18  Phos  2.8     03-20  Mg     1.9     03-20    TPro  5.2<L>  /  Alb  2.9<L>  /  TBili  7.8<H>  /  DBili  x   /  AST  39  /  ALT  31  /  AlkPhos  128<H>  03-20    LIVER FUNCTIONS - ( 20 Mar 2020 07:18 )  Alb: 2.9 g/dL / Pro: 5.2 g/dL / ALK PHOS: 128 U/L / ALT: 31 U/L / AST: 39 U/L / GGT: x           PT/INR - ( 20 Mar 2020 07:18 )   PT: 25.4 sec;   INR: 2.18 ratio         PTT - ( 20 Mar 2020 07:18 )  PTT:51.1 sec        Imaging:

## 2020-03-20 NOTE — PROGRESS NOTE ADULT - SUBJECTIVE AND OBJECTIVE BOX
Subjective:  Pt seen and examined at the bedside.  Bob catheter continues to drain after u/s and irrigation of debris.  Color remains strawberry, no complaints.  Afebrile overnight.      Objective  Vital Signs Last 24 Hrs  T(C): 36.6 (20 Mar 2020 06:52), Max: 36.9 (19 Mar 2020 16:38)  T(F): 97.9 (20 Mar 2020 06:52), Max: 98.5 (19 Mar 2020 16:38)  HR: 78 (20 Mar 2020 06:52) (71 - 84)  BP: 104/59 (20 Mar 2020 06:52) (96/57 - 124/70)  BP(mean): --  RR: 18 (20 Mar 2020 06:52) (18 - 18)  SpO2: 98% (20 Mar 2020 06:52) (98% - 99%)    03-19-20 @ 07:01  -  03-20-20 @ 07:00  --------------------------------------------------------  IN: 1540 mL / OUT: 2970 mL / NET: -1430 mL          PHYSICAL EXAM:  Gen: No Acute Distress. A&Ox3  Abd: Soft, nontender, nondistended.  : No suprapubic tenderness. [place] tip bob in place draining strawberry colored urine, mixed w/ tea color in tubing.      Labs  CBC (03-20 @ 07:18)                              7.1<L>                         6.00    )----------------(  57<L>      --    % Neutrophils, --    % Lymphocytes, ANC: --                                  21.7<L>              CBC (03-19 @ 06:58)                              7.9<L>                         6.67    )----------------(  53<L>      --    % Neutrophils, --    % Lymphocytes, ANC: --                                  23.2<L>                BMP (03-20 @ 07:18)             132<L>  |  100     |  21    		Ca++ --      Ca 9.6                ---------------------------------( 126<H>		Mg 1.9                5.1     |  24      |  1.05  			Ph 2.8     BMP (03-19 @ 06:55)             136     |  101     |  21    		Ca++ --      Ca 9.4                ---------------------------------( 143<H>		Mg 1.8                5.1     |  26      |  0.84  			Ph 2.5       LFTs (03-20 @ 07:18)      TPro 5.2<L> / Alb 2.9<L> / TBili 7.8<H> / DBili -- / AST 39 / ALT 31 / AlkPhos 128<H>  LFTs (03-18 @ 23:14)      TPro 5.3<L> / Alb 3.0<L> / TBili 8.2<H> / DBili 1.7<H> / AST 54<H> / ALT 38 / AlkPhos 116    Coags (03-20 @ 07:18)  aPTT 51.1<H> / INR 2.18<H> / PT 25.4<H>  Coags (03-18 @ 23:14)  aPTT 49.7<H> / INR 2.12<H> / PT 24.9<H>    ABG (03-18 @ 23:13)      /  /  /  /  / %     Lactate:   2.6<H>    VBG (03-18 @ 23:13)     7.38 / 51<H> / 26 / 29 / 4.1<H> / 37<L>%    -> .Urine Clean Catch (Midstream) Culture (03-16 @ 04:48)     NG    NG    Insufficient growth-culture reincubated    -> .Blood Blood-Peripheral Culture (03-16 @ 01:53)     NG    NG    No growth to date.    -> .Abscess prostate abscess Culture (03-15 @ 17:24)     NG    Escherichia coli ESBL    Moderate Escherichia coli ESBL    -> .Urine Clean Catch (Midstream) Culture (03-10 @ 17:23)     NG    Escherichia coli ESBL    >100,000 CFU/ml Escherichia coli ESBL    -> .Blood Blood-Venous Culture (03-10 @ 16:54)     NG    NG    No growth at 5 days.    -> .Blood Blood-Peripheral Culture (03-10 @ 16:53)     NG    NG    No growth at 5 days.          Urine Cx: Culture - Urine (03.16.20 @ 04:48)    Specimen Source: .Urine Clean Catch (Midstream)    Culture Results:   Insufficient growth-culture reincubated

## 2020-03-20 NOTE — PROGRESS NOTE ADULT - PROBLEM SELECTOR PLAN 6
B12 was high in 12/19  Folate was 10.9 in 12/19  - C/w folic acid    - Hemoglobin is currently stable    # Acute blood loss anemia   After cystoscopy and transurethral drainage of prostatic abscess patient developed hypotension, altered mental status and bright red blood in bob bag. Vasopressor support was initiated with Levo and Vaso and MTP was activated, 4 units of FFP and 4units of PRBC were transfused, also received 1unit platelets and vitamin K.  - Hgb currently stable  - Continue to monitor CBCs

## 2020-03-20 NOTE — PROGRESS NOTE ADULT - SUBJECTIVE AND OBJECTIVE BOX
PROGRESS NOTE:   Authored by Gerry Healy MD, PGY 1, Pager [phone] [facility], [id number] [facility]     Patient is a 64y old  Male who presents with a chief complaint of Syncopal episode at home at 0430 this am, LOC for approx 3 minutes per wife, pt had episode of shaking during that time. Hx of multiple falls. Pt reporting abdominal pain for X 1day, no nausea or vomiting, endorses loose stool on lactulose at home (19 Mar 2020 16:16)      SUBJECTIVE / OVERNIGHT EVENTS:      MEDICATIONS  (STANDING):  chlorhexidine 2% Cloths 1 Application(s) Topical <User Schedule>  dextrose 5%. 1000 milliLiter(s) (50 mL/Hr) IV Continuous <Continuous>  dextrose 50% Injectable 12.5 Gram(s) IV Push once  dextrose 50% Injectable 25 Gram(s) IV Push once  dextrose 50% Injectable 25 Gram(s) IV Push once  ertapenem  IVPB 1000 milliGRAM(s) IV Intermittent every 24 hours  folic acid 1 milliGRAM(s) Oral daily  insulin glargine Injectable (LANTUS) 15 Unit(s) SubCutaneous at bedtime  insulin lispro (HumaLOG) corrective regimen sliding scale   SubCutaneous three times a day before meals  insulin lispro (HumaLOG) corrective regimen sliding scale   SubCutaneous at bedtime  insulin lispro Injectable (HumaLOG) 3 Unit(s) SubCutaneous three times a day with meals  lactulose Syrup 30 Gram(s) Oral three times a day  midodrine. 30 milliGRAM(s) Oral every 8 hours  pantoprazole    Tablet 40 milliGRAM(s) Oral before breakfast  rifAXIMin 550 milliGRAM(s) Oral two times a day  tamsulosin 0.4 milliGRAM(s) Oral at bedtime    MEDICATIONS  (PRN):  dextrose 40% Gel 15 Gram(s) Oral once PRN Blood Glucose LESS THAN 70 milliGRAM(s)/deciLiter  glucagon  Injectable 1 milliGRAM(s) IntraMuscular once PRN Glucose <70 milliGRAM(s)/deciLiter      CAPILLARY BLOOD GLUCOSE      POCT Blood Glucose.: 176 mg/dL (19 Mar 2020 21:41)  POCT Blood Glucose.: 238 mg/dL (19 Mar 2020 17:34)  POCT Blood Glucose.: 176 mg/dL (19 Mar 2020 13:03)    I&O's Summary    19 Mar 2020 07:01  -  20 Mar 2020 07:00  --------------------------------------------------------  IN: 1540 mL / OUT: 2970 mL / NET: -1430 mL        PHYSICAL EXAM:  Vital Signs Last 24 Hrs  T(C): 36.6 (20 Mar 2020 06:52), Max: 36.9 (19 Mar 2020 16:38)  T(F): 97.9 (20 Mar 2020 06:52), Max: 98.5 (19 Mar 2020 16:38)  HR: 78 (20 Mar 2020 06:52) (71 - 84)  BP: 104/59 (20 Mar 2020 06:52) (96/57 - 124/70)  BP(mean): --  RR: 18 (20 Mar 2020 06:52) (18 - 18)  SpO2: 98% (20 Mar 2020 06:52) (98% - 99%)    CONSTITUTIONAL: NAD  RESPIRATORY: Normal respiratory effort; lungs are clear to auscultation bilaterally  CARDIOVASCULAR: Regular rate and rhythm, normal S1 and S2, no murmur/rub/gallop  ABDOMEN: Nontender to palpation, Soft, Non-distended, normoactive bowel sounds,   MUSCLOSKELETAL: No LE edema   NEURO: Alert, good concentration     LABS:                        7.9    6.67  )-----------( 53       ( 19 Mar 2020 06:58 )             23.2     03-19    136  |  101  |  21  ----------------------------<  143<H>  5.1   |  26  |  0.84    Ca    9.4      19 Mar 2020 06:55  Phos  2.5     03-19  Mg     1.8     03-19    TPro  5.3<L>  /  Alb  3.0<L>  /  TBili  8.2<H>  /  DBili  1.7<H>  /  AST  54<H>  /  ALT  38  /  AlkPhos  116  03-18    PT/INR - ( 20 Mar 2020 07:18 )   PT: 25.4 sec;   INR: 2.18 ratio         PTT - ( 20 Mar 2020 07:18 )  PTT:51.1 sec            RADIOLOGY & ADDITIONAL TESTS:  Results Reviewed: PROGRESS NOTE:   Authored by Gerry Healy MD, PGY 1, Pager [phone] [facility], [address] [facility]     Patient is a 64y old  Male who presents with a chief complaint of Syncopal episode at home at 0430 this am, LOC for approx 3 minutes per wife, pt had episode of shaking during that time. Hx of multiple falls. Pt reporting abdominal pain for X 1day, no nausea or vomiting, endorses loose stool on lactulose at home (19 Mar 2020 16:16)      SUBJECTIVE / OVERNIGHT EVENTS:      MEDICATIONS  (STANDING):  chlorhexidine 2% Cloths 1 Application(s) Topical <User Schedule>  dextrose 5%. 1000 milliLiter(s) (50 mL/Hr) IV Continuous <Continuous>  dextrose 50% Injectable 12.5 Gram(s) IV Push once  dextrose 50% Injectable 25 Gram(s) IV Push once  dextrose 50% Injectable 25 Gram(s) IV Push once  ertapenem  IVPB 1000 milliGRAM(s) IV Intermittent every 24 hours  folic acid 1 milliGRAM(s) Oral daily  insulin glargine Injectable (LANTUS) 15 Unit(s) SubCutaneous at bedtime  insulin lispro (HumaLOG) corrective regimen sliding scale   SubCutaneous three times a day before meals  insulin lispro (HumaLOG) corrective regimen sliding scale   SubCutaneous at bedtime  insulin lispro Injectable (HumaLOG) 3 Unit(s) SubCutaneous three times a day with meals  lactulose Syrup 30 Gram(s) Oral three times a day  midodrine. 30 milliGRAM(s) Oral every 8 hours  pantoprazole    Tablet 40 milliGRAM(s) Oral before breakfast  rifAXIMin 550 milliGRAM(s) Oral two times a day  tamsulosin 0.4 milliGRAM(s) Oral at bedtime    MEDICATIONS  (PRN):  dextrose 40% Gel 15 Gram(s) Oral once PRN Blood Glucose LESS THAN 70 milliGRAM(s)/deciLiter  glucagon  Injectable 1 milliGRAM(s) IntraMuscular once PRN Glucose <70 milliGRAM(s)/deciLiter      CAPILLARY BLOOD GLUCOSE      POCT Blood Glucose.: 176 mg/dL (19 Mar 2020 21:41)  POCT Blood Glucose.: 238 mg/dL (19 Mar 2020 17:34)  POCT Blood Glucose.: 176 mg/dL (19 Mar 2020 13:03)    I&O's Summary    19 Mar 2020 07:01  -  20 Mar 2020 07:00  --------------------------------------------------------  IN: 1540 mL / OUT: 2970 mL / NET: -1430 mL        PHYSICAL EXAM:  Vital Signs Last 24 Hrs  T(C): 36.6 (20 Mar 2020 06:52), Max: 36.9 (19 Mar 2020 16:38)  T(F): 97.9 (20 Mar 2020 06:52), Max: 98.5 (19 Mar 2020 16:38)  HR: 78 (20 Mar 2020 06:52) (71 - 84)  BP: 104/59 (20 Mar 2020 06:52) (96/57 - 124/70)  BP(mean): --  RR: 18 (20 Mar 2020 06:52) (18 - 18)  SpO2: 98% (20 Mar 2020 06:52) (98% - 99%)    CONSTITUTIONAL: NAD  RESPIRATORY: Normal respiratory effort; lungs are clear to auscultation bilaterally  CARDIOVASCULAR: Regular rate and rhythm, normal S1 and S2, no murmur/rub/gallop  ABDOMEN: Nontender to palpation, Soft, Non-distended, normoactive bowel sounds,   MUSCLOSKELETAL: No LE edema   NEURO: Alert, good concentration     LABS:                        7.9    6.67  )-----------( 53       ( 19 Mar 2020 06:58 )             23.2     03-19    136  |  101  |  21  ----------------------------<  143<H>  5.1   |  26  |  0.84    Ca    9.4      19 Mar 2020 06:55  Phos  2.5     03-19  Mg     1.8     03-19    TPro  5.3<L>  /  Alb  3.0<L>  /  TBili  8.2<H>  /  DBili  1.7<H>  /  AST  54<H>  /  ALT  38  /  AlkPhos  116  03-18    PT/INR - ( 20 Mar 2020 07:18 )   PT: 25.4 sec;   INR: 2.18 ratio         PTT - ( 20 Mar 2020 07:18 )  PTT:51.1 sec            RADIOLOGY & ADDITIONAL TESTS:  Results Reviewed. PROGRESS NOTE:   Authored by Gerry Healy MD, PGY 1, Pager [phone] [facility], [address] [facility]     Patient is a 64y old  Male who presents with a chief complaint of Syncopal episode at home at 0430 this am, LOC for approx 3 minutes per wife, pt had episode of shaking during that time. Hx of multiple falls. Pt reporting abdominal pain for X 1day, no nausea or vomiting, endorses loose stool on lactulose at home (19 Mar 2020 16:16)      SUBJECTIVE / OVERNIGHT EVENTS: Pt seen and examined at bedside. Pt denies chest pain, SOB, abd pain, nausea or vomiting.       MEDICATIONS  (STANDING):  chlorhexidine 2% Cloths 1 Application(s) Topical <User Schedule>  dextrose 5%. 1000 milliLiter(s) (50 mL/Hr) IV Continuous <Continuous>  dextrose 50% Injectable 12.5 Gram(s) IV Push once  dextrose 50% Injectable 25 Gram(s) IV Push once  dextrose 50% Injectable 25 Gram(s) IV Push once  ertapenem  IVPB 1000 milliGRAM(s) IV Intermittent every 24 hours  folic acid 1 milliGRAM(s) Oral daily  insulin glargine Injectable (LANTUS) 15 Unit(s) SubCutaneous at bedtime  insulin lispro (HumaLOG) corrective regimen sliding scale   SubCutaneous three times a day before meals  insulin lispro (HumaLOG) corrective regimen sliding scale   SubCutaneous at bedtime  insulin lispro Injectable (HumaLOG) 3 Unit(s) SubCutaneous three times a day with meals  lactulose Syrup 30 Gram(s) Oral three times a day  midodrine. 30 milliGRAM(s) Oral every 8 hours  pantoprazole    Tablet 40 milliGRAM(s) Oral before breakfast  rifAXIMin 550 milliGRAM(s) Oral two times a day  tamsulosin 0.4 milliGRAM(s) Oral at bedtime    MEDICATIONS  (PRN):  dextrose 40% Gel 15 Gram(s) Oral once PRN Blood Glucose LESS THAN 70 milliGRAM(s)/deciLiter  glucagon  Injectable 1 milliGRAM(s) IntraMuscular once PRN Glucose <70 milliGRAM(s)/deciLiter      CAPILLARY BLOOD GLUCOSE      POCT Blood Glucose.: 176 mg/dL (19 Mar 2020 21:41)  POCT Blood Glucose.: 238 mg/dL (19 Mar 2020 17:34)  POCT Blood Glucose.: 176 mg/dL (19 Mar 2020 13:03)    I&O's Summary    19 Mar 2020 07:01  -  20 Mar 2020 07:00  --------------------------------------------------------  IN: 1540 mL / OUT: 2970 mL / NET: -1430 mL        PHYSICAL EXAM:  Vital Signs Last 24 Hrs  T(C): 36.6 (20 Mar 2020 06:52), Max: 36.9 (19 Mar 2020 16:38)  T(F): 97.9 (20 Mar 2020 06:52), Max: 98.5 (19 Mar 2020 16:38)  HR: 78 (20 Mar 2020 06:52) (71 - 84)  BP: 104/59 (20 Mar 2020 06:52) (96/57 - 124/70)  BP(mean): --  RR: 18 (20 Mar 2020 06:52) (18 - 18)  SpO2: 98% (20 Mar 2020 06:52) (98% - 99%)    CONSTITUTIONAL: NAD  RESPIRATORY: Normal respiratory effort; lungs are clear to auscultation bilaterally  CARDIOVASCULAR: Regular rate and rhythm, normal S1 and S2, no murmur/rub/gallop  ABDOMEN: Large abd, nontender to palpation, Soft, + bowel sounds   MUSCLOSKELETAL: No LE edema   NEURO: Alert, good concentration     LABS:                        7.9    6.67  )-----------( 53       ( 19 Mar 2020 06:58 )             23.2     03-19    136  |  101  |  21  ----------------------------<  143<H>  5.1   |  26  |  0.84    Ca    9.4      19 Mar 2020 06:55  Phos  2.5     03-19  Mg     1.8     03-19    TPro  5.3<L>  /  Alb  3.0<L>  /  TBili  8.2<H>  /  DBili  1.7<H>  /  AST  54<H>  /  ALT  38  /  AlkPhos  116  03-18    PT/INR - ( 20 Mar 2020 07:18 )   PT: 25.4 sec;   INR: 2.18 ratio         PTT - ( 20 Mar 2020 07:18 )  PTT:51.1 sec            RADIOLOGY & ADDITIONAL TESTS:  Results Reviewed.

## 2020-03-20 NOTE — PROVIDER CONTACT NOTE (OTHER) - ASSESSMENT
Patient A&Ox4, no c/o of pain. blood tinged urine leaking from bob, olga red blood noted i Patient A&Ox4, no c/o of pain. blood tinged urine leaking from bob, olga red blood noted in collection bag

## 2020-03-20 NOTE — PROGRESS NOTE ADULT - ATTENDING COMMENTS
Seen, examined the patient  Resting in bed, afebrile, no cough, SOB or abdominal pain, weak  - Reviewed labs, imaging  - Admitted in MICU for MICU with septic shock due to prostatic abscess, s/p drain by urology, blood c/s neg    ID consult and plan noted, c/w IV Ertapenem 1gm/d for 4 weeks  - Transplant hepatology consult appreciated  - Ordered Midline, spoke to  for d/c on IV anbx    c/w current meds, Midodrine taper  - PT eval, OOB to chair

## 2020-03-20 NOTE — PROGRESS NOTE ADULT - SUBJECTIVE AND OBJECTIVE BOX
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 syncope.  Patient states that he was doing ok after dc from hospital and about Saturday 3/7, he started feeling generally weak and had diarrhea where he was moving his BM every 15 mns and his wife told him to hold the Lactulose which he did.  He was was having the constant urge to urinate .  However, yesterday , the Diarrhea stopped but he could not urinate.  Pt denies any nausea, vomiting, abd pain , no cough, no fever or chills or chest pain .  NO travel of sick contact.  NO visitors to his house ( of note, patient's youngest child in high school was told to stay at home for 14 days from school , not because of known exposure as per father .  It is because he did not go to school because he had some knee pain for one day as per father.    ED   :   Vitals are stable Pt received NS 500ml and one dose of Ertapenem due to abnormal urinalysis, blood and UCX were sent    Previous admissions :  2/28/20 - 3/03/20 for falls where SANTANA  was recommended but pt declined and was sent home .  Pt was on Lasix 40 mg daily alternating with 20mg which was changed to Lasix 20 mg oral daily and Midodrine increased from 20 mg oral THree times daily to 30 mg three times a day. (10 Mar 2020 15:41)    He is awake and alert this morning with no cardiac events overnight. He reports no chest pain, dyspnea, or palpitations      PAST MEDICAL & SURGICAL HISTORY:  GIB (gastrointestinal bleeding)  GERD with esophagitis: Gastritis &amp; Non Bleeding Ulcers  Hepatic encephalopathy  Obesity  Fatty liver disease, nonalcoholic  Renal stones: 25 years ago  Hypertension  Neuropathy  Hypercholesteremia  Diabetes  S/P cholecystectomy      MEDICATIONS  (STANDING):  chlorhexidine 2% Cloths 1 Application(s) Topical <User Schedule>  dextrose 5%. 1000 milliLiter(s) (50 mL/Hr) IV Continuous <Continuous>  dextrose 50% Injectable 12.5 Gram(s) IV Push once  dextrose 50% Injectable 25 Gram(s) IV Push once  dextrose 50% Injectable 25 Gram(s) IV Push once  ertapenem  IVPB 1000 milliGRAM(s) IV Intermittent every 24 hours  folic acid 1 milliGRAM(s) Oral daily  insulin glargine Injectable (LANTUS) 15 Unit(s) SubCutaneous at bedtime  insulin lispro (HumaLOG) corrective regimen sliding scale   SubCutaneous three times a day before meals  insulin lispro (HumaLOG) corrective regimen sliding scale   SubCutaneous at bedtime  insulin lispro Injectable (HumaLOG) 3 Unit(s) SubCutaneous three times a day with meals  lactulose Syrup 30 Gram(s) Oral three times a day  midodrine. 30 milliGRAM(s) Oral every 8 hours  pantoprazole    Tablet 40 milliGRAM(s) Oral before breakfast  rifAXIMin 550 milliGRAM(s) Oral two times a day  tamsulosin 0.4 milliGRAM(s) Oral at bedtime    MEDICATIONS  (PRN):  dextrose 40% Gel 15 Gram(s) Oral once PRN Blood Glucose LESS THAN 70 milliGRAM(s)/deciLiter  glucagon  Injectable 1 milliGRAM(s) IntraMuscular once PRN Glucose <70 milliGRAM(s)/deciLiter    Vital Signs Last 24 Hrs  T(C): 36.4 (20 Mar 2020 11:58), Max: 36.9 (19 Mar 2020 16:38)  T(F): 97.6 (20 Mar 2020 11:58), Max: 98.5 (19 Mar 2020 16:38)  HR: 76 (20 Mar 2020 11:58) (71 - 84)  BP: 109/63 (20 Mar 2020 11:58) (96/57 - 124/70)  BP(mean): --  RR: 17 (20 Mar 2020 11:58) (17 - 18)  SpO2: 97% (20 Mar 2020 11:58) (97% - 99%)    I&O's Summary    19 Mar 2020 07:01  -  20 Mar 2020 07:00  --------------------------------------------------------  IN: 1540 mL / OUT: 2970 mL / NET: -1430 mL    20 Mar 2020 07:01  -  20 Mar 2020 13:38  --------------------------------------------------------  IN: 120 mL / OUT: 275 mL / NET: -155 mL        PHYSICAL EXAM:    Constitutional: NAD, awake and alert  Eyes:   Pupils round, no lesions  ENMT: no exudate or erythema  Pulmonary:  breath sounds are clear bilaterally, No wheezing, rales or rhonchi  Cardiovascular: PMI not palpable RRR normal S1 and S2, no murmurs, rubs, gallops or clicks  Gastrointestinal: Bowel Sounds present, soft, nontender.   Lymph: No cervical lymphadenopathy.  Neurological: no focal deficits  Skin: No rashes.  No cyanosis.  Psych:  Mood & affect appropriate   Ext: trc lower ext edema                                7.1    6.00  )-----------( 57       ( 20 Mar 2020 07:18 )             21.7     03-20    132<L>  |  100  |  21  ----------------------------<  126<H>  5.1   |  24  |  1.05    Ca    9.6      20 Mar 2020 07:18  Phos  2.8     03-20  Mg     1.9     03-20    TPro  5.2<L>  /  Alb  2.9<L>  /  TBili  7.8<H>  /  DBili  x   /  AST  39  /  ALT  31  /  AlkPhos  128<H>  03-20    < from: 12 Lead ECG (03.20.20 @ 07:46) >    Ventricular Rate 77 BPM    Atrial Rate 77 BPM    P-R Interval 206 ms    QRS Duration 90 ms    Q-T Interval 404 ms    QTC Calculation(Bezet) 457 ms    P Axis 45 degrees    R Axis 37 degrees    T Axis 51 degrees    Diagnosis Line NORMAL SINUS RHYTHM  LOW VOLTAGE QRS  BORDERLINE ECG    Confirmed by WESTON FAM NAYELI ([id number]) on 3/20/2020 12:37:02 PM    < end of copied text   < from: Xray Chest 1 View- PORTABLE-Routine (03.18.20 @ 07:08) >    EXAM:  XR CHEST PORTABLE ROUTINE 1V                            PROCEDURE DATE:  03/18/2020            INTERPRETATION:  EXAMINATION: XR CHEST    CLINICAL INDICATION:  Status post transurethral drainage. Possible pulmonary vascular congestion..     TECHNIQUE: Frontal radiograph of the chest was obtained on 3/18/2020     COMPARISON: Chest radiograph 3/10/2020.    FINDINGS:     The heart is normal in size. Clear lungs. No pleural effusion or pneumothorax Unremarkable osseous structures.    IMPRESSION:   Clear lungs                TIFFANY VANCE M.D., RADIOLOGY RESIDENT  This document has been electronically signed.  CARMEN ROWELL M.D., ATTENDING RADIOLOGIST  This document has been electronically signed. Mar 18 2020 11:10AM                < end of copied text >  < from: TTE with Doppler (w/Cont) (02.29.20 @ 14:36) >    Patient name: JESSICA MARTIN  YOB: 1955   Age: 64 (M)   MR#: [id number]  Study Date: 2/29/2020  [facility] Louis RDCS  Study quality: Technically difficult  Referring Physician: Cary Rivera MD  Blood Pressure: 122/64 mmHg  Height: 185 cm  Weight: 111 kg  BSA: 2.3 m2  ------------------------------------------------------------------------  PROCEDURE: Transthoracic echocardiogram with 2-D, M-Mode  and complete spectral and color flow Doppler. Verbal  consent was obtained for injection of  Ultrasonic Enhancing  Agent following a discussion of risks and benefits.  Following intravenous injection of Ultrasonic Enhancing  Agent , harmonic imaging was performed.  INDICATION: Pericardial effusion (noninflammatory) (I31.3)  ------------------------------------------------------------------------  Dimensions:    Normal Values:  LA:     4.2    2.0 - 4.0 cm  Ao:     3.7    2.0 - 3.8 cm  SEPTUM: 0.8    0.6 - 1.2 cm  PWT:    0.8    0.6 - 1.1 cm  LVIDd:  5.5    3.0 - 5.6 cm  LVIDs:  2.8    1.8 - 4.0 cm  Derived variables:  LVMI: 68 g/m2  RWT: 0.29  Fractional short: 49 %  EF (Visual Estimate): 70 %  Doppler Peak Velocity (m/sec): AoV=1.9  ------------------------------------------------------------------------  Observations:  Mitral Valve: Normal mitral valve. Mitral annular  calcification.  Aortic Valve/Aorta: Calcified aortic valve with normal  opening.  Normal aortic root size.  Left Atrium: Mildly dilated left atrium.  Left Ventricle: Endocardial visualization enhanced with  intravenous injection of Ultrasonic Enhancing Agent  (Definity).  Normal left ventricular internal dimensions and wall  thicknesses.  Normal left ventricular systolic function. No segmental  wall motion abnormalities.  Normal diastolic function.  Right Heart: Right atrium not well visualized. The right  ventricle is not well visualized. Normal tricuspid valve.  Normal pulmonic valve.  Pericardium/Pleura: Pericardial fat pad noted. No  pericardial effusion.  Hemodynamic: No evidence of pulmonary hypertension.  ------------------------------------------------------------------------  Conclusions:  Endocardial visualization enhanced with intravenous  injection of Ultrasonic Enhancing Agent (Definity).  Normal left ventricular systolic function. No segmental  wall motion abnormalities.  ------------------------------------------------------------------------  Confirmed on  2/29/2020 - 17:07:16 by Earl Plummer MD, FASE  ------------------------------------------------------------------------    < end of copied text >

## 2020-03-20 NOTE — PROGRESS NOTE ADULT - ASSESSMENT
64y male s/p TUR of prostate abscess  - Continue bob catheter, likely will require for 3-4 weeks and cystogram to confirm decreased size in cavity/bladder fistula.  Explained to patient who understands, can be done outpatient.   - Bedside ultrasound done to evaluate placement of bob - bob balloon visualized in the bladder. Gentle Flushing of catheter performed and movement of catheter around in bladder helped to restore flow of bob catheter. Tribal tip does not need to be removed at this time.   - Continue IV ABX, OR Cx growing ESBL E Coli  - Monitor potassium.  - please call if any concerns with bob output quantity, color or any other urologic concerns.   - Trend HCT, stable    d/w Dr. Mayfield

## 2020-03-20 NOTE — PROGRESS NOTE ADULT - ASSESSMENT
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   Recent admission for ESBL E coli Bacteremia and UTI  Patient found to have pyuria on U/A and urine with visible thick sediment  CT A/P (reviewed with radiology) with significant interval increase in prostate size from last month (concerning for abscess)  Could not tolerate MRI  CT A/P (with PO/IV contrast) without concrete abscess but suspicious appearing prostate. Also with fistulous communication with either the posterior wall of the urinary bladder or the urethra.    On 3/15 underwent drainage or prostate abscess   Worsening leukocytosis and septic shock after drainage  Anticipate may have had transient bacteremia with prostate manipulation  Prostate Abscess Cultures with ESBL E coli    Given prostatitis/UTI/abscess patient is not cleared from ID perspective at this time for OLT    Overall, UTI, Prostate Abscess, Leukocytosis, Septic Shock, Positive Culture Finding (UCx), Pre-OLT    --Clinically improved, afebrile, normotensive, no leukocytosis  --Pt received meropenem from 3/11-3/17, drainage performed 3/15  --Will likely need 3-4 wks of antibiotics given likely incompletely drained prostatic abscess  --Will need midline, ok to proceed with midline from ID perspective  --Continue Ertapenem 1g IV Q24H  --Continue to follow CBC with diff  --Continue to follow renal function (Cr/BUN)  --Continue to follow temperature curve  --Follow up on preliminary blood cultures  --Urology Recommendations noted 64 year old man with cirrhosis 2/2 JEAN, CHF, BPH, and DM who presented to the ED after a fall/syncopal event.   Recent admission for ESBL E coli Bacteremia and UTI  Patient found to have pyuria on U/A and urine with visible thick sediment  CT A/P (reviewed with radiology) with significant interval increase in prostate size from last month (concerning for abscess)  Could not tolerate MRI  CT A/P (with PO/IV contrast) without concrete abscess but suspicious appearing prostate. Also with fistulous communication with either the posterior wall of the urinary bladder or the urethra.    On 3/15 underwent drainage or prostate abscess   Worsening leukocytosis and septic shock after drainage  Anticipate may have had transient bacteremia with prostate manipulation  Prostate Abscess Cultures with ESBL E coli    Given prostatitis/UTI/abscess patient is not cleared from ID perspective at this time for OLT    Overall, UTI, Prostate Abscess, Leukocytosis, Septic Shock, Positive Culture Finding (UCx), Pre-OLT    --Clinically improved, afebrile, normotensive, no leukocytosis  --Pt received meropenem from 3/11-3/17, drainage performed 3/15  --Will need 4 wks of antibiotics from drainage given likely incompletely drained prostatic abscess  --Will need midline, ok to proceed with midline from ID perspective  --Continue Ertapenem 1g IV Q24H  --Continue to follow CBC with diff  --Continue to follow renal function (Cr/BUN)  --Continue to follow temperature curve  --Follow up on preliminary blood cultures  --Urology Recommendations noted

## 2020-03-20 NOTE — PROGRESS NOTE ADULT - PROBLEM SELECTOR PLAN 9
- FSG QAC/ HS and ISS  -c/w Lantus 15 units QHS  - Start Humalog 3 units AC meals for now. Will titrate up based on BG values.   -A1C = 6.4 on 2/2020  -Discharge on basal/bolus  -f/u outpt w/Dr Goldberg - FSG QAC/ HS and ISS  - c/w Lantus 15 units QHS  - Start Humalog 3 units AC meals for now. Will titrate up based on BG values.   -A1C = 6.4 on 2/2020  -Discharge on basal/bolus  -f/u outpt w/Dr Goldberg

## 2020-03-20 NOTE — PROGRESS NOTE ADULT - PROBLEM SELECTOR PLAN 2
patient had low cortisol levels  -he has multiple acute medical issues requiring ICU care  -corticosteroid binding globulin pending 3/15.    -I spent a total time of 25 mins with the patient at bedside/on unit of which more than 50% of time was spent on counseling/coordination of care.     Due to [facility] policy during the evolving novel coronavirus outbreak, efforts are being made to limit unnecessary patient contacts to limit the spread of disease.   Accordingly, patients without clear indication for physical exam or face to face interview from the Endocrine team will be adjusted in conjunction with conversations with primary provider team. This is being done for the safety of all the patients we care for.    Call w/any questions  pager: [phone]   Cell: [phone]  office: [phone] patient had low cortisol levels  -he has multiple acute medical issues requiring ICU care  -corticosteroid binding globulin pending.  -Will order a repeat 8AM Cortisol for 3/21

## 2020-03-20 NOTE — PROGRESS NOTE ADULT - SUBJECTIVE AND OBJECTIVE BOX
Follow Up:  ESBL E coli Prostatic Abscess    Inverval History/ROS:Patient is a 64y old  Male who presents with a chief complaint of Syncopal episode at home at 0430 this am, LOC for approx 3 minutes per wife, pt had episode of shaking during that time. Hx of multiple falls. Pt reporting abdominal pain for X 1day, no nausea or vomiting, endorses loose stool on lactulose at home (20 Mar 2020 09:24)      Allergies    codeine (Anaphylaxis)    Intolerances        ANTIMICROBIALS:  ertapenem  IVPB 1000 every 24 hours  rifAXIMin 550 two times a day      OTHER MEDS:  chlorhexidine 2% Cloths 1 Application(s) Topical <User Schedule>  dextrose 40% Gel 15 Gram(s) Oral once PRN  dextrose 5%. 1000 milliLiter(s) IV Continuous <Continuous>  dextrose 50% Injectable 12.5 Gram(s) IV Push once  dextrose 50% Injectable 25 Gram(s) IV Push once  dextrose 50% Injectable 25 Gram(s) IV Push once  folic acid 1 milliGRAM(s) Oral daily  glucagon  Injectable 1 milliGRAM(s) IntraMuscular once PRN  insulin glargine Injectable (LANTUS) 15 Unit(s) SubCutaneous at bedtime  insulin lispro (HumaLOG) corrective regimen sliding scale   SubCutaneous three times a day before meals  insulin lispro (HumaLOG) corrective regimen sliding scale   SubCutaneous at bedtime  insulin lispro Injectable (HumaLOG) 3 Unit(s) SubCutaneous three times a day with meals  lactulose Syrup 30 Gram(s) Oral three times a day  midodrine. 30 milliGRAM(s) Oral every 8 hours  pantoprazole    Tablet 40 milliGRAM(s) Oral before breakfast  tamsulosin 0.4 milliGRAM(s) Oral at bedtime      Vital Signs Last 24 Hrs  T(C): 36.4 (20 Mar 2020 11:58), Max: 36.9 (19 Mar 2020 16:38)  T(F): 97.6 (20 Mar 2020 11:58), Max: 98.5 (19 Mar 2020 16:38)  HR: 76 (20 Mar 2020 11:58) (71 - 84)  BP: 109/63 (20 Mar 2020 11:58) (96/57 - 124/70)  BP(mean): --  RR: 17 (20 Mar 2020 11:58) (17 - 18)  SpO2: 97% (20 Mar 2020 11:58) (97% - 99%)    Physical Exam  Gen: Well appearing, NAD  Lungs: CTA B/L, no w/r/r  Heart: RRR, no m/r/g  Abdomen: soft, non-ttp  Ext: no pitting edema  Skin: no rashes                          7.1    6.00  )-----------( 57       ( 20 Mar 2020 07:18 )             21.7       03-20    132<L>  |  100  |  21  ----------------------------<  126<H>  5.1   |  24  |  1.05    Ca    9.6      20 Mar 2020 07:18  Phos  2.8     03-20  Mg     1.9     03-20    TPro  5.2<L>  /  Alb  2.9<L>  /  TBili  7.8<H>  /  DBili  x   /  AST  39  /  ALT  31  /  AlkPhos  128<H>  03-20          MICROBIOLOGY:Culture Results:   Insufficient growth-culture reincubated (03-16 @ 04:48)  Culture Results:   No growth to date. (03-16 @ 01:53)  Culture Results:   No growth to date. (03-16 @ 01:53)  Culture Results:   Moderate Escherichia coli ESBL (03-15 @ 17:24)      RADIOLOGY: Follow Up:  ESBL E coli Prostatic Abscess    Inverval History/ROS:Patient is a 64y old  Male who presents with a chief complaint of Syncopal episode at home at 0430 this am, LOC for approx 3 minutes per wife, pt had episode of shaking during that time. Hx of multiple falls. Pt reporting abdominal pain for X 1day, no nausea or vomiting, endorses loose stool on lactulose at home (20 Mar 2020 09:24)    No acute interval events, pt is feeling well, denies fever, chills, cough, diarrhea, pelvic pain. Wants to go home, concerned about staying in the hospital given all the COVID-related-changes    Allergies    codeine (Anaphylaxis)    Intolerances        ANTIMICROBIALS:  ertapenem  IVPB 1000 every 24 hours  rifAXIMin 550 two times a day      OTHER MEDS:  chlorhexidine 2% Cloths 1 Application(s) Topical <User Schedule>  dextrose 40% Gel 15 Gram(s) Oral once PRN  dextrose 5%. 1000 milliLiter(s) IV Continuous <Continuous>  dextrose 50% Injectable 12.5 Gram(s) IV Push once  dextrose 50% Injectable 25 Gram(s) IV Push once  dextrose 50% Injectable 25 Gram(s) IV Push once  folic acid 1 milliGRAM(s) Oral daily  glucagon  Injectable 1 milliGRAM(s) IntraMuscular once PRN  insulin glargine Injectable (LANTUS) 15 Unit(s) SubCutaneous at bedtime  insulin lispro (HumaLOG) corrective regimen sliding scale   SubCutaneous three times a day before meals  insulin lispro (HumaLOG) corrective regimen sliding scale   SubCutaneous at bedtime  insulin lispro Injectable (HumaLOG) 3 Unit(s) SubCutaneous three times a day with meals  lactulose Syrup 30 Gram(s) Oral three times a day  midodrine. 30 milliGRAM(s) Oral every 8 hours  pantoprazole    Tablet 40 milliGRAM(s) Oral before breakfast  tamsulosin 0.4 milliGRAM(s) Oral at bedtime      Vital Signs Last 24 Hrs  T(C): 36.4 (20 Mar 2020 11:58), Max: 36.9 (19 Mar 2020 16:38)  T(F): 97.6 (20 Mar 2020 11:58), Max: 98.5 (19 Mar 2020 16:38)  HR: 76 (20 Mar 2020 11:58) (71 - 84)  BP: 109/63 (20 Mar 2020 11:58) (96/57 - 124/70)  BP(mean): --  RR: 17 (20 Mar 2020 11:58) (17 - 18)  SpO2: 97% (20 Mar 2020 11:58) (97% - 99%)    Physical Exam  Gen: Well appearing, NAD  Lungs: CTA B/L, no w/r/r  Heart: RRR, no m/r/g  Abdomen: soft, non-ttp  Ext: no pitting edema  Skin: no rashes                          7.1    6.00  )-----------( 57       ( 20 Mar 2020 07:18 )             21.7       03-20    132<L>  |  100  |  21  ----------------------------<  126<H>  5.1   |  24  |  1.05    Ca    9.6      20 Mar 2020 07:18  Phos  2.8     03-20  Mg     1.9     03-20    TPro  5.2<L>  /  Alb  2.9<L>  /  TBili  7.8<H>  /  DBili  x   /  AST  39  /  ALT  31  /  AlkPhos  128<H>  03-20          MICROBIOLOGY:Culture Results:   Insufficient growth-culture reincubated (03-16 @ 04:48)  Culture Results:   No growth to date. (03-16 @ 01:53)  Culture Results:   No growth to date. (03-16 @ 01:53)  Culture Results:   Moderate Escherichia coli ESBL (03-15 @ 17:24)      RADIOLOGY:  < from: Xray Chest 1 View- PORTABLE-Routine (03.18.20 @ 07:08) >    IMPRESSION:   Clear lungs      < from: CT Pelvis No Cont (03.13.20 @ 00:09) >  IMPRESSION:     Prostate gland is again noted to be enlarged with diffusely low attenuation appearance, concerning for underlying abscess. Following instillation of contrast into the urinary bladder, there is evidence of extraluminal contrast tracking along the anterior aspect of the prostate gland. This may be secondary to fistulous communication with either the posterior wall of the urinary bladder or the urethra.    Diffusely thickened wall of the urinary bladder with mild infiltration of the perivesicle fat.    Cirrhotic configuration of the liver with evidence of portal hypertension and multiple varices throughout the abdomen.    Nonobstructing 0.7 cm left intrarenal calculus.          < end of copied text >

## 2020-03-20 NOTE — PROGRESS NOTE ADULT - PROBLEM SELECTOR PLAN 4
(2) assistive person General with urinary symptoms with leukocytosis and abnormal urinalysis   Prolonged QTC.   - Cardiology following, recs appreciated   - Daily EKG for prolonged QTC  - Keep K>4.0 and Mg>2.0  - Avoid QTc prolonging meds   - C/w midodrine   - echo in 2/2020 with normal LV function. No need to repeat.  - No signs of significant ischemia. Cath with non obs disease

## 2020-03-20 NOTE — PROGRESS NOTE ADULT - ASSESSMENT
64M with DM2, dyslipidemia, obesity, BPH, Orthostatic hypotension, GERD, HTN, Neuropathy, Obesity, HFpEF with mild LV diastolic dysfunction, and decompensated JEAN cirrhosis complicated by ascites, history of SBP, hepatic encephalopathy, and chronic anemia with a history of duodenal ulcer as well as GAVE and duodenal AVM s/p APC (last on 10/11/19), who is UNOS listed for liver transplantation at [facility] who presents with fall at home, general weakness and ? syncope 2/2 sepsis from UTI w/ prostatic abscess.

## 2020-03-20 NOTE — PROGRESS NOTE ADULT - ASSESSMENT
65 y/o M w/ uncontrolled Type 2 DM w/ hyperglycemia complicated by neuropathy and retinopathy, JEAN cirrhosis admitted with fall at home, general weakness/syncope s/p cystoscopy and transurethral drainage of prostatic abscess (3/15/20) c/b postop hypotension, altered mental status and bright red blood in bob bag. Found to have a low am cortisol on workup for orthostasis. Tolerating POs. BG values increased through the day yesterday. Will add low dose premeal to improve glycemic control. BG goal (100-180mg/dl). 63 y/o M w/ uncontrolled Type 2 DM w/ hyperglycemia complicated by neuropathy and retinopathy, JEAN cirrhosis admitted with fall at home, general weakness/syncope s/p cystoscopy and transurethral drainage of prostatic abscess (3/15/20) c/b postop hypotension, altered mental status and bright red blood in bob bag. Found to have a low am cortisol on workup for orthostasis. Tolerating POs.  BG goal (100-180mg/dl).

## 2020-03-20 NOTE — PROGRESS NOTE ADULT - PROBLEM SELECTOR PLAN 1
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 Coli.    - C/w ertapenem. Per ID will need 3-4 course. Will need midline.   - s/p TURP and drainage of abscess  - ID following, recs appreciated  - Urology following, recs appreciated. Pt's Bob flushed today. Do not remove bob catheter for 4+ weeks as pt heals. 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 Coli.    - C/w ertapenem. Per ID will need 3-4 wk course. Will need midline.   - s/p TURP and drainage of abscess  - ID following, recs appreciated  - Urology following, recs appreciated. Pt's Bob flushed. Do not remove bob catheter for 4+ weeks as pt heals.

## 2020-03-21 ENCOUNTER — TRANSCRIPTION ENCOUNTER (OUTPATIENT)
Age: 65
End: 2020-03-21

## 2020-03-21 LAB
ALBUMIN SERPL ELPH-MCNC: 2.9 G/DL — LOW (ref 3.3–5)
ALP SERPL-CCNC: 148 U/L — HIGH (ref 40–120)
ALT FLD-CCNC: 30 U/L — SIGNIFICANT CHANGE UP (ref 10–45)
ANION GAP SERPL CALC-SCNC: 9 MMOL/L — SIGNIFICANT CHANGE UP (ref 5–17)
AST SERPL-CCNC: 39 U/L — SIGNIFICANT CHANGE UP (ref 10–40)
BASOPHILS # BLD AUTO: 0.06 K/UL — SIGNIFICANT CHANGE UP (ref 0–0.2)
BASOPHILS NFR BLD AUTO: 1.1 % — SIGNIFICANT CHANGE UP (ref 0–2)
BILIRUB SERPL-MCNC: 7.4 MG/DL — HIGH (ref 0.2–1.2)
BUN SERPL-MCNC: 23 MG/DL — SIGNIFICANT CHANGE UP (ref 7–23)
CALCIUM SERPL-MCNC: 9.7 MG/DL — SIGNIFICANT CHANGE UP (ref 8.4–10.5)
CHLORIDE SERPL-SCNC: 103 MMOL/L — SIGNIFICANT CHANGE UP (ref 96–108)
CO2 SERPL-SCNC: 23 MMOL/L — SIGNIFICANT CHANGE UP (ref 22–31)
CORTIS AM PEAK SERPL-MCNC: 2.5 UG/DL — LOW (ref 6–18.4)
CORTIS AM PEAK SERPL-MCNC: 4.3 UG/DL — LOW (ref 6–18.4)
CREAT SERPL-MCNC: 1.05 MG/DL — SIGNIFICANT CHANGE UP (ref 0.5–1.3)
CULTURE RESULTS: SIGNIFICANT CHANGE UP
CULTURE RESULTS: SIGNIFICANT CHANGE UP
EOSINOPHIL # BLD AUTO: 0.24 K/UL — SIGNIFICANT CHANGE UP (ref 0–0.5)
EOSINOPHIL NFR BLD AUTO: 4.3 % — SIGNIFICANT CHANGE UP (ref 0–6)
GLUCOSE BLDC GLUCOMTR-MCNC: 127 MG/DL — HIGH (ref 70–99)
GLUCOSE BLDC GLUCOMTR-MCNC: 181 MG/DL — HIGH (ref 70–99)
GLUCOSE BLDC GLUCOMTR-MCNC: 200 MG/DL — HIGH (ref 70–99)
GLUCOSE BLDC GLUCOMTR-MCNC: 97 MG/DL — SIGNIFICANT CHANGE UP (ref 70–99)
GLUCOSE SERPL-MCNC: 98 MG/DL — SIGNIFICANT CHANGE UP (ref 70–99)
HCT VFR BLD CALC: 22.5 % — LOW (ref 39–50)
HCT VFR BLD CALC: 23.2 % — LOW (ref 39–50)
HGB BLD-MCNC: 7.3 G/DL — LOW (ref 13–17)
HGB BLD-MCNC: 7.3 G/DL — LOW (ref 13–17)
IMM GRANULOCYTES NFR BLD AUTO: 1.6 % — HIGH (ref 0–1.5)
INR BLD: 2.09 RATIO — HIGH (ref 0.88–1.16)
LYMPHOCYTES # BLD AUTO: 1.62 K/UL — SIGNIFICANT CHANGE UP (ref 1–3.3)
LYMPHOCYTES # BLD AUTO: 29.1 % — SIGNIFICANT CHANGE UP (ref 13–44)
MAGNESIUM SERPL-MCNC: 1.9 MG/DL — SIGNIFICANT CHANGE UP (ref 1.6–2.6)
MCHC RBC-ENTMCNC: 31.5 GM/DL — LOW (ref 32–36)
MCHC RBC-ENTMCNC: 31.9 PG — SIGNIFICANT CHANGE UP (ref 27–34)
MCHC RBC-ENTMCNC: 32.4 GM/DL — SIGNIFICANT CHANGE UP (ref 32–36)
MCHC RBC-ENTMCNC: 32.7 PG — SIGNIFICANT CHANGE UP (ref 27–34)
MCV RBC AUTO: 100.9 FL — HIGH (ref 80–100)
MCV RBC AUTO: 101.3 FL — HIGH (ref 80–100)
MONOCYTES # BLD AUTO: 0.48 K/UL — SIGNIFICANT CHANGE UP (ref 0–0.9)
MONOCYTES NFR BLD AUTO: 8.6 % — SIGNIFICANT CHANGE UP (ref 2–14)
NEUTROPHILS # BLD AUTO: 3.07 K/UL — SIGNIFICANT CHANGE UP (ref 1.8–7.4)
NEUTROPHILS NFR BLD AUTO: 55.3 % — SIGNIFICANT CHANGE UP (ref 43–77)
NRBC # BLD: 0 /100 WBCS — SIGNIFICANT CHANGE UP (ref 0–0)
NRBC # BLD: 0 /100 WBCS — SIGNIFICANT CHANGE UP (ref 0–0)
PHOSPHATE SERPL-MCNC: 2.7 MG/DL — SIGNIFICANT CHANGE UP (ref 2.5–4.5)
PLATELET # BLD AUTO: 56 K/UL — LOW (ref 150–400)
PLATELET # BLD AUTO: 56 K/UL — LOW (ref 150–400)
POTASSIUM SERPL-MCNC: 4.7 MMOL/L — SIGNIFICANT CHANGE UP (ref 3.5–5.3)
POTASSIUM SERPL-SCNC: 4.7 MMOL/L — SIGNIFICANT CHANGE UP (ref 3.5–5.3)
PROT SERPL-MCNC: 5.6 G/DL — LOW (ref 6–8.3)
PROTHROM AB SERPL-ACNC: 24.4 SEC — HIGH (ref 10–13.1)
RBC # BLD: 2.23 M/UL — LOW (ref 4.2–5.8)
RBC # BLD: 2.29 M/UL — LOW (ref 4.2–5.8)
RBC # FLD: 21 % — HIGH (ref 10.3–14.5)
RBC # FLD: 21 % — HIGH (ref 10.3–14.5)
SODIUM SERPL-SCNC: 135 MMOL/L — SIGNIFICANT CHANGE UP (ref 135–145)
SPECIMEN SOURCE: SIGNIFICANT CHANGE UP
SPECIMEN SOURCE: SIGNIFICANT CHANGE UP
WBC # BLD: 5.56 K/UL — SIGNIFICANT CHANGE UP (ref 3.8–10.5)
WBC # BLD: 6.08 K/UL — SIGNIFICANT CHANGE UP (ref 3.8–10.5)
WBC # FLD AUTO: 5.56 K/UL — SIGNIFICANT CHANGE UP (ref 3.8–10.5)
WBC # FLD AUTO: 6.08 K/UL — SIGNIFICANT CHANGE UP (ref 3.8–10.5)

## 2020-03-21 PROCEDURE — 93010 ELECTROCARDIOGRAM REPORT: CPT

## 2020-03-21 PROCEDURE — 99232 SBSQ HOSP IP/OBS MODERATE 35: CPT

## 2020-03-21 PROCEDURE — 99232 SBSQ HOSP IP/OBS MODERATE 35: CPT | Mod: GC

## 2020-03-21 PROCEDURE — 99233 SBSQ HOSP IP/OBS HIGH 50: CPT | Mod: GC

## 2020-03-21 RX ORDER — MAGNESIUM SULFATE 500 MG/ML
1 VIAL (ML) INJECTION ONCE
Refills: 0 | Status: COMPLETED | OUTPATIENT
Start: 2020-03-21 | End: 2020-03-21

## 2020-03-21 RX ORDER — COSYNTROPIN 0.25 MG/ML
0.25 INJECTION, SOLUTION INTRAVENOUS ONCE
Refills: 0 | Status: COMPLETED | OUTPATIENT
Start: 2020-03-22 | End: 2020-03-22

## 2020-03-21 RX ORDER — INSULIN GLARGINE 100 [IU]/ML
13 INJECTION, SOLUTION SUBCUTANEOUS AT BEDTIME
Refills: 0 | Status: DISCONTINUED | OUTPATIENT
Start: 2020-03-21 | End: 2020-03-22

## 2020-03-21 RX ADMIN — MIDODRINE HYDROCHLORIDE 30 MILLIGRAM(S): 2.5 TABLET ORAL at 05:52

## 2020-03-21 RX ADMIN — TAMSULOSIN HYDROCHLORIDE 0.4 MILLIGRAM(S): 0.4 CAPSULE ORAL at 21:51

## 2020-03-21 RX ADMIN — MIDODRINE HYDROCHLORIDE 30 MILLIGRAM(S): 2.5 TABLET ORAL at 16:56

## 2020-03-21 RX ADMIN — INSULIN GLARGINE 13 UNIT(S): 100 INJECTION, SOLUTION SUBCUTANEOUS at 21:51

## 2020-03-21 RX ADMIN — Medication 1 MILLIGRAM(S): at 15:43

## 2020-03-21 RX ADMIN — CHLORHEXIDINE GLUCONATE 1 APPLICATION(S): 213 SOLUTION TOPICAL at 05:53

## 2020-03-21 RX ADMIN — PANTOPRAZOLE SODIUM 40 MILLIGRAM(S): 20 TABLET, DELAYED RELEASE ORAL at 05:52

## 2020-03-21 RX ADMIN — MIDODRINE HYDROCHLORIDE 30 MILLIGRAM(S): 2.5 TABLET ORAL at 21:50

## 2020-03-21 RX ADMIN — ERTAPENEM SODIUM 120 MILLIGRAM(S): 1 INJECTION, POWDER, LYOPHILIZED, FOR SOLUTION INTRAMUSCULAR; INTRAVENOUS at 15:43

## 2020-03-21 RX ADMIN — Medication 100 GRAM(S): at 11:28

## 2020-03-21 RX ADMIN — Medication 1: at 17:41

## 2020-03-21 NOTE — PROGRESS NOTE ADULT - SUBJECTIVE AND OBJECTIVE BOX
· Subjective and Objective:   [facility]    Sushila Dhaliwal, Doug, Morenita, Clay Rollins, Philomena Macias      [phone]    CHIEF COMPLAINT: Patient is a 64y old  Male who presents with a chief complaint of Syncopal episode at home at 0430 this am, LOC for approx 3 minutes per wife, pt had episode of shaking during that time. Hx of multiple falls. Pt reporting abdominal pain for X 1day, no nausea or vomiting, endorses loose stool on lactulose at home (19 Mar 2020 07:43)      Follow Up: hypotension, hemorrhagic shock    Interim history: No overnight events, discussed with RN.  Pt sleeping comfortably in bed, hematuria ongoing    ROS otherwise negative unless noted    PAST MEDICAL & SURGICAL HISTORY:  GIB (gastrointestinal bleeding)  GERD with esophagitis: Gastritis &amp; Non Bleeding Ulcers  Hepatic encephalopathy  Obesity  Fatty liver disease, nonalcoholic  Renal stones: 25 years ago  Hypertension  Neuropathy  Hypercholesteremia  Diabetes  S/P cholecystectomy      MEDICATIONS  (STANDING):  chlorhexidine 2% Cloths 1 Application(s) Topical <User Schedule>  dextrose 5%. 1000 milliLiter(s) (50 mL/Hr) IV Continuous <Continuous>  dextrose 50% Injectable 12.5 Gram(s) IV Push once  dextrose 50% Injectable 25 Gram(s) IV Push once  dextrose 50% Injectable 25 Gram(s) IV Push once  ertapenem  IVPB 1000 milliGRAM(s) IV Intermittent every 24 hours  folic acid 1 milliGRAM(s) Oral daily  insulin glargine Injectable (LANTUS) 15 Unit(s) SubCutaneous at bedtime  insulin lispro (HumaLOG) corrective regimen sliding scale   SubCutaneous three times a day before meals  insulin lispro (HumaLOG) corrective regimen sliding scale   SubCutaneous at bedtime  lactulose Syrup 30 Gram(s) Oral three times a day  midodrine. 30 milliGRAM(s) Oral every 8 hours  pantoprazole    Tablet 40 milliGRAM(s) Oral before breakfast  rifAXIMin 550 milliGRAM(s) Oral two times a day  tamsulosin 0.4 milliGRAM(s) Oral at bedtime      Allergies    codeine (Anaphylaxis)    Intolerances                              7.3    6.08  )-----------( 56       ( 21 Mar 2020 08:48 )             22.5       03-21    135  |  103  |  23  ----------------------------<  98  4.7   |  23  |  1.05    Ca    9.7      21 Mar 2020 06:42  Phos  2.7     03-21  Mg     1.9     03-21    TPro  5.6<L>  /  Alb  2.9<L>  /  TBili  7.4<H>  /  DBili  x   /  AST  39  /  ALT  30  /  AlkPhos  148<H>  03-21      LIVER FUNCTIONS - ( 21 Mar 2020 06:42 )  Alb: 2.9 g/dL / Pro: 5.6 g/dL / ALK PHOS: 148 U/L / ALT: 30 U/L / AST: 39 U/L / GGT: x             PT/INR - ( 20 Mar 2020 07:18 )   PT: 25.4 sec;   INR: 2.18 ratio         PTT - ( 20 Mar 2020 07:18 )  PTT:51.1 sec                      Daily     Daily     I&O's Summary    20 Mar 2020 07:01  -  21 Mar 2020 07:00  --------------------------------------------------------  IN: 120 mL / OUT: 2525 mL / NET: -2405 mL        Vital Signs Last 24 Hrs  T(C): 36.4 (21 Mar 2020 07:49), Max: 36.7 (21 Mar 2020 00:47)  T(F): 97.5 (21 Mar 2020 07:49), Max: 98.1 (21 Mar 2020 00:47)  HR: 75 (21 Mar 2020 07:49) (73 - 82)  BP: 121/67 (21 Mar 2020 07:49) (109/63 - 121/67)  BP(mean): --  RR: 16 (21 Mar 2020 07:49) (16 - 17)  SpO2: 100% (21 Mar 2020 07:49) (97% - 100%)    PHYSICAL EXAM:   · Constitutional	Well-developed, well nourished  · Eyes	EOMI; PERRL; no drainage or redness  · ENMT	No oral lesions; no gross abnormalities  · Neck	No bruits; no thyromegaly or nodules  · Respiratory	Normal breath sounds b/l, No RRW  · Cardiovascular	Regular rate & rhythm, normal S1, S2; no murmurs, gallops or rubs; no S3, S4  · Gastrointestinal	Soft, non-tender, no hepatosplenomegaly, normal bowel sounds  · Extremities	No cyanosis, clubbing or edema  · Vascular	Equal and normal pulses (carotid, femoral, dorsalis pedis)  · Neurological	Alert & oriented; no sensory, motor or coordination deficits, normal reflexes

## 2020-03-21 NOTE — DISCHARGE NOTE PROVIDER - NSDCFUADDAPPT_GEN_ALL_CORE_FT
Please arrange for follow-up with your primary care physician Dr. Dillan Coto within 1 week. Their number is [phone]. Please arrange for follow-up with your primary care physician Dr. Dillan Coto within 1 week. The clinic number is [phone].    Please follow-up with the infectious disease specialist Dr. Pérez and with the hepatologist Dr. Medina. Please also follow-up with the urologist.

## 2020-03-21 NOTE — PROGRESS NOTE ADULT - SUBJECTIVE AND OBJECTIVE BOX
GASTROENTEROLOGY    Patient seen and examined at bedside.   events noted    MEDICATIONS  (STANDING):  albumin human 25% IVPB 100 milliLiter(s) IV Intermittent every 8 hours  chlorhexidine 2% Cloths 1 Application(s) Topical daily  dextrose 5%. 1000 milliLiter(s) (50 mL/Hr) IV Continuous <Continuous>  dextrose 50% Injectable 12.5 Gram(s) IV Push once  dextrose 50% Injectable 25 Gram(s) IV Push once  folic acid 1 milliGRAM(s) Oral daily  insulin glargine Injectable (LANTUS) 15 Unit(s) SubCutaneous at bedtime  insulin lispro (HumaLOG) corrective regimen sliding scale   SubCutaneous at bedtime  insulin lispro (HumaLOG) corrective regimen sliding scale   SubCutaneous three times a day before meals  insulin lispro Injectable (HumaLOG) 12 Unit(s) SubCutaneous three times a day before meals  lactated ringers. 1000 milliLiter(s) (100 mL/Hr) IV Continuous <Continuous>  lactulose Syrup 20 Gram(s) Oral three times a day  meropenem  IVPB 1000 milliGRAM(s) IV Intermittent every 8 hours  midodrine. 30 milliGRAM(s) Oral three times a day  pantoprazole    Tablet 40 milliGRAM(s) Oral before breakfast  rifAXIMin 550 milliGRAM(s) Oral two times a day  tamsulosin 0.4 milliGRAM(s) Oral at bedtime        T(F): 98.2 (03-15-20 @ 04:39), Max: 98.2 (03-14-20 @ 21:02)  HR: 76 (03-15-20 @ 04:39) (76 - 83)  BP: 101/60 (03-15-20 @ 04:39) (101/60 - 114/64)  RR: 18 (03-15-20 @ 04:39) (18 - 18)  SpO2: 97% (03-15-20 @ 04:39) (96% - 98%)  Wt(kg): --    PHYSICAL EXAM  GENERAL:   NAD  HEENT:  NC/AT, no JVD, sclera-anicteric  CHEST:  clear to ascultation bilaterally, respirations nonlabored  HEART:  +S1+S2   ABDOMEN:  Soft, non-tender, non-distended, + bowel sounds  EXTREMITIES:  no cyanosis, clubbing or edema  NEURO:  Alert, oriented  SKIN:  No rash/warm/dry      LABS:    03-14    130<L>  |  96  |  20  ----------------------------<  264<H>  4.5   |  26  |  0.93    Ca    9.7      14 Mar 2020 06:49    TPro  5.9<L>  /  Alb  3.3  /  TBili  7.4<H>  /  DBili  x   /  AST  18  /  ALT  13  /  AlkPhos  183<H>  03-14    LIVER FUNCTIONS - ( 14 Mar 2020 06:49 )  Alb: 3.3 g/dL / Pro: 5.9 g/dL / ALK PHOS: 183 U/L / ALT: 13 U/L / AST: 18 U/L / GGT: x           PT/INR - ( 14 Mar 2020 09:07 )   PT: 25.2 sec;   INR: 2.16 ratio         PTT - ( 14 Mar 2020 09:07 )  PTT:51.2 sec  I&O's Detail    14 Mar 2020 07:01  -  15 Mar 2020 07:00  --------------------------------------------------------  IN:    lactated ringers.: 100 mL    Oral Fluid: 1165 mL  Total IN: 1265 mL    OUT:    Indwelling Catheter - Urethral: 800 mL    Voided: 700 mL  Total OUT: 1500 mL    Total NET: -235 mL

## 2020-03-21 NOTE — PROGRESS NOTE ADULT - ASSESSMENT
decompensated cirrhosis  frequent falls  UTI  anemia    s/p prostatic abscess  now with bob  will need long term IV antibiotics  transplant hepatology input much appreciated  h/h trend noted; no overt s/s of GIB   monitor cbc

## 2020-03-21 NOTE — PROGRESS NOTE ADULT - ASSESSMENT
64M with DM2,  dyslipidemia, obesity, BPH, Orthostatic hypotension, GERD, HTN, Neuropathy, Obesity, HFpEF with mild LV diastolic dysfunction, and decompensated JEAN cirrhosis complicated by ascites, history of SBP, hepatic encephalopathy, and chronic anemia with a history of duodenal ulcer as well as GAVE and duodenal AVM s/p APC (last on 10/11/19), who is UNOS listed for liver transplantation at [facility] who presents with fall at home, general weakness and ? syncope in setting of UTI/abscess.  He is s/p TURP, and now gross hematuria.  Had AMS and hypotension, likely a component of both septic and hypovolemic shock.  s/p mm blood products, and not on IV pressor support    - Continue to trend H/H and transfuse as needed  - pressors off, but is very high dose midodrine    - Hold diuretics as not vol ol  - Avoid QTC prolonging drugs  - Monitor and replete electrolytes. Keep K>4.0 and Mg>2.0. Replete Mg today  - cont abx  - No signs of significant ischemia. Cath with non obs disease  - echo in 2/2020 with normal LV function. No need to repeat.    - Further cardiac workup will depend on clinical course.   - All other workup per primary team. Will followup.

## 2020-03-21 NOTE — PROGRESS NOTE ADULT - ASSESSMENT
64M with DM2, dyslipidemia, obesity, BPH, Orthostatic hypotension, GERD, HTN, Neuropathy, Obesity, HFpEF with mild LV diastolic dysfunction, and decompensated JEAN cirrhosis complicated by ascites, history of SBP, hepatic encephalopathy, and chronic anemia with a history of duodenal ulcer as well as GAVE and duodenal AVM s/p APC (last on 10/11/19), who is UNOS listed for liver transplantation at [facility] who presents with fall at home, general weakness and ? syncope 2/2 sepsis from UTI w/ prostatic abscess.

## 2020-03-21 NOTE — DISCHARGE NOTE PROVIDER - PROVIDER TOKENS
PROVIDER:[TOKEN:[[id number]],FOLLOWUP:[1 week]],PROVIDER:[TOKEN:[[id number]],FOLLOWUP:[1 week]]

## 2020-03-21 NOTE — DISCHARGE NOTE PROVIDER - CARE PROVIDERS DIRECT ADDRESSES
[email or web address]

## 2020-03-21 NOTE — CHART NOTE - NSCHARTNOTEFT_GEN_A_CORE
Glucose values at goal 90s to 170s over past 24 hours. AM cortisol repeated and remains low 4.3mg/dl.     T2DM/Plan:  -test BG AC/HS  -Decrease Lantus 13 units QHS  -c/w Humalog low correction scale AC and Low HS scale  -hold off on standing premeal at this time.   -Discharge on basal/bolus  -f/u outpt w/Dr Goldberg.    Adrenal Insufficiency:  -repeat AM cortisol remains low.   -corticosteroid binding globulin pending from 3/15  -discussed w/endocrine attending, recommend cortisol stim test.   -Check baseline AM cortisol/ACTH level, administer .25mg of IV cosyntropin and Cortisol level 45 minutes later.       Due to [facility] policy during the evolving novel coronavirus outbreak, efforts are being made to limit unnecessary patient contacts to limit the spread of disease.   Accordingly, patients without clear indication for physical exam or face to face interview from the Endocrine team will be adjusted in conjunction with conversations with primary provider team. This is being done for the safety of all the patients we care for.     discussed w/primary team    please call w/any questions.     pager: [phone]  Cell: [phone]  office: [phone]

## 2020-03-21 NOTE — PROGRESS NOTE ADULT - PROBLEM SELECTOR PLAN 4
General with urinary symptoms with leukocytosis and abnormal urinalysis   Prolonged QTC.   - Cardiology following, recs appreciated   - Daily EKG for prolonged QTC  - Keep K>4.0 and Mg>2.0  - Avoid QTc prolonging meds   - C/w midodrine   - echo in 2/2020 with normal LV function. No need to repeat.  - No signs of significant ischemia. Cath with non obs disease

## 2020-03-21 NOTE — DISCHARGE NOTE PROVIDER - NSDCMRMEDTOKEN_GEN_ALL_CORE_FT
folic acid 1 mg oral tablet: 1 tab(s) orally once a day  furosemide 20 mg oral tablet: 1 tab(s) orally once a day  insulin glargine: 12 unit(s) subcutaneous once a day (at bedtime)  insulin lispro: 6 unit(s) subcutaneous 3 times a day (before meals)  lactulose 10 g/15 mL oral syrup: 15 milliliter(s) orally 3 times a day  midodrine 10 mg oral tablet: 3 tab(s) orally 3 times a day  pantoprazole 40 mg oral delayed release tablet: 1 tab(s) orally once a day (before a meal)  rifAXIMin 550 mg oral tablet: 1 tab(s) orally 2 times a day  spironolactone 25 mg oral tablet: 2 tab(s) orally once a day  tamsulosin 0.4 mg oral capsule: 1 cap(s) orally once a day (at bedtime) ertapenem 1 g injection: 1 gram(s) intravenously every 24 hours through 4/11/20 to complete 4-week course  folic acid 1 mg oral tablet: 1 tab(s) orally once a day  insulin glargine: 12 unit(s) subcutaneous once a day (at bedtime)  insulin lispro: 3 unit(s) subcutaneous 3 times a day (before meals)  lactulose 10 g/15 mL oral syrup: 45 milliliter(s) orally 3 times a day  midodrine 10 mg oral tablet: 3 tab(s) orally 3 times a day  pantoprazole 40 mg oral delayed release tablet: 1 tab(s) orally once a day (before a meal)  rifAXIMin 550 mg oral tablet: 1 tab(s) orally 2 times a day  tamsulosin 0.4 mg oral capsule: 1 cap(s) orally once a day (at bedtime)

## 2020-03-21 NOTE — DISCHARGE NOTE PROVIDER - NSFOLLOWUPCLINICS_GEN_ALL_ED_FT
[facility] - Urology  Urology  [address], [address] [place] [address]  Phone: [phone]  Fax:   Follow Up Time: 1 week

## 2020-03-21 NOTE — DISCHARGE NOTE PROVIDER - NSDCHHNEEDSERVICE_GEN_ALL_CORE
Medication teaching and assessment Teaching and training/Observation and assessment/Medication teaching and assessment

## 2020-03-21 NOTE — DISCHARGE NOTE PROVIDER - HOSPITAL COURSE
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 Collateral information obtained from chart review as patient is not communicative during evaluation. During admission patient had CT scan which showed diffuse low density appearance of the prostate concerning for abscess while patient reporting difficulty with urination but denying dysuria, urgency, frequency, gross hematuria, or rectal pain/pressure. Patient was take to OR with urology on 3/15/20 and underwent cystoscopy and transurethral drainage of prostatic abscess. Postop patient developed hypotension, altered mental status and bright red blood in bob bag. SICU consulted for evaluation. Vasopressor support was initiated with Levo and Vaso and MTP was activated, 4 units of FFP and 4units of PRBC were transfused, also received 1unit platelets and vitamin K. Pt subsequently stabilized and transferred to medicine service. Pt was noted to have low AM cortisol. Endocrinology was consulted, results of co-syntropin stim test were _________. CBG was ______. ID followed pt and pt will need midline and 4 weeks of ertapenem (from the time of prostate drainage). Pt will also need CBC w/ diff and CMP weekly while on ertapenem. Results should be faxed to [phone]. Pt will need ID follow up as an outpt in 3-4 weeks. Pt also had a bob placed which per urology should remain in place for 3-4 weeks. Pt will need outpt followup with urology. Pt will also need hepatology follow up as out pt and SBP ppx once current abx regimen is completed. Patient is a 64M with pmh DM2, HLD, obesity, BPH, Orthostatic hypotension, GERD, HTN, Neuropathy, Obesity, HFpEF with mild LV diastolic dysfunction, and decompensated JEAN cirrhosis complicated by ascites, history of SBP, hepatic encephalopathy, and chronic anemia with a history of duodenal ulcer and duodenal AVM who is UNOS listed for liver transplantation at [facility] admitted with fall at home, general weakness/syncope. Collateral information obtained from chart review as patient is not communicative during evaluation. During admission patient had CT scan which showed diffuse low density appearance of the prostate concerning for abscess while patient reporting difficulty with urination but denying dysuria, urgency, frequency, gross hematuria, or rectal pain/pressure. Patient was take to OR with urology on 3/15/20 and underwent cystoscopy and transurethral drainage of prostatic abscess. Postop patient developed hypotension, altered mental status and bright red blood in bob bag. SICU consulted for evaluation. Vasopressor support was initiated with Levo and Vaso and MTP was activated, 4 units of FFP and 4units of PRBC were transfused, also received 1unit platelets and vitamin K. Pt subsequently stabilized and transferred to medicine service. Pt was noted to have low AM cortisol. Endocrinology was consulted, results of co-syntropin stim test were inconstient with adrenal insufficiency. CBG still pending. ID followed pt and pt had midline placed for 4 weeks of ertapenem (from the time of prostate drainage 3/25-4/11). Pt will also need CBC w/ diff and CMP weekly while on ertapenem. Results should be faxed to [phone]. Pt will need ID follow up as an outpt in 3-4 weeks. Pt also had a obb placed which per urology should remain in place for 4 weeks while healing. Pt will need outpt followup with urology. Pt will also need hepatology follow up as out pt and SBP ppx once current abx regimen is completed. Patient with Hb 7 on day of discharge, asymptomatic and hemodynamically stable, platelets still low but continuing to uptrend at 60. Patient given 1u PRBCs with appropriate response, cleared for discharge by Hepatology. Patient is a 64M with pmh DM2, HLD, obesity, BPH, Orthostatic hypotension, GERD, HTN, Neuropathy, Obesity, HFpEF with mild LV diastolic dysfunction, and decompensated JEAN cirrhosis complicated by ascites, history of SBP, hepatic encephalopathy, and chronic anemia with a history of duodenal ulcer and duodenal AVM who is UNOS listed for liver transplantation at [facility] admitted with fall at home, general weakness/syncope. Collateral information obtained from chart review as patient is not communicative during evaluation. During admission patient had CT scan which showed diffuse low density appearance of the prostate concerning for abscess while patient reporting difficulty with urination but denying dysuria, urgency, frequency, gross hematuria, or rectal pain/pressure. Patient was take to OR with urology on 3/15/20 and underwent cystoscopy and transurethral drainage of prostatic abscess. Postop patient developed hypotension, altered mental status and bright red blood in bob bag. SICU consulted for evaluation. Vasopressor support was initiated with Levo and Vaso and MTP was activated, 4 units of FFP and 4units of PRBC were transfused, also received 1unit platelets and vitamin K. Pt subsequently stabilized and transferred to medicine service. Pt was noted to have low AM cortisol. Endocrinology was consulted, results of co-syntropin stim test were inconstient with adrenal insufficiency. CBG still pending. ID followed pt and pt had midline placed for 4 weeks of ertapenem (from the time of prostate drainage 3/25-4/11). Pt will also need CBC w/ diff and CMP weekly while on ertapenem. Results should be faxed to [phone]. Pt will need ID follow up as an outpt in 3-4 weeks. Pt also had a bob placed which per urology should remain in place for 4 weeks while healing. Pt will need outpt followup with urology. Pt will also need hepatology follow up as out pt and SBP ppx once current abx regimen is completed. Patient with Hb 6.8 on day prior to discharge, notably with intermittently bloody urine, asymptomatic and hemodynamically stable, platelets still low but continuing to uptrend at 60. Patient given 1u PRBCs with appropriate response, cleared for discharge by Hepatology and infectious disease. No ID contraindication to reactivating patient on liver transplant list. Patient medically stable for discharge to home with IV antibiotics and close follow-up with hepatology and ID.

## 2020-03-21 NOTE — PROGRESS NOTE ADULT - ATTENDING COMMENTS
Patient with JEAN cirrhosis and now treated for urologic abscess with resistant ecoli.  suggest focus on nutritional support with increased protein rich supplements. and physical therapy.  Currently listed for liver transplant , but not able to proceed with current infection.  Continue antibiotic managemen tas per ID.

## 2020-03-21 NOTE — DISCHARGE NOTE PROVIDER - NSDCCPCAREPLAN_GEN_ALL_CORE_FT
PRINCIPAL DISCHARGE DIAGNOSIS  Diagnosis: Prostate abscess  Assessment and Plan of Treatment: You presented to the hospital with weakness and a fall. You were found to have a urinary tract infection with an abscess in your prostate. You were treated with antibiotics and the abscess was drained surgically by the urologists.  A urinary catheter was also placed and will remain in place for the next four weeks as the area heals. A longterm IV line was placed in your left arm so you can continue to get IV antibiotics at home. Please continue to take the IV antibiotic Ertapenem 1g every 24 hours through 4/11/20 to complete a four week course. Please get weekly blood tests to monitor your blood counts while taking IV antibiotics and have the results faxed to [phone]. Please follow-up with the urologist and the infectious disease specialists.      SECONDARY DISCHARGE DIAGNOSES  Diagnosis: Sepsis secondary to UTI  Assessment and Plan of Treatment: You presented to the hospital with weakness and a fall. You were found to have a urinary tract infection with an abscess in your prostate. You were treated with antibiotics and the abscess was drained surgically by the urologists. A longterm IV line was placed in your left arm so you can continue to get IV antibiotics at home. Please continue to take the IV antibiotic Ertapenem 1g every 24 hours through 4/11/20 to complete a four week course. Please get weekly blood tests to monitor your blood counts while taking IV antibiotics and have the results faxed to [phone]. Please follow-up with the urologist and the infectious disease specialists for further management of your urinary catheter and your IV antibiotics    Diagnosis: Cirrhosis of liver  Assessment and Plan of Treatment: You have a history of liver cirrhosis and are on the list for a liver transplant. Please continue to take the medication Lactulose syrup 30grams by mouth three times per day for a goal of 3-4 bowel movements per day. Please also continue to take the medication Rifaximin 550mg by mouth two times per day and Midodrine 30mg by mouth three times per day. Please follow-up with your hepatologist within 1 week for further management of your cirrhosis. Please also discuss when to resume preventative antibiotics against bacterial infections of your abdomen, for which you are at greater risk for due to your cirrhosis.    Diagnosis: Syncope and collapse  Assessment and Plan of Treatment: You presented to the hospital after losing consciousness and falling. This is likely from your severe infection of your urine and prostate abscess. There was also initial concern that your Cortisol level was low and that you may have a condition called adrenal insufficiency, however further work-up by the endocrinology team was negative. Please follow-up with your primary care doctor and endocrinologist.    Diagnosis: Acute on chronic anemia  Assessment and Plan of Treatment: You have a history of low blood counts, related to your liver disease. Your blood count dropped acutely in the hospital due to blood loss after your procedure for your prostate abscess. You were given multiple blood transfusions and your blood count was monitored. Please follow-up with your primary care doctor within 1 week. If you experience worsening bleeding in your urine, lightheadedness or dizziness, please seek medical care.    Diagnosis: Type 2 diabetes mellitus with ketoacidosis without coma, with long-term current use of insulin  Assessment and Plan of Treatment: You have a history of diabetes for which you take insulin. Your blood sugars were monitored in the hospital and your insuling dosages were adjusted accordingly. Please    Diagnosis: Orthostatic hypotension  Assessment and Plan of Treatment: PRINCIPAL DISCHARGE DIAGNOSIS  Diagnosis: Prostate abscess  Assessment and Plan of Treatment: You presented to the hospital with weakness and a fall. You were found to have a urinary tract infection with an abscess in your prostate. You were treated with antibiotics and the abscess was drained surgically by the urologists.  A urinary catheter was also placed and will remain in place for the next four weeks as the area heals. A longterm IV line was placed in your left arm so you can continue to get IV antibiotics at home. Please continue to take the IV antibiotic Ertapenem 1g every 24 hours through 4/11/20 to complete a four week course. Please get weekly blood tests to monitor your blood counts while taking IV antibiotics and have the results faxed to [phone]. Please follow-up with the urologist and the infectious disease specialists.      SECONDARY DISCHARGE DIAGNOSES  Diagnosis: Sepsis secondary to UTI  Assessment and Plan of Treatment: You presented to the hospital with weakness and a fall. You were found to have a urinary tract infection with an abscess in your prostate. You were treated with antibiotics and the abscess was drained surgically by the urologists. A longterm IV line was placed in your left arm so you can continue to get IV antibiotics at home. Please continue to take the IV antibiotic Ertapenem 1g every 24 hours through 4/11/20 to complete a four week course. Please get weekly blood tests to monitor your blood counts while taking IV antibiotics and have the results faxed to [phone]. Please follow-up with the urologist and the infectious disease specialists for further management of your urinary catheter and your IV antibiotics    Diagnosis: Cirrhosis of liver  Assessment and Plan of Treatment: You have a history of liver cirrhosis and are on the list for a liver transplant. Please continue to take the medication Lactulose syrup 30grams by mouth three times per day for a goal of 3-4 bowel movements per day. Please also continue to take the medication Rifaximin 550mg by mouth two times per day and Midodrine 30mg by mouth three times per day. Please follow-up with your hepatologist within 1 week for further management of your cirrhosis. Please also discuss when to resume preventative antibiotics against bacterial infections of your abdomen, for which you are at greater risk for due to your cirrhosis.    Diagnosis: Syncope and collapse  Assessment and Plan of Treatment: You presented to the hospital after losing consciousness and falling. This is likely from your severe infection of your urine and prostate abscess. There was also initial concern that your Cortisol level was low and that you may have a condition called adrenal insufficiency, however further work-up by the endocrinology team was negative. Please follow-up with your primary care doctor and endocrinologist.    Diagnosis: Acute on chronic anemia  Assessment and Plan of Treatment: You have a history of low blood counts, related to your liver disease. Your blood count dropped acutely in the hospital due to blood loss after your procedure for your prostate abscess. You were given multiple blood transfusions and your blood count was monitored. Please follow-up with your primary care doctor within 1 week. If you experience worsening bleeding in your urine, lightheadedness or dizziness, please seek medical care.    Diagnosis: Type 2 diabetes mellitus with ketoacidosis without coma, with long-term current use of insulin  Assessment and Plan of Treatment: You have a history of diabetes for which you take insulin. Your blood sugars were monitored in the hospital and your insuling dosages were adjusted accordingly. Please take your insulin:    Diagnosis: Orthostatic hypotension  Assessment and Plan of Treatment: PRINCIPAL DISCHARGE DIAGNOSIS  Diagnosis: Prostate abscess  Assessment and Plan of Treatment: You presented to the hospital with weakness and a fall. You were found to have a urinary tract infection with an abscess in your prostate. You were treated with antibiotics and the abscess was drained surgically by the urologists.  A urinary catheter was also placed and will remain in place for the next four weeks as the area heals. A longterm IV line was placed in your left arm so you can continue to get IV antibiotics at home. Please continue to take the IV antibiotic Ertapenem 1g every 24 hours through 4/11/20 to complete a four week course. Please get weekly blood tests (CBC, CMP) while taking IV antibiotics and have the results faxed to [phone]. Please follow-up with the urologist and the infectious disease specialists.      SECONDARY DISCHARGE DIAGNOSES  Diagnosis: Sepsis secondary to UTI  Assessment and Plan of Treatment: You presented to the hospital with weakness and a fall. You were found to have a urinary tract infection with an abscess in your prostate. You were treated with antibiotics and the abscess was drained surgically by the urologists. A longterm IV line was placed in your left arm so you can continue to get IV antibiotics at home. Please continue to take the IV antibiotic Ertapenem 1g every 24 hours through 4/11/20 to complete a four week course. Please get weekly blood tests (CBC, CMP) while taking IV antibiotics and have the results faxed to [phone]. Please follow-up with the urologist and the infectious disease specialists for further management of your urinary catheter and your IV antibiotics    Diagnosis: Cirrhosis of liver  Assessment and Plan of Treatment: You have a history of liver cirrhosis and are on the list for a liver transplant. Please continue to take the medication Lactulose syrup 30grams by mouth three times per day for a goal of 3-4 bowel movements per day. Please also continue to take the medication Rifaximin 550mg by mouth two times per day and Midodrine 30mg by mouth three times per day. Please take the medication Lactulose 45mL by mouth three times per day. Please STOP taking your diuretic medications Spironolactone and Lasix. Please follow-up with your hepatologist within 1 week for further management of your cirrhosis. Please also discuss when to resume your diuretics and when to start preventative oral antibiotics against bacterial infections of your abdomen, for which you are at greater risk for due to your cirrhosis.    Diagnosis: Syncope and collapse  Assessment and Plan of Treatment: You presented to the hospital after losing consciousness and falling. This is likely from your severe infection of your urine and prostate abscess. There was also initial concern that your Cortisol level was low and that you may have a condition called adrenal insufficiency, however further work-up by the endocrinology team is not consistent with this condition. Please follow-up with your endocrinologist regarding a pending lab test (CBG). Please also follow-up with your primary care doctor.    Diagnosis: Acute on chronic anemia  Assessment and Plan of Treatment: You have a history of low blood counts, related to your liver disease. Your blood count dropped acutely in the hospital due to blood loss after your procedure for your prostate abscess. You were given multiple blood transfusions and your blood count stabilized. Please follow-up with your primary care doctor within 1 week. If you experience worsening bleeding in your urine, lightheadedness or dizziness, please call your doctor or seek medical care.    Diagnosis: Orthostatic hypotension  Assessment and Plan of Treatment: You have a history of orthostatic hypotension, for which you take the medication Midodrine 30mg by mouth three times per day. Please continue to take this medication as prescribed.    Diagnosis: Type 2 diabetes mellitus with ketoacidosis without coma, with long-term current use of insulin  Assessment and Plan of Treatment: You have a history of diabetes for which you take insulin. Your blood sugars were monitored in the hospital and your insuling dosages were adjusted accordingly. Please take your insulin with these adjusted dosages: Humalog 3 units injected subcutaneously three times a day with meals and Lantus 12 units injected subcutaneously at bedtime.

## 2020-03-21 NOTE — PROGRESS NOTE ADULT - SUBJECTIVE AND OBJECTIVE BOX
PROGRESS NOTE:   Authored by Gerry Healy MD, PGY 1, Pager [phone] [facility], [address] [facility]     Patient is a 64y old  Male who presents with a chief complaint of Syncopal episode at home at 0430 this am, LOC for approx 3 minutes per wife, pt had episode of shaking during that time. Hx of multiple falls. Pt reporting abdominal pain for X 1day, no nausea or vomiting, endorses loose stool on lactulose at home (21 Mar 2020 07:12)      SUBJECTIVE / OVERNIGHT EVENTS: Pt with continued bleeding from bob.       MEDICATIONS  (STANDING):  chlorhexidine 2% Cloths 1 Application(s) Topical <User Schedule>  dextrose 5%. 1000 milliLiter(s) (50 mL/Hr) IV Continuous <Continuous>  dextrose 50% Injectable 12.5 Gram(s) IV Push once  dextrose 50% Injectable 25 Gram(s) IV Push once  dextrose 50% Injectable 25 Gram(s) IV Push once  ertapenem  IVPB 1000 milliGRAM(s) IV Intermittent every 24 hours  folic acid 1 milliGRAM(s) Oral daily  insulin glargine Injectable (LANTUS) 15 Unit(s) SubCutaneous at bedtime  insulin lispro (HumaLOG) corrective regimen sliding scale   SubCutaneous three times a day before meals  insulin lispro (HumaLOG) corrective regimen sliding scale   SubCutaneous at bedtime  lactulose Syrup 30 Gram(s) Oral three times a day  midodrine. 30 milliGRAM(s) Oral every 8 hours  pantoprazole    Tablet 40 milliGRAM(s) Oral before breakfast  rifAXIMin 550 milliGRAM(s) Oral two times a day  tamsulosin 0.4 milliGRAM(s) Oral at bedtime    MEDICATIONS  (PRN):  dextrose 40% Gel 15 Gram(s) Oral once PRN Blood Glucose LESS THAN 70 milliGRAM(s)/deciLiter  glucagon  Injectable 1 milliGRAM(s) IntraMuscular once PRN Glucose <70 milliGRAM(s)/deciLiter      CAPILLARY BLOOD GLUCOSE      POCT Blood Glucose.: 170 mg/dL (20 Mar 2020 21:57)  POCT Blood Glucose.: 164 mg/dL (20 Mar 2020 17:24)  POCT Blood Glucose.: 143 mg/dL (20 Mar 2020 12:39)  POCT Blood Glucose.: 128 mg/dL (20 Mar 2020 08:34)    I&O's Summary    20 Mar 2020 07:01  -  21 Mar 2020 07:00  --------------------------------------------------------  IN: 120 mL / OUT: 2525 mL / NET: -2405 mL        PHYSICAL EXAM:  Vital Signs Last 24 Hrs  T(C): 36.6 (21 Mar 2020 06:05), Max: 36.9 (20 Mar 2020 09:33)  T(F): 97.9 (21 Mar 2020 06:05), Max: 98.4 (20 Mar 2020 09:33)  HR: 74 (21 Mar 2020 06:05) (73 - 82)  BP: 110/63 (21 Mar 2020 06:05) (105/62 - 121/66)  BP(mean): --  RR: 16 (21 Mar 2020 06:05) (16 - 18)  SpO2: 99% (21 Mar 2020 06:05) (97% - 100%)    CONSTITUTIONAL: NAD  RESPIRATORY: Normal respiratory effort; lungs are clear to auscultation bilaterally  CARDIOVASCULAR: Regular rate and rhythm, normal S1 and S2, no murmur/rub/gallop  ABDOMEN: Nontender to palpation, Soft, Non-distended, normoactive bowel sounds,   MUSCLOSKELETAL: No LE edema   NEURO: Alert, good concentration     LABS:                        7.3    5.56  )-----------( 56       ( 21 Mar 2020 06:42 )             23.2     03-21    135  |  103  |  23  ----------------------------<  98  4.7   |  23  |  1.05    Ca    9.7      21 Mar 2020 06:42  Phos  2.7     03-21  Mg     1.9     03-21    TPro  5.6<L>  /  Alb  2.9<L>  /  TBili  7.4<H>  /  DBili  x   /  AST  39  /  ALT  30  /  AlkPhos  148<H>  03-21    PT/INR - ( 20 Mar 2020 07:18 )   PT: 25.4 sec;   INR: 2.18 ratio         PTT - ( 20 Mar 2020 07:18 )  PTT:51.1 sec            RADIOLOGY & ADDITIONAL TESTS:  Results Reviewed. PROGRESS NOTE:   Authored by Gerry Healy MD, PGY 1, Pager [phone] [facility], [id number] [facility]     Patient is a 64y old  Male who presents with a chief complaint of Syncopal episode at home at 0430 this am, LOC for approx 3 minutes per wife, pt had episode of shaking during that time. Hx of multiple falls. Pt reporting abdominal pain for X 1day, no nausea or vomiting, endorses loose stool on lactulose at home (21 Mar 2020 07:12)      SUBJECTIVE / OVERNIGHT EVENTS: Pt with continued bleeding from bob. Pt seen and examined at bedside this AM. Pt denies chest pain, SOB, abd pain, nausea or vomiting.       MEDICATIONS  (STANDING):  chlorhexidine 2% Cloths 1 Application(s) Topical <User Schedule>  dextrose 5%. 1000 milliLiter(s) (50 mL/Hr) IV Continuous <Continuous>  dextrose 50% Injectable 12.5 Gram(s) IV Push once  dextrose 50% Injectable 25 Gram(s) IV Push once  dextrose 50% Injectable 25 Gram(s) IV Push once  ertapenem  IVPB 1000 milliGRAM(s) IV Intermittent every 24 hours  folic acid 1 milliGRAM(s) Oral daily  insulin glargine Injectable (LANTUS) 15 Unit(s) SubCutaneous at bedtime  insulin lispro (HumaLOG) corrective regimen sliding scale   SubCutaneous three times a day before meals  insulin lispro (HumaLOG) corrective regimen sliding scale   SubCutaneous at bedtime  lactulose Syrup 30 Gram(s) Oral three times a day  midodrine. 30 milliGRAM(s) Oral every 8 hours  pantoprazole    Tablet 40 milliGRAM(s) Oral before breakfast  rifAXIMin 550 milliGRAM(s) Oral two times a day  tamsulosin 0.4 milliGRAM(s) Oral at bedtime    MEDICATIONS  (PRN):  dextrose 40% Gel 15 Gram(s) Oral once PRN Blood Glucose LESS THAN 70 milliGRAM(s)/deciLiter  glucagon  Injectable 1 milliGRAM(s) IntraMuscular once PRN Glucose <70 milliGRAM(s)/deciLiter      CAPILLARY BLOOD GLUCOSE      POCT Blood Glucose.: 170 mg/dL (20 Mar 2020 21:57)  POCT Blood Glucose.: 164 mg/dL (20 Mar 2020 17:24)  POCT Blood Glucose.: 143 mg/dL (20 Mar 2020 12:39)  POCT Blood Glucose.: 128 mg/dL (20 Mar 2020 08:34)    I&O's Summary    20 Mar 2020 07:01  -  21 Mar 2020 07:00  --------------------------------------------------------  IN: 120 mL / OUT: 2525 mL / NET: -2405 mL        PHYSICAL EXAM:  Vital Signs Last 24 Hrs  T(C): 36.6 (21 Mar 2020 06:05), Max: 36.9 (20 Mar 2020 09:33)  T(F): 97.9 (21 Mar 2020 06:05), Max: 98.4 (20 Mar 2020 09:33)  HR: 74 (21 Mar 2020 06:05) (73 - 82)  BP: 110/63 (21 Mar 2020 06:05) (105/62 - 121/66)  BP(mean): --  RR: 16 (21 Mar 2020 06:05) (16 - 18)  SpO2: 99% (21 Mar 2020 06:05) (97% - 100%)    CONSTITUTIONAL: NAD  RESPIRATORY: Normal respiratory effort; lungs are clear to auscultation bilaterally  CARDIOVASCULAR: Regular rate and rhythm, normal S1 and S2, no murmur/rub/gallop  ABDOMEN: Large abd, nontender to palpation, Soft, + bowel sounds   MUSCLOSKELETAL: No LE edema   NEURO: Alert, good concentration     LABS:                        7.3    5.56  )-----------( 56       ( 21 Mar 2020 06:42 )             23.2     03-21    135  |  103  |  23  ----------------------------<  98  4.7   |  23  |  1.05    Ca    9.7      21 Mar 2020 06:42  Phos  2.7     03-21  Mg     1.9     03-21    TPro  5.6<L>  /  Alb  2.9<L>  /  TBili  7.4<H>  /  DBili  x   /  AST  39  /  ALT  30  /  AlkPhos  148<H>  03-21    PT/INR - ( 20 Mar 2020 07:18 )   PT: 25.4 sec;   INR: 2.18 ratio         PTT - ( 20 Mar 2020 07:18 )  PTT:51.1 sec            RADIOLOGY & ADDITIONAL TESTS:  Results Reviewed.

## 2020-03-21 NOTE — PROGRESS NOTE ADULT - PROBLEM SELECTOR PLAN 9
- FSG QAC/ HS and ISS  - c/w Lantus 15 units QHS  - Premeal humalog d/c'd as pt with variable PO intake   -A1C = 6.4 on 2/2020  -Discharge on basal/bolus  -f/u outpt w/Dr Goldberg

## 2020-03-21 NOTE — PROGRESS NOTE ADULT - PROBLEM SELECTOR PLAN 1
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 Coli.    - C/w ertapenem. Will need 4 wks of antibiotics from drainage given likely incompletely drained prostatic abscess. Abx via midline   - s/p TURP and drainage of abscess  - ID following, recs appreciated  - Urology following, recs appreciated. Pt's Bob flushed. Do not remove bob catheter for 4+ weeks as pt heals.

## 2020-03-21 NOTE — PROGRESS NOTE ADULT - SUBJECTIVE AND OBJECTIVE BOX
Chief Complaint:  Patient is a 64y old  Male who presents with a chief complaint of Syncopal episode at home at 0430 this am, LOC for approx 3 minutes per wife, pt had episode of shaking during that time. Hx of multiple falls. Pt reporting abdominal pain for X 1day, no nausea or vomiting, endorses loose stool on lactulose at home (21 Mar 2020 07:29)    Interval Events:   No acute overnight events. Patient denies any specific complaints.     Allergies:  codeine (Anaphylaxis)    Hospital Medications:  chlorhexidine 2% Cloths 1 Application(s) Topical <User Schedule>  dextrose 40% Gel 15 Gram(s) Oral once PRN  dextrose 5%. 1000 milliLiter(s) IV Continuous <Continuous>  dextrose 50% Injectable 12.5 Gram(s) IV Push once  dextrose 50% Injectable 25 Gram(s) IV Push once  dextrose 50% Injectable 25 Gram(s) IV Push once  ertapenem  IVPB 1000 milliGRAM(s) IV Intermittent every 24 hours  folic acid 1 milliGRAM(s) Oral daily  glucagon  Injectable 1 milliGRAM(s) IntraMuscular once PRN  insulin glargine Injectable (LANTUS) 15 Unit(s) SubCutaneous at bedtime  insulin lispro (HumaLOG) corrective regimen sliding scale   SubCutaneous three times a day before meals  insulin lispro (HumaLOG) corrective regimen sliding scale   SubCutaneous at bedtime  lactulose Syrup 30 Gram(s) Oral three times a day  midodrine. 30 milliGRAM(s) Oral every 8 hours  pantoprazole    Tablet 40 milliGRAM(s) Oral before breakfast  rifAXIMin 550 milliGRAM(s) Oral two times a day  tamsulosin 0.4 milliGRAM(s) Oral at bedtime    PMHX/PSHX:  GIB (gastrointestinal bleeding)  GERD with esophagitis  Hepatic encephalopathy  Obesity  Fatty liver disease, nonalcoholic  Renal stones  Hypertension  Neuropathy  Hypercholesteremia  Diabetes  S/P cholecystectomy  No significant past surgical history    ROS:   General:  No fevers, chills  ENT:  No sore throat or dysphagia  CV:  No pain or palpitations  Resp:  No dyspnea, cough or  wheezing  GI:  No pain, No nausea, No vomiting, No rectal bleeding, No tarry stools,  Skin:  No rash or edema    PHYSICAL EXAM:   Vital Signs:  Vital Signs Last 24 Hrs  T(C): 36.4 (21 Mar 2020 07:49), Max: 36.9 (20 Mar 2020 09:33)  T(F): 97.5 (21 Mar 2020 07:49), Max: 98.4 (20 Mar 2020 09:33)  HR: 75 (21 Mar 2020 07:49) (73 - 82)  BP: 121/67 (21 Mar 2020 07:49) (105/62 - 121/67)  BP(mean): --  RR: 16 (21 Mar 2020 07:49) (16 - 18)  SpO2: 100% (21 Mar 2020 07:49) (97% - 100%)  Daily     Daily     GENERAL:  Appears stated age, well-groomed, well-nourished, no distress  CHEST:  Full & symmetric excursion, no increased effort, breath sounds clear  HEART:  Regular rhythm, S1, S2, no JVD  ABDOMEN:  Soft, non-tender, non-distended, normoactive bowel sounds  EXTREMITIES:  no cyanosis, clubbing or edema  SKIN:  Jaundiced  NEURO:  Alert, oriented    LABS:                        7.3    5.56  )-----------( 56       ( 21 Mar 2020 06:42 )             23.2     Mean Cell Volume: 101.3 fl (03-21-20 @ 06:42)    03-21    135  |  103  |  23  ----------------------------<  98  4.7   |  23  |  1.05    Ca    9.7      21 Mar 2020 06:42  Phos  2.7     03-21  Mg     1.9     03-21    TPro  5.6<L>  /  Alb  2.9<L>  /  TBili  7.4<H>  /  DBili  x   /  AST  39  /  ALT  30  /  AlkPhos  148<H>  03-21    LIVER FUNCTIONS - ( 21 Mar 2020 06:42 )  Alb: 2.9 g/dL / Pro: 5.6 g/dL / ALK PHOS: 148 U/L / ALT: 30 U/L / AST: 39 U/L / GGT: x           PT/INR - ( 20 Mar 2020 07:18 )   PT: 25.4 sec;   INR: 2.18 ratio         PTT - ( 20 Mar 2020 07:18 )  PTT:51.1 sec                            7.3    5.56  )-----------( 56       ( 21 Mar 2020 06:42 )             23.2                         7.1    6.00  )-----------( 57       ( 20 Mar 2020 07:18 )             21.7                         7.9    6.67  )-----------( 53       ( 19 Mar 2020 06:58 )             23.2                         7.5    7.21  )-----------( 52       ( 18 Mar 2020 23:14 )             22.9       Imaging:

## 2020-03-21 NOTE — PROGRESS NOTE ADULT - ASSESSMENT
64 M hx of DM, HLD, GERD, HFpEF with mild LV diastolic dysfunction, and decompensated JEAN cirrhosis c/b ascites with hx of SBP, HE, duodenal ulcer, GAVE and duodenal AVM s/p APC and recent admission (2/2020) for septic shock 2/2 to emphysematous cystitis with E. coli bacteremia  presents for second admission for fall in one month in the setting of possible vasovagal episode with evidence of urosepsis.    Impression:  # Decompensated JEAN Cirrhosis: UNOS listed for liver transplant. MELD-Na 26 (3/20)  -Ascites: Trace on CT abdomen/pelvis from 03/11/20. Hx of SBP  -Varices: Small non-bleeding EV on EGD from 12/2019.  -HCC: No lesions on CT with con from 3/12/20  -PSE: Currently AOx3, on Lactulose + Rifaximin  # Recurrent Urosepsis with ESBL prostatic abscess s/p OR for I&D 3/15/20. Currently on Ertapenem: Now Afebrile with resolved leukocytosis.   Emphesematous cystitis seen on during prior hospitalization with hx of ESBL Ecoli. CT scan during this admission showing enlarged prostate concerning for abscess. Questionable fistula on CT  # Recurrent falls: Differential includes deconditioning vs. diabetic neuropathy.     Recommendations:  - Continue IV Abx  === Will need to resume SBP PPx once current regimen is completed  - ID and urology following, f/u recommendations  - Hold diuretics for now  - Continue with midodrine 30mg TID  - Continue Lactulose and Rifaximin   - Supportive care per primary team    Fawn Velázquez MD  Gastroenterology Fellow  [phone]  Please page on call fellow on weekends and after 5pm on weekdays

## 2020-03-21 NOTE — DISCHARGE NOTE PROVIDER - CARE PROVIDER_API CALL
Eusebio Pérez)  Internal Medicine  [address] [place] [address]  Phone: [phone]  Fax: [phone]  Follow Up Time: 1 week    Yomi Medina)  Gastroenterology; Transplant Hepatology  [address] [place] [address]  Phone: [phone]  Fax: [phone]  Follow Up Time: 1 week

## 2020-03-22 LAB
ALBUMIN SERPL ELPH-MCNC: 2.6 G/DL — LOW (ref 3.3–5)
ALP SERPL-CCNC: 137 U/L — HIGH (ref 40–120)
ALT FLD-CCNC: 25 U/L — SIGNIFICANT CHANGE UP (ref 10–45)
ANION GAP SERPL CALC-SCNC: 9 MMOL/L — SIGNIFICANT CHANGE UP (ref 5–17)
APTT BLD: 50.7 SEC — HIGH (ref 27.5–36.3)
AST SERPL-CCNC: 34 U/L — SIGNIFICANT CHANGE UP (ref 10–40)
BILIRUB SERPL-MCNC: 7.2 MG/DL — HIGH (ref 0.2–1.2)
BUN SERPL-MCNC: 22 MG/DL — SIGNIFICANT CHANGE UP (ref 7–23)
CALCIUM SERPL-MCNC: 9.1 MG/DL — SIGNIFICANT CHANGE UP (ref 8.4–10.5)
CHLORIDE SERPL-SCNC: 105 MMOL/L — SIGNIFICANT CHANGE UP (ref 96–108)
CO2 SERPL-SCNC: 22 MMOL/L — SIGNIFICANT CHANGE UP (ref 22–31)
CORTICOSTEROID BINDING GLOBULIN RESULT: <0.5 MG/DL — LOW
CORTIS AM PEAK SERPL-MCNC: 2.1 UG/DL — LOW (ref 6–18.4)
CORTIS PRE/P CHAL SERPL-SCNC: SIGNIFICANT CHANGE UP
CORTIS SP2 SERPL-MCNC: 15.4 UG/DL — SIGNIFICANT CHANGE UP
CREAT SERPL-MCNC: 1 MG/DL — SIGNIFICANT CHANGE UP (ref 0.5–1.3)
GLUCOSE BLDC GLUCOMTR-MCNC: 101 MG/DL — HIGH (ref 70–99)
GLUCOSE BLDC GLUCOMTR-MCNC: 171 MG/DL — HIGH (ref 70–99)
GLUCOSE BLDC GLUCOMTR-MCNC: 210 MG/DL — HIGH (ref 70–99)
GLUCOSE BLDC GLUCOMTR-MCNC: 227 MG/DL — HIGH (ref 70–99)
GLUCOSE SERPL-MCNC: 93 MG/DL — SIGNIFICANT CHANGE UP (ref 70–99)
HCT VFR BLD CALC: 21.6 % — LOW (ref 39–50)
HGB BLD-MCNC: 7.3 G/DL — LOW (ref 13–17)
INR BLD: 2.14 RATIO — HIGH (ref 0.88–1.16)
MAGNESIUM SERPL-MCNC: 1.8 MG/DL — SIGNIFICANT CHANGE UP (ref 1.6–2.6)
MCHC RBC-ENTMCNC: 33.6 PG — SIGNIFICANT CHANGE UP (ref 27–34)
MCHC RBC-ENTMCNC: 33.8 GM/DL — SIGNIFICANT CHANGE UP (ref 32–36)
MCV RBC AUTO: 99.5 FL — SIGNIFICANT CHANGE UP (ref 80–100)
NRBC # BLD: 0 /100 WBCS — SIGNIFICANT CHANGE UP (ref 0–0)
PHOSPHATE SERPL-MCNC: 2.8 MG/DL — SIGNIFICANT CHANGE UP (ref 2.5–4.5)
PLATELET # BLD AUTO: 57 K/UL — LOW (ref 150–400)
POTASSIUM SERPL-MCNC: 4.5 MMOL/L — SIGNIFICANT CHANGE UP (ref 3.5–5.3)
POTASSIUM SERPL-SCNC: 4.5 MMOL/L — SIGNIFICANT CHANGE UP (ref 3.5–5.3)
PROT SERPL-MCNC: 5.2 G/DL — LOW (ref 6–8.3)
PROTHROM AB SERPL-ACNC: 25.2 SEC — HIGH (ref 10–13.1)
RBC # BLD: 2.17 M/UL — LOW (ref 4.2–5.8)
RBC # FLD: 21.2 % — HIGH (ref 10.3–14.5)
SODIUM SERPL-SCNC: 136 MMOL/L — SIGNIFICANT CHANGE UP (ref 135–145)
WBC # BLD: 4.9 K/UL — SIGNIFICANT CHANGE UP (ref 3.8–10.5)
WBC # FLD AUTO: 4.9 K/UL — SIGNIFICANT CHANGE UP (ref 3.8–10.5)

## 2020-03-22 PROCEDURE — 99232 SBSQ HOSP IP/OBS MODERATE 35: CPT

## 2020-03-22 PROCEDURE — 99233 SBSQ HOSP IP/OBS HIGH 50: CPT | Mod: GC

## 2020-03-22 RX ORDER — INSULIN GLARGINE 100 [IU]/ML
12 INJECTION, SOLUTION SUBCUTANEOUS AT BEDTIME
Refills: 0 | Status: DISCONTINUED | OUTPATIENT
Start: 2020-03-22 | End: 2020-03-24

## 2020-03-22 RX ORDER — INSULIN LISPRO 100/ML
3 VIAL (ML) SUBCUTANEOUS
Refills: 0 | Status: DISCONTINUED | OUTPATIENT
Start: 2020-03-22 | End: 2020-03-24

## 2020-03-22 RX ADMIN — CHLORHEXIDINE GLUCONATE 1 APPLICATION(S): 213 SOLUTION TOPICAL at 05:36

## 2020-03-22 RX ADMIN — Medication 2: at 17:45

## 2020-03-22 RX ADMIN — Medication 1: at 12:23

## 2020-03-22 RX ADMIN — MIDODRINE HYDROCHLORIDE 30 MILLIGRAM(S): 2.5 TABLET ORAL at 14:21

## 2020-03-22 RX ADMIN — MIDODRINE HYDROCHLORIDE 30 MILLIGRAM(S): 2.5 TABLET ORAL at 05:34

## 2020-03-22 RX ADMIN — LACTULOSE 30 GRAM(S): 10 SOLUTION ORAL at 05:35

## 2020-03-22 RX ADMIN — Medication 1 MILLIGRAM(S): at 11:07

## 2020-03-22 RX ADMIN — COSYNTROPIN 0.25 MILLIGRAM(S): 0.25 INJECTION, SOLUTION INTRAVENOUS at 05:35

## 2020-03-22 RX ADMIN — MIDODRINE HYDROCHLORIDE 30 MILLIGRAM(S): 2.5 TABLET ORAL at 22:22

## 2020-03-22 RX ADMIN — ERTAPENEM SODIUM 120 MILLIGRAM(S): 1 INJECTION, POWDER, LYOPHILIZED, FOR SOLUTION INTRAMUSCULAR; INTRAVENOUS at 14:27

## 2020-03-22 RX ADMIN — LACTULOSE 30 GRAM(S): 10 SOLUTION ORAL at 14:21

## 2020-03-22 RX ADMIN — Medication 3 UNIT(S): at 17:46

## 2020-03-22 RX ADMIN — PANTOPRAZOLE SODIUM 40 MILLIGRAM(S): 20 TABLET, DELAYED RELEASE ORAL at 05:35

## 2020-03-22 RX ADMIN — TAMSULOSIN HYDROCHLORIDE 0.4 MILLIGRAM(S): 0.4 CAPSULE ORAL at 22:22

## 2020-03-22 RX ADMIN — INSULIN GLARGINE 12 UNIT(S): 100 INJECTION, SOLUTION SUBCUTANEOUS at 22:23

## 2020-03-22 NOTE — PROGRESS NOTE ADULT - ASSESSMENT
64y male s/p TUR of prostate abscess  - Continue bob catheter, likely will require for 3-4 weeks and cystogram to confirm decreased size in cavity/bladder fistula.  Explained to patient who understands, can be done outpatient.   - Bedside ultrasound done to evaluate placement of bob 3/20 - bob balloon visualized in the bladder.   - Keep bob, call urology for any concerns, do not manipulate, leave balloon filled at all times.  - Continue IV ABX, OR Cx growing ESBL E Coli  - Monitor potassium.  - please call if any concerns with bob output quantity, color or any other urologic concerns.

## 2020-03-22 NOTE — PROGRESS NOTE ADULT - ASSESSMENT
64M with DM2, dyslipidemia, obesity, BPH, Orthostatic hypotension, GERD, HTN, Neuropathy, Obesity, HFpEF with mild LV diastolic dysfunction, and decompensated JEAN cirrhosis complicated by ascites, history of SBP, hepatic encephalopathy, and chronic anemia with a history of duodenal ulcer as well as GAVE and duodenal AVM s/p APC (last on 10/11/19), who is UNOS listed for liver transplantation at [facility] who presents with fall at home, general weakness and ? syncope 2/2 sepsis from UTI w/ prostatic abscess.

## 2020-03-22 NOTE — PROGRESS NOTE ADULT - PROBLEM SELECTOR PLAN 9
- FSG QAC/ HS and ISS  - c/w Lantus 15 units QHS  - Premeal humalog d/c'd as pt with variable PO intake   -A1C = 6.4 on 2/2020  -Discharge on basal/bolus  -f/u outpt w/Dr Goldberg - FSG QAC/ HS and ISS  - Lantus 13 units QHS  - Premeal humalog d/c'd as pt with variable PO intake   -A1C = 6.4 on 2/2020  -Discharge on basal/bolus  -f/u outpt w/Dr Goldberg

## 2020-03-22 NOTE — PROGRESS NOTE ADULT - ASSESSMENT
64M with DM2,  dyslipidemia, obesity, BPH, Orthostatic hypotension, GERD, HTN, Neuropathy, Obesity, HFpEF with mild LV diastolic dysfunction, and decompensated JEAN cirrhosis complicated by ascites, history of SBP, hepatic encephalopathy, and chronic anemia with a history of duodenal ulcer as well as GAVE and duodenal AVM s/p APC (last on 10/11/19), who is UNOS listed for liver transplantation at [facility] who presents with fall at home, general weakness and ? syncope in setting of UTI/abscess.  He is s/p TURP, and now gross hematuria.  Had AMS and hypotension, likely a component of both septic and hypovolemic shock.  s/p mm blood products, and not on IV pressor support    - Continue to trend H/H and transfuse as needed  - pressors off, but is very high dose midodrine    - Hold diuretics as not vol ol  - Avoid QTC prolonging drugs  - Monitor and replete electrolytes. Keep K>4.0 and Mg>2.0. Replete Mg today  - cont abx  - No signs of significant ischemia. Cath with non obs disease  - echo in 2/2020 with normal LV function. No need to repeat.    - Further cardiac workup will depend on clinical course.   - All other workup per primary team. Will followup.

## 2020-03-22 NOTE — PROGRESS NOTE ADULT - PROBLEM SELECTOR PLAN 2
- Endo following, recs appreciated   - Low AM cortisol level   - Per Endo: Clinically no evidence of adrenal insufficiency. Low cortisol level in this case most likely due to low CBG from cirrhosis.  -corticosteroid binding globulin pending 3/15. - Endo following, recs appreciated   - Low AM cortisol level   - Per Endo: Clinically no evidence of adrenal insufficiency. Low cortisol level in this case most likely due to low CBG from cirrhosis.  -Stimulation performed 3/22 AM. F/u with results.

## 2020-03-22 NOTE — CHART NOTE - NSCHARTNOTEFT_GEN_A_CORE
BG and lab values reviewed for past 24 hours.     T2DM/Plan:  -test BG AC/HS  -Decrease Lantus 12 units QHS  -c/w Humalog low correction scale AC and Low HS scale  -Restart Humalog 3 units AC meeals  -Discharge on basal/bolus  -f/u outpt w/Dr Goldberg    Adrenal Insufficiency:  initial AM corisol 2.1 w/post administration of cosyntropin at 15.4  -discussed w/endocrine attending, results using delta 9 criteria displays highly unlikely that pt has adrenal insufficiency  -Can be further worked up as outpt.   -corticosteroid binding globulin pending from 3/15    Due to [facility] policy during the evolving novel coronavirus outbreak, efforts are being made to limit unnecessary patient contacts to limit the spread of disease.   Accordingly, patients without clear indication for physical exam or face to face interview from the Endocrine team will be adjusted in conjunction with conversations with primary provider team. This is being done for the safety of all the patients we care for.       please call w/any questions.     pager: [phone]  Cell: [phone]  office: [phone].

## 2020-03-22 NOTE — PROGRESS NOTE ADULT - SUBJECTIVE AND OBJECTIVE BOX
afebrile, urine clear yellow, some sediment    T(F): 97.7, Max: 98.1 (03-22-20 @ 04:45)  HR: 80  BP: 113/69  SpO2: 99%    OUT:    Indwelling Catheter - Urethral: 2950 mL  Total OUT: 2950 mL        Gen NAD  Abd soft. NTND   urine clear yellow, some sediment      03-22 @ 05:25    WBC 4.90  / Hct 21.6  / SCr 1.00     03-21 @ 08:48    WBC 6.08  / Hct 22.5  / SCr --

## 2020-03-22 NOTE — PROGRESS NOTE ADULT - SUBJECTIVE AND OBJECTIVE BOX
· Subjective and Objective:   [facility]    Sushila Dhaliwal, Doug, Morenita, Clay Rollins, hPilomena Macias      [phone]    CHIEF COMPLAINT: Patient is a 64y old  Male who presents with a chief complaint of Syncopal episode at home at 0430 this am, LOC for approx 3 minutes per wife, pt had episode of shaking during that time. Hx of multiple falls. Pt reporting abdominal pain for X 1day, no nausea or vomiting, endorses loose stool on lactulose at home (19 Mar 2020 07:43)      Follow Up: hypotension, hemorrhagic shock    Interim history: No overnight events, discussed with RN.  Pt sleeping comfortably in bed    ROS otherwise negative unless noted      PAST MEDICAL & SURGICAL HISTORY:  GIB (gastrointestinal bleeding)  GERD with esophagitis: Gastritis &amp; Non Bleeding Ulcers  Hepatic encephalopathy  Obesity  Fatty liver disease, nonalcoholic  Renal stones: 25 years ago  Hypertension  Neuropathy  Hypercholesteremia  Diabetes  S/P cholecystectomy      MEDICATIONS  (STANDING):  chlorhexidine 2% Cloths 1 Application(s) Topical <User Schedule>  dextrose 5%. 1000 milliLiter(s) (50 mL/Hr) IV Continuous <Continuous>  dextrose 50% Injectable 12.5 Gram(s) IV Push once  dextrose 50% Injectable 25 Gram(s) IV Push once  dextrose 50% Injectable 25 Gram(s) IV Push once  ertapenem  IVPB 1000 milliGRAM(s) IV Intermittent every 24 hours  folic acid 1 milliGRAM(s) Oral daily  insulin glargine Injectable (LANTUS) 13 Unit(s) SubCutaneous at bedtime  insulin lispro (HumaLOG) corrective regimen sliding scale   SubCutaneous three times a day before meals  insulin lispro (HumaLOG) corrective regimen sliding scale   SubCutaneous at bedtime  lactulose Syrup 30 Gram(s) Oral three times a day  midodrine. 30 milliGRAM(s) Oral every 8 hours  pantoprazole    Tablet 40 milliGRAM(s) Oral before breakfast  rifAXIMin 550 milliGRAM(s) Oral two times a day  tamsulosin 0.4 milliGRAM(s) Oral at bedtime      Allergies    codeine (Anaphylaxis)    Intolerances                              7.3    4.90  )-----------( 57       ( 22 Mar 2020 05:25 )             21.6       03-22    136  |  105  |  22  ----------------------------<  93  4.5   |  22  |  1.00    Ca    9.1      22 Mar 2020 05:25  Phos  2.8     03-22  Mg     1.8     03-22    TPro  5.2<L>  /  Alb  2.6<L>  /  TBili  7.2<H>  /  DBili  x   /  AST  34  /  ALT  25  /  AlkPhos  137<H>  03-22      LIVER FUNCTIONS - ( 22 Mar 2020 05:25 )  Alb: 2.6 g/dL / Pro: 5.2 g/dL / ALK PHOS: 137 U/L / ALT: 25 U/L / AST: 34 U/L / GGT: x             PT/INR - ( 21 Mar 2020 09:37 )   PT: 24.4 sec;   INR: 2.09 ratio                               Daily     Daily     I&O's Summary    21 Mar 2020 07:01  -  22 Mar 2020 07:00  --------------------------------------------------------  IN: 240 mL / OUT: 2950 mL / NET: -2710 mL        Vital Signs Last 24 Hrs  T(C): 36.5 (22 Mar 2020 07:40), Max: 36.7 (22 Mar 2020 04:45)  T(F): 97.7 (22 Mar 2020 07:40), Max: 98.1 (22 Mar 2020 04:45)  HR: 80 (22 Mar 2020 07:40) (72 - 80)  BP: 113/69 (22 Mar 2020 07:40) (98/57 - 113/69)  BP(mean): --  RR: 15 (22 Mar 2020 07:40) (15 - 18)  SpO2: 99% (22 Mar 2020 07:40) (99% - 100%)    PHYSICAL EXAM:   · Constitutional	Well-developed, well nourished  · Eyes	EOMI; PERRL; no drainage or redness  · ENMT	No oral lesions; no gross abnormalities  · Neck	No bruits; no thyromegaly or nodules  · Respiratory	Normal breath sounds b/l, No RRW  · Cardiovascular	Regular rate & rhythm, normal S1, S2; no murmurs, gallops or rubs; no S3, S4  · Gastrointestinal	Soft, non-tender, no hepatosplenomegaly, normal bowel sounds  · Extremities	No cyanosis, clubbing or edema  · Vascular	Equal and normal pulses (carotid, femoral, dorsalis pedis)  · Neurological	Alert & oriented; no sensory, motor or coordination deficits, normal reflexes

## 2020-03-22 NOTE — PROGRESS NOTE ADULT - SUBJECTIVE AND OBJECTIVE BOX
Patient is a 64y old  Male who presents with a chief complaint of Syncopal episode at home at 0430 this am, LOC for approx 3 minutes per wife, pt had episode of shaking during that time. Hx of multiple falls. Pt reporting abdominal pain for X 1day, no nausea or vomiting, endorses loose stool on lactulose at home (21 Mar 2020 14:22)      SUBJECTIVE / OVERNIGHT EVENTS:          MEDICATIONS  (STANDING):  chlorhexidine 2% Cloths 1 Application(s) Topical <User Schedule>  dextrose 5%. 1000 milliLiter(s) (50 mL/Hr) IV Continuous <Continuous>  dextrose 50% Injectable 12.5 Gram(s) IV Push once  dextrose 50% Injectable 25 Gram(s) IV Push once  dextrose 50% Injectable 25 Gram(s) IV Push once  ertapenem  IVPB 1000 milliGRAM(s) IV Intermittent every 24 hours  folic acid 1 milliGRAM(s) Oral daily  insulin glargine Injectable (LANTUS) 13 Unit(s) SubCutaneous at bedtime  insulin lispro (HumaLOG) corrective regimen sliding scale   SubCutaneous three times a day before meals  insulin lispro (HumaLOG) corrective regimen sliding scale   SubCutaneous at bedtime  lactulose Syrup 30 Gram(s) Oral three times a day  midodrine. 30 milliGRAM(s) Oral every 8 hours  pantoprazole    Tablet 40 milliGRAM(s) Oral before breakfast  rifAXIMin 550 milliGRAM(s) Oral two times a day  tamsulosin 0.4 milliGRAM(s) Oral at bedtime    MEDICATIONS  (PRN):  dextrose 40% Gel 15 Gram(s) Oral once PRN Blood Glucose LESS THAN 70 milliGRAM(s)/deciLiter  glucagon  Injectable 1 milliGRAM(s) IntraMuscular once PRN Glucose <70 milliGRAM(s)/deciLiter      Vital Signs Last 24 Hrs  T(C): 36.7 (22 Mar 2020 04:45), Max: 36.7 (22 Mar 2020 04:45)  T(F): 98.1 (22 Mar 2020 04:45), Max: 98.1 (22 Mar 2020 04:45)  HR: 78 (22 Mar 2020 04:45) (72 - 78)  BP: 101/59 (22 Mar 2020 04:45) (98/57 - 121/67)  BP(mean): --  RR: 18 (22 Mar 2020 04:45) (15 - 18)  SpO2: 99% (22 Mar 2020 04:45) (99% - 100%)      PHYSICAL EXAM  GENERAL: NAD, well-developed  HEAD:  Atraumatic, Normocephalic  EYES: EOMI, PERRLA, conjunctiva and sclera clear  NECK: Supple, No JVD  CHEST/LUNG: Clear to auscultation bilaterally; No wheeze  HEART: Regular rate and rhythm; No murmurs, rubs, or gallops  ABDOMEN: Soft, Nontender, Nondistended; Bowel sounds present  EXTREMITIES:  2+ Peripheral Pulses, No clubbing, cyanosis, or edema  PSYCH: AAOx3  SKIN: No rashes or lesions    CAPILLARY BLOOD GLUCOSE      POCT Blood Glucose.: 200 mg/dL (21 Mar 2020 21:45)  POCT Blood Glucose.: 181 mg/dL (21 Mar 2020 17:33)  POCT Blood Glucose.: 127 mg/dL (21 Mar 2020 12:41)  POCT Blood Glucose.: 97 mg/dL (21 Mar 2020 08:46)    I&O's Summary    21 Mar 2020 07:01  -  22 Mar 2020 07:00  --------------------------------------------------------  IN: 240 mL / OUT: 2950 mL / NET: -2710 mL        LABS:                        7.3    4.90  )-----------( 57       ( 22 Mar 2020 05:25 )             21.6     03-22    136  |  105  |  22  ----------------------------<  93  4.5   |  22  |  1.00    Ca    9.1      22 Mar 2020 05:25  Phos  2.8     03-22  Mg     1.8     03-22    TPro  5.2<L>  /  Alb  2.6<L>  /  TBili  7.2<H>  /  DBili  x   /  AST  34  /  ALT  25  /  AlkPhos  137<H>  03-22    PT/INR - ( 21 Mar 2020 09:37 )   PT: 24.4 sec;   INR: 2.09 ratio                   RADIOLOGY & ADDITIONAL TESTS:     MICROBIOLOGY:    ANTIMICROBIALS:    CONSULTS: Patient is a 64y old  Male who presents with a chief complaint of Syncopal episode at home at 0430 this am, LOC for approx 3 minutes per wife, pt had episode of shaking during that time. Hx of multiple falls. Pt reporting abdominal pain for X 1day, no nausea or vomiting, endorses loose stool on lactulose at home (21 Mar 2020 14:22)      SUBJECTIVE / OVERNIGHT EVENTS:    No acute events overnight. Patient is stable, sleeping restfully, No new complaints. Patient is interested in going home soon if possible.       MEDICATIONS  (STANDING):  chlorhexidine 2% Cloths 1 Application(s) Topical <User Schedule>  dextrose 5%. 1000 milliLiter(s) (50 mL/Hr) IV Continuous <Continuous>  dextrose 50% Injectable 12.5 Gram(s) IV Push once  dextrose 50% Injectable 25 Gram(s) IV Push once  dextrose 50% Injectable 25 Gram(s) IV Push once  ertapenem  IVPB 1000 milliGRAM(s) IV Intermittent every 24 hours  folic acid 1 milliGRAM(s) Oral daily  insulin glargine Injectable (LANTUS) 13 Unit(s) SubCutaneous at bedtime  insulin lispro (HumaLOG) corrective regimen sliding scale   SubCutaneous three times a day before meals  insulin lispro (HumaLOG) corrective regimen sliding scale   SubCutaneous at bedtime  lactulose Syrup 30 Gram(s) Oral three times a day  midodrine. 30 milliGRAM(s) Oral every 8 hours  pantoprazole    Tablet 40 milliGRAM(s) Oral before breakfast  rifAXIMin 550 milliGRAM(s) Oral two times a day  tamsulosin 0.4 milliGRAM(s) Oral at bedtime    MEDICATIONS  (PRN):  dextrose 40% Gel 15 Gram(s) Oral once PRN Blood Glucose LESS THAN 70 milliGRAM(s)/deciLiter  glucagon  Injectable 1 milliGRAM(s) IntraMuscular once PRN Glucose <70 milliGRAM(s)/deciLiter      PHYSICAL EXAM:  Vital Signs Last 24 Hrs  T(C): 36.6 (21 Mar 2020 06:05), Max: 36.9 (20 Mar 2020 09:33)  T(F): 97.9 (21 Mar 2020 06:05), Max: 98.4 (20 Mar 2020 09:33)  HR: 74 (21 Mar 2020 06:05) (73 - 82)  BP: 110/63 (21 Mar 2020 06:05) (105/62 - 121/66)  BP(mean): --  RR: 16 (21 Mar 2020 06:05) (16 - 18)  SpO2: 99% (21 Mar 2020 06:05) (97% - 100%)    CONSTITUTIONAL: NAD  RESPIRATORY: Normal respiratory effort; lungs are clear to auscultation bilaterally  CARDIOVASCULAR: Regular rate and rhythm, normal S1 and S2, no murmur/rub/gallop  ABDOMEN: Large abd, nontender to palpation, Soft, + bowel sounds   MUSCLOSKELETAL: No LE edema   NEURO: Alert, good concentration       CAPILLARY BLOOD GLUCOSE      POCT Blood Glucose.: 200 mg/dL (21 Mar 2020 21:45)  POCT Blood Glucose.: 181 mg/dL (21 Mar 2020 17:33)  POCT Blood Glucose.: 127 mg/dL (21 Mar 2020 12:41)  POCT Blood Glucose.: 97 mg/dL (21 Mar 2020 08:46)    I&O's Summary    21 Mar 2020 07:01  -  22 Mar 2020 07:00  --------------------------------------------------------  IN: 240 mL / OUT: 2950 mL / NET: -2710 mL        LABS:                        7.3    4.90  )-----------( 57       ( 22 Mar 2020 05:25 )             21.6     03-22    136  |  105  |  22  ----------------------------<  93  4.5   |  22  |  1.00    Ca    9.1      22 Mar 2020 05:25  Phos  2.8     03-22  Mg     1.8     03-22    TPro  5.2<L>  /  Alb  2.6<L>  /  TBili  7.2<H>  /  DBili  x   /  AST  34  /  ALT  25  /  AlkPhos  137<H>  03-22    PT/INR - ( 21 Mar 2020 09:37 )   PT: 24.4 sec;   INR: 2.09 ratio

## 2020-03-23 LAB
ALBUMIN SERPL ELPH-MCNC: 2.7 G/DL — LOW (ref 3.3–5)
ALP SERPL-CCNC: 141 U/L — HIGH (ref 40–120)
ALT FLD-CCNC: 27 U/L — SIGNIFICANT CHANGE UP (ref 10–45)
ANION GAP SERPL CALC-SCNC: 9 MMOL/L — SIGNIFICANT CHANGE UP (ref 5–17)
AST SERPL-CCNC: 33 U/L — SIGNIFICANT CHANGE UP (ref 10–40)
BILIRUB SERPL-MCNC: 7.1 MG/DL — HIGH (ref 0.2–1.2)
BLD GP AB SCN SERPL QL: NEGATIVE — SIGNIFICANT CHANGE UP
BUN SERPL-MCNC: 24 MG/DL — HIGH (ref 7–23)
CALCIUM SERPL-MCNC: 9.2 MG/DL — SIGNIFICANT CHANGE UP (ref 8.4–10.5)
CHLORIDE SERPL-SCNC: 105 MMOL/L — SIGNIFICANT CHANGE UP (ref 96–108)
CO2 SERPL-SCNC: 22 MMOL/L — SIGNIFICANT CHANGE UP (ref 22–31)
CREAT SERPL-MCNC: 1.02 MG/DL — SIGNIFICANT CHANGE UP (ref 0.5–1.3)
GLUCOSE BLDC GLUCOMTR-MCNC: 120 MG/DL — HIGH (ref 70–99)
GLUCOSE BLDC GLUCOMTR-MCNC: 132 MG/DL — HIGH (ref 70–99)
GLUCOSE BLDC GLUCOMTR-MCNC: 189 MG/DL — HIGH (ref 70–99)
GLUCOSE BLDC GLUCOMTR-MCNC: 258 MG/DL — HIGH (ref 70–99)
GLUCOSE SERPL-MCNC: 138 MG/DL — HIGH (ref 70–99)
HCT VFR BLD CALC: 20.4 % — CRITICAL LOW (ref 39–50)
HCT VFR BLD CALC: 21.6 % — LOW (ref 39–50)
HCT VFR BLD CALC: 23.7 % — LOW (ref 39–50)
HGB BLD-MCNC: 6.8 G/DL — CRITICAL LOW (ref 13–17)
HGB BLD-MCNC: 7 G/DL — CRITICAL LOW (ref 13–17)
HGB BLD-MCNC: 7.6 G/DL — LOW (ref 13–17)
MAGNESIUM SERPL-MCNC: 1.6 MG/DL — SIGNIFICANT CHANGE UP (ref 1.6–2.6)
MCHC RBC-ENTMCNC: 31.7 PG — SIGNIFICANT CHANGE UP (ref 27–34)
MCHC RBC-ENTMCNC: 32.1 GM/DL — SIGNIFICANT CHANGE UP (ref 32–36)
MCHC RBC-ENTMCNC: 32.4 GM/DL — SIGNIFICANT CHANGE UP (ref 32–36)
MCHC RBC-ENTMCNC: 33 PG — SIGNIFICANT CHANGE UP (ref 27–34)
MCHC RBC-ENTMCNC: 33.3 GM/DL — SIGNIFICANT CHANGE UP (ref 32–36)
MCHC RBC-ENTMCNC: 33.8 PG — SIGNIFICANT CHANGE UP (ref 27–34)
MCV RBC AUTO: 101.5 FL — HIGH (ref 80–100)
MCV RBC AUTO: 101.9 FL — HIGH (ref 80–100)
MCV RBC AUTO: 98.8 FL — SIGNIFICANT CHANGE UP (ref 80–100)
NRBC # BLD: 0 /100 WBCS — SIGNIFICANT CHANGE UP (ref 0–0)
PHOSPHATE SERPL-MCNC: 2.8 MG/DL — SIGNIFICANT CHANGE UP (ref 2.5–4.5)
PLATELET # BLD AUTO: 59 K/UL — LOW (ref 150–400)
PLATELET # BLD AUTO: 60 K/UL — LOW (ref 150–400)
PLATELET # BLD AUTO: 61 K/UL — LOW (ref 150–400)
POTASSIUM SERPL-MCNC: 4.1 MMOL/L — SIGNIFICANT CHANGE UP (ref 3.5–5.3)
POTASSIUM SERPL-SCNC: 4.1 MMOL/L — SIGNIFICANT CHANGE UP (ref 3.5–5.3)
PROT SERPL-MCNC: 5.2 G/DL — LOW (ref 6–8.3)
RBC # BLD: 2.01 M/UL — LOW (ref 4.2–5.8)
RBC # BLD: 2.12 M/UL — LOW (ref 4.2–5.8)
RBC # BLD: 2.4 M/UL — LOW (ref 4.2–5.8)
RBC # FLD: 22 % — HIGH (ref 10.3–14.5)
RBC # FLD: 22.2 % — HIGH (ref 10.3–14.5)
RBC # FLD: 24.1 % — HIGH (ref 10.3–14.5)
RH IG SCN BLD-IMP: POSITIVE — SIGNIFICANT CHANGE UP
SODIUM SERPL-SCNC: 136 MMOL/L — SIGNIFICANT CHANGE UP (ref 135–145)
WBC # BLD: 4.2 K/UL — SIGNIFICANT CHANGE UP (ref 3.8–10.5)
WBC # BLD: 4.29 K/UL — SIGNIFICANT CHANGE UP (ref 3.8–10.5)
WBC # BLD: 4.33 K/UL — SIGNIFICANT CHANGE UP (ref 3.8–10.5)
WBC # FLD AUTO: 4.2 K/UL — SIGNIFICANT CHANGE UP (ref 3.8–10.5)
WBC # FLD AUTO: 4.29 K/UL — SIGNIFICANT CHANGE UP (ref 3.8–10.5)
WBC # FLD AUTO: 4.33 K/UL — SIGNIFICANT CHANGE UP (ref 3.8–10.5)

## 2020-03-23 PROCEDURE — 99233 SBSQ HOSP IP/OBS HIGH 50: CPT | Mod: GC

## 2020-03-23 PROCEDURE — 99232 SBSQ HOSP IP/OBS MODERATE 35: CPT

## 2020-03-23 RX ORDER — ERTAPENEM SODIUM 1 G/1
1 INJECTION, POWDER, LYOPHILIZED, FOR SOLUTION INTRAMUSCULAR; INTRAVENOUS
Qty: 19 | Refills: 0
Start: 2020-03-23 | End: 2020-04-10

## 2020-03-23 RX ORDER — MAGNESIUM SULFATE 500 MG/ML
1 VIAL (ML) INJECTION ONCE
Refills: 0 | Status: COMPLETED | OUTPATIENT
Start: 2020-03-23 | End: 2020-03-23

## 2020-03-23 RX ADMIN — Medication 1: at 17:19

## 2020-03-23 RX ADMIN — ERTAPENEM SODIUM 120 MILLIGRAM(S): 1 INJECTION, POWDER, LYOPHILIZED, FOR SOLUTION INTRAMUSCULAR; INTRAVENOUS at 15:55

## 2020-03-23 RX ADMIN — MIDODRINE HYDROCHLORIDE 30 MILLIGRAM(S): 2.5 TABLET ORAL at 22:04

## 2020-03-23 RX ADMIN — Medication 100 GRAM(S): at 10:00

## 2020-03-23 RX ADMIN — Medication 3 UNIT(S): at 08:37

## 2020-03-23 RX ADMIN — MIDODRINE HYDROCHLORIDE 30 MILLIGRAM(S): 2.5 TABLET ORAL at 15:55

## 2020-03-23 RX ADMIN — Medication 3 UNIT(S): at 12:32

## 2020-03-23 RX ADMIN — INSULIN GLARGINE 12 UNIT(S): 100 INJECTION, SOLUTION SUBCUTANEOUS at 22:04

## 2020-03-23 RX ADMIN — LACTULOSE 30 GRAM(S): 10 SOLUTION ORAL at 15:55

## 2020-03-23 RX ADMIN — LACTULOSE 30 GRAM(S): 10 SOLUTION ORAL at 05:54

## 2020-03-23 RX ADMIN — TAMSULOSIN HYDROCHLORIDE 0.4 MILLIGRAM(S): 0.4 CAPSULE ORAL at 22:05

## 2020-03-23 RX ADMIN — PANTOPRAZOLE SODIUM 40 MILLIGRAM(S): 20 TABLET, DELAYED RELEASE ORAL at 08:37

## 2020-03-23 RX ADMIN — Medication 1: at 22:04

## 2020-03-23 RX ADMIN — MIDODRINE HYDROCHLORIDE 30 MILLIGRAM(S): 2.5 TABLET ORAL at 05:53

## 2020-03-23 RX ADMIN — CHLORHEXIDINE GLUCONATE 1 APPLICATION(S): 213 SOLUTION TOPICAL at 05:54

## 2020-03-23 RX ADMIN — Medication 1 MILLIGRAM(S): at 12:32

## 2020-03-23 RX ADMIN — Medication 3 UNIT(S): at 17:20

## 2020-03-23 NOTE — CHART NOTE - NSCHARTNOTEFT_GEN_A_CORE
Reviewed pt's chart/labs/meds and discussed case with staff and primary team.   Per RN, pt tolerating POs with BG at goal today while on present insulin doses after pre meal Humalog was added.   No hypoglycemia. Per primary team pt stable and going home probably tomorrow.      MEDICATIONS:  ertapenem  IVPB 1000 milliGRAM(s) IV Intermittent every 24 hours  insulin glargine Injectable (LANTUS) 12 Unit(s) SubCutaneous at bedtime  insulin lispro (HumaLOG) corrective regimen sliding scale   SubCutaneous three times a day before meals  insulin lispro (HumaLOG) corrective regimen sliding scale   SubCutaneous at bedtime  insulin lispro Injectable (HumaLOG) 3 Unit(s) SubCutaneous three times a day before meals  lactulose Syrup 30 Gram(s) Oral three times a day      POCT Blood Glucose.: 132 mg/dL (03-23-20 @ 12:26)  POCT Blood Glucose.: 120 mg/dL (03-23-20 @ 08:34)  POCT Blood Glucose.: 227 mg/dL (03-22-20 @ 21:38)  POCT Blood Glucose.: 210 mg/dL (03-22-20 @ 17:40)  POCT Blood Glucose.: 171 mg/dL (03-22-20 @ 12:21)  POCT Blood Glucose.: 101 mg/dL (03-22-20 @ 08:31)  POCT Blood Glucose.: 200 mg/dL (03-21-20 @ 21:45)  POCT Blood Glucose.: 181 mg/dL (03-21-20 @ 17:33)    Corticosteroid Binding Globulin Result: <0.5 mg/dL (03-15-20 @ 11:22)    Plan  DM  -test BG AC/HS  -C/w Lantus 12 units QHS  -c/w Humalog low correction scale AC and Low HS scale  -c/w Humalog 3 units AC meeals  -Discharge on basal/bolus as noted above. Pt taking higher insulin doses at home but should increase doses only if BG>200s  -f/u outpt w/Dr Goldberg    Adrenal Insufficiency:  -corticosteroid binding globulin low. Discussed with endo attneding DR Goldstein. Low cortisol level due to low CBG secondary to JEAN cirrhosis.   -Pt to f/u up as outpt w/Dr Goldberg    Due to [facility] policy during the evolving novel coronavirus outbreak, efforts are being made to limit unnecessary patient contacts to limit the spread of disease.   Accordingly, patients without clear indication for physical exam or face to face interview from the Endocrine team will be adjusted in conjunction with conversations with primary provider team. This is being done for the safety of all the patients we care for.     For any questions please contact team:  Cell: [phone] today 3/23/20  Office: [phone].

## 2020-03-23 NOTE — PROGRESS NOTE ADULT - ASSESSMENT
64M with DM2,  dyslipidemia, obesity, BPH, Orthostatic hypotension, GERD, HTN, Neuropathy, Obesity, HFpEF with mild LV diastolic dysfunction, and decompensated JEAN cirrhosis complicated by ascites, history of SBP, hepatic encephalopathy, and chronic anemia with a history of duodenal ulcer as well as GAVE and duodenal AVM s/p APC (last on 10/11/19), who is UNOS listed for liver transplantation at [facility] who presents with fall at home, general weakness and ? syncope in setting of UTI/abscess.  He is s/p TURP, and now gross hematuria.  Had AMS and hypotension, likely a component of both septic and hypovolemic shock.  s/p mm blood products, and not on IV pressor support    - Continue to trend H/H and transfuse as needed  - pressors off, but is very high dose midodrine    - Hold diuretics as not vol ol  - Avoid QTC prolonging drugs  - Monitor and replete electrolytes. Keep K>4.0 and Mg>2.0. Replete Mg today  - cont abx  - No signs of significant ischemia. Cath with non obs disease  - echo in 2/2020 with normal LV function. No need to repeat.    - Further cardiac workup will depend on clinical course.   - All other workup per primary team. Will followup.

## 2020-03-23 NOTE — PROGRESS NOTE ADULT - PROBLEM SELECTOR PLAN 9
- FSG QAC/ HS and ISS  - Lantus 13 units QHS  - Premeal humalog 3u TID restarted 3/22  -A1C = 6.4 on 2/2020  -Discharge on basal/bolus  -f/u outpt w/Dr Goldberg - FSG QAC/ HS and ISS  - Lantus 12 units QHS  - Premeal humalog 3u TID restarted 3/22  -A1C = 6.4 on 2/2020  -Endocrinology following. Discharge on basal/bolus, f/u outpt w/Dr Goldberg

## 2020-03-23 NOTE — PROGRESS NOTE ADULT - SUBJECTIVE AND OBJECTIVE BOX
Chief Complaint:  Patient is a 64y old  Male who presents with a chief complaint of Syncopal episode at home at 0430 this am, LOC for approx 3 minutes per wife, pt had episode of shaking during that time. Hx of multiple falls. Pt reporting abdominal pain for X 1day, no nausea or vomiting, endorses loose stool on lactulose at home (23 Mar 2020 08:22)      Interval Events:   No events overnight    Allergies:  codeine (Anaphylaxis)      Home Medications:    Hospital Medications:  chlorhexidine 2% Cloths 1 Application(s) Topical <User Schedule>  dextrose 40% Gel 15 Gram(s) Oral once PRN  dextrose 5%. 1000 milliLiter(s) IV Continuous <Continuous>  dextrose 50% Injectable 12.5 Gram(s) IV Push once  dextrose 50% Injectable 25 Gram(s) IV Push once  dextrose 50% Injectable 25 Gram(s) IV Push once  ertapenem  IVPB 1000 milliGRAM(s) IV Intermittent every 24 hours  folic acid 1 milliGRAM(s) Oral daily  glucagon  Injectable 1 milliGRAM(s) IntraMuscular once PRN  insulin glargine Injectable (LANTUS) 12 Unit(s) SubCutaneous at bedtime  insulin lispro (HumaLOG) corrective regimen sliding scale   SubCutaneous three times a day before meals  insulin lispro (HumaLOG) corrective regimen sliding scale   SubCutaneous at bedtime  insulin lispro Injectable (HumaLOG) 3 Unit(s) SubCutaneous three times a day before meals  lactulose Syrup 30 Gram(s) Oral three times a day  midodrine. 30 milliGRAM(s) Oral every 8 hours  pantoprazole    Tablet 40 milliGRAM(s) Oral before breakfast  rifAXIMin 550 milliGRAM(s) Oral two times a day  tamsulosin 0.4 milliGRAM(s) Oral at bedtime      PMHX/PSHX:  GIB (gastrointestinal bleeding)  GERD with esophagitis  Hepatic encephalopathy  Obesity  Fatty liver disease, nonalcoholic  Renal stones  Hypertension  Neuropathy  Hypercholesteremia  Diabetes  S/P cholecystectomy  No significant past surgical history      Family history:  Family history of type 2 diabetes mellitus  Family history of hypertension  Family history of stomach cancer  No pertinent family history in first degree relatives      ROS:     General:  No wt loss, fevers, chills, night sweats, fatigue,   Eyes:  Good vision, no reported pain  ENT:  No sore throat, pain, runny nose, dysphagia  CV:  No pain, palpitations, hypo/hypertension  Resp:  No dyspnea, cough, tachypnea, wheezing  GI:  No pain, No nausea, No vomiting, No diarrhea, No constipation, No weight loss, No fever, No pruritis, No rectal bleeding, No tarry stools, No dysphagia,  :  No pain, bleeding, incontinence, nocturia  Muscle:  No pain, weakness  Neuro:  No weakness, tingling, memory problems  Psych:  No fatigue, insomnia, mood problems, depression  Endocrine:  No polyuria, polydipsia, cold/heat intolerance  Heme:  No petechiae, ecchymosis, easy bruisability  Skin:  No rash, tattoos, scars, edema      PHYSICAL EXAM:   Vital Signs:  Vital Signs Last 24 Hrs  T(C): 36.9 (23 Mar 2020 05:15), Max: 36.9 (23 Mar 2020 05:15)  T(F): 98.4 (23 Mar 2020 05:15), Max: 98.4 (23 Mar 2020 05:15)  HR: 87 (23 Mar 2020 05:15) (81 - 87)  BP: 102/58 (23 Mar 2020 05:15) (102/56 - 123/69)  BP(mean): --  RR: 18 (23 Mar 2020 05:15) (15 - 18)  SpO2: 99% (23 Mar 2020 05:15) (98% - 100%)  Daily     Daily     GENERAL:  Appears stated age, well-groomed, well-nourished, no distress  HEENT:  NC/AT,  conjunctivae clear and pink, no thyromegaly, nodules, adenopathy, no JVD, sclera -anicteric  CHEST:  Full & symmetric excursion, no increased effort, breath sounds clear  HEART:  Regular rhythm, S1, S2, no murmur/rub/S3/S4, no abdominal bruit, no edema  ABDOMEN:  Soft, non-tender, non-distended, normoactive bowel sounds,  no masses ,no hepato-splenomegaly, no signs of chronic liver disease  EXTEREMITIES:  no cyanosis,clubbing or edema  SKIN:  No rash/erythema/ecchymoses/petechiae/wounds/abscess/warm/dry  NEURO:  Alert, oriented, no asterixis, no tremor, no encephalopathy    LABS:                        7.0    4.33  )-----------( 60       ( 23 Mar 2020 08:12 )             21.6     03-23    136  |  105  |  24<H>  ----------------------------<  138<H>  4.1   |  22  |  1.02    Ca    9.2      23 Mar 2020 06:36  Phos  2.8     03-23  Mg     1.6     03-23    TPro  5.2<L>  /  Alb  2.7<L>  /  TBili  7.1<H>  /  DBili  x   /  AST  33  /  ALT  27  /  AlkPhos  141<H>  03-23    LIVER FUNCTIONS - ( 23 Mar 2020 06:36 )  Alb: 2.7 g/dL / Pro: 5.2 g/dL / ALK PHOS: 141 U/L / ALT: 27 U/L / AST: 33 U/L / GGT: x           PT/INR - ( 22 Mar 2020 09:48 )   PT: 25.2 sec;   INR: 2.14 ratio         PTT - ( 22 Mar 2020 09:48 )  PTT:50.7 sec        Imaging: Chief Complaint:  Patient is a 64y old  Male who presents with a chief complaint of Syncopal episode at home at 0430 this am, LOC for approx 3 minutes per wife, pt had episode of shaking during that time. Hx of multiple falls. Pt reporting abdominal pain for X 1day, no nausea or vomiting, endorses loose stool on lactulose at home (23 Mar 2020 08:22)      Interval Events:   No events overnight  Feels well this morning    Allergies:  codeine (Anaphylaxis)      Home Medications:    Hospital Medications:  chlorhexidine 2% Cloths 1 Application(s) Topical <User Schedule>  dextrose 40% Gel 15 Gram(s) Oral once PRN  dextrose 5%. 1000 milliLiter(s) IV Continuous <Continuous>  dextrose 50% Injectable 12.5 Gram(s) IV Push once  dextrose 50% Injectable 25 Gram(s) IV Push once  dextrose 50% Injectable 25 Gram(s) IV Push once  ertapenem  IVPB 1000 milliGRAM(s) IV Intermittent every 24 hours  folic acid 1 milliGRAM(s) Oral daily  glucagon  Injectable 1 milliGRAM(s) IntraMuscular once PRN  insulin glargine Injectable (LANTUS) 12 Unit(s) SubCutaneous at bedtime  insulin lispro (HumaLOG) corrective regimen sliding scale   SubCutaneous three times a day before meals  insulin lispro (HumaLOG) corrective regimen sliding scale   SubCutaneous at bedtime  insulin lispro Injectable (HumaLOG) 3 Unit(s) SubCutaneous three times a day before meals  lactulose Syrup 30 Gram(s) Oral three times a day  midodrine. 30 milliGRAM(s) Oral every 8 hours  pantoprazole    Tablet 40 milliGRAM(s) Oral before breakfast  rifAXIMin 550 milliGRAM(s) Oral two times a day  tamsulosin 0.4 milliGRAM(s) Oral at bedtime      PMHX/PSHX:  GIB (gastrointestinal bleeding)  GERD with esophagitis  Hepatic encephalopathy  Obesity  Fatty liver disease, nonalcoholic  Renal stones  Hypertension  Neuropathy  Hypercholesteremia  Diabetes  S/P cholecystectomy  No significant past surgical history      Family history:  Family history of type 2 diabetes mellitus  Family history of hypertension  Family history of stomach cancer  No pertinent family history in first degree relatives      ROS:     General:  No wt loss, fevers, chills, night sweats, fatigue,   Eyes:  Good vision, no reported pain  ENT:  No sore throat, pain, runny nose, dysphagia  CV:  No pain, palpitations, hypo/hypertension  Resp:  No dyspnea, cough, tachypnea, wheezing  GI:  No pain, No nausea, No vomiting, No diarrhea, No constipation, No weight loss, No fever, No pruritis, No rectal bleeding, No tarry stools, No dysphagia,  :  No pain, bleeding, incontinence, nocturia  Muscle:  No pain, weakness  Neuro:  No weakness, tingling, memory problems  Psych:  No fatigue, insomnia, mood problems, depression  Endocrine:  No polyuria, polydipsia, cold/heat intolerance  Heme:  No petechiae, ecchymosis, easy bruisability  Skin:  No rash, tattoos, scars, edema      PHYSICAL EXAM:   Vital Signs:  Vital Signs Last 24 Hrs  T(C): 36.9 (23 Mar 2020 05:15), Max: 36.9 (23 Mar 2020 05:15)  T(F): 98.4 (23 Mar 2020 05:15), Max: 98.4 (23 Mar 2020 05:15)  HR: 87 (23 Mar 2020 05:15) (81 - 87)  BP: 102/58 (23 Mar 2020 05:15) (102/56 - 123/69)  BP(mean): --  RR: 18 (23 Mar 2020 05:15) (15 - 18)  SpO2: 99% (23 Mar 2020 05:15) (98% - 100%)  Daily     Daily     GENERAL:  Appears stated age, well-groomed, well-nourished, no distress  HEENT:  NC/AT,  conjunctivae clear and pink, no thyromegaly, nodules, adenopathy, no JVD, sclera -anicteric  CHEST:  Full & symmetric excursion, no increased effort, breath sounds clear  HEART:  Regular rhythm, S1, S2, no murmur/rub/S3/S4, no abdominal bruit, no edema  ABDOMEN:  Soft, non-tender, non-distended, normoactive bowel sounds,  no masses ,no hepato-splenomegaly, no signs of chronic liver disease  EXTEREMITIES:  no cyanosis,clubbing or edema  SKIN:  No rash/erythema/ecchymoses/petechiae/wounds/abscess/warm/dry  NEURO:  Alert, oriented, no asterixis, no tremor, no encephalopathy    LABS:                        7.0    4.33  )-----------( 60       ( 23 Mar 2020 08:12 )             21.6     03-23    136  |  105  |  24<H>  ----------------------------<  138<H>  4.1   |  22  |  1.02    Ca    9.2      23 Mar 2020 06:36  Phos  2.8     03-23  Mg     1.6     03-23    TPro  5.2<L>  /  Alb  2.7<L>  /  TBili  7.1<H>  /  DBili  x   /  AST  33  /  ALT  27  /  AlkPhos  141<H>  03-23    LIVER FUNCTIONS - ( 23 Mar 2020 06:36 )  Alb: 2.7 g/dL / Pro: 5.2 g/dL / ALK PHOS: 141 U/L / ALT: 27 U/L / AST: 33 U/L / GGT: x           PT/INR - ( 22 Mar 2020 09:48 )   PT: 25.2 sec;   INR: 2.14 ratio         PTT - ( 22 Mar 2020 09:48 )  PTT:50.7 sec        Imaging:

## 2020-03-23 NOTE — CHART NOTE - NSCHARTNOTEFT_GEN_A_CORE
64M with cirrhosis  Prostate abscess  s/p drainage 3/15   plan for 4 weeks of ertapenem from drainage  ok to place midline     At this time there is NO ID Contraindication for reactivating patient on liver transplant list.     Recommend CBC with Diff and CMP qWeekly while on antimicrobials. Please have results faxed to [phone]    Patient was advised to follow up with me in the Infectious Diseases Office in 3-4 weeks time. Office contact number of [phone]    I will continue to follow. Please feel free to contact me with any further questions.    Eusebio Pérez M.D.  [facility] Division of Infectious Disease  8AM-5PM: Pager Number [phone]  After Hours (or if no response): Please contact the Infectious Diseases Office at [phone]

## 2020-03-23 NOTE — CHART NOTE - NSCHARTNOTEFT_GEN_A_CORE
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 syncope, admitted 3/10. Chart reviewed, events noted.     Source: comprehensive chart review    Diet: consistent carbohydrate (evening snack)      Patient sleeping at time of visit x multiple attempts. Spoke with RN. Per RN, pt with good PO intake, consuming >50% of meals. Denies pt with GI distress. Last BM 3/19 as per flowsheets.     PO intake: %    Source for PO intake: staff (RN)    Enteral /Parenteral Nutrition: N/A    Most Recent Weight: 259.7 pounds (3/19)  % Weight Change: noted pt with weight fluctuations throughout admission, likely related to fluid shifts vs scale differences, will continue to monitor; of note, most recent weight indicates weight gain since admission wt 253.9 pounds (3/10)    Pertinent Medications: MEDICATIONS  (STANDING):  chlorhexidine 2% Cloths 1 Application(s) Topical <User Schedule>  dextrose 5%. 1000 milliLiter(s) (50 mL/Hr) IV Continuous <Continuous>  dextrose 50% Injectable 12.5 Gram(s) IV Push once  dextrose 50% Injectable 25 Gram(s) IV Push once  dextrose 50% Injectable 25 Gram(s) IV Push once  folic acid 1 milliGRAM(s) Oral daily  insulin glargine Injectable (LANTUS) 12 Unit(s) SubCutaneous at bedtime  insulin lispro (HumaLOG) corrective regimen sliding scale   SubCutaneous three times a day before meals  insulin lispro (HumaLOG) corrective regimen sliding scale   SubCutaneous at bedtime  insulin lispro Injectable (HumaLOG) 3 Unit(s) SubCutaneous three times a day before meals  lactulose Syrup 30 Gram(s) Oral three times a day  midodrine. 30 milliGRAM(s) Oral every 8 hours  pantoprazole    Tablet 40 milliGRAM(s) Oral before breakfast  rifAXIMin 550 milliGRAM(s) Oral two times a day  tamsulosin 0.4 milliGRAM(s) Oral at bedtime    MEDICATIONS  (PRN):  dextrose 40% Gel 15 Gram(s) Oral once PRN Blood Glucose LESS THAN 70 milliGRAM(s)/deciLiter  glucagon  Injectable 1 milliGRAM(s) IntraMuscular once PRN Glucose <70 milliGRAM(s)/deciLiter    Pertinent Labs:  03-23 Na136 mmol/L Glu 138 mg/dL<H> K+ 4.1 mmol/L Cr  1.02 mg/dL BUN 24 mg/dL<H> 03-23 Phos 2.8 mg/dL 03-23 Alb 2.7 g/dL<L>  Glucose fingersticks: (3/23) 120-132, (3/22) 101-227, (3/21)     Skin: +surgical incision, no pressure injuries per flowsheets   Edema: no edema per flowsheets      Estimated Needs: no change since previous assessment, based on  pounds, defer fluid needs to team at this time  Estimated Energy Needs (25-30 calories/kg): 3328-6746 calories/day  Estimated Protein Needs (1.2-1.4 gm/kg): 100-117 gm/day    Previous Nutrition Diagnosis: Increased nutrient needs   Nutrition Diagnosis is ongoing - pt currently with good PO intake, continue to provide encouragement at mealtimes as feasible     New Nutrition Diagnosis: not applicable    Recommend  1) Continue current diet - monitor/adjust as needed   2) Monitor need for oral nutrition supplement   3) Continue to provide encouragement and assistance at mealtimes as needed/feasible    Monitoring and Evaluation: Monitor PO intake, weight, labs, skin, GI status, diet     RD remains available upon request and will follow up per protocol.   Ju Chester, MS, RD, CDN Pager #[phone]

## 2020-03-23 NOTE — PROGRESS NOTE ADULT - SUBJECTIVE AND OBJECTIVE BOX
[facility] - Sushila Dhaliwal, Doug, Morenita, Ayo, Colleen Williamson  Office Number:  [phone]    Patient resting comfortably in bed in NAD.  Laying flat with no respiratory distress.  No complaints of chest pain, dyspnea, palpitations, PND, or orthopnea.    F/U for:  Hypotension      MEDICATIONS  (STANDING):  chlorhexidine 2% Cloths 1 Application(s) Topical <User Schedule>  dextrose 5%. 1000 milliLiter(s) (50 mL/Hr) IV Continuous <Continuous>  dextrose 50% Injectable 12.5 Gram(s) IV Push once  dextrose 50% Injectable 25 Gram(s) IV Push once  dextrose 50% Injectable 25 Gram(s) IV Push once  ertapenem  IVPB 1000 milliGRAM(s) IV Intermittent every 24 hours  folic acid 1 milliGRAM(s) Oral daily  insulin glargine Injectable (LANTUS) 12 Unit(s) SubCutaneous at bedtime  insulin lispro (HumaLOG) corrective regimen sliding scale   SubCutaneous three times a day before meals  insulin lispro (HumaLOG) corrective regimen sliding scale   SubCutaneous at bedtime  insulin lispro Injectable (HumaLOG) 3 Unit(s) SubCutaneous three times a day before meals  lactulose Syrup 30 Gram(s) Oral three times a day  midodrine. 30 milliGRAM(s) Oral every 8 hours  pantoprazole    Tablet 40 milliGRAM(s) Oral before breakfast  rifAXIMin 550 milliGRAM(s) Oral two times a day  tamsulosin 0.4 milliGRAM(s) Oral at bedtime    MEDICATIONS  (PRN):  dextrose 40% Gel 15 Gram(s) Oral once PRN Blood Glucose LESS THAN 70 milliGRAM(s)/deciLiter  glucagon  Injectable 1 milliGRAM(s) IntraMuscular once PRN Glucose <70 milliGRAM(s)/deciLiter      Allergies    codeine (Anaphylaxis)    Intolerances        Vital Signs Last 24 Hrs  T(C): 36.9 (23 Mar 2020 05:15), Max: 36.9 (23 Mar 2020 05:15)  T(F): 98.4 (23 Mar 2020 05:15), Max: 98.4 (23 Mar 2020 05:15)  HR: 87 (23 Mar 2020 05:15) (81 - 87)  BP: 102/58 (23 Mar 2020 05:15) (102/56 - 123/69)  BP(mean): --  RR: 18 (23 Mar 2020 05:15) (15 - 18)  SpO2: 99% (23 Mar 2020 05:15) (98% - 100%)    I&O's Summary    22 Mar 2020 07:01  -  23 Mar 2020 07:00  --------------------------------------------------------  IN: 120 mL / OUT: 1350 mL / NET: -1230 mL        ON EXAM:    Constitutional: NAD, awake and alert  Eyes:   Pupils round, no lesions  ENMT: no exudate or erythema  Pulmonary:  breath sounds are clear bilaterally, No wheezing, rales or rhonchi  Cardiovascular: PMI not palpable RRR normal S1 and S2, no murmurs, rubs, gallops or clicks  Gastrointestinal: Bowel Sounds present, soft, nontender.   Lymph: No cervical lymphadenopathy.  Neurological: no focal deficits  Skin: No rashes.  No cyanosis.  Psych:  Mood & affect appropriate   Ext: trc lower ext edema  LABS: All Labs Reviewed:                        6.8    4.20  )-----------( 59       ( 23 Mar 2020 06:36 )             20.4                         7.3    4.90  )-----------( 57       ( 22 Mar 2020 05:25 )             21.6                         7.3    6.08  )-----------( 56       ( 21 Mar 2020 08:48 )             22.5     23 Mar 2020 06:36    136    |  105    |  24     ----------------------------<  138    4.1     |  22     |  1.02   22 Mar 2020 05:25    136    |  105    |  22     ----------------------------<  93     4.5     |  22     |  1.00   21 Mar 2020 06:42    135    |  103    |  23     ----------------------------<  98     4.7     |  23     |  1.05     Ca    9.2        23 Mar 2020 06:36  Ca    9.1        22 Mar 2020 05:25  Ca    9.7        21 Mar 2020 06:42  Phos  2.8       23 Mar 2020 06:36  Phos  2.8       22 Mar 2020 05:25  Phos  2.7       21 Mar 2020 06:42  Mg     1.6       23 Mar 2020 06:36  Mg     1.8       22 Mar 2020 05:25  Mg     1.9       21 Mar 2020 06:42    TPro  5.2    /  Alb  2.7    /  TBili  7.1    /  DBili  x      /  AST  33     /  ALT  27     /  AlkPhos  141    23 Mar 2020 06:36  TPro  5.2    /  Alb  2.6    /  TBili  7.2    /  DBili  x      /  AST  34     /  ALT  25     /  AlkPhos  137    22 Mar 2020 05:25  TPro  5.6    /  Alb  2.9    /  TBili  7.4    /  DBili  x      /  AST  39     /  ALT  30     /  AlkPhos  148    21 Mar 2020 06:42    PT/INR - ( 22 Mar 2020 09:48 )   PT: 25.2 sec;   INR: 2.14 ratio         PTT - ( 22 Mar 2020 09:48 )  PTT:50.7 sec

## 2020-03-23 NOTE — PROGRESS NOTE ADULT - ASSESSMENT
64 M hx of DM, HLD, GERD, HFpEF with mild LV diastolic dysfunction, and decompensated JEAN cirrhosis c/b ascites with hx of SBP, HE, duodenal ulcer, GAVE and duodenal AVM s/p APC and recent admission (2/2020) for septic shock 2/2 to emphysematous cystitis with E. coli bacteremia  presents for second admission for fall in one month in the setting of possible vasovagal episode with evidence of urosepsis.    Impression:  # Decompensated JEAN Cirrhosis: UNOS listed for liver transplant. MELD-Na 23 (3/23)  -Ascites: Trace on CT abdomen/pelvis from 03/11/20. Hx of SBP  -Varices: Small non-bleeding EV on EGD from 12/2019.  -HCC: No lesions on CT with con from 3/12/20  -PSE: Currently AOx3, on Lactulose + Rifaximin  # Recurrent Urosepsis with ESBL prostatic abscess s/p OR for I&D 3/15/20. Currently on Ertapenem: Now Afebrile with resolved leukocytosis.   Emphesematous cystitis seen on during prior hospitalization with hx of ESBL Ecoli. CT scan during this admission showing enlarged prostate concerning for abscess. Questionable fistula on CT  # Recurrent falls: Differential includes deconditioning vs. diabetic neuropathy.     Recommendations:  - Continue IV Abx  - Will need to resume SBP PPx once current regimen is completed  - ID and urology following, f/u recommendations  - Hold diuretics for now  - Continue with midodrine 30mg TID  - Continue Lactulose and Rifaximin   - Supportive care per primary team  - discharge patient as per primary team 64 M hx of DM, HLD, GERD, HFpEF with mild LV diastolic dysfunction, and decompensated JEAN cirrhosis c/b ascites with hx of SBP, HE, duodenal ulcer, GAVE and duodenal AVM s/p APC and recent admission (2/2020) for septic shock 2/2 to emphysematous cystitis with E. coli bacteremia  presents for second admission for fall in one month in the setting of possible vasovagal episode with evidence of urosepsis.    Impression:  # Decompensated JEAN Cirrhosis: UNOS listed for liver transplant. MELD-Na 23 (3/23)  -Ascites: Trace on CT abdomen/pelvis from 03/11/20. Hx of SBP  -Varices: Small non-bleeding EV on EGD from 12/2019.  -HCC: No lesions on CT with con from 3/12/20  -PSE: Currently AOx3, on Lactulose + Rifaximin  # Recurrent Urosepsis with ESBL prostatic abscess s/p OR for I&D 3/15/20. Currently on Ertapenem: Now Afebrile with resolved leukocytosis.   Emphesematous cystitis seen on during prior hospitalization with hx of ESBL Ecoli. CT scan during this admission showing enlarged prostate concerning for abscess. Questionable fistula on CT  # Recurrent falls: Differential includes deconditioning vs. diabetic neuropathy.     Recommendations:  - give 1 u PRBC today  - Continue IV Abx (plan for 4 week course)  - Will need to resume SBP PPx once current regimen is completed  - ID and urology following, f/u recommendations  - Hold diuretics for now  - Continue with midodrine 30mg TID  - Continue Lactulose and Rifaximin   - Supportive care per primary team  - discharge patient as per primary team

## 2020-03-23 NOTE — PROVIDER CONTACT NOTE (CRITICAL VALUE NOTIFICATION) - BACKGROUND
admitted s/p fall
Patient admitted for pancreatic abscess
Patient admitted for pancreatic abscess
Patient came in s/p fall with a diagnosis of UTI.

## 2020-03-23 NOTE — PROGRESS NOTE ADULT - ATTENDING COMMENTS
Hepatology Staff: Yomi Medina MD    I saw and examined the patient along with  Dr. Annie Motta 03-23-20.    Patient Medical Record, hospital course was reviewed and summarized as below:    Vitals: Vital Signs Last 24 Hrs  T(C): 37.1 (23 Mar 2020 08:38), Max: 37.1 (23 Mar 2020 08:38)  T(F): 98.8 (23 Mar 2020 08:38), Max: 98.8 (23 Mar 2020 08:38)  HR: 64 (23 Mar 2020 08:38) (64 - 87)  BP: 114/66 (23 Mar 2020 08:38) (102/56 - 123/69)  RR: 18 (23 Mar 2020 08:38) (15 - 18)  SpO2: 98% (23 Mar 2020 08:38) (98% - 100%)    Labs:Creatinine, Serum: 1.02 mg/dL (03-23-20 @ 06:36)  Bilirubin Total, Serum: 7.1 mg/dL (03-23-20 @ 06:36)    I/O: I&O's Summary    22 Mar 2020 07:01  -  23 Mar 2020 07:00  --------------------------------------------------------  IN: 120 mL / OUT: 1350 mL / NET: -1230 mL      Nutritional Status:   Albumin, Serum: 2.7 g/dL (03-23-20 @ 06:36)    Last 24 hour events: Stable with improved MELD score ( 23 now). Pt listed with MELD score of 26.    Recommendations: Discussed with ID (Dr. Eusebio Castro). Plan is to place a mid line, and potentially discharge patient home with IV antibiotics x 4 weeks (Ertapenem). Hb is 7 g/dl. Stable. No overt bleeding. Would recommend 1 unit PRBC today.      Plan discussed with Primary team.

## 2020-03-23 NOTE — PROVIDER CONTACT NOTE (CRITICAL VALUE NOTIFICATION) - ASSESSMENT
afebrile, orthostatic
Patient a&ox4, denies pain at this time
Patient a&ox4, denies pain at this time, blood tinged urine In [place]
Patient is A/o x 4. Vital signs stable.

## 2020-03-23 NOTE — PROGRESS NOTE ADULT - ASSESSMENT
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 syncope 2/2 sepsis from UTI w/ prostatic abscess. 64M with DM2, dyslipidemia, obesity, BPH, Orthostatic hypotension, GERD, HTN, Neuropathy, Obesity, HFpEF with mild LV diastolic dysfunction, and decompensated JEAN cirrhosis complicated by ascites, history of SBP, hepatic encephalopathy, and chronic anemia with a history of duodenal ulcer as well as GAVE and duodenal AVM s/p APC (last on 10/11/19), who is UNOS listed for liver transplantation at [facility] who presents with fall at home, general weakness and ? syncope 2/2 sepsis from UTI w/ prostatic abscess. Now s/p TURP with drainage & bob placement, s/p midline placement.

## 2020-03-23 NOTE — PROGRESS NOTE ADULT - PROBLEM SELECTOR PLAN 2
- Endo following, recs appreciated   - Low AM cortisol level   - Per Endo: Clinically no evidence of adrenal insufficiency. Stim test (3/22) results c/w unlikely adrenal insufficiency. Low cortisol level in this case most likely due to low CBG from cirrhosis. further w/u as outpatient.

## 2020-03-23 NOTE — PROGRESS NOTE ADULT - SUBJECTIVE AND OBJECTIVE BOX
PROGRESS NOTE:     Patient is a 64y old  Male who presents with a chief complaint of Syncopal episode at home at 0430 this am, LOC for approx 3 minutes per wife, pt had episode of shaking during that time. Hx of multiple falls. Pt reporting abdominal pain for X 1day, no nausea or vomiting, endorses loose stool on lactulose at home (22 Mar 2020 13:34)      SUBJECTIVE / OVERNIGHT EVENTS:  -No acute overnight events.    ADDITIONAL REVIEW OF SYSTEMS:    MEDICATIONS  (STANDING):  chlorhexidine 2% Cloths 1 Application(s) Topical <User Schedule>  dextrose 5%. 1000 milliLiter(s) (50 mL/Hr) IV Continuous <Continuous>  dextrose 50% Injectable 12.5 Gram(s) IV Push once  dextrose 50% Injectable 25 Gram(s) IV Push once  dextrose 50% Injectable 25 Gram(s) IV Push once  ertapenem  IVPB 1000 milliGRAM(s) IV Intermittent every 24 hours  folic acid 1 milliGRAM(s) Oral daily  insulin glargine Injectable (LANTUS) 12 Unit(s) SubCutaneous at bedtime  insulin lispro (HumaLOG) corrective regimen sliding scale   SubCutaneous three times a day before meals  insulin lispro (HumaLOG) corrective regimen sliding scale   SubCutaneous at bedtime  insulin lispro Injectable (HumaLOG) 3 Unit(s) SubCutaneous three times a day before meals  lactulose Syrup 30 Gram(s) Oral three times a day  midodrine. 30 milliGRAM(s) Oral every 8 hours  pantoprazole    Tablet 40 milliGRAM(s) Oral before breakfast  rifAXIMin 550 milliGRAM(s) Oral two times a day  tamsulosin 0.4 milliGRAM(s) Oral at bedtime    MEDICATIONS  (PRN):  dextrose 40% Gel 15 Gram(s) Oral once PRN Blood Glucose LESS THAN 70 milliGRAM(s)/deciLiter  glucagon  Injectable 1 milliGRAM(s) IntraMuscular once PRN Glucose <70 milliGRAM(s)/deciLiter      CAPILLARY BLOOD GLUCOSE      POCT Blood Glucose.: 227 mg/dL (22 Mar 2020 21:38)  POCT Blood Glucose.: 210 mg/dL (22 Mar 2020 17:40)  POCT Blood Glucose.: 171 mg/dL (22 Mar 2020 12:21)  POCT Blood Glucose.: 101 mg/dL (22 Mar 2020 08:31)    I&O's Summary    22 Mar 2020 07:01  -  23 Mar 2020 07:00  --------------------------------------------------------  IN: 120 mL / OUT: 1350 mL / NET: -1230 mL        PHYSICAL EXAM:  Vital Signs Last 24 Hrs  T(C): 36.9 (23 Mar 2020 05:15), Max: 36.9 (23 Mar 2020 05:15)  T(F): 98.4 (23 Mar 2020 05:15), Max: 98.4 (23 Mar 2020 05:15)  HR: 87 (23 Mar 2020 05:15) (80 - 87)  BP: 102/58 (23 Mar 2020 05:15) (102/56 - 123/69)  BP(mean): --  RR: 18 (23 Mar 2020 05:15) (15 - 18)  SpO2: 99% (23 Mar 2020 05:15) (98% - 100%)    CONSTITUTIONAL: NAD  RESPIRATORY: Normal respiratory effort; lungs are clear to auscultation bilaterally  CARDIOVASCULAR: Regular rate and rhythm, normal S1 and S2, no murmur/rub/gallop  ABDOMEN: Large abd, nontender to palpation, Soft, + bowel sounds   MUSCLOSKELETAL: No LE edema   NEURO: Alert, good concentration     LABS:                        7.3    4.90  )-----------( 57       ( 22 Mar 2020 05:25 )             21.6     03-23    136  |  105  |  24<H>  ----------------------------<  138<H>  4.1   |  22  |  1.02    Ca    9.2      23 Mar 2020 06:36  Phos  2.8     03-23  Mg     1.6     03-23    TPro  5.2<L>  /  Alb  2.7<L>  /  TBili  7.1<H>  /  DBili  x   /  AST  33  /  ALT  27  /  AlkPhos  141<H>  03-23    PT/INR - ( 22 Mar 2020 09:48 )   PT: 25.2 sec;   INR: 2.14 ratio         PTT - ( 22 Mar 2020 09:48 )  PTT:50.7 sec            RADIOLOGY & ADDITIONAL TESTS:  Results Reviewed:   Imaging Personally Reviewed:  Electrocardiogram Personally Reviewed:    COORDINATION OF CARE:  Care Discussed with Consultants/Other Providers [Y/N]:  Prior or Outpatient Records Reviewed [Y/N]: PROGRESS NOTE:     Patient is a 64y old  Male who presents with a chief complaint of Syncopal episode at home at 0430 this am, LOC for approx 3 minutes per wife, pt had episode of shaking during that time. Hx of multiple falls. Pt reporting abdominal pain for X 1day, no nausea or vomiting, endorses loose stool on lactulose at home (22 Mar 2020 13:34)      SUBJECTIVE / OVERNIGHT EVENTS:  No acute overnight events. Hb this AM 6.8, repeat 7.0, Plts stable 60. Patient hemodynamically stable, asymptomatic, denies lightheadedness/dizziness, chest pain, SOB, palpitations. Strongly wants to go home today, agreeable to blood transfusion.    ADDITIONAL REVIEW OF SYSTEMS:  Denies abd pain, N/V/constipation, extremity pain/swelling.    MEDICATIONS  (STANDING):  chlorhexidine 2% Cloths 1 Application(s) Topical <User Schedule>  dextrose 5%. 1000 milliLiter(s) (50 mL/Hr) IV Continuous <Continuous>  dextrose 50% Injectable 12.5 Gram(s) IV Push once  dextrose 50% Injectable 25 Gram(s) IV Push once  dextrose 50% Injectable 25 Gram(s) IV Push once  ertapenem  IVPB 1000 milliGRAM(s) IV Intermittent every 24 hours  folic acid 1 milliGRAM(s) Oral daily  insulin glargine Injectable (LANTUS) 12 Unit(s) SubCutaneous at bedtime  insulin lispro (HumaLOG) corrective regimen sliding scale   SubCutaneous three times a day before meals  insulin lispro (HumaLOG) corrective regimen sliding scale   SubCutaneous at bedtime  insulin lispro Injectable (HumaLOG) 3 Unit(s) SubCutaneous three times a day before meals  lactulose Syrup 30 Gram(s) Oral three times a day  midodrine. 30 milliGRAM(s) Oral every 8 hours  pantoprazole    Tablet 40 milliGRAM(s) Oral before breakfast  rifAXIMin 550 milliGRAM(s) Oral two times a day  tamsulosin 0.4 milliGRAM(s) Oral at bedtime    MEDICATIONS  (PRN):  dextrose 40% Gel 15 Gram(s) Oral once PRN Blood Glucose LESS THAN 70 milliGRAM(s)/deciLiter  glucagon  Injectable 1 milliGRAM(s) IntraMuscular once PRN Glucose <70 milliGRAM(s)/deciLiter      CAPILLARY BLOOD GLUCOSE      POCT Blood Glucose.: 227 mg/dL (22 Mar 2020 21:38)  POCT Blood Glucose.: 210 mg/dL (22 Mar 2020 17:40)  POCT Blood Glucose.: 171 mg/dL (22 Mar 2020 12:21)  POCT Blood Glucose.: 101 mg/dL (22 Mar 2020 08:31)    I&O's Summary    22 Mar 2020 07:01  -  23 Mar 2020 07:00  --------------------------------------------------------  IN: 120 mL / OUT: 1350 mL / NET: -1230 mL        PHYSICAL EXAM:  Vital Signs Last 24 Hrs  T(C): 36.9 (23 Mar 2020 05:15), Max: 36.9 (23 Mar 2020 05:15)  T(F): 98.4 (23 Mar 2020 05:15), Max: 98.4 (23 Mar 2020 05:15)  HR: 87 (23 Mar 2020 05:15) (80 - 87)  BP: 102/58 (23 Mar 2020 05:15) (102/56 - 123/69)  BP(mean): --  RR: 18 (23 Mar 2020 05:15) (15 - 18)  SpO2: 99% (23 Mar 2020 05:15) (98% - 100%)    CONSTITUTIONAL: NAD  RESPIRATORY: Normal respiratory effort; lungs are clear to auscultation bilaterally  CARDIOVASCULAR: Regular rate and rhythm, normal S1 and S2, no murmur/rub/gallop  ABDOMEN: Large abd, nontender to palpation, Soft, + bowel sounds   MUSCLOSKELETAL: No LE edema   NEURO: Alert, good concentration     LABS:                        7.3    4.90  )-----------( 57       ( 22 Mar 2020 05:25 )             21.6     03-23    136  |  105  |  24<H>  ----------------------------<  138<H>  4.1   |  22  |  1.02    Ca    9.2      23 Mar 2020 06:36  Phos  2.8     03-23  Mg     1.6     03-23    TPro  5.2<L>  /  Alb  2.7<L>  /  TBili  7.1<H>  /  DBili  x   /  AST  33  /  ALT  27  /  AlkPhos  141<H>  03-23    PT/INR - ( 22 Mar 2020 09:48 )   PT: 25.2 sec;   INR: 2.14 ratio         PTT - ( 22 Mar 2020 09:48 )  PTT:50.7 sec            RADIOLOGY & ADDITIONAL TESTS:  Results Reviewed:   Imaging Personally Reviewed:  Electrocardiogram Personally Reviewed:    COORDINATION OF CARE:  Care Discussed with Consultants/Other Providers [Y/N]:  Prior or Outpatient Records Reviewed [Y/N]: PROGRESS NOTE:     Patient is a 64y old  Male who presents with a chief complaint of Syncopal episode at home at 0430 this am, LOC for approx 3 minutes per wife, pt had episode of shaking during that time. Hx of multiple falls. Pt reporting abdominal pain for X 1day, no nausea or vomiting, endorses loose stool on lactulose at home (22 Mar 2020 13:34)      SUBJECTIVE / OVERNIGHT EVENTS:  No acute overnight events. Hb this AM 6.8, repeat 7.0, Plts stable 60. Patient hemodynamically stable, asymptomatic, denies lightheadedness/dizziness, chest pain, SOB, palpitations. Strongly wants to go home today, agreeable to blood transfusion. Per RN bob draining bloody urine intermittently, has been bloody as of this morning. Midline also intermittently leaking.    ADDITIONAL REVIEW OF SYSTEMS:  Denies abd pain, N/V/constipation, extremity pain/swelling.    MEDICATIONS  (STANDING):  chlorhexidine 2% Cloths 1 Application(s) Topical <User Schedule>  dextrose 5%. 1000 milliLiter(s) (50 mL/Hr) IV Continuous <Continuous>  dextrose 50% Injectable 12.5 Gram(s) IV Push once  dextrose 50% Injectable 25 Gram(s) IV Push once  dextrose 50% Injectable 25 Gram(s) IV Push once  ertapenem  IVPB 1000 milliGRAM(s) IV Intermittent every 24 hours  folic acid 1 milliGRAM(s) Oral daily  insulin glargine Injectable (LANTUS) 12 Unit(s) SubCutaneous at bedtime  insulin lispro (HumaLOG) corrective regimen sliding scale   SubCutaneous three times a day before meals  insulin lispro (HumaLOG) corrective regimen sliding scale   SubCutaneous at bedtime  insulin lispro Injectable (HumaLOG) 3 Unit(s) SubCutaneous three times a day before meals  lactulose Syrup 30 Gram(s) Oral three times a day  midodrine. 30 milliGRAM(s) Oral every 8 hours  pantoprazole    Tablet 40 milliGRAM(s) Oral before breakfast  rifAXIMin 550 milliGRAM(s) Oral two times a day  tamsulosin 0.4 milliGRAM(s) Oral at bedtime    MEDICATIONS  (PRN):  dextrose 40% Gel 15 Gram(s) Oral once PRN Blood Glucose LESS THAN 70 milliGRAM(s)/deciLiter  glucagon  Injectable 1 milliGRAM(s) IntraMuscular once PRN Glucose <70 milliGRAM(s)/deciLiter      CAPILLARY BLOOD GLUCOSE      POCT Blood Glucose.: 227 mg/dL (22 Mar 2020 21:38)  POCT Blood Glucose.: 210 mg/dL (22 Mar 2020 17:40)  POCT Blood Glucose.: 171 mg/dL (22 Mar 2020 12:21)  POCT Blood Glucose.: 101 mg/dL (22 Mar 2020 08:31)    I&O's Summary    22 Mar 2020 07:01  -  23 Mar 2020 07:00  --------------------------------------------------------  IN: 120 mL / OUT: 1350 mL / NET: -1230 mL        PHYSICAL EXAM:  Vital Signs Last 24 Hrs  T(C): 36.9 (23 Mar 2020 05:15), Max: 36.9 (23 Mar 2020 05:15)  T(F): 98.4 (23 Mar 2020 05:15), Max: 98.4 (23 Mar 2020 05:15)  HR: 87 (23 Mar 2020 05:15) (80 - 87)  BP: 102/58 (23 Mar 2020 05:15) (102/56 - 123/69)  BP(mean): --  RR: 18 (23 Mar 2020 05:15) (15 - 18)  SpO2: 99% (23 Mar 2020 05:15) (98% - 100%)    CONSTITUTIONAL: NAD  RESPIRATORY: Normal respiratory effort; lungs are clear to auscultation bilaterally  CARDIOVASCULAR: Regular rate and rhythm, normal S1 and S2, no murmur/rub/gallop  ABDOMEN: Large abd, nontender to palpation, Soft, + bowel sounds   MUSCLOSKELETAL: No LE edema   NEURO: Alert, good concentration   LINES: bob draining bloody urine    LABS:                        7.3    4.90  )-----------( 57       ( 22 Mar 2020 05:25 )             21.6     03-23    136  |  105  |  24<H>  ----------------------------<  138<H>  4.1   |  22  |  1.02    Ca    9.2      23 Mar 2020 06:36  Phos  2.8     03-23  Mg     1.6     03-23    TPro  5.2<L>  /  Alb  2.7<L>  /  TBili  7.1<H>  /  DBili  x   /  AST  33  /  ALT  27  /  AlkPhos  141<H>  03-23    PT/INR - ( 22 Mar 2020 09:48 )   PT: 25.2 sec;   INR: 2.14 ratio         PTT - ( 22 Mar 2020 09:48 )  PTT:50.7 sec            RADIOLOGY & ADDITIONAL TESTS:  Results Reviewed:   Imaging Personally Reviewed:  Electrocardiogram Personally Reviewed:    COORDINATION OF CARE:  Care Discussed with Consultants/Other Providers [Y/N]:  Prior or Outpatient Records Reviewed [Y/N]:

## 2020-03-23 NOTE — PROGRESS NOTE ADULT - PROBLEM SELECTOR PLAN 6
B12 was high in 12/19  Folate was 10.9 in 12/19  - C/w folic acid    - Hemoglobin is currently stable    # Acute blood loss anemia   After cystoscopy and transurethral drainage of prostatic abscess patient developed hypotension, altered mental status and bright red blood in bob bag. Vasopressor support was initiated with Levo and Vaso and MTP was activated, 4 units of FFP and 4units of PRBC were transfused, also received 1unit platelets and vitamin K.  - Hgb currently stable  - Continue to monitor CBCs B12 was high in 12/19  Folate was 10.9 in 12/19  - C/w folic acid    - Hemoglobin is currently stable    # Acute blood loss anemia   After cystoscopy and transurethral drainage of prostatic abscess patient developed hypotension, altered mental status and bright red blood in bob bag. Vasopressor support was initiated with Levo and Vaso and MTP was activated, 4 units of FFP and 4units of PRBC were transfused, also received 1unit platelets and vitamin K.  - Hgb downtrend to 6.8 this AM, transfuse 1u PRBCs. F/u post-transfusion CBC.  - Continue to monitor CBCs

## 2020-03-23 NOTE — PROVIDER CONTACT NOTE (CRITICAL VALUE NOTIFICATION) - ACTION/TREATMENT ORDERED:
MD will confirm with team as to next interventions
Repeat CBC and Type and Screen
continue to monitor
Continue to monitor.

## 2020-03-24 ENCOUNTER — EMERGENCY (EMERGENCY)
Facility: HOSPITAL | Age: 65
LOS: 1 days | Discharge: HOME | End: 2020-03-24
Attending: EMERGENCY MEDICINE
Payer: MEDICARE

## 2020-03-24 ENCOUNTER — TRANSCRIPTION ENCOUNTER (OUTPATIENT)
Age: 65
End: 2020-03-24

## 2020-03-24 VITALS
DIASTOLIC BLOOD PRESSURE: 68 MMHG | WEIGHT: 250 LBS | HEIGHT: 73 IN | SYSTOLIC BLOOD PRESSURE: 120 MMHG | RESPIRATION RATE: 18 BRPM | TEMPERATURE: 98 F | OXYGEN SATURATION: 98 % | HEART RATE: 84 BPM

## 2020-03-24 VITALS
TEMPERATURE: 98 F | RESPIRATION RATE: 16 BRPM | HEART RATE: 77 BPM | DIASTOLIC BLOOD PRESSURE: 68 MMHG | SYSTOLIC BLOOD PRESSURE: 127 MMHG | OXYGEN SATURATION: 98 %

## 2020-03-24 DIAGNOSIS — Z90.49 ACQUIRED ABSENCE OF OTHER SPECIFIED PARTS OF DIGESTIVE TRACT: Chronic | ICD-10-CM

## 2020-03-24 LAB
GLUCOSE BLDC GLUCOMTR-MCNC: 111 MG/DL — HIGH (ref 70–99)
GLUCOSE BLDC GLUCOMTR-MCNC: 116 MG/DL — HIGH (ref 70–99)
GLUCOSE BLDC GLUCOMTR-MCNC: 159 MG/DL — HIGH (ref 70–99)

## 2020-03-24 PROCEDURE — 85027 COMPLETE CBC AUTOMATED: CPT

## 2020-03-24 PROCEDURE — 82533 TOTAL CORTISOL: CPT

## 2020-03-24 PROCEDURE — 84295 ASSAY OF SERUM SODIUM: CPT

## 2020-03-24 PROCEDURE — 93005 ELECTROCARDIOGRAM TRACING: CPT

## 2020-03-24 PROCEDURE — 86900 BLOOD TYPING SEROLOGIC ABO: CPT

## 2020-03-24 PROCEDURE — 84244 ASSAY OF RENIN: CPT

## 2020-03-24 PROCEDURE — 71045 X-RAY EXAM CHEST 1 VIEW: CPT

## 2020-03-24 PROCEDURE — 87040 BLOOD CULTURE FOR BACTERIA: CPT

## 2020-03-24 PROCEDURE — 36430 TRANSFUSION BLD/BLD COMPNT: CPT

## 2020-03-24 PROCEDURE — 82330 ASSAY OF CALCIUM: CPT

## 2020-03-24 PROCEDURE — 82962 GLUCOSE BLOOD TEST: CPT

## 2020-03-24 PROCEDURE — 84132 ASSAY OF SERUM POTASSIUM: CPT

## 2020-03-24 PROCEDURE — P9037: CPT

## 2020-03-24 PROCEDURE — 93010 ELECTROCARDIOGRAM REPORT: CPT

## 2020-03-24 PROCEDURE — 87205 SMEAR GRAM STAIN: CPT

## 2020-03-24 PROCEDURE — 83735 ASSAY OF MAGNESIUM: CPT

## 2020-03-24 PROCEDURE — 83605 ASSAY OF LACTIC ACID: CPT

## 2020-03-24 PROCEDURE — 84443 ASSAY THYROID STIM HORMONE: CPT

## 2020-03-24 PROCEDURE — 87070 CULTURE OTHR SPECIMN AEROBIC: CPT

## 2020-03-24 PROCEDURE — P9047: CPT

## 2020-03-24 PROCEDURE — 82435 ASSAY OF BLOOD CHLORIDE: CPT

## 2020-03-24 PROCEDURE — 99284 EMERGENCY DEPT VISIT MOD MDM: CPT | Mod: 25

## 2020-03-24 PROCEDURE — P9040: CPT

## 2020-03-24 PROCEDURE — 82803 BLOOD GASES ANY COMBINATION: CPT

## 2020-03-24 PROCEDURE — 99284 EMERGENCY DEPT VISIT MOD MDM: CPT

## 2020-03-24 PROCEDURE — 97110 THERAPEUTIC EXERCISES: CPT

## 2020-03-24 PROCEDURE — 86850 RBC ANTIBODY SCREEN: CPT

## 2020-03-24 PROCEDURE — 84100 ASSAY OF PHOSPHORUS: CPT

## 2020-03-24 PROCEDURE — P9011: CPT

## 2020-03-24 PROCEDURE — 82553 CREATINE MB FRACTION: CPT

## 2020-03-24 PROCEDURE — P9016: CPT

## 2020-03-24 PROCEDURE — 82550 ASSAY OF CK (CPK): CPT

## 2020-03-24 PROCEDURE — 84145 PROCALCITONIN (PCT): CPT

## 2020-03-24 PROCEDURE — C1751: CPT

## 2020-03-24 PROCEDURE — 82088 ASSAY OF ALDOSTERONE: CPT

## 2020-03-24 PROCEDURE — 82024 ASSAY OF ACTH: CPT

## 2020-03-24 PROCEDURE — 81001 URINALYSIS AUTO W/SCOPE: CPT

## 2020-03-24 PROCEDURE — 82565 ASSAY OF CREATININE: CPT

## 2020-03-24 PROCEDURE — 86901 BLOOD TYPING SEROLOGIC RH(D): CPT

## 2020-03-24 PROCEDURE — 85610 PROTHROMBIN TIME: CPT

## 2020-03-24 PROCEDURE — 36415 COLL VENOUS BLD VENIPUNCTURE: CPT

## 2020-03-24 PROCEDURE — 99283 EMERGENCY DEPT VISIT LOW MDM: CPT

## 2020-03-24 PROCEDURE — 80053 COMPREHEN METABOLIC PANEL: CPT

## 2020-03-24 PROCEDURE — 85014 HEMATOCRIT: CPT

## 2020-03-24 PROCEDURE — P9045: CPT

## 2020-03-24 PROCEDURE — 82947 ASSAY GLUCOSE BLOOD QUANT: CPT

## 2020-03-24 PROCEDURE — 85730 THROMBOPLASTIN TIME PARTIAL: CPT

## 2020-03-24 PROCEDURE — 74177 CT ABD & PELVIS W/CONTRAST: CPT

## 2020-03-24 PROCEDURE — 70450 CT HEAD/BRAIN W/O DYE: CPT

## 2020-03-24 PROCEDURE — 85384 FIBRINOGEN ACTIVITY: CPT

## 2020-03-24 PROCEDURE — 87086 URINE CULTURE/COLONY COUNT: CPT

## 2020-03-24 PROCEDURE — 97530 THERAPEUTIC ACTIVITIES: CPT

## 2020-03-24 PROCEDURE — 84449 ASSAY OF TRANSCORTIN: CPT

## 2020-03-24 PROCEDURE — 97535 SELF CARE MNGMENT TRAINING: CPT

## 2020-03-24 PROCEDURE — 74176 CT ABD & PELVIS W/O CONTRAST: CPT

## 2020-03-24 PROCEDURE — 99232 SBSQ HOSP IP/OBS MODERATE 35: CPT

## 2020-03-24 PROCEDURE — 97161 PT EVAL LOW COMPLEX 20 MIN: CPT

## 2020-03-24 PROCEDURE — 88305 TISSUE EXAM BY PATHOLOGIST: CPT

## 2020-03-24 PROCEDURE — 86923 COMPATIBILITY TEST ELECTRIC: CPT

## 2020-03-24 PROCEDURE — 76705 ECHO EXAM OF ABDOMEN: CPT

## 2020-03-24 PROCEDURE — 87075 CULTR BACTERIA EXCEPT BLOOD: CPT

## 2020-03-24 PROCEDURE — 80076 HEPATIC FUNCTION PANEL: CPT

## 2020-03-24 PROCEDURE — 87186 SC STD MICRODIL/AGAR DIL: CPT

## 2020-03-24 PROCEDURE — 72192 CT PELVIS W/O DYE: CPT

## 2020-03-24 PROCEDURE — 99285 EMERGENCY DEPT VISIT HI MDM: CPT | Mod: 25

## 2020-03-24 PROCEDURE — 97166 OT EVAL MOD COMPLEX 45 MIN: CPT

## 2020-03-24 PROCEDURE — 99239 HOSP IP/OBS DSCHRG MGMT >30: CPT | Mod: GC

## 2020-03-24 PROCEDURE — 82140 ASSAY OF AMMONIA: CPT

## 2020-03-24 PROCEDURE — 80048 BASIC METABOLIC PNL TOTAL CA: CPT

## 2020-03-24 PROCEDURE — 84484 ASSAY OF TROPONIN QUANT: CPT

## 2020-03-24 PROCEDURE — 36569 INSJ PICC 5 YR+ W/O IMAGING: CPT

## 2020-03-24 RX ORDER — LACTULOSE 10 G/15ML
15 SOLUTION ORAL
Qty: 0 | Refills: 0 | DISCHARGE

## 2020-03-24 RX ORDER — PANTOPRAZOLE SODIUM 20 MG/1
1 TABLET, DELAYED RELEASE ORAL
Qty: 0 | Refills: 0 | DISCHARGE

## 2020-03-24 RX ORDER — INSULIN GLARGINE 100 [IU]/ML
12 INJECTION, SOLUTION SUBCUTANEOUS
Qty: 0 | Refills: 0 | DISCHARGE

## 2020-03-24 RX ORDER — INSULIN GLARGINE 100 [IU]/ML
10 INJECTION, SOLUTION SUBCUTANEOUS
Qty: 0 | Refills: 0 | DISCHARGE

## 2020-03-24 RX ORDER — LACTULOSE 10 G/15ML
45 SOLUTION ORAL
Qty: 0 | Refills: 0 | DISCHARGE
Start: 2020-03-24

## 2020-03-24 RX ORDER — FUROSEMIDE 40 MG
1 TABLET ORAL
Qty: 0 | Refills: 0 | DISCHARGE

## 2020-03-24 RX ORDER — ERTAPENEM SODIUM 1 G/1
1000 INJECTION, POWDER, LYOPHILIZED, FOR SOLUTION INTRAMUSCULAR; INTRAVENOUS ONCE
Refills: 0 | Status: COMPLETED | OUTPATIENT
Start: 2020-03-24 | End: 2020-03-24

## 2020-03-24 RX ORDER — INSULIN LISPRO 100/ML
6 VIAL (ML) SUBCUTANEOUS
Qty: 0 | Refills: 0 | DISCHARGE

## 2020-03-24 RX ORDER — INSULIN GLARGINE 100 [IU]/ML
10 INJECTION, SOLUTION SUBCUTANEOUS AT BEDTIME
Refills: 0 | Status: DISCONTINUED | OUTPATIENT
Start: 2020-03-24 | End: 2020-03-24

## 2020-03-24 RX ORDER — ERTAPENEM SODIUM 1 G/1
INJECTION, POWDER, LYOPHILIZED, FOR SOLUTION INTRAMUSCULAR; INTRAVENOUS
Refills: 0 | Status: DISCONTINUED | OUTPATIENT
Start: 2020-03-24 | End: 2020-03-24

## 2020-03-24 RX ORDER — ERTAPENEM SODIUM 1 G/1
1000 INJECTION, POWDER, LYOPHILIZED, FOR SOLUTION INTRAMUSCULAR; INTRAVENOUS EVERY 24 HOURS
Refills: 0 | Status: DISCONTINUED | OUTPATIENT
Start: 2020-03-25 | End: 2020-03-24

## 2020-03-24 RX ORDER — SPIRONOLACTONE 25 MG/1
2 TABLET, FILM COATED ORAL
Qty: 0 | Refills: 0 | DISCHARGE

## 2020-03-24 RX ADMIN — Medication 3 UNIT(S): at 08:59

## 2020-03-24 RX ADMIN — ERTAPENEM SODIUM 120 MILLIGRAM(S): 1 INJECTION, POWDER, LYOPHILIZED, FOR SOLUTION INTRAMUSCULAR; INTRAVENOUS at 10:24

## 2020-03-24 RX ADMIN — MIDODRINE HYDROCHLORIDE 30 MILLIGRAM(S): 2.5 TABLET ORAL at 05:55

## 2020-03-24 RX ADMIN — Medication 3 UNIT(S): at 12:23

## 2020-03-24 RX ADMIN — CHLORHEXIDINE GLUCONATE 1 APPLICATION(S): 213 SOLUTION TOPICAL at 05:52

## 2020-03-24 RX ADMIN — LACTULOSE 30 GRAM(S): 10 SOLUTION ORAL at 12:23

## 2020-03-24 RX ADMIN — PANTOPRAZOLE SODIUM 40 MILLIGRAM(S): 20 TABLET, DELAYED RELEASE ORAL at 08:59

## 2020-03-24 RX ADMIN — Medication 3 UNIT(S): at 17:56

## 2020-03-24 RX ADMIN — Medication 1: at 17:56

## 2020-03-24 RX ADMIN — LACTULOSE 30 GRAM(S): 10 SOLUTION ORAL at 05:52

## 2020-03-24 RX ADMIN — Medication 1 MILLIGRAM(S): at 12:22

## 2020-03-24 RX ADMIN — MIDODRINE HYDROCHLORIDE 30 MILLIGRAM(S): 2.5 TABLET ORAL at 14:44

## 2020-03-24 NOTE — PROVIDER CONTACT NOTE (OTHER) - RECOMMENDATIONS
RRT called, MICU consulted, pt treated ands was stabilized
have provider come and assess patient and bob bag. Will call PICC Nurse to change dressing. Patient planning on being d/c; will patient leave or will there be a possible change in IV access?
Md to assess patient at bedside

## 2020-03-24 NOTE — CHART NOTE - NSCHARTNOTEFT_GEN_A_CORE
Chart reviewed. Labs stable.    Recommendations  Please check MELD Na labs daily  Continue IV abx  Hold diuretics for now  Continue with midodrine 30mg TID  Continue Lactulose and Rifaximin   Rest of plan as per primary team

## 2020-03-24 NOTE — DISCHARGE NOTE NURSING/CASE MANAGEMENT/SOCIAL WORK - PATIENT PORTAL LINK FT
You can access the FollowMyHealth Patient Portal offered by [facility] by registering at the following website: [email or web address]. By joining [employer]’s FollowMyHealth portal, you will also be able to view your health information using other applications (apps) compatible with our system.

## 2020-03-24 NOTE — PROVIDER CONTACT NOTE (OTHER) - SITUATION
change in status, s/p turp, hematuria
Patient Midline dressing soiled in blood; Patient with bloody tinged urine; will pt still be d/c?
Patient with bob in place, with hematuria and leaking noted

## 2020-03-24 NOTE — PROGRESS NOTE ADULT - PROBLEM SELECTOR PLAN 6
B12 was high in 12/19  Folate was 10.9 in 12/19  - C/w folic acid    - Hemoglobin is currently stable    # Acute blood loss anemia   After cystoscopy and transurethral drainage of prostatic abscess patient developed hypotension, altered mental status and bright red blood in bob bag. Vasopressor support was initiated with Levo and Vaso and MTP was activated, 4 units of FFP and 4units of PRBC were transfused, also received 1unit platelets and vitamin K.  - Hgb downtrend to 6.8 this AM, transfuse 1u PRBCs. F/u post-transfusion CBC.  - Continue to monitor CBCs

## 2020-03-24 NOTE — PROGRESS NOTE ADULT - ASSESSMENT
65 y/o M w/ Type 2 DM controlled per A1C 6.4%> on basal/bolus tx PTA. DM c/b neuropathy and retinopathy. Also HTN/ HLD/obesity/BPH/HF and JEAN cirrhosis. Here s/p fall at home, general weakness/syncope found to have scrotal abscess> s/p cystoscopy and transurethral drainage of prostatic abscess (3/15/20) with postop hypotension, altered mental status and bright red blood in bob bag. Pt states feeling better. Tolerating POs with BG values mostly at goal while on present insulin doses. Noted BG at hs >20s last night but pt reports he had an orange 15-20 minutes before BG was tested. Denies eating any food overnight with FBG dropping to 111 this am. No hypoglycemia. Feels tired and sleepy at time of visit. On Lactulose.  Will preventively decrease Lantus dose and c/w same premeal Humalog    Due to [facility] policy during the evolving novel coronavirus outbreak, efforts are being made to limit unnecessary patient contacts to limit the spread of disease.   Accordingly, patients without clear indication for physical exam or face to face interview from the Endocrine team will be adjusted in conjunction with conversations with primary provider team. This is being done for the safety of all the patients we care for. 65 y/o M w/ Type 2 DM controlled per A1C 6.4%> on basal/bolus tx PTA. DM c/b neuropathy and retinopathy. Also HTN/ HLD/obesity/BPH/HF and JEAN cirrhosis. Here s/p fall at home, general weakness/syncope found to have scrotal abscess> s/p cystoscopy and transurethral drainage of prostatic abscess (3/15/20) with postop hypotension, altered mental status and bright red blood in bob bag. Pt states feeling better. Tolerating POs with BG values mostly at goal while on present insulin doses. Noted BG at hs >20s last night but pt reports he had an orange 15-20 minutes before BG was tested. Denies eating any food overnight with FBG dropping to 111 this am. No hypoglycemia. Feels tired and sleepy at time of visit. On Lactulose.  Will preventively decrease Lantus dose and c/w same premeal Humalog    Spent 25 minutes assessing pt/labs/meds and discussing plan of care with primary team/pt    Due to [facility] policy during the evolving novel coronavirus outbreak, efforts are being made to limit unnecessary patient contacts to limit the spread of disease.   Accordingly, patients without clear indication for physical exam or face to face interview from the Endocrine team will be adjusted in conjunction with conversations with primary provider team. This is being done for the safety of all the patients we care for.

## 2020-03-24 NOTE — PROGRESS NOTE ADULT - PROBLEM SELECTOR PROBLEM 3
Essential hypertension
Hypertension, unspecified type
Sepsis
Syncope and collapse
Syncope and collapse
Sepsis

## 2020-03-24 NOTE — ED ADULT NURSE NOTE - NSIMPLEMENTINTERV_GEN_ALL_ED
Implemented All Fall Risk Interventions:  [place] to call system. Call bell, personal items and telephone within reach. Instruct patient to call for assistance. Room bathroom lighting operational. Non-slip footwear when patient is off stretcher. Physically safe environment: no spills, clutter or unnecessary equipment. Stretcher in lowest position, wheels locked, appropriate side rails in place. Provide visual cue, wrist band, yellow gown, etc. Monitor gait and stability. Monitor for mental status changes and reorient to person, place, and time. Review medications for side effects contributing to fall risk. Reinforce activity limits and safety measures with patient and family.

## 2020-03-24 NOTE — ED PROVIDER NOTE - OBJECTIVE STATEMENT
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 Was just discharged today after prolonged hospital stay for syncope work up. Patient states that his legs felt weak, hasn't been walking well recently. Saw PT inpatient, and is already set up with outpatient PT. Denies LOC, hitting head, chest pain. Also of concern, patient states that PICC line might have been pulled out from the fall.

## 2020-03-24 NOTE — ED PROVIDER NOTE - PATIENT PORTAL LINK FT
You can access the FollowMyHealth Patient Portal offered by [facility] by registering at the following website: [email or web address]. By joining [employer]’s FollowMyHealth portal, you will also be able to view your health information using other applications (apps) compatible with our system.

## 2020-03-24 NOTE — PROGRESS NOTE ADULT - PROBLEM SELECTOR PLAN 9
1.65 - FSG QAC/ HS and ISS  - Lantus 12 units QHS  - Premeal humalog 3u TID restarted 3/22  -A1C = 6.4 on 2/2020  -Endocrinology following. Discharge on basal/bolus, f/u outpt w/Dr Goldberg

## 2020-03-24 NOTE — ED ADULT TRIAGE NOTE - CHIEF COMPLAINT QUOTE
d/c from [facility] 1 hour ago, syncope while walking up the stairs in his home, no head trauma, accidentally pulled out PICC line

## 2020-03-24 NOTE — PROGRESS NOTE ADULT - ASSESSMENT
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 syncope 2/2 sepsis from UTI w/ prostatic abscess. Now s/p TURP with drainage & bob placement, s/p midline placement.

## 2020-03-24 NOTE — PROGRESS NOTE ADULT - SUBJECTIVE AND OBJECTIVE BOX
PROGRESS NOTE:     Patient is a 64y old  Male who presents with a chief complaint of Syncopal episode at home at 0430 this am, LOC for approx 3 minutes per wife, pt had episode of shaking during that time. Hx of multiple falls. Pt reporting abdominal pain for X 1day, no nausea or vomiting, endorses loose stool on lactulose at home (23 Mar 2020 08:56)      SUBJECTIVE / OVERNIGHT EVENTS:    ADDITIONAL REVIEW OF SYSTEMS:    MEDICATIONS  (STANDING):  chlorhexidine 2% Cloths 1 Application(s) Topical <User Schedule>  dextrose 5%. 1000 milliLiter(s) (50 mL/Hr) IV Continuous <Continuous>  dextrose 50% Injectable 12.5 Gram(s) IV Push once  dextrose 50% Injectable 25 Gram(s) IV Push once  dextrose 50% Injectable 25 Gram(s) IV Push once  folic acid 1 milliGRAM(s) Oral daily  insulin glargine Injectable (LANTUS) 12 Unit(s) SubCutaneous at bedtime  insulin lispro (HumaLOG) corrective regimen sliding scale   SubCutaneous three times a day before meals  insulin lispro (HumaLOG) corrective regimen sliding scale   SubCutaneous at bedtime  insulin lispro Injectable (HumaLOG) 3 Unit(s) SubCutaneous three times a day before meals  lactulose Syrup 30 Gram(s) Oral three times a day  midodrine. 30 milliGRAM(s) Oral every 8 hours  pantoprazole    Tablet 40 milliGRAM(s) Oral before breakfast  rifAXIMin 550 milliGRAM(s) Oral two times a day  tamsulosin 0.4 milliGRAM(s) Oral at bedtime    MEDICATIONS  (PRN):  dextrose 40% Gel 15 Gram(s) Oral once PRN Blood Glucose LESS THAN 70 milliGRAM(s)/deciLiter  glucagon  Injectable 1 milliGRAM(s) IntraMuscular once PRN Glucose <70 milliGRAM(s)/deciLiter      CAPILLARY BLOOD GLUCOSE      POCT Blood Glucose.: 258 mg/dL (23 Mar 2020 21:45)  POCT Blood Glucose.: 189 mg/dL (23 Mar 2020 17:19)  POCT Blood Glucose.: 132 mg/dL (23 Mar 2020 12:26)  POCT Blood Glucose.: 120 mg/dL (23 Mar 2020 08:34)    I&O's Summary    23 Mar 2020 07:01  -  24 Mar 2020 07:00  --------------------------------------------------------  IN: 1260 mL / OUT: 1700 mL / NET: -440 mL        PHYSICAL EXAM:  Vital Signs Last 24 Hrs  T(C): 36.7 (24 Mar 2020 05:22), Max: 37.1 (23 Mar 2020 08:38)  T(F): 98 (24 Mar 2020 05:22), Max: 98.8 (23 Mar 2020 08:38)  HR: 77 (24 Mar 2020 05:22) (64 - 80)  BP: 113/57 (24 Mar 2020 05:22) (113/57 - 154/73)  BP(mean): --  RR: 18 (24 Mar 2020 05:22) (18 - 18)  SpO2: 98% (24 Mar 2020 05:22) (98% - 100%)    CONSTITUTIONAL: NAD  RESPIRATORY: Normal respiratory effort; lungs are clear to auscultation bilaterally  CARDIOVASCULAR: Regular rate and rhythm, normal S1 and S2, no murmur/rub/gallop  ABDOMEN: Large abd, nontender to palpation, Soft, + bowel sounds   MUSCLOSKELETAL: No LE edema   NEURO: Alert, good concentration   LINES: bob draining bloody urine      LABS:                        7.6    4.29  )-----------( 61       ( 23 Mar 2020 16:23 )             23.7     03-23    136  |  105  |  24<H>  ----------------------------<  138<H>  4.1   |  22  |  1.02    Ca    9.2      23 Mar 2020 06:36  Phos  2.8     03-23  Mg     1.6     03-23    TPro  5.2<L>  /  Alb  2.7<L>  /  TBili  7.1<H>  /  DBili  x   /  AST  33  /  ALT  27  /  AlkPhos  141<H>  03-23    PT/INR - ( 22 Mar 2020 09:48 )   PT: 25.2 sec;   INR: 2.14 ratio         PTT - ( 22 Mar 2020 09:48 )  PTT:50.7 sec            RADIOLOGY & ADDITIONAL TESTS:  Results Reviewed:   Imaging Personally Reviewed:  Electrocardiogram Personally Reviewed:    COORDINATION OF CARE:  Care Discussed with Consultants/Other Providers [Y/N]:  Prior or Outpatient Records Reviewed [Y/N]: PROGRESS NOTE:     Patient is a 64y old  Male who presents with a chief complaint of Syncopal episode at home at 0430 this am, LOC for approx 3 minutes per wife, pt had episode of shaking during that time. Hx of multiple falls. Pt reporting abdominal pain for X 1day, no nausea or vomiting, endorses loose stool on lactulose at home (23 Mar 2020 08:56)      SUBJECTIVE / OVERNIGHT EVENTS:  No acute events overnight. Patient reports doing well. Denies lightheadedness/dizziness. Denies pain with bob or midline.    ADDITIONAL REVIEW OF SYSTEMS:  Denies fever/chills, chest pain, SOB, abd pain, n/v/d, constipation, extremity pain/swelling.    MEDICATIONS  (STANDING):  chlorhexidine 2% Cloths 1 Application(s) Topical <User Schedule>  dextrose 5%. 1000 milliLiter(s) (50 mL/Hr) IV Continuous <Continuous>  dextrose 50% Injectable 12.5 Gram(s) IV Push once  dextrose 50% Injectable 25 Gram(s) IV Push once  dextrose 50% Injectable 25 Gram(s) IV Push once  folic acid 1 milliGRAM(s) Oral daily  insulin glargine Injectable (LANTUS) 12 Unit(s) SubCutaneous at bedtime  insulin lispro (HumaLOG) corrective regimen sliding scale   SubCutaneous three times a day before meals  insulin lispro (HumaLOG) corrective regimen sliding scale   SubCutaneous at bedtime  insulin lispro Injectable (HumaLOG) 3 Unit(s) SubCutaneous three times a day before meals  lactulose Syrup 30 Gram(s) Oral three times a day  midodrine. 30 milliGRAM(s) Oral every 8 hours  pantoprazole    Tablet 40 milliGRAM(s) Oral before breakfast  rifAXIMin 550 milliGRAM(s) Oral two times a day  tamsulosin 0.4 milliGRAM(s) Oral at bedtime    MEDICATIONS  (PRN):  dextrose 40% Gel 15 Gram(s) Oral once PRN Blood Glucose LESS THAN 70 milliGRAM(s)/deciLiter  glucagon  Injectable 1 milliGRAM(s) IntraMuscular once PRN Glucose <70 milliGRAM(s)/deciLiter      CAPILLARY BLOOD GLUCOSE      POCT Blood Glucose.: 258 mg/dL (23 Mar 2020 21:45)  POCT Blood Glucose.: 189 mg/dL (23 Mar 2020 17:19)  POCT Blood Glucose.: 132 mg/dL (23 Mar 2020 12:26)  POCT Blood Glucose.: 120 mg/dL (23 Mar 2020 08:34)    I&O's Summary    23 Mar 2020 07:01  -  24 Mar 2020 07:00  --------------------------------------------------------  IN: 1260 mL / OUT: 1700 mL / NET: -440 mL        PHYSICAL EXAM:  Vital Signs Last 24 Hrs  T(C): 36.7 (24 Mar 2020 05:22), Max: 37.1 (23 Mar 2020 08:38)  T(F): 98 (24 Mar 2020 05:22), Max: 98.8 (23 Mar 2020 08:38)  HR: 77 (24 Mar 2020 05:22) (64 - 80)  BP: 113/57 (24 Mar 2020 05:22) (113/57 - 154/73)  BP(mean): --  RR: 18 (24 Mar 2020 05:22) (18 - 18)  SpO2: 98% (24 Mar 2020 05:22) (98% - 100%)    CONSTITUTIONAL: NAD  RESPIRATORY: Normal respiratory effort; lungs are clear to auscultation bilaterally  CARDIOVASCULAR: Regular rate and rhythm, normal S1 and S2, no murmur/rub/gallop  ABDOMEN: Large abd, nontender to palpation, Soft, + bowel sounds   MUSCLOSKELETAL: No LE edema. LUE with midline in place, tegaderm intact.  NEURO: Alert, good concentration   LINES: bob draining blood tinged urine      LABS:                        7.6    4.29  )-----------( 61       ( 23 Mar 2020 16:23 )             23.7     03-23    136  |  105  |  24<H>  ----------------------------<  138<H>  4.1   |  22  |  1.02    Ca    9.2      23 Mar 2020 06:36  Phos  2.8     03-23  Mg     1.6     03-23    TPro  5.2<L>  /  Alb  2.7<L>  /  TBili  7.1<H>  /  DBili  x   /  AST  33  /  ALT  27  /  AlkPhos  141<H>  03-23    PT/INR - ( 22 Mar 2020 09:48 )   PT: 25.2 sec;   INR: 2.14 ratio         PTT - ( 22 Mar 2020 09:48 )  PTT:50.7 sec            RADIOLOGY & ADDITIONAL TESTS:  Results Reviewed:   Imaging Personally Reviewed:  Electrocardiogram Personally Reviewed:    COORDINATION OF CARE:  Care Discussed with Consultants/Other Providers [Y/N]:  Prior or Outpatient Records Reviewed [Y/N]:

## 2020-03-24 NOTE — ED ADULT NURSE NOTE - OBJECTIVE STATEMENT
63 y/o male PMH prostate abscess, cirrhosis of liver, syncope, anemia and diabetes presents to ED via EMS reports syncopal episode. Per EMS, pts children called EMS when pt syncopized but was caught by children at home, pt denies hitting head or falling. EMS reports pt was discharged from inpatient [facility] today. EMS also reports L PICC line may have been removed when pt was caught, pt gets Ertapenem via PICC line, covered with gauze by EMS, no active bleeding at this time. On exam, AOx3, speaking in complete sentences. Weakness noted to all 4 extremities. Lung sounds CTA, NAD. Abdomen soft, non-tender, non-distended, normoactive bowel sounds. Pt denies CP, SOB, n/v/d, fever/chills, cough and recent travel. Awaiting evaluation by MD at this time.

## 2020-03-24 NOTE — ED PROVIDER NOTE - PHYSICAL EXAMINATION
Const: Well-nourished, Well-developed, appearing stated age.  Eyes: PERRL, no conjunctival injection, and symmetrical lids.  HEENT: Head NCAT, no lesions. Atraumatic external nose and ears.   CVS: +S1/S2, Peripheral pulses 2+ and equal in b/l LE (DP).   RESP: Unlabored respiratory effort. Clear to auscultation bilaterally.  GI: Nontender/Nondistended, No hepatosplenomegaly.  MSK: Normocephalic/Atraumatic, Extremities w/o deformity or ttp. No cyanosis or clubbing  Skin: Warm, dry and intact. No rashes or lesions.  Neuro: CNs II-XII grossly intact. Motor & Sensation grossly intact LE.   Psych: Awake, Alert, & Oriented (AAO) x3. Appropriate mood and affect.

## 2020-03-24 NOTE — PROGRESS NOTE ADULT - PROBLEM SELECTOR PROBLEM 4
Cirrhosis of liver without ascites, unspecified hepatic cirrhosis type
Cirrhosis of liver without ascites, unspecified hepatic cirrhosis type
Syncope and collapse

## 2020-03-24 NOTE — DISCHARGE NOTE NURSING/CASE MANAGEMENT/SOCIAL WORK - NSDCVIVACCINE_GEN_ALL_CORE_FT
Influenza , 2019/12/17 10:15 , Rajinder Rivera (RN)  Pneumococcal Conjugate (PCV 13) , 2019/12/16 18:28 , Joide Serrano (RN)  Pneumococcal polysaccharide (PPSV23) , 2020/2/18 13:31 , Carroll Guardado (RN) Influenza , 2019/12/17 10:15 , Rajinder Rivera (RN)  Pneumococcal Conjugate (PCV 13) , 2019/12/16 18:28 , Jodie Serrano (RN)  Pneumococcal polysaccharide (PPSV23) , 2020/2/18 13:31 , Carroll Guardado (RN)

## 2020-03-24 NOTE — ED PROVIDER NOTE - CLINICAL SUMMARY MEDICAL DECISION MAKING FREE TEXT BOX
64M presenting for Fall with concern for PICC line coming out PE: VSS, unremarkable exam, PICC line flushing well Plan: CXR for PICC confirmation, basic labs, EKG

## 2020-03-24 NOTE — PROGRESS NOTE ADULT - PROBLEM SELECTOR PROBLEM 6
Macrocytic anemia
Orthostatic hypotension
Orthostatic hypotension
Macrocytic anemia

## 2020-03-24 NOTE — PROGRESS NOTE ADULT - SUBJECTIVE AND OBJECTIVE BOX
[facility]    Sushila Dhaliwal, Doug, Morenita, Ayo, Clay, Colleen      [phone]    CHIEF COMPLAINT: Patient is a 64y old  Male who presents with a chief complaint of Syncopal episode at home at 0430 this am, LOC for approx 3 minutes per wife, pt had episode of shaking during that time. Hx of multiple falls. Pt reporting abdominal pain for X 1day, no nausea or vomiting, endorses loose stool on lactulose at home (24 Mar 2020 07:35)      Follow Up: orthostatic hypotension, s/p shock    Interim history: The patient reports no new symptoms.  leaking from picc overnight. Denies chest discomfort and shortness of breath.  No abdominal pain.  No new neurologic symptoms.      MEDICATIONS  (STANDING):  chlorhexidine 2% Cloths 1 Application(s) Topical <User Schedule>  dextrose 5%. 1000 milliLiter(s) (50 mL/Hr) IV Continuous <Continuous>  dextrose 50% Injectable 12.5 Gram(s) IV Push once  dextrose 50% Injectable 25 Gram(s) IV Push once  dextrose 50% Injectable 25 Gram(s) IV Push once  ertapenem  IVPB      folic acid 1 milliGRAM(s) Oral daily  insulin glargine Injectable (LANTUS) 12 Unit(s) SubCutaneous at bedtime  insulin lispro (HumaLOG) corrective regimen sliding scale   SubCutaneous three times a day before meals  insulin lispro (HumaLOG) corrective regimen sliding scale   SubCutaneous at bedtime  insulin lispro Injectable (HumaLOG) 3 Unit(s) SubCutaneous three times a day before meals  lactulose Syrup 30 Gram(s) Oral three times a day  midodrine. 30 milliGRAM(s) Oral every 8 hours  pantoprazole    Tablet 40 milliGRAM(s) Oral before breakfast  rifAXIMin 550 milliGRAM(s) Oral two times a day  tamsulosin 0.4 milliGRAM(s) Oral at bedtime    MEDICATIONS  (PRN):  dextrose 40% Gel 15 Gram(s) Oral once PRN Blood Glucose LESS THAN 70 milliGRAM(s)/deciLiter  glucagon  Injectable 1 milliGRAM(s) IntraMuscular once PRN Glucose <70 milliGRAM(s)/deciLiter      REVIEW OF SYSTEMS:  eye, ent, GI, , allergic, dermatologic, musculoskeletal and neurologic are negative except as described above    Vital Signs Last 24 Hrs  T(C): 36.8 (24 Mar 2020 08:04), Max: 36.8 (24 Mar 2020 08:04)  T(F): 98.2 (24 Mar 2020 08:04), Max: 98.2 (24 Mar 2020 08:04)  HR: 75 (24 Mar 2020 08:04) (75 - 80)  BP: 112/64 (24 Mar 2020 08:04) (112/64 - 154/73)  BP(mean): --  RR: 18 (24 Mar 2020 08:04) (18 - 18)  SpO2: 100% (24 Mar 2020 08:04) (98% - 100%)    I&O's Summary    23 Mar 2020 07:01  -  24 Mar 2020 07:00  --------------------------------------------------------  IN: 1260 mL / OUT: 1700 mL / NET: -440 mL        Telemetry past 24h:    PHYSICAL EXAM:    Constitutional: well-nourished, well-developed, NAD   HEENT:  MMM, sclerae anicteric, conjunctivae clear, no oral cyanosis.  Pulmonary: Non-labored, breath sounds are clear bilaterally, No wheezing, rales or rhonchi  Cardiovascular: Regular, S1 and S2.  No murmur.  No rubs, gallops or clicks  Gastrointestinal: Bowel Sounds present, soft, nontender.   Lymph: No peripheral edema.   Neurological: Alert, no focal deficits  Skin: No rashes.  Psych:  Mood & affect appropriate    LABS: All Labs Reviewed:                        7.6    4.29  )-----------( 61       ( 23 Mar 2020 16:23 )             23.7                         7.0    4.33  )-----------( 60       ( 23 Mar 2020 08:12 )             21.6                         6.8    4.20  )-----------( 59       ( 23 Mar 2020 06:36 )             20.4     23 Mar 2020 06:36    136    |  105    |  24     ----------------------------<  138    4.1     |  22     |  1.02   22 Mar 2020 05:25    136    |  105    |  22     ----------------------------<  93     4.5     |  22     |  1.00     Ca    9.2        23 Mar 2020 06:36  Ca    9.1        22 Mar 2020 05:25  Phos  2.8       23 Mar 2020 06:36  Phos  2.8       22 Mar 2020 05:25  Mg     1.6       23 Mar 2020 06:36  Mg     1.8       22 Mar 2020 05:25    TPro  5.2    /  Alb  2.7    /  TBili  7.1    /  DBili  x      /  AST  33     /  ALT  27     /  AlkPhos  141    23 Mar 2020 06:36  TPro  5.2    /  Alb  2.6    /  TBili  7.2    /  DBili  x      /  AST  34     /  ALT  25     /  AlkPhos  137    22 Mar 2020 05:25          Blood Culture:         RADIOLOGY:    EKG:    Echo:

## 2020-03-24 NOTE — PROVIDER CONTACT NOTE (OTHER) - ASSESSMENT
Patient neurologically stable; patient denies pain or distress at this time. Patient midline catheter patent at this time and flushing well. Patient dressing bloody but blood within the parameter of the tegaderm/dressing. Patient urine dark bloody tinged at times.

## 2020-03-24 NOTE — PROVIDER CONTACT NOTE (OTHER) - BACKGROUND
cirrhosis of liver
Prostate Abscess   Patient s/p TURP
dx prostatic abcess, hx GI bleed, fatty liver disease, HTN, neuropathy, DM2, obesity

## 2020-03-24 NOTE — PROGRESS NOTE ADULT - PROBLEM SELECTOR PROBLEM 7
Essential hypertension
Essential hypertension
Orthostatic hypotension

## 2020-03-24 NOTE — ED PROVIDER NOTE - NS ED ROS FT
CONST: no fevers, no chills, no trauma  EYES: no pain, no visual disturbances  ENT: no sore throat, no epistaxis, no rhinorrhea, no hearing changes  CV: no chest pain, no palpitations, no orthopnea, no extremity pain or swelling  RESP: no shortness of breath, no cough, no sputum, no pleurisy, no wheezing  ABD: no abdominal pain, no nausea, no vomiting, no diarrhea, no black or bloody stool  : no dysuria, no hematuria, no frequency, no urgency  MSK: no back pain, no neck pain, no extremity pain  NEURO: no headache, no sensory disturbances, no focal weakness, no dizziness  SKIN: no diaphoresis, no rash

## 2020-03-24 NOTE — PROGRESS NOTE ADULT - PROBLEM SELECTOR PLAN 7
- C/w Midodrine  - Support stocking
BP is stable currently   in setting of sepsis and weakness / will hold diuretics
Holding in setting of sepsis and weakness
Midodrine 30 mg oral 3times /day   Low AM cortisol- Endo consult as above w/u for adrenal insufficiency Should be high in sepsis   Support stocking / will cont DASH diet for now
Midodrine 30 mg oral 3times /day   Support stocking
Midodrine 30 mg oral 3times /day   Support stocking
- C/w Midodrine  - Support stocking

## 2020-03-24 NOTE — PROGRESS NOTE ADULT - ASSESSMENT
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 syncope in setting of UTI/abscess.  He is s/p TURP, and now gross hematuria.  Had AMS and hypotension, likely a component of both septic and hypovolemic shock.  s/p mm blood products, and not on IV pressor support    - Continue to trend H/H and transfuse as needed  - remains on very high dose midodrine    - Hold diuretics as not vol ol  - Avoid QTC prolonging drugs  - Monitor and replete electrolytes. Keep K>4.0 and Mg>2.0.    - cont abx  - No signs of significant ischemia. Cath with non obs disease  - echo in 2/2020 with normal LV function. No need to repeat.    - Further cardiac workup will depend on clinical course.   - All other workup and dc planning per primary team. Will followup.

## 2020-03-24 NOTE — ED PROVIDER NOTE - ATTENDING CONTRIBUTION TO CARE
pt denies any LOC, direct trauma during his fall - states legs "gave out" similar to multiple previous episodes for which he has been thoroughly w/u for. has been cleared by cardiology for these epsidoes and has PT/OT at home. pt asymptomatic here. his PICC line has small amt of dried blood around it but seems otherwise adequately placed. it draws back and flushes well here in ED. will obtain xray to confirm PICC line still in place and will check basic labs to r/o acute derrangement but unless major abnormalities are found, expect pt should be stable for d/c with appropriate f/u.

## 2020-03-24 NOTE — PROGRESS NOTE ADULT - PROBLEM SELECTOR PROBLEM 8
Essential hypertension
Type 2 diabetes mellitus with other specified complication, with long-term current use of insulin
Type 2 diabetes mellitus with other specified complication, with long-term current use of insulin
Essential hypertension

## 2020-03-24 NOTE — ED ADULT NURSE NOTE - CHIEF COMPLAINT QUOTE
d/c from [facility] 1 hour ago, syncope while walking up the stairs in his home, no head trauma, accidentally pulled out PICC line

## 2020-03-24 NOTE — PROGRESS NOTE ADULT - PROBLEM SELECTOR PLAN 2
-corticosteroid binding globulin low. Discussed with endo attneding DR Goldstein. -Low cortisol level due to low CBG secondary to JEAN cirrhosis.   -Pt to f/u up as outpt w/Dr Goldberg  -Plan discussed with pt/team.  Contact info: [phone] (24/7). pager [phone]

## 2020-03-24 NOTE — PROGRESS NOTE ADULT - SUBJECTIVE AND OBJECTIVE BOX
DIABETES FOLLOW UP NOTE: Saw pt earlier today  INTERVAL HX: 65 y/o M w/ Type 2 DM controlled per A1C 6.4%> on basal/bolus tx PTA. DM c/b neuropathy and retinopathy. Also HTN/ HLD/obesity/BPH/HF and JEAN cirrhosis. Here s/p fall at home, general weakness/syncope found to have scrotal abscess> s/p cystoscopy and transurethral drainage of prostatic abscess (3/15/20) with postop hypotension, altered mental status and bright red blood in bob bag. Pt states feeling better. Tolerating POs with BG values mostly at goal while on present insulin doses. Noted BG at hs >20s last night but pt reports he had an orange 15-20 minutes before BG was tested. Denies eating any food overnight with FBG dropping to 111 this am. No hypoglycemia. Feels tired and sleepy at time of visit. On Lactulose       Review of Systems:  General: As above  Cardiovascular: No chest pain, palpitations  Respiratory: No SOB, no cough  GI: No nausea, vomiting, abdominal pain  Endocrine: no polyuria, polydipsia or S&Sx of hypoglycemia    Allergies    codeine (Anaphylaxis)    Intolerances      MEDICATIONS:      insulin glargine Injectable (LANTUS) 12 Unit(s) SubCutaneous at bedtime  insulin lispro (HumaLOG) corrective regimen sliding scale   SubCutaneous three times a day before meals  insulin lispro (HumaLOG) corrective regimen sliding scale   SubCutaneous at bedtime  insulin lispro Injectable (HumaLOG) 3 Unit(s) SubCutaneous three times a day before meals  lactulose Syrup 30 Gram(s) Oral three times a day      PHYSICAL EXAM:  VITALS: T(C): 36.6 (03-24-20 @ 16:20)  T(F): 97.9 (03-24-20 @ 16:20), Max: 98.2 (03-24-20 @ 08:04)  HR: 77 (03-24-20 @ 16:20) (75 - 80)  BP: 127/68 (03-24-20 @ 16:20) (101/59 - 127/68)  RR:  (16 - 18)  SpO2:  (98% - 100%)  Wt(kg): --  GENERAL: Male laying in bed in NAD  Abdomen: Soft, nontender, non distended  : FC to BSD with dark fran out put  Extremities: Warm, no edema in all 4 exts  NEURO: A&O X3    LABS:  POCT Blood Glucose.: 159 mg/dL (03-24-20 @ 17:41)  POCT Blood Glucose.: 116 mg/dL (03-24-20 @ 12:15)  POCT Blood Glucose.: 111 mg/dL (03-24-20 @ 08:19)  POCT Blood Glucose.: 258 mg/dL (03-23-20 @ 21:45)  POCT Blood Glucose.: 189 mg/dL (03-23-20 @ 17:19)  POCT Blood Glucose.: 132 mg/dL (03-23-20 @ 12:26)  POCT Blood Glucose.: 120 mg/dL (03-23-20 @ 08:34)  POCT Blood Glucose.: 227 mg/dL (03-22-20 @ 21:38)  POCT Blood Glucose.: 210 mg/dL (03-22-20 @ 17:40)  POCT Blood Glucose.: 171 mg/dL (03-22-20 @ 12:21)  POCT Blood Glucose.: 101 mg/dL (03-22-20 @ 08:31)  POCT Blood Glucose.: 200 mg/dL (03-21-20 @ 21:45)                            7.6    4.29  )-----------( 61       ( 23 Mar 2020 16:23 )             23.7       03-23    136  |  105  |  24<H>  ----------------------------<  138<H>  4.1   |  22  |  1.02    EGFR if : 90  EGFR if non : 77    Ca    9.2      03-23  Mg     1.6     03-23  Phos  2.8     03-23    TPro  5.2<L>  /  Alb  2.7<L>  /  TBili  7.1<H>  /  DBili  x   /  AST  33  /  ALT  27  /  AlkPhos  141<H>  03-23      Thyroid Function Tests:  03-11 @ 12:37 TSH 0.97 FreeT4 -- T3 -- Anti TPO -- Anti Thyroglobulin Ab -- TSI --      Hemoglobin A1C, Whole Blood: 6.4 % <H> [4.0 - 5.6] (02-12-20 @ 17:08)

## 2020-03-24 NOTE — PROVIDER CONTACT NOTE (OTHER) - ACTION/TREATMENT ORDERED:
Transfer to ICU
No change in IV access; call to change dressing and still d/c patient to home. Bloody tinged urine is normal at this time. Will continue to monitor patient
MD notified, at bedside. Will review handoff notes and consult urology. Per MD Greene, team and urology aware. No treatment at this time, will continue to monitor

## 2020-03-24 NOTE — PROGRESS NOTE ADULT - PROBLEM SELECTOR PROBLEM 9
Discharge planning issues
Discharge planning issues
Type 2 diabetes mellitus with other specified complication, with long-term current use of insulin

## 2020-03-24 NOTE — DISCHARGE NOTE NURSING/CASE MANAGEMENT/SOCIAL WORK - NSDCFUADDAPPT_GEN_ALL_CORE_FT
Please arrange for follow-up with your primary care physician Dr. Dillan Coto within 1 week. The clinic number is [phone].    Please follow-up with the infectious disease specialist Dr. Préez and with the hepatologist Dr. Medina. Please also follow-up with the urologist.

## 2020-03-24 NOTE — ED PROVIDER NOTE - NSFOLLOWUPINSTRUCTIONS_ED_ALL_ED_FT
Please follow up with your PCP within 7- 10 days.     Please continue with out patient PT and home care as planned.     Return immediately for chest pain, loss of consciousness, new fall/pain.

## 2020-03-24 NOTE — PROGRESS NOTE ADULT - PROBLEM SELECTOR PLAN 1
-test BG AC/HS  -Decrease Lantus dose to 10 units QHS  -C/w Humalof 3 units ac meals for now  -c/w Humalog low correction scale AC and Low HS scale  -Discharge on basal/bolus as noted above. Pt taking higher insulin doses at home but should increase doses only if BG>200s  -f/u outpt w/Dr Goldberg  -Plan discussed with pt/team.  Contact info: [phone] (24/7). pager [phone]

## 2020-03-24 NOTE — PROGRESS NOTE ADULT - PROBLEM SELECTOR PLAN 10
independent
Hepatology team is following patient  Transitions of Care Status:  1.  Name of PCP:  2.  PCP Contacted on Admission: [ ] Y    [ ] N    3.  PCP contacted at Discharge: [ ] Y    [ ] N    [ ] N/A  4.  Post-Discharge Appointment Date and Location:  5.  Summary of Handoff given to PCP:

## 2020-03-24 NOTE — PROGRESS NOTE ADULT - PROBLEM SELECTOR PLAN 5
B12 was high in 12/19  Folate was 10.9 in 12/19  cont folate   Hemoglobin is currently stable
B12 was high in 12/19  Folate was 10.9 in 12/19  cont folate   Hemoglobin is currently stable
MELD-NA 28 3/10/2020  JEAN Cirrhosis: UNOS listed for liver transplant.   Decompensated by esophageal varices, ascites, h/o hepatic encephalopathy and h/o SBP.   -Ascites: Trace on CT abdomen/pelvis from 02/11/20  -Varices: Small non-bleeding EV on EGD from 12/2019.  -HCC: No focal liver lesions on MRI abdomen 12/2019.   - Hold diuretics for now  - Continue with midodrine 30mg TID  - Continue Lactulose and Rifaximin
MELD-NA 28 3/10/2020  JEAN Cirrhosis: UNOS listed for liver transplant.   Decompensated by esophageal varices, ascites, h/o hepatic encephalopathy and h/o SBP.   -Ascites: Trace on CT abdomen/pelvis from 02/11/20  -Varices: Small non-bleeding EV on EGD from 12/2019.  -HCC: No focal liver lesions on MRI abdomen 12/2019.   -PSE: No asterixis.   Holding diuretics  Continue ALbumin for now
MELD-NA 28 3/10/2020  JEAN Cirrhosis: UNOS listed for liver transplant.   Decompensated by esophageal varices, ascites, h/o hepatic encephalopathy and h/o SBP.   -Ascites: Trace on CT abdomen/pelvis from 02/11/20  -Varices: Small non-bleeding EV on EGD from 12/2019.  -HCC: No focal liver lesions on MRI abdomen 12/2019.   - Hold diuretics for now  - Continue with midodrine 30mg TID  - Continue Lactulose and Rifaximin

## 2020-03-24 NOTE — PROGRESS NOTE ADULT - PROBLEM SELECTOR PROBLEM 5
Cirrhosis of liver without ascites, unspecified hepatic cirrhosis type
Macrocytic anemia
Macrocytic anemia
Cirrhosis of liver without ascites, unspecified hepatic cirrhosis type

## 2020-03-24 NOTE — PROGRESS NOTE ADULT - PROBLEM SELECTOR PLAN 8
- Holding antihypertensives in the setting of acute illness
Holding in setting of sepsis and weakness
Pt is on Lantus and Humalog at  home   A1C = 6.4 on 2/2020   cont glucose monitoring   Cont Lantus 12 units at night / Humalog 6 units before meals   Glucose consistent diet   Insulin sliding scale
Pt is on Lantus and Humalog at  home   A1C = 6.4 on 2/2020   cont glucose monitoring   Cont Lantus 12 units at night / Humalog 6 units before meals   Glucose consistent diet   Insulin sliding scale
- Holding antihypertensives in the setting of acute illness

## 2020-03-25 VITALS
HEART RATE: 92 BPM | OXYGEN SATURATION: 99 % | DIASTOLIC BLOOD PRESSURE: 73 MMHG | RESPIRATION RATE: 20 BRPM | SYSTOLIC BLOOD PRESSURE: 143 MMHG | TEMPERATURE: 98 F

## 2020-03-25 LAB
ALBUMIN SERPL ELPH-MCNC: 3.1 G/DL — LOW (ref 3.3–5)
ALP SERPL-CCNC: 190 U/L — HIGH (ref 40–120)
ALT FLD-CCNC: 27 U/L — SIGNIFICANT CHANGE UP (ref 10–45)
ANION GAP SERPL CALC-SCNC: 12 MMOL/L — SIGNIFICANT CHANGE UP (ref 5–17)
AST SERPL-CCNC: 37 U/L — SIGNIFICANT CHANGE UP (ref 10–40)
BILIRUB SERPL-MCNC: 9.8 MG/DL — HIGH (ref 0.2–1.2)
BUN SERPL-MCNC: 24 MG/DL — HIGH (ref 7–23)
CALCIUM SERPL-MCNC: 9.6 MG/DL — SIGNIFICANT CHANGE UP (ref 8.4–10.5)
CHLORIDE SERPL-SCNC: 102 MMOL/L — SIGNIFICANT CHANGE UP (ref 96–108)
CO2 SERPL-SCNC: 21 MMOL/L — LOW (ref 22–31)
CREAT SERPL-MCNC: 1.05 MG/DL — SIGNIFICANT CHANGE UP (ref 0.5–1.3)
GLUCOSE SERPL-MCNC: 162 MG/DL — HIGH (ref 70–99)
HCT VFR BLD CALC: 26.4 % — LOW (ref 39–50)
HGB BLD-MCNC: 8.6 G/DL — LOW (ref 13–17)
MCHC RBC-ENTMCNC: 32.2 PG — SIGNIFICANT CHANGE UP (ref 27–34)
MCHC RBC-ENTMCNC: 32.6 GM/DL — SIGNIFICANT CHANGE UP (ref 32–36)
MCV RBC AUTO: 98.9 FL — SIGNIFICANT CHANGE UP (ref 80–100)
NRBC # BLD: 0 /100 WBCS — SIGNIFICANT CHANGE UP (ref 0–0)
PLATELET # BLD AUTO: 68 K/UL — LOW (ref 150–400)
POTASSIUM SERPL-MCNC: 4.7 MMOL/L — SIGNIFICANT CHANGE UP (ref 3.5–5.3)
POTASSIUM SERPL-SCNC: 4.7 MMOL/L — SIGNIFICANT CHANGE UP (ref 3.5–5.3)
PROT SERPL-MCNC: 5.9 G/DL — LOW (ref 6–8.3)
RBC # BLD: 2.67 M/UL — LOW (ref 4.2–5.8)
RBC # FLD: 25.9 % — HIGH (ref 10.3–14.5)
SODIUM SERPL-SCNC: 135 MMOL/L — SIGNIFICANT CHANGE UP (ref 135–145)
WBC # BLD: 4.59 K/UL — SIGNIFICANT CHANGE UP (ref 3.8–10.5)
WBC # FLD AUTO: 4.59 K/UL — SIGNIFICANT CHANGE UP (ref 3.8–10.5)

## 2020-03-25 PROCEDURE — 71045 X-RAY EXAM CHEST 1 VIEW: CPT | Mod: 26

## 2020-03-25 NOTE — ED ADULT NURSE REASSESSMENT NOTE - NS ED NURSE REASSESS COMMENT FT1
Left arm PICC dressing changed under sterile conditions. PICC noted to be coiled, Arpit Villafana called to bedside to evaluate. Per patient, PICC line is " always coiled." Flushing without resistance, + blood return. Pt denies pain or discomfort. New sterile dressing placed. Arpit FAM aware. Ok for dc per MD. VSS.

## 2020-03-25 NOTE — ED ADULT NURSE REASSESSMENT NOTE - NS ED NURSE REASSESS COMMENT FT1
Pt reports small amount of blood tinged liquid came out when pt attempted to pass stool, pt reports believing blood tinged liquid leaked from MD Erinn Beasley aware and at bedside to evaluate.

## 2020-04-01 ENCOUNTER — APPOINTMENT (OUTPATIENT)
Dept: UROLOGY | Facility: CLINIC | Age: 65
End: 2020-04-01
Payer: MEDICARE

## 2020-04-01 ENCOUNTER — OUTPATIENT (OUTPATIENT)
Dept: OUTPATIENT SERVICES | Facility: HOSPITAL | Age: 65
LOS: 1 days | End: 2020-04-01
Payer: MEDICARE

## 2020-04-01 ENCOUNTER — APPOINTMENT (OUTPATIENT)
Dept: UROLOGY | Facility: CLINIC | Age: 65
End: 2020-04-01

## 2020-04-01 VITALS
HEART RATE: 89 BPM | SYSTOLIC BLOOD PRESSURE: 101 MMHG | DIASTOLIC BLOOD PRESSURE: 54 MMHG | RESPIRATION RATE: 14 BRPM | TEMPERATURE: 98.9 F

## 2020-04-01 DIAGNOSIS — R35.0 FREQUENCY OF MICTURITION: ICD-10-CM

## 2020-04-01 DIAGNOSIS — Z90.49 ACQUIRED ABSENCE OF OTHER SPECIFIED PARTS OF DIGESTIVE TRACT: Chronic | ICD-10-CM

## 2020-04-01 PROCEDURE — 52000 CYSTOURETHROSCOPY: CPT | Mod: 58

## 2020-04-01 PROCEDURE — 96402 CHEMO HORMON ANTINEOPL SQ/IM: CPT

## 2020-04-01 PROCEDURE — 52000 CYSTOURETHROSCOPY: CPT

## 2020-04-02 NOTE — ED PROVIDER NOTE - OBJECTIVE STATEMENT
Patient contacted, video visit scheduled with provider for 2:10 PM.  Patient states she just wants to know if her blood pressure is high enough that it would need treatment?  Advised that 155 SBP typically should be treated, but that provider would like to have more information to make a good clinical decision.  Patient agrees.      Amanda Puentes RN    62yo male who presents with confusion and weakness. wife states pt had to go to the bathroom and went into the kitchen, he went into the bedroom and thought it was somewher else, no vomiting or diarrhea, no fever, cough, no other ocpmalints

## 2020-04-03 DIAGNOSIS — N41.2 ABSCESS OF PROSTATE: ICD-10-CM

## 2020-04-03 NOTE — HISTORY OF PRESENT ILLNESS
[FreeTextEntry1] : seen [facility] with ESBL EColi UTI - imaging noted large prostate abscess\par underwent TUR - noted had eroded into bladder in trigone - open in prostate noting huge cavity. [place] catheter. Case complicated by sepsis and bleeding due to coagulopathy from livr disease.\par sent home with Beasley with plan for cystogram\par called as no drainage via Beasley and change by VNS - urine leaking around

## 2020-04-07 ENCOUNTER — APPOINTMENT (OUTPATIENT)
Dept: UROLOGY | Facility: CLINIC | Age: 65
End: 2020-04-07
Payer: MEDICARE

## 2020-04-07 ENCOUNTER — OUTPATIENT (OUTPATIENT)
Dept: OUTPATIENT SERVICES | Facility: HOSPITAL | Age: 65
LOS: 1 days | End: 2020-04-07
Payer: MEDICARE

## 2020-04-07 DIAGNOSIS — Z90.49 ACQUIRED ABSENCE OF OTHER SPECIFIED PARTS OF DIGESTIVE TRACT: Chronic | ICD-10-CM

## 2020-04-07 PROCEDURE — 51700 IRRIGATION OF BLADDER: CPT | Mod: 58

## 2020-04-07 PROCEDURE — 51700 IRRIGATION OF BLADDER: CPT

## 2020-04-07 NOTE — HISTORY OF PRESENT ILLNESS
[FreeTextEntry1] : seen [facility] with ESBL EColi UTI - imaging noted large prostate abscess\par underwent TUR - noted had eroded into bladder in trigone - open in prostate noting huge cavity. [place] catheter. Case complicated by sepsis and bleeding due to coagulopathy from livr disease.\par sent home with Bob with plan for cystogram\par called as no drainage via Bob and change by VNS - urine leaking around; did cystoscopic repositioning.\par \par came in as no or little drainage via bob since Saturday\par \par on exam bob did not look like della had pulled out very far. tried irrigating with saline - no return. Let balloon down and easily passed to hub with return of urine. Put 30cc in the balloon. irrigated 180cc easily; no blockage.

## 2020-04-08 DIAGNOSIS — N41.2 ABSCESS OF PROSTATE: ICD-10-CM

## 2020-04-10 RX ORDER — WHEELCHAIR
EACH MISCELLANEOUS
Qty: 1 | Refills: 0 | Status: DISCONTINUED | OUTPATIENT
Start: 2020-04-03 | End: 2020-04-10

## 2020-04-14 ENCOUNTER — APPOINTMENT (OUTPATIENT)
Dept: UROLOGY | Facility: CLINIC | Age: 65
End: 2020-04-14
Payer: MEDICARE

## 2020-04-14 ENCOUNTER — OUTPATIENT (OUTPATIENT)
Dept: OUTPATIENT SERVICES | Facility: HOSPITAL | Age: 65
LOS: 1 days | End: 2020-04-14
Payer: MEDICARE

## 2020-04-14 VITALS
RESPIRATION RATE: 14 BRPM | DIASTOLIC BLOOD PRESSURE: 59 MMHG | HEART RATE: 77 BPM | SYSTOLIC BLOOD PRESSURE: 112 MMHG | OXYGEN SATURATION: 99 % | TEMPERATURE: 97.9 F

## 2020-04-14 DIAGNOSIS — Z90.49 ACQUIRED ABSENCE OF OTHER SPECIFIED PARTS OF DIGESTIVE TRACT: Chronic | ICD-10-CM

## 2020-04-14 DIAGNOSIS — R35.0 FREQUENCY OF MICTURITION: ICD-10-CM

## 2020-04-14 PROCEDURE — 51600 INJECTION FOR BLADDER X-RAY: CPT

## 2020-04-14 PROCEDURE — 74455 X-RAY URETHRA/BLADDER: CPT | Mod: 26

## 2020-04-14 PROCEDURE — 51600 INJECTION FOR BLADDER X-RAY: CPT | Mod: 58

## 2020-04-14 PROCEDURE — 74455 X-RAY URETHRA/BLADDER: CPT

## 2020-04-15 DIAGNOSIS — N41.2 ABSCESS OF PROSTATE: ICD-10-CM

## 2020-04-23 ENCOUNTER — APPOINTMENT (OUTPATIENT)
Dept: HEPATOLOGY | Facility: CLINIC | Age: 65
End: 2020-04-23
Payer: MEDICARE

## 2020-04-23 PROCEDURE — 99214 OFFICE O/P EST MOD 30 MIN: CPT | Mod: 95

## 2020-04-24 ENCOUNTER — INPATIENT (INPATIENT)
Facility: HOSPITAL | Age: 65
LOS: 5 days | Discharge: NURSING, REHAB OR LONG-TERM CARE | DRG: 441 | End: 2020-04-30
Attending: HOSPITALIST | Admitting: HOSPITALIST
Payer: MEDICARE

## 2020-04-24 VITALS
TEMPERATURE: 98 F | SYSTOLIC BLOOD PRESSURE: 151 MMHG | HEART RATE: 83 BPM | RESPIRATION RATE: 18 BRPM | OXYGEN SATURATION: 99 % | DIASTOLIC BLOOD PRESSURE: 70 MMHG | WEIGHT: 259.93 LBS | HEIGHT: 73 IN

## 2020-04-24 DIAGNOSIS — Z90.49 ACQUIRED ABSENCE OF OTHER SPECIFIED PARTS OF DIGESTIVE TRACT: Chronic | ICD-10-CM

## 2020-04-24 LAB
ALBUMIN SERPL ELPH-MCNC: 2.9 G/DL — LOW (ref 3.3–5)
ALP SERPL-CCNC: 156 U/L — HIGH (ref 40–120)
ALT FLD-CCNC: 22 U/L — SIGNIFICANT CHANGE UP (ref 10–45)
AMMONIA BLD-MCNC: 150 UMOL/L — HIGH (ref 11–55)
ANION GAP SERPL CALC-SCNC: 12 MMOL/L — SIGNIFICANT CHANGE UP (ref 5–17)
APTT BLD: 40.2 SEC — HIGH (ref 27.5–36.3)
AST SERPL-CCNC: 33 U/L — SIGNIFICANT CHANGE UP (ref 10–40)
BILIRUB DIRECT SERPL-MCNC: 2.2 MG/DL — HIGH (ref 0–0.2)
BILIRUB SERPL-MCNC: 8.5 MG/DL — HIGH (ref 0.2–1.2)
BUN SERPL-MCNC: 19 MG/DL — SIGNIFICANT CHANGE UP (ref 7–23)
CALCIUM SERPL-MCNC: 9.3 MG/DL — SIGNIFICANT CHANGE UP (ref 8.4–10.5)
CHLORIDE SERPL-SCNC: 104 MMOL/L — SIGNIFICANT CHANGE UP (ref 96–108)
CO2 SERPL-SCNC: 21 MMOL/L — LOW (ref 22–31)
CREAT SERPL-MCNC: 1.24 MG/DL — SIGNIFICANT CHANGE UP (ref 0.5–1.3)
GAS PNL BLDV: SIGNIFICANT CHANGE UP
GLUCOSE SERPL-MCNC: 102 MG/DL — HIGH (ref 70–99)
HCT VFR BLD CALC: 23.8 % — LOW (ref 39–50)
HGB BLD-MCNC: 7.8 G/DL — LOW (ref 13–17)
INR BLD: 2.24 RATIO — HIGH (ref 0.88–1.16)
MCHC RBC-ENTMCNC: 32.8 GM/DL — SIGNIFICANT CHANGE UP (ref 32–36)
MCHC RBC-ENTMCNC: 35.5 PG — HIGH (ref 27–34)
MCV RBC AUTO: 108.2 FL — HIGH (ref 80–100)
PLATELET # BLD AUTO: 101 K/UL — LOW (ref 150–400)
POTASSIUM SERPL-MCNC: 4.9 MMOL/L — SIGNIFICANT CHANGE UP (ref 3.5–5.3)
POTASSIUM SERPL-SCNC: 4.9 MMOL/L — SIGNIFICANT CHANGE UP (ref 3.5–5.3)
PROT SERPL-MCNC: 6.4 G/DL — SIGNIFICANT CHANGE UP (ref 6–8.3)
PROTHROM AB SERPL-ACNC: 26.4 SEC — HIGH (ref 10–12.9)
RBC # BLD: 2.2 M/UL — LOW (ref 4.2–5.8)
RBC # FLD: 17.8 % — HIGH (ref 10.3–14.5)
SODIUM SERPL-SCNC: 137 MMOL/L — SIGNIFICANT CHANGE UP (ref 135–145)
WBC # BLD: 5.33 K/UL — SIGNIFICANT CHANGE UP (ref 3.8–10.5)
WBC # FLD AUTO: 5.33 K/UL — SIGNIFICANT CHANGE UP (ref 3.8–10.5)

## 2020-04-24 PROCEDURE — 99285 EMERGENCY DEPT VISIT HI MDM: CPT

## 2020-04-24 PROCEDURE — 93010 ELECTROCARDIOGRAM REPORT: CPT

## 2020-04-24 PROCEDURE — 73630 X-RAY EXAM OF FOOT: CPT | Mod: 26,RT

## 2020-04-24 PROCEDURE — 71045 X-RAY EXAM CHEST 1 VIEW: CPT | Mod: 26

## 2020-04-24 NOTE — ED ADULT NURSE NOTE - NSIMPLEMENTINTERV_GEN_ALL_ED
Implemented All Fall Risk Interventions:  [place] to call system. Call bell, personal items and telephone within reach. Instruct patient to call for assistance. Room bathroom lighting operational. Non-slip footwear when patient is off stretcher. Physically safe environment: no spills, clutter or unnecessary equipment. Stretcher in lowest position, wheels locked, appropriate side rails in place. Provide visual cue, wrist band, yellow gown, etc. Monitor gait and stability. Monitor for mental status changes and reorient to person, place, and time. Review medications for side effects contributing to fall risk. Reinforce activity limits and safety measures with patient and family.

## 2020-04-24 NOTE — ED PROVIDER NOTE - ATTENDING CONTRIBUTION TO CARE
64y M hx of HTN, HLD, DM, HF, duodenal ulcers/AVM, JEAN cirrhosis presents from home w 2 days of confusion. Pt unable to provide any information due to confusion. He is afebrile here, jaundiced, obese male in NAD apart from mild agitation and confusion. Abd obese and non tender to palp. BL LE edema w pitting and ulcer to R heel. Moving all ext w full strength. A&Ox1. Eval for hepatic encephalopathy, infection, electrolyte abn, COVID. TBA. 64y M hx of HTN, HLD, DM, HF, duodenal ulcers/AVM, JEAN cirrhosis presents from home w 2 days of confusion. Pt unable to provide any information due to confusion. He is afebrile here, jaundiced, obese male in NAD apart from mild agitation and confusion. Abd obese and non tender to palp. BL LE edema w pitting and ulcer to R heel. Moving all ext w full strength. A&Ox1. Eval for hepatic encephalopathy, infection, electrolyte abn. Unlikely COVID given no fever, cough, URI sx. TBA.

## 2020-04-24 NOTE — ED PROVIDER NOTE - NS ED ROS FT
ROS:  GENERAL: No fever, no chills  EYES: no change in vision  HEENT: no trouble swallowing, no trouble speaking  CARDIAC: no chest pain  PULMONARY: no cough, no shortness of breath  GI: +abdominal discomfort. no nausea, no vomiting, no diarrhea, no constipation  : No dysuria, no frequency, no change in appearance, or odor of urine  SKIN: no rashes  NEURO: no headache, no weakness, no neck stiffness   MSK: No joint pain    Tray Penn PGY2

## 2020-04-24 NOTE — ED PROVIDER NOTE - CLINICAL SUMMARY MEDICAL DECISION MAKING FREE TEXT BOX
Confusion and abdominal discomfort concerning for SBP vs. hepatic encephalopathy. No cough, fever, chest pain, SOB. No meningismus. Abd benign, low suspicion for acute surgical abdomen, meningitis, or sepsis based on exam/history. Labs, imaging, paracentesis, abx, & admit.

## 2020-04-24 NOTE — ED PROVIDER NOTE - OBJECTIVE STATEMENT
64M with PMH HTN, HLD, DM, HFpEF with mild LV diastolic dysfunction, duodenal ulcer as well as GAVE and duodenal AVM s/p APC (last on 10/11/19), and decompensated JEAN cirrhosis with prior complications including SBP and hepatic encephalopathy currently on list for liver transplant presents to the ER with confusion over the past day. Wife and son deny any other symptoms recently including fever, cough, vomiting. Report chronic unchanged diarrhea. Pt reports mild abdominal discomfort but denies any other symptoms.

## 2020-04-24 NOTE — ED PROVIDER NOTE - PHYSICAL EXAMINATION
Gen: AAOx1 (thinks it's 1999), no acute distress   Head: NCAT  HEENT: EOMI, oral mucosa moist, +scleral icterus  Lung: CTAB, no respiratory distress, no wheezes/rhonchi/rales B/L, speaking in full sentences  CV: RRR, no murmurs, rubs or gallops  Abd: soft, NTND, no guarding, no CVA tenderness  MSK: no visible deformities  Neuro: No focal sensory or motor deficits, normal CN exam   Skin: +right heel ulcer without surrounding cellulitis, crepitus, or fluctuance  Psych: normal affect.     ~Tray Penn PGY2

## 2020-04-25 DIAGNOSIS — I95.1 ORTHOSTATIC HYPOTENSION: ICD-10-CM

## 2020-04-25 DIAGNOSIS — R18.8 OTHER ASCITES: ICD-10-CM

## 2020-04-25 DIAGNOSIS — R60.9 EDEMA, UNSPECIFIED: ICD-10-CM

## 2020-04-25 DIAGNOSIS — E72.20 DISORDER OF UREA CYCLE METABOLISM, UNSPECIFIED: ICD-10-CM

## 2020-04-25 DIAGNOSIS — E11.42 TYPE 2 DIABETES MELLITUS WITH DIABETIC POLYNEUROPATHY: ICD-10-CM

## 2020-04-25 DIAGNOSIS — K74.69 OTHER CIRRHOSIS OF LIVER: ICD-10-CM

## 2020-04-25 DIAGNOSIS — R00.0 TACHYCARDIA, UNSPECIFIED: ICD-10-CM

## 2020-04-25 DIAGNOSIS — K72.90 HEPATIC FAILURE, UNSPECIFIED WITHOUT COMA: ICD-10-CM

## 2020-04-25 DIAGNOSIS — K72.00 ACUTE AND SUBACUTE HEPATIC FAILURE WITHOUT COMA: ICD-10-CM

## 2020-04-25 DIAGNOSIS — G93.41 METABOLIC ENCEPHALOPATHY: ICD-10-CM

## 2020-04-25 DIAGNOSIS — N41.2 ABSCESS OF PROSTATE: ICD-10-CM

## 2020-04-25 DIAGNOSIS — R53.81 OTHER MALAISE: ICD-10-CM

## 2020-04-25 DIAGNOSIS — D64.9 ANEMIA, UNSPECIFIED: ICD-10-CM

## 2020-04-25 DIAGNOSIS — N17.9 ACUTE KIDNEY FAILURE, UNSPECIFIED: ICD-10-CM

## 2020-04-25 LAB
ANION GAP SERPL CALC-SCNC: 12 MMOL/L — SIGNIFICANT CHANGE UP (ref 5–17)
APPEARANCE UR: ABNORMAL
BASOPHILS # BLD AUTO: 0.14 K/UL — SIGNIFICANT CHANGE UP (ref 0–0.2)
BASOPHILS NFR BLD AUTO: 2.6 % — HIGH (ref 0–2)
BILIRUB UR-MCNC: NEGATIVE — SIGNIFICANT CHANGE UP
BUN SERPL-MCNC: 17 MG/DL — SIGNIFICANT CHANGE UP (ref 7–23)
CALCIUM SERPL-MCNC: 9.3 MG/DL — SIGNIFICANT CHANGE UP (ref 8.4–10.5)
CHLORIDE SERPL-SCNC: 106 MMOL/L — SIGNIFICANT CHANGE UP (ref 96–108)
CO2 SERPL-SCNC: 21 MMOL/L — LOW (ref 22–31)
COLOR SPEC: YELLOW — SIGNIFICANT CHANGE UP
CREAT SERPL-MCNC: 1.16 MG/DL — SIGNIFICANT CHANGE UP (ref 0.5–1.3)
DIFF PNL FLD: ABNORMAL
EOSINOPHIL # BLD AUTO: 0.28 K/UL — SIGNIFICANT CHANGE UP (ref 0–0.5)
EOSINOPHIL NFR BLD AUTO: 5.3 % — SIGNIFICANT CHANGE UP (ref 0–6)
GLUCOSE BLDC GLUCOMTR-MCNC: 101 MG/DL — HIGH (ref 70–99)
GLUCOSE BLDC GLUCOMTR-MCNC: 118 MG/DL — HIGH (ref 70–99)
GLUCOSE BLDC GLUCOMTR-MCNC: 119 MG/DL — HIGH (ref 70–99)
GLUCOSE BLDC GLUCOMTR-MCNC: 89 MG/DL — SIGNIFICANT CHANGE UP (ref 70–99)
GLUCOSE SERPL-MCNC: 83 MG/DL — SIGNIFICANT CHANGE UP (ref 70–99)
GLUCOSE UR QL: NEGATIVE — SIGNIFICANT CHANGE UP
KETONES UR-MCNC: NEGATIVE — SIGNIFICANT CHANGE UP
LEUKOCYTE ESTERASE UR-ACNC: ABNORMAL
LYMPHOCYTES # BLD AUTO: 1.27 K/UL — SIGNIFICANT CHANGE UP (ref 1–3.3)
LYMPHOCYTES # BLD AUTO: 23.9 % — SIGNIFICANT CHANGE UP (ref 13–44)
MONOCYTES # BLD AUTO: 0.43 K/UL — SIGNIFICANT CHANGE UP (ref 0–0.9)
MONOCYTES NFR BLD AUTO: 8 % — SIGNIFICANT CHANGE UP (ref 2–14)
NEUTROPHILS # BLD AUTO: 3.21 K/UL — SIGNIFICANT CHANGE UP (ref 1.8–7.4)
NEUTROPHILS NFR BLD AUTO: 60.2 % — SIGNIFICANT CHANGE UP (ref 43–77)
NITRITE UR-MCNC: NEGATIVE — SIGNIFICANT CHANGE UP
PH UR: 6.5 — SIGNIFICANT CHANGE UP (ref 5–8)
POTASSIUM SERPL-MCNC: 4.7 MMOL/L — SIGNIFICANT CHANGE UP (ref 3.5–5.3)
POTASSIUM SERPL-SCNC: 4.7 MMOL/L — SIGNIFICANT CHANGE UP (ref 3.5–5.3)
PROT UR-MCNC: SIGNIFICANT CHANGE UP
SARS-COV-2 RNA SPEC QL NAA+PROBE: SIGNIFICANT CHANGE UP
SODIUM SERPL-SCNC: 139 MMOL/L — SIGNIFICANT CHANGE UP (ref 135–145)
SP GR SPEC: 1.02 — SIGNIFICANT CHANGE UP (ref 1.01–1.02)
UROBILINOGEN FLD QL: ABNORMAL

## 2020-04-25 PROCEDURE — 99024 POSTOP FOLLOW-UP VISIT: CPT

## 2020-04-25 PROCEDURE — 99223 1ST HOSP IP/OBS HIGH 75: CPT | Mod: GC

## 2020-04-25 PROCEDURE — 99233 SBSQ HOSP IP/OBS HIGH 50: CPT

## 2020-04-25 PROCEDURE — 74177 CT ABD & PELVIS W/CONTRAST: CPT | Mod: 26

## 2020-04-25 RX ORDER — ACETAMINOPHEN 500 MG
650 TABLET ORAL EVERY 6 HOURS
Refills: 0 | Status: DISCONTINUED | OUTPATIENT
Start: 2020-04-25 | End: 2020-04-25

## 2020-04-25 RX ORDER — LACTULOSE 10 G/15ML
45 SOLUTION ORAL THREE TIMES A DAY
Refills: 0 | Status: DISCONTINUED | OUTPATIENT
Start: 2020-04-25 | End: 2020-04-25

## 2020-04-25 RX ORDER — GLUCAGON INJECTION, SOLUTION 0.5 MG/.1ML
1 INJECTION, SOLUTION SUBCUTANEOUS ONCE
Refills: 0 | Status: DISCONTINUED | OUTPATIENT
Start: 2020-04-25 | End: 2020-04-30

## 2020-04-25 RX ORDER — CEFTRIAXONE 500 MG/1
1000 INJECTION, POWDER, FOR SOLUTION INTRAMUSCULAR; INTRAVENOUS ONCE
Refills: 0 | Status: COMPLETED | OUTPATIENT
Start: 2020-04-25 | End: 2020-04-25

## 2020-04-25 RX ORDER — MIDODRINE HYDROCHLORIDE 2.5 MG/1
20 TABLET ORAL THREE TIMES A DAY
Refills: 0 | Status: DISCONTINUED | OUTPATIENT
Start: 2020-04-25 | End: 2020-04-30

## 2020-04-25 RX ORDER — ERTAPENEM SODIUM 1 G/1
1000 INJECTION, POWDER, LYOPHILIZED, FOR SOLUTION INTRAMUSCULAR; INTRAVENOUS EVERY 24 HOURS
Refills: 0 | Status: COMPLETED | OUTPATIENT
Start: 2020-04-25 | End: 2020-04-25

## 2020-04-25 RX ORDER — TAMSULOSIN HYDROCHLORIDE 0.4 MG/1
0.4 CAPSULE ORAL AT BEDTIME
Refills: 0 | Status: DISCONTINUED | OUTPATIENT
Start: 2020-04-25 | End: 2020-04-30

## 2020-04-25 RX ORDER — ALBUMIN HUMAN 25 %
100 VIAL (ML) INTRAVENOUS EVERY 8 HOURS
Refills: 0 | Status: COMPLETED | OUTPATIENT
Start: 2020-04-25 | End: 2020-04-26

## 2020-04-25 RX ORDER — INSULIN LISPRO 100/ML
VIAL (ML) SUBCUTANEOUS AT BEDTIME
Refills: 0 | Status: DISCONTINUED | OUTPATIENT
Start: 2020-04-25 | End: 2020-04-30

## 2020-04-25 RX ORDER — LACTULOSE 10 G/15ML
45 SOLUTION ORAL EVERY 4 HOURS
Refills: 0 | Status: DISCONTINUED | OUTPATIENT
Start: 2020-04-25 | End: 2020-04-26

## 2020-04-25 RX ORDER — PANTOPRAZOLE SODIUM 20 MG/1
40 TABLET, DELAYED RELEASE ORAL
Refills: 0 | Status: DISCONTINUED | OUTPATIENT
Start: 2020-04-25 | End: 2020-04-30

## 2020-04-25 RX ORDER — DEXTROSE 50 % IN WATER 50 %
12.5 SYRINGE (ML) INTRAVENOUS ONCE
Refills: 0 | Status: DISCONTINUED | OUTPATIENT
Start: 2020-04-25 | End: 2020-04-30

## 2020-04-25 RX ORDER — DEXTROSE 50 % IN WATER 50 %
25 SYRINGE (ML) INTRAVENOUS ONCE
Refills: 0 | Status: DISCONTINUED | OUTPATIENT
Start: 2020-04-25 | End: 2020-04-30

## 2020-04-25 RX ORDER — INSULIN LISPRO 100/ML
VIAL (ML) SUBCUTANEOUS
Refills: 0 | Status: DISCONTINUED | OUTPATIENT
Start: 2020-04-25 | End: 2020-04-30

## 2020-04-25 RX ORDER — DEXTROSE 50 % IN WATER 50 %
15 SYRINGE (ML) INTRAVENOUS ONCE
Refills: 0 | Status: DISCONTINUED | OUTPATIENT
Start: 2020-04-25 | End: 2020-04-30

## 2020-04-25 RX ORDER — ACETAMINOPHEN 500 MG
500 TABLET ORAL EVERY 6 HOURS
Refills: 0 | Status: DISCONTINUED | OUTPATIENT
Start: 2020-04-25 | End: 2020-04-30

## 2020-04-25 RX ORDER — LACTULOSE 10 G/15ML
20 SOLUTION ORAL ONCE
Refills: 0 | Status: COMPLETED | OUTPATIENT
Start: 2020-04-25 | End: 2020-04-25

## 2020-04-25 RX ORDER — MIDODRINE HYDROCHLORIDE 2.5 MG/1
30 TABLET ORAL THREE TIMES A DAY
Refills: 0 | Status: DISCONTINUED | OUTPATIENT
Start: 2020-04-25 | End: 2020-04-25

## 2020-04-25 RX ORDER — SODIUM CHLORIDE 9 MG/ML
1000 INJECTION, SOLUTION INTRAVENOUS
Refills: 0 | Status: DISCONTINUED | OUTPATIENT
Start: 2020-04-25 | End: 2020-04-30

## 2020-04-25 RX ORDER — ERTAPENEM SODIUM 1 G/1
1000 INJECTION, POWDER, LYOPHILIZED, FOR SOLUTION INTRAMUSCULAR; INTRAVENOUS EVERY 24 HOURS
Refills: 0 | Status: DISCONTINUED | OUTPATIENT
Start: 2020-04-26 | End: 2020-04-26

## 2020-04-25 RX ORDER — FOLIC ACID 0.8 MG
1 TABLET ORAL DAILY
Refills: 0 | Status: DISCONTINUED | OUTPATIENT
Start: 2020-04-25 | End: 2020-04-29

## 2020-04-25 RX ADMIN — LACTULOSE 45 GRAM(S): 10 SOLUTION ORAL at 17:42

## 2020-04-25 RX ADMIN — CEFTRIAXONE 100 MILLIGRAM(S): 500 INJECTION, POWDER, FOR SOLUTION INTRAMUSCULAR; INTRAVENOUS at 00:24

## 2020-04-25 RX ADMIN — CEFTRIAXONE 1000 MILLIGRAM(S): 500 INJECTION, POWDER, FOR SOLUTION INTRAMUSCULAR; INTRAVENOUS at 01:00

## 2020-04-25 RX ADMIN — LACTULOSE 45 GRAM(S): 10 SOLUTION ORAL at 14:04

## 2020-04-25 RX ADMIN — Medication 50 MILLILITER(S): at 16:18

## 2020-04-25 RX ADMIN — LACTULOSE 45 GRAM(S): 10 SOLUTION ORAL at 22:40

## 2020-04-25 RX ADMIN — LACTULOSE 45 GRAM(S): 10 SOLUTION ORAL at 06:05

## 2020-04-25 RX ADMIN — TAMSULOSIN HYDROCHLORIDE 0.4 MILLIGRAM(S): 0.4 CAPSULE ORAL at 22:41

## 2020-04-25 RX ADMIN — LACTULOSE 20 GRAM(S): 10 SOLUTION ORAL at 00:24

## 2020-04-25 RX ADMIN — MIDODRINE HYDROCHLORIDE 20 MILLIGRAM(S): 2.5 TABLET ORAL at 17:41

## 2020-04-25 RX ADMIN — ERTAPENEM SODIUM 120 MILLIGRAM(S): 1 INJECTION, POWDER, LYOPHILIZED, FOR SOLUTION INTRAMUSCULAR; INTRAVENOUS at 05:11

## 2020-04-25 RX ADMIN — PANTOPRAZOLE SODIUM 40 MILLIGRAM(S): 20 TABLET, DELAYED RELEASE ORAL at 06:05

## 2020-04-25 RX ADMIN — Medication 50 MILLILITER(S): at 22:38

## 2020-04-25 RX ADMIN — MIDODRINE HYDROCHLORIDE 30 MILLIGRAM(S): 2.5 TABLET ORAL at 12:04

## 2020-04-25 RX ADMIN — Medication 1 MILLIGRAM(S): at 12:03

## 2020-04-25 RX ADMIN — MIDODRINE HYDROCHLORIDE 30 MILLIGRAM(S): 2.5 TABLET ORAL at 06:05

## 2020-04-25 NOTE — CONSULT NOTE ADULT - SUBJECTIVE AND OBJECTIVE BOX
HPI    Unable to obtain further history given patient's mental status.  History obtained from chart and from H&P    64M w/ hx 14 hospitalizations in the past 2 years for various reasons, DM2, dyslipidemia, obesity, BPH, Orthostatic hypotension, GERD, HTN, peripheral neuropathy, HFpEF with mild LV diastolic dysfunction, weakness, and decompensated JEAN cirrhosis on [facility] transplant list c/b chronic liver failure, SBP, hepatic encephalopathy, anemia of chronic disease, ?MGUS, duodenal ulcer, GAVE/duodenal AVM s/p APC (last on 10/11/19), ESBL ecoli bacteremia and prostate abscess, recent hospitalization for syncope, and prostatic abscess unroofing and 4 weeks ertapenem (last dose 20), p/w few days of confusion.    Patient was TUR'd by Dr. Mayfield for Prostatic abscess on 3/15.  Ultimately finished 4 wks of antibiotics.  Cystogram performed in the office on  with no extravasation and well healed cavity.  Patient has been voiding well since the catheter removal.      PAST MEDICAL & SURGICAL HISTORY:  GIB (gastrointestinal bleeding)  GERD with esophagitis: Gastritis &amp; Non Bleeding Ulcers  Hepatic encephalopathy  Obesity  Fatty liver disease, nonalcoholic  Renal stones: 25 years ago  Hypertension  Neuropathy  Hypercholesteremia  Diabetes  S/P cholecystectomy    MEDICATIONS  (STANDING):  dextrose 5%. 1000 milliLiter(s) (50 mL/Hr) IV Continuous <Continuous>  dextrose 50% Injectable 12.5 Gram(s) IV Push once  dextrose 50% Injectable 25 Gram(s) IV Push once  dextrose 50% Injectable 25 Gram(s) IV Push once  folic acid 1 milliGRAM(s) Oral daily  insulin lispro (HumaLOG) corrective regimen sliding scale  SubCutaneous three times a day before meals  insulin lispro (HumaLOG) corrective regimen sliding scale  SubCutaneous at bedtime  lactulose Syrup 45 Gram(s) Oral three times a day  midodrine. 30 milliGRAM(s) Oral three times a day  pantoprazole    Tablet 40 milliGRAM(s) Oral before breakfast  rifAXIMin 550 milliGRAM(s) Oral two times a day  tamsulosin 0.4 milliGRAM(s) Oral at bedtime    MEDICATIONS  (PRN):  dextrose 40% Gel 15 Gram(s) Oral once PRN Blood Glucose LESS THAN 70 milliGRAM(s)/deciliter  glucagon  Injectable 1 milliGRAM(s) IntraMuscular once PRN Glucose LESS THAN 70 milligrams/deciliter    FAMILY HISTORY:  Family history of type 2 diabetes mellitus  Family history of hypertension  Family history of stomach cancer    Allergies    codeine (Anaphylaxis)    Intolerances    SOCIAL HISTORY: N/A    REVIEW OF SYSTEMS: Otherwise negative as stated in HPI    Physical Exam  Vital signs  T(C): 36.1 (20 @ 07:01), Max: 36.8 (20 @ 22:00)  HR: 100 (20 @ 07:01)  BP: 152/63 (20 @ 07:01)  SpO2: 100% (20 @ 07:01)  Wt(kg): --    Output    OUT:    Voided: 1050 mL  Total OUT: 1050 mL    Total NET: -1050 mL    Gen:  NAD, AAOx1    Pulm:  No respiratory distress, no increased work of breathing	    CV: RRR    GI:  Soft, distended, nontender    :  Uncircumcised phallus  No scrotal erythema or swelling.  Bilateral testicles palpable  No suprapubic tenderness or fullness    LABS:   @ 10:28  WBC --    / Hct --    / SCr 1.16      @ 23:15  WBC 5.33  / Hct 23.8  / SCr 1.24         139  |  106  |  17  ----------------------------<  83  4.7   |  21<L>  |  1.16    Ca    9.3      2020 10:28    TPro  6.4  /  Alb  2.9<L>  /  TBili  8.5<H>  /  DBili  2.2<H>  /  AST  33  /  ALT  22  /  AlkPhos  156<H>      PT/INR - ( 2020 23:15 )   PT: 26.4 sec;   INR: 2.24 ratio      PTT - ( 2020 23:15 )  PTT:40.2 sec    Urinalysis Basic - ( 2020 06:28 )  Color: Yellow / Appearance: Slightly Turbid / S.018 / pH: x  Gluc: x / Ketone: Negative  / Bili: Negative / Urobili: 6 mg/dL   Blood: x / Protein: Trace / Nitrite: Negative   Leuk Esterase: Large / RBC: 50 /HPF /  /HPF   Sq Epi: x / Non Sq Epi: 0 /HPF / Bacteria: Many    Urine Cx: p    RADIOLOGY:    < from: CT Abdomen and Pelvis w/ IV Cont (20 @ 04:57) >  IMPRESSION:     Resolution of prostate abscess seen on CT abdomen pelvis 3/12/2020.    Cirrhosis.    Small perihepatic, perisplenic, and pelvic ascites.    Nonobstructive 0.6 cm left intrarenal calculus.    < end of copied text >

## 2020-04-25 NOTE — H&P ADULT - NSHPREVIEWOFSYSTEMS_GEN_ALL_CORE
REVIEW OF SYSTEMS:  CONSTITUTIONAL: +weakness. No fevers. No chills.   EYES: No visual changes. No eye pain.  ENT: No hearing difficulty. No vertigo. No dysphagia. No sore throat. No Sinusitis/rhinorrhea.   NECK: No pain. No stiffness/rigidity.  CARDIAC: No chest pain. No palpitations. No lightheadedness.  RESPIRATORY: No cough. No SOB. No hemoptysis.  GASTROINTESTINAL: No abdominal pain. No nausea. No vomiting. No hematemesis. No diarrhea. No constipation. No melena. No hematochezia.  GENITOURINARY: No dysuria. No frequency. No hesitancy. No hematuria. No oliguria.  NEUROLOGICAL: No numbness/tingling. No focal weakness. No urinary or fecal incontinence. No headache. +unsteady gait.  BACK: +back pain. No flank pain.  EXTREMITIES: No lower extremity edema. Full ROM. +pain in right foot  SKIN: No rashes. No itching. No other lesions.  ALLERGIC: No lip swelling. No hives.  All other review of systems is negative unless indicated above.  Unless indicated above, unable to assess ROS 2/2

## 2020-04-25 NOTE — PROGRESS NOTE ADULT - SUBJECTIVE AND OBJECTIVE BOX
Medicine Progress Note:    Patient is a 64y old  Male who presents with a chief complaint of confusion (2020 11:35)    SUBJECTIVE / OVERNIGHT EVENTS:  No acute events since admission early this morning, but remains disoriented and mildly agitated. Reports urinary frequency, but without dysuria or gross hematuria and denies any abdominal, suprapubic, or flank pain. No BM yet since admission earlier this morning, but received lactulose 45g x1 around 6am. Currently denying pain in right foot.    ADDITIONAL REVIEW OF SYSTEMS: 10-point ROS reviewed with patient and negative except as per Subjective above.    MEDICATIONS  (STANDING):  albumin human 25% IVPB 100 milliLiter(s) IV Intermittent every 8 hours  dextrose 5%. 1000 milliLiter(s) (50 mL/Hr) IV Continuous <Continuous>  dextrose 50% Injectable 12.5 Gram(s) IV Push once  dextrose 50% Injectable 25 Gram(s) IV Push once  dextrose 50% Injectable 25 Gram(s) IV Push once  folic acid 1 milliGRAM(s) Oral daily  insulin lispro (HumaLOG) corrective regimen sliding scale   SubCutaneous three times a day before meals  insulin lispro (HumaLOG) corrective regimen sliding scale   SubCutaneous at bedtime  lactulose Syrup 45 Gram(s) Oral every 4 hours  midodrine. 20 milliGRAM(s) Oral three times a day  pantoprazole    Tablet 40 milliGRAM(s) Oral before breakfast  rifAXIMin 550 milliGRAM(s) Oral two times a day  tamsulosin 0.4 milliGRAM(s) Oral at bedtime    MEDICATIONS  (PRN):  acetaminophen   Tablet .. 500 milliGRAM(s) Oral every 6 hours PRN Temp greater or equal to 38C (100.4F), Mild Pain (1 - 3)  dextrose 40% Gel 15 Gram(s) Oral once PRN Blood Glucose LESS THAN 70 milliGRAM(s)/deciliter  glucagon  Injectable 1 milliGRAM(s) IntraMuscular once PRN Glucose LESS THAN 70 milligrams/deciliter    CAPILLARY BLOOD GLUCOSE    POCT Blood Glucose.: 101 mg/dL (2020 11:41)  POCT Blood Glucose.: 89 mg/dL (2020 09:32)    I&O's Summary    2020 07:01  -  2020 13:15  --------------------------------------------------------  IN: 240 mL / OUT: 1550 mL / NET: -1310 mL    PHYSICAL EXAM:    Vital Signs Last 24 Hrs  T(C): 36.1 (2020 07:01), Max: 36.8 (2020 22:00)  T(F): 97 (2020 07:01), Max: 98.3 (2020 22:00)  HR: 111 (2020 12:02) (83 - 114)  BP: 130/69 (2020 12:02) (130/69 - 152/63)  BP(mean): --  RR: 20 (2020 12:) (18 - 22)  SpO2: 99% (2020 12:02) (99% - 100%)    Constitutional: Well-developed, well-nourished, comfortable appearing and lying supine in bed, in no acute distress, with normal voice and communication.  Eyes: +Sclera icteric, conjunctiva normal.  ENT: Oropharynx normal with moist mucous membranes and no thrush.  Cardiovascular: Tachycardic, regular rhythm, normal S1 and S2, no JVD.  Respiratory: Normal respiratory rhythm and effort, lungs CTAB.  Back: No focal or midline tenderness.  Gastrointestinal: Normal bowel sounds, obese, non-distended, soft, non-tender to palpation including no suprapubic tenderness, no shifting dullness appreciated.  Extremities: No cyanosis, 2+ pitting edema to BLE below knees.  Skin: Normal skin turgor, +jaundice, +R heel ulcer without purulence/erythema/tenderness, +R 2nd toe linear erythema at DIP with small superficial overlying erosion.  Neurologic: Alert, oriented to person, place, and year but not month, +asterixis.    LABS:                        7.8    5.33  )-----------( 101      ( 2020 23:15 )             23.8     04-    139  |  106  |  17  ----------------------------<  83  4.7   |  21<L>  |  1.16    Ca    9.3      2020 10:28    TPro  6.4  /  Alb  2.9<L>  /  TBili  8.5<H>  /  DBili  2.2<H>  /  AST  33  /  ALT  22  /  AlkPhos  156<H>  04-24    PT/INR - ( 2020 23:15 )   PT: 26.4 sec;   INR: 2.24 ratio       PTT - ( 2020 23:15 )  PTT:40.2 sec    Urinalysis Basic - ( 2020 06:28 )    Color: Yellow / Appearance: Slightly Turbid / S.018 / pH: x  Gluc: x / Ketone: Negative  / Bili: Negative / Urobili: 6 mg/dL   Blood: x / Protein: Trace / Nitrite: Negative   Leuk Esterase: Large / RBC: 50 /HPF /  /HPF   Sq Epi: x / Non Sq Epi: 0 /HPF / Bacteria: Many    COVID-19 PCR: NotDetec (2020 04:24)    RADIOLOGY & ADDITIONAL TESTS:  Imaging from Last 24 Hours: Personally reviewed - CXR, R foot XR, CT abdomen/pelvis with contrast    Electrocardiogram/QTc Interval:    COORDINATION OF CARE:  Care Discussed with Consultants/Other Providers: Y - NP, Urology

## 2020-04-25 NOTE — H&P ADULT - PROBLEM SELECTOR PLAN 2
- acute on chronic liver failure, HE, coagulopathy, anasarca  - treat with rifax, lactulose.  - given vit k last hospitalization  - INR, bili, Cr, albumin are all similar to prior  - needs hepatology consult in AM

## 2020-04-25 NOTE — H&P ADULT - PROBLEM SELECTOR PLAN 3
- f/u CT for ascites  - given ceftriaxone by ED empirically for SBP? - f/u CT for ascites  - given ceftriaxone by ED empirically for SBP?  - hold diuresis, hold ivf

## 2020-04-25 NOTE — PROGRESS NOTE ADULT - PROBLEM SELECTOR PLAN 2
Appreciate Urology input.  Repeat CT (4/25) with resolution of prostatic abscess.  Voiding well after prior indwelling catheter removed as outpatient on 4/14/20.  Urinalysis (4/25) with negative nitrite but +149 wbc and +50 rbc as well as many bacteria, awaiting urine culture (4/25) results.  Blood cultures (4/24, 4/25) sent and pending.  Pending culture results, will continue empiric ertapenem (4/25- ), chosen given prior recent ESBL E. coli UTIs.

## 2020-04-25 NOTE — PROGRESS NOTE ADULT - PROBLEM SELECTOR PLAN 10
Home Lantus 12 units subq qhs currently held, will resume once oral intake improves.  Currently on Lispro ISS only.

## 2020-04-25 NOTE — CONSULT NOTE ADULT - ASSESSMENT
64 year old male with multiple medical comorbidities including DM2, obesity, BPH,  HFpEF with mild LV diastolic dysfunction,  decompensated JEAN cirrhosis on [facility] transplant list c/b chronic liver failure, SBP, hepatic encephalopathy, anemia of chronic disease, ?MGUS, duodenal ulcer, GAVE/duodenal AVM s/p APC (last on 10/11/19), ESBL ecoli bacteremia and prostate abscess, recent hospitalization prostatic abscess unroofing and 4 weeks ertapenem (last dose 4/11/20), p/w few days of confusion.    Repeat CT scan shows resolved abscess.  UA with + LE, no nitrites, 149 WBC.  WBC only 5.3.  Has been voiding without issue, 1L so far.  Awaiting PVR.  Ammonia level 150.  Suspect hepatic encephalopathy with possible superimposition of a UTI but will wait to see UCx.  Given Hx of ESBL E. Coli could possibly be some residual bacteria.  The UA could also be due to the long standing bob.  Given prostate abscess has resolved, do not suspect that that is contributing to his status    -- Await PVR  -- From urologic standpoint if voiding well and PVR is relatively low, does not need a bob catheter.  If a bob catheter needs to be placed for any indication, no urologic contraindication for any provider to attempt given the abscess and prostate have healed  -- Continue accurate I/O's  -- f/u urine culture  -- No acute urologic interventions  -- d/w Dr. Mayfield 64 year old male with multiple medical comorbidities including DM2, obesity, BPH,  HFpEF with mild LV diastolic dysfunction,  decompensated JEAN cirrhosis on [facility] transplant list c/b chronic liver failure, SBP, hepatic encephalopathy, anemia of chronic disease, ?MGUS, duodenal ulcer, GAVE/duodenal AVM s/p APC (last on 10/11/19), ESBL ecoli bacteremia and prostate abscess, recent hospitalization prostatic abscess unroofing and 4 weeks ertapenem (last dose 4/11/20), p/w few days of confusion.    Repeat CT scan shows resolved abscess.  UA with + LE, no nitrites, 149 WBC.  WBC only 5.3.  Has been voiding without issue, 1L so far.  Awaiting PVR.  Ammonia level 150.  Suspect hepatic encephalopathy with possible superimposition of a UTI but will wait to see UCx.  Given Hx of ESBL E. Coli could possibly be some residual bacteria.  The UA could also be due to the long standing bob.  Given prostate abscess has resolved, do not suspect that that is contributing to his status    -- Await PVR  -- From urologic standpoint if voiding well does not need a bob catheter. If PVR is elevated, would just recommend medical therapy with flomax 0.4 mg qHS and possible Proscar.  Would attempt to not instrument with a bob if possible  -- Continue accurate I/O's  -- f/u urine culture  -- No acute urologic interventions  -- d/w Dr. Mayfield & Dr. Rios

## 2020-04-25 NOTE — PROGRESS NOTE ADULT - ATTENDING COMMENTS
I spoke with the patient's wife (Ada) today by phone and updated her regarding his condition and plan of care.    Letitia Mo M.D., Ph.D.  Internal Medicine, Gastroenterology, and Transplant Hepatology  Cell: [phone]

## 2020-04-25 NOTE — PROGRESS NOTE ADULT - PROBLEM SELECTOR PLAN 6
Mild, seen on imaging, insufficient to pursue diagnostic paracentesis currently.  Holding diuretics for now given tachycardia as well as MISA.

## 2020-04-25 NOTE — PROGRESS NOTE ADULT - PROBLEM SELECTOR PLAN 4
Afebrile and with SBP 130s-150s (above his baseline), with no leukocytosis or leukopenia, but nonetheless concerning for infection in this immunocompromised individual.  Continuing ertapenem (4/25- ), as above, for possible UTI.  Follow-up urine and blood cultures, as above.  Has R foot ulcers x2 without obvious appearance of infection and R foot XR was negative, but MRI ordered to rule out deeper tissue infection or OM.  CXR with clear lungs and COVID-19 PCR negative x1.  Will consider Transplant ID involvement pending culture and MRI results.

## 2020-04-25 NOTE — H&P ADULT - PROBLEM SELECTOR PLAN 1
- ddx includes hepatic encephalopathy vs infection related to prostate abscess/fistula.  - no localizing symptoms at this time other than right foot pain  - f/u xray of right foot  - ordered CT of abd/pelvis  - cont rifaximin 550 BID + lactulose 45 TID  - covid swab ordered by ED, but pt is currently refusing the test. - ddx includes hepatic encephalopathy vs infection related to prostate abscess/fistula.  - no localizing symptoms at this time other than right foot pain  - f/u xray of right foot  - ordered CT of abd/pelvis  - cont rifaximin 550 BID + lactulose 45 TID  - covid swab ordered by ED, but pt is currently refusing the test.  - on 1:1  - will give dose of ertapenem as well

## 2020-04-25 NOTE — PROGRESS NOTE ADULT - PROBLEM SELECTOR PLAN 8
He is [facility] wait-listed for liver transplantation at [facility], blood type A, with MELD-Na 25 today, though currently inactive on wait-list due to the COVID-19 pandemic ([facility] liver wait-list temporarily inactivated).

## 2020-04-25 NOTE — H&P ADULT - NSHPSOCIALHISTORY_GEN_ALL_CORE
Patient lives at home with wife and 3 of his 4 children, he is retired as a  .  He is  , he has 3 sons, 1 daughter.  Never smoker. Last drink 5 yrs ago

## 2020-04-25 NOTE — PHYSICAL THERAPY INITIAL EVALUATION ADULT - PERTINENT HX OF CURRENT PROBLEM, REHAB EVAL
64M with PMH HTN, HLD, DM, HFpEF with mild LV diastolic dysfunction, duodenal ulcer as well as GAVE and duodenal AVM s/p APC (last on 10/11/19), and decompensated JEAN cirrhosis with prior complications including SBP and hepatic encephalopathy currently on list for liver transplant presents to the ER with confusion over the past day. X ray foot- soft tissue swelling with no e/o om. no acute fx/dislocation.

## 2020-04-25 NOTE — H&P ADULT - NSHPLABSRESULTS_GEN_ALL_CORE
Personally reviewed labs.   Personally reviewed imaging.   Personally reviewed EKG. NSR, rate 86, . Q wave in III. No TWI or ST change.                          7.8    5.33  )-----------( 101      ( 24 Apr 2020 23:15 )             23.8       04-24    137  |  104  |  19  ----------------------------<  102<H>  4.9   |  21<L>  |  1.24    Ca    9.3      24 Apr 2020 23:15    TPro  6.4  /  Alb  2.9<L>  /  TBili  8.5<H>  /  DBili  2.2<H>  /  AST  33  /  ALT  22  /  AlkPhos  156<H>  04-24            LIVER FUNCTIONS - ( 24 Apr 2020 23:15 )  Alb: 2.9 g/dL / Pro: 6.4 g/dL / ALK PHOS: 156 U/L / ALT: 22 U/L / AST: 33 U/L / GGT: x             PT/INR - ( 24 Apr 2020 23:15 )   PT: 26.4 sec;   INR: 2.24 ratio         PTT - ( 24 Apr 2020 23:15 )  PTT:40.2 sec

## 2020-04-25 NOTE — H&P ADULT - ASSESSMENT
64M c hx 14 hospitalizations in the past 2 years for various reasons, DM2, dyslipidemia, obesity, BPH, Orthostatic hypotension, GERD, HTN, peripheral neuropathy, HFpEF with mild LV diastolic dysfunction, weakness, and decompensated JEAN cirrhosis on [facility] transplant list c/b chronic liver failure, SBP, hepatic encephalopathy, anemia of chronic disease, ?MGUS, duodenal ulcer, GAVE/duodenal AVM s/p APC (last on 10/11/19), ESBL ecoli bacteremia and prostate abscess, recent hospitalization for syncope, and possible prostatic fistula s/p TURP and 4 weeks ertapenem (last dose 4/11/20), pw acute metabolic encephalopathy 2/2 HE vs infection

## 2020-04-25 NOTE — PROGRESS NOTE ADULT - PROBLEM SELECTOR PLAN 1
Venous ammonia level markedly elevated at 151, despite receiving extra doses of lactulose at home and reported adherence with home rifaximin. Currently with [place] grade II hepatic encephalopathy with disorientation and asterixis.  Increasing lactulose to 45g po q4h, then will re-adjust dosing/frequency based on clinical response and stool frequency (initially likely will require aggressive dosing for >4 BMs/day, then can reduce back to maintenance dosing once no longer encephalopathic).  Continuing rifaximin 550 mg po bid.  Continuing with antibiotics, as below, due to concern that infection was the most likely trigger of his recurrent encephalopathy.

## 2020-04-25 NOTE — PHYSICAL THERAPY INITIAL EVALUATION ADULT - PATIENT/FAMILY AGREES WITH PLAN
Subacute Rehab; Pt with limitations in self-care & home management, will benefit from Sub acute rehab prior to D/C home to increase strength, balance and endurance to improve functional mobility to level necessary for safe return home . Spoke with pt's wife./yes

## 2020-04-25 NOTE — H&P ADULT - NSHPPHYSICALEXAM_GEN_ALL_CORE
PHYSICAL EXAM:   GENERAL: Alert. +confused. No acute distress. +obese.  HEAD:  Atraumatic. Normocephalic.  EYES: EOMI. PERRLA. Normal conjunctiva/sclera.  ENT: Neck supple. No JVD. Moist oral mucosa. Not edentulous. No thrush.  LYMPH: Normal supraclavicular/cervical lymph nodes.   CARDIAC: Not tachy, Not peyman. Regular rhythm. Not irregularly irregular. S1. S2. No murmur. No rub. No distant heart sounds.  LUNG/CHEST: CTAB. BS equal bilaterally. No wheezes. No rales. No rhonchi.  ABDOMEN: Soft. No tenderness. No distension. No fluid wave. Normal bowel sounds.  BACK: No midline/vertebral tenderness. No flank tenderness.  VASCULAR: +2 b/l radial or ulnar pulses. Palpable DP pulses.  EXTREMITIES:  No clubbing. No cyanosis. +2 b/l LE pitting edema. Moving all 4.  NEUROLOGY: A&Ox1. Non-focal exam. Cranial nerves intact. Normal speech, but confused. Reduced Sensation in feet  PSYCH: Normal behavior. Flat affect.  SKIN: No jaundice. No erythema.   Vascular Access:     ICU Vital Signs Last 24 Hrs  T(C): 36.5 (25 Apr 2020 02:49), Max: 36.8 (24 Apr 2020 22:00)  T(F): 97.7 (25 Apr 2020 02:49), Max: 98.3 (24 Apr 2020 22:00)  HR: 90 (25 Apr 2020 02:49) (83 - 90)  BP: 134/60 (25 Apr 2020 02:49) (134/60 - 151/70)  BP(mean): --  ABP: --  ABP(mean): --  RR: 20 (25 Apr 2020 02:49) (18 - 20)  SpO2: 100% (25 Apr 2020 02:49) (99% - 100%)      I&O's Summary

## 2020-04-25 NOTE — CONSULT NOTE ADULT - SUBJECTIVE AND OBJECTIVE BOX
Chief Complaint:  Patient is a 64y old  Male who presents with a chief complaint of confusion (25 Apr 2020 04:01)      HPI:  64M with history of multiple hospitalizations, decompensated JEAN cirrhosis on [facility] transplant list SBP, hepatic encephalopathy, anemia of chronic disease, ?MGUS, duodenal ulcer, GAVE/duodenal AVM s/p APC (last on 10/11/19), ESBL ecoli bacteremia and prostate abscess, recent hospitalization for syncope, and possible prostatic fistula s/p TURP and 4 weeks ertapenem (last dose 4/11/20), presenting confusion  Allergies:  codeine (Anaphylaxis)      Home Medications:    Hospital Medications:  folic acid 1 milliGRAM(s) Oral daily  lactulose Syrup 45 Gram(s) Oral three times a day  midodrine. 30 milliGRAM(s) Oral three times a day  pantoprazole    Tablet 40 milliGRAM(s) Oral before breakfast  rifAXIMin 550 milliGRAM(s) Oral two times a day  tamsulosin 0.4 milliGRAM(s) Oral at bedtime      PMHX/PSHX:  GIB (gastrointestinal bleeding)  GERD with esophagitis  Hepatic encephalopathy  Obesity  Fatty liver disease, nonalcoholic  Renal stones  Hypertension  Neuropathy  Hypercholesteremia  Diabetes  S/P cholecystectomy  No significant past surgical history      Family history:  Family history of type 2 diabetes mellitus  Family history of hypertension  Family history of stomach cancer  No pertinent family history in first degree relatives      Social History:     ROS:     General:  No wt loss, fevers, chills, night sweats, fatigue,   Eyes:  Good vision, no reported pain  ENT:  No sore throat, pain, runny nose, dysphagia  CV:  No pain, palpitations, hypo/hypertension  Resp:  No dyspnea, cough, tachypnea, wheezing  GI:  See HPI  :  No pain, bleeding, incontinence, nocturia  Muscle:  No pain, weakness  Neuro:  No weakness, tingling, memory problems  Psych:  No fatigue, insomnia, mood problems, depression  Endocrine:  No polyuria, polydipsia, cold/heat intolerance  Heme:  No petechiae, ecchymosis, easy bruisability  Skin:  No rash, edema      PHYSICAL EXAM:     GENERAL:  Appears stated age, well-groomed, well-nourished, no distress  HEENT:  NC/AT,  conjunctivae clear and pink,  no JVD  CHEST:  Full & symmetric excursion, no increased effort, breath sounds clear  HEART:  Regular rhythm, S1, S2, no murmur/rub/S3/S4, no abdominal bruit, no edema  ABDOMEN:  Soft, non-tender, non-distended, normoactive bowel sounds,  no masses , no hepatosplenomegaly  EXTREMITIES:  no cyanosis,clubbing or edema  SKIN:  No rash/erythema/ecchymoses/petechiae/wounds/abscess/warm/dry  NEURO:  Alert, oriented    Vital Signs:  Vital Signs Last 24 Hrs  T(C): 36.1 (25 Apr 2020 07:01), Max: 36.8 (24 Apr 2020 22:00)  T(F): 97 (25 Apr 2020 07:01), Max: 98.3 (24 Apr 2020 22:00)  HR: 100 (25 Apr 2020 07:01) (83 - 100)  BP: 152/63 (25 Apr 2020 07:01) (134/60 - 152/63)  BP(mean): --  RR: 22 (25 Apr 2020 07:01) (18 - 22)  SpO2: 100% (25 Apr 2020 07:01) (99% - 100%)  Daily Height in cm: 185.42 (24 Apr 2020 22:00)    Daily     LABS:                        7.8    5.33  )-----------( 101      ( 24 Apr 2020 23:15 )             23.8     04-24    137  |  104  |  19  ----------------------------<  102<H>  4.9   |  21<L>  |  1.24    Ca    9.3      24 Apr 2020 23:15    TPro  6.4  /  Alb  2.9<L>  /  TBili  8.5<H>  /  DBili  2.2<H>  /  AST  33  /  ALT  22  /  AlkPhos  156<H>  04-24    LIVER FUNCTIONS - ( 24 Apr 2020 23:15 )  Alb: 2.9 g/dL / Pro: 6.4 g/dL / ALK PHOS: 156 U/L / ALT: 22 U/L / AST: 33 U/L / GGT: x           PT/INR - ( 24 Apr 2020 23:15 )   PT: 26.4 sec;   INR: 2.24 ratio         PTT - ( 24 Apr 2020 23:15 )  PTT:40.2 sec    Amylase Serum--      Lipase serum--       [id number]      Imaging: Chief Complaint:  Patient is a 64y old  Male who presents with a chief complaint of confusion (25 Apr 2020 04:01)      HPI:  64M with history of multiple hospitalizations, decompensated JEAN cirrhosis on [facility] transplant list SBP, hepatic encephalopathy, anemia of chronic disease, ?MGUS, duodenal ulcer, GAVE/duodenal AVM s/p APC (last on 10/11/19), ESBL ecoli bacteremia and prostate abscess, recent hospitalization for syncope, and possible prostatic fistula s/p TURP and 4 weeks ertapenem (last dose 4/11/20), presenting with 2 days of confusion. The patient did not recognize family and was getting agitated and aggressive with his wife. He was walking unsteadily. He recently saw podiatry for a possibly infected R foot wound and he also had his indwelling bob catheter removed recent by his urologist and he has been urinating excessively.   On interview the patient is confused and cannot provide historical information.   The patient's wife attempted to give him lactulose but this did not produce a bowel movement.    Allergies:  codeine (Anaphylaxis)      Home Medications:    Hospital Medications:  folic acid 1 milliGRAM(s) Oral daily  lactulose Syrup 45 Gram(s) Oral three times a day  midodrine. 30 milliGRAM(s) Oral three times a day  pantoprazole    Tablet 40 milliGRAM(s) Oral before breakfast  rifAXIMin 550 milliGRAM(s) Oral two times a day  tamsulosin 0.4 milliGRAM(s) Oral at bedtime      PMHX/PSHX:  GIB (gastrointestinal bleeding)  GERD with esophagitis  Hepatic encephalopathy  Obesity  Fatty liver disease, nonalcoholic  Renal stones  Hypertension  Neuropathy  Hypercholesteremia  Diabetes  S/P cholecystectomy  No significant past surgical history      Family history:  Family history of type 2 diabetes mellitus  Family history of hypertension  Family history of stomach cancer  No pertinent family history in first degree relatives      Social History:     ROS:     cannot provide      PHYSICAL EXAM:     GENERAL:  Appears stated age, well-groomed, well-nourished, no distress  HEENT:  NC/AT,  conjunctivae clear and pink,  no JVD, + Icterus  CHEST:  Full & symmetric excursion, no increased effort, breath sounds clear  HEART:  Regular rhythm, S1, S2, no murmur/rub/S3/S4, no abdominal bruit, no edema  ABDOMEN:  Soft, non-tender, non-distended, normoactive bowel sounds,  no masses , no hepatosplenomegaly  EXTREMITIES:  pitting edema of the bilateral ankles    NEURO:  Disoriented, + Asterixis, lethargic    Vital Signs:  Vital Signs Last 24 Hrs  T(C): 36.1 (25 Apr 2020 07:01), Max: 36.8 (24 Apr 2020 22:00)  T(F): 97 (25 Apr 2020 07:01), Max: 98.3 (24 Apr 2020 22:00)  HR: 100 (25 Apr 2020 07:01) (83 - 100)  BP: 152/63 (25 Apr 2020 07:01) (134/60 - 152/63)  BP(mean): --  RR: 22 (25 Apr 2020 07:01) (18 - 22)  SpO2: 100% (25 Apr 2020 07:01) (99% - 100%)  Daily Height in cm: 185.42 (24 Apr 2020 22:00)    Daily     LABS:                        7.8    5.33  )-----------( 101      ( 24 Apr 2020 23:15 )             23.8     04-24    137  |  104  |  19  ----------------------------<  102<H>  4.9   |  21<L>  |  1.24    Ca    9.3      24 Apr 2020 23:15    TPro  6.4  /  Alb  2.9<L>  /  TBili  8.5<H>  /  DBili  2.2<H>  /  AST  33  /  ALT  22  /  AlkPhos  156<H>  04-24    LIVER FUNCTIONS - ( 24 Apr 2020 23:15 )  Alb: 2.9 g/dL / Pro: 6.4 g/dL / ALK PHOS: 156 U/L / ALT: 22 U/L / AST: 33 U/L / GGT: x           PT/INR - ( 24 Apr 2020 23:15 )   PT: 26.4 sec;   INR: 2.24 ratio         PTT - ( 24 Apr 2020 23:15 )  PTT:40.2 sec    Amylase Serum--      Lipase serum--       [id number]      Imaging:

## 2020-04-25 NOTE — PHYSICAL THERAPY INITIAL EVALUATION ADULT - DISCHARGE DISPOSITION, PT EVAL
Subacute Rehab; Pt with limitations in self-care & home management, will benefit from Sub acute rehab prior to D/C home to increase strength, balance and endurance to improve functional mobility to level necessary for safe return home/rehabilitation facility

## 2020-04-25 NOTE — H&P ADULT - HISTORY OF PRESENT ILLNESS
64M c hx 14 hospitalizations in the past 2 years for various reasons, DM2, dyslipidemia, obesity, BPH, Orthostatic hypotension, GERD, HTN, peripheral neuropathy, HFpEF with mild LV diastolic dysfunction, weakness, and decompensated JEAN cirrhosis on [facility] transplant list c/b chronic liver failure, SBP, hepatic encephalopathy, anemia of chronic disease, ?MGUS, duodenal ulcer, GAVE/duodenal AVM s/p APC (last on 10/11/19), ESBL ecoli bacteremia and prostate abscess, recent hospitalization for syncope, and 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    History obtained from pt and chart. Also spoke with pt's wife over phone. Pt is a poor historian, confused, A&Ox1. Pt reports 2-3 days of confusion. Denies fevers, chills, abd pain, urinary symptoms, bleeding from anywhere, abdominal swelling. Pt confirms that he completed his antibiotics via a PICC line. Per ED, pt did not arrive with a bob, despite pt stating that he still had a bob in. Per wife, pt has only been complaining of pain in his right foot 2/2 pressure ulcer. Pt also complained yesterday morning that he "just didn't feel good". Pt's baseline mental status is A&Ox4. No recent illness in the immediate family. Pt has not had any URI symptoms. Pt has difficulty ambulating, and requires PT assistance.    VS: Tm 98.3, P 83, /70, R 18, 99% RA  In the Ed received ceftriaxone, lactulose 20, constant observation

## 2020-04-25 NOTE — PHYSICAL THERAPY INITIAL EVALUATION ADULT - ADDITIONAL COMMENTS
Patient lives in pvt house with spouse and kids, 4 steps to enter. Patient ambulated with rolling walker and assist. receiving Home PT. Uses w/c. Per wife  advised to use heel pressure relieving shoe as pt. has wound R heel.

## 2020-04-25 NOTE — PHYSICAL THERAPY INITIAL EVALUATION ADULT - TRANSFER TRAINING, PT EVAL
GOAL: Pt will perform sit to/from stand transfers independently with RW within 2-3 weeks, wearing R heel off loading shoe..

## 2020-04-25 NOTE — CONSULT NOTE ADULT - ASSESSMENT
Impression:     1. Acute on chronic hepatic enceophatlopathy, possibly due to UTI. COVID negative. CXR unremarkable. UA positive for blood/WBC/LE. Patient had ESBL E Coli UTI in March and is s/p abx for that.    2. Decompensated cirrhosis due to JEAN. MELD Na 26 consistent with his baseline. He is listed for liver transplant.   -ascites: trace on imaging. On lasix/aldactone 40(alternating 20/40) and 100.  -hepatic encephalopathy on lactulose and rifaximin, now with acute exacerbation as above  -HCC: not seen on CT from this admit.   -varices: EGD in 12/19 showed small varices and GAVE. Hgb currently ~8 which is baseline. Pt not on BB therapy.     3. Orthostasis on midodrine    4. Increase in creatinine to 1.16 from 1.0 at baseline in the setting of infection.     5. Prostate abscess resolved    6. Foot ulcer    Recommendation:   -treat underlying infection, Invanz reasonable choice until culture returns given recent ESBL UTI  -give lactulose Q2 hours until a BM produced and mental status approches baseline, given patient is acutely encephalopathic, then can return to normal dosing  -hold diuretics for today, can administer small amt of albumin  -c/w midodrine  -podiatry consult to r/o infected foot ulcer Impression:     1. Acute on chronic hepatic enceophatlopathy, possibly due to UTI. COVID negative. CXR unremarkable. UA positive for blood/WBC/LE. Patient had ESBL E Coli UTI in March and is s/p abx for that.    2. Decompensated cirrhosis due to JEAN. MELD Na 26 consistent with his baseline. He is listed for liver transplant.   -ascites: trace on imaging. On lasix/aldactone 40(alternating 20/40) and 100.  -hepatic encephalopathy on lactulose and rifaximin, now with acute exacerbation as above  -HCC: not seen on CT from this admit.   -varices: EGD in 12/19 showed small varices and GAVE. Hgb currently ~8 which is baseline. Pt not on BB therapy.     3. Orthostasis on midodrine    4. Increase in creatinine to 1.16 from 1.0 at baseline in the setting of infection.     5. Prostate abscess resolved    6. Foot ulcer    Recommendation:   -treat underlying infection, Invanz reasonable choice until culture returns given recent ESBL UTI  -give lactulose Q2 hours until a BM produced and mental status approches baseline, given patient is acutely encephalopathic, then can return to normal dosing  -c/w rifaximin  -hold diuretics for today, can administer small amt of albumin  -c/w midodrine  -podiatry consult to r/o infected foot ulcer

## 2020-04-25 NOTE — PHYSICAL THERAPY INITIAL EVALUATION ADULT - ASR WT BEARING STATUS EVAL
Right LE/Pt. to off load R heel while weight bearing. ordered R heel off loading shoe Large and X large via .

## 2020-04-26 ENCOUNTER — TRANSCRIPTION ENCOUNTER (OUTPATIENT)
Age: 65
End: 2020-04-26

## 2020-04-26 LAB
A1C WITH ESTIMATED AVERAGE GLUCOSE RESULT: 4.8 % — SIGNIFICANT CHANGE UP (ref 4–5.6)
ALBUMIN SERPL ELPH-MCNC: 2.9 G/DL — LOW (ref 3.3–5)
ALP SERPL-CCNC: 98 U/L — SIGNIFICANT CHANGE UP (ref 40–120)
ALT FLD-CCNC: 18 U/L — SIGNIFICANT CHANGE UP (ref 10–45)
ANION GAP SERPL CALC-SCNC: 13 MMOL/L — SIGNIFICANT CHANGE UP (ref 5–17)
AST SERPL-CCNC: 31 U/L — SIGNIFICANT CHANGE UP (ref 10–40)
BASOPHILS # BLD AUTO: 0.1 K/UL — SIGNIFICANT CHANGE UP (ref 0–0.2)
BASOPHILS NFR BLD AUTO: 1.8 % — SIGNIFICANT CHANGE UP (ref 0–2)
BILIRUB DIRECT SERPL-MCNC: 2.2 MG/DL — HIGH (ref 0–0.2)
BILIRUB SERPL-MCNC: 9.3 MG/DL — HIGH (ref 0.2–1.2)
BLD GP AB SCN SERPL QL: NEGATIVE — SIGNIFICANT CHANGE UP
BUN SERPL-MCNC: 14 MG/DL — SIGNIFICANT CHANGE UP (ref 7–23)
CALCIUM SERPL-MCNC: 9.7 MG/DL — SIGNIFICANT CHANGE UP (ref 8.4–10.5)
CHLORIDE SERPL-SCNC: 107 MMOL/L — SIGNIFICANT CHANGE UP (ref 96–108)
CO2 SERPL-SCNC: 20 MMOL/L — LOW (ref 22–31)
CREAT SERPL-MCNC: 1.14 MG/DL — SIGNIFICANT CHANGE UP (ref 0.5–1.3)
EOSINOPHIL # BLD AUTO: 0.29 K/UL — SIGNIFICANT CHANGE UP (ref 0–0.5)
EOSINOPHIL NFR BLD AUTO: 5.3 % — SIGNIFICANT CHANGE UP (ref 0–6)
ESTIMATED AVERAGE GLUCOSE: 91 MG/DL — SIGNIFICANT CHANGE UP (ref 68–114)
FOLATE SERPL-MCNC: 14.6 NG/ML — SIGNIFICANT CHANGE UP
GLUCOSE BLDC GLUCOMTR-MCNC: 104 MG/DL — HIGH (ref 70–99)
GLUCOSE BLDC GLUCOMTR-MCNC: 144 MG/DL — HIGH (ref 70–99)
GLUCOSE BLDC GLUCOMTR-MCNC: 148 MG/DL — HIGH (ref 70–99)
GLUCOSE BLDC GLUCOMTR-MCNC: 194 MG/DL — HIGH (ref 70–99)
GLUCOSE SERPL-MCNC: 107 MG/DL — HIGH (ref 70–99)
HCT VFR BLD CALC: 19.7 % — CRITICAL LOW (ref 39–50)
HCT VFR BLD CALC: 19.8 % — CRITICAL LOW (ref 39–50)
HCT VFR BLD CALC: 20.7 % — CRITICAL LOW (ref 39–50)
HGB BLD-MCNC: 6.4 G/DL — CRITICAL LOW (ref 13–17)
HGB BLD-MCNC: 6.5 G/DL — CRITICAL LOW (ref 13–17)
HGB BLD-MCNC: 6.7 G/DL — CRITICAL LOW (ref 13–17)
INR BLD: 2.4 RATIO — HIGH (ref 0.88–1.16)
LYMPHOCYTES # BLD AUTO: 1.1 K/UL — SIGNIFICANT CHANGE UP (ref 1–3.3)
LYMPHOCYTES # BLD AUTO: 20.3 % — SIGNIFICANT CHANGE UP (ref 13–44)
MCHC RBC-ENTMCNC: 32.3 GM/DL — SIGNIFICANT CHANGE UP (ref 32–36)
MCHC RBC-ENTMCNC: 32.4 GM/DL — SIGNIFICANT CHANGE UP (ref 32–36)
MCHC RBC-ENTMCNC: 33 GM/DL — SIGNIFICANT CHANGE UP (ref 32–36)
MCHC RBC-ENTMCNC: 34.7 PG — HIGH (ref 27–34)
MCHC RBC-ENTMCNC: 35.4 PG — HIGH (ref 27–34)
MCHC RBC-ENTMCNC: 36.1 PG — HIGH (ref 27–34)
MCV RBC AUTO: 107.3 FL — HIGH (ref 80–100)
MCV RBC AUTO: 109.4 FL — HIGH (ref 80–100)
MCV RBC AUTO: 109.4 FL — HIGH (ref 80–100)
MONOCYTES # BLD AUTO: 0.38 K/UL — SIGNIFICANT CHANGE UP (ref 0–0.9)
MONOCYTES NFR BLD AUTO: 7.1 % — SIGNIFICANT CHANGE UP (ref 2–14)
NEUTROPHILS # BLD AUTO: 3.35 K/UL — SIGNIFICANT CHANGE UP (ref 1.8–7.4)
NEUTROPHILS NFR BLD AUTO: 61.9 % — SIGNIFICANT CHANGE UP (ref 43–77)
NRBC # BLD: 0 /100 WBCS — SIGNIFICANT CHANGE UP (ref 0–0)
NRBC # BLD: 0 /100 WBCS — SIGNIFICANT CHANGE UP (ref 0–0)
PLATELET # BLD AUTO: 63 K/UL — LOW (ref 150–400)
PLATELET # BLD AUTO: 65 K/UL — LOW (ref 150–400)
PLATELET # BLD AUTO: 77 K/UL — LOW (ref 150–400)
POTASSIUM SERPL-MCNC: 4.3 MMOL/L — SIGNIFICANT CHANGE UP (ref 3.5–5.3)
POTASSIUM SERPL-SCNC: 4.3 MMOL/L — SIGNIFICANT CHANGE UP (ref 3.5–5.3)
PROT SERPL-MCNC: 5.7 G/DL — LOW (ref 6–8.3)
PROTHROM AB SERPL-ACNC: 28.4 SEC — HIGH (ref 10–13.1)
RBC # BLD: 1.8 M/UL — LOW (ref 4.2–5.8)
RBC # BLD: 1.81 M/UL — LOW (ref 4.2–5.8)
RBC # BLD: 1.93 M/UL — LOW (ref 4.2–5.8)
RBC # FLD: 17.3 % — HIGH (ref 10.3–14.5)
RBC # FLD: 17.4 % — HIGH (ref 10.3–14.5)
RBC # FLD: 19.6 % — HIGH (ref 10.3–14.5)
RH IG SCN BLD-IMP: POSITIVE — SIGNIFICANT CHANGE UP
SODIUM SERPL-SCNC: 140 MMOL/L — SIGNIFICANT CHANGE UP (ref 135–145)
TSH SERPL-MCNC: 3.26 UIU/ML — SIGNIFICANT CHANGE UP (ref 0.27–4.2)
VIT B12 SERPL-MCNC: 1244 PG/ML — SIGNIFICANT CHANGE UP (ref 232–1245)
WBC # BLD: 4.2 K/UL — SIGNIFICANT CHANGE UP (ref 3.8–10.5)
WBC # BLD: 4.38 K/UL — SIGNIFICANT CHANGE UP (ref 3.8–10.5)
WBC # BLD: 5.42 K/UL — SIGNIFICANT CHANGE UP (ref 3.8–10.5)
WBC # FLD AUTO: 4.2 K/UL — SIGNIFICANT CHANGE UP (ref 3.8–10.5)
WBC # FLD AUTO: 4.38 K/UL — SIGNIFICANT CHANGE UP (ref 3.8–10.5)
WBC # FLD AUTO: 5.42 K/UL — SIGNIFICANT CHANGE UP (ref 3.8–10.5)

## 2020-04-26 PROCEDURE — 73718 MRI LOWER EXTREMITY W/O DYE: CPT | Mod: 26,RT

## 2020-04-26 PROCEDURE — 99233 SBSQ HOSP IP/OBS HIGH 50: CPT

## 2020-04-26 PROCEDURE — 99232 SBSQ HOSP IP/OBS MODERATE 35: CPT | Mod: GC

## 2020-04-26 RX ORDER — LACTULOSE 10 G/15ML
30 SOLUTION ORAL EVERY 6 HOURS
Refills: 0 | Status: DISCONTINUED | OUTPATIENT
Start: 2020-04-26 | End: 2020-04-28

## 2020-04-26 RX ORDER — ERTAPENEM SODIUM 1 G/1
1000 INJECTION, POWDER, LYOPHILIZED, FOR SOLUTION INTRAMUSCULAR; INTRAVENOUS EVERY 24 HOURS
Refills: 0 | Status: DISCONTINUED | OUTPATIENT
Start: 2020-04-27 | End: 2020-04-30

## 2020-04-26 RX ORDER — LACTULOSE 10 G/15ML
45 SOLUTION ORAL EVERY 6 HOURS
Refills: 0 | Status: DISCONTINUED | OUTPATIENT
Start: 2020-04-26 | End: 2020-04-26

## 2020-04-26 RX ADMIN — ERTAPENEM SODIUM 120 MILLIGRAM(S): 1 INJECTION, POWDER, LYOPHILIZED, FOR SOLUTION INTRAMUSCULAR; INTRAVENOUS at 05:10

## 2020-04-26 RX ADMIN — LACTULOSE 30 GRAM(S): 10 SOLUTION ORAL at 23:42

## 2020-04-26 RX ADMIN — Medication 50 MILLILITER(S): at 06:50

## 2020-04-26 RX ADMIN — PANTOPRAZOLE SODIUM 40 MILLIGRAM(S): 20 TABLET, DELAYED RELEASE ORAL at 06:59

## 2020-04-26 RX ADMIN — LACTULOSE 45 GRAM(S): 10 SOLUTION ORAL at 06:49

## 2020-04-26 RX ADMIN — MIDODRINE HYDROCHLORIDE 20 MILLIGRAM(S): 2.5 TABLET ORAL at 12:31

## 2020-04-26 RX ADMIN — MIDODRINE HYDROCHLORIDE 20 MILLIGRAM(S): 2.5 TABLET ORAL at 06:50

## 2020-04-26 RX ADMIN — MIDODRINE HYDROCHLORIDE 20 MILLIGRAM(S): 2.5 TABLET ORAL at 17:23

## 2020-04-26 RX ADMIN — LACTULOSE 45 GRAM(S): 10 SOLUTION ORAL at 12:31

## 2020-04-26 RX ADMIN — Medication 1 MILLIGRAM(S): at 12:32

## 2020-04-26 RX ADMIN — LACTULOSE 30 GRAM(S): 10 SOLUTION ORAL at 17:21

## 2020-04-26 RX ADMIN — TAMSULOSIN HYDROCHLORIDE 0.4 MILLIGRAM(S): 0.4 CAPSULE ORAL at 23:42

## 2020-04-26 RX ADMIN — Medication 1: at 17:22

## 2020-04-26 RX ADMIN — LACTULOSE 45 GRAM(S): 10 SOLUTION ORAL at 02:21

## 2020-04-26 NOTE — PROGRESS NOTE ADULT - SUBJECTIVE AND OBJECTIVE BOX
Patient is a 64y old  Male who presents with a chief complaint of confusion (2020 10:56)      SUBJECTIVE / OVERNIGHT EVENTS:  patient had BM  MEDICATIONS  (STANDING):  dextrose 5%. 1000 milliLiter(s) (50 mL/Hr) IV Continuous <Continuous>  dextrose 50% Injectable 12.5 Gram(s) IV Push once  dextrose 50% Injectable 25 Gram(s) IV Push once  dextrose 50% Injectable 25 Gram(s) IV Push once  ertapenem  IVPB 1000 milliGRAM(s) IV Intermittent every 24 hours  folic acid 1 milliGRAM(s) Oral daily  insulin lispro (HumaLOG) corrective regimen sliding scale   SubCutaneous three times a day before meals  insulin lispro (HumaLOG) corrective regimen sliding scale   SubCutaneous at bedtime  lactulose Syrup 45 Gram(s) Oral every 6 hours  midodrine. 20 milliGRAM(s) Oral three times a day  pantoprazole    Tablet 40 milliGRAM(s) Oral before breakfast  rifAXIMin 550 milliGRAM(s) Oral two times a day  tamsulosin 0.4 milliGRAM(s) Oral at bedtime    MEDICATIONS  (PRN):  acetaminophen   Tablet .. 500 milliGRAM(s) Oral every 6 hours PRN Temp greater or equal to 38C (100.4F), Mild Pain (1 - 3)  dextrose 40% Gel 15 Gram(s) Oral once PRN Blood Glucose LESS THAN 70 milliGRAM(s)/deciliter  glucagon  Injectable 1 milliGRAM(s) IntraMuscular once PRN Glucose LESS THAN 70 milligrams/deciliter              PHYSICAL EXAM:  GENERAL: NAD, well-developed  HEAD:  Atraumatic, Normocephalic  EYES: EOMI, PERRLA, conjunctiva and sclera icteric  NECK: Supple, No JVD  CHEST/LUNG: Clear to auscultation bilaterally; No wheeze  HEART: Regular rate and rhythm; No murmurs, rubs, or gallops  ABDOMEN: Soft, Nontender, Nondistended; Bowel sounds present, no hepatosplenomegaly, no rebound or guarding  EXTREMITIES:  2+ Peripheral Pulses, No clubbing, cyanosis, + pedal edema  PSYCH: AAOx3  NEUROLOGY: non-focal, no asterixis  SKIN: No rashes or lesion    LABS:                        6.5    4.38  )-----------( 65       ( 2020 09:29 )             19.7     04-26    140  |  107  |  14  ----------------------------<  107<H>  4.3   |  20<L>  |  1.14    Ca    9.7      2020 07:27    TPro  5.7<L>  /  Alb  2.9<L>  /  TBili  9.3<H>  /  DBili  2.2<H>  /  AST  31  /  ALT  18  /  AlkPhos  98      LIVER FUNCTIONS - ( 2020 07:27 )  Alb: 2.9 g/dL / Pro: 5.7 g/dL / ALK PHOS: 98 U/L / ALT: 18 U/L / AST: 31 U/L / GGT: x           PT/INR - ( 2020 09:17 )   PT: 28.4 sec;   INR: 2.40 ratio         PTT - ( 2020 23:15 )  PTT:40.2 sec      Urinalysis Basic - ( 2020 06:28 )    Color: Yellow / Appearance: Slightly Turbid / S.018 / pH: x  Gluc: x / Ketone: Negative  / Bili: Negative / Urobili: 6 mg/dL   Blood: x / Protein: Trace / Nitrite: Negative   Leuk Esterase: Large / RBC: 50 /HPF /  /HPF   Sq Epi: x / Non Sq Epi: 0 /HPF / Bacteria: Many        RADIOLOGY & ADDITIONAL TESTS:

## 2020-04-26 NOTE — PROGRESS NOTE ADULT - SUBJECTIVE AND OBJECTIVE BOX
Medicine Progress Note:    Patient is a 64y old  Male who presents with a chief complaint of confusion (2020 11:35)    SUBJECTIVE / OVERNIGHT EVENTS:  No acute events in past 24h.  Received lactulose 5 times yesterday and had 5 BMs in past 24h, with much improved mentation today. Continuing to have multiple BMs this morning.  Hb decreased to 6.4-6.5 this morning, and will receive 1 unit PRBCs transfused. No overt bleeding.  Reports feeling "much better" and asking about going home.  Denies fevers/chills, nausea/vomiting, abdominal pain, dysuria, or hematuria.  Denies right foot pain currently.    ADDITIONAL REVIEW OF SYSTEMS: 10-point ROS reviewed with patient and negative except as per Subjective above.    MEDICATIONS  (STANDING):  dextrose 5%. 1000 milliLiter(s) (50 mL/Hr) IV Continuous <Continuous>  dextrose 50% Injectable 12.5 Gram(s) IV Push once  dextrose 50% Injectable 25 Gram(s) IV Push once  dextrose 50% Injectable 25 Gram(s) IV Push once  folic acid 1 milliGRAM(s) Oral daily  insulin lispro (HumaLOG) corrective regimen sliding scale   SubCutaneous three times a day before meals  insulin lispro (HumaLOG) corrective regimen sliding scale   SubCutaneous at bedtime  lactulose Syrup 45 Gram(s) Oral every 6 hours  midodrine. 20 milliGRAM(s) Oral three times a day  pantoprazole    Tablet 40 milliGRAM(s) Oral before breakfast  rifAXIMin 550 milliGRAM(s) Oral two times a day  tamsulosin 0.4 milliGRAM(s) Oral at bedtime    MEDICATIONS  (PRN):  acetaminophen   Tablet .. 500 milliGRAM(s) Oral every 6 hours PRN Temp greater or equal to 38C (100.4F), Mild Pain (1 - 3)  dextrose 40% Gel 15 Gram(s) Oral once PRN Blood Glucose LESS THAN 70 milliGRAM(s)/deciliter  glucagon  Injectable 1 milliGRAM(s) IntraMuscular once PRN Glucose LESS THAN 70 milligrams/deciliter    CAPILLARY BLOOD GLUCOSE    CAPILLARY BLOOD GLUCOSE    POCT Blood Glucose.: 148 mg/dL (2020 11:40)  POCT Blood Glucose.: 104 mg/dL (2020 08:03)  POCT Blood Glucose.: 119 mg/dL (2020 21:34)  POCT Blood Glucose.: 118 mg/dL (2020 17:09)    I&O's Summary    PHYSICAL EXAM:    Vital Signs Last 24 Hrs  T(C): 36.6 (2020 12:30), Max: 37.1 (2020 07:15)  T(F): 97.9 (2020 12:30), Max: 98.7 (2020 07:15)  HR: 92 (2020 14:57) (86 - 97)  BP: 125/57 (2020 14:57) (122/61 - 154/75)  BP(mean): --  RR: 18 (2020 14:57) (18 - 20)  SpO2: 99% (2020 14:57) (97% - 99%)    Constitutional: Well-developed, well-nourished, comfortable appearing and lying supine in bed, in no acute distress, with normal voice and communication.  Eyes: +Sclera icteric, conjunctiva normal.  ENT: Oropharynx normal with moist mucous membranes and no thrush.  Cardiovascular: Tachycardic, regular rhythm, normal S1 and S2, no JVD.  Respiratory: Normal respiratory rhythm and effort, lungs CTAB.  Back: No focal or midline tenderness.  Gastrointestinal: Normal bowel sounds, obese, minimally distended but with no shifting dullness appreciated, soft, non-tender to palpation including no suprapubic tenderness.  Extremities: No cyanosis, 2+ pitting edema to BLE below knees.  Skin: Normal skin turgor, +jaundice, +R heel ulcer without purulence/erythema/tenderness, +R 2nd toe linear erythema at DIP with small superficial overlying erosion, +mild BLE erythema and hyperpigmentation changes below knees.  Neurologic: Alert, oriented to person, place, year, and month, but not day or day of the week, +mild asterixis.    LABS:             140  |  107  |  14  ----------------------------<  107<H>  4.3   |  20<L>  |  1.14    Ca    9.7      2020 07:27    TPro  5.7<L>  /  Alb  2.9<L>  /  TBili  9.3<H>  /  DBili  2.2<H>  /  AST  31  /  ALT  18  /  AlkPhos  98      CBC Full  -  ( 2020 09:29 )  WBC Count : 4.38 K/uL  RBC Count : 1.80 M/uL  Hemoglobin : 6.5 g/dL  Hematocrit : 19.7 %  Platelet Count - Automated : 65 K/uL  Mean Cell Volume : 109.4 fl  Mean Cell Hemoglobin : 36.1 pg  Mean Cell Hemoglobin Concentration : 33.0 gm/dL  Auto Neutrophil # : x  Auto Lymphocyte # : x  Auto Monocyte # : x  Auto Eosinophil # : x  Auto Basophil # : x  Auto Neutrophil % : x  Auto Lymphocyte % : x  Auto Monocyte % : x  Auto Eosinophil % : x  Auto Basophil % : x    PT/INR - ( 2020 09:17 )   PT: 28.4 sec;   INR: 2.40 ratio         PTT - ( 2020 23:15 )  PTT:40.2 sec    Urinalysis Basic - ( 2020 06:28 )    Color: Yellow / Appearance: Slightly Turbid / S.018 / pH: x  Gluc: x / Ketone: Negative  / Bili: Negative / Urobili: 6 mg/dL   Blood: x / Protein: Trace / Nitrite: Negative   Leuk Esterase: Large / RBC: 50 /HPF /  /HPF   Sq Epi: x / Non Sq Epi: 0 /HPF / Bacteria: Many    COVID-19 PCR: NotDetec (2020 04:24)    RADIOLOGY & ADDITIONAL TESTS:  Imaging from Last 24 Hours:    Electrocardiogram/QTc Interval:    COORDINATION OF CARE:  Care Discussed with Consultants/Other Providers: Y - Hepatology (Dr. Surendra San and Dr. Jorge L Mai), Transplant Surgery (OLY Alcantar and Dr. Vinita Lucio)

## 2020-04-26 NOTE — DISCHARGE NOTE PROVIDER - HOSPITAL COURSE
64M c hx 14 hospitalizations in the past 2 years for various reasons, DM2, dyslipidemia, obesity, BPH, Orthostatic hypotension, GERD, HTN, peripheral neuropathy, HFpEF with mild LV diastolic dysfunction, weakness, and decompensated JEAN cirrhosis on [facility] transplant list c/b chronic liver failure, SBP, hepatic encephalopathy, anemia of chronic disease, ?MGUS, duodenal ulcer, GAVE/duodenal AVM s/p APC (last on 10/11/19), ESBL ecoli bacteremia and prostate abscess, recent hospitalization for syncope, and possible prostatic fistula s/p TURP and 4 weeks ertapenem (last dose 4/11/20), pw acute metabolic encephalopathy 2/2 HE vs infection 64M c hx 14 hospitalizations in the past 2 years for various reasons, DM2, dyslipidemia, obesity, BPH, Orthostatic hypotension, GERD, HTN, peripheral neuropathy, HFpEF with mild LV diastolic dysfunction, weakness, and decompensated JEAN cirrhosis on [facility] transplant list c/b chronic liver failure, SBP, hepatic encephalopathy, anemia of chronic disease, ?MGUS, duodenal ulcer, GAVE/duodenal AVM s/p APC (last on 10/11/19), ESBL ecoli bacteremia and prostate abscess, recent hospitalization for syncope, and 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     Invanz was started for possible ESBL ecoli bacteremia. CT abdomen and pelvis showed cirrhosis. and small perihepatic, perisplenic, and pelvic ascites. 64M c hx 14 hospitalizations in the past 2 years for various reasons, DM2, dyslipidemia, obesity, BPH, Orthostatic hypotension, GERD, HTN, peripheral neuropathy, HFpEF with mild LV diastolic dysfunction, weakness, and decompensated JEAN cirrhosis on [facility] transplant list c/b chronic liver failure, esophageal varices, SBP, hepatic encephalopathy, anemia of chronic disease, ?MGUS, duodenal ulcer, GAVE/duodenal AVM s/p APC (last on 10/11/19), ESBL ecoli bacteremia and prostate abscess, recent hospitalization for syncope, and 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     Invanz was started for possible ESBL ecoli bacteremia. CT abdomen and pelvis showed cirrhosis. and small perihepatic, perisplenic, and pelvic ascites.        # Altered Mental status     2/2 to metabolic encephalopathy     Metabolic encephalopathy suspected to be a combination of Hepatic encephalopathy and infectious etiology     Patient with history of decompensated cirrhosis due to JEAN, listed for liver transplant    Hepatic encephalopathy treated with Rifaximin and Lactulose     4/25 Urine cultures (+) for VRE     4/25 COVID PCR (-)    4/25 CT A/P - Resolution of prostate abscess compared to prior CT on 3/12    Seen by Urology     Seen by Infectious disease    Initially treated with Ceftriaxone, subsequently switched to Ertepenem.     as per ID unclear if VRE is a true pathogen, since encephalopathy improved on Ertapenem    s/p completion of Ertapenem x 5days     To complete 7 day course of Macrobid targeting VRE. (until 5/2)        # Prostatic abscess:     Hx of Prostate abscess     Seen by Urology     4/25 CT confirmed complete resolution    s/p removal of indwelling urinary catheter    4/25 Bcx (-)     4/25 Ucx (+) VRE - treatment as detailed above per ID.             # MISA     baseline Creatinine ~ 1     Initially with mild MISA on admission to peak Creatinine 1.24    Improving renal function after receiving Albumin 25% (received on 4/25 to 4/26)    Diuresis with Albumin as per Renal team.         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    Patient to undergo a non emergent repeat EGD as o/p         # DM2:     Type 2 DM with diabetic polyneuropathy     DM treated with ISS 64M c hx 14 hospitalizations in the past 2 years for various reasons, DM2, dyslipidemia, obesity, BPH, Orthostatic hypotension, GERD, HTN, peripheral neuropathy, HFpEF with mild LV diastolic dysfunction, weakness, and decompensated JEAN cirrhosis on [facility] transplant list c/b chronic liver failure, esophageal varices, SBP, hepatic encephalopathy, anemia of chronic disease, ?MGUS, duodenal ulcer, GAVE/duodenal AVM s/p APC (last on 10/11/19), ESBL ecoli bacteremia and prostate abscess, recent hospitalization for syncope, and 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     Invanz was started for possible ESBL ecoli bacteremia. CT abdomen and pelvis showed cirrhosis. and small perihepatic, perisplenic, and pelvic ascites.        # Altered Mental status     2/2 to metabolic encephalopathy     Metabolic encephalopathy suspected to be a combination of Hepatic encephalopathy and infectious etiology     Patient with history of decompensated cirrhosis due to JEAN, listed for liver transplant    Hepatic encephalopathy treated with Rifaximin and Lactulose     4/25 Urine cultures (+) for VRE     4/25 COVID PCR (-)    4/25 CT A/P - Resolution of prostate abscess compared to prior CT on 3/12    Seen by Urology     Seen by Infectious disease    Initially treated with Ceftriaxone, subsequently switched to Ertepenem.     as per ID unclear if VRE is a true pathogen, since encephalopathy improved on Ertapenem    s/p completion of Ertapenem x 5days     To complete 7 day course of Macrobid targeting VRE. (until 5/2)        # Prostatic abscess:     Hx of Prostate abscess     Seen by Urology     4/25 CT confirmed complete resolution    s/p removal of indwelling urinary catheter    4/25 Bcx (-)     4/25 Ucx (+) VRE - treatment as detailed above per ID.             # MISA     baseline Creatinine ~ 1     Initially with mild MISA on admission to peak Creatinine 1.24    Improving renal function after receiving Albumin 25% (received on 4/25 to 4/26)    Diuresis with Albumin as per Renal team.         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 Decreased insulin regimen upon discharge to basal and holding bolus. Titrate as needed

## 2020-04-26 NOTE — DISCHARGE NOTE PROVIDER - PROVIDER TOKENS
PROVIDER:[TOKEN:[[id number]],FOLLOWUP:[2 weeks],ESTABLISHEDPATIENT:[T]],PROVIDER:[TOKEN:[[id number]],FOLLOWUP:[2 weeks],ESTABLISHEDPATIENT:[T]]

## 2020-04-26 NOTE — PROGRESS NOTE ADULT - PROBLEM SELECTOR PLAN 10
Home Lantus 12 units subq qhs currently held and fingersticks all within goal range.  HbA1c 4.8%, suggesting may be able to decrease home insulin regimen.  Currently on Lispro ISS only.

## 2020-04-26 NOTE — PROGRESS NOTE ADULT - PROBLEM SELECTOR PLAN 2
Appreciate Urology input.  Repeat CT (4/25) with resolution of prostatic abscess.  Voiding well after prior indwelling catheter removed as outpatient on 4/14/20, awaiting PVR today but unless PVR elevated does not need catheter replaced per Urology.  Urinalysis (4/25) with negative nitrite but +149 wbc and +50 rbc as well as many bacteria.  Urine culture (4/25) with >100k gram positive organisms, ?Enterococcus, though previously had ESBL E. coli. Will follow-up speciation/sensitivites.  Blood cultures (4/25 x2) with NGTD.  Pending urine culture speciation/sensitivities, will continue empiric ertapenem (4/25- ), chosen given prior recent ESBL E. coli UTIs.  Transplant ID consulted, will follow-up their recommendations.

## 2020-04-26 NOTE — DISCHARGE NOTE PROVIDER - CARE PROVIDERS DIRECT ADDRESSES
[email or web address]

## 2020-04-26 NOTE — PROGRESS NOTE ADULT - PROBLEM SELECTOR PLAN 8
He is [facility] wait-listed for liver transplantation at [facility], blood type A, with MELD-Na 26 today, though currently inactive on wait-list due to the COVID-19 pandemic ([facility] liver wait-list temporarily inactivated).

## 2020-04-26 NOTE — PROGRESS NOTE ADULT - PROBLEM SELECTOR PLAN 4
Resolved after being started on antibiotics and with IV albumin.  Remains afebrile and with no leukocytossi or leukopenia.  Continuing ertapenem (4/25- ), as above, for possible UTI.  Follow-up urine and blood cultures, as above.  Has R foot ulcers x2 without obvious appearance of infection and R foot XR was negative, but MRI ordered to rule out deeper tissue infection or OM.  CXR with clear lungs and COVID-19 PCR negative x1.  Transplant ID consulted, as above, will follow-up recs.

## 2020-04-26 NOTE — PROGRESS NOTE ADULT - ATTENDING COMMENTS
I spoke with the patient's wife (Ada) today by phone and updated her regarding his condition and plan of care.    Letitia Mo M.D., Ph.D.  Internal Medicine, Gastroenterology, and Transplant Hepatology  Cell: [phone] Disposition: Family has expressed concerns that Mr. Segundo has become increasingly debilitated over the past few months (after initially having significant improvement when he went to rehab previously) and has been requiring assistance with ADLs. They are struggling to provide the assistance he needs at home due to his brittle encephalopathy as well as large body habitus and are concerned about the safety of him remaining at home. Will consult PT but anticipate that he may need discharge to rehab again for re-conditioning, once medically stable for discharge.    I attempted to reach the patient's wife (Ada) today but there was no answer, so I instead spoke with and updated one of the patient's sons (Jeff), who will relay what we discussed to his mother.    Letitia Mo M.D., Ph.D.  Internal Medicine, Gastroenterology, and Transplant Hepatology  Cell: [phone] Disposition: Family has expressed concerns that Mr. Segundo has become increasingly debilitated over the past few months (after initially having significant improvement when he went to rehab previously) and has been requiring assistance with ADLs. They are struggling to provide the assistance he needs at home due to his brittle encephalopathy as well as large body habitus and are concerned about the safety of him remaining at home. Seen by PT who are recommending [facility] placement, once medically stable for discharge.    I attempted to reach the patient's wife (Ada) today but there was no answer, so I instead spoke with and updated one of the patient's sons (Jeff), who will relay what we discussed to his mother.    Letitia Mo M.D., Ph.D.  Internal Medicine, Gastroenterology, and Transplant Hepatology  Cell: [phone]

## 2020-04-26 NOTE — DISCHARGE NOTE PROVIDER - CARE PROVIDER_API CALL
Marcelino Mayfield)  Urology  [address] [facility], [place] [address]  Phone: [phone]  Fax: [phone]  Established Patient  Follow Up Time: 2 weeks    Yomi Medina)  Gastroenterology; Transplant Hepatology  [address] [place] [address]  Phone: [phone]  Fax: [phone]  Established Patient  Follow Up Time: 2 weeks

## 2020-04-26 NOTE — DISCHARGE NOTE PROVIDER - NSDCMRMEDTOKEN_GEN_ALL_CORE_FT
folic acid 1 mg oral tablet: 1 tab(s) orally once a day  insulin glargine: 12 unit(s) subcutaneous once a day (at bedtime)  insulin lispro: 3 unit(s) subcutaneous 3 times a day (before meals)  lactulose 10 g/15 mL oral syrup: 45 milliliter(s) orally 3 times a day  midodrine 10 mg oral tablet: 3 tab(s) orally 3 times a day  pantoprazole 40 mg oral delayed release tablet: 1 tab(s) orally once a day (before a meal)  rifAXIMin 550 mg oral tablet: 1 tab(s) orally 2 times a day  tamsulosin 0.4 mg oral capsule: 1 cap(s) orally once a day (at bedtime) Aldactone 100 mg oral tablet: 1 tab(s) orally once a day  folic acid 1 mg oral tablet: 1 tab(s) orally once a day  lactulose 10 g/15 mL oral syrup: 30 milliliter(s) orally every 8 hours. Titrate for 3-4 BM daily   Lantus 100 units/mL subcutaneous solution: 4 unit(s) subcutaneous once a day (at bedtime)  midodrine 10 mg oral tablet: 2 tab(s) orally 3 times a day  nitrofurantoin macrocrystals-monohydrate 100 mg oral capsule: 1 cap(s) orally 2 times a day (with meals) until 5/3   pantoprazole 40 mg oral delayed release tablet: 1 tab(s) orally once a day (before a meal)  rifAXIMin 550 mg oral tablet: 1 tab(s) orally 2 times a day  silver sulfADIAZINE 1% topical cream: Apply topically to affected area every other day to right heel ulcer   tamsulosin 0.4 mg oral capsule: 1 cap(s) orally once a day (at bedtime) Aldactone 100 mg oral tablet: 1 tab(s) orally once a day  folic acid 1 mg oral tablet: 1 tab(s) orally once a day  lactulose 10 g/15 mL oral syrup: 30 milliliter(s) orally every 8 hours. Titrate for 3-4 BM daily   Lantus 100 units/mL subcutaneous solution: 4 unit(s) subcutaneous once a day (at bedtime)  Lasix 20 mg oral tablet: 1 tab(s) orally once a day  midodrine 10 mg oral tablet: 2 tab(s) orally 3 times a day  nitrofurantoin macrocrystals-monohydrate 100 mg oral capsule: 1 cap(s) orally 2 times a day (with meals) until 5/3   pantoprazole 40 mg oral delayed release tablet: 1 tab(s) orally once a day (before a meal)  rifAXIMin 550 mg oral tablet: 1 tab(s) orally 2 times a day  silver sulfADIAZINE 1% topical cream: Apply topically to affected area every other day to right heel ulcer   tamsulosin 0.4 mg oral capsule: 1 cap(s) orally once a day (at bedtime)

## 2020-04-26 NOTE — PROGRESS NOTE ADULT - PROBLEM SELECTOR PLAN 6
Mild, seen on imaging, insufficient to pursue diagnostic paracentesis currently.  Home diuretic regimen clarified (see below).  Holding diuretics for now but tentatively will resume low-dose diuretics tomorrow if renal function stable/improving.

## 2020-04-26 NOTE — DISCHARGE NOTE PROVIDER - NSDCCPCAREPLAN_GEN_ALL_CORE_FT
PRINCIPAL DISCHARGE DIAGNOSIS  Diagnosis: Hyperammonemia  Assessment and Plan of Treatment:       SECONDARY DISCHARGE DIAGNOSES  Diagnosis: Hepatic encephalopathy  Assessment and Plan of Treatment: PRINCIPAL DISCHARGE DIAGNOSIS  Diagnosis: Hepatic encephalopathy  Assessment and Plan of Treatment: Continue lactulose as ordered. Titrate for 3-4 BM daily      SECONDARY DISCHARGE DIAGNOSES  Diagnosis: Type 2 diabetes mellitus with diabetic polyneuropathy, with long-term current use of insulin  Assessment and Plan of Treatment: Continue basal insulin as ordered. Titrate as needed. Held premeal insulin - to restart if needed. Monitor blood glucose before meals and at bedtime PRINCIPAL DISCHARGE DIAGNOSIS  Diagnosis: Hepatic encephalopathy  Assessment and Plan of Treatment: Continue lactulose as ordered. Titrate for 3-4 BM daily      SECONDARY DISCHARGE DIAGNOSES  Diagnosis: Diabetic ulcer of right foot  Assessment and Plan of Treatment: Seen by podiatry while inpatient. Recommend continued use of z flow offloading boots, continued use of heel offloading sx shoe for ambulation, Follow up with current DPM once d/c for continued wound care    Diagnosis: Type 2 diabetes mellitus with diabetic polyneuropathy, with long-term current use of insulin  Assessment and Plan of Treatment: Continue basal insulin as ordered. Titrate as needed. Held premeal insulin - to restart if needed. Monitor blood glucose before meals and at bedtime

## 2020-04-26 NOTE — PROGRESS NOTE ADULT - SUBJECTIVE AND OBJECTIVE BOX
Subjective  No overnight events. This AM, Hct drop to 19.7, will be transfused 1u pRBC. Otherwise feeling well - no problems w/urination, no sensation of incomplete emptying.    Objective    Vital signs  T(F): , Max: 98.7 (04-26-20 @ 07:15)  HR: 93 (04-26-20 @ 07:15)  BP: 124/54 (04-26-20 @ 07:15)  SpO2: 97% (04-26-20 @ 07:15)  Wt(kg): --    Output     04-25 @ 07:01  -  04-26 @ 07:00  --------------------------------------------------------  IN: 240 mL / OUT: 2000 mL / NET: -1760 mL    04-26 @ 07:01  -  04-26 @ 10:56  --------------------------------------------------------  IN: 457 mL / OUT: 0 mL / NET: 457 mL        Gen: NAD  Abd: soft, NT, nd    Labs      04-26 @ 09:29    WBC 4.38  / Hct 19.7  / SCr --       04-26 @ 07:27    WBC 5.42  / Hct 19.8  / SCr 1.14       Urine Cx: Culture - Urine (04.25.20 @ 07:31)    Specimen Source: .Urine Clean Catch (Midstream)    Culture Results:   >100,000 CFU/ml Gram positive organisms    Imaging

## 2020-04-26 NOTE — DISCHARGE NOTE PROVIDER - NSDCFUSCHEDAPPT_GEN_ALL_CORE_FT
JESSICA MARTIN ; 04/28/2020 ; [facility] JESSICA García ; 05/05/2020 ; NPP Urology [address] [place] [address] JESSICA MARTIN ; 04/28/2020 ; [facility] JESSICA García ; 05/05/2020 ; NPP Urology [address] [place] [address] JESSICA MARTIN ; 05/05/2020 ; NPP Urology [address] [place] [address] JESSICA MARTIN ; 05/05/2020 ; [facility] Urology [address] [place] [address] JESSICA MARTIN ; 05/05/2020 ; NPP Urology [address] [place] [address] JESSICA MARTIN ; 05/05/2020 ; [facility] Urology [address] [place] [address] JESSICA MARTIN ; 05/05/2020 ; [facility] Urology [address] [place] [address] JESSICA MARTIN ; 05/05/2020 ; NPP Urology [address] [place] [address] JESSICA MARTIN ; 05/05/2020 ; NPP Urology [address] [place] [address]

## 2020-04-26 NOTE — PROGRESS NOTE ADULT - PROBLEM SELECTOR PLAN 1
Clinically improving after resuming antibiotics and with increased lactulose.  Still with [place] grade II HE this morning, but less disoriented, more calm and conversant, and with only faint asterixis on exam, all improved from admission though not yet at his baseline.  Admission venous ammonia level elevated at 151 but will not trend as does not correlate well with clinical HE.  Decreased lactulose from 45g po q4h to 30g po q6h today, and will continue to adjust dosing as needed based on clinical response and stool frequency (will require aggressive dosing for >4 BMs/day until HE resolved, then can reduce back to maintenance dosing with goal of maintaining 3-4 BMs/day).  Continuing rifaximin 550 mg po bid.  Continuing with antibiotics, as below, due to concern that infection was the most likely trigger of his recurrent encephalopathy.  Portal veins appear patent on CT abdomen with IV contrast (4/25). No indication for repeating RUQ US at this time.

## 2020-04-27 DIAGNOSIS — L97.519 NON-PRESSURE CHRONIC ULCER OF OTHER PART OF RIGHT FOOT WITH UNSPECIFIED SEVERITY: ICD-10-CM

## 2020-04-27 LAB
-  AMPICILLIN: SIGNIFICANT CHANGE UP
-  CIPROFLOXACIN: SIGNIFICANT CHANGE UP
-  DAPTOMYCIN: SIGNIFICANT CHANGE UP
-  LEVOFLOXACIN: SIGNIFICANT CHANGE UP
-  LINEZOLID: SIGNIFICANT CHANGE UP
-  NITROFURANTOIN: SIGNIFICANT CHANGE UP
-  TETRACYCLINE: SIGNIFICANT CHANGE UP
-  VANCOMYCIN: SIGNIFICANT CHANGE UP
ALBUMIN SERPL ELPH-MCNC: 2.7 G/DL — LOW (ref 3.3–5)
ALBUMIN SERPL ELPH-MCNC: 2.7 G/DL — LOW (ref 3.3–5)
ALP SERPL-CCNC: 100 U/L — SIGNIFICANT CHANGE UP (ref 40–120)
ALP SERPL-CCNC: 103 U/L — SIGNIFICANT CHANGE UP (ref 40–120)
ALT FLD-CCNC: 14 U/L — SIGNIFICANT CHANGE UP (ref 10–45)
ALT FLD-CCNC: 17 U/L — SIGNIFICANT CHANGE UP (ref 10–45)
ANION GAP SERPL CALC-SCNC: 10 MMOL/L — SIGNIFICANT CHANGE UP (ref 5–17)
ANION GAP SERPL CALC-SCNC: 11 MMOL/L — SIGNIFICANT CHANGE UP (ref 5–17)
AST SERPL-CCNC: 27 U/L — SIGNIFICANT CHANGE UP (ref 10–40)
AST SERPL-CCNC: 29 U/L — SIGNIFICANT CHANGE UP (ref 10–40)
BASOPHILS # BLD AUTO: 0.04 K/UL — SIGNIFICANT CHANGE UP (ref 0–0.2)
BASOPHILS NFR BLD AUTO: 0.9 % — SIGNIFICANT CHANGE UP (ref 0–2)
BILIRUB DIRECT SERPL-MCNC: 1.9 MG/DL — HIGH (ref 0–0.2)
BILIRUB SERPL-MCNC: 8.7 MG/DL — HIGH (ref 0.2–1.2)
BILIRUB SERPL-MCNC: 8.9 MG/DL — HIGH (ref 0.2–1.2)
BUN SERPL-MCNC: 12 MG/DL — SIGNIFICANT CHANGE UP (ref 7–23)
BUN SERPL-MCNC: 12 MG/DL — SIGNIFICANT CHANGE UP (ref 7–23)
CALCIUM SERPL-MCNC: 9.3 MG/DL — SIGNIFICANT CHANGE UP (ref 8.4–10.5)
CALCIUM SERPL-MCNC: 9.3 MG/DL — SIGNIFICANT CHANGE UP (ref 8.4–10.5)
CHLORIDE SERPL-SCNC: 107 MMOL/L — SIGNIFICANT CHANGE UP (ref 96–108)
CHLORIDE SERPL-SCNC: 107 MMOL/L — SIGNIFICANT CHANGE UP (ref 96–108)
CO2 SERPL-SCNC: 21 MMOL/L — LOW (ref 22–31)
CO2 SERPL-SCNC: 21 MMOL/L — LOW (ref 22–31)
CREAT SERPL-MCNC: 1.1 MG/DL — SIGNIFICANT CHANGE UP (ref 0.5–1.3)
CREAT SERPL-MCNC: 1.12 MG/DL — SIGNIFICANT CHANGE UP (ref 0.5–1.3)
CULTURE RESULTS: SIGNIFICANT CHANGE UP
EOSINOPHIL # BLD AUTO: 0.29 K/UL — SIGNIFICANT CHANGE UP (ref 0–0.5)
EOSINOPHIL NFR BLD AUTO: 6.3 % — HIGH (ref 0–6)
GLUCOSE BLDC GLUCOMTR-MCNC: 122 MG/DL — HIGH (ref 70–99)
GLUCOSE BLDC GLUCOMTR-MCNC: 153 MG/DL — HIGH (ref 70–99)
GLUCOSE BLDC GLUCOMTR-MCNC: 180 MG/DL — HIGH (ref 70–99)
GLUCOSE BLDC GLUCOMTR-MCNC: 201 MG/DL — HIGH (ref 70–99)
GLUCOSE SERPL-MCNC: 122 MG/DL — HIGH (ref 70–99)
GLUCOSE SERPL-MCNC: 123 MG/DL — HIGH (ref 70–99)
HCT VFR BLD CALC: 22.3 % — LOW (ref 39–50)
HGB BLD-MCNC: 7.3 G/DL — LOW (ref 13–17)
IMM GRANULOCYTES NFR BLD AUTO: 0.4 % — SIGNIFICANT CHANGE UP (ref 0–1.5)
INR BLD: 2.45 RATIO — HIGH (ref 0.88–1.16)
LYMPHOCYTES # BLD AUTO: 1.54 K/UL — SIGNIFICANT CHANGE UP (ref 1–3.3)
LYMPHOCYTES # BLD AUTO: 33.3 % — SIGNIFICANT CHANGE UP (ref 13–44)
MCHC RBC-ENTMCNC: 32.7 GM/DL — SIGNIFICANT CHANGE UP (ref 32–36)
MCHC RBC-ENTMCNC: 34.8 PG — HIGH (ref 27–34)
MCV RBC AUTO: 106.2 FL — HIGH (ref 80–100)
METHOD TYPE: SIGNIFICANT CHANGE UP
MONOCYTES # BLD AUTO: 0.51 K/UL — SIGNIFICANT CHANGE UP (ref 0–0.9)
MONOCYTES NFR BLD AUTO: 11 % — SIGNIFICANT CHANGE UP (ref 2–14)
NEUTROPHILS # BLD AUTO: 2.23 K/UL — SIGNIFICANT CHANGE UP (ref 1.8–7.4)
NEUTROPHILS NFR BLD AUTO: 48.1 % — SIGNIFICANT CHANGE UP (ref 43–77)
NRBC # BLD: 0 /100 WBCS — SIGNIFICANT CHANGE UP (ref 0–0)
ORGANISM # SPEC MICROSCOPIC CNT: SIGNIFICANT CHANGE UP
ORGANISM # SPEC MICROSCOPIC CNT: SIGNIFICANT CHANGE UP
PLATELET # BLD AUTO: 72 K/UL — LOW (ref 150–400)
POTASSIUM SERPL-MCNC: 4.3 MMOL/L — SIGNIFICANT CHANGE UP (ref 3.5–5.3)
POTASSIUM SERPL-MCNC: 4.4 MMOL/L — SIGNIFICANT CHANGE UP (ref 3.5–5.3)
POTASSIUM SERPL-SCNC: 4.3 MMOL/L — SIGNIFICANT CHANGE UP (ref 3.5–5.3)
POTASSIUM SERPL-SCNC: 4.4 MMOL/L — SIGNIFICANT CHANGE UP (ref 3.5–5.3)
PROT SERPL-MCNC: 5.3 G/DL — LOW (ref 6–8.3)
PROT SERPL-MCNC: 5.5 G/DL — LOW (ref 6–8.3)
PROTHROM AB SERPL-ACNC: 28.7 SEC — HIGH (ref 10–13.1)
RBC # BLD: 2.1 M/UL — LOW (ref 4.2–5.8)
RBC # FLD: 21.2 % — HIGH (ref 10.3–14.5)
SODIUM SERPL-SCNC: 138 MMOL/L — SIGNIFICANT CHANGE UP (ref 135–145)
SODIUM SERPL-SCNC: 139 MMOL/L — SIGNIFICANT CHANGE UP (ref 135–145)
SPECIMEN SOURCE: SIGNIFICANT CHANGE UP
WBC # BLD: 4.63 K/UL — SIGNIFICANT CHANGE UP (ref 3.8–10.5)
WBC # FLD AUTO: 4.63 K/UL — SIGNIFICANT CHANGE UP (ref 3.8–10.5)

## 2020-04-27 PROCEDURE — 99232 SBSQ HOSP IP/OBS MODERATE 35: CPT

## 2020-04-27 PROCEDURE — 99233 SBSQ HOSP IP/OBS HIGH 50: CPT

## 2020-04-27 RX ORDER — NITROFURANTOIN MACROCRYSTAL 50 MG
100 CAPSULE ORAL
Refills: 0 | Status: DISCONTINUED | OUTPATIENT
Start: 2020-04-27 | End: 2020-04-29

## 2020-04-27 RX ADMIN — MIDODRINE HYDROCHLORIDE 20 MILLIGRAM(S): 2.5 TABLET ORAL at 06:49

## 2020-04-27 RX ADMIN — TAMSULOSIN HYDROCHLORIDE 0.4 MILLIGRAM(S): 0.4 CAPSULE ORAL at 22:28

## 2020-04-27 RX ADMIN — Medication 100 MILLIGRAM(S): at 17:30

## 2020-04-27 RX ADMIN — LACTULOSE 30 GRAM(S): 10 SOLUTION ORAL at 06:49

## 2020-04-27 RX ADMIN — Medication 2: at 17:25

## 2020-04-27 RX ADMIN — MIDODRINE HYDROCHLORIDE 20 MILLIGRAM(S): 2.5 TABLET ORAL at 17:26

## 2020-04-27 RX ADMIN — LACTULOSE 30 GRAM(S): 10 SOLUTION ORAL at 17:25

## 2020-04-27 RX ADMIN — ERTAPENEM SODIUM 120 MILLIGRAM(S): 1 INJECTION, POWDER, LYOPHILIZED, FOR SOLUTION INTRAMUSCULAR; INTRAVENOUS at 06:48

## 2020-04-27 RX ADMIN — PANTOPRAZOLE SODIUM 40 MILLIGRAM(S): 20 TABLET, DELAYED RELEASE ORAL at 06:52

## 2020-04-27 RX ADMIN — Medication 1: at 11:45

## 2020-04-27 RX ADMIN — MIDODRINE HYDROCHLORIDE 20 MILLIGRAM(S): 2.5 TABLET ORAL at 11:39

## 2020-04-27 RX ADMIN — LACTULOSE 30 GRAM(S): 10 SOLUTION ORAL at 11:39

## 2020-04-27 RX ADMIN — Medication 1 MILLIGRAM(S): at 11:40

## 2020-04-27 RX ADMIN — LACTULOSE 30 GRAM(S): 10 SOLUTION ORAL at 22:29

## 2020-04-27 NOTE — CONSULT NOTE ADULT - SUBJECTIVE AND OBJECTIVE BOX
Patient is a 64y old  Male who presents with a chief complaint of confusion (27 Apr 2020 10:11)      HPI:  64M c hx 14 hospitalizations in the past 2 years for various reasons, DM2, dyslipidemia, obesity, BPH, Orthostatic hypotension, GERD, HTN, peripheral neuropathy, HFpEF with mild LV diastolic dysfunction, weakness, and decompensated JEAN cirrhosis on [facility] transplant list c/b chronic liver failure, SBP, hepatic encephalopathy, anemia of chronic disease, ?MGUS, duodenal ulcer, GAVE/duodenal AVM s/p APC (last on 10/11/19), ESBL ecoli bacteremia and prostate abscess, recent hospitalization for syncope, and 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    History obtained from pt and chart. Also spoke with pt's wife over phone. Pt is a poor historian, confused, A&Ox1. Pt reports 2-3 days of confusion. Denies fevers, chills, abd pain, urinary symptoms, bleeding from anywhere, abdominal swelling. Pt confirms that he completed his antibiotics via a PICC line. Per ED, pt did not arrive with a bob, despite pt stating that he still had a bob in. Per wife, pt has only been complaining of pain in his right foot 2/2 pressure ulcer. Pt also complained yesterday morning that he "just didn't feel good". Pt's baseline mental status is A&Ox4. No recent illness in the immediate family. Pt has not had any URI symptoms. Pt has difficulty ambulating, and requires PT assistance.    In the ED afebrile, normotensive but tachycardic to > 90. On presentation WBC of 5.33 with 60.2% PMN. U/A with 149 WBC. COVID19 PCR negative. CXR clear. CT A/P with resolution of prostate abscess, small ascites and Nonobstructive 0.6 cm left intrarenal calculus. In the ED received Ertapenem and Ceftriaxone. Urine Cultures with >100K VRE.        prior hospital charts reviewed [  ]  primary team notes reviewed [  ]  other consultant notes reviewed [  ]    PAST MEDICAL & SURGICAL HISTORY:  GIB (gastrointestinal bleeding)  GERD with esophagitis: Gastritis &amp; Non Bleeding Ulcers  Hepatic encephalopathy  Obesity  Fatty liver disease, nonalcoholic  Renal stones: 25 years ago  Hypertension  Neuropathy  Hypercholesteremia  Diabetes  S/P cholecystectomy      Allergies  codeine (Anaphylaxis)    ANTIMICROBIALS (past 90 days)  MEDICATIONS  (STANDING):  cefTRIAXone   IVPB   100 mL/Hr IV Intermittent (04-25-20 @ 00:24)    ertapenem  IVPB   120 mL/Hr IV Intermittent (04-25-20 @ 05:11)    ertapenem  IVPB   120 mL/Hr IV Intermittent (04-26-20 @ 05:10)    ertapenem  IVPB   120 mL/Hr IV Intermittent (04-27-20 @ 06:48)    rifAXIMin   550 milliGRAM(s) Oral (04-27-20 @ 06:49)   550 milliGRAM(s) Oral (04-26-20 @ 17:22)   550 milliGRAM(s) Oral (04-26-20 @ 06:49)   550 milliGRAM(s) Oral (04-25-20 @ 17:48)   550 milliGRAM(s) Oral (04-25-20 @ 06:05)      ANTIMICROBIALS:    ertapenem  IVPB 1000 every 24 hours  rifAXIMin 550 two times a day    OTHER MEDS: MEDICATIONS  (STANDING):  acetaminophen   Tablet .. 500 every 6 hours PRN  dextrose 40% Gel 15 once PRN  dextrose 50% Injectable 12.5 once  dextrose 50% Injectable 25 once  dextrose 50% Injectable 25 once  glucagon  Injectable 1 once PRN  insulin lispro (HumaLOG) corrective regimen sliding scale  three times a day before meals  insulin lispro (HumaLOG) corrective regimen sliding scale  at bedtime  lactulose Syrup 30 every 6 hours  midodrine. 20 three times a day  pantoprazole    Tablet 40 before breakfast  tamsulosin 0.4 at bedtime    SOCIAL HISTORY:   hx smoking  non-smoker    FAMILY HISTORY:  Family history of type 2 diabetes mellitus  Family history of hypertension  Family history of stomach cancer    REVIEW OF SYSTEMS  [  ] ROS unobtainable because:    [  ] All other systems negative except as noted below:	    Constitutional:  [ ] fever [ ] chills  [ ] weight loss  [ ] weakness  Skin:  [ ] rash [ ] phlebitis	  Eyes: [ ] icterus [ ] pain  [ ] discharge	  ENMT: [ ] sore throat  [ ] thrush [ ] ulcers [ ] exudates  Respiratory: [ ] dyspnea [ ] hemoptysis [ ] cough [ ] sputum	  Cardiovascular:  [ ] chest pain [ ] palpitations [ ] edema	  Gastrointestinal:  [ ] nausea [ ] vomiting [ ] diarrhea [ ] constipation [ ] pain	  Genitourinary:  [ ] dysuria [ ] frequency [ ] hematuria [ ] discharge [ ] flank pain  [ ] incontinence  Musculoskeletal:  [ ] myalgias [ ] arthralgias [ ] arthritis  [ ] back pain  Neurological:  [ ] headache [ ] seizures  [ ] confusion/altered mental status  Psychiatric:  [ ] anxiety [ ] depression	  Hematology/Lymphatics:  [ ] lymphadenopathy  Endocrine:  [ ] adrenal [ ] thyroid  Allergic/Immunologic:	 [ ] transplant [ ] seasonal    Vital Signs Last 24 Hrs  T(F): 98.2 (04-27-20 @ 09:15), Max: 98.7 (04-26-20 @ 07:15)  Vital Signs Last 24 Hrs  HR: 90 (04-27-20 @ 09:15) (87 - 95)  BP: 122/55 (04-27-20 @ 09:15) (116/62 - 135/67)  RR: 18 (04-27-20 @ 09:15)  SpO2: 97% (04-27-20 @ 09:15) (96% - 99%)  Wt(kg): --    PHYSICAL EXAMINATION:  General: Alert and Awake, NAD  HEENT: PERRL, EOMI, No subconjunctival hemorrhages, Oropharynx Clear, MMM  Neck: Supple, No CHELSEA  Cardiac: RRR, No M/R/G  Resp: CTAB, No Wh/Rh/Ra  Abdomen: NBS, NT/ND, No HSM, No rigidity or guarding  MSK: No LE edema. No stigmata of IE. No evidence of phlebitis. No evidence of synovitis.  : No bob  Skin: No rashes or lesions. Skin is warm and dry to the touch.   Neuro: Alert and Awake. CN 2-12 Grossly intact. Moves all four extremities spontaneously.  Psych: Calm, Pleasant, Cooperative                          7.3    4.63  )-----------( 72       ( 27 Apr 2020 05:54 )             22.3     04-27    139  |  107  |  12  ----------------------------<  123<H>  4.3   |  21<L>  |  1.10    Ca    9.3      27 Apr 2020 05:54    TPro  5.3<L>  /  Alb  2.7<L>  /  TBili  8.7<H>  /  DBili  1.9<H>  /  AST  27  /  ALT  14  /  AlkPhos  100  04-27    MICROBIOLOGY:  Culture - Urine (collected 25 Apr 2020 07:31)  Source: .Urine Clean Catch (Midstream)  Final Report (27 Apr 2020 11:01):    >100,000 CFU/ml Enterococcus faecium (vancomycin resistant)  Organism: Enterococcus faecium (vancomycin resistant) (27 Apr 2020 11:01)  Organism: Enterococcus faecium (vancomycin resistant) (27 Apr 2020 11:01)      -  Ampicillin: R >8 Predicts results to ampicillin/sulbactam, amoxacillin-clavulanate and  piperacillin-tazobactam.      -  Ciprofloxacin: R >2      -  Daptomycin: S 4      -  Levofloxacin: R >4      -  Linezolid: S <=1      -  Nitrofurantoin: S <=32 Should not be used to treat pyelonephritis.      -  Tetra/Doxy: S <=4      -  Vancomycin: R >16      Method Type: LEONARDO    Culture - Blood (collected 25 Apr 2020 02:52)  Source: .Blood Blood-Venous  Preliminary Report (26 Apr 2020 03:01):    No growth to date.    Culture - Blood (collected 25 Apr 2020 01:53)  Source: .Blood Blood-Peripheral  Preliminary Report (26 Apr 2020 02:01):    No growth to date.            CMV IgG Antibody: <0.20 U/mL (12-04-19 @ 08:33)  Toxoplasma IgG Screen: <3.0 IU/mL (12-04-19 @ 08:33)          RADIOLOGY:    <The imaging below has been reviewed and visualized by me independently. Findings as detailed in report below>    EXAM:  CT ABDOMEN AND PELVIS IC                        PROCEDURE DATE:  04/25/2020    Resolution of prostate abscess seen on CT abdomen pelvis 3/12/2020.  Cirrhosis.  Small perihepatic, perisplenic, and pelvic ascites.  Nonobstructive 0.6 cm left intrarenal calculus.    EXAM:  XR CHEST PORTABLE URGENT 1V                        PROCEDURE DATE:  04/24/2020    The lungs are clear. There is no pleural effusion or pneumothorax. The cardiac silhouette is unremarkable. No acute osseous abnormality.    EXAM:  MR FOOT RT                        PROCEDURE DATE:  04/26/2020    Limited exam. No evidence of osteomyelitis.  Diffuse subcutaneous soft tissue infiltration which may be secondary to edema or cellulitis.  Advanced second tar Patient is a 64y old  Male who presents with a chief complaint of confusion (27 Apr 2020 10:11)    HPI:    64 year old male JEAN cirrhosis on [facility] transplant list, DM2, Orthostatic hypotension, GERD, HTN, peripheral neuropathy, HFpEF with mild LV diastolic dysfunction who presented to [facility] on 4/24 with encephalopathy. Of note, recent admission with ESBL E coli prostate Abscess s/p 4 week course of Ertapenem (End: 4/11). Patient had 2-3 days of confusion prior to admission. No known preceding chills or fevers. No abdominal pain or urinary symptoms.     In the ED afebrile, normotensive but tachycardic to > 90. On presentation WBC of 5.33 with 60.2% PMN. U/A with 149 WBC. COVID19 PCR negative. CXR clear. CT A/P with resolution of prostate abscess, small ascites and Nonobstructive 0.6 cm left intrarenal calculus. In the ED received Ertapenem and Ceftriaxone. Urine Cultures with >100K VRE.      prior hospital charts reviewed [ x  ]  primary team notes reviewed [ x ]  other consultant notes reviewed [ x ]    PAST MEDICAL & SURGICAL HISTORY:  GIB (gastrointestinal bleeding)  GERD with esophagitis: Gastritis &amp; Non Bleeding Ulcers  Hepatic encephalopathy  Obesity  Fatty liver disease, nonalcoholic  Renal stones: 25 years ago  Hypertension  Neuropathy  Hypercholesteremia  Diabetes  S/P cholecystectomy      Allergies  codeine (Anaphylaxis)    ANTIMICROBIALS (past 90 days)  MEDICATIONS  (STANDING):  cefTRIAXone   IVPB   100 mL/Hr IV Intermittent (04-25-20 @ 00:24)    ertapenem  IVPB   120 mL/Hr IV Intermittent (04-25-20 @ 05:11)    ertapenem  IVPB   120 mL/Hr IV Intermittent (04-26-20 @ 05:10)    ertapenem  IVPB   120 mL/Hr IV Intermittent (04-27-20 @ 06:48)    rifAXIMin   550 milliGRAM(s) Oral (04-27-20 @ 06:49)   550 milliGRAM(s) Oral (04-26-20 @ 17:22)   550 milliGRAM(s) Oral (04-26-20 @ 06:49)   550 milliGRAM(s) Oral (04-25-20 @ 17:48)   550 milliGRAM(s) Oral (04-25-20 @ 06:05)      ANTIMICROBIALS:    ertapenem  IVPB 1000 every 24 hours  rifAXIMin 550 two times a day    OTHER MEDS: MEDICATIONS  (STANDING):  acetaminophen   Tablet .. 500 every 6 hours PRN  dextrose 40% Gel 15 once PRN  dextrose 50% Injectable 12.5 once  dextrose 50% Injectable 25 once  dextrose 50% Injectable 25 once  glucagon  Injectable 1 once PRN  insulin lispro (HumaLOG) corrective regimen sliding scale  three times a day before meals  insulin lispro (HumaLOG) corrective regimen sliding scale  at bedtime  lactulose Syrup 30 every 6 hours  midodrine. 20 three times a day  pantoprazole    Tablet 40 before breakfast  tamsulosin 0.4 at bedtime    SOCIAL HISTORY: No smoking, drugs, or alcohol. Lives in [place] with family.     FAMILY HISTORY:  Family history of type 2 diabetes mellitus  Family history of hypertension  Family history of stomach cancer    REVIEW OF SYSTEMS  [  ] ROS unobtainable because:    [ x ] All other systems negative except as noted below:	    Constitutional:  [ ] fever [ ] chills  [ ] weight loss  [ ] weakness  Skin:  [ ] rash [ ] phlebitis	  Eyes: [ ] icterus [ ] pain  [ ] discharge	  ENMT: [ ] sore throat  [ ] thrush [ ] ulcers [ ] exudates  Respiratory: [ ] dyspnea [ ] hemoptysis [ ] cough [ ] sputum	  Cardiovascular:  [ ] chest pain [ ] palpitations [ ] edema	  Gastrointestinal:  [ ] nausea [ ] vomiting [ ] diarrhea [ ] constipation [ ] pain	  Genitourinary:  [ ] dysuria [ ] frequency [ ] hematuria [ ] discharge [ ] flank pain  [ ] incontinence  Musculoskeletal:  [ ] myalgias [ ] arthralgias [ ] arthritis  [ ] back pain +RLE Foot wound  Neurological:  [ ] headache [ ] seizures  [x ] confusion/altered mental status  Psychiatric:  [ ] anxiety [ ] depression	  Hematology/Lymphatics:  [ ] lymphadenopathy  Endocrine:  [ ] adrenal [ ] thyroid  Allergic/Immunologic:	 [ ] transplant [ ] seasonal    Vital Signs Last 24 Hrs  T(F): 98.2 (04-27-20 @ 09:15), Max: 98.7 (04-26-20 @ 07:15)  Vital Signs Last 24 Hrs  HR: 90 (04-27-20 @ 09:15) (87 - 95)  BP: 122/55 (04-27-20 @ 09:15) (116/62 - 135/67)  RR: 18 (04-27-20 @ 09:15)  SpO2: 97% (04-27-20 @ 09:15) (96% - 99%)  Wt(kg): --    PHYSICAL EXAMINATION:  General: Alert and Awake, NAD, Jaundiced  HEENT: PERRL, EOMI, Scleral Icterus, Oropharynx Clear, MMM  Neck: Supple, No CHELSEA  Cardiac: RRR, No M/R/G  Resp: CTAB, No Wh/Rh/Ra  Abdomen: NBS, NT/ND, No HSM, No rigidity or guarding  MSK: R Heel superficial ulcer (no surrounding erythema or drainage), No LE edema. No stigmata of IE. No evidence of phlebitis. No evidence of synovitis.  : No bob  Skin: No rashes or lesions. Skin is warm and dry to the touch.   Neuro: Alert and Awake. CN 2-12 Grossly intact. Moves all four extremities spontaneously.  Psych: Calm, Pleasant, Cooperative                          7.3    4.63  )-----------( 72       ( 27 Apr 2020 05:54 )             22.3     04-27    139  |  107  |  12  ----------------------------<  123<H>  4.3   |  21<L>  |  1.10    Ca    9.3      27 Apr 2020 05:54    TPro  5.3<L>  /  Alb  2.7<L>  /  TBili  8.7<H>  /  DBili  1.9<H>  /  AST  27  /  ALT  14  /  AlkPhos  100  04-27    MICROBIOLOGY:  Culture - Urine (collected 25 Apr 2020 07:31)  Source: .Urine Clean Catch (Midstream)  Final Report (27 Apr 2020 11:01):    >100,000 CFU/ml Enterococcus faecium (vancomycin resistant)  Organism: Enterococcus faecium (vancomycin resistant) (27 Apr 2020 11:01)  Organism: Enterococcus faecium (vancomycin resistant) (27 Apr 2020 11:01)      -  Ampicillin: R >8 Predicts results to ampicillin/sulbactam, amoxacillin-clavulanate and  piperacillin-tazobactam.      -  Ciprofloxacin: R >2      -  Daptomycin: S 4      -  Levofloxacin: R >4      -  Linezolid: S <=1      -  Nitrofurantoin: S <=32 Should not be used to treat pyelonephritis.      -  Tetra/Doxy: S <=4      -  Vancomycin: R >16      Method Type: LEONARDO    Culture - Blood (collected 25 Apr 2020 02:52)  Source: .Blood Blood-Venous  Preliminary Report (26 Apr 2020 03:01):    No growth to date.    Culture - Blood (collected 25 Apr 2020 01:53)  Source: .Blood Blood-Peripheral  Preliminary Report (26 Apr 2020 02:01):    No growth to date.    CMV IgG Antibody: <0.20 U/mL (12-04-19 @ 08:33)  Toxoplasma IgG Screen: <3.0 IU/mL (12-04-19 @ 08:33)    RADIOLOGY:    <The imaging below has been reviewed and visualized by me independently. Findings as detailed in report below>    EXAM:  CT ABDOMEN AND PELVIS IC                        PROCEDURE DATE:  04/25/2020    Resolution of prostate abscess seen on CT abdomen pelvis 3/12/2020.  Cirrhosis.  Small perihepatic, perisplenic, and pelvic ascites.  Nonobstructive 0.6 cm left intrarenal calculus.    EXAM:  XR CHEST PORTABLE URGENT 1V                        PROCEDURE DATE:  04/24/2020    The lungs are clear. There is no pleural effusion or pneumothorax. The cardiac silhouette is unremarkable. No acute osseous abnormality.    EXAM:  MR FOOT RT                        PROCEDURE DATE:  04/26/2020    Limited exam. No evidence of osteomyelitis.  Diffuse subcutaneous soft tissue infiltration which may be secondary to edema or cellulitis.  Advanced second tar

## 2020-04-27 NOTE — PROGRESS NOTE ADULT - ATTENDING COMMENTS
patient with multiple co-morbid conditions; high risk for future complications despite optimal medical therapy   advanced multi-organ disease  plan of care discussed with floor NP/PA

## 2020-04-27 NOTE — CONSULT NOTE ADULT - ASSESSMENT
64 year old male JEAN cirrhosis on [facility] transplant list, DM2, Orthostatic hypotension, GERD, HTN, peripheral neuropathy, HFpEF with mild LV diastolic dysfunction who presented to [facility] on 4/24 with encephalopathy.    U/A with 149 WBC.   Urine Cultures with >100K VRE.   COVID19 PCR negative.   CXR clear.   CT A/P with resolution of prostate abscess, small ascites and Nonobstructive 0.6 cm left intrarenal calculus.     Received Ceftriaxone and Ertapenem prior to UCx   Unclear if VRE was selected for and does not represent true pathogen  Patient improved while on Ertapenem but still not at baseline    We have some narrow-spectrum options for VRE (i.e. Macrobid) so it can be initiated  Low suspicion for SBP  Would complete 5 day course of Ertapenem for empiric UTI coverage    Overall, Encephalopathy, Abnormal Urinalysis, R Heel Ulcer, Elevated Bilirubin, VRE Colonization, Pre-OLT Evaluation    --Recommend Macrobid 100 mg PO BID x 5 days  --Continue Ertapenem 1g IV Q24H (End Date: 4/29)  --Continue wound care to RLE Foot  --Not cleared for OLT at this time.   --Continue to follow CBC with diff  --Continue to follow transaminases  --Continue to follow temperature curve  --Follow up on preliminary blood cultures    I will continue to follow. Please feel free to contact me with any further questions.    Eusebio Pérez M.D.  [facility] Division of Infectious Disease  8AM-5PM: Pager Number [phone]  After Hours (or if no response): Please contact the Infectious Diseases Office at [phone]     The above assessment and plan were discussed with medicine NP

## 2020-04-27 NOTE — PROGRESS NOTE ADULT - SUBJECTIVE AND OBJECTIVE BOX
Chief Complaint: Confusion    Interval Events:   - The patient has no new complaints this morning    Allergies:  codeine (Anaphylaxis)    Hospital Medications:  acetaminophen   Tablet .. 500 milliGRAM(s) Oral every 6 hours PRN  dextrose 40% Gel 15 Gram(s) Oral once PRN  dextrose 5%. 1000 milliLiter(s) IV Continuous <Continuous>  dextrose 50% Injectable 12.5 Gram(s) IV Push once  dextrose 50% Injectable 25 Gram(s) IV Push once  dextrose 50% Injectable 25 Gram(s) IV Push once  ertapenem  IVPB 1000 milliGRAM(s) IV Intermittent every 24 hours  folic acid 1 milliGRAM(s) Oral daily  glucagon  Injectable 1 milliGRAM(s) IntraMuscular once PRN  insulin lispro (HumaLOG) corrective regimen sliding scale   SubCutaneous three times a day before meals  insulin lispro (HumaLOG) corrective regimen sliding scale   SubCutaneous at bedtime  lactulose Syrup 30 Gram(s) Oral every 6 hours  midodrine. 20 milliGRAM(s) Oral three times a day  pantoprazole    Tablet 40 milliGRAM(s) Oral before breakfast  rifAXIMin 550 milliGRAM(s) Oral two times a day  tamsulosin 0.4 milliGRAM(s) Oral at bedtime    PMHX/PSHX:  GIB (gastrointestinal bleeding)  GERD with esophagitis  Hepatic encephalopathy  Obesity  Fatty liver disease, nonalcoholic  Renal stones  Hypertension  Neuropathy  Hypercholesteremia  Diabetes  S/P cholecystectomy    Family history:  Family history of type 2 diabetes mellitus  Family history of hypertension  Family history of stomach cancer  No pertinent family history in first degree relatives    ROS:     General:  No wt loss, fevers, chills, night sweats, fatigue,   Eyes:  Good vision, no reported pain  ENT:  No sore throat, pain, runny nose, dysphagia  CV:  No pain, palpitations, hypo/hypertension  Pulm:  No dyspnea, cough, tachypnea, wheezing  GI:  No pain, No nausea, No vomiting, No diarrhea, No constipation, No weight loss, No fever, No pruritis, No rectal bleeding, No tarry stools, No dysphagia  :  No pain, bleeding, incontinence, nocturia  Muscle:  No pain, weakness  Neuro:  No weakness, tingling, memory problems  Psych:  No fatigue, insomnia, mood problems, depression  Endocrine:  No polyuria, polydipsia, cold/heat intolerance  Heme:  No petechiae, ecchymosis, easy bruisability  Skin:  No rash, tattoos, scars, edema      PHYSICAL EXAM:   Vital Signs:  Vital Signs Last 24 Hrs  T(C): 36.5 (27 Apr 2020 05:56), Max: 36.7 (26 Apr 2020 23:25)  T(F): 97.7 (27 Apr 2020 05:56), Max: 98 (26 Apr 2020 23:25)  HR: 88 (27 Apr 2020 05:56) (87 - 95)  BP: 116/62 (27 Apr 2020 05:56) (116/62 - 135/67)  BP(mean): --  RR: 18 (27 Apr 2020 05:56) (18 - 18)  SpO2: 98% (27 Apr 2020 05:56) (96% - 99%)    GENERAL:  No acute distress, obese  HEENT:  Normocephalic/atraumatic,  no scleral icterus  CHEST: Normal effort, no accessory muscle use  ABDOMEN:  Soft, non-tender, non-distended, normoactive bowel sounds  EXTREMITIES:  No cyanosis, clubbing, or edema  SKIN:  No rash/erythema  NEURO:  Alert and oriented x 3, no asterixis, no tremor    LABS:                        7.3    4.63  )-----------( 72       ( 27 Apr 2020 05:54 )             22.3     Mean Cell Volume: 106.2 fl (04-27-20 @ 05:54)    04-27    139  |  107  |  12  ----------------------------<  123<H>  4.3   |  21<L>  |  1.10    Ca    9.3      27 Apr 2020 05:54    TPro  5.3<L>  /  Alb  2.7<L>  /  TBili  8.7<H>  /  DBili  1.9<H>  /  AST  27  /  ALT  14  /  AlkPhos  100  04-27    LIVER FUNCTIONS - ( 27 Apr 2020 05:54 )  Alb: 2.7 g/dL / Pro: 5.3 g/dL / ALK PHOS: 100 U/L / ALT: 14 U/L / AST: 27 U/L / GGT: x           PT/INR - ( 26 Apr 2020 09:17 )   PT: 28.4 sec;   INR: 2.40 ratio                                     7.3    4.63  )-----------( 72       ( 27 Apr 2020 05:54 )             22.3                         6.7    4.20  )-----------( 63       ( 26 Apr 2020 19:20 )             20.7                         6.5    4.38  )-----------( 65       ( 26 Apr 2020 09:29 )             19.7                         6.4    5.42  )-----------( 77       ( 26 Apr 2020 07:27 )             19.8                         7.8    5.33  )-----------( 101      ( 24 Apr 2020 23:15 )             23.8     Imaging:    No new imaging

## 2020-04-27 NOTE — PROGRESS NOTE ADULT - SUBJECTIVE AND OBJECTIVE BOX
Podiatry pager #: [phone]/ [phone]    Patient is a 64y old  Male who presents with a chief complaint of Confusion (30 Apr 2020 07:23) podiatry consulted today for evaluation of right heel wound, patient currently being followed by outpatient dpm for wound care and has heel offloading shoes at bedside      HPI:  64M c hx 14 hospitalizations in the past 2 years for various reasons, DM2, dyslipidemia, obesity, BPH, Orthostatic hypotension, GERD, HTN, peripheral neuropathy, HFpEF with mild LV diastolic dysfunction, weakness, and decompensated JEAN cirrhosis on UNOS transplant list c/b chronic liver failure, SBP, hepatic encephalopathy, anemia of chronic disease, ?MGUS, duodenal ulcer, GAVE/duodenal AVM s/p APC (last on 10/11/19), ESBL ecoli bacteremia and prostate abscess, recent hospitalization for syncope, and 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    History obtained from pt and chart. Also spoke with pt's wife over phone. Pt is a poor historian, confused, A&Ox1. Pt reports 2-3 days of confusion. Denies fevers, chills, abd pain, urinary symptoms, bleeding from anywhere, abdominal swelling. Pt confirms that he completed his antibiotics via a PICC line. Per ED, pt did not arrive with a bob, despite pt stating that he still had a bob in. Per wife, pt has only been complaining of pain in his right foot 2/2 pressure ulcer. Pt also complained yesterday morning that he "just didn't feel good". Pt's baseline mental status is A&Ox4. No recent illness in the immediate family. Pt has not had any URI symptoms. Pt has difficulty ambulating, and requires PT assistance.    VS: Tm 98.3, P 83, /70, R 18, 99% RA  In the Ed received ceftriaxone, lactulose 20, constant observation (25 Apr 2020 04:01)      PAST MEDICAL & SURGICAL HISTORY:  GIB (gastrointestinal bleeding)  GERD with esophagitis: Gastritis &amp; Non Bleeding Ulcers  Hepatic encephalopathy  Obesity  Fatty liver disease, nonalcoholic  Renal stones: 25 years ago  Hypertension  Neuropathy  Hypercholesteremia  Diabetes  S/P cholecystectomy      MEDICATIONS  (STANDING):  dextrose 5%. 1000 milliLiter(s) (50 mL/Hr) IV Continuous <Continuous>  dextrose 50% Injectable 12.5 Gram(s) IV Push once  dextrose 50% Injectable 25 Gram(s) IV Push once  dextrose 50% Injectable 25 Gram(s) IV Push once  furosemide   Injectable 40 milliGRAM(s) IV Push two times a day  insulin lispro (HumaLOG) corrective regimen sliding scale   SubCutaneous three times a day before meals  insulin lispro (HumaLOG) corrective regimen sliding scale   SubCutaneous at bedtime  lactulose Syrup 20 Gram(s) Oral every 8 hours  midodrine. 20 milliGRAM(s) Oral three times a day  nitrofurantoin monohydrate/macrocrystals (MACROBID) 100 milliGRAM(s) Oral two times a day with meals  pantoprazole    Tablet 40 milliGRAM(s) Oral before breakfast  rifAXIMin 550 milliGRAM(s) Oral two times a day  tamsulosin 0.4 milliGRAM(s) Oral at bedtime    MEDICATIONS  (PRN):  acetaminophen   Tablet .. 500 milliGRAM(s) Oral every 6 hours PRN Temp greater or equal to 38C (100.4F), Mild Pain (1 - 3)  dextrose 40% Gel 15 Gram(s) Oral once PRN Blood Glucose LESS THAN 70 milliGRAM(s)/deciliter  glucagon  Injectable 1 milliGRAM(s) IntraMuscular once PRN Glucose LESS THAN 70 milligrams/deciliter      Allergies    codeine (Anaphylaxis)    Intolerances        VITALS:    Vital Signs Last 24 Hrs  T(C): 36.7 (30 Apr 2020 05:43), Max: 36.8 (30 Apr 2020 01:17)  T(F): 98 (30 Apr 2020 05:43), Max: 98.2 (30 Apr 2020 01:17)  HR: 80 (30 Apr 2020 05:43) (76 - 84)  BP: 120/62 (30 Apr 2020 05:43) (116/64 - 130/67)  BP(mean): --  RR: 18 (30 Apr 2020 05:43) (18 - 20)  SpO2: 97% (30 Apr 2020 05:43) (96% - 98%)    LABS:                          7.9    4.28  )-----------( 66       ( 30 Apr 2020 06:47 )             24.1       04-30    136  |  100  |  16  ----------------------------<  148<H>  4.0   |  29  |  1.04    Ca    8.6      30 Apr 2020 06:46    TPro  5.4<L>  /  Alb  2.5<L>  /  TBili  6.8<H>  /  DBili  x   /  AST  25  /  ALT  14  /  AlkPhos  120  04-30      CAPILLARY BLOOD GLUCOSE      POCT Blood Glucose.: 124 mg/dL (30 Apr 2020 08:02)  POCT Blood Glucose.: 249 mg/dL (29 Apr 2020 21:26)  POCT Blood Glucose.: 164 mg/dL (29 Apr 2020 17:12)  POCT Blood Glucose.: 221 mg/dL (29 Apr 2020 11:31)      PT/INR - ( 30 Apr 2020 08:15 )   PT: 29.9 sec;   INR: 2.55 ratio             LOWER EXTREMITY PHYSICAL EXAM:    Vasular: DP/PT 1_/4, B/L, CFT <_ 2seconds B/L, Temperature gradient _wnl, B/L.   Neuro: Epicritic sensation diminished to the level of toes, B/L.:  Skin:  Wound #1: right foot  Location: plantar heel  Size: 5.5cm diameter  Depth: 2mm   Wound bed: granular   Drainage: minimal serous drainage on dressing  Odor: none  Periwound: no clinical signs of infection  Etiology: DM    RADIOLOGY & ADDITIONAL STUDIES:  < from: Xray Foot AP + Lateral + Oblique, Right (04.24.20 @ 23:38) >  EXAM:  FOOT COMPLETE RIGHT (MIN 3 VIEW)                            PROCEDURE DATE:  04/24/2020            INTERPRETATION:  CLINICAL INFORMATION: Heel ulcer.    EXAM: 3 views of the right foot.    COMPARISON: No similar prior studies available for comparison    FINDINGS:  Generalized osteopenia.    No periosteal reaction or cortical erosions to suggest osteomyelitis.     Tarsometatarsal arthrosis with large dorsal spurring.    No acute fracture or dislocation.    Large plantar calcaneal enthesophyte. Vascular calcifications. Extensive soft tissue swelling.    IMPRESSION:  Soft tissue swelling without radiographic evidence for osteomyelitis.                      CYNDI CHANEY M.D., RADIOLOGY RESIDENT  This document has been electronically signed.  CHAUNCEY KELSEY M.D., ATTENDING RADIOLOGIST  This document has been electronically signed. Apr 25 2020  9:38AM    < end of copied text >

## 2020-04-27 NOTE — CONSULT NOTE ADULT - ASSESSMENT
65yo M with IDDM, dyslipidemia, obesity, GERD, HFpEF with mild LV diastolic dysfunction, and decompensated JEAN cirrhosis complicated by ascites, history of SBP, hepatic encephalopathy, and chronic anemia with a history of duodenal ulcer as well as GAVE and duodenal AVM s/p APC (last on 10/11/19), who is [facility] listed for liver transplantation at [facility] presented with hepatic encephalopathy and possible UTI vs R foot infection    1) Anemia  -He has had extensive work up in past, thought to be secondary to anemia of chronic disease as well as component of MGUS, with macrocytosis underlying low grade MDS also in differential  -Would continue to monitor and transfuse for Hgb < 7  -Check b12, folate, iron studies, LDH, haptoglobin, retic count    2) Hyperbilirubinemia  -mixed picture but mostly unconjugated  -secondary to underlying liver disease vs hemolysis vs component of Gilbert's  -would check hemolytic work up as above to r/o underlying hemolysis= LDH, hapto, retic (hapto may be decreased due to liver disease)      3) History of MGUS  -check SPEP/DELORES    4) Throbmocytopenia  -chronic, stable, and likely from cirrhosis/infectino  -continue to monitor  -- transfuse for plts <10 if no bleeding, <50 if with bleeding    Thank you for the courtesy of this consultation and we will continue to follow.    Juan Carlos Rudd MD  [facility]  Cell: [phone] 63yo M with IDDM, dyslipidemia, obesity, GERD, HFpEF with mild LV diastolic dysfunction, and decompensated JEAN cirrhosis complicated by ascites, history of SBP, hepatic encephalopathy, and chronic anemia with a history of duodenal ulcer as well as GAVE and duodenal AVM s/p APC (last on 10/11/19), who is [facility] listed for liver transplantation at [facility] presented with hepatic encephalopathy and possible UTI vs R foot infection    1) Anemia  -He has had extensive work up in past, thought to be secondary to anemia of chronic disease as well as component of MGUS, with macrocytosis underlying low grade MDS also in differential  -Would continue to monitor and transfuse for Hgb < 7  -b12, folate adeq  check iron studies, LDH, haptoglobin, retic count    2) Hyperbilirubinemia  -mixed picture but mostly unconjugated  -secondary to underlying liver disease vs hemolysis vs component of Gilbert's  -would check hemolytic work up as above to r/o underlying hemolysis= LDH, hapto, retic (hapto may be decreased due to liver disease)      3) History of MGUS  -check SPEP/DELORES    4) Throbmocytopenia  -chronic, stable, and likely from cirrhosis/infectino  -continue to monitor  -- transfuse for plts <10 if no bleeding, <50 if with bleeding    Thank you for the courtesy of this consultation and we will continue to follow.    Juan Carlos Rudd MD  [facility]  Cell: [phone]

## 2020-04-27 NOTE — PROGRESS NOTE ADULT - SUBJECTIVE AND OBJECTIVE BOX
Patient is a 64y old  Male who presents with a chief complaint of confusion (27 Apr 2020 13:56)      INTERVAL History of Present Illness/OVERNIGHT EVENTS: mental status baseline now  reports right foot pain, worse on standing 10/10, tolerable when in bed, improved with pain meds    MEDICATIONS  (STANDING):  dextrose 5%. 1000 milliLiter(s) (50 mL/Hr) IV Continuous <Continuous>  dextrose 50% Injectable 12.5 Gram(s) IV Push once  dextrose 50% Injectable 25 Gram(s) IV Push once  dextrose 50% Injectable 25 Gram(s) IV Push once  ertapenem  IVPB 1000 milliGRAM(s) IV Intermittent every 24 hours  folic acid 1 milliGRAM(s) Oral daily  insulin lispro (HumaLOG) corrective regimen sliding scale   SubCutaneous three times a day before meals  insulin lispro (HumaLOG) corrective regimen sliding scale   SubCutaneous at bedtime  lactulose Syrup 30 Gram(s) Oral every 6 hours  midodrine. 20 milliGRAM(s) Oral three times a day  nitrofurantoin monohydrate/macrocrystals (MACROBID) 100 milliGRAM(s) Oral two times a day with meals  pantoprazole    Tablet 40 milliGRAM(s) Oral before breakfast  rifAXIMin 550 milliGRAM(s) Oral two times a day  tamsulosin 0.4 milliGRAM(s) Oral at bedtime    MEDICATIONS  (PRN):  acetaminophen   Tablet .. 500 milliGRAM(s) Oral every 6 hours PRN Temp greater or equal to 38C (100.4F), Mild Pain (1 - 3)  dextrose 40% Gel 15 Gram(s) Oral once PRN Blood Glucose LESS THAN 70 milliGRAM(s)/deciliter  glucagon  Injectable 1 milliGRAM(s) IntraMuscular once PRN Glucose LESS THAN 70 milligrams/deciliter      Allergies    codeine (Anaphylaxis)    Intolerances        REVIEW OF SYSTEMS:  Negative unless otherwise specified above.    Vital Signs Last 24 Hrs  T(C): 36.8 (27 Apr 2020 09:15), Max: 36.8 (27 Apr 2020 09:15)  T(F): 98.2 (27 Apr 2020 09:15), Max: 98.2 (27 Apr 2020 09:15)  HR: 90 (27 Apr 2020 09:15) (87 - 92)  BP: 122/55 (27 Apr 2020 09:15) (116/62 - 135/67)  BP(mean): --  RR: 18 (27 Apr 2020 09:15) (18 - 18)  SpO2: 97% (27 Apr 2020 09:15) (96% - 99%)        PHYSICAL EXAM:  GENERAL: No apparent distress, appears comfortable but chronically ill  HEAD:  Atraumatic, Normocephalic  EYES: Conjunctiva and sclera clear, no discharge  ENMT: Moist mucous membranes, no nasal discharge  NECK: Supple, no JVD  CHEST/LUNG: Bilateral air entry  HEART: Regular rhythm  ABDOMEN: Obese, non-tender  EXTREMITIES:  2+ leg edema bilaterally  SKIN: Pressure ulcer stage 3 right heel, no purulence  NERVOUS SYSTEM:  Alert & Oriented; Bilateral Lower extremity mobile, sensation to light touch intact      LABS:                        7.3    4.63  )-----------( 72       ( 27 Apr 2020 05:54 )             22.3     27 Apr 2020 05:54    139    |  107    |  12     ----------------------------<  123    4.3     |  21     |  1.10     Ca    9.3        27 Apr 2020 05:54    TPro  5.3    /  Alb  2.7    /  TBili  8.7    /  DBili  1.9    /  AST  27     /  ALT  14     /  AlkPhos  100    27 Apr 2020 05:54    PT/INR - ( 27 Apr 2020 07:31 )   PT: 28.7 sec;   INR: 2.45 ratio             CAPILLARY BLOOD GLUCOSE      POCT Blood Glucose.: 153 mg/dL (27 Apr 2020 11:41)  POCT Blood Glucose.: 122 mg/dL (27 Apr 2020 07:50)  POCT Blood Glucose.: 144 mg/dL (26 Apr 2020 23:21)  POCT Blood Glucose.: 194 mg/dL (26 Apr 2020 17:15)      RADIOLOGY & ADDITIONAL TESTS:    Images reviewed personally    Consultant Notes Reviewed and Care Discussed with relevant Consultants/Other Providers.

## 2020-04-27 NOTE — PROGRESS NOTE ADULT - PROBLEM SELECTOR PLAN 1
Clinically improving after resuming antibiotics and with increased lactulose.    Admission venous ammonia level elevated at 151 but will not trend as does not correlate well with clinical HE.   lactulose will continue to adjust dosing as needed based on clinical response and stool frequency (maintenance dosing with goal of maintaining 3-4 BMs/day).  Continuing rifaximin 550 mg po bid.  Continuing with antibiotics, as below, due to concern that infection was the most likely trigger of his recurrent encephalopathy.  Portal veins appear patent on CT abdomen with IV contrast (4/25). No indication for repeating RUQ US at this time.

## 2020-04-27 NOTE — CONSULT NOTE ADULT - SUBJECTIVE AND OBJECTIVE BOX
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    History obtained from nursing staff and chart.  Pt was  poor historian, confused, A&Ox1. Presented with confusion. Mental status improving today as per chart review.    Hematology consulted for his history of macrocytic anemia, and hyperbilirubinemia         PAST MEDICAL & SURGICAL HISTORY:  GIB (gastrointestinal bleeding)  GERD with esophagitis: Gastritis &amp; Non Bleeding Ulcers  Hepatic encephalopathy  Obesity  Fatty liver disease, nonalcoholic  Renal stones: 25 years ago  Hypertension  Neuropathy  Hypercholesteremia  Diabetes  S/P cholecystectomy      FAMILY HISTORY:  Family history of type 2 diabetes mellitus  Family history of hypertension  Family history of stomach cancer      Alochol: Denied  Smoking: Nonsmoker  Drug Use: Denied  Marital Status:         Allergies    codeine (Anaphylaxis)    Intolerances        MEDICATIONS  (STANDING):  dextrose 5%. 1000 milliLiter(s) (50 mL/Hr) IV Continuous <Continuous>  dextrose 50% Injectable 12.5 Gram(s) IV Push once  dextrose 50% Injectable 25 Gram(s) IV Push once  dextrose 50% Injectable 25 Gram(s) IV Push once  ertapenem  IVPB 1000 milliGRAM(s) IV Intermittent every 24 hours  folic acid 1 milliGRAM(s) Oral daily  insulin lispro (HumaLOG) corrective regimen sliding scale   SubCutaneous three times a day before meals  insulin lispro (HumaLOG) corrective regimen sliding scale   SubCutaneous at bedtime  lactulose Syrup 30 Gram(s) Oral every 6 hours  midodrine. 20 milliGRAM(s) Oral three times a day  nitrofurantoin monohydrate/macrocrystals (MACROBID) 100 milliGRAM(s) Oral two times a day with meals  pantoprazole    Tablet 40 milliGRAM(s) Oral before breakfast  rifAXIMin 550 milliGRAM(s) Oral two times a day  tamsulosin 0.4 milliGRAM(s) Oral at bedtime    MEDICATIONS  (PRN):  acetaminophen   Tablet .. 500 milliGRAM(s) Oral every 6 hours PRN Temp greater or equal to 38C (100.4F), Mild Pain (1 - 3)  dextrose 40% Gel 15 Gram(s) Oral once PRN Blood Glucose LESS THAN 70 milliGRAM(s)/deciliter  glucagon  Injectable 1 milliGRAM(s) IntraMuscular once PRN Glucose LESS THAN 70 milligrams/deciliter      ROS  No fever, sweats, chills  No epistaxis, HA, sore throat  No CP, SOB, cough, sputum  No n/v/d, abd pain, melena, hematochezia  No edema  No rash  No anxiety  No back pain, joint pain  No bleeding, bruising  No dysuria, hematuria    T(C): 36.8 (04-27-20 @ 09:15), Max: 36.8 (04-27-20 @ 09:15)  HR: 90 (04-27-20 @ 09:15) (87 - 92)  BP: 122/55 (04-27-20 @ 09:15) (116/62 - 135/67)  RR: 18 (04-27-20 @ 09:15) (18 - 18)  SpO2: 97% (04-27-20 @ 09:15) (96% - 99%)  Wt(kg): --    PE  Patient examination deferred due to concerns for COVID 19 cross transmission and potential local limitations for sufficient PPE. Risks of PE therefore deemed to outweigh the benefit for the purposes of this encounter      FROM                          7.3    4.63  )-----------( 72       ( 27 Apr 2020 05:54 )             22.3       04-27    139  |  107  |  12  ----------------------------<  123<H>  4.3   |  21<L>  |  1.10    Ca    9.3      27 Apr 2020 05:54    TPro  5.3<L>  /  Alb  2.7<L>  /  TBili  8.7<H>  /  DBili  1.9<H>  /  AST  27  /  ALT  14  /  AlkPhos  100  04-27

## 2020-04-27 NOTE — PROGRESS NOTE ADULT - PROBLEM SELECTOR PLAN 2
Appreciate Urology input.  Repeat CT (4/25) with resolution of prostatic abscess.  Voiding well after prior indwelling catheter removed as outpatient on 4/14/20,   Urinalysis (4/25) with negative nitrite but +149 wbc and +50 rbc as well as many bacteria.  Urine culture (4/25) with >100k gram positive organisms, ?Enterococcus, though previously had ESBL E. coli. Will follow-up speciation/sensitivites.  Blood cultures (4/25 x2) with NGTD.  Pending urine culture speciation/sensitivities, will continue empiric ertapenem (4/25- ), chosen given prior recent ESBL E. coli UTIs.  Transplant ID consulted, will follow-up their recommendations.  defer abx to ID

## 2020-04-27 NOTE — PROGRESS NOTE ADULT - ATTENDING COMMENTS
Patient seen and examined. Agree with assessment and plan.  Proscar   Flomax  Followup with Dr. Mayfield

## 2020-04-27 NOTE — CONSULT NOTE ADULT - ASSESSMENT
hepatic encephalopathy - improved  UTI  anemia    on rifaxmin and home-dose of lactulose; now mentating at baseline   Ucx + for enterococcus; patient with recent ESBL UTI. Invanz started; urology and ID following  s/p 2uprbc yesterday; hgb today 7.3 no overt s/s of GIB  monitor cbc, transfuse to maintain hgb between 7-8 as per transplant hepatology recs  transplant hepatology input much appreciated  will follow

## 2020-04-27 NOTE — PROGRESS NOTE ADULT - ATTENDING COMMENTS
Hepatology Staff: Yomi Medina MD    I saw and examined the patient along with  Dr. Abdi 04-27-20 @ 10:37.    Patient Medical Record, hospital course was reviewed and summarized as below:  :  Vitals: Vital Signs Last 24 Hrs  T(C): 36.8 (27 Apr 2020 09:15), Max: 36.8 (27 Apr 2020 09:15)  T(F): 98.2 (27 Apr 2020 09:15), Max: 98.2 (27 Apr 2020 09:15)  HR: 90 (27 Apr 2020 09:15) (87 - 95)  BP: 122/55 (27 Apr 2020 09:15) (116/62 - 135/67)    RR: 18 (27 Apr 2020 09:15) (18 - 18)  SpO2: 97% (27 Apr 2020 09:15) (96% - 99%)  :   Antibiotics:ertapenem  IVPB 1000 milliGRAM(s) IV Intermittent every 24 hours    Diuretics:  Labs:INR: 2.45 ratio (04-27-20 @ 07:31)  Creatinine, Serum: 1.10 mg/dL (04-27-20 @ 05:54)  Bilirubin Total, Serum: 8.7 mg/dL (04-27-20 @ 05:54)  Creatinine, Serum: 1.12 mg/dL (04-27-20 @ 05:54)  Bilirubin Total, Serum: 8.9 mg/dL (04-27-20 @ 05:54)      I/O: I&O's Summary    26 Apr 2020 07:01 - 27 Apr 2020 07:00  --------------------------------------------------------  IN: 1457 mL / OUT: 1075 mL / NET: 382 mL    27 Apr 2020 07:01 - 27 Apr 2020 10:37  --------------------------------------------------------  IN: 180 mL / OUT: 0 mL / NET: 180 mL      Nutritional Status:   Albumin, Serum: 2.7 g/dL (04-27-20 @ 05:54)  Albumin, Serum: 2.7 g/dL (04-27-20 @ 05:54)      Recommendations: Keep on broad spectrum IV antibiotics (IV Ertapenem). Initiate IV Albumin 25% 25 g q 6hrs along with IV Lasix 40 mg bid X 2 dose. Reassess for further diuretics tomorrow am based on serum Cr, and clinical response.  Cont with Lactulose and Rifaximine.      Plan discussed with Primary team.

## 2020-04-27 NOTE — PROGRESS NOTE ADULT - SUBJECTIVE AND OBJECTIVE BOX
voiding comfortably, feels empty, no clots or hematuria    T(F): 98.2, Max: 98.2 (04-27-20 @ 09:15)  HR: 90  BP: 122/55  SpO2: 97%    OUT:    Voided: 1075 mL  Total OUT: 1075 mL      Gen NAD  Abd soft, NTND   urinals yellow      04-27 @ 05:54    WBC 4.63  / Hct 22.3  / SCr 1.10     04-26 @ 19:20    WBC 4.20  / Hct 20.7  / SCr --       .Urine Clean Catch (Midstream)  04-25  >100,000 CFU/ml Gram positive organisms      .Blood Blood-Venous  04-25  No growth to date.      .Blood Blood-Peripheral  04-25  No growth to date.

## 2020-04-27 NOTE — CONSULT NOTE ADULT - SUBJECTIVE AND OBJECTIVE BOX
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 The patient did not recognize family and was getting agitated and aggressive with his wife. He was walking unsteadily. He recently saw podiatry for a possibly infected R foot wound and he also had his indwelling bob catheter removed recent by his urologist and he has been urinating excessively.   On interview the patient is confused and cannot provide historical information.   The patient's wife attempted to give him lactulose but this did not produce a bowel movement.    Allergies:  codeine (Anaphylaxis)      Medications:  acetaminophen   Tablet .. 500 milliGRAM(s) Oral every 6 hours PRN  dextrose 40% Gel 15 Gram(s) Oral once PRN  dextrose 5%. 1000 milliLiter(s) IV Continuous <Continuous>  dextrose 50% Injectable 12.5 Gram(s) IV Push once  dextrose 50% Injectable 25 Gram(s) IV Push once  dextrose 50% Injectable 25 Gram(s) IV Push once  ertapenem  IVPB 1000 milliGRAM(s) IV Intermittent every 24 hours  folic acid 1 milliGRAM(s) Oral daily  glucagon  Injectable 1 milliGRAM(s) IntraMuscular once PRN  insulin lispro (HumaLOG) corrective regimen sliding scale   SubCutaneous three times a day before meals  insulin lispro (HumaLOG) corrective regimen sliding scale   SubCutaneous at bedtime  lactulose Syrup 30 Gram(s) Oral every 6 hours  midodrine. 20 milliGRAM(s) Oral three times a day  nitrofurantoin monohydrate/macrocrystals (MACROBID) 100 milliGRAM(s) Oral two times a day with meals  pantoprazole    Tablet 40 milliGRAM(s) Oral before breakfast  rifAXIMin 550 milliGRAM(s) Oral two times a day  tamsulosin 0.4 milliGRAM(s) Oral at bedtime      PMHX/PSHX:  GIB (gastrointestinal bleeding)  GERD with esophagitis  Hepatic encephalopathy  Obesity  Fatty liver disease, nonalcoholic  Renal stones  Hypertension  Neuropathy  Hypercholesteremia  Diabetes  S/P cholecystectomy  No significant past surgical history      Family history:  Family history of type 2 diabetes mellitus  Family history of hypertension  Family history of stomach cancer  No pertinent family history in first degree relatives      Social History:     ROS:     General:  No wt loss, fevers, chills, night sweats, fatigue,   Eyes:  Good vision, no reported pain  ENT:  No sore throat, pain, runny nose, dysphagia  CV:  No pain, palpitations, hypo/hypertension  Resp:  No dyspnea, cough, tachypnea, wheezing  GI:  No pain, No nausea, No vomiting, No diarrhea, No constipation, No weight loss, No fever, No pruritis, No rectal bleeding, No tarry stools, No dysphagia,  :  No pain, bleeding, incontinence, nocturia  Muscle:  No pain, weakness  Neuro:  No weakness, tingling, memory problems  Psych:  No fatigue, insomnia, mood problems, depression  Endocrine:  No polyuria, polydipsia, cold/heat intolerance  Heme:  No petechiae, ecchymosis, easy bruisability  Skin:  No rash, tattoos, scars, edema      PHYSICAL EXAM:   Vital Signs:  Vital Signs Last 24 Hrs  T(C): 36.8 (27 Apr 2020 09:15), Max: 36.8 (27 Apr 2020 09:15)  T(F): 98.2 (27 Apr 2020 09:15), Max: 98.2 (27 Apr 2020 09:15)  HR: 90 (27 Apr 2020 09:15) (87 - 92)  BP: 122/55 (27 Apr 2020 09:15) (116/62 - 135/67)  BP(mean): --  RR: 18 (27 Apr 2020 09:15) (18 - 18)  SpO2: 97% (27 Apr 2020 09:15) (96% - 99%)  Daily     Daily     GENERAL:  Appears stated age, well-groomed, well-nourished, no distress  HEENT:  NC/AT,  conjunctivae clear and pink, no thyromegaly, nodules, adenopathy, no JVD, sclera -anicteric  CHEST:  Full & symmetric excursion, no increased effort, breath sounds clear  HEART:  Regular rhythm, S1, S2, no murmur/rub/S3/S4, no abdominal bruit, no edema  ABDOMEN:  Soft, non-tender, non-distended, normoactive bowel sounds,  no masses ,no hepato-splenomegaly, no signs of chronic liver disease  EXTEREMITIES:  no cyanosis,clubbing or edema  SKIN:  No rash/erythema/ecchymoses/petechiae/wounds/abscess/warm/dry  NEURO:  Alert, oriented, no asterixis, no tremor, no encephalopathy    LABS:                        7.3    4.63  )-----------( 72       ( 27 Apr 2020 05:54 )             22.3     04-27    139  |  107  |  12  ----------------------------<  123<H>  4.3   |  21<L>  |  1.10    Ca    9.3      27 Apr 2020 05:54    TPro  5.3<L>  /  Alb  2.7<L>  /  TBili  8.7<H>  /  DBili  1.9<H>  /  AST  27  /  ALT  14  /  AlkPhos  100  04-27    LIVER FUNCTIONS - ( 27 Apr 2020 05:54 )  Alb: 2.7 g/dL / Pro: 5.3 g/dL / ALK PHOS: 100 U/L / ALT: 14 U/L / AST: 27 U/L / GGT: x           PT/INR - ( 27 Apr 2020 07:31 )   PT: 28.7 sec;   INR: 2.45 ratio                 Imaging:

## 2020-04-28 ENCOUNTER — APPOINTMENT (OUTPATIENT)
Dept: TRANSPLANT | Facility: CLINIC | Age: 65
End: 2020-04-28

## 2020-04-28 LAB
ALBUMIN SERPL ELPH-MCNC: 2.3 G/DL — LOW (ref 3.3–5)
ALP SERPL-CCNC: 112 U/L — SIGNIFICANT CHANGE UP (ref 40–120)
ALT FLD-CCNC: 15 U/L — SIGNIFICANT CHANGE UP (ref 10–45)
ANION GAP SERPL CALC-SCNC: 8 MMOL/L — SIGNIFICANT CHANGE UP (ref 5–17)
AST SERPL-CCNC: 25 U/L — SIGNIFICANT CHANGE UP (ref 10–40)
BASOPHILS # BLD AUTO: 0.04 K/UL — SIGNIFICANT CHANGE UP (ref 0–0.2)
BASOPHILS NFR BLD AUTO: 0.9 % — SIGNIFICANT CHANGE UP (ref 0–2)
BILIRUB SERPL-MCNC: 7.1 MG/DL — HIGH (ref 0.2–1.2)
BUN SERPL-MCNC: 12 MG/DL — SIGNIFICANT CHANGE UP (ref 7–23)
CALCIUM SERPL-MCNC: 8.6 MG/DL — SIGNIFICANT CHANGE UP (ref 8.4–10.5)
CHLORIDE SERPL-SCNC: 106 MMOL/L — SIGNIFICANT CHANGE UP (ref 96–108)
CO2 SERPL-SCNC: 22 MMOL/L — SIGNIFICANT CHANGE UP (ref 22–31)
CREAT SERPL-MCNC: 0.99 MG/DL — SIGNIFICANT CHANGE UP (ref 0.5–1.3)
EOSINOPHIL # BLD AUTO: 0.22 K/UL — SIGNIFICANT CHANGE UP (ref 0–0.5)
EOSINOPHIL NFR BLD AUTO: 4.5 % — SIGNIFICANT CHANGE UP (ref 0–6)
GLUCOSE BLDC GLUCOMTR-MCNC: 133 MG/DL — HIGH (ref 70–99)
GLUCOSE BLDC GLUCOMTR-MCNC: 157 MG/DL — HIGH (ref 70–99)
GLUCOSE BLDC GLUCOMTR-MCNC: 178 MG/DL — HIGH (ref 70–99)
GLUCOSE BLDC GLUCOMTR-MCNC: 182 MG/DL — HIGH (ref 70–99)
GLUCOSE SERPL-MCNC: 130 MG/DL — HIGH (ref 70–99)
HCT VFR BLD CALC: 22.4 % — LOW (ref 39–50)
HGB BLD-MCNC: 7.5 G/DL — LOW (ref 13–17)
INR BLD: 2.64 RATIO — HIGH (ref 0.88–1.16)
LYMPHOCYTES # BLD AUTO: 0.97 K/UL — LOW (ref 1–3.3)
LYMPHOCYTES # BLD AUTO: 19.6 % — SIGNIFICANT CHANGE UP (ref 13–44)
MANUAL SMEAR VERIFICATION: SIGNIFICANT CHANGE UP
MCHC RBC-ENTMCNC: 33.5 GM/DL — SIGNIFICANT CHANGE UP (ref 32–36)
MCHC RBC-ENTMCNC: 35 PG — HIGH (ref 27–34)
MCV RBC AUTO: 104.7 FL — HIGH (ref 80–100)
METAMYELOCYTES # FLD: 0.9 % — HIGH (ref 0–0)
MONOCYTES # BLD AUTO: 0.27 K/UL — SIGNIFICANT CHANGE UP (ref 0–0.9)
MONOCYTES NFR BLD AUTO: 5.4 % — SIGNIFICANT CHANGE UP (ref 2–14)
NEUTROPHILS # BLD AUTO: 3.39 K/UL — SIGNIFICANT CHANGE UP (ref 1.8–7.4)
NEUTROPHILS NFR BLD AUTO: 68.7 % — SIGNIFICANT CHANGE UP (ref 43–77)
PLAT MORPH BLD: NORMAL — SIGNIFICANT CHANGE UP
PLATELET # BLD AUTO: 61 K/UL — LOW (ref 150–400)
POTASSIUM SERPL-MCNC: 4.3 MMOL/L — SIGNIFICANT CHANGE UP (ref 3.5–5.3)
POTASSIUM SERPL-SCNC: 4.3 MMOL/L — SIGNIFICANT CHANGE UP (ref 3.5–5.3)
PROT SERPL-MCNC: 5.1 G/DL — LOW (ref 6–8.3)
PROTHROM AB SERPL-ACNC: 30.7 SEC — HIGH (ref 10–13.1)
RBC # BLD: 2.14 M/UL — LOW (ref 4.2–5.8)
RBC # FLD: 20.8 % — HIGH (ref 10.3–14.5)
RBC BLD AUTO: SIGNIFICANT CHANGE UP
SODIUM SERPL-SCNC: 136 MMOL/L — SIGNIFICANT CHANGE UP (ref 135–145)
WBC # BLD: 4.93 K/UL — SIGNIFICANT CHANGE UP (ref 3.8–10.5)
WBC # FLD AUTO: 4.93 K/UL — SIGNIFICANT CHANGE UP (ref 3.8–10.5)

## 2020-04-28 PROCEDURE — 99232 SBSQ HOSP IP/OBS MODERATE 35: CPT

## 2020-04-28 PROCEDURE — 99233 SBSQ HOSP IP/OBS HIGH 50: CPT

## 2020-04-28 RX ORDER — LACTULOSE 10 G/15ML
20 SOLUTION ORAL EVERY 8 HOURS
Refills: 0 | Status: DISCONTINUED | OUTPATIENT
Start: 2020-04-28 | End: 2020-04-30

## 2020-04-28 RX ORDER — FUROSEMIDE 40 MG
40 TABLET ORAL
Refills: 0 | Status: DISCONTINUED | OUTPATIENT
Start: 2020-04-28 | End: 2020-04-30

## 2020-04-28 RX ORDER — ALBUMIN HUMAN 25 %
50 VIAL (ML) INTRAVENOUS EVERY 8 HOURS
Refills: 0 | Status: COMPLETED | OUTPATIENT
Start: 2020-04-28 | End: 2020-04-29

## 2020-04-28 RX ADMIN — ERTAPENEM SODIUM 120 MILLIGRAM(S): 1 INJECTION, POWDER, LYOPHILIZED, FOR SOLUTION INTRAMUSCULAR; INTRAVENOUS at 06:39

## 2020-04-28 RX ADMIN — Medication 1: at 12:01

## 2020-04-28 RX ADMIN — Medication 1: at 17:50

## 2020-04-28 RX ADMIN — MIDODRINE HYDROCHLORIDE 20 MILLIGRAM(S): 2.5 TABLET ORAL at 17:55

## 2020-04-28 RX ADMIN — Medication 50 MILLILITER(S): at 18:24

## 2020-04-28 RX ADMIN — Medication 40 MILLIGRAM(S): at 13:39

## 2020-04-28 RX ADMIN — Medication 1 MILLIGRAM(S): at 12:02

## 2020-04-28 RX ADMIN — Medication 40 MILLIGRAM(S): at 18:28

## 2020-04-28 RX ADMIN — LACTULOSE 30 GRAM(S): 10 SOLUTION ORAL at 06:39

## 2020-04-28 RX ADMIN — PANTOPRAZOLE SODIUM 40 MILLIGRAM(S): 20 TABLET, DELAYED RELEASE ORAL at 06:40

## 2020-04-28 RX ADMIN — Medication 100 MILLIGRAM(S): at 08:15

## 2020-04-28 RX ADMIN — TAMSULOSIN HYDROCHLORIDE 0.4 MILLIGRAM(S): 0.4 CAPSULE ORAL at 21:52

## 2020-04-28 RX ADMIN — MIDODRINE HYDROCHLORIDE 20 MILLIGRAM(S): 2.5 TABLET ORAL at 12:06

## 2020-04-28 RX ADMIN — Medication 50 MILLILITER(S): at 12:26

## 2020-04-28 RX ADMIN — Medication 100 MILLIGRAM(S): at 17:51

## 2020-04-28 RX ADMIN — MIDODRINE HYDROCHLORIDE 20 MILLIGRAM(S): 2.5 TABLET ORAL at 06:41

## 2020-04-28 RX ADMIN — LACTULOSE 20 GRAM(S): 10 SOLUTION ORAL at 21:52

## 2020-04-28 NOTE — PROGRESS NOTE ADULT - SUBJECTIVE AND OBJECTIVE BOX
Patient is a 64y old  Male who presents with a chief complaint of Confusion (28 Apr 2020 07:40)      INTERVAL History of Present Illness/OVERNIGHT EVENTS: AMS resolved  feels better    MEDICATIONS  (STANDING):  albumin human 25% IVPB 50 milliLiter(s) IV Intermittent every 8 hours  dextrose 5%. 1000 milliLiter(s) (50 mL/Hr) IV Continuous <Continuous>  dextrose 50% Injectable 12.5 Gram(s) IV Push once  dextrose 50% Injectable 25 Gram(s) IV Push once  dextrose 50% Injectable 25 Gram(s) IV Push once  ertapenem  IVPB 1000 milliGRAM(s) IV Intermittent every 24 hours  folic acid 1 milliGRAM(s) Oral daily  furosemide   Injectable 40 milliGRAM(s) IV Push two times a day  insulin lispro (HumaLOG) corrective regimen sliding scale   SubCutaneous three times a day before meals  insulin lispro (HumaLOG) corrective regimen sliding scale   SubCutaneous at bedtime  lactulose Syrup 20 Gram(s) Oral every 8 hours  midodrine. 20 milliGRAM(s) Oral three times a day  nitrofurantoin monohydrate/macrocrystals (MACROBID) 100 milliGRAM(s) Oral two times a day with meals  pantoprazole    Tablet 40 milliGRAM(s) Oral before breakfast  rifAXIMin 550 milliGRAM(s) Oral two times a day  tamsulosin 0.4 milliGRAM(s) Oral at bedtime    MEDICATIONS  (PRN):  acetaminophen   Tablet .. 500 milliGRAM(s) Oral every 6 hours PRN Temp greater or equal to 38C (100.4F), Mild Pain (1 - 3)  dextrose 40% Gel 15 Gram(s) Oral once PRN Blood Glucose LESS THAN 70 milliGRAM(s)/deciliter  glucagon  Injectable 1 milliGRAM(s) IntraMuscular once PRN Glucose LESS THAN 70 milligrams/deciliter      Allergies    codeine (Anaphylaxis)    Intolerances        REVIEW OF SYSTEMS:  Negative unless otherwise specified above.    Vital Signs Last 24 Hrs  T(C): 36.7 (28 Apr 2020 12:35), Max: 36.9 (28 Apr 2020 01:04)  T(F): 98 (28 Apr 2020 12:35), Max: 98.4 (28 Apr 2020 01:04)  HR: 80 (28 Apr 2020 12:35) (80 - 90)  BP: 120/62 (28 Apr 2020 12:35) (107/53 - 133/66)  BP(mean): --  RR: 20 (28 Apr 2020 12:35) (18 - 20)  SpO2: 95% (28 Apr 2020 12:35) (95% - 96%)    Height (cm): 185.42 (04-27 @ 14:53)  Weight (kg): 117.9 (04-27 @ 14:53)  BMI (kg/m2): 34.3 (04-27 @ 14:53)  BSA (m2): 2.41 (04-27 @ 14:53)    PHYSICAL EXAM:  GENERAL: No apparent distress, appears comfortable but chronically ill  HEAD:  Atraumatic, Normocephalic  EYES: Conjunctiva and sclera clear, no discharge  ENMT: Moist mucous membranes, no nasal discharge  NECK: Supple, no JVD  CHEST/LUNG: Bilateral air entry  HEART: Regular rhythm  ABDOMEN: Obese, non-tender  EXTREMITIES:  2+ leg edema bilaterally  SKIN: Pressure ulcer stage 3 right heel, no purulence  NERVOUS SYSTEM:  Alert & Oriented; Bilateral Lower extremity mobile, sensation to light touch intact      LABS:                        7.5    4.93  )-----------( 61       ( 28 Apr 2020 06:38 )             22.4     28 Apr 2020 06:38    136    |  106    |  12     ----------------------------<  130    4.3     |  22     |  0.99     Ca    8.6        28 Apr 2020 06:38    TPro  5.1    /  Alb  2.3    /  TBili  7.1    /  DBili  x      /  AST  25     /  ALT  15     /  AlkPhos  112    28 Apr 2020 06:38    PT/INR - ( 28 Apr 2020 08:17 )   PT: 30.7 sec;   INR: 2.64 ratio             CAPILLARY BLOOD GLUCOSE      POCT Blood Glucose.: 178 mg/dL (28 Apr 2020 11:45)  POCT Blood Glucose.: 133 mg/dL (28 Apr 2020 08:03)  POCT Blood Glucose.: 180 mg/dL (27 Apr 2020 22:24)  POCT Blood Glucose.: 201 mg/dL (27 Apr 2020 17:07)      RADIOLOGY & ADDITIONAL TESTS:    Images reviewed personally    Consultant Notes Reviewed and Care Discussed with relevant Consultants/Other Providers.

## 2020-04-28 NOTE — CHART NOTE - NSCHARTNOTEFT_GEN_A_CORE
Wound Care Team Note:    Request for wound care team consult received for foot wound and referred to wound care team Podiatrist, Dr. Wang. Will defer to Dr. Wang for wound management.    Tami Monroe NP-C, [facility] [id number]

## 2020-04-28 NOTE — PROGRESS NOTE ADULT - PROBLEM SELECTOR PLAN 8
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  Macrocytosis and increased indirect component of hyperbilirubinemia consistent with hemolysis secondary to spur cell anemia.  B12, FA, and TSH all within normal limits.  No overt GI bleeding since admission and hemodynamically stable.  Transfusing 1 unit PRBCs 4/26 (to keep Hb >7).  daily CBC  Would benefit from eventual elective (non-emergent) repeat EGD to attempt repeat endoscopic therapy of GAVE and any AVMs to decrease chronic GI blood loss, but will defer for now given COVID-19 pandemic (which is limiting non-emergent procedures) as well as active infection and HE which increase his danilo-procedural risks. However, if overt bleeding develops or if not responding appropriate to PRBC transfusion, then would proceed with inpatient EGD.  f/up heme recs

## 2020-04-28 NOTE — PROGRESS NOTE ADULT - ATTENDING COMMENTS
PT recommends [facility].  spoke to wife - she is ok with [facility] (Pref[facility]) as patient has limited help at home.  possible discharge later this week  patient with multiple co-morbid conditions; high risk for future complications despite optimal medical therapy

## 2020-04-28 NOTE — PROGRESS NOTE ADULT - SUBJECTIVE AND OBJECTIVE BOX
Follow Up: encephalopathy    Interval History: afebrile. more alert today.     REVIEW OF SYSTEMS  [  ] ROS unobtainable because:    [ x ] All other systems negative except as noted below    Constitutional:  [ ] fever [ ] chills  [ ] weight loss  [ ] weakness  Skin:  [ ] rash [ ] phlebitis	  Eyes: [ ] icterus [ ] pain  [ ] discharge	  ENMT: [ ] sore throat  [ ] thrush [ ] ulcers [ ] exudates  Respiratory: [ ] dyspnea [ ] hemoptysis [ ] cough [ ] sputum	  Cardiovascular:  [ ] chest pain [ ] palpitations [ ] edema	  Gastrointestinal:  [ ] nausea [ ] vomiting [ ] diarrhea [ ] constipation [ ] pain	  Genitourinary:  [ ] dysuria [ ] frequency [ ] hematuria [ ] discharge [ ] flank pain  [ ] incontinence  Musculoskeletal:  [ ] myalgias [ ] arthralgias [ ] arthritis  [ ] back pain  Neurological:  [ ] headache [ ] seizures  [ ] confusion/altered mental status    Allergies  codeine (Anaphylaxis)        ANTIMICROBIALS:  ertapenem  IVPB 1000 every 24 hours  nitrofurantoin monohydrate/macrocrystals (MACROBID) 100 two times a day with meals  rifAXIMin 550 two times a day      OTHER MEDS:  MEDICATIONS  (STANDING):  acetaminophen   Tablet .. 500 every 6 hours PRN  dextrose 40% Gel 15 once PRN  dextrose 50% Injectable 12.5 once  dextrose 50% Injectable 25 once  dextrose 50% Injectable 25 once  furosemide   Injectable 40 two times a day  glucagon  Injectable 1 once PRN  insulin lispro (HumaLOG) corrective regimen sliding scale  three times a day before meals  insulin lispro (HumaLOG) corrective regimen sliding scale  at bedtime  lactulose Syrup 20 every 8 hours  midodrine. 20 three times a day  pantoprazole    Tablet 40 before breakfast  tamsulosin 0.4 at bedtime      Vital Signs Last 24 Hrs  T(C): 36.8 (28 Apr 2020 21:16), Max: 36.9 (28 Apr 2020 01:04)  T(F): 98.2 (28 Apr 2020 21:16), Max: 98.4 (28 Apr 2020 01:04)  HR: 85 (28 Apr 2020 21:16) (80 - 86)  BP: 124/64 (28 Apr 2020 21:16) (107/53 - 131/69)  BP(mean): --  RR: 20 (28 Apr 2020 21:16) (20 - 20)  SpO2: 94% (28 Apr 2020 21:16) (94% - 96%)    PHYSICAL EXAMINATION:  General: Alert and Awake, NAD, Jaundiced  HEENT: PERRL, EOMI, Scleral Icterus, Oropharynx Clear, MMM  Neck: Supple, No CHELSEA  Cardiac: RRR, No M/R/G  Resp: CTAB, No Wh/Rh/Ra  Abdomen: NBS, NT/ND, No HSM, No rigidity or guarding  MSK: R Heel superficial ulcer (no surrounding erythema or drainage), No LE edema. No stigmata of IE. No evidence of phlebitis. No evidence of synovitis.  : No bob  Skin: No rashes or lesions. Skin is warm and dry to the touch.   Neuro: Alert and Awake. CN 2-12 Grossly intact. Moves all four extremities spontaneously.  Psych: Calm, Pleasant, Cooperative                            7.5    4.93  )-----------( 61       ( 28 Apr 2020 06:38 )             22.4       04-28    136  |  106  |  12  ----------------------------<  130<H>  4.3   |  22  |  0.99    Ca    8.6      28 Apr 2020 06:38    TPro  5.1<L>  /  Alb  2.3<L>  /  TBili  7.1<H>  /  DBili  x   /  AST  25  /  ALT  15  /  AlkPhos  112  04-28          MICROBIOLOGY:  v  .Urine Clean Catch (Midstream)  04-25-20   >100,000 CFU/ml Enterococcus faecium (vancomycin resistant)  --  Enterococcus faecium (vancomycin resistant)      .Blood Blood-Venous  04-25-20   No growth to date.  --  --      .Blood Blood-Peripheral  04-25-20   No growth to date.  --  --        CMV IgG Antibody: <0.20 U/mL (12-04-19 @ 08:33)  Toxoplasma IgG Screen: <3.0 IU/mL (12-04-19 @ 08:33)          RADIOLOGY:    <The imaging below has been reviewed and visualized by me independently. Findings as detailed in report below>      EXAM:  CT ABDOMEN AND PELVIS IC                        PROCEDURE DATE:  04/25/2020    Resolution of prostate abscess seen on CT abdomen pelvis 3/12/2020.  Cirrhosis.  Small perihepatic, perisplenic, and pelvic ascites.  Nonobstructive 0.6 cm left intrarenal calculus.    EXAM:  XR CHEST PORTABLE URGENT 1V                        PROCEDURE DATE:  04/24/2020    The lungs are clear. There is no pleural effusion or pneumothorax. The cardiac silhouette is unremarkable. No acute osseous abnormality.    EXAM:  MR FOOT RT                        PROCEDURE DATE:  04/26/2020    Limited exam. No evidence of osteomyelitis.  Diffuse subcutaneous soft tissue infiltration which may be secondary to edema or cellulitis.  Advanced second tar

## 2020-04-28 NOTE — PROGRESS NOTE ADULT - PROBLEM SELECTOR PLAN 2
Appreciate Urology input.  Repeat CT (4/25) with resolution of prostatic abscess.  Voiding well after prior indwelling catheter removed as outpatient on 4/14/20,   Urinalysis (4/25) with negative nitrite but +149 wbc and +50 rbc as well as many bacteria.  Urine culture (4/25) with >100k gram positive organisms, ?Enterococcus, though previously had ESBL E. coli. Will follow-up speciation/sensitivites.  Blood cultures (4/25 x2) with NGTD.  Pending urine culture speciation/sensitivities, will continue empiric ertapenem (4/25- ), chosen given prior recent ESBL E. coli UTIs.  Transplant ID consulted, appreciate recommendations.  defer abx to ID

## 2020-04-28 NOTE — PROGRESS NOTE ADULT - SUBJECTIVE AND OBJECTIVE BOX
INTERVAL HPI/OVERNIGHT EVENTS:    patient seen and examined earlier this morning  resting comfortably in bed  c/o R foot pain   mentating at baseline; AAO x 3   tolerating PO  denies fever, chills, nausea, vomiting, abdominal pain, dysuria, hematuria, melena, hematochezia    MEDICATIONS  (STANDING):  albumin human 25% IVPB 50 milliLiter(s) IV Intermittent every 8 hours  dextrose 5%. 1000 milliLiter(s) (50 mL/Hr) IV Continuous <Continuous>  dextrose 50% Injectable 12.5 Gram(s) IV Push once  dextrose 50% Injectable 25 Gram(s) IV Push once  dextrose 50% Injectable 25 Gram(s) IV Push once  ertapenem  IVPB 1000 milliGRAM(s) IV Intermittent every 24 hours  folic acid 1 milliGRAM(s) Oral daily  furosemide   Injectable 40 milliGRAM(s) IV Push two times a day  insulin lispro (HumaLOG) corrective regimen sliding scale   SubCutaneous three times a day before meals  insulin lispro (HumaLOG) corrective regimen sliding scale   SubCutaneous at bedtime  lactulose Syrup 20 Gram(s) Oral every 8 hours  midodrine. 20 milliGRAM(s) Oral three times a day  nitrofurantoin monohydrate/macrocrystals (MACROBID) 100 milliGRAM(s) Oral two times a day with meals  pantoprazole    Tablet 40 milliGRAM(s) Oral before breakfast  rifAXIMin 550 milliGRAM(s) Oral two times a day  tamsulosin 0.4 milliGRAM(s) Oral at bedtime    MEDICATIONS  (PRN):  acetaminophen   Tablet .. 500 milliGRAM(s) Oral every 6 hours PRN Temp greater or equal to 38C (100.4F), Mild Pain (1 - 3)  dextrose 40% Gel 15 Gram(s) Oral once PRN Blood Glucose LESS THAN 70 milliGRAM(s)/deciliter  glucagon  Injectable 1 milliGRAM(s) IntraMuscular once PRN Glucose LESS THAN 70 milligrams/deciliter      Allergies    codeine (Anaphylaxis)    Intolerances        Review of Systems:    General:  No wt loss, fevers, chills, night sweats,fatigue,   Eyes:  Good vision, no reported pain  ENT:  No sore throat, pain, runny nose, dysphagia  CV:  No pain, palpitatioins, hypo/hypertension  Resp:  No dyspnea, cough, tachypnea, wheezing  GI:  No pain, No nausea, No vomiting, No diarrhea, No constipatiion, No weight loss, No fever, No pruritis, No rectal bleeding, No tarry stools, No dysphagia,  :  No pain, bleeding, incontinence, nocturia  Muscle:  No pain, weakness  Neuro:  No weakness, tingling, memory problems  Psych:  No fatigue, insomnia, mood problems, depression  Endocrine:  No polyuria, polydypsia, cold/heat intolerance  Heme:  No petechiae, ecchymosis, easy bruisability  Skin:  No rash, tattoos, scars, edema      Vital Signs Last 24 Hrs  T(C): 36.7 (28 Apr 2020 12:35), Max: 36.9 (28 Apr 2020 01:04)  T(F): 98 (28 Apr 2020 12:35), Max: 98.4 (28 Apr 2020 01:04)  HR: 80 (28 Apr 2020 12:35) (80 - 90)  BP: 120/62 (28 Apr 2020 12:35) (107/53 - 133/66)  BP(mean): --  RR: 20 (28 Apr 2020 12:35) (18 - 20)  SpO2: 95% (28 Apr 2020 12:35) (95% - 96%)    PHYSICAL EXAM:    Constitutional: NAD  HEENT: ncat   Neck: No LAD, supple  Respiratory: respirations unlabored  Cardiovascular: S1 and S2, RRR, no M  Gastrointestinal: obese, BS+, soft, NT/ND  Extremities: +peripheral edema  Neurological: A/O x 3, no focal deficits      LABS:                        7.5    4.93  )-----------( 61       ( 28 Apr 2020 06:38 )             22.4     04-28    136  |  106  |  12  ----------------------------<  130<H>  4.3   |  22  |  0.99    Ca    8.6      28 Apr 2020 06:38    TPro  5.1<L>  /  Alb  2.3<L>  /  TBili  7.1<H>  /  DBili  x   /  AST  25  /  ALT  15  /  AlkPhos  112  04-28    PT/INR - ( 28 Apr 2020 08:17 )   PT: 30.7 sec;   INR: 2.64 ratio               RADIOLOGY & ADDITIONAL TESTS:

## 2020-04-28 NOTE — PROGRESS NOTE ADULT - ATTENDING COMMENTS
Hepatology Staff: Yomi Medina MD    I saw and examined the patient along with  Dr. Abdi 04-28-20 @ 09:35.    Patient Medical Record, hosptial course was reviewed and summarized as below:    Vitals: Vital Signs Last 24 Hrs  T(C): 36.8 (28 Apr 2020 05:17), Max: 36.9 (28 Apr 2020 01:04)  T(F): 98.3 (28 Apr 2020 05:17), Max: 98.4 (28 Apr 2020 01:04)  HR: 84 (28 Apr 2020 05:17) (84 - 90)  BP: 107/53 (28 Apr 2020 05:17) (107/53 - 133/66)  BP(mean): --  RR: 20 (28 Apr 2020 05:17) (18 - 20)  SpO2: 95% (28 Apr 2020 05:17) (95% - 96%)    Antibiotics:nitrofurantoin monohydrate/macrocrystals (MACROBID) 100 milliGRAM(s) Oral two times a day with meals    Diuretics:  Labs:INR: 2.64 ratio (04-28-20 @ 08:17)  Creatinine, Serum: 0.99 mg/dL (04-28-20 @ 06:38)  Bilirubin Total, Serum: 7.1 mg/dL (04-28-20 @ 06:38)    I/O: I&O's Summary    27 Apr 2020 07:01  -  28 Apr 2020 07:00  --------------------------------------------------------  IN: 720 mL / OUT: 575 mL / NET: 145 mL      Nutritional Status: Weight (kg): 117.9 (04-27-20 @ 14:53)  BMI (kg/m2): 34.3 (04-27-20 @ 14:53)  Albumin, Serum: 2.3 g/dL (04-28-20 @ 06:38)    Last 24 hour events: Overall getting better, resolved HE.    Recommendations: Keep on IV Ertapenem, and Macrobid. Resolved prostatic abscess on CT. Recommend PT evaluation. Disposition planning based on podiatry and ID recommendations. MELD score 25.   Would recommend IV Lasix 40 mg bid  ( x 2 doses and then reassess) along IV Albumin 25% 25 g q 8hrs.  Patient is listed on the [facility] list for Liver Transplant.      Plan discussed with Primary team.

## 2020-04-28 NOTE — PROGRESS NOTE ADULT - SUBJECTIVE AND OBJECTIVE BOX
Chief Complaint: Confusion    Interval Events:   - The patient has no new complaints this morning    Allergies:  codeine (Anaphylaxis)    MEDICATIONS  (STANDING):  dextrose 5%. 1000 milliLiter(s) (50 mL/Hr) IV Continuous <Continuous>  dextrose 50% Injectable 12.5 Gram(s) IV Push once  dextrose 50% Injectable 25 Gram(s) IV Push once  dextrose 50% Injectable 25 Gram(s) IV Push once  ertapenem  IVPB 1000 milliGRAM(s) IV Intermittent every 24 hours  folic acid 1 milliGRAM(s) Oral daily  insulin lispro (HumaLOG) corrective regimen sliding scale   SubCutaneous three times a day before meals  insulin lispro (HumaLOG) corrective regimen sliding scale   SubCutaneous at bedtime  lactulose Syrup 30 Gram(s) Oral every 6 hours  midodrine. 20 milliGRAM(s) Oral three times a day  nitrofurantoin monohydrate/macrocrystals (MACROBID) 100 milliGRAM(s) Oral two times a day with meals  pantoprazole    Tablet 40 milliGRAM(s) Oral before breakfast  rifAXIMin 550 milliGRAM(s) Oral two times a day  tamsulosin 0.4 milliGRAM(s) Oral at bedtime    MEDICATIONS  (PRN):  acetaminophen   Tablet .. 500 milliGRAM(s) Oral every 6 hours PRN Temp greater or equal to 38C (100.4F), Mild Pain (1 - 3)  dextrose 40% Gel 15 Gram(s) Oral once PRN Blood Glucose LESS THAN 70 milliGRAM(s)/deciliter  glucagon  Injectable 1 milliGRAM(s) IntraMuscular once PRN Glucose LESS THAN 70 milligrams/deciliter    PMHX/PSHX:  GIB (gastrointestinal bleeding)  GERD with esophagitis  Hepatic encephalopathy  Obesity  Fatty liver disease, nonalcoholic  Renal stones  Hypertension  Neuropathy  Hypercholesteremia  Diabetes  S/P cholecystectomy    Family history:  Family history of type 2 diabetes mellitus  Family history of hypertension  Family history of stomach cancer  No pertinent family history in first degree relatives    ROS:     General:  No wt loss, fevers, chills, night sweats, fatigue,   Eyes:  Good vision, no reported pain  ENT:  No sore throat, pain, runny nose, dysphagia  CV:  No pain, palpitations, hypo/hypertension  Pulm:  No dyspnea, cough, tachypnea, wheezing  GI:  No pain, No nausea, No vomiting, No diarrhea, No constipation, No weight loss, No fever, No pruritis, No rectal bleeding, No tarry stools, No dysphagia  :  No pain, bleeding, incontinence, nocturia  Muscle:  No pain, weakness  Neuro:  No weakness, tingling, memory problems  Psych:  No fatigue, insomnia, mood problems, depression  Endocrine:  No polyuria, polydipsia, cold/heat intolerance  Heme:  No petechiae, ecchymosis, easy bruisability  Skin:  No rash, tattoos, scars, edema    PHYSICAL EXAM:   Vital Signs:  Vital Signs Last 24 Hrs  T(C): 36.8 (28 Apr 2020 05:17), Max: 36.9 (28 Apr 2020 01:04)  T(F): 98.3 (28 Apr 2020 05:17), Max: 98.4 (28 Apr 2020 01:04)  HR: 84 (28 Apr 2020 05:17) (84 - 90)  BP: 107/53 (28 Apr 2020 05:17) (107/53 - 133/66)  BP(mean): --  RR: 20 (28 Apr 2020 05:17) (18 - 20)  SpO2: 95% (28 Apr 2020 05:17) (95% - 97%)  Daily Height in cm: 185.42 (27 Apr 2020 14:53)      GENERAL:  No acute distress, obese  HEENT:  Normocephalic/atraumatic,  no scleral icterus  CHEST: Normal effort, no accessory muscle use  ABDOMEN:  Soft, non-tender, non-distended, normoactive bowel sounds  EXTREMITIES:  No cyanosis, clubbing, or edema  SKIN:  No rash/erythema  NEURO:  Alert and oriented x 3, no asterixis, no tremor    LABS:                        7.3    4.63  )-----------( 72       ( 27 Apr 2020 05:54 )             22.3     04-28    136  |  106  |  12  ----------------------------<  130<H>  4.3   |  22  |  0.99    Ca    8.6      28 Apr 2020 06:38    TPro  5.1<L>  /  Alb  2.3<L>  /  TBili  7.1<H>  /  DBili  x   /  AST  25  /  ALT  15  /  AlkPhos  112  04-28    LIVER FUNCTIONS - ( 28 Apr 2020 06:38 )  Alb: 2.3 g/dL / Pro: 5.1 g/dL / ALK PHOS: 112 U/L / ALT: 15 U/L / AST: 25 U/L / GGT: x           PT/INR - ( 27 Apr 2020 07:31 )   PT: 28.7 sec;   INR: 2.45 ratio      Imaging:    Reviewed

## 2020-04-29 LAB
ALBUMIN SERPL ELPH-MCNC: 2.8 G/DL — LOW (ref 3.3–5)
ALP SERPL-CCNC: 116 U/L — SIGNIFICANT CHANGE UP (ref 40–120)
ALT FLD-CCNC: 14 U/L — SIGNIFICANT CHANGE UP (ref 10–45)
ANION GAP SERPL CALC-SCNC: 8 MMOL/L — SIGNIFICANT CHANGE UP (ref 5–17)
AST SERPL-CCNC: 24 U/L — SIGNIFICANT CHANGE UP (ref 10–40)
BASOPHILS # BLD AUTO: 0.03 K/UL — SIGNIFICANT CHANGE UP (ref 0–0.2)
BASOPHILS NFR BLD AUTO: 0.7 % — SIGNIFICANT CHANGE UP (ref 0–2)
BILIRUB SERPL-MCNC: 7.6 MG/DL — HIGH (ref 0.2–1.2)
BUN SERPL-MCNC: 15 MG/DL — SIGNIFICANT CHANGE UP (ref 7–23)
CALCIUM SERPL-MCNC: 8.7 MG/DL — SIGNIFICANT CHANGE UP (ref 8.4–10.5)
CHLORIDE SERPL-SCNC: 101 MMOL/L — SIGNIFICANT CHANGE UP (ref 96–108)
CO2 SERPL-SCNC: 26 MMOL/L — SIGNIFICANT CHANGE UP (ref 22–31)
CREAT SERPL-MCNC: 1.06 MG/DL — SIGNIFICANT CHANGE UP (ref 0.5–1.3)
EOSINOPHIL # BLD AUTO: 0.22 K/UL — SIGNIFICANT CHANGE UP (ref 0–0.5)
EOSINOPHIL NFR BLD AUTO: 4.9 % — SIGNIFICANT CHANGE UP (ref 0–6)
FERRITIN SERPL-MCNC: 493 NG/ML — HIGH (ref 30–400)
GLUCOSE BLDC GLUCOMTR-MCNC: 110 MG/DL — HIGH (ref 70–99)
GLUCOSE BLDC GLUCOMTR-MCNC: 164 MG/DL — HIGH (ref 70–99)
GLUCOSE BLDC GLUCOMTR-MCNC: 221 MG/DL — HIGH (ref 70–99)
GLUCOSE BLDC GLUCOMTR-MCNC: 249 MG/DL — HIGH (ref 70–99)
GLUCOSE SERPL-MCNC: 103 MG/DL — HIGH (ref 70–99)
HAPTOGLOB SERPL-MCNC: <20 MG/DL — LOW (ref 34–200)
HCT VFR BLD CALC: 23.7 % — LOW (ref 39–50)
HGB BLD-MCNC: 7.8 G/DL — LOW (ref 13–17)
IMM GRANULOCYTES NFR BLD AUTO: 0.7 % — SIGNIFICANT CHANGE UP (ref 0–1.5)
INR BLD: 2.62 RATIO — HIGH (ref 0.88–1.16)
IRON SATN MFR SERPL: 126 UG/DL — SIGNIFICANT CHANGE UP (ref 45–165)
IRON SATN MFR SERPL: SIGNIFICANT CHANGE UP % (ref 16–55)
LDH SERPL L TO P-CCNC: 199 U/L — SIGNIFICANT CHANGE UP (ref 50–242)
LYMPHOCYTES # BLD AUTO: 1.34 K/UL — SIGNIFICANT CHANGE UP (ref 1–3.3)
LYMPHOCYTES # BLD AUTO: 30 % — SIGNIFICANT CHANGE UP (ref 13–44)
MCHC RBC-ENTMCNC: 32.9 GM/DL — SIGNIFICANT CHANGE UP (ref 32–36)
MCHC RBC-ENTMCNC: 34.5 PG — HIGH (ref 27–34)
MCV RBC AUTO: 104.9 FL — HIGH (ref 80–100)
MONOCYTES # BLD AUTO: 0.49 K/UL — SIGNIFICANT CHANGE UP (ref 0–0.9)
MONOCYTES NFR BLD AUTO: 11 % — SIGNIFICANT CHANGE UP (ref 2–14)
NEUTROPHILS # BLD AUTO: 2.35 K/UL — SIGNIFICANT CHANGE UP (ref 1.8–7.4)
NEUTROPHILS NFR BLD AUTO: 52.7 % — SIGNIFICANT CHANGE UP (ref 43–77)
NRBC # BLD: 0 /100 WBCS — SIGNIFICANT CHANGE UP (ref 0–0)
PLATELET # BLD AUTO: 62 K/UL — LOW (ref 150–400)
POTASSIUM SERPL-MCNC: 3.6 MMOL/L — SIGNIFICANT CHANGE UP (ref 3.5–5.3)
POTASSIUM SERPL-SCNC: 3.6 MMOL/L — SIGNIFICANT CHANGE UP (ref 3.5–5.3)
PROT SERPL-MCNC: 5.2 G/DL — LOW (ref 6–8.3)
PROT SERPL-MCNC: 5.2 G/DL — LOW (ref 6–8.3)
PROT SERPL-MCNC: 5.6 G/DL — LOW (ref 6–8.3)
PROTHROM AB SERPL-ACNC: 31.1 SEC — HIGH (ref 10–13.1)
RBC # BLD: 2.26 M/UL — LOW (ref 4.2–5.8)
RBC # BLD: 2.3 M/UL — LOW (ref 4.2–5.8)
RBC # FLD: 19.9 % — HIGH (ref 10.3–14.5)
RETICS #: 89.2 K/UL — SIGNIFICANT CHANGE UP (ref 25–125)
RETICS/RBC NFR: 3.9 % — HIGH (ref 0.5–2.5)
SARS-COV-2 RNA SPEC QL NAA+PROBE: SIGNIFICANT CHANGE UP
SODIUM SERPL-SCNC: 135 MMOL/L — SIGNIFICANT CHANGE UP (ref 135–145)
TIBC SERPL-MCNC: SIGNIFICANT CHANGE UP UG/DL (ref 220–430)
UIBC SERPL-MCNC: <20 UG/DL — LOW (ref 110–370)
WBC # BLD: 4.46 K/UL — SIGNIFICANT CHANGE UP (ref 3.8–10.5)
WBC # FLD AUTO: 4.46 K/UL — SIGNIFICANT CHANGE UP (ref 3.8–10.5)

## 2020-04-29 PROCEDURE — 99233 SBSQ HOSP IP/OBS HIGH 50: CPT

## 2020-04-29 PROCEDURE — 99232 SBSQ HOSP IP/OBS MODERATE 35: CPT

## 2020-04-29 RX ORDER — NITROFURANTOIN MACROCRYSTAL 50 MG
100 CAPSULE ORAL
Refills: 0 | Status: DISCONTINUED | OUTPATIENT
Start: 2020-04-29 | End: 2020-04-30

## 2020-04-29 RX ADMIN — LACTULOSE 20 GRAM(S): 10 SOLUTION ORAL at 06:26

## 2020-04-29 RX ADMIN — Medication 100 MILLIGRAM(S): at 17:24

## 2020-04-29 RX ADMIN — MIDODRINE HYDROCHLORIDE 20 MILLIGRAM(S): 2.5 TABLET ORAL at 06:21

## 2020-04-29 RX ADMIN — Medication 40 MILLIGRAM(S): at 17:24

## 2020-04-29 RX ADMIN — Medication 1: at 17:23

## 2020-04-29 RX ADMIN — TAMSULOSIN HYDROCHLORIDE 0.4 MILLIGRAM(S): 0.4 CAPSULE ORAL at 21:01

## 2020-04-29 RX ADMIN — Medication 40 MILLIGRAM(S): at 06:32

## 2020-04-29 RX ADMIN — LACTULOSE 20 GRAM(S): 10 SOLUTION ORAL at 21:01

## 2020-04-29 RX ADMIN — Medication 50 MILLILITER(S): at 02:51

## 2020-04-29 RX ADMIN — MIDODRINE HYDROCHLORIDE 20 MILLIGRAM(S): 2.5 TABLET ORAL at 17:24

## 2020-04-29 RX ADMIN — Medication 2: at 12:12

## 2020-04-29 RX ADMIN — LACTULOSE 20 GRAM(S): 10 SOLUTION ORAL at 12:12

## 2020-04-29 RX ADMIN — PANTOPRAZOLE SODIUM 40 MILLIGRAM(S): 20 TABLET, DELAYED RELEASE ORAL at 06:21

## 2020-04-29 RX ADMIN — MIDODRINE HYDROCHLORIDE 20 MILLIGRAM(S): 2.5 TABLET ORAL at 12:12

## 2020-04-29 RX ADMIN — Medication 100 MILLIGRAM(S): at 09:05

## 2020-04-29 RX ADMIN — ERTAPENEM SODIUM 120 MILLIGRAM(S): 1 INJECTION, POWDER, LYOPHILIZED, FOR SOLUTION INTRAMUSCULAR; INTRAVENOUS at 06:20

## 2020-04-29 NOTE — PROGRESS NOTE ADULT - SUBJECTIVE AND OBJECTIVE BOX
PROGRESS NOTE:   Authoted by Dr. Noemi Doyle MD  Pager [phone]     Patient is a 64y old  Male who presents with a chief complaint of confusion (29 Apr 2020 12:17)      SUBJECTIVE / OVERNIGHT EVENTS: Patient seen and examined at bedside - patient feels much better, says that he was able to work with PT (with boot) and was able to move around, moving bowels (3 today), feels stronger. Still has RLE pain at the area of the heel, worse with movement.     ADDITIONAL REVIEW OF SYSTEMS: as above    MEDICATIONS  (STANDING):  dextrose 5%. 1000 milliLiter(s) (50 mL/Hr) IV Continuous <Continuous>  dextrose 50% Injectable 12.5 Gram(s) IV Push once  dextrose 50% Injectable 25 Gram(s) IV Push once  dextrose 50% Injectable 25 Gram(s) IV Push once  ertapenem  IVPB 1000 milliGRAM(s) IV Intermittent every 24 hours  furosemide   Injectable 40 milliGRAM(s) IV Push two times a day  insulin lispro (HumaLOG) corrective regimen sliding scale   SubCutaneous three times a day before meals  insulin lispro (HumaLOG) corrective regimen sliding scale   SubCutaneous at bedtime  lactulose Syrup 20 Gram(s) Oral every 8 hours  midodrine. 20 milliGRAM(s) Oral three times a day  nitrofurantoin monohydrate/macrocrystals (MACROBID) 100 milliGRAM(s) Oral two times a day with meals  pantoprazole    Tablet 40 milliGRAM(s) Oral before breakfast  rifAXIMin 550 milliGRAM(s) Oral two times a day  tamsulosin 0.4 milliGRAM(s) Oral at bedtime    MEDICATIONS  (PRN):  acetaminophen   Tablet .. 500 milliGRAM(s) Oral every 6 hours PRN Temp greater or equal to 38C (100.4F), Mild Pain (1 - 3)  dextrose 40% Gel 15 Gram(s) Oral once PRN Blood Glucose LESS THAN 70 milliGRAM(s)/deciliter  glucagon  Injectable 1 milliGRAM(s) IntraMuscular once PRN Glucose LESS THAN 70 milligrams/deciliter      CAPILLARY BLOOD GLUCOSE      POCT Blood Glucose.: 221 mg/dL (29 Apr 2020 11:31)  POCT Blood Glucose.: 110 mg/dL (29 Apr 2020 07:51)  POCT Blood Glucose.: 182 mg/dL (28 Apr 2020 21:21)  POCT Blood Glucose.: 157 mg/dL (28 Apr 2020 17:12)    I&O's Summary    28 Apr 2020 07:01  -  29 Apr 2020 07:00  --------------------------------------------------------  IN: 120 mL / OUT: 4050 mL / NET: -3930 mL    29 Apr 2020 07:01  -  29 Apr 2020 13:01  --------------------------------------------------------  IN: 0 mL / OUT: 1700 mL / NET: -1700 mL        PHYSICAL EXAM:  Vital Signs Last 24 Hrs  T(C): 36.2 (29 Apr 2020 09:05), Max: 36.8 (28 Apr 2020 21:16)  T(F): 97.2 (29 Apr 2020 09:05), Max: 98.2 (28 Apr 2020 21:16)  HR: 84 (29 Apr 2020 11:00) (78 - 85)  BP: 118/54 (29 Apr 2020 11:00) (110/57 - 131/69)  BP(mean): --  RR: 18 (29 Apr 2020 11:00) (18 - 20)  SpO2: 98% (29 Apr 2020 11:00) (94% - 98%)    PHYSICAL EXAM:  GENERAL: No apparent distress, appears comfortable but chronically ill  HEAD:  Atraumatic, Normocephalic  EYES: Conjunctiva and sclera clear, no discharge  ENMT: Moist mucous membranes, no nasal discharge  NECK: Supple, no JVD  CHEST/LUNG: Bilateral air entry  HEART: Regular rhythm  ABDOMEN: Obese, non-tender  EXTREMITIES:  2+ leg edema bilaterally  SKIN: Pressure ulcer stage 3 right heel, no purulence  NERVOUS SYSTEM:  Alert & Oriented; Bilateral Lower extremity mobile, sensation to light touch intact    LABS:                        7.8    4.46  )-----------( 62       ( 29 Apr 2020 07:11 )             23.7     04-29    135  |  101  |  15  ----------------------------<  103<H>  3.6   |  26  |  1.06    Ca    8.7      29 Apr 2020 07:11    TPro  5.2<L>  /  Alb  x   /  TBili  x   /  DBili  x   /  AST  x   /  ALT  x   /  AlkPhos  x   04-29    PT/INR - ( 29 Apr 2020 08:03 )   PT: 31.1 sec;   INR: 2.62 ratio                     RADIOLOGY & ADDITIONAL TESTS:  Results Reviewed:   Imaging Personally Reviewed:  Electrocardiogram Personally Reviewed:    COORDINATION OF CARE:  Care Discussed with Consultants/Other Providers [Y/N]:  Prior or Outpatient Records Reviewed [Y/N]:

## 2020-04-29 NOTE — PROGRESS NOTE ADULT - PROBLEM SELECTOR PLAN 2
Appreciate Urology input.  Repeat CT (4/25) with resolution of prostatic abscess.  Voiding well after prior indwelling catheter removed as outpatient on 4/14/20,   Urinalysis (4/25) with negative nitrite but +149 wbc and +50 rbc as well as many bacteria.  Urine culture (4/25) with >100k gram positive organisms, ?Enterococcus, though previously had ESBL E. coli. Will follow-up speciation/sensitivites.  Blood cultures (4/25 x2) with NGTD.  Pending urine culture speciation/sensitivities, will continue empiric ertapenem (4/25- ), chosen given prior recent ESBL E. coli UTIs.  Transplant ID consulted, appreciate recommendations.  defer abx to ID Appreciate Urology input.  Repeat CT (4/25) with resolution of prostatic abscess.  Voiding well after prior indwelling catheter removed as outpatient on 4/14/20,   Urinalysis (4/25) with negative nitrite but +149 wbc and +50 rbc as well as many bacteria.  Urine culture (4/25) with >100k gram positive organisms, Enterococcus faecium - sensitive to ertapenem  Blood cultures (4/25 x2) with NGTD.  Ertapenem (4/25-4/29), ESBL - enterococcus  Transplant ID consulted, appreciate recommendations. Appreciate Urology input.  Repeat CT (4/25) with resolution of prostatic abscess.  Voiding well after prior indwelling catheter removed as outpatient on 4/14/20,   Urinalysis (4/25) with negative nitrite but +149 wbc and +50 rbc as well as many bacteria.  Urine culture (4/25) with >100k gram positive organisms, Enterococcus faecium - sensitive to ertapenem  Blood cultures (4/25 x2) with NGTD.  Ertapenem (4/25-5/1), Macrobid 4/27-5/1- to complete 5 day course of each.  Transplant ID consulted, appreciate recommendations.

## 2020-04-29 NOTE — PROGRESS NOTE ADULT - SUBJECTIVE AND OBJECTIVE BOX
Follow Up:      Interval History:    REVIEW OF SYSTEMS  [  ] ROS unobtainable because:    [  ] All other systems negative except as noted below    Constitutional:  [ ] fever [ ] chills  [ ] weight loss  [ ] weakness  Skin:  [ ] rash [ ] phlebitis	  Eyes: [ ] icterus [ ] pain  [ ] discharge	  ENMT: [ ] sore throat  [ ] thrush [ ] ulcers [ ] exudates  Respiratory: [ ] dyspnea [ ] hemoptysis [ ] cough [ ] sputum	  Cardiovascular:  [ ] chest pain [ ] palpitations [ ] edema	  Gastrointestinal:  [ ] nausea [ ] vomiting [ ] diarrhea [ ] constipation [ ] pain	  Genitourinary:  [ ] dysuria [ ] frequency [ ] hematuria [ ] discharge [ ] flank pain  [ ] incontinence  Musculoskeletal:  [ ] myalgias [ ] arthralgias [ ] arthritis  [ ] back pain  Neurological:  [ ] headache [ ] seizures  [ ] confusion/altered mental status    Allergies  codeine (Anaphylaxis)        ANTIMICROBIALS:  ertapenem  IVPB 1000 every 24 hours  nitrofurantoin monohydrate/macrocrystals (MACROBID) 100 two times a day with meals  rifAXIMin 550 two times a day      OTHER MEDS:  MEDICATIONS  (STANDING):  acetaminophen   Tablet .. 500 every 6 hours PRN  dextrose 40% Gel 15 once PRN  dextrose 50% Injectable 12.5 once  dextrose 50% Injectable 25 once  dextrose 50% Injectable 25 once  furosemide   Injectable 40 two times a day  glucagon  Injectable 1 once PRN  insulin lispro (HumaLOG) corrective regimen sliding scale  three times a day before meals  insulin lispro (HumaLOG) corrective regimen sliding scale  at bedtime  lactulose Syrup 20 every 8 hours  midodrine. 20 three times a day  pantoprazole    Tablet 40 before breakfast  tamsulosin 0.4 at bedtime      Vital Signs Last 24 Hrs  T(C): 36.6 (29 Apr 2020 13:00), Max: 36.8 (28 Apr 2020 21:16)  T(F): 97.8 (29 Apr 2020 13:00), Max: 98.2 (28 Apr 2020 21:16)  HR: 80 (29 Apr 2020 13:00) (78 - 85)  BP: 125/54 (29 Apr 2020 13:00) (110/57 - 125/54)  BP(mean): --  RR: 20 (29 Apr 2020 13:00) (18 - 20)  SpO2: 97% (29 Apr 2020 13:00) (94% - 98%)    PHYSICAL EXAMINATION:  General: Alert and Awake, NAD  HEENT: PERRL, EOMI  Neck: Supple  Cardiac: RRR, No M/R/G  Resp: CTAB, No Wh/Rh/Ra  Abdomen: NBS, NT/ND, No HSM, No rigidity or guarding  MSK: No LE edema. No Calf tenderness  : No bob  Skin: No rashes or lesions. Skin is warm and dry to the touch.   Neuro: Alert and Awake. CN 2-12 Grossly intact. Moves all four extremities spontaneously.  Psych: Calm, Pleasant, Cooperative                          7.8    4.46  )-----------( 62       ( 29 Apr 2020 07:11 )             23.7       04-29    135  |  101  |  15  ----------------------------<  103<H>  3.6   |  26  |  1.06    Ca    8.7      29 Apr 2020 07:11    TPro  5.2<L>  /  Alb  x   /  TBili  x   /  DBili  x   /  AST  x   /  ALT  x   /  AlkPhos  x   04-29          MICROBIOLOGY:  v  .Urine Clean Catch (Midstream)  04-25-20   >100,000 CFU/ml Enterococcus faecium (vancomycin resistant)  --  Enterococcus faecium (vancomycin resistant)      .Blood Blood-Venous  04-25-20   No growth to date.  --  --      .Blood Blood-Peripheral  04-25-20   No growth to date.  --  --        CMV IgG Antibody: <0.20 U/mL (12-04-19 @ 08:33)  Toxoplasma IgG Screen: <3.0 IU/mL (12-04-19 @ 08:33)          RADIOLOGY:    <The imaging below has been reviewed and visualized by me independently. Findings as detailed in report below> Follow Up: encephalopathy    Interval History: afebrile. denies urinary symptoms. states he is thinking clearly and back to baseline.     REVIEW OF SYSTEMS  [  ] ROS unobtainable because:    [ x ] All other systems negative except as noted below    Constitutional:  [ ] fever [ ] chills  [ ] weight loss  [ ] weakness  Skin:  [ ] rash [ ] phlebitis	  Eyes: [ ] icterus [ ] pain  [ ] discharge	  ENMT: [ ] sore throat  [ ] thrush [ ] ulcers [ ] exudates  Respiratory: [ ] dyspnea [ ] hemoptysis [ ] cough [ ] sputum	  Cardiovascular:  [ ] chest pain [ ] palpitations [ ] edema	  Gastrointestinal:  [ ] nausea [ ] vomiting [ ] diarrhea [ ] constipation [ ] pain	  Genitourinary:  [ ] dysuria [ ] frequency [ ] hematuria [ ] discharge [ ] flank pain  [ ] incontinence  Musculoskeletal:  [ ] myalgias [ ] arthralgias [ ] arthritis  [ ] back pain  Neurological:  [ ] headache [ ] seizures  [ ] confusion/altered mental status    Allergies  codeine (Anaphylaxis)        ANTIMICROBIALS:  ertapenem  IVPB 1000 every 24 hours  nitrofurantoin monohydrate/macrocrystals (MACROBID) 100 two times a day with meals  rifAXIMin 550 two times a day      OTHER MEDS:  MEDICATIONS  (STANDING):  acetaminophen   Tablet .. 500 every 6 hours PRN  dextrose 40% Gel 15 once PRN  dextrose 50% Injectable 12.5 once  dextrose 50% Injectable 25 once  dextrose 50% Injectable 25 once  furosemide   Injectable 40 two times a day  glucagon  Injectable 1 once PRN  insulin lispro (HumaLOG) corrective regimen sliding scale  three times a day before meals  insulin lispro (HumaLOG) corrective regimen sliding scale  at bedtime  lactulose Syrup 20 every 8 hours  midodrine. 20 three times a day  pantoprazole    Tablet 40 before breakfast  tamsulosin 0.4 at bedtime      Vital Signs Last 24 Hrs  T(C): 36.6 (29 Apr 2020 13:00), Max: 36.8 (28 Apr 2020 21:16)  T(F): 97.8 (29 Apr 2020 13:00), Max: 98.2 (28 Apr 2020 21:16)  HR: 80 (29 Apr 2020 13:00) (78 - 85)  BP: 125/54 (29 Apr 2020 13:00) (110/57 - 125/54)  BP(mean): --  RR: 20 (29 Apr 2020 13:00) (18 - 20)  SpO2: 97% (29 Apr 2020 13:00) (94% - 98%)    PHYSICAL EXAMINATION:  General: Alert and Awake, NAD, Jaundiced  HEENT: PERRL, EOMI, Scleral Icterus, Oropharynx Clear, MMM  Neck: Supple, No CHELSEA  Cardiac: RRR, No M/R/G  Resp: CTAB, No Wh/Rh/Ra  Abdomen: NBS, NT/ND, No HSM, No rigidity or guarding  MSK: R Heel superficial ulcer (no surrounding erythema or drainage), No LE edema. No stigmata of IE. No evidence of phlebitis. No evidence of synovitis.  : No bob  Skin: No rashes or lesions. Skin is warm and dry to the touch.   Neuro: Alert and Awake. CN 2-12 Grossly intact. Moves all four extremities spontaneously.  Psych: Calm, Pleasant, Cooperative                          7.8    4.46  )-----------( 62       ( 29 Apr 2020 07:11 )             23.7       04-29    135  |  101  |  15  ----------------------------<  103<H>  3.6   |  26  |  1.06    Ca    8.7      29 Apr 2020 07:11    TPro  5.2<L>  /  Alb  x   /  TBili  x   /  DBili  x   /  AST  x   /  ALT  x   /  AlkPhos  x   04-29          MICROBIOLOGY:  v  .Urine Clean Catch (Midstream)  04-25-20   >100,000 CFU/ml Enterococcus faecium (vancomycin resistant)  --  Enterococcus faecium (vancomycin resistant)      .Blood Blood-Venous  04-25-20   No growth to date.  --  --      .Blood Blood-Peripheral  04-25-20   No growth to date.  --  --        CMV IgG Antibody: <0.20 U/mL (12-04-19 @ 08:33)  Toxoplasma IgG Screen: <3.0 IU/mL (12-04-19 @ 08:33)          RADIOLOGY:    <The imaging below has been reviewed and visualized by me independently. Findings as detailed in report below>    EXAM:  CT ABDOMEN AND PELVIS IC                        PROCEDURE DATE:  04/25/2020    Resolution of prostate abscess seen on CT abdomen pelvis 3/12/2020.  Cirrhosis.  Small perihepatic, perisplenic, and pelvic ascites.  Nonobstructive 0.6 cm left intrarenal calculus.    EXAM:  XR CHEST PORTABLE URGENT 1V                        PROCEDURE DATE:  04/24/2020    The lungs are clear. There is no pleural effusion or pneumothorax. The cardiac silhouette is unremarkable. No acute osseous abnormality.    EXAM:  MR FOOT RT                        PROCEDURE DATE:  04/26/2020    Limited exam. No evidence of osteomyelitis.  Diffuse subcutaneous soft tissue infiltration which may be secondary to edema or cellulitis.  Advanced second tar

## 2020-04-29 NOTE — PROGRESS NOTE ADULT - PROBLEM SELECTOR PLAN 1
improved mentation with Lactulose.  In fact, will lower dose as he is going too often! improved mentation with Lactulose.  Moving bowels 3-7x/day, observe and may need to lower dose

## 2020-04-29 NOTE — PROGRESS NOTE ADULT - SUBJECTIVE AND OBJECTIVE BOX
Chief Complaint: Confusion    Interval Events:   - The patient has no new complaints this morning    Allergies:  codeine (Anaphylaxis)    MEDICATIONS  (STANDING):  dextrose 5%. 1000 milliLiter(s) (50 mL/Hr) IV Continuous <Continuous>  dextrose 50% Injectable 12.5 Gram(s) IV Push once  dextrose 50% Injectable 25 Gram(s) IV Push once  dextrose 50% Injectable 25 Gram(s) IV Push once  ertapenem  IVPB 1000 milliGRAM(s) IV Intermittent every 24 hours  furosemide   Injectable 40 milliGRAM(s) IV Push two times a day  insulin lispro (HumaLOG) corrective regimen sliding scale   SubCutaneous three times a day before meals  insulin lispro (HumaLOG) corrective regimen sliding scale   SubCutaneous at bedtime  lactulose Syrup 20 Gram(s) Oral every 8 hours  midodrine. 20 milliGRAM(s) Oral three times a day  nitrofurantoin monohydrate/macrocrystals (MACROBID) 100 milliGRAM(s) Oral two times a day with meals  pantoprazole    Tablet 40 milliGRAM(s) Oral before breakfast  rifAXIMin 550 milliGRAM(s) Oral two times a day  tamsulosin 0.4 milliGRAM(s) Oral at bedtime    MEDICATIONS  (PRN):  acetaminophen   Tablet .. 500 milliGRAM(s) Oral every 6 hours PRN Temp greater or equal to 38C (100.4F), Mild Pain (1 - 3)  dextrose 40% Gel 15 Gram(s) Oral once PRN Blood Glucose LESS THAN 70 milliGRAM(s)/deciliter  glucagon  Injectable 1 milliGRAM(s) IntraMuscular once PRN Glucose LESS THAN 70 milligrams/deciliter    PMHX/PSHX:  GIB (gastrointestinal bleeding)  GERD with esophagitis  Hepatic encephalopathy  Obesity  Fatty liver disease, nonalcoholic  Renal stones  Hypertension  Neuropathy  Hypercholesteremia  Diabetes  S/P cholecystectomy    Family history:  Family history of type 2 diabetes mellitus  Family history of hypertension  Family history of stomach cancer  No pertinent family history in first degree relatives    ROS:     General:  No wt loss, fevers, chills, night sweats, fatigue,   Eyes:  Good vision, no reported pain  ENT:  No sore throat, pain, runny nose, dysphagia  CV:  No pain, palpitations, hypo/hypertension  Pulm:  No dyspnea, cough, tachypnea, wheezing  GI:  No pain, No nausea, No vomiting, No diarrhea, No constipation, No weight loss, No fever, No pruritis, No rectal bleeding, No tarry stools, No dysphagia  :  No pain, bleeding, incontinence, nocturia  Muscle:  No pain, weakness  Neuro:  No weakness, tingling, memory problems  Psych:  No fatigue, insomnia, mood problems, depression  Endocrine:  No polyuria, polydipsia, cold/heat intolerance  Heme:  No petechiae, ecchymosis, easy bruisability  Skin:  No rash, tattoos, scars, edema    PHYSICAL EXAM:   Vital Signs:  Vital Signs Last 24 Hrs  T(C): 36.7 (29 Apr 2020 02:50), Max: 36.8 (28 Apr 2020 21:16)  T(F): 98 (29 Apr 2020 02:50), Max: 98.2 (28 Apr 2020 21:16)  HR: 78 (29 Apr 2020 06:25) (78 - 85)  BP: 113/57 (29 Apr 2020 06:25) (110/69 - 131/69)  BP(mean): --  RR: 18 (29 Apr 2020 06:25) (18 - 20)  SpO2: 95% (29 Apr 2020 06:25) (94% - 96%)    GENERAL:  No acute distress, obese  HEENT:  Normocephalic/atraumatic,  no scleral icterus  CHEST: Normal effort, no accessory muscle use  ABDOMEN:  Soft, non-tender, non-distended, normoactive bowel sounds  EXTREMITIES:  No cyanosis, clubbing, or edema  SKIN:  No rash/erythema  NEURO:  Alert and oriented x 3, no asterixis, no tremor    LABS:                        7.8    4.46  )-----------( 62       ( 29 Apr 2020 07:11 )             23.7     04-29    135  |  101  |  15  ----------------------------<  103<H>  3.6   |  26  |  1.06    Ca    8.7      29 Apr 2020 07:11    TPro  5.6<L>  /  Alb  2.8<L>  /  TBili  7.6<H>  /  DBili  x   /  AST  24  /  ALT  14  /  AlkPhos  116  04-29    LIVER FUNCTIONS - ( 29 Apr 2020 07:11 )  Alb: 2.8 g/dL / Pro: 5.6 g/dL / ALK PHOS: 116 U/L / ALT: 14 U/L / AST: 24 U/L / GGT: x           PT/INR - ( 28 Apr 2020 08:17 )   PT: 30.7 sec;   INR: 2.64 ratio      Imaging:    Reviewed

## 2020-04-29 NOTE — PROGRESS NOTE ADULT - PROBLEM SELECTOR PLAN 4
Chronic, secondary to his end-stage liver disease.  Midodrine - monitor BP Chronic, secondary to his end-stage liver disease.  Midodrine - monitor BP -stable

## 2020-04-29 NOTE — PROGRESS NOTE ADULT - SUBJECTIVE AND OBJECTIVE BOX
INTERVAL HPI/OVERNIGHT EVENTS:    patient seen and examined earlier this morning  hgb stable; no overt s/s of GIB   no new GI complaints     MEDICATIONS  (STANDING):  albumin human 25% IVPB 50 milliLiter(s) IV Intermittent every 8 hours  dextrose 5%. 1000 milliLiter(s) (50 mL/Hr) IV Continuous <Continuous>  dextrose 50% Injectable 12.5 Gram(s) IV Push once  dextrose 50% Injectable 25 Gram(s) IV Push once  dextrose 50% Injectable 25 Gram(s) IV Push once  ertapenem  IVPB 1000 milliGRAM(s) IV Intermittent every 24 hours  folic acid 1 milliGRAM(s) Oral daily  furosemide   Injectable 40 milliGRAM(s) IV Push two times a day  insulin lispro (HumaLOG) corrective regimen sliding scale   SubCutaneous three times a day before meals  insulin lispro (HumaLOG) corrective regimen sliding scale   SubCutaneous at bedtime  lactulose Syrup 20 Gram(s) Oral every 8 hours  midodrine. 20 milliGRAM(s) Oral three times a day  nitrofurantoin monohydrate/macrocrystals (MACROBID) 100 milliGRAM(s) Oral two times a day with meals  pantoprazole    Tablet 40 milliGRAM(s) Oral before breakfast  rifAXIMin 550 milliGRAM(s) Oral two times a day  tamsulosin 0.4 milliGRAM(s) Oral at bedtime    MEDICATIONS  (PRN):  acetaminophen   Tablet .. 500 milliGRAM(s) Oral every 6 hours PRN Temp greater or equal to 38C (100.4F), Mild Pain (1 - 3)  dextrose 40% Gel 15 Gram(s) Oral once PRN Blood Glucose LESS THAN 70 milliGRAM(s)/deciliter  glucagon  Injectable 1 milliGRAM(s) IntraMuscular once PRN Glucose LESS THAN 70 milligrams/deciliter      Allergies    codeine (Anaphylaxis)    Intolerances        Review of Systems:    General:  No wt loss, fevers, chills, night sweats,fatigue,   Eyes:  Good vision, no reported pain  ENT:  No sore throat, pain, runny nose, dysphagia  CV:  No pain, palpitatioins, hypo/hypertension  Resp:  No dyspnea, cough, tachypnea, wheezing  GI:  No pain, No nausea, No vomiting, No diarrhea, No constipatiion, No weight loss, No fever, No pruritis, No rectal bleeding, No tarry stools, No dysphagia,  :  No pain, bleeding, incontinence, nocturia  Muscle:  No pain, weakness  Neuro:  No weakness, tingling, memory problems  Psych:  No fatigue, insomnia, mood problems, depression  Endocrine:  No polyuria, polydypsia, cold/heat intolerance  Heme:  No petechiae, ecchymosis, easy bruisability  Skin:  No rash, tattoos, scars, edema      Vital Signs Last 24 Hrs  T(C): 36.7 (28 Apr 2020 12:35), Max: 36.9 (28 Apr 2020 01:04)  T(F): 98 (28 Apr 2020 12:35), Max: 98.4 (28 Apr 2020 01:04)  HR: 80 (28 Apr 2020 12:35) (80 - 90)  BP: 120/62 (28 Apr 2020 12:35) (107/53 - 133/66)  BP(mean): --  RR: 20 (28 Apr 2020 12:35) (18 - 20)  SpO2: 95% (28 Apr 2020 12:35) (95% - 96%)    PHYSICAL EXAM:    Constitutional: NAD  HEENT: ncat   Neck: No LAD, supple  Respiratory: respirations unlabored  Cardiovascular: S1 and S2, RRR, no M  Gastrointestinal: obese, BS+, soft, NT/ND  Extremities: +peripheral edema  Neurological: A/O x 3, no focal deficits      LABS:                        7.5    4.93  )-----------( 61       ( 28 Apr 2020 06:38 )             22.4     04-28    136  |  106  |  12  ----------------------------<  130<H>  4.3   |  22  |  0.99    Ca    8.6      28 Apr 2020 06:38    TPro  5.1<L>  /  Alb  2.3<L>  /  TBili  7.1<H>  /  DBili  x   /  AST  25  /  ALT  15  /  AlkPhos  112  04-28    PT/INR - ( 28 Apr 2020 08:17 )   PT: 30.7 sec;   INR: 2.64 ratio               RADIOLOGY & ADDITIONAL TESTS:

## 2020-04-29 NOTE — PROGRESS NOTE ADULT - SUBJECTIVE AND OBJECTIVE BOX
Chart reviewed, pt doing better, discussed w/ nursing staff      MEDICATIONS  (STANDING):  dextrose 5%. 1000 milliLiter(s) (50 mL/Hr) IV Continuous <Continuous>  dextrose 50% Injectable 12.5 Gram(s) IV Push once  dextrose 50% Injectable 25 Gram(s) IV Push once  dextrose 50% Injectable 25 Gram(s) IV Push once  ertapenem  IVPB 1000 milliGRAM(s) IV Intermittent every 24 hours  furosemide   Injectable 40 milliGRAM(s) IV Push two times a day  insulin lispro (HumaLOG) corrective regimen sliding scale   SubCutaneous three times a day before meals  insulin lispro (HumaLOG) corrective regimen sliding scale   SubCutaneous at bedtime  lactulose Syrup 20 Gram(s) Oral every 8 hours  midodrine. 20 milliGRAM(s) Oral three times a day  nitrofurantoin monohydrate/macrocrystals (MACROBID) 100 milliGRAM(s) Oral two times a day with meals  pantoprazole    Tablet 40 milliGRAM(s) Oral before breakfast  rifAXIMin 550 milliGRAM(s) Oral two times a day  tamsulosin 0.4 milliGRAM(s) Oral at bedtime    MEDICATIONS  (PRN):  acetaminophen   Tablet .. 500 milliGRAM(s) Oral every 6 hours PRN Temp greater or equal to 38C (100.4F), Mild Pain (1 - 3)  dextrose 40% Gel 15 Gram(s) Oral once PRN Blood Glucose LESS THAN 70 milliGRAM(s)/deciliter  glucagon  Injectable 1 milliGRAM(s) IntraMuscular once PRN Glucose LESS THAN 70 milligrams/deciliter      ROS  No fever, sweats, chills  No epistaxis, HA, sore throat  No CP, SOB, cough, sputum  No n/v/d, abd pain, melena, hematochezia  No edema  No rash  No anxiety  No back pain, joint pain  No bleeding, bruising  No dysuria, hematuria    Vital Signs Last 24 Hrs  T(C): 36.6 (29 Apr 2020 13:00), Max: 36.8 (28 Apr 2020 21:16)  T(F): 97.8 (29 Apr 2020 13:00), Max: 98.2 (28 Apr 2020 21:16)  HR: 80 (29 Apr 2020 13:00) (78 - 85)  BP: 125/54 (29 Apr 2020 13:00) (110/57 - 125/54)  BP(mean): --  RR: 20 (29 Apr 2020 13:00) (18 - 20)  SpO2: 97% (29 Apr 2020 13:00) (94% - 98%)    PE    Patient examination deferred due to concerns for COVID 19 cross transmission and potential local limitations for sufficient PPE. Risks of PE therefore deemed to outweigh the benefit for the purposes of this encounter    FROM                          7.8    4.46  )-----------( 62       ( 29 Apr 2020 07:11 )             23.7       04-29    135  |  101  |  15  ----------------------------<  103<H>  3.6   |  26  |  1.06    Ca    8.7      29 Apr 2020 07:11    TPro  5.2<L>  /  Alb  x   /  TBili  x   /  DBili  x   /  AST  x   /  ALT  x   /  AlkPhos  x   04-29

## 2020-04-29 NOTE — PROGRESS NOTE ADULT - ATTENDING COMMENTS
Hepatology Staff: Yomi Medina MD    I saw and examined the patient along with  Dr. Abdi  04-29-20 @ 10:06.    Patient Medical Record, hospital course was reviewed and summarized as below:    Vitals: Vital Signs Last 24 Hrs  T(C): 36.7 (29 Apr 2020 02:50), Max: 36.8 (28 Apr 2020 21:16)  T(F): 98 (29 Apr 2020 02:50), Max: 98.2 (28 Apr 2020 21:16)  HR: 78 (29 Apr 2020 06:25) (78 - 85)  BP: 113/57 (29 Apr 2020 06:25) (110/69 - 131/69)    RR: 18 (29 Apr 2020 06:25) (18 - 20)  SpO2: 95% (29 Apr 2020 06:25) (94% - 96%)    Labs:INR: 2.62 ratio (04-29-20 @ 08:03)  Creatinine, Serum: 1.06 mg/dL (04-29-20 @ 07:11)  Bilirubin Total, Serum: 7.6 mg/dL (04-29-20 @ 07:11)      I/O: I&O's Summary    28 Apr 2020 07:01  -  29 Apr 2020 07:00  --------------------------------------------------------  IN: 120 mL / OUT: 4050 mL / NET: -3930 mL    29 Apr 2020 07:01  -  29 Apr 2020 10:06  --------------------------------------------------------  IN: 0 mL / OUT: 1200 mL / NET: -1200 mL      Nutritional Status:   Albumin, Serum: 2.8 g/dL (04-29-20 @ 07:11)    Last 24 hour events: Stable, afebrile. WBC within normal range.     Recommendations: Podiatry consult. Antibiotics per ID (agree with IV Ertapenem + Macrobid for UTI).  Wound care per primary team. Keep pt listed for LT on the UNOS list. MELD score 25.      Plan discussed with Primary team.

## 2020-04-29 NOTE — PROGRESS NOTE ADULT - PROBLEM SELECTOR PLAN 10
f/up Podiatry recs  No osteo on MRI  Heel pads f/up Podiatry recs - called today  No osteo on MRI  Heel pads

## 2020-04-29 NOTE — PROGRESS NOTE ADULT - PROBLEM SELECTOR PLAN 8
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  Macrocytosis and increased indirect component of hyperbilirubinemia consistent with hemolysis secondary to spur cell anemia.  B12, FA, and TSH all within normal limits.  No overt GI bleeding since admission and hemodynamically stable.  Transfusing 1 unit PRBCs 4/26 (to keep Hb >7).  daily CBC  Would benefit from eventual elective (non-emergent) repeat EGD to attempt repeat endoscopic therapy of GAVE and any AVMs to decrease chronic GI blood loss, but will defer for now given COVID-19 pandemic (which is limiting non-emergent procedures) as well as active infection and HE which increase his danilo-procedural risks. However, if overt bleeding develops or if not responding appropriate to PRBC transfusion, then would proceed with inpatient EGD.  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  Macrocytosis and increased indirect component of hyperbilirubinemia consistent with hemolysis secondary to spur cell anemia.  B12, FA, and TSH all within normal limits.  No overt GI bleeding since admission and hemodynamically stable.  Transfusing 1 unit PRBCs 4/26 (to keep Hb >7).  daily CBC  Would benefit from eventual elective (non-emergent) repeat EGD to attempt repeat endoscopic therapy of GAVE and any AVMs to decrease chronic GI blood loss, but will defer for now given COVID-19 pandemic (which is limiting non-emergent procedures) as well as active infection and HE which increase his danilo-procedural risks. However, if overt bleeding develops or if not responding appropriate to PRBC transfusion, then would proceed with inpatient EGD.

## 2020-04-30 ENCOUNTER — TRANSCRIPTION ENCOUNTER (OUTPATIENT)
Age: 65
End: 2020-04-30

## 2020-04-30 VITALS
RESPIRATION RATE: 18 BRPM | HEART RATE: 79 BPM | DIASTOLIC BLOOD PRESSURE: 60 MMHG | TEMPERATURE: 98 F | OXYGEN SATURATION: 96 % | SYSTOLIC BLOOD PRESSURE: 121 MMHG

## 2020-04-30 LAB
ALBUMIN SERPL ELPH-MCNC: 2.5 G/DL — LOW (ref 3.3–5)
ALP SERPL-CCNC: 120 U/L — SIGNIFICANT CHANGE UP (ref 40–120)
ALT FLD-CCNC: 14 U/L — SIGNIFICANT CHANGE UP (ref 10–45)
ANION GAP SERPL CALC-SCNC: 7 MMOL/L — SIGNIFICANT CHANGE UP (ref 5–17)
AST SERPL-CCNC: 25 U/L — SIGNIFICANT CHANGE UP (ref 10–40)
BASOPHILS # BLD AUTO: 0 K/UL — SIGNIFICANT CHANGE UP (ref 0–0.2)
BASOPHILS NFR BLD AUTO: 1.4 % — SIGNIFICANT CHANGE UP (ref 0–2)
BILIRUB SERPL-MCNC: 6.8 MG/DL — HIGH (ref 0.2–1.2)
BUN SERPL-MCNC: 16 MG/DL — SIGNIFICANT CHANGE UP (ref 7–23)
CALCIUM SERPL-MCNC: 8.6 MG/DL — SIGNIFICANT CHANGE UP (ref 8.4–10.5)
CHLORIDE SERPL-SCNC: 100 MMOL/L — SIGNIFICANT CHANGE UP (ref 96–108)
CO2 SERPL-SCNC: 29 MMOL/L — SIGNIFICANT CHANGE UP (ref 22–31)
CREAT SERPL-MCNC: 1.04 MG/DL — SIGNIFICANT CHANGE UP (ref 0.5–1.3)
CULTURE RESULTS: SIGNIFICANT CHANGE UP
CULTURE RESULTS: SIGNIFICANT CHANGE UP
EOSINOPHIL # BLD AUTO: 0.12 K/UL — SIGNIFICANT CHANGE UP (ref 0–0.5)
EOSINOPHIL NFR BLD AUTO: 3 % — SIGNIFICANT CHANGE UP (ref 0–6)
GLUCOSE BLDC GLUCOMTR-MCNC: 124 MG/DL — HIGH (ref 70–99)
GLUCOSE BLDC GLUCOMTR-MCNC: 196 MG/DL — HIGH (ref 70–99)
GLUCOSE BLDC GLUCOMTR-MCNC: 233 MG/DL — HIGH (ref 70–99)
GLUCOSE SERPL-MCNC: 148 MG/DL — HIGH (ref 70–99)
HCT VFR BLD CALC: 24.1 % — LOW (ref 39–50)
HGB BLD-MCNC: 7.9 G/DL — LOW (ref 13–17)
IMM GRANULOCYTES NFR BLD AUTO: 0.5 % — SIGNIFICANT CHANGE UP (ref 0–1.5)
INR BLD: 2.55 RATIO — HIGH (ref 0.88–1.16)
LYMPHOCYTES # BLD AUTO: 1.06 K/UL — SIGNIFICANT CHANGE UP (ref 1–3.3)
LYMPHOCYTES # BLD AUTO: 25 % — SIGNIFICANT CHANGE UP (ref 13–44)
MCHC RBC-ENTMCNC: 32.8 GM/DL — SIGNIFICANT CHANGE UP (ref 32–36)
MCHC RBC-ENTMCNC: 34.5 PG — HIGH (ref 27–34)
MCV RBC AUTO: 105.2 FL — HIGH (ref 80–100)
MONOCYTES # BLD AUTO: 0.5 K/UL — SIGNIFICANT CHANGE UP (ref 0–0.9)
MONOCYTES NFR BLD AUTO: 8 % — SIGNIFICANT CHANGE UP (ref 2–14)
NEUTROPHILS # BLD AUTO: 2.72 K/UL — SIGNIFICANT CHANGE UP (ref 1.8–7.4)
NEUTROPHILS NFR BLD AUTO: 64 % — SIGNIFICANT CHANGE UP (ref 43–77)
NRBC # BLD: 0 /100 WBCS — SIGNIFICANT CHANGE UP (ref 0–0)
PLAT MORPH BLD: NORMAL — SIGNIFICANT CHANGE UP
PLATELET # BLD AUTO: 66 K/UL — LOW (ref 150–400)
POTASSIUM SERPL-MCNC: 4 MMOL/L — SIGNIFICANT CHANGE UP (ref 3.5–5.3)
POTASSIUM SERPL-SCNC: 4 MMOL/L — SIGNIFICANT CHANGE UP (ref 3.5–5.3)
PROT SERPL-MCNC: 5.4 G/DL — LOW (ref 6–8.3)
PROTHROM AB SERPL-ACNC: 29.9 SEC — HIGH (ref 10–13.1)
RBC # BLD: 2.29 M/UL — LOW (ref 4.2–5.8)
RBC # FLD: 19 % — HIGH (ref 10.3–14.5)
RBC BLD AUTO: SIGNIFICANT CHANGE UP
SODIUM SERPL-SCNC: 136 MMOL/L — SIGNIFICANT CHANGE UP (ref 135–145)
SPECIMEN SOURCE: SIGNIFICANT CHANGE UP
SPECIMEN SOURCE: SIGNIFICANT CHANGE UP
WBC # BLD: 4.28 K/UL — SIGNIFICANT CHANGE UP (ref 3.8–10.5)
WBC # FLD AUTO: 4.28 K/UL — SIGNIFICANT CHANGE UP (ref 3.8–10.5)

## 2020-04-30 PROCEDURE — 85014 HEMATOCRIT: CPT

## 2020-04-30 PROCEDURE — 96365 THER/PROPH/DIAG IV INF INIT: CPT

## 2020-04-30 PROCEDURE — 85045 AUTOMATED RETICULOCYTE COUNT: CPT

## 2020-04-30 PROCEDURE — 97162 PT EVAL MOD COMPLEX 30 MIN: CPT

## 2020-04-30 PROCEDURE — 82248 BILIRUBIN DIRECT: CPT

## 2020-04-30 PROCEDURE — 81001 URINALYSIS AUTO W/SCOPE: CPT

## 2020-04-30 PROCEDURE — 99239 HOSP IP/OBS DSCHRG MGMT >30: CPT

## 2020-04-30 PROCEDURE — 84295 ASSAY OF SERUM SODIUM: CPT

## 2020-04-30 PROCEDURE — 71045 X-RAY EXAM CHEST 1 VIEW: CPT

## 2020-04-30 PROCEDURE — 85027 COMPLETE CBC AUTOMATED: CPT

## 2020-04-30 PROCEDURE — 84165 PROTEIN E-PHORESIS SERUM: CPT

## 2020-04-30 PROCEDURE — 87040 BLOOD CULTURE FOR BACTERIA: CPT

## 2020-04-30 PROCEDURE — 82435 ASSAY OF BLOOD CHLORIDE: CPT

## 2020-04-30 PROCEDURE — 85610 PROTHROMBIN TIME: CPT

## 2020-04-30 PROCEDURE — 73718 MRI LOWER EXTREMITY W/O DYE: CPT

## 2020-04-30 PROCEDURE — 97530 THERAPEUTIC ACTIVITIES: CPT

## 2020-04-30 PROCEDURE — 83540 ASSAY OF IRON: CPT

## 2020-04-30 PROCEDURE — 86923 COMPATIBILITY TEST ELECTRIC: CPT

## 2020-04-30 PROCEDURE — 80053 COMPREHEN METABOLIC PANEL: CPT

## 2020-04-30 PROCEDURE — 83010 ASSAY OF HAPTOGLOBIN QUANT: CPT

## 2020-04-30 PROCEDURE — 83550 IRON BINDING TEST: CPT

## 2020-04-30 PROCEDURE — 84132 ASSAY OF SERUM POTASSIUM: CPT

## 2020-04-30 PROCEDURE — 73630 X-RAY EXAM OF FOOT: CPT

## 2020-04-30 PROCEDURE — 84443 ASSAY THYROID STIM HORMONE: CPT

## 2020-04-30 PROCEDURE — 83615 LACTATE (LD) (LDH) ENZYME: CPT

## 2020-04-30 PROCEDURE — 82140 ASSAY OF AMMONIA: CPT

## 2020-04-30 PROCEDURE — 85730 THROMBOPLASTIN TIME PARTIAL: CPT

## 2020-04-30 PROCEDURE — 93005 ELECTROCARDIOGRAM TRACING: CPT

## 2020-04-30 PROCEDURE — 82330 ASSAY OF CALCIUM: CPT

## 2020-04-30 PROCEDURE — P9040: CPT

## 2020-04-30 PROCEDURE — 82947 ASSAY GLUCOSE BLOOD QUANT: CPT

## 2020-04-30 PROCEDURE — 97110 THERAPEUTIC EXERCISES: CPT

## 2020-04-30 PROCEDURE — 86334 IMMUNOFIX E-PHORESIS SERUM: CPT

## 2020-04-30 PROCEDURE — 83036 HEMOGLOBIN GLYCOSYLATED A1C: CPT

## 2020-04-30 PROCEDURE — 84155 ASSAY OF PROTEIN SERUM: CPT

## 2020-04-30 PROCEDURE — 80048 BASIC METABOLIC PNL TOTAL CA: CPT

## 2020-04-30 PROCEDURE — 82746 ASSAY OF FOLIC ACID SERUM: CPT

## 2020-04-30 PROCEDURE — 86901 BLOOD TYPING SEROLOGIC RH(D): CPT

## 2020-04-30 PROCEDURE — 74177 CT ABD & PELVIS W/CONTRAST: CPT

## 2020-04-30 PROCEDURE — 86900 BLOOD TYPING SEROLOGIC ABO: CPT

## 2020-04-30 PROCEDURE — P9047: CPT

## 2020-04-30 PROCEDURE — 82728 ASSAY OF FERRITIN: CPT

## 2020-04-30 PROCEDURE — 82962 GLUCOSE BLOOD TEST: CPT

## 2020-04-30 PROCEDURE — P9016: CPT

## 2020-04-30 PROCEDURE — 86850 RBC ANTIBODY SCREEN: CPT

## 2020-04-30 PROCEDURE — 82565 ASSAY OF CREATININE: CPT

## 2020-04-30 PROCEDURE — 83605 ASSAY OF LACTIC ACID: CPT

## 2020-04-30 PROCEDURE — 82803 BLOOD GASES ANY COMBINATION: CPT

## 2020-04-30 PROCEDURE — 99285 EMERGENCY DEPT VISIT HI MDM: CPT | Mod: 25

## 2020-04-30 PROCEDURE — 36430 TRANSFUSION BLD/BLD COMPNT: CPT

## 2020-04-30 PROCEDURE — 87635 SARS-COV-2 COVID-19 AMP PRB: CPT

## 2020-04-30 PROCEDURE — 87086 URINE CULTURE/COLONY COUNT: CPT

## 2020-04-30 PROCEDURE — 97116 GAIT TRAINING THERAPY: CPT

## 2020-04-30 PROCEDURE — 82607 VITAMIN B-12: CPT

## 2020-04-30 PROCEDURE — 87186 SC STD MICRODIL/AGAR DIL: CPT

## 2020-04-30 RX ORDER — NITROFURANTOIN MACROCRYSTAL 50 MG
1 CAPSULE ORAL
Qty: 0 | Refills: 0 | DISCHARGE
Start: 2020-04-30

## 2020-04-30 RX ORDER — LACTULOSE 10 G/15ML
30 SOLUTION ORAL
Qty: 0 | Refills: 0 | DISCHARGE
Start: 2020-04-30

## 2020-04-30 RX ORDER — INSULIN GLARGINE 100 [IU]/ML
12 INJECTION, SOLUTION SUBCUTANEOUS
Qty: 0 | Refills: 0 | DISCHARGE

## 2020-04-30 RX ORDER — MIDODRINE HYDROCHLORIDE 2.5 MG/1
3 TABLET ORAL
Qty: 0 | Refills: 0 | DISCHARGE

## 2020-04-30 RX ORDER — INSULIN LISPRO 100/ML
3 VIAL (ML) SUBCUTANEOUS
Qty: 0 | Refills: 0 | DISCHARGE

## 2020-04-30 RX ORDER — MIDODRINE HYDROCHLORIDE 2.5 MG/1
2 TABLET ORAL
Qty: 0 | Refills: 0 | DISCHARGE
Start: 2020-04-30

## 2020-04-30 RX ADMIN — Medication 1: at 17:00

## 2020-04-30 RX ADMIN — PANTOPRAZOLE SODIUM 40 MILLIGRAM(S): 20 TABLET, DELAYED RELEASE ORAL at 06:50

## 2020-04-30 RX ADMIN — Medication 100 MILLIGRAM(S): at 17:02

## 2020-04-30 RX ADMIN — ERTAPENEM SODIUM 120 MILLIGRAM(S): 1 INJECTION, POWDER, LYOPHILIZED, FOR SOLUTION INTRAMUSCULAR; INTRAVENOUS at 06:49

## 2020-04-30 RX ADMIN — LACTULOSE 20 GRAM(S): 10 SOLUTION ORAL at 11:43

## 2020-04-30 RX ADMIN — MIDODRINE HYDROCHLORIDE 20 MILLIGRAM(S): 2.5 TABLET ORAL at 11:43

## 2020-04-30 RX ADMIN — MIDODRINE HYDROCHLORIDE 20 MILLIGRAM(S): 2.5 TABLET ORAL at 06:51

## 2020-04-30 RX ADMIN — LACTULOSE 20 GRAM(S): 10 SOLUTION ORAL at 06:50

## 2020-04-30 RX ADMIN — Medication 40 MILLIGRAM(S): at 06:50

## 2020-04-30 RX ADMIN — Medication 100 MILLIGRAM(S): at 08:10

## 2020-04-30 RX ADMIN — MIDODRINE HYDROCHLORIDE 20 MILLIGRAM(S): 2.5 TABLET ORAL at 17:02

## 2020-04-30 RX ADMIN — Medication 2: at 11:43

## 2020-04-30 NOTE — PROGRESS NOTE ADULT - PROBLEM SELECTOR PLAN 8
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  Macrocytosis and increased indirect component of hyperbilirubinemia consistent with hemolysis secondary to spur cell anemia.  B12, FA, and TSH all within normal limits.  No overt GI bleeding since admission and hemodynamically stable.  Transfusing 1 unit PRBCs 4/26 (to keep Hb >7).  daily CBC  Would benefit from eventual elective (non-emergent) repeat EGD to attempt repeat endoscopic therapy of GAVE and any AVMs to decrease chronic GI blood loss, but will defer for now given COVID-19 pandemic (which is limiting non-emergent procedures) as well as active infection and HE which increase his danilo-procedural risks. However, if overt bleeding develops or if not responding appropriate to PRBC transfusion, then would proceed with inpatient EGD.

## 2020-04-30 NOTE — PROGRESS NOTE ADULT - SUBJECTIVE AND OBJECTIVE BOX
Chief Complaint: Confusion    Interval Events:   - The patient has no new complaints this morning  - Patient seen by podiatry yesterday and given boot to relieve pressure on foot    Allergies:  codeine (Anaphylaxis)    MEDICATIONS  (STANDING):  dextrose 5%. 1000 milliLiter(s) (50 mL/Hr) IV Continuous <Continuous>  dextrose 50% Injectable 12.5 Gram(s) IV Push once  dextrose 50% Injectable 25 Gram(s) IV Push once  dextrose 50% Injectable 25 Gram(s) IV Push once  ertapenem  IVPB 1000 milliGRAM(s) IV Intermittent every 24 hours  furosemide   Injectable 40 milliGRAM(s) IV Push two times a day  insulin lispro (HumaLOG) corrective regimen sliding scale   SubCutaneous three times a day before meals  insulin lispro (HumaLOG) corrective regimen sliding scale   SubCutaneous at bedtime  lactulose Syrup 20 Gram(s) Oral every 8 hours  midodrine. 20 milliGRAM(s) Oral three times a day  nitrofurantoin monohydrate/macrocrystals (MACROBID) 100 milliGRAM(s) Oral two times a day with meals  pantoprazole    Tablet 40 milliGRAM(s) Oral before breakfast  rifAXIMin 550 milliGRAM(s) Oral two times a day  tamsulosin 0.4 milliGRAM(s) Oral at bedtime    MEDICATIONS  (PRN):  acetaminophen   Tablet .. 500 milliGRAM(s) Oral every 6 hours PRN Temp greater or equal to 38C (100.4F), Mild Pain (1 - 3)  dextrose 40% Gel 15 Gram(s) Oral once PRN Blood Glucose LESS THAN 70 milliGRAM(s)/deciliter  glucagon  Injectable 1 milliGRAM(s) IntraMuscular once PRN Glucose LESS THAN 70 milligrams/deciliter    PMHX/PSHX:  GIB (gastrointestinal bleeding)  GERD with esophagitis  Hepatic encephalopathy  Obesity  Fatty liver disease, nonalcoholic  Renal stones  Hypertension  Neuropathy  Hypercholesteremia  Diabetes  S/P cholecystectomy    Family history:  Family history of type 2 diabetes mellitus  Family history of hypertension  Family history of stomach cancer  No pertinent family history in first degree relatives    ROS:     General:  No wt loss, fevers, chills, night sweats, fatigue,   Eyes:  Good vision, no reported pain  ENT:  No sore throat, pain, runny nose, dysphagia  CV:  No pain, palpitations, hypo/hypertension  Pulm:  No dyspnea, cough, tachypnea, wheezing  GI:  No pain, No nausea, No vomiting, No diarrhea, No constipation, No weight loss, No fever, No pruritis, No rectal bleeding, No tarry stools, No dysphagia  :  No pain, bleeding, incontinence, nocturia  Muscle:  No pain, weakness  Neuro:  No weakness, tingling, memory problems  Psych:  No fatigue, insomnia, mood problems, depression  Endocrine:  No polyuria, polydipsia, cold/heat intolerance  Heme:  No petechiae, ecchymosis, easy bruisability  Skin:  No rash, tattoos, scars, edema    PHYSICAL EXAM:   Vital Signs:  Vital Signs Last 24 Hrs  T(C): 36.7 (30 Apr 2020 05:43), Max: 36.8 (30 Apr 2020 01:17)  T(F): 98 (30 Apr 2020 05:43), Max: 98.2 (30 Apr 2020 01:17)  HR: 80 (30 Apr 2020 05:43) (76 - 84)  BP: 120/62 (30 Apr 2020 05:43) (110/57 - 130/67)  BP(mean): --  RR: 18 (30 Apr 2020 05:43) (18 - 20)  SpO2: 97% (30 Apr 2020 05:43) (96% - 98%)    GENERAL:  No acute distress, obese  HEENT:  Normocephalic/atraumatic,  no scleral icterus  CHEST: Normal effort, no accessory muscle use  ABDOMEN:  Soft, non-tender, non-distended, normoactive bowel sounds  EXTREMITIES:  No cyanosis, clubbing, or edema  SKIN:  No rash/erythema  NEURO:  Alert and oriented x 3, no asterixis, no tremor    LABS:                        7.9    4.28  )-----------( 66       ( 30 Apr 2020 06:47 )             24.1     04-30    136  |  100  |  16  ----------------------------<  148<H>  4.0   |  29  |  1.04    Ca    8.6      30 Apr 2020 06:46    TPro  5.4<L>  /  Alb  2.5<L>  /  TBili  6.8<H>  /  DBili  x   /  AST  25  /  ALT  14  /  AlkPhos  120  04-30    LIVER FUNCTIONS - ( 30 Apr 2020 06:46 )  Alb: 2.5 g/dL / Pro: 5.4 g/dL / ALK PHOS: 120 U/L / ALT: 14 U/L / AST: 25 U/L / GGT: x           PT/INR - ( 29 Apr 2020 08:03 )   PT: 31.1 sec;   INR: 2.62 ratio      Imaging:    Reviewed

## 2020-04-30 NOTE — PROGRESS NOTE ADULT - PROBLEM SELECTOR PLAN 7
Diuretics had been recently re-started as an outpatient, with recent dosing clarified today per wife by phone: furosemide 40 mg po daily taken 4/10-11 only, spironolactone 50 mg po daily taken 4/10-22 and increased to 100 mg po daily 4/23-24.  Holding diuretics for now, as above, given tachycardia as well as MISA.
He is UNOS wait-listed for liver transplantation at [facility], blood type A, with MELD-Na 26 today, though currently inactive on wait-list due to the COVID-19 pandemic ([facility] liver wait-list temporarily inactivated).
He is UNOS wait-listed for liver transplantation at [facility], blood type A, with MELD-Na 26 today, though currently inactive on wait-list due to the COVID-19 pandemic ([facility] liver wait-list temporarily inactivated).
He is UNOS wait-listed for liver transplantation at [facility], blood type A, with MELD-Na 26 today, though currently inactive on wait-list due to the COVID-19 pandemic ([facility] liver wait-list temporarily inactivated).
He is UNOS wait-listed for liver transplantation at [facility], blood type A, with MELD-Na 26 today, though currently inactive on wait-list due to the COVID-19 pandemic ([facility] liver wait-list temporarily inactivated).
Diuretics had been recently re-started as an outpatient, with recent dosing clarified per wife by phone: furosemide 40 mg po daily taken 4/10-11 only, spironolactone 50 mg po daily taken 4/10-22 and increased to 100 mg po daily 4/23-24.  Tentatively will plan to resume low-dose diuretics tomorrow, as above, if renal function stable/improving.

## 2020-04-30 NOTE — PROGRESS NOTE ADULT - PROBLEM SELECTOR PROBLEM 1
Hepatic encephalopathy

## 2020-04-30 NOTE — PROGRESS NOTE ADULT - PROBLEM SELECTOR PROBLEM 5
Orthostatic hypotension
Other ascites
Orthostatic hypotension

## 2020-04-30 NOTE — PROGRESS NOTE ADULT - SUBJECTIVE AND OBJECTIVE BOX
PROGRESS NOTE:   Authored by Dr. Noemi Doyle MD  Pager [phone]     Patient is a 64y old  Male who presents with a chief complaint of confusion (30 Apr 2020 11:01)      SUBJECTIVE / OVERNIGHT EVENTS: Patient seen and examined at bedside - patient says he feels well. No new complaints. Having approx 5 BM per day. Urinating well. R heel feels better with dressing.     ADDITIONAL REVIEW OF SYSTEMS: as above    MEDICATIONS  (STANDING):  dextrose 5%. 1000 milliLiter(s) (50 mL/Hr) IV Continuous <Continuous>  dextrose 50% Injectable 12.5 Gram(s) IV Push once  dextrose 50% Injectable 25 Gram(s) IV Push once  dextrose 50% Injectable 25 Gram(s) IV Push once  insulin lispro (HumaLOG) corrective regimen sliding scale   SubCutaneous three times a day before meals  insulin lispro (HumaLOG) corrective regimen sliding scale   SubCutaneous at bedtime  lactulose Syrup 20 Gram(s) Oral every 8 hours  midodrine. 20 milliGRAM(s) Oral three times a day  nitrofurantoin monohydrate/macrocrystals (MACROBID) 100 milliGRAM(s) Oral two times a day with meals  pantoprazole    Tablet 40 milliGRAM(s) Oral before breakfast  rifAXIMin 550 milliGRAM(s) Oral two times a day  Silver sulfadiazine cream  1% 1 Application(s) 1 Application(s) Topical every other day  tamsulosin 0.4 milliGRAM(s) Oral at bedtime    MEDICATIONS  (PRN):  acetaminophen   Tablet .. 500 milliGRAM(s) Oral every 6 hours PRN Temp greater or equal to 38C (100.4F), Mild Pain (1 - 3)  dextrose 40% Gel 15 Gram(s) Oral once PRN Blood Glucose LESS THAN 70 milliGRAM(s)/deciliter  glucagon  Injectable 1 milliGRAM(s) IntraMuscular once PRN Glucose LESS THAN 70 milligrams/deciliter      CAPILLARY BLOOD GLUCOSE      POCT Blood Glucose.: 124 mg/dL (30 Apr 2020 08:02)  POCT Blood Glucose.: 249 mg/dL (29 Apr 2020 21:26)  POCT Blood Glucose.: 164 mg/dL (29 Apr 2020 17:12)    I&O's Summary    29 Apr 2020 07:01  -  30 Apr 2020 07:00  --------------------------------------------------------  IN: 0 mL / OUT: 3200 mL / NET: -3200 mL    30 Apr 2020 07:01  -  30 Apr 2020 11:37  --------------------------------------------------------  IN: 0 mL / OUT: 1650 mL / NET: -1650 mL        PHYSICAL EXAM:  Vital Signs Last 24 Hrs  T(C): 36.7 (30 Apr 2020 05:43), Max: 36.8 (30 Apr 2020 01:17)  T(F): 98 (30 Apr 2020 05:43), Max: 98.2 (30 Apr 2020 01:17)  HR: 80 (30 Apr 2020 05:43) (76 - 82)  BP: 120/62 (30 Apr 2020 05:43) (116/64 - 130/67)  BP(mean): --  RR: 18 (30 Apr 2020 05:43) (18 - 20)  SpO2: 97% (30 Apr 2020 05:43) (96% - 97%)    PHYSICAL EXAM:  GENERAL: No apparent distress, appears comfortable but chronically ill  HEAD:  Atraumatic, Normocephalic  EYES: Conjunctiva and sclera clear, no discharge  ENMT: Moist mucous membranes, no nasal discharge  NECK: Supple, no JVD  CHEST/LUNG: Bilateral air entry  HEART: Regular rhythm  ABDOMEN: Obese, non-tender  EXTREMITIES:  No COLLETTE  SKIN: ulcer stage 3 right heel, no purulence  NERVOUS SYSTEM:  Alert & Oriented; Bilateral Lower extremity mobile, sensation to light touch intact    LABS:                        7.9    4.28  )-----------( 66       ( 30 Apr 2020 06:47 )             24.1     04-30    136  |  100  |  16  ----------------------------<  148<H>  4.0   |  29  |  1.04    Ca    8.6      30 Apr 2020 06:46    TPro  5.4<L>  /  Alb  2.5<L>  /  TBili  6.8<H>  /  DBili  x   /  AST  25  /  ALT  14  /  AlkPhos  120  04-30    PT/INR - ( 30 Apr 2020 08:15 )   PT: 29.9 sec;   INR: 2.55 ratio                     RADIOLOGY & ADDITIONAL TESTS:  Results Reviewed:   Imaging Personally Reviewed:  Electrocardiogram Personally Reviewed:    COORDINATION OF CARE:  Care Discussed with Consultants/Other Providers [Y/N]:  Prior or Outpatient Records Reviewed [Y/N]:

## 2020-04-30 NOTE — PROGRESS NOTE ADULT - PROBLEM SELECTOR PROBLEM 2
Prostate abscess

## 2020-04-30 NOTE — PROGRESS NOTE ADULT - ATTENDING COMMENTS
PT recommends [facility].  spoke to wife - she is ok with [facility] (Pref[facility]) as patient has limited help at home.  patient with multiple co-morbid conditions; high risk for future complications despite optimal medical therapy PT recommends S[facility]ANA.  d/w wife at length - prefers for patient to go to rehab. She will talk to her .   Per wife was going back and forth between hepatology and urology - recently only on spironolactone 100mg daily. Will d/w hepatology as most likely would benefit from being on both lasix and spironolactone.     patient with multiple co-morbid conditions; high risk for future complications despite optimal medical therapy PT recommends [facility].  d/w wife at length - prefers for patient to go to rehab. She will talk to her .   Per wife was going back and forth between hepatology and urology - recently only on spironolactone 100mg daily. Will d/w hepatology as most likely would benefit from being on both lasix and spironolactone.     patient with multiple co-morbid conditions; high risk for future complications despite optimal medical therapy    discharge time 40min

## 2020-04-30 NOTE — PROGRESS NOTE ADULT - PROBLEM SELECTOR PLAN 9
Has chronic anemia secondary to chronic occult GI blood loss.  Hb stable compared to his baseline, but with increasing macrocytosis.  Will re-check TSH, FA, and vitamin B12 levels with AM labs tomorrow.  No indication for PRBC transfusion unless Hb decreases to <7.
HbA1c 4.8%, suggesting may be able to decrease home insulin regimen.  adjust insulin dose daily as appropriate based on glucose levels.
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  Macrocytosis and increased indirect component of hyperbilirubinemia consistent with hemolysis secondary to spur cell anemia.  B12, FA, and TSH all within normal limits.  No overt GI bleeding since admission and hemodynamically stable.  Transfusing 1 unit PRBCs today (to keep Hb >7) and will re-check Hb/HCT post-transfusion to ensure appropriate response.  Would benefit from eventual elective (non-emergent) repeat EGD to attempt repeat endoscopic therapy of GAVE and any AVMs to decrease chronic GI blood loss, but will defer for now given COVID-19 pandemic (which is limiting non-emergent procedures) as well as active infection and HE which increase his danilo-procedural risks. However, if overt bleeding develops or if not responding appropriate to PRBC transfusion, then would proceed with inpatient EGD.

## 2020-04-30 NOTE — PROGRESS NOTE ADULT - PROBLEM SELECTOR PROBLEM 9
Anemia, unspecified type
Type 2 diabetes mellitus with diabetic polyneuropathy, with long-term current use of insulin
Anemia, unspecified type

## 2020-04-30 NOTE — PROGRESS NOTE ADULT - PROBLEM SELECTOR PROBLEM 10
Plantar ulcer of right foot, unspecified ulcer stage
Type 2 diabetes mellitus with diabetic polyneuropathy, with long-term current use of insulin
Type 2 diabetes mellitus with diabetic polyneuropathy, with long-term current use of insulin

## 2020-04-30 NOTE — PROGRESS NOTE ADULT - PROBLEM SELECTOR PLAN 5
Chronic, secondary to his end-stage liver disease. Currently hypertensive with SBP 130s-150s.  Decreasing midodrine back to home dose of 20 mg po tid.
Mild, seen on imaging, insufficient to pursue diagnostic paracentesis currently.  Home diuretic regimen clarified (see below).  Diuretics per liver team
Chronic, secondary to his end-stage liver disease.  Continuing midodrine 20 mg po tid for now (home dose), but will consider decreasing to 10 mg po tid if MAP remains >70 mmHg (currently in 80s).

## 2020-04-30 NOTE — PROGRESS NOTE ADULT - PROBLEM SELECTOR PROBLEM 8
Anemia, unspecified type
Other cirrhosis of liver
Other cirrhosis of liver

## 2020-04-30 NOTE — PROGRESS NOTE ADULT - SUBJECTIVE AND OBJECTIVE BOX
Podiatry pager #: [phone]/ [phone]    Patient is a 64y old  Male who presents with a chief complaint of Confusion (30 Apr 2020 07:23) podiatry seen today for f/u right heel wound       INTERVAL HPI/OVERNIGHT EVENTS:  Patient seen and evaluated at bedside.  Pt is resting comfortable in NAD. Denies N/V/F/C.  Pain rated at X/10    Allergies    codeine (Anaphylaxis)    Intolerances        Vital Signs Last 24 Hrs  T(C): 36.7 (30 Apr 2020 05:43), Max: 36.8 (30 Apr 2020 01:17)  T(F): 98 (30 Apr 2020 05:43), Max: 98.2 (30 Apr 2020 01:17)  HR: 80 (30 Apr 2020 05:43) (76 - 82)  BP: 120/62 (30 Apr 2020 05:43) (116/64 - 130/67)  BP(mean): --  RR: 18 (30 Apr 2020 05:43) (18 - 20)  SpO2: 97% (30 Apr 2020 05:43) (96% - 97%)    acetaminophen   Tablet .. 500 milliGRAM(s) Oral every 6 hours PRN  dextrose 40% Gel 15 Gram(s) Oral once PRN  dextrose 5%. 1000 milliLiter(s) IV Continuous <Continuous>  dextrose 50% Injectable 12.5 Gram(s) IV Push once  dextrose 50% Injectable 25 Gram(s) IV Push once  dextrose 50% Injectable 25 Gram(s) IV Push once  glucagon  Injectable 1 milliGRAM(s) IntraMuscular once PRN  insulin lispro (HumaLOG) corrective regimen sliding scale   SubCutaneous three times a day before meals  insulin lispro (HumaLOG) corrective regimen sliding scale   SubCutaneous at bedtime  lactulose Syrup 20 Gram(s) Oral every 8 hours  midodrine. 20 milliGRAM(s) Oral three times a day  nitrofurantoin monohydrate/macrocrystals (MACROBID) 100 milliGRAM(s) Oral two times a day with meals  pantoprazole    Tablet 40 milliGRAM(s) Oral before breakfast  rifAXIMin 550 milliGRAM(s) Oral two times a day  tamsulosin 0.4 milliGRAM(s) Oral at bedtime      LABS:                        7.9    4.28  )-----------( 66       ( 30 Apr 2020 06:47 )             24.1     04-30    136  |  100  |  16  ----------------------------<  148<H>  4.0   |  29  |  1.04    Ca    8.6      30 Apr 2020 06:46    TPro  5.4<L>  /  Alb  2.5<L>  /  TBili  6.8<H>  /  DBili  x   /  AST  25  /  ALT  14  /  AlkPhos  120  04-30    PT/INR - ( 30 Apr 2020 08:15 )   PT: 29.9 sec;   INR: 2.55 ratio             CAPILLARY BLOOD GLUCOSE      POCT Blood Glucose.: 124 mg/dL (30 Apr 2020 08:02)  POCT Blood Glucose.: 249 mg/dL (29 Apr 2020 21:26)  POCT Blood Glucose.: 164 mg/dL (29 Apr 2020 17:12)  POCT Blood Glucose.: 221 mg/dL (29 Apr 2020 11:31)      Lower Extremity Physical Exam:  Vasular: DP/PT 1_/4, B/L, CFT <_ 2seconds B/L, Temperature gradient _wnl, B/L.   Neuro: Epicritic sensation diminished to the level of toes, B/L.:  Skin:  Wound #1: right foot  Location: plantar heel  Size: 5.5cm diameter  Depth: 2mm   Wound bed: granular   Drainage: minimal serous drainage on dressing  Odor: none  Periwound: no clinical signs of infection  Etiology: DM    RADIOLOGY & ADDITIONAL TESTS:

## 2020-04-30 NOTE — PROGRESS NOTE ADULT - ATTENDING COMMENTS
Hepatology Staff: Yomi Medina MD    I saw and examined the patient along with  Dr. Abdi  04-30-20 @ 09:58.    Patient Medical Record, hospital course was reviewed and summarized as below:    Vitals: Vital Signs Last 24 Hrs  T(C): 36.7 (30 Apr 2020 05:43), Max: 36.8 (30 Apr 2020 01:17)  T(F): 98 (30 Apr 2020 05:43), Max: 98.2 (30 Apr 2020 01:17)  HR: 80 (30 Apr 2020 05:43) (76 - 84)  BP: 120/62 (30 Apr 2020 05:43) (116/64 - 130/67)    RR: 18 (30 Apr 2020 05:43) (18 - 20)  SpO2: 97% (30 Apr 2020 05:43) (96% - 98%)    Antibiotics:nitrofurantoin monohydrate/macrocrystals (MACROBID) 100 milliGRAM(s) Oral two times a day with meals    Diuretics:  Labs:INR: 2.55 ratio (04-30-20 @ 08:15)  Creatinine, Serum: 1.04 mg/dL (04-30-20 @ 06:46)  Bilirubin Total, Serum: 6.8 mg/dL (04-30-20 @ 06:46)      I/O: I&O's Summary    29 Apr 2020 07:01  -  30 Apr 2020 07:00  --------------------------------------------------------  IN: 0 mL / OUT: 3200 mL / NET: -3200 mL      Nutritional Status:   Albumin, Serum: 2.5 g/dL (04-30-20 @ 06:46)    Last 24 hour events:  Stable clinically. Noted plans from Podiatry and ID.    Recommendations: Overall improving. Resolved HE. Continue with supportive care. Keep listed on the UNOS list. Disposition planning per primary team.      Plan discussed with Primary team.

## 2020-04-30 NOTE — PROGRESS NOTE ADULT - SUBJECTIVE AND OBJECTIVE BOX
INTERVAL HPI/OVERNIGHT EVENTS:    patient seen and examined  no new GI complaints     MEDICATIONS  (STANDING):  albumin human 25% IVPB 50 milliLiter(s) IV Intermittent every 8 hours  dextrose 5%. 1000 milliLiter(s) (50 mL/Hr) IV Continuous <Continuous>  dextrose 50% Injectable 12.5 Gram(s) IV Push once  dextrose 50% Injectable 25 Gram(s) IV Push once  dextrose 50% Injectable 25 Gram(s) IV Push once  ertapenem  IVPB 1000 milliGRAM(s) IV Intermittent every 24 hours  folic acid 1 milliGRAM(s) Oral daily  furosemide   Injectable 40 milliGRAM(s) IV Push two times a day  insulin lispro (HumaLOG) corrective regimen sliding scale   SubCutaneous three times a day before meals  insulin lispro (HumaLOG) corrective regimen sliding scale   SubCutaneous at bedtime  lactulose Syrup 20 Gram(s) Oral every 8 hours  midodrine. 20 milliGRAM(s) Oral three times a day  nitrofurantoin monohydrate/macrocrystals (MACROBID) 100 milliGRAM(s) Oral two times a day with meals  pantoprazole    Tablet 40 milliGRAM(s) Oral before breakfast  rifAXIMin 550 milliGRAM(s) Oral two times a day  tamsulosin 0.4 milliGRAM(s) Oral at bedtime    MEDICATIONS  (PRN):  acetaminophen   Tablet .. 500 milliGRAM(s) Oral every 6 hours PRN Temp greater or equal to 38C (100.4F), Mild Pain (1 - 3)  dextrose 40% Gel 15 Gram(s) Oral once PRN Blood Glucose LESS THAN 70 milliGRAM(s)/deciliter  glucagon  Injectable 1 milliGRAM(s) IntraMuscular once PRN Glucose LESS THAN 70 milligrams/deciliter      Allergies    codeine (Anaphylaxis)    Intolerances        Review of Systems:    General:  No wt loss, fevers, chills, night sweats,fatigue,   Eyes:  Good vision, no reported pain  ENT:  No sore throat, pain, runny nose, dysphagia  CV:  No pain, palpitatioins, hypo/hypertension  Resp:  No dyspnea, cough, tachypnea, wheezing  GI:  No pain, No nausea, No vomiting, No diarrhea, No constipatiion, No weight loss, No fever, No pruritis, No rectal bleeding, No tarry stools, No dysphagia,  :  No pain, bleeding, incontinence, nocturia  Muscle:  No pain, weakness  Neuro:  No weakness, tingling, memory problems  Psych:  No fatigue, insomnia, mood problems, depression  Endocrine:  No polyuria, polydypsia, cold/heat intolerance  Heme:  No petechiae, ecchymosis, easy bruisability  Skin:  No rash, tattoos, scars, edema      Vital Signs Last 24 Hrs  T(C): 36.7 (28 Apr 2020 12:35), Max: 36.9 (28 Apr 2020 01:04)  T(F): 98 (28 Apr 2020 12:35), Max: 98.4 (28 Apr 2020 01:04)  HR: 80 (28 Apr 2020 12:35) (80 - 90)  BP: 120/62 (28 Apr 2020 12:35) (107/53 - 133/66)  BP(mean): --  RR: 20 (28 Apr 2020 12:35) (18 - 20)  SpO2: 95% (28 Apr 2020 12:35) (95% - 96%)    PHYSICAL EXAM:    Constitutional: NAD  HEENT: ncat   Neck: No LAD, supple  Respiratory: respirations unlabored  Cardiovascular: S1 and S2, RRR, no M  Gastrointestinal: obese, BS+, soft, NT/ND  Extremities: +peripheral edema  Neurological: A/O x 3, no focal deficits      LABS:                        7.5    4.93  )-----------( 61       ( 28 Apr 2020 06:38 )             22.4     04-28    136  |  106  |  12  ----------------------------<  130<H>  4.3   |  22  |  0.99    Ca    8.6      28 Apr 2020 06:38    TPro  5.1<L>  /  Alb  2.3<L>  /  TBili  7.1<H>  /  DBili  x   /  AST  25  /  ALT  15  /  AlkPhos  112  04-28    PT/INR - ( 28 Apr 2020 08:17 )   PT: 30.7 sec;   INR: 2.64 ratio               RADIOLOGY & ADDITIONAL TESTS:

## 2020-04-30 NOTE — PROGRESS NOTE ADULT - PROBLEM SELECTOR PLAN 3
Baseline Cr ~1, currently mildly elevated to Cr 1.24, with concern for UTI as above.  May also have component of pre-renal azotemia in context of recently increased home diuretics.  Holding diuretics for now, and will give albumin 25% 100 mL iv q8h x24h.  Voiding well without indwelling catheter, awaiting PVR result per Urology.
Baseline Cr ~1.  Initially with mild MISA on admission to peak Cr 1.24, now improving after receiving albumin 25% 100 mL iv q8h x3 doses (4/25-26).  Diuretics with albumin as per Liver team - edema resolved
Baseline Cr ~1.  Initially with mild MISA on admission to peak Cr 1.24, now improving after receiving albumin 25% 100 mL iv q8h x3 doses (4/25-26).  Diuretics with albumin as per Liver team - has significant leg edema
Baseline Cr ~1.  Initially with mild MISA on admission to peak Cr 1.24, now improving after receiving albumin 25% 100 mL iv q8h x3 doses (4/25-26).  Continuing to hold diuretics for now but will tentatively plan to resume low dose diuretics tomorrow if renal function continuing to improve.

## 2020-04-30 NOTE — PROGRESS NOTE ADULT - PROBLEM SELECTOR PLAN 2
Appreciate Urology input.  Repeat CT (4/25) with resolution of prostatic abscess.  Voiding well after prior indwelling catheter removed as outpatient on 4/14/20,     Urine culture (4/25) with >100k gram positive organisms, VRE. Enterococcus faecium - sensitive to ertapenem  Blood cultures (4/25 x2) with NGTD.  Ertapenem (4/25-5/29), Macrobid 4/27-5/4- to complete 5 day course of each.  Transplant ID consulted, appreciate recommendations.

## 2020-04-30 NOTE — PROGRESS NOTE ADULT - PROBLEM SELECTOR PROBLEM 7
Other cirrhosis of liver
Peripheral edema
Peripheral edema

## 2020-04-30 NOTE — PROGRESS NOTE ADULT - REASON FOR ADMISSION
confusion

## 2020-04-30 NOTE — DISCHARGE NOTE NURSING/CASE MANAGEMENT/SOCIAL WORK - PATIENT PORTAL LINK FT
You can access the FollowMyHealth Patient Portal offered by [facility] by registering at the following website: [email or web address]. By joining [email or web address]’s FollowMyHealth portal, you will also be able to view your health information using other applications (apps) compatible with our system.

## 2020-04-30 NOTE — PROGRESS NOTE ADULT - ASSESSMENT
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 He has had multiple recent hospitalizations, including hospitalization from 2/11/20-2/21/20 due to septic shock secondary to emphysematous cystitis and ESBL E. coli bacteremia; from 2/28/20-3/3/20 after a mechanical fall; and most recently, from 3/10/20-3/24/20 due to sepsis secondary to prostatitis and cystitis with ESBL E. coli with evidence of a large prostatic abscess and fistulous tract between the prostate and bladder, s/p TURP and prostatic drainage by Urology on 3/15/20 (prostate biopsies: benign prostate tissue with patchy moderate acute and chronic inflammation), s/p 4-week course of ertapenem (completed on 4/11/20), s/p cystogram and Beasley catheter removal on 4/14/20 followed by a 1-week course of Bactrim (completed on 4/21/20). He is currently re-admitted due to hepatic encephalopathy that was refractory to increased lactulose dosing at home, also with concern for UTI vs less likely R foot infection.
63 yo M with IDDM, dyslipidemia, obesity, GERD, HFpEF with mild LV diastolic dysfunction, and decompensated JEAN cirrhosis complicated by ascites, history of SBP, hepatic encephalopathy, and chronic anemia with a history of duodenal ulcer as well as GAVE and duodenal AVM s/p APC (last on 10/11/19), who is UNOS listed for liver transplantation at [facility]. He has had multiple recent hospitalizations, including hospitalization from 2/11/20-2/21/20 due to septic shock secondary to emphysematous cystitis and ESBL E. coli bacteremia; from 2/28/20-3/3/20 after a mechanical fall; and most recently, from 3/10/20-3/24/20 due to sepsis secondary to prostatitis and cystitis with ESBL E. coli with evidence of a large prostatic abscess and fistulous tract between the prostate and bladder, s/p TURP and prostatic drainage by Urology on 3/15/20 (prostate biopsies: benign prostate tissue with patchy moderate acute and chronic inflammation), s/p 4-week course of ertapenem (completed on 4/11/20), s/p cystogram and Beasley catheter removal on 4/14/20 followed by a 1-week course of Bactrim (completed on 4/21/20). He is currently re-admitted due to hepatic encephalopathy that was refractory to increased lactulose dosing at home, also with concern for UTI vs less likely R foot infection.
64 year old male JEAN cirrhosis on UNOS transplant list, DM2, Orthostatic hypotension, GERD, HTN, peripheral neuropathy, HFpEF with mild LV diastolic dysfunction who presented to [facility] on 4/24 with encephalopathy.    U/A with 149 WBC.   Urine Cultures with >100K VRE.   COVID19 PCR negative.   CXR clear.   CT A/P with resolution of prostate abscess, small ascites and Nonobstructive 0.6 cm left intrarenal calculus.     Received Ceftriaxone and Ertapenem prior to UCx   Thus, unclear if VRE was selected for (from antimicrobial pressure) and does not represent true pathogen  Patient's encephalopthy improved while on Ertapenem so favor completing 5 days  Would also complete 5 day course of Macrobid targeting VRE    Overall, Encephalopathy, Abnormal Urinalysis, R Heel Ulcer, Elevated Bilirubin, VRE Colonization, Pre-OLT Evaluation    --Continue Macrobid 100 mg PO BID x 7 days  --Continue Ertapenem 1g IV Q24H (End Date: 4/29)  --Continue wound care to RLE Foot  --No absolute ID contraindication for OLT  --Continue to follow CBC with diff  --Continue to follow transaminases  --Continue to follow temperature curve  --Follow up on preliminary blood cultures    I will continue to follow. Please feel free to contact me with any further questions.    Eusebio Pérez M.D.  [facility] Division of Infectious Disease  8AM-5PM: Pager Number [phone]  After Hours (or if no response): Please contact the Infectious Diseases Office at [phone]
64 year old male with multiple medical comorbidities including DM2, obesity, BPH,  HFpEF with mild LV diastolic dysfunction,  decompensated JEAN cirrhosis on UNOS transplant list c/b chronic liver failure, SBP, hepatic encephalopathy, anemia of chronic disease, ?MGUS, duodenal ulcer, GAVE/duodenal AVM s/p APC (last on 10/11/19), ESBL ecoli bacteremia and prostate abscess, recent hospitalization prostatic abscess unroofing and 4 weeks ertapenem (last dose 4/11/20), p/w few days of confusion.    -- UCx 100k gram positive - enterococcus? although pt w/hx of ESBL E. coli  --  no need for intervention, recommend medical therapy with flomax 0.4 mg qHS and possible Proscar.  -- Continue accurate I/O's  -- seen and examined w/Dr. Rios  -- pt to f/u w/Dr. Mayfield as outpt  -- please call if questions
64 year old male with multiple medical comorbidities including DM2, obesity, BPH,  HFpEF with mild LV diastolic dysfunction,  decompensated JEAN cirrhosis on UNOS transplant list c/b chronic liver failure, SBP, hepatic encephalopathy, anemia of chronic disease, ?MGUS, duodenal ulcer, GAVE/duodenal AVM s/p APC (last on 10/11/19), ESBL ecoli bacteremia and prostate abscess, recent hospitalization prostatic abscess unroofing and 4 weeks ertapenem (last dose 4/11/20), p/w few days of confusion.    -- UCx 100k gram positive - enterococcus? although pt w/hx of ESBL E. coli  -- Await PVR  -- pt subjectively voiding well. If PVR is elevated, would just recommend medical therapy with flomax 0.4 mg qHS and possible Proscar.  Would attempt to not instrument with a bob if possible given hx of TUR prostate abscess  -- Continue accurate I/O's  -- No acute urologic interventions  -- seen and examined w/Dr. Rios  -- pt to f/u w/Dr. Mayfield as outpt  -- please call if questions
65 yo M with IDDM, dyslipidemia, obesity, GERD, HFpEF with mild LV diastolic dysfunction, and decompensated JEAN cirrhosis complicated by ascites, history of SBP, hepatic encephalopathy, and chronic anemia with a history of duodenal ulcer as well as GAVE and duodenal AVM s/p APC (last on 10/11/19), who is UNOS listed for liver transplantation at [facility]. He has had multiple recent hospitalizations, including hospitalization from 2/11/20-2/21/20 due to septic shock secondary to emphysematous cystitis and ESBL E. coli bacteremia; from 2/28/20-3/3/20 after a mechanical fall; and most recently, from 3/10/20-3/24/20 due to sepsis secondary to prostatitis and cystitis with ESBL E. coli with evidence of a large prostatic abscess and fistulous tract between the prostate and bladder, s/p TURP and prostatic drainage by Urology on 3/15/20 (prostate biopsies: benign prostate tissue with patchy moderate acute and chronic inflammation), s/p 4-week course of ertapenem (completed on 4/11/20), s/p cystogram and Beasley catheter removal on 4/14/20 followed by a 1-week course of Bactrim (completed on 4/21/20). He is currently re-admitted due to hepatic encephalopathy that was refractory to increased lactulose dosing at home, also with concern for UTI vs R foot infection.
65 yo M with IDDM, dyslipidemia, obesity, GERD, HFpEF with mild LV diastolic dysfunction, and decompensated JEAN cirrhosis complicated by ascites, history of SBP, hepatic encephalopathy, and chronic anemia with a history of duodenal ulcer as well as GAVE and duodenal AVM s/p APC (last on 10/11/19), who is UNOS listed for liver transplantation at [facility]. He has had multiple recent hospitalizations, including hospitalization from 2/11/20-2/21/20 due to septic shock secondary to emphysematous cystitis and ESBL E. coli bacteremia; from 2/28/20-3/3/20 after a mechanical fall; and most recently, from 3/10/20-3/24/20 due to sepsis secondary to prostatitis and cystitis with ESBL E. coli with evidence of a large prostatic abscess and fistulous tract between the prostate and bladder, s/p TURP and prostatic drainage by Urology on 3/15/20 (prostate biopsies: benign prostate tissue with patchy moderate acute and chronic inflammation), s/p 4-week course of ertapenem (completed on 4/11/20), s/p cystogram and Beasley catheter removal on 4/14/20 followed by a 1-week course of Bactrim (completed on 4/21/20). He is currently re-admitted due to hepatic encephalopathy that was refractory to increased lactulose dosing at home, also with concern for UTI vs less likely R foot infection.
65 yo M with IDDM, dyslipidemia, obesity, GERD, HFpEF with mild LV diastolic dysfunction, and decompensated JEAN cirrhosis complicated by ascites, history of SBP, hepatic encephalopathy, and chronic anemia with a history of duodenal ulcer as well as GAVE and duodenal AVM s/p APC (last on 10/11/19), who is UNOS listed for liver transplantation at [facility]. He has had multiple recent hospitalizations, including hospitalization from 2/11/20-2/21/20 due to septic shock secondary to emphysematous cystitis and ESBL E. coli bacteremia; from 2/28/20-3/3/20 after a mechanical fall; and most recently, from 3/10/20-3/24/20 due to sepsis secondary to prostatitis and cystitis with ESBL E. coli with evidence of a large prostatic abscess and fistulous tract between the prostate and bladder, s/p TURP and prostatic drainage by Urology on 3/15/20 (prostate biopsies: benign prostate tissue with patchy moderate acute and chronic inflammation), s/p 4-week course of ertapenem (completed on 4/11/20), s/p cystogram and Beasley catheter removal on 4/14/20 followed by a 1-week course of Bactrim (completed on 4/21/20). He is currently re-admitted due to hepatic encephalopathy that was refractory to increased lactulose dosing at home and VRE UTI
65yo M with IDDM, dyslipidemia, obesity, GERD, HFpEF with mild LV diastolic dysfunction, and decompensated JEAN cirrhosis complicated by ascites, history of SBP, hepatic encephalopathy, and chronic anemia with a history of duodenal ulcer as well as GAVE and duodenal AVM s/p APC (last on 10/11/19), who is UNOS listed for liver transplantation at [facility] presented with hepatic encephalopathy and possible UTI vs R foot infection    1) Anemia  -He has had extensive work up in past, thought to be secondary to anemia of chronic disease as well as component of MGUS, with macrocytosis underlying low grade MDS also in differential  -Would continue to monitor and transfuse for Hgb < 7  -b12, folate adeq  -iron studies c/w mild iron def/inflammation    2) Hyperbilirubinemia  -mixed picture but mostly unconjugated  -secondary to underlying liver disease, ? gilberts  -hemolytic labs checked and nml with nml LDH and hapto decreased from liver disease        3) History of MGUS  -check SPEP/DELORES    4) Throbmocytopenia  -chronic, stable, and likely from cirrhosis/infection  -continue to monitor  -- transfuse for plts <10 if no bleeding, <50 if with bleeding      Juan Carlos Rudd MD  [facility]  Cell: [phone]    Thank you for the courtesy of this consultation and we will continue to follow.    Juan Carlos Rudd MD  [facility]  Cell: [phone]
Assessment/Plan:    --DM noninfected right heel ulcer    --recommend continued use of z flow offloading boots  --recommend continued use of heel offloading sx shoe for ambulation  --continue with ssd and allevyne foam every other day while inpatient  --f/u with current DPM once d/c for continued wound care   --xrays reviewed and no evidence of OM  --will monitor
Assessment/Plan:    --DM noninfected right heel ulcer    --recommend continued use of z flow offloading boots  --recommend continued use of heel offloading sx shoe for ambulation  --continue with ssd and allevyne foam every other day while inpatient  --f/u with current DPM once d/c for continued wound care   --xrays reviewed and no evidence of OM  --will monitor
Impression:     # Acute on chronic hepatic encephalopathy now resolved, patient appears at baseline mental status.    # Decompensated cirrhosis due to JEAN. MELD Na 25. He is listed for liver transplant.   -ascites: trace on imaging. On lasix/aldactone 40(alternating 20/40) and 100 at home  -hepatic encephalopathy on lactulose and rifaximin, currently AAO x 3  -HCC: not seen on CT from this admit.   -varices: EGD in 12/19 showed small varices and GAVE. Pt not on BB therapy.     # Anemia, drop in hemoglobin without observed GI bleeding, possibly due to known GAVE. B12/folate levels nml (anemia is macrocytic)    # UTI: VRE on UC    # Foot ulcer    Recommendation:   - Transfuse to goal hgb of 7-8, monitor for overt GI blood loss  - F/U ID recommendations  - Continue IV lasix 40 BID (can transition to home regimen upon discharge)  - Continue ertapenem x 5 days per ID  - Continue Macrobid per ID  - Continue home lactulose dose  - c/w rifaximin  - c/w midodrine
Impression:     # Acute on chronic hepatic encephalopathy now resolved, patient appears at baseline mental status.    # Decompensated cirrhosis due to JEAN. MELD Na 26 consistent with his baseline. He is listed for liver transplant.   -ascites: trace on imaging. On lasix/aldactone 40(alternating 20/40) and 100.  -hepatic encephalopathy on lactulose and rifaximin, currently AAO x 3  -HCC: not seen on CT from this admit.   -varices: EGD in 12/19 showed small varices and GAVE. Pt not on BB therapy.     # Anemia, drop in hemoglobin without observed GI bleeding, possibly due to known GAVE. B12/folate levels nml (anemia is macrocytic)    # UTI, pending urine cx results    # Foot ulcer    Recommendation:   - Transfuse to goal hgb of 7-8, monitor for overt GI blood loss  - Treat underlying infection, Invanz reasonable choice until culture returns given recent ESBL UTI  - Continue home lactulose dose  - c/w rifaximin  - Re-start home diuretics  - c/w midodrine  - Podiatry consult to r/o infected foot ulcer
Impression:     # Acute on chronic hepatic encephalopathy now resolved, patient appears at baseline mental status.    # Decompensated cirrhosis due to JEAN. MELD Na 26. He is listed for liver transplant.   -ascites: trace on imaging. On lasix/aldactone 40(alternating 20/40) and 100 at home  -hepatic encephalopathy on lactulose and rifaximin, currently AAO x 3  -HCC: not seen on CT from this admit.   -varices: EGD in 12/19 showed small varices and GAVE. Pt not on BB therapy.     # Anemia, drop in hemoglobin without observed GI bleeding, possibly due to known GAVE. B12/folate levels nml (anemia is macrocytic)    # UTI: VRE on UC    # Foot ulcer    Recommendation:   - Transfuse to goal hgb of 7-8, monitor for overt GI blood loss  - F/U ID recommendations  - Continue ertapenem x 5 days per ID  - Continue Macrobid per ID  - Continue home lactulose dose  - c/w rifaximin  - c/w midodrine
Impression:     # Acute on chronic hepatic encephalopathy now resolved, patient appears at baseline mental status.    # Decompensated cirrhosis due to JEAN. MELD Na 26. He is listed for liver transplant.   -ascites: trace on imaging. On lasix/aldactone 40(alternating 20/40) and 100.  -hepatic encephalopathy on lactulose and rifaximin, currently AAO x 3  -HCC: not seen on CT from this admit.   -varices: EGD in 12/19 showed small varices and GAVE. Pt not on BB therapy.     # Anemia, drop in hemoglobin without observed GI bleeding, possibly due to known GAVE. B12/folate levels nml (anemia is macrocytic)    # UTI: VRE on UC    # Foot ulcer    Recommendation:   - Transfuse to goal hgb of 7-8, monitor for overt GI blood loss  - F/U ID recommendations  - Continue ertapenem x 5 days per ID  - Continue Macrobid per ID  - Continue home lactulose dose  - c/w rifaximin  - c/w midodrine
Impression:     1. Acute on chronic hepatic enceophatlopathy, now resolved, patient appears at baseline mental status.    2. Decompensated cirrhosis due to JEAN. MELD Na 26 consistent with his baseline. He is listed for liver transplant.   -ascites: trace on imaging. On lasix/aldactone 40(alternating 20/40) and 100.  -hepatic encephalopathy on lactulose and rifaximin, now with acute exacerbation as above  -HCC: not seen on CT from this admit.   -varices: EGD in 12/19 showed small varices and GAVE. Pt not on BB therapy.     3. Anemia, drop in hemoglobin without observed GI bleeding, possibly due to known GAVE. B12/folate levels nml (anemia is macrocytic)    4. Increase in creatinine to 1.16 from 1.0 at baseline in the setting of infection.     5. UTI, pending urine cx results    6. Foot ulcer    Recommendation:   - transfuse to goal hgb of 7-8, monitor for overt GI blood loss  -treat underlying infection, Invanz reasonable choice until culture returns given recent ESBL UTI  -can resume home lactulose dose  -c/w rifaximin  -hold diuretics for now  -c/w midodrine  -podiatry consult to r/o infected foot ulcer
hepatic encephalopathy - improved  UTI  anemia    on rifaxmin and home-dose of lactulose; mentating at baseline   Ucx + for VRE; patient with recent ESBL UTI. Currently being treated with Ertapenem and Macrobid; ID recs appreciated   s/p 2uprbc 04/26; hgb today 7.8 no overt s/s of GIB  monitor cbc, transfuse to maintain hgb between 7-8 as per transplant hepatology recs  transplant hepatology input much appreciated  follow-up podiatry consult for wound management   will follow
hepatic encephalopathy - improved  UTI  anemia    on rifaxmin and home-dose of lactulose; mentating at baseline   Ucx + for VRE; patient with recent ESBL UTI. Currently being treated with Ertapenem and Macrobid; ID recs appreciated   s/p 2uprbc 04/26; hgb today 7.8 no overt s/s of GIB  monitor cbc, transfuse to maintain hgb between 7-8 as per transplant hepatology recs  transplant hepatology input much appreciated  follow-up podiatry consult for wound management   will follow
hepatic encephalopathy - improved  UTI  anemia    on rifaxmin and home-dose of lactulose; mentating at baseline   Ucx + for enterococcus; patient with recent ESBL UTI. currently being treated with Ertapenem and Macrobid; urology and ID following  s/p 2uprbc 04/26; hgb today 7.5 no overt s/s of GIB  monitor cbc, transfuse to maintain hgb between 7-8 as per transplant hepatology recs  transplant hepatology input much appreciated  will follow
64 year old male JEAN cirrhosis on UNOS transplant list, DM2, Orthostatic hypotension, GERD, HTN, peripheral neuropathy, HFpEF with mild LV diastolic dysfunction who presented to [facility] on 4/24 with encephalopathy.    U/A with 149 WBC.   Urine Cultures with >100K VRE.   COVID19 PCR negative.   CXR clear.   CT A/P with resolution of prostate abscess, small ascites and Nonobstructive 0.6 cm left intrarenal calculus.     Received Ceftriaxone and Ertapenem prior to UCx   Thus, unclear if VRE was selected for (from antimicrobial pressure) and does not represent true pathogen  Patient's encephalopthy improved while on Ertapenem so favor completing 5 days (today is day 5)  Would also complete 7 day course of Macrobid targeting VRE    After completing antibiotics would start on Bactrim SS PO QD for UTI suppression (targeting prior E coli isolates)    Overall, Encephalopathy, Abnormal Urinalysis, R Heel Ulcer, Elevated Bilirubin, VRE Colonization, Pre-OLT Evaluation    --After completing antibiotics would start on Bactrim SS PO QD for UTI suppression (targeting prior E coli isolates)  --Continue Macrobid 100 mg PO BID x 7 days  --Continue Ertapenem 1g IV Q24H (End Date: 4/29)  --Continue wound care to RLE Foot  --No absolute ID contraindication for OLT  --Continue to follow CBC with diff  --Continue to follow transaminases  --Continue to follow temperature curve  --Follow up on preliminary blood cultures    I will sign off at this time. Please feel free to contact me with any further questions or concerns.    Eusebio Pérez M.D.  [facility] Division of Infectious Disease  8AM-5PM: Pager Number [phone]  After Hours (or if no response): Please contact the Infectious Diseases Office at [phone]     The above assessment and plan were discussed with medicine NP and Dr Medina (Transplant Hepatology)
65 yo M with IDDM, dyslipidemia, obesity, GERD, HFpEF with mild LV diastolic dysfunction, and decompensated JEAN cirrhosis complicated by ascites, history of SBP, hepatic encephalopathy, and chronic anemia with a history of duodenal ulcer as well as GAVE and duodenal AVM s/p APC (last on 10/11/19), who is UNOS listed for liver transplantation at [facility]. He has had multiple recent hospitalizations, including hospitalization from 2/11/20-2/21/20 due to septic shock secondary to emphysematous cystitis and ESBL E. coli bacteremia; from 2/28/20-3/3/20 after a mechanical fall; and most recently, from 3/10/20-3/24/20 due to sepsis secondary to prostatitis and cystitis with ESBL E. coli with evidence of a large prostatic abscess and fistulous tract between the prostate and bladder, s/p TURP and prostatic drainage by Urology on 3/15/20 (prostate biopsies: benign prostate tissue with patchy moderate acute and chronic inflammation), s/p 4-week course of ertapenem (completed on 4/11/20), s/p cystogram and Beasley catheter removal on 4/14/20 followed by a 1-week course of Bactrim (completed on 4/21/20). He is currently re-admitted due to hepatic encephalopathy that was refractory to increased lactulose dosing at home, also with concern for UTI vs less likely R foot infection.

## 2020-04-30 NOTE — PROGRESS NOTE ADULT - PROBLEM SELECTOR PLAN 1
improved mentation with Lactulose.  Moving bowels 3-8x/day, given patient susceptible to encephalopathy, aim for >3 BM per day

## 2020-04-30 NOTE — PROGRESS NOTE ADULT - PROBLEM SELECTOR PROBLEM 4
Orthostatic hypotension
Tachycardia
Tachycardia

## 2020-04-30 NOTE — PROGRESS NOTE ADULT - PROBLEM SELECTOR PLAN 10
--DM noninfected right heel ulcer  --recommend continued use of z flow offloading boots  --recommend continued use of heel offloading sx shoe for ambulation  --continue with ssd and allevyne foam every other day while inpatient  --f/u with current DPM once d/c for continued wound care   --xrays reviewed and no evidence of OM  No osteo on MRI  Heel pads

## 2020-05-05 ENCOUNTER — APPOINTMENT (OUTPATIENT)
Dept: UROLOGY | Facility: CLINIC | Age: 65
End: 2020-05-05

## 2020-05-05 LAB
% ALBUMIN: 53.6 % — SIGNIFICANT CHANGE UP
% ALPHA 1: 1.9 % — SIGNIFICANT CHANGE UP
% ALPHA 2: 4 % — SIGNIFICANT CHANGE UP
% BETA: 14.6 % — SIGNIFICANT CHANGE UP
% GAMMA: 25.9 % — SIGNIFICANT CHANGE UP
% M SPIKE: SIGNIFICANT CHANGE UP
ALBUMIN SERPL ELPH-MCNC: 2.8 G/DL — LOW (ref 3.6–5.5)
ALBUMIN/GLOB SERPL ELPH: 1.2 RATIO — SIGNIFICANT CHANGE UP
ALPHA1 GLOB SERPL ELPH-MCNC: 0.1 G/DL — SIGNIFICANT CHANGE UP (ref 0.1–0.4)
ALPHA2 GLOB SERPL ELPH-MCNC: 0.2 G/DL — LOW (ref 0.5–1)
B-GLOBULIN SERPL ELPH-MCNC: 0.8 G/DL — SIGNIFICANT CHANGE UP (ref 0.5–1)
GAMMA GLOBULIN: 1.3 G/DL — SIGNIFICANT CHANGE UP (ref 0.6–1.6)
INTERPRETATION SERPL IFE-IMP: SIGNIFICANT CHANGE UP
M-SPIKE: SIGNIFICANT CHANGE UP (ref 0–0)
PROT PATTERN SERPL ELPH-IMP: SIGNIFICANT CHANGE UP

## 2020-05-29 RX ORDER — SYRING-NEEDL,DISP,INSUL,0.3 ML 31 GX5/16"
31G X 5/16" SYRINGE, EMPTY DISPOSABLE MISCELLANEOUS
Qty: 1 | Refills: 3 | Status: ACTIVE | COMMUNITY
Start: 2020-05-29 | End: 1900-01-01

## 2020-06-10 ENCOUNTER — APPOINTMENT (OUTPATIENT)
Dept: HEPATOLOGY | Facility: CLINIC | Age: 65
End: 2020-06-10
Payer: MEDICARE

## 2020-06-10 VITALS
TEMPERATURE: 98.2 F | DIASTOLIC BLOOD PRESSURE: 55 MMHG | RESPIRATION RATE: 13 BRPM | OXYGEN SATURATION: 100 % | SYSTOLIC BLOOD PRESSURE: 99 MMHG | HEART RATE: 80 BPM | HEIGHT: 72 IN | BODY MASS INDEX: 32.37 KG/M2 | WEIGHT: 239 LBS

## 2020-06-10 PROCEDURE — 99214 OFFICE O/P EST MOD 30 MIN: CPT

## 2020-06-11 ENCOUNTER — INPATIENT (INPATIENT)
Facility: HOSPITAL | Age: 65
LOS: 60 days | Discharge: HOME HEALTH | DRG: 5 | End: 2020-08-11
Attending: TRANSPLANT SURGERY | Admitting: HOSPITALIST
Payer: COMMERCIAL

## 2020-06-11 VITALS
SYSTOLIC BLOOD PRESSURE: 94 MMHG | TEMPERATURE: 99 F | OXYGEN SATURATION: 99 % | RESPIRATION RATE: 15 BRPM | DIASTOLIC BLOOD PRESSURE: 52 MMHG | HEART RATE: 80 BPM

## 2020-06-11 DIAGNOSIS — E11.9 TYPE 2 DIABETES MELLITUS WITHOUT COMPLICATIONS: ICD-10-CM

## 2020-06-11 DIAGNOSIS — K72.00 ACUTE AND SUBACUTE HEPATIC FAILURE WITHOUT COMA: ICD-10-CM

## 2020-06-11 DIAGNOSIS — K21.0 GASTRO-ESOPHAGEAL REFLUX DISEASE WITH ESOPHAGITIS: ICD-10-CM

## 2020-06-11 DIAGNOSIS — Z02.9 ENCOUNTER FOR ADMINISTRATIVE EXAMINATIONS, UNSPECIFIED: ICD-10-CM

## 2020-06-11 DIAGNOSIS — Z29.9 ENCOUNTER FOR PROPHYLACTIC MEASURES, UNSPECIFIED: ICD-10-CM

## 2020-06-11 DIAGNOSIS — I50.89 OTHER HEART FAILURE: ICD-10-CM

## 2020-06-11 DIAGNOSIS — R41.0 DISORIENTATION, UNSPECIFIED: ICD-10-CM

## 2020-06-11 DIAGNOSIS — B37.81 CANDIDAL ESOPHAGITIS: ICD-10-CM

## 2020-06-11 DIAGNOSIS — N17.9 ACUTE KIDNEY FAILURE, UNSPECIFIED: ICD-10-CM

## 2020-06-11 DIAGNOSIS — K72.90 HEPATIC FAILURE, UNSPECIFIED WITHOUT COMA: ICD-10-CM

## 2020-06-11 DIAGNOSIS — D64.9 ANEMIA, UNSPECIFIED: ICD-10-CM

## 2020-06-11 DIAGNOSIS — Z90.49 ACQUIRED ABSENCE OF OTHER SPECIFIED PARTS OF DIGESTIVE TRACT: Chronic | ICD-10-CM

## 2020-06-11 LAB
ALBUMIN SERPL ELPH-MCNC: 3 G/DL
ALBUMIN SERPL ELPH-MCNC: 3.1 G/DL — LOW (ref 3.3–5)
ALP BLD-CCNC: 210 U/L
ALP SERPL-CCNC: 191 U/L — HIGH (ref 40–120)
ALT FLD-CCNC: 20 U/L — SIGNIFICANT CHANGE UP (ref 10–45)
ALT SERPL-CCNC: 19 U/L
AMMONIA BLD-MCNC: 71 UMOL/L — HIGH (ref 11–55)
ANION GAP SERPL CALC-SCNC: 11 MMOL/L — SIGNIFICANT CHANGE UP (ref 5–17)
ANION GAP SERPL CALC-SCNC: 12 MMOL/L
ANISOCYTOSIS BLD QL: SLIGHT — SIGNIFICANT CHANGE UP
APPEARANCE UR: CLEAR — SIGNIFICANT CHANGE UP
APTT BLD: 37.5 SEC — HIGH (ref 27.5–36.3)
AST SERPL-CCNC: 27 U/L — SIGNIFICANT CHANGE UP (ref 10–40)
AST SERPL-CCNC: 28 U/L
BACTERIA # UR AUTO: ABNORMAL
BASOPHILS # BLD AUTO: 0.04 K/UL — SIGNIFICANT CHANGE UP (ref 0–0.2)
BASOPHILS # BLD AUTO: 0.05 K/UL
BASOPHILS NFR BLD AUTO: 0.9 % — SIGNIFICANT CHANGE UP (ref 0–2)
BASOPHILS NFR BLD AUTO: 1.3 %
BILIRUB SERPL-MCNC: 8.9 MG/DL
BILIRUB SERPL-MCNC: 9.2 MG/DL — HIGH (ref 0.2–1.2)
BILIRUB UR-MCNC: NEGATIVE — SIGNIFICANT CHANGE UP
BUN SERPL-MCNC: 31 MG/DL
BUN SERPL-MCNC: 31 MG/DL — HIGH (ref 7–23)
CALCIUM SERPL-MCNC: 9.6 MG/DL
CALCIUM SERPL-MCNC: 9.7 MG/DL — SIGNIFICANT CHANGE UP (ref 8.4–10.5)
CHLORIDE SERPL-SCNC: 97 MMOL/L — SIGNIFICANT CHANGE UP (ref 96–108)
CHLORIDE SERPL-SCNC: 98 MMOL/L
CO2 SERPL-SCNC: 22 MMOL/L
CO2 SERPL-SCNC: 22 MMOL/L — SIGNIFICANT CHANGE UP (ref 22–31)
COLOR SPEC: YELLOW — SIGNIFICANT CHANGE UP
CREAT ?TM UR-MCNC: 61 MG/DL — SIGNIFICANT CHANGE UP
CREAT SERPL-MCNC: 1.55 MG/DL
CREAT SERPL-MCNC: 1.57 MG/DL — HIGH (ref 0.5–1.3)
DIFF PNL FLD: ABNORMAL
ELLIPTOCYTES BLD QL SMEAR: SLIGHT — SIGNIFICANT CHANGE UP
EOSINOPHIL # BLD AUTO: 0.05 K/UL
EOSINOPHIL # BLD AUTO: 0.11 K/UL — SIGNIFICANT CHANGE UP (ref 0–0.5)
EOSINOPHIL NFR BLD AUTO: 1.3 %
EOSINOPHIL NFR BLD AUTO: 2.6 % — SIGNIFICANT CHANGE UP (ref 0–6)
EPI CELLS # UR: 0 /HPF — SIGNIFICANT CHANGE UP
GLUCOSE SERPL-MCNC: 222 MG/DL — HIGH (ref 70–99)
GLUCOSE SERPL-MCNC: 242 MG/DL
GLUCOSE UR QL: NEGATIVE — SIGNIFICANT CHANGE UP
HCT VFR BLD CALC: 22.9 % — LOW (ref 39–50)
HCT VFR BLD CALC: 25.1 %
HGB BLD-MCNC: 7.6 G/DL — LOW (ref 13–17)
HGB BLD-MCNC: 8.2 G/DL
HYALINE CASTS # UR AUTO: 0 /LPF — SIGNIFICANT CHANGE UP (ref 0–2)
IMM GRANULOCYTES NFR BLD AUTO: 0.8 %
INR BLD: 2.03 RATIO — HIGH (ref 0.88–1.16)
INR PPP: 1.9 RATIO
KETONES UR-MCNC: NEGATIVE — SIGNIFICANT CHANGE UP
LEUKOCYTE ESTERASE UR-ACNC: ABNORMAL
LYMPHOCYTES # BLD AUTO: 0.74 K/UL — LOW (ref 1–3.3)
LYMPHOCYTES # BLD AUTO: 0.85 K/UL
LYMPHOCYTES # BLD AUTO: 17.4 % — SIGNIFICANT CHANGE UP (ref 13–44)
LYMPHOCYTES NFR BLD AUTO: 21.4 %
MACROCYTES BLD QL: SIGNIFICANT CHANGE UP
MAN DIFF?: NORMAL
MANUAL SMEAR VERIFICATION: SIGNIFICANT CHANGE UP
MCHC RBC-ENTMCNC: 32.7 GM/DL
MCHC RBC-ENTMCNC: 33.2 GM/DL — SIGNIFICANT CHANGE UP (ref 32–36)
MCHC RBC-ENTMCNC: 36.8 PG
MCHC RBC-ENTMCNC: 36.9 PG — HIGH (ref 27–34)
MCV RBC AUTO: 111.2 FL — HIGH (ref 80–100)
MCV RBC AUTO: 112.6 FL
MONOCYTES # BLD AUTO: 0.07 K/UL — SIGNIFICANT CHANGE UP (ref 0–0.9)
MONOCYTES # BLD AUTO: 0.31 K/UL
MONOCYTES NFR BLD AUTO: 1.7 % — LOW (ref 2–14)
MONOCYTES NFR BLD AUTO: 7.8 %
NEUTROPHILS # BLD AUTO: 2.69 K/UL
NEUTROPHILS # BLD AUTO: 3.28 K/UL — SIGNIFICANT CHANGE UP (ref 1.8–7.4)
NEUTROPHILS NFR BLD AUTO: 67.4 %
NEUTROPHILS NFR BLD AUTO: 77.4 % — HIGH (ref 43–77)
NITRITE UR-MCNC: NEGATIVE — SIGNIFICANT CHANGE UP
OVALOCYTES BLD QL SMEAR: SLIGHT — SIGNIFICANT CHANGE UP
PH UR: 5 — SIGNIFICANT CHANGE UP (ref 5–8)
PLAT MORPH BLD: NORMAL — SIGNIFICANT CHANGE UP
PLATELET # BLD AUTO: 65 K/UL — LOW (ref 150–400)
PLATELET # BLD AUTO: 77 K/UL
POIKILOCYTOSIS BLD QL AUTO: SLIGHT — SIGNIFICANT CHANGE UP
POTASSIUM SERPL-MCNC: 4.7 MMOL/L — SIGNIFICANT CHANGE UP (ref 3.5–5.3)
POTASSIUM SERPL-SCNC: 4.7 MMOL/L — SIGNIFICANT CHANGE UP (ref 3.5–5.3)
POTASSIUM SERPL-SCNC: 5.4 MMOL/L
PROT ?TM UR-MCNC: 10 MG/DL — SIGNIFICANT CHANGE UP (ref 0–12)
PROT SERPL-MCNC: 6.3 G/DL
PROT SERPL-MCNC: 6.3 G/DL — SIGNIFICANT CHANGE UP (ref 6–8.3)
PROT UR-MCNC: NEGATIVE — SIGNIFICANT CHANGE UP
PROT/CREAT UR-RTO: 0.2 RATIO — SIGNIFICANT CHANGE UP (ref 0–0.2)
PROTHROM AB SERPL-ACNC: 23.7 SEC — HIGH (ref 10–12.9)
PT BLD: 22.1 SEC
RBC # BLD: 2.06 M/UL — LOW (ref 4.2–5.8)
RBC # BLD: 2.23 M/UL
RBC # FLD: 15.4 % — HIGH (ref 10.3–14.5)
RBC # FLD: 15.7 %
RBC BLD AUTO: ABNORMAL
RBC CASTS # UR COMP ASSIST: 4 /HPF — SIGNIFICANT CHANGE UP (ref 0–4)
SARS-COV-2 RNA SPEC QL NAA+PROBE: SIGNIFICANT CHANGE UP
SCHISTOCYTES BLD QL AUTO: SLIGHT — SIGNIFICANT CHANGE UP
SODIUM SERPL-SCNC: 130 MMOL/L — LOW (ref 135–145)
SODIUM SERPL-SCNC: 132 MMOL/L
SODIUM UR-SCNC: 112 MMOL/L — SIGNIFICANT CHANGE UP
SP GR SPEC: 1.01 — SIGNIFICANT CHANGE UP (ref 1.01–1.02)
TARGETS BLD QL SMEAR: SLIGHT — SIGNIFICANT CHANGE UP
UROBILINOGEN FLD QL: ABNORMAL
WBC # BLD: 4.24 K/UL — SIGNIFICANT CHANGE UP (ref 3.8–10.5)
WBC # FLD AUTO: 3.98 K/UL
WBC # FLD AUTO: 4.24 K/UL — SIGNIFICANT CHANGE UP (ref 3.8–10.5)
WBC UR QL: 54 /HPF — HIGH (ref 0–5)

## 2020-06-11 PROCEDURE — 99285 EMERGENCY DEPT VISIT HI MDM: CPT

## 2020-06-11 PROCEDURE — 99223 1ST HOSP IP/OBS HIGH 75: CPT | Mod: GC

## 2020-06-11 PROCEDURE — 73630 X-RAY EXAM OF FOOT: CPT | Mod: 26,RT

## 2020-06-11 PROCEDURE — 70450 CT HEAD/BRAIN W/O DYE: CPT | Mod: 26

## 2020-06-11 RX ORDER — LACTULOSE 10 G/15ML
20 SOLUTION ORAL EVERY 8 HOURS
Refills: 0 | Status: DISCONTINUED | OUTPATIENT
Start: 2020-06-11 | End: 2020-06-13

## 2020-06-11 RX ORDER — GLUCAGON INJECTION, SOLUTION 0.5 MG/.1ML
1 INJECTION, SOLUTION SUBCUTANEOUS ONCE
Refills: 0 | Status: DISCONTINUED | OUTPATIENT
Start: 2020-06-11 | End: 2020-06-22

## 2020-06-11 RX ORDER — SODIUM CHLORIDE 9 MG/ML
1000 INJECTION, SOLUTION INTRAVENOUS
Refills: 0 | Status: DISCONTINUED | OUTPATIENT
Start: 2020-06-11 | End: 2020-06-22

## 2020-06-11 RX ORDER — DEXTROSE 50 % IN WATER 50 %
25 SYRINGE (ML) INTRAVENOUS ONCE
Refills: 0 | Status: DISCONTINUED | OUTPATIENT
Start: 2020-06-11 | End: 2020-06-21

## 2020-06-11 RX ORDER — INSULIN LISPRO 100/ML
3 VIAL (ML) SUBCUTANEOUS
Refills: 0 | Status: DISCONTINUED | OUTPATIENT
Start: 2020-06-11 | End: 2020-06-30

## 2020-06-11 RX ORDER — INSULIN GLARGINE 100 [IU]/ML
4 INJECTION, SOLUTION SUBCUTANEOUS AT BEDTIME
Refills: 0 | Status: DISCONTINUED | OUTPATIENT
Start: 2020-06-11 | End: 2020-06-21

## 2020-06-11 RX ORDER — TRAMADOL HYDROCHLORIDE 50 MG/1
25 TABLET ORAL
Refills: 0 | Status: DISCONTINUED | OUTPATIENT
Start: 2020-06-11 | End: 2020-06-17

## 2020-06-11 RX ORDER — FUROSEMIDE 40 MG
1 TABLET ORAL
Qty: 0 | Refills: 0 | DISCHARGE

## 2020-06-11 RX ORDER — LACTULOSE 10 G/15ML
10 SOLUTION ORAL ONCE
Refills: 0 | Status: COMPLETED | OUTPATIENT
Start: 2020-06-11 | End: 2020-06-11

## 2020-06-11 RX ORDER — FOLIC ACID 0.8 MG
1 TABLET ORAL DAILY
Refills: 0 | Status: DISCONTINUED | OUTPATIENT
Start: 2020-06-11 | End: 2020-07-02

## 2020-06-11 RX ORDER — NYSTATIN 500MM UNIT
500000 POWDER (EA) MISCELLANEOUS
Refills: 0 | Status: DISCONTINUED | OUTPATIENT
Start: 2020-06-11 | End: 2020-07-02

## 2020-06-11 RX ORDER — INSULIN LISPRO 100/ML
VIAL (ML) SUBCUTANEOUS
Refills: 0 | Status: DISCONTINUED | OUTPATIENT
Start: 2020-06-11 | End: 2020-06-11

## 2020-06-11 RX ORDER — TAMSULOSIN HYDROCHLORIDE 0.4 MG/1
0.4 CAPSULE ORAL AT BEDTIME
Refills: 0 | Status: DISCONTINUED | OUTPATIENT
Start: 2020-06-11 | End: 2020-07-02

## 2020-06-11 RX ORDER — DEXTROSE 50 % IN WATER 50 %
15 SYRINGE (ML) INTRAVENOUS ONCE
Refills: 0 | Status: DISCONTINUED | OUTPATIENT
Start: 2020-06-11 | End: 2020-06-21

## 2020-06-11 RX ORDER — MIDODRINE HYDROCHLORIDE 2.5 MG/1
20 TABLET ORAL THREE TIMES A DAY
Refills: 0 | Status: DISCONTINUED | OUTPATIENT
Start: 2020-06-11 | End: 2020-07-02

## 2020-06-11 RX ORDER — SPIRONOLACTONE 25 MG/1
100 TABLET, FILM COATED ORAL DAILY
Refills: 0 | Status: DISCONTINUED | OUTPATIENT
Start: 2020-06-11 | End: 2020-06-11

## 2020-06-11 RX ORDER — PANTOPRAZOLE SODIUM 20 MG/1
40 TABLET, DELAYED RELEASE ORAL
Refills: 0 | Status: DISCONTINUED | OUTPATIENT
Start: 2020-06-11 | End: 2020-06-17

## 2020-06-11 RX ORDER — INSULIN GLARGINE 100 [IU]/ML
4 INJECTION, SOLUTION SUBCUTANEOUS
Qty: 0 | Refills: 0 | DISCHARGE

## 2020-06-11 RX ORDER — DEXTROSE 50 % IN WATER 50 %
12.5 SYRINGE (ML) INTRAVENOUS ONCE
Refills: 0 | Status: DISCONTINUED | OUTPATIENT
Start: 2020-06-11 | End: 2020-06-21

## 2020-06-11 RX ORDER — ALBUMIN HUMAN 25 %
100 VIAL (ML) INTRAVENOUS EVERY 6 HOURS
Refills: 0 | Status: DISCONTINUED | OUTPATIENT
Start: 2020-06-11 | End: 2020-06-13

## 2020-06-11 RX ORDER — INSULIN LISPRO 100/ML
VIAL (ML) SUBCUTANEOUS
Refills: 0 | Status: DISCONTINUED | OUTPATIENT
Start: 2020-06-11 | End: 2020-06-30

## 2020-06-11 RX ORDER — INSULIN LISPRO 100/ML
VIAL (ML) SUBCUTANEOUS AT BEDTIME
Refills: 0 | Status: DISCONTINUED | OUTPATIENT
Start: 2020-06-11 | End: 2020-06-30

## 2020-06-11 RX ORDER — NYSTATIN CREAM 100000 [USP'U]/G
1 CREAM TOPICAL
Refills: 0 | Status: DISCONTINUED | OUTPATIENT
Start: 2020-06-11 | End: 2020-08-11

## 2020-06-11 RX ADMIN — Medication 1 TABLET(S): at 18:04

## 2020-06-11 RX ADMIN — TAMSULOSIN HYDROCHLORIDE 0.4 MILLIGRAM(S): 0.4 CAPSULE ORAL at 23:02

## 2020-06-11 RX ADMIN — Medication 50 MILLILITER(S): at 15:21

## 2020-06-11 RX ADMIN — Medication 2: at 18:08

## 2020-06-11 RX ADMIN — Medication 3 UNIT(S): at 18:08

## 2020-06-11 RX ADMIN — LACTULOSE 20 GRAM(S): 10 SOLUTION ORAL at 23:02

## 2020-06-11 RX ADMIN — Medication 500000 UNIT(S): at 23:02

## 2020-06-11 RX ADMIN — LACTULOSE 10 GRAM(S): 10 SOLUTION ORAL at 13:08

## 2020-06-11 RX ADMIN — INSULIN GLARGINE 4 UNIT(S): 100 INJECTION, SOLUTION SUBCUTANEOUS at 23:01

## 2020-06-11 RX ADMIN — Medication 1 APPLICATION(S): at 18:05

## 2020-06-11 RX ADMIN — MIDODRINE HYDROCHLORIDE 20 MILLIGRAM(S): 2.5 TABLET ORAL at 18:04

## 2020-06-11 RX ADMIN — NYSTATIN CREAM 1 APPLICATION(S): 100000 CREAM TOPICAL at 18:04

## 2020-06-11 RX ADMIN — Medication 1 MILLIGRAM(S): at 18:04

## 2020-06-11 RX ADMIN — Medication 500000 UNIT(S): at 18:05

## 2020-06-11 NOTE — H&P ADULT - PROBLEM SELECTOR PLAN 4
HFpEF (EF 70%)  Appears euvolemic and compensated  - hold lasix in setting of dena, reevaluate fluid status tomorrow and restart when able HFpEF (EF 70%)  Appears euvolemic vs possible hypovolemic.   - hold diuretics in setting of dena, reevaluate fluid status tomorrow and restart when able  - Monitor for volume overload while on albumin.

## 2020-06-11 NOTE — H&P ADULT - PROBLEM SELECTOR PLAN 10
Transitions of Care Status:  1.  Name of PCP:   2.  PCP Contacted on Admission: [ ] Y  [ ] N   [ ] N/A  3.  PCP contacted at Discharge:    [ ] Y  [ ] N   [ ] N/A  4.  Post-Discharge Appointment Date and Location:  5.  Summary of Handoff given to PCP: Transitions of Care Status:  1.  Name of PCP:    2.  PCP Contacted on Admission: [ ] Y  [ ] N   [ ] N/A  3.  PCP contacted at Discharge:    [ ] Y  [ ] N   [ ] N/A  4.  Post-Discharge Appointment Date and Location:  5.  Summary of Handoff given to PCP:

## 2020-06-11 NOTE — H&P ADULT - PROBLEM SELECTOR PLAN 3
MISA likely in setting of poor PO intake and hypovolemia   - hold lasix   - f/u Bladder scan  - if persistent f/u FeUREa MISA likely in setting of poor PO intake and hypovolemia   - albumin 25% 100ml Q6  - hold lasix   - f/u Bladder scan  - if persistent f/u FeUREa MISA likely in setting of poor PO intake and hypovolemia   - albumin 25% 100ml Q6  - hold diuretics.  - f/u Bladder scan for PVR.   - Check urine lytes.  - C/w home midodrine.

## 2020-06-11 NOTE — ED PROVIDER NOTE - PSYCHIATRIC NEGATIVE STATEMENT, MLM
Physical Exam:  Gen: NAD, AOx3, non-toxic appearing, able to ambulate without assistance  Head: NCAT  HEENT: EOMI, PEERLA, normal conjunctiva, tongue midline, oral mucosa moist  Lung: CTAB, no respiratory distress, no wheezes/rhonchi/rales B/L, speaking in full sentences  CV: RRR, no murmurs, rubs or gallops  Abd: soft, NT, ND, no guarding, no rigidity, no rebound tenderness, +L CVA tenderness   MSK: no visible deformities, ROM normal in UE/LE, no back pain  Neuro: No focal sensory or motor deficits  Skin: Warm, well perfused, no rash, no leg swelling  Psych: normal affect, calm  ~Devin Monte D.O. -Resident
no known mental health issues.

## 2020-06-11 NOTE — H&P ADULT - PROBLEM SELECTOR PLAN 5
Insulin dependent DM  A1c in april 4.8  c/w home regimen: Lantus 4units QHS, humalog 3 un qAc  - ISS

## 2020-06-11 NOTE — H&P ADULT - NSHPREVIEWOFSYSTEMS_GEN_ALL_CORE
CONSTITUTIONAL: No fever, weight loss, or fatigue  EYES: No eye pain, visual disturbances, or discharge  ENMT: No difficulty hearing, tinnitus, vertigo; No sinus or throat pain  RESPIRATORY: No cough, wheezing, chills or hemoptysis; No shortness of breath  CARDIOVASCULAR: No chest pain, palpitations, dizziness, or leg swelling  GASTROINTESTINAL: No abdominal or epigastric pain. No nausea, vomiting, or hematemesis; No diarrhea or constipation. No melena or hematochezia.  GENITOURINARY: No dysuria, frequency, hematuria, or incontinence  NEUROLOGICAL: + confusion   SKIN: +heel ulcer   LYMPH NODES: No enlarged glands  ENDOCRINE: No heat or cold intolerance; No polydipsia or polyuria  MUSCULOSKELETAL: No joint pain or swelling;   PSYCHIATRIC: Denies depression, anxiety  HEME/LYMPH: No easy bruising, or bleeding gums  ALLERGY AND IMMUNOLOGIC: No hives or eczema CONSTITUTIONAL: No fever, weight loss, or fatigue  EYES: No eye pain, visual disturbances, or discharge  ENMT: No difficulty hearing, tinnitus, vertigo; No sinus or throat pain  RESPIRATORY: No cough, wheezing, chills or hemoptysis; No shortness of breath  CARDIOVASCULAR: No chest pain, palpitations, dizziness.  GASTROINTESTINAL: No abdominal or epigastric pain. No nausea, vomiting, or hematemesis; No diarrhea or constipation. No melena or hematochezia.  GENITOURINARY: No dysuria, frequency, hematuria, or incontinence  NEUROLOGICAL: + confusion   SKIN: +heel ulcer   LYMPH NODES: No enlarged glands  ENDOCRINE: No heat or cold intolerance; No polydipsia or polyuria  MUSCULOSKELETAL: No joint pain or swelling;   PSYCHIATRIC: Denies depression, anxiety  HEME/LYMPH: No easy bruising, or bleeding gums  ALLERGY AND IMMUNOLOGIC: No hives or eczema

## 2020-06-11 NOTE — ED PROVIDER NOTE - CLINICAL SUMMARY MEDICAL DECISION MAKING FREE TEXT BOX
ATTG: : ams  brief and resolved, concern for worsening liver failure, infection check labs, check ct head, check urinalysis, check coags, GI eval, admit to hospital for further care.   MELD score 30, in setting of worsening bili, renal insufficiency, and  low sodium.

## 2020-06-11 NOTE — H&P ADULT - ASSESSMENT
65 yo M PMHx of decompensated JEAN cirrhosis, HTn, HLD, DM, HFpEF (EF 70%) presents with acute liver failure, and reported hepatic encephalopathy

## 2020-06-11 NOTE — CONSULT NOTE ADULT - ATTENDING COMMENTS
Patient listed for liver transplant. admitted for exacerbation of PSE.  U/A shows elevated wbc.  await culture and suggest consult transplant ID to follow due to prior prostate abscess.  Patient with foot ulceration , suggest consult podiatry to follow.    Encephalopathy cleared on lactulose/rifaximin.  Note slight rise in creat.  suggest trend renal function. possible UTI may have precipitated PSE.  Will alert kimmie tteam to his admission.

## 2020-06-11 NOTE — ED PROVIDER NOTE - PROGRESS NOTE DETAILS
Spoke with wife, Ada, who stated that patient has a history of high ammonia levels (up to 120s) and when they get that high he gets altered. When he does so, he'll ask many questions in order to memorize the answers so he does not appear as altered. She stated that he seemed confused yesterday and appeared more yellow than usual. When she went into his room this morning he had urinated on the floor which has never happened prior. Wife aware of situation and agrees with plan of possible admission.   -Joshua Robison PA-C

## 2020-06-11 NOTE — H&P ADULT - NSHPLABSRESULTS_GEN_ALL_CORE
LABS:                        7.6    4.24  )-----------( 65       ( 11 Jun 2020 11:33 )             22.9     11 Jun 2020 11:33    130    |  97     |  31     ----------------------------<  222    4.7     |  22     |  1.57     Ca    9.7        11 Jun 2020 11:33    TPro  6.3    /  Alb  3.1    /  TBili  9.2    /  DBili  x      /  AST  27     /  ALT  20     /  AlkPhos  191    11 Jun 2020 11:33    PT/INR - ( 11 Jun 2020 11:33 )   PT: 23.7 sec;   INR: 2.03 ratio    PTT - ( 11 Jun 2020 11:33 )  PTT:37.5 sec    CAPILLARY BLOOD GLUCOSE  POCT Blood Glucose.: 196 mg/dL (11 Jun 2020 10:22)    RADIOLOGY & ADDITIONAL TESTS:  CTH - neg   Xray foot- no evidence of osteomyelitis, no fractures     Imaging Personally Reviewed:  [x] YES LABS:                        7.6    4.24  )-----------( 65       ( 11 Jun 2020 11:33 )             22.9     11 Jun 2020 11:33    130    |  97     |  31     ----------------------------<  222    4.7     |  22     |  1.57     Ca    9.7        11 Jun 2020 11:33    TPro  6.3    /  Alb  3.1    /  TBili  9.2    /  DBili  x      /  AST  27     /  ALT  20     /  AlkPhos  191    11 Jun 2020 11:33    PT/INR - ( 11 Jun 2020 11:33 )   PT: 23.7 sec;   INR: 2.03 ratio    PTT - ( 11 Jun 2020 11:33 )  PTT:37.5 sec    CAPILLARY BLOOD GLUCOSE  POCT Blood Glucose.: 196 mg/dL (11 Jun 2020 10:22)    RADIOLOGY & ADDITIONAL TESTS:  CTH - neg   Xray foot- no evidence of osteomyelitis, no fractures     Imaging Personally Reviewed:  [x] YES    Spoke to consultants: yes -- GI.

## 2020-06-11 NOTE — H&P ADULT - PROBLEM SELECTOR PLAN 1
Decompensated cirrhosis due to JEAN, MELD-Na 30   EGD in 12/19 showed small varices and GAVE  POCUS in ED with no pocket amenable to safely perform paracentesis   - Hepatology consult for evaluation of potential liver transplant   - bilirubin 9.2   - Trend LFT, coags, BMP  - c/w spironolactone Decompensated cirrhosis due to JEAN, MELD-Na 30   EGD in 12/19 showed small varices and GAVE  POCUS in ED with no pocket amenable to safely perform paracentesis   - Hepatology consult for evaluation of potential liver transplant   - bilirubin 9.2   - Trend LFT, coags, BMP  - hold spironolactone Decompensated cirrhosis due to JEAN, MELD-Na 30   EGD in 12/19 showed small varices and GAVE  POCUS in ED with no pocket amenable to safely perform paracentesis   GI/Hepatology consult appreciated.   - bilirubin 9.2   - Trend LFT, coags, BMP  - hold diuretics as below.

## 2020-06-11 NOTE — H&P ADULT - ATTENDING COMMENTS
Patient seen and examined today. I agree with the above findings, assessment, and plan with the following additions and exceptions:     In brief, 64 year old male PMHx of decompensated JEAN cirrhosis (with history of GAVE, AVMs, SBP, and hepatic encephelopathy), multiple UTIs, essential HTN, HLD, Type II DM, and HFpEF (EF 70%) who presents with toxic metabolic encephalopathy. Currently does not appear encephalopathic. A&Ox3. No clear asterixis. Mild hand tremor b/l. No UMN signs on exam. No toxic habits. No bloody bowel movements or abdominal pain. Brown stool. No s/s of blood loss. POCUS in ED without ascites to tap. Patient reports he has not been having 1-2 BMs a day thus possible that lactulose under dosed. Titrate lactulose to at least 3BMs a day. Continue rifaximin. As above, abd is nontender. Right heel does not appear infected. Given history of prior UTIs, will evaluate with UA and Ucx. MISA noted on labs. Send urine lytes. Get PVR bladder scan. Hold diuretics. Start 100 cc 25% albumin q6hrs for now. Hold diuretics. No need for free water restriction at this point in time as Na only 130. Monitor vitals and labs closely. Case discussed with Dr. Mai who will notify Dr. Medina. Assistance with case greatly appreciated.   Rest of plan as above    Dr. Gianluca Godoy DO  Attending Physician  Division of Hospital Medicine  [facility]  Pager:  [phone]

## 2020-06-11 NOTE — H&P ADULT - PROBLEM SELECTOR PLAN 2
Reported confusion and altered behavior at home  Now at baseline mental status, reported compliance with lactulose/rifaximin at home  - r/u UTI, c/w suppressive Bactrim   - C/w lactulose 10g Q8, aim for 4-5 BM per day  - c/w rifaximin 550mh PO BID Reported confusion and altered behavior at home  Now at baseline mental status, reported compliance with lactulose/rifaximin at home  - r/o UTI, c/w suppressive Bactrim   - R heel ulcer is not infected.  - C/w lactulose 10g Q8, aim for at least 3 BM per day  - c/w rifaximin 550mh PO BID  - Treat MISA as below.   - GI assistance appreciated.

## 2020-06-11 NOTE — H&P ADULT - PROBLEM SELECTOR PLAN 9
Transitions of Care Status:  1.  Name of PCP:   2.  PCP Contacted on Admission: [ ] Y  [ ] N   [ ] N/A  3.  PCP contacted at Discharge:    [ ] Y  [ ] N   [ ] N/A  4.  Post-Discharge Appointment Date and Location:  5.  Summary of Handoff given to PCP: DVT: SCDs   Diet: DASH/TLC/CC  Code: Full   Dispo: Home DVT: SCDs alone given elevated inr.   Diet: DASH/TLC/CC  Code: Full   Dispo: Home

## 2020-06-11 NOTE — H&P ADULT - PROBLEM SELECTOR PLAN 6
Unclear etiology EMR reviewed:   Chronic slow bleed 2/2 known AVM/duodenal GAVE vs mild iron deficiency/spur cell anemia in setting on decompensated cirrhosis; also concern for MGUS and Gilbert? given unconjugated bilirubin. Was supposed to have an EGD to eval source of bleeding but was deferred due to COVID  - Present with worsening anemia but asymptomatic   - Trend CBC  - f/u haptoglobin/retic/LDH/direct bilirubin Unclear etiology EMR reviewed:   Chronic slow bleed 2/2 known AVM/duodenal GAVE vs mild iron deficiency/spur cell anemia in setting on decompensated cirrhosis; also concern for MGUS and Gilbert? given unconjugated bilirubin. Was supposed to have an EGD to eval source of bleeding but was deferred due to COVID  - Present with worsening anemia but asymptomatic   - Trend CBC  - f/u haptoglobin/retic/LDH/direct bilirubin  - Currently with brown stool. Low suspicion for acute gib.

## 2020-06-11 NOTE — ED PROVIDER NOTE - ATTENDING CONTRIBUTION TO CARE
63 y/o m with pmhx HTN, HLD, DM, HFpEF with mild LV diastolic dysfunction, duodenal ulcer as well as GAVE and duodenal AVM s/p APC (last on 10/11/19), and decompensated JEAN cirrhosis with prior complications including SBP and hepatic encephalopathy currently on list for liver transplant list presents for episode of confusion this morning. Per patient his wife woke him up at around 8 am and told him he was confused. no trauma / headache / dizziness/ other complaints. Patient admits that he did not know the date/day of the week; later collateral info from wife - patient urinated on the floor. Also has a wound on foot that has been slowly healing. no bleeding / drainage from wound.   Gen.  no acute distress  HEENT:  perrl eomi  Lungs:  b/l bs  CVS: S1S2   Abd;  soft non tender no distention.   Ext: no erythema. right heal ulcer healing. no ttp. no surrounding erythema  Neuro: awake, alert, oriented to person, place. clear speech. appropriate.   MSK: moves all ext spon.

## 2020-06-11 NOTE — CONSULT NOTE ADULT - SUBJECTIVE AND OBJECTIVE BOX
Chief Complaint:  Patient is a 64y old  Male who presents with a chief complaint of Liver failure, hepatic encephalopathy (11 Jun 2020 15:09)      HPI:    The patient is a 64 year old man with JEAN Cirrhosis (listed for transplant), GAVE, history of prostate abscess, MGUS who presented with increased confusion at home. Per patient's wife he did not seem to be himself at home. The patient endorses having 2 bowel movements per day at home.  He does have some urinary incontinence but no dysuria.  His foot wound has been improving.    The patient denies dysphagia, nausea and vomiting, abdominal pain, diarrhea, unintentional weight loss, change in bowel habits or NSAID use.    The patient's pertinent previous imaging results and endoscopic workup are detailed below in the Impression section.     Allergies:  codeine (Anaphylaxis)      Home Medications:    Hospital Medications:  albumin human 25% IVPB 100 milliLiter(s) IV Intermittent every 6 hours  dextrose 40% Gel 15 Gram(s) Oral once PRN  dextrose 5%. 1000 milliLiter(s) IV Continuous <Continuous>  dextrose 50% Injectable 12.5 Gram(s) IV Push once  dextrose 50% Injectable 25 Gram(s) IV Push once  dextrose 50% Injectable 25 Gram(s) IV Push once  folic acid 1 milliGRAM(s) Oral daily  glucagon  Injectable 1 milliGRAM(s) IntraMuscular once PRN  insulin glargine Injectable (LANTUS) 4 Unit(s) SubCutaneous at bedtime  insulin lispro (HumaLOG) corrective regimen sliding scale   SubCutaneous three times a day before meals  insulin lispro (HumaLOG) corrective regimen sliding scale   SubCutaneous at bedtime  insulin lispro Injectable (HumaLOG) 3 Unit(s) SubCutaneous three times a day before meals  lactulose Syrup 20 Gram(s) Oral every 8 hours  midodrine. 20 milliGRAM(s) Oral three times a day  nystatin    Suspension [id number] Unit(s) Oral four times a day  nystatin Cream 1 Application(s) Topical two times a day  pantoprazole    Tablet 40 milliGRAM(s) Oral before breakfast  rifAXIMin 550 milliGRAM(s) Oral two times a day  silver sulfADIAZINE 1% Cream 1 Application(s) Topical two times a day  tamsulosin 0.4 milliGRAM(s) Oral at bedtime  traMADol 25 milliGRAM(s) Oral two times a day PRN  trimethoprim  160 mG/sulfamethoxazole 800 mG 1 Tablet(s) Oral daily      PMHX/PSHX:  GIB (gastrointestinal bleeding)  GERD with esophagitis  Hepatic encephalopathy  Obesity  Fatty liver disease, nonalcoholic  Renal stones  Hypertension  Neuropathy  Hypercholesteremia  Diabetes  S/P cholecystectomy  No significant past surgical history      Family history:  Family history of type 2 diabetes mellitus  Family history of hypertension  Family history of stomach cancer  No pertinent family history in first degree relatives      Social History:   Denies ethanol use.  Denies illicit drug use.    ROS:     General:  No wt loss, fevers, chills, night sweats, fatigue,   Eyes:  Good vision, no reported pain  ENT:  No sore throat, pain, runny nose, dysphagia  CV:  No pain, palpitations, hypo/hypertension  Resp:  No dyspnea, cough, tachypnea, wheezing  GI:  See HPI  :  No pain, bleeding, incontinence, nocturia  Muscle:  No pain, weakness  Neuro:  No weakness, tingling, memory problems  Psych:  No fatigue, insomnia, mood problems, depression  Endocrine:  No polyuria, polydipsia, cold/heat intolerance  Heme:  No petechiae, ecchymosis, easy bruisability  Integumentary:  No rash, edema      PHYSICAL EXAM:     GENERAL:  Appears stated age, well-groomed, well-nourished, no distress  HEENT:  NC/AT,  conjunctivae icteric, clear and pink,   NECK: supple, trachea midline  CHEST:  Full & symmetric excursion, no increased effort, breath sounds clear  HEART:  Regular rhythm, no JVD  ABDOMEN:  Soft, non-tender, non-distended, normoactive bowel sounds,  no masses , no hepatosplenomegaly  EXTREMITIES:  no cyanosis,clubbing or edema  SKIN:  No rash, erythema, or, ecchymoses, no jaundice  NEURO:  Alert, non-focal, very slight asterixis  PSYCH: Appropriate affect, oriented to place and time  RECTAL: Deferred      Vital Signs:  Vital Signs Last 24 Hrs  T(C): 36.3 (11 Jun 2020 14:44), Max: 37.2 (11 Jun 2020 10:20)  T(F): 97.4 (11 Jun 2020 14:44), Max: 98.9 (11 Jun 2020 10:20)  HR: 88 (11 Jun 2020 14:44) (79 - 88)  BP: 122/82 (11 Jun 2020 14:44) (94/52 - 129/68)  BP(mean): --  RR: 19 (11 Jun 2020 14:44) (15 - 19)  SpO2: 100% (11 Jun 2020 14:44) (99% - 100%)  Daily     Daily     LABS:                        7.6    4.24  )-----------( 65       ( 11 Jun 2020 11:33 )             22.9     06-11    130<L>  |  97  |  31<H>  ----------------------------<  222<H>  4.7   |  22  |  1.57<H>    Ca    9.7      11 Jun 2020 11:33  Phos  2.9     06-11  Mg     1.7     06-11    TPro  6.3  /  Alb  3.1<L>  /  TBili  9.2<H>  /  DBili  2.4<H>  /  AST  27  /  ALT  20  /  AlkPhos  191<H>  06-11    LIVER FUNCTIONS - ( 11 Jun 2020 11:33 )  Alb: 3.1 g/dL / Pro: 6.3 g/dL / ALK PHOS: 191 U/L / ALT: 20 U/L / AST: 27 U/L / GGT: x           PT/INR - ( 11 Jun 2020 11:33 )   PT: 23.7 sec;   INR: 2.03 ratio         PTT - ( 11 Jun 2020 11:33 )  PTT:37.5 sec    Amylase Serum--      Lipase serum--       [id number]

## 2020-06-11 NOTE — ED PROVIDER NOTE - OBJECTIVE STATEMENT
64y M PMH HTN, HLD, DM, HFpEF with mild LV diastolic dysfunction, duodenal ulcer as well as GAVE and duodenal AVM s/p APC (last on 10/11/19), and decompensated JEAN cirrhosis with prior complications including SBP and hepatic encephalopathy currently on list for liver transplant presents to the ER with confusion since yesterday. Pt states that yesterday he felt a bit confused because he did not know the date/day of the week; he also had some nausea and one episode of vomiting. Pt states that today he feels better and does not feel confused. Pt also has a wound on his right heel that he has had for at least one month that has increased in size. Pt states his wife wanted him to go to ED because of his confusion. Pt denies any pain at this time. Pt denies any fevers/chills, chest pain, SOB, palpitations, abdominal pain, N/V/D, blood in urine/stool, visual disturbances, HA.

## 2020-06-11 NOTE — CONSULT NOTE ADULT - ASSESSMENT
Impression:     # Acute on chronic hepatic encephalopathy, but on exam in ED appears to be improved.    # Decompensated cirrhosis due to JEAN. MELD Na 30. He is listed for liver transplant.   -ascites: minimal ascites on clinical exam. On lasix/aldactone 40(alternating 20/40) and 100 at home  -hepatic encephalopathy on lactulose and rifaximin, currently AAO x 3  -HCC: not seen on CT from 4/25  -varices: EGD in 12/19 showed small varices and GAVE. Pt not on BB therapy.     # Anemia, drop in hemoglobin to 7.5 from 8.5 without observed GI bleeding, possibly due to known GAVE. B12/folate levels nml (anemia is macrocytic)    # MISA/hyponatremia, likely dehydration related    # Foot ulcer    Recommendation:   -hold home diuretics for now  -100 ml 25% albumin q6h  -official US abdomen  -check UA/urine Na  -low Na diet, if serum sodium not improved by tomorrow can  consider fluid restriction  -Continue home lactulose dose (30 grams TID)  -c/w rifaximin  -c/w midodrine  -trend MELD labs Impression:     # Acute on chronic hepatic encephalopathy, but on exam in ED appears to be improved.    # Decompensated cirrhosis due to JEAN. MELD Na 30. He is listed for liver transplant.   -ascites: minimal ascites on clinical exam. On lasix/aldactone 40(alternating 20/40) and 100 at home  -hepatic encephalopathy on lactulose and rifaximin, currently AAO x 3  -HCC: not seen on CT from 4/25  -varices: EGD in 12/19 showed small varices and GAVE. Pt not on BB therapy.     # Anemia, drop in hemoglobin to 7.5 from 8.5 without observed GI bleeding, possibly due to known GAVE. B12/folate levels nml (anemia is macrocytic)    # MISA/hyponatremia, likely dehydration related    # Foot ulcer    Recommendation:   -consult transplant ID and podiatry  -liver transplant coordinator notified  -hold home diuretics for now  -check UA/urine Na  -low Na diet, if serum sodium not improved by tomorrow can  consider fluid restriction  -Continue home lactulose dose (30 grams TID)  -c/w rifaximin  -c/w midodrine  -trend MELD labs Impression:     # Acute on chronic hepatic encephalopathy, but on exam in ED appears to be improved.    # Decompensated cirrhosis due to JEAN. MELD Na 30. He is listed for liver transplant.   -ascites: minimal ascites on clinical exam. On lasix/aldactone 40(alternating 20/40) and 100 at home  -hepatic encephalopathy on lactulose and rifaximin, currently AAO x 3  -HCC: not seen on CT from 4/25  -varices: EGD in 12/19 showed small varices and GAVE. Pt not on BB therapy.     # Anemia, drop in hemoglobin to 7.5 from 8.5 without observed GI bleeding, possibly due to known GAVE. B12/folate levels nml (anemia is macrocytic)    # MISA/hyponatremia, likely dehydration related    # Foot ulcer    Recommendation:   -consult transplant ID and podiatry  -liver transplant coordinator notified  -hold home diuretics for now  -check UA/urine Na  -low Na diet, if serum sodium not improved by tomorrow can  consider fluid restriction  -Continue home lactulose dose (30 grams TID)  -c/w rifaximin  -c/w midodrine  -trend MELD labs  -obtain PT support to maintain mobility  -glucerna diet supplementation

## 2020-06-11 NOTE — H&P ADULT - HISTORY OF PRESENT ILLNESS
65 yo M PMHx of decompensated JEAN cirrhosis, HTn, HLD, DM, HFpEF (EF 70%) presents with reports of confusion. Saw his hepatologist yesterday was noted to be more jaundiced and more confused than normal. Per wife his was not oriented to time and place, and forgot how many grandchildren he had. He also urinated in his bedroom. He was confused for roughly 12 hours. He is now back to his baseline mental status. He denies fever, chills, dysuria, increased urinary frequency, flank pain, neck stiffness. No melena, hematochezia, hematemesis.

## 2020-06-12 DIAGNOSIS — N39.0 URINARY TRACT INFECTION, SITE NOT SPECIFIED: ICD-10-CM

## 2020-06-12 LAB
A1C WITH ESTIMATED AVERAGE GLUCOSE RESULT: 5.4 % — SIGNIFICANT CHANGE UP (ref 4–5.6)
ALBUMIN SERPL ELPH-MCNC: 3.4 G/DL — SIGNIFICANT CHANGE UP (ref 3.3–5)
ALP SERPL-CCNC: 136 U/L — HIGH (ref 40–120)
ALT FLD-CCNC: 17 U/L — SIGNIFICANT CHANGE UP (ref 10–45)
ANION GAP SERPL CALC-SCNC: 9 MMOL/L — SIGNIFICANT CHANGE UP (ref 5–17)
APTT BLD: 40.5 SEC — HIGH (ref 27.5–36.3)
AST SERPL-CCNC: 24 U/L — SIGNIFICANT CHANGE UP (ref 10–40)
BILIRUB DIRECT SERPL-MCNC: 2.2 MG/DL — HIGH (ref 0–0.2)
BILIRUB SERPL-MCNC: 9.2 MG/DL — HIGH (ref 0.2–1.2)
BUN SERPL-MCNC: 28 MG/DL — HIGH (ref 7–23)
CALCIUM SERPL-MCNC: 9.5 MG/DL — SIGNIFICANT CHANGE UP (ref 8.4–10.5)
CHLORIDE SERPL-SCNC: 99 MMOL/L — SIGNIFICANT CHANGE UP (ref 96–108)
CO2 SERPL-SCNC: 23 MMOL/L — SIGNIFICANT CHANGE UP (ref 22–31)
CREAT SERPL-MCNC: 1.55 MG/DL — HIGH (ref 0.5–1.3)
ESTIMATED AVERAGE GLUCOSE: 108 MG/DL — SIGNIFICANT CHANGE UP (ref 68–114)
FOLATE SERPL-MCNC: >20 NG/ML — SIGNIFICANT CHANGE UP
GLUCOSE SERPL-MCNC: 150 MG/DL — HIGH (ref 70–99)
HAPTOGLOB SERPL-MCNC: <20 MG/DL — LOW (ref 34–200)
HCT VFR BLD CALC: 21.2 % — LOW (ref 39–50)
HGB BLD-MCNC: 7.2 G/DL — LOW (ref 13–17)
INR BLD: 2.18 RATIO — HIGH (ref 0.88–1.16)
IRON SATN MFR SERPL: 160 UG/DL — SIGNIFICANT CHANGE UP (ref 45–165)
IRON SATN MFR SERPL: SIGNIFICANT CHANGE UP % (ref 16–55)
MCHC RBC-ENTMCNC: 34 GM/DL — SIGNIFICANT CHANGE UP (ref 32–36)
MCHC RBC-ENTMCNC: 37.3 PG — HIGH (ref 27–34)
MCV RBC AUTO: 109.8 FL — HIGH (ref 80–100)
NRBC # BLD: 0 /100 WBCS — SIGNIFICANT CHANGE UP (ref 0–0)
PLATELET # BLD AUTO: 59 K/UL — LOW (ref 150–400)
POTASSIUM SERPL-MCNC: 5.1 MMOL/L — SIGNIFICANT CHANGE UP (ref 3.5–5.3)
POTASSIUM SERPL-SCNC: 5.1 MMOL/L — SIGNIFICANT CHANGE UP (ref 3.5–5.3)
PROT SERPL-MCNC: 6.2 G/DL — SIGNIFICANT CHANGE UP (ref 6–8.3)
PROTHROM AB SERPL-ACNC: 25.7 SEC — HIGH (ref 10–12.9)
RBC # BLD: 1.86 M/UL — LOW (ref 4.2–5.8)
RBC # BLD: 1.93 M/UL — LOW (ref 4.2–5.8)
RBC # FLD: 15.6 % — HIGH (ref 10.3–14.5)
RETICS #: 84.1 K/UL — SIGNIFICANT CHANGE UP (ref 25–125)
RETICS/RBC NFR: 4.5 % — HIGH (ref 0.5–2.5)
SODIUM SERPL-SCNC: 131 MMOL/L — LOW (ref 135–145)
TIBC SERPL-MCNC: SIGNIFICANT CHANGE UP UG/DL (ref 220–430)
UIBC SERPL-MCNC: <20 UG/DL — LOW (ref 110–370)
UUN UR-MCNC: 450 MG/DL — SIGNIFICANT CHANGE UP
VIT B12 SERPL-MCNC: 1311 PG/ML — HIGH (ref 232–1245)
WBC # BLD: 3.62 K/UL — LOW (ref 3.8–10.5)
WBC # FLD AUTO: 3.62 K/UL — LOW (ref 3.8–10.5)

## 2020-06-12 PROCEDURE — 99222 1ST HOSP IP/OBS MODERATE 55: CPT | Mod: GC

## 2020-06-12 PROCEDURE — 99233 SBSQ HOSP IP/OBS HIGH 50: CPT

## 2020-06-12 PROCEDURE — 72192 CT PELVIS W/O DYE: CPT | Mod: 26

## 2020-06-12 PROCEDURE — 76700 US EXAM ABDOM COMPLETE: CPT | Mod: 26

## 2020-06-12 RX ORDER — MEROPENEM 1 G/30ML
1000 INJECTION INTRAVENOUS EVERY 12 HOURS
Refills: 0 | Status: DISCONTINUED | OUTPATIENT
Start: 2020-06-13 | End: 2020-06-15

## 2020-06-12 RX ORDER — MEROPENEM 1 G/30ML
1000 INJECTION INTRAVENOUS ONCE
Refills: 0 | Status: COMPLETED | OUTPATIENT
Start: 2020-06-12 | End: 2020-06-12

## 2020-06-12 RX ORDER — MEROPENEM 1 G/30ML
INJECTION INTRAVENOUS
Refills: 0 | Status: DISCONTINUED | OUTPATIENT
Start: 2020-06-12 | End: 2020-06-15

## 2020-06-12 RX ADMIN — Medication 500000 UNIT(S): at 06:28

## 2020-06-12 RX ADMIN — Medication 1 TABLET(S): at 16:15

## 2020-06-12 RX ADMIN — Medication 3 UNIT(S): at 08:34

## 2020-06-12 RX ADMIN — Medication 1 APPLICATION(S): at 17:00

## 2020-06-12 RX ADMIN — MEROPENEM 100 MILLIGRAM(S): 1 INJECTION INTRAVENOUS at 17:13

## 2020-06-12 RX ADMIN — INSULIN GLARGINE 4 UNIT(S): 100 INJECTION, SOLUTION SUBCUTANEOUS at 22:01

## 2020-06-12 RX ADMIN — Medication 500000 UNIT(S): at 12:42

## 2020-06-12 RX ADMIN — LACTULOSE 20 GRAM(S): 10 SOLUTION ORAL at 16:58

## 2020-06-12 RX ADMIN — LACTULOSE 20 GRAM(S): 10 SOLUTION ORAL at 22:01

## 2020-06-12 RX ADMIN — Medication 3 UNIT(S): at 12:42

## 2020-06-12 RX ADMIN — Medication 500000 UNIT(S): at 23:58

## 2020-06-12 RX ADMIN — Medication 1 APPLICATION(S): at 06:29

## 2020-06-12 RX ADMIN — TAMSULOSIN HYDROCHLORIDE 0.4 MILLIGRAM(S): 0.4 CAPSULE ORAL at 22:01

## 2020-06-12 RX ADMIN — Medication 50 MILLILITER(S): at 23:57

## 2020-06-12 RX ADMIN — Medication 50 MILLILITER(S): at 00:13

## 2020-06-12 RX ADMIN — Medication 50 MILLILITER(S): at 06:29

## 2020-06-12 RX ADMIN — Medication 500000 UNIT(S): at 16:59

## 2020-06-12 RX ADMIN — MIDODRINE HYDROCHLORIDE 20 MILLIGRAM(S): 2.5 TABLET ORAL at 12:42

## 2020-06-12 RX ADMIN — Medication 1: at 08:34

## 2020-06-12 RX ADMIN — NYSTATIN CREAM 1 APPLICATION(S): 100000 CREAM TOPICAL at 16:59

## 2020-06-12 RX ADMIN — LACTULOSE 20 GRAM(S): 10 SOLUTION ORAL at 06:28

## 2020-06-12 RX ADMIN — MIDODRINE HYDROCHLORIDE 20 MILLIGRAM(S): 2.5 TABLET ORAL at 06:28

## 2020-06-12 RX ADMIN — NYSTATIN CREAM 1 APPLICATION(S): 100000 CREAM TOPICAL at 06:29

## 2020-06-12 RX ADMIN — Medication 1 MILLIGRAM(S): at 12:42

## 2020-06-12 RX ADMIN — MIDODRINE HYDROCHLORIDE 20 MILLIGRAM(S): 2.5 TABLET ORAL at 16:59

## 2020-06-12 RX ADMIN — PANTOPRAZOLE SODIUM 40 MILLIGRAM(S): 20 TABLET, DELAYED RELEASE ORAL at 06:28

## 2020-06-12 RX ADMIN — Medication 1: at 12:41

## 2020-06-12 NOTE — PROGRESS NOTE ADULT - ATTENDING COMMENTS
updated wife Ada ([phone]) over the phone - spoke to the patient this morning and agrees that his mental status back to baseline.    Cary Rivera MD  Division of Hospital Medicine  Cell: [phone]  Pager: [phone]  Office: [phone]

## 2020-06-12 NOTE — PROGRESS NOTE ADULT - PROBLEM SELECTOR PLAN 5
HFpEF (EF 70%)  Appears euvolemic vs possible hypovolemic.   -hold diuretics in setting of dena, reevaluate fluid status tomorrow and restart when able  -Monitor for volume overload while on albumin.

## 2020-06-12 NOTE — CONSULT NOTE ADULT - SUBJECTIVE AND OBJECTIVE BOX
Podiatry pager #: [phone] ([place])/ [phone] ([facility])    Patient is a 64y old  Male who presents with a chief complaint of Liver failure, hepatic encephalopathy (12 Jun 2020 12:16)      HPI:  65 yo M PMHx of decompensated JEAN cirrhosis, HTn, HLD, DM, HFpEF (EF 70%) presents with reports of confusion. Saw his hepatologist yesterday was noted to be more jaundiced and more confused than normal. Per wife his was not oriented to time and place, and forgot how many grandchildren he had. He also urinated in his bedroom. He was confused for roughly 12 hours. He is now back to his baseline mental status. He denies fever, chills, dysuria, increased urinary frequency, flank pain, neck stiffness. No melena, hematochezia, hematemesis. (11 Jun 2020 13:53)      PAST MEDICAL & SURGICAL HISTORY:  GIB (gastrointestinal bleeding)  GERD with esophagitis: Gastritis &amp; Non Bleeding Ulcers  Hepatic encephalopathy  Obesity  Fatty liver disease, nonalcoholic  Renal stones: 25 years ago  Hypertension  Neuropathy  Hypercholesteremia  Diabetes  S/P cholecystectomy      MEDICATIONS  (STANDING):  albumin human 25% IVPB 100 milliLiter(s) IV Intermittent every 6 hours  dextrose 5%. 1000 milliLiter(s) (50 mL/Hr) IV Continuous <Continuous>  dextrose 50% Injectable 12.5 Gram(s) IV Push once  dextrose 50% Injectable 25 Gram(s) IV Push once  dextrose 50% Injectable 25 Gram(s) IV Push once  folic acid 1 milliGRAM(s) Oral daily  insulin glargine Injectable (LANTUS) 4 Unit(s) SubCutaneous at bedtime  insulin lispro (HumaLOG) corrective regimen sliding scale   SubCutaneous three times a day before meals  insulin lispro (HumaLOG) corrective regimen sliding scale   SubCutaneous at bedtime  insulin lispro Injectable (HumaLOG) 3 Unit(s) SubCutaneous three times a day before meals  lactulose Syrup 20 Gram(s) Oral every 8 hours  meropenem  IVPB      meropenem  IVPB 1000 milliGRAM(s) IV Intermittent once  midodrine. 20 milliGRAM(s) Oral three times a day  nystatin    Suspension [id number] Unit(s) Oral four times a day  nystatin Cream 1 Application(s) Topical two times a day  pantoprazole    Tablet 40 milliGRAM(s) Oral before breakfast  rifAXIMin 550 milliGRAM(s) Oral two times a day  silver sulfADIAZINE 1% Cream 1 Application(s) Topical two times a day  tamsulosin 0.4 milliGRAM(s) Oral at bedtime  trimethoprim  160 mG/sulfamethoxazole 800 mG 1 Tablet(s) Oral daily    MEDICATIONS  (PRN):  dextrose 40% Gel 15 Gram(s) Oral once PRN Blood Glucose LESS THAN 70 milliGRAM(s)/deciliter  glucagon  Injectable 1 milliGRAM(s) IntraMuscular once PRN Glucose LESS THAN 70 milligrams/deciliter  traMADol 25 milliGRAM(s) Oral two times a day PRN Moderate Pain (4 - 6)      Allergies    codeine (Anaphylaxis)    Intolerances        VITALS:    Vital Signs Last 24 Hrs  T(C): 37 (12 Jun 2020 08:32), Max: 37 (12 Jun 2020 08:32)  T(F): 98.6 (12 Jun 2020 08:32), Max: 98.6 (12 Jun 2020 08:32)  HR: 76 (12 Jun 2020 08:32) (76 - 81)  BP: 115/60 (12 Jun 2020 08:32) (104/62 - 115/60)  BP(mean): --  RR: 18 (12 Jun 2020 08:32) (18 - 18)  SpO2: 100% (12 Jun 2020 08:32) (99% - 100%)    LABS:                          7.2    3.62  )-----------( 59       ( 12 Jun 2020 11:12 )             21.2       06-12    131<L>  |  99  |  28<H>  ----------------------------<  150<H>  5.1   |  23  |  1.55<H>    Ca    9.5      12 Jun 2020 11:13  Phos  2.9     06-11  Mg     1.7     06-11    TPro  x   /  Alb  x   /  TBili  x   /  DBili  2.2<H>  /  AST  x   /  ALT  x   /  AlkPhos  x   06-12      CAPILLARY BLOOD GLUCOSE      POCT Blood Glucose.: 187 mg/dL (12 Jun 2020 12:41)  POCT Blood Glucose.: 179 mg/dL (12 Jun 2020 08:29)  POCT Blood Glucose.: 205 mg/dL (11 Jun 2020 21:32)  POCT Blood Glucose.: 215 mg/dL (11 Jun 2020 17:58)      PT/INR - ( 12 Jun 2020 11:13 )   PT: 25.7 sec;   INR: 2.18 ratio         PTT - ( 12 Jun 2020 11:13 )  PTT:40.5 sec    LOWER EXTREMITY PHYSICAL EXAM:    Vascular: DP 2/4 PT 1/4 B/L, CFT <3 seconds B/L, Temperature gradient warm to cool B/L  Neuro: Epicritic sensation diminished to the level of heel B/L  Musculoskeletal/Ortho: no gross deformities  Skin:   Wound #1: right foot  Location: posterior heel  Size: 6 x 4 cm  Depth: subQ  Wound bed: eschar   Drainage: none  Odor: none  Periwound: hyperkeratotic  Etiology: pressure, diabetes    RADIOLOGY & ADDITIONAL STUDIES:  < from: Xray Foot AP + Lateral + Oblique, Right (06.11.20 @ 14:06) >    EXAM:  FOOT COMPLETE RIGHT (MIN 3 VIEW)                            PROCEDURE DATE:  06/11/2020            INTERPRETATION:  Indication: Right foot wound.    Technique: 3 views of the right foot were obtained.    Comparison: 4/24/2020    Findings: Nofracture or dislocation is seen in the right foot. There are hammertoe deformities. No cortical irregularity or erosion is seen to suggest osteomyelitis. There is a plantar calcaneal spur. There is mild diffuse soft tissue swelling. Vascular calcifications are noted.    Impression: No radiographic evidence for osteomyelitis of the right foot.                    JAE OLIVO M.D., ATTENDING RADIOLOGIST  This document has been electronically signed. Jun 11 2020  2:13PM                < end of copied text >

## 2020-06-12 NOTE — CONSULT NOTE ADULT - SUBJECTIVE AND OBJECTIVE BOX
Patient is a 64y old  Male who presents with a chief complaint of Liver failure, hepatic encephalopathy (2020 15:09)    HPI:  63 y/o M PMHx decompensated JEAN Cirrhosis on transplant list, DMII, HFpEF, recent admisson for ESBL E coli prostatic abscess 2/2 4 wks ertapenem, hx of ESBL UTIs, recent VRE urinary colonization came to hospital for worsening jaundice and episode of confusion.    Pt with hospitalization for confusion late april, resolved with empiric ertapenem, Ucx frow VRE treated with macrobid. Pt was discharged on daily Bactrim for suppression of prior ESBL UTIs.    In the hospital pt is afebrile, no leukocytosis, UA with 54 WBCs and many bacteria, Cr 1.57 ( elevated from baseline around 1), Tbili 9.2. CTH unremarkable, RUQ sono with borderline splenomegaly and trace ascites, XR foot without evidence of OM.     prior hospital charts reviewed [  ]  primary team notes reviewed [  ]  other consultant notes reviewed [  ]  PAST MEDICAL & SURGICAL HISTORY:  GIB (gastrointestinal bleeding)  GERD with esophagitis: Gastritis &amp; Non Bleeding Ulcers  Hepatic encephalopathy  Obesity  Fatty liver disease, nonalcoholic  Renal stones: 25 years ago  Hypertension  Neuropathy  Hypercholesteremia  Diabetes  S/P cholecystectomy    Allergies  codeine (Anaphylaxis)    ANTIMICROBIALS (past 90 days)  MEDICATIONS  (STANDING):  nystatin    Suspension   [id number] Unit(s) Oral (20 @ 06:28)   [id number] Unit(s) Oral (20 @ 23:02)   [id number] Unit(s) Oral (20 @ 18:05)    rifAXIMin   550 milliGRAM(s) Oral (20 @ 06:28)   550 milliGRAM(s) Oral (20 @ 18:04)    trimethoprim  160 mG/sulfamethoxazole 800 mG   1 Tablet(s) Oral (20 @ 18:04)      ANTIMICROBIALS:    nystatin    Suspension [id number] four times a day  rifAXIMin 550 two times a day  trimethoprim  160 mG/sulfamethoxazole 800 mG 1 daily    OTHER MEDS: MEDICATIONS  (STANDING):  dextrose 40% Gel 15 once PRN  dextrose 50% Injectable 12.5 once  dextrose 50% Injectable 25 once  dextrose 50% Injectable 25 once  glucagon  Injectable 1 once PRN  insulin glargine Injectable (LANTUS) 4 at bedtime  insulin lispro (HumaLOG) corrective regimen sliding scale  three times a day before meals  insulin lispro (HumaLOG) corrective regimen sliding scale  at bedtime  insulin lispro Injectable (HumaLOG) 3 three times a day before meals  lactulose Syrup 20 every 8 hours  midodrine. 20 three times a day  pantoprazole    Tablet 40 before breakfast  tamsulosin 0.4 at bedtime  traMADol 25 two times a day PRN    SOCIAL HISTORY:   hx smoking  non-smoker    FAMILY HISTORY:  Family history of type 2 diabetes mellitus  Family history of hypertension  Family history of stomach cancer    REVIEW OF SYSTEMS  [  ] ROS unobtainable because:    [  ] All other systems negative except as noted below:	    Constitutional:  [ ] fever [ ] chills  [ ] weight loss  [ ] weakness  Skin:  [ ] rash [ ] phlebitis	  Eyes: [ ] icterus [ ] pain  [ ] discharge	  ENMT: [ ] sore throat  [ ] thrush [ ] ulcers [ ] exudates  Respiratory: [ ] dyspnea [ ] hemoptysis [ ] cough [ ] sputum	  Cardiovascular:  [ ] chest pain [ ] palpitations [ ] edema	  Gastrointestinal:  [ ] nausea [ ] vomiting [ ] diarrhea [ ] constipation [ ] pain	  Genitourinary:  [ ] dysuria [ ] frequency [ ] hematuria [ ] discharge [ ] flank pain  [ ] incontinence  Musculoskeletal:  [ ] myalgias [ ] arthralgias [ ] arthritis  [ ] back pain  Neurological:  [ ] headache [ ] seizures  [ ] confusion/altered mental status  Psychiatric:  [ ] anxiety [ ] depression	  Hematology/Lymphatics:  [ ] lymphadenopathy  Endocrine:  [ ] adrenal [ ] thyroid  Allergic/Immunologic:	 [ ] transplant [ ] seasonal    Vital Signs Last 24 Hrs  T(F): 98.6 (20 @ 08:32), Max: 98.9 (20 @ 10:20)  Vital Signs Last 24 Hrs  HR: 76 (20 @ 08:32) (76 - 88)  BP: 115/60 (20 @ 08:32) (104/62 - 129/68)  RR: 18 (20 @ 08:32)  SpO2: 100% (20 @ 08:32) (99% - 100%)  Wt(kg): --    PHYSICAL EXAM:  Constitutional: non-toxic, no distress  HEAD/EYES: anicteric, no conjunctival injection  ENT:  supple, no thrush  Cardiovascular:   normal S1, S2, no murmur, no edema  Respiratory:  clear BS bilaterally, no wheezes, no rales  GI:  soft, non-tender, normal bowel sounds  :  no bob, no CVA tenderness  Musculoskeletal:  no synovitis, normal ROM  Neurologic: awake and alert, normal strength, no focal findings  Skin:  no rash, no erythema, no phlebitis  Heme/Onc: no lymphadenopathy   Psychiatric:  awake, alert, appropriate mood                            7.2    3.62  )-----------( 59       ( 2020 11:12 )             21.2     06-12    131<L>  |  99  |  28<H>  ----------------------------<  150<H>  5.1   |  23  |  1.55<H>    Ca    9.5      2020 11:13  Phos  2.9     -  Mg     1.7         TPro  6.2  /  Alb  3.4  /  TBili  9.2<H>  /  DBili  x   /  AST  24  /  ALT  17  /  AlkPhos  136<H>  12    Urinalysis Basic - ( 2020 19:57 )    Color: Yellow / Appearance: Clear / S.013 / pH: x  Gluc: x / Ketone: Negative  / Bili: Negative / Urobili: 2 mg/dL   Blood: x / Protein: Negative / Nitrite: Negative   Leuk Esterase: Large / RBC: 4 /hpf / WBC 54 /HPF   Sq Epi: x / Non Sq Epi: 0 /hpf / Bacteria: Many    MICROBIOLOGY:        CMV IgG Antibody: <0.20 U/mL (19 @ 08:33)  Toxoplasma IgG Screen: <3.0 IU/mL (19 @ 08:33)        RADIOLOGY:  imaging below personally reviewed Patient is a 64y old  Male who presents with a chief complaint of Liver failure, hepatic encephalopathy (2020 15:09)    HPI:  65 y/o M PMHx decompensated JEAN Cirrhosis on transplant list, DMII, HFpEF, recent admission for ESBL E coli prostatic abscess 2/2 4 wks ertapenem, hx of ESBL UTIs, recent VRE urinary colonization came to hospital for worsening jaundice and episode of confusion.    Pt with hospitalization for confusion late april, resolved with empiric ertapenem, Ucx frow VRE treated with macrobid. Pt was discharged on daily Bactrim for suppression of prior ESBL UTIs.    Pt does not remember confusion from prior day, events were recounted to him by his wife who told him he was becoming more confused, forgetting how many grandchildren he has, and has an episode of urinary incontinence. Pt denies any preceding or current fevers, chills, cough, SOB, abdominal pain, dysuria, frequency, HA, photophobia, neck stiffness/pain, sick contacts. Still taking his lactulose, no change in baseline BMs. Mildly increased pain at R foot now but no increased drainage he has noticed, attributes pain to wearing his shoes too long on the way over here.    In the hospital pt is afebrile, no leukocytosis, UA with 54 WBCs and many bacteria, Cr 1.57 ( elevated from baseline around 1), Tbili 9.2. CTH unremarkable, RUQ sono with borderline splenomegaly and trace ascites, XR foot without evidence of OM.     prior hospital charts reviewed [x  ]  primary team notes reviewed [ x ]  other consultant notes reviewed [ x ]  PAST MEDICAL & SURGICAL HISTORY:  GIB (gastrointestinal bleeding)  GERD with esophagitis: Gastritis &amp; Non Bleeding Ulcers  Hepatic encephalopathy  Obesity  Fatty liver disease, nonalcoholic  Renal stones: 25 years ago  Hypertension  Neuropathy  Hypercholesteremia  Diabetes  S/P cholecystectomy    Allergies  codeine (Anaphylaxis)    ANTIMICROBIALS (past 90 days)  MEDICATIONS  (STANDING):  nystatin    Suspension   [id number] Unit(s) Oral (20 @ 06:28)   [id number] Unit(s) Oral (20 @ 23:02)   [id number] Unit(s) Oral (20 @ 18:05)    rifAXIMin   550 milliGRAM(s) Oral (20 @ 06:28)   550 milliGRAM(s) Oral (20 @ 18:04)    trimethoprim  160 mG/sulfamethoxazole 800 mG   1 Tablet(s) Oral (20 @ 18:04)      ANTIMICROBIALS:    nystatin    Suspension [id number] four times a day  rifAXIMin 550 two times a day  trimethoprim  160 mG/sulfamethoxazole 800 mG 1 daily    OTHER MEDS: MEDICATIONS  (STANDING):  dextrose 40% Gel 15 once PRN  dextrose 50% Injectable 12.5 once  dextrose 50% Injectable 25 once  dextrose 50% Injectable 25 once  glucagon  Injectable 1 once PRN  insulin glargine Injectable (LANTUS) 4 at bedtime  insulin lispro (HumaLOG) corrective regimen sliding scale  three times a day before meals  insulin lispro (HumaLOG) corrective regimen sliding scale  at bedtime  insulin lispro Injectable (HumaLOG) 3 three times a day before meals  lactulose Syrup 20 every 8 hours  midodrine. 20 three times a day  pantoprazole    Tablet 40 before breakfast  tamsulosin 0.4 at bedtime  traMADol 25 two times a day PRN    SOCIAL HISTORY:  no smoking, alcohol, drug use, lives with wife    FAMILY HISTORY:  Family history of type 2 diabetes mellitus  Family history of hypertension  Family history of stomach cancer    REVIEW OF SYSTEMS  [  ] ROS unobtainable because:    [x  ] All other systems negative except as noted below:	    Constitutional:  [ ] fever [ ] chills  [ ] weight loss  [ ] weakness  Skin:  [ ] rash [ ] phlebitis	  Eyes: [ ] icterus [ ] pain  [ ] discharge	  ENMT: [ ] sore throat  [ ] thrush [ ] ulcers [ ] exudates  Respiratory: [ ] dyspnea [ ] hemoptysis [ ] cough [ ] sputum	  Cardiovascular:  [ ] chest pain [ ] palpitations [ ] edema	  Gastrointestinal:  [ ] nausea [ ] vomiting [ ] diarrhea [ ] constipation [ ] pain	  Genitourinary:  [ ] dysuria [ ] frequency [ ] hematuria [ ] discharge [ ] flank pain  [ ] incontinence  Musculoskeletal:  [ ] myalgias [ ] arthralgias [ ] arthritis  [ ] back pain  Neurological:  [ ] headache [ ] seizures  [ ] confusion/altered mental status  Psychiatric:  [ ] anxiety [ ] depression	  Hematology/Lymphatics:  [ ] lymphadenopathy  Endocrine:  [ ] adrenal [ ] thyroid  Allergic/Immunologic:	 [ ] transplant [ ] seasonal    Vital Signs Last 24 Hrs  T(F): 98.6 (20 @ 08:32), Max: 98.9 (20 @ 10:20)  Vital Signs Last 24 Hrs  HR: 76 (20 @ 08:32) (76 - 88)  BP: 115/60 (20 @ 08:32) (104/62 - 129/68)  RR: 18 (20 @ 08:32)  SpO2: 100% (20 @ 08:32) (99% - 100%)  Wt(kg): --    PHYSICAL EXAM:  Constitutional: non-toxic, no distress, AAO, no confusion  HEAD/EYES: anicteric, no conjunctival injection  ENT:  supple, no thrush  Cardiovascular:   normal S1, S2, no murmur, no edema  Respiratory:  clear BS bilaterally, no wheezes, no rales  GI:  soft, non-tender, normal bowel sounds  :  no bob, no CVA tenderness  Musculoskeletal:  no synovitis, normal ROM  Neurologic: awake and alert, normal strength, no focal findings  Skin:  RLE heel with escar, dry, no signs of surrounding erythema, drainage. No signs of acute SSTI  Heme/Onc: no lymphadenopathy   Psychiatric:  awake, alert, appropriate mood                            7.2    3.62  )-----------( 59       ( 2020 11:12 )             21.2     0612    131<L>  |  99  |  28<H>  ----------------------------<  150<H>  5.1   |  23  |  1.55<H>    Ca    9.5      2020 11:13  Phos  2.9       Mg     1.7         TPro  6.2  /  Alb  3.4  /  TBili  9.2<H>  /  DBili  x   /  AST  24  /  ALT  17  /  AlkPhos  136<H>  06-12    Urinalysis Basic - ( 2020 19:57 )    Color: Yellow / Appearance: Clear / S.013 / pH: x  Gluc: x / Ketone: Negative  / Bili: Negative / Urobili: 2 mg/dL   Blood: x / Protein: Negative / Nitrite: Negative   Leuk Esterase: Large / RBC: 4 /hpf / WBC 54 /HPF   Sq Epi: x / Non Sq Epi: 0 /hpf / Bacteria: Many    MICROBIOLOGY:        CMV IgG Antibody: <0.20 U/mL (19 @ 08:33)  Toxoplasma IgG Screen: <3.0 IU/mL (19 @ 08:33)        RADIOLOGY:  imaging below personally reviewed Patient is a 64y old  Male who presents with a chief complaint of Liver failure, hepatic encephalopathy (2020 15:09)    HPI:  65 y/o M PMHx decompensated JEAN Cirrhosis on transplant list, DMII, HFpEF, recent admission for ESBL E coli prostatic abscess 2/2 4 wks ertapenem, hx of ESBL UTIs, recent VRE urinary colonization came to hospital for worsening jaundice and episode of confusion.    Pt with hospitalization for confusion late april, resolved with empiric ertapenem, Ucx frow VRE treated with macrobid. Pt was discharged on daily Bactrim for suppression of prior ESBL UTIs.    Pt does not remember confusion from prior day, events were recounted to him by his wife who told him he was becoming more confused, forgetting how many grandchildren he has, and has an episode of urinary incontinence. Pt denies any preceding or current fevers, chills, cough, SOB, abdominal pain, dysuria, frequency, HA, photophobia, neck stiffness/pain, sick contacts. Still taking his lactulose, no change in baseline BMs. Mildly increased pain at R foot now but no increased drainage he has noticed, attributes pain to wearing his shoes too long on the way over here.    In the hospital pt is afebrile, no leukocytosis, UA with 54 WBCs and many bacteria, Cr 1.57 ( elevated from baseline around 1), Tbili 9.2. CTH unremarkable, RUQ sono with borderline splenomegaly and trace ascites, XR foot without evidence of OM.     prior hospital charts reviewed [x  ]  primary team notes reviewed [ x ]  other consultant notes reviewed [ x ]  PAST MEDICAL & SURGICAL HISTORY:  GIB (gastrointestinal bleeding)  GERD with esophagitis: Gastritis &amp; Non Bleeding Ulcers  Hepatic encephalopathy  Obesity  Fatty liver disease, nonalcoholic  Renal stones: 25 years ago  Hypertension  Neuropathy  Hypercholesteremia  Diabetes  S/P cholecystectomy    Allergies  codeine (Anaphylaxis)    ANTIMICROBIALS (past 90 days)  MEDICATIONS  (STANDING):  nystatin    Suspension   [id number] Unit(s) Oral (20 @ 06:28)   [id number] Unit(s) Oral (20 @ 23:02)   [id number] Unit(s) Oral (20 @ 18:05)    rifAXIMin   550 milliGRAM(s) Oral (20 @ 06:28)   550 milliGRAM(s) Oral (20 @ 18:04)    trimethoprim  160 mG/sulfamethoxazole 800 mG   1 Tablet(s) Oral (20 @ 18:04)      ANTIMICROBIALS:    nystatin    Suspension [id number] four times a day  rifAXIMin 550 two times a day  trimethoprim  160 mG/sulfamethoxazole 800 mG 1 daily    OTHER MEDS: MEDICATIONS  (STANDING):  dextrose 40% Gel 15 once PRN  dextrose 50% Injectable 12.5 once  dextrose 50% Injectable 25 once  dextrose 50% Injectable 25 once  glucagon  Injectable 1 once PRN  insulin glargine Injectable (LANTUS) 4 at bedtime  insulin lispro (HumaLOG) corrective regimen sliding scale  three times a day before meals  insulin lispro (HumaLOG) corrective regimen sliding scale  at bedtime  insulin lispro Injectable (HumaLOG) 3 three times a day before meals  lactulose Syrup 20 every 8 hours  midodrine. 20 three times a day  pantoprazole    Tablet 40 before breakfast  tamsulosin 0.4 at bedtime  traMADol 25 two times a day PRN    SOCIAL HISTORY:  no smoking, alcohol, drug use, lives with wife    FAMILY HISTORY:  Family history of type 2 diabetes mellitus  Family history of hypertension  Family history of stomach cancer    REVIEW OF SYSTEMS  [  ] ROS unobtainable because:    [x  ] All other systems negative except as noted below:	    Constitutional:  [ ] fever [ ] chills  [ ] weight loss  [ ] weakness  Skin:  [ ] rash [ ] phlebitis	  Eyes: [ ] icterus [ ] pain  [ ] discharge	  ENMT: [ ] sore throat  [ ] thrush [ ] ulcers [ ] exudates  Respiratory: [ ] dyspnea [ ] hemoptysis [ ] cough [ ] sputum	  Cardiovascular:  [ ] chest pain [ ] palpitations [ ] edema	  Gastrointestinal:  [ ] nausea [ ] vomiting [ ] diarrhea [ ] constipation [ ] pain	  Genitourinary:  [ ] dysuria [ ] frequency [ ] hematuria [ ] discharge [ ] flank pain  [ ] incontinence  Musculoskeletal:  [ ] myalgias [ ] arthralgias [ ] arthritis  [ ] back pain  Neurological:  [ ] headache [ ] seizures  [ ] confusion/altered mental status  Psychiatric:  [ ] anxiety [ ] depression	  Hematology/Lymphatics:  [ ] lymphadenopathy  Endocrine:  [ ] adrenal [ ] thyroid  Allergic/Immunologic:	 [ ] transplant [ ] seasonal    Vital Signs Last 24 Hrs  T(F): 98.6 (20 @ 08:32), Max: 98.9 (20 @ 10:20)  Vital Signs Last 24 Hrs  HR: 76 (20 @ 08:32) (76 - 88)  BP: 115/60 (20 @ 08:32) (104/62 - 129/68)  RR: 18 (20 @ 08:32)  SpO2: 100% (20 @ 08:32) (99% - 100%)  Wt(kg): --    PHYSICAL EXAM:  Constitutional: non-toxic, no distress, AAO, no confusion  HEAD/EYES: anicteric, no conjunctival injection  ENT:  supple, no thrush  Cardiovascular:   normal S1, S2, no murmur, no edema  Respiratory:  clear BS bilaterally, no wheezes, no rales  GI:  soft, non-tender, normal bowel sounds  :  no bob, no CVA tenderness  Musculoskeletal:  no synovitis, normal ROM  Neurologic: awake and alert, normal strength, no focal findings  Skin:  RLE heel with escar, dry, no signs of surrounding erythema, drainage. No signs of acute SSTI  Heme/Onc: no lymphadenopathy   Psychiatric:  awake, alert, appropriate mood                            7.2    3.62  )-----------( 59       ( 2020 11:12 )             21.2     06-12    131<L>  |  99  |  28<H>  ----------------------------<  150<H>  5.1   |  23  |  1.55<H>    Ca    9.5      2020 11:13  Phos  2.9     -  Mg     1.7         TPro  6.2  /  Alb  3.4  /  TBili  9.2<H>  /  DBili  x   /  AST  24  /  ALT  17  /  AlkPhos  136<H>  06-12    Urinalysis Basic - ( 2020 19:57 )    Color: Yellow / Appearance: Clear / S.013 / pH: x  Gluc: x / Ketone: Negative  / Bili: Negative / Urobili: 2 mg/dL   Blood: x / Protein: Negative / Nitrite: Negative   Leuk Esterase: Large / RBC: 4 /hpf / WBC 54 /HPF   Sq Epi: x / Non Sq Epi: 0 /hpf / Bacteria: Many    MICROBIOLOGY:    Previous Transplant Labs  TRANSPLANT CLEARANCE LABWORK    Treponema Pallidum Antibody Interpretation: Negative (19 @ 08:33)    Quantiferon TB Plus: NEGATIVE (19 @ 08:38)  Quantiferon TB Plus Nil: 0.16 IU/mL (19 08:38)  Quantiferon TB Plus TB1 minus Nil: 0.00 IU/mL (19 08:38)  Quantiferon TB Plus TB2 minus Nil: 0.01 IU/mL (19 08:38)  Quantiferon TB Plus: NEGATIVE (19 @ 08:51)  Quantiferon TB Plus Nil: 0.03 IU/mL (19 08:51)  Quantiferon TB Plus TB1 minus Nil: 0.00 IU/mL (19 08:51)  Quantiferon TB Plus TB2 minus Nil: 0.00 IU/mL (19 @ 08:51)       EBV VCA IgM EIA: <10.0 U/mL (19 @ 08:33)  EBV VCA IgG EIA: >750.0 U/mL (19 @ 08:33)  EBV EA Ab EIA: <5.0 U/mL (19 @ 08:33)       CMV IgG Antibody: <0.20 U/mL (19 @ 08:33)  CMV IgG Interpretation: Negative (19 @ 08:33)       Herpes simplex 1 IgG Ab Interp: Positive (19 @ 08:33)  Herpes simplex 2 IgG Ab Interp: Negative (19 @ 08:33)     Mumps Antibody Interpretation: Positive (19 @ 10:27)  Measles IgG Interpretation: Positive (19 @ 10:27)  Rubella IgG Interp: Positive (19 @ 08:33)  Varicella IgG Interpretation: Positive (19 @ 08:33)       Hepatitis A IgG Ab Result: Reactive (19 @ 08:33)       Hepatitis B Core Antibody, Total: Nonreact (19 @ 08:33)  Hepatitis B Surface Antibody: Nonreact (19 @ 08:33)  Hepatitis B Surface Antigen: Nonreact (19 @ 08:33)  Hepatitis B Surface AB Quantitative: <3.0 mIU/mL (19 @ 08:33)  Hepatitis B Surface Antigen: Nonreact (19 @ 09:01)  Hepatitis B Core IgM Antibody: Nonreact (19 @ 09:01)  Hepatitis B Surface Antigen: Nonreact (19 @ 00:11)  Hepatitis B Core IgM Antibody: Nonreact (19 @ 00:11)  Hepatitis B Core IgM Antibody: Nonreact (10-23-19 @ 17:56)  Hepatitis B Surface Antigen: Nonreact (10-23-19 @ 17:56)       Hepatitis C RNA, Qualitative: NotDetec (19 @ 08:38)  Hepatitis C RNA Qualitative Interp: HCV RNA Qualitative assay by RT-PCR using [employer] [id number]        Mumps Antibody Interpretation: Positive (19 @ 10:27)  Measles IgG Interpretation: Positive (19 @ 10:27)  Rubella IgG Interp: Positive (19 @ 08:33)              RADIOLOGY:  < from: US Abdomen Complete (20 @ 10:17) >  IMPRESSION:     Limitedexamination.    Cirrhosis. Borderline enlarged spleen. Trace ascites.     Bilateral nonobstructing intrarenal calculi.      < from: Xray Foot AP + Lateral + Oblique, Right (20 @ 14:06) >    Impression: No radiographic evidence for osteomyelitis of the right foot.    < from: CT Head No Cont (20 @ 11:52) >    IMPRESSION: Atrophy and small vessel white matter ischemic changes. No change since 3/10/2020.

## 2020-06-12 NOTE — PROGRESS NOTE ADULT - SUBJECTIVE AND OBJECTIVE BOX
Patient is a 64y old  Male who presents with a chief complaint of Liver failure, hepatic encephalopathy (2020 15:09)      SUBJECTIVE / OVERNIGHT EVENTS:  no acute events overnight, vss, afebrile  pt back to baseline mental status, doesn't recall exactly what happened at home  reports being compliant with lactulose/rifaximin at home with good BMs  currently complains of some right foot pain from the ulcer  denies fever/chills, abdominal pain, nausea/vomiting    ROS:  14 point ROS negative in detail except stated as above    MEDICATIONS  (STANDING):  albumin human 25% IVPB 100 milliLiter(s) IV Intermittent every 6 hours  dextrose 5%. 1000 milliLiter(s) (50 mL/Hr) IV Continuous <Continuous>  dextrose 50% Injectable 12.5 Gram(s) IV Push once  dextrose 50% Injectable 25 Gram(s) IV Push once  dextrose 50% Injectable 25 Gram(s) IV Push once  folic acid 1 milliGRAM(s) Oral daily  insulin glargine Injectable (LANTUS) 4 Unit(s) SubCutaneous at bedtime  insulin lispro (HumaLOG) corrective regimen sliding scale   SubCutaneous three times a day before meals  insulin lispro (HumaLOG) corrective regimen sliding scale   SubCutaneous at bedtime  insulin lispro Injectable (HumaLOG) 3 Unit(s) SubCutaneous three times a day before meals  lactulose Syrup 20 Gram(s) Oral every 8 hours  midodrine. 20 milliGRAM(s) Oral three times a day  nystatin    Suspension [id number] Unit(s) Oral four times a day  nystatin Cream 1 Application(s) Topical two times a day  pantoprazole    Tablet 40 milliGRAM(s) Oral before breakfast  rifAXIMin 550 milliGRAM(s) Oral two times a day  silver sulfADIAZINE 1% Cream 1 Application(s) Topical two times a day  tamsulosin 0.4 milliGRAM(s) Oral at bedtime  trimethoprim  160 mG/sulfamethoxazole 800 mG 1 Tablet(s) Oral daily    MEDICATIONS  (PRN):  dextrose 40% Gel 15 Gram(s) Oral once PRN Blood Glucose LESS THAN 70 milliGRAM(s)/deciliter  glucagon  Injectable 1 milliGRAM(s) IntraMuscular once PRN Glucose LESS THAN 70 milligrams/deciliter  traMADol 25 milliGRAM(s) Oral two times a day PRN Moderate Pain (4 - 6)      CAPILLARY BLOOD GLUCOSE      POCT Blood Glucose.: 179 mg/dL (2020 08:29)  POCT Blood Glucose.: 205 mg/dL (2020 21:32)  POCT Blood Glucose.: 215 mg/dL (2020 17:58)    I&O's Summary    2020 07:01  -  2020 07:00  --------------------------------------------------------  IN: 200 mL / OUT: 0 mL / NET: 200 mL    2020 07:01  -  2020 12:09  --------------------------------------------------------  IN: 0 mL / OUT: 500 mL / NET: -500 mL        PHYSICAL EXAM:  Vital Signs Last 24 Hrs  T(C): 37 (2020 08:32), Max: 37 (2020 08:32)  T(F): 98.6 (2020 08:32), Max: 98.6 (2020 08:32)  HR: 76 (2020 08:32) (76 - 88)  BP: 115/60 (2020 08:32) (104/62 - 129/68)  BP(mean): --  RR: 18 (2020 08:32) (17 - 19)  SpO2: 100% (2020 08:32) (99% - 100%)    GENERAL: NAD, well-developed  HEAD:  Atraumatic, Normocephalic  EYES: EOMI, PERRLA, conjunctiva and sclera clear  NECK: Supple, No JVD  CHEST/LUNG: Clear to auscultation bilaterally; No wheeze  HEART: Regular rate and rhythm; No murmurs, rubs, or gallops  ABDOMEN: Soft, Nontender, Nondistended; Bowel sounds present  EXTREMITIES:  R foot ulcer with dressing, no purulence  NEUROLOGY: AAOx3; non-focal  SKIN: No rashes or lesions    LABS:  personally reviewed                        7.2    3.62  )-----------( 59       ( 2020 11:12 )             21.2     06-12    131<L>  |  99  |  28<H>  ----------------------------<  150<H>  5.1   |  23  |  1.55<H>    Ca    9.5      2020 11:13  Phos  2.9     11  Mg     1.7     -11    TPro  6.2  /  Alb  3.4  /  TBili  9.2<H>  /  DBili  x   /  AST  24  /  ALT  17  /  AlkPhos  136<H>  06-12    PT/INR - ( 2020 11:13 )   PT: 25.7 sec;   INR: 2.18 ratio         PTT - ( 2020 11:13 )  PTT:40.5 sec      Urinalysis Basic - ( 2020 19:57 )    Color: Yellow / Appearance: Clear / S.013 / pH: x  Gluc: x / Ketone: Negative  / Bili: Negative / Urobili: 2 mg/dL   Blood: x / Protein: Negative / Nitrite: Negative   Leuk Esterase: Large / RBC: 4 /hpf / WBC 54 /HPF   Sq Epi: x / Non Sq Epi: 0 /hpf / Bacteria: Many        RADIOLOGY & ADDITIONAL TESTS:    Imaging Personally Reviewed:  -US abdomen  < from: US Abdomen Complete (20 @ 10:17) >  Limited examination secondary to poor acoustic window.  Liver: Coarsened and heterogeneous echotexture in keeping with patient's history of cirrhosis. Right hepatic lobe measures 12.7 cm.  Bile ducts: Normal caliber. Common bile duct measures 6 mm.   Gallbladder: Cholecystectomy.    Pancreas: Visualized portions are within normal limits.  Spleen: Borderline enlarged measuring 13.2 cm in craniocaudal dimension.    Right kidney: 12.7 cm. No hydronephrosis. An intrarenal calculus measuring 0.6 cm.  Left kidney: 14.4 cm.  No hydronephrosis. Several intrarenal calculi measuring up to 0.9 cm in the lower pole.    Ascites: Trace perihepatic.  Aorta and IVC: Atherosclerotic changes of the abdominal aorta.    IMPRESSION:     Limitedexamination.    Cirrhosis. Borderline enlarged spleen. Trace ascites.     Bilateral nonobstructing intrarenal calculi    Consultant(s) Notes Reviewed:  hepatology    Care Discussed with Consultants/Other Providers: Dr. Mai (hepatology) Patient is a 64y old  Male who presents with a chief complaint of Liver failure, hepatic encephalopathy (2020 15:09)      SUBJECTIVE / OVERNIGHT EVENTS:  no acute events overnight, vss, afebrile  pt back to baseline mental status, doesn't recall exactly what happened at home  reports being compliant with lactulose/rifaximin at home with good BMs  currently complains of some right foot pain from the ulcer  denies fever/chills, abdominal pain, nausea/vomiting    ROS:  14 point ROS negative in detail except stated as above    MEDICATIONS  (STANDING):  albumin human 25% IVPB 100 milliLiter(s) IV Intermittent every 6 hours  dextrose 5%. 1000 milliLiter(s) (50 mL/Hr) IV Continuous <Continuous>  dextrose 50% Injectable 12.5 Gram(s) IV Push once  dextrose 50% Injectable 25 Gram(s) IV Push once  dextrose 50% Injectable 25 Gram(s) IV Push once  folic acid 1 milliGRAM(s) Oral daily  insulin glargine Injectable (LANTUS) 4 Unit(s) SubCutaneous at bedtime  insulin lispro (HumaLOG) corrective regimen sliding scale   SubCutaneous three times a day before meals  insulin lispro (HumaLOG) corrective regimen sliding scale   SubCutaneous at bedtime  insulin lispro Injectable (HumaLOG) 3 Unit(s) SubCutaneous three times a day before meals  lactulose Syrup 20 Gram(s) Oral every 8 hours  midodrine. 20 milliGRAM(s) Oral three times a day  nystatin    Suspension [id number] Unit(s) Oral four times a day  nystatin Cream 1 Application(s) Topical two times a day  pantoprazole    Tablet 40 milliGRAM(s) Oral before breakfast  rifAXIMin 550 milliGRAM(s) Oral two times a day  silver sulfADIAZINE 1% Cream 1 Application(s) Topical two times a day  tamsulosin 0.4 milliGRAM(s) Oral at bedtime  trimethoprim  160 mG/sulfamethoxazole 800 mG 1 Tablet(s) Oral daily    MEDICATIONS  (PRN):  dextrose 40% Gel 15 Gram(s) Oral once PRN Blood Glucose LESS THAN 70 milliGRAM(s)/deciliter  glucagon  Injectable 1 milliGRAM(s) IntraMuscular once PRN Glucose LESS THAN 70 milligrams/deciliter  traMADol 25 milliGRAM(s) Oral two times a day PRN Moderate Pain (4 - 6)      CAPILLARY BLOOD GLUCOSE      POCT Blood Glucose.: 179 mg/dL (2020 08:29)  POCT Blood Glucose.: 205 mg/dL (2020 21:32)  POCT Blood Glucose.: 215 mg/dL (2020 17:58)    I&O's Summary    2020 07:01  -  2020 07:00  --------------------------------------------------------  IN: 200 mL / OUT: 0 mL / NET: 200 mL    2020 07:01  -  2020 12:09  --------------------------------------------------------  IN: 0 mL / OUT: 500 mL / NET: -500 mL        PHYSICAL EXAM:  Vital Signs Last 24 Hrs  T(C): 37 (2020 08:32), Max: 37 (2020 08:32)  T(F): 98.6 (2020 08:32), Max: 98.6 (2020 08:32)  HR: 76 (2020 08:32) (76 - 88)  BP: 115/60 (2020 08:32) (104/62 - 129/68)  BP(mean): --  RR: 18 (2020 08:32) (17 - 19)  SpO2: 100% (2020 08:32) (99% - 100%)    GENERAL: NAD, well-developed  HEAD:  Atraumatic, Normocephalic  EYES: EOMI, PERRLA, conjunctiva and sclera clear  NECK: Supple, No JVD  CHEST/LUNG: Clear to auscultation bilaterally; No wheeze  HEART: Regular rate and rhythm; No murmurs, rubs, or gallops  ABDOMEN: Soft, Nontender, Nondistended; Bowel sounds present  EXTREMITIES:  R foot ulcer with dressing, no purulence  NEUROLOGY: AAOx3; non-focal  SKIN: No rashes or lesions    LABS:  personally reviewed                        7.2    3.62  )-----------( 59       ( 2020 11:12 )             21.2     06-12    131<L>  |  99  |  28<H>  ----------------------------<  150<H>  5.1   |  23  |  1.55<H>    Ca    9.5      2020 11:13  Phos  2.9     11  Mg     1.7     -11    TPro  6.2  /  Alb  3.4  /  TBili  9.2<H>  /  DBili  x   /  AST  24  /  ALT  17  /  AlkPhos  136<H>  06-12    PT/INR - ( 2020 11:13 )   PT: 25.7 sec;   INR: 2.18 ratio         PTT - ( 2020 11:13 )  PTT:40.5 sec      Urinalysis Basic - ( 2020 19:57 )    Color: Yellow / Appearance: Clear / S.013 / pH: x  Gluc: x / Ketone: Negative  / Bili: Negative / Urobili: 2 mg/dL   Blood: x / Protein: Negative / Nitrite: Negative   Leuk Esterase: Large / RBC: 4 /hpf / WBC 54 /HPF   Sq Epi: x / Non Sq Epi: 0 /hpf / Bacteria: Many        RADIOLOGY & ADDITIONAL TESTS:    Imaging Personally Reviewed:  -US abdomen  < from: US Abdomen Complete (20 @ 10:17) >  Limited examination secondary to poor acoustic window.  Liver: Coarsened and heterogeneous echotexture in keeping with patient's history of cirrhosis. Right hepatic lobe measures 12.7 cm.  Bile ducts: Normal caliber. Common bile duct measures 6 mm.   Gallbladder: Cholecystectomy.    Pancreas: Visualized portions are within normal limits.  Spleen: Borderline enlarged measuring 13.2 cm in craniocaudal dimension.    Right kidney: 12.7 cm. No hydronephrosis. An intrarenal calculus measuring 0.6 cm.  Left kidney: 14.4 cm.  No hydronephrosis. Several intrarenal calculi measuring up to 0.9 cm in the lower pole.    Ascites: Trace perihepatic.  Aorta and IVC: Atherosclerotic changes of the abdominal aorta.    IMPRESSION:     Limitedexamination.    Cirrhosis. Borderline enlarged spleen. Trace ascites.     Bilateral nonobstructing intrarenal calculi    Consultant(s) Notes Reviewed:  hepatology, ID    Care Discussed with Consultants/Other Providers: Dr. Mai (hepatology), Dr. Menezes

## 2020-06-12 NOTE — CONSULT NOTE ADULT - ASSESSMENT
64M w/ R posterior heel wound, stable  -Pt seen and evaluated  -R posterior heel wound to subQ w/ overlying eschar, stable w/ no signs of infection  -XR negative for gas or OM  -No podiatric surgical intervention  -Recommend local wound care w/ betadine and DSD  -Offload feet at all times while in bed (Z flow boots or pillows underneath legs)  -[facility] up w/ [facility]. Wang [facility] ([address], [address]) within 1 week of discharge. Call [phone] for appointment.  -Discussed w/ attending 64M w/ R posterior heel wound, stable  -Pt seen and evaluated  -R posterior heel wound to subQ w/ overlying eschar, stable w/ no signs of infection  -XR negative for gas or OM  -No podiatric surgical intervention  -Recommend local wound care w/ medihoney and DSD  -Offload feet at all times while in bed (Z flow boots or pillows underneath legs)  -Follow up w/ Dr. Wang at [facility] ([address], [address]) within 1 week of discharge. Call [phone] for appointment.  -Discussed w/ attending

## 2020-06-12 NOTE — CONSULT NOTE ADULT - ATTENDING COMMENTS
Patient seen and examined with Dr. Ortiz    I agree with his history exam and plans as noted above    Patient known to our service from prior admissions for  related infections present ing with worsening encephalopathy  Similar pattern this admission with ???UA with WBCs and bacteria- but no  fevers or chills  Encephalopathy improving on exam and patient able to provide history  Denies urinary complaints, denies abdominal complaints    Follow up blood and urine cultures for organisms sensitivity  agree with Ertapenem pending culture results    Saeed Menezes MD  [phone]  After 5pm/weekends [phone]    Ultrasound of abdomen and renal collecting system- pt. with known small renal calculi

## 2020-06-12 NOTE — CONSULT NOTE ADULT - ASSESSMENT
63 y/o M PMHx decompensated JEAN Cirrhosis on transplant list, DMII, HFpEF, recent admission for ESBL E coli prostatic abscess 2/2 4 wks ertapenem, hx of ESBL UTIs, recent VRE urinary colonization came to hospital for worsening jaundice and episode of confusion, resolved PTA. Pt with history of multiple infections, no clear evidence of active infection at this time.    AMS/Encephalopathy  -Acute episode lasting approximately 12 hours resolved  -Pt is afebrile without leukocytosis, denies any localizing symptoms, no source of infection noted from imaging  -labs with significant pyuria and bacteria. Pt has persistently had positive UA/Ucx on previous visits, appears to be colonized. Pt has been on suppressive bactrim for UTIs since discharge in late april 2020, he is unclear regarding any possible missed doses as his wife handles his medications. Has often been treated but no clear symptoms to indicate  infection at this time. Would send Ucx.  -Would send Bcx  -Prostatic abscess s/p treatment in March 2020 resolved on most recent imaging from April  -Minimal ascites without abdominal pain, do not believe pt has SBP.  -Right foot does not appear infected, Xray without evidence of OM.  -COVID PCR negative 63 y/o M PMHx decompensated JEAN Cirrhosis on transplant list, DMII, HFpEF, recent admission for ESBL E coli prostatic abscess 2/2 4 wks ertapenem, hx of ESBL UTIs, recent VRE urinary colonization came to hospital for worsening jaundice and episode of confusion, resolved PTA. Pt with history of multiple infections, no clear evidence of active infection at this time.    AMS/Encephalopathy  -Acute episode lasting approximately 12 hours resolved  -Pt is afebrile without leukocytosis, denies any localizing symptoms, no source of infection noted from imaging  -labs with significant pyuria and bacteria. Pt has persistently had positive UA/Ucx on previous visits, appears to be colonized. Pt has been on suppressive bactrim for UTIs since discharge in late april 2020, he is unclear regarding any possible missed doses as his wife handles his medications. Has often been treated but no clear symptoms to indicate  infection at this time. Would send Ucx.  -Would send Bcx  -Prostatic abscess s/p treatment in March 2020 resolved on most recent imaging from April  -Minimal ascites without abdominal pain, do not believe pt has SBP.  -Right foot does not appear infected, Xray without evidence of OM.  -COVID PCR negative    Transplant work up  -labs noted as above  -PCV 13 and 23 vaccination completed February 2020  -HepB sab negative in december 2019, no evidence of vaccination afterwards in EMR 63 y/o M PMHx decompensated JEAN Cirrhosis on transplant list, DMII, HFpEF, recent admission for ESBL E coli prostatic abscess 2/2 4 wks ertapenem, hx of ESBL UTIs, recent VRE urinary colonization came to hospital for worsening jaundice and episode of confusion, resolved PTA. Pt with history of multiple infections, no clear evidence of active infection at this time.    AMS/Encephalopathy  -Acute episode lasting approximately 12 hours resolved  -Pt is afebrile without leukocytosis, denies any localizing symptoms, no source of infection noted from imaging  -labs with significant pyuria and bacteria. Pt has persistently had positive UA/Ucx on previous visits, appears to be colonized. Pt has been on suppressive bactrim for UTIs since discharge in late april 2020, he is unclear regarding any possible missed doses as his wife handles his medications. Has often been treated but no clear symptoms to indicate  infection at this time. Would send Ucx.  -Would send Bcx  -Prostatic abscess s/p treatment in March 2020 resolved on most recent imaging from April  -Minimal ascites without abdominal pain, do not believe pt has SBP.  -Right foot does not appear infected, Xray without evidence of OM.  -COVID PCR negative    Overall, not clear that patient has active infection. Could potentially monitor off antibiotics pending culture results. Spoke with primary team, will start Ertapnem 1g daily for now and likely d/c if infectious work up remains negative.    Transplant work up  -labs noted as above  -PCV 13 and 23 vaccination completed February 2020  -HepB sab negative in december 2019, no evidence of vaccination afterwards in EMR. Please re-send Hep B sab, sag, core    Discussed with primary team

## 2020-06-12 NOTE — PROGRESS NOTE ADULT - ASSESSMENT
65 yo M PMHx of decompensated JEAN cirrhosis (on liver transplant list), HTN, HLD, DM, HFpEF (EF 70%) presents with worsening hepatic metabolic encephalopathy found to have UTI 63 yo M PMHx of decompensated JEAN cirrhosis (on liver transplant list), VRE UTI (4/2020) s/p course of ertapenam and on Bactrim ppx, HTN, HLD, DM, HFpEF (EF 70%) presents with worsening hepatic metabolic encephalopathy, noted to have positive UA concerning persistent prostatitis

## 2020-06-12 NOTE — PROGRESS NOTE ADULT - PROBLEM SELECTOR PLAN 2
Reported confusion and altered behavior at home  -Now at baseline mental status, reported compliance with lactulose/rifaximin at home  -c/w suppressive Bactrim   -R heel ulcer is not infected: podiatry consulted  -c/w lactulose 10g Q8, aim for at least 3 BM per day  -c/w rifaximin 550mh PO BID  -Treat MISA as below.   -care discussed with hepatology

## 2020-06-12 NOTE — PROGRESS NOTE ADULT - PROBLEM SELECTOR PLAN 6
Insulin dependent DM  -A1c in april 4.8  -c/w home regimen: Lantus 4units QHS, humalog 3 un qAc  -FSS+ISS

## 2020-06-12 NOTE — PROGRESS NOTE ADULT - PROBLEM SELECTOR PLAN 1
positive UA, pending urine culture result  -hx of VRE UTI (4/2020), s/p course of ertapenem and on UTI ppx bactrim since them  -mental status back to baseline even without abx  -monitor off of abx at this time  -transplant ID consulted  -fu urine culture positive UA, pending urine culture result  -hx of VRE UTI (4/2020), s/p course of ertapenem and on UTI ppx bactrim since them  -mental status back to baseline even without abx  -start meropenem now pending urine culture  -obtain CTAP to rule out prostate abscess (ideally with IV contrast but given MISA)  -fu urine culture  -transplant ID consulted and discussed care

## 2020-06-13 LAB
ALBUMIN SERPL ELPH-MCNC: 3.4 G/DL — SIGNIFICANT CHANGE UP (ref 3.3–5)
ALP SERPL-CCNC: 115 U/L — SIGNIFICANT CHANGE UP (ref 40–120)
ALT FLD-CCNC: 15 U/L — SIGNIFICANT CHANGE UP (ref 10–45)
ANION GAP SERPL CALC-SCNC: 9 MMOL/L — SIGNIFICANT CHANGE UP (ref 5–17)
APTT BLD: 46.2 SEC — HIGH (ref 27.5–36.3)
AST SERPL-CCNC: 21 U/L — SIGNIFICANT CHANGE UP (ref 10–40)
BILIRUB SERPL-MCNC: 9 MG/DL — HIGH (ref 0.2–1.2)
BLD GP AB SCN SERPL QL: NEGATIVE — SIGNIFICANT CHANGE UP
BUN SERPL-MCNC: 27 MG/DL — HIGH (ref 7–23)
CALCIUM SERPL-MCNC: 10 MG/DL — SIGNIFICANT CHANGE UP (ref 8.4–10.5)
CHLORIDE SERPL-SCNC: 102 MMOL/L — SIGNIFICANT CHANGE UP (ref 96–108)
CO2 SERPL-SCNC: 22 MMOL/L — SIGNIFICANT CHANGE UP (ref 22–31)
CREAT SERPL-MCNC: 1.75 MG/DL — HIGH (ref 0.5–1.3)
GLUCOSE SERPL-MCNC: 121 MG/DL — HIGH (ref 70–99)
HBV CORE AB SER-ACNC: SIGNIFICANT CHANGE UP
HBV SURFACE AB SER-ACNC: SIGNIFICANT CHANGE UP
HBV SURFACE AG SER-ACNC: SIGNIFICANT CHANGE UP
HCT VFR BLD CALC: 20.7 % — CRITICAL LOW (ref 39–50)
HGB BLD-MCNC: 6.9 G/DL — CRITICAL LOW (ref 13–17)
INR BLD: 2.27 RATIO — HIGH (ref 0.88–1.16)
LDH SERPL L TO P-CCNC: 164 U/L — SIGNIFICANT CHANGE UP (ref 50–242)
MCHC RBC-ENTMCNC: 33.3 GM/DL — SIGNIFICANT CHANGE UP (ref 32–36)
MCHC RBC-ENTMCNC: 36.7 PG — HIGH (ref 27–34)
MCV RBC AUTO: 110.1 FL — HIGH (ref 80–100)
NRBC # BLD: 0 /100 WBCS — SIGNIFICANT CHANGE UP (ref 0–0)
PLATELET # BLD AUTO: 58 K/UL — LOW (ref 150–400)
POTASSIUM SERPL-MCNC: 5.3 MMOL/L — SIGNIFICANT CHANGE UP (ref 3.5–5.3)
POTASSIUM SERPL-SCNC: 5.3 MMOL/L — SIGNIFICANT CHANGE UP (ref 3.5–5.3)
PROT SERPL-MCNC: 5.8 G/DL — LOW (ref 6–8.3)
PROTHROM AB SERPL-ACNC: 26.6 SEC — HIGH (ref 10–13.1)
RBC # BLD: 1.88 M/UL — LOW (ref 4.2–5.8)
RBC # FLD: 15.2 % — HIGH (ref 10.3–14.5)
RH IG SCN BLD-IMP: POSITIVE — SIGNIFICANT CHANGE UP
SODIUM SERPL-SCNC: 133 MMOL/L — LOW (ref 135–145)
WBC # BLD: 3.57 K/UL — LOW (ref 3.8–10.5)
WBC # FLD AUTO: 3.57 K/UL — LOW (ref 3.8–10.5)

## 2020-06-13 PROCEDURE — 99232 SBSQ HOSP IP/OBS MODERATE 35: CPT | Mod: GC

## 2020-06-13 PROCEDURE — 99233 SBSQ HOSP IP/OBS HIGH 50: CPT

## 2020-06-13 RX ORDER — LACTULOSE 10 G/15ML
30 SOLUTION ORAL EVERY 8 HOURS
Refills: 0 | Status: DISCONTINUED | OUTPATIENT
Start: 2020-06-13 | End: 2020-06-14

## 2020-06-13 RX ADMIN — MIDODRINE HYDROCHLORIDE 20 MILLIGRAM(S): 2.5 TABLET ORAL at 07:02

## 2020-06-13 RX ADMIN — Medication 1 APPLICATION(S): at 07:02

## 2020-06-13 RX ADMIN — LACTULOSE 30 GRAM(S): 10 SOLUTION ORAL at 13:27

## 2020-06-13 RX ADMIN — MIDODRINE HYDROCHLORIDE 20 MILLIGRAM(S): 2.5 TABLET ORAL at 11:22

## 2020-06-13 RX ADMIN — INSULIN GLARGINE 4 UNIT(S): 100 INJECTION, SOLUTION SUBCUTANEOUS at 21:51

## 2020-06-13 RX ADMIN — Medication 500000 UNIT(S): at 07:01

## 2020-06-13 RX ADMIN — Medication 1 TABLET(S): at 11:23

## 2020-06-13 RX ADMIN — LACTULOSE 30 GRAM(S): 10 SOLUTION ORAL at 21:21

## 2020-06-13 RX ADMIN — Medication 500000 UNIT(S): at 16:44

## 2020-06-13 RX ADMIN — Medication 500000 UNIT(S): at 11:22

## 2020-06-13 RX ADMIN — Medication 1 APPLICATION(S): at 16:44

## 2020-06-13 RX ADMIN — TAMSULOSIN HYDROCHLORIDE 0.4 MILLIGRAM(S): 0.4 CAPSULE ORAL at 21:21

## 2020-06-13 RX ADMIN — MEROPENEM 100 MILLIGRAM(S): 1 INJECTION INTRAVENOUS at 07:01

## 2020-06-13 RX ADMIN — NYSTATIN CREAM 1 APPLICATION(S): 100000 CREAM TOPICAL at 07:03

## 2020-06-13 RX ADMIN — Medication 1 MILLIGRAM(S): at 11:22

## 2020-06-13 RX ADMIN — NYSTATIN CREAM 1 APPLICATION(S): 100000 CREAM TOPICAL at 16:44

## 2020-06-13 RX ADMIN — Medication 50 MILLILITER(S): at 08:56

## 2020-06-13 RX ADMIN — MEROPENEM 100 MILLIGRAM(S): 1 INJECTION INTRAVENOUS at 20:46

## 2020-06-13 RX ADMIN — MIDODRINE HYDROCHLORIDE 20 MILLIGRAM(S): 2.5 TABLET ORAL at 16:43

## 2020-06-13 RX ADMIN — LACTULOSE 20 GRAM(S): 10 SOLUTION ORAL at 07:01

## 2020-06-13 RX ADMIN — Medication 500000 UNIT(S): at 23:32

## 2020-06-13 RX ADMIN — Medication 1: at 11:51

## 2020-06-13 RX ADMIN — Medication 3 UNIT(S): at 11:52

## 2020-06-13 RX ADMIN — PANTOPRAZOLE SODIUM 40 MILLIGRAM(S): 20 TABLET, DELAYED RELEASE ORAL at 07:03

## 2020-06-13 NOTE — PROGRESS NOTE ADULT - PROBLEM SELECTOR PLAN 2
Reported confusion and altered behavior at home. Now at baseline mental status, reported compliance with lactulose/rifaximin at home. Lactulose increased today per hepatology.

## 2020-06-13 NOTE — CONSULT NOTE ADULT - SUBJECTIVE AND OBJECTIVE BOX
Chief Complaint:  Patient is a 64y old  Male who presents with a chief complaint of Liver failure, hepatic encephalopathy well known to the gi service  63 yo M PMHx of decompensated JEAN cirrhosis, HTn, HLD, DM, HFpEF (EF 70%) presents with reports of confusion. Saw his hepatologist yesterday was noted to be more jaundiced and more confused than normal. Per wife his was not oriented to time and place, and forgot how many grandchildren he had. He also urinated in his bedroom. He was confused for roughly 12 hours. He is now back to his baseline mental status. He denies fever, chills, dysuria, increased urinary frequency, flank pain, neck stiffness. No melena, hematochezia, hematemesis.      Review of Systems:  Review of Systems: CONSTITUTIONAL: No fever, weight loss, or fatigue  EYES: No eye pain, visual disturbances, or discharge  ENMT: No difficulty hearing, tinnitus, vertigo; No sinus or throat pain  RESPIRATORY: No cough, wheezing, chills or hemoptysis; No shortness of breath  CARDIOVASCULAR: No chest pain, palpitations, dizziness.  GASTROINTESTINAL: No abdominal or epigastric pain. No nausea, vomiting, or hematemesis; No diarrhea or constipation. No melena or hematochezia.  GENITOURINARY: No dysuria, frequency, hematuria, or incontinence  NEUROLOGICAL: + confusion   SKIN: +heel ulcer   LYMPH NODES: No enlarged glands  ENDOCRINE: No heat or cold intolerance; No polydipsia or polyuria  MUSCULOSKELETAL: No joint pain or swelling;   PSYCHIATRIC: Denies depression, anxiety  HEME/LYMPH: No easy bruising, or bleeding gums ALLERGY AND IMMUNOLOGIC: No hives or eczema	  	  Other Review of Systems: All other review of systems negative, except as noted in HPI	  well known to the team.   more confused    Allergies:  codeine (Anaphylaxis)      Medications:  albumin human 25% IVPB 100 milliLiter(s) IV Intermittent every 6 hours  dextrose 40% Gel 15 Gram(s) Oral once PRN  dextrose 5%. 1000 milliLiter(s) IV Continuous <Continuous>  dextrose 50% Injectable 12.5 Gram(s) IV Push once  dextrose 50% Injectable 25 Gram(s) IV Push once  dextrose 50% Injectable 25 Gram(s) IV Push once  folic acid 1 milliGRAM(s) Oral daily  glucagon  Injectable 1 milliGRAM(s) IntraMuscular once PRN  insulin glargine Injectable (LANTUS) 4 Unit(s) SubCutaneous at bedtime  insulin lispro (HumaLOG) corrective regimen sliding scale   SubCutaneous three times a day before meals  insulin lispro (HumaLOG) corrective regimen sliding scale   SubCutaneous at bedtime  insulin lispro Injectable (HumaLOG) 3 Unit(s) SubCutaneous three times a day before meals  lactulose Syrup 20 Gram(s) Oral every 8 hours  meropenem  IVPB      meropenem  IVPB 1000 milliGRAM(s) IV Intermittent every 12 hours  midodrine. 20 milliGRAM(s) Oral three times a day  nystatin    Suspension [id number] Unit(s) Oral four times a day  nystatin Cream 1 Application(s) Topical two times a day  pantoprazole    Tablet 40 milliGRAM(s) Oral before breakfast  rifAXIMin 550 milliGRAM(s) Oral two times a day  silver sulfADIAZINE 1% Cream 1 Application(s) Topical two times a day  tamsulosin 0.4 milliGRAM(s) Oral at bedtime  traMADol 25 milliGRAM(s) Oral two times a day PRN  trimethoprim  160 mG/sulfamethoxazole 800 mG 1 Tablet(s) Oral daily      PMHX/PSHX:  GIB (gastrointestinal bleeding)  GERD with esophagitis  Hepatic encephalopathy  Obesity  Fatty liver disease, nonalcoholic  Renal stones  Hypertension  Neuropathy  Hypercholesteremia  Diabetes  S/P cholecystectomy  No significant past surgical history      Family history:  Family history of type 2 diabetes mellitus  Family history of hypertension  Family history of stomach cancer  No pertinent family history in first degree relatives      Social History:   no etoh no cigsretired   wth children    ROS:     General:  No wt loss, fevers, chills, night sweats, fatigue,   Eyes:  Good vision, no reported pain  ENT:  No sore throat, pain, runny nose, dysphagia  CV:  No pain, palpitations, hypo/hypertension  Resp:  No dyspnea, cough, tachypnea, wheezing  GI:  No pain, No nausea, No vomiting, No diarrhea, No constipation, No weight loss, No fever, No pruritis, No rectal bleeding, No tarry stools, No dysphagia,  :  No pain, bleeding, incontinence, nocturia  Muscle:  No pain, weakness  Neuro:  No weakness, tingling, memory problems  Psych:  No fatigue, insomnia, mood problems, depression  Endocrine:  No polyuria, polydipsia, cold/heat intolerance  Heme:  No petechiae, ecchymosis, easy bruisability  Skin:  No rash, tattoos, scars, edema      PHYSICAL EXAM:   Vital Signs:  Vital Signs Last 24 Hrs  T(C): 36.7 (2020 08:54), Max: 36.7 (2020 16:00)  T(F): 98.1 (2020 08:54), Max: 98.1 (2020 08:54)  HR: 83 (2020 08:54) (72 - 83)  BP: 114/58 (2020 08:54) (94/49 - 114/58)  BP(mean): --  RR: 18 (2020 08:54) (18 - 18)  SpO2: 99% (2020 08:54) (99% - 100%)  Daily     Daily     GENERAL:  Appears stated age, well-groomed, well-nourished, no distress  HEENT:  NC/AT,  conjunctivae clear and pink, no thyromegaly, nodules, adenopathy, no JVD, sclera -anicteric  CHEST:  Full & symmetric excursion, no increased effort, breath sounds clear  HEART:  Regular rhythm, S1, S2, no murmur/rub/S3/S4, no abdominal bruit, no edema  ABDOMEN:  Soft, non-tender, non-distended, normoactive bowel sounds,  no masses ,no hepato-splenomegaly, no signs of chronic liver disease  EXTEREMITIES:  no cyanosis,clubbing or edema  SKIN:  No rash/erythema/ecchymoses/petechiae/wounds/abscess/warm/dry  NEURO:  Alert, oriented, no asterixis, no tremor, no encephalopathy    LABS:                        6.9    3.57  )-----------( 58       ( 2020 07:29 )             20.7     06-13    133<L>  |  102  |  27<H>  ----------------------------<  121<H>  5.3   |  22  |  1.75<H>    Ca    10.0      2020 07:29    TPro  5.8<L>  /  Alb  3.4  /  TBili  9.0<H>  /  DBili  x   /  AST  21  /  ALT  15  /  AlkPhos  115  06-13    LIVER FUNCTIONS - ( 2020 07:29 )  Alb: 3.4 g/dL / Pro: 5.8 g/dL / ALK PHOS: 115 U/L / ALT: 15 U/L / AST: 21 U/L / GGT: x           PT/INR - ( 2020 10:19 )   PT: 26.6 sec;   INR: 2.27 ratio         PTT - ( 2020 10:19 )  PTT:46.2 sec  Urinalysis Basic - ( 2020 19:57 )    Color: Yellow / Appearance: Clear / S.013 / pH: x  Gluc: x / Ketone: Negative  / Bili: Negative / Urobili: 2 mg/dL   Blood: x / Protein: Negative / Nitrite: Negative   Leuk Esterase: Large / RBC: 4 /hpf / WBC 54 /HPF   Sq Epi: x / Non Sq Epi: 0 /hpf / Bacteria: Many          Imaging:

## 2020-06-13 NOTE — PROGRESS NOTE ADULT - PROBLEM SELECTOR PLAN 10
DVT: SCDs alone given elevated inr.   Diet: DASH/TLC/CC  Code: Full   Dispo: Home
DVT: SCDs alone given elevated inr.   Diet: DASH/TLC/CC  Code: Full   Dispo: Home

## 2020-06-13 NOTE — PROGRESS NOTE ADULT - SUBJECTIVE AND OBJECTIVE BOX
Interval Events:   No abdominal pain  No nausea /vomiting / diarrhea   No melena / bloody bm     Hospital Medications:  albumin human 25% IVPB 100 milliLiter(s) IV Intermittent every 6 hours  dextrose 40% Gel 15 Gram(s) Oral once PRN  dextrose 5%. 1000 milliLiter(s) IV Continuous <Continuous>  dextrose 50% Injectable 12.5 Gram(s) IV Push once  dextrose 50% Injectable 25 Gram(s) IV Push once  dextrose 50% Injectable 25 Gram(s) IV Push once  folic acid 1 milliGRAM(s) Oral daily  glucagon  Injectable 1 milliGRAM(s) IntraMuscular once PRN  insulin glargine Injectable (LANTUS) 4 Unit(s) SubCutaneous at bedtime  insulin lispro (HumaLOG) corrective regimen sliding scale   SubCutaneous three times a day before meals  insulin lispro (HumaLOG) corrective regimen sliding scale   SubCutaneous at bedtime  insulin lispro Injectable (HumaLOG) 3 Unit(s) SubCutaneous three times a day before meals  lactulose Syrup 20 Gram(s) Oral every 8 hours  meropenem  IVPB      meropenem  IVPB 1000 milliGRAM(s) IV Intermittent every 12 hours  midodrine. 20 milliGRAM(s) Oral three times a day  nystatin    Suspension [id number] Unit(s) Oral four times a day  nystatin Cream 1 Application(s) Topical two times a day  pantoprazole    Tablet 40 milliGRAM(s) Oral before breakfast  rifAXIMin 550 milliGRAM(s) Oral two times a day  silver sulfADIAZINE 1% Cream 1 Application(s) Topical two times a day  tamsulosin 0.4 milliGRAM(s) Oral at bedtime  traMADol 25 milliGRAM(s) Oral two times a day PRN  trimethoprim  160 mG/sulfamethoxazole 800 mG 1 Tablet(s) Oral daily        ROS:   General:  No fevers, chills or night sweats  ENT:  No sore throat or dysphagia  CV:  No pain or palpitations  Resp:  No dyspnea, cough or  wheezing  GI:  as above  Skin:  No rash or edema  Neuro: no weakness   Hematologic: no bleeding  Musculoskeletal: no muscle pain or join pain  Psych: no agitation      PHYSICAL EXAM:   Vital Signs:  Vital Signs Last 24 Hrs  T(C): 36.4 (2020 00:02), Max: 36.7 (2020 16:00)  T(F): 97.5 (2020 00:02), Max: 98 (2020 16:00)  HR: 78 (2020 00:02) (72 - 78)  BP: 94/49 (2020 00:02) (94/49 - 109/63)  BP(mean): --  RR: 18 (2020 00:02) (18 - 18)  SpO2: 100% (2020 00:02) (100% - 100%)  Daily     Daily     Constitutional: non-toxic, no distress, AAO, no confusion  HEAD/EYES: icteric sclerae   ENT:  supple, no thrush  Cardiovascular:   normal S1, S2, no murmur, no edema  Respiratory:  clear BS bilaterally, no wheezes, no rales  GI:  soft, non-tender, normal bowel sounds  :  no bob, no CVA tenderness  Musculoskeletal:  no synovitis, normal ROM  Neurologic: awake and alert, normal strength, no focal findings  Skin:  RLE heel with escar, dry, no signs of surrounding erythema, drainage.   Heme/Onc: no lymphadenopathy   Psychiatric:  awake, alert, appropriate mood    LABS:                        6.9    3.57  )-----------( 58       ( 2020 07:29 )             20.7     Mean Cell Volume: 110.1 fl (-20 @ 07:29)    06-13    133<L>  |  102  |  27<H>  ----------------------------<  121<H>  5.3   |  22  |  1.75<H>    Ca    10.0      2020 07:29  Phos  2.9     06-11  Mg     1.7     06-11    TPro  5.8<L>  /  Alb  3.4  /  TBili  9.0<H>  /  DBili  x   /  AST  21  /  ALT  15  /  AlkPhos  115  06-13    LIVER FUNCTIONS - ( 2020 07:29 )  Alb: 3.4 g/dL / Pro: 5.8 g/dL / ALK PHOS: 115 U/L / ALT: 15 U/L / AST: 21 U/L / GGT: x           PT/INR - ( 2020 11:13 )   PT: 25.7 sec;   INR: 2.18 ratio         PTT - ( 2020 11:13 )  PTT:40.5 sec  Urinalysis Basic - ( 2020 19:57 )    Color: Yellow / Appearance: Clear / S.013 / pH: x  Gluc: x / Ketone: Negative  / Bili: Negative / Urobili: 2 mg/dL   Blood: x / Protein: Negative / Nitrite: Negative   Leuk Esterase: Large / RBC: 4 /hpf / WBC 54 /HPF   Sq Epi: x / Non Sq Epi: 0 /hpf / Bacteria: Many                              6.9    3.57  )-----------( 58       ( 2020 07:29 )             20.7                         7.2    3.62  )-----------( 59       ( 2020 11:12 )             21.2                         7.6    4.24  )-----------( 65       ( 2020 11:33 )             22.9       Imaging: Interval Events:   No abdominal pain  No nausea /vomiting / diarrhea   No melena / bloody bm     Hospital Medications:  albumin human 25% IVPB 100 milliLiter(s) IV Intermittent every 6 hours  dextrose 40% Gel 15 Gram(s) Oral once PRN  dextrose 5%. 1000 milliLiter(s) IV Continuous <Continuous>  dextrose 50% Injectable 12.5 Gram(s) IV Push once  dextrose 50% Injectable 25 Gram(s) IV Push once  dextrose 50% Injectable 25 Gram(s) IV Push once  folic acid 1 milliGRAM(s) Oral daily  glucagon  Injectable 1 milliGRAM(s) IntraMuscular once PRN  insulin glargine Injectable (LANTUS) 4 Unit(s) SubCutaneous at bedtime  insulin lispro (HumaLOG) corrective regimen sliding scale   SubCutaneous three times a day before meals  insulin lispro (HumaLOG) corrective regimen sliding scale   SubCutaneous at bedtime  insulin lispro Injectable (HumaLOG) 3 Unit(s) SubCutaneous three times a day before meals  lactulose Syrup 20 Gram(s) Oral every 8 hours  meropenem  IVPB      meropenem  IVPB 1000 milliGRAM(s) IV Intermittent every 12 hours  midodrine. 20 milliGRAM(s) Oral three times a day  nystatin    Suspension [id number] Unit(s) Oral four times a day  nystatin Cream 1 Application(s) Topical two times a day  pantoprazole    Tablet 40 milliGRAM(s) Oral before breakfast  rifAXIMin 550 milliGRAM(s) Oral two times a day  silver sulfADIAZINE 1% Cream 1 Application(s) Topical two times a day  tamsulosin 0.4 milliGRAM(s) Oral at bedtime  traMADol 25 milliGRAM(s) Oral two times a day PRN  trimethoprim  160 mG/sulfamethoxazole 800 mG 1 Tablet(s) Oral daily        ROS:   General:  No fevers, chills or night sweats  ENT:  No sore throat or dysphagia  CV:  No pain or palpitations  Resp:  No dyspnea, cough or  wheezing  GI:  as above  Skin:  No rash or edema  Neuro: no weakness   Hematologic: no bleeding  Musculoskeletal: no muscle pain or join pain  Psych: no agitation      PHYSICAL EXAM:   Vital Signs:  Vital Signs Last 24 Hrs  T(C): 36.4 (2020 00:02), Max: 36.7 (2020 16:00)  T(F): 97.5 (2020 00:02), Max: 98 (2020 16:00)  HR: 78 (2020 00:02) (72 - 78)  BP: 94/49 (2020 00:02) (94/49 - 109/63)  BP(mean): --  RR: 18 (2020 00:02) (18 - 18)  SpO2: 100% (2020 00:02) (100% - 100%)  Daily     Daily     Constitutional: non-toxic, no distress, AAO, no confusion  HEAD/EYES: icteric sclerae   ENT:  supple, no thrush  Cardiovascular:   normal S1, S2, no murmur, no edema  Respiratory:  clear BS bilaterally, no wheezes, no rales  GI:  soft, non-tender, normal bowel sounds  :  no bob, no CVA tenderness  Musculoskeletal:  no synovitis, normal ROM  Neurologic: + asterixis, awake and alert, normal strength, no focal findings  Skin:  RLE heel with escar, dry, no signs of surrounding erythema, drainage.   Heme/Onc: no lymphadenopathy   Psychiatric:  awake, alert, appropriate mood    LABS:                        6.9    3.57  )-----------( 58       ( 2020 07:29 )             20.7     Mean Cell Volume: 110.1 fl (-20 @ 07:29)    06-13    133<L>  |  102  |  27<H>  ----------------------------<  121<H>  5.3   |  22  |  1.75<H>    Ca    10.0      2020 07:29  Phos  2.9     06-11  Mg     1.7     06-11    TPro  5.8<L>  /  Alb  3.4  /  TBili  9.0<H>  /  DBili  x   /  AST  21  /  ALT  15  /  AlkPhos  115  06-13    LIVER FUNCTIONS - ( 2020 07:29 )  Alb: 3.4 g/dL / Pro: 5.8 g/dL / ALK PHOS: 115 U/L / ALT: 15 U/L / AST: 21 U/L / GGT: x           PT/INR - ( 2020 11:13 )   PT: 25.7 sec;   INR: 2.18 ratio         PTT - ( 2020 11:13 )  PTT:40.5 sec  Urinalysis Basic - ( 2020 19:57 )    Color: Yellow / Appearance: Clear / S.013 / pH: x  Gluc: x / Ketone: Negative  / Bili: Negative / Urobili: 2 mg/dL   Blood: x / Protein: Negative / Nitrite: Negative   Leuk Esterase: Large / RBC: 4 /hpf / WBC 54 /HPF   Sq Epi: x / Non Sq Epi: 0 /hpf / Bacteria: Many                              6.9    3.57  )-----------( 58       ( 2020 07:29 )             20.7                         7.2    3.62  )-----------( 59       ( 2020 11:12 )             21.2                         7.6    4.24  )-----------( 65       ( 2020 11:33 )             22.9       Imaging:

## 2020-06-13 NOTE — CONSULT NOTE ADULT - ASSESSMENT
encephalopathy  anemia  gi bleed  cirrhosis      plan  check coags  continue rifaxamin 550 bid  lactulose 30 bid   check ammonia  ? transplant team  hepatology on the case  ppi once a day   occult to be done

## 2020-06-13 NOTE — PROGRESS NOTE ADULT - ASSESSMENT
64 M hx of DM, HLD, GERD, HFpEF with mild LV diastolic dysfunction, and decompensated JEAN cirrhosis c/b ascites with hx of SBP, HE, duodenal ulcer, GAVE and duodenal AVM s/p APC and recent admission for ESBL E coli prostatic abscess 2/2 4 wks ertapenem, hx of ESBL UTIs, recent VRE urinary colonization came to hospital for worsening jaundice and episode of confusion, resolved PTA.   Acute episode lasting approximately 12 hours resolved. Pt is afebrile without leukocytosis, denies any localizing symptoms, no source of infection noted from imaging  -labs with significant pyuria and bacteria. Pt has persistently had positive UA/Ucx on previous visits, appears to be colonized.   Pt has been on suppressive bactrim for UTIs since discharge in late april 2020,   -Prostatic abscess s/p treatment in March 2020 resolved on most recent imaging from April      Impression:   # Acute on chronic hepatic encephalopathy, but on exam in ED appears to be improved.  # Decompensated cirrhosis due to JEAN. MELD Na 30. He is listed for liver transplant.   -ascites: minimal ascites on clinical exam. On lasix/aldactone 40(alternating 20/40) and 100 at home  -hepatic encephalopathy on lactulose and rifaximin, currently AAO x 3  -HCC: not seen on CT from 4/25  -varices: EGD in 12/19 showed small varices and GAVE. Pt not on BB therapy.     # Anemia, drop in hemoglobin to 7.5 from 8.5 without observed GI bleeding, possibly due to known GAVE. B12/folate levels nml (anemia is macrocytic)    # MISA/ hyponatremia, likely dehydration related    # R posterior heel wound to subQ w/ overlying eschar, stable w/ no signs of infection -XR negative for gas or OM ( evaluated by podiatry)     Recommendation:   - Continue to hold home diuretics for now  -check UA/urine Na  -low Na diet, if serum sodium not improved by tomorrow can  consider fluid restriction  -Continue home lactulose dose (30 grams TID)  -c/w rifaximin  -c/w midodrine  -trend MELD labs  -obtain PT support to maintain mobility  -glucerna diet supplementation 64 M hx of DM, HLD, GERD, HFpEF with mild LV diastolic dysfunction, and decompensated JEAN cirrhosis c/b ascites with hx of SBP, HE, duodenal ulcer, GAVE and duodenal AVM s/p APC and recent admission for ESBL E coli prostatic abscess 2/2 4 wks ertapenem, hx of ESBL UTIs, recent VRE urinary colonization came to hospital for worsening jaundice and episode of confusion, resolved PTA.   Acute episode lasting approximately 12 hours resolved. Pt is afebrile without leukocytosis, denies any localizing symptoms, no source of infection noted from imaging  -labs with significant pyuria and bacteria. Pt has persistently had positive UA/Ucx on previous visits, appears to be colonized.   Pt has been on suppressive bactrim for UTIs since discharge in late april 2020      Impression:   # Anemia, drop in hemoglobin to 6.9 (7.5 on admission from 8.5 baseline on 05/2020) without observed GI bleeding, possibly due to known GAVE. B12/folate levels nml (anemia is macrocytic)  # MISA: FENa: 2.2 DDx: ATN, HRS, ( s/p 2 days of IV albumin still rising )  # Recurrent UTIs on bactrim   # Acute on chronic hepatic encephalopathy, but on exam in ED appears to be improved.  # Decompensated cirrhosis due to JEAN. MELD Na 30. He is listed for liver transplant.   -ascites: minimal ascites on clinical exam. On lasix/aldactone 40(alternating 20/40) and 100 at home  -hepatic encephalopathy on lactulose and rifaximin, currently AAO x 3  -HCC: not seen on CT from 4/25  -varices: EGD in 12/19 showed small varices and GAVE. Pt not on BB therapy.     # R posterior heel wound to subQ w/ overlying eschar, stable w/ no signs of infection -XR negative for gas or OM ( evaluated by podiatry)   # h/o prostatic abscess s/p treatment in March 2020 resolved on most recent imaging from April    Recommendation:   - Transfuse 1 unit of PRBC.  - Continue to monitor h&h and transfuse if hgb drop below 7  - IV PPI 40 mg bid for now  - Continue to hold home diuretics for now  - Consult transplant nephrology given worsening cr)  - Continue Abx as per ID  - low Na diet  - Continue home lactulose dose (30 grams TID)  - c/w rifaximin  - c/w midodrine  - trend MELD labs 64 M hx of DM, HLD, GERD, HFpEF with mild LV diastolic dysfunction, and decompensated JEAN cirrhosis c/b ascites with hx of SBP, HE, duodenal ulcer, GAVE and duodenal AVM s/p APC and recent admission for ESBL E coli prostatic abscess 2/2 4 wks ertapenem, hx of ESBL UTIs, recent VRE urinary colonization came to hospital for worsening jaundice and episode of confusion, resolved PTA.   Acute episode lasting approximately 12 hours resolved. Pt is afebrile without leukocytosis, denies any localizing symptoms, no source of infection noted from imaging  -labs with significant pyuria and bacteria. Pt has persistently had positive UA/Ucx on previous visits, appears to be colonized.   Pt has been on suppressive bactrim for UTIs since discharge in late april 2020      Impression:   # Anemia, drop in hemoglobin to 6.9 (7.5 on admission from 8.5 baseline on 05/2020) without observed GI bleeding, possibly due to known GAVE. B12/folate levels nml (anemia is macrocytic)  # MISA: FENa: 2.2 DDx: ATN, ( s/p 2 days of IV albumin still rising ) less likely HRS or pre renal   # Recurrent UTIs on bactrim   # Acute on chronic hepatic encephalopathy, + asterixis on exam   # Decompensated cirrhosis due to JEAN. MELD Na 30. He is listed for liver transplant.   -ascites: minimal ascites on clinical exam. On lasix/aldactone 40(alternating 20/40) and 100 at home  -hepatic encephalopathy on lactulose and rifaximin, currently AAO x 3  -HCC: not seen on CT from 4/25  -varices: EGD in 12/19 showed small varices and GAVE. Pt not on BB therapy.     # R posterior heel wound to subQ w/ overlying eschar, stable w/ no signs of infection -XR negative for gas or OM ( evaluated by podiatry)   # h/o prostatic abscess s/p treatment in March 2020 resolved on most recent imaging from April    Recommendation:   - Change lactulose dose to 30 grams TID ( home dose )  - Discontinue IV albumin  - Consult transplant nephrology given worsening cr)  - Transfuse 1 unit of PRBC.  - Continue to monitor h&h and transfuse if hgb drop below 7  - IV PPI 40 mg bid for now  - Continue to hold home diuretics for now  - Continue Abx as per ID  - low Na diet  - c/w rifaximin  - c/w midodrine  - trend MELD labs 64 M hx of DM, HLD, GERD, HFpEF with mild LV diastolic dysfunction, and decompensated JEAN cirrhosis c/b ascites with hx of SBP, HE, duodenal ulcer, GAVE and duodenal AVM s/p APC and recent admission for ESBL E coli prostatic abscess 2/2 4 wks ertapenem, hx of ESBL UTIs, recent VRE urinary colonization came to hospital for worsening jaundice and episode of confusion, resolved PTA.   Acute episode lasting approximately 12 hours resolved. Pt is afebrile without leukocytosis, denies any localizing symptoms, no source of infection noted from imaging  -labs with significant pyuria and bacteria. Pt has persistently had positive UA/Ucx on previous visits, appears to be colonized.   Pt has been on suppressive bactrim for UTIs since discharge in late april 2020      Impression:   # Anemia, drop in hemoglobin to 6.9 (7.5 on admission from 8.5 baseline on 05/2020) without observed GI bleeding, possibly due to known GAVE. B12/folate levels nml (anemia is macrocytic)  # MISA: FENa: 2.2 DDx: ATN, ( s/p 2 days of IV albumin still rising ) less likely HRS or pre renal   # Recurrent UTIs on bactrim   # Acute on chronic hepatic encephalopathy, + asterixis on exam   # Decompensated cirrhosis due to JEAN. MELD Na 30. He is listed for liver transplant.   -ascites: minimal ascites on clinical exam. On lasix/aldactone 40(alternating 20/40) and 100 at home  -hepatic encephalopathy on lactulose and rifaximin, currently AAO x 3  -HCC: not seen on CT from 4/25  -varices: EGD in 12/19 showed small varices and GAVE. Pt not on BB therapy.     # R posterior heel wound to subQ w/ overlying eschar, stable w/ no signs of infection -XR negative for gas or OM ( evaluated by podiatry)   # h/o prostatic abscess s/p treatment in March 2020 resolved on most recent imaging from April    Recommendation:   - Increase lactulose dose to 30 grams TID ( home dose )  - Discontinue IV albumin  - Consult transplant nephrology given worsening cr)  - Transfuse 1 unit of PRBC.  - Continue to monitor h&h and transfuse if hgb drop below 7  - IV PPI 40 mg bid for now  - Continue to hold home diuretics for now  - Continue Abx as per ID  - low Na diet  - c/w rifaximin  - c/w midodrine  - trend MELD labs 64 M hx of DM, HLD, GERD, HFpEF with mild LV diastolic dysfunction, and decompensated JEAN cirrhosis c/b ascites with hx of SBP, HE, duodenal ulcer, GAVE and duodenal AVM s/p APC and recent admission for ESBL E coli prostatic abscess 2/2 4 wks ertapenem, hx of ESBL UTIs, recent VRE urinary colonization came to hospital for worsening jaundice and episode of confusion, resolved PTA.   Acute episode lasting approximately 12 hours resolved. Pt is afebrile without leukocytosis, denies any localizing symptoms, no source of infection noted from imaging  -labs with significant pyuria and bacteria. Pt has persistently had positive UA/Ucx on previous visits, appears to be colonized.   Pt has been on suppressive bactrim for UTIs since discharge in late april 2020      Impression:   # Anemia, drop in hemoglobin to 6.9 (7.5 on admission from 8.5 baseline on 05/2020) without observed GI bleeding, possibly due to known GAVE. B12/folate levels nml (anemia is macrocytic)  # MISA: FENa: 2.2 DDx: ATN, ( s/p 2 days of IV albumin still rising ) less likely HRS or pre renal   # Recurrent UTIs on bactrim   # Acute on chronic hepatic encephalopathy, + asterixis on exam   # Decompensated cirrhosis due to JEAN. MELD Na 30. He is listed for liver transplant.   -ascites: minimal ascites on clinical exam. On lasix/aldactone 40(alternating 20/40) and 100 at home  -hepatic encephalopathy on lactulose and rifaximin, currently AAO x 3  -HCC: not seen on CT from 4/25  -varices: EGD in 12/19 showed small varices and GAVE. Pt not on BB therapy.     # R posterior heel wound to subQ w/ overlying eschar, stable w/ no signs of infection -XR negative for gas or OM ( evaluated by podiatry)   # h/o prostatic abscess s/p treatment in March 2020 resolved on most recent imaging from April    Recommendation:   - Increase lactulose dose to 30 grams TID ( home dose )  - Discontinue IV albumin  - Consult nephrology given worsening cr)  - Continue to monitor h&h and transfuse if hgb drop below 7  - IV PPI 40 mg bid for now  - Continue to hold home diuretics for now  - Continue Abx as per ID  - low Na diet  - c/w rifaximin  - c/w midodrine  - trend MELD labs

## 2020-06-13 NOTE — PROGRESS NOTE ADULT - ASSESSMENT
Patient returned to his room. VSS. Patient stated his pain was tolerable at the moment. HORACE drain placed to left buttock. Patient alert and oriented x 4 but slightly groggy from sedation/medication given in procedure. Drain has minimal bloody drainage at this time. Will continue to monitor closely for any changes in condition.   63 yo M PMHx of decompensated JEAN cirrhosis (on liver transplant list), VRE UTI (4/2020) s/p course of ertapenam and on Bactrim ppx, HTN, DM, HFpEF (EF 70%) presents with worsening hepatic metabolic encephalopathy, noted to have positive UA.

## 2020-06-13 NOTE — PROGRESS NOTE ADULT - PROBLEM SELECTOR PLAN 1
No symptoms, possible chronic colonization. Has been on Meropenem, culture not sent yet- discussed w nurse.

## 2020-06-13 NOTE — PROGRESS NOTE ADULT - ENMT
Photo therapy to begin; mom is breastfeeding at this time; explained infant needs to be under lights with just diaper and eye protection as much as possible-only out for feedings and diaper changes. Parents verbalized understanding. 1330-In room to round and noticed mom holding infant Ana Box was crying under the lights so I picked her up to try to feed again\". Re-explained need for infant to be under lights. Mom verbalized understanding infant replaced under lights. Will continue to monitor. No oral lesions; no gross abnormalities

## 2020-06-13 NOTE — PROGRESS NOTE ADULT - PROBLEM SELECTOR PLAN 3
Decompensated cirrhosis due to JEAN, MELD-Na 30   -pt on liver transplant list  -EGD in 12/19 showed small varices and GAVE  -POCUS in ED with no pocket amenable to safely perform paracentesis

## 2020-06-13 NOTE — PROGRESS NOTE ADULT - SUBJECTIVE AND OBJECTIVE BOX
Patient is a 64y old  Male who presents with a chief complaint of Liver failure, hepatic encephalopathy (13 Jun 2020 13:13)    HPI: Pt in bed. Awake, slow to respond. Reports feeling better.    Vital Signs Last 24 Hrs  T(C): 36.7 (13 Jun 2020 08:54), Max: 36.7 (12 Jun 2020 16:00)  T(F): 98.1 (13 Jun 2020 08:54), Max: 98.1 (13 Jun 2020 08:54)  HR: 83 (13 Jun 2020 08:54) (72 - 83)  BP: 114/58 (13 Jun 2020 08:54) (94/49 - 114/58)  BP(mean): --  RR: 18 (13 Jun 2020 08:54) (18 - 18)  SpO2: 99% (13 Jun 2020 08:54) (99% - 100%)                          6.9    3.57  )-----------( 58       ( 13 Jun 2020 07:29 )             20.7     06-13    133<L>  |  102  |  27<H>  ----------------------------<  121<H>  5.3   |  22  |  1.75<H>    Ca    10.0      13 Jun 2020 07:29    TPro  5.8<L>  /  Alb  3.4  /  TBili  9.0<H>  /  DBili  x   /  AST  21  /  ALT  15  /  AlkPhos  115  06-13    MEDICATIONS  (STANDING):  dextrose 5%. 1000 milliLiter(s) (50 mL/Hr) IV Continuous <Continuous>  dextrose 50% Injectable 12.5 Gram(s) IV Push once  dextrose 50% Injectable 25 Gram(s) IV Push once  dextrose 50% Injectable 25 Gram(s) IV Push once  folic acid 1 milliGRAM(s) Oral daily  insulin glargine Injectable (LANTUS) 4 Unit(s) SubCutaneous at bedtime  insulin lispro (HumaLOG) corrective regimen sliding scale   SubCutaneous three times a day before meals  insulin lispro (HumaLOG) corrective regimen sliding scale   SubCutaneous at bedtime  insulin lispro Injectable (HumaLOG) 3 Unit(s) SubCutaneous three times a day before meals  lactulose Syrup 30 Gram(s) Oral every 8 hours  meropenem  IVPB      meropenem  IVPB 1000 milliGRAM(s) IV Intermittent every 12 hours  midodrine. 20 milliGRAM(s) Oral three times a day  nystatin    Suspension 932288 Unit(s) Oral four times a day  nystatin Cream 1 Application(s) Topical two times a day  pantoprazole    Tablet 40 milliGRAM(s) Oral before breakfast  rifAXIMin 550 milliGRAM(s) Oral two times a day  silver sulfADIAZINE 1% Cream 1 Application(s) Topical two times a day  tamsulosin 0.4 milliGRAM(s) Oral at bedtime  trimethoprim  160 mG/sulfamethoxazole 800 mG 1 Tablet(s) Oral daily    MEDICATIONS  (PRN):  dextrose 40% Gel 15 Gram(s) Oral once PRN Blood Glucose LESS THAN 70 milliGRAM(s)/deciliter  glucagon  Injectable 1 milliGRAM(s) IntraMuscular once PRN Glucose LESS THAN 70 milligrams/deciliter  traMADol 25 milliGRAM(s) Oral two times a day PRN Moderate Pain (4 - 6)

## 2020-06-14 DIAGNOSIS — L97.409 NON-PRESSURE CHRONIC ULCER OF UNSPECIFIED HEEL AND MIDFOOT WITH UNSPECIFIED SEVERITY: ICD-10-CM

## 2020-06-14 LAB
A1C WITH ESTIMATED AVERAGE GLUCOSE RESULT: 5.2 % — SIGNIFICANT CHANGE UP (ref 4–5.6)
ALBUMIN SERPL ELPH-MCNC: 3.8 G/DL — SIGNIFICANT CHANGE UP (ref 3.3–5)
ALP SERPL-CCNC: 133 U/L — HIGH (ref 40–120)
ALT FLD-CCNC: 15 U/L — SIGNIFICANT CHANGE UP (ref 10–45)
AMMONIA BLD-MCNC: 127 UMOL/L — HIGH (ref 11–55)
ANION GAP SERPL CALC-SCNC: 11 MMOL/L — SIGNIFICANT CHANGE UP (ref 5–17)
APTT BLD: 44.2 SEC — HIGH (ref 27.5–36.3)
AST SERPL-CCNC: 24 U/L — SIGNIFICANT CHANGE UP (ref 10–40)
BILIRUB SERPL-MCNC: 11 MG/DL — HIGH (ref 0.2–1.2)
BUN SERPL-MCNC: 27 MG/DL — HIGH (ref 7–23)
CALCIUM SERPL-MCNC: 10.5 MG/DL — SIGNIFICANT CHANGE UP (ref 8.4–10.5)
CHLORIDE SERPL-SCNC: 100 MMOL/L — SIGNIFICANT CHANGE UP (ref 96–108)
CO2 SERPL-SCNC: 20 MMOL/L — LOW (ref 22–31)
CREAT SERPL-MCNC: 1.74 MG/DL — HIGH (ref 0.5–1.3)
ESTIMATED AVERAGE GLUCOSE: 103 MG/DL — SIGNIFICANT CHANGE UP (ref 68–114)
GLUCOSE SERPL-MCNC: 141 MG/DL — HIGH (ref 70–99)
HCT VFR BLD CALC: 26.4 % — LOW (ref 39–50)
HGB BLD-MCNC: 9 G/DL — LOW (ref 13–17)
INR BLD: 2.23 RATIO — HIGH (ref 0.88–1.16)
MCHC RBC-ENTMCNC: 34.1 GM/DL — SIGNIFICANT CHANGE UP (ref 32–36)
MCHC RBC-ENTMCNC: 36.3 PG — HIGH (ref 27–34)
MCV RBC AUTO: 106.5 FL — HIGH (ref 80–100)
NRBC # BLD: 0 /100 WBCS — SIGNIFICANT CHANGE UP (ref 0–0)
PLATELET # BLD AUTO: 68 K/UL — LOW (ref 150–400)
POTASSIUM SERPL-MCNC: 5.1 MMOL/L — SIGNIFICANT CHANGE UP (ref 3.5–5.3)
POTASSIUM SERPL-SCNC: 5.1 MMOL/L — SIGNIFICANT CHANGE UP (ref 3.5–5.3)
PROT SERPL-MCNC: 6.3 G/DL — SIGNIFICANT CHANGE UP (ref 6–8.3)
PROTHROM AB SERPL-ACNC: 26.3 SEC — HIGH (ref 10–13.1)
RBC # BLD: 2.48 M/UL — LOW (ref 4.2–5.8)
RBC # FLD: 18.2 % — HIGH (ref 10.3–14.5)
SODIUM SERPL-SCNC: 131 MMOL/L — LOW (ref 135–145)
SODIUM UR-SCNC: 92 MMOL/L — SIGNIFICANT CHANGE UP
WBC # BLD: 5.07 K/UL — SIGNIFICANT CHANGE UP (ref 3.8–10.5)
WBC # FLD AUTO: 5.07 K/UL — SIGNIFICANT CHANGE UP (ref 3.8–10.5)

## 2020-06-14 PROCEDURE — 99223 1ST HOSP IP/OBS HIGH 75: CPT | Mod: GC

## 2020-06-14 PROCEDURE — 99233 SBSQ HOSP IP/OBS HIGH 50: CPT

## 2020-06-14 PROCEDURE — 99232 SBSQ HOSP IP/OBS MODERATE 35: CPT | Mod: GC

## 2020-06-14 RX ORDER — LACTULOSE 10 G/15ML
30 SOLUTION ORAL EVERY 6 HOURS
Refills: 0 | Status: DISCONTINUED | OUTPATIENT
Start: 2020-06-14 | End: 2020-06-24

## 2020-06-14 RX ORDER — URSODIOL 250 MG/1
300 TABLET, FILM COATED ORAL
Refills: 0 | Status: DISCONTINUED | OUTPATIENT
Start: 2020-06-14 | End: 2020-06-20

## 2020-06-14 RX ADMIN — Medication 1: at 10:30

## 2020-06-14 RX ADMIN — URSODIOL 300 MILLIGRAM(S): 250 TABLET, FILM COATED ORAL at 19:06

## 2020-06-14 RX ADMIN — MEROPENEM 100 MILLIGRAM(S): 1 INJECTION INTRAVENOUS at 17:41

## 2020-06-14 RX ADMIN — Medication 500000 UNIT(S): at 05:30

## 2020-06-14 RX ADMIN — Medication 1 TABLET(S): at 13:24

## 2020-06-14 RX ADMIN — Medication 1: at 13:22

## 2020-06-14 RX ADMIN — Medication 500000 UNIT(S): at 17:38

## 2020-06-14 RX ADMIN — MIDODRINE HYDROCHLORIDE 20 MILLIGRAM(S): 2.5 TABLET ORAL at 17:38

## 2020-06-14 RX ADMIN — Medication 1 APPLICATION(S): at 17:38

## 2020-06-14 RX ADMIN — TRAMADOL HYDROCHLORIDE 25 MILLIGRAM(S): 50 TABLET ORAL at 14:03

## 2020-06-14 RX ADMIN — TAMSULOSIN HYDROCHLORIDE 0.4 MILLIGRAM(S): 0.4 CAPSULE ORAL at 21:42

## 2020-06-14 RX ADMIN — MIDODRINE HYDROCHLORIDE 20 MILLIGRAM(S): 2.5 TABLET ORAL at 13:24

## 2020-06-14 RX ADMIN — PANTOPRAZOLE SODIUM 40 MILLIGRAM(S): 20 TABLET, DELAYED RELEASE ORAL at 05:31

## 2020-06-14 RX ADMIN — Medication 1: at 17:37

## 2020-06-14 RX ADMIN — LACTULOSE 30 GRAM(S): 10 SOLUTION ORAL at 05:30

## 2020-06-14 RX ADMIN — LACTULOSE 30 GRAM(S): 10 SOLUTION ORAL at 17:39

## 2020-06-14 RX ADMIN — NYSTATIN CREAM 1 APPLICATION(S): 100000 CREAM TOPICAL at 17:39

## 2020-06-14 RX ADMIN — Medication 500000 UNIT(S): at 13:24

## 2020-06-14 RX ADMIN — MIDODRINE HYDROCHLORIDE 20 MILLIGRAM(S): 2.5 TABLET ORAL at 05:31

## 2020-06-14 RX ADMIN — NYSTATIN CREAM 1 APPLICATION(S): 100000 CREAM TOPICAL at 05:32

## 2020-06-14 RX ADMIN — Medication 1 MILLIGRAM(S): at 13:23

## 2020-06-14 RX ADMIN — INSULIN GLARGINE 4 UNIT(S): 100 INJECTION, SOLUTION SUBCUTANEOUS at 21:42

## 2020-06-14 RX ADMIN — Medication 3 UNIT(S): at 17:37

## 2020-06-14 RX ADMIN — MEROPENEM 100 MILLIGRAM(S): 1 INJECTION INTRAVENOUS at 05:30

## 2020-06-14 NOTE — CONSULT NOTE ADULT - ATTENDING COMMENTS
Cirrhosis with MISA  Likely prerenal ATN  Hold diuretics for now  Continue midodrine  Would place bob if possible for strict urine output monitoring  Repeat Concepcion    Cami Khalil MD  Off: [phone]  Cell: [phone]

## 2020-06-14 NOTE — PROGRESS NOTE ADULT - PROBLEM SELECTOR PLAN 2
Reported confusion and altered behavior at home. Now at baseline mental status, reported compliance with lactulose/rifaximin at home. Lactulose increased to 30g q 8 yesterday.     Pt is oriented to self and place but slow to respond. PT to get out of bed today. Reorientation. Monitor.

## 2020-06-14 NOTE — CONSULT NOTE ADULT - PROBLEM SELECTOR RECOMMENDATION 9
MISA likely 2/2 pre renal and/or ATN in setting hypotension, less likely HRS (Concepcion>35, no response tx)  Baseline sCr 1.0 in 5/2020, on admission sCr 1.57 (6/11), uptrend to 1.75 on 6/13.  Urine Na 112 ~ ATN, UPro/Cr 0.2 gram, UA showed UTI w/o protein/rbc.    Borderline hypotension during hospitalization (on midodrine), no response to albumin/midodrine treated, less likely HRS given Concepcion>35.  Good UOP during hospitalization, lasix/aldactone held.  Of note, prior MISA w/u Dec 2019 showed FLC elevated K/L ratio 2.91 (K>L) and immunofixation showing "one Weak IgA Lambda  Band and One Weak IgG Lambda Band Identified"    Recommend MISA likely 2/2 pre renal ATN in setting hypotension, decrease PO intake, need rule out obstruction, less likely HRS (Concepcion>35, no response tx)  Baseline sCr 1.0 in 5/2020, on admission sCr 1.57 (6/11), uptrend to 1.75 on 6/13.  Urine Na 112 ~ ATN, UPro/Cr 0.2 gram, UA showed UTI w/o protein/rbc.    Borderline hypotension during hospitalization (on midodrine), no response to albumin/midodrine treated, less likely HRS given Concepcion>35.  Good UOP during hospitalization, lasix/aldactone held.  Of note, prior MISA w/u Dec 2019 showed FLC elevated K/L ratio 2.91 (K>L) and immunofixation showing "one Weak IgA Lambda  Band and One Weak IgG Lambda Band Identified"    - Recommend HAYES catheter insertion to monitor I/O  - No absolute indication for dialysis, no fluid overload no refractory electrolyte derangement   - Avoid nephrotoxin meds such as NSAIDS, PPI, ACEI/ARB, and contrast agents.  - Renally dose medications per GFR

## 2020-06-14 NOTE — PROGRESS NOTE ADULT - PROBLEM SELECTOR PLAN 5
Called patient and notified of results.    HFpEF (EF 70%). Appears euvolemic   -hold diuretics in setting of dena,

## 2020-06-14 NOTE — PROGRESS NOTE ADULT - PROBLEM SELECTOR PLAN 1
No symptoms, possible chronic colonization. Has been on Meropenem, would d/c abx if urine cx non-diagnostic.    H/o prostatic abscess s/p treatment in March 2020 resolved on most recent imaging from April

## 2020-06-14 NOTE — PROGRESS NOTE ADULT - SUBJECTIVE AND OBJECTIVE BOX
Interval Events:   No abdominal pain  No nausea /vomiting / diarrhea   No melena / bloody bm     Hospital Medications:  dextrose 40% Gel 15 Gram(s) Oral once PRN  dextrose 5%. 1000 milliLiter(s) IV Continuous <Continuous>  dextrose 50% Injectable 12.5 Gram(s) IV Push once  dextrose 50% Injectable 25 Gram(s) IV Push once  dextrose 50% Injectable 25 Gram(s) IV Push once  folic acid 1 milliGRAM(s) Oral daily  glucagon  Injectable 1 milliGRAM(s) IntraMuscular once PRN  insulin glargine Injectable (LANTUS) 4 Unit(s) SubCutaneous at bedtime  insulin lispro (HumaLOG) corrective regimen sliding scale   SubCutaneous three times a day before meals  insulin lispro (HumaLOG) corrective regimen sliding scale   SubCutaneous at bedtime  insulin lispro Injectable (HumaLOG) 3 Unit(s) SubCutaneous three times a day before meals  lactulose Syrup 30 Gram(s) Oral every 8 hours  meropenem  IVPB      meropenem  IVPB 1000 milliGRAM(s) IV Intermittent every 12 hours  midodrine. 20 milliGRAM(s) Oral three times a day  nystatin    Suspension 394957 Unit(s) Oral four times a day  nystatin Cream 1 Application(s) Topical two times a day  pantoprazole    Tablet 40 milliGRAM(s) Oral before breakfast  rifAXIMin 550 milliGRAM(s) Oral two times a day  silver sulfADIAZINE 1% Cream 1 Application(s) Topical two times a day  tamsulosin 0.4 milliGRAM(s) Oral at bedtime  traMADol 25 milliGRAM(s) Oral two times a day PRN  trimethoprim  160 mG/sulfamethoxazole 800 mG 1 Tablet(s) Oral daily        ROS:   General:  No fevers, chills or night sweats  ENT:  No sore throat or dysphagia  CV:  No pain or palpitations  Resp:  No dyspnea, cough or  wheezing  GI:  as above  Skin:  No rash or edema  Neuro: no weakness   Hematologic: no bleeding  Musculoskeletal: no muscle pain or join pain  Psych: no agitation      PHYSICAL EXAM:   Vital Signs:  Vital Signs Last 24 Hrs  T(C): 36.7 (14 Jun 2020 07:53), Max: 36.7 (13 Jun 2020 08:54)  T(F): 98.1 (14 Jun 2020 07:53), Max: 98.1 (13 Jun 2020 08:54)  HR: 79 (14 Jun 2020 07:53) (75 - 83)  BP: 127/77 (14 Jun 2020 07:53) (108/53 - 133/68)  BP(mean): --  RR: 18 (14 Jun 2020 07:53) (17 - 18)  SpO2: 97% (14 Jun 2020 07:53) (97% - 100%)  Daily     Daily     GENERAL:  NAD, Appears stated age  HEENT:  NC/AT,  conjunctivae clear and pink, sclera -icteric  CHEST:  Normal Effort, Breath sounds clear  HEART:  RRR, S1 + S2, no murmurs  ABDOMEN:  Soft, non-tender, non-distended, normoactive bowel sounds,  no masses  EXTREMITIES:  no cyanosis or edema  SKIN:  Warm & Dry. No rash or erythema  NEURO:  Alert, oriented, + asterixis     LABS:                        9.0    5.07  )-----------( 68       ( 14 Jun 2020 07:17 )             26.4     Mean Cell Volume: 106.5 fl (06-14-20 @ 07:17)    06-14    131<L>  |  100  |  27<H>  ----------------------------<  141<H>  5.1   |  20<L>  |  1.74<H>    Ca    10.5      14 Jun 2020 07:17    TPro  6.3  /  Alb  3.8  /  TBili  11.0<H>  /  DBili  x   /  AST  24  /  ALT  15  /  AlkPhos  133<H>  06-14    LIVER FUNCTIONS - ( 14 Jun 2020 07:17 )  Alb: 3.8 g/dL / Pro: 6.3 g/dL / ALK PHOS: 133 U/L / ALT: 15 U/L / AST: 24 U/L / GGT: x           PT/INR - ( 13 Jun 2020 10:19 )   PT: 26.6 sec;   INR: 2.27 ratio         PTT - ( 13 Jun 2020 10:19 )  PTT:46.2 sec                            9.0    5.07  )-----------( 68       ( 14 Jun 2020 07:17 )             26.4                         6.9    3.57  )-----------( 58       ( 13 Jun 2020 07:29 )             20.7                         7.2    3.62  )-----------( 59       ( 12 Jun 2020 11:12 )             21.2                         7.6    4.24  )-----------( 65       ( 11 Jun 2020 11:33 )             22.9       Imaging:

## 2020-06-14 NOTE — PROGRESS NOTE ADULT - ASSESSMENT
Patient denies encepaholapthy  anemia  gi bleed    plan  check cbc after prbc  in and out  ppi once a day  add ursodiol 300mg po tid  gerd precautions   hepatology team on case  ? awaiting liver transplant

## 2020-06-14 NOTE — PROGRESS NOTE ADULT - SUBJECTIVE AND OBJECTIVE BOX
INTERVAL HPI/OVERNIGHT EVENTS:  No new overnight event.  No N/V/D.  Tolerating diet.  less confused s/p blood    Allergies    codeine (Anaphylaxis)    Intolerances    General:  No wt loss, fevers, chills, night sweats, fatigue,   Eyes:  Good vision, no reported pain  ENT:  No sore throat, pain, runny nose, dysphagia  CV:  No pain, palpitations, hypo/hypertension  Resp:  No dyspnea, cough, tachypnea, wheezing  GI:  No pain, No nausea, No vomiting, No diarrhea, No constipation, No weight loss, No fever, No pruritis, No rectal bleeding, No tarry stools, No dysphagia,  :  No pain, bleeding, incontinence, nocturia  Muscle:  No pain, weakness  Neuro:  No weakness, tingling, memory problems  Psych:  No fatigue, insomnia, mood problems, depression  Endocrine:  No polyuria, polydipsia, cold/heat intolerance  Heme:  No petechiae, ecchymosis, easy bruisability  Skin:  No rash, tattoos, scars, edema    PHYSICAL EXAM:   Vital Signs:  Vital Signs Last 24 Hrs  T(C): 36.7 (14 Jun 2020 07:53), Max: 36.7 (14 Jun 2020 07:53)  T(F): 98.1 (14 Jun 2020 07:53), Max: 98.1 (14 Jun 2020 07:53)  HR: 79 (14 Jun 2020 07:53) (75 - 80)  BP: 127/77 (14 Jun 2020 07:53) (108/53 - 133/68)  BP(mean): --  RR: 18 (14 Jun 2020 07:53) (17 - 18)  SpO2: 97% (14 Jun 2020 07:53) (97% - 100%)  Daily     Daily I&O's Summary    13 Jun 2020 07:01  -  14 Jun 2020 07:00  --------------------------------------------------------  IN: 350 mL / OUT: 250 mL / NET: 100 mL    14 Jun 2020 07:01  -  14 Jun 2020 12:30  --------------------------------------------------------  IN: 0 mL / OUT: 350 mL / NET: -350 mL        GENERAL:  Appears stated age, well-groomed, well-nourished, no distress  HEENT:  NC/AT,  conjunctivae clear and pink, no thyromegaly, nodules, adenopathy, no JVD, sclera -anicteric  CHEST:  Full & symmetric excursion, no increased effort, breath sounds clear  HEART:  Regular rhythm, S1, S2, no murmur/rub/S3/S4, no abdominal bruit, no edema  ABDOMEN:  Soft, non-tender, non-distended, normoactive bowel sounds,  no masses ,no hepato-splenomegaly, no signs of chronic liver disease  EXTEREMITIES:  no cyanosis,clubbing or edema  SKIN:  No rash/erythema/ecchymoses/petechiae/wounds/abscess/warm/dry  NEURO:  Alert, oriented, no asterixis, no tremor, no encephalopathy      LABS:                        9.0    5.07  )-----------( 68       ( 14 Jun 2020 07:17 )             26.4     06-14    131<L>  |  100  |  27<H>  ----------------------------<  141<H>  5.1   |  20<L>  |  1.74<H>    Ca    10.5      14 Jun 2020 07:17    TPro  6.3  /  Alb  3.8  /  TBili  11.0<H>  /  DBili  x   /  AST  24  /  ALT  15  /  AlkPhos  133<H>  06-14    PT/INR - ( 14 Jun 2020 09:19 )   PT: 26.3 sec;   INR: 2.23 ratio         PTT - ( 14 Jun 2020 09:19 )  PTT:44.2 sec    amylase   lipase  RADIOLOGY & ADDITIONAL TESTS:

## 2020-06-14 NOTE — PROGRESS NOTE ADULT - ASSESSMENT
64 M hx of DM, GERD, HFpEF with mild LV diastolic dysfunction, and decompensated JEAN cirrhosis c/b ascites with hx of SBP, HE, duodenal ulcer, GAVE and duodenal AVM s/p APC, recent admission for ESBL E coli prostatic abscess 2/2 4 wks ertapenem, hx of ESBL UTIs, recent VRE urinary colonization came to hospital for worsening jaundice and episode of confusion.    UA positive, has had persistently positive UA/Ucx on previous visits, has been on suppressive bactrim for UTIs since discharge in late April 2020

## 2020-06-14 NOTE — CONSULT NOTE ADULT - ASSESSMENT
Pt with MISA likely 2/2 pre renal and/or ATN in setting hypotension, less likely HRS (Concepcion>35, no response tx)

## 2020-06-14 NOTE — PROGRESS NOTE ADULT - PROBLEM SELECTOR PLAN 6
Insulin dependent DM. A1c in april 4.8  -c/w home regimen: Lantus 4units QHS, humalog 3 un qAc Insulin dependent DM. A1c 5.4.  -c/w home regimen: Lantus 4units QHS, humalog 3 un qAc

## 2020-06-14 NOTE — PROGRESS NOTE ADULT - ASSESSMENT
64 M hx of DM, HLD, GERD, HFpEF with mild LV diastolic dysfunction, and decompensated JEAN cirrhosis c/b ascites with hx of SBP, HE, duodenal ulcer, GAVE and duodenal AVM s/p APC and recent admission for ESBL E coli prostatic abscess 2/2 4 wks ertapenem, hx of ESBL UTIs, recent VRE urinary colonization came to hospital for worsening jaundice and episode of confusion, resolved PTA.   Acute episode lasting approximately 12 hours resolved. Pt is afebrile without leukocytosis, denies any localizing symptoms, no source of infection noted from imaging  -labs with significant pyuria and bacteria. Pt has persistently had positive UA/Ucx on previous visits, appears to be colonized.   Pt has been on suppressive bactrim for UTIs since discharge in late April 2020      Impression:   # Anemia, drop in hemoglobin to 6.9 (7.5 on admission from 8.5 baseline on 05/2020) without observed GI bleeding, possibly due to known GAVE. s/p 1 unit of PRBC with appropriate response   # MISA: FENa: 2.2 DDx: ATN, ( s/p 2 days of IV albumin still rising ) less likely HRS or pre renal   # Recurrent UTIs on bactrim at home   # Acute on chronic hepatic encephalopathy, + asterixis on exam   # Decompensated cirrhosis due to JEAN. MELD Na 30. He is listed for liver transplant.   -ascites: minimal ascites on clinical exam. On lasix/aldactone 40(alternating 20/40) and 100 at home  -hepatic encephalopathy on lactulose and rifaximin, currently AAO x 3  -HCC: not seen on CT from 4/25  -varices: EGD in 12/19 showed small varices and GAVE. Pt not on BB therapy.     # R posterior heel wound to subQ w/ overlying eschar, stable w/ no signs of infection -XR negative for gas or OM ( evaluated by podiatry)   # h/o prostatic abscess s/p treatment in March 2020 resolved on most recent imaging from April    Recommendation:   - Continue lactulose dose to 30 grams TID and titrate the dose for goal 3-4 bm / day   - Discontinue IV albumin  - Follow up nephrology recommendations  - Continue to monitor h&h and transfuse if hgb drop below 7  - IV PPI 40 mg bid for now  - Continue to hold home diuretics for now  - Continue Abx as per ID. Follow up urine culture result   - low Na diet  - c/w rifaximin  - c/w midodrine  - trend MELD labs 64 M hx of DM, HLD, GERD, HFpEF with mild LV diastolic dysfunction, and decompensated JEAN cirrhosis c/b ascites with hx of SBP, HE, duodenal ulcer, GAVE and duodenal AVM s/p APC and recent admission for ESBL E coli prostatic abscess 2/2 4 wks ertapenem, hx of ESBL UTIs, recent VRE urinary colonization came to hospital for worsening jaundice and episode of confusion, resolved PTA.   Acute episode lasting approximately 12 hours resolved. Pt is afebrile without leukocytosis, denies any localizing symptoms, no source of infection noted from imaging  -labs with significant pyuria and bacteria. Pt has persistently had positive UA/Ucx on previous visits, appears to be colonized.   Pt has been on suppressive bactrim for UTIs since discharge in late April 2020      Impression:   # Anemia, drop in hemoglobin to 6.9 (7.5 on admission from 8.5 baseline on 05/2020) without observed GI bleeding, possibly due to known GAVE. s/p 1 unit of PRBC with appropriate response   # MISA: FENa: 2.2 DDx: ATN, ( s/p 2 days of IV albumin still rising ) less likely HRS or pre renal   # Recurrent UTIs on bactrim at home   # Acute on chronic hepatic encephalopathy, + asterixis on exam   # Decompensated cirrhosis due to JEAN. MELD Na 32 (06/14/20) INR from 06/13. He is listed for liver transplant.   -ascites: minimal ascites on clinical exam. On lasix/aldactone 40(alternating 20/40) and 100 at home  -hepatic encephalopathy on lactulose and rifaximin, currently AAO x 3  -HCC: not seen on CT from 4/25  -varices: EGD in 12/19 showed small varices and GAVE. Pt not on BB therapy.     # R posterior heel wound to subQ w/ overlying eschar, stable w/ no signs of infection -XR negative for gas or OM ( evaluated by podiatry)   # h/o prostatic abscess s/p treatment in March 2020 resolved on most recent imaging from April    Recommendation:   - Continue lactulose dose to 30 grams TID and titrate the dose for goal 3-4 bm / day   - Discontinue IV albumin  - Follow up nephrology recommendations  - Continue to monitor h&h and transfuse if hgb drop below 7  - IV PPI 40 mg bid for now  - Continue to hold home diuretics for now  - Continue Abx as per ID. Follow up urine culture result   - low Na diet  - c/w rifaximin  - c/w midodrine  - trend MELD labs 64 M hx of DM, HLD, GERD, HFpEF with mild LV diastolic dysfunction, and decompensated JEAN cirrhosis c/b ascites with hx of SBP, HE, duodenal ulcer, GAVE and duodenal AVM s/p APC and recent admission for ESBL E coli prostatic abscess 2/2 4 wks ertapenem, hx of ESBL UTIs, recent VRE urinary colonization came to hospital for worsening jaundice and episode of confusion, resolved PTA.   Acute episode lasting approximately 12 hours resolved. Pt is afebrile without leukocytosis, denies any localizing symptoms, no source of infection noted from imaging  -labs with significant pyuria and bacteria. Pt has persistently had positive UA/Ucx on previous visits, appears to be colonized.   Pt has been on suppressive bactrim for UTIs since discharge in late April 2020      Impression:   # Anemia, drop in hemoglobin to 6.9 (7.5 on admission from 8.5 baseline on 05/2020) without observed GI bleeding, possibly due to known GAVE. s/p 1 unit of PRBC with appropriate response   # MISA: FENa: 2.2 DDx: ATN, ( s/p 2 days of IV albumin still rising ) less likely HRS or pre renal   # Recurrent UTIs on bactrim at home   # Acute on chronic hepatic encephalopathy, + asterixis on exam   # Decompensated cirrhosis due to JEAN. MELD Na 32 (06/14/20) INR from 06/13. He is listed for liver transplant.   -ascites: minimal ascites on clinical exam. On lasix/aldactone 40(alternating 20/40) and 100 at home  -hepatic encephalopathy on lactulose and rifaximin, currently AAO x 3  -HCC: not seen on CT from 4/25  -varices: EGD in 12/19 showed small varices and GAVE. Pt not on BB therapy.     # R posterior heel wound to subQ w/ overlying eschar, stable w/ no signs of infection -XR negative for gas or OM ( evaluated by podiatry)   # h/o prostatic abscess s/p treatment in March 2020 resolved on most recent imaging from April    Recommendation:   - Increase lactulose dose to 30 grams every 6 hours and then titrate the dose for goal 3-4 bm / day   - Follow up nephrology recommendations  - Continue to monitor h&h and transfuse if hgb drop below 7  - IV PPI 40 mg bid for now  - Continue to hold home diuretics for now  - Continue Abx as per ID. Follow up urine culture result   - low Na diet  - c/w rifaximin  - c/w midodrine  - trend MELD labs

## 2020-06-14 NOTE — PROGRESS NOTE ADULT - PROBLEM SELECTOR PLAN 3
Decompensated cirrhosis due to JEAN, MELD-Na 32  -pt on liver transplant list  -EGD in 12/19 showed small varices and GAVE  -POCUS in ED with no pocket amenable to safely perform paracentesis

## 2020-06-14 NOTE — CONSULT NOTE ADULT - SUBJECTIVE AND OBJECTIVE BOX
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    Nephrology consulted for MISA.  [facility] s[facility] 1.0 in 5/2020). On admission serum creatinine 1.57 on 6/11 which worsened to 1.75 on 6/13.  Urinalysis showed UTI (bacteria, LE, wbc) with no protein/rbc.  Urine sodium 112 and urine protein to creatinine was 0.2 g.  Home medication lasix and aldactone (held during this hospitalization).  No IV contrast during this hospitalization.  Prior CT A/P showed 0.6 cm left non obstructing calculi.  Other lab noted for Hgb 6.9 given 1U PRBC 6/13.  Hepatology following, patient on albumin and midodrine without improvement of sCr.  Patient was hypotensive 94/52 on admission and good UOP during hospitalization.  Hepatology following, pt started on midodrine and albumin however serum Cr without improvement.  Of note, prior MISA workup in Dec 2019 showed elevated K/L ratio 2.91 (K>L) and immunofixation showing "one Weak IgA Lambda  Band and One Weak IgG Lambda Band Identified" otherwise normal complement, neg SRAVAN/dsDNA/ANCA/      PAST HISTORY  --------------------------------------------------------------------------------  PAST MEDICAL & SURGICAL HISTORY:  GIB (gastrointestinal bleeding)  GERD with esophagitis: Gastritis &amp; Non Bleeding Ulcers  Hepatic encephalopathy  Obesity  Fatty liver disease, nonalcoholic  Renal stones: 25 years ago  Hypertension  Neuropathy  Hypercholesteremia  Diabetes  S/P cholecystectomy    FAMILY HISTORY:  Family history of type 2 diabetes mellitus  Family history of hypertension  Family history of stomach cancer    PAST SOCIAL HISTORY:  ALLERGIES & MEDICATIONS  --------------------------------------------------------------------------------  Allergies    codeine (Anaphylaxis)    Intolerances      Standing Inpatient Medications  dextrose 5%. 1000 milliLiter(s) IV Continuous <Continuous>  dextrose 50% Injectable 12.5 Gram(s) IV Push once  dextrose 50% Injectable 25 Gram(s) IV Push once  dextrose 50% Injectable 25 Gram(s) IV Push once  folic acid 1 milliGRAM(s) Oral daily  insulin glargine Injectable (LANTUS) 4 Unit(s) SubCutaneous at bedtime  insulin lispro (HumaLOG) corrective regimen sliding scale   SubCutaneous three times a day before meals  insulin lispro (HumaLOG) corrective regimen sliding scale   SubCutaneous at bedtime  insulin lispro Injectable (HumaLOG) 3 Unit(s) SubCutaneous three times a day before meals  lactulose Syrup 30 Gram(s) Oral every 8 hours  meropenem  IVPB      meropenem  IVPB 1000 milliGRAM(s) IV Intermittent every 12 hours  midodrine. 20 milliGRAM(s) Oral three times a day  nystatin    Suspension 113192 Unit(s) Oral four times a day  nystatin Cream 1 Application(s) Topical two times a day  pantoprazole    Tablet 40 milliGRAM(s) Oral before breakfast  rifAXIMin 550 milliGRAM(s) Oral two times a day  silver sulfADIAZINE 1% Cream 1 Application(s) Topical two times a day  tamsulosin 0.4 milliGRAM(s) Oral at bedtime  trimethoprim  160 mG/sulfamethoxazole 800 mG 1 Tablet(s) Oral daily    PRN Inpatient Medications  dextrose 40% Gel 15 Gram(s) Oral once PRN  glucagon  Injectable 1 milliGRAM(s) IntraMuscular once PRN  traMADol 25 milliGRAM(s) Oral two times a day PRN    REVIEW OF SYSTEMS  --------------------------------------------------------------------------------    All other systems were reviewed and are negative, except as noted.  VITALS/PHYSICAL EXAM  --------------------------------------------------------------------------------  T(C): 36.5 (06-13-20 @ 23:41), Max: 36.7 (06-13-20 @ 08:54)  HR: 79 (06-14-20 @ 05:51) (75 - 83)  BP: 133/68 (06-14-20 @ 05:51) (108/53 - 133/68)  RR: 17 (06-13-20 @ 23:41) (17 - 18)  SpO2: 100% (06-13-20 @ 23:41) (98% - 100%)  Wt(kg): --        06-12-20 @ 07:01  -  06-13-20 @ 07:00  --------------------------------------------------------  IN: 500 mL / OUT: 2050 mL / NET: -1550 mL    06-13-20 @ 07:01  -  06-14-20 @ 06:31  --------------------------------------------------------  IN: 350 mL / OUT: 250 mL / NET: 100 mL      Physical Exam:  to be exam      LABS/STUDIES  --------------------------------------------------------------------------------                6.9    3.57  >-----------<  58       [06-13-20 @ 07:29]              20.7     133  |  102  |  27  ----------------------------<  121      [06-13-20 @ 07:29]  5.3   |  22  |  1.75        Ca     10.0     [06-13-20 @ 07:29]    TPro  5.8  /  Alb  3.4  /  TBili  9.0  /  DBili  x   /  AST  21  /  ALT  15  /  AlkPhos  115  [06-13-20 @ 07:29]    PT/INR: PT 26.6 , INR 2.27       [06-13-20 @ 10:19]  PTT: 46.2       [06-13-20 @ 10:19]          [06-13-20 @ 07:29]    Creatinine Trend:  SCr 1.75 [06-13 @ 07:29]  SCr 1.55 [06-12 @ 11:13]  SCr 1.57 [06-11 @ 11:33]  SCr 1.23 [05-18 @ 08:22]  SCr 1.16 [05-15 @ 08:22]    Urinalysis - [06-11-20 @ 19:57]      Color Yellow / Appearance Clear / SG 1.013 / pH 5.0      Gluc Negative / Ketone Negative  / Bili Negative / Urobili 2 mg/dL       Blood Small / Protein Negative / Leuk Est Large / Nitrite Negative      RBC 4 / WBC 54 / Hyaline 0 / Gran  / Sq Epi  / Non Sq Epi 0 / Bacteria Many    Urine Creatinine 61      [06-11-20 @ 19:57]  Urine Protein 10      [06-11-20 @ 19:57]  Urine Sodium 112      [06-11-20 @ 19:57]  Urine Urea Nitrogen 450      [06-12-20 @ 02:46]    Iron 160, TIBC Unable to calculate Test Repeated, %sat Unable to calculate Test Repeated      [06-12-20 @ 09:04]  Ferritin 493      [04-29-20 @ 08:20]  PTH -- (Ca 10.3)      [12-13-19 @ 08:11]   12  Vitamin D (25OH) 25.3      [12-13-19 @ 08:11]  HbA1c 6.4      [02-12-20 @ 17:08]  TSH 3.26      [04-26-20 @ 09:47]  Lipid: chol 71, TG 63, HDL 30, LDL 28      [11-27-19 @ 09:11]    HBsAb <3.0      [12-04-19 @ 08:33]  HBsAb Nonreact      [06-13-20 @ 10:35]  HBsAg Nonreact      [06-13-20 @ 10:35]  HBcAb Nonreact      [06-13-20 @ 10:35]  HCV 0.14, Nonreact      [12-04-19 @ 08:33]  HIV Nonreact      [10-23-19 @ 05:08]    SRAVAN: titer Negative, pattern --      [12-03-19 @ 09:01]  dsDNA <12      [02-05-18 @ 18:48]  C3 Complement 36      [11-27-19 @ 00:11]  C4 Complement 7      [11-27-19 @ 00:11]  ANCA: cANCA Negative, pANCA Negative, atypical ANCA Negative      [02-05-18 @ 18:48]  Syphilis Screen (Treponema Pallidum Ab) Negative      [12-04-19 @ 08:33]  Free Light Chains: kappa 10.93, lambda 3.76, ratio = 2.91      [12-06 @ 09:43]  Immunofixation Serum:    One Weak IgA Lambda  Band and One Weak IgG Lambda Band Identified    Reference Range: None Detected      [04-29-20 @ 08:20]  SPEP Interpretation: Two Weak Gamma-Migrating Paraproteins Identified      [04-29-20 @ 08:20]  Immunofixation Urine: Reference Range: None Detected      [12-04-19 @ 14:03]  UPEP Interpretation: Weak Gamma-Migrating Paraprotein Identified      [12-04-19 @ 14:03] [facility] DIVISION OF KIDNEY DISEASES AND HYPERTENSION -- INITIAL CONSULT NOTE  Luciana Mcginnis  Nephrology Fellow  NS Pager: [phone] / EVELIO Pager: [phone]  After 5pm or on weekend, please page the on-call fellow)  --------------------------------------------------------------------------------    HPI:  64M PMHx HFpEF, 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    Nephrology consulted for MISA.  [facility] s[facility] 1.0 in [facility]/2020). On admission serum creatinine 1.57 on 6/11 which worsened to 1.75 on 6/13.  Urinalysis showed UTI (bacteria, LE, wbc) with no protein/rbc.  Urine sodium 112 and urine protein to creatinine was 0.2 g.  Home medication lasix and aldactone (held during this hospitalization).  No IV contrast during this hospitalization.  Prior CT A/P showed 0.6 cm left non obstructing calculi.  Other lab noted for Hgb 6.9 given 1U PRBC 6/13.  Hepatology following, patient on albumin and midodrine without improvement of sCr.  Patient was hypotensive 94/52 on admission and good UOP during hospitalization.  Hepatology following, pt started on midodrine and albumin however serum Cr without improvement.  Of note, prior MSIA workup in Dec 2019 showed elevated K/L ratio 2.91 (K>L) and immunofixation showing "one Weak IgA Lambda  Band and One Weak IgG Lambda Band Identified" otherwise normal complement, neg SRAVAN/dsDNA/ANCA.    Patient denies any NSAIDs use, hx kidney disease, dysuria, foamy urine, chest pain, sob, abdominal pain, nausea, vomiting, diarrhea, fever or chills.  RN report yesterday patient had multiple episodes urinary incontinent wherein urinated on bed/sheets.    PAST HISTORY  --------------------------------------------------------------------------------  PAST MEDICAL & SURGICAL HISTORY:  GIB (gastrointestinal bleeding)  GERD with esophagitis: Gastritis &amp; Non Bleeding Ulcers  Hepatic encephalopathy  Obesity  Fatty liver disease, nonalcoholic  Renal stones: 25 years ago  Hypertension  Neuropathy  Hypercholesteremia  Diabetes  S/P cholecystectomy    FAMILY HISTORY:  Family history of type 2 diabetes mellitus  Family history of hypertension  Family history of stomach cancer    PAST SOCIAL HISTORY:  ALLERGIES & MEDICATIONS  --------------------------------------------------------------------------------  Allergies    codeine (Anaphylaxis)    Intolerances      Standing Inpatient Medications  dextrose 5%. 1000 milliLiter(s) IV Continuous <Continuous>  dextrose 50% Injectable 12.5 Gram(s) IV Push once  dextrose 50% Injectable 25 Gram(s) IV Push once  dextrose 50% Injectable 25 Gram(s) IV Push once  folic acid 1 milliGRAM(s) Oral daily  insulin glargine Injectable (LANTUS) 4 Unit(s) SubCutaneous at bedtime  insulin lispro (HumaLOG) corrective regimen sliding scale   SubCutaneous three times a day before meals  insulin lispro (HumaLOG) corrective regimen sliding scale   SubCutaneous at bedtime  insulin lispro Injectable (HumaLOG) 3 Unit(s) SubCutaneous three times a day before meals  lactulose Syrup 30 Gram(s) Oral every 8 hours  meropenem  IVPB      meropenem  IVPB 1000 milliGRAM(s) IV Intermittent every 12 hours  midodrine. 20 milliGRAM(s) Oral three times a day  nystatin    Suspension [id number] Unit(s) Oral four times a day  nystatin Cream 1 Application(s) Topical two times a day  pantoprazole    Tablet 40 milliGRAM(s) Oral before breakfast  rifAXIMin 550 milliGRAM(s) Oral two times a day  silver sulfADIAZINE 1% Cream 1 Application(s) Topical two times a day  tamsulosin 0.4 milliGRAM(s) Oral at bedtime  trimethoprim  160 mG/sulfamethoxazole 800 mG 1 Tablet(s) Oral daily    PRN Inpatient Medications  dextrose 40% Gel 15 Gram(s) Oral once PRN  glucagon  Injectable 1 milliGRAM(s) IntraMuscular once PRN  traMADol 25 milliGRAM(s) Oral two times a day PRN    REVIEW OF SYSTEMS  --------------------------------------------------------------------------------  Gen: no fever, chills, weakness  Respiratory: No dyspnea, cough  CV: No chest pain, orthopnea  GI: No abdominal pain, nausea, vomiting, diarrhea  MSK: no edema  Neuro: No dizziness, lightheadedness  Heme: No bleeding    All other systems were reviewed and are negative, except as noted.  VITALS/PHYSICAL EXAM  --------------------------------------------------------------------------------  T(C): 36.5 (06-13-20 @ 23:41), Max: 36.7 (06-13-20 @ 08:54)  HR: 79 (06-14-20 @ 05:51) (75 - 83)  BP: 133/68 (06-14-20 @ 05:51) (108/53 - 133/68)  RR: 17 (06-13-20 @ 23:41) (17 - 18)  SpO2: 100% (06-13-20 @ 23:41) (98% - 100%)  Wt(kg): --        06-12-20 @ 07:01  -  06-13-20 @ 07:00  --------------------------------------------------------  IN: 500 mL / OUT: 2050 mL / NET: -1550 mL    06-13-20 @ 07:01  -  06-14-20 @ 06:31  --------------------------------------------------------  IN: 350 mL / OUT: 250 mL / NET: 100 mL      Physical Exam:  	Gen: NAD, well-appearing on room air  	HEENT: moist mucous membrane  	Pulm: CTA B/L, no crackles  	CV: RRR, S1S2; no rub/murmur  	GI: +BS, soft, nontender  	: No suprapubic tenderness  	MSK: Warm, no edema, mild asterixis              Neuro: AAOx3  	Psych: Normal affect and mood  	Skin: Warm, no cyanosis      LABS/STUDIES  --------------------------------------------------------------------------------                6.9    3.57  >-----------<  58       [06-13-20 @ 07:29]              20.7     133  |  102  |  27  ----------------------------<  121      [06-13-20 @ 07:29]  5.3   |  22  |  1.75        Ca     10.0     [06-13-20 @ 07:29]    TPro  5.8  /  Alb  3.4  /  TBili  9.0  /  DBili  x   /  AST  21  /  ALT  15  /  AlkPhos  115  [06-13-20 @ 07:29]    PT/INR: PT 26.6 , INR 2.27       [06-13-20 @ 10:19]  PTT: 46.2       [06-13-20 @ 10:19]          [06-13-20 @ 07:29]    Creatinine Trend:  SCr 1.75 [06-13 @ 07:29]  SCr 1.55 [06-12 @ 11:13]  SCr 1.57 [06-11 @ 11:33]  SCr 1.23 [05-18 @ 08:22]  SCr 1.16 [05-15 @ 08:22]    Urinalysis - [06-11-20 @ 19:57]      Color Yellow / Appearance Clear / SG 1.013 / pH 5.0      Gluc Negative / Ketone Negative  / Bili Negative / Urobili 2 mg/dL       Blood Small / Protein Negative / Leuk Est Large / Nitrite Negative      RBC 4 / WBC 54 / Hyaline 0 / Gran  / Sq Epi  / Non Sq Epi 0 / Bacteria Many    Urine Creatinine 61      [06-11-20 @ 19:57]  Urine Protein 10      [06-11-20 @ 19:57]  Urine Sodium 112      [06-11-20 @ 19:57]  Urine Urea Nitrogen 450      [06-12-20 @ 02:46]    Iron 160, TIBC Unable to calculate Test Repeated, %sat Unable to calculate Test Repeated      [06-12-20 @ 09:04]  Ferritin 493      [04-29-20 @ 08:20]  PTH -- (Ca 10.3)      [12-13-19 @ 08:11]   12  Vitamin D (25OH) 25.3      [12-13-19 @ 08:11]  HbA1c 6.4      [02-12-20 @ 17:08]  TSH 3.26      [04-26-20 @ 09:47]  Lipid: chol 71, TG 63, HDL 30, LDL 28      [11-27-19 @ 09:11]    HBsAb <3.0      [12-04-19 @ 08:33]  HBsAb Nonreact      [06-13-20 @ 10:35]  HBsAg Nonreact      [06-13-20 @ 10:35]  HBcAb Nonreact      [06-13-20 @ 10:35]  HCV 0.14, Nonreact      [12-04-19 @ 08:33]  HIV Nonreact      [10-23-19 @ 05:08]    SRAVAN: titer Negative, pattern --      [12-03-19 @ 09:01]  dsDNA <12      [02-05-18 @ 18:48]  C3 Complement 36      [11-27-19 @ 00:11]  C4 Complement 7      [11-27-19 @ 00:11]  ANCA: cANCA Negative, pANCA Negative, atypical ANCA Negative      [02-05-18 @ 18:48]  Syphilis Screen (Treponema Pallidum Ab) Negative      [12-04-19 @ 08:33]  Free Light Chains: kappa 10.93, lambda 3.76, ratio = 2.91      [12-06 @ 09:43]  Immunofixation Serum:    One Weak IgA Lambda  Band and One Weak IgG Lambda Band Identified    Reference Range: None Detected      [04-29-20 @ 08:20]  SPEP Interpretation: Two Weak Gamma-Migrating Paraproteins Identified      [04-29-20 @ 08:20]  Immunofixation Urine: Reference Range: None Detected      [12-04-19 @ 14:03]  UPEP Interpretation: Weak Gamma-Migrating Paraprotein Identified      [12-04-19 @ 14:03]

## 2020-06-14 NOTE — PROGRESS NOTE ADULT - SUBJECTIVE AND OBJECTIVE BOX
Patient is a 64y old  Male who presents with a chief complaint of Liver failure, hepatic encephalopathy (14 Jun 2020 07:56)    HPI: Pt appears slightly more alert today. Per pt, he had 2 BMs yesterday and one this morning.    Vital Signs Last 24 Hrs  T(C): 36.7 (14 Jun 2020 07:53), Max: 36.7 (14 Jun 2020 07:53)  T(F): 98.1 (14 Jun 2020 07:53), Max: 98.1 (14 Jun 2020 07:53)  HR: 79 (14 Jun 2020 07:53) (75 - 80)  BP: 127/77 (14 Jun 2020 07:53) (108/53 - 133/68)  BP(mean): --  RR: 18 (14 Jun 2020 07:53) (17 - 18)  SpO2: 97% (14 Jun 2020 07:53) (97% - 100%)                          9.0    5.07  )-----------( 68       ( 14 Jun 2020 07:17 )             26.4     06-14    131<L>  |  100  |  27<H>  ----------------------------<  141<H>  5.1   |  20<L>  |  1.74<H>    Ca    10.5      14 Jun 2020 07:17    TPro  6.3  /  Alb  3.8  /  TBili  11.0<H>  /  DBili  x   /  AST  24  /  ALT  15  /  AlkPhos  133<H>  06-14    MEDICATIONS  (STANDING):  dextrose 5%. 1000 milliLiter(s) (50 mL/Hr) IV Continuous <Continuous>  dextrose 50% Injectable 12.5 Gram(s) IV Push once  dextrose 50% Injectable 25 Gram(s) IV Push once  dextrose 50% Injectable 25 Gram(s) IV Push once  folic acid 1 milliGRAM(s) Oral daily  insulin glargine Injectable (LANTUS) 4 Unit(s) SubCutaneous at bedtime  insulin lispro (HumaLOG) corrective regimen sliding scale   SubCutaneous three times a day before meals  insulin lispro (HumaLOG) corrective regimen sliding scale   SubCutaneous at bedtime  insulin lispro Injectable (HumaLOG) 3 Unit(s) SubCutaneous three times a day before meals  lactulose Syrup 30 Gram(s) Oral every 8 hours  meropenem  IVPB      meropenem  IVPB 1000 milliGRAM(s) IV Intermittent every 12 hours  midodrine. 20 milliGRAM(s) Oral three times a day  nystatin    Suspension [id number] Unit(s) Oral four times a day  nystatin Cream 1 Application(s) Topical two times a day  pantoprazole    Tablet 40 milliGRAM(s) Oral before breakfast  rifAXIMin 550 milliGRAM(s) Oral two times a day  silver sulfADIAZINE 1% Cream 1 Application(s) Topical two times a day  tamsulosin 0.4 milliGRAM(s) Oral at bedtime  trimethoprim  160 mG/sulfamethoxazole 800 mG 1 Tablet(s) Oral daily    MEDICATIONS  (PRN):  dextrose 40% Gel 15 Gram(s) Oral once PRN Blood Glucose LESS THAN 70 milliGRAM(s)/deciliter  glucagon  Injectable 1 milliGRAM(s) IntraMuscular once PRN Glucose LESS THAN 70 milligrams/deciliter  traMADol 25 milliGRAM(s) Oral two times a day PRN Moderate Pain (4 - 6)

## 2020-06-15 LAB
-  AMPICILLIN: SIGNIFICANT CHANGE UP
-  CIPROFLOXACIN: SIGNIFICANT CHANGE UP
-  DAPTOMYCIN: SIGNIFICANT CHANGE UP
-  LEVOFLOXACIN: SIGNIFICANT CHANGE UP
-  LINEZOLID: SIGNIFICANT CHANGE UP
-  NITROFURANTOIN: SIGNIFICANT CHANGE UP
-  TETRACYCLINE: SIGNIFICANT CHANGE UP
-  VANCOMYCIN: SIGNIFICANT CHANGE UP
ALBUMIN SERPL ELPH-MCNC: 3.4 G/DL — SIGNIFICANT CHANGE UP (ref 3.3–5)
ALP SERPL-CCNC: 129 U/L — HIGH (ref 40–120)
ALT FLD-CCNC: 18 U/L — SIGNIFICANT CHANGE UP (ref 10–45)
ANION GAP SERPL CALC-SCNC: 10 MMOL/L — SIGNIFICANT CHANGE UP (ref 5–17)
AST SERPL-CCNC: 24 U/L — SIGNIFICANT CHANGE UP (ref 10–40)
BILIRUB SERPL-MCNC: 10.6 MG/DL — HIGH (ref 0.2–1.2)
BUN SERPL-MCNC: 32 MG/DL — HIGH (ref 7–23)
CALCIUM SERPL-MCNC: 10.1 MG/DL — SIGNIFICANT CHANGE UP (ref 8.4–10.5)
CHLORIDE SERPL-SCNC: 102 MMOL/L — SIGNIFICANT CHANGE UP (ref 96–108)
CO2 SERPL-SCNC: 21 MMOL/L — LOW (ref 22–31)
CREAT SERPL-MCNC: 1.68 MG/DL — HIGH (ref 0.5–1.3)
CULTURE RESULTS: SIGNIFICANT CHANGE UP
GLUCOSE SERPL-MCNC: 209 MG/DL — HIGH (ref 70–99)
HCT VFR BLD CALC: 24.1 % — LOW (ref 39–50)
HGB BLD-MCNC: 8.3 G/DL — LOW (ref 13–17)
INR BLD: 2.17 RATIO — HIGH (ref 0.88–1.16)
MCHC RBC-ENTMCNC: 34.4 GM/DL — SIGNIFICANT CHANGE UP (ref 32–36)
MCHC RBC-ENTMCNC: 36.6 PG — HIGH (ref 27–34)
MCV RBC AUTO: 106.2 FL — HIGH (ref 80–100)
METHOD TYPE: SIGNIFICANT CHANGE UP
NRBC # BLD: 0 /100 WBCS — SIGNIFICANT CHANGE UP (ref 0–0)
ORGANISM # SPEC MICROSCOPIC CNT: SIGNIFICANT CHANGE UP
ORGANISM # SPEC MICROSCOPIC CNT: SIGNIFICANT CHANGE UP
PLATELET # BLD AUTO: 63 K/UL — LOW (ref 150–400)
POTASSIUM SERPL-MCNC: 5.5 MMOL/L — HIGH (ref 3.5–5.3)
POTASSIUM SERPL-SCNC: 5.5 MMOL/L — HIGH (ref 3.5–5.3)
PROT SERPL-MCNC: 6.2 G/DL — SIGNIFICANT CHANGE UP (ref 6–8.3)
PROTHROM AB SERPL-ACNC: 25.1 SEC — HIGH (ref 10–13.1)
RBC # BLD: 2.27 M/UL — LOW (ref 4.2–5.8)
RBC # FLD: 18 % — HIGH (ref 10.3–14.5)
SODIUM SERPL-SCNC: 133 MMOL/L — LOW (ref 135–145)
SPECIMEN SOURCE: SIGNIFICANT CHANGE UP
WBC # BLD: 5.17 K/UL — SIGNIFICANT CHANGE UP (ref 3.8–10.5)
WBC # FLD AUTO: 5.17 K/UL — SIGNIFICANT CHANGE UP (ref 3.8–10.5)

## 2020-06-15 PROCEDURE — 99233 SBSQ HOSP IP/OBS HIGH 50: CPT | Mod: GC

## 2020-06-15 PROCEDURE — 93306 TTE W/DOPPLER COMPLETE: CPT | Mod: 26

## 2020-06-15 PROCEDURE — 99232 SBSQ HOSP IP/OBS MODERATE 35: CPT

## 2020-06-15 PROCEDURE — 99233 SBSQ HOSP IP/OBS HIGH 50: CPT

## 2020-06-15 PROCEDURE — 74018 RADEX ABDOMEN 1 VIEW: CPT | Mod: 26

## 2020-06-15 RX ORDER — ALBUMIN HUMAN 25 %
100 VIAL (ML) INTRAVENOUS EVERY 6 HOURS
Refills: 0 | Status: COMPLETED | OUTPATIENT
Start: 2020-06-15 | End: 2020-06-16

## 2020-06-15 RX ORDER — SODIUM ZIRCONIUM CYCLOSILICATE 10 G/10G
10 POWDER, FOR SUSPENSION ORAL ONCE
Refills: 0 | Status: COMPLETED | OUTPATIENT
Start: 2020-06-15 | End: 2020-06-16

## 2020-06-15 RX ORDER — LINEZOLID 600 MG/300ML
600 INJECTION, SOLUTION INTRAVENOUS EVERY 12 HOURS
Refills: 0 | Status: COMPLETED | OUTPATIENT
Start: 2020-06-15 | End: 2020-06-22

## 2020-06-15 RX ADMIN — LINEZOLID 600 MILLIGRAM(S): 600 INJECTION, SOLUTION INTRAVENOUS at 18:57

## 2020-06-15 RX ADMIN — Medication 500000 UNIT(S): at 13:27

## 2020-06-15 RX ADMIN — Medication 3: at 13:29

## 2020-06-15 RX ADMIN — Medication 1: at 18:36

## 2020-06-15 RX ADMIN — URSODIOL 300 MILLIGRAM(S): 250 TABLET, FILM COATED ORAL at 06:37

## 2020-06-15 RX ADMIN — Medication 500000 UNIT(S): at 00:28

## 2020-06-15 RX ADMIN — Medication 500000 UNIT(S): at 18:39

## 2020-06-15 RX ADMIN — Medication 3 UNIT(S): at 18:36

## 2020-06-15 RX ADMIN — NYSTATIN CREAM 1 APPLICATION(S): 100000 CREAM TOPICAL at 18:38

## 2020-06-15 RX ADMIN — Medication 1 MILLIGRAM(S): at 13:27

## 2020-06-15 RX ADMIN — MEROPENEM 100 MILLIGRAM(S): 1 INJECTION INTRAVENOUS at 18:40

## 2020-06-15 RX ADMIN — PANTOPRAZOLE SODIUM 40 MILLIGRAM(S): 20 TABLET, DELAYED RELEASE ORAL at 06:37

## 2020-06-15 RX ADMIN — Medication 1 APPLICATION(S): at 06:38

## 2020-06-15 RX ADMIN — LACTULOSE 30 GRAM(S): 10 SOLUTION ORAL at 06:37

## 2020-06-15 RX ADMIN — Medication 500000 UNIT(S): at 06:37

## 2020-06-15 RX ADMIN — Medication 1 APPLICATION(S): at 18:38

## 2020-06-15 RX ADMIN — URSODIOL 300 MILLIGRAM(S): 250 TABLET, FILM COATED ORAL at 18:39

## 2020-06-15 RX ADMIN — Medication 50 MILLILITER(S): at 20:10

## 2020-06-15 RX ADMIN — LACTULOSE 30 GRAM(S): 10 SOLUTION ORAL at 18:39

## 2020-06-15 RX ADMIN — Medication 3 UNIT(S): at 13:29

## 2020-06-15 RX ADMIN — MIDODRINE HYDROCHLORIDE 20 MILLIGRAM(S): 2.5 TABLET ORAL at 13:27

## 2020-06-15 RX ADMIN — Medication 500000 UNIT(S): at 22:57

## 2020-06-15 RX ADMIN — INSULIN GLARGINE 4 UNIT(S): 100 INJECTION, SOLUTION SUBCUTANEOUS at 22:56

## 2020-06-15 RX ADMIN — LACTULOSE 30 GRAM(S): 10 SOLUTION ORAL at 22:57

## 2020-06-15 RX ADMIN — LACTULOSE 30 GRAM(S): 10 SOLUTION ORAL at 00:28

## 2020-06-15 RX ADMIN — MEROPENEM 100 MILLIGRAM(S): 1 INJECTION INTRAVENOUS at 06:38

## 2020-06-15 RX ADMIN — TAMSULOSIN HYDROCHLORIDE 0.4 MILLIGRAM(S): 0.4 CAPSULE ORAL at 22:56

## 2020-06-15 RX ADMIN — NYSTATIN CREAM 1 APPLICATION(S): 100000 CREAM TOPICAL at 06:38

## 2020-06-15 RX ADMIN — Medication 1 TABLET(S): at 13:27

## 2020-06-15 RX ADMIN — MIDODRINE HYDROCHLORIDE 20 MILLIGRAM(S): 2.5 TABLET ORAL at 06:37

## 2020-06-15 RX ADMIN — MIDODRINE HYDROCHLORIDE 20 MILLIGRAM(S): 2.5 TABLET ORAL at 18:38

## 2020-06-15 RX ADMIN — LACTULOSE 30 GRAM(S): 10 SOLUTION ORAL at 13:26

## 2020-06-15 NOTE — PROGRESS NOTE ADULT - ATTENDING COMMENTS
63 y/o man cirrhosis due to JEAN complicated by ascites, history of SBP and encephalopathy. Admitted for worsening encephalopathy consulted for worsening MISA. Baseline creatinine 1mg/dL. Cr peak 1.75mg/dL on 6/13/ Diuretics (Lasix and aldactone) held and he was started on albumin and midodrine. 's, UOP 1.5 liters.   On exam icteric, drowsy but arousable, + asterixes, abdomen distended but not tense, non tender, no extremity edema. Beasley in place.     Cr 1.68mg/dL stable.     Recommend   Continue midodrine   Give albumin 5% q 6 hours x 4 doses   Continue to hold diuretics   Daily BMP   Will continue to follow

## 2020-06-15 NOTE — PROGRESS NOTE ADULT - SUBJECTIVE AND OBJECTIVE BOX
Patient is a 64y old  Male who presents with a chief complaint of Liver failure, hepatic encephalopathy (15 Rachid 2020 11:34)      SUBJECTIVE / OVERNIGHT EVENTS:  no acute events overnight, vss, afebrile  pt more confused this morning, unable answer appropriately  as per RN, no BMs since yesterday despite lactulose + rifaximin    ROS:  unable to obtain 2/2 mental status    MEDICATIONS  (STANDING):  dextrose 5%. 1000 milliLiter(s) (50 mL/Hr) IV Continuous <Continuous>  dextrose 50% Injectable 12.5 Gram(s) IV Push once  dextrose 50% Injectable 25 Gram(s) IV Push once  dextrose 50% Injectable 25 Gram(s) IV Push once  folic acid 1 milliGRAM(s) Oral daily  insulin glargine Injectable (LANTUS) 4 Unit(s) SubCutaneous at bedtime  insulin lispro (HumaLOG) corrective regimen sliding scale   SubCutaneous three times a day before meals  insulin lispro (HumaLOG) corrective regimen sliding scale   SubCutaneous at bedtime  insulin lispro Injectable (HumaLOG) 3 Unit(s) SubCutaneous three times a day before meals  lactulose Syrup 30 Gram(s) Oral every 6 hours  meropenem  IVPB      meropenem  IVPB 1000 milliGRAM(s) IV Intermittent every 12 hours  midodrine. 20 milliGRAM(s) Oral three times a day  nystatin    Suspension [id number] Unit(s) Oral four times a day  nystatin Cream 1 Application(s) Topical two times a day  pantoprazole    Tablet 40 milliGRAM(s) Oral before breakfast  rifAXIMin 550 milliGRAM(s) Oral two times a day  silver sulfADIAZINE 1% Cream 1 Application(s) Topical two times a day  sodium zirconium cyclosilicate 10 Gram(s) Oral once  tamsulosin 0.4 milliGRAM(s) Oral at bedtime  trimethoprim  160 mG/sulfamethoxazole 800 mG 1 Tablet(s) Oral daily  ursodiol Capsule 300 milliGRAM(s) Oral two times a day    MEDICATIONS  (PRN):  dextrose 40% Gel 15 Gram(s) Oral once PRN Blood Glucose LESS THAN 70 milliGRAM(s)/deciliter  glucagon  Injectable 1 milliGRAM(s) IntraMuscular once PRN Glucose LESS THAN 70 milligrams/deciliter  traMADol 25 milliGRAM(s) Oral two times a day PRN Moderate Pain (4 - 6)      CAPILLARY BLOOD GLUCOSE      POCT Blood Glucose.: 226 mg/dL (14 Jun 2020 21:05)  POCT Blood Glucose.: 196 mg/dL (14 Jun 2020 16:54)  POCT Blood Glucose.: 175 mg/dL (14 Jun 2020 13:00)    I&O's Summary    14 Jun 2020 07:01  -  15 Rachid 2020 07:00  --------------------------------------------------------  IN: 120 mL / OUT: 1500 mL / NET: -1380 mL        PHYSICAL EXAM:  Vital Signs Last 24 Hrs  T(C): 36.7 (15 Rachid 2020 07:22), Max: 36.9 (14 Jun 2020 16:47)  T(F): 98.1 (15 Rachid 2020 07:22), Max: 98.4 (14 Jun 2020 16:47)  HR: 80 (15 Rachid 2020 07:22) (80 - 88)  BP: 114/66 (15 Rachid 2020 07:22) (110/63 - 126/70)  BP(mean): --  RR: 18 (15 Rachid 2020 07:22) (18 - 18)  SpO2: 100% (15 Rachid 2020 07:22) (95% - 100%)    GENERAL: NAD, well-developed  HEAD:  Atraumatic, Normocephalic  EYES: EOMI, PERRLA, (+) scleral icterus  NECK: Supple, No JVD  CHEST/LUNG: Clear to auscultation bilaterally; No wheeze  HEART: Regular rate and rhythm; No murmurs, rubs, or gallops  ABDOMEN: Soft, Nontender, Nondistended; Bowel sounds present  EXTREMITIES:  2+ Peripheral Pulses, No clubbing, cyanosis, or edema  NEUROLOGY: AAOx1; non-focal  SKIN: (+) jaundice    LABS:  personally reviewed                        8.3    5.17  )-----------( 63       ( 15 Rachid 2020 05:47 )             24.1     06-15    133<L>  |  102  |  32<H>  ----------------------------<  209<H>  5.5<H>   |  21<L>  |  1.68<H>    Ca    10.1      15 Rachid 2020 05:47    TPro  6.2  /  Alb  3.4  /  TBili  10.6<H>  /  DBili  x   /  AST  24  /  ALT  18  /  AlkPhos  129<H>  06-15    PT/INR - ( 15 Rachid 2020 08:13 )   PT: 25.1 sec;   INR: 2.17 ratio         PTT - ( 14 Jun 2020 09:19 )  PTT:44.2 sec          RADIOLOGY & ADDITIONAL TESTS:    Imaging Personally Reviewed:    Consultant(s) Notes Reviewed:  hepatology, ID    Care Discussed with Consultants/Other Providers: Dr. Pérez (ID)

## 2020-06-15 NOTE — PROGRESS NOTE ADULT - SUBJECTIVE AND OBJECTIVE BOX
Follow Up:      Interval History:    REVIEW OF SYSTEMS  [  ] ROS unobtainable because:    [  ] All other systems negative except as noted below    Constitutional:  [ ] fever [ ] chills  [ ] weight loss  [ ] weakness  Skin:  [ ] rash [ ] phlebitis	  Eyes: [ ] icterus [ ] pain  [ ] discharge	  ENMT: [ ] sore throat  [ ] thrush [ ] ulcers [ ] exudates  Respiratory: [ ] dyspnea [ ] hemoptysis [ ] cough [ ] sputum	  Cardiovascular:  [ ] chest pain [ ] palpitations [ ] edema	  Gastrointestinal:  [ ] nausea [ ] vomiting [ ] diarrhea [ ] constipation [ ] pain	  Genitourinary:  [ ] dysuria [ ] frequency [ ] hematuria [ ] discharge [ ] flank pain  [ ] incontinence  Musculoskeletal:  [ ] myalgias [ ] arthralgias [ ] arthritis  [ ] back pain  Neurological:  [ ] headache [ ] seizures  [ ] confusion/altered mental status    Allergies  codeine (Anaphylaxis)        ANTIMICROBIALS:  linezolid    Tablet 600 every 12 hours  meropenem  IVPB    meropenem  IVPB 1000 every 12 hours  nystatin    Suspension 215722 four times a day  rifAXIMin 550 two times a day  trimethoprim  160 mG/sulfamethoxazole 800 mG 1 daily      OTHER MEDS:  MEDICATIONS  (STANDING):  dextrose 40% Gel 15 once PRN  dextrose 50% Injectable 12.5 once  dextrose 50% Injectable 25 once  dextrose 50% Injectable 25 once  glucagon  Injectable 1 once PRN  insulin glargine Injectable (LANTUS) 4 at bedtime  insulin lispro (HumaLOG) corrective regimen sliding scale  three times a day before meals  insulin lispro (HumaLOG) corrective regimen sliding scale  at bedtime  insulin lispro Injectable (HumaLOG) 3 three times a day before meals  lactulose Syrup 30 every 6 hours  midodrine. 20 three times a day  pantoprazole    Tablet 40 before breakfast  tamsulosin 0.4 at bedtime  traMADol 25 two times a day PRN  ursodiol Capsule 300 two times a day      Vital Signs Last 24 Hrs  T(C): 36.9 (15 Rachid 2020 15:44), Max: 36.9 (14 Jun 2020 16:47)  T(F): 98.4 (15 Rachid 2020 15:44), Max: 98.4 (14 Jun 2020 16:47)  HR: 81 (15 Rachid 2020 15:44) (80 - 88)  BP: 112/64 (15 Rachid 2020 15:44) (110/63 - 126/70)  BP(mean): --  RR: 18 (15 Rachid 2020 15:44) (18 - 18)  SpO2: 100% (15 Rachid 2020 15:44) (95% - 100%)    PHYSICAL EXAMINATION:  General: Awake but only intermittently alert, NAD, Jaundiced  HEENT: PERRL, EOMI, Scleral Icterus  Neck: Supple  Cardiac: RRR, No M/R/G  Resp: CTAB, No Wh/Rh/Ra  Abdomen: NBS, NT/ND, No HSM, No rigidity or guarding  MSK: 1-2+ LE edema. No Calf tenderness  : + bob  Skin: No rashes or lesions. Skin is warm and dry to the touch.   Neuro: Awake but only intermittently alert. CN 2-12 Grossly intact. Moves all four extremities spontaneously.  Psych: Encephalopathic - unable to assess                          8.3    5.17  )-----------( 63       ( 15 Rachid 2020 05:47 )             24.1       06-15    133<L>  |  102  |  32<H>  ----------------------------<  209<H>  5.5<H>   |  21<L>  |  1.68<H>    Ca    10.1      15 Rachid 2020 05:47    TPro  6.2  /  Alb  3.4  /  TBili  10.6<H>  /  DBili  x   /  AST  24  /  ALT  18  /  AlkPhos  129<H>  06-15          MICROBIOLOGY:    .Urine Catheterized  06-14-20   >100,000 CFU/ml Gram positive organisms  --  --      .Urine Clean Catch (Midstream)  06-14-20   >100,000 CFU/ml Gram positive organisms  --  --      .Blood Blood-Peripheral  06-12-20   No growth to date.  --  --        CMV IgG Antibody: <0.20 U/mL (12-04-19 @ 08:33)  Toxoplasma IgG Screen: <3.0 IU/mL (12-04-19 @ 08:33)          RADIOLOGY:    <The imaging below has been reviewed and visualized by me independently. Findings as detailed in report below>    EXAM:  CT PELVIS ONLY                        PROCEDURE DATE:  06/12/2020    Evaluation without intravenous contrast.   No prostate abscess noted within the limitations of the exam. Follow Up:  pre-LT, Encephalopathy    Interval History: more confused today. notes that he "doesnt feel quite right" denies pain or urinary symptoms.     REVIEW OF SYSTEMS  [  ] ROS unobtainable because:    [ x ] All other systems negative except as noted below    Constitutional:  [ ] fever [ ] chills  [ ] weight loss  [ ] weakness  Skin:  [ ] rash [ ] phlebitis	  Eyes: [ ] icterus [ ] pain  [ ] discharge	  ENMT: [ ] sore throat  [ ] thrush [ ] ulcers [ ] exudates  Respiratory: [ ] dyspnea [ ] hemoptysis [ ] cough [ ] sputum	  Cardiovascular:  [ ] chest pain [ ] palpitations [ ] edema	  Gastrointestinal:  [ ] nausea [ ] vomiting [ ] diarrhea [ ] constipation [ ] pain	  Genitourinary:  [ ] dysuria [ ] frequency [ ] hematuria [ ] discharge [ ] flank pain  [ ] incontinence  Musculoskeletal:  [ ] myalgias [ ] arthralgias [ ] arthritis  [ ] back pain  Neurological:  [ ] headache [ ] seizures  [ x] confusion/altered mental status    Allergies  codeine (Anaphylaxis)        ANTIMICROBIALS:  linezolid    Tablet 600 every 12 hours  meropenem  IVPB    meropenem  IVPB 1000 every 12 hours  nystatin    Suspension [id number] four times a day  rifAXIMin 550 two times a day  trimethoprim  160 mG/sulfamethoxazole 800 mG 1 daily      OTHER MEDS:  MEDICATIONS  (STANDING):  dextrose 40% Gel 15 once PRN  dextrose 50% Injectable 12.5 once  dextrose 50% Injectable 25 once  dextrose 50% Injectable 25 once  glucagon  Injectable 1 once PRN  insulin glargine Injectable (LANTUS) 4 at bedtime  insulin lispro (HumaLOG) corrective regimen sliding scale  three times a day before meals  insulin lispro (HumaLOG) corrective regimen sliding scale  at bedtime  insulin lispro Injectable (HumaLOG) 3 three times a day before meals  lactulose Syrup 30 every 6 hours  midodrine. 20 three times a day  pantoprazole    Tablet 40 before breakfast  tamsulosin 0.4 at bedtime  traMADol 25 two times a day PRN  ursodiol Capsule 300 two times a day      Vital Signs Last 24 Hrs  T(C): 36.9 (15 Rachid 2020 15:44), Max: 36.9 (14 Jun 2020 16:47)  T(F): 98.4 (15 Rachid 2020 15:44), Max: 98.4 (14 Jun 2020 16:47)  HR: 81 (15 Rachid 2020 15:44) (80 - 88)  BP: 112/64 (15 Rachid 2020 15:44) (110/63 - 126/70)  BP(mean): --  RR: 18 (15 Rachid 2020 15:44) (18 - 18)  SpO2: 100% (15 Rachid 2020 15:44) (95% - 100%)    PHYSICAL EXAMINATION:  General: Awake but only intermittently alert, NAD, Jaundiced  HEENT: PERRL, EOMI, Scleral Icterus  Neck: Supple  Cardiac: RRR, No M/R/G  Resp: CTAB, No Wh/Rh/Ra  Abdomen: NBS, NT/ND, No HSM, No rigidity or guarding  MSK: 1-2+ LE edema. No Calf tenderness  : + bob  Skin: No rashes or lesions. Skin is warm and dry to the touch.   Neuro: Awake but only intermittently alert. CN 2-12 Grossly intact. Moves all four extremities spontaneously.  Psych: Encephalopathic - unable to assess                          8.3    5.17  )-----------( 63       ( 15 Rachid 2020 05:47 )             24.1       06-15    133<L>  |  102  |  32<H>  ----------------------------<  209<H>  5.5<H>   |  21<L>  |  1.68<H>    Ca    10.1      15 Rachid 2020 05:47    TPro  6.2  /  Alb  3.4  /  TBili  10.6<H>  /  DBili  x   /  AST  24  /  ALT  18  /  AlkPhos  129<H>  06-15          MICROBIOLOGY:    .Urine Catheterized  06-14-20   >100,000 CFU/ml Gram positive organisms  --  --      .Urine Clean Catch (Midstream)  06-14-20   >100,000 CFU/ml Gram positive organisms  --  --      .Blood Blood-Peripheral  06-12-20   No growth to date.  --  --        CMV IgG Antibody: <0.20 U/mL (12-04-19 @ 08:33)  Toxoplasma IgG Screen: <3.0 IU/mL (12-04-19 @ 08:33)          RADIOLOGY:    <The imaging below has been reviewed and visualized by me independently. Findings as detailed in report below>    EXAM:  CT PELVIS ONLY                        PROCEDURE DATE:  06/12/2020    Evaluation without intravenous contrast.   No prostate abscess noted within the limitations of the exam.

## 2020-06-15 NOTE — PROGRESS NOTE ADULT - ATTENDING COMMENTS
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 He is re-admitted with hepatic encephalopathy and concern for recurrent UTI, also with MISA on CKD.    # Complicated UTI: Urine cultures with >100k gram positive organisms, most likely VRE given prior culture results. Blood cultures NGTD. Transplant ID appreciated, and started on linezolid today. Can discontinue Bactrim while on IV antibiotics but, once acute infection resolved, will need to re-address choice of prophylactic antibiotic with Transplant ID.    # Hepatic encephalopathy: Has [place] grade 3 HE today. No BMs in past 24h despite lactulose, but abdomen with normoactive bowel sounds and only mildly distended, with no ileus or obstruction based on abdominal XR done today. Recommend increasing lactulose to q2h until 4 BMs, then can decrease frequency and adjust based on clinical response (though may initially need >4-6 BMs/day until HE resolves). Add Miralax 17g po bid. Continue rifaximin 550 mg po bid.    # MISA on CKD: Transplant Nephrology input appreciated. Agree with recommendation for IV albumin (especially given anticipated stool losses from aggressive lactulose, as above). Hold diuretics for now.    # Orthostatic hypotension: Chronic, related to end-stage liver disease. Continue home midodrine.    # Hyponatremia: Mild. Continue fluid restriction.    # Chronic anemia secondary to chronic GI blood loss: No recent overt bleeding. Hb/HCT stable compared to his baseline.    # Decompensated JEAN cirrhosis: He is currently UNOS wait-listed for liver transplantation at [facility], blood type A, with MELD-Na 30 today. However, will need resolution of active infection prior to proceeding with transplantation.    Please don't hesitate to call with any questions/concerns.    Letitia Mo M.D., Ph.D.  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 He is re-admitted with hepatic encephalopathy and concern for recurrent UTI, also with MISA on CKD.    # Complicated UTI: Urine cultures with >100k gram positive organisms, most likely VRE given prior culture results. Blood cultures NGTD. Transplant ID appreciated, and started on linezolid today. Can discontinue Bactrim while on meropenem but should be resumed if meropenem discontinued (as per Transplant ID) for SBP prophylaxis including coverage of prior ESBL E. coli organism.    # Hepatic encephalopathy: Has [place] grade 3 HE today. No BMs in past 24h despite lactulose, but abdomen with normoactive bowel sounds and only mildly distended, with no ileus or obstruction based on abdominal XR done today. Recommend increasing lactulose to q2h until 4 BMs, then can decrease frequency and adjust based on clinical response (though may initially need >4-6 BMs/day until HE resolves). Add Miralax 17g po bid. Continue rifaximin 550 mg po bid.    # MISA on CKD: Transplant Nephrology input appreciated. Agree with recommendation for IV albumin (especially given anticipated stool losses from aggressive lactulose, as above). Hold diuretics for now.    # Orthostatic hypotension: Chronic, related to end-stage liver disease. Continue home midodrine.    # Hyponatremia: Mild. Continue fluid restriction.    # Chronic anemia secondary to chronic GI blood loss: No recent overt bleeding. Hb/HCT stable compared to his baseline.    # Decompensated JAEN cirrhosis: He is currently UNOS wait-listed for liver transplantation at [facility], blood type A, with MELD-Na 30 today. However, will need resolution of active infection prior to proceeding with transplantation.    Please don't hesitate to call with any questions/concerns.    Letitia Mo M.D., Ph.D.  Transplant Hepatology  Cell: [phone]

## 2020-06-15 NOTE — PROGRESS NOTE ADULT - ASSESSMENT
Pt with MISA likely 2/2 pre renal and/or ATN in setting hypotension, less likely HRS (Concepcion>35, no response tx)    #Acute Kidney Injury  - MISA likely 2/2 pre renal ATN in setting hypotension, decrease PO intake, need rule out obstruction, less likely HRS (Concepcion>35, no response tx)  - Urine Na 112 ~ ATN, UPro/Cr 0.2 gram, UA showed UTI w/o protein/rbc.    - Baseline sCr 1.0 in 5/2020, on admission sCr 1.57 (6/11), uptrend to 1.75 on 6/13. Patient with SCr: 1.6 today (6/15/20)  - Borderline hypotension during hospitalization (on midodrine), no response to albumin/midodrine treated, less likely HRS given Concepcion>35.  Good UOP during hospitalization, lasix/aldactone held.    - Prior MISA w/u Dec 2019 showed FLC elevated K/L ratio 2.91 (K>L) and immunofixation showing "one Weak IgA Lambda  Band and One Weak IgG Lambda Band Identified"  - Recommend Give Albumin 5% every 6 hours x4 doses   - HOLD diuretics  - No absolute indication for dialysis, no fluid overload no refractory electrolyte derangement   - Avoid nephrotoxin meds such as NSAIDS, PPI, ACEI/ARB, and contrast agents.  - Renally dose medications per GFR.    Kia Mendoza  Nephrology Fellow  Pager: [phone]

## 2020-06-15 NOTE — PROGRESS NOTE ADULT - ASSESSMENT
64M w/ HFpEF, decompensated JEAN cirrhosis w/ hx of ascites + SBP, GI bleed 2/2 duodenal ulcers, angioectasias, and GAVE, ESBL E. Coli prostatic abscess s/p 4 weeks IV ertapenem, hx of ESBL UTIs and VRE colonization, presening w/ jaundice and confusion.    Impression:   #Cirrhosis 2/2 JEAN, decompensated by ascites; listed for liver transplant  -varices: small on last EGD 12/2019, not on BB  -ascites: trace on U/S this admission  -HCC: neg on U/S this admission  -HE: present on exam, on lactulose + rifaximin at home  -MELD-Na 32 on 6/14  #Anemia w/o overt GI bleeding, Hb pollo 6.9 responding to transfusion  #R posterior heel wound w/ overlying eschar – no signs of infections, XR neg for gas, evaluated by podiatry  #ESBL UTI hx w/ hx of prostatic abscess on IV abx  #VRE colonization    Recommendation:   - f/u transplant ID recs re: antibiotics  - c/w lactulose 30 q6h, rifaximin  - trend Hb, transfuse to Hb > 7  - trend CMP, CBC, INR daily  - no indication for ursodiol  - c/w home midodrine  - f/u renal recs  - continue to hold home diuretics for now  - low Na diet    Ze Dominguez  Gastroenterology Fellow  Available via [employer]  GI Phone# [phone] ([facility])  Page on-call GI fellow via  from 5pm-8am and on weekends 64M w/ HFpEF, decompensated JEAN cirrhosis w/ hx of ascites + SBP, GI bleed 2/2 duodenal ulcers, angioectasias, and GAVE, ESBL E. Coli prostatic abscess s/p 4 weeks IV ertapenem, hx of ESBL UTIs and VRE colonization, presening w/ jaundice and confusion.    Impression:   #Cirrhosis 2/2 JEAN, decompensated by ascites; listed for liver transplant  -varices: small on last EGD 12/2019, not on BB  -ascites: trace on U/S this admission  -HCC: neg on U/S this admission  -HE: present on exam, on lactulose + rifaximin at home  -MELD-Na 32 on 6/14  #Anemia w/o overt GI bleeding, Hb pollo 6.9 responding to transfusion  #R posterior heel wound w/ overlying eschar – no signs of infections, XR neg for gas, evaluated by podiatry  #ESBL UTI hx w/ hx of prostatic abscess on IV abx  #VRE colonization    Recommendation:   - would check AXR to r/o obstruction/ileus, if negative would increase lactulose to 30mg q4h and add BID miralax to help w/ bowel movements  - c/w rifaximin  - f/u transplant ID recs re: antibiotics  - trend Hb, transfuse to Hb > 7  - trend CMP, CBC, INR daily  - no indication for ursodiol  - c/w home midodrine  - f/u renal recs  - continue to hold home diuretics for now  - low Na diet    Ze Dominguez  Gastroenterology Fellow  Available via [employer]  GI Phone# [phone] ([facility])  Page on-call GI fellow via  from 5pm-8am and on weekends 64M w/ HFpEF, decompensated JEAN cirrhosis w/ hx of ascites + SBP, GI bleed 2/2 duodenal ulcers, angioectasias, and GAVE, ESBL E. Coli prostatic abscess s/p 4 weeks IV ertapenem, hx of ESBL UTIs and VRE colonization, presening w/ jaundice and confusion.    Impression:   #Cirrhosis 2/2 JEAN, decompensated by ascites; listed for liver transplant  -varices: small on last EGD 12/2019, not on BB  -ascites: trace on U/S this admission  -HCC: neg on U/S this admission  -HE: present on exam, on lactulose + rifaximin at home  -MELD-Na 32 on 6/14  #Anemia w/o overt GI bleeding, Hb pollo 6.9 responding to transfusion  #R posterior heel wound w/ overlying eschar – no signs of infections, XR neg for gas, evaluated by podiatry  #ESBL UTI hx w/ hx of prostatic abscess on IV abx  #VRE colonization    Recommendation:   - would check AXR to r/o obstruction/ileus, if negative would increase lactulose to 30mg q2-4h until at least 4 BMs and add BID miralax to help w/ bowel movements  - c/w rifaximin  - f/u transplant ID recs re: antibiotics  - trend Hb, transfuse to Hb > 7  - trend CMP, CBC, INR daily  - can discontinue ursodiol  - hold diuretics and start IV albumin as per Renal recs  - continue home midodrine therapy  - low Na diet    Ze Dominguez  Gastroenterology Fellow  Available via [employer]  GI Phone# [phone] ([facility])  Page on-call GI fellow via  from 5pm-8am and on weekends

## 2020-06-15 NOTE — PROGRESS NOTE ADULT - PROBLEM SELECTOR PLAN 2
Reported confusion and altered behavior at home. On admission, his mental status improved back to baseline but this morning, AAOx1 and confused  - as per RN, no BM since yesterday  - increase lactulose to 30mg 16hr  - continue rifaximin 550 BID  - enema once  - monitor mental status closely  - check ammonia level

## 2020-06-15 NOTE — PROGRESS NOTE ADULT - ASSESSMENT
64 M hx of DM, GERD, HFpEF with mild LV diastolic dysfunction, and decompensated JEAN cirrhosis c/b ascites with hx of SBP, HE, duodenal ulcer, GAVE and duodenal AVM s/p APC, recent admission for ESBL E coli prostatic abscess 2/2 4 wks ertapenem, hx of ESBL UTIs, recent VRE urinary colonization, admitted for acute hepatic encephalopathy likely in setting of UTI

## 2020-06-15 NOTE — PROGRESS NOTE ADULT - ATTENDING COMMENTS
updated wife Ada over the phone    Cary Rivera MD  Division of Hospital Medicine  Cell: [phone]  Pager: [phone]  Office: [phone]

## 2020-06-15 NOTE — PROGRESS NOTE ADULT - PROBLEM SELECTOR PLAN 1
No symptoms, possible chronic colonization. Has been on Meropenem  - care discussed with Dr. Pérez  - UCx with gram positive, follow up speciation/sensitivity  - CTAP on this admission without prostate abscess

## 2020-06-15 NOTE — PROGRESS NOTE ADULT - SUBJECTIVE AND OBJECTIVE BOX
Chief Complaint:  Patient is a 64y old  Male who presents with a chief complaint of Liver failure, hepatic encephalopathy (14 Jun 2020 12:30)    Reason for consult: Hepatic encephalopathy    Interval Events: Pt confused this AM    Hospital Medications:  dextrose 40% Gel 15 Gram(s) Oral once PRN  dextrose 5%. 1000 milliLiter(s) IV Continuous <Continuous>  dextrose 50% Injectable 12.5 Gram(s) IV Push once  dextrose 50% Injectable 25 Gram(s) IV Push once  dextrose 50% Injectable 25 Gram(s) IV Push once  folic acid 1 milliGRAM(s) Oral daily  glucagon  Injectable 1 milliGRAM(s) IntraMuscular once PRN  insulin glargine Injectable (LANTUS) 4 Unit(s) SubCutaneous at bedtime  insulin lispro (HumaLOG) corrective regimen sliding scale   SubCutaneous three times a day before meals  insulin lispro (HumaLOG) corrective regimen sliding scale   SubCutaneous at bedtime  insulin lispro Injectable (HumaLOG) 3 Unit(s) SubCutaneous three times a day before meals  lactulose Syrup 30 Gram(s) Oral every 6 hours  meropenem  IVPB      meropenem  IVPB 1000 milliGRAM(s) IV Intermittent every 12 hours  midodrine. 20 milliGRAM(s) Oral three times a day  nystatin    Suspension 629199 Unit(s) Oral four times a day  nystatin Cream 1 Application(s) Topical two times a day  pantoprazole    Tablet 40 milliGRAM(s) Oral before breakfast  rifAXIMin 550 milliGRAM(s) Oral two times a day  silver sulfADIAZINE 1% Cream 1 Application(s) Topical two times a day  tamsulosin 0.4 milliGRAM(s) Oral at bedtime  traMADol 25 milliGRAM(s) Oral two times a day PRN  trimethoprim  160 mG/sulfamethoxazole 800 mG 1 Tablet(s) Oral daily  ursodiol Capsule 300 milliGRAM(s) Oral two times a day      ROS: pt unable to provide    PHYSICAL EXAM:   Vital Signs:  Vital Signs Last 24 Hrs  T(C): 36.7 (15 Rachid 2020 07:22), Max: 36.9 (14 Jun 2020 16:47)  T(F): 98.1 (15 Rachid 2020 07:22), Max: 98.4 (14 Jun 2020 16:47)  HR: 80 (15 Rachid 2020 07:22) (80 - 88)  BP: 114/66 (15 Rachid 2020 07:22) (110/63 - 126/70)  BP(mean): --  RR: 18 (15 Rachid 2020 07:22) (18 - 18)  SpO2: 100% (15 Rachid 2020 07:22) (95% - 100%)  Daily     Daily     GENERAL: no acute distress  NEURO: answers questions but does not open eyes, + asterixis  HEENT: icteric sclera, no conjunctival pallor appreciated  CHEST: no respiratory distress, no accessory muscle use  CARDIAC: regular rate, rhythm  ABDOMEN: soft, non-tender, +distended, no rebound or guarding  EXTREMITIES: warm, well perfused, no edema  SKIN: + jaundice    LABS: reviewed                        8.3    5.17  )-----------( 63       ( 15 Rachid 2020 05:47 )             24.1     06-15    133<L>  |  102  |  32<H>  ----------------------------<  209<H>  5.5<H>   |  21<L>  |  1.68<H>    Ca    10.1      15 Rachid 2020 05:47    TPro  6.2  /  Alb  3.4  /  TBili  10.6<H>  /  DBili  x   /  AST  24  /  ALT  18  /  AlkPhos  129<H>  06-15    LIVER FUNCTIONS - ( 15 Rachid 2020 05:47 )  Alb: 3.4 g/dL / Pro: 6.2 g/dL / ALK PHOS: 129 U/L / ALT: 18 U/L / AST: 24 U/L / GGT: x             Interval Diagnostic Studies: see sunrise for full report Chief Complaint:  Patient is a 64y old  Male who presents with a chief complaint of Liver failure, hepatic encephalopathy (14 Jun 2020 12:30)    Reason for consult: Hepatic encephalopathy    Interval Events: Pt confused this AM. No BMs in past 24h.    Hospital Medications:  dextrose 40% Gel 15 Gram(s) Oral once PRN  dextrose 5%. 1000 milliLiter(s) IV Continuous <Continuous>  dextrose 50% Injectable 12.5 Gram(s) IV Push once  dextrose 50% Injectable 25 Gram(s) IV Push once  dextrose 50% Injectable 25 Gram(s) IV Push once  folic acid 1 milliGRAM(s) Oral daily  glucagon  Injectable 1 milliGRAM(s) IntraMuscular once PRN  insulin glargine Injectable (LANTUS) 4 Unit(s) SubCutaneous at bedtime  insulin lispro (HumaLOG) corrective regimen sliding scale   SubCutaneous three times a day before meals  insulin lispro (HumaLOG) corrective regimen sliding scale   SubCutaneous at bedtime  insulin lispro Injectable (HumaLOG) 3 Unit(s) SubCutaneous three times a day before meals  lactulose Syrup 30 Gram(s) Oral every 6 hours  meropenem  IVPB      meropenem  IVPB 1000 milliGRAM(s) IV Intermittent every 12 hours  midodrine. 20 milliGRAM(s) Oral three times a day  nystatin    Suspension [id number] Unit(s) Oral four times a day  nystatin Cream 1 Application(s) Topical two times a day  pantoprazole    Tablet 40 milliGRAM(s) Oral before breakfast  rifAXIMin 550 milliGRAM(s) Oral two times a day  silver sulfADIAZINE 1% Cream 1 Application(s) Topical two times a day  tamsulosin 0.4 milliGRAM(s) Oral at bedtime  traMADol 25 milliGRAM(s) Oral two times a day PRN  trimethoprim  160 mG/sulfamethoxazole 800 mG 1 Tablet(s) Oral daily  ursodiol Capsule 300 milliGRAM(s) Oral two times a day      ROS: pt unable to provide due to AMS    PHYSICAL EXAM:   Vital Signs:  Vital Signs Last 24 Hrs  T(C): 36.7 (15 Rachid 2020 07:22), Max: 36.9 (14 Jun 2020 16:47)  T(F): 98.1 (15 Rachid 2020 07:22), Max: 98.4 (14 Jun 2020 16:47)  HR: 80 (15 Rachid 2020 07:22) (80 - 88)  BP: 114/66 (15 Rachid 2020 07:22) (110/63 - 126/70)  BP(mean): --  RR: 18 (15 Rachid 2020 07:22) (18 - 18)  SpO2: 100% (15 Rachid 2020 07:22) (95% - 100%)  Daily     Daily     GENERAL: Sitting upright in bed, in NAD  NEURO: Alert, oriented to self only, +asterixis  HEENT: Sclera icteric, no conjunctival pallor appreciated, MMM  CHEST: no respiratory distress, no accessory muscle use, CTAB  CARDIAC: RRR, no JVD  ABDOMEN: +BS, mildly distended, obese, soft, NTTP, no suprapubic tenderness  : Beasley catheter in place  EXTREMITIES: warm, well perfused, no edema  SKIN: +Jaundice, +spider angiomata    LABS: reviewed                        8.3    5.17  )-----------( 63       ( 15 Rachid 2020 05:47 )             24.1     06-15    133<L>  |  102  |  32<H>  ----------------------------<  209<H>  5.5<H>   |  21<L>  |  1.68<H>    Ca    10.1      15 Rachid 2020 05:47    TPro  6.2  /  Alb  3.4  /  TBili  10.6<H>  /  DBili  x   /  AST  24  /  ALT  18  /  AlkPhos  129<H>  06-15    LIVER FUNCTIONS - ( 15 Rachid 2020 05:47 )  Alb: 3.4 g/dL / Pro: 6.2 g/dL / ALK PHOS: 129 U/L / ALT: 18 U/L / AST: 24 U/L / GGT: x             Interval Diagnostic Studies: see sunrise for full report

## 2020-06-15 NOTE — PROGRESS NOTE ADULT - SUBJECTIVE AND OBJECTIVE BOX
[facility] DIVISION OF KIDNEY DISEASES AND HYPERTENSION -- FOLLOW UP NOTE  --------------------------------------------------------------------------------  Chief Complaint:    24 hour events/subjective:        PAST HISTORY  --------------------------------------------------------------------------------  No significant changes to PMH, PSH, FHx, SHx, unless otherwise noted    ALLERGIES & MEDICATIONS  --------------------------------------------------------------------------------  Allergies    codeine (Anaphylaxis)    Intolerances      Standing Inpatient Medications  dextrose 5%. 1000 milliLiter(s) IV Continuous <Continuous>  dextrose 50% Injectable 12.5 Gram(s) IV Push once  dextrose 50% Injectable 25 Gram(s) IV Push once  dextrose 50% Injectable 25 Gram(s) IV Push once  folic acid 1 milliGRAM(s) Oral daily  insulin glargine Injectable (LANTUS) 4 Unit(s) SubCutaneous at bedtime  insulin lispro (HumaLOG) corrective regimen sliding scale   SubCutaneous three times a day before meals  insulin lispro (HumaLOG) corrective regimen sliding scale   SubCutaneous at bedtime  insulin lispro Injectable (HumaLOG) 3 Unit(s) SubCutaneous three times a day before meals  lactulose Syrup 30 Gram(s) Oral every 6 hours  meropenem  IVPB      meropenem  IVPB 1000 milliGRAM(s) IV Intermittent every 12 hours  midodrine. 20 milliGRAM(s) Oral three times a day  nystatin    Suspension [id number] Unit(s) Oral four times a day  nystatin Cream 1 Application(s) Topical two times a day  pantoprazole    Tablet 40 milliGRAM(s) Oral before breakfast  rifAXIMin 550 milliGRAM(s) Oral two times a day  silver sulfADIAZINE 1% Cream 1 Application(s) Topical two times a day  tamsulosin 0.4 milliGRAM(s) Oral at bedtime  trimethoprim  160 mG/sulfamethoxazole 800 mG 1 Tablet(s) Oral daily  ursodiol Capsule 300 milliGRAM(s) Oral two times a day    PRN Inpatient Medications  dextrose 40% Gel 15 Gram(s) Oral once PRN  glucagon  Injectable 1 milliGRAM(s) IntraMuscular once PRN  traMADol 25 milliGRAM(s) Oral two times a day PRN      REVIEW OF SYSTEMS  --------------------------------------------------------------------------------  Gen: No fatigue, fevers/chills, weakness  Skin: No rashes  Head/Eyes/Ears/Mouth: No headache;No sore throat  Respiratory: No dyspnea, cough,   CV: No chest pain, PND, orthopnea  GI: No abdominal pain, diarrhea, constipation, nausea, vomiting  Transplant: No pain  : No increased frequency, dysuria, hematuria, nocturia  MSK: No joint pain/swelling; no back pain; no edema  Neuro: No dizziness/lightheadedness, weakness, seizures, numbness, tingling  Psych: No significant nervousness, anxiety, stress, depression    All other systems were reviewed and are negative, except as noted.    VITALS/PHYSICAL EXAM  --------------------------------------------------------------------------------  T(C): 36.7 (06-15-20 @ 07:22), Max: 36.9 (06-14-20 @ 16:47)  HR: 80 (06-15-20 @ 07:22) (80 - 88)  BP: 114/66 (06-15-20 @ 07:22) (110/63 - 126/70)  RR: 18 (06-15-20 @ 07:22) (18 - 18)  SpO2: 100% (06-15-20 @ 07:22) (95% - 100%)  Wt(kg): --        06-14-20 @ 07:01  -  06-15-20 @ 07:00  --------------------------------------------------------  IN: 120 mL / OUT: 1500 mL / NET: -1380 mL      Physical Exam:  	Gen: NAD, well-appearing  	HEENT: PERRL, supple neck, clear oropharynx  	Pulm: CTA B/L  	CV: RRR, S1S2; no rub  	Back: No spinal or CVA tenderness; no sacral edema  	Abd: +BS, soft, nontender/nondistended                      Transplant: No tenderness, swelling  	: No suprapubic tenderness  	UE: Warm, FROM, intact strength; no edema; no asterixis  	LE: Warm, FROM, intact strength; no edema  	Neuro: No focal deficits, intact gait  	Psych: Normal affect and mood  	Skin: Warm, without rashes      LABS/STUDIES  --------------------------------------------------------------------------------              8.3    5.17  >-----------<  63       [06-15-20 @ 05:47]              24.1     133  |  102  |  32  ----------------------------<  209      [06-15-20 @ 05:47]  5.5   |  21  |  1.68        Ca     10.1     [06-15-20 @ 05:47]    TPro  6.2  /  Alb  3.4  /  TBili  10.6  /  DBili  x   /  AST  24  /  ALT  18  /  AlkPhos  129  [06-15-20 @ 05:47]    PT/INR: PT 25.1 , INR 2.17       [06-15-20 @ 08:13]  PTT: 44.2       [06-14-20 @ 09:19]      Creatinine Trend:  SCr 1.68 [06-15 @ 05:47]  SCr 1.74 [06-14 @ 07:17]  SCr 1.75 [06-13 @ 07:29]  SCr 1.55 [06-12 @ 11:13]  SCr 1.57 [06-11 @ 11:33]              Urinalysis - [06-11-20 @ 19:57]      Color Yellow / Appearance Clear / SG 1.013 / pH 5.0      Gluc Negative / Ketone Negative  / Bili Negative / Urobili 2 mg/dL       Blood Small / Protein Negative / Leuk Est Large / Nitrite Negative      RBC 4 / WBC 54 / Hyaline 0 / Gran  / Sq Epi  / Non Sq Epi 0 / Bacteria Many    Urine Creatinine 61      [06-11-20 @ 19:57]  Urine Protein 10      [06-11-20 @ 19:57]  Urine Sodium 92      [06-14-20 @ 10:52]  Urine Urea Nitrogen 450      [06-12-20 @ 02:46]    Iron 160, TIBC Unable to calculate Test Repeated, %sat Unable to calculate Test Repeated      [06-12-20 @ 09:04]  Ferritin 493      [04-29-20 @ 08:20]  PTH -- (Ca 10.3)      [12-13-19 @ 08:11]   12  Vitamin D (25OH) 25.3      [12-13-19 @ 08:11]  HbA1c 6.4      [02-12-20 @ 17:08]  TSH 3.26      [04-26-20 @ 09:47]  Lipid: chol 71, TG 63, HDL 30, LDL 28      [11-27-19 @ 09:11]    HBsAb Nonreact      [06-13-20 @ 10:35]  HBsAg Nonreact      [06-13-20 @ 10:35]  HBcAb Nonreact      [06-13-20 @ 10:35] [facility] DIVISION OF KIDNEY DISEASES AND HYPERTENSION -- FOLLOW UP NOTE  --------------------------------------------------------------------------------  64M PMHx HFpEF, 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 Nephrology consulted for MISA.  [facility] s[facility] 1.0 in [facility]/2020). On admission serum creatinine 1.57 on 6/11 which worsened to 1.75 on 6/13.  Urinalysis showed UTI (bacteria, LE, wbc) with no protein/rbc.  Urine sodium 112 and urine protein to creatinine was 0.2 g.  Home medication lasix and aldactone (held during this hospitalization).  Patient was hypotensive 94/52 on admission and good UOP during hospitalization.  Hepatology following, pt started on midodrine and albumin however serum Cr without improvement.  Of note, prior MISA workup in Dec 2019 showed elevated K/L ratio 2.91 (K>L) and immunofixation showing "one Weak IgA Lambda  Band and One Weak IgG Lambda Band Identified" otherwise normal complement, neg SRAVAN/dsDNA/ANCA.    Patient seen this morning appears confused and altered. Does not responds to commands  No acute events overnight  UO: 1.3L over the past 24 hours    PAST HISTORY  --------------------------------------------------------------------------------  No significant changes to PMH, PSH, FHx, SHx, unless otherwise noted    ALLERGIES & MEDICATIONS  --------------------------------------------------------------------------------  Allergies    codeine (Anaphylaxis)    Intolerances      Standing Inpatient Medications  dextrose 5%. 1000 milliLiter(s) IV Continuous <Continuous>  dextrose 50% Injectable 12.5 Gram(s) IV Push once  dextrose 50% Injectable 25 Gram(s) IV Push once  dextrose 50% Injectable 25 Gram(s) IV Push once  folic acid 1 milliGRAM(s) Oral daily  insulin glargine Injectable (LANTUS) 4 Unit(s) SubCutaneous at bedtime  insulin lispro (HumaLOG) corrective regimen sliding scale   SubCutaneous three times a day before meals  insulin lispro (HumaLOG) corrective regimen sliding scale   SubCutaneous at bedtime  insulin lispro Injectable (HumaLOG) 3 Unit(s) SubCutaneous three times a day before meals  lactulose Syrup 30 Gram(s) Oral every 6 hours  meropenem  IVPB      meropenem  IVPB 1000 milliGRAM(s) IV Intermittent every 12 hours  midodrine. 20 milliGRAM(s) Oral three times a day  nystatin    Suspension [id number] Unit(s) Oral four times a day  nystatin Cream 1 Application(s) Topical two times a day  pantoprazole    Tablet 40 milliGRAM(s) Oral before breakfast  rifAXIMin 550 milliGRAM(s) Oral two times a day  silver sulfADIAZINE 1% Cream 1 Application(s) Topical two times a day  tamsulosin 0.4 milliGRAM(s) Oral at bedtime  trimethoprim  160 mG/sulfamethoxazole 800 mG 1 Tablet(s) Oral daily  ursodiol Capsule 300 milliGRAM(s) Oral two times a day    PRN Inpatient Medications  dextrose 40% Gel 15 Gram(s) Oral once PRN  glucagon  Injectable 1 milliGRAM(s) IntraMuscular once PRN  traMADol 25 milliGRAM(s) Oral two times a day PRN      REVIEW OF SYSTEMS  --------------------------------------------------------------------------------  Unable to assess    VITALS/PHYSICAL EXAM  --------------------------------------------------------------------------------  T(C): 36.7 (06-15-20 @ 07:22), Max: 36.9 (06-14-20 @ 16:47)  HR: 80 (06-15-20 @ 07:22) (80 - 88)  BP: 114/66 (06-15-20 @ 07:22) (110/63 - 126/70)  RR: 18 (06-15-20 @ 07:22) (18 - 18)  SpO2: 100% (06-15-20 @ 07:22) (95% - 100%)  Wt(kg): --        06-14-20 @ 07:01  -  06-15-20 @ 07:00  --------------------------------------------------------  IN: 120 mL / OUT: 1500 mL / NET: -1380 mL      Physical Exam:  	Gen: NAD, well-appearing on room air  	HEENT: moist mucous membrane  	Pulm: CTA B/L, no crackles  	CV: RRR, S1S2; no rub/murmur  	GI: +BS, soft, nontender  	: No suprapubic tenderness  	MSK: Warm, no edema, mild asterixis              Neuro: AAOx3  	Psych: Normal affect and mood  	Skin: Warm, no cyanosis      LABS/STUDIES  --------------------------------------------------------------------------------              8.3    5.17  >-----------<  63       [06-15-20 @ 05:47]              24.1     133  |  102  |  32  ----------------------------<  209      [06-15-20 @ 05:47]  5.5   |  21  |  1.68        Ca     10.1     [06-15-20 @ 05:47]    TPro  6.2  /  Alb  3.4  /  TBili  10.6  /  DBili  x   /  AST  24  /  ALT  18  /  AlkPhos  129  [06-15-20 @ 05:47]    PT/INR: PT 25.1 , INR 2.17       [06-15-20 @ 08:13]  PTT: 44.2       [06-14-20 @ 09:19]      Creatinine Trend:  SCr 1.68 [06-15 @ 05:47]  SCr 1.74 [06-14 @ 07:17]  SCr 1.75 [06-13 @ 07:29]  SCr 1.55 [06-12 @ 11:13]  SCr 1.57 [06-11 @ 11:33]              Urinalysis - [06-11-20 @ 19:57]      Color Yellow / Appearance Clear / SG 1.013 / pH 5.0      Gluc Negative / Ketone Negative  / Bili Negative / Urobili 2 mg/dL       Blood Small / Protein Negative / Leuk Est Large / Nitrite Negative      RBC 4 / WBC 54 / Hyaline 0 / Gran  / Sq Epi  / Non Sq Epi 0 / Bacteria Many    Urine Creatinine 61      [06-11-20 @ 19:57]  Urine Protein 10      [06-11-20 @ 19:57]  Urine Sodium 92      [06-14-20 @ 10:52]  Urine Urea Nitrogen 450      [06-12-20 @ 02:46]    Iron 160, TIBC Unable to calculate Test Repeated, %sat Unable to calculate Test Repeated      [06-12-20 @ 09:04]  Ferritin 493      [04-29-20 @ 08:20]  PTH -- (Ca 10.3)      [12-13-19 @ 08:11]   12  Vitamin D (25OH) 25.3      [12-13-19 @ 08:11]  HbA1c 6.4      [02-12-20 @ 17:08]  TSH 3.26      [04-26-20 @ 09:47]  Lipid: chol 71, TG 63, HDL 30, LDL 28      [11-27-19 @ 09:11]    HBsAb Nonreact      [06-13-20 @ 10:35]  HBsAg Nonreact      [06-13-20 @ 10:35]  HBcAb Nonreact      [06-13-20 @ 10:35]

## 2020-06-15 NOTE — PROGRESS NOTE ADULT - ASSESSMENT
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     CT Pelvis with no evidence of abscess (but noncontrast)  BCx 6/12 with NGTD  UCx 6/14 with gram positive organisms (suspect VRE - had this previously)    Given worsening confusion out of proportion to patient's previous presentations for HE favor covering for VRE  Would stop Meropenem (as no GNR organism identified) and switch to Linezolid  Linezolid CAN interact with Midodrine and Tramadol (potential for serotonin syndrome but low likelihood - would monitor)    RECOMMENDATIONS    #Encephalopathy, Positive UCx, VRE Colonization  --Stop Meropenem  --Recommend Linezolid 600 mg PO Q12H (can switch to IV if not tolerating PO)  --Continue to follow CBC with diff  --Continue to follow renal function (Cr/BUN)  --Continue to follow temperature curve  --Follow up on preliminary blood cultures  --Followup on ID of GPC on UCx    #Pre-OLT Evaluation, Encounter for Vaccination  --Not cleared from ID perspective for OLT as unclear if infection contributing to presentation  --PCV 13 and 23 vaccination completed February 2020  --Will require HBV Vaccination - would give dose prior to discharge    I will continue to follow. Please feel free to contact me with any further questions.    Euesbio Pérez M.D.  [facility] Division of Infectious Disease  8AM-5PM: Pager Number [phone]  After Hours (or if no response): Please contact the Infectious Diseases Office at [phone]     The above assessment and plan were discussed with Dr Cary Rivera and Dr Letitia Mo

## 2020-06-15 NOTE — PROGRESS NOTE ADULT - PROBLEM SELECTOR PLAN 3
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

## 2020-06-16 LAB
-  AMPICILLIN: SIGNIFICANT CHANGE UP
-  CIPROFLOXACIN: SIGNIFICANT CHANGE UP
-  DAPTOMYCIN: SIGNIFICANT CHANGE UP
-  LEVOFLOXACIN: SIGNIFICANT CHANGE UP
-  LINEZOLID: SIGNIFICANT CHANGE UP
-  NITROFURANTOIN: SIGNIFICANT CHANGE UP
-  TETRACYCLINE: SIGNIFICANT CHANGE UP
-  VANCOMYCIN: SIGNIFICANT CHANGE UP
AMMONIA BLD-MCNC: 56 UMOL/L — HIGH (ref 11–55)
ANION GAP SERPL CALC-SCNC: 11 MMOL/L — SIGNIFICANT CHANGE UP (ref 5–17)
BASOPHILS # BLD AUTO: 0.04 K/UL — SIGNIFICANT CHANGE UP (ref 0–0.2)
BASOPHILS NFR BLD AUTO: 0.8 % — SIGNIFICANT CHANGE UP (ref 0–2)
BUN SERPL-MCNC: 36 MG/DL — HIGH (ref 7–23)
CALCIUM SERPL-MCNC: 10.7 MG/DL — HIGH (ref 8.4–10.5)
CHLORIDE SERPL-SCNC: 103 MMOL/L — SIGNIFICANT CHANGE UP (ref 96–108)
CO2 SERPL-SCNC: 19 MMOL/L — LOW (ref 22–31)
CREAT SERPL-MCNC: 1.72 MG/DL — HIGH (ref 0.5–1.3)
CULTURE RESULTS: SIGNIFICANT CHANGE UP
EOSINOPHIL # BLD AUTO: 0.14 K/UL — SIGNIFICANT CHANGE UP (ref 0–0.5)
EOSINOPHIL NFR BLD AUTO: 2.9 % — SIGNIFICANT CHANGE UP (ref 0–6)
GLUCOSE SERPL-MCNC: 184 MG/DL — HIGH (ref 70–99)
HCT VFR BLD CALC: 22.9 % — LOW (ref 39–50)
HGB BLD-MCNC: 7.7 G/DL — LOW (ref 13–17)
IMM GRANULOCYTES NFR BLD AUTO: 0.4 % — SIGNIFICANT CHANGE UP (ref 0–1.5)
LYMPHOCYTES # BLD AUTO: 1.06 K/UL — SIGNIFICANT CHANGE UP (ref 1–3.3)
LYMPHOCYTES # BLD AUTO: 22.1 % — SIGNIFICANT CHANGE UP (ref 13–44)
MCHC RBC-ENTMCNC: 33.6 GM/DL — SIGNIFICANT CHANGE UP (ref 32–36)
MCHC RBC-ENTMCNC: 36.2 PG — HIGH (ref 27–34)
MCV RBC AUTO: 107.5 FL — HIGH (ref 80–100)
METHOD TYPE: SIGNIFICANT CHANGE UP
MONOCYTES # BLD AUTO: 0.47 K/UL — SIGNIFICANT CHANGE UP (ref 0–0.9)
MONOCYTES NFR BLD AUTO: 9.8 % — SIGNIFICANT CHANGE UP (ref 2–14)
NEUTROPHILS # BLD AUTO: 3.06 K/UL — SIGNIFICANT CHANGE UP (ref 1.8–7.4)
NEUTROPHILS NFR BLD AUTO: 64 % — SIGNIFICANT CHANGE UP (ref 43–77)
NRBC # BLD: 0 /100 WBCS — SIGNIFICANT CHANGE UP (ref 0–0)
ORGANISM # SPEC MICROSCOPIC CNT: SIGNIFICANT CHANGE UP
ORGANISM # SPEC MICROSCOPIC CNT: SIGNIFICANT CHANGE UP
PLATELET # BLD AUTO: 61 K/UL — LOW (ref 150–400)
POTASSIUM SERPL-MCNC: 5.4 MMOL/L — HIGH (ref 3.5–5.3)
POTASSIUM SERPL-SCNC: 5.4 MMOL/L — HIGH (ref 3.5–5.3)
RBC # BLD: 2.13 M/UL — LOW (ref 4.2–5.8)
RBC # FLD: 17.9 % — HIGH (ref 10.3–14.5)
SODIUM SERPL-SCNC: 133 MMOL/L — LOW (ref 135–145)
SPECIMEN SOURCE: SIGNIFICANT CHANGE UP
WBC # BLD: 4.79 K/UL — SIGNIFICANT CHANGE UP (ref 3.8–10.5)
WBC # FLD AUTO: 4.79 K/UL — SIGNIFICANT CHANGE UP (ref 3.8–10.5)

## 2020-06-16 PROCEDURE — 99232 SBSQ HOSP IP/OBS MODERATE 35: CPT

## 2020-06-16 PROCEDURE — 99233 SBSQ HOSP IP/OBS HIGH 50: CPT

## 2020-06-16 PROCEDURE — 99233 SBSQ HOSP IP/OBS HIGH 50: CPT | Mod: GC

## 2020-06-16 RX ORDER — ONDANSETRON 8 MG/1
4 TABLET, FILM COATED ORAL EVERY 8 HOURS
Refills: 0 | Status: DISCONTINUED | OUTPATIENT
Start: 2020-06-16 | End: 2020-07-02

## 2020-06-16 RX ORDER — HEPATITIS B VIRUS VACCINE,RECB 20 MCG/ML
20 VIAL (ML) INTRAMUSCULAR ONCE
Refills: 0 | Status: DISCONTINUED | OUTPATIENT
Start: 2020-06-16 | End: 2020-07-02

## 2020-06-16 RX ORDER — POLYETHYLENE GLYCOL 3350 17 G/17G
17 POWDER, FOR SOLUTION ORAL DAILY
Refills: 0 | Status: DISCONTINUED | OUTPATIENT
Start: 2020-06-16 | End: 2020-06-21

## 2020-06-16 RX ORDER — ALBUMIN HUMAN 25 %
100 VIAL (ML) INTRAVENOUS EVERY 6 HOURS
Refills: 0 | Status: DISCONTINUED | OUTPATIENT
Start: 2020-06-16 | End: 2020-06-18

## 2020-06-16 RX ADMIN — Medication 50 MILLILITER(S): at 21:29

## 2020-06-16 RX ADMIN — URSODIOL 300 MILLIGRAM(S): 250 TABLET, FILM COATED ORAL at 06:14

## 2020-06-16 RX ADMIN — Medication 50 MILLILITER(S): at 08:22

## 2020-06-16 RX ADMIN — LACTULOSE 30 GRAM(S): 10 SOLUTION ORAL at 23:45

## 2020-06-16 RX ADMIN — Medication 3 UNIT(S): at 14:08

## 2020-06-16 RX ADMIN — Medication 2: at 14:08

## 2020-06-16 RX ADMIN — Medication 500000 UNIT(S): at 17:34

## 2020-06-16 RX ADMIN — MIDODRINE HYDROCHLORIDE 20 MILLIGRAM(S): 2.5 TABLET ORAL at 17:34

## 2020-06-16 RX ADMIN — Medication 1: at 08:21

## 2020-06-16 RX ADMIN — Medication 3 UNIT(S): at 17:27

## 2020-06-16 RX ADMIN — NYSTATIN CREAM 1 APPLICATION(S): 100000 CREAM TOPICAL at 17:35

## 2020-06-16 RX ADMIN — PANTOPRAZOLE SODIUM 40 MILLIGRAM(S): 20 TABLET, DELAYED RELEASE ORAL at 06:14

## 2020-06-16 RX ADMIN — Medication 1 MILLIGRAM(S): at 12:01

## 2020-06-16 RX ADMIN — INSULIN GLARGINE 4 UNIT(S): 100 INJECTION, SOLUTION SUBCUTANEOUS at 21:28

## 2020-06-16 RX ADMIN — ONDANSETRON 4 MILLIGRAM(S): 8 TABLET, FILM COATED ORAL at 10:03

## 2020-06-16 RX ADMIN — ONDANSETRON 4 MILLIGRAM(S): 8 TABLET, FILM COATED ORAL at 21:28

## 2020-06-16 RX ADMIN — URSODIOL 300 MILLIGRAM(S): 250 TABLET, FILM COATED ORAL at 17:35

## 2020-06-16 RX ADMIN — NYSTATIN CREAM 1 APPLICATION(S): 100000 CREAM TOPICAL at 06:14

## 2020-06-16 RX ADMIN — Medication 3: at 17:26

## 2020-06-16 RX ADMIN — LINEZOLID 600 MILLIGRAM(S): 600 INJECTION, SOLUTION INTRAVENOUS at 06:14

## 2020-06-16 RX ADMIN — LACTULOSE 30 GRAM(S): 10 SOLUTION ORAL at 06:14

## 2020-06-16 RX ADMIN — Medication 50 MILLILITER(S): at 14:54

## 2020-06-16 RX ADMIN — TAMSULOSIN HYDROCHLORIDE 0.4 MILLIGRAM(S): 0.4 CAPSULE ORAL at 21:28

## 2020-06-16 RX ADMIN — ONDANSETRON 4 MILLIGRAM(S): 8 TABLET, FILM COATED ORAL at 17:34

## 2020-06-16 RX ADMIN — Medication 3 UNIT(S): at 08:21

## 2020-06-16 RX ADMIN — Medication 50 MILLILITER(S): at 02:29

## 2020-06-16 RX ADMIN — LINEZOLID 600 MILLIGRAM(S): 600 INJECTION, SOLUTION INTRAVENOUS at 17:34

## 2020-06-16 RX ADMIN — Medication 1 TABLET(S): at 12:01

## 2020-06-16 RX ADMIN — MIDODRINE HYDROCHLORIDE 20 MILLIGRAM(S): 2.5 TABLET ORAL at 12:01

## 2020-06-16 RX ADMIN — Medication 1 APPLICATION(S): at 06:14

## 2020-06-16 RX ADMIN — Medication 1 APPLICATION(S): at 17:31

## 2020-06-16 RX ADMIN — Medication 500000 UNIT(S): at 06:14

## 2020-06-16 RX ADMIN — MIDODRINE HYDROCHLORIDE 20 MILLIGRAM(S): 2.5 TABLET ORAL at 06:14

## 2020-06-16 RX ADMIN — Medication 500000 UNIT(S): at 12:01

## 2020-06-16 RX ADMIN — SODIUM ZIRCONIUM CYCLOSILICATE 10 GRAM(S): 10 POWDER, FOR SUSPENSION ORAL at 02:30

## 2020-06-16 NOTE — PROGRESS NOTE ADULT - SUBJECTIVE AND OBJECTIVE BOX
Follow Up:  Encephalopathy    Interval History: More alert today. Denies any pain or urinary symptoms    REVIEW OF SYSTEMS  [  ] ROS unobtainable because:    [ x ] All other systems negative except as noted below    Constitutional:  [ ] fever [ ] chills  [ ] weight loss  [ ] weakness  Skin:  [ ] rash [ ] phlebitis	  Eyes: [ ] icterus [ ] pain  [ ] discharge	  ENMT: [ ] sore throat  [ ] thrush [ ] ulcers [ ] exudates  Respiratory: [ ] dyspnea [ ] hemoptysis [ ] cough [ ] sputum	  Cardiovascular:  [ ] chest pain [ ] palpitations [ ] edema	  Gastrointestinal:  [ ] nausea [ ] vomiting [ ] diarrhea [ ] constipation [ ] pain	  Genitourinary:  [ ] dysuria [ ] frequency [ ] hematuria [ ] discharge [ ] flank pain  [ ] incontinence  Musculoskeletal:  [ ] myalgias [ ] arthralgias [ ] arthritis  [ ] back pain  Neurological:  [ ] headache [ ] seizures  [x ] confusion/altered mental status    Allergies  codeine (Anaphylaxis)        ANTIMICROBIALS:  linezolid    Tablet 600 every 12 hours  nystatin    Suspension [id number] four times a day  rifAXIMin 550 two times a day  trimethoprim  160 mG/sulfamethoxazole 800 mG 1 daily      OTHER MEDS:  MEDICATIONS  (STANDING):  dextrose 40% Gel 15 once PRN  dextrose 50% Injectable 12.5 once  dextrose 50% Injectable 25 once  dextrose 50% Injectable 25 once  glucagon  Injectable 1 once PRN  insulin glargine Injectable (LANTUS) 4 at bedtime  insulin lispro (HumaLOG) corrective regimen sliding scale  three times a day before meals  insulin lispro (HumaLOG) corrective regimen sliding scale  at bedtime  insulin lispro Injectable (HumaLOG) 3 three times a day before meals  lactulose Syrup 30 every 6 hours  midodrine. 20 three times a day  ondansetron Injectable 4 every 8 hours  pantoprazole    Tablet 40 before breakfast  polyethylene glycol 3350 17 daily  tamsulosin 0.4 at bedtime  traMADol 25 two times a day PRN  ursodiol Capsule 300 two times a day      Vital Signs Last 24 Hrs  T(C): 36.2 (16 Jun 2020 16:10), Max: 36.6 (15 Rachid 2020 23:30)  T(F): 97.2 (16 Jun 2020 16:10), Max: 97.9 (15 Rachid 2020 23:30)  HR: 85 (16 Jun 2020 16:10) (85 - 94)  BP: 108/56 (16 Jun 2020 16:10) (108/56 - 135/68)  BP(mean): --  RR: 18 (16 Jun 2020 10:10) (18 - 18)  SpO2: 100% (16 Jun 2020 10:10) (100% - 100%)    PHYSICAL EXAMINATION:  General: Alert and Awake, NAD, jaundice  HEENT: PERRL, EOMI, scleral icterus  Neck: Supple  Cardiac: RRR, No M/R/G  Resp: CTAB, No Wh/Rh/Ra  Abdomen: NBS, NT/ND, No HSM, No rigidity or guarding  MSK: 1+ LE edema. No Calf tenderness  : bob  Skin: No rashes or lesions. Skin is warm and dry to the touch.   Neuro: Alert and Awake. CN 2-12 Grossly intact. Moves all four extremities spontaneously.  Psych: Calm, Pleasant, Cooperative                          7.7    4.79  )-----------( 61       ( 16 Jun 2020 05:25 )             22.9       06-16    133<L>  |  103  |  36<H>  ----------------------------<  184<H>  5.4<H>   |  19<L>  |  1.72<H>    Ca    10.7<H>      16 Jun 2020 05:25    TPro  6.2  /  Alb  3.4  /  TBili  10.6<H>  /  DBili  x   /  AST  24  /  ALT  18  /  AlkPhos  129<H>  06-15          MICROBIOLOGY:  v  .Urine Catheterized  06-14-20   >100,000 CFU/ml Enterococcus faecium (vancomycin resistant)  --  Enterococcus faecium (vancomycin resistant)      .Urine Clean Catch (Midstream)  06-14-20   >100,000 CFU/ml Enterococcus faecium (vancomycin resistant)  --  Enterococcus faecium (vancomycin resistant)      .Blood Blood-Peripheral  06-12-20   No growth to date.  --  --    CMV IgG Antibody: <0.20 U/mL (12-04-19 @ 08:33)  Toxoplasma IgG Screen: <3.0 IU/mL (12-04-19 @ 08:33)      RADIOLOGY:    <The imaging below has been reviewed and visualized by me independently. Findings as detailed in report below>    EXAM:  CT PELVIS ONLY                        PROCEDURE DATE:  06/12/2020    Evaluation without intravenous contrast. No prostate abscess noted within the limitations of the exam.

## 2020-06-16 NOTE — PROGRESS NOTE ADULT - ATTENDING COMMENTS
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 He is re-admitted with hepatic encephalopathy and concern for recurrent UTI, also with MISA on CKD.    # Complicated UTI with VRE: Transplant ID appreciated, and continuing on linezolid (6/15- ), plus Bactrim prophylaxis (given prior ESBL E. coli infection).    # Hepatic encephalopathy: Improved today, but still with [place] grade 2 HE today with disorientation, psychomotor retardation, and asterixis. Continue rifaximin, Miralax, and lactulose and will titrate as needed.    # MISA on CKD: Transplant Nephrology input appreciated. Agree with continuation of IV albumin for now (especially given anticipated stool losses from aggressive lactulose, as above). Hold diuretics for now.    # Orthostatic hypotension: Chronic, related to end-stage liver disease. Continue home midodrine.    # Hyponatremia: Mild. Continue fluid restriction.    # Chronic anemia secondary to chronic GI blood loss: No recent overt bleeding. Hb/HCT stable compared to his baseline.    # Decompensated JEAN cirrhosis: He is currently UNOS wait-listed for liver transplantation at [facility], blood type A, with MELD-Na 30 using today's BMP but yesterday's TBili and INR (since not checked today). Please continue to check MELD labs daily. Currently inactive on transplant wait-list due to his active infection. Will need resolution of active infection prior to proceeding with transplantation.    Please don't hesitate to call with any questions/concerns.    Letitia Mo M.D., Ph.D.  Transplant Hepatology  Cell: [phone]

## 2020-06-16 NOTE — PROGRESS NOTE ADULT - SUBJECTIVE AND OBJECTIVE BOX
[facility] DIVISION OF KIDNEY DISEASES AND HYPERTENSION -- FOLLOW UP NOTE  --------------------------------------------------------------------------------  64M PMHx HFpEF, decompensated cirrhosis 2/2 JEAN (enlisted for liver transplant) c/b ascites, hx SBP, duodenal AVM and DM2 who admitted to [facility] for acute encephalopathy. Nephrology consulted for MISA.  [facility] baseline sCr 1.0 in 5/2020). On admission serum creatinine 1.57 on 6/11 which worsened to 1.75 on 6/13.  Urinalysis showed UTI no protein/rbc.  Home medication Lasix and aldactone (held during this hospitalization). On admission, pt was hypotensive and started on midodrine and albumin however serum Cr without improvement.  Of note, prior MISA workup in Dec 2019 showed elevated K/L ratio 2.91 (K>L) and immunofixation showing "one Weak IgA Lambda  Band and One Weak IgG Lambda Band Identified" otherwise normal complement, neg SRAVAN/dsDNA/ANCA.      24 hour events noted  Patient started on Lactulose  Mental status significantly improved this morning  UO: 951 cc over the past 24 hours      PAST HISTORY  --------------------------------------------------------------------------------  No significant changes to PMH, PSH, FHx, SHx, unless otherwise noted    ALLERGIES & MEDICATIONS  --------------------------------------------------------------------------------  Allergies    codeine (Anaphylaxis)    Intolerances      Standing Inpatient Medications  albumin human 25% IVPB 100 milliLiter(s) IV Intermittent every 6 hours  dextrose 5%. 1000 milliLiter(s) IV Continuous <Continuous>  dextrose 50% Injectable 12.5 Gram(s) IV Push once  dextrose 50% Injectable 25 Gram(s) IV Push once  dextrose 50% Injectable 25 Gram(s) IV Push once  folic acid 1 milliGRAM(s) Oral daily  insulin glargine Injectable (LANTUS) 4 Unit(s) SubCutaneous at bedtime  insulin lispro (HumaLOG) corrective regimen sliding scale   SubCutaneous three times a day before meals  insulin lispro (HumaLOG) corrective regimen sliding scale   SubCutaneous at bedtime  insulin lispro Injectable (HumaLOG) 3 Unit(s) SubCutaneous three times a day before meals  lactulose Syrup 30 Gram(s) Oral every 6 hours  linezolid    Tablet 600 milliGRAM(s) Oral every 12 hours  midodrine. 20 milliGRAM(s) Oral three times a day  nystatin    Suspension 042585 Unit(s) Oral four times a day  nystatin Cream 1 Application(s) Topical two times a day  ondansetron Injectable 4 milliGRAM(s) IV Push every 8 hours  pantoprazole    Tablet 40 milliGRAM(s) Oral before breakfast  polyethylene glycol 3350 17 Gram(s) Oral daily  rifAXIMin 550 milliGRAM(s) Oral two times a day  silver sulfADIAZINE 1% Cream 1 Application(s) Topical two times a day  tamsulosin 0.4 milliGRAM(s) Oral at bedtime  trimethoprim  160 mG/sulfamethoxazole 800 mG 1 Tablet(s) Oral daily  ursodiol Capsule 300 milliGRAM(s) Oral two times a day    PRN Inpatient Medications  dextrose 40% Gel 15 Gram(s) Oral once PRN  glucagon  Injectable 1 milliGRAM(s) IntraMuscular once PRN  traMADol 25 milliGRAM(s) Oral two times a day PRN      REVIEW OF SYSTEMS  --------------------------------------------------------------------------------  Gen: no fever, chills, weakness  Respiratory: No dyspnea, cough  CV: No chest pain, orthopnea  GI: No abdominal pain, nausea, vomiting, diarrhea  MSK: no edema  Neuro: No dizziness, lightheadedness  Heme: No bleeding    All other systems were reviewed and are negative, except as noted.    VITALS/PHYSICAL EXAM  --------------------------------------------------------------------------------  T(C): 36.4 (06-16-20 @ 08:19), Max: 36.9 (06-15-20 @ 15:44)  HR: 85 (06-16-20 @ 10:10) (81 - 94)  BP: 135/68 (06-16-20 @ 10:10) (112/64 - 135/68)  RR: 18 (06-16-20 @ 10:10) (18 - 18)  SpO2: 100% (06-16-20 @ 10:10) (100% - 100%)  Wt(kg): --        06-15-20 @ 07:01  -  06-16-20 @ 07:00  --------------------------------------------------------  IN: 200 mL / OUT: 951 mL / NET: -751 mL    06-16-20 @ 07:01  -  06-16-20 @ 12:09  --------------------------------------------------------  IN: 100 mL / OUT: 253 mL / NET: -153 mL      Physical Exam:  	Gen: NAD, well-appearing on room air  	HEENT: moist mucous membrane  	Pulm: CTA B/L, no crackles  	CV: RRR, S1S2; no rub/murmur  	GI: +BS, soft, nontender  	: No suprapubic tenderness  	MSK: Warm, no edema, mild asterixis              Neuro: AAOx3  	Psych: Normal affect and mood  	Skin: Warm, no cyanosis  LABS/STUDIES  --------------------------------------------------------------------------------              7.7    4.79  >-----------<  61       [06-16-20 @ 05:25]              22.9     133  |  103  |  36  ----------------------------<  184      [06-16-20 @ 05:25]  5.4   |  19  |  1.72        Ca     10.7     [06-16-20 @ 05:25]    TPro  6.2  /  Alb  3.4  /  TBili  10.6  /  DBili  x   /  AST  24  /  ALT  18  /  AlkPhos  129  [06-15-20 @ 05:47]    PT/INR: PT 25.1 , INR 2.17       [06-15-20 @ 08:13]      Creatinine Trend:  SCr 1.72 [06-16 @ 05:25]  SCr 1.68 [06-15 @ 05:47]  SCr 1.74 [06-14 @ 07:17]  SCr 1.75 [06-13 @ 07:29]  SCr 1.55 [06-12 @ 11:13]              Urinalysis - [06-11-20 @ 19:57]      Color Yellow / Appearance Clear / SG 1.013 / pH 5.0      Gluc Negative / Ketone Negative  / Bili Negative / Urobili 2 mg/dL       Blood Small / Protein Negative / Leuk Est Large / Nitrite Negative      RBC 4 / WBC 54 / Hyaline 0 / Gran  / Sq Epi  / Non Sq Epi 0 / Bacteria Many    Urine Creatinine 61      [06-11-20 @ 19:57]  Urine Protein 10      [06-11-20 @ 19:57]  Urine Sodium 92      [06-14-20 @ 10:52]  Urine Urea Nitrogen 450      [06-12-20 @ 02:46]    Iron 160, TIBC Unable to calculate Test Repeated, %sat Unable to calculate Test Repeated      [06-12-20 @ 09:04]  Ferritin 493      [04-29-20 @ 08:20]  PTH -- (Ca 10.3)      [12-13-19 @ 08:11]   12  Vitamin D (25OH) 25.3      [12-13-19 @ 08:11]  HbA1c 6.4      [02-12-20 @ 17:08]  TSH 3.26      [04-26-20 @ 09:47]  Lipid: chol 71, TG 63, HDL 30, LDL 28      [11-27-19 @ 09:11]    HBsAb Nonreact      [06-13-20 @ 10:35]  HBsAg Nonreact      [06-13-20 @ 10:35]  HBcAb Nonreact      [06-13-20 @ 10:35]      rth

## 2020-06-16 NOTE — PROGRESS NOTE ADULT - SUBJECTIVE AND OBJECTIVE BOX
Chief Complaint:  Patient is a 64y old  Male who presents with a chief complaint of Liver failure, hepatic encephalopathy (16 Jun 2020 12:08)    Reason for consult: AMS, cirrhosis    Interval Events: Pt had several bowel movements, feels less confused.     Hospital Medications:  albumin human 25% IVPB 100 milliLiter(s) IV Intermittent every 6 hours  dextrose 40% Gel 15 Gram(s) Oral once PRN  dextrose 5%. 1000 milliLiter(s) IV Continuous <Continuous>  dextrose 50% Injectable 12.5 Gram(s) IV Push once  dextrose 50% Injectable 25 Gram(s) IV Push once  dextrose 50% Injectable 25 Gram(s) IV Push once  folic acid 1 milliGRAM(s) Oral daily  glucagon  Injectable 1 milliGRAM(s) IntraMuscular once PRN  insulin glargine Injectable (LANTUS) 4 Unit(s) SubCutaneous at bedtime  insulin lispro (HumaLOG) corrective regimen sliding scale   SubCutaneous three times a day before meals  insulin lispro (HumaLOG) corrective regimen sliding scale   SubCutaneous at bedtime  insulin lispro Injectable (HumaLOG) 3 Unit(s) SubCutaneous three times a day before meals  lactulose Syrup 30 Gram(s) Oral every 6 hours  linezolid    Tablet 600 milliGRAM(s) Oral every 12 hours  midodrine. 20 milliGRAM(s) Oral three times a day  nystatin    Suspension 363928 Unit(s) Oral four times a day  nystatin Cream 1 Application(s) Topical two times a day  ondansetron Injectable 4 milliGRAM(s) IV Push every 8 hours  pantoprazole    Tablet 40 milliGRAM(s) Oral before breakfast  polyethylene glycol 3350 17 Gram(s) Oral daily  rifAXIMin 550 milliGRAM(s) Oral two times a day  silver sulfADIAZINE 1% Cream 1 Application(s) Topical two times a day  tamsulosin 0.4 milliGRAM(s) Oral at bedtime  traMADol 25 milliGRAM(s) Oral two times a day PRN  trimethoprim  160 mG/sulfamethoxazole 800 mG 1 Tablet(s) Oral daily  ursodiol Capsule 300 milliGRAM(s) Oral two times a day      ROS:   General:  No  fevers, chills, night sweats, fatigue  Eyes:  Good vision, no reported pain  ENT:  No sore throat, pain, runny nose  CV:  No pain, palpitations  Pulm:  No dyspnea, cough  GI:  See HPI, otherwise negative  :  No  incontinence, nocturia  Muscle:  No pain, weakness  Neuro:  No memory problems  Psych:  No insomnia, mood problems, depression  Endocrine:  No polyuria, polydipsia, cold/heat intolerance  Heme:  No petechiae, ecchymosis, easy bruisability  Skin:  No rash    PHYSICAL EXAM:   Vital Signs:  Vital Signs Last 24 Hrs  T(C): 36.4 (16 Jun 2020 08:19), Max: 36.9 (15 Rachid 2020 15:44)  T(F): 97.6 (16 Jun 2020 08:19), Max: 98.4 (15 Rachid 2020 15:44)  HR: 85 (16 Jun 2020 10:10) (81 - 94)  BP: 135/68 (16 Jun 2020 10:10) (112/64 - 135/68)  BP(mean): --  RR: 18 (16 Jun 2020 10:10) (18 - 18)  SpO2: 100% (16 Jun 2020 10:10) (100% - 100%)  Daily     Daily     GENERAL: no acute distress  NEURO: alert, oriented x 2, + asterixis  HEENT: anicteric sclera, no conjunctival pallor appreciated  CHEST: no respiratory distress, no accessory muscle use  CARDIAC: regular rate, rhythm  ABDOMEN: soft, non-tender, non-distended, no rebound or guarding  EXTREMITIES: warm, well perfused, no edema  SKIN: no lesions noted    LABS: reviewed                        7.7    4.79  )-----------( 61       ( 16 Jun 2020 05:25 )             22.9     06-16    133<L>  |  103  |  36<H>  ----------------------------<  184<H>  5.4<H>   |  19<L>  |  1.72<H>    Ca    10.7<H>      16 Jun 2020 05:25    TPro  6.2  /  Alb  3.4  /  TBili  10.6<H>  /  DBili  x   /  AST  24  /  ALT  18  /  AlkPhos  129<H>  06-15    LIVER FUNCTIONS - ( 15 Rachid 2020 05:47 )  Alb: 3.4 g/dL / Pro: 6.2 g/dL / ALK PHOS: 129 U/L / ALT: 18 U/L / AST: 24 U/L / GGT: x             Interval Diagnostic Studies: see sunrise for full report Chief Complaint:  Patient is a 64y old  Male who presents with a chief complaint of Liver failure, hepatic encephalopathy (16 Jun 2020 12:08)    Reason for consult: AMS, cirrhosis    Interval Events: Pt had several bowel movements, feels less confused.     Hospital Medications:  albumin human 25% IVPB 100 milliLiter(s) IV Intermittent every 6 hours  dextrose 40% Gel 15 Gram(s) Oral once PRN  dextrose 5%. 1000 milliLiter(s) IV Continuous <Continuous>  dextrose 50% Injectable 12.5 Gram(s) IV Push once  dextrose 50% Injectable 25 Gram(s) IV Push once  dextrose 50% Injectable 25 Gram(s) IV Push once  folic acid 1 milliGRAM(s) Oral daily  glucagon  Injectable 1 milliGRAM(s) IntraMuscular once PRN  insulin glargine Injectable (LANTUS) 4 Unit(s) SubCutaneous at bedtime  insulin lispro (HumaLOG) corrective regimen sliding scale   SubCutaneous three times a day before meals  insulin lispro (HumaLOG) corrective regimen sliding scale   SubCutaneous at bedtime  insulin lispro Injectable (HumaLOG) 3 Unit(s) SubCutaneous three times a day before meals  lactulose Syrup 30 Gram(s) Oral every 6 hours  linezolid    Tablet 600 milliGRAM(s) Oral every 12 hours  midodrine. 20 milliGRAM(s) Oral three times a day  nystatin    Suspension 362626 Unit(s) Oral four times a day  nystatin Cream 1 Application(s) Topical two times a day  ondansetron Injectable 4 milliGRAM(s) IV Push every 8 hours  pantoprazole    Tablet 40 milliGRAM(s) Oral before breakfast  polyethylene glycol 3350 17 Gram(s) Oral daily  rifAXIMin 550 milliGRAM(s) Oral two times a day  silver sulfADIAZINE 1% Cream 1 Application(s) Topical two times a day  tamsulosin 0.4 milliGRAM(s) Oral at bedtime  traMADol 25 milliGRAM(s) Oral two times a day PRN  trimethoprim  160 mG/sulfamethoxazole 800 mG 1 Tablet(s) Oral daily  ursodiol Capsule 300 milliGRAM(s) Oral two times a day      ROS:   General:  No  fevers, chills, night sweats, fatigue  Eyes:  Good vision, no reported pain  ENT:  No sore throat, pain, runny nose  CV:  No pain, palpitations  Pulm:  No dyspnea, cough  GI:  See HPI, otherwise negative  :  No  incontinence, nocturia  Muscle:  No pain, weakness  Neuro:  No memory problems  Psych:  No insomnia, mood problems, depression  Endocrine:  No polyuria, polydipsia, cold/heat intolerance  Heme:  No petechiae, ecchymosis, easy bruisability  Skin:  No rash    PHYSICAL EXAM:   Vital Signs:  Vital Signs Last 24 Hrs  T(C): 36.4 (16 Jun 2020 08:19), Max: 36.9 (15 Rachid 2020 15:44)  T(F): 97.6 (16 Jun 2020 08:19), Max: 98.4 (15 Rachid 2020 15:44)  HR: 85 (16 Jun 2020 10:10) (81 - 94)  BP: 135/68 (16 Jun 2020 10:10) (112/64 - 135/68)  BP(mean): --  RR: 18 (16 Jun 2020 10:10) (18 - 18)  SpO2: 100% (16 Jun 2020 10:10) (100% - 100%)  Daily     Daily     GENERAL: no acute distress  NEURO: alert, oriented x 2, + asterixis, recognized physician/team but unable to name his 4 children  HEENT: anicteric sclera, no conjunctival pallor appreciated  CHEST: no respiratory distress, no accessory muscle use  CARDIAC: regular rate, rhythm  ABDOMEN: soft, non-tender, non-distended, no rebound or guarding  EXTREMITIES: warm, well perfused, no edema  SKIN: no lesions noted    LABS: reviewed                        7.7    4.79  )-----------( 61       ( 16 Jun 2020 05:25 )             22.9     06-16    133<L>  |  103  |  36<H>  ----------------------------<  184<H>  5.4<H>   |  19<L>  |  1.72<H>    Ca    10.7<H>      16 Jun 2020 05:25    TPro  6.2  /  Alb  3.4  /  TBili  10.6<H>  /  DBili  x   /  AST  24  /  ALT  18  /  AlkPhos  129<H>  06-15    LIVER FUNCTIONS - ( 15 Rachid 2020 05:47 )  Alb: 3.4 g/dL / Pro: 6.2 g/dL / ALK PHOS: 129 U/L / ALT: 18 U/L / AST: 24 U/L / GGT: x             Interval Diagnostic Studies: see sunrise for full report

## 2020-06-16 NOTE — PROGRESS NOTE ADULT - PROBLEM SELECTOR PLAN 2
Reported confusion and altered behavior at home  - mental status has been fluctuating. Better this morning  - large BM last night and this morning  - AXR negative for illeus/obstruction  - continue lactulose 30 q6hr, added miralax  - continue rifaximin 550 BID  - monitor mental status closely  - check ammonia level

## 2020-06-16 NOTE — PROGRESS NOTE ADULT - ASSESSMENT
64 M hx of DM, GERD, HFpEF with mild LV diastolic dysfunction, and decompensated JEAN cirrhosis c/b ascites with hx of SBP, HE, duodenal ulcer, GAVE and duodenal AVM s/p APC, recent admission for ESBL E coli prostatic abscess 2/2 4 wks ertapenem, hx of ESBL UTIs, recent VRE urinary colonization, admitted for acute hepatic encephalopathy likely in setting of VRE UTI

## 2020-06-16 NOTE — PROVIDER CONTACT NOTE (OTHER) - ASSESSMENT
Pt had multiple BMs loose- 6 in all and vomited- potassium was only 5.4, normal =5.3- with the loose stool and emesis potassium should be lower at this time.

## 2020-06-16 NOTE — PROGRESS NOTE ADULT - ASSESSMENT
Pt with MISA likely 2/2 pre renal and/or ATN in setting hypotension, less likely HRS (Concepcion>35, no response tx)    #Acute Kidney Injury  - MISA likely 2/2 pre renal ATN in setting hypotension, decrease PO intake, need rule out obstruction, less likely HRS (Concepcion>35, no response tx)  - Urine Na 112 ~ ATN, UPro/Cr 0.2 gram, UA showed UTI w/o protein/rbc.    - Baseline sCr 1.0 in 5/2020, on admission sCr 1.57 (6/11), uptrend to 1.75 on 6/13. Patient with SCr: 1.72 today   - Borderline hypotension during hospitalization (on midodrine), no response to albumin/midodrine treated, less likely HRS given Concepcion>35.  Good UOP during hospitalization, lasix/aldactone held.    - Prior MISA w/u Dec 2019 showed FLC elevated K/L ratio 2.91 (K>L) and immunofixation showing "one Weak IgA Lambda  Band and One Weak IgG Lambda Band Identified"  - HOLD diuretics  - No absolute indication for dialysis, no fluid overload no refractory electrolyte derangement   - Avoid nephrotoxin meds such as NSAIDS, PPI, ACEI/ARB, and contrast agents.  - Renally dose medications per GFR.    Kia Mendoza  Nephrology Fellow  Pager: [phone]

## 2020-06-16 NOTE — PROGRESS NOTE ADULT - SUBJECTIVE AND OBJECTIVE BOX
Patient is a 64y old  Male who presents with a chief complaint of Liver failure, hepatic encephalopathy (15 Rachid 2020 16:05)      SUBJECTIVE / OVERNIGHT EVENTS:  no acute events overnight, vss, afebrile  abdominal xray negative for obstruction/illeus  s/p enema with large BM overnight and this morning  pt more alert, awake this morning, able to answer questions more appropriately  pt has no complaints this morning    ROS:  14 point ROS negative in detail except stated as above    MEDICATIONS  (STANDING):  albumin human 25% IVPB 100 milliLiter(s) IV Intermittent every 6 hours  dextrose 5%. 1000 milliLiter(s) (50 mL/Hr) IV Continuous <Continuous>  dextrose 50% Injectable 12.5 Gram(s) IV Push once  dextrose 50% Injectable 25 Gram(s) IV Push once  dextrose 50% Injectable 25 Gram(s) IV Push once  folic acid 1 milliGRAM(s) Oral daily  insulin glargine Injectable (LANTUS) 4 Unit(s) SubCutaneous at bedtime  insulin lispro (HumaLOG) corrective regimen sliding scale   SubCutaneous three times a day before meals  insulin lispro (HumaLOG) corrective regimen sliding scale   SubCutaneous at bedtime  insulin lispro Injectable (HumaLOG) 3 Unit(s) SubCutaneous three times a day before meals  lactulose Syrup 30 Gram(s) Oral every 6 hours  linezolid    Tablet 600 milliGRAM(s) Oral every 12 hours  midodrine. 20 milliGRAM(s) Oral three times a day  nystatin    Suspension 203465 Unit(s) Oral four times a day  nystatin Cream 1 Application(s) Topical two times a day  ondansetron Injectable 4 milliGRAM(s) IV Push every 8 hours  pantoprazole    Tablet 40 milliGRAM(s) Oral before breakfast  polyethylene glycol 3350 17 Gram(s) Oral daily  rifAXIMin 550 milliGRAM(s) Oral two times a day  silver sulfADIAZINE 1% Cream 1 Application(s) Topical two times a day  tamsulosin 0.4 milliGRAM(s) Oral at bedtime  trimethoprim  160 mG/sulfamethoxazole 800 mG 1 Tablet(s) Oral daily  ursodiol Capsule 300 milliGRAM(s) Oral two times a day    MEDICATIONS  (PRN):  dextrose 40% Gel 15 Gram(s) Oral once PRN Blood Glucose LESS THAN 70 milliGRAM(s)/deciliter  glucagon  Injectable 1 milliGRAM(s) IntraMuscular once PRN Glucose LESS THAN 70 milligrams/deciliter  traMADol 25 milliGRAM(s) Oral two times a day PRN Moderate Pain (4 - 6)      CAPILLARY BLOOD GLUCOSE      POCT Blood Glucose.: 192 mg/dL (16 Jun 2020 08:14)  POCT Blood Glucose.: 226 mg/dL (15 Rachid 2020 22:37)  POCT Blood Glucose.: 179 mg/dL (15 Rachid 2020 17:32)  POCT Blood Glucose.: 273 mg/dL (15 Rachid 2020 12:35)    I&O's Summary    15 Rachid 2020 07:01  -  16 Jun 2020 07:00  --------------------------------------------------------  IN: 200 mL / OUT: 951 mL / NET: -751 mL    16 Jun 2020 07:01  -  16 Jun 2020 11:15  --------------------------------------------------------  IN: 100 mL / OUT: 252 mL / NET: -152 mL        PHYSICAL EXAM:  Vital Signs Last 24 Hrs  T(C): 36.4 (16 Jun 2020 08:19), Max: 36.9 (15 Rachid 2020 15:44)  T(F): 97.6 (16 Jun 2020 08:19), Max: 98.4 (15 Rachid 2020 15:44)  HR: 85 (16 Jun 2020 10:10) (81 - 94)  BP: 135/68 (16 Jun 2020 10:10) (112/64 - 135/68)  BP(mean): --  RR: 18 (16 Jun 2020 10:10) (18 - 18)  SpO2: 100% (16 Jun 2020 10:10) (100% - 100%)    GENERAL: NAD, well-developed  HEAD:  Atraumatic, Normocephalic  EYES: EOMI, PERRLA,  (+) scleral icterus  NECK: Supple, No JVD  CHEST/LUNG: Clear to auscultation bilaterally; No wheeze  HEART: Regular rate and rhythm; No murmurs, rubs, or gallops  ABDOMEN: Soft, Nontender, Nondistended; Bowel sounds present  EXTREMITIES:  2+ Peripheral Pulses, No clubbing, cyanosis, or edema  NEUROLOGY: AAOx2 non-focal  SKIN: (+) jaundice    LABS:  personally reviewed                        7.7    4.79  )-----------( 61       ( 16 Jun 2020 05:25 )             22.9     06-16    133<L>  |  103  |  36<H>  ----------------------------<  184<H>  5.4<H>   |  19<L>  |  1.72<H>    Ca    10.7<H>      16 Jun 2020 05:25    TPro  6.2  /  Alb  3.4  /  TBili  10.6<H>  /  DBili  x   /  AST  24  /  ALT  18  /  AlkPhos  129<H>  06-15    PT/INR - ( 15 Rachid 2020 08:13 )   PT: 25.1 sec;   INR: 2.17 ratio                   RADIOLOGY & ADDITIONAL TESTS:    Imaging Personally Reviewed:    Consultant(s) Notes Reviewed:  ID, hepatology    Care Discussed with Consultants/Other Providers: Dr. Pérez (ID), Dr. Mo (hepatology)

## 2020-06-16 NOTE — PROGRESS NOTE ADULT - PROBLEM SELECTOR PLAN 3
Decompensated cirrhosis due to JEAN, MELD-Na 30 (6/15)  - pt on liver transplant list  - EGD in 12/19 showed small varices and GAVE  - POCUS in ED with no pocket amenable to safely perform paracentesis

## 2020-06-16 NOTE — PROGRESS NOTE ADULT - ASSESSMENT
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     CT Pelvis with no evidence of abscess (but noncontrast)  BCx 6/12 with NGTD  UCx 6/14 with gram positive organisms (suspect VRE - had this previously)    Given worsening confusion out of proportion to patient's previous presentations for HE favor covering for VRE  Linezolid CAN interact with Midodrine and Tramadol (potential for serotonin syndrome but low likelihood - would monitor)  Mental status improved over last 24 hours (after initiation of linezolid)  Favor completing 7 day empiric coverage for VRE    RECOMMENDATIONS    #Encephalopathy, Positive UCx, VRE Colonization  --Continue Linezolid 600 mg PO Q12H (End Date: 6/21/20)  --Continue to follow CBC with diff  --Continue to follow renal function (Cr/BUN)  --Continue to follow temperature curve  --Follow up on preliminary blood cultures    #Pre-OLT Evaluation, Encounter for Vaccination  --Not cleared from ID perspective for OLT as unclear if infection contributing to presentation (anticipate clearance over next 24 hours)  --PCV 13 and 23 vaccination completed February 2020  --Will require HBV Vaccination - would give dose prior to discharge    I will continue to follow. Please feel free to contact me with any further questions.    Eusebio Pérez M.D.  [facility] Division of Infectious Disease  8AM-5PM: Pager Number [phone]  After Hours (or if no response): Please contact the Infectious Diseases Office at [phone]     The above assessment and plan were discussed with Dr Cary Rivera

## 2020-06-16 NOTE — PROGRESS NOTE ADULT - ATTENDING COMMENTS
updated wife Ada over the phone - she expressed concern for his quality of life and if he would ever get to transplant since he has been in/out of the hospital every month since last October.     Cary Rivera MD  Division of Hospital Medicine  Cell: [phone]  Pager: [phone]  Office: [phone]

## 2020-06-16 NOTE — PROGRESS NOTE ADULT - ATTENDING COMMENTS
63 y/o man cirrhosis due to JEAN complicated by ascites, history of SBP and encephalopathy. Admitted for worsening encephalopathy consulted for worsening MISA. Baseline creatinine 1mg/dL. Cr peak 1.75mg/dL on 6/13/ Diuretics (Lasix and aldactone) held and he was started on albumin and midodrine. 's, UOP 1.5 liters.   On exam icteric, mental status much better today, alert and communicative, mild asterixes, abdomen distended but not tense, non tender, no extremity edema.     Cr 1.72mg/dL today slightly up. UOP 950ml+    Impession   MISA in the setting of VRE UTI likely ATN. Cr plateaued at 1.72. Has ascites but no other signs of gross fluid overload. BP improved on midodrine     Recommend   Continue midodrine   Continue albumin  Continue to hold diuretics

## 2020-06-17 LAB
AFPL3 RESULTS RECEIVED: NORMAL
ALBUMIN SERPL ELPH-MCNC: 4.1 G/DL — SIGNIFICANT CHANGE UP (ref 3.3–5)
ALP SERPL-CCNC: 114 U/L — SIGNIFICANT CHANGE UP (ref 40–120)
ALPHA-1-FETOPROTEIN-L3: NORMAL %
ALPHA-1-FETOPROTEIN: 1.7 NG/ML
ALT FLD-CCNC: 13 U/L — SIGNIFICANT CHANGE UP (ref 10–45)
ANION GAP SERPL CALC-SCNC: 11 MMOL/L — SIGNIFICANT CHANGE UP (ref 5–17)
APTT BLD: 54.1 SEC — HIGH (ref 27.5–36.3)
AST SERPL-CCNC: 16 U/L — SIGNIFICANT CHANGE UP (ref 10–40)
BASOPHILS # BLD AUTO: 0.03 K/UL — SIGNIFICANT CHANGE UP (ref 0–0.2)
BASOPHILS NFR BLD AUTO: 0.8 % — SIGNIFICANT CHANGE UP (ref 0–2)
BILIRUB SERPL-MCNC: 9.5 MG/DL — HIGH (ref 0.2–1.2)
BLD GP AB SCN SERPL QL: NEGATIVE — SIGNIFICANT CHANGE UP
BUN SERPL-MCNC: 41 MG/DL — HIGH (ref 7–23)
CALCIUM SERPL-MCNC: 10.5 MG/DL — SIGNIFICANT CHANGE UP (ref 8.4–10.5)
CHLORIDE SERPL-SCNC: 100 MMOL/L — SIGNIFICANT CHANGE UP (ref 96–108)
CO2 SERPL-SCNC: 19 MMOL/L — LOW (ref 22–31)
CREAT SERPL-MCNC: 2.13 MG/DL — HIGH (ref 0.5–1.3)
CULTURE RESULTS: SIGNIFICANT CHANGE UP
CULTURE RESULTS: SIGNIFICANT CHANGE UP
EOSINOPHIL # BLD AUTO: 0.1 K/UL — SIGNIFICANT CHANGE UP (ref 0–0.5)
EOSINOPHIL NFR BLD AUTO: 2.8 % — SIGNIFICANT CHANGE UP (ref 0–6)
GLUCOSE SERPL-MCNC: 144 MG/DL — HIGH (ref 70–99)
HCT VFR BLD CALC: 19.4 % — CRITICAL LOW (ref 39–50)
HCT VFR BLD CALC: 23.4 % — LOW (ref 39–50)
HGB BLD-MCNC: 6.5 G/DL — CRITICAL LOW (ref 13–17)
HGB BLD-MCNC: 7.9 G/DL — LOW (ref 13–17)
IMM GRANULOCYTES NFR BLD AUTO: 0.3 % — SIGNIFICANT CHANGE UP (ref 0–1.5)
INR BLD: 2.55 RATIO — HIGH (ref 0.88–1.16)
LYMPHOCYTES # BLD AUTO: 1.19 K/UL — SIGNIFICANT CHANGE UP (ref 1–3.3)
LYMPHOCYTES # BLD AUTO: 33.7 % — SIGNIFICANT CHANGE UP (ref 13–44)
MCHC RBC-ENTMCNC: 33.5 GM/DL — SIGNIFICANT CHANGE UP (ref 32–36)
MCHC RBC-ENTMCNC: 33.8 GM/DL — SIGNIFICANT CHANGE UP (ref 32–36)
MCHC RBC-ENTMCNC: 35.1 PG — HIGH (ref 27–34)
MCHC RBC-ENTMCNC: 36.1 PG — HIGH (ref 27–34)
MCV RBC AUTO: 104 FL — HIGH (ref 80–100)
MCV RBC AUTO: 107.8 FL — HIGH (ref 80–100)
MONOCYTES # BLD AUTO: 0.3 K/UL — SIGNIFICANT CHANGE UP (ref 0–0.9)
MONOCYTES NFR BLD AUTO: 8.5 % — SIGNIFICANT CHANGE UP (ref 2–14)
NEUTROPHILS # BLD AUTO: 1.9 K/UL — SIGNIFICANT CHANGE UP (ref 1.8–7.4)
NEUTROPHILS NFR BLD AUTO: 53.9 % — SIGNIFICANT CHANGE UP (ref 43–77)
NRBC # BLD: 0 /100 WBCS — SIGNIFICANT CHANGE UP (ref 0–0)
NRBC # BLD: 0 /100 WBCS — SIGNIFICANT CHANGE UP (ref 0–0)
PLATELET # BLD AUTO: 47 K/UL — LOW (ref 150–400)
PLATELET # BLD AUTO: 49 K/UL — LOW (ref 150–400)
POTASSIUM SERPL-MCNC: 4.9 MMOL/L — SIGNIFICANT CHANGE UP (ref 3.5–5.3)
POTASSIUM SERPL-SCNC: 4.9 MMOL/L — SIGNIFICANT CHANGE UP (ref 3.5–5.3)
PROT SERPL-MCNC: 6.1 G/DL — SIGNIFICANT CHANGE UP (ref 6–8.3)
PROTHROM AB SERPL-ACNC: 30.2 SEC — HIGH (ref 10–13.1)
RBC # BLD: 1.8 M/UL — LOW (ref 4.2–5.8)
RBC # BLD: 2.25 M/UL — LOW (ref 4.2–5.8)
RBC # FLD: 17.6 % — HIGH (ref 10.3–14.5)
RBC # FLD: 21.2 % — HIGH (ref 10.3–14.5)
RH IG SCN BLD-IMP: POSITIVE — SIGNIFICANT CHANGE UP
SODIUM SERPL-SCNC: 130 MMOL/L — LOW (ref 135–145)
SPECIMEN SOURCE: SIGNIFICANT CHANGE UP
SPECIMEN SOURCE: SIGNIFICANT CHANGE UP
WBC # BLD: 3.25 K/UL — LOW (ref 3.8–10.5)
WBC # BLD: 3.53 K/UL — LOW (ref 3.8–10.5)
WBC # FLD AUTO: 3.25 K/UL — LOW (ref 3.8–10.5)
WBC # FLD AUTO: 3.53 K/UL — LOW (ref 3.8–10.5)

## 2020-06-17 PROCEDURE — 99233 SBSQ HOSP IP/OBS HIGH 50: CPT

## 2020-06-17 PROCEDURE — 99232 SBSQ HOSP IP/OBS MODERATE 35: CPT

## 2020-06-17 PROCEDURE — 99232 SBSQ HOSP IP/OBS MODERATE 35: CPT | Mod: GC

## 2020-06-17 RX ORDER — ACETAMINOPHEN 500 MG
650 TABLET ORAL EVERY 8 HOURS
Refills: 0 | Status: DISCONTINUED | OUTPATIENT
Start: 2020-06-17 | End: 2020-06-21

## 2020-06-17 RX ORDER — PANTOPRAZOLE SODIUM 20 MG/1
40 TABLET, DELAYED RELEASE ORAL EVERY 12 HOURS
Refills: 0 | Status: DISCONTINUED | OUTPATIENT
Start: 2020-06-17 | End: 2020-06-21

## 2020-06-17 RX ADMIN — ONDANSETRON 4 MILLIGRAM(S): 8 TABLET, FILM COATED ORAL at 06:04

## 2020-06-17 RX ADMIN — URSODIOL 300 MILLIGRAM(S): 250 TABLET, FILM COATED ORAL at 06:03

## 2020-06-17 RX ADMIN — PANTOPRAZOLE SODIUM 40 MILLIGRAM(S): 20 TABLET, DELAYED RELEASE ORAL at 12:00

## 2020-06-17 RX ADMIN — Medication 3 UNIT(S): at 13:09

## 2020-06-17 RX ADMIN — ONDANSETRON 4 MILLIGRAM(S): 8 TABLET, FILM COATED ORAL at 13:12

## 2020-06-17 RX ADMIN — LINEZOLID 600 MILLIGRAM(S): 600 INJECTION, SOLUTION INTRAVENOUS at 06:04

## 2020-06-17 RX ADMIN — Medication 50 MILLILITER(S): at 17:35

## 2020-06-17 RX ADMIN — Medication 500000 UNIT(S): at 06:04

## 2020-06-17 RX ADMIN — Medication 1: at 08:29

## 2020-06-17 RX ADMIN — LACTULOSE 30 GRAM(S): 10 SOLUTION ORAL at 13:11

## 2020-06-17 RX ADMIN — Medication 1 TABLET(S): at 13:12

## 2020-06-17 RX ADMIN — Medication 1: at 21:37

## 2020-06-17 RX ADMIN — URSODIOL 300 MILLIGRAM(S): 250 TABLET, FILM COATED ORAL at 17:41

## 2020-06-17 RX ADMIN — Medication 50 MILLILITER(S): at 23:59

## 2020-06-17 RX ADMIN — Medication 1: at 13:09

## 2020-06-17 RX ADMIN — NYSTATIN CREAM 1 APPLICATION(S): 100000 CREAM TOPICAL at 17:42

## 2020-06-17 RX ADMIN — MIDODRINE HYDROCHLORIDE 20 MILLIGRAM(S): 2.5 TABLET ORAL at 13:11

## 2020-06-17 RX ADMIN — PANTOPRAZOLE SODIUM 40 MILLIGRAM(S): 20 TABLET, DELAYED RELEASE ORAL at 06:04

## 2020-06-17 RX ADMIN — LINEZOLID 600 MILLIGRAM(S): 600 INJECTION, SOLUTION INTRAVENOUS at 17:40

## 2020-06-17 RX ADMIN — MIDODRINE HYDROCHLORIDE 20 MILLIGRAM(S): 2.5 TABLET ORAL at 17:40

## 2020-06-17 RX ADMIN — TAMSULOSIN HYDROCHLORIDE 0.4 MILLIGRAM(S): 0.4 CAPSULE ORAL at 21:38

## 2020-06-17 RX ADMIN — Medication 500000 UNIT(S): at 17:38

## 2020-06-17 RX ADMIN — ONDANSETRON 4 MILLIGRAM(S): 8 TABLET, FILM COATED ORAL at 21:38

## 2020-06-17 RX ADMIN — INSULIN GLARGINE 4 UNIT(S): 100 INJECTION, SOLUTION SUBCUTANEOUS at 21:38

## 2020-06-17 RX ADMIN — Medication 1 APPLICATION(S): at 06:05

## 2020-06-17 RX ADMIN — Medication 1 MILLIGRAM(S): at 13:11

## 2020-06-17 RX ADMIN — LACTULOSE 30 GRAM(S): 10 SOLUTION ORAL at 17:38

## 2020-06-17 RX ADMIN — Medication 1 APPLICATION(S): at 17:42

## 2020-06-17 RX ADMIN — POLYETHYLENE GLYCOL 3350 17 GRAM(S): 17 POWDER, FOR SOLUTION ORAL at 13:12

## 2020-06-17 RX ADMIN — LACTULOSE 30 GRAM(S): 10 SOLUTION ORAL at 06:05

## 2020-06-17 RX ADMIN — Medication 50 MILLILITER(S): at 03:23

## 2020-06-17 RX ADMIN — Medication 3 UNIT(S): at 08:30

## 2020-06-17 RX ADMIN — Medication 3 UNIT(S): at 18:16

## 2020-06-17 RX ADMIN — PANTOPRAZOLE SODIUM 40 MILLIGRAM(S): 20 TABLET, DELAYED RELEASE ORAL at 21:38

## 2020-06-17 RX ADMIN — NYSTATIN CREAM 1 APPLICATION(S): 100000 CREAM TOPICAL at 06:05

## 2020-06-17 RX ADMIN — Medication 50 MILLILITER(S): at 09:45

## 2020-06-17 RX ADMIN — Medication 500000 UNIT(S): at 13:11

## 2020-06-17 RX ADMIN — Medication 3: at 18:16

## 2020-06-17 RX ADMIN — MIDODRINE HYDROCHLORIDE 20 MILLIGRAM(S): 2.5 TABLET ORAL at 06:04

## 2020-06-17 NOTE — PROGRESS NOTE ADULT - ASSESSMENT
Pt with MISA likely 2/2 pre renal and/or ATN in setting hypotension, less likely HRS (Concepcion>35, no response tx)    #Acute Kidney Injury  - MISA likely 2/2 pre renal ATN in setting hypotension, infection, decrease PO intake, less likely HRS (Concepcion>35, no response tx)  - Urine Na 112 ~ ATN, UPro/Cr 0.2 gram, UA showed UTI w/o protein/rbc.    - Baseline sCr 1.0 in 5/2020, on admission sCr 1.57 (6/11), uptrend to 1.75 on 6/13. Patient with SCr: 2.1 today, likely in setting of low hgb   - Borderline hypotension during hospitalization (on midodrine), no response to albumin/midodrine treated, less likely HRS given Concepcion>35.  Good UOP during hospitalization, lasix/aldactone held.    - Prior MISA w/u Dec 2019 showed FLC elevated K/L ratio 2.91 (K>L) and immunofixation showing "one Weak IgA Lambda  Band and One Weak IgG Lambda Band Identified"  - C/w holding diuretics for now, hold further albumin for now, transfuse 1u PRBC  - No absolute indication for dialysis, no fluid overload no refractory electrolyte derangement   - Avoid nephrotoxin meds such as NSAIDS, PPI, ACEI/ARB, and contrast agents.  - Renally dose medications per GFR.      Abelino Patterson   Nephrology Fellow  Pager NS: [phone]/ [facility]: [phone]  (After 5 pm or on weekends please page the on-call fellow)

## 2020-06-17 NOTE — DIETITIAN INITIAL EVALUATION ADULT. - NUTRITION DIAGNOSIS
[FreeTextEntry1] : I have reviewed the patient's CAT scan of the chest. There are fibronodular changes at the apices right greater than left. I don't see a lot of parenchymal emphysema otherwise. There are no masses or nodules. In terms of her spirometry this reveals a borderline mild obstruction consistent with small airway disease. I feel this patient has mild small airway disease to do her smoking history. He found fibronodular disease at the apices some unclear etiology but is probably post inflammatory. I feel there is no suggestion of the malignancy. Patient is reassured. She was told that she must continue to stop smoking. I feel there is no indication for bronchodilators. Patient is to followup in one year with a repeat CAT scan of the chest. She's been reassured thank you yes...

## 2020-06-17 NOTE — PROGRESS NOTE ADULT - ATTENDING COMMENTS
65 yo M with IDDM, dyslipidemia, obesity, GERD, HFpEF with mild LV diastolic dysfunction, MGUS, and decompensated JEAN cirrhosis complicated by ascites, history of SBP, hepatic encephalopathy, and chronic anemia with a history of duodenal ulcer as well as GAVE and duodenal AVM s/p APC (last on 10/11/19), who is UNOS listed for liver transplantation at [facility]. 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 He is re-admitted with hepatic encephalopathy and concern for recurrent UTI, also with MISA on CKD.    # Complicated UTI with VRE: Transplant ID appreciated, and receiving 7-day course of linezolid (6/15-21). Also remains on Bactrim prophylaxis (given prior ESBL E. coli infection). Blood cultures remain negative x72h. No contraindication to proceeding with LT from ID standpoint (discussed with Dr. Pérez today).    # Hepatic encephalopathy: Improving, but not yet at his baseline. Continue rifaximin, Miralax, and lactulose and will titrate as needed.    # MISA on CKD: Transplant Nephrology input appreciated. Recommend repeat urinalysis and urine electrolytes for re-evaluation given worsened Cr today. Continue to hold diuretics. Continue IV albumin, and will consider adding octreotide if urine studies consistent with HRS. He is already on midodrine.    # Orthostatic hypotension: Chronic, related to end-stage liver disease. Continue home midodrine.    # Hyponatremia: Continue fluid restriction.    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 Receiving 1 unit PRBCs today for Hb 6.5, and will monitor for appropriate response.    # Decompensated JEAN cirrhosis: He is currently UNOS wait-listed for liver transplantation at [facility], blood type A, with MELD-Na 35 today. Please continue to check MELD labs daily.    Please don't hesitate to call with any questions/concerns.    Letitia Mo M.D., Ph.D.  Transplant Hepatology  Cell: [phone]

## 2020-06-17 NOTE — DIETITIAN INITIAL EVALUATION ADULT. - PROBLEM SELECTOR PLAN 10
Transitions of Care Status:  1.  Name of PCP:    2.  PCP Contacted on Admission: [ ] Y  [ ] N   [ ] N/A  3.  PCP contacted at Discharge:    [ ] Y  [ ] N   [ ] N/A  4.  Post-Discharge Appointment Date and Location:  5.  Summary of Handoff given to PCP:

## 2020-06-17 NOTE — DIETITIAN INITIAL EVALUATION ADULT. - PERTINENT MEDS FT
MEDICATIONS  (STANDING):  albumin human 25% IVPB 100 milliLiter(s) IV Intermittent every 6 hours  dextrose 5%. 1000 milliLiter(s) (50 mL/Hr) IV Continuous <Continuous>  dextrose 50% Injectable 12.5 Gram(s) IV Push once  dextrose 50% Injectable 25 Gram(s) IV Push once  dextrose 50% Injectable 25 Gram(s) IV Push once  folic acid 1 milliGRAM(s) Oral daily  hepatitis B Vaccine - Adult (ENGERIX-B) 20 MICROGram(s) IntraMuscular once  insulin glargine Injectable (LANTUS) 4 Unit(s) SubCutaneous at bedtime  insulin lispro (HumaLOG) corrective regimen sliding scale   SubCutaneous three times a day before meals  insulin lispro (HumaLOG) corrective regimen sliding scale   SubCutaneous at bedtime  insulin lispro Injectable (HumaLOG) 3 Unit(s) SubCutaneous three times a day before meals  lactulose Syrup 30 Gram(s) Oral every 6 hours  linezolid    Tablet 600 milliGRAM(s) Oral every 12 hours  midodrine. 20 milliGRAM(s) Oral three times a day  nystatin    Suspension 468870 Unit(s) Oral four times a day  nystatin Cream 1 Application(s) Topical two times a day  ondansetron Injectable 4 milliGRAM(s) IV Push every 8 hours  pantoprazole  Injectable 40 milliGRAM(s) IV Push every 12 hours  polyethylene glycol 3350 17 Gram(s) Oral daily  rifAXIMin 550 milliGRAM(s) Oral two times a day  silver sulfADIAZINE 1% Cream 1 Application(s) Topical two times a day  tamsulosin 0.4 milliGRAM(s) Oral at bedtime  trimethoprim  160 mG/sulfamethoxazole 800 mG 1 Tablet(s) Oral daily  ursodiol Capsule 300 milliGRAM(s) Oral two times a day    MEDICATIONS  (PRN):  dextrose 40% Gel 15 Gram(s) Oral once PRN Blood Glucose LESS THAN 70 milliGRAM(s)/deciliter  glucagon  Injectable 1 milliGRAM(s) IntraMuscular once PRN Glucose LESS THAN 70 milligrams/deciliter  traMADol 25 milliGRAM(s) Oral two times a day PRN Moderate Pain (4 - 6)

## 2020-06-17 NOTE — PROGRESS NOTE ADULT - PROBLEM SELECTOR PLAN 5
SCr trending up to 2.1 this morning  - net positive 750cc  - appreciate nephrology recs: no indication for RRT at this time  - continue to hold diuretics  - bob in place  - continue to monitor BMP

## 2020-06-17 NOTE — DIETITIAN INITIAL EVALUATION ADULT. - PROBLEM SELECTOR PLAN 2
Reported confusion and altered behavior at home  Now at baseline mental status, reported compliance with lactulose/rifaximin at home  - r/o UTI, c/w suppressive Bactrim   - R heel ulcer is not infected.  - C/w lactulose 10g Q8, aim for at least 3 BM per day  - c/w rifaximin 550mh PO BID  - Treat MISA as below.   - GI assistance appreciated.

## 2020-06-17 NOTE — PROGRESS NOTE ADULT - SUBJECTIVE AND OBJECTIVE BOX
Patient is a 64y old  Male who presents with a chief complaint of Liver failure, hepatic encephalopathy (17 Jun 2020 10:52)      SUBJECTIVE / OVERNIGHT EVENTS:  no acute events overnight, vss, afebrile  hgb 6.5 this morning  pt without any active bleeding, brown stools  pt is awake/alert this morning, requesting to tell his wife Ada that his phone is out of battery so he couldn't call her  he has no complaints this morning    ROS:  14 point ROS negative in detail except stated as above    MEDICATIONS  (STANDING):  albumin human 25% IVPB 100 milliLiter(s) IV Intermittent every 6 hours  dextrose 5%. 1000 milliLiter(s) (50 mL/Hr) IV Continuous <Continuous>  dextrose 50% Injectable 12.5 Gram(s) IV Push once  dextrose 50% Injectable 25 Gram(s) IV Push once  dextrose 50% Injectable 25 Gram(s) IV Push once  folic acid 1 milliGRAM(s) Oral daily  hepatitis B Vaccine - Adult (ENGERIX-B) 20 MICROGram(s) IntraMuscular once  insulin glargine Injectable (LANTUS) 4 Unit(s) SubCutaneous at bedtime  insulin lispro (HumaLOG) corrective regimen sliding scale   SubCutaneous three times a day before meals  insulin lispro (HumaLOG) corrective regimen sliding scale   SubCutaneous at bedtime  insulin lispro Injectable (HumaLOG) 3 Unit(s) SubCutaneous three times a day before meals  lactulose Syrup 30 Gram(s) Oral every 6 hours  linezolid    Tablet 600 milliGRAM(s) Oral every 12 hours  midodrine. 20 milliGRAM(s) Oral three times a day  nystatin    Suspension 289623 Unit(s) Oral four times a day  nystatin Cream 1 Application(s) Topical two times a day  ondansetron Injectable 4 milliGRAM(s) IV Push every 8 hours  pantoprazole  Injectable 40 milliGRAM(s) IV Push every 12 hours  polyethylene glycol 3350 17 Gram(s) Oral daily  rifAXIMin 550 milliGRAM(s) Oral two times a day  silver sulfADIAZINE 1% Cream 1 Application(s) Topical two times a day  tamsulosin 0.4 milliGRAM(s) Oral at bedtime  trimethoprim  160 mG/sulfamethoxazole 800 mG 1 Tablet(s) Oral daily  ursodiol Capsule 300 milliGRAM(s) Oral two times a day    MEDICATIONS  (PRN):  dextrose 40% Gel 15 Gram(s) Oral once PRN Blood Glucose LESS THAN 70 milliGRAM(s)/deciliter  glucagon  Injectable 1 milliGRAM(s) IntraMuscular once PRN Glucose LESS THAN 70 milligrams/deciliter  traMADol 25 milliGRAM(s) Oral two times a day PRN Moderate Pain (4 - 6)      CAPILLARY BLOOD GLUCOSE      POCT Blood Glucose.: 162 mg/dL (17 Jun 2020 08:21)  POCT Blood Glucose.: 85 mg/dL (16 Jun 2020 21:34)  POCT Blood Glucose.: 286 mg/dL (16 Jun 2020 17:14)  POCT Blood Glucose.: 240 mg/dL (16 Jun 2020 13:55)    I&O's Summary    16 Jun 2020 07:01  -  17 Jun 2020 07:00  --------------------------------------------------------  IN: 2500 mL / OUT: 1256 mL / NET: 1244 mL    17 Jun 2020 07:01  -  17 Jun 2020 11:54  --------------------------------------------------------  IN: 100 mL / OUT: 0 mL / NET: 100 mL        PHYSICAL EXAM:  Vital Signs Last 24 Hrs  T(C): 36.3 (17 Jun 2020 08:39), Max: 36.8 (17 Jun 2020 00:19)  T(F): 97.3 (17 Jun 2020 08:39), Max: 98.3 (17 Jun 2020 00:19)  HR: 82 (17 Jun 2020 08:39) (82 - 85)  BP: 106/51 (17 Jun 2020 08:39) (106/51 - 118/56)  BP(mean): --  RR: 18 (17 Jun 2020 08:39) (18 - 18)  SpO2: 98% (17 Jun 2020 08:39) (98% - 100%)    GENERAL: NAD, well-developed  HEAD:  Atraumatic, Normocephalic  EYES: EOMI, PERRLA, (+) scleral icterus  NECK: Supple, No JVD  CHEST/LUNG: Clear to auscultation bilaterally; No wheeze  HEART: Regular rate and rhythm; No murmurs, rubs, or gallops  ABDOMEN: Soft, Nontender, Nondistended; Bowel sounds present  EXTREMITIES:  2+ Peripheral Pulses, No clubbing, cyanosis, or edema  NEUROLOGY: AAOx3; non-focal  SKIN: (+) jaundice    LABS:  personally reviewed                        6.5    3.53  )-----------( 47       ( 17 Jun 2020 07:08 )             19.4     06-17    130<L>  |  100  |  41<H>  ----------------------------<  144<H>  4.9   |  19<L>  |  2.13<H>    Ca    10.5      17 Jun 2020 07:08    TPro  6.1  /  Alb  4.1  /  TBili  9.5<H>  /  DBili  x   /  AST  16  /  ALT  13  /  AlkPhos  114  06-17    PT/INR - ( 17 Jun 2020 09:35 )   PT: 30.2 sec;   INR: 2.55 ratio         PTT - ( 17 Jun 2020 09:35 )  PTT:54.1 sec          RADIOLOGY & ADDITIONAL TESTS:    Imaging Personally Reviewed:    Consultant(s) Notes Reviewed:  hepatology, ID, nephrology    Care Discussed with Consultants/Other Providers: Dr. Dominguez (hepatology)

## 2020-06-17 NOTE — DIETITIAN INITIAL EVALUATION ADULT. - PHYSICAL APPEARANCE
well nourished/other (specify) Skin: no pressure injuries   Edema: none at this time     ht: 6'1", wt: 238 pounds, BMI: 31.4kg/m2, IBW: 184pounds +/- 10%

## 2020-06-17 NOTE — PROGRESS NOTE ADULT - PROBLEM SELECTOR PLAN 1
Multifactorial: Chronic slow bleed 2/2 known AVM/duodenal GAVE and mild iron deficiency/spur cell anemia in setting on decompensated cirrhosis;   - hgb dropped to 6.5 this morning  - transfuse 1 unit of pRBC and monitor cbc q12hr  - no signs of active bleed at this time: low suspicion for GIB Multifactorial: Chronic slow bleed 2/2 known AVM/duodenal GAVE and mild iron deficiency/spur cell anemia in setting on decompensated cirrhosis;   - hgb dropped to 6.5 this morning  - transfuse 1 unit of pRBC and monitor cbc q12hr  - no signs of active bleed at this time: low suspicion for active GIB

## 2020-06-17 NOTE — PROGRESS NOTE ADULT - PROBLEM SELECTOR PLAN 3
Reported confusion and altered behavior at home  - mental status has been fluctuating. Better this morning  - large BM last night and this morning  - AXR negative for illeus/obstruction  - continue lactulose 30 q6hr, added miralax  - continue rifaximin 550 BID  - monitor mental status closely

## 2020-06-17 NOTE — PROGRESS NOTE ADULT - SUBJECTIVE AND OBJECTIVE BOX
Follow Up:  Encephalopathy    Interval History: afebrile overnight. remains alert today and more appropriate than yesterday. continued large BM     REVIEW OF SYSTEMS  [  ] ROS unobtainable because:    [x  ] All other systems negative except as noted below    Constitutional:  [ ] fever [ ] chills  [ ] weight loss  [ ] weakness  Skin:  [ ] rash [ ] phlebitis	  Eyes: [ ] icterus [ ] pain  [ ] discharge	  ENMT: [ ] sore throat  [ ] thrush [ ] ulcers [ ] exudates  Respiratory: [ ] dyspnea [ ] hemoptysis [ ] cough [ ] sputum	  Cardiovascular:  [ ] chest pain [ ] palpitations [ ] edema	  Gastrointestinal:  [ ] nausea [ ] vomiting [ ] diarrhea [ ] constipation [ ] pain	  Genitourinary:  [ ] dysuria [ ] frequency [ ] hematuria [ ] discharge [ ] flank pain  [ ] incontinence  Musculoskeletal:  [ ] myalgias [ ] arthralgias [ ] arthritis  [ ] back pain  Neurological:  [ ] headache [ ] seizures  [ ] confusion/altered mental status    Allergies  codeine (Anaphylaxis)        ANTIMICROBIALS:  linezolid    Tablet 600 every 12 hours  nystatin    Suspension [id number] four times a day  rifAXIMin 550 two times a day  trimethoprim  160 mG/sulfamethoxazole 800 mG 1 daily      OTHER MEDS:  MEDICATIONS  (STANDING):  acetaminophen   Tablet .. 650 every 8 hours PRN  dextrose 40% Gel 15 once PRN  dextrose 50% Injectable 12.5 once  dextrose 50% Injectable 25 once  dextrose 50% Injectable 25 once  glucagon  Injectable 1 once PRN  hepatitis B Vaccine - Adult (ENGERIX-B) 20 once  insulin glargine Injectable (LANTUS) 4 at bedtime  insulin lispro (HumaLOG) corrective regimen sliding scale  three times a day before meals  insulin lispro (HumaLOG) corrective regimen sliding scale  at bedtime  insulin lispro Injectable (HumaLOG) 3 three times a day before meals  lactulose Syrup 30 every 6 hours  midodrine. 20 three times a day  ondansetron Injectable 4 every 8 hours  pantoprazole  Injectable 40 every 12 hours  polyethylene glycol 3350 17 daily  tamsulosin 0.4 at bedtime  ursodiol Capsule 300 two times a day      Vital Signs Last 24 Hrs  T(C): 36.8 (17 Jun 2020 14:55), Max: 36.8 (17 Jun 2020 00:19)  T(F): 98.2 (17 Jun 2020 14:55), Max: 98.3 (17 Jun 2020 00:19)  HR: 75 (17 Jun 2020 14:55) (75 - 85)  BP: 117/55 (17 Jun 2020 14:55) (97/59 - 118/56)  BP(mean): --  RR: 18 (17 Jun 2020 14:55) (18 - 18)  SpO2: 100% (17 Jun 2020 14:55) (97% - 100%)    PHYSICAL EXAMINATION:  General: Alert and Awake, oriented 3x, NAD, jaundice  HEENT: PERRL, EOMI, scleral icterus  Neck: Supple  Cardiac: RRR, No M/R/G  Resp: CTAB, No Wh/Rh/Ra  Abdomen: NBS, NT/ND, No HSM, No rigidity or guarding  MSK: 1+ LE edema. No Calf tenderness  : bob  Skin: No rashes or lesions. Skin is warm and dry to the touch.   Neuro: Alert and Awake. oriented 3x CN 2-12 Grossly intact. Moves all four extremities spontaneously.  Psych: Calm, Pleasant, Cooperative                          6.5    3.53  )-----------( 47       ( 17 Jun 2020 07:08 )             19.4       06-17    130<L>  |  100  |  41<H>  ----------------------------<  144<H>  4.9   |  19<L>  |  2.13<H>    Ca    10.5      17 Jun 2020 07:08    TPro  6.1  /  Alb  4.1  /  TBili  9.5<H>  /  DBili  x   /  AST  16  /  ALT  13  /  AlkPhos  114  06-17      MICROBIOLOGY:    .Urine Catheterized  06-14-20   >100,000 CFU/ml Enterococcus faecium (vancomycin resistant)  --  Enterococcus faecium (vancomycin resistant)    .Urine Clean Catch (Midstream)  06-14-20   >100,000 CFU/ml Enterococcus faecium (vancomycin resistant)  --  Enterococcus faecium (vancomycin resistant)    .Blood Blood-Peripheral  06-12-20   No growth to date.  --  --      CMV IgG Antibody: <0.20 U/mL (12-04-19 @ 08:33)  Toxoplasma IgG Screen: <3.0 IU/mL (12-04-19 @ 08:33)    RADIOLOGY:    <The imaging below has been reviewed and visualized by me independently. Findings as detailed in report below>    EXAM:  XR ABDOMEN PORTABLE URGENT 1V                        PROCEDURE DATE:  06/15/2020    Nonobstructive bowel gas pattern.

## 2020-06-17 NOTE — DIETITIAN INITIAL EVALUATION ADULT. - PROBLEM SELECTOR PLAN 1
Decompensated cirrhosis due to JEAN, MELD-Na 30   EGD in 12/19 showed small varices and GAVE  POCUS in ED with no pocket amenable to safely perform paracentesis   GI/Hepatology consult appreciated.   - bilirubin 9.2   - Trend LFT, coags, BMP  - hold diuretics as below.

## 2020-06-17 NOTE — PROGRESS NOTE ADULT - ASSESSMENT
63 y/o M PMHx decompensated JEAN Cirrhosis on transplant list, DMII, HFpEF, recent admission for ESBL E coli prostatic abscess 2/2 4 wks ertapenem, hx of ESBL UTIs, recent VRE urinary colonization came to hospital for worsening jaundice and episode of confusion, resolved PTA.     CT Pelvis with no evidence of abscess (but noncontrast)  BCx 6/12 with NGTD  UCx 6/14 with gram positive organisms (suspect VRE - had this previously)    Given worsening confusion out of proportion to patient's previous presentations for HE favor covering for VRE  Linezolid CAN interact with Midodrine and Tramadol (potential for serotonin syndrome but low likelihood - would monitor)  Mental status improved after initiation of linezolid  Favor completing 7 day empiric coverage for VRE    RECOMMENDATIONS    #Encephalopathy, Positive UCx, VRE Colonization  --Continue Linezolid 600 mg PO Q12H (End Date: 6/21/20)  --Continue to follow CBC with diff  --Continue to follow temperature curve  --Follow up on preliminary blood cultures    #MISA, Prophylactic Antibiotic  --Appreciate Nephro and transplant hepatology recommendations  --Continue to follow renal function (Cr/BUN)  --Continue Bactrim DS PO QD for now for UTI PPx (previous recurrent ESBL E coli infection) - doubt Bactrim etiology of MISA but wo    #Pre-OLT Evaluation, Encounter for Vaccination,  --Not cleared from ID perspective for OLT as unclear if infection contributing to presentation (anticipate clearance over next 24 hours)  --PCV 13 and 23 vaccination completed February 2020  --Will require HBV Vaccination - would give dose prior to discharge    I will continue to follow. Please feel free to contact me with any further questions.    Eusebio Pérez M.D.  [facility] Division of Infectious Disease  8AM-5PM: Pager Number [phone]  After Hours (or if no response): Please contact the Infectious Diseases Office at [phone]     The above assessment and plan were discussed with medicine NP 63 y/o M PMHx decompensated JEAN Cirrhosis on transplant list, DMII, HFpEF, recent admission for ESBL E coli prostatic abscess 2/2 4 wks ertapenem, hx of ESBL UTIs, recent VRE urinary colonization came to hospital for worsening jaundice and episode of confusion, resolved PTA.     CT Pelvis with no evidence of abscess (but noncontrast)  BCx 6/12 with NGTD  UCx 6/14 with gram positive organisms (suspect VRE - had this previously)    Given worsening confusion out of proportion to patient's previous presentations for HE favor covering for VRE  Linezolid CAN interact with Midodrine and Tramadol (potential for serotonin syndrome but low likelihood - would monitor)  Mental status improved after initiation of linezolid  Favor completing 7 day empiric coverage for VRE    RECOMMENDATIONS    #Encephalopathy, Positive UCx, VRE Colonization  --Continue Linezolid 600 mg PO Q12H (End Date: 6/21/20)  --Continue to follow CBC with diff  --Continue to follow temperature curve  --Follow up on preliminary blood cultures    #MISA, Prophylactic Antibiotic  --Appreciate Nephro and transplant hepatology recommendations  --Continue Bactrim DS PO QD for now for UTI PPx (previous recurrent ESBL E coli infection) - doubt Bactrim etiology of MISA but would continue to follow renal function (Cr/BUN)    #Pre-OLT Evaluation, Encounter for Vaccination,  --Not cleared from ID perspective for OLT as unclear if infection contributing to presentation (anticipate clearance over next 24 hours)  --PCV 13 and 23 vaccination completed February 2020  --Will require HBV Vaccination - would give dose prior to discharge    I will continue to follow. Please feel free to contact me with any further questions.    Eusebio Pérez M.D.  [facility] Division of Infectious Disease  8AM-5PM: Pager Number [phone]  After Hours (or if no response): Please contact the Infectious Diseases Office at [phone]     The above assessment and plan were discussed with medicine NP 65 y/o M PMHx decompensated JEAN Cirrhosis on transplant list, DMII, HFpEF, recent admission for ESBL E coli prostatic abscess 2/2 4 wks ertapenem, hx of ESBL UTIs, recent VRE urinary colonization came to hospital for worsening jaundice and episode of confusion, resolved PTA.     CT Pelvis with no evidence of abscess (but noncontrast)  BCx 6/12 with NGTD  UCx 6/14 with gram positive organisms (suspect VRE - had this previously)    Given worsening confusion out of proportion to patient's previous presentations for HE favor covering for VRE  Linezolid CAN interact with Midodrine and Tramadol (potential for serotonin syndrome but low likelihood - would monitor)  Mental status improved after initiation of linezolid  Favor completing 7 day empiric coverage for VRE    RECOMMENDATIONS    #Encephalopathy, Positive UCx, VRE Colonization  --Continue Linezolid 600 mg PO Q12H (End Date: 6/21/20)  --Continue to follow CBC with diff  --Continue to follow temperature curve  --Follow up on preliminary blood cultures    #MISA, Prophylactic Antibiotic  --Appreciate Nephro and transplant hepatology recommendations  --Continue Bactrim DS PO QD for now for UTI PPx (previous recurrent ESBL E coli infection) - doubt Bactrim etiology of MISA but would continue to follow renal function (Cr/BUN)    #Pre-OLT Evaluation, Encounter for Vaccination,  --No absolute ID contraindication for OLT. Patient's mental status has improved and blood cultures are with no growth to date.   --Patient will require Daptomycin and Meropenem for danilo-operative prophylaxis  --PCV 13 and 23 vaccination completed February 2020  --Will require HBV Vaccination - would give dose prior to discharge    I will continue to follow. Please feel free to contact me with any further questions.    Eusebio Pérez M.D.  [facility] Division of Infectious Disease  8AM-5PM: Pager Number [phone]  After Hours (or if no response): Please contact the Infectious Diseases Office at [phone]     The above assessment and plan were discussed with medicine NP

## 2020-06-17 NOTE — PROGRESS NOTE ADULT - PROBLEM SELECTOR PLAN 2
No symptoms, possible chronic colonization. Has been on Meropenem  - care discussed with Dr. Pérez: recommend course of linezolid for VRE UTI for total 7 days  - UCx with VRE  - CTAP on this admission without prostate abscess

## 2020-06-17 NOTE — DIETITIAN INITIAL EVALUATION ADULT. - OTHER INFO
Pt reports good appetite and intake at home, reports he was monitoring his portions, avoiding salt and high salt foods and avoiding high fat foods as well. Reports Finger sticks at home were around the 100s at home.     Pt reports NKFA. States he is unsure of which supplements he was on at home, per chart, pt was on folic acid at home.     Pt reports his last wt PTA was about 238 pounds, noted pt weighed about 250 pounds in March 2020, reports he has been trying to lose wt at home by broiling his meat and avoiding high fat methods of cooking. Pt deferred nutrition focused physical exam at this time, visually well nourished.     Noted pt disoriented at times, at this time was aware of his location and able to answer all questions appropriately.

## 2020-06-17 NOTE — DIETITIAN INITIAL EVALUATION ADULT. - PROBLEM SELECTOR PLAN 6
Unclear etiology EMR reviewed:   Chronic slow bleed 2/2 known AVM/duodenal GAVE vs mild iron deficiency/spur cell anemia in setting on decompensated cirrhosis; also concern for MGUS and Gilbert? given unconjugated bilirubin. Was supposed to have an EGD to eval source of bleeding but was deferred due to COVID  - Present with worsening anemia but asymptomatic   - Trend CBC  - f/u haptoglobin/retic/LDH/direct bilirubin  - Currently with brown stool. Low suspicion for acute gib.

## 2020-06-17 NOTE — PROGRESS NOTE ADULT - ATTENDING COMMENTS
updated wife Ada over the phone    Cary Rivera MD  Division of Hospital Medicine  Cell: [phone]  Pager: [phone]  Office: [phone]

## 2020-06-17 NOTE — PROGRESS NOTE ADULT - SUBJECTIVE AND OBJECTIVE BOX
Chief Complaint:  Patient is a 64y old  Male who presents with a chief complaint of Liver failure, hepatic encephalopathy (2020 11:54)    Reason for consult: Cirrhosis, AMS    Interval Events: Pt had 6BMs, brown, no melena. Hb dropped to 6.5. Pt feels well. Cr slightly worse.     Hospital Medications:  acetaminophen   Tablet .. 650 milliGRAM(s) Oral every 8 hours PRN  albumin human 25% IVPB 100 milliLiter(s) IV Intermittent every 6 hours  dextrose 40% Gel 15 Gram(s) Oral once PRN  dextrose 5%. 1000 milliLiter(s) IV Continuous <Continuous>  dextrose 50% Injectable 12.5 Gram(s) IV Push once  dextrose 50% Injectable 25 Gram(s) IV Push once  dextrose 50% Injectable 25 Gram(s) IV Push once  folic acid 1 milliGRAM(s) Oral daily  glucagon  Injectable 1 milliGRAM(s) IntraMuscular once PRN  hepatitis B Vaccine - Adult (ENGERIX-B) 20 MICROGram(s) IntraMuscular once  insulin glargine Injectable (LANTUS) 4 Unit(s) SubCutaneous at bedtime  insulin lispro (HumaLOG) corrective regimen sliding scale   SubCutaneous three times a day before meals  insulin lispro (HumaLOG) corrective regimen sliding scale   SubCutaneous at bedtime  insulin lispro Injectable (HumaLOG) 3 Unit(s) SubCutaneous three times a day before meals  lactulose Syrup 30 Gram(s) Oral every 6 hours  linezolid    Tablet 600 milliGRAM(s) Oral every 12 hours  midodrine. 20 milliGRAM(s) Oral three times a day  nystatin    Suspension 791558 Unit(s) Oral four times a day  nystatin Cream 1 Application(s) Topical two times a day  ondansetron Injectable 4 milliGRAM(s) IV Push every 8 hours  pantoprazole  Injectable 40 milliGRAM(s) IV Push every 12 hours  polyethylene glycol 3350 17 Gram(s) Oral daily  rifAXIMin 550 milliGRAM(s) Oral two times a day  silver sulfADIAZINE 1% Cream 1 Application(s) Topical two times a day  tamsulosin 0.4 milliGRAM(s) Oral at bedtime  trimethoprim  160 mG/sulfamethoxazole 800 mG 1 Tablet(s) Oral daily  ursodiol Capsule 300 milliGRAM(s) Oral two times a day      ROS:   General:  No  fevers, chills, night sweats, fatigue  Eyes:  Good vision, no reported pain  ENT:  No sore throat, pain, runny nose  CV:  No pain, palpitations  Pulm:  No dyspnea, cough  GI:  See HPI, otherwise negative  :  No  incontinence, nocturia  Muscle:  No pain, weakness  Neuro:  No memory problems  Psych:  No insomnia, mood problems, depression  Endocrine:  No polyuria, polydipsia, cold/heat intolerance  Heme:  No petechiae, ecchymosis, easy bruisability  Skin:  No rash    PHYSICAL EXAM:   Vital Signs:  Vital Signs Last 24 Hrs  T(C): 36.8 (2020 14:55), Max: 36.8 (2020 00:19)  T(F): 98.2 (2020 14:55), Max: 98.3 (2020 00:19)  HR: 75 (2020 14:55) (75 - 85)  BP: 117/55 (2020 14:55) (97/59 - 118/56)  BP(mean): --  RR: 18 (2020 14:55) (18 - 18)  SpO2: 100% (2020 14:55) (97% - 100%)  Daily     Daily Weight in k.4 (2020 11:12)    GENERAL: no acute distress  NEURO: alert, mild asterixis, oriented x2   HEENT: anicteric sclera, no conjunctival pallor appreciated  CHEST: no respiratory distress, no accessory muscle use  CARDIAC: regular rate, rhythm  ABDOMEN: soft, non-tender, non-distended, no rebound or guarding  EXTREMITIES: warm, well perfused, no edema  SKIN: no lesions noted    LABS: reviewed                        6.5    3.53  )-----------( 47       ( 2020 07:08 )             19.4     06-17    130<L>  |  100  |  41<H>  ----------------------------<  144<H>  4.9   |  19<L>  |  2.13<H>    Ca    10.5      2020 07:08    TPro  6.1  /  Alb  4.1  /  TBili  9.5<H>  /  DBili  x   /  AST  16  /  ALT  13  /  AlkPhos  114  06-17    LIVER FUNCTIONS - ( 2020 07:08 )  Alb: 4.1 g/dL / Pro: 6.1 g/dL / ALK PHOS: 114 U/L / ALT: 13 U/L / AST: 16 U/L / GGT: x             Interval Diagnostic Studies: see sunrise for full report

## 2020-06-17 NOTE — DIETITIAN INITIAL EVALUATION ADULT. - PROBLEM SELECTOR PLAN 3
MISA likely in setting of poor PO intake and hypovolemia   - albumin 25% 100ml Q6  - hold diuretics.  - f/u Bladder scan for PVR.   - Check urine lytes.  - C/w home midodrine.

## 2020-06-17 NOTE — DIETITIAN INITIAL EVALUATION ADULT. - PROBLEM SELECTOR PLAN 4
HFpEF (EF 70%)  Appears euvolemic vs possible hypovolemic.   - hold diuretics in setting of dena, reevaluate fluid status tomorrow and restart when able  - Monitor for volume overload while on albumin.

## 2020-06-17 NOTE — PROGRESS NOTE ADULT - ASSESSMENT
64M w/ HFpEF, decompensated JEAN cirrhosis w/ hx of ascites + SBP, GI bleed 2/2 duodenal ulcers, angioectasias, and GAVE, ESBL E. Coli prostatic abscess s/p 4 weeks IV ertapenem, hx of ESBL UTIs and VRE colonization, presenting w/ jaundice and confusion, likely 2/2 HE and infection.    Impression:  #Cirrhosis 2/2 JEAN, decompensated by ascites; listed for liver transplant  -varices: small on last EGD 12/2019, not on BB  -ascites: trace on U/S this admission  -HCC: neg on U/S this admission  -HE: present on exam, on lactulose + rifaximin at home, improving w/ lactulose and tx of infection  -MELD-Na 32 on 6/14  #Anemia w/o overt GI bleeding, Hb down to 6.5, likely due to low level hemolysis from end stage cirrhosis  #R posterior heel wound w/ overlying eschar – no signs of infections, XR neg for gas, evaluated by podiatry  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 per ID    Recommendation:  - no c/f GI bleeding, transfuse 1u pRBC today, trend Hb  - c/w lactulose titrated to 3-4 BMs per day  - c/w miralax  - c/w rifaximin  - f/u transplant ID recs re: antibiotics, agree w/ 7d of linezolid  - trend CMP, CBC, INR daily  - hold diuretics and IV albumin as per Renal recs, trend renal fx, would re-check urine studies  - continue home midodrine therapy  - low Na diet  - rest of care per primary team  - hepatology will follow    Ze Dominguez  Gastroenterology Fellow  Available via [employer]  GI Phone# [phone] ([facility])  Page on-call GI fellow via  from 5pm-8am and on weekends 64M w/ HFpEF, decompensated JEAN cirrhosis w/ hx of ascites + SBP, GI bleed 2/2 duodenal ulcers, angioectasias, and GAVE, ESBL E. Coli prostatic abscess s/p 4 weeks IV ertapenem, hx of ESBL UTIs and VRE colonization, presenting w/ jaundice and confusion, likely 2/2 HE and infection.    Impression:  #Cirrhosis 2/2 JEAN, decompensated by ascites; listed for liver transplant  -varices: small on last EGD 12/2019, not on BB  -ascites: trace on U/S this admission  -HCC: neg on U/S this admission  -HE: present on exam, on lactulose + rifaximin at home, improving w/ lactulose and tx of infection  -MELD-Na 34 on 6/17  #Anemia w/o overt GI bleeding, Hb down to 6.5, likely due to low level hemolysis from end stage cirrhosis  #R posterior heel wound w/ overlying eschar – no signs of infections, XR neg for gas, evaluated by podiatry  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 per ID    Recommendation:  - no c/f GI bleeding, transfuse 1u pRBC today, trend Hb  - c/w lactulose titrated to 3-4 BMs per day  - c/w miralax  - c/w rifaximin  - f/u transplant ID recs re: antibiotics, agree w/ 7d of linezolid  - trend CMP, CBC, INR daily  - hold diuretics and IV albumin as per Renal recs, trend renal fx, would re-check urine studies  - continue home midodrine therapy  - low Na diet  - recommend stopping tramadol   - rest of care per primary team  - hepatology will follow    Ze Dominguez  Gastroenterology Fellow  Available via [employer]  GI Phone# [phone] ([facility])  Page on-call GI fellow via  from 5pm-8am and on weekends 64M w/ HFpEF, decompensated JEAN cirrhosis w/ hx of ascites + SBP, GI bleed 2/2 duodenal ulcers, angioectasias, and GAVE, ESBL E. Coli prostatic abscess s/p 4 weeks IV ertapenem, hx of ESBL UTIs and VRE colonization, presenting w/ jaundice and confusion, likely 2/2 HE and infection.    Impression:  #Cirrhosis 2/2 JEAN, decompensated by ascites; listed for liver transplant  -varices: small on last EGD 12/2019, not on BB  -ascites: trace on U/S this admission  -HCC: neg on U/S this admission  -HE: present on exam, on lactulose + rifaximin at home, improving w/ lactulose and tx of infection  -MELD-Na 35 on 6/17  #Anemia w/o overt GI bleeding, Hb down to 6.5, likely due to low level hemolysis from end stage cirrhosis  #R posterior heel wound w/ overlying eschar – no signs of infections, XR neg for gas, evaluated by podiatry  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 per ID    Recommendation:  - no c/f GI bleeding, transfuse 1u pRBC today, trend Hb  - c/w lactulose titrated to 3-4 BMs per day  - c/w miralax  - c/w rifaximin  - f/u transplant ID recs re: antibiotics, agree w/ 7d of linezolid  - trend CMP, CBC, INR daily  - hold diuretics and IV albumin as per Renal recs, trend renal fx, would re-check urine studies  - continue home midodrine therapy  - low Na diet  - recommend stopping tramadol   - rest of care per primary team  - hepatology will follow    Ze Dominguez  Gastroenterology Fellow  Available via [employer]  GI Phone# [phone] ([facility])  Page on-call GI fellow via  from 5pm-8am and on weekends

## 2020-06-17 NOTE — DIETITIAN INITIAL EVALUATION ADULT. - FACTORS AFF FOOD INTAKE
pt reports good appetite and intake at this time, as per PCA, pt has been eating fairly well at this time; pt denies any chewing/swallowing issues; noted pt S/P multiple BMs yesterday, is on Lactulose

## 2020-06-17 NOTE — PROGRESS NOTE ADULT - SUBJECTIVE AND OBJECTIVE BOX
[facility] DIVISION OF KIDNEY DISEASES AND HYPERTENSION -- FOLLOW UP NOTE  --------------------------------------------------------------------------------  Abelino Patterson   Nephrology Fellow  Pager NS: [phone]/ [facility]: [phone]  (After 5 pm or on weekends please page the on-call fellow)    64M PMHx HFpEF, decompensated cirrhosis 2/2 JEAN (enlisted for liver transplant) c/b ascites, hx SBP, duodenal AVM and DM2 who admitted to [facility] for acute encephalopathy. Nephrology consulted for MISA.  Upon review of [facility], baseline sCr 1.0 in 5/2020). On admission serum creatinine 1.57 on 6/11 which worsened to 1.75 on 6/13.  Urinalysis showed UTI no protein/rbc.  Home medication Lasix and aldactone (held during this hospitalization). On admission, pt was hypotensive and started on midodrine and albumin however serum Cr without improvement.  Of note, prior MISA workup in Dec 2019 showed elevated K/L ratio 2.91 (K>L) and immunofixation showing "one Weak IgA Lambda  Band and One Weak IgG Lambda Band Identified" otherwise normal complement, neg SRAVAN/dsDNA/ANCA.      24 hour events:   Patient started on Lactulose yest  Mental status stable this morning  UO: 1.3L over the past 24 hours  BUN/Scr: 41/2.13mg/dL, Na 130, Hgb 6.5          PAST HISTORY  --------------------------------------------------------------------------------  No significant changes to PMH, PSH, FHx, SHx, unless otherwise noted    ALLERGIES & MEDICATIONS  --------------------------------------------------------------------------------  Allergies    codeine (Anaphylaxis)    Intolerances      Standing Inpatient Medications  albumin human 25% IVPB 100 milliLiter(s) IV Intermittent every 6 hours  dextrose 5%. 1000 milliLiter(s) IV Continuous <Continuous>  dextrose 50% Injectable 12.5 Gram(s) IV Push once  dextrose 50% Injectable 25 Gram(s) IV Push once  dextrose 50% Injectable 25 Gram(s) IV Push once  folic acid 1 milliGRAM(s) Oral daily  hepatitis B Vaccine - Adult (ENGERIX-B) 20 MICROGram(s) IntraMuscular once  insulin glargine Injectable (LANTUS) 4 Unit(s) SubCutaneous at bedtime  insulin lispro (HumaLOG) corrective regimen sliding scale   SubCutaneous three times a day before meals  insulin lispro (HumaLOG) corrective regimen sliding scale   SubCutaneous at bedtime  insulin lispro Injectable (HumaLOG) 3 Unit(s) SubCutaneous three times a day before meals  lactulose Syrup 30 Gram(s) Oral every 6 hours  linezolid    Tablet 600 milliGRAM(s) Oral every 12 hours  midodrine. 20 milliGRAM(s) Oral three times a day  nystatin    Suspension 366283 Unit(s) Oral four times a day  nystatin Cream 1 Application(s) Topical two times a day  ondansetron Injectable 4 milliGRAM(s) IV Push every 8 hours  pantoprazole  Injectable 40 milliGRAM(s) IV Push every 12 hours  polyethylene glycol 3350 17 Gram(s) Oral daily  rifAXIMin 550 milliGRAM(s) Oral two times a day  silver sulfADIAZINE 1% Cream 1 Application(s) Topical two times a day  tamsulosin 0.4 milliGRAM(s) Oral at bedtime  trimethoprim  160 mG/sulfamethoxazole 800 mG 1 Tablet(s) Oral daily  ursodiol Capsule 300 milliGRAM(s) Oral two times a day    PRN Inpatient Medications  dextrose 40% Gel 15 Gram(s) Oral once PRN  glucagon  Injectable 1 milliGRAM(s) IntraMuscular once PRN  traMADol 25 milliGRAM(s) Oral two times a day PRN      REVIEW OF SYSTEMS  --------------------------------------------------------------------------------  Gen: No fevers/chills  Head/Eyes/Ears/Mouth: No headache; Normal hearing; Normal vision    Respiratory: No dyspnea, cough  CV: No chest pain  GI: No abdominal pain, diarrhea, constipation, nausea, vomiting  : No increased frequency, dysuria  MSK: No joint pain/swelling; no edema    All other systems were reviewed and are negative, except as noted.    VITALS/PHYSICAL EXAM  --------------------------------------------------------------------------------  T(C): 36.3 (06-17-20 @ 08:39), Max: 36.8 (06-17-20 @ 00:19)  HR: 82 (06-17-20 @ 08:39) (82 - 85)  BP: 106/51 (06-17-20 @ 08:39) (106/51 - 118/56)  RR: 18 (06-17-20 @ 08:39) (18 - 18)  SpO2: 98% (06-17-20 @ 08:39) (98% - 100%)  Wt(kg): --        06-16-20 @ 07:01  -  06-17-20 @ 07:00  --------------------------------------------------------  IN: 2500 mL / OUT: 1256 mL / NET: 1244 mL    06-17-20 @ 07:01  -  06-17-20 @ 10:52  --------------------------------------------------------  IN: 100 mL / OUT: 0 mL / NET: 100 mL      Physical Exam:  	Gen: NAD, well-appearing on room air  	HEENT: Scleral icterus  	Pulm: CTA B/L, no crackles  	CV: RRR, S1S2; no rub/murmur  	GI: +BS, soft, nontender  	: No suprapubic tenderness  	MSK: Warm, no edema, mild asterixis              Neuro: Awake  	Psych: Normal affect and mood  	Skin: Warm, no cyanosis        LABS/STUDIES  --------------------------------------------------------------------------------              6.5    3.53  >-----------<  47       [06-17-20 @ 07:08]              19.4     130  |  100  |  41  ----------------------------<  144      [06-17-20 @ 07:08]  4.9   |  19  |  2.13        Ca     10.5     [06-17-20 @ 07:08]    TPro  6.1  /  Alb  4.1  /  TBili  9.5  /  DBili  x   /  AST  16  /  ALT  13  /  AlkPhos  114  [06-17-20 @ 07:08]    PT/INR: PT 30.2 , INR 2.55       [06-17-20 @ 09:35]  PTT: 54.1       [06-17-20 @ 09:35]      Creatinine Trend:  SCr 2.13 [06-17 @ 07:08]  SCr 1.72 [06-16 @ 05:25]  SCr 1.68 [06-15 @ 05:47]  SCr 1.74 [06-14 @ 07:17]  SCr 1.75 [06-13 @ 07:29]              Urinalysis - [06-11-20 @ 19:57]      Color Yellow / Appearance Clear / SG 1.013 / pH 5.0      Gluc Negative / Ketone Negative  / Bili Negative / Urobili 2 mg/dL       Blood Small / Protein Negative / Leuk Est Large / Nitrite Negative      RBC 4 / WBC 54 / Hyaline 0 / Gran  / Sq Epi  / Non Sq Epi 0 / Bacteria Many    Urine Creatinine 61      [06-11-20 @ 19:57]  Urine Protein 10      [06-11-20 @ 19:57]  Urine Sodium 92      [06-14-20 @ 10:52]  Urine Urea Nitrogen 450      [06-12-20 @ 02:46]    Iron 160, TIBC Unable to calculate Test Repeated, %sat Unable to calculate Test Repeated      [06-12-20 @ 09:04]  Ferritin 493      [04-29-20 @ 08:20]  PTH -- (Ca 10.3)      [12-13-19 @ 08:11]   12  Vitamin D (25OH) 25.3      [12-13-19 @ 08:11]  HbA1c 6.4      [02-12-20 @ 17:08]  TSH 3.26      [04-26-20 @ 09:47]  Lipid: chol 71, TG 63, HDL 30, LDL 28      [11-27-19 @ 09:11]    HBsAb Nonreact      [06-13-20 @ 10:35]  HBsAg Nonreact      [06-13-20 @ 10:35]  HBcAb Nonreact      [06-13-20 @ 10:35]

## 2020-06-17 NOTE — DIETITIAN INITIAL EVALUATION ADULT. - REASON INDICATOR FOR ASSESSMENT
Pt seen for LOS  Source: pt, PCA     Pt admitted c liver failure, hepatic encephalopathy; pt c JEAN cirrhosis, listed for transplant. Extensive past medical history noted.

## 2020-06-17 NOTE — DIETITIAN INITIAL EVALUATION ADULT. - ADD RECOMMEND
Reinforced importance of diet adherence. Encouraged continued good PO intake. Pt aware RD remains available as needed for further interventions as needed.

## 2020-06-18 LAB
ALBUMIN SERPL ELPH-MCNC: 4.2 G/DL — SIGNIFICANT CHANGE UP (ref 3.3–5)
ALP SERPL-CCNC: 117 U/L — SIGNIFICANT CHANGE UP (ref 40–120)
ALT FLD-CCNC: 11 U/L — SIGNIFICANT CHANGE UP (ref 10–45)
AMMONIA BLD-MCNC: 50 UMOL/L — SIGNIFICANT CHANGE UP (ref 11–55)
ANION GAP SERPL CALC-SCNC: 11 MMOL/L — SIGNIFICANT CHANGE UP (ref 5–17)
APTT BLD: 58.4 SEC — HIGH (ref 27.5–36.3)
AST SERPL-CCNC: 20 U/L — SIGNIFICANT CHANGE UP (ref 10–40)
BASOPHILS # BLD AUTO: 0.04 K/UL — SIGNIFICANT CHANGE UP (ref 0–0.2)
BASOPHILS NFR BLD AUTO: 1.2 % — SIGNIFICANT CHANGE UP (ref 0–2)
BILIRUB SERPL-MCNC: 8.7 MG/DL — HIGH (ref 0.2–1.2)
BUN SERPL-MCNC: 36 MG/DL — HIGH (ref 7–23)
CALCIUM SERPL-MCNC: 10.1 MG/DL — SIGNIFICANT CHANGE UP (ref 8.4–10.5)
CHLORIDE SERPL-SCNC: 100 MMOL/L — SIGNIFICANT CHANGE UP (ref 96–108)
CO2 SERPL-SCNC: 19 MMOL/L — LOW (ref 22–31)
CREAT SERPL-MCNC: 1.71 MG/DL — HIGH (ref 0.5–1.3)
EOSINOPHIL # BLD AUTO: 0.12 K/UL — SIGNIFICANT CHANGE UP (ref 0–0.5)
EOSINOPHIL NFR BLD AUTO: 3.6 % — SIGNIFICANT CHANGE UP (ref 0–6)
GLUCOSE SERPL-MCNC: 159 MG/DL — HIGH (ref 70–99)
HCT VFR BLD CALC: 22.8 % — LOW (ref 39–50)
HGB BLD-MCNC: 7.6 G/DL — LOW (ref 13–17)
IMM GRANULOCYTES NFR BLD AUTO: 0.3 % — SIGNIFICANT CHANGE UP (ref 0–1.5)
INR BLD: 2.61 RATIO — HIGH (ref 0.88–1.16)
LYMPHOCYTES # BLD AUTO: 1.05 K/UL — SIGNIFICANT CHANGE UP (ref 1–3.3)
LYMPHOCYTES # BLD AUTO: 31.3 % — SIGNIFICANT CHANGE UP (ref 13–44)
MCHC RBC-ENTMCNC: 33.3 GM/DL — SIGNIFICANT CHANGE UP (ref 32–36)
MCHC RBC-ENTMCNC: 34.9 PG — HIGH (ref 27–34)
MCV RBC AUTO: 104.6 FL — HIGH (ref 80–100)
MONOCYTES # BLD AUTO: 0.38 K/UL — SIGNIFICANT CHANGE UP (ref 0–0.9)
MONOCYTES NFR BLD AUTO: 11.3 % — SIGNIFICANT CHANGE UP (ref 2–14)
NEUTROPHILS # BLD AUTO: 1.75 K/UL — LOW (ref 1.8–7.4)
NEUTROPHILS NFR BLD AUTO: 52.3 % — SIGNIFICANT CHANGE UP (ref 43–77)
NRBC # BLD: 0 /100 WBCS — SIGNIFICANT CHANGE UP (ref 0–0)
PLATELET # BLD AUTO: 51 K/UL — LOW (ref 150–400)
POTASSIUM SERPL-MCNC: 5.3 MMOL/L — SIGNIFICANT CHANGE UP (ref 3.5–5.3)
POTASSIUM SERPL-SCNC: 5.3 MMOL/L — SIGNIFICANT CHANGE UP (ref 3.5–5.3)
PROT SERPL-MCNC: 5.8 G/DL — LOW (ref 6–8.3)
PROTHROM AB SERPL-ACNC: 30.4 SEC — HIGH (ref 10–13.1)
RBC # BLD: 2.18 M/UL — LOW (ref 4.2–5.8)
RBC # FLD: 21.4 % — HIGH (ref 10.3–14.5)
SODIUM SERPL-SCNC: 130 MMOL/L — LOW (ref 135–145)
WBC # BLD: 3.35 K/UL — LOW (ref 3.8–10.5)
WBC # FLD AUTO: 3.35 K/UL — LOW (ref 3.8–10.5)

## 2020-06-18 PROCEDURE — 99232 SBSQ HOSP IP/OBS MODERATE 35: CPT

## 2020-06-18 PROCEDURE — 99233 SBSQ HOSP IP/OBS HIGH 50: CPT | Mod: GC

## 2020-06-18 PROCEDURE — 99233 SBSQ HOSP IP/OBS HIGH 50: CPT

## 2020-06-18 PROCEDURE — 99231 SBSQ HOSP IP/OBS SF/LOW 25: CPT

## 2020-06-18 PROCEDURE — 73723 MRI JOINT LWR EXTR W/O&W/DYE: CPT | Mod: 26,RT

## 2020-06-18 RX ADMIN — LACTULOSE 30 GRAM(S): 10 SOLUTION ORAL at 23:26

## 2020-06-18 RX ADMIN — ONDANSETRON 4 MILLIGRAM(S): 8 TABLET, FILM COATED ORAL at 06:24

## 2020-06-18 RX ADMIN — NYSTATIN CREAM 1 APPLICATION(S): 100000 CREAM TOPICAL at 06:25

## 2020-06-18 RX ADMIN — Medication 1 MILLIGRAM(S): at 13:50

## 2020-06-18 RX ADMIN — LINEZOLID 600 MILLIGRAM(S): 600 INJECTION, SOLUTION INTRAVENOUS at 06:24

## 2020-06-18 RX ADMIN — ONDANSETRON 4 MILLIGRAM(S): 8 TABLET, FILM COATED ORAL at 21:25

## 2020-06-18 RX ADMIN — MIDODRINE HYDROCHLORIDE 20 MILLIGRAM(S): 2.5 TABLET ORAL at 13:50

## 2020-06-18 RX ADMIN — MIDODRINE HYDROCHLORIDE 20 MILLIGRAM(S): 2.5 TABLET ORAL at 17:36

## 2020-06-18 RX ADMIN — MIDODRINE HYDROCHLORIDE 20 MILLIGRAM(S): 2.5 TABLET ORAL at 06:23

## 2020-06-18 RX ADMIN — Medication 3 UNIT(S): at 13:48

## 2020-06-18 RX ADMIN — Medication 500000 UNIT(S): at 23:25

## 2020-06-18 RX ADMIN — URSODIOL 300 MILLIGRAM(S): 250 TABLET, FILM COATED ORAL at 06:23

## 2020-06-18 RX ADMIN — LACTULOSE 30 GRAM(S): 10 SOLUTION ORAL at 06:25

## 2020-06-18 RX ADMIN — Medication 3 UNIT(S): at 09:21

## 2020-06-18 RX ADMIN — Medication 50 MILLILITER(S): at 06:22

## 2020-06-18 RX ADMIN — POLYETHYLENE GLYCOL 3350 17 GRAM(S): 17 POWDER, FOR SOLUTION ORAL at 13:50

## 2020-06-18 RX ADMIN — Medication 500000 UNIT(S): at 06:24

## 2020-06-18 RX ADMIN — LINEZOLID 600 MILLIGRAM(S): 600 INJECTION, SOLUTION INTRAVENOUS at 17:37

## 2020-06-18 RX ADMIN — Medication 3 UNIT(S): at 17:35

## 2020-06-18 RX ADMIN — TAMSULOSIN HYDROCHLORIDE 0.4 MILLIGRAM(S): 0.4 CAPSULE ORAL at 23:25

## 2020-06-18 RX ADMIN — Medication 2: at 17:35

## 2020-06-18 RX ADMIN — LACTULOSE 30 GRAM(S): 10 SOLUTION ORAL at 13:49

## 2020-06-18 RX ADMIN — ONDANSETRON 4 MILLIGRAM(S): 8 TABLET, FILM COATED ORAL at 13:51

## 2020-06-18 RX ADMIN — URSODIOL 300 MILLIGRAM(S): 250 TABLET, FILM COATED ORAL at 17:37

## 2020-06-18 RX ADMIN — Medication 500000 UNIT(S): at 00:04

## 2020-06-18 RX ADMIN — NYSTATIN CREAM 1 APPLICATION(S): 100000 CREAM TOPICAL at 17:40

## 2020-06-18 RX ADMIN — PANTOPRAZOLE SODIUM 40 MILLIGRAM(S): 20 TABLET, DELAYED RELEASE ORAL at 17:38

## 2020-06-18 RX ADMIN — Medication 1 TABLET(S): at 13:51

## 2020-06-18 RX ADMIN — LACTULOSE 30 GRAM(S): 10 SOLUTION ORAL at 00:04

## 2020-06-18 RX ADMIN — Medication 500000 UNIT(S): at 17:37

## 2020-06-18 RX ADMIN — Medication 2: at 13:47

## 2020-06-18 RX ADMIN — LACTULOSE 30 GRAM(S): 10 SOLUTION ORAL at 17:37

## 2020-06-18 RX ADMIN — Medication 500000 UNIT(S): at 13:50

## 2020-06-18 RX ADMIN — INSULIN GLARGINE 4 UNIT(S): 100 INJECTION, SOLUTION SUBCUTANEOUS at 21:24

## 2020-06-18 RX ADMIN — Medication 1: at 08:15

## 2020-06-18 RX ADMIN — PANTOPRAZOLE SODIUM 40 MILLIGRAM(S): 20 TABLET, DELAYED RELEASE ORAL at 06:24

## 2020-06-18 NOTE — PROGRESS NOTE ADULT - PROBLEM SELECTOR PLAN 2
No symptoms, possible chronic colonization. Has been on Meropenem  - care discussed with Dr. Pérez: recommend course of linezolid for VRE UTI for total 7 days (6/15-6/21)  - UCx with VRE  - CTAP on this admission without prostate abscess

## 2020-06-18 NOTE — PROGRESS NOTE ADULT - SUBJECTIVE AND OBJECTIVE BOX
Patient is a 64y old  Male who presents with a chief complaint of Liver failure, hepatic encephalopathy (18 Jun 2020 12:04)      SUBJECTIVE / OVERNIGHT EVENTS:  no acute events overnight, vss, afebrile  pt still with good bowel movement and mental status fluctuating but overall improved    ROS:  14 point ROS negative in detail except stated as above    MEDICATIONS  (STANDING):  dextrose 5%. 1000 milliLiter(s) (50 mL/Hr) IV Continuous <Continuous>  dextrose 50% Injectable 12.5 Gram(s) IV Push once  dextrose 50% Injectable 25 Gram(s) IV Push once  dextrose 50% Injectable 25 Gram(s) IV Push once  folic acid 1 milliGRAM(s) Oral daily  hepatitis B Vaccine - Adult (ENGERIX-B) 20 MICROGram(s) IntraMuscular once  insulin glargine Injectable (LANTUS) 4 Unit(s) SubCutaneous at bedtime  insulin lispro (HumaLOG) corrective regimen sliding scale   SubCutaneous three times a day before meals  insulin lispro (HumaLOG) corrective regimen sliding scale   SubCutaneous at bedtime  insulin lispro Injectable (HumaLOG) 3 Unit(s) SubCutaneous three times a day before meals  lactulose Syrup 30 Gram(s) Oral every 6 hours  linezolid    Tablet 600 milliGRAM(s) Oral every 12 hours  midodrine. 20 milliGRAM(s) Oral three times a day  nystatin    Suspension 143279 Unit(s) Oral four times a day  nystatin Cream 1 Application(s) Topical two times a day  ondansetron Injectable 4 milliGRAM(s) IV Push every 8 hours  pantoprazole  Injectable 40 milliGRAM(s) IV Push every 12 hours  polyethylene glycol 3350 17 Gram(s) Oral daily  rifAXIMin 550 milliGRAM(s) Oral two times a day  silver sulfADIAZINE 1% Cream 1 Application(s) Topical two times a day  tamsulosin 0.4 milliGRAM(s) Oral at bedtime  trimethoprim  160 mG/sulfamethoxazole 800 mG 1 Tablet(s) Oral daily  ursodiol Capsule 300 milliGRAM(s) Oral two times a day    MEDICATIONS  (PRN):  acetaminophen   Tablet .. 650 milliGRAM(s) Oral every 8 hours PRN Moderate Pain (4 - 6)  dextrose 40% Gel 15 Gram(s) Oral once PRN Blood Glucose LESS THAN 70 milliGRAM(s)/deciliter  glucagon  Injectable 1 milliGRAM(s) IntraMuscular once PRN Glucose LESS THAN 70 milligrams/deciliter      CAPILLARY BLOOD GLUCOSE      POCT Blood Glucose.: 161 mg/dL (18 Jun 2020 07:59)  POCT Blood Glucose.: 257 mg/dL (17 Jun 2020 21:26)  POCT Blood Glucose.: 256 mg/dL (17 Jun 2020 18:04)  POCT Blood Glucose.: 179 mg/dL (17 Jun 2020 13:02)    I&O's Summary    17 Jun 2020 07:01  -  18 Jun 2020 07:00  --------------------------------------------------------  IN: 750 mL / OUT: 1600 mL / NET: -850 mL        PHYSICAL EXAM:  Vital Signs Last 24 Hrs  T(C): 36.4 (18 Jun 2020 08:16), Max: 36.8 (17 Jun 2020 14:55)  T(F): 97.5 (18 Jun 2020 08:16), Max: 98.2 (17 Jun 2020 14:55)  HR: 80 (18 Jun 2020 08:16) (75 - 82)  BP: 122/60 (18 Jun 2020 08:16) (97/59 - 122/60)  BP(mean): --  RR: 18 (18 Jun 2020 08:16) (18 - 18)  SpO2: 98% (18 Jun 2020 08:16) (97% - 100%)    GENERAL: NAD, well-developed  HEAD:  Atraumatic, Normocephalic  EYES: EOMI, PERRLA, conjunctiva and sclera clear  NECK: Supple, No JVD  CHEST/LUNG: Clear to auscultation bilaterally; No wheeze  HEART: Regular rate and rhythm; No murmurs, rubs, or gallops  ABDOMEN: Soft, Nontender, Nondistended; Bowel sounds present  EXTREMITIES:  2+ Peripheral Pulses, No clubbing, cyanosis, or edema  NEUROLOGY: AAOx2; non-focal  SKIN: R heel ulcer without drainage; No rashes or lesions    LABS:  personally reviewed                        7.6    3.35  )-----------( 51       ( 18 Jun 2020 07:09 )             22.8     06-18    130<L>  |  100  |  36<H>  ----------------------------<  159<H>  5.3   |  19<L>  |  1.71<H>    Ca    10.1      18 Jun 2020 07:09    TPro  5.8<L>  /  Alb  4.2  /  TBili  8.7<H>  /  DBili  x   /  AST  20  /  ALT  11  /  AlkPhos  117  06-18    PT/INR - ( 18 Jun 2020 08:34 )   PT: 30.4 sec;   INR: 2.61 ratio         PTT - ( 18 Jun 2020 08:34 )  PTT:58.4 sec          RADIOLOGY & ADDITIONAL TESTS:    Imaging Personally Reviewed:    Consultant(s) Notes Reviewed:  hepatology, ID    Care Discussed with Consultants/Other Providers: Dr. Dong (hepatology)

## 2020-06-18 NOTE — PROGRESS NOTE ADULT - SUBJECTIVE AND OBJECTIVE BOX
Podiatry pager #: [phone] ([place])/ [phone] ([facility])    Patient is a 64y old  Male who presents with a chief complaint of Liver failure, hepatic encephalopathy (18 Jun 2020 14:43)       INTERVAL HPI/OVERNIGHT EVENTS:  Patient seen and evaluated at bedside.  Pt is resting comfortable in NAD. Denies N/V/F/C.     Allergies    codeine (Anaphylaxis)    Intolerances        Vital Signs Last 24 Hrs  T(C): 36.4 (18 Jun 2020 08:16), Max: 36.7 (17 Jun 2020 18:15)  T(F): 97.5 (18 Jun 2020 08:16), Max: 98.1 (17 Jun 2020 18:15)  HR: 80 (18 Jun 2020 08:16) (77 - 80)  BP: 122/60 (18 Jun 2020 08:16) (110/61 - 122/60)  BP(mean): --  RR: 18 (18 Jun 2020 08:16) (18 - 18)  SpO2: 98% (18 Jun 2020 08:16) (97% - 98%)    LABS:                        7.6    3.35  )-----------( 51       ( 18 Jun 2020 07:09 )             22.8     06-18    130<L>  |  100  |  36<H>  ----------------------------<  159<H>  5.3   |  19<L>  |  1.71<H>    Ca    10.1      18 Jun 2020 07:09    TPro  5.8<L>  /  Alb  4.2  /  TBili  8.7<H>  /  DBili  x   /  AST  20  /  ALT  11  /  AlkPhos  117  06-18    PT/INR - ( 18 Jun 2020 08:34 )   PT: 30.4 sec;   INR: 2.61 ratio         PTT - ( 18 Jun 2020 08:34 )  PTT:58.4 sec    CAPILLARY BLOOD GLUCOSE      POCT Blood Glucose.: 207 mg/dL (18 Jun 2020 12:53)  POCT Blood Glucose.: 161 mg/dL (18 Jun 2020 07:59)  POCT Blood Glucose.: 257 mg/dL (17 Jun 2020 21:26)  POCT Blood Glucose.: 256 mg/dL (17 Jun 2020 18:04)      Lower Extremity Physical Exam:  Vascular: DP 2/4 PT 1/4 B/L, CFT <3 seconds B/L, Temperature gradient warm to cool B/L  Neuro: Epicritic sensation diminished to the level of heel B/L  Musculoskeletal/Ortho: no gross deformities  Skin:   Wound #1: right foot  Location: posterior heel  Size: 6 x 4 cm  Depth: subQ  Wound bed: eschar   Drainage: none  Odor: none  Periwound: hyperkeratotic  Etiology: pressure, diabetes    RADIOLOGY & ADDITIONAL TESTS:

## 2020-06-18 NOTE — PROGRESS NOTE ADULT - ATTENDING COMMENTS
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 He is re-admitted with hepatic encephalopathy, recurrent UTI, and MISA on CKD.    # Complicated UTI with VRE: Receiving 7-day course of linezolid (6/15-21) as per Transplant ID. Also remains on Bactrim prophylaxis (given prior ESBL E. coli infection). Blood cultures remain negative. Afebrile and without leukocytosis.    # Right heel ulcer: Re-evaluated by Podiatry and Transplant ID today, who do not feel there is any active infection. No deep infection seen on prior non-contrast MRI, but will discuss with Transplant ID and Transplant Surgery whether repeat imaging with MRI with contrast is warranted.    # Hepatic encephalopathy: Improved since admission, but not yet at his baseline. Continue rifaximin, Miralax, and lactulose and will titrate as needed.    # MISA on CKD: Transplant Nephrology input appreciated. Holding albumin and diuretics for now.    # Orthostatic hypotension: Chronic, related to end-stage liver disease. Continue home midodrine.    # Hyponatremia: Continue fluid restriction.    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 S/p 1 unit PRBCs yesterday for Hb 6.5 with appropriate response (-> Hb 7.9 -> 7.6). Continue to monitor.    # Decompensated JEAN cirrhosis: He is currently UNOS wait-listed for liver transplantation at [facility], blood type A, with MELD-Na 35 (6/17/20), but not currently accepting liver offers for him until acute infectious concerns resolved. Please continue to check MELD labs daily.    Please don't hesitate to call with any questions/concerns.    Letitia Mo M.D., Ph.D.  Transplant Hepatology  Cell: [phone]

## 2020-06-18 NOTE — PROGRESS NOTE ADULT - SUBJECTIVE AND OBJECTIVE BOX
INTERVAL HPI/OVERNIGHT EVENTS:  No new overnight event.  No N/V/D.  Tolerating diet.  6 bm yesterday  none today    Allergies    codeine (Anaphylaxis)    Intolerances    General:  No wt loss, fevers, chills, night sweats, fatigue,   Eyes:  Good vision, no reported pain  ENT:  No sore throat, pain, runny nose, dysphagia  CV:  No pain, palpitations, hypo/hypertension  Resp:  No dyspnea, cough, tachypnea, wheezing  GI:  No pain, No nausea, No vomiting, No diarrhea, No constipation, No weight loss, No fever, No pruritis, No rectal bleeding, No tarry stools, No dysphagia,  :  No pain, bleeding, incontinence, nocturia  Muscle:  No pain, weakness  Neuro:  No weakness, tingling, memory problems  Psych:  No fatigue, insomnia, mood problems, depression  Endocrine:  No polyuria, polydipsia, cold/heat intolerance  Heme:  No petechiae, ecchymosis, easy bruisability  Skin:  No rash, tattoos, scars, edema    PHYSICAL EXAM:   Vital Signs:  Vital Signs Last 24 Hrs  T(C): 36.7 (14 Jun 2020 07:53), Max: 36.7 (14 Jun 2020 07:53)  T(F): 98.1 (14 Jun 2020 07:53), Max: 98.1 (14 Jun 2020 07:53)  HR: 79 (14 Jun 2020 07:53) (75 - 80)  BP: 127/77 (14 Jun 2020 07:53) (108/53 - 133/68)  BP(mean): --  RR: 18 (14 Jun 2020 07:53) (17 - 18)  SpO2: 97% (14 Jun 2020 07:53) (97% - 100%)  Daily     Daily I&O's Summary    13 Jun 2020 07:01  -  14 Jun 2020 07:00  --------------------------------------------------------  IN: 350 mL / OUT: 250 mL / NET: 100 mL    14 Jun 2020 07:01  -  14 Jun 2020 12:30  --------------------------------------------------------  IN: 0 mL / OUT: 350 mL / NET: -350 mL        GENERAL:  Appears stated age, well-groomed, well-nourished, no distress  HEENT:  NC/AT,  conjunctivae clear and pink, no thyromegaly, nodules, adenopathy, no JVD, sclera -anicteric  CHEST:  Full & symmetric excursion, no increased effort, breath sounds clear  HEART:  Regular rhythm, S1, S2, no murmur/rub/S3/S4, no abdominal bruit, no edema  ABDOMEN:  Soft, non-tender, non-distended, normoactive bowel sounds,  no masses ,no hepato-splenomegaly, no signs of chronic liver disease  EXTEREMITIES:  no cyanosis,clubbing or edema  SKIN:  No rash/erythema/ecchymoses/petechiae/wounds/abscess/warm/dry  NEURO:  Alert, oriented, no asterixis, no tremor, no encephalopathy      LABS:                        9.0    5.07  )-----------( 68       ( 14 Jun 2020 07:17 )             26.4     06-14    131<L>  |  100  |  27<H>  ----------------------------<  141<H>  5.1   |  20<L>  |  1.74<H>    Ca    10.5      14 Jun 2020 07:17    TPro  6.3  /  Alb  3.8  /  TBili  11.0<H>  /  DBili  x   /  AST  24  /  ALT  15  /  AlkPhos  133<H>  06-14    PT/INR - ( 14 Jun 2020 09:19 )   PT: 26.3 sec;   INR: 2.23 ratio         PTT - ( 14 Jun 2020 09:19 )  PTT:44.2 sec    amylase   lipase  RADIOLOGY & ADDITIONAL TESTS:

## 2020-06-18 NOTE — PROGRESS NOTE ADULT - ASSESSMENT
64 M hx of DM, GERD, HFpEF with mild LV diastolic dysfunction, and decompensated JEAN cirrhosis c/b ascites with hx of SBP, HE, duodenal ulcer, GAVE and duodenal AVM s/p APC, recent admission for ESBL E coli prostatic abscess 2/2 4 wks ertapenem, hx of ESBL UTIs, recent VRE urinary colonization, admitted for acute hepatic encephalopathy likely in setting of VRE UTI now on linezolid

## 2020-06-18 NOTE — PROGRESS NOTE ADULT - ASSESSMENT
Pt with MISA likely 2/2 pre renal and/or ATN in setting hypotension, less likely HRS (Concepcion>35, no response tx)    #Acute Kidney Injury  - MISA likely 2/2 pre renal ATN in setting hypotension, infection, decrease PO intake, less likely HRS (Concepcion>35, no response tx)  - Urine Na 112 ~ ATN, UPro/Cr 0.2 gram, UA showed UTI w/o protein/rbc.    - Baseline sCr 1.0 in 5/2020, on admission sCr 1.57 (6/11), uptrend to 1.75 on 6/13. Scr stable today 1.71mg/dL  - Borderline hypotension during hospitalization (on midodrine), no response to albumin/midodrine treated, less likely HRS given Concepcion>35. Good UOP during hospitalization, lasix/aldactone held.    - Prior MISA w/u Dec 2019 showed FLC elevated K/L ratio 2.91 (K>L) and immunofixation showing "one Weak IgA Lambda  Band and One Weak IgG Lambda Band Identified"  - C/w holding diuretics for now, hold further albumin for now  - No absolute indication for dialysis, no fluid overload no refractory electrolyte derangement   - Avoid nephrotoxin meds such as NSAIDS, PPI, ACEI/ARB, and contrast agents.  - Renally dose medications per GFR.      Abelino Patterson   Nephrology Fellow  Pager [place]: [phone]/ [facility]: [phone]  (After 5 pm or on weekends please page the on-call fellow)

## 2020-06-18 NOTE — PROGRESS NOTE ADULT - PROBLEM SELECTOR PLAN 1
Multifactorial: Chronic slow bleed 2/2 known AVM/duodenal GAVE and mild iron deficiency/spur cell anemia in setting on decompensated cirrhosis;   - hgb stable s/p 1 unit pRBC transfusion  - monitor cbc q12hr  - no signs of active bleed at this time: low suspicion for active GIB

## 2020-06-18 NOTE — PROGRESS NOTE ADULT - ASSESSMENT
encepaholapthy  anemia  advance cirrhosis    plan  hepatology input appreciated  cont antibiotics  diet as tolerated  monitor bm on lactulose  will follow

## 2020-06-18 NOTE — CONSULT NOTE ADULT - SUBJECTIVE AND OBJECTIVE BOX
Transplant Surgery Consult Note   --------------------------------------------------------------    HPI:  63 y/o gentleman with IDDM, dyslipidemia, obesity, GERD, HFpEF with mild LV diastolic dysfunction, MGUS, and decompensated JEAN cirrhosis complicated by ascites, history of SBP, hepatic encephalopathy, and chronic anemia with a history of duodenal ulcer as well as GAVE and duodenal AVM s/p APC (last on 10/11/19), UNOS listed for liver transplantation at [facility]. 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 He is now re-admitted with hepatic encephalopathy and VRE UTI, also with MISA on CKD.  MELD 32    Blood type A negative      MEDICATIONS  (STANDING):  dextrose 5%. 1000 milliLiter(s) (50 mL/Hr) IV Continuous <Continuous>  dextrose 50% Injectable 12.5 Gram(s) IV Push once  dextrose 50% Injectable 25 Gram(s) IV Push once  dextrose 50% Injectable 25 Gram(s) IV Push once  folic acid 1 milliGRAM(s) Oral daily  hepatitis B Vaccine - Adult (ENGERIX-B) 20 MICROGram(s) IntraMuscular once  insulin glargine Injectable (LANTUS) 4 Unit(s) SubCutaneous at bedtime  insulin lispro (HumaLOG) corrective regimen sliding scale   SubCutaneous three times a day before meals  insulin lispro (HumaLOG) corrective regimen sliding scale   SubCutaneous at bedtime  insulin lispro Injectable (HumaLOG) 3 Unit(s) SubCutaneous three times a day before meals  lactulose Syrup 30 Gram(s) Oral every 6 hours  linezolid    Tablet 600 milliGRAM(s) Oral every 12 hours  midodrine. 20 milliGRAM(s) Oral three times a day  nystatin    Suspension 363010 Unit(s) Oral four times a day  nystatin Cream 1 Application(s) Topical two times a day  ondansetron Injectable 4 milliGRAM(s) IV Push every 8 hours  pantoprazole  Injectable 40 milliGRAM(s) IV Push every 12 hours  polyethylene glycol 3350 17 Gram(s) Oral daily  rifAXIMin 550 milliGRAM(s) Oral two times a day  silver sulfADIAZINE 1% Cream 1 Application(s) Topical two times a day  tamsulosin 0.4 milliGRAM(s) Oral at bedtime  trimethoprim  160 mG/sulfamethoxazole 800 mG 1 Tablet(s) Oral daily  ursodiol Capsule 300 milliGRAM(s) Oral two times a day    MEDICATIONS  (PRN):  acetaminophen   Tablet .. 650 milliGRAM(s) Oral every 8 hours PRN Moderate Pain (4 - 6)  dextrose 40% Gel 15 Gram(s) Oral once PRN Blood Glucose LESS THAN 70 milliGRAM(s)/deciliter  glucagon  Injectable 1 milliGRAM(s) IntraMuscular once PRN Glucose LESS THAN 70 milligrams/deciliter      PAST MEDICAL & SURGICAL HISTORY:  GIB (gastrointestinal bleeding)  GERD with esophagitis: Gastritis &amp; Non Bleeding Ulcers  Hepatic encephalopathy  Obesity  Fatty liver disease, nonalcoholic  Renal stones: 25 years ago  Hypertension  Neuropathy  Hypercholesteremia  Diabetes  S/P cholecystectomy      Vital Signs Last 24 Hrs  T(C): 36.4 (18 Jun 2020 08:16), Max: 36.8 (17 Jun 2020 14:55)  T(F): 97.5 (18 Jun 2020 08:16), Max: 98.2 (17 Jun 2020 14:55)  HR: 80 (18 Jun 2020 08:16) (75 - 82)  BP: 122/60 (18 Jun 2020 08:16) (97/59 - 122/60)  BP(mean): --  RR: 18 (18 Jun 2020 08:16) (18 - 18)  SpO2: 98% (18 Jun 2020 08:16) (97% - 100%)    I&O's Summary    17 Jun 2020 07:01  -  18 Jun 2020 07:00  --------------------------------------------------------  IN: 750 mL / OUT: 1600 mL / NET: -850 mL                              7.6    3.35  )-----------( 51       ( 18 Jun 2020 07:09 )             22.8     06-18    130<L>  |  100  |  36<H>  ----------------------------<  159<H>  5.3   |  19<L>  |  1.71<H>    Ca    10.1      18 Jun 2020 07:09    TPro  5.8<L>  /  Alb  4.2  /  TBili  8.7<H>  /  DBili  x   /  AST  20  /  ALT  11  /  AlkPhos  117  06-18        Culture - Urine (collected 06-14-20 @ 14:07)  Source: .Urine Catheterized  Final Report (06-16-20 @ 10:44):    >100,000 CFU/ml Enterococcus faecium (vancomycin resistant)  Organism: Enterococcus faecium (vancomycin resistant) (06-16-20 @ 10:44)  Organism: Enterococcus faecium (vancomycin resistant) (06-16-20 @ 10:44)    Culture - Urine (collected 06-14-20 @ 01:07)  Source: .Urine Clean Catch (Midstream)  Final Report (06-15-20 @ 21:22):    >100,000 CFU/ml Enterococcus faecium (vancomycin resistant)  Organism: Enterococcus faecium (vancomycin resistant) (06-15-20 @ 21:22)  Organism: Enterococcus faecium (vancomycin resistant) (06-15-20 @ 21:22)    Culture - Blood (collected 06-12-20 @ 17:34)  Source: .Blood Blood-Peripheral  Final Report (06-17-20 @ 18:00):    No Growth Final    Culture - Blood (collected 06-12-20 @ 17:34)  Source: .Blood Blood-Peripheral  Final Report (06-17-20 @ 18:00):    No Growth Final        Review of systems  Gen: No weight changes, fatigue, fevers/chills, weakness  Skin: Jaundice  Head/Eyes/Ears/Mouth: No headache; Normal hearing; Normal vision w/o blurriness; No sinus pain/discomfort.    Respiratory: No dyspnea, cough, wheezing, hemoptysis  CV: No chest pain, PND, orthopnea  GI: denies diarrhea, constipation, nausea, vomiting, melena, hematochezia  : No increased frequency, dysuria, hematuria, nocturia  MSK: No joint pain/swelling; no back pain; no edema  Neuro: No dizziness/lightheadedness, weakness, seizures, numbness, tingling  Heme: No easy bruising or bleeding  Endo: No heat/cold intolerance  Psych: No significant nervousness, anxiety, stress, depression  All other systems were reviewed and are negative, except as noted.        PHYSICAL EXAM:  Constitutional: Well developed / well nourished  Eyes: Anicteric, PERRLA  ENMT: nc/at  Neck: supple  Respiratory: CTA B/L  Cardiovascular: RRR  Abdominal:  soft, NT, ND  Genitourinary: Voiding spontaneously  Extremities: Left heel wound with purulent drainage. Mild asterixis  Vascular: Palpable dp pulses bilaterally  Neurological: A&O x2  Skin: no rashes, ulcerations or lesions   Musculoskeletal: Moving all extremities  Psychiatric: Responsive

## 2020-06-18 NOTE — PROGRESS NOTE ADULT - ASSESSMENT
Patient is a 36y old  Male who presents with a chief complaint of      INTERVAL HPI/OVERNIGHT EVENTS:  Patient seen and evaluated at bedside.  Pt is resting comfortable in NAD. Denies N/V/F/C.      Allergies    No Known Allergies    Intolerances        Vital Signs Last 24 Hrs  T(C): 36.8 (22 Sep 2019 06:44), Max: 37.3 (21 Sep 2019 21:30)  T(F): 98.2 (22 Sep 2019 06:44), Max: 99.2 (22 Sep 2019 02:44)  HR: 96 (22 Sep 2019 06:44) (84 - 111)  BP: 126/68 (22 Sep 2019 06:44) (110/63 - 146/93)  BP(mean): --  RR: 15 (22 Sep 2019 06:44) (15 - 18)  SpO2: 99% (22 Sep 2019 06:44) (99% - 100%)    LABS:                        13.9   10.00 )-----------( 277      ( 22 Sep 2019 08:15 )             43.1     09-22    137  |  101  |  11  ----------------------------<  186<H>  3.9   |  24  |  0.63    Ca    8.9      22 Sep 2019 08:15  Phos  2.1     09-21  Mg     1.9     09-21    TPro  7.1  /  Alb  3.6  /  TBili  0.5  /  DBili  x   /  AST  23  /  ALT  29  /  AlkPhos  81  09-22    PT/INR - ( 21 Sep 2019 16:45 )   PT: 11.4 SEC;   INR: 1.03          PTT - ( 21 Sep 2019 16:45 )  PTT:31.0 SEC    CAPILLARY BLOOD GLUCOSE      POCT Blood Glucose.: 173 mg/dL (22 Sep 2019 08:49)  POCT Blood Glucose.: 279 mg/dL (21 Sep 2019 15:30)      Lower Extremity Physical Exam:  Vascular: DP/PT 2/4 B/L, CFT <3 seconds B/L, Temperature gradient warm to cool  B/L  Neuro: Epicritic sensation diminished to the level of toes B/L  Skin: Left foot wound located under left fifth digit measuring 2 mm x 2 mm and probes down to subQ, wound bed is fibrous w/ no discharge/purulence/malodor, does not track, does not undermine, erythematous fifth digit, mild edema noted on left foot     RADIOLOGY & ADDITIONAL TESTS: 65 y/o M PMHx decompensated JEAN Cirrhosis on transplant list, DMII, HFpEF, recent admission for ESBL E coli prostatic abscess 2/2 4 wks ertapenem, hx of ESBL UTIs, recent VRE urinary colonization came to hospital for worsening jaundice and episode of confusion, resolved PTA.     CT Pelvis with no evidence of abscess (but noncontrast)  BCx 6/12 with NGTD  UCx 6/14 with gram positive organisms (suspect VRE - had this previously)    Given worsening confusion out of proportion to patient's previous presentations for HE favor covering for VRE  Linezolid CAN interact with Midodrine and Tramadol (potential for serotonin syndrome but low likelihood - would monitor)  Mental status improved after initiation of linezolid  Favor completing 7 day empiric coverage for VRE    Noted to have purulent drainage from the right heel eschar by transplant surgery  No obvious drainage at time of my assessment however, reasonable to have podiatry reevaluate patient  Would check MRI of the heel with contrast to help exclude underlying OM    RECOMMENDATIONS    #R Heel Eschar  --Recommend MRI with IV contrast of the RLE foot  --Recommend re-evaluation of the foot by podiatry  --If fever or worsening drainage from the right heel would add back meropene     #Encephalopathy, Positive UCx, VRE Colonization  --Continue Linezolid 600 mg PO Q12H (End Date: 6/21/20)  --Continue to follow CBC with diff  --Continue to follow temperature curve  --Follow up on preliminary blood cultures    #MISA, Prophylactic Antibiotic  --Appreciate Nephro and transplant hepatology recommendations  --Continue Bactrim DS PO QD for now for UTI PPx (previous recurrent ESBL E coli infection) - doubt Bactrim etiology of MISA but would continue to follow renal function (Cr/BUN)    #Pre-OLT Evaluation, Encounter for Vaccination,  --No absolute ID contraindication for OLT. Patient's mental status has improved and blood cultures are with no growth to date.   --Patient will require Daptomycin and Meropenem for danilo-operative prophylaxis  --PCV 13 and 23 vaccination completed February 2020  --Will require HBV Vaccination - would give dose prior to discharge    I will continue to follow. Please feel free to contact me with any further questions.    Eusebio Pérez M.D.  [facility] Division of Infectious Disease  8AM-5PM: Pager Number [phone]  After Hours (or if no response): Please contact the Infectious Diseases Office at [phone]     The above assessment and plan were discussed with medicine NP, Dr Letitia Mo, Dr Chris

## 2020-06-18 NOTE — PROGRESS NOTE ADULT - ASSESSMENT
64M w/ HFpEF, decompensated JEAN cirrhosis w/ hx of ascites + SBP, GI bleed 2/2 duodenal ulcers, angioectasias, and GAVE, ESBL E. Coli prostatic abscess s/p 4 weeks IV ertapenem, hx of ESBL UTIs and VRE colonization, presenting w/ jaundice and confusion, likely 2/2 HE and infection.    Impression:  #Cirrhosis 2/2 JEAN, decompensated by ascites; listed for liver transplant  -varices: small on last EGD 12/2019, not on BB  -ascites: trace on U/S this admission  -HCC: neg on U/S this admission  -HE: present on exam, on lactulose + rifaximin at home, improving w/ lactulose and tx of infection  -MELD-Na 33 on 6/18  #Anemia w/o overt GI bleeding, Hb down to 6.5, likely due to low level hemolysis from end stage cirrhosis  #R posterior heel wound w/ overlying eschar – no signs of infections, XR neg for gas, evaluated by podiatry  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 per ID    Recommendation:  - continue to trend Hb daily, transfuse to Hb > 7, no concern for active GI bleeding at this time  - c/w lactulose titrated to 3-4 BMs per day  - c/w miralax  - c/w rifaximin  - f/u transplant ID recs re: antibiotics, agree w/ 7d of linezolid  - trend CMP, CBC, INR daily  - f/u renal recs re: albumin, continue to trend Cr  - continue home midodrine therapy  - low Na diet  - rest of care per primary team  - hepatology will follow    Ze Dominguez  Gastroenterology Fellow  Available via [employer]  GI Phone# [phone] ([facility])  Page on-call GI fellow via  from 5pm-8am and on weekends

## 2020-06-18 NOTE — PROGRESS NOTE ADULT - ASSESSMENT
64M w/ R posterior heel wound, stable  -Pt seen and evaluated  -R posterior heel wound to subQ w/ overlying eschar, stable w/ no signs of infection  -XR negative for gas or OM  -No podiatric surgical intervention  -Recommend local wound care w/ betadine and DSD  -Offload feet at all times while in bed (Z flow boots or pillows underneath legs)  -[facility] up w/ [facility]. Wang [facility] ([address], [address]) within 1 week of discharge. Call [phone] for appointment.  -Discussed w/ attending

## 2020-06-18 NOTE — PROGRESS NOTE ADULT - PROBLEM SELECTOR PLAN 5
SCr now trending down to 1.7  - net negative 850cc  - appreciate nephrology recs: no indication for RRT at this time  - continue to hold diuretics  - bob in place  - continue to monitor BMP

## 2020-06-18 NOTE — PROGRESS NOTE ADULT - SUBJECTIVE AND OBJECTIVE BOX
[facility] DIVISION OF KIDNEY DISEASES AND HYPERTENSION -- FOLLOW UP NOTE  --------------------------------------------------------------------------------  Abelino Patterson   Nephrology Fellow  Pager NS: [phone]/ [facility]: [phone]  (After 5 pm or on weekends please page the on-call fellow)    64M PMHx HFpEF, decompensated cirrhosis 2/2 JEAN (enlisted for liver transplant) c/b ascites, hx SBP, duodenal AVM and DM2 who admitted to [facility] for acute encephalopathy. Nephrology consulted for MISA.  Upon review of [facility], baseline sCr 1.0 in 5/2020). On admission serum creatinine 1.57 on 6/11 which worsened to 1.75 on 6/13.  Urinalysis showed UTI no protein/rbc.  Home medication Lasix and aldactone (held during this hospitalization). On admission, pt was hypotensive and started on midodrine and albumin however serum Cr without improvement.  Of note, prior MISA workup in Dec 2019 showed elevated K/L ratio 2.91 (K>L) and immunofixation showing "one Weak IgA Lambda  Band and One Weak IgG Lambda Band Identified" otherwise normal complement, neg SRAVAN/dsDNA/ANCA.      24 hour events:   Multiple BM's  Mental status improving   UO: 1.3L over the past 24 hours  BUN/Scr:36/1.7, K 5.3, Na 130        PAST HISTORY  --------------------------------------------------------------------------------  No significant changes to PMH, PSH, FHx, SHx, unless otherwise noted    ALLERGIES & MEDICATIONS  --------------------------------------------------------------------------------  Allergies    codeine (Anaphylaxis)    Intolerances      Standing Inpatient Medications  dextrose 5%. 1000 milliLiter(s) IV Continuous <Continuous>  dextrose 50% Injectable 12.5 Gram(s) IV Push once  dextrose 50% Injectable 25 Gram(s) IV Push once  dextrose 50% Injectable 25 Gram(s) IV Push once  folic acid 1 milliGRAM(s) Oral daily  hepatitis B Vaccine - Adult (ENGERIX-B) 20 MICROGram(s) IntraMuscular once  insulin glargine Injectable (LANTUS) 4 Unit(s) SubCutaneous at bedtime  insulin lispro (HumaLOG) corrective regimen sliding scale   SubCutaneous three times a day before meals  insulin lispro (HumaLOG) corrective regimen sliding scale   SubCutaneous at bedtime  insulin lispro Injectable (HumaLOG) 3 Unit(s) SubCutaneous three times a day before meals  lactulose Syrup 30 Gram(s) Oral every 6 hours  linezolid    Tablet 600 milliGRAM(s) Oral every 12 hours  midodrine. 20 milliGRAM(s) Oral three times a day  nystatin    Suspension 903804 Unit(s) Oral four times a day  nystatin Cream 1 Application(s) Topical two times a day  ondansetron Injectable 4 milliGRAM(s) IV Push every 8 hours  pantoprazole  Injectable 40 milliGRAM(s) IV Push every 12 hours  polyethylene glycol 3350 17 Gram(s) Oral daily  rifAXIMin 550 milliGRAM(s) Oral two times a day  silver sulfADIAZINE 1% Cream 1 Application(s) Topical two times a day  tamsulosin 0.4 milliGRAM(s) Oral at bedtime  trimethoprim  160 mG/sulfamethoxazole 800 mG 1 Tablet(s) Oral daily  ursodiol Capsule 300 milliGRAM(s) Oral two times a day    PRN Inpatient Medications  acetaminophen   Tablet .. 650 milliGRAM(s) Oral every 8 hours PRN  dextrose 40% Gel 15 Gram(s) Oral once PRN  glucagon  Injectable 1 milliGRAM(s) IntraMuscular once PRN      REVIEW OF SYSTEMS  --------------------------------------------------------------------------------  Gen: No fevers/chills  Head/Eyes/Ears/Mouth: No headache; Normal hearing; Normal vision    Respiratory: No dyspnea, cough  CV: No chest pain  GI: No abdominal pain, diarrhea, constipation, nausea, vomiting  : No increased frequency, dysuria  MSK: No joint pain/swelling; no edema    All other systems were reviewed and are negative, except as noted.    VITALS/PHYSICAL EXAM  --------------------------------------------------------------------------------  T(C): 36.4 (06-18-20 @ 08:16), Max: 36.8 (06-17-20 @ 14:55)  HR: 80 (06-18-20 @ 08:16) (75 - 82)  BP: 122/60 (06-18-20 @ 08:16) (97/59 - 122/60)  RR: 18 (06-18-20 @ 08:16) (18 - 18)  SpO2: 98% (06-18-20 @ 08:16) (97% - 100%)  Wt(kg): --        06-17-20 @ 07:01  -  06-18-20 @ 07:00  --------------------------------------------------------  IN: 750 mL / OUT: 1600 mL / NET: -850 mL    06-18-20 @ 07:01  -  06-18-20 @ 13:44  --------------------------------------------------------  IN: 240 mL / OUT: 0 mL / NET: 240 mL      Physical Exam:  	  	Gen: NAD, well-appearing on room air  	HEENT: Scleral icterus  	Pulm: CTA B/L, no crackles  	CV: RRR, S1S2; no rub/murmur  	GI: +BS, soft, nontender  	: No suprapubic tenderness  	MSK: Warm, no edema              Neuro: Awake, mild asterixis   	Psych: Normal affect and mood  	Skin: Warm, no cyanosis    LABS/STUDIES  --------------------------------------------------------------------------------              7.6    3.35  >-----------<  51       [06-18-20 @ 07:09]              22.8     130  |  100  |  36  ----------------------------<  159      [06-18-20 @ 07:09]  5.3   |  19  |  1.71        Ca     10.1     [06-18-20 @ 07:09]    TPro  5.8  /  Alb  4.2  /  TBili  8.7  /  DBili  x   /  AST  20  /  ALT  11  /  AlkPhos  117  [06-18-20 @ 07:09]    PT/INR: PT 30.4 , INR 2.61       [06-18-20 @ 08:34]  PTT: 58.4       [06-18-20 @ 08:34]      Creatinine Trend:  SCr 1.71 [06-18 @ 07:09]  SCr 2.13 [06-17 @ 07:08]  SCr 1.72 [06-16 @ 05:25]  SCr 1.68 [06-15 @ 05:47]  SCr 1.74 [06-14 @ 07:17]              Urinalysis - [06-11-20 @ 19:57]      Color Yellow / Appearance Clear / SG 1.013 / pH 5.0      Gluc Negative / Ketone Negative  / Bili Negative / Urobili 2 mg/dL       Blood Small / Protein Negative / Leuk Est Large / Nitrite Negative      RBC 4 / WBC 54 / Hyaline 0 / Gran  / Sq Epi  / Non Sq Epi 0 / Bacteria Many    Urine Creatinine 61      [06-11-20 @ 19:57]  Urine Protein 10      [06-11-20 @ 19:57]  Urine Sodium 92      [06-14-20 @ 10:52]  Urine Urea Nitrogen 450      [06-12-20 @ 02:46]    Iron 160, TIBC Unable to calculate Test Repeated, %sat Unable to calculate Test Repeated      [06-12-20 @ 09:04]  Ferritin 493      [04-29-20 @ 08:20]  PTH -- (Ca 10.3)      [12-13-19 @ 08:11]   12  Vitamin D (25OH) 25.3      [12-13-19 @ 08:11]  HbA1c 6.4      [02-12-20 @ 17:08]  TSH 3.26      [04-26-20 @ 09:47]  Lipid: chol 71, TG 63, HDL 30, LDL 28      [11-27-19 @ 09:11]    HBsAb Nonreact      [06-13-20 @ 10:35]  HBsAg Nonreact      [06-13-20 @ 10:35]  HBcAb Nonreact      [06-13-20 @ 10:35]

## 2020-06-18 NOTE — PROGRESS NOTE ADULT - ATTENDING COMMENTS
updated wife Ada over the phone    Cary Rivera MD  Division of Hospital Medicine  Cell: [phone]  Pager: [phone]  Office: [phone]

## 2020-06-18 NOTE — CONSULT NOTE ADULT - ASSESSMENT
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    Decomprensated JEAN Cirrhosis  - VRE UTI to receive 7-day course of linezolid (6/15-21). Blood cultures negative. HE improving but not back to baseline.  - Left heel wound noted to have purulent drainage     # Hepatic encephalopathy: Improving, but not yet at his baseline. Continue rifaximin, Miralax, and lactulose and will titrate as needed.    # MISA on CKD: Transplant Nephrology input appreciated. Recommend repeat urinalysis and urine electrolytes for re-evaluation given worsened Cr today. Continue to hold diuretics. Continue IV albumin, and will consider adding octreotide if urine studies consistent with HRS. He is already on midodrine.    # Orthostatic hypotension: Chronic, related to end-stage liver disease. Continue home midodrine.    # Hyponatremia: Continue fluid restriction.    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 Receiving 1 unit PRBCs today for Hb 6.5, and will monitor for appropriate response.    # Decompensated JEAN cirrhosis: He is currently UNOS wait-listed for liver transplantation at [facility], blood type A, with MELD-Na 35 today. Please continue to check MELD labs daily. 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 now admitted with hepatic encephalopathy, found to have VRE UTI, also with MISA on CKD.    JEAN Cirrhosis/Hepatic encephalopathy/VRE UTI  - VRE UTI, colonization. To receive 7-day course of linezolid (6/15-21). Blood cultures negative. Continue Bactrim DS PO QD for UTI PPx (previous recurrent ESBL E coli infection)   - HE improving but not back to baseline.  Continue rifaximin, Miralax, and lactulose   - Right heel wound noted to have purulent drainage today.  Please debride wound and send culture if possible. Antibiotics as appropriate.  - UNOS waitlisted for Liver Transplant.  MELD 32    Blood type A negative   - Daily CBC, CMP, INR  - Remainder of care as per medicine and Hepatology  - Transplant surgery will continue to follow closely    Please page [phone] with any questions

## 2020-06-18 NOTE — PROGRESS NOTE ADULT - SUBJECTIVE AND OBJECTIVE BOX
Chief Complaint:  Patient is a 64y old  Male who presents with a chief complaint of Liver failure, hepatic encephalopathy (18 Jun 2020 11:17)    Reason for consult: cirrhosis, AMS    Interval Events: Cr improving after transfusion. INR slightly worse. Pt feels much better. Had 6 BMs in last 24h.     Hospital Medications:  acetaminophen   Tablet .. 650 milliGRAM(s) Oral every 8 hours PRN  dextrose 40% Gel 15 Gram(s) Oral once PRN  dextrose 5%. 1000 milliLiter(s) IV Continuous <Continuous>  dextrose 50% Injectable 12.5 Gram(s) IV Push once  dextrose 50% Injectable 25 Gram(s) IV Push once  dextrose 50% Injectable 25 Gram(s) IV Push once  folic acid 1 milliGRAM(s) Oral daily  glucagon  Injectable 1 milliGRAM(s) IntraMuscular once PRN  hepatitis B Vaccine - Adult (ENGERIX-B) 20 MICROGram(s) IntraMuscular once  insulin glargine Injectable (LANTUS) 4 Unit(s) SubCutaneous at bedtime  insulin lispro (HumaLOG) corrective regimen sliding scale   SubCutaneous three times a day before meals  insulin lispro (HumaLOG) corrective regimen sliding scale   SubCutaneous at bedtime  insulin lispro Injectable (HumaLOG) 3 Unit(s) SubCutaneous three times a day before meals  lactulose Syrup 30 Gram(s) Oral every 6 hours  linezolid    Tablet 600 milliGRAM(s) Oral every 12 hours  midodrine. 20 milliGRAM(s) Oral three times a day  nystatin    Suspension 291277 Unit(s) Oral four times a day  nystatin Cream 1 Application(s) Topical two times a day  ondansetron Injectable 4 milliGRAM(s) IV Push every 8 hours  pantoprazole  Injectable 40 milliGRAM(s) IV Push every 12 hours  polyethylene glycol 3350 17 Gram(s) Oral daily  rifAXIMin 550 milliGRAM(s) Oral two times a day  silver sulfADIAZINE 1% Cream 1 Application(s) Topical two times a day  tamsulosin 0.4 milliGRAM(s) Oral at bedtime  trimethoprim  160 mG/sulfamethoxazole 800 mG 1 Tablet(s) Oral daily  ursodiol Capsule 300 milliGRAM(s) Oral two times a day      ROS:   General:  No  fevers, chills, night sweats, fatigue  Eyes:  Good vision, no reported pain  ENT:  No sore throat, pain, runny nose  CV:  No pain, palpitations  Pulm:  No dyspnea, cough  GI:  See HPI, otherwise negative  :  No  incontinence, nocturia  Muscle:  No pain, weakness  Neuro:  No memory problems  Psych:  No insomnia, mood problems, depression  Endocrine:  No polyuria, polydipsia, cold/heat intolerance  Heme:  No petechiae, ecchymosis, easy bruisability  Skin:  No rash    PHYSICAL EXAM:   Vital Signs:  Vital Signs Last 24 Hrs  T(C): 36.4 (18 Jun 2020 08:16), Max: 36.8 (17 Jun 2020 14:55)  T(F): 97.5 (18 Jun 2020 08:16), Max: 98.2 (17 Jun 2020 14:55)  HR: 80 (18 Jun 2020 08:16) (75 - 82)  BP: 122/60 (18 Jun 2020 08:16) (97/59 - 122/60)  BP(mean): --  RR: 18 (18 Jun 2020 08:16) (18 - 18)  SpO2: 98% (18 Jun 2020 08:16) (97% - 100%)  Daily     Daily     GENERAL: no acute distress  NEURO: alert, oriented x 3, + asterixis  HEENT: anicteric sclera, no conjunctival pallor appreciated  CHEST: no respiratory distress, no accessory muscle use  CARDIAC: regular rate, rhythm  ABDOMEN: soft, non-tender, non-distended, no rebound or guarding  EXTREMITIES: warm, well perfused, no edema  SKIN: no lesions noted    LABS: reviewed                        7.6    3.35  )-----------( 51       ( 18 Jun 2020 07:09 )             22.8     06-18    130<L>  |  100  |  36<H>  ----------------------------<  159<H>  5.3   |  19<L>  |  1.71<H>    Ca    10.1      18 Jun 2020 07:09    TPro  5.8<L>  /  Alb  4.2  /  TBili  8.7<H>  /  DBili  x   /  AST  20  /  ALT  11  /  AlkPhos  117  06-18    LIVER FUNCTIONS - ( 18 Jun 2020 07:09 )  Alb: 4.2 g/dL / Pro: 5.8 g/dL / ALK PHOS: 117 U/L / ALT: 11 U/L / AST: 20 U/L / GGT: x             Interval Diagnostic Studies: see sunrise for full report Chief Complaint:  Patient is a 64y old  Male who presents with a chief complaint of Liver failure, hepatic encephalopathy (18 Jun 2020 11:17)    Reason for consult: cirrhosis, AMS    Interval Events: Cr improving after transfusion. INR slightly worse. Pt feels much better. Had 6 BMs in last 24h.     Hospital Medications:  acetaminophen   Tablet .. 650 milliGRAM(s) Oral every 8 hours PRN  dextrose 40% Gel 15 Gram(s) Oral once PRN  dextrose 5%. 1000 milliLiter(s) IV Continuous <Continuous>  dextrose 50% Injectable 12.5 Gram(s) IV Push once  dextrose 50% Injectable 25 Gram(s) IV Push once  dextrose 50% Injectable 25 Gram(s) IV Push once  folic acid 1 milliGRAM(s) Oral daily  glucagon  Injectable 1 milliGRAM(s) IntraMuscular once PRN  hepatitis B Vaccine - Adult (ENGERIX-B) 20 MICROGram(s) IntraMuscular once  insulin glargine Injectable (LANTUS) 4 Unit(s) SubCutaneous at bedtime  insulin lispro (HumaLOG) corrective regimen sliding scale   SubCutaneous three times a day before meals  insulin lispro (HumaLOG) corrective regimen sliding scale   SubCutaneous at bedtime  insulin lispro Injectable (HumaLOG) 3 Unit(s) SubCutaneous three times a day before meals  lactulose Syrup 30 Gram(s) Oral every 6 hours  linezolid    Tablet 600 milliGRAM(s) Oral every 12 hours  midodrine. 20 milliGRAM(s) Oral three times a day  nystatin    Suspension 171952 Unit(s) Oral four times a day  nystatin Cream 1 Application(s) Topical two times a day  ondansetron Injectable 4 milliGRAM(s) IV Push every 8 hours  pantoprazole  Injectable 40 milliGRAM(s) IV Push every 12 hours  polyethylene glycol 3350 17 Gram(s) Oral daily  rifAXIMin 550 milliGRAM(s) Oral two times a day  silver sulfADIAZINE 1% Cream 1 Application(s) Topical two times a day  tamsulosin 0.4 milliGRAM(s) Oral at bedtime  trimethoprim  160 mG/sulfamethoxazole 800 mG 1 Tablet(s) Oral daily  ursodiol Capsule 300 milliGRAM(s) Oral two times a day      ROS:   General:  No  fevers, chills, night sweats, fatigue  Eyes:  Good vision, no reported pain  ENT:  No sore throat, pain, runny nose  CV:  No pain, palpitations  Pulm:  No dyspnea, cough  GI:  See HPI, otherwise negative  :  No  incontinence, nocturia  Muscle:  No pain, weakness  Neuro:  No memory problems  Psych:  No insomnia, mood problems, depression  Endocrine:  No polyuria, polydipsia, cold/heat intolerance  Heme:  No petechiae, ecchymosis, easy bruisability  Skin:  No rash    PHYSICAL EXAM:   Vital Signs:  Vital Signs Last 24 Hrs  T(C): 36.4 (18 Jun 2020 08:16), Max: 36.8 (17 Jun 2020 14:55)  T(F): 97.5 (18 Jun 2020 08:16), Max: 98.2 (17 Jun 2020 14:55)  HR: 80 (18 Jun 2020 08:16) (75 - 82)  BP: 122/60 (18 Jun 2020 08:16) (97/59 - 122/60)  BP(mean): --  RR: 18 (18 Jun 2020 08:16) (18 - 18)  SpO2: 98% (18 Jun 2020 08:16) (97% - 100%)  Daily     Daily     GENERAL: no acute distress  NEURO: alert, oriented x 3, +asterixis  HEENT: anicteric sclera, no conjunctival pallor appreciated  CHEST: no respiratory distress, no accessory muscle use  CARDIAC: regular rate, rhythm  ABDOMEN: soft, non-tender, non-distended, no rebound or guarding  EXTREMITIES: warm, well perfused, no edema  SKIN: right heel with escar, scant yellowish drainage on bandage, no expressable fluid collection, no surrounding erythema    LABS: reviewed                        7.6    3.35  )-----------( 51       ( 18 Jun 2020 07:09 )             22.8     06-18    130<L>  |  100  |  36<H>  ----------------------------<  159<H>  5.3   |  19<L>  |  1.71<H>    Ca    10.1      18 Jun 2020 07:09    TPro  5.8<L>  /  Alb  4.2  /  TBili  8.7<H>  /  DBili  x   /  AST  20  /  ALT  11  /  AlkPhos  117  06-18    LIVER FUNCTIONS - ( 18 Jun 2020 07:09 )  Alb: 4.2 g/dL / Pro: 5.8 g/dL / ALK PHOS: 117 U/L / ALT: 11 U/L / AST: 20 U/L / GGT: x             Interval Diagnostic Studies: see sunrise for full report

## 2020-06-18 NOTE — PROGRESS NOTE ADULT - SUBJECTIVE AND OBJECTIVE BOX
Follow Up:  UTI    Interval History: noted by transplant surgery team to have purulent drainage from right heel eschar. denies urinary symptoms. mental status improved.     REVIEW OF SYSTEMS  [  ] ROS unobtainable because:    [  x] All other systems negative except as noted below    Constitutional:  [ ] fever [ ] chills  [ ] weight loss  [ ] weakness  Skin:  [ ] rash [ ] phlebitis	  Eyes: [ ] icterus [ ] pain  [ ] discharge	  ENMT: [ ] sore throat  [ ] thrush [ ] ulcers [ ] exudates  Respiratory: [ ] dyspnea [ ] hemoptysis [ ] cough [ ] sputum	  Cardiovascular:  [ ] chest pain [ ] palpitations [ ] edema	  Gastrointestinal:  [ ] nausea [ ] vomiting [ ] diarrhea [ ] constipation [ ] pain	  Genitourinary:  [ ] dysuria [ ] frequency [ ] hematuria [ ] discharge [ ] flank pain  [ ] incontinence  Musculoskeletal:  [ ] myalgias [ ] arthralgias [ ] arthritis  [ ] back pain  Neurological:  [ ] headache [ ] seizures  [ ] confusion/altered mental status    Allergies  codeine (Anaphylaxis)        ANTIMICROBIALS:  linezolid    Tablet 600 every 12 hours  nystatin    Suspension 715816 four times a day  rifAXIMin 550 two times a day  trimethoprim  160 mG/sulfamethoxazole 800 mG 1 daily      OTHER MEDS:  MEDICATIONS  (STANDING):  acetaminophen   Tablet .. 650 every 8 hours PRN  dextrose 40% Gel 15 once PRN  dextrose 50% Injectable 12.5 once  dextrose 50% Injectable 25 once  dextrose 50% Injectable 25 once  glucagon  Injectable 1 once PRN  hepatitis B Vaccine - Adult (ENGERIX-B) 20 once  insulin glargine Injectable (LANTUS) 4 at bedtime  insulin lispro (HumaLOG) corrective regimen sliding scale  three times a day before meals  insulin lispro (HumaLOG) corrective regimen sliding scale  at bedtime  insulin lispro Injectable (HumaLOG) 3 three times a day before meals  lactulose Syrup 30 every 6 hours  midodrine. 20 three times a day  ondansetron Injectable 4 every 8 hours  pantoprazole  Injectable 40 every 12 hours  polyethylene glycol 3350 17 daily  tamsulosin 0.4 at bedtime  ursodiol Capsule 300 two times a day      Vital Signs Last 24 Hrs  T(C): 36.3 (18 Jun 2020 16:27), Max: 36.4 (18 Jun 2020 08:16)  T(F): 97.3 (18 Jun 2020 16:27), Max: 97.5 (18 Jun 2020 08:16)  HR: 74 (18 Jun 2020 16:27) (74 - 80)  BP: 122/63 (18 Jun 2020 16:27) (114/63 - 122/63)  BP(mean): --  RR: 18 (18 Jun 2020 16:27) (18 - 18)  SpO2: 98% (18 Jun 2020 16:27) (97% - 98%)    PHYSICAL EXAMINATION:  General: Alert and Awake, oriented 3x, NAD, jaundice  HEENT: PERRL, EOMI, scleral icterus  Neck: Supple  Cardiac: RRR, No M/R/G  Resp: CTAB, No Wh/Rh/Ra  Abdomen: NBS, NT/ND, No HSM, No rigidity or guarding  MSK: R heel with eschar without surrounding drainage or underlying fluctuance. Some yellow drainage on the heel but patient had medihoney placed on the heel recently. 1+ LE edema. No Calf tenderness  : bob  Skin: No rashes or lesions. Skin is warm and dry to the touch.   Neuro: Alert and Awake. oriented 3x CN 2-12 Grossly intact. Moves all four extremities spontaneously.  Psych: Calm, Pleasant, Cooperative                          7.6    3.35  )-----------( 51       ( 18 Jun 2020 07:09 )             22.8       06-18    130<L>  |  100  |  36<H>  ----------------------------<  159<H>  5.3   |  19<L>  |  1.71<H>    Ca    10.1      18 Jun 2020 07:09    TPro  5.8<L>  /  Alb  4.2  /  TBili  8.7<H>  /  DBili  x   /  AST  20  /  ALT  11  /  AlkPhos  117  06-18          MICROBIOLOGY:  v  .Urine Catheterized  06-14-20   >100,000 CFU/ml Enterococcus faecium (vancomycin resistant)  --  Enterococcus faecium (vancomycin resistant)      .Urine Clean Catch (Midstream)  06-14-20   >100,000 CFU/ml Enterococcus faecium (vancomycin resistant)  --  Enterococcus faecium (vancomycin resistant)      .Blood Blood-Peripheral  06-12-20   No Growth Final  --  --        CMV IgG Antibody: <0.20 U/mL (12-04-19 @ 08:33)  Toxoplasma IgG Screen: <3.0 IU/mL (12-04-19 @ 08:33)          RADIOLOGY:    <The imaging below has been reviewed and visualized by me independently. Findings as detailed in report below>    EXAM:  FOOT COMPLETE RIGHT (MIN 3 VIEW)                        PROCEDURE DATE:  06/11/2020    No radiographic evidence for osteomyelitis of the right foot.

## 2020-06-19 LAB
ALBUMIN SERPL ELPH-MCNC: 4 G/DL — SIGNIFICANT CHANGE UP (ref 3.3–5)
ALP SERPL-CCNC: 118 U/L — SIGNIFICANT CHANGE UP (ref 40–120)
ALT FLD-CCNC: 12 U/L — SIGNIFICANT CHANGE UP (ref 10–45)
ANION GAP SERPL CALC-SCNC: 8 MMOL/L — SIGNIFICANT CHANGE UP (ref 5–17)
APTT BLD: 58 SEC — HIGH (ref 27.5–36.3)
AST SERPL-CCNC: 18 U/L — SIGNIFICANT CHANGE UP (ref 10–40)
BASOPHILS # BLD AUTO: 0.03 K/UL — SIGNIFICANT CHANGE UP (ref 0–0.2)
BASOPHILS NFR BLD AUTO: 0.8 % — SIGNIFICANT CHANGE UP (ref 0–2)
BILIRUB SERPL-MCNC: 9.1 MG/DL — HIGH (ref 0.2–1.2)
BUN SERPL-MCNC: 36 MG/DL — HIGH (ref 7–23)
CALCIUM SERPL-MCNC: 10.5 MG/DL — SIGNIFICANT CHANGE UP (ref 8.4–10.5)
CHLORIDE SERPL-SCNC: 99 MMOL/L — SIGNIFICANT CHANGE UP (ref 96–108)
CO2 SERPL-SCNC: 20 MMOL/L — LOW (ref 22–31)
CREAT SERPL-MCNC: 1.63 MG/DL — HIGH (ref 0.5–1.3)
EOSINOPHIL # BLD AUTO: 0.19 K/UL — SIGNIFICANT CHANGE UP (ref 0–0.5)
EOSINOPHIL NFR BLD AUTO: 5 % — SIGNIFICANT CHANGE UP (ref 0–6)
GLUCOSE SERPL-MCNC: 142 MG/DL — HIGH (ref 70–99)
HCT VFR BLD CALC: 24.2 % — LOW (ref 39–50)
HGB BLD-MCNC: 8.4 G/DL — LOW (ref 13–17)
IMM GRANULOCYTES NFR BLD AUTO: 0.3 % — SIGNIFICANT CHANGE UP (ref 0–1.5)
INR BLD: 2.43 RATIO — HIGH (ref 0.88–1.16)
LYMPHOCYTES # BLD AUTO: 1.16 K/UL — SIGNIFICANT CHANGE UP (ref 1–3.3)
LYMPHOCYTES # BLD AUTO: 30.6 % — SIGNIFICANT CHANGE UP (ref 13–44)
MCHC RBC-ENTMCNC: 34.7 GM/DL — SIGNIFICANT CHANGE UP (ref 32–36)
MCHC RBC-ENTMCNC: 35.4 PG — HIGH (ref 27–34)
MCV RBC AUTO: 102.1 FL — HIGH (ref 80–100)
MONOCYTES # BLD AUTO: 0.34 K/UL — SIGNIFICANT CHANGE UP (ref 0–0.9)
MONOCYTES NFR BLD AUTO: 9 % — SIGNIFICANT CHANGE UP (ref 2–14)
NEUTROPHILS # BLD AUTO: 2.06 K/UL — SIGNIFICANT CHANGE UP (ref 1.8–7.4)
NEUTROPHILS NFR BLD AUTO: 54.3 % — SIGNIFICANT CHANGE UP (ref 43–77)
NRBC # BLD: 0 /100 WBCS — SIGNIFICANT CHANGE UP (ref 0–0)
OSMOLALITY UR: 555 MOSM/KG — SIGNIFICANT CHANGE UP (ref 50–1200)
PLATELET # BLD AUTO: 47 K/UL — LOW (ref 150–400)
POTASSIUM SERPL-MCNC: 5.1 MMOL/L — SIGNIFICANT CHANGE UP (ref 3.5–5.3)
POTASSIUM SERPL-SCNC: 5.1 MMOL/L — SIGNIFICANT CHANGE UP (ref 3.5–5.3)
PROT SERPL-MCNC: 5.8 G/DL — LOW (ref 6–8.3)
PROTHROM AB SERPL-ACNC: 28.2 SEC — HIGH (ref 10–13.1)
RBC # BLD: 2.37 M/UL — LOW (ref 4.2–5.8)
RBC # FLD: 20.2 % — HIGH (ref 10.3–14.5)
SARS-COV-2 RNA SPEC QL NAA+PROBE: SIGNIFICANT CHANGE UP
SODIUM SERPL-SCNC: 127 MMOL/L — LOW (ref 135–145)
SODIUM UR-SCNC: 41 MMOL/L — SIGNIFICANT CHANGE UP
WBC # BLD: 3.79 K/UL — LOW (ref 3.8–10.5)
WBC # FLD AUTO: 3.79 K/UL — LOW (ref 3.8–10.5)

## 2020-06-19 PROCEDURE — 99232 SBSQ HOSP IP/OBS MODERATE 35: CPT

## 2020-06-19 PROCEDURE — 99232 SBSQ HOSP IP/OBS MODERATE 35: CPT | Mod: GC

## 2020-06-19 PROCEDURE — 99233 SBSQ HOSP IP/OBS HIGH 50: CPT

## 2020-06-19 RX ADMIN — Medication 2: at 12:31

## 2020-06-19 RX ADMIN — MIDODRINE HYDROCHLORIDE 20 MILLIGRAM(S): 2.5 TABLET ORAL at 17:16

## 2020-06-19 RX ADMIN — URSODIOL 300 MILLIGRAM(S): 250 TABLET, FILM COATED ORAL at 17:19

## 2020-06-19 RX ADMIN — Medication 1: at 09:20

## 2020-06-19 RX ADMIN — Medication 1 MILLIGRAM(S): at 11:30

## 2020-06-19 RX ADMIN — Medication 3 UNIT(S): at 12:31

## 2020-06-19 RX ADMIN — NYSTATIN CREAM 1 APPLICATION(S): 100000 CREAM TOPICAL at 06:21

## 2020-06-19 RX ADMIN — MIDODRINE HYDROCHLORIDE 20 MILLIGRAM(S): 2.5 TABLET ORAL at 11:30

## 2020-06-19 RX ADMIN — PANTOPRAZOLE SODIUM 40 MILLIGRAM(S): 20 TABLET, DELAYED RELEASE ORAL at 06:19

## 2020-06-19 RX ADMIN — Medication 500000 UNIT(S): at 23:52

## 2020-06-19 RX ADMIN — POLYETHYLENE GLYCOL 3350 17 GRAM(S): 17 POWDER, FOR SOLUTION ORAL at 11:29

## 2020-06-19 RX ADMIN — PANTOPRAZOLE SODIUM 40 MILLIGRAM(S): 20 TABLET, DELAYED RELEASE ORAL at 17:18

## 2020-06-19 RX ADMIN — Medication 3 UNIT(S): at 17:48

## 2020-06-19 RX ADMIN — Medication 500000 UNIT(S): at 06:17

## 2020-06-19 RX ADMIN — INSULIN GLARGINE 4 UNIT(S): 100 INJECTION, SOLUTION SUBCUTANEOUS at 22:27

## 2020-06-19 RX ADMIN — Medication 3 UNIT(S): at 09:20

## 2020-06-19 RX ADMIN — Medication 500000 UNIT(S): at 11:30

## 2020-06-19 RX ADMIN — LINEZOLID 600 MILLIGRAM(S): 600 INJECTION, SOLUTION INTRAVENOUS at 06:18

## 2020-06-19 RX ADMIN — Medication 1 APPLICATION(S): at 06:20

## 2020-06-19 RX ADMIN — Medication 1 TABLET(S): at 11:31

## 2020-06-19 RX ADMIN — Medication 500000 UNIT(S): at 17:17

## 2020-06-19 RX ADMIN — ONDANSETRON 4 MILLIGRAM(S): 8 TABLET, FILM COATED ORAL at 13:14

## 2020-06-19 RX ADMIN — ONDANSETRON 4 MILLIGRAM(S): 8 TABLET, FILM COATED ORAL at 06:19

## 2020-06-19 RX ADMIN — ONDANSETRON 4 MILLIGRAM(S): 8 TABLET, FILM COATED ORAL at 22:28

## 2020-06-19 RX ADMIN — Medication 2: at 17:48

## 2020-06-19 RX ADMIN — Medication 1 APPLICATION(S): at 17:17

## 2020-06-19 RX ADMIN — MIDODRINE HYDROCHLORIDE 20 MILLIGRAM(S): 2.5 TABLET ORAL at 06:18

## 2020-06-19 RX ADMIN — LINEZOLID 600 MILLIGRAM(S): 600 INJECTION, SOLUTION INTRAVENOUS at 17:17

## 2020-06-19 RX ADMIN — NYSTATIN CREAM 1 APPLICATION(S): 100000 CREAM TOPICAL at 17:18

## 2020-06-19 RX ADMIN — LACTULOSE 30 GRAM(S): 10 SOLUTION ORAL at 11:31

## 2020-06-19 RX ADMIN — LACTULOSE 30 GRAM(S): 10 SOLUTION ORAL at 23:51

## 2020-06-19 RX ADMIN — LACTULOSE 30 GRAM(S): 10 SOLUTION ORAL at 17:17

## 2020-06-19 RX ADMIN — URSODIOL 300 MILLIGRAM(S): 250 TABLET, FILM COATED ORAL at 06:18

## 2020-06-19 RX ADMIN — TAMSULOSIN HYDROCHLORIDE 0.4 MILLIGRAM(S): 0.4 CAPSULE ORAL at 22:28

## 2020-06-19 RX ADMIN — LACTULOSE 30 GRAM(S): 10 SOLUTION ORAL at 06:17

## 2020-06-19 NOTE — PROGRESS NOTE ADULT - PROBLEM SELECTOR PLAN 5
SCr now trending down to 1.6  - net negative 850cc  - appreciate nephrology recs: no indication for RRT at this time  - continue to hold diuretics  - bob in place  - continue to monitor BMP

## 2020-06-19 NOTE — PROGRESS NOTE ADULT - SUBJECTIVE AND OBJECTIVE BOX
Patient is a 64y old  Male who presents with a chief complaint of Liver failure, hepatic encephalopathy (19 Jun 2020 09:50)      SUBJECTIVE / OVERNIGHT EVENTS:  no acute events overnight, vss, afebrile  pt alert this morning, excited to have visitor today so can see Ada (wife)  has mild pain in the right heel but at baseline  has no other complaints    ROS:  14 point ROS negative in detail except stated as above    MEDICATIONS  (STANDING):  dextrose 5%. 1000 milliLiter(s) (50 mL/Hr) IV Continuous <Continuous>  dextrose 50% Injectable 12.5 Gram(s) IV Push once  dextrose 50% Injectable 25 Gram(s) IV Push once  dextrose 50% Injectable 25 Gram(s) IV Push once  folic acid 1 milliGRAM(s) Oral daily  hepatitis B Vaccine - Adult (ENGERIX-B) 20 MICROGram(s) IntraMuscular once  insulin glargine Injectable (LANTUS) 4 Unit(s) SubCutaneous at bedtime  insulin lispro (HumaLOG) corrective regimen sliding scale   SubCutaneous three times a day before meals  insulin lispro (HumaLOG) corrective regimen sliding scale   SubCutaneous at bedtime  insulin lispro Injectable (HumaLOG) 3 Unit(s) SubCutaneous three times a day before meals  lactulose Syrup 30 Gram(s) Oral every 6 hours  linezolid    Tablet 600 milliGRAM(s) Oral every 12 hours  midodrine. 20 milliGRAM(s) Oral three times a day  nystatin    Suspension 220829 Unit(s) Oral four times a day  nystatin Cream 1 Application(s) Topical two times a day  ondansetron Injectable 4 milliGRAM(s) IV Push every 8 hours  pantoprazole  Injectable 40 milliGRAM(s) IV Push every 12 hours  polyethylene glycol 3350 17 Gram(s) Oral daily  rifAXIMin 550 milliGRAM(s) Oral two times a day  silver sulfADIAZINE 1% Cream 1 Application(s) Topical two times a day  tamsulosin 0.4 milliGRAM(s) Oral at bedtime  trimethoprim  160 mG/sulfamethoxazole 800 mG 1 Tablet(s) Oral daily  ursodiol Capsule 300 milliGRAM(s) Oral two times a day    MEDICATIONS  (PRN):  acetaminophen   Tablet .. 650 milliGRAM(s) Oral every 8 hours PRN Moderate Pain (4 - 6)  dextrose 40% Gel 15 Gram(s) Oral once PRN Blood Glucose LESS THAN 70 milliGRAM(s)/deciliter  glucagon  Injectable 1 milliGRAM(s) IntraMuscular once PRN Glucose LESS THAN 70 milligrams/deciliter      CAPILLARY BLOOD GLUCOSE      POCT Blood Glucose.: 152 mg/dL (19 Jun 2020 08:27)  POCT Blood Glucose.: 181 mg/dL (18 Jun 2020 21:22)  POCT Blood Glucose.: 213 mg/dL (18 Jun 2020 17:23)  POCT Blood Glucose.: 207 mg/dL (18 Jun 2020 12:53)    I&O's Summary    18 Jun 2020 07:01  -  19 Jun 2020 07:00  --------------------------------------------------------  IN: 720 mL / OUT: 1700 mL / NET: -980 mL        PHYSICAL EXAM:  Vital Signs Last 24 Hrs  T(C): 36.1 (19 Jun 2020 08:30), Max: 36.4 (18 Jun 2020 21:28)  T(F): 97 (19 Jun 2020 08:30), Max: 97.6 (19 Jun 2020 01:06)  HR: 80 (19 Jun 2020 08:30) (74 - 80)  BP: 105/58 (19 Jun 2020 08:30) (105/58 - 145/63)  BP(mean): --  RR: 18 (19 Jun 2020 08:30) (18 - 18)  SpO2: 98% (19 Jun 2020 08:30) (98% - 99%)    GENERAL: NAD, well-developed  HEAD:  Atraumatic, Normocephalic  EYES: EOMI, PERRLA, conjunctiva and sclera clear  NECK: Supple, No JVD  CHEST/LUNG: Clear to auscultation bilaterally; No wheeze  HEART: Regular rate and rhythm; No murmurs, rubs, or gallops  ABDOMEN: Soft, Nontender, Nondistended; Bowel sounds present  EXTREMITIES: R heel ulcer - cdi';  2+ Peripheral Pulses, No clubbing, cyanosis, or edema  NEUROLOGY: AAOx3; non-focal  SKIN: No rashes or lesions    LABS:  personally reviewed                        8.4    3.79  )-----------( 47       ( 19 Jun 2020 06:56 )             24.2     06-19    127<L>  |  99  |  36<H>  ----------------------------<  142<H>  5.1   |  20<L>  |  1.63<H>    Ca    10.5      19 Jun 2020 07:16  Phos  3.5     06-19  Mg     1.7     06-19    TPro  5.8<L>  /  Alb  4.0  /  TBili  9.1<H>  /  DBili  x   /  AST  18  /  ALT  12  /  AlkPhos  118  06-19    PT/INR - ( 19 Jun 2020 08:37 )   PT: 28.2 sec;   INR: 2.43 ratio         PTT - ( 19 Jun 2020 08:37 )  PTT:58.0 sec          RADIOLOGY & ADDITIONAL TESTS:    Imaging Personally Reviewed:  - MRI foot  < from: MR Ankle w/wo IV Cont, Right (06.18.20 @ 23:04) >  Evaluation for soft tissue infection is limited without intravenous contrast. Although there is heterogeneous marrow signal there is no skin ulceration, sinus tract or abscess identified with adjacent marrow signal abnormality suggest osteomyelitis.     There is edema within the abductor hallucis and flexor digitorum brevis muscles. There is minimal edema and marked atrophy within the adductor digit minimi muscle. These findings are likely related to denervation edema. The tendons at the ankle and hindfoot are intact. There is mild flexor hallucis longus tenosynovitis. There is mild spurring at the Achilles insertion. There is marked thickening at the middle cord of the plantar fascia which contains a small ossicle which is chronic. There is also focal thickening at the proximal lateral cortex of the plantar fascia likely related to old injury.     There is marked spurring dorsally at the second tarsometatarsal joint. There is edema within the sinus Tarsi with scarring.     The Lisfranc ligament is at the edge of the field although the interosseous ligament is grossly intact. The dorsal component is markedly thickened and chronically degenerated or torn. The distal syndesmotic ligaments are intact. The lateral collateral ligaments are intact. There is thickening at the deltoid ligament which is continuous.    Impression: Evaluation for soft tissue infection is limited without intravenous contrast. Heterogeneous marrow signal without an adjacent skin ulceration or fluid collection to suggest osteomyelitis.    Degenerative changes and denervation edema as above.    < end of copied text >      Consultant(s) Notes Reviewed:  ID, hepatology, nephrology, podiatry    Care Discussed with Consultants/Other Providers: Dr. Mo (hepatology), Dr. Pérez (ID)

## 2020-06-19 NOTE — PROGRESS NOTE ADULT - SUBJECTIVE AND OBJECTIVE BOX
[facility] DIVISION OF KIDNEY DISEASES AND HYPERTENSION -- FOLLOW UP NOTE  --------------------------------------------------------------------------------  Abelino Patterson   Nephrology Fellow  Pager NS: [phone]/ [facility]: [phone]  (After 5 pm or on weekends please page the on-call fellow)    64M PMHx HFpEF, decompensated cirrhosis 2/2 EJAN (enlisted for liver transplant) c/b ascites, hx SBP, duodenal AVM and DM2 who admitted to [facility] for acute encephalopathy. Nephrology consulted for MISA.  Upon review of [facility]/Sunrise, baseline sCr 1.0 in 5/2020). On admission serum creatinine 1.57 on 6/11 which worsened to 1.75 on 6/13.  Urinalysis showed UTI no protein/rbc.  Home medication Lasix and aldactone (held during this hospitalization). On admission, pt was hypotensive and started on midodrine and albumin however serum Cr without improvement.  Of note, prior MISA workup in Dec 2019 showed elevated K/L ratio 2.91 (K>L) and immunofixation showing "one Weak IgA Lambda  Band and One Weak IgG Lambda Band Identified" otherwise normal complement, neg SRAVAN/dsDNA/ANCA.      24 hour events:   Mental status improving   UO: 1.7L over the past 24 hours  BUN/Scr: 36/1.63, K 5.1, Na 127          PAST HISTORY  --------------------------------------------------------------------------------  No significant changes to PMH, PSH, FHx, SHx, unless otherwise noted    ALLERGIES & MEDICATIONS  --------------------------------------------------------------------------------  Allergies    codeine (Anaphylaxis)    Intolerances      Standing Inpatient Medications  dextrose 5%. 1000 milliLiter(s) IV Continuous <Continuous>  dextrose 50% Injectable 12.5 Gram(s) IV Push once  dextrose 50% Injectable 25 Gram(s) IV Push once  dextrose 50% Injectable 25 Gram(s) IV Push once  folic acid 1 milliGRAM(s) Oral daily  hepatitis B Vaccine - Adult (ENGERIX-B) 20 MICROGram(s) IntraMuscular once  insulin glargine Injectable (LANTUS) 4 Unit(s) SubCutaneous at bedtime  insulin lispro (HumaLOG) corrective regimen sliding scale   SubCutaneous three times a day before meals  insulin lispro (HumaLOG) corrective regimen sliding scale   SubCutaneous at bedtime  insulin lispro Injectable (HumaLOG) 3 Unit(s) SubCutaneous three times a day before meals  lactulose Syrup 30 Gram(s) Oral every 6 hours  linezolid    Tablet 600 milliGRAM(s) Oral every 12 hours  midodrine. 20 milliGRAM(s) Oral three times a day  nystatin    Suspension 414625 Unit(s) Oral four times a day  nystatin Cream 1 Application(s) Topical two times a day  ondansetron Injectable 4 milliGRAM(s) IV Push every 8 hours  pantoprazole  Injectable 40 milliGRAM(s) IV Push every 12 hours  polyethylene glycol 3350 17 Gram(s) Oral daily  rifAXIMin 550 milliGRAM(s) Oral two times a day  silver sulfADIAZINE 1% Cream 1 Application(s) Topical two times a day  tamsulosin 0.4 milliGRAM(s) Oral at bedtime  trimethoprim  160 mG/sulfamethoxazole 800 mG 1 Tablet(s) Oral daily  ursodiol Capsule 300 milliGRAM(s) Oral two times a day    PRN Inpatient Medications  acetaminophen   Tablet .. 650 milliGRAM(s) Oral every 8 hours PRN  dextrose 40% Gel 15 Gram(s) Oral once PRN  glucagon  Injectable 1 milliGRAM(s) IntraMuscular once PRN      REVIEW OF SYSTEMS  --------------------------------------------------------------------------------  Gen: No fevers/chills  Head/Eyes/Ears/Mouth: No headache; Normal hearing; Normal vision    Respiratory: No dyspnea, cough  CV: No chest pain  GI: No abdominal pain, diarrhea, constipation, nausea, vomiting  : No increased frequency, dysuria  MSK: No joint pain/swelling; no edema    All other systems were reviewed and are negative, except as noted.    >>> <<<    VITALS/PHYSICAL EXAM  --------------------------------------------------------------------------------  T(C): 36.1 (06-19-20 @ 08:30), Max: 36.4 (06-18-20 @ 21:28)  HR: 80 (06-19-20 @ 08:30) (74 - 80)  BP: 105/58 (06-19-20 @ 08:30) (105/58 - 145/63)  RR: 18 (06-19-20 @ 08:30) (18 - 18)  SpO2: 98% (06-19-20 @ 08:30) (98% - 99%)  Wt(kg): --        06-18-20 @ 07:01  -  06-19-20 @ 07:00  --------------------------------------------------------  IN: 720 mL / OUT: 1700 mL / NET: -980 mL      Physical Exam:    	Gen: NAD, well-appearing on room air  	HEENT: Scleral icterus  	Pulm: CTA B/L, no crackles  	CV: RRR, S1S2; no rub/murmur  	GI: +BS, soft, nontender  	: No suprapubic tenderness, + bob  	MSK: Warm, no edema              Neuro: Awake, mild asterixis   	Psych: Normal affect and mood  	Skin: Warm, no cyanosis      LABS/STUDIES  --------------------------------------------------------------------------------              8.4    3.79  >-----------<  47       [06-19-20 @ 06:56]              24.2     127  |  99  |  36  ----------------------------<  142      [06-19-20 @ 07:16]  5.1   |  20  |  1.63        Ca     10.5     [06-19-20 @ 07:16]      Mg     1.7     [06-19-20 @ 07:16]      Phos  3.5     [06-19-20 @ 07:16]    TPro  5.8  /  Alb  4.0  /  TBili  9.1  /  DBili  x   /  AST  18  /  ALT  12  /  AlkPhos  118  [06-19-20 @ 07:16]    PT/INR: PT 28.2 , INR 2.43       [06-19-20 @ 08:37]  PTT: 58.0       [06-19-20 @ 08:37]      Creatinine Trend:  SCr 1.63 [06-19 @ 07:16]  SCr 1.71 [06-18 @ 07:09]  SCr 2.13 [06-17 @ 07:08]  SCr 1.72 [06-16 @ 05:25]  SCr 1.68 [06-15 @ 05:47]    Urinalysis - [06-11-20 @ 19:57]      Color Yellow / Appearance Clear / SG 1.013 / pH 5.0      Gluc Negative / Ketone Negative  / Bili Negative / Urobili 2 mg/dL       Blood Small / Protein Negative / Leuk Est Large / Nitrite Negative      RBC 4 / WBC 54 / Hyaline 0 / Gran  / Sq Epi  / Non Sq Epi 0 / Bacteria Many    Urine Sodium 92      [06-14-20 @ 10:52]    Iron 160, TIBC Unable to calculate Test Repeated, %sat Unable to calculate Test Repeated      [06-12-20 @ 09:04]  Ferritin 493      [04-29-20 @ 08:20]  PTH -- (Ca 10.3)      [12-13-19 @ 08:11]   12  Vitamin D (25OH) 25.3      [12-13-19 @ 08:11]  HbA1c 6.4      [02-12-20 @ 17:08]  TSH 3.26      [04-26-20 @ 09:47]  Lipid: chol 71, TG 63, HDL 30, LDL 28      [11-27-19 @ 09:11]    HBsAb Nonreact      [06-13-20 @ 10:35]  HBsAg Nonreact      [06-13-20 @ 10:35]  HBcAb Nonreact      [06-13-20 @ 10:35]

## 2020-06-19 NOTE — PROGRESS NOTE ADULT - ASSESSMENT
63 y/o M PMHx decompensated JEAN Cirrhosis on transplant list, DMII, HFpEF, recent admission for ESBL E coli prostatic abscess 2/2 4 wks ertapenem, hx of ESBL UTIs, recent VRE urinary colonization came to hospital for worsening jaundice and episode of confusion, resolved PTA.     CT Pelvis with no evidence of abscess (but noncontrast)  BCx 6/12 with NGTD  UCx 6/14 with gram positive organisms (suspect VRE - had this previously)    Given worsening confusion out of proportion to patient's previous presentations for HE favor covering for VRE  Linezolid CAN interact with Midodrine and Tramadol (potential for serotonin syndrome but low likelihood - would monitor)  Mental status improved after initiation of linezolid  Favor completing 7 day empiric coverage for VRE    Noted to have purulent drainage from the right heel eschar by transplant surgery  No obvious drainage at time of my assessment   Podiatry without concerns for infection on reevaluation  MRI of foot without obvious infection or OM    RECOMMENDATIONS    #R Heel Eschar  --Monitor off of antibiotics     #Encephalopathy, Positive UCx, VRE Colonization  --Continue Linezolid 600 mg PO Q12H (End Date: 6/21/20)  --Continue to follow CBC with diff  --Continue to follow temperature curve  --Follow up on preliminary blood cultures    #MISA, Prophylactic Antibiotic  --Appreciate Nephro and transplant hepatology recommendations  --Continue Bactrim DS PO QD for now for UTI PPx (previous recurrent ESBL E coli infection) - doubt Bactrim etiology of MISA but would continue to follow renal function (Cr/BUN)    #Pre-OLT Evaluation, Encounter for Vaccination,  --No absolute ID contraindication for OLT. Patient's mental status has improved and blood cultures are with no growth to date.   --Patient will require Daptomycin and Meropenem for danilo-operative prophylaxis  --PCV 13 and 23 vaccination completed February 2020  --Will require HBV Vaccination - would give dose prior to discharge    I will be away over this upcoming weekend. Please contact the Infectious Diseases Office with any further questions or concerns.     Eusebio Pérez M.D.  [facility] Division of Infectious Disease  8AM-5PM: Pager Number [phone]  After Hours (or if no response): Please contact the Infectious Diseases Office at [phone]     The above assessment and plan were discussed with Dr Cary Rivera Dr Letitia Mo

## 2020-06-19 NOTE — PROGRESS NOTE ADULT - ATTENDING COMMENTS
65 y/o man cirrhosis due to JEAN complicated by ascites, history of SBP and encephalopathy. Admitted for worsening encephalopathy consulted for worsening MISA. Baseline creatinine 1mg/dL. Cr elevated to 1.75mg/dL on 6/13/ Diuretics (Lasix and aldactone) held and he was started on albumin and midodrine. 's, UOP 1.5 liters.   On exam awake and alert, icteric, mild asterixes, abdomen distended with moderate ascites but soft, non tender, no extremity edema.     Labs: Na 127, Cr 1.63, Hgb 8.4     Impression   MISA in the setting of VRE UTI likely ATN. Cr slowly improving. Has ascites but no other signs of gross fluid overload. BP improved on midodrine.  He has worsening hyponatremia.  Anemia stable s/p PRBC transfusion.     Recommend   Free water restriction 1liter/day, liberalize sodium and protein intake for rx of hyponatremia.  Continue to hold diuretics

## 2020-06-19 NOTE — PROGRESS NOTE ADULT - SUBJECTIVE AND OBJECTIVE BOX
Chief Complaint:  Patient is a 64y old  Male who presents with a chief complaint of Liver failure, hepatic encephalopathy (19 Jun 2020 09:18)    Reason for consult: HE, cirrhosis    Interval Events: Pt feels better, continues to have 5-6 BMs per day, Cr improving, Na worsening. MELD-Na stable @ 33.    Hospital Medications:  acetaminophen   Tablet .. 650 milliGRAM(s) Oral every 8 hours PRN  dextrose 40% Gel 15 Gram(s) Oral once PRN  dextrose 5%. 1000 milliLiter(s) IV Continuous <Continuous>  dextrose 50% Injectable 12.5 Gram(s) IV Push once  dextrose 50% Injectable 25 Gram(s) IV Push once  dextrose 50% Injectable 25 Gram(s) IV Push once  folic acid 1 milliGRAM(s) Oral daily  glucagon  Injectable 1 milliGRAM(s) IntraMuscular once PRN  hepatitis B Vaccine - Adult (ENGERIX-B) 20 MICROGram(s) IntraMuscular once  insulin glargine Injectable (LANTUS) 4 Unit(s) SubCutaneous at bedtime  insulin lispro (HumaLOG) corrective regimen sliding scale   SubCutaneous three times a day before meals  insulin lispro (HumaLOG) corrective regimen sliding scale   SubCutaneous at bedtime  insulin lispro Injectable (HumaLOG) 3 Unit(s) SubCutaneous three times a day before meals  lactulose Syrup 30 Gram(s) Oral every 6 hours  linezolid    Tablet 600 milliGRAM(s) Oral every 12 hours  midodrine. 20 milliGRAM(s) Oral three times a day  nystatin    Suspension 584739 Unit(s) Oral four times a day  nystatin Cream 1 Application(s) Topical two times a day  ondansetron Injectable 4 milliGRAM(s) IV Push every 8 hours  pantoprazole  Injectable 40 milliGRAM(s) IV Push every 12 hours  polyethylene glycol 3350 17 Gram(s) Oral daily  rifAXIMin 550 milliGRAM(s) Oral two times a day  silver sulfADIAZINE 1% Cream 1 Application(s) Topical two times a day  tamsulosin 0.4 milliGRAM(s) Oral at bedtime  trimethoprim  160 mG/sulfamethoxazole 800 mG 1 Tablet(s) Oral daily  ursodiol Capsule 300 milliGRAM(s) Oral two times a day      ROS:   General:  No  fevers, chills, night sweats, fatigue  Eyes:  Good vision, no reported pain  ENT:  No sore throat, pain, runny nose  CV:  No pain, palpitations  Pulm:  No dyspnea, cough  GI:  See HPI, otherwise negative  :  No  incontinence, nocturia  Muscle:  No pain, weakness  Neuro:  No memory problems  Psych:  No insomnia, mood problems, depression  Endocrine:  No polyuria, polydipsia, cold/heat intolerance  Heme:  No petechiae, ecchymosis, easy bruisability  Skin:  No rash    PHYSICAL EXAM:   Vital Signs:  Vital Signs Last 24 Hrs  T(C): 36.1 (19 Jun 2020 08:30), Max: 36.4 (18 Jun 2020 21:28)  T(F): 97 (19 Jun 2020 08:30), Max: 97.6 (19 Jun 2020 01:06)  HR: 80 (19 Jun 2020 08:30) (74 - 80)  BP: 105/58 (19 Jun 2020 08:30) (105/58 - 145/63)  BP(mean): --  RR: 18 (19 Jun 2020 08:30) (18 - 18)  SpO2: 98% (19 Jun 2020 08:30) (98% - 99%)  Daily     Daily     GENERAL: no acute distress  NEURO: alert, minimal asterixis, oriented x 3, able to spell WORLD backwards  HEENT: anicteric sclera, no conjunctival pallor appreciated  CHEST: no respiratory distress, no accessory muscle use  CARDIAC: regular rate, rhythm  ABDOMEN: soft, non-tender, non-distended, no rebound or guarding  EXTREMITIES: warm, well perfused, no edema  SKIN: no lesions noted    LABS: reviewed                        8.4    3.79  )-----------( 47       ( 19 Jun 2020 06:56 )             24.2     06-19    127<L>  |  99  |  36<H>  ----------------------------<  142<H>  5.1   |  20<L>  |  1.63<H>    Ca    10.5      19 Jun 2020 07:16  Phos  3.5     06-19  Mg     1.7     06-19    TPro  5.8<L>  /  Alb  4.0  /  TBili  9.1<H>  /  DBili  x   /  AST  18  /  ALT  12  /  AlkPhos  118  06-19    LIVER FUNCTIONS - ( 19 Jun 2020 07:16 )  Alb: 4.0 g/dL / Pro: 5.8 g/dL / ALK PHOS: 118 U/L / ALT: 12 U/L / AST: 18 U/L / GGT: x             Interval Diagnostic Studies: see sunrise for full report Chief Complaint:  Patient is a 64y old  Male who presents with a chief complaint of Liver failure, hepatic encephalopathy (19 Jun 2020 09:18)    Reason for consult: HE, cirrhosis    Interval Events: Pt feels better, continues to have 5-6 BMs per day, Cr improving, Na worsening. MELD-Na stable @ 33. Some c/f infection of heel but re-evaluted by podiatry.     Hospital Medications:  acetaminophen   Tablet .. 650 milliGRAM(s) Oral every 8 hours PRN  dextrose 40% Gel 15 Gram(s) Oral once PRN  dextrose 5%. 1000 milliLiter(s) IV Continuous <Continuous>  dextrose 50% Injectable 12.5 Gram(s) IV Push once  dextrose 50% Injectable 25 Gram(s) IV Push once  dextrose 50% Injectable 25 Gram(s) IV Push once  folic acid 1 milliGRAM(s) Oral daily  glucagon  Injectable 1 milliGRAM(s) IntraMuscular once PRN  hepatitis B Vaccine - Adult (ENGERIX-B) 20 MICROGram(s) IntraMuscular once  insulin glargine Injectable (LANTUS) 4 Unit(s) SubCutaneous at bedtime  insulin lispro (HumaLOG) corrective regimen sliding scale   SubCutaneous three times a day before meals  insulin lispro (HumaLOG) corrective regimen sliding scale   SubCutaneous at bedtime  insulin lispro Injectable (HumaLOG) 3 Unit(s) SubCutaneous three times a day before meals  lactulose Syrup 30 Gram(s) Oral every 6 hours  linezolid    Tablet 600 milliGRAM(s) Oral every 12 hours  midodrine. 20 milliGRAM(s) Oral three times a day  nystatin    Suspension 325688 Unit(s) Oral four times a day  nystatin Cream 1 Application(s) Topical two times a day  ondansetron Injectable 4 milliGRAM(s) IV Push every 8 hours  pantoprazole  Injectable 40 milliGRAM(s) IV Push every 12 hours  polyethylene glycol 3350 17 Gram(s) Oral daily  rifAXIMin 550 milliGRAM(s) Oral two times a day  silver sulfADIAZINE 1% Cream 1 Application(s) Topical two times a day  tamsulosin 0.4 milliGRAM(s) Oral at bedtime  trimethoprim  160 mG/sulfamethoxazole 800 mG 1 Tablet(s) Oral daily  ursodiol Capsule 300 milliGRAM(s) Oral two times a day      ROS:   General:  No  fevers, chills, night sweats, fatigue  Eyes:  Good vision, no reported pain  ENT:  No sore throat, pain, runny nose  CV:  No pain, palpitations  Pulm:  No dyspnea, cough  GI:  See HPI, otherwise negative  :  No  incontinence, nocturia  Muscle:  No pain, weakness  Neuro:  No memory problems  Psych:  No insomnia, mood problems, depression  Endocrine:  No polyuria, polydipsia, cold/heat intolerance  Heme:  No petechiae, ecchymosis, easy bruisability  Skin:  No rash    PHYSICAL EXAM:   Vital Signs:  Vital Signs Last 24 Hrs  T(C): 36.1 (19 Jun 2020 08:30), Max: 36.4 (18 Jun 2020 21:28)  T(F): 97 (19 Jun 2020 08:30), Max: 97.6 (19 Jun 2020 01:06)  HR: 80 (19 Jun 2020 08:30) (74 - 80)  BP: 105/58 (19 Jun 2020 08:30) (105/58 - 145/63)  BP(mean): --  RR: 18 (19 Jun 2020 08:30) (18 - 18)  SpO2: 98% (19 Jun 2020 08:30) (98% - 99%)  Daily     Daily     GENERAL: no acute distress  NEURO: alert, minimal asterixis, oriented x 3, able to spell WORLD backwards  HEENT: anicteric sclera, no conjunctival pallor appreciated  CHEST: no respiratory distress, no accessory muscle use  CARDIAC: regular rate, rhythm  ABDOMEN: soft, non-tender, non-distended, no rebound or guarding  EXTREMITIES: warm, well perfused, no edema  SKIN: no lesions noted    LABS: reviewed                        8.4    3.79  )-----------( 47       ( 19 Jun 2020 06:56 )             24.2     06-19    127<L>  |  99  |  36<H>  ----------------------------<  142<H>  5.1   |  20<L>  |  1.63<H>    Ca    10.5      19 Jun 2020 07:16  Phos  3.5     06-19  Mg     1.7     06-19    TPro  5.8<L>  /  Alb  4.0  /  TBili  9.1<H>  /  DBili  x   /  AST  18  /  ALT  12  /  AlkPhos  118  06-19    LIVER FUNCTIONS - ( 19 Jun 2020 07:16 )  Alb: 4.0 g/dL / Pro: 5.8 g/dL / ALK PHOS: 118 U/L / ALT: 12 U/L / AST: 18 U/L / GGT: x             Interval Diagnostic Studies: see sunrise for full report Chief Complaint:  Patient is a 64y old  Male who presents with a chief complaint of Liver failure, hepatic encephalopathy (19 Jun 2020 09:18)    Reason for consult: HE, cirrhosis    Interval Events: Pt feels better, continues to have 5-6 BMs per day, Cr improving, Na worsening. MELD-Na stable @ 33. No evidence of active infection of heel including repeat MRI done.    Hospital Medications:  acetaminophen   Tablet .. 650 milliGRAM(s) Oral every 8 hours PRN  dextrose 40% Gel 15 Gram(s) Oral once PRN  dextrose 5%. 1000 milliLiter(s) IV Continuous <Continuous>  dextrose 50% Injectable 12.5 Gram(s) IV Push once  dextrose 50% Injectable 25 Gram(s) IV Push once  dextrose 50% Injectable 25 Gram(s) IV Push once  folic acid 1 milliGRAM(s) Oral daily  glucagon  Injectable 1 milliGRAM(s) IntraMuscular once PRN  hepatitis B Vaccine - Adult (ENGERIX-B) 20 MICROGram(s) IntraMuscular once  insulin glargine Injectable (LANTUS) 4 Unit(s) SubCutaneous at bedtime  insulin lispro (HumaLOG) corrective regimen sliding scale   SubCutaneous three times a day before meals  insulin lispro (HumaLOG) corrective regimen sliding scale   SubCutaneous at bedtime  insulin lispro Injectable (HumaLOG) 3 Unit(s) SubCutaneous three times a day before meals  lactulose Syrup 30 Gram(s) Oral every 6 hours  linezolid    Tablet 600 milliGRAM(s) Oral every 12 hours  midodrine. 20 milliGRAM(s) Oral three times a day  nystatin    Suspension 723340 Unit(s) Oral four times a day  nystatin Cream 1 Application(s) Topical two times a day  ondansetron Injectable 4 milliGRAM(s) IV Push every 8 hours  pantoprazole  Injectable 40 milliGRAM(s) IV Push every 12 hours  polyethylene glycol 3350 17 Gram(s) Oral daily  rifAXIMin 550 milliGRAM(s) Oral two times a day  silver sulfADIAZINE 1% Cream 1 Application(s) Topical two times a day  tamsulosin 0.4 milliGRAM(s) Oral at bedtime  trimethoprim  160 mG/sulfamethoxazole 800 mG 1 Tablet(s) Oral daily  ursodiol Capsule 300 milliGRAM(s) Oral two times a day      ROS:   General:  No  fevers, chills, night sweats, fatigue  Eyes:  Good vision, no reported pain  ENT:  No sore throat, pain, runny nose  CV:  No pain, palpitations  Pulm:  No dyspnea, cough  GI:  See HPI, otherwise negative  :  No  incontinence, nocturia  Muscle:  No pain, weakness  Neuro:  No memory problems  Psych:  No insomnia, mood problems, depression  Endocrine:  No polyuria, polydipsia, cold/heat intolerance  Heme:  No petechiae, ecchymosis, easy bruisability  Skin:  No rash    PHYSICAL EXAM:   Vital Signs:  Vital Signs Last 24 Hrs  T(C): 36.1 (19 Jun 2020 08:30), Max: 36.4 (18 Jun 2020 21:28)  T(F): 97 (19 Jun 2020 08:30), Max: 97.6 (19 Jun 2020 01:06)  HR: 80 (19 Jun 2020 08:30) (74 - 80)  BP: 105/58 (19 Jun 2020 08:30) (105/58 - 145/63)  BP(mean): --  RR: 18 (19 Jun 2020 08:30) (18 - 18)  SpO2: 98% (19 Jun 2020 08:30) (98% - 99%)  Daily     Daily     GENERAL: no acute distress  NEURO: alert, minimal asterixis, oriented x 3, able to spell WORLD backwards  HEENT: anicteric sclera, no conjunctival pallor appreciated  CHEST: no respiratory distress, no accessory muscle use  CARDIAC: regular rate, rhythm  ABDOMEN: soft, non-tender, non-distended, no rebound or guarding  EXTREMITIES: warm, well perfused, no edema  SKIN: no lesions noted    LABS: reviewed                        8.4    3.79  )-----------( 47       ( 19 Jun 2020 06:56 )             24.2     06-19    127<L>  |  99  |  36<H>  ----------------------------<  142<H>  5.1   |  20<L>  |  1.63<H>    Ca    10.5      19 Jun 2020 07:16  Phos  3.5     06-19  Mg     1.7     06-19    TPro  5.8<L>  /  Alb  4.0  /  TBili  9.1<H>  /  DBili  x   /  AST  18  /  ALT  12  /  AlkPhos  118  06-19    LIVER FUNCTIONS - ( 19 Jun 2020 07:16 )  Alb: 4.0 g/dL / Pro: 5.8 g/dL / ALK PHOS: 118 U/L / ALT: 12 U/L / AST: 18 U/L / GGT: x             Interval Diagnostic Studies: see sunrise for full report

## 2020-06-19 NOTE — PROGRESS NOTE ADULT - ATTENDING COMMENTS
63 yo M with IDDM, dyslipidemia, obesity, GERD, HFpEF with mild LV diastolic dysfunction, MGUS, and decompensated JEAN cirrhosis complicated by ascites, history of SBP, hepatic encephalopathy, and chronic anemia with a history of duodenal ulcer as well as GAVE and duodenal AVM s/p APC (last on 10/11/19), who is UNOS listed for liver transplantation at [facility]. 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 He is re-admitted with hepatic encephalopathy, recurrent UTI, and MISA on CKD.    # Complicated UTI with VRE: Receiving 7-day course of linezolid (6/15-21) as per Transplant ID. Also remains on Bactrim prophylaxis (given prior ESBL E. coli infection). Blood cultures remain negative. Afebrile and without leukocytosis.    # Right heel ulcer: No evidence of active infection, including based on repeat MRI done as well as repeated bedside evaluations by Podiatry and Transplant ID in addition to our team.    # Hepatic encephalopathy: Improved since admission, but not yet at his baseline. Continue rifaximin, Miralax, and lactulose and will titrate as needed.    # MISA on CKD: Transplant Nephrology input appreciated. Continue to hold albumin and diuretics for now.    # Hyponatremia: Worsened today. Continue fluid restriction. Not opposed to liberalizing sodium in diet and adding protein shakes, as per Transplant Nephrology recommendations.    # Orthostatic hypotension: Chronic, related to end-stage liver disease. Continue midodrine.    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 S/p 1 unit PRBCs on 6/17. Continue to trend.    # Decompensated JEAN cirrhosis: He is currently UNOS wait-listed for liver transplantation at [facility], blood type A, listed at MELD-Na 35 (6/17/20), with current MELD-Na 33 today. Anticipate starting to entertain potential liver offers for him after he completes his 7-day course of linezolid on Sunday (6/21), as per discussions with Transplant Surgery. Please continue to check MELD labs daily.    Please don't hesitate to call with any questions/concerns.    Letitia Mo M.D., Ph.D.  Transplant Hepatology  Cell: [phone]

## 2020-06-19 NOTE — PROGRESS NOTE ADULT - SUBJECTIVE AND OBJECTIVE BOX
Follow Up:      Interval History:    REVIEW OF SYSTEMS  [  ] ROS unobtainable because:    [  ] All other systems negative except as noted below    Constitutional:  [ ] fever [ ] chills  [ ] weight loss  [ ] weakness  Skin:  [ ] rash [ ] phlebitis	  Eyes: [ ] icterus [ ] pain  [ ] discharge	  ENMT: [ ] sore throat  [ ] thrush [ ] ulcers [ ] exudates  Respiratory: [ ] dyspnea [ ] hemoptysis [ ] cough [ ] sputum	  Cardiovascular:  [ ] chest pain [ ] palpitations [ ] edema	  Gastrointestinal:  [ ] nausea [ ] vomiting [ ] diarrhea [ ] constipation [ ] pain	  Genitourinary:  [ ] dysuria [ ] frequency [ ] hematuria [ ] discharge [ ] flank pain  [ ] incontinence  Musculoskeletal:  [ ] myalgias [ ] arthralgias [ ] arthritis  [ ] back pain  Neurological:  [ ] headache [ ] seizures  [ ] confusion/altered mental status    Allergies  codeine (Anaphylaxis)        ANTIMICROBIALS:  linezolid    Tablet 600 every 12 hours  nystatin    Suspension 199375 four times a day  rifAXIMin 550 two times a day  trimethoprim  160 mG/sulfamethoxazole 800 mG 1 daily      OTHER MEDS:  MEDICATIONS  (STANDING):  acetaminophen   Tablet .. 650 every 8 hours PRN  dextrose 40% Gel 15 once PRN  dextrose 50% Injectable 12.5 once  dextrose 50% Injectable 25 once  dextrose 50% Injectable 25 once  glucagon  Injectable 1 once PRN  hepatitis B Vaccine - Adult (ENGERIX-B) 20 once  insulin glargine Injectable (LANTUS) 4 at bedtime  insulin lispro (HumaLOG) corrective regimen sliding scale  three times a day before meals  insulin lispro (HumaLOG) corrective regimen sliding scale  at bedtime  insulin lispro Injectable (HumaLOG) 3 three times a day before meals  lactulose Syrup 30 every 6 hours  midodrine. 20 three times a day  ondansetron Injectable 4 every 8 hours  pantoprazole  Injectable 40 every 12 hours  polyethylene glycol 3350 17 daily  tamsulosin 0.4 at bedtime  ursodiol Capsule 300 two times a day      Vital Signs Last 24 Hrs  T(C): 36.4 (19 Jun 2020 16:26), Max: 36.4 (18 Jun 2020 21:28)  T(F): 97.6 (19 Jun 2020 16:26), Max: 97.6 (19 Jun 2020 01:06)  HR: 75 (19 Jun 2020 16:26) (75 - 81)  BP: 105/55 (19 Jun 2020 16:26) (105/55 - 145/63)  BP(mean): --  RR: 18 (19 Jun 2020 16:26) (18 - 18)  SpO2: 97% (19 Jun 2020 16:26) (97% - 99%)    PHYSICAL EXAMINATION:  General: Alert and Awake, NAD  HEENT: PERRL, EOMI  Neck: Supple  Cardiac: RRR, No M/R/G  Resp: CTAB, No Wh/Rh/Ra  Abdomen: NBS, NT/ND, No HSM, No rigidity or guarding  MSK: No LE edema. No Calf tenderness  : No bob  Skin: No rashes or lesions. Skin is warm and dry to the touch.   Neuro: Alert and Awake. CN 2-12 Grossly intact. Moves all four extremities spontaneously.  Psych: Calm, Pleasant, Cooperative                          8.4    3.79  )-----------( 47       ( 19 Jun 2020 06:56 )             24.2       06-19    127<L>  |  99  |  36<H>  ----------------------------<  142<H>  5.1   |  20<L>  |  1.63<H>    Ca    10.5      19 Jun 2020 07:16  Phos  3.5     06-19  Mg     1.7     06-19    TPro  5.8<L>  /  Alb  4.0  /  TBili  9.1<H>  /  DBili  x   /  AST  18  /  ALT  12  /  AlkPhos  118  06-19          MICROBIOLOGY:  v  .Urine Catheterized  06-14-20   >100,000 CFU/ml Enterococcus faecium (vancomycin resistant)  --  Enterococcus faecium (vancomycin resistant)      .Urine Clean Catch (Midstream)  06-14-20   >100,000 CFU/ml Enterococcus faecium (vancomycin resistant)  --  Enterococcus faecium (vancomycin resistant)      .Blood Blood-Peripheral  06-12-20   No Growth Final  --  --        CMV IgG Antibody: <0.20 U/mL (12-04-19 @ 08:33)  Toxoplasma IgG Screen: <3.0 IU/mL (12-04-19 @ 08:33)          RADIOLOGY:    <The imaging below has been reviewed and visualized by me independently. Findings as detailed in report below> Follow Up:  UTI    Interval History: afebrile. normal mental status. denies pain or drainage from his heel today     REVIEW OF SYSTEMS  [  ] ROS unobtainable because:    [  x] All other systems negative except as noted below    Constitutional:  [ ] fever [ ] chills  [ ] weight loss  [ ] weakness  Skin:  [ ] rash [ ] phlebitis	  Eyes: [ ] icterus [ ] pain  [ ] discharge	  ENMT: [ ] sore throat  [ ] thrush [ ] ulcers [ ] exudates  Respiratory: [ ] dyspnea [ ] hemoptysis [ ] cough [ ] sputum	  Cardiovascular:  [ ] chest pain [ ] palpitations [ ] edema	  Gastrointestinal:  [ ] nausea [ ] vomiting [ ] diarrhea [ ] constipation [ ] pain	  Genitourinary:  [ ] dysuria [ ] frequency [ ] hematuria [ ] discharge [ ] flank pain  [ ] incontinence  Musculoskeletal:  [ ] myalgias [ ] arthralgias [ ] arthritis  [ ] back pain  Neurological:  [ ] headache [ ] seizures  [ ] confusion/altered mental status    Allergies  codeine (Anaphylaxis)        ANTIMICROBIALS:  linezolid    Tablet 600 every 12 hours  nystatin    Suspension 395358 four times a day  rifAXIMin 550 two times a day  trimethoprim  160 mG/sulfamethoxazole 800 mG 1 daily      OTHER MEDS:  MEDICATIONS  (STANDING):  acetaminophen   Tablet .. 650 every 8 hours PRN  dextrose 40% Gel 15 once PRN  dextrose 50% Injectable 12.5 once  dextrose 50% Injectable 25 once  dextrose 50% Injectable 25 once  glucagon  Injectable 1 once PRN  hepatitis B Vaccine - Adult (ENGERIX-B) 20 once  insulin glargine Injectable (LANTUS) 4 at bedtime  insulin lispro (HumaLOG) corrective regimen sliding scale  three times a day before meals  insulin lispro (HumaLOG) corrective regimen sliding scale  at bedtime  insulin lispro Injectable (HumaLOG) 3 three times a day before meals  lactulose Syrup 30 every 6 hours  midodrine. 20 three times a day  ondansetron Injectable 4 every 8 hours  pantoprazole  Injectable 40 every 12 hours  polyethylene glycol 3350 17 daily  tamsulosin 0.4 at bedtime  ursodiol Capsule 300 two times a day      Vital Signs Last 24 Hrs  T(C): 36.4 (19 Jun 2020 16:26), Max: 36.4 (18 Jun 2020 21:28)  T(F): 97.6 (19 Jun 2020 16:26), Max: 97.6 (19 Jun 2020 01:06)  HR: 75 (19 Jun 2020 16:26) (75 - 81)  BP: 105/55 (19 Jun 2020 16:26) (105/55 - 145/63)  BP(mean): --  RR: 18 (19 Jun 2020 16:26) (18 - 18)  SpO2: 97% (19 Jun 2020 16:26) (97% - 99%)    PHYSICAL EXAMINATION:  General: Alert and Awake, oriented 3x, NAD, jaundice  HEENT: PERRL, EOMI, scleral icterus  Neck: Supple  Cardiac: RRR, No M/R/G  Resp: CTAB, No Wh/Rh/Ra  Abdomen: NBS, NT/ND, No HSM, No rigidity or guarding  MSK: R heel with eschar without surrounding drainage or underlying fluctuance. Some yellow drainage on the heel but patient had medihoney placed on the heel recently. 1+ LE edema. No Calf tenderness  : bob  Skin: No rashes or lesions. Skin is warm and dry to the touch.   Neuro: Alert and Awake. oriented 3x CN 2-12 Grossly intact. Moves all four extremities spontaneously.  Psych: Calm, Pleasant, Cooperative                        8.4    3.79  )-----------( 47       ( 19 Jun 2020 06:56 )             24.2       06-19    127<L>  |  99  |  36<H>  ----------------------------<  142<H>  5.1   |  20<L>  |  1.63<H>    Ca    10.5      19 Jun 2020 07:16  Phos  3.5     06-19  Mg     1.7     06-19    TPro  5.8<L>  /  Alb  4.0  /  TBili  9.1<H>  /  DBili  x   /  AST  18  /  ALT  12  /  AlkPhos  118  06-19          MICROBIOLOGY:  v  .Urine Catheterized  06-14-20   >100,000 CFU/ml Enterococcus faecium (vancomycin resistant)  --  Enterococcus faecium (vancomycin resistant)      .Urine Clean Catch (Midstream)  06-14-20   >100,000 CFU/ml Enterococcus faecium (vancomycin resistant)  --  Enterococcus faecium (vancomycin resistant)      .Blood Blood-Peripheral  06-12-20   No Growth Final  --  --        CMV IgG Antibody: <0.20 U/mL (12-04-19 @ 08:33)  Toxoplasma IgG Screen: <3.0 IU/mL (12-04-19 @ 08:33)          RADIOLOGY:    <The imaging below has been reviewed and visualized by me independently. Findings as detailed in report below>    EXAM:  MR ANKLE WAW IC RT                        PROCEDURE DATE:  06/18/2020    Evaluation for soft tissue infection is limited without intravenous contrast.   Heterogeneous marrow signal without an adjacent skin ulceration or fluid collection to suggest osteomyelitis.  Degenerative changes and denervation edema as above.

## 2020-06-19 NOTE — PROGRESS NOTE ADULT - ASSESSMENT
64M w/ HFpEF, decompensated JEAN cirrhosis w/ hx of ascites + SBP, GI bleed 2/2 duodenal ulcers, angioectasias, and GAVE, ESBL E. Coli prostatic abscess s/p 4 weeks IV ertapenem, hx of ESBL UTIs and VRE colonization, presenting w/ jaundice and confusion, likely 2/2 HE and infection.    Impression:  #Cirrhosis 2/2 JEAN, decompensated by ascites; listed for liver transplant  -varices: small on last EGD 12/2019, not on BB  -ascites: trace on U/S this admission  -HCC: neg on U/S this admission  -HE: present on exam, on lactulose + rifaximin at home, improving w/ lactulose and tx of infection  -MELD-Na 33 on 6/19  #Anemia w/o overt GI bleeding, Hb down to 6.5, likely due to low level hemolysis from end stage cirrhosis  #R posterior heel wound w/ overlying eschar – no signs of infections, XR neg for gas, evaluated by podiatry  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 per ID    Recommendation:  - continue to trend Hb daily, transfuse to Hb > 7, no concern for active GI bleeding at this time  - c/w lactulose titrated to 3-4 BMs per day  - c/w miralax  - c/w rifaximin  - f/u transplant ID recs re: antibiotics, agree w/ 7d of linezolid  - trend CMP, CBC, INR daily  - f/u renal recs re: albumin, continue to trend Cr  - f/u repeat urine sodium  - agree w/ protein supplementation  - continue home midodrine therapy  - low Na diet  - rest of care per primary team  - hepatology will follow    Ze Dominguez  Gastroenterology Fellow  Available via [employer] Teams  Call [phone] ([facility]) or Page [phone] ([facility]) from 8am-5pm M-F  Please page on-call GI fellow via the  at night and on weekends 64M w/ HFpEF, decompensated JEAN cirrhosis w/ hx of ascites + SBP, GI bleed 2/2 duodenal ulcers, angioectasias, and GAVE, ESBL E. Coli prostatic abscess s/p 4 weeks IV ertapenem, hx of ESBL UTIs and VRE colonization, presenting w/ jaundice and confusion, likely 2/2 HE and infection.    Impression:  #Cirrhosis 2/2 JEAN, decompensated by ascites; listed for liver transplant  -varices: small on last EGD 12/2019, not on BB  -ascites: trace on U/S this admission  -HCC: neg on U/S this admission  -HE: present on exam, on lactulose + rifaximin at home, improving w/ lactulose and tx of infection  -MELD-Na 33 on 6/19  #Anemia w/o overt GI bleeding, Hb down to 6.5, likely due to low level hemolysis from end stage cirrhosis  #R posterior heel wound w/ overlying eschar – no signs of infections, XR neg for gas, evaluated by podiatry and ID, awaiting MRI w/ IV contrast  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 per ID    Recommendation:  - follow podiatry recs re: wound care  - f/u ID recs: re MRI of heel  - continue to trend Hb daily, transfuse to Hb > 7, no concern for active GI bleeding at this time  - c/w lactulose titrated to 3-4 BMs per day  - c/w miralax  - c/w rifaximin  - f/u transplant ID recs re: antibiotics, agree w/ 7d of linezolid  - trend CMP, CBC, INR daily  - f/u renal recs re: albumin, continue to trend Cr  - f/u repeat urine sodium  - agree w/ protein supplementation  - continue home midodrine therapy  - low Na diet  - rest of care per primary team  - hepatology will follow    Ze Dominguez  Gastroenterology Fellow  Available via [employer] Teams  Call [phone] ([facility]) or Page [phone] ([facility]) from 8am-5pm M-F  Please page on-call GI fellow via the  at night and on weekends

## 2020-06-19 NOTE — PROGRESS NOTE ADULT - ATTENDING COMMENTS
updated wife Ada over the phone    Cary Rivera MD  Division of Hospital Medicine  Cell: [phone]  Pager: [phone]  Office: [phone]

## 2020-06-19 NOTE — PROGRESS NOTE ADULT - ASSESSMENT
Pt with MISA likely 2/2 pre renal and/or ATN in setting hypotension, less likely HRS (Concepcion>35, no response tx)    #Acute Kidney Injury  - MISA likely 2/2 pre renal ATN in setting hypotension, infection, decrease PO intake, less likely HRS (Concepcion>35, no response tx)  - Urine Na 112 ~ ATN, UPro/Cr 0.2 gram, UA showed UTI w/o protein/rbc.    - Baseline sCr 1.0 in 5/2020, on admission sCr 1.57 (6/11), uptrend to 1.75 on 6/13. Scr stable today 1.63mg/dL  - Borderline hypotension during hospitalization (on midodrine), no response to albumin/midodrine treated, less likely HRS given Concepcion>35. Good UOP during hospitalization, lasix/aldactone held.    - Prior MISA w/u Dec 2019 showed FLC elevated K/L ratio 2.91 (K>L) and immunofixation showing "one Weak IgA Lambda  Band and One Weak IgG Lambda Band Identified"  - C/w holding diuretics for now, hold further albumin for now  - No absolute indication for dialysis, no fluid overload no refractory electrolyte derangement   - Avoid nephrotoxin meds such as NSAIDS, PPI, ACEI/ARB, and contrast agents.  - Renally dose medications per GFR.    #Hyponatremia  - Na 127, down from 130  - Would fluid restrict 1L, encourage PO intake with protein, can add protein shakes if ok from hepatology standpoint, liberalize salt intake  - Hold diuretics for now  - Repeat urine studies  - monitor BMP's BID      Abelino Patterson   Nephrology Fellow  Pager NS: [phone]/ [facility]: [phone]  (After 5 pm or on weekends please page the on-call fellow)

## 2020-06-20 LAB
ALBUMIN SERPL ELPH-MCNC: 3.9 G/DL — SIGNIFICANT CHANGE UP (ref 3.3–5)
ALP SERPL-CCNC: 125 U/L — HIGH (ref 40–120)
ALT FLD-CCNC: 10 U/L — SIGNIFICANT CHANGE UP (ref 10–45)
ANION GAP SERPL CALC-SCNC: 11 MMOL/L — SIGNIFICANT CHANGE UP (ref 5–17)
AST SERPL-CCNC: 18 U/L — SIGNIFICANT CHANGE UP (ref 10–40)
BILIRUB DIRECT SERPL-MCNC: 2 MG/DL — HIGH (ref 0–0.2)
BILIRUB INDIRECT FLD-MCNC: 6.6 MG/DL — HIGH (ref 0.2–1)
BILIRUB SERPL-MCNC: 8.6 MG/DL — HIGH (ref 0.2–1.2)
BUN SERPL-MCNC: 39 MG/DL — HIGH (ref 7–23)
CALCIUM SERPL-MCNC: 10.3 MG/DL — SIGNIFICANT CHANGE UP (ref 8.4–10.5)
CHLORIDE SERPL-SCNC: 98 MMOL/L — SIGNIFICANT CHANGE UP (ref 96–108)
CO2 SERPL-SCNC: 19 MMOL/L — LOW (ref 22–31)
CREAT SERPL-MCNC: 1.84 MG/DL — HIGH (ref 0.5–1.3)
GLUCOSE SERPL-MCNC: 170 MG/DL — HIGH (ref 70–99)
HCT VFR BLD CALC: 22.5 % — LOW (ref 39–50)
HGB BLD-MCNC: 7.7 G/DL — LOW (ref 13–17)
INR BLD: 2.6 RATIO — HIGH (ref 0.88–1.16)
MCHC RBC-ENTMCNC: 34.2 GM/DL — SIGNIFICANT CHANGE UP (ref 32–36)
MCHC RBC-ENTMCNC: 35.3 PG — HIGH (ref 27–34)
MCV RBC AUTO: 103.2 FL — HIGH (ref 80–100)
NRBC # BLD: 0 /100 WBCS — SIGNIFICANT CHANGE UP (ref 0–0)
PLATELET # BLD AUTO: 46 K/UL — LOW (ref 150–400)
POTASSIUM SERPL-MCNC: 5.4 MMOL/L — HIGH (ref 3.5–5.3)
POTASSIUM SERPL-SCNC: 5.4 MMOL/L — HIGH (ref 3.5–5.3)
PROT SERPL-MCNC: 5.4 G/DL — LOW (ref 6–8.3)
PROTHROM AB SERPL-ACNC: 30.8 SEC — HIGH (ref 10–12.9)
RBC # BLD: 2.18 M/UL — LOW (ref 4.2–5.8)
RBC # FLD: 19.8 % — HIGH (ref 10.3–14.5)
SODIUM SERPL-SCNC: 128 MMOL/L — LOW (ref 135–145)
WBC # BLD: 3.31 K/UL — LOW (ref 3.8–10.5)
WBC # FLD AUTO: 3.31 K/UL — LOW (ref 3.8–10.5)

## 2020-06-20 PROCEDURE — 99232 SBSQ HOSP IP/OBS MODERATE 35: CPT

## 2020-06-20 PROCEDURE — 99232 SBSQ HOSP IP/OBS MODERATE 35: CPT | Mod: GC

## 2020-06-20 RX ADMIN — Medication 500000 UNIT(S): at 11:53

## 2020-06-20 RX ADMIN — ONDANSETRON 4 MILLIGRAM(S): 8 TABLET, FILM COATED ORAL at 21:50

## 2020-06-20 RX ADMIN — URSODIOL 300 MILLIGRAM(S): 250 TABLET, FILM COATED ORAL at 06:02

## 2020-06-20 RX ADMIN — MIDODRINE HYDROCHLORIDE 20 MILLIGRAM(S): 2.5 TABLET ORAL at 17:42

## 2020-06-20 RX ADMIN — Medication 500000 UNIT(S): at 17:42

## 2020-06-20 RX ADMIN — Medication 3 UNIT(S): at 09:24

## 2020-06-20 RX ADMIN — MIDODRINE HYDROCHLORIDE 20 MILLIGRAM(S): 2.5 TABLET ORAL at 11:53

## 2020-06-20 RX ADMIN — INSULIN GLARGINE 4 UNIT(S): 100 INJECTION, SOLUTION SUBCUTANEOUS at 21:49

## 2020-06-20 RX ADMIN — Medication 1: at 09:23

## 2020-06-20 RX ADMIN — Medication 1 APPLICATION(S): at 17:42

## 2020-06-20 RX ADMIN — Medication 3 UNIT(S): at 14:10

## 2020-06-20 RX ADMIN — LINEZOLID 600 MILLIGRAM(S): 600 INJECTION, SOLUTION INTRAVENOUS at 17:42

## 2020-06-20 RX ADMIN — Medication 500000 UNIT(S): at 06:02

## 2020-06-20 RX ADMIN — NYSTATIN CREAM 1 APPLICATION(S): 100000 CREAM TOPICAL at 06:15

## 2020-06-20 RX ADMIN — ONDANSETRON 4 MILLIGRAM(S): 8 TABLET, FILM COATED ORAL at 06:02

## 2020-06-20 RX ADMIN — NYSTATIN CREAM 1 APPLICATION(S): 100000 CREAM TOPICAL at 17:41

## 2020-06-20 RX ADMIN — MIDODRINE HYDROCHLORIDE 20 MILLIGRAM(S): 2.5 TABLET ORAL at 06:02

## 2020-06-20 RX ADMIN — LINEZOLID 600 MILLIGRAM(S): 600 INJECTION, SOLUTION INTRAVENOUS at 06:02

## 2020-06-20 RX ADMIN — Medication 1 TABLET(S): at 11:54

## 2020-06-20 RX ADMIN — Medication 3 UNIT(S): at 18:25

## 2020-06-20 RX ADMIN — Medication 2: at 18:24

## 2020-06-20 RX ADMIN — Medication 1: at 14:09

## 2020-06-20 RX ADMIN — Medication 1 APPLICATION(S): at 06:10

## 2020-06-20 RX ADMIN — LACTULOSE 30 GRAM(S): 10 SOLUTION ORAL at 06:02

## 2020-06-20 RX ADMIN — Medication 1 MILLIGRAM(S): at 11:53

## 2020-06-20 RX ADMIN — PANTOPRAZOLE SODIUM 40 MILLIGRAM(S): 20 TABLET, DELAYED RELEASE ORAL at 17:42

## 2020-06-20 RX ADMIN — PANTOPRAZOLE SODIUM 40 MILLIGRAM(S): 20 TABLET, DELAYED RELEASE ORAL at 06:02

## 2020-06-20 RX ADMIN — TAMSULOSIN HYDROCHLORIDE 0.4 MILLIGRAM(S): 0.4 CAPSULE ORAL at 21:50

## 2020-06-20 RX ADMIN — LACTULOSE 30 GRAM(S): 10 SOLUTION ORAL at 11:53

## 2020-06-20 NOTE — PROGRESS NOTE ADULT - ATTENDING COMMENTS
63 yo M with IDDM, dyslipidemia, obesity, GERD, HFpEF with mild LV diastolic dysfunction, MGUS, and decompensated JEAN cirrhosis complicated by ascites, history of SBP, hepatic encephalopathy, and chronic anemia with a history of duodenal ulcer as well as GAVE and duodenal AVM s/p APC (last on 10/11/19), who is UNOS listed for liver transplantation at [facility]. 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 He is re-admitted with hepatic encephalopathy, recurrent UTI, and MISA on CKD.    # Complicated UTI with VRE: Receiving 7-day course of linezolid (6/15-21) as per Transplant ID. Also remains on Bactrim prophylaxis (given prior ESBL E. coli infection). Blood cultures remain negative. Afebrile and without leukocytosis.    # Right heel ulcer: No evidence of active infection, including based on repeat MRI done as well as repeated bedside evaluations by Podiatry and Transplant ID in addition to our team.    # Hepatic encephalopathy: Improved since admission, but not yet at his baseline, still with mild asterixis today. Continue rifaximin, Miralax, and lactulose and will titrate as needed.    # MISA on CKD: Transplant Nephrology input appreciated. Continue to hold albumin and diuretics for now.    # Hyponatremia: Continue fluid restriction. Not opposed to adding Glucerna protein shakes, as per Transplant Nephrology recommendations.    # Orthostatic hypotension: Chronic, related to end-stage liver disease. Continue midodrine.    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 S/p 1 unit PRBCs on 6/17. Continue to trend.    # Decompensated JEAN cirrhosis: He is currently UNOS wait-listed for liver transplantation at [facility], blood type A, listed at MELD-Na 35 (6/17/20), with current MELD-Na 34 today. Anticipate starting to entertain potential liver offers for him after he completes his 7-day course of linezolid on Sunday (6/21), as per discussions with Transplant Surgery. Please continue to check MELD labs daily.    Please don't hesitate to call with any questions/concerns.    Letitia Mo M.D., Ph.D.  Transplant Hepatology  Cell: [phone]

## 2020-06-20 NOTE — PROGRESS NOTE ADULT - PROBLEM SELECTOR PLAN 2
No symptoms, possible chronic colonization. Has been on Meropenem  - as per Dr. Pérez course of linezolid for VRE UTI for total 7 days (6/15-6/21)  - UCx with VRE  - CTAP on this admission without prostate abscess

## 2020-06-20 NOTE — PROGRESS NOTE ADULT - ASSESSMENT
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 now admitted with hepatic encephalopathy, found to have VRE UTI, also with MISA on CKD.    JEAN Cirrhosis/Hepatic encephalopathy/VRE UTI  - VRE UTI, colonization. On 7-day course of linezolid (6/15-21). Blood cultures negative. Continue Bactrim for UTI PPx (previous recurrent ESBL E coli infection)   - HE -continue rifaximin, Miralax, and lactulose   - Right heel wound- no signs of infections. XR neg for gas/OM; Foot MRI neg for OM.  - UNOS waitlisted for Liver Transplant.  MELD 34 today    Blood type A negative   - Daily CBC, CMP, INR  - Transplant surgery will continue to follow closely    Please page [phone] with any questions

## 2020-06-20 NOTE — PROGRESS NOTE ADULT - SUBJECTIVE AND OBJECTIVE BOX
Chief Complaint:  Patient is a 64y old  Male who presents with a chief complaint of Liver failure, hepatic encephalopathy (20 Jun 2020 12:31)    Reason for consult: cirrhosis, HE    Interval Events: Mental status improving. No e/o infection in foot. Urine studies c/w SIADH. MELDNa 34 today.     Hospital Medications:  acetaminophen   Tablet .. 650 milliGRAM(s) Oral every 8 hours PRN  dextrose 40% Gel 15 Gram(s) Oral once PRN  dextrose 5%. 1000 milliLiter(s) IV Continuous <Continuous>  dextrose 50% Injectable 12.5 Gram(s) IV Push once  dextrose 50% Injectable 25 Gram(s) IV Push once  dextrose 50% Injectable 25 Gram(s) IV Push once  folic acid 1 milliGRAM(s) Oral daily  glucagon  Injectable 1 milliGRAM(s) IntraMuscular once PRN  hepatitis B Vaccine - Adult (ENGERIX-B) 20 MICROGram(s) IntraMuscular once  insulin glargine Injectable (LANTUS) 4 Unit(s) SubCutaneous at bedtime  insulin lispro (HumaLOG) corrective regimen sliding scale   SubCutaneous three times a day before meals  insulin lispro (HumaLOG) corrective regimen sliding scale   SubCutaneous at bedtime  insulin lispro Injectable (HumaLOG) 3 Unit(s) SubCutaneous three times a day before meals  lactulose Syrup 30 Gram(s) Oral every 6 hours  linezolid    Tablet 600 milliGRAM(s) Oral every 12 hours  midodrine. 20 milliGRAM(s) Oral three times a day  nystatin    Suspension 109886 Unit(s) Oral four times a day  nystatin Cream 1 Application(s) Topical two times a day  ondansetron Injectable 4 milliGRAM(s) IV Push every 8 hours  pantoprazole  Injectable 40 milliGRAM(s) IV Push every 12 hours  polyethylene glycol 3350 17 Gram(s) Oral daily  rifAXIMin 550 milliGRAM(s) Oral two times a day  silver sulfADIAZINE 1% Cream 1 Application(s) Topical two times a day  tamsulosin 0.4 milliGRAM(s) Oral at bedtime  trimethoprim  160 mG/sulfamethoxazole 800 mG 1 Tablet(s) Oral daily  ursodiol Capsule 300 milliGRAM(s) Oral two times a day      ROS:   General:  No  fevers, chills, night sweats, fatigue  Eyes:  Good vision, no reported pain  ENT:  No sore throat, pain, runny nose  CV:  No pain, palpitations  Pulm:  No dyspnea, cough  GI:  See HPI, otherwise negative  :  No  incontinence, nocturia  Muscle:  No pain, weakness  Neuro:  No memory problems  Psych:  No insomnia, mood problems, depression  Endocrine:  No polyuria, polydipsia, cold/heat intolerance  Heme:  No petechiae, ecchymosis, easy bruisability  Skin:  No rash    PHYSICAL EXAM:   Vital Signs:  Vital Signs Last 24 Hrs  T(C): 36.4 (20 Jun 2020 12:20), Max: 36.6 (20 Jun 2020 05:05)  T(F): 97.5 (20 Jun 2020 12:20), Max: 97.8 (20 Jun 2020 05:05)  HR: 78 (20 Jun 2020 12:20) (75 - 80)  BP: 110/58 (20 Jun 2020 12:20) (103/56 - 114/58)  BP(mean): --  RR: 18 (20 Jun 2020 12:20) (18 - 20)  SpO2: 97% (20 Jun 2020 12:20) (96% - 98%)  Daily     Daily     GENERAL: no acute distress  NEURO: alert, oriented x 3, able to spell WORLD backwards, mild asterixis  HEENT: anicteric sclera, no conjunctival pallor appreciated  CHEST: no respiratory distress, no accessory muscle use  CARDIAC: regular rate, rhythm  ABDOMEN: soft, non-tender, non-distended, no rebound or guarding  EXTREMITIES: warm, well perfused, no edema, R heel wound w/ minimal serosanguinous drainage, non-tender  SKIN: no lesions noted    LABS: reviewed                        7.7    3.31  )-----------( 46       ( 20 Jun 2020 07:09 )             22.5     06-20    128<L>  |  98  |  39<H>  ----------------------------<  170<H>  5.4<H>   |  19<L>  |  1.84<H>    Ca    10.3      20 Jun 2020 07:09  Phos  3.5     06-19  Mg     1.7     06-19    TPro  5.4<L>  /  Alb  3.9  /  TBili  8.6<H>  /  DBili  2.0<H>  /  AST  18  /  ALT  10  /  AlkPhos  125<H>  06-20    LIVER FUNCTIONS - ( 20 Jun 2020 09:19 )  Alb: 3.9 g/dL / Pro: 5.4 g/dL / ALK PHOS: 125 U/L / ALT: 10 U/L / AST: 18 U/L / GGT: x             Interval Diagnostic Studies: see sunrise for full report Chief Complaint:  Patient is a 64y old  Male who presents with a chief complaint of Liver failure, hepatic encephalopathy (20 Jun 2020 12:31)    Reason for consult: cirrhosis, HE    Interval Events: Mental status improving. No e/o infection in foot. Urine studies c/w SIADH. MELDNa 34 today.     Hospital Medications:  acetaminophen   Tablet .. 650 milliGRAM(s) Oral every 8 hours PRN  dextrose 40% Gel 15 Gram(s) Oral once PRN  dextrose 5%. 1000 milliLiter(s) IV Continuous <Continuous>  dextrose 50% Injectable 12.5 Gram(s) IV Push once  dextrose 50% Injectable 25 Gram(s) IV Push once  dextrose 50% Injectable 25 Gram(s) IV Push once  folic acid 1 milliGRAM(s) Oral daily  glucagon  Injectable 1 milliGRAM(s) IntraMuscular once PRN  hepatitis B Vaccine - Adult (ENGERIX-B) 20 MICROGram(s) IntraMuscular once  insulin glargine Injectable (LANTUS) 4 Unit(s) SubCutaneous at bedtime  insulin lispro (HumaLOG) corrective regimen sliding scale   SubCutaneous three times a day before meals  insulin lispro (HumaLOG) corrective regimen sliding scale   SubCutaneous at bedtime  insulin lispro Injectable (HumaLOG) 3 Unit(s) SubCutaneous three times a day before meals  lactulose Syrup 30 Gram(s) Oral every 6 hours  linezolid    Tablet 600 milliGRAM(s) Oral every 12 hours  midodrine. 20 milliGRAM(s) Oral three times a day  nystatin    Suspension 560091 Unit(s) Oral four times a day  nystatin Cream 1 Application(s) Topical two times a day  ondansetron Injectable 4 milliGRAM(s) IV Push every 8 hours  pantoprazole  Injectable 40 milliGRAM(s) IV Push every 12 hours  polyethylene glycol 3350 17 Gram(s) Oral daily  rifAXIMin 550 milliGRAM(s) Oral two times a day  silver sulfADIAZINE 1% Cream 1 Application(s) Topical two times a day  tamsulosin 0.4 milliGRAM(s) Oral at bedtime  trimethoprim  160 mG/sulfamethoxazole 800 mG 1 Tablet(s) Oral daily  ursodiol Capsule 300 milliGRAM(s) Oral two times a day      ROS:   General:  No  fevers, chills, night sweats, fatigue  Eyes:  Good vision, no reported pain  ENT:  No sore throat, pain, runny nose  CV:  No pain, palpitations  Pulm:  No dyspnea, cough  GI:  See HPI, otherwise negative  :  No  incontinence, nocturia  Muscle:  No pain, weakness  Neuro:  No memory problems  Psych:  No insomnia, mood problems, depression  Endocrine:  No polyuria, polydipsia, cold/heat intolerance  Heme:  No petechiae, ecchymosis, easy bruisability  Skin:  No rash    PHYSICAL EXAM:   Vital Signs:  Vital Signs Last 24 Hrs  T(C): 36.4 (20 Jun 2020 12:20), Max: 36.6 (20 Jun 2020 05:05)  T(F): 97.5 (20 Jun 2020 12:20), Max: 97.8 (20 Jun 2020 05:05)  HR: 78 (20 Jun 2020 12:20) (75 - 80)  BP: 110/58 (20 Jun 2020 12:20) (103/56 - 114/58)  BP(mean): --  RR: 18 (20 Jun 2020 12:20) (18 - 20)  SpO2: 97% (20 Jun 2020 12:20) (96% - 98%)  Daily     Daily     GENERAL: no acute distress  NEURO: alert, oriented x 3, able to spell WORLD backwards, mild asterixis  HEENT: anicteric sclera, no conjunctival pallor appreciated  CHEST: no respiratory distress, no accessory muscle use  CARDIAC: regular rate, rhythm  ABDOMEN: soft, non-tender, non-distended, no rebound or guarding  EXTREMITIES: warm, well perfused, no edema, R heel wound w/ scant serous drainage, non-tender, no erythema, no fluctuance, no purulence  SKIN: no lesions noted    LABS: reviewed                        7.7    3.31  )-----------( 46       ( 20 Jun 2020 07:09 )             22.5     06-20    128<L>  |  98  |  39<H>  ----------------------------<  170<H>  5.4<H>   |  19<L>  |  1.84<H>    Ca    10.3      20 Jun 2020 07:09  Phos  3.5     06-19  Mg     1.7     06-19    TPro  5.4<L>  /  Alb  3.9  /  TBili  8.6<H>  /  DBili  2.0<H>  /  AST  18  /  ALT  10  /  AlkPhos  125<H>  06-20    LIVER FUNCTIONS - ( 20 Jun 2020 09:19 )  Alb: 3.9 g/dL / Pro: 5.4 g/dL / ALK PHOS: 125 U/L / ALT: 10 U/L / AST: 18 U/L / GGT: x             Interval Diagnostic Studies: see sunrise for full report

## 2020-06-20 NOTE — PROGRESS NOTE ADULT - PROBLEM SELECTOR PLAN 3
Reported confusion and altered behavior at home  - mental status has been fluctuating.     - AXR negative for illeus/obstruction  - continue lactulose 30 q6hr, added miralax  - continue rifaximin 550 BID  - monitor mental status closely

## 2020-06-20 NOTE — PROGRESS NOTE ADULT - SUBJECTIVE AND OBJECTIVE BOX
Transplant Surgery    Present:   Patient seen and examined with multidisciplinary team including Transplant Surgeon: Dr. Ackerman, hepatologist Dr. Mo, OLY Alvarenga in AM rounds with Dr. Ackerman. Disciplines not in attendance will be notified of the plan.     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 He is now re-admitted with hepatic encephalopathy and VRE UTI being treated w/ linezolid. Also admitted with MISA on CKD.  MELD 32    Blood type A negative    Interval Events:   -AOX2-3, vitals stable  - MELD 34 today.   -Worsening Cr, INR, and Na, Tbili improved.   -On linezolid for VRE UTI per transplant ID recs, started on 6/15, ends tomorrow (6/21)   -On Bactrim for UTI PPx (previous recurrent ESBL E coli infection)   -R heel w/ ulcer; no evidence of infection, evaluated by podiatry, XR and MRI of foot both neg for OM.    Plan of care:  See Below    MEDICATIONS  (STANDING):  dextrose 5%. 1000 milliLiter(s) (50 mL/Hr) IV Continuous <Continuous>  dextrose 50% Injectable 12.5 Gram(s) IV Push once  dextrose 50% Injectable 25 Gram(s) IV Push once  dextrose 50% Injectable 25 Gram(s) IV Push once  folic acid 1 milliGRAM(s) Oral daily  hepatitis B Vaccine - Adult (ENGERIX-B) 20 MICROGram(s) IntraMuscular once  insulin glargine Injectable (LANTUS) 4 Unit(s) SubCutaneous at bedtime  insulin lispro (HumaLOG) corrective regimen sliding scale   SubCutaneous three times a day before meals  insulin lispro (HumaLOG) corrective regimen sliding scale   SubCutaneous at bedtime  insulin lispro Injectable (HumaLOG) 3 Unit(s) SubCutaneous three times a day before meals  lactulose Syrup 30 Gram(s) Oral every 6 hours  linezolid    Tablet 600 milliGRAM(s) Oral every 12 hours  midodrine. 20 milliGRAM(s) Oral three times a day  nystatin    Suspension 969166 Unit(s) Oral four times a day  nystatin Cream 1 Application(s) Topical two times a day  ondansetron Injectable 4 milliGRAM(s) IV Push every 8 hours  pantoprazole  Injectable 40 milliGRAM(s) IV Push every 12 hours  polyethylene glycol 3350 17 Gram(s) Oral daily  rifAXIMin 550 milliGRAM(s) Oral two times a day  silver sulfADIAZINE 1% Cream 1 Application(s) Topical two times a day  tamsulosin 0.4 milliGRAM(s) Oral at bedtime  trimethoprim  160 mG/sulfamethoxazole 800 mG 1 Tablet(s) Oral daily  ursodiol Capsule 300 milliGRAM(s) Oral two times a day    MEDICATIONS  (PRN):  acetaminophen   Tablet .. 650 milliGRAM(s) Oral every 8 hours PRN Moderate Pain (4 - 6)  dextrose 40% Gel 15 Gram(s) Oral once PRN Blood Glucose LESS THAN 70 milliGRAM(s)/deciliter  glucagon  Injectable 1 milliGRAM(s) IntraMuscular once PRN Glucose LESS THAN 70 milligrams/deciliter      PAST MEDICAL & SURGICAL HISTORY:  GIB (gastrointestinal bleeding)  GERD with esophagitis: Gastritis &amp; Non Bleeding Ulcers  Hepatic encephalopathy  Obesity  Fatty liver disease, nonalcoholic  Renal stones: 25 years ago  Hypertension  Neuropathy  Hypercholesteremia  Diabetes  S/P cholecystectomy      Vital Signs Last 24 Hrs  T(C): 36.4 (20 Jun 2020 08:24), Max: 36.6 (20 Jun 2020 05:05)  T(F): 97.6 (20 Jun 2020 08:24), Max: 97.8 (20 Jun 2020 05:05)  HR: 80 (20 Jun 2020 08:24) (75 - 80)  BP: 103/56 (20 Jun 2020 08:24) (103/56 - 114/58)  BP(mean): --  RR: 18 (20 Jun 2020 08:24) (18 - 20)  SpO2: 98% (20 Jun 2020 08:24) (96% - 98%)    I&O's Summary    19 Jun 2020 07:01  -  20 Jun 2020 07:00  --------------------------------------------------------  IN: 0 mL / OUT: 1081 mL / NET: -1081 mL                          7.7    3.31  )-----------( 46       ( 20 Jun 2020 07:09 )             22.5     06-20    128<L>  |  98  |  39<H>  ----------------------------<  170<H>  5.4<H>   |  19<L>  |  1.84<H>    Ca    10.3      20 Jun 2020 07:09  Phos  3.5     06-19  Mg     1.7     06-19    TPro  5.4<L>  /  Alb  3.9  /  TBili  8.6<H>  /  DBili  2.0<H>  /  AST  18  /  ALT  10  /  AlkPhos  125<H>  06-20    Review of systems  Gen: No weight changes, fatigue, fevers/chills, weakness  Skin: Jaundice, +R heel ulcer  Head/Eyes/Ears/Mouth: No headache; Normal hearing; Normal vision w/o blurriness; No sinus pain/discomfort.    Respiratory: No dyspnea, cough, wheezing, hemoptysis  CV: No chest pain, PND, orthopnea  GI: denies diarrhea, constipation, nausea, vomiting, melena, hematochezia  : No increased frequency, dysuria, hematuria, nocturia  MSK: No joint pain/swelling; no back pain; no edema  Neuro: No dizziness/lightheadedness, weakness, seizures, numbness, tingling  Heme: No easy bruising or bleeding  Endo: No heat/cold intolerance  Psych: No significant nervousness, anxiety, stress, depression  All other systems were reviewed and are negative, except as noted.        PHYSICAL EXAM:  Constitutional: Well developed / well nourished  Eyes: Anicteric, PERRLA  ENMT: nc/at  Neck: supple  Respiratory: CTA B/L  Cardiovascular: RRR  Abdominal:  soft, NT, ND  Genitourinary: Voiding spontaneously  Extremities: R heel wound w/ overlying eschar w/ no signs of infection.   Vascular: Palpable dp pulses bilaterally  Neurological: A&O x2-3  Skin: no rashes, ulcerations or lesions   Musculoskeletal: Moving all extremities  Psychiatric: Responsive

## 2020-06-20 NOTE — PROGRESS NOTE ADULT - SUBJECTIVE AND OBJECTIVE BOX
INTERVAL HPI/OVERNIGHT EVENTS:  No new overnight event.  No N/V/D.  Tolerating diet.      Allergies    codeine (Anaphylaxis)    Intolerances    General:  No wt loss, fevers, chills, night sweats, fatigue,   Eyes:  Good vision, no reported pain  ENT:  No sore throat, pain, runny nose, dysphagia  CV:  No pain, palpitations, hypo/hypertension  Resp:  No dyspnea, cough, tachypnea, wheezing  GI:  No pain, No nausea, No vomiting, No diarrhea, No constipation, No weight loss, No fever, No pruritis, No rectal bleeding, No tarry stools, No dysphagia,  :  No pain, bleeding, incontinence, nocturia  Muscle:  No pain, weakness  Neuro:  No weakness, tingling, memory problems  Psych:  No fatigue, insomnia, mood problems, depression  Endocrine:  No polyuria, polydipsia, cold/heat intolerance  Heme:  No petechiae, ecchymosis, easy bruisability  Skin:  No rash, tattoos, scars, edema    PHYSICAL EXAM:   Vital Signs:  Vital Signs Last 24 Hrs  T(C): 36.7 (14 Jun 2020 07:53), Max: 36.7 (14 Jun 2020 07:53)  T(F): 98.1 (14 Jun 2020 07:53), Max: 98.1 (14 Jun 2020 07:53)  HR: 79 (14 Jun 2020 07:53) (75 - 80)  BP: 127/77 (14 Jun 2020 07:53) (108/53 - 133/68)  BP(mean): --  RR: 18 (14 Jun 2020 07:53) (17 - 18)  SpO2: 97% (14 Jun 2020 07:53) (97% - 100%)  Daily     Daily I&O's Summary    13 Jun 2020 07:01  -  14 Jun 2020 07:00  --------------------------------------------------------  IN: 350 mL / OUT: 250 mL / NET: 100 mL    14 Jun 2020 07:01  -  14 Jun 2020 12:30  --------------------------------------------------------  IN: 0 mL / OUT: 350 mL / NET: -350 mL        GENERAL:  Appears stated age, well-groomed, well-nourished, no distress  HEENT:  NC/AT,  conjunctivae clear and pink, no thyromegaly, nodules, adenopathy, no JVD, sclera -anicteric  CHEST:  Full & symmetric excursion, no increased effort, breath sounds clear  HEART:  Regular rhythm, S1, S2, no murmur/rub/S3/S4, no abdominal bruit, no edema  ABDOMEN:  Soft, non-tender, non-distended, normoactive bowel sounds,  no masses ,no hepato-splenomegaly, no signs of chronic liver disease  EXTEREMITIES:  no cyanosis,clubbing or edema  SKIN:  No rash/erythema/ecchymoses/petechiae/wounds/abscess/warm/dry  NEURO:  Alert, oriented, no asterixis, no tremor, no encephalopathy      LABS:                        9.0    5.07  )-----------( 68       ( 14 Jun 2020 07:17 )             26.4     06-14    131<L>  |  100  |  27<H>  ----------------------------<  141<H>  5.1   |  20<L>  |  1.74<H>    Ca    10.5      14 Jun 2020 07:17    TPro  6.3  /  Alb  3.8  /  TBili  11.0<H>  /  DBili  x   /  AST  24  /  ALT  15  /  AlkPhos  133<H>  06-14    PT/INR - ( 14 Jun 2020 09:19 )   PT: 26.3 sec;   INR: 2.23 ratio         PTT - ( 14 Jun 2020 09:19 )  PTT:44.2 sec    amylase   lipase  RADIOLOGY & ADDITIONAL TESTS:

## 2020-06-20 NOTE — PROGRESS NOTE ADULT - ASSESSMENT
64M w/ HFpEF, decompensated JEAN cirrhosis w/ hx of ascites + SBP, GI bleed 2/2 duodenal ulcers, angioectasias, and GAVE, ESBL E. Coli prostatic abscess s/p 4 weeks IV ertapenem, hx of ESBL UTIs and VRE colonization, presenting w/ jaundice and confusion, likely 2/2 HE and infection.    Impression:  #Cirrhosis 2/2 JEAN, decompensated by ascites; listed for liver transplant  -varices: small on last EGD 12/2019, not on BB  -ascites: trace on U/S this admission  -HCC: neg on U/S this admission  -HE: present on exam, on lactulose + rifaximin at home, improving w/ lactulose and tx of infection  -MELD-Na 34 on 6/20; listed at 35 from 6/18  #Anemia w/o overt GI bleeding, Hb down to 6.5, likely due to low level hemolysis from end stage cirrhosis  #R posterior heel wound w/ overlying eschar – no signs of infections, XR neg for gas, evaluated by podiatry and ID, awaiting MRI w/ IV contrast  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 per ID    Recommendation:  - continue to trend Hb daily, transfuse to Hb > 7, no concern for active GI bleeding at this time  - c/w lactulose titrated to 3-4 BMs per day  - c/w miralax  - c/w rifaximin  - f/u transplant ID recs re: antibiotics, agree w/ 7d of linezolid ending 6/21)  - trend CMP, CBC, INR daily  - f/u renal recs re: SIADH, recommend 1.5L fluid restriction and protein supplementation for now  - continue home midodrine therapy  - low Na diet  - rest of care per primary team  - hepatology will follow    Ze Dominguez  Gastroenterology Fellow  Available via [employer] Teams  Call [phone] ([facility]) or Page [phone] ([facility]) from 8am-5pm M-F  Please page on-call GI fellow via the  at night and on weekends

## 2020-06-20 NOTE — PROGRESS NOTE ADULT - SUBJECTIVE AND OBJECTIVE BOX
Patient is a 64y old  Male who presents with a chief complaint of Liver failure, hepatic encephalopathy (19 Jun 2020 09:50)      SUBJECTIVE / OVERNIGHT EVENTS:  no acute events overnight, vss, afebrile      ROS:  14 point ROS negative in detail except stated as above      T(C): 36.8 (06-20-20 @ 20:18), Max: 36.8 (06-20-20 @ 20:18)  HR: 87 (06-20-20 @ 20:18) (78 - 87)  BP: 117/63 (06-20-20 @ 20:18) (110/58 - 117/63)  RR: 18 (06-20-20 @ 20:18) (18 - 18)  SpO2: 97% (06-20-20 @ 20:18) (95% - 97%)      MEDICATIONS  (STANDING):  dextrose 5%. 1000 milliLiter(s) (50 mL/Hr) IV Continuous <Continuous>  dextrose 50% Injectable 12.5 Gram(s) IV Push once  dextrose 50% Injectable 25 Gram(s) IV Push once  dextrose 50% Injectable 25 Gram(s) IV Push once  folic acid 1 milliGRAM(s) Oral daily  hepatitis B Vaccine - Adult (ENGERIX-B) 20 MICROGram(s) IntraMuscular once  insulin glargine Injectable (LANTUS) 4 Unit(s) SubCutaneous at bedtime  insulin lispro (HumaLOG) corrective regimen sliding scale   SubCutaneous three times a day before meals  insulin lispro (HumaLOG) corrective regimen sliding scale   SubCutaneous at bedtime  insulin lispro Injectable (HumaLOG) 3 Unit(s) SubCutaneous three times a day before meals  lactulose Syrup 30 Gram(s) Oral every 6 hours  linezolid    Tablet 600 milliGRAM(s) Oral every 12 hours  midodrine. 20 milliGRAM(s) Oral three times a day  nystatin    Suspension 089827 Unit(s) Oral four times a day  nystatin Cream 1 Application(s) Topical two times a day  ondansetron Injectable 4 milliGRAM(s) IV Push every 8 hours  pantoprazole  Injectable 40 milliGRAM(s) IV Push every 12 hours  polyethylene glycol 3350 17 Gram(s) Oral daily  rifAXIMin 550 milliGRAM(s) Oral two times a day  silver sulfADIAZINE 1% Cream 1 Application(s) Topical two times a day  tamsulosin 0.4 milliGRAM(s) Oral at bedtime  trimethoprim  160 mG/sulfamethoxazole 800 mG 1 Tablet(s) Oral daily    MEDICATIONS  (PRN):  acetaminophen   Tablet .. 650 milliGRAM(s) Oral every 8 hours PRN Moderate Pain (4 - 6)  dextrose 40% Gel 15 Gram(s) Oral once PRN Blood Glucose LESS THAN 70 milliGRAM(s)/deciliter  glucagon  Injectable 1 milliGRAM(s) IntraMuscular once PRN Glucose LESS THAN 70 milligrams/deciliter      GENERAL: NAD, well-developed  HEAD:  Atraumatic, Normocephalic  EYES: EOMI, conjunctiva and sclera clear  NECK: Supple, No JVD  CHEST/LUNG: Clear to auscultation bilaterally; No wheeze  HEART: Regular rate and rhythm; No murmurs, rubs, or gallops  ABDOMEN: Soft, Nontender, Nondistended; Bowel sounds present  EXTREMITIES: R heel ulcer - cdi';  2+ Peripheral Pulses, No clubbing, cyanosis, or edema  NEUROLOGY: AAOx2; non-focal  SKIN: No rashes or lesions    LABS:  personally reviewed                                           7.7    3.31  )-----------( 46       ( 20 Jun 2020 07:09 )             22.5           LIVER FUNCTIONS - ( 20 Jun 2020 09:19 )  Alb: 3.9 g/dL / Pro: 5.4 g/dL / ALK PHOS: 125 U/L / ALT: 10 U/L / AST: 18 U/L / GGT: x           PT/INR - ( 20 Jun 2020 09:19 )   PT: 30.8 sec;   INR: 2.60 ratio         PTT - ( 19 Jun 2020 08:37 )  PTT:58.0 sec  128|98|39<170  5.4|19|1.84  10.3,--,--  06-20 @ 07:09          RADIOLOGY & ADDITIONAL TESTS:    Imaging Personally Reviewed:  - MRI foot  < from: MR Ankle w/wo IV Cont, Right (06.18.20 @ 23:04) >  Evaluation for soft tissue infection is limited without intravenous contrast. Although there is heterogeneous marrow signal there is no skin ulceration, sinus tract or abscess identified with adjacent marrow signal abnormality suggest osteomyelitis.     There is edema within the abductor hallucis and flexor digitorum brevis muscles. There is minimal edema and marked atrophy within the adductor digit minimi muscle. These findings are likely related to denervation edema. The tendons at the ankle and hindfoot are intact. There is mild flexor hallucis longus tenosynovitis. There is mild spurring at the Achilles insertion. There is marked thickening at the middle cord of the plantar fascia which contains a small ossicle which is chronic. There is also focal thickening at the proximal lateral cortex of the plantar fascia likely related to old injury.     There is marked spurring dorsally at the second tarsometatarsal joint. There is edema within the sinus Tarsi with scarring.     The Lisfranc ligament is at the edge of the field although the interosseous ligament is grossly intact. The dorsal component is markedly thickened and chronically degenerated or torn. The distal syndesmotic ligaments are intact. The lateral collateral ligaments are intact. There is thickening at the deltoid ligament which is continuous.    Impression: Evaluation for soft tissue infection is limited without intravenous contrast. Heterogeneous marrow signal without an adjacent skin ulceration or fluid collection to suggest osteomyelitis.    Degenerative changes and denervation edema as above.    < end of copied text >      Consultant(s) Notes Reviewed:  ID, hepatology, nephrology, podiatry    Care Discussed with Consultants/Other Providers: Dr. Mo (hepatology), Dr. Pérez (ID)

## 2020-06-21 LAB
ALBUMIN SERPL ELPH-MCNC: 3.7 G/DL — SIGNIFICANT CHANGE UP (ref 3.3–5)
ALBUMIN SERPL ELPH-MCNC: 3.7 G/DL — SIGNIFICANT CHANGE UP (ref 3.3–5)
ALP SERPL-CCNC: 161 U/L — HIGH (ref 40–120)
ALP SERPL-CCNC: 170 U/L — HIGH (ref 40–120)
ALT FLD-CCNC: 10 U/L — SIGNIFICANT CHANGE UP (ref 10–45)
ALT FLD-CCNC: 13 U/L — SIGNIFICANT CHANGE UP (ref 10–45)
ANION GAP SERPL CALC-SCNC: 11 MMOL/L — SIGNIFICANT CHANGE UP (ref 5–17)
ANION GAP SERPL CALC-SCNC: 9 MMOL/L — SIGNIFICANT CHANGE UP (ref 5–17)
APPEARANCE UR: ABNORMAL
APTT BLD: 53 SEC — HIGH (ref 27.5–36.3)
AST SERPL-CCNC: 21 U/L — SIGNIFICANT CHANGE UP (ref 10–40)
AST SERPL-CCNC: 22 U/L — SIGNIFICANT CHANGE UP (ref 10–40)
BACTERIA # UR AUTO: NEGATIVE — SIGNIFICANT CHANGE UP
BILIRUB SERPL-MCNC: 7.4 MG/DL — HIGH (ref 0.2–1.2)
BILIRUB SERPL-MCNC: 7.4 MG/DL — HIGH (ref 0.2–1.2)
BILIRUB UR-MCNC: NEGATIVE — SIGNIFICANT CHANGE UP
BLD GP AB SCN SERPL QL: NEGATIVE — SIGNIFICANT CHANGE UP
BUN SERPL-MCNC: 48 MG/DL — HIGH (ref 7–23)
BUN SERPL-MCNC: 49 MG/DL — HIGH (ref 7–23)
CALCIUM SERPL-MCNC: 10 MG/DL — SIGNIFICANT CHANGE UP (ref 8.4–10.5)
CALCIUM SERPL-MCNC: 10 MG/DL — SIGNIFICANT CHANGE UP (ref 8.4–10.5)
CHLORIDE SERPL-SCNC: 97 MMOL/L — SIGNIFICANT CHANGE UP (ref 96–108)
CHLORIDE SERPL-SCNC: 97 MMOL/L — SIGNIFICANT CHANGE UP (ref 96–108)
CO2 SERPL-SCNC: 20 MMOL/L — LOW (ref 22–31)
CO2 SERPL-SCNC: 20 MMOL/L — LOW (ref 22–31)
COLOR SPEC: YELLOW — SIGNIFICANT CHANGE UP
CREAT ?TM UR-MCNC: 106 MG/DL — SIGNIFICANT CHANGE UP
CREAT SERPL-MCNC: 1.9 MG/DL — HIGH (ref 0.5–1.3)
CREAT SERPL-MCNC: 1.92 MG/DL — HIGH (ref 0.5–1.3)
DIFF PNL FLD: ABNORMAL
EPI CELLS # UR: 1 /HPF — SIGNIFICANT CHANGE UP (ref 0–5)
GAS PNL BLDV: SIGNIFICANT CHANGE UP
GLUCOSE SERPL-MCNC: 178 MG/DL — HIGH (ref 70–99)
GLUCOSE SERPL-MCNC: 204 MG/DL — HIGH (ref 70–99)
GLUCOSE UR QL: NEGATIVE — SIGNIFICANT CHANGE UP
HCT VFR BLD CALC: 20.8 % — CRITICAL LOW (ref 39–50)
HCT VFR BLD CALC: 22.9 % — LOW (ref 39–50)
HGB BLD-MCNC: 7 G/DL — CRITICAL LOW (ref 13–17)
HGB BLD-MCNC: 7.7 G/DL — LOW (ref 13–17)
HYALINE CASTS # UR AUTO: 0 /LPF — SIGNIFICANT CHANGE UP (ref 0–7)
INR BLD: 2.52 RATIO — HIGH (ref 0.88–1.16)
INR BLD: 2.62 RATIO — HIGH (ref 0.88–1.16)
KETONES UR-MCNC: NEGATIVE — SIGNIFICANT CHANGE UP
LEUKOCYTE ESTERASE UR-ACNC: ABNORMAL
MAGNESIUM SERPL-MCNC: 1.8 MG/DL — SIGNIFICANT CHANGE UP (ref 1.6–2.6)
MCHC RBC-ENTMCNC: 33.6 GM/DL — SIGNIFICANT CHANGE UP (ref 32–36)
MCHC RBC-ENTMCNC: 33.7 GM/DL — SIGNIFICANT CHANGE UP (ref 32–36)
MCHC RBC-ENTMCNC: 34.2 PG — HIGH (ref 27–34)
MCHC RBC-ENTMCNC: 34.8 PG — HIGH (ref 27–34)
MCV RBC AUTO: 101.8 FL — HIGH (ref 80–100)
MCV RBC AUTO: 103.5 FL — HIGH (ref 80–100)
NITRITE UR-MCNC: NEGATIVE — SIGNIFICANT CHANGE UP
NRBC # BLD: 0 /100 WBCS — SIGNIFICANT CHANGE UP (ref 0–0)
NRBC # BLD: 0 /100 WBCS — SIGNIFICANT CHANGE UP (ref 0–0)
OSMOLALITY UR: 474 MOSM/KG — SIGNIFICANT CHANGE UP (ref 50–1200)
PH UR: 6 — SIGNIFICANT CHANGE UP (ref 5–8)
PHOSPHATE SERPL-MCNC: 3.6 MG/DL — SIGNIFICANT CHANGE UP (ref 2.5–4.5)
PLATELET # BLD AUTO: 35 K/UL — LOW (ref 150–400)
PLATELET # BLD AUTO: 36 K/UL — LOW (ref 150–400)
POTASSIUM SERPL-MCNC: 5.2 MMOL/L — SIGNIFICANT CHANGE UP (ref 3.5–5.3)
POTASSIUM SERPL-MCNC: 5.4 MMOL/L — HIGH (ref 3.5–5.3)
POTASSIUM SERPL-SCNC: 5.2 MMOL/L — SIGNIFICANT CHANGE UP (ref 3.5–5.3)
POTASSIUM SERPL-SCNC: 5.4 MMOL/L — HIGH (ref 3.5–5.3)
PROCALCITONIN SERPL-MCNC: 0.12 NG/ML — HIGH (ref 0.02–0.1)
PROT SERPL-MCNC: 5.5 G/DL — LOW (ref 6–8.3)
PROT SERPL-MCNC: 5.9 G/DL — LOW (ref 6–8.3)
PROT UR-MCNC: SIGNIFICANT CHANGE UP
PROTHROM AB SERPL-ACNC: 29.6 SEC — HIGH (ref 10–12.9)
PROTHROM AB SERPL-ACNC: 30.8 SEC — HIGH (ref 10–12.9)
RBC # BLD: 2.01 M/UL — LOW (ref 4.2–5.8)
RBC # BLD: 2.25 M/UL — LOW (ref 4.2–5.8)
RBC # FLD: 19.1 % — HIGH (ref 10.3–14.5)
RBC # FLD: 19.3 % — HIGH (ref 10.3–14.5)
RBC CASTS # UR COMP ASSIST: 176 /HPF — HIGH (ref 0–4)
RH IG SCN BLD-IMP: POSITIVE — SIGNIFICANT CHANGE UP
SODIUM SERPL-SCNC: 126 MMOL/L — LOW (ref 135–145)
SODIUM SERPL-SCNC: 128 MMOL/L — LOW (ref 135–145)
SODIUM UR-SCNC: <35 MMOL/L — SIGNIFICANT CHANGE UP
SP GR SPEC: 1.02 — SIGNIFICANT CHANGE UP (ref 1.01–1.02)
UROBILINOGEN FLD QL: SIGNIFICANT CHANGE UP
UUN UR-MCNC: 865 MG/DL — SIGNIFICANT CHANGE UP
WBC # BLD: 2.55 K/UL — LOW (ref 3.8–10.5)
WBC # BLD: 2.72 K/UL — LOW (ref 3.8–10.5)
WBC # FLD AUTO: 2.55 K/UL — LOW (ref 3.8–10.5)
WBC # FLD AUTO: 2.72 K/UL — LOW (ref 3.8–10.5)
WBC UR QL: 23 /HPF — HIGH (ref 0–5)

## 2020-06-21 PROCEDURE — 99232 SBSQ HOSP IP/OBS MODERATE 35: CPT

## 2020-06-21 PROCEDURE — 99222 1ST HOSP IP/OBS MODERATE 55: CPT | Mod: GC

## 2020-06-21 PROCEDURE — 71045 X-RAY EXAM CHEST 1 VIEW: CPT | Mod: 26

## 2020-06-21 PROCEDURE — 99231 SBSQ HOSP IP/OBS SF/LOW 25: CPT

## 2020-06-21 PROCEDURE — 99233 SBSQ HOSP IP/OBS HIGH 50: CPT | Mod: GC

## 2020-06-21 PROCEDURE — 99291 CRITICAL CARE FIRST HOUR: CPT

## 2020-06-21 RX ORDER — LINEZOLID 600 MG/300ML
600 INJECTION, SOLUTION INTRAVENOUS EVERY 12 HOURS
Refills: 0 | Status: DISCONTINUED | OUTPATIENT
Start: 2020-06-22 | End: 2020-06-22

## 2020-06-21 RX ORDER — INSULIN GLARGINE 100 [IU]/ML
7 INJECTION, SOLUTION SUBCUTANEOUS AT BEDTIME
Refills: 0 | Status: DISCONTINUED | OUTPATIENT
Start: 2020-06-21 | End: 2020-06-26

## 2020-06-21 RX ORDER — PANTOPRAZOLE SODIUM 20 MG/1
40 TABLET, DELAYED RELEASE ORAL EVERY 12 HOURS
Refills: 0 | Status: DISCONTINUED | OUTPATIENT
Start: 2020-06-21 | End: 2020-07-02

## 2020-06-21 RX ORDER — PANTOPRAZOLE SODIUM 20 MG/1
40 TABLET, DELAYED RELEASE ORAL
Refills: 0 | Status: DISCONTINUED | OUTPATIENT
Start: 2020-06-21 | End: 2020-06-21

## 2020-06-21 RX ORDER — SODIUM ZIRCONIUM CYCLOSILICATE 10 G/10G
5 POWDER, FOR SUSPENSION ORAL THREE TIMES A DAY
Refills: 0 | Status: COMPLETED | OUTPATIENT
Start: 2020-06-21 | End: 2020-06-22

## 2020-06-21 RX ORDER — SODIUM CHLORIDE 9 MG/ML
1 INJECTION INTRAMUSCULAR; INTRAVENOUS; SUBCUTANEOUS EVERY 8 HOURS
Refills: 0 | Status: DISCONTINUED | OUTPATIENT
Start: 2020-06-21 | End: 2020-06-26

## 2020-06-21 RX ORDER — SODIUM CHLORIDE 9 MG/ML
1 INJECTION INTRAMUSCULAR; INTRAVENOUS; SUBCUTANEOUS EVERY 6 HOURS
Refills: 0 | Status: DISCONTINUED | OUTPATIENT
Start: 2020-06-21 | End: 2020-06-21

## 2020-06-21 RX ORDER — ALBUMIN HUMAN 25 %
100 VIAL (ML) INTRAVENOUS EVERY 8 HOURS
Refills: 0 | Status: DISCONTINUED | OUTPATIENT
Start: 2020-06-21 | End: 2020-06-22

## 2020-06-21 RX ADMIN — MIDODRINE HYDROCHLORIDE 20 MILLIGRAM(S): 2.5 TABLET ORAL at 12:50

## 2020-06-21 RX ADMIN — Medication 1 APPLICATION(S): at 05:48

## 2020-06-21 RX ADMIN — Medication 1 MILLIGRAM(S): at 12:48

## 2020-06-21 RX ADMIN — Medication 1 APPLICATION(S): at 17:43

## 2020-06-21 RX ADMIN — TAMSULOSIN HYDROCHLORIDE 0.4 MILLIGRAM(S): 0.4 CAPSULE ORAL at 22:48

## 2020-06-21 RX ADMIN — MIDODRINE HYDROCHLORIDE 20 MILLIGRAM(S): 2.5 TABLET ORAL at 05:41

## 2020-06-21 RX ADMIN — LACTULOSE 30 GRAM(S): 10 SOLUTION ORAL at 22:48

## 2020-06-21 RX ADMIN — SODIUM CHLORIDE 1 GRAM(S): 9 INJECTION INTRAMUSCULAR; INTRAVENOUS; SUBCUTANEOUS at 23:18

## 2020-06-21 RX ADMIN — Medication 500000 UNIT(S): at 22:48

## 2020-06-21 RX ADMIN — PANTOPRAZOLE SODIUM 40 MILLIGRAM(S): 20 TABLET, DELAYED RELEASE ORAL at 05:40

## 2020-06-21 RX ADMIN — LINEZOLID 600 MILLIGRAM(S): 600 INJECTION, SOLUTION INTRAVENOUS at 17:39

## 2020-06-21 RX ADMIN — POLYETHYLENE GLYCOL 3350 17 GRAM(S): 17 POWDER, FOR SOLUTION ORAL at 12:50

## 2020-06-21 RX ADMIN — NYSTATIN CREAM 1 APPLICATION(S): 100000 CREAM TOPICAL at 05:48

## 2020-06-21 RX ADMIN — ONDANSETRON 4 MILLIGRAM(S): 8 TABLET, FILM COATED ORAL at 22:48

## 2020-06-21 RX ADMIN — Medication 2: at 12:44

## 2020-06-21 RX ADMIN — Medication 3 UNIT(S): at 09:17

## 2020-06-21 RX ADMIN — Medication 1: at 09:17

## 2020-06-21 RX ADMIN — LINEZOLID 600 MILLIGRAM(S): 600 INJECTION, SOLUTION INTRAVENOUS at 05:41

## 2020-06-21 RX ADMIN — Medication 500000 UNIT(S): at 05:41

## 2020-06-21 RX ADMIN — ONDANSETRON 4 MILLIGRAM(S): 8 TABLET, FILM COATED ORAL at 15:14

## 2020-06-21 RX ADMIN — Medication 1 TABLET(S): at 12:50

## 2020-06-21 RX ADMIN — PANTOPRAZOLE SODIUM 40 MILLIGRAM(S): 20 TABLET, DELAYED RELEASE ORAL at 17:42

## 2020-06-21 RX ADMIN — Medication 500000 UNIT(S): at 17:41

## 2020-06-21 RX ADMIN — SODIUM ZIRCONIUM CYCLOSILICATE 5 GRAM(S): 10 POWDER, FOR SUSPENSION ORAL at 15:12

## 2020-06-21 RX ADMIN — ONDANSETRON 4 MILLIGRAM(S): 8 TABLET, FILM COATED ORAL at 05:40

## 2020-06-21 RX ADMIN — INSULIN GLARGINE 7 UNIT(S): 100 INJECTION, SOLUTION SUBCUTANEOUS at 22:18

## 2020-06-21 RX ADMIN — LACTULOSE 30 GRAM(S): 10 SOLUTION ORAL at 05:41

## 2020-06-21 RX ADMIN — Medication 3 UNIT(S): at 12:44

## 2020-06-21 RX ADMIN — Medication 50 MILLILITER(S): at 23:19

## 2020-06-21 RX ADMIN — Medication 3 UNIT(S): at 17:30

## 2020-06-21 RX ADMIN — LACTULOSE 30 GRAM(S): 10 SOLUTION ORAL at 17:40

## 2020-06-21 RX ADMIN — Medication 500000 UNIT(S): at 12:50

## 2020-06-21 RX ADMIN — NYSTATIN CREAM 1 APPLICATION(S): 100000 CREAM TOPICAL at 17:41

## 2020-06-21 RX ADMIN — Medication 2: at 17:30

## 2020-06-21 RX ADMIN — SODIUM ZIRCONIUM CYCLOSILICATE 5 GRAM(S): 10 POWDER, FOR SUSPENSION ORAL at 23:19

## 2020-06-21 RX ADMIN — MIDODRINE HYDROCHLORIDE 20 MILLIGRAM(S): 2.5 TABLET ORAL at 17:39

## 2020-06-21 RX ADMIN — LACTULOSE 30 GRAM(S): 10 SOLUTION ORAL at 12:48

## 2020-06-21 NOTE — PROGRESS NOTE ADULT - SUBJECTIVE AND OBJECTIVE BOX
Transplant Surgery    Present:   Patient seen and examined with multidisciplinary team including Transplant Surgeon: Dr. Ackerman, hepatologist Dr. Mo, NP Flora Akers in AM rounds with Dr. Ackerman. Disciplines not in attendance will be notified of the plan.     HPI:  65 y/o gentleman with IDDM, dyslipidemia, obesity, GERD, HFpEF with mild LV diastolic dysfunction, MGUS, and decompensated JEAN cirrhosis complicated by ascites, history of SBP, hepatic encephalopathy, and chronic anemia with a history of duodenal ulcer as well as GAVE and duodenal AVM s/p APC (last on 10/11/19), UNOS listed for liver transplantation at [facility]. 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 He is now re-admitted with hepatic encephalopathy and VRE UTI being treated w/ linezolid. Also admitted with MISA on CKD.  MELD 32    Blood type A negative    Interval Events:   -AOx-3, vitals stable, afebrile   - MELD 34 today.   -Worsening Cr, INR, and Na, Tbili improved.   -On linezolid for VRE UTI per transplant ID recs, started on 6/15, ends tomorrow (6/21)   -On Bactrim for UTI PPx (previous recurrent ESBL E coli infection)   -R heel w/ ulcer; no evidence of infection, evaluated by podiatry, XR and MRI of foot both neg for OM.    Plan of care:  See Below      MEDICATIONS  (STANDING):  dextrose 5%. 1000 milliLiter(s) (50 mL/Hr) IV Continuous <Continuous>  dextrose 50% Injectable 12.5 Gram(s) IV Push once  dextrose 50% Injectable 25 Gram(s) IV Push once  dextrose 50% Injectable 25 Gram(s) IV Push once  folic acid 1 milliGRAM(s) Oral daily  hepatitis B Vaccine - Adult (ENGERIX-B) 20 MICROGram(s) IntraMuscular once  insulin glargine Injectable (LANTUS) 7 Unit(s) SubCutaneous at bedtime  insulin lispro (HumaLOG) corrective regimen sliding scale   SubCutaneous three times a day before meals  insulin lispro (HumaLOG) corrective regimen sliding scale   SubCutaneous at bedtime  insulin lispro Injectable (HumaLOG) 3 Unit(s) SubCutaneous three times a day before meals  lactulose Syrup 30 Gram(s) Oral every 6 hours  linezolid    Tablet 600 milliGRAM(s) Oral every 12 hours  midodrine. 20 milliGRAM(s) Oral three times a day  nystatin    Suspension 810119 Unit(s) Oral four times a day  nystatin Cream 1 Application(s) Topical two times a day  ondansetron Injectable 4 milliGRAM(s) IV Push every 8 hours  pantoprazole    Tablet 40 milliGRAM(s) Oral two times a day  polyethylene glycol 3350 17 Gram(s) Oral daily  rifAXIMin 550 milliGRAM(s) Oral two times a day  silver sulfADIAZINE 1% Cream 1 Application(s) Topical two times a day  sodium zirconium cyclosilicate 5 Gram(s) Oral three times a day  tamsulosin 0.4 milliGRAM(s) Oral at bedtime  trimethoprim  160 mG/sulfamethoxazole 800 mG 1 Tablet(s) Oral daily    MEDICATIONS  (PRN):  acetaminophen   Tablet .. 650 milliGRAM(s) Oral every 8 hours PRN Moderate Pain (4 - 6)  dextrose 40% Gel 15 Gram(s) Oral once PRN Blood Glucose LESS THAN 70 milliGRAM(s)/deciliter  glucagon  Injectable 1 milliGRAM(s) IntraMuscular once PRN Glucose LESS THAN 70 milligrams/deciliter      PAST MEDICAL & SURGICAL HISTORY:  GIB (gastrointestinal bleeding)  GERD with esophagitis: Gastritis &amp; Non Bleeding Ulcers  Hepatic encephalopathy  Obesity  Fatty liver disease, nonalcoholic  Renal stones: 25 years ago  Hypertension  Neuropathy  Hypercholesteremia  Diabetes  S/P cholecystectomy      Vital Signs Last 24 Hrs  T(C): 36.7 (21 Jun 2020 08:40), Max: 36.8 (20 Jun 2020 20:18)  T(F): 98.1 (21 Jun 2020 08:40), Max: 98.2 (20 Jun 2020 20:18)  HR: 80 (21 Jun 2020 08:40) (66 - 87)  BP: 119/64 (21 Jun 2020 11:34) (90/41 - 119/66)  BP(mean): --  RR: 18 (21 Jun 2020 08:40) (18 - 18)  SpO2: 95% (21 Jun 2020 08:40) (95% - 98%)    I&O's Summary    20 Jun 2020 07:01  -  21 Jun 2020 07:00  --------------------------------------------------------  IN: 550 mL / OUT: 1400 mL / NET: -850 mL    21 Jun 2020 07:01  -  21 Jun 2020 11:47  --------------------------------------------------------  IN: 320 mL / OUT: 400 mL / NET: -80 mL          LABS:                        7.0    2.72  )-----------( 36       ( 21 Jun 2020 09:30 )             20.8     06-21    128<L>  |  97  |  49<H>  ----------------------------<  178<H>  5.4<H>   |  20<L>  |  1.90<H>    Ca    10.0      21 Jun 2020 09:30  Phos  3.6     06-21  Mg     1.8     06-21    TPro  5.5<L>  /  Alb  3.7  /  TBili  7.4<H>  /  DBili  x   /  AST  21  /  ALT  10  /  AlkPhos  161<H>  06-21    PT/INR - ( 21 Jun 2020 09:30 )   PT: 30.8 sec;   INR: 2.62 ratio             CAPILLARY BLOOD GLUCOSE      POCT Blood Glucose.: 197 mg/dL (21 Jun 2020 09:06)  POCT Blood Glucose.: 242 mg/dL (20 Jun 2020 21:40)  POCT Blood Glucose.: 218 mg/dL (20 Jun 2020 17:58)  POCT Blood Glucose.: 171 mg/dL (20 Jun 2020 13:43)    LIVER FUNCTIONS - ( 21 Jun 2020 09:30 )  Alb: 3.7 g/dL / Pro: 5.5 g/dL / ALK PHOS: 161 U/L / ALT: 10 U/L / AST: 21 U/L / GGT: x             Culture - Urine (collected 06-14-20 @ 14:07)  Source: .Urine Catheterized  Final Report (06-16-20 @ 10:44):    >100,000 CFU/ml Enterococcus faecium (vancomycin resistant)  Organism: Enterococcus faecium (vancomycin resistant) (06-16-20 @ 10:44)  Organism: Enterococcus faecium (vancomycin resistant) (06-16-20 @ 10:44)          Cultures:    Culture - Urine (collected 06-14-20 @ 14:07)  Source: .Urine Catheterized  Final Report (06-16-20 @ 10:44):    >100,000 CFU/ml Enterococcus faecium (vancomycin resistant)  Organism: Enterococcus faecium (vancomycin resistant) (06-16-20 @ 10:44)  Organism: Enterococcus faecium (vancomycin resistant) (06-16-20 @ 10:44)      	  Review of systems  Gen: No weight changes, fatigue, fevers/chills, weakness  Skin: Jaundice, +R heel ulcer  Head/Eyes/Ears/Mouth: No headache; Normal hearing; Normal vision w/o blurriness; No sinus pain/discomfort.    Respiratory: No dyspnea, cough, wheezing, hemoptysis  CV: No chest pain, PND, orthopnea  GI: denies diarrhea, constipation, nausea, vomiting, melena, hematochezia  : No increased frequency, dysuria, hematuria, nocturia  MSK: No joint pain/swelling; no back pain; no edema  Neuro: No dizziness/lightheadedness, weakness, seizures, numbness, tingling  Heme: No easy bruising or bleeding  Endo: No heat/cold intolerance  Psych: No significant nervousness, anxiety, stress, depression  All other systems were reviewed and are negative, except as noted.        PHYSICAL EXAM:  Constitutional: Well developed / well nourished  Eyes: Anicteric, PERRLA  ENMT: nc/at  Neck: supple  Respiratory: CTA B/L  Cardiovascular: RRR  Abdominal:  soft, NT, ND  Genitourinary: Voiding spontaneously  Extremities: R heel wound w/ overlying eschar w/ no signs of infection.   Vascular: Palpable dp pulses bilaterally  Neurological: A&O x2-3  Skin: no rashes, ulcerations or lesions   Musculoskeletal: Moving all extremities  Psychiatric: Responsive

## 2020-06-21 NOTE — PROGRESS NOTE ADULT - PROBLEM SELECTOR PROBLEM 7
Anemia, unspecified type
Diabetes
Anemia, unspecified type
Anemia, unspecified type

## 2020-06-21 NOTE — PROGRESS NOTE ADULT - PROBLEM SELECTOR PROBLEM 6
Diabetes
Other heart failure
Diabetes
Diabetes

## 2020-06-21 NOTE — PROGRESS NOTE ADULT - SUBJECTIVE AND OBJECTIVE BOX
Chief Complaint:  Patient is a 64y old  Male who presents with a chief complaint of Liver failure, hepatic encephalopathy (21 Jun 2020 11:35)    Reason for consult: AMS, cirrhosis    Interval Events: Pt feeling well, no overnight events.     Hospital Medications:  acetaminophen   Tablet .. 650 milliGRAM(s) Oral every 8 hours PRN  dextrose 40% Gel 15 Gram(s) Oral once PRN  dextrose 5%. 1000 milliLiter(s) IV Continuous <Continuous>  dextrose 50% Injectable 12.5 Gram(s) IV Push once  dextrose 50% Injectable 25 Gram(s) IV Push once  dextrose 50% Injectable 25 Gram(s) IV Push once  folic acid 1 milliGRAM(s) Oral daily  glucagon  Injectable 1 milliGRAM(s) IntraMuscular once PRN  hepatitis B Vaccine - Adult (ENGERIX-B) 20 MICROGram(s) IntraMuscular once  insulin glargine Injectable (LANTUS) 7 Unit(s) SubCutaneous at bedtime  insulin lispro (HumaLOG) corrective regimen sliding scale   SubCutaneous three times a day before meals  insulin lispro (HumaLOG) corrective regimen sliding scale   SubCutaneous at bedtime  insulin lispro Injectable (HumaLOG) 3 Unit(s) SubCutaneous three times a day before meals  lactulose Syrup 30 Gram(s) Oral every 6 hours  linezolid    Tablet 600 milliGRAM(s) Oral every 12 hours  midodrine. 20 milliGRAM(s) Oral three times a day  nystatin    Suspension 374991 Unit(s) Oral four times a day  nystatin Cream 1 Application(s) Topical two times a day  ondansetron Injectable 4 milliGRAM(s) IV Push every 8 hours  pantoprazole    Tablet 40 milliGRAM(s) Oral two times a day  polyethylene glycol 3350 17 Gram(s) Oral daily  rifAXIMin 550 milliGRAM(s) Oral two times a day  silver sulfADIAZINE 1% Cream 1 Application(s) Topical two times a day  sodium zirconium cyclosilicate 5 Gram(s) Oral three times a day  tamsulosin 0.4 milliGRAM(s) Oral at bedtime  trimethoprim  160 mG/sulfamethoxazole 800 mG 1 Tablet(s) Oral daily      ROS:   General:  No  fevers, chills, night sweats, fatigue  Eyes:  Good vision, no reported pain  ENT:  No sore throat, pain, runny nose  CV:  No pain, palpitations  Pulm:  No dyspnea, cough  GI:  See HPI, otherwise negative  :  No  incontinence, nocturia  Muscle:  No pain, weakness  Neuro:  No memory problems  Psych:  No insomnia, mood problems, depression  Endocrine:  No polyuria, polydipsia, cold/heat intolerance  Heme:  No petechiae, ecchymosis, easy bruisability  Skin:  No rash    PHYSICAL EXAM:   Vital Signs:  Vital Signs Last 24 Hrs  T(C): 36.7 (21 Jun 2020 08:40), Max: 36.8 (20 Jun 2020 20:18)  T(F): 98.1 (21 Jun 2020 08:40), Max: 98.2 (20 Jun 2020 20:18)  HR: 80 (21 Jun 2020 08:40) (66 - 87)  BP: 119/64 (21 Jun 2020 11:34) (90/41 - 119/66)  BP(mean): --  RR: 18 (21 Jun 2020 08:40) (18 - 18)  SpO2: 95% (21 Jun 2020 08:40) (95% - 98%)  Daily     Daily     GENERAL: no acute distress  NEURO: alert, no asterixis, able to complex concentration tasks today  HEENT: icteric sclera, no conjunctival pallor appreciated  CHEST: no respiratory distress, no accessory muscle use  CARDIAC: regular rate, rhythm  ABDOMEN: soft, non-tender, non-distended, no rebound or guarding  EXTREMITIES: warm, well perfused, no edema, R heel ulcer c/d/i w/ minimal serosanguinous drainage  SKIN: no lesions noted    LABS: reviewed                        7.0    2.72  )-----------( 36       ( 21 Jun 2020 09:30 )             20.8     06-21    128<L>  |  97  |  49<H>  ----------------------------<  178<H>  5.4<H>   |  20<L>  |  1.90<H>    Ca    10.0      21 Jun 2020 09:30  Phos  3.6     06-21  Mg     1.8     06-21    TPro  5.5<L>  /  Alb  3.7  /  TBili  7.4<H>  /  DBili  x   /  AST  21  /  ALT  10  /  AlkPhos  161<H>  06-21    LIVER FUNCTIONS - ( 21 Jun 2020 09:30 )  Alb: 3.7 g/dL / Pro: 5.5 g/dL / ALK PHOS: 161 U/L / ALT: 10 U/L / AST: 21 U/L / GGT: x             Interval Diagnostic Studies: see sunrise for full report Chief Complaint:  Patient is a 64y old  Male who presents with a chief complaint of Liver failure, hepatic encephalopathy (21 Jun 2020 11:35)    Reason for consult: AMS, cirrhosis    Interval Events: Pt feeling well, no overnight events.     Hospital Medications:  acetaminophen   Tablet .. 650 milliGRAM(s) Oral every 8 hours PRN  dextrose 40% Gel 15 Gram(s) Oral once PRN  dextrose 5%. 1000 milliLiter(s) IV Continuous <Continuous>  dextrose 50% Injectable 12.5 Gram(s) IV Push once  dextrose 50% Injectable 25 Gram(s) IV Push once  dextrose 50% Injectable 25 Gram(s) IV Push once  folic acid 1 milliGRAM(s) Oral daily  glucagon  Injectable 1 milliGRAM(s) IntraMuscular once PRN  hepatitis B Vaccine - Adult (ENGERIX-B) 20 MICROGram(s) IntraMuscular once  insulin glargine Injectable (LANTUS) 7 Unit(s) SubCutaneous at bedtime  insulin lispro (HumaLOG) corrective regimen sliding scale   SubCutaneous three times a day before meals  insulin lispro (HumaLOG) corrective regimen sliding scale   SubCutaneous at bedtime  insulin lispro Injectable (HumaLOG) 3 Unit(s) SubCutaneous three times a day before meals  lactulose Syrup 30 Gram(s) Oral every 6 hours  linezolid    Tablet 600 milliGRAM(s) Oral every 12 hours  midodrine. 20 milliGRAM(s) Oral three times a day  nystatin    Suspension 444764 Unit(s) Oral four times a day  nystatin Cream 1 Application(s) Topical two times a day  ondansetron Injectable 4 milliGRAM(s) IV Push every 8 hours  pantoprazole    Tablet 40 milliGRAM(s) Oral two times a day  polyethylene glycol 3350 17 Gram(s) Oral daily  rifAXIMin 550 milliGRAM(s) Oral two times a day  silver sulfADIAZINE 1% Cream 1 Application(s) Topical two times a day  sodium zirconium cyclosilicate 5 Gram(s) Oral three times a day  tamsulosin 0.4 milliGRAM(s) Oral at bedtime  trimethoprim  160 mG/sulfamethoxazole 800 mG 1 Tablet(s) Oral daily    ROS:   General:  No  fevers, chills, night sweats, fatigue  Eyes:  Good vision, no reported pain  ENT:  No sore throat, pain, runny nose  CV:  No pain, palpitations  Pulm:  No dyspnea, cough  GI:  See HPI, otherwise negative  :  No  incontinence, nocturia  Muscle:  No pain, weakness  Neuro:  No memory problems  Psych:  No insomnia, mood problems, depression  Endocrine:  No polyuria, polydipsia, cold/heat intolerance  Heme:  No petechiae, ecchymosis, easy bruisability  Skin:  No rash    PHYSICAL EXAM:   Vital Signs:  Vital Signs Last 24 Hrs  T(C): 36.7 (21 Jun 2020 08:40), Max: 36.8 (20 Jun 2020 20:18)  T(F): 98.1 (21 Jun 2020 08:40), Max: 98.2 (20 Jun 2020 20:18)  HR: 80 (21 Jun 2020 08:40) (66 - 87)  BP: 119/64 (21 Jun 2020 11:34) (90/41 - 119/66)  BP(mean): --  RR: 18 (21 Jun 2020 08:40) (18 - 18)  SpO2: 95% (21 Jun 2020 08:40) (95% - 98%)  Daily     Daily     GENERAL: no acute distress  NEURO: alert, no asterixis, able to complex concentration tasks today  HEENT: icteric sclera, no conjunctival pallor appreciated  CHEST: no respiratory distress, no accessory muscle use  CARDIAC: regular rate, rhythm  ABDOMEN: soft, non-tender, non-distended, no rebound or guarding  EXTREMITIES: warm, well perfused, no edema, R heel ulcer c/d/i w/ minimal serosanguinous drainage, no erythema/purulence  SKIN: no lesions noted    LABS: reviewed                        7.0    2.72  )-----------( 36       ( 21 Jun 2020 09:30 )             20.8     06-21    128<L>  |  97  |  49<H>  ----------------------------<  178<H>  5.4<H>   |  20<L>  |  1.90<H>    Ca    10.0      21 Jun 2020 09:30  Phos  3.6     06-21  Mg     1.8     06-21    TPro  5.5<L>  /  Alb  3.7  /  TBili  7.4<H>  /  DBili  x   /  AST  21  /  ALT  10  /  AlkPhos  161<H>  06-21    LIVER FUNCTIONS - ( 21 Jun 2020 09:30 )  Alb: 3.7 g/dL / Pro: 5.5 g/dL / ALK PHOS: 161 U/L / ALT: 10 U/L / AST: 21 U/L / GGT: x             Interval Diagnostic Studies: see sunrise for full report

## 2020-06-21 NOTE — PROGRESS NOTE ADULT - PROBLEM SELECTOR PLAN 5
- AM labs pending.   - appreciate nephrology recs  - continue to hold diuretics and albumin.  - bob in place. Remove and perform TOV.   - continue to monitor BMP  #. Hyponatremia -- from hypervolemia. C/w FW restriction. Monitor bmp.  #. Hyperkalemia -- in setting of dena. AM labs pending. Rx as needed. - AM labs pending.   - appreciate nephrology recs  - continue to hold diuretics and albumin.  - bob in place. Remove and perform TOV.   - continue to monitor BMP  #. Hyponatremia -- from hypervolemia. ?siadh. C/w FW restriction. Monitor bmp.  #. Hyperkalemia -- in setting of dena. AM labs pending. Rx as needed. - Cr mild uptrend today. Continue to monitor closely.   - Repeat urine studies.  - appreciate nephrology recs  - continue to hold diuretics and albumin for now  - bob in place. Remove and perform TOV.   - continue to monitor BMP  #. Hyponatremia -- from hypervolemia. ?siadh. C/w FW restriction. Monitor bmp.  #. Hyperkalemia -- in setting of dena. Mildly elevated but same as yesterday. Give Lokelma 5 mg po TID for 3 doses. Repeat BMP tomorrow.

## 2020-06-21 NOTE — PROGRESS NOTE ADULT - SUBJECTIVE AND OBJECTIVE BOX
HOSPITALIST NOTE    Dr. Gianluca Godoy DO  Attending Physician  Division of Hospital Medicine  [facility]  Pager:  [phone]    SUBJECTIVE  Reports having 4 BMs a day.  Reports feeling generalized weakness but motivated to get out of bed to chair and work with pt.  No other complaints.    REVIEW OF SYSTEMS  Review of systems negative except for items noted above.      PAST MEDICAL & SURGICAL HISTORY:  GIB (gastrointestinal bleeding)  GERD with esophagitis: Gastritis &amp; Non Bleeding Ulcers  Hepatic encephalopathy  Obesity  Fatty liver disease, nonalcoholic  Renal stones: 25 years ago  Hypertension  Neuropathy  Hypercholesteremia  Diabetes  S/P cholecystectomy      MEDICATIONS  (STANDING):  dextrose 5%. 1000 milliLiter(s) (50 mL/Hr) IV Continuous <Continuous>  dextrose 50% Injectable 12.5 Gram(s) IV Push once  dextrose 50% Injectable 25 Gram(s) IV Push once  dextrose 50% Injectable 25 Gram(s) IV Push once  folic acid 1 milliGRAM(s) Oral daily  hepatitis B Vaccine - Adult (ENGERIX-B) 20 MICROGram(s) IntraMuscular once  insulin glargine Injectable (LANTUS) 4 Unit(s) SubCutaneous at bedtime  insulin lispro (HumaLOG) corrective regimen sliding scale   SubCutaneous three times a day before meals  insulin lispro (HumaLOG) corrective regimen sliding scale   SubCutaneous at bedtime  insulin lispro Injectable (HumaLOG) 3 Unit(s) SubCutaneous three times a day before meals  lactulose Syrup 30 Gram(s) Oral every 6 hours  linezolid    Tablet 600 milliGRAM(s) Oral every 12 hours  midodrine. 20 milliGRAM(s) Oral three times a day  nystatin    Suspension 316203 Unit(s) Oral four times a day  nystatin Cream 1 Application(s) Topical two times a day  ondansetron Injectable 4 milliGRAM(s) IV Push every 8 hours  pantoprazole    Tablet 40 milliGRAM(s) Oral two times a day  polyethylene glycol 3350 17 Gram(s) Oral daily  rifAXIMin 550 milliGRAM(s) Oral two times a day  silver sulfADIAZINE 1% Cream 1 Application(s) Topical two times a day  tamsulosin 0.4 milliGRAM(s) Oral at bedtime  trimethoprim  160 mG/sulfamethoxazole 800 mG 1 Tablet(s) Oral daily    MEDICATIONS  (PRN):  acetaminophen   Tablet .. 650 milliGRAM(s) Oral every 8 hours PRN Moderate Pain (4 - 6)  dextrose 40% Gel 15 Gram(s) Oral once PRN Blood Glucose LESS THAN 70 milliGRAM(s)/deciliter  glucagon  Injectable 1 milliGRAM(s) IntraMuscular once PRN Glucose LESS THAN 70 milligrams/deciliter      Allergies    codeine (Anaphylaxis)    Intolerances        T(C): 36.7 (06-21-20 @ 08:40), Max: 36.8 (06-20-20 @ 20:18)  T(F): 98.1 (06-21-20 @ 08:40), Max: 98.2 (06-20-20 @ 20:18)  HR: 80 (06-21-20 @ 08:40) (66 - 87)  BP: 99/59 (06-21-20 @ 08:40) (99/59 - 119/66)  ABP: --  ABP(mean): --  RR: 18 (06-21-20 @ 08:40) (18 - 18)  SpO2: 95% (06-21-20 @ 08:40) (95% - 98%)      GENERAL: NAD  NECK: Supple, No JVD  CHEST/LUNG: Clear to auscultation bilaterally; No wheeze  HEART: Regular rate and rhythm; No murmurs, rubs, or gallops  ABDOMEN: Soft, Nontender, Nondistended; Bowel sounds present  EXTREMITIES: R heel ulcer - cdi -- unwrapped at bedside with hepatology team. Trace LE edema b/l. Ext warm and well perfused.  NEUROLOGY: AAOx2; moving all ext. Trace hand tremor. No clear asterixis on my exam.   PSYCH: normal mood and affect.    LABS                        7.7    3.31  )-----------( 46       ( 20 Jun 2020 07:09 )             22.5     06-20    128<L>  |  98  |  39<H>  ----------------------------<  170<H>  5.4<H>   |  19<L>  |  1.84<H>    Ca    10.3      20 Jun 2020 07:09    TPro  5.4<L>  /  Alb  3.9  /  TBili  8.6<H>  /  DBili  2.0<H>  /  AST  18  /  ALT  10  /  AlkPhos  125<H>  06-20    PT/INR - ( 20 Jun 2020 09:19 )   PT: 30.8 sec;   INR: 2.60 ratio           CONSULTANTS:  Hepatology -- personally discussed care. Recs appreciated.

## 2020-06-21 NOTE — PROGRESS NOTE ADULT - PROBLEM SELECTOR PLAN 1
Multifactorial: Chronic slow bleed 2/2 known AVM/duodenal GAVE and mild iron deficiency/spur cell anemia in setting on decompensated cirrhosis;   - hgb stable s/p 1 unit pRBC transfusion  - monitor cbc    - no signs of active bleed at this time: low suspicion for active acute GIB  - brown stool.   - T&S ordered for today 6/21. Multifactorial: Chronic slow bleed 2/2 known AVM/duodenal GAVE and mild iron deficiency/spur cell anemia in setting on decompensated cirrhosis;   - hgb stable s/p 1 unit pRBC transfusion  - monitor cbc    - no signs of active bleed at this time: low suspicion for active acute GIB  - brown stool.   - CBC and T&S pending today 6/21. Multifactorial: Chronic slow bleed 2/2 known AVM/duodenal GAVE and mild iron deficiency/spur cell anemia in setting on decompensated cirrhosis;   - hgb stable s/p 1 unit pRBC transfusion  - monitor cbc    - no signs of active bleed at this time: low suspicion for active acute GIB  - brown stool.   - Hgb of 7 this AM. Cbc appears to be diluted. Repeat CBC with T&S at noon. If Hgb <7, will transfuse 1 unit of prbc.

## 2020-06-21 NOTE — PROGRESS NOTE ADULT - PROBLEM SELECTOR PROBLEM 8
Heel ulcer
Thrush of mouth and esophagus
Heel ulcer
Thrush of mouth and esophagus

## 2020-06-21 NOTE — PROGRESS NOTE ADULT - PROBLEM SELECTOR PLAN 9
DVT: SCDs alone given elevated inr.   Code: Full   Dispo: Home
DVT: SCDs alone given elevated inr.   Diet: DASH/TLC/CC  Code: Full   Dispo: Home
c/w protonix 40mg PO daily
DVT: SCDs alone given elevated inr.   Diet: DASH/TLC/CC  Code: Full   Dispo: Home
c/w protonix 40mg PO daily

## 2020-06-21 NOTE — PROGRESS NOTE ADULT - ATTENDING COMMENTS
oriented  no complaints   heel ulcer - slough over clean base, no signs of pus or cellulitis    Plan   stop IVAbx after today

## 2020-06-21 NOTE — CONSULT NOTE ADULT - ATTENDING COMMENTS
admitted 6/11/2020 for decompensated cirrhosis    JEAN cirrhosis with ascites    mild hepatic encephalopathy  -lactulose  -rifaximin    acute on chronic blood loss anemia  -7.0 -> 1u RBC (6/21) -> 7.7 g/dL  -no overt evidence of active GI bleed    VRE UTI  -linezolid (6/15 - ?) admitted 6/11/2020 for decompensated cirrhosis    JEAN cirrhosis with ascites    mild hepatic encephalopathy  -lactulose  -rifaximin    acute on chronic blood loss anemia  -7.0 -> 1u RBC (6/21) -> 7.7 g/dL  -no overt evidence of active GI bleed    MISA  acute hyperkalemia  -Lokelma    VRE UTI  -linezolid (6/15 - ?)

## 2020-06-21 NOTE — PROGRESS NOTE ADULT - ATTENDING COMMENTS
65 yo M with IDDM, dyslipidemia, obesity, GERD, HFpEF with mild LV diastolic dysfunction, MGUS, and decompensated JEAN cirrhosis complicated by ascites, history of SBP, hepatic encephalopathy, and chronic anemia with a history of duodenal ulcer as well as GAVE and duodenal AVM s/p APC (last on 10/11/19), who is UNOS listed for liver transplantation at [facility]. 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 He is re-admitted with hepatic encephalopathy, recurrent UTI, and MISA on CKD.    # Complicated UTI with VRE: Completing 7-day course of linezolid today (6/15-21), as per Transplant ID. Also remains on Bactrim prophylaxis (given prior ESBL E. coli infection). Blood cultures remain negative. Afebrile and without leukocytosis.    # Right heel ulcer: No evidence of active infection, including based on repeat MRI done as well as repeated bedside evaluations by Podiatry and Transplant ID in addition to our team. Continue wound care.    # Hepatic encephalopathy: Resolved, now back to his baseline mental status today. Continue rifaximin, Miralax, and lactulose and will titrate as needed.    # MISA on CKD: Cr gradually increasing, up to 1.9 today, but non-oliguric. UA with RBCs and WBCs but no bacteria or nitrite. Urine Na <35. Re-start albumin 25% at 100 mL iv q8h x3 doses given concern for intravascular volume depletion (especially given episode of orthostatic hypotension today when working with PT). Discussed with primary team re: removing Beasley catheter and trial of voiding.    # Hyponatremia: Continue fluid restriction. Not opposed to adding Glucerna protein shakes, as per Transplant Nephrology recommendations.    # Orthostatic hypotension: Chronic, related to end-stage liver disease. Continue midodrine. Re-start albumin 25% today, as above, given concern for some intravascular volume depletion as also contributing.    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 Defer EGD for now, unless overt bleeding seen. S/p 1 unit PRBCs on 6/17. Receiving additional 1 unit PRBCs today. Continue to trend.    # Decompensated JEAN cirrhosis: He is currently UNOS wait-listed for liver transplantation at [facility], blood type A, listed at MELD-Na 35 (6/17/20), with current MELD-Na 34 today. Please continue to check MELD labs daily.    Please don't hesitate to call with any questions/concerns.    Letitia Mo M.D., Ph.D.  Transplant Hepatology  Cell: [phone]

## 2020-06-21 NOTE — CONSULT NOTE ADULT - SUBJECTIVE AND OBJECTIVE BOX
SICU CONSULT NOTE    HPI  63 yo M PMHx of decompensated JEAN cirrhosis c/b small esophageal varices (12/2019), portal hypertensive gastropathy, ascites, hx SBP, hx hepatic encephalopathy, GAVE syndrome s/p APC, hx duodenal ulcer (5/2019), HTN, HLD, DM, HFpEF (EF 70%), recent ESBL E coli prostate abscess (3/2020 s/p 4 weeks ertapenem), orthostatic hypotension 2/2 midodrine presents with reports of confusion. Saw his hepatologist was noted to be more jaundiced and more confused than normal. Per wife his was not oriented to time and place, and forgot how many grandchildren he had. He also urinated in his bedroom. He was confused for roughly 12 hours. He is now back to his baseline mental status. He denies fever, chills, dysuria, increased urinary frequency, flank pain, neck stiffness. No melena, hematochezia, hematemesis. Patient found to have VRE UTI, being treated with linezolid.     Hospital course: Pt had black tarry BM this AM, first bloody BM this admission Hg dropped to 7 from 7.7. Pt worked with PT and was sat up in bed. He became dizzy and confused, BP measured to be 90/41, then improved to 119/64 when placed supine again. Pt remembers the whole event. He currently is at baseline mental status and eating lunch. Denies CP, SOB, abd pain, N/V/D. EGD 12/2019 showed small nonbleeding esophageal varices, portal hypertensive gastropathy, and GAVE s/p APC. Initially MICU consulted, ICU not indicated at that time. Patient received 1u PRBC on the floor with minimal response in H/H. Transplant team concerned for worsening hepatic encephalopathy and mental status. Patient AAOx3; however, per transplant team having difficulty spelling words. Patient transferred to SICU for closer monitoring. MELD today -34     PAST MEDICAL HISTORY: GIB (gastrointestinal bleeding)  GERD with esophagitis  Hepatic encephalopathy  Obesity  Fatty liver disease, nonalcoholic  Renal stones  Hypertension  Neuropathy  Hypercholesteremia  Diabetes      PAST SURGICAL HISTORY: S/P cholecystectomy  No significant past surgical history      FAMILY HISTORY: Family history of type 2 diabetes mellitus  Family history of hypertension  Family history of stomach cancer  No pertinent family history in first degree relatives      ALLERGIES: codeine (Anaphylaxis)      VITAL SIGNS:  ICU Vital Signs Last 24 Hrs  T(C): 36.4 (21 Jun 2020 20:56), Max: 36.7 (21 Jun 2020 08:40)  T(F): 97.5 (21 Jun 2020 20:56), Max: 98.1 (21 Jun 2020 08:40)  HR: 73 (21 Jun 2020 22:00) (66 - 84)  BP: 120/58 (21 Jun 2020 22:00) (90/41 - 120/58)  BP(mean): 84 (21 Jun 2020 22:00) (80 - 84)  ABP: --  ABP(mean): --  RR: 12 (21 Jun 2020 22:00) (12 - 18)  SpO2: 100% (21 Jun 2020 22:00) (95% - 100%)      NEURO  Exam: AAOx3, mild asterixis noted   ondansetron Injectable 4 milliGRAM(s) IV Push every 8 hours      RESPIRATORY  Mechanical Ventilation: on room air  Exam: CTABL       CARDIOVASCULAR  VBG - ( 21 Jun 2020 21:17 )  pH: 7.32  /  pCO2: 45    /  pO2: 51    / HCO3: 23    / Base Excess: -2.7  /  SaO2: 82     Lactate: 1.2    Exam: RRR  Cardiac Rhythm: Sinus   midodrine. 20 milliGRAM(s) Oral three times a day  tamsulosin 0.4 milliGRAM(s) Oral at bedtime      GI/NUTRITION  Exam: soft, NT, ND   Diet: NPO   lactulose Syrup 30 Gram(s) Oral every 6 hours  pantoprazole  Injectable 40 milliGRAM(s) IV Push every 12 hours      GENITOURINARY/RENAL  albumin human 25% IVPB 100 milliLiter(s) IV Intermittent every 8 hours  dextrose 5%. 1000 milliLiter(s) IV Continuous <Continuous>  folic acid 1 milliGRAM(s) Oral daily  sodium chloride 1 Gram(s) Oral every 6 hours      06-20 @ 07:01  -  06-21 @ 07:00  --------------------------------------------------------  IN:    Oral Fluid: 550 mL  Total IN: 550 mL    OUT:    Indwelling Catheter - Urethral: 1400 mL  Total OUT: 1400 mL    Total NET: -850 mL      06-21 @ 07:01  -  06-21 @ 22:57  --------------------------------------------------------  IN:    Oral Fluid: 440 mL    Packed Red Blood Cells: 300 mL  Total IN: 740 mL    OUT:    Indwelling Catheter - Urethral: 1020 mL  Total OUT: 1020 mL    Total NET: -280 mL          06-21    126<L>  |  97  |  48<H>  ----------------------------<  204<H>  5.2   |  20<L>  |  1.92<H>    Ca    10.0      21 Jun 2020 21:18  Phos  3.6     06-21  Mg     1.8     06-21    TPro  5.9<L>  /  Alb  3.7  /  TBili  7.4<H>  /  DBili  x   /  AST  22  /  ALT  13  /  AlkPhos  170<H>  06-21    [ ] Beasley catheter, indication: urine output monitoring in critically ill patient    HEMATOLOGIC  [ ] VTE Prophylaxis:                          7.7    2.55  )-----------( 35       ( 21 Jun 2020 21:18 )             22.9     PT/INR - ( 21 Jun 2020 21:18 )   PT: 29.6 sec;   INR: 2.52 ratio         PTT - ( 21 Jun 2020 21:18 )  PTT:53.0 sec  Transfusion: [ ] PRBC	[ ] Platelets	[ ] FFP	[ ] Cryoprecipitate      INFECTIOUS DISEASES  hepatitis B Vaccine - Adult (ENGERIX-B) 20 MICROGram(s) IntraMuscular once  linezolid    Tablet 600 milliGRAM(s) Oral every 12 hours  nystatin    Suspension [id number] Unit(s) Oral four times a day  rifAXIMin 550 milliGRAM(s) Oral two times a day    RECENT CULTURES:      ENDOCRINE  glucagon  Injectable 1 milliGRAM(s) IntraMuscular once PRN  insulin glargine Injectable (LANTUS) 7 Unit(s) SubCutaneous at bedtime  insulin lispro (HumaLOG) corrective regimen sliding scale   SubCutaneous three times a day before meals  insulin lispro (HumaLOG) corrective regimen sliding scale   SubCutaneous at bedtime  insulin lispro Injectable (HumaLOG) 3 Unit(s) SubCutaneous three times a day before meals    CAPILLARY BLOOD GLUCOSE      POCT Blood Glucose.: 204 mg/dL (21 Jun 2020 17:13)  POCT Blood Glucose.: 202 mg/dL (21 Jun 2020 12:35)  POCT Blood Glucose.: 197 mg/dL (21 Jun 2020 09:06)      PATIENT CARE ACCESS DEVICES:  [ x] Peripheral IV  [ ] Central Venous Line	[ ] R	[ ] L	[ ] IJ	[ ] Fem	[ ] SC	Placed:   [ ] Arterial Line		[ ] R	[ ] L	[ ] Fem	[ ] Rad	[ ] Ax	Placed:   [ ] PICC:					[ ] Mediport  [ ] Urinary Catheter, Date Placed:   [x] Necessity of urinary, arterial, and venous catheters discussed    OTHER MEDICATIONS: nystatin Cream 1 Application(s) Topical two times a day  silver sulfADIAZINE 1% Cream 1 Application(s) Topical two times a day  sodium zirconium cyclosilicate 5 Gram(s) Oral three times a day

## 2020-06-21 NOTE — CHART NOTE - NSCHARTNOTEFT_GEN_A_CORE
Pt presyncopal w/ slightly low BP from baseline when working w/ PT. Hb 7 from 7.7.     Some report of 1 black tarry stool. Pt feels better now, lying in bed, /64. Is not aware of his stool.    Rectal exam solid brown stool no melena.     Impression:  - not c/f active GI bleed at this time, pt likely orthostatic from anemia (which is due to chronic low level hemolysis from end stage liver dz)    Recs:  - okay to transfuse 1u pRBC  - monitor for signs of recurrent bleeding  - no indication to transfer to SICU for scope  - if pt develops hypotension and olga overt bleeding please inform us and contact SICU for transfer    Ze Dominguez  Gastroenterology Fellow  Available via Microsoft Teams  Call [phone] ([facility]) or Page [phone] ([facility]) from 8am-5pm M-F  Please page on-call GI fellow via the  at night and on weekends

## 2020-06-21 NOTE — PROGRESS NOTE ADULT - PROBLEM SELECTOR PLAN 7
Insulin dependent DM. A1c 5.4.  - c/w home regimen: Lantus 4units QHS, humalog 3 un qAc
Insulin dependent DM. A1c 5.4.  - c/w home regimen: Lantus 7units QHS, humalog 3 with meals.
Unclear etiology EMR reviewed:   -Chronic slow bleed 2/2 known AVM/duodenal GAVE vs mild iron deficiency/spur cell anemia in setting on decompensated cirrhosis; also concern for MGUS and Gilbert? given unconjugated bilirubin. Was supposed to have an EGD to eval source of bleeding but was deferred due to COVID  -Present with worsening anemia but asymptomatic   -Trend CBC  -Currently with brown stool. Low suspicion for acute gib.
Unclear etiology- Chronic slow bleed 2/2 known AVM/duodenal GAVE vs mild iron deficiency/spur cell anemia in setting on decompensated cirrhosis;   - continue to monitor closely
Unclear etiology- Chronic slow bleed 2/2 known AVM/duodenal GAVE vs mild iron deficiency/spur cell anemia in setting on decompensated cirrhosis;   - continue to monitor closely
Unclear etiology- Chronic slow bleed 2/2 known AVM/duodenal GAVE vs mild iron deficiency/spur cell anemia in setting on decompensated cirrhosis;   Hb 6.9 yesterday, transfused 1 unit w appropriate response.
Unclear etiology- Chronic slow bleed 2/2 known AVM/duodenal GAVE vs mild iron deficiency/spur cell anemia in setting on decompensated cirrhosis; also concern for MGUS and Gilbert? given unconjugated bilirubin. Was supposed to have an EGD to eval source of bleeding but was deferred due to COVID.     Hb 6.9 today, transfuse 1 unit.

## 2020-06-21 NOTE — CONSULT NOTE ADULT - ATTENDING COMMENTS
Agree with above. Patient with a complex medical history now awaiting possible liver transplant had an episode of orthostatic hypotension this AM while working with PT. He is also noted to have a hemoglobin slightly lower than baseline with reported black tarry stool (though patient unaware of this).    Would suggest evaluation by GI and Hepatology who both appear to know the patient well. If there is truly concern for GI bleeding would suggest making patient NPO, starting protonix gtt, and consider further endoscopic evaluation given extensive GI history. Patient has a known chronic anemia in the setting of chronic GI blood loss and spur cell anemia. Would suggest transfusion of 1 unit PRBC. Monitor thrombocytopenia and INR and correct as needed.    Patient is presently hemodynamically stable and does not require vasopressor support. Patient was seen in bed, talking comfortably, and eating lunch.    At this time patient does NOT require MICU level care. Please reconsult should his clinical status change.

## 2020-06-21 NOTE — CHART NOTE - NSCHARTNOTEFT_GEN_A_CORE
Pt seen and evaluated by myself. In bed lying c/o some dizziness when sitting up.  Earlier today Pt had episode of hypotension while working w/ PT 90s/40s which reversed when returned to bed. MICU consulted, no acute need for ICU at that time.  Pt also received 1 unit PRBC for downtrending h/h. 7/20.8 <- 7.7/22.5.   Pt states he just doesn't feel right.  Pt also mildly encephalopathic- misspelled world backwards and missed 1 month when reciting backwards.  VSS at this time. MELD- Na 34 today.  Case discussed with Transplant surgery attending and SICU consult called for hemodynamic and neuro monitoring.  Will monitor closely.

## 2020-06-21 NOTE — PROGRESS NOTE ADULT - ASSESSMENT
64M w/ HFpEF, decompensated JEAN cirrhosis w/ hx of ascites + SBP, GI bleed 2/2 duodenal ulcers, angioectasias, and GAVE, ESBL E. Coli prostatic abscess s/p 4 weeks IV ertapenem, hx of ESBL UTIs and VRE colonization, presenting w/ jaundice and confusion, likely 2/2 HE and infection.    Impression:  #Cirrhosis 2/2 JEAN, decompensated by ascites; listed for liver transplant  -varices: small on last EGD 12/2019, not on BB  -ascites: trace on U/S this admission  -HCC: neg on U/S this admission  -HE: present on exam, on lactulose + rifaximin at home, improving w/ lactulose and tx of infection  -MELD-Na 34 on 6/21; listed at 35 from 6/18  #Anemia w/o overt GI bleeding, Hb down to 6.5, likely due to low level hemolysis from end stage cirrhosis  #R posterior heel wound w/ overlying eschar – no signs of infections, XR neg for gas, evaluated by podiatry and ID, awaiting MRI w/ IV contrast  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 per ID    Recommendation:  - continue to trend Hb daily, transfuse to Hb > 7, no concern for active GI bleeding at this time  - recommend d/c bob  - daily PT if possible, OOBTC as much as possible  - c/w lactulose titrated to 3-4 BMs per day  - c/w miralax  - c/w rifaximin  - f/u transplant ID recs re: antibiotics, agree w/ 7d of linezolid ending 6/21)  - trend CMP, CBC, INR daily  - f/u renal recs re: SIADH, recommend 1.5L fluid restriction and protein supplementation for now  - continue home midodrine therapy  - low Na diet  - rest of care per primary team  - hepatology will follow    Ze Dominguez  Gastroenterology Fellow  Available via Microsoft Teams  Call [phone] ([facility]) or Page [phone] ([facility]) from 8am-5pm M-F  Please page on-call GI fellow via the  at night and on weekends

## 2020-06-21 NOTE — PROGRESS NOTE ADULT - ASSESSMENT
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    JEAN Cirrhosis/Hepatic encephalopathy/VRE UTI  - VRE UTI, colonization. On 7-day course of linezolid (6/15-21). Blood cultures negative. Continue Bactrim for UTI PPx (previous recurrent ESBL E coli infection)   - HE -continue rifaximin, Miralax, and lactulose   - Right heel wound- no signs of infections. XR neg for gas/OM; Foot MRI neg for OM.  - UNOS waitlisted for Liver Transplant.  MELD 34 today    Blood type A negative   - Daily CBC, CMP, INR  - Transplant surgery will continue to follow closely    Please page [phone] with any questions

## 2020-06-21 NOTE — PROGRESS NOTE ADULT - PROBLEM SELECTOR PLAN 3
Reported confusion and altered behavior at home  - mental status has been fluctuating.   - AXR negative for illeus/obstruction  - continue lactulose 30 q6hr. Having 4 BMs a day. However also on Miralax.   - continue rifaximin 550 BID  - monitor mental status closely  - Rx UTI as above.   - Heel does not appear infected.

## 2020-06-21 NOTE — PROGRESS NOTE ADULT - PROBLEM SELECTOR PLAN 8
Nystatin swish and swallow
R posterior heel wound to subQ w/ overlying eschar, stable w/ no signs of infection  - MRI foot negative for OM  - XR negative for gas or OM, MRI foot last admission negative for OM  - evaluated by podiatry
R posterior heel wound to subQ w/ overlying eschar, stable w/ no signs of infection  - MRI foot negative for OM  - XR negative for gas or OM, MRI foot last admission negative for OM  - evaluated by podiatry
R posterior heel wound to subQ w/ overlying eschar, stable w/ no signs of infection  - MRI foot negative for OM  - XR negative for gas or OM, MRI foot last admission negative for OM  - evaluated by podiatry. Recs appreciated.
R posterior heel wound to subQ w/ overlying eschar, stable w/ no signs of infection - XR negative for gas or OM   - evaluated by podiatry
R posterior heel wound to subQ w/ overlying eschar, stable w/ no signs of infection - XR negative for gas or OM, MRI foot last admission negative for OM  - evaluated by podiatry
# R posterior heel wound to subQ w/ overlying eschar, stable w/ no signs of infection -XR negative for gas or OM ( evaluated by podiatry)
Nystatin swish and swallow

## 2020-06-21 NOTE — CHART NOTE - NSCHARTNOTEFT_GEN_A_CORE
Patient seen in bed and non toxic looking, talking on the phone.  Pt with history of cirrhosis and duodenal ulcer/AVM. He was admitted for AMS and jaundice, , Patient has a history of  ESBL E. Coli prostatic abscess s/p 4 weeks IV ertapenem, hx of ESBL UTIs and VRE colonization, now presumably treated for VRE with zyvox. Patient now with low hgb 7, yesterday was 7.7  Vital Signs Last 24 Hrs  T(C): 36.7 (21 Jun 2020 08:40), Max: 36.8 (20 Jun 2020 20:18)  T(F): 98.1 (21 Jun 2020 08:40), Max: 98.2 (20 Jun 2020 20:18)  HR: 80 (21 Jun 2020 08:40) (66 - 87)  BP: 99/59 (21 Jun 2020 08:40) (99/59 - 119/66)  BP(mean): --  RR: 18 (21 Jun 2020 08:40) (18 - 18)  SpO2: 95% (21 Jun 2020 08:40) (95% - 98%)  Comprehensive Metabolic Panel in AM (06.21.20 @ 09:30)    Sodium, Serum: 128 mmol/L    Potassium, Serum: 5.4 mmol/L    Chloride, Serum: 97 mmol/L    Carbon Dioxide, Serum: 20 mmol/L    Anion Gap, Serum: 11 mmol/L    Blood Urea Nitrogen, Serum: 49 mg/dL    Creatinine, Serum: 1.90 mg/dL    Glucose, Serum: 178 mg/dL    Calcium, Total Serum: 10.0 mg/dL    Protein Total, Serum: 5.5 g/dL    Albumin, Serum: 3.7 g/dL    Bilirubin Total, Serum: 7.4 mg/dL    Alkaline Phosphatase, Serum: 161 U/L    Aspartate Aminotransferase (AST/SGOT): 21 U/L    Alanine Aminotransferase (ALT/SGPT): 10 U/L                          7.0    2.72  )-----------( 36       ( 21 Jun 2020 09:30 )             20.8   A/P 64 yrs old male with JEAN cirrhosis decompensated now with acute blood loss  Pt with no overt bleeding  repeat cbc and obtain a type and screen  d/w dr sawyer  GI follow up Patient seen in bed and non toxic looking, talking on the phone.  Pt with history of cirrhosis and duodenal ulcer/AVM. He was admitted for AMS and jaundice, , Patient has a history of  ESBL E. Coli prostatic abscess s/p 4 weeks IV ertapenem, hx of ESBL UTIs and VRE colonization, now presumably treated for VRE with zyvox. Patient now with low hgb 7, yesterday was 7.7  PE : patient seen in bed awake and alert, feels weak.  Physical therapist reported patient became semi unrepsonsive when attempted to get patient OOB, was immediately brought back to bed and became responsive. As Per PCA pt with tarry stool.    Vital Signs Last 24 Hrs  T(C): 36.7 (21 Jun 2020 08:40), Max: 36.8 (20 Jun 2020 20:18)  T(F): 98.1 (21 Jun 2020 08:40), Max: 98.2 (20 Jun 2020 20:18)  HR: 80 (21 Jun 2020 08:40) (66 - 87)  BP: 99/59 (21 Jun 2020 08:40) (99/59 - 119/66)  BP(mean): --  RR: 18 (21 Jun 2020 08:40) (18 - 18)  SpO2: 95% (21 Jun 2020 08:40) (95% - 98%)  Comprehensive Metabolic Panel in AM (06.21.20 @ 09:30)    Sodium, Serum: 128 mmol/L    Potassium, Serum: 5.4 mmol/L    Chloride, Serum: 97 mmol/L    Carbon Dioxide, Serum: 20 mmol/L    Anion Gap, Serum: 11 mmol/L    Blood Urea Nitrogen, Serum: 49 mg/dL    Creatinine, Serum: 1.90 mg/dL    Glucose, Serum: 178 mg/dL    Calcium, Total Serum: 10.0 mg/dL    Protein Total, Serum: 5.5 g/dL    Albumin, Serum: 3.7 g/dL    Bilirubin Total, Serum: 7.4 mg/dL    Alkaline Phosphatase, Serum: 161 U/L    Aspartate Aminotransferase (AST/SGOT): 21 U/L    Alanine Aminotransferase (ALT/SGPT): 10 U/L                          7.0    2.72  )-----------( 36       ( 21 Jun 2020 09:30 )             20.8   A/P 64 yrs old male with JEAN cirrhosis decompensated now with acute blood loss  Acute GIB with reported black tarry stool  CBC q6 hours  PRBC x1 stat  Active type and screen  2 bore needles  Bedrest  vs q4   MICU eval  d/w dr sawyer  GI follow up Patient seen in bed and non toxic looking, talking on the phone.  Pt with history of cirrhosis and duodenal ulcer/AVM. He was admitted for AMS and jaundice, , Patient has a history of  ESBL E. Coli prostatic abscess s/p 4 weeks IV ertapenem, hx of ESBL UTIs and VRE colonization, now presumably treated for VRE with zyvox. Patient now with low hgb 7, yesterday was 7.7  PE : patient seen in bed awake and alert, feels weak.  Physical therapist reported patient became semi unrepsonsive when attempted to get patient OOB, was immediately brought back to bed and became responsive. As Per PCA pt with tarry stool.    Vital Signs Last 24 Hrs  T(C): 36.7 (21 Jun 2020 08:40), Max: 36.8 (20 Jun 2020 20:18)  T(F): 98.1 (21 Jun 2020 08:40), Max: 98.2 (20 Jun 2020 20:18)  HR: 80 (21 Jun 2020 08:40) (66 - 87)  BP: 99/59 (21 Jun 2020 08:40) (99/59 - 119/66)  BP(mean): --  RR: 18 (21 Jun 2020 08:40) (18 - 18)  SpO2: 95% (21 Jun 2020 08:40) (95% - 98%)  Comprehensive Metabolic Panel in AM (06.21.20 @ 09:30)    Sodium, Serum: 128 mmol/L    Potassium, Serum: 5.4 mmol/L    Chloride, Serum: 97 mmol/L    Carbon Dioxide, Serum: 20 mmol/L    Anion Gap, Serum: 11 mmol/L    Blood Urea Nitrogen, Serum: 49 mg/dL    Creatinine, Serum: 1.90 mg/dL    Glucose, Serum: 178 mg/dL    Calcium, Total Serum: 10.0 mg/dL    Protein Total, Serum: 5.5 g/dL    Albumin, Serum: 3.7 g/dL    Bilirubin Total, Serum: 7.4 mg/dL    Alkaline Phosphatase, Serum: 161 U/L    Aspartate Aminotransferase (AST/SGOT): 21 U/L    Alanine Aminotransferase (ALT/SGPT): 10 U/L                          7.0    2.72  )-----------( 36       ( 21 Jun 2020 09:30 )             20.8   A/P 64 yrs old male with JEAN cirrhosis decompensated now with acute blood loss  Acute GIB with reported black tarry stool  CBC q6 hours  PRBC x1 stat and c/w PPI  Active type and screen  2 bore needles  Bedrest  vs q4   MICU eval  d/w dr sawyer  GI follow up

## 2020-06-21 NOTE — CONSULT NOTE ADULT - ASSESSMENT
-63 y/o with IDDM, dyslipidemia, obesity, GERD, HFpEF with mild LV diastolic dysfunction, MGUS, and decompensated JEAN cirrhosis presenting with AMS, found to have VRE UTI     Neuro:   - AAOx3, mild asterixis   - monitor mental status   - no Tylenol, narcotics, or benzos    Resp:   - no active issues  - on room air saturating well     Cardiac:   - monitor vital signs   - c/w midodrine     GI:   - CLD   - c/w lactulose   - c/w ppx   - monitor LFTs  - monitor ammonia     Renal:   - monitor Is and Os   - monitor electrolytes and replete   -c./w salt tabs     Heme:   - monitor CBC  - hold DVT ppx   - monitor INR   -s/p 2u pRBCs    ID:   - c/w linezolid for VRE   - c/w rifaximin   - holding home bacterium bc on linezolid     Endo:  - monitor FSG   - ISS and lantus

## 2020-06-21 NOTE — PROGRESS NOTE ADULT - PROBLEM SELECTOR PROBLEM 9
GERD with esophagitis
Need for prophylactic measure
GERD with esophagitis

## 2020-06-21 NOTE — CONSULT NOTE ADULT - SUBJECTIVE AND OBJECTIVE BOX
CHIEF COMPLAINT: dizziness      HPI:  65 yo M PMHx of decompensated JEAN cirrhosis c/b small esophageal varices (12/2019), portal hypertensive gastropathy, ascites, hx SBP, hx hepatic encephalopathy, GAVE syndrome s/p APC, hx duodenal ulcer (5/2019), HTN, HLD, DM, HFpEF (EF 70%), recent ESBL E coli prostate abscess (3/2020 s/p 4 weeks ertapenem) presents with reports of confusion. Saw his hepatologist yesterday was noted to be more jaundiced and more confused than normal. Per wife his was not oriented to time and place, and forgot how many grandchildren he had. He also urinated in his bedroom. He was confused for roughly 12 hours. He is now back to his baseline mental status. He denies fever, chills, dysuria, increased urinary frequency, flank pain, neck stiffness. No melena, hematochezia, hematemesis. (11 Jun 2020 13:53)    Hospital course: Pt had black tarry BM this AM, first bloody BM this admission Hg dropped to 7 from 7.7. Pt worked with PT and was sat up in bed. He became dizzy and confused, BP measured to be 90/41, then improved to 119/64 when placed supine again. Pt remembers the whole event. He currently is at baseline mental status and eating lunch. Denies CP, SOB, abd pain, N/V/D. EGD 12/2019 showed small nonbleeding esophageal varices, portal hypertensive gastropathy, and GAVE s/p APC. MICU consulted for GIB.      PAST MEDICAL & SURGICAL HISTORY:  GIB (gastrointestinal bleeding)  GERD with esophagitis: Gastritis &amp; Non Bleeding Ulcers  Hepatic encephalopathy  Obesity  Fatty liver disease, nonalcoholic  Renal stones: 25 years ago  Hypertension  Neuropathy  Hypercholesteremia  Diabetes  S/P cholecystectomy      FAMILY HISTORY:  Family history of type 2 diabetes mellitus  Family history of hypertension  Family history of stomach cancer      SOCIAL HISTORY:  Smoking: [ ] Never Smoked [ ] Former Smoker (__ packs x ___ years) [ ] Current Smoker  (__ packs x ___ years)  Substance Use: [ ] Never Used [ ] Used ____  EtOH Use:  Marital Status: [ ] Single [ ]  [ ]  [ ]   Sexual History:   Occupation:  Recent Travel:  Country of Birth:  Advance Directives:    Allergies    codeine (Anaphylaxis)    Intolerances        HOME MEDICATIONS:    REVIEW OF SYSTEMS:  Constitutional: [x ] negative [ ] fevers [ ] chills [ ] weight loss [ ] weight gain  HEENT: [x ] negative [ ] dry eyes [ ] eye irritation [ ] postnasal drip [ ] nasal congestion  CV: [ x] negative  [ ] chest pain [ ] orthopnea [ ] palpitations [ ] murmur  Resp: [x ] negative [ ] cough [ ] shortness of breath [ ] dyspnea [ ] wheezing [ ] sputum [ ] hemoptysis  GI: [x ] negative [ ] nausea [ ] vomiting [ ] diarrhea [ ] constipation [ ] abd pain [ ] dysphagia   : [ ] negative [ ] dysuria [ ] nocturia [ ] hematuria [ ] increased urinary frequency, +melena  Musculoskeletal: [x ] negative [ ] back pain [ ] myalgias [ ] arthralgias [ ] fracture  Skin: [x ] negative [ ] rash [ ] itch  Neurological: [ ] negative [ ] headache [x ] dizziness [ ] syncope [ ] weakness [ ] numbness  Psychiatric: [x ] negative [ ] anxiety [ ] depression  Endocrine: [ x] negative [ ] diabetes [ ] thyroid problem  Hematologic/Lymphatic: [x ] negative [ ] anemia [ ] bleeding problem  Allergic/Immunologic: [x ] negative [ ] itchy eyes [ ] nasal discharge [ ] hives [ ] angioedema  [ x] All other systems negative  [ ] Unable to assess ROS because ________    OBJECTIVE:  ICU Vital Signs Last 24 Hrs  T(C): 36.7 (21 Jun 2020 08:40), Max: 36.8 (20 Jun 2020 20:18)  T(F): 98.1 (21 Jun 2020 08:40), Max: 98.2 (20 Jun 2020 20:18)  HR: 80 (21 Jun 2020 08:40) (66 - 87)  BP: 119/64 (21 Jun 2020 11:34) (90/41 - 119/66)  BP(mean): --  ABP: --  ABP(mean): --  RR: 18 (21 Jun 2020 08:40) (18 - 18)  SpO2: 95% (21 Jun 2020 08:40) (95% - 98%)        06-20 @ 07:01  -  06-21 @ 07:00  --------------------------------------------------------  IN: 550 mL / OUT: 1400 mL / NET: -850 mL    06-21 @ 07:01  -  06-21 @ 13:04  --------------------------------------------------------  IN: 320 mL / OUT: 400 mL / NET: -80 mL      CAPILLARY BLOOD GLUCOSE      POCT Blood Glucose.: 202 mg/dL (21 Jun 2020 12:35)      PHYSICAL EXAM:  General: WN/WD NAD  Neurology: A&Ox3, nonfocal, TORREZ x 4, no asterixis, answers questions appropriately  Eyes: PERRLA, Gross vision intact  HEENT: Neck supple, trachea midline, No JVD, Gross hearing intact  Respiratory: CTA B/L, No wheezing, rales, rhonchi, on RA  CV: RRR, S1S2, +systolic murmurs, rubs or gallops  Abdominal: Soft, NT, ND +BS, no fluid shift  Extremities: No edema, + peripheral pulses  Skin: No Rashes, Hematoma, Ecchymosis, +jaundice    Lines/drains/airway: 1 PIV      HOSPITAL MEDICATIONS:  MEDICATIONS  (STANDING):  dextrose 5%. 1000 milliLiter(s) (50 mL/Hr) IV Continuous <Continuous>  dextrose 50% Injectable 12.5 Gram(s) IV Push once  dextrose 50% Injectable 25 Gram(s) IV Push once  dextrose 50% Injectable 25 Gram(s) IV Push once  folic acid 1 milliGRAM(s) Oral daily  hepatitis B Vaccine - Adult (ENGERIX-B) 20 MICROGram(s) IntraMuscular once  insulin glargine Injectable (LANTUS) 7 Unit(s) SubCutaneous at bedtime  insulin lispro (HumaLOG) corrective regimen sliding scale   SubCutaneous three times a day before meals  insulin lispro (HumaLOG) corrective regimen sliding scale   SubCutaneous at bedtime  insulin lispro Injectable (HumaLOG) 3 Unit(s) SubCutaneous three times a day before meals  lactulose Syrup 30 Gram(s) Oral every 6 hours  linezolid    Tablet 600 milliGRAM(s) Oral every 12 hours  midodrine. 20 milliGRAM(s) Oral three times a day  nystatin    Suspension 342239 Unit(s) Oral four times a day  nystatin Cream 1 Application(s) Topical two times a day  ondansetron Injectable 4 milliGRAM(s) IV Push every 8 hours  pantoprazole    Tablet 40 milliGRAM(s) Oral two times a day  polyethylene glycol 3350 17 Gram(s) Oral daily  rifAXIMin 550 milliGRAM(s) Oral two times a day  silver sulfADIAZINE 1% Cream 1 Application(s) Topical two times a day  sodium zirconium cyclosilicate 5 Gram(s) Oral three times a day  tamsulosin 0.4 milliGRAM(s) Oral at bedtime  trimethoprim  160 mG/sulfamethoxazole 800 mG 1 Tablet(s) Oral daily    MEDICATIONS  (PRN):  acetaminophen   Tablet .. 650 milliGRAM(s) Oral every 8 hours PRN Moderate Pain (4 - 6)  dextrose 40% Gel 15 Gram(s) Oral once PRN Blood Glucose LESS THAN 70 milliGRAM(s)/deciliter  glucagon  Injectable 1 milliGRAM(s) IntraMuscular once PRN Glucose LESS THAN 70 milligrams/deciliter      LABS:                        7.0    2.72  )-----------( 36       ( 21 Jun 2020 09:30 )             20.8     Hgb Trend: 7.0<--, 7.7<--, 8.4<--, 7.6<--, 7.9<--  06-21    128<L>  |  97  |  49<H>  ----------------------------<  178<H>  5.4<H>   |  20<L>  |  1.90<H>    Ca    10.0      21 Jun 2020 09:30  Phos  3.6     06-21  Mg     1.8     06-21    TPro  5.5<L>  /  Alb  3.7  /  TBili  7.4<H>  /  DBili  x   /  AST  21  /  ALT  10  /  AlkPhos  161<H>  06-21    Creatinine Trend: 1.90<--, 1.84<--, 1.63<--, 1.71<--, 2.13<--, 1.72<--  PT/INR - ( 21 Jun 2020 09:30 )   PT: 30.8 sec;   INR: 2.62 ratio         MICROBIOLOGY: VRE E faecium UTI    RADIOLOGY:  [ ] Reviewed and interpreted by me  < from: Upper Endoscopy (12.30.19 @ 08:09) >  Impression:          - Non-bleeding small (< 5 mm) esophageal varices, not amendable to banding.                       - Portal hypertensive gastropathy.                       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 Treated with argon plasma                        coagulation (APC).                       - Normal examined duodenum.                       - No specimens collected.  Recommendation:      - [facility].                       - Resume regular diet.                       - Follow up with transplant hepatology  < end of copied text >      < from: MR Ankle w/wo IV Cont, Right (06.18.20 @ 23:04) >  Findings:   Evaluation for soft tissue infection is limited without intravenous contrast. Although there is heterogeneous marrow signal there is no skin ulceration, sinus tract or abscess identified with adjacent marrow signal abnormality suggest osteomyelitis.     There is edema within the abductor hallucis and flexor digitorum brevis muscles. There is minimal edema and marked atrophy within the adductor digit minimi muscle. These findings are likely related to denervation edema. The tendons at the ankle and hindfoot are intact. There is mild flexor hallucis longus tenosynovitis. There is mild spurring at the Achilles insertion. There is marked thickening at the middle cord of the plantar fascia which contains a small ossicle which is chronic. There is also focal thickening at the proximal lateral cortex of the plantar fascia likely related to old injury.     There is marked spurring dorsally at the second tarsometatarsal joint. There is edema within the sinus Tarsi with scarring.     The Lisfranc ligament is at the edge of the field although the interosseous ligament is grossly intact. The dorsal component is markedly thickened and chronically degenerated or torn. The distal syndesmotic ligaments are intact. The lateral collateral ligaments are intact. There is thickening at the deltoid ligament which is continuous.    Impression: Evaluation for soft tissue infection is limited without intravenous contrast. Heterogeneous marrow signal without an adjacent skin ulceration or fluid collection to suggest osteomyelitis.    Degenerative changes and denervation edema as above.  < end of copied text >      EKG: CHIEF COMPLAINT: dizziness      HPI:  65 yo M PMHx of decompensated JEAN cirrhosis c/b small esophageal varices (12/2019), portal hypertensive gastropathy, ascites, hx SBP, hx hepatic encephalopathy, GAVE syndrome s/p APC, hx duodenal ulcer (5/2019), HTN, HLD, DM, HFpEF (EF 70%), recent ESBL E coli prostate abscess (3/2020 s/p 4 weeks ertapenem), orthostatic hypotension 2/2 midodrine presents with reports of confusion. Saw his hepatologist yesterday was noted to be more jaundiced and more confused than normal. Per wife his was not oriented to time and place, and forgot how many grandchildren he had. He also urinated in his bedroom. He was confused for roughly 12 hours. He is now back to his baseline mental status. He denies fever, chills, dysuria, increased urinary frequency, flank pain, neck stiffness. No melena, hematochezia, hematemesis. (11 Jun 2020 13:53)    Hospital course: Pt had black tarry BM this AM, first bloody BM this admission Hg dropped to 7 from 7.7. Pt worked with PT and was sat up in bed. He became dizzy and confused, BP measured to be 90/41, then improved to 119/64 when placed supine again. Pt remembers the whole event. He currently is at baseline mental status and eating lunch. Denies CP, SOB, abd pain, N/V/D. EGD 12/2019 showed small nonbleeding esophageal varices, portal hypertensive gastropathy, and GAVE s/p APC. MICU consulted for GIB.      PAST MEDICAL & SURGICAL HISTORY:  GIB (gastrointestinal bleeding)  GERD with esophagitis: Gastritis &amp; Non Bleeding Ulcers  Hepatic encephalopathy  Obesity  Fatty liver disease, nonalcoholic  Renal stones: 25 years ago  Hypertension  Neuropathy  Hypercholesteremia  Diabetes  S/P cholecystectomy      FAMILY HISTORY:  Family history of type 2 diabetes mellitus  Family history of hypertension  Family history of stomach cancer      SOCIAL HISTORY:  Smoking: [ ] Never Smoked [ ] Former Smoker (__ packs x ___ years) [ ] Current Smoker  (__ packs x ___ years)  Substance Use: [ ] Never Used [ ] Used ____  EtOH Use:  Marital Status: [ ] Single [ ]  [ ]  [ ]   Sexual History:   Occupation:  Recent Travel:  Country of Birth:  Advance Directives:    Allergies    codeine (Anaphylaxis)    Intolerances        HOME MEDICATIONS:    REVIEW OF SYSTEMS:  Constitutional: [x ] negative [ ] fevers [ ] chills [ ] weight loss [ ] weight gain  HEENT: [x ] negative [ ] dry eyes [ ] eye irritation [ ] postnasal drip [ ] nasal congestion  CV: [ x] negative  [ ] chest pain [ ] orthopnea [ ] palpitations [ ] murmur  Resp: [x ] negative [ ] cough [ ] shortness of breath [ ] dyspnea [ ] wheezing [ ] sputum [ ] hemoptysis  GI: [x ] negative [ ] nausea [ ] vomiting [ ] diarrhea [ ] constipation [ ] abd pain [ ] dysphagia   : [ ] negative [ ] dysuria [ ] nocturia [ ] hematuria [ ] increased urinary frequency, +melena  Musculoskeletal: [x ] negative [ ] back pain [ ] myalgias [ ] arthralgias [ ] fracture  Skin: [x ] negative [ ] rash [ ] itch  Neurological: [ ] negative [ ] headache [x ] dizziness [ ] syncope [ ] weakness [ ] numbness  Psychiatric: [x ] negative [ ] anxiety [ ] depression  Endocrine: [ x] negative [ ] diabetes [ ] thyroid problem  Hematologic/Lymphatic: [x ] negative [ ] anemia [ ] bleeding problem  Allergic/Immunologic: [x ] negative [ ] itchy eyes [ ] nasal discharge [ ] hives [ ] angioedema  [ x] All other systems negative  [ ] Unable to assess ROS because ________    OBJECTIVE:  ICU Vital Signs Last 24 Hrs  T(C): 36.7 (21 Jun 2020 08:40), Max: 36.8 (20 Jun 2020 20:18)  T(F): 98.1 (21 Jun 2020 08:40), Max: 98.2 (20 Jun 2020 20:18)  HR: 80 (21 Jun 2020 08:40) (66 - 87)  BP: 119/64 (21 Jun 2020 11:34) (90/41 - 119/66)  BP(mean): --  ABP: --  ABP(mean): --  RR: 18 (21 Jun 2020 08:40) (18 - 18)  SpO2: 95% (21 Jun 2020 08:40) (95% - 98%)        06-20 @ 07:01  -  06-21 @ 07:00  --------------------------------------------------------  IN: 550 mL / OUT: 1400 mL / NET: -850 mL    06-21 @ 07:01  - 06-21 @ 13:04  --------------------------------------------------------  IN: 320 mL / OUT: 400 mL / NET: -80 mL      CAPILLARY BLOOD GLUCOSE      POCT Blood Glucose.: 202 mg/dL (21 Jun 2020 12:35)      PHYSICAL EXAM:  General: WN/WD NAD  Neurology: A&Ox3, nonfocal, TORREZ x 4, no asterixis, answers questions appropriately  Eyes: PERRLA, Gross vision intact  HEENT: Neck supple, trachea midline, No JVD, Gross hearing intact  Respiratory: CTA B/L, No wheezing, rales, rhonchi, on RA  CV: RRR, S1S2, +systolic murmurs, rubs or gallops  Abdominal: Soft, NT, ND +BS, no fluid shift  Extremities: No edema, + peripheral pulses  Skin: No Rashes, Hematoma, Ecchymosis, +jaundice    Lines/drains/airway: 1 PIV      HOSPITAL MEDICATIONS:  MEDICATIONS  (STANDING):  dextrose 5%. 1000 milliLiter(s) (50 mL/Hr) IV Continuous <Continuous>  dextrose 50% Injectable 12.5 Gram(s) IV Push once  dextrose 50% Injectable 25 Gram(s) IV Push once  dextrose 50% Injectable 25 Gram(s) IV Push once  folic acid 1 milliGRAM(s) Oral daily  hepatitis B Vaccine - Adult (ENGERIX-B) 20 MICROGram(s) IntraMuscular once  insulin glargine Injectable (LANTUS) 7 Unit(s) SubCutaneous at bedtime  insulin lispro (HumaLOG) corrective regimen sliding scale   SubCutaneous three times a day before meals  insulin lispro (HumaLOG) corrective regimen sliding scale   SubCutaneous at bedtime  insulin lispro Injectable (HumaLOG) 3 Unit(s) SubCutaneous three times a day before meals  lactulose Syrup 30 Gram(s) Oral every 6 hours  linezolid    Tablet 600 milliGRAM(s) Oral every 12 hours  midodrine. 20 milliGRAM(s) Oral three times a day  nystatin    Suspension 620596 Unit(s) Oral four times a day  nystatin Cream 1 Application(s) Topical two times a day  ondansetron Injectable 4 milliGRAM(s) IV Push every 8 hours  pantoprazole    Tablet 40 milliGRAM(s) Oral two times a day  polyethylene glycol 3350 17 Gram(s) Oral daily  rifAXIMin 550 milliGRAM(s) Oral two times a day  silver sulfADIAZINE 1% Cream 1 Application(s) Topical two times a day  sodium zirconium cyclosilicate 5 Gram(s) Oral three times a day  tamsulosin 0.4 milliGRAM(s) Oral at bedtime  trimethoprim  160 mG/sulfamethoxazole 800 mG 1 Tablet(s) Oral daily    MEDICATIONS  (PRN):  acetaminophen   Tablet .. 650 milliGRAM(s) Oral every 8 hours PRN Moderate Pain (4 - 6)  dextrose 40% Gel 15 Gram(s) Oral once PRN Blood Glucose LESS THAN 70 milliGRAM(s)/deciliter  glucagon  Injectable 1 milliGRAM(s) IntraMuscular once PRN Glucose LESS THAN 70 milligrams/deciliter      LABS:                        7.0    2.72  )-----------( 36       ( 21 Jun 2020 09:30 )             20.8     Hgb Trend: 7.0<--, 7.7<--, 8.4<--, 7.6<--, 7.9<--  06-21    128<L>  |  97  |  49<H>  ----------------------------<  178<H>  5.4<H>   |  20<L>  |  1.90<H>    Ca    10.0      21 Jun 2020 09:30  Phos  3.6     06-21  Mg     1.8     06-21    TPro  5.5<L>  /  Alb  3.7  /  TBili  7.4<H>  /  DBili  x   /  AST  21  /  ALT  10  /  AlkPhos  161<H>  06-21    Creatinine Trend: 1.90<--, 1.84<--, 1.63<--, 1.71<--, 2.13<--, 1.72<--  PT/INR - ( 21 Jun 2020 09:30 )   PT: 30.8 sec;   INR: 2.62 ratio         MICROBIOLOGY: VRE E faecium UTI    RADIOLOGY:  [ ] Reviewed and interpreted by me  < from: Upper Endoscopy (12.30.19 @ 08:09) >  Impression:          - Non-bleeding small (< 5 mm) esophageal varices, not amendable to banding.                       - Portal hypertensive gastropathy.                       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 Treated with argon plasma                        coagulation (APC).                       - Normal examined duodenum.                       - No specimens collected.  Recommendation:      - [facility].                       - Resume regular diet.                       - Follow up with transplant hepatology  < end of copied text >      < from: MR Ankle w/wo IV Cont, Right (06.18.20 @ 23:04) >  Findings:   Evaluation for soft tissue infection is limited without intravenous contrast. Although there is heterogeneous marrow signal there is no skin ulceration, sinus tract or abscess identified with adjacent marrow signal abnormality suggest osteomyelitis.     There is edema within the abductor hallucis and flexor digitorum brevis muscles. There is minimal edema and marked atrophy within the adductor digit minimi muscle. These findings are likely related to denervation edema. The tendons at the ankle and hindfoot are intact. There is mild flexor hallucis longus tenosynovitis. There is mild spurring at the Achilles insertion. There is marked thickening at the middle cord of the plantar fascia which contains a small ossicle which is chronic. There is also focal thickening at the proximal lateral cortex of the plantar fascia likely related to old injury.     There is marked spurring dorsally at the second tarsometatarsal joint. There is edema within the sinus Tarsi with scarring.     The Lisfranc ligament is at the edge of the field although the interosseous ligament is grossly intact. The dorsal component is markedly thickened and chronically degenerated or torn. The distal syndesmotic ligaments are intact. The lateral collateral ligaments are intact. There is thickening at the deltoid ligament which is continuous.    Impression: Evaluation for soft tissue infection is limited without intravenous contrast. Heterogeneous marrow signal without an adjacent skin ulceration or fluid collection to suggest osteomyelitis.    Degenerative changes and denervation edema as above.  < end of copied text >      EKG:

## 2020-06-22 DIAGNOSIS — K72.90 HEPATIC FAILURE, UNSPECIFIED WITHOUT COMA: ICD-10-CM

## 2020-06-22 DIAGNOSIS — E87.1 HYPO-OSMOLALITY AND HYPONATREMIA: ICD-10-CM

## 2020-06-22 DIAGNOSIS — D64.9 ANEMIA, UNSPECIFIED: ICD-10-CM

## 2020-06-22 LAB
ALBUMIN SERPL ELPH-MCNC: 4 G/DL — SIGNIFICANT CHANGE UP (ref 3.3–5)
ALBUMIN SERPL ELPH-MCNC: 4.1 G/DL — SIGNIFICANT CHANGE UP (ref 3.3–5)
ALP SERPL-CCNC: 125 U/L — HIGH (ref 40–120)
ALP SERPL-CCNC: 135 U/L — HIGH (ref 40–120)
ALP SERPL-CCNC: 137 U/L — HIGH (ref 40–120)
ALP SERPL-CCNC: 155 U/L — HIGH (ref 40–120)
ALP SERPL-CCNC: 157 U/L — HIGH (ref 40–120)
ALT FLD-CCNC: 11 U/L — SIGNIFICANT CHANGE UP (ref 10–45)
ALT FLD-CCNC: 12 U/L — SIGNIFICANT CHANGE UP (ref 10–45)
AMMONIA BLD-MCNC: 60 UMOL/L — HIGH (ref 11–55)
ANION GAP SERPL CALC-SCNC: 11 MMOL/L — SIGNIFICANT CHANGE UP (ref 5–17)
ANION GAP SERPL CALC-SCNC: 8 MMOL/L — SIGNIFICANT CHANGE UP (ref 5–17)
ANION GAP SERPL CALC-SCNC: 9 MMOL/L — SIGNIFICANT CHANGE UP (ref 5–17)
APTT BLD: 53.6 SEC — HIGH (ref 27.5–36.3)
APTT BLD: 55.1 SEC — HIGH (ref 27.5–36.3)
AST SERPL-CCNC: 23 U/L — SIGNIFICANT CHANGE UP (ref 10–40)
AST SERPL-CCNC: 24 U/L — SIGNIFICANT CHANGE UP (ref 10–40)
AST SERPL-CCNC: 24 U/L — SIGNIFICANT CHANGE UP (ref 10–40)
AST SERPL-CCNC: 25 U/L — SIGNIFICANT CHANGE UP (ref 10–40)
AST SERPL-CCNC: 35 U/L — SIGNIFICANT CHANGE UP (ref 10–40)
BILIRUB DIRECT SERPL-MCNC: 1.9 MG/DL — HIGH (ref 0–0.2)
BILIRUB DIRECT SERPL-MCNC: 2 MG/DL — HIGH (ref 0–0.2)
BILIRUB DIRECT SERPL-MCNC: 2 MG/DL — HIGH (ref 0–0.2)
BILIRUB INDIRECT FLD-MCNC: 6.9 MG/DL — HIGH (ref 0.2–1)
BILIRUB INDIRECT FLD-MCNC: 7.8 MG/DL — HIGH (ref 0.2–1)
BILIRUB INDIRECT FLD-MCNC: 8.2 MG/DL — HIGH (ref 0.2–1)
BILIRUB SERPL-MCNC: 10.2 MG/DL — HIGH (ref 0.2–1.2)
BILIRUB SERPL-MCNC: 8 MG/DL — HIGH (ref 0.2–1.2)
BILIRUB SERPL-MCNC: 8.1 MG/DL — HIGH (ref 0.2–1.2)
BILIRUB SERPL-MCNC: 8.8 MG/DL — HIGH (ref 0.2–1.2)
BILIRUB SERPL-MCNC: 9.8 MG/DL — HIGH (ref 0.2–1.2)
BUN SERPL-MCNC: 48 MG/DL — HIGH (ref 7–23)
BUN SERPL-MCNC: 48 MG/DL — HIGH (ref 7–23)
BUN SERPL-MCNC: 49 MG/DL — HIGH (ref 7–23)
BUN SERPL-MCNC: 50 MG/DL — HIGH (ref 7–23)
BUN SERPL-MCNC: 51 MG/DL — HIGH (ref 7–23)
CALCIUM SERPL-MCNC: 10.2 MG/DL — SIGNIFICANT CHANGE UP (ref 8.4–10.5)
CALCIUM SERPL-MCNC: 10.3 MG/DL — SIGNIFICANT CHANGE UP (ref 8.4–10.5)
CALCIUM SERPL-MCNC: 10.5 MG/DL — SIGNIFICANT CHANGE UP (ref 8.4–10.5)
CALCIUM SERPL-MCNC: 10.6 MG/DL — HIGH (ref 8.4–10.5)
CALCIUM SERPL-MCNC: 10.6 MG/DL — HIGH (ref 8.4–10.5)
CHLORIDE SERPL-SCNC: 100 MMOL/L — SIGNIFICANT CHANGE UP (ref 96–108)
CHLORIDE SERPL-SCNC: 96 MMOL/L — SIGNIFICANT CHANGE UP (ref 96–108)
CHLORIDE SERPL-SCNC: 97 MMOL/L — SIGNIFICANT CHANGE UP (ref 96–108)
CHLORIDE SERPL-SCNC: 97 MMOL/L — SIGNIFICANT CHANGE UP (ref 96–108)
CHLORIDE SERPL-SCNC: 99 MMOL/L — SIGNIFICANT CHANGE UP (ref 96–108)
CO2 SERPL-SCNC: 19 MMOL/L — LOW (ref 22–31)
CO2 SERPL-SCNC: 19 MMOL/L — LOW (ref 22–31)
CO2 SERPL-SCNC: 20 MMOL/L — LOW (ref 22–31)
CREAT SERPL-MCNC: 1.4 MG/DL — HIGH (ref 0.5–1.3)
CREAT SERPL-MCNC: 1.54 MG/DL — HIGH (ref 0.5–1.3)
CREAT SERPL-MCNC: 1.63 MG/DL — HIGH (ref 0.5–1.3)
CREAT SERPL-MCNC: 1.71 MG/DL — HIGH (ref 0.5–1.3)
CREAT SERPL-MCNC: 1.73 MG/DL — HIGH (ref 0.5–1.3)
CULTURE RESULTS: SIGNIFICANT CHANGE UP
GLUCOSE BLDC GLUCOMTR-MCNC: 140 MG/DL — HIGH (ref 70–99)
GLUCOSE BLDC GLUCOMTR-MCNC: 164 MG/DL — HIGH (ref 70–99)
GLUCOSE BLDC GLUCOMTR-MCNC: 200 MG/DL — HIGH (ref 70–99)
GLUCOSE SERPL-MCNC: 150 MG/DL — HIGH (ref 70–99)
GLUCOSE SERPL-MCNC: 151 MG/DL — HIGH (ref 70–99)
GLUCOSE SERPL-MCNC: 154 MG/DL — HIGH (ref 70–99)
GLUCOSE SERPL-MCNC: 169 MG/DL — HIGH (ref 70–99)
GLUCOSE SERPL-MCNC: 183 MG/DL — HIGH (ref 70–99)
HCT VFR BLD CALC: 22.4 % — LOW (ref 39–50)
HCT VFR BLD CALC: 22.5 % — LOW (ref 39–50)
HCT VFR BLD CALC: 22.8 % — LOW (ref 39–50)
HCT VFR BLD CALC: 23.7 % — LOW (ref 39–50)
HCT VFR BLD CALC: 24.9 % — LOW (ref 39–50)
HGB BLD-MCNC: 7.6 G/DL — LOW (ref 13–17)
HGB BLD-MCNC: 7.8 G/DL — LOW (ref 13–17)
HGB BLD-MCNC: 7.8 G/DL — LOW (ref 13–17)
HGB BLD-MCNC: 8.2 G/DL — LOW (ref 13–17)
HGB BLD-MCNC: 8.6 G/DL — LOW (ref 13–17)
INR BLD: 2.46 RATIO — HIGH (ref 0.88–1.16)
INR BLD: 2.56 RATIO — HIGH (ref 0.88–1.16)
MAGNESIUM SERPL-MCNC: 1.7 MG/DL — SIGNIFICANT CHANGE UP (ref 1.6–2.6)
MAGNESIUM SERPL-MCNC: 1.8 MG/DL — SIGNIFICANT CHANGE UP (ref 1.6–2.6)
MCHC RBC-ENTMCNC: 33.9 GM/DL — SIGNIFICANT CHANGE UP (ref 32–36)
MCHC RBC-ENTMCNC: 34.2 GM/DL — SIGNIFICANT CHANGE UP (ref 32–36)
MCHC RBC-ENTMCNC: 34.5 GM/DL — SIGNIFICANT CHANGE UP (ref 32–36)
MCHC RBC-ENTMCNC: 34.5 PG — HIGH (ref 27–34)
MCHC RBC-ENTMCNC: 34.5 PG — HIGH (ref 27–34)
MCHC RBC-ENTMCNC: 34.6 GM/DL — SIGNIFICANT CHANGE UP (ref 32–36)
MCHC RBC-ENTMCNC: 34.7 GM/DL — SIGNIFICANT CHANGE UP (ref 32–36)
MCHC RBC-ENTMCNC: 34.7 PG — HIGH (ref 27–34)
MCHC RBC-ENTMCNC: 35.1 PG — HIGH (ref 27–34)
MCHC RBC-ENTMCNC: 35.5 PG — HIGH (ref 27–34)
MCV RBC AUTO: 100.4 FL — HIGH (ref 80–100)
MCV RBC AUTO: 100.9 FL — HIGH (ref 80–100)
MCV RBC AUTO: 101.6 FL — HIGH (ref 80–100)
MCV RBC AUTO: 101.8 FL — HIGH (ref 80–100)
MCV RBC AUTO: 102.3 FL — HIGH (ref 80–100)
NRBC # BLD: 0 /100 WBCS — SIGNIFICANT CHANGE UP (ref 0–0)
PHOSPHATE SERPL-MCNC: 3.3 MG/DL — SIGNIFICANT CHANGE UP (ref 2.5–4.5)
PHOSPHATE SERPL-MCNC: 3.6 MG/DL — SIGNIFICANT CHANGE UP (ref 2.5–4.5)
PHOSPHATE SERPL-MCNC: 3.7 MG/DL — SIGNIFICANT CHANGE UP (ref 2.5–4.5)
PLATELET # BLD AUTO: 32 K/UL — LOW (ref 150–400)
PLATELET # BLD AUTO: 32 K/UL — LOW (ref 150–400)
PLATELET # BLD AUTO: 33 K/UL — LOW (ref 150–400)
PLATELET # BLD AUTO: 34 K/UL — LOW (ref 150–400)
PLATELET # BLD AUTO: 37 K/UL — LOW (ref 150–400)
POTASSIUM SERPL-MCNC: 4.8 MMOL/L — SIGNIFICANT CHANGE UP (ref 3.5–5.3)
POTASSIUM SERPL-MCNC: 4.9 MMOL/L — SIGNIFICANT CHANGE UP (ref 3.5–5.3)
POTASSIUM SERPL-MCNC: 5 MMOL/L — SIGNIFICANT CHANGE UP (ref 3.5–5.3)
POTASSIUM SERPL-MCNC: 5.2 MMOL/L — SIGNIFICANT CHANGE UP (ref 3.5–5.3)
POTASSIUM SERPL-MCNC: 5.9 MMOL/L — HIGH (ref 3.5–5.3)
POTASSIUM SERPL-SCNC: 4.8 MMOL/L — SIGNIFICANT CHANGE UP (ref 3.5–5.3)
POTASSIUM SERPL-SCNC: 4.9 MMOL/L — SIGNIFICANT CHANGE UP (ref 3.5–5.3)
POTASSIUM SERPL-SCNC: 5 MMOL/L — SIGNIFICANT CHANGE UP (ref 3.5–5.3)
POTASSIUM SERPL-SCNC: 5.2 MMOL/L — SIGNIFICANT CHANGE UP (ref 3.5–5.3)
POTASSIUM SERPL-SCNC: 5.9 MMOL/L — HIGH (ref 3.5–5.3)
PROT SERPL-MCNC: 5.7 G/DL — LOW (ref 6–8.3)
PROT SERPL-MCNC: 5.8 G/DL — LOW (ref 6–8.3)
PROT SERPL-MCNC: 5.9 G/DL — LOW (ref 6–8.3)
PROT SERPL-MCNC: 6.1 G/DL — SIGNIFICANT CHANGE UP (ref 6–8.3)
PROT SERPL-MCNC: 6.1 G/DL — SIGNIFICANT CHANGE UP (ref 6–8.3)
PROTHROM AB SERPL-ACNC: 28.8 SEC — HIGH (ref 10–12.9)
PROTHROM AB SERPL-ACNC: 30 SEC — HIGH (ref 10–12.9)
RBC # BLD: 2.2 M/UL — LOW (ref 4.2–5.8)
RBC # BLD: 2.2 M/UL — LOW (ref 4.2–5.8)
RBC # BLD: 2.26 M/UL — LOW (ref 4.2–5.8)
RBC # BLD: 2.36 M/UL — LOW (ref 4.2–5.8)
RBC # BLD: 2.45 M/UL — LOW (ref 4.2–5.8)
RBC # FLD: 18.8 % — HIGH (ref 10.3–14.5)
RBC # FLD: 18.9 % — HIGH (ref 10.3–14.5)
RBC # FLD: 18.9 % — HIGH (ref 10.3–14.5)
RBC # FLD: 19 % — HIGH (ref 10.3–14.5)
RBC # FLD: 19 % — HIGH (ref 10.3–14.5)
SODIUM SERPL-SCNC: 125 MMOL/L — LOW (ref 135–145)
SODIUM SERPL-SCNC: 126 MMOL/L — LOW (ref 135–145)
SODIUM SERPL-SCNC: 126 MMOL/L — LOW (ref 135–145)
SODIUM SERPL-SCNC: 130 MMOL/L — LOW (ref 135–145)
SODIUM SERPL-SCNC: 130 MMOL/L — LOW (ref 135–145)
SPECIMEN SOURCE: SIGNIFICANT CHANGE UP
WBC # BLD: 2.49 K/UL — LOW (ref 3.8–10.5)
WBC # BLD: 2.52 K/UL — LOW (ref 3.8–10.5)
WBC # BLD: 2.59 K/UL — LOW (ref 3.8–10.5)
WBC # BLD: 2.79 K/UL — LOW (ref 3.8–10.5)
WBC # BLD: 5.87 K/UL — SIGNIFICANT CHANGE UP (ref 3.8–10.5)
WBC # FLD AUTO: 2.49 K/UL — LOW (ref 3.8–10.5)
WBC # FLD AUTO: 2.52 K/UL — LOW (ref 3.8–10.5)
WBC # FLD AUTO: 2.59 K/UL — LOW (ref 3.8–10.5)
WBC # FLD AUTO: 2.79 K/UL — LOW (ref 3.8–10.5)
WBC # FLD AUTO: 5.87 K/UL — SIGNIFICANT CHANGE UP (ref 3.8–10.5)

## 2020-06-22 PROCEDURE — 99232 SBSQ HOSP IP/OBS MODERATE 35: CPT

## 2020-06-22 PROCEDURE — 76705 ECHO EXAM OF ABDOMEN: CPT | Mod: 26

## 2020-06-22 PROCEDURE — 99232 SBSQ HOSP IP/OBS MODERATE 35: CPT | Mod: GC

## 2020-06-22 PROCEDURE — 43235 EGD DIAGNOSTIC BRUSH WASH: CPT

## 2020-06-22 PROCEDURE — 99233 SBSQ HOSP IP/OBS HIGH 50: CPT | Mod: GC,25

## 2020-06-22 RX ORDER — SODIUM CHLORIDE 5 G/100ML
1000 INJECTION, SOLUTION INTRAVENOUS
Refills: 0 | Status: DISCONTINUED | OUTPATIENT
Start: 2020-06-22 | End: 2020-06-22

## 2020-06-22 RX ORDER — ERYTHROPOIETIN 10000 [IU]/ML
10000 INJECTION, SOLUTION INTRAVENOUS; SUBCUTANEOUS
Refills: 0 | Status: DISCONTINUED | OUTPATIENT
Start: 2020-06-22 | End: 2020-07-02

## 2020-06-22 RX ORDER — SODIUM CHLORIDE 5 G/100ML
1000 INJECTION, SOLUTION INTRAVENOUS
Refills: 0 | Status: DISCONTINUED | OUTPATIENT
Start: 2020-06-22 | End: 2020-06-23

## 2020-06-22 RX ORDER — MAGNESIUM SULFATE 500 MG/ML
2 VIAL (ML) INJECTION ONCE
Refills: 0 | Status: COMPLETED | OUTPATIENT
Start: 2020-06-22 | End: 2020-06-22

## 2020-06-22 RX ORDER — CHLORHEXIDINE GLUCONATE 213 G/1000ML
1 SOLUTION TOPICAL
Refills: 0 | Status: DISCONTINUED | OUTPATIENT
Start: 2020-06-23 | End: 2020-08-11

## 2020-06-22 RX ORDER — FILGRASTIM 480MCG/1.6
480 VIAL (ML) INJECTION ONCE
Refills: 0 | Status: COMPLETED | OUTPATIENT
Start: 2020-06-22 | End: 2020-06-22

## 2020-06-22 RX ORDER — ERYTHROPOIETIN 10000 [IU]/ML
10000 INJECTION, SOLUTION INTRAVENOUS; SUBCUTANEOUS
Refills: 0 | Status: DISCONTINUED | OUTPATIENT
Start: 2020-06-22 | End: 2020-06-22

## 2020-06-22 RX ADMIN — Medication 500000 UNIT(S): at 23:26

## 2020-06-22 RX ADMIN — LACTULOSE 30 GRAM(S): 10 SOLUTION ORAL at 11:35

## 2020-06-22 RX ADMIN — Medication 3 UNIT(S): at 07:30

## 2020-06-22 RX ADMIN — PANTOPRAZOLE SODIUM 40 MILLIGRAM(S): 20 TABLET, DELAYED RELEASE ORAL at 17:49

## 2020-06-22 RX ADMIN — NYSTATIN CREAM 1 APPLICATION(S): 100000 CREAM TOPICAL at 17:50

## 2020-06-22 RX ADMIN — NYSTATIN CREAM 1 APPLICATION(S): 100000 CREAM TOPICAL at 05:25

## 2020-06-22 RX ADMIN — SODIUM ZIRCONIUM CYCLOSILICATE 5 GRAM(S): 10 POWDER, FOR SUSPENSION ORAL at 05:24

## 2020-06-22 RX ADMIN — Medication 1 TABLET(S): at 17:48

## 2020-06-22 RX ADMIN — TAMSULOSIN HYDROCHLORIDE 0.4 MILLIGRAM(S): 0.4 CAPSULE ORAL at 21:43

## 2020-06-22 RX ADMIN — MIDODRINE HYDROCHLORIDE 20 MILLIGRAM(S): 2.5 TABLET ORAL at 05:24

## 2020-06-22 RX ADMIN — MIDODRINE HYDROCHLORIDE 20 MILLIGRAM(S): 2.5 TABLET ORAL at 17:49

## 2020-06-22 RX ADMIN — Medication 1: at 11:37

## 2020-06-22 RX ADMIN — PANTOPRAZOLE SODIUM 40 MILLIGRAM(S): 20 TABLET, DELAYED RELEASE ORAL at 05:24

## 2020-06-22 RX ADMIN — LINEZOLID 600 MILLIGRAM(S): 600 INJECTION, SOLUTION INTRAVENOUS at 05:24

## 2020-06-22 RX ADMIN — INSULIN GLARGINE 7 UNIT(S): 100 INJECTION, SOLUTION SUBCUTANEOUS at 22:30

## 2020-06-22 RX ADMIN — SODIUM CHLORIDE 1 GRAM(S): 9 INJECTION INTRAMUSCULAR; INTRAVENOUS; SUBCUTANEOUS at 05:24

## 2020-06-22 RX ADMIN — ONDANSETRON 4 MILLIGRAM(S): 8 TABLET, FILM COATED ORAL at 14:18

## 2020-06-22 RX ADMIN — ERYTHROPOIETIN 10000 UNIT(S): 10000 INJECTION, SOLUTION INTRAVENOUS; SUBCUTANEOUS at 11:44

## 2020-06-22 RX ADMIN — Medication 1: at 16:39

## 2020-06-22 RX ADMIN — Medication 500000 UNIT(S): at 17:49

## 2020-06-22 RX ADMIN — LACTULOSE 30 GRAM(S): 10 SOLUTION ORAL at 17:49

## 2020-06-22 RX ADMIN — Medication 1 MILLIGRAM(S): at 11:33

## 2020-06-22 RX ADMIN — SODIUM CHLORIDE 75 MILLILITER(S): 5 INJECTION, SOLUTION INTRAVENOUS at 11:33

## 2020-06-22 RX ADMIN — Medication 50 MILLILITER(S): at 05:25

## 2020-06-22 RX ADMIN — Medication 500000 UNIT(S): at 05:24

## 2020-06-22 RX ADMIN — Medication 3 UNIT(S): at 11:37

## 2020-06-22 RX ADMIN — Medication 480 MICROGRAM(S): at 17:49

## 2020-06-22 RX ADMIN — SODIUM CHLORIDE 1 GRAM(S): 9 INJECTION INTRAMUSCULAR; INTRAVENOUS; SUBCUTANEOUS at 21:43

## 2020-06-22 RX ADMIN — Medication 500000 UNIT(S): at 11:33

## 2020-06-22 RX ADMIN — MIDODRINE HYDROCHLORIDE 20 MILLIGRAM(S): 2.5 TABLET ORAL at 11:33

## 2020-06-22 RX ADMIN — Medication 3 UNIT(S): at 16:39

## 2020-06-22 RX ADMIN — SODIUM CHLORIDE 20 MILLILITER(S): 5 INJECTION, SOLUTION INTRAVENOUS at 23:26

## 2020-06-22 RX ADMIN — LACTULOSE 30 GRAM(S): 10 SOLUTION ORAL at 05:24

## 2020-06-22 RX ADMIN — ONDANSETRON 4 MILLIGRAM(S): 8 TABLET, FILM COATED ORAL at 23:26

## 2020-06-22 RX ADMIN — Medication 50 GRAM(S): at 23:32

## 2020-06-22 RX ADMIN — Medication 1 APPLICATION(S): at 05:25

## 2020-06-22 RX ADMIN — Medication 1 APPLICATION(S): at 17:50

## 2020-06-22 NOTE — PROGRESS NOTE ADULT - ASSESSMENT
encepaholapthy  anemia  advance cirrhosis    plan  hepatology input appreciated  EGD today; follow-up   cont antibiotics  diet as tolerated  monitor bm on lactulose  will follow

## 2020-06-22 NOTE — PROGRESS NOTE ADULT - SUBJECTIVE AND OBJECTIVE BOX
INTERVAL HPI/OVERNIGHT EVENTS:    overnight events noted   +melena with associated decline in hemoglobin  s/p 1uprbc yesterday with adequate rise in hemoglobin  transferred to SICU for close monitoring  continued to experience melena with another decline in hemoglobin  for EGD today       Allergies    codeine (Anaphylaxis)    Intolerances    General:  No wt loss, fevers, chills, night sweats, fatigue,   Eyes:  Good vision, no reported pain  ENT:  No sore throat, pain, runny nose, dysphagia  CV:  No pain, palpitations, hypo/hypertension  Resp:  No dyspnea, cough, tachypnea, wheezing  GI:  No pain, No nausea, No vomiting, No diarrhea, No constipation, No weight loss, No fever, No pruritis, No rectal bleeding, No tarry stools, No dysphagia,  :  No pain, bleeding, incontinence, nocturia  Muscle:  No pain, weakness  Neuro:  No weakness, tingling, memory problems  Psych:  No fatigue, insomnia, mood problems, depression  Endocrine:  No polyuria, polydipsia, cold/heat intolerance  Heme:  No petechiae, ecchymosis, easy bruisability  Skin:  No rash, tattoos, scars, edema    PHYSICAL EXAM:   Vital Signs:  Vital Signs Last 24 Hrs  T(C): 36.7 (14 Jun 2020 07:53), Max: 36.7 (14 Jun 2020 07:53)  T(F): 98.1 (14 Jun 2020 07:53), Max: 98.1 (14 Jun 2020 07:53)  HR: 79 (14 Jun 2020 07:53) (75 - 80)  BP: 127/77 (14 Jun 2020 07:53) (108/53 - 133/68)  BP(mean): --  RR: 18 (14 Jun 2020 07:53) (17 - 18)  SpO2: 97% (14 Jun 2020 07:53) (97% - 100%)  Daily     Daily I&O's Summary    13 Jun 2020 07:01  -  14 Jun 2020 07:00  --------------------------------------------------------  IN: 350 mL / OUT: 250 mL / NET: 100 mL    14 Jun 2020 07:01  -  14 Jun 2020 12:30  --------------------------------------------------------  IN: 0 mL / OUT: 350 mL / NET: -350 mL        GENERAL:  no distress  HEENT:  NC/AT   ABDOMEN:  Soft, non-tender, non-distended, normoactive bowel sounds  EXTEREMITIES:  + edema  SKIN:  No rash/erythema/ecchymoses/petechiae/wounds/abscess/warm/dry  NEURO:  Alert, oriented, +mild asterixis      LABS:                        9.0    5.07  )-----------( 68       ( 14 Jun 2020 07:17 )             26.4     06-14    131<L>  |  100  |  27<H>  ----------------------------<  141<H>  5.1   |  20<L>  |  1.74<H>    Ca    10.5      14 Jun 2020 07:17    TPro  6.3  /  Alb  3.8  /  TBili  11.0<H>  /  DBili  x   /  AST  24  /  ALT  15  /  AlkPhos  133<H>  06-14    PT/INR - ( 14 Jun 2020 09:19 )   PT: 26.3 sec;   INR: 2.23 ratio         PTT - ( 14 Jun 2020 09:19 )  PTT:44.2 sec    amylase   lipase  RADIOLOGY & ADDITIONAL TESTS:

## 2020-06-22 NOTE — PROGRESS NOTE ADULT - ATTENDING COMMENTS
Stable overnight  no further Hb drop  HD stable  alert and oriented x4  no abdominal pain  right heel ulcer - no evidence of cellulitis or pus    Plan  activate on liver transplant list  MELD 35

## 2020-06-22 NOTE — PROGRESS NOTE ADULT - SUBJECTIVE AND OBJECTIVE BOX
Follow Up:  UTI    Interval History: afebrile overnight. proceeding with EGD given hypotension and dropping H/H.     REVIEW OF SYSTEMS  [  ] ROS unobtainable because:    [ x  ] All other systems negative except as noted below    Constitutional:  [ ] fever [ ] chills  [ ] weight loss  [ ] weakness  Skin:  [ ] rash [ ] phlebitis	  Eyes: [ ] icterus [ ] pain  [ ] discharge	  ENMT: [ ] sore throat  [ ] thrush [ ] ulcers [ ] exudates  Respiratory: [ ] dyspnea [ ] hemoptysis [ ] cough [ ] sputum	  Cardiovascular:  [ ] chest pain [ ] palpitations [ ] edema	  Gastrointestinal:  [ ] nausea [ ] vomiting [ ] diarrhea [ ] constipation [ ] pain	  Genitourinary:  [ ] dysuria [ ] frequency [ ] hematuria [ ] discharge [ ] flank pain  [ ] incontinence  Musculoskeletal:  [ ] myalgias [ ] arthralgias [ ] arthritis  [ ] back pain  Neurological:  [ ] headache [ ] seizures  [ ] confusion/altered mental status    Allergies  codeine (Anaphylaxis)        ANTIMICROBIALS:  nystatin    Suspension 180129 four times a day  rifAXIMin 550 two times a day  trimethoprim  160 mG/sulfamethoxazole 800 mG 1 daily      OTHER MEDS:  MEDICATIONS  (STANDING):  epoetin barry-epbx (RETACRIT) Injectable 65254 <User Schedule>  hepatitis B Vaccine - Adult (ENGERIX-B) 20 once  insulin glargine Injectable (LANTUS) 7 at bedtime  insulin lispro (HumaLOG) corrective regimen sliding scale  three times a day before meals  insulin lispro (HumaLOG) corrective regimen sliding scale  at bedtime  insulin lispro Injectable (HumaLOG) 3 three times a day before meals  lactulose Syrup 30 every 6 hours  midodrine. 20 three times a day  ondansetron Injectable 4 every 8 hours  pantoprazole  Injectable 40 every 12 hours  tamsulosin 0.4 at bedtime      Vital Signs Last 24 Hrs  T(C): 36.6 (22 Jun 2020 16:30), Max: 36.6 (22 Jun 2020 11:00)  T(F): 97.9 (22 Jun 2020 16:30), Max: 97.9 (22 Jun 2020 11:00)  HR: 87 (22 Jun 2020 18:00) (73 - 91)  BP: 129/59 (22 Jun 2020 18:00) (98/54 - 145/63)  BP(mean): 85 (22 Jun 2020 18:00) (69 - 95)  RR: 16 (22 Jun 2020 18:00) (12 - 32)  SpO2: 99% (22 Jun 2020 18:00) (95% - 100%)    PHYSICAL EXAMINATION:  General: Alert and Awake, oriented 3x, NAD, jaundice  HEENT: PERRL, EOMI, scleral icterus  Neck: Supple  Cardiac: RRR, No M/R/G  Resp: CTAB, No Wh/Rh/Ra  Abdomen: NBS, NT/ND, No HSM, No rigidity or guarding  MSK: R heel with eschar without surrounding drainage or underlying fluctuance.  1+ LE edema. No Calf tenderness  : bob  Skin: No rashes or lesions. Skin is warm and dry to the touch.   Neuro: Alert and Awake. oriented 3x CN 2-12 Grossly intact. Moves all four extremities spontaneously.  Psych: Calm, Pleasant, Cooperative                                     7.8    2.79  )-----------( 33       ( 22 Jun 2020 16:45 )             22.8       06-22    130<L>  |  99  |  51<H>  ----------------------------<  151<H>  5.0   |  20<L>  |  1.54<H>    Ca    10.6<H>      22 Jun 2020 16:45  Phos  3.6     06-22  Mg     1.8     06-22    TPro  6.1  /  Alb  4.1  /  TBili  9.8<H>  /  DBili  2.0<H>  /  AST  24  /  ALT  11  /  AlkPhos  135<H>  06-22      Urinalysis Basic - ( 21 Jun 2020 14:31 )    Color: x / Appearance: x / SG: x / pH: x  Gluc: x / Ketone: x  / Bili: x / Urobili: x   Blood: x / Protein: x / Nitrite: x   Leuk Esterase: x / RBC: 176 /HPF / WBC 23 /HPF   Sq Epi: x / Non Sq Epi: 1 /HPF / Bacteria: Negative    MICROBIOLOGY:    .Urine Clean Catch (Midstream)  06-21-20   <10,000 CFU/ml of other organisms  --  --    .Urine Catheterized  06-14-20   >100,000 CFU/ml Enterococcus faecium (vancomycin resistant)  --  Enterococcus faecium (vancomycin resistant)    .Urine Clean Catch (Midstream)  06-14-20   >100,000 CFU/ml Enterococcus faecium (vancomycin resistant)  --  Enterococcus faecium (vancomycin resistant)    .Blood Blood-Peripheral  06-12-20   No Growth Final  --  --    CMV IgG Antibody: <0.20 U/mL (12-04-19 @ 08:33)  Toxoplasma IgG Screen: <3.0 IU/mL (12-04-19 @ 08:33)    RADIOLOGY:    <The imaging below has been reviewed and visualized by me independently. Findings as detailed in report below>    EXAM:  US ABDOMEN LIMITED                        PROCEDURE DATE:  06/22/2020    No sonographic evidence of ascites.    EXAM:  MR ANKLE WAW IC RT                        PROCEDURE DATE:  06/18/2020    Evaluation for soft tissue infection is limited without intravenous contrast.   Heterogeneous marrow signal without an adjacent skin ulceration or fluid collection to suggest osteomyelitis.  Degenerative changes and denervation edema as above.

## 2020-06-22 NOTE — PROGRESS NOTE ADULT - SUBJECTIVE AND OBJECTIVE BOX
Pre-Endoscopy Evaluation      Referring Physician: dr. 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, history of SBP, hepatic encephalopathy, and chronic anemia with a history of duodenal ulcer as well as GAVE and duodenal AVM s/p APC (last on 10/11/19) admitted with hepatic encephalopathy, found to have VRE UTI, MISA atop CKD and melena stools  s/p 2 units prbcs      PAST MEDICAL & SURGICAL HISTORY:  GIB (gastrointestinal bleeding)  GERD with esophagitis: Gastritis &amp; Non Bleeding Ulcers  Hepatic encephalopathy  Obesity  Fatty liver disease, nonalcoholic  Renal stones: 25 years ago  Hypertension  Neuropathy  Hypercholesteremia  Diabetes  S/P cholecystectomy      PMH of Gastroparesis [ ]  Gastric Surgery [ ]  Gastric Outlet Obstruction [ ]    Allergies    codeine (Anaphylaxis)    Intolerances:    Latex allergy: [ ] yes [X] no    Medications:MEDICATIONS  (STANDING):  epoetin barry-epbx (RETACRIT) Injectable 24894 Unit(s) SubCutaneous <User Schedule>  folic acid 1 milliGRAM(s) Oral daily  hepatitis B Vaccine - Adult (ENGERIX-B) 20 MICROGram(s) IntraMuscular once  insulin glargine Injectable (LANTUS) 7 Unit(s) SubCutaneous at bedtime  insulin lispro (HumaLOG) corrective regimen sliding scale   SubCutaneous three times a day before meals  insulin lispro (HumaLOG) corrective regimen sliding scale   SubCutaneous at bedtime  insulin lispro Injectable (HumaLOG) 3 Unit(s) SubCutaneous three times a day before meals  lactulose Syrup 30 Gram(s) Oral every 6 hours  linezolid    Tablet 600 milliGRAM(s) Oral every 12 hours  midodrine. 20 milliGRAM(s) Oral three times a day  nystatin    Suspension 385872 Unit(s) Oral four times a day  nystatin Cream 1 Application(s) Topical two times a day  ondansetron Injectable 4 milliGRAM(s) IV Push every 8 hours  pantoprazole  Injectable 40 milliGRAM(s) IV Push every 12 hours  rifAXIMin 550 milliGRAM(s) Oral two times a day  silver sulfADIAZINE 1% Cream 1 Application(s) Topical two times a day  sodium chloride 1 Gram(s) Oral every 8 hours  sodium chloride 2% . 1000 milliLiter(s) (75 mL/Hr) IV Continuous <Continuous>  tamsulosin 0.4 milliGRAM(s) Oral at bedtime    MEDICATIONS  (PRN):      Smoking: [ ] yes  [X] no    AICD/PPM: [ ] yes   [X] no    Pertinent lab data:                        7.6    2.52  )-----------( 32       ( 2020 10:55 )             22.4     06-22    126<L>  |  97  |  50<H>  ----------------------------<  183<H>  4.9   |  20<L>  |  1.63<H>    Ca    10.5      2020 10:55  Phos  3.6       Mg     1.7     22    TPro  5.7<L>  /  Alb  4.1  /  TBili  8.8<H>  /  DBili  1.9<H>  /  AST  23  /  ALT  11  /  AlkPhos  137<H>  22    PT/INR - ( 2020 03:41 )   PT: 28.8 sec;   INR: 2.46 ratio         PTT - ( 2020 03:41 )  PTT:53.6 sec      COVID-19 PCR: NotDetec (2020 19:32)      CAPILLARY BLOOD GLUCOSE  POCT Blood Glucose.: 200 mg/dL (2020 11:36)      Physical Examination:  Daily     Daily Weight in k.6 (2020 20:56)  Vital Signs Last 24 Hrs  T(C): 36.6 (2020 11:00), Max: 36.6 (2020 11:00)  T(F): 97.9 (2020 11:00), Max: 97.9 (2020 11:00)  HR: 91 (2020 14:00) (73 - 91)  BP: 115/68 (2020 14:00) (106/54 - 145/63)  BP(mean): 88 (2020 14:00) (77 - 95)  RR: 24 (2020 14:00) (12 - 32)  SpO2: 95% (2020 13:00) (95% - 100%)    Drug Dosing Weight  Height (cm): 185.4 (2020 19:45)  Weight (kg): 103 (2020 19:45)  BMI (kg/m2): 30 (2020 19:45)  BSA (m2): 2.27 (2020 19:45)    Constitutional: NAD     Neck:  No JVD    Respiratory: CTAB/L    Cardiovascular: S1 and S2    Gastrointestinal: BS+, soft    Extremities: No peripheral edema    Neurological:     Comments:      The patient is a suitable candidate for the planned procedure unless box checked [ ]  No, explain:

## 2020-06-22 NOTE — PROGRESS NOTE ADULT - SUBJECTIVE AND OBJECTIVE BOX
Chief Complaint:  Patient is a 64y old  Male who presents with a chief complaint of Liver failure, hepatic encephalopathy (2020 03:05)    Reason for consult: cirrhosis    Interval Events: Pt had presyncope while working w/ PT yesterday. Hb had dropped but having brown stool. Moved to SICU for closer monitoring. Feels well.    Hospital Medications:  albumin human 25% IVPB 100 milliLiter(s) IV Intermittent every 8 hours  folic acid 1 milliGRAM(s) Oral daily  hepatitis B Vaccine - Adult (ENGERIX-B) 20 MICROGram(s) IntraMuscular once  insulin glargine Injectable (LANTUS) 7 Unit(s) SubCutaneous at bedtime  insulin lispro (HumaLOG) corrective regimen sliding scale   SubCutaneous three times a day before meals  insulin lispro (HumaLOG) corrective regimen sliding scale   SubCutaneous at bedtime  insulin lispro Injectable (HumaLOG) 3 Unit(s) SubCutaneous three times a day before meals  lactulose Syrup 30 Gram(s) Oral every 6 hours  linezolid    Tablet 600 milliGRAM(s) Oral every 12 hours  midodrine. 20 milliGRAM(s) Oral three times a day  nystatin    Suspension [id number] Unit(s) Oral four times a day  nystatin Cream 1 Application(s) Topical two times a day  ondansetron Injectable 4 milliGRAM(s) IV Push every 8 hours  pantoprazole  Injectable 40 milliGRAM(s) IV Push every 12 hours  rifAXIMin 550 milliGRAM(s) Oral two times a day  silver sulfADIAZINE 1% Cream 1 Application(s) Topical two times a day  sodium chloride 1 Gram(s) Oral every 8 hours  tamsulosin 0.4 milliGRAM(s) Oral at bedtime      ROS:   General:  No  fevers, chills, night sweats, fatigue  Eyes:  Good vision, no reported pain  ENT:  No sore throat, pain, runny nose  CV:  No pain, palpitations  Pulm:  No dyspnea, cough  GI:  See HPI, otherwise negative  :  No  incontinence, nocturia  Muscle:  No pain, weakness  Neuro:  No memory problems  Psych:  No insomnia, mood problems, depression  Endocrine:  No polyuria, polydipsia, cold/heat intolerance  Heme:  No petechiae, ecchymosis, easy bruisability  Skin:  No rash    PHYSICAL EXAM:   Vital Signs:  Vital Signs Last 24 Hrs  T(C): 36.4 (2020 07:00), Max: 36.7 (2020 08:40)  T(F): 97.5 (2020 07:00), Max: 98.1 (2020 08:40)  HR: 86 (2020 07:00) (73 - 86)  BP: 124/60 (2020 07:00) (90/41 - 145/63)  BP(mean): 87 (2020 07:00) (77 - 95)  RR: 19 (:00) (12 - 32)  SpO2: 95% (:00) (95% - 100%)  Daily     Daily Weight in k.6 (2020 20:56)    GENERAL: no acute distress  NEURO: alert, oriented x 3, able to perform complex concentration tasks, mild asterixis  HEENT: anicteric sclera, no conjunctival pallor appreciated  CHEST: no respiratory distress, no accessory muscle use  CARDIAC: regular rate, rhythm  ABDOMEN: soft, non-tender, non-distended, no rebound or guarding  EXTREMITIES: warm, well perfused, no edema  SKIN: no lesions noted  RECTAL: brown stool    LABS: reviewed                        8.2    2.59  )-----------( 37       ( 2020 03:41 )             23.7     06-22    125<L>  |  97  |  48<H>  ----------------------------<  150<H>  5.2   |  20<L>  |  1.71<H>    Ca    10.2      2020 03:41  Phos  3.6     06-22  Mg     1.8     06-22    TPro  5.8<L>  /  Alb  4.0  /  TBili  8.1<H>  /  DBili  x   /  AST  24  /  ALT  11  /  AlkPhos  155<H>  06-22    LIVER FUNCTIONS - ( 2020 03:41 )  Alb: 4.0 g/dL / Pro: 5.8 g/dL / ALK PHOS: 155 U/L / ALT: 11 U/L / AST: 24 U/L / GGT: x             Interval Diagnostic Studies: see sunrise for full report Chief Complaint:  Patient is a 64y old  Male who presents with a chief complaint of Liver failure, hepatic encephalopathy (2020 03:05)    Reason for consult: cirrhosis    Interval Events: Pt had presyncope while working w/ PT yesterday. Hb had dropped but having brown stool, transfused 2U PRBCs. Moved to SICU for closer monitoring. Feels well.    Hospital Medications:  albumin human 25% IVPB 100 milliLiter(s) IV Intermittent every 8 hours  folic acid 1 milliGRAM(s) Oral daily  hepatitis B Vaccine - Adult (ENGERIX-B) 20 MICROGram(s) IntraMuscular once  insulin glargine Injectable (LANTUS) 7 Unit(s) SubCutaneous at bedtime  insulin lispro (HumaLOG) corrective regimen sliding scale   SubCutaneous three times a day before meals  insulin lispro (HumaLOG) corrective regimen sliding scale   SubCutaneous at bedtime  insulin lispro Injectable (HumaLOG) 3 Unit(s) SubCutaneous three times a day before meals  lactulose Syrup 30 Gram(s) Oral every 6 hours  linezolid    Tablet 600 milliGRAM(s) Oral every 12 hours  midodrine. 20 milliGRAM(s) Oral three times a day  nystatin    Suspension [id number] Unit(s) Oral four times a day  nystatin Cream 1 Application(s) Topical two times a day  ondansetron Injectable 4 milliGRAM(s) IV Push every 8 hours  pantoprazole  Injectable 40 milliGRAM(s) IV Push every 12 hours  rifAXIMin 550 milliGRAM(s) Oral two times a day  silver sulfADIAZINE 1% Cream 1 Application(s) Topical two times a day  sodium chloride 1 Gram(s) Oral every 8 hours  tamsulosin 0.4 milliGRAM(s) Oral at bedtime      ROS:   General:  No  fevers, chills, night sweats, fatigue  Eyes:  Good vision, no reported pain  ENT:  No sore throat, pain, runny nose  CV:  No pain, palpitations  Pulm:  No dyspnea, cough  GI:  See HPI, otherwise negative  :  No  incontinence, nocturia  Muscle:  No pain, weakness  Neuro:  no complaints  Psych:  No insomnia, mood problems, depression  Endocrine:  No polyuria, polydipsia, cold/heat intolerance  Heme:  No petechiae, ecchymosis, easy bruisability  Skin:  No rash    PHYSICAL EXAM:   Vital Signs:  Vital Signs Last 24 Hrs  T(C): 36.4 (2020 07:00), Max: 36.7 (2020 08:40)  T(F): 97.5 (2020 07:00), Max: 98.1 (2020 08:40)  HR: 86 (2020 07:00) (73 - 86)  BP: 124/60 (2020 07:00) (90/41 - 145/63)  BP(mean): 87 (2020 07:00) (77 - 95)  RR: 19 (2020 07:00) (12 - 32)  SpO2: 95% (2020 07:00) (95% - 100%)  Daily     Daily Weight in k.6 (2020 20:56)    GENERAL: no acute distress  NEURO: alert, oriented x 3, able to perform complex concentration tasks, mild asterixis  HEENT: anicteric sclera, no conjunctival pallor appreciated  CHEST: no respiratory distress, no accessory muscle use  CARDIAC: regular rate, rhythm  ABDOMEN: soft, non-tender, non-distended, no rebound or guarding; melena on exam  EXTREMITIES: warm, well perfused, 1+ edema  SKIN: right heel wound in dressing  RECTAL: brown stool    LABS: reviewed                        8.2    2.59  )-----------( 37       ( 2020 03:41 )             23.7     06-22    125<L>  |  97  |  48<H>  ----------------------------<  150<H>  5.2   |  20<L>  |  1.71<H>    Ca    10.2      2020 03:41  Phos  3.6     06-22  Mg     1.8     06-22    TPro  5.8<L>  /  Alb  4.0  /  TBili  8.1<H>  /  DBili  x   /  AST  24  /  ALT  11  /  AlkPhos  155<H>  -22    LIVER FUNCTIONS - ( 2020 03:41 )  Alb: 4.0 g/dL / Pro: 5.8 g/dL / ALK PHOS: 155 U/L / ALT: 11 U/L / AST: 24 U/L / GGT: x             Interval Diagnostic Studies: see sunrise for full report

## 2020-06-22 NOTE — PROGRESS NOTE ADULT - ASSESSMENT
-65 y/o with IDDM, dyslipidemia, obesity, GERD, HFpEF with mild LV diastolic dysfunction, MGUS, and decompensated JEAN cirrhosis presenting with AMS, found to have VRE UTI     Neuro:   - AAOx3, mild asterixis   - monitor mental status   - no Tylenol, narcotics, or benzos    Resp:   - no active issues  - on room air saturating well     Cardiac:   - monitor vital signs   - c/w midodrine     GI:   - CLD   - c/w lactulose   - c/w ppx   - monitor LFTs  - monitor ammonia     Renal:   - monitor Is and Os   - monitor electrolytes and replete   -c./w salt tabs     Heme:   - monitor CBC  - hold DVT ppx   - monitor INR   -s/p 2u pRBCs    ID:   - c/w linezolid for VRE   - c/w rifaximin   - holding home bacterium bc on linezolid     Endo:  - monitor FSG   - ISS and lantus

## 2020-06-22 NOTE — PROGRESS NOTE ADULT - ATTENDING COMMENTS
Patient with advanced liver disease, foot ulcer, anemia, Severe hyponatremia  MISA, non oliguric  Plan:  Check epo level, Procrit 10 K units today  NaCl repletion with 2% Saline, d/w ICU and transplant team  Will follow  I was present during and reviewed clinical and lab data as well as assessment and plan as documented by the house staff as noted. Please contact if any additional questions with any change in clinical condition or on availability of any additional information or reports.

## 2020-06-22 NOTE — PROGRESS NOTE ADULT - ASSESSMENT
64M w/ HFpEF, decompensated JEAN cirrhosis w/ hx of ascites + SBP, GI bleed 2/2 duodenal ulcers, angioectasias, and GAVE, ESBL E. Coli prostatic abscess s/p 4 weeks IV ertapenem, hx of ESBL UTIs and VRE colonization, presenting w/ jaundice and confusion, likely 2/2 HE and infection.    Impression:  #Cirrhosis 2/2 JEAN, decompensated by ascites; listed for liver transplant  -varices: small on last EGD 12/2019, not on BB  -ascites: trace on U/S this admission  -HCC: neg on U/S this admission  -HE: present on exam, on lactulose + rifaximin at home, improving w/ lactulose and tx of infection  -MELD-Na 34 on 6/22; listed at 35 from 6/18  #Anemia w/o overt GI bleeding, Hb down to 6.5, likely due to low level hemolysis from end stage cirrhosis  #R posterior heel wound w/ overlying eschar – no signs of infections, XR neg for gas, evaluated by podiatry and ID, awaiting MRI w/ IV contrast  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 per ID    Recommendation:  - continue to trend Hb daily, transfuse to Hb > 7, no concern for active GI bleeding at this time  - daily PT if possible, OOBTC as much as possible  - c/w lactulose titrated to 3-4 BMs per day  - c/w miralax  - c/w rifaximin  - f/u transplant ID recs re: antibiotics  - trend CMP, CBC, INR daily  - f/u renal recs re: SIADH, recommend 1.5L fluid restriction and protein supplementation for now  - continue home midodrine therapy  - low Na diet  - rest of care per SICU  - hepatology will follow    Ze Dominguez  Gastroenterology Fellow  Available via [employer] Teams  Call [phone] ([facility]) or Page [phone] ([facility]) from 8am-5pm M-F  Please page on-call GI fellow via the  at night and on weekends 64M w/ HFpEF, decompensated JEAN cirrhosis w/ hx of ascites + SBP, GI bleed 2/2 duodenal ulcers, angioectasias, and GAVE, ESBL E. Coli prostatic abscess s/p 4 weeks IV ertapenem, hx of ESBL UTIs and VRE colonization, admitted on 6/11/20 w/ jaundice and confusion, found to have MELD-increase and bacteriuria with positive leukocyte esterase. Mentation improved with Abx.    Impression:  #Cirrhosis 2/2 JEAN, decompensated by ascites; listed for liver transplant  -varices: small on last EGD 12/2019, not on BB  - acute on chronic anemia, Hb drop from ~8 to 7.0, Hb 7.4 g/dL after 2U PRBCs as before, indicating significant blood loss  -ascites: trace on U/S this admission  - hyponatremia: started on NaCl 2%  -HCC: neg on U/S this admission  -HE: present on exam, on rifaximin. Off lactulose as it can cause too much bowel distension during transplant  -MELD-Na 34 on 6/22; listed at 35 from 6/18  - coagulopathy: PLT 32, INR 2.62  - MISA, creat 1.90, possibly renal due to GI bleed  - hyponatremia, Na 126 - getting 2% NaCl by nephrology  #R posterior heel wound w/ overlying eschar – no signs of infections, XR neg for gas, evaluated by podiatry and ID. MRI w/o contrast limited, but no overt signs of active infection.  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 per ID    Recommendation:  - EGD today, NPO  - transfuse PRBCs for Hb < 7.0 g/dL  - c/w miralax  - c/w rifaximin  - f/u transplant ID recs re: antibiotics  - trend CMP, CBC, INR daily  - agree with NaCl infusion  - continue home midodrine therapy        Ze Dominguez  Gastroenterology Fellow  Available via [employer] Teams  Call [phone] ([facility]) or Page [phone] ([facility]) from 8am-5pm M-F  Please page on-call GI fellow via the  at night and on weekends

## 2020-06-22 NOTE — PROGRESS NOTE ADULT - ATTENDING COMMENTS
Pt seen and examined with ICU team, agree with above.    Neuro: A&Ox3  - On rifaxamin and lactulose for hepatic encephalopathy    Pulm: doing well    CV: orthostatic hypotension on midodrine  - Holding home lasix    GI: JEAN cirrhosis with acute decompensation likely due to infection  - Discuss ursodiol with Hepatology given hyperbilirubinemia  - Advance diet    : MISA stage 2, ? hypovolemia  - Improving  - Avoid nephrotoxic meds  - Hyponatremia: try to correct to goal Na 135 as pt is on the transplant list  - Serial BMP    ID: Recent prostate abscess and emphysematous cystitis, now with VRE UTI  - Continue linezolid  - Check abdominal ultrasound for ascites    Heme: elevated INR and thrombocytopenia consistent with advanced liver disease  - Holding chemical VTE px per Tx Sx

## 2020-06-22 NOTE — PROGRESS NOTE ADULT - ASSESSMENT
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 now admitted with hepatic encephalopathy, found to have VRE UTI, also with MISA on CKD.    JEAN Cirrhosis/Hepatic encephalopathy/VRE UTI  - VRE UTI, colonization. On 7-day course of linezolid (6/15-21).Continued for today will reevaluate,  Blood cultures negative. Continue Bactrim for UTI PPx (previous recurrent ESBL E coli infection)   - HE -continue rifaximin, Miralax, and lactulose   - s/p 2 units PRBC for symptomatic anemia  - Right heel wound- no signs of infections. XR neg for gas/OM; Foot MRI neg for OM.  - [facility]OS waitlisted for Liver Transplant.  MELD 34 today    Blood type A negative   - please start NS 1.5% for hyponatremia, f/u bmp  - Abd U/S evaluate for ascites- if present change antibiotic to levaquin for SBP ppx   - Neupogen please    - Daily CBC, CMP, INR      Please page [phone] with any questions

## 2020-06-22 NOTE — PROGRESS NOTE ADULT - SUBJECTIVE AND OBJECTIVE BOX
[facility] DIVISION OF KIDNEY DISEASES AND HYPERTENSION -- FOLLOW UP NOTE  --------------------------------------------------------------------------------  Abelino Patterson   Nephrology Fellow  Pager NS: [phone]/ [facility]: [phone]  (After 5 pm or on weekends please page the on-call fellow)    24 hour events/subjective:  Pt had presyncope while working w/ PT yesterday, BP: 90/41, concern for GIB, Hgb drop to 7, received 2u pRBC, Hgb this am 8.2. Transferred to SICU for closer monitoring. GI eval revealed brown stool, pt himself is unsure of seeing melanotic stool. Scr stable at 1.71mg/dl, BUN rising to 48, Na dropping to 125. Started albumin yest        PAST HISTORY  --------------------------------------------------------------------------------  No significant changes to PMH, PSH, FHx, SHx, unless otherwise noted    ALLERGIES & MEDICATIONS  --------------------------------------------------------------------------------  Allergies    codeine (Anaphylaxis)    Intolerances      Standing Inpatient Medications  albumin human 25% IVPB 100 milliLiter(s) IV Intermittent every 8 hours  epoetin barry-epbx (RETACRIT) Injectable 74108 Unit(s) SubCutaneous every 7 days  folic acid 1 milliGRAM(s) Oral daily  hepatitis B Vaccine - Adult (ENGERIX-B) 20 MICROGram(s) IntraMuscular once  insulin glargine Injectable (LANTUS) 7 Unit(s) SubCutaneous at bedtime  insulin lispro (HumaLOG) corrective regimen sliding scale   SubCutaneous three times a day before meals  insulin lispro (HumaLOG) corrective regimen sliding scale   SubCutaneous at bedtime  insulin lispro Injectable (HumaLOG) 3 Unit(s) SubCutaneous three times a day before meals  lactulose Syrup 30 Gram(s) Oral every 6 hours  linezolid    Tablet 600 milliGRAM(s) Oral every 12 hours  midodrine. 20 milliGRAM(s) Oral three times a day  nystatin    Suspension 062157 Unit(s) Oral four times a day  nystatin Cream 1 Application(s) Topical two times a day  ondansetron Injectable 4 milliGRAM(s) IV Push every 8 hours  pantoprazole  Injectable 40 milliGRAM(s) IV Push every 12 hours  rifAXIMin 550 milliGRAM(s) Oral two times a day  silver sulfADIAZINE 1% Cream 1 Application(s) Topical two times a day  sodium chloride 1 Gram(s) Oral every 8 hours  sodium chloride 2% . 1000 milliLiter(s) IV Continuous <Continuous>  tamsulosin 0.4 milliGRAM(s) Oral at bedtime    PRN Inpatient Medications      REVIEW OF SYSTEMS  --------------------------------------------------------------------------------  Gen: No fevers/chills  Head/Eyes/Ears/Mouth: No headache; Normal hearing; Normal vision    Respiratory: No dyspnea, cough  CV: No chest pain  GI: No abdominal pain, diarrhea, constipation, nausea, vomiting  : No increased frequency, dysuria  MSK: No joint pain/swelling; no edema    All other systems were reviewed and are negative, except as noted.    VITALS/PHYSICAL EXAM  --------------------------------------------------------------------------------  T(C): 36.4 (06-22-20 @ 07:00), Max: 36.5 (06-21-20 @ 14:49)  HR: 82 (06-22-20 @ 09:00) (73 - 86)  BP: 106/54 (06-22-20 @ 09:00) (90/41 - 145/63)  RR: 21 (06-22-20 @ 09:00) (12 - 32)  SpO2: 98% (06-22-20 @ 09:00) (95% - 100%)  Wt(kg): --        06-21-20 @ 07:01  -  06-22-20 @ 07:00  --------------------------------------------------------  IN: 1840 mL / OUT: 1620 mL / NET: 220 mL      Physical Exam:    	Gen: NAD, well-appearing on room air  	HEENT: Scleral icterus  	Pulm: CTA B/L, no crackles  	CV: RRR, S1S2; no rub/murmur  	GI: +BS, soft, nontender  	: No suprapubic tenderness  	MSK: Warm, no edema              Neuro: Awake, mild asterixis   	Psych: Normal affect and mood  	Skin: Warm, no cyanosis      LABS/STUDIES  --------------------------------------------------------------------------------              8.2    2.59  >-----------<  37       [06-22-20 @ 03:41]              23.7     125  |  97  |  48  ----------------------------<  150      [06-22-20 @ 03:41]  5.2   |  20  |  1.71        Ca     10.2     [06-22-20 @ 03:41]      Mg     1.8     [06-22-20 @ 03:41]      Phos  3.6     [06-22-20 @ 03:41]    TPro  5.8  /  Alb  4.0  /  TBili  8.1  /  DBili  x   /  AST  24  /  ALT  11  /  AlkPhos  155  [06-22-20 @ 03:41]    PT/INR: PT 28.8 , INR 2.46       [06-22-20 @ 03:41]  PTT: 53.6       [06-22-20 @ 03:41]      Creatinine Trend:  SCr 1.71 [06-22 @ 03:41]  SCr 1.73 [06-22 @ 02:30]  SCr 1.92 [06-21 @ 21:18]  SCr 1.90 [06-21 @ 09:30]  SCr 1.84 [06-20 @ 07:09]              Urinalysis - [06-21-20 @ 14:31]      Color  / Appearance  / SG  / pH       Gluc  / Ketone   / Bili  / Urobili        Blood  / Protein  / Leuk Est  / Nitrite        / WBC 23 / Hyaline 0 / Gran  / Sq Epi  / Non Sq Epi 1 / Bacteria Negative    Urine Creatinine 106      [06-21-20 @ 11:33]  Urine Sodium <35      [06-21-20 @ 11:33]  Urine Urea Nitrogen 865      [06-21-20 @ 13:13]  Urine Osmolality 474      [06-21-20 @ 13:13]    Iron 160, TIBC Unable to calculate Test Repeated, %sat Unable to calculate Test Repeated      [06-12-20 @ 09:04]  Ferritin 493      [04-29-20 @ 08:20]  PTH -- (Ca 10.3)      [12-13-19 @ 08:11]   12  Vitamin D (25OH) 25.3      [12-13-19 @ 08:11]  HbA1c 6.4      [02-12-20 @ 17:08]  TSH 3.26      [04-26-20 @ 09:47]  Lipid: chol 71, TG 63, HDL 30, LDL 28      [11-27-19 @ 09:11]    HBsAb Nonreact      [06-13-20 @ 10:35]  HBsAg Nonreact      [06-13-20 @ 10:35]  HBcAb Nonreact      [06-13-20 @ 10:35]

## 2020-06-22 NOTE — PROGRESS NOTE ADULT - ASSESSMENT
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     CT Pelvis with no evidence of abscess (but noncontrast)  BCx 6/12 with NGTD  UCx 6/14 with gram positive organisms (suspect VRE - had this previously)    Given worsening confusion out of proportion to patient's previous presentations for HE favor covering for VRE  Linezolid CAN interact with Midodrine and Tramadol (potential for serotonin syndrome but low likelihood - would monitor)  Mental status improved after initiation of linezolid  s/p 7 day empiric coverage for VRE    Noted to have purulent drainage from the right heel eschar by transplant surgery  No obvious drainage at time of my assessment   Podiatry without concerns for infection on reevaluation  MRI of foot without obvious infection or OM    Hypotension on 6/21 and drop in H/H  Repeat Blood cultures sent  Planned for EGD today     RECOMMENDATIONS    #Hypotension, Encephalopathy, Positive UCx, VRE Colonization  --Stop Linezolid  --Continue to follow CBC with diff  --Continue to follow temperature curve  --Follow up on repeat blood cultures    #MISA, Prophylactic Antibiotic  --Recommend Bactrim DS PO QD for now for UTI/SBP PPx (previous recurrent ESBL E coli infection) -  --Appreciate Nephro and transplant hepatology recommendations    #R Heel Eschar  --Monitor off of antibiotics     #Pre-OLT Evaluation, Encounter for Vaccination,  --Would wait on repeat blood cultures from 6/21 prior to ID clearance for OLT although doubt active infection  --Patient will require Daptomycin and Meropenem for danilo-operative prophylaxis  --PCV 13 and 23 vaccination completed February 2020  --Will require HBV Vaccination - can hold off for now    I will continue to follow. Please feel free to contact me with any further questions.    Eusebio Pérez M.D.  [facility] Division of Infectious Disease  8AM-5PM: Pager Number [phone]  After Hours (or if no response): Please contact the Infectious Diseases Office at [phone]     The above assessment and plan were discussed with 8ICU PA and Transplant NP

## 2020-06-22 NOTE — PROGRESS NOTE ADULT - SUBJECTIVE AND OBJECTIVE BOX
SICU DAILY PROGRESS NOTE  63 yo M PMHx of decompensated JEAN cirrhosis c/b small esophageal varices (12/2019), portal hypertensive gastropathy, ascites, hx SBP, hx hepatic encephalopathy, GAVE syndrome s/p APC, hx duodenal ulcer (5/2019), HTN, HLD, DM, HFpEF (EF 70%), recent ESBL E coli prostate abscess (3/2020 s/p 4 weeks ertapenem), orthostatic hypotension 2/2 midodrine presents with reports of confusion. Saw his hepatologist was noted to be more jaundiced and more confused than normal. Per wife his was not oriented to time and place, and forgot how many grandchildren he had. He also urinated in his bedroom. He was confused for roughly 12 hours. He is now back to his baseline mental status. He denies fever, chills, dysuria, increased urinary frequency, flank pain, neck stiffness. No melena, hematochezia, hematemesis. Patient found to have VRE UTI, being treated with linezolid.     Hospital course: Pt had black tarry BM this AM, first bloody BM this admission Hg dropped to 7 from 7.7. Pt worked with PT and was sat up in bed. He became dizzy and confused, BP measured to be 90/41, then improved to 119/64 when placed supine again. Pt remembers the whole event. He currently is at baseline mental status and eating lunch. Denies CP, SOB, abd pain, N/V/D. EGD 12/2019 showed small nonbleeding esophageal varices, portal hypertensive gastropathy, and GAVE s/p APC. Initially MICU consulted, ICU not indicated at that time. Patient received 1u PRBC on the floor with minimal response in H/H. Transplant team concerned for worsening hepatic encephalopathy and mental status. Patient AAOx3; however, per transplant team having difficulty spelling words. Patient transferred to SICU for closer monitoring. MELD today -34    24 HOUR EVENTS:  - no acute overnight events     SUBJECTIVE/ROS:  [ ] A ten-point review of systems was otherwise negative except as noted.  [ ] Due to altered mental status/intubation, subjective information were not able to be obtained from the patient. History was obtained, to the extent possible, from review of the chart and collateral sources of information.      NEURO  Exam: awake, alert, oriented, mild asterixis noted on exam   Meds: ondansetron Injectable 4 milliGRAM(s) IV Push every 8 hours    [x] Adequacy of sedation and pain control has been assessed and adjusted      RESPIRATORY  RR: 17 (06-22-20 @ 00:00) (12 - 26)  SpO2: 98% (06-22-20 @ 00:00) (95% - 100%)  Wt(kg): --  Exam: unlabored, clear to auscultation bilaterally  Mechanical Ventilation:     [N/A] Extubation Readiness Assessed  Meds:       CARDIOVASCULAR  HR: 78 (06-22-20 @ 00:00) (73 - 84)  BP: 124/59 (06-22-20 @ 00:00) (90/41 - 130/63)  BP(mean): 88 (06-22-20 @ 00:00) (80 - 90)  ABP: --  ABP(mean): --  Wt(kg): --  CVP(cm H2O): --  VBG - ( 21 Jun 2020 21:17 )  pH: 7.32  /  pCO2: 45    /  pO2: 51    / HCO3: 23    / Base Excess: -2.7  /  SaO2: 82     Lactate: 1.2          Exam: regular rate and rhythm  Cardiac Rhythm: sinus  Perfusion     [x]Adequate   [ ]Inadequate  Mentation   [x]Normal       [ ]Reduced  Extremities  [x]Warm         [ ]Cool  Volume Status [ ]Hypervolemic [x]Euvolemic [ ]Hypovolemic  Meds: midodrine. 20 milliGRAM(s) Oral three times a day  tamsulosin 0.4 milliGRAM(s) Oral at bedtime        GI/NUTRITION  Exam: soft, nontender, nondistended,   Diet: regular diet   Meds: lactulose Syrup 30 Gram(s) Oral every 6 hours  pantoprazole  Injectable 40 milliGRAM(s) IV Push every 12 hours      GENITOURINARY  I&O's Detail    06-20 @ 07:01  -  06-21 @ 07:00  --------------------------------------------------------  IN:    Oral Fluid: 550 mL  Total IN: 550 mL    OUT:    Indwelling Catheter - Urethral: 1400 mL  Total OUT: 1400 mL    Total NET: -850 mL      06-21 @ 07:01  -  06-22 @ 03:06  --------------------------------------------------------  IN:    Oral Fluid: 840 mL    Packed Red Blood Cells: 600 mL    Solution: 100 mL  Total IN: 1540 mL    OUT:    Indwelling Catheter - Urethral: 1020 mL    Voided: 275 mL  Total OUT: 1295 mL    Total NET: 245 mL          06-22    126<L>  |  96  |  48<H>  ----------------------------<  169<H>  5.9<H>   |  19<L>  |  1.73<H>    Ca    10.3      22 Jun 2020 02:30  Phos  3.7     06-22  Mg     1.8     06-22    TPro  5.9<L>  /  Alb  4.1  /  TBili  8.0<H>  /  DBili  x   /  AST  35  /  ALT  11  /  AlkPhos  157<H>  06-22    [ ] Beasley catheter, indication: N/A  Meds: albumin human 25% IVPB 100 milliLiter(s) IV Intermittent every 8 hours  dextrose 5%. 1000 milliLiter(s) IV Continuous <Continuous>  folic acid 1 milliGRAM(s) Oral daily  sodium chloride 1 Gram(s) Oral every 8 hours        HEMATOLOGIC  Meds:   [x] VTE Prophylaxis                        7.7    2.55  )-----------( 35       ( 21 Jun 2020 21:18 )             22.9     PT/INR - ( 22 Jun 2020 02:30 )   PT: 30.0 sec;   INR: 2.56 ratio         PTT - ( 22 Jun 2020 02:30 )  PTT:55.1 sec  Transfusion     [ ] PRBC   [ ] Platelets   [ ] FFP   [ ] Cryoprecipitate      INFECTIOUS DISEASES  WBC Count: 2.55 K/uL (06-21 @ 21:18)  WBC Count: 2.72 K/uL (06-21 @ 09:30)    RECENT CULTURES:    Meds: hepatitis B Vaccine - Adult (ENGERIX-B) 20 MICROGram(s) IntraMuscular once  linezolid    Tablet 600 milliGRAM(s) Oral every 12 hours  linezolid    Tablet 600 milliGRAM(s) Oral every 12 hours  nystatin    Suspension 487393 Unit(s) Oral four times a day  rifAXIMin 550 milliGRAM(s) Oral two times a day        ENDOCRINE  CAPILLARY BLOOD GLUCOSE      POCT Blood Glucose.: 204 mg/dL (21 Jun 2020 17:13)  POCT Blood Glucose.: 202 mg/dL (21 Jun 2020 12:35)  POCT Blood Glucose.: 197 mg/dL (21 Jun 2020 09:06)    Meds: glucagon  Injectable 1 milliGRAM(s) IntraMuscular once PRN  insulin glargine Injectable (LANTUS) 7 Unit(s) SubCutaneous at bedtime  insulin lispro (HumaLOG) corrective regimen sliding scale   SubCutaneous three times a day before meals  insulin lispro (HumaLOG) corrective regimen sliding scale   SubCutaneous at bedtime  insulin lispro Injectable (HumaLOG) 3 Unit(s) SubCutaneous three times a day before meals        ACCESS DEVICES:  [x] Peripheral IV  [ ] Central Venous Line	[ ] R	[ ] L	[ ] IJ	[ ] Fem	[ ] SC	Placed:   [ ] Arterial Line		[ ] R	[ ] L	[ ] Fem	[ ] Rad	[ ] Ax	Placed:   [ ] PICC:					[ ] Mediport  [ ] Urinary Catheter, Date Placed:   [x] Necessity of urinary, arterial, and venous catheters discussed    OTHER MEDICATIONS:  nystatin Cream 1 Application(s) Topical two times a day  silver sulfADIAZINE 1% Cream 1 Application(s) Topical two times a day  sodium zirconium cyclosilicate 5 Gram(s) Oral three times a day

## 2020-06-22 NOTE — PROGRESS NOTE ADULT - ASSESSMENT
Pt with MISA likely 2/2 pre renal and/or ATN in setting hypotension, less likely HRS (Concepcion>35, no response tx)    #Acute Kidney Injury  - MISA likely 2/2 pre renal ATN in setting hypotension, infection, decrease PO intake, less likely HRS (Concepcion>35, no response tx)  - Urine Na 112 ~ ATN, UPro/Cr 0.2 gram, UA showed UTI w/o protein/rbc.    - Baseline sCr 1.0 in 5/2020, on admission sCr 1.57 (6/11), uptrend to 1.75 on 6/13 and has since fluctuated 1.7-1.9mg/dl. Today Scr 1.71  - Borderline hypotension during hospitalization (on midodrine), no response to albumin/midodrine treated, less likely HRS given Concepcion>35. Good UOP during hospitalization, lasix/aldactone held.    - Prior MISA w/u Dec 2019 showed FLC elevated K/L ratio 2.91 (K>L) and immunofixation showing "one Weak IgA Lambda  Band and One Weak IgG Lambda Band Identified"  - C/w holding diuretics for now. Recommend to hold further albumin for now  - Check abdominal ultrasound, check bladder sono to r/o urinary retention as bob was removed  - No absolute indication for dialysis, no fluid overload no refractory electrolyte derangement   - Avoid nephrotoxin meds such as NSAIDS, PPI, ACEI/ARB, and contrast agents.  - Renally dose medications per GFR.    #Hyponatremia  - Na 125, down from 130  - Would fluid restrict 1L, encourage PO intake with protein, can add protein shakes if ok from hepatology standpoint, liberalize salt intake  - Hold diuretics for now  - Will start 2%HTS at 75cc/hr, check stat BMP, check BMP q 4hrs, Goal Na for tmrw is ~135      #Anemia  - Will check EPO level and start ARANZA 10K units TIW      Abelino Patterson   Nephrology Fellow  Pager NS: [phone]/ [facility]: [phone]  (After 5 pm or on weekends please page the on-call fellow)

## 2020-06-22 NOTE — PROGRESS NOTE ADULT - SUBJECTIVE AND OBJECTIVE BOX
Transplant Surgery    Present:   Patient seen and examined with multidisciplinary team including Transplant Surgeon: Dr. Ackerman, hepatologist Dr. Mo, NP Flora Akers in AM rounds with Dr. Ackerman. Disciplines not in attendance will be notified of the plan.     HPI:  63 y/o gentleman with IDDM, dyslipidemia, obesity, GERD, HFpEF with mild LV diastolic dysfunction, MGUS, and decompensated JEAN cirrhosis complicated by ascites, history of SBP, hepatic encephalopathy, and chronic anemia with a history of duodenal ulcer as well as GAVE and duodenal AVM s/p APC (last on 10/11/19), [facility]OS listed for liver transplantation at [facility]. 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 He is now re-admitted with hepatic encephalopathy and VRE UTI being treated w/ linezolid. Also admitted with MISA on CKD.  MELD 32    Blood type A negative    Interval Events:   - yesterday episode hypotension 90/40 while working w/ PT resolved   - s/p 2 units PRBC for downtrending h/h 7/20 now 8.2/23.7   - last night became mildy encephalopathic, transferred to Transplant Surgery team/  SICU for neuro and HD monitoring.   -now AOx-3- slow to respond, vitals stable, afebrile, persistantly hyponatremic Na today 125 <- 126   - MELD 34   -R heel w/ ulcer; no evidence of infection, evaluated by podiatry, XR and MRI of foot both neg for OM.    Plan of care:  See Below        MEDICATIONS  (STANDING):  albumin human 25% IVPB 100 milliLiter(s) IV Intermittent every 8 hours  epoetin barry-epbx (RETACRIT) Injectable 34916 Unit(s) SubCutaneous <User Schedule>  folic acid 1 milliGRAM(s) Oral daily  hepatitis B Vaccine - Adult (ENGERIX-B) 20 MICROGram(s) IntraMuscular once  insulin glargine Injectable (LANTUS) 7 Unit(s) SubCutaneous at bedtime  insulin lispro (HumaLOG) corrective regimen sliding scale   SubCutaneous three times a day before meals  insulin lispro (HumaLOG) corrective regimen sliding scale   SubCutaneous at bedtime  insulin lispro Injectable (HumaLOG) 3 Unit(s) SubCutaneous three times a day before meals  lactulose Syrup 30 Gram(s) Oral every 6 hours  linezolid    Tablet 600 milliGRAM(s) Oral every 12 hours  midodrine. 20 milliGRAM(s) Oral three times a day  nystatin    Suspension [id number] Unit(s) Oral four times a day  nystatin Cream 1 Application(s) Topical two times a day  ondansetron Injectable 4 milliGRAM(s) IV Push every 8 hours  pantoprazole  Injectable 40 milliGRAM(s) IV Push every 12 hours  rifAXIMin 550 milliGRAM(s) Oral two times a day  silver sulfADIAZINE 1% Cream 1 Application(s) Topical two times a day  sodium chloride 1 Gram(s) Oral every 8 hours  sodium chloride 2% . 1000 milliLiter(s) (75 mL/Hr) IV Continuous <Continuous>  tamsulosin 0.4 milliGRAM(s) Oral at bedtime    MEDICATIONS  (PRN):      PAST MEDICAL & SURGICAL HISTORY:  GIB (gastrointestinal bleeding)  GERD with esophagitis: Gastritis &amp; Non Bleeding Ulcers  Hepatic encephalopathy  Obesity  Fatty liver disease, nonalcoholic  Renal stones: 25 years ago  Hypertension  Neuropathy  Hypercholesteremia  Diabetes  S/P cholecystectomy      Vital Signs Last 24 Hrs  T(C): 36.4 (22 Jun 2020 07:00), Max: 36.5 (21 Jun 2020 14:49)  T(F): 97.5 (22 Jun 2020 07:00), Max: 97.7 (21 Jun 2020 14:49)  HR: 82 (22 Jun 2020 09:00) (73 - 86)  BP: 106/54 (22 Jun 2020 09:00) (106/54 - 145/63)  BP(mean): 78 (22 Jun 2020 09:00) (77 - 95)  RR: 21 (22 Jun 2020 09:00) (12 - 32)  SpO2: 98% (22 Jun 2020 09:00) (95% - 100%)    I&O's Summary    21 Jun 2020 07:01  -  22 Jun 2020 07:00  --------------------------------------------------------  IN: 1840 mL / OUT: 1620 mL / NET: 220 mL          LABS:                        7.6    2.52  )-----------( 32       ( 22 Jun 2020 10:55 )             22.4     06-22    126<L>  |  97  |  50<H>  ----------------------------<  183<H>  4.9   |  20<L>  |  1.63<H>    Ca    10.5      22 Jun 2020 10:55  Phos  3.6     06-22  Mg     1.7     06-22    TPro  5.7<L>  /  Alb  4.1  /  TBili  8.8<H>  /  DBili  1.9<H>  /  AST  23  /  ALT  11  /  AlkPhos  137<H>  06-22    PT/INR - ( 22 Jun 2020 03:41 )   PT: 28.8 sec;   INR: 2.46 ratio         PTT - ( 22 Jun 2020 03:41 )  PTT:53.6 sec  Urinalysis Basic - ( 21 Jun 2020 14:31 )    Color: x / Appearance: x / SG: x / pH: x  Gluc: x / Ketone: x  / Bili: x / Urobili: x   Blood: x / Protein: x / Nitrite: x   Leuk Esterase: x / RBC: 176 /HPF / WBC 23 /HPF   Sq Epi: x / Non Sq Epi: 1 /HPF / Bacteria: Negative      CAPILLARY BLOOD GLUCOSE      POCT Blood Glucose.: 140 mg/dL (22 Jun 2020 07:29)  POCT Blood Glucose.: 204 mg/dL (21 Jun 2020 17:13)  POCT Blood Glucose.: 202 mg/dL (21 Jun 2020 12:35)    LIVER FUNCTIONS - ( 22 Jun 2020 10:55 )  Alb: 4.1 g/dL / Pro: 5.7 g/dL / ALK PHOS: 137 U/L / ALT: 11 U/L / AST: 23 U/L / GGT: x                 Cultures:  	  Review of systems  Gen: No weight changes, fatigue, fevers/chills, weakness  Skin: Jaundice, +R heel ulcer  Head/Eyes/Ears/Mouth: No headache; Normal hearing; Normal vision w/o blurriness; No sinus pain/discomfort.    Respiratory: No dyspnea, cough, wheezing, hemoptysis  CV: No chest pain, PND, orthopnea  GI: denies diarrhea, constipation, nausea, vomiting, melena, hematochezia  : No increased frequency, dysuria, hematuria, nocturia  MSK: No joint pain/swelling; no back pain; no edema  Neuro: No dizziness/lightheadedness, weakness, seizures, numbness, tingling  Heme: No easy bruising or bleeding  Endo: No heat/cold intolerance  Psych: No significant nervousness, anxiety, stress, depression  All other systems were reviewed and are negative, except as noted.        PHYSICAL EXAM:  Constitutional: Well developed / well nourished  Eyes: Anicteric, PERRLA  ENMT: nc/at  Neck: supple  Respiratory: CTA B/L  Cardiovascular: RRR  Abdominal:  soft, NT, ND  Genitourinary: Voiding spontaneously  Extremities: R heel wound w/ overlying eschar w/ no signs of infection.   Vascular: Palpable dp pulses bilaterally  Neurological: A&O x2-3  Skin: R heel breakdown, no rashes,  lesions   Musculoskeletal: Moving all extremities  Psychiatric: Responsive

## 2020-06-23 LAB
ALBUMIN SERPL ELPH-MCNC: 3.8 G/DL — SIGNIFICANT CHANGE UP (ref 3.3–5)
ALBUMIN SERPL ELPH-MCNC: 4 G/DL — SIGNIFICANT CHANGE UP (ref 3.3–5)
ALP SERPL-CCNC: 107 U/L — SIGNIFICANT CHANGE UP (ref 40–120)
ALP SERPL-CCNC: 74 U/L — SIGNIFICANT CHANGE UP (ref 40–120)
ALP SERPL-CCNC: 79 U/L — SIGNIFICANT CHANGE UP (ref 40–120)
ALP SERPL-CCNC: 94 U/L — SIGNIFICANT CHANGE UP (ref 40–120)
ALT FLD-CCNC: 13 U/L — SIGNIFICANT CHANGE UP (ref 10–45)
ALT FLD-CCNC: 14 U/L — SIGNIFICANT CHANGE UP (ref 10–45)
AMMONIA BLD-MCNC: 47 UMOL/L — SIGNIFICANT CHANGE UP (ref 11–55)
ANION GAP SERPL CALC-SCNC: 10 MMOL/L — SIGNIFICANT CHANGE UP (ref 5–17)
ANION GAP SERPL CALC-SCNC: 10 MMOL/L — SIGNIFICANT CHANGE UP (ref 5–17)
ANION GAP SERPL CALC-SCNC: 11 MMOL/L — SIGNIFICANT CHANGE UP (ref 5–17)
ANION GAP SERPL CALC-SCNC: 14 MMOL/L — SIGNIFICANT CHANGE UP (ref 5–17)
APTT BLD: 56.2 SEC — HIGH (ref 27.5–36.3)
AST SERPL-CCNC: 21 U/L — SIGNIFICANT CHANGE UP (ref 10–40)
AST SERPL-CCNC: 23 U/L — SIGNIFICANT CHANGE UP (ref 10–40)
AST SERPL-CCNC: 23 U/L — SIGNIFICANT CHANGE UP (ref 10–40)
AST SERPL-CCNC: 24 U/L — SIGNIFICANT CHANGE UP (ref 10–40)
BILIRUB DIRECT SERPL-MCNC: 2.1 MG/DL — HIGH (ref 0–0.2)
BILIRUB DIRECT SERPL-MCNC: 2.2 MG/DL — HIGH (ref 0–0.2)
BILIRUB DIRECT SERPL-MCNC: 2.4 MG/DL — HIGH (ref 0–0.2)
BILIRUB DIRECT SERPL-MCNC: 2.5 MG/DL — HIGH (ref 0–0.2)
BILIRUB INDIRECT FLD-MCNC: 10 MG/DL — HIGH (ref 0.2–1)
BILIRUB INDIRECT FLD-MCNC: 10.6 MG/DL — HIGH (ref 0.2–1)
BILIRUB INDIRECT FLD-MCNC: 10.7 MG/DL — HIGH (ref 0.2–1)
BILIRUB INDIRECT FLD-MCNC: 9.2 MG/DL — HIGH (ref 0.2–1)
BILIRUB SERPL-MCNC: 11.3 MG/DL — HIGH (ref 0.2–1.2)
BILIRUB SERPL-MCNC: 12.5 MG/DL — HIGH (ref 0.2–1.2)
BILIRUB SERPL-MCNC: 12.8 MG/DL — HIGH (ref 0.2–1.2)
BILIRUB SERPL-MCNC: 13.1 MG/DL — HIGH (ref 0.2–1.2)
BUN SERPL-MCNC: 48 MG/DL — HIGH (ref 7–23)
BUN SERPL-MCNC: 51 MG/DL — HIGH (ref 7–23)
BUN SERPL-MCNC: 54 MG/DL — HIGH (ref 7–23)
BUN SERPL-MCNC: 56 MG/DL — HIGH (ref 7–23)
CALCIUM SERPL-MCNC: 10.5 MG/DL — SIGNIFICANT CHANGE UP (ref 8.4–10.5)
CALCIUM SERPL-MCNC: 10.6 MG/DL — HIGH (ref 8.4–10.5)
CALCIUM SERPL-MCNC: 10.7 MG/DL — HIGH (ref 8.4–10.5)
CALCIUM SERPL-MCNC: 10.9 MG/DL — HIGH (ref 8.4–10.5)
CHLORIDE SERPL-SCNC: 100 MMOL/L — SIGNIFICANT CHANGE UP (ref 96–108)
CHLORIDE SERPL-SCNC: 102 MMOL/L — SIGNIFICANT CHANGE UP (ref 96–108)
CHLORIDE SERPL-SCNC: 102 MMOL/L — SIGNIFICANT CHANGE UP (ref 96–108)
CHLORIDE SERPL-SCNC: 99 MMOL/L — SIGNIFICANT CHANGE UP (ref 96–108)
CO2 SERPL-SCNC: 17 MMOL/L — LOW (ref 22–31)
CO2 SERPL-SCNC: 17 MMOL/L — LOW (ref 22–31)
CO2 SERPL-SCNC: 18 MMOL/L — LOW (ref 22–31)
CO2 SERPL-SCNC: 19 MMOL/L — LOW (ref 22–31)
CREAT SERPL-MCNC: 1.36 MG/DL — HIGH (ref 0.5–1.3)
CREAT SERPL-MCNC: 1.39 MG/DL — HIGH (ref 0.5–1.3)
CREAT SERPL-MCNC: 1.39 MG/DL — HIGH (ref 0.5–1.3)
CREAT SERPL-MCNC: 1.45 MG/DL — HIGH (ref 0.5–1.3)
EPO SERPL-MCNC: 44.7 MIU/ML — HIGH (ref 2.6–18.5)
FIBRINOGEN PPP-MCNC: 107 MG/DL — LOW (ref 350–510)
GLUCOSE BLDC GLUCOMTR-MCNC: 115 MG/DL — HIGH (ref 70–99)
GLUCOSE BLDC GLUCOMTR-MCNC: 124 MG/DL — HIGH (ref 70–99)
GLUCOSE BLDC GLUCOMTR-MCNC: 140 MG/DL — HIGH (ref 70–99)
GLUCOSE SERPL-MCNC: 111 MG/DL — HIGH (ref 70–99)
GLUCOSE SERPL-MCNC: 115 MG/DL — HIGH (ref 70–99)
GLUCOSE SERPL-MCNC: 143 MG/DL — HIGH (ref 70–99)
GLUCOSE SERPL-MCNC: 206 MG/DL — HIGH (ref 70–99)
HCT VFR BLD CALC: 20.5 % — CRITICAL LOW (ref 39–50)
HCT VFR BLD CALC: 21.3 % — LOW (ref 39–50)
HCT VFR BLD CALC: 21.3 % — LOW (ref 39–50)
HCT VFR BLD CALC: 22.3 % — LOW (ref 39–50)
HGB BLD-MCNC: 7.1 G/DL — LOW (ref 13–17)
HGB BLD-MCNC: 7.1 G/DL — LOW (ref 13–17)
HGB BLD-MCNC: 7.3 G/DL — LOW (ref 13–17)
HGB BLD-MCNC: 7.4 G/DL — LOW (ref 13–17)
INR BLD: 2.49 RATIO — HIGH (ref 0.88–1.16)
MAGNESIUM SERPL-MCNC: 1.8 MG/DL — SIGNIFICANT CHANGE UP (ref 1.6–2.6)
MAGNESIUM SERPL-MCNC: 1.9 MG/DL — SIGNIFICANT CHANGE UP (ref 1.6–2.6)
MAGNESIUM SERPL-MCNC: 2 MG/DL — SIGNIFICANT CHANGE UP (ref 1.6–2.6)
MAGNESIUM SERPL-MCNC: 2.2 MG/DL — SIGNIFICANT CHANGE UP (ref 1.6–2.6)
MCHC RBC-ENTMCNC: 33.2 GM/DL — SIGNIFICANT CHANGE UP (ref 32–36)
MCHC RBC-ENTMCNC: 33.3 GM/DL — SIGNIFICANT CHANGE UP (ref 32–36)
MCHC RBC-ENTMCNC: 33.9 PG — SIGNIFICANT CHANGE UP (ref 27–34)
MCHC RBC-ENTMCNC: 34.1 PG — HIGH (ref 27–34)
MCHC RBC-ENTMCNC: 34.3 GM/DL — SIGNIFICANT CHANGE UP (ref 32–36)
MCHC RBC-ENTMCNC: 34.6 GM/DL — SIGNIFICANT CHANGE UP (ref 32–36)
MCHC RBC-ENTMCNC: 34.9 PG — HIGH (ref 27–34)
MCHC RBC-ENTMCNC: 35.9 PG — HIGH (ref 27–34)
MCV RBC AUTO: 101.9 FL — HIGH (ref 80–100)
MCV RBC AUTO: 102.3 FL — HIGH (ref 80–100)
MCV RBC AUTO: 102.4 FL — HIGH (ref 80–100)
MCV RBC AUTO: 103.5 FL — HIGH (ref 80–100)
NRBC # BLD: 0 /100 WBCS — SIGNIFICANT CHANGE UP (ref 0–0)
OSMOLALITY UR: 483 MOS/KG — SIGNIFICANT CHANGE UP (ref 300–900)
PHOSPHATE SERPL-MCNC: 2.7 MG/DL — SIGNIFICANT CHANGE UP (ref 2.5–4.5)
PHOSPHATE SERPL-MCNC: 3 MG/DL — SIGNIFICANT CHANGE UP (ref 2.5–4.5)
PHOSPHATE SERPL-MCNC: 3.1 MG/DL — SIGNIFICANT CHANGE UP (ref 2.5–4.5)
PHOSPHATE SERPL-MCNC: 3.2 MG/DL — SIGNIFICANT CHANGE UP (ref 2.5–4.5)
PLATELET # BLD AUTO: 22 K/UL — LOW (ref 150–400)
PLATELET # BLD AUTO: 27 K/UL — LOW (ref 150–400)
PLATELET # BLD AUTO: 29 K/UL — LOW (ref 150–400)
PLATELET # BLD AUTO: 30 K/UL — LOW (ref 150–400)
POTASSIUM SERPL-MCNC: 4.8 MMOL/L — SIGNIFICANT CHANGE UP (ref 3.5–5.3)
POTASSIUM SERPL-MCNC: 4.9 MMOL/L — SIGNIFICANT CHANGE UP (ref 3.5–5.3)
POTASSIUM SERPL-MCNC: 5.1 MMOL/L — SIGNIFICANT CHANGE UP (ref 3.5–5.3)
POTASSIUM SERPL-MCNC: 5.2 MMOL/L — SIGNIFICANT CHANGE UP (ref 3.5–5.3)
POTASSIUM SERPL-SCNC: 4.8 MMOL/L — SIGNIFICANT CHANGE UP (ref 3.5–5.3)
POTASSIUM SERPL-SCNC: 4.9 MMOL/L — SIGNIFICANT CHANGE UP (ref 3.5–5.3)
POTASSIUM SERPL-SCNC: 5.1 MMOL/L — SIGNIFICANT CHANGE UP (ref 3.5–5.3)
POTASSIUM SERPL-SCNC: 5.2 MMOL/L — SIGNIFICANT CHANGE UP (ref 3.5–5.3)
PROT SERPL-MCNC: 5.6 G/DL — LOW (ref 6–8.3)
PROT SERPL-MCNC: 5.7 G/DL — LOW (ref 6–8.3)
PROT SERPL-MCNC: 5.7 G/DL — LOW (ref 6–8.3)
PROT SERPL-MCNC: 5.9 G/DL — LOW (ref 6–8.3)
PROTHROM AB SERPL-ACNC: 29.4 SEC — HIGH (ref 10–12.9)
RBC # BLD: 1.98 M/UL — LOW (ref 4.2–5.8)
RBC # BLD: 2.08 M/UL — LOW (ref 4.2–5.8)
RBC # BLD: 2.09 M/UL — LOW (ref 4.2–5.8)
RBC # BLD: 2.18 M/UL — LOW (ref 4.2–5.8)
RBC # FLD: 18.8 % — HIGH (ref 10.3–14.5)
RBC # FLD: 18.9 % — HIGH (ref 10.3–14.5)
RBC # FLD: 19.3 % — HIGH (ref 10.3–14.5)
RBC # FLD: 20 % — HIGH (ref 10.3–14.5)
SODIUM SERPL-SCNC: 128 MMOL/L — LOW (ref 135–145)
SODIUM SERPL-SCNC: 130 MMOL/L — LOW (ref 135–145)
SODIUM SERPL-SCNC: 130 MMOL/L — LOW (ref 135–145)
SODIUM SERPL-SCNC: 131 MMOL/L — LOW (ref 135–145)
SODIUM UR-SCNC: <35 MMOL/L — SIGNIFICANT CHANGE UP
WBC # BLD: 10.4 K/UL — SIGNIFICANT CHANGE UP (ref 3.8–10.5)
WBC # BLD: 11.69 K/UL — HIGH (ref 3.8–10.5)
WBC # BLD: 13.44 K/UL — HIGH (ref 3.8–10.5)
WBC # BLD: 13.64 K/UL — HIGH (ref 3.8–10.5)
WBC # FLD AUTO: 10.4 K/UL — SIGNIFICANT CHANGE UP (ref 3.8–10.5)
WBC # FLD AUTO: 11.69 K/UL — HIGH (ref 3.8–10.5)
WBC # FLD AUTO: 13.44 K/UL — HIGH (ref 3.8–10.5)
WBC # FLD AUTO: 13.64 K/UL — HIGH (ref 3.8–10.5)

## 2020-06-23 PROCEDURE — 99233 SBSQ HOSP IP/OBS HIGH 50: CPT | Mod: GC

## 2020-06-23 PROCEDURE — 99232 SBSQ HOSP IP/OBS MODERATE 35: CPT

## 2020-06-23 PROCEDURE — 99232 SBSQ HOSP IP/OBS MODERATE 35: CPT | Mod: GC

## 2020-06-23 RX ORDER — MAGNESIUM SULFATE 500 MG/ML
2 VIAL (ML) INJECTION ONCE
Refills: 0 | Status: COMPLETED | OUTPATIENT
Start: 2020-06-23 | End: 2020-06-23

## 2020-06-23 RX ORDER — SODIUM CHLORIDE 5 G/100ML
1000 INJECTION, SOLUTION INTRAVENOUS
Refills: 0 | Status: DISCONTINUED | OUTPATIENT
Start: 2020-06-23 | End: 2020-06-25

## 2020-06-23 RX ADMIN — Medication 500000 UNIT(S): at 05:53

## 2020-06-23 RX ADMIN — Medication 500000 UNIT(S): at 11:31

## 2020-06-23 RX ADMIN — SODIUM CHLORIDE 1 GRAM(S): 9 INJECTION INTRAMUSCULAR; INTRAVENOUS; SUBCUTANEOUS at 21:26

## 2020-06-23 RX ADMIN — INSULIN GLARGINE 7 UNIT(S): 100 INJECTION, SOLUTION SUBCUTANEOUS at 23:00

## 2020-06-23 RX ADMIN — Medication 50 GRAM(S): at 17:21

## 2020-06-23 RX ADMIN — CHLORHEXIDINE GLUCONATE 1 APPLICATION(S): 213 SOLUTION TOPICAL at 03:01

## 2020-06-23 RX ADMIN — Medication 1 APPLICATION(S): at 05:54

## 2020-06-23 RX ADMIN — NYSTATIN CREAM 1 APPLICATION(S): 100000 CREAM TOPICAL at 05:53

## 2020-06-23 RX ADMIN — NYSTATIN CREAM 1 APPLICATION(S): 100000 CREAM TOPICAL at 17:03

## 2020-06-23 RX ADMIN — Medication 500000 UNIT(S): at 17:03

## 2020-06-23 RX ADMIN — Medication 500000 UNIT(S): at 23:22

## 2020-06-23 RX ADMIN — MIDODRINE HYDROCHLORIDE 20 MILLIGRAM(S): 2.5 TABLET ORAL at 05:53

## 2020-06-23 RX ADMIN — LACTULOSE 30 GRAM(S): 10 SOLUTION ORAL at 11:31

## 2020-06-23 RX ADMIN — ONDANSETRON 4 MILLIGRAM(S): 8 TABLET, FILM COATED ORAL at 14:28

## 2020-06-23 RX ADMIN — SODIUM CHLORIDE 30 MILLILITER(S): 5 INJECTION, SOLUTION INTRAVENOUS at 05:54

## 2020-06-23 RX ADMIN — ONDANSETRON 4 MILLIGRAM(S): 8 TABLET, FILM COATED ORAL at 21:25

## 2020-06-23 RX ADMIN — Medication 1 MILLIGRAM(S): at 11:31

## 2020-06-23 RX ADMIN — ONDANSETRON 4 MILLIGRAM(S): 8 TABLET, FILM COATED ORAL at 05:53

## 2020-06-23 RX ADMIN — Medication 3 UNIT(S): at 16:04

## 2020-06-23 RX ADMIN — TAMSULOSIN HYDROCHLORIDE 0.4 MILLIGRAM(S): 0.4 CAPSULE ORAL at 21:26

## 2020-06-23 RX ADMIN — MIDODRINE HYDROCHLORIDE 20 MILLIGRAM(S): 2.5 TABLET ORAL at 17:03

## 2020-06-23 RX ADMIN — Medication 1 TABLET(S): at 11:31

## 2020-06-23 RX ADMIN — Medication 62.5 MILLIMOLE(S): at 23:27

## 2020-06-23 RX ADMIN — MIDODRINE HYDROCHLORIDE 20 MILLIGRAM(S): 2.5 TABLET ORAL at 11:31

## 2020-06-23 RX ADMIN — SODIUM CHLORIDE 1 GRAM(S): 9 INJECTION INTRAMUSCULAR; INTRAVENOUS; SUBCUTANEOUS at 05:53

## 2020-06-23 RX ADMIN — SODIUM CHLORIDE 1 GRAM(S): 9 INJECTION INTRAMUSCULAR; INTRAVENOUS; SUBCUTANEOUS at 14:28

## 2020-06-23 RX ADMIN — PANTOPRAZOLE SODIUM 40 MILLIGRAM(S): 20 TABLET, DELAYED RELEASE ORAL at 05:53

## 2020-06-23 RX ADMIN — Medication 1 APPLICATION(S): at 16:08

## 2020-06-23 RX ADMIN — PANTOPRAZOLE SODIUM 40 MILLIGRAM(S): 20 TABLET, DELAYED RELEASE ORAL at 17:03

## 2020-06-23 NOTE — PROGRESS NOTE ADULT - ASSESSMENT
Mr. Segundo is a very pleasant 64 Year-Old Gentleman with IDDM, dyslipidemia, obesity, GERD, HFpEF with mild LV diastolic dysfunction, MGUS, and decompensated JEAN cirrhosis presenting with AMS, found to have VRE UTI     Neuro: [place] Grade I hepatic encephalopathy.   - AAOx3, mild asterixis   - monitor mental status   - no Tylenol, narcotics, or benzos    Resp: No active issues.   - On room air saturating well     Cardiac: Orthostatic hypotension.   - Continue with midodrine 20 TID.     GI: Esophagogastroduodenoscopy 6/22 with gastritis, duodenitis with hemorrhage, Grade II esophageal varices.   - CLD as tolerated.   - c/w lactulose, rifaximin.    - c/w ppx   - monitor LFTs  - monitor ammonia   - U/S 6/22 without evidence of ascites.     Renal: MISA on CKD. Hyponatremia  - 2% NS@20cc/hr  - Continue salt tabs 1g q8h.   - Monitor Cr (baseline ~1.1)    Heme: Leukopenia, Anemia, Thrombocytopenia.   - Received Filgrastim.   - Holding DVT prophylaxis.   - Trend INR.   -s/p 2u pRBCs 6/21.     ID: Hx of VRE UTI.   - Appreciate ID recs, antibiotics adjusted to DS Bactrim (from Linezolid).   - c/w rifaximin     Endo: DM.   - monitor FSG   - Lantus 7u, with 3u Humalog Premeal.  - ISS.     Dispo: Surgical Intensive Care Unit.

## 2020-06-23 NOTE — PROGRESS NOTE ADULT - SUBJECTIVE AND OBJECTIVE BOX
SURGICAL INTENSIVE CARE UNIT DAILY PROGRESS NOTE    24 HOUR EVENTS:   - Underwent Esophagogastroduodenoscopy, which showed signes of gastritis, duodenitis with hemorrhage, Grade II esophageal varices.   - Started on 2% NS for hyponatremia.   - Dosed with Filgrastim for leukopenia.   - Linezolid converted for Bactrim for history of VRE.     HPI:  65 yo M PMHx of decompensated JEAN cirrhosis c/b small esophageal varices (12/2019), portal hypertensive gastropathy, ascites, hx SBP, hx hepatic encephalopathy, GAVE syndrome s/p APC, hx duodenal ulcer (5/2019), HTN, HLD, DM, HFpEF (EF 70%), recent ESBL E coli prostate abscess (3/2020 s/p 4 weeks ertapenem), orthostatic hypotension 2/2 midodrine presents with reports of confusion. Saw his hepatologist was noted to be more jaundiced and more confused than normal. Per wife his was not oriented to time and place, and forgot how many grandchildren he had. He also urinated in his bedroom. He was confused for roughly 12 hours. He is now back to his baseline mental status. He denies fever, chills, dysuria, increased urinary frequency, flank pain, neck stiffness. No melena, hematochezia, hematemesis. Patient found to have VRE UTI, being treated with linezolid.     Hospital course: Pt had black tarry BM this AM, first bloody BM this admission Hg dropped to 7 from 7.7. Pt worked with PT and was sat up in bed. He became dizzy and confused, BP measured to be 90/41, then improved to 119/64 when placed supine again. Pt remembers the whole event. He currently is at baseline mental status and eating lunch. Denies CP, SOB, abd pain, N/V/D. EGD 12/2019 showed small nonbleeding esophageal varices, portal hypertensive gastropathy, and GAVE s/p APC. Initially MICU consulted, ICU not indicated at that time. Patient received 1u PRBC on the floor with minimal response in H/H. Transplant team concerned for worsening hepatic encephalopathy and mental status. Patient AAOx3; however, per transplant team having difficulty spelling words. Patient transferred to SICU for closer monitoring. MELD today -34    NEURO: Alert and oriented. Minimal asterixis.     RESPIRATORY  RR: 16 (06-22-20 @ 23:00) (12 - 32)  SpO2: 96% (06-22-20 @ 23:00) (95% - 100%)    CARDIOVASCULAR  HR: 86 (06-22-20 @ 23:00) (80 - 91)  BP: 131/60 (06-22-20 @ 23:00) (98/54 - 145/63)  BP(mean): 87 (06-22-20 @ 23:00) (69 - 95)  VBG - ( 21 Jun 2020 21:17 )  pH: 7.32  /  pCO2: 45    /  pO2: 51    / HCO3: 23    / Base Excess: -2.7  /  SaO2: 82     Lactate: 1.2      GI/NUTRITION  Diet: CLD.     GENITOURINARY  I&O's Detail    06-21 @ 07:01  -  06-22 @ 07:00  --------------------------------------------------------  IN:    Oral Fluid: 1040 mL    Packed Red Blood Cells: 600 mL    Solution: 200 mL  Total IN: 1840 mL    OUT:    Indwelling Catheter - Urethral: 1020 mL    Voided: 600 mL  Total OUT: 1620 mL    Total NET: 220 mL      06-22 @ 07:01  -  06-23 @ 01:05  --------------------------------------------------------  IN:    sodium chloride 2%: 300 mL    sodium chloride 2% .: 280 mL  Total IN: 580 mL    OUT:    Voided: 700 mL  Total OUT: 700 mL    Total NET: -120 mL          06-22    130<L>  |  100  |  49<H>  ----------------------------<  154<H>  4.8   |  19<L>  |  1.40<H>    Ca    10.6<H>      22 Jun 2020 21:48  Phos  3.3     06-22  Mg     1.8     06-22    TPro  6.1  /  Alb  4.1  /  TBili  10.2<H>  /  DBili  2.0<H>  /  AST  25  /  ALT  12  /  AlkPhos  125<H>  06-22      HEMATOLOGIC                        7.8    5.87  )-----------( 32       ( 22 Jun 2020 21:48 )             22.5     PT/INR - ( 22 Jun 2020 03:41 )   PT: 28.8 sec;   INR: 2.46 ratio         PTT - ( 22 Jun 2020 03:41 )  PTT:53.6 sec    INFECTIOUS DISEASES  RECENT CULTURES:  Specimen Source: .Urine Clean Catch (Midstream)  Date/Time: 06-21 @ 21:55  Culture Results:   <10,000 CFU/ml of other organisms  Gram Stain: --  Organism: --  Specimen Source: .Blood Blood-Peripheral  Date/Time: 06-21 @ 21:53  Culture Results:   No growth to date.  Gram Stain: --  Organism: --      ENDOCRINE  CAPILLARY BLOOD GLUCOSE      POCT Blood Glucose.: 164 mg/dL (22 Jun 2020 16:30)  POCT Blood Glucose.: 200 mg/dL (22 Jun 2020 11:36)  POCT Blood Glucose.: 140 mg/dL (22 Jun 2020 07:29)      IMAGING:  < from: Upper Endoscopy (06.22.20 @ 14:24) >  Findings:       Grade II variceswere found in the lower third of the esophagus. Moderate. No signs of recent        bleeding.       The Z-line was regular and was found 45 cm from the incisors.       The exam of the esophagus was otherwise normal.       A medium amount of food (residue) was found in the gastric body. Traces of food were covering        the antral wall, impairing visualization.       Mild portal hypertensive gastropathy was found in the entire examined stomach.       Diffuse severe inflammation with spontaneous oozing of blood characterized by severe        congestion (edema) and erythema was found in the prepyloric region of the stomach. No ulcer        was seen. Consistent with known GAVE.       Localized moderate inflammation with hemorrhage characterizedby erythema was found in the        duodenal bulb.                                                                                                        Impression:          - Grade II esophageal varices.                       - Z-line regular, 45 cm from the incisors.                       - A medium amount of food (residue) in the stomach.                       - Portal hypertensive gastropathy.                       - Acute gastritis with hemorrhage.                       - Acute duodenitis with hemorrhage.                       - No specimens collected.  Recommendation:      - [facility] patient to [facility] for [facility] care.                       - Clear liquid diet.                       - Perform an H. pylori stool antigen (HpSA) test.                       - transfuse blood if Hb decreases below 7 g/dL    < end of copied text >

## 2020-06-23 NOTE — CHART NOTE - NSCHARTNOTEFT_GEN_A_CORE
Nutrition Follow Up Note   Patient seen for: nutrition follow up on  ICU     Source: medical record, pt.     Chart reviewed, events noted. Adm for liver failure, transplant team following. Transferred to SICU 6/21 for closer monitoring. S/P EGD 6/22 (gastritis, duodenitis, grade 2 varices). +MISA    Diet Order: Diet, NPO:   NPO for Procedure/Test     NPO Start Date: 22-Jun-2020,   NPO Start Time: 11:00  Except Medications (06-22-20 @ 13:30)    Nutrition Events: pt reports that appetite is fair, eating ~50%. Very tired this am (wearing sleep mask), declined nutrition education at this time.    GI: denies N/V  had liquid black BM today, on lactulose w/ goal for 3-4 BM/day    Anthropometric Measurements:   Height (cm): 185.4 (06-11-20 @ 19:45), 185.42 (04-27-20 @ 14:53)  Weight (kg): 103 (06-11-20 @ 19:45), 117.9 (04-27-20 @ 14:53)  BMI (kg/m2): 30 (06-11-20 @ 19:45), 34.3 (04-27-20 @ 14:53)  IBW: 83.4 kg      Medications: MEDICATIONS  (STANDING):  chlorhexidine 2% Cloths 1 Application(s) Topical <User Schedule>  epoetin barry-epbx (RETACRIT) Injectable 48523 Unit(s) SubCutaneous <User Schedule>  folic acid 1 milliGRAM(s) Oral daily  hepatitis B Vaccine - Adult (ENGERIX-B) 20 MICROGram(s) IntraMuscular once  insulin glargine Injectable (LANTUS) 7 Unit(s) SubCutaneous at bedtime  insulin lispro (HumaLOG) corrective regimen sliding scale   SubCutaneous three times a day before meals  insulin lispro (HumaLOG) corrective regimen sliding scale   SubCutaneous at bedtime  insulin lispro Injectable (HumaLOG) 3 Unit(s) SubCutaneous three times a day before meals  lactulose Syrup 30 Gram(s) Oral every 6 hours  midodrine. 20 milliGRAM(s) Oral three times a day  nystatin    Suspension 369340 Unit(s) Oral four times a day  nystatin Cream 1 Application(s) Topical two times a day  ondansetron Injectable 4 milliGRAM(s) IV Push every 8 hours  pantoprazole  Injectable 40 milliGRAM(s) IV Push every 12 hours  rifAXIMin 550 milliGRAM(s) Oral two times a day  silver sulfADIAZINE 1% Cream 1 Application(s) Topical two times a day  sodium chloride 1 Gram(s) Oral every 8 hours  sodium chloride 2% . 1000 milliLiter(s) (60 mL/Hr) IV Continuous <Continuous>  tamsulosin 0.4 milliGRAM(s) Oral at bedtime  trimethoprim  160 mG/sulfamethoxazole 800 mG 1 Tablet(s) Oral daily    MEDICATIONS  (PRN):    Labs: 06-23 @ 03:52: Sodium 128<L>, Potassium 4.8, Calcium 10.5, Magnesium 2.0, Phosphorus 3.1, BUN 48<H>, Creatinine 1.45<H>, Glucose 115<H>, Alk Phos 107, ALT/SGPT 13, AST/SGOT 21, Albumin 4.0, Prealbumin --, Total Bilirubin 11.3<H>, Hemoglobin 7.3<L>, Hematocrit 21.3<L>, Ferritin --, C-Reactive Protein --, Creatine Kinase <<27>  06-22 @ 21:48: Sodium 130<L>, Potassium 4.8, Calcium 10.6<H>, Magnesium 1.8, Phosphorus 3.3, BUN 49<H>, Creatinine 1.40<H>, Glucose 154<H>, Alk Phos 125<H>, ALT/SGPT 12, AST/SGOT 25, Albumin 4.1, Prealbumin --, Total Bilirubin 10.2<H>, Hemoglobin 7.8<L>, Hematocrit 22.5<L>, Ferritin --, C-Reactive Protein --, Creatine Kinase <<27>  06-22 @ 16:45: Sodium 130<L>, Potassium 5.0, Calcium 10.6<H>, Magnesium 1.8, Phosphorus 3.6, BUN 51<H>, Creatinine 1.54<H>, Glucose 151<H>, Alk Phos 135<H>, ALT/SGPT 11, AST/SGOT 24, Albumin 4.1, Prealbumin --, Total Bilirubin 9.8<H>, Hemoglobin 7.8<L>, Hematocrit 22.8<L>, Ferritin --, C-Reactive Protein --, Creatine Kinase <<27>  06-22 @ 10:55: Sodium 126<L>, Potassium 4.9, Calcium 10.5, Magnesium 1.7, Phosphorus 3.6, BUN 50<H>, Creatinine 1.63<H>, Glucose 183<H>, Alk Phos 137<H>, ALT/SGPT 11, AST/SGOT 23, Albumin 4.1, Prealbumin --, Total Bilirubin 8.8<H>, Hemoglobin 7.6<L>, Hematocrit 22.4<L>, Ferritin --, C-Reactive Protein --, Creatine Kinase <<27>        POCT Blood Glucose.: 115 mg/dL (06-23-20 @ 07:30)  POCT Blood Glucose.: 164 mg/dL (06-22-20 @ 16:30)  POCT Blood Glucose.: 200 mg/dL (06-22-20 @ 11:36)    Skin: diabetic heel ulcer  Edema: 1+ B/L ankle, feet edema    Estimated Needs: based on IBW 83.4 kg   Energy: 3263-5273 kcals (25-30 kcals/kg)  Protein:  100-117gm (1.2-1.4 gm/kg)    Previous Nutrition Diagnosis: increased needs  Nutrition Diagnosis is: ongoing, pt currently NPO, add supplement when diet is advanced    New Nutrition Diagnosis:  none    Recommended Interventions:   1. When diet resumed, recommend Consistent Carbohydrate, 1 L fluid restriction, add diet mighty shakes for addition kcals, protein  2. Continue folic acid, add multivitamin       Monitoring and Evaluation:   Continue to monitor nutrition provision and tolerance, weights, labs, skin integrity.   RD remains available upon request and will follow up per protocol. Nutrition Follow Up Note   Patient seen for: nutrition follow up on  ICU     Source: medical record, pt.     Chart reviewed, events noted. Adm for liver failure, transplant team following. Transferred to SICU 6/21 for closer monitoring. S/P EGD 6/22 (gastritis, duodenitis, grade 2 varices). +MISA    Diet Order: Diet, NPO:   NPO for Procedure/Test     NPO Start Date: 22-Jun-2020,   NPO Start Time: 11:00  Except Medications (06-22-20 @ 13:30)    Nutrition Events: pt reports that appetite is fair, eating ~50%. Very tired this am (wearing sleep mask), declined nutrition education at this time. Will f/u on education when pt more receptive.    GI: denies N/V  had liquid black BM today, on lactulose w/ goal for 3-4 BM/day    Anthropometric Measurements:   Height (cm): 185.4 (06-11-20 @ 19:45), 185.42 (04-27-20 @ 14:53)  Weight (kg): 103 (06-11-20 @ 19:45), 117.9 (04-27-20 @ 14:53)  BMI (kg/m2): 30 (06-11-20 @ 19:45), 34.3 (04-27-20 @ 14:53)  IBW: 83.4 kg      Medications: MEDICATIONS  (STANDING):  chlorhexidine 2% Cloths 1 Application(s) Topical <User Schedule>  epoetin barry-epbx (RETACRIT) Injectable 33722 Unit(s) SubCutaneous <User Schedule>  folic acid 1 milliGRAM(s) Oral daily  hepatitis B Vaccine - Adult (ENGERIX-B) 20 MICROGram(s) IntraMuscular once  insulin glargine Injectable (LANTUS) 7 Unit(s) SubCutaneous at bedtime  insulin lispro (HumaLOG) corrective regimen sliding scale   SubCutaneous three times a day before meals  insulin lispro (HumaLOG) corrective regimen sliding scale   SubCutaneous at bedtime  insulin lispro Injectable (HumaLOG) 3 Unit(s) SubCutaneous three times a day before meals  lactulose Syrup 30 Gram(s) Oral every 6 hours  midodrine. 20 milliGRAM(s) Oral three times a day  nystatin    Suspension 018574 Unit(s) Oral four times a day  nystatin Cream 1 Application(s) Topical two times a day  ondansetron Injectable 4 milliGRAM(s) IV Push every 8 hours  pantoprazole  Injectable 40 milliGRAM(s) IV Push every 12 hours  rifAXIMin 550 milliGRAM(s) Oral two times a day  silver sulfADIAZINE 1% Cream 1 Application(s) Topical two times a day  sodium chloride 1 Gram(s) Oral every 8 hours  sodium chloride 2% . 1000 milliLiter(s) (60 mL/Hr) IV Continuous <Continuous>  tamsulosin 0.4 milliGRAM(s) Oral at bedtime  trimethoprim  160 mG/sulfamethoxazole 800 mG 1 Tablet(s) Oral daily    MEDICATIONS  (PRN):    Labs: 06-23 @ 03:52: Sodium 128<L>, Potassium 4.8, Calcium 10.5, Magnesium 2.0, Phosphorus 3.1, BUN 48<H>, Creatinine 1.45<H>, Glucose 115<H>, Alk Phos 107, ALT/SGPT 13, AST/SGOT 21, Albumin 4.0, Prealbumin --, Total Bilirubin 11.3<H>, Hemoglobin 7.3<L>, Hematocrit 21.3<L>, Ferritin --, C-Reactive Protein --, Creatine Kinase <<27>  06-22 @ 21:48: Sodium 130<L>, Potassium 4.8, Calcium 10.6<H>, Magnesium 1.8, Phosphorus 3.3, BUN 49<H>, Creatinine 1.40<H>, Glucose 154<H>, Alk Phos 125<H>, ALT/SGPT 12, AST/SGOT 25, Albumin 4.1, Prealbumin --, Total Bilirubin 10.2<H>, Hemoglobin 7.8<L>, Hematocrit 22.5<L>, Ferritin --, C-Reactive Protein --, Creatine Kinase <<27>  06-22 @ 16:45: Sodium 130<L>, Potassium 5.0, Calcium 10.6<H>, Magnesium 1.8, Phosphorus 3.6, BUN 51<H>, Creatinine 1.54<H>, Glucose 151<H>, Alk Phos 135<H>, ALT/SGPT 11, AST/SGOT 24, Albumin 4.1, Prealbumin --, Total Bilirubin 9.8<H>, Hemoglobin 7.8<L>, Hematocrit 22.8<L>, Ferritin --, C-Reactive Protein --, Creatine Kinase <<27>  06-22 @ 10:55: Sodium 126<L>, Potassium 4.9, Calcium 10.5, Magnesium 1.7, Phosphorus 3.6, BUN 50<H>, Creatinine 1.63<H>, Glucose 183<H>, Alk Phos 137<H>, ALT/SGPT 11, AST/SGOT 23, Albumin 4.1, Prealbumin --, Total Bilirubin 8.8<H>, Hemoglobin 7.6<L>, Hematocrit 22.4<L>, Ferritin --, C-Reactive Protein --, Creatine Kinase <<27>        POCT Blood Glucose.: 115 mg/dL (06-23-20 @ 07:30)  POCT Blood Glucose.: 164 mg/dL (06-22-20 @ 16:30)  POCT Blood Glucose.: 200 mg/dL (06-22-20 @ 11:36)    Skin: diabetic heel ulcer  Edema: 1+ B/L ankle, feet edema    Estimated Needs: based on IBW 83.4 kg   Energy: 6230-5286 kcals (25-30 kcals/kg)  Protein:  100-117gm (1.2-1.4 gm/kg)    Previous Nutrition Diagnosis: increased needs  Nutrition Diagnosis is: ongoing, pt currently NPO, add supplement when diet is advanced    New Nutrition Diagnosis:  none    Recommended Interventions:   1. When diet resumed, recommend Consistent Carbohydrate, 1 L fluid restriction, add diet mighty shakes for addition kcals, protein  2. Continue folic acid, add multivitamin       Monitoring and Evaluation:   Continue to monitor nutrition provision and tolerance, weights, labs, skin integrity.   RD remains available upon request and will follow up per protocol.

## 2020-06-23 NOTE — PROGRESS NOTE ADULT - SUBJECTIVE AND OBJECTIVE BOX
INTERVAL HPI/OVERNIGHT EVENTS:    s/p EGD with active bleeding from gastritis/duodenitis, grade II esophageal varices  s/p 1uprbc given today       Allergies    codeine (Anaphylaxis)    Intolerances    General:  No wt loss, fevers, chills, night sweats, fatigue,   Eyes:  Good vision, no reported pain  ENT:  No sore throat, pain, runny nose, dysphagia  CV:  No pain, palpitations, hypo/hypertension  Resp:  No dyspnea, cough, tachypnea, wheezing  GI:  No pain, No nausea, No vomiting, No diarrhea, No constipation, No weight loss, No fever, No pruritis, No rectal bleeding, No tarry stools, No dysphagia,  :  No pain, bleeding, incontinence, nocturia  Muscle:  No pain, weakness  Neuro:  No weakness, tingling, memory problems  Psych:  No fatigue, insomnia, mood problems, depression  Endocrine:  No polyuria, polydipsia, cold/heat intolerance  Heme:  No petechiae, ecchymosis, easy bruisability  Skin:  No rash, tattoos, scars, edema    PHYSICAL EXAM:   Vital Signs:  Vital Signs Last 24 Hrs  T(C): 36.7 (14 Jun 2020 07:53), Max: 36.7 (14 Jun 2020 07:53)  T(F): 98.1 (14 Jun 2020 07:53), Max: 98.1 (14 Jun 2020 07:53)  HR: 79 (14 Jun 2020 07:53) (75 - 80)  BP: 127/77 (14 Jun 2020 07:53) (108/53 - 133/68)  BP(mean): --  RR: 18 (14 Jun 2020 07:53) (17 - 18)  SpO2: 97% (14 Jun 2020 07:53) (97% - 100%)  Daily     Daily I&O's Summary    13 Jun 2020 07:01  -  14 Jun 2020 07:00  --------------------------------------------------------  IN: 350 mL / OUT: 250 mL / NET: 100 mL    14 Jun 2020 07:01  -  14 Jun 2020 12:30  --------------------------------------------------------  IN: 0 mL / OUT: 350 mL / NET: -350 mL        GENERAL:  no distress  HEENT:  NC/AT   ABDOMEN:  Soft, non-tender, non-distended, normoactive bowel sounds  EXTEREMITIES:  + edema  SKIN:  No rash/erythema/ecchymoses/petechiae/wounds/abscess/warm/dry  NEURO:  awake, alert, oriented       LABS:                        9.0    5.07  )-----------( 68       ( 14 Jun 2020 07:17 )             26.4     06-14    131<L>  |  100  |  27<H>  ----------------------------<  141<H>  5.1   |  20<L>  |  1.74<H>    Ca    10.5      14 Jun 2020 07:17    TPro  6.3  /  Alb  3.8  /  TBili  11.0<H>  /  DBili  x   /  AST  24  /  ALT  15  /  AlkPhos  133<H>  06-14    PT/INR - ( 14 Jun 2020 09:19 )   PT: 26.3 sec;   INR: 2.23 ratio         PTT - ( 14 Jun 2020 09:19 )  PTT:44.2 sec    amylase   lipase  RADIOLOGY & ADDITIONAL TESTS:

## 2020-06-23 NOTE — PROGRESS NOTE ADULT - ASSESSMENT
Pt with MISA likely 2/2 pre renal and/or ATN in setting hypotension, less likely HRS (Concepcion>35, no response tx)    #Acute Kidney Injury  - MISA likely 2/2 pre renal ATN in setting hypotension, infection, decrease PO intake, less likely HRS (Concepcion>35, no response tx)  - Urine Na 112 ~ ATN, UPro/Cr 0.2 gram, UA showed UTI w/o protein/rbc.    - Baseline sCr 1.0 in 5/2020, on admission sCr 1.57 (6/11), uptrend to 1.75 on 6/13 and has since fluctuated 1.7-1.9mg/dl. Today Scr improved to 1.45  - Borderline hypotension during hospitalization (on midodrine), no response to albumin/midodrine treated, less likely HRS given Concepcion>35. Good UOP during hospitalization, lasix/aldactone held.    - Prior MISA w/u Dec 2019 showed FLC elevated K/L ratio 2.91 (K>L) and immunofixation showing "one Weak IgA Lambda  Band and One Weak IgG Lambda Band Identified"  - C/w holding diuretics for now. Recommend to hold further albumin for now despite urine Na<35   - Monitor serial bladder sono to r/o urinary retention as bob was removed  - No absolute indication for dialysis, no fluid overload no refractory electrolyte derangement   - Avoid nephrotoxin meds such as NSAIDS, PPI, ACEI/ARB, and contrast agents.  - Renally dose medications per GFR.    #Hyponatremia  - Na 128  - Would fluid restrict 1L, encourage PO intake with protein, can add protein shakes if ok from hepatology standpoint, liberalize salt intake  - Hold diuretics for now  - Will increase 2%HTS to 60cc/hr, check BMP q 6hrs, Goal Na for tmrw is ~135      #Anemia  - checking EPO level and started ARANZA 10K units TIW  - pRBC transfusion as needed    Abelino Patterson   Nephrology Fellow  Pager NS: [phone]/ [facility]: [phone]  (After 5 pm or on weekends please page the on-call fellow)

## 2020-06-23 NOTE — PROGRESS NOTE ADULT - SUBJECTIVE AND OBJECTIVE BOX
Chief Complaint:  Patient is a 64y old  Male who presents with a chief complaint of Liver failure, hepatic encephalopathy (2020 10:12)    Reason for consult: cirrhosis, HE, listed for liver transplant    Interval Events: Pt scoped for GIB, found to have bleeding from gastritis/duodenitis. No transfusions since , Hb stable. MELD-Na 33.     Hospital Medications:  chlorhexidine 2% Cloths 1 Application(s) Topical <User Schedule>  epoetin barry-epbx (RETACRIT) Injectable 34208 Unit(s) SubCutaneous <User Schedule>  folic acid 1 milliGRAM(s) Oral daily  hepatitis B Vaccine - Adult (ENGERIX-B) 20 MICROGram(s) IntraMuscular once  insulin glargine Injectable (LANTUS) 7 Unit(s) SubCutaneous at bedtime  insulin lispro (HumaLOG) corrective regimen sliding scale   SubCutaneous three times a day before meals  insulin lispro (HumaLOG) corrective regimen sliding scale   SubCutaneous at bedtime  insulin lispro Injectable (HumaLOG) 3 Unit(s) SubCutaneous three times a day before meals  lactulose Syrup 30 Gram(s) Oral every 6 hours  midodrine. 20 milliGRAM(s) Oral three times a day  nystatin    Suspension 245249 Unit(s) Oral four times a day  nystatin Cream 1 Application(s) Topical two times a day  ondansetron Injectable 4 milliGRAM(s) IV Push every 8 hours  pantoprazole  Injectable 40 milliGRAM(s) IV Push every 12 hours  rifAXIMin 550 milliGRAM(s) Oral two times a day  silver sulfADIAZINE 1% Cream 1 Application(s) Topical two times a day  sodium chloride 1 Gram(s) Oral every 8 hours  sodium chloride 2% . 1000 milliLiter(s) IV Continuous <Continuous>  tamsulosin 0.4 milliGRAM(s) Oral at bedtime  trimethoprim  160 mG/sulfamethoxazole 800 mG 1 Tablet(s) Oral daily      ROS:   General:  No  fevers, chills, night sweats, fatigue  Eyes:  Good vision, no reported pain  ENT:  No sore throat, pain, runny nose  CV:  No pain, palpitations  Pulm:  No dyspnea, cough  GI:  See HPI, otherwise negative  :  No  incontinence, nocturia  Muscle:  No pain, weakness  Neuro:  No memory problems  Psych:  No insomnia, mood problems, depression  Endocrine:  No polyuria, polydipsia, cold/heat intolerance  Heme:  No petechiae, ecchymosis, easy bruisability  Skin:  No rash    PHYSICAL EXAM:   Vital Signs:  Vital Signs Last 24 Hrs  T(C): 36.6 (2020 11:00), Max: 36.8 (2020 19:00)  T(F): 97.9 (2020 11:00), Max: 98.2 (2020 19:00)  HR: 97 (2020 12:34) (85 - 101)  BP: 118/56 (2020 12:34) (98/54 - 140/64)  BP(mean): 80 (2020 12:34) (69 - 92)  RR: 27 (2020 12:00) (15 - 29)  SpO2: 96% (2020 12:00) (96% - 100%)  Daily     Daily Weight in k.2 (2020 04:00)    GENERAL: no acute distress  NEURO: alert, oriented x 3, minimal asterixis, mild difficulty with concentration tasks  HEENT: anicteric sclera, no conjunctival pallor appreciated  CHEST: no respiratory distress, no accessory muscle use  CARDIAC: regular rate, rhythm  ABDOMEN: soft, non-tender, non-distended, no rebound or guarding  EXTREMITIES: warm, well perfused, no edema  SKIN: no lesions noted    LABS: reviewed                        7.1    11.69 )-----------( 30       ( 2020 10:15 )             20.5     06-23    131<L>  |  100  |  51<H>  ----------------------------<  111<H>  4.9   |  17<L>  |  1.39<H>    Ca    10.9<H>      2020 10:15  Phos  3.2     06-23  Mg     1.9     06-23    TPro  5.7<L>  /  Alb  4.0  /  TBili  12.8<H>  /  DBili  2.2<H>  /  AST  24  /  ALT  13  /  AlkPhos  94  06-23    LIVER FUNCTIONS - ( 2020 10:15 )  Alb: 4.0 g/dL / Pro: 5.7 g/dL / ALK PHOS: 94 U/L / ALT: 13 U/L / AST: 24 U/L / GGT: x             Interval Diagnostic Studies: see sunrise for full report

## 2020-06-23 NOTE — PROGRESS NOTE ADULT - SUBJECTIVE AND OBJECTIVE BOX
Follow Up:  Pre-OLT    Interval History: afebrile. patient has no complaints but somewhat confused. denies dysuria, urinary frequency. Denies cough or shortness of breath.     REVIEW OF SYSTEMS  [  ] ROS unobtainable because:    [ x ] All other systems negative except as noted below    Constitutional:  [ ] fever [ ] chills  [ ] weight loss  [ ] weakness  Skin:  [ ] rash [ ] phlebitis	  Eyes: [ ] icterus [ ] pain  [ ] discharge	  ENMT: [ ] sore throat  [ ] thrush [ ] ulcers [ ] exudates  Respiratory: [ ] dyspnea [ ] hemoptysis [ ] cough [ ] sputum	  Cardiovascular:  [ ] chest pain [ ] palpitations [ ] edema	  Gastrointestinal:  [ ] nausea [ ] vomiting [ ] diarrhea [ ] constipation [ ] pain	  Genitourinary:  [ ] dysuria [ ] frequency [ ] hematuria [ ] discharge [ ] flank pain  [ ] incontinence  Musculoskeletal:  [ ] myalgias [ ] arthralgias [ ] arthritis  [ ] back pain  Neurological:  [ ] headache [ ] seizures  [ ] confusion/altered mental status    Allergies  codeine (Anaphylaxis)        ANTIMICROBIALS:  nystatin    Suspension 650718 four times a day  rifAXIMin 550 two times a day  trimethoprim  160 mG/sulfamethoxazole 800 mG 1 daily      OTHER MEDS:  MEDICATIONS  (STANDING):  epoetin barry-epbx (RETACRIT) Injectable 65082 <User Schedule>  hepatitis B Vaccine - Adult (ENGERIX-B) 20 once  insulin glargine Injectable (LANTUS) 7 at bedtime  insulin lispro (HumaLOG) corrective regimen sliding scale  three times a day before meals  insulin lispro (HumaLOG) corrective regimen sliding scale  at bedtime  insulin lispro Injectable (HumaLOG) 3 three times a day before meals  lactulose Syrup 30 every 6 hours  midodrine. 20 three times a day  ondansetron Injectable 4 every 8 hours  pantoprazole  Injectable 40 every 12 hours  tamsulosin 0.4 at bedtime      Vital Signs Last 24 Hrs  T(C): 36.5 (23 Jun 2020 15:00), Max: 36.8 (22 Jun 2020 19:00)  T(F): 97.7 (23 Jun 2020 15:00), Max: 98.2 (22 Jun 2020 19:00)  HR: 96 (23 Jun 2020 18:00) (85 - 101)  BP: 134/61 (23 Jun 2020 18:00) (106/55 - 140/64)  BP(mean): 88 (23 Jun 2020 18:00) (73 - 92)  RR: 19 (23 Jun 2020 18:00) (15 - 31)  SpO2: 96% (23 Jun 2020 18:00) (96% - 99%)    PHYSICAL EXAMINATION:  General: Alert and Awake, oriented 3x, NAD, jaundice  HEENT: PERRL, EOMI, scleral icterus  Neck: Supple  Cardiac: RRR, No M/R/G  Resp: CTAB, No Wh/Rh/Ra  Abdomen: NBS, NT/ND, No HSM, No rigidity or guarding  MSK: R heel with eschar without surrounding drainage or underlying fluctuance.  1+ LE edema. No Calf tenderness  : bob  Skin: No rashes or lesions. Skin is warm and dry to the touch.   Neuro: Alert and Awake. oriented 3x CN 2-12 Grossly intact. Moves all four extremities spontaneously.  Psych: Calm, Pleasant, Cooperative                          7.4    13.64 )-----------( 27       ( 23 Jun 2020 16:08 )             22.3       06-23    130<L>  |  102  |  54<H>  ----------------------------<  143<H>  5.1   |  17<L>  |  1.36<H>    Ca    10.6<H>      23 Jun 2020 16:08  Phos  3.0     06-23  Mg     1.8     06-23    TPro  5.6<L>  /  Alb  3.8  /  TBili  12.5<H>  /  DBili  2.5<H>  /  AST  23  /  ALT  14  /  AlkPhos  79  06-23          MICROBIOLOGY:  v  .Urine Clean Catch (Midstream)  06-21-20   <10,000 CFU/ml of other organisms  --  --      .Blood Blood-Peripheral  06-21-20   No growth to date.  --  --      .Urine Catheterized  06-14-20   >100,000 CFU/ml Enterococcus faecium (vancomycin resistant)  --  Enterococcus faecium (vancomycin resistant)      .Urine Clean Catch (Midstream)  06-14-20   >100,000 CFU/ml Enterococcus faecium (vancomycin resistant)  --  Enterococcus faecium (vancomycin resistant)      .Blood Blood-Peripheral  06-12-20   No Growth Final  --  --        CMV IgG Antibody: <0.20 U/mL (12-04-19 @ 08:33)  Toxoplasma IgG Screen: <3.0 IU/mL (12-04-19 @ 08:33)          RADIOLOGY:    <The imaging below has been reviewed and visualized by me independently. Findings as detailed in report below>    EXAM:  US ABDOMEN LIMITED                        PROCEDURE DATE:  06/22/2020    No sonographic evidence of ascites.    EXAM:  XR CHEST PORTABLE URGENT 1V                        PROCEDURE DATE:  06/21/2020    The heart is normal in size. Lungs are clear. No pleural effusion. No acute bony pathology could be identified.    EXAM:  MR ANKLE WAW IC RT                        PROCEDURE DATE:  06/18/2020    Evaluation for soft tissue infection is limited without intravenous contrast. Heterogeneous marrow signal without an adjacent skin ulceration or fluid collection to suggest osteomyelitis.  Degenerative changes and denervation edema as above.

## 2020-06-23 NOTE — PROGRESS NOTE ADULT - ASSESSMENT
63 y/o M PMHx decompensated JEAN Cirrhosis on transplant list, DMII, HFpEF, recent admission for ESBL E coli prostatic abscess 2/2 4 wks ertapenem, hx of ESBL UTIs, recent VRE urinary colonization came to hospital for worsening jaundice and episode of confusion, resolved PTA.     CT Pelvis with no evidence of abscess (but noncontrast)  BCx 6/12 with NGTD  UCx 6/14 with VRE  Mental status improved after initiation of linezolid  s/p 7 day empiric coverage for VRE    Noted to have purulent drainage from the right heel eschar by transplant surgery  No obvious drainage at time of my assessment   Podiatry without concerns for infection on reevaluation  MRI of foot without obvious infection or OM    Hypotension on 6/21 and drop in H/H  Blood Cultures (6/21) with NGTD  UCx (6/21) with <10K organisms  EGD on 6/22 with Acute gastritis with hemorrhage and Acute duodenitis with hemorrhage.  Leukocytosis today; could be related to hemorrhage   If any clinical change would reculture and start Meropenem    RECOMMENDATIONS    #Leukocytosis (increasing), Hypotension, Encephalopathy, Positive UCx, VRE Colonization  --If any clinical deterioration would re-culture and start Meropenem  --Continue Bactrim DS PO QD for now for UTI/SBP PPx  --Continue to follow CBC with diff  --Continue to follow temperature curve  --Follow up on repeat blood cultures    #MISA, Prophylactic Antibiotic  --Continue Bactrim DS PO QD for now for UTI/SBP PPx (previous recurrent ESBL E coli infection)  --Appreciate Nephro and transplant hepatology recommendations    #R Heel Eschar  --Monitor off of antibiotics     #Pre-OLT Evaluation, Encounter for Vaccination,  --Would wait on repeat blood cultures from 6/21 prior to ID clearance for OLT although doubt active infection  --Patient will require Daptomycin and Meropenem for danilo-operative prophylaxis  --PCV 13 and 23 vaccination completed February 2020  --Will require HBV Vaccination - can hold off for now    I will continue to follow. Please feel free to contact me with any further questions.    Eusebio Pérez M.D.  [facility] Division of Infectious Disease  8AM-5PM: Pager Number [phone]  After Hours (or if no response): Please contact the Infectious Diseases Office at [phone]     The above assessment and plan were discussed with Dr Letitia Mo (Hepatology), Dr Alexia Ackerman (Transplant Surgery)

## 2020-06-23 NOTE — PROGRESS NOTE ADULT - ASSESSMENT
encepaholapthy  anemia  advance cirrhosis  GIB     plan  hepatology input greatly appreciated  s/p EGD with actively bleeding gastritis/duodenitis. Grade II esophageal varices   s/p 1uprbc today (06/23)   cont antibiotics  diet as tolerated  monitor for signs of bleeding   monitor bm on lactulose  will follow

## 2020-06-23 NOTE — PROGRESS NOTE ADULT - ASSESSMENT
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    JEAN Cirrhosis/Hepatic encephalopathy/ UTI  - VRE UTI, colonization. On 7-day course of linezolid (6/15-21).Continued for today will reevaluate,  Blood cultures negative. Continue Bactrim for UTI PPx (previous recurrent ESBL E coli infection)   - HE -continue rifaximin, Miralax, and lactulose   - S/P EGD shown bleeding duodenitis. GI recs to keep Hgb >7  - s/p 2 units PRBC for symptomatic anemia  - Right heel wound- no signs of infections. XR neg for gas/OM; Foot MRI neg for OM.  - UNOS waitlisted for Liver Transplant.  MELD 33 today    Blood type A negative   - IVF: 2% NACL + NA tabs for hyponatremia, f/u bmp  - Abd US duplex to assess portal vein   - Epogen 3xweek   - Daily CBC, CMP, INR  - Diabetes management per ICU team      Please page [phone] with any questions

## 2020-06-23 NOTE — PROGRESS NOTE ADULT - ATTENDING COMMENTS
Patient with advanced liver failure, anemia,  MISA, hyponatremia, non oliguric, improving creatinine.  May continue furosemide 20 mg daily while serum sodium is <130.  Will follow  I was present during and reviewed clinical and lab data as well as assessment and plan as documented by the house staff as noted. Please contact if any additional questions with any change in clinical condition or on availability of any additional information or reports. Patient with advanced liver failure, anemia,  MISA, hyponatremia, non oliguric, improving creatinine.  May continue 2% saline  while serum sodium is <130.  Will follow  I was present during and reviewed clinical and lab data as well as assessment and plan as documented by the house staff as noted. Please contact if any additional questions with any change in clinical condition or on availability of any additional information or reports.

## 2020-06-23 NOTE — PROGRESS NOTE ADULT - ATTENDING COMMENTS
Dr. Mace (Attending Physician)  Delirium, Hepatic encephalopathy on lactulose and rifaxamin  Hyponatremia increased 2% saline to 60 ml/hr  Esophageal varices will discuss starting betablocker with transplant, duodenitis on ppi  Thrombocytopenic not on dvt ppx  DM on lantus 7 and humalog 110s

## 2020-06-23 NOTE — PROGRESS NOTE ADULT - SUBJECTIVE AND OBJECTIVE BOX
Transplant Surgery    Present:   Patient seen with multidisciplinary team including Transplant Surgeon: Dr. Chris, Dr. Ackerman, Dr. Lucio, Dr. Gray, Transplant Nephrologist Dr Salvador, Nephrology fellow,  NP Patrick and Xavi and examined with Dr. Ackerman.  Disciplines not in attendance will be notified of the plan    HPI:  63 y/o gentleman with IDDM, dyslipidemia, obesity, GERD, HFpEF with mild LV diastolic dysfunction, MGUS, and decompensated JEAN cirrhosis complicated by ascites, history of SBP, hepatic encephalopathy, and chronic anemia with a history of duodenal ulcer as well as GAVE and duodenal AVM s/p APC (last on 10/11/19), UNOS listed for liver transplantation at [facility]. 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 He is now re-admitted with hepatic encephalopathy and VRE UTI being treated w/ linezolid. Also admitted with MISA on CKD., MELD 32    Blood type A negative    Interval Events:   - BP improved post transfusion now 125/60  - Abd US with scant ascites  - S/P EGD noted with Grade II esophageal varices. Acute gastritis with hemorrhage. Acute duodenitis with hemorrhage.  - s/p 2 units PRBC 6/21/20 for downtrending h/h 7/20 now 7.3/21.3   - now AOx-3- slow to respond, vitals stable, afebrile  - On 2% NACL for persistently hyponatremic Na today 128 <- 130 <- 126   - MELD 33   - R heel w/ ulcer; no evidence of infection, evaluated by podiatry, XR and MRI of foot both neg for OM.      Plan of care:  See Below      MEDICATIONS  (STANDING):  chlorhexidine 2% Cloths 1 Application(s) Topical <User Schedule>  epoetin barry-epbx (RETACRIT) Injectable 85109 Unit(s) SubCutaneous <User Schedule>  folic acid 1 milliGRAM(s) Oral daily  hepatitis B Vaccine - Adult (ENGERIX-B) 20 MICROGram(s) IntraMuscular once  insulin glargine Injectable (LANTUS) 7 Unit(s) SubCutaneous at bedtime  insulin lispro (HumaLOG) corrective regimen sliding scale   SubCutaneous three times a day before meals  insulin lispro (HumaLOG) corrective regimen sliding scale   SubCutaneous at bedtime  insulin lispro Injectable (HumaLOG) 3 Unit(s) SubCutaneous three times a day before meals  lactulose Syrup 30 Gram(s) Oral every 6 hours  midodrine. 20 milliGRAM(s) Oral three times a day  nystatin    Suspension 844727 Unit(s) Oral four times a day  nystatin Cream 1 Application(s) Topical two times a day  ondansetron Injectable 4 milliGRAM(s) IV Push every 8 hours  pantoprazole  Injectable 40 milliGRAM(s) IV Push every 12 hours  rifAXIMin 550 milliGRAM(s) Oral two times a day  silver sulfADIAZINE 1% Cream 1 Application(s) Topical two times a day  sodium chloride 1 Gram(s) Oral every 8 hours  sodium chloride 2% . 1000 milliLiter(s) (60 mL/Hr) IV Continuous <Continuous>  tamsulosin 0.4 milliGRAM(s) Oral at bedtime  trimethoprim  160 mG/sulfamethoxazole 800 mG 1 Tablet(s) Oral daily    MEDICATIONS  (PRN):      PAST MEDICAL & SURGICAL HISTORY:  GIB (gastrointestinal bleeding)  GERD with esophagitis: Gastritis &amp; Non Bleeding Ulcers  Hepatic encephalopathy  Obesity  Fatty liver disease, nonalcoholic  Renal stones: 25 years ago  Hypertension  Neuropathy  Hypercholesteremia  Diabetes  S/P cholecystectomy      Vital Signs Last 24 Hrs  T(C): 36.5 (23 Jun 2020 07:00), Max: 36.8 (22 Jun 2020 19:00)  T(F): 97.7 (23 Jun 2020 07:00), Max: 98.2 (22 Jun 2020 19:00)  HR: 101 (23 Jun 2020 10:00) (85 - 101)  BP: 106/55 (23 Jun 2020 10:00) (98/54 - 140/64)  BP(mean): 76 (23 Jun 2020 10:00) (69 - 95)  RR: 26 (23 Jun 2020 10:00) (12 - 29)  SpO2: 99% (23 Jun 2020 10:00) (95% - 100%)    I&O's Summary    22 Jun 2020 07:01  -  23 Jun 2020 07:00  --------------------------------------------------------  IN: 1030 mL / OUT: 700 mL / NET: 330 mL    23 Jun 2020 07:01  -  23 Jun 2020 10:15  --------------------------------------------------------  IN: 180 mL / OUT: 500 mL / NET: -320 mL                              7.3    10.40 )-----------( 29       ( 23 Jun 2020 03:52 )             21.3     06-23    128<L>  |  99  |  48<H>  ----------------------------<  115<H>  4.8   |  19<L>  |  1.45<H>    Ca    10.5      23 Jun 2020 03:52  Phos  3.1     06-23  Mg     2.0     06-23    TPro  5.9<L>  /  Alb  4.0  /  TBili  11.3<H>  /  DBili  2.1<H>  /  AST  21  /  ALT  13  /  AlkPhos  107  06-23          Culture - Urine (collected 06-21-20 @ 21:55)  Source: .Urine Clean Catch (Midstream)  Final Report (06-22-20 @ 17:03):    <10,000 CFU/ml of other organisms    Culture - Blood (collected 06-21-20 @ 21:53)  Source: .Blood Blood-Peripheral  Preliminary Report (06-22-20 @ 22:01):    No growth to date.    Culture - Blood (collected 06-21-20 @ 21:53)  Source: .Blood Blood-Peripheral  Preliminary Report (06-22-20 @ 22:01):    No growth to date.        Review of systems  Gen: No weight changes, fatigue, fevers/chills, weakness  Skin: Jaundice, +R heel ulcer  Head/Eyes/Ears/Mouth: No headache; Normal hearing; Normal vision w/o blurriness; No sinus pain/discomfort.    Respiratory: No dyspnea, cough, wheezing, hemoptysis  CV: No chest pain, PND, orthopnea  GI: denies diarrhea, constipation, nausea, vomiting, melena, hematochezia  : No increased frequency, dysuria, hematuria, nocturia  MSK: No joint pain/swelling; no back pain; no edema  Neuro: No dizziness/lightheadedness, weakness, seizures, numbness, tingling  Heme: No easy bruising or bleeding  Endo: No heat/cold intolerance  Psych: No significant nervousness, anxiety, stress, depression  All other systems were reviewed and are negative, except as noted.        PHYSICAL EXAM:  Constitutional: Well developed / well nourished  Eyes: Anicteric, PERRLA  ENMT: nc/at  Neck: supple  Respiratory: CTA B/L  Cardiovascular: RRR  Abdominal:  soft, NT, ND  Genitourinary: Voiding spontaneously  Extremities: R heel wound w/ overlying eschar w/ no signs of infection.   Vascular: Palpable dp pulses bilaterally  Neurological: A&O x3  Skin: R heel breakdown, no rashes,  lesions   Musculoskeletal: Moving all extremities  Psychiatric: Responsive

## 2020-06-23 NOTE — PROGRESS NOTE ADULT - ATTENDING COMMENTS
no encephalopathy  HD stable  duodenitis with bleeding seen on EGD  requires ongoing blood transfusions to maintai Hb>7

## 2020-06-23 NOTE — PROGRESS NOTE ADULT - SUBJECTIVE AND OBJECTIVE BOX
[facility] DIVISION OF KIDNEY DISEASES AND HYPERTENSION -- FOLLOW UP NOTE  --------------------------------------------------------------------------------  Abelino Patterson   Nephrology Fellow  Pager NS: [phone]/ [facility]: [phone]  (After 5 pm or on weekends please page the on-call fellow)    24 hour events/subjective: Vital signs, labs, medications reviewed. Na 128 this AM, pt was on 2% HTS at 30cc/hr. Went for EGD yest, no available report but noted to have friable mucosa, no active bleeding noted. Hgb 7.3 this AM. BUN/Scr: 48/1.45. ABX course completed        PAST HISTORY  --------------------------------------------------------------------------------  No significant changes to PMH, PSH, FHx, SHx, unless otherwise noted    ALLERGIES & MEDICATIONS  --------------------------------------------------------------------------------  Allergies    codeine (Anaphylaxis)    Intolerances      Standing Inpatient Medications  chlorhexidine 2% Cloths 1 Application(s) Topical <User Schedule>  epoetin barry-epbx (RETACRIT) Injectable 21651 Unit(s) SubCutaneous <User Schedule>  folic acid 1 milliGRAM(s) Oral daily  hepatitis B Vaccine - Adult (ENGERIX-B) 20 MICROGram(s) IntraMuscular once  insulin glargine Injectable (LANTUS) 7 Unit(s) SubCutaneous at bedtime  insulin lispro (HumaLOG) corrective regimen sliding scale   SubCutaneous three times a day before meals  insulin lispro (HumaLOG) corrective regimen sliding scale   SubCutaneous at bedtime  insulin lispro Injectable (HumaLOG) 3 Unit(s) SubCutaneous three times a day before meals  lactulose Syrup 30 Gram(s) Oral every 6 hours  midodrine. 20 milliGRAM(s) Oral three times a day  nystatin    Suspension 520510 Unit(s) Oral four times a day  nystatin Cream 1 Application(s) Topical two times a day  ondansetron Injectable 4 milliGRAM(s) IV Push every 8 hours  pantoprazole  Injectable 40 milliGRAM(s) IV Push every 12 hours  rifAXIMin 550 milliGRAM(s) Oral two times a day  silver sulfADIAZINE 1% Cream 1 Application(s) Topical two times a day  sodium chloride 1 Gram(s) Oral every 8 hours  sodium chloride 2% . 1000 milliLiter(s) IV Continuous <Continuous>  tamsulosin 0.4 milliGRAM(s) Oral at bedtime  trimethoprim  160 mG/sulfamethoxazole 800 mG 1 Tablet(s) Oral daily    PRN Inpatient Medications      REVIEW OF SYSTEMS  --------------------------------------------------------------------------------  Gen: No fevers/chills  Head/Eyes/Ears/Mouth: No headache; Normal hearing; Normal vision    Respiratory: No dyspnea, cough  CV: No chest pain  GI: No abdominal pain, diarrhea, constipation, nausea, vomiting  : No increased frequency, dysuria  MSK: No joint pain/swelling; no edema    All other systems were reviewed and are negative, except as noted.    VITALS/PHYSICAL EXAM  --------------------------------------------------------------------------------  T(C): 36.5 (06-23-20 @ 07:00), Max: 36.8 (06-22-20 @ 19:00)  HR: 98 (06-23-20 @ 09:00) (85 - 98)  BP: 117/56 (06-23-20 @ 09:00) (98/54 - 140/64)  RR: 24 (06-23-20 @ 09:00) (12 - 29)  SpO2: 97% (06-23-20 @ 09:00) (95% - 100%)  Wt(kg): --        06-22-20 @ 07:01  -  06-23-20 @ 07:00  --------------------------------------------------------  IN: 1030 mL / OUT: 700 mL / NET: 330 mL    06-23-20 @ 07:01  -  06-23-20 @ 09:30  --------------------------------------------------------  IN: 120 mL / OUT: 500 mL / NET: -380 mL      Physical Exam:  		  	Gen: NAD, well-appearing on room air  	HEENT: Scleral icterus  	Pulm: CTA B/L, no crackles  	CV: RRR, S1S2; no rub/murmur  	GI: +BS, soft, nontender  	: No suprapubic tenderness  	MSK: Warm, no edema              Neuro: Awake, mild asterixis   	Psych: Normal affect and mood  	Skin: Warm, no cyanosis      LABS/STUDIES  --------------------------------------------------------------------------------              7.3    10.40 >-----------<  29       [06-23-20 @ 03:52]              21.3     128  |  99  |  48  ----------------------------<  115      [06-23-20 @ 03:52]  4.8   |  19  |  1.45        Ca     10.5     [06-23-20 @ 03:52]      Mg     2.0     [06-23-20 @ 03:52]      Phos  3.1     [06-23-20 @ 03:52]    TPro  5.9  /  Alb  4.0  /  TBili  11.3  /  DBili  2.1  /  AST  21  /  ALT  13  /  AlkPhos  107  [06-23-20 @ 03:52]    PT/INR: PT 29.4 , INR 2.49       [06-23-20 @ 03:52]  PTT: 56.2       [06-23-20 @ 03:52]      Creatinine Trend:  SCr 1.45 [06-23 @ 03:52]  SCr 1.40 [06-22 @ 21:48]  SCr 1.54 [06-22 @ 16:45]  SCr 1.63 [06-22 @ 10:55]  SCr 1.71 [06-22 @ 03:41]              Urinalysis - [06-21-20 @ 14:31]      Color  / Appearance  / SG  / pH       Gluc  / Ketone   / Bili  / Urobili        Blood  / Protein  / Leuk Est  / Nitrite        / WBC 23 / Hyaline 0 / Gran  / Sq Epi  / Non Sq Epi 1 / Bacteria Negative    Urine Creatinine 106      [06-21-20 @ 11:33]  Urine Sodium <35      [06-23-20 @ 08:32]  Urine Urea Nitrogen 865      [06-21-20 @ 13:13]  Urine Osmolality 483      [06-23-20 @ 08:32]    Iron 160, TIBC Unable to calculate Test Repeated, %sat Unable to calculate Test Repeated      [06-12-20 @ 09:04]  Ferritin 493      [04-29-20 @ 08:20]  PTH -- (Ca 10.3)      [12-13-19 @ 08:11]   12  Vitamin D (25OH) 25.3      [12-13-19 @ 08:11]  HbA1c 6.4      [02-12-20 @ 17:08]  TSH 3.26      [04-26-20 @ 09:47]  Lipid: chol 71, TG 63, HDL 30, LDL 28      [11-27-19 @ 09:11]    HBsAb Nonreact      [06-13-20 @ 10:35]  HBsAg Nonreact      [06-13-20 @ 10:35]  HBcAb Nonreact      [06-13-20 @ 10:35]

## 2020-06-23 NOTE — PROGRESS NOTE ADULT - ASSESSMENT
64M w/ HFpEF, decompensated JEAN cirrhosis w/ hx of ascites + SBP, GI bleed 2/2 duodenal ulcers, angioectasias, and GAVE, ESBL E. Coli prostatic abscess s/p 4 weeks IV ertapenem, hx of ESBL UTIs and VRE colonization, admitted on 6/11/20 w/ jaundice and confusion, found to have MELD-increase and bacteriuria with positive leukocyte esterase. Mentation improved with Abx.    Impression:  #Cirrhosis 2/2 JEAN, decompensated by ascites; listed for liver transplant  -varices: grade II on EGD 6/22  -ascites: trace on U/S this admission  - hyponatremia: started on NaCl 2%  -HCC: neg on U/S this admission  -HE: present on exam, on rifaximin. Off lactulose as it can cause too much bowel distension during transplant  -MELD-Na 33 on 6/23; listed at 35 from 6/18  - coagulopathy: PLT 29, INR 2.49  - MISA, creat 1.45, possibly renal due to GI bleed  - hyponatremia, Na 126 - getting 2% NaCl by nephrology  #GI bleed – EGD 6/22, bleeding from gastritis/duodenitis  #R posterior heel wound w/ overlying eschar – no signs of infections, XR neg for gas, evaluated by podiatry and ID. MRI w/o contrast limited, but no overt signs of active infection.  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) or Page [phone] ([facility]) from 8am-5pm M-F  AT NIGHT AND ON WEEKENDS:  Please contact on-call GI fellow via answering service ([phone]) 64M w/ HFpEF, decompensated JEAN cirrhosis w/ hx of ascites + SBP, GI bleed 2/2 duodenal ulcers, angioectasias, and GAVE, ESBL E. Coli prostatic abscess s/p 4 weeks IV ertapenem, hx of ESBL UTIs and VRE colonization, admitted on 6/11/20 w/ jaundice and confusion, found to have MELD-increase and bacteriuria with positive leukocyte esterase. Mentation improved with Abx.    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 Off lactulose as it can cause too much bowel distension during transplant  -MELD-Na 33 on 6/23; listed at 35 from 6/18  - coagulopathy: PLT 29, INR 2.49  - MISA, creat 1.45, possibly renal due to GI bleed    #GI bleed with acute blood loss anemia s/p 3U PRBCs (1 on 6/17, 2 on 6/21) and EGD 6/22, bleeding from gastritis/duodenitis, known GAVE  #R posterior heel wound w/ overlying eschar – no signs of infections, XR neg for gas, evaluated by podiatry and ID. MRI w/o contrast limited, but no overt signs of active infection.  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 per ID  # awaiting liver transplant, in SICU    Recommendation:  - transfuse PRBCs for Hb < 7.0 g/dL  - f/u stool H pylori  - c/w miralax  - c/w rifaximin  - f/u transplant ID recs re: antibiotics (c/w Bactrim)  - trend CMP, CBC, INR daily  - agree with NaCl infusion  - continue home midodrine therapy    Ze Dominguez  Gastroenterology Fellow  MONDAY-FRIDAY 8AM-5PM:  Available via Microsoft Teams  Call [phone] ([facility]) or Page [phone] ([facility]) from 8am-5pm M-F  AT NIGHT AND ON WEEKENDS:  Please contact on-call GI fellow via answering service ([phone])

## 2020-06-24 ENCOUNTER — APPOINTMENT (OUTPATIENT)
Dept: HEPATOLOGY | Facility: CLINIC | Age: 65
End: 2020-06-24

## 2020-06-24 LAB
ALBUMIN SERPL ELPH-MCNC: 3.7 G/DL — SIGNIFICANT CHANGE UP (ref 3.3–5)
ALBUMIN SERPL ELPH-MCNC: 3.7 G/DL — SIGNIFICANT CHANGE UP (ref 3.3–5)
ALBUMIN SERPL ELPH-MCNC: 3.9 G/DL — SIGNIFICANT CHANGE UP (ref 3.3–5)
ALP SERPL-CCNC: 67 U/L — SIGNIFICANT CHANGE UP (ref 40–120)
ALP SERPL-CCNC: 67 U/L — SIGNIFICANT CHANGE UP (ref 40–120)
ALP SERPL-CCNC: 71 U/L — SIGNIFICANT CHANGE UP (ref 40–120)
ALT FLD-CCNC: 11 U/L — SIGNIFICANT CHANGE UP (ref 10–45)
ALT FLD-CCNC: 12 U/L — SIGNIFICANT CHANGE UP (ref 10–45)
ALT FLD-CCNC: 14 U/L — SIGNIFICANT CHANGE UP (ref 10–45)
AMMONIA BLD-MCNC: 56 UMOL/L — HIGH (ref 11–55)
ANION GAP SERPL CALC-SCNC: 10 MMOL/L — SIGNIFICANT CHANGE UP (ref 5–17)
ANION GAP SERPL CALC-SCNC: 11 MMOL/L — SIGNIFICANT CHANGE UP (ref 5–17)
ANION GAP SERPL CALC-SCNC: 9 MMOL/L — SIGNIFICANT CHANGE UP (ref 5–17)
APPEARANCE UR: CLEAR — SIGNIFICANT CHANGE UP
APTT BLD: 59.1 SEC — HIGH (ref 27.5–36.3)
AST SERPL-CCNC: 23 U/L — SIGNIFICANT CHANGE UP (ref 10–40)
AST SERPL-CCNC: 24 U/L — SIGNIFICANT CHANGE UP (ref 10–40)
AST SERPL-CCNC: 25 U/L — SIGNIFICANT CHANGE UP (ref 10–40)
BILIRUB DIRECT SERPL-MCNC: 2.1 MG/DL — HIGH (ref 0–0.2)
BILIRUB DIRECT SERPL-MCNC: 2.2 MG/DL — HIGH (ref 0–0.2)
BILIRUB DIRECT SERPL-MCNC: 2.3 MG/DL — HIGH (ref 0–0.2)
BILIRUB INDIRECT FLD-MCNC: 10.3 MG/DL — HIGH (ref 0.2–1)
BILIRUB INDIRECT FLD-MCNC: 10.5 MG/DL — HIGH (ref 0.2–1)
BILIRUB INDIRECT FLD-MCNC: 10.5 MG/DL — HIGH (ref 0.2–1)
BILIRUB SERPL-MCNC: 12.5 MG/DL — HIGH (ref 0.2–1.2)
BILIRUB SERPL-MCNC: 12.6 MG/DL — HIGH (ref 0.2–1.2)
BILIRUB SERPL-MCNC: 12.8 MG/DL — HIGH (ref 0.2–1.2)
BILIRUB UR-MCNC: NEGATIVE — SIGNIFICANT CHANGE UP
BLD GP AB SCN SERPL QL: NEGATIVE — SIGNIFICANT CHANGE UP
BUN SERPL-MCNC: 47 MG/DL — HIGH (ref 7–23)
BUN SERPL-MCNC: 50 MG/DL — HIGH (ref 7–23)
BUN SERPL-MCNC: 52 MG/DL — HIGH (ref 7–23)
CALCIUM SERPL-MCNC: 10.3 MG/DL — SIGNIFICANT CHANGE UP (ref 8.4–10.5)
CALCIUM SERPL-MCNC: 10.4 MG/DL — SIGNIFICANT CHANGE UP (ref 8.4–10.5)
CALCIUM SERPL-MCNC: 10.6 MG/DL — HIGH (ref 8.4–10.5)
CHLORIDE SERPL-SCNC: 102 MMOL/L — SIGNIFICANT CHANGE UP (ref 96–108)
CHLORIDE SERPL-SCNC: 103 MMOL/L — SIGNIFICANT CHANGE UP (ref 96–108)
CHLORIDE SERPL-SCNC: 107 MMOL/L — SIGNIFICANT CHANGE UP (ref 96–108)
CO2 SERPL-SCNC: 19 MMOL/L — LOW (ref 22–31)
COLOR SPEC: YELLOW — SIGNIFICANT CHANGE UP
CREAT SERPL-MCNC: 1.25 MG/DL — SIGNIFICANT CHANGE UP (ref 0.5–1.3)
CREAT SERPL-MCNC: 1.26 MG/DL — SIGNIFICANT CHANGE UP (ref 0.5–1.3)
CREAT SERPL-MCNC: 1.29 MG/DL — SIGNIFICANT CHANGE UP (ref 0.5–1.3)
DIFF PNL FLD: ABNORMAL
FIBRINOGEN PPP-MCNC: 104 MG/DL — LOW (ref 350–510)
GLUCOSE BLDC GLUCOMTR-MCNC: 116 MG/DL — HIGH (ref 70–99)
GLUCOSE BLDC GLUCOMTR-MCNC: 127 MG/DL — HIGH (ref 70–99)
GLUCOSE BLDC GLUCOMTR-MCNC: 129 MG/DL — HIGH (ref 70–99)
GLUCOSE BLDC GLUCOMTR-MCNC: 207 MG/DL — HIGH (ref 70–99)
GLUCOSE SERPL-MCNC: 135 MG/DL — HIGH (ref 70–99)
GLUCOSE SERPL-MCNC: 190 MG/DL — HIGH (ref 70–99)
GLUCOSE SERPL-MCNC: 192 MG/DL — HIGH (ref 70–99)
GLUCOSE UR QL: NEGATIVE — SIGNIFICANT CHANGE UP
HCT VFR BLD CALC: 19.9 % — CRITICAL LOW (ref 39–50)
HCT VFR BLD CALC: 20.4 % — CRITICAL LOW (ref 39–50)
HCT VFR BLD CALC: 21.4 % — LOW (ref 39–50)
HCT VFR BLD CALC: 21.9 % — LOW (ref 39–50)
HEPARINASE TEG R TIME: 8.9 MIN (ref 4.3–8.3)
HGB BLD-MCNC: 6.8 G/DL — CRITICAL LOW (ref 13–17)
HGB BLD-MCNC: 7 G/DL — CRITICAL LOW (ref 13–17)
HGB BLD-MCNC: 7.4 G/DL — LOW (ref 13–17)
HGB BLD-MCNC: 7.6 G/DL — LOW (ref 13–17)
INR BLD: 2.76 RATIO — HIGH (ref 0.88–1.16)
KETONES UR-MCNC: NEGATIVE — SIGNIFICANT CHANGE UP
LEUKOCYTE ESTERASE UR-ACNC: ABNORMAL
MAGNESIUM SERPL-MCNC: 1.9 MG/DL — SIGNIFICANT CHANGE UP (ref 1.6–2.6)
MAGNESIUM SERPL-MCNC: 1.9 MG/DL — SIGNIFICANT CHANGE UP (ref 1.6–2.6)
MAGNESIUM SERPL-MCNC: 2 MG/DL — SIGNIFICANT CHANGE UP (ref 1.6–2.6)
MCHC RBC-ENTMCNC: 33.9 PG — SIGNIFICANT CHANGE UP (ref 27–34)
MCHC RBC-ENTMCNC: 34 PG — SIGNIFICANT CHANGE UP (ref 27–34)
MCHC RBC-ENTMCNC: 34.2 GM/DL — SIGNIFICANT CHANGE UP (ref 32–36)
MCHC RBC-ENTMCNC: 34.2 PG — HIGH (ref 27–34)
MCHC RBC-ENTMCNC: 34.3 GM/DL — SIGNIFICANT CHANGE UP (ref 32–36)
MCHC RBC-ENTMCNC: 34.6 GM/DL — SIGNIFICANT CHANGE UP (ref 32–36)
MCHC RBC-ENTMCNC: 34.7 GM/DL — SIGNIFICANT CHANGE UP (ref 32–36)
MCHC RBC-ENTMCNC: 34.7 PG — HIGH (ref 27–34)
MCV RBC AUTO: 101.5 FL — HIGH (ref 80–100)
MCV RBC AUTO: 98.2 FL — SIGNIFICANT CHANGE UP (ref 80–100)
MCV RBC AUTO: 98.6 FL — SIGNIFICANT CHANGE UP (ref 80–100)
MCV RBC AUTO: 99 FL — SIGNIFICANT CHANGE UP (ref 80–100)
NITRITE UR-MCNC: NEGATIVE — SIGNIFICANT CHANGE UP
NRBC # BLD: 0 /100 WBCS — SIGNIFICANT CHANGE UP (ref 0–0)
PH UR: 5.5 — SIGNIFICANT CHANGE UP (ref 5–8)
PHOSPHATE SERPL-MCNC: 2.3 MG/DL — LOW (ref 2.5–4.5)
PHOSPHATE SERPL-MCNC: 2.6 MG/DL — SIGNIFICANT CHANGE UP (ref 2.5–4.5)
PHOSPHATE SERPL-MCNC: 3.1 MG/DL — SIGNIFICANT CHANGE UP (ref 2.5–4.5)
PLATELET # BLD AUTO: 21 K/UL — LOW (ref 150–400)
PLATELET # BLD AUTO: 23 K/UL — LOW (ref 150–400)
PLATELET # BLD AUTO: 24 K/UL — LOW (ref 150–400)
PLATELET # BLD AUTO: 40 K/UL — LOW (ref 150–400)
PLATELET MAPPING ACTF MAX AMPLITUDE: SIGNIFICANT CHANGE UP MM (ref 2–19)
PLATELET MAPPING ADP MAXIMUM AMPLITUDE: SIGNIFICANT CHANGE UP MM (ref 45–69)
PLATELET MAPPING ADP PERCENT INHIBITION: SIGNIFICANT CHANGE UP % (ref 0–17)
PLATELET MAPPING HKH MAXIMUM AMPLITUDE: <42 MM (ref 53–68)
POTASSIUM SERPL-MCNC: 4.7 MMOL/L — SIGNIFICANT CHANGE UP (ref 3.5–5.3)
POTASSIUM SERPL-MCNC: 4.8 MMOL/L — SIGNIFICANT CHANGE UP (ref 3.5–5.3)
POTASSIUM SERPL-MCNC: 5.1 MMOL/L — SIGNIFICANT CHANGE UP (ref 3.5–5.3)
POTASSIUM SERPL-SCNC: 4.7 MMOL/L — SIGNIFICANT CHANGE UP (ref 3.5–5.3)
POTASSIUM SERPL-SCNC: 4.8 MMOL/L — SIGNIFICANT CHANGE UP (ref 3.5–5.3)
POTASSIUM SERPL-SCNC: 5.1 MMOL/L — SIGNIFICANT CHANGE UP (ref 3.5–5.3)
PROCALCITONIN SERPL-MCNC: 0.13 NG/ML — HIGH (ref 0.02–0.1)
PROT SERPL-MCNC: 5.1 G/DL — LOW (ref 6–8.3)
PROT SERPL-MCNC: 5.2 G/DL — LOW (ref 6–8.3)
PROT SERPL-MCNC: 5.5 G/DL — LOW (ref 6–8.3)
PROT UR-MCNC: ABNORMAL
PROTHROM AB SERPL-ACNC: 32.5 SEC — HIGH (ref 10–12.9)
RAPIDTEG MAXIMUM AMPLITUDE: <40 MM (ref 52–70)
RBC # BLD: 1.96 M/UL — LOW (ref 4.2–5.8)
RBC # BLD: 2.06 M/UL — LOW (ref 4.2–5.8)
RBC # BLD: 2.18 M/UL — LOW (ref 4.2–5.8)
RBC # BLD: 2.22 M/UL — LOW (ref 4.2–5.8)
RBC # FLD: 20.4 % — HIGH (ref 10.3–14.5)
RBC # FLD: 21.5 % — HIGH (ref 10.3–14.5)
RBC # FLD: 21.8 % — HIGH (ref 10.3–14.5)
RBC # FLD: 22.2 % — HIGH (ref 10.3–14.5)
RH IG SCN BLD-IMP: POSITIVE — SIGNIFICANT CHANGE UP
SODIUM SERPL-SCNC: 130 MMOL/L — LOW (ref 135–145)
SODIUM SERPL-SCNC: 133 MMOL/L — LOW (ref 135–145)
SODIUM SERPL-SCNC: 136 MMOL/L — SIGNIFICANT CHANGE UP (ref 135–145)
SP GR SPEC: 1.02 — SIGNIFICANT CHANGE UP (ref 1.01–1.02)
TEG FUNCTIONAL FIBRINOGEN: SIGNIFICANT CHANGE UP MM (ref 15–32)
TEG MAXIMUM AMPLITUDE: <40 MM (ref 52–69)
TEG REACTION TIME: 8.6 MIN — SIGNIFICANT CHANGE UP (ref 4.6–9.1)
UROBILINOGEN FLD QL: NEGATIVE — SIGNIFICANT CHANGE UP
WBC # BLD: 11.02 K/UL — HIGH (ref 3.8–10.5)
WBC # BLD: 11.57 K/UL — HIGH (ref 3.8–10.5)
WBC # BLD: 12.44 K/UL — HIGH (ref 3.8–10.5)
WBC # BLD: 13.11 K/UL — HIGH (ref 3.8–10.5)
WBC # FLD AUTO: 11.02 K/UL — HIGH (ref 3.8–10.5)
WBC # FLD AUTO: 11.57 K/UL — HIGH (ref 3.8–10.5)
WBC # FLD AUTO: 12.44 K/UL — HIGH (ref 3.8–10.5)
WBC # FLD AUTO: 13.11 K/UL — HIGH (ref 3.8–10.5)

## 2020-06-24 PROCEDURE — 99232 SBSQ HOSP IP/OBS MODERATE 35: CPT

## 2020-06-24 PROCEDURE — 93975 VASCULAR STUDY: CPT | Mod: 26

## 2020-06-24 PROCEDURE — 99232 SBSQ HOSP IP/OBS MODERATE 35: CPT | Mod: GC

## 2020-06-24 PROCEDURE — 99233 SBSQ HOSP IP/OBS HIGH 50: CPT | Mod: GC

## 2020-06-24 PROCEDURE — 71045 X-RAY EXAM CHEST 1 VIEW: CPT | Mod: 26

## 2020-06-24 PROCEDURE — 99233 SBSQ HOSP IP/OBS HIGH 50: CPT

## 2020-06-24 RX ORDER — OCTREOTIDE ACETATE 200 UG/ML
50 INJECTION, SOLUTION INTRAVENOUS; SUBCUTANEOUS
Qty: 500 | Refills: 0 | Status: DISCONTINUED | OUTPATIENT
Start: 2020-06-24 | End: 2020-06-27

## 2020-06-24 RX ORDER — MAGNESIUM SULFATE 500 MG/ML
2 VIAL (ML) INJECTION ONCE
Refills: 0 | Status: COMPLETED | OUTPATIENT
Start: 2020-06-24 | End: 2020-06-24

## 2020-06-24 RX ORDER — ACETAMINOPHEN 500 MG
650 TABLET ORAL ONCE
Refills: 0 | Status: COMPLETED | OUTPATIENT
Start: 2020-06-24 | End: 2020-06-24

## 2020-06-24 RX ORDER — LACTULOSE 10 G/15ML
30 SOLUTION ORAL EVERY 6 HOURS
Refills: 0 | Status: DISCONTINUED | OUTPATIENT
Start: 2020-06-24 | End: 2020-07-02

## 2020-06-24 RX ADMIN — TAMSULOSIN HYDROCHLORIDE 0.4 MILLIGRAM(S): 0.4 CAPSULE ORAL at 23:35

## 2020-06-24 RX ADMIN — SODIUM CHLORIDE 1 GRAM(S): 9 INJECTION INTRAMUSCULAR; INTRAVENOUS; SUBCUTANEOUS at 23:34

## 2020-06-24 RX ADMIN — PANTOPRAZOLE SODIUM 40 MILLIGRAM(S): 20 TABLET, DELAYED RELEASE ORAL at 06:19

## 2020-06-24 RX ADMIN — Medication 1 APPLICATION(S): at 06:20

## 2020-06-24 RX ADMIN — Medication 1 APPLICATION(S): at 17:04

## 2020-06-24 RX ADMIN — MIDODRINE HYDROCHLORIDE 20 MILLIGRAM(S): 2.5 TABLET ORAL at 06:19

## 2020-06-24 RX ADMIN — MIDODRINE HYDROCHLORIDE 20 MILLIGRAM(S): 2.5 TABLET ORAL at 11:44

## 2020-06-24 RX ADMIN — Medication 50 GRAM(S): at 16:00

## 2020-06-24 RX ADMIN — LACTULOSE 30 GRAM(S): 10 SOLUTION ORAL at 23:34

## 2020-06-24 RX ADMIN — SODIUM CHLORIDE 1 GRAM(S): 9 INJECTION INTRAMUSCULAR; INTRAVENOUS; SUBCUTANEOUS at 06:18

## 2020-06-24 RX ADMIN — Medication 3 UNIT(S): at 17:18

## 2020-06-24 RX ADMIN — ONDANSETRON 4 MILLIGRAM(S): 8 TABLET, FILM COATED ORAL at 13:58

## 2020-06-24 RX ADMIN — NYSTATIN CREAM 1 APPLICATION(S): 100000 CREAM TOPICAL at 06:20

## 2020-06-24 RX ADMIN — Medication 50 GRAM(S): at 18:40

## 2020-06-24 RX ADMIN — Medication 500000 UNIT(S): at 17:08

## 2020-06-24 RX ADMIN — NYSTATIN CREAM 1 APPLICATION(S): 100000 CREAM TOPICAL at 17:10

## 2020-06-24 RX ADMIN — Medication 650 MILLIGRAM(S): at 17:10

## 2020-06-24 RX ADMIN — Medication 2: at 12:29

## 2020-06-24 RX ADMIN — Medication 3 UNIT(S): at 08:20

## 2020-06-24 RX ADMIN — SODIUM CHLORIDE 1 GRAM(S): 9 INJECTION INTRAMUSCULAR; INTRAVENOUS; SUBCUTANEOUS at 13:58

## 2020-06-24 RX ADMIN — Medication 3 UNIT(S): at 12:29

## 2020-06-24 RX ADMIN — PANTOPRAZOLE SODIUM 40 MILLIGRAM(S): 20 TABLET, DELAYED RELEASE ORAL at 17:04

## 2020-06-24 RX ADMIN — Medication 500000 UNIT(S): at 06:19

## 2020-06-24 RX ADMIN — Medication 1 MILLIGRAM(S): at 11:45

## 2020-06-24 RX ADMIN — MIDODRINE HYDROCHLORIDE 20 MILLIGRAM(S): 2.5 TABLET ORAL at 17:08

## 2020-06-24 RX ADMIN — ERYTHROPOIETIN 10000 UNIT(S): 10000 INJECTION, SOLUTION INTRAVENOUS; SUBCUTANEOUS at 15:19

## 2020-06-24 RX ADMIN — Medication 500000 UNIT(S): at 23:34

## 2020-06-24 RX ADMIN — CHLORHEXIDINE GLUCONATE 1 APPLICATION(S): 213 SOLUTION TOPICAL at 00:02

## 2020-06-24 RX ADMIN — ONDANSETRON 4 MILLIGRAM(S): 8 TABLET, FILM COATED ORAL at 06:19

## 2020-06-24 RX ADMIN — Medication 62.5 MILLIMOLE(S): at 21:53

## 2020-06-24 RX ADMIN — Medication 500000 UNIT(S): at 11:44

## 2020-06-24 RX ADMIN — ONDANSETRON 4 MILLIGRAM(S): 8 TABLET, FILM COATED ORAL at 23:34

## 2020-06-24 RX ADMIN — LACTULOSE 30 GRAM(S): 10 SOLUTION ORAL at 11:43

## 2020-06-24 RX ADMIN — LACTULOSE 30 GRAM(S): 10 SOLUTION ORAL at 17:08

## 2020-06-24 RX ADMIN — Medication 1 TABLET(S): at 11:45

## 2020-06-24 NOTE — PROGRESS NOTE ADULT - SUBJECTIVE AND OBJECTIVE BOX
SICU DAILY PROGRESS NOTE    63 yo M PMHx of decompensated JEAN cirrhosis c/b small esophageal varices (12/2019), portal hypertensive gastropathy, ascites, hx SBP, hx hepatic encephalopathy, GAVE syndrome s/p APC, hx duodenal ulcer (5/2019), HTN, HLD, DM, HFpEF (EF 70%), recent ESBL E coli prostate abscess (3/2020 s/p 4 weeks ertapenem), orthostatic hypotension 2/2 midodrine presents with reports of confusion. Saw his hepatologist was noted to be more jaundiced and more confused than normal. Per wife his was not oriented to time and place, and forgot how many grandchildren he had. He also urinated in his bedroom. He was confused for roughly 12 hours. He is now back to his baseline mental status. He denies fever, chills, dysuria, increased urinary frequency, flank pain, neck stiffness. No melena, hematochezia, hematemesis. Patient found to have VRE UTI, being treated with linezolid.     Hospital course: Pt had black tarry BM this AM, first bloody BM this admission Hg dropped to 7 from 7.7. Pt worked with PT and was sat up in bed. He became dizzy and confused, BP measured to be 90/41, then improved to 119/64 when placed supine again. Pt remembers the whole event. He currently is at baseline mental status and eating lunch. Denies CP, SOB, abd pain, N/V/D. EGD 12/2019 showed small nonbleeding esophageal varices, portal hypertensive gastropathy, and GAVE s/p APC. Initially MICU consulted, ICU not indicated at that time. Patient received 1u PRBC on the floor with minimal response in H/H. Transplant team concerned for worsening hepatic encephalopathy and mental status. Patient AAOx3; however, per transplant team having difficulty spelling words. Patient transferred to SICU for closer monitoring.    24 HOUR EVENTS:  - More confused compared to yesterday  - Diet advanced to clears  - Transfused 1 unit pRBC with inadequate response, H/H continues to drift lower    SUBJECTIVE/ROS:  [ ] A ten-point review of systems was otherwise negative except as noted.  [ ] Due to altered mental status/intubation, subjective information were not able to be obtained from the patient. History was obtained, to the extent possible, from review of the chart and collateral sources of information.      NEURO  RASS:     GCS:     CAM ICU:  Exam: awake, alert, oriented x4  Meds: ondansetron Injectable 4 milliGRAM(s) IV Push every 8 hours    [x] Adequacy of sedation and pain control has been assessed and adjusted      RESPIRATORY  RR: 23 (06-24-20 @ 01:00) (15 - 31)  SpO2: 97% (06-24-20 @ 01:00) (96% - 99%)  Wt(kg): --  Exam: unlabored, clear to auscultation bilaterally  Mechanical Ventilation:     [N/A] Extubation Readiness Assessed  Meds:       CARDIOVASCULAR  HR: 99 (06-24-20 @ 01:00) (88 - 101)  BP: 133/59 (06-24-20 @ 01:00) (106/55 - 142/63)  BP(mean): 85 (06-24-20 @ 01:00) (73 - 107)  ABP: --  ABP(mean): --  Wt(kg): --  CVP(cm H2O): --      Exam: regular rate and rhythm  Cardiac Rhythm: sinus  Perfusion     [x]Adequate   [ ]Inadequate  Mentation   [x]Normal       [ ]Reduced  Extremities  [x]Warm         [ ]Cool  Volume Status [ ]Hypervolemic [x]Euvolemic [ ]Hypovolemic  Meds: midodrine. 20 milliGRAM(s) Oral three times a day  tamsulosin 0.4 milliGRAM(s) Oral at bedtime        GI/NUTRITION  Exam: soft, nontender, distended  Diet: clears  Meds: lactulose Syrup 30 Gram(s) Oral every 6 hours  pantoprazole  Injectable 40 milliGRAM(s) IV Push every 12 hours      GENITOURINARY  I&O's Detail    06-22 @ 07:01  -  06-23 @ 07:00  --------------------------------------------------------  IN:    Oral Fluid: 250 mL    sodium chloride 2%: 430 mL    sodium chloride 2%: 300 mL    Solution: 50 mL  Total IN: 1030 mL    OUT:    Voided: 700 mL  Total OUT: 700 mL    Total NET: 330 mL      06-23 @ 07:01  -  06-24 @ 01:48  --------------------------------------------------------  IN:    Oral Fluid: 200 mL    Packed Red Blood Cells: 350 mL    sodium chloride 2% .: 900 mL  Total IN: 1450 mL    OUT:    Voided: 1250 mL  Total OUT: 1250 mL    Total NET: 200 mL          06-23    130<L>  |  102  |  56<H>  ----------------------------<  206<H>  5.2   |  18<L>  |  1.39<H>    Ca    10.7<H>      23 Jun 2020 21:38  Phos  2.7     06-23  Mg     2.2     06-23    TPro  5.7<L>  /  Alb  4.0  /  TBili  13.1<H>  /  DBili  2.4<H>  /  AST  23  /  ALT  13  /  AlkPhos  74  06-23    [ ] Beasley catheter, indication: N/A  Meds: folic acid 1 milliGRAM(s) Oral daily  sodium chloride 1 Gram(s) Oral every 8 hours  sodium chloride 2% . 1000 milliLiter(s) IV Continuous <Continuous>        HEMATOLOGIC  Meds:   [x] VTE Prophylaxis                        7.1    13.44 )-----------( 22       ( 23 Jun 2020 21:38 )             21.3     PT/INR - ( 23 Jun 2020 03:52 )   PT: 29.4 sec;   INR: 2.49 ratio         PTT - ( 23 Jun 2020 03:52 )  PTT:56.2 sec  Transfusion     [ ] PRBC   [ ] Platelets   [ ] FFP   [ ] Cryoprecipitate      INFECTIOUS DISEASES  WBC Count: 13.44 K/uL (06-23 @ 21:38)  WBC Count: 13.64 K/uL (06-23 @ 16:08)  WBC Count: 11.69 K/uL (06-23 @ 10:15)  WBC Count: 10.40 K/uL (06-23 @ 03:52)    RECENT CULTURES:  Specimen Source: .Urine Clean Catch (Midstream)  Date/Time: 06-21 @ 21:55  Culture Results:   <10,000 CFU/ml of other organisms  Gram Stain: --  Organism: --  Specimen Source: .Blood Blood-Peripheral  Date/Time: 06-21 @ 21:53  Culture Results:   No growth to date.  Gram Stain: --  Organism: --    Meds: epoetin barry-epbx (RETACRIT) Injectable 88628 Unit(s) SubCutaneous <User Schedule>  hepatitis B Vaccine - Adult (ENGERIX-B) 20 MICROGram(s) IntraMuscular once  nystatin    Suspension 721494 Unit(s) Oral four times a day  rifAXIMin 550 milliGRAM(s) Oral two times a day  trimethoprim  160 mG/sulfamethoxazole 800 mG 1 Tablet(s) Oral daily        ENDOCRINE  CAPILLARY BLOOD GLUCOSE      POCT Blood Glucose.: 140 mg/dL (23 Jun 2020 16:01)  POCT Blood Glucose.: 124 mg/dL (23 Jun 2020 11:30)  POCT Blood Glucose.: 115 mg/dL (23 Jun 2020 07:30)    Meds: insulin glargine Injectable (LANTUS) 7 Unit(s) SubCutaneous at bedtime  insulin lispro (HumaLOG) corrective regimen sliding scale   SubCutaneous three times a day before meals  insulin lispro (HumaLOG) corrective regimen sliding scale   SubCutaneous at bedtime  insulin lispro Injectable (HumaLOG) 3 Unit(s) SubCutaneous three times a day before meals        ACCESS DEVICES:  [ ] Peripheral IV  [ ] Central Venous Line	[ ] R	[ ] L	[ ] IJ	[ ] Fem	[ ] SC	Placed:   [ ] Arterial Line		[ ] R	[ ] L	[ ] Fem	[ ] Rad	[ ] Ax	Placed:   [ ] PICC:					[ ] Mediport  [ ] Urinary Catheter, Date Placed:   [x] Necessity of urinary, arterial, and venous catheters discussed    OTHER MEDICATIONS:  chlorhexidine 2% Cloths 1 Application(s) Topical <User Schedule>  nystatin Cream 1 Application(s) Topical two times a day  silver sulfADIAZINE 1% Cream 1 Application(s) Topical two times a day      CODE STATUS:      IMAGING:

## 2020-06-24 NOTE — PROGRESS NOTE ADULT - ASSESSMENT
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     CT Pelvis with no evidence of abscess (but noncontrast)  BCx 6/12 with NGTD  UCx 6/14 with VRE  Mental status improved after initiation of linezolid  s/p 7 day empiric coverage for VRE    Noted to have purulent drainage from the right heel eschar by transplant surgery  No obvious drainage at time of my assessments  Podiatry without concerns for infection on reevaluation  MRI of foot without obvious infection or OM    Hypotension on 6/21 and drop in H/H  Blood Cultures (6/21) with NGTD  UCx (6/21) with <10K organisms  EGD on 6/22 with Acute gastritis with hemorrhage and Acute duodenitis with hemorrhage.    Has persistent low grade leukocytosis and now some encephalopathy which could be related to hemorrhage   8ICU to obtain blood cultures and U/A and UCx  If any clinical change I would restart Meropenem pending infectious workup  I would weigh risks and benefits for proceeding with OLT; patient has leukocytosis and encephalopathy which may be due to GIB but obtaining new infectious workup today.     RECOMMENDATIONS    #Leukocytosis (increasing), Hypotension, Encephalopathy, Positive UCx, VRE Colonization  --To have blood cultures and U/A with UCx sent today.   --If fever or any further clinical change would start Meropenem.   --Continue Bactrim DS PO QD for now for UTI/SBP PPx  --Continue to follow CBC with diff  --Continue to follow temperature curve  --Follow up on repeat blood cultures    #MISA, Prophylactic Antibiotic  --Continue Bactrim DS PO QD for now for UTI/SBP PPx (previous recurrent ESBL E coli infection)  --Appreciate Nephro and transplant hepatology recommendations    #R Heel Eschar  --Monitor off of antibiotics     #Pre-OLT Evaluation, Encounter for Vaccination,  --I would weigh risks and benefits for proceeding with OLT; patient has leukocytosis and encephalopathy which may be due to GIB but obtaining new infectious workup today.   --Patient will require Daptomycin and Meropenem for danilo-operative prophylaxis  --PCV 13 and 23 vaccination completed February 2020  --Will require HBV Vaccination - can hold off for now    I will continue to follow. Please feel free to contact me with any further questions.    Eusebio Pérez M.D.  [facility] [place] [facility] of Infectious Disease  8AM-5PM: Pager Number [phone]  After Hours (or if no response): Please contact the Infectious Diseases Office at [phone]     The above assessment and plan were discussed with Dr Letitia Mo (Transplant Hepatology), Zack Gray (Transplant Surgery)

## 2020-06-24 NOTE — PROGRESS NOTE ADULT - SUBJECTIVE AND OBJECTIVE BOX
[facility] DIVISION OF KIDNEY DISEASES AND HYPERTENSION -- FOLLOW UP NOTE  --------------------------------------------------------------------------------  Abelino Patterson   Nephrology Fellow  Pager NS: [phone]/ [facility]: [phone]  (After 5 pm or on weekends please page the on-call fellow)    24 hour events/subjective: Patient received 1u pRBC overnight for ongoing drop in H/H, Hgb 6.8-> 7.4 after the unit. Na 133 on 2% HTS at 60cc/hr, Scr improving to 1.29mg/dl. Patient is more confused this AM. MELD 31        PAST HISTORY  --------------------------------------------------------------------------------  No significant changes to PMH, PSH, FHx, SHx, unless otherwise noted    ALLERGIES & MEDICATIONS  --------------------------------------------------------------------------------  Allergies    codeine (Anaphylaxis)    Intolerances      Standing Inpatient Medications  chlorhexidine 2% Cloths 1 Application(s) Topical <User Schedule>  epoetin barry-epbx (RETACRIT) Injectable 26168 Unit(s) SubCutaneous <User Schedule>  folic acid 1 milliGRAM(s) Oral daily  hepatitis B Vaccine - Adult (ENGERIX-B) 20 MICROGram(s) IntraMuscular once  insulin glargine Injectable (LANTUS) 7 Unit(s) SubCutaneous at bedtime  insulin lispro (HumaLOG) corrective regimen sliding scale   SubCutaneous three times a day before meals  insulin lispro (HumaLOG) corrective regimen sliding scale   SubCutaneous at bedtime  insulin lispro Injectable (HumaLOG) 3 Unit(s) SubCutaneous three times a day before meals  lactulose Syrup 30 Gram(s) Oral every 6 hours  midodrine. 20 milliGRAM(s) Oral three times a day  nystatin    Suspension [id number] Unit(s) Oral four times a day  nystatin Cream 1 Application(s) Topical two times a day  ondansetron Injectable 4 milliGRAM(s) IV Push every 8 hours  pantoprazole  Injectable 40 milliGRAM(s) IV Push every 12 hours  rifAXIMin 550 milliGRAM(s) Oral two times a day  silver sulfADIAZINE 1% Cream 1 Application(s) Topical two times a day  sodium chloride 1 Gram(s) Oral every 8 hours  sodium chloride 2% . 1000 milliLiter(s) IV Continuous <Continuous>  tamsulosin 0.4 milliGRAM(s) Oral at bedtime  trimethoprim  160 mG/sulfamethoxazole 800 mG 1 Tablet(s) Oral daily    PRN Inpatient Medications      REVIEW OF SYSTEMS  --------------------------------------------------------------------------------  Gen: No fevers/chills  Head/Eyes/Ears/Mouth: No headache; Normal hearing; Normal vision    Respiratory: No dyspnea, cough  CV: No chest pain  GI: No abdominal pain, diarrhea, constipation, nausea, vomiting  : No increased frequency, dysuria  MSK: No joint pain/swelling; no edema    All other systems were reviewed and are negative, except as noted.    VITALS/PHYSICAL EXAM  --------------------------------------------------------------------------------  T(C): 36.5 (06-24-20 @ 07:00), Max: 36.8 (06-23-20 @ 19:00)  HR: 94 (06-24-20 @ 09:00) (94 - 101)  BP: 111/52 (06-24-20 @ 09:00) (103/55 - 142/63)  RR: 16 (06-24-20 @ 09:00) (11 - 31)  SpO2: 99% (06-24-20 @ 09:00) (96% - 100%)  Wt(kg): --        06-23-20 @ 07:01  -  06-24-20 @ 07:00  --------------------------------------------------------  IN: 2050 mL / OUT: 1650 mL / NET: 400 mL    06-24-20 @ 07:01  -  06-24-20 @ 09:45  --------------------------------------------------------  IN: 620 mL / OUT: 450 mL / NET: 170 mL      Physical Exam:  	  	Gen: NAD, well-appearing on room air  	HEENT: Scleral icterus  	Pulm: CTA B/L, no crackles  	CV: RRR, S1S2; no rub/murmur  	GI: +BS, soft, nontender  	: No suprapubic tenderness  	MSK: Warm, no edema              Neuro: AOx2, + asterixis   	Psych: Normal affect and mood  	Skin: Warm, no cyanosis    LABS/STUDIES  --------------------------------------------------------------------------------              7.4    12.44 >-----------<  23       [06-24-20 @ 07:05]              21.4     133  |  103  |  52  ----------------------------<  190      [06-24-20 @ 03:24]  5.1   |  19  |  1.29        Ca     10.6     [06-24-20 @ 03:24]      Mg     2.0     [06-24-20 @ 03:24]      Phos  3.1     [06-24-20 @ 03:24]    TPro  5.5  /  Alb  3.9  /  TBili  12.8  /  DBili  2.3  /  AST  23  /  ALT  12  /  AlkPhos  71  [06-24-20 @ 03:24]    PT/INR: PT 32.5 , INR 2.76       [06-24-20 @ 03:24]  PTT: 59.1       [06-24-20 @ 03:24]      Creatinine Trend:  SCr 1.29 [06-24 @ 03:24]  SCr 1.39 [06-23 @ 21:38]  SCr 1.36 [06-23 @ 16:08]  SCr 1.39 [06-23 @ 10:15]  SCr 1.45 [06-23 @ 03:52]              Urinalysis - [06-21-20 @ 14:31]      Color  / Appearance  / SG  / pH       Gluc  / Ketone   / Bili  / Urobili        Blood  / Protein  / Leuk Est  / Nitrite        / WBC 23 / Hyaline 0 / Gran  / Sq Epi  / Non Sq Epi 1 / Bacteria Negative    Urine Creatinine 106      [06-21-20 @ 11:33]  Urine Sodium <35      [06-23-20 @ 08:32]  Urine Urea Nitrogen 865      [06-21-20 @ 13:13]  Urine Osmolality 483      [06-23-20 @ 08:32]    Iron 160, TIBC Unable to calculate Test Repeated, %sat Unable to calculate Test Repeated      [06-12-20 @ 09:04]  Ferritin 493      [04-29-20 @ 08:20]  PTH -- (Ca 10.3)      [12-13-19 @ 08:11]   12  Vitamin D (25OH) 25.3      [12-13-19 @ 08:11]  HbA1c 6.4      [02-12-20 @ 17:08]  TSH 3.26      [04-26-20 @ 09:47]  Lipid: chol 71, TG 63, HDL 30, LDL 28      [11-27-19 @ 09:11]    HBsAb Nonreact      [06-13-20 @ 10:35]  HBsAg Nonreact      [06-13-20 @ 10:35]  HBcAb Nonreact      [06-13-20 @ 10:35]

## 2020-06-24 NOTE — PROGRESS NOTE ADULT - SUBJECTIVE AND OBJECTIVE BOX
Chief Complaint:  Patient is a 64y old  Male who presents with a chief complaint of Liver failure, hepatic encephalopathy (2020 09:45)    Reason for consult: cirrhosis, HE, transplant list    Interval Events: Pt receiving 2u pRBC, plts, and FFP today for continued downtrending of Hb. Remains in SICU.     Hospital Medications:  chlorhexidine 2% Cloths 1 Application(s) Topical <User Schedule>  epoetin barry-epbx (RETACRIT) Injectable 15889 Unit(s) SubCutaneous <User Schedule>  folic acid 1 milliGRAM(s) Oral daily  hepatitis B Vaccine - Adult (ENGERIX-B) 20 MICROGram(s) IntraMuscular once  insulin glargine Injectable (LANTUS) 7 Unit(s) SubCutaneous at bedtime  insulin lispro (HumaLOG) corrective regimen sliding scale   SubCutaneous three times a day before meals  insulin lispro (HumaLOG) corrective regimen sliding scale   SubCutaneous at bedtime  insulin lispro Injectable (HumaLOG) 3 Unit(s) SubCutaneous three times a day before meals  lactulose Syrup 30 Gram(s) Oral every 6 hours  magnesium sulfate  IVPB 2 Gram(s) IV Intermittent once  midodrine. 20 milliGRAM(s) Oral three times a day  nystatin    Suspension [id number] Unit(s) Oral four times a day  nystatin Cream 1 Application(s) Topical two times a day  ondansetron Injectable 4 milliGRAM(s) IV Push every 8 hours  pantoprazole  Injectable 40 milliGRAM(s) IV Push every 12 hours  rifAXIMin 550 milliGRAM(s) Oral two times a day  silver sulfADIAZINE 1% Cream 1 Application(s) Topical two times a day  sodium chloride 1 Gram(s) Oral every 8 hours  sodium chloride 2% . 1000 milliLiter(s) IV Continuous <Continuous>  tamsulosin 0.4 milliGRAM(s) Oral at bedtime  trimethoprim  160 mG/sulfamethoxazole 800 mG 1 Tablet(s) Oral daily      ROS:   General:  No  fevers, chills, night sweats, fatigue  Eyes:  Good vision, no reported pain  ENT:  No sore throat, pain, runny nose  CV:  No pain, palpitations  Pulm:  No dyspnea, cough  GI:  See HPI, otherwise negative  :  No  incontinence, nocturia  Muscle:  No pain, weakness  Neuro:  No memory problems  Psych:  No insomnia, mood problems, depression  Endocrine:  No polyuria, polydipsia, cold/heat intolerance  Heme:  No petechiae, ecchymosis, easy bruisability  Skin:  No rash    PHYSICAL EXAM:   Vital Signs:  Vital Signs Last 24 Hrs  T(C): 36.5 (2020 07:00), Max: 36.8 (2020 19:00)  T(F): 97.7 (2020 07:00), Max: 98.2 (2020 19:00)  HR: 97 (2020 12:00) (94 - 101)  BP: 130/62 (2020 12:00) (103/55 - 142/63)  BP(mean): 89 (2020 12:00) (73 - 107)  RR: 17 (2020 12:00) (10 - 31)  SpO2: 98% (2020 12:00) (96% - 100%)  Daily     Daily Weight in k.7 (2020 01:11)    GENERAL: no acute distress  NEURO: alert, + mild asterixis  HEENT: icteric sclera, no conjunctival pallor appreciated  CHEST: no respiratory distress, no accessory muscle use  CARDIAC: regular rate, rhythm  ABDOMEN: soft, non-tender, non-distended, no rebound or guarding  EXTREMITIES: warm, well perfused, no edema  SKIN: ++ jaundice    LABS: reviewed                        7.0    11.02 )-----------( 21       ( 2020 10:35 )             20.4     06-24    130<L>  |  102  |  50<H>  ----------------------------<  192<H>  4.8   |  19<L>  |  1.26    Ca    10.3      2020 10:35  Phos  2.6     06-24  Mg     1.9     06-24    TPro  5.1<L>  /  Alb  3.7  /  TBili  12.5<H>  /  DBili  2.2<H>  /  AST  25  /  ALT  11  /  AlkPhos  67  06-24    LIVER FUNCTIONS - ( 2020 10:35 )  Alb: 3.7 g/dL / Pro: 5.1 g/dL / ALK PHOS: 67 U/L / ALT: 11 U/L / AST: 25 U/L / GGT: x             Interval Diagnostic Studies: see sunrise for full report

## 2020-06-24 NOTE — PROGRESS NOTE ADULT - ASSESSMENT
Mr. Segundo is a very pleasant 64 Year-Old Gentleman with IDDM, dyslipidemia, obesity, GERD, HFpEF with mild LV diastolic dysfunction, MGUS, and decompensated JEAN cirrhosis presenting with AMS, found to have VRE UTI     Neuro: [place] Grade I hepatic encephalopathy  - AAOx4, mild asterixis   - monitor mental status   - no Tylenol, narcotics, or benzos    Resp: No active issues  - On room air saturating well     Cardiac: Orthostatic hypotension  - Continue with midodrine 20 TID    GI: Esophagogastroduodenoscopy 6/22 with gastritis, duodenitis with hemorrhage, Grade II esophageal varices  - CLD as tolerated  - c/w lactulose, rifaximin  - c/w Protonix BID  - monitor LFTs  - monitor ammonia   - U/S 6/22 without evidence of ascites, pending portal vein duplex  - Hepatitis B vaccine pending  - Folate    Renal: MISA on CKD. Hyponatremia  - 2% NS@60cc/hr as per nephrology  - Continue salt tabs 1g q8h  - Monitor Cr (baseline ~1.1)  - Epogen  - Tamsulosin 0.4    Heme: Leukopenia, Anemia, Thrombocytopenia  - Received Filgrastim  - Holding DVT prophylaxis  - Trend INR  - s/p 2u pRBCs 6/21, 1u pRBC 6/23    ID: Hx of VRE UTI  - Appreciate ID recs, antibiotics adjusted to DS Bactrim (from Linezolid)  - c/w rifaximin     Endo: DM  - monitor FSG   - Lantus 7u, with 3u Humalog Premeal  - ISS

## 2020-06-24 NOTE — PROGRESS NOTE ADULT - ASSESSMENT
64M w/ HFpEF, decompensated JEAN cirrhosis w/ hx of ascites + SBP, GI bleed 2/2 duodenal ulcers, angioectasias, and GAVE, ESBL E. Coli prostatic abscess s/p 4 weeks IV ertapenem, hx of ESBL UTIs and VRE colonization, admitted on 6/11/20 w/ jaundice and confusion, found to have MELD-increase and bacteriuria with positive leukocyte esterase. Mentation improved with Abx.    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 Off lactulose as it can cause too much bowel distension during transplant  -MELD-Na 31 on 6/24; listed at 35 from 6/18  - coagulopathy: PLT 29, INR 2.49  - MISA, creat 1.26 improving, possibly renal due to GI bleed responding to transfusions  #GI bleed with acute blood loss anemia s/p 6U PRBCs (1 on 6/17, 2 on 6/21, 1 on 6/23, 2 on 6/24) and EGD 6/22, bleeding from gastritis/duodenitis, known GAVE  #R posterior heel wound w/ overlying eschar – no signs of infections, XR neg for gas, evaluated by podiatry and ID. MRI w/o contrast limited, but no overt signs of active infection.  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 per ID  #Awaiting liver transplant, in SICU    Recommendation:  - transfuse PRBCs for Hb < 7.0 g/dL, agree w/ plts and FFP  - f/u stool H pylori  - c/w miralax  - c/w rifaximin  - f/u transplant ID recs re: antibiotics (c/w Bactrim)  - trend CMP, CBC, INR daily  - agree with NaCl infusion  - continue home midodrine therapy    Ze Dominguez  Gastroenterology Fellow  MONDAY-FRIDAY 8AM-5PM:  Available via Microsoft Teams  Call [phone] ([facility]) or Page [phone] ([facility]) from 8am-5pm M-F  AT NIGHT AND ON WEEKENDS:  Please contact on-call GI fellow via answering service ([phone]) 64M w/ HFpEF, decompensated JEAN cirrhosis w/ hx of ascites + SBP, GI bleed 2/2 duodenal ulcers, angioectasias, and GAVE, ESBL E. Coli prostatic abscess s/p 4 weeks IV ertapenem, hx of ESBL UTIs and VRE colonization, admitted on 6/11/20 w/ jaundice and confusion, found to have MELD-increase and bacteriuria with positive leukocyte esterase. Mentation improved with Abx.    Impression:  #Cirrhosis 2/2 JAEN, decompensated by ascites; listed for liver transplant  -varices: grade II on EGD 6/22  -ascites: trace on U/S this admission  - hyponatremia: almost resolved, on NaCl 2% gtt, nephrology following  -HCC: neg on U/S this admission  -HE: a bit worse today, trace asterixis, on rifaximin. 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 MRI w/o contrast limited, but no overt signs of active infection.  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     Recommendation:  - GI bleed: transfuse PRBCs for Hb < 7.0 g/dL, agree w/ plts and FFP; continue PPI bid  - f/u stool H pylori  - c/w rifaximin and lactulose  - f/u transplant ID recs re: antibiotics (c/w Bactrim)  - trend CMP, CBC, INR bid  - continue home midodrine therapy    Ze Dominguez  Gastroenterology Fellow  MONDAY-FRIDAY 8AM-5PM:  Available via Microsoft Teams  Call [phone] ([facility]) or Page [phone] ([facility]) from 8am-5pm M-F  AT NIGHT AND ON WEEKENDS:  Please contact on-call GI fellow via answering service ([phone])

## 2020-06-24 NOTE — PROGRESS NOTE ADULT - SUBJECTIVE AND OBJECTIVE BOX
Follow Up:  Pre-OLT, leukocytosis    Interval History: afebrile overnight. confused thus AM. denies pain or any focal complaints.     REVIEW OF SYSTEMS  [  ] ROS unobtainable because:    [ x ] All other systems negative except as noted below    Constitutional:  [ ] fever [ ] chills  [ ] weight loss  [ ] weakness  Skin:  [ ] rash [ ] phlebitis	  Eyes: [ ] icterus [ ] pain  [ ] discharge	  ENMT: [ ] sore throat  [ ] thrush [ ] ulcers [ ] exudates  Respiratory: [ ] dyspnea [ ] hemoptysis [ ] cough [ ] sputum	  Cardiovascular:  [ ] chest pain [ ] palpitations [ ] edema	  Gastrointestinal:  [ ] nausea [ ] vomiting [ ] diarrhea [ ] constipation [ ] pain	  Genitourinary:  [ ] dysuria [ ] frequency [ ] hematuria [ ] discharge [ ] flank pain  [ ] incontinence  Musculoskeletal:  [ ] myalgias [ ] arthralgias [ ] arthritis  [ ] back pain  Neurological:  [ ] headache [ ] seizures  [ ] confusion/altered mental status    Allergies  codeine (Anaphylaxis)        ANTIMICROBIALS:  nystatin    Suspension 142627 four times a day  rifAXIMin 550 two times a day  trimethoprim  160 mG/sulfamethoxazole 800 mG 1 daily      OTHER MEDS:  MEDICATIONS  (STANDING):  acetaminophen   Tablet .. 650 once  epoetin barry-epbx (RETACRIT) Injectable 14627 <User Schedule>  hepatitis B Vaccine - Adult (ENGERIX-B) 20 once  insulin glargine Injectable (LANTUS) 7 at bedtime  insulin lispro (HumaLOG) corrective regimen sliding scale  three times a day before meals  insulin lispro (HumaLOG) corrective regimen sliding scale  at bedtime  insulin lispro Injectable (HumaLOG) 3 three times a day before meals  lactulose Syrup 30 every 6 hours  midodrine. 20 three times a day  octreotide  Infusion 50 <Continuous>  ondansetron Injectable 4 every 8 hours  pantoprazole  Injectable 40 every 12 hours  tamsulosin 0.4 at bedtime      Vital Signs Last 24 Hrs  T(C): 36.6 (24 Jun 2020 15:00), Max: 36.8 (23 Jun 2020 19:00)  T(F): 97.9 (24 Jun 2020 15:00), Max: 98.2 (23 Jun 2020 19:00)  HR: 96 (24 Jun 2020 16:00) (93 - 101)  BP: 119/58 (24 Jun 2020 16:00) (103/55 - 142/63)  BP(mean): 83 (24 Jun 2020 16:00) (73 - 107)  RR: 18 (24 Jun 2020 16:00) (10 - 30)  SpO2: 98% (24 Jun 2020 16:00) (95% - 100%)    PHYSICAL EXAMINATION:  General: Alert and Awake, oriented 3x but still inappropriate at times, NAD, jaundice  HEENT: PERRL, EOMI, scleral icterus  Neck: Supple  Cardiac: RRR, No M/R/G  Resp: CTAB, No Wh/Rh/Ra  Abdomen: NBS, NT/ND, No HSM, No rigidity or guarding  MSK: R heel with eschar without surrounding drainage or underlying fluctuance.  1+ LE edema. No Calf tenderness  Skin: No rashes or lesions. Skin is warm and dry to the touch.   Neuro: Alert and Awake. oriented 3x CN 2-12 Grossly intact. Moves all four extremities spontaneously.  Psych: Calm, Pleasant, Cooperative                          7.0    11.02 )-----------( 21       ( 24 Jun 2020 10:35 )             20.4       06-24    130<L>  |  102  |  50<H>  ----------------------------<  192<H>  4.8   |  19<L>  |  1.26    Ca    10.3      24 Jun 2020 10:35  Phos  2.6     06-24  Mg     1.9     06-24    TPro  5.1<L>  /  Alb  3.7  /  TBili  12.5<H>  /  DBili  2.2<H>  /  AST  25  /  ALT  11  /  AlkPhos  67  06-24          MICROBIOLOGY:  v  .Urine Clean Catch (Midstream)  06-21-20   <10,000 CFU/ml of other organisms  --  --      .Blood Blood-Peripheral  06-21-20   No growth to date.  --  --      .Urine Catheterized  06-14-20   >100,000 CFU/ml Enterococcus faecium (vancomycin resistant)  --  Enterococcus faecium (vancomycin resistant)      .Urine Clean Catch (Midstream)  06-14-20   >100,000 CFU/ml Enterococcus faecium (vancomycin resistant)  --  Enterococcus faecium (vancomycin resistant)      .Blood Blood-Peripheral  06-12-20   No Growth Final  --  --        CMV IgG Antibody: <0.20 U/mL (12-04-19 @ 08:33)  Toxoplasma IgG Screen: <3.0 IU/mL (12-04-19 @ 08:33)          RADIOLOGY:    <The imaging below has been reviewed and visualized by me independently. Findings as detailed in report below>    EXAM:  US DPLX ABDOMEN                        PROCEDURE DATE:  06/24/2020    Limited study.  IVC, main portal vein, left portal vein, and splenic vein at the hilum are patent. Prominent patent superior mesenteric vein.  Nonvisualization of the right portal vein, hepatic arteries, and splenic confluence.

## 2020-06-24 NOTE — PROGRESS NOTE ADULT - SUBJECTIVE AND OBJECTIVE BOX
Transplant Surgery    Present:   Patient seen with multidisciplinary team including Transplant Surgeon: Dr. Gray, Transplant Nephrologist Dr Salvador, Nephrology fellow,  NP Xavi, OLY Bingham during am rounds and examined with Dr. Gray.   Disciplines not in attendance will be notified of the plan    HPI:  65 y/o gentleman with IDDM, dyslipidemia, obesity, GERD, HFpEF with mild LV diastolic dysfunction, MGUS, hronic anemia with a history of duodenal ulcer as well as GAVE and duodenal AVM s/p APC (last on 10/11/19), 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 UNOS listed for liver transplantation at [facility]. 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     Re-admitted with:   ·	worsening HE  ·	UTI/VRE: tx with linezolid 6/15-22, now on bactrim DS 6/22 -   ·	MISA/Hyponatremia ()   ·	UGI bleed (melena) s/p EGD (6/22) c/w hemorrhagic duodenitis/gastritis; Grade 2 EV; requiring PRBC 1u(6/17), 2u (6/21), 1u (6/23), 1u (6/24)  ·	Known h/o R foot ulcer (MRI neg for OM)    Interval Events:   - stable VSS, afebrile  - AMS: AOx 1, + asterixis, Ammonia level 56, on rifaximin and lactulose   - NA is better 133 (from 126 on admission)   - bld cxs (6/21) with NGTD, urine cxs (6/21) with NG   - Thrombocytopenia (plt 24)  - MELD-NA: 31    Plan of care:  See Below    MEDICATIONS  (STANDING):  chlorhexidine 2% Cloths 1 Application(s) Topical <User Schedule>  epoetin barry-epbx (RETACRIT) Injectable 30582 Unit(s) SubCutaneous <User Schedule>  folic acid 1 milliGRAM(s) Oral daily  hepatitis B Vaccine - Adult (ENGERIX-B) 20 MICROGram(s) IntraMuscular once  insulin glargine Injectable (LANTUS) 7 Unit(s) SubCutaneous at bedtime  insulin lispro (HumaLOG) corrective regimen sliding scale   SubCutaneous three times a day before meals  insulin lispro (HumaLOG) corrective regimen sliding scale   SubCutaneous at bedtime  insulin lispro Injectable (HumaLOG) 3 Unit(s) SubCutaneous three times a day before meals  lactulose Syrup 30 Gram(s) Oral every 6 hours  midodrine. 20 milliGRAM(s) Oral three times a day  nystatin    Suspension 413016 Unit(s) Oral four times a day  nystatin Cream 1 Application(s) Topical two times a day  ondansetron Injectable 4 milliGRAM(s) IV Push every 8 hours  pantoprazole  Injectable 40 milliGRAM(s) IV Push every 12 hours  rifAXIMin 550 milliGRAM(s) Oral two times a day  silver sulfADIAZINE 1% Cream 1 Application(s) Topical two times a day  sodium chloride 1 Gram(s) Oral every 8 hours  sodium chloride 2% . 1000 milliLiter(s) (60 mL/Hr) IV Continuous <Continuous>  tamsulosin 0.4 milliGRAM(s) Oral at bedtime  trimethoprim  160 mG/sulfamethoxazole 800 mG 1 Tablet(s) Oral daily    PAST MEDICAL & SURGICAL HISTORY:  GIB (gastrointestinal bleeding)  GERD with esophagitis: Gastritis &amp; Non Bleeding Ulcers  Hepatic encephalopathy  Obesity  Fatty liver disease, nonalcoholic  Renal stones: 25 years ago  Hypertension  Neuropathy  Hypercholesteremia  Diabetes  S/P cholecystectomy    Vital Signs Last 24 Hrs  T(C): 36.5 (24 Jun 2020 07:00), Max: 36.8 (23 Jun 2020 19:00)  T(F): 97.7 (24 Jun 2020 07:00), Max: 98.2 (23 Jun 2020 19:00)  HR: 96 (24 Jun 2020 10:00) (94 - 101)  BP: 134/61 (24 Jun 2020 10:00) (103/55 - 142/63)  BP(mean): 88 (24 Jun 2020 10:00) (73 - 107)  RR: 10 (24 Jun 2020 10:00) (10 - 31)  SpO2: 96% (24 Jun 2020 10:00) (96% - 100%)    I&O's Summary    23 Jun 2020 07:01  -  24 Jun 2020 07:00  --------------------------------------------------------  IN: 2050 mL / OUT: 1650 mL / NET: 400 mL    24 Jun 2020 07:01  -  24 Jun 2020 10:15  --------------------------------------------------------  IN: 680 mL / OUT: 450 mL / NET: 230 mL                          7.4    12.44 )-----------( 23       ( 24 Jun 2020 07:05 )             21.4     06-24    133<L>  |  103  |  52<H>  ----------------------------<  190<H>  5.1   |  19<L>  |  1.29    Ca    10.6<H>      24 Jun 2020 03:24  Phos  3.1     06-24  Mg     2.0     06-24    TPro  5.5<L>  /  Alb  3.9  /  TBili  12.8<H>  /  DBili  2.3<H>  /  AST  23  /  ALT  12  /  AlkPhos  71  06-24      Culture - Urine (collected 06-21-20 @ 21:55)  Source: .Urine Clean Catch (Midstream)  Final Report (06-22-20 @ 17:03):    <10,000 CFU/ml of other organisms    Culture - Blood (collected 06-21-20 @ 21:53)  Source: .Blood Blood-Peripheral  Preliminary Report (06-22-20 @ 22:01):    No growth to date.    Culture - Blood (collected 06-21-20 @ 21:53)  Source: .Blood Blood-Peripheral  Preliminary Report (06-22-20 @ 22:01):    No growth to date.    Review of systems  Gen: No weight changes, fatigue, fevers/chills, weakness  Skin: Jaundice, +R heel ulcer  Head/Eyes/Ears/Mouth: No headache; Normal hearing; Normal vision w/o blurriness; No sinus pain/discomfort.    Respiratory: No dyspnea, cough, wheezing, hemoptysis  CV: No chest pain, PND, orthopnea  GI: denies diarrhea, constipation, nausea, vomiting, melena, hematochezia  : No increased frequency, dysuria, hematuria, nocturia  MSK: No joint pain/swelling; no back pain; no edema  Neuro: No dizziness/lightheadedness, weakness, seizures, numbness, tingling  Heme: No easy bruising or bleeding  Endo: No heat/cold intolerance  Psych: No significant nervousness, anxiety, stress, depression  All other systems were reviewed and are negative, except as noted.      PHYSICAL EXAM:  Constitutional: Well developed / well nourished  Eyes: Anicteric, PERRLA  ENMT: nc/at  Neck: supple  Respiratory: CTA B/L  Cardiovascular: RRR  Abdominal:  soft, NT, ND  Genitourinary: Voiding spontaneously  Extremities: R heel wound w/ overlying eschar w/ no signs of infection.   Vascular: Palpable dp pulses bilaterally  Neurological: A&O x3  Skin: R heel breakdown, no rashes,  lesions   Musculoskeletal: Moving all extremities  Psychiatric: Responsive

## 2020-06-24 NOTE — PROGRESS NOTE ADULT - ATTENDING COMMENTS
Patient with advanced chronic liver disease  MISA, improving creatinine  Hyponatremia: improving  Plan:  Monitor Na, maintain 130-135  Agree with current management  Will follow  I was present during and reviewed clinical and lab data as well as assessment and plan as documented by the houses taff as noted. Please contact if any additional questions with any change in clinical condition or on availability of any additional information or reports.

## 2020-06-24 NOTE — PROGRESS NOTE ADULT - ATTENDING COMMENTS
Dr. Mace (Attending Physician)  Neuro status improved this am, ammonia level not increased  Anemia secondary to GI bleed from gastritis, duodenitis, GAVE TEG done yesterday with low alpha angle and MA will tx 1 unit RBC, 1 Plts, 1 Cryo based on TEG results  Sodium remains at 130 despite increased hypertonic saline  Blood cx sent for elevated wbc, pct 0.13 not cw infectious etiology and change in mental status overnight, vre uti on bactrim  lantus 7, humalog 3, ssi glucose controlled

## 2020-06-24 NOTE — PROGRESS NOTE ADULT - ASSESSMENT
Pt with MISA likely 2/2 pre renal and/or ATN in setting hypotension, less likely HRS (Concepcion>35, no response tx)    #Acute Kidney Injury  - MISA likely 2/2 pre renal ATN in setting hypotension, infection, decrease PO intake, less likely HRS (Concepcion>35, no response tx)  - Urine Na 112 ~ ATN, UPro/Cr 0.2 gram, UA showed UTI w/o protein/rbc.    - Borderline hypotension during hospitalization (on midodrine), no response to albumin/midodrine treated, less likely HRS given Concepcion>35. Good UOP during hospitalization, lasix/aldactone held.    - Baseline sCr 1.0 in 5/2020, on admission sCr 1.57 (6/11), uptrend to 1.75 on 6/13 and has since downtrended, Scr 1.29mg/dl today  - Prior MISA w/u Dec 2019 showed FLC elevated K/L ratio 2.91 (K>L) and immunofixation showing "one Weak IgA Lambda  Band and One Weak IgG Lambda Band Identified"  - C/w holding diuretics for now. Recommend to hold further albumin for now despite urine Na<35   - Monitor serial bladder sono to r/o urinary retention as bob was removed  - Avoid nephrotoxin meds such as NSAIDS, PPI, ACEI/ARB, and contrast agents.  - Renally dose medications per GFR.    #Hyponatremia  - Na 133  - Would c/w  fluid restrict 1L, encourage PO intake with protein, can add protein shakes if ok from hepatology standpoint, liberalize salt intake  - Hold diuretics for now  - Will c/w 2% HTS at 60cc/hr for now, likely drop to 30-40cc after repeat labs.   - Would repeat urine Na, Urine osmolality      #Anemia  - Started ARANZA 10K units TIW  - pRBC transfusion as needed, EGD from 6/22 with gastritis and duodenitis, having ongoing loss  - Hgb 7.4 this am    Abelino Patterson   Nephrology Fellow  Pager NS: [phone]/ [facility]: [phone]  (After 5 pm or on weekends please page the on-call fellow)

## 2020-06-24 NOTE — PROGRESS NOTE ADULT - ASSESSMENT
63 y/o gentleman with IDDM, dyslipidemia, obesity, GERD, HFpEF with mild LV diastolic dysfunction, MGUS, hronic anemia with a history of duodenal ulcer as well as GAVE and duodenal AVM s/p APC (last on 10/11/19), 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     Re-admitted with worsening HE, UTI/VRE, MISA/Hyponatremia, UGI bleed.     [] VRE UTI  - urine cx from 6/14 grew VRE  - f/u urine cx from 6/21 with NG, bld cxs (6/21) NGTD  - received Linezolid 6/15-22, Now on Bactrim DS  - ID following    [] UGI bleed  - s/p EGD (6/22) c/w hemorrhagic duodenitis/gastritis, Grade 2EV  - Melena overnight; H/H 6.8/19, transfused 1U PRBC, repeat H/H 7.4/21  - On Protonix 40mg IV bid    [] JEAN Cirrhosis  - Listed for OLT with MELD 35  - HE: cont rifaximin/lactulose  - Abd US duplex to assess portal vein (ordered)  - Epogen 3xweek   - Daily CBC, CMP, INR  - Diabetes management per ICU team      Please page [phone] with any questions

## 2020-06-25 LAB
ALBUMIN SERPL ELPH-MCNC: 3.6 G/DL — SIGNIFICANT CHANGE UP (ref 3.3–5)
ALBUMIN SERPL ELPH-MCNC: 3.7 G/DL — SIGNIFICANT CHANGE UP (ref 3.3–5)
ALBUMIN SERPL ELPH-MCNC: 3.7 G/DL — SIGNIFICANT CHANGE UP (ref 3.3–5)
ALBUMIN SERPL ELPH-MCNC: 3.8 G/DL — SIGNIFICANT CHANGE UP (ref 3.3–5)
ALP SERPL-CCNC: 79 U/L — SIGNIFICANT CHANGE UP (ref 40–120)
ALP SERPL-CCNC: 81 U/L — SIGNIFICANT CHANGE UP (ref 40–120)
ALP SERPL-CCNC: 85 U/L — SIGNIFICANT CHANGE UP (ref 40–120)
ALP SERPL-CCNC: 98 U/L — SIGNIFICANT CHANGE UP (ref 40–120)
ALT FLD-CCNC: 13 U/L — SIGNIFICANT CHANGE UP (ref 10–45)
ALT FLD-CCNC: 15 U/L — SIGNIFICANT CHANGE UP (ref 10–45)
ALT FLD-CCNC: 15 U/L — SIGNIFICANT CHANGE UP (ref 10–45)
ALT FLD-CCNC: 16 U/L — SIGNIFICANT CHANGE UP (ref 10–45)
ANION GAP SERPL CALC-SCNC: 10 MMOL/L — SIGNIFICANT CHANGE UP (ref 5–17)
ANION GAP SERPL CALC-SCNC: 12 MMOL/L — SIGNIFICANT CHANGE UP (ref 5–17)
ANION GAP SERPL CALC-SCNC: 12 MMOL/L — SIGNIFICANT CHANGE UP (ref 5–17)
ANION GAP SERPL CALC-SCNC: 6 MMOL/L — SIGNIFICANT CHANGE UP (ref 5–17)
APTT BLD: 55.4 SEC — HIGH (ref 27.5–36.3)
APTT BLD: 57 SEC — HIGH (ref 27.5–36.3)
AST SERPL-CCNC: 21 U/L — SIGNIFICANT CHANGE UP (ref 10–40)
AST SERPL-CCNC: 23 U/L — SIGNIFICANT CHANGE UP (ref 10–40)
AST SERPL-CCNC: 24 U/L — SIGNIFICANT CHANGE UP (ref 10–40)
AST SERPL-CCNC: 26 U/L — SIGNIFICANT CHANGE UP (ref 10–40)
BILIRUB DIRECT SERPL-MCNC: 2.4 MG/DL — HIGH (ref 0–0.2)
BILIRUB DIRECT SERPL-MCNC: 2.7 MG/DL — HIGH (ref 0–0.2)
BILIRUB DIRECT SERPL-MCNC: 3.1 MG/DL — HIGH (ref 0–0.2)
BILIRUB DIRECT SERPL-MCNC: 3.1 MG/DL — HIGH (ref 0–0.2)
BILIRUB INDIRECT FLD-MCNC: 12.4 MG/DL — HIGH (ref 0.2–1)
BILIRUB INDIRECT FLD-MCNC: 14 MG/DL — HIGH (ref 0.2–1)
BILIRUB INDIRECT FLD-MCNC: 14.5 MG/DL — HIGH (ref 0.2–1)
BILIRUB INDIRECT FLD-MCNC: 14.5 MG/DL — HIGH (ref 0.2–1)
BILIRUB SERPL-MCNC: 14.8 MG/DL — HIGH (ref 0.2–1.2)
BILIRUB SERPL-MCNC: 16.7 MG/DL — HIGH (ref 0.2–1.2)
BILIRUB SERPL-MCNC: 17.6 MG/DL — HIGH (ref 0.2–1.2)
BILIRUB SERPL-MCNC: 17.6 MG/DL — HIGH (ref 0.2–1.2)
BUN SERPL-MCNC: 46 MG/DL — HIGH (ref 7–23)
BUN SERPL-MCNC: 50 MG/DL — HIGH (ref 7–23)
BUN SERPL-MCNC: 50 MG/DL — HIGH (ref 7–23)
BUN SERPL-MCNC: 51 MG/DL — HIGH (ref 7–23)
CALCIUM SERPL-MCNC: 10.7 MG/DL — HIGH (ref 8.4–10.5)
CALCIUM SERPL-MCNC: 10.9 MG/DL — HIGH (ref 8.4–10.5)
CHLORIDE SERPL-SCNC: 103 MMOL/L — SIGNIFICANT CHANGE UP (ref 96–108)
CHLORIDE SERPL-SCNC: 105 MMOL/L — SIGNIFICANT CHANGE UP (ref 96–108)
CHLORIDE SERPL-SCNC: 106 MMOL/L — SIGNIFICANT CHANGE UP (ref 96–108)
CHLORIDE SERPL-SCNC: 107 MMOL/L — SIGNIFICANT CHANGE UP (ref 96–108)
CO2 SERPL-SCNC: 16 MMOL/L — LOW (ref 22–31)
CO2 SERPL-SCNC: 17 MMOL/L — LOW (ref 22–31)
CO2 SERPL-SCNC: 18 MMOL/L — LOW (ref 22–31)
CO2 SERPL-SCNC: 22 MMOL/L — SIGNIFICANT CHANGE UP (ref 22–31)
CREAT SERPL-MCNC: 1.14 MG/DL — SIGNIFICANT CHANGE UP (ref 0.5–1.3)
CREAT SERPL-MCNC: 1.16 MG/DL — SIGNIFICANT CHANGE UP (ref 0.5–1.3)
CREAT SERPL-MCNC: 1.17 MG/DL — SIGNIFICANT CHANGE UP (ref 0.5–1.3)
CREAT SERPL-MCNC: 1.17 MG/DL — SIGNIFICANT CHANGE UP (ref 0.5–1.3)
FIBRINOGEN PPP-MCNC: 138 MG/DL — LOW (ref 350–510)
FIBRINOGEN PPP-MCNC: 151 MG/DL — LOW (ref 350–510)
GLUCOSE BLDC GLUCOMTR-MCNC: 170 MG/DL — HIGH (ref 70–99)
GLUCOSE BLDC GLUCOMTR-MCNC: 172 MG/DL — HIGH (ref 70–99)
GLUCOSE BLDC GLUCOMTR-MCNC: 254 MG/DL — HIGH (ref 70–99)
GLUCOSE SERPL-MCNC: 114 MG/DL — HIGH (ref 70–99)
GLUCOSE SERPL-MCNC: 164 MG/DL — HIGH (ref 70–99)
GLUCOSE SERPL-MCNC: 181 MG/DL — HIGH (ref 70–99)
GLUCOSE SERPL-MCNC: 98 MG/DL — SIGNIFICANT CHANGE UP (ref 70–99)
HCT VFR BLD CALC: 22.2 % — LOW (ref 39–50)
HCT VFR BLD CALC: 22.8 % — LOW (ref 39–50)
HCT VFR BLD CALC: 23.3 % — LOW (ref 39–50)
HCT VFR BLD CALC: 23.5 % — LOW (ref 39–50)
HGB BLD-MCNC: 7.4 G/DL — LOW (ref 13–17)
HGB BLD-MCNC: 7.7 G/DL — LOW (ref 13–17)
HGB BLD-MCNC: 7.7 G/DL — LOW (ref 13–17)
HGB BLD-MCNC: 8 G/DL — LOW (ref 13–17)
INR BLD: 2.36 RATIO — HIGH (ref 0.88–1.16)
INR BLD: 2.44 RATIO — HIGH (ref 0.88–1.16)
MAGNESIUM SERPL-MCNC: 2 MG/DL — SIGNIFICANT CHANGE UP (ref 1.6–2.6)
MAGNESIUM SERPL-MCNC: 2.1 MG/DL — SIGNIFICANT CHANGE UP (ref 1.6–2.6)
MAGNESIUM SERPL-MCNC: 2.3 MG/DL — SIGNIFICANT CHANGE UP (ref 1.6–2.6)
MAGNESIUM SERPL-MCNC: 2.4 MG/DL — SIGNIFICANT CHANGE UP (ref 1.6–2.6)
MCHC RBC-ENTMCNC: 33 GM/DL — SIGNIFICANT CHANGE UP (ref 32–36)
MCHC RBC-ENTMCNC: 33.3 GM/DL — SIGNIFICANT CHANGE UP (ref 32–36)
MCHC RBC-ENTMCNC: 33.8 GM/DL — SIGNIFICANT CHANGE UP (ref 32–36)
MCHC RBC-ENTMCNC: 33.8 PG — SIGNIFICANT CHANGE UP (ref 27–34)
MCHC RBC-ENTMCNC: 33.9 PG — SIGNIFICANT CHANGE UP (ref 27–34)
MCHC RBC-ENTMCNC: 34 GM/DL — SIGNIFICANT CHANGE UP (ref 32–36)
MCV RBC AUTO: 100.4 FL — HIGH (ref 80–100)
MCV RBC AUTO: 101.4 FL — HIGH (ref 80–100)
MCV RBC AUTO: 102.6 FL — HIGH (ref 80–100)
MCV RBC AUTO: 99.6 FL — SIGNIFICANT CHANGE UP (ref 80–100)
NRBC # BLD: 0 /100 WBCS — SIGNIFICANT CHANGE UP (ref 0–0)
PHOSPHATE SERPL-MCNC: 2.3 MG/DL — LOW (ref 2.5–4.5)
PHOSPHATE SERPL-MCNC: 2.5 MG/DL — SIGNIFICANT CHANGE UP (ref 2.5–4.5)
PHOSPHATE SERPL-MCNC: 2.6 MG/DL — SIGNIFICANT CHANGE UP (ref 2.5–4.5)
PHOSPHATE SERPL-MCNC: 3.3 MG/DL — SIGNIFICANT CHANGE UP (ref 2.5–4.5)
PLATELET # BLD AUTO: 34 K/UL — LOW (ref 150–400)
PLATELET # BLD AUTO: 37 K/UL — LOW (ref 150–400)
PLATELET # BLD AUTO: 39 K/UL — LOW (ref 150–400)
PLATELET # BLD AUTO: 42 K/UL — LOW (ref 150–400)
POTASSIUM SERPL-MCNC: 5 MMOL/L — SIGNIFICANT CHANGE UP (ref 3.5–5.3)
POTASSIUM SERPL-MCNC: 5.2 MMOL/L — SIGNIFICANT CHANGE UP (ref 3.5–5.3)
POTASSIUM SERPL-MCNC: 5.3 MMOL/L — SIGNIFICANT CHANGE UP (ref 3.5–5.3)
POTASSIUM SERPL-MCNC: 5.5 MMOL/L — HIGH (ref 3.5–5.3)
POTASSIUM SERPL-SCNC: 5 MMOL/L — SIGNIFICANT CHANGE UP (ref 3.5–5.3)
POTASSIUM SERPL-SCNC: 5.2 MMOL/L — SIGNIFICANT CHANGE UP (ref 3.5–5.3)
POTASSIUM SERPL-SCNC: 5.3 MMOL/L — SIGNIFICANT CHANGE UP (ref 3.5–5.3)
POTASSIUM SERPL-SCNC: 5.5 MMOL/L — HIGH (ref 3.5–5.3)
PROT SERPL-MCNC: 5.5 G/DL — LOW (ref 6–8.3)
PROTHROM AB SERPL-ACNC: 27.9 SEC — HIGH (ref 10–12.9)
PROTHROM AB SERPL-ACNC: 28.6 SEC — HIGH (ref 10–12.9)
RBC # BLD: 2.19 M/UL — LOW (ref 4.2–5.8)
RBC # BLD: 2.27 M/UL — LOW (ref 4.2–5.8)
RBC # BLD: 2.27 M/UL — LOW (ref 4.2–5.8)
RBC # BLD: 2.36 M/UL — LOW (ref 4.2–5.8)
RBC # FLD: 21.3 % — HIGH (ref 10.3–14.5)
RBC # FLD: 21.7 % — HIGH (ref 10.3–14.5)
RBC # FLD: 21.9 % — HIGH (ref 10.3–14.5)
RBC # FLD: 22 % — HIGH (ref 10.3–14.5)
SODIUM SERPL-SCNC: 131 MMOL/L — LOW (ref 135–145)
SODIUM SERPL-SCNC: 134 MMOL/L — LOW (ref 135–145)
SODIUM SERPL-SCNC: 134 MMOL/L — LOW (ref 135–145)
SODIUM SERPL-SCNC: 135 MMOL/L — SIGNIFICANT CHANGE UP (ref 135–145)
WBC # BLD: 10.43 K/UL — SIGNIFICANT CHANGE UP (ref 3.8–10.5)
WBC # BLD: 11.24 K/UL — HIGH (ref 3.8–10.5)
WBC # BLD: 8.31 K/UL — SIGNIFICANT CHANGE UP (ref 3.8–10.5)
WBC # BLD: 9.87 K/UL — SIGNIFICANT CHANGE UP (ref 3.8–10.5)
WBC # FLD AUTO: 10.43 K/UL — SIGNIFICANT CHANGE UP (ref 3.8–10.5)
WBC # FLD AUTO: 11.24 K/UL — HIGH (ref 3.8–10.5)
WBC # FLD AUTO: 8.31 K/UL — SIGNIFICANT CHANGE UP (ref 3.8–10.5)
WBC # FLD AUTO: 9.87 K/UL — SIGNIFICANT CHANGE UP (ref 3.8–10.5)

## 2020-06-25 PROCEDURE — 99232 SBSQ HOSP IP/OBS MODERATE 35: CPT | Mod: GC

## 2020-06-25 PROCEDURE — 99233 SBSQ HOSP IP/OBS HIGH 50: CPT | Mod: GC

## 2020-06-25 PROCEDURE — 99232 SBSQ HOSP IP/OBS MODERATE 35: CPT

## 2020-06-25 RX ORDER — DESMOPRESSIN ACETATE 0.1 MG/1
20 TABLET ORAL ONCE
Refills: 0 | Status: COMPLETED | OUTPATIENT
Start: 2020-06-25 | End: 2020-06-25

## 2020-06-25 RX ADMIN — Medication 1 APPLICATION(S): at 06:19

## 2020-06-25 RX ADMIN — OCTREOTIDE ACETATE 10 MICROGRAM(S)/HR: 200 INJECTION, SOLUTION INTRAVENOUS; SUBCUTANEOUS at 21:19

## 2020-06-25 RX ADMIN — TAMSULOSIN HYDROCHLORIDE 0.4 MILLIGRAM(S): 0.4 CAPSULE ORAL at 21:21

## 2020-06-25 RX ADMIN — Medication 1 TABLET(S): at 12:16

## 2020-06-25 RX ADMIN — SODIUM CHLORIDE 1 GRAM(S): 9 INJECTION INTRAMUSCULAR; INTRAVENOUS; SUBCUTANEOUS at 06:19

## 2020-06-25 RX ADMIN — Medication 3 UNIT(S): at 12:15

## 2020-06-25 RX ADMIN — OCTREOTIDE ACETATE 10 MICROGRAM(S)/HR: 200 INJECTION, SOLUTION INTRAVENOUS; SUBCUTANEOUS at 08:07

## 2020-06-25 RX ADMIN — Medication 3 UNIT(S): at 16:22

## 2020-06-25 RX ADMIN — SODIUM CHLORIDE 1 GRAM(S): 9 INJECTION INTRAMUSCULAR; INTRAVENOUS; SUBCUTANEOUS at 21:21

## 2020-06-25 RX ADMIN — Medication 3: at 16:22

## 2020-06-25 RX ADMIN — Medication 500000 UNIT(S): at 23:11

## 2020-06-25 RX ADMIN — LACTULOSE 30 GRAM(S): 10 SOLUTION ORAL at 05:34

## 2020-06-25 RX ADMIN — LACTULOSE 30 GRAM(S): 10 SOLUTION ORAL at 23:12

## 2020-06-25 RX ADMIN — NYSTATIN CREAM 1 APPLICATION(S): 100000 CREAM TOPICAL at 18:06

## 2020-06-25 RX ADMIN — LACTULOSE 30 GRAM(S): 10 SOLUTION ORAL at 12:16

## 2020-06-25 RX ADMIN — Medication 500000 UNIT(S): at 18:07

## 2020-06-25 RX ADMIN — PANTOPRAZOLE SODIUM 40 MILLIGRAM(S): 20 TABLET, DELAYED RELEASE ORAL at 06:20

## 2020-06-25 RX ADMIN — Medication 1: at 12:15

## 2020-06-25 RX ADMIN — INSULIN GLARGINE 7 UNIT(S): 100 INJECTION, SOLUTION SUBCUTANEOUS at 21:19

## 2020-06-25 RX ADMIN — INSULIN GLARGINE 7 UNIT(S): 100 INJECTION, SOLUTION SUBCUTANEOUS at 01:27

## 2020-06-25 RX ADMIN — ONDANSETRON 4 MILLIGRAM(S): 8 TABLET, FILM COATED ORAL at 05:35

## 2020-06-25 RX ADMIN — Medication 500000 UNIT(S): at 05:34

## 2020-06-25 RX ADMIN — MIDODRINE HYDROCHLORIDE 20 MILLIGRAM(S): 2.5 TABLET ORAL at 18:10

## 2020-06-25 RX ADMIN — CHLORHEXIDINE GLUCONATE 1 APPLICATION(S): 213 SOLUTION TOPICAL at 05:33

## 2020-06-25 RX ADMIN — Medication 1 MILLIGRAM(S): at 12:18

## 2020-06-25 RX ADMIN — DESMOPRESSIN ACETATE 220 MICROGRAM(S): 0.1 TABLET ORAL at 19:34

## 2020-06-25 RX ADMIN — SODIUM CHLORIDE 30 MILLILITER(S): 5 INJECTION, SOLUTION INTRAVENOUS at 08:07

## 2020-06-25 RX ADMIN — SODIUM CHLORIDE 1 GRAM(S): 9 INJECTION INTRAMUSCULAR; INTRAVENOUS; SUBCUTANEOUS at 13:58

## 2020-06-25 RX ADMIN — LACTULOSE 30 GRAM(S): 10 SOLUTION ORAL at 18:10

## 2020-06-25 RX ADMIN — MIDODRINE HYDROCHLORIDE 20 MILLIGRAM(S): 2.5 TABLET ORAL at 12:16

## 2020-06-25 RX ADMIN — Medication 1 APPLICATION(S): at 18:08

## 2020-06-25 RX ADMIN — MIDODRINE HYDROCHLORIDE 20 MILLIGRAM(S): 2.5 TABLET ORAL at 05:34

## 2020-06-25 RX ADMIN — NYSTATIN CREAM 1 APPLICATION(S): 100000 CREAM TOPICAL at 05:35

## 2020-06-25 RX ADMIN — Medication 500000 UNIT(S): at 12:15

## 2020-06-25 RX ADMIN — PANTOPRAZOLE SODIUM 40 MILLIGRAM(S): 20 TABLET, DELAYED RELEASE ORAL at 18:07

## 2020-06-25 RX ADMIN — Medication 3 UNIT(S): at 08:30

## 2020-06-25 RX ADMIN — Medication 62.5 MILLIMOLE(S): at 08:07

## 2020-06-25 NOTE — PROGRESS NOTE ADULT - ATTENDING COMMENTS
Patient with advanced liver disease, portal hypertension, hyponatremia, MISA improved creatinine; Anemia  Plan:  Will hold hypertonic saline infusion, monitor Na  Will follow  I was present during and reviewed clinical and lab data as well as assessment and plan as documented by the house staff as noted. Please contact if any additional questions with any change in clinical condition or on availability of any additional information or reports.

## 2020-06-25 NOTE — PROGRESS NOTE ADULT - SUBJECTIVE AND OBJECTIVE BOX
[facility] DIVISION OF KIDNEY DISEASES AND HYPERTENSION -- FOLLOW UP NOTE  --------------------------------------------------------------------------------  Abelino Patterson   Nephrology Fellow  Pager NS: [phone]/ [facility]: [phone]  (After 5 pm or on weekends please page the on-call fellow)    24 hour events/subjective: Transfused 1 unit pRBC with inadequate response as well as 1 one platelets and 1 unit cryoprecipitate.  2% saline decreased to 30ml/hr. Hgb this am 7.7. BUN/Scr: 50/1.17mg/dl, K 5.3, Bicarb 16. Abdominal doppler w/o evidence of portal vein thrombosis.         PAST HISTORY  --------------------------------------------------------------------------------  No significant changes to PMH, PSH, FHx, SHx, unless otherwise noted    ALLERGIES & MEDICATIONS  --------------------------------------------------------------------------------  Allergies    codeine (Anaphylaxis)    Intolerances      Standing Inpatient Medications  chlorhexidine 2% Cloths 1 Application(s) Topical <User Schedule>  epoetin barry-epbx (RETACRIT) Injectable 32349 Unit(s) SubCutaneous <User Schedule>  folic acid 1 milliGRAM(s) Oral daily  hepatitis B Vaccine - Adult (ENGERIX-B) 20 MICROGram(s) IntraMuscular once  insulin glargine Injectable (LANTUS) 7 Unit(s) SubCutaneous at bedtime  insulin lispro (HumaLOG) corrective regimen sliding scale   SubCutaneous three times a day before meals  insulin lispro (HumaLOG) corrective regimen sliding scale   SubCutaneous at bedtime  insulin lispro Injectable (HumaLOG) 3 Unit(s) SubCutaneous three times a day before meals  lactulose Syrup 30 Gram(s) Oral every 6 hours  midodrine. 20 milliGRAM(s) Oral three times a day  nystatin    Suspension 156169 Unit(s) Oral four times a day  nystatin Cream 1 Application(s) Topical two times a day  octreotide  Infusion 50 MICROgram(s)/Hr IV Continuous <Continuous>  ondansetron Injectable 4 milliGRAM(s) IV Push every 8 hours  pantoprazole  Injectable 40 milliGRAM(s) IV Push every 12 hours  rifAXIMin 550 milliGRAM(s) Oral two times a day  silver sulfADIAZINE 1% Cream 1 Application(s) Topical two times a day  sodium chloride 1 Gram(s) Oral every 8 hours  tamsulosin 0.4 milliGRAM(s) Oral at bedtime  trimethoprim  160 mG/sulfamethoxazole 800 mG 1 Tablet(s) Oral daily    PRN Inpatient Medications      REVIEW OF SYSTEMS  --------------------------------------------------------------------------------  Gen: No fevers/chills  Head/Eyes/Ears/Mouth: No headache; Normal hearing; Normal vision    Respiratory: No dyspnea, cough  CV: No chest pain  GI: No abdominal pain, diarrhea, constipation, nausea, vomiting  : No increased frequency, dysuria  MSK: No joint pain/swelling; no edema    All other systems were reviewed and are negative, except as noted.    VITALS/PHYSICAL EXAM  --------------------------------------------------------------------------------  T(C): 36.2 (06-25-20 @ 07:00), Max: 37.4 (06-24-20 @ 19:00)  HR: 93 (06-25-20 @ 10:00) (90 - 98)  BP: 121/57 (06-25-20 @ 10:00) (115/57 - 143/65)  RR: 17 (06-25-20 @ 10:00) (9 - 20)  SpO2: 97% (06-25-20 @ 10:00) (93% - 98%)  Wt(kg): --        06-24-20 @ 07:01  -  06-25-20 @ 07:00  --------------------------------------------------------  IN: 3385 mL / OUT: 2950 mL / NET: 435 mL    06-25-20 @ 07:01  -  06-25-20 @ 11:06  --------------------------------------------------------  IN: 397.5 mL / OUT: 0 mL / NET: 397.5 mL      Physical Exam:    	Gen: NAD, well-appearing on room air  	HEENT: Scleral icterus  	Pulm: CTA B/L, no crackles  	CV: RRR, S1S2; no rub/murmur  	GI: +BS, soft, nontender  	: No suprapubic tenderness  	MSK: Warm, no edema              Neuro: AOx3, + mild asterixis   	Psych: Normal affect and mood  	Skin: Warm, no cyanosis    LABS/STUDIES  --------------------------------------------------------------------------------              7.7    10.43 >-----------<  39       [06-25-20 @ 06:41]              22.8     135  |  107  |  50  ----------------------------<  98      [06-25-20 @ 06:41]  5.3   |  16  |  1.17        Ca     10.9     [06-25-20 @ 06:41]      Mg     2.3     [06-25-20 @ 06:41]      Phos  2.5     [06-25-20 @ 06:41]    TPro  5.5  /  Alb  3.6  /  TBili  16.7  /  DBili  2.7  /  AST  23  /  ALT  15  /  AlkPhos  79  [06-25-20 @ 06:41]    PT/INR: PT 27.9 , INR 2.36       [06-25-20 @ 01:08]  PTT: 57.0       [06-25-20 @ 01:08]      Creatinine Trend:  SCr 1.17 [06-25 @ 06:41]  SCr 1.17 [06-25 @ 01:08]  SCr 1.25 [06-24 @ 16:37]  SCr 1.26 [06-24 @ 10:35]  SCr 1.29 [06-24 @ 03:24]              Urinalysis - [06-24-20 @ 16:37]      Color Yellow / Appearance Clear / SG 1.017 / pH 5.5      Gluc Negative / Ketone Negative  / Bili Negative / Urobili Negative       Blood Large / Protein Trace / Leuk Est Moderate / Nitrite Negative       / WBC 9 / Hyaline 0 / Gran  / Sq Epi  / Non Sq Epi 1 / Bacteria Negative    Urine Creatinine 106      [06-21-20 @ 11:33]  Urine Sodium <35      [06-23-20 @ 08:32]  Urine Urea Nitrogen 865      [06-21-20 @ 13:13]  Urine Osmolality 483      [06-23-20 @ 08:32]    Iron 160, TIBC Unable to calculate Test Repeated, %sat Unable to calculate Test Repeated      [06-12-20 @ 09:04]  Ferritin 493      [04-29-20 @ 08:20]  PTH -- (Ca 10.3)      [12-13-19 @ 08:11]   12  Vitamin D (25OH) 25.3      [12-13-19 @ 08:11]  HbA1c 6.4      [02-12-20 @ 17:08]  TSH 3.26      [04-26-20 @ 09:47]  Lipid: chol 71, TG 63, HDL 30, LDL 28      [11-27-19 @ 09:11]    HBsAb Nonreact      [06-13-20 @ 10:35]  HBsAg Nonreact      [06-13-20 @ 10:35]  HBcAb Nonreact      [06-13-20 @ 10:35]

## 2020-06-25 NOTE — PROGRESS NOTE ADULT - ATTENDING COMMENTS
MELD 35 expires tonight.  Receiving some offers, but not high on list.     On Bactrim.     Remains encephalopathic

## 2020-06-25 NOTE — PROGRESS NOTE ADULT - SUBJECTIVE AND OBJECTIVE BOX
INTERVAL HPI/OVERNIGHT EVENTS:    events noted  s/p prbc        Allergies    codeine (Anaphylaxis)    Intolerances    General:  No wt loss, fevers, chills, night sweats, fatigue,   Eyes:  Good vision, no reported pain  ENT:  No sore throat, pain, runny nose, dysphagia  CV:  No pain, palpitations, hypo/hypertension  Resp:  No dyspnea, cough, tachypnea, wheezing  GI:  No pain, No nausea, No vomiting, No diarrhea, No constipation, No weight loss, No fever, No pruritis, No rectal bleeding, No tarry stools, No dysphagia,  :  No pain, bleeding, incontinence, nocturia  Muscle:  No pain, weakness  Neuro:  No weakness, tingling, memory problems  Psych:  No fatigue, insomnia, mood problems, depression  Endocrine:  No polyuria, polydipsia, cold/heat intolerance  Heme:  No petechiae, ecchymosis, easy bruisability  Skin:  No rash, tattoos, scars, edema    PHYSICAL EXAM:   Vital Signs:  Vital Signs Last 24 Hrs  T(C): 36.7 (14 Jun 2020 07:53), Max: 36.7 (14 Jun 2020 07:53)  T(F): 98.1 (14 Jun 2020 07:53), Max: 98.1 (14 Jun 2020 07:53)  HR: 79 (14 Jun 2020 07:53) (75 - 80)  BP: 127/77 (14 Jun 2020 07:53) (108/53 - 133/68)  BP(mean): --  RR: 18 (14 Jun 2020 07:53) (17 - 18)  SpO2: 97% (14 Jun 2020 07:53) (97% - 100%)  Daily     Daily I&O's Summary    13 Jun 2020 07:01  -  14 Jun 2020 07:00  --------------------------------------------------------  IN: 350 mL / OUT: 250 mL / NET: 100 mL    14 Jun 2020 07:01  -  14 Jun 2020 12:30  --------------------------------------------------------  IN: 0 mL / OUT: 350 mL / NET: -350 mL        GENERAL:  no distress  HEENT:  NC/AT   ABDOMEN:  Soft, non-tender, non-distended, normoactive bowel sounds  EXTEREMITIES:  + edema  SKIN:  No rash/erythema/ecchymoses/petechiae/wounds/abscess/warm/dry  NEURO:  awake, alert, oriented       LABS:                        9.0    5.07  )-----------( 68       ( 14 Jun 2020 07:17 )             26.4     06-14    131<L>  |  100  |  27<H>  ----------------------------<  141<H>  5.1   |  20<L>  |  1.74<H>    Ca    10.5      14 Jun 2020 07:17    TPro  6.3  /  Alb  3.8  /  TBili  11.0<H>  /  DBili  x   /  AST  24  /  ALT  15  /  AlkPhos  133<H>  06-14    PT/INR - ( 14 Jun 2020 09:19 )   PT: 26.3 sec;   INR: 2.23 ratio         PTT - ( 14 Jun 2020 09:19 )  PTT:44.2 sec    amylase   lipase  RADIOLOGY & ADDITIONAL TESTS:

## 2020-06-25 NOTE — PROGRESS NOTE ADULT - ASSESSMENT
64M w/ HFpEF, decompensated JEAN cirrhosis w/ hx of ascites + SBP, GI bleed 2/2 duodenal ulcers, angioectasias, and GAVE, ESBL E. Coli prostatic abscess s/p 4 weeks IV ertapenem, hx of ESBL UTIs and VRE colonization, admitted on 6/11/20 w/ jaundice and confusion, found to have MELD-increase and bacteriuria with positive leukocyte esterase. Mentation improved with Abx.    Impression:  #Cirrhosis 2/2 JEAN, decompensated by ascites; listed for liver transplant  -varices: grade II on EGD 6/22  -ascites: trace on U/S this admission  - hyponatremia: almost resolved, on NaCl 2% gtt, nephrology following  -HCC: neg on U/S this admission  -HE: a bit worse today, trace asterixis, on rifaximin. 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 MRI w/o contrast limited, but no overt signs of active infection.  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     Recommendation:  - GI bleed: transfuse PRBCs for Hb < 7.0 g/dL, agree w/ DDAVP and octreotide, continue PPI bid  - will consider EGD w/ RFA of GAVE, make NPO @ [facility] tonight  - f/u stool H pylori  - c/w rifaximin and lactulose  - f/u transplant ID recs re: antibiotics (c/w Bactrim)  - trend CMP, CBC, INR bid  - continue home midodrine therapy  - f/u transplant surgery recs  - rest of care per SICU team  Ze Dominguez  Gastroenterology Fellow  MONDAY-FRIDAY 8AM-5PM:  Available via Microsoft Teams  Call [phone] ([facility]) or Page [phone] ([facility]) from 8am-5pm M-F  AT NIGHT AND ON WEEKENDS:  Please contact on-call GI fellow via answering service ([phone]) 64M w/ HFpEF, decompensated JEAN cirrhosis w/ hx of ascites + SBP, GI bleed 2/2 duodenal ulcers, angioectasias, and GAVE, ESBL E. Coli prostatic abscess s/p 4 weeks IV ertapenem, hx of ESBL UTIs and VRE colonization, admitted on 6/11/20 w/ jaundice and confusion, found to have MELD-increase and bacteriuria with positive leukocyte esterase. Mentation improved with Abx.    Impression:  #Cirrhosis 2/2 JEAN, decompensated by ascites; listed for liver transplant  -varices: grade II on EGD 6/22  -ascites: trace on U/S this admission  - hyponatremia: almost resolved, on NaCl 2% gtt, nephrology following  -HCC: neg on U/S this admission  -HE: a bit worse today, trace asterixis, on rifaximin. 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 MRI w/o contrast limited, but no overt signs of active infection.  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     Recommendation:  - GI bleed: transfuse PRBCs for Hb < 7.0 g/dL, transfuse PLT and DDAVP  - continue octreotide, continue PPI bid  - will likely do EGD w/ RFA of GAVE  - f/u stool H pylori  - c/w rifaximin and lactulose  - f/u transplant ID recs re: antibiotics (c/w Bactrim)  - trend CMP, CBC, INR bid  - continue home midodrine therapy  - f/u transplant surgery recs  - rest of care per SICU team  Ze Dominguez  Gastroenterology Fellow  MONDAY-FRIDAY 8AM-5PM:  Available via Microsoft Teams  Call [phone] ([facility]) or Page [phone] ([facility]) from 8am-5pm M-F  AT NIGHT AND ON WEEKENDS:  Please contact on-call GI fellow via answering service ([phone])

## 2020-06-25 NOTE — PROGRESS NOTE ADULT - SUBJECTIVE AND OBJECTIVE BOX
Chief Complaint:  Patient is a 64y old  Male who presents with a chief complaint of Liver failure, hepatic encephalopathy (2020 13:51)    Reason for consult: cirrhosis, HE    Interval Events: Continues to require transfusions for GIB (6 total). Otherwise no changes.    Hospital Medications:  chlorhexidine 2% Cloths 1 Application(s) Topical <User Schedule>  epoetin barry-epbx (RETACRIT) Injectable 71316 Unit(s) SubCutaneous <User Schedule>  folic acid 1 milliGRAM(s) Oral daily  hepatitis B Vaccine - Adult (ENGERIX-B) 20 MICROGram(s) IntraMuscular once  insulin glargine Injectable (LANTUS) 7 Unit(s) SubCutaneous at bedtime  insulin lispro (HumaLOG) corrective regimen sliding scale   SubCutaneous three times a day before meals  insulin lispro (HumaLOG) corrective regimen sliding scale   SubCutaneous at bedtime  insulin lispro Injectable (HumaLOG) 3 Unit(s) SubCutaneous three times a day before meals  lactulose Syrup 30 Gram(s) Oral every 6 hours  midodrine. 20 milliGRAM(s) Oral three times a day  nystatin    Suspension 929455 Unit(s) Oral four times a day  nystatin Cream 1 Application(s) Topical two times a day  octreotide  Infusion 50 MICROgram(s)/Hr IV Continuous <Continuous>  ondansetron Injectable 4 milliGRAM(s) IV Push every 8 hours  pantoprazole  Injectable 40 milliGRAM(s) IV Push every 12 hours  rifAXIMin 550 milliGRAM(s) Oral two times a day  silver sulfADIAZINE 1% Cream 1 Application(s) Topical two times a day  sodium chloride 1 Gram(s) Oral every 8 hours  tamsulosin 0.4 milliGRAM(s) Oral at bedtime  trimethoprim  160 mG/sulfamethoxazole 800 mG 1 Tablet(s) Oral daily      ROS:   General:  No  fevers, chills, night sweats, fatigue  Eyes:  Good vision, no reported pain  ENT:  No sore throat, pain, runny nose  CV:  No pain, palpitations  Pulm:  No dyspnea, cough  GI:  See HPI, otherwise negative  :  No  incontinence, nocturia  Muscle:  No pain, weakness  Neuro:  No memory problems  Psych:  No insomnia, mood problems, depression  Endocrine:  No polyuria, polydipsia, cold/heat intolerance  Heme:  No petechiae, ecchymosis, easy bruisability  Skin:  No rash    PHYSICAL EXAM:   Vital Signs:  Vital Signs Last 24 Hrs  T(C): 36.1 (2020 15:00), Max: 37.4 (2020 19:00)  T(F): 97 (2020 15:00), Max: 99.3 (2020 19:00)  HR: 88 (2020 16:00) (87 - 98)  BP: 144/67 (2020 16:00) (115/57 - 144/67)  BP(mean): 96 (2020 16:00) (82 - 96)  RR: 17 (2020 16:00) (9 - 22)  SpO2: 98% (2020 16:00) (92% - 98%)  Daily     Daily Weight in k.8 (2020 00:51)    GENERAL: no acute distress  NEURO: alert, no asterixis  HEENT: anicteric sclera, no conjunctival pallor appreciated  CHEST: no respiratory distress, no accessory muscle use  CARDIAC: regular rate, rhythm  ABDOMEN: soft, non-tender, non-distended, no rebound or guarding  EXTREMITIES: warm, well perfused, no edema  SKIN: no lesions noted    LABS: reviewed                        7.7    9.87  )-----------( 37       ( 2020 12:38 )             23.3     06-25    134<L>  |  105  |  50<H>  ----------------------------<  181<H>  5.5<H>   |  17<L>  |  1.14    Ca    10.7<H>      2020 12:38  Phos  3.3     06-25  Mg     2.1     06-25    TPro  5.5<L>  /  Alb  3.7  /  TBili  17.6<H>  /  DBili  3.1<H>  /  AST  21  /  ALT  16  /  AlkPhos  85  06-25    LIVER FUNCTIONS - ( 2020 12:38 )  Alb: 3.7 g/dL / Pro: 5.5 g/dL / ALK PHOS: 85 U/L / ALT: 16 U/L / AST: 21 U/L / GGT: x             Interval Diagnostic Studies: see sunrise for full report

## 2020-06-25 NOTE — PROGRESS NOTE ADULT - ASSESSMENT
63 y/o M PMHx decompensated JEAN Cirrhosis on transplant list, DMII, HFpEF, recent admission for ESBL E coli prostatic abscess 2/2 4 wks ertapenem, hx of ESBL UTIs, recent VRE urinary colonization came to hospital for worsening jaundice and episode of confusion, resolved PTA.     CT Pelvis with no evidence of abscess (but noncontrast)  BCx 6/12 with NGTD  UCx 6/14 with VRE  Mental status improved after initiation of linezolid  s/p 7 day empiric coverage for VRE    Noted to have purulent drainage from the right heel eschar by transplant surgery  No obvious drainage at time of my assessments  Podiatry without concerns for infection on reevaluation  MRI of foot without obvious infection or OM    Hypotension on 6/21 and drop in H/H  Blood Cultures (6/21) with NGTD  UCx (6/21) with <10K organisms  EGD on 6/22 with Acute gastritis with hemorrhage and Acute duodenitis with hemorrhage.    Had leukocytosis and still some encephalopathy which could be related to hemorrhage   U/A (6/24) with 9 WBC  BCx (6/24) with NGTD     RECOMMENDATIONS    #Leukocytosis (increasing), Hypotension, Encephalopathy, Positive UCx, VRE Colonization  --Continue Bactrim DS PO QD for now for UTI/SBP PPx  --Continue to follow CBC with diff  --Continue to follow temperature curve  --Follow up on repeat blood cultures    #MISA, Prophylactic Antibiotic  --Continue Bactrim DS PO QD for now for UTI/SBP PPx (previous recurrent ESBL E coli infection)  --Appreciate Nephro and transplant hepatology recommendations    #R Heel Eschar  --Monitor off of antibiotics     #Pre-OLT Evaluation, Encounter for Vaccination,  --No absolute ID contraindications for OLT but I would weigh risks and benefits for proceeding with OLT; patient has leukocytosis and encephalopathy which may be due to GIB but BCx negative only 24 hours  --Patient will require Daptomycin and Meropenem for danilo-operative prophylaxis  --PCV 13 and 23 vaccination completed February 2020  --Will require HBV Vaccination - can hold off for now    I will continue to follow. Please feel free to contact me with any further questions.    Eusebio Pérez M.D.  [facility] Division of Infectious Disease  8AM-5PM: Pager Number [phone]  After Hours (or if no response): Please contact the Infectious Diseases Office at [phone]     The above assessment and plan were discussed with HEATHER ALDRIDGE, Dr Letitia Mo

## 2020-06-25 NOTE — PROGRESS NOTE ADULT - SUBJECTIVE AND OBJECTIVE BOX
Follow Up:  pre-OLT, leukocytosis    Interval History: afebrile overnight. no acute events.     REVIEW OF SYSTEMS  [  ] ROS unobtainable because:    [ x ] All other systems negative except as noted below    Constitutional:  [ ] fever [ ] chills  [ ] weight loss  [ ] weakness  Skin:  [ ] rash [ ] phlebitis	  Eyes: [ ] icterus [ ] pain  [ ] discharge	  ENMT: [ ] sore throat  [ ] thrush [ ] ulcers [ ] exudates  Respiratory: [ ] dyspnea [ ] hemoptysis [ ] cough [ ] sputum	  Cardiovascular:  [ ] chest pain [ ] palpitations [ ] edema	  Gastrointestinal:  [ ] nausea [ ] vomiting [ ] diarrhea [ ] constipation [ ] pain	  Genitourinary:  [ ] dysuria [ ] frequency [ ] hematuria [ ] discharge [ ] flank pain  [ ] incontinence  Musculoskeletal:  [ ] myalgias [ ] arthralgias [ ] arthritis  [ ] back pain  Neurological:  [ ] headache [ ] seizures  [ ] confusion/altered mental status    Allergies  codeine (Anaphylaxis)        ANTIMICROBIALS:  nystatin    Suspension 438861 four times a day  rifAXIMin 550 two times a day  trimethoprim  160 mG/sulfamethoxazole 800 mG 1 daily      OTHER MEDS:  MEDICATIONS  (STANDING):  desmopressin IVPB 20 once  epoetin barry-epbx (RETACRIT) Injectable 67126 <User Schedule>  hepatitis B Vaccine - Adult (ENGERIX-B) 20 once  insulin glargine Injectable (LANTUS) 7 at bedtime  insulin lispro (HumaLOG) corrective regimen sliding scale  three times a day before meals  insulin lispro (HumaLOG) corrective regimen sliding scale  at bedtime  insulin lispro Injectable (HumaLOG) 3 three times a day before meals  lactulose Syrup 30 every 6 hours  midodrine. 20 three times a day  octreotide  Infusion 50 <Continuous>  ondansetron Injectable 4 every 8 hours  pantoprazole  Injectable 40 every 12 hours  tamsulosin 0.4 at bedtime      Vital Signs Last 24 Hrs  T(C): 36.1 (2020 15:00), Max: 37.4 (2020 19:00)  T(F): 97 (2020 15:00), Max: 99.3 (2020 19:00)  HR: 90 (2020 18:00) (85 - 98)  BP: 139/66 (2020 18:00) (115/57 - 144/67)  BP(mean): 95 (2020 18:00) (82 - 96)  RR: 19 (2020 18:00) (9 - 22)  SpO2: 99% (2020 18:00) (92% - 99%)    PHYSICAL EXAMINATION:  General: Alert and Awake, oriented 3x but still inappropriate at times, NAD, jaundice  HEENT: PERRL, EOMI, scleral icterus  Neck: Supple  Cardiac: RRR, No M/R/G  Resp: CTAB, No Wh/Rh/Ra  Abdomen: NBS, NT/ND, No HSM, No rigidity or guarding  MSK: R heel with eschar without surrounding drainage or underlying fluctuance.  1+ LE edema. No Calf tenderness  Skin: No rashes or lesions. Skin is warm and dry to the touch.   Neuro: Alert and Awake. oriented 3x CN 2-12 Grossly intact. Moves all four extremities spontaneously.  Psych: Calm, Pleasant, Cooperative                          7.7    9.87  )-----------( 37       ( 2020 12:38 )             23.3       06-25    134<L>  |  105  |  50<H>  ----------------------------<  181<H>  5.5<H>   |  17<L>  |  1.14    Ca    10.7<H>      2020 12:38  Phos  3.3     06-  Mg     2.1     06-25    TPro  5.5<L>  /  Alb  3.7  /  TBili  17.6<H>  /  DBili  3.1<H>  /  AST  21  /  ALT  16  /  AlkPhos  85  06-25      Urinalysis Basic - ( 2020 16:37 )    Color: Yellow / Appearance: Clear / S.017 / pH: x  Gluc: x / Ketone: Negative  / Bili: Negative / Urobili: Negative   Blood: x / Protein: Trace / Nitrite: Negative   Leuk Esterase: Moderate / RBC: 116 /hpf / WBC 9 /HPF   Sq Epi: x / Non Sq Epi: 1 / Bacteria: Negative        MICROBIOLOGY:  v  .Blood Blood  20   No growth to date.  --  --      .Urine Clean Catch (Midstream)  20   <10,000 CFU/ml of other organisms  --  --      .Blood Blood-Peripheral  20   No growth to date.  --  --      .Urine Catheterized  20   >100,000 CFU/ml Enterococcus faecium (vancomycin resistant)  --  Enterococcus faecium (vancomycin resistant)      .Urine Clean Catch (Midstream)  20   >100,000 CFU/ml Enterococcus faecium (vancomycin resistant)  --  Enterococcus faecium (vancomycin resistant)      .Blood Blood-Peripheral  20   No Growth Final  --  --        CMV IgG Antibody: <0.20 U/mL (19 @ 08:33)  Toxoplasma IgG Screen: <3.0 IU/mL (19 @ 08:33)      RADIOLOGY:    <The imaging below has been reviewed and visualized by me independently. Findings as detailed in report below>    EXAM:  US DPLX ABDOMEN                        PROCEDURE DATE:  2020    Limited study.  IVC, main portal vein, left portal vein, and splenic vein at the hilum are patent. Prominent patent superior mesenteric vein.  Nonvisualization of the right portal vein, hepatic arteries, and splenic confluence.

## 2020-06-25 NOTE — PROGRESS NOTE ADULT - ASSESSMENT
encepaholapthy  anemia  advance cirrhosis  GIB     plan  hepatology and transplant evaluation noted  meld at 35  s/p 1uprbc today   care per sicu  cont antibiotics  diet as tolerated  monitor for signs of bleeding   monitor bm on lactulose  will follow

## 2020-06-25 NOTE — PROGRESS NOTE ADULT - SUBJECTIVE AND OBJECTIVE BOX
HISTORY  64y Male PMHx of decompensated JEAN cirrhosis c/b small esophageal varices (12/2019), portal hypertensive gastropathy, ascites, hx SBP, hx hepatic encephalopathy, GAVE syndrome s/p APC, hx duodenal ulcer (5/2019), HTN, HLD, DM, HFpEF (EF 70%), recent ESBL E coli prostate abscess (3/2020 s/p 4 weeks ertapenem), orthostatic hypotension 2/2 midodrine presents with reports of confusion. Saw his hepatologist was noted to be more jaundiced and more confused than normal. Per wife his was not oriented to time and place, and forgot how many grandchildren he had. He also urinated in his bedroom. He was confused for roughly 12 hours. He is now back to his baseline mental status. He denies fever, chills, dysuria, increased urinary frequency, flank pain, neck stiffness. No melena, hematochezia, hematemesis. Patient found to have VRE UTI, being treated with linezolid.     Hospital course: Pt had black tarry BM this AM, first bloody BM this admission Hg dropped to 7 from 7.7. Pt worked with PT and was sat up in bed. He became dizzy and confused, BP measured to be 90/41, then improved to 119/64 when placed supine again. Pt remembers the whole event. He currently is at baseline mental status and eating lunch. Denies CP, SOB, abd pain, N/V/D. EGD 12/2019 showed small nonbleeding esophageal varices, portal hypertensive gastropathy, and GAVE s/p APC. Initially MICU consulted, ICU not indicated at that time. Patient received 1u PRBC on the floor with minimal response in H/H. Transplant team concerned for worsening hepatic encephalopathy and mental status. Patient AAOx3; however, per transplant team having difficulty spelling words. Patient transferred to SICU for closer monitoring.        24 HOUR EVENTS:  - Diet advanced to clears  - Transfused 1 unit pRBC with inadequate response as well as 1 one platelets and 1 unit cryoprecipitate   - 2% saline decreased to 30ml/hr    SUBJECTIVE/ROS:  [ ] A ten-point review of systems was otherwise negative except as noted.  [ ] Due to altered mental status/intubation, subjective information were not able to be obtained from the patient. History was obtained, to the extent possible, from review of the chart and collateral sources of information.      NEURO  Exam: awake, alert, oriented  Meds: ondansetron Injectable 4 milliGRAM(s) IV Push every 8 hours    [x] Adequacy of sedation and pain control has been assessed and adjusted      RESPIRATORY  RR: 12 (06-25-20 @ 01:00) (10 - 30)  SpO2: 93% (06-25-20 @ 01:00) (93% - 100%)  Exam: unlabored, clear to auscultation bilaterally  Mechanical Ventilation: none  [N/A] Extubation Readiness Assessed  Meds: none      CARDIOVASCULAR  HR: 96 (06-25-20 @ 01:00) (90 - 97)  BP: 142/64 (06-25-20 @ 01:00) (103/55 - 143/65)  BP(mean): 92 (06-25-20 @ 01:00) (73 - 93)  Exam: regular rate and rhythm  Cardiac Rhythm: sinus  Perfusion     [x]Adequate   [ ]Inadequate  Mentation   [x]Normal       [ ]Reduced  Extremities  [x]Warm         [ ]Cool  Volume Status [ ]Hypervolemic [x]Euvolemic [ ]Hypovolemic  Meds: midodrine. 20 milliGRAM(s) Oral three times a day  tamsulosin 0.4 milliGRAM(s) Oral at bedtime        GI/NUTRITION  Exam: soft, nontender, nondistended  Diet: soft diet   Meds: lactulose Syrup 30 Gram(s) Oral every 6 hours  pantoprazole  Injectable 40 milliGRAM(s) IV Push every 12 hours      GENITOURINARY  I&O's Detail    06-23 @ 07:01  -  06-24 @ 07:00  --------------------------------------------------------  IN:    Oral Fluid: 200 mL    Packed Red Blood Cells: 650 mL    sodium chloride 2% .: 1200 mL  Total IN: 2050 mL    OUT:    Voided: 1650 mL  Total OUT: 1650 mL    Total NET: 400 mL      06-24 @ 07:01  -  06-25 @ 03:37  --------------------------------------------------------  IN:    Cryoprecipitate: 75 mL    Oral Fluid: 1300 mL    Packed Red Blood Cells: 300 mL    Platelets - Single Donor: 250 mL    sodium chloride 2% .: 900 mL    Solution: 100 mL  Total IN: 2925 mL    OUT:    Intermittent Catheterization - Urethral: 750 mL    Stool: 400 mL    Voided: 350 mL  Total OUT: 1500 mL    Total NET: 1425 mL          06-25    134<L>  |  106  |  46<H>  ----------------------------<  114<H>  5.0   |  18<L>  |  1.17    Ca    10.7<H>      25 Jun 2020 01:08  Phos  2.3     06-25  Mg     2.4     06-25    TPro  5.5<L>  /  Alb  3.8  /  TBili  14.8<H>  /  DBili  2.4<H>  /  AST  26  /  ALT  13  /  AlkPhos  81  06-25    [x] Beasley catheter, indication: urine output monitoring in critically ill   Meds: folic acid 1 milliGRAM(s) Oral daily  sodium chloride 1 Gram(s) Oral every 8 hours  sodium chloride 2% . 1000 milliLiter(s) IV Continuous <Continuous>        HEMATOLOGIC  Meds:   [x] VTE Prophylaxis                        8.0    11.24 )-----------( 42       ( 25 Jun 2020 01:08 )             23.5     PT/INR - ( 25 Jun 2020 01:08 )   PT: 27.9 sec;   INR: 2.36 ratio         PTT - ( 25 Jun 2020 01:08 )  PTT:57.0 sec  Transfusion     [ ] PRBC   [ ] Platelets   [ ] FFP   [ ] Cryoprecipitate      INFECTIOUS DISEASES  WBC Count: 11.24 K/uL (06-25 @ 01:08)  WBC Count: 11.57 K/uL (06-24 @ 16:37)  WBC Count: 11.02 K/uL (06-24 @ 10:35)  WBC Count: 12.44 K/uL (06-24 @ 07:05)    RECENT CULTURES:  Specimen Source: .Urine Clean Catch (Midstream)  Date/Time: 06-21 @ 21:55  Culture Results:   <10,000 CFU/ml of other organisms  Gram Stain: --  Organism: --  Specimen Source: .Blood Blood-Peripheral  Date/Time: 06-21 @ 21:53  Culture Results:   No growth to date.  Gram Stain: --  Organism: --    Meds: epoetin barry-epbx (RETACRIT) Injectable 20816 Unit(s) SubCutaneous <User Schedule>  hepatitis B Vaccine - Adult (ENGERIX-B) 20 MICROGram(s) IntraMuscular once  nystatin    Suspension 498326 Unit(s) Oral four times a day  rifAXIMin 550 milliGRAM(s) Oral two times a day  trimethoprim  160 mG/sulfamethoxazole 800 mG 1 Tablet(s) Oral daily        ENDOCRINE  CAPILLARY BLOOD GLUCOSE      POCT Blood Glucose.: 116 mg/dL (24 Jun 2020 23:54)  POCT Blood Glucose.: 127 mg/dL (24 Jun 2020 17:17)  POCT Blood Glucose.: 207 mg/dL (24 Jun 2020 12:29)  POCT Blood Glucose.: 129 mg/dL (24 Jun 2020 08:18)    Meds: insulin glargine Injectable (LANTUS) 7 Unit(s) SubCutaneous at bedtime  insulin lispro (HumaLOG) corrective regimen sliding scale   SubCutaneous three times a day before meals  insulin lispro (HumaLOG) corrective regimen sliding scale   SubCutaneous at bedtime  insulin lispro Injectable (HumaLOG) 3 Unit(s) SubCutaneous three times a day before meals  octreotide  Infusion 50 MICROgram(s)/Hr IV Continuous <Continuous>        ACCESS DEVICES:  [x] Peripheral IV  [ ] Central Venous Line	[ ] R	[ ] L	[ ] IJ	[ ] Fem	[ ] SC	Placed:   [ ] Arterial Line		[ ] R	[ ] L	[ ] Fem	[ ] Rad	[ ] Ax	Placed:   [ ] PICC:					[ ] Mediport  [ ] Urinary Catheter, Date Placed:   [x] Necessity of urinary, arterial, and venous catheters discussed    OTHER MEDICATIONS:  chlorhexidine 2% Cloths 1 Application(s) Topical <User Schedule>  nystatin Cream 1 Application(s) Topical two times a day  silver sulfADIAZINE 1% Cream 1 Application(s) Topical two times a day      CODE STATUS: full code       IMAGING: < from: CT Pelvis No Cont (06.12.20 @ 17:37) >  IMPRESSION:     Evaluation without intravenous contrast. No prostate abscess noted within the limitations of the exam.    < end of copied text >

## 2020-06-25 NOTE — PROGRESS NOTE ADULT - SUBJECTIVE AND OBJECTIVE BOX
Transplant Surgery    Present:   Patient seen with multidisciplinary team including Transplant Surgeon: Dr. Chris, Dr. Gray, Hepatologist Dr. Mo/Milton, Transplant Nephrologist Dr Salvador, Nephrology fellow, OLY Bingham during am rounds and examined with Dr. Gray.   Disciplines not in attendance will be notified of the plan    HPI:  63 y/o gentleman with IDDM, dyslipidemia, obesity, GERD, HFpEF with mild LV diastolic dysfunction, MGUS, hronic anemia with a history of duodenal ulcer as well as GAVE and duodenal AVM s/p APC (last on 10/11/19), 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 UNOS listed for liver transplantation at [facility]. 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     Re-admitted with:   ·	worsening HE  ·	UTI/VRE: tx with linezolid 6/15-22, now on bactrim DS 6/22 -   ·	MISA/Hyponatremia ()   ·	UGI bleed (melena) s/p EGD (6/22) c/w hemorrhagic duodenitis/gastritis; Grade 2 EV; requiring PRBC 1u(6/17), 2u (6/21), 1u (6/23), 1u (6/24)  ·	Known h/o R foot ulcer (MRI neg for OM)    Interval Events:   - stable VSS, afebrile  - AMS: AOx 1, + asterixis, on rifaximin and lactulose   - bld cxs (6/21) with NGTD, urine cxs (6/21) with NG   - Rectal tube removed yesterday; no melena overnight, BM x 4 overnight   - Received 2u PRBc, 1uCryo, 1u plt yesterday, H/H 7.7/22 this am    Total this week required ~6u PRBCs  - started on octreotide yesterday; plan for DDAVP    MEDICATIONS  (STANDING):  chlorhexidine 2% Cloths 1 Application(s) Topical <User Schedule>  epoetin barry-epbx (RETACRIT) Injectable 05518 Unit(s) SubCutaneous <User Schedule>  folic acid 1 milliGRAM(s) Oral daily  hepatitis B Vaccine - Adult (ENGERIX-B) 20 MICROGram(s) IntraMuscular once  insulin glargine Injectable (LANTUS) 7 Unit(s) SubCutaneous at bedtime  insulin lispro (HumaLOG) corrective regimen sliding scale   SubCutaneous three times a day before meals  insulin lispro (HumaLOG) corrective regimen sliding scale   SubCutaneous at bedtime  insulin lispro Injectable (HumaLOG) 3 Unit(s) SubCutaneous three times a day before meals  lactulose Syrup 30 Gram(s) Oral every 6 hours  midodrine. 20 milliGRAM(s) Oral three times a day  nystatin    Suspension 975791 Unit(s) Oral four times a day  nystatin Cream 1 Application(s) Topical two times a day  octreotide  Infusion 50 MICROgram(s)/Hr (10 mL/Hr) IV Continuous <Continuous>  ondansetron Injectable 4 milliGRAM(s) IV Push every 8 hours  pantoprazole  Injectable 40 milliGRAM(s) IV Push every 12 hours  rifAXIMin 550 milliGRAM(s) Oral two times a day  silver sulfADIAZINE 1% Cream 1 Application(s) Topical two times a day  sodium chloride 1 Gram(s) Oral every 8 hours  tamsulosin 0.4 milliGRAM(s) Oral at bedtime  trimethoprim  160 mG/sulfamethoxazole 800 mG 1 Tablet(s) Oral daily    MEDICATIONS  (PRN):      PAST MEDICAL & SURGICAL HISTORY:  GIB (gastrointestinal bleeding)  GERD with esophagitis: Gastritis &amp; Non Bleeding Ulcers  Hepatic encephalopathy  Obesity  Fatty liver disease, nonalcoholic  Renal stones: 25 years ago  Hypertension  Neuropathy  Hypercholesteremia  Diabetes  S/P cholecystectomy      Vital Signs Last 24 Hrs  T(C): 36.2 (25 Jun 2020 11:00), Max: 37.4 (24 Jun 2020 19:00)  T(F): 97.2 (25 Jun 2020 11:00), Max: 99.3 (24 Jun 2020 19:00)  HR: 96 (25 Jun 2020 11:00) (90 - 98)  BP: 135/63 (25 Jun 2020 11:00) (115/57 - 143/65)  BP(mean): 91 (25 Jun 2020 11:00) (82 - 95)  RR: 15 (25 Jun 2020 11:00) (9 - 20)  SpO2: 94% (25 Jun 2020 11:00) (93% - 98%)    I&O's Summary    24 Jun 2020 07:01  -  25 Jun 2020 07:00  --------------------------------------------------------  IN: 3385 mL / OUT: 2950 mL / NET: 435 mL    25 Jun 2020 07:01  -  25 Jun 2020 11:24  --------------------------------------------------------  IN: 720 mL / OUT: 0 mL / NET: 720 mL                         7.7    10.43 )-----------( 39       ( 25 Jun 2020 06:41 )             22.8     06-25    135  |  107  |  50<H>  ----------------------------<  98  5.3   |  16<L>  |  1.17    Ca    10.9<H>      25 Jun 2020 06:41  Phos  2.5     06-25  Mg     2.3     06-25    TPro  5.5<L>  /  Alb  3.6  /  TBili  16.7<H>  /  DBili  2.7<H>  /  AST  23  /  ALT  15  /  AlkPhos  79  06-25      Culture - Urine (collected 06-21-20 @ 21:55)  Source: .Urine Clean Catch (Midstream)  Final Report (06-22-20 @ 17:03):    <10,000 CFU/ml of other organisms    Culture - Blood (collected 06-21-20 @ 21:53)  Source: .Blood Blood-Peripheral  Preliminary Report (06-22-20 @ 22:01):    No growth to date.    Culture - Blood (collected 06-21-20 @ 21:53)  Source: .Blood Blood-Peripheral  Preliminary Report (06-22-20 @ 22:01):    No growth to date.    Review of systems  Gen: No weight changes, fatigue, fevers/chills, weakness  Skin: Jaundice, +R heel ulcer  Head/Eyes/Ears/Mouth: No headache; Normal hearing; Normal vision w/o blurriness; No sinus pain/discomfort.    Respiratory: No dyspnea, cough, wheezing, hemoptysis  CV: No chest pain, PND, orthopnea  GI: denies diarrhea, constipation, nausea, vomiting, melena, hematochezia  : No increased frequency, dysuria, hematuria, nocturia  MSK: No joint pain/swelling; no back pain; no edema  Neuro: No dizziness/lightheadedness, weakness, seizures, numbness, tingling  Heme: No easy bruising or bleeding  Endo: No heat/cold intolerance  Psych: No significant nervousness, anxiety, stress, depression  All other systems were reviewed and are negative, except as noted.      PHYSICAL EXAM:  Constitutional: Well developed / well nourished  Eyes: Anicteric, PERRLA  ENMT: nc/at  Neck: supple  Respiratory: CTA B/L  Cardiovascular: RRR  Abdominal:  soft, NT, ND  Genitourinary: Voiding spontaneously  Extremities: R heel wound w/ overlying eschar w/ no signs of infection.   Vascular: Palpable dp pulses bilaterally  Neurological: A&O x3  Skin: R heel breakdown, no rashes,  lesions   Musculoskeletal: Moving all extremities  Psychiatric: Responsive

## 2020-06-25 NOTE — PROGRESS NOTE ADULT - ATTENDING COMMENTS
Dr. Mace (Attending Physician)  Hepatic Encephalopathy on rifaximin, lactulose  dc'd hypertonic saline  decrease frequency of lab checks to q12 hours

## 2020-06-25 NOTE — PROGRESS NOTE ADULT - ASSESSMENT
Pt with MISA likely 2/2 pre renal and/or ATN in setting hypotension, less likely HRS (Concepcion>35, no response tx)    #Acute Kidney Injury  - MISA likely 2/2 pre renal ATN in setting hypotension, infection, decrease PO intake, less likely HRS (Concepcion>35, no response tx)  - Urine Na 112 ~ ATN, UPro/Cr 0.2 gram, UA showed UTI w/o protein/rbc.    - Borderline hypotension during hospitalization (on midodrine), no response to albumin/midodrine treated, less likely HRS given Concepcion>35. Good UOP during hospitalization, lasix/aldactone held.    - Baseline sCr 1.0 in 5/2020, on admission sCr 1.57 (6/11), uptrend to 1.75 on 6/13 and has since downtrended, Scr 1.15mg/dl today  - Prior MISA w/u Dec 2019 showed FLC elevated K/L ratio 2.91 (K>L) and immunofixation showing "one Weak IgA Lambda  Band and One Weak IgG Lambda Band Identified"  - C/w holding diuretics for now. Recommend to hold further albumin for now despite urine Na<35   - Monitor serial bladder sono to r/o urinary retention as bob was removed  - Avoid nephrotoxin meds such as NSAIDS, PPI, ACEI/ARB, and contrast agents.  - Renally dose medications per GFR.    #Hyponatremia  - Na 135  - Would c/w  fluid restrict 1L, encourage PO intake with protein, can add protein shakes if ok from hepatology standpoint, liberalize salt intake  - Hold diuretics for now. D/c 2% HTS    - Would repeat urine Na, Urine osmolality    #Hyperkalemia  - K 5.3, mild elevated in setting of pRBC transfusion  - Would monitor at this time, can give low K diet      #Anemia  - Started ARANZA 10K units TIW  - pRBC transfusion as needed, EGD from 6/22 with GAVE, with inadequate response to pRBC  - Hgb 7.7 this am    Abelino Patterson   Nephrology Fellow  Pager NS: [phone]/ [facility]: [phone]  (After 5 pm or on weekends please page the on-call fellow)

## 2020-06-25 NOTE — PROGRESS NOTE ADULT - ASSESSMENT
Neuro: [place] Grade I hepatic encephalopathy  - AAOx4, mild asterixis   - monitor mental status   - no Tylenol, narcotics, or benzos    Resp: No active issues  - On room air saturating well     Cardiac: Orthostatic hypotension  - Continue with midodrine 20 TID    GI: Esophagogastroduodenoscopy 6/22 with gastritis, duodenitis with hemorrhage, Grade II esophageal varices  - CLD as tolerated  - c/w lactulose, rifaximin  - c/w Protonix BID  - monitor LFTs  - monitor ammonia   - U/S 6/22 without evidence of ascites, pending portal vein duplex  - Hepatitis B vaccine pending  - Folate    Renal: MISA on CKD. Hyponatremia  - 2% NS@30cc/hr as per nephrology  - Continue salt tabs 1g q8h  - Monitor Cr (baseline ~1.1)  - Epogen  - Tamsulosin 0.4    Heme: Leukopenia, Anemia, Thrombocytopenia  - Received Filgrastim  - Holding DVT prophylaxis  - Trend INR    ID: Hx of VRE UTI  - Appreciate ID recs, antibiotics adjusted to DS Bactrim (from Linezolid)  - c/w rifaximin     Endo: DM  - monitor FSG   - Lantus 7u, with 3u Humalog Premeal  - ISS    Dispo: SICU full code    - Wander Aguirre PA-C

## 2020-06-26 DIAGNOSIS — E87.8 OTHER DISORDERS OF ELECTROLYTE AND FLUID BALANCE, NOT ELSEWHERE CLASSIFIED: ICD-10-CM

## 2020-06-26 LAB
ALBUMIN SERPL ELPH-MCNC: 3.8 G/DL — SIGNIFICANT CHANGE UP (ref 3.3–5)
ALP SERPL-CCNC: 95 U/L — SIGNIFICANT CHANGE UP (ref 40–120)
ALT FLD-CCNC: 17 U/L — SIGNIFICANT CHANGE UP (ref 10–45)
AMMONIA BLD-MCNC: 70 UMOL/L — HIGH (ref 11–55)
ANION GAP SERPL CALC-SCNC: 11 MMOL/L — SIGNIFICANT CHANGE UP (ref 5–17)
AST SERPL-CCNC: 21 U/L — SIGNIFICANT CHANGE UP (ref 10–40)
BILIRUB DIRECT SERPL-MCNC: 3.4 MG/DL — HIGH (ref 0–0.2)
BILIRUB INDIRECT FLD-MCNC: 15.2 MG/DL — HIGH (ref 0.2–1)
BILIRUB SERPL-MCNC: 18.6 MG/DL — HIGH (ref 0.2–1.2)
BUN SERPL-MCNC: 53 MG/DL — HIGH (ref 7–23)
CALCIUM SERPL-MCNC: 11.1 MG/DL — HIGH (ref 8.4–10.5)
CHLORIDE SERPL-SCNC: 104 MMOL/L — SIGNIFICANT CHANGE UP (ref 96–108)
CO2 SERPL-SCNC: 18 MMOL/L — LOW (ref 22–31)
CREAT SERPL-MCNC: 1.16 MG/DL — SIGNIFICANT CHANGE UP (ref 0.5–1.3)
CULTURE RESULTS: SIGNIFICANT CHANGE UP
CULTURE RESULTS: SIGNIFICANT CHANGE UP
GLUCOSE BLDC GLUCOMTR-MCNC: 168 MG/DL — HIGH (ref 70–99)
GLUCOSE BLDC GLUCOMTR-MCNC: 217 MG/DL — HIGH (ref 70–99)
GLUCOSE BLDC GLUCOMTR-MCNC: 223 MG/DL — HIGH (ref 70–99)
GLUCOSE BLDC GLUCOMTR-MCNC: 230 MG/DL — HIGH (ref 70–99)
GLUCOSE SERPL-MCNC: 226 MG/DL — HIGH (ref 70–99)
H PYLORI AG STL QL: DETECTED
HCT VFR BLD CALC: 22.1 % — LOW (ref 39–50)
HCT VFR BLD CALC: 23.3 % — LOW (ref 39–50)
HGB BLD-MCNC: 7.4 G/DL — LOW (ref 13–17)
HGB BLD-MCNC: 7.8 G/DL — LOW (ref 13–17)
MAGNESIUM SERPL-MCNC: 1.9 MG/DL — SIGNIFICANT CHANGE UP (ref 1.6–2.6)
MCHC RBC-ENTMCNC: 33.2 PG — SIGNIFICANT CHANGE UP (ref 27–34)
MCHC RBC-ENTMCNC: 33.5 GM/DL — SIGNIFICANT CHANGE UP (ref 32–36)
MCHC RBC-ENTMCNC: 33.5 GM/DL — SIGNIFICANT CHANGE UP (ref 32–36)
MCHC RBC-ENTMCNC: 33.6 PG — SIGNIFICANT CHANGE UP (ref 27–34)
MCV RBC AUTO: 100.4 FL — HIGH (ref 80–100)
MCV RBC AUTO: 99.1 FL — SIGNIFICANT CHANGE UP (ref 80–100)
NRBC # BLD: 0 /100 WBCS — SIGNIFICANT CHANGE UP (ref 0–0)
NRBC # BLD: 0 /100 WBCS — SIGNIFICANT CHANGE UP (ref 0–0)
PHOSPHATE SERPL-MCNC: 2.5 MG/DL — SIGNIFICANT CHANGE UP (ref 2.5–4.5)
PLATELET # BLD AUTO: 28 K/UL — LOW (ref 150–400)
PLATELET # BLD AUTO: 30 K/UL — LOW (ref 150–400)
POTASSIUM SERPL-MCNC: 5.4 MMOL/L — HIGH (ref 3.5–5.3)
POTASSIUM SERPL-SCNC: 5.4 MMOL/L — HIGH (ref 3.5–5.3)
PROT SERPL-MCNC: 5.6 G/DL — LOW (ref 6–8.3)
RBC # BLD: 2.23 M/UL — LOW (ref 4.2–5.8)
RBC # BLD: 2.32 M/UL — LOW (ref 4.2–5.8)
RBC # FLD: 20.4 % — HIGH (ref 10.3–14.5)
RBC # FLD: 20.8 % — HIGH (ref 10.3–14.5)
SARS-COV-2 RNA SPEC QL NAA+PROBE: SIGNIFICANT CHANGE UP
SODIUM SERPL-SCNC: 133 MMOL/L — LOW (ref 135–145)
SPECIMEN SOURCE: SIGNIFICANT CHANGE UP
SPECIMEN SOURCE: SIGNIFICANT CHANGE UP
WBC # BLD: 5.7 K/UL — SIGNIFICANT CHANGE UP (ref 3.8–10.5)
WBC # BLD: 6.81 K/UL — SIGNIFICANT CHANGE UP (ref 3.8–10.5)
WBC # FLD AUTO: 5.7 K/UL — SIGNIFICANT CHANGE UP (ref 3.8–10.5)
WBC # FLD AUTO: 6.81 K/UL — SIGNIFICANT CHANGE UP (ref 3.8–10.5)

## 2020-06-26 PROCEDURE — 99232 SBSQ HOSP IP/OBS MODERATE 35: CPT

## 2020-06-26 PROCEDURE — 99233 SBSQ HOSP IP/OBS HIGH 50: CPT | Mod: GC

## 2020-06-26 PROCEDURE — 99232 SBSQ HOSP IP/OBS MODERATE 35: CPT | Mod: GC

## 2020-06-26 RX ORDER — INSULIN GLARGINE 100 [IU]/ML
9 INJECTION, SOLUTION SUBCUTANEOUS AT BEDTIME
Refills: 0 | Status: DISCONTINUED | OUTPATIENT
Start: 2020-06-26 | End: 2020-06-30

## 2020-06-26 RX ORDER — MAGNESIUM SULFATE 500 MG/ML
1 VIAL (ML) INJECTION ONCE
Refills: 0 | Status: COMPLETED | OUTPATIENT
Start: 2020-06-26 | End: 2020-06-26

## 2020-06-26 RX ADMIN — ERYTHROPOIETIN 10000 UNIT(S): 10000 INJECTION, SOLUTION INTRAVENOUS; SUBCUTANEOUS at 14:34

## 2020-06-26 RX ADMIN — Medication 500000 UNIT(S): at 17:23

## 2020-06-26 RX ADMIN — Medication 2: at 13:15

## 2020-06-26 RX ADMIN — SODIUM CHLORIDE 1 GRAM(S): 9 INJECTION INTRAMUSCULAR; INTRAVENOUS; SUBCUTANEOUS at 06:22

## 2020-06-26 RX ADMIN — Medication 100 GRAM(S): at 17:20

## 2020-06-26 RX ADMIN — ONDANSETRON 4 MILLIGRAM(S): 8 TABLET, FILM COATED ORAL at 22:09

## 2020-06-26 RX ADMIN — Medication 2: at 17:00

## 2020-06-26 RX ADMIN — Medication 500000 UNIT(S): at 11:55

## 2020-06-26 RX ADMIN — Medication 3 UNIT(S): at 17:00

## 2020-06-26 RX ADMIN — INSULIN GLARGINE 7 UNIT(S): 100 INJECTION, SOLUTION SUBCUTANEOUS at 22:09

## 2020-06-26 RX ADMIN — CHLORHEXIDINE GLUCONATE 1 APPLICATION(S): 213 SOLUTION TOPICAL at 04:13

## 2020-06-26 RX ADMIN — NYSTATIN CREAM 1 APPLICATION(S): 100000 CREAM TOPICAL at 06:22

## 2020-06-26 RX ADMIN — Medication 500000 UNIT(S): at 06:22

## 2020-06-26 RX ADMIN — LACTULOSE 30 GRAM(S): 10 SOLUTION ORAL at 17:23

## 2020-06-26 RX ADMIN — Medication 1: at 06:29

## 2020-06-26 RX ADMIN — Medication 3 UNIT(S): at 08:34

## 2020-06-26 RX ADMIN — Medication 1 TABLET(S): at 11:56

## 2020-06-26 RX ADMIN — ONDANSETRON 4 MILLIGRAM(S): 8 TABLET, FILM COATED ORAL at 13:17

## 2020-06-26 RX ADMIN — Medication 1 APPLICATION(S): at 17:23

## 2020-06-26 RX ADMIN — LACTULOSE 30 GRAM(S): 10 SOLUTION ORAL at 06:21

## 2020-06-26 RX ADMIN — Medication 62.5 MILLIMOLE(S): at 17:15

## 2020-06-26 RX ADMIN — Medication 3 UNIT(S): at 13:15

## 2020-06-26 RX ADMIN — MIDODRINE HYDROCHLORIDE 20 MILLIGRAM(S): 2.5 TABLET ORAL at 17:20

## 2020-06-26 RX ADMIN — NYSTATIN CREAM 1 APPLICATION(S): 100000 CREAM TOPICAL at 17:22

## 2020-06-26 RX ADMIN — Medication 1 MILLIGRAM(S): at 11:56

## 2020-06-26 RX ADMIN — MIDODRINE HYDROCHLORIDE 20 MILLIGRAM(S): 2.5 TABLET ORAL at 11:55

## 2020-06-26 RX ADMIN — TAMSULOSIN HYDROCHLORIDE 0.4 MILLIGRAM(S): 0.4 CAPSULE ORAL at 22:09

## 2020-06-26 RX ADMIN — PANTOPRAZOLE SODIUM 40 MILLIGRAM(S): 20 TABLET, DELAYED RELEASE ORAL at 17:23

## 2020-06-26 RX ADMIN — Medication 1 APPLICATION(S): at 06:22

## 2020-06-26 RX ADMIN — MIDODRINE HYDROCHLORIDE 20 MILLIGRAM(S): 2.5 TABLET ORAL at 06:22

## 2020-06-26 RX ADMIN — PANTOPRAZOLE SODIUM 40 MILLIGRAM(S): 20 TABLET, DELAYED RELEASE ORAL at 06:21

## 2020-06-26 RX ADMIN — LACTULOSE 30 GRAM(S): 10 SOLUTION ORAL at 11:55

## 2020-06-26 NOTE — PROGRESS NOTE ADULT - SUBJECTIVE AND OBJECTIVE BOX
[facility] DIVISION OF KIDNEY DISEASES AND HYPERTENSION -- FOLLOW UP NOTE  --------------------------------------------------------------------------------  Abelino Patterson   Nephrology Fellow  Pager NS: [phone]/ [facility]: [phone]  (After 5 pm or on weekends please page the on-call fellow)    24 hour events/subjective: Vital signs, labs, medications reviewed. Patient had large BM this AM. Overnight had rectal tube placed that is now dislodged. DDAVP 20mcg, and 1u PRBC given overnight. Hgb 7.8        PAST HISTORY  --------------------------------------------------------------------------------  No significant changes to PMH, PSH, FHx, SHx, unless otherwise noted    ALLERGIES & MEDICATIONS  --------------------------------------------------------------------------------  Allergies    codeine (Anaphylaxis)    Intolerances      Standing Inpatient Medications  chlorhexidine 2% Cloths 1 Application(s) Topical <User Schedule>  epoetin barry-epbx (RETACRIT) Injectable 52680 Unit(s) SubCutaneous <User Schedule>  folic acid 1 milliGRAM(s) Oral daily  hepatitis B Vaccine - Adult (ENGERIX-B) 20 MICROGram(s) IntraMuscular once  insulin glargine Injectable (LANTUS) 7 Unit(s) SubCutaneous at bedtime  insulin lispro (HumaLOG) corrective regimen sliding scale   SubCutaneous three times a day before meals  insulin lispro (HumaLOG) corrective regimen sliding scale   SubCutaneous at bedtime  insulin lispro Injectable (HumaLOG) 3 Unit(s) SubCutaneous three times a day before meals  lactulose Syrup 30 Gram(s) Oral every 6 hours  midodrine. 20 milliGRAM(s) Oral three times a day  nystatin    Suspension 575724 Unit(s) Oral four times a day  nystatin Cream 1 Application(s) Topical two times a day  octreotide  Infusion 50 MICROgram(s)/Hr IV Continuous <Continuous>  ondansetron Injectable 4 milliGRAM(s) IV Push every 8 hours  pantoprazole  Injectable 40 milliGRAM(s) IV Push every 12 hours  rifAXIMin 550 milliGRAM(s) Oral two times a day  silver sulfADIAZINE 1% Cream 1 Application(s) Topical two times a day  tamsulosin 0.4 milliGRAM(s) Oral at bedtime  trimethoprim  160 mG/sulfamethoxazole 800 mG 1 Tablet(s) Oral daily    PRN Inpatient Medications      REVIEW OF SYSTEMS  --------------------------------------------------------------------------------  Gen: No fevers/chills  Head/Eyes/Ears/Mouth: No headache; Normal hearing; Normal vision    Respiratory: No dyspnea, cough  CV: No chest pain  GI: No abdominal pain, diarrhea, constipation, nausea, vomiting  : No increased frequency, dysuria  MSK: No joint pain/swelling; no edema    All other systems were reviewed and are negative, except as noted.    VITALS/PHYSICAL EXAM  --------------------------------------------------------------------------------  T(C): 36.7 (06-26-20 @ 07:00), Max: 36.7 (06-26-20 @ 03:00)  HR: 95 (06-26-20 @ 09:00) (85 - 96)  BP: 119/66 (06-26-20 @ 09:00) (92/63 - 144/67)  RR: 24 (06-26-20 @ 09:00) (7 - 30)  SpO2: 98% (06-26-20 @ 09:00) (92% - 99%)  Wt(kg): --        06-25-20 @ 07:01  -  06-26-20 @ 07:00  --------------------------------------------------------  IN: 1970 mL / OUT: 1453 mL / NET: 517 mL    06-26-20 @ 07:01  -  06-26-20 @ 10:22  --------------------------------------------------------  IN: 20 mL / OUT: 0 mL / NET: 20 mL      Physical Exam:    	Gen: NAD, well-appearing on room air  	HEENT: Scleral icterus  	Pulm: CTA B/L, no crackles  	CV: RRR, S1S2; no rub/murmur  	GI: +BS, soft, nontender  	: No suprapubic tenderness  	MSK: Warm, no edema              Neuro: AOx3, + mild asterixis   	Psych: Normal affect and mood  	Skin: Warm, no cyanosis      LABS/STUDIES  --------------------------------------------------------------------------------              7.8    6.81  >-----------<  30       [06-26-20 @ 08:54]              23.3     131  |  103  |  51  ----------------------------<  164      [06-25-20 @ 23:35]  5.2   |  22  |  1.16        Ca     10.7     [06-25-20 @ 23:35]      Mg     2.0     [06-25-20 @ 23:35]      Phos  2.6     [06-25-20 @ 23:35]    TPro  5.5  /  Alb  3.7  /  TBili  17.6  /  DBili  3.1  /  AST  24  /  ALT  15  /  AlkPhos  98  [06-25-20 @ 23:35]    PT/INR: PT 28.6 , INR 2.44       [06-25-20 @ 23:35]  PTT: 55.4       [06-25-20 @ 23:35]      Creatinine Trend:  SCr 1.16 [06-25 @ 23:35]  SCr 1.14 [06-25 @ 12:38]  SCr 1.17 [06-25 @ 06:41]  SCr 1.17 [06-25 @ 01:08]  SCr 1.25 [06-24 @ 16:37]              Urinalysis - [06-24-20 @ 16:37]      Color Yellow / Appearance Clear / SG 1.017 / pH 5.5      Gluc Negative / Ketone Negative  / Bili Negative / Urobili Negative       Blood Large / Protein Trace / Leuk Est Moderate / Nitrite Negative       / WBC 9 / Hyaline 0 / Gran  / Sq Epi  / Non Sq Epi 1 / Bacteria Negative    Urine Creatinine 106      [06-21-20 @ 11:33]  Urine Sodium <35      [06-23-20 @ 08:32]  Urine Urea Nitrogen 865      [06-21-20 @ 13:13]  Urine Osmolality 483      [06-23-20 @ 08:32]    Iron 160, TIBC Unable to calculate Test Repeated, %sat Unable to calculate Test Repeated      [06-12-20 @ 09:04]  Ferritin 493      [04-29-20 @ 08:20]  PTH -- (Ca 10.3)      [12-13-19 @ 08:11]   12  Vitamin D (25OH) 25.3      [12-13-19 @ 08:11]  HbA1c 6.4      [02-12-20 @ 17:08]  TSH 3.26      [04-26-20 @ 09:47]  Lipid: chol 71, TG 63, HDL 30, LDL 28      [11-27-19 @ 09:11]    HBsAb Nonreact      [06-13-20 @ 10:35]  HBsAg Nonreact      [06-13-20 @ 10:35]  HBcAb Nonreact      [06-13-20 @ 10:35]

## 2020-06-26 NOTE — PROGRESS NOTE ADULT - SUBJECTIVE AND OBJECTIVE BOX
SURGICAL INTENSIVE CARE UNIT DAILY PROGRESS NOTE    24 HOUR EVENTS:   - Continued on octreotide guttae.   - Received 1u pRBC, DDAVP per transplant.   - 2%NaCl d/c'ed.     HPI:  64y Male PMHx of decompensated JEAN cirrhosis c/b small esophageal varices (12/2019), portal hypertensive gastropathy, ascites, hx SBP, hx hepatic encephalopathy, GAVE syndrome s/p APC, hx duodenal ulcer (5/2019), HTN, HLD, DM, HFpEF (EF 70%), recent ESBL E coli prostate abscess (3/2020 s/p 4 weeks ertapenem), orthostatic hypotension 2/2 midodrine presents with reports of confusion. Saw his hepatologist was noted to be more jaundiced and more confused than normal. Per wife his was not oriented to time and place, and forgot how many grandchildren he had. He also urinated in his bedroom. He was confused for roughly 12 hours. He is now back to his baseline mental status. He denies fever, chills, dysuria, increased urinary frequency, flank pain, neck stiffness. No melena, hematochezia, hematemesis. Patient found to have VRE UTI, being treated with linezolid.     Hospital course: Pt had black tarry BM this AM, first bloody BM this admission Hg dropped to 7 from 7.7. Pt worked with PT and was sat up in bed. He became dizzy and confused, BP measured to be 90/41, then improved to 119/64 when placed supine again. Pt remembers the whole event. He currently is at baseline mental status and eating lunch. Denies CP, SOB, abd pain, N/V/D. EGD 12/2019 showed small nonbleeding esophageal varices, portal hypertensive gastropathy, and GAVE s/p APC. Initially MICU consulted, ICU not indicated at that time. Patient received 1u PRBC on the floor with minimal response in H/H. Transplant team concerned for worsening hepatic encephalopathy and mental status. Patient AAOx3; however, per transplant team having difficulty spelling words. Patient transferred to SICU for closer monitoring.        NEURO: Alert and orientedx3, mild asterixis.     RESPIRATORY  RR: 14 (06-26-20 @ 02:00) (7 - 24)  SpO2: 95% (06-26-20 @ 02:00) (92% - 99%)    CARDIOVASCULAR  HR: 95 (06-26-20 @ 02:00) (85 - 98)  BP: 125/58 (06-26-20 @ 02:00) (119/57 - 144/67)  BP(mean): 83 (06-26-20 @ 02:00) (74 - 96)    GI/NUTRITION  Diet: Mechanical soft.     GENITOURINARY  I&O's Detail    06-24 @ 07:01  -  06-25 @ 07:00  --------------------------------------------------------  IN:    Cryoprecipitate: 75 mL    octreotide  Infusion: 130 mL    Oral Fluid: 1300 mL    Packed Red Blood Cells: 300 mL    Platelets - Single Donor: 250 mL    sodium chloride 2%: 1230 mL    Solution: 100 mL  Total IN: 3385 mL    OUT:    Intermittent Catheterization - Urethral: 750 mL    Stool: 400 mL    Voided: 1800 mL  Total OUT: 2950 mL    Total NET: 435 mL      06-25 @ 07:01  -  06-26 @ 02:11  --------------------------------------------------------  IN:    octreotide  Infusion: 190 mL    Oral Fluid: 1100 mL    Packed Red Blood Cells: 300 mL    sodium chloride 2%: 30 mL    Solution: 250 mL    Solution: 50 mL  Total IN: 1920 mL    OUT:    Voided: 1100 mL  Total OUT: 1100 mL    Total NET: 820 mL          06-25    131<L>  |  103  |  51<H>  ----------------------------<  164<H>  5.2   |  22  |  1.16    Ca    10.7<H>      25 Jun 2020 23:35  Phos  2.6     06-25  Mg     2.0     06-25    TPro  5.5<L>  /  Alb  3.7  /  TBili  17.6<H>  /  DBili  3.1<H>  /  AST  24  /  ALT  15  /  AlkPhos  98  06-25      HEMATOLOGIC                        7.4    8.31  )-----------( 34       ( 25 Jun 2020 23:35 )             22.2     PT/INR - ( 25 Jun 2020 23:35 )   PT: 28.6 sec;   INR: 2.44 ratio         PTT - ( 25 Jun 2020 23:35 )  PTT:55.4 sec    INFECTIOUS DISEASES  RECENT CULTURES:  Specimen Source: .Blood Blood  Date/Time: 06-24 @ 14:09  Culture Results:   No growth to date.  Gram Stain: --  Organism: --  Specimen Source: .Urine Clean Catch (Midstream)  Date/Time: 06-21 @ 21:55  Culture Results:   <10,000 CFU/ml of other organisms  Gram Stain: --  Organism: --  Specimen Source: .Blood Blood-Peripheral  Date/Time: 06-21 @ 21:53  Culture Results:   No growth to date.  Gram Stain: --  Organism: --      ENDOCRINE  CAPILLARY BLOOD GLUCOSE      POCT Blood Glucose.: 172 mg/dL (25 Jun 2020 21:18)  POCT Blood Glucose.: 254 mg/dL (25 Jun 2020 16:13)  POCT Blood Glucose.: 170 mg/dL (25 Jun 2020 11:41)

## 2020-06-26 NOTE — PROGRESS NOTE ADULT - SUBJECTIVE AND OBJECTIVE BOX
Chief Complaint:  Patient is a 64y old  Male who presents with a chief complaint of Liver failure, hepatic encephalopathy (26 Jun 2020 10:53)    Reason for consult: HE, cirrhosis    Interval Events: No melena overnight, s/p removal of rectal tube. Total 7u pRBC this week.     Hospital Medications:  chlorhexidine 2% Cloths 1 Application(s) Topical <User Schedule>  epoetin barry-epbx (RETACRIT) Injectable 91953 Unit(s) SubCutaneous <User Schedule>  folic acid 1 milliGRAM(s) Oral daily  hepatitis B Vaccine - Adult (ENGERIX-B) 20 MICROGram(s) IntraMuscular once  insulin glargine Injectable (LANTUS) 7 Unit(s) SubCutaneous at bedtime  insulin lispro (HumaLOG) corrective regimen sliding scale   SubCutaneous three times a day before meals  insulin lispro (HumaLOG) corrective regimen sliding scale   SubCutaneous at bedtime  insulin lispro Injectable (HumaLOG) 3 Unit(s) SubCutaneous three times a day before meals  lactulose Syrup 30 Gram(s) Oral every 6 hours  midodrine. 20 milliGRAM(s) Oral three times a day  nystatin    Suspension 566270 Unit(s) Oral four times a day  nystatin Cream 1 Application(s) Topical two times a day  octreotide  Infusion 50 MICROgram(s)/Hr IV Continuous <Continuous>  ondansetron Injectable 4 milliGRAM(s) IV Push every 8 hours  pantoprazole  Injectable 40 milliGRAM(s) IV Push every 12 hours  rifAXIMin 550 milliGRAM(s) Oral two times a day  silver sulfADIAZINE 1% Cream 1 Application(s) Topical two times a day  tamsulosin 0.4 milliGRAM(s) Oral at bedtime  trimethoprim  160 mG/sulfamethoxazole 800 mG 1 Tablet(s) Oral daily      ROS:   General:  No  fevers, chills, night sweats, fatigue  Eyes:  Good vision, no reported pain  ENT:  No sore throat, pain, runny nose  CV:  No pain, palpitations  Pulm:  No dyspnea, cough  GI:  See HPI, otherwise negative  :  No  incontinence, nocturia  Muscle:  No pain, weakness  Neuro:  No memory problems  Psych:  No insomnia, mood problems, depression  Endocrine:  No polyuria, polydipsia, cold/heat intolerance  Heme:  No petechiae, ecchymosis, easy bruisability  Skin:  No rash    PHYSICAL EXAM:   Vital Signs:  Vital Signs Last 24 Hrs  T(C): 36.7 (26 Jun 2020 07:00), Max: 36.7 (26 Jun 2020 03:00)  T(F): 98.1 (26 Jun 2020 07:00), Max: 98.1 (26 Jun 2020 03:00)  HR: 95 (26 Jun 2020 09:00) (85 - 95)  BP: 119/66 (26 Jun 2020 09:00) (92/63 - 144/67)  BP(mean): 88 (26 Jun 2020 09:00) (74 - 96)  RR: 24 (26 Jun 2020 09:00) (7 - 30)  SpO2: 98% (26 Jun 2020 09:00) (93% - 99%)  Daily     Daily     GENERAL: no acute distress  NEURO: alert, oriented x 2 no asterixis  HEENT: anicteric sclera, no conjunctival pallor appreciated  CHEST: no respiratory distress, no accessory muscle use  CARDIAC: regular rate, rhythm  ABDOMEN: soft, non-tender, non-distended, no rebound or guarding  EXTREMITIES: warm, well perfused, no edema, heel ulcer w/o purulent drainage  SKIN: no lesions noted    LABS: reviewed                        7.8    6.81  )-----------( 30       ( 26 Jun 2020 08:54 )             23.3     06-25    131<L>  |  103  |  51<H>  ----------------------------<  164<H>  5.2   |  22  |  1.16    Ca    10.7<H>      25 Jun 2020 23:35  Phos  2.6     06-25  Mg     2.0     06-25    TPro  5.5<L>  /  Alb  3.7  /  TBili  17.6<H>  /  DBili  3.1<H>  /  AST  24  /  ALT  15  /  AlkPhos  98  06-25    LIVER FUNCTIONS - ( 25 Jun 2020 23:35 )  Alb: 3.7 g/dL / Pro: 5.5 g/dL / ALK PHOS: 98 U/L / ALT: 15 U/L / AST: 24 U/L / GGT: x             Interval Diagnostic Studies: see sunrise for full report

## 2020-06-26 NOTE — PROGRESS NOTE ADULT - ASSESSMENT
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     CT Pelvis with no evidence of abscess (but noncontrast)  BCx 6/12 with NGTD  UCx 6/14 with VRE  Mental status improved after initiation of linezolid  s/p 7 day empiric coverage for VRE    Noted to have purulent drainage from the right heel eschar by transplant surgery  No obvious drainage at time of my assessments  Podiatry without concerns for infection on reevaluation  MRI of foot without obvious infection or OM    Hypotension on 6/21 and drop in H/H  Blood Cultures (6/21) with NGTD  UCx (6/21) with <10K organisms  EGD on 6/22 with Acute gastritis with hemorrhage and Acute duodenitis with hemorrhage.    Had leukocytosis and still some encephalopathy which could be related to hemorrhage   U/A (6/24) with 9 WBC  BCx (6/24) with NGTD     RECOMMENDATIONS    #Leukocytosis (improving), Hypotension (improving), Encephalopathy, Positive UCx, VRE Colonization  --Continue Bactrim DS PO QD for now for UTI/SBP PPx  --Continue to follow CBC with diff  --Continue to follow temperature curve  --Follow up on repeat blood cultures    #MISA, Prophylactic Antibiotic  --Continue Bactrim DS PO QD for now for UTI/SBP PPx (previous recurrent ESBL E coli infection)  --Appreciate Nephro and transplant hepatology recommendations    #R Heel Eschar  --Monitor off of antibiotics     #Pre-OLT Evaluation, Encounter for Vaccination,  --No absolute ID contraindications for OLT  --Patient will require Daptomycin and Meropenem for danilo-operative prophylaxis  --PCV 13 and 23 vaccination completed February 2020  --Will require HBV Vaccination - can hold off for now    I will continue to follow. Please feel free to contact me with any further questions.    Eusebio Pérez M.D.  [facility] Division of Infectious Disease  8AM-5PM: Pager Number [phone]  After Hours (or if no response): Please contact the Infectious Diseases Office at [phone]

## 2020-06-26 NOTE — PROGRESS NOTE ADULT - ASSESSMENT
64M w/ HFpEF, decompensated JEAN cirrhosis w/ hx of ascites + SBP, GI bleed 2/2 duodenal ulcers, angioectasias, and GAVE, ESBL E. Coli prostatic abscess s/p 4 weeks IV ertapenem, hx of ESBL UTIs and VRE colonization, admitted on 6/11/20 w/ jaundice and confusion, found to have MELD-increase and bacteriuria with positive leukocyte esterase. Mentation improved with Abx.    Impression:  #Cirrhosis 2/2 JEAN, decompensated by ascites; listed for liver transplant  -varices: grade II on EGD 6/22  -ascites: trace on U/S this admission  - hyponatremia: almost resolved, on NaCl 2% gtt, nephrology following  -HCC: neg on U/S this admission  -HE: a bit worse today, trace asterixis, on rifaximin. 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 MRI w/o contrast limited, but no overt signs of active infection.  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     Recommendation:  - GI bleed: transfuse PRBCs for Hb < 7.0 g/dL  - continue octreotide, continue PPI bid  - will consider eventual EGD w/ RFA of GAVE  - daily labs at 5am: CBC, CMP, INR, ammonia, fibrinogen  - remain in SICU over the weekend  - agree w/ stopping hypertonic saline  - d/c fluid restriction  - f/u stool H pylori  - c/w rifaximin and lactulose  - f/u transplant ID recs re: antibiotics (c/w Bactrim)  - continue home midodrine therapy  - f/u transplant surgery recs  - rest of care per SICU team    Ze Dominguez  Gastroenterology Fellow  MONDAY-FRIDAY 8AM-5PM:  Available via [employer] Teams  Call [phone] ([facility]) or Page [phone] ([facility] from 8am-5pm M-F  AT NIGHT AND ON WEEKENDS:  Please contact on-call GI fellow via answering service ([phone]) 64M w/ HFpEF, decompensated JEAN cirrhosis w/ hx of ascites + SBP, GI bleed 2/2 duodenal ulcers, angioectasias, and GAVE, ESBL E. Coli prostatic abscess s/p 4 weeks IV ertapenem, hx of ESBL UTIs and VRE colonization, admitted on 6/11/20 w/ jaundice and confusion, found to have MELD-increase and bacteriuria with positive leukocyte esterase. Mentation improved with Abx.    Impression:  #Cirrhosis 2/2 JEAN, decompensated by ascites; listed for liver transplant, is in the SICU  -varices: grade II on EGD 6/22  -ascites: trace on U/S this admission  - hyponatremia: almost resolved, on NaCl 2% gtt, nephrology following  -HCC: neg on U/S this admission  -HE: a bit worse today, trace asterixis, on rifaximin. lactulose since 6/23  -MELD-Na 31 on 6/24; relisted with MELD-Na 31 on 6/26  - coagulopathy: PLT 29, INR 2.49  - MISA, creat 1.26 improving, possibly renal due to GI bleed responding to transfusions  #GI bleed with acute blood loss anemia s/p 6U PRBCs (1 on 6/17, 2 on 6/21, 1 on 6/23, 2 on 6/24) and EGD 6/22, bleeding from known GAVE  #R posterior heel wound w/ overlying eschar – no signs of infections, XR neg for gas, evaluated by podiatry and ID. MRI w/o contrast limited, but no overt signs of active infection.  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 per ID      Discussed during multidisciplinary rounds with transplant surgery and nephrology.     Recommendation:  - GI bleed: transfuse PRBCs, goal Hb 7.0 g/dL  - continue octreotide, continue PPI bid  - will consider eventual EGD w/ RFA of GAVE  - once daily labs at 5am: CBC, CMP, INR, ammonia, fibrinogen  - remain in SICU over the weekend  - stop hypertonic saline  - d/c fluid restriction  - f/u stool H pylori  - c/w rifaximin and lactulose  - f/u transplant ID recs re: antibiotics (c/w Bactrim)  - continue home midodrine therapy  - f/u transplant surgery recs  - rest of care per SICU team    Ze Dominguez  Gastroenterology Fellow  MONDAY-FRIDAY 8AM-5PM:  Available via Microsoft Teams  Call [phone] ([facility]) or Page [phone] ([facility]) from 8am-5pm M-F  AT NIGHT AND ON WEEKENDS:  Please contact on-call GI fellow via answering service ([phone])

## 2020-06-26 NOTE — PROGRESS NOTE ADULT - ATTENDING COMMENTS
Patient with chronic liver disease, cirrhosis liver, portal hypertension, fluid and electrolyte abnormalities, Anemia, suboptimal Epo response  Plan:  1. Monitor electrolytes, daily weights,  off diuretics and hypertonic saline  2. Continue Epo, has received multiple PRBC  3. If sodium drops <130 restrict hypotonic fluids, can consider furosemide    Will follow  I was present during and reviewed clinical and lab data as well as assessment and plan as documented by the house staff as noted. Please contact if any additional questions with any change in clinical condition or on availability of any additional information or reports.

## 2020-06-26 NOTE — PROGRESS NOTE ADULT - ATTENDING COMMENTS
Dr. Mace (Attending Physician)  Liver Failure on Octreotide, rifaxamin, lactulose for hepatic encephalopathy, listed for liver tx MELD 35  Anemia secondary to blood loss hemoglobin goal >7, ugib from gave, duodenitis, gastritis  Hyponatremia, mild hyperkalemia start Low k diet, dc fluid restriction, salt tabs per transplant nephrology

## 2020-06-26 NOTE — PROGRESS NOTE ADULT - SUBJECTIVE AND OBJECTIVE BOX
Follow Up:  leukocytosis, pre-OLT    Interval History: afebrile. no acute overnight events.     REVIEW OF SYSTEMS  [  ] ROS unobtainable because:    [  x] All other systems negative except as noted below    Constitutional:  [ ] fever [ ] chills  [ ] weight loss  [ ] weakness  Skin:  [ ] rash [ ] phlebitis	  Eyes: [ ] icterus [ ] pain  [ ] discharge	  ENMT: [ ] sore throat  [ ] thrush [ ] ulcers [ ] exudates  Respiratory: [ ] dyspnea [ ] hemoptysis [ ] cough [ ] sputum	  Cardiovascular:  [ ] chest pain [ ] palpitations [ ] edema	  Gastrointestinal:  [ ] nausea [ ] vomiting [ ] diarrhea [ ] constipation [ ] pain	  Genitourinary:  [ ] dysuria [ ] frequency [ ] hematuria [ ] discharge [ ] flank pain  [ ] incontinence  Musculoskeletal:  [ ] myalgias [ ] arthralgias [ ] arthritis  [ ] back pain  Neurological:  [ ] headache [ ] seizures  [ ] confusion/altered mental status    Allergies  codeine (Anaphylaxis)        ANTIMICROBIALS:  nystatin    Suspension [id number] four times a day  rifAXIMin 550 two times a day  trimethoprim  160 mG/sulfamethoxazole 800 mG 1 daily      OTHER MEDS:  MEDICATIONS  (STANDING):  epoetin barry-epbx (RETACRIT) Injectable 29133 <User Schedule>  hepatitis B Vaccine - Adult (ENGERIX-B) 20 once  insulin glargine Injectable (LANTUS) 7 at bedtime  insulin lispro (HumaLOG) corrective regimen sliding scale  three times a day before meals  insulin lispro (HumaLOG) corrective regimen sliding scale  at bedtime  insulin lispro Injectable (HumaLOG) 3 three times a day before meals  lactulose Syrup 30 every 6 hours  midodrine. 20 three times a day  octreotide  Infusion 50 <Continuous>  ondansetron Injectable 4 every 8 hours  pantoprazole  Injectable 40 every 12 hours  tamsulosin 0.4 at bedtime      Vital Signs Last 24 Hrs  T(C): 36.7 (26 Jun 2020 15:00), Max: 36.9 (26 Jun 2020 11:00)  T(F): 98.1 (26 Jun 2020 15:00), Max: 98.4 (26 Jun 2020 11:00)  HR: 84 (26 Jun 2020 18:00) (84 - 97)  BP: 132/60 (26 Jun 2020 18:00) (92/63 - 135/63)  BP(mean): 86 (26 Jun 2020 18:00) (74 - 94)  RR: 11 (26 Jun 2020 18:00) (0 - 30)  SpO2: 98% (26 Jun 2020 18:00) (92% - 99%)    PHYSICAL EXAMINATION:  General: Alert and Awake, oriented 3x but still inappropriate at times, NAD, jaundice  HEENT: PERRL, EOMI, scleral icterus  Neck: Supple  Cardiac: RRR, No M/R/G  Resp: CTAB, No Wh/Rh/Ra  Abdomen: NBS, NT/ND, No HSM, No rigidity or guarding  MSK: R heel with eschar without surrounding drainage or underlying fluctuance.  1+ LE edema. No Calf tenderness  Skin: No rashes or lesions. Skin is warm and dry to the touch.   Neuro: Alert and Awake. oriented 3x CN 2-12 Grossly intact. Moves all four extremities spontaneously.  Psych: Calm, Pleasant, Cooperative                        7.4    5.70  )-----------( 28       ( 26 Jun 2020 13:55 )             22.1       06-26    133<L>  |  104  |  53<H>  ----------------------------<  226<H>  5.4<H>   |  18<L>  |  1.16    Ca    11.1<H>      26 Jun 2020 13:55  Phos  2.5     06-26  Mg     1.9     06-26    TPro  5.6<L>  /  Alb  3.8  /  TBili  18.6<H>  /  DBili  3.4<H>  /  AST  21  /  ALT  17  /  AlkPhos  95  06-26          MICROBIOLOGY:  v  .Blood Blood  06-24-20   No growth to date.  --  --      .Urine Clean Catch (Midstream)  06-21-20   <10,000 CFU/ml of other organisms  --  --      .Blood Blood-Peripheral  06-21-20   No growth to date.  --  --      .Urine Catheterized  06-14-20   >100,000 CFU/ml Enterococcus faecium (vancomycin resistant)  --  Enterococcus faecium (vancomycin resistant)      .Urine Clean Catch (Midstream)  06-14-20   >100,000 CFU/ml Enterococcus faecium (vancomycin resistant)  --  Enterococcus faecium (vancomycin resistant)      .Blood Blood-Peripheral  06-12-20   No Growth Final  --  --        CMV IgG Antibody: <0.20 U/mL (12-04-19 @ 08:33)  Toxoplasma IgG Screen: <3.0 IU/mL (12-04-19 @ 08:33)          RADIOLOGY:    <The imaging below has been reviewed and visualized by me independently. Findings as detailed in report below>    EXAM:  US DPLX ABDOMEN                        PROCEDURE DATE:  06/24/2020    Limited study.  IVC, main portal vein, left portal vein, and splenic vein at the hilum are patent. Prominent patent superior mesenteric vein.  Nonvisualization of the right portal vein, hepatic arteries, and splenic confluence.

## 2020-06-26 NOTE — PROGRESS NOTE ADULT - ASSESSMENT
Mr. Segundo is a very pleasant 64 Year-Old Gentleman with IDDM, dyslipidemia, obesity, GERD, HFpEF with mild LV diastolic dysfunction, MGUS, and decompensated JEAN cirrhosis presenting with AMS, found to have VRE UTI     Neuro: [place] Grade I hepatic encephalopathy  - AAOx4, mild asterixis   - monitor mental status   - no Tylenol, narcotics, or benzos  - Continue lactulose, rifaximin.   - Continue to trend ammonia.     Resp: No active issues  - On room air saturating well     Cardiac: Orthostatic hypotension  - Continue with midodrine 20 TID    GI: Esophagogastroduodenoscopy 6/22 with gastritis, duodenitis with hemorrhage, Grade II esophageal varices  - Mechanical soft diet as tolerated  - c/w lactulose, rifaximin  - c/w Protonix BID  - monitor LFTs  - U/S 6/22 without evidence of ascites, pending portal vein duplex    Renal: MISA on CKD. Hyponatremia  - Continue salt tabs 1g q8h  - Monitor Cr (baseline ~1.1)  - Epogen  - Tamsulosin 0.4    Heme: Leukopenia, Anemia, Thrombocytopenia  - Received Filgrastim  - Holding DVT prophylaxis  - Trend INR    ID: Hx of VRE UTI  - Appreciate ID recs, antibiotics adjusted to DS Bactrim (from Linezolid)  - c/w rifaximin     Endo: DM  - monitor FSG   - Lantus 7u, with 3u Humalog Premeal  - ISS    Dispo: SICU full code    - Wander Aguirre PA-C Mr. Segundo is a very pleasant 64 Year-Old Gentleman with IDDM, dyslipidemia, obesity, GERD, HFpEF with mild LV diastolic dysfunction, MGUS, and decompensated JEAN cirrhosis presenting with AMS, found to have VRE UTI     Neuro: [place] Grade I hepatic encephalopathy  - AAOx4, mild asterixis   - monitor mental status   - no Tylenol, narcotics, or benzos  - Continue lactulose, rifaximin.   - Continue to trend ammonia.     Resp: No active issues  - On room air saturating well     Cardiac: Orthostatic hypotension  - Continue with midodrine 20 TID    GI: Esophagogastroduodenoscopy 6/22 with gastritis, duodenitis with hemorrhage, Grade II esophageal varices  - Mechanical soft diet as tolerated  - c/w lactulose, rifaximin  - c/w Protonix BID  - monitor LFTs  - U/S 6/22 without evidence of ascites, pending portal vein duplex    Renal: MISA on CKD. Hyponatremia  - Continue salt tabs 1g q8h  - Monitor Cr (baseline ~1.1)  - Epogen  - Tamsulosin 0.4    Heme: Leukopenia, Anemia, Thrombocytopenia  - Received Filgrastim  - Holding DVT prophylaxis  - Trend INR    ID: Hx of VRE UTI  - Appreciate ID recs, antibiotics adjusted to DS Bactrim (from Linezolid)  - c/w rifaximin     Endo: DM  - monitor FSG   - Lantus 7u, with 3u Humalog Premeal  - ISS    Dispo: SICU full code

## 2020-06-26 NOTE — PROGRESS NOTE ADULT - SUBJECTIVE AND OBJECTIVE BOX
Transplant Surgery    Present:  Patient seen with Dr. Gray, Transplant Nephrologist Dr Salvador, Nephrology fellow,  Transplant Hepatologists: Dr. Mo, DR. Rose, NP Patrick during am rounds and examined with Dr. Gray. Disciplines not in attendance will be notified of the plan.     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 UNOS listed for liver transplantation at [facility]. 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     Re-admitted with:   ·	worsening HE  ·	UTI/VRE: tx with linezolid 6/15-22, now on bactrim DS 6/22 -   ·	MISA/Hyponatremia ()   ·	UGI bleed (melena) s/p EGD (6/22) c/w hemorrhagic duodenitis/gastritis; Grade 2 EV; requiring PRBC 1u(6/17), 2u (6/21), 1u (6/23), 1u (6/24)  ·	Known h/o R foot ulcer (MRI neg for OM)    Interval Events:   - stable VSS, afebrile  - AMS: AOx 2, + asterixis, on rifaximin and lactulose   - bld cxs (6/21) with NGTD, urine cxs (6/21) with NG   - Rectal tube removed this am; no melena overnight, BM multiple overnight   - Received one u PRBC overnight, H/H 7.8/23.3 this am, S/P 2u PRBc, 1uCryo, 1u plt 6/24,  Total this week required ~7u PRBCs  - On octreotide drip       MEDICATIONS  (STANDING):  chlorhexidine 2% Cloths 1 Application(s) Topical <User Schedule>  epoetin barry-epbx (RETACRIT) Injectable 96765 Unit(s) SubCutaneous <User Schedule>  folic acid 1 milliGRAM(s) Oral daily  hepatitis B Vaccine - Adult (ENGERIX-B) 20 MICROGram(s) IntraMuscular once  insulin glargine Injectable (LANTUS) 7 Unit(s) SubCutaneous at bedtime  insulin lispro (HumaLOG) corrective regimen sliding scale   SubCutaneous three times a day before meals  insulin lispro (HumaLOG) corrective regimen sliding scale   SubCutaneous at bedtime  insulin lispro Injectable (HumaLOG) 3 Unit(s) SubCutaneous three times a day before meals  lactulose Syrup 30 Gram(s) Oral every 6 hours  midodrine. 20 milliGRAM(s) Oral three times a day  nystatin    Suspension 014960 Unit(s) Oral four times a day  nystatin Cream 1 Application(s) Topical two times a day  octreotide  Infusion 50 MICROgram(s)/Hr (10 mL/Hr) IV Continuous <Continuous>  ondansetron Injectable 4 milliGRAM(s) IV Push every 8 hours  pantoprazole  Injectable 40 milliGRAM(s) IV Push every 12 hours  rifAXIMin 550 milliGRAM(s) Oral two times a day  silver sulfADIAZINE 1% Cream 1 Application(s) Topical two times a day  tamsulosin 0.4 milliGRAM(s) Oral at bedtime  trimethoprim  160 mG/sulfamethoxazole 800 mG 1 Tablet(s) Oral daily    MEDICATIONS  (PRN):      PAST MEDICAL & SURGICAL HISTORY:  GIB (gastrointestinal bleeding)  GERD with esophagitis: Gastritis &amp; Non Bleeding Ulcers  Hepatic encephalopathy  Obesity  Fatty liver disease, nonalcoholic  Renal stones: 25 years ago  Hypertension  Neuropathy  Hypercholesteremia  Diabetes  S/P cholecystectomy      Vital Signs Last 24 Hrs  T(C): 36.7 (26 Jun 2020 07:00), Max: 36.7 (26 Jun 2020 03:00)  T(F): 98.1 (26 Jun 2020 07:00), Max: 98.1 (26 Jun 2020 03:00)  HR: 95 (26 Jun 2020 09:00) (85 - 96)  BP: 119/66 (26 Jun 2020 09:00) (92/63 - 144/67)  BP(mean): 88 (26 Jun 2020 09:00) (74 - 96)  RR: 24 (26 Jun 2020 09:00) (7 - 30)  SpO2: 98% (26 Jun 2020 09:00) (92% - 99%)    I&O's Summary    25 Jun 2020 07:01  -  26 Jun 2020 07:00  --------------------------------------------------------  IN: 1970 mL / OUT: 1453 mL / NET: 517 mL    26 Jun 2020 07:01  -  26 Jun 2020 10:59  --------------------------------------------------------  IN: 20 mL / OUT: 0 mL / NET: 20 mL                              7.8    6.81  )-----------( 30       ( 26 Jun 2020 08:54 )             23.3     06-25    131<L>  |  103  |  51<H>  ----------------------------<  164<H>  5.2   |  22  |  1.16    Ca    10.7<H>      25 Jun 2020 23:35  Phos  2.6     06-25  Mg     2.0     06-25    TPro  5.5<L>  /  Alb  3.7  /  TBili  17.6<H>  /  DBili  3.1<H>  /  AST  24  /  ALT  15  /  AlkPhos  98  06-25          Culture - Blood (collected 06-24-20 @ 14:09)  Source: .Blood Blood-Peripheral  Preliminary Report (06-25-20 @ 15:01):    No growth to date.    Culture - Blood (collected 06-24-20 @ 14:09)  Source: .Blood Blood  Preliminary Report (06-25-20 @ 15:01):    No growth to date.    Culture - Urine (collected 06-21-20 @ 21:55)  Source: .Urine Clean Catch (Midstream)  Final Report (06-22-20 @ 17:03):    <10,000 CFU/ml of other organisms    Culture - Blood (collected 06-21-20 @ 21:53)  Source: .Blood Blood-Peripheral  Preliminary Report (06-22-20 @ 22:01):    No growth to date.    Culture - Blood (collected 06-21-20 @ 21:53)  Source: .Blood Blood-Peripheral  Preliminary Report (06-22-20 @ 22:01):    No growth to date.              Review of systems  Gen: No weight changes, fatigue, fevers/chills, weakness  Skin: Jaundice, +R heel ulcer  Head/Eyes/Ears/Mouth: No headache; Normal hearing; Normal vision w/o blurriness; No sinus pain/discomfort.    Respiratory: No dyspnea, cough, wheezing, hemoptysis  CV: No chest pain, PND, orthopnea  GI: denies diarrhea, constipation, nausea, vomiting, melena, hematochezia  : No increased frequency, dysuria, hematuria, nocturia  MSK: No joint pain/swelling; no back pain; no edema  Neuro: No dizziness/lightheadedness, weakness, seizures, numbness, tingling  Heme: No easy bruising or bleeding  Endo: No heat/cold intolerance  Psych: No significant nervousness, anxiety, stress, depression  All other systems were reviewed and are negative, except as noted.      PHYSICAL EXAM:  Constitutional: Well developed / well nourished  Eyes: Anicteric, PERRLA  ENMT: nc/at  Neck: supple  Respiratory: CTA B/L  Cardiovascular: RRR  Abdominal:  soft, NT, ND  Genitourinary: Voiding spontaneously  Extremities: R heel wound w/ overlying eschar w/ no signs of infection.   Vascular: Palpable dp pulses bilaterally  Neurological: A&O x3  Skin: R heel breakdown, no rashes,  lesions, B/L buttocks incontinent related dermatitis with no broken skin  Musculoskeletal: Moving all extremities  Psychiatric: Responsive

## 2020-06-27 LAB
ALBUMIN SERPL ELPH-MCNC: 3.5 G/DL — SIGNIFICANT CHANGE UP (ref 3.3–5)
ALBUMIN SERPL ELPH-MCNC: 3.5 G/DL — SIGNIFICANT CHANGE UP (ref 3.3–5)
ALP SERPL-CCNC: 95 U/L — SIGNIFICANT CHANGE UP (ref 40–120)
ALP SERPL-CCNC: 99 U/L — SIGNIFICANT CHANGE UP (ref 40–120)
ALT FLD-CCNC: 14 U/L — SIGNIFICANT CHANGE UP (ref 10–45)
ALT FLD-CCNC: 15 U/L — SIGNIFICANT CHANGE UP (ref 10–45)
AMMONIA BLD-MCNC: 49 UMOL/L — SIGNIFICANT CHANGE UP (ref 11–55)
ANION GAP SERPL CALC-SCNC: 6 MMOL/L — SIGNIFICANT CHANGE UP (ref 5–17)
ANION GAP SERPL CALC-SCNC: 8 MMOL/L — SIGNIFICANT CHANGE UP (ref 5–17)
APTT BLD: 58.6 SEC — HIGH (ref 27.5–36.3)
AST SERPL-CCNC: 25 U/L — SIGNIFICANT CHANGE UP (ref 10–40)
AST SERPL-CCNC: 28 U/L — SIGNIFICANT CHANGE UP (ref 10–40)
BILIRUB DIRECT SERPL-MCNC: 2.8 MG/DL — HIGH (ref 0–0.2)
BILIRUB DIRECT SERPL-MCNC: 3 MG/DL — HIGH (ref 0–0.2)
BILIRUB INDIRECT FLD-MCNC: 13.1 MG/DL — HIGH (ref 0.2–1)
BILIRUB INDIRECT FLD-MCNC: 13.4 MG/DL — HIGH (ref 0.2–1)
BILIRUB SERPL-MCNC: 15.9 MG/DL — HIGH (ref 0.2–1.2)
BILIRUB SERPL-MCNC: 16.4 MG/DL — HIGH (ref 0.2–1.2)
BLD GP AB SCN SERPL QL: NEGATIVE — SIGNIFICANT CHANGE UP
BUN SERPL-MCNC: 44 MG/DL — HIGH (ref 7–23)
BUN SERPL-MCNC: 47 MG/DL — HIGH (ref 7–23)
CALCIUM SERPL-MCNC: 10.3 MG/DL — SIGNIFICANT CHANGE UP (ref 8.4–10.5)
CALCIUM SERPL-MCNC: 10.4 MG/DL — SIGNIFICANT CHANGE UP (ref 8.4–10.5)
CHLORIDE SERPL-SCNC: 103 MMOL/L — SIGNIFICANT CHANGE UP (ref 96–108)
CHLORIDE SERPL-SCNC: 103 MMOL/L — SIGNIFICANT CHANGE UP (ref 96–108)
CO2 SERPL-SCNC: 20 MMOL/L — LOW (ref 22–31)
CO2 SERPL-SCNC: 21 MMOL/L — LOW (ref 22–31)
CREAT SERPL-MCNC: 1.1 MG/DL — SIGNIFICANT CHANGE UP (ref 0.5–1.3)
CREAT SERPL-MCNC: 1.22 MG/DL — SIGNIFICANT CHANGE UP (ref 0.5–1.3)
FIBRINOGEN PPP-MCNC: 119 MG/DL — LOW (ref 350–510)
GLUCOSE BLDC GLUCOMTR-MCNC: 145 MG/DL — HIGH (ref 70–99)
GLUCOSE BLDC GLUCOMTR-MCNC: 156 MG/DL — HIGH (ref 70–99)
GLUCOSE BLDC GLUCOMTR-MCNC: 171 MG/DL — HIGH (ref 70–99)
GLUCOSE BLDC GLUCOMTR-MCNC: 177 MG/DL — HIGH (ref 70–99)
GLUCOSE SERPL-MCNC: 137 MG/DL — HIGH (ref 70–99)
GLUCOSE SERPL-MCNC: 196 MG/DL — HIGH (ref 70–99)
HCT VFR BLD CALC: 20.7 % — CRITICAL LOW (ref 39–50)
HCT VFR BLD CALC: 21 % — CRITICAL LOW (ref 39–50)
HCT VFR BLD CALC: 23.1 % — LOW (ref 39–50)
HGB BLD-MCNC: 6.9 G/DL — CRITICAL LOW (ref 13–17)
HGB BLD-MCNC: 7.1 G/DL — LOW (ref 13–17)
HGB BLD-MCNC: 7.9 G/DL — LOW (ref 13–17)
INR BLD: 2.8 RATIO — HIGH (ref 0.88–1.16)
MAGNESIUM SERPL-MCNC: 1.8 MG/DL — SIGNIFICANT CHANGE UP (ref 1.6–2.6)
MAGNESIUM SERPL-MCNC: 1.8 MG/DL — SIGNIFICANT CHANGE UP (ref 1.6–2.6)
MCHC RBC-ENTMCNC: 33 PG — SIGNIFICANT CHANGE UP (ref 27–34)
MCHC RBC-ENTMCNC: 33.3 GM/DL — SIGNIFICANT CHANGE UP (ref 32–36)
MCHC RBC-ENTMCNC: 33.5 PG — SIGNIFICANT CHANGE UP (ref 27–34)
MCHC RBC-ENTMCNC: 33.6 PG — SIGNIFICANT CHANGE UP (ref 27–34)
MCHC RBC-ENTMCNC: 33.8 GM/DL — SIGNIFICANT CHANGE UP (ref 32–36)
MCHC RBC-ENTMCNC: 34.2 GM/DL — SIGNIFICANT CHANGE UP (ref 32–36)
MCV RBC AUTO: 97.9 FL — SIGNIFICANT CHANGE UP (ref 80–100)
MCV RBC AUTO: 99 FL — SIGNIFICANT CHANGE UP (ref 80–100)
MCV RBC AUTO: 99.5 FL — SIGNIFICANT CHANGE UP (ref 80–100)
NRBC # BLD: 0 /100 WBCS — SIGNIFICANT CHANGE UP (ref 0–0)
PHOSPHATE SERPL-MCNC: 2.8 MG/DL — SIGNIFICANT CHANGE UP (ref 2.5–4.5)
PHOSPHATE SERPL-MCNC: 3 MG/DL — SIGNIFICANT CHANGE UP (ref 2.5–4.5)
PLATELET # BLD AUTO: 23 K/UL — LOW (ref 150–400)
PLATELET # BLD AUTO: 25 K/UL — LOW (ref 150–400)
PLATELET # BLD AUTO: 26 K/UL — LOW (ref 150–400)
POTASSIUM SERPL-MCNC: 5.3 MMOL/L — SIGNIFICANT CHANGE UP (ref 3.5–5.3)
POTASSIUM SERPL-MCNC: 5.4 MMOL/L — HIGH (ref 3.5–5.3)
POTASSIUM SERPL-SCNC: 5.3 MMOL/L — SIGNIFICANT CHANGE UP (ref 3.5–5.3)
POTASSIUM SERPL-SCNC: 5.4 MMOL/L — HIGH (ref 3.5–5.3)
PROT SERPL-MCNC: 5.1 G/DL — LOW (ref 6–8.3)
PROT SERPL-MCNC: 5.1 G/DL — LOW (ref 6–8.3)
PROTHROM AB SERPL-ACNC: 33.3 SEC — HIGH (ref 10–12.9)
RBC # BLD: 2.09 M/UL — LOW (ref 4.2–5.8)
RBC # BLD: 2.11 M/UL — LOW (ref 4.2–5.8)
RBC # BLD: 2.36 M/UL — LOW (ref 4.2–5.8)
RBC # FLD: 18.8 % — HIGH (ref 10.3–14.5)
RBC # FLD: 19.8 % — HIGH (ref 10.3–14.5)
RBC # FLD: 20 % — HIGH (ref 10.3–14.5)
RH IG SCN BLD-IMP: POSITIVE — SIGNIFICANT CHANGE UP
SODIUM SERPL-SCNC: 130 MMOL/L — LOW (ref 135–145)
SODIUM SERPL-SCNC: 131 MMOL/L — LOW (ref 135–145)
WBC # BLD: 3.57 K/UL — LOW (ref 3.8–10.5)
WBC # BLD: 3.61 K/UL — LOW (ref 3.8–10.5)
WBC # BLD: 3.97 K/UL — SIGNIFICANT CHANGE UP (ref 3.8–10.5)
WBC # FLD AUTO: 3.57 K/UL — LOW (ref 3.8–10.5)
WBC # FLD AUTO: 3.61 K/UL — LOW (ref 3.8–10.5)
WBC # FLD AUTO: 3.97 K/UL — SIGNIFICANT CHANGE UP (ref 3.8–10.5)

## 2020-06-27 PROCEDURE — 99233 SBSQ HOSP IP/OBS HIGH 50: CPT | Mod: GC

## 2020-06-27 PROCEDURE — 99222 1ST HOSP IP/OBS MODERATE 55: CPT

## 2020-06-27 PROCEDURE — 71045 X-RAY EXAM CHEST 1 VIEW: CPT | Mod: 26

## 2020-06-27 RX ORDER — MAGNESIUM SULFATE 500 MG/ML
2 VIAL (ML) INJECTION ONCE
Refills: 0 | Status: COMPLETED | OUTPATIENT
Start: 2020-06-27 | End: 2020-06-27

## 2020-06-27 RX ORDER — FUROSEMIDE 40 MG
40 TABLET ORAL DAILY
Refills: 0 | Status: DISCONTINUED | OUTPATIENT
Start: 2020-06-28 | End: 2020-06-29

## 2020-06-27 RX ORDER — FUROSEMIDE 40 MG
20 TABLET ORAL ONCE
Refills: 0 | Status: COMPLETED | OUTPATIENT
Start: 2020-06-27 | End: 2020-06-27

## 2020-06-27 RX ORDER — OCTREOTIDE ACETATE 200 UG/ML
100 INJECTION, SOLUTION INTRAVENOUS; SUBCUTANEOUS EVERY 8 HOURS
Refills: 0 | Status: DISCONTINUED | OUTPATIENT
Start: 2020-06-27 | End: 2020-06-30

## 2020-06-27 RX ADMIN — PANTOPRAZOLE SODIUM 40 MILLIGRAM(S): 20 TABLET, DELAYED RELEASE ORAL at 05:29

## 2020-06-27 RX ADMIN — Medication 500000 UNIT(S): at 17:08

## 2020-06-27 RX ADMIN — NYSTATIN CREAM 1 APPLICATION(S): 100000 CREAM TOPICAL at 17:09

## 2020-06-27 RX ADMIN — Medication 1 APPLICATION(S): at 05:31

## 2020-06-27 RX ADMIN — ONDANSETRON 4 MILLIGRAM(S): 8 TABLET, FILM COATED ORAL at 05:28

## 2020-06-27 RX ADMIN — Medication 500000 UNIT(S): at 23:44

## 2020-06-27 RX ADMIN — LACTULOSE 30 GRAM(S): 10 SOLUTION ORAL at 00:40

## 2020-06-27 RX ADMIN — MIDODRINE HYDROCHLORIDE 20 MILLIGRAM(S): 2.5 TABLET ORAL at 11:14

## 2020-06-27 RX ADMIN — Medication 1 MILLIGRAM(S): at 11:14

## 2020-06-27 RX ADMIN — Medication 500000 UNIT(S): at 00:40

## 2020-06-27 RX ADMIN — Medication 20 MILLIGRAM(S): at 10:54

## 2020-06-27 RX ADMIN — LACTULOSE 30 GRAM(S): 10 SOLUTION ORAL at 11:14

## 2020-06-27 RX ADMIN — Medication 500000 UNIT(S): at 11:36

## 2020-06-27 RX ADMIN — Medication 3 UNIT(S): at 15:33

## 2020-06-27 RX ADMIN — CHLORHEXIDINE GLUCONATE 1 APPLICATION(S): 213 SOLUTION TOPICAL at 05:29

## 2020-06-27 RX ADMIN — TAMSULOSIN HYDROCHLORIDE 0.4 MILLIGRAM(S): 0.4 CAPSULE ORAL at 21:47

## 2020-06-27 RX ADMIN — Medication 50 GRAM(S): at 03:42

## 2020-06-27 RX ADMIN — LACTULOSE 30 GRAM(S): 10 SOLUTION ORAL at 17:08

## 2020-06-27 RX ADMIN — Medication 1: at 08:26

## 2020-06-27 RX ADMIN — Medication 1: at 15:33

## 2020-06-27 RX ADMIN — OCTREOTIDE ACETATE 100 MICROGRAM(S): 200 INJECTION, SOLUTION INTRAVENOUS; SUBCUTANEOUS at 13:39

## 2020-06-27 RX ADMIN — MIDODRINE HYDROCHLORIDE 20 MILLIGRAM(S): 2.5 TABLET ORAL at 05:58

## 2020-06-27 RX ADMIN — Medication 3 UNIT(S): at 08:27

## 2020-06-27 RX ADMIN — PANTOPRAZOLE SODIUM 40 MILLIGRAM(S): 20 TABLET, DELAYED RELEASE ORAL at 17:09

## 2020-06-27 RX ADMIN — OCTREOTIDE ACETATE 10 MICROGRAM(S)/HR: 200 INJECTION, SOLUTION INTRAVENOUS; SUBCUTANEOUS at 00:40

## 2020-06-27 RX ADMIN — NYSTATIN CREAM 1 APPLICATION(S): 100000 CREAM TOPICAL at 05:58

## 2020-06-27 RX ADMIN — Medication 500000 UNIT(S): at 05:29

## 2020-06-27 RX ADMIN — Medication 1 APPLICATION(S): at 17:08

## 2020-06-27 RX ADMIN — ONDANSETRON 4 MILLIGRAM(S): 8 TABLET, FILM COATED ORAL at 21:47

## 2020-06-27 RX ADMIN — LACTULOSE 30 GRAM(S): 10 SOLUTION ORAL at 05:29

## 2020-06-27 RX ADMIN — OCTREOTIDE ACETATE 100 MICROGRAM(S): 200 INJECTION, SOLUTION INTRAVENOUS; SUBCUTANEOUS at 21:47

## 2020-06-27 RX ADMIN — Medication 3 UNIT(S): at 10:53

## 2020-06-27 RX ADMIN — ONDANSETRON 4 MILLIGRAM(S): 8 TABLET, FILM COATED ORAL at 13:41

## 2020-06-27 RX ADMIN — INSULIN GLARGINE 9 UNIT(S): 100 INJECTION, SOLUTION SUBCUTANEOUS at 21:52

## 2020-06-27 RX ADMIN — LACTULOSE 30 GRAM(S): 10 SOLUTION ORAL at 23:44

## 2020-06-27 RX ADMIN — MIDODRINE HYDROCHLORIDE 20 MILLIGRAM(S): 2.5 TABLET ORAL at 17:08

## 2020-06-27 RX ADMIN — Medication 1 TABLET(S): at 13:37

## 2020-06-27 NOTE — PROGRESS NOTE ADULT - SUBJECTIVE AND OBJECTIVE BOX
Interval Events:   No abdominal pain  No nausea /vomiting / diarrhea   No melena / bloody bm     Hospital Medications:  chlorhexidine 2% Cloths 1 Application(s) Topical <User Schedule>  epoetin barry-epbx (RETACRIT) Injectable 69337 Unit(s) SubCutaneous <User Schedule>  folic acid 1 milliGRAM(s) Oral daily  hepatitis B Vaccine - Adult (ENGERIX-B) 20 MICROGram(s) IntraMuscular once  insulin glargine Injectable (LANTUS) 9 Unit(s) SubCutaneous at bedtime  insulin lispro (HumaLOG) corrective regimen sliding scale   SubCutaneous three times a day before meals  insulin lispro (HumaLOG) corrective regimen sliding scale   SubCutaneous at bedtime  insulin lispro Injectable (HumaLOG) 3 Unit(s) SubCutaneous three times a day before meals  lactulose Syrup 30 Gram(s) Oral every 6 hours  midodrine. 20 milliGRAM(s) Oral three times a day  nystatin    Suspension 197249 Unit(s) Oral four times a day  nystatin Cream 1 Application(s) Topical two times a day  octreotide  Infusion 50 MICROgram(s)/Hr IV Continuous <Continuous>  ondansetron Injectable 4 milliGRAM(s) IV Push every 8 hours  pantoprazole  Injectable 40 milliGRAM(s) IV Push every 12 hours  rifAXIMin 550 milliGRAM(s) Oral two times a day  silver sulfADIAZINE 1% Cream 1 Application(s) Topical two times a day  tamsulosin 0.4 milliGRAM(s) Oral at bedtime  trimethoprim  160 mG/sulfamethoxazole 800 mG 1 Tablet(s) Oral daily        ROS:   General:  No fevers, chills or night sweats  ENT:  No sore throat or dysphagia  CV:  No pain or palpitations  Resp:  No dyspnea, cough or  wheezing  GI:  as above  Skin:  No rash or edema  Neuro: no weakness   Hematologic: no bleeding  Musculoskeletal: no muscle pain or join pain  Psych: no agitation      PHYSICAL EXAM:   Vital Signs:  Vital Signs Last 24 Hrs  T(C): 36.5 (2020 03:00), Max: 36.9 (2020 11:00)  T(F): 97.7 (2020 03:00), Max: 98.4 (2020 11:00)  HR: 82 (2020 05:00) (82 - 97)  BP: 108/56 (2020 05:00) (107/60 - 138/65)  BP(mean): 78 (2020 05:00) (78 - 94)  RR: 11 (2020 05:00) (0 - 27)  SpO2: 96% (2020 05:00) (92% - 99%)  Daily     Daily Weight in k.5 (2020 00:09)    GENERAL: no acute distress  NEURO: alert, oriented x 2 no asterixis  HEENT: icteric sclera, no conjunctival pallor appreciated  CHEST: no respiratory distress, no accessory muscle use  CARDIAC: regular rate, rhythm  ABDOMEN: soft, non-tender, non-distended, no rebound or guarding  EXTREMITIES: warm, well perfused, no edema, heel ulcer w/o purulent drainage  SKIN: jaundice     LABS:                        7.1    3.61  )-----------(        ( :46 )             21.0     Mean Cell Volume: 99.5 fl (- @ 01:46)        130<L>  |  103  |  47<H>  ----------------------------<  196<H>  5.4<H>   |  21<L>  |  1.22    Ca    10.4      46  Phos  3.0       Mg     1.8         TPro  5.1<L>  /  Alb  3.5  /  TBili  15.9<H>  /  DBili  2.8<H>  /  AST  25  /  ALT  14  /  AlkPhos  99      LIVER FUNCTIONS - (  )  Alb: 3.5 g/dL / Pro: 5.1 g/dL / ALK PHOS: 99 U/L / ALT: 14 U/L / AST: 25 U/L / GGT: x           PT/INR - ( 46 )   PT: 33.3 sec;   INR: 2.80 ratio         PTT - (  )  PTT:58.6 sec    Amylase Serum--      Lipase serum--       [id number]                          7.1    3.61  )-----------(        ( :46 )             21.0                         7.4    5.70  )-----------( 28       ( 2020 13:55 )             22.1                         7.8    6.81  )-----------( 30       ( 2020 08:54 )             23.3                         7.4    8.31  )-----------( 34       ( 2020 23:35 )             22.2                         7.7    9.87  )-----------( 37       ( 2020 12:38 )             23.3       Imaging:

## 2020-06-27 NOTE — PROGRESS NOTE ADULT - SUBJECTIVE AND OBJECTIVE BOX
SURGICAL INTENSIVE CARE UNIT DAILY PROGRESS NOTE    24 HOUR EVENTS:   - Dc fluid restrictions and NaCl tabs  - Adjusted diet, low K    HPI:  65 yo M PMHx of decompensated JEAN cirrhosis, HTn, HLD, DM, HFpEF (EF 70%) presents with reports of confusion. Saw his hepatologist yesterday was noted to be more jaundiced and more confused than normal. Per wife his was not oriented to time and place, and forgot how many grandchildren he had. He also urinated in his bedroom. He was confused for roughly 12 hours. He is now back to his baseline mental status. He denies fever, chills, dysuria, increased urinary frequency, flank pain, neck stiffness. No melena, hematochezia, hematemesis. (11 Jun 2020 13:53)    NEURO: Alert and oriented.     RESPIRATORY  RR: 12 (06-26-20 @ 23:00) (0 - 30)  SpO2: 98% (06-26-20 @ 23:00) (92% - 99%)  Wt(kg): --  Mechanical Ventilation:       CARDIOVASCULAR  HR: 82 (06-26-20 @ 23:00) (82 - 97)  BP: 125/64 (06-26-20 @ 23:00) (92/63 - 138/65)  BP(mean): 89 (06-26-20 @ 23:00) (74 - 94)  ABP: --  ABP(mean): --  Wt(kg): --  CVP(cm H2O): --        GI/NUTRITION  Diet: Consistent carbs, low K    GENITOURINARY  I&O's Detail    06-25 @ 07:01  -  06-26 @ 07:00  --------------------------------------------------------  IN:    octreotide  Infusion: 240 mL    Oral Fluid: 1100 mL    Packed Red Blood Cells: 300 mL    sodium chloride 2%: 30 mL    Solution: 250 mL    Solution: 50 mL  Total IN: 1970 mL    OUT:    Stool: 3 mL    Voided: 1450 mL  Total OUT: 1453 mL    Total NET: 517 mL      06-26 @ 07:01  -  06-27 @ 00:00  --------------------------------------------------------  IN:    octreotide  Infusion: 160 mL    Solution: 100 mL    Solution: 250 mL  Total IN: 510 mL    OUT:    Voided: 400 mL  Total OUT: 400 mL    Total NET: 110 mL          06-26    133<L>  |  104  |  53<H>  ----------------------------<  226<H>  5.4<H>   |  18<L>  |  1.16    Ca    11.1<H>      26 Jun 2020 13:55  Phos  2.5     06-26  Mg     1.9     06-26    TPro  5.6<L>  /  Alb  3.8  /  TBili  18.6<H>  /  DBili  3.4<H>  /  AST  21  /  ALT  17  /  AlkPhos  95  06-26      HEMATOLOGIC                        7.4    5.70  )-----------( 28       ( 26 Jun 2020 13:55 )             22.1     PT/INR - ( 25 Jun 2020 23:35 )   PT: 28.6 sec;   INR: 2.44 ratio         PTT - ( 25 Jun 2020 23:35 )  PTT:55.4 sec    INFECTIOUS DISEASES  RECENT CULTURES:  Specimen Source: .Blood Blood  Date/Time: 06-24 @ 14:09  Culture Results:   No growth to date.  Gram Stain: --  Organism: --      ENDOCRINE  CAPILLARY BLOOD GLUCOSE      POCT Blood Glucose.: 217 mg/dL (26 Jun 2020 22:08)  POCT Blood Glucose.: 230 mg/dL (26 Jun 2020 16:57)  POCT Blood Glucose.: 223 mg/dL (26 Jun 2020 13:11)  POCT Blood Glucose.: 168 mg/dL (26 Jun 2020 06:27)      IMAGING:

## 2020-06-27 NOTE — PROGRESS NOTE ADULT - ATTENDING COMMENTS
Hepatology Staff: Yomi Medina MD    I saw and examined the patient along with  Dr. Sorensen 06-27-20 @ 09:55.    Patient Medical Record, hospital course was reviewed and summarized as below:    Vitals: Vital Signs Last 24 Hrs  T(C): 36.6 (27 Jun 2020 07:00), Max: 36.9 (26 Jun 2020 11:00)  T(F): 97.9 (27 Jun 2020 07:00), Max: 98.4 (26 Jun 2020 11:00)  HR: 80 (27 Jun 2020 09:00) (80 - 97)  BP: 130/62 (27 Jun 2020 08:00) (107/60 - 138/65)  BP(mean): 89 (27 Jun 2020 08:00) (78 - 94)  RR: 29 (27 Jun 2020 09:00) (0 - 29)  SpO2: 95% (27 Jun 2020 09:00) (92% - 99%)    Labs:Creatinine, Serum: 1.22 mg/dL (06-27-20 @ 01:46)  Bilirubin Total, Serum: 15.9 mg/dL (06-27-20 @ 01:46)  INR: 2.80 ratio (06-27-20 @ 01:46)  Creatinine, Serum: 1.16 mg/dL (06-26-20 @ 13:55)  Bilirubin Total, Serum: 18.6 mg/dL (06-26-20 @ 13:55)    Imaging Studies:  I/O: I&O's Summary    26 Jun 2020 07:01  -  27 Jun 2020 07:00  --------------------------------------------------------  IN: 640 mL / OUT: 875 mL / NET: -235 mL    27 Jun 2020 07:01  -  27 Jun 2020 09:55  --------------------------------------------------------  IN: 280 mL / OUT: 350 mL / NET: -70 mL      Nutritional Status:   Albumin, Serum: 3.5 g/dL (06-27-20 @ 01:46)  Albumin, Serum: 3.8 g/dL (06-26-20 @ 13:55)    Last 24 hour events: No melena, brown stools since Octreotide was started.     Recommendations: Switch IV Octreotide to SC Octreotide 100 mcg tid. MELD score 32- will update on the UNOS list. Cont with supportive ICU care. Transfuse 1 unit of PRBC.       Plan discussed with Primary team.

## 2020-06-27 NOTE — PROGRESS NOTE ADULT - ASSESSMENT
64 M hx of DM, HLD, GERD, HFpEF with mild LV diastolic dysfunction, and decompensated JEAN cirrhosis c/b ascites with hx of SBP, HE, duodenal ulcer, GAVE and duodenal AVM s/p APC (last on 10/11/19), UNOS listed for liver transplantation at [facility]. 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 He is now re-admitted with hepatic encephalopathy and VRE UTI, also with MISA on CKD.  Hospital course complicated with GI bleed / melena transferred to SICU on 06/21 underwent EGD 06/22 which showed signes of gastritis, duodenitis with hemorrhage, Grade II esophageal varices.   received 2% NS for hyponatremia. - Dosed with Filgrastim for leukopenia.     impression:  # Cirrhosis 2/2 JEAN, decompensated by ascites; listed for liver transplant,   -varices: grade II on EGD 6/22  -ascites: trace on U/S this admission  - hyponatremia: stable 130  -HCC: neg on U/S this admission  -HE:  + asterixis, on rifaximin and lactulose   -MELD-Na 31 on 6/24; relisted with MELD-Na 31 on 6/26  -coagulopathy:   - MISA, resolved possibly renal due to GI bleed responding to transfusions    # GI bleed with acute blood loss anemia s/p 6U PRBCs (1 on 6/17, 2 on 6/21, 1 on 6/23, 2 on 6/24) and EGD 6/22, bleeding from known GAVE  # R posterior heel wound w/ overlying eschar – no signs of infections, XR neg for gas, evaluated by podiatry and ID. MRI w/o contrast limited, but no overt signs of active infection.  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 per ID    Recommendation:  - GI bleed: transfuse PRBCs, goal Hb 7.0 g/dL  - continue octreotide, continue PPI bid  - will consider eventual EGD w/ RFA of GAVE  - once daily labs at 5am: CBC, CMP, INR, ammonia, fibrinogen  - remain in SICU over the weekend  - f/u stool H pylori  - c/w rifaximin and lactulose  - f/u transplant ID recs re: antibiotics (c/w Bactrim)  - continue home midodrine therapy  - f/u transplant surgery recs  - rest of care per SICU team    Zana Sorensen   Gastroenterology Fellow  Pager: [phone]  Please page GI (Pager: [phone]) or call GI ([phone]) if there are any additional questions or concerns.   Please call on-call GI fellow after 5pm and before 8am, and on weekends.

## 2020-06-27 NOTE — PROGRESS NOTE ADULT - ASSESSMENT
65 y/o gentleman with IDDM, dyslipidemia, obesity, GERD, HFpEF with mild LV diastolic dysfunction, MGUS, hronic anemia with a history of duodenal ulcer as well as GAVE and duodenal AVM s/p APC (last on 10/11/19), 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     Re-admitted with worsening HE, UTI/VRE, MISA/Hyponatremia, UGI bleed.     [] VRE UTI  - urine cx from 6/14 grew VRE  - f/u urine cx from 6/21 with NG, bld cxs (6/21) NGTD  - received Linezolid 6/15-22, Now on Bactrim DS  - ID following    [] UGI bleed  - s/p EGD (6/22) c/w hemorrhagic duodenitis/gastritis, Grade 2EV  - transfused 1u PRBC for H&H of 6.9/20.7, will keep the goal for Hgb to above 7  - cont Protonix 40mg IV bid         [] JEAN Cirrhosis  - Listed for OLT with MELD 35  - MELD NA today 32  - cleared by ID   - HE: cont rifaximin/lactulose  - C/W octereotide drip  - Hepatology following  - Abd US duplex to assess portal vein: patent vessels   - Epogen 3xweek   - Lasix 20mg IV x1 and lasix 40mg QD for COLLETTE   - Daily labs CBC, CMP, ammonia, coags  - cont SICU care      Please page [phone] with any questions

## 2020-06-27 NOTE — PROGRESS NOTE ADULT - SUBJECTIVE AND OBJECTIVE BOX
Transplant Surgery    Present:  Patient seen with Dr. Gray, Transplant Nephrologist Dr Salvador, Nephrology fellow,  Transplant Hepatologists: Dr. Mo, DR. Rose, NP Patrick during am rounds and examined with Dr. Gray. Disciplines not in attendance will be notified of the plan.     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 UNOS listed for liver transplantation at [facility]. 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     Re-admitted with:   ·	worsening HE  ·	UTI/VRE: tx with linezolid 6/15-22, now on bactrim DS 6/22 -   ·	MISA/Hyponatremia ()   ·	UGI bleed (melena) s/p EGD (6/22) c/w hemorrhagic duodenitis/gastritis; Grade 2 EV; requiring PRBC 1u(6/17), 2u (6/21), 1u (6/23), 1u (6/24)  ·	Known h/o R foot ulcer (MRI neg for OM)    Interval Events:   - stable VSS, afebrile  - AMS: AOx 2, + asterixis, on rifaximin and lactulose   - bld cxs (6/21) with NGTD, urine cxs (6/21) with NG   - drop in H/H 7.1/21 this am, repeat stat send, S/P 3u PRBc, 1uCryo, 1u plt (6/24-6/26),  Total this week required ~8u PRBCs  - On octreotide drip          MEDICATIONS  (STANDING):  chlorhexidine 2% Cloths 1 Application(s) Topical <User Schedule>  epoetin barry-epbx (RETACRIT) Injectable 43771 Unit(s) SubCutaneous <User Schedule>  folic acid 1 milliGRAM(s) Oral daily  furosemide   Injectable 20 milliGRAM(s) IV Push once  hepatitis B Vaccine - Adult (ENGERIX-B) 20 MICROGram(s) IntraMuscular once  insulin glargine Injectable (LANTUS) 9 Unit(s) SubCutaneous at bedtime  insulin lispro (HumaLOG) corrective regimen sliding scale   SubCutaneous three times a day before meals  insulin lispro (HumaLOG) corrective regimen sliding scale   SubCutaneous at bedtime  insulin lispro Injectable (HumaLOG) 3 Unit(s) SubCutaneous three times a day before meals  lactulose Syrup 30 Gram(s) Oral every 6 hours  midodrine. 20 milliGRAM(s) Oral three times a day  nystatin    Suspension 770533 Unit(s) Oral four times a day  nystatin Cream 1 Application(s) Topical two times a day  octreotide  Injectable 100 MICROGram(s) SubCutaneous every 8 hours  ondansetron Injectable 4 milliGRAM(s) IV Push every 8 hours  pantoprazole  Injectable 40 milliGRAM(s) IV Push every 12 hours  rifAXIMin 550 milliGRAM(s) Oral two times a day  silver sulfADIAZINE 1% Cream 1 Application(s) Topical two times a day  tamsulosin 0.4 milliGRAM(s) Oral at bedtime  trimethoprim  160 mG/sulfamethoxazole 800 mG 1 Tablet(s) Oral daily    MEDICATIONS  (PRN):      PAST MEDICAL & SURGICAL HISTORY:  GIB (gastrointestinal bleeding)  GERD with esophagitis: Gastritis &amp; Non Bleeding Ulcers  Hepatic encephalopathy  Obesity  Fatty liver disease, nonalcoholic  Renal stones: 25 years ago  Hypertension  Neuropathy  Hypercholesteremia  Diabetes  S/P cholecystectomy      Vital Signs Last 24 Hrs  T(C): 36.6 (27 Jun 2020 07:00), Max: 36.9 (26 Jun 2020 11:00)  T(F): 97.9 (27 Jun 2020 07:00), Max: 98.4 (26 Jun 2020 11:00)  HR: 80 (27 Jun 2020 09:00) (80 - 97)  BP: 130/62 (27 Jun 2020 08:00) (107/60 - 138/65)  BP(mean): 89 (27 Jun 2020 08:00) (78 - 94)  RR: 29 (27 Jun 2020 09:00) (0 - 29)  SpO2: 95% (27 Jun 2020 09:00) (92% - 99%)    I&O's Summary    26 Jun 2020 07:01  -  27 Jun 2020 07:00  --------------------------------------------------------  IN: 640 mL / OUT: 875 mL / NET: -235 mL    27 Jun 2020 07:01  -  27 Jun 2020 10:51  --------------------------------------------------------  IN: 280 mL / OUT: 350 mL / NET: -70 mL                              6.9    3.57  )-----------( 25       ( 27 Jun 2020 07:47 )             20.7     06-27    130<L>  |  103  |  47<H>  ----------------------------<  196<H>  5.4<H>   |  21<L>  |  1.22    Ca    10.4      27 Jun 2020 01:46  Phos  3.0     06-27  Mg     1.8     06-27    TPro  5.1<L>  /  Alb  3.5  /  TBili  15.9<H>  /  DBili  2.8<H>  /  AST  25  /  ALT  14  /  AlkPhos  99  06-27          Culture - Blood (collected 06-24-20 @ 14:09)  Source: .Blood Blood-Peripheral  Preliminary Report (06-25-20 @ 15:01):    No growth to date.    Culture - Blood (collected 06-24-20 @ 14:09)  Source: .Blood Blood  Preliminary Report (06-25-20 @ 15:01):    No growth to date.    Culture - Urine (collected 06-21-20 @ 21:55)  Source: .Urine Clean Catch (Midstream)  Final Report (06-22-20 @ 17:03):    <10,000 CFU/ml of other organisms    Culture - Blood (collected 06-21-20 @ 21:53)  Source: .Blood Blood-Peripheral  Final Report (06-26-20 @ 22:00):    No Growth Final    Culture - Blood (collected 06-21-20 @ 21:53)  Source: .Blood Blood-Peripheral  Final Report (06-26-20 @ 22:00):    No Growth Final      Review of systems  Gen: No weight changes, fatigue, fevers/chills, weakness  Skin: Jaundice, +R heel ulcer  Head/Eyes/Ears/Mouth: No headache; Normal hearing; Normal vision w/o blurriness; No sinus pain/discomfort.    Respiratory: No dyspnea, cough, wheezing, hemoptysis  CV: No chest pain, PND, orthopnea  GI: denies diarrhea, constipation, nausea, vomiting, melena, hematochezia  : No increased frequency, dysuria, hematuria, nocturia  MSK: No joint pain/swelling; no back pain; no edema  Neuro: No dizziness/lightheadedness, weakness, seizures, numbness, tingling  Heme: No easy bruising or bleeding  Endo: No heat/cold intolerance  Psych: No significant nervousness, anxiety, stress, depression  All other systems were reviewed and are negative, except as noted.      PHYSICAL EXAM:  Constitutional: Well developed / well nourished  Eyes: Anicteric, PERRLA  ENMT: nc/at  Neck: supple  Respiratory: CTA B/L  Cardiovascular: RRR  Abdominal:  soft, NT, ND  Genitourinary: Voiding spontaneously  Extremities: R heel wound w/ overlying eschar w/ no signs of infection.   Vascular: Palpable dp pulses bilaterally  Neurological: A&O x3  Skin: R heel breakdown, no rashes,  lesions, B/L buttocks incontinent related dermatitis with no broken skin  Musculoskeletal: Moving all extremities+1LEE  Psychiatric: Responsive

## 2020-06-27 NOTE — PROGRESS NOTE ADULT - ATTENDING COMMENTS
Dr. Mace (Attending Physician)  hepatic encephalopathy on lactulose, rifaxamin  hyperkalemic will give lasix,   varices on octreotide will switch to sq 100q8  anemic secondary to blood loss from gave will tx 1 unit ?apc in the future, on ppi bid  [place] cirrhosis - cr and inr increasing slightly Dr. Mace (Attending Physician)  hepatic encephalopathy on lactulose, rifaxamin  hyperkalemic will give lasix,   varices on octreotide will switch to sq 100q8  anemic secondary to blood loss from gave will tx 1 unit ?apc in the future, on ppi bid  robertson cirrhosis - cr and inr increasing slightly  dm increased lantus last night for hyperglycemia

## 2020-06-27 NOTE — PROGRESS NOTE ADULT - ASSESSMENT
Mr. Segundo is a very pleasant 64 Year-Old Gentleman with IDDM, dyslipidemia, obesity, GERD, HFpEF with mild LV diastolic dysfunction, MGUS, and decompensated JEAN cirrhosis presenting with AMS, found to have VRE UTI     Neuro: [place] Grade I hepatic encephalopathy  - AAOx4, mild asterixis   - monitor mental status   - no Tylenol, narcotics, or benzos  - Continue lactulose, rifaximin.   - Continue to trend ammonia.     Resp: No active issues  - On room air saturating well     Cardiac: Orthostatic hypotension  - Continue with midodrine 20 TID    GI: Esophagogastroduodenoscopy 6/22 with gastritis, duodenitis with hemorrhage, Grade II esophageal varices  - Carb consistent diet  - c/w lactulose, rifaximin  - c/w Protonix BID  - monitor LFTs  - U/S 6/22 without evidence of ascites, duplex unable to visualize full hepatic vasc  - 6/25 MELD 30    Renal: MISA on CKD. Hyponatremia  - DC salt tabs, DC fluid restrictions  - Monitor Cr (baseline ~1.1)  - Epogen  - Tamsulosin 0.4    Heme: Leukopenia, Anemia, Thrombocytopenia  - Received Filgrastim  - Holding DVT prophylaxis  - Trend INR    ID: Hx of VRE UTI  - Appreciate ID recs, antibiotics adjusted to DS Bactrim (from Linezolid)  - c/w rifaximin     Endo: DM  - monitor FSG   - Lantus 7u, with 3u Humalog Premeal  - ISS    Dispo: SICU full code

## 2020-06-28 LAB
ALBUMIN SERPL ELPH-MCNC: 3.4 G/DL — SIGNIFICANT CHANGE UP (ref 3.3–5)
ALBUMIN SERPL ELPH-MCNC: 3.4 G/DL — SIGNIFICANT CHANGE UP (ref 3.3–5)
ALP SERPL-CCNC: 87 U/L — SIGNIFICANT CHANGE UP (ref 40–120)
ALP SERPL-CCNC: 98 U/L — SIGNIFICANT CHANGE UP (ref 40–120)
ALT FLD-CCNC: 18 U/L — SIGNIFICANT CHANGE UP (ref 10–45)
ALT FLD-CCNC: 18 U/L — SIGNIFICANT CHANGE UP (ref 10–45)
AMMONIA BLD-MCNC: 61 UMOL/L — HIGH (ref 11–55)
ANION GAP SERPL CALC-SCNC: 5 MMOL/L — SIGNIFICANT CHANGE UP (ref 5–17)
ANION GAP SERPL CALC-SCNC: 7 MMOL/L — SIGNIFICANT CHANGE UP (ref 5–17)
APTT BLD: 53.4 SEC — HIGH (ref 27.5–36.3)
AST SERPL-CCNC: 24 U/L — SIGNIFICANT CHANGE UP (ref 10–40)
AST SERPL-CCNC: 25 U/L — SIGNIFICANT CHANGE UP (ref 10–40)
BILIRUB DIRECT SERPL-MCNC: 3.2 MG/DL — HIGH (ref 0–0.2)
BILIRUB DIRECT SERPL-MCNC: 3.4 MG/DL — HIGH (ref 0–0.2)
BILIRUB INDIRECT FLD-MCNC: 13.5 MG/DL — HIGH (ref 0.2–1)
BILIRUB INDIRECT FLD-MCNC: 14 MG/DL — HIGH (ref 0.2–1)
BILIRUB SERPL-MCNC: 16.7 MG/DL — HIGH (ref 0.2–1.2)
BILIRUB SERPL-MCNC: 17.4 MG/DL — HIGH (ref 0.2–1.2)
BUN SERPL-MCNC: 45 MG/DL — HIGH (ref 7–23)
BUN SERPL-MCNC: 48 MG/DL — HIGH (ref 7–23)
CALCIUM SERPL-MCNC: 10.3 MG/DL — SIGNIFICANT CHANGE UP (ref 8.4–10.5)
CALCIUM SERPL-MCNC: 10.5 MG/DL — SIGNIFICANT CHANGE UP (ref 8.4–10.5)
CHLORIDE SERPL-SCNC: 103 MMOL/L — SIGNIFICANT CHANGE UP (ref 96–108)
CHLORIDE SERPL-SCNC: 103 MMOL/L — SIGNIFICANT CHANGE UP (ref 96–108)
CO2 SERPL-SCNC: 23 MMOL/L — SIGNIFICANT CHANGE UP (ref 22–31)
CO2 SERPL-SCNC: 24 MMOL/L — SIGNIFICANT CHANGE UP (ref 22–31)
CREAT SERPL-MCNC: 1.16 MG/DL — SIGNIFICANT CHANGE UP (ref 0.5–1.3)
CREAT SERPL-MCNC: 1.27 MG/DL — SIGNIFICANT CHANGE UP (ref 0.5–1.3)
FIBRINOGEN PPP-MCNC: 123 MG/DL — LOW (ref 350–510)
GLUCOSE BLDC GLUCOMTR-MCNC: 126 MG/DL — HIGH (ref 70–99)
GLUCOSE BLDC GLUCOMTR-MCNC: 146 MG/DL — HIGH (ref 70–99)
GLUCOSE BLDC GLUCOMTR-MCNC: 202 MG/DL — HIGH (ref 70–99)
GLUCOSE BLDC GLUCOMTR-MCNC: 241 MG/DL — HIGH (ref 70–99)
GLUCOSE SERPL-MCNC: 145 MG/DL — HIGH (ref 70–99)
GLUCOSE SERPL-MCNC: 149 MG/DL — HIGH (ref 70–99)
HCT VFR BLD CALC: 21.2 % — LOW (ref 39–50)
HCT VFR BLD CALC: 23.9 % — LOW (ref 39–50)
HGB BLD-MCNC: 7.5 G/DL — LOW (ref 13–17)
HGB BLD-MCNC: 8.2 G/DL — LOW (ref 13–17)
INR BLD: 2.42 RATIO — HIGH (ref 0.88–1.16)
MAGNESIUM SERPL-MCNC: 1.8 MG/DL — SIGNIFICANT CHANGE UP (ref 1.6–2.6)
MAGNESIUM SERPL-MCNC: 1.8 MG/DL — SIGNIFICANT CHANGE UP (ref 1.6–2.6)
MCHC RBC-ENTMCNC: 33.5 PG — SIGNIFICANT CHANGE UP (ref 27–34)
MCHC RBC-ENTMCNC: 34.3 GM/DL — SIGNIFICANT CHANGE UP (ref 32–36)
MCHC RBC-ENTMCNC: 34.4 PG — HIGH (ref 27–34)
MCHC RBC-ENTMCNC: 35.4 GM/DL — SIGNIFICANT CHANGE UP (ref 32–36)
MCV RBC AUTO: 97.2 FL — SIGNIFICANT CHANGE UP (ref 80–100)
MCV RBC AUTO: 97.6 FL — SIGNIFICANT CHANGE UP (ref 80–100)
NRBC # BLD: 0 /100 WBCS — SIGNIFICANT CHANGE UP (ref 0–0)
NRBC # BLD: 0 /100 WBCS — SIGNIFICANT CHANGE UP (ref 0–0)
PHOSPHATE SERPL-MCNC: 2.7 MG/DL — SIGNIFICANT CHANGE UP (ref 2.5–4.5)
PHOSPHATE SERPL-MCNC: 3.5 MG/DL — SIGNIFICANT CHANGE UP (ref 2.5–4.5)
PLATELET # BLD AUTO: 21 K/UL — LOW (ref 150–400)
PLATELET # BLD AUTO: 22 K/UL — LOW (ref 150–400)
POTASSIUM SERPL-MCNC: 4.8 MMOL/L — SIGNIFICANT CHANGE UP (ref 3.5–5.3)
POTASSIUM SERPL-MCNC: 5.2 MMOL/L — SIGNIFICANT CHANGE UP (ref 3.5–5.3)
POTASSIUM SERPL-SCNC: 4.8 MMOL/L — SIGNIFICANT CHANGE UP (ref 3.5–5.3)
POTASSIUM SERPL-SCNC: 5.2 MMOL/L — SIGNIFICANT CHANGE UP (ref 3.5–5.3)
PROT SERPL-MCNC: 5.3 G/DL — LOW (ref 6–8.3)
PROT SERPL-MCNC: 5.4 G/DL — LOW (ref 6–8.3)
PROTHROM AB SERPL-ACNC: 28.3 SEC — HIGH (ref 10–12.9)
RBC # BLD: 2.18 M/UL — LOW (ref 4.2–5.8)
RBC # BLD: 2.45 M/UL — LOW (ref 4.2–5.8)
RBC # FLD: 18.6 % — HIGH (ref 10.3–14.5)
RBC # FLD: 18.7 % — HIGH (ref 10.3–14.5)
SODIUM SERPL-SCNC: 132 MMOL/L — LOW (ref 135–145)
SODIUM SERPL-SCNC: 133 MMOL/L — LOW (ref 135–145)
WBC # BLD: 2.85 K/UL — LOW (ref 3.8–10.5)
WBC # BLD: 3.25 K/UL — LOW (ref 3.8–10.5)
WBC # FLD AUTO: 2.85 K/UL — LOW (ref 3.8–10.5)
WBC # FLD AUTO: 3.25 K/UL — LOW (ref 3.8–10.5)

## 2020-06-28 PROCEDURE — 99222 1ST HOSP IP/OBS MODERATE 55: CPT

## 2020-06-28 PROCEDURE — 99233 SBSQ HOSP IP/OBS HIGH 50: CPT | Mod: GC

## 2020-06-28 RX ORDER — MAGNESIUM SULFATE 500 MG/ML
2 VIAL (ML) INJECTION ONCE
Refills: 0 | Status: COMPLETED | OUTPATIENT
Start: 2020-06-28 | End: 2020-06-28

## 2020-06-28 RX ADMIN — OCTREOTIDE ACETATE 100 MICROGRAM(S): 200 INJECTION, SOLUTION INTRAVENOUS; SUBCUTANEOUS at 05:04

## 2020-06-28 RX ADMIN — ONDANSETRON 4 MILLIGRAM(S): 8 TABLET, FILM COATED ORAL at 15:12

## 2020-06-28 RX ADMIN — Medication 1 MILLIGRAM(S): at 10:53

## 2020-06-28 RX ADMIN — Medication 50 GRAM(S): at 16:17

## 2020-06-28 RX ADMIN — NYSTATIN CREAM 1 APPLICATION(S): 100000 CREAM TOPICAL at 17:17

## 2020-06-28 RX ADMIN — OCTREOTIDE ACETATE 100 MICROGRAM(S): 200 INJECTION, SOLUTION INTRAVENOUS; SUBCUTANEOUS at 15:12

## 2020-06-28 RX ADMIN — ONDANSETRON 4 MILLIGRAM(S): 8 TABLET, FILM COATED ORAL at 22:00

## 2020-06-28 RX ADMIN — Medication 62.5 MILLIMOLE(S): at 08:00

## 2020-06-28 RX ADMIN — CHLORHEXIDINE GLUCONATE 1 APPLICATION(S): 213 SOLUTION TOPICAL at 05:05

## 2020-06-28 RX ADMIN — Medication 40 MILLIGRAM(S): at 05:04

## 2020-06-28 RX ADMIN — OCTREOTIDE ACETATE 100 MICROGRAM(S): 200 INJECTION, SOLUTION INTRAVENOUS; SUBCUTANEOUS at 22:18

## 2020-06-28 RX ADMIN — Medication 500000 UNIT(S): at 05:04

## 2020-06-28 RX ADMIN — Medication 1 TABLET(S): at 10:52

## 2020-06-28 RX ADMIN — Medication 1 APPLICATION(S): at 05:05

## 2020-06-28 RX ADMIN — Medication 3 UNIT(S): at 07:35

## 2020-06-28 RX ADMIN — Medication 50 GRAM(S): at 08:01

## 2020-06-28 RX ADMIN — Medication 2: at 17:14

## 2020-06-28 RX ADMIN — Medication 50 GRAM(S): at 15:19

## 2020-06-28 RX ADMIN — Medication 3 UNIT(S): at 10:53

## 2020-06-28 RX ADMIN — PANTOPRAZOLE SODIUM 40 MILLIGRAM(S): 20 TABLET, DELAYED RELEASE ORAL at 17:17

## 2020-06-28 RX ADMIN — LACTULOSE 30 GRAM(S): 10 SOLUTION ORAL at 10:51

## 2020-06-28 RX ADMIN — Medication 500000 UNIT(S): at 17:16

## 2020-06-28 RX ADMIN — PANTOPRAZOLE SODIUM 40 MILLIGRAM(S): 20 TABLET, DELAYED RELEASE ORAL at 05:04

## 2020-06-28 RX ADMIN — MIDODRINE HYDROCHLORIDE 20 MILLIGRAM(S): 2.5 TABLET ORAL at 05:06

## 2020-06-28 RX ADMIN — MIDODRINE HYDROCHLORIDE 20 MILLIGRAM(S): 2.5 TABLET ORAL at 10:51

## 2020-06-28 RX ADMIN — Medication 500000 UNIT(S): at 10:52

## 2020-06-28 RX ADMIN — MIDODRINE HYDROCHLORIDE 20 MILLIGRAM(S): 2.5 TABLET ORAL at 17:16

## 2020-06-28 RX ADMIN — TAMSULOSIN HYDROCHLORIDE 0.4 MILLIGRAM(S): 0.4 CAPSULE ORAL at 22:00

## 2020-06-28 RX ADMIN — INSULIN GLARGINE 9 UNIT(S): 100 INJECTION, SOLUTION SUBCUTANEOUS at 22:00

## 2020-06-28 RX ADMIN — NYSTATIN CREAM 1 APPLICATION(S): 100000 CREAM TOPICAL at 05:05

## 2020-06-28 RX ADMIN — Medication 3 UNIT(S): at 17:15

## 2020-06-28 RX ADMIN — LACTULOSE 30 GRAM(S): 10 SOLUTION ORAL at 05:04

## 2020-06-28 RX ADMIN — LACTULOSE 30 GRAM(S): 10 SOLUTION ORAL at 17:16

## 2020-06-28 RX ADMIN — ONDANSETRON 4 MILLIGRAM(S): 8 TABLET, FILM COATED ORAL at 05:06

## 2020-06-28 NOTE — PROGRESS NOTE ADULT - ATTENDING COMMENTS
Hepatology Staff: Yomi Medina MD    I saw and examined the patient along with  Dr. Sorensen  06-28-20 @ 08:52.    Patient Medical Record, hosptial course was reviewed and summarized as below:    Vitals: Vital Signs Last 24 Hrs  T(C): 36.6 (28 Jun 2020 07:00), Max: 36.6 (27 Jun 2020 11:00)  T(F): 97.9 (28 Jun 2020 07:00), Max: 97.9 (27 Jun 2020 11:00)  HR: 76 (28 Jun 2020 08:00) (75 - 104)  BP: 126/58 (28 Jun 2020 08:00) (109/58 - 158/73)  BP(mean): 84 (28 Jun 2020 08:00) (65 - 113)  RR: 21 (28 Jun 2020 08:00) (10 - 36)  SpO2: 99% (28 Jun 2020 08:00) (88% - 100%)    Labs:INR: 2.42 ratio (06-28-20 @ 02:03)  Creatinine, Serum: 1.16 mg/dL (06-28-20 @ 02:03)  Bilirubin Total, Serum: 16.7 mg/dL (06-28-20 @ 02:03)  Creatinine, Serum: 1.10 mg/dL (06-27-20 @ 14:22)  Bilirubin Total, Serum: 16.4 mg/dL (06-27-20 @ 14:22)    I/O: I&O's Summary    27 Jun 2020 07:01  -  28 Jun 2020 07:00  --------------------------------------------------------  IN: 1180 mL / OUT: 2250 mL / NET: -1070 mL    28 Jun 2020 07:01  -  28 Jun 2020 08:52  --------------------------------------------------------  IN: 275 mL / OUT: 0 mL / NET: 275 mL      Nutritional Status:   Albumin, Serum: 3.4 g/dL (06-28-20 @ 02:03)  Albumin, Serum: 3.5 g/dL (06-27-20 @ 14:22)    Last 24 hour events: Stable hemodynamically. afebrile.    Recommendations: Patient listed with MELD score of 32. No further GI bleeding. Keep on Octreotide s.c. 100 mcg tid. Agree with rest of the management.      Plan discussed with Primary team.

## 2020-06-28 NOTE — PROGRESS NOTE ADULT - ASSESSMENT
63 y/o gentleman with IDDM, dyslipidemia, obesity, GERD, HFpEF with mild LV diastolic dysfunction, MGUS, hronic anemia with a history of duodenal ulcer as well as GAVE and duodenal AVM s/p APC (last on 10/11/19), 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     Re-admitted with worsening HE, UTI/VRE, MISA/Hyponatremia, UGI bleed.     [] VRE UTI  - urine cx from 6/14 grew VRE  - f/u urine cx from 6/21 with NG, bld cxs (6/21) NGTD  - received Linezolid 6/15-22, Now on Bactrim DS  - ID following    [] UGI bleed  - s/p EGD (6/22) c/w hemorrhagic duodenitis/gastritis, Grade 2EV  - S/P Total of ~8u PRBC since admission, will keep the goal for Hgb to above 7  - cont Protonix 40mg IV bid         [] JEAN Cirrhosis  - Listed for OLT with MELD 35  - MELD NA today 31  - cleared by ID   - HE: cont rifaximin/lactulose  - C/W octereotide drip  - Hepatology following  - Abd US duplex to assess portal vein: patent vessels   - Epogen 3xweek   - Lasix 40mg QD for COLLETTE   - Daily labs CBC, CMP, ammonia, coags  - cont SICU care      Please page [phone] with any questions 63 y/o gentleman with IDDM, dyslipidemia, obesity, GERD, HFpEF with mild LV diastolic dysfunction, MGUS, chronic anemia with a history of duodenal ulcer as well as GAVE and duodenal AVM s/p APC (last on 10/11/19), 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     Re-admitted with worsening HE, UTI/VRE, MISA/Hyponatremia, UGI bleed.     [] VRE UTI  - urine cx from 6/14 grew VRE  - f/u urine cx from 6/21 with NG, bld cxs (6/21) NGTD  - received Linezolid 6/15-22, Now on Bactrim DS  - ID following    [] UGI bleed  - s/p EGD (6/22) c/w hemorrhagic duodenitis/gastritis, Grade 2EV  - S/P Total of ~8u PRBC since admission, will keep the goal for Hgb to above 7  - cont Protonix 40mg IV bid         [] JEAN Cirrhosis  - Listed for OLT with MELD 32  - MELD NA today 31  - cleared by ID   - HE: cont rifaximin/lactulose  - C/W octereotide drip  - Hepatology following  - Abd US duplex to assess portal vein: patent vessels   - Epogen 3xweek   - Lasix 40mg QD for COLLETTE   - Daily labs CBC, CMP, ammonia, coags  - cont SICU care      Please page [phone] with any questions

## 2020-06-28 NOTE — PROGRESS NOTE ADULT - SUBJECTIVE AND OBJECTIVE BOX
24 HOUR EVENTS:  - switched octreotide iv to 100mg Q8h  - received 1unit of pRBC for H/H 6.9/20.7  -given 20mg of lasix for hyperkalemia, then started on 40mg lasix PO  -lantus increased to 9units     HISTORY  Patient is a 65 y/o M with pMH of decompensated JEAN cirrhosis c/b small esophageal varices (12/2019), portal hypertensive gastropathy, ascites, hx SBP, hx hepatic encephalopathy, GAVE syndrome s/p APC, hx duodenal ulcer (5/2019), HTN, HLD, DM, HFpEF (EF 70%), recent ESBL E coli prostate abscess (3/2020 s/p 4 weeks ertapenem), orthostatic hypotension ( on midodrine) who was initially admitted to medicine for hepatic encephalopathy.   Hospital course c/b MISA, VRE UTI, acute blood loss anemia, received blood transfusion, EGD done 6/22 with Grade II esophageal varices, acute gastritis with hemorrhage and acute duodenitis with hemorrhage.      Patient had worsening mental status, concerning decompensating liver cirrhosis, hepatic encephalopathy  patient then transferred to SICU for further care.       SUBJECTIVE/ROS:  [x ] A ten-point review of systems was otherwise negative except as noted.  [ ] Due to altered mental status/intubation, subjective information were not able to be obtained from the patient. History was obtained, to the extent possible, from review of the chart and collateral sources of information.      NEURO  Exam: awake, alert, oriented  Meds: ondansetron Injectable 4 milliGRAM(s) IV Push every 8 hours    [x] Adequacy of sedation and pain control has been assessed and adjusted      RESPIRATORY  RR: 14 (06-27-20 @ 23:00) (10 - 36)  SpO2: 93% (06-27-20 @ 23:00) (88% - 100%)  Exam: unlabored, clear to auscultation bilaterally    [N/A] Extubation Readiness Assessed      CARDIOVASCULAR  HR: 77 (06-27-20 @ 23:00) (76 - 104)  BP: 111/59 (06-27-20 @ 23:00) (108/56 - 158/73)  BP(mean): 82 (06-27-20 @ 23:00) (65 - 113)    Exam: regular rate and rhythm  Cardiac Rhythm: sinus  Perfusion     [x]Adequate   [ ]Inadequate  Mentation   [x]Normal       [ ]Reduced  Extremities  [x]Warm         [ ]Cool  Volume Status [ ]Hypervolemic [x]Euvolemic [ ]Hypovolemic  Meds: furosemide    Tablet 40 milliGRAM(s) Oral daily  midodrine. 20 milliGRAM(s) Oral three times a day  tamsulosin 0.4 milliGRAM(s) Oral at bedtime      GI/NUTRITION  Exam: soft, nontender, nondistended, incision C/D/I  Diet: consistent carb with low K  Meds: lactulose Syrup 30 Gram(s) Oral every 6 hours  pantoprazole  Injectable 40 milliGRAM(s) IV Push every 12 hours      GENITOURINARY  I&O's Detail    06-26 @ 07:01 - 06-27 @ 07:00  --------------------------------------------------------  IN:    octreotide  Infusion: 240 mL    Solution: 250 mL    Solution: 150 mL  Total IN: 640 mL    OUT:    Voided: 875 mL  Total OUT: 875 mL    Total NET: -235 mL      06-27 @ 07:01 - 06-28 @ 01:41  --------------------------------------------------------  IN:    octreotide  Infusion: 30 mL    Oral Fluid: 900 mL    Packed Red Blood Cells: 250 mL  Total IN: 1180 mL    OUT:    Voided: 1850 mL  Total OUT: 1850 mL    Total NET: -670 mL      06-27    131<L>  |  103  |  44<H>  ----------------------------<  137<H>  5.3   |  20<L>  |  1.10    Ca    10.3      27 Jun 2020 14:22  Phos  2.8     06-27  Mg     1.8     06-27    TPro  5.1<L>  /  Alb  3.5  /  TBili  16.4<H>  /  DBili  3.0<H>  /  AST  28  /  ALT  15  /  AlkPhos  95  06-27    [ ] Beasley catheter, indication: N/A  Meds: folic acid 1 milliGRAM(s) Oral daily        HEMATOLOGIC  Meds:   [x] VTE Prophylaxis                        7.9    3.97  )-----------( 23       ( 27 Jun 2020 14:22 )             23.1     PT/INR - ( 27 Jun 2020 01:46 )   PT: 33.3 sec;   INR: 2.80 ratio         PTT - ( 27 Jun 2020 01:46 )  PTT:58.6 sec  Transfusion     [ ] PRBC   [ ] Platelets   [ ] FFP   [ ] Cryoprecipitate      INFECTIOUS DISEASES  WBC Count: 3.97 K/uL (06-27 @ 14:22)  WBC Count: 3.57 K/uL (06-27 @ 07:47)  WBC Count: 3.61 K/uL (06-27 @ 01:46)    RECENT CULTURES:  Specimen Source: .Blood Blood  Date/Time: 06-24 @ 14:09  Culture Results:   No growth to date.  Gram Stain: --  Organism: --    Meds: epoetin barry-epbx (RETACRIT) Injectable 55546 Unit(s) SubCutaneous <User Schedule>  hepatitis B Vaccine - Adult (ENGERIX-B) 20 MICROGram(s) IntraMuscular once  nystatin    Suspension 215772 Unit(s) Oral four times a day  rifAXIMin 550 milliGRAM(s) Oral two times a day  trimethoprim  160 mG/sulfamethoxazole 800 mG 1 Tablet(s) Oral daily      ENDOCRINE  CAPILLARY BLOOD GLUCOSE      POCT Blood Glucose.: 177 mg/dL (27 Jun 2020 21:45)  POCT Blood Glucose.: 171 mg/dL (27 Jun 2020 15:30)  POCT Blood Glucose.: 145 mg/dL (27 Jun 2020 10:45)  POCT Blood Glucose.: 156 mg/dL (27 Jun 2020 08:23)    Meds: insulin glargine Injectable (LANTUS) 9 Unit(s) SubCutaneous at bedtime  insulin lispro (HumaLOG) corrective regimen sliding scale   SubCutaneous three times a day before meals  insulin lispro (HumaLOG) corrective regimen sliding scale   SubCutaneous at bedtime  insulin lispro Injectable (HumaLOG) 3 Unit(s) SubCutaneous three times a day before meals  octreotide  Injectable 100 MICROGram(s) SubCutaneous every 8 hours        ACCESS DEVICES:  [ x] Peripheral IV  [ ] Central Venous Line	[ ] R	[ ] L	[ ] IJ	[ ] Fem	[ ] SC	Placed:   [ ] Arterial Line		[ ] R	[ ] L	[ ] Fem	[ ] Rad	[ ] Ax	Placed:   [ ] PICC:					[ ] Mediport  [ ] Urinary Catheter, Date Placed:   [x] Necessity of urinary, arterial, and venous catheters discussed    OTHER MEDICATIONS:  chlorhexidine 2% Cloths 1 Application(s) Topical <User Schedule>  nystatin Cream 1 Application(s) Topical two times a day  silver sulfADIAZINE 1% Cream 1 Application(s) Topical two times a day

## 2020-06-28 NOTE — PROGRESS NOTE ADULT - ATTENDING COMMENTS
Dr. Mace (Attending Physician)  JEAN Cirrhosis on octreotide SQ, rifaxamin and lactulose for hepatic encephalopathy, awaiting liver tx  Hypotensive on midodrine  Hyperkalemia mild improved today on lasix 40 mg daily-600 mL, daily weight still increasing  anemia secondary to blood loss/chronic disease stools now brown no transfusions overnight

## 2020-06-28 NOTE — PROGRESS NOTE ADULT - ASSESSMENT
64 M hx of DM, HLD, GERD, HFpEF with mild LV diastolic dysfunction, and decompensated JEAN cirrhosis c/b ascites with hx of SBP, HE, duodenal ulcer, GAVE and duodenal AVM s/p APC (last on 10/11/19), UNOS listed for liver transplantation at [facility]. 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 He is now re-admitted with hepatic encephalopathy and VRE UTI, also with MISA on CKD.  Hospital course complicated with GI bleed / melena transferred to SICU on 06/21 underwent EGD 06/22 which showed signes of gastritis, duodenitis with hemorrhage, Grade II esophageal varices.   received 2% NS for hyponatremia. - Dosed with Filgrastim for leukopenia.     impression:  # Cirrhosis 2/2 JEAN, decompensated by ascites; listed for liver transplant, -MELD-Na 31 on 6/28  -varices: grade II on EGD 6/22  -ascites: trace on U/S this admission  - hyponatremia: stable 133  -HCC: neg on U/S this admission  -HE:  + asterixis, on rifaximin and lactulose     # GI bleed with acute blood loss anemia s/p 7U PRBCs (1 on 6/17, 2 on 6/21, 1 on 6/23, 2 on 6/24, 1 on 6/27) and EGD 6/22, bleeding from known GAVE  # R posterior heel wound w/ overlying eschar – no signs of infections, XR neg for gas, evaluated by podiatry and ID. MRI w/o contrast limited, but no overt signs of active infection.  # ESBL UTI hx w/ hx of prostatic abscess on IV abx  # VRE colonization -    Recommendation:  - continue to monitor h& h and transfuse only to keep Hb above 7.0 g/dL  - continue octreotide 100 mg S.Q TID ,   - continue PPI bid  - daily CBC, CMP, INR,  - f/u stool H pylori  - c/w rifaximin and lactulose  - continue on suppressive bactrim for UTIs as per ID  - continue home midodrine   - rest of care per SICU team    Zana Sorensen   Gastroenterology Fellow  Pager: [phone]  Please call GI ([phone]) if there are any additional questions or concerns.   Please call on-call GI fellow after 5pm and before 8am, and on weekends.

## 2020-06-28 NOTE — PROGRESS NOTE ADULT - SUBJECTIVE AND OBJECTIVE BOX
Interval Events:   No overt GI bleed  - switched octreotide iv to 100mg Q8h  - received 1unit of pRBC for H/H 6.9.7 responded appropriately to 8.  -given 20mg of lasix for hyperkalemia, then started on 40mg lasix PO    Hospital Medications:  chlorhexidine 2% Cloths 1 Application(s) Topical <User Schedule>  epoetin barry-epbx (RETACRIT) Injectable 70449 Unit(s) SubCutaneous <User Schedule>  folic acid 1 milliGRAM(s) Oral daily  furosemide    Tablet 40 milliGRAM(s) Oral daily  hepatitis B Vaccine - Adult (ENGERIX-B) 20 MICROGram(s) IntraMuscular once  insulin glargine Injectable (LANTUS) 9 Unit(s) SubCutaneous at bedtime  insulin lispro (HumaLOG) corrective regimen sliding scale   SubCutaneous three times a day before meals  insulin lispro (HumaLOG) corrective regimen sliding scale   SubCutaneous at bedtime  insulin lispro Injectable (HumaLOG) 3 Unit(s) SubCutaneous three times a day before meals  lactulose Syrup 30 Gram(s) Oral every 6 hours  magnesium sulfate  IVPB 2 Gram(s) IV Intermittent once  midodrine. 20 milliGRAM(s) Oral three times a day  nystatin    Suspension 060276 Unit(s) Oral four times a day  nystatin Cream 1 Application(s) Topical two times a day  octreotide  Injectable 100 MICROGram(s) SubCutaneous every 8 hours  ondansetron Injectable 4 milliGRAM(s) IV Push every 8 hours  pantoprazole  Injectable 40 milliGRAM(s) IV Push every 12 hours  rifAXIMin 550 milliGRAM(s) Oral two times a day  silver sulfADIAZINE 1% Cream 1 Application(s) Topical two times a day  sodium phosphate IVPB 15 milliMole(s) IV Intermittent once  tamsulosin 0.4 milliGRAM(s) Oral at bedtime  trimethoprim  160 mG/sulfamethoxazole 800 mG 1 Tablet(s) Oral daily        ROS:   General:  No fevers, chills or night sweats  ENT:  No sore throat or dysphagia  CV:  No pain or palpitations  Resp:  No dyspnea, cough or  wheezing  GI:  as above  Skin:  No rash or edema  Neuro: no weakness   Hematologic: no bleeding  Musculoskeletal: no muscle pain or join pain  Psych: no agitation      PHYSICAL EXAM:   Vital Signs:  Vital Signs Last 24 Hrs  T(C): 36.4 (2020 03:00), Max: 36.6 (2020 11:00)  T(F): 97.5 (2020 03:00), Max: 97.9 (2020 11:00)  HR: 75 (2020 07:00) (75 - 104)  BP: 109/58 (2020 07:00) (109/58 - 158/73)  BP(mean): 80 (2020 07:00) (65 - 113)  RR: 23 (2020 07:00) (10 - 36)  SpO2: 99% (2020 07:00) (88% - 100%)  Daily     Daily Weight in k.2 (2020 00:50)    GENERAL: no acute distress  NEURO: alert, oriented x 2 no asterixis  HEENT: icteric sclera, no conjunctival pallor appreciated  CHEST: no respiratory distress, no accessory muscle use  CARDIAC: regular rate, rhythm  ABDOMEN: soft, non-tender, non-distended, no rebound or guarding  EXTREMITIES: warm, well perfused, no edema, heel ulcer w/o purulent drainage  SKIN: jaundice     LABS:                        8.2    3.25  )-----------( 21       ( 2020 02:03 )             23.9     Mean Cell Volume: 97.6 fl (-20 @ 02:03)        132<L>  |  103  |  48<H>  ----------------------------<  145<H>  5.2   |  24  |  1.16    Ca    10.5      2020 02:03  Phos  2.7       Mg     1.8         TPro  5.4<L>  /  Alb  3.4  /  TBili  16.7<H>  /  DBili  3.2<H>  /  AST  25  /  ALT  18  /  AlkPhos  98      LIVER FUNCTIONS - ( 2020 02:03 )  Alb: 3.4 g/dL / Pro: 5.4 g/dL / ALK PHOS: 98 U/L / ALT: 18 U/L / AST: 25 U/L / GGT: x           PT/INR - ( 2020 02:03 )   PT: 28.3 sec;   INR: 2.42 ratio         PTT - ( 2020 02:03 )  PTT:53.4 sec    Amylase Serum--      Lipase serum--       [id number]                          8.2    3.25  )-----------(        ( 2020 02:03 )             23.9                         7.9    3.97  )-----------(        ( 2020 14:22 )             23.1                         6.9    3.57  )-----------(        ( 2020 07:47 )             20.7                         7.1    3.61  )-----------(        ( 2020 01:46 )             21.0                         7.4    5.70  )-----------(        ( 2020 13:55 )             22.1       Imaging:

## 2020-06-28 NOTE — PROGRESS NOTE ADULT - ASSESSMENT
Patient is a 63 y/o M with pMH of decompensated JEAN cirrhosis c/b small esophageal varices (12/2019), portal hypertensive gastropathy, ascites, hx SBP, hx hepatic encephalopathy, GAVE syndrome s/p APC, hx duodenal ulcer (5/2019), HTN, HLD, DM, HFpEF (EF 70%), recent ESBL E coli prostate abscess (3/2020 s/p 4 weeks ertapenem), orthostatic hypotension ( on midodrine) c/b MISA hyponatremia VRE UTI, acute blood loss anemia, received blood transfusion,  decompensated JEAN cirrhosis, hepatic encephalopathy,     Neuro: hepatic encephalopathy, improving  - A&Ox3, mild asterixis  - monitor mental status  - avoid tylenol, narcoticx and benzos    Resp: no active issues  -incentive spirometer    CV: hx of orthostatic hypotension  -on midodrine 20mg Q8h    GI: decompensated JEAN cirrhosis on liver transplant list  -MELD score 34(6/24)  - trend LFT, ammonia  - continue lactulose with rifaximin  -EGD:6/22 with gastritis, duodenitis with hemorrhage, Grade II esophageal varices  -protonix bid  - octreotide gtt switched to 100mg Q8h  - Portal vein duplex: patent ivc and portal vein      : acute on chronic renal failure, with hyponatremia, mild hyperkalemia hx of BPH   - was on 2% Na now off, slowly improving   - renal function slowly improving, creatnin 1.1  - tamsulosin for BPH     Heme: acute blood loss anemia, elevated INR, thrombocytopenia  - received 1 unit of pRBC 6/27 for H.H 6.9/20.7  - trend cbc, transfuse as needed  - elevated INR, TEG done 6/24: received FFP and cryo   - Hold AC, on SCD's, monitor coags daily  - on Epogen    ID: VRE UTI  -s/p linezolid, now on bactrim po  -s/p meropenem  -continue rifaximin for liver cirrhosis    Endo: Hx of DM, Hyperglycemia  -Hgba1C 5.8 ( 6/14  - increased lantus to 9units and ISS AC/HS  - hgba1c

## 2020-06-28 NOTE — PROGRESS NOTE ADULT - SUBJECTIVE AND OBJECTIVE BOX
Transplant Surgery    Present:  Patient seen with Dr. Gray, NP Patrick during am rounds and examined with Dr. Gray. Disciplines not in attendance will be notified of the plan.     HPI:  63 y/o gentleman with IDDM, dyslipidemia, obesity, GERD, HFpEF with mild LV diastolic dysfunction, MGUS, hronic anemia with a history of duodenal ulcer as well as GAVE and duodenal AVM s/p APC (last on 10/11/19), 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 UNOS listed for liver transplantation at [facility]. 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     Re-admitted with:   ·	worsening HE  ·	UTI/VRE: tx with linezolid 6/15-22, now on bactrim DS 6/22 -   ·	MISA/Hyponatremia ()   ·	UGI bleed (melena) s/p EGD (6/22) c/w hemorrhagic duodenitis/gastritis; Grade 2 EV; requiring PRBC 1u(6/17), 2u (6/21), 1u (6/23), 1u (6/24)  ·	Known h/o R foot ulcer (MRI neg for OM)    Interval Events:   - stable VSS, afebrile  - AMS: AOx 2, + asterixis, on rifaximin and lactulose   - bld cxs (6/21) with NGTD, urine cxs (6/21) with NG   - Received on u PRBC for drop in H/H 7.1/21 now H&H 8.2/23.9  - Octreotide drip changed to SQ        MEDICATIONS  (STANDING):  chlorhexidine 2% Cloths 1 Application(s) Topical <User Schedule>  epoetin barry-epbx (RETACRIT) Injectable 72345 Unit(s) SubCutaneous <User Schedule>  folic acid 1 milliGRAM(s) Oral daily  furosemide    Tablet 40 milliGRAM(s) Oral daily  hepatitis B Vaccine - Adult (ENGERIX-B) 20 MICROGram(s) IntraMuscular once  insulin glargine Injectable (LANTUS) 9 Unit(s) SubCutaneous at bedtime  insulin lispro (HumaLOG) corrective regimen sliding scale   SubCutaneous three times a day before meals  insulin lispro (HumaLOG) corrective regimen sliding scale   SubCutaneous at bedtime  insulin lispro Injectable (HumaLOG) 3 Unit(s) SubCutaneous three times a day before meals  lactulose Syrup 30 Gram(s) Oral every 6 hours  midodrine. 20 milliGRAM(s) Oral three times a day  nystatin    Suspension 133874 Unit(s) Oral four times a day  nystatin Cream 1 Application(s) Topical two times a day  octreotide  Injectable 100 MICROGram(s) SubCutaneous every 8 hours  ondansetron Injectable 4 milliGRAM(s) IV Push every 8 hours  pantoprazole  Injectable 40 milliGRAM(s) IV Push every 12 hours  rifAXIMin 550 milliGRAM(s) Oral two times a day  tamsulosin 0.4 milliGRAM(s) Oral at bedtime  trimethoprim  160 mG/sulfamethoxazole 800 mG 1 Tablet(s) Oral daily    MEDICATIONS  (PRN):      PAST MEDICAL & SURGICAL HISTORY:  GIB (gastrointestinal bleeding)  GERD with esophagitis: Gastritis &amp; Non Bleeding Ulcers  Hepatic encephalopathy  Obesity  Fatty liver disease, nonalcoholic  Renal stones: 25 years ago  Hypertension  Neuropathy  Hypercholesteremia  Diabetes  S/P cholecystectomy      Vital Signs Last 24 Hrs  T(C): 36.6 (28 Jun 2020 11:00), Max: 36.6 (27 Jun 2020 15:00)  T(F): 97.9 (28 Jun 2020 11:00), Max: 97.9 (27 Jun 2020 15:00)  HR: 79 (28 Jun 2020 10:00) (75 - 104)  BP: 124/74 (28 Jun 2020 10:00) (108/56 - 158/73)  BP(mean): 92 (28 Jun 2020 10:00) (77 - 113)  RR: 19 (28 Jun 2020 10:00) (10 - 36)  SpO2: 97% (28 Jun 2020 10:00) (88% - 100%)    I&O's Summary    27 Jun 2020 07:01  -  28 Jun 2020 07:00  --------------------------------------------------------  IN: 1180 mL / OUT: 2250 mL / NET: -1070 mL    28 Jun 2020 07:01  -  28 Jun 2020 11:30  --------------------------------------------------------  IN: 900 mL / OUT: 400 mL / NET: 500 mL                              8.2    3.25  )-----------( 21       ( 28 Jun 2020 02:03 )             23.9     06-28    132<L>  |  103  |  48<H>  ----------------------------<  145<H>  5.2   |  24  |  1.16    Ca    10.5      28 Jun 2020 02:03  Phos  2.7     06-28  Mg     1.8     06-28    TPro  5.4<L>  /  Alb  3.4  /  TBili  16.7<H>  /  DBili  3.2<H>  /  AST  25  /  ALT  18  /  AlkPhos  98  06-28    Culture - Blood (collected 06-24-20 @ 14:09)  Source: .Blood Blood-Peripheral  Preliminary Report (06-25-20 @ 15:01):    No growth to date.    Culture - Blood (collected 06-24-20 @ 14:09)  Source: .Blood Blood  Preliminary Report (06-25-20 @ 15:01):    No growth to date.    Culture - Urine (collected 06-21-20 @ 21:55)  Source: .Urine Clean Catch (Midstream)  Final Report (06-22-20 @ 17:03):    <10,000 CFU/ml of other organisms    Culture - Blood (collected 06-21-20 @ 21:53)  Source: .Blood Blood-Peripheral  Final Report (06-26-20 @ 22:00):    No Growth Final    Culture - Blood (collected 06-21-20 @ 21:53)  Source: .Blood Blood-Peripheral  Final Report (06-26-20 @ 22:00):    No Growth Final    Review of systems  Gen: No weight changes, fatigue, fevers/chills, weakness  Skin: Jaundice, +R heel ulcer  Head/Eyes/Ears/Mouth: No headache; Normal hearing; Normal vision w/o blurriness; No sinus pain/discomfort.    Respiratory: No dyspnea, cough, wheezing, hemoptysis  CV: No chest pain, PND, orthopnea  GI: denies diarrhea, constipation, nausea, vomiting, melena, hematochezia  : No increased frequency, dysuria, hematuria, nocturia  MSK: No joint pain/swelling; no back pain; no edema  Neuro: No dizziness/lightheadedness, weakness, seizures, numbness, tingling  Heme: No easy bruising or bleeding  Endo: No heat/cold intolerance  Psych: No significant nervousness, anxiety, stress, depression  All other systems were reviewed and are negative, except as noted.      PHYSICAL EXAM:  Constitutional: Well developed / well nourished  Eyes: +Icteric, PERRLA  ENMT: nc/at  Neck: supple  Respiratory: CTA B/L  Cardiovascular: RRR  Abdominal:  soft, NT, ND  Genitourinary: Voiding spontaneously  Extremities: R heel wound w/ overlying eschar w/ no signs of infection.   Vascular: Palpable dp pulses bilaterally  Neurological: A&O with periods of confusion, + astrexis  Skin: R heel breakdown, no rashes,  lesions, B/L buttocks incontinent related dermatitis with no broken skin  Musculoskeletal: Moving all extremities+1LEE  Psychiatric: Responsive

## 2020-06-29 LAB
ALBUMIN SERPL ELPH-MCNC: 3.1 G/DL — LOW (ref 3.3–5)
ALBUMIN SERPL ELPH-MCNC: 3.2 G/DL — LOW (ref 3.3–5)
ALP SERPL-CCNC: 111 U/L — SIGNIFICANT CHANGE UP (ref 40–120)
ALP SERPL-CCNC: 125 U/L — HIGH (ref 40–120)
ALT FLD-CCNC: 14 U/L — SIGNIFICANT CHANGE UP (ref 10–45)
ALT FLD-CCNC: 17 U/L — SIGNIFICANT CHANGE UP (ref 10–45)
AMMONIA BLD-MCNC: 56 UMOL/L — HIGH (ref 11–55)
AMMONIA BLD-MCNC: 76 UMOL/L — HIGH (ref 11–55)
ANION GAP SERPL CALC-SCNC: 2 MMOL/L — LOW (ref 5–17)
ANION GAP SERPL CALC-SCNC: 8 MMOL/L — SIGNIFICANT CHANGE UP (ref 5–17)
APTT BLD: 55.4 SEC — HIGH (ref 27.5–35.5)
APTT BLD: 59.5 SEC — HIGH (ref 27.5–36.3)
AST SERPL-CCNC: 21 U/L — SIGNIFICANT CHANGE UP (ref 10–40)
AST SERPL-CCNC: 23 U/L — SIGNIFICANT CHANGE UP (ref 10–40)
BILIRUB DIRECT SERPL-MCNC: 2.7 MG/DL — HIGH (ref 0–0.2)
BILIRUB DIRECT SERPL-MCNC: 3 MG/DL — HIGH (ref 0–0.2)
BILIRUB INDIRECT FLD-MCNC: 11.1 MG/DL — HIGH (ref 0.2–1)
BILIRUB INDIRECT FLD-MCNC: 9.7 MG/DL — HIGH (ref 0.2–1)
BILIRUB SERPL-MCNC: 12.4 MG/DL — HIGH (ref 0.2–1.2)
BILIRUB SERPL-MCNC: 14.1 MG/DL — HIGH (ref 0.2–1.2)
BLD GP AB SCN SERPL QL: NEGATIVE — SIGNIFICANT CHANGE UP
BUN SERPL-MCNC: 44 MG/DL — HIGH (ref 7–23)
BUN SERPL-MCNC: 51 MG/DL — HIGH (ref 7–23)
CALCIUM SERPL-MCNC: 9.8 MG/DL — SIGNIFICANT CHANGE UP (ref 8.4–10.5)
CALCIUM SERPL-MCNC: 9.9 MG/DL — SIGNIFICANT CHANGE UP (ref 8.4–10.5)
CHLORIDE SERPL-SCNC: 100 MMOL/L — SIGNIFICANT CHANGE UP (ref 96–108)
CHLORIDE SERPL-SCNC: 101 MMOL/L — SIGNIFICANT CHANGE UP (ref 96–108)
CO2 SERPL-SCNC: 24 MMOL/L — SIGNIFICANT CHANGE UP (ref 22–31)
CO2 SERPL-SCNC: 27 MMOL/L — SIGNIFICANT CHANGE UP (ref 22–31)
CREAT SERPL-MCNC: 1.33 MG/DL — HIGH (ref 0.5–1.3)
CREAT SERPL-MCNC: 1.5 MG/DL — HIGH (ref 0.5–1.3)
CULTURE RESULTS: SIGNIFICANT CHANGE UP
CULTURE RESULTS: SIGNIFICANT CHANGE UP
FIBRINOGEN PPP-MCNC: 110 MG/DL — LOW (ref 350–510)
GLUCOSE BLDC GLUCOMTR-MCNC: 117 MG/DL — HIGH (ref 70–99)
GLUCOSE BLDC GLUCOMTR-MCNC: 155 MG/DL — HIGH (ref 70–99)
GLUCOSE BLDC GLUCOMTR-MCNC: 186 MG/DL — HIGH (ref 70–99)
GLUCOSE BLDC GLUCOMTR-MCNC: 216 MG/DL — HIGH (ref 70–99)
GLUCOSE BLDC GLUCOMTR-MCNC: 220 MG/DL — HIGH (ref 70–99)
GLUCOSE BLDC GLUCOMTR-MCNC: 225 MG/DL — HIGH (ref 70–99)
GLUCOSE SERPL-MCNC: 197 MG/DL — HIGH (ref 70–99)
GLUCOSE SERPL-MCNC: 207 MG/DL — HIGH (ref 70–99)
HCT VFR BLD CALC: 19.3 % — CRITICAL LOW (ref 39–50)
HCT VFR BLD CALC: 21 % — CRITICAL LOW (ref 39–50)
HGB BLD-MCNC: 6.4 G/DL — CRITICAL LOW (ref 13–17)
HGB BLD-MCNC: 7.3 G/DL — LOW (ref 13–17)
INR BLD: 2.62 RATIO — HIGH (ref 0.88–1.16)
INR BLD: 3.02 RATIO — HIGH (ref 0.88–1.16)
MAGNESIUM SERPL-MCNC: 1.9 MG/DL — SIGNIFICANT CHANGE UP (ref 1.6–2.6)
MAGNESIUM SERPL-MCNC: 2.2 MG/DL — SIGNIFICANT CHANGE UP (ref 1.6–2.6)
MCHC RBC-ENTMCNC: 32.5 PG — SIGNIFICANT CHANGE UP (ref 27–34)
MCHC RBC-ENTMCNC: 33.2 GM/DL — SIGNIFICANT CHANGE UP (ref 32–36)
MCHC RBC-ENTMCNC: 33.8 PG — SIGNIFICANT CHANGE UP (ref 27–34)
MCHC RBC-ENTMCNC: 34.8 GM/DL — SIGNIFICANT CHANGE UP (ref 32–36)
MCV RBC AUTO: 97.2 FL — SIGNIFICANT CHANGE UP (ref 80–100)
MCV RBC AUTO: 98 FL — SIGNIFICANT CHANGE UP (ref 80–100)
NRBC # BLD: 0 /100 WBCS — SIGNIFICANT CHANGE UP (ref 0–0)
NRBC # BLD: 0 /100 WBCS — SIGNIFICANT CHANGE UP (ref 0–0)
PHOSPHATE SERPL-MCNC: 3 MG/DL — SIGNIFICANT CHANGE UP (ref 2.5–4.5)
PHOSPHATE SERPL-MCNC: 3 MG/DL — SIGNIFICANT CHANGE UP (ref 2.5–4.5)
PLATELET # BLD AUTO: 15 K/UL — CRITICAL LOW (ref 150–400)
PLATELET # BLD AUTO: 17 K/UL — CRITICAL LOW (ref 150–400)
POTASSIUM SERPL-MCNC: 5.1 MMOL/L — SIGNIFICANT CHANGE UP (ref 3.5–5.3)
POTASSIUM SERPL-MCNC: 5.5 MMOL/L — HIGH (ref 3.5–5.3)
POTASSIUM SERPL-SCNC: 5.1 MMOL/L — SIGNIFICANT CHANGE UP (ref 3.5–5.3)
POTASSIUM SERPL-SCNC: 5.5 MMOL/L — HIGH (ref 3.5–5.3)
PROT SERPL-MCNC: 5 G/DL — LOW (ref 6–8.3)
PROT SERPL-MCNC: 5.1 G/DL — LOW (ref 6–8.3)
PROTHROM AB SERPL-ACNC: 31.1 SEC — HIGH (ref 10–12.9)
PROTHROM AB SERPL-ACNC: 32.7 SEC — HIGH (ref 10.6–13.6)
RBC # BLD: 1.97 M/UL — LOW (ref 4.2–5.8)
RBC # BLD: 2.16 M/UL — LOW (ref 4.2–5.8)
RBC # FLD: 17.9 % — HIGH (ref 10.3–14.5)
RBC # FLD: 18.6 % — HIGH (ref 10.3–14.5)
RH IG SCN BLD-IMP: POSITIVE — SIGNIFICANT CHANGE UP
SODIUM SERPL-SCNC: 130 MMOL/L — LOW (ref 135–145)
SODIUM SERPL-SCNC: 132 MMOL/L — LOW (ref 135–145)
SPECIMEN SOURCE: SIGNIFICANT CHANGE UP
SPECIMEN SOURCE: SIGNIFICANT CHANGE UP
WBC # BLD: 2.39 K/UL — LOW (ref 3.8–10.5)
WBC # BLD: 2.67 K/UL — LOW (ref 3.8–10.5)
WBC # FLD AUTO: 2.39 K/UL — LOW (ref 3.8–10.5)
WBC # FLD AUTO: 2.67 K/UL — LOW (ref 3.8–10.5)

## 2020-06-29 PROCEDURE — 99232 SBSQ HOSP IP/OBS MODERATE 35: CPT

## 2020-06-29 PROCEDURE — 71045 X-RAY EXAM CHEST 1 VIEW: CPT | Mod: 26

## 2020-06-29 RX ORDER — FUROSEMIDE 40 MG
40 TABLET ORAL DAILY
Refills: 0 | Status: DISCONTINUED | OUTPATIENT
Start: 2020-06-29 | End: 2020-06-29

## 2020-06-29 RX ADMIN — OCTREOTIDE ACETATE 100 MICROGRAM(S): 200 INJECTION, SOLUTION INTRAVENOUS; SUBCUTANEOUS at 23:37

## 2020-06-29 RX ADMIN — Medication 1: at 17:19

## 2020-06-29 RX ADMIN — MIDODRINE HYDROCHLORIDE 20 MILLIGRAM(S): 2.5 TABLET ORAL at 05:30

## 2020-06-29 RX ADMIN — LACTULOSE 30 GRAM(S): 10 SOLUTION ORAL at 12:15

## 2020-06-29 RX ADMIN — MIDODRINE HYDROCHLORIDE 20 MILLIGRAM(S): 2.5 TABLET ORAL at 17:18

## 2020-06-29 RX ADMIN — Medication 3 UNIT(S): at 12:16

## 2020-06-29 RX ADMIN — Medication 500000 UNIT(S): at 23:39

## 2020-06-29 RX ADMIN — Medication 40 MILLIGRAM(S): at 05:29

## 2020-06-29 RX ADMIN — LACTULOSE 30 GRAM(S): 10 SOLUTION ORAL at 00:17

## 2020-06-29 RX ADMIN — LACTULOSE 30 GRAM(S): 10 SOLUTION ORAL at 05:28

## 2020-06-29 RX ADMIN — Medication 500000 UNIT(S): at 05:28

## 2020-06-29 RX ADMIN — PANTOPRAZOLE SODIUM 40 MILLIGRAM(S): 20 TABLET, DELAYED RELEASE ORAL at 05:29

## 2020-06-29 RX ADMIN — Medication 3 UNIT(S): at 08:23

## 2020-06-29 RX ADMIN — INSULIN GLARGINE 9 UNIT(S): 100 INJECTION, SOLUTION SUBCUTANEOUS at 23:36

## 2020-06-29 RX ADMIN — ERYTHROPOIETIN 10000 UNIT(S): 10000 INJECTION, SOLUTION INTRAVENOUS; SUBCUTANEOUS at 15:25

## 2020-06-29 RX ADMIN — Medication 1 MILLIGRAM(S): at 12:16

## 2020-06-29 RX ADMIN — Medication 500000 UNIT(S): at 00:18

## 2020-06-29 RX ADMIN — Medication 40 MILLIGRAM(S): at 12:15

## 2020-06-29 RX ADMIN — PANTOPRAZOLE SODIUM 40 MILLIGRAM(S): 20 TABLET, DELAYED RELEASE ORAL at 17:18

## 2020-06-29 RX ADMIN — NYSTATIN CREAM 1 APPLICATION(S): 100000 CREAM TOPICAL at 17:18

## 2020-06-29 RX ADMIN — OCTREOTIDE ACETATE 100 MICROGRAM(S): 200 INJECTION, SOLUTION INTRAVENOUS; SUBCUTANEOUS at 17:17

## 2020-06-29 RX ADMIN — Medication 1: at 12:16

## 2020-06-29 RX ADMIN — CHLORHEXIDINE GLUCONATE 1 APPLICATION(S): 213 SOLUTION TOPICAL at 05:29

## 2020-06-29 RX ADMIN — ONDANSETRON 4 MILLIGRAM(S): 8 TABLET, FILM COATED ORAL at 23:38

## 2020-06-29 RX ADMIN — TAMSULOSIN HYDROCHLORIDE 0.4 MILLIGRAM(S): 0.4 CAPSULE ORAL at 23:39

## 2020-06-29 RX ADMIN — NYSTATIN CREAM 1 APPLICATION(S): 100000 CREAM TOPICAL at 05:29

## 2020-06-29 RX ADMIN — Medication 1 TABLET(S): at 12:16

## 2020-06-29 RX ADMIN — Medication 500000 UNIT(S): at 17:19

## 2020-06-29 RX ADMIN — OCTREOTIDE ACETATE 100 MICROGRAM(S): 200 INJECTION, SOLUTION INTRAVENOUS; SUBCUTANEOUS at 05:28

## 2020-06-29 RX ADMIN — ONDANSETRON 4 MILLIGRAM(S): 8 TABLET, FILM COATED ORAL at 05:28

## 2020-06-29 RX ADMIN — Medication 3 UNIT(S): at 17:19

## 2020-06-29 RX ADMIN — Medication 500000 UNIT(S): at 12:16

## 2020-06-29 RX ADMIN — MIDODRINE HYDROCHLORIDE 20 MILLIGRAM(S): 2.5 TABLET ORAL at 12:16

## 2020-06-29 RX ADMIN — LACTULOSE 30 GRAM(S): 10 SOLUTION ORAL at 17:18

## 2020-06-29 NOTE — PROGRESS NOTE ADULT - ASSESSMENT
encepaholapthy  anemia  advance cirrhosis  GIB     plan  hepatology input greatly appreciated   meld at 32 06/29  cont antibiotics  cont current medications  diet as tolerated  monitor cbc, transfuse to maintain hgb > 7  monitor for signs of bleeding   monitor bm on lactulose  will follow

## 2020-06-29 NOTE — PROGRESS NOTE ADULT - SUBJECTIVE AND OBJECTIVE BOX
Chief Complaint:  Patient is a 64y old  Male who presents with a chief complaint of Liver failure, hepatic encephalopathy (2020 02:46)    Reason for consult: cirrhosis, HE    Interval Events: No acute events, last transfused , pt feeling well.     Hospital Medications:  chlorhexidine 2% Cloths 1 Application(s) Topical <User Schedule>  epoetin barry-epbx (RETACRIT) Injectable 84412 Unit(s) SubCutaneous <User Schedule>  folic acid 1 milliGRAM(s) Oral daily  hepatitis B Vaccine - Adult (ENGERIX-B) 20 MICROGram(s) IntraMuscular once  insulin glargine Injectable (LANTUS) 9 Unit(s) SubCutaneous at bedtime  insulin lispro (HumaLOG) corrective regimen sliding scale   SubCutaneous three times a day before meals  insulin lispro (HumaLOG) corrective regimen sliding scale   SubCutaneous at bedtime  insulin lispro Injectable (HumaLOG) 3 Unit(s) SubCutaneous three times a day before meals  lactulose Syrup 30 Gram(s) Oral every 6 hours  midodrine. 20 milliGRAM(s) Oral three times a day  nystatin    Suspension 292601 Unit(s) Oral four times a day  nystatin Cream 1 Application(s) Topical two times a day  octreotide  Injectable 100 MICROGram(s) SubCutaneous every 8 hours  ondansetron Injectable 4 milliGRAM(s) IV Push every 8 hours  pantoprazole  Injectable 40 milliGRAM(s) IV Push every 12 hours  rifAXIMin 550 milliGRAM(s) Oral two times a day  tamsulosin 0.4 milliGRAM(s) Oral at bedtime  trimethoprim  160 mG/sulfamethoxazole 800 mG 1 Tablet(s) Oral daily      ROS:   General:  No  fevers, chills, night sweats, fatigue  Eyes:  Good vision, no reported pain  ENT:  No sore throat, pain, runny nose  CV:  No pain, palpitations  Pulm:  No dyspnea, cough  GI:  See HPI, otherwise negative  :  No  incontinence, nocturia  Muscle:  No pain, weakness  Neuro:  No memory problems  Psych:  No insomnia, mood problems, depression  Endocrine:  No polyuria, polydipsia, cold/heat intolerance  Heme:  No petechiae, ecchymosis, easy bruisability  Skin:  No rash    PHYSICAL EXAM:   Vital Signs:  Vital Signs Last 24 Hrs  T(C): 36.4 (2020 07:00), Max: 37 (2020 15:00)  T(F): 97.5 (2020 07:00), Max: 98.6 (2020 15:00)  HR: 77 (2020 10:00) (69 - 81)  BP: 105/51 (2020 10:00) (90/55 - 146/93)  BP(mean): 73 (2020 10:00) (68 - 114)  RR: 23 (2020 10:00) (9 - 27)  SpO2: 90% (2020 10:00) (90% - 100%)  Daily     Daily Weight in k (2020 01:00)    GENERAL: no acute distress  NEURO: alert, + asterixis, oriented x 3 today  HEENT: anicteric sclera, no conjunctival pallor appreciated  CHEST: no respiratory distress, no accessory muscle use  CARDIAC: regular rate, rhythm  ABDOMEN: soft, non-tender, non-distended, no rebound or guarding  EXTREMITIES: warm, well perfused, no edema, heel wound c/d/i  SKIN: no lesions noted    LABS: reviewed                        7.3    2.67  )-----------( 17       ( 2020 00:16 )             21.0     06-29    130<L>  |  101  |  44<H>  ----------------------------<  197<H>  5.1   |  27  |  1.33<H>    Ca    9.9      2020 00:16  Phos  3.0     06-29  Mg     2.2     06-29    TPro  5.0<L>  /  Alb  3.2<L>  /  TBili  14.1<H>  /  DBili  3.0<H>  /  AST  21  /  ALT  14  /  AlkPhos  111  06-29    LIVER FUNCTIONS - ( 2020 00:16 )  Alb: 3.2 g/dL / Pro: 5.0 g/dL / ALK PHOS: 111 U/L / ALT: 14 U/L / AST: 21 U/L / GGT: x             Interval Diagnostic Studies: see sunrise for full report

## 2020-06-29 NOTE — PROGRESS NOTE ADULT - ASSESSMENT
Pt with MISA likely 2/2 pre renal and/or ATN in setting hypotension, less likely HRS (Concepcion>35, no response tx)    #Acute Kidney Injury  - MISA likely 2/2 pre renal ATN in setting hypotension, infection, decrease PO intake, less likely HRS (Concepcion>35, no response tx)  - Urine Na 112 ~ ATN, UPro/Cr 0.2 gram, UA showed UTI w/o protein/rbc.    - Borderline hypotension during hospitalization (on midodrine), no response to albumin/midodrine treated, less likely HRS given Concepcion>35. Good UOP during hospitalization, lasix/aldactone held.    - Baseline sCr 1.0 in 5/2020, on admission sCr 1.57 (6/11), uptrend to 1.75 on 6/13 and has since downtrended, Scr 1.33mg/dl today  - Prior MISA w/u Dec 2019 showed FLC elevated K/L ratio 2.91 (K>L) and immunofixation showing "one Weak IgA Lambda  Band and One Weak IgG Lambda Band Identified"  - C/w holding diuretics for now. Recommend to hold further albumin for now despite urine Na<35   - Monitor serial bladder sono to r/o urinary retention as bob was removed  - Avoid nephrotoxin meds such as NSAIDS, PPI, ACEI/ARB, and contrast agents.  - Renally dose medications per GFR.    #Hyponatremia  - Na 131  - monitor at this time, no need for fluid restriction at this time  - Can d/c lasix     #Hyperkalemia  - K 5.1, mild elevated in setting of pRBC transfusion  - Would monitor at this time, can give low K diet      #Anemia  - Started ARANZA 10K units TIW  - pRBC transfusion as needed, EGD from 6/22 with GAVE, with inadequate response to pRBC  - Hgb 7.3 this am    Abelino Patterson   Nephrology Fellow  Pager NS: [phone]/ [facility]: [phone]  (After 5 pm or on weekends please page the on-call fellow)

## 2020-06-29 NOTE — PROGRESS NOTE ADULT - ATTENDING COMMENTS
Hepatology Staff: Yomi Medina MD    I saw and examined the patient along with  Dr. Dominguez  06-29-20 @ 11:55.    Patient Medical Record, hosptial course was reviewed and summarized as below:    Vitals: Vital Signs Last 24 Hrs  T(C): 36.4 (29 Jun 2020 07:00), Max: 37 (28 Jun 2020 15:00)  T(F): 97.5 (29 Jun 2020 07:00), Max: 98.6 (28 Jun 2020 15:00)  HR: 77 (29 Jun 2020 10:00) (69 - 78)  BP: 105/51 (29 Jun 2020 10:00) (90/55 - 146/93)  BP(mean): 73 (29 Jun 2020 10:00) (68 - 114)  RR: 23 (29 Jun 2020 10:00) (9 - 27)  SpO2: 90% (29 Jun 2020 10:00) (90% - 100%)    Labs:INR: 2.62 ratio (06-29-20 @ 00:16)  Creatinine, Serum: 1.33 mg/dL (06-29-20 @ 00:16)  Bilirubin Total, Serum: 14.1 mg/dL (06-29-20 @ 00:16)  Creatinine, Serum: 1.27 mg/dL (06-28-20 @ 12:12)  Bilirubin Total, Serum: 17.4 mg/dL (06-28-20 @ 12:12)    I/O: I&O's Summary    28 Jun 2020 07:01  -  29 Jun 2020 07:00  --------------------------------------------------------  IN: 2450 mL / OUT: 2501 mL / NET: -51 mL    29 Jun 2020 07:01  -  29 Jun 2020 11:55  --------------------------------------------------------  IN: 0 mL / OUT: 350 mL / NET: -350 mL      Nutritional Status:   Albumin, Serum: 3.2 g/dL (06-29-20 @ 00:16)  Albumin, Serum: 3.4 g/dL (06-28-20 @ 12:12)    Last 24 hour events: Stable clinically and hemodynamically. Afebrile. Listed at MELD Na score of 32    Recommendations: Continue with current ICU supportive care.  Cont with SQ Octreotiode 100 mcg tid. Hb appears stable ( mild drop, no over GI bleeding).      Plan discussed with Primary team.

## 2020-06-29 NOTE — PROGRESS NOTE ADULT - SUBJECTIVE AND OBJECTIVE BOX
Podiatry pager #: [phone]/ [phone]    Patient is a 64y old  Male who presents with a chief complaint of Liver failure, hepatic encephalopathy (03 Jul 2020 06:54) Podiatry seen for f/u of heel wound       INTERVAL HPI/OVERNIGHT EVENTS:  Patient seen and evaluated at bedside.  Pt is resting comfortable in NAD. Denies N/V/F/C.  Pain rated at X/10    Allergies    codeine (Anaphylaxis)    Intolerances        Vital Signs Last 24 Hrs  T(C): 37.3 (03 Jul 2020 07:00), Max: 37.5 (03 Jul 2020 04:00)  T(F): 99.1 (03 Jul 2020 07:00), Max: 99.5 (03 Jul 2020 04:00)  HR: 63 (03 Jul 2020 07:15) (54 - 67)  BP: 109/54 (03 Jul 2020 03:15) (109/54 - 164/77)  BP(mean): 78 (03 Jul 2020 03:15) (78 - 110)  RR: 16 (03 Jul 2020 07:15) (0 - 23)  SpO2: 100% (03 Jul 2020 07:15) (98% - 100%)    chlorhexidine 0.12% Liquid 15 milliLiter(s) Oral Mucosa every 12 hours  chlorhexidine 2% Cloths 1 Application(s) Topical <User Schedule>  chlorhexidine 4% Liquid 1 Application(s) Topical <User Schedule>  DAPTOmycin IVPB 400 milliGRAM(s) IV Intermittent every 24 hours  dexMEDEtomidine Infusion 0.2 MICROgram(s)/kG/Hr IV Continuous <Continuous>  dextrose 5% + sodium chloride 0.45%. 1000 milliLiter(s) IV Continuous <Continuous>  insulin regular Infusion 5 Unit(s)/Hr IV Continuous <Continuous>  labetalol Injectable 10 milliGRAM(s) IV Push every 1 hour PRN  meropenem  IVPB 1000 milliGRAM(s) IV Intermittent every 8 hours  methylPREDNISolone sodium succinate Injectable 125 milliGRAM(s) IV Push two times a day  mycophenolate mofetil Suspension 1000 milliGRAM(s) Oral <User Schedule>  niCARdipine Infusion 5 mG/Hr IV Continuous <Continuous>  nystatin    Suspension 464178 Unit(s) Swish and Swallow four times a day  nystatin Cream 1 Application(s) Topical two times a day  octreotide  Infusion 10 MICROgram(s)/Hr IV Continuous <Continuous>  pantoprazole  Injectable 40 milliGRAM(s) IV Push <User Schedule>  sodium chloride 0.9% lock flush 10 milliLiter(s) IV Push every 1 hour PRN  tacrolimus    0.5 mG/mL Suspension 4 milliGRAM(s) Oral <User Schedule>  trimethoprim  40 mG/sulfamethoxazole 200 mG Suspension 80 milliGRAM(s) Oral daily  valGANciclovir 50 mG/mL Oral Solution 450 milliGRAM(s) Oral daily      LABS:                        10.4   8.43  )-----------( 147      ( 03 Jul 2020 04:12 )             29.0     07-03    137  |  102  |  34<H>  ----------------------------<  193<H>  4.0   |  27  |  1.15    Ca    11.0<H>      03 Jul 2020 04:12  Phos  3.9     07-03  Mg     2.0     07-03    TPro  5.2<L>  /  Alb  3.1<L>  /  TBili  3.9<H>  /  DBili  1.5<H>  /  AST  753<H>  /  ALT  496<H>  /  AlkPhos  54  07-03    PT/INR - ( 03 Jul 2020 04:12 )   PT: 14.9 sec;   INR: 1.31 ratio         PTT - ( 03 Jul 2020 04:12 )  PTT:28.0 sec    CAPILLARY BLOOD GLUCOSE      POCT Blood Glucose.: 120 mg/dL (03 Jul 2020 06:49)  POCT Blood Glucose.: 131 mg/dL (03 Jul 2020 05:55)  POCT Blood Glucose.: 168 mg/dL (03 Jul 2020 04:56)  POCT Blood Glucose.: 186 mg/dL (03 Jul 2020 03:59)  POCT Blood Glucose.: 187 mg/dL (03 Jul 2020 03:00)  POCT Blood Glucose.: 218 mg/dL (03 Jul 2020 01:58)  POCT Blood Glucose.: 201 mg/dL (03 Jul 2020 01:01)  POCT Blood Glucose.: 195 mg/dL (02 Jul 2020 23:59)  POCT Blood Glucose.: 194 mg/dL (02 Jul 2020 23:01)  POCT Blood Glucose.: 195 mg/dL (02 Jul 2020 21:59)  POCT Blood Glucose.: 152 mg/dL (02 Jul 2020 21:01)  POCT Blood Glucose.: 191 mg/dL (02 Jul 2020 20:02)      Lower Extremity Physical Exam:    Vascular: DP 2/4 PT 1/4 B/L, CFT <3 seconds B/L, Temperature gradient warm to cool B/L  Neuro: Epicritic sensation diminished to the level of heel B/L  Musculoskeletal/Ortho: no gross deformities  Skin:   Wound #1: right foot  Location: posterior heel  Size: 6 x 4 cm  Depth: subQ  Wound bed: fibrogranular tissue with mild eschar  Drainage: none  Odor: none  Periwound: no clinical signs of infection  Etiology: pressure, diabetes  RADIOLOGY & ADDITIONAL TESTS:  < from: Xray Foot AP + Lateral + Oblique, Right (06.11.20 @ 14:06) >  EXAM:  FOOT COMPLETE RIGHT (MIN 3 VIEW)                            PROCEDURE DATE:  06/11/2020            INTERPRETATION:  Indication: Right foot wound.    Technique: 3 views of the right foot were obtained.    Comparison: 4/24/2020    Findings: Nofracture or dislocation is seen in the right foot. There are hammertoe deformities. No cortical irregularity or erosion is seen to suggest osteomyelitis. There is a plantar calcaneal spur. There is mild diffuse soft tissue swelling. Vascular calcifications are noted.    Impression: No radiographic evidence for osteomyelitis of the right foot.                    JAE OLIVO M.D., ATTENDING RADIOLOGIST  This document has been electronically signed. Jun 11 2020  2:13PM    < end of copied text >

## 2020-06-29 NOTE — PROGRESS NOTE ADULT - ASSESSMENT
64 M hx of DM, HLD, GERD, HFpEF with mild LV diastolic dysfunction, and decompensated JEAN cirrhosis c/b ascites with hx of SBP, HE, duodenal ulcer, GAVE and duodenal AVM s/p APC (last on 10/11/19), UNOS listed for liver transplantation at [facility]. 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 He is now re-admitted with hepatic encephalopathy and VRE UTI, also with MISA on CKD. Pt awaiting liver transplant.    Impression:  #Cirrhosis 2/2 JEAN, decompensated by ascites; listed for liver transplant, -MELD-Na 31 on 6/28  - varices: grade II on EGD 6/22  - ascites: trace on U/S this admission  - HCC: neg on U/S this admission   -HE: + asterixis, on rifaximin and lactulose   - MELD-Na = 32 on 6/29   #GI bleed with acute blood loss anemia s/p 8U PRBCs (1 on 6/17, 2 on 6/21, 1 on 6/23, 2 on 6/24, 1 on 6/26, 1 on 6/27) and EGD 6/22, bleeding from known GAVE  #R posterior heel wound w/ overlying eschar – no signs of infections, XR neg for gas, evaluated by podiatry and ID. MRI w/o contrast limited, but no overt signs of active infection.  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

## 2020-06-29 NOTE — PROGRESS NOTE ADULT - ASSESSMENT
63 y/o M PMHx decompensated JEAN Cirrhosis on transplant list, DMII, HFpEF, recent admission for ESBL E coli prostatic abscess 2/2 4 wks ertapenem, hx of ESBL UTIs, recent VRE urinary colonization came to hospital for worsening jaundice and episode of confusion, resolved PTA.     CT Pelvis with no evidence of abscess (but noncontrast)  BCx 6/12 with NGTD  UCx 6/14 with VRE  Mental status improved after initiation of linezolid  s/p 7 day empiric coverage for VRE    Noted to have purulent drainage from the right heel eschar by transplant surgery  No obvious drainage at time of my assessments  Podiatry without concerns for infection on reevaluation  MRI of foot without obvious infection or OM    Hypotension on 6/21 and drop in H/H  Blood Cultures (6/21) with NGTD  UCx (6/21) with <10K organisms  EGD on 6/22 with Acute gastritis with hemorrhage and Acute duodenitis with hemorrhage.    Had leukocytosis and encephalopathy which could have been related to hemorrhage   U/A (6/24) with 9 WBC  BCx (6/24) with NGTD     Now is leukopenic; has been leukopenic prior to this admission as well  If continued drop in his WBC count would consider switching off Bactrim as it might be contributing  Can consider changing bactrim PPx to Macrobid 100 mg PO Q24H   However, will need addition of Cipro for SBP PPx as Macrobid only accumulates in the urinary tract    RECOMMENDATIONS    #Pancytopenia, Prophylactic Antibiotic  --If continued drop in WBC count can consider changing bactrim PPx to Macrobid 100 mg PO Q24H (but will need addition of Cipro for SBP PPx as Macrobid only accumulates in the urinary tract)  --Continue to follow CBC with diff    #Pre-OLT Evaluation, Encounter for Vaccination,  --No absolute ID contraindications for OLT  --Patient will require Daptomycin and Meropenem for danilo-operative prophylaxis  --PCV 13 and 23 vaccination completed February 2020  --Will require HBV Vaccination     #Hypotension (improving), Encephalopathy (improving), Positive UCx, VRE Colonization  --s/p treatment course for VRE  --Continue to follow CBC with diff  --Continue to follow temperature curve  --Follow up on repeat blood cultures    #R Heel Eschar  --Monitor off of antibiotics     #GIB - s/p EGD with Acute gastritis and duodenitis with hemorrhage  --Management per GI  --Continue to follow CBC with diff    #MISA  --Continue to follow renal function (Cr/BUN)  --Appreciate Nephro and transplant hepatology recommendations    I will be away tomorrow. Please contact the Infectious Diseases Office to contact the covering Infectious Diseases Attending.     Eusebio Pérez M.D.  [facility] Division of Infectious Disease  8AM-5PM: Pager Number [phone]  After Hours (or if no response): Please contact the Infectious Diseases Office at [phone]     The above assessment and plan were discussed with Dr Gianluca Mace

## 2020-06-29 NOTE — PROGRESS NOTE ADULT - ASSESSMENT
63 y/o gentleman with IDDM, dyslipidemia, obesity, GERD, HFpEF with mild LV diastolic dysfunction, MGUS, chronic anemia with a history of duodenal ulcer as well as GAVE and duodenal AVM s/p APC (last on 10/11/19), 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     Re-admitted with worsening HE, UTI/VRE, MISA/Hyponatremia, UGI bleed.     [] VRE UTI  - 6/14: UCx VRE. S/P Linezolid 6/15-22. Continue PPX Bactrim  - 6/21 BCx and UCx negative to date  - Appreciate ID recommendations    [] UGI bleed  - s/p EGD (6/22) c/w hemorrhagic duodenitis/gastritis, Grade 2EV, GAVE  - Has received total 8u PRBC since admission.  Transfuse for Hgb < 7  - cont Protonix 40mg IV bid     [] JEAN Cirrhosis  - UNOS Listed for OLT with MELD 32  - MELD NA today 32  - cleared by ID   - HE: cont rifaximin/lactulose  - Continue SQ Octereotide 100q8  - Abd US duplex to assess portal vein: patent vessels   - Epogen 3xweek    DC Lasix.  Limit fluid intake to 1.5L per day  - Daily labs CBC, CMP, ammonia, coags  - Appreciate Hepatology recommendations  - cont SICU care      Please page [phone] with any questions

## 2020-06-29 NOTE — PROGRESS NOTE ADULT - SUBJECTIVE AND OBJECTIVE BOX
Transplant Surgery    Present:  Patient seen with Transplant Surgeons Dr. Gray, Dr. Chris, Hepatologist Dr.Nitzan Mo, Nephrologist Dr. DAE Patel and NP Xiao Hurley during am rounds and examined with Dr. Gray. Disciplines not in attendance will be notified of the plan.     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 UNOS listed for liver transplantation at [facility]. 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     Re-admitted with:   ·	worsening HE  ·	UTI/VRE: tx with linezolid 6/15-22, now on bactrim DS 6/22 -   ·	MISA/Hyponatremia ()   ·	UGI bleed (melena) s/p EGD (6/22) c/w hemorrhagic duodenitis/gastritis; Grade 2 EV; requiring PRBC 1u(6/17), 2u (6/21), 1u (6/23), 1u (6/24)  ·	Known h/o R foot ulcer (MRI neg for OM)    Interval Events:   - stable VSS, afebrile  - A&Ox 3, No asterixis noted this AM    MELD Na Score 32  - H/H 7.3/21.0 from 8.2/23.9.  Last PRBC on 6/27   No bloody stools noted      MEDICATIONS  (STANDING):  chlorhexidine 2% Cloths 1 Application(s) Topical <User Schedule>  epoetin barry-epbx (RETACRIT) Injectable 82393 Unit(s) SubCutaneous <User Schedule>  folic acid 1 milliGRAM(s) Oral daily  hepatitis B Vaccine - Adult (ENGERIX-B) 20 MICROGram(s) IntraMuscular once  insulin glargine Injectable (LANTUS) 9 Unit(s) SubCutaneous at bedtime  insulin lispro (HumaLOG) corrective regimen sliding scale   SubCutaneous three times a day before meals  insulin lispro (HumaLOG) corrective regimen sliding scale   SubCutaneous at bedtime  insulin lispro Injectable (HumaLOG) 3 Unit(s) SubCutaneous three times a day before meals  lactulose Syrup 30 Gram(s) Oral every 6 hours  midodrine. 20 milliGRAM(s) Oral three times a day  nystatin    Suspension 419551 Unit(s) Oral four times a day  nystatin Cream 1 Application(s) Topical two times a day  octreotide  Injectable 100 MICROGram(s) SubCutaneous every 8 hours  ondansetron Injectable 4 milliGRAM(s) IV Push every 8 hours  pantoprazole  Injectable 40 milliGRAM(s) IV Push every 12 hours  rifAXIMin 550 milliGRAM(s) Oral two times a day  tamsulosin 0.4 milliGRAM(s) Oral at bedtime  trimethoprim  160 mG/sulfamethoxazole 800 mG 1 Tablet(s) Oral daily    MEDICATIONS  (PRN):      PAST MEDICAL & SURGICAL HISTORY:  GIB (gastrointestinal bleeding)  GERD with esophagitis: Gastritis &amp; Non Bleeding Ulcers  Hepatic encephalopathy  Obesity  Fatty liver disease, nonalcoholic  Renal stones: 25 years ago  Hypertension  Neuropathy  Hypercholesteremia  Diabetes  S/P cholecystectomy      Vital Signs Last 24 Hrs  T(C): 36.4 (29 Jun 2020 07:00), Max: 37 (28 Jun 2020 15:00)  T(F): 97.5 (29 Jun 2020 07:00), Max: 98.6 (28 Jun 2020 15:00)  HR: 77 (29 Jun 2020 10:00) (69 - 78)  BP: 105/51 (29 Jun 2020 10:00) (90/55 - 146/93)  BP(mean): 73 (29 Jun 2020 10:00) (68 - 114)  RR: 23 (29 Jun 2020 10:00) (9 - 27)  SpO2: 90% (29 Jun 2020 10:00) (90% - 100%)    I&O's Summary    28 Jun 2020 07:01  -  29 Jun 2020 07:00  --------------------------------------------------------  IN: 2450 mL / OUT: 2501 mL / NET: -51 mL    29 Jun 2020 07:01  -  29 Jun 2020 11:01  --------------------------------------------------------  IN: 0 mL / OUT: 350 mL / NET: -350 mL                              7.3    2.67  )-----------( 17       ( 29 Jun 2020 00:16 )             21.0     06-29    130<L>  |  101  |  44<H>  ----------------------------<  197<H>  5.1   |  27  |  1.33<H>    Ca    9.9      29 Jun 2020 00:16  Phos  3.0     06-29  Mg     2.2     06-29    TPro  5.0<L>  /  Alb  3.2<L>  /  TBili  14.1<H>  /  DBili  3.0<H>  /  AST  21  /  ALT  14  /  AlkPhos  111  06-29          Culture - Blood (collected 06-24-20 @ 14:09)  Source: .Blood Blood-Peripheral  Preliminary Report (06-25-20 @ 15:01):    No growth to date.    Culture - Blood (collected 06-24-20 @ 14:09)  Source: .Blood Blood  Preliminary Report (06-25-20 @ 15:01):    No growth to date.      Review of systems  Gen: No weight changes, fatigue, fevers/chills, weakness  Skin: Jaundice, +R heel ulcer  Head/Eyes/Ears/Mouth: No headache; Normal hearing; Normal vision w/o blurriness; No sinus pain/discomfort.    Respiratory: No dyspnea, cough, wheezing, hemoptysis  CV: No chest pain, PND, orthopnea  GI: denies diarrhea, constipation, nausea, vomiting, melena, hematochezia  : No increased frequency, dysuria, hematuria, nocturia  MSK: No joint pain/swelling; no back pain; no edema  Neuro: No dizziness/lightheadedness, weakness, seizures, numbness, tingling  Heme: No easy bruising or bleeding  Endo: No heat/cold intolerance  Psych: No significant nervousness, anxiety, stress, depression  All other systems were reviewed and are negative, except as noted.      PHYSICAL EXAM:  Constitutional: Well developed / well nourished  Eyes: +Icteric, PERRLA  ENMT: nc/at  Neck: supple  Respiratory: CTA B/L  Cardiovascular: RRR  Abdominal:  soft, NT, ND  Genitourinary: Voiding spontaneously  Extremities: R heel wound w/ overlying eschar w/ no signs of infection.   Vascular: Palpable dp pulses bilaterally  Neurological: A&O x3 on exam with periods of confusion, No asterixis noted  Skin: R heel breakdown clean, covered with DSD.  No rashes or lesions.  B/L buttocks incontinent related dermatitis with no broken skin. Jaundiced  Musculoskeletal: Moving all extremities 2+ BLE edema  Psychiatric: Responsive

## 2020-06-29 NOTE — PROGRESS NOTE ADULT - SUBJECTIVE AND OBJECTIVE BOX
[facility] DIVISION OF KIDNEY DISEASES AND HYPERTENSION -- FOLLOW UP NOTE  --------------------------------------------------------------------------------  Abelino Patterson   Nephrology Fellow  Pager NS: [phone]/ [facility]: [phone]  (After 5 pm or on weekends please page the on-call fellow)    24 hour events/subjective: Patient seen and examined at the bedside. Vital signs, labs, medications reviewed.        PAST HISTORY  --------------------------------------------------------------------------------  No significant changes to PMH, PSH, FHx, SHx, unless otherwise noted    ALLERGIES & MEDICATIONS  --------------------------------------------------------------------------------  Allergies    codeine (Anaphylaxis)    Intolerances      Standing Inpatient Medications  chlorhexidine 2% Cloths 1 Application(s) Topical <User Schedule>  epoetin barry-epbx (RETACRIT) Injectable 05880 Unit(s) SubCutaneous <User Schedule>  folic acid 1 milliGRAM(s) Oral daily  furosemide    Tablet 40 milliGRAM(s) Oral daily  hepatitis B Vaccine - Adult (ENGERIX-B) 20 MICROGram(s) IntraMuscular once  insulin glargine Injectable (LANTUS) 9 Unit(s) SubCutaneous at bedtime  insulin lispro (HumaLOG) corrective regimen sliding scale   SubCutaneous three times a day before meals  insulin lispro (HumaLOG) corrective regimen sliding scale   SubCutaneous at bedtime  insulin lispro Injectable (HumaLOG) 3 Unit(s) SubCutaneous three times a day before meals  lactulose Syrup 30 Gram(s) Oral every 6 hours  midodrine. 20 milliGRAM(s) Oral three times a day  nystatin    Suspension 614440 Unit(s) Oral four times a day  nystatin Cream 1 Application(s) Topical two times a day  octreotide  Injectable 100 MICROGram(s) SubCutaneous every 8 hours  ondansetron Injectable 4 milliGRAM(s) IV Push every 8 hours  pantoprazole  Injectable 40 milliGRAM(s) IV Push every 12 hours  rifAXIMin 550 milliGRAM(s) Oral two times a day  tamsulosin 0.4 milliGRAM(s) Oral at bedtime  trimethoprim  160 mG/sulfamethoxazole 800 mG 1 Tablet(s) Oral daily    PRN Inpatient Medications      REVIEW OF SYSTEMS  --------------------------------------------------------------------------------  Gen: No fevers/chills  Head/Eyes/Ears/Mouth: No headache; Normal hearing; Normal vision    Respiratory: No dyspnea, cough  CV: No chest pain  GI: No abdominal pain, diarrhea, constipation, nausea, vomiting  : No increased frequency, dysuria  MSK: No joint pain/swelling; no edema    All other systems were reviewed and are negative, except as noted.    VITALS/PHYSICAL EXAM  --------------------------------------------------------------------------------  T(C): 36.4 (06-29-20 @ 07:00), Max: 37 (06-28-20 @ 15:00)  HR: 77 (06-29-20 @ 10:00) (69 - 78)  BP: 105/51 (06-29-20 @ 10:00) (90/55 - 146/93)  RR: 23 (06-29-20 @ 10:00) (9 - 27)  SpO2: 90% (06-29-20 @ 10:00) (90% - 100%)  Wt(kg): --        06-28-20 @ 07:01  -  06-29-20 @ 07:00  --------------------------------------------------------  IN: 2450 mL / OUT: 2501 mL / NET: -51 mL    06-29-20 @ 07:01  -  06-29-20 @ 11:32  --------------------------------------------------------  IN: 0 mL / OUT: 350 mL / NET: -350 mL      Physical Exam:  	  	Gen: NAD, well-appearing on room air  	HEENT: Scleral icterus  	Pulm: CTA B/L, no crackles  	CV: RRR, S1S2; no rub/murmur  	GI: +BS, soft, nontender  	: No suprapubic tenderness  	MSK: Warm, no edema              Neuro: AOx3, + mild asterixis   	Psych: Normal affect and mood  	Skin: Warm, no cyanosis    LABS/STUDIES  --------------------------------------------------------------------------------              7.3    2.67  >-----------<  17       [06-29-20 @ 00:16]              21.0     130  |  101  |  44  ----------------------------<  197      [06-29-20 @ 00:16]  5.1   |  27  |  1.33        Ca     9.9     [06-29-20 @ 00:16]      Mg     2.2     [06-29-20 @ 00:16]      Phos  3.0     [06-29-20 @ 00:16]    TPro  5.0  /  Alb  3.2  /  TBili  14.1  /  DBili  3.0  /  AST  21  /  ALT  14  /  AlkPhos  111  [06-29-20 @ 00:16]    PT/INR: PT 31.1 , INR 2.62       [06-29-20 @ 00:16]  PTT: 59.5       [06-29-20 @ 00:16]      Creatinine Trend:  SCr 1.33 [06-29 @ 00:16]  SCr 1.27 [06-28 @ 12:12]  SCr 1.16 [06-28 @ 02:03]  SCr 1.10 [06-27 @ 14:22]  SCr 1.22 [06-27 @ 01:46]              Urinalysis - [06-24-20 @ 16:37]      Color Yellow / Appearance Clear / SG 1.017 / pH 5.5      Gluc Negative / Ketone Negative  / Bili Negative / Urobili Negative       Blood Large / Protein Trace / Leuk Est Moderate / Nitrite Negative       / WBC 9 / Hyaline 0 / Gran  / Sq Epi  / Non Sq Epi 1 / Bacteria Negative    Urine Sodium <35      [06-23-20 @ 08:32]  Urine Osmolality 483      [06-23-20 @ 08:32]    Iron 160, TIBC Unable to calculate Test Repeated, %sat Unable to calculate Test Repeated      [06-12-20 @ 09:04]  Ferritin 493      [04-29-20 @ 08:20]  PTH -- (Ca 10.3)      [12-13-19 @ 08:11]   12  Vitamin D (25OH) 25.3      [12-13-19 @ 08:11]  HbA1c 6.4      [02-12-20 @ 17:08]  TSH 3.26      [04-26-20 @ 09:47]  Lipid: chol 71, TG 63, HDL 30, LDL 28      [11-27-19 @ 09:11]    HBsAb Nonreact      [06-13-20 @ 10:35]  HBsAg Nonreact      [06-13-20 @ 10:35]  HBcAb Nonreact      [06-13-20 @ 10:35]

## 2020-06-29 NOTE — PROGRESS NOTE ADULT - ASSESSMENT
· Assessment		  64M w/ R posterior heel wound, stable  -Pt seen and evaluated  -R posterior heel wound to subQ w/ overlying eschar, stable w/ no signs of infection  -XR negative for gas or OM  -No podiatric surgical intervention  -Recommend local wound care w/ betadine and DSD  -Offload feet at all times while in bed (Z flow boots or pillows underneath legs)  -[facility] up w/ Dr. Wang [facility] ([address], [address]) within 1 week of discharge. Call [phone] for appointment.

## 2020-06-29 NOTE — PROGRESS NOTE ADULT - ASSESSMENT
65 y/o male w/ a PMHx of HTN, HLD, DM type II, HFpEF (EF 70%), and cirrhosis secondary to JEAN c/b small esophageal varices, portal hypertensive gastropathy, ascites, SBP, hepatic encephalopathy, GAVE syndrome s/p APC, duodenal ulcer (5/2019), recent ESBL E coli prostate abscess (3/2020 s/p 4 weeks of ertapenem), and orthostatic hypotension (on midodrine) presenting with decompensated cirrhosis w/ a hospital course c/b MISA, hyponatremia, VRE UTI, acute blood loss anemia from GAVE syndrome, and hepatic encephalopathy    PLAN:    Neuro: hepatic encephalopathy  - Monitor mental status  - Rifaximin and lactulose for hepatic encephalopathy    Resp: no acute issues  - Monitor pulse oximeter  - Out of bed to chair, ambulate as tolerated, and incentive spirometry to prevent atelectasis    CV: HFpEF of ~70%, hypotension  - Monitor vital signs  - Midodrine 20 mg q8hrs for orthostatic hypotension  - Lasix 40 mg PO daily for HFpEF  - TTE from 6/15 demonstrates hyperdynamic LV systolic function w/ EF ~70% and grossly normal RV systolic function    GI: decompensated cirrhosis, GAVE syndrome  - Regular diet as tolerated  - Octreotide and Protonix 40 mg IV BID for GAVE syndrome  - Rifaximin and lactulose for hepatic encephalopathy    Renal: urinary retention, hyponatremia  - Monitor I&Os  - Monitor electrolytes and replete as necessary  - Flomax for BPH    Heme: coagulopathy  - Monitor CBC and coags  - Epogen for anemia as per nephrology  - Venodynes for mechanical VTE prophylaxis, holding chemical VTE prophylaxis in the setting of coagulopathy    ID: chronic UTI  - Monitor for clinical evidence of active infection  - Bactrim for chronic UTI prophylaxis  - Nystatin for thrush prophylaxis    Endo: DM type II, HLD  - Monitor glucose before meals and at bedtime  - Lantus 9 units, Humalog 3 units pre-meal, and ISS for glycemic control    Disposition:  - Full code  - Will remain in [facility]    Nubia Rios PA-C     [id number]

## 2020-06-29 NOTE — PROGRESS NOTE ADULT - SUBJECTIVE AND OBJECTIVE BOX
INTERVAL HPI/OVERNIGHT EVENTS:    no acute overnight events  h/h trend noted; no overt gastrointestinal bleeding      Allergies    codeine (Anaphylaxis)    Intolerances    General:  No wt loss, fevers, chills, night sweats, fatigue,   Eyes:  Good vision, no reported pain  ENT:  No sore throat, pain, runny nose, dysphagia  CV:  No pain, palpitations, hypo/hypertension  Resp:  No dyspnea, cough, tachypnea, wheezing  GI:  No pain, No nausea, No vomiting, No diarrhea, No constipation, No weight loss, No fever, No pruritis, No rectal bleeding, No tarry stools, No dysphagia,  :  No pain, bleeding, incontinence, nocturia  Muscle:  No pain, weakness  Neuro:  No weakness, tingling, memory problems  Psych:  No fatigue, insomnia, mood problems, depression  Endocrine:  No polyuria, polydipsia, cold/heat intolerance  Heme:  No petechiae, ecchymosis, easy bruisability  Skin:  No rash, tattoos, scars, edema    PHYSICAL EXAM:   Vital Signs:  Vital Signs Last 24 Hrs  T(C): 36.7 (14 Jun 2020 07:53), Max: 36.7 (14 Jun 2020 07:53)  T(F): 98.1 (14 Jun 2020 07:53), Max: 98.1 (14 Jun 2020 07:53)  HR: 79 (14 Jun 2020 07:53) (75 - 80)  BP: 127/77 (14 Jun 2020 07:53) (108/53 - 133/68)  BP(mean): --  RR: 18 (14 Jun 2020 07:53) (17 - 18)  SpO2: 97% (14 Jun 2020 07:53) (97% - 100%)  Daily     Daily I&O's Summary    13 Jun 2020 07:01  -  14 Jun 2020 07:00  --------------------------------------------------------  IN: 350 mL / OUT: 250 mL / NET: 100 mL    14 Jun 2020 07:01  -  14 Jun 2020 12:30  --------------------------------------------------------  IN: 0 mL / OUT: 350 mL / NET: -350 mL        GENERAL:  no distress  HEENT:  NC/AT   ABDOMEN:  Soft, non-tender, non-distended, normoactive bowel sounds  EXTEREMITIES:  + edema  SKIN:  No rash/erythema/ecchymoses/petechiae/wounds/abscess/warm/dry  NEURO:  awake, alert, oriented, +asterixis      LABS:                        9.0    5.07  )-----------( 68       ( 14 Jun 2020 07:17 )             26.4     06-14    131<L>  |  100  |  27<H>  ----------------------------<  141<H>  5.1   |  20<L>  |  1.74<H>    Ca    10.5      14 Jun 2020 07:17    TPro  6.3  /  Alb  3.8  /  TBili  11.0<H>  /  DBili  x   /  AST  24  /  ALT  15  /  AlkPhos  133<H>  06-14    PT/INR - ( 14 Jun 2020 09:19 )   PT: 26.3 sec;   INR: 2.23 ratio         PTT - ( 14 Jun 2020 09:19 )  PTT:44.2 sec    amylase   lipase  RADIOLOGY & ADDITIONAL TESTS:

## 2020-06-29 NOTE — PROGRESS NOTE ADULT - ATTENDING COMMENTS
I have personally seen, examined and participated in the care of this patient. I have reviewed all pertinent clinical information including history, physical exam , plan and fellow's note and agree with the plan

## 2020-06-29 NOTE — PROGRESS NOTE ADULT - SUBJECTIVE AND OBJECTIVE BOX
HISTORY:  65 y/o M with pMH of decompensated JEAN cirrhosis c/b small esophageal varices (12/2019), portal hypertensive gastropathy, ascites, hx SBP, hx hepatic encephalopathy, GAVE syndrome s/p APC, hx duodenal ulcer (5/2019), HTN, HLD, DM, HFpEF (EF 70%), recent ESBL E coli prostate abscess (3/2020 s/p 4 weeks ertapenem), orthostatic hypotension ( on midodrine) who was initially admitted to medicine for hepatic encephalopathy. Hospital course c/b MISA, VRE UTI, acute blood loss anemia, received blood transfusion, EGD done 6/22 with Grade II esophageal varices, acute gastritis with hemorrhage and acute duodenitis with hemorrhage. Patient had worsening mental status, concerning decompensating liver cirrhosis, hepatic encephalopathy patient then transferred to SICU for further care.    24 HOUR EVENTS: HISTORY:  65 y/o M with pMH of decompensated JEAN cirrhosis c/b small esophageal varices (12/2019), portal hypertensive gastropathy, ascites, hx SBP, hx hepatic encephalopathy, GAVE syndrome s/p APC, hx duodenal ulcer (5/2019), HTN, HLD, DM, HFpEF (EF 70%), recent ESBL E coli prostate abscess (3/2020 s/p 4 weeks ertapenem), orthostatic hypotension ( on midodrine) who was initially admitted to medicine for hepatic encephalopathy. Hospital course c/b MISA, VRE UTI, acute blood loss anemia, received blood transfusion, EGD done 6/22 with Grade II esophageal varices, acute gastritis with hemorrhage and acute duodenitis with hemorrhage. Patient had worsening mental status, concerning decompensating liver cirrhosis, hepatic encephalopathy patient then transferred to SICU for further care.    24 HOUR EVENTS:  - HCT trending down from 23.9 -> 21.2 -> 21.0  - Orientation to time and place continues to wax and wane HISTORY:  63 y/o M with PMHx of decompensated JEAN cirrhosis c/b small esophageal varices (12/2019), portal hypertensive gastropathy, ascites, hx SBP, hx hepatic encephalopathy, GAVE syndrome s/p APC, hx duodenal ulcer (5/2019), HTN, HLD, DM, HFpEF (EF 70%), recent ESBL E coli prostate abscess (3/2020 s/p 4 weeks ertapenem), orthostatic hypotension ( on midodrine) who was initially admitted to medicine for hepatic encephalopathy. Hospital course c/b MISA, VRE UTI, acute blood loss anemia, received blood transfusion, EGD done 6/22 with Grade II esophageal varices, acute gastritis with hemorrhage and acute duodenitis with hemorrhage. Patient had worsening mental status, concerning decompensating liver cirrhosis, hepatic encephalopathy patient then transferred to SICU for further care.    24 HOUR EVENTS:  - HCT trending down from 23.9 -> 21.2 -> 21.0  - Orientation to time and place continues to wax and wane    SUBJECTIVE/ROS:  [x] A ten-point review of systems was otherwise negative except as noted.  [ ] Due to altered mental status/intubation, subjective information were not able to be obtained from the patient. History was obtained, to the extent possible, from review of the chart and collateral sources of information.    NEURO  Exam: awake, alert, oriented x3, no acute distress, no focal deficits, intermittently confused but easily reorientated  Meds: none  [x] Adequacy of sedation and pain control has been assessed and adjusted    RESPIRATORY  RR: 18 (06-29-20 @ 04:00) (9 - 27)  SpO2: 100% (06-29-20 @ 04:00) (94% - 100%)  Exam: clear to auscultation bilaterally  Mechanical Ventilation: no  [N/A] Extubation Readiness Assessed  Meds: none    CARDIOVASCULAR  HR: 71 (06-29-20 @ 04:00) (69 - 81)  BP: 109/59 (06-29-20 @ 04:00) (108/56 - 146/93)  BP(mean): 79 (06-29-20 @ 04:00) (76 - 114)  Exam: regular rate and rhythm, S1S2  Cardiac Rhythm: sinus  Perfusion    [x]Adequate    [ ]Inadequate  Mentation   [x]Normal       [ ]Reduced  Extremities  [x]Warm         [ ]Cool  Volume Status [ ]Hypervolemic [x]Euvolemic [ ]Hypovolemic  Meds:  - furosemide    Tablet 40 milliGRAM(s) Oral daily  - midodrine. 20 milliGRAM(s) Oral three times a day  - tamsulosin 0.4 milliGRAM(s) Oral at bedtime    GI/NUTRITION  Exam: soft, nontender, nondistended  Diet: regular  Meds:  - lactulose Syrup 30 Gram(s) Oral every 6 hours  - octreotide  Injectable 100 MICROGram(s) SubCutaneous every 8 hours  - ondansetron Injectable 4 milliGRAM(s) IV Push every 8 hours  - pantoprazole  Injectable 40 milliGRAM(s) IV Push every 12 hours  - rifAXIMin 550 milliGRAM(s) Oral two times a day    GENITOURINARY  I&O's Detail  06-28 @ 07:01 - 06-29 @ 05:33  --------------------------------------------------------  IN:    Oral Fluid: 2000 mL    Solution: 50 mL    Solution: 250 mL    Solution: 100 mL  Total IN: 2400 mL    OUT:    Stool: 1 mL    Voided: 2500 mL  Total OUT: 2501 mL    Total NET: -101 mL    130<L>  |  101  |  44<H>  ----------------------------<  197<H>  5.1   |  27  |  1.33<H>    Ca    9.9      29 Jun 2020 00:16  Phos  3.0  Mg     2.2  TPro  5.0<L>  /  Alb  3.2<L>  /  TBili  14.1<H>  /  DBili  3.0<H>  /  AST  21  /  ALT  14  /  AlkPhos  111    [ ] Beasley catheter, indication: N/A  Meds: none    HEMATOLOGIC  Meds: epoetin barry-epbx (RETACRIT) Injectable 74292 Unit(s) SubCutaneous <User Schedule>  [x] VTE Prophylaxis                        7.3    2.67  )-----------( 17       ( 29 Jun 2020 00:16 )             21.0     PT/INR - ( 29 Jun 2020 00:16 )   PT: 31.1 sec;   INR: 2.62 ratio    PTT - ( 29 Jun 2020 00:16 )  PTT:59.5 sec    INFECTIOUS DISEASES  T(C): 36.4 (06-29-20 @ 03:00), Max: 37 (06-28-20 @ 15:00)  WBC Count:  - 2.67 K/uL (06-29 @ 00:16)  - 2.85 K/uL (06-28 @ 12:12)  Recent Cultures:  - Specimen Source: .Blood Blood, 06-24 @ 14:09  Results: No growth to date.  Gram Stain: --  Organism: --  Meds:   - nystatin    Suspension 842056 Unit(s) Oral four times a day  - trimethoprim  160 mG/sulfamethoxazole 800 mG 1 Tablet(s) Oral daily    ENDOCRINE  Capillary Blood Glucose:  - 241 mg/dL (28 Jun 2020 21:56)  - 202 mg/dL (28 Jun 2020 16:47)  - 146 mg/dL (28 Jun 2020 10:47)  - 126 mg/dL (28 Jun 2020 07:34)  Meds:  - insulin glargine Injectable (LANTUS) 9 Unit(s) SubCutaneous at bedtime  - insulin lispro (HumaLOG) corrective regimen sliding scale   SubCutaneous three times a day before meals  - insulin lispro (HumaLOG) corrective regimen sliding scale   SubCutaneous at bedtime  - insulin lispro Injectable (HumaLOG) 3 Unit(s) SubCutaneous three times a day before meals    ACCESS DEVICES:  [x] Peripheral IV  [ ] Central Venous Line	[ ] R	[ ] L	[ ] IJ	[ ] Fem	[ ] SC	Placed:   [ ] Arterial Line		[ ] R	[ ] L	[ ] Fem	[ ] Rad	[ ] Ax	Placed:   [ ] PICC:					[ ] Mediport  [ ] Urinary Catheter, Date Placed:   [x] Necessity of urinary, arterial, and venous catheters discussed    OTHER MEDICATIONS:  - chlorhexidine 2% Cloths 1 Application(s) Topical <User Schedule>  - folic acid 1 milliGRAM(s) Oral daily  - nystatin Cream 1 Application(s) Topical two times a day    CODE STATUS: Full code    IMAGING:

## 2020-06-29 NOTE — PROGRESS NOTE ADULT - SUBJECTIVE AND OBJECTIVE BOX
Follow Up:      Interval History:    REVIEW OF SYSTEMS  [  ] ROS unobtainable because:    [  ] All other systems negative except as noted below    Constitutional:  [ ] fever [ ] chills  [ ] weight loss  [ ] weakness  Skin:  [ ] rash [ ] phlebitis	  Eyes: [ ] icterus [ ] pain  [ ] discharge	  ENMT: [ ] sore throat  [ ] thrush [ ] ulcers [ ] exudates  Respiratory: [ ] dyspnea [ ] hemoptysis [ ] cough [ ] sputum	  Cardiovascular:  [ ] chest pain [ ] palpitations [ ] edema	  Gastrointestinal:  [ ] nausea [ ] vomiting [ ] diarrhea [ ] constipation [ ] pain	  Genitourinary:  [ ] dysuria [ ] frequency [ ] hematuria [ ] discharge [ ] flank pain  [ ] incontinence  Musculoskeletal:  [ ] myalgias [ ] arthralgias [ ] arthritis  [ ] back pain  Neurological:  [ ] headache [ ] seizures  [ ] confusion/altered mental status    Allergies  codeine (Anaphylaxis)        ANTIMICROBIALS:  nystatin    Suspension 767608 four times a day  rifAXIMin 550 two times a day  trimethoprim  160 mG/sulfamethoxazole 800 mG 1 daily      OTHER MEDS:  MEDICATIONS  (STANDING):  epoetin barry-epbx (RETACRIT) Injectable 32958 <User Schedule>  hepatitis B Vaccine - Adult (ENGERIX-B) 20 once  insulin glargine Injectable (LANTUS) 9 at bedtime  insulin lispro (HumaLOG) corrective regimen sliding scale  three times a day before meals  insulin lispro (HumaLOG) corrective regimen sliding scale  at bedtime  insulin lispro Injectable (HumaLOG) 3 three times a day before meals  lactulose Syrup 30 every 6 hours  midodrine. 20 three times a day  octreotide  Injectable 100 every 8 hours  ondansetron Injectable 4 every 8 hours  pantoprazole  Injectable 40 every 12 hours  tamsulosin 0.4 at bedtime      Vital Signs Last 24 Hrs  T(C): 36.5 (29 Jun 2020 17:00), Max: 37 (28 Jun 2020 23:00)  T(F): 97.7 (29 Jun 2020 17:00), Max: 98.6 (28 Jun 2020 23:00)  HR: 77 (29 Jun 2020 17:00) (69 - 150)  BP: 111/43 (29 Jun 2020 17:00) (90/55 - 146/93)  BP(mean): 79 (29 Jun 2020 11:00) (68 - 114)  RR: 17 (29 Jun 2020 17:00) (9 - 23)  SpO2: 96% (29 Jun 2020 17:00) (90% - 100%)    PHYSICAL EXAMINATION:  General: Alert and Awake, NAD  HEENT: PERRL, EOMI  Neck: Supple  Cardiac: RRR, No M/R/G  Resp: CTAB, No Wh/Rh/Ra  Abdomen: NBS, NT/ND, No HSM, No rigidity or guarding  MSK: No LE edema. No Calf tenderness  : No bob  Skin: No rashes or lesions. Skin is warm and dry to the touch.   Neuro: Alert and Awake. CN 2-12 Grossly intact. Moves all four extremities spontaneously.  Psych: Calm, Pleasant, Cooperative                          7.3    2.67  )-----------( 17       ( 29 Jun 2020 00:16 )             21.0       06-29    130<L>  |  101  |  44<H>  ----------------------------<  197<H>  5.1   |  27  |  1.33<H>    Ca    9.9      29 Jun 2020 00:16  Phos  3.0     06-29  Mg     2.2     06-29    TPro  5.0<L>  /  Alb  3.2<L>  /  TBili  14.1<H>  /  DBili  3.0<H>  /  AST  21  /  ALT  14  /  AlkPhos  111  06-29          MICROBIOLOGY:  v  .Blood Blood  06-24-20   No Growth Final  --  --      .Urine Clean Catch (Midstream)  06-21-20   <10,000 CFU/ml of other organisms  --  --      .Blood Blood-Peripheral  06-21-20   No Growth Final  --  --      .Urine Catheterized  06-14-20   >100,000 CFU/ml Enterococcus faecium (vancomycin resistant)  --  Enterococcus faecium (vancomycin resistant)      .Urine Clean Catch (Midstream)  06-14-20   >100,000 CFU/ml Enterococcus faecium (vancomycin resistant)  --  Enterococcus faecium (vancomycin resistant)      .Blood Blood-Peripheral  06-12-20   No Growth Final  --  --        CMV IgG Antibody: <0.20 U/mL (12-04-19 @ 08:33)  Toxoplasma IgG Screen: <3.0 IU/mL (12-04-19 @ 08:33)          RADIOLOGY:    <The imaging below has been reviewed and visualized by me independently. Findings as detailed in report below> Follow Up:  pre-OLT    Interval History: afebrile. no acute overnight events. denies n/v/d or abdominal pain. denies urinary symptoms.     REVIEW OF SYSTEMS  [  ] ROS unobtainable because:    [ x ] All other systems negative except as noted below    Constitutional:  [ ] fever [ ] chills  [ ] weight loss  [ ] weakness  Skin:  [ ] rash [ ] phlebitis	  Eyes: [ ] icterus [ ] pain  [ ] discharge	  ENMT: [ ] sore throat  [ ] thrush [ ] ulcers [ ] exudates  Respiratory: [ ] dyspnea [ ] hemoptysis [ ] cough [ ] sputum	  Cardiovascular:  [ ] chest pain [ ] palpitations [ ] edema	  Gastrointestinal:  [ ] nausea [ ] vomiting [ ] diarrhea [ ] constipation [ ] pain	  Genitourinary:  [ ] dysuria [ ] frequency [ ] hematuria [ ] discharge [ ] flank pain  [ ] incontinence  Musculoskeletal:  [ ] myalgias [ ] arthralgias [ ] arthritis  [ ] back pain  Neurological:  [ ] headache [ ] seizures  [ ] confusion/altered mental status    Allergies  codeine (Anaphylaxis)        ANTIMICROBIALS:  nystatin    Suspension [id number] four times a day  rifAXIMin 550 two times a day  trimethoprim  160 mG/sulfamethoxazole 800 mG 1 daily      OTHER MEDS:  MEDICATIONS  (STANDING):  epoetin barry-epbx (RETACRIT) Injectable 49458 <User Schedule>  hepatitis B Vaccine - Adult (ENGERIX-B) 20 once  insulin glargine Injectable (LANTUS) 9 at bedtime  insulin lispro (HumaLOG) corrective regimen sliding scale  three times a day before meals  insulin lispro (HumaLOG) corrective regimen sliding scale  at bedtime  insulin lispro Injectable (HumaLOG) 3 three times a day before meals  lactulose Syrup 30 every 6 hours  midodrine. 20 three times a day  octreotide  Injectable 100 every 8 hours  ondansetron Injectable 4 every 8 hours  pantoprazole  Injectable 40 every 12 hours  tamsulosin 0.4 at bedtime      Vital Signs Last 24 Hrs  T(C): 36.5 (29 Jun 2020 17:00), Max: 37 (28 Jun 2020 23:00)  T(F): 97.7 (29 Jun 2020 17:00), Max: 98.6 (28 Jun 2020 23:00)  HR: 77 (29 Jun 2020 17:00) (69 - 150)  BP: 111/43 (29 Jun 2020 17:00) (90/55 - 146/93)  BP(mean): 79 (29 Jun 2020 11:00) (68 - 114)  RR: 17 (29 Jun 2020 17:00) (9 - 23)  SpO2: 96% (29 Jun 2020 17:00) (90% - 100%)    PHYSICAL EXAMINATION:  General: Alert and Awake  HEENT: PERRL, EOMI, scleral icterus  Neck: Supple  Cardiac: RRR, No M/R/G  Resp: CTAB, No Wh/Rh/Ra  Abdomen: NBS, NT/ND, No HSM, No rigidity or guarding  MSK: R heel with overlying ulcer. 1+ LE edema. No Calf tenderness  Skin: No rashes or lesions. Skin is warm and dry to the touch.   Neuro: Alert and Awake. oriented 3x CN 2-12 Grossly intact. Moves all four extremities spontaneously.  Psych: Calm, Pleasant, Cooperative                          7.3    2.67  )-----------( 17       ( 29 Jun 2020 00:16 )             21.0       06-29    130<L>  |  101  |  44<H>  ----------------------------<  197<H>  5.1   |  27  |  1.33<H>    Ca    9.9      29 Jun 2020 00:16  Phos  3.0     06-29  Mg     2.2     06-29    TPro  5.0<L>  /  Alb  3.2<L>  /  TBili  14.1<H>  /  DBili  3.0<H>  /  AST  21  /  ALT  14  /  AlkPhos  111  06-29          MICROBIOLOGY:  v  .Blood Blood  06-24-20   No Growth Final  --  --      .Urine Clean Catch (Midstream)  06-21-20   <10,000 CFU/ml of other organisms  --  --      .Blood Blood-Peripheral  06-21-20   No Growth Final  --  --      .Urine Catheterized  06-14-20   >100,000 CFU/ml Enterococcus faecium (vancomycin resistant)  --  Enterococcus faecium (vancomycin resistant)      .Urine Clean Catch (Midstream)  06-14-20   >100,000 CFU/ml Enterococcus faecium (vancomycin resistant)  --  Enterococcus faecium (vancomycin resistant)      .Blood Blood-Peripheral  06-12-20   No Growth Final  --  --        CMV IgG Antibody: <0.20 U/mL (12-04-19 @ 08:33)  Toxoplasma IgG Screen: <3.0 IU/mL (12-04-19 @ 08:33)          RADIOLOGY:    <The imaging below has been reviewed and visualized by me independently. Findings as detailed in report below>    EXAM:  US DPLX ABDOMEN                        PROCEDURE DATE:  06/24/2020    Limited study.  IVC, main portal vein, left portal vein, and splenic vein at the hilum are patent. Prominent patent superior mesenteric vein.  Nonvisualization of the right portal vein, hepatic arteries, and splenic confluence.

## 2020-06-30 LAB
ALBUMIN SERPL ELPH-MCNC: 3.3 G/DL — SIGNIFICANT CHANGE UP (ref 3.3–5)
ALP SERPL-CCNC: 137 U/L — HIGH (ref 40–120)
ALT FLD-CCNC: 16 U/L — SIGNIFICANT CHANGE UP (ref 10–45)
AMMONIA BLD-MCNC: 52 UMOL/L — SIGNIFICANT CHANGE UP (ref 11–55)
ANION GAP SERPL CALC-SCNC: 9 MMOL/L — SIGNIFICANT CHANGE UP (ref 5–17)
APTT BLD: 52.1 SEC — HIGH (ref 27.5–35.5)
AST SERPL-CCNC: 29 U/L — SIGNIFICANT CHANGE UP (ref 10–40)
BILIRUB DIRECT SERPL-MCNC: 2.7 MG/DL — HIGH (ref 0–0.2)
BILIRUB INDIRECT FLD-MCNC: 10.3 MG/DL — HIGH (ref 0.2–1)
BILIRUB SERPL-MCNC: 13 MG/DL — HIGH (ref 0.2–1.2)
BUN SERPL-MCNC: 47 MG/DL — HIGH (ref 7–23)
CALCIUM SERPL-MCNC: 10 MG/DL — SIGNIFICANT CHANGE UP (ref 8.4–10.5)
CHLORIDE SERPL-SCNC: 101 MMOL/L — SIGNIFICANT CHANGE UP (ref 96–108)
CO2 SERPL-SCNC: 23 MMOL/L — SIGNIFICANT CHANGE UP (ref 22–31)
CREAT SERPL-MCNC: 1.43 MG/DL — HIGH (ref 0.5–1.3)
FIBRINOGEN PPP-MCNC: 108 MG/DL — LOW (ref 350–510)
GLUCOSE BLDC GLUCOMTR-MCNC: 104 MG/DL — HIGH (ref 70–99)
GLUCOSE BLDC GLUCOMTR-MCNC: 105 MG/DL — HIGH (ref 70–99)
GLUCOSE BLDC GLUCOMTR-MCNC: 131 MG/DL — HIGH (ref 70–99)
GLUCOSE BLDC GLUCOMTR-MCNC: 178 MG/DL — HIGH (ref 70–99)
GLUCOSE SERPL-MCNC: 180 MG/DL — HIGH (ref 70–99)
HCT VFR BLD CALC: 20.7 % — CRITICAL LOW (ref 39–50)
HCT VFR BLD CALC: 21.1 % — LOW (ref 39–50)
HCT VFR BLD CALC: 23 % — LOW (ref 39–50)
HCT VFR BLD CALC: 23.9 % — LOW (ref 39–50)
HGB BLD-MCNC: 7 G/DL — CRITICAL LOW (ref 13–17)
HGB BLD-MCNC: 7.1 G/DL — LOW (ref 13–17)
HGB BLD-MCNC: 7.9 G/DL — LOW (ref 13–17)
HGB BLD-MCNC: 8.2 G/DL — LOW (ref 13–17)
INR BLD: 2.66 RATIO — HIGH (ref 0.88–1.16)
MAGNESIUM SERPL-MCNC: 1.7 MG/DL — SIGNIFICANT CHANGE UP (ref 1.6–2.6)
MCHC RBC-ENTMCNC: 32.3 PG — SIGNIFICANT CHANGE UP (ref 27–34)
MCHC RBC-ENTMCNC: 32.4 PG — SIGNIFICANT CHANGE UP (ref 27–34)
MCHC RBC-ENTMCNC: 33 PG — SIGNIFICANT CHANGE UP (ref 27–34)
MCHC RBC-ENTMCNC: 33.1 PG — SIGNIFICANT CHANGE UP (ref 27–34)
MCHC RBC-ENTMCNC: 33.6 GM/DL — SIGNIFICANT CHANGE UP (ref 32–36)
MCHC RBC-ENTMCNC: 33.8 GM/DL — SIGNIFICANT CHANGE UP (ref 32–36)
MCHC RBC-ENTMCNC: 34.3 GM/DL — SIGNIFICANT CHANGE UP (ref 32–36)
MCHC RBC-ENTMCNC: 34.3 GM/DL — SIGNIFICANT CHANGE UP (ref 32–36)
MCV RBC AUTO: 94.5 FL — SIGNIFICANT CHANGE UP (ref 80–100)
MCV RBC AUTO: 95.4 FL — SIGNIFICANT CHANGE UP (ref 80–100)
MCV RBC AUTO: 96.2 FL — SIGNIFICANT CHANGE UP (ref 80–100)
MCV RBC AUTO: 98.1 FL — SIGNIFICANT CHANGE UP (ref 80–100)
NRBC # BLD: 0 /100 WBCS — SIGNIFICANT CHANGE UP (ref 0–0)
PHOSPHATE SERPL-MCNC: 3.1 MG/DL — SIGNIFICANT CHANGE UP (ref 2.5–4.5)
PLATELET # BLD AUTO: 15 K/UL — CRITICAL LOW (ref 150–400)
PLATELET # BLD AUTO: 15 K/UL — CRITICAL LOW (ref 150–400)
PLATELET # BLD AUTO: 16 K/UL — CRITICAL LOW (ref 150–400)
PLATELET # BLD AUTO: 18 K/UL — CRITICAL LOW (ref 150–400)
POTASSIUM SERPL-MCNC: 5.2 MMOL/L — SIGNIFICANT CHANGE UP (ref 3.5–5.3)
POTASSIUM SERPL-SCNC: 5.2 MMOL/L — SIGNIFICANT CHANGE UP (ref 3.5–5.3)
PROT SERPL-MCNC: 5.1 G/DL — LOW (ref 6–8.3)
PROTHROM AB SERPL-ACNC: 28.9 SEC — HIGH (ref 10.6–13.6)
RBC # BLD: 2.15 M/UL — LOW (ref 4.2–5.8)
RBC # BLD: 2.17 M/UL — LOW (ref 4.2–5.8)
RBC # BLD: 2.39 M/UL — LOW (ref 4.2–5.8)
RBC # BLD: 2.53 M/UL — LOW (ref 4.2–5.8)
RBC # FLD: 17.1 % — HIGH (ref 10.3–14.5)
RBC # FLD: 17.2 % — HIGH (ref 10.3–14.5)
RBC # FLD: 18 % — HIGH (ref 10.3–14.5)
RBC # FLD: 18.2 % — HIGH (ref 10.3–14.5)
SODIUM SERPL-SCNC: 133 MMOL/L — LOW (ref 135–145)
WBC # BLD: 12.2 K/UL — HIGH (ref 3.8–10.5)
WBC # BLD: 2.68 K/UL — LOW (ref 3.8–10.5)
WBC # BLD: 2.8 K/UL — LOW (ref 3.8–10.5)
WBC # BLD: 4.95 K/UL — SIGNIFICANT CHANGE UP (ref 3.8–10.5)
WBC # FLD AUTO: 12.2 K/UL — HIGH (ref 3.8–10.5)
WBC # FLD AUTO: 2.68 K/UL — LOW (ref 3.8–10.5)
WBC # FLD AUTO: 2.8 K/UL — LOW (ref 3.8–10.5)
WBC # FLD AUTO: 4.95 K/UL — SIGNIFICANT CHANGE UP (ref 3.8–10.5)

## 2020-06-30 PROCEDURE — 99233 SBSQ HOSP IP/OBS HIGH 50: CPT

## 2020-06-30 PROCEDURE — 99291 CRITICAL CARE FIRST HOUR: CPT

## 2020-06-30 PROCEDURE — 99223 1ST HOSP IP/OBS HIGH 75: CPT

## 2020-06-30 RX ORDER — OCTREOTIDE ACETATE 200 UG/ML
50 INJECTION, SOLUTION INTRAVENOUS; SUBCUTANEOUS
Qty: 500 | Refills: 0 | Status: DISCONTINUED | OUTPATIENT
Start: 2020-06-30 | End: 2020-07-02

## 2020-06-30 RX ORDER — FUROSEMIDE 40 MG
60 TABLET ORAL ONCE
Refills: 0 | Status: COMPLETED | OUTPATIENT
Start: 2020-06-30 | End: 2020-06-30

## 2020-06-30 RX ORDER — CLARITHROMYCIN 500 MG
500 TABLET ORAL
Refills: 0 | Status: DISCONTINUED | OUTPATIENT
Start: 2020-06-30 | End: 2020-07-01

## 2020-06-30 RX ORDER — METRONIDAZOLE 500 MG
500 TABLET ORAL EVERY 8 HOURS
Refills: 0 | Status: DISCONTINUED | OUTPATIENT
Start: 2020-06-30 | End: 2020-07-01

## 2020-06-30 RX ORDER — FILGRASTIM 480MCG/1.6
480 VIAL (ML) INJECTION ONCE
Refills: 0 | Status: COMPLETED | OUTPATIENT
Start: 2020-06-30 | End: 2020-06-30

## 2020-06-30 RX ADMIN — PANTOPRAZOLE SODIUM 40 MILLIGRAM(S): 20 TABLET, DELAYED RELEASE ORAL at 05:09

## 2020-06-30 RX ADMIN — Medication 60 MILLIGRAM(S): at 02:00

## 2020-06-30 RX ADMIN — NYSTATIN CREAM 1 APPLICATION(S): 100000 CREAM TOPICAL at 17:03

## 2020-06-30 RX ADMIN — Medication 500000 UNIT(S): at 17:02

## 2020-06-30 RX ADMIN — MIDODRINE HYDROCHLORIDE 20 MILLIGRAM(S): 2.5 TABLET ORAL at 17:03

## 2020-06-30 RX ADMIN — Medication 1 MILLIGRAM(S): at 11:01

## 2020-06-30 RX ADMIN — Medication 500 MILLIGRAM(S): at 13:15

## 2020-06-30 RX ADMIN — PANTOPRAZOLE SODIUM 40 MILLIGRAM(S): 20 TABLET, DELAYED RELEASE ORAL at 17:02

## 2020-06-30 RX ADMIN — OCTREOTIDE ACETATE 100 MICROGRAM(S): 200 INJECTION, SOLUTION INTRAVENOUS; SUBCUTANEOUS at 05:09

## 2020-06-30 RX ADMIN — Medication 500000 UNIT(S): at 11:01

## 2020-06-30 RX ADMIN — MIDODRINE HYDROCHLORIDE 20 MILLIGRAM(S): 2.5 TABLET ORAL at 05:09

## 2020-06-30 RX ADMIN — Medication 1 TABLET(S): at 11:01

## 2020-06-30 RX ADMIN — Medication 500000 UNIT(S): at 23:54

## 2020-06-30 RX ADMIN — CHLORHEXIDINE GLUCONATE 1 APPLICATION(S): 213 SOLUTION TOPICAL at 05:09

## 2020-06-30 RX ADMIN — MIDODRINE HYDROCHLORIDE 20 MILLIGRAM(S): 2.5 TABLET ORAL at 11:01

## 2020-06-30 RX ADMIN — NYSTATIN CREAM 1 APPLICATION(S): 100000 CREAM TOPICAL at 05:09

## 2020-06-30 RX ADMIN — Medication 480 MICROGRAM(S): at 09:15

## 2020-06-30 RX ADMIN — Medication 1: at 08:02

## 2020-06-30 RX ADMIN — Medication 500 MILLIGRAM(S): at 17:02

## 2020-06-30 RX ADMIN — OCTREOTIDE ACETATE 10 MICROGRAM(S)/HR: 200 INJECTION, SOLUTION INTRAVENOUS; SUBCUTANEOUS at 21:32

## 2020-06-30 RX ADMIN — Medication 500 MILLIGRAM(S): at 21:32

## 2020-06-30 RX ADMIN — Medication 500000 UNIT(S): at 05:09

## 2020-06-30 RX ADMIN — TAMSULOSIN HYDROCHLORIDE 0.4 MILLIGRAM(S): 0.4 CAPSULE ORAL at 21:32

## 2020-06-30 RX ADMIN — ONDANSETRON 4 MILLIGRAM(S): 8 TABLET, FILM COATED ORAL at 21:32

## 2020-06-30 RX ADMIN — ONDANSETRON 4 MILLIGRAM(S): 8 TABLET, FILM COATED ORAL at 05:09

## 2020-06-30 RX ADMIN — OCTREOTIDE ACETATE 10 MICROGRAM(S)/HR: 200 INJECTION, SOLUTION INTRAVENOUS; SUBCUTANEOUS at 06:00

## 2020-06-30 RX ADMIN — ONDANSETRON 4 MILLIGRAM(S): 8 TABLET, FILM COATED ORAL at 13:15

## 2020-06-30 NOTE — PROGRESS NOTE ADULT - ASSESSMENT
24 HOUR EVENTS:  - Transferred to the floor in the AM  - Acute drop in H/H associated with melena to 6.4/19.3  - Transfused 1 unit pRBC with response to 7.1/21.1, then given additional unit  - Transferred back to SICU for hemodynamic monitoring  - Switched back to octreotide infusion      64 M hx of DM, HLD, GERD, HFpEF with mild LV diastolic dysfunction, and decompensated JEAN cirrhosis c/b ascites with hx of SBP, HE, duodenal ulcer, GAVE and duodenal AVM s/p APC (last on 10/11/19), UNOS listed for liver transplantation at [facility]. 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 He is now re-admitted with hepatic encephalopathy and VRE UTI, also with MISA on CKD. Pt awaiting liver transplant.    Impression:  #Cirrhosis 2/2 JEAN, decompensated by ascites; listed for liver transplant  - varices: grade II on EGD 6/22  - ascites: trace on U/S this admission  - HCC: neg on U/S this admission   -HE: + asterixis, on rifaximin and lactulose   - MELD-Na = 32 on 6/30  #GI bleed with acute blood loss anemia s/p 10U PRBCs (1 on 6/17, 2 on 6/21, 1 on 6/23, 2 on 6/24, 1 on 6/26, 1 on 6/27, 2 on 6/30) and EGD 6/22, bleeding from known GAVE  #R posterior heel wound w/ overlying eschar – no signs of infections, XR neg for gas, evaluated by podiatry and ID. MRI w/o contrast limited, but no overt signs of active infection.  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    Recommendation:  - will consider risks/benefits of EGD and discuss w/ transplant surgery/SICU team  - trend Hb, transfuse to Hb > 7 only  - c/w octreotide, infusion is okay for now  - c/w PPI IV BID  - trend CMP, CBC, INR daily  - c/w lactulose, would not hold for bleeding as bleeding can precipitate worsening HE  - c/w rifaximin  - c/w Bactrim for UTIs per ID  - continue home midodrine   - rest of care per SICU team    Ze Dominguez  Gastroenterology Fellow  MONDAY-FRIDAY 8AM-5PM:  Available via [employer] Teams  Call [phone] ([facility]) or Page [phone] ([facility]) from 8am-5pm M-F  AT NIGHT AND ON WEEKENDS:  Please contact on-call GI fellow via answering service ([phone]) 64 M hx of DM, HLD, GERD, HFpEF with mild LV diastolic dysfunction, and decompensated JEAN cirrhosis c/b ascites with hx of SBP, HE, duodenal ulcer, GAVE and duodenal AVM s/p APC (last on 10/11/19), UNOS listed for liver transplantation at [facility]. 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 He is now re-admitted with hepatic encephalopathy and VRE UTI, also with MISA on CKD. Pt awaiting liver transplant.    Impression:  #Cirrhosis 2/2 JEAN, decompensated by ascites; listed for liver transplant  - varices: grade II on EGD 6/22  - ascites: trace on U/S this admission  - HCC: neg on U/S this admission   -HE: + asterixis, on rifaximin and lactulose   - MELD-Na = 31 on 6/30  #GI bleed with acute blood loss anemia s/p 10U PRBCs (1 on 6/17, 2 on 6/21, 1 on 6/23, 2 on 6/24, 1 on 6/26, 1 on 6/27, 2 on 6/30) and EGD 6/22, bleeding from known GAVE  #R posterior heel wound w/ overlying eschar – no signs of infections, XR neg for gas, evaluated by podiatry and ID. MRI w/o contrast limited, but no overt signs of active infection.  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    Recommendation:  - will consider risks/benefits of EGD and discuss w/ transplant surgery/SICU team  - trend Hb, transfuse to Hb > 7 only  - c/w octreotide, infusion is okay for now  - c/w PPI IV BID  - trend CMP, CBC, INR daily  - c/w lactulose, would not hold for bleeding as bleeding can precipitate worsening HE  - c/w rifaximin  - c/w Bactrim for UTIs per ID  - continue home midodrine   - rest of care per SICU team    Ze Dominguez  Gastroenterology Fellow  MONDAY-FRIDAY 8AM-5PM:  Available via [employer] Teams  Call [phone] ([facility]) or Page [phone] ([facility]) from 8am-5pm M-F  AT NIGHT AND ON WEEKENDS:  Please contact on-call GI fellow via answering service ([phone]) 64 M hx of DM, HLD, GERD, HFpEF with mild LV diastolic dysfunction, and decompensated JEAN cirrhosis c/b ascites with hx of SBP, HE, duodenal ulcer, GAVE and duodenal AVM s/p APC (last on 10/11/19), UNOS listed for liver transplantation at [facility]. 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 He is now re-admitted with hepatic encephalopathy and VRE UTI, also with MISA on CKD. Pt awaiting liver transplant.    Impression:  #Cirrhosis 2/2 JEAN, decompensated by ascites; listed for liver transplant  - varices: grade II on EGD 6/22  - ascites: trace on U/S this admission  - HCC: neg on U/S this admission   -HE: + asterixis, on rifaximin and lactulose   - MELD-Na = 31 on 6/30  #GI bleed with acute blood loss anemia s/p 10U PRBCs (1 on 6/17, 2 on 6/21, 1 on 6/23, 2 on 6/24, 1 on 6/26, 1 on 6/27, 2 on 6/30) and EGD 6/22, bleeding from known GAVE  #R posterior heel wound w/ overlying eschar – no signs of infections, XR neg for gas, evaluated by podiatry and ID. MRI w/o contrast limited, but no overt signs of active infection.  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    Recommendation:  - likely EGD tomorrow in endoscopy unit w/ plan to APC known GAVE  - trend Hb, transfuse to Hb > 7 only  - no need for plts today, hepatology will call SICU in AM to discuss timing of plt transfusion in AM, please do not give FFP/cryo  - c/w octreotide, infusion is okay for now  - c/w PPI IV BID  - trend CMP, CBC, INR daily  - c/w lactulose, would not hold for bleeding as bleeding can precipitate worsening HE  - c/w rifaximin  - c/w Bactrim for UTIs per ID  - continue home midodrine   - rest of care per SICU team    Ze Dominguez  Gastroenterology Fellow  MONDAY-FRIDAY 8AM-5PM:  Available via Microsoft Teams  Call [phone] ([facility]) or Page [phone] ([facility]) from 8am-5pm M-F  AT NIGHT AND ON WEEKENDS:  Please contact on-call GI fellow via answering service ([phone])

## 2020-06-30 NOTE — PROGRESS NOTE ADULT - SUBJECTIVE AND OBJECTIVE BOX
Chief Complaint:  Patient is a 64y old  Male who presents with a chief complaint of Liver failure, hepatic encephalopathy (2020 05:20)    Reason for consult: cirrhosis, HE    Interval Events: transferred to floor but back to SICU given Hb drop to 6.4. Having dark bowel movements. Pt feels intermittently confused.     Hospital Medications:  chlorhexidine 2% Cloths 1 Application(s) Topical <User Schedule>  epoetin barry-epbx (RETACRIT) Injectable 97603 Unit(s) SubCutaneous <User Schedule>  folic acid 1 milliGRAM(s) Oral daily  hepatitis B Vaccine - Adult (ENGERIX-B) 20 MICROGram(s) IntraMuscular once  insulin glargine Injectable (LANTUS) 9 Unit(s) SubCutaneous at bedtime  insulin lispro (HumaLOG) corrective regimen sliding scale   SubCutaneous three times a day before meals  insulin lispro (HumaLOG) corrective regimen sliding scale   SubCutaneous at bedtime  insulin lispro Injectable (HumaLOG) 3 Unit(s) SubCutaneous three times a day before meals  lactulose Syrup 30 Gram(s) Oral every 6 hours  midodrine. 20 milliGRAM(s) Oral three times a day  nystatin    Suspension 690873 Unit(s) Oral four times a day  nystatin Cream 1 Application(s) Topical two times a day  octreotide  Infusion 50 MICROgram(s)/Hr IV Continuous <Continuous>  ondansetron Injectable 4 milliGRAM(s) IV Push every 8 hours  pantoprazole  Injectable 40 milliGRAM(s) IV Push every 12 hours  rifAXIMin 550 milliGRAM(s) Oral two times a day  tamsulosin 0.4 milliGRAM(s) Oral at bedtime  trimethoprim  160 mG/sulfamethoxazole 800 mG 1 Tablet(s) Oral daily      ROS:   General:  No  fevers, chills, night sweats, fatigue  Eyes:  Good vision, no reported pain  ENT:  No sore throat, pain, runny nose  CV:  No pain, palpitations  Pulm:  No dyspnea, cough  GI:  See HPI, otherwise negative  :  No  incontinence, nocturia  Muscle:  No pain, weakness  Neuro:  No memory problems  Psych:  No insomnia, mood problems, depression  Endocrine:  No polyuria, polydipsia, cold/heat intolerance  Heme:  No petechiae, ecchymosis, easy bruisability  Skin:  No rash    PHYSICAL EXAM:   Vital Signs:  Vital Signs Last 24 Hrs  T(C): 36.9 (2020 07:00), Max: 37.4 (2020 22:23)  T(F): 98.4 (2020 07:00), Max: 99.4 (2020 22:23)  HR: 75 (2020 09:00) (74 - 150)  BP: 114/58 (2020 09:00) (110/50 - 139/56)  BP(mean): 83 (2020 09:) (76 - 85)  RR: 8 (2020 09:00) (8 - 20)  SpO2: 100% (2020 09:00) (93% - 100%)  Daily     Daily Weight in k.4 (2020 04:00)    GENERAL: no acute distress  NEURO: alert, ++ asterixis  HEENT: anicteric sclera, no conjunctival pallor appreciated  CHEST: no respiratory distress, no accessory muscle use  CARDIAC: regular rate, rhythm  ABDOMEN: soft, non-tender, non-distended, no rebound or guarding  EXTREMITIES: warm, well perfused, no edema  SKIN: no lesions noted    LABS: reviewed                        7.9    2.68  )-----------( 15       ( 2020 05:17 )             23.0     06-30    133<L>  |  101  |  47<H>  ----------------------------<  180<H>  5.2   |  23  |  1.43<H>    Ca    10.0      2020 05:17  Phos  3.1     06-30  Mg     1.7     06-30    TPro  5.1<L>  /  Alb  3.3  /  TBili  13.0<H>  /  DBili  2.7<H>  /  AST  29  /  ALT  16  /  AlkPhos  137<H>  06-30    LIVER FUNCTIONS - ( 2020 05:17 )  Alb: 3.3 g/dL / Pro: 5.1 g/dL / ALK PHOS: 137 U/L / ALT: 16 U/L / AST: 29 U/L / GGT: x             Interval Diagnostic Studies: see sunrise for full report

## 2020-06-30 NOTE — PROGRESS NOTE ADULT - SUBJECTIVE AND OBJECTIVE BOX
SICU DAILY PROGRESS NOTE    63 y/o M with PMHx of decompensated JEAN cirrhosis c/b small esophageal varices (12/2019), portal hypertensive gastropathy, ascites, hx SBP, hx hepatic encephalopathy, GAVE syndrome s/p APC, hx duodenal ulcer (5/2019), HTN, HLD, DM, HFpEF (EF 70%), recent ESBL E coli prostate abscess (3/2020 s/p 4 weeks ertapenem), orthostatic hypotension ( on midodrine) who was initially admitted to medicine for hepatic encephalopathy. Hospital course c/b MISA, VRE UTI, acute blood loss anemia, received blood transfusion, EGD done 6/22 with Grade II esophageal varices, acute gastritis with hemorrhage and acute duodenitis with hemorrhage. Patient had worsening mental status, concerning decompensating liver cirrhosis, hepatic encephalopathy patient then transferred to SICU for further care.    24 HOUR EVENTS:  - Transferred to the floor in the AM  - Acute drop in H/H associated with melena to 6.4/19.3  - Transfused 1 unit pRBC with response to 7.1/21.1, then given additional unit  - Transferred back to SICU for hemodynamic monitoring  - Switched back to octreotide infusion    SUBJECTIVE/ROS:  [ ] A ten-point review of systems was otherwise negative except as noted.  [ ] Due to altered mental status/intubation, subjective information were not able to be obtained from the patient. History was obtained, to the extent possible, from review of the chart and collateral sources of information.      NEURO  RASS:     GCS:     CAM ICU:  Exam: awake, alert, oriented  Meds: ondansetron Injectable 4 milliGRAM(s) IV Push every 8 hours    [x] Adequacy of sedation and pain control has been assessed and adjusted      RESPIRATORY  RR: 12 (06-30-20 @ 05:00) (9 - 23)  SpO2: 97% (06-30-20 @ 05:00) (90% - 100%)  Wt(kg): --  Exam: unlabored  Mechanical Ventilation:     [N/A] Extubation Readiness Assessed  Meds:       CARDIOVASCULAR  HR: 77 (06-30-20 @ 05:00) (69 - 150)  BP: 115/56 (06-30-20 @ 05:00) (90/55 - 139/56)  BP(mean): 81 (06-30-20 @ 05:00) (68 - 84)  ABP: --  ABP(mean): --  Wt(kg): --  CVP(cm H2O): --      Exam: regular rate and rhythm  Cardiac Rhythm: sinus  Perfusion     [x]Adequate   [ ]Inadequate  Mentation   [x]Normal       [ ]Reduced  Extremities  [x]Warm         [ ]Cool  Volume Status [ ]Hypervolemic [x]Euvolemic [ ]Hypovolemic  Meds: midodrine. 20 milliGRAM(s) Oral three times a day  tamsulosin 0.4 milliGRAM(s) Oral at bedtime        GI/NUTRITION  Exam: soft, nontender, nondistended  Diet: NPO  Meds: lactulose Syrup 30 Gram(s) Oral every 6 hours  pantoprazole  Injectable 40 milliGRAM(s) IV Push every 12 hours      GENITOURINARY  I&O's Detail    06-28 @ 07:01  -  06-29 @ 07:00  --------------------------------------------------------  IN:    Oral Fluid: 2050 mL    Solution: 50 mL    Solution: 250 mL    Solution: 100 mL  Total IN: 2450 mL    OUT:    Stool: 1 mL    Voided: 2500 mL  Total OUT: 2501 mL    Total NET: -51 mL      06-29 @ 07:01 - 06-30 @ 05:22  --------------------------------------------------------  IN:    Oral Fluid: 360 mL    Packed Red Blood Cells: 350 mL  Total IN: 710 mL    OUT:    Voided: 3265 mL  Total OUT: 3265 mL    Total NET: -2555 mL          06-29    132<L>  |  100  |  51<H>  ----------------------------<  207<H>  5.5<H>   |  24  |  1.50<H>    Ca    9.8      29 Jun 2020 23:25  Phos  3.0     06-29  Mg     1.9     06-29    TPro  5.1<L>  /  Alb  3.1<L>  /  TBili  12.4<H>  /  DBili  2.7<H>  /  AST  23  /  ALT  17  /  AlkPhos  125<H>  06-29    [ ] Beasley catheter, indication: N/A  Meds: folic acid 1 milliGRAM(s) Oral daily        HEMATOLOGIC  Meds:   [x] VTE Prophylaxis                        7.1    2.80  )-----------( 16       ( 30 Jun 2020 02:32 )             21.1     PT/INR - ( 29 Jun 2020 23:25 )   PT: 32.7 sec;   INR: 3.02 ratio         PTT - ( 29 Jun 2020 23:25 )  PTT:55.4 sec  Transfusion     [ ] PRBC   [ ] Platelets   [ ] FFP   [ ] Cryoprecipitate      INFECTIOUS DISEASES  WBC Count: 2.80 K/uL (06-30 @ 02:32)  WBC Count: 2.39 K/uL (06-29 @ 22:02)    RECENT CULTURES:    Meds: epoetin barry-epbx (RETACRIT) Injectable 17816 Unit(s) SubCutaneous <User Schedule>  hepatitis B Vaccine - Adult (ENGERIX-B) 20 MICROGram(s) IntraMuscular once  nystatin    Suspension 528579 Unit(s) Oral four times a day  rifAXIMin 550 milliGRAM(s) Oral two times a day  trimethoprim  160 mG/sulfamethoxazole 800 mG 1 Tablet(s) Oral daily        ENDOCRINE  CAPILLARY BLOOD GLUCOSE      POCT Blood Glucose.: 225 mg/dL (29 Jun 2020 23:19)  POCT Blood Glucose.: 220 mg/dL (29 Jun 2020 21:25)  POCT Blood Glucose.: 216 mg/dL (29 Jun 2020 18:21)  POCT Blood Glucose.: 186 mg/dL (29 Jun 2020 17:16)  POCT Blood Glucose.: 155 mg/dL (29 Jun 2020 12:10)  POCT Blood Glucose.: 117 mg/dL (29 Jun 2020 08:15)    Meds: insulin glargine Injectable (LANTUS) 9 Unit(s) SubCutaneous at bedtime  insulin lispro (HumaLOG) corrective regimen sliding scale   SubCutaneous three times a day before meals  insulin lispro (HumaLOG) corrective regimen sliding scale   SubCutaneous at bedtime  insulin lispro Injectable (HumaLOG) 3 Unit(s) SubCutaneous three times a day before meals  octreotide  Injectable 100 MICROGram(s) SubCutaneous every 8 hours        ACCESS DEVICES:  [ ] Peripheral IV  [ ] Central Venous Line	[ ] R	[ ] L	[ ] IJ	[ ] Fem	[ ] SC	Placed:   [ ] Arterial Line		[ ] R	[ ] L	[ ] Fem	[ ] Rad	[ ] Ax	Placed:   [ ] PICC:					[ ] Mediport  [ ] Urinary Catheter, Date Placed:   [x] Necessity of urinary, arterial, and venous catheters discussed    OTHER MEDICATIONS:  chlorhexidine 2% Cloths 1 Application(s) Topical <User Schedule>  nystatin Cream 1 Application(s) Topical two times a day      CODE STATUS:      IMAGING:

## 2020-06-30 NOTE — PROGRESS NOTE ADULT - ATTENDING COMMENTS
Hepatology Staff: Yomi Medina MD    I saw and examined the patient along with  Dr. Dominguez 06-30-20 @ 11:05  Patient Medical Record, hosptial course was reviewed and summarized as below:    Vitals: Vital Signs Last 24 Hrs  T(C): 36.7 (30 Jun 2020 11:00), Max: 37.4 (29 Jun 2020 22:23)  T(F): 98.1 (30 Jun 2020 11:00), Max: 99.4 (29 Jun 2020 22:23)  HR: 75 (30 Jun 2020 11:00) (71 - 80)  BP: 106/53 (30 Jun 2020 11:00) (104/76 - 139/56)  BP(mean): 76 (30 Jun 2020 11:00) (76 - 86)  RR: 10 (30 Jun 2020 11:00) (8 - 21)  SpO2: 100% (30 Jun 2020 11:00) (93% - 100%)    Labs:Creatinine, Serum: 1.43 mg/dL (06-30-20 @ 05:17)  Bilirubin Total, Serum: 13.0 mg/dL (06-30-20 @ 05:17)  INR: 2.66 ratio (06-30-20 @ 05:17)  Bilirubin Total, Serum: 12.4 mg/dL (06-29-20 @ 23:25)  Creatinine, Serum: 1.50 mg/dL (06-29-20 @ 23:25)  INR: 3.02 ratio (06-29-20 @ 23:25)      Imaging Studies:  I/O: I&O's Summary    29 Jun 2020 07:01  -  30 Jun 2020 07:00  --------------------------------------------------------  IN: 720 mL / OUT: 4290 mL / NET: -3570 mL    30 Jun 2020 07:01  -  30 Jun 2020 11:05  --------------------------------------------------------  IN: 40 mL / OUT: 150 mL / NET: -110 mL      Nutritional Status:   Albumin, Serum: 3.3 g/dL (06-30-20 @ 05:17)  Albumin, Serum: 3.1 g/dL (06-29-20 @ 23:25)    Last 24 hour events: Noted recurrent GI bleeding-melena.    Recommendations: Will plan for EGD tomorrow with potential plan for APC. Monitor h/h, and transfuse as needed.      Plan discussed with Primary team.

## 2020-06-30 NOTE — PROGRESS NOTE ADULT - ASSESSMENT
65 y/o gentleman with IDDM, dyslipidemia, obesity, GERD, HFpEF with mild LV diastolic dysfunction, MGUS, chronic anemia with a history of duodenal ulcer as well as GAVE and duodenal AVM s/p APC (last on 10/11/19), 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 Admitted with worsening HE, UTI/VRE, MISA/Hyponatremia, UGI bleed.     [] VRE UTI  - 6/14: UCx VRE. S/P Linezolid 6/15-22. Continue PPX Bactrim  - 6/21 BCx and UCx negative to date  - Appreciate ID recommendations    [] UGI bleed  - Dropped Hct overnight. 2 bloody BMs.  Transfused 2 U PRBC  - Serial CBC. Transfuse for Hgb <7  - Continue Octreotide drip  - s/p EGD (6/22) c/w hemorrhagic duodenitis/gastritis, Grade 2EV, GAVE  - Has received total 10u PRBC since admission  - cont Protonix 40mg IV bid  - Neupogen x1 today      [] JEAN Cirrhosis  - UNOS Listed for OLT with MELD 32  - MELD NA today 31  - cleared by ID   - HE: cont rifaximin/lactulose  - Abd US duplex to assess portal vein: patent vessels   - Epogen 3xweek   - Trend CBC, CMP, ammonia, coags  - Appreciate Hepatology recommendations  - cont SICU care      Please page [phone] with any questions

## 2020-06-30 NOTE — PROGRESS NOTE ADULT - SUBJECTIVE AND OBJECTIVE BOX
Transplant Surgery    Present:  Patient seen with Transplant Surgeons Dr. Gray, Dr. Chris, Hepatologist Dr.Nitzan Mo, Nephrologist Dr. DAE Patel and NP Xiao Hurley and PA Sebastian Mcgill during am rounds and examined with Dr. Gray. Disciplines not in attendance will be notified of the plan.     HPI:  63 y/o gentleman with IDDM, dyslipidemia, obesity, GERD, HFpEF with mild LV diastolic dysfunction, MGUS, hronic anemia with a history of duodenal ulcer as well as GAVE and duodenal AVM s/p APC (last on 10/11/19), 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 UNOS listed [facility] liver [facility] at [facility]. 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     Re-admitted with:   ·	worsening HE  ·	UTI/VRE: tx with linezolid 6/15-22, now on bactrim DS 6/22 -   ·	MISA/Hyponatremia ()   ·	UGI bleed (melena) s/p EGD (6/22) c/w hemorrhagic duodenitis/gastritis; Grade 2 EV; requiring PRBC 1u(6/17), 2u (6/21), 1u (6/23), 1u (6/24)  ·	Known h/o R foot ulcer (MRI neg for OM)    Interval Events:   - Transferred back to SICU overnight  2/2  two large melanotic stools and Drop in H/H to 6.4/19.3 from 7.3/21.0  PLT 15.  Received 2 PRBC   Repeat H/H 7.9/23.0  INR 2.66  - A&Ox 3 at present with periods of confusion, + asterixis    Ammonia level 52    MELD Na Score 31  - Received Lasix 60mg x1 for K+ 5.5 -> 5.2      MEDICATIONS  (STANDING):  chlorhexidine 2% Cloths 1 Application(s) Topical <User Schedule>  epoetin barry-epbx (RETACRIT) Injectable 69663 Unit(s) SubCutaneous <User Schedule>  folic acid 1 milliGRAM(s) Oral daily  hepatitis B Vaccine - Adult (ENGERIX-B) 20 MICROGram(s) IntraMuscular once  insulin glargine Injectable (LANTUS) 9 Unit(s) SubCutaneous at bedtime  insulin lispro (HumaLOG) corrective regimen sliding scale   SubCutaneous three times a day before meals  insulin lispro (HumaLOG) corrective regimen sliding scale   SubCutaneous at bedtime  insulin lispro Injectable (HumaLOG) 3 Unit(s) SubCutaneous three times a day before meals  lactulose Syrup 30 Gram(s) Oral every 6 hours  midodrine. 20 milliGRAM(s) Oral three times a day  nystatin    Suspension 510581 Unit(s) Oral four times a day  nystatin Cream 1 Application(s) Topical two times a day  octreotide  Infusion 50 MICROgram(s)/Hr (10 mL/Hr) IV Continuous <Continuous>  ondansetron Injectable 4 milliGRAM(s) IV Push every 8 hours  pantoprazole  Injectable 40 milliGRAM(s) IV Push every 12 hours  rifAXIMin 550 milliGRAM(s) Oral two times a day  tamsulosin 0.4 milliGRAM(s) Oral at bedtime  trimethoprim  160 mG/sulfamethoxazole 800 mG 1 Tablet(s) Oral daily    MEDICATIONS  (PRN):      PAST MEDICAL & SURGICAL HISTORY:  GIB (gastrointestinal bleeding)  GERD with esophagitis: Gastritis &amp; Non Bleeding Ulcers  Hepatic encephalopathy  Obesity  Fatty liver disease, nonalcoholic  Renal stones: 25 years ago  Hypertension  Neuropathy  Hypercholesteremia  Diabetes  S/P cholecystectomy      Vital Signs Last 24 Hrs  T(C): 36.9 (30 Jun 2020 07:00), Max: 37.4 (29 Jun 2020 22:23)  T(F): 98.4 (30 Jun 2020 07:00), Max: 99.4 (29 Jun 2020 22:23)  HR: 71 (30 Jun 2020 10:00) (71 - 150)  BP: 104/76 (30 Jun 2020 10:00) (104/76 - 139/56)  BP(mean): 86 (30 Jun 2020 10:00) (76 - 86)  RR: 21 (30 Jun 2020 10:00) (8 - 21)  SpO2: 99% (30 Jun 2020 10:00) (93% - 100%)    I&O's Summary    29 Jun 2020 07:01  -  30 Jun 2020 07:00  --------------------------------------------------------  IN: 720 mL / OUT: 4290 mL / NET: -3570 mL    30 Jun 2020 07:01  -  30 Jun 2020 10:22  --------------------------------------------------------  IN: 40 mL / OUT: 150 mL / NET: -110 mL                              7.9    2.68  )-----------( 15       ( 30 Jun 2020 05:17 )             23.0     06-30    133<L>  |  101  |  47<H>  ----------------------------<  180<H>  5.2   |  23  |  1.43<H>    Ca    10.0      30 Jun 2020 05:17  Phos  3.1     06-30  Mg     1.7     06-30    TPro  5.1<L>  /  Alb  3.3  /  TBili  13.0<H>  /  DBili  2.7<H>  /  AST  29  /  ALT  16  /  AlkPhos  137<H>  06-30          Culture - Blood (collected 06-24-20 @ 14:09)  Source: .Blood Blood-Peripheral  Final Report (06-29-20 @ 15:00):    No Growth Final    Culture - Blood (collected 06-24-20 @ 14:09)  Source: .Blood Blood  Final Report (06-29-20 @ 15:00):    No Growth Final        Review of systems  Gen: No weight changes, fatigue, fevers/chills, weakness  Skin: Jaundice, +R heel ulcer  Head/Eyes/Ears/Mouth: No headache; Normal hearing; Normal vision w/o blurriness; No sinus pain/discomfort.    Respiratory: No dyspnea, cough, wheezing, hemoptysis  CV: No chest pain, PND, orthopnea  GI: denies diarrhea, constipation, nausea, vomiting, melena, hematochezia  : No increased frequency, dysuria, hematuria, nocturia  MSK: No joint pain/swelling; no back pain; no edema  Neuro: No dizziness/lightheadedness, weakness, seizures, numbness, tingling  Heme: No easy bruising or bleeding  Endo: No heat/cold intolerance  Psych: No significant nervousness, anxiety, stress, depression  All other systems were reviewed and are negative, except as noted.      PHYSICAL EXAM:  Constitutional: Well developed / well nourished  Eyes: +Icteric, PERRLA  ENMT: nc/at  Neck: supple  Respiratory: CTA B/L  Cardiovascular: RRR  Abdominal:  soft, NT, ND  Genitourinary: Voiding spontaneously  Extremities: R heel wound w/ overlying eschar w/ no signs of infection.  2+ BLE edema  Vascular: Palpable dp pulses bilaterally  Neurological: A&O x3 on exam with periods of confusion, + asterixis noted  Skin: R heel breakdown clean, covered with DSD.  No rashes or lesions.  B/L buttocks incontinent related dermatitis with no broken skin. Jaundiced  Musculoskeletal: Moving all extremities  Psychiatric: Responsive

## 2020-06-30 NOTE — PROGRESS NOTE ADULT - ASSESSMENT
63 y/o male w/ a PMHx of HTN, HLD, DM type II, HFpEF (EF 70%), and cirrhosis secondary to JEAN c/b small esophageal varices, portal hypertensive gastropathy, ascites, SBP, hepatic encephalopathy, GAVE syndrome s/p APC, duodenal ulcer (5/2019), recent ESBL E coli prostate abscess (3/2020 s/p 4 weeks of ertapenem), and orthostatic hypotension (on midodrine) presenting with decompensated cirrhosis w/ a hospital course c/b MISA, hyponatremia, VRE UTI, acute blood loss anemia from GAVE syndrome, and hepatic encephalopathy    PLAN:    Neuro: hepatic encephalopathy  - Monitor mental status  - Rifaximin and lactulose for hepatic encephalopathy    Resp: no acute issues  - Monitor pulse oximeter  - Out of bed to chair, ambulate as tolerated, and incentive spirometry to prevent atelectasis    CV: HFpEF of ~70%, hypotension  - Monitor vital signs  - Midodrine 20 mg q8hrs for orthostatic hypotension  - Lasix 40 mg PO daily for HFpEF  - TTE from 6/15 demonstrates hyperdynamic LV systolic function w/ EF ~70% and grossly normal RV systolic function    GI: decompensated cirrhosis, GAVE syndrome  - NPO due to active GI bleed  - Consider GI consultation in AM for repeat endoscopy  - Octreotide infusion and Protonix 40 mg IV BID for GAVE syndrome    Renal: urinary retention, hyponatremia  - Monitor I&Os  - Monitor electrolytes and replete as necessary  - Flomax for BPH    Heme: coagulopathy  - Trend H/H, transfuse prn for Hgb < 7.0  - Epogen for anemia as per nephrology  - Venodynes for mechanical VTE prophylaxis, holding chemical VTE prophylaxis in the setting of coagulopathy/GIB    ID: chronic UTI  - Monitor for clinical evidence of active infection  - Bactrim for chronic UTI prophylaxis  - Nystatin for thrush prophylaxis    Endo: DM type II, HLD  - Monitor glucose before meals and at bedtime  - Lantus 9 units, Humalog 3 units pre-meal, and ISS for glycemic control    Disposition:  - Full code  - SICU

## 2020-06-30 NOTE — PROGRESS NOTE ADULT - ATTENDING COMMENTS
SICU attending  Patient seen and examined in SICU  Transferred to floor less than 24 hours ago  Now with signs of bleeding (decreasing hematocrit in the setting of liver induced coagulapathy), but hemodynamically stable  Brought to SICU urgently for resuscitation  Given 2 units of PRBCs with reasonable response in hematocrit  Will continue to closely monitor with serial CBCs

## 2020-07-01 ENCOUNTER — TRANSCRIPTION ENCOUNTER (OUTPATIENT)
Age: 65
End: 2020-07-01

## 2020-07-01 LAB
ALBUMIN SERPL ELPH-MCNC: 3.2 G/DL — LOW (ref 3.3–5)
ALP SERPL-CCNC: 83 U/L — SIGNIFICANT CHANGE UP (ref 40–120)
ALT FLD-CCNC: 15 U/L — SIGNIFICANT CHANGE UP (ref 10–45)
AMMONIA BLD-MCNC: 35 UMOL/L — SIGNIFICANT CHANGE UP (ref 11–55)
ANION GAP SERPL CALC-SCNC: 11 MMOL/L — SIGNIFICANT CHANGE UP (ref 5–17)
ANION GAP SERPL CALC-SCNC: 9 MMOL/L — SIGNIFICANT CHANGE UP (ref 5–17)
APTT BLD: 52.8 SEC — HIGH (ref 27.5–35.5)
AST SERPL-CCNC: 29 U/L — SIGNIFICANT CHANGE UP (ref 10–40)
BILIRUB DIRECT SERPL-MCNC: 3.6 MG/DL — HIGH (ref 0–0.2)
BILIRUB INDIRECT FLD-MCNC: 12.3 MG/DL — HIGH (ref 0.2–1)
BILIRUB SERPL-MCNC: 15.9 MG/DL — HIGH (ref 0.2–1.2)
BUN SERPL-MCNC: 52 MG/DL — HIGH (ref 7–23)
BUN SERPL-MCNC: 55 MG/DL — HIGH (ref 7–23)
CALCIUM SERPL-MCNC: 10.2 MG/DL — SIGNIFICANT CHANGE UP (ref 8.4–10.5)
CALCIUM SERPL-MCNC: 10.3 MG/DL — SIGNIFICANT CHANGE UP (ref 8.4–10.5)
CHLORIDE SERPL-SCNC: 101 MMOL/L — SIGNIFICANT CHANGE UP (ref 96–108)
CHLORIDE SERPL-SCNC: 99 MMOL/L — SIGNIFICANT CHANGE UP (ref 96–108)
CO2 SERPL-SCNC: 23 MMOL/L — SIGNIFICANT CHANGE UP (ref 22–31)
CO2 SERPL-SCNC: 24 MMOL/L — SIGNIFICANT CHANGE UP (ref 22–31)
CREAT SERPL-MCNC: 1.3 MG/DL — SIGNIFICANT CHANGE UP (ref 0.5–1.3)
CREAT SERPL-MCNC: 1.33 MG/DL — HIGH (ref 0.5–1.3)
DAT C3-SP REAG RBC QL: NEGATIVE — SIGNIFICANT CHANGE UP
DAT POLY-SP REAG RBC QL: POSITIVE — SIGNIFICANT CHANGE UP
DIRECT COOMBS IGG: POSITIVE — SIGNIFICANT CHANGE UP
ELUATE ANTIBODY 1: SIGNIFICANT CHANGE UP
FIBRINOGEN PPP-MCNC: 100 MG/DL — CRITICAL LOW (ref 350–510)
GLUCOSE BLDC GLUCOMTR-MCNC: 292 MG/DL — HIGH (ref 70–99)
GLUCOSE BLDC GLUCOMTR-MCNC: 307 MG/DL — HIGH (ref 70–99)
GLUCOSE SERPL-MCNC: 101 MG/DL — HIGH (ref 70–99)
GLUCOSE SERPL-MCNC: 105 MG/DL — HIGH (ref 70–99)
HCT VFR BLD CALC: 19.4 % — CRITICAL LOW (ref 39–50)
HCT VFR BLD CALC: 22.9 % — LOW (ref 39–50)
HEPARINASE TEG R TIME: 8.2 MIN — SIGNIFICANT CHANGE UP (ref 4.3–8.3)
HGB BLD-MCNC: 6.8 G/DL — CRITICAL LOW (ref 13–17)
HGB BLD-MCNC: 7.9 G/DL — LOW (ref 13–17)
INR BLD: 2.99 RATIO — HIGH (ref 0.88–1.16)
MAGNESIUM SERPL-MCNC: 1.5 MG/DL — LOW (ref 1.6–2.6)
MAGNESIUM SERPL-MCNC: 2 MG/DL — SIGNIFICANT CHANGE UP (ref 1.6–2.6)
MCHC RBC-ENTMCNC: 32.5 PG — SIGNIFICANT CHANGE UP (ref 27–34)
MCHC RBC-ENTMCNC: 32.5 PG — SIGNIFICANT CHANGE UP (ref 27–34)
MCHC RBC-ENTMCNC: 34.5 GM/DL — SIGNIFICANT CHANGE UP (ref 32–36)
MCHC RBC-ENTMCNC: 35.1 GM/DL — SIGNIFICANT CHANGE UP (ref 32–36)
MCV RBC AUTO: 92.8 FL — SIGNIFICANT CHANGE UP (ref 80–100)
MCV RBC AUTO: 94.2 FL — SIGNIFICANT CHANGE UP (ref 80–100)
NRBC # BLD: 0 /100 WBCS — SIGNIFICANT CHANGE UP (ref 0–0)
NRBC # BLD: 0 /100 WBCS — SIGNIFICANT CHANGE UP (ref 0–0)
PHOSPHATE SERPL-MCNC: 2.8 MG/DL — SIGNIFICANT CHANGE UP (ref 2.5–4.5)
PHOSPHATE SERPL-MCNC: 2.9 MG/DL — SIGNIFICANT CHANGE UP (ref 2.5–4.5)
PLATELET # BLD AUTO: 16 K/UL — CRITICAL LOW (ref 150–400)
PLATELET # BLD AUTO: 18 K/UL — CRITICAL LOW (ref 150–400)
PLATELET MAPPING ACTF MAX AMPLITUDE: SIGNIFICANT CHANGE UP MM (ref 2–19)
PLATELET MAPPING ADP MAXIMUM AMPLITUDE: <10 MM (ref 45–69)
PLATELET MAPPING ADP PERCENT INHIBITION: SIGNIFICANT CHANGE UP % (ref 0–17)
PLATELET MAPPING HKH MAXIMUM AMPLITUDE: <42 MM (ref 53–68)
POTASSIUM SERPL-MCNC: 5.4 MMOL/L — HIGH (ref 3.5–5.3)
POTASSIUM SERPL-MCNC: 5.5 MMOL/L — HIGH (ref 3.5–5.3)
POTASSIUM SERPL-SCNC: 5.4 MMOL/L — HIGH (ref 3.5–5.3)
POTASSIUM SERPL-SCNC: 5.5 MMOL/L — HIGH (ref 3.5–5.3)
PROT SERPL-MCNC: 4.7 G/DL — LOW (ref 6–8.3)
PROTHROM AB SERPL-ACNC: 32.6 SEC — HIGH (ref 10.6–13.6)
RAPIDTEG MAXIMUM AMPLITUDE: <40 MM (ref 52–70)
RBC # BLD: 2.09 M/UL — LOW (ref 4.2–5.8)
RBC # BLD: 2.43 M/UL — LOW (ref 4.2–5.8)
RBC # FLD: 17.7 % — HIGH (ref 10.3–14.5)
RBC # FLD: 18.2 % — HIGH (ref 10.3–14.5)
SARS-COV-2 RNA SPEC QL NAA+PROBE: SIGNIFICANT CHANGE UP
SODIUM SERPL-SCNC: 133 MMOL/L — LOW (ref 135–145)
SODIUM SERPL-SCNC: 134 MMOL/L — LOW (ref 135–145)
TEG FUNCTIONAL FIBRINOGEN: SIGNIFICANT CHANGE UP MM (ref 15–32)
TEG MAXIMUM AMPLITUDE: <40 MM (ref 52–69)
TEG REACTION TIME: 8.4 MIN — SIGNIFICANT CHANGE UP (ref 4.6–9.1)
WBC # BLD: 12.15 K/UL — HIGH (ref 3.8–10.5)
WBC # BLD: 15.32 K/UL — HIGH (ref 3.8–10.5)
WBC # FLD AUTO: 12.15 K/UL — HIGH (ref 3.8–10.5)
WBC # FLD AUTO: 15.32 K/UL — HIGH (ref 3.8–10.5)

## 2020-07-01 PROCEDURE — 99232 SBSQ HOSP IP/OBS MODERATE 35: CPT

## 2020-07-01 PROCEDURE — 36620 INSERTION CATHETER ARTERY: CPT

## 2020-07-01 PROCEDURE — 99223 1ST HOSP IP/OBS HIGH 75: CPT

## 2020-07-01 PROCEDURE — 76937 US GUIDE VASCULAR ACCESS: CPT | Mod: 26

## 2020-07-01 RX ORDER — MEROPENEM 1 G/30ML
500 INJECTION INTRAVENOUS ONCE
Refills: 0 | Status: DISCONTINUED | OUTPATIENT
Start: 2020-07-02 | End: 2020-07-02

## 2020-07-01 RX ORDER — INSULIN LISPRO 100/ML
VIAL (ML) SUBCUTANEOUS
Refills: 0 | Status: DISCONTINUED | OUTPATIENT
Start: 2020-07-01 | End: 2020-07-02

## 2020-07-01 RX ORDER — DAPTOMYCIN 500 MG/10ML
600 INJECTION, POWDER, LYOPHILIZED, FOR SOLUTION INTRAVENOUS EVERY 24 HOURS
Refills: 0 | Status: DISCONTINUED | OUTPATIENT
Start: 2020-07-01 | End: 2020-07-02

## 2020-07-01 RX ORDER — MAGNESIUM SULFATE 500 MG/ML
2 VIAL (ML) INJECTION ONCE
Refills: 0 | Status: COMPLETED | OUTPATIENT
Start: 2020-07-01 | End: 2020-07-01

## 2020-07-01 RX ADMIN — ONDANSETRON 4 MILLIGRAM(S): 8 TABLET, FILM COATED ORAL at 21:26

## 2020-07-01 RX ADMIN — Medication 1 TABLET(S): at 11:40

## 2020-07-01 RX ADMIN — CHLORHEXIDINE GLUCONATE 1 APPLICATION(S): 213 SOLUTION TOPICAL at 06:19

## 2020-07-01 RX ADMIN — Medication 500 MILLIGRAM(S): at 06:30

## 2020-07-01 RX ADMIN — Medication 500000 UNIT(S): at 06:30

## 2020-07-01 RX ADMIN — ONDANSETRON 4 MILLIGRAM(S): 8 TABLET, FILM COATED ORAL at 06:30

## 2020-07-01 RX ADMIN — PANTOPRAZOLE SODIUM 40 MILLIGRAM(S): 20 TABLET, DELAYED RELEASE ORAL at 17:22

## 2020-07-01 RX ADMIN — NYSTATIN CREAM 1 APPLICATION(S): 100000 CREAM TOPICAL at 06:30

## 2020-07-01 RX ADMIN — Medication 3: at 22:36

## 2020-07-01 RX ADMIN — ERYTHROPOIETIN 10000 UNIT(S): 10000 INJECTION, SOLUTION INTRAVENOUS; SUBCUTANEOUS at 14:38

## 2020-07-01 RX ADMIN — TAMSULOSIN HYDROCHLORIDE 0.4 MILLIGRAM(S): 0.4 CAPSULE ORAL at 21:26

## 2020-07-01 RX ADMIN — Medication 500000 UNIT(S): at 11:40

## 2020-07-01 RX ADMIN — OCTREOTIDE ACETATE 10 MICROGRAM(S)/HR: 200 INJECTION, SOLUTION INTRAVENOUS; SUBCUTANEOUS at 02:59

## 2020-07-01 RX ADMIN — NYSTATIN CREAM 1 APPLICATION(S): 100000 CREAM TOPICAL at 17:22

## 2020-07-01 RX ADMIN — Medication 500000 UNIT(S): at 17:22

## 2020-07-01 RX ADMIN — MIDODRINE HYDROCHLORIDE 20 MILLIGRAM(S): 2.5 TABLET ORAL at 17:22

## 2020-07-01 RX ADMIN — PANTOPRAZOLE SODIUM 40 MILLIGRAM(S): 20 TABLET, DELAYED RELEASE ORAL at 06:30

## 2020-07-01 RX ADMIN — MIDODRINE HYDROCHLORIDE 20 MILLIGRAM(S): 2.5 TABLET ORAL at 06:30

## 2020-07-01 RX ADMIN — ONDANSETRON 4 MILLIGRAM(S): 8 TABLET, FILM COATED ORAL at 14:21

## 2020-07-01 RX ADMIN — Medication 50 GRAM(S): at 02:59

## 2020-07-01 RX ADMIN — MIDODRINE HYDROCHLORIDE 20 MILLIGRAM(S): 2.5 TABLET ORAL at 11:40

## 2020-07-01 RX ADMIN — Medication 1 MILLIGRAM(S): at 11:40

## 2020-07-01 RX ADMIN — Medication 62.5 MILLIMOLE(S): at 04:01

## 2020-07-01 NOTE — PROGRESS NOTE ADULT - ASSESSMENT
ASSESSMENT:  65 y/o male w/ a PMHx of HTN, HLD, DM type II, HFpEF (EF 70%), and cirrhosis secondary to JEAN c/b small esophageal varices, portal hypertensive gastropathy, ascites, SBP, hepatic encephalopathy, GAVE syndrome s/p APC, duodenal ulcer (5/2019), recent ESBL E coli prostate abscess (3/2020 s/p 4 weeks of ertapenem), and orthostatic hypotension (on midodrine) presenting with decompensated cirrhosis w/ a hospital course c/b MISA, hyponatremia, VRE UTI, acute blood loss anemia from GAVE syndrome, and hepatic encephalopathy    PLAN:  Neurologic: hepatic encephalopathy  -monitor mental status  -rifaxamin and lactulose for hepatic encephalopathy  -currently denies pain    Respiratory: no acute issues  -continuous pulse oximetry  -encourage IS, OOBTC    Cardiovascular: orthostatic hypotension  -currently hemodynamically stable  -on home midodrine 20mg TID  -monitor vital signs    Gastrointestinal/Nutrition: GIB, JEAN cirrhosis, GAVE syndrome  -NPO due to GIB  -Octreotide infusion and Protonix 40 mg IV BID for GAVE syndrome    Renal: urinary retention, hyponatremia  - Monitor I&Os  - Monitor electrolytes and replete as necessary  - Flomax for BPH    Heme: coagulopathy  - Trend H/H, transfuse prn for Hgb < 7.0  - Epogen for anemia as per nephrology  - Venodynes for mechanical VTE prophylaxis, holding chemical VTE prophylaxis in the setting of coagulopathy/GIB    Infectious Disease: chronic UTI, H pylori  -c/w Bactrim for chronic UTI ppx  -c/w clarithromycin and flagyl for H pylori  -nystatin for thrush ppx    Endo: DM type II, HLD  -holding insulin while NPO, FS q6hr    Disposition:  SICU  Full code

## 2020-07-01 NOTE — PROGRESS NOTE ADULT - SUBJECTIVE AND OBJECTIVE BOX
Transplant Surgery    Present: Patient seen with multidisciplinary team including Transplant Surgeon: Dr. Alonzo, Dr. Gray.  Transplant Hepatologist Dr. Letitia Mo, Dr. Yomi Medina, hepatology fellow. Transplant Nephrologist: Dr. FAY Patel. renal fellow  Pharmacist: Ancelmo Blanca. NP: Patrick, and ICU team resident and RN during am rounds and examined with Dr. Alonzo.    Disciplines not in attendance will be notified of the plan.     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 UNOS listed for liver transplantation at [facility]. 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     Re-admitted with:   ·	worsening HE  ·	UTI/VRE: tx with linezolid 6/15-22, now on bactrim DS 6/22 -   ·	MISA/Hyponatremia ()   ·	UGI bleed (melena) s/p EGD (6/22) c/w hemorrhagic duodenitis/gastritis; Grade 2 EV; requiring PRBC 1u(6/17), 2u (6/21), 1u (6/23), 1u (6/24)  ·	Known h/o R foot ulcer (MRI neg for OM)    Interval Events:   -  One melanotic stools overnight, S/P 1 PRBC yesterday, today H/H 7.9/22.90  INR 2.99 up from 2.66  - A&Ox 3 at present with periods of confusion, + asterixis    Ammonia level 35    MELD Na Score 33  - NPO for repeat EGD  - Received one dose of Neupogen and on Epogen 3xw  - PO octereotide changed to octereotide drip         MEDICATIONS  (STANDING):  chlorhexidine 2% Cloths 1 Application(s) Topical <User Schedule>  epoetin barry-epbx (RETACRIT) Injectable 11572 Unit(s) SubCutaneous <User Schedule>  folic acid 1 milliGRAM(s) Oral daily  hepatitis B Vaccine - Adult (ENGERIX-B) 20 MICROGram(s) IntraMuscular once  lactulose Syrup 30 Gram(s) Oral every 6 hours  midodrine. 20 milliGRAM(s) Oral three times a day  nystatin    Suspension 577704 Unit(s) Oral four times a day  nystatin Cream 1 Application(s) Topical two times a day  octreotide  Infusion 50 MICROgram(s)/Hr (10 mL/Hr) IV Continuous <Continuous>  ondansetron Injectable 4 milliGRAM(s) IV Push every 8 hours  pantoprazole  Injectable 40 milliGRAM(s) IV Push every 12 hours  rifAXIMin 550 milliGRAM(s) Oral two times a day  tamsulosin 0.4 milliGRAM(s) Oral at bedtime  trimethoprim  160 mG/sulfamethoxazole 800 mG 1 Tablet(s) Oral daily    MEDICATIONS  (PRN):      PAST MEDICAL & SURGICAL HISTORY:  GIB (gastrointestinal bleeding)  GERD with esophagitis: Gastritis &amp; Non Bleeding Ulcers  Hepatic encephalopathy  Obesity  Fatty liver disease, nonalcoholic  Renal stones: 25 years ago  Hypertension  Neuropathy  Hypercholesteremia  Diabetes  S/P cholecystectomy      Vital Signs Last 24 Hrs  T(C): 36.9 (01 Jul 2020 11:00), Max: 36.9 (30 Jun 2020 23:00)  T(F): 98.4 (01 Jul 2020 11:00), Max: 98.4 (30 Jun 2020 23:00)  HR: 79 (01 Jul 2020 12:00) (74 - 85)  BP: 126/60 (01 Jul 2020 12:00) (99/50 - 131/61)  BP(mean): 86 (01 Jul 2020 12:00) (71 - 101)  RR: 19 (01 Jul 2020 12:00) (8 - 24)  SpO2: 98% (01 Jul 2020 12:00) (95% - 100%)    I&O's Summary    30 Jun 2020 07:01  -  01 Jul 2020 07:00  --------------------------------------------------------  IN: 1030 mL / OUT: 1625 mL / NET: -595 mL    01 Jul 2020 07:01  -  01 Jul 2020 12:42  --------------------------------------------------------  IN: 112.5 mL / OUT: 500 mL / NET: -387.5 mL                              7.9    15.32 )-----------( 16       ( 01 Jul 2020 01:19 )             22.9     07-01    133<L>  |  99  |  55<H>  ----------------------------<  105<H>  5.4<H>   |  23  |  1.33<H>    Ca    10.3      01 Jul 2020 05:21  Phos  2.9     07-01  Mg     2.0     07-01    TPro  4.7<L>  /  Alb  3.2<L>  /  TBili  15.9<H>  /  DBili  3.6<H>  /  AST  29  /  ALT  15  /  AlkPhos  83  07-01          Culture - Blood (collected 06-24-20 @ 14:09)  Source: .Blood Blood-Peripheral  Final Report (06-29-20 @ 15:00):    No Growth Final    Culture - Blood (collected 06-24-20 @ 14:09)  Source: .Blood Blood  Final Report (06-29-20 @ 15:00):    No Growth Final              Review of systems  Gen: No weight changes, fatigue, fevers/chills, weakness  Skin: Jaundice, +R heel ulcer  Head/Eyes/Ears/Mouth: No headache; Normal hearing; Normal vision w/o blurriness; No sinus pain/discomfort.    Respiratory: No dyspnea, cough, wheezing, hemoptysis  CV: No chest pain, PND, orthopnea  GI: denies diarrhea, constipation, nausea, vomiting, melena, hematochezia  : No increased frequency, dysuria, hematuria, nocturia  MSK: No joint pain/swelling; no back pain; no edema  Neuro: No dizziness/lightheadedness, weakness, seizures, numbness, tingling  Heme: No easy bruising or bleeding  Endo: No heat/cold intolerance  Psych: No significant nervousness, anxiety, stress, depression  All other systems were reviewed and are negative, except as noted.      PHYSICAL EXAM:  Constitutional: Well developed / well nourished  Eyes: +Icteric, PERRLA  ENMT: nc/at  Neck: supple  Respiratory: CTA B/L  Cardiovascular: RRR  Abdominal:  soft, NT, ND  Genitourinary: Voiding spontaneously  Extremities: R heel wound w/ overlying eschar w/ no signs of infection.  2+ BLE edema  Vascular: Palpable dp pulses bilaterally  Neurological: A&O x3 on exam with periods of confusion, + asterixis noted  Skin: R heel breakdown clean, covered with DSD.  No rashes or lesions.  B/L buttocks incontinent related dermatitis with no broken skin. Jaundiced  Musculoskeletal: Moving all extremities  Psychiatric: Responsive

## 2020-07-01 NOTE — PROGRESS NOTE ADULT - ATTENDING COMMENTS
Hepatology Staff: Yomi Medina MD    I saw and examined the patient along with  Dr. Dominguez 07-01-20 @ 12:30.    Patient Medical Record, hosptial course was reviewed and summarized as below:    Vitals: Vital Signs Last 24 Hrs  T(C): 36.9 (01 Jul 2020 11:00), Max: 36.9 (30 Jun 2020 23:00)  T(F): 98.4 (01 Jul 2020 11:00), Max: 98.4 (30 Jun 2020 23:00)  HR: 79 (01 Jul 2020 12:00) (74 - 85)  BP: 126/60 (01 Jul 2020 12:00) (99/50 - 131/61)  BP(mean): 86 (01 Jul 2020 12:00) (71 - 101)  RR: 19 (01 Jul 2020 12:00) (8 - 24)  SpO2: 98% (01 Jul 2020 12:00) (95% - 100%)    Labs:Creatinine, Serum: 1.33 mg/dL (07-01-20 @ 05:21)  Creatinine, Serum: 1.30 mg/dL (07-01-20 @ 01:19)  Bilirubin Total, Serum: 15.9 mg/dL (07-01-20 @ 01:19)  INR: 2.99 ratio (07-01-20 @ 01:19)      I/O: I&O's Summary    30 Jun 2020 07:01  -  01 Jul 2020 07:00  --------------------------------------------------------  IN: 1030 mL / OUT: 1625 mL / NET: -595 mL    01 Jul 2020 07:01  -  01 Jul 2020 12:30  --------------------------------------------------------  IN: 112.5 mL / OUT: 500 mL / NET: -387.5 mL      Nutritional Status:   Albumin, Serum: 3.2 g/dL (07-01-20 @ 01:19)    Last 24 hour events: One episode melena- mild drop in H/H. Received 1 unit PRBC- responded appropriately.     Recommendations: Will hold up EGD. MELD score 33. Patient is close to receiving an offer. We will monitor course. Keep on IV Octreotide, and IV PPI.  Family updated about his condition.      Plan discussed with Primary team.

## 2020-07-01 NOTE — PROGRESS NOTE ADULT - ASSESSMENT
65 y/o gentleman with IDDM, dyslipidemia, obesity, GERD, HFpEF with mild LV diastolic dysfunction, MGUS, chronic anemia with a history of duodenal ulcer as well as GAVE and duodenal AVM s/p APC (last on 10/11/19), 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 Admitted with worsening HE, UTI/VRE, MISA/Hyponatremia, UGI bleed.     [] VRE UTI  - 6/14: UCx VRE. S/P Linezolid 6/15-22. Continue PPX Bactrim  - 6/21 BCx and UCx negative to date  - Appreciate ID recommendations  - ID: when preop for transplant recommends to give Daptomycin and meropenem    [] UGI bleed  - Overnight. 1 bloody BM, H&H 7.9/22.9  - Serial CBC. Transfuse for Hgb <7  - Continue Octreotide drip  - s/p EGD (6/22) c/w hemorrhagic duodenitis/gastritis, Grade 2EV, GAVE  - EGD for today on hold for now will f/u with hepatology  - Has received total 11u PRBC since admission  - cont Protonix 40mg IV bid  - Plate low 16-Recommend to give one unit plate today  - DC antibiotics no need for Hpylori per hepatology  - PO diet as keerthi      [] JEAN Cirrhosis  - UNOS Listed for OLT with MELD 32  - MELD NA today 33  - cleared by ID   - HE: cont rifaximin/lactulose  - Abd US duplex to assess portal vein: patent vessels   - Epogen 3xweek   - Trend CBC, CMP, ammonia, coags  - Please send COVID swab  - Will need A-line prior to OR  - Appreciate Hepatology recommendations  - cont SICU care      Please page [phone] with any questions

## 2020-07-01 NOTE — PROGRESS NOTE ADULT - SUBJECTIVE AND OBJECTIVE BOX
Chief Complaint:  Patient is a 64y old  Male who presents with a chief complaint of Liver failure, hepatic encephalopathy (2020 01:11)      Reason for consult: cirrhosis    Interval Events: Pt had melena last night, Hb > 7. Awaiting liver transplant.    Hospital Medications:  chlorhexidine 2% Cloths 1 Application(s) Topical <User Schedule>  epoetin barry-epbx (RETACRIT) Injectable 35434 Unit(s) SubCutaneous <User Schedule>  folic acid 1 milliGRAM(s) Oral daily  hepatitis B Vaccine - Adult (ENGERIX-B) 20 MICROGram(s) IntraMuscular once  lactulose Syrup 30 Gram(s) Oral every 6 hours  midodrine. 20 milliGRAM(s) Oral three times a day  nystatin    Suspension 153848 Unit(s) Oral four times a day  nystatin Cream 1 Application(s) Topical two times a day  octreotide  Infusion 50 MICROgram(s)/Hr IV Continuous <Continuous>  ondansetron Injectable 4 milliGRAM(s) IV Push every 8 hours  pantoprazole  Injectable 40 milliGRAM(s) IV Push every 12 hours  rifAXIMin 550 milliGRAM(s) Oral two times a day  tamsulosin 0.4 milliGRAM(s) Oral at bedtime  trimethoprim  160 mG/sulfamethoxazole 800 mG 1 Tablet(s) Oral daily      ROS:   General:  No  fevers, chills, night sweats, fatigue  Eyes:  Good vision, no reported pain  ENT:  No sore throat, pain, runny nose  CV:  No pain, palpitations  Pulm:  No dyspnea, cough  GI:  See HPI, otherwise negative  :  No  incontinence, nocturia  Muscle:  No pain, weakness  Neuro:  No memory problems  Psych:  No insomnia, mood problems, depression  Endocrine:  No polyuria, polydipsia, cold/heat intolerance  Heme:  No petechiae, ecchymosis, easy bruisability  Skin:  No rash    PHYSICAL EXAM:   Vital Signs:  Vital Signs Last 24 Hrs  T(C): 36.8 (2020 07:00), Max: 36.9 (2020 23:00)  T(F): 98.2 (2020 07:00), Max: 98.4 (2020 23:00)  HR: 77 (2020 09:00) (71 - 85)  BP: 125/60 (2020 09:00) (99/50 - 131/61)  BP(mean): 86 (2020 09:00) (71 - 101)  RR: 24 (2020 09:00) (8 - 24)  SpO2: 98% (2020 09:00) (95% - 100%)  Daily     Daily Weight in k.2 (2020 04:57)    GENERAL: no acute distress  NEURO: alert, ++ asterixis  HEENT: anicteric sclera, no conjunctival pallor appreciated  CHEST: no respiratory distress, no accessory muscle use  CARDIAC: regular rate, rhythm  ABDOMEN: soft, non-tender, distended, no rebound or guarding  EXTREMITIES: warm, well perfused, no edema, ulcer w/ minimal serosanguinous drainage  SKIN: no lesions noted    LABS: reviewed                        7.9    15.32 )-----------( 16       ( 2020 01:19 )             22.9     07-01    133<L>  |  99  |  55<H>  ----------------------------<  105<H>  5.4<H>   |  23  |  1.33<H>    Ca    10.3      2020 05:21  Phos  2.9     07-01  Mg     2.0     07-01    TPro  4.7<L>  /  Alb  3.2<L>  /  TBili  15.9<H>  /  DBili  3.6<H>  /  AST  29  /  ALT  15  /  AlkPhos  83  07-01    LIVER FUNCTIONS - ( 2020 01:19 )  Alb: 3.2 g/dL / Pro: 4.7 g/dL / ALK PHOS: 83 U/L / ALT: 15 U/L / AST: 29 U/L / GGT: x             Interval Diagnostic Studies: see sunrise for full report

## 2020-07-01 NOTE — PROGRESS NOTE ADULT - ASSESSMENT
64 M hx of DM, HLD, GERD, HFpEF with mild LV diastolic dysfunction, and decompensated JEAN cirrhosis c/b ascites with hx of SBP, HE, duodenal ulcer, GAVE and duodenal AVM s/p APC (last on 10/11/19), UNOS listed for liver transplantation at [facility]. 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 He is now re-admitted with hepatic encephalopathy and VRE UTI, also with MISA on CKD. Pt awaiting liver transplant.    Impression:  #Cirrhosis 2/2 JEAN, decompensated by ascites; listed for liver transplant  - varices: grade II on EGD 6/22  - ascites: trace on U/S this admission  - HCC: neg on U/S this admission   -HE: + asterixis, on rifaximin and lactulose   - MELD-Na = 33 on 7/1  #GI bleed with acute blood loss anemia s/p 10U PRBCs (1 on 6/17, 2 on 6/21, 1 on 6/23, 2 on 6/24, 1 on 6/26, 1 on 6/27, 2 on 6/30) and EGD 6/22, bleeding from known GAVE  #R posterior heel wound w/ overlying eschar – no signs of infections, XR neg for gas, evaluated by podiatry and ID. MRI w/o contrast limited, but no overt signs of active infection.  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    Recommendation:  - discontinue tx for H pylori – this is not urgent and abx may complicate transplant course unnecessarily   - COVID swab today please  - cancel EGD as pt is awaiting possible transplant  - continue trend Hb, transfuse to Hb > 7 only  - c/w octreotide, infusion is okay for now  - c/w PPI IV BID  - trend CMP, CBC, INR daily  - hold lactulose  - c/w rifaximin  - c/w Bactrim for UTIs per ID  - continue home midodrine   - rest of care per SICU team    Ze Dominguez  Gastroenterology Fellow  MONDAY-FRIDAY 8AM-5PM:  Available via [employer] Teams  Call [phone] ([facility]) or Page [phone] ([facility]) from 8am-5pm M-F  AT NIGHT AND ON WEEKENDS:  Please contact on-call GI fellow via answering service ([phone])

## 2020-07-01 NOTE — PRE-ANESTHESIA EVALUATION ADULT - NSANTHPMHFT_GEN_ALL_CORE
64M Decomp-JEAN minimal ascites hx SBP HE MELD 33  HFpEF hyperdynamic orthostatic hypotension on Midodrine HLD DM  MISA ho UTI  GERD duodenal ulcer with AVM/APC last 10/19 Grade II varices ongoin GIB 10 units since 6/17 last 6/30 on Octreotide/PPI recent melena  PLT 16k Fibrinogen 100 INR 3 Na 133 Cr 1.3 K 5.5

## 2020-07-01 NOTE — PROGRESS NOTE ADULT - SUBJECTIVE AND OBJECTIVE BOX
Follow Up:  pre-OLT    Interval History: afebrile overnight. no n/v/d. denies urinary symptoms.     REVIEW OF SYSTEMS  [  ] ROS unobtainable because:    [ x ] All other systems negative except as noted below    Constitutional:  [ ] fever [ ] chills  [ ] weight loss  [ ] weakness  Skin:  [ ] rash [ ] phlebitis	  Eyes: [ ] icterus [ ] pain  [ ] discharge	  ENMT: [ ] sore throat  [ ] thrush [ ] ulcers [ ] exudates  Respiratory: [ ] dyspnea [ ] hemoptysis [ ] cough [ ] sputum	  Cardiovascular:  [ ] chest pain [ ] palpitations [ ] edema	  Gastrointestinal:  [ ] nausea [ ] vomiting [ ] diarrhea [ ] constipation [ ] pain	  Genitourinary:  [ ] dysuria [ ] frequency [ ] hematuria [ ] discharge [ ] flank pain  [ ] incontinence  Musculoskeletal:  [ ] myalgias [ ] arthralgias [ ] arthritis  [ ] back pain  Neurological:  [ ] headache [ ] seizures  [ ] confusion/altered mental status    Allergies  codeine (Anaphylaxis)        ANTIMICROBIALS:  nystatin    Suspension [id number] four times a day  rifAXIMin 550 two times a day  trimethoprim  160 mG/sulfamethoxazole 800 mG 1 daily      OTHER MEDS:  MEDICATIONS  (STANDING):  epoetin barry-epbx (RETACRIT) Injectable 21057 <User Schedule>  hepatitis B Vaccine - Adult (ENGERIX-B) 20 once  lactulose Syrup 30 every 6 hours  midodrine. 20 three times a day  octreotide  Infusion 50 <Continuous>  ondansetron Injectable 4 every 8 hours  pantoprazole  Injectable 40 every 12 hours  tamsulosin 0.4 at bedtime      Vital Signs Last 24 Hrs  T(C): 36.8 (01 Jul 2020 15:00), Max: 36.9 (30 Jun 2020 23:00)  T(F): 98.2 (01 Jul 2020 15:00), Max: 98.4 (30 Jun 2020 23:00)  HR: 73 (01 Jul 2020 18:14) (73 - 85)  BP: 117/56 (01 Jul 2020 17:00) (101/50 - 131/61)  BP(mean): 81 (01 Jul 2020 17:00) (72 - 101)  RR: 12 (01 Jul 2020 18:14) (11 - 24)  SpO2: 100% (01 Jul 2020 18:14) (95% - 100%)    PHYSICAL EXAMINATION:  General: Alert and Awake  HEENT: PERRL, EOMI, scleral icterus  Neck: Supple  Cardiac: RRR, No M/R/G  Resp: CTAB, No Wh/Rh/Ra  Abdomen: NBS, NT/ND, No HSM, No rigidity or guarding  MSK: R heel with overlying dressing. 1+ LE edema. No Calf tenderness  Skin: No rashes or lesions. Skin is warm and dry to the touch.   Neuro: Alert and Awake. oriented 3x CN 2-12 Grossly intact. Moves all four extremities spontaneously.  Psych: Calm, Pleasant, Cooperative                        7.9    15.32 )-----------( 16       ( 01 Jul 2020 01:19 )             22.9       07-01    133<L>  |  99  |  55<H>  ----------------------------<  105<H>  5.4<H>   |  23  |  1.33<H>    Ca    10.3      01 Jul 2020 05:21  Phos  2.9     07-01  Mg     2.0     07-01    TPro  4.7<L>  /  Alb  3.2<L>  /  TBili  15.9<H>  /  DBili  3.6<H>  /  AST  29  /  ALT  15  /  AlkPhos  83  07-01          MICROBIOLOGY:  v  .Blood Blood  06-24-20   No Growth Final  --  --      .Urine Clean Catch (Midstream)  06-21-20   <10,000 CFU/ml of other organisms  --  --      .Blood Blood-Peripheral  06-21-20   No Growth Final  --  --      .Urine Catheterized  06-14-20   >100,000 CFU/ml Enterococcus faecium (vancomycin resistant)  --  Enterococcus faecium (vancomycin resistant)      .Urine Clean Catch (Midstream)  06-14-20   >100,000 CFU/ml Enterococcus faecium (vancomycin resistant)  --  Enterococcus faecium (vancomycin resistant)      .Blood Blood-Peripheral  06-12-20   No Growth Final  --  --        CMV IgG Antibody: <0.20 U/mL (12-04-19 @ 08:33)  Toxoplasma IgG Screen: <3.0 IU/mL (12-04-19 @ 08:33)          RADIOLOGY:    <The imaging below has been reviewed and visualized by me independently. Findings as detailed in report below>    EXAM:  US DPLX ABDOMEN                        PROCEDURE DATE:  06/24/2020    Limited study.  IVC, main portal vein, left portal vein, and splenic vein at the hilum are patent. Prominent patent superior mesenteric vein.  Nonvisualization of the right portal vein, hepatic arteries, and splenic confluence.

## 2020-07-01 NOTE — PROGRESS NOTE ADULT - ATTENDING COMMENTS
Dr. Mace (Attending Physician)  Hepatic Encephalopathy - clear mentation today  JEAN cirrhosis - on octreotide infusion, listed for liver transplant  UGIB GAVE on ppi bid, duodenitis, dark stool this am no melena  CKD stage 2  Anemic, thrombocytopenia, TEG done suggesting need for plts and cryo, if bleeding worsens will transfuse, hb stable after tx yesterday  H. pylori dc'd clarithromycin and flagyl

## 2020-07-01 NOTE — PROGRESS NOTE ADULT - ASSESSMENT
63 y/o M PMHx decompensated JEAN Cirrhosis on transplant list, DMII, HFpEF, recent admission for ESBL E coli prostatic abscess 2/2 4 wks ertapenem, hx of ESBL UTIs, recent VRE urinary colonization came to hospital for worsening jaundice and episode of confusion, resolved PTA.     CT Pelvis with no evidence of abscess (but noncontrast)  BCx 6/12 with NGTD  UCx 6/14 with VRE  Mental status improved after initiation of linezolid  s/p 7 day empiric coverage for VRE    Noted to have purulent drainage from the right heel eschar by transplant surgery  No obvious drainage at time of my assessments  Podiatry without concerns for infection on reevaluation  MRI of foot without obvious infection or OM    Hypotension on 6/21 and drop in H/H  Blood Cultures (6/21) with NGTD  UCx (6/21) with <10K organisms  EGD on 6/22 with Acute gastritis with hemorrhage and Acute duodenitis with hemorrhage.    Had leukocytosis and encephalopathy which could have been related to hemorrhage   U/A (6/24) with 9 WBC  BCx (6/24) with NGTD     Leukocytosis today but received filgrastim yesterday  Afebrile and no other clinical change  No other s/s of infection  Monitor clinically for now     RECOMMENDATIONS    #Pre-OLT Evaluation, Encounter for Vaccination,  --No absolute ID contraindications for OLT  --Patient will require Daptomycin (or Linezold) PLUS Meropenem for danilo-operative prophylaxis  --PCV 13 and 23 vaccination completed February 2020    #Pancytopenia, Prophylactic Antibiotic  --s/p Filgrastim on 6/30  --Continue Bactrim PPx  --Continue to follow CBC with diff    #Hypotension, Encephalopathy, Positive UCx, VRE Colonization  --s/p treatment course for VRE  --Continue to follow CBC with diff  --Continue to follow temperature curve  --Follow up on repeat blood cultures    #R Heel Eschar  --Monitor off of antibiotics     #GIB - s/p EGD with Acute gastritis and duodenitis with hemorrhage  --Management per GI  --Continue to follow CBC with diff    #MISA  --Continue to follow renal function (Cr/BUN)  --Appreciate Nephro and transplant hepatology recommendations    I will continue to follow. Please feel free to contact me with any further questions.    Eusebio Pérez M.D.  [facility] Division of Infectious Disease  8AM-5PM: Pager Number [phone]  After Hours (or if no response): Please contact the Infectious Diseases Office at [phone]     The above assessment and plan were discussed with Transplant Surgery ZBIGNIEW Ruiz and Dr Gray

## 2020-07-01 NOTE — CHART NOTE - NSCHARTNOTEFT_GEN_A_CORE
Nutrition Follow Up Note   Patient seen for: nutrition follow up on  ICU     Source: medical record, communication with team.     Chart reviewed, events noted. Adm for liver failure, hepatic encephalopathy, transplant team following. Transferred back to ICU 6/30 for GIB. Ammonia 35,  MELD 32.      Diet Order: Diet, NPO:   Except Medications (06-30-20 @ 05:15)      Nutrition Events:   -made NPO for GIB (octreotide rx)  -has had variable po intake since adm (previous diet order Consistent Carbohydrate, NCK, 1.5L fluid restriction)    GI: no N/V, on lactulose w/ rx for 600-1000 ml stool/day, has had 2 BM today    Anthropometric Measurements:   Height (cm): 185.4 (06-11-20 @ 19:45), 185.42 (04-27-20 @ 14:53)  Weight (kg): 103 (06-11-20 @ 19:45), 117.9 (04-27-20 @ 14:53)  BMI (kg/m2): 30 (06-11-20 @ 19:45), 34.3 (04-27-20 @ 14:53)  IBW: 83.4 kg  7/1 wt 115 kg    Medications: MEDICATIONS  (STANDING):  chlorhexidine 2% Cloths 1 Application(s) Topical <User Schedule>  clarithromycin 500 milliGRAM(s) Oral two times a day  epoetin barry-epbx (RETACRIT) Injectable 63599 Unit(s) SubCutaneous <User Schedule>  folic acid 1 milliGRAM(s) Oral daily  hepatitis B Vaccine - Adult (ENGERIX-B) 20 MICROGram(s) IntraMuscular once  lactulose Syrup 30 Gram(s) Oral every 6 hours  metroNIDAZOLE    Tablet 500 milliGRAM(s) Oral every 8 hours  midodrine. 20 milliGRAM(s) Oral three times a day  nystatin    Suspension 275973 Unit(s) Oral four times a day  nystatin Cream 1 Application(s) Topical two times a day  octreotide  Infusion 50 MICROgram(s)/Hr (10 mL/Hr) IV Continuous <Continuous>  ondansetron Injectable 4 milliGRAM(s) IV Push every 8 hours  pantoprazole  Injectable 40 milliGRAM(s) IV Push every 12 hours  rifAXIMin 550 milliGRAM(s) Oral two times a day  tamsulosin 0.4 milliGRAM(s) Oral at bedtime  trimethoprim  160 mG/sulfamethoxazole 800 mG 1 Tablet(s) Oral daily    MEDICATIONS  (PRN):    Labs: 07-01 @ 05:21: Sodium 133<L>, Potassium 5.4<H>, Calcium 10.3, Magnesium 2.0, Phosphorus 2.9, BUN 55<H>, Creatinine 1.33<H>, Glucose 105<H>, Alk Phos --, ALT/SGPT --, AST/SGOT --, Albumin --, Prealbumin --, Total Bilirubin --, Hemoglobin --, Hematocrit --, Ferritin --, C-Reactive Protein --, Creatine Kinase <<27>  07-01 @ 01:19: Sodium 134<L>, Potassium 5.5<H>, Calcium 10.2, Magnesium 1.5<L>, Phosphorus 2.8, BUN 52<H>, Creatinine 1.30, Glucose 101<H>, Alk Phos 83, ALT/SGPT 15, AST/SGOT 29, Albumin 3.2<L>, Prealbumin --, Total Bilirubin 15.9<H>, Hemoglobin 7.9<L>, Hematocrit 22.9<L>, Ferritin --, C-Reactive Protein --, Creatine Kinase <<27>  06-30 @ 16:18: Sodium --, Potassium --, Calcium --, Magnesium --, Phosphorus --, BUN --, Creatinine --, Glucose --, Alk Phos --, ALT/SGPT --, AST/SGOT --, Albumin --, Prealbumin --, Total Bilirubin --, Hemoglobin 8.2<L>, Hematocrit 23.9<L>, Ferritin --, C-Reactive Protein --, Creatine Kinase <<27>  06-30 @ 12:07: Sodium --, Potassium --, Calcium --, Magnesium --, Phosphorus --, BUN --, Creatinine --, Glucose --, Alk Phos --, ALT/SGPT --, AST/SGOT --, Albumin --, Prealbumin --, Total Bilirubin --, Hemoglobin 7.0<LL>, Hematocrit 20.7<LL>, Ferritin --, C-Reactive Protein --, Creatine Kinase <<27>        POCT Blood Glucose.: 105 mg/dL (06-30-20 @ 21:22)  POCT Blood Glucose.: 104 mg/dL (06-30-20 @ 17:19)  POCT Blood Glucose.: 131 mg/dL (06-30-20 @ 10:58)  POCT Blood Glucose.: 178 mg/dL (06-30-20 @ 07:58)    Skin: right heel wound (dry and intact)  Edema: 2+ generalized    Estimated Needs: based on IBW 83.4 kg   Energy: 2225-0393 kcals (25-30 kcals/kg)  Protein:  100-117gm (1.2-1.4 gm/kg)      Previous Nutrition Diagnosis: increased needs  Nutrition Diagnosis is: ongoing, pt currently NPO, add supplement when diet is advanced    New Nutrition Diagnosis:      Recommended Interventions:   1. When diet resumed, recommend Consistent Carbohydrate,  fluid restriction oer team, add diet mighty shakes for addition kcals, protein  2. continue folic acid    Monitoring and Evaluation:   Continue to monitor nutrition provision and tolerance, weights, labs, skin integrity.   RD remains available upon request and will follow up per protocol.

## 2020-07-01 NOTE — PROGRESS NOTE ADULT - ATTENDING COMMENTS
63yo M listed for OLTx. Has graft allocated to him.  OR team has just cross clamped.  We reviewed the risks and benefits of OLTx again this AM.  All questions were answered.  Case discussed.  All questions answered.

## 2020-07-01 NOTE — PROGRESS NOTE ADULT - SUBJECTIVE AND OBJECTIVE BOX
HISTORY  65 y/o M with PMHx of decompensated JEAN cirrhosis c/b small esophageal varices (12/2019), portal hypertensive gastropathy, ascites, hx SBP, hx hepatic encephalopathy, GAVE syndrome s/p APC, hx duodenal ulcer (5/2019), HTN, HLD, DM, HFpEF (EF 70%), recent ESBL E coli prostate abscess (3/2020 s/p 4 weeks ertapenem), orthostatic hypotension ( on midodrine) who was initially admitted to medicine for hepatic encephalopathy. Hospital course c/b MISA, VRE UTI, acute blood loss anemia, received blood transfusion, EGD done 6/22 with Grade II esophageal varices, acute gastritis with hemorrhage and acute duodenitis with hemorrhage. Patient had worsening mental status, concerning decompensating liver cirrhosis, hepatic encephalopathy patient then transferred to SICU for further care.    24 HOUR EVENTS:   -H pylori positive, started on flagyl and clarithromycin therapy  -Zarxio 480mcg x1 given for leukopenia  -melana this AM, made NPO, octreotide resumed  -1u PRBC given, H/H 7.0/20.7 -> 8.2/23.9    SUBJECTIVE/ROS:  [ ] A ten-point review of systems was otherwise negative except as noted.  [ ] Due to altered mental status/intubation, subjective information were not able to be obtained from the patient. History was obtained, to the extent possible, from review of the chart and collateral sources of information.      NEURO  Exam: awake, alert, oriented  Meds:  ondansetron Injectable 4 milliGRAM(s) IV Push every 8 hours  [x] Adequacy of sedation and pain control has been assessed and adjusted      RESPIRATORY  RR: 11 (07-01-20 @ 00:00) (8 - 21)  SpO2: 98% (07-01-20 @ 00:00) (93% - 100%)  Exam: unlabored       CARDIOVASCULAR  HR: 80 (07-01-20 @ 00:00) (71 - 82)  BP: 113/58 (07-01-20 @ 00:00) (99/50 - 139/56)  BP(mean): 81 (07-01-20 @ 00:00) (71 - 88)  Exam: regular rate and rhythm  Cardiac Rhythm: normal sinus  Perfusion     [x]Adequate   [ ]Inadequate  Mentation   [x]Normal       [ ]Reduced  Extremities  [x]Warm         [ ]Cool  Volume Status [ ]Hypervolemic [x]Euvolemic [ ]Hypovolemic  Meds:  midodrine. 20 milliGRAM(s) Oral three times a day  tamsulosin 0.4 milliGRAM(s) Oral at bedtime        GI/NUTRITION  Exam: soft, nontender, nondistended  Diet: NPO  Meds:  lactulose Syrup 30 Gram(s) Oral every 6 hours  pantoprazole  Injectable 40 milliGRAM(s) IV Push every 12 hours      GENITOURINARY  I&O's Detail    06-29 @ 07:01  -  06-30 @ 07:00  --------------------------------------------------------  IN:    octreotide  Infusion: 10 mL    Oral Fluid: 360 mL    Packed Red Blood Cells: 350 mL  Total IN: 720 mL    OUT:    Voided: 4290 mL  Total OUT: 4290 mL    Total NET: -3570 mL      06-30 @ 07:01  -  07-01 @ 01:11  --------------------------------------------------------  IN:    octreotide  Infusion: 160 mL    Oral Fluid: 200 mL    Packed Red Blood Cells: 300 mL  Total IN: 660 mL    OUT:    Voided: 950 mL  Total OUT: 950 mL    Total NET: -290 mL    06-30    133<L>  |  101  |  47<H>  ----------------------------<  180<H>  5.2   |  23  |  1.43<H>    Ca    10.0      30 Jun 2020 05:17  Phos  3.1     06-30  Mg     1.7     06-30    TPro  5.1<L>  /  Alb  3.3  /  TBili  13.0<H>  /  DBili  2.7<H>  /  AST  29  /  ALT  16  /  AlkPhos  137<H>  06-30    [ ] Beasley catheter, indication: N/A  Meds:  folic acid 1 milliGRAM(s) Oral daily      HEMATOLOGIC  Meds:   [] VTE Prophylaxis: ICDs                        8.2    12.20 )-----------( 18       ( 30 Jun 2020 16:18 )             23.9     PT/INR - ( 30 Jun 2020 05:17 )   PT: 28.9 sec;   INR: 2.66 ratio         PTT - ( 30 Jun 2020 05:17 )  PTT:52.1 sec  Transfusion     [ ] PRBC   [ ] Platelets   [ ] FFP   [ ] Cryoprecipitate      INFECTIOUS DISEASES  WBC Count: 12.20 K/uL (06-30 @ 16:18)  WBC Count: 4.95 K/uL (06-30 @ 12:07)  WBC Count: 2.68 K/uL (06-30 @ 05:17)  WBC Count: 2.80 K/uL (06-30 @ 02:32)  RECENT CULTURES:   Meds: clarithromycin 500 milliGRAM(s) Oral two times a day  epoetin barry-epbx (RETACRIT) Injectable 59336 Unit(s) SubCutaneous <User Schedule>  hepatitis B Vaccine - Adult (ENGERIX-B) 20 MICROGram(s) IntraMuscular once  metroNIDAZOLE    Tablet 500 milliGRAM(s) Oral every 8 hours  nystatin    Suspension 423957 Unit(s) Oral four times a day  rifAXIMin 550 milliGRAM(s) Oral two times a day  trimethoprim  160 mG/sulfamethoxazole 800 mG 1 Tablet(s) Oral daily        ENDOCRINE  CAPILLARY BLOOD GLUCOSE  POCT Blood Glucose.: 105 mg/dL (30 Jun 2020 21:22)  POCT Blood Glucose.: 104 mg/dL (30 Jun 2020 17:19)  POCT Blood Glucose.: 131 mg/dL (30 Jun 2020 10:58)  POCT Blood Glucose.: 178 mg/dL (30 Jun 2020 07:58)    Meds: octreotide  Infusion 50 MICROgram(s)/Hr IV Continuous <Continuous>      ACCESS DEVICES:  [x] Peripheral IV  [ ] Central Venous Line	[ ] R	[ ] L	[ ] IJ	[ ] Fem	[ ] SC	Placed:   [ ] Arterial Line		[ ] R	[ ] L	[ ] Fem	[ ] Rad	[ ] Ax	Placed:   [ ] PICC:					[ ] Mediport  [ ] Urinary Catheter, Date Placed:   [x] Necessity of urinary, arterial, and venous catheters discussed    OTHER MEDICATIONS:  chlorhexidine 2% Cloths 1 Application(s) Topical <User Schedule>  nystatin Cream 1 Application(s) Topical two times a day      CODE STATUS: full code      IMAGING:

## 2020-07-01 NOTE — PROCEDURE NOTE - NSPROCDETAILS_GEN_ALL_CORE
location identified, draped/prepped, sterile technique used, needle inserted/introduced positive blood return obtained via catheter/Seldinger technique/ultrasound guidance/sutured in place/connected to a pressurized flush line/all materials/supplies accounted for at end of procedure/location identified, draped/prepped, sterile technique used, needle inserted/introduced

## 2020-07-02 ENCOUNTER — RESULT REVIEW (OUTPATIENT)
Age: 65
End: 2020-07-02

## 2020-07-02 ENCOUNTER — APPOINTMENT (OUTPATIENT)
Dept: TRANSPLANT | Facility: HOSPITAL | Age: 65
End: 2020-07-02

## 2020-07-02 ENCOUNTER — APPOINTMENT (OUTPATIENT)
Dept: TRANSPLANT | Facility: CLINIC | Age: 65
End: 2020-07-02

## 2020-07-02 LAB
ALBUMIN SERPL ELPH-MCNC: 2.9 G/DL — LOW (ref 3.3–5)
ALBUMIN SERPL ELPH-MCNC: 3.2 G/DL — LOW (ref 3.3–5)
ALBUMIN SERPL ELPH-MCNC: 3.3 G/DL — SIGNIFICANT CHANGE UP (ref 3.3–5)
ALP SERPL-CCNC: 60 U/L — SIGNIFICANT CHANGE UP (ref 40–120)
ALP SERPL-CCNC: 72 U/L — SIGNIFICANT CHANGE UP (ref 40–120)
ALP SERPL-CCNC: 82 U/L — SIGNIFICANT CHANGE UP (ref 40–120)
ALT FLD-CCNC: 18 U/L — SIGNIFICANT CHANGE UP (ref 10–45)
ALT FLD-CCNC: 601 U/L — HIGH (ref 10–45)
ALT FLD-CCNC: 607 U/L — HIGH (ref 10–45)
AMMONIA BLD-MCNC: 62 UMOL/L — HIGH (ref 11–55)
ANION GAP SERPL CALC-SCNC: 13 MMOL/L — SIGNIFICANT CHANGE UP (ref 5–17)
ANION GAP SERPL CALC-SCNC: 17 MMOL/L — SIGNIFICANT CHANGE UP (ref 5–17)
ANION GAP SERPL CALC-SCNC: 8 MMOL/L — SIGNIFICANT CHANGE UP (ref 5–17)
APTT BLD: 30.9 SEC — SIGNIFICANT CHANGE UP (ref 27.5–35.5)
APTT BLD: 36.5 SEC — HIGH (ref 27.5–35.5)
APTT BLD: 52.8 SEC — HIGH (ref 27.5–35.5)
AST SERPL-CCNC: 1041 U/L — HIGH (ref 10–40)
AST SERPL-CCNC: 27 U/L — SIGNIFICANT CHANGE UP (ref 10–40)
AST SERPL-CCNC: 854 U/L — HIGH (ref 10–40)
BASE EXCESS BLDV CALC-SCNC: 2.2 MMOL/L — HIGH (ref -2–2)
BILIRUB DIRECT SERPL-MCNC: 3.4 MG/DL — HIGH (ref 0–0.2)
BILIRUB DIRECT SERPL-MCNC: 4.8 MG/DL — HIGH (ref 0–0.2)
BILIRUB INDIRECT FLD-MCNC: 10.8 MG/DL — HIGH (ref 0.2–1)
BILIRUB INDIRECT FLD-MCNC: 3.7 MG/DL — HIGH (ref 0.2–1)
BILIRUB SERPL-MCNC: 14.2 MG/DL — HIGH (ref 0.2–1.2)
BILIRUB SERPL-MCNC: 8.2 MG/DL — HIGH (ref 0.2–1.2)
BILIRUB SERPL-MCNC: 8.5 MG/DL — HIGH (ref 0.2–1.2)
BLD GP AB SCN SERPL QL: NEGATIVE — SIGNIFICANT CHANGE UP
BLOOD GAS VENOUS - CREATININE: SIGNIFICANT CHANGE UP MG/DL (ref 0.5–1.3)
BUN SERPL-MCNC: 38 MG/DL — HIGH (ref 7–23)
BUN SERPL-MCNC: 38 MG/DL — HIGH (ref 7–23)
BUN SERPL-MCNC: 51 MG/DL — HIGH (ref 7–23)
CA-I SERPL-SCNC: 1.28 MMOL/L — SIGNIFICANT CHANGE UP (ref 1.12–1.3)
CALCIUM SERPL-MCNC: 11.9 MG/DL — HIGH (ref 8.4–10.5)
CALCIUM SERPL-MCNC: 12.4 MG/DL — HIGH (ref 8.4–10.5)
CALCIUM SERPL-MCNC: 9.7 MG/DL — SIGNIFICANT CHANGE UP (ref 8.4–10.5)
CHLORIDE BLDV-SCNC: SIGNIFICANT CHANGE UP MMOL/L (ref 96–108)
CHLORIDE SERPL-SCNC: 100 MMOL/L — SIGNIFICANT CHANGE UP (ref 96–108)
CO2 SERPL-SCNC: 21 MMOL/L — LOW (ref 22–31)
CO2 SERPL-SCNC: 23 MMOL/L — SIGNIFICANT CHANGE UP (ref 22–31)
CO2 SERPL-SCNC: 24 MMOL/L — SIGNIFICANT CHANGE UP (ref 22–31)
CREAT SERPL-MCNC: 1.15 MG/DL — SIGNIFICANT CHANGE UP (ref 0.5–1.3)
CREAT SERPL-MCNC: 1.18 MG/DL — SIGNIFICANT CHANGE UP (ref 0.5–1.3)
CREAT SERPL-MCNC: 1.42 MG/DL — HIGH (ref 0.5–1.3)
FIBRINOGEN PPP-MCNC: 101 MG/DL — LOW (ref 350–510)
FIBRINOGEN PPP-MCNC: 351 MG/DL — SIGNIFICANT CHANGE UP (ref 350–510)
FIBRINOGEN PPP-MCNC: 389 MG/DL — SIGNIFICANT CHANGE UP (ref 350–510)
GAS PNL BLDA: SIGNIFICANT CHANGE UP
GAS PNL BLDV: 130 MMOL/L — LOW (ref 135–145)
GAS PNL BLDV: SIGNIFICANT CHANGE UP
GAS PNL BLDV: SIGNIFICANT CHANGE UP
GLUCOSE BLDC GLUCOMTR-MCNC: 152 MG/DL — HIGH (ref 70–99)
GLUCOSE BLDC GLUCOMTR-MCNC: 191 MG/DL — HIGH (ref 70–99)
GLUCOSE BLDC GLUCOMTR-MCNC: 194 MG/DL — HIGH (ref 70–99)
GLUCOSE BLDC GLUCOMTR-MCNC: 195 MG/DL — HIGH (ref 70–99)
GLUCOSE BLDC GLUCOMTR-MCNC: 220 MG/DL — HIGH (ref 70–99)
GLUCOSE BLDV-MCNC: 184 MG/DL — HIGH (ref 70–99)
GLUCOSE SERPL-MCNC: 192 MG/DL — HIGH (ref 70–99)
GLUCOSE SERPL-MCNC: 225 MG/DL — HIGH (ref 70–99)
GLUCOSE SERPL-MCNC: 265 MG/DL — HIGH (ref 70–99)
HCO3 BLDV-SCNC: 27 MMOL/L — SIGNIFICANT CHANGE UP (ref 21–29)
HCT VFR BLD CALC: 21.4 % — LOW (ref 39–50)
HCT VFR BLD CALC: 25.9 % — LOW (ref 39–50)
HCT VFR BLD CALC: 29.5 % — LOW (ref 39–50)
HCT VFR BLDA CALC: 25 % — LOW (ref 39–50)
HEPARINASE TEG R TIME: 5.7 MIN — SIGNIFICANT CHANGE UP (ref 4.3–8.3)
HEPARINASE TEG R TIME: 6.7 MIN — SIGNIFICANT CHANGE UP (ref 4.3–8.3)
HEPARINASE TEG R TIME: 6.7 MIN — SIGNIFICANT CHANGE UP (ref 4.3–8.3)
HEPARINASE TEG R TIME: 7.9 MIN — SIGNIFICANT CHANGE UP (ref 4.3–8.3)
HGB BLD CALC-MCNC: 8 G/DL — LOW (ref 13–17)
HGB BLD-MCNC: 10.5 G/DL — LOW (ref 13–17)
HGB BLD-MCNC: 7.4 G/DL — LOW (ref 13–17)
HGB BLD-MCNC: 9 G/DL — LOW (ref 13–17)
HOROWITZ INDEX BLDV+IHG-RTO: 0 — SIGNIFICANT CHANGE UP
INR BLD: 1.28 RATIO — HIGH (ref 0.88–1.16)
INR BLD: 1.28 RATIO — HIGH (ref 0.88–1.16)
INR BLD: 3.05 RATIO — HIGH (ref 0.88–1.16)
LACTATE BLDV-MCNC: 1.6 MMOL/L — SIGNIFICANT CHANGE UP (ref 0.7–2)
LACTATE SERPL-SCNC: 5.6 MMOL/L — CRITICAL HIGH (ref 0.7–2)
MAGNESIUM SERPL-MCNC: 1.8 MG/DL — SIGNIFICANT CHANGE UP (ref 1.6–2.6)
MAGNESIUM SERPL-MCNC: 2 MG/DL — SIGNIFICANT CHANGE UP (ref 1.6–2.6)
MCHC RBC-ENTMCNC: 31 PG — SIGNIFICANT CHANGE UP (ref 27–34)
MCHC RBC-ENTMCNC: 31.4 PG — SIGNIFICANT CHANGE UP (ref 27–34)
MCHC RBC-ENTMCNC: 32.9 PG — SIGNIFICANT CHANGE UP (ref 27–34)
MCHC RBC-ENTMCNC: 34.6 GM/DL — SIGNIFICANT CHANGE UP (ref 32–36)
MCHC RBC-ENTMCNC: 34.7 GM/DL — SIGNIFICANT CHANGE UP (ref 32–36)
MCHC RBC-ENTMCNC: 35.6 GM/DL — SIGNIFICANT CHANGE UP (ref 32–36)
MCV RBC AUTO: 87 FL — SIGNIFICANT CHANGE UP (ref 80–100)
MCV RBC AUTO: 90.2 FL — SIGNIFICANT CHANGE UP (ref 80–100)
MCV RBC AUTO: 95.1 FL — SIGNIFICANT CHANGE UP (ref 80–100)
NRBC # BLD: 0 /100 WBCS — SIGNIFICANT CHANGE UP (ref 0–0)
PCO2 BLDV: 46 MMHG — SIGNIFICANT CHANGE UP (ref 35–50)
PH BLDV: 7.39 — SIGNIFICANT CHANGE UP (ref 7.35–7.45)
PHOSPHATE SERPL-MCNC: 2.8 MG/DL — SIGNIFICANT CHANGE UP (ref 2.5–4.5)
PHOSPHATE SERPL-MCNC: 4 MG/DL — SIGNIFICANT CHANGE UP (ref 2.5–4.5)
PLATELET # BLD AUTO: 173 K/UL — SIGNIFICANT CHANGE UP (ref 150–400)
PLATELET # BLD AUTO: 176 K/UL — SIGNIFICANT CHANGE UP (ref 150–400)
PLATELET # BLD AUTO: 21 K/UL — LOW (ref 150–400)
PLATELET MAPPING ACTF MAX AMPLITUDE: 11.9 MM — SIGNIFICANT CHANGE UP (ref 2–19)
PLATELET MAPPING ADP MAXIMUM AMPLITUDE: 26 MM (ref 45–69)
PLATELET MAPPING ADP PERCENT INHIBITION: 72.2 % (ref 0–17)
PLATELET MAPPING HKH MAXIMUM AMPLITUDE: 62.7 MM — SIGNIFICANT CHANGE UP (ref 53–68)
PO2 BLDV: 70 MMHG — HIGH (ref 25–45)
POTASSIUM BLDV-SCNC: 4.4 MMOL/L — SIGNIFICANT CHANGE UP (ref 3.5–5)
POTASSIUM SERPL-MCNC: 3.9 MMOL/L — SIGNIFICANT CHANGE UP (ref 3.5–5.3)
POTASSIUM SERPL-MCNC: 4.3 MMOL/L — SIGNIFICANT CHANGE UP (ref 3.5–5.3)
POTASSIUM SERPL-MCNC: 5.1 MMOL/L — SIGNIFICANT CHANGE UP (ref 3.5–5.3)
POTASSIUM SERPL-SCNC: 3.9 MMOL/L — SIGNIFICANT CHANGE UP (ref 3.5–5.3)
POTASSIUM SERPL-SCNC: 4.3 MMOL/L — SIGNIFICANT CHANGE UP (ref 3.5–5.3)
POTASSIUM SERPL-SCNC: 5.1 MMOL/L — SIGNIFICANT CHANGE UP (ref 3.5–5.3)
PROT SERPL-MCNC: 4.8 G/DL — LOW (ref 6–8.3)
PROT SERPL-MCNC: 5.2 G/DL — LOW (ref 6–8.3)
PROT SERPL-MCNC: 5.4 G/DL — LOW (ref 6–8.3)
PROTHROM AB SERPL-ACNC: 14.5 SEC — HIGH (ref 10.6–13.6)
PROTHROM AB SERPL-ACNC: 14.6 SEC — HIGH (ref 10.6–13.6)
PROTHROM AB SERPL-ACNC: 33 SEC — HIGH (ref 10.6–13.6)
RAPIDTEG MAXIMUM AMPLITUDE: 52.6 MM — SIGNIFICANT CHANGE UP (ref 52–70)
RAPIDTEG MAXIMUM AMPLITUDE: 57.2 MM — SIGNIFICANT CHANGE UP (ref 52–70)
RAPIDTEG MAXIMUM AMPLITUDE: 62.2 MM — SIGNIFICANT CHANGE UP (ref 52–70)
RAPIDTEG MAXIMUM AMPLITUDE: <40 MM (ref 52–70)
RBC # BLD: 2.25 M/UL — LOW (ref 4.2–5.8)
RBC # BLD: 2.87 M/UL — LOW (ref 4.2–5.8)
RBC # BLD: 3.39 M/UL — LOW (ref 4.2–5.8)
RBC # FLD: 14.8 % — HIGH (ref 10.3–14.5)
RBC # FLD: 15 % — HIGH (ref 10.3–14.5)
RBC # FLD: 17 % — HIGH (ref 10.3–14.5)
RH IG SCN BLD-IMP: POSITIVE — SIGNIFICANT CHANGE UP
SAO2 % BLDV: 92 % — HIGH (ref 67–88)
SODIUM SERPL-SCNC: 131 MMOL/L — LOW (ref 135–145)
SODIUM SERPL-SCNC: 137 MMOL/L — SIGNIFICANT CHANGE UP (ref 135–145)
SODIUM SERPL-SCNC: 138 MMOL/L — SIGNIFICANT CHANGE UP (ref 135–145)
TEG FUNCTIONAL FIBRINOGEN: 12.7 MM (ref 15–32)
TEG FUNCTIONAL FIBRINOGEN: 17.2 MM — SIGNIFICANT CHANGE UP (ref 15–32)
TEG FUNCTIONAL FIBRINOGEN: 19.6 MM — SIGNIFICANT CHANGE UP (ref 15–32)
TEG FUNCTIONAL FIBRINOGEN: <4 MM (ref 15–32)
TEG MAXIMUM AMPLITUDE: 54.6 MM — SIGNIFICANT CHANGE UP (ref 52–69)
TEG MAXIMUM AMPLITUDE: 55.8 MM — SIGNIFICANT CHANGE UP (ref 52–69)
TEG MAXIMUM AMPLITUDE: 62.7 MM — SIGNIFICANT CHANGE UP (ref 52–69)
TEG MAXIMUM AMPLITUDE: <40 MM (ref 52–69)
TEG REACTION TIME: 6 MIN — SIGNIFICANT CHANGE UP (ref 4.6–9.1)
TEG REACTION TIME: 7.7 MIN — SIGNIFICANT CHANGE UP (ref 4.6–9.1)
TEG REACTION TIME: 8.2 MIN — SIGNIFICANT CHANGE UP (ref 4.6–9.1)
TEG REACTION TIME: 9.4 MIN (ref 4.6–9.1)
WBC # BLD: 10.82 K/UL — HIGH (ref 3.8–10.5)
WBC # BLD: 12.4 K/UL — HIGH (ref 3.8–10.5)
WBC # BLD: 8.71 K/UL — SIGNIFICANT CHANGE UP (ref 3.8–10.5)
WBC # FLD AUTO: 10.82 K/UL — HIGH (ref 3.8–10.5)
WBC # FLD AUTO: 12.4 K/UL — HIGH (ref 3.8–10.5)
WBC # FLD AUTO: 8.71 K/UL — SIGNIFICANT CHANGE UP (ref 3.8–10.5)

## 2020-07-02 PROCEDURE — 88309 TISSUE EXAM BY PATHOLOGIST: CPT | Mod: 26

## 2020-07-02 PROCEDURE — 88312 SPECIAL STAINS GROUP 1: CPT | Mod: 26

## 2020-07-02 PROCEDURE — 47135 TRANSPLANTATION OF LIVER: CPT

## 2020-07-02 PROCEDURE — 88304 TISSUE EXAM BY PATHOLOGIST: CPT | Mod: 26

## 2020-07-02 PROCEDURE — 99291 CRITICAL CARE FIRST HOUR: CPT

## 2020-07-02 PROCEDURE — 71045 X-RAY EXAM CHEST 1 VIEW: CPT | Mod: 26

## 2020-07-02 PROCEDURE — 71045 X-RAY EXAM CHEST 1 VIEW: CPT | Mod: 26,77

## 2020-07-02 PROCEDURE — 88313 SPECIAL STAINS GROUP 2: CPT | Mod: 26

## 2020-07-02 PROCEDURE — 93975 VASCULAR STUDY: CPT | Mod: 26

## 2020-07-02 PROCEDURE — 74018 RADEX ABDOMEN 1 VIEW: CPT | Mod: 26

## 2020-07-02 RX ORDER — SODIUM CHLORIDE 9 MG/ML
1000 INJECTION, SOLUTION INTRAVENOUS
Refills: 0 | Status: DISCONTINUED | OUTPATIENT
Start: 2020-07-02 | End: 2020-07-04

## 2020-07-02 RX ORDER — MAGNESIUM SULFATE 500 MG/ML
1 VIAL (ML) INJECTION ONCE
Refills: 0 | Status: COMPLETED | OUTPATIENT
Start: 2020-07-02 | End: 2020-07-02

## 2020-07-02 RX ORDER — MEROPENEM 1 G/30ML
1000 INJECTION INTRAVENOUS EVERY 8 HOURS
Refills: 0 | Status: DISCONTINUED | OUTPATIENT
Start: 2020-07-02 | End: 2020-07-04

## 2020-07-02 RX ORDER — SODIUM CHLORIDE 9 MG/ML
1000 INJECTION, SOLUTION INTRAVENOUS
Refills: 0 | Status: DISCONTINUED | OUTPATIENT
Start: 2020-07-02 | End: 2020-07-02

## 2020-07-02 RX ORDER — INSULIN HUMAN 100 [IU]/ML
5 INJECTION, SOLUTION SUBCUTANEOUS
Qty: 100 | Refills: 0 | Status: DISCONTINUED | OUTPATIENT
Start: 2020-07-02 | End: 2020-07-04

## 2020-07-02 RX ORDER — DEXMEDETOMIDINE HYDROCHLORIDE IN 0.9% SODIUM CHLORIDE 4 UG/ML
0.2 INJECTION INTRAVENOUS
Qty: 200 | Refills: 0 | Status: DISCONTINUED | OUTPATIENT
Start: 2020-07-02 | End: 2020-07-04

## 2020-07-02 RX ORDER — NYSTATIN 500MM UNIT
100000 POWDER (EA) MISCELLANEOUS
Refills: 0 | Status: DISCONTINUED | OUTPATIENT
Start: 2020-07-03 | End: 2020-07-22

## 2020-07-02 RX ORDER — POTASSIUM CHLORIDE 20 MEQ
10 PACKET (EA) ORAL ONCE
Refills: 0 | Status: COMPLETED | OUTPATIENT
Start: 2020-07-02 | End: 2020-07-02

## 2020-07-02 RX ORDER — TACROLIMUS 5 MG/1
4 CAPSULE ORAL
Refills: 0 | Status: DISCONTINUED | OUTPATIENT
Start: 2020-07-03 | End: 2020-07-05

## 2020-07-02 RX ORDER — FENTANYL CITRATE 50 UG/ML
1 INJECTION INTRAVENOUS
Qty: 5000 | Refills: 0 | Status: DISCONTINUED | OUTPATIENT
Start: 2020-07-02 | End: 2020-07-03

## 2020-07-02 RX ORDER — DAPTOMYCIN 500 MG/10ML
400 INJECTION, POWDER, LYOPHILIZED, FOR SOLUTION INTRAVENOUS EVERY 24 HOURS
Refills: 0 | Status: COMPLETED | OUTPATIENT
Start: 2020-07-02 | End: 2020-07-04

## 2020-07-02 RX ORDER — NICARDIPINE HYDROCHLORIDE 30 MG/1
5 CAPSULE, EXTENDED RELEASE ORAL
Qty: 40 | Refills: 0 | Status: DISCONTINUED | OUTPATIENT
Start: 2020-07-02 | End: 2020-07-03

## 2020-07-02 RX ORDER — SENNA PLUS 8.6 MG/1
2 TABLET ORAL
Refills: 0 | Status: DISCONTINUED | OUTPATIENT
Start: 2020-07-02 | End: 2020-07-02

## 2020-07-02 RX ORDER — DEXTROSE 50 % IN WATER 50 %
25 SYRINGE (ML) INTRAVENOUS
Refills: 0 | Status: DISCONTINUED | OUTPATIENT
Start: 2020-07-02 | End: 2020-07-02

## 2020-07-02 RX ORDER — CHLORHEXIDINE GLUCONATE 213 G/1000ML
15 SOLUTION TOPICAL
Refills: 0 | Status: DISCONTINUED | OUTPATIENT
Start: 2020-07-02 | End: 2020-07-02

## 2020-07-02 RX ORDER — BASILIXIMAB 20 MG/5ML
20 INJECTION, POWDER, FOR SOLUTION INTRAVENOUS ONCE
Refills: 0 | Status: DISCONTINUED | OUTPATIENT
Start: 2020-07-02 | End: 2020-07-02

## 2020-07-02 RX ORDER — MYCOPHENOLATE MOFETIL 250 MG/1
1000 CAPSULE ORAL
Refills: 0 | Status: DISCONTINUED | OUTPATIENT
Start: 2020-07-03 | End: 2020-07-05

## 2020-07-02 RX ORDER — PANTOPRAZOLE SODIUM 20 MG/1
40 TABLET, DELAYED RELEASE ORAL
Refills: 0 | Status: DISCONTINUED | OUTPATIENT
Start: 2020-07-03 | End: 2020-07-05

## 2020-07-02 RX ORDER — PANTOPRAZOLE SODIUM 20 MG/1
40 TABLET, DELAYED RELEASE ORAL
Refills: 0 | Status: DISCONTINUED | OUTPATIENT
Start: 2020-07-02 | End: 2020-07-02

## 2020-07-02 RX ORDER — DEXTROSE 50 % IN WATER 50 %
50 SYRINGE (ML) INTRAVENOUS
Refills: 0 | Status: DISCONTINUED | OUTPATIENT
Start: 2020-07-02 | End: 2020-07-02

## 2020-07-02 RX ORDER — OCTREOTIDE ACETATE 200 UG/ML
10 INJECTION, SOLUTION INTRAVENOUS; SUBCUTANEOUS
Qty: 500 | Refills: 0 | Status: DISCONTINUED | OUTPATIENT
Start: 2020-07-02 | End: 2020-07-02

## 2020-07-02 RX ORDER — NICARDIPINE HYDROCHLORIDE 30 MG/1
5 CAPSULE, EXTENDED RELEASE ORAL
Qty: 40 | Refills: 0 | Status: DISCONTINUED | OUTPATIENT
Start: 2020-07-02 | End: 2020-07-02

## 2020-07-02 RX ORDER — HYDRALAZINE HCL 50 MG
10 TABLET ORAL ONCE
Refills: 0 | Status: COMPLETED | OUTPATIENT
Start: 2020-07-02 | End: 2020-07-02

## 2020-07-02 RX ORDER — CHLORHEXIDINE GLUCONATE 213 G/1000ML
15 SOLUTION TOPICAL EVERY 12 HOURS
Refills: 0 | Status: DISCONTINUED | OUTPATIENT
Start: 2020-07-02 | End: 2020-07-04

## 2020-07-02 RX ORDER — OCTREOTIDE ACETATE 200 UG/ML
20 INJECTION, SOLUTION INTRAVENOUS; SUBCUTANEOUS
Qty: 500 | Refills: 0 | Status: DISCONTINUED | OUTPATIENT
Start: 2020-07-02 | End: 2020-07-03

## 2020-07-02 RX ORDER — CHLORHEXIDINE GLUCONATE 213 G/1000ML
1 SOLUTION TOPICAL
Refills: 0 | Status: DISCONTINUED | OUTPATIENT
Start: 2020-07-02 | End: 2020-07-07

## 2020-07-02 RX ORDER — HYDRALAZINE HCL 50 MG
5 TABLET ORAL ONCE
Refills: 0 | Status: DISCONTINUED | OUTPATIENT
Start: 2020-07-02 | End: 2020-07-02

## 2020-07-02 RX ORDER — SODIUM CHLORIDE 9 MG/ML
10 INJECTION INTRAMUSCULAR; INTRAVENOUS; SUBCUTANEOUS
Refills: 0 | Status: DISCONTINUED | OUTPATIENT
Start: 2020-07-02 | End: 2020-07-08

## 2020-07-02 RX ORDER — VALGANCICLOVIR 450 MG/1
450 TABLET, FILM COATED ORAL DAILY
Refills: 0 | Status: DISCONTINUED | OUTPATIENT
Start: 2020-07-03 | End: 2020-07-05

## 2020-07-02 RX ORDER — LABETALOL HCL 100 MG
10 TABLET ORAL
Refills: 0 | Status: DISCONTINUED | OUTPATIENT
Start: 2020-07-02 | End: 2020-07-02

## 2020-07-02 RX ORDER — LABETALOL HCL 100 MG
10 TABLET ORAL
Refills: 0 | Status: DISCONTINUED | OUTPATIENT
Start: 2020-07-02 | End: 2020-07-08

## 2020-07-02 RX ADMIN — CHLORHEXIDINE GLUCONATE 1 APPLICATION(S): 213 SOLUTION TOPICAL at 21:25

## 2020-07-02 RX ADMIN — OCTREOTIDE ACETATE 10 MICROGRAM(S)/HR: 200 INJECTION, SOLUTION INTRAVENOUS; SUBCUTANEOUS at 21:24

## 2020-07-02 RX ADMIN — CHLORHEXIDINE GLUCONATE 1 APPLICATION(S): 213 SOLUTION TOPICAL at 05:48

## 2020-07-02 RX ADMIN — OCTREOTIDE ACETATE 8 MICROGRAM(S)/HR: 200 INJECTION, SOLUTION INTRAVENOUS; SUBCUTANEOUS at 23:00

## 2020-07-02 RX ADMIN — SODIUM CHLORIDE 125 MILLILITER(S): 9 INJECTION, SOLUTION INTRAVENOUS at 20:44

## 2020-07-02 RX ADMIN — INSULIN HUMAN 5 UNIT(S)/HR: 100 INJECTION, SOLUTION SUBCUTANEOUS at 21:24

## 2020-07-02 RX ADMIN — NICARDIPINE HYDROCHLORIDE 25 MG/HR: 30 CAPSULE, EXTENDED RELEASE ORAL at 22:38

## 2020-07-02 RX ADMIN — MEROPENEM 100 MILLIGRAM(S): 1 INJECTION INTRAVENOUS at 21:23

## 2020-07-02 RX ADMIN — DEXMEDETOMIDINE HYDROCHLORIDE IN 0.9% SODIUM CHLORIDE 5.15 MICROGRAM(S)/KG/HR: 4 INJECTION INTRAVENOUS at 20:44

## 2020-07-02 RX ADMIN — ONDANSETRON 4 MILLIGRAM(S): 8 TABLET, FILM COATED ORAL at 05:48

## 2020-07-02 RX ADMIN — Medication 2: at 06:17

## 2020-07-02 RX ADMIN — FENTANYL CITRATE 5.15 MICROGRAM(S)/KG/HR: 50 INJECTION INTRAVENOUS at 20:43

## 2020-07-02 RX ADMIN — NICARDIPINE HYDROCHLORIDE 25 MG/HR: 30 CAPSULE, EXTENDED RELEASE ORAL at 22:18

## 2020-07-02 RX ADMIN — PANTOPRAZOLE SODIUM 40 MILLIGRAM(S): 20 TABLET, DELAYED RELEASE ORAL at 05:48

## 2020-07-02 RX ADMIN — Medication 100 GRAM(S): at 02:14

## 2020-07-02 RX ADMIN — Medication 5 MILLIGRAM(S): at 19:45

## 2020-07-02 RX ADMIN — OCTREOTIDE ACETATE 10 MICROGRAM(S)/HR: 200 INJECTION, SOLUTION INTRAVENOUS; SUBCUTANEOUS at 06:19

## 2020-07-02 RX ADMIN — CHLORHEXIDINE GLUCONATE 15 MILLILITER(S): 213 SOLUTION TOPICAL at 05:48

## 2020-07-02 RX ADMIN — Medication 100 MILLIEQUIVALENT(S): at 21:24

## 2020-07-02 RX ADMIN — INSULIN HUMAN 5 UNIT(S)/HR: 100 INJECTION, SOLUTION SUBCUTANEOUS at 20:44

## 2020-07-02 RX ADMIN — NYSTATIN CREAM 1 APPLICATION(S): 100000 CREAM TOPICAL at 05:49

## 2020-07-02 RX ADMIN — Medication 62.5 MILLIMOLE(S): at 02:14

## 2020-07-02 RX ADMIN — Medication 10 MILLIGRAM(S): at 20:30

## 2020-07-02 NOTE — PROGRESS NOTE ADULT - ASSESSMENT
Assessment:  65 y/o male w/ a PMHx of HTN, HLD, DM type II, HFpEF (EF 70%), and cirrhosis secondary to JEAN c/b small esophageal varices, portal hypertensive gastropathy, ascites, SBP, hepatic encephalopathy, GAVE syndrome s/p APC, duodenal ulcer (5/2019), recent ESBL E coli prostate abscess (3/2020 s/p 4 weeks of ertapenem), and orthostatic hypotension (on midodrine) presenting with decompensated cirrhosis w/ a hospital course c/b MISA, hyponatremia, VRE UTI, acute blood loss anemia from GAVE syndrome, and hepatic encephalopathy    PLAN:  Neurologic: hepatic encephalopathy  -monitor mental status  -rifaxamin and lactulose for hepatic encephalopathy  -currently denies pain    Respiratory: no acute issues  -continuous pulse oximetry, saturating well on room air  -encourage IS, OOBTC    Cardiovascular: orthostatic hypotension  -currently hemodynamically stable  -on home midodrine 20mg TID  -monitor vital signs    Gastrointestinal/Nutrition: GIB, JEAN cirrhosis, GAVE syndrome  -NPO due to GIB  -Octreotide infusion and Protonix 40 mg IV BID for GAVE syndrome    Renal: urinary retention, hyponatremia  - Monitor I&Os  - Monitor electrolytes and replete as necessary  - Flomax for BPH    Heme: coagulopathy  - Trend H/H, transfuse prn for Hgb < 7.0  - Epogen for anemia as per nephrology  - Venodynes for mechanical VTE prophylaxis, holding chemical VTE prophylaxis in the setting of coagulopathy/GIB    Infectious Disease: chronic UTI, H pylori  -c/w Bactrim for chronic UTI ppx  -discontinued clarithromycin and flagyl for H pylori in preparation for OR  -Meropenem x1 dose and Daptomycin x 14d to be given in OR as surgical ppx  -nystatin for thrush ppx    Endo: DM type II, HLD  -holding insulin while NPO, FS q6hr    Disposition:  OR today, return to SICU afterwards  Full code Assessment:  63 y/o male w/ a PMHx of HTN, HLD, DM type II, HFpEF (EF 70%), and cirrhosis secondary to JEAN c/b small esophageal varices, portal hypertensive gastropathy, ascites, SBP, hepatic encephalopathy, GAVE syndrome s/p APC, duodenal ulcer (5/2019), recent ESBL E coli prostate abscess (3/2020 s/p 4 weeks of ertapenem), and orthostatic hypotension (on midodrine) presenting with decompensated cirrhosis w/ a hospital course c/b MISA, hyponatremia, VRE UTI, acute blood loss anemia from GAVE syndrome, and hepatic encephalopathy    PLAN:  Neurologic: hepatic encephalopathy  -monitor mental status  -rifaxamin and lactulose for hepatic encephalopathy  -currently denies pain    Respiratory: no acute issues  -continuous pulse oximetry, saturating well on room air  -encourage IS, OOBTC    Cardiovascular: orthostatic hypotension  -currently hemodynamically stable  -on home midodrine 20mg TID  -monitor vital signs    Gastrointestinal/Nutrition: GIB, JEAN cirrhosis, GAVE syndrome  -NPO for OR  -Octreotide infusion and Protonix 40 mg IV BID for GAVE syndrome    Renal: urinary retention, hyponatremia  - Monitor I&Os  - Monitor electrolytes and replete as necessary  - Flomax for BPH    Heme: coagulopathy  - Trend H/H  - Epogen for anemia as per nephrology  - Venodynes for mechanical VTE prophylaxis, holding chemical VTE prophylaxis in the setting of coagulopathy/GIB    Infectious Disease: chronic UTI, H pylori  -c/w Bactrim for chronic UTI ppx  -discontinued clarithromycin and flagyl for H pylori in preparation for OR  -Meropenem x1 dose and Daptomycin x 14d to be given in OR as surgical ppx  -nystatin for thrush ppx    Endo: DM type II, HLD  -low-dose ISS q6hr, FS q6hr  -500mg solumedrol ordered, to be given in OR    Disposition:  OR today, return to SICU afterwards  Full code

## 2020-07-02 NOTE — BRIEF OPERATIVE NOTE - OPERATION/FINDINGS
donor liver transplant, open liver transplant, midline incision extending bilaterally   recipient hepatectomy performed (standard anatomy), right and left lobes mobilized, portal triad identified, right and left hepatic arteries ligated, common bile duct ligated, portal vein identified and ligated, supra and infrahepatic vena cava control obtained, patient placed on VV bypass and liver excised  donor liver -  supra-caval end to end anastomosis completed with 2-0 prolene suture, followed by infra-caval end to end anastomosis with 2-0 prolene, portal vein anastomosis completed and patient taken off bypass, hepatic artery anastomosis completed in end to end fashion with 5-0 prolene suture, and common bile duct with end to end anastomosis and 5Fr T-tube placed, cholangiogram performed with adequate filling in both directions   hemostasis was achieved, encountered variceal bleeding that was suture ligated   fascia closed with loop PDS and skin stapled closed   left groin cut down to acces saphenofemoral junction for VV bypass cannulation  Drain #1 - right retrohepatic space   Drain #2 - posterior to portal vein anastomosis   Drain #3 - left retrohepatic space     14U pRBC  14U FFP  10 plts  11 cryo   500mL returned from cell saver   EBL 5L   UOP 1100mL OLTx - excellent reperfusion.  standard arterial anatomy.  Small-to-big duct size mismatch.

## 2020-07-02 NOTE — PROGRESS NOTE ADULT - SUBJECTIVE AND OBJECTIVE BOX
Transplant Surgery - POC  --------------------------------------------------------------   Donor OLTx      Date:  2020       POD#0    64M with IDDM, HLD, obesity, HFpEF with mild LV diastolic dysfunction, MGUS, chronic anemia with a history of duodenal ulcer as well as GAVE and duodenal AVM s/p APC (last on 10/11/19), 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  Multiple recent hospitalizations due to UTIs, emphysematous cystitis (2020) and prostate abscess (3/2020).     Re-admitted on :   ·	worsening HE, listed at MELD 32  ·	UTI/VRE: tx with linezolid 6/15-, now on bactrim DS  -    ·	MISA/Hyponatremia  ·	UGI bleed (melena) s/p EGD () c/w hemorrhagic duodenitis/gastritis; Grade 2 EV; requiring multiple transfusions  ·	Known h/o R foot ulcer (MRI neg for OM)    Now s/p  donor OLTx on 2020  ·	recipient hepatectomy  ·	L groin cutdown for vv bypass   ·	anastomosis: bicaval end-end/CBD duct-duct/HA & PV end-end, all standard anatomy.    ·	IOC with good flow  ·	Simulect induction. 500mg Solumedrol  ·	JPs x3 with T-Tube  ·	14U pRBC, 14U FFP, 10 PLT, 11 CRYO, 500mL returned from cell saver, EBL 5L, UOP 1100mL    Interval events:  POD#0  -intubated/sedated, no pressors: FIO2 40%, PEEP 8,   -hypothermia to 94F, improving with warmer. BP 160s, without relief from IV pushes, now improved on cardene gtt 2.5  -CVP 8  -ANDREINA 1: 170, ANDREINA 2: 65, ANDREINA 3: 50, UO: 1850 via bob  -u/s at bedside with excellent flow    ===========================  Recipient Info:  ABO: A  CMV:  Neg  EBV Positive    Donor:  [facility]  Donor ID:  [id number] Match: [id number]  OPO: NYRT  Age: 29  ABO:  A1  High Risk:   No  COD: Cardiovascular  X Clamp time TBD  Medical Hx: Drug Abuse Positive cocaine, Negative: Barbituates, Methadone, Opiates, Phencyclidine, THC.    Covid Testing: Negative 2020 via bronchoalveolar lavage  Anti CMV positive  EBV IgG- positive  HepBcAb-neg  Hepatitis C-DANA- neg  Hepatitis C ab-neg  ============================    Potential Discharge date: pending clinical stability    Education:  pending post-extubation    Plan of care:  See Below    MEDICATIONS  (STANDING):  chlorhexidine 0.12% Liquid 15 milliLiter(s) Oral Mucosa every 12 hours  chlorhexidine 2% Cloths 1 Application(s) Topical <User Schedule>  chlorhexidine 4% Liquid 1 Application(s) Topical <User Schedule>  DAPTOmycin IVPB 400 milliGRAM(s) IV Intermittent every 24 hours  dexMEDEtomidine Infusion 0.2 MICROgram(s)/kG/Hr (5.15 mL/Hr) IV Continuous <Continuous>  dextrose 5% + sodium chloride 0.45%. 1000 milliLiter(s) (125 mL/Hr) IV Continuous <Continuous>  fentaNYL   Infusion... 1 MICROgram(s)/kG/Hr (5.15 mL/Hr) IV Continuous <Continuous>  insulin regular Infusion 5 Unit(s)/Hr (5 mL/Hr) IV Continuous <Continuous>  meropenem  IVPB 1000 milliGRAM(s) IV Intermittent every 8 hours  niCARdipine Infusion 5 mG/Hr (25 mL/Hr) IV Continuous <Continuous>  nystatin Cream 1 Application(s) Topical two times a day  octreotide  Infusion 40 MICROgram(s)/Hr (8 mL/Hr) IV Continuous <Continuous>    MEDICATIONS  (PRN):  labetalol Injectable 10 milliGRAM(s) IV Push every 1 hour PRN systolic blood pressure GREATER THAN 170 mmHg or diastolic blood pressure GREATER THAN 100 mmHg  sodium chloride 0.9% lock flush 10 milliLiter(s) IV Push every 1 hour PRN Pre/post blood products, medications, blood draw, and to maintain line patency      PAST MEDICAL & SURGICAL HISTORY:  GIB (gastrointestinal bleeding)  GERD with esophagitis: Gastritis &amp; Non Bleeding Ulcers  Hepatic encephalopathy  Obesity  Fatty liver disease, nonalcoholic  Renal stones: 25 years ago  Hypertension  Neuropathy  Hypercholesteremia  Diabetes  S/P cholecystectomy      Vital Signs Last 24 Hrs  T(C): 34.5 (2020 23:00), Max: 36.3 (2020 03:00)  T(F): 94.1 (2020 23:00), Max: 97.3 (2020 03:00)  HR: 54 (2020 23:30) (54 - 73)  BP: 121/59 (2020 23:15) (121/59 - 164/77)  BP(mean): 82 (2020 23:15) (82 - 110)  RR: 19 (2020 23:30) (0 - 23)  SpO2: 100% (2020 23:30) (98% - 100%)    I&O's Summary    2020 07:01  -  2020 07:00  --------------------------------------------------------  IN: 1777.5 mL / OUT: 2200 mL / NET: -422.5 mL    2020 07:01  -  2020 23:42  --------------------------------------------------------  IN: 724.4 mL / OUT: 2320 mL / NET: -1595.6 mL                              10.5   12.40 )-----------( 176      ( 2020 20:14 )             29.5     07-02    137  |  100  |  38<H>  ----------------------------<  192<H>  3.9   |  24  |  1.15    Ca    11.9<H>      2020 20:14  Phos  4.0     07-02  Mg     2.0     07-02    TPro  5.4<L>  /  Alb  3.3  /  TBili  8.5<H>  /  DBili  4.8<H>  /  AST  1041<H>  /  ALT  601<H>  /  AlkPhos  60  07-    =========================    Review of systems  intubated, sedated    PHYSICAL EXAM:  Constitutional: Well developed / well nourished  Eyes: mild icterus  ENMT: NC/AT, ET tube in place  Neck: supple, L swan c/d/i  Respiratory: CTA b/l  Cardiovascular: RRR  Gastrointestinal: Soft abdomen, ND, no incisional pain through sedation. chevron incision c/d/i, staples in place. No signs of infection. T-tube draining minimal bile to bag.  JPs x3--all ss.  Genitourinary: Urinary catheter in place with good UO  Extremities: SCD's in place and working bilaterally. L groin cutdown site c/d/i. R a-line c/d/i.  Vascular: Palpable dp pulses bilaterally.   Neurological: intubated, sedated  Skin: no rashes, ulcerations, lesions post-op  Musculoskeletal: sedated  Psychiatric: sedated Transplant Surgery - POC  --------------------------------------------------------------   Donor OLTx      Date:  2020       POD#0    64M with IDDM, HLD, obesity, HFpEF with mild LV diastolic dysfunction, MGUS, chronic anemia with a history of duodenal ulcer as well as GAVE and duodenal AVM s/p APC (last on 10/11/19), 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  Multiple recent hospitalizations due to UTIs, emphysematous cystitis (2020) and prostate abscess (3/2020).     Re-admitted on :   ·	worsening HE, listed at MELD 32  ·	UTI/VRE: tx with linezolid 6/15-, now on bactrim DS  -    ·	MISA/Hyponatremia  ·	UGI bleed (melena) s/p EGD () c/w hemorrhagic duodenitis/gastritis; Grade 2 EV; requiring multiple transfusions  ·	Known h/o R foot ulcer (MRI neg for OM)    Now s/p  donor OLTx on 2020  ·	recipient hepatectomy  ·	L groin cutdown for vv bypass   ·	anastomosis: bicaval end-end/CBD duct-duct/HA & PV end-end, all standard anatomy.    ·	IOC with good flow  ·	Simulect induction. 500mg Solumedrol  ·	JPs x3 with T-Tube  ·	14U pRBC, 14U FFP, 10 PLT, 11 CRYO, 500mL returned from cell saver, EBL 5L, UOP 1100mL    Interval events:  POD#0  -intubated/sedated, no pressors: FIO2 40%, PEEP 5,   -hypothermia to 94F, improving with warmer. BP 160s, without relief from IV pushes, now improved on cardene gtt 2.5  -CVP 8  -ANDREINA 1: 170, ANDREINA 2: 65, ANDREINA 3: 50, UO: 1850 via bob  -u/s at bedside with excellent flow    ===========================  Recipient Info:  ABO: A  CMV:  Neg  EBV Positive    Donor:  [facility]  Donor ID:  [id number] Match: [id number]  OPO: NYRT  Age: 29  ABO:  A1  High Risk:   No  COD: Cardiovascular  X Clamp time TBD  Medical Hx: Drug Abuse Positive cocaine, Negative: Barbituates, Methadone, Opiates, Phencyclidine, THC.    Covid Testing: Negative 2020 via bronchoalveolar lavage  Anti CMV positive  EBV IgG- positive  HepBcAb-neg  Hepatitis C-DANA- neg  Hepatitis C ab-neg  ============================    Potential Discharge date: pending clinical stability    Education:  pending post-extubation    Plan of care:  See Below    MEDICATIONS  (STANDING):  chlorhexidine 0.12% Liquid 15 milliLiter(s) Oral Mucosa every 12 hours  chlorhexidine 2% Cloths 1 Application(s) Topical <User Schedule>  chlorhexidine 4% Liquid 1 Application(s) Topical <User Schedule>  DAPTOmycin IVPB 400 milliGRAM(s) IV Intermittent every 24 hours  dexMEDEtomidine Infusion 0.2 MICROgram(s)/kG/Hr (5.15 mL/Hr) IV Continuous <Continuous>  dextrose 5% + sodium chloride 0.45%. 1000 milliLiter(s) (125 mL/Hr) IV Continuous <Continuous>  fentaNYL   Infusion... 1 MICROgram(s)/kG/Hr (5.15 mL/Hr) IV Continuous <Continuous>  insulin regular Infusion 5 Unit(s)/Hr (5 mL/Hr) IV Continuous <Continuous>  meropenem  IVPB 1000 milliGRAM(s) IV Intermittent every 8 hours  niCARdipine Infusion 5 mG/Hr (25 mL/Hr) IV Continuous <Continuous>  nystatin Cream 1 Application(s) Topical two times a day  octreotide  Infusion 40 MICROgram(s)/Hr (8 mL/Hr) IV Continuous <Continuous>    MEDICATIONS  (PRN):  labetalol Injectable 10 milliGRAM(s) IV Push every 1 hour PRN systolic blood pressure GREATER THAN 170 mmHg or diastolic blood pressure GREATER THAN 100 mmHg  sodium chloride 0.9% lock flush 10 milliLiter(s) IV Push every 1 hour PRN Pre/post blood products, medications, blood draw, and to maintain line patency      PAST MEDICAL & SURGICAL HISTORY:  GIB (gastrointestinal bleeding)  GERD with esophagitis: Gastritis &amp; Non Bleeding Ulcers  Hepatic encephalopathy  Obesity  Fatty liver disease, nonalcoholic  Renal stones: 25 years ago  Hypertension  Neuropathy  Hypercholesteremia  Diabetes  S/P cholecystectomy      Vital Signs Last 24 Hrs  T(C): 34.5 (2020 23:00), Max: 36.3 (2020 03:00)  T(F): 94.1 (2020 23:00), Max: 97.3 (2020 03:00)  HR: 54 (2020 23:30) (54 - 73)  BP: 121/59 (2020 23:15) (121/59 - 164/77)  BP(mean): 82 (2020 23:15) (82 - 110)  RR: 19 (2020 23:30) (0 - 23)  SpO2: 100% (2020 23:30) (98% - 100%)    I&O's Summary    2020 07:01  -  2020 07:00  --------------------------------------------------------  IN: 1777.5 mL / OUT: 2200 mL / NET: -422.5 mL    2020 07:01  -  2020 23:42  --------------------------------------------------------  IN: 724.4 mL / OUT: 2320 mL / NET: -1595.6 mL                              10.5   12.40 )-----------( 176      ( 2020 20:14 )             29.5     07-02    137  |  100  |  38<H>  ----------------------------<  192<H>  3.9   |  24  |  1.15    Ca    11.9<H>      2020 20:14  Phos  4.0     07-02  Mg     2.0     07-02    TPro  5.4<L>  /  Alb  3.3  /  TBili  8.5<H>  /  DBili  4.8<H>  /  AST  1041<H>  /  ALT  601<H>  /  AlkPhos  60  07-    =========================    Review of systems  intubated, sedated    PHYSICAL EXAM:  Constitutional: Well developed / well nourished  Eyes: mild icterus  ENMT: NC/AT, ET tube in place  Neck: supple, L swan c/d/i  Respiratory: CTA b/l  Cardiovascular: RRR  Gastrointestinal: Soft abdomen, ND, no incisional pain through sedation. chevron incision c/d/i, staples in place. No signs of infection. T-tube draining minimal bile to bag.  JPs x3--all ss.  Genitourinary: Urinary catheter in place with good UO  Extremities: SCD's in place and working bilaterally. L groin cutdown site c/d/i. R a-line c/d/i.  Vascular: Palpable dp pulses bilaterally.   Neurological: intubated, sedated  Skin: no rashes, ulcerations, lesions post-op  Musculoskeletal: sedated  Psychiatric: sedated

## 2020-07-02 NOTE — PROGRESS NOTE ADULT - ASSESSMENT
64 M hx of DM, HLD, GERD, HFpEF with mild LV diastolic dysfunction, and decompensated JEAN cirrhosis c/b ascites with hx of SBP, HE, duodenal ulcer, GAVE and duodenal AVM s/p APC (last on 10/11/19), UNOS listed for liver transplantation at [facility]. 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 He is now re-admitted with hepatic encephalopathy and VRE UTI, also with MISA on CKD. Pt going for liver transplant.    Impression:  #Cirrhosis 2/2 JEAN, decompensated by ascites; listed for liver transplant  - varices: grade II on EGD 6/22  - ascites: trace on U/S this admission  - HCC: neg on U/S this admission   -HE: + asterixis, on rifaximin and lactulose   - MELD-Na = 34 on 7/2  #GI bleed with acute blood loss anemia s/p 10U PRBCs (1 on 6/17, 2 on 6/21, 1 on 6/23, 2 on 6/24, 1 on 6/26, 1 on 6/27, 2 on 6/30) and EGD 6/22, bleeding from known GAVE  #R posterior heel wound w/ overlying eschar – no signs of infections, XR neg for gas, evaluated by podiatry and ID. MRI w/o contrast limited, but no overt signs of active infection.  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

## 2020-07-02 NOTE — PROGRESS NOTE ADULT - SUBJECTIVE AND OBJECTIVE BOX
Chief Complaint:  Patient is a 64y old  Male who presents with a chief complaint of Liver failure, hepatic encephalopathy (2020 00:25)    Reason for consult: cirrhosis    Interval Events: Pt going for liver transplant today, required 1u pRBC overnight.     Hospital Medications:  chlorhexidine 0.12% Liquid 15 milliLiter(s) Swish and Spit two times a day  chlorhexidine 2% Cloths 1 Application(s) Topical <User Schedule>  DAPTOmycin IVPB 600 milliGRAM(s) IV Intermittent every 24 hours  epoetin barry-epbx (RETACRIT) Injectable 17714 Unit(s) SubCutaneous <User Schedule>  folic acid 1 milliGRAM(s) Oral daily  hepatitis B Vaccine - Adult (ENGERIX-B) 20 MICROGram(s) IntraMuscular once  insulin lispro (HumaLOG) corrective regimen sliding scale   SubCutaneous three times a day before meals  lactulose Syrup 30 Gram(s) Oral every 6 hours  meropenem  IVPB 500 milliGRAM(s) IV Intermittent once  methylPREDNISolone sodium succinate IVPB 500 milliGRAM(s) IV Intermittent once  midodrine. 20 milliGRAM(s) Oral three times a day  nystatin    Suspension 277499 Unit(s) Oral four times a day  nystatin Cream 1 Application(s) Topical two times a day  octreotide  Infusion 50 MICROgram(s)/Hr IV Continuous <Continuous>  ondansetron Injectable 4 milliGRAM(s) IV Push every 8 hours  pantoprazole  Injectable 40 milliGRAM(s) IV Push every 12 hours  rifAXIMin 550 milliGRAM(s) Oral two times a day  tamsulosin 0.4 milliGRAM(s) Oral at bedtime  trimethoprim  160 mG/sulfamethoxazole 800 mG 1 Tablet(s) Oral daily    ROS:   Unable to provide    PHYSICAL EXAM:   Vital Signs:  Vital Signs Last 24 Hrs  T(C): 36.3 (2020 03:00), Max: 36.9 (2020 11:00)  T(F): 97.3 (2020 03:00), Max: 98.4 (2020 11:00)  HR: 72 (2020 07:00) (68 - 79)  BP: 105/51 (2020 19:00) (101/50 - 126/60)  BP(mean): 73 (2020 19:00) (72 - 86)  RR: 18 (2020 07:00) (10 - 25)  SpO2: 100% (2020 07:00) (97% - 100%)  Daily Height in cm: 185 (2020 13:54)    Daily Weight in k.3 (2020 00:00)    Pt not examined as in OR    LABS: reviewed                        7.4    10.82 )-----------( 21       ( 2020 00:23 )             21.4     07-02    131<L>  |  100  |  51<H>  ----------------------------<  265<H>  5.1   |  23  |  1.42<H>    Ca    9.7      2020 00:23  Phos  2.8     07-02  Mg     1.8     07-02    TPro  4.8<L>  /  Alb  2.9<L>  /  TBili  14.2<H>  /  DBili  3.4<H>  /  AST  27  /  ALT  18  /  AlkPhos  82  07-02    LIVER FUNCTIONS - ( 2020 00:23 )  Alb: 2.9 g/dL / Pro: 4.8 g/dL / ALK PHOS: 82 U/L / ALT: 18 U/L / AST: 27 U/L / GGT: x             Interval Diagnostic Studies: see sunrise for full report

## 2020-07-02 NOTE — PROGRESS NOTE ADULT - ASSESSMENT
64M with IDDM, HLD, obesity, HFpEF with mild LV diastolic dysfunction, MGUS, chronic anemia with a history of duodenal ulcer as well as GAVE and duodenal AVM s/p APC (last on 10/11/19), decompensated JEAN/Cirrhosis (ascites/SBP/HE--UNOS listed MELD 32), h/o UTIs, emphysematous cystitis (2020) and prostate abscess (3/2020), re-admitted on  for HE, VRE UTI/GIB now s/p  donor OLT on 2020    POD#0 s/p OLTx  -post op labs: CBC/BMP/Coags all stable. LFTs/Ammonia appropriate--will trend. Lactate clearing, now down to 3 from 4. check full set q4h for now  -u/s with good flow, will hold off on serial studies for now  -stable vent settings, CXR in AM  -continue PAP/CVP via swan  -strict I&Os and VS (JPx3, T-tube to bag--must be secure and checked frequently, high risk of kinking)   -IVF: D5 1/2 NS @ 125 overnight  -DVT PPx: SCDs at all times. NO SQH/ASA overnight  -GI PPx given h/o GAVE:  Protonix IV QD, octreotide gtt @ 8 for now given h/o GAVE--will wean off over the next 2d  -ABX PPX given h/o VRE UTI:  Daptomycin/Meropenem x36h  -->IMMUNO: POD #1:  FK 4/4, MMF 1000BID, standard steroid taper, Simulect   -->TRANSPLANT PPx: POD#1:  Valcyte/Nystatin/Bactrim    HTN:  -wean off cardene gtt as able (goal BP <140s systolic)    IDDM:   -continue insulin gtt    DISPO:  -appreciate excellent SICU care      Transplant Surgery  [phone] until 7a  pager [phone] afterwards

## 2020-07-03 LAB
ALBUMIN SERPL ELPH-MCNC: 3 G/DL — LOW (ref 3.3–5)
ALBUMIN SERPL ELPH-MCNC: 3.1 G/DL — LOW (ref 3.3–5)
ALBUMIN SERPL ELPH-MCNC: 3.2 G/DL — LOW (ref 3.3–5)
ALP SERPL-CCNC: 53 U/L — SIGNIFICANT CHANGE UP (ref 40–120)
ALP SERPL-CCNC: 54 U/L — SIGNIFICANT CHANGE UP (ref 40–120)
ALP SERPL-CCNC: 54 U/L — SIGNIFICANT CHANGE UP (ref 40–120)
ALP SERPL-CCNC: 55 U/L — SIGNIFICANT CHANGE UP (ref 40–120)
ALP SERPL-CCNC: 59 U/L — SIGNIFICANT CHANGE UP (ref 40–120)
ALT FLD-CCNC: 363 U/L — HIGH (ref 10–45)
ALT FLD-CCNC: 399 U/L — HIGH (ref 10–45)
ALT FLD-CCNC: 456 U/L — HIGH (ref 10–45)
ALT FLD-CCNC: 496 U/L — HIGH (ref 10–45)
ALT FLD-CCNC: 554 U/L — HIGH (ref 10–45)
AMMONIA BLD-MCNC: 67 UMOL/L — HIGH (ref 11–55)
ANION GAP SERPL CALC-SCNC: 10 MMOL/L — SIGNIFICANT CHANGE UP (ref 5–17)
ANION GAP SERPL CALC-SCNC: 11 MMOL/L — SIGNIFICANT CHANGE UP (ref 5–17)
ANION GAP SERPL CALC-SCNC: 8 MMOL/L — SIGNIFICANT CHANGE UP (ref 5–17)
ANION GAP SERPL CALC-SCNC: 9 MMOL/L — SIGNIFICANT CHANGE UP (ref 5–17)
ANION GAP SERPL CALC-SCNC: 9 MMOL/L — SIGNIFICANT CHANGE UP (ref 5–17)
APPEARANCE UR: CLEAR — SIGNIFICANT CHANGE UP
APTT BLD: 25.8 SEC — LOW (ref 27.5–35.5)
APTT BLD: 25.9 SEC — LOW (ref 27.5–35.5)
APTT BLD: 26.5 SEC — LOW (ref 27.5–35.5)
APTT BLD: 28 SEC — SIGNIFICANT CHANGE UP (ref 27.5–35.5)
APTT BLD: 28.2 SEC — SIGNIFICANT CHANGE UP (ref 27.5–35.5)
AST SERPL-CCNC: 1098 U/L — HIGH (ref 10–40)
AST SERPL-CCNC: 246 U/L — HIGH (ref 10–40)
AST SERPL-CCNC: 354 U/L — HIGH (ref 10–40)
AST SERPL-CCNC: 546 U/L — HIGH (ref 10–40)
AST SERPL-CCNC: 753 U/L — HIGH (ref 10–40)
BACTERIA # UR AUTO: NEGATIVE — SIGNIFICANT CHANGE UP
BILIRUB DIRECT SERPL-MCNC: 0.9 MG/DL — HIGH (ref 0–0.2)
BILIRUB DIRECT SERPL-MCNC: 1.1 MG/DL — HIGH (ref 0–0.2)
BILIRUB DIRECT SERPL-MCNC: 1.4 MG/DL — HIGH (ref 0–0.2)
BILIRUB DIRECT SERPL-MCNC: 1.5 MG/DL — HIGH (ref 0–0.2)
BILIRUB DIRECT SERPL-MCNC: 2.7 MG/DL — HIGH (ref 0–0.2)
BILIRUB FLD-MCNC: 3 MG/DL — SIGNIFICANT CHANGE UP
BILIRUB FLD-MCNC: 3.3 MG/DL — SIGNIFICANT CHANGE UP
BILIRUB FLD-MCNC: 3.4 MG/DL — SIGNIFICANT CHANGE UP
BILIRUB INDIRECT FLD-MCNC: 1.3 MG/DL — HIGH (ref 0.2–1)
BILIRUB INDIRECT FLD-MCNC: 1.8 MG/DL — HIGH (ref 0.2–1)
BILIRUB INDIRECT FLD-MCNC: 1.9 MG/DL — HIGH (ref 0.2–1)
BILIRUB INDIRECT FLD-MCNC: 2.4 MG/DL — HIGH (ref 0.2–1)
BILIRUB INDIRECT FLD-MCNC: 3.1 MG/DL — HIGH (ref 0.2–1)
BILIRUB SERPL-MCNC: 2.2 MG/DL — HIGH (ref 0.2–1.2)
BILIRUB SERPL-MCNC: 3 MG/DL — HIGH (ref 0.2–1.2)
BILIRUB SERPL-MCNC: 3.2 MG/DL — HIGH (ref 0.2–1.2)
BILIRUB SERPL-MCNC: 3.9 MG/DL — HIGH (ref 0.2–1.2)
BILIRUB SERPL-MCNC: 5.8 MG/DL — HIGH (ref 0.2–1.2)
BILIRUB UR-MCNC: NEGATIVE — SIGNIFICANT CHANGE UP
BUN SERPL-MCNC: 33 MG/DL — HIGH (ref 7–23)
BUN SERPL-MCNC: 34 MG/DL — HIGH (ref 7–23)
BUN SERPL-MCNC: 36 MG/DL — HIGH (ref 7–23)
BUN SERPL-MCNC: 36 MG/DL — HIGH (ref 7–23)
BUN SERPL-MCNC: 37 MG/DL — HIGH (ref 7–23)
CALCIUM SERPL-MCNC: 10.2 MG/DL — SIGNIFICANT CHANGE UP (ref 8.4–10.5)
CALCIUM SERPL-MCNC: 10.5 MG/DL — SIGNIFICANT CHANGE UP (ref 8.4–10.5)
CALCIUM SERPL-MCNC: 10.6 MG/DL — HIGH (ref 8.4–10.5)
CALCIUM SERPL-MCNC: 11 MG/DL — HIGH (ref 8.4–10.5)
CALCIUM SERPL-MCNC: 11.4 MG/DL — HIGH (ref 8.4–10.5)
CHLORIDE SERPL-SCNC: 101 MMOL/L — SIGNIFICANT CHANGE UP (ref 96–108)
CHLORIDE SERPL-SCNC: 101 MMOL/L — SIGNIFICANT CHANGE UP (ref 96–108)
CHLORIDE SERPL-SCNC: 102 MMOL/L — SIGNIFICANT CHANGE UP (ref 96–108)
CHLORIDE SERPL-SCNC: 103 MMOL/L — SIGNIFICANT CHANGE UP (ref 96–108)
CHLORIDE SERPL-SCNC: 103 MMOL/L — SIGNIFICANT CHANGE UP (ref 96–108)
CO2 SERPL-SCNC: 25 MMOL/L — SIGNIFICANT CHANGE UP (ref 22–31)
CO2 SERPL-SCNC: 25 MMOL/L — SIGNIFICANT CHANGE UP (ref 22–31)
CO2 SERPL-SCNC: 27 MMOL/L — SIGNIFICANT CHANGE UP (ref 22–31)
COLOR SPEC: YELLOW — SIGNIFICANT CHANGE UP
CREAT SERPL-MCNC: 1.12 MG/DL — SIGNIFICANT CHANGE UP (ref 0.5–1.3)
CREAT SERPL-MCNC: 1.15 MG/DL — SIGNIFICANT CHANGE UP (ref 0.5–1.3)
CREAT SERPL-MCNC: 1.15 MG/DL — SIGNIFICANT CHANGE UP (ref 0.5–1.3)
CREAT SERPL-MCNC: 1.16 MG/DL — SIGNIFICANT CHANGE UP (ref 0.5–1.3)
CREAT SERPL-MCNC: 1.28 MG/DL — SIGNIFICANT CHANGE UP (ref 0.5–1.3)
DIFF PNL FLD: ABNORMAL
EPI CELLS # UR: 0 /HPF — SIGNIFICANT CHANGE UP
FIBRINOGEN PPP-MCNC: 313 MG/DL — LOW (ref 350–510)
FIBRINOGEN PPP-MCNC: 329 MG/DL — LOW (ref 350–510)
FIBRINOGEN PPP-MCNC: 347 MG/DL — LOW (ref 350–510)
FIBRINOGEN PPP-MCNC: 358 MG/DL — SIGNIFICANT CHANGE UP (ref 350–510)
FIBRINOGEN PPP-MCNC: 384 MG/DL — SIGNIFICANT CHANGE UP (ref 350–510)
GAS PNL BLDA: SIGNIFICANT CHANGE UP
GGT SERPL-CCNC: 42 U/L — SIGNIFICANT CHANGE UP (ref 9–50)
GGT SERPL-CCNC: 49 U/L — SIGNIFICANT CHANGE UP (ref 9–50)
GGT SERPL-CCNC: 52 U/L — HIGH (ref 9–50)
GLUCOSE BLDC GLUCOMTR-MCNC: 111 MG/DL — HIGH (ref 70–99)
GLUCOSE BLDC GLUCOMTR-MCNC: 120 MG/DL — HIGH (ref 70–99)
GLUCOSE BLDC GLUCOMTR-MCNC: 121 MG/DL — HIGH (ref 70–99)
GLUCOSE BLDC GLUCOMTR-MCNC: 131 MG/DL — HIGH (ref 70–99)
GLUCOSE BLDC GLUCOMTR-MCNC: 133 MG/DL — HIGH (ref 70–99)
GLUCOSE BLDC GLUCOMTR-MCNC: 140 MG/DL — HIGH (ref 70–99)
GLUCOSE BLDC GLUCOMTR-MCNC: 146 MG/DL — HIGH (ref 70–99)
GLUCOSE BLDC GLUCOMTR-MCNC: 156 MG/DL — HIGH (ref 70–99)
GLUCOSE BLDC GLUCOMTR-MCNC: 168 MG/DL — HIGH (ref 70–99)
GLUCOSE BLDC GLUCOMTR-MCNC: 173 MG/DL — HIGH (ref 70–99)
GLUCOSE BLDC GLUCOMTR-MCNC: 183 MG/DL — HIGH (ref 70–99)
GLUCOSE BLDC GLUCOMTR-MCNC: 186 MG/DL — HIGH (ref 70–99)
GLUCOSE BLDC GLUCOMTR-MCNC: 187 MG/DL — HIGH (ref 70–99)
GLUCOSE BLDC GLUCOMTR-MCNC: 188 MG/DL — HIGH (ref 70–99)
GLUCOSE BLDC GLUCOMTR-MCNC: 189 MG/DL — HIGH (ref 70–99)
GLUCOSE BLDC GLUCOMTR-MCNC: 191 MG/DL — HIGH (ref 70–99)
GLUCOSE BLDC GLUCOMTR-MCNC: 195 MG/DL — HIGH (ref 70–99)
GLUCOSE BLDC GLUCOMTR-MCNC: 197 MG/DL — HIGH (ref 70–99)
GLUCOSE BLDC GLUCOMTR-MCNC: 197 MG/DL — HIGH (ref 70–99)
GLUCOSE BLDC GLUCOMTR-MCNC: 200 MG/DL — HIGH (ref 70–99)
GLUCOSE BLDC GLUCOMTR-MCNC: 201 MG/DL — HIGH (ref 70–99)
GLUCOSE BLDC GLUCOMTR-MCNC: 201 MG/DL — HIGH (ref 70–99)
GLUCOSE BLDC GLUCOMTR-MCNC: 218 MG/DL — HIGH (ref 70–99)
GLUCOSE BLDC GLUCOMTR-MCNC: 231 MG/DL — HIGH (ref 70–99)
GLUCOSE BLDC GLUCOMTR-MCNC: 96 MG/DL — SIGNIFICANT CHANGE UP (ref 70–99)
GLUCOSE BLDC GLUCOMTR-MCNC: 99 MG/DL — SIGNIFICANT CHANGE UP (ref 70–99)
GLUCOSE SERPL-MCNC: 141 MG/DL — HIGH (ref 70–99)
GLUCOSE SERPL-MCNC: 193 MG/DL — HIGH (ref 70–99)
GLUCOSE SERPL-MCNC: 193 MG/DL — HIGH (ref 70–99)
GLUCOSE SERPL-MCNC: 198 MG/DL — HIGH (ref 70–99)
GLUCOSE SERPL-MCNC: 213 MG/DL — HIGH (ref 70–99)
GLUCOSE UR QL: NEGATIVE — SIGNIFICANT CHANGE UP
HCT VFR BLD CALC: 26.1 % — LOW (ref 39–50)
HCT VFR BLD CALC: 28.7 % — LOW (ref 39–50)
HCT VFR BLD CALC: 29 % — LOW (ref 39–50)
HCT VFR BLD CALC: 29.1 % — LOW (ref 39–50)
HCT VFR BLD CALC: 29.4 % — LOW (ref 39–50)
HGB BLD-MCNC: 10.3 G/DL — LOW (ref 13–17)
HGB BLD-MCNC: 10.3 G/DL — LOW (ref 13–17)
HGB BLD-MCNC: 10.4 G/DL — LOW (ref 13–17)
HGB BLD-MCNC: 10.8 G/DL — LOW (ref 13–17)
HGB BLD-MCNC: 9.4 G/DL — LOW (ref 13–17)
HYALINE CASTS # UR AUTO: 2 /LPF — SIGNIFICANT CHANGE UP (ref 0–2)
INR BLD: 1.27 RATIO — HIGH (ref 0.88–1.16)
INR BLD: 1.31 RATIO — HIGH (ref 0.88–1.16)
INR BLD: 1.32 RATIO — HIGH (ref 0.88–1.16)
INR BLD: 1.38 RATIO — HIGH (ref 0.88–1.16)
INR BLD: 1.41 RATIO — HIGH (ref 0.88–1.16)
KETONES UR-MCNC: NEGATIVE — SIGNIFICANT CHANGE UP
LEUKOCYTE ESTERASE UR-ACNC: NEGATIVE — SIGNIFICANT CHANGE UP
MAGNESIUM SERPL-MCNC: 1.8 MG/DL — SIGNIFICANT CHANGE UP (ref 1.6–2.6)
MAGNESIUM SERPL-MCNC: 1.9 MG/DL — SIGNIFICANT CHANGE UP (ref 1.6–2.6)
MAGNESIUM SERPL-MCNC: 1.9 MG/DL — SIGNIFICANT CHANGE UP (ref 1.6–2.6)
MAGNESIUM SERPL-MCNC: 2 MG/DL — SIGNIFICANT CHANGE UP (ref 1.6–2.6)
MAGNESIUM SERPL-MCNC: 2 MG/DL — SIGNIFICANT CHANGE UP (ref 1.6–2.6)
MCHC RBC-ENTMCNC: 30.7 PG — SIGNIFICANT CHANGE UP (ref 27–34)
MCHC RBC-ENTMCNC: 30.8 PG — SIGNIFICANT CHANGE UP (ref 27–34)
MCHC RBC-ENTMCNC: 30.9 PG — SIGNIFICANT CHANGE UP (ref 27–34)
MCHC RBC-ENTMCNC: 30.9 PG — SIGNIFICANT CHANGE UP (ref 27–34)
MCHC RBC-ENTMCNC: 31.3 PG — SIGNIFICANT CHANGE UP (ref 27–34)
MCHC RBC-ENTMCNC: 35.4 GM/DL — SIGNIFICANT CHANGE UP (ref 32–36)
MCHC RBC-ENTMCNC: 35.9 GM/DL — SIGNIFICANT CHANGE UP (ref 32–36)
MCHC RBC-ENTMCNC: 35.9 GM/DL — SIGNIFICANT CHANGE UP (ref 32–36)
MCHC RBC-ENTMCNC: 36 GM/DL — SIGNIFICANT CHANGE UP (ref 32–36)
MCHC RBC-ENTMCNC: 36.7 GM/DL — HIGH (ref 32–36)
MCV RBC AUTO: 85.2 FL — SIGNIFICANT CHANGE UP (ref 80–100)
MCV RBC AUTO: 85.4 FL — SIGNIFICANT CHANGE UP (ref 80–100)
MCV RBC AUTO: 85.9 FL — SIGNIFICANT CHANGE UP (ref 80–100)
MCV RBC AUTO: 86.1 FL — SIGNIFICANT CHANGE UP (ref 80–100)
MCV RBC AUTO: 87.1 FL — SIGNIFICANT CHANGE UP (ref 80–100)
NITRITE UR-MCNC: NEGATIVE — SIGNIFICANT CHANGE UP
NRBC # BLD: 0 /100 WBCS — SIGNIFICANT CHANGE UP (ref 0–0)
PH UR: 6 — SIGNIFICANT CHANGE UP (ref 5–8)
PHOSPHATE SERPL-MCNC: 3.8 MG/DL — SIGNIFICANT CHANGE UP (ref 2.5–4.5)
PHOSPHATE SERPL-MCNC: 3.9 MG/DL — SIGNIFICANT CHANGE UP (ref 2.5–4.5)
PHOSPHATE SERPL-MCNC: 4 MG/DL — SIGNIFICANT CHANGE UP (ref 2.5–4.5)
PHOSPHATE SERPL-MCNC: 4.1 MG/DL — SIGNIFICANT CHANGE UP (ref 2.5–4.5)
PHOSPHATE SERPL-MCNC: 5 MG/DL — HIGH (ref 2.5–4.5)
PLATELET # BLD AUTO: 125 K/UL — LOW (ref 150–400)
PLATELET # BLD AUTO: 127 K/UL — LOW (ref 150–400)
PLATELET # BLD AUTO: 137 K/UL — LOW (ref 150–400)
PLATELET # BLD AUTO: 145 K/UL — LOW (ref 150–400)
PLATELET # BLD AUTO: 147 K/UL — LOW (ref 150–400)
POTASSIUM SERPL-MCNC: 3.8 MMOL/L — SIGNIFICANT CHANGE UP (ref 3.5–5.3)
POTASSIUM SERPL-MCNC: 4 MMOL/L — SIGNIFICANT CHANGE UP (ref 3.5–5.3)
POTASSIUM SERPL-MCNC: 4 MMOL/L — SIGNIFICANT CHANGE UP (ref 3.5–5.3)
POTASSIUM SERPL-MCNC: 4.3 MMOL/L — SIGNIFICANT CHANGE UP (ref 3.5–5.3)
POTASSIUM SERPL-MCNC: 4.4 MMOL/L — SIGNIFICANT CHANGE UP (ref 3.5–5.3)
POTASSIUM SERPL-SCNC: 3.8 MMOL/L — SIGNIFICANT CHANGE UP (ref 3.5–5.3)
POTASSIUM SERPL-SCNC: 4 MMOL/L — SIGNIFICANT CHANGE UP (ref 3.5–5.3)
POTASSIUM SERPL-SCNC: 4 MMOL/L — SIGNIFICANT CHANGE UP (ref 3.5–5.3)
POTASSIUM SERPL-SCNC: 4.3 MMOL/L — SIGNIFICANT CHANGE UP (ref 3.5–5.3)
POTASSIUM SERPL-SCNC: 4.4 MMOL/L — SIGNIFICANT CHANGE UP (ref 3.5–5.3)
PROT SERPL-MCNC: 4.9 G/DL — LOW (ref 6–8.3)
PROT SERPL-MCNC: 4.9 G/DL — LOW (ref 6–8.3)
PROT SERPL-MCNC: 5.2 G/DL — LOW (ref 6–8.3)
PROT SERPL-MCNC: 5.2 G/DL — LOW (ref 6–8.3)
PROT SERPL-MCNC: 5.4 G/DL — LOW (ref 6–8.3)
PROT UR-MCNC: SIGNIFICANT CHANGE UP
PROTHROM AB SERPL-ACNC: 14.4 SEC — HIGH (ref 10.6–13.6)
PROTHROM AB SERPL-ACNC: 14.9 SEC — HIGH (ref 10.6–13.6)
PROTHROM AB SERPL-ACNC: 15 SEC — HIGH (ref 10.6–13.6)
PROTHROM AB SERPL-ACNC: 15.6 SEC — HIGH (ref 10.6–13.6)
PROTHROM AB SERPL-ACNC: 16 SEC — HIGH (ref 10.6–13.6)
RBC # BLD: 3.04 M/UL — LOW (ref 4.2–5.8)
RBC # BLD: 3.34 M/UL — LOW (ref 4.2–5.8)
RBC # BLD: 3.36 M/UL — LOW (ref 4.2–5.8)
RBC # BLD: 3.37 M/UL — LOW (ref 4.2–5.8)
RBC # BLD: 3.45 M/UL — LOW (ref 4.2–5.8)
RBC # FLD: 15 % — HIGH (ref 10.3–14.5)
RBC # FLD: 15.1 % — HIGH (ref 10.3–14.5)
RBC # FLD: 15.4 % — HIGH (ref 10.3–14.5)
RBC # FLD: 15.6 % — HIGH (ref 10.3–14.5)
RBC # FLD: 15.9 % — HIGH (ref 10.3–14.5)
RBC CASTS # UR COMP ASSIST: 10 /HPF — HIGH (ref 0–4)
SODIUM SERPL-SCNC: 137 MMOL/L — SIGNIFICANT CHANGE UP (ref 135–145)
SODIUM SERPL-SCNC: 138 MMOL/L — SIGNIFICANT CHANGE UP (ref 135–145)
SODIUM SERPL-SCNC: 139 MMOL/L — SIGNIFICANT CHANGE UP (ref 135–145)
SP GR SPEC: 1.02 — SIGNIFICANT CHANGE UP (ref 1.01–1.02)
SPECIMEN SOURCE FLD: SIGNIFICANT CHANGE UP
UROBILINOGEN FLD QL: NEGATIVE — SIGNIFICANT CHANGE UP
WBC # BLD: 10.43 K/UL — SIGNIFICANT CHANGE UP (ref 3.8–10.5)
WBC # BLD: 10.44 K/UL — SIGNIFICANT CHANGE UP (ref 3.8–10.5)
WBC # BLD: 12.12 K/UL — HIGH (ref 3.8–10.5)
WBC # BLD: 8.12 K/UL — SIGNIFICANT CHANGE UP (ref 3.8–10.5)
WBC # BLD: 8.43 K/UL — SIGNIFICANT CHANGE UP (ref 3.8–10.5)
WBC # FLD AUTO: 10.43 K/UL — SIGNIFICANT CHANGE UP (ref 3.8–10.5)
WBC # FLD AUTO: 10.44 K/UL — SIGNIFICANT CHANGE UP (ref 3.8–10.5)
WBC # FLD AUTO: 12.12 K/UL — HIGH (ref 3.8–10.5)
WBC # FLD AUTO: 8.12 K/UL — SIGNIFICANT CHANGE UP (ref 3.8–10.5)
WBC # FLD AUTO: 8.43 K/UL — SIGNIFICANT CHANGE UP (ref 3.8–10.5)
WBC UR QL: 5 /HPF — SIGNIFICANT CHANGE UP (ref 0–5)

## 2020-07-03 PROCEDURE — 99232 SBSQ HOSP IP/OBS MODERATE 35: CPT | Mod: GC

## 2020-07-03 PROCEDURE — 99291 CRITICAL CARE FIRST HOUR: CPT

## 2020-07-03 PROCEDURE — 99233 SBSQ HOSP IP/OBS HIGH 50: CPT

## 2020-07-03 PROCEDURE — 99233 SBSQ HOSP IP/OBS HIGH 50: CPT | Mod: GC

## 2020-07-03 PROCEDURE — 71045 X-RAY EXAM CHEST 1 VIEW: CPT | Mod: 26

## 2020-07-03 RX ORDER — MAGNESIUM SULFATE 500 MG/ML
2 VIAL (ML) INJECTION ONCE
Refills: 0 | Status: COMPLETED | OUTPATIENT
Start: 2020-07-03 | End: 2020-07-03

## 2020-07-03 RX ORDER — POTASSIUM CHLORIDE 20 MEQ
20 PACKET (EA) ORAL ONCE
Refills: 0 | Status: COMPLETED | OUTPATIENT
Start: 2020-07-03 | End: 2020-07-03

## 2020-07-03 RX ORDER — HYDROMORPHONE HYDROCHLORIDE 2 MG/ML
0.5 INJECTION INTRAMUSCULAR; INTRAVENOUS; SUBCUTANEOUS ONCE
Refills: 0 | Status: DISCONTINUED | OUTPATIENT
Start: 2020-07-03 | End: 2020-07-03

## 2020-07-03 RX ORDER — OCTREOTIDE ACETATE 200 UG/ML
10 INJECTION, SOLUTION INTRAVENOUS; SUBCUTANEOUS
Qty: 500 | Refills: 0 | Status: DISCONTINUED | OUTPATIENT
Start: 2020-07-03 | End: 2020-07-03

## 2020-07-03 RX ORDER — NICARDIPINE HYDROCHLORIDE 30 MG/1
5 CAPSULE, EXTENDED RELEASE ORAL
Qty: 40 | Refills: 0 | Status: DISCONTINUED | OUTPATIENT
Start: 2020-07-03 | End: 2020-07-04

## 2020-07-03 RX ORDER — HYDRALAZINE HCL 50 MG
5 TABLET ORAL ONCE
Refills: 0 | Status: COMPLETED | OUTPATIENT
Start: 2020-07-03 | End: 2020-07-02

## 2020-07-03 RX ORDER — HYDROMORPHONE HYDROCHLORIDE 2 MG/ML
1 INJECTION INTRAMUSCULAR; INTRAVENOUS; SUBCUTANEOUS ONCE
Refills: 0 | Status: DISCONTINUED | OUTPATIENT
Start: 2020-07-03 | End: 2020-07-03

## 2020-07-03 RX ORDER — FUROSEMIDE 40 MG
40 TABLET ORAL ONCE
Refills: 0 | Status: COMPLETED | OUTPATIENT
Start: 2020-07-03 | End: 2020-07-03

## 2020-07-03 RX ORDER — HYDROMORPHONE HYDROCHLORIDE 2 MG/ML
0.5 INJECTION INTRAMUSCULAR; INTRAVENOUS; SUBCUTANEOUS
Refills: 0 | Status: DISCONTINUED | OUTPATIENT
Start: 2020-07-03 | End: 2020-07-04

## 2020-07-03 RX ORDER — ALBUMIN HUMAN 25 %
250 VIAL (ML) INTRAVENOUS ONCE
Refills: 0 | Status: COMPLETED | OUTPATIENT
Start: 2020-07-03 | End: 2020-07-03

## 2020-07-03 RX ORDER — COLLAGENASE CLOSTRIDIUM HIST. 250 UNIT/G
1 OINTMENT (GRAM) TOPICAL DAILY
Refills: 0 | Status: DISCONTINUED | OUTPATIENT
Start: 2020-07-03 | End: 2020-08-11

## 2020-07-03 RX ORDER — BASILIXIMAB 20 MG/5ML
20 INJECTION, POWDER, FOR SOLUTION INTRAVENOUS ONCE
Refills: 0 | Status: COMPLETED | OUTPATIENT
Start: 2020-07-06 | End: 2020-07-06

## 2020-07-03 RX ADMIN — SODIUM CHLORIDE 125 MILLILITER(S): 9 INJECTION, SOLUTION INTRAVENOUS at 19:14

## 2020-07-03 RX ADMIN — MYCOPHENOLATE MOFETIL 1000 MILLIGRAM(S): 250 CAPSULE ORAL at 08:00

## 2020-07-03 RX ADMIN — CHLORHEXIDINE GLUCONATE 15 MILLILITER(S): 213 SOLUTION TOPICAL at 17:07

## 2020-07-03 RX ADMIN — TACROLIMUS 4 MILLIGRAM(S): 5 CAPSULE ORAL at 08:00

## 2020-07-03 RX ADMIN — DEXMEDETOMIDINE HYDROCHLORIDE IN 0.9% SODIUM CHLORIDE 5.15 MICROGRAM(S)/KG/HR: 4 INJECTION INTRAVENOUS at 11:28

## 2020-07-03 RX ADMIN — INSULIN HUMAN 5 UNIT(S)/HR: 100 INJECTION, SOLUTION SUBCUTANEOUS at 07:30

## 2020-07-03 RX ADMIN — VALGANCICLOVIR 450 MILLIGRAM(S): 450 TABLET, FILM COATED ORAL at 12:01

## 2020-07-03 RX ADMIN — Medication 100000 UNIT(S): at 05:07

## 2020-07-03 RX ADMIN — NYSTATIN CREAM 1 APPLICATION(S): 100000 CREAM TOPICAL at 17:08

## 2020-07-03 RX ADMIN — SODIUM CHLORIDE 125 MILLILITER(S): 9 INJECTION, SOLUTION INTRAVENOUS at 11:28

## 2020-07-03 RX ADMIN — CHLORHEXIDINE GLUCONATE 1 APPLICATION(S): 213 SOLUTION TOPICAL at 05:08

## 2020-07-03 RX ADMIN — INSULIN HUMAN 5 UNIT(S)/HR: 100 INJECTION, SOLUTION SUBCUTANEOUS at 19:14

## 2020-07-03 RX ADMIN — HYDROMORPHONE HYDROCHLORIDE 1 MILLIGRAM(S): 2 INJECTION INTRAMUSCULAR; INTRAVENOUS; SUBCUTANEOUS at 17:07

## 2020-07-03 RX ADMIN — TACROLIMUS 4 MILLIGRAM(S): 5 CAPSULE ORAL at 20:07

## 2020-07-03 RX ADMIN — Medication 125 MILLIGRAM(S): at 05:07

## 2020-07-03 RX ADMIN — Medication 50 GRAM(S): at 01:15

## 2020-07-03 RX ADMIN — SODIUM CHLORIDE 125 MILLILITER(S): 9 INJECTION, SOLUTION INTRAVENOUS at 16:03

## 2020-07-03 RX ADMIN — Medication 100000 UNIT(S): at 12:03

## 2020-07-03 RX ADMIN — MEROPENEM 100 MILLIGRAM(S): 1 INJECTION INTRAVENOUS at 05:07

## 2020-07-03 RX ADMIN — Medication 40 MILLIGRAM(S): at 17:07

## 2020-07-03 RX ADMIN — MEROPENEM 100 MILLIGRAM(S): 1 INJECTION INTRAVENOUS at 13:58

## 2020-07-03 RX ADMIN — HYDROMORPHONE HYDROCHLORIDE 0.5 MILLIGRAM(S): 2 INJECTION INTRAMUSCULAR; INTRAVENOUS; SUBCUTANEOUS at 11:28

## 2020-07-03 RX ADMIN — HYDROMORPHONE HYDROCHLORIDE 0.5 MILLIGRAM(S): 2 INJECTION INTRAMUSCULAR; INTRAVENOUS; SUBCUTANEOUS at 13:00

## 2020-07-03 RX ADMIN — DEXMEDETOMIDINE HYDROCHLORIDE IN 0.9% SODIUM CHLORIDE 5.15 MICROGRAM(S)/KG/HR: 4 INJECTION INTRAVENOUS at 19:14

## 2020-07-03 RX ADMIN — MYCOPHENOLATE MOFETIL 1000 MILLIGRAM(S): 250 CAPSULE ORAL at 20:07

## 2020-07-03 RX ADMIN — SODIUM CHLORIDE 125 MILLILITER(S): 9 INJECTION, SOLUTION INTRAVENOUS at 07:30

## 2020-07-03 RX ADMIN — DEXMEDETOMIDINE HYDROCHLORIDE IN 0.9% SODIUM CHLORIDE 5.15 MICROGRAM(S)/KG/HR: 4 INJECTION INTRAVENOUS at 07:30

## 2020-07-03 RX ADMIN — HYDROMORPHONE HYDROCHLORIDE 0.5 MILLIGRAM(S): 2 INJECTION INTRAMUSCULAR; INTRAVENOUS; SUBCUTANEOUS at 16:20

## 2020-07-03 RX ADMIN — CHLORHEXIDINE GLUCONATE 15 MILLILITER(S): 213 SOLUTION TOPICAL at 05:07

## 2020-07-03 RX ADMIN — Medication 100 MILLIEQUIVALENT(S): at 02:04

## 2020-07-03 RX ADMIN — Medication 80 MILLIGRAM(S): at 12:01

## 2020-07-03 RX ADMIN — Medication 1 APPLICATION(S): at 12:01

## 2020-07-03 RX ADMIN — PANTOPRAZOLE SODIUM 40 MILLIGRAM(S): 20 TABLET, DELAYED RELEASE ORAL at 05:08

## 2020-07-03 RX ADMIN — Medication 125 MILLIGRAM(S): at 17:07

## 2020-07-03 RX ADMIN — Medication 100000 UNIT(S): at 17:08

## 2020-07-03 RX ADMIN — DAPTOMYCIN 116 MILLIGRAM(S): 500 INJECTION, POWDER, LYOPHILIZED, FOR SOLUTION INTRAVENOUS at 07:59

## 2020-07-03 RX ADMIN — Medication 250 MILLILITER(S): at 09:29

## 2020-07-03 RX ADMIN — NYSTATIN CREAM 1 APPLICATION(S): 100000 CREAM TOPICAL at 05:08

## 2020-07-03 RX ADMIN — Medication 50 GRAM(S): at 16:02

## 2020-07-03 RX ADMIN — INSULIN HUMAN 5 UNIT(S)/HR: 100 INJECTION, SOLUTION SUBCUTANEOUS at 14:03

## 2020-07-03 RX ADMIN — MEROPENEM 100 MILLIGRAM(S): 1 INJECTION INTRAVENOUS at 21:05

## 2020-07-03 RX ADMIN — Medication 100000 UNIT(S): at 00:40

## 2020-07-03 NOTE — PROGRESS NOTE ADULT - ATTENDING COMMENTS
Dr. Mace (Attending Physician)  N - Turn off fentanyl Dr. Mace (Attending Physician)  N - Turn off fentanyl, precedex, hepatic encephalopathy  P - acute resp insufficiency postoperatively, minimal vent settings  C - PA pressures 23/7, CI 3.7, CVP 3, will give albumin bolus and recheck after, htn off nicardipine infusion  GI -  POD1 s/p liver tx for JEAN cirrhosis, improving bilirubin, ho GAVE, mod varices dc octreotide gtt   - d5 1/2 NS at 125 ml/hr  H - intraop received 14 rbc/14 ffp /10 plts/11 cryo, 500 cell saver, mycophenolate and tacrolimus 4 mg this am, check level tomorrow  ID - periop tx abx getting daptomycin, meropenem, ppx abx with bactrim, valganciclovir, nystatin  E - steroid taper, insulin gtt at 8 units per hour

## 2020-07-03 NOTE — PHYSICAL THERAPY INITIAL EVALUATION ADULT - PRECAUTIONS/LIMITATIONS, REHAB EVAL
CONTNIUED- CT PELVIS: Evaluation without intravenous contrast. No prostate abscess noted within the limitations of the exam.; US ABDOMEN: Limited examination. Cirrhosis. Borderline enlarged spleen. Trace ascites. Bilateral nonobstructing intrarenal calculi.; XRAY R FOOT: No radiographic evidence for osteomyelitis of the right foot.; CT HEAD: Atrophy and small vessel white matter ischemic changes. No change since 3/10/2020./fall precautions
CONTNIUED- CT PELVIS: Evaluation without intravenous contrast. No prostate abscess noted within the limitations of the exam.; US ABDOMEN: Limited examination. Cirrhosis. Borderline enlarged spleen. Trace ascites. Bilateral nonobstructing intrarenal calculi.; XRAY R FOOT: No radiographic evidence for osteomyelitis of the right foot.; CT HEAD: Atrophy and small vessel white matter ischemic changes. No change since 3/10/2020./fall precautions

## 2020-07-03 NOTE — PHYSICAL THERAPY INITIAL EVALUATION ADULT - BED MOBILITY TRAINING, PT EVAL
GOALS: Pt will perform bed mobility with Krystina in 4wks
GOAL: Pt will perform all bedmobility independently within 3-4 wks.

## 2020-07-03 NOTE — PROGRESS NOTE ADULT - SUBJECTIVE AND OBJECTIVE BOX
JESSICA MARTIN is a 64y Male s/p liver transplant on 7/2/20. PMH is signifcant for Hepatic encephalopathy, JEAN    Allergies: Codeine  CMV +/-    Transplant Medications  Induction  -Basiliximab 20 mg POD 0 (given in OR) and POD 4  -Methyprednisolone taper (switch to PO prednisone on POD 4)            POD 0: 500 mg IV in OR            POD 1: 125 mg IV Q12H            POD 2: 60 mg IV Q12H            POD 3: 30 mg IV Q12H        Maintenance Immunosuppression  -Tacrolimus 0.05 mg/kg/dose Q12H at 6AM and 6PM (Adjust for goal trough: 8-10)  -Mycophenolate 1,000 mg PO Q12H  -Prednisone             POD 4: 20 mg PO Q12H            POD 5: 10 mg PO Q12H            POD 6: 5 mg PO Q12H            POD 7-: 5 mg daily     Anti-infection   -Bactrim SS tablet (frequency based on renal function)  -Valganciclovir (dose based on CMV serostatus and frequency based on renal function)  -Nystatin swish and swallow 5 mL four times daily    Surgical prophylaxis pre- and intra-operative dosing  - Daptomycin and meropenem - for previous VRE and MDR organisms    Prophylaxis  -HAT ppx: aspirin 81 mg daily  -GI ppx: famotidine 20 mg daily  -Bowel ppx: senna/colace  -DVT: sequential compression device  -Pain:            Mild: Acetaminophen 650 mg every 6 hours PRN           Moderate: Tramadol 25 mg every 4 hours PRN (adjust for renal function)           Severe: Tramadol 50 mg every 4 hours PRN (adjust for renal function)    Outpatient medication reconciliation reviewed and will be re-started appropriately.  Plan discussed with multidisciplinary team.

## 2020-07-03 NOTE — PHYSICAL THERAPY INITIAL EVALUATION ADULT - TRANSFER SAFETY CONCERNS NOTED: BED/CHAIR, REHAB EVAL
decreased safety awareness/losing balance/stand pivot
non-mechanical lift/losing balance/decreased step length/decreased sequencing ability/decreased weight-shifting ability

## 2020-07-03 NOTE — PROGRESS NOTE ADULT - ASSESSMENT
64M w/ R posterior heel wound, stable  -Pt seen and evaluated  -R posterior heel wound to subQ stable w/ no signs of infection  -XR negative for gas or OM  -No podiatric surgical intervention  -Recommend local wound care w/ santyl and DSD  -Rx Santyl ointment   -Offload feet at all times while in bed (Z flow boots or pillows underneath legs)  -[facility] up w/ Dr. Wang at [facility] ([address], [address]) within 1 week of discharge. Call [phone] for appointment.

## 2020-07-03 NOTE — PHYSICAL THERAPY INITIAL EVALUATION ADULT - PLANNED THERAPY INTERVENTIONS, PT EVAL
bed mobility training/gait training/transfer training
balance training/gait training/bed mobility training/transfer training/GOAL: Pt will perform 3 stairs with or without U HR as needed within 4weeks.

## 2020-07-03 NOTE — PROGRESS NOTE ADULT - SUBJECTIVE AND OBJECTIVE BOX
Transplant Surgery - POC  --------------------------------------------------------------   Donor OLTx      Date:  2020       POD#1    64M with IDDM, HLD, obesity, HFpEF with mild LV diastolic dysfunction, MGUS, chronic anemia with a history of duodenal ulcer as well as GAVE and duodenal AVM s/p APC (last on 10/11/19), 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  Multiple recent hospitalizations due to UTIs, emphysematous cystitis (2020) and prostate abscess (3/2020).     Re-admitted on :   ·	worsening HE, listed at MELD 32  ·	UTI/VRE: tx with linezolid 6/15-, now on bactrim DS  -    ·	MISA/Hyponatremia  ·	UGI bleed (melena) s/p EGD () c/w hemorrhagic duodenitis/gastritis; Grade 2 EV; requiring multiple transfusions  ·	Known h/o R foot ulcer (MRI neg for OM)  ·	s/p  donor OLTx on 2020    OR details:  ·	L groin cutdown for vv bypass   ·	anastomosis: bicaval end-end/CBD duct-duct/HA & PV end-end, all standard anatomy.    ·	IOC with good flow  ·	Simulect induction. 500mg Solumedrol  ·	JPs x3 with T-Tube  ·	14U pRBC, 14U FFP, 10 PLT, 11 CRYO, 500mL returned from cell saver, EBL 5L, UOP 1100mL    Interval events:  POD#0  -intubated/sedated, discontinued fentanyl and Precedex this AM prior to rounds. PEEP 5, FiO2 40% . Last AB.42/45/137/28  -SBP in 160s overnight, was started on cardene drip, now weanedd off  -hypothermic overnight to 94F, improved w/ warmer.  -CVPs 3-5. On D5 1/2 NS at 125cc/hr  -On Insulin drip at 8U/hr.  -Given hx of GAVE, was kept on octreotide drip overnight, weaned off this morning.   -On dapto/mari given hx of VRE UTI, to end tomorrow.   -3 JPs in place w/ SS output. ANDREINA#1: 230cc  ANDREINA#2 105cc  ANDREINA#3 80cc   -UOP 2.8 L via bob.  -No output from T tube; Tbili downtrending.  -INR 1.31<1.32.<1.31.   -Tbili 3<3.9; Alk phos 55; <753 <496  -Lactate normalized to 1.1  -Overnight US w/ good flow    ===========================  Recipient Info:  ABO: A  CMV:  Neg  EBV Positive    Donor:  [facility]  Donor ID:  [id number] Match: [id number]  OPO: NYRT  Age: 29  ABO:  A1  High Risk:   No  COD: Cardiovascular  X Clamp time TBD  Medical Hx: Drug Abuse Positive cocaine, Negative: Barbituates, Methadone, Opiates, Phencyclidine, THC.    Covid Testing: Negative 2020 via bronchoalveolar lavage  Anti CMV positive  EBV IgG- positive  HepBcAb-neg  Hepatitis C-DANA- neg  Hepatitis C ab-neg  ============================      MEDICATIONS  (STANDING):  chlorhexidine 0.12% Liquid 15 milliLiter(s) Oral Mucosa every 12 hours  chlorhexidine 2% Cloths 1 Application(s) Topical <User Schedule>  chlorhexidine 4% Liquid 1 Application(s) Topical <User Schedule>  collagenase Ointment 1 Application(s) Topical daily  DAPTOmycin IVPB 400 milliGRAM(s) IV Intermittent every 24 hours  dexMEDEtomidine Infusion 0.2 MICROgram(s)/kG/Hr (5.15 mL/Hr) IV Continuous <Continuous>  dextrose 5% + sodium chloride 0.45%. 1000 milliLiter(s) (125 mL/Hr) IV Continuous <Continuous>  insulin regular Infusion 5 Unit(s)/Hr (5 mL/Hr) IV Continuous <Continuous>  meropenem  IVPB 1000 milliGRAM(s) IV Intermittent every 8 hours  methylPREDNISolone sodium succinate Injectable 125 milliGRAM(s) IV Push two times a day  mycophenolate mofetil Suspension 1000 milliGRAM(s) Oral <User Schedule>  nystatin    Suspension 069688 Unit(s) Swish and Swallow four times a day  nystatin Cream 1 Application(s) Topical two times a day  pantoprazole  Injectable 40 milliGRAM(s) IV Push <User Schedule>  tacrolimus    0.5 mG/mL Suspension 4 milliGRAM(s) Oral <User Schedule>  trimethoprim  40 mG/sulfamethoxazole 200 mG Suspension 80 milliGRAM(s) Oral daily  valGANciclovir 50 mG/mL Oral Solution 450 milliGRAM(s) Oral daily    MEDICATIONS  (PRN):  labetalol Injectable 10 milliGRAM(s) IV Push every 1 hour PRN systolic blood pressure GREATER THAN 170 mmHg or diastolic blood pressure GREATER THAN 100 mmHg  sodium chloride 0.9% lock flush 10 milliLiter(s) IV Push every 1 hour PRN Pre/post blood products, medications, blood draw, and to maintain line patency      PAST MEDICAL & SURGICAL HISTORY:  GIB (gastrointestinal bleeding)  GERD with esophagitis: Gastritis &amp; Non Bleeding Ulcers  Hepatic encephalopathy  Obesity  Fatty liver disease, nonalcoholic  Renal stones: 25 years ago  Hypertension  Neuropathy  Hypercholesteremia  Diabetes  S/P cholecystectomy      Vital Signs Last 24 Hrs  T(C): 36.8 (2020 11:00), Max: 37.5 (2020 04:00)  T(F): 98.2 (2020 11:00), Max: 99.5 (2020 04:00)  HR: 68 (2020 10:45) (54 - 68)  BP: 114/58 (2020 07:30) (109/54 - 164/77)  BP(mean): 83 (2020 07:30) (78 - 110)  RR: 24 (2020 10:45) (0 - 24)  SpO2: 100% (2020 10:45) (98% - 100%)    I&O's Summary    2020 07:01  -  2020 07:00  --------------------------------------------------------  IN: 2279.6 mL / OUT: 3300 mL / NET: -1020.4 mL    2020 07:01  -  2020 11:01  --------------------------------------------------------  IN: 918 mL / OUT: 265 mL / NET: 653 mL                              10.3   8.12  )-----------( 137      ( 2020 08:07 )             28.7     07-03    139  |  103  |  33<H>  ----------------------------<  141<H>  4.0   |  27  |  1.16    Ca    10.6<H>      2020 08:07  Phos  3.8     07-03  Mg     1.9     07-03    TPro  4.9<L>  /  Alb  3.0<L>  /  TBili  3.0<H>  /  DBili  1.1<H>  /  AST  546<H>  /  ALT  456<H>  /  AlkPhos  55  07-03      Review of systems  intubated/sedated     PHYSICAL EXAM:  Constitutional: Well developed / well nourished  Eyes: mild icterus  ENMT: NC/AT, ET tube in place  Neck: supple, L swan c/d/i  Respiratory: CTA b/l  Cardiovascular: RRR  Gastrointestinal: Soft abdomen, ND, no incisional pain through sedation. chevron incision, staples in place. No signs of infection. T-tube w/ no drainage.  JPs x3--all ss. Minimal SS leakage around the ANDREINA sites.   Genitourinary: Urinary catheter in place with good UO  Extremities: SCD's in place and working bilaterally. L groin cutdown site c/d/i. R a-line c/d/i.  Vascular: Palpable dp pulses bilaterally.   Neurological: intubated, sedated  Skin: no rashes, ulcerations, lesions post-op  Musculoskeletal: sedated  Psychiatric: sedated Transplant Surgery - POC  --------------------------------------------------------------   Donor OLTx      Date:  2020       POD#1    Patient seen with multidisciplinary team including Transplant Surgeon: Dr. Gray, Dr. Lucio.  Transplant Hepatologist Dr. Cano, hepatology fellow. Transplant Nephrologist: Dr. FAY Patel. renal fellow  Pharmacist: Ancelmo Blanca. OLY Burciaga, ICU attending Dr. Ruffin and ICU team resident and RN during am rounds and examined with Dr. Lucio. Disciplines not in attendance will be notified of the plan.     64M with IDDM, HLD, obesity, HFpEF with mild LV diastolic dysfunction, MGUS, chronic anemia with a history of duodenal ulcer as well as GAVE and duodenal AVM s/p APC (last on 10/11/19), 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  Multiple recent hospitalizations due to UTIs, emphysematous cystitis (2020) and prostate abscess (3/2020).     Re-admitted on :   ·	worsening HE, listed at MELD 32  ·	UTI/VRE: tx with linezolid 6/15-, now on bactrim DS  -    ·	MISA/Hyponatremia  ·	UGI bleed (melena) s/p EGD () c/w hemorrhagic duodenitis/gastritis; Grade 2 EV; requiring multiple transfusions  ·	Known h/o R foot ulcer (MRI neg for OM)  ·	s/p  donor OLTx on 2020    OR details:  ·	L groin cutdown for vv bypass   ·	anastomosis: bicaval end-end/CBD duct-duct/HA & PV end-end, all standard anatomy.    ·	IOC with good flow  ·	Simulect induction. 500mg Solumedrol  ·	JPs x3 with T-Tube  ·	14U pRBC, 14U FFP, 10 PLT, 11 CRYO, 500mL returned from cell saver, EBL 5L, UOP 1100mL    Interval events:  POD#0  -intubated/sedated, discontinued fentanyl and Precedex this AM prior to rounds. PEEP 5, FiO2 40% . Last AB.42/45/137/28  -SBP in 160s overnight, was started on cardene drip, now weanedd off  -hypothermic overnight to 94F, improved w/ warmer.  -CVPs 3-5. On D5 1/2 NS at 125cc/hr  -On Insulin drip at 8U/hr.  -Given hx of GAVE, was kept on octreotide drip overnight, weaned off this morning.   -On dapto/mari given hx of VRE UTI, to end tomorrow.   -3 JPs in place w/ SS output. ANDREINA#1: 230cc  ANDREINA#2 105cc  ANDREINA#3 80cc   -UOP 2.8 L via bob.  -No output from T tube; Tbili downtrending.  -INR 1.31<1.32.<1.31.   -Tbili 3<3.9; Alk phos 55; <753 <496  -Lactate normalized to 1.1  -Overnight US w/ good flow    ===========================  Recipient Info:  ABO: A  CMV:  Neg  EBV Positive    Donor:  [facility]  Donor ID:  [id number] Match: [id number]  OPO: NYRT  Age: 29  ABO:  A1  High Risk:   No  COD: Cardiovascular  X Clamp time TBD  Medical Hx: Drug Abuse Positive cocaine, Negative: Barbituates, Methadone, Opiates, Phencyclidine, THC.    Covid Testing: Negative 2020 via bronchoalveolar lavage  Anti CMV positive  EBV IgG- positive  HepBcAb-neg  Hepatitis C-DANA- neg  Hepatitis C ab-neg  ============================      MEDICATIONS  (STANDING):  chlorhexidine 0.12% Liquid 15 milliLiter(s) Oral Mucosa every 12 hours  chlorhexidine 2% Cloths 1 Application(s) Topical <User Schedule>  chlorhexidine 4% Liquid 1 Application(s) Topical <User Schedule>  collagenase Ointment 1 Application(s) Topical daily  DAPTOmycin IVPB 400 milliGRAM(s) IV Intermittent every 24 hours  dexMEDEtomidine Infusion 0.2 MICROgram(s)/kG/Hr (5.15 mL/Hr) IV Continuous <Continuous>  dextrose 5% + sodium chloride 0.45%. 1000 milliLiter(s) (125 mL/Hr) IV Continuous <Continuous>  insulin regular Infusion 5 Unit(s)/Hr (5 mL/Hr) IV Continuous <Continuous>  meropenem  IVPB 1000 milliGRAM(s) IV Intermittent every 8 hours  methylPREDNISolone sodium succinate Injectable 125 milliGRAM(s) IV Push two times a day  mycophenolate mofetil Suspension 1000 milliGRAM(s) Oral <User Schedule>  nystatin    Suspension [id number] Unit(s) Swish and Swallow four times a day  nystatin Cream 1 Application(s) Topical two times a day  pantoprazole  Injectable 40 milliGRAM(s) IV Push <User Schedule>  tacrolimus    0.5 mG/mL Suspension 4 milliGRAM(s) Oral <User Schedule>  trimethoprim  40 mG/sulfamethoxazole 200 mG Suspension 80 milliGRAM(s) Oral daily  valGANciclovir 50 mG/mL Oral Solution 450 milliGRAM(s) Oral daily    MEDICATIONS  (PRN):  labetalol Injectable 10 milliGRAM(s) IV Push every 1 hour PRN systolic blood pressure GREATER THAN 170 mmHg or diastolic blood pressure GREATER THAN 100 mmHg  sodium chloride 0.9% lock flush 10 milliLiter(s) IV Push every 1 hour PRN Pre/post blood products, medications, blood draw, and to maintain line patency      PAST MEDICAL & SURGICAL HISTORY:  GIB (gastrointestinal bleeding)  GERD with esophagitis: Gastritis &amp; Non Bleeding Ulcers  Hepatic encephalopathy  Obesity  Fatty liver disease, nonalcoholic  Renal stones: 25 years ago  Hypertension  Neuropathy  Hypercholesteremia  Diabetes  S/P cholecystectomy      Vital Signs Last 24 Hrs  T(C): 36.8 (2020 11:00), Max: 37.5 (2020 04:00)  T(F): 98.2 (2020 11:00), Max: 99.5 (2020 04:00)  HR: 68 (2020 10:45) (54 - 68)  BP: 114/58 (2020 07:30) (109/54 - 164/77)  BP(mean): 83 (2020 07:30) (78 - 110)  RR: 24 (2020 10:45) (0 - 24)  SpO2: 100% (2020 10:45) (98% - 100%)    I&O's Summary    2020 07:01  -  2020 07:00  --------------------------------------------------------  IN: 2279.6 mL / OUT: 3300 mL / NET: -1020.4 mL    2020 07:01  -  2020 11:01  --------------------------------------------------------  IN: 918 mL / OUT: 265 mL / NET: 653 mL                              10.3   8.12  )-----------( 137      ( 2020 08:07 )             28.7     07-03    139  |  103  |  33<H>  ----------------------------<  141<H>  4.0   |  27  |  1.16    Ca    10.6<H>      2020 08:07  Phos  3.8     07-03  Mg     1.9     07-03    TPro  4.9<L>  /  Alb  3.0<L>  /  TBili  3.0<H>  /  DBili  1.1<H>  /  AST  546<H>  /  ALT  456<H>  /  AlkPhos  55  07-03      Review of systems  intubated/sedated     PHYSICAL EXAM:  Constitutional: Well developed / well nourished  Eyes: mild icterus  ENMT: NC/AT, ET tube in place  Neck: supple, L swan c/d/i  Respiratory: CTA b/l  Cardiovascular: RRR  Gastrointestinal: Soft abdomen, ND, no incisional pain through sedation. chevron incision, staples in place. No signs of infection. T-tube w/ no drainage.  JPs x3--all ss. Minimal SS leakage around the ANDREINA sites.   Genitourinary: Urinary catheter in place with good UO  Extremities: SCD's in place and working bilaterally. L groin cutdown site c/d/i. R a-line c/d/i.  Vascular: Palpable dp pulses bilaterally.   Neurological: intubated, sedated  Skin: no rashes, ulcerations, lesions post-op  Musculoskeletal: sedated  Psychiatric: sedated

## 2020-07-03 NOTE — PHYSICAL THERAPY INITIAL EVALUATION ADULT - PHYSICAL ASSIST/NONPHYSICAL ASSIST: STAND/SIT, REHAB EVAL
1 person assist/verbal cues/assist in front blocking knees/nonverbal cues (demo/gestures)
nonverbal cues (demo/gestures)/verbal cues/2 person assist

## 2020-07-03 NOTE — PHYSICAL THERAPY INITIAL EVALUATION ADULT - PHYSICAL ASSIST/NONPHYSICAL ASSIST: SIT/STAND, REHAB EVAL
nonverbal cues (demo/gestures)/assist in front blocking knees/verbal cues/1 person assist
verbal cues/nonverbal cues (demo/gestures)/2 person assist

## 2020-07-03 NOTE — PHYSICAL THERAPY INITIAL EVALUATION ADULT - PHYSICAL ASSIST/NONPHYSICAL ASSIST: BED TO CHAIR, REHAB EVAL
verbal cues/nonverbal cues (demo/gestures)/1 person assist
verbal cues/nonverbal cues (demo/gestures)/2 person assist

## 2020-07-03 NOTE — PROVIDER CONTACT NOTE (OTHER) - ASSESSMENT
As per day RN, no drainage from t-tube, and increased swelling on right side of abdomen near drain. SICU team aware, and transplant team aware. As per day RN, no drainage from t-tube, and increased swelling on right side of abdomen near t-tube drain. SICU team aware, and transplant team aware.

## 2020-07-03 NOTE — PROGRESS NOTE ADULT - ATTENDING COMMENTS
Post op liver transplant returning to ICU intubated, off pressors, hemodynamically stable. immediate post op liver doppler study reassuring. good urine output, drains with moderate output.     plan for sedation with precedex as well as fentanyl as needed intermittently  vent weaned to minimal upon arrival 40% 5 500 14  noted to be midly bradycardic, plan per transplant is sbp less than 145. will do hydralazine labetalol and if needed nicardipine drip  clearing lactate  NPO for tonight  continue with steroids, tacro tomorrow  AC off for now, likely start in AM  cont with antibiotics pre op  insulin and octreotide infusions started intra op will cont.

## 2020-07-03 NOTE — PROGRESS NOTE ADULT - SUBJECTIVE AND OBJECTIVE BOX
Chief Complaint:  Patient is a 64y old  Male who presents with a chief complaint of Liver failure, hepatic encephalopathy (2020 09:19)    Reason for consult: cirrhosis, s/p liver transplant    Interval Events: S/p  donor liver transplant. Pt doing well post-op.     Hospital Medications:  chlorhexidine 0.12% Liquid 15 milliLiter(s) Oral Mucosa every 12 hours  chlorhexidine 2% Cloths 1 Application(s) Topical <User Schedule>  chlorhexidine 4% Liquid 1 Application(s) Topical <User Schedule>  collagenase Ointment 1 Application(s) Topical daily  DAPTOmycin IVPB 400 milliGRAM(s) IV Intermittent every 24 hours  dexMEDEtomidine Infusion 0.2 MICROgram(s)/kG/Hr IV Continuous <Continuous>  dextrose 5% + sodium chloride 0.45%. 1000 milliLiter(s) IV Continuous <Continuous>  insulin regular Infusion 5 Unit(s)/Hr IV Continuous <Continuous>  labetalol Injectable 10 milliGRAM(s) IV Push every 1 hour PRN  meropenem  IVPB 1000 milliGRAM(s) IV Intermittent every 8 hours  methylPREDNISolone sodium succinate Injectable 125 milliGRAM(s) IV Push two times a day  mycophenolate mofetil Suspension 1000 milliGRAM(s) Oral <User Schedule>  nystatin    Suspension [id number] Unit(s) Swish and Swallow four times a day  nystatin Cream 1 Application(s) Topical two times a day  pantoprazole  Injectable 40 milliGRAM(s) IV Push <User Schedule>  sodium chloride 0.9% lock flush 10 milliLiter(s) IV Push every 1 hour PRN  tacrolimus    0.5 mG/mL Suspension 4 milliGRAM(s) Oral <User Schedule>  trimethoprim  40 mG/sulfamethoxazole 200 mG Suspension 80 milliGRAM(s) Oral daily  valGANciclovir 50 mG/mL Oral Solution 450 milliGRAM(s) Oral daily      ROS:   General:  No  fevers, chills, night sweats, fatigue  Eyes:  Good vision, no reported pain  ENT:  No sore throat, pain, runny nose  CV:  No pain, palpitations  Pulm:  No dyspnea, cough  GI:  See HPI, otherwise negative  :  No  incontinence, nocturia  Muscle:  No pain, weakness  Neuro:  No memory problems  Psych:  No insomnia, mood problems, depression  Endocrine:  No polyuria, polydipsia, cold/heat intolerance  Heme:  No petechiae, ecchymosis, easy bruisability  Skin:  No rash    PHYSICAL EXAM:   Vital Signs:  Vital Signs Last 24 Hrs  T(C): 37 (2020 09:00), Max: 37.5 (2020 04:00)  T(F): 98.6 (2020 09:00), Max: 99.5 (2020 04:00)  HR: 60 (2020 10:30) (54 - 67)  BP: 114/58 (2020 07:30) (109/54 - 164/77)  BP(mean): 83 (2020 07:30) (78 - 110)  RR: 18 (2020 10:30) (0 - 23)  SpO2: 100% (2020 10:30) (98% - 100%)  Daily     Daily     GENERAL: no acute distress, intubated  NEURO: sedated  HEENT: anicteric sclera, no conjunctival pallor appreciated  CHEST: no respiratory distress, no accessory muscle use  CARDIAC: regular rate, rhythm  ABDOMEN: soft, incisions w/ minimal drainage, tubes w/ serosanguinous drainage  EXTREMITIES: warm, well perfused, no edema  SKIN: no lesions noted    LABS: reviewed                        10.3   8.12  )-----------( 137      ( 2020 08:07 )             28.7     07-03    139  |  103  |  33<H>  ----------------------------<  141<H>  4.0   |  27  |  1.16    Ca    10.6<H>      2020 08:07  Phos  3.8     07-03  Mg     1.9     07-03    TPro  4.9<L>  /  Alb  3.0<L>  /  TBili  3.0<H>  /  DBili  1.1<H>  /  AST  546<H>  /  ALT  456<H>  /  AlkPhos  55  07-03    LIVER FUNCTIONS - ( 2020 09:00 )  Alb: x     / Pro: x     / ALK PHOS: x     / ALT: x     / AST: x     / GGT: 52 U/L         Interval Diagnostic Studies: see sunrise for full report

## 2020-07-03 NOTE — PHYSICAL THERAPY INITIAL EVALUATION ADULT - PHYSICAL ASSIST/NONPHYSICAL ASSIST: SUPINE/SIT, REHAB EVAL
nonverbal cues (demo/gestures)/verbal cues/1 person assist
nonverbal cues (demo/gestures)/verbal cues/2 person assist

## 2020-07-03 NOTE — PROGRESS NOTE ADULT - SUBJECTIVE AND OBJECTIVE BOX
HISTORY  64y Male PMHx of decompensated JEAN cirrhosis c/b small esophageal varices (12/2019), portal hypertensive gastropathy, ascites, hx SBP, hx hepatic encephalopathy, GAVE syndrome s/p APC, hx duodenal ulcer (5/2019), HTN, HLD, DM, HFpEF (EF 70%), recent ESBL E coli prostate abscess (3/2020 s/p 4 weeks ertapenem), orthostatic hypotension ( on midodrine) who was initially admitted to medicine for hepatic encephalopathy. Hospital course c/b MISA, VRE UTI, acute blood loss anemia, received blood transfusion, EGD done 6/22 with Grade II esophageal varices, acute gastritis with hemorrhage and acute duodenitis with hemorrhage. Patient had worsening mental status, concerning decompensating liver cirrhosis, hepatic encephalopathy patient then transferred to SICU for further care.        24 HOUR EVENTS:  - OR thursday (7/2/20) morning for liver transplant  - S/p liver transplant, patient remined intubated, off pressors  - Cardene gtt started in the evneing for SBP goal < 145     SUBJECTIVE/ROS:  [ ] A ten-point review of systems was otherwise negative except as noted.  [ ] Due to altered mental status/intubation, subjective information were not able to be obtained from the patient. History was obtained, to the extent possible, from review of the chart and collateral sources of information.      NEURO  Exam: awake, alert, oriented  Meds: dexMEDEtomidine Infusion 0.2 MICROgram(s)/kG/Hr IV Continuous <Continuous>  fentaNYL   Infusion... 1 MICROgram(s)/kG/Hr IV Continuous <Continuous>    [x] Adequacy of sedation and pain control has been assessed and adjusted      RESPIRATORY  RR: 19 (07-03-20 @ 00:30) (0 - 23)  SpO2: 100% (07-03-20 @ 00:30) (98% - 100%)  Exam: unlabored, clear to auscultation bilaterally  Mechanical Ventilation: Mode: AC/ CMV (Assist Control/ Continuous Mandatory Ventilation), RR (machine): 14, RR (patient): 19, TV (machine): 500, FiO2: 40, PEEP: 5, ITime: 0.9, MAP: 10, PIP: 22  ABG - ( 03 Jul 2020 00:04 )  pH: 7.43  /  pCO2: 42    /  pO2: 101   / HCO3: 28    / Base Excess: 3.3   /  SaO2: 98      Lactate: x      [N/A] Extubation Readiness Assessed  Meds: none    CARDIOVASCULAR  HR: 57 (07-03-20 @ 00:30) (54 - 73)  BP: 121/59 (07-02-20 @ 23:15) (121/59 - 164/77)  BP(mean): 82 (07-02-20 @ 23:15) (82 - 110)  ABP: 129/44 (07-03-20 @ 00:30) (106/41 - 170/68)  ABP(mean): 71 (07-03-20 @ 00:30) (56 - 131)  VBG - ( 02 Jul 2020 09:33 )  pH: 7.39  /  pCO2: 46    /  pO2: 70    / HCO3: 27    / Base Excess: 2.2   /  SaO2: 92     Lactate: 1.6    Lactate, Blood: 5.6 mmol/L (07-02 @ 16:47)  Exam: regular rate and rhythm  Cardiac Rhythm: sinus  Perfusion     [x]Adequate   [ ]Inadequate  Mentation   [x]Normal       [ ]Reduced  Extremities  [x]Warm         [ ]Cool  Volume Status [ ]Hypervolemic [x]Euvolemic [ ]Hypovolemic  Meds: labetalol Injectable 10 milliGRAM(s) IV Push every 1 hour PRN systolic blood pressure GREATER THAN 170 mmHg or diastolic blood pressure GREATER THAN 100 mmHg  niCARdipine Infusion 5 mG/Hr IV Continuous <Continuous>        GI/NUTRITION  Exam: soft, nontender, nondistended  Diet: NPO   Meds: pantoprazole  Injectable 40 milliGRAM(s) IV Push <User Schedule>      GENITOURINARY  I&O's Detail    07-01 @ 07:01  -  07-02 @ 07:00  --------------------------------------------------------  IN:    octreotide  Infusion: 240 mL    Oral Fluid: 100 mL    Packed Red Blood Cells: 850 mL    Platelets - Single Donor: 300 mL    Solution: 287.5 mL  Total IN: 1777.5 mL    OUT:    Voided: 2200 mL  Total OUT: 2200 mL    Total NET: -422.5 mL      07-02 @ 07:01  -  07-03 @ 00:39  --------------------------------------------------------  IN:    dexmedetomidine Infusion: 118.7 mL    dextrose 5% + sodium chloride 0.45%.: 500 mL    fentaNYL   Infusion: 26 mL    insulin regular Infusion: 20 mL    niCARdipine Infusion: 25 mL    octreotide  Infusion: 16 mL    octreotide  Infusion: 30 mL    Solution: 50 mL    Solution: 100 mL  Total IN: 885.7 mL    OUT:    Drain: 65 mL    Drain: 50 mL    Drain: 170 mL    Indwelling Catheter - Urethral: 2200 mL  Total OUT: 2485 mL    Total NET: -1599.3 mL          07-02    137  |  100  |  38<H>  ----------------------------<  192<H>  3.9   |  24  |  1.15    Ca    11.9<H>      02 Jul 2020 20:14  Phos  4.0     07-02  Mg     2.0     07-02    TPro  5.4<L>  /  Alb  3.3  /  TBili  8.5<H>  /  DBili  4.8<H>  /  AST  1041<H>  /  ALT  601<H>  /  AlkPhos  60  07-02    [x] [place] catheter, indication: urine output monitoring in critically ill   Meds: dextrose 5% + sodium chloride 0.45%. 1000 milliLiter(s) IV Continuous <Continuous>  sodium chloride 0.9% lock flush 10 milliLiter(s) IV Push every 1 hour PRN Pre/post blood products, medications, blood draw, and to maintain line patency        HEMATOLOGIC  Meds: none  [x] VTE Prophylaxis                        10.8   10.44 )-----------( 145      ( 03 Jul 2020 00:10 )             29.4     PT/INR - ( 03 Jul 2020 00:10 )   PT: 14.4 sec;   INR: 1.27 ratio         PTT - ( 03 Jul 2020 00:10 )  PTT:28.2 sec  Transfusion     [ ] PRBC   [ ] Platelets   [ ] FFP   [ ] Cryoprecipitate      INFECTIOUS DISEASES  WBC Count: 10.44 K/uL (07-03 @ 00:10)  WBC Count: 12.40 K/uL (07-02 @ 20:14)  WBC Count: 8.71 K/uL (07-02 @ 16:47)    RECENT CULTURES:    Meds: DAPTOmycin IVPB 400 milliGRAM(s) IV Intermittent every 24 hours  meropenem  IVPB 1000 milliGRAM(s) IV Intermittent every 8 hours  mycophenolate mofetil Suspension 1000 milliGRAM(s) Oral <User Schedule>  nystatin    Suspension 674836 Unit(s) Swish and Swallow four times a day  tacrolimus    0.5 mG/mL Suspension 4 milliGRAM(s) Oral <User Schedule>  trimethoprim  40 mG/sulfamethoxazole 200 mG Suspension 80 milliGRAM(s) Oral daily  valGANciclovir 50 mG/mL Oral Solution 450 milliGRAM(s) Oral daily        ENDOCRINE  CAPILLARY BLOOD GLUCOSE      POCT Blood Glucose.: 195 mg/dL (02 Jul 2020 23:59)  POCT Blood Glucose.: 194 mg/dL (02 Jul 2020 23:01)  POCT Blood Glucose.: 195 mg/dL (02 Jul 2020 21:59)  POCT Blood Glucose.: 152 mg/dL (02 Jul 2020 21:01)  POCT Blood Glucose.: 191 mg/dL (02 Jul 2020 20:02)  POCT Blood Glucose.: 220 mg/dL (02 Jul 2020 05:46)    Meds: insulin regular Infusion 5 Unit(s)/Hr IV Continuous <Continuous>  methylPREDNISolone sodium succinate Injectable 125 milliGRAM(s) IV Push two times a day  octreotide  Infusion 40 MICROgram(s)/Hr IV Continuous <Continuous>        ACCESS DEVICES:  [x] Peripheral IV  [ ] Central Venous Line	[ ] R	[ ] L	[ ] IJ	[ ] Fem	[ ] SC	Placed:   [ ] Arterial Line		[ ] R	[ ] L	[ ] Fem	[ ] Rad	[ ] Ax	Placed:   [ ] PICC:					[ ] Mediport  [ ] Urinary Catheter, Date Placed:   [x] Necessity of urinary, arterial, and venous catheters discussed    OTHER MEDICATIONS:  chlorhexidine 0.12% Liquid 15 milliLiter(s) Oral Mucosa every 12 hours  chlorhexidine 2% Cloths 1 Application(s) Topical <User Schedule>  chlorhexidine 4% Liquid 1 Application(s) Topical <User Schedule>  nystatin Cream 1 Application(s) Topical two times a day      CODE STATUS: full code       IMAGING: < from: CT Pelvis No Cont (06.12.20 @ 17:37) >  PROCEDURE:   CT of the Pelvis was performed without intravenous contrast.   Intravenous contrast: None.  Oral contrast: None.  Sagittal and coronal reformats were performed.    FINDINGS:  BLADDER: Within normal limits.  REPRODUCTIVE ORGANS: The prostate is normal in size with a few foci of calcifications. No discrete fluid collection is seen.  LYMPH NODES: No pelvic lymphadenopathy.    VISUALIZED PORTIONS:  ABDOMINAL ORGANS: 0.6 cm no obstructive calculus in the lower pole of the left kidney is unchanged.  BOWEL: Moderate to large amount of stool and rectum and colon. No small bowel obstruction.  PERITONEUM: No ascites.  VESSELS: Arterial calcifications.  ABDOMINAL WALL: Within normal limits.  BONES: Degenerative changes.    IMPRESSION:     Evaluation without intravenous contrast. No prostate abscess noted within the limitations of the exam.      < end of copied text >

## 2020-07-03 NOTE — PROGRESS NOTE ADULT - ASSESSMENT
63 y/o M PMHx decompensated JEAN Cirrhosis on transplant list, DMII, HFpEF, recent admission for ESBL E coli prostatic abscess 2/2 4 wks ertapenem, hx of ESBL UTIs, recent VRE urinary colonization came to hospital for worsening jaundice and episode of confusion, resolved PTA.   UCx 6/14 with VRE  complicated hospital course  Now s/p OLT for worsening status  In SICU-intubated  otherwise hemodynamically stable    A) OLT  continue periop dapto and merem  per protocol  Monitor for any s/s complications/nosocomial infection  continue bactrim,valcyte for prophylaxis  Other plan per transplant team    B)Pancytopenia, --s/p Filgrastim on 6/30  -Continue to follow CBC with diff  C)R Heel Eschar  --Monitor off of antibiotics     D)GIB - s/p EGD with Acute gastritis and duodenitis with hemorrhage  --Management per GI  --Continue to follow CBC with diff      Will tailor plan for ID issues  per course,results.Will defer to primary team on management of other issues.  Assessment, plan and recommendations as detailed above were discussed with the SICU   team,Dr Menard(transplant)    Infectious Diseases Service will cover over weekend.  Please call [phone] if issues  .

## 2020-07-03 NOTE — PHYSICAL THERAPY INITIAL EVALUATION ADULT - ADDITIONAL COMMENTS
as per CM note: pt lives with family in private home with 3 steps to enter. Pt able to transfer to/from w/c, and requires assist for ADLs. pt now with increased difficulty transfering. Pt owns w/c and walker. family wants home PT.
as per CM note: pt lives with family in private home with 3 steps to enter. Pt able to transfer to/from w/c, and requires assist for ADLs. pt now with increased difficulty transfering. Pt owns w/c and walker. family wants home PT.

## 2020-07-03 NOTE — PROGRESS NOTE ADULT - ASSESSMENT
63 y/o male w/ a PMHx of HTN, HLD, DM type II, HFpEF (EF 70%), and cirrhosis secondary to JEAN c/b small esophageal varices, portal hypertensive gastropathy, ascites, SBP, hepatic encephalopathy, GAVE syndrome s/p APC, duodenal ulcer (5/2019), recent ESBL E coli prostate abscess (3/2020 s/p 4 weeks of ertapenem), and orthostatic hypotension (on midodrine) presenting with decompensated cirrhosis w/ a hospital course c/b MISA, hyponatremia, VRE UTI, acute blood loss anemia from GAVE syndrome, and hepatic encephalopathy    PLAN:  Neurologic: hepatic encephalopathy  -monitor mental status  -rifaxamin and lactulose for hepatic encephalopathy  - Sedation with precedex and fentanyl     Respiratory: Intubated   - Continue mechanical ventilation with PRVC: 500/14/5/40%  - SBT in the AM     Cardiovascular: Hypertension post-op  - Cardeene gtt; wean as tolerated goal SBP < 145    Gastrointestinal/Nutrition: GIB, JEAN cirrhosis, GAVE syndrome s/p liver transplant   -NPO /NGT to LWS   -Octreotide infusion at 8ml/hr     Renal: urinary retention, hyponatremia  - Monitor I&Os  - Monitor electrolytes and replete as necessary  - Flomax for BPH    Heme: coagulopathy  - Trend H/H  - Epogen for anemia as per nephrology  - Venodynes for mechanical VTE prophylaxis, holding chemical VTE prophylaxis in the setting of coagulopathy/GIB    Infectious Disease: chronic UTI, H pylori  -c/w Bactrim and valcyte for PPx   -Meropenem x1 dose and Daptomycin x 14d to be given in OR as surgical ppx  -nystatin for thrush ppx    Endo: DM type II, HLD  -low-dose ISS q6hr, FS q6hr  -500mg solumedrol ordered, to be given in OR    Disposition:  Full code

## 2020-07-03 NOTE — PROGRESS NOTE ADULT - ATTENDING COMMENTS
agree with above  lfts trending down  immunosuppression tacro, mmf and steroids  continue antibiotics

## 2020-07-03 NOTE — PHYSICAL THERAPY INITIAL EVALUATION ADULT - IMPAIRMENTS CONTRIBUTING IMPAIRED BED MOBILITY, REHAB EVAL
impaired postural control/decreased ROM/impaired balance/cognition/decreased endurance/decreased strength
impaired coordination/impaired balance/decreased strength/impaired postural control

## 2020-07-03 NOTE — PROGRESS NOTE ADULT - ATTENDING COMMENTS
Patient now post transplant. Creat elevated to 2.04. hold furosemide and monitor creat.  Continue spironolactone at 50 mg/day and monitor electrolytes.  With patent portal vasculature would plan withdrawal of octreotide.  Carefully monitor glucose, particularly given use of steroids for immune suppression.  Plan removal of bob as soon and renal function stabilizes. Check U/A and urine culture.

## 2020-07-03 NOTE — PHYSICAL THERAPY INITIAL EVALUATION ADULT - BALANCE DISTURBANCE, IDENTIFIED IMPAIRMENT CONTRIBUTE, REHAB EVAL
impaired postural control/decreased endurance/decreased strength/decreased ROM
impaired coordination/decreased strength/impaired postural control

## 2020-07-03 NOTE — PHYSICAL THERAPY INITIAL EVALUATION ADULT - MANUAL MUSCLE TESTING RESULTS, REHAB EVAL
grossly assessed due to/Strength is grossly at least 3/5 throughout  BLE, 3+/5 BUE.
grossly assessed due to/functionally at least 3/5 t/o

## 2020-07-03 NOTE — PHYSICAL THERAPY INITIAL EVALUATION ADULT - GENERAL OBSERVATIONS, REHAB EVAL
Provider requesting pt OOB to chair for "sun exposure." Pt seen for 45min PT Giovana. unable to obtain vitals at this time. Pt s/p acute liver failure. Pt rec'd lethargic, able to tell his name, unable to tel state date from choices provided, and knows he's is in the hospital given 3 choices. JODY Grimm in room throughout [facility].
Pt received semisupine in bed, A&Ox3, following simple commands, willing to participate, s/p liver transplant(7/2), +JPx3, +supplemental 02 via NC, +bob, +ICU monitoring.

## 2020-07-03 NOTE — PROGRESS NOTE ADULT - SUBJECTIVE AND OBJECTIVE BOX
Patient is a 64y old  Male who presents with a chief complaint of Liver failure, hepatic encephalopathy (03 Jul 2020 09:19)    Being followed by ID for OLT    Interval history:s/p OLT  on vent  sedated   wife at bedside  No other acute events  No other acute events      ROS:  Not obtainable     Antimicrobials:    DAPTOmycin IVPB 400 milliGRAM(s) IV Intermittent every 24 hours  meropenem  IVPB 1000 milliGRAM(s) IV Intermittent every 8 hours  nystatin    Suspension [id number] Unit(s) Swish and Swallow four times a day  trimethoprim  40 mG/sulfamethoxazole 200 mG Suspension 80 milliGRAM(s) Oral daily  valGANciclovir 50 mG/mL Oral Solution 450 milliGRAM(s) Oral daily      other medications reviewed    Vital Signs Last 24 Hrs  T(C): 37 (07-03-20 @ 09:00), Max: 37.5 (07-03-20 @ 04:00)  T(F): 98.6 (07-03-20 @ 09:00), Max: 99.5 (07-03-20 @ 04:00)  HR: 60 (07-03-20 @ 10:30) (54 - 67)  BP: 114/58 (07-03-20 @ 07:30) (109/54 - 164/77)  BP(mean): 83 (07-03-20 @ 07:30) (78 - 110)  RR: 18 (07-03-20 @ 10:30) (0 - 23)  SpO2: 100% (07-03-20 @ 10:30) (98% - 100%)    Physical Exam:        HEENT  ETT    Neck supple no JVD or LN  LIJ swan no erythema or discharge     Chest Good AE,CTA    CVS RRR S1 S2 WNl No murmur or rub or gallop    Abd soft BS hypo  Incision ss with dressing no discvharge  JPs    Ext No cyanosis clubbing or edema    IV site no erythema tenderness or discharge    Joints no swelling or LOM    CNS sedated     Lab Data:                          10.3   8.12  )-----------( 137      ( 03 Jul 2020 08:07 )             28.7     WBC Count: 8.12 (07-03-20 @ 08:07)  WBC Count: 8.43 (07-03-20 @ 04:12)  WBC Count: 10.44 (07-03-20 @ 00:10)  WBC Count: 12.40 (07-02-20 @ 20:14)  WBC Count: 8.71 (07-02-20 @ 16:47)  WBC Count: 10.82 (07-02-20 @ 00:23)  WBC Count: 12.15 (07-01-20 @ 18:37)  WBC Count: 15.32 (07-01-20 @ 01:19)  WBC Count: 12.20 (06-30-20 @ 16:18)  WBC Count: 4.95 (06-30-20 @ 12:07)  WBC Count: 2.68 (06-30-20 @ 05:17)  WBC Count: 2.80 (06-30-20 @ 02:32)  WBC Count: 2.39 (06-29-20 @ 22:02)  WBC Count: 2.67 (06-29-20 @ 00:16)  WBC Count: 2.85 (06-28-20 @ 12:12)  WBC Count: 3.25 (06-28-20 @ 02:03)  WBC Count: 3.97 (06-27-20 @ 14:22)  WBC Count: 3.57 (06-27-20 @ 07:47)  WBC Count: 3.61 (06-27-20 @ 01:46)  WBC Count: 5.70 (06-26-20 @ 13:55)    07-03    139  |  103  |  33<H>  ----------------------------<  141<H>  4.0   |  27  |  1.16    Creatinine, Serum: 1.16 mg/dL (07-03-20 @ 08:07)  Creatinine, Serum: 1.15 mg/dL (07-03-20 @ 04:12)  Creatinine, Serum: 1.12 mg/dL (07-03-20 @ 00:10)  Creatinine, Serum: 1.15 mg/dL (07-02-20 @ 20:14)  Creatinine, Serum: 1.18 mg/dL (07-02-20 @ 16:47)      Ca    10.6<H>      03 Jul 2020 08:07  Phos  3.8     07-03  Mg     1.9     07-03    TPro  4.9<L>  /  Alb  3.0<L>  /  TBili  3.0<H>  /  DBili  1.1<H>  /  AST  546<H>  /  ALT  456<H>  /  AlkPhos  55  07-03        < from: Xray Chest 1 View- PORTABLE-Urgent (07.02.20 @ 20:29) >    Impression: Endotracheal tube in good position. [place]-Rafiq catheter in place with tip in the right main pulmonary artery. Nasogastric tube tip at the GEjunction; this should be slightly advanced. Mild pulmonary edema, unchanged.          < end of copied text >                  Imaging studies(films) independently reviewed.Findings as detailed in report above    Urine Microscopic-Add On (NC) (07.03.20 @ 09:00)    White Blood Cell - Urine: 5 /HPF    Hyaline Casts: 2 /lpf    Bacteria: Negative    Epithelial Cells: 0 /hpf    Red Blood Cell - Urine: 10 /hpf

## 2020-07-03 NOTE — PHYSICAL THERAPY INITIAL EVALUATION ADULT - TRANSFER TRAINING, PT EVAL
GOALS: Pt will perform all transfers with RW & Krystina in 4wks
GOAL: Pt will perform sit to/from stand transfers independently with AD as needed within 4weeks.

## 2020-07-03 NOTE — PROGRESS NOTE ADULT - SUBJECTIVE AND OBJECTIVE BOX
Patient is a 64y old  Male who presents with a chief complaint of Liver failure, hepatic encephalopathy (03 Jul 2020 09:09)       INTERVAL HPI/OVERNIGHT EVENTS:  Patient seen and evaluated at bedside.  Pt is resting comfortable in NAD.    Allergies    codeine (Anaphylaxis)    Intolerances        Vital Signs Last 24 Hrs  T(C): 37 (03 Jul 2020 09:00), Max: 37.5 (03 Jul 2020 04:00)  T(F): 98.6 (03 Jul 2020 09:00), Max: 99.5 (03 Jul 2020 04:00)  HR: 61 (03 Jul 2020 09:00) (54 - 67)  BP: 114/58 (03 Jul 2020 07:30) (109/54 - 164/77)  BP(mean): 83 (03 Jul 2020 07:30) (78 - 110)  RR: 18 (03 Jul 2020 09:00) (0 - 23)  SpO2: 100% (03 Jul 2020 09:00) (98% - 100%)    LABS:                        10.3   8.12  )-----------( 137      ( 03 Jul 2020 08:07 )             28.7     07-03    139  |  103  |  33<H>  ----------------------------<  141<H>  4.0   |  27  |  1.16    Ca    10.6<H>      03 Jul 2020 08:07  Phos  3.8     07-03  Mg     1.9     07-03    TPro  4.9<L>  /  Alb  3.0<L>  /  TBili  3.0<H>  /  DBili  1.1<H>  /  AST  546<H>  /  ALT  456<H>  /  AlkPhos  55  07-03    PT/INR - ( 03 Jul 2020 08:07 )   PT: 15.0 sec;   INR: 1.32 ratio         PTT - ( 03 Jul 2020 08:07 )  PTT:26.5 sec    CAPILLARY BLOOD GLUCOSE      POCT Blood Glucose.: 133 mg/dL (03 Jul 2020 08:49)  POCT Blood Glucose.: 140 mg/dL (03 Jul 2020 07:55)  POCT Blood Glucose.: 120 mg/dL (03 Jul 2020 06:49)  POCT Blood Glucose.: 131 mg/dL (03 Jul 2020 05:55)  POCT Blood Glucose.: 168 mg/dL (03 Jul 2020 04:56)  POCT Blood Glucose.: 186 mg/dL (03 Jul 2020 03:59)  POCT Blood Glucose.: 187 mg/dL (03 Jul 2020 03:00)  POCT Blood Glucose.: 218 mg/dL (03 Jul 2020 01:58)  POCT Blood Glucose.: 201 mg/dL (03 Jul 2020 01:01)  POCT Blood Glucose.: 195 mg/dL (02 Jul 2020 23:59)  POCT Blood Glucose.: 194 mg/dL (02 Jul 2020 23:01)  POCT Blood Glucose.: 195 mg/dL (02 Jul 2020 21:59)  POCT Blood Glucose.: 152 mg/dL (02 Jul 2020 21:01)  POCT Blood Glucose.: 191 mg/dL (02 Jul 2020 20:02)      Lower Extremity Physical Exam:  Vascular: DP 2/4 PT 1/4 B/L, CFT <3 seconds B/L, Temperature gradient warm to cool B/L  Neuro: Epicritic sensation diminished to the level of heel B/L  Musculoskeletal/Ortho: no gross deformities  Skin:   Wound #1: right foot  Location: posterior heel  Size: 6 x 4 cm  Depth: subQ  Wound bed: fibrogranular tissue with mild eschar  Drainage: none  Odor: none  Periwound: no clinical signs of infection  Etiology: pressure, diabetes

## 2020-07-03 NOTE — PROGRESS NOTE ADULT - ASSESSMENT
64M with IDDM, HLD, obesity, HFpEF with mild LV diastolic dysfunction, MGUS, chronic anemia with a history of duodenal ulcer as well as GAVE and duodenal AVM s/p APC (last on 10/11/19), decompensated JEAN/Cirrhosis (ascites/SBP/HE--UNOS listed MELD 32), h/o UTIs, emphysematous cystitis (2020) and prostate abscess (3/2020), re-admitted on  for HE, VRE UTI/GIB now s/p  donor OLT on 2020    POD#0 s/p OLTx  -stable vent settings, CXR done, read pending.   -intubated, weaned off sedation; possible extubation today  - Bedside u/s with good flow, will hold off on serial studies for now  -Discontinue [place] catheter  -May discontinue NGT later today if abd remains soft/ND  -strict I&Os and VS (JPx3, T-tube to bag--must be secure and checked frequently, high risk of kinking)   -IVF: D5 1/2 NS @ 125  -Albumin 5% 250cc X1 today  -DVT PPx: SCDs at all times.  -GI PPx given h/o GAVE:  Protonix IV QD  -Disconitnue octreotide gtt (was on it overnight given h/o GAVE)  -ABX PPX given h/o VRE UTI:  Daptomycin/Meropenem x36h; last dose tomorrow   -send surveillance blood cx and UA  -f/u surveillance Urine cx/OR cultures.  IS:  FK 4/4, MMF 1000BID, standard steroid taper, Simulect   ppx: Valcyte/Nystatin/Bactrim  Received Simulect POD0; next POD4    HTN:  -BP stable, off cardene drip.     IDDM:   -continue insulin gtt    DISPO:  -appreciate excellent SICU care      Transplant Surgery  [phone] till 7 PM today  pager [phone] afterwards 64M with IDDM, HLD, obesity, HFpEF with mild LV diastolic dysfunction, MGUS, chronic anemia with a history of duodenal ulcer as well as GAVE and duodenal AVM s/p APC (last on 10/11/19), decompensated JEAN/Cirrhosis (ascites/SBP/HE--UNOS listed MELD 32), h/o UTIs, emphysematous cystitis (2020) and prostate abscess (3/2020), re-admitted on  for HE, VRE UTI/GIB now s/p  donor OLT on 2020    POD#0 s/p OLTx  -stable vent settings, CXR done, read pending.   -intubated, weaned off sedation; possible extubation today  - Bedside u/s with good flow, will hold off on serial studies for now  -Discontinue [place] catheter  -Can remove NGT once pt is extubated  -strict I&Os and VS (JPx3, T-tube to bag--must be secure and checked frequently, high risk of kinking)   -IVF: D5 1/2 NS @ 125  -Albumin 5% 250cc X1 today  -DVT PPx: SCDs at all times.  -GI PPx given h/o GAVE:  Protonix IV QD  -Disconitnue octreotide gtt (was on it overnight given h/o GAVE)  -ABX PPX given h/o VRE UTI:  Daptomycin/Meropenem x36h; last dose tomorrow   -send surveillance blood cx and UA  -f/u surveillance Urine cx/OR cultures.  IS:  FK 4/4, MMF 1000BID, standard steroid taper, Simulect   ppx: Valcyte/Nystatin/Bactrim  Received Simulect POD0; next POD4    HTN:  -BP stable, off cardene drip.     IDDM:   -continue insulin gtt    DISPO:  -appreciate excellent SICU care      Transplant Surgery  [phone] till 7 PM today  pager [phone] afterwards

## 2020-07-03 NOTE — PHYSICAL THERAPY INITIAL EVALUATION ADULT - IMPAIRMENTS FOUND, PT EVAL
gait, locomotion, and balance/muscle strength/aerobic capacity/endurance
muscle strength/gait, locomotion, and balance/aerobic capacity/endurance

## 2020-07-03 NOTE — PHYSICAL THERAPY INITIAL EVALUATION ADULT - ACTIVE RANGE OF MOTION EXAMINATION, REHAB EVAL
bilateral  lower extremity Active ROM was WFL (within functional limits)/bilateral upper extremity Active ROM was WFL (within functional limits)
bilateral  lower extremity Active ROM was WFL (within functional limits)/bilateral upper extremity Active ROM was WFL (within functional limits)

## 2020-07-03 NOTE — PHYSICAL THERAPY INITIAL EVALUATION ADULT - GAIT TRAINING, PT EVAL
GOALS: Pt will ambulate 100ft with RW & Krystina in 4wks
GOAL: Pt will ambulate 5' with assist and AD as needed in 4wks.

## 2020-07-03 NOTE — PROGRESS NOTE ADULT - ASSESSMENT
64 M hx of DM, HLD, GERD, HFpEF with mild LV diastolic dysfunction, and decompensated JEAN cirrhosis c/b ascites with hx of SBP, HE, duodenal ulcer, GAVE and duodenal AVM s/p APC (last on 10/11/19), UNOS listed for liver transplantation at [facility]. 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 He is now re-admitted with hepatic encephalopathy and VRE UTI, also with MISA on CKD. S/p liver transplant 7/2.    Impression:  #Cirrhosis 2/2 JEAN, decompensated by ascites; listed for liver transplant  - varices: grade II on EGD 6/22  - ascites: trace on U/S this admission  - HCC: neg on U/S this admission   -HE: + asterixis, on rifaximin and lactulose   - MELD-Na = 33 on 7/1  #GI bleed with acute blood loss anemia s/p 10U PRBCs (1 on 6/17, 2 on 6/21, 1 on 6/23, 2 on 6/24, 1 on 6/26, 1 on 6/27, 2 on 6/30) and EGD 6/22, bleeding from known GAVE  #R posterior heel wound w/ overlying eschar – no signs of infections, XR neg for gas, evaluated by podiatry and ID. MRI w/o contrast limited, but no overt signs of active infection.  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    Recommendation:  - possible extubation today now that pt off sedation  - remove lines as able (per surgery/SICU)  - octreotide gtt can be stopped from hepatology perspective, would f/u w/ transplant surgery as well   - c/w cellcept, prograf, methylpred taper  - c/w nystatin, Bactrim, valcyte ppx  - close glucose control per SICU team  - remove bob when able (d/w transplant surgery) give hx of UTIs  - f/u UA  - trend Hb, GI bleed should improve w/ reduction in HWPG after transplant, will likely not need EGD for GAVE  - f/u transplant surgery recs  - rest of care per SICU team  - transplant hepatology will follow    Ze Dominguez  Gastroenterology Fellow  MONDAY-FRIDAY 8AM-5PM:  Available via [employer] Teams  Call [phone] ([facility]) or Page [phone] ([facility]) from 8am-5pm M-F  AT NIGHT AND ON WEEKENDS:  Please contact on-call GI fellow via answering service ([phone])

## 2020-07-03 NOTE — PHYSICAL THERAPY INITIAL EVALUATION ADULT - IMPAIRED TRANSFERS: BED/CHAIR, REHAB EVAL
decreased strength/decreased endurance/decreased ROM/impaired balance/cognition/impaired postural control
decreased strength/impaired balance/impaired coordination/impaired postural control

## 2020-07-03 NOTE — PROVIDER CONTACT NOTE (OTHER) - ACTION/TREATMENT ORDERED:
Transplant team at bedside, assessed patient abdomen. As per transplant team, send labs from each drain. Will continue to monitor. Transplant team at bedside, assessed patient abdomen. As per transplant team, send labs from each ANDREINA drain. Will continue to monitor.

## 2020-07-03 NOTE — PHYSICAL THERAPY INITIAL EVALUATION ADULT - TRANSFER SAFETY CONCERNS NOTED: SIT/STAND, REHAB EVAL
decreased safety awareness/stand pivot
decreased sequencing ability/non-mechanical lift/losing balance/decreased step length/decreased weight-shifting ability

## 2020-07-04 LAB
ALBUMIN SERPL ELPH-MCNC: 3 G/DL — LOW (ref 3.3–5)
ALBUMIN SERPL ELPH-MCNC: 3 G/DL — LOW (ref 3.3–5)
ALBUMIN SERPL ELPH-MCNC: 3.2 G/DL — LOW (ref 3.3–5)
ALBUMIN SERPL ELPH-MCNC: 3.2 G/DL — LOW (ref 3.3–5)
ALP SERPL-CCNC: 53 U/L — SIGNIFICANT CHANGE UP (ref 40–120)
ALP SERPL-CCNC: 53 U/L — SIGNIFICANT CHANGE UP (ref 40–120)
ALP SERPL-CCNC: 57 U/L — SIGNIFICANT CHANGE UP (ref 40–120)
ALP SERPL-CCNC: 57 U/L — SIGNIFICANT CHANGE UP (ref 40–120)
ALT FLD-CCNC: 243 U/L — HIGH (ref 10–45)
ALT FLD-CCNC: 277 U/L — HIGH (ref 10–45)
ALT FLD-CCNC: 297 U/L — HIGH (ref 10–45)
ALT FLD-CCNC: 325 U/L — HIGH (ref 10–45)
AMMONIA BLD-MCNC: 57 UMOL/L — HIGH (ref 11–55)
ANION GAP SERPL CALC-SCNC: 10 MMOL/L — SIGNIFICANT CHANGE UP (ref 5–17)
ANION GAP SERPL CALC-SCNC: 10 MMOL/L — SIGNIFICANT CHANGE UP (ref 5–17)
ANION GAP SERPL CALC-SCNC: 11 MMOL/L — SIGNIFICANT CHANGE UP (ref 5–17)
ANION GAP SERPL CALC-SCNC: 8 MMOL/L — SIGNIFICANT CHANGE UP (ref 5–17)
APTT BLD: 24 SEC — LOW (ref 27.5–35.5)
APTT BLD: 24 SEC — LOW (ref 27.5–35.5)
APTT BLD: 24.7 SEC — LOW (ref 27.5–35.5)
APTT BLD: 25.6 SEC — LOW (ref 27.5–35.5)
AST SERPL-CCNC: 128 U/L — HIGH (ref 10–40)
AST SERPL-CCNC: 169 U/L — HIGH (ref 10–40)
AST SERPL-CCNC: 76 U/L — HIGH (ref 10–40)
AST SERPL-CCNC: 98 U/L — HIGH (ref 10–40)
BILIRUB DIRECT SERPL-MCNC: 0.8 MG/DL — HIGH (ref 0–0.2)
BILIRUB DIRECT SERPL-MCNC: 1 MG/DL — HIGH (ref 0–0.2)
BILIRUB INDIRECT FLD-MCNC: 1 MG/DL — SIGNIFICANT CHANGE UP (ref 0.2–1)
BILIRUB INDIRECT FLD-MCNC: 1.1 MG/DL — HIGH (ref 0.2–1)
BILIRUB SERPL-MCNC: 1.8 MG/DL — HIGH (ref 0.2–1.2)
BILIRUB SERPL-MCNC: 2.1 MG/DL — HIGH (ref 0.2–1.2)
BUN SERPL-MCNC: 39 MG/DL — HIGH (ref 7–23)
BUN SERPL-MCNC: 42 MG/DL — HIGH (ref 7–23)
BUN SERPL-MCNC: 47 MG/DL — HIGH (ref 7–23)
BUN SERPL-MCNC: 47 MG/DL — HIGH (ref 7–23)
CALCIUM SERPL-MCNC: 10 MG/DL — SIGNIFICANT CHANGE UP (ref 8.4–10.5)
CALCIUM SERPL-MCNC: 9.8 MG/DL — SIGNIFICANT CHANGE UP (ref 8.4–10.5)
CALCIUM SERPL-MCNC: 9.8 MG/DL — SIGNIFICANT CHANGE UP (ref 8.4–10.5)
CALCIUM SERPL-MCNC: 9.9 MG/DL — SIGNIFICANT CHANGE UP (ref 8.4–10.5)
CHLORIDE SERPL-SCNC: 100 MMOL/L — SIGNIFICANT CHANGE UP (ref 96–108)
CHLORIDE SERPL-SCNC: 101 MMOL/L — SIGNIFICANT CHANGE UP (ref 96–108)
CHLORIDE SERPL-SCNC: 102 MMOL/L — SIGNIFICANT CHANGE UP (ref 96–108)
CHLORIDE SERPL-SCNC: 102 MMOL/L — SIGNIFICANT CHANGE UP (ref 96–108)
CO2 SERPL-SCNC: 23 MMOL/L — SIGNIFICANT CHANGE UP (ref 22–31)
CO2 SERPL-SCNC: 25 MMOL/L — SIGNIFICANT CHANGE UP (ref 22–31)
CO2 SERPL-SCNC: 25 MMOL/L — SIGNIFICANT CHANGE UP (ref 22–31)
CO2 SERPL-SCNC: 27 MMOL/L — SIGNIFICANT CHANGE UP (ref 22–31)
CREAT SERPL-MCNC: 1.28 MG/DL — SIGNIFICANT CHANGE UP (ref 0.5–1.3)
CREAT SERPL-MCNC: 1.31 MG/DL — HIGH (ref 0.5–1.3)
CREAT SERPL-MCNC: 1.32 MG/DL — HIGH (ref 0.5–1.3)
CREAT SERPL-MCNC: 1.35 MG/DL — HIGH (ref 0.5–1.3)
CULTURE RESULTS: NO GROWTH — SIGNIFICANT CHANGE UP
FIBRINOGEN PPP-MCNC: 258 MG/DL — LOW (ref 350–510)
FIBRINOGEN PPP-MCNC: 284 MG/DL — LOW (ref 350–510)
FIBRINOGEN PPP-MCNC: 300 MG/DL — LOW (ref 350–510)
FIBRINOGEN PPP-MCNC: 303 MG/DL — LOW (ref 350–510)
GAS PNL BLDA: SIGNIFICANT CHANGE UP
GAS PNL BLDA: SIGNIFICANT CHANGE UP
GLUCOSE BLDC GLUCOMTR-MCNC: 118 MG/DL — HIGH (ref 70–99)
GLUCOSE BLDC GLUCOMTR-MCNC: 118 MG/DL — HIGH (ref 70–99)
GLUCOSE BLDC GLUCOMTR-MCNC: 127 MG/DL — HIGH (ref 70–99)
GLUCOSE BLDC GLUCOMTR-MCNC: 132 MG/DL — HIGH (ref 70–99)
GLUCOSE BLDC GLUCOMTR-MCNC: 137 MG/DL — HIGH (ref 70–99)
GLUCOSE BLDC GLUCOMTR-MCNC: 149 MG/DL — HIGH (ref 70–99)
GLUCOSE BLDC GLUCOMTR-MCNC: 156 MG/DL — HIGH (ref 70–99)
GLUCOSE BLDC GLUCOMTR-MCNC: 166 MG/DL — HIGH (ref 70–99)
GLUCOSE BLDC GLUCOMTR-MCNC: 168 MG/DL — HIGH (ref 70–99)
GLUCOSE BLDC GLUCOMTR-MCNC: 174 MG/DL — HIGH (ref 70–99)
GLUCOSE BLDC GLUCOMTR-MCNC: 176 MG/DL — HIGH (ref 70–99)
GLUCOSE BLDC GLUCOMTR-MCNC: 187 MG/DL — HIGH (ref 70–99)
GLUCOSE BLDC GLUCOMTR-MCNC: 199 MG/DL — HIGH (ref 70–99)
GLUCOSE BLDC GLUCOMTR-MCNC: 202 MG/DL — HIGH (ref 70–99)
GLUCOSE BLDC GLUCOMTR-MCNC: 214 MG/DL — HIGH (ref 70–99)
GLUCOSE BLDC GLUCOMTR-MCNC: 222 MG/DL — HIGH (ref 70–99)
GLUCOSE SERPL-MCNC: 119 MG/DL — HIGH (ref 70–99)
GLUCOSE SERPL-MCNC: 181 MG/DL — HIGH (ref 70–99)
GLUCOSE SERPL-MCNC: 202 MG/DL — HIGH (ref 70–99)
GLUCOSE SERPL-MCNC: 250 MG/DL — HIGH (ref 70–99)
HCT VFR BLD CALC: 28.9 % — LOW (ref 39–50)
HCT VFR BLD CALC: 28.9 % — LOW (ref 39–50)
HCT VFR BLD CALC: 29 % — LOW (ref 39–50)
HCT VFR BLD CALC: 29.8 % — LOW (ref 39–50)
HGB BLD-MCNC: 10 G/DL — LOW (ref 13–17)
HGB BLD-MCNC: 10.2 G/DL — LOW (ref 13–17)
HGB BLD-MCNC: 10.3 G/DL — LOW (ref 13–17)
HGB BLD-MCNC: 10.4 G/DL — LOW (ref 13–17)
INR BLD: 1.27 RATIO — HIGH (ref 0.88–1.16)
INR BLD: 1.29 RATIO — HIGH (ref 0.88–1.16)
INR BLD: 1.3 RATIO — HIGH (ref 0.88–1.16)
INR BLD: 1.32 RATIO — HIGH (ref 0.88–1.16)
MAGNESIUM SERPL-MCNC: 1.8 MG/DL — SIGNIFICANT CHANGE UP (ref 1.6–2.6)
MAGNESIUM SERPL-MCNC: 1.9 MG/DL — SIGNIFICANT CHANGE UP (ref 1.6–2.6)
MAGNESIUM SERPL-MCNC: 2 MG/DL — SIGNIFICANT CHANGE UP (ref 1.6–2.6)
MAGNESIUM SERPL-MCNC: 2 MG/DL — SIGNIFICANT CHANGE UP (ref 1.6–2.6)
MCHC RBC-ENTMCNC: 30.7 PG — SIGNIFICANT CHANGE UP (ref 27–34)
MCHC RBC-ENTMCNC: 30.8 PG — SIGNIFICANT CHANGE UP (ref 27–34)
MCHC RBC-ENTMCNC: 31 PG — SIGNIFICANT CHANGE UP (ref 27–34)
MCHC RBC-ENTMCNC: 31.4 PG — SIGNIFICANT CHANGE UP (ref 27–34)
MCHC RBC-ENTMCNC: 34.6 GM/DL — SIGNIFICANT CHANGE UP (ref 32–36)
MCHC RBC-ENTMCNC: 34.9 GM/DL — SIGNIFICANT CHANGE UP (ref 32–36)
MCHC RBC-ENTMCNC: 35.2 GM/DL — SIGNIFICANT CHANGE UP (ref 32–36)
MCHC RBC-ENTMCNC: 35.6 GM/DL — SIGNIFICANT CHANGE UP (ref 32–36)
MCV RBC AUTO: 87.6 FL — SIGNIFICANT CHANGE UP (ref 80–100)
MCV RBC AUTO: 88.1 FL — SIGNIFICANT CHANGE UP (ref 80–100)
MCV RBC AUTO: 88.7 FL — SIGNIFICANT CHANGE UP (ref 80–100)
MCV RBC AUTO: 88.7 FL — SIGNIFICANT CHANGE UP (ref 80–100)
NRBC # BLD: 0 /100 WBCS — SIGNIFICANT CHANGE UP (ref 0–0)
PHOSPHATE SERPL-MCNC: 4.1 MG/DL — SIGNIFICANT CHANGE UP (ref 2.5–4.5)
PHOSPHATE SERPL-MCNC: 4.6 MG/DL — HIGH (ref 2.5–4.5)
PHOSPHATE SERPL-MCNC: 4.8 MG/DL — HIGH (ref 2.5–4.5)
PHOSPHATE SERPL-MCNC: 5 MG/DL — HIGH (ref 2.5–4.5)
PLATELET # BLD AUTO: 113 K/UL — LOW (ref 150–400)
PLATELET # BLD AUTO: 114 K/UL — LOW (ref 150–400)
PLATELET # BLD AUTO: 117 K/UL — LOW (ref 150–400)
PLATELET # BLD AUTO: 119 K/UL — LOW (ref 150–400)
POTASSIUM SERPL-MCNC: 4.1 MMOL/L — SIGNIFICANT CHANGE UP (ref 3.5–5.3)
POTASSIUM SERPL-MCNC: 4.2 MMOL/L — SIGNIFICANT CHANGE UP (ref 3.5–5.3)
POTASSIUM SERPL-SCNC: 4.1 MMOL/L — SIGNIFICANT CHANGE UP (ref 3.5–5.3)
POTASSIUM SERPL-SCNC: 4.2 MMOL/L — SIGNIFICANT CHANGE UP (ref 3.5–5.3)
PROT SERPL-MCNC: 4.9 G/DL — LOW (ref 6–8.3)
PROT SERPL-MCNC: 5 G/DL — LOW (ref 6–8.3)
PROT SERPL-MCNC: 5.1 G/DL — LOW (ref 6–8.3)
PROT SERPL-MCNC: 5.4 G/DL — LOW (ref 6–8.3)
PROTHROM AB SERPL-ACNC: 14.4 SEC — HIGH (ref 10.6–13.6)
PROTHROM AB SERPL-ACNC: 14.6 SEC — HIGH (ref 10.6–13.6)
PROTHROM AB SERPL-ACNC: 14.7 SEC — HIGH (ref 10.6–13.6)
PROTHROM AB SERPL-ACNC: 15 SEC — HIGH (ref 10.6–13.6)
RBC # BLD: 3.26 M/UL — LOW (ref 4.2–5.8)
RBC # BLD: 3.28 M/UL — LOW (ref 4.2–5.8)
RBC # BLD: 3.31 M/UL — LOW (ref 4.2–5.8)
RBC # BLD: 3.36 M/UL — LOW (ref 4.2–5.8)
RBC # FLD: 15.8 % — HIGH (ref 10.3–14.5)
RBC # FLD: 15.9 % — HIGH (ref 10.3–14.5)
SODIUM SERPL-SCNC: 135 MMOL/L — SIGNIFICANT CHANGE UP (ref 135–145)
SODIUM SERPL-SCNC: 135 MMOL/L — SIGNIFICANT CHANGE UP (ref 135–145)
SODIUM SERPL-SCNC: 137 MMOL/L — SIGNIFICANT CHANGE UP (ref 135–145)
SODIUM SERPL-SCNC: 137 MMOL/L — SIGNIFICANT CHANGE UP (ref 135–145)
SPECIMEN SOURCE: SIGNIFICANT CHANGE UP
TACROLIMUS SERPL-MCNC: 5.3 NG/ML — SIGNIFICANT CHANGE UP
WBC # BLD: 11.09 K/UL — HIGH (ref 3.8–10.5)
WBC # BLD: 11.68 K/UL — HIGH (ref 3.8–10.5)
WBC # BLD: 13.49 K/UL — HIGH (ref 3.8–10.5)
WBC # BLD: 14.3 K/UL — HIGH (ref 3.8–10.5)
WBC # FLD AUTO: 11.09 K/UL — HIGH (ref 3.8–10.5)
WBC # FLD AUTO: 11.68 K/UL — HIGH (ref 3.8–10.5)
WBC # FLD AUTO: 13.49 K/UL — HIGH (ref 3.8–10.5)
WBC # FLD AUTO: 14.3 K/UL — HIGH (ref 3.8–10.5)

## 2020-07-04 PROCEDURE — 99291 CRITICAL CARE FIRST HOUR: CPT

## 2020-07-04 PROCEDURE — 99233 SBSQ HOSP IP/OBS HIGH 50: CPT | Mod: GC

## 2020-07-04 PROCEDURE — 71045 X-RAY EXAM CHEST 1 VIEW: CPT | Mod: 26

## 2020-07-04 PROCEDURE — 99232 SBSQ HOSP IP/OBS MODERATE 35: CPT | Mod: GC

## 2020-07-04 RX ORDER — AMLODIPINE BESYLATE 2.5 MG/1
5 TABLET ORAL DAILY
Refills: 0 | Status: DISCONTINUED | OUTPATIENT
Start: 2020-07-04 | End: 2020-07-05

## 2020-07-04 RX ORDER — SODIUM CHLORIDE 9 MG/ML
1000 INJECTION, SOLUTION INTRAVENOUS
Refills: 0 | Status: DISCONTINUED | OUTPATIENT
Start: 2020-07-04 | End: 2020-07-05

## 2020-07-04 RX ORDER — TAMSULOSIN HYDROCHLORIDE 0.4 MG/1
0.4 CAPSULE ORAL AT BEDTIME
Refills: 0 | Status: DISCONTINUED | OUTPATIENT
Start: 2020-07-04 | End: 2020-07-12

## 2020-07-04 RX ORDER — DEXTROSE 50 % IN WATER 50 %
25 SYRINGE (ML) INTRAVENOUS
Refills: 0 | Status: DISCONTINUED | OUTPATIENT
Start: 2020-07-04 | End: 2020-07-06

## 2020-07-04 RX ORDER — INSULIN HUMAN 100 [IU]/ML
2 INJECTION, SOLUTION SUBCUTANEOUS
Qty: 100 | Refills: 0 | Status: DISCONTINUED | OUTPATIENT
Start: 2020-07-04 | End: 2020-07-05

## 2020-07-04 RX ORDER — AMLODIPINE BESYLATE 2.5 MG/1
5 TABLET ORAL ONCE
Refills: 0 | Status: COMPLETED | OUTPATIENT
Start: 2020-07-04 | End: 2020-07-04

## 2020-07-04 RX ORDER — INSULIN HUMAN 100 [IU]/ML
2 INJECTION, SOLUTION SUBCUTANEOUS ONCE
Refills: 0 | Status: COMPLETED | OUTPATIENT
Start: 2020-07-04 | End: 2020-07-04

## 2020-07-04 RX ORDER — DEXTROSE 50 % IN WATER 50 %
50 SYRINGE (ML) INTRAVENOUS
Refills: 0 | Status: DISCONTINUED | OUTPATIENT
Start: 2020-07-04 | End: 2020-07-06

## 2020-07-04 RX ORDER — INSULIN LISPRO 100/ML
VIAL (ML) SUBCUTANEOUS EVERY 4 HOURS
Refills: 0 | Status: DISCONTINUED | OUTPATIENT
Start: 2020-07-04 | End: 2020-07-04

## 2020-07-04 RX ORDER — MAGNESIUM SULFATE 500 MG/ML
2 VIAL (ML) INJECTION ONCE
Refills: 0 | Status: COMPLETED | OUTPATIENT
Start: 2020-07-04 | End: 2020-07-04

## 2020-07-04 RX ORDER — MEROPENEM 1 G/30ML
1000 INJECTION INTRAVENOUS EVERY 8 HOURS
Refills: 0 | Status: DISCONTINUED | OUTPATIENT
Start: 2020-07-04 | End: 2020-07-07

## 2020-07-04 RX ORDER — HYDROMORPHONE HYDROCHLORIDE 2 MG/ML
0.5 INJECTION INTRAMUSCULAR; INTRAVENOUS; SUBCUTANEOUS
Refills: 0 | Status: DISCONTINUED | OUTPATIENT
Start: 2020-07-04 | End: 2020-07-05

## 2020-07-04 RX ORDER — HYDRALAZINE HCL 50 MG
10 TABLET ORAL ONCE
Refills: 0 | Status: COMPLETED | OUTPATIENT
Start: 2020-07-04 | End: 2020-07-04

## 2020-07-04 RX ORDER — OXYCODONE HYDROCHLORIDE 5 MG/1
10 TABLET ORAL EVERY 4 HOURS
Refills: 0 | Status: DISCONTINUED | OUTPATIENT
Start: 2020-07-04 | End: 2020-07-09

## 2020-07-04 RX ORDER — NICARDIPINE HYDROCHLORIDE 30 MG/1
5 CAPSULE, EXTENDED RELEASE ORAL
Qty: 40 | Refills: 0 | Status: DISCONTINUED | OUTPATIENT
Start: 2020-07-04 | End: 2020-07-05

## 2020-07-04 RX ORDER — OXYCODONE HYDROCHLORIDE 5 MG/1
5 TABLET ORAL EVERY 4 HOURS
Refills: 0 | Status: DISCONTINUED | OUTPATIENT
Start: 2020-07-04 | End: 2020-07-09

## 2020-07-04 RX ADMIN — HYDROMORPHONE HYDROCHLORIDE 0.5 MILLIGRAM(S): 2 INJECTION INTRAMUSCULAR; INTRAVENOUS; SUBCUTANEOUS at 15:33

## 2020-07-04 RX ADMIN — PANTOPRAZOLE SODIUM 40 MILLIGRAM(S): 20 TABLET, DELAYED RELEASE ORAL at 05:16

## 2020-07-04 RX ADMIN — SODIUM CHLORIDE 75 MILLILITER(S): 9 INJECTION, SOLUTION INTRAVENOUS at 19:11

## 2020-07-04 RX ADMIN — SODIUM CHLORIDE 75 MILLILITER(S): 9 INJECTION, SOLUTION INTRAVENOUS at 15:33

## 2020-07-04 RX ADMIN — Medication 60 MILLIGRAM(S): at 17:21

## 2020-07-04 RX ADMIN — INSULIN HUMAN 5 UNIT(S)/HR: 100 INJECTION, SOLUTION SUBCUTANEOUS at 05:17

## 2020-07-04 RX ADMIN — Medication 100000 UNIT(S): at 05:16

## 2020-07-04 RX ADMIN — Medication 4: at 13:02

## 2020-07-04 RX ADMIN — MEROPENEM 100 MILLIGRAM(S): 1 INJECTION INTRAVENOUS at 21:05

## 2020-07-04 RX ADMIN — SODIUM CHLORIDE 75 MILLILITER(S): 9 INJECTION, SOLUTION INTRAVENOUS at 18:16

## 2020-07-04 RX ADMIN — CHLORHEXIDINE GLUCONATE 15 MILLILITER(S): 213 SOLUTION TOPICAL at 05:16

## 2020-07-04 RX ADMIN — MEROPENEM 100 MILLIGRAM(S): 1 INJECTION INTRAVENOUS at 05:17

## 2020-07-04 RX ADMIN — CHLORHEXIDINE GLUCONATE 1 APPLICATION(S): 213 SOLUTION TOPICAL at 05:15

## 2020-07-04 RX ADMIN — VALGANCICLOVIR 450 MILLIGRAM(S): 450 TABLET, FILM COATED ORAL at 11:26

## 2020-07-04 RX ADMIN — MYCOPHENOLATE MOFETIL 1000 MILLIGRAM(S): 250 CAPSULE ORAL at 07:27

## 2020-07-04 RX ADMIN — Medication 10 MILLIGRAM(S): at 17:21

## 2020-07-04 RX ADMIN — SODIUM CHLORIDE 125 MILLILITER(S): 9 INJECTION, SOLUTION INTRAVENOUS at 11:18

## 2020-07-04 RX ADMIN — SODIUM CHLORIDE 125 MILLILITER(S): 9 INJECTION, SOLUTION INTRAVENOUS at 07:28

## 2020-07-04 RX ADMIN — Medication 1 APPLICATION(S): at 11:26

## 2020-07-04 RX ADMIN — AMLODIPINE BESYLATE 5 MILLIGRAM(S): 2.5 TABLET ORAL at 17:23

## 2020-07-04 RX ADMIN — DAPTOMYCIN 116 MILLIGRAM(S): 500 INJECTION, POWDER, LYOPHILIZED, FOR SOLUTION INTRAVENOUS at 07:27

## 2020-07-04 RX ADMIN — Medication 4: at 17:39

## 2020-07-04 RX ADMIN — DEXMEDETOMIDINE HYDROCHLORIDE IN 0.9% SODIUM CHLORIDE 5.15 MICROGRAM(S)/KG/HR: 4 INJECTION INTRAVENOUS at 00:31

## 2020-07-04 RX ADMIN — MEROPENEM 100 MILLIGRAM(S): 1 INJECTION INTRAVENOUS at 14:21

## 2020-07-04 RX ADMIN — Medication 80 MILLIGRAM(S): at 11:26

## 2020-07-04 RX ADMIN — Medication 100000 UNIT(S): at 23:01

## 2020-07-04 RX ADMIN — NYSTATIN CREAM 1 APPLICATION(S): 100000 CREAM TOPICAL at 17:21

## 2020-07-04 RX ADMIN — DEXMEDETOMIDINE HYDROCHLORIDE IN 0.9% SODIUM CHLORIDE 5.15 MICROGRAM(S)/KG/HR: 4 INJECTION INTRAVENOUS at 05:17

## 2020-07-04 RX ADMIN — Medication 50 GRAM(S): at 16:36

## 2020-07-04 RX ADMIN — TAMSULOSIN HYDROCHLORIDE 0.4 MILLIGRAM(S): 0.4 CAPSULE ORAL at 21:25

## 2020-07-04 RX ADMIN — NYSTATIN CREAM 1 APPLICATION(S): 100000 CREAM TOPICAL at 05:16

## 2020-07-04 RX ADMIN — HYDROMORPHONE HYDROCHLORIDE 0.5 MILLIGRAM(S): 2 INJECTION INTRAMUSCULAR; INTRAVENOUS; SUBCUTANEOUS at 23:00

## 2020-07-04 RX ADMIN — OXYCODONE HYDROCHLORIDE 10 MILLIGRAM(S): 5 TABLET ORAL at 21:00

## 2020-07-04 RX ADMIN — DEXMEDETOMIDINE HYDROCHLORIDE IN 0.9% SODIUM CHLORIDE 5.15 MICROGRAM(S)/KG/HR: 4 INJECTION INTRAVENOUS at 07:26

## 2020-07-04 RX ADMIN — Medication 60 MILLIGRAM(S): at 05:17

## 2020-07-04 RX ADMIN — TACROLIMUS 4 MILLIGRAM(S): 5 CAPSULE ORAL at 19:31

## 2020-07-04 RX ADMIN — INSULIN HUMAN 2 UNIT(S): 100 INJECTION, SOLUTION SUBCUTANEOUS at 19:39

## 2020-07-04 RX ADMIN — INSULIN HUMAN 5 UNIT(S)/HR: 100 INJECTION, SOLUTION SUBCUTANEOUS at 07:27

## 2020-07-04 RX ADMIN — TACROLIMUS 4 MILLIGRAM(S): 5 CAPSULE ORAL at 07:27

## 2020-07-04 RX ADMIN — INSULIN HUMAN 2 UNIT(S)/HR: 100 INJECTION, SOLUTION SUBCUTANEOUS at 19:39

## 2020-07-04 RX ADMIN — NICARDIPINE HYDROCHLORIDE 25 MG/HR: 30 CAPSULE, EXTENDED RELEASE ORAL at 00:31

## 2020-07-04 RX ADMIN — MYCOPHENOLATE MOFETIL 1000 MILLIGRAM(S): 250 CAPSULE ORAL at 19:30

## 2020-07-04 RX ADMIN — OXYCODONE HYDROCHLORIDE 10 MILLIGRAM(S): 5 TABLET ORAL at 16:37

## 2020-07-04 RX ADMIN — NICARDIPINE HYDROCHLORIDE 25 MG/HR: 30 CAPSULE, EXTENDED RELEASE ORAL at 23:00

## 2020-07-04 RX ADMIN — Medication 100000 UNIT(S): at 11:26

## 2020-07-04 RX ADMIN — SODIUM CHLORIDE 125 MILLILITER(S): 9 INJECTION, SOLUTION INTRAVENOUS at 05:17

## 2020-07-04 RX ADMIN — Medication 100000 UNIT(S): at 18:16

## 2020-07-04 RX ADMIN — Medication 100000 UNIT(S): at 00:31

## 2020-07-04 NOTE — OCCUPATIONAL THERAPY INITIAL EVALUATION ADULT - ADL RETRAINING, OT EVAL
GOAL: Pt will be independent with upper body dressing in 4 weeks.
GOAL: To complete LB/UB dressing I in 4 weeks

## 2020-07-04 NOTE — OCCUPATIONAL THERAPY INITIAL EVALUATION ADULT - IMPAIRMENTS CONTRIBUTING IMPAIRED BED MOBILITY, REHAB EVAL
impaired balance/impaired postural control/decreased ROM/decreased strength
decreased ROM/decreased strength/cognition/impaired balance

## 2020-07-04 NOTE — OCCUPATIONAL THERAPY INITIAL EVALUATION ADULT - PLANNED THERAPY INTERVENTIONS, OT EVAL
transfer training/bed mobility training/ADL retraining/balance training/strengthening/cognitive, visual perceptual
transfer training/balance training/bed mobility training/strengthening/ADL retraining

## 2020-07-04 NOTE — OCCUPATIONAL THERAPY INITIAL EVALUATION ADULT - PHYSICAL ASSIST/NONPHYSICAL ASSIST: STAND/SIT, REHAB EVAL
verbal cues/2 person assist/nonverbal cues (demo/gestures)
nonverbal cues (demo/gestures)/2 person assist

## 2020-07-04 NOTE — PROGRESS NOTE ADULT - SUBJECTIVE AND OBJECTIVE BOX
Chief Complaint:  Patient is a 64y old  Male who presents with a chief complaint of Liver failure, hepatic encephalopathy (2020 12:22)    Reason for consult: cirrhosis, s/p liver transplant    Interval Events: Pt extubated, doing well.     Hospital Medications:  chlorhexidine 2% Cloths 1 Application(s) Topical <User Schedule>  chlorhexidine 4% Liquid 1 Application(s) Topical <User Schedule>  collagenase Ointment 1 Application(s) Topical daily  dextrose 5% + sodium chloride 0.45%. 1000 milliLiter(s) IV Continuous <Continuous>  HYDROmorphone  Injectable 0.5 milliGRAM(s) IV Push every 3 hours PRN  insulin lispro (HumaLOG) corrective regimen sliding scale   SubCutaneous every 4 hours  labetalol Injectable 10 milliGRAM(s) IV Push every 1 hour PRN  meropenem  IVPB 1000 milliGRAM(s) IV Intermittent every 8 hours  methylPREDNISolone sodium succinate Injectable 60 milliGRAM(s) IV Push two times a day  mycophenolate mofetil Suspension 1000 milliGRAM(s) Oral <User Schedule>  nystatin    Suspension 888533 Unit(s) Swish and Swallow four times a day  nystatin Cream 1 Application(s) Topical two times a day  pantoprazole  Injectable 40 milliGRAM(s) IV Push <User Schedule>  sodium chloride 0.9% lock flush 10 milliLiter(s) IV Push every 1 hour PRN  tacrolimus    0.5 mG/mL Suspension 4 milliGRAM(s) Oral <User Schedule>  trimethoprim  40 mG/sulfamethoxazole 200 mG Suspension 80 milliGRAM(s) Oral daily  valGANciclovir 50 mG/mL Oral Solution 450 milliGRAM(s) Oral daily      ROS: Pt unable to provide    PHYSICAL EXAM:   Vital Signs:  Vital Signs Last 24 Hrs  T(C): 36.2 (2020 12:00), Max: 36.8 (2020 15:00)  T(F): 97.2 (2020 12:00), Max: 98.2 (2020 15:00)  HR: 75 (2020 12:26) (52 - 75)  BP: 138/71 (2020 19:00) (138/71 - 138/71)  BP(mean): 98 (2020 19:00) (98 - 98)  RR: 19 (2020 12:26) (8 - 24)  SpO2: 100% (2020 12:26) (97% - 100%)  Daily     Daily Weight in k.2 (2020 00:54)    GENERAL: no acute distress, facemask on  NEURO: alert, no asterixis  HEENT: icteric sclera, no conjunctival pallor appreciated  CHEST: no respiratory distress, no accessory muscle use  CARDIAC: regular rate, rhythm  ABDOMEN: soft, incisions w/ minimal drainage, c/d/i, ANDREINA drains in place w/ serosanguinous drainage, bilious drainage in T-tube  EXTREMITIES: warm, well perfused, no edema  SKIN: no lesions noted    LABS: reviewed                        10.2   11.68 )-----------( 114      ( 2020 08:10 )             29.0     07-04    137  |  102  |  42<H>  ----------------------------<  119<H>  4.2   |  25  |  1.31<H>    Ca    9.8      2020 08:10  Phos  4.6     07-04  Mg     1.9     07-04    TPro  5.0<L>  /  Alb  3.0<L>  /  TBili  1.8<H>  /  DBili  0.8<H>  /  AST  128<H>  /  ALT  297<H>  /  AlkPhos  53  07-04    LIVER FUNCTIONS - ( 2020 08:10 )  Alb: 3.0 g/dL / Pro: 5.0 g/dL / ALK PHOS: 53 U/L / ALT: 297 U/L / AST: 128 U/L / GGT: x             Interval Diagnostic Studies: see sunrise for full report

## 2020-07-04 NOTE — PROGRESS NOTE ADULT - ASSESSMENT
PLAN:  Neurologic: hepatic encephalopathy. Sedation post-op  - Precedex gtt for sedation  - PRN dilaudid     Respiratory: Intubated  - Continue PRVC 14/500/5/40%  - SBT in the morning     Cardiovascular: INtermittent hypertension   - Cardeene gtt for tight BP cointrol. Goal SBO , 145mmHg   - L. Radial A-line    Gastrointestinal/Nutrition: GIB, JEAN cirrhosis, GAVE syndrome. S/p liver transplant   - NPO/OGT   - Protonix 40mg. daily    Renal: urinary retention, hyponatremia  - D5/0.45NS @125ml/hr     Heme: coagulopathy  - Venodynes for mechanical VTE prophylaxis, holding chemical VTE prophylaxis in the setting of coagulopathy/GIB    Infectious Disease: Emperic Coverage and prophyaxis in setting of immunosuppresion   - Meropenem/ daptomycin 36 hrs post-op  - Conitnue PPx as per trasnplant: Bactrim, valcyte  - Continue immunosuppresion as per tranplant; solumedrol    Endo: DM type II, HLD  - Weaned off insulin gtt     Disposition:  - SICU full code   PLAN:  Neurologic: hepatic encephalopathy. Sedation post-op  - Precedex gtt for sedation  - PRN dilaudid     Respiratory: Intubated  - Continue PRVC 14/500/5/40%  - SBT in the morning     Cardiovascular: INtermittent hypertension   - Cardeene gtt for tight BP cointrol. Goal SBO , 145mmHg   - L. Radial A-line    Gastrointestinal/Nutrition: GIB, JEAN cirrhosis, GAVE syndrome. S/p liver transplant   - NPO/OGT   - Protonix 40mg. daily    Renal: urinary retention, hyponatremia  - D5/0.45NS @125ml/hr     Heme: coagulopathy  - Venodynes for mechanical VTE prophylaxis, holding chemical VTE prophylaxis in the setting of coagulopathy/GIB    Infectious Disease: Emperic Coverage and prophyaxis in setting of immunosuppresion   - Meropenem/ daptomycin 36 hrs post-op  - Conitnue PPx as per trasnplant: Bactrim, valcyte  - Continue immunosuppresion as per tranplant; solumedrol    Endo: DM type II, HLD  - Weaned off insulin gtt     Disposition:  - SICU full code PLAN:  Neurologic: hepatic encephalopathy. Sedation post-op  - Precedex gtt for sedation  - PRN dilaudid     Respiratory: Intubated  - Continue PRVC 14/500/5/40%  - SBT in the morning     Cardiovascular: INtermittent hypertension   - Cardeene gtt for tight BP cointrol. Goal SBO , 145mmHg   - L. Radial A-line    Gastrointestinal/Nutrition: GIB, JEAN cirrhosis, GAVE syndrome. S/p liver transplant   - NPO/OGT   - Protonix 40mg. daily    Renal: urinary retention, hyponatremia  - D5/0.45NS @125ml/hr     Heme: coagulopathy  - Venodynes for mechanical VTE prophylaxis, holding chemical VTE prophylaxis in the setting of coagulopathy/GIB    Infectious Disease: Emperic Coverage and prophyaxis in setting of immunosuppresion   - Meropenem/ daptomycin 36 hrs post-op  - Conitnue PPx as per trasnplant: Bactrim, valcyte  - Continue immunosuppresion as per tranplant; solumedrol    Endo: DM type II, HLD  - Weaned off insulin gtt     Disposition:  - SICU full code

## 2020-07-04 NOTE — PROGRESS NOTE ADULT - ATTENDING COMMENTS
Patient seen and examined  Above verified  Agree with above unless as noted below.  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   UCx 6/14 with VRE  complicated hospital course  Now s/p OLT for worsening status  In SICU-intubated  otherwise hemodynamically stable    A) OLT  continue periop dapto and merem  per protocol(see below for ulcer)  Monitor for any s/s complications/nosocomial infection  continue bactrim,valcyte for prophylaxis  Other plan per transplant team    B)Pancytopenia, --s/p Filgrastim on 6/30  -Continue to follow CBC with diff    C)R Heel Eschar  some dicharge though no erythema or obvious purulence   continue wound acre  case d/w Dr toro-in view of immunosuppression to continue current abx for now   Monitor wound     D)GIB - s/p EGD with Acute gastritis and duodenitis with hemorrhage  --Management per GI  --Continue to follow CBC with diff      Will tailor plan for ID issues  per course,results.Will defer to primary team on management of other issues.  Assessment, plan and recommendations as detailed above were discussed with the SICU   team,Dr Toro(transplant)    Infectious Diseases Service will cover over weekend.  Please call [phone] if issues

## 2020-07-04 NOTE — OCCUPATIONAL THERAPY INITIAL EVALUATION ADULT - PERTINENT HX OF CURRENT PROBLEM, REHAB EVAL
64M PMHx of decompensated JEAN cirrhosis, HTn, HLD, DM, HFpEF (EF 70%) presents with reports of confusion. Saw his hepatologist yesterday was noted to be more jaundiced and more confused than normal. Per wife his was not oriented to time and place, and forgot how many grandchildren he had. He also urinated in his bedroom. He was confused for roughly 12 hours. He is now back to his baseline mental status.
64M PMHx of decompensated JEAN cirrhosis, HTn, HLD, DM, HFpEF (EF 70%) presents with reports of confusion. Saw his hepatologist yesterday was noted to be more jaundiced and more confused than normal. Per wife his was not oriented to time and place, and forgot how many grandchildren he had. He also urinated in his bedroom. He was confused for roughly 12 hours. He is now back to his baseline mental status.

## 2020-07-04 NOTE — PROGRESS NOTE ADULT - SUBJECTIVE AND OBJECTIVE BOX
HISTORY  64y Male c/b small esophageal varices (12/2019), portal hypertensive gastropathy, ascites, hx SBP, hx hepatic encephalopathy, GAVE syndrome s/p APC, hx duodenal ulcer (5/2019), HTN, HLD, DM, HFpEF (EF 70%), recent ESBL E coli prostate abscess (3/2020 s/p 4 weeks ertapenem), orthostatic hypotension ( on midodrine) who was initially admitted to medicine for hepatic encephalopathy. Hospital course c/b MISA, VRE UTI, acute blood loss anemia, received blood transfusion, EGD done 6/22 with Grade II esophageal varices, acute gastritis with hemorrhage and acute duodenitis with hemorrhage. Patient had worsening mental status, concerning decompensating liver cirrhosis, hepatic encephalopathy patient then transferred to SICU for further care.        24 HOUR EVENTS:  - Fentanyl gtt discontinued in the day   - Octreotide gtt discontinued   - 250ml of 5% albumin administered   - 40mg of lasix administered    - [place] discontinued  - ANDREINA bilirubin level sent for all three JPs       SUBJECTIVE/ROS:  [ ] A ten-point review of systems was otherwise negative except as noted.  [ ] Due to altered mental status/intubation, subjective information were not able to be obtained from the patient. History was obtained, to the extent possible, from review of the chart and collateral sources of information.      NEURO  Exam: RASS -1   Meds: dexMEDEtomidine Infusion 0.2 MICROgram(s)/kG/Hr IV Continuous <Continuous>  HYDROmorphone  Injectable 0.5 milliGRAM(s) IV Push every 2 hours PRN vent dyschrony    [x] Adequacy of sedation and pain control has been assessed and adjusted      RESPIRATORY  RR: 18 (07-04-20 @ 06:00) (14 - 24)  SpO2: 100% (07-04-20 @ 06:00) (99% - 100%)  Exam: unlabored, clear to auscultation bilaterally  Mechanical Ventilation: Mode: AC/ CMV (Assist Control/ Continuous Mandatory Ventilation), RR (machine): 14, RR (patient): 14, TV (machine): 500, FiO2: 40, PEEP: 5, ITime: 0.9, MAP: 8, PIP: 18  ABG - ( 04 Jul 2020 02:06 )  pH: 7.39  /  pCO2: 49    /  pO2: 142   / HCO3: 29    / Base Excess: 3.8   /  SaO2: 99      Lactate: x      [N/A] Extubation Readiness Assessed  Meds: none      CARDIOVASCULAR  HR: 52 (07-04-20 @ 06:00) (52 - 71)  BP: 138/71 (07-03-20 @ 19:00) (114/58 - 138/71)  BP(mean): 98 (07-03-20 @ 19:00) (83 - 98)  ABP: 140/55 (07-04-20 @ 06:00) (121/42 - 157/54)  ABP(mean): 84 (07-04-20 @ 06:00) (63 - 92)  VBG - ( 02 Jul 2020 09:33 )  pH: 7.39  /  pCO2: 46    /  pO2: 70    / HCO3: 27    / Base Excess: 2.2   /  SaO2: 92     Lactate: 1.6    Exam: regular rate and rhythm  Cardiac Rhythm: sinus  Perfusion     [x]Adequate   [ ]Inadequate  Mentation   [x]Normal       [ ]Reduced  Extremities  [x]Warm         [ ]Cool  Volume Status [ ]Hypervolemic [x]Euvolemic [ ]Hypovolemic  Meds: labetalol Injectable 10 milliGRAM(s) IV Push every 1 hour PRN systolic blood pressure GREATER THAN 170 mmHg or diastolic blood pressure GREATER THAN 100 mmHg  niCARdipine Infusion 5 mG/Hr IV Continuous <Continuous>        GI/NUTRITION  Exam: soft, nontender, nondistended  Diet: NPO   Meds: pantoprazole  Injectable 40 milliGRAM(s) IV Push <User Schedule>      GENITOURINARY  I&O's Detail    07-02 @ 07:01  -  07-03 @ 07:00  --------------------------------------------------------  IN:    dexmedetomidine Infusion: 157.9 mL    dextrose 5% + sodium chloride 0.45%.: 1375 mL    Enteral Tube Flush: 90 mL    fentaNYL   Infusion: 57.2 mL    insulin regular Infusion: 89 mL    niCARdipine Infusion: 62.5 mL    octreotide  Infusion: 64 mL    octreotide  Infusion: 30 mL    octreotide  Infusion: 4 mL    Solution: 50 mL    Solution: 300 mL  Total IN: 2279.6 mL    OUT:    Drain: 105 mL    Drain: 80 mL    Drain: 230 mL    Indwelling Catheter - Urethral: 2860 mL    Nasoenteral Tube: 25 mL  Total OUT: 3300 mL    Total NET: -1020.4 mL      07-03 @ 07:01  -  07-04 @ 06:29  --------------------------------------------------------  IN:    Albumin 5%  - 250 mL: 250 mL    dexmedetomidine Infusion: 249.9 mL    dextrose 5% + sodium chloride 0.45%.: 2875 mL    Enteral Tube Flush: 280 mL    insulin regular Infusion: 93 mL    niCARdipine Infusion: 75 mL    octreotide  Infusion: 4 mL    Solution: 300 mL  Total IN: 4126.9 mL    OUT:    Drain: 78 mL    Drain: 137 mL    Drain: 64 mL    Indwelling Catheter - Urethral: 1650 mL    Nasoenteral Tube: 400 mL  Total OUT: 2329 mL    Total NET: 1797.9 mL          07-04    137  |  102  |  39<H>  ----------------------------<  181<H>  4.2   |  25  |  1.32<H>    Ca    10.0      04 Jul 2020 02:07  Phos  5.0     07-04  Mg     2.0     07-04    TPro  4.9<L>  /  Alb  3.0<L>  /  TBili  1.8<H>  /  DBili  0.8<H>  /  AST  169<H>  /  ALT  325<H>  /  AlkPhos  53  07-04    [x] Beasley catheter, indication: urine output monitoring in critically ill   Meds: dextrose 5% + sodium chloride 0.45%. 1000 milliLiter(s) IV Continuous <Continuous>  sodium chloride 0.9% lock flush 10 milliLiter(s) IV Push every 1 hour PRN Pre/post blood products, medications, blood draw, and to maintain line patency        HEMATOLOGIC  Meds: none  [x] VTE Prophylaxis                        10.3   11.09 )-----------( 117      ( 04 Jul 2020 02:07 )             28.9     PT/INR - ( 04 Jul 2020 02:07 )   PT: 15.0 sec;   INR: 1.32 ratio         PTT - ( 04 Jul 2020 02:07 )  PTT:24.7 sec  Transfusion     [ ] PRBC   [ ] Platelets   [ ] FFP   [ ] Cryoprecipitate      INFECTIOUS DISEASES  WBC Count: 11.09 K/uL (07-04 @ 02:07)  WBC Count: 12.12 K/uL (07-03 @ 20:12)  WBC Count: 10.43 K/uL (07-03 @ 14:11)  WBC Count: 8.12 K/uL (07-03 @ 08:07)    RECENT CULTURES:  Specimen Source: .Surgical Swab abdominal ascites culture  Date/Time: 07-03 @ 01:05  Culture Results:   No growth  Gram Stain: --  Organism: --    Meds: DAPTOmycin IVPB 400 milliGRAM(s) IV Intermittent every 24 hours  meropenem  IVPB 1000 milliGRAM(s) IV Intermittent every 8 hours  mycophenolate mofetil Suspension 1000 milliGRAM(s) Oral <User Schedule>  nystatin    Suspension [id number] Unit(s) Swish and Swallow four times a day  tacrolimus    0.5 mG/mL Suspension 4 milliGRAM(s) Oral <User Schedule>  trimethoprim  40 mG/sulfamethoxazole 200 mG Suspension 80 milliGRAM(s) Oral daily  valGANciclovir 50 mG/mL Oral Solution 450 milliGRAM(s) Oral daily        ENDOCRINE  CAPILLARY BLOOD GLUCOSE      POCT Blood Glucose.: 127 mg/dL (04 Jul 2020 06:03)  POCT Blood Glucose.: 132 mg/dL (04 Jul 2020 04:59)  POCT Blood Glucose.: 137 mg/dL (04 Jul 2020 04:00)  POCT Blood Glucose.: 156 mg/dL (04 Jul 2020 03:07)  POCT Blood Glucose.: 174 mg/dL (04 Jul 2020 02:00)  POCT Blood Glucose.: 202 mg/dL (04 Jul 2020 00:56)  POCT Blood Glucose.: 187 mg/dL (04 Jul 2020 00:02)  POCT Blood Glucose.: 197 mg/dL (03 Jul 2020 23:07)  POCT Blood Glucose.: 200 mg/dL (03 Jul 2020 21:59)  POCT Blood Glucose.: 173 mg/dL (03 Jul 2020 21:12)  POCT Blood Glucose.: 201 mg/dL (03 Jul 2020 19:57)  POCT Blood Glucose.: 188 mg/dL (03 Jul 2020 18:46)  POCT Blood Glucose.: 197 mg/dL (03 Jul 2020 18:05)  POCT Blood Glucose.: 156 mg/dL (03 Jul 2020 16:52)  POCT Blood Glucose.: 183 mg/dL (03 Jul 2020 15:57)  POCT Blood Glucose.: 231 mg/dL (03 Jul 2020 15:56)  POCT Blood Glucose.: 191 mg/dL (03 Jul 2020 14:54)  POCT Blood Glucose.: 189 mg/dL (03 Jul 2020 13:58)  POCT Blood Glucose.: 146 mg/dL (03 Jul 2020 13:00)  POCT Blood Glucose.: 99 mg/dL (03 Jul 2020 11:58)  POCT Blood Glucose.: 111 mg/dL (03 Jul 2020 11:18)  POCT Blood Glucose.: 96 mg/dL (03 Jul 2020 10:52)  POCT Blood Glucose.: 121 mg/dL (03 Jul 2020 10:02)  POCT Blood Glucose.: 133 mg/dL (03 Jul 2020 08:49)  POCT Blood Glucose.: 140 mg/dL (03 Jul 2020 07:55)  POCT Blood Glucose.: 120 mg/dL (03 Jul 2020 06:49)    Meds: insulin regular Infusion 5 Unit(s)/Hr IV Continuous <Continuous>  methylPREDNISolone sodium succinate Injectable 60 milliGRAM(s) IV Push two times a day        ACCESS DEVICES:  [x] Peripheral IV  [x] Central Venous Line	[x ] R	[ ] L	[x ] IJ	[ ] Fem	[ ] SC	Placed:   [ ] Arterial Line		[ ] R	[ ] L	[ ] Fem	[ ] Rad	[ ] Ax	Placed:   [ ] PICC:					[ ] Mediport  [x] Urinary Catheter, Date Placed:   [x] Necessity of urinary, arterial, and venous catheters discussed    OTHER MEDICATIONS:  chlorhexidine 0.12% Liquid 15 milliLiter(s) Oral Mucosa every 12 hours  chlorhexidine 2% Cloths 1 Application(s) Topical <User Schedule>  chlorhexidine 4% Liquid 1 Application(s) Topical <User Schedule>  collagenase Ointment 1 Application(s) Topical daily  nystatin Cream 1 Application(s) Topical two times a day      CODE STATUS: full code       IMAGING: < from: CT Pelvis No Cont (06.12.20 @ 17:37) >  FINDINGS:  BLADDER: Within normal limits.  REPRODUCTIVE ORGANS: The prostate is normal in size with a few foci of calcifications. No discrete fluid collection is seen.  LYMPH NODES: No pelvic lymphadenopathy.    VISUALIZED PORTIONS:  ABDOMINAL ORGANS: 0.6 cm no obstructive calculus in the lower pole of the left kidney is unchanged.  BOWEL: Moderate to large amount of stool and rectum and colon. No small bowel obstruction.  PERITONEUM: No ascites.  VESSELS: Arterial calcifications.  ABDOMINAL WALL: Within normal limits.  BONES: Degenerative changes.    IMPRESSION:     Evaluation without intravenous contrast. No prostate abscess noted within the limitations of the exam.    < end of copied text >

## 2020-07-04 NOTE — OCCUPATIONAL THERAPY INITIAL EVALUATION ADULT - RANGE OF MOTION EXAMINATION, UPPER EXTREMITY
bilateral UE Active ROM was WNL (within normal limits)
bilateral UE Active ROM was WFL  (within functional limits)

## 2020-07-04 NOTE — OCCUPATIONAL THERAPY INITIAL EVALUATION ADULT - IMPAIRED TRANSFERS: SIT/STAND, REHAB EVAL
decreased strength/decreased ROM/impaired balance
impaired motor control/impaired balance/impaired postural control/decreased ROM/decreased strength

## 2020-07-04 NOTE — OCCUPATIONAL THERAPY INITIAL EVALUATION ADULT - GENERAL OBSERVATIONS, REHAB EVAL
Pt received semi-sup in bed, +ICU monitoring, +bob.
Pt received semi-sup in bed, +ICU monitoring, +bob, + A line, +2L O2 via NC +3 ANDREINA drains

## 2020-07-04 NOTE — PROGRESS NOTE ADULT - ASSESSMENT
64 M hx of DM, HLD, GERD, HFpEF with mild LV diastolic dysfunction, and decompensated JEAN cirrhosis c/b ascites with hx of SBP, HE, duodenal ulcer, GAVE and duodenal AVM s/p APC (last on 10/11/19), UNOS listed for liver transplantation at [facility]. 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 He is now re-admitted with hepatic encephalopathy and VRE UTI, also with MISA on CKD. S/p liver transplant 7/2.    Impression:  #S/p liver transplant 7/2:  OR details:  - L groin cutdown for vv bypass   - anastomosis: bicaval end-end/CBD duct-duct/HA & PV end-end, all standard anatomy.    - IOC with good flow  - Simulect induction. 500mg Solumedrol  - JPs x3 with T-Tube  - 14U pRBC, 14U FFP, 10 PLT, 11 CRYO, 500mL returned from cell saver, EBL 5L, UOP 1100mL  Recipient Info:   ABO: A  CMV:  Neg  EBV Positive  Donor:  Donor ID:  [id number] Match: [id number]  Age: 29 ABO:  A1 High Risk:   No COD: Cardiovascular  Anti CMV positive, EBV IgG- positive  HepBcAb-neg, Hepatitis C-DANA- neg, Hepatitis C ab-neg    #GI bleed with acute blood loss anemia s/p 10U PRBCs, bleeding from known GAVE, Hb stable post-transplant  #R posterior heel wound w/ overlying eschar – no signs of infections, XR neg for gas, evaluated by podiatry and ID. MRI w/o contrast limited, but no overt signs of active infection.  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    Recommendation:  - remove lines/bob as able (per surgery/SICU)  - c/w cellcept, prograf, methylpred taper  - close glucose control while on steroids  - c/w IV meropenem, f/u ID recs  - c/w nystatin, Bactrim, valcyte ppx  - close glucose control per SICU team  - trend Hb, GI bleed should improve w/ reduction in HWPG after transplant, will likely not need EGD for GAVE  - advance diet, low Na diet  - f/u wound care/nursing for R heel ulcer  - f/u transplant surgery recs  - rest of care per SICU team  - transplant hepatology will follow    Ze Dominguez  Gastroenterology Fellow  MONDAY-FRIDAY 8AM-5PM:  Available via [employer] Teams  Call [phone] ([facility]) or Page [phone] ([facility]) from 8am-5pm M-F  AT NIGHT AND ON WEEKENDS:  Please contact on-call GI fellow via answering service ([phone])

## 2020-07-04 NOTE — OCCUPATIONAL THERAPY INITIAL EVALUATION ADULT - LIVES WITH, PROFILE
Pt lives with wife in a pvt home, ramp to enter and one level inside. Pt has a tub shower and shower chair, utilizing WC PTA.
Pt lives with wife in a pvt home, ramp to enter and one level inside. Pt has a tub shower and shower chair, utilizing WC PTA.

## 2020-07-04 NOTE — OCCUPATIONAL THERAPY INITIAL EVALUATION ADULT - LEVEL OF INDEPENDENCE: STAND/SIT, REHAB EVAL
nonmechanical sit to stand device/moderate assist (50% patients effort)
minimum assist (75% patients effort)

## 2020-07-04 NOTE — OCCUPATIONAL THERAPY INITIAL EVALUATION ADULT - DIAGNOSIS, OT EVAL
Pt p/w decreased balance, strength, and cognition impacting ADLs
Pt p/w decreased balance, strength, and cognition impacting ADLs and ability to participate in functional mobility,

## 2020-07-04 NOTE — PROGRESS NOTE ADULT - ATTENDING COMMENTS
Patient post liver transplant. He remains on antibiotics as steroid induction tapered and right foot ulcer managed.  Now extubated.  Suggest d/c bob.  Continued close monitoring of glucose levels given use of high dose steroids in diabetic.  May begin PO intake per surgery.

## 2020-07-04 NOTE — OCCUPATIONAL THERAPY INITIAL EVALUATION ADULT - PHYSICAL ASSIST/NONPHYSICAL ASSIST: SIT/STAND, REHAB EVAL
nonverbal cues (demo/gestures)/2 person assist/verbal cues
nonverbal cues (demo/gestures)/verbal cues/2 person assist

## 2020-07-04 NOTE — OCCUPATIONAL THERAPY INITIAL EVALUATION ADULT - BED MOBILITY TRAINING, PT EVAL
GOAL: Pt will perform bed mobility independently in 4 weeks
GOAL: Pt will complete sup->sit I in 4 weeks

## 2020-07-04 NOTE — OCCUPATIONAL THERAPY INITIAL EVALUATION ADULT - NS ASR FOLLOW COMMAND OT EVAL
able to follow single-step instructions/able to follow multistep instructions/100% of the time
100% of the time/able to follow single-step instructions/slow processing speed

## 2020-07-04 NOTE — PROGRESS NOTE ADULT - ATTENDING COMMENTS
Dr. Mace (Attending Physician)  N - Hepatic Encephalopathy Ammonia, Serum: 57 umol/L (07.04.20 @ 02:07)  P - Acute Resp Failure was unable to extubate yesterday bc of diminished mental status, better today, doing well on SBT sending abg, likely extubate today.Blood Gas Profile - Arterial (07.04.20 @ 02:06)    pH, Arterial: 7.39    pCO2, Arterial: 49 mmHg    pO2, Arterial: 142 mmHg    HCO3, Arterial: 29 mmoL/L    Base Excess, Arterial: 3.8 mmol/L    Oxygen Saturation, Arterial: 99 %    Total CO2, Arterial: 30 mmoL/L    FIO2, Arterial: 40  C - HTN requiring nicardipine overnight, now off  GI - JEAN cirrhosis, t-tube without output   - CKD stage 2, Cr stable Creatinine, Serum: 1.31 mg/dL (07.04.20 @ 08:10)  H - INR: 1.32, Hemoglobin: 10.2 g/dL (07.04.20 @ 08:10), Platelet Count - Automated: 114 K/uL (07.04.20 @ 08:10),   ID - periop abx, dapto, meropenem, pcp, fungal, nystatin  E - insulin gtt at 3

## 2020-07-04 NOTE — AIRWAY REMOVAL NOTE  ADULT & PEDS - ARTIFICAL AIRWAY REMOVAL COMMENTS
Written order for extubation is verified. Pt was identified by full name birthday compared to the identifcation band. Present during the procedure was Nilda Hamm)

## 2020-07-04 NOTE — OCCUPATIONAL THERAPY INITIAL EVALUATION ADULT - MUSCLE TONE ASSESSMENT, REHAB EVAL
right-side extremities/normal/left-side extremities
normal/left-side extremities/right-side extremities

## 2020-07-04 NOTE — PROGRESS NOTE ADULT - ASSESSMENT
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    [] s/p OLTx  - good graft function, LFTs trending down, t bili down to 1.8  - Afebrile, Stable VSS, extubated this am  - Immuno: FK by level, MMF 1/1, pred taper, Simulect induction   - PPX: valcyte/bactrim/nystatin/protonix iv  - ASA on hold in setting of recent UGI bleed  - Strict I&Os: JPs x 3, T tube to gravity, please d/c bob in the afternoon; please d/c NGT today   - Diet: advance as tolerated  - DVT PPx: SCDs at all times.  - ID: continue meropenem, will discuss LOT, dapto dced today  - Cont ICU care

## 2020-07-04 NOTE — PROGRESS NOTE ADULT - SUBJECTIVE AND OBJECTIVE BOX
Follow Up: Post-OLT    Interval History/ROS:Patient is a 64y old  Male who presents with a chief complaint of Liver failure, hepatic encephalopathy (2020 06:28)  - bradycardic overnight - thought to be 2/2 Dexmedetomidine  - leucocytosis noted in AM labs  - patient assessed at bedside. Plan for extubation today    Allergies    codeine (Anaphylaxis)    Intolerances        ANTIMICROBIALS:  meropenem  IVPB 1000 every 8 hours  nystatin    Suspension 232430 four times a day  trimethoprim  40 mG/sulfamethoxazole 200 mG Suspension 80 daily  valGANciclovir 50 mG/mL Oral Solution 450 daily      OTHER MEDS:  chlorhexidine 2% Cloths 1 Application(s) Topical <User Schedule>  chlorhexidine 4% Liquid 1 Application(s) Topical <User Schedule>  collagenase Ointment 1 Application(s) Topical daily  dextrose 5% + sodium chloride 0.45%. 1000 milliLiter(s) IV Continuous <Continuous>  HYDROmorphone  Injectable 0.5 milliGRAM(s) IV Push every 3 hours PRN  insulin lispro (HumaLOG) corrective regimen sliding scale   SubCutaneous every 4 hours  insulin regular Infusion 5 Unit(s)/Hr IV Continuous <Continuous>  labetalol Injectable 10 milliGRAM(s) IV Push every 1 hour PRN  methylPREDNISolone sodium succinate Injectable 60 milliGRAM(s) IV Push two times a day  mycophenolate mofetil Suspension 1000 milliGRAM(s) Oral <User Schedule>  nystatin Cream 1 Application(s) Topical two times a day  pantoprazole  Injectable 40 milliGRAM(s) IV Push <User Schedule>  sodium chloride 0.9% lock flush 10 milliLiter(s) IV Push every 1 hour PRN  tacrolimus    0.5 mG/mL Suspension 4 milliGRAM(s) Oral <User Schedule>      Vital Signs Last 24 Hrs  T(C): 36.1 (2020 07:00), Max: 36.8 (2020 11:00)  T(F): 97 (2020 07:00), Max: 98.2 (2020 11:00)  HR: 69 (2020 08:35) (52 - 71)  BP: 138/71 (2020 19:00) (138/71 - 138/71)  BP(mean): 98 (2020 19:00) (98 - 98)  RR: 24 (2020 08:00) (14 - 24)  SpO2: 100% (2020 08:35) (99% - 100%)    EXAM:  Constitutional: Not in acute distress. Still intubated  Eyes: No icterus. Pupils b/l reactive to light.  Oral cavity: Clear, no lesions  Neck: No neck vein distension noted  RS: Chest clear to auscultation bilaterally. No wheeze/rhonchi/crepitations.  CVS: S1, S2 heard. Regular rate and rhythm. No murmurs/rubs/gallops.  Abdomen: Soft. Surgical scar and drains noted. No guarding/rigidity/tenderness.  : No acute abnormalities  Extremities: Warm. B/l pitting pedal edema  Skin: No lesions noted  Vascular: No evidence of phlebitis. Lt IJV TLC noted  Neuro: Alert, intubated, off sedation, responds to simple questions                        10.2   11.68 )-----------( 114      ( 2020 08:10 )             29.0       07-04    137  |  102  |  42<H>  ----------------------------<  119<H>  4.2   |  25  |  1.31<H>    Ca    9.8      2020 08:10  Phos  4.6     07-04  Mg     1.9     07-04    TPro  5.0<L>  /  Alb  3.0<L>  /  TBili  1.8<H>  /  DBili  0.8<H>  /  AST  128<H>  /  ALT  297<H>  /  AlkPhos  53  07-04      Urinalysis Basic - ( 2020 09:00 )    Color: Yellow / Appearance: Clear / S.024 / pH: x  Gluc: x / Ketone: Negative  / Bili: Negative / Urobili: Negative   Blood: x / Protein: Trace / Nitrite: Negative   Leuk Esterase: Negative / RBC: 10 /hpf / WBC 5 /HPF   Sq Epi: x / Non Sq Epi: 0 /hpf / Bacteria: Negative        MICROBIOLOGY:Culture Results:   No growth ( @ 10:11)  Culture Results:   No growth ( @ 01:05)      RADIOLOGY:  < from: Xray Chest 1 View- PORTABLE-Routine (20 @ 07:04) >  Impression: Lines and tubes described above. Small left pleural effusion and mild pulmonary edema.  < end of copied text >    < from: US Abdomen Doppler (20 @ 22:22) >  IMPRESSION:   Statuspost liver transplant. Patent hepatic artery, portal veins and hepatic veins.  An echogenic focus is seen in the IVC which may be related to the surgical anastomosis. Follow-up duplex imaging is recommended.  < end of copied text > Follow Up: Post-OLT    Interval History/ROS:Patient is a 64y old  Male who presents with a chief complaint of Liver failure, hepatic encephalopathy (2020 06:28)  - bradycardic overnight - thought to be 2/2 Dexmedetomidine  - leucocytosis noted in AM labs  - patient assessed at bedside. Plan for extubation today    Allergies    codeine (Anaphylaxis)    Intolerances        ANTIMICROBIALS:  meropenem  IVPB 1000 every 8 hours  nystatin    Suspension 863420 four times a day  trimethoprim  40 mG/sulfamethoxazole 200 mG Suspension 80 daily  valGANciclovir 50 mG/mL Oral Solution 450 daily      OTHER MEDS:  chlorhexidine 2% Cloths 1 Application(s) Topical <User Schedule>  chlorhexidine 4% Liquid 1 Application(s) Topical <User Schedule>  collagenase Ointment 1 Application(s) Topical daily  dextrose 5% + sodium chloride 0.45%. 1000 milliLiter(s) IV Continuous <Continuous>  HYDROmorphone  Injectable 0.5 milliGRAM(s) IV Push every 3 hours PRN  insulin lispro (HumaLOG) corrective regimen sliding scale   SubCutaneous every 4 hours  insulin regular Infusion 5 Unit(s)/Hr IV Continuous <Continuous>  labetalol Injectable 10 milliGRAM(s) IV Push every 1 hour PRN  methylPREDNISolone sodium succinate Injectable 60 milliGRAM(s) IV Push two times a day  mycophenolate mofetil Suspension 1000 milliGRAM(s) Oral <User Schedule>  nystatin Cream 1 Application(s) Topical two times a day  pantoprazole  Injectable 40 milliGRAM(s) IV Push <User Schedule>  sodium chloride 0.9% lock flush 10 milliLiter(s) IV Push every 1 hour PRN  tacrolimus    0.5 mG/mL Suspension 4 milliGRAM(s) Oral <User Schedule>      Vital Signs Last 24 Hrs  T(C): 36.1 (2020 07:00), Max: 36.8 (2020 11:00)  T(F): 97 (2020 07:00), Max: 98.2 (2020 11:00)  HR: 69 (2020 08:35) (52 - 71)  BP: 138/71 (2020 19:00) (138/71 - 138/71)  BP(mean): 98 (2020 19:00) (98 - 98)  RR: 24 (2020 08:00) (14 - 24)  SpO2: 100% (2020 08:35) (99% - 100%)    EXAM:  Constitutional: Not in acute distress. Still intubated  Eyes: No icterus. Pupils b/l reactive to light.  Oral cavity: Clear, no lesions  Neck: No neck vein distension noted  RS: Chest clear to auscultation bilaterally. No wheeze/rhonchi/crepitations.  CVS: S1, S2 heard. Regular rate and rhythm. No murmurs/rubs/gallops.  Abdomen: Soft. Surgical scar and drains noted. No guarding/rigidity/tenderness.  : No acute abnormalities  Extremities: Warm. B/l pitting pedal edema  Skin: RLE heel eschar noted. No active s/o inflammation  Vascular: No evidence of phlebitis. Lt IJV TLC noted  Neuro: Alert, intubated, off sedation, responds to simple questions                        10.2   11.68 )-----------( 114      ( 2020 08:10 )             29.0       07-04    137  |  102  |  42<H>  ----------------------------<  119<H>  4.2   |  25  |  1.31<H>    Ca    9.8      2020 08:10  Phos  4.6     07-04  Mg     1.9     07-04    TPro  5.0<L>  /  Alb  3.0<L>  /  TBili  1.8<H>  /  DBili  0.8<H>  /  AST  128<H>  /  ALT  297<H>  /  AlkPhos  53  07-04      Urinalysis Basic - ( 2020 09:00 )    Color: Yellow / Appearance: Clear / S.024 / pH: x  Gluc: x / Ketone: Negative  / Bili: Negative / Urobili: Negative   Blood: x / Protein: Trace / Nitrite: Negative   Leuk Esterase: Negative / RBC: 10 /hpf / WBC 5 /HPF   Sq Epi: x / Non Sq Epi: 0 /hpf / Bacteria: Negative        MICROBIOLOGY:Culture Results:   No growth ( @ 10:11)  Culture Results:   No growth ( @ 01:05)      RADIOLOGY:  < from: Xray Chest 1 View- PORTABLE-Routine (20 @ 07:04) >  Impression: Lines and tubes described above. Small left pleural effusion and mild pulmonary edema.  < end of copied text >    < from: US Abdomen Doppler (20 @ 22:22) >  IMPRESSION:   Statuspost liver transplant. Patent hepatic artery, portal veins and hepatic veins.  An echogenic focus is seen in the IVC which may be related to the surgical anastomosis. Follow-up duplex imaging is recommended.  < end of copied text >

## 2020-07-04 NOTE — OCCUPATIONAL THERAPY INITIAL EVALUATION ADULT - STRENGTHENING, PT EVAL
GOAL: Pt will improve bilateral UE/LE strength to 5/5 to increase independence in ADLs within 4 weeks
GOAL: Pt will increase BUE/BLE strength to 3+/5 to increase participation in functional tasks in 4 weeks

## 2020-07-04 NOTE — OCCUPATIONAL THERAPY INITIAL EVALUATION ADULT - BALANCE TRAINING, PT EVAL
GOAL: Pt will demonstrate improved static/dynamic balance to good in order to increase safety and independence in ADLs within 4 weeks
GOAL: Pt will increase dynamic standing balance to good (-) to increase participation in ADLs in 4 weeks

## 2020-07-04 NOTE — PROGRESS NOTE ADULT - ASSESSMENT
63 y/o M PMHx decompensated JEAN Cirrhosis on transplant list, DMII, HFpEF, recent admission for ESBL E coli prostatic abscess 2/2 4 wks ertapenem, hx of ESBL UTIs, recent VRE urinary colonization came to hospital for worsening jaundice and episode of confusion, resolved PTA.   UCx 6/14 with VRE  complicated hospital course  Now s/p OLT for worsening status  In SICU-intubated  otherwise hemodynamically stable    A) OLT  continue periop dapto and merem  per protocol  Monitor for any s/s complications/nosocomial infection  continue bactrim,valcyte for prophylaxis  Other plan per transplant team    B)Pancytopenia, --s/p Filgrastim on 6/30  -Continue to follow CBC with diff  C)R Heel Eschar  --Monitor off of antibiotics     D)GIB - s/p EGD with Acute gastritis and duodenitis with hemorrhage  --Management per GI  --Continue to follow CBC with diff      Will tailor plan for ID issues  per course,results.Will defer to primary team on management of other issues.  Assessment, plan and recommendations as detailed above were discussed with the SICU   team,Dr Menard(transplant)    Infectious Diseases Service will cover over weekend.  Please call [phone] if issues  . 63 y/o M PMHx decompensated JEAN Cirrhosis on transplant list, DMII, HFpEF, recent admission for ESBL E coli prostatic abscess 2/2 4 wks ertapenem, hx of ESBL UTIs, recent VRE urinary colonization came to hospital for worsening jaundice and episode of confusion, resolved PTA.   UCx 6/14 with VRE  complicated hospital course  Now s/p OLT for worsening status  Txf intubated to SICU post-procedure. Planned extubation 7/4.    RECOMMENDATIONS:  1. s/p OLT  - Monitor for any s/s complications/nosocomial infection  - continue bactrim,valcyte for prophylaxis  - Other plan per transplant team  --------  2. RLE heel eschar  - low clinical suspicion for infection  - d/w Txp team - continue Meropenem and Daptomycin for now  - local wound care recs per Surgery team  --------  3. Pancytopenia  - s/p Filgrastim on 6/30  - Continue to follow CBC with diff  --------  4. H/o GIB - s/p EGD with Acute gastritis and duodenitis with hemorrhage  - Management per GI  - Continue to follow CBC with diff    CHERI Bruno, MD  Fellow, Infectious Diseases  Pager: [phone]  If no response, after 5pm and on weekends: Call [phone]

## 2020-07-04 NOTE — OCCUPATIONAL THERAPY INITIAL EVALUATION ADULT - RANGE OF MOTION EXAMINATION, LOWER EXTREMITY
bilateral LE Active ROM was WNL (within normal limits)
bilateral LE Active ROM was WFL  (within functional limits)

## 2020-07-04 NOTE — OCCUPATIONAL THERAPY INITIAL EVALUATION ADULT - TRANSFER TRAINING, PT EVAL
GOAL: Pt will perform sit to stand, bed <-> chair, toilet transfers independently in 4 weeks
GOAL: Pt will complete shower transfer I with AE/DME as needed in 4 weeks

## 2020-07-04 NOTE — PROGRESS NOTE ADULT - SUBJECTIVE AND OBJECTIVE BOX
Transplant Surgery - Multidisciplinary Rounds  --------------------------------------------------------------  OLTx      Date:  7/2/2020       POD#2    Present: Patient seen with multidisciplinary team including Transplant Surgeon: Dr. Lucio.  Transplant Hepatologist Dr. Cano, hepatology fellow, Transplant Nephrologist: Dr. FAY Patel, OLY Bingham during am rounds and examined with Dr. Lucio. Disciplines not in attendance will be notified of the plan.     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     Re-admitted on 6/11:   ·	worsening HE  ·	UTI/VRE: tx with linezolid 6/15-22, bactrim DS 6/22 - 7/2  ·	MISA/Hyponatremia  ·	UGI bleed (melena) s/p EGD (6/22) c/w hemorrhagic duodenitis/gastritis; Grade 2 EV; requiring multiple transfusions  ·	Known h/o R foot ulcer (MRI neg for OM)  ·	s/p OLTx on 7/2/2020, post op liver doppler with patent vessels    OR details:  - L groin cutdown for vv bypass   - anastomosis: bicaval end-end/CBD duct-duct/HA & PV end-end, all standard anatomy.    - IOC with good flow  - Simulect induction. 500mg Solumedrol  - JPs x3 with T-Tube  - 14U pRBC, 14U FFP, 10 PLT, 11 CRYO, 500mL returned from cell saver, EBL 5L, UOP 1100mL    Recipient Info: ABO: A  CMV:  Neg  EBV Positive    Donor:  Donor ID:  [id number] Match: [id number]  Age: 29 ABO:  A1 High Risk:   No COD: Cardiovascular  Anti CMV positive, EBV IgG- positive  HepBcAb-neg, Hepatitis C-DANA- neg, Hepatitis C ab-neg    Interval events:  - POD 2 s/p OLT; good graft function  - Afebrile, stable VSS, HR 50s-70s, off nicardipine gtt since 2am  - Extubated this am  - LFTs trending down:   (169),  (325), Alk Phos 53 (72), T bili 1.8 (3), INR 1.3  - H/H stable: 10/28   - Drains: , T tube (zero), JP1 (69cc SS), ANDREINA 2 (82cc SS), ANDREINA 3 (141cc SS), UOP 1.7 L via bob.  - R foot ulcer: no purulent drainage, no erythema  - On meropenem; dapto dced today    Potential discharge date: pending clinical improvement    Education: Medication    Plan of Care: See below      MEDICATIONS  (STANDING):  chlorhexidine 2% Cloths 1 Application(s) Topical <User Schedule>  chlorhexidine 4% Liquid 1 Application(s) Topical <User Schedule>  collagenase Ointment 1 Application(s) Topical daily  dextrose 5% + sodium chloride 0.45%. 1000 milliLiter(s) (125 mL/Hr) IV Continuous <Continuous>  insulin lispro (HumaLOG) corrective regimen sliding scale   SubCutaneous every 4 hours  meropenem  IVPB 1000 milliGRAM(s) IV Intermittent every 8 hours  methylPREDNISolone sodium succinate Injectable 60 milliGRAM(s) IV Push two times a day  mycophenolate mofetil Suspension 1000 milliGRAM(s) Oral <User Schedule>  nystatin    Suspension 107864 Unit(s) Swish and Swallow four times a day  nystatin Cream 1 Application(s) Topical two times a day  pantoprazole  Injectable 40 milliGRAM(s) IV Push <User Schedule>  tacrolimus    0.5 mG/mL Suspension 4 milliGRAM(s) Oral <User Schedule>  trimethoprim  40 mG/sulfamethoxazole 200 mG Suspension 80 milliGRAM(s) Oral daily  valGANciclovir 50 mG/mL Oral Solution 450 milliGRAM(s) Oral daily    MEDICATIONS  (PRN):  HYDROmorphone  Injectable 0.5 milliGRAM(s) IV Push every 3 hours PRN Moderate Pain (4 - 6)  labetalol Injectable 10 milliGRAM(s) IV Push every 1 hour PRN systolic blood pressure GREATER THAN 170 mmHg or diastolic blood pressure GREATER THAN 100 mmHg  sodium chloride 0.9% lock flush 10 milliLiter(s) IV Push every 1 hour PRN Pre/post blood products, medications, blood draw, and to maintain line patency      PAST MEDICAL & SURGICAL HISTORY:  GIB (gastrointestinal bleeding)  GERD with esophagitis: Gastritis &amp; Non Bleeding Ulcers  Hepatic encephalopathy  Obesity  Fatty liver disease, nonalcoholic  Renal stones: 25 years ago  Hypertension  Neuropathy  Hypercholesteremia  Diabetes  S/P cholecystectomy    Vital Signs Last 24 Hrs  T(C): 36.2 (04 Jul 2020 11:00), Max: 36.8 (03 Jul 2020 15:00)  T(F): 97.2 (04 Jul 2020 11:00), Max: 98.2 (03 Jul 2020 15:00)  HR: 69 (04 Jul 2020 11:00) (52 - 69)  BP: 138/71 (03 Jul 2020 19:00) (138/71 - 138/71)  BP(mean): 98 (03 Jul 2020 19:00) (98 - 98)  RR: 13 (04 Jul 2020 11:00) (8 - 24)  SpO2: 97% (04 Jul 2020 11:00) (97% - 100%)    I&O's Summary    03 Jul 2020 07:01  -  04 Jul 2020 07:00  --------------------------------------------------------  IN: 4268.8 mL / OUT: 2452 mL / NET: 1816.8 mL    04 Jul 2020 07:01  -  04 Jul 2020 12:37  --------------------------------------------------------  IN: 593 mL / OUT: 407 mL / NET: 186 mL                         10.2   11.68 )-----------( 114      ( 04 Jul 2020 08:10 )             29.0     07-04    137  |  102  |  42<H>  ----------------------------<  119<H>  4.2   |  25  |  1.31<H>    Ca    9.8      04 Jul 2020 08:10  Phos  4.6     07-04  Mg     1.9     07-04    TPro  5.0<L>  /  Alb  3.0<L>  /  TBili  1.8<H>  /  DBili  0.8<H>  /  AST  128<H>  /  ALT  297<H>  /  AlkPhos  53  07-04    Tacrolimus (), Serum: 5.3 ng/mL (07-04 @ 08:55)    Culture - Blood (collected 07-03-20 @ 11:00)  Source: .Blood Blood  Preliminary Report (07-04-20 @ 12:01):    No growth to date.    Culture - Blood (collected 07-03-20 @ 11:00)  Source: .Blood Blood  Preliminary Report (07-04-20 @ 12:01):    No growth to date.    Culture - Urine (collected 07-03-20 @ 10:11)  Source: .Urine Catheterized  Final Report (07-04-20 @ 07:24):    No growth    Culture - Surgical Swab (collected 07-03-20 @ 01:05)  Source: .Surgical Swab abdominal ascites culture  Preliminary Report (07-03-20 @ 18:12):    No growth      Review of systems  Gen: fatigue/weakness  Skin: no rashes  Head/Eyes/Ears/Mouth: no headache, no changes in vision  Respiratory: no SOB  CV: no chest pain  GI: as in HPI  : bob  MSK: + LE edema  Neuro:  no seizures  Heme: no easy bruising  Endo: no heat/cold intolerance  Psych: no significant nervousness, anxiety, stress  All other systems were reviewed and are negative, except as noted     PHYSICAL EXAM:  Constitutional: Well developed / well nourished  Eyes: mild icterus  ENMT: NC/AT  Neck: supple  Respiratory: CTA b/l  Cardiovascular: RRR  Gastrointestinal: Soft abdomen, ND, no incisional pain through sedation. chevron incision, staples in place. No signs of infection. T-tube w/ no drainage.  JPs x3--all ss. Minimal SS leakage around the ANDREINA sites.   Genitourinary: Urinary catheter in place with good UO  Extremities: SCD's in place and working bilaterally. L groin cutdown site c/d/i. R a-line c/d/i.  Vascular: Palpable dp pulses bilaterally.   Neurological: intubated, sedated  Skin: no rashes, ulcerations, lesions post-op  Psychiatric: awake, alert and oriented

## 2020-07-04 NOTE — OCCUPATIONAL THERAPY INITIAL EVALUATION ADULT - BALANCE DISTURBANCE, IDENTIFIED IMPAIRMENT CONTRIBUTE, REHAB EVAL
impaired postural control/decreased ROM/impaired motor control/decreased strength
decreased strength/decreased ROM

## 2020-07-05 LAB
ALBUMIN SERPL ELPH-MCNC: 3.1 G/DL — LOW (ref 3.3–5)
ALBUMIN SERPL ELPH-MCNC: 3.2 G/DL — LOW (ref 3.3–5)
ALBUMIN SERPL ELPH-MCNC: 3.3 G/DL — SIGNIFICANT CHANGE UP (ref 3.3–5)
ALP SERPL-CCNC: 57 U/L — SIGNIFICANT CHANGE UP (ref 40–120)
ALP SERPL-CCNC: 62 U/L — SIGNIFICANT CHANGE UP (ref 40–120)
ALP SERPL-CCNC: 62 U/L — SIGNIFICANT CHANGE UP (ref 40–120)
ALT FLD-CCNC: 193 U/L — HIGH (ref 10–45)
ALT FLD-CCNC: 214 U/L — HIGH (ref 10–45)
ALT FLD-CCNC: 217 U/L — HIGH (ref 10–45)
AMMONIA BLD-MCNC: 27 UMOL/L — SIGNIFICANT CHANGE UP (ref 11–55)
ANION GAP SERPL CALC-SCNC: 11 MMOL/L — SIGNIFICANT CHANGE UP (ref 5–17)
ANION GAP SERPL CALC-SCNC: 8 MMOL/L — SIGNIFICANT CHANGE UP (ref 5–17)
ANION GAP SERPL CALC-SCNC: 9 MMOL/L — SIGNIFICANT CHANGE UP (ref 5–17)
APTT BLD: 23.2 SEC — LOW (ref 27.5–35.5)
APTT BLD: 24.3 SEC — LOW (ref 27.5–35.5)
APTT BLD: 24.8 SEC — LOW (ref 27.5–35.5)
AST SERPL-CCNC: 59 U/L — HIGH (ref 10–40)
AST SERPL-CCNC: 66 U/L — HIGH (ref 10–40)
AST SERPL-CCNC: 67 U/L — HIGH (ref 10–40)
BASOPHILS # BLD AUTO: 0.02 K/UL — SIGNIFICANT CHANGE UP (ref 0–0.2)
BASOPHILS NFR BLD AUTO: 0.2 % — SIGNIFICANT CHANGE UP (ref 0–2)
BILIRUB DIRECT SERPL-MCNC: 0.9 MG/DL — HIGH (ref 0–0.2)
BILIRUB DIRECT SERPL-MCNC: 0.9 MG/DL — HIGH (ref 0–0.2)
BILIRUB DIRECT SERPL-MCNC: 1.1 MG/DL — HIGH (ref 0–0.2)
BILIRUB INDIRECT FLD-MCNC: 0.9 MG/DL — SIGNIFICANT CHANGE UP (ref 0.2–1)
BILIRUB INDIRECT FLD-MCNC: 1.1 MG/DL — HIGH (ref 0.2–1)
BILIRUB INDIRECT FLD-MCNC: 1.1 MG/DL — HIGH (ref 0.2–1)
BILIRUB SERPL-MCNC: 1.8 MG/DL — HIGH (ref 0.2–1.2)
BILIRUB SERPL-MCNC: 2 MG/DL — HIGH (ref 0.2–1.2)
BILIRUB SERPL-MCNC: 2.2 MG/DL — HIGH (ref 0.2–1.2)
BUN SERPL-MCNC: 46 MG/DL — HIGH (ref 7–23)
CALCIUM SERPL-MCNC: 9.6 MG/DL — SIGNIFICANT CHANGE UP (ref 8.4–10.5)
CALCIUM SERPL-MCNC: 9.6 MG/DL — SIGNIFICANT CHANGE UP (ref 8.4–10.5)
CALCIUM SERPL-MCNC: 9.7 MG/DL — SIGNIFICANT CHANGE UP (ref 8.4–10.5)
CHLORIDE SERPL-SCNC: 98 MMOL/L — SIGNIFICANT CHANGE UP (ref 96–108)
CHLORIDE SERPL-SCNC: 99 MMOL/L — SIGNIFICANT CHANGE UP (ref 96–108)
CHLORIDE SERPL-SCNC: 99 MMOL/L — SIGNIFICANT CHANGE UP (ref 96–108)
CO2 SERPL-SCNC: 22 MMOL/L — SIGNIFICANT CHANGE UP (ref 22–31)
CO2 SERPL-SCNC: 26 MMOL/L — SIGNIFICANT CHANGE UP (ref 22–31)
CO2 SERPL-SCNC: 26 MMOL/L — SIGNIFICANT CHANGE UP (ref 22–31)
CREAT SERPL-MCNC: 1.12 MG/DL — SIGNIFICANT CHANGE UP (ref 0.5–1.3)
CREAT SERPL-MCNC: 1.15 MG/DL — SIGNIFICANT CHANGE UP (ref 0.5–1.3)
CREAT SERPL-MCNC: 1.25 MG/DL — SIGNIFICANT CHANGE UP (ref 0.5–1.3)
EOSINOPHIL # BLD AUTO: 0 K/UL — SIGNIFICANT CHANGE UP (ref 0–0.5)
EOSINOPHIL NFR BLD AUTO: 0 % — SIGNIFICANT CHANGE UP (ref 0–6)
FIBRINOGEN PPP-MCNC: 205 MG/DL — LOW (ref 350–510)
FIBRINOGEN PPP-MCNC: 222 MG/DL — LOW (ref 350–510)
FIBRINOGEN PPP-MCNC: 224 MG/DL — LOW (ref 350–510)
GLUCOSE BLDC GLUCOMTR-MCNC: 119 MG/DL — HIGH (ref 70–99)
GLUCOSE BLDC GLUCOMTR-MCNC: 124 MG/DL — HIGH (ref 70–99)
GLUCOSE BLDC GLUCOMTR-MCNC: 137 MG/DL — HIGH (ref 70–99)
GLUCOSE BLDC GLUCOMTR-MCNC: 137 MG/DL — HIGH (ref 70–99)
GLUCOSE BLDC GLUCOMTR-MCNC: 142 MG/DL — HIGH (ref 70–99)
GLUCOSE BLDC GLUCOMTR-MCNC: 144 MG/DL — HIGH (ref 70–99)
GLUCOSE BLDC GLUCOMTR-MCNC: 150 MG/DL — HIGH (ref 70–99)
GLUCOSE BLDC GLUCOMTR-MCNC: 154 MG/DL — HIGH (ref 70–99)
GLUCOSE BLDC GLUCOMTR-MCNC: 159 MG/DL — HIGH (ref 70–99)
GLUCOSE BLDC GLUCOMTR-MCNC: 159 MG/DL — HIGH (ref 70–99)
GLUCOSE BLDC GLUCOMTR-MCNC: 164 MG/DL — HIGH (ref 70–99)
GLUCOSE BLDC GLUCOMTR-MCNC: 166 MG/DL — HIGH (ref 70–99)
GLUCOSE BLDC GLUCOMTR-MCNC: 167 MG/DL — HIGH (ref 70–99)
GLUCOSE BLDC GLUCOMTR-MCNC: 170 MG/DL — HIGH (ref 70–99)
GLUCOSE BLDC GLUCOMTR-MCNC: 269 MG/DL — HIGH (ref 70–99)
GLUCOSE BLDC GLUCOMTR-MCNC: 281 MG/DL — HIGH (ref 70–99)
GLUCOSE BLDC GLUCOMTR-MCNC: 284 MG/DL — HIGH (ref 70–99)
GLUCOSE SERPL-MCNC: 144 MG/DL — HIGH (ref 70–99)
GLUCOSE SERPL-MCNC: 157 MG/DL — HIGH (ref 70–99)
GLUCOSE SERPL-MCNC: 169 MG/DL — HIGH (ref 70–99)
HCT VFR BLD CALC: 27.1 % — LOW (ref 39–50)
HCT VFR BLD CALC: 28 % — LOW (ref 39–50)
HCT VFR BLD CALC: 28.4 % — LOW (ref 39–50)
HGB BLD-MCNC: 9.2 G/DL — LOW (ref 13–17)
HGB BLD-MCNC: 9.7 G/DL — LOW (ref 13–17)
HGB BLD-MCNC: 9.8 G/DL — LOW (ref 13–17)
IMM GRANULOCYTES NFR BLD AUTO: 2 % — HIGH (ref 0–1.5)
INR BLD: 1.22 RATIO — HIGH (ref 0.88–1.16)
INR BLD: 1.26 RATIO — HIGH (ref 0.88–1.16)
INR BLD: 1.29 RATIO — HIGH (ref 0.88–1.16)
LYMPHOCYTES # BLD AUTO: 0.19 K/UL — LOW (ref 1–3.3)
LYMPHOCYTES # BLD AUTO: 1.8 % — LOW (ref 13–44)
MAGNESIUM SERPL-MCNC: 1.8 MG/DL — SIGNIFICANT CHANGE UP (ref 1.6–2.6)
MAGNESIUM SERPL-MCNC: 2 MG/DL — SIGNIFICANT CHANGE UP (ref 1.6–2.6)
MAGNESIUM SERPL-MCNC: 2 MG/DL — SIGNIFICANT CHANGE UP (ref 1.6–2.6)
MCHC RBC-ENTMCNC: 30.5 PG — SIGNIFICANT CHANGE UP (ref 27–34)
MCHC RBC-ENTMCNC: 31.3 PG — SIGNIFICANT CHANGE UP (ref 27–34)
MCHC RBC-ENTMCNC: 31.3 PG — SIGNIFICANT CHANGE UP (ref 27–34)
MCHC RBC-ENTMCNC: 33.9 GM/DL — SIGNIFICANT CHANGE UP (ref 32–36)
MCHC RBC-ENTMCNC: 34.2 GM/DL — SIGNIFICANT CHANGE UP (ref 32–36)
MCHC RBC-ENTMCNC: 35 GM/DL — SIGNIFICANT CHANGE UP (ref 32–36)
MCV RBC AUTO: 89.5 FL — SIGNIFICANT CHANGE UP (ref 80–100)
MCV RBC AUTO: 89.7 FL — SIGNIFICANT CHANGE UP (ref 80–100)
MCV RBC AUTO: 91.6 FL — SIGNIFICANT CHANGE UP (ref 80–100)
MONOCYTES # BLD AUTO: 0.58 K/UL — SIGNIFICANT CHANGE UP (ref 0–0.9)
MONOCYTES NFR BLD AUTO: 5.4 % — SIGNIFICANT CHANGE UP (ref 2–14)
NEUTROPHILS # BLD AUTO: 9.8 K/UL — HIGH (ref 1.8–7.4)
NEUTROPHILS NFR BLD AUTO: 90.6 % — HIGH (ref 43–77)
NRBC # BLD: 0 /100 WBCS — SIGNIFICANT CHANGE UP (ref 0–0)
PHOSPHATE SERPL-MCNC: 3.3 MG/DL — SIGNIFICANT CHANGE UP (ref 2.5–4.5)
PHOSPHATE SERPL-MCNC: 3.6 MG/DL — SIGNIFICANT CHANGE UP (ref 2.5–4.5)
PHOSPHATE SERPL-MCNC: 4.2 MG/DL — SIGNIFICANT CHANGE UP (ref 2.5–4.5)
PLATELET # BLD AUTO: 104 K/UL — LOW (ref 150–400)
PLATELET # BLD AUTO: 105 K/UL — LOW (ref 150–400)
PLATELET # BLD AUTO: 99 K/UL — LOW (ref 150–400)
POTASSIUM SERPL-MCNC: 3.9 MMOL/L — SIGNIFICANT CHANGE UP (ref 3.5–5.3)
POTASSIUM SERPL-MCNC: 4.1 MMOL/L — SIGNIFICANT CHANGE UP (ref 3.5–5.3)
POTASSIUM SERPL-MCNC: 4.3 MMOL/L — SIGNIFICANT CHANGE UP (ref 3.5–5.3)
POTASSIUM SERPL-SCNC: 3.9 MMOL/L — SIGNIFICANT CHANGE UP (ref 3.5–5.3)
POTASSIUM SERPL-SCNC: 4.1 MMOL/L — SIGNIFICANT CHANGE UP (ref 3.5–5.3)
POTASSIUM SERPL-SCNC: 4.3 MMOL/L — SIGNIFICANT CHANGE UP (ref 3.5–5.3)
PROT SERPL-MCNC: 5.1 G/DL — LOW (ref 6–8.3)
PROT SERPL-MCNC: 5.4 G/DL — LOW (ref 6–8.3)
PROT SERPL-MCNC: 5.6 G/DL — LOW (ref 6–8.3)
PROTHROM AB SERPL-ACNC: 13.9 SEC — HIGH (ref 10.6–13.6)
PROTHROM AB SERPL-ACNC: 14.3 SEC — HIGH (ref 10.6–13.6)
PROTHROM AB SERPL-ACNC: 14.7 SEC — HIGH (ref 10.6–13.6)
RBC # BLD: 3.02 M/UL — LOW (ref 4.2–5.8)
RBC # BLD: 3.1 M/UL — LOW (ref 4.2–5.8)
RBC # BLD: 3.13 M/UL — LOW (ref 4.2–5.8)
RBC # FLD: 15.7 % — HIGH (ref 10.3–14.5)
RBC # FLD: 15.8 % — HIGH (ref 10.3–14.5)
RBC # FLD: 15.8 % — HIGH (ref 10.3–14.5)
SODIUM SERPL-SCNC: 132 MMOL/L — LOW (ref 135–145)
SODIUM SERPL-SCNC: 133 MMOL/L — LOW (ref 135–145)
SODIUM SERPL-SCNC: 133 MMOL/L — LOW (ref 135–145)
TACROLIMUS SERPL-MCNC: 7.3 NG/ML — SIGNIFICANT CHANGE UP
WBC # BLD: 10.81 K/UL — HIGH (ref 3.8–10.5)
WBC # BLD: 11.07 K/UL — HIGH (ref 3.8–10.5)
WBC # BLD: 11.95 K/UL — HIGH (ref 3.8–10.5)
WBC # FLD AUTO: 10.81 K/UL — HIGH (ref 3.8–10.5)
WBC # FLD AUTO: 11.07 K/UL — HIGH (ref 3.8–10.5)
WBC # FLD AUTO: 11.95 K/UL — HIGH (ref 3.8–10.5)

## 2020-07-05 PROCEDURE — 99232 SBSQ HOSP IP/OBS MODERATE 35: CPT | Mod: GC

## 2020-07-05 PROCEDURE — 99233 SBSQ HOSP IP/OBS HIGH 50: CPT | Mod: GC

## 2020-07-05 PROCEDURE — 99233 SBSQ HOSP IP/OBS HIGH 50: CPT

## 2020-07-05 PROCEDURE — 93010 ELECTROCARDIOGRAM REPORT: CPT

## 2020-07-05 RX ORDER — VALGANCICLOVIR 450 MG/1
450 TABLET, FILM COATED ORAL DAILY
Refills: 0 | Status: DISCONTINUED | OUTPATIENT
Start: 2020-07-05 | End: 2020-07-05

## 2020-07-05 RX ORDER — VALGANCICLOVIR 450 MG/1
450 TABLET, FILM COATED ORAL DAILY
Refills: 0 | Status: DISCONTINUED | OUTPATIENT
Start: 2020-07-05 | End: 2020-07-16

## 2020-07-05 RX ORDER — INSULIN HUMAN 100 [IU]/ML
2 INJECTION, SOLUTION SUBCUTANEOUS
Qty: 100 | Refills: 0 | Status: DISCONTINUED | OUTPATIENT
Start: 2020-07-05 | End: 2020-07-06

## 2020-07-05 RX ORDER — NIFEDIPINE 30 MG
30 TABLET, EXTENDED RELEASE 24 HR ORAL DAILY
Refills: 0 | Status: DISCONTINUED | OUTPATIENT
Start: 2020-07-05 | End: 2020-07-05

## 2020-07-05 RX ORDER — MYCOPHENOLATE MOFETIL 250 MG/1
1000 CAPSULE ORAL
Refills: 0 | Status: DISCONTINUED | OUTPATIENT
Start: 2020-07-05 | End: 2020-07-09

## 2020-07-05 RX ORDER — INSULIN LISPRO 100/ML
VIAL (ML) SUBCUTANEOUS AT BEDTIME
Refills: 0 | Status: DISCONTINUED | OUTPATIENT
Start: 2020-07-05 | End: 2020-07-06

## 2020-07-05 RX ORDER — INSULIN GLARGINE 100 [IU]/ML
15 INJECTION, SOLUTION SUBCUTANEOUS AT BEDTIME
Refills: 0 | Status: DISCONTINUED | OUTPATIENT
Start: 2020-07-06 | End: 2020-07-06

## 2020-07-05 RX ORDER — MAGNESIUM SULFATE 500 MG/ML
1 VIAL (ML) INJECTION ONCE
Refills: 0 | Status: COMPLETED | OUTPATIENT
Start: 2020-07-05 | End: 2020-07-05

## 2020-07-05 RX ORDER — HYDROMORPHONE HYDROCHLORIDE 2 MG/ML
0.5 INJECTION INTRAMUSCULAR; INTRAVENOUS; SUBCUTANEOUS
Refills: 0 | Status: DISCONTINUED | OUTPATIENT
Start: 2020-07-05 | End: 2020-07-07

## 2020-07-05 RX ORDER — INSULIN GLARGINE 100 [IU]/ML
5 INJECTION, SOLUTION SUBCUTANEOUS AT BEDTIME
Refills: 0 | Status: COMPLETED | OUTPATIENT
Start: 2020-07-05 | End: 2020-07-05

## 2020-07-05 RX ORDER — FUROSEMIDE 40 MG
40 TABLET ORAL ONCE
Refills: 0 | Status: COMPLETED | OUTPATIENT
Start: 2020-07-05 | End: 2020-07-05

## 2020-07-05 RX ORDER — NIFEDIPINE 30 MG
30 TABLET, EXTENDED RELEASE 24 HR ORAL ONCE
Refills: 0 | Status: COMPLETED | OUTPATIENT
Start: 2020-07-05 | End: 2020-07-05

## 2020-07-05 RX ORDER — INSULIN LISPRO 100/ML
VIAL (ML) SUBCUTANEOUS
Refills: 0 | Status: DISCONTINUED | OUTPATIENT
Start: 2020-07-05 | End: 2020-07-06

## 2020-07-05 RX ORDER — PANTOPRAZOLE SODIUM 20 MG/1
40 TABLET, DELAYED RELEASE ORAL EVERY 12 HOURS
Refills: 0 | Status: DISCONTINUED | OUTPATIENT
Start: 2020-07-05 | End: 2020-07-14

## 2020-07-05 RX ORDER — POTASSIUM CHLORIDE 20 MEQ
10 PACKET (EA) ORAL ONCE
Refills: 0 | Status: COMPLETED | OUTPATIENT
Start: 2020-07-05 | End: 2020-07-05

## 2020-07-05 RX ORDER — MYCOPHENOLATE MOFETIL 250 MG/1
1000 CAPSULE ORAL
Refills: 0 | Status: DISCONTINUED | OUTPATIENT
Start: 2020-07-05 | End: 2020-07-05

## 2020-07-05 RX ORDER — NIFEDIPINE 30 MG
60 TABLET, EXTENDED RELEASE 24 HR ORAL DAILY
Refills: 0 | Status: DISCONTINUED | OUTPATIENT
Start: 2020-07-05 | End: 2020-07-06

## 2020-07-05 RX ORDER — TACROLIMUS 5 MG/1
4 CAPSULE ORAL
Refills: 0 | Status: DISCONTINUED | OUTPATIENT
Start: 2020-07-05 | End: 2020-07-05

## 2020-07-05 RX ORDER — INSULIN GLARGINE 100 [IU]/ML
10 INJECTION, SOLUTION SUBCUTANEOUS ONCE
Refills: 0 | Status: COMPLETED | OUTPATIENT
Start: 2020-07-05 | End: 2020-07-05

## 2020-07-05 RX ADMIN — MEROPENEM 100 MILLIGRAM(S): 1 INJECTION INTRAVENOUS at 21:00

## 2020-07-05 RX ADMIN — CHLORHEXIDINE GLUCONATE 1 APPLICATION(S): 213 SOLUTION TOPICAL at 05:28

## 2020-07-05 RX ADMIN — Medication 100 GRAM(S): at 15:41

## 2020-07-05 RX ADMIN — MEROPENEM 100 MILLIGRAM(S): 1 INJECTION INTRAVENOUS at 05:30

## 2020-07-05 RX ADMIN — Medication 100000 UNIT(S): at 05:30

## 2020-07-05 RX ADMIN — NYSTATIN CREAM 1 APPLICATION(S): 100000 CREAM TOPICAL at 05:31

## 2020-07-05 RX ADMIN — PANTOPRAZOLE SODIUM 40 MILLIGRAM(S): 20 TABLET, DELAYED RELEASE ORAL at 05:30

## 2020-07-05 RX ADMIN — Medication 100000 UNIT(S): at 11:50

## 2020-07-05 RX ADMIN — TACROLIMUS 4 MILLIGRAM(S): 5 CAPSULE ORAL at 07:19

## 2020-07-05 RX ADMIN — Medication 100000 UNIT(S): at 23:56

## 2020-07-05 RX ADMIN — Medication 30 MILLIGRAM(S): at 11:50

## 2020-07-05 RX ADMIN — INSULIN HUMAN 2 UNIT(S)/HR: 100 INJECTION, SOLUTION SUBCUTANEOUS at 22:05

## 2020-07-05 RX ADMIN — Medication 30 MILLIGRAM(S): at 05:30

## 2020-07-05 RX ADMIN — SODIUM CHLORIDE 75 MILLILITER(S): 9 INJECTION, SOLUTION INTRAVENOUS at 05:31

## 2020-07-05 RX ADMIN — MYCOPHENOLATE MOFETIL 1000 MILLIGRAM(S): 250 CAPSULE ORAL at 21:01

## 2020-07-05 RX ADMIN — OXYCODONE HYDROCHLORIDE 5 MILLIGRAM(S): 5 TABLET ORAL at 21:01

## 2020-07-05 RX ADMIN — Medication 100 MILLIEQUIVALENT(S): at 03:31

## 2020-07-05 RX ADMIN — HYDROMORPHONE HYDROCHLORIDE 0.5 MILLIGRAM(S): 2 INJECTION INTRAMUSCULAR; INTRAVENOUS; SUBCUTANEOUS at 21:53

## 2020-07-05 RX ADMIN — INSULIN HUMAN 2 UNIT(S)/HR: 100 INJECTION, SOLUTION SUBCUTANEOUS at 05:31

## 2020-07-05 RX ADMIN — AMLODIPINE BESYLATE 5 MILLIGRAM(S): 2.5 TABLET ORAL at 06:18

## 2020-07-05 RX ADMIN — Medication 2: at 21:01

## 2020-07-05 RX ADMIN — Medication 1 APPLICATION(S): at 09:51

## 2020-07-05 RX ADMIN — PANTOPRAZOLE SODIUM 40 MILLIGRAM(S): 20 TABLET, DELAYED RELEASE ORAL at 17:28

## 2020-07-05 RX ADMIN — HYDROMORPHONE HYDROCHLORIDE 0.5 MILLIGRAM(S): 2 INJECTION INTRAMUSCULAR; INTRAVENOUS; SUBCUTANEOUS at 13:24

## 2020-07-05 RX ADMIN — Medication 3: at 17:37

## 2020-07-05 RX ADMIN — TAMSULOSIN HYDROCHLORIDE 0.4 MILLIGRAM(S): 0.4 CAPSULE ORAL at 21:01

## 2020-07-05 RX ADMIN — INSULIN GLARGINE 10 UNIT(S): 100 INJECTION, SOLUTION SUBCUTANEOUS at 11:55

## 2020-07-05 RX ADMIN — VALGANCICLOVIR 450 MILLIGRAM(S): 450 TABLET, FILM COATED ORAL at 11:50

## 2020-07-05 RX ADMIN — Medication 1 TABLET(S): at 11:50

## 2020-07-05 RX ADMIN — Medication 40 MILLIGRAM(S): at 12:47

## 2020-07-05 RX ADMIN — Medication 100000 UNIT(S): at 17:28

## 2020-07-05 RX ADMIN — Medication 30 MILLIGRAM(S): at 17:28

## 2020-07-05 RX ADMIN — Medication 30 MILLIGRAM(S): at 14:13

## 2020-07-05 RX ADMIN — INSULIN GLARGINE 5 UNIT(S): 100 INJECTION, SOLUTION SUBCUTANEOUS at 21:01

## 2020-07-05 RX ADMIN — MYCOPHENOLATE MOFETIL 1000 MILLIGRAM(S): 250 CAPSULE ORAL at 07:19

## 2020-07-05 RX ADMIN — MEROPENEM 100 MILLIGRAM(S): 1 INJECTION INTRAVENOUS at 13:01

## 2020-07-05 RX ADMIN — NYSTATIN CREAM 1 APPLICATION(S): 100000 CREAM TOPICAL at 17:29

## 2020-07-05 NOTE — PROGRESS NOTE ADULT - SUBJECTIVE AND OBJECTIVE BOX
HISTORY  64y Male c/b small esophageal varices (12/2019), portal hypertensive gastropathy, ascites, hx SBP, hx hepatic encephalopathy, GAVE syndrome s/p APC, hx duodenal ulcer (5/2019), HTN, HLD, DM, HFpEF (EF 70%), recent ESBL E coli prostate abscess (3/2020 s/p 4 weeks ertapenem), orthostatic hypotension ( on midodrine) who was initially admitted to medicine for hepatic encephalopathy. Hospital course c/b MISA, VRE UTI, acute blood loss anemia, received blood transfusion, EGD done 6/22 with Grade II esophageal varices, acute gastritis with hemorrhage and acute duodenitis with hemorrhage. Patient had worsening mental status, concerning decompensating liver cirrhosis, hepatic encephalopathy patient then transferred to SICU for further care.        24 HOUR EVENTS:  - On/off cardene gtt; now on cardeene   -CPAP extubated  - NGT discontinued  - IVF decreased to 75ml/hr  - PLaced on CLD  - Started home PO meds   - Remains on meropenem for foot ulcer     SUBJECTIVE/ROS:  [ ] A ten-point review of systems was otherwise negative except as noted.  [ ] Due to altered mental status/intubation, subjective information were not able to be obtained from the patient. History was obtained, to the extent possible, from review of the chart and collateral sources of information.      NEURO  Exam: awake, alert, oriented  Meds: HYDROmorphone  Injectable 0.5 milliGRAM(s) IV Push every 3 hours PRN Moderate Pain (4 - 6)  oxyCODONE    IR 5 milliGRAM(s) Oral every 4 hours PRN Moderate Pain (4 - 6)  oxyCODONE    IR 10 milliGRAM(s) Oral every 4 hours PRN Severe Pain (7 - 10)    [x] Adequacy of sedation and pain control has been assessed and adjusted      RESPIRATORY  RR: 17 (07-05-20 @ 00:15) (8 - 28)  SpO2: 97% (07-05-20 @ 00:15) (93% - 100%)  Exam: unlabored, clear to auscultation bilaterally  Mechanical Ventilation: Mode: CPAP with PS, RR (patient): 22, FiO2: 40, PEEP: 5, PS: 5, MAP: 10  ABG - ( 04 Jul 2020 08:45 )  pH: 7.39  /  pCO2: 49    /  pO2: 157   / HCO3: 29    / Base Excess: 3.7   /  SaO2: 99      Lactate: x      [N/A] Extubation Readiness Assessed  Meds: none      CARDIOVASCULAR  HR: 84 (07-05-20 @ 00:15) (52 - 90)  BP: 154/72 (07-04-20 @ 19:15) (154/72 - 154/72)  BP(mean): 103 (07-04-20 @ 19:15) (103 - 103)  ABP: 123/46 (07-05-20 @ 00:15) (123/46 - 163/58)  ABP(mean): 72 (07-05-20 @ 00:15) (71 - 96)  Exam: regular rate and rhythm  Cardiac Rhythm: sinus  Perfusion     [x]Adequate   [ ]Inadequate  Mentation   [x]Normal       [ ]Reduced  Extremities  [x]Warm         [ ]Cool  Volume Status [ ]Hypervolemic [x]Euvolemic [ ]Hypovolemic  Meds: amLODIPine   Tablet 5 milliGRAM(s) Oral daily  labetalol Injectable 10 milliGRAM(s) IV Push every 1 hour PRN systolic blood pressure GREATER THAN 170 mmHg or diastolic blood pressure GREATER THAN 100 mmHg  niCARdipine Infusion 5 mG/Hr IV Continuous <Continuous>  tamsulosin 0.4 milliGRAM(s) Oral at bedtime        GI/NUTRITION  Exam: soft, nontender, nondistended  Diet: CLD   Meds: pantoprazole  Injectable 40 milliGRAM(s) IV Push <User Schedule>      GENITOURINARY  I&O's Detail    07-03 @ 07:01  -  07-04 @ 07:00  --------------------------------------------------------  IN:    Albumin 5%  - 250 mL: 250 mL    dexmedetomidine Infusion: 262.8 mL    dextrose 5% + sodium chloride 0.45%.: 3000 mL    Enteral Tube Flush: 280 mL    insulin regular Infusion: 97 mL    niCARdipine Infusion: 75 mL    octreotide  Infusion: 4 mL    Solution: 300 mL  Total IN: 4268.8 mL    OUT:    Drain: 82 mL    Drain: 141 mL    Drain: 69 mL    Indwelling Catheter - Urethral: 1710 mL    Nasoenteral Tube: 450 mL  Total OUT: 2452 mL    Total NET: 1816.8 mL      07-04 @ 07:01  -  07-05 @ 00:26  --------------------------------------------------------  IN:    dextrose 5% + sodium chloride 0.45%.: 750 mL    dextrose 5% + sodium chloride 0.45%.: 825 mL    Enteral Tube Flush: 190 mL    insulin regular Infusion: 8.5 mL    insulin regular Infusion: 3 mL    niCARdipine Infusion: 37.5 mL    Oral Fluid: 1892 mL    Solution: 150 mL  Total IN: 3856 mL    OUT:    Drain: 41 mL    Drain: 39 mL    Drain: 37 mL    Indwelling Catheter - Urethral: 610 mL    Nasoenteral Tube: 80 mL    Voided: 300 mL  Total OUT: 1107 mL    Total NET: 2749 mL          07-04    135  |  101  |  47<H>  ----------------------------<  202<H>  4.1   |  23  |  1.28    Ca    9.8      04 Jul 2020 19:49  Phos  4.1     07-04  Mg     2.0     07-04    TPro  5.1<L>  /  Alb  3.2<L>  /  TBili  1.8<H>  /  DBili  0.8<H>  /  AST  76<H>  /  ALT  243<H>  /  AlkPhos  57  07-04    [ ] Beasley catheter, indication: N/A  Meds: dextrose 5% + sodium chloride 0.45%. 1000 milliLiter(s) IV Continuous <Continuous>  sodium chloride 0.9% lock flush 10 milliLiter(s) IV Push every 1 hour PRN Pre/post blood products, medications, blood draw, and to maintain line patency        HEMATOLOGIC  Meds: none  [x] VTE Prophylaxis                        10.0   14.30 )-----------( 113      ( 04 Jul 2020 19:49 )             28.9     PT/INR - ( 04 Jul 2020 19:49 )   PT: 14.4 sec;   INR: 1.27 ratio         PTT - ( 04 Jul 2020 19:49 )  PTT:24.0 sec  Transfusion     [ ] PRBC   [ ] Platelets   [ ] FFP   [ ] Cryoprecipitate      INFECTIOUS DISEASES  WBC Count: 14.30 K/uL (07-04 @ 19:49)  WBC Count: 13.49 K/uL (07-04 @ 14:23)  WBC Count: 11.68 K/uL (07-04 @ 08:10)  WBC Count: 11.09 K/uL (07-04 @ 02:07)    RECENT CULTURES:  Specimen Source: .Blood Blood  Date/Time: 07-03 @ 11:00  Culture Results:   No growth to date.  Gram Stain: --  Organism: --  Specimen Source: .Urine Catheterized  Date/Time: 07-03 @ 10:11  Culture Results:   No growth  Gram Stain: --  Organism: --  Specimen Source: .Surgical Swab abdominal ascites culture  Date/Time: 07-03 @ 01:05  Culture Results:   No growth  Gram Stain: --  Organism: --    Meds: meropenem  IVPB 1000 milliGRAM(s) IV Intermittent every 8 hours  mycophenolate mofetil Suspension 1000 milliGRAM(s) Oral <User Schedule>  nystatin    Suspension 218925 Unit(s) Swish and Swallow four times a day  tacrolimus    0.5 mG/mL Suspension 4 milliGRAM(s) Oral <User Schedule>  trimethoprim  40 mG/sulfamethoxazole 200 mG Suspension 80 milliGRAM(s) Oral daily  valGANciclovir 50 mG/mL Oral Solution 450 milliGRAM(s) Oral daily        ENDOCRINE  CAPILLARY BLOOD GLUCOSE      POCT Blood Glucose.: 168 mg/dL (04 Jul 2020 22:57)  POCT Blood Glucose.: 166 mg/dL (04 Jul 2020 22:01)  POCT Blood Glucose.: 176 mg/dL (04 Jul 2020 21:04)  POCT Blood Glucose.: 199 mg/dL (04 Jul 2020 19:37)  POCT Blood Glucose.: 222 mg/dL (04 Jul 2020 17:27)  POCT Blood Glucose.: 214 mg/dL (04 Jul 2020 13:00)  POCT Blood Glucose.: 149 mg/dL (04 Jul 2020 10:15)  POCT Blood Glucose.: 118 mg/dL (04 Jul 2020 07:55)  POCT Blood Glucose.: 118 mg/dL (04 Jul 2020 06:53)  POCT Blood Glucose.: 127 mg/dL (04 Jul 2020 06:03)  POCT Blood Glucose.: 132 mg/dL (04 Jul 2020 04:59)  POCT Blood Glucose.: 137 mg/dL (04 Jul 2020 04:00)  POCT Blood Glucose.: 156 mg/dL (04 Jul 2020 03:07)  POCT Blood Glucose.: 174 mg/dL (04 Jul 2020 02:00)  POCT Blood Glucose.: 202 mg/dL (04 Jul 2020 00:56)    Meds: dextrose 50% Injectable 50 milliLiter(s) IV Push every 15 minutes  dextrose 50% Injectable 25 milliLiter(s) IV Push every 15 minutes  insulin regular Infusion 2 Unit(s)/Hr IV Continuous <Continuous>  methylPREDNISolone sodium succinate Injectable 30 milliGRAM(s) IV Push two times a day        ACCESS DEVICES:  [x] Peripheral IV  [ ] Central Venous Line	[ ] R	[ ] L	[ ] IJ	[ ] Fem	[ ] SC	Placed:   [ ] Arterial Line		[ ] R	[ ] L	[ ] Fem	[ ] Rad	[ ] Ax	Placed:   [ ] PICC:					[ ] Mediport  [ ] Urinary Catheter, Date Placed:   [x] Necessity of urinary, arterial, and venous catheters discussed    OTHER MEDICATIONS:  chlorhexidine 2% Cloths 1 Application(s) Topical <User Schedule>  chlorhexidine 4% Liquid 1 Application(s) Topical <User Schedule>  collagenase Ointment 1 Application(s) Topical daily  nystatin Cream 1 Application(s) Topical two times a day      CODE STATUS: full code      IMAGING: < from: CT Pelvis No Cont (06.12.20 @ 17:37) >  PROCEDURE:   CT of the Pelvis was performed without intravenous contrast.   Intravenous contrast: None.  Oral contrast: None.  Sagittal and coronal reformats were performed.    FINDINGS:  BLADDER: Within normal limits.  REPRODUCTIVE ORGANS: The prostate is normal in size with a few foci of calcifications. No discrete fluid collection is seen.  LYMPH NODES: No pelvic lymphadenopathy.    VISUALIZED PORTIONS:  ABDOMINAL ORGANS: 0.6 cm no obstructive calculus in the lower pole of the left kidney is unchanged.  BOWEL: Moderate to large amount of stool and rectum and colon. No small bowel obstruction.  PERITONEUM: No ascites.  VESSELS: Arterial calcifications.  ABDOMINAL WALL: Within normal limits.  BONES: Degenerative changes.    IMPRESSION:     Evaluation without intravenous contrast. No prostate abscess noted within the limitations of the exam.      < end of copied text > HISTORY  64y Male c/b small esophageal varices (12/2019), portal hypertensive gastropathy, ascites, hx SBP, hx hepatic encephalopathy, GAVE syndrome s/p APC, hx duodenal ulcer (5/2019), HTN, HLD, DM, HFpEF (EF 70%), recent ESBL E coli prostate abscess (3/2020 s/p 4 weeks ertapenem), orthostatic hypotension ( on midodrine) who was initially admitted to medicine for hepatic encephalopathy. Hospital course c/b MISA, VRE UTI, acute blood loss anemia, received blood transfusion, EGD done 6/22 with Grade II esophageal varices, acute gastritis with hemorrhage and acute duodenitis with hemorrhage. Patient had worsening mental status, concerning decompensating liver cirrhosis, hepatic encephalopathy patient then transferred to SICU for further care.        24 HOUR EVENTS:  - On/off cardene gtt; now on cardene   -CPAP extubated  - NGT discontinued  - IVF decreased to 75ml/hr  - PLaced on CLD  - Started home PO meds   - Remains on meropenem for foot ulcer     SUBJECTIVE/ROS:  [ ] A ten-point review of systems was otherwise negative except as noted.  [ ] Due to altered mental status/intubation, subjective information were not able to be obtained from the patient. History was obtained, to the extent possible, from review of the chart and collateral sources of information.      NEURO  Exam: awake, alert, oriented  Meds: HYDROmorphone  Injectable 0.5 milliGRAM(s) IV Push every 3 hours PRN Moderate Pain (4 - 6)  oxyCODONE    IR 5 milliGRAM(s) Oral every 4 hours PRN Moderate Pain (4 - 6)  oxyCODONE    IR 10 milliGRAM(s) Oral every 4 hours PRN Severe Pain (7 - 10)    [x] Adequacy of sedation and pain control has been assessed and adjusted      RESPIRATORY  RR: 17 (07-05-20 @ 00:15) (8 - 28)  SpO2: 97% (07-05-20 @ 00:15) (93% - 100%)  Exam: unlabored, clear to auscultation bilaterally  Mechanical Ventilation: Mode: CPAP with PS, RR (patient): 22, FiO2: 40, PEEP: 5, PS: 5, MAP: 10  ABG - ( 04 Jul 2020 08:45 )  pH: 7.39  /  pCO2: 49    /  pO2: 157   / HCO3: 29    / Base Excess: 3.7   /  SaO2: 99      Lactate: x      [N/A] Extubation Readiness Assessed  Meds: none      CARDIOVASCULAR  HR: 84 (07-05-20 @ 00:15) (52 - 90)  BP: 154/72 (07-04-20 @ 19:15) (154/72 - 154/72)  BP(mean): 103 (07-04-20 @ 19:15) (103 - 103)  ABP: 123/46 (07-05-20 @ 00:15) (123/46 - 163/58)  ABP(mean): 72 (07-05-20 @ 00:15) (71 - 96)  Exam: regular rate and rhythm  Cardiac Rhythm: sinus  Perfusion     [x]Adequate   [ ]Inadequate  Mentation   [x]Normal       [ ]Reduced  Extremities  [x]Warm         [ ]Cool  Volume Status [ ]Hypervolemic [x]Euvolemic [ ]Hypovolemic  Meds: amLODIPine   Tablet 5 milliGRAM(s) Oral daily  labetalol Injectable 10 milliGRAM(s) IV Push every 1 hour PRN systolic blood pressure GREATER THAN 170 mmHg or diastolic blood pressure GREATER THAN 100 mmHg  niCARdipine Infusion 5 mG/Hr IV Continuous <Continuous>  tamsulosin 0.4 milliGRAM(s) Oral at bedtime        GI/NUTRITION  Exam: soft, nontender, nondistended  Diet: CLD   Meds: pantoprazole  Injectable 40 milliGRAM(s) IV Push <User Schedule>      GENITOURINARY  I&O's Detail    07-03 @ 07:01  -  07-04 @ 07:00  --------------------------------------------------------  IN:    Albumin 5%  - 250 mL: 250 mL    dexmedetomidine Infusion: 262.8 mL    dextrose 5% + sodium chloride 0.45%.: 3000 mL    Enteral Tube Flush: 280 mL    insulin regular Infusion: 97 mL    niCARdipine Infusion: 75 mL    octreotide  Infusion: 4 mL    Solution: 300 mL  Total IN: 4268.8 mL    OUT:    Drain: 82 mL    Drain: 141 mL    Drain: 69 mL    Indwelling Catheter - Urethral: 1710 mL    Nasoenteral Tube: 450 mL  Total OUT: 2452 mL    Total NET: 1816.8 mL      07-04 @ 07:01  -  07-05 @ 00:26  --------------------------------------------------------  IN:    dextrose 5% + sodium chloride 0.45%.: 750 mL    dextrose 5% + sodium chloride 0.45%.: 825 mL    Enteral Tube Flush: 190 mL    insulin regular Infusion: 8.5 mL    insulin regular Infusion: 3 mL    niCARdipine Infusion: 37.5 mL    Oral Fluid: 1892 mL    Solution: 150 mL  Total IN: 3856 mL    OUT:    Drain: 41 mL    Drain: 39 mL    Drain: 37 mL    Indwelling Catheter - Urethral: 610 mL    Nasoenteral Tube: 80 mL    Voided: 300 mL  Total OUT: 1107 mL    Total NET: 2749 mL          07-04    135  |  101  |  47<H>  ----------------------------<  202<H>  4.1   |  23  |  1.28    Ca    9.8      04 Jul 2020 19:49  Phos  4.1     07-04  Mg     2.0     07-04    TPro  5.1<L>  /  Alb  3.2<L>  /  TBili  1.8<H>  /  DBili  0.8<H>  /  AST  76<H>  /  ALT  243<H>  /  AlkPhos  57  07-04    [ ] Beasley catheter, indication: N/A  Meds: dextrose 5% + sodium chloride 0.45%. 1000 milliLiter(s) IV Continuous <Continuous>  sodium chloride 0.9% lock flush 10 milliLiter(s) IV Push every 1 hour PRN Pre/post blood products, medications, blood draw, and to maintain line patency        HEMATOLOGIC  Meds: none  [x] VTE Prophylaxis                        10.0   14.30 )-----------( 113      ( 04 Jul 2020 19:49 )             28.9     PT/INR - ( 04 Jul 2020 19:49 )   PT: 14.4 sec;   INR: 1.27 ratio         PTT - ( 04 Jul 2020 19:49 )  PTT:24.0 sec  Transfusion     [ ] PRBC   [ ] Platelets   [ ] FFP   [ ] Cryoprecipitate      INFECTIOUS DISEASES  WBC Count: 14.30 K/uL (07-04 @ 19:49)  WBC Count: 13.49 K/uL (07-04 @ 14:23)  WBC Count: 11.68 K/uL (07-04 @ 08:10)  WBC Count: 11.09 K/uL (07-04 @ 02:07)    RECENT CULTURES:  Specimen Source: .Blood Blood  Date/Time: 07-03 @ 11:00  Culture Results:   No growth to date.  Gram Stain: --  Organism: --  Specimen Source: .Urine Catheterized  Date/Time: 07-03 @ 10:11  Culture Results:   No growth  Gram Stain: --  Organism: --  Specimen Source: .Surgical Swab abdominal ascites culture  Date/Time: 07-03 @ 01:05  Culture Results:   No growth  Gram Stain: --  Organism: --    Meds: meropenem  IVPB 1000 milliGRAM(s) IV Intermittent every 8 hours  mycophenolate mofetil Suspension 1000 milliGRAM(s) Oral <User Schedule>  nystatin    Suspension 852622 Unit(s) Swish and Swallow four times a day  tacrolimus    0.5 mG/mL Suspension 4 milliGRAM(s) Oral <User Schedule>  trimethoprim  40 mG/sulfamethoxazole 200 mG Suspension 80 milliGRAM(s) Oral daily  valGANciclovir 50 mG/mL Oral Solution 450 milliGRAM(s) Oral daily        ENDOCRINE  CAPILLARY BLOOD GLUCOSE      POCT Blood Glucose.: 168 mg/dL (04 Jul 2020 22:57)  POCT Blood Glucose.: 166 mg/dL (04 Jul 2020 22:01)  POCT Blood Glucose.: 176 mg/dL (04 Jul 2020 21:04)  POCT Blood Glucose.: 199 mg/dL (04 Jul 2020 19:37)  POCT Blood Glucose.: 222 mg/dL (04 Jul 2020 17:27)  POCT Blood Glucose.: 214 mg/dL (04 Jul 2020 13:00)  POCT Blood Glucose.: 149 mg/dL (04 Jul 2020 10:15)  POCT Blood Glucose.: 118 mg/dL (04 Jul 2020 07:55)  POCT Blood Glucose.: 118 mg/dL (04 Jul 2020 06:53)  POCT Blood Glucose.: 127 mg/dL (04 Jul 2020 06:03)  POCT Blood Glucose.: 132 mg/dL (04 Jul 2020 04:59)  POCT Blood Glucose.: 137 mg/dL (04 Jul 2020 04:00)  POCT Blood Glucose.: 156 mg/dL (04 Jul 2020 03:07)  POCT Blood Glucose.: 174 mg/dL (04 Jul 2020 02:00)  POCT Blood Glucose.: 202 mg/dL (04 Jul 2020 00:56)    Meds: dextrose 50% Injectable 50 milliLiter(s) IV Push every 15 minutes  dextrose 50% Injectable 25 milliLiter(s) IV Push every 15 minutes  insulin regular Infusion 2 Unit(s)/Hr IV Continuous <Continuous>  methylPREDNISolone sodium succinate Injectable 30 milliGRAM(s) IV Push two times a day        ACCESS DEVICES:  [x] Peripheral IV  [ ] Central Venous Line	[ ] R	[ ] L	[ ] IJ	[ ] Fem	[ ] SC	Placed:   [ ] Arterial Line		[ ] R	[ ] L	[ ] Fem	[ ] Rad	[ ] Ax	Placed:   [ ] PICC:					[ ] Mediport  [ ] Urinary Catheter, Date Placed:   [x] Necessity of urinary, arterial, and venous catheters discussed    OTHER MEDICATIONS:  chlorhexidine 2% Cloths 1 Application(s) Topical <User Schedule>  chlorhexidine 4% Liquid 1 Application(s) Topical <User Schedule>  collagenase Ointment 1 Application(s) Topical daily  nystatin Cream 1 Application(s) Topical two times a day      CODE STATUS: full code      IMAGING: < from: CT Pelvis No Cont (06.12.20 @ 17:37) >  PROCEDURE:   CT of the Pelvis was performed without intravenous contrast.   Intravenous contrast: None.  Oral contrast: None.  Sagittal and coronal reformats were performed.    FINDINGS:  BLADDER: Within normal limits.  REPRODUCTIVE ORGANS: The prostate is normal in size with a few foci of calcifications. No discrete fluid collection is seen.  LYMPH NODES: No pelvic lymphadenopathy.    VISUALIZED PORTIONS:  ABDOMINAL ORGANS: 0.6 cm no obstructive calculus in the lower pole of the left kidney is unchanged.  BOWEL: Moderate to large amount of stool and rectum and colon. No small bowel obstruction.  PERITONEUM: No ascites.  VESSELS: Arterial calcifications.  ABDOMINAL WALL: Within normal limits.  BONES: Degenerative changes.    IMPRESSION:     Evaluation without intravenous contrast. No prostate abscess noted within the limitations of the exam.      < end of copied text >

## 2020-07-05 NOTE — PROGRESS NOTE ADULT - ATTENDING COMMENTS
Patient alert s/p liver transplant.  Today has developed significant tremor following tacrolimus dosing in AM.  Await tacrolimus level to direct dose adjustment per transplant team. No evidence of active infection . bob removed. heel would followed by podiatry.  Concern for melena stool this AM.  Hgb stable in spite of positive fluid balance.  Suggest trend Hgb and follow stool output.  Increase PPI dose to bid.

## 2020-07-05 NOTE — PROGRESS NOTE ADULT - SUBJECTIVE AND OBJECTIVE BOX
Patient is a 64y old  Male who presents with a chief complaint of Liver failure, hepatic encephalopathy (05 Jul 2020 00:25)    Being followed by ID for OLT    Interval history:1 episode of melenic stool  otherwise stable  denies any pain   No acute events      ROS:  No cough,SOB,CP  No N/V/D./abd pain  No other complaints      Antimicrobials:    meropenem  IVPB 1000 milliGRAM(s) IV Intermittent every 8 hours  nystatin    Suspension 056001 Unit(s) Swish and Swallow four times a day  trimethoprim   80 mG/sulfamethoxazole 400 mG 1 Tablet(s) Oral daily  valGANciclovir 450 milliGRAM(s) Oral daily    Other medications reviewed    Vital Signs Last 24 Hrs  T(C): 36.7 (07-05-20 @ 07:00), Max: 36.7 (07-05-20 @ 03:00)  T(F): 98 (07-05-20 @ 07:00), Max: 98.1 (07-05-20 @ 03:00)  HR: 90 (07-05-20 @ 11:00) (74 - 90)  BP: 154/72 (07-04-20 @ 19:15) (154/72 - 154/72)  BP(mean): 103 (07-04-20 @ 19:15) (103 - 103)  RR: 20 (07-05-20 @ 11:00) (10 - 28)  SpO2: 94% (07-05-20 @ 11:00) (64% - 100%)    Physical Exam:      Neck supple no JVD or LN  LIJ swan no erythema or discharge     Chest Good AE,CTA    CVS RRR S1 S2 WNl No murmur or rub or gallop    Abd soft BS hypo  Incision ss with dressing no discvharge  JPs    Ext Heel ulcer dressing in place       IV site no erythema tenderness or discharge    Joints no swelling or LOM    CNS AAOX 3  Lab Data:                          9.8    11.95 )-----------( 105      ( 05 Jul 2020 07:57 )             28.0       07-05    133<L>  |  98  |  46<H>  ----------------------------<  169<H>  4.1   |  26  |  1.15    Ca    9.7      05 Jul 2020 07:57  Phos  3.6     07-05  Mg     2.0     07-05    TPro  5.6<L>  /  Alb  3.3  /  TBili  2.2<H>  /  DBili  1.1<H>  /  AST  67<H>  /  ALT  217<H>  /  AlkPhos  62  07-05        Culture - Blood (collected 03 Jul 2020 11:00)  Source: .Blood Blood  Preliminary Report (04 Jul 2020 12:01):    No growth to date.    Culture - Blood (collected 03 Jul 2020 11:00)  Source: .Blood Blood  Preliminary Report (04 Jul 2020 12:01):    No growth to date.    Culture - Urine (collected 03 Jul 2020 10:11)  Source: .Urine Catheterized  Final Report (04 Jul 2020 07:24):    No growth    Culture - Surgical Swab (collected 03 Jul 2020 01:05)  Source: .Surgical Swab abdominal ascites culture  Preliminary Report (03 Jul 2020 18:12):    No growth        < from: Xray Chest 1 View- PORTABLE-Routine (07.04.20 @ 07:09) >    Impression:    Clear lungs. Lines and tubes as above.        < end of copied text >

## 2020-07-05 NOTE — PROGRESS NOTE ADULT - SUBJECTIVE AND OBJECTIVE BOX
Chief Complaint:  Patient is a 64y old  Male who presents with a chief complaint of Liver failure, hepatic encephalopathy (2020 11:02)    Reason for consult: s/p liver transplant    Interval Events: Pt tremulous this AM. Reported to have melena overnight, Hb stable so far.     Hospital Medications:  chlorhexidine 2% Cloths 1 Application(s) Topical <User Schedule>  chlorhexidine 4% Liquid 1 Application(s) Topical <User Schedule>  collagenase Ointment 1 Application(s) Topical daily  dextrose 50% Injectable 50 milliLiter(s) IV Push every 15 minutes  dextrose 50% Injectable 25 milliLiter(s) IV Push every 15 minutes  furosemide   Injectable 40 milliGRAM(s) IV Push once  HYDROmorphone  Injectable 0.5 milliGRAM(s) IV Push every 3 hours PRN  insulin glargine Injectable (LANTUS) 5 Unit(s) SubCutaneous at bedtime  insulin regular Infusion 2 Unit(s)/Hr IV Continuous <Continuous>  labetalol Injectable 10 milliGRAM(s) IV Push every 1 hour PRN  meropenem  IVPB 1000 milliGRAM(s) IV Intermittent every 8 hours  methylPREDNISolone sodium succinate Injectable 30 milliGRAM(s) IV Push two times a day  mycophenolate mofetil 1000 milliGRAM(s) Oral <User Schedule>  NIFEdipine XL 30 milliGRAM(s) Oral daily  nystatin    Suspension [id number] Unit(s) Swish and Swallow four times a day  nystatin Cream 1 Application(s) Topical two times a day  oxyCODONE    IR 5 milliGRAM(s) Oral every 4 hours PRN  oxyCODONE    IR 10 milliGRAM(s) Oral every 4 hours PRN  pantoprazole  Injectable 40 milliGRAM(s) IV Push every 12 hours  sodium chloride 0.9% lock flush 10 milliLiter(s) IV Push every 1 hour PRN  tamsulosin 0.4 milliGRAM(s) Oral at bedtime  trimethoprim   80 mG/sulfamethoxazole 400 mG 1 Tablet(s) Oral daily  valGANciclovir 450 milliGRAM(s) Oral daily      ROS:   General:  No  fevers, chills, night sweats, fatigue  Eyes:  Good vision, no reported pain  ENT:  No sore throat, pain, runny nose  CV:  No pain, palpitations  Pulm:  No dyspnea, cough  GI:  See HPI, otherwise negative  :  No  incontinence, nocturia  Muscle:  No pain, weakness  Neuro:  No memory problems  Psych:  No insomnia, mood problems, depression  Endocrine:  No polyuria, polydipsia, cold/heat intolerance  Heme:  No petechiae, ecchymosis, easy bruisability  Skin:  No rash    PHYSICAL EXAM:   Vital Signs:  Vital Signs Last 24 Hrs  T(C): 36.6 (2020 11:00), Max: 36.7 (2020 03:00)  T(F): 97.9 (2020 11:00), Max: 98.1 (2020 03:00)  HR: 90 (2020 12:00) (75 - 90)  BP: 154/72 (2020 19:15) (154/72 - 154/72)  BP(mean): 103 (2020 19:15) (103 - 103)  RR: 19 (2020 12:00) (10 - 28)  SpO2: 94% (2020 12:00) (64% - 100%)  Daily     Daily Weight in k.9 (2020 01:28)    GENERAL: no acute distress  NEURO: alert, no asterixis, ++ intention tremor  HEENT: anicteric sclera, no conjunctival pallor appreciated  CHEST: no respiratory distress, no accessory muscle use  CARDIAC: regular rate, rhythm  ABDOMEN: soft, incisions intact, drains in place w/ serosanguinous drainage  EXTREMITIES: warm, well perfused, no edema  SKIN: no lesions noted    LABS: reviewed                        9.8    11.95 )-----------( 105      ( 2020 07:57 )             28.0     07-05    133<L>  |  98  |  46<H>  ----------------------------<  169<H>  4.1   |  26  |  1.15    Ca    9.7      2020 07:57  Phos  3.6     07-05  Mg     2.0     07-05    TPro  5.6<L>  /  Alb  3.3  /  TBili  2.2<H>  /  DBili  1.1<H>  /  AST  67<H>  /  ALT  217<H>  /  AlkPhos  62  07-05    LIVER FUNCTIONS - ( 2020 07:57 )  Alb: 3.3 g/dL / Pro: 5.6 g/dL / ALK PHOS: 62 U/L / ALT: 217 U/L / AST: 67 U/L / GGT: x             Interval Diagnostic Studies: see sunrise for full report

## 2020-07-05 NOTE — PROVIDER CONTACT NOTE (MEDICATION) - RECOMMENDATIONS
Insulin gtt to be initiated as per MD rounds, SICU team to determine if a bolus dose of insulin needed prior to initiating the insulin gtt

## 2020-07-05 NOTE — PROGRESS NOTE ADULT - ASSESSMENT
PLAN:  Neurologic: hepatic encephalopathy. Sedation post-op  - Precedex gtt for sedation  - PRN dilaudid     Respiratory: Intubated  - Continue PRVC 14/500/5/40%  - SBT in the morning     Cardiovascular: INtermittent hypertension   - Cardeene gtt for tight BP cointrol. Goal SBO , 145mmHg   - L. Radial A-line    Gastrointestinal/Nutrition: GIB, JEAN cirrhosis, GAVE syndrome. S/p liver transplant   - NPO/OGT   - Protonix 40mg. daily    Renal: urinary retention, hyponatremia  - D5/0.45NS @125ml/hr     Heme: coagulopathy  - Venodynes for mechanical VTE prophylaxis, holding chemical VTE prophylaxis in the setting of coagulopathy/GIB    Infectious Disease: Emperic Coverage and prophyaxis in setting of immunosuppresion   - Meropenem/ daptomycin 36 hrs post-op  - Conitnue PPx as per trasnplant: Bactrim, valcyte  - Continue immunosuppresion as per tranplant; solumedrol    Endo: DM type II, HLD  - Weaned off insulin gtt     Disposition:  - SICU full code     - Wander Aguirre PA-C PLAN:  Neurologic: hepatic encephalopathy.   - PRN dilaudid     Respiratory:   - No acute issues     Cardiovascular: Intermittent hypertension   - Cardeene gtt for tight BP cointrol. Goal SBO , 145mmHg   - L. Radial A-line    Gastrointestinal/Nutrition: GIB, JEAN cirrhosis, GAVE syndrome. S/p liver transplant   - NPO/OGT   - Protonix 40mg. daily    Renal: urinary retention, hyponatremia  - D5/0.45NS @125ml/hr     Heme: coagulopathy  - Venodynes for mechanical VTE prophylaxis, holding chemical VTE prophylaxis in the setting of coagulopathy/GIB    Infectious Disease: Emperic Coverage and prophyaxis in setting of immunosuppresion   - c/w meropenem -diabetic foot ulcer   - Conitnue PPx as per trasnplant: Bactrim, valcyte  - Continue immunosuppresion as per tranplant; solumedrol    Endo: DM type II, HLD  - Weaned off insulin gtt     Disposition:  - SICU full code     - Wander Aguirre PA-C

## 2020-07-05 NOTE — PROGRESS NOTE ADULT - SUBJECTIVE AND OBJECTIVE BOX
Transplant Surgery - Multidisciplinary Rounds  --------------------------------------------------------------  OLTx      Date:  7/2/2020       POD#3    Present: Patient seen with multidisciplinary team including Transplant Surgeon: Dr. Lucio.  Transplant Hepatologist Dr. Cano, hepatology fellow, Transplant Nephrologist: Dr. FAY Patel, OLY Bingham during am rounds and examined with Dr. Lucio. Disciplines not in attendance will be notified of the plan.     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     Re-admitted on 6/11:   ·	worsening HE  ·	UTI/VRE: tx with linezolid 6/15-22, bactrim DS 6/22 - 7/2  ·	MISA/Hyponatremia  ·	UGI bleed (melena) s/p EGD (6/22) c/w hemorrhagic duodenitis/gastritis; Grade 2 EV; requiring multiple transfusions  ·	Known h/o R foot ulcer (MRI neg for OM)  ·	s/p OLTx on 7/2/2020, post op liver doppler with patent vessels    OR details:  - L groin cutdown for vv bypass   - anastomosis: bicaval end-end/CBD duct-duct/HA & PV end-end, all standard anatomy.    - IOC with good flow  - Simulect induction. 500mg Solumedrol  - JPs x3 with T-Tube  - 14U pRBC, 14U FFP, 10 PLT, 11 CRYO, 500mL returned from cell saver, EBL 5L, UOP 1100mL    Recipient Info: ABO: A  CMV:  Neg  EBV Positive    Donor:  Donor ID:  [id number] Match: [id number]  Age: 29 ABO:  A1 High Risk:   No COD: Cardiovascular  Anti CMV positive, EBV IgG- positive  HepBcAb-neg, Hepatitis C-DANA- neg, Hepatitis C ab-neg    Interval events:  - POD 3 s/p OLT; good graft function, T bili ~2  - Drains: NGT/Bob removed yesterday, medial ANDREINA (#3) removed this am, T tube (zero), JP1 (51cc SS), ANDREINA 2 (50cc SS)  - Few episodes of melena overnight (H/H stable 9.8/28), increased Prononix 40mg iv BID  - Having diarrhea this am ( will send c diff)   - Tolerating clear liquid diet; advanced to regular, dced IVF  - LE edema: lasix 40mg IVP x 1 dose  - Noted to have UE tremors on exam -b/l, Mag 2, FK level 7.3   - HTN: required nicardipine gtt overnight; now off. Started nifedipine    Potential discharge date: pending clinical improvement    Education: Medication    Plan of Care: See below      MEDICATIONS  (STANDING):  chlorhexidine 2% Cloths 1 Application(s) Topical <User Schedule>  chlorhexidine 4% Liquid 1 Application(s) Topical <User Schedule>  collagenase Ointment 1 Application(s) Topical daily  dextrose 50% Injectable 50 milliLiter(s) IV Push every 15 minutes  dextrose 50% Injectable 25 milliLiter(s) IV Push every 15 minutes  furosemide   Injectable 40 milliGRAM(s) IV Push once  insulin glargine Injectable (LANTUS) 5 Unit(s) SubCutaneous at bedtime  insulin regular Infusion 2 Unit(s)/Hr (2 mL/Hr) IV Continuous <Continuous>  meropenem  IVPB 1000 milliGRAM(s) IV Intermittent every 8 hours  methylPREDNISolone sodium succinate Injectable 30 milliGRAM(s) IV Push two times a day  mycophenolate mofetil 1000 milliGRAM(s) Oral <User Schedule>  NIFEdipine XL 30 milliGRAM(s) Oral daily  nystatin    Suspension [id number] Unit(s) Swish and Swallow four times a day  nystatin Cream 1 Application(s) Topical two times a day  pantoprazole  Injectable 40 milliGRAM(s) IV Push every 12 hours  tamsulosin 0.4 milliGRAM(s) Oral at bedtime  trimethoprim   80 mG/sulfamethoxazole 400 mG 1 Tablet(s) Oral daily  valGANciclovir 450 milliGRAM(s) Oral daily    MEDICATIONS  (PRN):  HYDROmorphone  Injectable 0.5 milliGRAM(s) IV Push every 3 hours PRN breakthrough  labetalol Injectable 10 milliGRAM(s) IV Push every 1 hour PRN systolic blood pressure GREATER THAN 170 mmHg or diastolic blood pressure GREATER THAN 100 mmHg  oxyCODONE    IR 5 milliGRAM(s) Oral every 4 hours PRN Moderate Pain (4 - 6)  oxyCODONE    IR 10 milliGRAM(s) Oral every 4 hours PRN Severe Pain (7 - 10)  sodium chloride 0.9% lock flush 10 milliLiter(s) IV Push every 1 hour PRN Pre/post blood products, medications, blood draw, and to maintain line patency      PAST MEDICAL & SURGICAL HISTORY:  GIB (gastrointestinal bleeding)  GERD with esophagitis: Gastritis &amp; Non Bleeding Ulcers  Hepatic encephalopathy  Obesity  Fatty liver disease, nonalcoholic  Renal stones: 25 years ago  Hypertension  Neuropathy  Hypercholesteremia  Diabetes  S/P cholecystectomy      Vital Signs Last 24 Hrs  T(C): 36.6 (05 Jul 2020 11:00), Max: 36.7 (05 Jul 2020 03:00)  T(F): 97.9 (05 Jul 2020 11:00), Max: 98.1 (05 Jul 2020 03:00)  HR: 90 (05 Jul 2020 12:00) (75 - 90)  BP: 154/72 (04 Jul 2020 19:15) (154/72 - 154/72)  BP(mean): 103 (04 Jul 2020 19:15) (103 - 103)  RR: 19 (05 Jul 2020 12:00) (10 - 28)  SpO2: 94% (05 Jul 2020 12:00) (64% - 100%)    I&O's Summary    04 Jul 2020 07:01  -  05 Jul 2020 07:00  --------------------------------------------------------  IN: 4960 mL / OUT: 1550 mL / NET: 3410 mL    05 Jul 2020 07:01  -  05 Jul 2020 12:42  --------------------------------------------------------  IN: 558.5 mL / OUT: 520 mL / NET: 38.5 mL                          9.8    11.95 )-----------( 105      ( 05 Jul 2020 07:57 )             28.0     07-05    133<L>  |  98  |  46<H>  ----------------------------<  169<H>  4.1   |  26  |  1.15    Ca    9.7      05 Jul 2020 07:57  Phos  3.6     07-05  Mg     2.0     07-05    TPro  5.6<L>  /  Alb  3.3  /  TBili  2.2<H>  /  DBili  1.1<H>  /  AST  67<H>  /  ALT  217<H>  /  AlkPhos  62  07-05    Tacrolimus (), Serum: 7.3 ng/mL (07-05 @ 08:28)    Culture - Blood (collected 07-03-20 @ 11:00)  Source: .Blood Blood  Preliminary Report (07-04-20 @ 12:01):    No growth to date.    Culture - Blood (collected 07-03-20 @ 11:00)  Source: .Blood Blood  Preliminary Report (07-04-20 @ 12:01):    No growth to date.    Culture - Urine (collected 07-03-20 @ 10:11)  Source: .Urine Catheterized  Final Report (07-04-20 @ 07:24):    No growth    Culture - Surgical Swab (collected 07-03-20 @ 01:05)  Source: .Surgical Swab abdominal ascites culture  Preliminary Report (07-03-20 @ 18:12):    No growth    Review of systems  Gen: fatigue/weakness  Skin: no rashes  Head/Eyes/Ears/Mouth: no headache, no changes in vision  Respiratory: no SOB  CV: no chest pain  GI: as in HPI  : bob removed  MSK: + LE edema  Neuro:  no seizures  Heme: no easy bruising  Endo: no heat/cold intolerance  Psych: no significant nervousness, anxiety, stress  All other systems were reviewed and are negative, except as noted     PHYSICAL EXAM:  Constitutional: Well developed / well nourished  Eyes: mild icterus  ENMT: NC/AT  Neck: supple  Respiratory: CTA b/l  Cardiovascular: RRR  Gastrointestinal: Soft abdomen, mild distended; + incisional tenderness, incision c/d/i, staples intact, T-tube w/ no drainage.  JPs x3--all ss. Medial ANDREINA removed   Genitourinary: voiding spontaneously   Extremities: SCD's in place and working bilaterally. L groin cutdown site c/d/i. R a-line c/d/i.  Vascular: Palpable dp pulses bilaterally.   Neurological: AO x 2, upper extrem tremors  Skin: no rashes, ulcerations, lesions post-op  Psychiatric: awake, alert and oriented

## 2020-07-05 NOTE — PROGRESS NOTE ADULT - ASSESSMENT
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    [] s/p OLTx  - Good graft function, LFTs trending down, t bili down ~2  - Immuno: FK by level, MMF 1/1, pred taper, Simulect induction   - PPX: valcyte/bactrim/nystatin  - ASA on hold in setting of recent UGI bleed  - Drains: Medial ANDREINA removed today, JPs x2, T tube to gravity, please d/c a line  - Diet: Regular diet; dced IVF  - Pain control  - DVT PPx: SCDs at all times.  - ID: Afebrile, bld cxs from 7/3 with NGTD, continue meropenem  - LE edema: Lasix 40mg IVP x 1 dose  - Melena: serial CBCs, increase Protonix 40mg IV bid  - Diarrhea: please send C Diff (ordered)  - Tremors: FK level 7.3 on FK 4/4, will reduce to 2/2, keep mag >2    - Cont ICU care

## 2020-07-05 NOTE — PROGRESS NOTE ADULT - ASSESSMENT
64 M hx of DM, HLD, GERD, HFpEF with mild LV diastolic dysfunction, and decompensated JEAN cirrhosis c/b ascites with hx of SBP, HE, duodenal ulcer, GAVE and duodenal AVM s/p APC (last on 10/11/19), [facility] listed for liver transplantation at [facility]. 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 He is now re-admitted with hepatic encephalopathy and VRE UTI, also with MISA on CKD. S/p liver transplant 7/2.    Impression:  #S/p liver transplant 7/2:  OR details:  - L groin cutdown for vv bypass   - anastomosis: bicaval end-end/CBD duct-duct/HA & PV end-end, all standard anatomy.    - IOC with good flow  - Simulect induction. 500mg Solumedrol  - JPs x3 with T-Tube  - 14U pRBC, 14U FFP, 10 PLT, 11 CRYO, 500mL returned from cell saver, EBL 5L, UOP 1100mL  Recipient Info:   ABO: A  CMV:  Neg  EBV Positive  Donor:  Donor ID:  [id number] Match: [id number]  Age: 29 ABO:  A1 High Risk:   No COD: Cardiovascular  Anti CMV positive, EBV IgG- positive  HepBcAb-neg, Hepatitis C-DANA- neg, Hepatitis C ab-neg    #GI bleed - pretransplant with acute blood loss anemia s/p 10U PRBCs, bleeding from known GAVE, Hb stable post-transplant, but reported melena 7/5 AM. If GI bleed is recurring unclear etiology as GAVE and PHG should have improved w/ liver transplant, most likely etiology is NG tube trauma  #Tremors – suspected tacro induced neurotoxicity, AM level pending  #R posterior heel wound w/ overlying eschar – no signs of infections, XR neg for gas, evaluated by podiatry and ID. MRI w/o contrast limited, but no overt signs of active infection.  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    Recommendation:  - remove lines/bob as able (per surgery/SICU)  - c/w cellcept, prograf, methylpred taper  - close glucose control while on steroids, consider keeping insulin drip despite very low levels  - f/u AM tacro level, if elevated (as suspected given tremors) would hold  - f/u Mg level, replete PRN  - c/w IV meropenem, f/u ID recs  - c/w nystatin, Bactrim, valcyte ppx  - trend Hb, given new report of melena, possible NG tube trauma  - PO PPI BID, monitor bowel movements  - d/c IVF  - Lasix 40mg IV today x 1  - trend renal function  - advance diet, low Na diet  - f/u wound care/nursing for R heel ulcer  - f/u transplant surgery recs  - rest of care per SICU team  - transplant hepatology will follow    Ze Dominguez  Gastroenterology Fellow  MONDAY-FRIDAY 8AM-5PM:  Available via [employer] Teams  Call [phone] ([facility]) or Page [phone] ([facility]) from 8am-5pm M-F  AT NIGHT AND ON WEEKENDS:  Please contact on-call GI fellow via answering service ([phone])

## 2020-07-05 NOTE — PROGRESS NOTE ADULT - ATTENDING COMMENTS
Dr. Mace (Attending Physician)  N - Pain well controlled, oxy 5, 10 dilaudid, hepatic encephalopathy clearing Ammonia, Serum: 27 umol/L (07.05.20 @ 02:00), tremors will Follow up tacro level today was 5.3 yesterday  P - Postop atelectasis on IS  C - HTN to 160s, was back on nicardipine gtt, will give nifedipine 30 today, tamsulosin started as well for bph  GI - Clear liquid diet Insulin gtt restarted, start regular diet, Protonix bid for GAVE  JEAN cirrhosis s/p Liver Transplant - bilirubin stable, improved   - + 3 liters yesterday, will dc IV fluids and start lasix 40 mg IVP  H - INR 1.2 not on dvt ppx  ID - Remains on meropenem for foot ulcer, will consult podiatry, ppx with bactrim, valacyclovir, nystatin  E - insulin gtt, received 27 units lantus in past 12 hours will give 10 units lantus stat, 5 units tonight and try to transition to diet, Hemoglobin A1C, Whole Blood: 6.4 cw prediabetes, steroids likely causing acute hyperglycemia

## 2020-07-05 NOTE — PROGRESS NOTE ADULT - ATTENDING COMMENTS
agree with above  doing well  lfts trending down  tremors; tacro toxicity  will probably hold tacro tonight

## 2020-07-05 NOTE — PROGRESS NOTE ADULT - ASSESSMENT
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   UCx 6/14 with VRE  complicated hospital course  Now s/p OLT for worsening status  In SICU-intubated  otherwise hemodynamically stable    A) OLT  Monitor for any s/s complications/nosocomial infection  ? melena per transplant team/sicu  continue bactrim,valcyte for prophylaxis  Other plan per transplant team    B)Pancytopenia, --s/p Filgrastim on 6/30  -Continue to follow CBC with diff    C)R Heel Eschar  some dicharge though no erythema or obvious purulence   continue wound care   case d/w Dr toro-in view of immunosuppression to continue current abx for now   Monitor wound -podiatry follow up    D)GIB - s/p EGD with Acute gastritis and duodenitis with hemorrhage  --Management per GI  melena today ?  --Continue to follow CBC with diff      Will tailor plan for ID issues  per course,results.Will defer to primary team on management of other issues.  Assessment, plan and recommendations as detailed above were discussed with the SICU   team,transplant team     Infectious Diseases Service will cover over weekend.  Please call [phone] if issues .

## 2020-07-06 LAB
ALBUMIN SERPL ELPH-MCNC: 3.1 G/DL — LOW (ref 3.3–5)
ALBUMIN SERPL ELPH-MCNC: 3.2 G/DL — LOW (ref 3.3–5)
ALP SERPL-CCNC: 78 U/L — SIGNIFICANT CHANGE UP (ref 40–120)
ALP SERPL-CCNC: 80 U/L — SIGNIFICANT CHANGE UP (ref 40–120)
ALT FLD-CCNC: 154 U/L — HIGH (ref 10–45)
ALT FLD-CCNC: 162 U/L — HIGH (ref 10–45)
AMMONIA BLD-MCNC: 31 UMOL/L — SIGNIFICANT CHANGE UP (ref 11–55)
ANION GAP SERPL CALC-SCNC: 1 MMOL/L — LOW (ref 5–17)
ANION GAP SERPL CALC-SCNC: 7 MMOL/L — SIGNIFICANT CHANGE UP (ref 5–17)
APTT BLD: 25.1 SEC — LOW (ref 27.5–35.5)
AST SERPL-CCNC: 43 U/L — HIGH (ref 10–40)
AST SERPL-CCNC: 45 U/L — HIGH (ref 10–40)
BILIRUB DIRECT SERPL-MCNC: 0.8 MG/DL — HIGH (ref 0–0.2)
BILIRUB DIRECT SERPL-MCNC: 0.9 MG/DL — HIGH (ref 0–0.2)
BILIRUB INDIRECT FLD-MCNC: 0.9 MG/DL — SIGNIFICANT CHANGE UP (ref 0.2–1)
BILIRUB INDIRECT FLD-MCNC: 0.9 MG/DL — SIGNIFICANT CHANGE UP (ref 0.2–1)
BILIRUB SERPL-MCNC: 1.7 MG/DL — HIGH (ref 0.2–1.2)
BILIRUB SERPL-MCNC: 1.8 MG/DL — HIGH (ref 0.2–1.2)
BLD GP AB SCN SERPL QL: NEGATIVE — SIGNIFICANT CHANGE UP
BUN SERPL-MCNC: 40 MG/DL — HIGH (ref 7–23)
BUN SERPL-MCNC: 42 MG/DL — HIGH (ref 7–23)
CALCIUM SERPL-MCNC: 9 MG/DL — SIGNIFICANT CHANGE UP (ref 8.4–10.5)
CALCIUM SERPL-MCNC: 9.1 MG/DL — SIGNIFICANT CHANGE UP (ref 8.4–10.5)
CALCIUM SERPL-MCNC: 9.3 MG/DL — SIGNIFICANT CHANGE UP (ref 8.4–10.5)
CHLORIDE SERPL-SCNC: 98 MMOL/L — SIGNIFICANT CHANGE UP (ref 96–108)
CHLORIDE SERPL-SCNC: 98 MMOL/L — SIGNIFICANT CHANGE UP (ref 96–108)
CO2 SERPL-SCNC: 27 MMOL/L — SIGNIFICANT CHANGE UP (ref 22–31)
CO2 SERPL-SCNC: 35 MMOL/L — HIGH (ref 22–31)
CREAT SERPL-MCNC: 1 MG/DL — SIGNIFICANT CHANGE UP (ref 0.5–1.3)
CREAT SERPL-MCNC: 1.02 MG/DL — SIGNIFICANT CHANGE UP (ref 0.5–1.3)
FIBRINOGEN AG PPP IA-MCNC: 252 MG/DL — SIGNIFICANT CHANGE UP
FIBRINOGEN AG PPP IA-MCNC: 256 MG/DL — SIGNIFICANT CHANGE UP
FIBRINOGEN AG PPP IA-MCNC: 267 MG/DL — SIGNIFICANT CHANGE UP
FIBRINOGEN AG PPP IA-MCNC: 285 MG/DL — SIGNIFICANT CHANGE UP
FIBRINOGEN PPP-MCNC: 176 MG/DL — LOW (ref 350–510)
GLUCOSE BLDC GLUCOMTR-MCNC: 120 MG/DL — HIGH (ref 70–99)
GLUCOSE BLDC GLUCOMTR-MCNC: 121 MG/DL — HIGH (ref 70–99)
GLUCOSE BLDC GLUCOMTR-MCNC: 154 MG/DL — HIGH (ref 70–99)
GLUCOSE BLDC GLUCOMTR-MCNC: 184 MG/DL — HIGH (ref 70–99)
GLUCOSE BLDC GLUCOMTR-MCNC: 197 MG/DL — HIGH (ref 70–99)
GLUCOSE BLDC GLUCOMTR-MCNC: 211 MG/DL — HIGH (ref 70–99)
GLUCOSE BLDC GLUCOMTR-MCNC: 213 MG/DL — HIGH (ref 70–99)
GLUCOSE BLDC GLUCOMTR-MCNC: 232 MG/DL — HIGH (ref 70–99)
GLUCOSE BLDC GLUCOMTR-MCNC: 238 MG/DL — HIGH (ref 70–99)
GLUCOSE BLDC GLUCOMTR-MCNC: 265 MG/DL — HIGH (ref 70–99)
GLUCOSE BLDC GLUCOMTR-MCNC: 317 MG/DL — HIGH (ref 70–99)
GLUCOSE BLDC GLUCOMTR-MCNC: 320 MG/DL — HIGH (ref 70–99)
GLUCOSE BLDC GLUCOMTR-MCNC: 331 MG/DL — HIGH (ref 70–99)
GLUCOSE SERPL-MCNC: 162 MG/DL — HIGH (ref 70–99)
GLUCOSE SERPL-MCNC: 224 MG/DL — HIGH (ref 70–99)
HCT VFR BLD CALC: 28.6 % — LOW (ref 39–50)
HCT VFR BLD CALC: 29.6 % — LOW (ref 39–50)
HGB BLD-MCNC: 10.1 G/DL — LOW (ref 13–17)
HGB BLD-MCNC: 9.5 G/DL — LOW (ref 13–17)
INR BLD: 1.28 RATIO — HIGH (ref 0.88–1.16)
MAGNESIUM SERPL-MCNC: 1.8 MG/DL — SIGNIFICANT CHANGE UP (ref 1.6–2.6)
MAGNESIUM SERPL-MCNC: 2.2 MG/DL — SIGNIFICANT CHANGE UP (ref 1.6–2.6)
MCHC RBC-ENTMCNC: 30.8 PG — SIGNIFICANT CHANGE UP (ref 27–34)
MCHC RBC-ENTMCNC: 31.5 PG — SIGNIFICANT CHANGE UP (ref 27–34)
MCHC RBC-ENTMCNC: 33.2 GM/DL — SIGNIFICANT CHANGE UP (ref 32–36)
MCHC RBC-ENTMCNC: 34.1 GM/DL — SIGNIFICANT CHANGE UP (ref 32–36)
MCV RBC AUTO: 92.2 FL — SIGNIFICANT CHANGE UP (ref 80–100)
MCV RBC AUTO: 92.9 FL — SIGNIFICANT CHANGE UP (ref 80–100)
NRBC # BLD: 0 /100 WBCS — SIGNIFICANT CHANGE UP (ref 0–0)
NRBC # BLD: 0 /100 WBCS — SIGNIFICANT CHANGE UP (ref 0–0)
PHOSPHATE SERPL-MCNC: 2.3 MG/DL — LOW (ref 2.5–4.5)
PHOSPHATE SERPL-MCNC: 2.6 MG/DL — SIGNIFICANT CHANGE UP (ref 2.5–4.5)
PLATELET # BLD AUTO: 87 K/UL — LOW (ref 150–400)
PLATELET # BLD AUTO: 90 K/UL — LOW (ref 150–400)
POTASSIUM SERPL-MCNC: 4.2 MMOL/L — SIGNIFICANT CHANGE UP (ref 3.5–5.3)
POTASSIUM SERPL-MCNC: 4.3 MMOL/L — SIGNIFICANT CHANGE UP (ref 3.5–5.3)
POTASSIUM SERPL-SCNC: 4.2 MMOL/L — SIGNIFICANT CHANGE UP (ref 3.5–5.3)
POTASSIUM SERPL-SCNC: 4.3 MMOL/L — SIGNIFICANT CHANGE UP (ref 3.5–5.3)
PROT SERPL-MCNC: 5.1 G/DL — LOW (ref 6–8.3)
PROT SERPL-MCNC: 5.3 G/DL — LOW (ref 6–8.3)
PROTHROM AB SERPL-ACNC: 14.6 SEC — HIGH (ref 10.6–13.6)
RBC # BLD: 3.08 M/UL — LOW (ref 4.2–5.8)
RBC # BLD: 3.21 M/UL — LOW (ref 4.2–5.8)
RBC # FLD: 15.9 % — HIGH (ref 10.3–14.5)
RBC # FLD: 16.1 % — HIGH (ref 10.3–14.5)
RH IG SCN BLD-IMP: POSITIVE — SIGNIFICANT CHANGE UP
SODIUM SERPL-SCNC: 132 MMOL/L — LOW (ref 135–145)
SODIUM SERPL-SCNC: 134 MMOL/L — LOW (ref 135–145)
TACROLIMUS SERPL-MCNC: 5.7 NG/ML — SIGNIFICANT CHANGE UP
WBC # BLD: 6.16 K/UL — SIGNIFICANT CHANGE UP (ref 3.8–10.5)
WBC # BLD: 7.03 K/UL — SIGNIFICANT CHANGE UP (ref 3.8–10.5)
WBC # FLD AUTO: 6.16 K/UL — SIGNIFICANT CHANGE UP (ref 3.8–10.5)
WBC # FLD AUTO: 7.03 K/UL — SIGNIFICANT CHANGE UP (ref 3.8–10.5)

## 2020-07-06 PROCEDURE — 71045 X-RAY EXAM CHEST 1 VIEW: CPT | Mod: 26

## 2020-07-06 PROCEDURE — 99222 1ST HOSP IP/OBS MODERATE 55: CPT

## 2020-07-06 PROCEDURE — 99233 SBSQ HOSP IP/OBS HIGH 50: CPT | Mod: GC

## 2020-07-06 PROCEDURE — 99232 SBSQ HOSP IP/OBS MODERATE 35: CPT | Mod: GC

## 2020-07-06 PROCEDURE — 99222 1ST HOSP IP/OBS MODERATE 55: CPT | Mod: GC

## 2020-07-06 PROCEDURE — 99232 SBSQ HOSP IP/OBS MODERATE 35: CPT

## 2020-07-06 RX ORDER — INSULIN LISPRO 100/ML
VIAL (ML) SUBCUTANEOUS
Refills: 0 | Status: DISCONTINUED | OUTPATIENT
Start: 2020-07-06 | End: 2020-07-06

## 2020-07-06 RX ORDER — TACROLIMUS 5 MG/1
3 CAPSULE ORAL ONCE
Refills: 0 | Status: COMPLETED | OUTPATIENT
Start: 2020-07-06 | End: 2020-07-06

## 2020-07-06 RX ORDER — MAGNESIUM SULFATE 500 MG/ML
2 VIAL (ML) INJECTION ONCE
Refills: 0 | Status: COMPLETED | OUTPATIENT
Start: 2020-07-06 | End: 2020-07-06

## 2020-07-06 RX ORDER — INSULIN LISPRO 100/ML
VIAL (ML) SUBCUTANEOUS AT BEDTIME
Refills: 0 | Status: DISCONTINUED | OUTPATIENT
Start: 2020-07-06 | End: 2020-07-06

## 2020-07-06 RX ORDER — INSULIN LISPRO 100/ML
3 VIAL (ML) SUBCUTANEOUS ONCE
Refills: 0 | Status: DISCONTINUED | OUTPATIENT
Start: 2020-07-06 | End: 2020-07-06

## 2020-07-06 RX ORDER — INSULIN HUMAN 100 [IU]/ML
3 INJECTION, SOLUTION SUBCUTANEOUS
Qty: 100 | Refills: 0 | Status: DISCONTINUED | OUTPATIENT
Start: 2020-07-06 | End: 2020-07-07

## 2020-07-06 RX ORDER — ASPIRIN/CALCIUM CARB/MAGNESIUM 324 MG
81 TABLET ORAL DAILY
Refills: 0 | Status: DISCONTINUED | OUTPATIENT
Start: 2020-07-06 | End: 2020-08-11

## 2020-07-06 RX ORDER — TACROLIMUS 5 MG/1
3 CAPSULE ORAL
Refills: 0 | Status: DISCONTINUED | OUTPATIENT
Start: 2020-07-06 | End: 2020-07-07

## 2020-07-06 RX ORDER — NIFEDIPINE 30 MG
30 TABLET, EXTENDED RELEASE 24 HR ORAL DAILY
Refills: 0 | Status: DISCONTINUED | OUTPATIENT
Start: 2020-07-06 | End: 2020-07-11

## 2020-07-06 RX ORDER — FUROSEMIDE 40 MG
40 TABLET ORAL ONCE
Refills: 0 | Status: COMPLETED | OUTPATIENT
Start: 2020-07-06 | End: 2020-07-06

## 2020-07-06 RX ORDER — INSULIN HUMAN 100 [IU]/ML
3 INJECTION, SOLUTION SUBCUTANEOUS ONCE
Refills: 0 | Status: COMPLETED | OUTPATIENT
Start: 2020-07-06 | End: 2020-07-06

## 2020-07-06 RX ADMIN — Medication 1 TABLET(S): at 11:18

## 2020-07-06 RX ADMIN — Medication 100000 UNIT(S): at 17:02

## 2020-07-06 RX ADMIN — Medication 4: at 16:59

## 2020-07-06 RX ADMIN — Medication 50 GRAM(S): at 09:09

## 2020-07-06 RX ADMIN — Medication 1 APPLICATION(S): at 11:16

## 2020-07-06 RX ADMIN — MEROPENEM 100 MILLIGRAM(S): 1 INJECTION INTRAVENOUS at 21:30

## 2020-07-06 RX ADMIN — Medication 20 MILLIGRAM(S): at 17:03

## 2020-07-06 RX ADMIN — MYCOPHENOLATE MOFETIL 1000 MILLIGRAM(S): 250 CAPSULE ORAL at 20:58

## 2020-07-06 RX ADMIN — NYSTATIN CREAM 1 APPLICATION(S): 100000 CREAM TOPICAL at 17:13

## 2020-07-06 RX ADMIN — Medication 40 MILLIGRAM(S): at 10:06

## 2020-07-06 RX ADMIN — PANTOPRAZOLE SODIUM 40 MILLIGRAM(S): 20 TABLET, DELAYED RELEASE ORAL at 06:04

## 2020-07-06 RX ADMIN — CHLORHEXIDINE GLUCONATE 1 APPLICATION(S): 213 SOLUTION TOPICAL at 07:05

## 2020-07-06 RX ADMIN — Medication 30 MILLIGRAM(S): at 17:02

## 2020-07-06 RX ADMIN — Medication 100000 UNIT(S): at 11:16

## 2020-07-06 RX ADMIN — TAMSULOSIN HYDROCHLORIDE 0.4 MILLIGRAM(S): 0.4 CAPSULE ORAL at 21:30

## 2020-07-06 RX ADMIN — MYCOPHENOLATE MOFETIL 1000 MILLIGRAM(S): 250 CAPSULE ORAL at 08:30

## 2020-07-06 RX ADMIN — Medication 20 MILLIGRAM(S): at 06:04

## 2020-07-06 RX ADMIN — CHLORHEXIDINE GLUCONATE 1 APPLICATION(S): 213 SOLUTION TOPICAL at 06:04

## 2020-07-06 RX ADMIN — TACROLIMUS 3 MILLIGRAM(S): 5 CAPSULE ORAL at 19:54

## 2020-07-06 RX ADMIN — HYDROMORPHONE HYDROCHLORIDE 0.5 MILLIGRAM(S): 2 INJECTION INTRAMUSCULAR; INTRAVENOUS; SUBCUTANEOUS at 03:12

## 2020-07-06 RX ADMIN — MEROPENEM 100 MILLIGRAM(S): 1 INJECTION INTRAVENOUS at 14:05

## 2020-07-06 RX ADMIN — Medication 50 GRAM(S): at 03:11

## 2020-07-06 RX ADMIN — INSULIN HUMAN 3 UNIT(S)/HR: 100 INJECTION, SOLUTION SUBCUTANEOUS at 22:16

## 2020-07-06 RX ADMIN — TACROLIMUS 3 MILLIGRAM(S): 5 CAPSULE ORAL at 10:39

## 2020-07-06 RX ADMIN — BASILIXIMAB 100 MILLIGRAM(S): 20 INJECTION, POWDER, FOR SOLUTION INTRAVENOUS at 05:58

## 2020-07-06 RX ADMIN — Medication 100000 UNIT(S): at 06:03

## 2020-07-06 RX ADMIN — MEROPENEM 100 MILLIGRAM(S): 1 INJECTION INTRAVENOUS at 06:04

## 2020-07-06 RX ADMIN — VALGANCICLOVIR 450 MILLIGRAM(S): 450 TABLET, FILM COATED ORAL at 12:01

## 2020-07-06 RX ADMIN — PANTOPRAZOLE SODIUM 40 MILLIGRAM(S): 20 TABLET, DELAYED RELEASE ORAL at 17:02

## 2020-07-06 RX ADMIN — NYSTATIN CREAM 1 APPLICATION(S): 100000 CREAM TOPICAL at 06:03

## 2020-07-06 RX ADMIN — Medication 100000 UNIT(S): at 23:57

## 2020-07-06 RX ADMIN — OXYCODONE HYDROCHLORIDE 10 MILLIGRAM(S): 5 TABLET ORAL at 01:07

## 2020-07-06 RX ADMIN — Medication 4: at 10:47

## 2020-07-06 RX ADMIN — INSULIN HUMAN 3 UNIT(S): 100 INJECTION, SOLUTION SUBCUTANEOUS at 22:15

## 2020-07-06 RX ADMIN — Medication 81 MILLIGRAM(S): at 11:17

## 2020-07-06 NOTE — PROGRESS NOTE ADULT - ASSESSMENT
Neurologic: hepatic encephalopathy.   - PRN dilaudid     Respiratory:   - No acute issues     Cardiovascular: Intermittent hypertension   - nicardipine Goal SBP , 145mmHg   - L. Radial A-line    Gastrointestinal/Nutrition: GIB, JEAN cirrhosis, GAVE syndrome. S/p liver transplant   - NPO/OGT   - Protonix 40mg. daily    Renal: urinary retention, hyponatremia  - IV locked     Heme: coagulopathy  - Venodynes for mechanical VTE prophylaxis, holding chemical VTE prophylaxis in the setting of coagulopathy/GIB    Infectious Disease: Emperic Coverage and prophyaxis in setting of immunosuppresion   - c/w meropenem -diabetic foot ulcer   - Conitnue PPx as per trasnplant: Bactrim, valcyte  - Continue immunosuppresion as per tranplant; solumedrol    Endo: DM type II, HLD  - Continue insulin gtt     Disposition:  - SICU full code     - Wander Aguirre PA-C

## 2020-07-06 NOTE — PROGRESS NOTE ADULT - SUBJECTIVE AND OBJECTIVE BOX
Transplant Surgery - Multidisciplinary Rounds  --------------------------------------------------------------  OLTx      Date:  7/2/2020       POD#4    Present: Patient seen with multidisciplinary team including Transplant Surgeon: Dr. Lucio.  Transplant Hepatologist Dr. Mo, hepatology fellow, Transplant Nephrologist: Dr.V Patel, ZBIGNIEW Hurley during am rounds and examined with Dr. Lucio. Disciplines not in attendance will be notified of the plan.     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     Re-admitted on 6/11:   ·	worsening HE  ·	UTI/VRE: tx with linezolid 6/15-22, bactrim DS 6/22 - 7/2  ·	MISA/Hyponatremia  ·	UGI bleed (melena) s/p EGD (6/22) c/w hemorrhagic duodenitis/gastritis; Grade 2 EV; requiring multiple transfusions  ·	Known h/o R foot ulcer (MRI neg for OM)  ·	s/p OLTx on 7/2/2020, post op liver doppler with patent vessels    OR details:  - L groin cutdown for vv bypass   - anastomosis: bicaval end-end/CBD duct-duct/HA & PV end-end, all standard anatomy.    - IOC with good flow  - Simulect induction. 500mg Solumedrol  - JPs x3 with T-Tube  - 14U pRBC, 14U FFP, 10 PLT, 11 CRYO, 500mL returned from cell saver, EBL 5L, UOP 1100mL    Recipient Info: ABO: A  CMV:  Neg  EBV Positive    Donor:  Donor ID:  [id number] Match: [id number]  Age: 29 ABO:  A1 High Risk:   No COD: Cardiovascular  Anti CMV positive, EBV IgG- positive  HepBcAb-neg, Hepatitis C-DANA- neg, Hepatitis C ab-neg    Interval events:  - POD 4 s/p OLT; good graft function, T bili 1.7   INR 1.28  - Diet advanced to regular and tolerated.  IVF DCd  - + Diarrhea with melena yesterday AM x2, none since (CDiff sample rejected from lab 2/2 too formed)  H/H stable 9.5/28 <- 9.8/28.0.   Protonix increased to BID  - ANDREINA #3 (medial) removed  - Drains: T tube (zero), JP1 (72cc SS), ANDREINA 2 (51cc SS)  - LE edema: Lasix 40mg IVP x 1 dose  - Noted to have UE tremors on exam.  FK held.  Level 7.3    Mag 1.8     - Nifedipine started for HTN    Potential discharge date: pending clinical improvement    Education: Medication    Plan of Care: See below      MEDICATIONS  (STANDING):  aspirin enteric coated 81 milliGRAM(s) Oral daily  chlorhexidine 2% Cloths 1 Application(s) Topical <User Schedule>  chlorhexidine 4% Liquid 1 Application(s) Topical <User Schedule>  collagenase Ointment 1 Application(s) Topical daily  insulin glargine Injectable (LANTUS) 15 Unit(s) SubCutaneous at bedtime  insulin lispro (HumaLOG) corrective regimen sliding scale   SubCutaneous three times a day before meals  insulin lispro (HumaLOG) corrective regimen sliding scale   SubCutaneous at bedtime  meropenem  IVPB 1000 milliGRAM(s) IV Intermittent every 8 hours  methylPREDNISolone sodium succinate Injectable 20 milliGRAM(s) IV Push two times a day  methylPREDNISolone sodium succinate Injectable   IV Push   mycophenolate mofetil 1000 milliGRAM(s) Oral <User Schedule>  NIFEdipine XL 30 milliGRAM(s) Oral daily  nystatin    Suspension 106832 Unit(s) Swish and Swallow four times a day  nystatin Cream 1 Application(s) Topical two times a day  pantoprazole  Injectable 40 milliGRAM(s) IV Push every 12 hours  tamsulosin 0.4 milliGRAM(s) Oral at bedtime  trimethoprim   80 mG/sulfamethoxazole 400 mG 1 Tablet(s) Oral daily  valGANciclovir 450 milliGRAM(s) Oral daily    MEDICATIONS  (PRN):  HYDROmorphone  Injectable 0.5 milliGRAM(s) IV Push every 3 hours PRN breakthrough  labetalol Injectable 10 milliGRAM(s) IV Push every 1 hour PRN systolic blood pressure GREATER THAN 170 mmHg or diastolic blood pressure GREATER THAN 100 mmHg  oxyCODONE    IR 5 milliGRAM(s) Oral every 4 hours PRN Moderate Pain (4 - 6)  oxyCODONE    IR 10 milliGRAM(s) Oral every 4 hours PRN Severe Pain (7 - 10)  sodium chloride 0.9% lock flush 10 milliLiter(s) IV Push every 1 hour PRN Pre/post blood products, medications, blood draw, and to maintain line patency      PAST MEDICAL & SURGICAL HISTORY:  GIB (gastrointestinal bleeding)  GERD with esophagitis: Gastritis &amp; Non Bleeding Ulcers  Hepatic encephalopathy  Obesity  Fatty liver disease, nonalcoholic  Renal stones: 25 years ago  Hypertension  Neuropathy  Hypercholesteremia  Diabetes  S/P cholecystectomy      Vital Signs Last 24 Hrs  T(C): 36.6 (06 Jul 2020 11:00), Max: 36.8 (05 Jul 2020 15:00)  T(F): 97.9 (06 Jul 2020 11:00), Max: 98.2 (05 Jul 2020 15:00)  HR: 78 (06 Jul 2020 11:00) (74 - 124)  BP: 119/60 (06 Jul 2020 11:00) (103/59 - 151/70)  BP(mean): 84 (06 Jul 2020 11:00) (78 - 100)  RR: 18 (06 Jul 2020 11:00) (11 - 26)  SpO2: 100% (06 Jul 2020 11:00) (90% - 100%)    I&O's Summary    05 Jul 2020 07:01  -  06 Jul 2020 07:00  --------------------------------------------------------  IN: 1430 mL / OUT: 2826 mL / NET: -1396 mL    06 Jul 2020 07:01  -  06 Jul 2020 11:57  --------------------------------------------------------  IN: 0 mL / OUT: 100 mL / NET: -100 mL                              9.5    7.03  )-----------( 90       ( 06 Jul 2020 02:04 )             28.6     07-06    132<L>  |  98  |  42<H>  ----------------------------<  224<H>  4.2   |  27  |  1.02    Ca    9.0      06 Jul 2020 02:05  Phos  2.6     07-06  Mg     1.8     07-06    TPro  5.1<L>  /  Alb  3.1<L>  /  TBili  1.7<H>  /  DBili  0.8<H>  /  AST  43<H>  /  ALT  162<H>  /  AlkPhos  78  07-06    Tacrolimus (), Serum: 7.3 ng/mL (07-05 @ 08:28)        Culture - Blood (collected 07-03-20 @ 11:00)  Source: .Blood Blood  Preliminary Report (07-04-20 @ 12:01):    No growth to date.    Culture - Blood (collected 07-03-20 @ 11:00)  Source: .Blood Blood  Preliminary Report (07-04-20 @ 12:01):    No growth to date.    Culture - Urine (collected 07-03-20 @ 10:11)  Source: .Urine Catheterized  Final Report (07-04-20 @ 07:24):    No growth    Culture - Surgical Swab (collected 07-03-20 @ 01:05)  Source: .Surgical Swab abdominal ascites culture  Preliminary Report (07-03-20 @ 18:12):    No growth    Culture - Fungal, Other (collected 07-03-20 @ 01:05)  Source: .Other Other  Preliminary Report (07-06-20 @ 07:35):    Testing in progress        Review of systems  Gen: fatigue/weakness  Skin: no rashes  Head/Eyes/Ears/Mouth: no headache, no changes in vision  Respiratory: no SOB  CV: no chest pain  GI: as in HPI  : voiding  MSK: + LE edema  Neuro:  no seizures, + tremulous  Heme: no easy bruising  Endo: no heat/cold intolerance  Psych: no significant nervousness, anxiety, stress  All other systems were reviewed and are negative, except as noted     PHYSICAL EXAM:  Constitutional: Well developed / well nourished  Eyes: mild icterus  ENMT: NC/AT  Neck: supple  Respiratory: CTA b/l  Cardiovascular: RRR  Gastrointestinal: Soft abdomen, mild distended; + incisional tenderness, incision c/d/i, staples intact, T-tube w/ no drainage.  JPs x2--all ss  Genitourinary: voiding spontaneously   Extremities: SCD's in place and working bilaterally. L groin cutdown site c/d/i  Vascular: Palpable dp pulses bilaterally  Neurological: AO x 2, upper extrem tremors  Skin: no rashes, ulcerations, lesions post-op  Psychiatric: awake, alert and responsive

## 2020-07-06 NOTE — PROGRESS NOTE ADULT - SUBJECTIVE AND OBJECTIVE BOX
HISTORY  64y Male c/b small esophageal varices (12/2019), portal hypertensive gastropathy, ascites, hx SBP, hx hepatic encephalopathy, GAVE syndrome s/p APC, hx duodenal ulcer (5/2019), HTN, HLD, DM, HFpEF (EF 70%), recent ESBL E coli prostate abscess (3/2020 s/p 4 weeks ertapenem), orthostatic hypotension ( on midodrine) who was initially admitted to medicine for hepatic encephalopathy. Hospital course c/b MISA, VRE UTI, acute blood loss anemia, received blood transfusion, EGD done 6/22 with Grade II esophageal varices, acute gastritis with hemorrhage and acute duodenitis with hemorrhage. Patient had worsening mental status, concerning decompensating liver cirrhosis, hepatic encephalopathy patient then transferred to SICU for further care.        24 HOUR EVENTS:  - Reg diet  - Nifedipine started  - Added Lantus 15 daily (increased BG in setting of steroids)--> back on insulin drip  - tacro held today  - C.diff sent due to multiple BM, negative (stool formed)     SUBJECTIVE/ROS:  [ ] A ten-point review of systems was otherwise negative except as noted.  [ ] Due to altered mental status/intubation, subjective information were not able to be obtained from the patient. History was obtained, to the extent possible, from review of the chart and collateral sources of information.      NEURO  Exam: awake, alert, oriented  Meds: HYDROmorphone  Injectable 0.5 milliGRAM(s) IV Push every 3 hours PRN breakthrough  oxyCODONE    IR 5 milliGRAM(s) Oral every 4 hours PRN Moderate Pain (4 - 6)  oxyCODONE    IR 10 milliGRAM(s) Oral every 4 hours PRN Severe Pain (7 - 10)    [x] Adequacy of sedation and pain control has been assessed and adjusted      RESPIRATORY  RR: 16 (07-06-20 @ 00:00) (11 - 28)  SpO2: 95% (07-06-20 @ 00:00) (64% - 100%)  Exam: unlabored, clear to auscultation bilaterally  Mechanical Ventilation:   ABG - ( 04 Jul 2020 08:45 )  pH: 7.39  /  pCO2: 49    /  pO2: 157   / HCO3: 29    / Base Excess: 3.7   /  SaO2: 99      Lactate: x      [N/A] Extubation Readiness Assessed  Meds: none        CARDIOVASCULAR  HR: 82 (07-06-20 @ 00:00) (80 - 91)  BP: 129/65 (07-06-20 @ 00:00) (125/76 - 151/70)  BP(mean): 91 (07-06-20 @ 00:00) (86 - 100)  ABP: 150/50 (07-05-20 @ 15:00) (113/40 - 170/67)  ABP(mean): 81 (07-05-20 @ 15:00) (64 - 103)  Exam: regular rate and rhythm  Cardiac Rhythm: sinus  Perfusion     [x]Adequate   [ ]Inadequate  Mentation   [x]Normal       [ ]Reduced  Extremities  [x]Warm         [ ]Cool  Volume Status [ ]Hypervolemic [x]Euvolemic [ ]Hypovolemic  Meds: labetalol Injectable 10 milliGRAM(s) IV Push every 1 hour PRN systolic blood pressure GREATER THAN 170 mmHg or diastolic blood pressure GREATER THAN 100 mmHg  NIFEdipine XL 60 milliGRAM(s) Oral daily  tamsulosin 0.4 milliGRAM(s) Oral at bedtime        GI/NUTRITION  Exam: soft, nontender, nondistended  Diet: Regular diet   Meds: pantoprazole  Injectable 40 milliGRAM(s) IV Push every 12 hours      GENITOURINARY  I&O's Detail    07-04 @ 07:01  -  07-05 @ 07:00  --------------------------------------------------------  IN:    dextrose 5% + sodium chloride 0.45%.: 750 mL    dextrose 5% + sodium chloride 0.45%.: 1350 mL    Enteral Tube Flush: 190 mL    insulin regular Infusion: 3 mL    insulin regular Infusion: 22.5 mL    niCARdipine Infusion: 112.5 mL    Oral Fluid: 2232 mL    Solution: 300 mL  Total IN: 4960 mL    OUT:    Drain: 50 mL    Drain: 51 mL    Drain: 59 mL    Indwelling Catheter - Urethral: 610 mL    Nasoenteral Tube: 80 mL    Voided: 700 mL  Total OUT: 1550 mL    Total NET: 3410 mL      07-05 @ 07:01  -  07-06 @ 00:21  --------------------------------------------------------  IN:    dextrose 5% + sodium chloride 0.45%.: 150 mL    insulin regular Infusion: 6.5 mL    insulin regular Infusion: 8.5 mL    Oral Fluid: 700 mL    Solution: 250 mL  Total IN: 1115 mL    OUT:    Drain: 45 mL    Drain: 52 mL    Drain: 3 mL    Voided: 2250 mL  Total OUT: 2350 mL    Total NET: -1235 mL          07-05    132<L>  |  99  |  46<H>  ----------------------------<  144<H>  4.3   |  22  |  1.12    Ca    9.6      05 Jul 2020 13:53  Phos  3.3     07-05  Mg     1.8     07-05    TPro  5.4<L>  /  Alb  3.2<L>  /  TBili  2.0<H>  /  DBili  0.9<H>  /  AST  59<H>  /  ALT  193<H>  /  AlkPhos  62  07-05    [ ] Beasley catheter, indication: N/A  Meds: sodium chloride 0.9% lock flush 10 milliLiter(s) IV Push every 1 hour PRN Pre/post blood products, medications, blood draw, and to maintain line patency        HEMATOLOGIC  Meds: none  [x] VTE Prophylaxis                        9.7    10.81 )-----------( 104      ( 05 Jul 2020 13:53 )             28.4     PT/INR - ( 05 Jul 2020 13:53 )   PT: 14.3 sec;   INR: 1.26 ratio         PTT - ( 05 Jul 2020 13:53 )  PTT:23.2 sec  Transfusion     [ ] PRBC   [ ] Platelets   [ ] FFP   [ ] Cryoprecipitate      INFECTIOUS DISEASES  WBC Count: 10.81 K/uL (07-05 @ 13:53)  WBC Count: 11.95 K/uL (07-05 @ 07:57)  WBC Count: 11.07 K/uL (07-05 @ 02:00)    RECENT CULTURES:  Specimen Source: .Blood Blood  Date/Time: 07-03 @ 11:00  Culture Results:   No growth to date.  Gram Stain: --  Organism: --  Specimen Source: .Urine Catheterized  Date/Time: 07-03 @ 10:11  Culture Results:   No growth  Gram Stain: --  Organism: --  Specimen Source: .Surgical Swab abdominal ascites culture  Date/Time: 07-03 @ 01:05  Culture Results:   No growth  Gram Stain: --  Organism: --    Meds: basiliximab  IVPB 20 milliGRAM(s) IV Intermittent once  meropenem  IVPB 1000 milliGRAM(s) IV Intermittent every 8 hours  mycophenolate mofetil 1000 milliGRAM(s) Oral <User Schedule>  nystatin    Suspension 371137 Unit(s) Swish and Swallow four times a day  trimethoprim   80 mG/sulfamethoxazole 400 mG 1 Tablet(s) Oral daily  valGANciclovir 450 milliGRAM(s) Oral daily        ENDOCRINE  CAPILLARY BLOOD GLUCOSE      POCT Blood Glucose.: 265 mg/dL (05 Jul 2020 23:53)  POCT Blood Glucose.: 269 mg/dL (05 Jul 2020 22:57)  POCT Blood Glucose.: 281 mg/dL (05 Jul 2020 20:52)  POCT Blood Glucose.: 284 mg/dL (05 Jul 2020 17:35)  POCT Blood Glucose.: 137 mg/dL (05 Jul 2020 13:05)  POCT Blood Glucose.: 170 mg/dL (05 Jul 2020 11:54)  POCT Blood Glucose.: 167 mg/dL (05 Jul 2020 11:14)  POCT Blood Glucose.: 159 mg/dL (05 Jul 2020 10:00)  POCT Blood Glucose.: 166 mg/dL (05 Jul 2020 09:15)  POCT Blood Glucose.: 142 mg/dL (05 Jul 2020 07:49)  POCT Blood Glucose.: 159 mg/dL (05 Jul 2020 06:53)  POCT Blood Glucose.: 144 mg/dL (05 Jul 2020 05:56)  POCT Blood Glucose.: 124 mg/dL (05 Jul 2020 05:15)  POCT Blood Glucose.: 119 mg/dL (05 Jul 2020 04:06)  POCT Blood Glucose.: 137 mg/dL (05 Jul 2020 02:56)  POCT Blood Glucose.: 150 mg/dL (05 Jul 2020 01:49)  POCT Blood Glucose.: 164 mg/dL (05 Jul 2020 00:53)    Meds: dextrose 50% Injectable 50 milliLiter(s) IV Push every 15 minutes  dextrose 50% Injectable 25 milliLiter(s) IV Push every 15 minutes  insulin glargine Injectable (LANTUS) 15 Unit(s) SubCutaneous at bedtime  insulin lispro (HumaLOG) corrective regimen sliding scale   SubCutaneous three times a day before meals  insulin lispro (HumaLOG) corrective regimen sliding scale   SubCutaneous at bedtime  insulin regular Infusion 2 Unit(s)/Hr IV Continuous <Continuous>  methylPREDNISolone sodium succinate Injectable 20 milliGRAM(s) IV Push two times a day  methylPREDNISolone sodium succinate Injectable   IV Push         ACCESS DEVICES:  [x] Peripheral IV  [ ] Central Venous Line	[ ] R	[ ] L	[ ] IJ	[ ] Fem	[ ] SC	Placed:   [ ] Arterial Line		[ ] R	[ ] L	[ ] Fem	[ ] Rad	[ ] Ax	Placed:   [ ] PICC:					[ ] Mediport  [ ] Urinary Catheter, Date Placed:   [x] Necessity of urinary, arterial, and venous catheters discussed    OTHER MEDICATIONS:  chlorhexidine 2% Cloths 1 Application(s) Topical <User Schedule>  chlorhexidine 4% Liquid 1 Application(s) Topical <User Schedule>  collagenase Ointment 1 Application(s) Topical daily  nystatin Cream 1 Application(s) Topical two times a day      CODE STATUS: full code       IMAGING: < from: CT Pelvis No Cont (06.12.20 @ 17:37) >  PROCEDURE:   CT of the Pelvis was performed without intravenous contrast.   Intravenous contrast: None.  Oral contrast: None.  Sagittal and coronal reformats were performed.    FINDINGS:  BLADDER: Within normal limits.  REPRODUCTIVE ORGANS: The prostate is normal in size with a few foci of calcifications. No discrete fluid collection is seen.  LYMPH NODES: No pelvic lymphadenopathy.    VISUALIZED PORTIONS:  ABDOMINAL ORGANS: 0.6 cm no obstructive calculus in the lower pole of the left kidney is unchanged.  BOWEL: Moderate to large amount of stool and rectum and colon. No small bowel obstruction.  PERITONEUM: No ascites.  VESSELS: Arterial calcifications.  ABDOMINAL WALL: Within normal limits.  BONES: Degenerative changes.    IMPRESSION:     Evaluation without intravenous contrast. No prostate abscess noted within the limitations of the exam.    < end of copied text >

## 2020-07-06 NOTE — PROGRESS NOTE ADULT - ASSESSMENT
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    [] s/p OLTx  - Good graft function, LFTs trending down, t bili down 1.7  - Drains: Remove ANDREINA #2 today. Monitor drain output JPs, T tube to gravity  - Diet: Regular diet  - Pain control PRN  - DVT PPx: SCDs at all times.  - ID: bld cxs from 7/3 NGTD, continue meropenem for now  - Continue Protonix 40mg IV bid    - ASA on hold in setting of recent UGI bleed  - Immunsuppression: Give Envarsus 3mg now and continue daily.  Daily Tacrolimus level. Keep mag >2.  Monitor for neurotoxicity, tremors.    - Immuno: FK by level, MMF 1/1, pred taper, Simulect induction   - PPX: valcyte/bactrim/nystatin  - Cont ICU care 64M with IDDM, HLD, obesity, HFpEF with mild LV diastolic dysfunction, MGUS, chronic anemia with a history of duodenal ulcer as well as GAVE and duodenal AVM s/p APC (last on 10/11/19), 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    [] s/p OLTx  - Good graft function, LFTs trending down, t bili down 1.7  - Drains: Remove ANDREINA #2 today. Monitor drain output JPs, T tube to gravity  - Diet: Regular diet  - Pain control PRN  - DVT PPx: SCDs at all times.  - ID: bld cxs from 7/3 NGTD, continue meropenem for now  - Continue Protonix 40mg IV bid    - ASA on hold in setting of recent UGI bleed  - Immunsuppression: Give Prograf 3mg now and continue daily BID.  MMF 1/1, pred taper.  Simulect induction.  Daily Tacrolimus level. Keep mag >2.  Monitor for neurotoxicity, tremors.   - PPX: valcyte/bactrim/nystatin  - Cont ICU care

## 2020-07-06 NOTE — CHART NOTE - NSCHARTNOTEFT_GEN_A_CORE
Nutrition Follow Up Note     Patient seen for: nutrition follow up on 8ICU s/p liver transplant 7/2.    Source: patient, medical record, communication with team.     Chart reviewed, events noted. "64 M hx of DM, HLD, GERD, HFpEF with mild LV diastolic dysfunction, and decompensated JEAN cirrhosis c/b ascites with hx of SBP, HE, duodenal ulcer, GAVE and duodenal AVM s/p APC (last on 10/11/19), UNOS listed for liver transplantation at [facility]. 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 He is now re-admitted with hepatic encephalopathy and VRE UTI, also with MISA on CKD. S/p liver transplant 7/2."    Diet Order: Diet, Consistent Carbohydrate w/Evening Snack (07-05-20 @ 10:55)    Nutrition Events:   - PO Intake: Pt reports excellent appetite, denies GI distress. Pt is consuming multiple Mighty Shake supplements (200 calories, 6 gm protein) daily.  - Per Transplant Team, good graft function.  - Pt noted with tremors; per Transplant Team, keep magnesium >2.  - Hyperglycemia in setting of steroid Rx, managed with Lantus (15 units bedtime) and Humalog corrective regimen  - Nutrition Education: Pt noted with fatigue, not amenable to post-transplant diet education at this time. Briefly reviewed Consistent Carbohydrate diet in setting of post-op hyperglycemia.     Last BM 7/5 (multiple). Bowel regimen: none. Per chart, "+Diarrhea with melena yesterday AM x2, none since (CDiff sample rejected from lab 2/2 too formed)"    Anthropometric Measurements:   Height (cm): 185 (07-01-20 @ 13:54), 185.42 (04-27-20 @ 14:53)  Weight (kg): 103 (07-01-20 @ 13:54), 117.9 (04-27-20 @ 14:53), 123.7 (07-06-20); weight gain likely reflects fluid shifts  BMI (kg/m2): 30.1 (07-01-20 @ 13:54), 34.3 (04-27-20 @ 14:53)  IBW: 83.4 kg    Medications: MEDICATIONS  (STANDING):  aspirin enteric coated 81 milliGRAM(s) Oral daily  chlorhexidine 2% Cloths 1 Application(s) Topical <User Schedule>  chlorhexidine 4% Liquid 1 Application(s) Topical <User Schedule>  collagenase Ointment 1 Application(s) Topical daily  insulin glargine Injectable (LANTUS) 15 Unit(s) SubCutaneous at bedtime  insulin lispro (HumaLOG) corrective regimen sliding scale   SubCutaneous three times a day before meals  insulin lispro (HumaLOG) corrective regimen sliding scale   SubCutaneous at bedtime  meropenem  IVPB 1000 milliGRAM(s) IV Intermittent every 8 hours  methylPREDNISolone sodium succinate Injectable 20 milliGRAM(s) IV Push two times a day  methylPREDNISolone sodium succinate Injectable   IV Push   mycophenolate mofetil 1000 milliGRAM(s) Oral <User Schedule>  NIFEdipine XL 30 milliGRAM(s) Oral daily  nystatin    Suspension 670903 Unit(s) Swish and Swallow four times a day  nystatin Cream 1 Application(s) Topical two times a day  pantoprazole  Injectable 40 milliGRAM(s) IV Push every 12 hours  tamsulosin 0.4 milliGRAM(s) Oral at bedtime  trimethoprim   80 mG/sulfamethoxazole 400 mG 1 Tablet(s) Oral daily  valGANciclovir 450 milliGRAM(s) Oral daily    MEDICATIONS  (PRN):  HYDROmorphone  Injectable 0.5 milliGRAM(s) IV Push every 3 hours PRN breakthrough  labetalol Injectable 10 milliGRAM(s) IV Push every 1 hour PRN systolic blood pressure GREATER THAN 170 mmHg or diastolic blood pressure GREATER THAN 100 mmHg  oxyCODONE    IR 5 milliGRAM(s) Oral every 4 hours PRN Moderate Pain (4 - 6)  oxyCODONE    IR 10 milliGRAM(s) Oral every 4 hours PRN Severe Pain (7 - 10)  sodium chloride 0.9% lock flush 10 milliLiter(s) IV Push every 1 hour PRN Pre/post blood products, medications, blood draw, and to maintain line patency    Labs: 07-06 @ 13:06: Sodium 134<L>, Potassium 4.3, Calcium 9.3, Magnesium 2.2, Phosphorus 2.3<L>, BUN 40<H>, Creatinine 1.00, Glucose 162<H>, Alk Phos 80, ALT/SGPT 154<H>, AST/SGOT 45<H>, Albumin 3.2<L>, Total Bilirubin 1.8<H>, Hemoglobin 10.1<L>, Hematocrit 29.6<L>, Creatine Kinase <<27>  07-06 @ 02:05: Sodium 132<L>, Potassium 4.2, Calcium 9.0, Magnesium 1.8, Phosphorus 2.6, BUN 42<H>, Creatinine 1.02, Glucose 224<H>, Alk Phos 78, ALT/SGPT 162<H>, AST/SGOT 43<H>, Albumin 3.1<L>, Total Bilirubin 1.7<H>, Creatine Kinase <<27>  07-06 @ 02:04: Hemoglobin 9.5<L>, Hematocrit 28.6<L>, Creatine Kinase <<27>    HbA1c 5.2%    POCT Blood Glucose.: 213 mg/dL (07-06-20 @ 10:40)  POCT Blood Glucose.: 120 mg/dL (07-06-20 @ 06:50)  POCT Blood Glucose.: 121 mg/dL (07-06-20 @ 06:08)  POCT Blood Glucose.: 154 mg/dL (07-06-20 @ 05:02)  POCT Blood Glucose.: 184 mg/dL (07-06-20 @ 04:04)  POCT Blood Glucose.: 197 mg/dL (07-06-20 @ 03:06)  POCT Blood Glucose.: 211 mg/dL (07-06-20 @ 01:57)  POCT Blood Glucose.: 238 mg/dL (07-06-20 @ 00:56)  POCT Blood Glucose.: 265 mg/dL (07-05-20 @ 23:53)  POCT Blood Glucose.: 269 mg/dL (07-05-20 @ 22:57)  POCT Blood Glucose.: 281 mg/dL (07-05-20 @ 20:52)  POCT Blood Glucose.: 284 mg/dL (07-05-20 @ 17:35)    Skin per nursing documentation: no pressure injuries documented  Edema: 2+ head, lef/right leg, left/right arm    Estimated Needs: based on IBW 83.4 kg   Energy: 0300-1509 calories  (25-30 fahad/kg)  Protein: 100-117 gm (1.2-1.4 gm/kg)    Previous Nutrition Diagnosis: Increased Nutrient Needs  Nutrition Diagnosis is: ongoing, addressed with therapeutic diet and oral supplements    New Nutrition Diagnosis:  none    Recommended Interventions:   1. Continue Consistent Carbohydrate diet with snack  2. Continue Nectar Consistency Mighty Shake (200 calories, 6 Gm protein) supplements  3. RD will reinforce post-transplant nutrition therapy and Consistent Carbohydrate diet education when pt is rested and receptive.      Monitoring and Evaluation:   Continue to monitor nutrition provision and tolerance, weights, labs, skin integrity.   RD remains available upon request and will follow up per protocol.    Myah Argueta MS RD CDN [facility]; Pager # [phone]

## 2020-07-06 NOTE — PROGRESS NOTE ADULT - ASSESSMENT
64 M hx of DM, HLD, GERD, HFpEF with mild LV diastolic dysfunction, and decompensated JEAN cirrhosis c/b ascites with hx of SBP, HE, duodenal ulcer, GAVE and duodenal AVM s/p APC (last on 10/11/19), UNOS listed for liver transplantation at [facility]. 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 He is now re-admitted with hepatic encephalopathy and VRE UTI, also with MISA on CKD. S/p liver transplant 7/2.    Impression:  #S/p liver transplant 7/2:  OR details:  - L groin cutdown for vv bypass   - anastomosis: bicaval end-end/CBD duct-duct/HA & PV end-end, all standard anatomy.    - IOC with good flow  - Simulect induction. 500mg Solumedrol  - JPs x3 with T-Tube  - 14U pRBC, 14U FFP, 10 PLT, 11 CRYO, 500mL returned from cell saver, EBL 5L, UOP 1100mL  Recipient Info:   ABO: A  CMV:  Neg  EBV Positive  Donor:  Donor ID:  [id number] Match: [id number]  Age: 29 ABO:  A1 High Risk:   No COD: Cardiovascular  Anti CMV positive, EBV IgG- positive  HepBcAb-neg, Hepatitis C-DANA- neg, Hepatitis C ab-neg    #GI bleed - pretransplant with acute blood loss anemia s/p 10U PRBCs, bleeding from known GAVE, Hb stable post-transplant, but reported melena 7/5 AM. Hgb now stable. GAVE and PHG should have improved w/ liver transplant, most likely etiology is NG tube trauma prior if any c/f GI bleed  #Tremors – possible tacro induced neurotoxicity along with hypomagnesemia, readjusting dose. May need medication change based on clinical course  # Hypertension on nifedipine now  # Lower ext edema: diuresing, on lasix  #R posterior heel wound w/ overlying eschar – on meropenem for 1 more day. Local wound care. no signs of infections, XR neg for gas, evaluated by podiatry and ID. MRI w/o contrast limited, but no overt signs of active infection.  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

## 2020-07-06 NOTE — PROGRESS NOTE ADULT - ATTENDING COMMENTS
63 yo M with HLD, IDDM, GERD, HFpEF with mild LV diastolic dysfunction, chronic right foot ulcer, recurrent UTIs, and decompensated JEAN cirrhosis, s/p OLT 7/2 (standard criteria donor, standard vascular anastomoses with duct-to-duct biliary anastomosis over T-tube, CMV D+/R-, EBV D+/R+, received Simulect induction).    Hepatic allograft is good, with improving liver enzymes. Continuing immunosuppression with corticosteroid taper, MMF, and tacrolimus as per protocol, but if he continues to have symptoms of tacrolimus toxicity despite therapeutic trough level then may need to switch to cyclosporine. Continue prophylaxis with nystatin, Valcyte, and Bactrim.    Chronic right foot ulcer does not appear infected, and anticipate completing antibiotics tomorrow as per multidisciplinary discussion today.    Renal function is stable, and continuing with diuresis.    Family (son Prashant Segundo) was updated this afternoon at bedside.    Please don't hesitate to call with any questions/concerns.    Letitia Mo M.D., Ph.D.  Transplant Hepatology  Cell: [phone]

## 2020-07-06 NOTE — PROGRESS NOTE ADULT - SUBJECTIVE AND OBJECTIVE BOX
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 The patient was re-admitted with hepatic encephalopathy and VRE UTI, also with MISA on CKD. S/p liver transplant 7/2. The patient was found to have VRE UTI, acute blood loss anemia, received blood transfusion, EGD done 6/22 showed grade II esophageal varices, acute gastritis with hemorrhage and acute duodenitis. Patient was noted to have worsening mental status, concerning decompensating liver cirrhosis, hepatic encephalopathy patient then transferred to SICU s/p intubation and extubation. The patient denies chest pain, dizziness, palpitations, abdominal pain, nausea, vomiting, diarrhea or constipation.     6/14/20: blood cultures positive for VRE - resistant to vancomycin R > 16       Allergies: Codeine (anaphylaxis)     Intolerances    ANTIMICROBIALS:    meropenem  IVPB 1000 every 8 hours  nystatin Suspension [id number] four times a day  trimethoprim   80 mG/sulfamethoxazole 400 mG 1 daily  valGANciclovir 450 daily      OTHER MEDS:  aspirin enteric coated 81 milliGRAM(s) Oral daily  chlorhexidine 2% Cloths 1 Application(s) Topical <User Schedule>  chlorhexidine 4% Liquid 1 Application(s) Topical <User Schedule>  collagenase Ointment 1 Application(s) Topical daily  HYDROmorphone  Injectable 0.5 milliGRAM(s) IV Push every 3 hours PRN  insulin glargine Injectable (LANTUS) 15 Unit(s) SubCutaneous at bedtime  insulin lispro (HumaLOG) corrective regimen sliding scale   SubCutaneous three times a day before meals  insulin lispro (HumaLOG) corrective regimen sliding scale   SubCutaneous at bedtime  labetalol Injectable 10 milliGRAM(s) IV Push every 1 hour PRN  methylPREDNISolone sodium succinate Injectable 20 milliGRAM(s) IV Push two times a day  methylPREDNISolone sodium succinate Injectable   IV Push   mycophenolate mofetil 1000 milliGRAM(s) Oral <User Schedule>  NIFEdipine XL 30 milliGRAM(s) Oral daily  nystatin Cream 1 Application(s) Topical two times a day  oxyCODONE    IR 5 milliGRAM(s) Oral every 4 hours PRN  oxyCODONE    IR 10 milliGRAM(s) Oral every 4 hours PRN  pantoprazole  Injectable 40 milliGRAM(s) IV Push every 12 hours  sodium chloride 0.9% lock flush 10 milliLiter(s) IV Push every 1 hour PRN  tamsulosin 0.4 milliGRAM(s) Oral at bedtime      Vital Signs Last 24 Hrs  T(C): 36.6 (06 Jul 2020 11:00), Max: 36.8 (05 Jul 2020 15:00)  T(F): 97.9 (06 Jul 2020 11:00), Max: 98.2 (05 Jul 2020 15:00)  HR: 78 (06 Jul 2020 11:00) (74 - 124)  BP: 119/60 (06 Jul 2020 11:00) (103/59 - 151/70)  BP(mean): 84 (06 Jul 2020 11:00) (78 - 100)  RR: 18 (06 Jul 2020 11:00) (11 - 26)  SpO2: 100% (06 Jul 2020 11:00) (90% - 100%)    EXAM:  Constitutional: Not in acute distress  Eyes: No icterus. Pupils b/l reactive to light.  Oral cavity: Clear, no lesions  Neck: No neck vein distension noted  RS: Chest clear to auscultation bilaterally. No wheeze/rhonchi/crepitations.  CVS: S1, S2 heard. Regular rate and rhythm. No murmurs/rubs/gallops.  Abdomen: Soft. No guarding/rigidity/tenderness.  : No acute abnormalities  Extremities: Warm. No pedal edema  Skin: No lesions noted  Vascular: No evidence of phlebitis  Neuro: Alert, oriented to time/place/person                        9.5    7.03  )-----------( 90       ( 06 Jul 2020 02:04 )             28.6       07-06    132<L>  |  98  |  42<H>  ----------------------------<  224<H>  4.2   |  27  |  1.02    Ca    9.0      06 Jul 2020 02:05  Phos  2.6     07-06  Mg     1.8     07-06    TPro  5.1<L>  /  Alb  3.1<L>  /  TBili  1.7<H>  /  DBili  0.8<H>  /  AST  43<H>  /  ALT  162<H>  /  AlkPhos  78  07-06    MICROBIOLOGY:Culture Results:   No growth to date. (07-03 @ 11:00)  Culture Results:   No growth to date. (07-03 @ 11:00)  Culture Results:   No growth (07-03 @ 10:11)  Culture Results:   Testing in progress (07-03 @ 01:05)  Culture Results:   No growth (07-03 @ 01:05)      RADIOLOGY:    6/18/20:  MRI right ankle with and without contrast: Evaluation for soft tissue infection is limited without intravenous contrast. Although there is heterogeneous marrow signal there is no skin ulceration, sinus tract or abscess identified with adjacent marrow signal abnormality suggest osteomyelitis.  There is edema within the abductor hallucis and flexor digitorum brevis muscles. There is minimal edema and marked atrophy within the adductor digit minimi muscle. These findings are likely related to denervation edema. The tendons at the ankle and hindfoot are intact. There is mild flexor hallucis longus tenosynovitis. There is mild spurring at the Achilles insertion. There is marked thickening at the middle cord of the plantar fascia which contains a small ossicle which is chronic. There is also focal thickening at the proximal lateral cortex of the plantar fascia likely related to old injury.   There is marked spurring dorsally at the second tarsometatarsal joint. There is edema within the sinus Tarsi with scarring.   The Lisfranc ligament is at the edge of the field although the interosseous ligament is grossly intact. The dorsal component is markedly thickened and chronically degenerated or torn. The distal syndesmotic ligaments are intact. The lateral collateral ligaments are intact. There is thickening at the deltoid ligament which is continuous.  Impression: Evaluation for soft tissue infection is limited without intravenous contrast. Heterogeneous marrow signal without an adjacent skin ulceration or fluid collection to suggest osteomyelitis.    OTHER INVESTIGATIONS:    Assessment and plan:    1) Liver transplant recipient  Continue bactrim, valcyte for prophylaxis     2) Pancytopenia  Monitor CBC daily     C) Right foot ulcer   No erythema or purulence    Wound care daily  Continue intravenous meropenem   Podiatry on the case     D) GIB - s/p EGD with Acute gastritis and duodenitis with hemorrhage  GI on board   Monitor daily CBC   Rest of management as per primary and transplant team       Charlie Lafleur MD PGY-4   Fellow, Infectious Diseases  Pager: [phone]  If no response, after 5pm and on Weekends: Call [phone] Follow Up:      Interval History/ROS: Patient is a 64 year old male with past medical history of insulin dependent diabtes mellitus, hyperlipidemia, obesity, heart failure with preserved ejection fraction with mild LV diastolic dysfunction, MGUS, chronic anemia, duodenal ulcer, GAVE,  duodenal AVM s/p APC (last on 10/11/19) who presented with 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 The patient was re-admitted with hepatic encephalopathy and VRE UTI, also with MISA on CKD. S/p liver transplant 7/2. The patient was found to have VRE UTI, acute blood loss anemia, received blood transfusion, EGD done 6/22 showed grade II esophageal varices, acute gastritis with hemorrhage and acute duodenitis. Patient was noted to have worsening mental status, concerning decompensating liver cirrhosis, hepatic encephalopathy patient then transferred to SICU s/p intubation and extubation. The patient denies chest pain, dizziness, palpitations, abdominal pain, nausea, vomiting, diarrhea or constipation.     6/14/20: blood cultures positive for VRE - resistant to vancomycin R > 16       Allergies: Codeine (anaphylaxis)     Intolerances    ANTIMICROBIALS:    meropenem  IVPB 1000 every 8 hours  nystatin Suspension [id number] four times a day  trimethoprim   80 mG/sulfamethoxazole 400 mG 1 daily  valGANciclovir 450 daily      OTHER MEDS:  aspirin enteric coated 81 milliGRAM(s) Oral daily  chlorhexidine 2% Cloths 1 Application(s) Topical <User Schedule>  chlorhexidine 4% Liquid 1 Application(s) Topical <User Schedule>  collagenase Ointment 1 Application(s) Topical daily  HYDROmorphone  Injectable 0.5 milliGRAM(s) IV Push every 3 hours PRN  insulin glargine Injectable (LANTUS) 15 Unit(s) SubCutaneous at bedtime  insulin lispro (HumaLOG) corrective regimen sliding scale   SubCutaneous three times a day before meals  insulin lispro (HumaLOG) corrective regimen sliding scale   SubCutaneous at bedtime  labetalol Injectable 10 milliGRAM(s) IV Push every 1 hour PRN  methylPREDNISolone sodium succinate Injectable 20 milliGRAM(s) IV Push two times a day  methylPREDNISolone sodium succinate Injectable   IV Push   mycophenolate mofetil 1000 milliGRAM(s) Oral <User Schedule>  NIFEdipine XL 30 milliGRAM(s) Oral daily  nystatin Cream 1 Application(s) Topical two times a day  oxyCODONE    IR 5 milliGRAM(s) Oral every 4 hours PRN  oxyCODONE    IR 10 milliGRAM(s) Oral every 4 hours PRN  pantoprazole  Injectable 40 milliGRAM(s) IV Push every 12 hours  sodium chloride 0.9% lock flush 10 milliLiter(s) IV Push every 1 hour PRN  tamsulosin 0.4 milliGRAM(s) Oral at bedtime      Vital Signs Last 24 Hrs  T(C): 36.6 (06 Jul 2020 11:00), Max: 36.8 (05 Jul 2020 15:00)  T(F): 97.9 (06 Jul 2020 11:00), Max: 98.2 (05 Jul 2020 15:00)  HR: 78 (06 Jul 2020 11:00) (74 - 124)  BP: 119/60 (06 Jul 2020 11:00) (103/59 - 151/70)  BP(mean): 84 (06 Jul 2020 11:00) (78 - 100)  RR: 18 (06 Jul 2020 11:00) (11 - 26)  SpO2: 100% (06 Jul 2020 11:00) (90% - 100%)    EXAM:  Constitutional: Not in acute distress  Eyes: No icterus. Pupils b/l reactive to light.  Oral cavity: Clear, no lesions  Neck: No neck vein distension noted  RS: Chest clear to auscultation bilaterally. No wheeze/rhonchi/crepitations.  CVS: S1, S2 heard. Regular rate and rhythm. No murmurs/rubs/gallops.  Abdomen: Soft. No guarding/rigidity/tenderness.  : No acute abnormalities  Extremities: Warm. No pedal edema  Skin: No lesions noted  Vascular: No evidence of phlebitis  Neuro: Alert, oriented to time/place/person                        9.5    7.03  )-----------( 90       ( 06 Jul 2020 02:04 )             28.6       07-06    132<L>  |  98  |  42<H>  ----------------------------<  224<H>  4.2   |  27  |  1.02    Ca    9.0      06 Jul 2020 02:05  Phos  2.6     07-06  Mg     1.8     07-06    TPro  5.1<L>  /  Alb  3.1<L>  /  TBili  1.7<H>  /  DBili  0.8<H>  /  AST  43<H>  /  ALT  162<H>  /  AlkPhos  78  07-06    MICROBIOLOGY:Culture Results:   No growth to date. (07-03 @ 11:00)  Culture Results:   No growth to date. (07-03 @ 11:00)  Culture Results:   No growth (07-03 @ 10:11)  Culture Results:   Testing in progress (07-03 @ 01:05)  Culture Results:   No growth (07-03 @ 01:05)      RADIOLOGY:    6/18/20:  MRI right ankle with and without contrast: Evaluation for soft tissue infection is limited without intravenous contrast. Although there is heterogeneous marrow signal there is no skin ulceration, sinus tract or abscess identified with adjacent marrow signal abnormality suggest osteomyelitis.  There is edema within the abductor hallucis and flexor digitorum brevis muscles. There is minimal edema and marked atrophy within the adductor digit minimi muscle. These findings are likely related to denervation edema. The tendons at the ankle and hindfoot are intact. There is mild flexor hallucis longus tenosynovitis. There is mild spurring at the Achilles insertion. There is marked thickening at the middle cord of the plantar fascia which contains a small ossicle which is chronic. There is also focal thickening at the proximal lateral cortex of the plantar fascia likely related to old injury.   There is marked spurring dorsally at the second tarsometatarsal joint. There is edema within the sinus Tarsi with scarring.   The Lisfranc ligament is at the edge of the field although the interosseous ligament is grossly intact. The dorsal component is markedly thickened and chronically degenerated or torn. The distal syndesmotic ligaments are intact. The lateral collateral ligaments are intact. There is thickening at the deltoid ligament which is continuous.  Impression: Evaluation for soft tissue infection is limited without intravenous contrast. Heterogeneous marrow signal without an adjacent skin ulceration or fluid collection to suggest osteomyelitis.    OTHER INVESTIGATIONS:    Assessment and plan:    1) Liver transplant recipient  Continue bactrim, valcyte for prophylaxis     2) Pancytopenia  Monitor CBC daily     C) Right foot ulcer   No erythema or purulence    Wound care daily  Continue intravenous meropenem   Podiatry on the case     D) GIB - s/p EGD with Acute gastritis and duodenitis with hemorrhage  GI on board   Monitor daily CBC   Rest of management as per primary and transplant team       Charlie Lafleur MD PGY-4   Fellow, Infectious Diseases  Pager: [phone]  If no response, after 5pm and on Weekends: Call [phone] Follow Up:      Interval History/ROS: Patient is a 64 year old male with past medical history of insulin dependent diabtes mellitus, hyperlipidemia, obesity, heart failure with preserved ejection fraction with mild LV diastolic dysfunction, MGUS, chronic anemia, duodenal ulcer, GAVE,  duodenal AVM s/p APC (last on 10/11/19) who presented with 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 The patient was re-admitted with hepatic encephalopathy and VRE UTI, also with MISA on CKD. S/p liver transplant 7/2. The patient was found to have VRE UTI, acute blood loss anemia, received blood transfusion, EGD done 6/22 showed grade II esophageal varices, acute gastritis with hemorrhage and acute duodenitis. Patient was noted to have worsening mental status, concerning decompensating liver cirrhosis, hepatic encephalopathy patient then transferred to SICU s/p intubation and extubation. The patient denies chest pain, dizziness, palpitations, abdominal pain, nausea, vomiting, diarrhea or constipation.     6/14/20: blood cultures positive for VRE - resistant to vancomycin R > 16       Allergies: Codeine (anaphylaxis)     Intolerances    ANTIMICROBIALS:    meropenem  IVPB 1000 every 8 hours  nystatin Suspension [id number] four times a day  trimethoprim   80 mG/sulfamethoxazole 400 mG 1 daily  valGANciclovir 450 daily      OTHER MEDS:  aspirin enteric coated 81 milliGRAM(s) Oral daily  chlorhexidine 2% Cloths 1 Application(s) Topical <User Schedule>  chlorhexidine 4% Liquid 1 Application(s) Topical <User Schedule>  collagenase Ointment 1 Application(s) Topical daily  HYDROmorphone  Injectable 0.5 milliGRAM(s) IV Push every 3 hours PRN  insulin glargine Injectable (LANTUS) 15 Unit(s) SubCutaneous at bedtime  insulin lispro (HumaLOG) corrective regimen sliding scale   SubCutaneous three times a day before meals  insulin lispro (HumaLOG) corrective regimen sliding scale   SubCutaneous at bedtime  labetalol Injectable 10 milliGRAM(s) IV Push every 1 hour PRN  methylPREDNISolone sodium succinate Injectable 20 milliGRAM(s) IV Push two times a day  methylPREDNISolone sodium succinate Injectable   IV Push   mycophenolate mofetil 1000 milliGRAM(s) Oral <User Schedule>  NIFEdipine XL 30 milliGRAM(s) Oral daily  nystatin Cream 1 Application(s) Topical two times a day  oxyCODONE    IR 5 milliGRAM(s) Oral every 4 hours PRN  oxyCODONE    IR 10 milliGRAM(s) Oral every 4 hours PRN  pantoprazole  Injectable 40 milliGRAM(s) IV Push every 12 hours  sodium chloride 0.9% lock flush 10 milliLiter(s) IV Push every 1 hour PRN  tamsulosin 0.4 milliGRAM(s) Oral at bedtime      Vital Signs Last 24 Hrs  T(C): 36.6 (06 Jul 2020 11:00), Max: 36.8 (05 Jul 2020 15:00)  T(F): 97.9 (06 Jul 2020 11:00), Max: 98.2 (05 Jul 2020 15:00)  HR: 78 (06 Jul 2020 11:00) (74 - 124)  BP: 119/60 (06 Jul 2020 11:00) (103/59 - 151/70)  BP(mean): 84 (06 Jul 2020 11:00) (78 - 100)  RR: 18 (06 Jul 2020 11:00) (11 - 26)  SpO2: 100% (06 Jul 2020 11:00) (90% - 100%)    EXAM:  Constitutional: Not in acute distress  Eyes: No icterus. Pupils b/l reactive to light.  Oral cavity: Clear, no lesions  Neck: No neck vein distension noted  RS: Chest clear to auscultation bilaterally. No wheeze/rhonchi/crepitations.  CVS: S1, S2 heard. Regular rate and rhythm. No murmurs/rubs/gallops.  Abdomen: Soft. No guarding/rigidity/tenderness.  : No acute abnormalities  Extremities: Warm. No pedal edema  Skin: No lesions noted  Vascular: No evidence of phlebitis  Neuro: Alert, oriented to time/place/person                        9.5    7.03  )-----------( 90       ( 06 Jul 2020 02:04 )             28.6       07-06    132<L>  |  98  |  42<H>  ----------------------------<  224<H>  4.2   |  27  |  1.02    Ca    9.0      06 Jul 2020 02:05  Phos  2.6     07-06  Mg     1.8     07-06    TPro  5.1<L>  /  Alb  3.1<L>  /  TBili  1.7<H>  /  DBili  0.8<H>  /  AST  43<H>  /  ALT  162<H>  /  AlkPhos  78  07-06    MICROBIOLOGY:Culture Results:   No growth to date. (07-03 @ 11:00)  Culture Results:   No growth to date. (07-03 @ 11:00)  Culture Results:   No growth (07-03 @ 10:11)  Culture Results:   Testing in progress (07-03 @ 01:05)  Culture Results:   No growth (07-03 @ 01:05)      RADIOLOGY:    6/18/20:  MRI right ankle with and without contrast: Evaluation for soft tissue infection is limited without intravenous contrast. Although there is heterogeneous marrow signal there is no skin ulceration, sinus tract or abscess identified with adjacent marrow signal abnormality suggest osteomyelitis.  There is edema within the abductor hallucis and flexor digitorum brevis muscles. There is minimal edema and marked atrophy within the adductor digit minimi muscle. These findings are likely related to denervation edema. The tendons at the ankle and hindfoot are intact. There is mild flexor hallucis longus tenosynovitis. There is mild spurring at the Achilles insertion. There is marked thickening at the middle cord of the plantar fascia which contains a small ossicle which is chronic. There is also focal thickening at the proximal lateral cortex of the plantar fascia likely related to old injury.   There is marked spurring dorsally at the second tarsometatarsal joint. There is edema within the sinus Tarsi with scarring.   The Lisfranc ligament is at the edge of the field although the interosseous ligament is grossly intact. The dorsal component is markedly thickened and chronically degenerated or torn. The distal syndesmotic ligaments are intact. The lateral collateral ligaments are intact. There is thickening at the deltoid ligament which is continuous.  Impression: Evaluation for soft tissue infection is limited without intravenous contrast. Heterogeneous marrow signal without an adjacent skin ulceration or fluid collection to suggest osteomyelitis.    OTHER INVESTIGATIONS:    Assessment and plan:    1) Liver transplant recipient  Continue bactrim, valcyte for prophylaxis     2) Pancytopenia  Monitor CBC daily     C) Right foot ulcer   Discontinue meropenem   No erythema or purulence    Wound care daily  Podiatry on board     D) GIB - s/p EGD with Acute gastritis and duodenitis with hemorrhage  GI on board   Monitor daily CBC   Rest of management as per primary and transplant team       Charlie Lafleur MD PGY-4   Fellow, Infectious Diseases  Pager: [phone]  If no response, after 5pm and on Weekends: Call [phone] Follow Up:      Interval History/ROS: Patient is a 64 year old male with past medical history of insulin dependent diabtes mellitus, hyperlipidemia, obesity, heart failure with preserved ejection fraction with mild LV diastolic dysfunction, MGUS, chronic anemia, duodenal ulcer, GAVE,  duodenal AVM s/p APC (last on 10/11/19) who presented with 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 The patient was re-admitted with hepatic encephalopathy and VRE UTI, also with MISA on CKD. S/p liver transplant 7/2. The patient was found to have VRE UTI, acute blood loss anemia, received blood transfusion, EGD done 6/22 showed grade II esophageal varices, acute gastritis with hemorrhage and acute duodenitis. Patient was noted to have worsening mental status, concerning decompensating liver cirrhosis, hepatic encephalopathy patient then transferred to SICU s/p intubation and extubation. The patient denies chest pain, dizziness, palpitations, abdominal pain, nausea, vomiting, diarrhea or constipation.     6/14/20: blood cultures positive for VRE - resistant to vancomycin R > 16       Allergies: Codeine (anaphylaxis)     Intolerances    ANTIMICROBIALS:    meropenem  IVPB 1000 every 8 hours  nystatin Suspension [id number] four times a day  trimethoprim   80 mG/sulfamethoxazole 400 mG 1 daily  valGANciclovir 450 daily      OTHER MEDS:  aspirin enteric coated 81 milliGRAM(s) Oral daily  chlorhexidine 2% Cloths 1 Application(s) Topical <User Schedule>  chlorhexidine 4% Liquid 1 Application(s) Topical <User Schedule>  collagenase Ointment 1 Application(s) Topical daily  HYDROmorphone  Injectable 0.5 milliGRAM(s) IV Push every 3 hours PRN  insulin glargine Injectable (LANTUS) 15 Unit(s) SubCutaneous at bedtime  insulin lispro (HumaLOG) corrective regimen sliding scale   SubCutaneous three times a day before meals  insulin lispro (HumaLOG) corrective regimen sliding scale   SubCutaneous at bedtime  labetalol Injectable 10 milliGRAM(s) IV Push every 1 hour PRN  methylPREDNISolone sodium succinate Injectable 20 milliGRAM(s) IV Push two times a day  methylPREDNISolone sodium succinate Injectable   IV Push   mycophenolate mofetil 1000 milliGRAM(s) Oral <User Schedule>  NIFEdipine XL 30 milliGRAM(s) Oral daily  nystatin Cream 1 Application(s) Topical two times a day  oxyCODONE    IR 5 milliGRAM(s) Oral every 4 hours PRN  oxyCODONE    IR 10 milliGRAM(s) Oral every 4 hours PRN  pantoprazole  Injectable 40 milliGRAM(s) IV Push every 12 hours  sodium chloride 0.9% lock flush 10 milliLiter(s) IV Push every 1 hour PRN  tamsulosin 0.4 milliGRAM(s) Oral at bedtime      Vital Signs Last 24 Hrs  T(C): 36.6 (06 Jul 2020 11:00), Max: 36.8 (05 Jul 2020 15:00)  T(F): 97.9 (06 Jul 2020 11:00), Max: 98.2 (05 Jul 2020 15:00)  HR: 78 (06 Jul 2020 11:00) (74 - 124)  BP: 119/60 (06 Jul 2020 11:00) (103/59 - 151/70)  BP(mean): 84 (06 Jul 2020 11:00) (78 - 100)  RR: 18 (06 Jul 2020 11:00) (11 - 26)  SpO2: 100% (06 Jul 2020 11:00) (90% - 100%)    EXAM:  Constitutional: Not in acute distress.   Eyes: No icterus. Pupils b/l reactive to light.  Oral cavity: Clear, no lesions  Neck: No neck vein distension noted  RS: Chest clear to auscultation bilaterally. No wheeze/rhonchi/crepitations.  CVS: S1, S2 heard. Regular rate and rhythm. No murmurs/rubs/gallops.  Abdomen: Soft. No guarding/rigidity/tenderness.  : No acute abnormalities  Extremities: no erythema, purulence, or edema in right ankle   Skin: No lesions noted  Vascular: No evidence of phlebitis  Neuro: Alert, oriented to time/place/person                        9.5    7.03  )-----------( 90       ( 06 Jul 2020 02:04 )             28.6       07-06    132<L>  |  98  |  42<H>  ----------------------------<  224<H>  4.2   |  27  |  1.02    Ca    9.0      06 Jul 2020 02:05  Phos  2.6     07-06  Mg     1.8     07-06    TPro  5.1<L>  /  Alb  3.1<L>  /  TBili  1.7<H>  /  DBili  0.8<H>  /  AST  43<H>  /  ALT  162<H>  /  AlkPhos  78  07-06    MICROBIOLOGY:Culture Results:   No growth to date. (07-03 @ 11:00)  Culture Results:   No growth to date. (07-03 @ 11:00)  Culture Results:   No growth (07-03 @ 10:11)  Culture Results:   Testing in progress (07-03 @ 01:05)  Culture Results:   No growth (07-03 @ 01:05)      RADIOLOGY:    6/18/20:  MRI right ankle with and without contrast: Evaluation for soft tissue infection is limited without intravenous contrast. Although there is heterogeneous marrow signal there is no skin ulceration, sinus tract or abscess identified with adjacent marrow signal abnormality suggest osteomyelitis.  There is edema within the abductor hallucis and flexor digitorum brevis muscles. There is minimal edema and marked atrophy within the adductor digit minimi muscle. These findings are likely related to denervation edema. The tendons at the ankle and hindfoot are intact. There is mild flexor hallucis longus tenosynovitis. There is mild spurring at the Achilles insertion. There is marked thickening at the middle cord of the plantar fascia which contains a small ossicle which is chronic. There is also focal thickening at the proximal lateral cortex of the plantar fascia likely related to old injury.   There is marked spurring dorsally at the second tarsometatarsal joint. There is edema within the sinus Tarsi with scarring.   The Lisfranc ligament is at the edge of the field although the interosseous ligament is grossly intact. The dorsal component is markedly thickened and chronically degenerated or torn. The distal syndesmotic ligaments are intact. The lateral collateral ligaments are intact. There is thickening at the deltoid ligament which is continuous.  Impression: Evaluation for soft tissue infection is limited without intravenous contrast. Heterogeneous marrow signal without an adjacent skin ulceration or fluid collection to suggest osteomyelitis.    OTHER INVESTIGATIONS:    Assessment and plan:    1) Liver transplant recipient  Continue bactrim, valcyte for prophylaxis     2) Pancytopenia  Monitor CBC daily     C) Right foot ulcer   Discontinue meropenem   No erythema or purulence    Wound care daily  Podiatry on board     D) GIB - s/p EGD with Acute gastritis and duodenitis with hemorrhage  GI on board   Monitor daily CBC   Rest of management as per primary and transplant team       Charlie Lafleur MD PGY-4   Fellow, Infectious Diseases  Pager: [phone]  If no response, after 5pm and on Weekends: Call [phone] Follow Up:      Interval History/ROS: Patient is a 64 year old male with past medical history of insulin dependent diabtes mellitus, hyperlipidemia, obesity, heart failure with preserved ejection fraction with mild LV diastolic dysfunction, MGUS, chronic anemia, duodenal ulcer, GAVE,  duodenal AVM s/p APC (last on 10/11/19) who presented with 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 The patient was re-admitted with hepatic encephalopathy and VRE UTI, also with MISA on CKD. S/p liver transplant 7/2. The patient was found to have VRE UTI, acute blood loss anemia, received blood transfusion, EGD done 6/22 showed grade II esophageal varices, acute gastritis with hemorrhage and acute duodenitis. Patient was noted to have worsening mental status, concerning decompensating liver cirrhosis, hepatic encephalopathy patient then transferred to SICU s/p intubation and extubation. The patient denies chest pain, dizziness, palpitations, abdominal pain, nausea, vomiting, diarrhea or constipation.     6/14/20: blood cultures positive for VRE - resistant to vancomycin R > 16       Allergies: Codeine (anaphylaxis)     Intolerances    ANTIMICROBIALS:    meropenem  IVPB 1000 every 8 hours  nystatin Suspension [id number] four times a day  trimethoprim   80 mG/sulfamethoxazole 400 mG 1 daily  valGANciclovir 450 daily      OTHER MEDS:  aspirin enteric coated 81 milliGRAM(s) Oral daily  chlorhexidine 2% Cloths 1 Application(s) Topical <User Schedule>  chlorhexidine 4% Liquid 1 Application(s) Topical <User Schedule>  collagenase Ointment 1 Application(s) Topical daily  HYDROmorphone  Injectable 0.5 milliGRAM(s) IV Push every 3 hours PRN  insulin glargine Injectable (LANTUS) 15 Unit(s) SubCutaneous at bedtime  insulin lispro (HumaLOG) corrective regimen sliding scale   SubCutaneous three times a day before meals  insulin lispro (HumaLOG) corrective regimen sliding scale   SubCutaneous at bedtime  labetalol Injectable 10 milliGRAM(s) IV Push every 1 hour PRN  methylPREDNISolone sodium succinate Injectable 20 milliGRAM(s) IV Push two times a day  methylPREDNISolone sodium succinate Injectable   IV Push   mycophenolate mofetil 1000 milliGRAM(s) Oral <User Schedule>  NIFEdipine XL 30 milliGRAM(s) Oral daily  nystatin Cream 1 Application(s) Topical two times a day  oxyCODONE    IR 5 milliGRAM(s) Oral every 4 hours PRN  oxyCODONE    IR 10 milliGRAM(s) Oral every 4 hours PRN  pantoprazole  Injectable 40 milliGRAM(s) IV Push every 12 hours  sodium chloride 0.9% lock flush 10 milliLiter(s) IV Push every 1 hour PRN  tamsulosin 0.4 milliGRAM(s) Oral at bedtime      Vital Signs Last 24 Hrs  T(C): 36.6 (06 Jul 2020 11:00), Max: 36.8 (05 Jul 2020 15:00)  T(F): 97.9 (06 Jul 2020 11:00), Max: 98.2 (05 Jul 2020 15:00)  HR: 78 (06 Jul 2020 11:00) (74 - 124)  BP: 119/60 (06 Jul 2020 11:00) (103/59 - 151/70)  BP(mean): 84 (06 Jul 2020 11:00) (78 - 100)  RR: 18 (06 Jul 2020 11:00) (11 - 26)  SpO2: 100% (06 Jul 2020 11:00) (90% - 100%)    EXAM:  Constitutional: Not in acute distress.   Eyes: No icterus. Pupils b/l reactive to light.  Oral cavity: Clear, no lesions  Neck: No neck vein distension noted  RS: Chest clear to auscultation bilaterally. No wheeze/rhonchi/crepitations.  CVS: S1, S2 heard. Regular rate and rhythm. No murmurs/rubs/gallops.  Abdomen: Soft. No guarding/rigidity/tenderness.  : No acute abnormalities  Extremities: no erythema, purulence, or edema in right ankle   Skin: No lesions noted  Vascular: No evidence of phlebitis  Neuro: Alert, oriented to time/place/person                        9.5    7.03  )-----------( 90       ( 06 Jul 2020 02:04 )             28.6       07-06    132<L>  |  98  |  42<H>  ----------------------------<  224<H>  4.2   |  27  |  1.02    Ca    9.0      06 Jul 2020 02:05  Phos  2.6     07-06  Mg     1.8     07-06    TPro  5.1<L>  /  Alb  3.1<L>  /  TBili  1.7<H>  /  DBili  0.8<H>  /  AST  43<H>  /  ALT  162<H>  /  AlkPhos  78  07-06    MICROBIOLOGY:Culture Results:   No growth to date. (07-03 @ 11:00)  Culture Results:   No growth to date. (07-03 @ 11:00)  Culture Results:   No growth (07-03 @ 10:11)  Culture Results:   Testing in progress (07-03 @ 01:05)  Culture Results:   No growth (07-03 @ 01:05)      RADIOLOGY:    6/18/20:  MRI right ankle with and without contrast: Evaluation for soft tissue infection is limited without intravenous contrast. Although there is heterogeneous marrow signal there is no skin ulceration, sinus tract or abscess identified with adjacent marrow signal abnormality suggest osteomyelitis.  There is edema within the abductor hallucis and flexor digitorum brevis muscles. There is minimal edema and marked atrophy within the adductor digit minimi muscle. These findings are likely related to denervation edema. The tendons at the ankle and hindfoot are intact. There is mild flexor hallucis longus tenosynovitis. There is mild spurring at the Achilles insertion. There is marked thickening at the middle cord of the plantar fascia which contains a small ossicle which is chronic. There is also focal thickening at the proximal lateral cortex of the plantar fascia likely related to old injury.   There is marked spurring dorsally at the second tarsometatarsal joint. There is edema within the sinus Tarsi with scarring.   The Lisfranc ligament is at the edge of the field although the interosseous ligament is grossly intact. The dorsal component is markedly thickened and chronically degenerated or torn. The distal syndesmotic ligaments are intact. The lateral collateral ligaments are intact. There is thickening at the deltoid ligament which is continuous.  Impression: Evaluation for soft tissue infection is limited without intravenous contrast. Heterogeneous marrow signal without an adjacent skin ulceration or fluid collection to suggest osteomyelitis.    OTHER INVESTIGATIONS:

## 2020-07-06 NOTE — PROGRESS NOTE ADULT - SUBJECTIVE AND OBJECTIVE BOX
Chief Complaint:  Patient is a 64y old  Male who presents with a chief complaint of Liver failure, hepatic encephalopathy (2020 11:48)      Interval Events: Overnight had lower oxygen sat now on 2L. Receiving diuretics. Mild tremors but are persistent, improved from prior. Patient is hungry. Denies n/v.    Allergies:  codeine (Anaphylaxis)      Hospital Medications:  aspirin enteric coated 81 milliGRAM(s) Oral daily  chlorhexidine 2% Cloths 1 Application(s) Topical <User Schedule>  chlorhexidine 4% Liquid 1 Application(s) Topical <User Schedule>  collagenase Ointment 1 Application(s) Topical daily  HYDROmorphone  Injectable 0.5 milliGRAM(s) IV Push every 3 hours PRN  insulin glargine Injectable (LANTUS) 15 Unit(s) SubCutaneous at bedtime  insulin lispro (HumaLOG) corrective regimen sliding scale   SubCutaneous three times a day before meals  insulin lispro (HumaLOG) corrective regimen sliding scale   SubCutaneous at bedtime  labetalol Injectable 10 milliGRAM(s) IV Push every 1 hour PRN  meropenem  IVPB 1000 milliGRAM(s) IV Intermittent every 8 hours  methylPREDNISolone sodium succinate Injectable 20 milliGRAM(s) IV Push two times a day  methylPREDNISolone sodium succinate Injectable   IV Push   mycophenolate mofetil 1000 milliGRAM(s) Oral <User Schedule>  NIFEdipine XL 30 milliGRAM(s) Oral daily  nystatin    Suspension [id number] Unit(s) Swish and Swallow four times a day  nystatin Cream 1 Application(s) Topical two times a day  oxyCODONE    IR 5 milliGRAM(s) Oral every 4 hours PRN  oxyCODONE    IR 10 milliGRAM(s) Oral every 4 hours PRN  pantoprazole  Injectable 40 milliGRAM(s) IV Push every 12 hours  sodium chloride 0.9% lock flush 10 milliLiter(s) IV Push every 1 hour PRN  tamsulosin 0.4 milliGRAM(s) Oral at bedtime  trimethoprim   80 mG/sulfamethoxazole 400 mG 1 Tablet(s) Oral daily  valGANciclovir 450 milliGRAM(s) Oral daily      PMHX/PSHX:  GIB (gastrointestinal bleeding)  GERD with esophagitis  Hepatic encephalopathy  Obesity  Fatty liver disease, nonalcoholic  Renal stones  Hypertension  Neuropathy  Hypercholesteremia  Diabetes  S/P cholecystectomy  No significant past surgical history      Family history:  Family history of type 2 diabetes mellitus  Family history of hypertension  Family history of stomach cancer  No pertinent family history in first degree relatives      ROS:     General:  No wt loss, fevers, chills, night sweats, fatigue,   Eyes:  Good vision, no reported pain  ENT:  No sore throat, pain, runny nose, dysphagia  CV:  No pain, palpitations, hypo/hypertension  Resp:  No dyspnea, cough, tachypnea, wheezing  GI:  See HPI  :  No pain, bleeding, incontinence, nocturia  Muscle:  No pain, weakness  Neuro:  No weakness, tingling, memory problems  Psych:  No fatigue, insomnia, mood problems, depression  Endocrine:  No polyuria, polydipsia, cold/heat intolerance  Heme:  No petechiae, ecchymosis, easy bruisability  Skin:  No rash, edema      PHYSICAL EXAM:     Vital Signs:  Vital Signs Last 24 Hrs  T(C): 36.6 (2020 11:00), Max: 36.8 (2020 15:00)  T(F): 97.9 (2020 11:00), Max: 98.2 (2020 15:00)  HR: 79 (2020 12:00) (74 - 124)  BP: 117/56 (2020 12:00) (103/59 - 151/70)  BP(mean): 80 (2020 12:00) (78 - 100)  RR: 16 (2020 12:00) (11 - 26)  SpO2: 100% (2020 12:00) (90% - 100%)  Daily     Daily Weight in k.7 (2020 05:12)    GENERAL: no acute distress  NEURO: alert, no asterixis, +intention tremor  HEENT: anicteric sclera, no conjunctival pallor appreciated  CHEST: no respiratory distress, no accessory muscle use  CARDIAC: regular rate, rhythm  ABDOMEN: soft, incisions intact, ANDREINA drains in place w/ serosanguinous drainage, T-tube w/o drainage  EXTREMITIES: warm, well perfused, 1+edema  SKIN: R heel with small open wound without purulent drainage    LABS:                        10.1   6.16  )-----------( 87       ( 2020 13:06 )             29.6     07-06    134<L>  |  98  |  40<H>  ----------------------------<  162<H>  4.3   |  35<H>  |  1.00    Ca    9.3      2020 13:06  Phos  2.3     07-06  Mg     2.2     07-06    TPro  5.3<L>  /  Alb  3.2<L>  /  TBili  1.8<H>  /  DBili  0.9<H>  /  AST  45<H>  /  ALT  154<H>  /  AlkPhos  80  07-06    LIVER FUNCTIONS - ( 2020 13:06 )  Alb: 3.2 g/dL / Pro: 5.3 g/dL / ALK PHOS: 80 U/L / ALT: 154 U/L / AST: 45 U/L / GGT: x           PT/INR - ( 2020 02:05 )   PT: 14.6 sec;   INR: 1.28 ratio         PTT - ( 2020 02:05 )  PTT:25.1 sec    Amylase Serum--      Lipase serum--       [id number]      Imaging:

## 2020-07-06 NOTE — PROGRESS NOTE ADULT - ASSESSMENT
Assessment and plan:    1) Liver transplant recipient  Continue bactrim, valcyte for prophylaxis     2) Pancytopenia  Monitor CBC daily     C) Right foot ulcer   Discontinue meropenem   No erythema or purulence    Wound care daily  Podiatry on board     D) GIB - s/p EGD with Acute gastritis and duodenitis with hemorrhage  GI on board   Monitor daily CBC   Rest of management as per primary and transplant team       Charlie Lafleur MD PGY-4   Fellow, Infectious Diseases  Pager: [phone]  If no response, after 5pm and on Weekends: Call [phone]

## 2020-07-06 NOTE — PROGRESS NOTE ADULT - ATTENDING COMMENTS
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     CT Pelvis with no evidence of abscess (but noncontrast)  BCx 6/12 with NGTD  UCx 6/14 with VRE  Mental status improved after initiation of linezolid  s/p 7 day empiric coverage for VRE    Noted to have purulent drainage from the right heel eschar by transplant surgery  No obvious drainage at time of my assessment  Podiatry without concerns for infection on reevaluation  MRI of foot without obvious infection or OM    Hypotension on 6/21 and drop in H/H  Blood Cultures (6/21) with NGTD  UCx (6/21) with <10K organisms  EGD on 6/22 with Acute gastritis with hemorrhage and Acute duodenitis with hemorrhage.    Had leukocytosis and encephalopathy which could have been related to hemorrhage   U/A (6/24) with 9 WBC  BCx (6/24) with NGTD     Had pancytopenia and required Filgrastim on 6/30  s/p OLT on 7/2/20    Noted to have some drainage from right heel so danilo-op Dapto and Lachelle were extended  No evidence of skin/soft tissue infection on exam today  No drainage, erythema or underlying fluctuance was appreciated at the right heel  Favor discontinuing antibiotics at this time.     I will continue to follow. Please feel free to contact me with any further questions.    Eusebio Pérez M.D.  [facility] Division of Infectious Disease  8AM-5PM: Pager Number [phone]  After Hours (or if no response): Please contact the Infectious Diseases Office at [phone]     The above assessment and plan were discussed with Xiao Avery (Transplant Surgery NP)

## 2020-07-06 NOTE — CONSULT NOTE ADULT - SUBJECTIVE AND OBJECTIVE BOX
Physical Medicine & Rehabilitation Initial Consult:  06-11  Patient is a 64y old  Male who presents with a chief complaint of Liver failure, hepatic encephalopathy (06 Jul 2020 11:48)    HPI:  63 yo M PMHx of decompensated insulin dependent diabtes mellitus, hyperlipidemia, obesity, heart failure with preserved ejection fraction with mild LV diastolic dysfunction, MGUS, chronic anemia, duodenal ulcer, GAVE, duodenal AVM s/p APC (last on 10/11/19) presented to [facility] on 6/11/20 with 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 The patient was re-admitted with hepatic encephalopathy and VRE UTI, also with MISA on CKD. S/p liver transplant 7/2. The patient was found to have VRE UTI, acute blood loss anemia, received blood transfusion, EGD done 6/22 showed grade II esophageal varices, acute gastritis with hemorrhage and acute duodenitis. Patient was noted to have worsening mental status, concerning decompensating liver cirrhosis, hepatic encephalopathy patient then transferred to SICU s/p intubation and extubation. The patient denies chest pain, dizziness, palpitations, abdominal pain, nausea, vomiting, diarrhea or constipation.     REVIEW OF SYSTEMS  Constitutional - No fever, No weight loss, No fatigue  HEENT - No eye pain, No visual disturbances, No difficulty hearing, No tinnitus, No vertigo, No neck pain  Respiratory - No cough, No wheezing, No shortness of breath  Cardiovascular - No chest pain, No palpitations  Gastrointestinal - No abdominal pain, No nausea, No vomiting, No diarrhea, No constipation  Genitourinary - No dysuria, No frequency, No hematuria, No incontinence  Neurological - No headaches, No memory loss, No loss of strength, No numbness, No tremors  Skin - No itching, No rashes, No lesions   Endocrine - No temperature intolerance  Musculoskeletal - No joint pain, No joint swelling, No muscle pain  Psychiatric - No depression, No anxiety    PAST MEDICAL & SURGICAL HISTORY  GIB (gastrointestinal bleeding)  GERD with esophagitis  Hepatic encephalopathy  Obesity  Fatty liver disease, nonalcoholic  Renal stones  Hypertension  Neuropathy  Hypercholesteremia  Diabetes  S/P cholecystectomy  No significant past surgical history    SOCIAL HISTORY  Smoking - Denied  EtOH - Denied   Drugs - Denied    FUNCTIONAL HISTORY  Pt lives with wife in a pvt home, ramp to enter and one level inside. Pt has a tub shower and shower chair, utilizing WC PTA.    CURRENT FUNCTIONAL STATUS  PT 7/5:  Bed Mobility: Max Assist x2  Pt tolerated sitting EOB x10min Mod A regressing Max Ax1 with fatigue, poor trunk control.Weight shift b/l and anterior/ posterior x20 reps, Mod Ax1.Seated knee ext and ankle pumps 10x 2 reps with cues to complete full range.    OT 7/4:  Bed Mobility: Mod A  Transfers: Mod A  LBD: Max A     FAMILY HISTORY   Family history of type 2 diabetes mellitus  Family history of hypertension  Family history of stomach cancer  No pertinent family history in first degree relatives    ALLERGIES  codeine (Anaphylaxis)    ----------------------------------------------------------------------------------------  T(C): 36.6 (07-06-20 @ 11:00), Max: 36.8 (07-05-20 @ 15:00)  HR: 79 (07-06-20 @ 12:00) (74 - 124)  BP: 117/56 (07-06-20 @ 12:00) (103/59 - 151/70)  RR: 16 (07-06-20 @ 12:00) (11 - 26)  SpO2: 100% (07-06-20 @ 12:00) (90% - 100%)    PHYSICAL EXAM  Constitutional: NAD, Comfortable  HEENT: NCAT, EOMI  Neck: Supple, No limited ROM  Chest: Breathing comfortably, No wheezing  Cardiovascular: S1S2   Abdomen: Soft   Extremities: No C/C/E, No calf tenderness   Neurologic Exam:                    Cognitive: Awake, Alert, AAO to self, place, date, year, situation     Communication: Fluent, No dysarthria     Cranial Nerves: CN 2-12 intact     Motor:                    LEFT    UE - ShAB 5/5, EF 5/5, EE 5/5, WE 5/5,  5/5                    RIGHT UE - ShAB 5/5, EF 5/5, EE 5/5, WE 5/5,  5/5                    LEFT    LE - HF 5/5, KE 5/5, DF 5/5, PF 5/5                    RIGHT LE - HF 5/5, KE 5/5, DF 5/5, PF 5/5        Sensory: Intact to LT     Reflexes: DTR Intact, No primitive reflex     Coordination: FTN intact     OculoVestibular: No saccades, No nystagmus, VOR         Balance: WNL Static  Psychiatric: Mood stable, Affect WNL  ----------------------------------------------------------------------------------------  Labs/Imaging:                        10.1   6.16  )-----------( 87       ( 06 Jul 2020 13:06 )             29.6   07-06    132<L>  |  98  |  42<H>  ----------------------------<  224<H>  4.2   |  27  |  1.02    Ca    9.0      06 Jul 2020 02:05  Phos  2.6     07-06  Mg     1.8     07-06    TPro  5.1<L>  /  Alb  3.1<L>  /  TBili  1.7<H>  /  DBili  0.8<H>  /  AST  43<H>  /  ALT  162<H>  /  AlkPhos  78  07-06    Xray Chest 1 View- PORTABLE-Routine (07.06.20 @ 07:06)   INTERPRETATION:   Heart size and the mediastinum cannot be accurately evaluated on this projection.  Left IJ sheath and catheter are unchanged in position with tip in the left brachiocephalic vein.  Previous ET tube and enteric tube not seen.  Thoracic aorta is calcified.  The visualized lungs are clear. No pleural effusion or pneumothorax is seen.    ----------------------------------------------------------------------------------------  Medications:  aspirin enteric coated 81 milliGRAM(s) Oral daily  chlorhexidine 2% Cloths 1 Application(s) Topical <User Schedule>  chlorhexidine 4% Liquid 1 Application(s) Topical <User Schedule>  collagenase Ointment 1 Application(s) Topical daily  HYDROmorphone  Injectable 0.5 milliGRAM(s) IV Push every 3 hours PRN  insulin glargine Injectable (LANTUS) 15 Unit(s) SubCutaneous at bedtime  insulin lispro (HumaLOG) corrective regimen sliding scale   SubCutaneous three times a day before meals  insulin lispro (HumaLOG) corrective regimen sliding scale   SubCutaneous at bedtime  labetalol Injectable 10 milliGRAM(s) IV Push every 1 hour PRN  meropenem  IVPB 1000 milliGRAM(s) IV Intermittent every 8 hours  methylPREDNISolone sodium succinate Injectable 20 milliGRAM(s) IV Push two times a day  methylPREDNISolone sodium succinate Injectable   IV Push   mycophenolate mofetil 1000 milliGRAM(s) Oral <User Schedule>  NIFEdipine XL 30 milliGRAM(s) Oral daily  nystatin    Suspension 709168 Unit(s) Swish and Swallow four times a day  nystatin Cream 1 Application(s) Topical two times a day  oxyCODONE    IR 5 milliGRAM(s) Oral every 4 hours PRN  oxyCODONE    IR 10 milliGRAM(s) Oral every 4 hours PRN  pantoprazole  Injectable 40 milliGRAM(s) IV Push every 12 hours  sodium chloride 0.9% lock flush 10 milliLiter(s) IV Push every 1 hour PRN  tamsulosin 0.4 milliGRAM(s) Oral at bedtime  trimethoprim   80 mG/sulfamethoxazole 400 mG 1 Tablet(s) Oral daily  valGANciclovir 450 milliGRAM(s) Oral daily    ----------------------------------------------------------------------------------------  ASSESSMENT/PLAN  64y Male with functional deficits after complicated and prolonged hospital course due to decompensated cirrhosis now s/p liver transplant 7/2.   s/p Liver transplant: Valgancyclovir, Bactrim ppx, cellcept, methypred,   Right foot ulcer: Meropenem, Collagenase, podiatry and ID following  Pancytopenia: Daily monitoring  HTN: Nifedipine XL 30mg, Labetalol PRN  IDDM: Lantus/Lispro  Pain: Tylenol, oxycodone PRN, Dilaudid PRN  Acute gastritis and duodenitis with hemorrhage: IV PPI, GI following  DVT PPX: SCDs    *** Incomplete, please wait for final recommendations and attending cosign *** Physical Medicine & Rehabilitation Initial Consult:  06-11  Patient is a 64y old  Male who presents with a chief complaint of Liver failure, hepatic encephalopathy (06 Jul 2020 11:48)    HPI:  63 yo M PMHx of decompensated insulin dependent diabtes mellitus, hyperlipidemia, obesity, heart failure with preserved ejection fraction with mild LV diastolic dysfunction, MGUS, chronic anemia, duodenal ulcer, GAVE, duodenal AVM s/p APC (last on 10/11/19) presented to [facility] on 6/11/20 with 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 The patient was re-admitted with hepatic encephalopathy and VRE UTI, also with MISA on CKD. S/p liver transplant 7/2. The patient was found to have VRE UTI, acute blood loss anemia, received blood transfusion, EGD done 6/22 showed grade II esophageal varices, acute gastritis with hemorrhage and acute duodenitis. Patient was noted to have worsening mental status, concerning decompensating liver cirrhosis, hepatic encephalopathy patient then transferred to SICU s/p intubation and extubation. The patient denies chest pain, dizziness, palpitations, abdominal pain, nausea, vomiting, diarrhea or constipation.     REVIEW OF SYSTEMS  Constitutional - No fever, No weight loss, + fatigue  HEENT - No eye pain, No visual disturbances, No difficulty hearing, No tinnitus, No vertigo, No neck pain  Respiratory - No cough, No wheezing, No shortness of breath  Cardiovascular - No chest pain, No palpitations  Gastrointestinal - No abdominal pain, No nausea, No vomiting, No diarrhea, No constipation  Genitourinary - No dysuria, No frequency, No hematuria, No incontinence  Neurological - No headaches, No memory loss, No loss of strength, No numbness, + tremors  Skin - No itching, No rashes, No lesions   Endocrine - No temperature intolerance  Musculoskeletal - No joint pain, No joint swelling, No muscle pain  Psychiatric - No depression, No anxiety    PAST MEDICAL & SURGICAL HISTORY  GIB (gastrointestinal bleeding)  GERD with esophagitis  Hepatic encephalopathy  Obesity  Fatty liver disease, nonalcoholic  Renal stones  Hypertension  Neuropathy  Hypercholesteremia  Diabetes  S/P cholecystectomy  No significant past surgical history    SOCIAL HISTORY  Smoking - Denied  EtOH - Denied   Drugs - Denied    FUNCTIONAL HISTORY  Pt lives with wife in a pvt home, ramp to enter and one level inside. Pt has a tub shower and shower chair, utilizing WC PTA and RW for short distances indoors.    CURRENT FUNCTIONAL STATUS  PT 7/5:  Bed Mobility: Max Assist x2  Pt tolerated sitting EOB x10min Mod A regressing Max Ax1 with fatigue, poor trunk control.Weight shift b/l and anterior/ posterior x20 reps, Mod Ax1.Seated knee ext and ankle pumps 10x 2 reps with cues to complete full range.    OT 7/4:  Bed Mobility: Mod A  Transfers: Mod A  LBD: Max A     FAMILY HISTORY   Family history of type 2 diabetes mellitus  Family history of hypertension  Family history of stomach cancer  No pertinent family history in first degree relatives    ALLERGIES  codeine (Anaphylaxis)    ----------------------------------------------------------------------------------------  T(C): 36.6 (07-06-20 @ 11:00), Max: 36.8 (07-05-20 @ 15:00)  HR: 79 (07-06-20 @ 12:00) (74 - 124)  BP: 117/56 (07-06-20 @ 12:00) (103/59 - 151/70)  RR: 16 (07-06-20 @ 12:00) (11 - 26)  SpO2: 100% (07-06-20 @ 12:00) (90% - 100%)    PHYSICAL EXAM  Constitutional: NAD, Comfortable  HEENT: NCAT, EOMI  Neck: Supple, No limited ROM  Chest: Breathing comfortably, No wheezing  Cardiovascular: S1S2   Abdomen: Soft   Extremities: No C/C/E, No calf tenderness   Neurologic Exam:                    Cognitive: Awake, Alert, AAO to self, place, date, year, situation     Communication: Fluent, No dysarthria     Cranial Nerves: CN 2-12 intact     Tremor: + generalized tremor of entire trunk and arms at rest and with movement.      Motor:                    LEFT    UE - ShAB 5/5, EF 5/5, EE 5/5, WE 5/5,  5/5                    RIGHT UE - ShAB 5/5, EF 5/5, EE 5/5, WE 5/5,  5/5                    LEFT    LE - HF 3/5, KE 3/5, DF 4/5, PF 4/5                    RIGHT LE - HF 4/5, KE 4/5, DF 4/5, PF 4/5        Sensory: Intact to LT     Reflexes: DTR Intact, No primitive reflex     Coordination: FTN intact     Psychiatric: Mood stable, Affect WNL  ----------------------------------------------------------------------------------------  Labs/Imaging:                        10.1   6.16  )-----------( 87       ( 06 Jul 2020 13:06 )             29.6   07-06    132<L>  |  98  |  42<H>  ----------------------------<  224<H>  4.2   |  27  |  1.02    Ca    9.0      06 Jul 2020 02:05  Phos  2.6     07-06  Mg     1.8     07-06    TPro  5.1<L>  /  Alb  3.1<L>  /  TBili  1.7<H>  /  DBili  0.8<H>  /  AST  43<H>  /  ALT  162<H>  /  AlkPhos  78  07-06    Xray Chest 1 View- PORTABLE-Routine (07.06.20 @ 07:06)   INTERPRETATION:   Heart size and the mediastinum cannot be accurately evaluated on this projection.  Left IJ sheath and catheter are unchanged in position with tip in the left brachiocephalic vein.  Previous ET tube and enteric tube not seen.  Thoracic aorta is calcified.  The visualized lungs are clear. No pleural effusion or pneumothorax is seen.    ----------------------------------------------------------------------------------------  Medications:  aspirin enteric coated 81 milliGRAM(s) Oral daily  chlorhexidine 2% Cloths 1 Application(s) Topical <User Schedule>  chlorhexidine 4% Liquid 1 Application(s) Topical <User Schedule>  collagenase Ointment 1 Application(s) Topical daily  HYDROmorphone  Injectable 0.5 milliGRAM(s) IV Push every 3 hours PRN  insulin glargine Injectable (LANTUS) 15 Unit(s) SubCutaneous at bedtime  insulin lispro (HumaLOG) corrective regimen sliding scale   SubCutaneous three times a day before meals  insulin lispro (HumaLOG) corrective regimen sliding scale   SubCutaneous at bedtime  labetalol Injectable 10 milliGRAM(s) IV Push every 1 hour PRN  meropenem  IVPB 1000 milliGRAM(s) IV Intermittent every 8 hours  methylPREDNISolone sodium succinate Injectable 20 milliGRAM(s) IV Push two times a day  methylPREDNISolone sodium succinate Injectable   IV Push   mycophenolate mofetil 1000 milliGRAM(s) Oral <User Schedule>  NIFEdipine XL 30 milliGRAM(s) Oral daily  nystatin    Suspension 304310 Unit(s) Swish and Swallow four times a day  nystatin Cream 1 Application(s) Topical two times a day  oxyCODONE    IR 5 milliGRAM(s) Oral every 4 hours PRN  oxyCODONE    IR 10 milliGRAM(s) Oral every 4 hours PRN  pantoprazole  Injectable 40 milliGRAM(s) IV Push every 12 hours  sodium chloride 0.9% lock flush 10 milliLiter(s) IV Push every 1 hour PRN  tamsulosin 0.4 milliGRAM(s) Oral at bedtime  trimethoprim   80 mG/sulfamethoxazole 400 mG 1 Tablet(s) Oral daily  valGANciclovir 450 milliGRAM(s) Oral daily    ----------------------------------------------------------------------------------------  ASSESSMENT/PLAN  64y Male with functional deficits after complicated and prolonged hospital course due to decompensated cirrhosis now s/p liver transplant 7/2.   s/p Liver transplant: Valgancyclovir, Bactrim ppx, cellcept, tacro - dose being adjusted due to tremors, methypred,   Chronic right foot ulcer: Meropenem, Collagenase, podiatry and ID following  Pancytopenia: Daily monitoring  HTN: Nifedipine XL 30mg, Labetalol PRN  IDDM: A1c 5.2% 6/17; Lantus/Lispro  Pain: Tylenol, oxycodone PRN, Dilaudid PRN  Acute gastritis and duodenitis with hemorrhage 2/2 GAVE prior to transplant: IV PPI, GI following  DVT PPX: SCDs    Rehab:  - Continue daily physical and occupational therapy to prevent immobility related complications such as DVT, pressure wounds and contracture.   - Recommend Z-flex boots to offload heels, continue wound care per podiatry for right heel ulcer.    - Will continue to follow for ongoing rehab needs and recommendations.   - Recommend ACUTE inpatient rehabilitation for the functional deficits consisting of 3 hours of therapy/day & 24 hour RN/daily PMR physician for comorbid medical management. Patient will be able to tolerate 3 hours a day.

## 2020-07-06 NOTE — CONSULT NOTE ADULT - ATTENDING COMMENTS
Seen and examined with resident. Agree with note.   Patient with debility and gait dysfunction.  Patient will need acute rehabilitation when stable.

## 2020-07-07 DIAGNOSIS — I10 ESSENTIAL (PRIMARY) HYPERTENSION: ICD-10-CM

## 2020-07-07 DIAGNOSIS — K74.60 UNSPECIFIED CIRRHOSIS OF LIVER: ICD-10-CM

## 2020-07-07 DIAGNOSIS — E11.621 TYPE 2 DIABETES MELLITUS WITH FOOT ULCER: ICD-10-CM

## 2020-07-07 DIAGNOSIS — E78.2 MIXED HYPERLIPIDEMIA: ICD-10-CM

## 2020-07-07 LAB
ALBUMIN SERPL ELPH-MCNC: 3.1 G/DL — LOW (ref 3.3–5)
ALBUMIN SERPL ELPH-MCNC: 3.3 G/DL — SIGNIFICANT CHANGE UP (ref 3.3–5)
ALP SERPL-CCNC: 113 U/L — SIGNIFICANT CHANGE UP (ref 40–120)
ALP SERPL-CCNC: 117 U/L — SIGNIFICANT CHANGE UP (ref 40–120)
ALT FLD-CCNC: 139 U/L — HIGH (ref 10–45)
ALT FLD-CCNC: 148 U/L — HIGH (ref 10–45)
ANION GAP SERPL CALC-SCNC: 14 MMOL/L — SIGNIFICANT CHANGE UP (ref 5–17)
ANION GAP SERPL CALC-SCNC: 8 MMOL/L — SIGNIFICANT CHANGE UP (ref 5–17)
APTT BLD: 25.8 SEC — LOW (ref 27.5–35.5)
APTT BLD: 25.9 SEC — LOW (ref 27.5–35.5)
AST SERPL-CCNC: 46 U/L — HIGH (ref 10–40)
AST SERPL-CCNC: 63 U/L — HIGH (ref 10–40)
BASE EXCESS BLDV CALC-SCNC: 5.3 MMOL/L — HIGH (ref -2–2)
BILIRUB DIRECT SERPL-MCNC: 0.7 MG/DL — HIGH (ref 0–0.2)
BILIRUB DIRECT SERPL-MCNC: 0.9 MG/DL — HIGH (ref 0–0.2)
BILIRUB INDIRECT FLD-MCNC: 0.9 MG/DL — SIGNIFICANT CHANGE UP (ref 0.2–1)
BILIRUB INDIRECT FLD-MCNC: 1 MG/DL — SIGNIFICANT CHANGE UP (ref 0.2–1)
BILIRUB SERPL-MCNC: 1.6 MG/DL — HIGH (ref 0.2–1.2)
BILIRUB SERPL-MCNC: 1.9 MG/DL — HIGH (ref 0.2–1.2)
BUN SERPL-MCNC: 35 MG/DL — HIGH (ref 7–23)
BUN SERPL-MCNC: 38 MG/DL — HIGH (ref 7–23)
CA-I SERPL-SCNC: 1.26 MMOL/L — SIGNIFICANT CHANGE UP (ref 1.12–1.3)
CALCIUM SERPL-MCNC: 8.8 MG/DL — SIGNIFICANT CHANGE UP (ref 8.4–10.5)
CALCIUM SERPL-MCNC: 9 MG/DL — SIGNIFICANT CHANGE UP (ref 8.4–10.5)
CHLORIDE BLDV-SCNC: 98 MMOL/L — SIGNIFICANT CHANGE UP (ref 96–108)
CHLORIDE SERPL-SCNC: 95 MMOL/L — LOW (ref 96–108)
CHLORIDE SERPL-SCNC: 97 MMOL/L — SIGNIFICANT CHANGE UP (ref 96–108)
CO2 BLDV-SCNC: 31 MMOL/L — HIGH (ref 22–30)
CO2 SERPL-SCNC: 22 MMOL/L — SIGNIFICANT CHANGE UP (ref 22–31)
CO2 SERPL-SCNC: 26 MMOL/L — SIGNIFICANT CHANGE UP (ref 22–31)
CREAT SERPL-MCNC: 1.05 MG/DL — SIGNIFICANT CHANGE UP (ref 0.5–1.3)
CREAT SERPL-MCNC: 1.07 MG/DL — SIGNIFICANT CHANGE UP (ref 0.5–1.3)
CULTURE RESULTS: SIGNIFICANT CHANGE UP
FIBRINOGEN PPP-MCNC: 175 MG/DL — LOW (ref 350–510)
FIBRINOGEN PPP-MCNC: 200 MG/DL — LOW (ref 350–510)
GAS PNL BLDV: 129 MMOL/L — LOW (ref 135–145)
GAS PNL BLDV: SIGNIFICANT CHANGE UP
GAS PNL BLDV: SIGNIFICANT CHANGE UP
GLUCOSE BLDC GLUCOMTR-MCNC: 104 MG/DL — HIGH (ref 70–99)
GLUCOSE BLDC GLUCOMTR-MCNC: 104 MG/DL — HIGH (ref 70–99)
GLUCOSE BLDC GLUCOMTR-MCNC: 124 MG/DL — HIGH (ref 70–99)
GLUCOSE BLDC GLUCOMTR-MCNC: 140 MG/DL — HIGH (ref 70–99)
GLUCOSE BLDC GLUCOMTR-MCNC: 150 MG/DL — HIGH (ref 70–99)
GLUCOSE BLDC GLUCOMTR-MCNC: 176 MG/DL — HIGH (ref 70–99)
GLUCOSE BLDC GLUCOMTR-MCNC: 205 MG/DL — HIGH (ref 70–99)
GLUCOSE BLDC GLUCOMTR-MCNC: 217 MG/DL — HIGH (ref 70–99)
GLUCOSE BLDC GLUCOMTR-MCNC: 221 MG/DL — HIGH (ref 70–99)
GLUCOSE BLDC GLUCOMTR-MCNC: 235 MG/DL — HIGH (ref 70–99)
GLUCOSE BLDC GLUCOMTR-MCNC: 274 MG/DL — HIGH (ref 70–99)
GLUCOSE BLDV-MCNC: 279 MG/DL — HIGH (ref 70–99)
GLUCOSE SERPL-MCNC: 171 MG/DL — HIGH (ref 70–99)
GLUCOSE SERPL-MCNC: 285 MG/DL — HIGH (ref 70–99)
HCO3 BLDV-SCNC: 30 MMOL/L — HIGH (ref 21–29)
HCT VFR BLD CALC: 29.7 % — LOW (ref 39–50)
HCT VFR BLD CALC: 32.2 % — LOW (ref 39–50)
HCT VFR BLDA CALC: 32 % — LOW (ref 39–50)
HGB BLD CALC-MCNC: 10.3 G/DL — LOW (ref 13–17)
HGB BLD-MCNC: 10.1 G/DL — LOW (ref 13–17)
HGB BLD-MCNC: 10.7 G/DL — LOW (ref 13–17)
HOROWITZ INDEX BLDV+IHG-RTO: 28 — SIGNIFICANT CHANGE UP
INR BLD: 1.15 RATIO — SIGNIFICANT CHANGE UP (ref 0.88–1.16)
INR BLD: 1.19 RATIO — HIGH (ref 0.88–1.16)
LACTATE BLDV-MCNC: 1.9 MMOL/L — SIGNIFICANT CHANGE UP (ref 0.7–2)
MAGNESIUM SERPL-MCNC: 1.8 MG/DL — SIGNIFICANT CHANGE UP (ref 1.6–2.6)
MAGNESIUM SERPL-MCNC: 2 MG/DL — SIGNIFICANT CHANGE UP (ref 1.6–2.6)
MCHC RBC-ENTMCNC: 31.1 PG — SIGNIFICANT CHANGE UP (ref 27–34)
MCHC RBC-ENTMCNC: 31.6 PG — SIGNIFICANT CHANGE UP (ref 27–34)
MCHC RBC-ENTMCNC: 33.2 GM/DL — SIGNIFICANT CHANGE UP (ref 32–36)
MCHC RBC-ENTMCNC: 34 GM/DL — SIGNIFICANT CHANGE UP (ref 32–36)
MCV RBC AUTO: 92.8 FL — SIGNIFICANT CHANGE UP (ref 80–100)
MCV RBC AUTO: 93.6 FL — SIGNIFICANT CHANGE UP (ref 80–100)
NRBC # BLD: 0 /100 WBCS — SIGNIFICANT CHANGE UP (ref 0–0)
NRBC # BLD: 0 /100 WBCS — SIGNIFICANT CHANGE UP (ref 0–0)
OTHER CELLS CSF MANUAL: 12 ML/DL — LOW (ref 18–22)
PCO2 BLDV: 46 MMHG — SIGNIFICANT CHANGE UP (ref 35–50)
PH BLDV: 7.42 — SIGNIFICANT CHANGE UP (ref 7.35–7.45)
PHOSPHATE SERPL-MCNC: 2 MG/DL — LOW (ref 2.5–4.5)
PHOSPHATE SERPL-MCNC: 2.7 MG/DL — SIGNIFICANT CHANGE UP (ref 2.5–4.5)
PLATELET # BLD AUTO: 78 K/UL — LOW (ref 150–400)
PLATELET # BLD AUTO: 81 K/UL — LOW (ref 150–400)
PO2 BLDV: 51 MMHG — HIGH (ref 25–45)
POTASSIUM BLDV-SCNC: 4.1 MMOL/L — SIGNIFICANT CHANGE UP (ref 3.5–5.3)
POTASSIUM SERPL-MCNC: 4.1 MMOL/L — SIGNIFICANT CHANGE UP (ref 3.5–5.3)
POTASSIUM SERPL-MCNC: 4.4 MMOL/L — SIGNIFICANT CHANGE UP (ref 3.5–5.3)
POTASSIUM SERPL-SCNC: 4.1 MMOL/L — SIGNIFICANT CHANGE UP (ref 3.5–5.3)
POTASSIUM SERPL-SCNC: 4.4 MMOL/L — SIGNIFICANT CHANGE UP (ref 3.5–5.3)
PROT SERPL-MCNC: 5 G/DL — LOW (ref 6–8.3)
PROT SERPL-MCNC: 5.1 G/DL — LOW (ref 6–8.3)
PROTHROM AB SERPL-ACNC: 13.1 SEC — SIGNIFICANT CHANGE UP (ref 10.6–13.6)
PROTHROM AB SERPL-ACNC: 13.5 SEC — SIGNIFICANT CHANGE UP (ref 10.6–13.6)
RBC # BLD: 3.2 M/UL — LOW (ref 4.2–5.8)
RBC # BLD: 3.44 M/UL — LOW (ref 4.2–5.8)
RBC # FLD: 16.4 % — HIGH (ref 10.3–14.5)
RBC # FLD: 16.7 % — HIGH (ref 10.3–14.5)
SAO2 % BLDV: 85 % — SIGNIFICANT CHANGE UP (ref 67–88)
SODIUM SERPL-SCNC: 131 MMOL/L — LOW (ref 135–145)
SODIUM SERPL-SCNC: 131 MMOL/L — LOW (ref 135–145)
SPECIMEN SOURCE: SIGNIFICANT CHANGE UP
TACROLIMUS SERPL-MCNC: 10.4 NG/ML — SIGNIFICANT CHANGE UP
WBC # BLD: 4.71 K/UL — SIGNIFICANT CHANGE UP (ref 3.8–10.5)
WBC # BLD: 5.43 K/UL — SIGNIFICANT CHANGE UP (ref 3.8–10.5)
WBC # FLD AUTO: 4.71 K/UL — SIGNIFICANT CHANGE UP (ref 3.8–10.5)
WBC # FLD AUTO: 5.43 K/UL — SIGNIFICANT CHANGE UP (ref 3.8–10.5)

## 2020-07-07 PROCEDURE — 99233 SBSQ HOSP IP/OBS HIGH 50: CPT | Mod: GC

## 2020-07-07 PROCEDURE — 99232 SBSQ HOSP IP/OBS MODERATE 35: CPT | Mod: GC

## 2020-07-07 PROCEDURE — 99232 SBSQ HOSP IP/OBS MODERATE 35: CPT

## 2020-07-07 PROCEDURE — 99233 SBSQ HOSP IP/OBS HIGH 50: CPT

## 2020-07-07 RX ORDER — INSULIN LISPRO 100/ML
VIAL (ML) SUBCUTANEOUS AT BEDTIME
Refills: 0 | Status: DISCONTINUED | OUTPATIENT
Start: 2020-07-07 | End: 2020-07-08

## 2020-07-07 RX ORDER — DIPHENHYDRAMINE HCL 50 MG
25 CAPSULE ORAL ONCE
Refills: 0 | Status: COMPLETED | OUTPATIENT
Start: 2020-07-07 | End: 2020-07-07

## 2020-07-07 RX ORDER — DIPHENHYDRAMINE HCL 50 MG
25 CAPSULE ORAL ONCE
Refills: 0 | Status: DISCONTINUED | OUTPATIENT
Start: 2020-07-07 | End: 2020-07-07

## 2020-07-07 RX ORDER — INSULIN LISPRO 100/ML
10 VIAL (ML) SUBCUTANEOUS
Refills: 0 | Status: DISCONTINUED | OUTPATIENT
Start: 2020-07-07 | End: 2020-07-08

## 2020-07-07 RX ORDER — MAGNESIUM SULFATE 500 MG/ML
2 VIAL (ML) INJECTION ONCE
Refills: 0 | Status: COMPLETED | OUTPATIENT
Start: 2020-07-07 | End: 2020-07-07

## 2020-07-07 RX ORDER — TACROLIMUS 5 MG/1
2 CAPSULE ORAL
Refills: 0 | Status: DISCONTINUED | OUTPATIENT
Start: 2020-07-07 | End: 2020-07-07

## 2020-07-07 RX ORDER — TACROLIMUS 5 MG/1
3 CAPSULE ORAL
Refills: 0 | Status: COMPLETED | OUTPATIENT
Start: 2020-07-07 | End: 2020-07-07

## 2020-07-07 RX ORDER — INSULIN GLARGINE 100 [IU]/ML
16 INJECTION, SOLUTION SUBCUTANEOUS AT BEDTIME
Refills: 0 | Status: DISCONTINUED | OUTPATIENT
Start: 2020-07-07 | End: 2020-07-08

## 2020-07-07 RX ORDER — LANOLIN ALCOHOL/MO/W.PET/CERES
3 CREAM (GRAM) TOPICAL ONCE
Refills: 0 | Status: COMPLETED | OUTPATIENT
Start: 2020-07-07 | End: 2020-07-07

## 2020-07-07 RX ORDER — TACROLIMUS 5 MG/1
3 CAPSULE ORAL ONCE
Refills: 0 | Status: COMPLETED | OUTPATIENT
Start: 2020-07-07 | End: 2020-07-07

## 2020-07-07 RX ORDER — MAGNESIUM OXIDE 400 MG ORAL TABLET 241.3 MG
400 TABLET ORAL
Refills: 0 | Status: DISCONTINUED | OUTPATIENT
Start: 2020-07-07 | End: 2020-07-11

## 2020-07-07 RX ORDER — INSULIN LISPRO 100/ML
VIAL (ML) SUBCUTANEOUS
Refills: 0 | Status: DISCONTINUED | OUTPATIENT
Start: 2020-07-07 | End: 2020-07-08

## 2020-07-07 RX ADMIN — Medication 1 TABLET(S): at 11:34

## 2020-07-07 RX ADMIN — Medication 1 APPLICATION(S): at 11:35

## 2020-07-07 RX ADMIN — NYSTATIN CREAM 1 APPLICATION(S): 100000 CREAM TOPICAL at 05:29

## 2020-07-07 RX ADMIN — INSULIN GLARGINE 16 UNIT(S): 100 INJECTION, SOLUTION SUBCUTANEOUS at 22:16

## 2020-07-07 RX ADMIN — TACROLIMUS 3 MILLIGRAM(S): 5 CAPSULE ORAL at 09:02

## 2020-07-07 RX ADMIN — Medication 10 MILLIGRAM(S): at 05:28

## 2020-07-07 RX ADMIN — NYSTATIN CREAM 1 APPLICATION(S): 100000 CREAM TOPICAL at 17:32

## 2020-07-07 RX ADMIN — VALGANCICLOVIR 450 MILLIGRAM(S): 450 TABLET, FILM COATED ORAL at 11:34

## 2020-07-07 RX ADMIN — CHLORHEXIDINE GLUCONATE 1 APPLICATION(S): 213 SOLUTION TOPICAL at 05:28

## 2020-07-07 RX ADMIN — PANTOPRAZOLE SODIUM 40 MILLIGRAM(S): 20 TABLET, DELAYED RELEASE ORAL at 05:27

## 2020-07-07 RX ADMIN — Medication 100000 UNIT(S): at 05:27

## 2020-07-07 RX ADMIN — Medication 10 UNIT(S): at 17:34

## 2020-07-07 RX ADMIN — Medication 100000 UNIT(S): at 17:33

## 2020-07-07 RX ADMIN — MAGNESIUM OXIDE 400 MG ORAL TABLET 400 MILLIGRAM(S): 241.3 TABLET ORAL at 17:34

## 2020-07-07 RX ADMIN — Medication 100000 UNIT(S): at 11:34

## 2020-07-07 RX ADMIN — Medication 83.33 MILLIMOLE(S): at 04:12

## 2020-07-07 RX ADMIN — Medication 4: at 16:27

## 2020-07-07 RX ADMIN — Medication 81 MILLIGRAM(S): at 11:34

## 2020-07-07 RX ADMIN — MYCOPHENOLATE MOFETIL 1000 MILLIGRAM(S): 250 CAPSULE ORAL at 08:21

## 2020-07-07 RX ADMIN — MEROPENEM 100 MILLIGRAM(S): 1 INJECTION INTRAVENOUS at 05:28

## 2020-07-07 RX ADMIN — TACROLIMUS 3 MILLIGRAM(S): 5 CAPSULE ORAL at 20:45

## 2020-07-07 RX ADMIN — Medication 10 MILLIGRAM(S): at 17:32

## 2020-07-07 RX ADMIN — Medication 3 MILLIGRAM(S): at 22:02

## 2020-07-07 RX ADMIN — PANTOPRAZOLE SODIUM 40 MILLIGRAM(S): 20 TABLET, DELAYED RELEASE ORAL at 17:32

## 2020-07-07 RX ADMIN — Medication 250 MILLIMOLE(S): at 16:12

## 2020-07-07 RX ADMIN — TAMSULOSIN HYDROCHLORIDE 0.4 MILLIGRAM(S): 0.4 CAPSULE ORAL at 22:02

## 2020-07-07 RX ADMIN — Medication 30 MILLIGRAM(S): at 06:19

## 2020-07-07 RX ADMIN — MYCOPHENOLATE MOFETIL 1000 MILLIGRAM(S): 250 CAPSULE ORAL at 20:46

## 2020-07-07 RX ADMIN — Medication 50 GRAM(S): at 16:12

## 2020-07-07 RX ADMIN — Medication 4: at 11:34

## 2020-07-07 RX ADMIN — Medication 25 MILLIGRAM(S): at 03:00

## 2020-07-07 NOTE — CONSULT NOTE ADULT - PROBLEM SELECTOR RECOMMENDATION 3
-defer statin in the setting of recent liver x-plant  -discussed with OLY Polanco and RN Carla Holley MD  Pager: [phone], between 9am-6pm  Nights and Weekends: [phone]

## 2020-07-07 NOTE — PROGRESS NOTE ADULT - ASSESSMENT
Assessment:  The patient is a 64 year old male with PMHx of insulin dependent diabetes mellitus, hyperlipidemia, obesity, heart failure with preserved ejection fraction with mild LV diastolic dysfunction, MGUS, chronic anemia, duodenal ulcer, GAVE,  duodenal AVM s/p APC (last on 10/11/19) who presented with 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 The patient has a history of multiple recent hospitalizations due to UTIs, emphysematous cystitis and prostate abscess. The patient was re-admitted with hepatic encephalopathy and VRE UTI, also with MISA on CKD. S/p liver transplant 7/2/20.     1) Liver transplant recipient  Continue bactrim, valcyte for prophylaxis   Prograf increased to 2 mg BID by primary team   On MMF and prednisone taper     2) Pancytopenia  Monitor CBC daily     3) Right foot ulcer   Meropenem discontinued today  No erythema or purulence    Wound care daily  Podiatry on board     4) Hallucinations  Attributed to corticosteroids - appreciate transplant hepatology recommendations  continue to monitor    Infectious disease will continue to follow.      Charlie Lafleur MD PGY-4   Fellow, Infectious Diseases   Pager: [phone]  If no response, after 5pm and on weekends: Call [phone]

## 2020-07-07 NOTE — PROGRESS NOTE ADULT - SUBJECTIVE AND OBJECTIVE BOX
SICU Daily Progress Note  =====================================================  Interval/Overnight Events:    - [facility] Intro d/c'd  - Insulin gtt restarted, glu 200-300  - Tacro 3mg BID, Tacro level 5.7  - Lasix 40 IV  - ASA, Nifedipine 30 qd      HPI:   64y Male c/b small esophageal varices (12/2019), portal hypertensive gastropathy, ascites, hx SBP, hx hepatic encephalopathy, GAVE syndrome s/p APC, hx duodenal ulcer (5/2019), HTN, HLD, DM, HFpEF (EF 70%), recent ESBL E coli prostate abscess (3/2020 s/p 4 weeks ertapenem), orthostatic hypotension ( on midodrine) who was initially admitted to medicine for hepatic encephalopathy. Hospital course c/b MISA, VRE UTI, acute blood loss anemia, received blood transfusion, EGD done 6/22 with Grade II esophageal varices, acute gastritis with hemorrhage and acute duodenitis with hemorrhage. Patient had worsening mental status, concerning decompensating liver cirrhosis, hepatic encephalopathy patient then transferred to SICU for further care.      Allergies: codeine (Anaphylaxis)      MEDICATIONS:   --------------------------------------------------------------------------------------  Neurologic Medications  HYDROmorphone  Injectable 0.5 milliGRAM(s) IV Push every 3 hours PRN breakthrough  oxyCODONE    IR 5 milliGRAM(s) Oral every 4 hours PRN Moderate Pain (4 - 6)  oxyCODONE    IR 10 milliGRAM(s) Oral every 4 hours PRN Severe Pain (7 - 10)    Respiratory Medications    Cardiovascular Medications  labetalol Injectable 10 milliGRAM(s) IV Push every 1 hour PRN systolic blood pressure GREATER THAN 170 mmHg or diastolic blood pressure GREATER THAN 100 mmHg  NIFEdipine XL 30 milliGRAM(s) Oral daily  tamsulosin 0.4 milliGRAM(s) Oral at bedtime    Gastrointestinal Medications  pantoprazole  Injectable 40 milliGRAM(s) IV Push every 12 hours  sodium chloride 0.9% lock flush 10 milliLiter(s) IV Push every 1 hour PRN Pre/post blood products, medications, blood draw, and to maintain line patency    Genitourinary Medications    Hematologic/Oncologic Medications  aspirin enteric coated 81 milliGRAM(s) Oral daily  mycophenolate mofetil 1000 milliGRAM(s) Oral <User Schedule>  tacrolimus 3 milliGRAM(s) Oral <User Schedule>    Antimicrobial/Immunologic Medications  meropenem  IVPB 1000 milliGRAM(s) IV Intermittent every 8 hours  nystatin    Suspension 354669 Unit(s) Swish and Swallow four times a day  trimethoprim   80 mG/sulfamethoxazole 400 mG 1 Tablet(s) Oral daily  valGANciclovir 450 milliGRAM(s) Oral daily    Endocrine/Metabolic Medications  insulin regular Infusion 3 Unit(s)/Hr IV Continuous <Continuous>  methylPREDNISolone sodium succinate Injectable 10 milliGRAM(s) IV Push two times a day  methylPREDNISolone sodium succinate Injectable   IV Push     Topical/Other Medications  chlorhexidine 2% Cloths 1 Application(s) Topical <User Schedule>  chlorhexidine 4% Liquid 1 Application(s) Topical <User Schedule>  collagenase Ointment 1 Application(s) Topical daily  nystatin Cream 1 Application(s) Topical two times a day    --------------------------------------------------------------------------------------    VITAL SIGNS, INS/OUTS (last 24 hours):  --------------------------------------------------------------------------------------  ICU Vital Signs Last 24 Hrs  T(C): 36.5 (06 Jul 2020 23:00), Max: 36.8 (06 Jul 2020 19:00)  T(F): 97.7 (06 Jul 2020 23:00), Max: 98.2 (06 Jul 2020 19:00)  HR: 82 (07 Jul 2020 00:00) (74 - 138)  BP: 116/71 (07 Jul 2020 00:00) (103/59 - 152/67)  BP(mean): 87 (07 Jul 2020 00:00) (78 - 127)  ABP: --  ABP(mean): --  RR: 19 (07 Jul 2020 00:00) (15 - 37)  SpO2: 98% (07 Jul 2020 00:00) (83% - 100%)    --------------------------------------------------------------------------------------    EXAM  NEUROLOGY  Exam: NAD, alert, oriented x2, no focal deficits. Appears disoriented and confused    HEENT  Exam: Normocephalic, atraumatic    RESPIRATORY  Exam: Lungs clear to auscultation, Normal expansion/effort.  Mechanical Ventilation: N/A    CARDIOVASCULAR  Exam: S1, S2.  Regular rate and rhythm.     GI/NUTRITION  Exam: Abdomen soft, Non-tender, Non-distended. ANDREINA serosang  Wound: c/d/i  Current Diet:  Consistent carb diet     VASCULAR  Exam: Extremities warm, pink, well-perfused.    MUSCULOSKELETAL  Exam: All extremities moving spontaneously without limitations. pitting edema in bilateral lower extremities     SKIN  Exam: Good skin turgor, no skin breakdown.    METABOLIC/FLUIDS/ELECTROLYTES      HEMATOLOGIC  [x] VTE Prophylaxis: aspirin enteric coated 81 milliGRAM(s) Oral daily    Transfusions:	[] PRBC	[] Platelets		[] FFP	[] Cryoprecipitate    INFECTIOUS DISEASE  Antimicrobials/Immunologic Medications:  meropenem  IVPB 1000 milliGRAM(s) IV Intermittent every 8 hours  mycophenolate mofetil 1000 milliGRAM(s) Oral <User Schedule>  nystatin    Suspension [id number] Unit(s) Swish and Swallow four times a day  tacrolimus 3 milliGRAM(s) Oral <User Schedule>  trimethoprim   80 mG/sulfamethoxazole 400 mG 1 Tablet(s) Oral daily  valGANciclovir 450 milliGRAM(s) Oral daily      Tubes/Lines/Drains    [x] Peripheral IV  [] Central Venous Line     	[] R	[] L	[] IJ	[] Fem	[] SC	Date Placed:   [] Arterial Line		[] R	[] L	[] Fem	[] Rad	[] Ax	Date Placed:   [] PICC		[] Midline		[] Mediport  [] Urinary Catheter		Date Placed:   [x] Necessity of urinary, arterial, and venous catheters discussed    LABS  --------------------------------------------------------------------------------------  07-06    134<L>  |  98  |  40<H>  ----------------------------<  162<H>  4.3   |  35<H>  |  1.00    Ca    9.3      06 Jul 2020 13:06  Phos  2.3     07-06  Mg     2.2     07-06    TPro  5.3<L>  /  Alb  3.2<L>  /  TBili  1.8<H>  /  DBili  0.9<H>  /  AST  45<H>  /  ALT  154<H>  /  AlkPhos  80  07-06                          10.1   6.16  )-----------( 87       ( 06 Jul 2020 13:06 )             29.6     PT/INR - ( 06 Jul 2020 02:05 )   PT: 14.6 sec;   INR: 1.28 ratio         PTT - ( 06 Jul 2020 02:05 )  PTT:25.1 sec  --------------------------------------------------------------------------------------    OTHER LABORATORY:     Tacro: 5.7    IMAGING STUDIES:   CXR: Clear lungs    ASSESSMENT:  64y Male c/b small esophageal varices (12/2019), portal hypertensive gastropathy, ascites, hx SBP, hx hepatic encephalopathy, GAVE syndrome s/p APC, hx duodenal ulcer (5/2019), HTN, HLD, DM, HFpEF (EF 70%), recent ESBL E coli prostate abscess (3/2020 s/p 4 weeks ertapenem), orthostatic hypotension ( on midodrine) who was initially admitted to medicine for hepatic encephalopathy. Hospital course c/b MISA, VRE UTI, acute blood loss anemia, received blood transfusion, EGD done 6/22 with Grade II esophageal varices, acute gastritis with hemorrhage and acute duodenitis with hemorrhage. Patient had worsening mental status, concerning decompensating liver cirrhosis, hepatic encephalopathy patient then transferred to SICU for further care.      Neurologic: hepatic encephalopathy. latest ammonia 31  - Oxy 5/10, dilaudid for breakthrough   - Neuro status worsening, appears more confused    Respiratory:   - ISS, deep breathing, supplemental O2 as needed    Cardiovascular: Intermittent hypertension   - nicardipine 30 qd, Goal SBP , 145mmHg   - Labetalol PRN    Gastrointestinal/Nutrition: GIB, JEAN cirrhosis, GAVE syndrome. S/p liver transplant   - Consistent carb diet   - Protonix 40mg. daily  - Trend LFTs, currently downtrending    Renal: urinary retention, hyponatremia  - Hyponatremia improving 134<132<132  - BUN/Cr improved 40/1<39/1.32    Heme: coagulopathy  - Venodynes for mechanical VTE prophylaxis, holding chemical VTE prophylaxis in the setting of coagulopathy/GIB    Infectious Disease: Emperic Coverage and prophyaxis in setting of immunosuppresion   - c/w meropenem -diabetic foot ulcer   - Conitnue PPx as per trasnplant: Bactrim, valcyte  - Continue immunosuppresion as per tranplant; solumedrol, cellcept, tacro    Endo: DM type II, HLD  - Continue insulin gtt   - glu 200-300    Disposition:  - SICU full code SICU Daily Progress Note  =====================================================  Interval/Overnight Events:    - [facility] Intro d/c'd  - Insulin gtt restarted, glu 200-300  - Tacro 3mg BID, Tacro level 5.7, currently holding per transplant   - Lasix 40 IV  - ASA, Nifedipine 30 qd      HPI:   64y Male c/b small esophageal varices (12/2019), portal hypertensive gastropathy, ascites, hx SBP, hx hepatic encephalopathy, GAVE syndrome s/p APC, hx duodenal ulcer (5/2019), HTN, HLD, DM, HFpEF (EF 70%), recent ESBL E coli prostate abscess (3/2020 s/p 4 weeks ertapenem), orthostatic hypotension ( on midodrine) who was initially admitted to medicine for hepatic encephalopathy. Hospital course c/b MISA, VRE UTI, acute blood loss anemia, received blood transfusion, EGD done 6/22 with Grade II esophageal varices, acute gastritis with hemorrhage and acute duodenitis with hemorrhage. Patient had worsening mental status, concerning decompensating liver cirrhosis, hepatic encephalopathy patient then transferred to SICU for further care.      Allergies: codeine (Anaphylaxis)      MEDICATIONS:   --------------------------------------------------------------------------------------  Neurologic Medications  HYDROmorphone  Injectable 0.5 milliGRAM(s) IV Push every 3 hours PRN breakthrough  oxyCODONE    IR 5 milliGRAM(s) Oral every 4 hours PRN Moderate Pain (4 - 6)  oxyCODONE    IR 10 milliGRAM(s) Oral every 4 hours PRN Severe Pain (7 - 10)    Respiratory Medications    Cardiovascular Medications  labetalol Injectable 10 milliGRAM(s) IV Push every 1 hour PRN systolic blood pressure GREATER THAN 170 mmHg or diastolic blood pressure GREATER THAN 100 mmHg  NIFEdipine XL 30 milliGRAM(s) Oral daily  tamsulosin 0.4 milliGRAM(s) Oral at bedtime    Gastrointestinal Medications  pantoprazole  Injectable 40 milliGRAM(s) IV Push every 12 hours  sodium chloride 0.9% lock flush 10 milliLiter(s) IV Push every 1 hour PRN Pre/post blood products, medications, blood draw, and to maintain line patency    Genitourinary Medications    Hematologic/Oncologic Medications  aspirin enteric coated 81 milliGRAM(s) Oral daily  mycophenolate mofetil 1000 milliGRAM(s) Oral <User Schedule>  tacrolimus 3 milliGRAM(s) Oral <User Schedule>    Antimicrobial/Immunologic Medications  meropenem  IVPB 1000 milliGRAM(s) IV Intermittent every 8 hours  nystatin    Suspension [id number] Unit(s) Swish and Swallow four times a day  trimethoprim   80 mG/sulfamethoxazole 400 mG 1 Tablet(s) Oral daily  valGANciclovir 450 milliGRAM(s) Oral daily    Endocrine/Metabolic Medications  insulin regular Infusion 3 Unit(s)/Hr IV Continuous <Continuous>  methylPREDNISolone sodium succinate Injectable 10 milliGRAM(s) IV Push two times a day  methylPREDNISolone sodium succinate Injectable   IV Push     Topical/Other Medications  chlorhexidine 2% Cloths 1 Application(s) Topical <User Schedule>  chlorhexidine 4% Liquid 1 Application(s) Topical <User Schedule>  collagenase Ointment 1 Application(s) Topical daily  nystatin Cream 1 Application(s) Topical two times a day    --------------------------------------------------------------------------------------    VITAL SIGNS, INS/OUTS (last 24 hours):  --------------------------------------------------------------------------------------  ICU Vital Signs Last 24 Hrs  T(C): 36.5 (06 Jul 2020 23:00), Max: 36.8 (06 Jul 2020 19:00)  T(F): 97.7 (06 Jul 2020 23:00), Max: 98.2 (06 Jul 2020 19:00)  HR: 82 (07 Jul 2020 00:00) (74 - 138)  BP: 116/71 (07 Jul 2020 00:00) (103/59 - 152/67)  BP(mean): 87 (07 Jul 2020 00:00) (78 - 127)  ABP: --  ABP(mean): --  RR: 19 (07 Jul 2020 00:00) (15 - 37)  SpO2: 98% (07 Jul 2020 00:00) (83% - 100%)    --------------------------------------------------------------------------------------    EXAM  NEUROLOGY  Exam: NAD, alert, oriented x2, no focal deficits. Appears disoriented and confused    HEENT  Exam: Normocephalic, atraumatic    RESPIRATORY  Exam: Lungs clear to auscultation, Normal expansion/effort.  Mechanical Ventilation: N/A    CARDIOVASCULAR  Exam: S1, S2.  Regular rate and rhythm.     GI/NUTRITION  Exam: Abdomen soft, Non-tender, Non-distended. ANDREINA serosang  Wound: c/d/i  Current Diet:  Consistent carb diet     VASCULAR  Exam: Extremities warm, pink, well-perfused.    MUSCULOSKELETAL  Exam: All extremities moving spontaneously without limitations. pitting edema in bilateral lower extremities     SKIN  Exam: Good skin turgor, no skin breakdown.    METABOLIC/FLUIDS/ELECTROLYTES      HEMATOLOGIC  [x] VTE Prophylaxis: aspirin enteric coated 81 milliGRAM(s) Oral daily    Transfusions:	[] PRBC	[] Platelets		[] FFP	[] Cryoprecipitate    INFECTIOUS DISEASE  Antimicrobials/Immunologic Medications:  meropenem  IVPB 1000 milliGRAM(s) IV Intermittent every 8 hours  mycophenolate mofetil 1000 milliGRAM(s) Oral <User Schedule>  nystatin    Suspension [id number] Unit(s) Swish and Swallow four times a day  tacrolimus 3 milliGRAM(s) Oral <User Schedule>  trimethoprim   80 mG/sulfamethoxazole 400 mG 1 Tablet(s) Oral daily  valGANciclovir 450 milliGRAM(s) Oral daily      Tubes/Lines/Drains    [x] Peripheral IV  [] Central Venous Line     	[] R	[] L	[] IJ	[] Fem	[] SC	Date Placed:   [] Arterial Line		[] R	[] L	[] Fem	[] Rad	[] Ax	Date Placed:   [] PICC		[] Midline		[] Mediport  [] Urinary Catheter		Date Placed:   [x] Necessity of urinary, arterial, and venous catheters discussed    LABS  --------------------------------------------------------------------------------------  07-06    134<L>  |  98  |  40<H>  ----------------------------<  162<H>  4.3   |  35<H>  |  1.00    Ca    9.3      06 Jul 2020 13:06  Phos  2.3     07-06  Mg     2.2     07-06    TPro  5.3<L>  /  Alb  3.2<L>  /  TBili  1.8<H>  /  DBili  0.9<H>  /  AST  45<H>  /  ALT  154<H>  /  AlkPhos  80  07-06                          10.1   6.16  )-----------( 87       ( 06 Jul 2020 13:06 )             29.6     PT/INR - ( 06 Jul 2020 02:05 )   PT: 14.6 sec;   INR: 1.28 ratio         PTT - ( 06 Jul 2020 02:05 )  PTT:25.1 sec  --------------------------------------------------------------------------------------    OTHER LABORATORY:     Tacro: 5.7    IMAGING STUDIES:   CXR: Clear lungs    ASSESSMENT:  64y Male c/b small esophageal varices (12/2019), portal hypertensive gastropathy, ascites, hx SBP, hx hepatic encephalopathy, GAVE syndrome s/p APC, hx duodenal ulcer (5/2019), HTN, HLD, DM, HFpEF (EF 70%), recent ESBL E coli prostate abscess (3/2020 s/p 4 weeks ertapenem), orthostatic hypotension ( on midodrine) who was initially admitted to medicine for hepatic encephalopathy. Hospital course c/b MISA, VRE UTI, acute blood loss anemia, received blood transfusion, EGD done 6/22 with Grade II esophageal varices, acute gastritis with hemorrhage and acute duodenitis with hemorrhage. Patient had worsening mental status, concerning decompensating liver cirrhosis, hepatic encephalopathy patient then transferred to SICU for further care.      Neurologic: hepatic encephalopathy. latest ammonia 31  - Oxy 5/10, dilaudid for breakthrough   - Neuro status worsening, appears more confused    Respiratory:   - ISS, deep breathing, supplemental O2 as needed    Cardiovascular: Intermittent hypertension   - nicardipine 30 qd, Goal SBP , 145mmHg   - Labetalol PRN    Gastrointestinal/Nutrition: GIB, JEAN cirrhosis, GAVE syndrome. S/p liver transplant   - Consistent carb diet   - Protonix 40mg. daily  - Trend LFTs, currently downtrending    Renal: urinary retention, hyponatremia  - Hyponatremia improving 134<132<132  - BUN/Cr improved 40/1<39/1.32    Heme: coagulopathy  - Venodynes for mechanical VTE prophylaxis, holding chemical VTE prophylaxis in the setting of coagulopathy/GIB    Infectious Disease: Emperic Coverage and prophyaxis in setting of immunosuppresion   - c/w meropenem -diabetic foot ulcer   - Conitnue PPx as per trasnplant: Bactrim, valcyte  - Continue immunosuppresion as per tranplant; solumedrol, cellcept, tacro    Endo: DM type II, HLD  - Continue insulin gtt   - glu 200-300    Disposition:  - SICU full code

## 2020-07-07 NOTE — PROGRESS NOTE ADULT - ATTENDING COMMENTS
insulin drip restarted last night due to inadequate control  nifedipine reduced dose given  this AM is confused and CAM pos, likely result of tacrolimus encephalopathy  on inspection of heel wound, favorably healing  neg 2 liter balance with diuretics yesterday  ANDREINA 260, T tube 0  bob removed  no central line in place  in chair    - hold of diuresis as appears euvolemic  - on 2 liter nc, wean O2  - discontinue dilaudid to avoid change in mental status  - endocrine consult for glycemic control pending transplant approval  - immunosuppresion per transplant  - no VTE PPX per transplant

## 2020-07-07 NOTE — PROGRESS NOTE ADULT - ASSESSMENT
64 M hx of DM, HLD, GERD, HFpEF with mild LV diastolic dysfunction, and decompensated JEAN cirrhosis c/b ascites with hx of SBP, HE, duodenal ulcer, GAVE and duodenal AVM s/p APC (last on 10/11/19), UNOS listed for liver transplantation at [facility]. 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 He is now re-admitted with hepatic encephalopathy and VRE UTI, also with MISA on CKD. S/p liver transplant 7/2.    Impression:  #S/p liver transplant 7/2:  OR details:  - L groin cutdown for vv bypass   - anastomosis: bicaval end-end/CBD duct-duct/HA & PV end-end, all standard anatomy.    - IOC with good flow  - Simulect induction. 500mg Solumedrol  - JPs x3 with T-Tube  - 14U pRBC, 14U FFP, 10 PLT, 11 CRYO, 500mL returned from cell saver, EBL 5L, UOP 1100mL  Recipient Info:   ABO: A  CMV:  Neg  EBV Positive  Donor:  Donor ID:  [id number] Match: [id number]  Age: 29 ABO:  A1 High Risk:   No COD: Cardiovascular  Anti CMV positive, EBV IgG- positive  HepBcAb-neg, Hepatitis C-DANA- neg, Hepatitis C ab-neg    #GI bleed - pretransplant with acute blood loss anemia s/p 10U PRBCs, bleeding from known GAVE, Hb stable post-transplant, but reported melena 7/5 AM. Hgb now stable. GAVE and PHG should have improved w/ liver transplant, most likely etiology is NG tube trauma prior if any c/f GI bleed  #Tremors – mildly confused, c/f taco toxicity +/- opioid effectrs. Holding tacro, level was 10 this AM. May need medication change based on clinical course  # Hypertension on nifedipine now  # Lower ext edema: diuresing, on lasix  #R posterior heel wound w/ overlying eschar – on meropenem for 1 more day. Local wound care. no signs of infections, XR neg for gas, evaluated by podiatry and ID. MRI w/o contrast limited, but no overt signs of active infection.  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    Recommendation:  - remove lines/bob as able (per surgery/SICU)  - c/w cellcept, methylpred taper  - hold tacro given levels and c/f neurotoxicity  - glycemic control with insulin  - s/p course of meropenem  - c/w nystatin, Bactrim, valcyte ppx  - trend Hb- PO PPI BID, monitor bowel movements  - aspirin per surgery  - trend renal function  - advance diet, low Na diet  - f/u wound care/nursing for R heel ulcer  - f/u transplant surgery recs  - rest of care per SICU team  - transplant hepatology will follow 64 M hx of DM, HLD, GERD, HFpEF with mild LV diastolic dysfunction, and decompensated JEAN cirrhosis c/b ascites with hx of SBP, HE, duodenal ulcer, GAVE and duodenal AVM s/p APC (last on 10/11/19), UNOS listed for liver transplantation at [facility]. 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 He is now re-admitted with hepatic encephalopathy and VRE UTI, also with MISA on CKD. S/p liver transplant 7/2.    Impression:  #S/p liver transplant 7/2:  OR details:  - L groin cutdown for vv bypass   - anastomosis: bicaval end-end/CBD duct-duct/HA & PV end-end, all standard anatomy.    - IOC with good flow  - Simulect induction. 500mg Solumedrol  - JPs x3 with T-Tube  - 14U pRBC, 14U FFP, 10 PLT, 11 CRYO, 500mL returned from cell saver, EBL 5L, UOP 1100mL  Recipient Info:   ABO: A  CMV:  Neg  EBV Positive  Donor:  Donor ID:  [id number] Match: [id number]  Age: 29 ABO:  A1 High Risk:   No COD: Cardiovascular  Anti CMV positive, EBV IgG- positive  HepBcAb-neg, Hepatitis C-DANA- neg, Hepatitis C ab-neg  #Tremors – mildly confused, c/f taco toxicity +/- prednisone and opioid effects. Holding tacro, level was 10 this AM. May need medication change based on clinical course  #Halluciniations: c/f prednisone toxicity with visual hallucinations   #GI bleed - pretransplant with acute blood loss anemia s/p 10U PRBCs, bleeding from known GAVE, Hb stable post-transplant, but reported melena 7/5 AM. Hgb now stable. GAVE and PHG should have improved w/ liver transplant, most likely etiology is NG tube trauma prior if any c/f GI bleed  # Hypertension on nifedipine now  # Lower ext edema: diuresing, on lasix  #R posterior heel wound w/ overlying eschar – on meropenem for 1 more day. Local wound care. no signs of infections, XR neg for gas, evaluated by podiatry and ID. MRI w/o contrast limited, but no overt signs of active infection.  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    Recommendation:  - remove lines/bob as able (per surgery/SICU)  - c/w cellcept, methylpred taper with monitoring mental status  - hold tacro given levels and c/f neurotoxicity  - glycemic control with insulin  - s/p course of meropenem  - c/w nystatin, Bactrim, valcyte ppx  - trend Hb- PO PPI BID, monitor bowel movements  - aspirin per surgery  - trend renal function  - advance diet, low Na diet  - f/u wound care/nursing for R heel ulcer  - f/u transplant surgery recs  - rest of care per SICU team  - transplant hepatology will follow 64 M hx of DM, HLD, GERD, HFpEF with mild LV diastolic dysfunction, and decompensated JEAN cirrhosis c/b ascites with hx of SBP, HE, duodenal ulcer, GAVE and duodenal AVM s/p APC (last on 10/11/19), UNOS listed for liver transplantation at [facility]. 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 He is now re-admitted with hepatic encephalopathy and VRE UTI, also with MISA on CKD. S/p liver transplant 7/2.    Impression:  #S/p liver transplant 7/2:  OR details:  - L groin cutdown for vv bypass   - anastomosis: bicaval end-end/CBD duct-duct/HA & PV end-end, all standard anatomy.    - IOC with good flow  - Simulect induction. 500mg Solumedrol  - JPs x3 with T-Tube  - 14U pRBC, 14U FFP, 10 PLT, 11 CRYO, 500mL returned from cell saver, EBL 5L, UOP 1100mL  Recipient Info:   ABO: A  CMV:  Neg  EBV Positive  Donor:  Donor ID:  [id number] Match: [id number]  Age: 29 ABO:  A1 High Risk:   No COD: Cardiovascular  Anti CMV positive, EBV IgG- positive  HepBcAb-neg, Hepatitis C-DANA- neg, Hepatitis C ab-neg  #Tremors – mildly confused, c/f taco toxicity +/- prednisone and opioid effects. Holding tacro, level was 10 this AM. May need medication change based on clinical course  #Halluciniations: c/f prednisone toxicity with visual hallucinations   #GI bleed - pretransplant with acute blood loss anemia s/p 10U PRBCs, bleeding from known GAVE, Hb stable post-transplant, but reported melena 7/5 AM. Hgb now stable. GAVE and PHG should have improved w/ liver transplant, most likely etiology is NG tube trauma prior if any c/f GI bleed  # Hypertension on nifedipine now  # Lower ext edema: diuresing, on lasix  #R posterior heel wound w/ overlying eschar – on meropenem for 1 more day. Local wound care. no signs of infections, XR neg for gas, evaluated by podiatry and ID. MRI w/o contrast limited, but no overt signs of active infection.  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

## 2020-07-07 NOTE — PROGRESS NOTE ADULT - SUBJECTIVE AND OBJECTIVE BOX
Infectious Disease Progress Note:     Interval History/ROS: Patient is a 64 year old male with past medical history of insulin dependent diabtes mellitus, hyperlipidemia, obesity, heart failure with preserved ejection fraction with mild LV diastolic dysfunction, MGUS, chronic anemia, duodenal ulcer, GAVE,  duodenal AVM s/p APC (last on 10/11/19) who presented with 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 The patient was re-admitted with hepatic encephalopathy and VRE UTI, also with MISA on CKD. S/p liver transplant 7/2. 6/14/20: blood cultures positive for VRE - resistant to vancomycin R > 16.    Today, the patient was comfortable, not in any pain, denies fevers, chills, night sweats, rash, cough, coryza, dysuria, or loose stools.    Allergies: Codeine (anaphylaxis)     Intolerances    ANTIMICROBIALS:    meropenem  IVPB 1000 every 8 hours  nystatin Suspension 321922 four times a day  trimethoprim   80 mG/sulfamethoxazole 400 mG 1 daily  valGANciclovir 450 daily      OTHER MEDS:  aspirin enteric coated 81 milliGRAM(s) Oral daily  chlorhexidine 2% Cloths 1 Application(s) Topical <User Schedule>  chlorhexidine 4% Liquid 1 Application(s) Topical <User Schedule>  collagenase Ointment 1 Application(s) Topical daily  HYDROmorphone  Injectable 0.5 milliGRAM(s) IV Push every 3 hours PRN  insulin glargine Injectable (LANTUS) 15 Unit(s) SubCutaneous at bedtime  insulin lispro (HumaLOG) corrective regimen sliding scale   SubCutaneous three times a day before meals  insulin lispro (HumaLOG) corrective regimen sliding scale   SubCutaneous at bedtime  labetalol Injectable 10 milliGRAM(s) IV Push every 1 hour PRN  methylPREDNISolone sodium succinate Injectable 20 milliGRAM(s) IV Push two times a day  methylPREDNISolone sodium succinate Injectable   IV Push   mycophenolate mofetil 1000 milliGRAM(s) Oral <User Schedule>  NIFEdipine XL 30 milliGRAM(s) Oral daily  nystatin Cream 1 Application(s) Topical two times a day  oxyCODONE    IR 5 milliGRAM(s) Oral every 4 hours PRN  oxyCODONE    IR 10 milliGRAM(s) Oral every 4 hours PRN  pantoprazole  Injectable 40 milliGRAM(s) IV Push every 12 hours  sodium chloride 0.9% lock flush 10 milliLiter(s) IV Push every 1 hour PRN  tamsulosin 0.4 milliGRAM(s) Oral at bedtime      Vital Signs Last 24 Hrs  T(C): 36.6 (06 Jul 2020 11:00), Max: 36.8 (05 Jul 2020 15:00)  T(F): 97.9 (06 Jul 2020 11:00), Max: 98.2 (05 Jul 2020 15:00)  HR: 78 (06 Jul 2020 11:00) (74 - 124)  BP: 119/60 (06 Jul 2020 11:00) (103/59 - 151/70)  BP(mean): 84 (06 Jul 2020 11:00) (78 - 100)  RR: 18 (06 Jul 2020 11:00) (11 - 26)  SpO2: 100% (06 Jul 2020 11:00) (90% - 100%)    EXAM:  Constitutional: Not in acute distress.   Eyes: No icterus. Pupils b/l reactive to light.  Oral cavity: Clear, no lesions  Neck: No neck vein distension noted  RS: Chest clear to auscultation bilaterally. No wheeze/rhonchi/crepitations.  CVS: S1, S2 heard. Regular rate and rhythm. No murmurs/rubs/gallops.  Abdomen: Soft. No guarding/rigidity/tenderness.  : No acute abnormalities  Extremities: no erythema, purulence, or edema in right ankle   Skin: No lesions noted  Vascular: No evidence of phlebitis  Neuro: Alert, oriented to time/place/person                        9.5    7.03  )-----------( 90       ( 06 Jul 2020 02:04 )             28.6       07-06    132<L>  |  98  |  42<H>  ----------------------------<  224<H>  4.2   |  27  |  1.02    Ca    9.0      06 Jul 2020 02:05  Phos  2.6     07-06  Mg     1.8     07-06    TPro  5.1<L>  /  Alb  3.1<L>  /  TBili  1.7<H>  /  DBili  0.8<H>  /  AST  43<H>  /  ALT  162<H>  /  AlkPhos  78  07-06    MICROBIOLOGY:Culture Results:   No growth to date. (07-03 @ 11:00)  Culture Results:   No growth to date. (07-03 @ 11:00)  Culture Results:   No growth (07-03 @ 10:11)  Culture Results:   Testing in progress (07-03 @ 01:05)  Culture Results:   No growth (07-03 @ 01:05)      RADIOLOGY:    6/18/20:  MRI right ankle with and without contrast: Evaluation for soft tissue infection is limited without intravenous contrast. Although there is heterogeneous marrow signal there is no skin ulceration, sinus tract or abscess identified with adjacent marrow signal abnormality suggest osteomyelitis.  There is edema within the abductor hallucis and flexor digitorum brevis muscles. There is minimal edema and marked atrophy within the adductor digit minimi muscle. These findings are likely related to denervation edema. The tendons at the ankle and hindfoot are intact. There is mild flexor hallucis longus tenosynovitis. There is mild spurring at the Achilles insertion. There is marked thickening at the middle cord of the plantar fascia which contains a small ossicle which is chronic. There is also focal thickening at the proximal lateral cortex of the plantar fascia likely related to old injury.   There is marked spurring dorsally at the second tarsometatarsal joint. There is edema within the sinus Tarsi with scarring.   The Lisfranc ligament is at the edge of the field although the interosseous ligament is grossly intact. The dorsal component is markedly thickened and chronically degenerated or torn. The distal syndesmotic ligaments are intact. The lateral collateral ligaments are intact. There is thickening at the deltoid ligament which is continuous.  Impression: Evaluation for soft tissue infection is limited without intravenous contrast. Heterogeneous marrow signal without an adjacent skin ulceration or fluid collection to suggest osteomyelitis.    OTHER INVESTIGATIONS:      Assessment:  The patient is a 64 year old male with PMHx of insulin dependent diabetes mellitus, hyperlipidemia, obesity, heart failure with preserved ejection fraction with mild LV diastolic dysfunction, MGUS, chronic anemia, duodenal ulcer, GAVE,  duodenal AVM s/p APC (last on 10/11/19) who presented with 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 The patient has a history of multiple recent hospitalizations due to UTIs, emphysematous cystitis and prostate abscess. The patient was re-admitted with hepatic encephalopathy and VRE UTI, also with MISA on CKD. S/p liver transplant 7/2/20.     1) Liver transplant recipient  Continue bactrim, valcyte for prophylaxis   Prograf increased to 2 mg BID by primary team   On MMF and prednisone taper     2) Pancytopenia  Monitor CBC daily     3) Right foot ulcer   Meropenem discontinued today  No erythema or purulence    Wound care daily  Podiatry on board     Infectious disease will continue to follow.      Charlie Lafleur MD PGY-4   Fellow, Infectious Diseases   Pager: [phone]  If no response, after 5pm and on weekends: Call [phone]            Charlie Lafleur MD PGY-4   Fellow, Infectious Diseases  Pager: [phone]  If no response, after 5pm and on Weekends: Call [phone] Infectious Disease Progress Note:     Interval History/ROS: Patient is a 64 year old male with past medical history of insulin dependent diabtes mellitus, hyperlipidemia, obesity, heart failure with preserved ejection fraction with mild LV diastolic dysfunction, MGUS, chronic anemia, duodenal ulcer, GAVE,  duodenal AVM s/p APC (last on 10/11/19) who presented with 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 The patient was re-admitted with hepatic encephalopathy and VRE UTI, also with MISA on CKD. S/p liver transplant 7/2. 6/14/20: blood cultures positive for VRE - resistant to vancomycin R > 16.    Today, the patient was comfortable, not in any pain, denies fevers, chills, night sweats, rash, cough, coryza, dysuria, or loose stools. Having visual hallucinations - noting bugs crawling on him and in flying in the room.     REVIEW OF SYSTEMS  [  ] ROS unobtainable because:    [ x ] All other systems negative except as noted below    Constitutional:  [ ] fever [ ] chills  [ ] weight loss  [ ] weakness  Skin:  [ ] rash [ ] phlebitis	  Eyes: [ ] icterus [ ] pain  [ ] discharge	  ENMT: [ ] sore throat  [ ] thrush [ ] ulcers [ ] exudates  Respiratory: [ ] dyspnea [ ] hemoptysis [ ] cough [ ] sputum	  Cardiovascular:  [ ] chest pain [ ] palpitations [ ] edema	  Gastrointestinal:  [ ] nausea [ ] vomiting [ ] diarrhea [ ] constipation [ ] pain	  Genitourinary:  [ ] dysuria [ ] frequency [ ] hematuria [ ] discharge [ ] flank pain  [ ] incontinence  Musculoskeletal:  [ ] myalgias [ ] arthralgias [ ] arthritis  [ ] back pain  Neurological:  [ ] headache [ ] seizures  [ x] confusion/hallucinations    Allergies: Codeine (anaphylaxis)     Intolerances    ANTIMICROBIALS:    meropenem  IVPB 1000 every 8 hours  nystatin Suspension [id number] four times a day  trimethoprim   80 mG/sulfamethoxazole 400 mG 1 daily  valGANciclovir 450 daily      OTHER MEDS:  aspirin enteric coated 81 milliGRAM(s) Oral daily  chlorhexidine 2% Cloths 1 Application(s) Topical <User Schedule>  chlorhexidine 4% Liquid 1 Application(s) Topical <User Schedule>  collagenase Ointment 1 Application(s) Topical daily  HYDROmorphone  Injectable 0.5 milliGRAM(s) IV Push every 3 hours PRN  insulin glargine Injectable (LANTUS) 15 Unit(s) SubCutaneous at bedtime  insulin lispro (HumaLOG) corrective regimen sliding scale   SubCutaneous three times a day before meals  insulin lispro (HumaLOG) corrective regimen sliding scale   SubCutaneous at bedtime  labetalol Injectable 10 milliGRAM(s) IV Push every 1 hour PRN  methylPREDNISolone sodium succinate Injectable 20 milliGRAM(s) IV Push two times a day  methylPREDNISolone sodium succinate Injectable   IV Push   mycophenolate mofetil 1000 milliGRAM(s) Oral <User Schedule>  NIFEdipine XL 30 milliGRAM(s) Oral daily  nystatin Cream 1 Application(s) Topical two times a day  oxyCODONE    IR 5 milliGRAM(s) Oral every 4 hours PRN  oxyCODONE    IR 10 milliGRAM(s) Oral every 4 hours PRN  pantoprazole  Injectable 40 milliGRAM(s) IV Push every 12 hours  sodium chloride 0.9% lock flush 10 milliLiter(s) IV Push every 1 hour PRN  tamsulosin 0.4 milliGRAM(s) Oral at bedtime      Vital Signs Last 24 Hrs  T(C): 36.6 (06 Jul 2020 11:00), Max: 36.8 (05 Jul 2020 15:00)  T(F): 97.9 (06 Jul 2020 11:00), Max: 98.2 (05 Jul 2020 15:00)  HR: 78 (06 Jul 2020 11:00) (74 - 124)  BP: 119/60 (06 Jul 2020 11:00) (103/59 - 151/70)  BP(mean): 84 (06 Jul 2020 11:00) (78 - 100)  RR: 18 (06 Jul 2020 11:00) (11 - 26)  SpO2: 100% (06 Jul 2020 11:00) (90% - 100%)    EXAM:  Constitutional: Not in acute distress.   Eyes: No icterus. Pupils b/l reactive to light.  Oral cavity: Clear, no lesions  Neck: No neck vein distension noted  RS: Chest clear to auscultation bilaterally. No wheeze/rhonchi/crepitations.  CVS: S1, S2 heard. Regular rate and rhythm. No murmurs/rubs/gallops.  Abdomen: Soft. No guarding/rigidity/tenderness.  : No acute abnormalities  Extremities: R heel with overlying dressing. no erythema, purulence, or edema in right ankle   Skin: No lesions noted  Vascular: No evidence of phlebitis  Neuro: Upper extremity and lower extremity tremor. Alert, oriented to time/place/person                        9.5    7.03  )-----------( 90       ( 06 Jul 2020 02:04 )             28.6       07-06    132<L>  |  98  |  42<H>  ----------------------------<  224<H>  4.2   |  27  |  1.02    Ca    9.0      06 Jul 2020 02:05  Phos  2.6     07-06  Mg     1.8     07-06    TPro  5.1<L>  /  Alb  3.1<L>  /  TBili  1.7<H>  /  DBili  0.8<H>  /  AST  43<H>  /  ALT  162<H>  /  AlkPhos  78  07-06    MICROBIOLOGY:Culture Results:   No growth to date. (07-03 @ 11:00)  Culture Results:   No growth to date. (07-03 @ 11:00)  Culture Results:   No growth (07-03 @ 10:11)  Culture Results:   Testing in progress (07-03 @ 01:05)  Culture Results:   No growth (07-03 @ 01:05)      RADIOLOGY:    <The imaging below has been reviewed and visualized by me independently. Findings as detailed in report below>    6/18/20:  MRI right ankle with and without contrast: Evaluation for soft tissue infection is limited without intravenous contrast. Although there is heterogeneous marrow signal there is no skin ulceration, sinus tract or abscess identified with adjacent marrow signal abnormality suggest osteomyelitis.  There is edema within the abductor hallucis and flexor digitorum brevis muscles. There is minimal edema and marked atrophy within the adductor digit minimi muscle. These findings are likely related to denervation edema. The tendons at the ankle and hindfoot are intact. There is mild flexor hallucis longus tenosynovitis. There is mild spurring at the Achilles insertion. There is marked thickening at the middle cord of the plantar fascia which contains a small ossicle which is chronic. There is also focal thickening at the proximal lateral cortex of the plantar fascia likely related to old injury.   There is marked spurring dorsally at the second tarsometatarsal joint. There is edema within the sinus Tarsi with scarring.   The Lisfranc ligament is at the edge of the field although the interosseous ligament is grossly intact. The dorsal component is markedly thickened and chronically degenerated or torn. The distal syndesmotic ligaments are intact. The lateral collateral ligaments are intact. There is thickening at the deltoid ligament which is continuous.  Impression: Evaluation for soft tissue infection is limited without intravenous contrast. Heterogeneous marrow signal without an adjacent skin ulceration or fluid collection to suggest osteomyelitis.

## 2020-07-07 NOTE — PROGRESS NOTE ADULT - ATTENDING COMMENTS
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 s/p treatment course for possible VRE UTI. Now s/p OLT.     Had pancytopenia and required Filgrastim on 6/30  s/p OLT on 7/2/20    Noted to have some drainage from right heel so danilo-op Dapto and Lachelle were extended  No evidence of skin/soft tissue infection on exam today  No drainage, erythema or underlying fluctuance was appreciated at the right heel  MRI prior to transplant without obvious infection  Favor monitoring off of antibiotics at this time.     I will continue to follow. Please feel free to contact me with any further questions.    Eusebio Pérez M.D.  [facility] Division of Infectious Disease  8AM-5PM: Pager Number [phone]  After Hours (or if no response): Please contact the Infectious Diseases Office at [phone]     The above assessment and plan were discussed with [facility] PA.

## 2020-07-07 NOTE — PROGRESS NOTE ADULT - ATTENDING COMMENTS
63 yo M with HLD, IDDM, GERD, HFpEF with mild LV diastolic dysfunction, chronic right foot ulcer, recurrent UTIs, and decompensated JEAN cirrhosis, s/p OLT 7/2 (standard criteria donor, standard vascular anastomoses with duct-to-duct biliary anastomosis over T-tube, CMV D+/R-, EBV D+/R+, received Simulect induction).    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 Methylprednislone decreased to 10 mg iv bid today as per protocol, and will monitor mental status for improvement with further tapering. Continuing tacrolimus for now adjusted based on trough levels, but if symptoms persist even on low dose steroids then may eventually need to switch to cyclosporine. Continuing prophylaxis with nystatin, Valcyte, and Bactrim.    Chronic right foot ulcer does not appear infected, completing meropenem today.    Renal function is stable, and continuing with diuresis.    Family (wife Ada) was updated this afternoon by telephone.    Please don't hesitate to call with any questions/concerns.    Letitia Mo M.D., Ph.D.  Transplant Hepatology  Cell: [phone]

## 2020-07-07 NOTE — CONSULT NOTE ADULT - PROBLEM SELECTOR RECOMMENDATION 9
-Goal -180 but patient above goal as he was transitioned off the insulin gtt without basal insulin in the setting of high-dose steroid use.  -Recommend Basal/bolus insulin as thus: Lantus 16 units sq qhs and humalog 10 units sq tid-ac  -Continue moderate scale tid-ac  DISPO: basal/bolus, with doses TBD  -Pt can resume f/u with Dr. Mathur or come to our practice

## 2020-07-07 NOTE — PROGRESS NOTE ADULT - SUBJECTIVE AND OBJECTIVE BOX
INTERVAL HPI/OVERNIGHT EVENTS:    events noted  tremulous  periods of confusion      Allergies    codeine (Anaphylaxis)    Intolerances    General:  No wt loss, fevers, chills, night sweats, fatigue,   Eyes:  Good vision, no reported pain  ENT:  No sore throat, pain, runny nose, dysphagia  CV:  No pain, palpitations, hypo/hypertension  Resp:  No dyspnea, cough, tachypnea, wheezing  GI:  No pain, No nausea, No vomiting, No diarrhea, No constipation, No weight loss, No fever, No pruritis, No rectal bleeding, No tarry stools, No dysphagia,  :  No pain, bleeding, incontinence, nocturia  Muscle:  No pain, weakness  Neuro:  No weakness, tingling, memory problems  Psych:  No fatigue, insomnia, mood problems, depression  Endocrine:  No polyuria, polydipsia, cold/heat intolerance  Heme:  No petechiae, ecchymosis, easy bruisability  Skin:  No rash, tattoos, scars, edema    PHYSICAL EXAM:   Vital Signs:  Vital Signs Last 24 Hrs  T(C): 36.7 (14 Jun 2020 07:53), Max: 36.7 (14 Jun 2020 07:53)  T(F): 98.1 (14 Jun 2020 07:53), Max: 98.1 (14 Jun 2020 07:53)  HR: 79 (14 Jun 2020 07:53) (75 - 80)  BP: 127/77 (14 Jun 2020 07:53) (108/53 - 133/68)  BP(mean): --  RR: 18 (14 Jun 2020 07:53) (17 - 18)  SpO2: 97% (14 Jun 2020 07:53) (97% - 100%)  Daily     Daily I&O's Summary    13 Jun 2020 07:01  -  14 Jun 2020 07:00  --------------------------------------------------------  IN: 350 mL / OUT: 250 mL / NET: 100 mL    14 Jun 2020 07:01  -  14 Jun 2020 12:30  --------------------------------------------------------  IN: 0 mL / OUT: 350 mL / NET: -350 mL        GENERAL:  no distress  HEENT:  NC/AT   ABDOMEN:  Soft, non-tender, non-distended, normoactive bowel sounds  EXTEREMITIES:  + edema  SKIN:  No rash/erythema/ecchymoses/petechiae/wounds/abscess/warm/dry  NEURO:  awake, alert, oriented, +asterixis      LABS:                        9.0    5.07  )-----------( 68       ( 14 Jun 2020 07:17 )             26.4     06-14    131<L>  |  100  |  27<H>  ----------------------------<  141<H>  5.1   |  20<L>  |  1.74<H>    Ca    10.5      14 Jun 2020 07:17    TPro  6.3  /  Alb  3.8  /  TBili  11.0<H>  /  DBili  x   /  AST  24  /  ALT  15  /  AlkPhos  133<H>  06-14    PT/INR - ( 14 Jun 2020 09:19 )   PT: 26.3 sec;   INR: 2.23 ratio         PTT - ( 14 Jun 2020 09:19 )  PTT:44.2 sec    amylase   lipase  RADIOLOGY & ADDITIONAL TESTS:

## 2020-07-07 NOTE — PROGRESS NOTE ADULT - SUBJECTIVE AND OBJECTIVE BOX
Transplant Surgery - Multidisciplinary Rounds  --------------------------------------------------------------  OLTx      Date:  7/2/2020       POD#5    Present: Patient seen with multidisciplinary team including Transplant Surgeon: Dr. Lucio.  Transplant Hepatologist Dr. Mo, NP Xavi during am rounds and examined with Dr. Lucio. Disciplines not in attendance will be notified of the plan.     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     Re-admitted on 6/11:   ·	worsening HE  ·	UTI/VRE: tx with linezolid 6/15-22, bactrim DS 6/22 - 7/2  ·	MISA/Hyponatremia  ·	UGI bleed (melena) s/p EGD (6/22) c/w hemorrhagic duodenitis/gastritis; Grade 2 EV; requiring multiple transfusions  ·	Known h/o R foot ulcer (MRI neg for OM)  ·	s/p OLTx on 7/2/2020, post op liver doppler with patent vessels    OR details:  - L groin cutdown for vv bypass   - anastomosis: bicaval end-end/CBD duct-duct/HA & PV end-end, all standard anatomy.    - IOC with good flow  - Simulect induction. 500mg Solumedrol  - JPs x3 with T-Tube  - 14U pRBC, 14U FFP, 10 PLT, 11 CRYO, 500mL returned from cell saver, EBL 5L, UOP 1100mL    Recipient Info: ABO: A  CMV:  Neg  EBV Positive    Donor:  Donor ID:  [id number] Match: [id number]  Age: 29 ABO:  A1 High Risk:   No COD: Cardiovascular  Anti CMV positive, EBV IgG- positive  HepBcAb-neg, Hepatitis C-DANA- neg, Hepatitis C ab-neg    Interval events:  - POD 5 s/p OLT; good graft function, T bili 1.6   INR 1.15  - upper body tremors persist on exam.  Tacro Level 5.7 yesterday.  FK 3mg continued with steroid taper    Mag 2.0  - Confused overnight but easily reoriented. A&Ox3 this am.   - Brown Liquid BM yesterday, no blood noted.  H/H stable 10.1/29.7 from 10.1/29.6  - ASA 81mg started  - ANDREINA #2 removed  - Drains: T tube (zero), JP1 (250cc SS)  - LE edema: Lasix 40mg IVP x 1 dose    Potential discharge date: pending clinical improvement    Education: Medication    Plan of Care: See below    MEDICATIONS  (STANDING):  aspirin enteric coated 81 milliGRAM(s) Oral daily  chlorhexidine 2% Cloths 1 Application(s) Topical <User Schedule>  chlorhexidine 4% Liquid 1 Application(s) Topical <User Schedule>  collagenase Ointment 1 Application(s) Topical daily  insulin lispro (HumaLOG) corrective regimen sliding scale   SubCutaneous three times a day before meals  insulin lispro (HumaLOG) corrective regimen sliding scale   SubCutaneous at bedtime  magnesium oxide 400 milliGRAM(s) Oral two times a day with meals  methylPREDNISolone sodium succinate Injectable 10 milliGRAM(s) IV Push two times a day  methylPREDNISolone sodium succinate Injectable   IV Push   mycophenolate mofetil 1000 milliGRAM(s) Oral <User Schedule>  NIFEdipine XL 30 milliGRAM(s) Oral daily  nystatin    Suspension 340681 Unit(s) Swish and Swallow four times a day  nystatin Cream 1 Application(s) Topical two times a day  pantoprazole  Injectable 40 milliGRAM(s) IV Push every 12 hours  tacrolimus 2 milliGRAM(s) Oral <User Schedule>  tamsulosin 0.4 milliGRAM(s) Oral at bedtime  trimethoprim   80 mG/sulfamethoxazole 400 mG 1 Tablet(s) Oral daily  valGANciclovir 450 milliGRAM(s) Oral daily    MEDICATIONS  (PRN):  labetalol Injectable 10 milliGRAM(s) IV Push every 1 hour PRN systolic blood pressure GREATER THAN 170 mmHg or diastolic blood pressure GREATER THAN 100 mmHg  oxyCODONE    IR 5 milliGRAM(s) Oral every 4 hours PRN Moderate Pain (4 - 6)  oxyCODONE    IR 10 milliGRAM(s) Oral every 4 hours PRN Severe Pain (7 - 10)  sodium chloride 0.9% lock flush 10 milliLiter(s) IV Push every 1 hour PRN Pre/post blood products, medications, blood draw, and to maintain line patency      PAST MEDICAL & SURGICAL HISTORY:  GIB (gastrointestinal bleeding)  GERD with esophagitis: Gastritis &amp; Non Bleeding Ulcers  Hepatic encephalopathy  Obesity  Fatty liver disease, nonalcoholic  Renal stones: 25 years ago  Hypertension  Neuropathy  Hypercholesteremia  Diabetes  S/P cholecystectomy      Vital Signs Last 24 Hrs  T(C): 36.5 (07 Jul 2020 07:00), Max: 36.8 (06 Jul 2020 19:00)  T(F): 97.7 (07 Jul 2020 07:00), Max: 98.2 (06 Jul 2020 19:00)  HR: 81 (07 Jul 2020 10:18) (77 - 138)  BP: 104/53 (07 Jul 2020 10:18) (104/53 - 152/67)  BP(mean): 87 (07 Jul 2020 09:00) (80 - 127)  RR: 23 (07 Jul 2020 09:00) (15 - 37)  SpO2: 99% (07 Jul 2020 10:18) (83% - 100%)    I&O's Summary    06 Jul 2020 07:01  -  07 Jul 2020 07:00  --------------------------------------------------------  IN: 767 mL / OUT: 2770 mL / NET: -2003 mL                              10.1   4.71  )-----------( 81       ( 07 Jul 2020 01:12 )             29.7     07-07    131<L>  |  97  |  38<H>  ----------------------------<  285<H>  4.4   |  26  |  1.05    Ca    9.0      07 Jul 2020 01:12  Phos  2.0     07-07  Mg     2.0     07-07    TPro  5.0<L>  /  Alb  3.1<L>  /  TBili  1.6<H>  /  DBili  0.7<H>  /  AST  46<H>  /  ALT  139<H>  /  AlkPhos  117  07-07    Tacrolimus (), Serum: 10.4 ng/mL (07-07 @ 07:58)        Culture - Blood (collected 07-03-20 @ 11:00)  Source: .Blood Blood  Preliminary Report (07-04-20 @ 12:01):    No growth to date.    Culture - Blood (collected 07-03-20 @ 11:00)  Source: .Blood Blood  Preliminary Report (07-04-20 @ 12:01):    No growth to date.    Culture - Urine (collected 07-03-20 @ 10:11)  Source: .Urine Catheterized  Final Report (07-04-20 @ 07:24):    No growth    Culture - Surgical Swab (collected 07-03-20 @ 01:05)  Source: .Surgical Swab abdominal ascites culture  Preliminary Report (07-03-20 @ 18:12):    No growth    Culture - Fungal, Other (collected 07-03-20 @ 01:05)  Source: .Other Other  Preliminary Report (07-06-20 @ 07:35):    Testing in progress      Review of systems  Gen: fatigue/weakness/confusion  Skin: no rashes  Head/Eyes/Ears/Mouth: no headache, no changes in vision  Respiratory: no SOB  CV: no chest pain  GI: no constopation  : voiding  MSK: + LE edema  Neuro:  no seizures, + tremulous  Heme: no easy bruising  Endo: no heat/cold intolerance  Psych: no significant nervousness, anxiety, stress  All other systems were reviewed and are negative, except as noted     PHYSICAL EXAM:  Constitutional: Well developed / well nourished  Eyes: mild icterus  ENMT: NC/AT  Neck: supple  Respiratory: CTA b/l  Cardiovascular: RRR  Gastrointestinal: Soft abdomen, mild distended; + incisional tenderness, incision c/d/i, staples intact, T-tube w/ no drainage.  JPs x2--all ss  Genitourinary: voiding spontaneously   Extremities: SCD's in place and working bilaterally. L groin cutdown site c/d/i  Vascular: Palpable dp pulses bilaterally  Neurological: AO x 2, upper extrem tremors  Skin: no rashes, ulcerations, lesions post-op  Psychiatric: awake, alert and responsive Transplant Surgery - Multidisciplinary Rounds  --------------------------------------------------------------  OLTx      Date:  7/2/2020       POD#5    Present: Patient seen with multidisciplinary team including Transplant Surgeon: Dr. Lucio.  Transplant Hepatologist Dr. Mo, NP Xavi during am rounds and examined with Dr. Lucio. Disciplines not in attendance will be notified of the plan.     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     Re-admitted on 6/11:   ·	worsening HE  ·	UTI/VRE: tx with linezolid 6/15-22, bactrim DS 6/22 - 7/2  ·	MISA/Hyponatremia  ·	UGI bleed (melena) s/p EGD (6/22) c/w hemorrhagic duodenitis/gastritis; Grade 2 EV; requiring multiple transfusions  ·	Known h/o R foot ulcer (MRI neg for OM)  ·	s/p OLTx on 7/2/2020, post op liver doppler with patent vessels    OR details:  - L groin cutdown for vv bypass   - anastomosis: bicaval end-end/CBD duct-duct/HA & PV end-end, all standard anatomy.    - IOC with good flow  - Simulect induction. 500mg Solumedrol  - JPs x3 with T-Tube  - 14U pRBC, 14U FFP, 10 PLT, 11 CRYO, 500mL returned from cell saver, EBL 5L, UOP 1100mL    Recipient Info: ABO: A  CMV:  Neg  EBV Positive    Donor:  Donor ID:  [id number] Match: [id number]  Age: 29 ABO:  A1 High Risk:   No COD: Cardiovascular  Anti CMV positive, EBV IgG- positive  HepBcAb-neg, Hepatitis C-DANA- neg, Hepatitis C ab-neg    Interval events:  - POD 5 s/p OLT; good graft function, T bili 1.6   INR 1.15  - upper body tremors persist on exam.  Tacro Level 5.7 yesterday.  FK 3mg continued with steroid taper    Mag 2.0  - Confused overnight but easily reoriented. A&Ox3 this am.   - Brown Liquid BM yesterday, no blood noted.  H/H stable 10.1/29.7 from 10.1/29.6  - ASA 81mg started  - ANDREINA #2 removed  - Drains: T tube (zero), JP1 (250cc SS)  - LE edema: Lasix 40mg IVP x 1 dose    Potential discharge date: pending clinical improvement    Education: Medication    Plan of Care: See below    MEDICATIONS  (STANDING):  aspirin enteric coated 81 milliGRAM(s) Oral daily  chlorhexidine 2% Cloths 1 Application(s) Topical <User Schedule>  chlorhexidine 4% Liquid 1 Application(s) Topical <User Schedule>  collagenase Ointment 1 Application(s) Topical daily  insulin lispro (HumaLOG) corrective regimen sliding scale   SubCutaneous three times a day before meals  insulin lispro (HumaLOG) corrective regimen sliding scale   SubCutaneous at bedtime  magnesium oxide 400 milliGRAM(s) Oral two times a day with meals  methylPREDNISolone sodium succinate Injectable 10 milliGRAM(s) IV Push two times a day  methylPREDNISolone sodium succinate Injectable   IV Push   mycophenolate mofetil 1000 milliGRAM(s) Oral <User Schedule>  NIFEdipine XL 30 milliGRAM(s) Oral daily  nystatin    Suspension [id number] Unit(s) Swish and Swallow four times a day  nystatin Cream 1 Application(s) Topical two times a day  pantoprazole  Injectable 40 milliGRAM(s) IV Push every 12 hours  tacrolimus 2 milliGRAM(s) Oral <User Schedule>  tamsulosin 0.4 milliGRAM(s) Oral at bedtime  trimethoprim   80 mG/sulfamethoxazole 400 mG 1 Tablet(s) Oral daily  valGANciclovir 450 milliGRAM(s) Oral daily    MEDICATIONS  (PRN):  labetalol Injectable 10 milliGRAM(s) IV Push every 1 hour PRN systolic blood pressure GREATER THAN 170 mmHg or diastolic blood pressure GREATER THAN 100 mmHg  oxyCODONE    IR 5 milliGRAM(s) Oral every 4 hours PRN Moderate Pain (4 - 6)  oxyCODONE    IR 10 milliGRAM(s) Oral every 4 hours PRN Severe Pain (7 - 10)  sodium chloride 0.9% lock flush 10 milliLiter(s) IV Push every 1 hour PRN Pre/post blood products, medications, blood draw, and to maintain line patency      PAST MEDICAL & SURGICAL HISTORY:  GIB (gastrointestinal bleeding)  GERD with esophagitis: Gastritis &amp; Non Bleeding Ulcers  Hepatic encephalopathy  Obesity  Fatty liver disease, nonalcoholic  Renal stones: 25 years ago  Hypertension  Neuropathy  Hypercholesteremia  Diabetes  S/P cholecystectomy      Vital Signs Last 24 Hrs  T(C): 36.5 (07 Jul 2020 07:00), Max: 36.8 (06 Jul 2020 19:00)  T(F): 97.7 (07 Jul 2020 07:00), Max: 98.2 (06 Jul 2020 19:00)  HR: 81 (07 Jul 2020 10:18) (77 - 138)  BP: 104/53 (07 Jul 2020 10:18) (104/53 - 152/67)  BP(mean): 87 (07 Jul 2020 09:00) (80 - 127)  RR: 23 (07 Jul 2020 09:00) (15 - 37)  SpO2: 99% (07 Jul 2020 10:18) (83% - 100%)    I&O's Summary    06 Jul 2020 07:01  -  07 Jul 2020 07:00  --------------------------------------------------------  IN: 767 mL / OUT: 2770 mL / NET: -2003 mL                              10.1   4.71  )-----------( 81       ( 07 Jul 2020 01:12 )             29.7     07-07    131<L>  |  97  |  38<H>  ----------------------------<  285<H>  4.4   |  26  |  1.05    Ca    9.0      07 Jul 2020 01:12  Phos  2.0     07-07  Mg     2.0     07-07    TPro  5.0<L>  /  Alb  3.1<L>  /  TBili  1.6<H>  /  DBili  0.7<H>  /  AST  46<H>  /  ALT  139<H>  /  AlkPhos  117  07-07    Tacrolimus (), Serum: 10.4 ng/mL (07-07 @ 07:58)        Culture - Blood (collected 07-03-20 @ 11:00)  Source: .Blood Blood  Preliminary Report (07-04-20 @ 12:01):    No growth to date.    Culture - Blood (collected 07-03-20 @ 11:00)  Source: .Blood Blood  Preliminary Report (07-04-20 @ 12:01):    No growth to date.    Culture - Urine (collected 07-03-20 @ 10:11)  Source: .Urine Catheterized  Final Report (07-04-20 @ 07:24):    No growth    Culture - Surgical Swab (collected 07-03-20 @ 01:05)  Source: .Surgical Swab abdominal ascites culture  Preliminary Report (07-03-20 @ 18:12):    No growth    Culture - Fungal, Other (collected 07-03-20 @ 01:05)  Source: .Other Other  Preliminary Report (07-06-20 @ 07:35):    Testing in progress      Review of systems  Gen: fatigue/weakness/confusion  Skin: no rashes  Head/Eyes/Ears/Mouth: no headache, no changes in vision  Respiratory: no SOB  CV: no chest pain  GI: no constipation, nausea or vomiting  : voiding  MSK: + LE edema  Neuro:  no seizures, + tremulous  Heme: no easy bruising  Endo: no heat/cold intolerance  Psych: no significant nervousness, anxiety, stress  All other systems were reviewed and are negative, except as noted     PHYSICAL EXAM:  Constitutional: Well developed / well nourished  Eyes: mild icterus  ENMT: NC/AT  Neck: supple  Respiratory: CTA b/l  Cardiovascular: RRR  Gastrointestinal: Soft abdomen, mildly distended; + mild incisional tenderness, incision c/d/i, staples intact, T-tube w/ no drainage.  JPs x 1 with ss output  Genitourinary: voiding spontaneously/condom cath  Extremities: SCD's in place and working bilaterally. L groin cutdown site c/d/i  Vascular: Palpable dp pulses bilaterally  Neurological: AO x 2, upper body tremors  Skin: no rashes, ulcerations, lesions post-op  Psychiatric: awake, alert and responsive

## 2020-07-07 NOTE — PROGRESS NOTE ADULT - SUBJECTIVE AND OBJECTIVE BOX
Follow Up:      Interval History:    REVIEW OF SYSTEMS  [  ] ROS unobtainable because:    [  ] All other systems negative except as noted below    Constitutional:  [ ] fever [ ] chills  [ ] weight loss  [ ] weakness  Skin:  [ ] rash [ ] phlebitis	  Eyes: [ ] icterus [ ] pain  [ ] discharge	  ENMT: [ ] sore throat  [ ] thrush [ ] ulcers [ ] exudates  Respiratory: [ ] dyspnea [ ] hemoptysis [ ] cough [ ] sputum	  Cardiovascular:  [ ] chest pain [ ] palpitations [ ] edema	  Gastrointestinal:  [ ] nausea [ ] vomiting [ ] diarrhea [ ] constipation [ ] pain	  Genitourinary:  [ ] dysuria [ ] frequency [ ] hematuria [ ] discharge [ ] flank pain  [ ] incontinence  Musculoskeletal:  [ ] myalgias [ ] arthralgias [ ] arthritis  [ ] back pain  Neurological:  [ ] headache [ ] seizures  [ ] confusion/altered mental status    Allergies  codeine (Anaphylaxis)        ANTIMICROBIALS:  nystatin    Suspension [id number] four times a day  trimethoprim   80 mG/sulfamethoxazole 400 mG 1 daily  valGANciclovir 450 daily      OTHER MEDS:  MEDICATIONS  (STANDING):  aspirin enteric coated 81 daily  insulin glargine Injectable (LANTUS) 16 at bedtime  insulin lispro (HumaLOG) corrective regimen sliding scale  three times a day before meals  insulin lispro (HumaLOG) corrective regimen sliding scale  at bedtime  insulin lispro Injectable (HumaLOG) 10 three times a day before meals  labetalol Injectable 10 every 1 hour PRN  methylPREDNISolone sodium succinate Injectable 10 two times a day  methylPREDNISolone sodium succinate Injectable    mycophenolate mofetil 1000 <User Schedule>  NIFEdipine XL 30 daily  oxyCODONE    IR 5 every 4 hours PRN  oxyCODONE    IR 10 every 4 hours PRN  pantoprazole  Injectable 40 every 12 hours  tacrolimus 3 <User Schedule>  tamsulosin 0.4 at bedtime      Vital Signs Last 24 Hrs  T(C): 36.7 (07 Jul 2020 15:00), Max: 36.8 (06 Jul 2020 19:00)  T(F): 98.1 (07 Jul 2020 15:00), Max: 98.2 (06 Jul 2020 19:00)  HR: 80 (07 Jul 2020 15:00) (77 - 163)  BP: 126/72 (07 Jul 2020 15:00) (104/53 - 152/67)  BP(mean): 91 (07 Jul 2020 15:00) (73 - 127)  RR: 23 (07 Jul 2020 15:00) (15 - 25)  SpO2: 91% (07 Jul 2020 15:00) (83% - 99%)    PHYSICAL EXAMINATION:  General: Alert and Awake, NAD  HEENT: PERRL, EOMI  Neck: Supple  Cardiac: RRR, No M/R/G  Resp: CTAB, No Wh/Rh/Ra  Abdomen: NBS, NT/ND, No HSM, No rigidity or guarding  MSK: No LE edema. No Calf tenderness  : No bob  Skin: No rashes or lesions. Skin is warm and dry to the touch.   Neuro: Alert and Awake. CN 2-12 Grossly intact. Moves all four extremities spontaneously.  Psych: Calm, Pleasant, Cooperative                          10.7   5.43  )-----------( 78       ( 07 Jul 2020 15:01 )             32.2       07-07    131<L>  |  95<L>  |  35<H>  ----------------------------<  171<H>  4.1   |  22  |  1.07    Ca    8.8      07 Jul 2020 15:01  Phos  2.7     07-07  Mg     1.8     07-07    TPro  5.1<L>  /  Alb  3.3  /  TBili  1.9<H>  /  DBili  0.9<H>  /  AST  63<H>  /  ALT  148<H>  /  AlkPhos  113  07-07          MICROBIOLOGY:  v  .Blood Blood  07-03-20   No growth to date.  --  --      .Urine Catheterized  07-03-20   No growth  --  --      .Other Other  07-03-20   Testing in progress  --  --      .Blood Blood  06-24-20   No Growth Final  --  --      .Urine Clean Catch (Midstream)  06-21-20   <10,000 CFU/ml of other organisms  --  --      .Blood Blood-Peripheral  06-21-20   No Growth Final  --  --      .Urine Catheterized  06-14-20   >100,000 CFU/ml Enterococcus faecium (vancomycin resistant)  --  Enterococcus faecium (vancomycin resistant)      .Urine Clean Catch (Midstream)  06-14-20   >100,000 CFU/ml Enterococcus faecium (vancomycin resistant)  --  Enterococcus faecium (vancomycin resistant)      .Blood Blood-Peripheral  06-12-20   No Growth Final  --  --        CMV IgG Antibody: <0.20 U/mL (12-04-19 @ 08:33)  Toxoplasma IgG Screen: <3.0 IU/mL (12-04-19 @ 08:33)          RADIOLOGY:    <The imaging below has been reviewed and visualized by me independently. Findings as detailed in report below>

## 2020-07-07 NOTE — PROGRESS NOTE ADULT - SUBJECTIVE AND OBJECTIVE BOX
Chief Complaint:  Patient is a 64y old  Male who presents with a chief complaint of Liver failure, hepatic encephalopathy (2020 00:46)      Interval Events: IJ removed. Back on insulin gtt. No other new events. [place] on hold.    Allergies:  codeine (Anaphylaxis)      Hospital Medications:  aspirin enteric coated 81 milliGRAM(s) Oral daily  chlorhexidine 2% Cloths 1 Application(s) Topical <User Schedule>  chlorhexidine 4% Liquid 1 Application(s) Topical <User Schedule>  collagenase Ointment 1 Application(s) Topical daily  HYDROmorphone  Injectable 0.5 milliGRAM(s) IV Push every 3 hours PRN  insulin lispro (HumaLOG) corrective regimen sliding scale   SubCutaneous three times a day before meals  insulin lispro (HumaLOG) corrective regimen sliding scale   SubCutaneous at bedtime  labetalol Injectable 10 milliGRAM(s) IV Push every 1 hour PRN  magnesium oxide 400 milliGRAM(s) Oral two times a day with meals  methylPREDNISolone sodium succinate Injectable 10 milliGRAM(s) IV Push two times a day  methylPREDNISolone sodium succinate Injectable   IV Push   mycophenolate mofetil 1000 milliGRAM(s) Oral <User Schedule>  NIFEdipine XL 30 milliGRAM(s) Oral daily  nystatin    Suspension 356107 Unit(s) Swish and Swallow four times a day  nystatin Cream 1 Application(s) Topical two times a day  oxyCODONE    IR 5 milliGRAM(s) Oral every 4 hours PRN  oxyCODONE    IR 10 milliGRAM(s) Oral every 4 hours PRN  pantoprazole  Injectable 40 milliGRAM(s) IV Push every 12 hours  sodium chloride 0.9% lock flush 10 milliLiter(s) IV Push every 1 hour PRN  tamsulosin 0.4 milliGRAM(s) Oral at bedtime  trimethoprim   80 mG/sulfamethoxazole 400 mG 1 Tablet(s) Oral daily  valGANciclovir 450 milliGRAM(s) Oral daily      PMHX/PSHX:  GIB (gastrointestinal bleeding)  GERD with esophagitis  Hepatic encephalopathy  Obesity  Fatty liver disease, nonalcoholic  Renal stones  Hypertension  Neuropathy  Hypercholesteremia  Diabetes  S/P cholecystectomy  No significant past surgical history      Family history:  Family history of type 2 diabetes mellitus  Family history of hypertension  Family history of stomach cancer  No pertinent family history in first degree relatives      ROS:     General:  No wt loss, fevers, chills, night sweats, fatigue,   Eyes:  Good vision, no reported pain  ENT:  No sore throat, pain, runny nose, dysphagia  CV:  No pain, palpitations, hypo/hypertension  Resp:  No dyspnea, cough, tachypnea, wheezing  GI:  See HPI  :  No pain, bleeding, incontinence, nocturia  Muscle:  No pain, weakness  Neuro:  No weakness, tingling, memory problems  Psych:  No fatigue, insomnia, mood problems, depression  Endocrine:  No polyuria, polydipsia, cold/heat intolerance  Heme:  No petechiae, ecchymosis, easy bruisability  Skin:  No rash, edema      PHYSICAL EXAM:     Vital Signs:  Vital Signs Last 24 Hrs  T(C): 36.5 (2020 07:00), Max: 36.8 (2020 19:00)  T(F): 97.7 (2020 07:00), Max: 98.2 (2020 19:00)  HR: 81 (2020 10:18) (77 - 138)  BP: 104/53 (2020 10:18) (104/53 - 152/67)  BP(mean): 87 (2020 09:00) (80 - 127)  RR: 23 (2020 09:00) (15 - 37)  SpO2: 99% (2020 10:18) (83% - 100%)  Daily     Daily Weight in k.2 (2020 01:00)    GENERAL: no acute distress  NEURO: alert, no asterixis, +tremor  HEENT: anicteric sclera, no conjunctival pallor appreciated  CHEST: no respiratory distress, no accessory muscle use  CARDIAC: regular rate, rhythm  ABDOMEN: soft, incisions intact, ANDREINA drains in place w/ serosanguinous drainage, T-tube w/o drainage  EXTREMITIES: warm, well perfused, 1+edema  SKIN: R heel with small open wound    LABS:                        10.1   4.71  )-----------( 81       ( 2020 01:12 )             29.7     07-07    131<L>  |  97  |  38<H>  ----------------------------<  285<H>  4.4   |  26  |  1.05    Ca    9.0      2020 01:12  Phos  2.0     07-07  Mg     2.0     07-07    TPro  5.0<L>  /  Alb  3.1<L>  /  TBili  1.6<H>  /  DBili  0.7<H>  /  AST  46<H>  /  ALT  139<H>  /  AlkPhos  117  07-07    LIVER FUNCTIONS - ( 2020 01:12 )  Alb: 3.1 g/dL / Pro: 5.0 g/dL / ALK PHOS: 117 U/L / ALT: 139 U/L / AST: 46 U/L / GGT: x           PT/INR - ( 2020 01:12 )   PT: 13.1 sec;   INR: 1.15 ratio         PTT - ( 2020 01:12 )  PTT:25.8 sec        Imaging: Chief Complaint:  Patient is a 64y old  Male who presents with a chief complaint of Liver failure, hepatic encephalopathy (2020 00:46)      Interval Events: IJ removed. Back on insulin gtt. Pt with continued tremors and c/f seeing butterflies per wife. [place] on hold.    Allergies:  codeine (Anaphylaxis)      Hospital Medications:  aspirin enteric coated 81 milliGRAM(s) Oral daily  chlorhexidine 2% Cloths 1 Application(s) Topical <User Schedule>  chlorhexidine 4% Liquid 1 Application(s) Topical <User Schedule>  collagenase Ointment 1 Application(s) Topical daily  HYDROmorphone  Injectable 0.5 milliGRAM(s) IV Push every 3 hours PRN  insulin lispro (HumaLOG) corrective regimen sliding scale   SubCutaneous three times a day before meals  insulin lispro (HumaLOG) corrective regimen sliding scale   SubCutaneous at bedtime  labetalol Injectable 10 milliGRAM(s) IV Push every 1 hour PRN  magnesium oxide 400 milliGRAM(s) Oral two times a day with meals  methylPREDNISolone sodium succinate Injectable 10 milliGRAM(s) IV Push two times a day  methylPREDNISolone sodium succinate Injectable   IV Push   mycophenolate mofetil 1000 milliGRAM(s) Oral <User Schedule>  NIFEdipine XL 30 milliGRAM(s) Oral daily  nystatin    Suspension [id number] Unit(s) Swish and Swallow four times a day  nystatin Cream 1 Application(s) Topical two times a day  oxyCODONE    IR 5 milliGRAM(s) Oral every 4 hours PRN  oxyCODONE    IR 10 milliGRAM(s) Oral every 4 hours PRN  pantoprazole  Injectable 40 milliGRAM(s) IV Push every 12 hours  sodium chloride 0.9% lock flush 10 milliLiter(s) IV Push every 1 hour PRN  tamsulosin 0.4 milliGRAM(s) Oral at bedtime  trimethoprim   80 mG/sulfamethoxazole 400 mG 1 Tablet(s) Oral daily  valGANciclovir 450 milliGRAM(s) Oral daily      PMHX/PSHX:  GIB (gastrointestinal bleeding)  GERD with esophagitis  Hepatic encephalopathy  Obesity  Fatty liver disease, nonalcoholic  Renal stones  Hypertension  Neuropathy  Hypercholesteremia  Diabetes  S/P cholecystectomy  No significant past surgical history      Family history:  Family history of type 2 diabetes mellitus  Family history of hypertension  Family history of stomach cancer  No pertinent family history in first degree relatives      ROS:     General:  No wt loss, fevers, chills, night sweats, fatigue,   Eyes:  Good vision, no reported pain  ENT:  No sore throat, pain, runny nose, dysphagia  CV:  No pain, palpitations, hypo/hypertension  Resp:  No dyspnea, cough, tachypnea, wheezing  GI:  See HPI  :  No pain, bleeding, incontinence, nocturia  Muscle:  No pain, weakness  Neuro:  No weakness, tingling, memory problems  Psych:  No fatigue, insomnia, mood problems, depression  Endocrine:  No polyuria, polydipsia, cold/heat intolerance  Heme:  No petechiae, ecchymosis, easy bruisability  Skin:  No rash, edema      PHYSICAL EXAM:     Vital Signs:  Vital Signs Last 24 Hrs  T(C): 36.5 (2020 07:00), Max: 36.8 (2020 19:00)  T(F): 97.7 (2020 07:00), Max: 98.2 (2020 19:00)  HR: 81 (2020 10:18) (77 - 138)  BP: 104/53 (2020 10:18) (104/53 - 152/67)  BP(mean): 87 (2020 09:00) (80 - 127)  RR: 23 (2020 09:00) (15 - 37)  SpO2: 99% (2020 10:18) (83% - 100%)  Daily     Daily Weight in k.2 (2020 01:00)    GENERAL: no acute distress  NEURO: alert, AAOx2 (doesnt know date), answers questions slowly, no asterixis, +tremor  HEENT: anicteric sclera, no conjunctival pallor appreciated  CHEST: no respiratory distress, no accessory muscle use  CARDIAC: regular rate, rhythm  ABDOMEN: soft, incisions intact, ANDREINA drains in place w/ serosanguinous drainage, T-tube w/o drainage  EXTREMITIES: warm, well perfused, 1+edema  SKIN: R heel with small open wound    LABS:                        10.1   4.71  )-----------( 81       ( 2020 01:12 )             29.7     07-07    131<L>  |  97  |  38<H>  ----------------------------<  285<H>  4.4   |  26  |  1.05    Ca    9.0      2020 01:12  Phos  2.0     07-07  Mg     2.0     07-07    TPro  5.0<L>  /  Alb  3.1<L>  /  TBili  1.6<H>  /  DBili  0.7<H>  /  AST  46<H>  /  ALT  139<H>  /  AlkPhos  117  07-07    LIVER FUNCTIONS - ( 2020 01:12 )  Alb: 3.1 g/dL / Pro: 5.0 g/dL / ALK PHOS: 117 U/L / ALT: 139 U/L / AST: 46 U/L / GGT: x           PT/INR - ( 2020 01:12 )   PT: 13.1 sec;   INR: 1.15 ratio         PTT - ( 2020 01:12 )  PTT:25.8 sec        Imaging: Chief Complaint:  Patient is a 64y old  Male who presents with a chief complaint of Liver failure, hepatic encephalopathy (2020 00:46)      Interval Events: IJ removed. ANDREINA #2 removed. Back on insulin gtt. Pt with continued tremors and c/f seeing "butterflies" per wife. Also reported seeing "crystals" around his room.    Allergies:  codeine (Anaphylaxis)    Hospital Medications:  aspirin enteric coated 81 milliGRAM(s) Oral daily  chlorhexidine 2% Cloths 1 Application(s) Topical <User Schedule>  chlorhexidine 4% Liquid 1 Application(s) Topical <User Schedule>  collagenase Ointment 1 Application(s) Topical daily  HYDROmorphone  Injectable 0.5 milliGRAM(s) IV Push every 3 hours PRN  insulin lispro (HumaLOG) corrective regimen sliding scale   SubCutaneous three times a day before meals  insulin lispro (HumaLOG) corrective regimen sliding scale   SubCutaneous at bedtime  labetalol Injectable 10 milliGRAM(s) IV Push every 1 hour PRN  magnesium oxide 400 milliGRAM(s) Oral two times a day with meals  methylPREDNISolone sodium succinate Injectable 10 milliGRAM(s) IV Push two times a day  methylPREDNISolone sodium succinate Injectable   IV Push   mycophenolate mofetil 1000 milliGRAM(s) Oral <User Schedule>  NIFEdipine XL 30 milliGRAM(s) Oral daily  nystatin    Suspension [id number] Unit(s) Swish and Swallow four times a day  nystatin Cream 1 Application(s) Topical two times a day  oxyCODONE    IR 5 milliGRAM(s) Oral every 4 hours PRN  oxyCODONE    IR 10 milliGRAM(s) Oral every 4 hours PRN  pantoprazole  Injectable 40 milliGRAM(s) IV Push every 12 hours  sodium chloride 0.9% lock flush 10 milliLiter(s) IV Push every 1 hour PRN  tamsulosin 0.4 milliGRAM(s) Oral at bedtime  trimethoprim   80 mG/sulfamethoxazole 400 mG 1 Tablet(s) Oral daily  valGANciclovir 450 milliGRAM(s) Oral daily      PMHX/PSHX:  GIB (gastrointestinal bleeding)  GERD with esophagitis  Hepatic encephalopathy  Obesity  Fatty liver disease, nonalcoholic  Renal stones  Hypertension  Neuropathy  Hypercholesteremia  Diabetes  S/P cholecystectomy  No significant past surgical history      Family history:  Family history of type 2 diabetes mellitus  Family history of hypertension  Family history of stomach cancer  No pertinent family history in first degree relatives      ROS:     General:  No wt loss, fevers, chills, night sweats, fatigue,   Eyes:  Good vision, no reported pain  ENT:  No sore throat, pain, runny nose, dysphagia  CV:  No pain, palpitations, hypo/hypertension  Resp:  No dyspnea, cough, tachypnea, wheezing  GI:  See HPI  :  No pain, bleeding, incontinence, nocturia  Muscle:  No pain, weakness  Neuro:  No weakness, tingling, memory problems  Psych:  As per HPI. No fatigue, insomnia, mood problems, depression  Endocrine:  No polyuria, polydipsia, cold/heat intolerance  Heme:  No petechiae, ecchymosis, easy bruisability  Skin:  No rash, edema      PHYSICAL EXAM:     Vital Signs:  Vital Signs Last 24 Hrs  T(C): 36.5 (2020 07:00), Max: 36.8 (2020 19:00)  T(F): 97.7 (2020 07:00), Max: 98.2 (2020 19:00)  HR: 81 (2020 10:18) (77 - 138)  BP: 104/53 (2020 10:18) (104/53 - 152/67)  BP(mean): 87 (2020 09:00) (80 - 127)  RR: 23 (2020 09:00) (15 - 37)  SpO2: 99% (2020 10:18) (83% - 100%)  Daily     Daily Weight in k.2 (2020 01:00)    GENERAL: no acute distress  NEURO: alert, AAOx2 (doesn't know date), answers questions slowly, no asterixis, +tremor  HEENT: anicteric sclera, no conjunctival pallor appreciated  CHEST: no respiratory distress, no accessory muscle use  CARDIAC: regular rate, rhythm  ABDOMEN: soft, incisions intact, ANDREINA drains in place w/ serosanguinous drainage, T-tube w/o drainage  EXTREMITIES: warm, well perfused, 1+edema  SKIN: R heel with small open wound    LABS:                        10.1   4.71  )-----------( 81       ( 2020 01:12 )             29.7     07-07    131<L>  |  97  |  38<H>  ----------------------------<  285<H>  4.4   |  26  |  1.05    Ca    9.0      2020 01:12  Phos  2.0     07-  Mg     2.0     07-    TPro  5.0<L>  /  Alb  3.1<L>  /  TBili  1.6<H>  /  DBili  0.7<H>  /  AST  46<H>  /  ALT  139<H>  /  AlkPhos  117  07-07    LIVER FUNCTIONS - ( 2020 01:12 )  Alb: 3.1 g/dL / Pro: 5.0 g/dL / ALK PHOS: 117 U/L / ALT: 139 U/L / AST: 46 U/L / GGT: x           PT/INR - ( 2020 01:12 )   PT: 13.1 sec;   INR: 1.15 ratio         PTT - ( 2020 01:12 )  PTT:25.8 sec        Imaging:

## 2020-07-07 NOTE — PROGRESS NOTE ADULT - ASSESSMENT
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    [] s/p OLTx  - Good graft function, LFTs trending down, t bili down 1.7  - Drains: Remove ANDREINA #2 today. Monitor drain output JPs, T tube to gravity  - Diet: Regular diet  - Pain control PRN  - DVT PPx: SCDs at all times.  - ID: bld cxs from 7/3 NGTD, continue meropenem for now  - Continue Protonix 40mg IV bid    - ASA on hold in setting of recent UGI bleed  - Immunsuppression: Give Prograf 3mg now and continue daily BID.  MMF 1/1, pred taper.  Simulect induction.  Daily Tacrolimus level. Keep mag >2.  Monitor for neurotoxicity, tremors.   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    [] s/p OLTx  - Good graft function, LFTs trending down, t bili down 1.6  - Drains: Monitor ANDREINA and T tube output  - Diet: Regular diet  - Pain control PRN  - DVT PPx: SCDs at all times  - ID: 7/3 BCx, UCx ngtd, OR Cx ngtd.  DC meropenem  - Continue Protonix 40mg IV bid    - Continue ASA 81mg daily  - Immunsuppression: Decrease Prograf to 2mg BID.  MMF 1/1, pred taper.  Simulect completed.  Daily Tacrolimus level. Keep mag >2.  Monitor for signs of neurotoxicity, tremors.   - PPX: valcyte/bactrim/nystatin  - Start MgSo4 400mg BID  - Cont ICU care

## 2020-07-07 NOTE — CONSULT NOTE ADULT - SUBJECTIVE AND OBJECTIVE BOX
HPI:  65 yo M PMHx of decompensated JEAN cirrhosis, HTn, HLD, DM, HFpEF (EF 70%) presents with reports of confusion. Saw his hepatologist yesterday was noted to be more jaundiced and more confused than normal. Per wife his was not oriented to time and place, and forgot how many grandchildren he had. He also urinated in his bedroom. He was confused for roughly 12 hours. He is now back to his baseline mental status. He denies fever, chills, dysuria, increased urinary frequency, flank pain, neck stiffness. No melena, hematochezia, hematemesis. (11 Jun 2020 13:53)      PAST MEDICAL & SURGICAL HISTORY:  GIB (gastrointestinal bleeding)  GERD with esophagitis: Gastritis &amp; Non Bleeding Ulcers  Hepatic encephalopathy  Obesity  Fatty liver disease, nonalcoholic  Renal stones: 25 years ago  Hypertension  Neuropathy  Hypercholesteremia  Diabetes  S/P cholecystectomy      FAMILY HISTORY:  Family history of type 2 diabetes mellitus  Family history of hypertension  Family history of stomach cancer      Social History:  Tobacco  ETOH  Illicits  Occupation    Outpatient Medications:    MEDICATIONS  (STANDING):  aspirin enteric coated 81 milliGRAM(s) Oral daily  chlorhexidine 2% Cloths 1 Application(s) Topical <User Schedule>  chlorhexidine 4% Liquid 1 Application(s) Topical <User Schedule>  collagenase Ointment 1 Application(s) Topical daily  insulin lispro (HumaLOG) corrective regimen sliding scale   SubCutaneous three times a day before meals  insulin lispro (HumaLOG) corrective regimen sliding scale   SubCutaneous at bedtime  magnesium oxide 400 milliGRAM(s) Oral two times a day with meals  methylPREDNISolone sodium succinate Injectable 10 milliGRAM(s) IV Push two times a day  methylPREDNISolone sodium succinate Injectable   IV Push   mycophenolate mofetil 1000 milliGRAM(s) Oral <User Schedule>  NIFEdipine XL 30 milliGRAM(s) Oral daily  nystatin    Suspension 112915 Unit(s) Swish and Swallow four times a day  nystatin Cream 1 Application(s) Topical two times a day  pantoprazole  Injectable 40 milliGRAM(s) IV Push every 12 hours  tacrolimus 3 milliGRAM(s) Oral <User Schedule>  tamsulosin 0.4 milliGRAM(s) Oral at bedtime  trimethoprim   80 mG/sulfamethoxazole 400 mG 1 Tablet(s) Oral daily  valGANciclovir 450 milliGRAM(s) Oral daily    MEDICATIONS  (PRN):  labetalol Injectable 10 milliGRAM(s) IV Push every 1 hour PRN systolic blood pressure GREATER THAN 170 mmHg or diastolic blood pressure GREATER THAN 100 mmHg  oxyCODONE    IR 5 milliGRAM(s) Oral every 4 hours PRN Moderate Pain (4 - 6)  oxyCODONE    IR 10 milliGRAM(s) Oral every 4 hours PRN Severe Pain (7 - 10)  sodium chloride 0.9% lock flush 10 milliLiter(s) IV Push every 1 hour PRN Pre/post blood products, medications, blood draw, and to maintain line patency      Allergies    codeine (Anaphylaxis)    Intolerances      Review of Systems:  Constitutional: No fever, good appetite/po intake  Eyes: No blurry vision, diplopia  Neuro: No tremors  HEENT: No pain  Cardiovascular: No chest pain, palpitations  Respiratory: No SOB, no cough  GI: No nausea, vomiting,   : No dysuria, hematuria  Skin: no rash  Psych: no depression  Endocrine: no polyuria, polydipsia  Hem/lymph: no swelling  Osteoporosis: no fractures    ALL OTHER SYSTEMS REVIEWED AND NEGATIVE    UNABLE TO OBTAIN    PHYSICAL EXAM:  VITALS: T(C): 36.7 (07-07-20 @ 15:00)  T(F): 98.1 (07-07-20 @ 15:00), Max: 98.2 (07-06-20 @ 19:00)  HR: 80 (07-07-20 @ 15:00) (77 - 163)  BP: 126/72 (07-07-20 @ 15:00) (104/53 - 152/67)  RR:  (15 - 25)  SpO2:  (83% - 99%)  Wt(kg): --  GENERAL: NAD, well-groomed, well-developed  EYES: No proptosis, no lid lag, anicteric  HEENT:  Atraumatic, Normocephalic, moist mucous membranes  THYROID: Normal size, no palpable nodules  RESPIRATORY: Clear to auscultation bilaterally; No rales, rhonchi, wheezing, or rubs  CARDIOVASCULAR: Regular rate and rhythm; No murmurs; no peripheral edema  GI: Soft, nontender, non distended, normal bowel sounds  SKIN: Dry, intact, No rashes or lesions  NEURO: sensation intact, extraocular movements intact, no tremor, normal reflexes  PSYCH: reactive affect, euthymic mood  CUSHING'S SIGNS: no striae    POCT Blood Glucose.: 235 mg/dL (07-07-20 @ 16:24)  POCT Blood Glucose.: 205 mg/dL (07-07-20 @ 11:21)  POCT Blood Glucose.: 104 mg/dL (07-07-20 @ 08:20)  POCT Blood Glucose.: 104 mg/dL (07-07-20 @ 06:49)  POCT Blood Glucose.: 124 mg/dL (07-07-20 @ 06:11)  POCT Blood Glucose.: 140 mg/dL (07-07-20 @ 05:02)  POCT Blood Glucose.: 150 mg/dL (07-07-20 @ 03:59)  POCT Blood Glucose.: 176 mg/dL (07-07-20 @ 02:55)  POCT Blood Glucose.: 217 mg/dL (07-07-20 @ 01:54)  POCT Blood Glucose.: 274 mg/dL (07-07-20 @ 00:50)  POCT Blood Glucose.: 331 mg/dL (07-06-20 @ 23:54)  POCT Blood Glucose.: 317 mg/dL (07-06-20 @ 23:08)  POCT Blood Glucose.: 320 mg/dL (07-06-20 @ 21:25)  POCT Blood Glucose.: 232 mg/dL (07-06-20 @ 16:41)  POCT Blood Glucose.: 213 mg/dL (07-06-20 @ 10:40)  POCT Blood Glucose.: 120 mg/dL (07-06-20 @ 06:50)  POCT Blood Glucose.: 121 mg/dL (07-06-20 @ 06:08)  POCT Blood Glucose.: 154 mg/dL (07-06-20 @ 05:02)  POCT Blood Glucose.: 184 mg/dL (07-06-20 @ 04:04)  POCT Blood Glucose.: 197 mg/dL (07-06-20 @ 03:06)  POCT Blood Glucose.: 211 mg/dL (07-06-20 @ 01:57)  POCT Blood Glucose.: 238 mg/dL (07-06-20 @ 00:56)  POCT Blood Glucose.: 265 mg/dL (07-05-20 @ 23:53)  POCT Blood Glucose.: 269 mg/dL (07-05-20 @ 22:57)  POCT Blood Glucose.: 281 mg/dL (07-05-20 @ 20:52)  POCT Blood Glucose.: 284 mg/dL (07-05-20 @ 17:35)  POCT Blood Glucose.: 137 mg/dL (07-05-20 @ 13:05)  POCT Blood Glucose.: 170 mg/dL (07-05-20 @ 11:54)  POCT Blood Glucose.: 167 mg/dL (07-05-20 @ 11:14)  POCT Blood Glucose.: 159 mg/dL (07-05-20 @ 10:00)  POCT Blood Glucose.: 166 mg/dL (07-05-20 @ 09:15)  POCT Blood Glucose.: 142 mg/dL (07-05-20 @ 07:49)  POCT Blood Glucose.: 159 mg/dL (07-05-20 @ 06:53)  POCT Blood Glucose.: 144 mg/dL (07-05-20 @ 05:56)  POCT Blood Glucose.: 124 mg/dL (07-05-20 @ 05:15)  POCT Blood Glucose.: 119 mg/dL (07-05-20 @ 04:06)  POCT Blood Glucose.: 137 mg/dL (07-05-20 @ 02:56)  POCT Blood Glucose.: 150 mg/dL (07-05-20 @ 01:49)  POCT Blood Glucose.: 164 mg/dL (07-05-20 @ 00:53)  POCT Blood Glucose.: 154 mg/dL (07-05-20 @ 00:06)  POCT Blood Glucose.: 168 mg/dL (07-04-20 @ 22:57)  POCT Blood Glucose.: 166 mg/dL (07-04-20 @ 22:01)  POCT Blood Glucose.: 176 mg/dL (07-04-20 @ 21:04)  POCT Blood Glucose.: 199 mg/dL (07-04-20 @ 19:37)  POCT Blood Glucose.: 222 mg/dL (07-04-20 @ 17:27)                            10.7   5.43  )-----------( 78       ( 07 Jul 2020 15:01 )             32.2       07-07    131<L>  |  95<L>  |  35<H>  ----------------------------<  171<H>  4.1   |  22  |  1.07    EGFR if : 85  EGFR if non : 73    Ca    8.8      07-07  Mg     1.8     07-07  Phos  2.7     07-07    TPro  5.1<L>  /  Alb  3.3  /  TBili  1.9<H>  /  DBili  0.9<H>  /  AST  63<H>  /  ALT  148<H>  /  AlkPhos  113  07-07 HPI: 63 yo M with hx of T2DM uncontrolled due to steroids x 10 years with neuropathy and R DFU of the heel, HTN, HLD, MGUS, and decompensated JEAN cirrhosis s/p liver x-plant on 2020 admitted 2020 with confusion secondary to liver failure and encephalopathy The patient is known to use as we have seen him several times here at [facility] and most recently at [facility] for uncontrolled diabetes, Post-transplant the patient is on high-dose steroids for 30 days. He was transitioned off the insulin gtt this morning and only given moderate correctional scale despite eating and being on MP 10mg IVP BID. On the gtt his rate for the last 6 hours was:  7am 1.5 units/hour  6am 2 units/hour  5am 2.5 units/hour  4am 2.5 units/hour  3am 2.5 units/hour  2am 2.5 units/hour    For his diabetes he f/u with his PCP. He used to see Dr. Jean Carlos Mathur, endocrinologist.     PAST MEDICAL & SURGICAL HISTORY:  GIB (gastrointestinal bleeding)  GERD with esophagitis: Gastritis &amp; Non Bleeding Ulcers  Hepatic encephalopathy  Obesity  Fatty liver disease, nonalcoholic  Renal stones: 25 years ago  Hypertension  Neuropathy  Hypercholesteremia  Diabetes  S/P cholecystectomy      FAMILY HISTORY:  Diabetes: father, who  50 years ago      Social History:  Tobacco: denies  ETOH: denies  Occupation: used to work in construction     Outpatient Medications:    MEDICATIONS  (STANDING):  aspirin enteric coated 81 milliGRAM(s) Oral daily  chlorhexidine 2% Cloths 1 Application(s) Topical <User Schedule>  chlorhexidine 4% Liquid 1 Application(s) Topical <User Schedule>  collagenase Ointment 1 Application(s) Topical daily  insulin lispro (HumaLOG) corrective regimen sliding scale   SubCutaneous three times a day before meals  insulin lispro (HumaLOG) corrective regimen sliding scale   SubCutaneous at bedtime  magnesium oxide 400 milliGRAM(s) Oral two times a day with meals  methylPREDNISolone sodium succinate Injectable 10 milliGRAM(s) IV Push two times a day  methylPREDNISolone sodium succinate Injectable   IV Push   mycophenolate mofetil 1000 milliGRAM(s) Oral <User Schedule>  NIFEdipine XL 30 milliGRAM(s) Oral daily  nystatin    Suspension 752871 Unit(s) Swish and Swallow four times a day  nystatin Cream 1 Application(s) Topical two times a day  pantoprazole  Injectable 40 milliGRAM(s) IV Push every 12 hours  tacrolimus 3 milliGRAM(s) Oral <User Schedule>  tamsulosin 0.4 milliGRAM(s) Oral at bedtime  trimethoprim   80 mG/sulfamethoxazole 400 mG 1 Tablet(s) Oral daily  valGANciclovir 450 milliGRAM(s) Oral daily    MEDICATIONS  (PRN):  labetalol Injectable 10 milliGRAM(s) IV Push every 1 hour PRN systolic blood pressure GREATER THAN 170 mmHg or diastolic blood pressure GREATER THAN 100 mmHg  oxyCODONE    IR 5 milliGRAM(s) Oral every 4 hours PRN Moderate Pain (4 - 6)  oxyCODONE    IR 10 milliGRAM(s) Oral every 4 hours PRN Severe Pain (7 - 10)  sodium chloride 0.9% lock flush 10 milliLiter(s) IV Push every 1 hour PRN Pre/post blood products, medications, blood draw, and to maintain line patency      Allergies  codeine (Anaphylaxis)      Review of Systems:  Constitutional: No fever/chills   Neuro: no neuropathy, HA  Cardiovascular: No chest pain, palpitations  Respiratory: No SOB, no cough  GI: No nausea, vomiting,   : No dysuria, hematuria  Skin: no pruritus +jaundice (before sx)  Endocrine: no polyuria, polydipsia  ALL OTHER SYSTEMS REVIEWED AND NEGATIVE    PHYSICAL EXAM:  VITALS: T(C): 36.7 (20 @ 15:00)  T(F): 98.1 (20 @ 15:00), Max: 98.2 (20 @ 19:00)  HR: 80 (20 @ 15:00) (77 - 163)  BP: 126/72 (20 @ 15:00) (104/53 - 152/67)  RR:  (15 - 25)  SpO2:  (83% - 99%)  Wt(kg): --  GENERAL: NAD, well-groomed, well-developed  EYES: No proptosis, anicteric  HEENT:  Atraumatic, Normocephalic, moist mucous membranes  THYROID: Normal size, no palpable nodules  RESPIRATORY: Clear to auscultation bilaterally; No rales, rhonchi, wheezing, or rubs  CARDIOVASCULAR: Regular rate and rhythm; No murmurs  GI: Soft, nontender, non distended, normal bowel sounds  SKIN: Dry, intact, No rashes or lesions on L foot, R foot with bandage on heel - rest of foot looks good  NEURO: sensation intact, extraocular movements intact, no tremor, normal reflexes  PSYCH: reactive affect, euthymic mood      POCT Blood Glucose.: 235 mg/dL (20 @ 16:24)  POCT Blood Glucose.: 205 mg/dL (20 @ 11:21)  POCT Blood Glucose.: 104 mg/dL (20 @ 08:20)  POCT Blood Glucose.: 104 mg/dL (20 @ 06:49)  POCT Blood Glucose.: 124 mg/dL (20 @ 06:11)  POCT Blood Glucose.: 140 mg/dL (20 @ 05:02)  POCT Blood Glucose.: 150 mg/dL (20 @ 03:59)  POCT Blood Glucose.: 176 mg/dL (20 @ 02:55)  POCT Blood Glucose.: 217 mg/dL (20 @ 01:54)  POCT Blood Glucose.: 274 mg/dL (20 @ 00:50)  POCT Blood Glucose.: 331 mg/dL (20 @ 23:54)  POCT Blood Glucose.: 317 mg/dL (20 @ 23:08)  POCT Blood Glucose.: 320 mg/dL (20 @ 21:25)  POCT Blood Glucose.: 232 mg/dL (20 @ 16:41)  POCT Blood Glucose.: 213 mg/dL (20 @ 10:40)  POCT Blood Glucose.: 120 mg/dL (20 @ 06:50)  POCT Blood Glucose.: 121 mg/dL (20 @ 06:08)  POCT Blood Glucose.: 154 mg/dL (20 @ 05:02)  POCT Blood Glucose.: 184 mg/dL (20 @ 04:04)  POCT Blood Glucose.: 197 mg/dL (20 @ 03:06)  POCT Blood Glucose.: 211 mg/dL (20 @ 01:57)  POCT Blood Glucose.: 238 mg/dL (20 @ 00:56)  POCT Blood Glucose.: 265 mg/dL (20 @ 23:53)  POCT Blood Glucose.: 269 mg/dL (20 @ 22:57)  POCT Blood Glucose.: 281 mg/dL (20 @ 20:52)  POCT Blood Glucose.: 284 mg/dL (20 @ 17:35)  POCT Blood Glucose.: 137 mg/dL (20 @ 13:05)  POCT Blood Glucose.: 170 mg/dL (20 @ 11:54)  POCT Blood Glucose.: 167 mg/dL (20 @ 11:14)  POCT Blood Glucose.: 159 mg/dL (20 @ 10:00)  POCT Blood Glucose.: 166 mg/dL (20 @ 09:15)  POCT Blood Glucose.: 142 mg/dL (20 @ 07:49)  POCT Blood Glucose.: 159 mg/dL (20 @ 06:53)  POCT Blood Glucose.: 144 mg/dL (20 @ 05:56)  POCT Blood Glucose.: 124 mg/dL (20 @ 05:15)  POCT Blood Glucose.: 119 mg/dL (20 @ 04:06)  POCT Blood Glucose.: 137 mg/dL (20 @ 02:56)  POCT Blood Glucose.: 150 mg/dL (20 @ 01:49)  POCT Blood Glucose.: 164 mg/dL (20 @ 00:53)  POCT Blood Glucose.: 154 mg/dL (20 @ 00:06)  POCT Blood Glucose.: 168 mg/dL (20 @ 22:57)  POCT Blood Glucose.: 166 mg/dL (20 @ 22:01)  POCT Blood Glucose.: 176 mg/dL (20 @ 21:04)  POCT Blood Glucose.: 199 mg/dL (20 @ 19:37)  POCT Blood Glucose.: 222 mg/dL (20 @ 17:27)                            10.7   5.43  )-----------( 78        15:01 )             32.2       -    131<L>  |  95<L>  |  35<H>  ----------------------------<  171<H>  4.1   |  22  |  1.07    EGFR if : 85  EGFR if non : 73    Ca    8.8        Mg     1.8       Phos  2.7         TPro  5.1<L>  /  Alb  3.3  /  TBili  1.9<H>  /  DBili  0.9<H>  /  AST  63<H>  /  ALT  148<H>  /  AlkPhos  113

## 2020-07-07 NOTE — PROGRESS NOTE ADULT - PROBLEM SELECTOR PLAN 1
s/p Liver transplant  anti-rejection and post op prophylaxis per transplant hepatology  diet as tolerated  cont ICU care  greatly appreciate transplant service care and efforts

## 2020-07-07 NOTE — PROGRESS NOTE ADULT - ATTENDING COMMENTS
agree with above  good graft function  tremors and visual hallucinations; CNI toxicity or steroids effect; will monitor  immunosuppression tacro, mmf and steroids

## 2020-07-07 NOTE — CONSULT NOTE ADULT - ASSESSMENT
65 yo M with hx of T2DM uncontrolled due to steroids x 10 years with neuropathy and R DFU of the heel, HTN, HLD, MGUS, and decompensated JEAN cirrhosis s/p liver x-plant on 7/2/2020 admitted 6/11/2020 with confusion secondary to liver failure and encephalopathy. Endocrine consulted for glycemic control.

## 2020-07-08 LAB
ALBUMIN SERPL ELPH-MCNC: 2.9 G/DL — LOW (ref 3.3–5)
ALP SERPL-CCNC: 117 U/L — SIGNIFICANT CHANGE UP (ref 40–120)
ALT FLD-CCNC: 123 U/L — HIGH (ref 10–45)
ANION GAP SERPL CALC-SCNC: 9 MMOL/L — SIGNIFICANT CHANGE UP (ref 5–17)
APTT BLD: 26.6 SEC — LOW (ref 27.5–35.5)
AST SERPL-CCNC: 41 U/L — HIGH (ref 10–40)
BILIRUB DIRECT SERPL-MCNC: 0.8 MG/DL — HIGH (ref 0–0.2)
BILIRUB INDIRECT FLD-MCNC: 0.9 MG/DL — SIGNIFICANT CHANGE UP (ref 0.2–1)
BILIRUB SERPL-MCNC: 1.7 MG/DL — HIGH (ref 0.2–1.2)
BUN SERPL-MCNC: 35 MG/DL — HIGH (ref 7–23)
CALCIUM SERPL-MCNC: 8.5 MG/DL — SIGNIFICANT CHANGE UP (ref 8.4–10.5)
CHLORIDE SERPL-SCNC: 97 MMOL/L — SIGNIFICANT CHANGE UP (ref 96–108)
CO2 SERPL-SCNC: 25 MMOL/L — SIGNIFICANT CHANGE UP (ref 22–31)
CREAT SERPL-MCNC: 1.11 MG/DL — SIGNIFICANT CHANGE UP (ref 0.5–1.3)
CULTURE RESULTS: SIGNIFICANT CHANGE UP
CULTURE RESULTS: SIGNIFICANT CHANGE UP
FIBRINOGEN AG PPP IA-MCNC: 136 MG/DL — SIGNIFICANT CHANGE UP
FIBRINOGEN AG PPP IA-MCNC: 154 MG/DL — SIGNIFICANT CHANGE UP
FIBRINOGEN AG PPP IA-MCNC: 196 MG/DL — SIGNIFICANT CHANGE UP
FIBRINOGEN PPP-MCNC: 220 MG/DL — LOW (ref 350–510)
GLUCOSE BLDC GLUCOMTR-MCNC: 113 MG/DL — HIGH (ref 70–99)
GLUCOSE BLDC GLUCOMTR-MCNC: 132 MG/DL — HIGH (ref 70–99)
GLUCOSE BLDC GLUCOMTR-MCNC: 177 MG/DL — HIGH (ref 70–99)
GLUCOSE BLDC GLUCOMTR-MCNC: 188 MG/DL — HIGH (ref 70–99)
GLUCOSE BLDC GLUCOMTR-MCNC: 64 MG/DL — LOW (ref 70–99)
GLUCOSE BLDC GLUCOMTR-MCNC: 64 MG/DL — LOW (ref 70–99)
GLUCOSE BLDC GLUCOMTR-MCNC: 88 MG/DL — SIGNIFICANT CHANGE UP (ref 70–99)
GLUCOSE SERPL-MCNC: 256 MG/DL — HIGH (ref 70–99)
HCT VFR BLD CALC: 31.3 % — LOW (ref 39–50)
HGB BLD-MCNC: 10.4 G/DL — LOW (ref 13–17)
INR BLD: 1.12 RATIO — SIGNIFICANT CHANGE UP (ref 0.88–1.16)
MAGNESIUM SERPL-MCNC: 2 MG/DL — SIGNIFICANT CHANGE UP (ref 1.6–2.6)
MCHC RBC-ENTMCNC: 31.1 PG — SIGNIFICANT CHANGE UP (ref 27–34)
MCHC RBC-ENTMCNC: 33.2 GM/DL — SIGNIFICANT CHANGE UP (ref 32–36)
MCV RBC AUTO: 93.7 FL — SIGNIFICANT CHANGE UP (ref 80–100)
NRBC # BLD: 0 /100 WBCS — SIGNIFICANT CHANGE UP (ref 0–0)
PHOSPHATE SERPL-MCNC: 3 MG/DL — SIGNIFICANT CHANGE UP (ref 2.5–4.5)
PLATELET # BLD AUTO: 68 K/UL — LOW (ref 150–400)
POTASSIUM SERPL-MCNC: 4.6 MMOL/L — SIGNIFICANT CHANGE UP (ref 3.5–5.3)
POTASSIUM SERPL-SCNC: 4.6 MMOL/L — SIGNIFICANT CHANGE UP (ref 3.5–5.3)
PROT SERPL-MCNC: 4.7 G/DL — LOW (ref 6–8.3)
PROTHROM AB SERPL-ACNC: 12.7 SEC — SIGNIFICANT CHANGE UP (ref 10.6–13.6)
RBC # BLD: 3.34 M/UL — LOW (ref 4.2–5.8)
RBC # FLD: 16.9 % — HIGH (ref 10.3–14.5)
SODIUM SERPL-SCNC: 131 MMOL/L — LOW (ref 135–145)
SPECIMEN SOURCE: SIGNIFICANT CHANGE UP
SPECIMEN SOURCE: SIGNIFICANT CHANGE UP
TACROLIMUS SERPL-MCNC: 10.2 NG/ML — SIGNIFICANT CHANGE UP
WBC # BLD: 4.7 K/UL — SIGNIFICANT CHANGE UP (ref 3.8–10.5)
WBC # FLD AUTO: 4.7 K/UL — SIGNIFICANT CHANGE UP (ref 3.8–10.5)

## 2020-07-08 PROCEDURE — 99232 SBSQ HOSP IP/OBS MODERATE 35: CPT

## 2020-07-08 PROCEDURE — 93010 ELECTROCARDIOGRAM REPORT: CPT

## 2020-07-08 PROCEDURE — 99233 SBSQ HOSP IP/OBS HIGH 50: CPT | Mod: GC

## 2020-07-08 PROCEDURE — 99232 SBSQ HOSP IP/OBS MODERATE 35: CPT | Mod: GC

## 2020-07-08 RX ORDER — MAGNESIUM SULFATE 500 MG/ML
2 VIAL (ML) INJECTION ONCE
Refills: 0 | Status: COMPLETED | OUTPATIENT
Start: 2020-07-08 | End: 2020-07-08

## 2020-07-08 RX ORDER — INSULIN LISPRO 100/ML
VIAL (ML) SUBCUTANEOUS
Refills: 0 | Status: DISCONTINUED | OUTPATIENT
Start: 2020-07-08 | End: 2020-07-14

## 2020-07-08 RX ORDER — INSULIN LISPRO 100/ML
10 VIAL (ML) SUBCUTANEOUS
Refills: 0 | Status: DISCONTINUED | OUTPATIENT
Start: 2020-07-08 | End: 2020-07-08

## 2020-07-08 RX ORDER — DEXTROSE 50 % IN WATER 50 %
12.5 SYRINGE (ML) INTRAVENOUS ONCE
Refills: 0 | Status: DISCONTINUED | OUTPATIENT
Start: 2020-07-08 | End: 2020-07-09

## 2020-07-08 RX ORDER — SODIUM CHLORIDE 9 MG/ML
1000 INJECTION, SOLUTION INTRAVENOUS
Refills: 0 | Status: DISCONTINUED | OUTPATIENT
Start: 2020-07-08 | End: 2020-07-09

## 2020-07-08 RX ORDER — INSULIN LISPRO 100/ML
VIAL (ML) SUBCUTANEOUS AT BEDTIME
Refills: 0 | Status: DISCONTINUED | OUTPATIENT
Start: 2020-07-08 | End: 2020-07-09

## 2020-07-08 RX ORDER — TACROLIMUS 5 MG/1
3 CAPSULE ORAL ONCE
Refills: 0 | Status: COMPLETED | OUTPATIENT
Start: 2020-07-08 | End: 2020-07-08

## 2020-07-08 RX ORDER — TACROLIMUS 5 MG/1
3 CAPSULE ORAL
Refills: 0 | Status: DISCONTINUED | OUTPATIENT
Start: 2020-07-08 | End: 2020-07-09

## 2020-07-08 RX ORDER — SENNA PLUS 8.6 MG/1
2 TABLET ORAL AT BEDTIME
Refills: 0 | Status: DISCONTINUED | OUTPATIENT
Start: 2020-07-08 | End: 2020-07-12

## 2020-07-08 RX ORDER — INSULIN GLARGINE 100 [IU]/ML
22 INJECTION, SOLUTION SUBCUTANEOUS AT BEDTIME
Refills: 0 | Status: DISCONTINUED | OUTPATIENT
Start: 2020-07-08 | End: 2020-07-08

## 2020-07-08 RX ORDER — HALOPERIDOL DECANOATE 100 MG/ML
5 INJECTION INTRAMUSCULAR ONCE
Refills: 0 | Status: COMPLETED | OUTPATIENT
Start: 2020-07-08 | End: 2020-07-08

## 2020-07-08 RX ORDER — INSULIN LISPRO 100/ML
8 VIAL (ML) SUBCUTANEOUS
Refills: 0 | Status: DISCONTINUED | OUTPATIENT
Start: 2020-07-08 | End: 2020-07-08

## 2020-07-08 RX ORDER — DEXTROSE 50 % IN WATER 50 %
25 SYRINGE (ML) INTRAVENOUS ONCE
Refills: 0 | Status: DISCONTINUED | OUTPATIENT
Start: 2020-07-08 | End: 2020-07-09

## 2020-07-08 RX ORDER — DEXTROSE 50 % IN WATER 50 %
15 SYRINGE (ML) INTRAVENOUS ONCE
Refills: 0 | Status: DISCONTINUED | OUTPATIENT
Start: 2020-07-08 | End: 2020-07-09

## 2020-07-08 RX ORDER — INSULIN GLARGINE 100 [IU]/ML
12 INJECTION, SOLUTION SUBCUTANEOUS AT BEDTIME
Refills: 0 | Status: DISCONTINUED | OUTPATIENT
Start: 2020-07-08 | End: 2020-07-11

## 2020-07-08 RX ORDER — POLYETHYLENE GLYCOL 3350 17 G/17G
17 POWDER, FOR SOLUTION ORAL DAILY
Refills: 0 | Status: DISCONTINUED | OUTPATIENT
Start: 2020-07-08 | End: 2020-07-12

## 2020-07-08 RX ORDER — QUETIAPINE FUMARATE 200 MG/1
25 TABLET, FILM COATED ORAL AT BEDTIME
Refills: 0 | Status: DISCONTINUED | OUTPATIENT
Start: 2020-07-08 | End: 2020-07-12

## 2020-07-08 RX ORDER — INSULIN LISPRO 100/ML
5 VIAL (ML) SUBCUTANEOUS
Refills: 0 | Status: DISCONTINUED | OUTPATIENT
Start: 2020-07-08 | End: 2020-07-11

## 2020-07-08 RX ORDER — HALOPERIDOL DECANOATE 100 MG/ML
2.5 INJECTION INTRAMUSCULAR ONCE
Refills: 0 | Status: COMPLETED | OUTPATIENT
Start: 2020-07-08 | End: 2020-07-08

## 2020-07-08 RX ORDER — FUROSEMIDE 40 MG
40 TABLET ORAL ONCE
Refills: 0 | Status: COMPLETED | OUTPATIENT
Start: 2020-07-08 | End: 2020-07-08

## 2020-07-08 RX ORDER — GLUCAGON INJECTION, SOLUTION 0.5 MG/.1ML
1 INJECTION, SOLUTION SUBCUTANEOUS ONCE
Refills: 0 | Status: DISCONTINUED | OUTPATIENT
Start: 2020-07-08 | End: 2020-07-12

## 2020-07-08 RX ADMIN — TACROLIMUS 3 MILLIGRAM(S): 5 CAPSULE ORAL at 10:54

## 2020-07-08 RX ADMIN — CHLORHEXIDINE GLUCONATE 1 APPLICATION(S): 213 SOLUTION TOPICAL at 05:55

## 2020-07-08 RX ADMIN — Medication 5 UNIT(S): at 17:24

## 2020-07-08 RX ADMIN — Medication 10 UNIT(S): at 07:33

## 2020-07-08 RX ADMIN — TACROLIMUS 3 MILLIGRAM(S): 5 CAPSULE ORAL at 20:36

## 2020-07-08 RX ADMIN — VALGANCICLOVIR 450 MILLIGRAM(S): 450 TABLET, FILM COATED ORAL at 11:54

## 2020-07-08 RX ADMIN — NYSTATIN CREAM 1 APPLICATION(S): 100000 CREAM TOPICAL at 17:24

## 2020-07-08 RX ADMIN — Medication 1 APPLICATION(S): at 11:53

## 2020-07-08 RX ADMIN — Medication 100000 UNIT(S): at 17:25

## 2020-07-08 RX ADMIN — TAMSULOSIN HYDROCHLORIDE 0.4 MILLIGRAM(S): 0.4 CAPSULE ORAL at 20:36

## 2020-07-08 RX ADMIN — Medication 1 TABLET(S): at 11:54

## 2020-07-08 RX ADMIN — OXYCODONE HYDROCHLORIDE 10 MILLIGRAM(S): 5 TABLET ORAL at 20:37

## 2020-07-08 RX ADMIN — QUETIAPINE FUMARATE 25 MILLIGRAM(S): 200 TABLET, FILM COATED ORAL at 20:36

## 2020-07-08 RX ADMIN — Medication 50 GRAM(S): at 09:13

## 2020-07-08 RX ADMIN — MYCOPHENOLATE MOFETIL 1000 MILLIGRAM(S): 250 CAPSULE ORAL at 20:36

## 2020-07-08 RX ADMIN — HALOPERIDOL DECANOATE 2.5 MILLIGRAM(S): 100 INJECTION INTRAMUSCULAR at 09:13

## 2020-07-08 RX ADMIN — Medication 100000 UNIT(S): at 11:53

## 2020-07-08 RX ADMIN — MYCOPHENOLATE MOFETIL 1000 MILLIGRAM(S): 250 CAPSULE ORAL at 08:35

## 2020-07-08 RX ADMIN — PANTOPRAZOLE SODIUM 40 MILLIGRAM(S): 20 TABLET, DELAYED RELEASE ORAL at 17:25

## 2020-07-08 RX ADMIN — Medication 81 MILLIGRAM(S): at 11:53

## 2020-07-08 RX ADMIN — Medication 40 MILLIGRAM(S): at 09:13

## 2020-07-08 RX ADMIN — HALOPERIDOL DECANOATE 5 MILLIGRAM(S): 100 INJECTION INTRAMUSCULAR at 15:35

## 2020-07-08 RX ADMIN — INSULIN GLARGINE 12 UNIT(S): 100 INJECTION, SOLUTION SUBCUTANEOUS at 20:44

## 2020-07-08 RX ADMIN — PANTOPRAZOLE SODIUM 40 MILLIGRAM(S): 20 TABLET, DELAYED RELEASE ORAL at 06:01

## 2020-07-08 RX ADMIN — Medication 100000 UNIT(S): at 01:04

## 2020-07-08 RX ADMIN — Medication 10 MILLIGRAM(S): at 06:01

## 2020-07-08 RX ADMIN — NYSTATIN CREAM 1 APPLICATION(S): 100000 CREAM TOPICAL at 06:04

## 2020-07-08 RX ADMIN — OXYCODONE HYDROCHLORIDE 5 MILLIGRAM(S): 5 TABLET ORAL at 06:02

## 2020-07-08 RX ADMIN — Medication 100000 UNIT(S): at 06:02

## 2020-07-08 NOTE — PROGRESS NOTE ADULT - SUBJECTIVE AND OBJECTIVE BOX
Transplant Surgery - Multidisciplinary Rounds  --------------------------------------------------------------  OLTx      Date:  7/2/2020       POD#6    Present: Patient seen with multidisciplinary team including Transplant Surgeon: Dr. Lucio, Transplant Hepatologist Dr. Mo, Transplant Pharmacist Ancelmo chen, NP Xiao Hurley and surgical resident Nael Quezada during am rounds and examined with Dr. Lucio. Disciplines not in attendance will be notified of the plan.     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     Re-admitted on 6/11:   ·	worsening HE  ·	UTI/VRE: tx with linezolid 6/15-22, bactrim DS 6/22 - 7/2  ·	MISA/Hyponatremia  ·	UGI bleed (melena) s/p EGD (6/22) c/w hemorrhagic duodenitis/gastritis; Grade 2 EV; requiring multiple transfusions  ·	Known h/o R foot ulcer (MRI neg for OM)  ·	s/p OLTx on 7/2/2020, post op liver doppler with patent vessels    OR details:  - L groin cutdown for vv bypass   - anastomosis: bicaval end-end/CBD duct-duct/HA & PV end-end, all standard anatomy.    - IOC with good flow  - Simulect induction. 500mg Solumedrol  - JPs x3 with T-Tube  - 14U pRBC, 14U FFP, 10 PLT, 11 CRYO, 500mL returned from cell saver, EBL 5L, UOP 1100mL    Recipient Info: ABO: A  CMV:  Neg  EBV Positive    Donor:  Donor ID:  [id number] Match: [id number]  Age: 29 ABO:  A1 High Risk:   No COD: Cardiovascular  Anti CMV positive, EBV IgG- positive  HepBcAb-neg, Hepatitis C-DANA- neg, Hepatitis C ab-neg    Interval events:  - Confused and hallucinating yesterday and overnight. Agitated and restless this am, placed on 1:1 with care provider.  Oriented x 2 on exam.  Tremors more mild today  - Tacro Level 10.4.  FK 3mg bid continued with steroid taper    Mag 2.0, repleted and started on oral MgSo4  - LFTs downtrending, T bili 1.7   INR 1.12  - Multiple BMs yesterday. No blood noted.  H/H stable 10.4/31.1  - Drains: T tube (zero), JP1 (340cc SS)      Potential discharge date: pending clinical improvement    Education: Medication    Plan of Care: See below    MEDICATIONS  (STANDING):  aspirin enteric coated 81 milliGRAM(s) Oral daily  chlorhexidine 2% Cloths 1 Application(s) Topical <User Schedule>  collagenase Ointment 1 Application(s) Topical daily  insulin glargine Injectable (LANTUS) 12 Unit(s) SubCutaneous at bedtime  insulin lispro (HumaLOG) corrective regimen sliding scale   SubCutaneous three times a day before meals  insulin lispro (HumaLOG) corrective regimen sliding scale   SubCutaneous at bedtime  insulin lispro Injectable (HumaLOG) 8 Unit(s) SubCutaneous three times a day before meals  magnesium oxide 400 milliGRAM(s) Oral two times a day with meals  mycophenolate mofetil 1000 milliGRAM(s) Oral <User Schedule>  NIFEdipine XL 30 milliGRAM(s) Oral daily  nystatin    Suspension [id number] Unit(s) Swish and Swallow four times a day  nystatin Cream 1 Application(s) Topical two times a day  pantoprazole  Injectable 40 milliGRAM(s) IV Push every 12 hours  polyethylene glycol 3350 17 Gram(s) Oral daily  QUEtiapine 25 milliGRAM(s) Oral at bedtime  senna 2 Tablet(s) Oral at bedtime  tacrolimus 3 milliGRAM(s) Oral <User Schedule>  tamsulosin 0.4 milliGRAM(s) Oral at bedtime  trimethoprim   80 mG/sulfamethoxazole 400 mG 1 Tablet(s) Oral daily  valGANciclovir 450 milliGRAM(s) Oral daily    MEDICATIONS  (PRN):  oxyCODONE    IR 5 milliGRAM(s) Oral every 4 hours PRN Moderate Pain (4 - 6)  oxyCODONE    IR 10 milliGRAM(s) Oral every 4 hours PRN Severe Pain (7 - 10)      PAST MEDICAL & SURGICAL HISTORY:  GIB (gastrointestinal bleeding)  GERD with esophagitis: Gastritis &amp; Non Bleeding Ulcers  Hepatic encephalopathy  Obesity  Fatty liver disease, nonalcoholic  Renal stones: 25 years ago  Hypertension  Neuropathy  Hypercholesteremia  Diabetes  S/P cholecystectomy      Vital Signs Last 24 Hrs  T(C): 36.5 (08 Jul 2020 11:00), Max: 36.8 (07 Jul 2020 20:00)  T(F): 97.7 (08 Jul 2020 11:00), Max: 98.2 (07 Jul 2020 20:00)  HR: 77 (08 Jul 2020 12:00) (73 - 81)  BP: 117/56 (08 Jul 2020 12:00) (101/53 - 136/67)  BP(mean): 79 (08 Jul 2020 12:00) (73 - 95)  RR: 24 (08 Jul 2020 12:00) (12 - 30)  SpO2: 93% (08 Jul 2020 12:00) (90% - 100%)    I&O's Summary    07 Jul 2020 07:01  -  08 Jul 2020 07:00  --------------------------------------------------------  IN: 420 mL / OUT: 2540 mL / NET: -2120 mL    08 Jul 2020 07:01  -  08 Jul 2020 12:25  --------------------------------------------------------  IN: 530 mL / OUT: 60 mL / NET: 470 mL                              10.4   4.70  )-----------( 68       ( 08 Jul 2020 00:19 )             31.3     07-08    131<L>  |  97  |  35<H>  ----------------------------<  256<H>  4.6   |  25  |  1.11    Ca    8.5      08 Jul 2020 00:19  Phos  3.0     07-08  Mg     2.0     07-08    TPro  4.7<L>  /  Alb  2.9<L>  /  TBili  1.7<H>  /  DBili  0.8<H>  /  AST  41<H>  /  ALT  123<H>  /  AlkPhos  117  07-08    Tacrolimus (), Serum: 10.2 ng/mL (07-08 @ 07:56)        Culture - Blood (collected 07-03-20 @ 11:00)  Source: .Blood Blood  Final Report (07-08-20 @ 11:01):    No Growth Final    Culture - Blood (collected 07-03-20 @ 11:00)  Source: .Blood Blood  Final Report (07-08-20 @ 11:01):    No Growth Final    Culture - Urine (collected 07-03-20 @ 10:11)  Source: .Urine Catheterized  Final Report (07-04-20 @ 07:24):    No growth    Culture - Surgical Swab (collected 07-03-20 @ 01:05)  Source: .Surgical Swab abdominal ascites culture  Final Report (07-07-20 @ 19:08):    No growth at 5 days    Culture - Fungal, Other (collected 07-03-20 @ 01:05)  Source: .Other Other  Preliminary Report (07-06-20 @ 07:35):    Testing in progress        Review of systems  Gen: fatigue/weakness/confusion/agitation  Skin: no rashes  Head/Eyes/Ears/Mouth: no headache, no changes in vision  Respiratory: no SOB  CV: no chest pain  GI: no constipation, nausea or vomiting  : voiding  MSK: + LE edema  Neuro:  no seizures, + tremulous, agitated  Heme: no easy bruising  Endo: no heat/cold intolerance  Psych: no significant nervousness, anxiety, stress  All other systems were reviewed and are negative, except as noted     PHYSICAL EXAM:  Constitutional: Well developed / well nourished  Eyes: mild icterus  ENMT: NC/AT  Neck: supple  Respiratory: CTA b/l  Cardiovascular: RRR  Gastrointestinal: Soft abdomen, mildly distended; + mild incisional tenderness, incision intact with staples, T-tube w/ no drainage.  JPs x 1 with ss output  Genitourinary: voiding spontaneously/condom cath  Extremities: SCD's in place and working bilaterally. L groin cutdown site c/d/i  Vascular: Palpable dp pulses bilaterally  Neurological: AO x 2, upper body tremors improved today, agitated, hallucinating, confused  Skin: no rashes, ulcerations, lesions post-op  Psychiatric: awake, alert and responsive

## 2020-07-08 NOTE — PROGRESS NOTE ADULT - SUBJECTIVE AND OBJECTIVE BOX
HISTORY  64y Male c/b small esophageal varices (12/2019), portal hypertensive gastropathy, ascites, hx SBP, hx hepatic encephalopathy, GAVE syndrome s/p APC, hx duodenal ulcer (5/2019), HTN, HLD, DM, HFpEF (EF 70%), recent ESBL E coli prostate abscess (3/2020 s/p 4 weeks ertapenem), orthostatic hypotension ( on midodrine) who was initially admitted to medicine for hepatic encephalopathy. Hospital course c/b MISA, VRE UTI, acute blood loss anemia, received blood transfusion, EGD done 6/22 with Grade II esophageal varices, acute gastritis with hemorrhage and acute duodenitis with hemorrhage. Patient had worsening mental status, concerning decompensating liver cirrhosis, hepatic encephalopathy patient then transferred to SICU for further care.      24 HOUR EVENTS:  - Insulin gtt transitioned to ISS. Glucose still >200. Endo consult placed. Pt started on 16u lantus qhs and 10u premeal insulin.  - Meropenem for heel wound dc'ed.  - Tacro level 10.4, tacro 2mg BID as per transplant      NEURO  Exam: AOx4, +confusion, +hallucinations  Meds: oxyCODONE    IR 5 milliGRAM(s) Oral every 4 hours PRN Moderate Pain (4 - 6)  oxyCODONE    IR 10 milliGRAM(s) Oral every 4 hours PRN Severe Pain (7 - 10)  [x] Adequacy of sedation and pain control has been assessed and adjusted      RESPIRATORY  RR: 21 (07-07-20 @ 21:00) (13 - 25)  SpO2: 100% (07-07-20 @ 21:00) (88% - 100%)  Wt(kg): --  Exam: unlabored  Mechanical Ventilation:   [N/A] Extubation Readiness Assessed  Meds:       CARDIOVASCULAR  HR: 75 (07-07-20 @ 21:00) (75 - 163)  BP: 132/66 (07-07-20 @ 21:00) (104/53 - 140/63)  BP(mean): 91 (07-07-20 @ 21:00) (73 - 91)  ABP: --  ABP(mean): --  Wt(kg): --  CVP(cm H2O): --  VBG - ( 07 Jul 2020 01:08 )  pH: 7.42  /  pCO2: 46    /  pO2: 51    / HCO3: 30    / Base Excess: 5.3   /  SaO2: 85     Lactate: 1.9    Exam: regular rate and rhythm  Cardiac Rhythm: normal sinus  Perfusion     [x]Adequate   [ ]Inadequate  Mentation   [x]Normal       [ ]Reduced  Extremities  [x]Warm         [ ]Cool  Volume Status [ ]Hypervolemic [x]Euvolemic [ ]Hypovolemic  Meds: labetalol Injectable 10 milliGRAM(s) IV Push every 1 hour PRN systolic blood pressure GREATER THAN 170 mmHg or diastolic blood pressure GREATER THAN 100 mmHg  NIFEdipine XL 30 milliGRAM(s) Oral daily  tamsulosin 0.4 milliGRAM(s) Oral at bedtime      GI/NUTRITION  Exam: soft, nontender, nondistended, incision C/D/I, ANDREINA drain x1 with serosanguinous output.  Diet: Regular, consistent carb  Meds: pantoprazole  Injectable 40 milliGRAM(s) IV Push every 12 hours      GENITOURINARY  I&O's Detail    07-06 @ 07:01  -  07-07 @ 07:00  --------------------------------------------------------  IN:    insulin regular Infusion: 37 mL    Oral Fluid: 80 mL    Solution: 50 mL    Solution: 600 mL  Total IN: 767 mL    OUT:    Drain: 10 mL    Drain: 260 mL    Incontinent per Condom Catheter: 1000 mL    Voided: 1500 mL  Total OUT: 2770 mL    Total NET: -2003 mL    07-07 @ 07:01  -  07-08 @ 00:03  --------------------------------------------------------  IN:    Solution: 50 mL    Solution: 250 mL  Total IN: 300 mL    OUT:    Drain: 190 mL    Incontinent per Condom Catheter: 600 mL    Voided: 200 mL  Total OUT: 990 mL    Total NET: -690 mL    07-07    131<L>  |  95<L>  |  35<H>  ----------------------------<  171<H>  4.1   |  22  |  1.07    Ca    8.8      07 Jul 2020 15:01  Phos  2.7     07-07  Mg     1.8     07-07    TPro  5.1<L>  /  Alb  3.3  /  TBili  1.9<H>  /  DBili  0.9<H>  /  AST  63<H>  /  ALT  148<H>  /  AlkPhos  113  07-07    [ ] Beasley catheter, indication: N/A, voiding spontaneously  Meds: magnesium oxide 400 milliGRAM(s) Oral two times a day with meals  sodium chloride 0.9% lock flush 10 milliLiter(s) IV Push every 1 hour PRN Pre/post blood products, medications, blood draw, and to maintain line patency      HEMATOLOGIC  Meds: aspirin enteric coated 81 milliGRAM(s) Oral daily    [ ] VTE Prophylaxis: SCDs                        10.7   5.43  )-----------( 78       ( 07 Jul 2020 15:01 )             32.2     PT/INR - ( 07 Jul 2020 15:01 )   PT: 13.5 sec;   INR: 1.19 ratio     PTT - ( 07 Jul 2020 15:01 )  PTT:25.9 sec  Transfusion     [ ] PRBC   [ ] Platelets   [ ] FFP   [ ] Cryoprecipitate      INFECTIOUS DISEASES  WBC Count: 5.43 K/uL (07-07 @ 15:01)  WBC Count: 4.71 K/uL (07-07 @ 01:12)    RECENT CULTURES:  Specimen Source: .Blood Blood  Date/Time: 07-03 @ 11:00  Culture Results:   No growth to date.  Gram Stain: --  Organism: --  Specimen Source: .Urine Catheterized  Date/Time: 07-03 @ 10:11  Culture Results:   No growth  Gram Stain: --  Organism: --  Specimen Source: .Other Other  Date/Time: 07-03 @ 01:05  Culture Results:   Testing in progress  Gram Stain: --  Organism: --    Meds: mycophenolate mofetil 1000 milliGRAM(s) Oral <User Schedule>  nystatin    Suspension 888353 Unit(s) Swish and Swallow four times a day  trimethoprim   80 mG/sulfamethoxazole 400 mG 1 Tablet(s) Oral daily  valGANciclovir 450 milliGRAM(s) Oral daily      ENDOCRINE  CAPILLARY BLOOD GLUCOSE  POCT Blood Glucose.: 221 mg/dL (07 Jul 2020 22:00)  POCT Blood Glucose.: 235 mg/dL (07 Jul 2020 16:24)  POCT Blood Glucose.: 205 mg/dL (07 Jul 2020 11:21)  POCT Blood Glucose.: 104 mg/dL (07 Jul 2020 08:20)  POCT Blood Glucose.: 104 mg/dL (07 Jul 2020 06:49)  POCT Blood Glucose.: 124 mg/dL (07 Jul 2020 06:11)  POCT Blood Glucose.: 140 mg/dL (07 Jul 2020 05:02)  POCT Blood Glucose.: 150 mg/dL (07 Jul 2020 03:59)  POCT Blood Glucose.: 176 mg/dL (07 Jul 2020 02:55)  POCT Blood Glucose.: 217 mg/dL (07 Jul 2020 01:54)  POCT Blood Glucose.: 274 mg/dL (07 Jul 2020 00:50)    Meds: insulin glargine Injectable (LANTUS) 16 Unit(s) SubCutaneous at bedtime  insulin lispro (HumaLOG) corrective regimen sliding scale   SubCutaneous three times a day before meals  insulin lispro (HumaLOG) corrective regimen sliding scale   SubCutaneous at bedtime  insulin lispro Injectable (HumaLOG) 10 Unit(s) SubCutaneous three times a day before meals  methylPREDNISolone sodium succinate Injectable 10 milliGRAM(s) IV Push two times a day  methylPREDNISolone sodium succinate Injectable   IV Push       ACCESS DEVICES:  [x] Peripheral IV  [ ] Central Venous Line	[ ] R	[ ] L	[ ] IJ	[ ] Fem	[ ] SC	Placed:   [ ] Arterial Line		[ ] R	[ ] L	[ ] Fem	[ ] Rad	[ ] Ax	Placed:   [ ] PICC:					[ ] Mediport  [ ] Urinary Catheter, Date Placed:   [x] Necessity of urinary, arterial, and venous catheters discussed    OTHER MEDICATIONS:  chlorhexidine 2% Cloths 1 Application(s) Topical <User Schedule>  collagenase Ointment 1 Application(s) Topical daily  nystatin Cream 1 Application(s) Topical two times a day      CODE STATUS: Full code      IMAGING:

## 2020-07-08 NOTE — PROGRESS NOTE ADULT - SUBJECTIVE AND OBJECTIVE BOX
Diabetes Follow up note:    Chief complaint: T2DM/steroid induced hyperglyemia    Interval Hx: BG values in 200s yesterday, improved to 180s this AM. Pt seen at bedside this AM. Agitated w/1:1 present. Able to tell me the month and where he is (hospital). Tolerating POs, ate breakfast this AM.     Review of Systems:  General: no complaints  GI: Tolerating POs. Denies N/V/D/Abd pain  CV: Denies CP/SOB  ENDO: No S&Sx of hypoglycemia  MEDS:  insulin glargine Injectable (LANTUS) 12 Unit(s) SubCutaneous at bedtime  insulin lispro (HumaLOG) corrective regimen sliding scale   SubCutaneous three times a day before meals  insulin lispro (HumaLOG) corrective regimen sliding scale   SubCutaneous at bedtime  insulin lispro Injectable (HumaLOG) 8 Unit(s) SubCutaneous three times a day before meals    nystatin    Suspension 899676 Unit(s) Swish and Swallow four times a day  trimethoprim   80 mG/sulfamethoxazole 400 mG 1 Tablet(s) Oral daily  valGANciclovir 450 milliGRAM(s) Oral daily    Allergies    codeine (Anaphylaxis)        PE:  General: Male lying in bed. Restless.   Vital Signs Last 24 Hrs  T(C): 36.5 (08 Jul 2020 11:00), Max: 36.8 (07 Jul 2020 20:00)  T(F): 97.7 (08 Jul 2020 11:00), Max: 98.2 (07 Jul 2020 20:00)  HR: 77 (08 Jul 2020 12:00) (73 - 81)  BP: 117/56 (08 Jul 2020 12:00) (101/53 - 136/67)  BP(mean): 79 (08 Jul 2020 12:00) (73 - 95)  RR: 24 (08 Jul 2020 12:00) (12 - 30)  SpO2: 93% (08 Jul 2020 12:00) (90% - 100%)  Abd: Soft, NT,ND, Obese.   Extremities: Warm. trace edema   Neuro: A&O X2. Delirious     LABS:  POCT Blood Glucose.: 64 mg/dL (07-08-20 @ 11:56)  POCT Blood Glucose.: 64 mg/dL (07-08-20 @ 11:55)  POCT Blood Glucose.: 188 mg/dL (07-08-20 @ 07:32)  POCT Blood Glucose.: 221 mg/dL (07-07-20 @ 22:00)  POCT Blood Glucose.: 235 mg/dL (07-07-20 @ 16:24)  POCT Blood Glucose.: 205 mg/dL (07-07-20 @ 11:21)  POCT Blood Glucose.: 104 mg/dL (07-07-20 @ 08:20)  POCT Blood Glucose.: 104 mg/dL (07-07-20 @ 06:49)  POCT Blood Glucose.: 124 mg/dL (07-07-20 @ 06:11)  POCT Blood Glucose.: 140 mg/dL (07-07-20 @ 05:02)  POCT Blood Glucose.: 150 mg/dL (07-07-20 @ 03:59)  POCT Blood Glucose.: 176 mg/dL (07-07-20 @ 02:55)  POCT Blood Glucose.: 217 mg/dL (07-07-20 @ 01:54)  POCT Blood Glucose.: 274 mg/dL (07-07-20 @ 00:50)  POCT Blood Glucose.: 331 mg/dL (07-06-20 @ 23:54)  POCT Blood Glucose.: 317 mg/dL (07-06-20 @ 23:08)  POCT Blood Glucose.: 320 mg/dL (07-06-20 @ 21:25)  POCT Blood Glucose.: 232 mg/dL (07-06-20 @ 16:41)  POCT Blood Glucose.: 213 mg/dL (07-06-20 @ 10:40)  POCT Blood Glucose.: 120 mg/dL (07-06-20 @ 06:50)  POCT Blood Glucose.: 121 mg/dL (07-06-20 @ 06:08)  POCT Blood Glucose.: 154 mg/dL (07-06-20 @ 05:02)  POCT Blood Glucose.: 184 mg/dL (07-06-20 @ 04:04)  POCT Blood Glucose.: 197 mg/dL (07-06-20 @ 03:06)  POCT Blood Glucose.: 211 mg/dL (07-06-20 @ 01:57)  POCT Blood Glucose.: 238 mg/dL (07-06-20 @ 00:56)  POCT Blood Glucose.: 265 mg/dL (07-05-20 @ 23:53)  POCT Blood Glucose.: 269 mg/dL (07-05-20 @ 22:57)  POCT Blood Glucose.: 281 mg/dL (07-05-20 @ 20:52)  POCT Blood Glucose.: 284 mg/dL (07-05-20 @ 17:35)  POCT Blood Glucose.: 137 mg/dL (07-05-20 @ 13:05)                            10.4   4.70  )-----------( 68       ( 08 Jul 2020 00:19 )             31.3       07-08    131<L>  |  97  |  35<H>  ----------------------------<  256<H>  4.6   |  25  |  1.11    Ca    8.5      08 Jul 2020 00:19  Phos  3.0     07-08  Mg     2.0     07-08    TPro  4.7<L>  /  Alb  2.9<L>  /  TBili  1.7<H>  /  DBili  0.8<H>  /  AST  41<H>  /  ALT  123<H>  /  AlkPhos  117  07-08      A1C with Estimated Average Glucose Result: 5.2 % (06-14-20 @ 11:00)  A1C with Estimated Average Glucose Result: 5.4 % (06-12-20 @ 08:26)  A1C with Estimated Average Glucose Result: 4.8 % (04-26-20 @ 09:39)  Hemoglobin A1C, Whole Blood: 6.4 % (02-12-20 @ 17:08)  Hemoglobin A1C, Whole Blood: 5.8 % (12-04-19 @ 08:38)          Contact number: kit [phone] or [phone]

## 2020-07-08 NOTE — PROGRESS NOTE ADULT - PROBLEM SELECTOR PLAN 1
-test BG AC/HS  -Decrease Lantus 12 units QHS  -Decrease Humalog 5 units AC meals (hold if not eating  -can change to Humalog low correction scale AC and Low HS scale  DISPO: basal/bolus, with doses TBD  -Pt can resume f/u with Dr. Mathur or   [facility] [address] [phone]

## 2020-07-08 NOTE — PROGRESS NOTE ADULT - ASSESSMENT
ASSESSMENT:  The patient is a 64 year old male with PMHx of insulin dependent diabetes mellitus, hyperlipidemia, obesity, heart failure with preserved ejection fraction with mild LV diastolic dysfunction, MGUS, chronic anemia, duodenal ulcer, GAVE,  duodenal AVM s/p APC (last on 10/11/19) who presented with 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 The patient has a history of multiple recent hospitalizations due to UTIs, emphysematous cystitis and prostate abscess. The patient was re-admitted with hepatic encephalopathy and VRE UTI, also with MISA on CKD. S/p liver transplant 7/2/20.       Plan:  1) s/p OLT   Continue bactrim, valcyte for prophylaxis   Monitor patient for tremors - monitor tacrolimus dose   Removal of drains and lines as per surgical transplant team    2) Right heel wound  Podiatry on board    SICU, transplant, and hepatology on board   Off antibiotics at this point    3) Encephalopathy  Monitor tacrolimus doses as above    Charlie Lafleur MD PGY-4   Fellow, Infectious Diseases   Pager: [phone]  If no response, after 5pm and on weekends: Call [phone]

## 2020-07-08 NOTE — PROGRESS NOTE ADULT - ASSESSMENT
ASSESSMENT:  64y Male c/b small esophageal varices (12/2019), portal hypertensive gastropathy, ascites, hx SBP, hx hepatic encephalopathy, GAVE syndrome s/p APC, hx duodenal ulcer (5/2019), HTN, HLD, DM, HFpEF (EF 70%), recent ESBL E coli prostate abscess (3/2020 s/p 4 weeks ertapenem), orthostatic hypotension ( on midodrine) who was initially admitted to medicine for hepatic encephalopathy. Hospital course c/b MISA, VRE UTI, acute blood loss anemia, received blood transfusion, EGD done 6/22 with Grade II esophageal varices, acute gastritis with hemorrhage and acute duodenitis with hemorrhage. Patient had worsening mental status, concerning decompensating liver cirrhosis, hepatic encephalopathy patient then transferred to SICU for further care. Pt now POD#6 s/p liver transplant.    PLAN:   Neurologic: tremors, hallucinations, confusion 2/2 ?tacro toxicity  - Oxy 5/10 prn for pain  - Neuro status worsening, appears more confused    Respiratory:   - IS, OOBTC as tolerated  - Continuous pulse oximetry    Cardiovascular: Intermittent hypertension  - Nifedipine 30mg daily  - Labetalol PRN for SBP goal <145  - ASA  - Monitor hemodynamic status    Gastrointestinal/Nutrition: GIB, JEAN cirrhosis, GAVE syndrome. S/p liver transplant   - Regular, consistent carb diet. Tolerating well.  - Protonix 40mg BID  - Trend LFTs, currently downtrending    Renal: urinary retention, hyponatremia  - C/w flomax for urinary retention  - Trend BMPs, replete lytes prn    Heme: coagulopathy  - Venodynes for mechanical VTE prophylaxis, holding chemical VTE prophylaxis in the setting of coagulopathy/GIB.  - Trend H/H, coags    Infectious Disease: immunosuppresion   - Continue PPx as per transplant Bactrim, valcyte  - Continue immunosuppression as per transplant solumedrol, cellcept, tacro    Endo: DM type II, HLD  - ISS, lantus 16u qhs, 10u premeal/qhs insulin  - Monitor FS premeal and qhs    Disposition:  - SICU full code

## 2020-07-08 NOTE — PROGRESS NOTE ADULT - ASSESSMENT
65 yo M with hx of T2DM uncontrolled due to steroids x 10 years with neuropathy and R DFU of the heel, HTN, HLD, MGUS, and decompensated JEAN cirrhosis s/p liver x-plant on 7/2/2020 admitted 6/11/2020 with confusion secondary to liver failure and encephalopathy. Endocrine consulted for glycemic control. On steroid taper now w/decreasing insulin requirements and hypoglycemic event today. Tolerating POs. On 1:1 for agitation/delirium. Will adjust basal/bolus regimen. To start on Prednisone 5mg daily so will need to evaluate as steroid effect dissipates. BG goal (100-180mg/dl).

## 2020-07-08 NOTE — PROGRESS NOTE ADULT - ATTENDING COMMENTS
65 yo M with HLD, IDDM, GERD, HFpEF with mild LV diastolic dysfunction, chronic right foot ulcer, recurrent UTIs, and decompensated JEAN cirrhosis, s/p OLT 7/2 (standard criteria donor, standard vascular anastomoses with duct-to-duct biliary anastomosis over T-tube, CMV D+/R-, EBV D+/R+, received Simulect induction).    Hepatic allograft is good, with improving liver enzymes, but still having delirium today, now with episodes of agitation. Suspect steroid psychosis and may also have ICU delirium. Recommend frequent re-orientation and calming interventions, to try to minimize his need for chemical sedation. Steroids decreased to 10 mg today and will be 5 mg/day starting tomorrow. Also with tremors on exam, but not as pronounced as 2 days ago, presumed secondary to tacrolimus which we will continue for now and adjust by trough level (goal 8-10 ng/mL). Continuing prophylaxis with nystatin, Valcyte, and Bactrim.    Chronic right foot ulcer does not appear infected. Being monitored off antibiotics. Continuing wound care.    Renal function is stable, and continuing with diuresis.    Needs daily PT for debility.    Please don't hesitate to call with any questions/concerns.    Letitia Mo M.D., Ph.D.  Transplant Hepatology  Cell: [phone]

## 2020-07-08 NOTE — PROGRESS NOTE ADULT - PROBLEM SELECTOR PLAN 3
-defer statin in the setting of recent liver x-plant    discussed w/pt, RN and team  pager: [phone]   cell: [phone]  office: [phone]    -I spent a total time of 26 mins with the patient at bedside/on unit of which more than 50% of time was spent on counseling/coordination of care.

## 2020-07-08 NOTE — PROGRESS NOTE ADULT - SUBJECTIVE AND OBJECTIVE BOX
Infectious Disease Progress Note     Patient is a 64y old  Male who presents with a chief complaint of Liver failure, hepatic encephalopathy (08 Jul 2020 12:37)    HPI:  Interval History/ROS: Patient is a 64 year old male with past medical history of insulin dependent diabtes mellitus, hyperlipidemia, obesity, heart failure with preserved ejection fraction with mild LV diastolic dysfunction, MGUS, chronic anemia, duodenal ulcer, GAVE,  duodenal AVM s/p APC (last on 10/11/19) who presented with 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 The patient was re-admitted with hepatic encephalopathy and VRE UTI, also with MISA on CKD. S/p liver transplant 7/2. 6/14/20: blood cultures positive for VRE - resistant to vancomycin R > 16.    Today, he remains comfortable in no acute discress. Denied fevers, chills, night sweats, rash, cough, coryza, dysuria, loose stools, recent travel, or sick contacts.      prior hospital charts reviewed [x]  primary team notes reviewed [x]  other consultant notes reviewed [ x]      PAST MEDICAL & SURGICAL HISTORY:  GIB (gastrointestinal bleeding)  GERD with esophagitis: Gastritis &amp; Non Bleeding Ulcers  Hepatic encephalopathy  Obesity  Fatty liver disease, nonalcoholic  Renal stones: 25 years ago  Hypertension  Neuropathy  Hypercholesteremia  Diabetes  S/P cholecystectomy    Allergies  codeine (Anaphylaxis)    ANTIMICROBIALS:    nystatin    Suspension 708601 four times a day  trimethoprim   80 mG/sulfamethoxazole 400 mG 1 daily  valGANciclovir 450 daily    OTHER MEDS: MEDICATIONS  (STANDING):  aspirin enteric coated 81 daily  dextrose 40% Gel 15 once PRN  dextrose 50% Injectable 12.5 once  dextrose 50% Injectable 25 once  dextrose 50% Injectable 25 once  glucagon  Injectable 1 once PRN  insulin glargine Injectable (LANTUS) 12 at bedtime  insulin lispro (HumaLOG) corrective regimen sliding scale  three times a day before meals  insulin lispro (HumaLOG) corrective regimen sliding scale  at bedtime  insulin lispro Injectable (HumaLOG) 5 three times a day before meals  mycophenolate mofetil 1000 <User Schedule>  NIFEdipine XL 30 daily  oxyCODONE    IR 5 every 4 hours PRN  oxyCODONE    IR 10 every 4 hours PRN  pantoprazole  Injectable 40 every 12 hours  polyethylene glycol 3350 17 daily  QUEtiapine 25 at bedtime  senna 2 at bedtime  tacrolimus 3 <User Schedule>  tamsulosin 0.4 at bedtime    SOCIAL HISTORY:   hx smoking  non-smoker    FAMILY HISTORY:  Family history of type 2 diabetes mellitus  Family history of hypertension  Family history of stomach cancer    REVIEW OF SYSTEMS  [  ] ROS unobtainable because:    [ x] All other systems negative except as noted below:	    Constitutional:  [ ] fever [ ] chills  [ ] weight loss  [ ] weakness  Skin:  [ ] rash [ ] phlebitis	  Eyes: [ ] icterus [ ] pain  [ ] discharge	  ENMT: [ ] sore throat  [ ] thrush [ ] ulcers [ ] exudates  Respiratory: [ ] dyspnea [ ] hemoptysis [ ] cough [ ] sputum	  Cardiovascular:  [ ] chest pain [ ] palpitations [ ] edema	  Gastrointestinal:  [ ] nausea [ ] vomiting [ ] diarrhea [ ] constipation [ ] pain	  Genitourinary:  [ ] dysuria [ ] frequency [ ] hematuria [ ] discharge [ ] flank pain  [ ] incontinence  Musculoskeletal:  [ ] myalgias [ ] arthralgias [ ] arthritis  [ ] back pain  Neurological:  [ ] headache [ ] seizures  [ ] confusion/altered mental status  Psychiatric:  [ ] anxiety [ ] depression	  Hematology/Lymphatics:  [ ] lymphadenopathy  Endocrine:  [ ] adrenal [ ] thyroid  Allergic/Immunologic:	 [ ] transplant [ ] seasonal    Vital Signs Last 24 Hrs  T(F): 97.7 (07-08-20 @ 11:00), Max: 99.5 (07-03-20 @ 04:00)  Vital Signs Last 24 Hrs  HR: 77 (07-08-20 @ 12:00) (73 - 81)  BP: 117/56 (07-08-20 @ 12:00) (101/53 - 136/67)  RR: 24 (07-08-20 @ 12:00)  SpO2: 93% (07-08-20 @ 12:00) (90% - 100%)  Wt(kg): --    EXAM:  Constitutional: Not in acute distress  Eyes: pupils bilaterally reactive to light. No icterus.  Oral cavity: Clear, no lesions  Neck: No neck vein distension noted  RS: Chest clear to auscultation bilaterally. No wheeze/rhonchi/crepitations.  CVS: S1, S2 heard. Regular rate and rhythm. No murmurs/rubs/gallops.  Abdomen: Soft. No guarding/rigidity/tenderness, wound site without erythema, edema, or purulence   : No acute abnormalities  Extremities: Warm. No pedal edema, + bilateral bandage around legs  Skin: No lesions noted  Vascular: No evidence of phlebitis  Neuro: Alert, oriented to time/place/person                          10.4   4.70  )-----------( 68       ( 08 Jul 2020 00:19 )             31.3     07-08    131<L>  |  97  |  35<H>  ----------------------------<  256<H>  4.6   |  25  |  1.11    Ca    8.5      08 Jul 2020 00:19  Phos  3.0     07-08  Mg     2.0     07-08    TPro  4.7<L>  /  Alb  2.9<L>  /  TBili  1.7<H>  /  DBili  0.8<H>  /  AST  41<H>  /  ALT  123<H>  /  AlkPhos  117  07-08    MICROBIOLOGY:  No growth to date. (07-03 @ 11:00)  Culture Results:   No growth to date. (07-03 @ 11:00)  Culture Results:   No growth (07-03 @ 10:11)  Culture Results:   Testing in progress (07-03 @ 01:05)  Culture Results:   No growth (07-03 @ 01:05)    CMV IgG Antibody: <0.20 U/mL (12-04-19 @ 08:33)  Toxoplasma IgG Screen: <3.0 IU/mL (12-04-19 @ 08:33)    RADIOLOGY:  06/18/20:  MRI right ankle with and without contrast: Evaluation for soft tissue infection is limited without intravenous contrast. Although there is heterogeneous marrow signal there is no skin ulceration, sinus tract or abscess identified with adjacent marrow signal abnormality suggest osteomyelitis.  There is edema within the abductor hallucis and flexor digitorum brevis muscles. There is minimal edema and marked atrophy within the adductor digit minimi muscle. These findings are likely related to denervation edema. The tendons at the ankle and hindfoot are intact. There is mild flexor hallucis longus tenosynovitis. There is mild spurring at the Achilles insertion. There is marked thickening at the middle cord of the plantar fascia which contains a small ossicle which is chronic. There is also focal thickening at the proximal lateral cortex of the plantar fascia likely related to old injury.   There is marked spurring dorsally at the second tarsometatarsal joint. There is edema within the sinus Tarsi with scarring.   The Lisfranc ligament is at the edge of the field although the interosseous ligament is grossly intact. The dorsal component is markedly thickened and chronically degenerated or torn. The distal syndesmotic ligaments are intact. The lateral collateral ligaments are intact. There is thickening at the deltoid ligament which is continuous.  Impression: Evaluation for soft tissue infection is limited without intravenous contrast. Heterogeneous marrow signal without an adjacent skin ulceration or fluid collection to suggest osteomyelitis.      ASSESSMENT:  The patient is a 64 year old male with PMHx of insulin dependent diabetes mellitus, hyperlipidemia, obesity, heart failure with preserved ejection fraction with mild LV diastolic dysfunction, MGUS, chronic anemia, duodenal ulcer, GAVE,  duodenal AVM s/p APC (last on 10/11/19) who presented with 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 The patient has a history of multiple recent hospitalizations due to UTIs, emphysematous cystitis and prostate abscess. The patient was re-admitted with hepatic encephalopathy and VRE UTI, also with MISA on CKD. S/p liver transplant 7/2/20.       Plan:  1) ESBL UTI hx w/ hx of prostatic abscess  Completed course of meropenem, continue to monitor patient off of antibiotics    2) s/p OLT   Continue bactrim, valcyte for prophylaxis   Monitor patient for tremors - monitor tracrolimus dose   Patient to remove drain and lines as per surgical transplant team    3) Right heel wound  Podiatry on board    SICU, transplant, and hepatology on board     Charlie Lafleur MD PGY-4   Fellow, Infectious Diseases   Pager: [phone]  If no response, after 5pm and on weekends: Call [phone] Infectious Disease Progress Note     Patient is a 64y old  Male who presents with a chief complaint of Liver failure, hepatic encephalopathy (08 Jul 2020 12:37)    HPI:  Interval History/ROS: Patient is a 64 year old male with past medical history of insulin dependent diabtes mellitus, hyperlipidemia, obesity, heart failure with preserved ejection fraction with mild LV diastolic dysfunction, MGUS, chronic anemia, duodenal ulcer, GAVE,  duodenal AVM s/p APC (last on 10/11/19) who presented with 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 The patient was re-admitted with hepatic encephalopathy and VRE UTI, also with MISA on CKD. S/p liver transplant 7/2.     Today, he remains comfortable in no acute distress however, reportedly agitated overnight. Confused at time of my assessment.     REVIEW OF SYSTEMS  [  ] ROS unobtainable because:    [ x] All other systems negative except as noted below:	    Constitutional:  [ ] fever [ ] chills  [ ] weight loss  [ ] weakness  Skin:  [ ] rash [ ] phlebitis	  Eyes: [ ] icterus [ ] pain  [ ] discharge	  ENMT: [ ] sore throat  [ ] thrush [ ] ulcers [ ] exudates  Respiratory: [ ] dyspnea [ ] hemoptysis [ ] cough [ ] sputum	  Cardiovascular:  [ ] chest pain [ ] palpitations [ ] edema	  Gastrointestinal:  [ ] nausea [ ] vomiting [ ] diarrhea [ ] constipation [ ] pain	  Genitourinary:  [ ] dysuria [ ] frequency [ ] hematuria [ ] discharge [ ] flank pain  [ ] incontinence  Musculoskeletal:  [ ] myalgias [ ] arthralgias [ ] arthritis  [ ] back pain  Neurological:  [ ] headache [ ] seizures  [x] confusion/altered mental status  Psychiatric:  [ ] anxiety [ ] depression	  Hematology/Lymphatics:  [ ] lymphadenopathy  Endocrine:  [ ] adrenal [ ] thyroid  Allergic/Immunologic:	 [ ] transplant [ ] seasonal        PAST MEDICAL & SURGICAL HISTORY:  GIB (gastrointestinal bleeding)  GERD with esophagitis: Gastritis &amp; Non Bleeding Ulcers  Hepatic encephalopathy  Obesity  Fatty liver disease, nonalcoholic  Renal stones: 25 years ago  Hypertension  Neuropathy  Hypercholesteremia  Diabetes  S/P cholecystectomy    Allergies  codeine (Anaphylaxis)    ANTIMICROBIALS:    nystatin    Suspension [id number] four times a day  trimethoprim   80 mG/sulfamethoxazole 400 mG 1 daily  valGANciclovir 450 daily    OTHER MEDS: MEDICATIONS  (STANDING):  aspirin enteric coated 81 daily  dextrose 40% Gel 15 once PRN  dextrose 50% Injectable 12.5 once  dextrose 50% Injectable 25 once  dextrose 50% Injectable 25 once  glucagon  Injectable 1 once PRN  insulin glargine Injectable (LANTUS) 12 at bedtime  insulin lispro (HumaLOG) corrective regimen sliding scale  three times a day before meals  insulin lispro (HumaLOG) corrective regimen sliding scale  at bedtime  insulin lispro Injectable (HumaLOG) 5 three times a day before meals  mycophenolate mofetil 1000 <User Schedule>  NIFEdipine XL 30 daily  oxyCODONE    IR 5 every 4 hours PRN  oxyCODONE    IR 10 every 4 hours PRN  pantoprazole  Injectable 40 every 12 hours  polyethylene glycol 3350 17 daily  QUEtiapine 25 at bedtime  senna 2 at bedtime  tacrolimus 3 <User Schedule>  tamsulosin 0.4 at bedtime    Vital Signs Last 24 Hrs  T(F): 97.7 (07-08-20 @ 11:00), Max: 99.5 (07-03-20 @ 04:00)  Vital Signs Last 24 Hrs  HR: 77 (07-08-20 @ 12:00) (73 - 81)  BP: 117/56 (07-08-20 @ 12:00) (101/53 - 136/67)  RR: 24 (07-08-20 @ 12:00)  SpO2: 93% (07-08-20 @ 12:00) (90% - 100%)  Wt(kg): --    EXAM:  Constitutional: Not in acute distress  Eyes: pupils bilaterally reactive to light. No icterus.  Oral cavity: Clear, no lesions  Neck: No neck vein distension noted  RS: Chest clear to auscultation bilaterally. No wheeze/rhonchi/crepitations.  CVS: S1, S2 heard. Regular rate and rhythm. No murmurs/rubs/gallops.  Abdomen: Soft. OLT Incision staple line is clear, without erythema or drainage. No guarding/rigidity/tenderness,   : No acute abnormalities  Extremities: Warm. No pedal edema, + bilateral bandage around legs  Skin: No lesions noted  Vascular: No evidence of phlebitis  Neuro: Alert, oriented to time/place/person                          10.4   4.70  )-----------( 68       ( 08 Jul 2020 00:19 )             31.3     07-08    131<L>  |  97  |  35<H>  ----------------------------<  256<H>  4.6   |  25  |  1.11    Ca    8.5      08 Jul 2020 00:19  Phos  3.0     07-08  Mg     2.0     07-08    TPro  4.7<L>  /  Alb  2.9<L>  /  TBili  1.7<H>  /  DBili  0.8<H>  /  AST  41<H>  /  ALT  123<H>  /  AlkPhos  117  07-08    MICROBIOLOGY:  No growth to date. (07-03 @ 11:00)  Culture Results:   No growth to date. (07-03 @ 11:00)  Culture Results:   No growth (07-03 @ 10:11)  Culture Results:   Testing in progress (07-03 @ 01:05)  Culture Results:   No growth (07-03 @ 01:05)    CMV IgG Antibody: <0.20 U/mL (12-04-19 @ 08:33)  Toxoplasma IgG Screen: <3.0 IU/mL (12-04-19 @ 08:33)    RADIOLOGY:    <The imaging below has been reviewed and visualized by me independently. Findings as detailed in report below>    06/18/20:  MRI right ankle with and without contrast: Evaluation for soft tissue infection is limited without intravenous contrast. Although there is heterogeneous marrow signal there is no skin ulceration, sinus tract or abscess identified with adjacent marrow signal abnormality suggest osteomyelitis.  There is edema within the abductor hallucis and flexor digitorum brevis muscles. There is minimal edema and marked atrophy within the adductor digit minimi muscle. These findings are likely related to denervation edema. The tendons at the ankle and hindfoot are intact. There is mild flexor hallucis longus tenosynovitis. There is mild spurring at the Achilles insertion. There is marked thickening at the middle cord of the plantar fascia which contains a small ossicle which is chronic. There is also focal thickening at the proximal lateral cortex of the plantar fascia likely related to old injury.   There is marked spurring dorsally at the second tarsometatarsal joint. There is edema within the sinus Tarsi with scarring.   The Lisfranc ligament is at the edge of the field although the interosseous ligament is grossly intact. The dorsal component is markedly thickened and chronically degenerated or torn. The distal syndesmotic ligaments are intact. The lateral collateral ligaments are intact. There is thickening at the deltoid ligament which is continuous.  Impression: Evaluation for soft tissue infection is limited without intravenous contrast. Heterogeneous marrow signal without an adjacent skin ulceration or fluid collection to suggest osteomyelitis.

## 2020-07-08 NOTE — PROGRESS NOTE ADULT - ATTENDING COMMENTS
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 s/p treatment course for possible VRE UTI. Now s/p OLT.     Had pancytopenia and required Filgrastim on 6/30  s/p OLT on 7/2/20    Noted to have some drainage from right heel so danilo-op Dapto and Lachelle were extended  No evidence of skin/soft tissue infection on exam today  No drainage, erythema or underlying fluctuance was appreciated at the right heel  MRI prior to transplant without obvious infection  Favor monitoring off of antibiotics at this time.     I will continue to follow. Please feel free to contact me with any further questions.    Eusebio Pérez M.D.  [facility] Division of Infectious Disease  8AM-5PM: Pager Number [phone]  After Hours (or if no response): Please contact the Infectious Diseases Office at [phone]     The above assessment and plan were discussed with Xiao, Transplant Surgery NP and Dr Letitia Mo

## 2020-07-08 NOTE — PROGRESS NOTE ADULT - SUBJECTIVE AND OBJECTIVE BOX
Chief Complaint:  Patient is a 64y old  Male who presents with a chief complaint of Liver failure, hepatic encephalopathy (2020 00:03)      Interval Events: Agitated climbing out of bed overnight, now on 1:1. Still having visual hallucninations. Denies n/v. Pt states he wants to leave ICU.    Allergies:  codeine (Anaphylaxis)      Hospital Medications:  aspirin enteric coated 81 milliGRAM(s) Oral daily  chlorhexidine 2% Cloths 1 Application(s) Topical <User Schedule>  collagenase Ointment 1 Application(s) Topical daily  insulin glargine Injectable (LANTUS) 22 Unit(s) SubCutaneous at bedtime  insulin lispro (HumaLOG) corrective regimen sliding scale   SubCutaneous three times a day before meals  insulin lispro (HumaLOG) corrective regimen sliding scale   SubCutaneous at bedtime  insulin lispro Injectable (HumaLOG) 10 Unit(s) SubCutaneous three times a day before meals  magnesium oxide 400 milliGRAM(s) Oral two times a day with meals  mycophenolate mofetil 1000 milliGRAM(s) Oral <User Schedule>  NIFEdipine XL 30 milliGRAM(s) Oral daily  nystatin    Suspension 233629 Unit(s) Swish and Swallow four times a day  nystatin Cream 1 Application(s) Topical two times a day  oxyCODONE    IR 5 milliGRAM(s) Oral every 4 hours PRN  oxyCODONE    IR 10 milliGRAM(s) Oral every 4 hours PRN  pantoprazole  Injectable 40 milliGRAM(s) IV Push every 12 hours  polyethylene glycol 3350 17 Gram(s) Oral daily  senna 2 Tablet(s) Oral at bedtime  tacrolimus 3 milliGRAM(s) Oral once  tacrolimus 3 milliGRAM(s) Oral <User Schedule>  tamsulosin 0.4 milliGRAM(s) Oral at bedtime  trimethoprim   80 mG/sulfamethoxazole 400 mG 1 Tablet(s) Oral daily  valGANciclovir 450 milliGRAM(s) Oral daily      PMHX/PSHX:  GIB (gastrointestinal bleeding)  GERD with esophagitis  Hepatic encephalopathy  Obesity  Fatty liver disease, nonalcoholic  Renal stones  Hypertension  Neuropathy  Hypercholesteremia  Diabetes  S/P cholecystectomy  No significant past surgical history      Family history:  Family history of type 2 diabetes mellitus  Family history of hypertension  Family history of stomach cancer  No pertinent family history in first degree relatives      ROS:     General:  No wt loss, fevers, chills, night sweats, fatigue,   Eyes:  Good vision, no reported pain  ENT:  No sore throat, pain, runny nose, dysphagia  CV:  No pain, palpitations, hypo/hypertension  Resp:  No dyspnea, cough, tachypnea, wheezing  GI:  See HPI  :  No pain, bleeding, incontinence, nocturia  Muscle:  No pain, weakness  Neuro:  No weakness, tingling, memory problems  Psych:  As per HPI. No fatigue, insomnia, mood problems, depression  Endocrine:  No polyuria, polydipsia, cold/heat intolerance  Heme:  No petechiae, ecchymosis, easy bruisability  Skin:  No rash, edema, foot wound      PHYSICAL EXAM:     Vital Signs:  Vital Signs Last 24 Hrs  T(C): 36.2 (2020 07:00), Max: 36.8 (2020 20:00)  T(F): 97.2 (2020 07:00), Max: 98.2 (2020 20:00)  HR: 80 (2020 09:00) (73 - 81)  BP: 101/53 (2020 09:00) (101/53 - 136/67)  BP(mean): 73 (2020 09:00) (73 - 95)  RR: 30 (2020 09:00) (12 - 30)  SpO2: 97% (2020 09:00) (90% - 100%)  Daily     Daily Weight in k.9 (2020 00:34)    GENERAL: no acute distress  NEURO: alert, AAOx2 (doesn't know which hospital he is in), +tremor  HEENT: anicteric sclera, no conjunctival pallor appreciated  CHEST: no respiratory distress, no accessory muscle use  CARDIAC: regular rate, rhythm  ABDOMEN: soft, incisions intact, ANDREINA drains in place w/ serosanguinous drainage, T-tube w/o drainage  EXTREMITIES: warm, well perfused, trace edema  SKIN: R heel with small open wound    LABS:                        10.4   4.70  )-----------( 68       ( 2020 00:19 )             31.3     07-08    131<L>  |  97  |  35<H>  ----------------------------<  256<H>  4.6   |  25  |  1.11    Ca    8.5      2020 00:19  Phos  3.0     07-08  Mg     2.0     07-08    TPro  4.7<L>  /  Alb  2.9<L>  /  TBili  1.7<H>  /  DBili  0.8<H>  /  AST  41<H>  /  ALT  123<H>  /  AlkPhos  117  07-08    LIVER FUNCTIONS - ( 2020 00:19 )  Alb: 2.9 g/dL / Pro: 4.7 g/dL / ALK PHOS: 117 U/L / ALT: 123 U/L / AST: 41 U/L / GGT: x           PT/INR - ( 2020 00:19 )   PT: 12.7 sec;   INR: 1.12 ratio         PTT - ( 2020 00:19 )  PTT:26.6 sec        Imaging: Chief Complaint:  Patient is a 64y old  Male who presents with a chief complaint of Liver failure, hepatic encephalopathy (2020 00:03)      Interval Events: Agitated climbing out of bed overnight, now on 1:1. Still having visual hallucninations. Denies n/v. Pt states he wants to leave ICU.    Allergies:  codeine (Anaphylaxis)    Hospital Medications:  aspirin enteric coated 81 milliGRAM(s) Oral daily  chlorhexidine 2% Cloths 1 Application(s) Topical <User Schedule>  collagenase Ointment 1 Application(s) Topical daily  insulin glargine Injectable (LANTUS) 22 Unit(s) SubCutaneous at bedtime  insulin lispro (HumaLOG) corrective regimen sliding scale   SubCutaneous three times a day before meals  insulin lispro (HumaLOG) corrective regimen sliding scale   SubCutaneous at bedtime  insulin lispro Injectable (HumaLOG) 10 Unit(s) SubCutaneous three times a day before meals  magnesium oxide 400 milliGRAM(s) Oral two times a day with meals  mycophenolate mofetil 1000 milliGRAM(s) Oral <User Schedule>  NIFEdipine XL 30 milliGRAM(s) Oral daily  nystatin    Suspension 355235 Unit(s) Swish and Swallow four times a day  nystatin Cream 1 Application(s) Topical two times a day  oxyCODONE    IR 5 milliGRAM(s) Oral every 4 hours PRN  oxyCODONE    IR 10 milliGRAM(s) Oral every 4 hours PRN  pantoprazole  Injectable 40 milliGRAM(s) IV Push every 12 hours  polyethylene glycol 3350 17 Gram(s) Oral daily  senna 2 Tablet(s) Oral at bedtime  tacrolimus 3 milliGRAM(s) Oral once  tacrolimus 3 milliGRAM(s) Oral <User Schedule>  tamsulosin 0.4 milliGRAM(s) Oral at bedtime  trimethoprim   80 mG/sulfamethoxazole 400 mG 1 Tablet(s) Oral daily  valGANciclovir 450 milliGRAM(s) Oral daily      PMHX/PSHX:  GIB (gastrointestinal bleeding)  GERD with esophagitis  Hepatic encephalopathy  Obesity  Fatty liver disease, nonalcoholic  Renal stones  Hypertension  Neuropathy  Hypercholesteremia  Diabetes  S/P cholecystectomy  No significant past surgical history      Family history:  Family history of type 2 diabetes mellitus  Family history of hypertension  Family history of stomach cancer  No pertinent family history in first degree relatives      ROS:     General:  No wt loss, fevers, chills, night sweats, fatigue,   Eyes:  Good vision, no reported pain  ENT:  No sore throat, pain, runny nose, dysphagia  CV:  No pain, palpitations, hypo/hypertension  Resp:  No dyspnea, cough, tachypnea, wheezing  GI:  See HPI  :  No pain, bleeding, incontinence, nocturia  Muscle:  No pain, weakness  Neuro:  No weakness, tingling, memory problems  Psych:  As per HPI. No fatigue, insomnia, mood problems, depression  Endocrine:  No polyuria, polydipsia, cold/heat intolerance  Heme:  No petechiae, ecchymosis, easy bruisability  Skin:  No rash, edema, foot wound      PHYSICAL EXAM:     Vital Signs:  Vital Signs Last 24 Hrs  T(C): 36.2 (2020 07:00), Max: 36.8 (2020 20:00)  T(F): 97.2 (2020 07:00), Max: 98.2 (2020 20:00)  HR: 80 (2020 09:00) (73 - 81)  BP: 101/53 (2020 09:00) (101/53 - 136/67)  BP(mean): 73 (2020 09:00) (73 - 95)  RR: 30 (2020 09:00) (12 - 30)  SpO2: 97% (2020 09:00) (90% - 100%)  Daily     Daily Weight in k.9 (2020 00:34)    GENERAL: no acute distress  NEURO: alert, AAOx2 (doesn't know which hospital he is in), +tremor  HEENT: anicteric sclera, no conjunctival pallor appreciated  CHEST: no respiratory distress, no accessory muscle use  CARDIAC: regular rate, rhythm  ABDOMEN: soft, incisions intact, ANDREINA drains in place w/ serosanguinous drainage, T-tube w/o drainage  EXTREMITIES: warm, well perfused, trace edema  SKIN: R heel with small open wound    LABS:                        10.4   4.70  )-----------( 68       ( 2020 00:19 )             31.3     07-08    131<L>  |  97  |  35<H>  ----------------------------<  256<H>  4.6   |  25  |  1.11    Ca    8.5      2020 00:19  Phos  3.0     07-08  Mg     2.0     07-08    TPro  4.7<L>  /  Alb  2.9<L>  /  TBili  1.7<H>  /  DBili  0.8<H>  /  AST  41<H>  /  ALT  123<H>  /  AlkPhos  117  07-08    LIVER FUNCTIONS - ( 2020 00:19 )  Alb: 2.9 g/dL / Pro: 4.7 g/dL / ALK PHOS: 117 U/L / ALT: 123 U/L / AST: 41 U/L / GGT: x           PT/INR - ( 2020 00:19 )   PT: 12.7 sec;   INR: 1.12 ratio         PTT - ( 2020 00:19 )  PTT:26.6 sec        Imaging:

## 2020-07-08 NOTE — PROGRESS NOTE ADULT - ASSESSMENT
64 M hx of DM, HLD, GERD, HFpEF with mild LV diastolic dysfunction, and decompensated JEAN cirrhosis c/b ascites with hx of SBP, HE, duodenal ulcer, GAVE and duodenal AVM s/p APC (last on 10/11/19), UNOS listed for liver transplantation at [facility]. 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 He is now re-admitted with hepatic encephalopathy and VRE UTI, also with MISA on CKD. S/p liver transplant 7/2.    Impression:  #S/p liver transplant 7/2: live enzymes continue to downtrend  OR details:  - L groin cutdown for vv bypass   - anastomosis: bicaval end-end/CBD duct-duct/HA & PV end-end, all standard anatomy.    - IOC with good flow  - Simulect induction. 500mg Solumedrol  - JPs x3 with T-Tube  - 14U pRBC, 14U FFP, 10 PLT, 11 CRYO, 500mL returned from cell saver, EBL 5L, UOP 1100mL  Recipient Info:   ABO: A  CMV:  Neg  EBV Positive  Donor:  Donor ID:  [id number] Match: [id number]  Age: 29 ABO:  A1 High Risk:   No COD: Cardiovascular  Anti CMV positive, EBV IgG- positive  HepBcAb-neg, Hepatitis C-DANA- neg, Hepatitis C ab-neg  #Tremors – improved, c/f taco toxicity +/- prednisone and opioid effects. received dose of tacro, level repeat pending. May need medication change based on clinical course  #Halluciniations/agitation: c/f prednisone toxicity with visual hallucinations   #GI bleed - pretransplant with acute blood loss anemia s/p 10U PRBCs, bleeding from known GAVE, Hb stable post-transplant, but reported melena 7/5 AM. Hgb now stable. GAVE and PHG should have improved w/ liver transplant, most likely etiology is NG tube trauma prior if any c/f GI bleed  # Hypertension on nifedipine now  # Lower ext edema: diuresing, on lasix  #R posterior heel wound w/ overlying eschar – on meropenem for 1 more day. Local wound care. no signs of infections, XR neg for gas, evaluated by podiatry and ID. MRI w/o contrast limited, but no overt signs of active infection.  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  # Hyperglycemia    Recommendation:  - remove drain/lines as able (per surgery/SICU)  - additional oral lasix 40mg today  - monitor renal function  - c/w cellcept, methylpred taper with monitoring mental status  - c/w tacro for now adjusted by level, may need to switch to CsA if neurotoxicity worsens  - glycemic control with insulin  - s/p course of meropenem  - c/w nystatin, Bactrim, valcyte ppx  - trend Hb- PO PPI BID, monitor bowel movements  - aspirin per surgery  - advance diet, low Na diet  - wound care/nursing for R heel ulcer  - continue with 1:1 for agitation precaution  - f/u transplant surgery recs  - rest of care per SICU team  - transplant hepatology will follow

## 2020-07-08 NOTE — PROGRESS NOTE ADULT - ATTENDING COMMENTS
remained delirius overnight, received melatonin  this AM more agitated, received haldol + IV Mg  increased lantus overnight  ANDREINA 430, T tube no output    - seroquel after EKG for QTc confirmation  - on room air  - bp well controlled on nifedipine  - cont diet, + BM  - bob out, per transplant will do PO lasix today  - transplant immunosuppression per transplant  - liver tests all improving  - no VTE PPX per transplant, only mechanical prophylaxis    remains on 1 to 1 until improved mental status

## 2020-07-08 NOTE — PROGRESS NOTE ADULT - SUBJECTIVE AND OBJECTIVE BOX
INTERVAL HPI/OVERNIGHT EVENTS:    events noted  tremulous  had episodes of hallucinations  now on 1:1      Allergies    codeine (Anaphylaxis)    Intolerances    General:  No wt loss, fevers, chills, night sweats, fatigue,   Eyes:  Good vision, no reported pain  ENT:  No sore throat, pain, runny nose, dysphagia  CV:  No pain, palpitations, hypo/hypertension  Resp:  No dyspnea, cough, tachypnea, wheezing  GI:  No pain, No nausea, No vomiting, No diarrhea, No constipation, No weight loss, No fever, No pruritis, No rectal bleeding, No tarry stools, No dysphagia,  :  No pain, bleeding, incontinence, nocturia  Muscle:  No pain, weakness  Neuro:  No weakness, tingling, memory problems  Psych:  No fatigue, insomnia, mood problems, depression  Endocrine:  No polyuria, polydipsia, cold/heat intolerance  Heme:  No petechiae, ecchymosis, easy bruisability  Skin:  No rash, tattoos, scars, edema    PHYSICAL EXAM:   Vital Signs:  Vital Signs Last 24 Hrs  T(C): 36.7 (14 Jun 2020 07:53), Max: 36.7 (14 Jun 2020 07:53)  T(F): 98.1 (14 Jun 2020 07:53), Max: 98.1 (14 Jun 2020 07:53)  HR: 79 (14 Jun 2020 07:53) (75 - 80)  BP: 127/77 (14 Jun 2020 07:53) (108/53 - 133/68)  BP(mean): --  RR: 18 (14 Jun 2020 07:53) (17 - 18)  SpO2: 97% (14 Jun 2020 07:53) (97% - 100%)  Daily     Daily I&O's Summary    13 Jun 2020 07:01  -  14 Jun 2020 07:00  --------------------------------------------------------  IN: 350 mL / OUT: 250 mL / NET: 100 mL    14 Jun 2020 07:01  -  14 Jun 2020 12:30  --------------------------------------------------------  IN: 0 mL / OUT: 350 mL / NET: -350 mL        GENERAL:  no distress  HEENT:  NC/AT   ABDOMEN:  Soft, non-tender, non-distended, normoactive bowel sounds  EXTEREMITIES:  + edema  SKIN:  No rash/erythema/ecchymoses/petechiae/wounds/abscess/warm/dry  NEURO:  awake, alert, oriented, +asterixis      LABS:                        9.0    5.07  )-----------( 68       ( 14 Jun 2020 07:17 )             26.4     06-14    131<L>  |  100  |  27<H>  ----------------------------<  141<H>  5.1   |  20<L>  |  1.74<H>    Ca    10.5      14 Jun 2020 07:17    TPro  6.3  /  Alb  3.8  /  TBili  11.0<H>  /  DBili  x   /  AST  24  /  ALT  15  /  AlkPhos  133<H>  06-14    PT/INR - ( 14 Jun 2020 09:19 )   PT: 26.3 sec;   INR: 2.23 ratio         PTT - ( 14 Jun 2020 09:19 )  PTT:44.2 sec    amylase   lipase  RADIOLOGY & ADDITIONAL TESTS:

## 2020-07-08 NOTE — PROGRESS NOTE ADULT - ASSESSMENT
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    OLTx  - Agitated, hallucinating.  Start Seroquel 25mg qhs. Monitor mental status closely.  Continue 1:1 beside care for safety  - Good graft function, LFTs trending down, t bili down 1.7  - Tie off T Tube today. Monitor ANDREINA output  - Continue ASA 81mg daily  - Lasix 40mg PO x1 today  - ID: 7/3 BCx, UCx ngtd, OR Cx ngtd.  Meropenem DCd 7/7/20  - Continue Protonix 40mg IV bid    - Immunsuppression: Prograf 3mg BID, MMF 1/1, pred taper.  Simulect completed.  Daily Tacrolimus level. Keep mag >2.  Monitor for signs of neurotoxicity, tremors.   - PPX: valcyte/bactrim/nystatin  - Continue MgSo4 400mg BID  - Regular, carbohydrate restricted diet  - Pain control PRN  - DVT PPx: SCDs at all times      Hyperglycemia  - In setting of steroid taper  - Appreciate endocrinology recommendations  - Start Lantus and Humalog insulin wih SSI coverage  - Fingersticks ac and qhs    Hypertension  -Controlled  -Continue Nifedipine    -Heel ulcer  Continue local wound care w/ santyl and DSD as per podiatry    Continue ICU care

## 2020-07-08 NOTE — PROGRESS NOTE ADULT - ATTENDING COMMENTS
agree with above  good graft function  however remains confused: ICU/steroid effect or tacro neurotoxicity  will monitor  immunosuppression tacro, mmf and steroids

## 2020-07-09 LAB
ALBUMIN SERPL ELPH-MCNC: 2.8 G/DL — LOW (ref 3.3–5)
ALP SERPL-CCNC: 83 U/L — SIGNIFICANT CHANGE UP (ref 40–120)
ALT FLD-CCNC: 96 U/L — HIGH (ref 10–45)
AMMONIA BLD-MCNC: 60 UMOL/L — HIGH (ref 11–55)
ANION GAP SERPL CALC-SCNC: 6 MMOL/L — SIGNIFICANT CHANGE UP (ref 5–17)
APTT BLD: 27 SEC — LOW (ref 27.5–35.5)
AST SERPL-CCNC: 33 U/L — SIGNIFICANT CHANGE UP (ref 10–40)
BILIRUB DIRECT SERPL-MCNC: 0.8 MG/DL — HIGH (ref 0–0.2)
BILIRUB INDIRECT FLD-MCNC: 0.8 MG/DL — SIGNIFICANT CHANGE UP (ref 0.2–1)
BILIRUB SERPL-MCNC: 1.6 MG/DL — HIGH (ref 0.2–1.2)
BUN SERPL-MCNC: 32 MG/DL — HIGH (ref 7–23)
CALCIUM SERPL-MCNC: 8.6 MG/DL — SIGNIFICANT CHANGE UP (ref 8.4–10.5)
CHLORIDE SERPL-SCNC: 102 MMOL/L — SIGNIFICANT CHANGE UP (ref 96–108)
CO2 SERPL-SCNC: 28 MMOL/L — SIGNIFICANT CHANGE UP (ref 22–31)
CREAT SERPL-MCNC: 1.04 MG/DL — SIGNIFICANT CHANGE UP (ref 0.5–1.3)
GLUCOSE BLDC GLUCOMTR-MCNC: 133 MG/DL — HIGH (ref 70–99)
GLUCOSE BLDC GLUCOMTR-MCNC: 170 MG/DL — HIGH (ref 70–99)
GLUCOSE BLDC GLUCOMTR-MCNC: 184 MG/DL — HIGH (ref 70–99)
GLUCOSE BLDC GLUCOMTR-MCNC: 187 MG/DL — HIGH (ref 70–99)
GLUCOSE BLDC GLUCOMTR-MCNC: 189 MG/DL — HIGH (ref 70–99)
GLUCOSE SERPL-MCNC: 134 MG/DL — HIGH (ref 70–99)
HCT VFR BLD CALC: 32 % — LOW (ref 39–50)
HGB BLD-MCNC: 10.5 G/DL — LOW (ref 13–17)
INR BLD: 1.16 RATIO — SIGNIFICANT CHANGE UP (ref 0.88–1.16)
MAGNESIUM SERPL-MCNC: 2 MG/DL — SIGNIFICANT CHANGE UP (ref 1.6–2.6)
MCHC RBC-ENTMCNC: 31.3 PG — SIGNIFICANT CHANGE UP (ref 27–34)
MCHC RBC-ENTMCNC: 32.8 GM/DL — SIGNIFICANT CHANGE UP (ref 32–36)
MCV RBC AUTO: 95.5 FL — SIGNIFICANT CHANGE UP (ref 80–100)
NRBC # BLD: 0 /100 WBCS — SIGNIFICANT CHANGE UP (ref 0–0)
PHOSPHATE SERPL-MCNC: 2.9 MG/DL — SIGNIFICANT CHANGE UP (ref 2.5–4.5)
PLATELET # BLD AUTO: 66 K/UL — LOW (ref 150–400)
POTASSIUM SERPL-MCNC: 4.3 MMOL/L — SIGNIFICANT CHANGE UP (ref 3.5–5.3)
POTASSIUM SERPL-SCNC: 4.3 MMOL/L — SIGNIFICANT CHANGE UP (ref 3.5–5.3)
PROCALCITONIN SERPL-MCNC: 1.1 NG/ML — HIGH (ref 0.02–0.1)
PROT SERPL-MCNC: 4.7 G/DL — LOW (ref 6–8.3)
PROTHROM AB SERPL-ACNC: 13.3 SEC — SIGNIFICANT CHANGE UP (ref 10.6–13.6)
RBC # BLD: 3.35 M/UL — LOW (ref 4.2–5.8)
RBC # FLD: 17.5 % — HIGH (ref 10.3–14.5)
SODIUM SERPL-SCNC: 136 MMOL/L — SIGNIFICANT CHANGE UP (ref 135–145)
TACROLIMUS SERPL-MCNC: 11.2 NG/ML — SIGNIFICANT CHANGE UP
WBC # BLD: 3.79 K/UL — LOW (ref 3.8–10.5)
WBC # FLD AUTO: 3.79 K/UL — LOW (ref 3.8–10.5)

## 2020-07-09 PROCEDURE — 71045 X-RAY EXAM CHEST 1 VIEW: CPT | Mod: 26

## 2020-07-09 PROCEDURE — 99232 SBSQ HOSP IP/OBS MODERATE 35: CPT | Mod: GC

## 2020-07-09 PROCEDURE — 99233 SBSQ HOSP IP/OBS HIGH 50: CPT | Mod: GC

## 2020-07-09 PROCEDURE — 99233 SBSQ HOSP IP/OBS HIGH 50: CPT

## 2020-07-09 PROCEDURE — 99232 SBSQ HOSP IP/OBS MODERATE 35: CPT

## 2020-07-09 RX ORDER — TACROLIMUS 5 MG/1
2.5 CAPSULE ORAL
Refills: 0 | Status: DISCONTINUED | OUTPATIENT
Start: 2020-07-09 | End: 2020-07-10

## 2020-07-09 RX ORDER — HALOPERIDOL DECANOATE 100 MG/ML
2.5 INJECTION INTRAMUSCULAR ONCE
Refills: 0 | Status: DISCONTINUED | OUTPATIENT
Start: 2020-07-09 | End: 2020-07-09

## 2020-07-09 RX ORDER — MYCOPHENOLATE MOFETIL 250 MG/1
500 CAPSULE ORAL
Refills: 0 | Status: DISCONTINUED | OUTPATIENT
Start: 2020-07-09 | End: 2020-07-10

## 2020-07-09 RX ORDER — MEROPENEM 1 G/30ML
1000 INJECTION INTRAVENOUS EVERY 8 HOURS
Refills: 0 | Status: COMPLETED | OUTPATIENT
Start: 2020-07-09 | End: 2020-07-16

## 2020-07-09 RX ORDER — INSULIN LISPRO 100/ML
VIAL (ML) SUBCUTANEOUS AT BEDTIME
Refills: 0 | Status: DISCONTINUED | OUTPATIENT
Start: 2020-07-09 | End: 2020-07-27

## 2020-07-09 RX ORDER — HALOPERIDOL DECANOATE 100 MG/ML
5 INJECTION INTRAMUSCULAR ONCE
Refills: 0 | Status: COMPLETED | OUTPATIENT
Start: 2020-07-09 | End: 2020-07-09

## 2020-07-09 RX ORDER — SODIUM CHLORIDE 9 MG/ML
5 INJECTION INTRAMUSCULAR; INTRAVENOUS; SUBCUTANEOUS ONCE
Refills: 0 | Status: COMPLETED | OUTPATIENT
Start: 2020-07-09 | End: 2020-07-10

## 2020-07-09 RX ORDER — FUROSEMIDE 40 MG
40 TABLET ORAL ONCE
Refills: 0 | Status: COMPLETED | OUTPATIENT
Start: 2020-07-09 | End: 2020-07-09

## 2020-07-09 RX ORDER — MEROPENEM 1 G/30ML
1000 INJECTION INTRAVENOUS EVERY 8 HOURS
Refills: 0 | Status: DISCONTINUED | OUTPATIENT
Start: 2020-07-09 | End: 2020-07-09

## 2020-07-09 RX ADMIN — Medication 100000 UNIT(S): at 06:00

## 2020-07-09 RX ADMIN — MYCOPHENOLATE MOFETIL 500 MILLIGRAM(S): 250 CAPSULE ORAL at 20:19

## 2020-07-09 RX ADMIN — VALGANCICLOVIR 450 MILLIGRAM(S): 450 TABLET, FILM COATED ORAL at 11:42

## 2020-07-09 RX ADMIN — Medication 5 UNIT(S): at 11:41

## 2020-07-09 RX ADMIN — PANTOPRAZOLE SODIUM 40 MILLIGRAM(S): 20 TABLET, DELAYED RELEASE ORAL at 06:00

## 2020-07-09 RX ADMIN — Medication 100000 UNIT(S): at 11:42

## 2020-07-09 RX ADMIN — Medication 100000 UNIT(S): at 00:00

## 2020-07-09 RX ADMIN — INSULIN GLARGINE 12 UNIT(S): 100 INJECTION, SOLUTION SUBCUTANEOUS at 22:17

## 2020-07-09 RX ADMIN — Medication 40 MILLIGRAM(S): at 08:27

## 2020-07-09 RX ADMIN — NYSTATIN CREAM 1 APPLICATION(S): 100000 CREAM TOPICAL at 17:16

## 2020-07-09 RX ADMIN — TACROLIMUS 3 MILLIGRAM(S): 5 CAPSULE ORAL at 08:23

## 2020-07-09 RX ADMIN — POLYETHYLENE GLYCOL 3350 17 GRAM(S): 17 POWDER, FOR SOLUTION ORAL at 11:42

## 2020-07-09 RX ADMIN — HALOPERIDOL DECANOATE 5 MILLIGRAM(S): 100 INJECTION INTRAMUSCULAR at 07:20

## 2020-07-09 RX ADMIN — TACROLIMUS 2.5 MILLIGRAM(S): 5 CAPSULE ORAL at 20:19

## 2020-07-09 RX ADMIN — MYCOPHENOLATE MOFETIL 1000 MILLIGRAM(S): 250 CAPSULE ORAL at 08:23

## 2020-07-09 RX ADMIN — Medication 81 MILLIGRAM(S): at 11:42

## 2020-07-09 RX ADMIN — Medication 100000 UNIT(S): at 17:16

## 2020-07-09 RX ADMIN — Medication 1 APPLICATION(S): at 11:43

## 2020-07-09 RX ADMIN — Medication 5 UNIT(S): at 17:16

## 2020-07-09 RX ADMIN — NYSTATIN CREAM 1 APPLICATION(S): 100000 CREAM TOPICAL at 06:00

## 2020-07-09 RX ADMIN — MEROPENEM 100 MILLIGRAM(S): 1 INJECTION INTRAVENOUS at 20:28

## 2020-07-09 RX ADMIN — PANTOPRAZOLE SODIUM 40 MILLIGRAM(S): 20 TABLET, DELAYED RELEASE ORAL at 17:16

## 2020-07-09 RX ADMIN — QUETIAPINE FUMARATE 25 MILLIGRAM(S): 200 TABLET, FILM COATED ORAL at 20:19

## 2020-07-09 RX ADMIN — Medication 1: at 11:41

## 2020-07-09 RX ADMIN — Medication 1: at 17:16

## 2020-07-09 RX ADMIN — Medication 1 TABLET(S): at 11:43

## 2020-07-09 RX ADMIN — Medication 30 MILLIGRAM(S): at 06:00

## 2020-07-09 RX ADMIN — CHLORHEXIDINE GLUCONATE 1 APPLICATION(S): 213 SOLUTION TOPICAL at 06:30

## 2020-07-09 RX ADMIN — TAMSULOSIN HYDROCHLORIDE 0.4 MILLIGRAM(S): 0.4 CAPSULE ORAL at 20:19

## 2020-07-09 NOTE — PROGRESS NOTE ADULT - ATTENDING COMMENTS
63 y/o M PMHx decompensated JEAN Cirrhosis on transplant list, DMII, HFpEF, recent admission for ESBL E coli prostatic abscess 2/2 4 wks ertapenem, hx of ESBL UTIs, recent VRE urinary colonization came to hospital for worsening jaundice and episode of confusion, resolved PTA. s/p treatment course for possible VRE UTI. Now s/p OLT.     Had pancytopenia and required Filgrastim on 6/30  s/p OLT on 7/2/20    Noted to have some drainage from right heel so danilo-op Dapto and Lachelle were extended  No evidence of skin/soft tissue infection on exam today  No drainage, erythema or underlying fluctuance was appreciated at the right heel  MRI prior to transplant without obvious infection    Now with worsening encephalopathy  Also with new hypoxia  CXR with RLL Infiltrate  ?Aspiration PNA  Would obtain blood cultures, MRSA Nasal PCR, U/A for complete workup, procalcitonin  Would cover with Meropenem for now     I will continue to follow. Please feel free to contact me with any further questions.    Eusebio Pérez M.D.  [facility] Division of Infectious Disease  8AM-5PM: Pager Number [phone]  After Hours (or if no response): Please contact the Infectious Diseases Office at [phone]    The above assessment and plan were discussed with Lavern, Transplant Surgery PA and Dr Letitia Mo

## 2020-07-09 NOTE — PROGRESS NOTE ADULT - SUBJECTIVE AND OBJECTIVE BOX
HISTORY  64y Male c/b small esophageal varices (12/2019), portal hypertensive gastropathy, ascites, hx SBP, hx hepatic encephalopathy, GAVE syndrome s/p APC, hx duodenal ulcer (5/2019), HTN, HLD, DM, HFpEF (EF 70%), recent ESBL E coli prostate abscess (3/2020 s/p 4 weeks ertapenem), orthostatic hypotension ( on midodrine) who was initially admitted to medicine for hepatic encephalopathy. Hospital course c/b MISA, VRE UTI, acute blood loss anemia, received blood transfusion, EGD done 6/22 with Grade II esophageal varices, acute gastritis with hemorrhage and acute duodenitis with hemorrhage. Patient had worsening mental status, concerning decompensating liver cirrhosis, hepatic encephalopathy patient then transferred to SICU for further care.      24 HOUR EVENTS:   - . Glucose still >200. Endo consult placed. Pt started on 16u lantus qhs and 10u premeal insulin. If BG remains elevated today will give 22lantus this evening.   - Meropenem for heel wound dc'ed.  - Tacro 3mg BID as per transplant  - Lasix 40POx1    SUBJECTIVE/ROS:  [ ] A ten-point review of systems was otherwise negative except as noted.  [ ] Due to altered mental status/intubation, subjective information were not able to be obtained from the patient. History was obtained, to the extent possible, from review of the chart and collateral sources of information.      NEURO  Exam: awake, alert, oriented  Meds: oxyCODONE    IR 5 milliGRAM(s) Oral every 4 hours PRN Moderate Pain (4 - 6)  oxyCODONE    IR 10 milliGRAM(s) Oral every 4 hours PRN Severe Pain (7 - 10)  QUEtiapine 25 milliGRAM(s) Oral at bedtime    [x] Adequacy of sedation and pain control has been assessed and adjusted      RESPIRATORY  RR: 14 (07-09-20 @ 01:00) (12 - 31)  SpO2: 100% (07-09-20 @ 01:00) (78% - 100%)  Exam: unlabored, clear to auscultation bilaterally  Mechanical Ventilation: none  [N/A] Extubation Readiness Assessed  Meds: none      CARDIOVASCULAR  HR: 69 (07-09-20 @ 01:00) (69 - 81)  BP: 136/68 (07-09-20 @ 01:00) (101/53 - 136/68)  BP(mean): 97 (07-09-20 @ 01:00) (73 - 97)  Exam: regular rate and rhythm  Cardiac Rhythm: sinus  Perfusion     [x]Adequate   [ ]Inadequate  Mentation   [x]Normal       [ ]Reduced  Extremities  [x]Warm         [ ]Cool  Volume Status [ ]Hypervolemic [x]Euvolemic [ ]Hypovolemic  Meds: NIFEdipine XL 30 milliGRAM(s) Oral daily  tamsulosin 0.4 milliGRAM(s) Oral at bedtime        GI/NUTRITION  Exam: soft, nontender, nondistended  Diet: consistent carb diet   Meds: pantoprazole  Injectable 40 milliGRAM(s) IV Push every 12 hours  polyethylene glycol 3350 17 Gram(s) Oral daily  senna 2 Tablet(s) Oral at bedtime      GENITOURINARY  I&O's Detail    07-07 @ 07:01  -  07-08 @ 07:00  --------------------------------------------------------  IN:    Oral Fluid: 120 mL    Solution: 50 mL    Solution: 250 mL  Total IN: 420 mL    OUT:    Drain: 340 mL    Incontinent per Condom Catheter: 2000 mL    Voided: 200 mL  Total OUT: 2540 mL    Total NET: -2120 mL      07-08 @ 07:01  -  07-09 @ 01:53  --------------------------------------------------------  IN:    Oral Fluid: 720 mL    Solution: 50 mL  Total IN: 770 mL    OUT:    Drain: 70 mL    Intermittent Catheterization - Urethral: 450 mL  Total OUT: 520 mL    Total NET: 250 mL          07-08    131<L>  |  97  |  35<H>  ----------------------------<  256<H>  4.6   |  25  |  1.11    Ca    8.5      08 Jul 2020 00:19  Phos  3.0     07-08  Mg     2.0     07-08    TPro  4.7<L>  /  Alb  2.9<L>  /  TBili  1.7<H>  /  DBili  0.8<H>  /  AST  41<H>  /  ALT  123<H>  /  AlkPhos  117  07-08    [ ] Beasley catheter, indication: N/A  Meds: dextrose 5%. 1000 milliLiter(s) IV Continuous <Continuous>  magnesium oxide 400 milliGRAM(s) Oral two times a day with meals        HEMATOLOGIC  Meds: aspirin enteric coated 81 milliGRAM(s) Oral daily    [x] VTE Prophylaxis                        10.4   4.70  )-----------( 68       ( 08 Jul 2020 00:19 )             31.3     PT/INR - ( 08 Jul 2020 00:19 )   PT: 12.7 sec;   INR: 1.12 ratio         PTT - ( 08 Jul 2020 00:19 )  PTT:26.6 sec  Transfusion     [ ] PRBC   [ ] Platelets   [ ] FFP   [ ] Cryoprecipitate      INFECTIOUS DISEASES    RECENT CULTURES:    Meds: mycophenolate mofetil 1000 milliGRAM(s) Oral <User Schedule>  nystatin    Suspension 247090 Unit(s) Swish and Swallow four times a day  tacrolimus 3 milliGRAM(s) Oral <User Schedule>  trimethoprim   80 mG/sulfamethoxazole 400 mG 1 Tablet(s) Oral daily  valGANciclovir 450 milliGRAM(s) Oral daily        ENDOCRINE  CAPILLARY BLOOD GLUCOSE      POCT Blood Glucose.: 132 mg/dL (08 Jul 2020 20:33)  POCT Blood Glucose.: 177 mg/dL (08 Jul 2020 17:21)  POCT Blood Glucose.: 113 mg/dL (08 Jul 2020 13:33)  POCT Blood Glucose.: 88 mg/dL (08 Jul 2020 12:32)  POCT Blood Glucose.: 64 mg/dL (08 Jul 2020 11:56)  POCT Blood Glucose.: 64 mg/dL (08 Jul 2020 11:55)  POCT Blood Glucose.: 188 mg/dL (08 Jul 2020 07:32)    Meds: dextrose 40% Gel 15 Gram(s) Oral once PRN  dextrose 50% Injectable 12.5 Gram(s) IV Push once  dextrose 50% Injectable 25 Gram(s) IV Push once  dextrose 50% Injectable 25 Gram(s) IV Push once  glucagon  Injectable 1 milliGRAM(s) IntraMuscular once PRN  insulin glargine Injectable (LANTUS) 12 Unit(s) SubCutaneous at bedtime  insulin lispro (HumaLOG) corrective regimen sliding scale   SubCutaneous three times a day before meals  insulin lispro (HumaLOG) corrective regimen sliding scale   SubCutaneous at bedtime  insulin lispro Injectable (HumaLOG) 5 Unit(s) SubCutaneous three times a day before meals  predniSONE   Tablet 5 milliGRAM(s) Oral daily        ACCESS DEVICES:  [x Peripheral IV  [ ] Central Venous Line	[ ] R	[ ] L	[ ] IJ	[ ] Fem	[ ] SC	Placed:   [ ] Arterial Line		[ ] R	[ ] L	[ ] Fem	[ ] Rad	[ ] Ax	Placed:   [ ] PICC:					[ ] Mediport  [ ] Urinary Catheter, Date Placed:   [x] Necessity of urinary, arterial, and venous catheters discussed    OTHER MEDICATIONS:  chlorhexidine 2% Cloths 1 Application(s) Topical <User Schedule>  collagenase Ointment 1 Application(s) Topical daily  nystatin Cream 1 Application(s) Topical two times a day      CODE STATUS: full code      IMAGING: < from: CT Pelvis No Cont (06.12.20 @ 17:37) >  PROCEDURE:   CT of the Pelvis was performed without intravenous contrast.   Intravenous contrast: None.  Oral contrast: None.  Sagittal and coronal reformats were performed.    FINDINGS:  BLADDER: Within normal limits.  REPRODUCTIVE ORGANS: The prostate is normal in size with a few foci of calcifications. No discrete fluid collection is seen.  LYMPH NODES: No pelvic lymphadenopathy.    VISUALIZED PORTIONS:  ABDOMINAL ORGANS: 0.6 cm no obstructive calculus in the lower pole of the left kidney is unchanged.  BOWEL: Moderate to large amount of stool and rectum and colon. No small bowel obstruction.  PERITONEUM: No ascites.  VESSELS: Arterial calcifications.  ABDOMINAL WALL: Within normal limits.  BONES: Degenerative changes.    IMPRESSION:     Evaluation without intravenous contrast. No prostate abscess noted within the limitations of the exam.          < end of copied text >

## 2020-07-09 NOTE — PROGRESS NOTE ADULT - SUBJECTIVE AND OBJECTIVE BOX
Diabetes Follow up note:    Chief complaint: T2DM    Interval Hx: BG values improved on adjusted insulin regimen. Pt remains on 1:1 for agitation/confusion. Tolerating POs today at breakfast and lunch per PCA/pt. Premeal insulin held this AM, pre-lunch glucose up to 180s.    Review of Systems:  General: no complaints.   GI: Tolerating POs. Denies N/V/D/Abd pain  CV: Denies CP/SOB  ENDO: No S&Sx of hypoglycemia  MEDS:    insulin glargine Injectable (LANTUS) 12 Unit(s) SubCutaneous at bedtime  insulin lispro (HumaLOG) corrective regimen sliding scale   SubCutaneous three times a day before meals  insulin lispro (HumaLOG) corrective regimen sliding scale   SubCutaneous at bedtime  insulin lispro Injectable (HumaLOG) 5 Unit(s) SubCutaneous three times a day before meals  predniSONE   Tablet 5 milliGRAM(s) Oral daily    nystatin    Suspension 598022 Unit(s) Swish and Swallow four times a day  trimethoprim   80 mG/sulfamethoxazole 400 mG 1 Tablet(s) Oral daily  valGANciclovir 450 milliGRAM(s) Oral daily    Allergies    codeine (Anaphylaxis)        PE:  General: Male lying in bed. NAD.   Vital Signs Last 24 Hrs  T(C): 36.8 (09 Jul 2020 11:00), Max: 36.9 (09 Jul 2020 03:00)  T(F): 98.2 (09 Jul 2020 11:00), Max: 98.4 (09 Jul 2020 03:00)  HR: 79 (09 Jul 2020 13:00) (69 - 80)  BP: 116/85 (09 Jul 2020 13:00) (89/51 - 153/80)  BP(mean): 95 (09 Jul 2020 13:00) (66 - 101)  RR: 29 (09 Jul 2020 13:00) (14 - 32)  SpO2: 97% (09 Jul 2020 13:00) (78% - 100%)  Abd: Soft, NT,ND, Incision intact. JPs in place.   Extremities: Warm. Trace edema.   Neuro: A&O X2    LABS:  POCT Blood Glucose.: 184 mg/dL (07-09-20 @ 11:32)  POCT Blood Glucose.: 133 mg/dL (07-09-20 @ 07:18)  POCT Blood Glucose.: 132 mg/dL (07-08-20 @ 20:33)  POCT Blood Glucose.: 177 mg/dL (07-08-20 @ 17:21)  POCT Blood Glucose.: 113 mg/dL (07-08-20 @ 13:33)  POCT Blood Glucose.: 88 mg/dL (07-08-20 @ 12:32)  POCT Blood Glucose.: 64 mg/dL (07-08-20 @ 11:56)  POCT Blood Glucose.: 64 mg/dL (07-08-20 @ 11:55)  POCT Blood Glucose.: 188 mg/dL (07-08-20 @ 07:32)  POCT Blood Glucose.: 221 mg/dL (07-07-20 @ 22:00)  POCT Blood Glucose.: 235 mg/dL (07-07-20 @ 16:24)  POCT Blood Glucose.: 205 mg/dL (07-07-20 @ 11:21)  POCT Blood Glucose.: 104 mg/dL (07-07-20 @ 08:20)  POCT Blood Glucose.: 104 mg/dL (07-07-20 @ 06:49)  POCT Blood Glucose.: 124 mg/dL (07-07-20 @ 06:11)  POCT Blood Glucose.: 140 mg/dL (07-07-20 @ 05:02)  POCT Blood Glucose.: 150 mg/dL (07-07-20 @ 03:59)  POCT Blood Glucose.: 176 mg/dL (07-07-20 @ 02:55)  POCT Blood Glucose.: 217 mg/dL (07-07-20 @ 01:54)  POCT Blood Glucose.: 274 mg/dL (07-07-20 @ 00:50)  POCT Blood Glucose.: 331 mg/dL (07-06-20 @ 23:54)  POCT Blood Glucose.: 317 mg/dL (07-06-20 @ 23:08)  POCT Blood Glucose.: 320 mg/dL (07-06-20 @ 21:25)  POCT Blood Glucose.: 232 mg/dL (07-06-20 @ 16:41)                            10.5   3.79  )-----------( 66 ( 09 Jul 2020 04:37 )             32.0       07-09    136  |  102  |  32<H>  ----------------------------<  134<H>  4.3   |  28  |  1.04    Ca    8.6      09 Jul 2020 04:37  Phos  2.9     07-09  Mg     2.0     07-09    TPro  4.7<L>  /  Alb  2.8<L>  /  TBili  1.6<H>  /  DBili  0.8<H>  /  AST  33  /  ALT  96<H>  /  AlkPhos  83  07-09        A1C with Estimated Average Glucose Result: 5.2 % (06-14-20 @ 11:00)  A1C with Estimated Average Glucose Result: 5.4 % (06-12-20 @ 08:26)  A1C with Estimated Average Glucose Result: 4.8 % (04-26-20 @ 09:39)  Hemoglobin A1C, Whole Blood: 6.4 % (02-12-20 @ 17:08)  Hemoglobin A1C, Whole Blood: 5.8 % (12-04-19 @ 08:38)          Contact number: kit [phone] or [phone]

## 2020-07-09 NOTE — PROGRESS NOTE ADULT - ATTENDING COMMENTS
65 yo M with HLD, IDDM, GERD, HFpEF with mild LV diastolic dysfunction, chronic right foot ulcer, recurrent UTIs, and decompensated JEAN cirrhosis, s/p OLT 7/2 (standard criteria donor, standard vascular anastomoses with duct-to-duct biliary anastomosis over T-tube, CMV D+/R-, EBV D+/R+, received Simulect induction).    Hepatic allograft is stable. Still having delirium with intermittent visual hallucinations, consistent with steroid psychosis though may also have ICU delirium vs tacrolimus neurotoxicity. Continuing frequent re-orientation and calming interventions, to try to minimize his need for chemical sedation with antipsychotic medications. Prednisone decreased to 5 mg po daily today. Continuing MMF 1000 mg po bid and tacrolimus dosed by trough level (with goal 8-10 ng/mL). Will re-address the possibility of switching from tacrolimus to cyclosporine if mental status not improving despite being on only low dose steroids. Continuing prophylaxis with nystatin, Valcyte, and Bactrim.    Chronic right foot ulcer does not appear infected. Being monitored off antibiotics. Continuing wound care.  Renal function is stable, and continuing with diuresis with daily furosemide.  Daily PT for debility.    Please don't hesitate to call with any questions/concerns.    Letitia Mo M.D., Ph.D.  Transplant Hepatology  Cell: [phone]

## 2020-07-09 NOTE — PROGRESS NOTE ADULT - SUBJECTIVE AND OBJECTIVE BOX
Patient is a 64y old  Male who presents with a chief complaint of Liver failure, hepatic encephalopathy (08 Jul 2020 12:37)    HPI:  Interval History/ROS: Patient is a 64 year old male with past medical history of insulin dependent diabtes mellitus, hyperlipidemia, obesity, heart failure with preserved ejection fraction with mild LV diastolic dysfunction, MGUS, chronic anemia, duodenal ulcer, GAVE,  duodenal AVM s/p APC (last on 10/11/19) who presented with 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 The patient was re-admitted with hepatic encephalopathy and VRE UTI, also with MISA on CKD. S/p liver transplant 7/2.     Today, he remains comfortable in no acute distress. Appears confused. Had no acute events overnight.     REVIEW OF SYSTEMS  [  ] ROS unobtainable because:    [ x] All other systems negative except as noted below:	    Constitutional:  [ ] fever [ ] chills  [ ] weight loss  [ ] weakness  Skin:  [ ] rash [ ] phlebitis	  Eyes: [ ] icterus [ ] pain  [ ] discharge	  ENMT: [ ] sore throat  [ ] thrush [ ] ulcers [ ] exudates  Respiratory: [ ] dyspnea [ ] hemoptysis [ ] cough [ ] sputum	  Cardiovascular:  [ ] chest pain [ ] palpitations [ ] edema	  Gastrointestinal:  [ ] nausea [ ] vomiting [ ] diarrhea [ ] constipation [ ] pain	  Genitourinary:  [ ] dysuria [ ] frequency [ ] hematuria [ ] discharge [ ] flank pain  [ ] incontinence  Musculoskeletal:  [ ] myalgias [ ] arthralgias [ ] arthritis  [ ] back pain  Neurological:  [ ] headache [ ] seizures  [x] confusion/altered mental status  Psychiatric:  [ ] anxiety [ ] depression	  Hematology/Lymphatics:  [ ] lymphadenopathy  Endocrine:  [ ] adrenal [ ] thyroid  Allergic/Immunologic:	 [ ] transplant [ ] seasonal        PAST MEDICAL & SURGICAL HISTORY:  GIB (gastrointestinal bleeding)  GERD with esophagitis: Gastritis &amp; Non Bleeding Ulcers  Hepatic encephalopathy  Obesity  Fatty liver disease, nonalcoholic  Renal stones: 25 years ago  Hypertension  Neuropathy  Hypercholesteremia  Diabetes  S/P cholecystectomy    Allergies  codeine (Anaphylaxis)    ANTIMICROBIALS:    nystatin    Suspension [id number] four times a day  trimethoprim   80 mG/sulfamethoxazole 400 mG 1 daily  valGANciclovir 450 daily    OTHER MEDS: MEDICATIONS  (STANDING):  aspirin enteric coated 81 daily  dextrose 40% Gel 15 once PRN  dextrose 50% Injectable 12.5 once  dextrose 50% Injectable 25 once  dextrose 50% Injectable 25 once  glucagon  Injectable 1 once PRN  insulin glargine Injectable (LANTUS) 12 at bedtime  insulin lispro (HumaLOG) corrective regimen sliding scale  three times a day before meals  insulin lispro (HumaLOG) corrective regimen sliding scale  at bedtime  insulin lispro Injectable (HumaLOG) 5 three times a day before meals  mycophenolate mofetil 1000 <User Schedule>  NIFEdipine XL 30 daily  oxyCODONE    IR 5 every 4 hours PRN  oxyCODONE    IR 10 every 4 hours PRN  pantoprazole  Injectable 40 every 12 hours  polyethylene glycol 3350 17 daily  QUEtiapine 25 at bedtime  senna 2 at bedtime  tacrolimus 3 <User Schedule>  tamsulosin 0.4 at bedtime    Vital Signs Last 24 Hrs  T(F): 97.7 (07-08-20 @ 11:00), Max: 99.5 (07-03-20 @ 04:00)  Vital Signs Last 24 Hrs  HR: 77 (07-08-20 @ 12:00) (73 - 81)  BP: 117/56 (07-08-20 @ 12:00) (101/53 - 136/67)  RR: 24 (07-08-20 @ 12:00)  SpO2: 93% (07-08-20 @ 12:00) (90% - 100%)  Wt(kg): --    EXAM:  Constitutional: Not in acute distress  Eyes: pupils bilaterally reactive to light. No icterus.  Oral cavity: Clear, no lesions  Neck: No neck vein distension noted  RS: Chest clear to auscultation bilaterally. No wheeze/rhonchi/crepitations.  CVS: S1, S2 heard. Regular rate and rhythm. No murmurs/rubs/gallops.  Abdomen: Soft. OLT Incision staple line is clear, without erythema or drainage. No guarding/rigidity/tenderness,   : No acute abnormalities  Extremities: Warm. No pedal edema, +without drainage or purulence in right heel   Skin: No lesions noted  Vascular: No evidence of phlebitis  Neuro: Alert, oriented to time/place/person                          10.4   4.70  )-----------( 68       ( 08 Jul 2020 00:19 )             31.3     07-08    131<L>  |  97  |  35<H>  ----------------------------<  256<H>  4.6   |  25  |  1.11    Ca    8.5      08 Jul 2020 00:19  Phos  3.0     07-08  Mg     2.0     07-08    TPro  4.7<L>  /  Alb  2.9<L>  /  TBili  1.7<H>  /  DBili  0.8<H>  /  AST  41<H>  /  ALT  123<H>  /  AlkPhos  117  07-08    MICROBIOLOGY:  No growth to date. (07-03 @ 11:00)  Culture Results:   No growth to date. (07-03 @ 11:00)  Culture Results:   No growth (07-03 @ 10:11)  Culture Results:   Testing in progress (07-03 @ 01:05)  Culture Results:   No growth (07-03 @ 01:05)    CMV IgG Antibody: <0.20 U/mL (12-04-19 @ 08:33)  Toxoplasma IgG Screen: <3.0 IU/mL (12-04-19 @ 08:33)    RADIOLOGY:    <The imaging below has been reviewed and visualized by me independently. Findings as detailed in report below>    06/18/20:  MRI right ankle with and without contrast: Evaluation for soft tissue infection is limited without intravenous contrast. Although there is heterogeneous marrow signal there is no skin ulceration, sinus tract or abscess identified with adjacent marrow signal abnormality suggest osteomyelitis.  There is edema within the abductor hallucis and flexor digitorum brevis muscles. There is minimal edema and marked atrophy within the adductor digit minimi muscle. These findings are likely related to denervation edema. The tendons at the ankle and hindfoot are intact. There is mild flexor hallucis longus tenosynovitis. There is mild spurring at the Achilles insertion. There is marked thickening at the middle cord of the plantar fascia which contains a small ossicle which is chronic. There is also focal thickening at the proximal lateral cortex of the plantar fascia likely related to old injury.   There is marked spurring dorsally at the second tarsometatarsal joint. There is edema within the sinus Tarsi with scarring.   The Lisfranc ligament is at the edge of the field although the interosseous ligament is grossly intact. The dorsal component is markedly thickened and chronically degenerated or torn. The distal syndesmotic ligaments are intact. The lateral collateral ligaments are intact. There is thickening at the deltoid ligament which is continuous.  Impression: Evaluation for soft tissue infection is limited without intravenous contrast. Heterogeneous marrow signal without an adjacent skin ulceration or fluid collection to suggest osteomyelitis.      ASSESSMENT:  The patient is a 64 year old male with PMHx of insulin dependent diabetes mellitus, hyperlipidemia, obesity, heart failure with preserved ejection fraction with mild LV diastolic dysfunction, MGUS, chronic anemia, duodenal ulcer, GAVE,  duodenal AVM s/p APC (last on 10/11/19) who presented with 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 The patient has a history of multiple recent hospitalizations due to UTIs, emphysematous cystitis and prostate abscess. The patient was re-admitted with hepatic encephalopathy and VRE UTI, also with MISA on CKD. S/p liver transplant 7/2/20.       Plan:  1) s/p OLT   Continue bactrim, valcyte for prophylaxis   Monitor patient for tremors - monitor tacrolimus dose   Off antibiotics   Removal of drains and lines as per surgical transplant team    2) Right heel wound  Podiatry on board    SICU, transplant, and hepatology on board     3) Encephalopathy  Monitor tacrolimus doses as above    Charlie Lafleur MD PGY-4   Fellow, Infectious Diseases   Pager: [phone]  If no response, after 5pm and on weekends: Call [phone] Patient is a 64y old  Male who presents with a chief complaint of Liver failure, hepatic encephalopathy (08 Jul 2020 12:37)    HPI:  Interval History/ROS: Patient is a 64 year old male with past medical history of insulin dependent diabtes mellitus, hyperlipidemia, obesity, heart failure with preserved ejection fraction with mild LV diastolic dysfunction, MGUS, chronic anemia, duodenal ulcer, GAVE,  duodenal AVM s/p APC (last on 10/11/19) who presented with 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 The patient was re-admitted with hepatic encephalopathy and VRE UTI, also with MISA on CKD. S/p liver transplant 7/2.     Today, he remains comfortable in no acute distress. Appears confused. Had no acute events overnight.     REVIEW OF SYSTEMS  [  ] ROS unobtainable because:    [ x] All other systems negative except as noted below:	    Constitutional:  [ ] fever [ ] chills  [ ] weight loss  [ ] weakness  Skin:  [ ] rash [ ] phlebitis	  Eyes: [ ] icterus [ ] pain  [ ] discharge	  ENMT: [ ] sore throat  [ ] thrush [ ] ulcers [ ] exudates  Respiratory: [ ] dyspnea [ ] hemoptysis [ ] cough [ ] sputum	  Cardiovascular:  [ ] chest pain [ ] palpitations [ ] edema	  Gastrointestinal:  [ ] nausea [ ] vomiting [ ] diarrhea [ ] constipation [ ] pain	  Genitourinary:  [ ] dysuria [ ] frequency [ ] hematuria [ ] discharge [ ] flank pain  [ ] incontinence  Musculoskeletal:  [ ] myalgias [ ] arthralgias [ ] arthritis  [ ] back pain  Neurological:  [ ] headache [ ] seizures  [x] confusion/altered mental status  Psychiatric:  [ ] anxiety [ ] depression	  Hematology/Lymphatics:  [ ] lymphadenopathy  Endocrine:  [ ] adrenal [ ] thyroid  Allergic/Immunologic:	 [ ] transplant [ ] seasonal        PAST MEDICAL & SURGICAL HISTORY:  GIB (gastrointestinal bleeding)  GERD with esophagitis: Gastritis &amp; Non Bleeding Ulcers  Hepatic encephalopathy  Obesity  Fatty liver disease, nonalcoholic  Renal stones: 25 years ago  Hypertension  Neuropathy  Hypercholesteremia  Diabetes  S/P cholecystectomy    Allergies  codeine (Anaphylaxis)    ANTIMICROBIALS:    nystatin    Suspension [id number] four times a day  trimethoprim   80 mG/sulfamethoxazole 400 mG 1 daily  valGANciclovir 450 daily    OTHER MEDS: MEDICATIONS  (STANDING):  aspirin enteric coated 81 daily  dextrose 40% Gel 15 once PRN  dextrose 50% Injectable 12.5 once  dextrose 50% Injectable 25 once  dextrose 50% Injectable 25 once  glucagon  Injectable 1 once PRN  insulin glargine Injectable (LANTUS) 12 at bedtime  insulin lispro (HumaLOG) corrective regimen sliding scale  three times a day before meals  insulin lispro (HumaLOG) corrective regimen sliding scale  at bedtime  insulin lispro Injectable (HumaLOG) 5 three times a day before meals  mycophenolate mofetil 1000 <User Schedule>  NIFEdipine XL 30 daily  oxyCODONE    IR 5 every 4 hours PRN  oxyCODONE    IR 10 every 4 hours PRN  pantoprazole  Injectable 40 every 12 hours  polyethylene glycol 3350 17 daily  QUEtiapine 25 at bedtime  senna 2 at bedtime  tacrolimus 3 <User Schedule>  tamsulosin 0.4 at bedtime    Vital Signs Last 24 Hrs  T(F): 97.7 (07-08-20 @ 11:00), Max: 99.5 (07-03-20 @ 04:00)  Vital Signs Last 24 Hrs  HR: 77 (07-08-20 @ 12:00) (73 - 81)  BP: 117/56 (07-08-20 @ 12:00) (101/53 - 136/67)  RR: 24 (07-08-20 @ 12:00)  SpO2: 93% (07-08-20 @ 12:00) (90% - 100%)  Wt(kg): --    EXAM:  Constitutional: Not in acute distress  Eyes: pupils bilaterally reactive to light. No icterus.  Oral cavity: Clear, no lesions  Neck: No neck vein distension noted  RS: Chest clear to auscultation bilaterally. No wheeze/rhonchi/crepitations.  CVS: S1, S2 heard. Regular rate and rhythm. No murmurs/rubs/gallops.  Abdomen: Soft. OLT Incision staple line is clear, without erythema or drainage. No guarding/rigidity/tenderness,   : No acute abnormalities  Extremities: Warm. No pedal edema, +without drainage or purulence in right heel   Skin: No lesions noted  Vascular: No evidence of phlebitis  Neuro: Alert, oriented to time/place/person                          10.4   4.70  )-----------( 68       ( 08 Jul 2020 00:19 )             31.3     07-08    131<L>  |  97  |  35<H>  ----------------------------<  256<H>  4.6   |  25  |  1.11    Ca    8.5      08 Jul 2020 00:19  Phos  3.0     07-08  Mg     2.0     07-08    TPro  4.7<L>  /  Alb  2.9<L>  /  TBili  1.7<H>  /  DBili  0.8<H>  /  AST  41<H>  /  ALT  123<H>  /  AlkPhos  117  07-08    MICROBIOLOGY:  No growth to date. (07-03 @ 11:00)  Culture Results:   No growth to date. (07-03 @ 11:00)  Culture Results:   No growth (07-03 @ 10:11)  Culture Results:   Testing in progress (07-03 @ 01:05)  Culture Results:   No growth (07-03 @ 01:05)    CMV IgG Antibody: <0.20 U/mL (12-04-19 @ 08:33)  Toxoplasma IgG Screen: <3.0 IU/mL (12-04-19 @ 08:33)    RADIOLOGY:    <The imaging below has been reviewed and visualized by me independently. Findings as detailed in report below>    06/18/20:  MRI right ankle with and without contrast: Evaluation for soft tissue infection is limited without intravenous contrast. Although there is heterogeneous marrow signal there is no skin ulceration, sinus tract or abscess identified with adjacent marrow signal abnormality suggest osteomyelitis.  There is edema within the abductor hallucis and flexor digitorum brevis muscles. There is minimal edema and marked atrophy within the adductor digit minimi muscle. These findings are likely related to denervation edema. The tendons at the ankle and hindfoot are intact. There is mild flexor hallucis longus tenosynovitis. There is mild spurring at the Achilles insertion. There is marked thickening at the middle cord of the plantar fascia which contains a small ossicle which is chronic. There is also focal thickening at the proximal lateral cortex of the plantar fascia likely related to old injury.   There is marked spurring dorsally at the second tarsometatarsal joint. There is edema within the sinus Tarsi with scarring.   The Lisfranc ligament is at the edge of the field although the interosseous ligament is grossly intact. The dorsal component is markedly thickened and chronically degenerated or torn. The distal syndesmotic ligaments are intact. The lateral collateral ligaments are intact. There is thickening at the deltoid ligament which is continuous.  Impression: Evaluation for soft tissue infection is limited without intravenous contrast. Heterogeneous marrow signal without an adjacent skin ulceration or fluid collection to suggest osteomyelitis.      ASSESSMENT:  The patient is a 64 year old male with PMHx of insulin dependent diabetes mellitus, hyperlipidemia, obesity, heart failure with preserved ejection fraction with mild LV diastolic dysfunction, MGUS, chronic anemia, duodenal ulcer, GAVE,  duodenal AVM s/p APC (last on 10/11/19) who presented with 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 The patient has a history of multiple recent hospitalizations due to UTIs, emphysematous cystitis and prostate abscess. The patient was re-admitted with hepatic encephalopathy and VRE UTI, also with MISA on CKD. S/p liver transplant 7/2/20.       Plan:  1) s/p OLT   Continue bactrim, valcyte for prophylaxis   Monitor patient for tremors - monitor tacrolimus dose   Off antibiotics   Removal of drains and lines as per surgical transplant team    2) Right heel wound  Podiatry on board    Local wound care   SICU, transplant, and hepatology on board     3) Encephalopathy  Monitor tacrolimus doses as above    Charlie Lafleur MD PGY-4   Fellow, Infectious Diseases   Pager: [phone]  If no response, after 5pm and on weekends: Call [phone] Patient is a 64y old  Male who presents with a chief complaint of Liver failure, hepatic encephalopathy (08 Jul 2020 12:37)    HPI:    Interval History/ROS: Patient remains agitated overnight and confused at my assessment. He was afebrile overnight. However, required nasal cannula and later today developed hypoxia to SpO2 in the 70-80% range.     REVIEW OF SYSTEMS  [  ] ROS unobtainable because:    [ x] All other systems negative except as noted below:	    Constitutional:  [ ] fever [ ] chills  [ ] weight loss  [ ] weakness  Skin:  [ ] rash [ ] phlebitis	  Eyes: [ ] icterus [ ] pain  [ ] discharge	  ENMT: [ ] sore throat  [ ] thrush [ ] ulcers [ ] exudates  Respiratory: [ ] dyspnea [ ] hemoptysis [ ] cough [ ] sputum	  Cardiovascular:  [ ] chest pain [ ] palpitations [ ] edema	  Gastrointestinal:  [ ] nausea [ ] vomiting [ ] diarrhea [ ] constipation [ ] pain	  Genitourinary:  [ ] dysuria [ ] frequency [ ] hematuria [ ] discharge [ ] flank pain  [ ] incontinence  Musculoskeletal:  [ ] myalgias [ ] arthralgias [ ] arthritis  [ ] back pain  Neurological:  [ ] headache [ ] seizures  [x] confusion/altered mental status  Psychiatric:  [ ] anxiety [ ] depression	  Hematology/Lymphatics:  [ ] lymphadenopathy  Endocrine:  [ ] adrenal [ ] thyroid  Allergic/Immunologic:	 [ ] transplant [ ] seasonal        PAST MEDICAL & SURGICAL HISTORY:  GIB (gastrointestinal bleeding)  GERD with esophagitis: Gastritis &amp; Non Bleeding Ulcers  Hepatic encephalopathy  Obesity  Fatty liver disease, nonalcoholic  Renal stones: 25 years ago  Hypertension  Neuropathy  Hypercholesteremia  Diabetes  S/P cholecystectomy    Allergies  codeine (Anaphylaxis)    ANTIMICROBIALS:    nystatin    Suspension [id number] four times a day  trimethoprim   80 mG/sulfamethoxazole 400 mG 1 daily  valGANciclovir 450 daily    OTHER MEDS: MEDICATIONS  (STANDING):  aspirin enteric coated 81 daily  dextrose 40% Gel 15 once PRN  dextrose 50% Injectable 12.5 once  dextrose 50% Injectable 25 once  dextrose 50% Injectable 25 once  glucagon  Injectable 1 once PRN  insulin glargine Injectable (LANTUS) 12 at bedtime  insulin lispro (HumaLOG) corrective regimen sliding scale  three times a day before meals  insulin lispro (HumaLOG) corrective regimen sliding scale  at bedtime  insulin lispro Injectable (HumaLOG) 5 three times a day before meals  mycophenolate mofetil 1000 <User Schedule>  NIFEdipine XL 30 daily  oxyCODONE    IR 5 every 4 hours PRN  oxyCODONE    IR 10 every 4 hours PRN  pantoprazole  Injectable 40 every 12 hours  polyethylene glycol 3350 17 daily  QUEtiapine 25 at bedtime  senna 2 at bedtime  tacrolimus 3 <User Schedule>  tamsulosin 0.4 at bedtime    Vital Signs Last 24 Hrs  T(F): 97.7 (07-08-20 @ 11:00), Max: 99.5 (07-03-20 @ 04:00)  Vital Signs Last 24 Hrs  HR: 77 (07-08-20 @ 12:00) (73 - 81)  BP: 117/56 (07-08-20 @ 12:00) (101/53 - 136/67)  RR: 24 (07-08-20 @ 12:00)  SpO2: 93% (07-08-20 @ 12:00) (90% - 100%)  Wt(kg): --    EXAM:  Constitutional: Not in acute distress  Eyes: pupils bilaterally reactive to light. No icterus.  Oral cavity: Clear, no lesions  Neck: No neck vein distension noted  RS: Chest clear to auscultation bilaterally. No wheeze/rhonchi/crepitations.  CVS: S1, S2 heard. Regular rate and rhythm. No murmurs/rubs/gallops.  Abdomen: Soft. OLT Incision staple line is clear, without erythema or drainage. No guarding/rigidity/tenderness,   : +condom catheter  Extremities: Warm. No pedal edema, R heel with medihoney on it but no obvious drainage. not tender to palpation.   Skin: No lesions noted  Vascular: No evidence of phlebitis  Neuro: Alert, oriented to time/place/person                          10.4   4.70  )-----------( 68       ( 08 Jul 2020 00:19 )             31.3     07-08    131<L>  |  97  |  35<H>  ----------------------------<  256<H>  4.6   |  25  |  1.11    Ca    8.5      08 Jul 2020 00:19  Phos  3.0     07-08  Mg     2.0     07-08    TPro  4.7<L>  /  Alb  2.9<L>  /  TBili  1.7<H>  /  DBili  0.8<H>  /  AST  41<H>  /  ALT  123<H>  /  AlkPhos  117  07-08    MICROBIOLOGY:    No growth to date. (07-03 @ 11:00)  Culture Results:   No growth to date. (07-03 @ 11:00)  Culture Results:   No growth (07-03 @ 10:11)  Culture Results:   Testing in progress (07-03 @ 01:05)  Culture Results:   No growth (07-03 @ 01:05)    CMV IgG Antibody: <0.20 U/mL (12-04-19 @ 08:33)  Toxoplasma IgG Screen: <3.0 IU/mL (12-04-19 @ 08:33)    RADIOLOGY:    <The imaging below has been reviewed and visualized by me independently. Findings as detailed in report below>    EXAM:  XR CHEST PORTABLE URGENT 1V                        PROCEDURE DATE:  07/09/2020    Small right base infiltrate.    06/18/20:  MRI right ankle with and without contrast: Evaluation for soft tissue infection is limited without intravenous contrast. Although there is heterogeneous marrow signal there is no skin ulceration, sinus tract or abscess identified with adjacent marrow signal abnormality suggest osteomyelitis.  There is edema within the abductor hallucis and flexor digitorum brevis muscles. There is minimal edema and marked atrophy within the adductor digit minimi muscle. These findings are likely related to denervation edema. The tendons at the ankle and hindfoot are intact. There is mild flexor hallucis longus tenosynovitis. There is mild spurring at the Achilles insertion. There is marked thickening at the middle cord of the plantar fascia which contains a small ossicle which is chronic. There is also focal thickening at the proximal lateral cortex of the plantar fascia likely related to old injury.   There is marked spurring dorsally at the second tarsometatarsal joint. There is edema within the sinus Tarsi with scarring.   The Lisfranc ligament is at the edge of the field although the interosseous ligament is grossly intact. The dorsal component is markedly thickened and chronically degenerated or torn. The distal syndesmotic ligaments are intact. The lateral collateral ligaments are intact. There is thickening at the deltoid ligament which is continuous.  Impression: Evaluation for soft tissue infection is limited without intravenous contrast. Heterogeneous marrow signal without an adjacent skin ulceration or fluid collection to suggest osteomyelitis.

## 2020-07-09 NOTE — PROGRESS NOTE ADULT - PROBLEM SELECTOR PLAN 3
: -defer statin in the setting of recent liver x-plant    call w/any questions  pager: [phone]   cell: [phone]  office: [phone]    -I spent a total time of 26 mins with the patient at bedside/on unit of which more than 50% of time was spent on counseling/coordination of care.

## 2020-07-09 NOTE — PROGRESS NOTE ADULT - ASSESSMENT
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    OLTx  - Good graft function, LFTs trending down  - Delirium ?Steroid psychosis; continue 1:1, cont Seroquel 25mg qhs.  - Immuno: FK by level, MMF 1g bid, Pred taper  - PPX: valcyte/nystatin/bactrim  - Continue ASA 81mg daily  - LE edema: Lasix 40mg PO x1 today  - ID: afebrile; bld cxs from 7/3 with NGTD; off meropenem since 7/7   - h/o UGI bleed: Continue Protonix 40mg IV bid    - Continue MgSo4 400mg BID, keep Mag >2  - Regular, carbohydrate restricted diet  - Pain control PRN  - DVT PPx: SCDs at all times      Hyperglycemia  - In setting of steroid taper  - Appreciate endocrinology recommendations  - Start Lantus and Humalog insulin wih SSI coverage  - Fingersticks ac and qhs    Hypertension  -Controlled  -Continue Nifedipine    -Heel ulcer  Continue local wound care w/ santyl and DSD as per podiatry    Continue ICU care

## 2020-07-09 NOTE — PROGRESS NOTE ADULT - ASSESSMENT
ASSESSMENT:  The patient is a 64 year old male with PMHx of insulin dependent diabetes mellitus, hyperlipidemia, obesity, heart failure with preserved ejection fraction with mild LV diastolic dysfunction, MGUS, chronic anemia, duodenal ulcer, GAVE,  duodenal AVM s/p APC (last on 10/11/19) who presented with 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 The patient has a history of multiple recent hospitalizations due to UTIs, emphysematous cystitis and prostate abscess. The patient was re-admitted with hepatic encephalopathy and VRE UTI, also with MISA on CKD. S/p liver transplant 7/2/20.     Now with worsening encephalopathy and hypoxia today  CXR with RLL infiltrate  ?Aspiration PNA  Given prior Hx of resistant organisms would start meropenem after obtaining workup below    Plan:    #RLL Pneumonia, Hypoxic Respiratory Failure, Encephalopathy  --Recommend 2x blood cultures  --Recommend procalcitonin  --Recommend MRSA/MSSA PCR  --Recommend COVID19 PCR   --Recommend Meropenem 1g IV Q8H    # s/p OLT   Continue bactrim, valcyte for prophylaxis   Monitor patient for tremors - monitor tacrolimus dose   Off antibiotics   Removal of drains and lines as per surgical transplant team    #Right heel wound  Podiatry on board    Local wound care   SICU, transplant, and hepatology on board     Charlie Lafleur MD PGY-4   Fellow, Infectious Diseases   Pager: [phone]  If no response, after 5pm and on weekends: Call [phone]

## 2020-07-09 NOTE — PROGRESS NOTE ADULT - ASSESSMENT
64 M hx of DM, HLD, GERD, HFpEF with mild LV diastolic dysfunction, and decompensated JEAN cirrhosis c/b ascites with hx of SBP, HE, duodenal ulcer, GAVE and duodenal AVM s/p APC (last on 10/11/19), UNOS listed for liver transplantation at [facility]. 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 He is now re-admitted with hepatic encephalopathy and VRE UTI, also with MISA on CKD. S/p liver transplant 7/2.    Impression:  #S/p liver transplant 7/2: live enzymes continue to downtrend  OR details:  - L groin cutdown for vv bypass   - anastomosis: bicaval end-end/CBD duct-duct/HA & PV end-end, all standard anatomy.    - IOC with good flow  - Simulect induction. 500mg Solumedrol  - JPs x3 with T-Tube  - 14U pRBC, 14U FFP, 10 PLT, 11 CRYO, 500mL returned from cell saver, EBL 5L, UOP 1100mL  Recipient Info:   ABO: A  CMV:  Neg  EBV Positive  Donor:  Donor ID:  [id number] Match: [id number]  Age: 29 ABO:  A1 High Risk:   No COD: Cardiovascular  Anti CMV positive, EBV IgG- positive  HepBcAb-neg, Hepatitis C-DANA- neg, Hepatitis C ab-neg  #Tremors – ongoing. c/f taco toxicity +/- prednisone and opioid effects. May need medication change based on clinical course  #Halluciniations/agitation: c/f prednisone toxicity with visual hallucinations. Hallucinations improving although still agitated with confusion.  #GI bleed - Hgb stable. No overt bleeding. 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 GAVE and PHG should have improved w/ liver transplant, most likely etiology is NG tube trauma prior if any c/f GI bleed  # Hypertension on nifedipine now. BP improved  # Lower ext edema: diuresing, on lasix  #R posterior heel wound w/ overlying eschar – on meropenem for 1 more day. Local wound care. no signs of infections, XR neg for gas, evaluated by podiatry and ID. MRI w/o contrast limited, but no overt signs of active infection.  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  # Hyperglycemia with episodes of overcorrection    Recommendation:  - remove drain/lines as able (per surgery/SICU)  - additional oral lasix 40mg today  - continue with standing magnesium  - monitor renal function  - c/w cellcept, methylpred taper with monitoring mental status  - c/w tacro for now adjusted by level, may need to switch to CsA if neurotoxicity worsening  - glycemic control with insulin, monitor for hypoglycemia  - s/p course of meropenem  - c/w nystatin, Bactrim, valcyte ppx  - continue with seroquel and psychosis management per primary team  - trend Hb- PO PPI BID, monitor bowel movements  - aspirin per surgery  - advance diet, low Na diet  - wound care/nursing for R heel ulcer  - continue with 1:1 for agitation precaution  - f/u transplant surgery recs  - rest of care per SICU team  - transplant hepatology will follow 64 M hx of DM, HLD, GERD, HFpEF with mild LV diastolic dysfunction, and decompensated JEAN cirrhosis c/b ascites with hx of SBP, HE, duodenal ulcer, GAVE and duodenal AVM s/p APC (last on 10/11/19), UNOS listed for liver transplantation at [facility]. 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 He is now re-admitted with hepatic encephalopathy and VRE UTI, also with MISA on CKD. S/p liver transplant 7/2.    Impression:  #S/p liver transplant 7/2: live enzymes continue to downtrend  OR details:  - L groin cutdown for vv bypass   - anastomosis: bicaval end-end/CBD duct-duct/HA & PV end-end, all standard anatomy.    - IOC with good flow  - Simulect induction. 500mg Solumedrol  - JPs x3 with T-Tube  - 14U pRBC, 14U FFP, 10 PLT, 11 CRYO, 500mL returned from cell saver, EBL 5L, UOP 1100mL  Recipient Info:   ABO: A  CMV:  Neg  EBV Positive  Donor:  Donor ID:  [id number] Match: [id number]  Age: 29 ABO:  A1 High Risk:   No COD: Cardiovascular  Anti CMV positive, EBV IgG- positive  HepBcAb-neg, Hepatitis C-DANA- neg, Hepatitis C ab-neg  #Tremors – ongoing. c/f taco toxicity +/- prednisone and opioid effects. May need medication change based on clinical course  #Halluciniations/agitation: c/f prednisone toxicity with visual hallucinations. Hallucinations improving although still agitated with confusion.  #GI bleed - Hgb stable. No overt bleeding. 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 GAVE and PHG should have improved w/ liver transplant, most likely etiology is NG tube trauma prior if any c/f GI bleed  # Hypertension on nifedipine now. BP improved  # Lower ext edema: diuresing, on lasix  #R posterior heel wound w/ overlying eschar – on meropenem for 1 more day. Local wound care. no signs of infections, XR neg for gas, evaluated by podiatry and ID. MRI w/o contrast limited, but no overt signs of active infection.  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  # Hyperglycemia with episodes of overcorrection    Recommendation:  - remove drain/lines as able (per surgery/SICU)  - additional lasix 40mg today  - continue with standing magnesium  - monitor renal function  - c/w cellcept, methylpred taper with monitoring mental status  - c/w tacro for now adjusted by level, may need to switch to CsA if neurotoxicity worsening  - glycemic control with insulin, monitor for hypoglycemia  - s/p course of meropenem  - c/w nystatin, Bactrim, valcyte ppx  - continue with seroquel and psychosis management per primary team  - trend Hb- PO PPI BID, monitor bowel movements  - aspirin per surgery  - advance diet, low Na diet  - wound care/nursing for R heel ulcer  - continue with 1:1 for agitation precaution  - f/u transplant surgery recs  - rest of care per SICU team  - transplant hepatology will follow

## 2020-07-09 NOTE — PROGRESS NOTE ADULT - ATTENDING COMMENTS
improved delirium with haloperidol overnight  reduced dose of insulin given lower sugars  synthetic function of liver improving    - monitor for signs of delirium, haldol as needed  - immunosuppresion per transplant   - cont diet  - work w PT  - insulin pre meal and lantus  - remains in SICU for neurologic monitoring  - lasix per transplant  - no VTE PPX per transplant checmically, w SCD on

## 2020-07-09 NOTE — PROGRESS NOTE ADULT - ASSESSMENT
65 yo M with hx of T2DM uncontrolled due to steroids x 10 years with neuropathy and R DFU of the heel, HTN, HLD, MGUS, and decompensated JEAN cirrhosis s/p liver x-plant on 7/2/2020 admitted 6/11/2020 with confusion secondary to liver failure and encephalopathy. Endocrine consulted for glycemic control. S/p high dose steroid taper, now on Prednisone 5mg daily. Tolerating POs. Had hypoglycemic episode yesterday, now w/adjusted insulin regimen. BG goal (100-180mg/dl).

## 2020-07-09 NOTE — PROGRESS NOTE ADULT - ATTENDING COMMENTS
agree with above  good graft function  continues to be occasionally confused and with visual hallucinations  will monitor closely  immunosuppression tacro, mmf and steroids

## 2020-07-09 NOTE — PROGRESS NOTE ADULT - ASSESSMENT
PLAN:   Neurologic: tremors, hallucinations, confusion 2/2 ?tacro toxicity  - Oxy 5/10 prn for pain  - Neuro status worsening, appears more confused    Respiratory:   - IS, OOBTC as tolerated  - Continuous pulse oximetry    Cardiovascular: Intermittent hypertension  - Nifedipine 30mg daily  - Labetalol PRN for SBP goal <145  - ASA  - Monitor hemodynamic status    Gastrointestinal/Nutrition: GIB, JEAN cirrhosis, GAVE syndrome. S/p liver transplant   - Regular, consistent carb diet. Tolerating well.  - Protonix 40mg BID  - Trend LFTs, currently downtrending    Renal: urinary retention, hyponatremia  - C/w flomax for urinary retention  - Trend BMPs, replete lytes prn  - Lasix prn     Heme: coagulopathy  - Venodynes for mechanical VTE prophylaxis, holding chemical VTE prophylaxis in the setting of coagulopathy/GIB.  - Trend H/H, coags    Infectious Disease: immunosuppresion   - Continue PPx as per transplant Bactrim, valcyte  - Continue immunosuppression as per transplant solumedrol, cellcept, tacro    Endo: DM type II, HLD  - ISS, lantus 12u qhs, 5u premeal/qhs insulin  - Monitor FS premeal and qhs    Disposition:  - SICU full code

## 2020-07-09 NOTE — PROGRESS NOTE ADULT - SUBJECTIVE AND OBJECTIVE BOX
Transplant Surgery - Multidisciplinary Rounds  --------------------------------------------------------------  OLTx      Date:  7/2/2020       POD#7    Present: Patient seen with multidisciplinary team including Transplant Surgeon: Dr. Lucio, Transplant Hepatologist Dr. Mo, Transplant Pharmacist Ancelmo chen, NP Oswald, OLY Bingham, and surgical resident Nael Quezada during am rounds and examined with Dr. Lucio. Disciplines not in attendance will be notified of the plan.     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     Re-admitted on 6/11:   ·	worsening HE  ·	UTI/VRE: tx with linezolid 6/15-22, bactrim DS 6/22 - 7/2  ·	MISA/Hyponatremia  ·	UGI bleed (melena) s/p EGD (6/22) c/w hemorrhagic duodenitis/gastritis; Grade 2 EV; requiring multiple transfusions  ·	Known h/o R foot ulcer (MRI neg for OM)  ·	s/p OLTx on 7/2/2020, post op liver doppler with patent vessels  ·	post op course c/b delirium     OR details:  - L groin cutdown for vv bypass   - anastomosis: bicaval end-end/CBD duct-duct/HA & PV end-end, all standard anatomy.    - IOC with good flow  - Simulect induction. 500mg Solumedrol  - JPs x3 with T-Tube  - 14U pRBC, 14U FFP, 10 PLT, 11 CRYO, 500mL returned from cell saver, EBL 5L, UOP 1100mL    Recipient Info: ABO: A  CMV:  Neg  EBV Positive    Donor:  Donor ID:  [id number] Match: [id number]  Age: 29 ABO:  A1 High Risk:   No COD: Cardiovascular  Anti CMV positive, EBV IgG- positive  HepBcAb-neg, Hepatitis C-DANA- neg, Hepatitis C ab-neg    Interval events:  - POD 7 s/p OLT with good graft function: LFTs downtrending; INR 1.1, T bili 1.6  - Remains delirious/agitated requiring Haldol this am, started on Seroquel last night, following commands, on 1:1  - Having UE tremors (better)   - Drains: ANDREINA 170SS drainage; T tube clamped this am   - H/H stable: 10/32  - Mag 2    Potential discharge date: pending clinical improvement    Education: Medication    Plan of Care: See below    MEDICATIONS  (STANDING):  aspirin enteric coated 81 milliGRAM(s) Oral daily  chlorhexidine 2% Cloths 1 Application(s) Topical <User Schedule>  collagenase Ointment 1 Application(s) Topical daily  insulin glargine Injectable (LANTUS) 12 Unit(s) SubCutaneous at bedtime  insulin lispro (HumaLOG) corrective regimen sliding scale   SubCutaneous three times a day before meals  insulin lispro (HumaLOG) corrective regimen sliding scale   SubCutaneous at bedtime  insulin lispro Injectable (HumaLOG) 5 Unit(s) SubCutaneous three times a day before meals  magnesium oxide 400 milliGRAM(s) Oral two times a day with meals  mycophenolate mofetil 1000 milliGRAM(s) Oral <User Schedule>  NIFEdipine XL 30 milliGRAM(s) Oral daily  nystatin    Suspension 480069 Unit(s) Swish and Swallow four times a day  nystatin Cream 1 Application(s) Topical two times a day  pantoprazole  Injectable 40 milliGRAM(s) IV Push every 12 hours  polyethylene glycol 3350 17 Gram(s) Oral daily  predniSONE   Tablet 5 milliGRAM(s) Oral daily  QUEtiapine 25 milliGRAM(s) Oral at bedtime  senna 2 Tablet(s) Oral at bedtime  tacrolimus 3 milliGRAM(s) Oral <User Schedule>  tamsulosin 0.4 milliGRAM(s) Oral at bedtime  trimethoprim   80 mG/sulfamethoxazole 400 mG 1 Tablet(s) Oral daily  valGANciclovir 450 milliGRAM(s) Oral daily    MEDICATIONS  (PRN):  glucagon  Injectable 1 milliGRAM(s) IntraMuscular once PRN Glucose <70 milliGRAM(s)/deciLiter      PAST MEDICAL & SURGICAL HISTORY:  GIB (gastrointestinal bleeding)  GERD with esophagitis: Gastritis &amp; Non Bleeding Ulcers  Hepatic encephalopathy  Obesity  Fatty liver disease, nonalcoholic  Renal stones: 25 years ago  Hypertension  Neuropathy  Hypercholesteremia  Diabetes  S/P cholecystectomy    Vital Signs Last 24 Hrs  T(C): 36.9 (09 Jul 2020 03:00), Max: 36.9 (09 Jul 2020 03:00)  T(F): 98.4 (09 Jul 2020 03:00), Max: 98.4 (09 Jul 2020 03:00)  HR: 79 (09 Jul 2020 10:00) (69 - 80)  BP: 139/58 (09 Jul 2020 10:00) (89/51 - 153/80)  BP(mean): 84 (09 Jul 2020 10:00) (66 - 101)  RR: 30 (09 Jul 2020 10:00) (14 - 32)  SpO2: 94% (09 Jul 2020 10:00) (78% - 100%)    I&O's Summary    08 Jul 2020 07:01  -  09 Jul 2020 07:00  --------------------------------------------------------  IN: 770 mL / OUT: 1620 mL / NET: -850 mL    09 Jul 2020 07:01  -  09 Jul 2020 11:08  --------------------------------------------------------  IN: 650 mL / OUT: 0 mL / NET: 650 mL                         10.5   3.79  )-----------( 66       ( 09 Jul 2020 04:37 )             32.0     07-09    136  |  102  |  32<H>  ----------------------------<  134<H>  4.3   |  28  |  1.04    Ca    8.6      09 Jul 2020 04:37  Phos  2.9     07-09  Mg     2.0     07-09    TPro  4.7<L>  /  Alb  2.8<L>  /  TBili  1.6<H>  /  DBili  0.8<H>  /  AST  33  /  ALT  96<H>  /  AlkPhos  83  07-09    Tacrolimus (), Serum: 10.2 ng/mL (07-08 @ 07:56)    Culture - Blood (collected 07-03-20 @ 11:00)  Source: .Blood Blood  Final Report (07-08-20 @ 11:01):    No Growth Final    Culture - Blood (collected 07-03-20 @ 11:00)  Source: .Blood Blood  Final Report (07-08-20 @ 11:01):    No Growth Final    Culture - Urine (collected 07-03-20 @ 10:11)  Source: .Urine Catheterized  Final Report (07-04-20 @ 07:24):    No growth    Culture - Surgical Swab (collected 07-03-20 @ 01:05)  Source: .Surgical Swab abdominal ascites culture  Final Report (07-07-20 @ 19:08):    No growth at 5 days    Culture - Fungal, Other (collected 07-03-20 @ 01:05)  Source: .Other Other  Preliminary Report (07-06-20 @ 07:35):    Testing in progress    Review of systems  Gen: fatigue/weakness/confusion/agitation  Skin: no rashes  Head/Eyes/Ears/Mouth: no headache, no changes in vision  Respiratory: no SOB  CV: no chest pain  GI: no constipation, nausea or vomiting  : voiding  MSK: + LE edema  Neuro:  no seizures, + tremulous, agitated  Heme: no easy bruising  Endo: no heat/cold intolerance  Psych: no significant nervousness, anxiety, stress  All other systems were reviewed and are negative, except as noted     PHYSICAL EXAM:  Constitutional: restless  Eyes: mild icterus  ENMT: NC/AT  Neck: supple  Respiratory: CTA b/l  Cardiovascular: RRR  Gastrointestinal: Soft abdomen, mildly distended; + mild incisional tenderness, incision intact with staples, T-tube tied this am; JPs x 1 with ss output  Genitourinary: voiding spontaneously/condom cath  Extremities: SCD's in place and working bilaterally. L groin cutdown site c/d/i  Vascular: Palpable dp pulses bilaterally  Neurological: AO x 2, upper body tremors improved today, agitated, confused  Skin: no rashes, ulcerations, lesions post-op  Psychiatric: awake, alert and responsive

## 2020-07-09 NOTE — PROGRESS NOTE ADULT - SUBJECTIVE AND OBJECTIVE BOX
Chief Complaint:  Patient is a 64y old  Male who presents with a chief complaint of Liver failure, hepatic encephalopathy (2020 11:02)      Interval Events: Continues to be agitated with psychosis. Received Seroquel and haldol. Otherwise no other events.    Allergies:  codeine (Anaphylaxis)      Hospital Medications:  aspirin enteric coated 81 milliGRAM(s) Oral daily  chlorhexidine 2% Cloths 1 Application(s) Topical <User Schedule>  collagenase Ointment 1 Application(s) Topical daily  glucagon  Injectable 1 milliGRAM(s) IntraMuscular once PRN  insulin glargine Injectable (LANTUS) 12 Unit(s) SubCutaneous at bedtime  insulin lispro (HumaLOG) corrective regimen sliding scale   SubCutaneous three times a day before meals  insulin lispro (HumaLOG) corrective regimen sliding scale   SubCutaneous at bedtime  insulin lispro Injectable (HumaLOG) 5 Unit(s) SubCutaneous three times a day before meals  magnesium oxide 400 milliGRAM(s) Oral two times a day with meals  mycophenolate mofetil 1000 milliGRAM(s) Oral <User Schedule>  NIFEdipine XL 30 milliGRAM(s) Oral daily  nystatin    Suspension 230498 Unit(s) Swish and Swallow four times a day  nystatin Cream 1 Application(s) Topical two times a day  pantoprazole  Injectable 40 milliGRAM(s) IV Push every 12 hours  polyethylene glycol 3350 17 Gram(s) Oral daily  predniSONE   Tablet 5 milliGRAM(s) Oral daily  QUEtiapine 25 milliGRAM(s) Oral at bedtime  senna 2 Tablet(s) Oral at bedtime  tacrolimus 2.5 milliGRAM(s) Oral <User Schedule>  tamsulosin 0.4 milliGRAM(s) Oral at bedtime  trimethoprim   80 mG/sulfamethoxazole 400 mG 1 Tablet(s) Oral daily  valGANciclovir 450 milliGRAM(s) Oral daily      PMHX/PSHX:  GIB (gastrointestinal bleeding)  GERD with esophagitis  Hepatic encephalopathy  Obesity  Fatty liver disease, nonalcoholic  Renal stones  Hypertension  Neuropathy  Hypercholesteremia  Diabetes  S/P cholecystectomy  No significant past surgical history      Family history:  Family history of type 2 diabetes mellitus  Family history of hypertension  Family history of stomach cancer  No pertinent family history in first degree relatives      ROS:   General:  No wt loss, fevers, chills, night sweats, fatigue,   Eyes:  Good vision, no reported pain  ENT:  No sore throat, pain, runny nose, dysphagia  CV:  No pain, palpitations, hypo/hypertension  Resp:  No dyspnea, cough, tachypnea, wheezing  GI:  See HPI  :  No pain, bleeding, incontinence, nocturia  Muscle:  No pain, weakness  Neuro:  No weakness, tingling, memory problems  Psych:  As per HPI. No fatigue, insomnia, mood problems, depression  Endocrine:  No polyuria, polydipsia, cold/heat intolerance  Heme:  No petechiae, ecchymosis, easy bruisability  Skin:  No rash, edema, foot wound    PHYSICAL EXAM:     Vital Signs:  Vital Signs Last 24 Hrs  T(C): 36.8 (2020 11:00), Max: 36.9 (2020 03:00)  T(F): 98.2 (2020 11:00), Max: 98.4 (2020 03:00)  HR: 79 (2020 12:00) (69 - 80)  BP: 110/57 (2020 12:00) (89/51 - 153/80)  BP(mean): 76 (2020 12:00) (66 - 101)  RR: 18 (2020 12:00) (14 - 32)  SpO2: 95% (2020 12:00) (78% - 100%)  Daily     Daily Weight in k.8 (2020 02:08)    GENERAL: no acute distress  NEURO: alert, AAOx2, +tremors, confused but answers questions with occasional tangential answers  HEENT: anicteric sclera, no conjunctival pallor appreciated  CHEST: no respiratory distress, no accessory muscle use  CARDIAC: regular rate, rhythm  ABDOMEN: soft, incisions intact, ANDREINA drains in place w/ serosanguinous drainage, T-tube w/o drainage  EXTREMITIES: warm, well perfused, trace edema  SKIN: R heel with small open wound with overlying dressing    LABS:                        10.5   3.79  )-----------( 66       ( 2020 04:37 )             32.0     07-09    136  |  102  |  32<H>  ----------------------------<  134<H>  4.3   |  28  |  1.04    Ca    8.6      2020 04:37  Phos  2.9     07-09  Mg     2.0     07-09    TPro  4.7<L>  /  Alb  2.8<L>  /  TBili  1.6<H>  /  DBili  0.8<H>  /  AST  33  /  ALT  96<H>  /  AlkPhos  83      LIVER FUNCTIONS - ( 2020 04:37 )  Alb: 2.8 g/dL / Pro: 4.7 g/dL / ALK PHOS: 83 U/L / ALT: 96 U/L / AST: 33 U/L / GGT: x           PT/INR - ( 2020 04:37 )   PT: 13.3 sec;   INR: 1.16 ratio         PTT - ( 2020 04:37 )  PTT:27.0 sec    Amylase Serum--      Lipase serum--       [id number]      Imaging:

## 2020-07-09 NOTE — PROGRESS NOTE ADULT - SUBJECTIVE AND OBJECTIVE BOX
INTERVAL HPI/OVERNIGHT EVENTS:    events noted  still with episodes of hallucinations        Allergies    codeine (Anaphylaxis)    Intolerances    General:  No wt loss, fevers, chills, night sweats, fatigue,   Eyes:  Good vision, no reported pain  ENT:  No sore throat, pain, runny nose, dysphagia  CV:  No pain, palpitations, hypo/hypertension  Resp:  No dyspnea, cough, tachypnea, wheezing  GI:  No pain, No nausea, No vomiting, No diarrhea, No constipation, No weight loss, No fever, No pruritis, No rectal bleeding, No tarry stools, No dysphagia,  :  No pain, bleeding, incontinence, nocturia  Muscle:  No pain, weakness  Neuro:  No weakness, tingling, memory problems  Psych:  No fatigue, insomnia, mood problems, depression  Endocrine:  No polyuria, polydipsia, cold/heat intolerance  Heme:  No petechiae, ecchymosis, easy bruisability  Skin:  No rash, tattoos, scars, edema    PHYSICAL EXAM:   Vital Signs:  Vital Signs Last 24 Hrs  T(C): 36.7 (14 Jun 2020 07:53), Max: 36.7 (14 Jun 2020 07:53)  T(F): 98.1 (14 Jun 2020 07:53), Max: 98.1 (14 Jun 2020 07:53)  HR: 79 (14 Jun 2020 07:53) (75 - 80)  BP: 127/77 (14 Jun 2020 07:53) (108/53 - 133/68)  BP(mean): --  RR: 18 (14 Jun 2020 07:53) (17 - 18)  SpO2: 97% (14 Jun 2020 07:53) (97% - 100%)  Daily     Daily I&O's Summary    13 Jun 2020 07:01  -  14 Jun 2020 07:00  --------------------------------------------------------  IN: 350 mL / OUT: 250 mL / NET: 100 mL    14 Jun 2020 07:01  -  14 Jun 2020 12:30  --------------------------------------------------------  IN: 0 mL / OUT: 350 mL / NET: -350 mL        GENERAL:  no distress  HEENT:  NC/AT   ABDOMEN:  Soft, non-tender, non-distended, normoactive bowel sounds  EXTEREMITIES:  + edema  SKIN:  No rash/erythema/ecchymoses/petechiae/wounds/abscess/warm/dry  NEURO:  awake, alert, oriented, +asterixis      LABS:                        9.0    5.07  )-----------( 68       ( 14 Jun 2020 07:17 )             26.4     06-14    131<L>  |  100  |  27<H>  ----------------------------<  141<H>  5.1   |  20<L>  |  1.74<H>    Ca    10.5      14 Jun 2020 07:17    TPro  6.3  /  Alb  3.8  /  TBili  11.0<H>  /  DBili  x   /  AST  24  /  ALT  15  /  AlkPhos  133<H>  06-14    PT/INR - ( 14 Jun 2020 09:19 )   PT: 26.3 sec;   INR: 2.23 ratio         PTT - ( 14 Jun 2020 09:19 )  PTT:44.2 sec    amylase   lipase  RADIOLOGY & ADDITIONAL TESTS:

## 2020-07-10 LAB
ALBUMIN SERPL ELPH-MCNC: 2.6 G/DL — LOW (ref 3.3–5)
ALBUMIN SERPL ELPH-MCNC: 2.8 G/DL — LOW (ref 3.3–5)
ALP SERPL-CCNC: 106 U/L — SIGNIFICANT CHANGE UP (ref 40–120)
ALP SERPL-CCNC: 116 U/L — SIGNIFICANT CHANGE UP (ref 40–120)
ALT FLD-CCNC: 75 U/L — HIGH (ref 10–45)
ALT FLD-CCNC: 90 U/L — HIGH (ref 10–45)
AMMONIA BLD-MCNC: 46 UMOL/L — SIGNIFICANT CHANGE UP (ref 11–55)
AMMONIA BLD-MCNC: 47 UMOL/L — SIGNIFICANT CHANGE UP (ref 11–55)
ANION GAP SERPL CALC-SCNC: 6 MMOL/L — SIGNIFICANT CHANGE UP (ref 5–17)
ANION GAP SERPL CALC-SCNC: 7 MMOL/L — SIGNIFICANT CHANGE UP (ref 5–17)
APPEARANCE UR: CLEAR — SIGNIFICANT CHANGE UP
APTT BLD: 25.4 SEC — LOW (ref 27.5–35.5)
APTT BLD: 28.3 SEC — SIGNIFICANT CHANGE UP (ref 27.5–35.5)
AST SERPL-CCNC: 25 U/L — SIGNIFICANT CHANGE UP (ref 10–40)
AST SERPL-CCNC: 31 U/L — SIGNIFICANT CHANGE UP (ref 10–40)
BILIRUB DIRECT SERPL-MCNC: 0.6 MG/DL — HIGH (ref 0–0.2)
BILIRUB DIRECT SERPL-MCNC: 0.7 MG/DL — HIGH (ref 0–0.2)
BILIRUB INDIRECT FLD-MCNC: 0.7 MG/DL — SIGNIFICANT CHANGE UP (ref 0.2–1)
BILIRUB INDIRECT FLD-MCNC: 0.9 MG/DL — SIGNIFICANT CHANGE UP (ref 0.2–1)
BILIRUB SERPL-MCNC: 1.3 MG/DL — HIGH (ref 0.2–1.2)
BILIRUB SERPL-MCNC: 1.6 MG/DL — HIGH (ref 0.2–1.2)
BILIRUB UR-MCNC: NEGATIVE — SIGNIFICANT CHANGE UP
BLD GP AB SCN SERPL QL: NEGATIVE — SIGNIFICANT CHANGE UP
BUN SERPL-MCNC: 26 MG/DL — HIGH (ref 7–23)
BUN SERPL-MCNC: 27 MG/DL — HIGH (ref 7–23)
CALCIUM SERPL-MCNC: 8.1 MG/DL — LOW (ref 8.4–10.5)
CALCIUM SERPL-MCNC: 8.6 MG/DL — SIGNIFICANT CHANGE UP (ref 8.4–10.5)
CHLORIDE SERPL-SCNC: 101 MMOL/L — SIGNIFICANT CHANGE UP (ref 96–108)
CHLORIDE SERPL-SCNC: 102 MMOL/L — SIGNIFICANT CHANGE UP (ref 96–108)
CO2 SERPL-SCNC: 27 MMOL/L — SIGNIFICANT CHANGE UP (ref 22–31)
CO2 SERPL-SCNC: 28 MMOL/L — SIGNIFICANT CHANGE UP (ref 22–31)
COLOR SPEC: SIGNIFICANT CHANGE UP
CREAT SERPL-MCNC: 1 MG/DL — SIGNIFICANT CHANGE UP (ref 0.5–1.3)
CREAT SERPL-MCNC: 1.06 MG/DL — SIGNIFICANT CHANGE UP (ref 0.5–1.3)
DIFF PNL FLD: ABNORMAL
ENTEROCOC DNA BLD POS QL NAA+NON-PROBE: SIGNIFICANT CHANGE UP
GAS PNL BLDA: SIGNIFICANT CHANGE UP
GAS PNL BLDA: SIGNIFICANT CHANGE UP
GLUCOSE BLDC GLUCOMTR-MCNC: 123 MG/DL — HIGH (ref 70–99)
GLUCOSE BLDC GLUCOMTR-MCNC: 130 MG/DL — HIGH (ref 70–99)
GLUCOSE BLDC GLUCOMTR-MCNC: 146 MG/DL — HIGH (ref 70–99)
GLUCOSE BLDC GLUCOMTR-MCNC: 249 MG/DL — HIGH (ref 70–99)
GLUCOSE SERPL-MCNC: 133 MG/DL — HIGH (ref 70–99)
GLUCOSE SERPL-MCNC: 258 MG/DL — HIGH (ref 70–99)
GLUCOSE UR QL: NEGATIVE — SIGNIFICANT CHANGE UP
GRAM STN FLD: SIGNIFICANT CHANGE UP
HCT VFR BLD CALC: 29.5 % — LOW (ref 39–50)
HCT VFR BLD CALC: 30.7 % — LOW (ref 39–50)
HGB BLD-MCNC: 10.3 G/DL — LOW (ref 13–17)
HGB BLD-MCNC: 9.7 G/DL — LOW (ref 13–17)
INR BLD: 1.14 RATIO — SIGNIFICANT CHANGE UP (ref 0.88–1.16)
INR BLD: 1.2 RATIO — HIGH (ref 0.88–1.16)
KETONES UR-MCNC: NEGATIVE — SIGNIFICANT CHANGE UP
LEUKOCYTE ESTERASE UR-ACNC: NEGATIVE — SIGNIFICANT CHANGE UP
MAGNESIUM SERPL-MCNC: 1.8 MG/DL — SIGNIFICANT CHANGE UP (ref 1.6–2.6)
MAGNESIUM SERPL-MCNC: 1.8 MG/DL — SIGNIFICANT CHANGE UP (ref 1.6–2.6)
MCHC RBC-ENTMCNC: 31.9 PG — SIGNIFICANT CHANGE UP (ref 27–34)
MCHC RBC-ENTMCNC: 31.9 PG — SIGNIFICANT CHANGE UP (ref 27–34)
MCHC RBC-ENTMCNC: 32.9 GM/DL — SIGNIFICANT CHANGE UP (ref 32–36)
MCHC RBC-ENTMCNC: 33.6 GM/DL — SIGNIFICANT CHANGE UP (ref 32–36)
MCV RBC AUTO: 95 FL — SIGNIFICANT CHANGE UP (ref 80–100)
MCV RBC AUTO: 97 FL — SIGNIFICANT CHANGE UP (ref 80–100)
METHOD TYPE: SIGNIFICANT CHANGE UP
MRSA PCR RESULT.: SIGNIFICANT CHANGE UP
NITRITE UR-MCNC: NEGATIVE — SIGNIFICANT CHANGE UP
NRBC # BLD: 0 /100 WBCS — SIGNIFICANT CHANGE UP (ref 0–0)
NRBC # BLD: 0 /100 WBCS — SIGNIFICANT CHANGE UP (ref 0–0)
PH UR: 6.5 — SIGNIFICANT CHANGE UP (ref 5–8)
PHOSPHATE SERPL-MCNC: 2.7 MG/DL — SIGNIFICANT CHANGE UP (ref 2.5–4.5)
PHOSPHATE SERPL-MCNC: 2.8 MG/DL — SIGNIFICANT CHANGE UP (ref 2.5–4.5)
PLATELET # BLD AUTO: 68 K/UL — LOW (ref 150–400)
PLATELET # BLD AUTO: 73 K/UL — LOW (ref 150–400)
POTASSIUM SERPL-MCNC: 4.4 MMOL/L — SIGNIFICANT CHANGE UP (ref 3.5–5.3)
POTASSIUM SERPL-MCNC: 4.5 MMOL/L — SIGNIFICANT CHANGE UP (ref 3.5–5.3)
POTASSIUM SERPL-SCNC: 4.4 MMOL/L — SIGNIFICANT CHANGE UP (ref 3.5–5.3)
POTASSIUM SERPL-SCNC: 4.5 MMOL/L — SIGNIFICANT CHANGE UP (ref 3.5–5.3)
PROT SERPL-MCNC: 4.5 G/DL — LOW (ref 6–8.3)
PROT SERPL-MCNC: 4.8 G/DL — LOW (ref 6–8.3)
PROT UR-MCNC: NEGATIVE — SIGNIFICANT CHANGE UP
PROTHROM AB SERPL-ACNC: 13 SEC — SIGNIFICANT CHANGE UP (ref 10.6–13.6)
PROTHROM AB SERPL-ACNC: 13.6 SEC — SIGNIFICANT CHANGE UP (ref 10.6–13.6)
RBC # BLD: 3.04 M/UL — LOW (ref 4.2–5.8)
RBC # BLD: 3.23 M/UL — LOW (ref 4.2–5.8)
RBC # FLD: 17.8 % — HIGH (ref 10.3–14.5)
RBC # FLD: 18.3 % — HIGH (ref 10.3–14.5)
RH IG SCN BLD-IMP: POSITIVE — SIGNIFICANT CHANGE UP
S AUREUS DNA NOSE QL NAA+PROBE: DETECTED
SARS-COV-2 RNA SPEC QL NAA+PROBE: SIGNIFICANT CHANGE UP
SODIUM SERPL-SCNC: 134 MMOL/L — LOW (ref 135–145)
SODIUM SERPL-SCNC: 137 MMOL/L — SIGNIFICANT CHANGE UP (ref 135–145)
SP GR SPEC: 1.01 — SIGNIFICANT CHANGE UP (ref 1.01–1.02)
SPECIMEN SOURCE: SIGNIFICANT CHANGE UP
TACROLIMUS SERPL-MCNC: 10.3 NG/ML — SIGNIFICANT CHANGE UP
UROBILINOGEN FLD QL: NEGATIVE — SIGNIFICANT CHANGE UP
WBC # BLD: 5.22 K/UL — SIGNIFICANT CHANGE UP (ref 3.8–10.5)
WBC # BLD: 5.58 K/UL — SIGNIFICANT CHANGE UP (ref 3.8–10.5)
WBC # FLD AUTO: 5.22 K/UL — SIGNIFICANT CHANGE UP (ref 3.8–10.5)
WBC # FLD AUTO: 5.58 K/UL — SIGNIFICANT CHANGE UP (ref 3.8–10.5)

## 2020-07-10 PROCEDURE — 99233 SBSQ HOSP IP/OBS HIGH 50: CPT | Mod: GC

## 2020-07-10 PROCEDURE — 71045 X-RAY EXAM CHEST 1 VIEW: CPT | Mod: 26

## 2020-07-10 PROCEDURE — 99233 SBSQ HOSP IP/OBS HIGH 50: CPT

## 2020-07-10 RX ORDER — FUROSEMIDE 40 MG
40 TABLET ORAL ONCE
Refills: 0 | Status: COMPLETED | OUTPATIENT
Start: 2020-07-10 | End: 2020-07-10

## 2020-07-10 RX ORDER — FUROSEMIDE 40 MG
80 TABLET ORAL ONCE
Refills: 0 | Status: DISCONTINUED | OUTPATIENT
Start: 2020-07-10 | End: 2020-07-10

## 2020-07-10 RX ORDER — MAGNESIUM SULFATE 500 MG/ML
2 VIAL (ML) INJECTION ONCE
Refills: 0 | Status: COMPLETED | OUTPATIENT
Start: 2020-07-10 | End: 2020-07-10

## 2020-07-10 RX ORDER — LINEZOLID 600 MG/300ML
600 INJECTION, SOLUTION INTRAVENOUS EVERY 12 HOURS
Refills: 0 | Status: DISCONTINUED | OUTPATIENT
Start: 2020-07-10 | End: 2020-07-22

## 2020-07-10 RX ORDER — FUROSEMIDE 40 MG
40 TABLET ORAL
Refills: 0 | Status: DISCONTINUED | OUTPATIENT
Start: 2020-07-10 | End: 2020-07-10

## 2020-07-10 RX ORDER — TACROLIMUS 5 MG/1
2.5 CAPSULE ORAL
Refills: 0 | Status: COMPLETED | OUTPATIENT
Start: 2020-07-10 | End: 2020-07-10

## 2020-07-10 RX ADMIN — Medication 1 TABLET(S): at 12:43

## 2020-07-10 RX ADMIN — Medication 100000 UNIT(S): at 17:10

## 2020-07-10 RX ADMIN — TACROLIMUS 2.5 MILLIGRAM(S): 5 CAPSULE ORAL at 20:36

## 2020-07-10 RX ADMIN — Medication 1 APPLICATION(S): at 12:41

## 2020-07-10 RX ADMIN — Medication 100000 UNIT(S): at 12:42

## 2020-07-10 RX ADMIN — Medication 40 MILLIGRAM(S): at 18:04

## 2020-07-10 RX ADMIN — NYSTATIN CREAM 1 APPLICATION(S): 100000 CREAM TOPICAL at 06:56

## 2020-07-10 RX ADMIN — TAMSULOSIN HYDROCHLORIDE 0.4 MILLIGRAM(S): 0.4 CAPSULE ORAL at 21:16

## 2020-07-10 RX ADMIN — Medication 100000 UNIT(S): at 06:55

## 2020-07-10 RX ADMIN — Medication 125 MILLIMOLE(S): at 08:44

## 2020-07-10 RX ADMIN — INSULIN GLARGINE 12 UNIT(S): 100 INJECTION, SOLUTION SUBCUTANEOUS at 21:16

## 2020-07-10 RX ADMIN — Medication 40 MILLIGRAM(S): at 17:10

## 2020-07-10 RX ADMIN — LINEZOLID 300 MILLIGRAM(S): 600 INJECTION, SOLUTION INTRAVENOUS at 22:21

## 2020-07-10 RX ADMIN — NYSTATIN CREAM 1 APPLICATION(S): 100000 CREAM TOPICAL at 17:11

## 2020-07-10 RX ADMIN — MYCOPHENOLATE MOFETIL 500 MILLIGRAM(S): 250 CAPSULE ORAL at 08:46

## 2020-07-10 RX ADMIN — CHLORHEXIDINE GLUCONATE 1 APPLICATION(S): 213 SOLUTION TOPICAL at 06:55

## 2020-07-10 RX ADMIN — Medication 100000 UNIT(S): at 00:00

## 2020-07-10 RX ADMIN — QUETIAPINE FUMARATE 25 MILLIGRAM(S): 200 TABLET, FILM COATED ORAL at 21:16

## 2020-07-10 RX ADMIN — Medication 40 MILLIGRAM(S): at 09:12

## 2020-07-10 RX ADMIN — MEROPENEM 100 MILLIGRAM(S): 1 INJECTION INTRAVENOUS at 14:04

## 2020-07-10 RX ADMIN — Medication 5 UNIT(S): at 12:41

## 2020-07-10 RX ADMIN — Medication 81 MILLIGRAM(S): at 12:42

## 2020-07-10 RX ADMIN — MEROPENEM 100 MILLIGRAM(S): 1 INJECTION INTRAVENOUS at 21:16

## 2020-07-10 RX ADMIN — SODIUM CHLORIDE 5 MILLILITER(S): 9 INJECTION INTRAMUSCULAR; INTRAVENOUS; SUBCUTANEOUS at 06:00

## 2020-07-10 RX ADMIN — VALGANCICLOVIR 450 MILLIGRAM(S): 450 TABLET, FILM COATED ORAL at 12:43

## 2020-07-10 RX ADMIN — Medication 5 UNIT(S): at 08:32

## 2020-07-10 RX ADMIN — Medication 50 GRAM(S): at 08:44

## 2020-07-10 RX ADMIN — PANTOPRAZOLE SODIUM 40 MILLIGRAM(S): 20 TABLET, DELAYED RELEASE ORAL at 06:55

## 2020-07-10 RX ADMIN — MEROPENEM 100 MILLIGRAM(S): 1 INJECTION INTRAVENOUS at 06:55

## 2020-07-10 RX ADMIN — Medication 5 UNIT(S): at 17:11

## 2020-07-10 RX ADMIN — PANTOPRAZOLE SODIUM 40 MILLIGRAM(S): 20 TABLET, DELAYED RELEASE ORAL at 17:11

## 2020-07-10 NOTE — PROGRESS NOTE ADULT - SUBJECTIVE AND OBJECTIVE BOX
Chief Complaint:  Patient is a 64y old  Male who presents with a chief complaint of Liver failure, hepatic encephalopathy (10 Jul 2020 10:16)      Interval Events: Pt agitated and received haldol. This AM, denies abdominal pain, n/v. No visual hallucinations. Uncomofrtable in chair, prefers to be in bed.    Allergies:  codeine (Anaphylaxis)      Hospital Medications:  aspirin enteric coated 81 milliGRAM(s) Oral daily  chlorhexidine 2% Cloths 1 Application(s) Topical <User Schedule>  collagenase Ointment 1 Application(s) Topical daily  furosemide    Tablet 40 milliGRAM(s) Oral <User Schedule>  glucagon  Injectable 1 milliGRAM(s) IntraMuscular once PRN  insulin glargine Injectable (LANTUS) 12 Unit(s) SubCutaneous at bedtime  insulin lispro (HumaLOG) corrective regimen sliding scale   SubCutaneous three times a day before meals  insulin lispro (HumaLOG) corrective regimen sliding scale   SubCutaneous at bedtime  insulin lispro Injectable (HumaLOG) 5 Unit(s) SubCutaneous three times a day before meals  magnesium oxide 400 milliGRAM(s) Oral two times a day with meals  meropenem  IVPB 1000 milliGRAM(s) IV Intermittent every 8 hours  mycophenolate mofetil 500 milliGRAM(s) Oral <User Schedule>  NIFEdipine XL 30 milliGRAM(s) Oral daily  nystatin    Suspension 251428 Unit(s) Swish and Swallow four times a day  nystatin Cream 1 Application(s) Topical two times a day  pantoprazole  Injectable 40 milliGRAM(s) IV Push every 12 hours  polyethylene glycol 3350 17 Gram(s) Oral daily  predniSONE   Tablet 5 milliGRAM(s) Oral daily  QUEtiapine 25 milliGRAM(s) Oral at bedtime  senna 2 Tablet(s) Oral at bedtime  tacrolimus 2.5 milliGRAM(s) Oral <User Schedule>  tamsulosin 0.4 milliGRAM(s) Oral at bedtime  trimethoprim   80 mG/sulfamethoxazole 400 mG 1 Tablet(s) Oral daily  valGANciclovir 450 milliGRAM(s) Oral daily      PMHX/PSHX:  GIB (gastrointestinal bleeding)  GERD with esophagitis  Hepatic encephalopathy  Obesity  Fatty liver disease, nonalcoholic  Renal stones  Hypertension  Neuropathy  Hypercholesteremia  Diabetes  S/P cholecystectomy  No significant past surgical history      Family history:  Family history of type 2 diabetes mellitus  Family history of hypertension  Family history of stomach cancer  No pertinent family history in first degree relatives      ROS: As per HPI, 14-point ROS negative otherwise.        PHYSICAL EXAM:     Vital Signs:  Vital Signs Last 24 Hrs  T(C): 36.4 (10 Jul 2020 11:00), Max: 36.8 (2020 15:00)  T(F): 97.5 (10 Jul 2020 11:00), Max: 98.2 (2020 15:00)  HR: 79 (10 Jul 2020 13:00) (70 - 80)  BP: 128/60 (10 Jul 2020 13:00) (93/56 - 151/68)  BP(mean): 87 (10 Jul 2020 13:00) (64 - 98)  RR: 25 (10 Jul 2020 13:00) (12 - 40)  SpO2: 100% (10 Jul 2020 13:) (66% - 100%)  Daily     Daily Weight in k.2 (10 Jul 2020 00:34)    GENERAL: no acute distress  NEURO: alert, AAOx1, +tremors, answers questions but appears fatigued and drowsy  HEENT: anicteric sclera, no conjunctival pallor appreciated  CHEST: no respiratory distress, no accessory muscle use  CARDIAC: regular rate, rhythm  ABDOMEN: soft, incisions intact, ANDREINA drains in place w/ serosanguinous drainage  EXTREMITIES: warm, well perfused, trace/1+ BLLE edema  SKIN: R heel with small open wound with overlying dressing    LABS:                        10.3   5.22  )-----------( 68       ( 10 Jul 2020 06:45 )             30.7     07-10    137  |  102  |  26<H>  ----------------------------<  133<H>  4.4   |  28  |  1.00    Ca    8.6      10 Jul 2020 06:45  Phos  2.7     07-10  Mg     1.8     07-10    TPro  4.8<L>  /  Alb  2.8<L>  /  TBili  1.6<H>  /  DBili  0.7<H>  /  AST  31  /  ALT  90<H>  /  AlkPhos  106  07-10    LIVER FUNCTIONS - ( 10 Jul 2020 06:45 )  Alb: 2.8 g/dL / Pro: 4.8 g/dL / ALK PHOS: 106 U/L / ALT: 90 U/L / AST: 31 U/L / GGT: x           PT/INR - ( 10 Jul 2020 06:45 )   PT: 13.6 sec;   INR: 1.20 ratio         PTT - ( 10 Jul 2020 06:45 )  PTT:25.4 sec  Urinalysis Basic - ( 10 Jul 2020 03:23 )    Color: Light Yellow / Appearance: Clear / S.011 / pH: x  Gluc: x / Ketone: Negative  / Bili: Negative / Urobili: Negative   Blood: x / Protein: Negative / Nitrite: Negative   Leuk Esterase: Negative / RBC: 2 /hpf / WBC 1 /HPF   Sq Epi: x / Non Sq Epi: 0 /hpf / Bacteria: 0.0      Amylase Serum--      Lipase serum--       [id number]      Imaging:    < from: Xray Chest 1 View- PORTABLE-Routine (07.10.20 @ 07:26) >    IMPRESSION:    The mediastinal cardiac silhouette is unremarkable.    Previously seen right lower lobe opacity has improved with only a small linear marking remaining, likely atelectasis. Clear left lung.    No acute osseous finding.     < end of copied text > Chief Complaint:  Patient is a 64y old  Male who presents with a chief complaint of Liver failure, hepatic encephalopathy (10 Jul 2020 10:16)      Interval Events: Hypoxemic yesterday. CXR obtained with new RLL infiltrate. Meropenem restarted. Pt agitated and received haldol. This AM, denies abdominal pain, n/v. No visual hallucinations. Uncomfortable in chair, prefers to be in bed.    Allergies:  codeine (Anaphylaxis)      Hospital Medications:  aspirin enteric coated 81 milliGRAM(s) Oral daily  chlorhexidine 2% Cloths 1 Application(s) Topical <User Schedule>  collagenase Ointment 1 Application(s) Topical daily  furosemide    Tablet 40 milliGRAM(s) Oral <User Schedule>  glucagon  Injectable 1 milliGRAM(s) IntraMuscular once PRN  insulin glargine Injectable (LANTUS) 12 Unit(s) SubCutaneous at bedtime  insulin lispro (HumaLOG) corrective regimen sliding scale   SubCutaneous three times a day before meals  insulin lispro (HumaLOG) corrective regimen sliding scale   SubCutaneous at bedtime  insulin lispro Injectable (HumaLOG) 5 Unit(s) SubCutaneous three times a day before meals  magnesium oxide 400 milliGRAM(s) Oral two times a day with meals  meropenem  IVPB 1000 milliGRAM(s) IV Intermittent every 8 hours  mycophenolate mofetil 500 milliGRAM(s) Oral <User Schedule>  NIFEdipine XL 30 milliGRAM(s) Oral daily  nystatin    Suspension 688516 Unit(s) Swish and Swallow four times a day  nystatin Cream 1 Application(s) Topical two times a day  pantoprazole  Injectable 40 milliGRAM(s) IV Push every 12 hours  polyethylene glycol 3350 17 Gram(s) Oral daily  predniSONE   Tablet 5 milliGRAM(s) Oral daily  QUEtiapine 25 milliGRAM(s) Oral at bedtime  senna 2 Tablet(s) Oral at bedtime  tacrolimus 2.5 milliGRAM(s) Oral <User Schedule>  tamsulosin 0.4 milliGRAM(s) Oral at bedtime  trimethoprim   80 mG/sulfamethoxazole 400 mG 1 Tablet(s) Oral daily  valGANciclovir 450 milliGRAM(s) Oral daily      PMHX/PSHX:  GIB (gastrointestinal bleeding)  GERD with esophagitis  Hepatic encephalopathy  Obesity  Fatty liver disease, nonalcoholic  Renal stones  Hypertension  Neuropathy  Hypercholesteremia  Diabetes  S/P cholecystectomy  No significant past surgical history      Family history:  Family history of type 2 diabetes mellitus  Family history of hypertension  Family history of stomach cancer  No pertinent family history in first degree relatives      ROS: As per HPI, 14-point ROS negative otherwise.        PHYSICAL EXAM:     Vital Signs:  Vital Signs Last 24 Hrs  T(C): 36.4 (10 Jul 2020 11:00), Max: 36.8 (2020 15:00)  T(F): 97.5 (10 Jul 2020 11:00), Max: 98.2 (2020 15:00)  HR: 79 (10 Jul 2020 13:00) (70 - 80)  BP: 128/60 (10 Jul 2020 13:00) (93/56 - 151/68)  BP(mean): 87 (10 Jul 2020 13:00) (64 - 98)  RR: 25 (10 Jul 2020 13:00) (12 - 40)  SpO2: 100% (10 Jul 2020 13:00) (66% - 100%)  Daily     Daily Weight in k.2 (10 Jul 2020 00:34)    GENERAL: no acute distress  NEURO: alert, AAOx1, +tremors, answers questions but appears fatigued and drowsy  HEENT: anicteric sclera, no conjunctival pallor appreciated  CHEST: no respiratory distress, no accessory muscle use  CARDIAC: regular rate, rhythm  ABDOMEN: soft, incisions intact, ANDREINA drains in place w/ serosanguinous drainage  EXTREMITIES: warm, well perfused, trace/1+ BLLE edema  SKIN: R heel with small open wound with overlying dressing    LABS:                        10.3   5.22  )-----------( 68       ( 10 Jul 2020 06:45 )             30.7     07-10    137  |  102  |  26<H>  ----------------------------<  133<H>  4.4   |  28  |  1.00    Ca    8.6      10 Jul 2020 06:45  Phos  2.7     07-10  Mg     1.8     07-10    TPro  4.8<L>  /  Alb  2.8<L>  /  TBili  1.6<H>  /  DBili  0.7<H>  /  AST  31  /  ALT  90<H>  /  AlkPhos  106  07-10    LIVER FUNCTIONS - ( 10 Jul 2020 06:45 )  Alb: 2.8 g/dL / Pro: 4.8 g/dL / ALK PHOS: 106 U/L / ALT: 90 U/L / AST: 31 U/L / GGT: x           PT/INR - ( 10 Jul 2020 06:45 )   PT: 13.6 sec;   INR: 1.20 ratio         PTT - ( 10 Jul 2020 06:45 )  PTT:25.4 sec  Urinalysis Basic - ( 10 Jul 2020 03:23 )    Color: Light Yellow / Appearance: Clear / S.011 / pH: x  Gluc: x / Ketone: Negative  / Bili: Negative / Urobili: Negative   Blood: x / Protein: Negative / Nitrite: Negative   Leuk Esterase: Negative / RBC: 2 /hpf / WBC 1 /HPF   Sq Epi: x / Non Sq Epi: 0 /hpf / Bacteria: 0.0      Amylase Serum--      Lipase serum--       [id number]      Imaging:    < from: Xray Chest 1 View- PORTABLE-Routine (07.10.20 @ 07:26) >    IMPRESSION:    The mediastinal cardiac silhouette is unremarkable.    Previously seen right lower lobe opacity has improved with only a small linear marking remaining, likely atelectasis. Clear left lung.    No acute osseous finding.     < end of copied text >

## 2020-07-10 NOTE — PROGRESS NOTE ADULT - ATTENDING COMMENTS
on CXR yesterday had an area of RLL suspicious for effusion vs consolidation. had two episodes of hypoxia requiring O2. given this, COVID was tested which was negative. Spx culture ordered and after consultation with transplant, meropenem was restarted  procal 1.1, no leukocytosis  pt still delirious but did not require more medications to sleep last night  LFTs within range of yesterday  ANDREINA drain increased in output  AM CXR appears better  was -2 liter for balance yesterday    - discuss with transplant and ID appropriateness and course of meropenem  - follow up on Spx  - AM CXR tomorrow  - wean O2 as tolerated  - transplant meds per transplant  - diuresis per transplant  - will work with PT today as well

## 2020-07-10 NOTE — PROGRESS NOTE ADULT - SUBJECTIVE AND OBJECTIVE BOX
Patient is a 64y old  Male who presents with a chief complaint of Liver failure, hepatic encephalopathy (10 Jul 2020 16:54)       INTERVAL HPI/OVERNIGHT EVENTS:  Patient seen and evaluated at bedside.  Pt is resting comfortable in NAD. Denies N/V/F/C.    Allergies    codeine (Anaphylaxis)    Intolerances        Vital Signs Last 24 Hrs  T(C): 36.7 (10 Jul 2020 15:00), Max: 36.7 (2020 23:00)  T(F): 98.1 (10 Jul 2020 15:00), Max: 98.1 (2020 23:00)  HR: 77 (10 Jul 2020 18:00) (70 - 80)  BP: 126/61 (10 Jul 2020 18:00) (93/56 - 151/68)  BP(mean): 85 (10 Jul 2020 18:00) (64 - 98)  RR: 17 (10 Jul 2020 18:00) (5 - 40)  SpO2: 99% (10 Jul 2020 18:00) (66% - 100%)    LABS:                        10.3   5.22  )-----------( 68       ( 10 Jul 2020 06:45 )             30.7     07-10    137  |  102  |  26<H>  ----------------------------<  133<H>  4.4   |  28  |  1.00    Ca    8.6      10 Jul 2020 06:45  Phos  2.7     07-10  Mg     1.8     07-10    TPro  4.8<L>  /  Alb  2.8<L>  /  TBili  1.6<H>  /  DBili  0.7<H>  /  AST  31  /  ALT  90<H>  /  AlkPhos  106  07-10    PT/INR - ( 10 Jul 2020 06:45 )   PT: 13.6 sec;   INR: 1.20 ratio         PTT - ( 10 Jul 2020 06:45 )  PTT:25.4 sec  Urinalysis Basic - ( 10 Jul 2020 03:23 )    Color: Light Yellow / Appearance: Clear / S.011 / pH: x  Gluc: x / Ketone: Negative  / Bili: Negative / Urobili: Negative   Blood: x / Protein: Negative / Nitrite: Negative   Leuk Esterase: Negative / RBC: 2 /hpf / WBC 1 /HPF   Sq Epi: x / Non Sq Epi: 0 /hpf / Bacteria: 0.0      CAPILLARY BLOOD GLUCOSE      POCT Blood Glucose.: 146 mg/dL (10 Jul 2020 16:51)  POCT Blood Glucose.: 130 mg/dL (10 Jul 2020 12:38)  POCT Blood Glucose.: 123 mg/dL (10 Jul 2020 08:28)  POCT Blood Glucose.: 189 mg/dL (2020 22:14)  POCT Blood Glucose.: 170 mg/dL (2020 20:22)      Lower Extremity Physical Exam:  Vascular: DP 2/4 PT 1/4 B/L, CFT <3 seconds B/L, Temperature gradient warm to cool B/L  Neuro: Epicritic sensation diminished to the level of heel B/L  Musculoskeletal/Ortho: no gross deformities  Skin:   Wound #1: right foot  Location: posterior heel  Size: 6 x 4 cm  Depth: subQ  Wound bed: fibrogranular tissue   Drainage: none  Odor: none  Periwound: no clinical signs of infection  Etiology: pressure, diabetes

## 2020-07-10 NOTE — PROGRESS NOTE ADULT - ASSESSMENT
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    OLTx  - Good graft function, LFTs trending down  - Delirium ?Steroid psychosis; vs tacro sensitivity?, continue 1:1, cont Seroquel 25mg qhs.  - Immuno: FK level, MMF 1g bid, Pred taper, holding prograf today   - PPX: valcyte/nystatin/bactrim  - Continue ASA 81mg daily  - LE edema: Lasix 40mg PO x2 today  - ID: restarted on mari for concern for RLL infiltrate   - h/o UGI bleed: Continue Protonix 40mg IV bid    - Continue MgSo4 400mg BID, keep Mag >2  - Regular, carbohydrate restricted diet  - Pain control PRN  - DVT PPx: SCDs at all times      Hyperglycemia  - In setting of steroid taper  - Appreciate endocrinology recommendations  - Start Lantus and Humalog insulin wih SSI coverage  - Fingersticks ac and qhs    Hypertension  -Controlled  -Continue Nifedipine    -Heel ulcer  Continue local wound care w/ santyl and DSD as per podiatry    Continue ICU care

## 2020-07-10 NOTE — PROGRESS NOTE ADULT - SUBJECTIVE AND OBJECTIVE BOX
INTERVAL HPI/OVERNIGHT EVENTS:    events noted  agitated, received haldol   no visual hallucinations today        Allergies    codeine (Anaphylaxis)    Intolerances    General:  No wt loss, fevers, chills, night sweats, + fatigue,   Eyes:  Good vision, no reported pain  ENT:  No sore throat, pain, runny nose, dysphagia  CV:  No pain, palpitations, hypo/hypertension  Resp:  No dyspnea, cough, tachypnea, wheezing  GI:  No pain, No nausea, No vomiting, No diarrhea, No constipation, No weight loss, No fever, No pruritis, No rectal bleeding, No tarry stools, No dysphagia,  :  No pain, bleeding, incontinence, nocturia  Muscle:  No pain, weakness  Neuro:  +tremors, No weakness, tingling, memory problems  Psych:  No fatigue, insomnia, mood problems, depression  Endocrine:  No polyuria, polydipsia, cold/heat intolerance  Heme:  No petechiae, ecchymosis, easy bruisability  Skin:  No rash, tattoos, scars, edema    PHYSICAL EXAM:   Vital Signs:  Vital Signs Last 24 Hrs  T(C): 36.7 (14 Jun 2020 07:53), Max: 36.7 (14 Jun 2020 07:53)  T(F): 98.1 (14 Jun 2020 07:53), Max: 98.1 (14 Jun 2020 07:53)  HR: 79 (14 Jun 2020 07:53) (75 - 80)  BP: 127/77 (14 Jun 2020 07:53) (108/53 - 133/68)  BP(mean): --  RR: 18 (14 Jun 2020 07:53) (17 - 18)  SpO2: 97% (14 Jun 2020 07:53) (97% - 100%)  Daily     Daily I&O's Summary    13 Jun 2020 07:01  -  14 Jun 2020 07:00  --------------------------------------------------------  IN: 350 mL / OUT: 250 mL / NET: 100 mL    14 Jun 2020 07:01  -  14 Jun 2020 12:30  --------------------------------------------------------  IN: 0 mL / OUT: 350 mL / NET: -350 mL        GENERAL:  no distress  HEENT:  NC/AT   ABDOMEN:  Soft, non-tender, non-distended, normoactive bowel sounds  EXTEREMITIES:  + edema  SKIN:  No rash/erythema/ecchymoses/petechiae/wounds/abscess/warm/dry  NEURO:  alert, +asterixis      LABS:                        9.0    5.07  )-----------( 68       ( 14 Jun 2020 07:17 )             26.4     06-14    131<L>  |  100  |  27<H>  ----------------------------<  141<H>  5.1   |  20<L>  |  1.74<H>    Ca    10.5      14 Jun 2020 07:17    TPro  6.3  /  Alb  3.8  /  TBili  11.0<H>  /  DBili  x   /  AST  24  /  ALT  15  /  AlkPhos  133<H>  06-14    PT/INR - ( 14 Jun 2020 09:19 )   PT: 26.3 sec;   INR: 2.23 ratio         PTT - ( 14 Jun 2020 09:19 )  PTT:44.2 sec    amylase   lipase  RADIOLOGY & ADDITIONAL TESTS:

## 2020-07-10 NOTE — PROGRESS NOTE ADULT - ATTENDING COMMENTS
63 yo M with HLD, IDDM, GERD, HFpEF with mild LV diastolic dysfunction, chronic right foot ulcer, recurrent UTIs, and decompensated JEAN cirrhosis, s/p OLT 7/2 (standard criteria donor, standard vascular anastomoses with duct-to-duct biliary anastomosis over T-tube, CMV D+/R-, EBV D+/R+, received Simulect induction). Post-transplant course has been complicated by steroid-induced psychosis with hallucinations and delirium, tacrolimus neurotoxicity with tremors, anasarca / ascites, and new RLL pneumonia.    # S/p OLT, POD #8: Hepatic allograft is stable with improving liver tests. Continuing ASA for HAT prophylaxis.    # Immunosuppression: Continuing prednisone 5 mg po daily. Tacrolimus trough level 10.3 today but AM dose held due to concern for neurotoxicity. Transplant Surgery planning to re-check trough level tonight and then re-dose. Continuing magnesium supplementation for goal Mg >2. If mental status and tremulousness worsening despite being on only low-dose prednisone, then may need to switch to cyclosporine.     # Prophylaxis: Continuing Bactrim, Valcyte, and nystatin prophylaxis.    # RLL pneumonia: Re-started on meropenem (7/9- ). Transplant ID following. Concern for possible aspiration event in setting of delirium.    # Anasarca / ascites: Continuing diuresis with Lasix. Net negative 2.3L in past 24h but still with moderate ascites and BLE edema. Renal function stable. Will give additional 80 mg dose tonight.    # Chronic right heel ulcer: Does not appear infected. Continuing wound care.    # SHENG: Appears to be having apneic episodes while sleeping. Recommend Bipap nightly, will discuss with SICU.    # Debility: Daily PT.    Plan of care discussed in detail in multidisciplinary rounds with Transplant Surgery and SICU teams. Please don't hesitate to call with any questions/concerns.    Letitia Mo M.D., Ph.D.  Transplant Hepatology  Cell: [phone] 65 yo M with HLD, IDDM, GERD, HFpEF with mild LV diastolic dysfunction, chronic right foot ulcer, recurrent UTIs, and decompensated JEAN cirrhosis, s/p OLT 7/2 (standard criteria donor, standard vascular anastomoses with duct-to-duct biliary anastomosis over T-tube, CMV D+/R-, EBV D+/R+, received Simulect induction). Post-transplant course has been complicated by steroid-induced psychosis with hallucinations and delirium, tacrolimus neurotoxicity with tremors, anasarca / ascites, and new RLL pneumonia.    # S/p OLT, POD #8: Hepatic allograft is stable with improving liver tests. Continuing ASA for HAT prophylaxis.    # Immunosuppression: Continuing prednisone 5 mg po daily. Tacrolimus trough level 10.3 today but AM dose held due to concern for neurotoxicity. Transplant Surgery planning to re-check trough level tonight and then re-dose. Continuing magnesium supplementation for goal Mg >2. If mental status and tremulousness worsening despite being on only low-dose prednisone, then may need to switch to cyclosporine. MMF decreased to 500 mg po bid last night given pneumonia.    # Prophylaxis: Continuing Bactrim, Valcyte, and nystatin prophylaxis.    # RLL pneumonia: Re-started on meropenem (7/9- ). Transplant ID following. Concern for possible aspiration event in setting of delirium.    # Anasarca / ascites: Continuing diuresis with Lasix. Net negative 2.3L in past 24h but still with moderate ascites and BLE edema. Renal function stable. Will give additional 80 mg dose tonight.    # Chronic right heel ulcer: Does not appear infected. Continuing wound care.    # SHENG: Appears to be having apneic episodes while sleeping. Recommend Bipap nightly, will discuss with SICU.    # Debility: Daily PT.    Plan of care discussed in detail in multidisciplinary rounds with Transplant Surgery and SICU teams. Please don't hesitate to call with any questions/concerns.    Letitia Mo M.D., Ph.D.  Transplant Hepatology  Cell: [phone]

## 2020-07-10 NOTE — CHART NOTE - NSCHARTNOTEFT_GEN_A_CORE
Nutrition Follow Up Note     Patient seen for: nutrition follow up on ICU    Source: RN, medical record, communication with team. Pt asleep at time of visit; noted with 1:1 for agitiation    Chart reviewed, events noted. s/p liver transplant 7/2: live enzymes continue to downtrend    Diet Order: Diet, Consistent Carbohydrate w/Evening Snack (07-05-20 @ 10:55)    Nutrition Events:   - PO Intake: Pt is consuming % of meal trays with a hearty appetite  - Hyperglycemia in setting of steroid Rx, managed with Lantus (12 units bedtime) and Humalog corrective regimen  - Nutrition Education: unable to provide post transplant nutrition therapy education at this time, in setting of AMS/agitation. RD will follow up with pt or family when appropriate.    Last BM 7/10    Anthropometric Measurements:   Height (cm): 185 (07-01-20 @ 13:54), 185.42 (04-27-20 @ 14:53)  Weight (kg): 103 (07-01-20 @ 13:54), 117.9 (04-27-20 @ 14:53); 118.2 (07-10-20)  BMI (kg/m2): 30.1 (07-01-20 @ 13:54), 34.3 (04-27-20 @ 14:53)  IBW: 83.4 kg    Medications: MEDICATIONS  (STANDING):  aspirin enteric coated 81 milliGRAM(s) Oral daily  chlorhexidine 2% Cloths 1 Application(s) Topical <User Schedule>  collagenase Ointment 1 Application(s) Topical daily  furosemide    Tablet 40 milliGRAM(s) Oral <User Schedule>  insulin glargine Injectable (LANTUS) 12 Unit(s) SubCutaneous at bedtime  insulin lispro (HumaLOG) corrective regimen sliding scale   SubCutaneous three times a day before meals  insulin lispro (HumaLOG) corrective regimen sliding scale   SubCutaneous at bedtime  insulin lispro Injectable (HumaLOG) 5 Unit(s) SubCutaneous three times a day before meals  magnesium oxide 400 milliGRAM(s) Oral two times a day with meals  meropenem  IVPB 1000 milliGRAM(s) IV Intermittent every 8 hours  mycophenolate mofetil 500 milliGRAM(s) Oral <User Schedule>  NIFEdipine XL 30 milliGRAM(s) Oral daily  nystatin    Suspension 266907 Unit(s) Swish and Swallow four times a day  nystatin Cream 1 Application(s) Topical two times a day  pantoprazole  Injectable 40 milliGRAM(s) IV Push every 12 hours  polyethylene glycol 3350 17 Gram(s) Oral daily  predniSONE   Tablet 5 milliGRAM(s) Oral daily  QUEtiapine 25 milliGRAM(s) Oral at bedtime  senna 2 Tablet(s) Oral at bedtime  tacrolimus 2.5 milliGRAM(s) Oral <User Schedule>  tamsulosin 0.4 milliGRAM(s) Oral at bedtime  trimethoprim   80 mG/sulfamethoxazole 400 mG 1 Tablet(s) Oral daily  valGANciclovir 450 milliGRAM(s) Oral daily    MEDICATIONS  (PRN):  glucagon  Injectable 1 milliGRAM(s) IntraMuscular once PRN Glucose <70 milliGRAM(s)/deciLiter    Labs: 07-10 @ 06:45: Sodium 137, Potassium 4.4, Calcium 8.6, Magnesium 1.8, Phosphorus 2.7, BUN 26<H>, Creatinine 1.00, Glucose 133<H>, Alk Phos 106, ALT/SGPT 90<H>, AST/SGOT 31, Albumin 2.8<L>, Total Bilirubin 1.6<H>, Hemoglobin 10.3<L>, Hematocrit 30.7<L>, Creatine Kinase <<27>    HbA1c 5.2%    POCT Blood Glucose.: 130 mg/dL (07-10-20 @ 12:38)  POCT Blood Glucose.: 123 mg/dL (07-10-20 @ 08:28)  POCT Blood Glucose.: 189 mg/dL (07-09-20 @ 22:14)  POCT Blood Glucose.: 170 mg/dL (07-09-20 @ 20:22)  POCT Blood Glucose.: 187 mg/dL (07-09-20 @ 17:14)    Skin per nursing documentation: no pressure injuries documented  Edema: 2+ left/right leg    Estimated Needs: based on IBW 83.4 kg   Energy: 3016-7715 calories  (25-30 fahad/kg)  Protein: 100-117 gm (1.2-1.4 gm/kg)    Previous Nutrition Diagnosis: Increased Nutrient Needs  Nutrition Diagnosis is: ongoing, addressed with therapeutic diet and oral supplements    New Nutrition Diagnosis:  none    Recommended Interventions:   1. Continue Consistent Carbohydrate diet with snack  2. Continue Nectar Consistency Mighty Shake (200 calories, 6 gm protein) supplements  3. RD will reinforce post-transplant nutrition therapy and Consistent Carbohydrate diet education with patient and/or family at an appropriate time.    Monitoring and Evaluation:   Continue to monitor nutrition provision and tolerance, weights, labs, skin integrity.   RD remains available upon request and will follow up per protocol.    Myah Argueta MS RD CDN [facility]; Pager # [phone] Nutrition Follow Up Note     Patient seen for: nutrition follow up on 8ICU    Source: RN, medical record, communication with team. Pt asleep at time of visit; noted with 1:1 for agitation    Chart reviewed, events noted. s/p liver transplant 7/2: live enzymes continue to downtrend    Diet Order: Diet, Consistent Carbohydrate w/Evening Snack (07-05-20 @ 10:55)    Nutrition Events:   - PO Intake: Pt is consuming % of meal trays with a hearty appetite  - Hyperglycemia in setting of steroid Rx, managed with Lantus (12 units bedtime) and Humalog corrective regimen  - Nutrition Education: unable to provide post transplant nutrition therapy education at this time, in setting of AMS/agitation. RD will follow up with pt or family when appropriate.    Last BM 7/10    Anthropometric Measurements:   Height (cm): 185 (07-01-20 @ 13:54), 185.42 (04-27-20 @ 14:53)  Weight (kg): 103 (07-01-20 @ 13:54), 117.9 (04-27-20 @ 14:53); 118.2 (07-10-20)  BMI (kg/m2): 30.1 (07-01-20 @ 13:54), 34.3 (04-27-20 @ 14:53)  IBW: 83.4 kg    Medications: MEDICATIONS  (STANDING):  aspirin enteric coated 81 milliGRAM(s) Oral daily  chlorhexidine 2% Cloths 1 Application(s) Topical <User Schedule>  collagenase Ointment 1 Application(s) Topical daily  furosemide    Tablet 40 milliGRAM(s) Oral <User Schedule>  insulin glargine Injectable (LANTUS) 12 Unit(s) SubCutaneous at bedtime  insulin lispro (HumaLOG) corrective regimen sliding scale   SubCutaneous three times a day before meals  insulin lispro (HumaLOG) corrective regimen sliding scale   SubCutaneous at bedtime  insulin lispro Injectable (HumaLOG) 5 Unit(s) SubCutaneous three times a day before meals  magnesium oxide 400 milliGRAM(s) Oral two times a day with meals  meropenem  IVPB 1000 milliGRAM(s) IV Intermittent every 8 hours  mycophenolate mofetil 500 milliGRAM(s) Oral <User Schedule>  NIFEdipine XL 30 milliGRAM(s) Oral daily  nystatin    Suspension 553356 Unit(s) Swish and Swallow four times a day  nystatin Cream 1 Application(s) Topical two times a day  pantoprazole  Injectable 40 milliGRAM(s) IV Push every 12 hours  polyethylene glycol 3350 17 Gram(s) Oral daily  predniSONE   Tablet 5 milliGRAM(s) Oral daily  QUEtiapine 25 milliGRAM(s) Oral at bedtime  senna 2 Tablet(s) Oral at bedtime  tacrolimus 2.5 milliGRAM(s) Oral <User Schedule>  tamsulosin 0.4 milliGRAM(s) Oral at bedtime  trimethoprim   80 mG/sulfamethoxazole 400 mG 1 Tablet(s) Oral daily  valGANciclovir 450 milliGRAM(s) Oral daily    MEDICATIONS  (PRN):  glucagon  Injectable 1 milliGRAM(s) IntraMuscular once PRN Glucose <70 milliGRAM(s)/deciLiter    Labs: 07-10 @ 06:45: Sodium 137, Potassium 4.4, Calcium 8.6, Magnesium 1.8, Phosphorus 2.7, BUN 26<H>, Creatinine 1.00, Glucose 133<H>, Alk Phos 106, ALT/SGPT 90<H>, AST/SGOT 31, Albumin 2.8<L>, Total Bilirubin 1.6<H>, Hemoglobin 10.3<L>, Hematocrit 30.7<L>, Creatine Kinase <<27>    HbA1c 5.2%    POCT Blood Glucose.: 130 mg/dL (07-10-20 @ 12:38)  POCT Blood Glucose.: 123 mg/dL (07-10-20 @ 08:28)  POCT Blood Glucose.: 189 mg/dL (07-09-20 @ 22:14)  POCT Blood Glucose.: 170 mg/dL (07-09-20 @ 20:22)  POCT Blood Glucose.: 187 mg/dL (07-09-20 @ 17:14)    Skin per nursing documentation: no pressure injuries documented  Edema: 2+ left/right leg    Estimated Needs: based on IBW 83.4 kg   Energy: 5597-3101 calories  (25-30 fahad/kg)  Protein: 100-117 gm (1.2-1.4 gm/kg)    Previous Nutrition Diagnosis: Increased Nutrient Needs  Nutrition Diagnosis is: ongoing, addressed with therapeutic diet and oral supplements    New Nutrition Diagnosis:  none    Recommended Interventions:   1. Continue Consistent Carbohydrate diet with snack  2. Continue Nectar Consistency Mighty Shake (200 calories, 6 gm protein) supplements  3. RD will reinforce post-transplant nutrition therapy and Consistent Carbohydrate diet education with patient and/or family at an appropriate time.    Monitoring and Evaluation:   Continue to monitor nutrition provision and tolerance, weights, labs, skin integrity.   RD remains available upon request and will follow up per protocol.    Myah Argueta MS RD CDN [facility]; Pager # [phone]

## 2020-07-10 NOTE — PROGRESS NOTE ADULT - ATTENDING COMMENTS
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 s/p treatment course for possible VRE UTI. Now s/p OLT.     Had pancytopenia and required Filgrastim on 6/30  s/p OLT on 7/2/20    Noted to have some drainage from right heel so danilo-op Dapto and Lachelle were extended  No evidence of skin/soft tissue infection on exam today  No drainage, erythema or underlying fluctuance was appreciated at the right heel  MRI prior to transplant without obvious infection    Now with worsening encephalopathy  Also with new hypoxia  U/A without pyuria  COVID19 PCR Negative  CXR with RLL Infiltrate  ?Aspiration PNA    Blood cultures now with VRE   Favor coverage with Linezolid for now as he received dapto perioperatively  Will need CT C/A/P (with IV contrast if permissible) to evaluate for source (CT Chest to evaluate RLL CXR infiltrate)  Repeat 2x blood cultures   FOllowup on VRE sensies  Continue Meropenem for now pending CT Chest    I will be away over this upcoming weekend. Please contact the Infectious Diseases Office with any further questions or concerns.     Eusebio Pérez M.D.  [facility] Division of Infectious Disease  8AM-5PM: Pager Number [phone]  After Hours (or if no response): Please contact the Infectious Diseases Office at [phone]     The above assessment and plan were discussed with Dr Garcia (transplant surgery) and HEATHER ALDRIDGE

## 2020-07-10 NOTE — PROGRESS NOTE ADULT - ASSESSMENT
64M w/ R posterior heel wound, stable  -Pt seen and evaluated  -R posterior heel wound to subQ stable w/ no signs of infection  -XR negative for gas or OM  -No podiatric surgical intervention  -Recommend local wound care w/ santyl and DSD  -Rx Santyl ointment   -Offload feet at all times while in bed (Z flow boots or pillows underneath legs)  -Follow up w/ Dr. Wang at [facility] ([address], [address]) within 1 week of discharge. Call [phone] for appointment.

## 2020-07-10 NOTE — PROGRESS NOTE ADULT - ASSESSMENT
ASSESSMENT:    The patient is a 64 year old male with PMHx of insulin dependent diabetes mellitus, hyperlipidemia, obesity, heart failure with preserved ejection fraction with mild LV diastolic dysfunction, MGUS, chronic anemia, duodenal ulcer, GAVE,  duodenal AVM s/p APC (last on 10/11/19) who presented with 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 The patient has a history of multiple recent hospitalizations due to UTIs, emphysematous cystitis and prostate abscess. The patient was re-admitted with hepatic encephalopathy and VRE UTI, also with MISA on CKD. S/p liver transplant 7/2/20.   Patient's CXR showed right sided infiltrate with suspected aspiration pneumonia.     COVID19 PCR negative  U/A 7/10 with no pyuria  Procalcitonin minimally elevated at 1.1  However, 7/10 blood culture with 1/4 VRE based on [employer]   Previous Dapto LEONARDO of 4 and patient received Dapto danilo-op - favor coverage with Linezolid for now   Unclear source at this point; will need imaging to evaluate source - urine without pyuria and right heel does not look conspicuous     RECOMMENDATIONS    #VRE Bacteremia  Recommend Linezolid 600 mg IV Q12H  Recommend 2x repeat blood cultures  Recommend CT A/P (with IV contrast if permissible) to evaluate for source. Can add on CT Chest to evaluate RLL infiltrate on CXR    #Right lower lobe pneumonia, Hypoxic Respiratory Failure, Encephalopathy  Continue meropenem for now; add CT Chest to see if RLL CXR infiltrate represents atelectasis or true consolidation    #s/p OLT   Continue bactrim, valcyte for prophylaxis   Monitor patient for tremors - monitor tacrolimus dose   Off antibiotics   Removal of drains and lines as per surgical transplant team    #Right heel wound  Podiatry on board    Local wound care   SICU, transplant, and hepatology on board     Charlie Lafleur MD PGY-4   Fellow, Infectious Diseases   Pager: [phone]  If no response, after 5pm and on weekends: Call [phone]

## 2020-07-10 NOTE — PROGRESS NOTE ADULT - ATTENDING COMMENTS
POD#8 s/p OLT, complicated by steroid-induced psychosis with hallucinations and delirium, tacrolimus neurotoxicity with tremors, anasarca / ascites, and new RLL pneumonia.  Patient seen this morning, sitting in chair eating breakfast without assistance.  hallucinations improving, but still agitated and has tremors  morning dose of tacrolimus held.  Cont current immunosuppression with Prograf 3.5 mg bid, Prednisone 5 mg daily, Cellcept 500mg bid.   Will check tacrolimus level and adjust dose accordingly.  Transplant Prophylaxis with nystatin, bactrim, valcyte  PPI due to steroids  Abx as per ID for pneumonia. currently on merrem. f/u cultures

## 2020-07-10 NOTE — PROGRESS NOTE ADULT - SUBJECTIVE AND OBJECTIVE BOX
Transplant Surgery Progress Note    Transplant Surgery - Multidisciplinary Rounds  --------------------------------------------------------------  OLTx      Date:  7/2/2020       POD#7    Present: Patient seen with multidisciplinary team including Transplant Surgeon: Dr. Garcia,  Transplant Pharmacist Ancelmo chen, ZBIGNIEW Akers, and surgical resident Nael Quezada during am rounds and examined with Dr. Garcia. Disciplines not in attendance will be notified of the plan.     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     Re-admitted on 6/11:   ·	worsening HE  ·	UTI/VRE: tx with linezolid 6/15-22, bactrim DS 6/22 - 7/2  ·	MISA/Hyponatremia  ·	UGI bleed (melena) s/p EGD (6/22) c/w hemorrhagic duodenitis/gastritis; Grade 2 EV; requiring multiple transfusions  ·	Known h/o R foot ulcer (MRI neg for OM)  ·	s/p OLTx on 7/2/2020, post op liver doppler with patent vessels  ·	post op course c/b delirium     OR details:  - L groin cutdown for vv bypass   - anastomosis: bicaval end-end/CBD duct-duct/HA & PV end-end, all standard anatomy.    - IOC with good flow  - Simulect induction. 500mg Solumedrol  - JPs x3 with T-Tube  - 14U pRBC, 14U FFP, 10 PLT, 11 CRYO, 500mL returned from cell saver, EBL 5L, UOP 1100mL    Recipient Info: ABO: A  CMV:  Neg  EBV Positive    Donor:  Donor ID:  [id number] Match: [id number]  Age: 29 ABO:  A1 High Risk:   No COD: Cardiovascular  Anti CMV positive, EBV IgG- positive  HepBcAb-neg, Hepatitis C-DANA- neg, Hepatitis C ab-neg    Interval events:  - POD 7 s/p OLT with good graft function: LFTs downtrending; INR 1.2, T bili 1.6  - Remains delirious/agitated requiring Haldol this am, on Seroquel last night, intermittently following commands, on 1:1  - Having UE tremors    - H/H stable: 10/30  - holding prograf today     Potential discharge date: pending clinical improvement    Education: Medication    Plan of Care: See belowO:  Vital Signs Last 24 Hrs  T(C): 36.3 (10 Jul 2020 08:00), Max: 36.8 (09 Jul 2020 11:00)  T(F): 97.3 (10 Jul 2020 08:00), Max: 98.2 (09 Jul 2020 11:00)  HR: 76 (10 Jul 2020 10:00) (70 - 80)  BP: 97/61 (10 Jul 2020 10:00) (93/56 - 151/68)  BP(mean): 75 (10 Jul 2020 10:00) (64 - 98)  RR: 28 (10 Jul 2020 10:00) (12 - 40)  SpO2: 97% (10 Jul 2020 10:00) (66% - 100%)    I&O's Detail    09 Jul 2020 07:01  -  10 Jul 2020 07:00  --------------------------------------------------------  IN:    Oral Fluid: 1295 mL  Total IN: 1295 mL    OUT:    Drain: 480 mL    Incontinent per Condom Catheter: 3125 mL  Total OUT: 3605 mL    Total NET: -2310 mL      10 Jul 2020 07:01  -  10 Jul 2020 10:17  --------------------------------------------------------  IN:    Solution: 50 mL  Total IN: 50 mL    OUT:    Drain: 70 mL  Total OUT: 70 mL    Total NET: -20 mL          MEDICATIONS  (STANDING):  aspirin enteric coated 81 milliGRAM(s) Oral daily  chlorhexidine 2% Cloths 1 Application(s) Topical <User Schedule>  collagenase Ointment 1 Application(s) Topical daily  furosemide    Tablet 40 milliGRAM(s) Oral <User Schedule>  insulin glargine Injectable (LANTUS) 12 Unit(s) SubCutaneous at bedtime  insulin lispro (HumaLOG) corrective regimen sliding scale   SubCutaneous three times a day before meals  insulin lispro (HumaLOG) corrective regimen sliding scale   SubCutaneous at bedtime  insulin lispro Injectable (HumaLOG) 5 Unit(s) SubCutaneous three times a day before meals  magnesium oxide 400 milliGRAM(s) Oral two times a day with meals  meropenem  IVPB 1000 milliGRAM(s) IV Intermittent every 8 hours  mycophenolate mofetil 500 milliGRAM(s) Oral <User Schedule>  NIFEdipine XL 30 milliGRAM(s) Oral daily  nystatin    Suspension 587055 Unit(s) Swish and Swallow four times a day  nystatin Cream 1 Application(s) Topical two times a day  pantoprazole  Injectable 40 milliGRAM(s) IV Push every 12 hours  polyethylene glycol 3350 17 Gram(s) Oral daily  predniSONE   Tablet 5 milliGRAM(s) Oral daily  QUEtiapine 25 milliGRAM(s) Oral at bedtime  senna 2 Tablet(s) Oral at bedtime  tamsulosin 0.4 milliGRAM(s) Oral at bedtime  trimethoprim   80 mG/sulfamethoxazole 400 mG 1 Tablet(s) Oral daily  valGANciclovir 450 milliGRAM(s) Oral daily    MEDICATIONS  (PRN):  glucagon  Injectable 1 milliGRAM(s) IntraMuscular once PRN Glucose <70 milliGRAM(s)/deciLiter                            10.3   5.22  )-----------( 68       ( 10 Jul 2020 06:45 )             30.7       07-10    137  |  102  |  26<H>  ----------------------------<  133<H>  4.4   |  28  |  1.00    Ca    8.6      10 Jul 2020 06:45  Phos  2.7     07-10  Mg     1.8     07-10    TPro  4.8<L>  /  Alb  2.8<L>  /  TBili  1.6<H>  /  DBili  0.7<H>  /  AST  31  /  ALT  90<H>  /  AlkPhos  106  07-10      Constitutional: restless  Eyes: mild icterus  ENMT: NC/AT  Neck: supple  Respiratory: CTA b/l  Cardiovascular: RRR  Gastrointestinal: Soft abdomen, mildly distended; + mild incisional tenderness, incision intact with staples, T-tube tied this am, ANDREINA SS  Genitourinary: voiding spontaneously/condom cath  Extremities: SCD's in place and working bilaterally. L groin cutdown site c/d/i  Vascular: Palpable dp pulses bilaterally  Neurological: AO x 2, upper body tremors improved today, agitated, confused  Skin: no rashes, ulcerations, lesions post-op  Psychiatric: intermittently responsive

## 2020-07-10 NOTE — PROGRESS NOTE ADULT - ASSESSMENT
64 M hx of DM, HLD, GERD, HFpEF with mild LV diastolic dysfunction, and decompensated JEAN cirrhosis c/b ascites with hx of SBP, HE, duodenal ulcer, GAVE and duodenal AVM s/p APC (last on 10/11/19), UNOS listed for liver transplantation at [facility]. 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 He is now re-admitted with hepatic encephalopathy and VRE UTI, also with MISA on CKD. S/p liver transplant 7/2.    Impression:  #S/p liver transplant 7/2: live enzymes continue to downtrend  OR details:  - L groin cutdown for vv bypass   - anastomosis: bicaval end-end/CBD duct-duct/HA & PV end-end, all standard anatomy.    - IOC with good flow  - Simulect induction. 500mg Solumedrol  - JPs x3 with T-Tube  - 14U pRBC, 14U FFP, 10 PLT, 11 CRYO, 500mL returned from cell saver, EBL 5L, UOP 1100mL  Recipient Info:   ABO: A  CMV:  Neg  EBV Positive  Donor:  Donor ID:  [id number] Match: [id number]  Age: 29 ABO:  A1 High Risk:   No COD: Cardiovascular  Anti CMV positive, EBV IgG- positive  HepBcAb-neg, Hepatitis C-DANA- neg, Hepatitis C ab-neg  #Tremors: Ongoign symptoms c/f taco toxicity +/- prednisone and opioid effects. May need medication change based on clinical course  #Halluciniations/agitation: resolved hallucinations but still agitated c/f prednisone toxicity.  #GI bleed - Hgb stable. No overt bleeding. 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 GAVE and PHG should have improved w/ liver transplant, most likely etiology is NG tube trauma prior if any c/f GI bleed  # Abnormal Chest X-Ray c/f evolving pna vs atelectasis: now on meropenem  # Hypertension on nifedipine now. BP improved  # Lower ext edema: diuresing, improving on lasix  #R posterior heel wound w/ overlying eschar – on meropenem for 1 more day. Local wound care. no signs of infections, XR neg for gas, evaluated by podiatry and ID. MRI w/o contrast limited, but no overt signs of active infection.  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  # Hyperglycemia with episodes of overcorrection    Recommendation:  - remove drain/lines as able (per surgery/SICU)  - meropenem per transplant ID; repeat culture and sputum culture  - f/u MRSA and covid swabs  - continue with standing magnesium  - monitor renal function  - c/w cellcept, methylpred taper with monitoring mental status  - half dose cellcept given c/f new pneumonia, now 500 BID  - consider switch to CsA given ongoing neurotoxicity from tacro  - glycemic control with insulin, monitor for hypoglycemia  - c/w nystatin, Bactrim, valcyte ppx  - continue with seroquel and psychosis management per primary team  - trend Hb- PO PPI BID, monitor bowel movements  - aspirin per surgery  - advance diet, low Na diet  - wound care/nursing for R heel ulcer  - continue with 1:1 for agitation precaution  - f/u transplant surgery recs  - rest of care per SICU team  - transplant hepatology will follow 64 M hx of DM, HLD, GERD, HFpEF with mild LV diastolic dysfunction, and decompensated JEAN cirrhosis c/b ascites with hx of SBP, HE, duodenal ulcer, GAVE and duodenal AVM s/p APC (last on 10/11/19), UNOS listed for liver transplantation at [facility]. 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 He is now re-admitted with hepatic encephalopathy and VRE UTI, also with MISA on CKD. S/p liver transplant 7/2.    Impression:  #S/p liver transplant 7/2: live enzymes continue to downtrend  OR details:  - L groin cutdown for vv bypass   - anastomosis: bicaval end-end/CBD duct-duct/HA & PV end-end, all standard anatomy.    - IOC with good flow  - Simulect induction. 500mg Solumedrol  - JPs x3 with T-Tube  - 14U pRBC, 14U FFP, 10 PLT, 11 CRYO, 500mL returned from cell saver, EBL 5L, UOP 1100mL  Recipient Info:   ABO: A  CMV:  Neg  EBV Positive  Donor:  Donor ID:  [id number] Match: [id number]  Age: 29 ABO:  A1 High Risk:   No COD: Cardiovascular  Anti CMV positive, EBV IgG- positive  HepBcAb-neg, Hepatitis C-DANA- neg, Hepatitis C ab-neg  #Tremors: Ongoign symptoms c/f taco toxicity +/- prednisone and opioid effects. May need medication change based on clinical course  #Halluciniations/agitation: resolved hallucinations but still agitated c/f prednisone toxicity.  #GI bleed - Hgb stable. No overt bleeding. 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 GAVE and PHG should have improved w/ liver transplant, most likely etiology is NG tube trauma prior if any c/f GI bleed  # Abnormal Chest X-Ray c/f evolving pna vs atelectasis: now on meropenem  # Hypertension on nifedipine now. BP improved  # Lower ext edema: diuresing, improving on lasix  #R posterior heel wound w/ overlying eschar – on meropenem for 1 more day. Local wound care. no signs of infections, XR neg for gas, evaluated by podiatry and ID. MRI w/o contrast limited, but no overt signs of active infection.  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  # Hyperglycemia with episodes of overcorrection    Recommendation:  - remove drain/lines as able (per surgery/SICU)  - meropenem per transplant ID; repeat culture and sputum culture  - f/u MRSA swab  - continue with standing magnesium  - diuresis with Lasix, s/p 40 mg this AM, will give 80 mg tonight  - monitor renal function  - c/w cellcept, methylpred taper with monitoring mental status  - half dose cellcept given c/f new pneumonia, now 500 BID  - consider switch to CsA given ongoing neurotoxicity from tacro  - glycemic control with insulin, monitor for hypoglycemia  - c/w nystatin, Bactrim, valcyte ppx  - continue with seroquel and psychosis management per primary team  - trend Hb- PO PPI BID, monitor bowel movements  - aspirin per surgery  - advance diet, low Na diet  - wound care/nursing for R heel ulcer  - continue with 1:1 for agitation precaution  - f/u transplant surgery recs  - rest of care per SICU team  - transplant hepatology will follow

## 2020-07-10 NOTE — PROGRESS NOTE ADULT - SUBJECTIVE AND OBJECTIVE BOX
SICU Daily Progress Note  =====================================================  Interval/Overnight Events:      - Meropenem restarted for new RLL infiltrate on CXR and elevated procal (1.1)  - BCx x 2 sent, MRSA/COVID swab pending  - Haldol prn for aggressive agitation (5mg in AM), Seroquel at night  - T-tube tied off  - Endo recs Lantus 12 and Premeal 5    HPI:   64y Male c/b small esophageal varices (12/2019), portal hypertensive gastropathy, ascites, hx SBP, hx hepatic encephalopathy, GAVE syndrome s/p APC, hx duodenal ulcer (5/2019), HTN, HLD, DM, HFpEF (EF 70%), recent ESBL E coli prostate abscess (3/2020 s/p 4 weeks ertapenem), orthostatic hypotension ( on midodrine) who was initially admitted to medicine for hepatic encephalopathy. Hospital course c/b MISA, VRE UTI, acute blood loss anemia, received blood transfusion, EGD done 6/22 with Grade II esophageal varices, acute gastritis with hemorrhage and acute duodenitis with hemorrhage. Patient had worsening mental status, concerning decompensating liver cirrhosis, hepatic encephalopathy patient then transferred to SICU for further care.      Allergies: codeine (Anaphylaxis)      MEDICATIONS:   --------------------------------------------------------------------------------------  Neurologic Medications  QUEtiapine 25 milliGRAM(s) Oral at bedtime    Respiratory Medications  sodium chloride 3%  Inhalation 5 milliLiter(s) Inhalation once    Cardiovascular Medications  NIFEdipine XL 30 milliGRAM(s) Oral daily  tamsulosin 0.4 milliGRAM(s) Oral at bedtime    Gastrointestinal Medications  magnesium oxide 400 milliGRAM(s) Oral two times a day with meals  pantoprazole  Injectable 40 milliGRAM(s) IV Push every 12 hours  polyethylene glycol 3350 17 Gram(s) Oral daily  senna 2 Tablet(s) Oral at bedtime    Genitourinary Medications    Hematologic/Oncologic Medications  aspirin enteric coated 81 milliGRAM(s) Oral daily  mycophenolate mofetil 500 milliGRAM(s) Oral <User Schedule>  tacrolimus 2.5 milliGRAM(s) Oral <User Schedule>    Antimicrobial/Immunologic Medications  meropenem  IVPB 1000 milliGRAM(s) IV Intermittent every 8 hours  nystatin    Suspension 744089 Unit(s) Swish and Swallow four times a day  trimethoprim   80 mG/sulfamethoxazole 400 mG 1 Tablet(s) Oral daily  valGANciclovir 450 milliGRAM(s) Oral daily    Endocrine/Metabolic Medications  glucagon  Injectable 1 milliGRAM(s) IntraMuscular once PRN Glucose <70 milliGRAM(s)/deciLiter  insulin glargine Injectable (LANTUS) 12 Unit(s) SubCutaneous at bedtime  insulin lispro (HumaLOG) corrective regimen sliding scale   SubCutaneous three times a day before meals  insulin lispro (HumaLOG) corrective regimen sliding scale   SubCutaneous at bedtime  insulin lispro Injectable (HumaLOG) 5 Unit(s) SubCutaneous three times a day before meals  predniSONE   Tablet 5 milliGRAM(s) Oral daily    Topical/Other Medications  chlorhexidine 2% Cloths 1 Application(s) Topical <User Schedule>  collagenase Ointment 1 Application(s) Topical daily  nystatin Cream 1 Application(s) Topical two times a day    --------------------------------------------------------------------------------------    VITAL SIGNS, INS/OUTS (last 24 hours):  --------------------------------------------------------------------------------------  I&O's Detail    08 Jul 2020 07:01  -  09 Jul 2020 07:00  --------------------------------------------------------  IN:    Oral Fluid: 720 mL    Solution: 50 mL  Total IN: 770 mL    OUT:    Drain: 170 mL    Incontinent per Condom Catheter: 1000 mL    Intermittent Catheterization - Urethral: 450 mL  Total OUT: 1620 mL    Total NET: -850 mL      09 Jul 2020 07:01  -  10 Jul 2020 00:40  --------------------------------------------------------  IN:    Oral Fluid: 1295 mL  Total IN: 1295 mL    OUT:    Drain: 210 mL    Incontinent per Condom Catheter: 1325 mL  Total OUT: 1535 mL    Total NET: -240 mL    --------------------------------------------------------------------------------------    EXAM  NEUROLOGY  Exam: Confused, altered mental status, Oriented x 3, no focal deficits, intermittently agitated     HEENT  Exam: Normocephalic, atraumatic    RESPIRATORY  Exam: Lungs clear to auscultation, no wheezes/rhonchi/rales, Normal expansion/effort.   Mechanical Ventilation: N/A    CARDIOVASCULAR  Exam: S1, S2.  Regular rate and rhythm.    GI/NUTRITION  Exam: Abdomen soft, Non-tender, Non-distended.    Wound: Dressing c/d/i  Current Diet:  Regular, Consistent Carb    VASCULAR  Exam: Extremities warm, pink, well-perfused.     MUSCULOSKELETAL  Exam: All extremities moving spontaneously without limitations. Pitting edema in bilateral lower extremities     SKIN  Exam: Good skin turgor, no skin breakdown.     METABOLIC/FLUIDS/ELECTROLYTES  magnesium oxide 400 milliGRAM(s) Oral two times a day with meals      HEMATOLOGIC  [x] VTE Prophylaxis: aspirin enteric coated 81 milliGRAM(s) Oral daily    Transfusions:	[] PRBC	[] Platelets		[] FFP	[] Cryoprecipitate    INFECTIOUS DISEASE  Antimicrobials/Immunologic Medications:  meropenem  IVPB 1000 milliGRAM(s) IV Intermittent every 8 hours  mycophenolate mofetil 500 milliGRAM(s) Oral <User Schedule>  nystatin    Suspension [id number] Unit(s) Swish and Swallow four times a day  tacrolimus 2.5 milliGRAM(s) Oral <User Schedule>  trimethoprim   80 mG/sulfamethoxazole 400 mG 1 Tablet(s) Oral daily  valGANciclovir 450 milliGRAM(s) Oral daily      Tubes/Lines/Drains   [x] Peripheral IV  [] Central Venous Line     	[] R	[] L	[] IJ	[] Fem	[] SC	Date Placed:   [] Arterial Line		[] R	[] L	[] Fem	[] Rad	[] Ax	Date Placed:   [] PICC		[] Midline		[] Mediport  [] Urinary Catheter		Date Placed:   [x] Necessity of urinary, arterial, and venous catheters discussed    LABS  --------------------------------------------------------------------------------------  07-09    136  |  102  |  32<H>  ----------------------------<  134<H>  4.3   |  28  |  1.04    Ca    8.6      09 Jul 2020 04:37  Phos  2.9     07-09  Mg     2.0     07-09    TPro  4.7<L>  /  Alb  2.8<L>  /  TBili  1.6<H>  /  DBili  0.8<H>  /  AST  33  /  ALT  96<H>  /  AlkPhos  83  07-09                          10.5   3.79  )-----------( 66       ( 09 Jul 2020 04:37 )             32.0     PT/INR - ( 09 Jul 2020 04:37 )   PT: 13.3 sec;   INR: 1.16 ratio         PTT - ( 09 Jul 2020 04:37 )  PTT:27.0 sec  --------------------------------------------------------------------------------------    OTHER LABORATORY:     Ammonia, Serum (07.09.20 @ 05:39)    Ammonia, Serum: 60 umol/L    Procalcitonin, Serum (07.09.20 @ 18:15)    Procalcitonin, Serum: 1.10      IMAGING STUDIES:   CXR: New RLL infiltrate consistent with developing pneumonia      ASSESSMENT:  64y Male c/b small esophageal varices (12/2019), portal hypertensive gastropathy, ascites, hx SBP, hx hepatic encephalopathy, GAVE syndrome s/p APC, hx duodenal ulcer (5/2019), HTN, HLD, DM, HFpEF (EF 70%), recent ESBL E coli prostate abscess (3/2020 s/p 4 weeks ertapenem), orthostatic hypotension ( on midodrine) who was initially admitted to medicine for hepatic encephalopathy. Hospital course c/b MISA, VRE UTI, acute blood loss anemia, received blood transfusion, EGD done 6/22 with Grade II esophageal varices, acute gastritis with hemorrhage and acute duodenitis with hemorrhage. Patient had worsening mental status, concerning decompensating liver cirrhosis, hepatic encephalopathy patient then transferred to SICU for further care.    PLAN:   Neurologic: tremors, hallucinations, confusion 2/2 ?tacro toxicity  - Neuro status worsening, appears more confused; oxycodone discontinued  - Promote sleep hygiene nocturnal CPAP ordered to improve sleep and prevent sleep apnea   - Seroquel at bedtime       Respiratory: RLL infiltrate developing  - IS, OOBTC as tolerated  - Nocturnal CPAP   - F/u sputum Cx; MRSA swab   - Continuous pulse oximetry    Cardiovascular: Intermittent hypertension  - Nifedipine 30mg daily  - Labetalol PRN for SBP goal <145  - ASA  - Monitor hemodynamic status    Gastrointestinal/Nutrition: GIB, JEAN cirrhosis, GAVE syndrome. S/p liver transplant   - Regular, consistent carb diet. Tolerating well.  - Protonix 40mg BID  - Trend LFTs, currently downtrending    Renal: urinary retention, hyponatremia  - C/w flomax for urinary retention  - Trend BMPs, replete lytes prn  - Lasix prn     Heme: coagulopathy  - Venodynes for mechanical VTE prophylaxis, holding chemical VTE prophylaxis in the setting of coagulopathy/GIB.  - Trend H/H, coags    Infectious Disease: immunosuppresion; possible RLL PNA  - F/u on BCx  - Conitnue meropenem   - Continue PPx as per transplant Bactrim, valcyte  - Continue immunosuppression as per transplant solumedrol, cellcept, tacro    Endo: DM type II, HLD  - ISS, lantus 12u qhs, 5u premeal/qhs insulin  - Monitor FS premeal and qhs    Disposition:  - SICU full code SICU Daily Progress Note  =====================================================  Interval/Overnight Events:      - Meropenem restarted for new RLL infiltrate on CXR and elevated procal (1.1)  - BCx x 2 sent, MRSA/COVID swab pending, U/A negative for infection   - Haldol prn for aggressive agitation (5mg in AM), Seroquel at night  - T-tube tied off  - Endo recs Lantus 12 and Premeal 5    HPI:   64y Male c/b small esophageal varices (12/2019), portal hypertensive gastropathy, ascites, hx SBP, hx hepatic encephalopathy, GAVE syndrome s/p APC, hx duodenal ulcer (5/2019), HTN, HLD, DM, HFpEF (EF 70%), recent ESBL E coli prostate abscess (3/2020 s/p 4 weeks ertapenem), orthostatic hypotension ( on midodrine) who was initially admitted to medicine for hepatic encephalopathy. Hospital course c/b MISA, VRE UTI, acute blood loss anemia, received blood transfusion, EGD done 6/22 with Grade II esophageal varices, acute gastritis with hemorrhage and acute duodenitis with hemorrhage. Patient had worsening mental status, concerning decompensating liver cirrhosis, hepatic encephalopathy patient then transferred to SICU for further care.      Allergies: codeine (Anaphylaxis)      MEDICATIONS:   --------------------------------------------------------------------------------------  Neurologic Medications  QUEtiapine 25 milliGRAM(s) Oral at bedtime    Respiratory Medications  sodium chloride 3%  Inhalation 5 milliLiter(s) Inhalation once    Cardiovascular Medications  NIFEdipine XL 30 milliGRAM(s) Oral daily  tamsulosin 0.4 milliGRAM(s) Oral at bedtime    Gastrointestinal Medications  magnesium oxide 400 milliGRAM(s) Oral two times a day with meals  pantoprazole  Injectable 40 milliGRAM(s) IV Push every 12 hours  polyethylene glycol 3350 17 Gram(s) Oral daily  senna 2 Tablet(s) Oral at bedtime    Genitourinary Medications    Hematologic/Oncologic Medications  aspirin enteric coated 81 milliGRAM(s) Oral daily  mycophenolate mofetil 500 milliGRAM(s) Oral <User Schedule>  tacrolimus 2.5 milliGRAM(s) Oral <User Schedule>    Antimicrobial/Immunologic Medications  meropenem  IVPB 1000 milliGRAM(s) IV Intermittent every 8 hours  nystatin    Suspension 199822 Unit(s) Swish and Swallow four times a day  trimethoprim   80 mG/sulfamethoxazole 400 mG 1 Tablet(s) Oral daily  valGANciclovir 450 milliGRAM(s) Oral daily    Endocrine/Metabolic Medications  glucagon  Injectable 1 milliGRAM(s) IntraMuscular once PRN Glucose <70 milliGRAM(s)/deciLiter  insulin glargine Injectable (LANTUS) 12 Unit(s) SubCutaneous at bedtime  insulin lispro (HumaLOG) corrective regimen sliding scale   SubCutaneous three times a day before meals  insulin lispro (HumaLOG) corrective regimen sliding scale   SubCutaneous at bedtime  insulin lispro Injectable (HumaLOG) 5 Unit(s) SubCutaneous three times a day before meals  predniSONE   Tablet 5 milliGRAM(s) Oral daily    Topical/Other Medications  chlorhexidine 2% Cloths 1 Application(s) Topical <User Schedule>  collagenase Ointment 1 Application(s) Topical daily  nystatin Cream 1 Application(s) Topical two times a day    --------------------------------------------------------------------------------------    VITAL SIGNS, INS/OUTS (last 24 hours):  --------------------------------------------------------------------------------------  I&O's Detail    08 Jul 2020 07:01  -  09 Jul 2020 07:00  --------------------------------------------------------  IN:    Oral Fluid: 720 mL    Solution: 50 mL  Total IN: 770 mL    OUT:    Drain: 170 mL    Incontinent per Condom Catheter: 1000 mL    Intermittent Catheterization - Urethral: 450 mL  Total OUT: 1620 mL    Total NET: -850 mL      09 Jul 2020 07:01  -  10 Jul 2020 00:40  --------------------------------------------------------  IN:    Oral Fluid: 1295 mL  Total IN: 1295 mL    OUT:    Drain: 210 mL    Incontinent per Condom Catheter: 1325 mL  Total OUT: 1535 mL    Total NET: -240 mL    --------------------------------------------------------------------------------------    EXAM  NEUROLOGY  Exam: Confused, altered mental status, Oriented x 3, no focal deficits, intermittently agitated     HEENT  Exam: Normocephalic, atraumatic    RESPIRATORY  Exam: Lungs clear to auscultation, no wheezes/rhonchi/rales, Normal expansion/effort.   Mechanical Ventilation: N/A    CARDIOVASCULAR  Exam: S1, S2.  Regular rate and rhythm.    GI/NUTRITION  Exam: Abdomen soft, Non-tender, Non-distended.    Wound: Dressing c/d/i  Current Diet:  Regular, Consistent Carb    VASCULAR  Exam: Extremities warm, pink, well-perfused.     MUSCULOSKELETAL  Exam: All extremities moving spontaneously without limitations. Pitting edema in bilateral lower extremities     SKIN  Exam: Good skin turgor, no skin breakdown.     METABOLIC/FLUIDS/ELECTROLYTES  magnesium oxide 400 milliGRAM(s) Oral two times a day with meals      HEMATOLOGIC  [x] VTE Prophylaxis: aspirin enteric coated 81 milliGRAM(s) Oral daily    Transfusions:	[] PRBC	[] Platelets		[] FFP	[] Cryoprecipitate    INFECTIOUS DISEASE  Antimicrobials/Immunologic Medications:  meropenem  IVPB 1000 milliGRAM(s) IV Intermittent every 8 hours  mycophenolate mofetil 500 milliGRAM(s) Oral <User Schedule>  nystatin    Suspension [id number] Unit(s) Swish and Swallow four times a day  tacrolimus 2.5 milliGRAM(s) Oral <User Schedule>  trimethoprim   80 mG/sulfamethoxazole 400 mG 1 Tablet(s) Oral daily  valGANciclovir 450 milliGRAM(s) Oral daily      Tubes/Lines/Drains   [x] Peripheral IV  [] Central Venous Line     	[] R	[] L	[] IJ	[] Fem	[] SC	Date Placed:   [] Arterial Line		[] R	[] L	[] Fem	[] Rad	[] Ax	Date Placed:   [] PICC		[] Midline		[] Mediport  [] Urinary Catheter		Date Placed:   [x] Necessity of urinary, arterial, and venous catheters discussed    LABS  --------------------------------------------------------------------------------------  07-09    136  |  102  |  32<H>  ----------------------------<  134<H>  4.3   |  28  |  1.04    Ca    8.6      09 Jul 2020 04:37  Phos  2.9     07-09  Mg     2.0     07-09    TPro  4.7<L>  /  Alb  2.8<L>  /  TBili  1.6<H>  /  DBili  0.8<H>  /  AST  33  /  ALT  96<H>  /  AlkPhos  83  07-09                          10.5   3.79  )-----------( 66       ( 09 Jul 2020 04:37 )             32.0     PT/INR - ( 09 Jul 2020 04:37 )   PT: 13.3 sec;   INR: 1.16 ratio         PTT - ( 09 Jul 2020 04:37 )  PTT:27.0 sec  --------------------------------------------------------------------------------------    OTHER LABORATORY:     Ammonia, Serum (07.09.20 @ 05:39)    Ammonia, Serum: 60 umol/L    Procalcitonin, Serum (07.09.20 @ 18:15)    Procalcitonin, Serum: 1.10      IMAGING STUDIES:   CXR: New RLL infiltrate consistent with developing pneumonia      ASSESSMENT:  64y Male c/b small esophageal varices (12/2019), portal hypertensive gastropathy, ascites, hx SBP, hx hepatic encephalopathy, GAVE syndrome s/p APC, hx duodenal ulcer (5/2019), HTN, HLD, DM, HFpEF (EF 70%), recent ESBL E coli prostate abscess (3/2020 s/p 4 weeks ertapenem), orthostatic hypotension ( on midodrine) who was initially admitted to medicine for hepatic encephalopathy. Hospital course c/b MISA, VRE UTI, acute blood loss anemia, received blood transfusion, EGD done 6/22 with Grade II esophageal varices, acute gastritis with hemorrhage and acute duodenitis with hemorrhage. Patient had worsening mental status, concerning decompensating liver cirrhosis, hepatic encephalopathy patient then transferred to SICU for further care.    PLAN:   Neurologic: tremors, hallucinations, confusion 2/2 ?tacro toxicity  - Neuro status worsening, appears more confused; oxycodone discontinued  - Promote sleep hygiene nocturnal CPAP ordered to improve sleep and prevent sleep apnea   - Seroquel at bedtime       Respiratory: RLL infiltrate developing  - IS, OOBTC as tolerated  - Nocturnal CPAP   - F/u sputum Cx; MRSA swab   - Continuous pulse oximetry    Cardiovascular: Intermittent hypertension  - Nifedipine 30mg daily  - Labetalol PRN for SBP goal <145  - ASA  - Monitor hemodynamic status    Gastrointestinal/Nutrition: GIB, JEAN cirrhosis, GAVE syndrome. S/p liver transplant   - Regular, consistent carb diet. Tolerating well.  - Protonix 40mg BID  - Trend LFTs, currently downtrending    Renal: urinary retention, hyponatremia  - C/w flomax for urinary retention  - Trend BMPs, replete lytes prn  - Lasix prn     Heme: coagulopathy  - Venodynes for mechanical VTE prophylaxis, holding chemical VTE prophylaxis in the setting of coagulopathy/GIB.  - Trend H/H, coags    Infectious Disease: immunosuppresion; possible RLL PNA  - F/u on BCx  - Conitnue meropenem   - Continue PPx as per transplant Bactrim, valcyte  - Continue immunosuppression as per transplant solumedrol, cellcept, tacro    Endo: DM type II, HLD  - ISS, lantus 12u qhs, 5u premeal/qhs insulin  - Monitor FS premeal and qhs    Disposition:  - SICU full code

## 2020-07-10 NOTE — PROGRESS NOTE ADULT - SUBJECTIVE AND OBJECTIVE BOX
Follow Up:      Interval History/ROS: Patient is a 64 y old male who presents with a chief complaint of Liver failure, hepatic encephalopathy.     Patient is a 64 year old male with past medical history of insulin dependent diabtes mellitus, hyperlipidemia, obesity, heart failure with preserved ejection fraction with mild LV diastolic dysfunction, MGUS, chronic anemia, duodenal ulcer, GAVE,  duodenal AVM s/p APC (last on 10/11/19) who presented with 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 The patient has a history of multiple recent hospitalizations due to UTIs, emphysematous cystitis (2020) and prostate abscess (3/2020). The patient was re-admitted with hepatic encephalopathy and VRE UTI, also with MISA on CKD. S/p liver transplant . Unable obtain to accurate ROS today due to patient's underlying mental status.       Allergies: Codeine (Anaphylaxis)    Intolerances    REVIEW OF SYSTEMS  [  ] ROS unobtainable because:    [ x] All other systems negative except as noted below:	    Constitutional:  [ ] fever [ ] chills  [ ] weight loss  [ ] weakness  Skin:  [ ] rash [ ] phlebitis	  Eyes: [ ] icterus [ ] pain  [ ] discharge	  ENMT: [ ] sore throat  [ ] thrush [ ] ulcers [ ] exudates  Respiratory: [ ] dyspnea [ ] hemoptysis [ ] cough [ ] sputum	  Cardiovascular:  [ ] chest pain [ ] palpitations [ ] edema	  Gastrointestinal:  [ ] nausea [ ] vomiting [ ] diarrhea [ ] constipation [ ] pain	  Genitourinary:  [ ] dysuria [ ] frequency [ ] hematuria [ ] discharge [ ] flank pain  [ ] incontinence  Musculoskeletal:  [ ] myalgias [ ] arthralgias [ ] arthritis  [ ] back pain  Neurological:  [ ] headache [ ] seizures  [x] confusion/altered mental status  Psychiatric:  [ ] anxiety [ ] depression	  Hematology/Lymphatics:  [ ] lymphadenopathy  Endocrine:  [ ] adrenal [ ] thyroid  Allergic/Immunologic:	 [ ] transplant [ ] seasonal    ANTIMICROBIALS:  meropenem  IVPB 1000 every 8 hours  nystatin    Suspension 968414 four times a day  trimethoprim   80 mG/sulfamethoxazole 400 mG 1 daily  valGANciclovir 450 daily      OTHER MEDS:  aspirin enteric coated 81 milliGRAM(s) Oral daily  chlorhexidine 2% Cloths 1 Application(s) Topical <User Schedule>  collagenase Ointment 1 Application(s) Topical daily  furosemide    Tablet 40 milliGRAM(s) Oral <User Schedule>  glucagon  Injectable 1 milliGRAM(s) IntraMuscular once PRN  insulin glargine Injectable (LANTUS) 12 Unit(s) SubCutaneous at bedtime  insulin lispro (HumaLOG) corrective regimen sliding scale   SubCutaneous three times a day before meals  insulin lispro (HumaLOG) corrective regimen sliding scale   SubCutaneous at bedtime  insulin lispro Injectable (HumaLOG) 5 Unit(s) SubCutaneous three times a day before meals  magnesium oxide 400 milliGRAM(s) Oral two times a day with meals  mycophenolate mofetil 500 milliGRAM(s) Oral <User Schedule>  NIFEdipine XL 30 milliGRAM(s) Oral daily  nystatin Cream 1 Application(s) Topical two times a day  pantoprazole  Injectable 40 milliGRAM(s) IV Push every 12 hours  polyethylene glycol 3350 17 Gram(s) Oral daily  predniSONE   Tablet 5 milliGRAM(s) Oral daily  QUEtiapine 25 milliGRAM(s) Oral at bedtime  senna 2 Tablet(s) Oral at bedtime  tacrolimus 2.5 milliGRAM(s) Oral <User Schedule>  tamsulosin 0.4 milliGRAM(s) Oral at bedtime      Vital Signs Last 24 Hrs  T(C): 36.7 (10 Jul 2020 15:00), Max: 36.7 (2020 23:00)  T(F): 98.1 (10 Jul 2020 15:00), Max: 98.1 (2020 23:00)  HR: 73 (10 Jul 2020 16:00) (70 - 80)  BP: 115/62 (10 Jul 2020 16:00) (93/56 - 151/68)  BP(mean): 84 (10 Jul 2020 16:00) (64 - 98)  RR: 19 (10 Jul 2020 16:00) (5 - 40)  SpO2: 100% (10 Jul 2020 16:00) (66% - 100%)    EXAM:  Constitutional: Not in acute distress  Eyes: No icterus. Pupils b/l reactive to light.  Oral cavity: Clear, no lesions  Neck: No neck vein distension noted  RS: Chest clear to auscultation bilaterally. No wheeze/rhonchi/crepitations.  CVS: S1, S2 heard. Regular rate and rhythm. No murmurs/rubs/gallops.  Abdomen: Soft. No guarding/rigidity/tenderness.  : No acute abnormalities  Extremities: Warm. No pedal edema  Skin: No lesions noted  Vascular: No evidence of phlebitis  Neuro: Alert, oriented to time/place/person                        10.3   5.22  )-----------( 68       ( 10 Jul 2020 06:45 )             30.7       07-10    137  |  102  |  26<H>  ----------------------------<  133<H>  4.4   |  28  |  1.00    Ca    8.6      10 Jul 2020 06:45  Phos  2.7     07-10  Mg     1.8     07-10    TPro  4.8<L>  /  Alb  2.8<L>  /  TBili  1.6<H>  /  DBili  0.7<H>  /  AST  31  /  ALT  90<H>  /  AlkPhos  106  07-10      Urinalysis Basic - ( 10 Jul 2020 03:23 )    Color: Light Yellow / Appearance: Clear / S.011 / pH: x  Gluc: x / Ketone: Negative  / Bili: Negative / Urobili: Negative   Blood: x / Protein: Negative / Nitrite: Negative   Leuk Esterase: Negative / RBC: 2 /hpf / WBC 1 /HPF   Sq Epi: x / Non Sq Epi: 0 /hpf / Bacteria: 0.0        MICROBIOLOGY:    No growth to date. ( @ 11:00)  Culture Results:   No growth to date. ( @ 11:00)  Culture Results:   No growth ( @ 10:11)  Culture Results:   Testing in progress ( @ 01:05)  Culture Results:   No growth ( @ 01:05)    CMV IgG Antibody: <0.20 U/mL (19 @ 08:33)  Toxoplasma IgG Screen: <3.0 IU/mL (19 @ 08:33)    RADIOLOGY:    <The imaging below has been reviewed and visualized by me independently. Findings as detailed in report below>    EXAM:  XR CHEST PORTABLE URGENT 1V                        PROCEDURE DATE:  2020    Small right base infiltrate.    RADIOLOGY:   20:  MRI right ankle with and without contrast: Evaluation for soft tissue infection is limited without intravenous contrast. Although there is heterogeneous marrow signal there is no skin ulceration, sinus tract or abscess identified with adjacent marrow signal abnormality suggest osteomyelitis.  There is edema within the abductor hallucis and flexor digitorum brevis muscles. There is minimal edema and marked atrophy within the adductor digit minimi muscle. These findings are likely related to denervation edema. The tendons at the ankle and hindfoot are intact. There is mild flexor hallucis longus tenosynovitis. There is mild spurring at the Achilles insertion. There is marked thickening at the middle cord of the plantar fascia which contains a small ossicle which is chronic. There is also focal thickening at the proximal lateral cortex of the plantar fascia likely related to old injury. There is marked spurring dorsally at the second tarsometatarsal joint. There is edema within the sinus Tarsi with scarring.   The Lisfranc ligament is at the edge of the field although the interosseous ligament is grossly intact. The dorsal component is markedly thickened and chronically degenerated or torn. The distal syndesmotic ligaments are intact. The lateral collateral ligaments are intact. There is thickening at the deltoid ligament which is continuous.  Impression: Evaluation for soft tissue infection is limited without intravenous contrast. Heterogeneous marrow signal without an adjacent skin ulceration or fluid collection to suggest osteomyelitis.      ASSESSMENT:  The patient is a 64 year old male with PMHx of insulin dependent diabetes mellitus, hyperlipidemia, obesity, heart failure with preserved ejection fraction with mild LV diastolic dysfunction, MGUS, chronic anemia, duodenal ulcer, GAVE,  duodenal AVM s/p APC (last on 10/11/19) who presented with 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 The patient has a history of multiple recent hospitalizations due to UTIs, emphysematous cystitis and prostate abscess. The patient was re-admitted with hepatic encephalopathy and VRE UTI, also with MISA on CKD. S/p liver transplant 20.   Patient's CXR showed right sided infiltrate with suspected aspiration pneumonia.       Plan:  #Right lower lobe pneumonia, Hypoxic Respiratory Failure, Encephalopathy  Obtain 2x blood cultures  Procalcitonin level   MRSA/MSSA PCR  Repeat COVID19 PCR swab    Intravenous Meropenem 1g IV Q8H    #s/p OLT   Continue bactrim, valcyte for prophylaxis   Monitor patient for tremors - monitor tacrolimus dose   Off antibiotics   Removal of drains and lines as per surgical transplant team    #Right heel wound  Podiatry on board    Local wound care   SICU, transplant, and hepatology on board     Charlie Lafleur MD PGY-4   Fellow, Infectious Diseases   Pager: [phone]  If no response, after 5pm and on weekends: Call [phone] Follow Up:      Interval History/ROS: Patient is a 64 y old male who presents with a chief complaint of Liver failure, hepatic encephalopathy.     Patient is a 64 year old male with past medical history of insulin dependent diabtes mellitus, hyperlipidemia, obesity, heart failure with preserved ejection fraction with mild LV diastolic dysfunction, MGUS, chronic anemia, duodenal ulcer, GAVE,  duodenal AVM s/p APC (last on 10/11/19) who presented with 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 The patient has a history of multiple recent hospitalizations due to UTIs, emphysematous cystitis (2020) and prostate abscess (3/2020). The patient was re-admitted with hepatic encephalopathy and VRE UTI, also with MISA on CKD. S/p liver transplant . Unable obtain to accurate ROS today due to patient's underlying mental status.     Patient still confused overnight. intermittent hypoxia. patient denies dysuria, abdominal pain.     Allergies: Codeine (Anaphylaxis)    REVIEW OF SYSTEMS  [  ] ROS unobtainable because:    [ x] All other systems negative except as noted below:	    Constitutional:  [ ] fever [ ] chills  [ ] weight loss  [ ] weakness  Skin:  [ ] rash [ ] phlebitis	  Eyes: [ ] icterus [ ] pain  [ ] discharge	  ENMT: [ ] sore throat  [ ] thrush [ ] ulcers [ ] exudates  Respiratory: [ ] dyspnea [ ] hemoptysis [ ] cough [ ] sputum	  Cardiovascular:  [ ] chest pain [ ] palpitations [ ] edema	  Gastrointestinal:  [ ] nausea [ ] vomiting [ ] diarrhea [ ] constipation [ ] pain	  Genitourinary:  [ ] dysuria [ ] frequency [ ] hematuria [ ] discharge [ ] flank pain  [ ] incontinence  Musculoskeletal:  [ ] myalgias [ ] arthralgias [ ] arthritis  [ ] back pain  Neurological:  [ ] headache [ ] seizures  [x] confusion/altered mental status  Psychiatric:  [ ] anxiety [ ] depression	  Hematology/Lymphatics:  [ ] lymphadenopathy  Endocrine:  [ ] adrenal [ ] thyroid  Allergic/Immunologic:	 [ ] transplant [ ] seasonal    ANTIMICROBIALS:  meropenem  IVPB 1000 every 8 hours  nystatin    Suspension 366626 four times a day  trimethoprim   80 mG/sulfamethoxazole 400 mG 1 daily  valGANciclovir 450 daily      OTHER MEDS:  aspirin enteric coated 81 milliGRAM(s) Oral daily  chlorhexidine 2% Cloths 1 Application(s) Topical <User Schedule>  collagenase Ointment 1 Application(s) Topical daily  furosemide    Tablet 40 milliGRAM(s) Oral <User Schedule>  glucagon  Injectable 1 milliGRAM(s) IntraMuscular once PRN  insulin glargine Injectable (LANTUS) 12 Unit(s) SubCutaneous at bedtime  insulin lispro (HumaLOG) corrective regimen sliding scale   SubCutaneous three times a day before meals  insulin lispro (HumaLOG) corrective regimen sliding scale   SubCutaneous at bedtime  insulin lispro Injectable (HumaLOG) 5 Unit(s) SubCutaneous three times a day before meals  magnesium oxide 400 milliGRAM(s) Oral two times a day with meals  mycophenolate mofetil 500 milliGRAM(s) Oral <User Schedule>  NIFEdipine XL 30 milliGRAM(s) Oral daily  nystatin Cream 1 Application(s) Topical two times a day  pantoprazole  Injectable 40 milliGRAM(s) IV Push every 12 hours  polyethylene glycol 3350 17 Gram(s) Oral daily  predniSONE   Tablet 5 milliGRAM(s) Oral daily  QUEtiapine 25 milliGRAM(s) Oral at bedtime  senna 2 Tablet(s) Oral at bedtime  tacrolimus 2.5 milliGRAM(s) Oral <User Schedule>  tamsulosin 0.4 milliGRAM(s) Oral at bedtime      Vital Signs Last 24 Hrs  T(C): 36.7 (10 Jul 2020 15:00), Max: 36.7 (2020 23:00)  T(F): 98.1 (10 Jul 2020 15:00), Max: 98.1 (2020 23:00)  HR: 73 (10 Jul 2020 16:00) (70 - 80)  BP: 115/62 (10 Jul 2020 16:00) (93/56 - 151/68)  BP(mean): 84 (10 Jul 2020 16:00) (64 - 98)  RR: 19 (10 Jul 2020 16:00) (5 - 40)  SpO2: 100% (10 Jul 2020 16:00) (66% - 100%)    EXAM:  Constitutional: Is tremulous on examination and somewhat lethargic   Eyes: No icterus. Pupils b/l reactive to light.  Oral cavity: Clear, no lesions  Neck: No neck vein distension noted  RS: Chest clear to auscultation bilaterally. No wheeze/rhonchi/crepitations.  CVS: S1, S2 heard. Regular rate and rhythm. No murmurs/rubs/gallops.  Abdomen: OLT staple line appears clean without erythema or drainage. No tenderness to palpation. Soft. No guarding/rigidity/tenderness.  : +condom catheter   Extremities: Right heel is appear clean with only some medihoney on the heel. no underlying fluctuance or tenderness to palpation   Skin: No lesions noted  Vascular: No evidence of phlebitis  Neuro: Alert, oriented to time/place/person                        10.3   5.22  )-----------( 68       ( 10 Jul 2020 06:45 )             30.7       07-10    137  |  102  |  26<H>  ----------------------------<  133<H>  4.4   |  28  |  1.00    Ca    8.6      10 Jul 2020 06:45  Phos  2.7     07-10  Mg     1.8     07-10    TPro  4.8<L>  /  Alb  2.8<L>  /  TBili  1.6<H>  /  DBili  0.7<H>  /  AST  31  /  ALT  90<H>  /  AlkPhos  106  07-10      Urinalysis Basic - ( 10 Jul 2020 03:23 )    Color: Light Yellow / Appearance: Clear / S.011 / pH: x  Gluc: x / Ketone: Negative  / Bili: Negative / Urobili: Negative   Blood: x / Protein: Negative / Nitrite: Negative   Leuk Esterase: Negative / RBC: 2 /hpf / WBC 1 /HPF   Sq Epi: x / Non Sq Epi: 0 /hpf / Bacteria: 0.0        MICROBIOLOGY:    Culture - Blood (07.10.20 @ 00:22)    Gram Stain:   Growth in aerobic bottle: Gram positive cocci in pairs    -  Vancomycin resistant Enterococcus sp.: Detec    Specimen Source: .Blood Blood-Peripheral    Organism: Blood Culture PCR    Culture Results:   Growth in aerobic bottle: Gram positive cocci in pairs  "Due to technical problems, Proteus sp. will Not be reported as part of  the BCID panel until further notice"  ***Blood Panel PCR results on this specimen are available  approximately 3 hours after the Gram stain result.***  Gram stain, PCR, and/or culture results may not always  correspond due to difference in methodologies.  ************************************************************  This PCR assay was performed using [employer].  The following targets are tested for: Enterococcus,  vancomycin resistant enterococci, Listeria monocytogenes,  coagulase negative staphylococci, S. aureus,  methicillin resistant S. aureus, Streptococcus agalactiae  (Group B), S. pneumoniae, S. pyogenes (Group A),  Acinetobacter baumannii, Enterobacter cloacae, E. coli,  Klebsiella oxytoca, K. pneumoniae, Proteus sp.,  Serratia marcescens, Haemophilus influenzae,  Neisseria meningitidis, Pseudomonas aeruginosa, Candida  albicans, C. glabrata, C krusei, C parapsilosis,  C. tropicalis and the KPC resistance gene.    Organism Identification: Blood Culture PCR    Method Type: PCR        No growth to date. ( @ 11:00)  Culture Results:   No growth to date. ( @ 11:00)  Culture Results:   No growth ( @ 10:11)  Culture Results:   Testing in progress ( @ 01:05)  Culture Results:   No growth ( @ :05)    CMV IgG Antibody: <0.20 U/mL (19 @ 08:33)  Toxoplasma IgG Screen: <3.0 IU/mL (19 @ 08:33)    RADIOLOGY:    <The imaging below has been reviewed and visualized by me independently. Findings as detailed in report below>      EXAM:  XR CHEST PORTABLE ROUTINE 1V                        PROCEDURE DATE:  07/10/2020    The mediastinal cardiac silhouette is unremarkable.  Previously seen right lower lobe opacity has improved with only a small linear marking remaining, likely atelectasis. Clear left lung.  No acute osseous finding.     20:  MRI right ankle with and without contrast: Evaluation for soft tissue infection is limited without intravenous contrast. Although there is heterogeneous marrow signal there is no skin ulceration, sinus tract or abscess identified with adjacent marrow signal abnormality suggest osteomyelitis.  There is edema within the abductor hallucis and flexor digitorum brevis muscles. There is minimal edema and marked atrophy within the adductor digit minimi muscle. These findings are likely related to denervation edema. The tendons at the ankle and hindfoot are intact. There is mild flexor hallucis longus tenosynovitis. There is mild spurring at the Achilles insertion. There is marked thickening at the middle cord of the plantar fascia which contains a small ossicle which is chronic. There is also focal thickening at the proximal lateral cortex of the plantar fascia likely related to old injury. There is marked spurring dorsally at the second tarsometatarsal joint. There is edema within the sinus Tarsi with scarring.   The Lisfranc ligament is at the edge of the field although the interosseous ligament is grossly intact. The dorsal component is markedly thickened and chronically degenerated or torn. The distal syndesmotic ligaments are intact. The lateral collateral ligaments are intact. There is thickening at the deltoid ligament which is continuous.  Impression: Evaluation for soft tissue infection is limited without intravenous contrast. Heterogeneous marrow signal without an adjacent skin ulceration or fluid collection to suggest osteomyelitis.

## 2020-07-11 DIAGNOSIS — Z71.89 OTHER SPECIFIED COUNSELING: ICD-10-CM

## 2020-07-11 LAB
ALBUMIN SERPL ELPH-MCNC: 2.6 G/DL — LOW (ref 3.3–5)
ALP SERPL-CCNC: 240 U/L — HIGH (ref 40–120)
ALT FLD-CCNC: 116 U/L — HIGH (ref 10–45)
AMMONIA BLD-MCNC: 41 UMOL/L — SIGNIFICANT CHANGE UP (ref 11–55)
ANION GAP SERPL CALC-SCNC: 9 MMOL/L — SIGNIFICANT CHANGE UP (ref 5–17)
APTT BLD: 27.1 SEC — LOW (ref 27.5–35.5)
AST SERPL-CCNC: 122 U/L — HIGH (ref 10–40)
BILIRUB DIRECT SERPL-MCNC: 0.6 MG/DL — HIGH (ref 0–0.2)
BILIRUB INDIRECT FLD-MCNC: 0.8 MG/DL — SIGNIFICANT CHANGE UP (ref 0.2–1)
BILIRUB SERPL-MCNC: 1.4 MG/DL — HIGH (ref 0.2–1.2)
BUN SERPL-MCNC: 25 MG/DL — HIGH (ref 7–23)
CALCIUM SERPL-MCNC: 8.2 MG/DL — LOW (ref 8.4–10.5)
CHLORIDE SERPL-SCNC: 97 MMOL/L — SIGNIFICANT CHANGE UP (ref 96–108)
CO2 SERPL-SCNC: 26 MMOL/L — SIGNIFICANT CHANGE UP (ref 22–31)
CREAT SERPL-MCNC: 1.23 MG/DL — SIGNIFICANT CHANGE UP (ref 0.5–1.3)
CULTURE RESULTS: NO GROWTH — SIGNIFICANT CHANGE UP
GLUCOSE BLDC GLUCOMTR-MCNC: 177 MG/DL — HIGH (ref 70–99)
GLUCOSE BLDC GLUCOMTR-MCNC: 194 MG/DL — HIGH (ref 70–99)
GLUCOSE BLDC GLUCOMTR-MCNC: 281 MG/DL — HIGH (ref 70–99)
GLUCOSE BLDC GLUCOMTR-MCNC: 324 MG/DL — HIGH (ref 70–99)
GLUCOSE SERPL-MCNC: 336 MG/DL — HIGH (ref 70–99)
HCT VFR BLD CALC: 30.7 % — LOW (ref 39–50)
HGB BLD-MCNC: 10.1 G/DL — LOW (ref 13–17)
INR BLD: 1.08 RATIO — SIGNIFICANT CHANGE UP (ref 0.88–1.16)
MAGNESIUM SERPL-MCNC: 1.8 MG/DL — SIGNIFICANT CHANGE UP (ref 1.6–2.6)
MCHC RBC-ENTMCNC: 31.9 PG — SIGNIFICANT CHANGE UP (ref 27–34)
MCHC RBC-ENTMCNC: 32.9 GM/DL — SIGNIFICANT CHANGE UP (ref 32–36)
MCV RBC AUTO: 96.8 FL — SIGNIFICANT CHANGE UP (ref 80–100)
NRBC # BLD: 0 /100 WBCS — SIGNIFICANT CHANGE UP (ref 0–0)
PHOSPHATE SERPL-MCNC: 2.1 MG/DL — LOW (ref 2.5–4.5)
PLATELET # BLD AUTO: 100 K/UL — LOW (ref 150–400)
POTASSIUM SERPL-MCNC: 5.3 MMOL/L — SIGNIFICANT CHANGE UP (ref 3.5–5.3)
POTASSIUM SERPL-SCNC: 5.3 MMOL/L — SIGNIFICANT CHANGE UP (ref 3.5–5.3)
PROT SERPL-MCNC: 4.7 G/DL — LOW (ref 6–8.3)
PROTHROM AB SERPL-ACNC: 12.3 SEC — SIGNIFICANT CHANGE UP (ref 10.6–13.6)
RBC # BLD: 3.17 M/UL — LOW (ref 4.2–5.8)
RBC # FLD: 18.6 % — HIGH (ref 10.3–14.5)
SODIUM SERPL-SCNC: 132 MMOL/L — LOW (ref 135–145)
SPECIMEN SOURCE: SIGNIFICANT CHANGE UP
TACROLIMUS SERPL-MCNC: 7.4 NG/ML — SIGNIFICANT CHANGE UP
WBC # BLD: 5.78 K/UL — SIGNIFICANT CHANGE UP (ref 3.8–10.5)
WBC # FLD AUTO: 5.78 K/UL — SIGNIFICANT CHANGE UP (ref 3.8–10.5)

## 2020-07-11 PROCEDURE — 99233 SBSQ HOSP IP/OBS HIGH 50: CPT | Mod: GC

## 2020-07-11 PROCEDURE — 99232 SBSQ HOSP IP/OBS MODERATE 35: CPT

## 2020-07-11 PROCEDURE — 71045 X-RAY EXAM CHEST 1 VIEW: CPT | Mod: 26

## 2020-07-11 PROCEDURE — 71260 CT THORAX DX C+: CPT | Mod: 26

## 2020-07-11 PROCEDURE — 74177 CT ABD & PELVIS W/CONTRAST: CPT | Mod: 26

## 2020-07-11 RX ORDER — SODIUM CHLORIDE 9 MG/ML
500 INJECTION INTRAMUSCULAR; INTRAVENOUS; SUBCUTANEOUS ONCE
Refills: 0 | Status: COMPLETED | OUTPATIENT
Start: 2020-07-11 | End: 2020-07-11

## 2020-07-11 RX ORDER — INSULIN LISPRO 100/ML
6 VIAL (ML) SUBCUTANEOUS
Refills: 0 | Status: DISCONTINUED | OUTPATIENT
Start: 2020-07-11 | End: 2020-07-12

## 2020-07-11 RX ORDER — MAGNESIUM SULFATE 500 MG/ML
2 VIAL (ML) INJECTION ONCE
Refills: 0 | Status: COMPLETED | OUTPATIENT
Start: 2020-07-11 | End: 2020-07-11

## 2020-07-11 RX ORDER — INSULIN GLARGINE 100 [IU]/ML
14 INJECTION, SOLUTION SUBCUTANEOUS AT BEDTIME
Refills: 0 | Status: DISCONTINUED | OUTPATIENT
Start: 2020-07-11 | End: 2020-07-12

## 2020-07-11 RX ORDER — TACROLIMUS 5 MG/1
2 CAPSULE ORAL ONCE
Refills: 0 | Status: COMPLETED | OUTPATIENT
Start: 2020-07-11 | End: 2020-07-11

## 2020-07-11 RX ORDER — MAGNESIUM OXIDE 400 MG ORAL TABLET 241.3 MG
800 TABLET ORAL
Refills: 0 | Status: DISCONTINUED | OUTPATIENT
Start: 2020-07-11 | End: 2020-08-11

## 2020-07-11 RX ORDER — TACROLIMUS 5 MG/1
2 CAPSULE ORAL
Refills: 0 | Status: DISCONTINUED | OUTPATIENT
Start: 2020-07-11 | End: 2020-07-12

## 2020-07-11 RX ADMIN — PANTOPRAZOLE SODIUM 40 MILLIGRAM(S): 20 TABLET, DELAYED RELEASE ORAL at 17:15

## 2020-07-11 RX ADMIN — Medication 5 UNIT(S): at 12:29

## 2020-07-11 RX ADMIN — Medication 100000 UNIT(S): at 05:12

## 2020-07-11 RX ADMIN — MEROPENEM 100 MILLIGRAM(S): 1 INJECTION INTRAVENOUS at 21:16

## 2020-07-11 RX ADMIN — Medication 50 GRAM(S): at 01:13

## 2020-07-11 RX ADMIN — SODIUM CHLORIDE 1000 MILLILITER(S): 9 INJECTION INTRAMUSCULAR; INTRAVENOUS; SUBCUTANEOUS at 19:33

## 2020-07-11 RX ADMIN — MEROPENEM 100 MILLIGRAM(S): 1 INJECTION INTRAVENOUS at 13:21

## 2020-07-11 RX ADMIN — Medication 1 TABLET(S): at 11:13

## 2020-07-11 RX ADMIN — QUETIAPINE FUMARATE 25 MILLIGRAM(S): 200 TABLET, FILM COATED ORAL at 21:15

## 2020-07-11 RX ADMIN — VALGANCICLOVIR 450 MILLIGRAM(S): 450 TABLET, FILM COATED ORAL at 11:13

## 2020-07-11 RX ADMIN — Medication 100000 UNIT(S): at 17:15

## 2020-07-11 RX ADMIN — Medication 1 APPLICATION(S): at 11:12

## 2020-07-11 RX ADMIN — Medication 81 MILLIGRAM(S): at 11:13

## 2020-07-11 RX ADMIN — Medication 3: at 17:16

## 2020-07-11 RX ADMIN — Medication 2: at 21:15

## 2020-07-11 RX ADMIN — Medication 100000 UNIT(S): at 11:12

## 2020-07-11 RX ADMIN — INSULIN GLARGINE 14 UNIT(S): 100 INJECTION, SOLUTION SUBCUTANEOUS at 21:15

## 2020-07-11 RX ADMIN — Medication 5 UNIT(S): at 08:25

## 2020-07-11 RX ADMIN — Medication 62.5 MILLIMOLE(S): at 01:13

## 2020-07-11 RX ADMIN — Medication 100000 UNIT(S): at 23:39

## 2020-07-11 RX ADMIN — LINEZOLID 300 MILLIGRAM(S): 600 INJECTION, SOLUTION INTRAVENOUS at 09:55

## 2020-07-11 RX ADMIN — Medication 1: at 12:28

## 2020-07-11 RX ADMIN — NYSTATIN CREAM 1 APPLICATION(S): 100000 CREAM TOPICAL at 17:17

## 2020-07-11 RX ADMIN — TACROLIMUS 2 MILLIGRAM(S): 5 CAPSULE ORAL at 19:35

## 2020-07-11 RX ADMIN — NYSTATIN CREAM 1 APPLICATION(S): 100000 CREAM TOPICAL at 05:12

## 2020-07-11 RX ADMIN — MEROPENEM 100 MILLIGRAM(S): 1 INJECTION INTRAVENOUS at 05:13

## 2020-07-11 RX ADMIN — TAMSULOSIN HYDROCHLORIDE 0.4 MILLIGRAM(S): 0.4 CAPSULE ORAL at 21:15

## 2020-07-11 RX ADMIN — TACROLIMUS 2 MILLIGRAM(S): 5 CAPSULE ORAL at 13:21

## 2020-07-11 RX ADMIN — SENNA PLUS 2 TABLET(S): 8.6 TABLET ORAL at 21:15

## 2020-07-11 RX ADMIN — CHLORHEXIDINE GLUCONATE 1 APPLICATION(S): 213 SOLUTION TOPICAL at 05:13

## 2020-07-11 RX ADMIN — PANTOPRAZOLE SODIUM 40 MILLIGRAM(S): 20 TABLET, DELAYED RELEASE ORAL at 05:12

## 2020-07-11 RX ADMIN — MAGNESIUM OXIDE 400 MG ORAL TABLET 800 MILLIGRAM(S): 241.3 TABLET ORAL at 17:15

## 2020-07-11 RX ADMIN — LINEZOLID 300 MILLIGRAM(S): 600 INJECTION, SOLUTION INTRAVENOUS at 21:16

## 2020-07-11 RX ADMIN — Medication 1: at 08:24

## 2020-07-11 RX ADMIN — Medication 6 UNIT(S): at 17:17

## 2020-07-11 NOTE — PROGRESS NOTE ADULT - PROBLEM SELECTOR PLAN 1
-test BG AC/HS  -Increase Lantus 14 units QHS  -Increase Humalog 6 units AC meals  -c/w Humalog low correction scale AC and Low HS scale  DISPO: basal/bolus, with doses TBD  -Pt can resume f/u with Dr. Mathur or   [facility] [address] [facility] [address] [place] [phone].

## 2020-07-11 NOTE — PROGRESS NOTE ADULT - ASSESSMENT
65 yo M with hx of T2DM uncontrolled due to steroids x 10 years with neuropathy and R DFU of the heel, HTN, HLD, MGUS, and decompensated JEAN cirrhosis s/p liver x-plant on 7/2/2020 admitted 6/11/2020 with confusion secondary to liver failure and encephalopathy. Endocrine consulted for glycemic control. S/p high dose steroid taper, now on Prednisone 5mg daily. Tolerating POs. No further hypoglycemia w/improving PO intake w/slow improvement in mental status. Will slightly increase basal/bolus for improved glycemic control. BG goal (100-180mg/dl).

## 2020-07-11 NOTE — PROGRESS NOTE ADULT - SUBJECTIVE AND OBJECTIVE BOX
Diabetes Follow up note:    Chief complaint: T2DM    Interval Hx: BG values 140s-mid 200s over past 24 hours. Pt seen at bedside. Mental status improving. Reports very good appetite and PO intake. Asking me when I will let him go home.     Review of Systems:  General: denies pain  GI: Tolerating POs. Denies N/V/D/Abd pain  CV: Denies CP/SOB  ENDO: No S&Sx of hypoglycemia  MEDS:  insulin glargine Injectable (LANTUS) 14 Unit(s) SubCutaneous at bedtime  insulin lispro (HumaLOG) corrective regimen sliding scale   SubCutaneous three times a day before meals  insulin lispro (HumaLOG) corrective regimen sliding scale   SubCutaneous at bedtime  insulin lispro Injectable (HumaLOG) 6 Unit(s) SubCutaneous three times a day before meals  predniSONE   Tablet 5 milliGRAM(s) Oral daily    linezolid  IVPB 600 milliGRAM(s) IV Intermittent every 12 hours  meropenem  IVPB 1000 milliGRAM(s) IV Intermittent every 8 hours  nystatin    Suspension 910527 Unit(s) Swish and Swallow four times a day  trimethoprim   80 mG/sulfamethoxazole 400 mG 1 Tablet(s) Oral daily  valGANciclovir 450 milliGRAM(s) Oral daily    Allergies    codeine (Anaphylaxis)    PE:  General: Male lying in bed. NAD.   Vital Signs Last 24 Hrs  T(C): 36.4 (11 Jul 2020 15:00), Max: 37 (10 Jul 2020 23:00)  T(F): 97.5 (11 Jul 2020 15:00), Max: 98.6 (10 Jul 2020 23:00)  HR: 81 (11 Jul 2020 15:00) (70 - 84)  BP: 128/60 (11 Jul 2020 15:00) (92/51 - 142/67)  BP(mean): 86 (11 Jul 2020 15:00) (67 - 97)  RR: 20 (11 Jul 2020 15:00) (0 - 28)  SpO2: 100% (11 Jul 2020 15:00) (82% - 100%)  Abd: Soft, NT,ND, Incisional dsg c/d/i  Extremities: Warm  Neuro: A&O X3    LABS:  POCT Blood Glucose.: 194 mg/dL (07-11-20 @ 12:20)  POCT Blood Glucose.: 177 mg/dL (07-11-20 @ 08:22)  POCT Blood Glucose.: 249 mg/dL (07-10-20 @ 21:15)  POCT Blood Glucose.: 146 mg/dL (07-10-20 @ 16:51)  POCT Blood Glucose.: 130 mg/dL (07-10-20 @ 12:38)  POCT Blood Glucose.: 123 mg/dL (07-10-20 @ 08:28)  POCT Blood Glucose.: 189 mg/dL (07-09-20 @ 22:14)  POCT Blood Glucose.: 170 mg/dL (07-09-20 @ 20:22)  POCT Blood Glucose.: 187 mg/dL (07-09-20 @ 17:14)  POCT Blood Glucose.: 184 mg/dL (07-09-20 @ 11:32)  POCT Blood Glucose.: 133 mg/dL (07-09-20 @ 07:18)  POCT Blood Glucose.: 132 mg/dL (07-08-20 @ 20:33)  POCT Blood Glucose.: 177 mg/dL (07-08-20 @ 17:21)                            9.7    5.58  )-----------( 73       ( 10 Jul 2020 22:30 )             29.5       07-10    134<L>  |  101  |  27<H>  ----------------------------<  258<H>  4.5   |  27  |  1.06    Ca    8.1<L>      10 Jul 2020 22:30  Phos  2.8     07-10  Mg     1.8     07-10    TPro  4.5<L>  /  Alb  2.6<L>  /  TBili  1.3<H>  /  DBili  0.6<H>  /  AST  25  /  ALT  75<H>  /  AlkPhos  116  07-10      A1C with Estimated Average Glucose Result: 5.2 % (06-14-20 @ 11:00)  A1C with Estimated Average Glucose Result: 5.4 % (06-12-20 @ 08:26)  A1C with Estimated Average Glucose Result: 4.8 % (04-26-20 @ 09:39)  Hemoglobin A1C, Whole Blood: 6.4 % (02-12-20 @ 17:08)  Hemoglobin A1C, Whole Blood: 5.8 % (12-04-19 @ 08:38)          Contact number: kit [phone] or [phone]

## 2020-07-11 NOTE — PROGRESS NOTE ADULT - ATTENDING COMMENTS
65 yo M with HLD, IDDM, GERD, HFpEF with mild LV diastolic dysfunction, chronic right foot ulcer, recurrent UTIs, and decompensated JEAN cirrhosis, s/p OLT 7/2 (standard criteria donor, standard vascular anastomoses with duct-to-duct biliary anastomosis over T-tube, CMV D+/R-, EBV D+/R+, received Simulect induction). Post-transplant course has been complicated by steroid-induced psychosis with hallucinations and delirium, tacrolimus neurotoxicity with tremors, anasarca / ascites, RLL pneumonia, and VRE bacteremia.    # S/p OLT, POD #9: Hepatic allograft is stable with improving liver tests. Continuing ASA for HAT prophylaxis.    # Immunosuppression: Continuing prednisone 5 mg po daily. MMF held given bacteremia. Continuing tacrolimus, but given active infection will aim for trough level ~8 ng/mL instead of 10 ng/mL as per discussion with Transplant Surgery today. Increasing magnesium supplementation today for goal Mg >2.    # Altered mental status: Delirium improving, but still disoriented this morning on interview. No recent reported hallucinations. Suspect steroid-related psychosis as well as possible ICU delirium. Continue quetiapine qhs for now and will minimize PRN Haldol.    # Tremulousness: Secondary to tacrolimus neurotoxicity, but appears to be gradually improving, possibly in context of lower recent tacrolimus levels. Will continue to monitor. If worsening, may need to switch to cyclosporine.    # Prophylaxis: Continuing Bactrim, Valcyte, and nystatin prophylaxis.    # RLL pneumonia and VRE bacteremia: Re-started on meropenem (7/9- ) and linezolid added (7/10- ), as per Transplant ID.    # Anasarca / ascites: Continuing diuresis with Lasix. Renal function stable.    # Chronic right heel ulcer: Does not appear infected. Continuing wound care.    # SHENG: Appears to be having apneic episodes while sleeping. Continue Bipap nightly.    # Debility: Daily PT.    Plan of care discussed in detail in multidisciplinary rounds with Transplant Surgery and SICU teams. Please don't hesitate to call with any questions/concerns.    Letitia Mo M.D., Ph.D.  Transplant Hepatology  Cell: [phone]

## 2020-07-11 NOTE — PROGRESS NOTE ADULT - SUBJECTIVE AND OBJECTIVE BOX
Transplant Surgery Progress Note    Transplant Surgery - Multidisciplinary Rounds  --------------------------------------------------------------  OLTx      Date:  7/2/2020       POD#9    Present: Patient seen with multidisciplinary team including Transplant Surgeon: Dr. Garcia, hepatologist Dr. Mo, hepatology fellow, and OLY Burciaga during am rounds and examined with Dr. Garcia. Disciplines not in attendance will be notified of the plan.     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     Re-admitted on 6/11:   ·	worsening HE  ·	UTI/VRE: tx with linezolid 6/15-22, bactrim DS 6/22 - 7/2  ·	MISA/Hyponatremia  ·	UGI bleed (melena) s/p EGD (6/22) c/w hemorrhagic duodenitis/gastritis; Grade 2 EV; requiring multiple transfusions  ·	Known h/o R foot ulcer (MRI neg for OM)  ·	s/p OLTx on 7/2/2020, post op liver doppler with patent vessels  ·	post op course c/b delirium     OR details:  - L groin cutdown for vv bypass   - anastomosis: bicaval end-end/CBD duct-duct/HA & PV end-end, all standard anatomy.    - IOC with good flow  - Simulect induction. 500mg Solumedrol  - JPs x3 with T-Tube  - 14U pRBC, 14U FFP, 10 PLT, 11 CRYO, 500mL returned from cell saver, EBL 5L, UOP 1100mL    Recipient Info: ABO: A  CMV:  Neg  EBV Positive    Donor:  Donor ID:  [id number] Match: [id number]  Age: 29 ABO:  A1 High Risk:   No COD: Cardiovascular  Anti CMV positive, EBV IgG- positive  HepBcAb-neg, Hepatitis C-DANA- neg, Hepatitis C ab-neg    Interval events:  - POD 9 s/p OLT with good graft function: LFTs downtrending; INR 1.1 T bili 1.3  - Remains delirious; however improved since yesterday. Appears less agitated and more conversive. Following commands on Seroquel   - Still having upper extremity tremors    -LE edema, received lasix 40 X2 yesterday.   -Bld cx from yesterday growing VRE, resumed on mari and also started linezolid. MMF held. MRSA swab neg.   -Tac level yesterday was 10.3, held AM dose yesterday and received tac 2.5 yesterday evening.   - H/H stable: 9/29.5      MEDICATIONS  (STANDING):  aspirin enteric coated 81 milliGRAM(s) Oral daily  chlorhexidine 2% Cloths 1 Application(s) Topical <User Schedule>  collagenase Ointment 1 Application(s) Topical daily  insulin glargine Injectable (LANTUS) 12 Unit(s) SubCutaneous at bedtime  insulin lispro (HumaLOG) corrective regimen sliding scale   SubCutaneous three times a day before meals  insulin lispro (HumaLOG) corrective regimen sliding scale   SubCutaneous at bedtime  insulin lispro Injectable (HumaLOG) 5 Unit(s) SubCutaneous three times a day before meals  linezolid  IVPB 600 milliGRAM(s) IV Intermittent every 12 hours  magnesium oxide 400 milliGRAM(s) Oral two times a day with meals  meropenem  IVPB 1000 milliGRAM(s) IV Intermittent every 8 hours  nystatin    Suspension 328241 Unit(s) Swish and Swallow four times a day  nystatin Cream 1 Application(s) Topical two times a day  pantoprazole  Injectable 40 milliGRAM(s) IV Push every 12 hours  polyethylene glycol 3350 17 Gram(s) Oral daily  predniSONE   Tablet 5 milliGRAM(s) Oral daily  QUEtiapine 25 milliGRAM(s) Oral at bedtime  senna 2 Tablet(s) Oral at bedtime  tamsulosin 0.4 milliGRAM(s) Oral at bedtime  trimethoprim   80 mG/sulfamethoxazole 400 mG 1 Tablet(s) Oral daily  valGANciclovir 450 milliGRAM(s) Oral daily    MEDICATIONS  (PRN):  glucagon  Injectable 1 milliGRAM(s) IntraMuscular once PRN Glucose <70 milliGRAM(s)/deciLiter      PAST MEDICAL & SURGICAL HISTORY:  GIB (gastrointestinal bleeding)  GERD with esophagitis: Gastritis &amp; Non Bleeding Ulcers  Hepatic encephalopathy  Obesity  Fatty liver disease, nonalcoholic  Renal stones: 25 years ago  Hypertension  Neuropathy  Hypercholesteremia  Diabetes  S/P cholecystectomy      Vital Signs Last 24 Hrs  T(C): 36.9 (11 Jul 2020 11:00), Max: 37 (10 Jul 2020 23:00)  T(F): 98.4 (11 Jul 2020 11:00), Max: 98.6 (10 Jul 2020 23:00)  HR: 79 (11 Jul 2020 11:00) (70 - 80)  BP: 120/73 (11 Jul 2020 11:00) (92/51 - 142/67)  BP(mean): 91 (11 Jul 2020 11:00) (67 - 97)  RR: 21 (11 Jul 2020 11:00) (5 - 35)  SpO2: 100% (11 Jul 2020 11:00) (93% - 100%)    I&O's Summary    10 Jul 2020 07:01  -  11 Jul 2020 07:00  --------------------------------------------------------  IN: 2000 mL / OUT: 3400 mL / NET: -1400 mL    11 Jul 2020 07:01  -  11 Jul 2020 11:35  --------------------------------------------------------  IN: 480 mL / OUT: 0 mL / NET: 480 mL                              9.7    5.58  )-----------( 73       ( 10 Jul 2020 22:30 )             29.5     07-10    134<L>  |  101  |  27<H>  ----------------------------<  258<H>  4.5   |  27  |  1.06    Ca    8.1<L>      10 Jul 2020 22:30  Phos  2.8     07-10  Mg     1.8     07-10    TPro  4.5<L>  /  Alb  2.6<L>  /  TBili  1.3<H>  /  DBili  0.6<H>  /  AST  25  /  ALT  75<H>  /  AlkPhos  116  07-10    Tacrolimus (), Serum: 7.4 ng/mL (07-11 @ 08:46)        Culture - Blood (collected 07-10-20 @ 00:22)  Source: .Blood Blood-Peripheral  Preliminary Report (07-11-20 @ 01:03):    No growth to date.    Culture - Blood (collected 07-10-20 @ 00:22)  Source: .Blood Blood-Peripheral  Gram Stain (07-10-20 @ 17:53):    Growth in aerobic bottle: Gram positive cocci in pairs  Preliminary Report (07-10-20 @ 17:54):    Growth in aerobic bottle: Gram positive cocci in pairs    "Due to technical problems, Proteus sp. will Not be reported as part of    the BCID panel until further notice"    ***Blood Panel PCR results on this specimen are available    approximately 3 hours after the Gram stain result.***    Gram stain, PCR, and/or culture results may not always    correspond due to difference in methodologies.    ************************************************************    This PCR assay was performed using [facility].    The following targets are tested for: Enterococcus,    vancomycin resistant enterococci, Listeria monocytogenes,    coagulase negative staphylococci, S. aureus,    methicillin resistant S. aureus, Streptococcus agalactiae    (Group B), S. pneumoniae, S. pyogenes (Group A),    Acinetobacter baumannii, Enterobacter cloacae, E. coli,    Klebsiella oxytoca, K. pneumoniae, Proteus sp.,    Serratia marcescens, Haemophilus influenzae,    Neisseria meningitidis, Pseudomonas aeruginosa, Candida    albicans, C. glabrata, C krusei, C parapsilosis,    C. tropicalis and the KPC resistance gene.  Organism: Blood Culture PCR (07-10-20 @ 20:21)  Organism: Blood Culture PCR (07-10-20 @ 20:21)            Physical exam:  Constitutional: restless  Eyes: mild icterus  ENMT: NC/AT  Neck: supple  Respiratory: CTA b/l  Cardiovascular: RRR  Gastrointestinal: Soft abdomen, ND, mild incisional tenderness, incision intact with staples, T-tube clammped.  ANDREINA SS output  Genitourinary: voiding spontaneously/condom cath  Extremities: SCD's in place and working bilaterally. L groin cutdown site c/d/i  Vascular: Palpable dp pulses bilaterally  Neurological: AO x 2; upper body tremors still present still little confused but not agitated today  Skin: no rashes, ulcerations, lesions post-op  Psychiatric: responsive

## 2020-07-11 NOTE — PROGRESS NOTE ADULT - ASSESSMENT
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    OLTx  - Good graft function, LFTs trending down  - Delirium ?Steroid psychosis; vs tacro sensitivity?, continue 1:1, cont Seroquel 25mg qhs.  - Immuno: FK level 7.4 today, give STAT dose of tac 2 now and start tac 2/2 starting tonight, MMF 1g bid, Pred taper,   - PPX: valcyte/nystatin/bactrim  - Continue ASA 81mg daily  - LE edema: little improved.   - ID: restarted on mari for concern for RLL infiltrate; CXR w/ improvement; 7/10 bld cx: VRE, resumed on mari, added linezolid. CT chest/abd/pelvis pending. MRSA swab neg. F/u sputum cx  - h/o UGI bleed: Continue Protonix 40mg IV bid    -Increase MgSo4 to 800mg BID, keep Mag >2  - Regular, carbohydrate restricted diet  - Pain control PRN  - DVT PPx: SCDs at all times      Hyperglycemia  - In setting of steroid taper  - Appreciate endocrinology recommendations  - ResumeLantus and Humalog insulin wih SSI coverage  - Fingersticks ac and qhs    Hypertension  -Controlled  -Continue Nifedipine    -Heel ulcer  Continue local wound care w/ santyl and DSD as per podiatry    Continue ICU care

## 2020-07-11 NOTE — PROGRESS NOTE ADULT - ASSESSMENT
ASSESSMENT:  64y Male c/b small esophageal varices (12/2019), portal hypertensive gastropathy, ascites, hx SBP, hx hepatic encephalopathy, GAVE syndrome s/p APC, hx duodenal ulcer (5/2019), HTN, HLD, DM, HFpEF (EF 70%), recent ESBL E coli prostate abscess (3/2020 s/p 4 weeks ertapenem), orthostatic hypotension ( on midodrine) who was initially admitted to medicine for hepatic encephalopathy. Hospital course c/b MISA, VRE UTI, acute blood loss anemia, received blood transfusion, EGD done 6/22 with Grade II esophageal varices, acute gastritis with hemorrhage and acute duodenitis with hemorrhage. Patient had worsening mental status, concerning decompensating liver cirrhosis, hepatic encephalopathy patient then transferred to SICU for further care.    PLAN:   Neurologic: tremors, hallucinations, confusion 2/2 ?tacro toxicity  - Neuro status worsening, appears more confused; oxycodone discontinued  - haldol prn   - Promote sleep hygiene nocturnal CPAP ordered to improve sleep and prevent sleep apnea   - Seroquel vs precedex at bedtime    Respiratory: RLL infiltrate developing  - IS, OOBTC as tolerated  - Nocturnal BiPap 10/5/12/35%  - F/u sputum Cx  - MRSA swab negative   - f/u AM CXR   - Continuous pulse oximetry    Cardiovascular: Intermittent hypertension  - Nifedipine 30mg daily  - Labetalol PRN for SBP goal <145  - ASA  - Monitor hemodynamic status    Gastrointestinal/Nutrition: GIB, JEAN cirrhosis, GAVE syndrome. S/p liver transplant   - Regular, consistent carb diet. Tolerating well.  - Protonix 40mg BID  - Trend LFTs    Renal: urinary retention, hyponatremia  - C/w flomax for urinary retention  - Trend BMPs, replete lytes prn  - Lasix prn     Heme: coagulopathy  - Venodynes for mechanical VTE prophylaxis, holding chemical VTE prophylaxis in the setting of coagulopathy/GIB.  - Trend H/H, coags    Infectious Disease: immunosuppresion; possible RLL PNA  - VRE on 7/10 bcx   - F/u on final bcx. repeat bcx today   - Conitnue meropenem, linezolid   - Continue PPx as per transplant Bactrim, valcyte  - Continue immunosuppression as per transplant solumedrol, tacro. Cellcept held yesterday.     Endo: DM type II, HLD  - ISS, lantus 12u qhs, 5u premeal/qhs insulin  - Monitor FS premeal and qhs    Disposition:  - SICU full code      SICU   [id number] ASSESSMENT:  64y Male c/b small esophageal varices (12/2019), portal hypertensive gastropathy, ascites, hx SBP, hx hepatic encephalopathy, GAVE syndrome s/p APC, hx duodenal ulcer (5/2019), HTN, HLD, DM, HFpEF (EF 70%), recent ESBL E coli prostate abscess (3/2020 s/p 4 weeks ertapenem), orthostatic hypotension ( on midodrine) who was initially admitted to medicine for hepatic encephalopathy. Hospital course c/b MISA, VRE UTI, acute blood loss anemia, received blood transfusion, EGD done 6/22 with Grade II esophageal varices, acute gastritis with hemorrhage and acute duodenitis with hemorrhage. Patient had worsening mental status, concerning decompensating liver cirrhosis, hepatic encephalopathy patient then transferred to SICU for further care.    PLAN:   Neurologic: tremors, hallucinations, confusion 2/2 ?tacro toxicity  - Neuro status worsening, appears more confused; oxycodone discontinued  - haldol prn   - Promote sleep hygiene nocturnal CPAP ordered to improve sleep and prevent sleep apnea   - Seroquel vs precedex at bedtime    Respiratory: RLL infiltrate developing  - IS, OOBTC as tolerated  - Nocturnal BiPap 10/5/12/35%  - F/u sputum Cx  - MRSA swab negative   - f/u AM CXR   - Continuous pulse oximetry    Cardiovascular: Intermittent hypertension  - Nifedipine 30mg daily  - Labetalol PRN for SBP goal <145  - ASA  - Monitor hemodynamic status    Gastrointestinal/Nutrition: GIB, JEAN cirrhosis, GAVE syndrome. S/p liver transplant   - Regular, consistent carb diet. Tolerating well.  - Protonix 40mg BID  - Trend LFTs    Renal: urinary retention, hyponatremia  - C/w flomax for urinary retention  - Trend BMPs, replete lytes prn  - Lasix prn     Heme: coagulopathy  - Venodynes for mechanical VTE prophylaxis, holding chemical VTE prophylaxis in the setting of coagulopathy/GIB.  - Trend H/H, coags    Infectious Disease: immunosuppresion; possible RLL PNA  - VRE on 7/10 bcx   - F/u on final bcx. repeat bcx today   - plan for CT C/A/P   - Conitnue meropenem, linezolid   - Continue PPx as per transplant Bactrim, valcyte  - Continue immunosuppression as per transplant solumedrol, tacro. Cellcept held yesterday.     Endo: DM type II, HLD  - ISS, lantus 12u qhs, 5u premeal/qhs insulin  - Monitor FS premeal and qhs    Disposition:  - SICU full code      SICU   [id number]

## 2020-07-11 NOTE — PROGRESS NOTE ADULT - SUBJECTIVE AND OBJECTIVE BOX
HISTORY  64y Male c/b small esophageal varices (12/2019), portal hypertensive gastropathy, ascites, hx SBP, hx hepatic encephalopathy, GAVE syndrome s/p APC, hx duodenal ulcer (5/2019), HTN, HLD, DM, HFpEF (EF 70%), recent ESBL E coli prostate abscess (3/2020 s/p 4 weeks ertapenem), orthostatic hypotension ( on midodrine) who was initially admitted to medicine for hepatic encephalopathy. Hospital course c/b MISA, VRE UTI, acute blood loss anemia, received blood transfusion, EGD done 6/22 with Grade II esophageal varices, acute gastritis with hemorrhage and acute duodenitis with hemorrhage. Patient had worsening mental status, concerning decompensating liver cirrhosis, hepatic encephalopathy patient then transferred to SICU for further care.    24 HOUR EVENTS:  - mental status improving   - cellcept discontinued   - lasix 40mg PO x2   - bcx 7/10 with GPC in pairs,overnight bcx speciated, growing VRE. Added linezolid per ID    - MRSA not detected on swab       SUBJECTIVE/ROS:  [ ] A ten-point review of systems was otherwise negative except as noted.  [ ] Due to altered mental status/intubation, subjective information were not able to be obtained from the patient. History was obtained, to the extent possible, from review of the chart and collateral sources of information.      XAM  NEUROLOGY  Exam: Confused, altered mental status, Oriented x 3, no focal deficits, intermittently agitated     HEENT  Exam: Normocephalic, atraumatic    RESPIRATORY  Exam: Lungs clear to auscultation, no wheezes/rhonchi/rales, Normal expansion/effort.   Mechanical Ventilation: N/A    CARDIOVASCULAR  Exam: S1, S2.  Regular rate and rhythm.    GI/NUTRITION  Exam: Abdomen soft, Non-tender, Non-distended.    Wound: Dressing c/d/i  Current Diet:  Regular, Consistent Carb    VASCULAR  Exam: Extremities warm, pink, well-perfused.     MUSCULOSKELETAL  Exam: All extremities moving spontaneously without limitations. Pitting edema in bilateral lower extremities     SKIN  Exam: Good skin turgor, no skin breakdown.     METABOLIC/FLUIDS/ELECTROLYTES  magnesium oxide 400 milliGRAM(s) Oral two times a day with meals      HEMATOLOGIC  [x] VTE Prophylaxis: aspirin enteric coated 81 milliGRAM(s) Oral daily    Transfusions:	[] PRBC	[] Platelets		[] FFP	[] Cryoprecipitate    INFECTIOUS DISEASE  Antimicrobials/Immunologic Medications:  meropenem  IVPB 1000 milliGRAM(s) IV Intermittent every 8 hours  mycophenolate mofetil 500 milliGRAM(s) Oral <User Schedule>  nystatin    Suspension 591736 Unit(s) Swish and Swallow four times a day  tacrolimus 2.5 milliGRAM(s) Oral <User Schedule>  trimethoprim   80 mG/sulfamethoxazole 400 mG 1 Tablet(s) Oral daily  valGANciclovir 450 milliGRAM(s) Oral daily      Tubes/Lines/Drains   [x] Peripheral IV  [] Central Venous Line     	[] R	[] L	[] IJ	[] Fem	[] SC	Date Placed:   [] Arterial Line		[] R	[] L	[] Fem	[] Rad	[] Ax	Date Placed:   [] PICC		[] Midline		[] Mediport        07-09 @ 07:01  -  07-10 @ 07:00  --------------------------------------------------------  IN:    Oral Fluid: 1295 mL  Total IN: 1295 mL    OUT:    Drain: 480 mL    Incontinent per Condom Catheter: 3125 mL  Total OUT: 3605 mL    Total NET: -2310 mL      07-10 @ 07:01 - 07-11 @ 00:43  --------------------------------------------------------  IN:    Oral Fluid: 750 mL    Solution: 700 mL  Total IN: 1450 mL    OUT:    Drain: 305 mL    Incontinent per Condom Catheter: 1300 mL    Intermittent Catheterization - Urethral: 650 mL  Total OUT: 2255 mL    Total NET: -805 mL          07-10    134<L>  |  101  |  27<H>  ----------------------------<  258<H>  4.5   |  27  |  1.06    Ca    8.1<L>      10 Jul 2020 22:30  Phos  2.8     07-10  Mg     1.8     07-10    TPro  4.5<L>  /  Alb  2.6<L>  /  TBili  1.3<H>  /  DBili  0.6<H>  /  AST  25  /  ALT  75<H>  /  AlkPhos  116  07-10    [ ] Beasley catheter, indication:   Meds: magnesium oxide 400 milliGRAM(s) Oral two times a day with meals        HEMATOLOGIC  Meds: aspirin enteric coated 81 milliGRAM(s) Oral daily    [x] VTE Prophylaxis                        9.7    5.58  )-----------( 73       ( 10 Jul 2020 22:30 )             29.5     PT/INR - ( 10 Jul 2020 22:30 )   PT: 13.0 sec;   INR: 1.14 ratio         PTT - ( 10 Jul 2020 22:30 )  PTT:28.3 sec  Transfusion     [ ] PRBC   [ ] Platelets   [ ] FFP   [ ] Cryoprecipitate      INFECTIOUS DISEASES  T(C): 37 (07-10-20 @ 23:00), Max: 37 (07-10-20 @ 23:00)  Wt(kg): --  WBC Count: 5.58 K/uL (07-10 @ 22:30)  WBC Count: 5.22 K/uL (07-10 @ 06:45)    Recent Cultures:  Specimen Source: .Blood Blood-Peripheral, 07-10 @ 00:22; Results   Growth in aerobic bottle: Gram positive cocci in pairs  "Due to technical problems, Proteus sp. will Not be reported as part of  the BCID panel until further notice"  ***Blood Panel PCR results on this specimen are available  approximately 3 hours after the Gram stain result.***  Gram stain, PCR, and/or culture results may not always  correspond due to difference in methodologies.  ************************************************************  This PCR assay was performed using [employer].  The following targets are tested for: Enterococcus,  vancomycin resistant enterococci, Listeria monocytogenes,  coagulase negative staphylococci, S. aureus,  methicillin resistant S. aureus, Streptococcus agalactiae  (Group B), S. pneumoniae, S. pyogenes (Group A),  Acinetobacter baumannii, Enterobacter cloacae, E. coli,  Klebsiella oxytoca, K. pneumoniae, Proteus sp.,  Serratia marcescens, Haemophilus influenzae,  Neisseria meningitidis, Pseudomonas aeruginosa, Candida  albicans, C. glabrata, C krusei, C parapsilosis,  C. tropicalis and the KPC resistance gene.; Gram Stain:   Growth in aerobic bottle: Gram positive cocci in pairs; Organism: Blood Culture PCR    Meds: linezolid  IVPB 600 milliGRAM(s) IV Intermittent every 12 hours  meropenem  IVPB 1000 milliGRAM(s) IV Intermittent every 8 hours  nystatin    Suspension 112170 Unit(s) Swish and Swallow four times a day  trimethoprim   80 mG/sulfamethoxazole 400 mG 1 Tablet(s) Oral daily  valGANciclovir 450 milliGRAM(s) Oral daily        ENDOCRINE  Capillary Blood Glucose    Meds: glucagon  Injectable 1 milliGRAM(s) IntraMuscular once PRN  insulin glargine Injectable (LANTUS) 12 Unit(s) SubCutaneous at bedtime  insulin lispro (HumaLOG) corrective regimen sliding scale   SubCutaneous three times a day before meals  insulin lispro (HumaLOG) corrective regimen sliding scale   SubCutaneous at bedtime  insulin lispro Injectable (HumaLOG) 5 Unit(s) SubCutaneous three times a day before meals  predniSONE   Tablet 5 milliGRAM(s) Oral daily    CODE STATUS:     IMAGING:    EXAM:  XR CHEST PORTABLE ROUTINE 1V                            PROCEDURE DATE:  07/10/2020            INTERPRETATION:  CLINICAL INFORMATION: Right lower lobe infiltrate..    A frontal view of the chest was obtained.     Comparison: 1220.     IMPRESSION:    The mediastinal cardiac silhouette is unremarkable.    Previously seen right lower lobe opacity has improved with only a small linear marking remaining, likely atelectasis. Clear left lung.    No acute osseous finding.

## 2020-07-11 NOTE — PROGRESS NOTE ADULT - ASSESSMENT
imp/rx:    s/p OLT and overall well.  Normal mental status.  Low grade VRE bacteremia  well covered on linezolid and meropenem.  to continue same coverage in addition to bactrim and valcyte.    repeat blood cultures to demonstrate clearance. Yes

## 2020-07-11 NOTE — PROGRESS NOTE ADULT - PROBLEM SELECTOR PLAN 3
: -defer statin in the setting of recent liver x-plant    discussed plan w/pt and team  pager: [phone]   cell: [phone]  office: [phone]    -I spent a total time of 26 mins with the patient at bedside/on unit of which more than 50% of time was spent on counseling/coordination of care.

## 2020-07-11 NOTE — PROGRESS NOTE ADULT - ATTENDING COMMENTS
POD#9 s/p OLT, complicated by steroid-induced psychosis with hallucinations and delirium, tacrolimus neurotoxicity with tremors, and now VRE bacteremia  Started on linezolid last night  Patient seen this morning, sitting in chair eating breakfast without assistance.  hallucinations improving, but still agitated and has tremors  Cont current immunosuppression with Prograf 2 mg bid, Prednisone 5 mg daily, Cellcept held.   Will check tacrolimus level and adjust dose accordingly.  Transplant Prophylaxis with nystatin, bactrim, valcyte  PPI due to steroids  Abx as per ID for pneumonia and VRE bacteremia. f/u CT chest/abdomen

## 2020-07-11 NOTE — PROGRESS NOTE ADULT - SUBJECTIVE AND OBJECTIVE BOX
INFECTIOUS DISEASES FOLLOW UP--Surendra Palmer MD  Pager [phone]    This is a follow up note for this  64y Male with OLT.  Now with VRE in blood.  No new complaints.  No fevers.  appetite good.    Further ROS:  CONSTITUTIONAL:  No fever, good appetite  CARDIOVASCULAR:  No chest pain or palpitations  RESPIRATORY:  No dyspnea  GASTROINTESTINAL:  No nausea, vomiting, diarrhea, or abdominal pain  GENITOURINARY:  No dysuria  NEUROLOGIC:  No headache,     Allergies  codeine (Anaphylaxis)    ANTIBIOTICS/RELEVANT:  antimicrobials  linezolid  IVPB 600 milliGRAM(s) IV Intermittent every 12 hours  meropenem  IVPB 1000 milliGRAM(s) IV Intermittent every 8 hours  nystatin    Suspension 543200 Unit(s) Swish and Swallow four times a day  trimethoprim   80 mG/sulfamethoxazole 400 mG 1 Tablet(s) Oral daily  valGANciclovir 450 milliGRAM(s) Oral daily    OTHER:  aspirin enteric coated 81 milliGRAM(s) Oral daily  chlorhexidine 2% Cloths 1 Application(s) Topical <User Schedule>  collagenase Ointment 1 Application(s) Topical daily  glucagon  Injectable 1 milliGRAM(s) IntraMuscular once PRN  insulin glargine Injectable (LANTUS) 12 Unit(s) SubCutaneous at bedtime  insulin lispro (HumaLOG) corrective regimen sliding scale   SubCutaneous three times a day before meals  insulin lispro (HumaLOG) corrective regimen sliding scale   SubCutaneous at bedtime  insulin lispro Injectable (HumaLOG) 5 Unit(s) SubCutaneous three times a day before meals  magnesium oxide 400 milliGRAM(s) Oral two times a day with meals  NIFEdipine XL 30 milliGRAM(s) Oral daily  nystatin Cream 1 Application(s) Topical two times a day  pantoprazole  Injectable 40 milliGRAM(s) IV Push every 12 hours  polyethylene glycol 3350 17 Gram(s) Oral daily  predniSONE   Tablet 5 milliGRAM(s) Oral daily  QUEtiapine 25 milliGRAM(s) Oral at bedtime  senna 2 Tablet(s) Oral at bedtime  tamsulosin 0.4 milliGRAM(s) Oral at bedtime    Objective:  Vital Signs Last 24 Hrs  T(C): 36.5 (2020 08:00), Max: 37 (10 Jul 2020 23:00)  T(F): 97.7 (2020 08:00), Max: 98.6 (10 Jul 2020 23:00)  HR: 80 (2020 09:06) (70 - 80)  BP: 92/51 (2020 09:00) (92/51 - 142/67)  BP(mean): 67 (2020 09:00) (67 - 97)  RR: 20 (2020 09:00) (5 - 35)  SpO2: 100% (2020 09:06) (93% - 100%)    PHYSICAL EXAM:  Constitutional:no acute distress  no scleral icterus  Ear/Nose/Throat: no oral lesions, 	  Respiratory: clear BL  Cardiovascular: S1S2  Gastrointestinal:soft, (+) BS, no tenderness  Extremities:no e/e/c  No Lymphadenopathy  IV sites not inflammed.    LABS:                        9.7    5.58  )-----------( 73       ( 10 Jul 2020 22:30 )             29.5     07-10    134<L>  |  101  |  27<H>  ----------------------------<  258<H>  4.5   |  27  |  1.06    Ca    8.1<L>      10 Jul 2020 22:30  Phos  2.8     07-10  Mg     1.8     07-10    TPro  4.5<L>  /  Alb  2.6<L>  /  TBili  1.3<H>  /  DBili  0.6<H>  /  AST  25  /  ALT  75<H>  /  AlkPhos  116  07-10  PT/INR - ( 10 Jul 2020 22:30 )   PT: 13.0 sec;   INR: 1.14 ratio      PTT - ( 10 Jul 2020 22:30 )  PTT:28.3 sec  Urinalysis Basic - ( 10 Jul 2020 03:23 )    Color: Light Yellow / Appearance: Clear / S.011 / pH: x  Gluc: x / Ketone: Negative  / Bili: Negative / Urobili: Negative   Blood: x / Protein: Negative / Nitrite: Negative   Leuk Esterase: Negative / RBC: 2 /hpf / WBC 1 /HPF   Sq Epi: x / Non Sq Epi: 0 /hpf / Bacteria: 0.0    MICROBIOLOGY: BC  VRE    RADIOLOGY & ADDITIONAL STUDIES:    < from: Xray Chest 1 View- PORTABLE-Routine (07.10.20 @ 07:26) >    The mediastinal cardiac silhouette is unremarkable.    Previously seen right lower lobe opacity has improved with only a small linear marking remaining, likely atelectasis. Clear left lung.    No acute osseous finding.     < end of copied text >

## 2020-07-11 NOTE — PROGRESS NOTE ADULT - SUBJECTIVE AND OBJECTIVE BOX
INTERVAL HPI/OVERNIGHT EVENTS:  lethargic  MEDICATIONS  (STANDING):  aspirin enteric coated 81 milliGRAM(s) Oral daily  chlorhexidine 2% Cloths 1 Application(s) Topical <User Schedule>  collagenase Ointment 1 Application(s) Topical daily  insulin glargine Injectable (LANTUS) 14 Unit(s) SubCutaneous at bedtime  insulin lispro (HumaLOG) corrective regimen sliding scale   SubCutaneous three times a day before meals  insulin lispro (HumaLOG) corrective regimen sliding scale   SubCutaneous at bedtime  insulin lispro Injectable (HumaLOG) 6 Unit(s) SubCutaneous three times a day before meals  linezolid  IVPB 600 milliGRAM(s) IV Intermittent every 12 hours  magnesium oxide 800 milliGRAM(s) Oral two times a day with meals  meropenem  IVPB 1000 milliGRAM(s) IV Intermittent every 8 hours  nystatin    Suspension 433393 Unit(s) Swish and Swallow four times a day  nystatin Cream 1 Application(s) Topical two times a day  pantoprazole  Injectable 40 milliGRAM(s) IV Push every 12 hours  polyethylene glycol 3350 17 Gram(s) Oral daily  predniSONE   Tablet 5 milliGRAM(s) Oral daily  QUEtiapine 25 milliGRAM(s) Oral at bedtime  senna 2 Tablet(s) Oral at bedtime  tacrolimus 2 milliGRAM(s) Oral <User Schedule>  tamsulosin 0.4 milliGRAM(s) Oral at bedtime  trimethoprim   80 mG/sulfamethoxazole 400 mG 1 Tablet(s) Oral daily  valGANciclovir 450 milliGRAM(s) Oral daily    MEDICATIONS  (PRN):  glucagon  Injectable 1 milliGRAM(s) IntraMuscular once PRN Glucose <70 milliGRAM(s)/deciLiter      Allergies    codeine (Anaphylaxis)    Intolerances        Review of Systems:    General:  No wt loss, fevers, chills, night sweats,fatigue,   Eyes:  Good vision, no reported pain  ENT:  No sore throat, pain, runny nose, dysphagia  CV:  No pain, palpitatioins, hypo/hypertension  Resp:  No dyspnea, cough, tachypnea, wheezing  GI:  No pain, No nausea, No vomiting, No diarrhea, No constipatiion, No weight loss, No fever, No pruritis, No rectal bleeding, No tarry stools, No dysphagia,  :  No pain, bleeding, incontinence, nocturia  Muscle:  No pain, weakness  Neuro:  No weakness, tingling, memory problems  Psych:  No fatigue, insomnia, mood problems, depression  Endocrine:  No polyuria, polydypsia, cold/heat intolerance  Heme:  No petechiae, ecchymosis, easy bruisability  Skin:  No rash, tattoos, scars, edema      Vital Signs Last 24 Hrs  T(C): 36.4 (2020 15:00), Max: 37 (10 Jul 2020 23:00)  T(F): 97.5 (2020 15:00), Max: 98.6 (10 Jul 2020 23:00)  HR: 80 (2020 18:00) (70 - 84)  BP: 99/75 (:00) (92/51 - 142/67)  BP(mean): 83 (2020 18:00) (67 - 97)  RR: 22 (:) (0 - 28)  SpO2: 100% (:) (82% - 100%)    PHYSICAL EXAM:    Constitutional: NAD, well-developed  HEENT: EOMI, throat clear  Neck: No LAD, supple  Respiratory: CTA and P  Cardiovascular: S1 and S2, RRR, no M  Gastrointestinal: BS+, soft, NT/ND, neg HSM,  Extremities: No peripheral edema, neg clubing, cyanosis  Vascular: 2+ peripheral pulses  Neurological: A/O x 3, no focal deficits  Psychiatric: Normal mood, normal affect  Skin: No rashes      LABS:                        9.7    5.58  )-----------( 73       ( 10 Jul 2020 22:30 )             29.5     07-10    134<L>  |  101  |  27<H>  ----------------------------<  258<H>  4.5   |  27  |  1.06    Ca    8.1<L>      10 Jul 2020 22:30  Phos  2.8     07-10  Mg     1.8     07-10    TPro  4.5<L>  /  Alb  2.6<L>  /  TBili  1.3<H>  /  DBili  0.6<H>  /  AST  25  /  ALT  75<H>  /  AlkPhos  116  07-10    PT/INR - ( 10 Jul 2020 22:30 )   PT: 13.0 sec;   INR: 1.14 ratio         PTT - ( 10 Jul 2020 22:30 )  PTT:28.3 sec  Urinalysis Basic - ( 10 Jul 2020 03:23 )    Color: Light Yellow / Appearance: Clear / S.011 / pH: x  Gluc: x / Ketone: Negative  / Bili: Negative / Urobili: Negative   Blood: x / Protein: Negative / Nitrite: Negative   Leuk Esterase: Negative / RBC: 2 /hpf / WBC 1 /HPF   Sq Epi: x / Non Sq Epi: 0 /hpf / Bacteria: 0.0        RADIOLOGY & ADDITIONAL TESTS:

## 2020-07-11 NOTE — PROGRESS NOTE ADULT - SUBJECTIVE AND OBJECTIVE BOX
Chief Complaint: Confusion  Reason for consult: PSE, s/p OLT    Interval Events  - Did not receive Haldol overnight  - Remains altered  - Continues to have bilateral upper extremity tremors  - Linezolid started for VRE in blood cultures. MMF held. Meropenem continued  - Tacrolimus was held yesterday morning. Received Tacrolimus 2.5 last night.    Allergies:  codeine (Anaphylaxis)    MEDICATIONS  (STANDING):  aspirin enteric coated 81 milliGRAM(s) Oral daily  chlorhexidine 2% Cloths 1 Application(s) Topical <User Schedule>  collagenase Ointment 1 Application(s) Topical daily  insulin glargine Injectable (LANTUS) 14 Unit(s) SubCutaneous at bedtime  insulin lispro (HumaLOG) corrective regimen sliding scale   SubCutaneous three times a day before meals  insulin lispro (HumaLOG) corrective regimen sliding scale   SubCutaneous at bedtime  insulin lispro Injectable (HumaLOG) 6 Unit(s) SubCutaneous three times a day before meals  linezolid  IVPB 600 milliGRAM(s) IV Intermittent every 12 hours  magnesium oxide 800 milliGRAM(s) Oral two times a day with meals  meropenem  IVPB 1000 milliGRAM(s) IV Intermittent every 8 hours  nystatin    Suspension 283382 Unit(s) Swish and Swallow four times a day  nystatin Cream 1 Application(s) Topical two times a day  pantoprazole  Injectable 40 milliGRAM(s) IV Push every 12 hours  polyethylene glycol 3350 17 Gram(s) Oral daily  predniSONE   Tablet 5 milliGRAM(s) Oral daily  QUEtiapine 25 milliGRAM(s) Oral at bedtime  senna 2 Tablet(s) Oral at bedtime  tacrolimus 2 milliGRAM(s) Oral <User Schedule>  tamsulosin 0.4 milliGRAM(s) Oral at bedtime  trimethoprim   80 mG/sulfamethoxazole 400 mG 1 Tablet(s) Oral daily  valGANciclovir 450 milliGRAM(s) Oral daily    MEDICATIONS  (PRN):  glucagon  Injectable 1 milliGRAM(s) IntraMuscular once PRN Glucose <70 milliGRAM(s)/deciLiter    PMHX/PSHX:  GIB (gastrointestinal bleeding)  GERD with esophagitis  Hepatic encephalopathy  Obesity  Fatty liver disease, nonalcoholic  Renal stones  Hypertension  Neuropathy  Hypercholesteremia  Diabetes  S/P cholecystectomy    Family history:  Family history of type 2 diabetes mellitus  Family history of hypertension  Family history of stomach cancer  No pertinent family history in first degree relatives    ROS: As per HPI, 14-point ROS negative otherwise.    PHYSICAL EXAM:     Vital Signs:  Vital Signs Last 24 Hrs  T(C): 36.9 (2020 11:00), Max: 37 (10 Jul 2020 23:00)  T(F): 98.4 (2020 11:00), Max: 98.6 (10 Jul 2020 23:00)  HR: 84 (:) (70 - 84)  BP: 108/65 (:) (92/51 - 142/67)  BP(mean): 82 (:) (67 - 97)  RR: 21 (:) (0 - 28)  SpO2: 100% (:) (82% - 100%)  Daily Weight in k.4 (2020 05:56)    GENERAL: no acute distress  NEURO: alert, AAOx1, +tremors, answers questions and follows commands  HEENT: anicteric sclera, no conjunctival pallor appreciated  CHEST: no respiratory distress, no accessory muscle use  CARDIAC: regular rate, rhythm  ABDOMEN: soft, incisions intact, ANDREINA drains in place w/ serosanguinous drainage  EXTREMITIES: warm, well perfused, trace/1+ BLLE edema  SKIN: R heel with small open wound with overlying dressing, no purulence    LABS:                        9.7    5.58  )-----------( 73       ( 10 Jul 2020 22:30 )             29.5     07-10    134<L>  |  101  |  27<H>  ----------------------------<  258<H>  4.5   |  27  |  1.06    Ca    8.1<L>      10 Jul 2020 22:30  Phos  2.8     07-10  Mg     1.8     07-10    TPro  4.5<L>  /  Alb  2.6<L>  /  TBili  1.3<H>  /  DBili  0.6<H>  /  AST  25  /  ALT  75<H>  /  AlkPhos  116  07-10    LIVER FUNCTIONS - ( 10 Jul 2020 22:30 )  Alb: 2.6 g/dL / Pro: 4.5 g/dL / ALK PHOS: 116 U/L / ALT: 75 U/L / AST: 25 U/L / GGT: x           PT/INR - ( 10 Jul 2020 22:30 )   PT: 13.0 sec;   INR: 1.14 ratio         PTT - ( 10 Jul 2020 22:30 )  PTT:28.3 sec  Urinalysis Basic - ( 10 Jul 2020 03:23 )    Color: Light Yellow / Appearance: Clear / S.011 / pH: x  Gluc: x / Ketone: Negative  / Bili: Negative / Urobili: Negative   Blood: x / Protein: Negative / Nitrite: Negative   Leuk Esterase: Negative / RBC: 2 /hpf / WBC 1 /HPF   Sq Epi: x / Non Sq Epi: 0 /hpf / Bacteria: 0.0      Amylase Serum--      Lipase serum--       [id number]    Imaging:    No new imaging Chief Complaint: Confusion  Reason for consult: PSE, s/p OLT    Interval Events  - Did not receive Haldol overnight  - Mental status improving but still intermittently confused  - Continues to have bilateral upper extremity tremors  - Linezolid started for VRE in blood cultures. MMF held. Meropenem continued  - Tacrolimus was held yesterday morning. Received tacrolimus 2.5 mg last night.    Allergies:  codeine (Anaphylaxis)    MEDICATIONS  (STANDING):  aspirin enteric coated 81 milliGRAM(s) Oral daily  chlorhexidine 2% Cloths 1 Application(s) Topical <User Schedule>  collagenase Ointment 1 Application(s) Topical daily  insulin glargine Injectable (LANTUS) 14 Unit(s) SubCutaneous at bedtime  insulin lispro (HumaLOG) corrective regimen sliding scale   SubCutaneous three times a day before meals  insulin lispro (HumaLOG) corrective regimen sliding scale   SubCutaneous at bedtime  insulin lispro Injectable (HumaLOG) 6 Unit(s) SubCutaneous three times a day before meals  linezolid  IVPB 600 milliGRAM(s) IV Intermittent every 12 hours  magnesium oxide 800 milliGRAM(s) Oral two times a day with meals  meropenem  IVPB 1000 milliGRAM(s) IV Intermittent every 8 hours  nystatin    Suspension 560317 Unit(s) Swish and Swallow four times a day  nystatin Cream 1 Application(s) Topical two times a day  pantoprazole  Injectable 40 milliGRAM(s) IV Push every 12 hours  polyethylene glycol 3350 17 Gram(s) Oral daily  predniSONE   Tablet 5 milliGRAM(s) Oral daily  QUEtiapine 25 milliGRAM(s) Oral at bedtime  senna 2 Tablet(s) Oral at bedtime  tacrolimus 2 milliGRAM(s) Oral <User Schedule>  tamsulosin 0.4 milliGRAM(s) Oral at bedtime  trimethoprim   80 mG/sulfamethoxazole 400 mG 1 Tablet(s) Oral daily  valGANciclovir 450 milliGRAM(s) Oral daily    MEDICATIONS  (PRN):  glucagon  Injectable 1 milliGRAM(s) IntraMuscular once PRN Glucose <70 milliGRAM(s)/deciLiter    PMHX/PSHX:  GIB (gastrointestinal bleeding)  GERD with esophagitis  Hepatic encephalopathy  Obesity  Fatty liver disease, nonalcoholic  Renal stones  Hypertension  Neuropathy  Hypercholesteremia  Diabetes  S/P cholecystectomy    Family history:  Family history of type 2 diabetes mellitus  Family history of hypertension  Family history of stomach cancer  No pertinent family history in first degree relatives    MARQUITA: As per HPI, 14-point ROS negative otherwise.    PHYSICAL EXAM:     Vital Signs:  Vital Signs Last 24 Hrs  T(C): 36.9 (2020 11:00), Max: 37 (10 Jul 2020 23:00)  T(F): 98.4 (2020 11:00), Max: 98.6 (10 Jul 2020 23:00)  HR: 84 (:00) (70 - 84)  BP: 108/65 (:00) (92/51 - 142/67)  BP(mean): 82 (:00) (67 - 97)  RR: 21 (:) (0 - 28)  SpO2: 100% (:) (82% - 100%)  Daily Weight in k.4 (2020 05:56)    GENERAL: no acute distress  NEURO: alert, AAOx1, +tremors, answers questions and follows commands  HEENT: anicteric sclera, no conjunctival pallor appreciated  CHEST: no respiratory distress, no accessory muscle use  CARDIAC: regular rate, rhythm  ABDOMEN: soft, incisions intact, ANDREINA drains in place w/ serosanguinous drainage  EXTREMITIES: warm, well perfused, trace/1+ BLLE edema  SKIN: R heel with small open wound with overlying dressing, no purulence    LABS:                        9.7    5.58  )-----------( 73       ( 10 Jul 2020 22:30 )             29.5     07-10    134<L>  |  101  |  27<H>  ----------------------------<  258<H>  4.5   |  27  |  1.06    Ca    8.1<L>      10 Jul 2020 22:30  Phos  2.8     07-10  Mg     1.8     07-10    TPro  4.5<L>  /  Alb  2.6<L>  /  TBili  1.3<H>  /  DBili  0.6<H>  /  AST  25  /  ALT  75<H>  /  AlkPhos  116  07-10    LIVER FUNCTIONS - ( 10 Jul 2020 22:30 )  Alb: 2.6 g/dL / Pro: 4.5 g/dL / ALK PHOS: 116 U/L / ALT: 75 U/L / AST: 25 U/L / GGT: x           PT/INR - ( 10 Jul 2020 22:30 )   PT: 13.0 sec;   INR: 1.14 ratio         PTT - ( 10 Jul 2020 22:30 )  PTT:28.3 sec  Urinalysis Basic - ( 10 Jul 2020 03:23 )    Color: Light Yellow / Appearance: Clear / S.011 / pH: x  Gluc: x / Ketone: Negative  / Bili: Negative / Urobili: Negative   Blood: x / Protein: Negative / Nitrite: Negative   Leuk Esterase: Negative / RBC: 2 /hpf / WBC 1 /HPF   Sq Epi: x / Non Sq Epi: 0 /hpf / Bacteria: 0.0      Amylase Serum--      Lipase serum--       [id number]    Imaging:    No new imaging

## 2020-07-11 NOTE — PROGRESS NOTE ADULT - ASSESSMENT
64 M hx of DM, HLD, GERD, HFpEF with mild LV diastolic dysfunction, and decompensated JEAN cirrhosis c/b ascites with hx of SBP, HE, duodenal ulcer, GAVE and duodenal AVM s/p APC (last on 10/11/19), UNOS listed for liver transplantation at [facility]. 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 He is now re-admitted with hepatic encephalopathy and VRE UTI, also with MISA on CKD. S/p liver transplant 7/2.    Impression:  #S/p liver transplant 7/2: liver enzymes continue to downtrend  OR details:  - L groin cutdown for vv bypass   - anastomosis: bicaval end-end/CBD duct-duct/HA & PV end-end, all standard anatomy.    - IOC with good flow  - Simulect induction. 500mg Solumedrol  - JPs x3 with T-Tube  - 14U pRBC, 14U FFP, 10 PLT, 11 CRYO, 500mL returned from cell saver, EBL 5L, UOP 1100mL  Recipient Info:   ABO: A  CMV:  Neg  EBV Positive  Donor:  Donor ID:  [id number] Match: [id number]  Age: 29 ABO:  A1 High Risk:   No COD: Cardiovascular  Anti CMV positive, EBV IgG- positive  HepBcAb-neg, Hepatitis C-DANA- neg, Hepatitis C ab-neg  #Tremors: Ongoign symptoms c/f taco toxicity +/- prednisone and opioid effects. May need medication change based on clinical course  #Halluciniations/agitation: resolved hallucinations but still agitated c/f prednisone toxicity.  #GI bleed - Hgb stable. No overt bleeding. 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 GAVE and PHG should have improved w/ liver transplant, most likely etiology is NG tube trauma prior if any c/f GI bleed  # Abnormal Chest X-Ray c/f evolving pna vs atelectasis: now on meropenem  # Hypertension on nifedipine now. BP improved  # Lower ext edema: diuresing, improving on lasix  #R posterior heel wound w/ overlying eschar – on meropenem for 1 more day. Local wound care. no signs of infections, XR neg for gas, evaluated by podiatry and ID. MRI w/o contrast limited, but no overt signs of active infection.  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  # Hyperglycemia with episodes of overcorrection    Recommendation:  - remove drain/lines as able (per surgery/SICU)  - meropenem per transplant ID; repeat culture and sputum culture  - f/u MRSA swab  - continue with standing magnesium  - diuresis with Lasix  - monitor renal function  - c/w methylpred taper with monitoring mental status  - CellCept on hold  - consider switch to CsA given ongoing neurotoxicity from tacro  - glycemic control with insulin, monitor for hypoglycemia  - c/w nystatin, Bactrim, valcyte ppx  - continue with seroquel and psychosis management per primary team  - trend Hb- PO PPI BID, monitor bowel movements  - aspirin per surgery  - advance diet, low Na diet  - wound care/nursing for R heel ulcer  - continue with 1:1 for agitation precaution  - f/u transplant surgery recs  - rest of care per SICU team  - transplant hepatology will follow    Scooter Abdi MD  Gastroenterology Fellow  Please contact via [employer] Teams    Please call [phone] to speak to answering service for on-call GI fellow. Can also e-mail [email or web address]. 64 M hx of DM, HLD, GERD, HFpEF with mild LV diastolic dysfunction, and decompensated JEAN cirrhosis c/b ascites with hx of SBP, HE, duodenal ulcer, GAVE and duodenal AVM s/p APC (last on 10/11/19), UNOS listed for liver transplantation at [facility]. 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 He is now re-admitted with hepatic encephalopathy and VRE UTI, also with IMSA on CKD. S/p liver transplant 7/2.    Impression:  #S/p liver transplant 7/2: liver enzymes continue to downtrend  OR details:  - L groin cutdown for vv bypass   - anastomosis: bicaval end-end/CBD duct-duct/HA & PV end-end, all standard anatomy.    - IOC with good flow  - Simulect induction. 500mg Solumedrol  - JPs x3 with T-Tube  - 14U pRBC, 14U FFP, 10 PLT, 11 CRYO, 500mL returned from cell saver, EBL 5L, UOP 1100mL  Recipient Info:   ABO: A  CMV:  Neg  EBV Positive  Donor:  Donor ID:  [id number] Match: [id number]  Age: 29 ABO:  A1 High Risk:   No COD: Cardiovascular  Anti CMV positive, EBV IgG- positive  HepBcAb-neg, Hepatitis C-DANA- neg, Hepatitis C ab-neg  #Tremors: Ongoign symptoms c/f taco toxicity +/- prednisone and opioid effects. May need medication change based on clinical course  #Halluciniations/agitation: resolved hallucinations but still agitated c/f prednisone toxicity.  #GI bleed - Hgb stable. No overt bleeding. 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 GAVE and PHG should have improved w/ liver transplant, most likely etiology is NG tube trauma prior if any c/f GI bleed  # Abnormal Chest X-Ray c/f evolving pna vs atelectasis: now on meropenem  # Hypertension on nifedipine now. BP improved  # Lower ext edema: diuresing, improving on lasix  #R posterior heel wound w/ overlying eschar – on meropenem for 1 more day. Local wound care. no signs of infections, XR neg for gas, evaluated by podiatry and ID. MRI w/o contrast limited, but no overt signs of active infection.  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  # Hyperglycemia with episodes of overcorrection    Recommendation:  - meropenem/linezolid per transplant ID  - continue with standing magnesium, will increase today  - diuresis with Lasix  - monitor renal function  - prednisone 5 mg po daily  - CellCept on hold  - consider switch to CsA given ongoing neurotoxicity from tacro  - glycemic control with insulin, monitor for hypoglycemia  - c/w nystatin, Bactrim, valcyte ppx  - continue with seroquel and psychosis management per primary team  - trend Hb- PO PPI BID, monitor bowel movements  - aspirin per surgery  - advance diet, low Na diet  - wound care/nursing for R heel ulcer  - continue with 1:1 for agitation precaution  - f/u transplant surgery recs  - rest of care per SICU team  - transplant hepatology will follow    Scooter Abdi MD  Gastroenterology Fellow  Please contact via [employer] Teams    Please call [phone] to speak to answering service for on-call GI fellow. Can also e-mail [email or web address].

## 2020-07-11 NOTE — PROGRESS NOTE ADULT - ATTENDING COMMENTS
continued meropenem  grew VRE on blood culture, unclear source, hx of urine VRE before.   MRSA neg  AM CXR no clear consolidation, prominence of rt peribronchial cuff  LFT within range  voiding freely  ANDREINA 450 cc output    - nifedipine held overnight, hold off for now  - NIV overnight  - repeat sputum culture attempt, pt is unable to cough effectively  - follow up urine culture  - monitor delirium, seroquel as needed  - VTE PPX held per transplant  - immunosuppresion per transplant  - repeat Bcx  - ID recommend CT chest ap, if transplant clears ok to do  - hyperglycemic x1, follow endo recommendations  - t tube clamps yesterday

## 2020-07-12 LAB
-  AMPICILLIN: SIGNIFICANT CHANGE UP
-  DAPTOMYCIN: SIGNIFICANT CHANGE UP
-  GENTAMICIN SYNERGY: SIGNIFICANT CHANGE UP
-  LINEZOLID: SIGNIFICANT CHANGE UP
-  STREPTOMYCIN SYNERGY: SIGNIFICANT CHANGE UP
-  VANCOMYCIN: SIGNIFICANT CHANGE UP
ALBUMIN SERPL ELPH-MCNC: 2.6 G/DL — LOW (ref 3.3–5)
ALP SERPL-CCNC: 212 U/L — HIGH (ref 40–120)
ALT FLD-CCNC: 98 U/L — HIGH (ref 10–45)
AMMONIA BLD-MCNC: 21 UMOL/L — SIGNIFICANT CHANGE UP (ref 11–55)
ANION GAP SERPL CALC-SCNC: 7 MMOL/L — SIGNIFICANT CHANGE UP (ref 5–17)
ANION GAP SERPL CALC-SCNC: 9 MMOL/L — SIGNIFICANT CHANGE UP (ref 5–17)
APTT BLD: 28.7 SEC — SIGNIFICANT CHANGE UP (ref 27.5–35.5)
AST SERPL-CCNC: 64 U/L — HIGH (ref 10–40)
BILIRUB DIRECT SERPL-MCNC: 0.5 MG/DL — HIGH (ref 0–0.2)
BILIRUB INDIRECT FLD-MCNC: 0.6 MG/DL — SIGNIFICANT CHANGE UP (ref 0.2–1)
BILIRUB SERPL-MCNC: 1.1 MG/DL — SIGNIFICANT CHANGE UP (ref 0.2–1.2)
BUN SERPL-MCNC: 26 MG/DL — HIGH (ref 7–23)
BUN SERPL-MCNC: 31 MG/DL — HIGH (ref 7–23)
CALCIUM SERPL-MCNC: 8.1 MG/DL — LOW (ref 8.4–10.5)
CALCIUM SERPL-MCNC: 8.3 MG/DL — LOW (ref 8.4–10.5)
CHLORIDE SERPL-SCNC: 95 MMOL/L — LOW (ref 96–108)
CHLORIDE SERPL-SCNC: 99 MMOL/L — SIGNIFICANT CHANGE UP (ref 96–108)
CO2 SERPL-SCNC: 27 MMOL/L — SIGNIFICANT CHANGE UP (ref 22–31)
CO2 SERPL-SCNC: 29 MMOL/L — SIGNIFICANT CHANGE UP (ref 22–31)
CREAT SERPL-MCNC: 1.24 MG/DL — SIGNIFICANT CHANGE UP (ref 0.5–1.3)
CREAT SERPL-MCNC: 1.26 MG/DL — SIGNIFICANT CHANGE UP (ref 0.5–1.3)
CULTURE RESULTS: SIGNIFICANT CHANGE UP
GLUCOSE BLDC GLUCOMTR-MCNC: 198 MG/DL — HIGH (ref 70–99)
GLUCOSE BLDC GLUCOMTR-MCNC: 214 MG/DL — HIGH (ref 70–99)
GLUCOSE BLDC GLUCOMTR-MCNC: 219 MG/DL — HIGH (ref 70–99)
GLUCOSE BLDC GLUCOMTR-MCNC: 239 MG/DL — HIGH (ref 70–99)
GLUCOSE SERPL-MCNC: 207 MG/DL — HIGH (ref 70–99)
GLUCOSE SERPL-MCNC: 217 MG/DL — HIGH (ref 70–99)
HCT VFR BLD CALC: 28.6 % — LOW (ref 39–50)
HGB BLD-MCNC: 9.3 G/DL — LOW (ref 13–17)
INR BLD: 1.12 RATIO — SIGNIFICANT CHANGE UP (ref 0.88–1.16)
MAGNESIUM SERPL-MCNC: 1.8 MG/DL — SIGNIFICANT CHANGE UP (ref 1.6–2.6)
MAGNESIUM SERPL-MCNC: 2 MG/DL — SIGNIFICANT CHANGE UP (ref 1.6–2.6)
MCHC RBC-ENTMCNC: 31.5 PG — SIGNIFICANT CHANGE UP (ref 27–34)
MCHC RBC-ENTMCNC: 32.5 GM/DL — SIGNIFICANT CHANGE UP (ref 32–36)
MCV RBC AUTO: 96.9 FL — SIGNIFICANT CHANGE UP (ref 80–100)
METHOD TYPE: SIGNIFICANT CHANGE UP
NRBC # BLD: 0 /100 WBCS — SIGNIFICANT CHANGE UP (ref 0–0)
ORGANISM # SPEC MICROSCOPIC CNT: SIGNIFICANT CHANGE UP
PHOSPHATE SERPL-MCNC: 1.9 MG/DL — LOW (ref 2.5–4.5)
PHOSPHATE SERPL-MCNC: 4 MG/DL — SIGNIFICANT CHANGE UP (ref 2.5–4.5)
PLATELET # BLD AUTO: 98 K/UL — LOW (ref 150–400)
POTASSIUM SERPL-MCNC: 4.4 MMOL/L — SIGNIFICANT CHANGE UP (ref 3.5–5.3)
POTASSIUM SERPL-MCNC: 5 MMOL/L — SIGNIFICANT CHANGE UP (ref 3.5–5.3)
POTASSIUM SERPL-SCNC: 4.4 MMOL/L — SIGNIFICANT CHANGE UP (ref 3.5–5.3)
POTASSIUM SERPL-SCNC: 5 MMOL/L — SIGNIFICANT CHANGE UP (ref 3.5–5.3)
PROT SERPL-MCNC: 4.4 G/DL — LOW (ref 6–8.3)
PROTHROM AB SERPL-ACNC: 12.7 SEC — SIGNIFICANT CHANGE UP (ref 10.6–13.6)
RBC # BLD: 2.95 M/UL — LOW (ref 4.2–5.8)
RBC # FLD: 18.4 % — HIGH (ref 10.3–14.5)
SODIUM SERPL-SCNC: 131 MMOL/L — LOW (ref 135–145)
SODIUM SERPL-SCNC: 135 MMOL/L — SIGNIFICANT CHANGE UP (ref 135–145)
SPECIMEN SOURCE: SIGNIFICANT CHANGE UP
TACROLIMUS SERPL-MCNC: 6.3 NG/ML — SIGNIFICANT CHANGE UP
WBC # BLD: 5.51 K/UL — SIGNIFICANT CHANGE UP (ref 3.8–10.5)
WBC # FLD AUTO: 5.51 K/UL — SIGNIFICANT CHANGE UP (ref 3.8–10.5)

## 2020-07-12 PROCEDURE — 71045 X-RAY EXAM CHEST 1 VIEW: CPT | Mod: 26

## 2020-07-12 PROCEDURE — 99233 SBSQ HOSP IP/OBS HIGH 50: CPT | Mod: GC

## 2020-07-12 PROCEDURE — 99232 SBSQ HOSP IP/OBS MODERATE 35: CPT

## 2020-07-12 PROCEDURE — 93975 VASCULAR STUDY: CPT | Mod: 26

## 2020-07-12 RX ORDER — TACROLIMUS 5 MG/1
3 CAPSULE ORAL
Refills: 0 | Status: DISCONTINUED | OUTPATIENT
Start: 2020-07-12 | End: 2020-07-13

## 2020-07-12 RX ORDER — QUETIAPINE FUMARATE 200 MG/1
25 TABLET, FILM COATED ORAL ONCE
Refills: 0 | Status: COMPLETED | OUTPATIENT
Start: 2020-07-12 | End: 2020-07-12

## 2020-07-12 RX ORDER — INSULIN GLARGINE 100 [IU]/ML
5 INJECTION, SOLUTION SUBCUTANEOUS ONCE
Refills: 0 | Status: COMPLETED | OUTPATIENT
Start: 2020-07-12 | End: 2020-07-12

## 2020-07-12 RX ORDER — INSULIN GLARGINE 100 [IU]/ML
16 INJECTION, SOLUTION SUBCUTANEOUS AT BEDTIME
Refills: 0 | Status: DISCONTINUED | OUTPATIENT
Start: 2020-07-12 | End: 2020-07-14

## 2020-07-12 RX ORDER — INSULIN LISPRO 100/ML
8 VIAL (ML) SUBCUTANEOUS
Refills: 0 | Status: DISCONTINUED | OUTPATIENT
Start: 2020-07-12 | End: 2020-07-13

## 2020-07-12 RX ORDER — INSULIN LISPRO 100/ML
6 VIAL (ML) SUBCUTANEOUS
Refills: 0 | Status: DISCONTINUED | OUTPATIENT
Start: 2020-07-12 | End: 2020-07-12

## 2020-07-12 RX ORDER — INSULIN LISPRO 100/ML
10 VIAL (ML) SUBCUTANEOUS
Refills: 0 | Status: DISCONTINUED | OUTPATIENT
Start: 2020-07-12 | End: 2020-07-13

## 2020-07-12 RX ORDER — MAGNESIUM SULFATE 500 MG/ML
2 VIAL (ML) INJECTION ONCE
Refills: 0 | Status: COMPLETED | OUTPATIENT
Start: 2020-07-12 | End: 2020-07-12

## 2020-07-12 RX ORDER — TACROLIMUS 5 MG/1
2 CAPSULE ORAL
Refills: 0 | Status: DISCONTINUED | OUTPATIENT
Start: 2020-07-12 | End: 2020-07-13

## 2020-07-12 RX ORDER — TAMSULOSIN HYDROCHLORIDE 0.4 MG/1
0.8 CAPSULE ORAL AT BEDTIME
Refills: 0 | Status: DISCONTINUED | OUTPATIENT
Start: 2020-07-12 | End: 2020-08-11

## 2020-07-12 RX ORDER — FUROSEMIDE 40 MG
80 TABLET ORAL EVERY 8 HOURS
Refills: 0 | Status: COMPLETED | OUTPATIENT
Start: 2020-07-12 | End: 2020-07-12

## 2020-07-12 RX ADMIN — PANTOPRAZOLE SODIUM 40 MILLIGRAM(S): 20 TABLET, DELAYED RELEASE ORAL at 05:27

## 2020-07-12 RX ADMIN — Medication 80 MILLIGRAM(S): at 12:19

## 2020-07-12 RX ADMIN — Medication 250 MILLIMOLE(S): at 06:18

## 2020-07-12 RX ADMIN — Medication 1 TABLET(S): at 11:44

## 2020-07-12 RX ADMIN — Medication 100000 UNIT(S): at 17:10

## 2020-07-12 RX ADMIN — NYSTATIN CREAM 1 APPLICATION(S): 100000 CREAM TOPICAL at 05:27

## 2020-07-12 RX ADMIN — Medication 250 MILLIMOLE(S): at 07:32

## 2020-07-12 RX ADMIN — INSULIN GLARGINE 16 UNIT(S): 100 INJECTION, SOLUTION SUBCUTANEOUS at 21:41

## 2020-07-12 RX ADMIN — Medication 10 UNIT(S): at 17:11

## 2020-07-12 RX ADMIN — Medication 81 MILLIGRAM(S): at 11:44

## 2020-07-12 RX ADMIN — Medication 100000 UNIT(S): at 11:44

## 2020-07-12 RX ADMIN — Medication 6 UNIT(S): at 11:45

## 2020-07-12 RX ADMIN — VALGANCICLOVIR 450 MILLIGRAM(S): 450 TABLET, FILM COATED ORAL at 11:44

## 2020-07-12 RX ADMIN — Medication 50 GRAM(S): at 06:18

## 2020-07-12 RX ADMIN — INSULIN GLARGINE 5 UNIT(S): 100 INJECTION, SOLUTION SUBCUTANEOUS at 23:59

## 2020-07-12 RX ADMIN — TAMSULOSIN HYDROCHLORIDE 0.8 MILLIGRAM(S): 0.4 CAPSULE ORAL at 21:41

## 2020-07-12 RX ADMIN — CHLORHEXIDINE GLUCONATE 1 APPLICATION(S): 213 SOLUTION TOPICAL at 05:26

## 2020-07-12 RX ADMIN — Medication 80 MILLIGRAM(S): at 20:03

## 2020-07-12 RX ADMIN — Medication 1 APPLICATION(S): at 11:45

## 2020-07-12 RX ADMIN — Medication 6 UNIT(S): at 07:32

## 2020-07-12 RX ADMIN — PANTOPRAZOLE SODIUM 40 MILLIGRAM(S): 20 TABLET, DELAYED RELEASE ORAL at 17:10

## 2020-07-12 RX ADMIN — Medication 250 MILLIMOLE(S): at 08:29

## 2020-07-12 RX ADMIN — Medication 100000 UNIT(S): at 05:26

## 2020-07-12 RX ADMIN — NYSTATIN CREAM 1 APPLICATION(S): 100000 CREAM TOPICAL at 17:10

## 2020-07-12 RX ADMIN — TACROLIMUS 2 MILLIGRAM(S): 5 CAPSULE ORAL at 20:02

## 2020-07-12 NOTE — PROGRESS NOTE ADULT - ATTENDING COMMENTS
POD#10 s/p OLT, complicated by steroid-induced psychosis with hallucinations and delirium, tacrolimus neurotoxicity with tremors, and now VRE bacteremia  Started on linezolid. Mental status significantly improved  No longer having hallucinations. d/c seroquel due to slight elevation in lft's.   Cont current immunosuppression with Prograf 2 mg qam, 3mg qpm, Prednisone 5 mg daily, Cellcept held.   Will check tacrolimus level and adjust dose accordingly.  Transplant Prophylaxis with nystatin, bactrim, valcyte  PPI due to steroids  Abx as per ID for pneumonia and VRE bacteremia.   cont lasix bid for LE edema

## 2020-07-12 NOTE — PROGRESS NOTE ADULT - SUBJECTIVE AND OBJECTIVE BOX
INTERVAL HPI/OVERNIGHT EVENTS:  more alert  MEDICATIONS  (STANDING):  aspirin enteric coated 81 milliGRAM(s) Oral daily  chlorhexidine 2% Cloths 1 Application(s) Topical <User Schedule>  collagenase Ointment 1 Application(s) Topical daily  furosemide    Tablet 80 milliGRAM(s) Oral every 8 hours  insulin glargine Injectable (LANTUS) 16 Unit(s) SubCutaneous at bedtime  insulin lispro (HumaLOG) corrective regimen sliding scale   SubCutaneous three times a day before meals  insulin lispro (HumaLOG) corrective regimen sliding scale   SubCutaneous at bedtime  insulin lispro Injectable (HumaLOG) 10 Unit(s) SubCutaneous before dinner  insulin lispro Injectable (HumaLOG) 8 Unit(s) SubCutaneous before breakfast  insulin lispro Injectable (HumaLOG) 8 Unit(s) SubCutaneous before lunch  linezolid  IVPB 600 milliGRAM(s) IV Intermittent every 12 hours  magnesium oxide 800 milliGRAM(s) Oral two times a day with meals  meropenem  IVPB 1000 milliGRAM(s) IV Intermittent every 8 hours  nystatin    Suspension 761747 Unit(s) Swish and Swallow four times a day  nystatin Cream 1 Application(s) Topical two times a day  pantoprazole  Injectable 40 milliGRAM(s) IV Push every 12 hours  polyethylene glycol 3350 17 Gram(s) Oral daily  predniSONE   Tablet 5 milliGRAM(s) Oral daily  senna 2 Tablet(s) Oral at bedtime  tacrolimus 3 milliGRAM(s) Oral <User Schedule>  tacrolimus 2 milliGRAM(s) Oral <User Schedule>  tamsulosin 0.8 milliGRAM(s) Oral at bedtime  trimethoprim   80 mG/sulfamethoxazole 400 mG 1 Tablet(s) Oral daily  valGANciclovir 450 milliGRAM(s) Oral daily    MEDICATIONS  (PRN):  glucagon  Injectable 1 milliGRAM(s) IntraMuscular once PRN Glucose <70 milliGRAM(s)/deciLiter      Allergies    codeine (Anaphylaxis)    Intolerances        Review of Systems:    General:  No wt loss, fevers, chills, night sweats,fatigue,   Eyes:  Good vision, no reported pain  ENT:  No sore throat, pain, runny nose, dysphagia  CV:  No pain, palpitatioins, hypo/hypertension  Resp:  No dyspnea, cough, tachypnea, wheezing  GI:  No pain, No nausea, No vomiting, No diarrhea, No constipatiion, No weight loss, No fever, No pruritis, No rectal bleeding, No tarry stools, No dysphagia,  :  No pain, bleeding, incontinence, nocturia  Muscle:  No pain, weakness  Neuro:  No weakness, tingling, memory problems  Psych:  No fatigue, insomnia, mood problems, depression  Endocrine:  No polyuria, polydypsia, cold/heat intolerance  Heme:  No petechiae, ecchymosis, easy bruisability  Skin:  No rash, tattoos, scars, edema      Vital Signs Last 24 Hrs  T(C): 36.3 (12 Jul 2020 15:00), Max: 36.8 (11 Jul 2020 19:00)  T(F): 97.3 (12 Jul 2020 15:00), Max: 98.2 (11 Jul 2020 19:00)  HR: 80 (12 Jul 2020 15:00) (72 - 83)  BP: 109/59 (12 Jul 2020 15:00) (94/55 - 154/68)  BP(mean): 78 (12 Jul 2020 15:00) (70 - 125)  RR: 15 (12 Jul 2020 15:00) (9 - 28)  SpO2: 96% (12 Jul 2020 15:00) (96% - 100%)    PHYSICAL EXAM:    Constitutional: NAD, well-developed  HEENT: EOMI, throat clear  Neck: No LAD, supple  Respiratory: CTA and P  Cardiovascular: S1 and S2, RRR, no M  Gastrointestinal: BS+, soft, NT/ND, neg HSM,  Extremities: No peripheral edema, neg clubing, cyanosis  Vascular: 2+ peripheral pulses  Neurological: A/O x 3, no focal deficits  Psychiatric: Normal mood, normal affect  Skin: No rashes      LABS:                        9.3    5.51  )-----------( 98       ( 12 Jul 2020 01:54 )             28.6     07-12    131<L>  |  95<L>  |  31<H>  ----------------------------<  217<H>  5.0   |  27  |  1.26    Ca    8.1<L>      12 Jul 2020 12:22  Phos  4.0     07-12  Mg     2.0     07-12    TPro  4.4<L>  /  Alb  2.6<L>  /  TBili  1.1  /  DBili  0.5<H>  /  AST  64<H>  /  ALT  98<H>  /  AlkPhos  212<H>  07-12    PT/INR - ( 12 Jul 2020 01:54 )   PT: 12.7 sec;   INR: 1.12 ratio         PTT - ( 12 Jul 2020 01:54 )  PTT:28.7 sec      RADIOLOGY & ADDITIONAL TESTS:

## 2020-07-12 NOTE — PROGRESS NOTE ADULT - ATTENDING COMMENTS
63 yo M with HLD, IDDM, GERD, HFpEF with mild LV diastolic dysfunction, chronic right foot ulcer, recurrent UTIs, and decompensated JEAN cirrhosis, s/p OLT 7/2 (standard criteria donor, standard vascular anastomoses with duct-to-duct biliary anastomosis over T-tube, CMV D+/R-, EBV D+/R+, received Simulect induction). Post-transplant course has been complicated by steroid-induced psychosis with hallucinations and delirium, tacrolimus neurotoxicity with tremors, anasarca / ascites, RLL pneumonia, and VRE bacteremia.    # S/p OLT, POD #10: Hepatic allograft function stable but with elevated liver enzymes yesterday, slightly improved today. Patent veins on CT done yesterday but awaiting Doppler US today. May have mild ACR in setting of recently decreased immunosuppression, will adjust as below. May be transient related to infection. Alternatively, may be related to quetiapine hepatotoxicity -- will hold tonight unless agitation recurs. Continuing ASA for HAT prophylaxis.    # Immunosuppression: Continuing prednisone 5 mg po daily. MMF held given bacteremia, but may need to re-start if liver enzymes uptrend. Continuing tacrolimus, but given active infection will aim for trough level ~8 ng/mL instead of 10 ng/mL as per discussion with Transplant Surgery today. Increasing magnesium supplementation for goal Mg >2.    # RLL pneumonia and VRE bacteremia: Re-started on meropenem (7/9- ) and linezolid added (7/10- ), as per Transplant ID. RUL pulmonary opacity appears improved on most recent chest imaging. No intra-abdominal focus of infection seen on CT abdomen yesterday and vascular anastomoses appear patent. Awaiting Doppler US, as above. Will follow-up repeat cultures.    # Altered mental status: Delirium much improved today. Suspect was steroid-related psychosis as well as possible ICU vs sepsis-related delirium, now improving with antibiotics and decreased corticosteroids. Has not required Haldol for past 48h, though still was receiving quetiapine nightly. Will hold tonight unless agitation recurs, especially given concern for possible hepatotoxicity, as above.     # Tremulousness: Secondary to tacrolimus neurotoxicity, but improving, and no longer anticipate that he will require switch to cyclosporine.    # Prophylaxis: Continuing Bactrim, Valcyte, and nystatin prophylaxis.    # Anasarca / ascites: Continuing diuresis, with Lasix 80 mg po bid today. Renal function stable.    # Chronic right heel ulcer: Does not appear infected. Continuing wound care.    # SHENG: Appears to be having apneic episodes while sleeping. Continue Bipap nightly.    # Debility: Daily PT.    Plan of care discussed in detail in multidisciplinary rounds with Transplant Surgery, Transplant ID, and SICU teams. Please don't hesitate to call with any questions/concerns.    Letitia Mo M.D., Ph.D.  Transplant Hepatology  Cell: [phone]

## 2020-07-12 NOTE — PROGRESS NOTE ADULT - SUBJECTIVE AND OBJECTIVE BOX
Transplant Surgery Progress Note    Transplant Surgery - Multidisciplinary Rounds  --------------------------------------------------------------  OLTx      Date:  7/2/2020       POD#10    Present: Patient seen with multidisciplinary team including Transplant Surgeon: Dr. Garcia, hepatologist Dr. Mo, resident Nael Quezada and OLY Burciaga during am rounds and examined with Dr. Garcia. Disciplines not in attendance will be notified of the plan.     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     Re-admitted on 6/11:   ·	worsening HE  ·	UTI/VRE: tx with linezolid 6/15-22, bactrim DS 6/22 - 7/2  ·	MISA/Hyponatremia  ·	UGI bleed (melena) s/p EGD (6/22) c/w hemorrhagic duodenitis/gastritis; Grade 2 EV; requiring multiple transfusions  ·	Known h/o R foot ulcer (MRI neg for OM)  ·	s/p OLTx on 7/2/2020, post op liver doppler with patent vessels  ·	post op course c/b delirium     OR details:  - L groin cutdown for vv bypass   - anastomosis: bicaval end-end/CBD duct-duct/HA & PV end-end, all standard anatomy.    - IOC with good flow  - Simulect induction. 500mg Solumedrol  - JPs x3 with T-Tube  - 14U pRBC, 14U FFP, 10 PLT, 11 CRYO, 500mL returned from cell saver, EBL 5L, UOP 1100mL    Recipient Info: ABO: A  CMV:  Neg  EBV Positive    Donor:  Donor ID:  [id number] Match: [id number]  Age: 29 ABO:  A1 High Risk:   No COD: Cardiovascular  Anti CMV positive, EBV IgG- positive  HepBcAb-neg, Hepatitis C-DANA- neg, Hepatitis C ab-neg    Interval events:  - POD 10 s/p OLT with good graft function. INR stable at 1.2 T bili down to 1.1. AST/ALT down as compared to labs from yesterday; however slightly up when compared to 7/10.  -AOX3 this AM, conversive, less agitated, following commands. On Seroquel.   -Still w/ tremors, but significantly decreased as compared to yesterday.  -7/10 bld cx: VRE, on mari and linezolid. MMF still held.  -H/H remains stable 9.3/28.6.   -Tac level yesterday7.4, started back on tac 2/2.    -UOP 3.1L. Drain output 800cc over 24 hrs SS.   -Chest/abd/pelvis CT done w/ contrast yesterday showed small b/l pleural effusions (R>L), small volume ascites. Partially seen fluid collection in L groin. Given 500 cc NS bolus since was given IV contrast     MEDICATIONS  (STANDING):  aspirin enteric coated 81 milliGRAM(s) Oral daily  chlorhexidine 2% Cloths 1 Application(s) Topical <User Schedule>  collagenase Ointment 1 Application(s) Topical daily  furosemide    Tablet 80 milliGRAM(s) Oral every 8 hours  insulin glargine Injectable (LANTUS) 14 Unit(s) SubCutaneous at bedtime  insulin lispro (HumaLOG) corrective regimen sliding scale   SubCutaneous three times a day before meals  insulin lispro (HumaLOG) corrective regimen sliding scale   SubCutaneous at bedtime  insulin lispro Injectable (HumaLOG) 6 Unit(s) SubCutaneous before breakfast  insulin lispro Injectable (HumaLOG) 6 Unit(s) SubCutaneous before lunch  insulin lispro Injectable (HumaLOG) 10 Unit(s) SubCutaneous before dinner  linezolid  IVPB 600 milliGRAM(s) IV Intermittent every 12 hours  magnesium oxide 800 milliGRAM(s) Oral two times a day with meals  meropenem  IVPB 1000 milliGRAM(s) IV Intermittent every 8 hours  nystatin    Suspension 257697 Unit(s) Swish and Swallow four times a day  nystatin Cream 1 Application(s) Topical two times a day  pantoprazole  Injectable 40 milliGRAM(s) IV Push every 12 hours  polyethylene glycol 3350 17 Gram(s) Oral daily  predniSONE   Tablet 5 milliGRAM(s) Oral daily  senna 2 Tablet(s) Oral at bedtime  tacrolimus 2 milliGRAM(s) Oral <User Schedule>  tamsulosin 0.8 milliGRAM(s) Oral at bedtime  trimethoprim   80 mG/sulfamethoxazole 400 mG 1 Tablet(s) Oral daily  valGANciclovir 450 milliGRAM(s) Oral daily    MEDICATIONS  (PRN):  glucagon  Injectable 1 milliGRAM(s) IntraMuscular once PRN Glucose <70 milliGRAM(s)/deciLiter      PAST MEDICAL & SURGICAL HISTORY:  GIB (gastrointestinal bleeding)  GERD with esophagitis: Gastritis &amp; Non Bleeding Ulcers  Hepatic encephalopathy  Obesity  Fatty liver disease, nonalcoholic  Renal stones: 25 years ago  Hypertension  Neuropathy  Hypercholesteremia  Diabetes  S/P cholecystectomy      Vital Signs Last 24 Hrs  T(C): 36.3 (12 Jul 2020 07:00), Max: 36.8 (11 Jul 2020 19:00)  T(F): 97.3 (12 Jul 2020 07:00), Max: 98.2 (11 Jul 2020 19:00)  HR: 81 (12 Jul 2020 09:00) (72 - 84)  BP: 109/57 (12 Jul 2020 09:00) (94/55 - 151/67)  BP(mean): 75 (12 Jul 2020 09:00) (70 - 101)  RR: 19 (12 Jul 2020 09:00) (0 - 22)  SpO2: 100% (12 Jul 2020 09:00) (82% - 100%)    I&O's Summary    11 Jul 2020 07:01  -  12 Jul 2020 07:00  --------------------------------------------------------  IN: 2700 mL / OUT: 3950 mL / NET: -1250 mL    12 Jul 2020 07:01  -  12 Jul 2020 11:12  --------------------------------------------------------  IN: 490 mL / OUT: 0 mL / NET: 490 mL                              9.3    5.51  )-----------( 98       ( 12 Jul 2020 01:54 )             28.6     07-12    135  |  99  |  26<H>  ----------------------------<  207<H>  4.4   |  29  |  1.24    Ca    8.3<L>      12 Jul 2020 01:54  Phos  1.9     07-12  Mg     1.8     07-12    TPro  4.4<L>  /  Alb  2.6<L>  /  TBili  1.1  /  DBili  0.5<H>  /  AST  64<H>  /  ALT  98<H>  /  AlkPhos  212<H>  07-12    Tacrolimus (), Serum: 6.3 ng/mL (07-12 @ 07:42)        Culture - Urine (collected 07-11-20 @ 03:14)  Source: .Urine Catheterized  Final Report (07-11-20 @ 23:38):    No growth    Culture - Blood (collected 07-10-20 @ 00:22)  Source: .Blood Blood-Peripheral  Preliminary Report (07-11-20 @ 01:03):    No growth to date.    Culture - Blood (collected 07-10-20 @ 00:22)  Source: .Blood Blood-Peripheral  Gram Stain (07-10-20 @ 17:53):    Growth in aerobic bottle: Gram positive cocci in pairs  Preliminary Report (07-11-20 @ 13:54):    Growth in aerobic bottle: Enterococcus faecium    "Due to technical problems, Proteus sp. will Not be reported as part of    the BCID panel until further notice"    ***Blood Panel PCR results on this specimen are available    approximately 3 hours after the Gram stain result.***    Gram stain, PCR, and/or culture results may not always    correspond due to difference in methodologies.    ************************************************************    This PCR assay was performed using [employer].    The following targets are tested for: Enterococcus,    vancomycin resistant enterococci, Listeria monocytogenes,    coagulase negative staphylococci, S. aureus,    methicillin resistant S. aureus, Streptococcus agalactiae    (Group B), S. pneumoniae, S. pyogenes (Group A),    Acinetobacter baumannii, Enterobacter cloacae, E. coli,    Klebsiella oxytoca, K. pneumoniae, Proteus sp.,    Serratia marcescens, Haemophilus influenzae,    Neisseria meningitidis, Pseudomonas aeruginosa, Candida    albicans, C. glabrata, C krusei, C parapsilosis,    C. tropicalis and the KPC resistance gene.  Organism: Blood Culture PCR (07-10-20 @ 20:21)  Organism: Blood Culture PCR (07-10-20 @ 20:21)      Review of systems  Gen: No weight changes, fatigue, fevers/chills, weakness  Skin: No rashes  Head/Eyes/Ears/Mouth: No headache; Normal hearing; Normal vision w/o blurriness; No sinus pain/discomfort, sore throat  Respiratory: No dyspnea, cough, wheezing, hemoptysis  CV: No chest pain, PND, orthopnea  GI: Mild abdominal pain at surgical incision site; denies diarrhea, constipation, nausea, vomiting, melena, hematochezia  : No increased frequency, dysuria, hematuria, nocturia  MSK: No joint pain/swelling; no back pain; no edema  Neuro: No dizziness/lightheadedness, weakness, seizures, numbness, tingling  Psych: No significant nervousness, anxiety, stress, depression  All other systems were reviewed and are negative, except as noted.      Physical exam:  Constitutional: restless  Eyes: mild icterus  ENMT: NC/AT  Neck: supple  Respiratory: CTA b/l  Cardiovascular: RRR  Gastrointestinal: Soft abdomen, ND, mild incisional tenderness, incision intact with staples, T-tube clamped  ANDREINA SS output  Genitourinary: voiding spontaneously/condom cath  Extremities: SCD's in place and working bilaterally. L groin cutdown site c/d/i  Vascular: Palpable dp pulses bilaterally  Neurological: AO x 3; upper body tremors still present, much improved since yesterday  Skin: no rashes, ulcerations, lesions post-op  Psychiatric: responsive

## 2020-07-12 NOTE — PROGRESS NOTE ADULT - SUBJECTIVE AND OBJECTIVE BOX
Chief Complaint: Confusion  Reason for consult: PSE, s/p OLT    Interval Events  - Mental status continues to improve  - Liver enzymes increasing    Allergies:  codeine (Anaphylaxis)    MEDICATIONS  (STANDING):  aspirin enteric coated 81 milliGRAM(s) Oral daily  chlorhexidine 2% Cloths 1 Application(s) Topical <User Schedule>  collagenase Ointment 1 Application(s) Topical daily  furosemide    Tablet 80 milliGRAM(s) Oral every 8 hours  insulin glargine Injectable (LANTUS) 14 Unit(s) SubCutaneous at bedtime  insulin lispro (HumaLOG) corrective regimen sliding scale   SubCutaneous three times a day before meals  insulin lispro (HumaLOG) corrective regimen sliding scale   SubCutaneous at bedtime  insulin lispro Injectable (HumaLOG) 6 Unit(s) SubCutaneous before breakfast  insulin lispro Injectable (HumaLOG) 6 Unit(s) SubCutaneous before lunch  insulin lispro Injectable (HumaLOG) 10 Unit(s) SubCutaneous before dinner  linezolid  IVPB 600 milliGRAM(s) IV Intermittent every 12 hours  magnesium oxide 800 milliGRAM(s) Oral two times a day with meals  meropenem  IVPB 1000 milliGRAM(s) IV Intermittent every 8 hours  nystatin    Suspension 594289 Unit(s) Swish and Swallow four times a day  nystatin Cream 1 Application(s) Topical two times a day  pantoprazole  Injectable 40 milliGRAM(s) IV Push every 12 hours  polyethylene glycol 3350 17 Gram(s) Oral daily  predniSONE   Tablet 5 milliGRAM(s) Oral daily  senna 2 Tablet(s) Oral at bedtime  tacrolimus 2 milliGRAM(s) Oral <User Schedule>  tamsulosin 0.8 milliGRAM(s) Oral at bedtime  trimethoprim   80 mG/sulfamethoxazole 400 mG 1 Tablet(s) Oral daily  valGANciclovir 450 milliGRAM(s) Oral daily    MEDICATIONS  (PRN):  glucagon  Injectable 1 milliGRAM(s) IntraMuscular once PRN Glucose <70 milliGRAM(s)/deciLiter    PMHX/PSHX:  GIB (gastrointestinal bleeding)  GERD with esophagitis  Hepatic encephalopathy  Obesity  Fatty liver disease, nonalcoholic  Renal stones  Hypertension  Neuropathy  Hypercholesteremia  Diabetes  S/P cholecystectomy    Family history:  Family history of type 2 diabetes mellitus  Family history of hypertension  Family history of stomach cancer  No pertinent family history in first degree relatives    ROS: As per HPI, 14-point ROS negative otherwise.    PHYSICAL EXAM:     Vital Signs:  Vital Signs Last 24 Hrs  T(C): 36.1 (2020 11:00), Max: 36.8 (2020 19:00)  T(F): 97 (2020 11:00), Max: 98.2 (2020 19:00)  HR: 80 (2020 14:00) (72 - 83)  BP: 149/112 (2020 14:00) (94/55 - 154/68)  BP(mean): 125 (2020 14:00) (70 - 125)  RR: 12 (2020 14:00) (9 - 28)  SpO2: 100% (2020 14:00) (96% - 100%)  Daily Weight in k.6 (2020 02:00)    GENERAL: no acute distress  NEURO: alert, AAOx2, +tremors, answers questions and follows commands  HEENT: anicteric sclera, no conjunctival pallor appreciated  CHEST: no respiratory distress, no accessory muscle use  CARDIAC: regular rate, rhythm  ABDOMEN: soft, incisions intact, ANDREINA drains in place w/ serosanguinous drainage  EXTREMITIES: warm, well perfused, trace/1+ BLLE edema  SKIN: R heel with small open wound with overlying dressing, no purulence    LABS:                        9.3    5.51  )-----------( 98       ( 2020 01:54 )             28.6     07-12    131<L>  |  95<L>  |  31<H>  ----------------------------<  217<H>  5.0   |  27  |  1.26    Ca    8.1<L>      2020 12:22  Phos  4.0     07-12  Mg     2.0     07-12    TPro  4.4<L>  /  Alb  2.6<L>  /  TBili  1.1  /  DBili  0.5<H>  /  AST  64<H>  /  ALT  98<H>  /  AlkPhos  212<H>  07-12    LIVER FUNCTIONS - ( 2020 01:54 )  Alb: 2.6 g/dL / Pro: 4.4 g/dL / ALK PHOS: 212 U/L / ALT: 98 U/L / AST: 64 U/L / GGT: x           PT/INR - ( 2020 01:54 )   PT: 12.7 sec;   INR: 1.12 ratio         PTT - ( 2020 01:54 )  PTT:28.7 sec    Amylase Serum--      Lipase serum--       [id number]  Amylase Serum--      Lipase serum--       [id number]    Imaging:    No new imaging

## 2020-07-12 NOTE — PROGRESS NOTE ADULT - ASSESSMENT
64 M hx of DM, HLD, GERD, HFpEF with mild LV diastolic dysfunction, and decompensated JEAN cirrhosis c/b ascites with hx of SBP, HE, duodenal ulcer, GAVE and duodenal AVM s/p APC (last on 10/11/19), [facility] listed for liver transplantation at [facility]. 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 He is now re-admitted with hepatic encephalopathy and VRE UTI, also with MISA on CKD. S/p liver transplant 7/2.    Impression:  #S/p liver transplant 7/2: liver enzymes continue to downtrend  OR details:  - L groin cutdown for vv bypass   - anastomosis: bicaval end-end/CBD duct-duct/HA & PV end-end, all standard anatomy.    - IOC with good flow  - Simulect induction. 500mg Solumedrol  - JPs x3 with T-Tube  - 14U pRBC, 14U FFP, 10 PLT, 11 CRYO, 500mL returned from cell saver, EBL 5L, UOP 1100mL  Recipient Info:   ABO: A  CMV:  Neg  EBV Positive  Donor:  Donor ID:  [id number] Match: [id number]  Age: 29 ABO:  A1 High Risk:   No COD: Cardiovascular  Anti CMV positive, EBV IgG- positive  HepBcAb-neg, Hepatitis C-DANA- neg, Hepatitis C ab-neg  #Tremors: Ongoign symptoms c/f taco toxicity +/- prednisone and opioid effects. May need medication change based on clinical course  #Halluciniations/agitation: resolved hallucinations but still agitated c/f prednisone toxicity.  #GI bleed - Hgb stable. No overt bleeding. 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 GAVE and PHG should have improved w/ liver transplant, most likely etiology is NG tube trauma prior if any c/f GI bleed  # Abnormal Chest X-Ray c/f evolving pna vs atelectasis: now on meropenem  # Hypertension on nifedipine now. BP improved  # Lower ext edema: diuresing, improving on lasix  #R posterior heel wound w/ overlying eschar – on meropenem for 1 more day. Local wound care. no signs of infections, XR neg for gas, evaluated by podiatry and ID. MRI w/o contrast limited, but no overt signs of active infection.  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  # Hyperglycemia with episodes of overcorrection    Recommendation:  - doppler u/s to evaluate vasculature  - meropenem/linezolid per transplant ID  - continue with standing magnesium, will increase today  - diuresis with Lasix 80 mg IV BID  - monitor renal function  - prednisone 5 mg po daily  - CellCept on hold  - consider switch to CsA given ongoing neurotoxicity from tacro  - glycemic control with insulin, monitor for hypoglycemia  - c/w nystatin, Bactrim, valcyte ppx  - hold seroquel tonight, psychosis management per primary team  - trend Hb- PO PPI BID, monitor bowel movements  - aspirin per surgery  - advance diet, low Na diet  - wound care/nursing for R heel ulcer  - continue with 1:1 for agitation precaution  - f/u transplant surgery recs  - rest of care per SICU team  - transplant hepatology will follow    Scooter Abdi MD  Gastroenterology Fellow  Please contact via [employer] Teams    Please call [phone] to speak to answering service for on-call GI fellow. Can also e-mail [email or web address].

## 2020-07-12 NOTE — PROGRESS NOTE ADULT - SUBJECTIVE AND OBJECTIVE BOX
[facility] SICU Progress Note    24 HOUR EVENTS:  - mental status improving   - bcx 7/10 with GPC in pairs,overnight bcx speciated, growing VRE. Added linezolid per ID    - Per endo: 14 units lantus, 7 humalog  - nefedipine d/c   - started tacro BID   - f/u CT C/A/P- groin hematoma, small trace pleural effusion--awaiting final read  - LFTs uptrending at 9pm (122/116<--25/75)), now going back down as of 2am (64/96)  - straight cath overnight, bladder scan w/ 700cc      SUBJECTIVE/ROS:   [X]  Due to altered mental status/intubation, subjective information were not able to be obtained from the patient. History was obtained, to the extent possible, from review of the chart and collateral sources of information.  [] A 10 point review of systems was negative except as noted above      ALLERGIES:  codeine (Anaphylaxis)      VITAL SIGNS:  ICU Vital Signs Last 24 Hrs  T(C): 36.7 (12 Jul 2020 03:00), Max: 36.9 (11 Jul 2020 11:00)  T(F): 98.1 (12 Jul 2020 03:00), Max: 98.4 (11 Jul 2020 11:00)  HR: 75 (12 Jul 2020 05:00) (74 - 84)  BP: 115/57 (12 Jul 2020 05:00) (92/51 - 151/67)  BP(mean): 79 (12 Jul 2020 05:00) (67 - 101)  ABP: --  ABP(mean): --  RR: 17 (12 Jul 2020 05:00) (0 - 25)  SpO2: 100% (12 Jul 2020 05:00) (82% - 100%)      NEUROLOGY:  Exam: A&Ox1, delirium improving during the day but began to worsen overnight. Hallucinations present.   Pain Control:   QUEtiapine 25 milliGRAM(s) Oral at bedtime      [X] Adequacy of sedation and pain control has been assessed and adjusted    RESPIRATORY:  Exam: No additional work of breathing when awake. Apnea when asleep.    Mechanical Ventilation:   ABG - ( 10 Jul 2020 22:22 )  pH: 7.39  /  pCO2: 54    /  pO2: 141   / HCO3: 33    / Base Excess: 7.0   /  SaO2: 99      Lactate: x          CARDIOVASCULAR  Exam: RRR. No cyanosis/pallor. Edema in b/l LE.    tamsulosin 0.4 milliGRAM(s) Oral at bedtime      GI:  Exam: nontender, nondistended, soft. RUQ/epigastric incision c/d/i with dressing in place. T-tube in RUQ clamped. ANDREINA w/ SS output.    Diet:   pantoprazole  Injectable 40 milliGRAM(s) IV Push every 12 hours  polyethylene glycol 3350 17 Gram(s) Oral daily  senna 2 Tablet(s) Oral at bedtime      :  magnesium oxide 800 milliGRAM(s) Oral two times a day with meals    I&O's Detail    07-10 @ 07:01  -  07-11 @ 07:00  --------------------------------------------------------  IN:    Oral Fluid: 950 mL    Solution: 1050 mL  Total IN: 2000 mL    OUT:    Drain: 450 mL    Incontinent per Condom Catheter: 2300 mL    Intermittent Catheterization - Urethral: 650 mL  Total OUT: 3400 mL    Total NET: -1400 mL      07-11 @ 07:01  -  07-12 @ 06:00  --------------------------------------------------------  IN:    Oral Fluid: 1200 mL    Sodium Chloride 0.9% IV Bolus: 500 mL    Solution: 100 mL    Solution: 350 mL  Total IN: 2150 mL    OUT:    Drain: 730 mL    Incontinent per Condom Catheter: 2200 mL    Intermittent Catheterization - Urethral: 950 mL  Total OUT: 3880 mL    Total NET: -1730 mL          07-12    135  |  99  |  26<H>  ----------------------------<  207<H>  4.4   |  29  |  1.24    Ca    8.3<L>      12 Jul 2020 01:54  Phos  1.9     07-12  Mg     1.8     07-12    TPro  4.4<L>  /  Alb  2.6<L>  /  TBili  1.1  /  DBili  0.5<H>  /  AST  64<H>  /  ALT  98<H>  /  AlkPhos  212<H>  07-12    Beasley catheter indication:    HEMATOLOGIC:  aspirin enteric coated 81 milliGRAM(s) Oral daily    [--] DVT Prophylaxis: none                        9.3    5.51  )-----------( 98       ( 12 Jul 2020 01:54 )             28.6     PT/INR - ( 12 Jul 2020 01:54 )   PT: 12.7 sec;   INR: 1.12 ratio         PTT - ( 12 Jul 2020 01:54 )  PTT:28.7 sec  Transfusions:	[ ] PRBC	[ ] Platelets	[ ] FFP		[ ] Cryoprecipitate  Comments: no transfusions required at this time    INFECTIOUS DISEASE:  linezolid  IVPB 600 milliGRAM(s) IV Intermittent every 12 hours  meropenem  IVPB 1000 milliGRAM(s) IV Intermittent every 8 hours  nystatin    Suspension 055097 Unit(s) Swish and Swallow four times a day  tacrolimus 2 milliGRAM(s) Oral <User Schedule>  trimethoprim   80 mG/sulfamethoxazole 400 mG 1 Tablet(s) Oral daily  valGANciclovir 450 milliGRAM(s) Oral daily    RECENT CULTURES:  Specimen Source: .Urine Catheterized  Date/Time: 07-11 @ 03:14  Culture Results:   No growth  Gram Stain: --  Organism: --  Specimen Source: .Blood Blood-Peripheral  Date/Time: 07-10 @ 00:22  Culture Results:   Growth in aerobic bottle: Enterococcus faecium  "Due to technical problems, Proteus sp. will Not be reported as part of  the BCID panel until further notice"  ***Blood Panel PCR results on this specimen are available  approximately 3 hours after the Gram stain result.***  Gram stain, PCR, and/or culture results may not always  correspond due to difference in methodologies.  ************************************************************  This PCR assay was performed using [employer].  The following targets are tested for: Enterococcus,  vancomycin resistant enterococci, Listeria monocytogenes,  coagulase negative staphylococci, S. aureus,  methicillin resistant S. aureus, Streptococcus agalactiae  (Group B), S. pneumoniae, S. pyogenes (Group A),  Acinetobacter baumannii, Enterobacter cloacae, E. coli,  Klebsiella oxytoca, K. pneumoniae, Proteus sp.,  Serratia marcescens, Haemophilus influenzae,  Neisseria meningitidis, Pseudomonas aeruginosa, Candida  albicans, C. glabrata, C krusei, C parapsilosis,  C. tropicalis and the KPC resistance gene.  Gram Stain:   Growth in aerobic bottle: Gram positive cocci in pairs  Organism: Blood Culture PCR      ENDOCRINE:  glucagon  Injectable 1 milliGRAM(s) IntraMuscular once PRN  insulin glargine Injectable (LANTUS) 14 Unit(s) SubCutaneous at bedtime  insulin lispro (HumaLOG) corrective regimen sliding scale   SubCutaneous three times a day before meals  insulin lispro (HumaLOG) corrective regimen sliding scale   SubCutaneous at bedtime  insulin lispro Injectable (HumaLOG) 6 Unit(s) SubCutaneous three times a day before meals  predniSONE   Tablet 5 milliGRAM(s) Oral daily    CAPILLARY BLOOD GLUCOSE      POCT Blood Glucose.: 324 mg/dL (11 Jul 2020 21:13)  POCT Blood Glucose.: 281 mg/dL (11 Jul 2020 17:11)  POCT Blood Glucose.: 194 mg/dL (11 Jul 2020 12:20)  POCT Blood Glucose.: 177 mg/dL (11 Jul 2020 08:22)      PATIENT CARE ACCESS DEVICES:  [X] Peripheral IV  [ ] Central Venous Line	[ ] R	[ ] L	[ ] IJ	[ ] Fem	[ ] SC	Placed:   [ ] Arterial Line		[ ] R	[ ] L	[ ] Fem	[ ] Rad	[ ] Ax	Placed:   [ ] PICC:					[ ] Mediport  [ ] Urinary Catheter, Date Placed:   [X] Necessity of urinary, arterial, and venous catheters discussed    OTHER MEDICATIONS:  chlorhexidine 2% Cloths 1 Application(s) Topical <User Schedule>  collagenase Ointment 1 Application(s) Topical daily  nystatin Cream 1 Application(s) Topical two times a day

## 2020-07-12 NOTE — PROGRESS NOTE ADULT - PROBLEM SELECTOR PLAN 3
-defer statin in the setting of recent liver x-plant    discussed plan w/pt and team  pager: [phone]   cell: [phone]  office: [phone]    -I spent a total time of 26 mins with the patient at bedside/on unit of which more than 50% of time was spent on counseling/coordination of care.

## 2020-07-12 NOTE — PROGRESS NOTE ADULT - SUBJECTIVE AND OBJECTIVE BOX
Diabetes Follow up note:    Chief complaint: T2DM s/p liver xplant    Interval Hx: BG values 200-300s over past 24 hours. Pt seen at bedside. Calm and answering questions. Said he ate 2 trays at lunchtime today. Asking if he will be able to drink alcohol when he is recovered. Still has 1:1 at bedside.     Review of Systems:  General: denies pain  GI: Tolerating POs. Denies N/V/D/Abd pain  CV: Denies CP/SOB  ENDO: No S&Sx of hypoglycemia  MEDS:  insulin glargine Injectable (LANTUS) 16 Unit(s) SubCutaneous at bedtime  insulin lispro (HumaLOG) corrective regimen sliding scale   SubCutaneous three times a day before meals  insulin lispro (HumaLOG) corrective regimen sliding scale   SubCutaneous at bedtime  insulin lispro Injectable (HumaLOG) 10 Unit(s) SubCutaneous before dinner  insulin lispro Injectable (HumaLOG) 8 Unit(s) SubCutaneous before breakfast  insulin lispro Injectable (HumaLOG) 8 Unit(s) SubCutaneous before lunch  predniSONE   Tablet 5 milliGRAM(s) Oral daily    linezolid  IVPB 600 milliGRAM(s) IV Intermittent every 12 hours  meropenem  IVPB 1000 milliGRAM(s) IV Intermittent every 8 hours  nystatin    Suspension 833379 Unit(s) Swish and Swallow four times a day  trimethoprim   80 mG/sulfamethoxazole 400 mG 1 Tablet(s) Oral daily  valGANciclovir 450 milliGRAM(s) Oral daily    Allergies    codeine (Anaphylaxis)      PE:  General: Male lying in bed. NAD.   Vital Signs Last 24 Hrs  T(C): 36.3 (12 Jul 2020 15:00), Max: 36.8 (11 Jul 2020 19:00)  T(F): 97.3 (12 Jul 2020 15:00), Max: 98.2 (11 Jul 2020 19:00)  HR: 79 (12 Jul 2020 16:00) (72 - 82)  BP: 117/56 (12 Jul 2020 16:00) (94/55 - 154/68)  BP(mean): 80 (12 Jul 2020 16:00) (70 - 125)  RR: 19 (12 Jul 2020 16:00) (9 - 28)  SpO2: 92% (12 Jul 2020 16:00) (92% - 100%)  Abd: Soft, NT,ND, Central adiposity. Incision c/d/i  Extremities: Warm  Neuro: A&O X2    LABS:  POCT Blood Glucose.: 219 mg/dL (07-12-20 @ 11:43)  POCT Blood Glucose.: 214 mg/dL (07-12-20 @ 07:28)  POCT Blood Glucose.: 324 mg/dL (07-11-20 @ 21:13)  POCT Blood Glucose.: 281 mg/dL (07-11-20 @ 17:11)  POCT Blood Glucose.: 194 mg/dL (07-11-20 @ 12:20)  POCT Blood Glucose.: 177 mg/dL (07-11-20 @ 08:22)  POCT Blood Glucose.: 249 mg/dL (07-10-20 @ 21:15)  POCT Blood Glucose.: 146 mg/dL (07-10-20 @ 16:51)  POCT Blood Glucose.: 130 mg/dL (07-10-20 @ 12:38)  POCT Blood Glucose.: 123 mg/dL (07-10-20 @ 08:28)  POCT Blood Glucose.: 189 mg/dL (07-09-20 @ 22:14)  POCT Blood Glucose.: 170 mg/dL (07-09-20 @ 20:22)  POCT Blood Glucose.: 187 mg/dL (07-09-20 @ 17:14)                            9.3    5.51  )-----------( 98       ( 12 Jul 2020 01:54 )             28.6       07-12    131<L>  |  95<L>  |  31<H>  ----------------------------<  217<H>  5.0   |  27  |  1.26    Ca    8.1<L>      12 Jul 2020 12:22  Phos  4.0     07-12  Mg     2.0     07-12    TPro  4.4<L>  /  Alb  2.6<L>  /  TBili  1.1  /  DBili  0.5<H>  /  AST  64<H>  /  ALT  98<H>  /  AlkPhos  212<H>  07-12      A1C with Estimated Average Glucose Result: 5.2 % (06-14-20 @ 11:00)  A1C with Estimated Average Glucose Result: 5.4 % (06-12-20 @ 08:26)  A1C with Estimated Average Glucose Result: 4.8 % (04-26-20 @ 09:39)  Hemoglobin A1C, Whole Blood: 6.4 % (02-12-20 @ 17:08)  Hemoglobin A1C, Whole Blood: 5.8 % (12-04-19 @ 08:38)          Contact number: kit [phone] or [phone]

## 2020-07-12 NOTE — PROGRESS NOTE ADULT - ASSESSMENT
64y Male c/b small esophageal varices (12/2019), portal hypertensive gastropathy, ascites, hx SBP, hx hepatic encephalopathy, GAVE syndrome s/p APC, hx duodenal ulcer (5/2019), HTN, HLD, DM, HFpEF (EF 70%), recent ESBL E coli prostate abscess (3/2020 s/p 4 weeks ertapenem), orthostatic hypotension ( on midodrine) who was initially admitted to medicine for hepatic encephalopathy. Hospital course c/b MISA, VRE UTI, acute blood loss anemia, received blood transfusion, EGD done 6/22 with Grade II esophageal varices, acute gastritis with hemorrhage and acute duodenitis with hemorrhage. Patient had worsening mental status, concerning decompensating liver cirrhosis, hepatic encephalopathy patient then transferred to SICU for further care.    PLAN:   Neurologic: tremors, hallucinations, confusion 2/2 ?tacro toxicity  - Neuro status worsening, appears more confused; oxycodone discontinued  - haldol prn   - Promote sleep hygiene nocturnal CPAP ordered to improve sleep and prevent sleep apnea     Respiratory: RLL infiltrate developing  - IS, OOBTC as tolerated  - Nocturnal   - F/u sputum Cx  - MRSA swab negative   - f/u AM CXR   - Continuous pulse oximetry    Cardiovascular: Intermittent hypertension  - Nifedipine 30mg daily  - Labetalol PRN for SBP goal <145  - ASA  - Monitor hemodynamic status    Gastrointestinal/Nutrition: GIB, JEAN cirrhosis, GAVE syndrome. S/p liver transplant   - Regular, consistent carb diet. Tolerating well.  - Protonix 40mg BID  - Trend LFTs q4, uptrending yesterday    Renal: urinary retention, hyponatremia  - C/w flomax for urinary retention  - Trend BMPs, replete lytes prn  - Lasix prn     Heme: coagulopathy  - Venodynes for mechanical VTE prophylaxis, holding chemical VTE prophylaxis in the setting of coagulopathy/GIB.  - Trend H/H, coags    Infectious Disease: immunosuppresion; possible RLL PNA  - VRE on 7/10 bcx   - F/u on final bcx (E. feacalis). repeat bcx today   - Conitnue meropenem, linezolid   - Continue PPx as per transplant Bactrim, valcyte  - Continue immunosuppression as per transplant solumedrol, tacro. Cellcept held yesterday.   - CT C/A/P--> trace pleural effusion, groin hematoma    Endo: DM type II, HLD  - ISS, lantus 14u qhs, 7u premeal/qhs insulin  - Monitor FS premeal and qhs    Disposition:  - SICU full code

## 2020-07-12 NOTE — PROGRESS NOTE ADULT - ATTENDING COMMENTS
VRE growth in blood culture  AM CXR with RLL involvement pl effusion  ammonia 24, mental status improved today, tremors improved  transient transaminase increase now back down  CT chest ap failed to show a clear source for VRE, except a small fluid collection in left groin area under incision  net 1.2 lt neg  has BM  had urinary retention overnight  ANDREINA significant output    - immunosuppresion per transplant  - diuresis per transplant  - cont antimicrobials for now  - cont seroquel   - if u retention again, will place bob again  - follow up repeat bcx

## 2020-07-12 NOTE — PROGRESS NOTE ADULT - ASSESSMENT
65 yo M with hx of T2DM uncontrolled due to steroids x 10 years with neuropathy and R DFU of the heel, HTN, HLD, MGUS, and decompensated JEAN cirrhosis s/p liver x-plant on 7/2/2020 admitted 6/11/2020 with confusion secondary to liver failure and encephalopathy. Endocrine consulted for glycemic control. S/p high dose steroid taper, now on Prednisone 5mg daily. Tolerating POs w/glucose levels elevated despite increased insulin doses. BG goal (100-180mg/dl).

## 2020-07-12 NOTE — PROGRESS NOTE ADULT - ASSESSMENT
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    OLTx  - Good graft function, LFTs trending down  - Delirium ?Steroid psychosis; vs tacro sensitivity?, continue 1:1, Mental status improves today. Discontinue Seroquel  - Immuno: Tac 2/2, MMF 1g bid, Pred taper,   - PPX: valcyte/nystatin/bactrim  - Continue ASA 81mg daily  - LE edema and increased ANDREINA output. Lasix 80 PO BID today  - ID: restarted on mari for concern for RLL infiltrate; CXR w/ improvement; 7/10 bld cx: VRE, on mari/linezolid.   -CT chest/abd/pelvis 7/11 showed L groin fluid collection and b/l small pleural effusions.   -Liver US today; Liver vasculature not visualized well on CT. (LFTs down when compared to labs from yesterday, but up if compared to labs from 7/10).   - h/o UGI bleed: Continue Protonix 40mg IV bid    -Resume MgSo4 to 800mg BID, keep Mag >2  - Regular, carbohydrate restricted diet  - Pain control PRN  - DVT PPx: SCDs at all times    Hyperglycemia  - Appreciate endocrinology recommendations  - Resume Lantus and Humalog insulin wih SSI coverage  - Fingersticks ac and qhs    Hypertension  -Controlled  -Continue Nifedipine    -Heel ulcer  Continue local wound care w/ santyl and DSD as per podiatry    Continue ICU care 64M with IDDM, HLD, obesity, HFpEF with mild LV diastolic dysfunction, MGUS, chronic anemia with a history of duodenal ulcer as well as GAVE and duodenal AVM s/p APC (last on 10/11/19), 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    OLTx  - Good graft function, LFTs trending down  - Delirium ?Steroid psychosis; vs tacro sensitivity?, continue 1:1, Mental status improved, less agitated today. Discontinue Seroquel  - Immuno: Tac 2/2, MMF 1g bid, Pred taper,   - PPX: valcyte/nystatin/bactrim  - Continue ASA 81mg daily  - LE edema and increased ANDREINA output. Lasix 80 PO BID today  - ID: restarted on mari for concern for RLL infiltrate; CXR w/ improvement; 7/10 bld cx: VRE, on mari/linezolid.   -CT chest/abd/pelvis 7/11 showed L groin fluid collection and b/l small pleural effusions.   -Liver US today; Liver vasculature not visualized well on CT. (LFTs down when compared to labs from yesterday, but up if compared to labs from 7/10).   - h/o UGI bleed: Continue Protonix 40mg IV bid    -Resume MgSo4 to 800mg BID, keep Mag >2  - Regular, carbohydrate restricted diet  - Pain control PRN  - DVT PPx: SCDs at all times    Hyperglycemia  - Appreciate endocrinology recommendations  - Resume Lantus and Humalog insulin wih SSI coverage  - Fingersticks ac and qhs    Hypertension  -Controlled  -Continue Nifedipine    -Heel ulcer  Continue local wound care w/ santyl and DSD as per podiatry    Continue ICU care

## 2020-07-12 NOTE — PROGRESS NOTE ADULT - PROBLEM SELECTOR PLAN 1
-test BG AC/HS  -Increase Lantus 16 units QHS  -Adjust Humalog 8-8-10 w/meals  -c/w Humalog low correction scale AC and low HS scale  DISPO: basal/bolus, with doses TBD  -Pt can resume f/u with Dr. Mathur or   [facility] [address] [facility] [address] [place] [phone].

## 2020-07-13 LAB
ALBUMIN SERPL ELPH-MCNC: 2.6 G/DL — LOW (ref 3.3–5)
ALP SERPL-CCNC: 240 U/L — HIGH (ref 40–120)
ALT FLD-CCNC: 119 U/L — HIGH (ref 10–45)
AMMONIA BLD-MCNC: 31 UMOL/L — SIGNIFICANT CHANGE UP (ref 11–55)
ANION GAP SERPL CALC-SCNC: 7 MMOL/L — SIGNIFICANT CHANGE UP (ref 5–17)
APTT BLD: 28.9 SEC — SIGNIFICANT CHANGE UP (ref 27.5–35.5)
AST SERPL-CCNC: 71 U/L — HIGH (ref 10–40)
BILIRUB DIRECT SERPL-MCNC: 0.6 MG/DL — HIGH (ref 0–0.2)
BILIRUB INDIRECT FLD-MCNC: 0.7 MG/DL — SIGNIFICANT CHANGE UP (ref 0.2–1)
BILIRUB SERPL-MCNC: 1.3 MG/DL — HIGH (ref 0.2–1.2)
BUN SERPL-MCNC: 28 MG/DL — HIGH (ref 7–23)
CALCIUM SERPL-MCNC: 8.5 MG/DL — SIGNIFICANT CHANGE UP (ref 8.4–10.5)
CHLORIDE SERPL-SCNC: 98 MMOL/L — SIGNIFICANT CHANGE UP (ref 96–108)
CO2 SERPL-SCNC: 30 MMOL/L — SIGNIFICANT CHANGE UP (ref 22–31)
CREAT SERPL-MCNC: 1.19 MG/DL — SIGNIFICANT CHANGE UP (ref 0.5–1.3)
GLUCOSE BLDC GLUCOMTR-MCNC: 100 MG/DL — HIGH (ref 70–99)
GLUCOSE BLDC GLUCOMTR-MCNC: 111 MG/DL — HIGH (ref 70–99)
GLUCOSE BLDC GLUCOMTR-MCNC: 150 MG/DL — HIGH (ref 70–99)
GLUCOSE BLDC GLUCOMTR-MCNC: 151 MG/DL — HIGH (ref 70–99)
GLUCOSE BLDC GLUCOMTR-MCNC: 172 MG/DL — HIGH (ref 70–99)
GLUCOSE BLDC GLUCOMTR-MCNC: 228 MG/DL — HIGH (ref 70–99)
GLUCOSE BLDC GLUCOMTR-MCNC: 62 MG/DL — LOW (ref 70–99)
GLUCOSE BLDC GLUCOMTR-MCNC: 65 MG/DL — LOW (ref 70–99)
GLUCOSE BLDC GLUCOMTR-MCNC: 80 MG/DL — SIGNIFICANT CHANGE UP (ref 70–99)
GLUCOSE SERPL-MCNC: 121 MG/DL — HIGH (ref 70–99)
HCT VFR BLD CALC: 28.1 % — LOW (ref 39–50)
HGB BLD-MCNC: 9.4 G/DL — LOW (ref 13–17)
INR BLD: 1.15 RATIO — SIGNIFICANT CHANGE UP (ref 0.88–1.16)
MAGNESIUM SERPL-MCNC: 1.7 MG/DL — SIGNIFICANT CHANGE UP (ref 1.6–2.6)
MCHC RBC-ENTMCNC: 31.9 PG — SIGNIFICANT CHANGE UP (ref 27–34)
MCHC RBC-ENTMCNC: 33.5 GM/DL — SIGNIFICANT CHANGE UP (ref 32–36)
MCV RBC AUTO: 95.3 FL — SIGNIFICANT CHANGE UP (ref 80–100)
NRBC # BLD: 0 /100 WBCS — SIGNIFICANT CHANGE UP (ref 0–0)
PHOSPHATE SERPL-MCNC: 3 MG/DL — SIGNIFICANT CHANGE UP (ref 2.5–4.5)
PLATELET # BLD AUTO: 114 K/UL — LOW (ref 150–400)
POTASSIUM SERPL-MCNC: 4.3 MMOL/L — SIGNIFICANT CHANGE UP (ref 3.5–5.3)
POTASSIUM SERPL-SCNC: 4.3 MMOL/L — SIGNIFICANT CHANGE UP (ref 3.5–5.3)
PROCALCITONIN SERPL-MCNC: 0.87 NG/ML — HIGH (ref 0.02–0.1)
PROT SERPL-MCNC: 4.6 G/DL — LOW (ref 6–8.3)
PROTHROM AB SERPL-ACNC: 13.1 SEC — SIGNIFICANT CHANGE UP (ref 10.6–13.6)
RBC # BLD: 2.95 M/UL — LOW (ref 4.2–5.8)
RBC # FLD: 18.7 % — HIGH (ref 10.3–14.5)
SODIUM SERPL-SCNC: 135 MMOL/L — SIGNIFICANT CHANGE UP (ref 135–145)
TACROLIMUS SERPL-MCNC: 6.2 NG/ML — SIGNIFICANT CHANGE UP
WBC # BLD: 4.58 K/UL — SIGNIFICANT CHANGE UP (ref 3.8–10.5)
WBC # FLD AUTO: 4.58 K/UL — SIGNIFICANT CHANGE UP (ref 3.8–10.5)

## 2020-07-13 PROCEDURE — 99233 SBSQ HOSP IP/OBS HIGH 50: CPT

## 2020-07-13 PROCEDURE — 93926 LOWER EXTREMITY STUDY: CPT | Mod: 26,LT

## 2020-07-13 PROCEDURE — 99232 SBSQ HOSP IP/OBS MODERATE 35: CPT

## 2020-07-13 PROCEDURE — 99232 SBSQ HOSP IP/OBS MODERATE 35: CPT | Mod: GC

## 2020-07-13 PROCEDURE — 71045 X-RAY EXAM CHEST 1 VIEW: CPT | Mod: 26

## 2020-07-13 PROCEDURE — 76705 ECHO EXAM OF ABDOMEN: CPT | Mod: 26,LT

## 2020-07-13 RX ORDER — FUROSEMIDE 40 MG
80 TABLET ORAL ONCE
Refills: 0 | Status: COMPLETED | OUTPATIENT
Start: 2020-07-13 | End: 2020-07-13

## 2020-07-13 RX ORDER — TRAMADOL HYDROCHLORIDE 50 MG/1
25 TABLET ORAL ONCE
Refills: 0 | Status: DISCONTINUED | OUTPATIENT
Start: 2020-07-13 | End: 2020-07-13

## 2020-07-13 RX ORDER — QUETIAPINE FUMARATE 200 MG/1
50 TABLET, FILM COATED ORAL ONCE
Refills: 0 | Status: COMPLETED | OUTPATIENT
Start: 2020-07-13 | End: 2020-07-13

## 2020-07-13 RX ORDER — DEXMEDETOMIDINE HYDROCHLORIDE IN 0.9% SODIUM CHLORIDE 4 UG/ML
0.5 INJECTION INTRAVENOUS
Qty: 200 | Refills: 0 | Status: DISCONTINUED | OUTPATIENT
Start: 2020-07-13 | End: 2020-07-14

## 2020-07-13 RX ORDER — TACROLIMUS 5 MG/1
3 CAPSULE ORAL
Refills: 0 | Status: DISCONTINUED | OUTPATIENT
Start: 2020-07-13 | End: 2020-07-18

## 2020-07-13 RX ORDER — MAGNESIUM SULFATE 500 MG/ML
2 VIAL (ML) INJECTION ONCE
Refills: 0 | Status: COMPLETED | OUTPATIENT
Start: 2020-07-13 | End: 2020-07-13

## 2020-07-13 RX ORDER — URSODIOL 250 MG/1
300 TABLET, FILM COATED ORAL
Refills: 0 | Status: DISCONTINUED | OUTPATIENT
Start: 2020-07-13 | End: 2020-07-20

## 2020-07-13 RX ORDER — HALOPERIDOL DECANOATE 100 MG/ML
5 INJECTION INTRAMUSCULAR ONCE
Refills: 0 | Status: COMPLETED | OUTPATIENT
Start: 2020-07-13 | End: 2020-07-13

## 2020-07-13 RX ORDER — FUROSEMIDE 40 MG
80 TABLET ORAL EVERY 12 HOURS
Refills: 0 | Status: DISCONTINUED | OUTPATIENT
Start: 2020-07-13 | End: 2020-07-13

## 2020-07-13 RX ORDER — INSULIN LISPRO 100/ML
5 VIAL (ML) SUBCUTANEOUS
Refills: 0 | Status: DISCONTINUED | OUTPATIENT
Start: 2020-07-13 | End: 2020-07-14

## 2020-07-13 RX ORDER — MIRTAZAPINE 45 MG/1
15 TABLET, ORALLY DISINTEGRATING ORAL AT BEDTIME
Refills: 0 | Status: COMPLETED | OUTPATIENT
Start: 2020-07-13 | End: 2020-07-13

## 2020-07-13 RX ADMIN — URSODIOL 300 MILLIGRAM(S): 250 TABLET, FILM COATED ORAL at 17:00

## 2020-07-13 RX ADMIN — Medication 100000 UNIT(S): at 00:00

## 2020-07-13 RX ADMIN — Medication 100000 UNIT(S): at 05:00

## 2020-07-13 RX ADMIN — DEXMEDETOMIDINE HYDROCHLORIDE IN 0.9% SODIUM CHLORIDE 12.9 MICROGRAM(S)/KG/HR: 4 INJECTION INTRAVENOUS at 22:49

## 2020-07-13 RX ADMIN — Medication 8 UNIT(S): at 07:43

## 2020-07-13 RX ADMIN — NYSTATIN CREAM 1 APPLICATION(S): 100000 CREAM TOPICAL at 05:01

## 2020-07-13 RX ADMIN — TAMSULOSIN HYDROCHLORIDE 0.8 MILLIGRAM(S): 0.4 CAPSULE ORAL at 21:13

## 2020-07-13 RX ADMIN — INSULIN GLARGINE 16 UNIT(S): 100 INJECTION, SOLUTION SUBCUTANEOUS at 21:14

## 2020-07-13 RX ADMIN — Medication 1 TABLET(S): at 12:02

## 2020-07-13 RX ADMIN — TACROLIMUS 3 MILLIGRAM(S): 5 CAPSULE ORAL at 07:43

## 2020-07-13 RX ADMIN — Medication 8 UNIT(S): at 12:02

## 2020-07-13 RX ADMIN — VALGANCICLOVIR 450 MILLIGRAM(S): 450 TABLET, FILM COATED ORAL at 12:02

## 2020-07-13 RX ADMIN — Medication 100000 UNIT(S): at 17:00

## 2020-07-13 RX ADMIN — HALOPERIDOL DECANOATE 5 MILLIGRAM(S): 100 INJECTION INTRAMUSCULAR at 02:31

## 2020-07-13 RX ADMIN — CHLORHEXIDINE GLUCONATE 1 APPLICATION(S): 213 SOLUTION TOPICAL at 05:00

## 2020-07-13 RX ADMIN — TACROLIMUS 3 MILLIGRAM(S): 5 CAPSULE ORAL at 20:14

## 2020-07-13 RX ADMIN — MIRTAZAPINE 15 MILLIGRAM(S): 45 TABLET, ORALLY DISINTEGRATING ORAL at 21:13

## 2020-07-13 RX ADMIN — QUETIAPINE FUMARATE 50 MILLIGRAM(S): 200 TABLET, FILM COATED ORAL at 12:01

## 2020-07-13 RX ADMIN — Medication 1 APPLICATION(S): at 12:02

## 2020-07-13 RX ADMIN — Medication 100000 UNIT(S): at 12:01

## 2020-07-13 RX ADMIN — NYSTATIN CREAM 1 APPLICATION(S): 100000 CREAM TOPICAL at 17:01

## 2020-07-13 RX ADMIN — Medication 81 MILLIGRAM(S): at 12:01

## 2020-07-13 RX ADMIN — QUETIAPINE FUMARATE 25 MILLIGRAM(S): 200 TABLET, FILM COATED ORAL at 00:00

## 2020-07-13 RX ADMIN — Medication 80 MILLIGRAM(S): at 12:03

## 2020-07-13 RX ADMIN — PANTOPRAZOLE SODIUM 40 MILLIGRAM(S): 20 TABLET, DELAYED RELEASE ORAL at 05:00

## 2020-07-13 RX ADMIN — Medication 50 GRAM(S): at 07:42

## 2020-07-13 RX ADMIN — Medication 100000 UNIT(S): at 23:57

## 2020-07-13 RX ADMIN — PANTOPRAZOLE SODIUM 40 MILLIGRAM(S): 20 TABLET, DELAYED RELEASE ORAL at 17:00

## 2020-07-13 RX ADMIN — TRAMADOL HYDROCHLORIDE 25 MILLIGRAM(S): 50 TABLET ORAL at 06:05

## 2020-07-13 NOTE — PROGRESS NOTE ADULT - ASSESSMENT
64 M hx of DM, HLD, GERD, HFpEF with mild LV diastolic dysfunction, and decompensated JEAN cirrhosis c/b ascites with hx of SBP, HE, duodenal ulcer, GAVE and duodenal AVM s/p APC (last on 10/11/19), UNOS listed for liver transplantation at [facility]. 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 He is now re-admitted with hepatic encephalopathy and VRE UTI, also with MISA on CKD. S/p liver transplant 7/2.    Impression:  #S/p liver transplant 7/2: liver enzymes continue to downtrend  OR details:  - L groin cutdown for vv bypass   - anastomosis: bicaval end-end/CBD duct-duct/HA & PV end-end, all standard anatomy.    - IOC with good flow  - Simulect induction. 500mg Solumedrol  - JPs x3 with T-Tube  - 14U pRBC, 14U FFP, 10 PLT, 11 CRYO, 500mL returned from cell saver, EBL 5L, UOP 1100mL  Recipient Info:   ABO: A  CMV:  Neg  EBV Positive  Donor:  Donor ID:  [id number] Match: [id number]  Age: 29 ABO:  A1 High Risk:   No COD: Cardiovascular  Anti CMV positive, EBV IgG- positive  HepBcAb-neg, Hepatitis C-DANA- neg, Hepatitis C ab-neg  # VRE on culture from 7/10: on mari and linezolid. CT showing groin hematoma  #Tremors: improved but ongoign symptoms c/f taco toxicity +/- prednisone and opioid effects. May need medication change based on clinical course  #Halluciniations/agitation: resolved hallucinations but still agitated c/f prednisone toxicity. Improved overall with tapering dose  #GI bleed - Hgb stable. No overt bleeding. 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 GAVE and PHG should have improved w/ liver transplant, most likely etiology is NG tube trauma prior if any c/f GI bleed  # Abnormal Chest X-Ray c/f evolving pna vs atelectasis: now on meropenem  # Hypertension on nifedipine now. BP improved  # Lower ext edema: diuresing, improving on lasix  #R posterior heel wound w/ overlying eschar – on meropenem for 1 more day. Local wound care. no signs of infections, XR neg for gas, evaluated by podiatry and ID. MRI w/o contrast limited, but no overt signs of active infection.  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  # Hyperglycemia with episodes of overcorrection    Recommendation:  - meropenem/linezolid per transplant ID  - continue with standing magnesium  - diuresis with Lasix 80 mg BID  - monitor renal function  - prednisone 5 mg po daily  - CellCept on hold given bacteremia  - continue with tacro dosing per transplant surgery  - consider switch to CsA given ongoing neurotoxicity from tacro  - glycemic control with insulin, monitor for hypoglycemia  - c/w nystatin, Bactrim, valcyte ppx  - psychosis management per primary team  - trend Hb- PO PPI BID, monitor bowel movements  - aspirin per surgery  - advance diet, low Na diet  - wound care/nursing for R heel ulcer  - continue with 1:1 for agitation precaution  - f/u transplant surgery recs  - rest of care per SICU team  - transplant hepatology will follow 64 M hx of DM, HLD, GERD, HFpEF with mild LV diastolic dysfunction, and decompensated JEAN cirrhosis c/b ascites with hx of SBP, HE, duodenal ulcer, GAVE and duodenal AVM s/p APC (last on 10/11/19), UNOS listed for liver transplantation at [facility]. 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 He is now re-admitted with hepatic encephalopathy and VRE UTI, also with MISA on CKD. S/p liver transplant 7/2.    Impression:  #S/p liver transplant 7/2: liver enzymes continue to downtrend  OR details:  - L groin cutdown for vv bypass   - anastomosis: bicaval end-end/CBD duct-duct/HA & PV end-end, all standard anatomy.    - IOC with good flow  - Simulect induction. 500mg Solumedrol  - JPs x3 with T-Tube  - 14U pRBC, 14U FFP, 10 PLT, 11 CRYO, 500mL returned from cell saver, EBL 5L, UOP 1100mL  Recipient Info:   ABO: A  CMV:  Neg  EBV Positive  Donor:  Donor ID:  [id number] Match: [id number]  Age: 29 ABO:  A1 High Risk:   No COD: Cardiovascular  Anti CMV positive, EBV IgG- positive  HepBcAb-neg, Hepatitis C-DANA- neg, Hepatitis C ab-neg  # Elevated liver enzymes: possible secondary to DILI vs sepsis related. Now with VRE and on linezolid and meropenem  # VRE on culture from 7/10: on mari and linezolid. CT showing groin hematoma  #Tremors: improved but ongoign symptoms c/f taco toxicity +/- prednisone and opioid effects. May need medication change based on clinical course  #Halluciniations/agitation: resolved hallucinations but still agitated c/f prednisone toxicity. Improved overall with tapering dose  #GI bleed - Hgb stable. No overt bleeding. 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 GAVE and PHG should have improved w/ liver transplant, most likely etiology is NG tube trauma prior if any c/f GI bleed  # Abnormal Chest X-Ray c/f evolving pna vs atelectasis: now on meropenem  # Hypertension on nifedipine now. BP improved  # Lower ext edema: diuresing, improving on lasix  #R posterior heel wound w/ overlying eschar – on meropenem for 1 more day. Local wound care. no signs of infections, XR neg for gas, evaluated by podiatry and ID. MRI w/o contrast limited, but no overt signs of active infection.  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  # Hyperglycemia with episodes of overcorrection    Recommendation:  - meropenem/linezolid per transplant ID; plan to d/c meropenem today given elevated liver enzymes  - continue with standing magnesium  - diuresis with Lasix 80 mg BID  - monitor renal function  - prednisone 5 mg po daily  - CellCept on hold given bacteremia  - continue with tacro dosing per transplant surgery  - consider switch to CsA given ongoing neurotoxicity from tacro  - glycemic control with insulin, monitor for hypoglycemia  - c/w nystatin, Bactrim, valcyte ppx  - psychosis management per primary team  - trend Hb- PO PPI BID, monitor bowel movements  - aspirin per surgery  - advance diet, low Na diet  - wound care/nursing for R heel ulcer  - continue with 1:1 for agitation precaution  - f/u transplant surgery recs  - rest of care per SICU team  - transplant hepatology will follow

## 2020-07-13 NOTE — PROGRESS NOTE ADULT - SUBJECTIVE AND OBJECTIVE BOX
Chief Complaint/Follow-up on:     Subjective:    MEDICATIONS  (STANDING):  aspirin enteric coated 81 milliGRAM(s) Oral daily  chlorhexidine 2% Cloths 1 Application(s) Topical <User Schedule>  collagenase Ointment 1 Application(s) Topical daily  insulin glargine Injectable (LANTUS) 16 Unit(s) SubCutaneous at bedtime  insulin lispro (HumaLOG) corrective regimen sliding scale   SubCutaneous three times a day before meals  insulin lispro (HumaLOG) corrective regimen sliding scale   SubCutaneous at bedtime  insulin lispro Injectable (HumaLOG) 10 Unit(s) SubCutaneous before dinner  insulin lispro Injectable (HumaLOG) 8 Unit(s) SubCutaneous before breakfast  insulin lispro Injectable (HumaLOG) 8 Unit(s) SubCutaneous before lunch  linezolid  IVPB 600 milliGRAM(s) IV Intermittent every 12 hours  magnesium oxide 800 milliGRAM(s) Oral two times a day with meals  meropenem  IVPB 1000 milliGRAM(s) IV Intermittent every 8 hours  mirtazapine 15 milliGRAM(s) Oral at bedtime  nystatin    Suspension 220754 Unit(s) Swish and Swallow four times a day  nystatin Cream 1 Application(s) Topical two times a day  pantoprazole  Injectable 40 milliGRAM(s) IV Push every 12 hours  predniSONE   Tablet 5 milliGRAM(s) Oral daily  tacrolimus 3 milliGRAM(s) Oral <User Schedule>  tamsulosin 0.8 milliGRAM(s) Oral at bedtime  trimethoprim   80 mG/sulfamethoxazole 400 mG 1 Tablet(s) Oral daily  ursodiol Capsule 300 milliGRAM(s) Oral two times a day  valGANciclovir 450 milliGRAM(s) Oral daily    MEDICATIONS  (PRN):      PHYSICAL EXAM:  VITALS: T(C): 36.7 (07-13-20 @ 15:00)  T(F): 98.1 (07-13-20 @ 15:00), Max: 98.2 (07-12-20 @ 23:00)  HR: 74 (07-13-20 @ 16:23) (70 - 80)  BP: 114/57 (07-13-20 @ 16:00) (97/54 - 184/123)  RR:  (12 - 32)  SpO2:  (94% - 100%)  Wt(kg): --  GENERAL: NAD, well-groomed, well-developed  EYES: No proptosis, no injection  HEENT:  Atraumatic, Normocephalic, moist mucous membranes  THYROID: Normal size, no palpable nodules  RESPIRATORY: Clear to auscultation bilaterally; No rales, rhonchi, wheezing, or rubs  CARDIOVASCULAR: Regular rate and rhythm; No murmurs; no peripheral edema  GI: Soft, nontender, non distended, normal bowel sounds  CUSHING'S SIGNS: no striae    POCT Blood Glucose.: 80 mg/dL (07-13-20 @ 16:58)  POCT Blood Glucose.: 62 mg/dL (07-13-20 @ 16:42)  POCT Blood Glucose.: 65 mg/dL (07-13-20 @ 16:42)  POCT Blood Glucose.: 172 mg/dL (07-13-20 @ 13:30)  POCT Blood Glucose.: 150 mg/dL (07-13-20 @ 12:00)  POCT Blood Glucose.: 111 mg/dL (07-13-20 @ 07:40)  POCT Blood Glucose.: 228 mg/dL (07-12-20 @ 23:59)  POCT Blood Glucose.: 198 mg/dL (07-12-20 @ 21:39)  POCT Blood Glucose.: 239 mg/dL (07-12-20 @ 17:09)  POCT Blood Glucose.: 219 mg/dL (07-12-20 @ 11:43)  POCT Blood Glucose.: 214 mg/dL (07-12-20 @ 07:28)  POCT Blood Glucose.: 324 mg/dL (07-11-20 @ 21:13)  POCT Blood Glucose.: 281 mg/dL (07-11-20 @ 17:11)  POCT Blood Glucose.: 194 mg/dL (07-11-20 @ 12:20)  POCT Blood Glucose.: 177 mg/dL (07-11-20 @ 08:22)  POCT Blood Glucose.: 249 mg/dL (07-10-20 @ 21:15)    07-13    135  |  98  |  28<H>  ----------------------------<  121<H>  4.3   |  30  |  1.19    EGFR if : 74  EGFR if non : 64    Ca    8.5      07-13  Mg     1.7     07-13  Phos  3.0     07-13    TPro  4.6<L>  /  Alb  2.6<L>  /  TBili  1.3<H>  /  DBili  0.6<H>  /  AST  71<H>  /  ALT  119<H>  /  AlkPhos  240<H>  07-13          Thyroid Function Tests: Chief Complaint/Follow-up on: T2D    Subjective: Patient delirious today. Not verbal but moves his arms around when I ask about examining him. Per RN he had been eating well today. On f/u at dinner he went low.     MEDICATIONS  (STANDING):  aspirin enteric coated 81 milliGRAM(s) Oral daily  chlorhexidine 2% Cloths 1 Application(s) Topical <User Schedule>  collagenase Ointment 1 Application(s) Topical daily  insulin glargine Injectable (LANTUS) 16 Unit(s) SubCutaneous at bedtime  insulin lispro (HumaLOG) corrective regimen sliding scale   SubCutaneous three times a day before meals  insulin lispro (HumaLOG) corrective regimen sliding scale   SubCutaneous at bedtime  insulin lispro Injectable (HumaLOG) 10 Unit(s) SubCutaneous before dinner  insulin lispro Injectable (HumaLOG) 8 Unit(s) SubCutaneous before breakfast  insulin lispro Injectable (HumaLOG) 8 Unit(s) SubCutaneous before lunch  linezolid  IVPB 600 milliGRAM(s) IV Intermittent every 12 hours  magnesium oxide 800 milliGRAM(s) Oral two times a day with meals  meropenem  IVPB 1000 milliGRAM(s) IV Intermittent every 8 hours  mirtazapine 15 milliGRAM(s) Oral at bedtime  nystatin    Suspension 606364 Unit(s) Swish and Swallow four times a day  nystatin Cream 1 Application(s) Topical two times a day  pantoprazole  Injectable 40 milliGRAM(s) IV Push every 12 hours  predniSONE   Tablet 5 milliGRAM(s) Oral daily  tacrolimus 3 milliGRAM(s) Oral <User Schedule>  tamsulosin 0.8 milliGRAM(s) Oral at bedtime  trimethoprim   80 mG/sulfamethoxazole 400 mG 1 Tablet(s) Oral daily  ursodiol Capsule 300 milliGRAM(s) Oral two times a day  valGANciclovir 450 milliGRAM(s) Oral daily    MEDICATIONS  (PRN):      PHYSICAL EXAM:  VITALS: T(C): 36.7 (07-13-20 @ 15:00)  T(F): 98.1 (07-13-20 @ 15:00), Max: 98.2 (07-12-20 @ 23:00)  HR: 74 (07-13-20 @ 16:23) (70 - 80)  BP: 114/57 (07-13-20 @ 16:00) (97/54 - 184/123)  RR:  (12 - 32)  SpO2:  (94% - 100%)  Wt(kg): --  GENERAL: NAD, well-groomed, well-developed  HEENT:  Atraumatic, Normocephalic, moist mucous membranes  RESPIRATORY: Clear to auscultation bilaterally; No rales, rhonchi, wheezing, or rubs  CARDIOVASCULAR: Regular rate and rhythm; No murmurs  GI: Soft, nontender, non distended, normal bowel sounds      POCT Blood Glucose.: 80 mg/dL (07-13-20 @ 16:58)  POCT Blood Glucose.: 62 mg/dL (07-13-20 @ 16:42)  POCT Blood Glucose.: 65 mg/dL (07-13-20 @ 16:42)  POCT Blood Glucose.: 172 mg/dL (07-13-20 @ 13:30)  POCT Blood Glucose.: 150 mg/dL (07-13-20 @ 12:00)  POCT Blood Glucose.: 111 mg/dL (07-13-20 @ 07:40)  POCT Blood Glucose.: 228 mg/dL (07-12-20 @ 23:59)  POCT Blood Glucose.: 198 mg/dL (07-12-20 @ 21:39)  POCT Blood Glucose.: 239 mg/dL (07-12-20 @ 17:09)  POCT Blood Glucose.: 219 mg/dL (07-12-20 @ 11:43)  POCT Blood Glucose.: 214 mg/dL (07-12-20 @ 07:28)  POCT Blood Glucose.: 324 mg/dL (07-11-20 @ 21:13)  POCT Blood Glucose.: 281 mg/dL (07-11-20 @ 17:11)  POCT Blood Glucose.: 194 mg/dL (07-11-20 @ 12:20)  POCT Blood Glucose.: 177 mg/dL (07-11-20 @ 08:22)  POCT Blood Glucose.: 249 mg/dL (07-10-20 @ 21:15)    07-13    135  |  98  |  28<H>  ----------------------------<  121<H>  4.3   |  30  |  1.19    EGFR if : 74  EGFR if non : 64    Ca    8.5      07-13  Mg     1.7     07-13  Phos  3.0     07-13    TPro  4.6<L>  /  Alb  2.6<L>  /  TBili  1.3<H>  /  DBili  0.6<H>  /  AST  71<H>  /  ALT  119<H>  /  AlkPhos  240<H>  07-13

## 2020-07-13 NOTE — PROGRESS NOTE ADULT - SUBJECTIVE AND OBJECTIVE BOX
Follow Up:  VRE Bacteremia, s/p OLT    Interval History: remains confused and tremulous. denies any dysuria or urinary symptoms. denies any pain.     REVIEW OF SYSTEMS  [  ] ROS unobtainable because:    [x] All other systems negative except as noted below    Constitutional:  [ ] fever [ ] chills  [ ] weight loss  [ ] weakness  Skin:  [ ] rash [ ] phlebitis	  Eyes: [ ] icterus [ ] pain  [ ] discharge	  ENMT: [ ] sore throat  [ ] thrush [ ] ulcers [ ] exudates  Respiratory: [ ] dyspnea [ ] hemoptysis [ ] cough [ ] sputum	  Cardiovascular:  [ ] chest pain [ ] palpitations [ ] edema	  Gastrointestinal:  [ ] nausea [ ] vomiting [ ] diarrhea [ ] constipation [ ] pain	  Genitourinary:  [ ] dysuria [ ] frequency [ ] hematuria [ ] discharge [ ] flank pain  [ ] incontinence  Musculoskeletal:  [ ] myalgias [ ] arthralgias [ ] arthritis  [ ] back pain  Neurological:  [ ] headache [ ] seizures  [x ] confusion/altered mental status    Allergies  codeine (Anaphylaxis)        ANTIMICROBIALS:  linezolid  IVPB 600 every 12 hours  meropenem  IVPB 1000 every 8 hours  nystatin    Suspension 446216 four times a day  trimethoprim   80 mG/sulfamethoxazole 400 mG 1 daily  valGANciclovir 450 daily      OTHER MEDS:  MEDICATIONS  (STANDING):  aspirin enteric coated 81 daily  insulin glargine Injectable (LANTUS) 16 at bedtime  insulin lispro (HumaLOG) corrective regimen sliding scale  three times a day before meals  insulin lispro (HumaLOG) corrective regimen sliding scale  at bedtime  insulin lispro Injectable (HumaLOG) 10 before dinner  insulin lispro Injectable (HumaLOG) 8 before breakfast  insulin lispro Injectable (HumaLOG) 8 before lunch  mirtazapine 15 at bedtime  pantoprazole  Injectable 40 every 12 hours  predniSONE   Tablet 5 daily  tacrolimus 3 <User Schedule>  tamsulosin 0.8 at bedtime  ursodiol Capsule 300 two times a day      Vital Signs Last 24 Hrs  T(C): 36.7 (13 Jul 2020 15:00), Max: 36.8 (12 Jul 2020 23:00)  T(F): 98.1 (13 Jul 2020 15:00), Max: 98.2 (12 Jul 2020 23:00)  HR: 76 (13 Jul 2020 17:00) (70 - 80)  BP: 121/81 (13 Jul 2020 17:00) (97/54 - 184/123)  BP(mean): 97 (13 Jul 2020 17:00) (72 - 147)  RR: 14 (13 Jul 2020 17:00) (12 - 32)  SpO2: 99% (13 Jul 2020 17:00) (94% - 100%)    PHYSICAL EXAMINATION:  General: Alert and Awake, Is tremulous on examination and somewhat lethargic   HEENT: PERRL, EOMI  Neck: Supple  Cardiac: RRR, No M/R/G  Resp: CTAB, No Wh/Rh/Ra  Abdomen: OLT staple line appears clean without erythema or drainage. No tenderness to palpation. Soft. No guarding/rigidity/tenderness.  : +condom catheter, L groin with staple surgical line - yellow drainage on dressing but no drainage expressed. indurated and with some tenderness to palpation  MSK: No LE edema. No Calf tenderness  Skin: No rashes or lesions. Skin is warm and dry to the touch.   Neuro: Alert and Awake. CN 2-12 Grossly intact. Moves all four extremities spontaneously.  Psych: Calm, Pleasant, Cooperative                          9.4    4.58  )-----------( 114      ( 13 Jul 2020 05:25 )             28.1       07-13    135  |  98  |  28<H>  ----------------------------<  121<H>  4.3   |  30  |  1.19    Ca    8.5      13 Jul 2020 05:25  Phos  3.0     07-13  Mg     1.7     07-13    TPro  4.6<L>  /  Alb  2.6<L>  /  TBili  1.3<H>  /  DBili  0.6<H>  /  AST  71<H>  /  ALT  119<H>  /  AlkPhos  240<H>  07-13          MICROBIOLOGY:  v  .Blood Blood-Peripheral  07-12-20   No growth to date.  --  --      .Urine Catheterized  07-11-20   No growth  --  --      .Blood Blood-Peripheral  07-10-20   Growth in aerobic bottle: Enterococcus faecium (vancomycin resistant)  "Due to technical problems, Proteus sp. will Not be reported as part of  the BCID panel until further notice"  ***Blood Panel PCR results on this specimen are available  approximately 3 hours after the Gram stain result.***  Gram stain, PCR, and/or culture results may not always  correspond due to difference in methodologies.  ************************************************************  This PCR assay was performed using [employer].  The following targets are tested for: Enterococcus,  vancomycin resistant enterococci, Listeria monocytogenes,  coagulase negative staphylococci, S. aureus,  methicillin resistant S. aureus, Streptococcus agalactiae  (Group B), S. pneumoniae, S. pyogenes (Group A),  Acinetobacter baumannii, Enterobacter cloacae, E. coli,  Klebsiella oxytoca, K. pneumoniae, Proteus sp.,  Serratia marcescens, Haemophilus influenzae,  Neisseria meningitidis, Pseudomonas aeruginosa, Candida  albicans, C. glabrata, C krusei, C parapsilosis,  C. tropicalis and the KPC resistance gene.  --  Blood Culture PCR  Enterococcus faecium (vancomycin resistant)      .Blood Blood  07-03-20   No Growth Final  --  --      .Urine Catheterized  07-03-20   No growth  --  --      .Other Other  07-03-20   No growth  --  --      .Blood Blood  06-24-20   No Growth Final  --  --      .Urine Clean Catch (Midstream)  06-21-20   <10,000 CFU/ml of other organisms  --  --      .Blood Blood-Peripheral  06-21-20   No Growth Final  --  --      .Urine Catheterized  06-14-20   >100,000 CFU/ml Enterococcus faecium (vancomycin resistant)  --  Enterococcus faecium (vancomycin resistant)      .Urine Clean Catch (Midstream)  06-14-20   >100,000 CFU/ml Enterococcus faecium (vancomycin resistant)  --  Enterococcus faecium (vancomycin resistant)        CMV IgG Antibody: <0.20 U/mL (12-04-19 @ 08:33)  Toxoplasma IgG Screen: <3.0 IU/mL (12-04-19 @ 08:33)          RADIOLOGY:    <The imaging below has been reviewed and visualized by me independently. Findings as detailed in report below>      EXAM:  CT CHEST IC                        EXAM:  CT ABDOMEN AND PELVIS IC                        PROCEDURE DATE:  07/11/2020    Small bilateral pleural effusions, right greater than left, with associated compressive atelectasis.  Status post liver transplant.   Small volume ascites.  Varices again noted.  Nonobstructing intrarenal calculi.  Partially imaged fluid collection in the left groin with overlying skin staples.

## 2020-07-13 NOTE — PROGRESS NOTE ADULT - SUBJECTIVE AND OBJECTIVE BOX
SICU Daily Progress Note  =====================================================  Interval/Overnight Events:   - Lantus 5 given in addition to 16U due to elevated glucose 200s, Lantus 16 and Premeal 8  - Beasley reinserted, Flomax increased to 0.8  - Seroquel 25 overnight for sleep  - Tacro level 6.3, Tacro 3 and 2  - F/u abdominal U/S for graft patency  - Lasix 80 PO x2  - BCx x2 sent    HPI:   64y Male c/b small esophageal varices (12/2019), portal hypertensive gastropathy, ascites, hx SBP, hx hepatic encephalopathy, GAVE syndrome s/p APC, hx duodenal ulcer (5/2019), HTN, HLD, DM, HFpEF (EF 70%), recent ESBL E coli prostate abscess (3/2020 s/p 4 weeks ertapenem), orthostatic hypotension ( on midodrine) who was initially admitted to medicine for hepatic encephalopathy. Hospital course c/b MISA, VRE UTI, acute blood loss anemia, received blood transfusion, EGD done 6/22 with Grade II esophageal varices, acute gastritis with hemorrhage and acute duodenitis with hemorrhage. Patient had worsening mental status, concerning decompensating liver cirrhosis, hepatic encephalopathy patient then transferred to SICU for further care. He is now s/p OLT complicated by delerium, bacteremia, hyper/hypoglycemia        Allergies: codeine (Anaphylaxis)      MEDICATIONS:   --------------------------------------------------------------------------------------  Neurologic Medications    Respiratory Medications    Cardiovascular Medications  tamsulosin 0.8 milliGRAM(s) Oral at bedtime    Gastrointestinal Medications  magnesium oxide 800 milliGRAM(s) Oral two times a day with meals  pantoprazole  Injectable 40 milliGRAM(s) IV Push every 12 hours    Genitourinary Medications    Hematologic/Oncologic Medications  aspirin enteric coated 81 milliGRAM(s) Oral daily  tacrolimus 3 milliGRAM(s) Oral <User Schedule>  tacrolimus 2 milliGRAM(s) Oral <User Schedule>    Antimicrobial/Immunologic Medications  linezolid  IVPB 600 milliGRAM(s) IV Intermittent every 12 hours  meropenem  IVPB 1000 milliGRAM(s) IV Intermittent every 8 hours  nystatin    Suspension 281229 Unit(s) Swish and Swallow four times a day  trimethoprim   80 mG/sulfamethoxazole 400 mG 1 Tablet(s) Oral daily  valGANciclovir 450 milliGRAM(s) Oral daily    Endocrine/Metabolic Medications  insulin glargine Injectable (LANTUS) 16 Unit(s) SubCutaneous at bedtime  insulin lispro (HumaLOG) corrective regimen sliding scale   SubCutaneous three times a day before meals  insulin lispro (HumaLOG) corrective regimen sliding scale   SubCutaneous at bedtime  insulin lispro Injectable (HumaLOG) 10 Unit(s) SubCutaneous before dinner  insulin lispro Injectable (HumaLOG) 8 Unit(s) SubCutaneous before breakfast  insulin lispro Injectable (HumaLOG) 8 Unit(s) SubCutaneous before lunch  predniSONE   Tablet 5 milliGRAM(s) Oral daily    Topical/Other Medications  chlorhexidine 2% Cloths 1 Application(s) Topical <User Schedule>  collagenase Ointment 1 Application(s) Topical daily  nystatin Cream 1 Application(s) Topical two times a day    --------------------------------------------------------------------------------------    VITAL SIGNS, INS/OUTS (last 24 hours):  --------------------------------------------------------------------------------------  ICU Vital Signs Last 24 Hrs  T(C): 36.8 (12 Jul 2020 23:00), Max: 36.8 (12 Jul 2020 23:00)  T(F): 98.2 (12 Jul 2020 23:00), Max: 98.2 (12 Jul 2020 23:00)  HR: 74 (13 Jul 2020 01:00) (72 - 81)  BP: 106/71 (13 Jul 2020 01:00) (94/55 - 154/68)  BP(mean): 83 (13 Jul 2020 01:00) (70 - 125)  ABP: --  ABP(mean): --  RR: 27 (13 Jul 2020 01:00) (12 - 28)  SpO2: 100% (13 Jul 2020 01:00) (91% - 100%)    --------------------------------------------------------------------------------------    EXAM  NEUROLOGY  Exam: Confused and dioriented, no focal deficits. persistent tremor, TORREZ spontaneously     HEENT  Exam: Normocephalic, atraumatic    RESPIRATORY  Exam: Lungs clear to auscultation, Normal expansion/effort.  Mechanical Ventilation: N/A    CARDIOVASCULAR  Exam: S1, S2.  Regular rate and rhythm.     GI/NUTRITION  Exam: Abdomen soft, Non-tender, Non-distended  Wound: c/d/i  Current Diet: Consistent carb diet    VASCULAR  Exam: Extremities warm, pink, well-perfused. Pitting edema in bilateral lower extremities     MUSCULOSKELETAL  Exam: All extremities moving spontaneously without limitations    SKIN  Exam: Good skin turgor, no skin breakdown.    METABOLIC/FLUIDS/ELECTROLYTES  magnesium oxide 800 milliGRAM(s) Oral two times a day with meals      HEMATOLOGIC  [x] VTE Prophylaxis: aspirin enteric coated 81 milliGRAM(s) Oral daily    Transfusions:	[] PRBC	[] Platelets		[] FFP	[] Cryoprecipitate    INFECTIOUS DISEASE  Antimicrobials/Immunologic Medications:  linezolid  IVPB 600 milliGRAM(s) IV Intermittent every 12 hours  meropenem  IVPB 1000 milliGRAM(s) IV Intermittent every 8 hours  nystatin    Suspension 804067 Unit(s) Swish and Swallow four times a day  tacrolimus 3 milliGRAM(s) Oral <User Schedule>  tacrolimus 2 milliGRAM(s) Oral <User Schedule>  trimethoprim   80 mG/sulfamethoxazole 400 mG 1 Tablet(s) Oral daily  valGANciclovir 450 milliGRAM(s) Oral daily      Tubes/Lines/Drains   [x] Peripheral IV  [] Central Venous Line     	[] R	[] L	[] IJ	[] Fem	[] SC	Date Placed:   [] Arterial Line		[] R	[] L	[] Fem	[] Rad	[] Ax	Date Placed:   [] PICC		[] Midline		[] Mediport  [x] Urinary Catheter		Date Placed:   [x] Necessity of urinary, arterial, and venous catheters discussed    LABS  --------------------------------------------------------------------------------------  07-12    131<L>  |  95<L>  |  31<H>  ----------------------------<  217<H>  5.0   |  27  |  1.26    Ca    8.1<L>      12 Jul 2020 12:22  Phos  4.0     07-12  Mg     2.0     07-12    TPro  4.4<L>  /  Alb  2.6<L>  /  TBili  1.1  /  DBili  0.5<H>  /  AST  64<H>  /  ALT  98<H>  /  AlkPhos  212<H>  07-12                          9.3    5.51  )-----------( 98       ( 12 Jul 2020 01:54 )             28.6     PT/INR - ( 12 Jul 2020 01:54 )   PT: 12.7 sec;   INR: 1.12 ratio         PTT - ( 12 Jul 2020 01:54 )  PTT:28.7 sec  --------------------------------------------------------------------------------------    OTHER LABORATORY:     IMAGING STUDIES:   CXR:   < from: Xray Chest 1 View- PORTABLE-Routine (07.12.20 @ 07:03) >  FINDINGS/  IMPRESSION:   The cardiomediastinal silhouette is normal limits.  Pulmonary vascularity appears normal.  Left lung is clear.  Interval appearance of right basal haziness which may be due to atelectasis..  No significant pleural effusion. No pneumothorax.  No acute bony finding. SICU Daily Progress Note  =====================================================  Interval/Overnight Events:   - Lantus 5 given in addition to 16U due to elevated glucose 200s, Lantus 16 and Premeal 8  - Beasley reinserted, Flomax increased to 0.8  - Seroquel 25 overnight for sleep  - Tacro level 6.3, Tacro 3 and 2  - F/u abdominal U/S for graft patency  - Lasix 80 PO x2  - BCx x2 sent  - Haldol, 1:1 renewed    HPI:   64y Male c/b small esophageal varices (12/2019), portal hypertensive gastropathy, ascites, hx SBP, hx hepatic encephalopathy, GAVE syndrome s/p APC, hx duodenal ulcer (5/2019), HTN, HLD, DM, HFpEF (EF 70%), recent ESBL E coli prostate abscess (3/2020 s/p 4 weeks ertapenem), orthostatic hypotension ( on midodrine) who was initially admitted to medicine for hepatic encephalopathy. Hospital course c/b MISA, VRE UTI, acute blood loss anemia, received blood transfusion, EGD done 6/22 with Grade II esophageal varices, acute gastritis with hemorrhage and acute duodenitis with hemorrhage. Patient had worsening mental status, concerning decompensating liver cirrhosis, hepatic encephalopathy patient then transferred to SICU for further care. He is now s/p OLT complicated by delerium, bacteremia, hyper/hypoglycemia        Allergies: codeine (Anaphylaxis)      MEDICATIONS:   --------------------------------------------------------------------------------------  Neurologic Medications    Respiratory Medications    Cardiovascular Medications  tamsulosin 0.8 milliGRAM(s) Oral at bedtime    Gastrointestinal Medications  magnesium oxide 800 milliGRAM(s) Oral two times a day with meals  pantoprazole  Injectable 40 milliGRAM(s) IV Push every 12 hours    Genitourinary Medications    Hematologic/Oncologic Medications  aspirin enteric coated 81 milliGRAM(s) Oral daily  tacrolimus 3 milliGRAM(s) Oral <User Schedule>  tacrolimus 2 milliGRAM(s) Oral <User Schedule>    Antimicrobial/Immunologic Medications  linezolid  IVPB 600 milliGRAM(s) IV Intermittent every 12 hours  meropenem  IVPB 1000 milliGRAM(s) IV Intermittent every 8 hours  nystatin    Suspension 695024 Unit(s) Swish and Swallow four times a day  trimethoprim   80 mG/sulfamethoxazole 400 mG 1 Tablet(s) Oral daily  valGANciclovir 450 milliGRAM(s) Oral daily    Endocrine/Metabolic Medications  insulin glargine Injectable (LANTUS) 16 Unit(s) SubCutaneous at bedtime  insulin lispro (HumaLOG) corrective regimen sliding scale   SubCutaneous three times a day before meals  insulin lispro (HumaLOG) corrective regimen sliding scale   SubCutaneous at bedtime  insulin lispro Injectable (HumaLOG) 10 Unit(s) SubCutaneous before dinner  insulin lispro Injectable (HumaLOG) 8 Unit(s) SubCutaneous before breakfast  insulin lispro Injectable (HumaLOG) 8 Unit(s) SubCutaneous before lunch  predniSONE   Tablet 5 milliGRAM(s) Oral daily    Topical/Other Medications  chlorhexidine 2% Cloths 1 Application(s) Topical <User Schedule>  collagenase Ointment 1 Application(s) Topical daily  nystatin Cream 1 Application(s) Topical two times a day    --------------------------------------------------------------------------------------    VITAL SIGNS, INS/OUTS (last 24 hours):  --------------------------------------------------------------------------------------  ICU Vital Signs Last 24 Hrs  T(C): 36.8 (12 Jul 2020 23:00), Max: 36.8 (12 Jul 2020 23:00)  T(F): 98.2 (12 Jul 2020 23:00), Max: 98.2 (12 Jul 2020 23:00)  HR: 74 (13 Jul 2020 01:00) (72 - 81)  BP: 106/71 (13 Jul 2020 01:00) (94/55 - 154/68)  BP(mean): 83 (13 Jul 2020 01:00) (70 - 125)  ABP: --  ABP(mean): --  RR: 27 (13 Jul 2020 01:00) (12 - 28)  SpO2: 100% (13 Jul 2020 01:00) (91% - 100%)    --------------------------------------------------------------------------------------    EXAM  NEUROLOGY  Exam: Confused and dioriented, no focal deficits. persistent tremor, TORREZ spontaneously     HEENT  Exam: Normocephalic, atraumatic    RESPIRATORY  Exam: Lungs clear to auscultation, Normal expansion/effort.  Mechanical Ventilation: N/A    CARDIOVASCULAR  Exam: S1, S2.  Regular rate and rhythm.     GI/NUTRITION  Exam: Abdomen soft, Non-tender, Non-distended  Wound: c/d/i  Current Diet: Consistent carb diet    VASCULAR  Exam: Extremities warm, pink, well-perfused. Pitting edema in bilateral lower extremities     MUSCULOSKELETAL  Exam: All extremities moving spontaneously without limitations    SKIN  Exam: Good skin turgor, no skin breakdown.    METABOLIC/FLUIDS/ELECTROLYTES  magnesium oxide 800 milliGRAM(s) Oral two times a day with meals      HEMATOLOGIC  [x] VTE Prophylaxis: aspirin enteric coated 81 milliGRAM(s) Oral daily    Transfusions:	[] PRBC	[] Platelets		[] FFP	[] Cryoprecipitate    INFECTIOUS DISEASE  Antimicrobials/Immunologic Medications:  linezolid  IVPB 600 milliGRAM(s) IV Intermittent every 12 hours  meropenem  IVPB 1000 milliGRAM(s) IV Intermittent every 8 hours  nystatin    Suspension 216540 Unit(s) Swish and Swallow four times a day  tacrolimus 3 milliGRAM(s) Oral <User Schedule>  tacrolimus 2 milliGRAM(s) Oral <User Schedule>  trimethoprim   80 mG/sulfamethoxazole 400 mG 1 Tablet(s) Oral daily  valGANciclovir 450 milliGRAM(s) Oral daily      Tubes/Lines/Drains   [x] Peripheral IV  [] Central Venous Line     	[] R	[] L	[] IJ	[] Fem	[] SC	Date Placed:   [] Arterial Line		[] R	[] L	[] Fem	[] Rad	[] Ax	Date Placed:   [] PICC		[] Midline		[] Mediport  [x] Urinary Catheter		Date Placed:   [x] Necessity of urinary, arterial, and venous catheters discussed    LABS  --------------------------------------------------------------------------------------  07-12    131<L>  |  95<L>  |  31<H>  ----------------------------<  217<H>  5.0   |  27  |  1.26    Ca    8.1<L>      12 Jul 2020 12:22  Phos  4.0     07-12  Mg     2.0     07-12    TPro  4.4<L>  /  Alb  2.6<L>  /  TBili  1.1  /  DBili  0.5<H>  /  AST  64<H>  /  ALT  98<H>  /  AlkPhos  212<H>  07-12                          9.3    5.51  )-----------( 98       ( 12 Jul 2020 01:54 )             28.6     PT/INR - ( 12 Jul 2020 01:54 )   PT: 12.7 sec;   INR: 1.12 ratio         PTT - ( 12 Jul 2020 01:54 )  PTT:28.7 sec  --------------------------------------------------------------------------------------    OTHER LABORATORY:     IMAGING STUDIES:   CXR:   < from: Xray Chest 1 View- PORTABLE-Routine (07.12.20 @ 07:03) >  FINDINGS/  IMPRESSION:   The cardiomediastinal silhouette is normal limits.  Pulmonary vascularity appears normal.  Left lung is clear.  Interval appearance of right basal haziness which may be due to atelectasis..  No significant pleural effusion. No pneumothorax.  No acute bony finding.

## 2020-07-13 NOTE — PROGRESS NOTE ADULT - SUBJECTIVE AND OBJECTIVE BOX
INTERVAL HPI/OVERNIGHT EVENTS:    events noted  more alert today, agitation improved  still tremulous, but improved   hgb stable         MEDICATIONS  (STANDING):  aspirin enteric coated 81 milliGRAM(s) Oral daily  chlorhexidine 2% Cloths 1 Application(s) Topical <User Schedule>  collagenase Ointment 1 Application(s) Topical daily  furosemide    Tablet 80 milliGRAM(s) Oral every 8 hours  insulin glargine Injectable (LANTUS) 16 Unit(s) SubCutaneous at bedtime  insulin lispro (HumaLOG) corrective regimen sliding scale   SubCutaneous three times a day before meals  insulin lispro (HumaLOG) corrective regimen sliding scale   SubCutaneous at bedtime  insulin lispro Injectable (HumaLOG) 10 Unit(s) SubCutaneous before dinner  insulin lispro Injectable (HumaLOG) 8 Unit(s) SubCutaneous before breakfast  insulin lispro Injectable (HumaLOG) 8 Unit(s) SubCutaneous before lunch  linezolid  IVPB 600 milliGRAM(s) IV Intermittent every 12 hours  magnesium oxide 800 milliGRAM(s) Oral two times a day with meals  meropenem  IVPB 1000 milliGRAM(s) IV Intermittent every 8 hours  nystatin    Suspension 288748 Unit(s) Swish and Swallow four times a day  nystatin Cream 1 Application(s) Topical two times a day  pantoprazole  Injectable 40 milliGRAM(s) IV Push every 12 hours  polyethylene glycol 3350 17 Gram(s) Oral daily  predniSONE   Tablet 5 milliGRAM(s) Oral daily  senna 2 Tablet(s) Oral at bedtime  tacrolimus 3 milliGRAM(s) Oral <User Schedule>  tacrolimus 2 milliGRAM(s) Oral <User Schedule>  tamsulosin 0.8 milliGRAM(s) Oral at bedtime  trimethoprim   80 mG/sulfamethoxazole 400 mG 1 Tablet(s) Oral daily  valGANciclovir 450 milliGRAM(s) Oral daily    MEDICATIONS  (PRN):  glucagon  Injectable 1 milliGRAM(s) IntraMuscular once PRN Glucose <70 milliGRAM(s)/deciLiter      Allergies    codeine (Anaphylaxis)    Intolerances        Review of Systems:    General:  No wt loss, fevers, chills, night sweats,fatigue,   Eyes:  Good vision, no reported pain  ENT:  No sore throat, pain, runny nose, dysphagia  CV:  No pain, palpitatioins, hypo/hypertension  Resp:  No dyspnea, cough, tachypnea, wheezing  GI:  No pain, No nausea, No vomiting, No diarrhea, No constipatiion, No weight loss, No fever, No pruritis, No rectal bleeding, No tarry stools, No dysphagia,  :  No pain, bleeding, incontinence, nocturia  Muscle:  No pain, weakness  Neuro:  No weakness, tingling, memory problems  Psych:  No fatigue, insomnia, mood problems, depression  Endocrine:  No polyuria, polydypsia, cold/heat intolerance  Heme:  No petechiae, ecchymosis, easy bruisability  Skin:  No rash, tattoos, scars, edema      Vital Signs Last 24 Hrs  T(C): 36.3 (12 Jul 2020 15:00), Max: 36.8 (11 Jul 2020 19:00)  T(F): 97.3 (12 Jul 2020 15:00), Max: 98.2 (11 Jul 2020 19:00)  HR: 80 (12 Jul 2020 15:00) (72 - 83)  BP: 109/59 (12 Jul 2020 15:00) (94/55 - 154/68)  BP(mean): 78 (12 Jul 2020 15:00) (70 - 125)  RR: 15 (12 Jul 2020 15:00) (9 - 28)  SpO2: 96% (12 Jul 2020 15:00) (96% - 100%)    PHYSICAL EXAM:    Constitutional: NAD  HEENT: sclera anicteric   Neck: No LAD, supple  Gastrointestinal: BS+, soft, NT/ND, neg HSM,  Extremities: +b/l peripheral edema  Neurological: awake, more alert, confused, +tremors     LABS:                        9.3    5.51  )-----------( 98       ( 12 Jul 2020 01:54 )             28.6     07-12    131<L>  |  95<L>  |  31<H>  ----------------------------<  217<H>  5.0   |  27  |  1.26    Ca    8.1<L>      12 Jul 2020 12:22  Phos  4.0     07-12  Mg     2.0     07-12    TPro  4.4<L>  /  Alb  2.6<L>  /  TBili  1.1  /  DBili  0.5<H>  /  AST  64<H>  /  ALT  98<H>  /  AlkPhos  212<H>  07-12    PT/INR - ( 12 Jul 2020 01:54 )   PT: 12.7 sec;   INR: 1.12 ratio         PTT - ( 12 Jul 2020 01:54 )  PTT:28.7 sec      RADIOLOGY & ADDITIONAL TESTS:

## 2020-07-13 NOTE — PROGRESS NOTE ADULT - ASSESSMENT
63 y/o M PMHx decompensated JEAN Cirrhosis on transplant list, DMII, HFpEF, recent admission for ESBL E coli prostatic abscess 2/2 4 wks ertapenem, hx of ESBL UTIs, recent VRE urinary colonization came to hospital for worsening jaundice and episode of confusion, resolved PTA. s/p treatment course for possible VRE UTI. Now s/p OLT on 7/2/20    Noted to have some drainage from right heel so danilo-op Dapto and Lachelle were extended  No drainage, erythema or underlying fluctuance was appreciated at the right heel  MRI prior to transplant without obvious infection    Now with worsening encephalopathy  Also with new hypoxia on 7/8>7/9  U/A without pyuria  COVID19 PCR Negative  CXR with RLL Infiltrate  ?Aspiration PNA    Blood cultures (7/10) with VRE in 1/2 Sets  CT Chest (7/11) without evidence of pneumonia (reviewed with CT Radiologist today, 7/13)  CT A/P (7/11) with no obvious intraabdominal pathology but with Partially imaged fluid collection in the left groin with overlying skin staples.    Favor targeted ultrasound and possible drainage of collection (if present) of L groin    RECOMMENDATIONS    #VRE Bacteremia, L Groin Collection, Encephalopathy  --Recommend US of left groin  --Continue Linezolid 600 mg IV Q12H  --Continue to follow CBC with diff  --Continue to follow temperature curve  --Follow up on preliminary blood cultures  --Continue monitoring tacrolimus levels     #Hypoxic Respiratory Failure, Encephalopathy  --Continue meropenem for now - pending US evaluation of left groin collection.   --No evidence of pneumonia so if collection not concerning for infection i.e. if seroma would discontinue the meropenem.    #s/p OLT   --Continue bactrim, valcyte for prophylaxis   --Monitor patient for tremors - monitor tacrolimus dose   --Removal of drains and lines as per surgical transplant team    #Right heel wound  --Podiatry on board    --Local wound care     I will continue to follow. Please feel free to contact me with any further questions.    Eusebio Pérez M.D.  [facility] Division of Infectious Disease  8AM-5PM: Pager Number [phone]  After Hours (or if no response): Please contact the Infectious Diseases Office at [phone]     The above assessment and plan were discussed with Dr Chris, Dr Garcia, Xiao - Transplant Surgery NP

## 2020-07-13 NOTE — PROGRESS NOTE ADULT - PROBLEM SELECTOR PLAN 3
-defer statin in the setting of recent liver x-plant  Liz Holley MD  Pager: [phone], between 9am-6pm  Nights and Weekends: [phone]

## 2020-07-13 NOTE — PROGRESS NOTE ADULT - ASSESSMENT
ASSESSMENT:  64y Male c/b small esophageal varices (12/2019), portal hypertensive gastropathy, ascites, hx SBP, hx hepatic encephalopathy, GAVE syndrome s/p APC, hx duodenal ulcer (5/2019), HTN, HLD, DM, HFpEF (EF 70%), recent ESBL E coli prostate abscess (3/2020 s/p 4 weeks ertapenem), orthostatic hypotension ( on midodrine) who was initially admitted to medicine for hepatic encephalopathy. Hospital course c/b MISA, VRE UTI, acute blood loss anemia, received blood transfusion, EGD done 6/22 with Grade II esophageal varices, acute gastritis with hemorrhage and acute duodenitis with hemorrhage. Patient had worsening mental status, concerning decompensating liver cirrhosis, hepatic encephalopathy patient then transferred to SICU for further care. He is now s/p OLT complicated by delerium, bacteremia, hyper/hypoglycemia    PLAN:   Neurologic: tremors, hallucinations, confusion 2/2 ?tacro toxicity  - Neuro status waxing and waning  - haldol prn   - Promote sleep hygiene nocturnal CPAP ordered to improve sleep and prevent sleep apnea     Respiratory: RLL infiltrate developing  - IS, OOBTC as tolerated  - MRSA swab negative, MSSA positive  - f/u AM CXR   - Continuous pulse oximetry    Cardiovascular: Intermittent hypertension  - Nifedipine 30mg currently being held  - Labetalol PRN for SBP goal <145  - ASA  - Monitor hemodynamic status    Gastrointestinal/Nutrition: GIB, JEAN cirrhosis, GAVE syndrome. S/p liver transplant   - Regular, consistent carb diet. Tolerating well.  - Protonix 40mg BID  - Trend LFTs q12    Renal: urinary retention, hyponatremia  - C/w flomax for urinary retention, increased to 0.8  - Trend BMPs, replete lytes prn  - Beasley in place  - Lasix prn     Heme: coagulopathy  - Venodynes for mechanical VTE prophylaxis, holding chemical VTE prophylaxis in the setting of coagulopathy/GIB.  - Trend H/H, coags    Infectious Disease: immunosuppresion; possible RLL PNA  - VRE on 7/10 bcx   - F/u on final bcx (E. feacalis). repeat bcx today   - Conitnue meropenem, linezolid   - Continue PPx as per transplant Bactrim, valcyte  - Continue immunosuppression as per transplant solumedrol, tacro. Cellcept held yesterday.   - CT C/A/P--> trace pleural effusion, groin hematoma    Endo: DM type II, HLD  - ISS, lantus 16u qhs, 8u premeal/qhs insulin  - Monitor FS premeal and qhs    Disposition:  - SICU full code

## 2020-07-13 NOTE — PROGRESS NOTE ADULT - SUBJECTIVE AND OBJECTIVE BOX
Transplant Surgery Progress Note    Transplant Surgery - Multidisciplinary Rounds  --------------------------------------------------------------  OLTx      Date:  7/2/2020       POD#11    Present: Patient seen with multidisciplinary team including Transplant Surgeon: Dr. Garcia, Dr. Gray, Dr. Chris, Transplant Nephrologist Dr. Salvador, OLY Bingham, NP aXvi during am rounds and examined with Dr. Garcia. Disciplines not in attendance will be notified of the plan.     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     Re-admitted on 6/11:   ·	worsening HE  ·	UTI/VRE: tx with linezolid 6/15-22, bactrim DS 6/22 - 7/2  ·	MISA/Hyponatremia  ·	UGI bleed (melena) s/p EGD (6/22) c/w hemorrhagic duodenitis/gastritis; Grade 2 EV; requiring multiple transfusions  ·	Known h/o R foot ulcer (MRI neg for OM)  ·	s/p OLTx on 7/2/2020, post op liver doppler with patent vessels  ·	post op course c/b delirium and VRE bacteremia (7/10)     Interval events:  - POD 11 s/p OLT with good graft function, mild uptrend in LFTs: Alk Phos 240 (212), AST 71 (64),  (98), TBili 1.1  - Overnight confused/agitated; received Seroquel and Haldol x 1; remains on 1:1; This am: confused, AO x 2, following commands; Tremors better  - Afebrile, remains on mari/linezolid for VRE from 7/10; repeat bld cx on 7/12 pending    - Drains: Bob reinserted yesterday for elevated PVR, increased flomax 0.8mg; ANDREINA x 1 with SS drainage (490cc)  - Stable renal function, SrCr 1.1, good u/o 5L v    MEDICATIONS  (STANDING):  aspirin enteric coated 81 milliGRAM(s) Oral daily  chlorhexidine 2% Cloths 1 Application(s) Topical <User Schedule>  collagenase Ointment 1 Application(s) Topical daily  furosemide    Tablet 80 milliGRAM(s) Oral every 12 hours  insulin glargine Injectable (LANTUS) 16 Unit(s) SubCutaneous at bedtime  insulin lispro (HumaLOG) corrective regimen sliding scale   SubCutaneous three times a day before meals  insulin lispro (HumaLOG) corrective regimen sliding scale   SubCutaneous at bedtime  insulin lispro Injectable (HumaLOG) 10 Unit(s) SubCutaneous before dinner  insulin lispro Injectable (HumaLOG) 8 Unit(s) SubCutaneous before breakfast  insulin lispro Injectable (HumaLOG) 8 Unit(s) SubCutaneous before lunch  linezolid  IVPB 600 milliGRAM(s) IV Intermittent every 12 hours  magnesium oxide 800 milliGRAM(s) Oral two times a day with meals  meropenem  IVPB 1000 milliGRAM(s) IV Intermittent every 8 hours  nystatin    Suspension 098057 Unit(s) Swish and Swallow four times a day  nystatin Cream 1 Application(s) Topical two times a day  pantoprazole  Injectable 40 milliGRAM(s) IV Push every 12 hours  predniSONE   Tablet 5 milliGRAM(s) Oral daily  tacrolimus 3 milliGRAM(s) Oral <User Schedule>  tacrolimus 2 milliGRAM(s) Oral <User Schedule>  tamsulosin 0.8 milliGRAM(s) Oral at bedtime  trimethoprim   80 mG/sulfamethoxazole 400 mG 1 Tablet(s) Oral daily  ursodiol Capsule 300 milliGRAM(s) Oral two times a day  valGANciclovir 450 milliGRAM(s) Oral daily    MEDICATIONS  (PRN):      PAST MEDICAL & SURGICAL HISTORY:  GIB (gastrointestinal bleeding)  GERD with esophagitis: Gastritis &amp; Non Bleeding Ulcers  Hepatic encephalopathy  Obesity  Fatty liver disease, nonalcoholic  Renal stones: 25 years ago  Hypertension  Neuropathy  Hypercholesteremia  Diabetes  S/P cholecystectomy    Vital Signs Last 24 Hrs  T(C): 36.8 (13 Jul 2020 07:00), Max: 36.8 (12 Jul 2020 23:00)  T(F): 98.2 (13 Jul 2020 07:00), Max: 98.2 (12 Jul 2020 23:00)  HR: 78 (13 Jul 2020 10:00) (70 - 81)  BP: 139/65 (13 Jul 2020 10:00) (97/54 - 154/68)  BP(mean): 93 (13 Jul 2020 10:00) (72 - 125)  RR: 32 (13 Jul 2020 10:00) (12 - 32)  SpO2: 98% (13 Jul 2020 10:00) (91% - 100%)    I&O's Summary    12 Jul 2020 07:01  -  13 Jul 2020 07:00  --------------------------------------------------------  IN: 2497 mL / OUT: 5655 mL / NET: -3158 mL    13 Jul 2020 07:01  -  13 Jul 2020 10:32  --------------------------------------------------------  IN: 350 mL / OUT: 125 mL / NET: 225 mL                        9.4    4.58  )-----------( 114      ( 13 Jul 2020 05:25 )             28.1     07-13    135  |  98  |  28<H>  ----------------------------<  121<H>  4.3   |  30  |  1.19    Ca    8.5      13 Jul 2020 05:25  Phos  3.0     07-13  Mg     1.7     07-13    TPro  4.6<L>  /  Alb  2.6<L>  /  TBili  1.3<H>  /  DBili  0.6<H>  /  AST  71<H>  /  ALT  119<H>  /  AlkPhos  240<H>  07-13    Tacrolimus (), Serum: 6.2 ng/mL (07-13 @ 08:39)    cxs:   7/11 urine cx - NG  7/10 bld NGTD  7/10 bld - VRE      Review of systems  Gen: confused  Skin: No rashes  Head/Eyes/Ears/Mouth: No headache; Normal hearing; Normal vision w/o blurriness; No sinus pain/discomfort, sore throat  Respiratory: No dyspnea, cough, wheezing, hemoptysis  CV: No chest pain, PND, orthopnea  GI: Mild abdominal pain at surgical incision site; denies diarrhea, constipation, nausea, vomiting, melena, hematochezia  : No increased frequency, dysuria, hematuria, nocturia  MSK: No joint pain/swelling; no back pain; no edema  Neuro: No dizziness/lightheadedness, weakness, seizures, numbness, tingling  Psych: No significant nervousness, anxiety, stress, depression  All other systems were reviewed and are negative, except as noted.      Physical exam:  Constitutional: NAD  Eyes: ROHAN  ENMT: NC/AT  Neck: supple  Respiratory: CTA b/l  Cardiovascular: RRR  Gastrointestinal: Soft abdomen, ND, mild incisional tenderness, incision intact with staples, T-tube clamped  ANDREINA SS output  Genitourinary: bob   Extremities: SCD's in place and working bilaterally. L groin cutdown site c/d/i  Vascular: Palpable dp pulses bilaterally  Neurological: AO x 2; upper body tremors still present, improved   Skin: no rashes, ulcerations,  Psychiatric: responsive

## 2020-07-13 NOTE — PROGRESS NOTE ADULT - PROBLEM SELECTOR PLAN 1
-test BG AC/HS  -Continue Lantus 16 units SQ QHS  -Agree with primary team: decrease huamlog to 5 units sq tid-ac  -continue Humalog low correction scale AC and low HS scale  DISPO: basal/bolus, with doses TBD  -Pt can resume f/u with Dr. Mathur or   [facility] [address] [facility] [address] [place] [phone].

## 2020-07-13 NOTE — PROGRESS NOTE ADULT - SUBJECTIVE AND OBJECTIVE BOX
Chief Complaint:  Patient is a 64y old  Male who presents with a chief complaint of Liver failure, hepatic encephalopathy (2020 01:26)      Interval Events: Pt agitation improved on seroquel overnight. Tremors also improved. No abdominal pain, n/v.    Allergies:  codeine (Anaphylaxis)      Hospital Medications:  aspirin enteric coated 81 milliGRAM(s) Oral daily  chlorhexidine 2% Cloths 1 Application(s) Topical <User Schedule>  collagenase Ointment 1 Application(s) Topical daily  furosemide    Tablet 80 milliGRAM(s) Oral every 12 hours  insulin glargine Injectable (LANTUS) 16 Unit(s) SubCutaneous at bedtime  insulin lispro (HumaLOG) corrective regimen sliding scale   SubCutaneous three times a day before meals  insulin lispro (HumaLOG) corrective regimen sliding scale   SubCutaneous at bedtime  insulin lispro Injectable (HumaLOG) 10 Unit(s) SubCutaneous before dinner  insulin lispro Injectable (HumaLOG) 8 Unit(s) SubCutaneous before breakfast  insulin lispro Injectable (HumaLOG) 8 Unit(s) SubCutaneous before lunch  linezolid  IVPB 600 milliGRAM(s) IV Intermittent every 12 hours  magnesium oxide 800 milliGRAM(s) Oral two times a day with meals  meropenem  IVPB 1000 milliGRAM(s) IV Intermittent every 8 hours  nystatin    Suspension [id number] Unit(s) Swish and Swallow four times a day  nystatin Cream 1 Application(s) Topical two times a day  pantoprazole  Injectable 40 milliGRAM(s) IV Push every 12 hours  predniSONE   Tablet 5 milliGRAM(s) Oral daily  tacrolimus 3 milliGRAM(s) Oral <User Schedule>  tacrolimus 2 milliGRAM(s) Oral <User Schedule>  tamsulosin 0.8 milliGRAM(s) Oral at bedtime  trimethoprim   80 mG/sulfamethoxazole 400 mG 1 Tablet(s) Oral daily  ursodiol Capsule 300 milliGRAM(s) Oral two times a day  valGANciclovir 450 milliGRAM(s) Oral daily      PMHX/PSHX:  GIB (gastrointestinal bleeding)  GERD with esophagitis  Hepatic encephalopathy  Obesity  Fatty liver disease, nonalcoholic  Renal stones  Hypertension  Neuropathy  Hypercholesteremia  Diabetes  S/P cholecystectomy  No significant past surgical history      Family history:  Family history of type 2 diabetes mellitus  Family history of hypertension  Family history of stomach cancer  No pertinent family history in first degree relatives      ROS: As per HPI, 14-point ROS negative otherwise.      PHYSICAL EXAM:     Vital Signs:  Vital Signs Last 24 Hrs  T(C): 36.8 (2020 07:00), Max: 36.8 (2020 23:00)  T(F): 98.2 (2020 07:00), Max: 98.2 (2020 23:00)  HR: 78 (2020 10:00) (70 - 81)  BP: 139/65 (2020 10:00) (97/54 - 154/68)  BP(mean): 93 (2020 10:00) (72 - 125)  RR: 32 (2020 10:00) (12 - 32)  SpO2: 98% (2020 10:00) (91% - 100%)  Daily     Daily Weight in k.9 (2020 02:00)    GENERAL: no acute distress  NEURO: alert, AAOx2, +tremors, answers questions and follows commands  HEENT: anicteric sclera, no conjunctival pallor appreciated  CHEST: no respiratory distress, no accessory muscle use  CARDIAC: regular rate, rhythm  ABDOMEN: soft, incisions intact, ANDREINA drains in place w/ serosanguinous drainage  EXTREMITIES: warm, well perfused, trace/1+ BLLE edema  SKIN: R heel with small open wound with overlying dressing, no purulence    LABS:                        9.4    4.58  )-----------( 114      ( 2020 05:25 )             28.1     07-13    135  |  98  |  28<H>  ----------------------------<  121<H>  4.3   |  30  |  1.19    Ca    8.5      2020 05:25  Phos  3.0     07-13  Mg     1.7     07-13    TPro  4.6<L>  /  Alb  2.6<L>  /  TBili  1.3<H>  /  DBili  0.6<H>  /  AST  71<H>  /  ALT  119<H>  /  AlkPhos  240<H>  07-13    LIVER FUNCTIONS - ( 2020 05:25 )  Alb: 2.6 g/dL / Pro: 4.6 g/dL / ALK PHOS: 240 U/L / ALT: 119 U/L / AST: 71 U/L / GGT: x           PT/INR - ( 2020 05:25 )   PT: 13.1 sec;   INR: 1.15 ratio         PTT - ( 2020 05:25 )  PTT:28.9 sec    Amylase Serum--      Lipase serum--       [id number]      Imaging:  < from: CT Abdomen and Pelvis w/ IV Cont (20 @ 16:57) >    IMPRESSION:   Small bilateral pleural effusions, right greater than left, with associated compressive atelectasis.    Status post liver transplant.     Small volume ascites.    Varices again noted.    Nonobstructing intrarenal calculi.      < end of copied text

## 2020-07-13 NOTE — PROGRESS NOTE ADULT - ATTENDING COMMENTS
POD#11 s/p OLT, complicated by steroid-induced psychosis with hallucinations and delirium, tacrolimus neurotoxicity with tremors, and now VRE bacteremia  Cont linezolid. Mental status significantly improved  No longer having hallucinations. seroquel qhs prn   Cont current immunosuppression with Prograf 3mg bid, Prednisone 5 mg daily, Cellcept held.   Will check tacrolimus level and adjust dose accordingly.  Transplant Prophylaxis with nystatin, bactrim, valcyte  PPI due to steroids  Abx as per ID for pneumonia and VRE bacteremia.   cont lasix bid for LE edema

## 2020-07-13 NOTE — PROGRESS NOTE ADULT - ASSESSMENT
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    [] POD 11 s/p OLT   - Good graft function, mild uptrend in LFTS, start Lester 300mg bid  - Liver doppler (7/12) with patent vessels (prelim), f/u final report  - Immuno: FK level 6.2 Increase FK 3mg bid, MMF on hold in setting of infection, Pred 5mg daily  - PPX: valcyte/nystatin/bactrim  - Continue ASA 81mg daily  - LE edema: Lasix 80mg PO BID   - h/o UGI bleed: Continue Protonix 40mg IV bid, stable H/H    - Cont Mag 800mg BID, keep Mag >2  - Regular, carbohydrate restricted diet  - Pain control PRN  - DVT PPx: SCDs at all times    [] VRE bacteremia (7/10)  - ID following: on meropenem/linezolid; plan to dc meropenem today  - f/u repeat bld cxs from 7/12   - CT c/a/p (7/11) showed L groin fluid collection and b/l small pleural effusions.     [] Hyperglycemia  - Appreciate endocrinology recommendations  - Cont Lantus and Humalog insulin wih SSI coverage  - Fingersticks ac and qhs    [] Heel ulcer  Continue local wound care w/ santyl and DSD as per podiatry    Continue ICU care

## 2020-07-14 DIAGNOSIS — R78.81 BACTEREMIA: ICD-10-CM

## 2020-07-14 LAB
ALBUMIN SERPL ELPH-MCNC: 2.9 G/DL — LOW (ref 3.3–5)
ALP SERPL-CCNC: 199 U/L — HIGH (ref 40–120)
ALT FLD-CCNC: 90 U/L — HIGH (ref 10–45)
AMMONIA BLD-MCNC: 33 UMOL/L — SIGNIFICANT CHANGE UP (ref 11–55)
ANION GAP SERPL CALC-SCNC: 8 MMOL/L — SIGNIFICANT CHANGE UP (ref 5–17)
APTT BLD: 28.5 SEC — SIGNIFICANT CHANGE UP (ref 27.5–35.5)
AST SERPL-CCNC: 36 U/L — SIGNIFICANT CHANGE UP (ref 10–40)
BILIRUB DIRECT SERPL-MCNC: 0.6 MG/DL — HIGH (ref 0–0.2)
BILIRUB INDIRECT FLD-MCNC: 0.6 MG/DL — SIGNIFICANT CHANGE UP (ref 0.2–1)
BILIRUB SERPL-MCNC: 1.2 MG/DL — SIGNIFICANT CHANGE UP (ref 0.2–1.2)
BUN SERPL-MCNC: 27 MG/DL — HIGH (ref 7–23)
CALCIUM SERPL-MCNC: 8.8 MG/DL — SIGNIFICANT CHANGE UP (ref 8.4–10.5)
CHLORIDE SERPL-SCNC: 99 MMOL/L — SIGNIFICANT CHANGE UP (ref 96–108)
CO2 SERPL-SCNC: 31 MMOL/L — SIGNIFICANT CHANGE UP (ref 22–31)
CREAT SERPL-MCNC: 1.26 MG/DL — SIGNIFICANT CHANGE UP (ref 0.5–1.3)
GLUCOSE BLDC GLUCOMTR-MCNC: 197 MG/DL — HIGH (ref 70–99)
GLUCOSE BLDC GLUCOMTR-MCNC: 245 MG/DL — HIGH (ref 70–99)
GLUCOSE BLDC GLUCOMTR-MCNC: 279 MG/DL — HIGH (ref 70–99)
GLUCOSE BLDC GLUCOMTR-MCNC: 90 MG/DL — SIGNIFICANT CHANGE UP (ref 70–99)
GLUCOSE SERPL-MCNC: 76 MG/DL — SIGNIFICANT CHANGE UP (ref 70–99)
GRAM STN FLD: SIGNIFICANT CHANGE UP
HCT VFR BLD CALC: 31.3 % — LOW (ref 39–50)
HGB BLD-MCNC: 10.2 G/DL — LOW (ref 13–17)
INR BLD: 0.99 RATIO — SIGNIFICANT CHANGE UP (ref 0.88–1.16)
MAGNESIUM SERPL-MCNC: 2 MG/DL — SIGNIFICANT CHANGE UP (ref 1.6–2.6)
MCHC RBC-ENTMCNC: 31.9 PG — SIGNIFICANT CHANGE UP (ref 27–34)
MCHC RBC-ENTMCNC: 32.6 GM/DL — SIGNIFICANT CHANGE UP (ref 32–36)
MCV RBC AUTO: 97.8 FL — SIGNIFICANT CHANGE UP (ref 80–100)
NRBC # BLD: 0 /100 WBCS — SIGNIFICANT CHANGE UP (ref 0–0)
PHOSPHATE SERPL-MCNC: 3.8 MG/DL — SIGNIFICANT CHANGE UP (ref 2.5–4.5)
PLATELET # BLD AUTO: 136 K/UL — LOW (ref 150–400)
POTASSIUM SERPL-MCNC: 4.4 MMOL/L — SIGNIFICANT CHANGE UP (ref 3.5–5.3)
POTASSIUM SERPL-SCNC: 4.4 MMOL/L — SIGNIFICANT CHANGE UP (ref 3.5–5.3)
PROCALCITONIN SERPL-MCNC: 0.68 NG/ML — HIGH (ref 0.02–0.1)
PROT SERPL-MCNC: 5.3 G/DL — LOW (ref 6–8.3)
PROTHROM AB SERPL-ACNC: 11.8 SEC — SIGNIFICANT CHANGE UP (ref 10.6–13.6)
RBC # BLD: 3.2 M/UL — LOW (ref 4.2–5.8)
RBC # FLD: 19.1 % — HIGH (ref 10.3–14.5)
SODIUM SERPL-SCNC: 138 MMOL/L — SIGNIFICANT CHANGE UP (ref 135–145)
SPECIMEN SOURCE: SIGNIFICANT CHANGE UP
TACROLIMUS SERPL-MCNC: 9.9 NG/ML — SIGNIFICANT CHANGE UP
WBC # BLD: 4.68 K/UL — SIGNIFICANT CHANGE UP (ref 3.8–10.5)
WBC # FLD AUTO: 4.68 K/UL — SIGNIFICANT CHANGE UP (ref 3.8–10.5)

## 2020-07-14 PROCEDURE — 99233 SBSQ HOSP IP/OBS HIGH 50: CPT

## 2020-07-14 PROCEDURE — 99232 SBSQ HOSP IP/OBS MODERATE 35: CPT

## 2020-07-14 PROCEDURE — 99232 SBSQ HOSP IP/OBS MODERATE 35: CPT | Mod: GC

## 2020-07-14 RX ORDER — INSULIN LISPRO 100/ML
VIAL (ML) SUBCUTANEOUS
Refills: 0 | Status: DISCONTINUED | OUTPATIENT
Start: 2020-07-14 | End: 2020-07-19

## 2020-07-14 RX ORDER — INSULIN GLARGINE 100 [IU]/ML
13 INJECTION, SOLUTION SUBCUTANEOUS AT BEDTIME
Refills: 0 | Status: DISCONTINUED | OUTPATIENT
Start: 2020-07-14 | End: 2020-07-15

## 2020-07-14 RX ORDER — MIRTAZAPINE 45 MG/1
30 TABLET, ORALLY DISINTEGRATING ORAL ONCE
Refills: 0 | Status: COMPLETED | OUTPATIENT
Start: 2020-07-14 | End: 2020-07-14

## 2020-07-14 RX ORDER — DEXMEDETOMIDINE HYDROCHLORIDE IN 0.9% SODIUM CHLORIDE 4 UG/ML
0.5 INJECTION INTRAVENOUS
Qty: 200 | Refills: 0 | Status: DISCONTINUED | OUTPATIENT
Start: 2020-07-14 | End: 2020-07-15

## 2020-07-14 RX ORDER — PANTOPRAZOLE SODIUM 20 MG/1
40 TABLET, DELAYED RELEASE ORAL
Refills: 0 | Status: DISCONTINUED | OUTPATIENT
Start: 2020-07-14 | End: 2020-08-11

## 2020-07-14 RX ADMIN — TACROLIMUS 3 MILLIGRAM(S): 5 CAPSULE ORAL at 07:55

## 2020-07-14 RX ADMIN — MIRTAZAPINE 30 MILLIGRAM(S): 45 TABLET, ORALLY DISINTEGRATING ORAL at 21:23

## 2020-07-14 RX ADMIN — Medication 1 APPLICATION(S): at 11:32

## 2020-07-14 RX ADMIN — PANTOPRAZOLE SODIUM 40 MILLIGRAM(S): 20 TABLET, DELAYED RELEASE ORAL at 11:31

## 2020-07-14 RX ADMIN — CHLORHEXIDINE GLUCONATE 1 APPLICATION(S): 213 SOLUTION TOPICAL at 06:15

## 2020-07-14 RX ADMIN — NYSTATIN CREAM 1 APPLICATION(S): 100000 CREAM TOPICAL at 06:15

## 2020-07-14 RX ADMIN — NYSTATIN CREAM 1 APPLICATION(S): 100000 CREAM TOPICAL at 17:29

## 2020-07-14 RX ADMIN — Medication 100000 UNIT(S): at 17:25

## 2020-07-14 RX ADMIN — VALGANCICLOVIR 450 MILLIGRAM(S): 450 TABLET, FILM COATED ORAL at 11:33

## 2020-07-14 RX ADMIN — Medication 81 MILLIGRAM(S): at 11:33

## 2020-07-14 RX ADMIN — TAMSULOSIN HYDROCHLORIDE 0.8 MILLIGRAM(S): 0.4 CAPSULE ORAL at 21:21

## 2020-07-14 RX ADMIN — Medication 1 TABLET(S): at 11:33

## 2020-07-14 RX ADMIN — TACROLIMUS 3 MILLIGRAM(S): 5 CAPSULE ORAL at 20:10

## 2020-07-14 RX ADMIN — INSULIN GLARGINE 13 UNIT(S): 100 INJECTION, SOLUTION SUBCUTANEOUS at 21:22

## 2020-07-14 RX ADMIN — DEXMEDETOMIDINE HYDROCHLORIDE IN 0.9% SODIUM CHLORIDE 12.9 MICROGRAM(S)/KG/HR: 4 INJECTION INTRAVENOUS at 21:22

## 2020-07-14 RX ADMIN — URSODIOL 300 MILLIGRAM(S): 250 TABLET, FILM COATED ORAL at 18:46

## 2020-07-14 RX ADMIN — PANTOPRAZOLE SODIUM 40 MILLIGRAM(S): 20 TABLET, DELAYED RELEASE ORAL at 06:15

## 2020-07-14 RX ADMIN — Medication 100000 UNIT(S): at 11:32

## 2020-07-14 RX ADMIN — URSODIOL 300 MILLIGRAM(S): 250 TABLET, FILM COATED ORAL at 06:15

## 2020-07-14 RX ADMIN — Medication 4: at 16:08

## 2020-07-14 RX ADMIN — Medication 100000 UNIT(S): at 06:15

## 2020-07-14 NOTE — PROGRESS NOTE ADULT - SUBJECTIVE AND OBJECTIVE BOX
HISTORY  64y Male c/b small esophageal varices (12/2019), portal hypertensive gastropathy, ascites, hx SBP, hx hepatic encephalopathy, GAVE syndrome s/p APC, hx duodenal ulcer (5/2019), HTN, HLD, DM, HFpEF (EF 70%), recent ESBL E coli prostate abscess (3/2020 s/p 4 weeks ertapenem), orthostatic hypotension ( on midodrine) who was initially admitted to medicine for hepatic encephalopathy. Hospital course c/b MISA, VRE UTI, acute blood loss anemia, received blood transfusion, EGD done 6/22 with Grade II esophageal varices, acute gastritis with hemorrhage and acute duodenitis with hemorrhage. Patient had worsening mental status, concerning decompensating liver cirrhosis, hepatic encephalopathy patient then transferred to SICU for further care. He is now s/p OLT complicated by delerium, bacteremia, hyper/hypoglycemia.    24 HOUR EVENTS:  - Pt more delirious, has not slept in >24hrs. Pt given 50mg Seroquel x1 during day, and 15mg Remeron at bedtime to promote sleep.  - Precedex gtt started for increasing delirium  - Lantus 5u given in addition to 16u (7/12) due to elevated glucose to the 200s, pt hypoglycemic to 60s (7/13). Premeal insulin decreased from 8u to 5u. Currently receiving Lantus 16u daily and 5u premeal.,  - Tacro dosed by level, 3mg x2 (7/13)  - Started ursodiol 300mg BID  - Diuresed with Lasix 80mg PO x1  - L groin seroma aspirated, specimen sent for culture      NEURO  Exam: AOx2, confused. +hallucinations, +tremors  Meds: dexMEDEtomidine Infusion 0.5 MICROgram(s)/kG/Hr IV Continuous <Continuous>  [x] Adequacy of sedation and pain control has been assessed and adjusted      RESPIRATORY  RR: 16 (07-14-20 @ 00:00) (12 - 32)  SpO2: 98% (07-14-20 @ 00:00) (94% - 100%)  Exam: unlabored  Meds:       CARDIOVASCULAR  HR: 68 (07-14-20 @ 00:00) (68 - 80)  BP: 103/59 (07-14-20 @ 00:00) (97/54 - 184/123)  BP(mean): 76 (07-14-20 @ 00:00) (70 - 147)  Exam: RRR  Cardiac Rhythm: normal sinus  Perfusion     [x]Adequate   [ ]Inadequate  Mentation   [ ]Normal       [x]Reduced  Extremities  [x]Warm         [ ]Cool  Volume Status [ ]Hypervolemic [x]Euvolemic [ ]Hypovolemic  Meds: tamsulosin 0.8 milliGRAM(s) Oral at bedtime      GI/NUTRITION  Exam: soft, non-tender, non-distended, incisions intact, ANDREINA drains with serosanguinous drainage  Diet: regular, consistent carb  Meds: pantoprazole  Injectable 40 milliGRAM(s) IV Push every 12 hours  ursodiol Capsule 300 milliGRAM(s) Oral two times a day      GENITOURINARY  I&O's Detail    07-12 @ 07:01  -  07-13 @ 07:00  --------------------------------------------------------  IN:    Oral Fluid: 1497 mL    Solution: 850 mL    Solution: 150 mL  Total IN: 2497 mL    OUT:    Drain: 490 mL    Indwelling Catheter - Urethral: 5165 mL  Total OUT: 5655 mL    Total NET: -3158 mL    07-13 @ 07:01  -  07-14 @ 00:23  --------------------------------------------------------  IN:    dexmedetomidine Infusion: 25.8 mL    Oral Fluid: 708 mL    Solution: 350 mL    Solution: 400 mL  Total IN: 1483.8 mL    OUT:    Drain: 100 mL    Indwelling Catheter - Urethral: 3640 mL  Total OUT: 3740 mL    Total NET: -2256.2 mL      07-13    135  |  98  |  28<H>  ----------------------------<  121<H>  4.3   |  30  |  1.19    Ca    8.5      13 Jul 2020 05:25  Phos  3.0     07-13  Mg     1.7     07-13    TPro  4.6<L>  /  Alb  2.6<L>  /  TBili  1.3<H>  /  DBili  0.6<H>  /  AST  71<H>  /  ALT  119<H>  /  AlkPhos  240<H>  07-13    [x] Beasley catheter, indication: critically ill  Meds: magnesium oxide 800 milliGRAM(s) Oral two times a day with meals      HEMATOLOGIC  Meds: aspirin enteric coated 81 milliGRAM(s) Oral daily    [ ] VTE Prophylaxis                        9.4    4.58  )-----------( 114      ( 13 Jul 2020 05:25 )             28.1     PT/INR - ( 13 Jul 2020 05:25 )   PT: 13.1 sec;   INR: 1.15 ratio    PTT - ( 13 Jul 2020 05:25 )  PTT:28.9 sec  Transfusion     [ ] PRBC   [ ] Platelets   [ ] FFP   [ ] Cryoprecipitate      INFECTIOUS DISEASES  T(C): 36.2 (07-13-20 @ 23:00), Max: 36.8 (07-13-20 @ 07:00)  WBC Count: 4.58 K/uL (07-13 @ 05:25)    Recent Cultures:  Specimen Source: .Blood Blood-Peripheral, 07-12 @ 10:04; Results   No growth to date.; Gram Stain: --; Organism: --  Specimen Source: .Urine Catheterized, 07-11 @ 03:14; Results   No growth; Gram Stain: --; Organism: --  Specimen Source: .Blood Blood-Peripheral, 07-10 @ 00:22; Results   Growth in aerobic bottle: Enterococcus faecium (vancomycin resistant)  "Due to technical problems, Proteus sp. will Not be reported as part of  the BCID panel until further notice"  ***Blood Panel PCR results on this specimen are available  approximately 3 hours after the Gram stain result.***  Gram stain, PCR, and/or culture results may not always  correspond due to difference in methodologies.  ************************************************************  This PCR assay was performed using [employer].  The following targets are tested for: Enterococcus,  vancomycin resistant enterococci, Listeria monocytogenes,  coagulase negative staphylococci, S. aureus,  methicillin resistant S. aureus, Streptococcus agalactiae  (Group B), S. pneumoniae, S. pyogenes (Group A),  Acinetobacter baumannii, Enterobacter cloacae, E. coli,  Klebsiella oxytoca, K. pneumoniae, Proteus sp.,  Serratia marcescens, Haemophilus influenzae,  Neisseria meningitidis, Pseudomonas aeruginosa, Candida  albicans, C. glabrata, C krusei, C parapsilosis,  C. tropicalis and the KPC resistance gene.; Gram Stain:   Growth in aerobic bottle: Gram positive cocci in pairs; Organism: Blood Culture PCR  Enterococcus faecium (vancomycin resistant)    Meds: linezolid  IVPB 600 milliGRAM(s) IV Intermittent every 12 hours  meropenem  IVPB 1000 milliGRAM(s) IV Intermittent every 8 hours  nystatin    Suspension 344789 Unit(s) Swish and Swallow four times a day  tacrolimus 3 milliGRAM(s) Oral <User Schedule>  trimethoprim   80 mG/sulfamethoxazole 400 mG 1 Tablet(s) Oral daily  valGANciclovir 450 milliGRAM(s) Oral daily      ENDOCRINE  Capillary Blood Glucose    Meds: insulin glargine Injectable (LANTUS) 16 Unit(s) SubCutaneous at bedtime  insulin lispro (HumaLOG) corrective regimen sliding scale   SubCutaneous three times a day before meals  insulin lispro (HumaLOG) corrective regimen sliding scale   SubCutaneous at bedtime  insulin lispro Injectable (HumaLOG) 5 Unit(s) SubCutaneous three times a day before meals  predniSONE   Tablet 5 milliGRAM(s) Oral daily      ACCESS DEVICES:  [x] Peripheral IV  [ ] Central Venous Line	[ ] R	[ ] L	[ ] IJ	[ ] Fem	[ ] SC	Placed:   [ ] Arterial Line		[ ] R	[ ] L	[ ] Fem	[ ] Rad	[ ] Ax	Placed:   [ ] PICC:					[ ] Mediport  [x] Urinary Catheter, Date Placed: 7/12  [x] Necessity of urinary, arterial, and venous catheters discussed    OTHER MEDICATIONS:  chlorhexidine 2% Cloths 1 Application(s) Topical <User Schedule>  collagenase Ointment 1 Application(s) Topical daily  nystatin Cream 1 Application(s) Topical two times a day      CODE STATUS: full code    IMAGING: HISTORY  64y Male c/b small esophageal varices (12/2019), portal hypertensive gastropathy, ascites, hx SBP, hx hepatic encephalopathy, GAVE syndrome s/p APC, hx duodenal ulcer (5/2019), HTN, HLD, DM, HFpEF (EF 70%), recent ESBL E coli prostate abscess (3/2020 s/p 4 weeks ertapenem), orthostatic hypotension ( on midodrine) who was initially admitted to medicine for hepatic encephalopathy. Hospital course c/b MISA, VRE UTI, acute blood loss anemia, received blood transfusion, EGD done 6/22 with Grade II esophageal varices, acute gastritis with hemorrhage and acute duodenitis with hemorrhage. Patient had worsening mental status, concerning decompensating liver cirrhosis, hepatic encephalopathy patient then transferred to SICU for further care. He is now s/p OLT complicated by delerium, bacteremia, hyper/hypoglycemia.    24 HOUR EVENTS:  - Pt more delirious, has not slept in >24hrs. Pt given 50mg Seroquel x1 during day, and 15mg Remeron at bedtime to promote sleep.  - Precedex gtt started for increasing delirium  - Lantus 5u given in addition to 16u (7/12) due to elevated glucose to the 200s, pt hypoglycemic to 60s (7/13). Premeal insulin decreased from 8u to 5u. Currently receiving Lantus 16u daily and 5u premeal.,  - Tacro dosed by level, 3mg x2 (7/13)  - Started ursodiol 300mg BID  - Diuresed with Lasix 80mg PO x1  - L groin seroma aspirated, specimen sent for culture  - Pt self-dc'ed final ANDREINA drain      NEURO  Exam: AOx2, confused. +hallucinations, +tremors  Meds: dexMEDEtomidine Infusion 0.5 MICROgram(s)/kG/Hr IV Continuous <Continuous>  [x] Adequacy of sedation and pain control has been assessed and adjusted      RESPIRATORY  RR: 16 (07-14-20 @ 00:00) (12 - 32)  SpO2: 98% (07-14-20 @ 00:00) (94% - 100%)  Exam: unlabored  Meds:       CARDIOVASCULAR  HR: 68 (07-14-20 @ 00:00) (68 - 80)  BP: 103/59 (07-14-20 @ 00:00) (97/54 - 184/123)  BP(mean): 76 (07-14-20 @ 00:00) (70 - 147)  Exam: RRR  Cardiac Rhythm: normal sinus  Perfusion     [x]Adequate   [ ]Inadequate  Mentation   [ ]Normal       [x]Reduced  Extremities  [x]Warm         [ ]Cool  Volume Status [ ]Hypervolemic [x]Euvolemic [ ]Hypovolemic  Meds: tamsulosin 0.8 milliGRAM(s) Oral at bedtime      GI/NUTRITION  Exam: soft, non-tender, non-distended, incisions intact, ANDREINA drains with serosanguinous drainage  Diet: regular, consistent carb  Meds: pantoprazole  Injectable 40 milliGRAM(s) IV Push every 12 hours  ursodiol Capsule 300 milliGRAM(s) Oral two times a day      GENITOURINARY  I&O's Detail    07-12 @ 07:01  -  07-13 @ 07:00  --------------------------------------------------------  IN:    Oral Fluid: 1497 mL    Solution: 850 mL    Solution: 150 mL  Total IN: 2497 mL    OUT:    Drain: 490 mL    Indwelling Catheter - Urethral: 5165 mL  Total OUT: 5655 mL    Total NET: -3158 mL    07-13 @ 07:01  -  07-14 @ 00:23  --------------------------------------------------------  IN:    dexmedetomidine Infusion: 25.8 mL    Oral Fluid: 708 mL    Solution: 350 mL    Solution: 400 mL  Total IN: 1483.8 mL    OUT:    Drain: 100 mL    Indwelling Catheter - Urethral: 3640 mL  Total OUT: 3740 mL    Total NET: -2256.2 mL      07-13    135  |  98  |  28<H>  ----------------------------<  121<H>  4.3   |  30  |  1.19    Ca    8.5      13 Jul 2020 05:25  Phos  3.0     07-13  Mg     1.7     07-13    TPro  4.6<L>  /  Alb  2.6<L>  /  TBili  1.3<H>  /  DBili  0.6<H>  /  AST  71<H>  /  ALT  119<H>  /  AlkPhos  240<H>  07-13    [x] Beasley catheter, indication: critically ill  Meds: magnesium oxide 800 milliGRAM(s) Oral two times a day with meals      HEMATOLOGIC  Meds: aspirin enteric coated 81 milliGRAM(s) Oral daily    [ ] VTE Prophylaxis                        9.4    4.58  )-----------( 114      ( 13 Jul 2020 05:25 )             28.1     PT/INR - ( 13 Jul 2020 05:25 )   PT: 13.1 sec;   INR: 1.15 ratio    PTT - ( 13 Jul 2020 05:25 )  PTT:28.9 sec  Transfusion     [ ] PRBC   [ ] Platelets   [ ] FFP   [ ] Cryoprecipitate      INFECTIOUS DISEASES  T(C): 36.2 (07-13-20 @ 23:00), Max: 36.8 (07-13-20 @ 07:00)  WBC Count: 4.58 K/uL (07-13 @ 05:25)    Recent Cultures:  Specimen Source: .Blood Blood-Peripheral, 07-12 @ 10:04; Results   No growth to date.; Gram Stain: --; Organism: --  Specimen Source: .Urine Catheterized, 07-11 @ 03:14; Results   No growth; Gram Stain: --; Organism: --  Specimen Source: .Blood Blood-Peripheral, 07-10 @ 00:22; Results   Growth in aerobic bottle: Enterococcus faecium (vancomycin resistant)  "Due to technical problems, Proteus sp. will Not be reported as part of  the BCID panel until further notice"  ***Blood Panel PCR results on this specimen are available  approximately 3 hours after the Gram stain result.***  Gram stain, PCR, and/or culture results may not always  correspond due to difference in methodologies.  ************************************************************  This PCR assay was performed using [employer].  The following targets are tested for: Enterococcus,  vancomycin resistant enterococci, Listeria monocytogenes,  coagulase negative staphylococci, S. aureus,  methicillin resistant S. aureus, Streptococcus agalactiae  (Group B), S. pneumoniae, S. pyogenes (Group A),  Acinetobacter baumannii, Enterobacter cloacae, E. coli,  Klebsiella oxytoca, K. pneumoniae, Proteus sp.,  Serratia marcescens, Haemophilus influenzae,  Neisseria meningitidis, Pseudomonas aeruginosa, Candida  albicans, C. glabrata, C krusei, C parapsilosis,  C. tropicalis and the KPC resistance gene.; Gram Stain:   Growth in aerobic bottle: Gram positive cocci in pairs; Organism: Blood Culture PCR  Enterococcus faecium (vancomycin resistant)    Meds: linezolid  IVPB 600 milliGRAM(s) IV Intermittent every 12 hours  meropenem  IVPB 1000 milliGRAM(s) IV Intermittent every 8 hours  nystatin    Suspension 195329 Unit(s) Swish and Swallow four times a day  tacrolimus 3 milliGRAM(s) Oral <User Schedule>  trimethoprim   80 mG/sulfamethoxazole 400 mG 1 Tablet(s) Oral daily  valGANciclovir 450 milliGRAM(s) Oral daily      ENDOCRINE  Capillary Blood Glucose    Meds: insulin glargine Injectable (LANTUS) 16 Unit(s) SubCutaneous at bedtime  insulin lispro (HumaLOG) corrective regimen sliding scale   SubCutaneous three times a day before meals  insulin lispro (HumaLOG) corrective regimen sliding scale   SubCutaneous at bedtime  insulin lispro Injectable (HumaLOG) 5 Unit(s) SubCutaneous three times a day before meals  predniSONE   Tablet 5 milliGRAM(s) Oral daily      ACCESS DEVICES:  [x] Peripheral IV  [ ] Central Venous Line	[ ] R	[ ] L	[ ] IJ	[ ] Fem	[ ] SC	Placed:   [ ] Arterial Line		[ ] R	[ ] L	[ ] Fem	[ ] Rad	[ ] Ax	Placed:   [ ] PICC:					[ ] Mediport  [x] Urinary Catheter, Date Placed: 7/12  [x] Necessity of urinary, arterial, and venous catheters discussed    OTHER MEDICATIONS:  chlorhexidine 2% Cloths 1 Application(s) Topical <User Schedule>  collagenase Ointment 1 Application(s) Topical daily  nystatin Cream 1 Application(s) Topical two times a day      CODE STATUS: full code    IMAGING:

## 2020-07-14 NOTE — PROGRESS NOTE ADULT - PROBLEM SELECTOR PLAN 1
-test BG AC/HS  -Decrease Lantus 13 units AC meals  -Humalog dc'd by primary team. Recommend start Humalog 4 units w/meals (hold if pt not eating-can give after pt starts eating since PCA sitting at bedside to assess)  -c/w Humalog low correction scale AC and Low HS scale  DISPO: basal/bolus, with doses TBD  -Pt can resume f/u with Dr. Mathur or   [facility] [address] [place] [address] [phone].

## 2020-07-14 NOTE — PROGRESS NOTE ADULT - SUBJECTIVE AND OBJECTIVE BOX
INTERVAL HPI/OVERNIGHT EVENTS:    events noted  agitated overnight   precedex drip started for delirium  self-removal of ANDREINA drain  s/p aspiration of L groin seroma with PMNS, no organisms  remains tremulous    hgb stable         MEDICATIONS  (STANDING):  aspirin enteric coated 81 milliGRAM(s) Oral daily  chlorhexidine 2% Cloths 1 Application(s) Topical <User Schedule>  collagenase Ointment 1 Application(s) Topical daily  furosemide    Tablet 80 milliGRAM(s) Oral every 8 hours  insulin glargine Injectable (LANTUS) 16 Unit(s) SubCutaneous at bedtime  insulin lispro (HumaLOG) corrective regimen sliding scale   SubCutaneous three times a day before meals  insulin lispro (HumaLOG) corrective regimen sliding scale   SubCutaneous at bedtime  insulin lispro Injectable (HumaLOG) 10 Unit(s) SubCutaneous before dinner  insulin lispro Injectable (HumaLOG) 8 Unit(s) SubCutaneous before breakfast  insulin lispro Injectable (HumaLOG) 8 Unit(s) SubCutaneous before lunch  linezolid  IVPB 600 milliGRAM(s) IV Intermittent every 12 hours  magnesium oxide 800 milliGRAM(s) Oral two times a day with meals  meropenem  IVPB 1000 milliGRAM(s) IV Intermittent every 8 hours  nystatin    Suspension 424730 Unit(s) Swish and Swallow four times a day  nystatin Cream 1 Application(s) Topical two times a day  pantoprazole  Injectable 40 milliGRAM(s) IV Push every 12 hours  polyethylene glycol 3350 17 Gram(s) Oral daily  predniSONE   Tablet 5 milliGRAM(s) Oral daily  senna 2 Tablet(s) Oral at bedtime  tacrolimus 3 milliGRAM(s) Oral <User Schedule>  tacrolimus 2 milliGRAM(s) Oral <User Schedule>  tamsulosin 0.8 milliGRAM(s) Oral at bedtime  trimethoprim   80 mG/sulfamethoxazole 400 mG 1 Tablet(s) Oral daily  valGANciclovir 450 milliGRAM(s) Oral daily    MEDICATIONS  (PRN):  glucagon  Injectable 1 milliGRAM(s) IntraMuscular once PRN Glucose <70 milliGRAM(s)/deciLiter      Allergies    codeine (Anaphylaxis)    Intolerances        Review of Systems:    General:  No wt loss, fevers, chills, night sweats,fatigue,   Eyes:  Good vision, no reported pain  ENT:  No sore throat, pain, runny nose, dysphagia  CV:  No pain, palpitatioins, hypo/hypertension  Resp:  No dyspnea, cough, tachypnea, wheezing  GI:  No pain, No nausea, No vomiting, No diarrhea, No constipatiion, No weight loss, No fever, No pruritis, No rectal bleeding, No tarry stools, No dysphagia,  :  No pain, bleeding, incontinence, nocturia  Muscle:  No pain, weakness  Neuro:  No weakness, tingling, memory problems  Psych:  No fatigue, insomnia, mood problems, depression  Endocrine:  No polyuria, polydypsia, cold/heat intolerance  Heme:  No petechiae, ecchymosis, easy bruisability  Skin:  No rash, tattoos, scars, edema      Vital Signs Last 24 Hrs  T(C): 36.3 (12 Jul 2020 15:00), Max: 36.8 (11 Jul 2020 19:00)  T(F): 97.3 (12 Jul 2020 15:00), Max: 98.2 (11 Jul 2020 19:00)  HR: 80 (12 Jul 2020 15:00) (72 - 83)  BP: 109/59 (12 Jul 2020 15:00) (94/55 - 154/68)  BP(mean): 78 (12 Jul 2020 15:00) (70 - 125)  RR: 15 (12 Jul 2020 15:00) (9 - 28)  SpO2: 96% (12 Jul 2020 15:00) (96% - 100%)    PHYSICAL EXAM:    Constitutional: NAD  HEENT: sclera anicteric   Neck: No LAD, supple  Gastrointestinal: BS+, soft, ND, +incisional tenderness, incisions c/d/i  Extremities: +b/l peripheral edema  Neurological: awake, alert, +tremors     LABS:                        9.3    5.51  )-----------( 98       ( 12 Jul 2020 01:54 )             28.6     07-12    131<L>  |  95<L>  |  31<H>  ----------------------------<  217<H>  5.0   |  27  |  1.26    Ca    8.1<L>      12 Jul 2020 12:22  Phos  4.0     07-12  Mg     2.0     07-12    TPro  4.4<L>  /  Alb  2.6<L>  /  TBili  1.1  /  DBili  0.5<H>  /  AST  64<H>  /  ALT  98<H>  /  AlkPhos  212<H>  07-12    PT/INR - ( 12 Jul 2020 01:54 )   PT: 12.7 sec;   INR: 1.12 ratio         PTT - ( 12 Jul 2020 01:54 )  PTT:28.7 sec      RADIOLOGY & ADDITIONAL TESTS:

## 2020-07-14 NOTE — PROGRESS NOTE ADULT - SUBJECTIVE AND OBJECTIVE BOX
Diabetes Follow up note:    Chief complaint: T2DM     Interval Hx: Fasting glucose in 70s this AM. Premeal insulin held this AM. Tolerating POs. Pt seen at bedside w/1:1 present. Pt sleepy at time of visit but PCA endorsed ate most of breakfast this AM.     Review of Systems:  General: no complaints.   GI: Tolerating POs. Denies N/V/D/Abd pain  CV: Denies CP/SOB  ENDO: No S&Sx of hypoglycemia  MEDS:  insulin glargine Injectable (LANTUS) 16 Unit(s) SubCutaneous at bedtime  insulin lispro (HumaLOG) corrective regimen sliding scale   SubCutaneous three times a day before meals  insulin lispro (HumaLOG) corrective regimen sliding scale   SubCutaneous at bedtime  predniSONE   Tablet 5 milliGRAM(s) Oral daily    linezolid  IVPB 600 milliGRAM(s) IV Intermittent every 12 hours  meropenem  IVPB 1000 milliGRAM(s) IV Intermittent every 8 hours  nystatin    Suspension 753518 Unit(s) Swish and Swallow four times a day  trimethoprim   80 mG/sulfamethoxazole 400 mG 1 Tablet(s) Oral daily  valGANciclovir 450 milliGRAM(s) Oral daily    Allergies    codeine (Anaphylaxis)          PE:  General: Male lying in bed. NAD.   Vital Signs Last 24 Hrs  T(C): 37 (14 Jul 2020 11:00), Max: 37 (14 Jul 2020 11:00)  T(F): 98.6 (14 Jul 2020 11:00), Max: 98.6 (14 Jul 2020 11:00)  HR: 77 (14 Jul 2020 11:42) (57 - 81)  BP: 131/59 (14 Jul 2020 11:06) (92/53 - 184/123)  BP(mean): 85 (14 Jul 2020 11:00) (67 - 147)  RR: 26 (14 Jul 2020 11:06) (10 - 32)  SpO2: 100% (14 Jul 2020 11:42) (94% - 100%)  Abd: Soft, NT,ND, Central adiposity. dsg c/d/i  Extremities: Warm. Venodynes in place.   Neuro: Sleepy at time of visit. + tremors    LABS:  POCT Blood Glucose.: 197 mg/dL (07-14-20 @ 11:45)  POCT Blood Glucose.: 90 mg/dL (07-14-20 @ 08:18)  POCT Blood Glucose.: 151 mg/dL (07-13-20 @ 21:10)  POCT Blood Glucose.: 100 mg/dL (07-13-20 @ 17:28)  POCT Blood Glucose.: 80 mg/dL (07-13-20 @ 16:58)  POCT Blood Glucose.: 62 mg/dL (07-13-20 @ 16:42)  POCT Blood Glucose.: 65 mg/dL (07-13-20 @ 16:42)  POCT Blood Glucose.: 172 mg/dL (07-13-20 @ 13:30)  POCT Blood Glucose.: 150 mg/dL (07-13-20 @ 12:00)  POCT Blood Glucose.: 111 mg/dL (07-13-20 @ 07:40)  POCT Blood Glucose.: 228 mg/dL (07-12-20 @ 23:59)  POCT Blood Glucose.: 198 mg/dL (07-12-20 @ 21:39)  POCT Blood Glucose.: 239 mg/dL (07-12-20 @ 17:09)  POCT Blood Glucose.: 219 mg/dL (07-12-20 @ 11:43)  POCT Blood Glucose.: 214 mg/dL (07-12-20 @ 07:28)  POCT Blood Glucose.: 324 mg/dL (07-11-20 @ 21:13)  POCT Blood Glucose.: 281 mg/dL (07-11-20 @ 17:11)  POCT Blood Glucose.: 194 mg/dL (07-11-20 @ 12:20)                            10.2   4.68  )-----------( 136      ( 14 Jul 2020 07:05 )             31.3       07-14    138  |  99  |  27<H>  ----------------------------<  76  4.4   |  31  |  1.26    Ca    8.8      14 Jul 2020 07:05  Phos  3.8     07-14  Mg     2.0     07-14    TPro  5.3<L>  /  Alb  2.9<L>  /  TBili  1.2  /  DBili  0.6<H>  /  AST  36  /  ALT  90<H>  /  AlkPhos  199<H>  07-14     A1C with Estimated Average Glucose Result: 5.2 % (06-14-20 @ 11:00)  A1C with Estimated Average Glucose Result: 5.4 % (06-12-20 @ 08:26)  A1C with Estimated Average Glucose Result: 4.8 % (04-26-20 @ 09:39)  Hemoglobin A1C, Whole Blood: 6.4 % (02-12-20 @ 17:08)  Hemoglobin A1C, Whole Blood: 5.8 % (12-04-19 @ 08:38)          Contact number: kit [phone] or [phone]

## 2020-07-14 NOTE — PROGRESS NOTE ADULT - SUBJECTIVE AND OBJECTIVE BOX
Chief Complaint:  Patient is a 64y old  Male who presents with a chief complaint of Liver failure, hepatic encephalopathy (2020 12:05)      Interval Events: Overnight pt agitated and required precedex gtt. Having issues with sleep wake cycle. This morning following commands and answering questions. ANDREINA drain self removed.     Allergies:  codeine (Anaphylaxis)      Hospital Medications:  aspirin enteric coated 81 milliGRAM(s) Oral daily  chlorhexidine 2% Cloths 1 Application(s) Topical <User Schedule>  collagenase Ointment 1 Application(s) Topical daily  insulin glargine Injectable (LANTUS) 13 Unit(s) SubCutaneous at bedtime  insulin lispro (HumaLOG) corrective regimen sliding scale   SubCutaneous three times a day before meals  insulin lispro (HumaLOG) corrective regimen sliding scale   SubCutaneous at bedtime  linezolid  IVPB 600 milliGRAM(s) IV Intermittent every 12 hours  magnesium oxide 800 milliGRAM(s) Oral two times a day with meals  meropenem  IVPB 1000 milliGRAM(s) IV Intermittent every 8 hours  mirtazapine 30 milliGRAM(s) Oral once  nystatin    Suspension [id number] Unit(s) Swish and Swallow four times a day  nystatin Cream 1 Application(s) Topical two times a day  pantoprazole    Tablet 40 milliGRAM(s) Oral before breakfast  predniSONE   Tablet 5 milliGRAM(s) Oral daily  tacrolimus 3 milliGRAM(s) Oral <User Schedule>  tamsulosin 0.8 milliGRAM(s) Oral at bedtime  trimethoprim   80 mG/sulfamethoxazole 400 mG 1 Tablet(s) Oral daily  ursodiol Capsule 300 milliGRAM(s) Oral two times a day  valGANciclovir 450 milliGRAM(s) Oral daily      PMHX/PSHX:  GIB (gastrointestinal bleeding)  GERD with esophagitis  Hepatic encephalopathy  Obesity  Fatty liver disease, nonalcoholic  Renal stones  Hypertension  Neuropathy  Hypercholesteremia  Diabetes  S/P cholecystectomy  No significant past surgical history      Family history:  Family history of type 2 diabetes mellitus  Family history of hypertension  Family history of stomach cancer  No pertinent family history in first degree relatives      ROS: As per HPI, 14-point ROS negative otherwise.        PHYSICAL EXAM:     Vital Signs:  Vital Signs Last 24 Hrs  T(C): 37 (2020 11:00), Max: 37 (2020 11:00)  T(F): 98.6 (2020 11:00), Max: 98.6 (2020 11:00)  HR: 83 (2020 14:00) (57 - 83)  BP: 112/56 (2020 14:00) (92/53 - 131/59)  BP(mean): 79 (2020 14:00) (67 - 97)  RR: 18 (2020 14:00) (10 - 32)  SpO2: 100% (2020 14:00) (94% - 100%)  Daily     Daily Weight in k.3 (2020 00:58)    GENERAL: no acute distress  NEURO: alert, AAOx2, +tremors, answers questions and follows commands  HEENT: anicteric sclera, no conjunctival pallor appreciated  CHEST: no respiratory distress, no accessory muscle use  CARDIAC: regular rate, rhythm  ABDOMEN: soft, incisions intact, mildly tender, ND  EXTREMITIES: warm, well perfused, trace BLLE edema  SKIN: R heel with small open wound with overlying dressing, no purulence    LABS:                        10.2   4.68  )-----------( 136      ( 2020 07:05 )             31.3     07-14    138  |  99  |  27<H>  ----------------------------<  76  4.4   |  31  |  1.26    Ca    8.8      2020 07:05  Phos  3.8     07-14  Mg     2.0     07-14    TPro  5.3<L>  /  Alb  2.9<L>  /  TBili  1.2  /  DBili  0.6<H>  /  AST  36  /  ALT  90<H>  /  AlkPhos  199<H>  07-14    LIVER FUNCTIONS - ( 2020 07:05 )  Alb: 2.9 g/dL / Pro: 5.3 g/dL / ALK PHOS: 199 U/L / ALT: 90 U/L / AST: 36 U/L / GGT: x           PT/INR - ( 2020 07:05 )   PT: 11.8 sec;   INR: 0.99 ratio         PTT - ( 2020 07:05 )  PTT:28.5 sec    Amylase Serum--      Lipase serum--       [id number]      Imaging:  < from: US Abdomen Doppler (20 @ 18:46) >  IMPRESSION:     Status post liver transplant. Elevated velocities seen in the hepatic arteries, most pronounced in main hepatic artery 212 cm/s, slightly increased when compared to prior ultrasound evaluation, 158 cm/sec.  Normal resistive indice and no tardus parvus waveform identified.      Perihepatic fluid collection, likely a seroma. Hematoma is felt less likely.     Patent portal and hepatic veins.     Stable echogenic focus is seen in the IVC which may be related to the surgical anastomosis.     Follow-up is recommended.           < end of copied text >

## 2020-07-14 NOTE — PROGRESS NOTE ADULT - SUBJECTIVE AND OBJECTIVE BOX
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    Interval History/ROS:Patient is a 64y old  Male who presents with a chief complaint of Liver failure, hepatic encephalopathy (14 Jul 2020 10:36)    Allergies  codeine (Anaphylaxis)    ANTIMICROBIALS:    linezolid  IVPB 600 every 12 hours 7/10-  meropenem  IVPB 1000 every 8 hours 7/2-  nystatin    Suspension 323808 four times a day 6/11-  trimethoprim   80 mG/sulfamethoxazole 400 mG 1 daily 6/11-  valGANciclovir 450 daily 7/3-    ANTIMICROBIALS (past 90 days):  MEDICATIONS  (STANDING):  s/p clarithromycin 6/30-7/1  s/p DAPTOmycin IVPB 7/3-4  s/p linezolid    Tablet 6/15-6/22  s/p metroNIDAZOLE  6/30-7/1  s/p rifAXIMin 6/11-7/1  s/p meropenem 6/12-6/15    OTHER MEDS: MEDICATIONS  (STANDING):  aspirin enteric coated 81 daily  insulin glargine Injectable (LANTUS) 16 at bedtime  insulin lispro (HumaLOG) corrective regimen sliding scale  three times a day before meals  insulin lispro (HumaLOG) corrective regimen sliding scale  at bedtime  mirtazapine 30 once  pantoprazole    Tablet 40 before breakfast  predniSONE   Tablet 5 daily  tacrolimus 3 <User Schedule>  tamsulosin 0.8 at bedtime  ursodiol Capsule 300 two times a day    Vital Signs Last 24 Hrs  T(F): 98.6 (07-14-20 @ 11:00), Max: 98.6 (07-10-20 @ 23:00)    Vital Signs Last 24 Hrs  HR: 77 (07-14-20 @ 11:42) (57 - 81)  BP: 131/59 (07-14-20 @ 11:06) (92/53 - 184/123)  RR: 26 (07-14-20 @ 11:06)  SpO2: 100% (07-14-20 @ 11:42) (94% - 100%)  Wt(kg): --    EXAM:  Constitutional: Not in acute distress  Eyes: pupils bilaterally reactive to light. No icterus.  Oral cavity: Clear, no lesions  Neck: No neck vein distension noted  RS: Chest clear to auscultation bilaterally. No wheeze/rhonchi/crepitations.  CVS: S1, S2 heard. Regular rate and rhythm. No murmurs/rubs/gallops.  Abdomen: Soft. No guarding/rigidity/tenderness.  : No acute abnormalities  Extremities: Warm. No pedal edema  Skin: No lesions noted  Vascular: No evidence of phlebitis  Neuro: Alert, oriented to time/place/person    Labs:                        10.2   4.68  )-----------( 136      ( 14 Jul 2020 07:05 )             31.3     07-14    138  |  99  |  27<H>  ----------------------------<  76  4.4   |  31  |  1.26    Ca    8.8      14 Jul 2020 07:05  Phos  3.8     07-14  Mg     2.0     07-14    TPro  5.3<L>  /  Alb  2.9<L>  /  TBili  1.2  /  DBili  0.6<H>  /  AST  36  /  ALT  90<H>  /  AlkPhos  199<H>  07-14      Auto Eosinophil %: 0.0 % (07-05-20 @ 13:53)  Auto Eosinophil %: 5.0 % (06-19-20 @ 06:56)    WBC Count: 4.68 (07-14-20 @ 07:05)  WBC Count: 4.58 (07-13-20 @ 05:25)  WBC Count: 5.51 (07-12-20 @ 01:54)    Creatinine, Serum: 1.26 (07-14)  Creatinine, Serum: 1.19 (07-13)  Creatinine, Serum: 1.26 (07-12)      MICROBIOLOGY:    Culture - Body Fluid with Gram Stain (collected 14 Jul 2020 00:32)  Source: .Body Fluid Abdominal Fluid  Gram Stain (14 Jul 2020 04:12):    polymorphonuclear leukocytes seen    No organisms seen    by cytocentrifuge    Culture - Blood (collected 12 Jul 2020 10:04)  Source: .Blood Blood-Peripheral  Preliminary Report (13 Jul 2020 11:01):    No growth to date.    Culture - Blood (collected 12 Jul 2020 10:04)  Source: .Blood Blood-Peripheral  Preliminary Report (13 Jul 2020 11:01):    No growth to date.    Culture - Urine (collected 11 Jul 2020 03:14)  Source: .Urine Catheterized  Final Report (11 Jul 2020 23:38):    No growth    Culture - Blood (collected 10 Jul 2020 00:22)  Source: .Blood Blood-Peripheral  Preliminary Report (11 Jul 2020 01:03):    No growth to date.    Culture - Blood (collected 10 Jul 2020 00:22)  Source: .Blood Blood-Peripheral  Enterococcus faecium (vancomycin resistant) (12 Jul 2020 11:59)  Organism: Enterococcus faecium (vancomycin resistant) (12 Jul 2020 11:59)      -  Ampicillin: R >8 Predicts results to ampicillin/sulbactam, amoxacillin-clavulanate and  piperacillin-tazobactam.      -  Daptomycin: S 4      -  Gentamicin synergy: S      -  Linezolid: S 1      -  Streptomycin synergy: R      -  Vancomycin: R >16      Method Type: LEONARDO      CMV IgG Antibody: <0.20 U/mL (12-04-19 @ 08:33)  Toxoplasma IgG Screen: <3.0 IU/mL (12-04-19 @ 08:33)    RADIOLOGY:  imaging below personally reviewed    OTHER TESTS:  COVID-19 PCR: NotDetec (07-09-20 @ 14:59)  COVID-19 PCR: NotDetec (06-11-20 @ 14:52)    Procalcitonin, Serum: 0.68 (07-14 @ 07:05)  Procalcitonin, Serum: 0.87 (07-13 @ 05:25)  Procalcitonin, Serum: 1.10 (07-09 @ 18:15)  Procalcitonin, Serum: 0.13 (06-24 @ 03:24)  Procalcitonin, Serum: 0.12 (06-21 @ 20:24) 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    Interval History/ROS:Patient is a 64y old  Male who presents with a chief complaint of Liver failure, hepatic encephalopathy (14 Jul 2020 10:36)    - Abd US showed 9cm Left groin collection: likely represents a seroma or lymphocele; aspirated - cx: PMN seen, no organism seen    Allergies  codeine (Anaphylaxis)    ANTIMICROBIALS:    linezolid  IVPB 600 every 12 hours 7/10-  meropenem  IVPB 1000 every 8 hours 7/2-  nystatin    Suspension 067470 four times a day 6/11-  trimethoprim   80 mG/sulfamethoxazole 400 mG 1 daily 6/11-  valGANciclovir 450 daily 7/3-    ANTIMICROBIALS (past 90 days):  MEDICATIONS  (STANDING):  s/p clarithromycin 6/30-7/1  s/p DAPTOmycin IVPB 7/3-4  s/p linezolid    Tablet 6/15-6/22  s/p metroNIDAZOLE  6/30-7/1  s/p rifAXIMin 6/11-7/1  s/p meropenem 6/12-6/15    OTHER MEDS: MEDICATIONS  (STANDING):  aspirin enteric coated 81 daily  insulin glargine Injectable (LANTUS) 16 at bedtime  insulin lispro (HumaLOG) corrective regimen sliding scale  three times a day before meals  insulin lispro (HumaLOG) corrective regimen sliding scale  at bedtime  mirtazapine 30 once  pantoprazole    Tablet 40 before breakfast  predniSONE   Tablet 5 daily  tacrolimus 3 <User Schedule>  tamsulosin 0.8 at bedtime  ursodiol Capsule 300 two times a day    Vital Signs Last 24 Hrs  T(F): 98.6 (07-14-20 @ 11:00), Max: 98.6 (07-10-20 @ 23:00)    Vital Signs Last 24 Hrs  HR: 77 (07-14-20 @ 11:42) (57 - 81)  BP: 131/59 (07-14-20 @ 11:06) (92/53 - 184/123)  RR: 26 (07-14-20 @ 11:06)  SpO2: 100% (07-14-20 @ 11:42) (94% - 100%)  Wt(kg): --    EXAM:  Constitutional: Not in acute distress  Eyes: pupils bilaterally reactive to light. No icterus.  Oral cavity: Clear, no lesions  Neck: No neck vein distension noted  RS: Chest clear to auscultation bilaterally. No wheeze/rhonchi/crepitations.  CVS: S1, S2 heard. Regular rate and rhythm. No murmurs/rubs/gallops.  Abdomen: Soft. No guarding/rigidity/tenderness.  : No acute abnormalities  Extremities: Warm. No pedal edema  Skin: No lesions noted  Vascular: No evidence of phlebitis  Neuro: Alert, oriented to time/place/person    Labs:                        10.2   4.68  )-----------( 136      ( 14 Jul 2020 07:05 )             31.3     07-14    138  |  99  |  27<H>  ----------------------------<  76  4.4   |  31  |  1.26    Ca    8.8      14 Jul 2020 07:05  Phos  3.8     07-14  Mg     2.0     07-14    TPro  5.3<L>  /  Alb  2.9<L>  /  TBili  1.2  /  DBili  0.6<H>  /  AST  36  /  ALT  90<H>  /  AlkPhos  199<H>  07-14      Auto Eosinophil %: 0.0 % (07-05-20 @ 13:53)  Auto Eosinophil %: 5.0 % (06-19-20 @ 06:56)    WBC Count: 4.68 (07-14-20 @ 07:05)  WBC Count: 4.58 (07-13-20 @ 05:25)  WBC Count: 5.51 (07-12-20 @ 01:54)    Creatinine, Serum: 1.26 (07-14)  Creatinine, Serum: 1.19 (07-13)  Creatinine, Serum: 1.26 (07-12)      MICROBIOLOGY:    Culture - Body Fluid with Gram Stain (collected 14 Jul 2020 00:32)  Source: .Body Fluid Abdominal Fluid  Gram Stain (14 Jul 2020 04:12):    polymorphonuclear leukocytes seen    No organisms seen    by cytocentrifuge    Culture - Blood (collected 12 Jul 2020 10:04)  Source: .Blood Blood-Peripheral  Preliminary Report (13 Jul 2020 11:01):    No growth to date.    Culture - Blood (collected 12 Jul 2020 10:04)  Source: .Blood Blood-Peripheral  Preliminary Report (13 Jul 2020 11:01):    No growth to date.    Culture - Urine (collected 11 Jul 2020 03:14)  Source: .Urine Catheterized  Final Report (11 Jul 2020 23:38):    No growth    Culture - Blood (collected 10 Jul 2020 00:22)  Source: .Blood Blood-Peripheral  Preliminary Report (11 Jul 2020 01:03):    No growth to date.    Culture - Blood (collected 10 Jul 2020 00:22)  Source: .Blood Blood-Peripheral  Enterococcus faecium (vancomycin resistant) (12 Jul 2020 11:59)  Organism: Enterococcus faecium (vancomycin resistant) (12 Jul 2020 11:59)      -  Ampicillin: R >8 Predicts results to ampicillin/sulbactam, amoxacillin-clavulanate and  piperacillin-tazobactam.      -  Daptomycin: S 4      -  Gentamicin synergy: S      -  Linezolid: S 1      -  Streptomycin synergy: R      -  Vancomycin: R >16      Method Type: LEONARDO      CMV IgG Antibody: <0.20 U/mL (12-04-19 @ 08:33)  Toxoplasma IgG Screen: <3.0 IU/mL (12-04-19 @ 08:33)    RADIOLOGY:  imaging below personally reviewed    < from: US Duplex Arterial Lower Ext Ltd, Left (07.13.20 @ 16:25) >  INTERPRETATION:  CLINICAL INFORMATION: Evaluate fluid collection seen on CT. Left groin.  COMPARISON: CT dictated 7/11/2020.  TECHNIQUE: Sonography of the abdomen.   FINDINGS:  There is a 9.2 x 3.6 x 5.7 cm anechoic fluid collection with some complexity and no color-flow on Doppler.   IMPRESSION:   There is a 9 cm mildly complex, anechoic fluid collection that likely represents a seroma or lymphocele.  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 He is less confused today. Still have tremor for unknown reason that has persisted for at least a year.  No fever or chills. Pt does not complain of symptoms.  Abd US showed 9cm Left groin collection: likely represents a seroma or lymphocele; aspirated - cx: PMN seen, no organism seen    Allergies  codeine (Anaphylaxis)    ANTIMICROBIALS:    linezolid  IVPB 600 every 12 hours 7/10-  meropenem  IVPB 1000 every 8 hours 7/2-  nystatin    Suspension [id number] four times a day 6/11-  trimethoprim   80 mG/sulfamethoxazole 400 mG 1 daily 6/11-  valGANciclovir 450 daily 7/3-    ANTIMICROBIALS (past 90 days):  MEDICATIONS  (STANDING):  s/p clarithromycin 6/30-7/1  s/p DAPTOmycin IVPB 7/3-4  s/p linezolid    Tablet 6/15-6/22  s/p metroNIDAZOLE  6/30-7/1  s/p rifAXIMin 6/11-7/1  s/p meropenem 6/12-6/15    OTHER MEDS: MEDICATIONS  (STANDING):  aspirin enteric coated 81 daily  insulin glargine Injectable (LANTUS) 16 at bedtime  insulin lispro (HumaLOG) corrective regimen sliding scale  three times a day before meals  insulin lispro (HumaLOG) corrective regimen sliding scale  at bedtime  mirtazapine 30 once  pantoprazole    Tablet 40 before breakfast  predniSONE   Tablet 5 daily  tacrolimus 3 <User Schedule>  tamsulosin 0.8 at bedtime  ursodiol Capsule 300 two times a day    Vital Signs Last 24 Hrs  T(F): 98.6 (07-14-20 @ 11:00), Max: 98.6 (07-10-20 @ 23:00)    Vital Signs Last 24 Hrs  HR: 77 (07-14-20 @ 11:42) (57 - 81)  BP: 131/59 (07-14-20 @ 11:06) (92/53 - 184/123)  RR: 26 (07-14-20 @ 11:06)  SpO2: 100% (07-14-20 @ 11:42) (94% - 100%)  Wt(kg): --    EXAM:  Constitutional: Not in acute distress. Tremor+  Eyes: pupils bilaterally reactive to light. No icterus.  Oral cavity: Clear, no lesions  Neck: No neck vein distension noted  RS: Chest clear to auscultation bilaterally. No wheeze/rhonchi/crepitations.  CVS: S1, S2 heard. Regular rate and rhythm. No murmurs/rubs/gallops.  Abdomen: Liver transplant surgical scar seen, wound clean. Ascites drainage port seen, no fluid out. Left groin surgical scar seen, no collection seen.  : No acute abnormalities  Extremities: Warm. No pedal edema  Skin: No lesions noted  Vascular: No evidence of phlebitis  Neuro: A&O*3    Labs:                        10.2   4.68  )-----------( 136      ( 14 Jul 2020 07:05 )             31.3     07-14    138  |  99  |  27<H>  ----------------------------<  76  4.4   |  31  |  1.26    Ca    8.8      14 Jul 2020 07:05  Phos  3.8     07-14  Mg     2.0     07-14    TPro  5.3<L>  /  Alb  2.9<L>  /  TBili  1.2  /  DBili  0.6<H>  /  AST  36  /  ALT  90<H>  /  AlkPhos  199<H>  07-14      Auto Eosinophil %: 0.0 % (07-05-20 @ 13:53)  Auto Eosinophil %: 5.0 % (06-19-20 @ 06:56)    WBC Count: 4.68 (07-14-20 @ 07:05)  WBC Count: 4.58 (07-13-20 @ 05:25)  WBC Count: 5.51 (07-12-20 @ 01:54)    Creatinine, Serum: 1.26 (07-14)  Creatinine, Serum: 1.19 (07-13)  Creatinine, Serum: 1.26 (07-12)      MICROBIOLOGY:    Culture - Body Fluid with Gram Stain (collected 14 Jul 2020 00:32)  Source: .Body Fluid Abdominal Fluid  Gram Stain (14 Jul 2020 04:12):    polymorphonuclear leukocytes seen    No organisms seen    by cytocentrifuge    Culture - Blood (collected 12 Jul 2020 10:04)  Source: .Blood Blood-Peripheral  Preliminary Report (13 Jul 2020 11:01):    No growth to date.    Culture - Blood (collected 12 Jul 2020 10:04)  Source: .Blood Blood-Peripheral  Preliminary Report (13 Jul 2020 11:01):    No growth to date.    Culture - Urine (collected 11 Jul 2020 03:14)  Source: .Urine Catheterized  Final Report (11 Jul 2020 23:38):    No growth    Culture - Blood (collected 10 Jul 2020 00:22)  Source: .Blood Blood-Peripheral  Preliminary Report (11 Jul 2020 01:03):    No growth to date.    Culture - Blood (collected 10 Jul 2020 00:22)  Source: .Blood Blood-Peripheral  Enterococcus faecium (vancomycin resistant) (12 Jul 2020 11:59)  Organism: Enterococcus faecium (vancomycin resistant) (12 Jul 2020 11:59)      -  Ampicillin: R >8 Predicts results to ampicillin/sulbactam, amoxacillin-clavulanate and  piperacillin-tazobactam.      -  Daptomycin: S 4      -  Gentamicin synergy: S      -  Linezolid: S 1      -  Streptomycin synergy: R      -  Vancomycin: R >16      Method Type: LEONARDO    CMV IgG Antibody: <0.20 U/mL (12-04-19 @ 08:33)  Toxoplasma IgG Screen: <3.0 IU/mL (12-04-19 @ 08:33)    RADIOLOGY:  imaging below personally reviewed    < from: US Duplex Arterial Lower Ext Ltd, Left (07.13.20 @ 16:25) >  INTERPRETATION:  CLINICAL INFORMATION: Evaluate fluid collection seen on CT. Left groin.  COMPARISON: CT dictated 7/11/2020.  TECHNIQUE: Sonography of the abdomen.   FINDINGS:  There is a 9.2 x 3.6 x 5.7 cm anechoic fluid collection with some complexity and no color-flow on Doppler.   IMPRESSION:   There is a 9 cm mildly complex, anechoic fluid collection that likely represents a seroma or lymphocele.  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 He is less confused today. Still have tremor for unknown reason that has persisted for at least a year.  No fever or chills. Pt does not complain of symptoms.  Abd US showed 9cm Left groin collection: likely represents a seroma or lymphocele; aspirated - cx: PMN seen, no organism seen    Allergies  codeine (Anaphylaxis)    ANTIMICROBIALS:    linezolid  IVPB 600 every 12 hours 7/10-  meropenem  IVPB 1000 every 8 hours 7/2-  nystatin    Suspension [id number] four times a day 6/11-  trimethoprim   80 mG/sulfamethoxazole 400 mG 1 daily 6/11-  valGANciclovir 450 daily 7/3-    ANTIMICROBIALS (past 90 days):  MEDICATIONS  (STANDING):  s/p clarithromycin 6/30-7/1  s/p DAPTOmycin IVPB 7/3-4  s/p linezolid    Tablet 6/15-6/22  s/p metroNIDAZOLE  6/30-7/1  s/p rifAXIMin 6/11-7/1  s/p meropenem 6/12-6/15    OTHER MEDS: MEDICATIONS  (STANDING):  aspirin enteric coated 81 daily  insulin glargine Injectable (LANTUS) 16 at bedtime  insulin lispro (HumaLOG) corrective regimen sliding scale  three times a day before meals  insulin lispro (HumaLOG) corrective regimen sliding scale  at bedtime  mirtazapine 30 once  pantoprazole    Tablet 40 before breakfast  predniSONE   Tablet 5 daily  tacrolimus 3 <User Schedule>  tamsulosin 0.8 at bedtime  ursodiol Capsule 300 two times a day    Vital Signs Last 24 Hrs  T(F): 98.6 (07-14-20 @ 11:00), Max: 98.6 (07-10-20 @ 23:00)    Vital Signs Last 24 Hrs  HR: 77 (07-14-20 @ 11:42) (57 - 81)  BP: 131/59 (07-14-20 @ 11:06) (92/53 - 184/123)  RR: 26 (07-14-20 @ 11:06)  SpO2: 100% (07-14-20 @ 11:42) (94% - 100%)  Wt(kg): --    EXAM:  Constitutional: Not in acute distress. Tremor+  Eyes: pupils bilaterally reactive to light. No icterus.  Oral cavity: Clear, no lesions  Neck: No neck vein distension noted  RS: Chest clear to auscultation bilaterally. No wheeze/rhonchi/crepitations.  CVS: S1, S2 heard. Regular rate and rhythm. No murmurs/rubs/gallops.  Abdomen: Liver transplant surgical scar seen, wound clean. Ascites drainage port seen, no fluid out. Left groin surgical scar seen, no collection seen.  : No acute abnormalities  Extremities: Warm. No pedal edema  Skin: No lesions noted  Vascular: No evidence of phlebitis  Neuro: A&O*3    Labs:                        10.2   4.68  )-----------( 136      ( 14 Jul 2020 07:05 )             31.3     07-14    138  |  99  |  27<H>  ----------------------------<  76  4.4   |  31  |  1.26    Ca    8.8      14 Jul 2020 07:05  Phos  3.8     07-14  Mg     2.0     07-14    TPro  5.3<L>  /  Alb  2.9<L>  /  TBili  1.2  /  DBili  0.6<H>  /  AST  36  /  ALT  90<H>  /  AlkPhos  199<H>  07-14      Auto Eosinophil %: 0.0 % (07-05-20 @ 13:53)  Auto Eosinophil %: 5.0 % (06-19-20 @ 06:56)    WBC Count: 4.68 (07-14-20 @ 07:05)  WBC Count: 4.58 (07-13-20 @ 05:25)  WBC Count: 5.51 (07-12-20 @ 01:54)    Creatinine, Serum: 1.26 (07-14)  Creatinine, Serum: 1.19 (07-13)  Creatinine, Serum: 1.26 (07-12)      MICROBIOLOGY:    Culture - Body Fluid with Gram Stain (collected 14 Jul 2020 00:32)  Source: .Body Fluid Abdominal Fluid  Gram Stain (14 Jul 2020 04:12):    polymorphonuclear leukocytes seen    No organisms seen    by cytocentrifuge    Culture - Blood (collected 12 Jul 2020 10:04)  Source: .Blood Blood-Peripheral  Preliminary Report (13 Jul 2020 11:01):    No growth to date.    Culture - Blood (collected 12 Jul 2020 10:04)  Source: .Blood Blood-Peripheral  Preliminary Report (13 Jul 2020 11:01):    No growth to date.    Culture - Urine (collected 11 Jul 2020 03:14)  Source: .Urine Catheterized  Final Report (11 Jul 2020 23:38):    No growth    Culture - Blood (collected 10 Jul 2020 00:22)  Source: .Blood Blood-Peripheral  Preliminary Report (11 Jul 2020 01:03):    No growth to date.    Culture - Blood (collected 10 Jul 2020 00:22)  Source: .Blood Blood-Peripheral  Enterococcus faecium (vancomycin resistant) (12 Jul 2020 11:59)  Organism: Enterococcus faecium (vancomycin resistant) (12 Jul 2020 11:59)      -  Ampicillin: R >8 Predicts results to ampicillin/sulbactam, amoxacillin-clavulanate and  piperacillin-tazobactam.      -  Daptomycin: S 4      -  Gentamicin synergy: S      -  Linezolid: S 1      -  Streptomycin synergy: R      -  Vancomycin: R >16      Method Type: LEONARDO    CMV IgG Antibody: <0.20 U/mL (12-04-19 @ 08:33)  Toxoplasma IgG Screen: <3.0 IU/mL (12-04-19 @ 08:33)    RADIOLOGY:    <The imaging below has been reviewed and visualized by me independently. Findings as detailed in report below>    < from: US Duplex Arterial Lower Ext Ltd, Left (07.13.20 @ 16:25) >  INTERPRETATION:  CLINICAL INFORMATION: Evaluate fluid collection seen on CT. Left groin.  COMPARISON: CT dictated 7/11/2020.  TECHNIQUE: Sonography of the abdomen.   FINDINGS:  There is a 9.2 x 3.6 x 5.7 cm anechoic fluid collection with some complexity and no color-flow on Doppler.   IMPRESSION:   There is a 9 cm mildly complex, anechoic fluid collection that likely represents a seroma or lymphocele.  < end of copied text >    OTHER TESTS:  COVID-19 PCR: NotDetec (07-09-20 @ 14:59)  COVID-19 PCR: NotDetec (06-11-20 @ 14:52)    Procalcitonin, Serum: 0.68 (07-14 @ 07:05)  Procalcitonin, Serum: 0.87 (07-13 @ 05:25)  Procalcitonin, Serum: 1.10 (07-09 @ 18:15)  Procalcitonin, Serum: 0.13 (06-24 @ 03:24)  Procalcitonin, Serum: 0.12 (06-21 @ 20:24)

## 2020-07-14 NOTE — PROGRESS NOTE ADULT - ATTENDING COMMENTS
64M PMH JEAN cirrhosis s/p OLT on 7/2/20  Post-Op Course Complicated by Encephalopathy and Tremors  Blood Cultures (7/10) with VRE  CT C/A/P without obvious intraabdominal source.  Hypoxia prior to positive BCx which may be related to sepsis   Had L groin fluid collection which appears to be consistent with seroma - sample obtained for culture  Would stop Meropenem  Would continue Linezolid  Would obtain routine TTE given VRE Bacteremia    I will continue to follow. Please feel free to contact me with any further questions.    Eusebio Pérez M.D.  [facility] Division of Infectious Disease  8AM-5PM: Pager Number [phone]  After Hours (or if no response): Please contact the Infectious Diseases Office at [phone]     The above assessment and plan were discussed with Dr Stef Guardado, Dr Letitia Mo

## 2020-07-14 NOTE — PROGRESS NOTE ADULT - ASSESSMENT
64 M hx of DM, HLD, GERD, HFpEF with mild LV diastolic dysfunction, and decompensated JEAN cirrhosis c/b ascites with hx of SBP, HE, duodenal ulcer, GAVE and duodenal AVM s/p APC (last on 10/11/19), UNOS listed for liver transplantation at [facility]. 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 He is now re-admitted with hepatic encephalopathy and VRE UTI, also with MISA on CKD. S/p liver transplant 7/2.    Impression:  #S/p liver transplant 7/2: liver enzymes continue to downtrend  OR details:  - L groin cutdown for vv bypass   - anastomosis: bicaval end-end/CBD duct-duct/HA & PV end-end, all standard anatomy.    - IOC with good flow  - Simulect induction. 500mg Solumedrol  - JPs x3 with T-Tube  - 14U pRBC, 14U FFP, 10 PLT, 11 CRYO, 500mL returned from cell saver, EBL 5L, UOP 1100mL  Recipient Info:   ABO: A  CMV:  Neg  EBV Positive  Donor:  Donor ID:  [id number] Match: [id number]  Age: 29 ABO:  A1 High Risk:   No COD: Cardiovascular  Anti CMV positive, EBV IgG- positive  HepBcAb-neg, Hepatitis C-DANA- neg, Hepatitis C ab-neg  # Elevated liver enzymes: possible secondary to DILI vs sepsis related. Now with VRE and on linezolid and meropenem. Enzymes improved today.  # VRE on culture from 7/10: on mari and linezolid. CT showing groin hematoma, s/p aspiration and culture pending.  #Tremors: improved but ongoign symptoms c/f taco toxicity +/- prednisone and opioid effects. May need medication change based on clinical course  #Halluciniations/agitation: resolved hallucinations but still agitated c/f prednisone toxicity. Improved overall with tapering dose  #GI bleed - Hgb stable. No overt bleeding. 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 GAVE and PHG should have improved w/ liver transplant, most likely etiology is NG tube trauma prior if any c/f GI bleed  # Abnormal Chest X-Ray c/f evolving pna vs atelectasis: now on meropenem  # Hypertension on nifedipine now. BP improved  # Lower ext edema: diuresing, improving on lasix  #R posterior heel wound w/ overlying eschar – on meropenem for 1 more day. Local wound care. no signs of infections, XR neg for gas, evaluated by podiatry and ID. MRI w/o contrast limited, but no overt signs of active infection.  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   # Hyperglycemia with episodes of overcorrection    Recommendation:  - meropenem/linezolid per transplant ID; plan to d/c meropenem pending groin aspirate cx  - continue with standing magnesium  - diuresis with Lasix 80 mg BID  - monitor renal function  - prednisone 5 mg po daily  - CellCept on hold given bacteremia  - continue with tacro dosing per transplant surgery  - consider switch to CsA given ongoing neurotoxicity from tacro  - glycemic control with insulin, monitor for hypoglycemia  - c/w nystatin, Bactrim, valcyte ppx  - psychosis management per primary team  - trend Hb- PO PPI BID, monitor bowel movements  - aspirin per surgery  - advance diet, low Na diet  - wound care/nursing for R heel ulcer  - agitation precaution  - f/u transplant surgery recs  - rest of care per SICU team  - transplant hepatology will follow

## 2020-07-14 NOTE — PROGRESS NOTE ADULT - PROBLEM SELECTOR PLAN 3
-defer statin in the setting of recent liver x-plant    discussed w/pt and team  pager: [phone]   office: [phone]    -I spent a total time of 25 mins with the patient at bedside/on unit of which more than 50% of time was spent on counseling/coordination of care.

## 2020-07-14 NOTE — PROGRESS NOTE ADULT - ASSESSMENT
ASSESSMENT:  64y Male c/b small esophageal varices (12/2019), portal hypertensive gastropathy, ascites, hx SBP, hx hepatic encephalopathy, GAVE syndrome s/p APC, hx duodenal ulcer (5/2019), HTN, HLD, DM, HFpEF (EF 70%), recent ESBL E coli prostate abscess (3/2020 s/p 4 weeks ertapenem), orthostatic hypotension ( on midodrine) who was initially admitted to medicine for hepatic encephalopathy. Hospital course c/b MISA, VRE UTI, acute blood loss anemia, received blood transfusion, EGD done 6/22 with Grade II esophageal varices, acute gastritis with hemorrhage and acute duodenitis with hemorrhage. Patient had worsening mental status, concerning decompensating liver cirrhosis, hepatic encephalopathy patient then transferred to SICU for further care. He is now s/p OLT complicated by delirium bacteremia, hyper/hypoglycemia.    PLAN:   Neurologic: tremors, hallucinations, confusion 2/2 ?tacro toxicity  - Neuro status waxing and waning, worsening delirium today  - S/p 50mg seroquel and 15mg remeron 7/13 with little improvement  - Precedex for sedation    Respiratory: possible RLL PNA  - IS, OOBTC as tolerated  - C/w meropenem for possible RLL PNA  - Continuous pulse oximetry    Cardiovascular: Intermittent hypertension  - Nifedipine 30mg currently being held  - Labetalol PRN for SBP goal <145  - ASA  - Monitor hemodynamic status    Gastrointestinal/Nutrition: GIB, JEAN cirrhosis, GAVE syndrome. S/p liver transplant   - Regular, consistent carb diet. Tolerating well.  - Protonix 40mg BID  - C/w ursodiol  - Trend LFTs q12    Renal: urinary retention, hyponatremia (improving)  - C/w flomax for urinary retention, increased to 0.8  - Trend BMPs, replete lytes prn  - Beasley in place  - Lasix prn     Heme: coagulopathy  - Venodynes for mechanical VTE prophylaxis, holding chemical VTE prophylaxis in the setting of coagulopathy/ GIB  - Trend H/H, coags    Infectious Disease: immunosuppression possible RLL PNA  - VRE on 7/10 bcx, BCx 7/12 NGTD, UCx 7/11 NGTD  - Continue meropenem, linezolid  - Continue PPx as per transplant Bactrim, valcyte  - Continue immunosuppression as per transplant prednisone, tacro. Cellcept held  - CT C/A/P--> trace pleural effusion, groin hematoma s/p aspiration  - F/u groin aspirate Cx    Endo: DM type II  - ISS, lantus 16u qhs, 5u premeal/qhs insulin  - Monitor FS premeal and qhs    Disposition:  - SICU full code

## 2020-07-14 NOTE — PROGRESS NOTE ADULT - SUBJECTIVE AND OBJECTIVE BOX
Transplant Surgery Progress Note    Transplant Surgery - Multidisciplinary Rounds  --------------------------------------------------------------  OLTx      Date:  7/2/2020       POD#12    Present: Patient seen with multidisciplinary team including Transplant Surgeon: Dr. Alonzo, Transplant Nephrologist Dr. Salvador, OLY Bingham, NP Xavi during am rounds and examined with Dr. Alonzo. Disciplines not in attendance will be notified of the plan.     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     Re-admitted on 6/11:   ·	worsening HE  ·	UTI/VRE: tx with linezolid 6/15-22, bactrim DS 6/22 - 7/2  ·	MISA/Hyponatremia  ·	UGI bleed (melena) s/p EGD (6/22) c/w hemorrhagic duodenitis/gastritis; Grade 2 EV; requiring multiple transfusions  ·	Known h/o R foot ulcer (MRI neg for OM)  ·	s/p OLTx on 7/2/2020, post op liver doppler with patent vessels  ·	post op course c/b delirium and VRE bacteremia (7/10)     Interval events:  - POD 12 s/p OLT with good graft function; started Lester 300mg bid yesterday;  LFTs trending down: ALk Phos 199 (240), AST 36 (71), ALT 90 (119), T bili 1.2   - Received Seroquel and Remeron last night; remained agitated/confused, started on Precedex gtt, off this am   - This morning, mental status better, AO x 3, following commands,   - Drains: Ramos, ANDREINA removed yesterday (dislodged)   - Abd US yest showed 9cm Left groin collection; aspirated - cx pending     MEDICATIONS  (STANDING):  aspirin enteric coated 81 milliGRAM(s) Oral daily  chlorhexidine 2% Cloths 1 Application(s) Topical <User Schedule>  collagenase Ointment 1 Application(s) Topical daily  insulin glargine Injectable (LANTUS) 16 Unit(s) SubCutaneous at bedtime  insulin lispro (HumaLOG) corrective regimen sliding scale   SubCutaneous three times a day before meals  insulin lispro (HumaLOG) corrective regimen sliding scale   SubCutaneous at bedtime  linezolid  IVPB 600 milliGRAM(s) IV Intermittent every 12 hours  magnesium oxide 800 milliGRAM(s) Oral two times a day with meals  meropenem  IVPB 1000 milliGRAM(s) IV Intermittent every 8 hours  nystatin    Suspension 189436 Unit(s) Swish and Swallow four times a day  nystatin Cream 1 Application(s) Topical two times a day  pantoprazole    Tablet 40 milliGRAM(s) Oral before breakfast  predniSONE   Tablet 5 milliGRAM(s) Oral daily  tacrolimus 3 milliGRAM(s) Oral <User Schedule>  tamsulosin 0.8 milliGRAM(s) Oral at bedtime  trimethoprim   80 mG/sulfamethoxazole 400 mG 1 Tablet(s) Oral daily  ursodiol Capsule 300 milliGRAM(s) Oral two times a day  valGANciclovir 450 milliGRAM(s) Oral daily    PAST MEDICAL & SURGICAL HISTORY:  GIB (gastrointestinal bleeding)  GERD with esophagitis: Gastritis &amp; Non Bleeding Ulcers  Hepatic encephalopathy  Obesity  Fatty liver disease, nonalcoholic  Renal stones: 25 years ago  Hypertension  Neuropathy  Hypercholesteremia  Diabetes  S/P cholecystectomy      Vital Signs Last 24 Hrs  T(C): 36.1 (14 Jul 2020 07:00), Max: 36.8 (13 Jul 2020 19:00)  T(F): 97 (14 Jul 2020 07:00), Max: 98.2 (13 Jul 2020 19:00)  HR: 81 (14 Jul 2020 09:00) (57 - 81)  BP: 94/64 (14 Jul 2020 09:00) (92/53 - 184/123)  BP(mean): 74 (14 Jul 2020 09:00) (67 - 147)  RR: 18 (14 Jul 2020 09:00) (10 - 32)  SpO2: 98% (14 Jul 2020 09:00) (94% - 100%)    I&O's Summary    13 Jul 2020 07:01 - 14 Jul 2020 07:00  --------------------------------------------------------  IN: 1563.6 mL / OUT: 5590 mL / NET: -4026.4 mL    14 Jul 2020 07:01  -  14 Jul 2020 10:43  --------------------------------------------------------  IN: 350 mL / OUT: 60 mL / NET: 290 mL                         10.2   4.68  )-----------( 136      ( 14 Jul 2020 07:05 )             31.3     07-14    138  |  99  |  27<H>  ----------------------------<  76  4.4   |  31  |  1.26    Ca    8.8      14 Jul 2020 07:05  Phos  3.8     07-14  Mg     2.0     07-14    TPro  5.3<L>  /  Alb  2.9<L>  /  TBili  1.2  /  DBili  0.6<H>  /  AST  36  /  ALT  90<H>  /  AlkPhos  199<H>  07-14    Tacrolimus (), Serum: 9.9 ng/mL (07-14 @ 09:00)    cxs:   Culture - Body Fluid with Gram Stain (collected 07-14-20 @ 00:32)  Source: .Body Fluid Abdominal Fluid  Gram Stain (07-14-20 @ 04:12):    polymorphonuclear leukocytes seen    No organisms seen    by cytocentrifuge    Culture - Blood (collected 07-12-20 @ 10:04)  Source: .Blood Blood-Peripheral  Preliminary Report (07-13-20 @ 11:01):    No growth to date.    Culture - Blood (collected 07-12-20 @ 10:04)  Source: .Blood Blood-Peripheral  Preliminary Report (07-13-20 @ 11:01):    No growth to date.    Culture - Urine (collected 07-11-20 @ 03:14)  Source: .Urine Catheterized  Final Report (07-11-20 @ 23:38):    No growth    Culture - Blood (collected 07-10-20 @ 00:22)  Source: .Blood Blood-Peripheral  Preliminary Report (07-11-20 @ 01:03):    No growth to date.    Culture - Blood (collected 07-10-20 @ 00:22)  Source: .Blood Blood-Peripheral  Gram Stain (07-10-20 @ 17:53):    Growth in aerobic bottle: Gram positive cocci in pairs  Final Report (07-12-20 @ 11:59):    Growth in aerobic bottle: Enterococcus faecium (vancomycin resistant)    "Due to technical problems, Proteus sp. will Not be reported as part of    the BCID panel until further notice"    ***Blood Panel PCR results on this specimen are available    approximately 3 hours after the Gram stain result.***    Gram stain, PCR, and/or culture results may not always    correspond due to difference in methodologies.    ************************************************************    This PCR assay was performed using [employer].    The following targets are tested for: Enterococcus,    vancomycin resistant enterococci, Listeria monocytogenes,    coagulase negative staphylococci, S. aureus,    methicillin resistant S. aureus, Streptococcus agalactiae    (Group B), S. pneumoniae, S. pyogenes (Group A),    Acinetobacter baumannii, Enterobacter cloacae, E. coli,    Klebsiella oxytoca, K. pneumoniae, Proteus sp.,    Serratia marcescens, Haemophilus influenzae,    Neisseria meningitidis, Pseudomonas aeruginosa, Candida    albicans, C. glabrata, C krusei, C parapsilosis,    C. tropicalis and the KPC resistance gene.  Organism: Blood Culture PCR  Enterococcus faecium (vancomycin resistant) (07-12-20 @ 11:59)  Organism: Enterococcus faecium (vancomycin resistant) (07-12-20 @ 11:59)  Organism: Blood Culture PCR (07-12-20 @ 11:59)          Review of systems  Gen: confused  Skin: No rashes  Head/Eyes/Ears/Mouth: No headache; Normal hearing; Normal vision w/o blurriness; No sinus pain/discomfort, sore throat  Respiratory: No dyspnea, cough, wheezing, hemoptysis  CV: No chest pain, PND, orthopnea  GI: Mild abdominal pain at surgical incision site; denies diarrhea, constipation, nausea, vomiting, melena, hematochezia  : No increased frequency, dysuria, hematuria, nocturia  MSK: No joint pain/swelling; no back pain; no edema  Neuro: No dizziness/lightheadedness, weakness, seizures, numbness, tingling  Psych: No significant nervousness, anxiety, stress, depression  All other systems were reviewed and are negative, except as noted.      Physical exam:  Constitutional: NAD  Eyes: ROHAN  ENMT: NC/AT  Neck: supple  Respiratory: CTA b/l  Cardiovascular: RRR  Gastrointestinal: Soft abdomen, ND, mild incisional tenderness, incision intact with staples, T-tube clamped    Genitourinary: bob   Extremities: SCD's in place and working bilaterally. L groin cutdown site c/d/i  Vascular: Palpable dp pulses bilaterally  Neurological: AO x 2; upper body tremors still present, improved   Skin: no rashes, ulcerations,  Psychiatric: responsive

## 2020-07-14 NOTE — PROGRESS NOTE ADULT - ATTENDING COMMENTS
S/p renal transplant, recovering well  Slept well on Precedex drip last night, more oriented, less delirious, continue nocturnal Remeron to facilitate sleep  Hemodynamically stable saturating well on RA, refusion NIV  PO, hepatic function improved  Daily Protonix, HCT stable  Abx Zyvox and Meropenem, repeat cultures including culture from  groin collection neg so far, afebrile, no leucocytosis  ISS/Lantus dose adjustment for borderline hypoglycemia  Transplant meds  PT/mobilization

## 2020-07-14 NOTE — PROGRESS NOTE ADULT - ASSESSMENT
63 yo M with hx of T2DM uncontrolled due to steroids x 10 years with neuropathy and R DFU of the heel, HTN, HLD, MGUS, and decompensated JEAN cirrhosis s/p liver x-plant on 7/2/2020 admitted 6/11/2020 with confusion secondary to liver failure and encephalopathy. Endocrine consulted for glycemic control in the setting of steroid use now on Prednisone 5mg daily.  Had hypoglycemia and lower insulin requirements in setting of erratic PO intake. BG goal (100-180mg/dl).

## 2020-07-14 NOTE — PROGRESS NOTE ADULT - ASSESSMENT
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    [] POD 12 s/p OLT   - Good graft function, LFTs trending down  - Liver doppler (7/12) with patent vessels  - Immuno: FK 3mg bid, MMF on hold in setting of infection, Pred 5mg daily  - PPX: valcyte/nystatin/bactrim  - Continue ASA 81mg daily  - LE edema: hold diuresis today (soft BP)  - h/o UGI bleed: Continue Protonix 40mg IV bid, stable H/H    - Cont Mag 800mg BID, keep Mag >2  - Regular, carbohydrate restricted diet  - Pain control PRN  - DVT PPx: SCDs at all times    [] VRE bacteremia (7/10)  - ID following: on meropenem/linezolid; plan to dc meropenem pending cx from L groin aspiration  - Bld cx from 7/12 with NGTD, L groin collection aspirated (7/13) - cx pending   - Repeat bld cxs with am labs    [] Hyperglycemia  - Appreciate endocrinology recommendations  - Cont Lantus and Humalog insulin wih SSI coverage  - Fingersticks ac and qhs    [] Heel ulcer  Continue local wound care w/ santyl and DSD as per podiatry    Continue ICU care

## 2020-07-14 NOTE — PROGRESS NOTE ADULT - ASSESSMENT
#PENDING RECS. 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 s/p treatment course for possible VRE UTI. Now s/p OLT on 7/2/20    Noted to have some drainage from right heel so danilo-op Dapto and Lachelle were extended  No drainage, erythema or underlying fluctuance was appreciated at the right heel  MRI prior to transplant without obvious infection    Now with worsening encephalopathy  Also with new hypoxia on 7/8>7/9  U/A without pyuria  COVID19 PCR Negative  CXR with RLL Infiltrate  ?Aspiration PNA    Blood cultures (7/10) with VRE in 1/2 Sets  CT Chest (7/11) without evidence of pneumonia (reviewed with CT Radiologist today, 7/13)  CT A/P (7/11) with no obvious intraabdominal pathology but with Partially imaged fluid collection in the left groin with overlying skin staples.    Favor targeted ultrasound and possible drainage of collection (if present) of L groin    RECOMMENDATIONS    #VRE Bacteremia, L Groin Collection, Encephalopathy  --Recommend US of left groin  --Continue Linezolid 600 mg IV Q12H  --Continue to follow CBC with diff  --Continue to follow temperature curve  --Follow up on preliminary blood cultures  --Continue monitoring tacrolimus levels     #Hypoxic Respiratory Failure, Encephalopathy  --Continue meropenem for now - pending US evaluation of left groin collection.   --No evidence of pneumonia so if collection not concerning for infection i.e. if seroma would discontinue the meropenem.    #s/p OLT   --Continue bactrim, valcyte for prophylaxis   --Monitor patient for tremors - monitor tacrolimus dose   --Removal of drains and lines as per surgical transplant team    #Right heel wound  --Podiatry on board    --Local wound care     Suyapa Mayer MD, PGY4  Fellow, Infectious Diseases  Pager: [phone]  If no response, after 5pm and on Weekends: Call [phone] #PENDING RECS. 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 s/p treatment course for possible VRE UTI. Now s/p OLT on 7/2/20    Noted to have some drainage from right heel so danilo-op Dapto and Lachelle were extended  No drainage, erythema or underlying fluctuance was appreciated at the right heel  MRI prior to transplant without obvious infection    Now with worsening encephalopathy  Also with new hypoxia on 7/8>7/9  U/A without pyuria  COVID19 PCR Negative  CXR with RLL Infiltrate  ?Aspiration PNA    Blood cultures (7/10) with VRE in 1/2 Sets  CT Chest (7/11) without evidence of pneumonia (reviewed with CT Radiologist today, 7/13)  CT A/P (7/11) with no obvious intraabdominal pathology but with Partially imaged fluid collection in the left groin with overlying skin staples.    Favor targeted ultrasound and possible drainage of collection (if present) of L groin: likely represents a seroma or lymphocele; aspirated - cx: PMN seen, no organism seen    RECOMMENDATIONS    #VRE Bacteremia, L Groin Collection, Encephalopathy  --Abd US showed 9cm Left groin collection: likely represents a seroma or lymphocele; aspirated - cx: PMN seen, no organism seen  --Continue Linezolid 600 mg IV Q12H  --Continue to follow CBC with diff  --Continue to follow temperature curve  --Follow up on preliminary blood cultures  --Continue monitoring tacrolimus levels    #Hypoxic Respiratory Failure, Encephalopathy  --Continue meropenem for now - pending US evaluation of left groin collection.  --No evidence of pneumonia so if collection not concerning for infection i.e. if seroma would discontinue the meropenem.    #s/p OLT   --Continue bactrim, valcyte for prophylaxis   --Monitor patient for tremors - monitor tacrolimus dose   --Removal of drains and lines as per surgical transplant team    #Right heel wound  --Podiatry on board    --Local wound care     Suyapa Mayer MD, PGY4  Fellow, Infectious Diseases  Pager: [phone]  If no response, after 5pm and on Weekends: Call [phone] #PENDING RECS. 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 s/p treatment course for possible VRE UTI. Now s/p OLT on 7/2/20    Noted to have some drainage from right heel so danilo-op Dapto and Lachelle were extended  No drainage, erythema or underlying fluctuance was appreciated at the right heel  MRI prior to transplant without obvious infection    Now with worsening encephalopathy  Also with new hypoxia on 7/8>7/9  U/A without pyuria  COVID19 PCR Negative  CXR with RLL Infiltrate  ?Aspiration PNA    Blood cultures (7/10) with VRE in 1/2 Sets  CT Chest (7/11) without evidence of pneumonia (reviewed with CT Radiologist today, 7/13)  CT A/P (7/11) with no obvious intraabdominal pathology but with Partially imaged fluid collection in the left groin with overlying skin staples.    Favor targeted ultrasound and possible drainage of collection (if present) of L groin: likely represents a seroma or lymphocele; aspirated - cx: PMN seen, no organism seen    RECOMMENDATIONS    #VRE Bacteremia, L Groin Collection, Encephalopathy  --Abd US showed 9cm Left groin collection: likely represents a seroma or lymphocele; aspirated - cx: PMN seen, no organism seen  --Continue Linezolid 600 mg IV Q12H  --Continue to follow CBC with diff  --Continue to follow temperature curve  --Follow up on preliminary blood cultures  --Continue monitoring tacrolimus levels    #Hypoxic Respiratory Failure, Encephalopathy  --Continue meropenem for now - pending US evaluation of left groin collection.  --No evidence of pneumonia so if collection not concerning for infection i.e. if seroma would discontinue the meropenem.    #s/p OLT   --Continue bactrim, valcyte for prophylaxis   --Monitor patient for tremors - monitor tacrolimus dose   --Removal of drains and lines as per surgical transplant team    #Right heel wound  --Podiatry on board    --Local wound care     Suyapa Mayer MD, PGY4  Fellow, Infectious Diseases  Pager: [phone]  If no response, after 5pm and on Weekends: Call [phone] #PENDING RECS. PLEASE WAIT FOR FINAL RECS AFTER DISCUSSION WITH ATTENDING#    ASSESSMENT:  65 y/o M PMHx decompensated JEAN Cirrhosis on transplant list, DMII, HFpEF, recent admission for ESBL E coli prostatic abscess 2/2 4 wks ertapenem, hx of ESBL UTIs, recent VRE urinary colonization came to hospital for worsening jaundice and episode of confusion, resolved PTA. s/p treatment course for possible VRE UTI. Now s/p OLT on 7/2/20    Noted to have some drainage from right heel so danilo-op Dapto and Lachelle were extended  No drainage, erythema or underlying fluctuance was appreciated at the right heel  MRI prior to transplant without obvious infection    Now with worsening encephalopathy  Also with new hypoxia on 7/8>7/9  U/A without pyuria  COVID19 PCR Negative  CXR with RLL Infiltrate  ?Aspiration PNA    Blood cultures (7/10) with VRE in 1/2 Sets  CT Chest (7/11) without evidence of pneumonia (reviewed with CT Radiologist today, 7/13)  CT A/P (7/11) with no obvious intraabdominal pathology but with Partially imaged fluid collection in the left groin with overlying skin staples.    Favor targeted ultrasound and possible drainage of collection (if present) of L groin: likely represents a seroma or lymphocele; aspirated - cx: PMN seen, no organism seen    RECOMMENDATIONS    #VRE Bacteremia, L Groin Collection, Encephalopathy  --Abd US showed 9cm Left groin collection: likely represents a seroma or lymphocele; aspirated - cx: PMN seen, no organism seen  --Continue Linezolid 600 mg IV Q12H  --Continue to follow CBC with diff  --Continue to follow temperature curve  --Follow up on preliminary blood cultures  --Continue monitoring tacrolimus levels    #Hypoxic Respiratory Failure, Encephalopathy  --Continue meropenem for now - pending US evaluation of left groin collection.  --No evidence of pneumonia so if collection not concerning for infection  --Can discontinue Meropenem    #s/p OLT   --Continue bactrim, valcyte for prophylaxis   --Monitor patient for tremors - monitor tacrolimus dose   --Removal of drains and lines as per surgical transplant team    #Right heel wound  --Podiatry on board    --Local wound care     Suyapa Mayer MD, PGY4  Fellow, Infectious Diseases  Pager: [phone]  If no response, after 5pm and on Weekends: Call [phone]    d/w Dr. 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 s/p treatment course for possible VRE UTI. Now s/p OLT on 7/2/20    Noted to have some drainage from right heel so danilo-op Dapto and Lachelle were extended  No drainage, erythema or underlying fluctuance was appreciated at the right heel  MRI prior to transplant without obvious infection    Now with worsening encephalopathy  Also with new hypoxia on 7/8>7/9  U/A without pyuria  COVID19 PCR Negative  CXR with RLL Infiltrate  ?Aspiration PNA    Blood cultures (7/10) with VRE in 1/2 Sets  CT Chest (7/11) without evidence of pneumonia (reviewed with CT Radiologist today, 7/13)  CT A/P (7/11) with no obvious intraabdominal pathology but with Partially imaged fluid collection in the left groin with overlying skin staples.    Favor targeted ultrasound and possible drainage of collection (if present) of L groin: likely represents a seroma or lymphocele; aspirated - cx: PMN seen, no organism seen    RECOMMENDATIONS    #VRE Bacteremia, L Groin Collection, Encephalopathy  --Abd US showed 9cm Left groin collection: likely represents a seroma or lymphocele; aspirated - cx: PMN seen, no organism seen  --Continue Linezolid 600 mg IV Q12H  --Continue to follow CBC with diff  --Continue to follow temperature curve  --Follow up on preliminary blood cultures  --Continue monitoring tacrolimus levels    #Hypoxic Respiratory Failure, Encephalopathy  --No evidence of pneumonia and collection in left groin appears consistent with seroma  --Can discontinue Meropenem    #s/p OLT   --Continue bactrim, valcyte for prophylaxis   --Monitor patient for tremors - monitor tacrolimus dose   --Removal of drains and lines as per surgical transplant team    #Right heel wound  --Podiatry on board    --Local wound care     Suyapa Mayer MD, PGY4  Fellow, Infectious Diseases  Pager: [phone]  If no response, after 5pm and on Weekends: Call [phone]    d/w Dr. Pérez

## 2020-07-15 DIAGNOSIS — F05 DELIRIUM DUE TO KNOWN PHYSIOLOGICAL CONDITION: ICD-10-CM

## 2020-07-15 LAB
ALBUMIN SERPL ELPH-MCNC: 2.9 G/DL — LOW (ref 3.3–5)
ALP SERPL-CCNC: 185 U/L — HIGH (ref 40–120)
ALT FLD-CCNC: 70 U/L — HIGH (ref 10–45)
AMMONIA BLD-MCNC: 20 UMOL/L — SIGNIFICANT CHANGE UP (ref 11–55)
ANION GAP SERPL CALC-SCNC: 7 MMOL/L — SIGNIFICANT CHANGE UP (ref 5–17)
APTT BLD: 29.7 SEC — SIGNIFICANT CHANGE UP (ref 27.5–35.5)
AST SERPL-CCNC: 24 U/L — SIGNIFICANT CHANGE UP (ref 10–40)
BILIRUB DIRECT SERPL-MCNC: 0.5 MG/DL — HIGH (ref 0–0.2)
BILIRUB INDIRECT FLD-MCNC: 0.8 MG/DL — SIGNIFICANT CHANGE UP (ref 0.2–1)
BILIRUB SERPL-MCNC: 1.3 MG/DL — HIGH (ref 0.2–1.2)
BUN SERPL-MCNC: 28 MG/DL — HIGH (ref 7–23)
CALCIUM SERPL-MCNC: 9.3 MG/DL — SIGNIFICANT CHANGE UP (ref 8.4–10.5)
CHLORIDE SERPL-SCNC: 99 MMOL/L — SIGNIFICANT CHANGE UP (ref 96–108)
CO2 SERPL-SCNC: 32 MMOL/L — HIGH (ref 22–31)
CREAT SERPL-MCNC: 1.3 MG/DL — SIGNIFICANT CHANGE UP (ref 0.5–1.3)
CULTURE RESULTS: SIGNIFICANT CHANGE UP
GLUCOSE BLDC GLUCOMTR-MCNC: 108 MG/DL — HIGH (ref 70–99)
GLUCOSE BLDC GLUCOMTR-MCNC: 195 MG/DL — HIGH (ref 70–99)
GLUCOSE BLDC GLUCOMTR-MCNC: 239 MG/DL — HIGH (ref 70–99)
GLUCOSE BLDC GLUCOMTR-MCNC: 249 MG/DL — HIGH (ref 70–99)
GLUCOSE SERPL-MCNC: 74 MG/DL — SIGNIFICANT CHANGE UP (ref 70–99)
HCT VFR BLD CALC: 32.4 % — LOW (ref 39–50)
HGB BLD-MCNC: 10.4 G/DL — LOW (ref 13–17)
INR BLD: 1.01 RATIO — SIGNIFICANT CHANGE UP (ref 0.88–1.16)
MAGNESIUM SERPL-MCNC: 2 MG/DL — SIGNIFICANT CHANGE UP (ref 1.6–2.6)
MCHC RBC-ENTMCNC: 31.8 PG — SIGNIFICANT CHANGE UP (ref 27–34)
MCHC RBC-ENTMCNC: 32.1 GM/DL — SIGNIFICANT CHANGE UP (ref 32–36)
MCV RBC AUTO: 99.1 FL — SIGNIFICANT CHANGE UP (ref 80–100)
NRBC # BLD: 0 /100 WBCS — SIGNIFICANT CHANGE UP (ref 0–0)
PHOSPHATE SERPL-MCNC: 3.4 MG/DL — SIGNIFICANT CHANGE UP (ref 2.5–4.5)
PLATELET # BLD AUTO: 149 K/UL — LOW (ref 150–400)
POTASSIUM SERPL-MCNC: 4.3 MMOL/L — SIGNIFICANT CHANGE UP (ref 3.5–5.3)
POTASSIUM SERPL-SCNC: 4.3 MMOL/L — SIGNIFICANT CHANGE UP (ref 3.5–5.3)
PROCALCITONIN SERPL-MCNC: 0.71 NG/ML — HIGH (ref 0.02–0.1)
PROT SERPL-MCNC: 5.7 G/DL — LOW (ref 6–8.3)
PROTHROM AB SERPL-ACNC: 12 SEC — SIGNIFICANT CHANGE UP (ref 10.6–13.6)
RBC # BLD: 3.27 M/UL — LOW (ref 4.2–5.8)
RBC # FLD: 19 % — HIGH (ref 10.3–14.5)
SODIUM SERPL-SCNC: 138 MMOL/L — SIGNIFICANT CHANGE UP (ref 135–145)
SPECIMEN SOURCE: SIGNIFICANT CHANGE UP
TACROLIMUS SERPL-MCNC: 9.7 NG/ML — SIGNIFICANT CHANGE UP
WBC # BLD: 4.31 K/UL — SIGNIFICANT CHANGE UP (ref 3.8–10.5)
WBC # FLD AUTO: 4.31 K/UL — SIGNIFICANT CHANGE UP (ref 3.8–10.5)

## 2020-07-15 PROCEDURE — 99233 SBSQ HOSP IP/OBS HIGH 50: CPT

## 2020-07-15 PROCEDURE — 99232 SBSQ HOSP IP/OBS MODERATE 35: CPT

## 2020-07-15 PROCEDURE — 93308 TTE F-UP OR LMTD: CPT | Mod: 26

## 2020-07-15 PROCEDURE — 99232 SBSQ HOSP IP/OBS MODERATE 35: CPT | Mod: GC

## 2020-07-15 PROCEDURE — 93321 DOPPLER ECHO F-UP/LMTD STD: CPT | Mod: 26

## 2020-07-15 PROCEDURE — 90792 PSYCH DIAG EVAL W/MED SRVCS: CPT

## 2020-07-15 RX ORDER — MYCOPHENOLATE MOFETIL 250 MG/1
250 CAPSULE ORAL ONCE
Refills: 0 | Status: COMPLETED | OUTPATIENT
Start: 2020-07-15 | End: 2020-07-15

## 2020-07-15 RX ORDER — FUROSEMIDE 40 MG
80 TABLET ORAL ONCE
Refills: 0 | Status: COMPLETED | OUTPATIENT
Start: 2020-07-15 | End: 2020-07-15

## 2020-07-15 RX ORDER — LANOLIN ALCOHOL/MO/W.PET/CERES
3 CREAM (GRAM) TOPICAL AT BEDTIME
Refills: 0 | Status: DISCONTINUED | OUTPATIENT
Start: 2020-07-15 | End: 2020-07-17

## 2020-07-15 RX ORDER — MYCOPHENOLATE MOFETIL 250 MG/1
500 CAPSULE ORAL
Refills: 0 | Status: DISCONTINUED | OUTPATIENT
Start: 2020-07-15 | End: 2020-07-15

## 2020-07-15 RX ORDER — MYCOPHENOLATE MOFETIL 250 MG/1
500 CAPSULE ORAL
Refills: 0 | Status: DISCONTINUED | OUTPATIENT
Start: 2020-07-15 | End: 2020-07-16

## 2020-07-15 RX ORDER — INSULIN GLARGINE 100 [IU]/ML
10 INJECTION, SOLUTION SUBCUTANEOUS AT BEDTIME
Refills: 0 | Status: DISCONTINUED | OUTPATIENT
Start: 2020-07-15 | End: 2020-07-23

## 2020-07-15 RX ORDER — MIRTAZAPINE 45 MG/1
30 TABLET, ORALLY DISINTEGRATING ORAL AT BEDTIME
Refills: 0 | Status: DISCONTINUED | OUTPATIENT
Start: 2020-07-15 | End: 2020-07-17

## 2020-07-15 RX ORDER — INSULIN LISPRO 100/ML
4 VIAL (ML) SUBCUTANEOUS
Refills: 0 | Status: DISCONTINUED | OUTPATIENT
Start: 2020-07-15 | End: 2020-07-16

## 2020-07-15 RX ORDER — MYCOPHENOLATE MOFETIL 250 MG/1
250 CAPSULE ORAL
Refills: 0 | Status: DISCONTINUED | OUTPATIENT
Start: 2020-07-15 | End: 2020-07-15

## 2020-07-15 RX ADMIN — Medication 4 UNIT(S): at 17:40

## 2020-07-15 RX ADMIN — Medication 3 MILLIGRAM(S): at 21:44

## 2020-07-15 RX ADMIN — Medication 100000 UNIT(S): at 11:21

## 2020-07-15 RX ADMIN — URSODIOL 300 MILLIGRAM(S): 250 TABLET, FILM COATED ORAL at 06:05

## 2020-07-15 RX ADMIN — Medication 100000 UNIT(S): at 06:04

## 2020-07-15 RX ADMIN — MYCOPHENOLATE MOFETIL 500 MILLIGRAM(S): 250 CAPSULE ORAL at 19:36

## 2020-07-15 RX ADMIN — Medication 4: at 12:41

## 2020-07-15 RX ADMIN — PANTOPRAZOLE SODIUM 40 MILLIGRAM(S): 20 TABLET, DELAYED RELEASE ORAL at 06:05

## 2020-07-15 RX ADMIN — CHLORHEXIDINE GLUCONATE 1 APPLICATION(S): 213 SOLUTION TOPICAL at 06:05

## 2020-07-15 RX ADMIN — INSULIN GLARGINE 10 UNIT(S): 100 INJECTION, SOLUTION SUBCUTANEOUS at 21:52

## 2020-07-15 RX ADMIN — MYCOPHENOLATE MOFETIL 250 MILLIGRAM(S): 250 CAPSULE ORAL at 09:28

## 2020-07-15 RX ADMIN — Medication 100000 UNIT(S): at 17:03

## 2020-07-15 RX ADMIN — VALGANCICLOVIR 450 MILLIGRAM(S): 450 TABLET, FILM COATED ORAL at 11:20

## 2020-07-15 RX ADMIN — MYCOPHENOLATE MOFETIL 250 MILLIGRAM(S): 250 CAPSULE ORAL at 10:20

## 2020-07-15 RX ADMIN — NYSTATIN CREAM 1 APPLICATION(S): 100000 CREAM TOPICAL at 06:05

## 2020-07-15 RX ADMIN — MIRTAZAPINE 30 MILLIGRAM(S): 45 TABLET, ORALLY DISINTEGRATING ORAL at 21:44

## 2020-07-15 RX ADMIN — Medication 4 UNIT(S): at 12:42

## 2020-07-15 RX ADMIN — URSODIOL 300 MILLIGRAM(S): 250 TABLET, FILM COATED ORAL at 17:03

## 2020-07-15 RX ADMIN — Medication 1 APPLICATION(S): at 11:21

## 2020-07-15 RX ADMIN — Medication 1 TABLET(S): at 11:20

## 2020-07-15 RX ADMIN — TAMSULOSIN HYDROCHLORIDE 0.8 MILLIGRAM(S): 0.4 CAPSULE ORAL at 21:44

## 2020-07-15 RX ADMIN — TACROLIMUS 3 MILLIGRAM(S): 5 CAPSULE ORAL at 07:57

## 2020-07-15 RX ADMIN — Medication 80 MILLIGRAM(S): at 09:28

## 2020-07-15 RX ADMIN — TACROLIMUS 3 MILLIGRAM(S): 5 CAPSULE ORAL at 19:36

## 2020-07-15 RX ADMIN — Medication 100000 UNIT(S): at 00:57

## 2020-07-15 RX ADMIN — NYSTATIN CREAM 1 APPLICATION(S): 100000 CREAM TOPICAL at 17:04

## 2020-07-15 RX ADMIN — Medication 81 MILLIGRAM(S): at 11:20

## 2020-07-15 RX ADMIN — Medication 4: at 17:39

## 2020-07-15 NOTE — PROGRESS NOTE ADULT - ATTENDING COMMENTS
64M PMH JEAN cirrhosis s/p OLT on 7/2/20  Post-Op Course Complicated by Encephalopathy and Tremors  Blood Cultures (7/10) with VRE  CT C/A/P without obvious intraabdominal source.  Hypoxia prior to positive BCx which may be related to sepsis   Had L groin fluid collection which appears to be consistent with seroma - sample obtained for culture  Would continue Linezolid  Would obtain routine TTE given VRE Bacteremia    Would stop Meropenem - in any case today is day 7 so he completed an empiric PNA treatment course    I will continue to follow. Please feel free to contact me with any further questions.    Eusebio Pérez M.D.  [facility] Division of Infectious Disease  8AM-5PM: Pager Number [phone]  After Hours (or if no response): Please contact the Infectious Diseases Office at [phone]     The above assessment and plan were discussed with Flora, Transplant Surgery NP

## 2020-07-15 NOTE — PROGRESS NOTE ADULT - ASSESSMENT
64y Male c/b small esophageal varices (12/2019), portal hypertensive gastropathy, ascites, hx SBP, hx hepatic encephalopathy, GAVE syndrome s/p APC, hx duodenal ulcer (5/2019), HTN, HLD, DM, HFpEF (EF 70%), recent ESBL E coli prostate abscess (3/2020 s/p 4 weeks ertapenem), orthostatic hypotension ( on midodrine) who was initially admitted to medicine for hepatic encephalopathy. Hospital course c/b MISA, VRE UTI, acute blood loss anemia, received blood transfusion, EGD done 6/22 with Grade II esophageal varices, acute gastritis with hemorrhage and acute duodenitis with hemorrhage. Patient had worsening mental status, concerning decompensating liver cirrhosis, hepatic encephalopathy patient then transferred to SICU for further care. He is now s/p OLT complicated by delirium bacteremia, hyper/hypoglycemia.    PLAN:   Neurologic: tremors, hallucinations, confusion 2/2 ?tacro toxicity  - Neuro status waxing and waning, woke up without delirium on 7/14 after sleeping the night before (w precedex)  - 30mg remeron qhs + precedex gtt for sleep aid    Respiratory: possible RLL PNA  - IS, OOBTC as tolerated  - C/w meropenem for possible RLL PNA  - Continuous pulse oximetry    Cardiovascular: Intermittent hypertension  - Nifedipine 30mg, currently being held  - Labetalol PRN for SBP goal <145  - ASA  - Monitor hemodynamic status    Gastrointestinal/Nutrition: GIB, JEAN cirrhosis, GAVE syndrome. S/p liver transplant   - Regular, consistent carb diet. Tolerating well.  - C/w protonix 40mg PO daily  - C/w ursodiol    Renal: urinary retention, hyponatremia (improving)  - C/w flomax for urinary retention, increased to 0.8  - Trend BMPs, replete lytes prn  - Beasley in place    Heme: coagulopathy  - Venodynes for mechanical VTE prophylaxis, holding chemical VTE prophylaxis in the setting of coagulopathy/ GIB  - Trend H/H, coags    Infectious Disease: immunosuppression possible RLL PNA  - VRE on 7/10 Bcx, BCx 7/12 NGTD, UCx 7/11 NGTD  - Continue meropenem, linezolid  - Continue PPx as per transplant Bactrim, valcyte  - Continue immunosuppression as per transplant prednisone, tacro. Cellcept held.  - CT C/A/P--> trace pleural effusion, groin hematoma s/p aspiration  - F/u groin aspirate Cx    Endo: DM type II  - ISS, lantus 13u qhs  - Monitor FS premeal and qhs    Disposition:  - SICU full code

## 2020-07-15 NOTE — BEHAVIORAL HEALTH ASSESSMENT NOTE - NSBHCONSULTRECOMMENDOTHER_PSY_A_CORE FT
1. Minimize use of benzos, opioids, anticholinergics, or other deliriogenic agents when possible.  Maintain sleep wake cycle.  Provide frequent reorientation and redirection.  Family member at bedside if possible. Assess for need for glasses and hearing aid (if applicable).    2. Pt does not meet criteria for psychiatric hospitalization.  Recommend outpt psych f/u at [facility] after d/c- [phone].   [facility] Psych will follow.

## 2020-07-15 NOTE — BEHAVIORAL HEALTH ASSESSMENT NOTE - NSBHCHARTREVIEWVS_PSY_A_CORE FT
Vital Signs Last 24 Hrs  T(C): 37 (15 Jul 2020 11:00), Max: 37.1 (14 Jul 2020 15:00)  T(F): 98.6 (15 Jul 2020 11:00), Max: 98.8 (14 Jul 2020 15:00)  HR: 87 (15 Jul 2020 14:00) (57 - 90)  BP: 101/56 (15 Jul 2020 14:00) (100/71 - 147/69)  BP(mean): 74 (15 Jul 2020 14:00) (74 - 96)  RR: 17 (15 Jul 2020 14:00) (10 - 36)  SpO2: 98% (15 Jul 2020 14:00) (97% - 100%)

## 2020-07-15 NOTE — PROGRESS NOTE ADULT - SUBJECTIVE AND OBJECTIVE BOX
INTERVAL HPI/OVERNIGHT EVENTS:    events noted  remeron dose increased, precedex given overnight; d/c'd in the AM  more alert, awake today  still tremulous but continues to improve         MEDICATIONS  (STANDING):  aspirin enteric coated 81 milliGRAM(s) Oral daily  chlorhexidine 2% Cloths 1 Application(s) Topical <User Schedule>  collagenase Ointment 1 Application(s) Topical daily  furosemide    Tablet 80 milliGRAM(s) Oral every 8 hours  insulin glargine Injectable (LANTUS) 16 Unit(s) SubCutaneous at bedtime  insulin lispro (HumaLOG) corrective regimen sliding scale   SubCutaneous three times a day before meals  insulin lispro (HumaLOG) corrective regimen sliding scale   SubCutaneous at bedtime  insulin lispro Injectable (HumaLOG) 10 Unit(s) SubCutaneous before dinner  insulin lispro Injectable (HumaLOG) 8 Unit(s) SubCutaneous before breakfast  insulin lispro Injectable (HumaLOG) 8 Unit(s) SubCutaneous before lunch  linezolid  IVPB 600 milliGRAM(s) IV Intermittent every 12 hours  magnesium oxide 800 milliGRAM(s) Oral two times a day with meals  meropenem  IVPB 1000 milliGRAM(s) IV Intermittent every 8 hours  nystatin    Suspension 232320 Unit(s) Swish and Swallow four times a day  nystatin Cream 1 Application(s) Topical two times a day  pantoprazole  Injectable 40 milliGRAM(s) IV Push every 12 hours  polyethylene glycol 3350 17 Gram(s) Oral daily  predniSONE   Tablet 5 milliGRAM(s) Oral daily  senna 2 Tablet(s) Oral at bedtime  tacrolimus 3 milliGRAM(s) Oral <User Schedule>  tacrolimus 2 milliGRAM(s) Oral <User Schedule>  tamsulosin 0.8 milliGRAM(s) Oral at bedtime  trimethoprim   80 mG/sulfamethoxazole 400 mG 1 Tablet(s) Oral daily  valGANciclovir 450 milliGRAM(s) Oral daily    MEDICATIONS  (PRN):  glucagon  Injectable 1 milliGRAM(s) IntraMuscular once PRN Glucose <70 milliGRAM(s)/deciLiter      Allergies    codeine (Anaphylaxis)    Intolerances        Review of Systems:    General:  No wt loss, fevers, chills, night sweats,fatigue,   Eyes:  Good vision, no reported pain  ENT:  No sore throat, pain, runny nose, dysphagia  CV:  No pain, palpitatioins, hypo/hypertension  Resp:  No dyspnea, cough, tachypnea, wheezing  GI:  No pain, No nausea, No vomiting, No diarrhea, No constipatiion, No weight loss, No fever, No pruritis, No rectal bleeding, No tarry stools, No dysphagia,  :  No pain, bleeding, incontinence, nocturia  Muscle:  No pain, weakness  Neuro:  No weakness, tingling, memory problems  Psych:  No fatigue, insomnia, mood problems, depression  Endocrine:  No polyuria, polydypsia, cold/heat intolerance  Heme:  No petechiae, ecchymosis, easy bruisability  Skin:  No rash, tattoos, scars, edema      Vital Signs Last 24 Hrs  T(C): 36.3 (12 Jul 2020 15:00), Max: 36.8 (11 Jul 2020 19:00)  T(F): 97.3 (12 Jul 2020 15:00), Max: 98.2 (11 Jul 2020 19:00)  HR: 80 (12 Jul 2020 15:00) (72 - 83)  BP: 109/59 (12 Jul 2020 15:00) (94/55 - 154/68)  BP(mean): 78 (12 Jul 2020 15:00) (70 - 125)  RR: 15 (12 Jul 2020 15:00) (9 - 28)  SpO2: 96% (12 Jul 2020 15:00) (96% - 100%)    PHYSICAL EXAM:    Constitutional: NAD  HEENT: sclera anicteric   Neck: No LAD, supple  Gastrointestinal: BS+, soft, ND, +incisional tenderness, incisions c/d/i  Extremities: +b/l peripheral edema  Neurological: awake, alert, +tremors - improving     LABS:                        9.3    5.51  )-----------( 98       ( 12 Jul 2020 01:54 )             28.6     07-12    131<L>  |  95<L>  |  31<H>  ----------------------------<  217<H>  5.0   |  27  |  1.26    Ca    8.1<L>      12 Jul 2020 12:22  Phos  4.0     07-12  Mg     2.0     07-12    TPro  4.4<L>  /  Alb  2.6<L>  /  TBili  1.1  /  DBili  0.5<H>  /  AST  64<H>  /  ALT  98<H>  /  AlkPhos  212<H>  07-12    PT/INR - ( 12 Jul 2020 01:54 )   PT: 12.7 sec;   INR: 1.12 ratio         PTT - ( 12 Jul 2020 01:54 )  PTT:28.7 sec      RADIOLOGY & ADDITIONAL TESTS:

## 2020-07-15 NOTE — BEHAVIORAL HEALTH ASSESSMENT NOTE - RISK ASSESSMENT
Low Acute Suicide Risk Risk factors: acute/chronic cognitive impairments, visual disturbances, acute/chronic medical issues, not receiving treatment    Protective factors: no current SIIP/HIIP, no h/o SA/SIB, no h/o psych admissions, no access to weapons, no active substance abuse, with adult children, domiciled, intact marriage, social supports, positive therapeutic relationship, engaged in treatment, compliant with treatment, help-seeking behaviors    Overall, pt is a low risk of harm to self/others and does not require psychiatric admission for safety and stabilization.

## 2020-07-15 NOTE — BEHAVIORAL HEALTH ASSESSMENT NOTE - CASE SUMMARY
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 Hospital course c/b MISA, VRE UTI, acute blood loss anemia, received blood transfusion, EGD done 6/22 with Grade II esophageal varices, acute gastritis with hemorrhage and acute duodenitis with hemorrhage. Patient had worsening mental status, concerning decompensating liver cirrhosis, hepatic encephalopathy patient then transferred to SICU for further care. He is now s/p OLT  (orthotopic liver transplantation), complicated by delirium, bacteremia. Psychiatry was called for persistent delirium.  PT is more alert and oriented today but reportedly becomes confused at night. I agree with starting Remeron 15mg qhs and Seroquel 25mg po q6pm and melatonin to help with night time insomnia and agitation.

## 2020-07-15 NOTE — PROGRESS NOTE ADULT - PROBLEM SELECTOR PLAN 1
- s/p Liver transplant  - anti-rejection and post op prophylaxis per transplant hepatology  - diet as tolerated  - cont ICU care  - greatly appreciate transplant service care and efforts
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
## 2020-07-15 NOTE — PROGRESS NOTE ADULT - SUBJECTIVE AND OBJECTIVE BOX
Follow Up:      Interval History/ROS:Patient is a 64y old  Male who presents with a chief complaint of Liver failure, hepatic encephalopathy (15 Jul 2020 01:58)      Allergies  codeine (Anaphylaxis)    ANTIMICROBIALS:    linezolid  IVPB 600 every 12 hours 7/10-  meropenem  IVPB 1000 every 8 hours 7/2-  nystatin    Suspension [id number] four times a day 6/11-  trimethoprim   80 mG/sulfamethoxazole 400 mG 1 daily 6/11-  valGANciclovir 450 daily 7/3-    ANTIMICROBIALS (past 90 days):  MEDICATIONS  (STANDING):  s/p clarithromycin 6/30-7/1  s/p DAPTOmycin IVPB 7/3-4  s/p linezolid    Tablet 6/15-6/22  s/p metroNIDAZOLE  6/30-7/1  s/p rifAXIMin 6/11-7/1  s/p meropenem 6/12-6/15    OTHER MEDS: MEDICATIONS  (STANDING):  aspirin enteric coated 81 daily  insulin glargine Injectable (LANTUS) 13 at bedtime  insulin lispro (HumaLOG) corrective regimen sliding scale  three times a day before meals  insulin lispro (HumaLOG) corrective regimen sliding scale  at bedtime  mirtazapine 30 at bedtime  mycophenolate mofetil 500 <User Schedule>  pantoprazole    Tablet 40 before breakfast  predniSONE   Tablet 5 daily  tacrolimus 3 <User Schedule>  tamsulosin 0.8 at bedtime  ursodiol Capsule 300 two times a day    Vital Signs Last 24 Hrs  T(F): 98.6 (07-15-20 @ 07:00), Max: 98.8 (07-14-20 @ 15:00)    Vital Signs Last 24 Hrs  HR: 83 (07-15-20 @ 09:00) (57 - 90)  BP: 119/67 (07-15-20 @ 09:00) (100/71 - 131/59)  RR: 36 (07-15-20 @ 09:00)  SpO2: 100% (07-15-20 @ 09:00) (96% - 100%)  Wt(kg): --    EXAM:  Constitutional: Not in acute distress. Tremor+  Eyes: pupils bilaterally reactive to light. No icterus.  Oral cavity: Clear, no lesions  Neck: No neck vein distension noted  RS: Chest clear to auscultation bilaterally. No wheeze/rhonchi/crepitations.  CVS: S1, S2 heard. Regular rate and rhythm. No murmurs/rubs/gallops.  Abdomen: Liver transplant surgical scar seen, wound clean. Ascites drainage port seen, no fluid out. Left groin surgical scar seen, no collection seen.  : No acute abnormalities  Extremities: Warm. No pedal edema  Skin: No lesions noted  Vascular: No evidence of phlebitis  Neuro: A&O*3    Labs:                        10.4   4.31  )-----------( 149      ( 15 Jul 2020 07:28 )             32.4     07-15    138  |  99  |  28<H>  ----------------------------<  74  4.3   |  32<H>  |  1.30    Ca    9.3      15 Jul 2020 07:27  Phos  3.4     07-15  Mg     2.0     07-15    TPro  5.7<L>  /  Alb  2.9<L>  /  TBili  1.3<H>  /  DBili  0.5<H>  /  AST  24  /  ALT  70<H>  /  AlkPhos  185<H>  07-15    WBC Count: 4.31 (07-15-20 @ 07:28)  WBC Count: 4.68 (07-14-20 @ 07:05)  WBC Count: 4.58 (07-13-20 @ 05:25)    Creatinine, Serum: 1.30 (07-15)  Creatinine, Serum: 1.26 (07-14)  Creatinine, Serum: 1.19 (07-13)      MICROBIOLOGY:    Culture - Body Fluid with Gram Stain (collected 14 Jul 2020 00:32)  Source: .Body Fluid Abdominal Fluid  Gram Stain (14 Jul 2020 04:12):    polymorphonuclear leukocytes seen    No organisms seen    by cytocentrifuge  Preliminary Report (14 Jul 2020 22:08):    No growth    Culture - Blood (collected 12 Jul 2020 10:04)  Source: .Blood Blood-Peripheral  Preliminary Report (13 Jul 2020 11:01):    No growth to date.    Culture - Blood (collected 12 Jul 2020 10:04)  Source: .Blood Blood-Peripheral  Preliminary Report (13 Jul 2020 11:01):    No growth to date.    Culture - Blood (collected 10 Jul 2020 00:22)  Source: .Blood Blood-Peripheral  Enterococcus faecium (vancomycin resistant) (12 Jul 2020 11:59)  Organism: Enterococcus faecium (vancomycin resistant) (12 Jul 2020 11:59)      -  Ampicillin: R >8 Predicts results to ampicillin/sulbactam, amoxacillin-clavulanate and  piperacillin-tazobactam.      -  Daptomycin: S 4      -  Gentamicin synergy: S      -  Linezolid: S 1      -  Streptomycin synergy: R      -  Vancomycin: R >16      Method Type: LEONARDO    Culture - Urine (collected 11 Jul 2020 03:14)  Source: .Urine Catheterized  Final Report (11 Jul 2020 23:38):    No growth    RADIOLOGY:  imaging below personally reviewed    OTHER TESTS:  COVID-19 PCR: NotDetec (07-09-20 @ 14:59)    Procalcitonin, Serum: 0.71 (07-15 @ 07:27)  Procalcitonin, Serum: 0.68 (07-14 @ 07:05) Follow Up: VRE bacteremia and post-OLT    Interval History/ROS:Patient is a 64y old  Male who presents with a chief complaint of Liver failure, hepatic encephalopathy (15 Jul 2020 01:58)      Allergies  codeine (Anaphylaxis)    ANTIMICROBIALS:    linezolid  IVPB 600 every 12 hours 7/10-  meropenem  IVPB 1000 every 8 hours 7/2-  nystatin    Suspension [id number] four times a day 6/11-  trimethoprim   80 mG/sulfamethoxazole 400 mG 1 daily 6/11-  valGANciclovir 450 daily 7/3-    ANTIMICROBIALS (past 90 days):  MEDICATIONS  (STANDING):  s/p clarithromycin 6/30-7/1  s/p DAPTOmycin IVPB 7/3-4  s/p linezolid    Tablet 6/15-6/22  s/p metroNIDAZOLE  6/30-7/1  s/p rifAXIMin 6/11-7/1  s/p meropenem 6/12-6/15    OTHER MEDS: MEDICATIONS  (STANDING):  aspirin enteric coated 81 daily  insulin glargine Injectable (LANTUS) 13 at bedtime  insulin lispro (HumaLOG) corrective regimen sliding scale  three times a day before meals  insulin lispro (HumaLOG) corrective regimen sliding scale  at bedtime  mirtazapine 30 at bedtime  mycophenolate mofetil 500 <User Schedule>  pantoprazole    Tablet 40 before breakfast  predniSONE   Tablet 5 daily  tacrolimus 3 <User Schedule>  tamsulosin 0.8 at bedtime  ursodiol Capsule 300 two times a day    Vital Signs Last 24 Hrs  T(F): 98.6 (07-15-20 @ 07:00), Max: 98.8 (07-14-20 @ 15:00)    Vital Signs Last 24 Hrs  HR: 83 (07-15-20 @ 09:00) (57 - 90)  BP: 119/67 (07-15-20 @ 09:00) (100/71 - 131/59)  RR: 36 (07-15-20 @ 09:00)  SpO2: 100% (07-15-20 @ 09:00) (96% - 100%)  Wt(kg): --    EXAM:  Constitutional: Not in acute distress. Tremor+  Eyes: pupils bilaterally reactive to light. No icterus.  Oral cavity: Clear, no lesions  Neck: No neck vein distension noted  RS: Chest clear to auscultation bilaterally. No wheeze/rhonchi/crepitations.  CVS: S1, S2 heard. Regular rate and rhythm. No murmurs/rubs/gallops.  Abdomen: Liver transplant surgical scar seen, wound clean. Ascites drainage port seen, no fluid out. Left groin surgical scar seen, no collection seen.  : No acute abnormalities  Extremities: Warm. No pedal edema  Skin: No lesions noted  Vascular: No evidence of phlebitis  Neuro: A&O*3    Labs:                        10.4   4.31  )-----------( 149      ( 15 Jul 2020 07:28 )             32.4     07-15    138  |  99  |  28<H>  ----------------------------<  74  4.3   |  32<H>  |  1.30    Ca    9.3      15 Jul 2020 07:27  Phos  3.4     07-15  Mg     2.0     07-15    TPro  5.7<L>  /  Alb  2.9<L>  /  TBili  1.3<H>  /  DBili  0.5<H>  /  AST  24  /  ALT  70<H>  /  AlkPhos  185<H>  07-15    WBC Count: 4.31 (07-15-20 @ 07:28)  WBC Count: 4.68 (07-14-20 @ 07:05)  WBC Count: 4.58 (07-13-20 @ 05:25)    Creatinine, Serum: 1.30 (07-15)  Creatinine, Serum: 1.26 (07-14)  Creatinine, Serum: 1.19 (07-13)      MICROBIOLOGY:    Culture - Body Fluid with Gram Stain (collected 14 Jul 2020 00:32)  Source: .Body Fluid Abdominal Fluid  Gram Stain (14 Jul 2020 04:12):    polymorphonuclear leukocytes seen    No organisms seen    by cytocentrifuge  Preliminary Report (14 Jul 2020 22:08):    No growth    Culture - Blood (collected 12 Jul 2020 10:04)  Source: .Blood Blood-Peripheral  Preliminary Report (13 Jul 2020 11:01):    No growth to date.    Culture - Blood (collected 12 Jul 2020 10:04)  Source: .Blood Blood-Peripheral  Preliminary Report (13 Jul 2020 11:01):    No growth to date.    Culture - Blood (collected 10 Jul 2020 00:22)  Source: .Blood Blood-Peripheral  Enterococcus faecium (vancomycin resistant) (12 Jul 2020 11:59)  Organism: Enterococcus faecium (vancomycin resistant) (12 Jul 2020 11:59)      -  Ampicillin: R >8 Predicts results to ampicillin/sulbactam, amoxacillin-clavulanate and  piperacillin-tazobactam.      -  Daptomycin: S 4      -  Gentamicin synergy: S      -  Linezolid: S 1      -  Streptomycin synergy: R      -  Vancomycin: R >16      Method Type: LEONARDO    Culture - Urine (collected 11 Jul 2020 03:14)  Source: .Urine Catheterized  Final Report (11 Jul 2020 23:38):    No growth    RADIOLOGY:  imaging below personally reviewed    OTHER TESTS:  COVID-19 PCR: NotDetec (07-09-20 @ 14:59)    Procalcitonin, Serum: 0.71 (07-15 @ 07:27)  Procalcitonin, Serum: 0.68 (07-14 @ 07:05) Follow Up: VRE bacteremia and post-OLT    Interval History/ROS:Patient is a 64y old  Male who presents with a chief complaint of Liver failure, hepatic encephalopathy (15 Jul 2020 01:58)      Allergies  codeine (Anaphylaxis)    ANTIMICROBIALS:    linezolid  IVPB 600 every 12 hours 7/10-  meropenem  IVPB 1000 every 8 hours 7/2-  nystatin    Suspension [id number] four times a day 6/11-  trimethoprim   80 mG/sulfamethoxazole 400 mG 1 daily 6/11-  valGANciclovir 450 daily 7/3-    ANTIMICROBIALS (past 90 days):  MEDICATIONS  (STANDING):  s/p clarithromycin 6/30-7/1  s/p DAPTOmycin IVPB 7/3-4  s/p linezolid    Tablet 6/15-6/22  s/p metroNIDAZOLE  6/30-7/1  s/p rifAXIMin 6/11-7/1  s/p meropenem 6/12-6/15    OTHER MEDS: MEDICATIONS  (STANDING):  aspirin enteric coated 81 daily  insulin glargine Injectable (LANTUS) 13 at bedtime  insulin lispro (HumaLOG) corrective regimen sliding scale  three times a day before meals  insulin lispro (HumaLOG) corrective regimen sliding scale  at bedtime  mirtazapine 30 at bedtime  mycophenolate mofetil 500 <User Schedule>  pantoprazole    Tablet 40 before breakfast  predniSONE   Tablet 5 daily  tacrolimus 3 <User Schedule>  tamsulosin 0.8 at bedtime  ursodiol Capsule 300 two times a day    Vital Signs Last 24 Hrs  T(F): 98.6 (07-15-20 @ 07:00), Max: 98.8 (07-14-20 @ 15:00)    Vital Signs Last 24 Hrs  HR: 83 (07-15-20 @ 09:00) (57 - 90)  BP: 119/67 (07-15-20 @ 09:00) (100/71 - 131/59)  RR: 36 (07-15-20 @ 09:00)  SpO2: 100% (07-15-20 @ 09:00) (96% - 100%)  Wt(kg): --    EXAM:  Constitutional: Not in acute distress. Tremor+  Eyes: pupils bilaterally reactive to light. No icterus.  Oral cavity: Clear, no lesions  Neck: No neck vein distension noted  RS: Chest clear to auscultation bilaterally. No wheeze/rhonchi/crepitations.  CVS: S1, S2 heard. Regular rate and rhythm. No murmurs/rubs/gallops.  Abdomen: Liver transplant surgical scar seen, wound clean. Ascites drainage port seen, no fluid out. Left groin surgical scar seen, no collection seen.  : No acute abnormalities  Extremities: Warm. No pedal edema  Skin: No lesions noted  Vascular: No evidence of phlebitis  Neuro: A&O*3    Labs:                        10.4   4.31  )-----------( 149      ( 15 Jul 2020 07:28 )             32.4     07-15    138  |  99  |  28<H>  ----------------------------<  74  4.3   |  32<H>  |  1.30    Ca    9.3      15 Jul 2020 07:27  Phos  3.4     07-15  Mg     2.0     07-15    TPro  5.7<L>  /  Alb  2.9<L>  /  TBili  1.3<H>  /  DBili  0.5<H>  /  AST  24  /  ALT  70<H>  /  AlkPhos  185<H>  07-15    WBC Count: 4.31 (07-15-20 @ 07:28)  WBC Count: 4.68 (07-14-20 @ 07:05)  WBC Count: 4.58 (07-13-20 @ 05:25)    Creatinine, Serum: 1.30 (07-15)  Creatinine, Serum: 1.26 (07-14)  Creatinine, Serum: 1.19 (07-13)      MICROBIOLOGY:    Culture - Body Fluid with Gram Stain (collected 14 Jul 2020 00:32)  Source: .Body Fluid Abdominal Fluid  Gram Stain (14 Jul 2020 04:12):    polymorphonuclear leukocytes seen    No organisms seen    by cytocentrifuge  Preliminary Report (14 Jul 2020 22:08):    No growth    Culture - Blood (collected 12 Jul 2020 10:04)  Source: .Blood Blood-Peripheral  Preliminary Report (13 Jul 2020 11:01):    No growth to date.    Culture - Blood (collected 12 Jul 2020 10:04)  Source: .Blood Blood-Peripheral  Preliminary Report (13 Jul 2020 11:01):    No growth to date.    Culture - Blood (collected 10 Jul 2020 00:22)  Source: .Blood Blood-Peripheral  Enterococcus faecium (vancomycin resistant) (12 Jul 2020 11:59)  Organism: Enterococcus faecium (vancomycin resistant) (12 Jul 2020 11:59)      -  Ampicillin: R >8 Predicts results to ampicillin/sulbactam, amoxacillin-clavulanate and  piperacillin-tazobactam.      -  Daptomycin: S 4      -  Gentamicin synergy: S      -  Linezolid: S 1      -  Streptomycin synergy: R      -  Vancomycin: R >16      Method Type: LEONARDO    Culture - Urine (collected 11 Jul 2020 03:14)  Source: .Urine Catheterized  Final Report (11 Jul 2020 23:38):    No growth    RADIOLOGY:    <The imaging below has been reviewed and visualized by me independently. Findings as detailed in report below>    < from: US Duplex Arterial Lower Ext Ltd, Left (07.13.20 @ 16:25) >  INTERPRETATION:  CLINICAL INFORMATION: Evaluate fluid collection seen on CT. Left groin.  COMPARISON: CT dictated 7/11/2020.  TECHNIQUE: Sonography of the abdomen.   FINDINGS:  There is a 9.2 x 3.6 x 5.7 cm anechoic fluid collection with some complexity and no color-flow on Doppler.   IMPRESSION:   There is a 9 cm mildly complex, anechoic fluid collection that likely represents a seroma or lymphocele.  < end of copied text >    OTHER TESTS:  COVID-19 PCR: NotDetec (07-09-20 @ 14:59)    Procalcitonin, Serum: 0.71 (07-15 @ 07:27)  Procalcitonin, Serum: 0.68 (07-14 @ 07:05)

## 2020-07-15 NOTE — PROGRESS NOTE ADULT - ASSESSMENT
64 M hx of DM, HLD, GERD, HFpEF with mild LV diastolic dysfunction, and decompensated JEAN cirrhosis c/b ascites with hx of SBP, HE, duodenal ulcer, GAVE and duodenal AVM s/p APC (last on 10/11/19), UNOS listed for liver transplantation at [facility]. 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 He is now re-admitted with hepatic encephalopathy and VRE UTI, also with MISA on CKD. S/p liver transplant 7/2.    Impression:  #S/p liver transplant 7/2: liver enzymes continue to downtrend  OR details:  - L groin cutdown for vv bypass   - anastomosis: bicaval end-end/CBD duct-duct/HA & PV end-end, all standard anatomy.    - IOC with good flow  - Simulect induction. 500mg Solumedrol  - JPs x3 with T-Tube  - 14U pRBC, 14U FFP, 10 PLT, 11 CRYO, 500mL returned from cell saver, EBL 5L, UOP 1100mL  Recipient Info:   ABO: A  CMV:  Neg  EBV Positive  Donor:  Donor ID:  [id number] Match: [id number]  Age: 29 ABO:  A1 High Risk:   No COD: Cardiovascular  Anti CMV positive, EBV IgG- positive  HepBcAb-neg, Hepatitis C-DANA- neg, Hepatitis C ab-neg  # Elevated liver enzymes: resolving. Possible secondary to DILI vs sepsis related. Has VRE and on linezolid and meropenem.  # VRE on culture from 7/10: most recent culture negative from 7/12. On mari and linezolid. CT showing groin hematoma, s/p aspiration without c/f infection on studies.  #Tremors: improved but ongoing symptoms c/f taco toxicity. May need medication change based on clinical course  #Halluciniations/agitation: remaints agitated with issues in sleep wake cycle. Breifly on precedex. Resolved hallucinations  #GI bleed - Hgb stable. No overt bleeding. 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 GAVE and PHG should have improved w/ liver transplant, most likely etiology is NG tube trauma prior if any c/f GI bleed  # Abnormal Chest X-Ray c/f evolving pna vs atelectasis: now on meropenem and linezolid. No signs of active infection  # Hypertension on nifedipine now. BP improved  # Lower ext edema: diuresing well, improving on lasix  #R posterior heel wound w/ overlying eschar –Local wound care. no signs of infections, XR neg for gas, evaluated by podiatry and ID. MRI w/o contrast limited, but no overt signs of active infection.  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   # Hyperglycemia with episodes of overcorrection    Recommendation:  - meropenem/linezolid per transplant ID; plan to d/c meropenem after 1 more day  - continue with standing magnesium  - diuresis with Lasix 80 mg IV and dosing daily based on clinical improvement  - monitor renal function  - prednisone 5 mg po daily  - restart Cellcept at half dose, 500mg BID  - continue with tacro dosing per transplant surgery  - consider switch to CsA given ongoing neurotoxicity from tacro  - psych consult for agitation/delerium eval and management; medication to be approved by transplant team  - glycemic control   - c/w nystatin, Bactrim, valcyte ppx  - trend Hb- PO PPI BID, monitor bowel movements  - aspirin per surgery  - diet as tolerated  - wound care/nursing for R heel ulcer  - agitation precaution  - f/u transplant surgery recs  - rest of care per SICU team  - transplant hepatology will follow

## 2020-07-15 NOTE — PROGRESS NOTE ADULT - PROBLEM SELECTOR PLAN 1
-test BG AC/HS  -Decrease Lantus 10 units AC meals  -Recommend start Humalog 4 units w/meals (hold if pt not eating-can give after pt starts eating since PCA sitting at bedside to assess)  -c/w Humalog low correction scale AC and Low HS scale  DISPO: basal/bolus, with doses TBD  -Pt can resume f/u with Dr. Mathur or   [facility] [address] [place] [address] [phone].

## 2020-07-15 NOTE — PROGRESS NOTE ADULT - SUBJECTIVE AND OBJECTIVE BOX
HISTORY  64y Male c/b small esophageal varices (12/2019), portal hypertensive gastropathy, ascites, hx SBP, hx hepatic encephalopathy, GAVE syndrome s/p APC, hx duodenal ulcer (5/2019), HTN, HLD, DM, HFpEF (EF 70%), recent ESBL E coli prostate abscess (3/2020 s/p 4 weeks ertapenem), orthostatic hypotension ( on midodrine) who was initially admitted to medicine for hepatic encephalopathy. Hospital course c/b MISA, VRE UTI, acute blood loss anemia, received blood transfusion, EGD done 6/22 with Grade II esophageal varices, acute gastritis with hemorrhage and acute duodenitis with hemorrhage. Patient had worsening mental status, concerning decompensating liver cirrhosis, hepatic encephalopathy patient then transferred to SICU for further care. He is now s/p OLT complicated by delerium, bacteremia, hyper/hypoglycemia.      24 HOUR EVENTS:  - Increased dose of remeron to 30mg qhs, added low-dose precedex (0.2) for sleep aid  - IV protonix transitioned to Protonix 40mg PO daily  - Hypoglycemic to 80s -> decreased Lantus to 13u qhs, dc'ed premeal insulin      NEURO  Exam: AOx2, delirious, +hallucinations  Meds: dexMEDEtomidine Infusion 0.501 MICROgram(s)/kG/Hr IV Continuous <Continuous>  [x] Adequacy of sedation and pain control has been assessed and adjusted      RESPIRATORY  RR: 10 (07-15-20 @ 01:00) (10 - 26)  SpO2: 100% (07-15-20 @ 01:00) (96% - 100%)  Exam: unlabored  Meds:       CARDIOVASCULAR  HR: 65 (07-15-20 @ 01:00) (57 - 90)  BP: 128/61 (07-15-20 @ 01:00) (92/53 - 131/59)  BP(mean): 86 (07-15-20 @ 01:00) (67 - 96)  Exam: RRR  Cardiac Rhythm: normal sinus  Perfusion     [x]Adequate   [ ]Inadequate  Mentation   [ ]Normal       [x]Reduced  Extremities  [x]Warm         [ ]Cool  Volume Status [ ]Hypervolemic [ ]Euvolemic [ ]Hypovolemic  Meds: tamsulosin 0.8 milliGRAM(s) Oral at bedtime      GI/NUTRITION  Exam: soft, non-tender, non-distended, incisions intact,   Diet: regular, consistent carb  Meds: pantoprazole    Tablet 40 milliGRAM(s) Oral before breakfast  ursodiol Capsule 300 milliGRAM(s) Oral two times a day      GENITOURINARY  I&O's Detail    07-13 @ 07:01  - 07-14 @ 07:00  --------------------------------------------------------  IN:    dexmedetomidine Infusion: 55.6 mL    Oral Fluid: 708 mL    Solution: 350 mL    Solution: 450 mL  Total IN: 1563.6 mL    OUT:    Drain: 100 mL    Indwelling Catheter - Urethral: 5490 mL  Total OUT: 5590 mL    Total NET: -4026.4 mL    07-14 @ 07:01  -  07-15 @ 01:58  --------------------------------------------------------  IN:    dexmedetomidine Infusion: 20.8 mL    Oral Fluid: 1140 mL    Solution: 400 mL    Solution: 300 mL  Total IN: 1860.8 mL    OUT:    Indwelling Catheter - Urethral: 1165 mL  Total OUT: 1165 mL    Total NET: 695.8 mL    07-14    138  |  99  |  27<H>  ----------------------------<  76  4.4   |  31  |  1.26    Ca    8.8      14 Jul 2020 07:05  Phos  3.8     07-14  Mg     2.0     07-14    TPro  5.3<L>  /  Alb  2.9<L>  /  TBili  1.2  /  DBili  0.6<H>  /  AST  36  /  ALT  90<H>  /  AlkPhos  199<H>  07-14    [x] Beasley catheter, indication: failed TOV  Meds: magnesium oxide 800 milliGRAM(s) Oral two times a day with meals      HEMATOLOGIC  Meds: aspirin enteric coated 81 milliGRAM(s) Oral daily  [ ] VTE Prophylaxis                        10.2   4.68  )-----------( 136      ( 14 Jul 2020 07:05 )             31.3     PT/INR - ( 14 Jul 2020 07:05 )   PT: 11.8 sec;   INR: 0.99 ratio    PTT - ( 14 Jul 2020 07:05 )  PTT:28.5 sec  Transfusion     [ ] PRBC   [ ] Platelets   [ ] FFP   [ ] Cryoprecipitate      INFECTIOUS DISEASES  T(C): 37 (07-14-20 @ 23:00), Max: 37.1 (07-14-20 @ 15:00)  Wt(kg): --  WBC Count: 4.68 K/uL (07-14 @ 07:05)    Recent Cultures:  Specimen Source: .Body Fluid Abdominal Fluid, 07-14 @ 00:32; Results   No growth; Gram Stain:   polymorphonuclear leukocytes seen  No organisms seen  by cytocentrifuge; Organism: --  Specimen Source: .Blood Blood-Peripheral, 07-12 @ 10:04; Results   No growth to date.; Gram Stain: --; Organism: --  Specimen Source: .Urine Catheterized, 07-11 @ 03:14; Results   No growth; Gram Stain: --; Organism: --  Specimen Source: .Blood Blood-Peripheral, 07-10 @ 00:22; Results   Growth in aerobic bottle: Enterococcus faecium (vancomycin resistant)  "Due to technical problems, Proteus sp. will Not be reported as part of  the BCID panel until further notice"  ***Blood Panel PCR results on this specimen are available  approximately 3 hours after the Gram stain result.***  Gram stain, PCR, and/or culture results may not always  correspond due to difference in methodologies.  ************************************************************  This PCR assay was performed using [employer].  The following targets are tested for: Enterococcus,  vancomycin resistant enterococci, Listeria monocytogenes,  coagulase negative staphylococci, S. aureus,  methicillin resistant S. aureus, Streptococcus agalactiae  (Group B), S. pneumoniae, S. pyogenes (Group A),  Acinetobacter baumannii, Enterobacter cloacae, E. coli,  Klebsiella oxytoca, K. pneumoniae, Proteus sp.,  Serratia marcescens, Haemophilus influenzae,  Neisseria meningitidis, Pseudomonas aeruginosa, Candida  albicans, C. glabrata, C krusei, C parapsilosis,  C. tropicalis and the KPC resistance gene.; Gram Stain:   Growth in aerobic bottle: Gram positive cocci in pairs; Organism: Blood Culture PCR  Enterococcus faecium (vancomycin resistant)    Meds: linezolid  IVPB 600 milliGRAM(s) IV Intermittent every 12 hours  meropenem  IVPB 1000 milliGRAM(s) IV Intermittent every 8 hours  nystatin    Suspension [id number] Unit(s) Swish and Swallow four times a day  tacrolimus 3 milliGRAM(s) Oral <User Schedule>  trimethoprim   80 mG/sulfamethoxazole 400 mG 1 Tablet(s) Oral daily  valGANciclovir 450 milliGRAM(s) Oral daily      ENDOCRINE  Capillary Blood Glucose    Meds: insulin glargine Injectable (LANTUS) 13 Unit(s) SubCutaneous at bedtime  insulin lispro (HumaLOG) corrective regimen sliding scale   SubCutaneous at bedtime  insulin lispro (HumaLOG) corrective regimen sliding scale   SubCutaneous three times a day before meals  predniSONE   Tablet 5 milliGRAM(s) Oral daily      ACCESS DEVICES:  [x] Peripheral IV  [ ] Central Venous Line	[ ] R	[ ] L	[ ] IJ	[ ] Fem	[ ] SC	Placed:   [ ] Arterial Line		[ ] R	[ ] L	[ ] Fem	[ ] Rad	[ ] Ax	Placed:   [ ] PICC:					[ ] Mediport  [x] Urinary Catheter, Date Placed:   [x] Necessity of urinary, arterial, and venous catheters discussed    OTHER MEDICATIONS:  chlorhexidine 2% Cloths 1 Application(s) Topical <User Schedule>  collagenase Ointment 1 Application(s) Topical daily  nystatin Cream 1 Application(s) Topical two times a day      CODE STATUS: full code    IMAGING:

## 2020-07-15 NOTE — PROGRESS NOTE ADULT - ASSESSMENT
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    [] POD 13 s/p OLT   - Good graft function, LFTs trending down  - Liver doppler (7/12) with patent vessels  - Immuno: FK 3mg bid, Pred 5mg daily  - PPX: valcyte/nystatin/bactrim  - Continue ASA 81mg daily  - LE edema: hold diuresis todaay, without 80mg lasix   - h/o UGI bleed: Continue Protonix 40mg IV bid, stable H/H    - Cont Mag 800mg BID, keep Mag >2  - Regular, carbohydrate restricted diet  - Pain control PRN  -start cellcept at 500mg BID   - consult neuropsych for assistance in managing delirium   - DVT PPx: SCDs at all times    [] VRE bacteremia (7/10)  - ID following: on meropenem/linezolid; plan to dc meropenem pending cx from L groin aspiration  - Bld cx from 7/12 with NGTD, L groin collection aspirated (7/13) - cx pending     [] Hyperglycemia  - Appreciate endocrinology recommendations  - Cont Lantus and Humalog insulin wih SSI coverage  - Fingersticks ac and qhs    [] Heel ulcer  Continue local wound care w/ santyl and DSD as per podiatry    Continue ICU care

## 2020-07-15 NOTE — BEHAVIORAL HEALTH ASSESSMENT NOTE - NSBHCHARTREVIEWIMAGING_PSY_A_CORE FT
< from: CT Head No Cont (06.11.20 @ 11:52) >      EXAM:  CT BRAIN                            PROCEDURE DATE:  06/11/2020            INTERPRETATION:    CLINICAL INDICATION: Confusion, liver failure    5mm axial sections of the brain were obtained from base to vertex, without the intravenous administration of contrast material. Coronal and sagittal computer generated reconstructed views are available.    Comparison is made with the prior CT of 3/10/2020 and demonstrates no significant interval change.    The ventricles and sulci are slightly prominent size consistent with atrophy. Small vessel white matter ischemic changes are noted. There is no hemorrhage or mass. Bone window examination is remarkable for sclerosis and opacification of right mastoid air cells.    IMPRESSION: Atrophy and small vessel white matter ischemic changes. No change since 3/10/2020.    CALE BACA MD, ATTENDING RADIOLOGIST  This document has been electronically signed. Jun 11 2020 11:58AM           < end of copied text >

## 2020-07-15 NOTE — PROGRESS NOTE ADULT - ASSESSMENT
63 yo M with hx of T2DM uncontrolled due to steroids x 10 years with neuropathy and R DFU of the heel, HTN, HLD, MGUS, and decompensated JEAN cirrhosis s/p liver x-plant on 7/2/2020 admitted 6/11/2020 with confusion secondary to liver failure and encephalopathy. Endocrine consulted for glycemic control in the setting of steroid use now on Prednisone 5mg daily. Pt. w/ hyperglycemia yesterday, and big drop in glucose level ON. BG goal (100-180mg/dl).

## 2020-07-15 NOTE — BEHAVIORAL HEALTH ASSESSMENT NOTE - NSBHCHARTREVIEWLAB_PSY_A_CORE FT
10.4   4.31  )-----------( 149      ( 15 Jul 2020 07:28 )             32.4     07-15    138  |  99  |  28<H>  ----------------------------<  74  4.3   |  32<H>  |  1.30    Ca    9.3      15 Jul 2020 07:27  Phos  3.4     07-15  Mg     2.0     07-15    TPro  5.7<L>  /  Alb  2.9<L>  /  TBili  1.3<H>  /  DBili  0.5<H>  /  AST  24  /  ALT  70<H>  /  AlkPhos  185<H>  07-15

## 2020-07-15 NOTE — BEHAVIORAL HEALTH ASSESSMENT NOTE - SUMMARY
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 Hospital course c/b MISA, VRE UTI, acute blood loss anemia, received blood transfusion, EGD done 6/22 with Grade II esophageal varices, acute gastritis with hemorrhage and acute duodenitis with hemorrhage. Patient had worsening mental status, concerning decompensating liver cirrhosis, hepatic encephalopathy patient then transferred to SICU for further care. He is now s/p OLT complicated by delirium, bacteremia, hyper/hypoglycemia.  Psychiatry consulted to assess for delirium, agitation. 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 Hospital course c/b MISA, VRE UTI, acute blood loss anemia, received blood transfusion, EGD done 6/22 with Grade II esophageal varices, acute gastritis with hemorrhage and acute duodenitis with hemorrhage. Patient had worsening mental status, concerning decompensating liver cirrhosis, hepatic encephalopathy patient then transferred to SICU for further care. He is now s/p OLT complicated by delirium, bacteremia, hyper/hypoglycemia.  Psychiatry consulted to assess for delirium, agitation.  Patient seen and evaluated, awake and alert oriented x 2.  Able to report some recent events in the hospital such as having a liver transplant.  Denies SI/HI, denies feeling depressed.  Reports feeling frustrated that staff in the hospital have been telling him he is confused however patient disagrees with this.  Reports at times feeling as if people are standing behind him.  Currently denies AVH.  Per wife, patient for the past week has been waxing and waning, reports yesterday patient was having visual hallucinations, seeing tacos on the floor.  She reports patient is also more irritable.  Denies patient has any cognitive deficits or dementia at baseline.  At this time, patient's presentation consistent with delirium 2/2 GMC.  Would recommend decreasing Remeron to 15mg po qhs as high doses may be more activating and starting Seroquel 25mg po q1800 as well as melatonin 3mg po qhs.

## 2020-07-15 NOTE — BEHAVIORAL HEALTH ASSESSMENT NOTE - AXIS III
small esophageal varices (12/2019), portal hypertensive gastropathy, ascites, hx SBP, hx hepatic encephalopathy, GAVE syndrome s/p APC, hx duodenal ulcer (5/2019), HTN, HLD, DM, HFpEF (EF 70%), recent ESBL E coli prostate abscess (3/2020 s/p 4 weeks ertapenem), orthostatic hypotension ( on midodrine)

## 2020-07-15 NOTE — PROGRESS NOTE ADULT - SUBJECTIVE AND OBJECTIVE BOX
Chief Complaint: DM    S: No ON events reported. Pt. c/o feeling hungry. As per PCA, he ate his entire bkfst and is currently eating sugar free applesauce. No other complaints.    MEDICATIONS  (STANDING):  aspirin enteric coated 81 milliGRAM(s) Oral daily  chlorhexidine 2% Cloths 1 Application(s) Topical <User Schedule>  collagenase Ointment 1 Application(s) Topical daily  insulin glargine Injectable (LANTUS) 13 Unit(s) SubCutaneous at bedtime  insulin lispro (HumaLOG) corrective regimen sliding scale   SubCutaneous three times a day before meals  insulin lispro (HumaLOG) corrective regimen sliding scale   SubCutaneous at bedtime  linezolid  IVPB 600 milliGRAM(s) IV Intermittent every 12 hours  magnesium oxide 800 milliGRAM(s) Oral two times a day with meals  meropenem  IVPB 1000 milliGRAM(s) IV Intermittent every 8 hours  mirtazapine 30 milliGRAM(s) Oral at bedtime  mycophenolate mofetil 500 milliGRAM(s) Oral <User Schedule>  nystatin    Suspension [id number] Unit(s) Swish and Swallow four times a day  nystatin Cream 1 Application(s) Topical two times a day  pantoprazole    Tablet 40 milliGRAM(s) Oral before breakfast  predniSONE   Tablet 5 milliGRAM(s) Oral daily  tacrolimus 3 milliGRAM(s) Oral <User Schedule>  tamsulosin 0.8 milliGRAM(s) Oral at bedtime  trimethoprim   80 mG/sulfamethoxazole 400 mG 1 Tablet(s) Oral daily  ursodiol Capsule 300 milliGRAM(s) Oral two times a day  valGANciclovir 450 milliGRAM(s) Oral daily    MEDICATIONS  (PRN):      Allergies    codeine (Anaphylaxis)    Intolerances      Review of Systems:  Constitutional: No fever  Eyes: No blurry vision  Neuro: No tremors  HEENT: No pain  Cardiovascular: No chest pain, palpitations  Respiratory: No SOB, no cough  GI: No nausea, vomiting, abdominal pain  : No dysuria  Skin: no rash  Psych: no depression  Endocrine: no polyuria, polydipsia  Hem/lymph: no swelling  Osteoporosis: no fractures        PHYSICAL EXAM:  VITALS: T(C): 37 (07-15-20 @ 07:00)  T(F): 98.6 (07-15-20 @ 07:00), Max: 98.8 (07-14-20 @ 15:00)  HR: 79 (07-15-20 @ 10:00) (57 - 90)  BP: 121/58 (07-15-20 @ 10:00) (100/71 - 131/59)  RR:  (10 - 36)  SpO2:  (96% - 100%)  Wt(kg): --  GENERAL: NAD, well-groomed, well-developed  EYES: No proptosis, anicteric  HEENT:  Atraumatic, Normocephalic, moist mucous membranes  SKIN: Dry, intact  MUSCULOSKELETAL: Full range of motion  PSYCH: Alert and oriented x 3      POCT Blood Glucose.: 108 mg/dL (07-15-20 @ 08:36)  POCT Blood Glucose.: 279 mg/dL (07-14-20 @ 21:21)  POCT Blood Glucose.: 245 mg/dL (07-14-20 @ 15:36)  POCT Blood Glucose.: 197 mg/dL (07-14-20 @ 11:45)  POCT Blood Glucose.: 90 mg/dL (07-14-20 @ 08:18)  POCT Blood Glucose.: 151 mg/dL (07-13-20 @ 21:10)  POCT Blood Glucose.: 100 mg/dL (07-13-20 @ 17:28)  POCT Blood Glucose.: 80 mg/dL (07-13-20 @ 16:58)  POCT Blood Glucose.: 62 mg/dL (07-13-20 @ 16:42)  POCT Blood Glucose.: 65 mg/dL (07-13-20 @ 16:42)  POCT Blood Glucose.: 172 mg/dL (07-13-20 @ 13:30)  POCT Blood Glucose.: 150 mg/dL (07-13-20 @ 12:00)  POCT Blood Glucose.: 111 mg/dL (07-13-20 @ 07:40)  POCT Blood Glucose.: 228 mg/dL (07-12-20 @ 23:59)  POCT Blood Glucose.: 198 mg/dL (07-12-20 @ 21:39)  POCT Blood Glucose.: 239 mg/dL (07-12-20 @ 17:09)  POCT Blood Glucose.: 219 mg/dL (07-12-20 @ 11:43)      07-15    138  |  99  |  28<H>  ----------------------------<  74  4.3   |  32<H>  |  1.30    EGFR if : 67  EGFR if non : 58<L>    Ca    9.3      07-15  Mg     2.0     07-15  Phos  3.4     07-15    TPro  5.7<L>  /  Alb  2.9<L>  /  TBili  1.3<H>  /  DBili  0.5<H>  /  AST  24  /  ALT  70<H>  /  AlkPhos  185<H>  07-15          Thyroid Function Tests:

## 2020-07-15 NOTE — PROGRESS NOTE ADULT - SUBJECTIVE AND OBJECTIVE BOX
Chief Complaint:  Patient is a 64y old  Male who presents with a chief complaint of Liver failure, hepatic encephalopathy (15 Jul 2020 10:53)      Interval Events: Patient received Seroquel and Remeron last night and then started on precedex gtt for agitated/confused. Precedex off in the AM. He felt well in the morning. No complaints. Patient wants to go home to see family     Allergies:  codeine (Anaphylaxis)      Hospital Medications:  aspirin enteric coated 81 milliGRAM(s) Oral daily  chlorhexidine 2% Cloths 1 Application(s) Topical <User Schedule>  collagenase Ointment 1 Application(s) Topical daily  insulin glargine Injectable (LANTUS) 10 Unit(s) SubCutaneous at bedtime  insulin lispro (HumaLOG) corrective regimen sliding scale   SubCutaneous three times a day before meals  insulin lispro (HumaLOG) corrective regimen sliding scale   SubCutaneous at bedtime  insulin lispro Injectable (HumaLOG) 4 Unit(s) SubCutaneous three times a day before meals  linezolid  IVPB 600 milliGRAM(s) IV Intermittent every 12 hours  magnesium oxide 800 milliGRAM(s) Oral two times a day with meals  meropenem  IVPB 1000 milliGRAM(s) IV Intermittent every 8 hours  mirtazapine 30 milliGRAM(s) Oral at bedtime  mycophenolate mofetil 500 milliGRAM(s) Oral <User Schedule>  nystatin    Suspension 949149 Unit(s) Swish and Swallow four times a day  nystatin Cream 1 Application(s) Topical two times a day  pantoprazole    Tablet 40 milliGRAM(s) Oral before breakfast  predniSONE   Tablet 5 milliGRAM(s) Oral daily  tacrolimus 3 milliGRAM(s) Oral <User Schedule>  tamsulosin 0.8 milliGRAM(s) Oral at bedtime  trimethoprim   80 mG/sulfamethoxazole 400 mG 1 Tablet(s) Oral daily  ursodiol Capsule 300 milliGRAM(s) Oral two times a day  valGANciclovir 450 milliGRAM(s) Oral daily      PMHX/PSHX:  GIB (gastrointestinal bleeding)  GERD with esophagitis  Hepatic encephalopathy  Obesity  Fatty liver disease, nonalcoholic  Renal stones  Hypertension  Neuropathy  Hypercholesteremia  Diabetes  S/P cholecystectomy  No significant past surgical history      Family history:  Family history of type 2 diabetes mellitus  Family history of hypertension  Family history of stomach cancer  No pertinent family history in first degree relatives      ROS: As per HPI, 14-point ROS negative otherwise.      PHYSICAL EXAM:     Vital Signs:  Vital Signs Last 24 Hrs  T(C): 37 (15 Jul 2020 11:00), Max: 37.1 (2020 15:00)  T(F): 98.6 (15 Jul 2020 11:00), Max: 98.8 (2020 15:00)  HR: 80 (15 Jul 2020 11:00) (57 - 90)  BP: 131/61 (15 Jul 2020 11:00) (100/71 - 131/61)  BP(mean): 88 (15 Jul 2020 11:00) (75 - 96)  RR: 16 (15 Jul 2020 11:00) (10 - 36)  SpO2: 100% (15 Jul 2020 11:00) (97% - 100%)  Daily     Daily Weight in k.7 (15 Jul 2020 00:12)    GENERAL: no acute distress  NEURO: alert, AAOx3, +tremors but improving, answers questions and follows commands  HEENT: anicteric sclera, no conjunctival pallor appreciated  CHEST: no respiratory distress, no accessory muscle use  CARDIAC: regular rate, rhythm  ABDOMEN: soft, incisions intact, non tender, ND, T tube tied, prior area of ANDREINA drain removed without notable oozing/drainage  EXTREMITIES: warm, well perfused, trace BLLE edema  SKIN: R heel with small wound without purulence    LABS:                        10.4   4.31  )-----------( 149      ( 15 Jul 2020 07:28 )             32.4     07-15    138  |  99  |  28<H>  ----------------------------<  74  4.3   |  32<H>  |  1.30    Ca    9.3      15 Jul 2020 07:27  Phos  3.4     07-15  Mg     2.0     07-15    TPro  5.7<L>  /  Alb  2.9<L>  /  TBili  1.3<H>  /  DBili  0.5<H>  /  AST  24  /  ALT  70<H>  /  AlkPhos  185<H>  07-15    LIVER FUNCTIONS - ( 15 Jul 2020 07:27 )  Alb: 2.9 g/dL / Pro: 5.7 g/dL / ALK PHOS: 185 U/L / ALT: 70 U/L / AST: 24 U/L / GGT: x           PT/INR - ( 15 Jul 2020 07:28 )   PT: 12.0 sec;   INR: 1.01 ratio         PTT - ( 15 Jul 2020 07:28 )  PTT:29.7 sec    Amylase Serum--      Lipase serum--       [id number]      Imaging:

## 2020-07-15 NOTE — BEHAVIORAL HEALTH ASSESSMENT NOTE - NSBHCONSULTMEDS_PSY_A_CORE FT
-decrease Remeron to 15mg po qhs   -start Seroquel 25mg po q1800 (if qtc <500ms on ekg)  -start melatonin 3mg po qhs

## 2020-07-15 NOTE — PROGRESS NOTE ADULT - ASSESSMENT
#PENDING RECS. PLEASE WAIT FOR FINAL RECS AFTER DISCUSSION WITH ATTENDING#    ASSESSMENT:  63 y/o M PMHx decompensated JEAN Cirrhosis on transplant list, DMII, HFpEF, recent admission for ESBL E coli prostatic abscess 2/2 4 wks ertapenem, hx of ESBL UTIs with prostate abscess s/p IV abx, recent VRE urinary colonization came to hospital for worsening jaundice and episode of confusion, resolved PTA. s/p treatment course for possible VRE UTI. Now s/p OLT on 7/2/20. Course complicated by Encephalopathy and Tremors. Blood Cultures (7/10) with VRE sensitive to Linezolid. CT C/A/P without obvious intraabdominal source. Hypoxia prior to positive BCx which may be related to sepsis CT Chest (7/11) without evidence of pneumonia. Had L groin fluid collection which appears to be consistent with seroma - sample obtained for culture also negative. No drainage, erythema or underlying fluctuance was appreciated at the right heel. MRI prior to transplant without obvious infection.    RECOMMENDATIONS    #VRE Bacteremia, L Groin Collection, Encephalopathy  - Abd US showed 9cm Left groin collection: likely represents a seroma or lymphocele; aspirated - cx: PMN seen, no organism seen  - Continue Linezolid 600 mg IV Q12H  - Continue monitoring tacrolimus levels  - Would obtain TTE given VRE bacteremia    #Hypoxic Respiratory Failure, Encephalopathy  - No evidence of pneumonia and collection in left groin appears consistent with seroma  - Can discontinue Meropenem    #s/p OLT   - Continue bactrim, valcyte for prophylaxis   - Monitor patient for tremors - monitor tacrolimus dose   - Removal of drains and lines as per surgical transplant team    #Right heel wound  --Podiatry on board    --Local wound care     Suyapa Mayer MD, PGY4   ID fellow  Pager: [phone]  After 5pm/weekends call [phone] ASSESSMENT:  63 y/o M PMHx decompensated JEAN Cirrhosis on transplant list, DMII, HFpEF, recent admission for ESBL E coli prostatic abscess 2/2 4 wks ertapenem, hx of ESBL UTIs with prostate abscess s/p IV abx, recent VRE urinary colonization came to hospital for worsening jaundice and episode of confusion, resolved PTA. s/p treatment course for possible VRE UTI. Now s/p OLT on 7/2/20. Course complicated by Encephalopathy and Tremors. Blood Cultures (7/10) with VRE sensitive to Linezolid. CT C/A/P without obvious intraabdominal source. Hypoxia prior to positive BCx which may be related to sepsis CT Chest (7/11) without evidence of pneumonia. Had L groin fluid collection which appears to be consistent with seroma - sample obtained for culture also negative. No drainage, erythema or underlying fluctuance was appreciated at the right heel. MRI prior to transplant without obvious infection.    RECOMMENDATIONS    #VRE Bacteremia, L Groin Collection, Encephalopathy  - Abd US showed 9cm Left groin collection: likely represents a seroma or lymphocele; aspirated - cx: PMN seen, no organism seen  - Continue Linezolid 600 mg IV Q12H  - Continue monitoring tacrolimus levels  - Would obtain TTE given VRE bacteremia    #Hypoxic Respiratory Failure, Encephalopathy  - No evidence of pneumonia and collection in left groin appears consistent with seroma  - Can discontinue Meropenem    #s/p OLT   - Continue bactrim, valcyte for prophylaxis   - Monitor patient for tremors - monitor tacrolimus dose   - Removal of drains and lines as per surgical transplant team    #Right heel wound  --Podiatry on board    --Local wound care     Suyapa Mayer MD, PGY4   ID fellow  Pager: [phone]  After 5pm/weekends call [phone]    Case d/w Dr. Pérez

## 2020-07-15 NOTE — PROGRESS NOTE ADULT - ATTENDING COMMENTS
S/p renal transplant, recovering well  Slept well on Precedex drip last night, more oriented, less delirious, continue nocturnal Remeron to facilitate sleep  Hemodynamically stable saturating well on RA, refusion NIV  PO, hepatic function improved  Daily Protonix, HCT stable  Abx Zyvox and Meropenem, repeat cultures including culture from  groin collection neg so far, afebrile, no leucocytosis  ISS/Lantus dose adjustment for borderline hypoglycemia  Transplant meds  PT/mobilization  Psy called for evaluation of delirium  Discussed with transplant team

## 2020-07-15 NOTE — PROGRESS NOTE ADULT - SUBJECTIVE AND OBJECTIVE BOX
Transplant Surgery Progress Note    Transplant Surgery - Multidisciplinary Rounds  --------------------------------------------------------------  OLTx      Date:  7/2/2020       POD#13    Present: Patient seen with multidisciplinary team including Transplant Surgeon: Dr. Alonzo, Transplant Nephrologist OLY Alicea during am rounds and examined with Dr. Alonzo. Disciplines not in attendance will be notified of the plan.     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     Re-admitted on 6/11:   ·	worsening HE  ·	UTI/VRE: tx with linezolid 6/15-22, bactrim DS 6/22 - 7/2  ·	MISA/Hyponatremia  ·	UGI bleed (melena) s/p EGD (6/22) c/w hemorrhagic duodenitis/gastritis; Grade 2 EV; requiring multiple transfusions  ·	Known h/o R foot ulcer (MRI neg for OM)  ·	s/p OLTx on 7/2/2020, post op liver doppler with patent vessels  ·	post op course c/b delirium and VRE bacteremia (7/10)     Interval events:  - POD 13 s/p OLT with good graft function;  LFTs trending down: ALk Phos 185 (199), AST 24 (36), ALT 70 (90), T bili 1.3   - Received Seroquel and Remeron last night; remained agitated/confused, started on Precedex gtt, off this am   - This morning, mental status better, AO x 3, following commands,   - on Precedex overnight  - pain well controlled   - OOB in chair   - tolerating regular diet      O:  Vital Signs Last 24 Hrs  T(C): 37 (15 Jul 2020 07:00), Max: 37.1 (14 Jul 2020 15:00)  T(F): 98.6 (15 Jul 2020 07:00), Max: 98.8 (14 Jul 2020 15:00)  HR: 79 (15 Jul 2020 10:00) (57 - 90)  BP: 121/58 (15 Jul 2020 10:00) (100/71 - 131/59)  BP(mean): 84 (15 Jul 2020 10:00) (75 - 96)  RR: 16 (15 Jul 2020 10:00) (10 - 36)  SpO2: 100% (15 Jul 2020 10:00) (96% - 100%)    I&O's Detail    14 Jul 2020 07:01  -  15 Jul 2020 07:00  --------------------------------------------------------  IN:    dexmedetomidine Infusion: 41.6 mL    Oral Fluid: 1380 mL    Solution: 400 mL    Solution: 300 mL  Total IN: 2121.6 mL    OUT:    Indwelling Catheter - Urethral: 2340 mL  Total OUT: 2340 mL    Total NET: -218.4 mL      15 Jul 2020 07:01  -  15 Jul 2020 10:54  --------------------------------------------------------  IN:    Oral Fluid: 280 mL    Solution: 300 mL  Total IN: 580 mL    OUT:    Indwelling Catheter - Urethral: 350 mL  Total OUT: 350 mL    Total NET: 230 mL          MEDICATIONS  (STANDING):  aspirin enteric coated 81 milliGRAM(s) Oral daily  chlorhexidine 2% Cloths 1 Application(s) Topical <User Schedule>  collagenase Ointment 1 Application(s) Topical daily  insulin glargine Injectable (LANTUS) 13 Unit(s) SubCutaneous at bedtime  insulin lispro (HumaLOG) corrective regimen sliding scale   SubCutaneous three times a day before meals  insulin lispro (HumaLOG) corrective regimen sliding scale   SubCutaneous at bedtime  linezolid  IVPB 600 milliGRAM(s) IV Intermittent every 12 hours  magnesium oxide 800 milliGRAM(s) Oral two times a day with meals  meropenem  IVPB 1000 milliGRAM(s) IV Intermittent every 8 hours  mirtazapine 30 milliGRAM(s) Oral at bedtime  mycophenolate mofetil 500 milliGRAM(s) Oral <User Schedule>  nystatin    Suspension 529251 Unit(s) Swish and Swallow four times a day  nystatin Cream 1 Application(s) Topical two times a day  pantoprazole    Tablet 40 milliGRAM(s) Oral before breakfast  predniSONE   Tablet 5 milliGRAM(s) Oral daily  tacrolimus 3 milliGRAM(s) Oral <User Schedule>  tamsulosin 0.8 milliGRAM(s) Oral at bedtime  trimethoprim   80 mG/sulfamethoxazole 400 mG 1 Tablet(s) Oral daily  ursodiol Capsule 300 milliGRAM(s) Oral two times a day  valGANciclovir 450 milliGRAM(s) Oral daily    MEDICATIONS  (PRN):                            10.4   4.31  )-----------( 149      ( 15 Jul 2020 07:28 )             32.4       07-15    138  |  99  |  28<H>  ----------------------------<  74  4.3   |  32<H>  |  1.30    Ca    9.3      15 Jul 2020 07:27  Phos  3.4     07-15  Mg     2.0     07-15    TPro  5.7<L>  /  Alb  2.9<L>  /  TBili  1.3<H>  /  DBili  0.5<H>  /  AST  24  /  ALT  70<H>  /  AlkPhos  185<H>  07-15      Physical Exam:  Gen: Laying in bed, NAD, alert and oriented.   Resp: Unlabored breathing, lungs CTAB  CV: RRR  Abd: soft, NT, ND, incision c/d/i, T-tube tied, ANDREINA site with serous drainage   Extremities: 2+ palpable DP pulses b/l, heel wound without drainage or purulent material Transplant Surgery Progress Note    Transplant Surgery - Multidisciplinary Rounds  --------------------------------------------------------------  OLTx      Date:  7/2/2020       POD#13    Present: Patient seen with multidisciplinary team including Transplant Surgeon: Dr. Alonzo, Transplant Nephrologist Dr. Salvador, Hepatology Dr. Mo, surgery resident OLY Quezada during am rounds and examined with Dr. Alonzo. Disciplines not in attendance will be notified of the plan.     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     Re-admitted on 6/11:   ·	worsening HE  ·	UTI/VRE: tx with linezolid 6/15-22, bactrim DS 6/22 - 7/2  ·	MISA/Hyponatremia  ·	UGI bleed (melena) s/p EGD (6/22) c/w hemorrhagic duodenitis/gastritis; Grade 2 EV; requiring multiple transfusions  ·	Known h/o R foot ulcer (MRI neg for OM)  ·	s/p OLTx on 7/2/2020, post op liver doppler with patent vessels  ·	post op course c/b delirium and VRE bacteremia (7/10)     Interval events:  - POD 13 s/p OLT with good graft function;  LFTs trending down: ALk Phos 185 (199), AST 24 (36), ALT 70 (90), T bili 1.3   - Received Seroquel and Remeron last night; remained agitated/confused, started on Precedex gtt, off this am   - This morning, mental status better, AO x 3, following commands,   - on Precedex overnight  - pain well controlled   - OOB in chair   - tolerating regular diet      O:  Vital Signs Last 24 Hrs  T(C): 37 (15 Jul 2020 07:00), Max: 37.1 (14 Jul 2020 15:00)  T(F): 98.6 (15 Jul 2020 07:00), Max: 98.8 (14 Jul 2020 15:00)  HR: 79 (15 Jul 2020 10:00) (57 - 90)  BP: 121/58 (15 Jul 2020 10:00) (100/71 - 131/59)  BP(mean): 84 (15 Jul 2020 10:00) (75 - 96)  RR: 16 (15 Jul 2020 10:00) (10 - 36)  SpO2: 100% (15 Jul 2020 10:00) (96% - 100%)    I&O's Detail    14 Jul 2020 07:01  -  15 Jul 2020 07:00  --------------------------------------------------------  IN:    dexmedetomidine Infusion: 41.6 mL    Oral Fluid: 1380 mL    Solution: 400 mL    Solution: 300 mL  Total IN: 2121.6 mL    OUT:    Indwelling Catheter - Urethral: 2340 mL  Total OUT: 2340 mL    Total NET: -218.4 mL      15 Jul 2020 07:01  -  15 Jul 2020 10:54  --------------------------------------------------------  IN:    Oral Fluid: 280 mL    Solution: 300 mL  Total IN: 580 mL    OUT:    Indwelling Catheter - Urethral: 350 mL  Total OUT: 350 mL    Total NET: 230 mL          MEDICATIONS  (STANDING):  aspirin enteric coated 81 milliGRAM(s) Oral daily  chlorhexidine 2% Cloths 1 Application(s) Topical <User Schedule>  collagenase Ointment 1 Application(s) Topical daily  insulin glargine Injectable (LANTUS) 13 Unit(s) SubCutaneous at bedtime  insulin lispro (HumaLOG) corrective regimen sliding scale   SubCutaneous three times a day before meals  insulin lispro (HumaLOG) corrective regimen sliding scale   SubCutaneous at bedtime  linezolid  IVPB 600 milliGRAM(s) IV Intermittent every 12 hours  magnesium oxide 800 milliGRAM(s) Oral two times a day with meals  meropenem  IVPB 1000 milliGRAM(s) IV Intermittent every 8 hours  mirtazapine 30 milliGRAM(s) Oral at bedtime  mycophenolate mofetil 500 milliGRAM(s) Oral <User Schedule>  nystatin    Suspension 913637 Unit(s) Swish and Swallow four times a day  nystatin Cream 1 Application(s) Topical two times a day  pantoprazole    Tablet 40 milliGRAM(s) Oral before breakfast  predniSONE   Tablet 5 milliGRAM(s) Oral daily  tacrolimus 3 milliGRAM(s) Oral <User Schedule>  tamsulosin 0.8 milliGRAM(s) Oral at bedtime  trimethoprim   80 mG/sulfamethoxazole 400 mG 1 Tablet(s) Oral daily  ursodiol Capsule 300 milliGRAM(s) Oral two times a day  valGANciclovir 450 milliGRAM(s) Oral daily    MEDICATIONS  (PRN):                            10.4   4.31  )-----------( 149      ( 15 Jul 2020 07:28 )             32.4       07-15    138  |  99  |  28<H>  ----------------------------<  74  4.3   |  32<H>  |  1.30    Ca    9.3      15 Jul 2020 07:27  Phos  3.4     07-15  Mg     2.0     07-15    TPro  5.7<L>  /  Alb  2.9<L>  /  TBili  1.3<H>  /  DBili  0.5<H>  /  AST  24  /  ALT  70<H>  /  AlkPhos  185<H>  07-15      Physical Exam:  Gen: Laying in bed, NAD, alert and oriented.   Resp: Unlabored breathing, lungs CTAB  CV: RRR  Abd: soft, NT, ND, incision c/d/i, T-tube tied, ANDREINA site with serous drainage   Extremities: 2+ palpable DP pulses b/l, heel wound without drainage or purulent material

## 2020-07-15 NOTE — BEHAVIORAL HEALTH ASSESSMENT NOTE - NSBHCHARTREVIEWINVESTIGATE_PSY_A_CORE FT
< from: 12 Lead ECG (07.08.20 @ 12:15) >      Ventricular Rate 91 BPM    Atrial Rate 91 BPM    P-R Interval 194 ms    QRS Duration 90 ms    Q-T Interval 378 ms    QTC Calculation(Bezet) 464 ms    P Axis 25 degrees    R Axis -6 degrees    T Axis 33 degrees    Diagnosis Line SINUS RHYTHM WITH OCCASIONAL ventricular-paced complexes AND PREMATURE SUPRAVENTRICULAR COMPLEXES  CANNOT RULE OUT ANTERIOR INFARCT , AGE UNDETERMINED  ABNORMAL ECG  WHEN COMPARED WITH ECG OF 05-JUL-2020 19:57,  PACED BEATS ARE NEW  Confirmed by MD Sven, Pedro Luis ([id number]) on 7/10/2020 8:48:32 AM    < end of copied text >

## 2020-07-16 LAB
ALBUMIN SERPL ELPH-MCNC: 2.8 G/DL — LOW (ref 3.3–5)
ALP SERPL-CCNC: 154 U/L — HIGH (ref 40–120)
ALT FLD-CCNC: 50 U/L — HIGH (ref 10–45)
AMMONIA BLD-MCNC: 14 UMOL/L — SIGNIFICANT CHANGE UP (ref 11–55)
ANION GAP SERPL CALC-SCNC: 10 MMOL/L — SIGNIFICANT CHANGE UP (ref 5–17)
APTT BLD: 29.6 SEC — SIGNIFICANT CHANGE UP (ref 27.5–35.5)
AST SERPL-CCNC: 16 U/L — SIGNIFICANT CHANGE UP (ref 10–40)
BILIRUB DIRECT SERPL-MCNC: 0.5 MG/DL — HIGH (ref 0–0.2)
BILIRUB INDIRECT FLD-MCNC: 0.7 MG/DL — SIGNIFICANT CHANGE UP (ref 0.2–1)
BILIRUB SERPL-MCNC: 1.2 MG/DL — SIGNIFICANT CHANGE UP (ref 0.2–1.2)
BUN SERPL-MCNC: 31 MG/DL — HIGH (ref 7–23)
CALCIUM SERPL-MCNC: 9.2 MG/DL — SIGNIFICANT CHANGE UP (ref 8.4–10.5)
CHLORIDE SERPL-SCNC: 97 MMOL/L — SIGNIFICANT CHANGE UP (ref 96–108)
CO2 SERPL-SCNC: 27 MMOL/L — SIGNIFICANT CHANGE UP (ref 22–31)
CREAT SERPL-MCNC: 1.4 MG/DL — HIGH (ref 0.5–1.3)
GLUCOSE BLDC GLUCOMTR-MCNC: 165 MG/DL — HIGH (ref 70–99)
GLUCOSE BLDC GLUCOMTR-MCNC: 172 MG/DL — HIGH (ref 70–99)
GLUCOSE BLDC GLUCOMTR-MCNC: 211 MG/DL — HIGH (ref 70–99)
GLUCOSE BLDC GLUCOMTR-MCNC: 217 MG/DL — HIGH (ref 70–99)
GLUCOSE SERPL-MCNC: 159 MG/DL — HIGH (ref 70–99)
HCT VFR BLD CALC: 29.7 % — LOW (ref 39–50)
HGB BLD-MCNC: 9.6 G/DL — LOW (ref 13–17)
INR BLD: 0.98 RATIO — SIGNIFICANT CHANGE UP (ref 0.88–1.16)
MAGNESIUM SERPL-MCNC: 1.8 MG/DL — SIGNIFICANT CHANGE UP (ref 1.6–2.6)
MCHC RBC-ENTMCNC: 32 PG — SIGNIFICANT CHANGE UP (ref 27–34)
MCHC RBC-ENTMCNC: 32.3 GM/DL — SIGNIFICANT CHANGE UP (ref 32–36)
MCV RBC AUTO: 99 FL — SIGNIFICANT CHANGE UP (ref 80–100)
NRBC # BLD: 0 /100 WBCS — SIGNIFICANT CHANGE UP (ref 0–0)
PHOSPHATE SERPL-MCNC: 3 MG/DL — SIGNIFICANT CHANGE UP (ref 2.5–4.5)
PLATELET # BLD AUTO: 141 K/UL — LOW (ref 150–400)
POTASSIUM SERPL-MCNC: 4.7 MMOL/L — SIGNIFICANT CHANGE UP (ref 3.5–5.3)
POTASSIUM SERPL-SCNC: 4.7 MMOL/L — SIGNIFICANT CHANGE UP (ref 3.5–5.3)
PROCALCITONIN SERPL-MCNC: 0.87 NG/ML — HIGH (ref 0.02–0.1)
PROT SERPL-MCNC: 5.4 G/DL — LOW (ref 6–8.3)
PROTHROM AB SERPL-ACNC: 11.7 SEC — SIGNIFICANT CHANGE UP (ref 10.6–13.6)
RBC # BLD: 3 M/UL — LOW (ref 4.2–5.8)
RBC # FLD: 19.3 % — HIGH (ref 10.3–14.5)
SODIUM SERPL-SCNC: 134 MMOL/L — LOW (ref 135–145)
TACROLIMUS SERPL-MCNC: 9.3 NG/ML — SIGNIFICANT CHANGE UP
WBC # BLD: 5.04 K/UL — SIGNIFICANT CHANGE UP (ref 3.8–10.5)
WBC # FLD AUTO: 5.04 K/UL — SIGNIFICANT CHANGE UP (ref 3.8–10.5)

## 2020-07-16 PROCEDURE — 99232 SBSQ HOSP IP/OBS MODERATE 35: CPT

## 2020-07-16 PROCEDURE — 99223 1ST HOSP IP/OBS HIGH 75: CPT | Mod: GC

## 2020-07-16 RX ORDER — MEROPENEM 1 G/30ML
1000 INJECTION INTRAVENOUS EVERY 8 HOURS
Refills: 0 | Status: COMPLETED | OUTPATIENT
Start: 2020-07-16 | End: 2020-07-17

## 2020-07-16 RX ORDER — TRAMADOL HYDROCHLORIDE 50 MG/1
25 TABLET ORAL ONCE
Refills: 0 | Status: DISCONTINUED | OUTPATIENT
Start: 2020-07-16 | End: 2020-07-16

## 2020-07-16 RX ORDER — MAGNESIUM SULFATE 500 MG/ML
2 VIAL (ML) INJECTION ONCE
Refills: 0 | Status: COMPLETED | OUTPATIENT
Start: 2020-07-16 | End: 2020-07-16

## 2020-07-16 RX ORDER — QUETIAPINE FUMARATE 200 MG/1
25 TABLET, FILM COATED ORAL ONCE
Refills: 0 | Status: COMPLETED | OUTPATIENT
Start: 2020-07-16 | End: 2020-07-16

## 2020-07-16 RX ORDER — INSULIN LISPRO 100/ML
6 VIAL (ML) SUBCUTANEOUS
Refills: 0 | Status: DISCONTINUED | OUTPATIENT
Start: 2020-07-16 | End: 2020-07-17

## 2020-07-16 RX ORDER — ALBUMIN HUMAN 25 %
100 VIAL (ML) INTRAVENOUS ONCE
Refills: 0 | Status: COMPLETED | OUTPATIENT
Start: 2020-07-16 | End: 2020-07-16

## 2020-07-16 RX ORDER — VALGANCICLOVIR 450 MG/1
900 TABLET, FILM COATED ORAL DAILY
Refills: 0 | Status: DISCONTINUED | OUTPATIENT
Start: 2020-07-16 | End: 2020-07-30

## 2020-07-16 RX ORDER — FUROSEMIDE 40 MG
40 TABLET ORAL ONCE
Refills: 0 | Status: COMPLETED | OUTPATIENT
Start: 2020-07-16 | End: 2020-07-16

## 2020-07-16 RX ORDER — MYCOPHENOLATE MOFETIL 250 MG/1
500 CAPSULE ORAL
Refills: 0 | Status: DISCONTINUED | OUTPATIENT
Start: 2020-07-16 | End: 2020-07-18

## 2020-07-16 RX ADMIN — TACROLIMUS 3 MILLIGRAM(S): 5 CAPSULE ORAL at 20:11

## 2020-07-16 RX ADMIN — Medication 1 TABLET(S): at 11:53

## 2020-07-16 RX ADMIN — TAMSULOSIN HYDROCHLORIDE 0.8 MILLIGRAM(S): 0.4 CAPSULE ORAL at 22:25

## 2020-07-16 RX ADMIN — MIRTAZAPINE 30 MILLIGRAM(S): 45 TABLET, ORALLY DISINTEGRATING ORAL at 22:26

## 2020-07-16 RX ADMIN — PANTOPRAZOLE SODIUM 40 MILLIGRAM(S): 20 TABLET, DELAYED RELEASE ORAL at 07:11

## 2020-07-16 RX ADMIN — INSULIN GLARGINE 10 UNIT(S): 100 INJECTION, SOLUTION SUBCUTANEOUS at 22:26

## 2020-07-16 RX ADMIN — Medication 100000 UNIT(S): at 00:13

## 2020-07-16 RX ADMIN — Medication 81 MILLIGRAM(S): at 11:52

## 2020-07-16 RX ADMIN — Medication 100000 UNIT(S): at 17:10

## 2020-07-16 RX ADMIN — NYSTATIN CREAM 1 APPLICATION(S): 100000 CREAM TOPICAL at 05:40

## 2020-07-16 RX ADMIN — Medication 40 MILLIGRAM(S): at 11:51

## 2020-07-16 RX ADMIN — Medication 6 UNIT(S): at 17:09

## 2020-07-16 RX ADMIN — MYCOPHENOLATE MOFETIL 500 MILLIGRAM(S): 250 CAPSULE ORAL at 08:29

## 2020-07-16 RX ADMIN — Medication 4 UNIT(S): at 08:28

## 2020-07-16 RX ADMIN — Medication 100000 UNIT(S): at 05:40

## 2020-07-16 RX ADMIN — Medication 1 APPLICATION(S): at 11:53

## 2020-07-16 RX ADMIN — Medication 50 GRAM(S): at 11:51

## 2020-07-16 RX ADMIN — Medication 4: at 11:57

## 2020-07-16 RX ADMIN — CHLORHEXIDINE GLUCONATE 1 APPLICATION(S): 213 SOLUTION TOPICAL at 05:39

## 2020-07-16 RX ADMIN — Medication 4: at 17:09

## 2020-07-16 RX ADMIN — TACROLIMUS 3 MILLIGRAM(S): 5 CAPSULE ORAL at 08:29

## 2020-07-16 RX ADMIN — Medication 100000 UNIT(S): at 23:35

## 2020-07-16 RX ADMIN — URSODIOL 300 MILLIGRAM(S): 250 TABLET, FILM COATED ORAL at 05:40

## 2020-07-16 RX ADMIN — Medication 50 MILLILITER(S): at 11:57

## 2020-07-16 RX ADMIN — VALGANCICLOVIR 450 MILLIGRAM(S): 450 TABLET, FILM COATED ORAL at 11:53

## 2020-07-16 RX ADMIN — NYSTATIN CREAM 1 APPLICATION(S): 100000 CREAM TOPICAL at 17:11

## 2020-07-16 RX ADMIN — Medication 3 MILLIGRAM(S): at 22:26

## 2020-07-16 RX ADMIN — URSODIOL 300 MILLIGRAM(S): 250 TABLET, FILM COATED ORAL at 17:34

## 2020-07-16 RX ADMIN — Medication 100000 UNIT(S): at 11:53

## 2020-07-16 RX ADMIN — MEROPENEM 100 MILLIGRAM(S): 1 INJECTION INTRAVENOUS at 22:25

## 2020-07-16 RX ADMIN — MYCOPHENOLATE MOFETIL 500 MILLIGRAM(S): 250 CAPSULE ORAL at 20:10

## 2020-07-16 RX ADMIN — QUETIAPINE FUMARATE 25 MILLIGRAM(S): 200 TABLET, FILM COATED ORAL at 00:49

## 2020-07-16 RX ADMIN — Medication 2: at 08:28

## 2020-07-16 RX ADMIN — Medication 6 UNIT(S): at 11:57

## 2020-07-16 NOTE — PROGRESS NOTE ADULT - SUBJECTIVE AND OBJECTIVE BOX
Podiatry pager #: [phone]/ [phone]    Patient is a 64y old  Male who presents with a chief complaint of Liver failure, hepatic encephalopathy (16 Jul 2020 20:07) podiatry seen for f/u of heel wound right lower extremity       INTERVAL HPI/OVERNIGHT EVENTS:  Patient seen and evaluated at bedside.  Pt is resting comfortable in NAD. Denies N/V/F/C.  Pain rated at X/10    Allergies    codeine (Anaphylaxis)    Intolerances        Vital Signs Last 24 Hrs  T(C): 36.5 (16 Jul 2020 19:00), Max: 36.7 (15 Jul 2020 23:00)  T(F): 97.7 (16 Jul 2020 19:00), Max: 98.1 (15 Jul 2020 23:00)  HR: 90 (16 Jul 2020 21:00) (75 - 97)  BP: 155/62 (16 Jul 2020 21:00) (95/68 - 156/60)  BP(mean): 89 (16 Jul 2020 21:00) (75 - 99)  RR: 14 (16 Jul 2020 21:00) (10 - 36)  SpO2: 98% (16 Jul 2020 21:00) (97% - 100%)    aspirin enteric coated 81 milliGRAM(s) Oral daily  chlorhexidine 2% Cloths 1 Application(s) Topical <User Schedule>  collagenase Ointment 1 Application(s) Topical daily  insulin glargine Injectable (LANTUS) 10 Unit(s) SubCutaneous at bedtime  insulin lispro (HumaLOG) corrective regimen sliding scale   SubCutaneous three times a day before meals  insulin lispro (HumaLOG) corrective regimen sliding scale   SubCutaneous at bedtime  insulin lispro Injectable (HumaLOG) 6 Unit(s) SubCutaneous three times a day with meals  linezolid  IVPB 600 milliGRAM(s) IV Intermittent every 12 hours  magnesium oxide 800 milliGRAM(s) Oral two times a day with meals  melatonin 3 milliGRAM(s) Oral at bedtime  meropenem  IVPB 1000 milliGRAM(s) IV Intermittent every 8 hours  mirtazapine 30 milliGRAM(s) Oral at bedtime  mycophenolate mofetil 500 milliGRAM(s) Oral <User Schedule>  nystatin    Suspension 239083 Unit(s) Swish and Swallow four times a day  nystatin Cream 1 Application(s) Topical two times a day  pantoprazole    Tablet 40 milliGRAM(s) Oral before breakfast  predniSONE   Tablet 5 milliGRAM(s) Oral daily  tacrolimus 3 milliGRAM(s) Oral <User Schedule>  tamsulosin 0.8 milliGRAM(s) Oral at bedtime  trimethoprim   80 mG/sulfamethoxazole 400 mG 1 Tablet(s) Oral daily  ursodiol Capsule 300 milliGRAM(s) Oral two times a day  valGANciclovir 900 milliGRAM(s) Oral daily      LABS:                        9.6    5.04  )-----------( 141      ( 16 Jul 2020 07:12 )             29.7     07-16    134<L>  |  97  |  31<H>  ----------------------------<  159<H>  4.7   |  27  |  1.40<H>    Ca    9.2      16 Jul 2020 07:12  Phos  3.0     07-16  Mg     1.8     07-16    TPro  5.4<L>  /  Alb  2.8<L>  /  TBili  1.2  /  DBili  0.5<H>  /  AST  16  /  ALT  50<H>  /  AlkPhos  154<H>  07-16    PT/INR - ( 16 Jul 2020 07:12 )   PT: 11.7 sec;   INR: 0.98 ratio         PTT - ( 16 Jul 2020 07:12 )  PTT:29.6 sec    CAPILLARY BLOOD GLUCOSE      POCT Blood Glucose.: 211 mg/dL (16 Jul 2020 17:07)  POCT Blood Glucose.: 217 mg/dL (16 Jul 2020 11:50)  POCT Blood Glucose.: 172 mg/dL (16 Jul 2020 08:25)      Lower Extremity Physical Exam:  Vascular: DP 2/4 PT 1/4 B/L, CFT <3 seconds B/L, Temperature gradient warm to cool B/L  Neuro: Epicritic sensation diminished to the level of heel B/L  Musculoskeletal/Ortho: no gross deformities  Skin:   Wound #1: right foot  Location: posterior heel  Size: 6 x 4 cm  Depth: subQ  Wound bed: fibrogranular tissue   Drainage: none  Odor: none  Periwound: no clinical signs of infection  Etiology: pressure, diabetes  RADIOLOGY & ADDITIONAL TESTS:

## 2020-07-16 NOTE — PROGRESS NOTE ADULT - SUBJECTIVE AND OBJECTIVE BOX
Diabetes Follow up note:    Chief complaint: T2DM s/p Liver xplant    Interval Hx: Remains in SICU. BG values 170s-mid 200s over past 24 hours. Pt seen oob to chair today. Tolerating POs and continues to endorse very good appetite. Remains on 1:1 for intermittent agitation.     Review of Systems:  General: denies pain  GI: Tolerating POs. Denies N/V/D/Abd pain  CV: Denies CP/SOB  ENDO: No S&Sx of hypoglycemia  MEDS:  insulin glargine Injectable (LANTUS) 10 Unit(s) SubCutaneous at bedtime  insulin lispro (HumaLOG) corrective regimen sliding scale   SubCutaneous three times a day before meals  insulin lispro (HumaLOG) corrective regimen sliding scale   SubCutaneous at bedtime  insulin lispro Injectable (HumaLOG) 6 Unit(s) SubCutaneous three times a day with meals  predniSONE   Tablet 5 milliGRAM(s) Oral daily    linezolid  IVPB 600 milliGRAM(s) IV Intermittent every 12 hours  meropenem  IVPB 1000 milliGRAM(s) IV Intermittent every 8 hours  nystatin    Suspension 124347 Unit(s) Swish and Swallow four times a day  trimethoprim   80 mG/sulfamethoxazole 400 mG 1 Tablet(s) Oral daily  valGANciclovir 450 milliGRAM(s) Oral daily    Allergies    codeine (Anaphylaxis)        PE:  General: Male sitting in chair. NAD>   Vital Signs Last 24 Hrs  T(C): 36.2 (16 Jul 2020 11:00), Max: 37 (15 Jul 2020 15:00)  T(F): 97.2 (16 Jul 2020 11:00), Max: 98.6 (15 Jul 2020 15:00)  HR: 82 (16 Jul 2020 12:00) (75 - 92)  BP: 138/70 (16 Jul 2020 12:00) (95/68 - 156/60)  BP(mean): 98 (16 Jul 2020 12:00) (74 - 99)  RR: 34 (16 Jul 2020 12:00) (10 - 34)  SpO2: 100% (16 Jul 2020 12:00) (91% - 100%)  Abd: Soft, NT,ND, Obese.   Extremities: Warm  Neuro: A&O X3    LABS:  POCT Blood Glucose.: 217 mg/dL (07-16-20 @ 11:50)  POCT Blood Glucose.: 172 mg/dL (07-16-20 @ 08:25)  POCT Blood Glucose.: 195 mg/dL (07-15-20 @ 21:42)  POCT Blood Glucose.: 249 mg/dL (07-15-20 @ 17:38)  POCT Blood Glucose.: 239 mg/dL (07-15-20 @ 12:40)  POCT Blood Glucose.: 108 mg/dL (07-15-20 @ 08:36)  POCT Blood Glucose.: 279 mg/dL (07-14-20 @ 21:21)  POCT Blood Glucose.: 245 mg/dL (07-14-20 @ 15:36)  POCT Blood Glucose.: 197 mg/dL (07-14-20 @ 11:45)  POCT Blood Glucose.: 90 mg/dL (07-14-20 @ 08:18)  POCT Blood Glucose.: 151 mg/dL (07-13-20 @ 21:10)  POCT Blood Glucose.: 100 mg/dL (07-13-20 @ 17:28)  POCT Blood Glucose.: 80 mg/dL (07-13-20 @ 16:58)  POCT Blood Glucose.: 62 mg/dL (07-13-20 @ 16:42)  POCT Blood Glucose.: 65 mg/dL (07-13-20 @ 16:42)  POCT Blood Glucose.: 172 mg/dL (07-13-20 @ 13:30)                            9.6    5.04  )-----------( 141      ( 16 Jul 2020 07:12 )             29.7       07-16    134<L>  |  97  |  31<H>  ----------------------------<  159<H>  4.7   |  27  |  1.40<H>    Ca    9.2      16 Jul 2020 07:12  Phos  3.0     07-16  Mg     1.8     07-16    TPro  5.4<L>  /  Alb  2.8<L>  /  TBili  1.2  /  DBili  0.5<H>  /  AST  16  /  ALT  50<H>  /  AlkPhos  154<H>  07-16      A1C with Estimated Average Glucose Result: 5.2 % (06-14-20 @ 11:00)  A1C with Estimated Average Glucose Result: 5.4 % (06-12-20 @ 08:26)  A1C with Estimated Average Glucose Result: 4.8 % (04-26-20 @ 09:39)  Hemoglobin A1C, Whole Blood: 6.4 % (02-12-20 @ 17:08)  Hemoglobin A1C, Whole Blood: 5.8 % (12-04-19 @ 08:38)          Contact number: beeper [phone] or [phone]

## 2020-07-16 NOTE — PROGRESS NOTE ADULT - ASSESSMENT
63 yo M with hx of T2DM uncontrolled due to steroids x 10 years with neuropathy and R DFU of the heel, HTN, HLD, MGUS, and decompensated JEAN cirrhosis s/p liver x-plant on 7/2/2020 admitted 6/11/2020 with confusion secondary to liver failure and encephalopathy. Endocrine consulted for glycemic control in the setting of steroid use now on Prednisone 5mg daily. Tolerating POs w/BG values above goal during the day. BG goal (100-180mg/dl).

## 2020-07-16 NOTE — PROGRESS NOTE ADULT - ASSESSMENT
Assessment and Plan:   · Assessment		  64M w/ R posterior heel wound, stable  -Pt seen and evaluated  -R posterior heel wound to subQ stable w/ no signs of infection  -XR negative for gas or OM  -No podiatric surgical intervention, will continue to monitor for signs of infection and wound care recommendations  --continue with medihoney and dsd daily and decubitus precautions

## 2020-07-16 NOTE — PROGRESS NOTE ADULT - ATTENDING COMMENTS
S/p renal transplant, recovering well  Continue nocturnal Remeron, received Melatonin and Seroquel for sleep/delirium last night   Hemodynamically stable saturating well on RA, refusion NIV  PO, hepatic function improved  Daily Protonix, HCT stable  Abx Zyvox and Meropenem, repeat cultures including culture from  groin collection neg so far, afebrile, no leucocytosis  ISS/Lantus dose adjustment   Transplant meds  PT/mobilization  Discussed with transplant team

## 2020-07-16 NOTE — PROGRESS NOTE ADULT - SUBJECTIVE AND OBJECTIVE BOX
Transplant Surgery Progress Note  [facility]      Date:  7/2/2020       POD#14    Present: Patient seen with multidisciplinary team including Transplant Surgeon: Dr. Alonzo, Transplant Nephrologist Dr. Salvador, Hepatology Dr. Mo, surgery resident OLY Quezada during am rounds and examined with Dr. Alonzo. Disciplines not in attendance will be notified of the plan.     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     Re-admitted on 6/11:   ·	worsening HE  ·	UTI/VRE: tx with linezolid 6/15-22, bactrim DS 6/22 - 7/2  ·	MISA/Hyponatremia  ·	UGI bleed (melena) s/p EGD (6/22) c/w hemorrhagic duodenitis/gastritis; Grade 2 EV; requiring multiple transfusions  ·	Known h/o R foot ulcer (MRI neg for OM)  ·	s/p OLTx on 7/2/2020, post op liver doppler with patent vessels  ·	post op course c/b delirium and VRE bacteremia (7/10)     Interval events:  - POD 14 s/p OLT with good graft function;  LFTs trending down: ALk Phos 154 (185), AST 16 (24), ALT 50 (70), T bili 1.2   - Received Seroquel and Remeron last night; remained agitated/confused, started on Precedex gtt, off this am   - This morning, mental status better, AO x 3, following commands,   - off Precedex overnight  -pysch recs appreciated given seroquel, Remeron and melatonin   - pain well controlled   - OOB in chair   - tolerating regular diet   O:  Vital Signs Last 24 Hrs  T(C): 35.6 (16 Jul 2020 07:00), Max: 37 (15 Jul 2020 11:00)  T(F): 96.1 (16 Jul 2020 07:00), Max: 98.6 (15 Jul 2020 11:00)  HR: 84 (16 Jul 2020 10:00) (75 - 92)  BP: 104/58 (16 Jul 2020 10:00) (95/68 - 156/60)  BP(mean): 76 (16 Jul 2020 10:00) (74 - 99)  RR: 12 (16 Jul 2020 10:00) (10 - 27)  SpO2: 100% (16 Jul 2020 10:00) (91% - 100%)    I&O's Detail    15 Jul 2020 07:01  -  16 Jul 2020 07:00  --------------------------------------------------------  IN:    Oral Fluid: 1360 mL    Solution: 100 mL    Solution: 600 mL  Total IN: 2060 mL    OUT:    Indwelling Catheter - Urethral: 2615 mL  Total OUT: 2615 mL    Total NET: -555 mL      16 Jul 2020 07:01  -  16 Jul 2020 10:15  --------------------------------------------------------  IN:    Oral Fluid: 236 mL  Total IN: 236 mL    OUT:    Indwelling Catheter - Urethral: 95 mL  Total OUT: 95 mL    Total NET: 141 mL          MEDICATIONS  (STANDING):  aspirin enteric coated 81 milliGRAM(s) Oral daily  chlorhexidine 2% Cloths 1 Application(s) Topical <User Schedule>  collagenase Ointment 1 Application(s) Topical daily  insulin glargine Injectable (LANTUS) 10 Unit(s) SubCutaneous at bedtime  insulin lispro (HumaLOG) corrective regimen sliding scale   SubCutaneous three times a day before meals  insulin lispro (HumaLOG) corrective regimen sliding scale   SubCutaneous at bedtime  insulin lispro Injectable (HumaLOG) 4 Unit(s) SubCutaneous three times a day before meals  linezolid  IVPB 600 milliGRAM(s) IV Intermittent every 12 hours  magnesium oxide 800 milliGRAM(s) Oral two times a day with meals  melatonin 3 milliGRAM(s) Oral at bedtime  meropenem  IVPB 1000 milliGRAM(s) IV Intermittent every 8 hours  mirtazapine 30 milliGRAM(s) Oral at bedtime  mycophenolate mofetil 500 milliGRAM(s) Oral <User Schedule>  nystatin    Suspension 162167 Unit(s) Swish and Swallow four times a day  nystatin Cream 1 Application(s) Topical two times a day  pantoprazole    Tablet 40 milliGRAM(s) Oral before breakfast  predniSONE   Tablet 5 milliGRAM(s) Oral daily  tacrolimus 3 milliGRAM(s) Oral <User Schedule>  tamsulosin 0.8 milliGRAM(s) Oral at bedtime  trimethoprim   80 mG/sulfamethoxazole 400 mG 1 Tablet(s) Oral daily  ursodiol Capsule 300 milliGRAM(s) Oral two times a day  valGANciclovir 450 milliGRAM(s) Oral daily    MEDICATIONS  (PRN):                            9.6    5.04  )-----------( 141      ( 16 Jul 2020 07:12 )             29.7       07-16    134<L>  |  97  |  31<H>  ----------------------------<  159<H>  4.7   |  27  |  1.40<H>    Ca    9.2      16 Jul 2020 07:12  Phos  3.0     07-16  Mg     1.8     07-16    TPro  5.4<L>  /  Alb  2.8<L>  /  TBili  1.2  /  DBili  0.5<H>  /  AST  16  /  ALT  50<H>  /  AlkPhos  154<H>  07-16      Physical Exam:  Gen: Laying in bed, NAD, alert and oriented.   Resp: Unlabored breathing, lungs CTAB  CV: RRR  Abd: soft, NT, ND, incision c/d/i, T-tube tied, ANDREINA site with serous drainage   Extremities: 2+ palpable DP pulses b/l, heel wound without drainage or purulent material, edema improving

## 2020-07-16 NOTE — PROGRESS NOTE ADULT - SUBJECTIVE AND OBJECTIVE BOX
INTERVAL HPI/OVERNIGHT EVENTS:    events noted  still tremulous but continues to improve         MEDICATIONS  (STANDING):  aspirin enteric coated 81 milliGRAM(s) Oral daily  chlorhexidine 2% Cloths 1 Application(s) Topical <User Schedule>  collagenase Ointment 1 Application(s) Topical daily  furosemide    Tablet 80 milliGRAM(s) Oral every 8 hours  insulin glargine Injectable (LANTUS) 16 Unit(s) SubCutaneous at bedtime  insulin lispro (HumaLOG) corrective regimen sliding scale   SubCutaneous three times a day before meals  insulin lispro (HumaLOG) corrective regimen sliding scale   SubCutaneous at bedtime  insulin lispro Injectable (HumaLOG) 10 Unit(s) SubCutaneous before dinner  insulin lispro Injectable (HumaLOG) 8 Unit(s) SubCutaneous before breakfast  insulin lispro Injectable (HumaLOG) 8 Unit(s) SubCutaneous before lunch  linezolid  IVPB 600 milliGRAM(s) IV Intermittent every 12 hours  magnesium oxide 800 milliGRAM(s) Oral two times a day with meals  meropenem  IVPB 1000 milliGRAM(s) IV Intermittent every 8 hours  nystatin    Suspension 802181 Unit(s) Swish and Swallow four times a day  nystatin Cream 1 Application(s) Topical two times a day  pantoprazole  Injectable 40 milliGRAM(s) IV Push every 12 hours  polyethylene glycol 3350 17 Gram(s) Oral daily  predniSONE   Tablet 5 milliGRAM(s) Oral daily  senna 2 Tablet(s) Oral at bedtime  tacrolimus 3 milliGRAM(s) Oral <User Schedule>  tacrolimus 2 milliGRAM(s) Oral <User Schedule>  tamsulosin 0.8 milliGRAM(s) Oral at bedtime  trimethoprim   80 mG/sulfamethoxazole 400 mG 1 Tablet(s) Oral daily  valGANciclovir 450 milliGRAM(s) Oral daily    MEDICATIONS  (PRN):  glucagon  Injectable 1 milliGRAM(s) IntraMuscular once PRN Glucose <70 milliGRAM(s)/deciLiter      Allergies    codeine (Anaphylaxis)    Intolerances        Review of Systems:    General:  No wt loss, fevers, chills, night sweats,fatigue,   Eyes:  Good vision, no reported pain  ENT:  No sore throat, pain, runny nose, dysphagia  CV:  No pain, palpitatioins, hypo/hypertension  Resp:  No dyspnea, cough, tachypnea, wheezing  GI:  No pain, No nausea, No vomiting, No diarrhea, No constipatiion, No weight loss, No fever, No pruritis, No rectal bleeding, No tarry stools, No dysphagia,  :  No pain, bleeding, incontinence, nocturia  Muscle:  No pain, weakness  Neuro:  No weakness, tingling, memory problems  Psych:  No fatigue, insomnia, mood problems, depression  Endocrine:  No polyuria, polydypsia, cold/heat intolerance  Heme:  No petechiae, ecchymosis, easy bruisability  Skin:  No rash, tattoos, scars, edema      Vital Signs Last 24 Hrs  T(C): 36.3 (12 Jul 2020 15:00), Max: 36.8 (11 Jul 2020 19:00)  T(F): 97.3 (12 Jul 2020 15:00), Max: 98.2 (11 Jul 2020 19:00)  HR: 80 (12 Jul 2020 15:00) (72 - 83)  BP: 109/59 (12 Jul 2020 15:00) (94/55 - 154/68)  BP(mean): 78 (12 Jul 2020 15:00) (70 - 125)  RR: 15 (12 Jul 2020 15:00) (9 - 28)  SpO2: 96% (12 Jul 2020 15:00) (96% - 100%)    PHYSICAL EXAM:    Constitutional: NAD  HEENT: sclera anicteric   Neck: No LAD, supple  Gastrointestinal: BS+, soft, ND, +incisional tenderness, incisions c/d/i  Extremities: +b/l peripheral edema  Neurological: awake, alert, +tremors - improving     LABS:                        9.3    5.51  )-----------( 98       ( 12 Jul 2020 01:54 )             28.6     07-12    131<L>  |  95<L>  |  31<H>  ----------------------------<  217<H>  5.0   |  27  |  1.26    Ca    8.1<L>      12 Jul 2020 12:22  Phos  4.0     07-12  Mg     2.0     07-12    TPro  4.4<L>  /  Alb  2.6<L>  /  TBili  1.1  /  DBili  0.5<H>  /  AST  64<H>  /  ALT  98<H>  /  AlkPhos  212<H>  07-12    PT/INR - ( 12 Jul 2020 01:54 )   PT: 12.7 sec;   INR: 1.12 ratio         PTT - ( 12 Jul 2020 01:54 )  PTT:28.7 sec      RADIOLOGY & ADDITIONAL TESTS:

## 2020-07-16 NOTE — PROGRESS NOTE ADULT - ASSESSMENT
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    [] POD 14 s/p OLT   - Good graft function, LFTs trending down  - Liver doppler (7/12) with patent vessels  - Immuno: FK 3mg bid, Pred 5mg daily  - PPX: valcyte/nystatin/bactrim  - Continue ASA 81mg daily  - LE edema: 40mg of lasix today with 25% albumin x1   - h/o UGI bleed: Continue Protonix 40mg IV bid, stable H/H    - Cont Mag 800mg BID, keep Mag >2  - Regular, carbohydrate restricted diet  - Pain control PRN  -cellcept at 500mg BID   - pysch recs appreciated   - DVT PPx: SCDs at all times  -d/c bob     [] VRE bacteremia (7/10)  - ID following: on meropenem/linezolid; plan to dc meropenem pending cx from L groin aspiration  - Bld cx from 7/12 with NGTD, L groin collection aspirated (7/13) - cx pending     [] Hyperglycemia  - Appreciate endocrinology recommendations  - Cont Lantus and Humalog insulin wih SSI coverage  - Fingersticks ac and qhs    [] Heel ulcer  Continue local wound care w/ santyl and DSD as per podiatry    Continue ICU care

## 2020-07-16 NOTE — PROGRESS NOTE ADULT - ASSESSMENT
64 M hx of DM, HLD, GERD, HFpEF with mild LV diastolic dysfunction, and decompensated JEAN cirrhosis c/b ascites with hx of SBP, HE, duodenal ulcer, GAVE and duodenal AVM s/p APC (last on 10/11/19), [facility]OS listed for liver transplantation at [facility]. 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 He is now re-admitted with hepatic encephalopathy and VRE UTI, also with MISA on CKD. S/p liver transplant 7/2.    Impression:  #S/p liver transplant 7/2: liver enzymes continue to downtrend  OR details:  - L groin cutdown for vv bypass   - anastomosis: bicaval end-end/CBD duct-duct/HA & PV end-end, all standard anatomy.    - IOC with good flow  - Simulect induction. 500mg Solumedrol  - JPs x3 with T-Tube  - 14U pRBC, 14U FFP, 10 PLT, 11 CRYO, 500mL returned from cell saver, EBL 5L, UOP 1100mL  Recipient Info:   ABO: A  CMV:  Neg  EBV Positive  Donor:  Donor ID:  [id number] Match: [id number]  Age: 29 ABO:  A1 High Risk:   No COD: Cardiovascular  Anti CMV positive, EBV IgG- positive  HepBcAb-neg, Hepatitis C-DANA- neg, Hepatitis C ab-neg  # Elevated liver enzymes: resolving. Possible secondary to DILI vs sepsis related. Has VRE and on linezolid and meropenem.  # VRE on culture from 7/10: most recent culture negative from 7/12. On mari and linezolid. CT showing groin hematoma, s/p aspiration without c/f infection on studies.  #Tremors: improved but ongoing symptoms c/f taco toxicity. May need medication change based on clinical course  #Halluciniations/agitation: remaints agitated with issues in sleep wake cycle. Breifly on precedex. Resolved hallucinations  #GI bleed - Hgb stable. No overt bleeding. 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 GAVE and PHG should have improved w/ liver transplant, most likely etiology is NG tube trauma prior if any c/f GI bleed  # Abnormal Chest X-Ray c/f evolving pna vs atelectasis: now on meropenem and linezolid. No signs of active infection  # Hypertension on nifedipine now. BP improved  # Lower ext edema: diuresing well, improving on lasix  #R posterior heel wound w/ overlying eschar –Local wound care. no signs of infections, XR neg for gas, evaluated by podiatry and ID. MRI w/o contrast limited, but no overt signs of active infection.  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   # Hyperglycemia with episodes of overcorrection    Recommendation:  - meropenem/linezolid per transplant ID; plan to d/c meropenem per ID/surgical team  - physical therapy daily to get OOB and ambulating  - continue with standing magnesium  - diurese with lasix 40mg IV once today, please give albumin with lasix  - monitor renal function  - prednisone 5 mg po daily  - Cellcept 500mg BID  - monitor prior ANDREINA sites for leaking, dermabond area if needed  - continue with tacro dosing per transplant surgery  - consider switch to CsA given ongoing neurotoxicity from tacro  - psych consult for agitation/delerium eval and management; medication to be approved by transplant team  - glycemic control   - c/w nystatin, Bactrim, valcyte ppx  - trend Hb- PO PPI BID, monitor bowel movements  - aspirin per surgery  - diet as tolerated  - wound care/nursing for R heel ulcer  - agitation precaution  - f/u transplant surgery recs  - rest of care per SICU team  - transplant hepatology will follow

## 2020-07-16 NOTE — PROGRESS NOTE ADULT - ASSESSMENT
#PENDING RECS. PLEASE WAIT FOR FINAL RECS AFTER DISCUSSION WITH ATTENDING#    Assessment  #VRE Bacteremia, L Groin Collection, Encephalopathy  - Abd US showed 9cm Left groin collection: likely represents a seroma or lymphocele; aspirated - cx: PMN seen, no organism seen  - Continue Linezolid 600 mg IV Q12H  - Continue monitoring tacrolimus levels  - TTE negative for endocarditis    #Hypoxic Respiratory Failure, Encephalopathy  - No evidence of pneumonia and collection in left groin appears consistent with seroma  - Can discontinue Meropenem    #s/p OLT   - Continue bactrim, valcyte for prophylaxis   - Monitor patient for tremors - monitor tacrolimus dose   - Removal of drains and lines as per surgical transplant team    #Right heel wound  --Podiatry on board    --Local wound care     Suyapa Mayer MD, PGY4   ID fellow  Pager: [phone]  After 5pm/weekends call [phone] Assessment  #VRE Bacteremia, L Groin Collection, Encephalopathy  - Abd US showed 9cm Left groin collection: likely represents a seroma or lymphocele; aspirated - cx: PMN seen, no organism seen  - Continue Linezolid 600 mg IV Q12H  - Continue monitoring tacrolimus levels  - TTE negative for endocarditis    #Hypoxic Respiratory Failure, Encephalopathy  - No evidence of pneumonia and collection in left groin appears consistent with seroma  - Can discontinue Meropenem    #s/p OLT   - Continue bactrim, valcyte for prophylaxis   - Monitor patient for tremors - monitor tacrolimus dose   - Removal of drains and lines as per surgical transplant team  - noticed CMV status: D+/R- (high risk), valganciclovir dose can increase to 900mg daily    #Right heel wound  --Podiatry on board    --Local wound care     Suyapa Maeyr MD, PGY4   ID fellow  Pager: [phone]  After 5pm/weekends call [phone]    d/w Dr. Pérez Assessment  #VRE Bacteremia, L Groin Collection, Encephalopathy  - Abd US showed 9cm Left groin collection: likely represents a seroma or lymphocele; aspirated - cx: PMN seen, no organism seen  - Continue Linezolid 600 mg IV Q12H  - Continue monitoring tacrolimus levels  - TTE negative for endocarditis    #Hypoxic Respiratory Failure, Encephalopathy  - No evidence of pneumonia and collection in left groin appears consistent with seroma  - Can discontinue Meropenem (see attending attestation below)    #s/p OLT   - Continue bactrim, valcyte for prophylaxis   - Monitor patient for tremors - monitor tacrolimus dose   - Removal of drains and lines as per surgical transplant team  - CMV status: D+/R- (high risk), recommend valganciclovir dose increase to 900mg daily    #Right heel wound  --Podiatry on board    --Local wound care     Suyapa Mayer MD, PGY4   ID fellow  Pager: [phone]  After 5pm/weekends call [phone]    d/w Dr. Pérez

## 2020-07-16 NOTE — PROGRESS NOTE ADULT - SUBJECTIVE AND OBJECTIVE BOX
HISTORY  64y Male c/b small esophageal varices (12/2019), portal hypertensive gastropathy, ascites, hx SBP, hx hepatic encephalopathy, GAVE syndrome s/p APC, hx duodenal ulcer (5/2019), HTN, HLD, DM, HFpEF (EF 70%), recent ESBL E coli prostate abscess (3/2020 s/p 4 weeks ertapenem), orthostatic hypotension ( on midodrine) who was initially admitted to medicine for hepatic encephalopathy. Hospital course c/b MISA, VRE UTI, acute blood loss anemia, received blood transfusion, EGD done 6/22 with Grade II esophageal varices, acute gastritis with hemorrhage and acute duodenitis with hemorrhage. Patient had worsening mental status, concerning decompensating liver cirrhosis, hepatic encephalopathy patient then transferred to SICU for further care. He is now s/p OLT complicated by delerium, bacteremia, hyper/hypoglycemia.      24 HOUR EVENTS:  - Pt slept better last night after treatment with 50mg Seroquel x1 during day, 30mg Remeron, Precedex gtt (1.2) overnight for increasing delirium  - Psychiatry consult obtained for ICU delirum   - Lantus decreased to 10 units and Humalog pre-meal started at 4 units as per endocrine recommendations  - Protonix switched to 40 PO qd  - Final ANDREINA removed yesterday by patient  - Diursed with 80mg. of IV lasix     SUBJECTIVE/ROS:  [ ] A ten-point review of systems was otherwise negative except as noted.  [ ] Due to altered mental status/intubation, subjective information were not able to be obtained from the patient. History was obtained, to the extent possible, from review of the chart and collateral sources of information.      NEURO  Exam: awake, alert, oriented  Meds: melatonin 3 milliGRAM(s) Oral at bedtime  mirtazapine 30 milliGRAM(s) Oral at bedtime  QUEtiapine 25 milliGRAM(s) Oral once    [x] Adequacy of sedation and pain control has been assessed and adjusted      RESPIRATORY  RR: 18 (07-15-20 @ 23:00) (10 - 36)  SpO2: 98% (07-15-20 @ 23:54) (91% - 100%)  Exam: unlabored, clear to auscultation bilaterally  Mechanical Ventilation: none  [N/A] Extubation Readiness Assessed  Meds: none      CARDIOVASCULAR  HR: 85 (07-15-20 @ 23:54) (57 - 92)  BP: 130/62 (07-15-20 @ 23:00) (100/71 - 147/69)  BP(mean): 89 (07-15-20 @ 23:00) (74 - 96)  Exam: regular rate and rhythm  Cardiac Rhythm: sinus  Perfusion     [x]Adequate   [ ]Inadequate  Mentation   [x]Normal       [ ]Reduced  Extremities  [x]Warm         [ ]Cool  Volume Status [ ]Hypervolemic [x]Euvolemic [ ]Hypovolemic  Meds: tamsulosin 0.8 milliGRAM(s) Oral at bedtime        GI/NUTRITION  Exam: soft, nontender, nondistended  Diet: consistent carb diet   Meds: pantoprazole    Tablet 40 milliGRAM(s) Oral before breakfast  ursodiol Capsule 300 milliGRAM(s) Oral two times a day      GENITOURINARY  I&O's Detail    07-14 @ 07:01  -  07-15 @ 07:00  --------------------------------------------------------  IN:    dexmedetomidine Infusion: 41.6 mL    Oral Fluid: 1380 mL    Solution: 400 mL    Solution: 300 mL  Total IN: 2121.6 mL    OUT:    Indwelling Catheter - Urethral: 2340 mL  Total OUT: 2340 mL    Total NET: -218.4 mL      07-15 @ 07:01  -  07-16 @ 00:47  --------------------------------------------------------  IN:    Oral Fluid: 1000 mL    Solution: 50 mL    Solution: 600 mL  Total IN: 1650 mL    OUT:    Indwelling Catheter - Urethral: 1735 mL  Total OUT: 1735 mL    Total NET: -85 mL          07-15    138  |  99  |  28<H>  ----------------------------<  74  4.3   |  32<H>  |  1.30    Ca    9.3      15 Jul 2020 07:27  Phos  3.4     07-15  Mg     2.0     07-15    TPro  5.7<L>  /  Alb  2.9<L>  /  TBili  1.3<H>  /  DBili  0.5<H>  /  AST  24  /  ALT  70<H>  /  AlkPhos  185<H>  07-15    [ ] Beasley catheter, indication: N/A  Meds: magnesium oxide 800 milliGRAM(s) Oral two times a day with meals        HEMATOLOGIC  Meds: aspirin enteric coated 81 milliGRAM(s) Oral daily    [x] VTE Prophylaxis                        10.4   4.31  )-----------( 149      ( 15 Jul 2020 07:28 )             32.4     PT/INR - ( 15 Jul 2020 07:28 )   PT: 12.0 sec;   INR: 1.01 ratio         PTT - ( 15 Jul 2020 07:28 )  PTT:29.7 sec  Transfusion     [ ] PRBC   [ ] Platelets   [ ] FFP   [ ] Cryoprecipitate      INFECTIOUS DISEASES  WBC Count: 4.31 K/uL (07-15 @ 07:28)    RECENT CULTURES:  Specimen Source: .Body Fluid Abdominal Fluid  Date/Time: 07-14 @ 00:32  Culture Results:   No growth  Gram Stain:   polymorphonuclear leukocytes seen  No organisms seen  by cytocentrifuge  Organism: --  Specimen Source: .Blood Blood-Peripheral  Date/Time: 07-12 @ 10:04  Culture Results:   No growth to date.  Gram Stain: --  Organism: --  Specimen Source: .Urine Catheterized  Date/Time: 07-11 @ 03:14  Culture Results:   No growth  Gram Stain: --  Organism: --    Meds: linezolid  IVPB 600 milliGRAM(s) IV Intermittent every 12 hours  meropenem  IVPB 1000 milliGRAM(s) IV Intermittent every 8 hours  mycophenolate mofetil 500 milliGRAM(s) Oral <User Schedule>  nystatin    Suspension 739896 Unit(s) Swish and Swallow four times a day  tacrolimus 3 milliGRAM(s) Oral <User Schedule>  trimethoprim   80 mG/sulfamethoxazole 400 mG 1 Tablet(s) Oral daily  valGANciclovir 450 milliGRAM(s) Oral daily        ENDOCRINE  CAPILLARY BLOOD GLUCOSE      POCT Blood Glucose.: 195 mg/dL (15 Jul 2020 21:42)  POCT Blood Glucose.: 249 mg/dL (15 Jul 2020 17:38)  POCT Blood Glucose.: 239 mg/dL (15 Jul 2020 12:40)  POCT Blood Glucose.: 108 mg/dL (15 Jul 2020 08:36)    Meds: insulin glargine Injectable (LANTUS) 10 Unit(s) SubCutaneous at bedtime  insulin lispro (HumaLOG) corrective regimen sliding scale   SubCutaneous three times a day before meals  insulin lispro (HumaLOG) corrective regimen sliding scale   SubCutaneous at bedtime  insulin lispro Injectable (HumaLOG) 4 Unit(s) SubCutaneous three times a day before meals  predniSONE   Tablet 5 milliGRAM(s) Oral daily        ACCESS DEVICES:  [x] Peripheral IV  [ ] Central Venous Line	[ ] R	[ ] L	[ ] IJ	[ ] Fem	[ ] SC	Placed:   [ ] Arterial Line		[ ] R	[ ] L	[ ] Fem	[ ] Rad	[ ] Ax	Placed:   [ ] PICC:					[ ] Mediport  [ ] Urinary Catheter, Date Placed:   [x] Necessity of urinary, arterial, and venous catheters discussed    OTHER MEDICATIONS:  chlorhexidine 2% Cloths 1 Application(s) Topical <User Schedule>  collagenase Ointment 1 Application(s) Topical daily  nystatin Cream 1 Application(s) Topical two times a day      CODE STATUS: full code       IMAGING: < from: CT Chest w/ IV Cont (07.11.20 @ 16:57) >  IMPRESSION:   Small bilateral pleural effusions, right greater than left, with associated compressive atelectasis.    Status post liver transplant.     Small volume ascites.    Varices again noted.    Nonobstructing intrarenal calculi.      < end of copied text >

## 2020-07-16 NOTE — PROGRESS NOTE ADULT - PROBLEM SELECTOR PLAN 3
-defer statin in the setting of recent liver x-plant.     discussed w/pt and team  pager: [phone]   office: [phone]    -I spent a total time of 25 mins with the patient at bedside/on unit of which more than 50% of time was spent on counseling/coordination of care.

## 2020-07-16 NOTE — PROGRESS NOTE ADULT - SUBJECTIVE AND OBJECTIVE BOX
Chief Complaint:  Patient is a 64y old  Male who presents with a chief complaint of Liver failure, hepatic encephalopathy (2020 10:07)      Interval Events: Pt with trouble sleeping overnight. Received Seroquel, melatonin and Remeron last night but remained agitated/confused, improved in the AM.    Allergies:  codeine (Anaphylaxis)      Hospital Medications:  aspirin enteric coated 81 milliGRAM(s) Oral daily  chlorhexidine 2% Cloths 1 Application(s) Topical <User Schedule>  collagenase Ointment 1 Application(s) Topical daily  insulin glargine Injectable (LANTUS) 10 Unit(s) SubCutaneous at bedtime  insulin lispro (HumaLOG) corrective regimen sliding scale   SubCutaneous three times a day before meals  insulin lispro (HumaLOG) corrective regimen sliding scale   SubCutaneous at bedtime  insulin lispro Injectable (HumaLOG) 6 Unit(s) SubCutaneous three times a day with meals  linezolid  IVPB 600 milliGRAM(s) IV Intermittent every 12 hours  magnesium oxide 800 milliGRAM(s) Oral two times a day with meals  melatonin 3 milliGRAM(s) Oral at bedtime  meropenem  IVPB 1000 milliGRAM(s) IV Intermittent every 8 hours  mirtazapine 30 milliGRAM(s) Oral at bedtime  mycophenolate mofetil 500 milliGRAM(s) Oral <User Schedule>  nystatin    Suspension 875104 Unit(s) Swish and Swallow four times a day  nystatin Cream 1 Application(s) Topical two times a day  pantoprazole    Tablet 40 milliGRAM(s) Oral before breakfast  predniSONE   Tablet 5 milliGRAM(s) Oral daily  tacrolimus 3 milliGRAM(s) Oral <User Schedule>  tamsulosin 0.8 milliGRAM(s) Oral at bedtime  trimethoprim   80 mG/sulfamethoxazole 400 mG 1 Tablet(s) Oral daily  ursodiol Capsule 300 milliGRAM(s) Oral two times a day  valGANciclovir 450 milliGRAM(s) Oral daily      PMHX/PSHX:  GIB (gastrointestinal bleeding)  GERD with esophagitis  Hepatic encephalopathy  Obesity  Fatty liver disease, nonalcoholic  Renal stones  Hypertension  Neuropathy  Hypercholesteremia  Diabetes  S/P cholecystectomy  No significant past surgical history      Family history:  Family history of type 2 diabetes mellitus  Family history of hypertension  Family history of stomach cancer  No pertinent family history in first degree relatives      ROS: As per HPI, 14-point ROS negative otherwise.        PHYSICAL EXAM:     Vital Signs:  Vital Signs Last 24 Hrs  T(C): 35.6 (2020 07:00), Max: 37 (15 Jul 2020 15:00)  T(F): 96.1 (2020 07:00), Max: 98.6 (15 Jul 2020 15:00)  HR: 81 (2020 11:48) (75 - 92)  BP: 139/67 (2020 11:48) (95/68 - 156/60)  BP(mean): 93 (2020 11:48) (74 - 99)  RR: 10 (2020 11:48) (10 - 27)  SpO2: 100% (2020 11:48) (91% - 100%)  Daily     Daily Weight in k.2 (2020 00:00)    GENERAL: no acute distress  NEURO: alert, AAOx3, mild tremors but improving, answers questions and follows commands  HEENT: anicteric sclera, no conjunctival pallor appreciated  CHEST: no respiratory distress, no accessory muscle use  CARDIAC: regular rate, rhythm  ABDOMEN: soft, incisions intact, non tender, ND, T tube tied, dressing over prior ANDREINA drain is clean and dry  EXTREMITIES: warm, well perfused, trace BLLE edema to thighs  SKIN: R heel with small wound without purulence      LABS:                        9.6    5.04  )-----------( 141      ( 2020 07:12 )             29.7     07-16    134<L>  |  97  |  31<H>  ----------------------------<  159<H>  4.7   |  27  |  1.40<H>    Ca    9.2      2020 07:12  Phos  3.0     07-16  Mg     1.8     07-16    TPro  5.4<L>  /  Alb  2.8<L>  /  TBili  1.2  /  DBili  0.5<H>  /  AST  16  /  ALT  50<H>  /  AlkPhos  154<H>  07-16    LIVER FUNCTIONS - ( 2020 07:12 )  Alb: 2.8 g/dL / Pro: 5.4 g/dL / ALK PHOS: 154 U/L / ALT: 50 U/L / AST: 16 U/L / GGT: x           PT/INR - ( 2020 07:12 )   PT: 11.7 sec;   INR: 0.98 ratio         PTT - ( 2020 07:12 )  PTT:29.6 sec    Amylase Serum--      Lipase serum--       [id number]      Imaging:

## 2020-07-16 NOTE — PROGRESS NOTE ADULT - PROBLEM SELECTOR PLAN 1
-test BG AC/HS  -c/w Lantus 10 units QHS  -Increase Humalog 6 units AC meals  -c/w Humalog moderate correction scale AC and low HS scale  DISPO: basal/bolus, with doses TBD  -Pt can resume f/u with Dr. Mathur or   [facility] [address] [place] [address] [address] [place] [phone].

## 2020-07-16 NOTE — PROGRESS NOTE ADULT - ATTENDING COMMENTS
64M PMH JEAN cirrhosis s/p OLT on 7/2/20  Post-Op Course Complicated by Encephalopathy and Tremors  Blood Cultures (7/10) with VRE  CT C/A/P (7/11) without obvious intraabdominal source.  Hypoxia prior to positive BCx which may be related to sepsis   Had L groin fluid collection which appears to be consistent with seroma - sample obtained for culture  Would continue Linezolid  TTE limited but without obvious vegetations    Of concern today is that there was some erythema and induration of the right aspect of the OLT surgical site. No expressed fluid and very minimal tenderness to palpation. Previously CT A/P (7/11) without any underlying fluid collection. Unclear if this represents infection - I would monitor for now - he developed this on Linezolid/Meropenem which would have covered very broadly for infectious etiologies.     Meropenem Course: 7/2 --> 7/7, 7/9 -->  I would strongly advocate to stop Meropenem. If concern is regarding prior ESBL E coli prostate abscess: patient has completed adequate prior course and was being suppressed successfully with Bactrim prior to transplant. All of his urine cultures (that resulted as positive) after initiation of Bactrim UTI Suppression were with VRE. Reassuringly his CT A/P with IV contrast (7/11) does not suggest any prostate or intraabdominal pathology.  If wanting to cover for urinary source instead can simply place him back on a higher dose of bactrim (i.e. DS PO QD) to continue UTI prevention.  He does have atelectasis on the CT Chest but in any case completed > 7 days of meropenem which would more than adequately cover for PNA.    I would also increase the Valcyte dose to 900 mg PO Q24H equivalent for renal function as he is CMV D+/R- and thus high risk.      I will continue to follow. Please feel free to contact me with any further questions.    Eusebio Pérez M.D.  [facility] Division of Infectious Disease  8AM-5PM: Pager Number [phone]  After Hours (or if no response): Please contact the Infectious Diseases Office at [phone]     The above assessment and plan were discussed with Dr Gray, Dr Stef Guardado

## 2020-07-16 NOTE — PROGRESS NOTE ADULT - SUBJECTIVE AND OBJECTIVE BOX
Follow Up:  VRE bacteremia and post-OLT    Interval History/ROS:Patient is a 64y old  Male who presents with a chief complaint of Liver failure, hepatic encephalopathy (16 Jul 2020 10:07)  - No obvious mobile echodensities or vegetations are seen on the mitral valve. The pulmonic, aortic, and tricuspid valves is not well visualized.   - Still on Meropenem as transplant hesitant about pass prostate abscess hx  - POD 14 s/p OLT with good graft function;  LFTs trending down: ALk Phos 154 (185), AST 16 (24), ALT 50 (70), T bili 1.2   - Received Seroquel and Remeron last night; remained agitated/confused, started on Precedex gtt, off this am   - This morning, mental status better, AO x 3, following commands,   - off Precedex overnight  - pysch recs appreciated given seroquel, Remeron and melatonin     Allergies  codeine (Anaphylaxis)    ANTIMICROBIALS:    linezolid  IVPB 600 every 12 hours 7/10-  meropenem  IVPB 1000 every 8 hours 7/2-  nystatin    Suspension 599958 four times a day 6/11-  trimethoprim   80 mG/sulfamethoxazole 400 mG 1 daily 6/11-  valGANciclovir 450 daily 7/3-    s/p clarithromycin 6/30-7/1  s/p DAPTOmycin IVPB 7/3-4  s/p linezolid    Tablet 6/15-6/22  s/p metroNIDAZOLE  6/30-7/1  s/p rifAXIMin 6/11-7/1  s/p meropenem 6/12-6/15      OTHER MEDS: MEDICATIONS  (STANDING):  aspirin enteric coated 81 daily  furosemide    Tablet 40 once  insulin glargine Injectable (LANTUS) 10 at bedtime  insulin lispro (HumaLOG) corrective regimen sliding scale  three times a day before meals  insulin lispro (HumaLOG) corrective regimen sliding scale  at bedtime  insulin lispro Injectable (HumaLOG) 6 three times a day with meals  melatonin 3 at bedtime  mirtazapine 30 at bedtime  mycophenolate mofetil 500 <User Schedule>  pantoprazole    Tablet 40 before breakfast  predniSONE   Tablet 5 daily  tacrolimus 3 <User Schedule>  tamsulosin 0.8 at bedtime  ursodiol Capsule 300 two times a day      Vital Signs Last 24 Hrs  T(F): 96.1 (07-16-20 @ 07:00), Max: 98.8 (07-14-20 @ 15:00)    Vital Signs Last 24 Hrs  HR: 81 (07-16-20 @ 11:48) (75 - 92)  BP: 139/67 (07-16-20 @ 11:48) (95/68 - 156/60)  RR: 10 (07-16-20 @ 11:48)  SpO2: 100% (07-16-20 @ 11:48) (91% - 100%)  Wt(kg): --    EXAM:  Constitutional: Not in acute distress. Tremor+  Eyes: pupils bilaterally reactive to light. No icterus.  Oral cavity: Clear, no lesions  Neck: No neck vein distension noted  RS: Chest clear to auscultation bilaterally. No wheeze/rhonchi/crepitations.  CVS: S1, S2 heard. Regular rate and rhythm. No murmurs/rubs/gallops.  Abdomen: Liver transplant surgical scar seen, wound clean. Ascites drainage port seen, no fluid out. Left groin surgical scar seen, no collection seen.  : No acute abnormalities  Extremities: Warm. No pedal edema  Skin: No lesions noted  Vascular: No evidence of phlebitis  Neuro: A&O*3    Labs:                        9.6    5.04  )-----------( 141      ( 16 Jul 2020 07:12 )             29.7     07-16    134<L>  |  97  |  31<H>  ----------------------------<  159<H>  4.7   |  27  |  1.40<H>    Ca    9.2      16 Jul 2020 07:12  Phos  3.0     07-16  Mg     1.8     07-16    TPro  5.4<L>  /  Alb  2.8<L>  /  TBili  1.2  /  DBili  0.5<H>  /  AST  16  /  ALT  50<H>  /  AlkPhos  154<H>  07-16    WBC Count: 5.04 (07-16-20 @ 07:12)  WBC Count: 4.31 (07-15-20 @ 07:28)  WBC Count: 4.68 (07-14-20 @ 07:05)  WBC Count: 4.58 (07-13-20 @ 05:25)  WBC Count: 5.51 (07-12-20 @ 01:54)  WBC Count: 5.78 (07-11-20 @ 21:24)  WBC Count: 5.58 (07-10-20 @ 22:30)  WBC Count: 5.22 (07-10-20 @ 06:45)      Creatinine, Serum: 1.40 (07-16)  Creatinine, Serum: 1.30 (07-15)  Creatinine, Serum: 1.26 (07-14)  Creatinine, Serum: 1.19 (07-13)  Creatinine, Serum: 1.26 (07-12)  Creatinine, Serum: 1.24 (07-12)  Creatinine, Serum: 1.23 (07-11)  Creatinine, Serum: 1.06 (07-10)  Creatinine, Serum: 1.00 (07-10)      MICROBIOLOGY:    Culture - Body Fluid with Gram Stain (collected 14 Jul 2020 00:32)  Source: .Body Fluid Abdominal Fluid  Gram Stain (14 Jul 2020 04:12):    polymorphonuclear leukocytes seen    No organisms seen    by cytocentrifuge  Preliminary Report (14 Jul 2020 22:08):    No growth    Culture - Blood (collected 12 Jul 2020 10:04)  Source: .Blood Blood-Peripheral  Preliminary Report (13 Jul 2020 11:01):    No growth to date.    Culture - Blood (collected 12 Jul 2020 10:04)  Source: .Blood Blood-Peripheral  Preliminary Report (13 Jul 2020 11:01):    No growth to date.    Culture - Blood (collected 10 Jul 2020 00:22)  Source: .Blood Blood-Peripheral  Enterococcus faecium (vancomycin resistant) (12 Jul 2020 11:59)  Organism: Enterococcus faecium (vancomycin resistant) (12 Jul 2020 11:59)      -  Ampicillin: R >8 Predicts results to ampicillin/sulbactam, amoxacillin-clavulanate and  piperacillin-tazobactam.      -  Daptomycin: S 4      -  Gentamicin synergy: S      -  Linezolid: S 1      -  Streptomycin synergy: R      -  Vancomycin: R >16      RADIOLOGY:  imaging below personally reviewed    < from: Limited Transthoracic Echo (07.15.20 @ 16:58) >  Conclusions:  Limited study to evaluate for endocarditis. Technically  difficult study with limited/suboptimal views.  1. Mitral annular calcification, otherwise normal mitral  valve. Minimal mitral regurgitation.  2. Aortic valve not well visualized; appears calcified.  3. Endocardium not well visualized; grossly normal left  ventricular systolic function.  4. The right ventricle is not well visualized.  5. Tricuspid valve not well visualized.  6. Pulmonic valve not well visualized.  7. No obvious mobile echodensities or vegetations are seen  on the mitral valve. The pulmonic, aortic, and tricuspid  valves is not well visualized. Consider DUSTIN if clinically  indicated.    < end of copied text >    OTHER TESTS:  Procalcitonin, Serum: 0.87 (07-16 @ 07:12)  Procalcitonin, Serum: 0.71 (07-15 @ 07:27)  Procalcitonin, Serum: 0.68 (07-14 @ 07:05)  Procalcitonin, Serum: 0.87 (07-13 @ 05:25)  Procalcitonin, Serum: 1.10 (07-09 @ 18:15)  Procalcitonin, Serum: 0.13 (06-24 @ 03:24)  Procalcitonin, Serum: 0.12 (06-21 @ 20:24) Follow Up:  VRE bacteremia and post-OLT    Interval History/ROS:Patient is a 64y old  Male who presents with a chief complaint of Liver failure, hepatic encephalopathy (16 Jul 2020 10:07)  - No obvious mobile echodensities or vegetations are seen on the mitral valve. The pulmonic, aortic, and tricuspid valves is not well visualized.   - noticed CMV status: D+/R- (high risk), valganciclovir dose can increase to 900mg daily  - Still on Meropenem as transplant hesitant about pass prostate abscess hx  - POD 14 s/p OLT with good graft function;  LFTs trending down: ALk Phos 154 (185), AST 16 (24), ALT 50 (70), T bili 1.2   - Received Seroquel and Remeron last night; remained agitated/confused, started on Precedex gtt, off this am   - This morning, mental status better, AO x 3, following commands,   - off Precedex overnight  - pysch recs appreciated given seroquel, Remeron and melatonin     Allergies  codeine (Anaphylaxis)    ANTIMICROBIALS:    linezolid  IVPB 600 every 12 hours 7/10-  meropenem  IVPB 1000 every 8 hours 7/2-  nystatin    Suspension [id number] four times a day 6/11-  trimethoprim   80 mG/sulfamethoxazole 400 mG 1 daily 6/11-  valGANciclovir 450 daily 7/3-    s/p clarithromycin 6/30-7/1  s/p DAPTOmycin IVPB 7/3-4  s/p linezolid    Tablet 6/15-6/22  s/p metroNIDAZOLE  6/30-7/1  s/p rifAXIMin 6/11-7/1  s/p meropenem 6/12-6/15      OTHER MEDS: MEDICATIONS  (STANDING):  aspirin enteric coated 81 daily  furosemide    Tablet 40 once  insulin glargine Injectable (LANTUS) 10 at bedtime  insulin lispro (HumaLOG) corrective regimen sliding scale  three times a day before meals  insulin lispro (HumaLOG) corrective regimen sliding scale  at bedtime  insulin lispro Injectable (HumaLOG) 6 three times a day with meals  melatonin 3 at bedtime  mirtazapine 30 at bedtime  mycophenolate mofetil 500 <User Schedule>  pantoprazole    Tablet 40 before breakfast  predniSONE   Tablet 5 daily  tacrolimus 3 <User Schedule>  tamsulosin 0.8 at bedtime  ursodiol Capsule 300 two times a day      Vital Signs Last 24 Hrs  T(F): 96.1 (07-16-20 @ 07:00), Max: 98.8 (07-14-20 @ 15:00)    Vital Signs Last 24 Hrs  HR: 81 (07-16-20 @ 11:48) (75 - 92)  BP: 139/67 (07-16-20 @ 11:48) (95/68 - 156/60)  RR: 10 (07-16-20 @ 11:48)  SpO2: 100% (07-16-20 @ 11:48) (91% - 100%)  Wt(kg): --    EXAM:  Constitutional: Not in acute distress. Tremor+  Eyes: pupils bilaterally reactive to light. No icterus.  Oral cavity: Clear, no lesions  Neck: No neck vein distension noted  RS: Chest clear to auscultation bilaterally. No wheeze/rhonchi/crepitations.  CVS: S1, S2 heard. Regular rate and rhythm. No murmurs/rubs/gallops.  Abdomen: Liver transplant surgical scar seen, wound clean. Ascites drainage port seen, no fluid out. Left groin surgical scar seen, no collection seen.  : No acute abnormalities  Extremities: Warm. No pedal edema  Skin: No lesions noted  Vascular: No evidence of phlebitis  Neuro: A&O*3    Labs:                        9.6    5.04  )-----------( 141      ( 16 Jul 2020 07:12 )             29.7     07-16    134<L>  |  97  |  31<H>  ----------------------------<  159<H>  4.7   |  27  |  1.40<H>    Ca    9.2      16 Jul 2020 07:12  Phos  3.0     07-16  Mg     1.8     07-16    TPro  5.4<L>  /  Alb  2.8<L>  /  TBili  1.2  /  DBili  0.5<H>  /  AST  16  /  ALT  50<H>  /  AlkPhos  154<H>  07-16    WBC Count: 5.04 (07-16-20 @ 07:12)  WBC Count: 4.31 (07-15-20 @ 07:28)  WBC Count: 4.68 (07-14-20 @ 07:05)  WBC Count: 4.58 (07-13-20 @ 05:25)  WBC Count: 5.51 (07-12-20 @ 01:54)  WBC Count: 5.78 (07-11-20 @ 21:24)  WBC Count: 5.58 (07-10-20 @ 22:30)  WBC Count: 5.22 (07-10-20 @ 06:45)      Creatinine, Serum: 1.40 (07-16)  Creatinine, Serum: 1.30 (07-15)  Creatinine, Serum: 1.26 (07-14)  Creatinine, Serum: 1.19 (07-13)  Creatinine, Serum: 1.26 (07-12)  Creatinine, Serum: 1.24 (07-12)  Creatinine, Serum: 1.23 (07-11)  Creatinine, Serum: 1.06 (07-10)  Creatinine, Serum: 1.00 (07-10)      MICROBIOLOGY:    Culture - Body Fluid with Gram Stain (collected 14 Jul 2020 00:32)  Source: .Body Fluid Abdominal Fluid  Gram Stain (14 Jul 2020 04:12):    polymorphonuclear leukocytes seen    No organisms seen    by cytocentrifuge  Preliminary Report (14 Jul 2020 22:08):    No growth    Culture - Blood (collected 12 Jul 2020 10:04)  Source: .Blood Blood-Peripheral  Preliminary Report (13 Jul 2020 11:01):    No growth to date.    Culture - Blood (collected 12 Jul 2020 10:04)  Source: .Blood Blood-Peripheral  Preliminary Report (13 Jul 2020 11:01):    No growth to date.    Culture - Blood (collected 10 Jul 2020 00:22)  Source: .Blood Blood-Peripheral  Enterococcus faecium (vancomycin resistant) (12 Jul 2020 11:59)  Organism: Enterococcus faecium (vancomycin resistant) (12 Jul 2020 11:59)      -  Ampicillin: R >8 Predicts results to ampicillin/sulbactam, amoxacillin-clavulanate and  piperacillin-tazobactam.      -  Daptomycin: S 4      -  Gentamicin synergy: S      -  Linezolid: S 1      -  Streptomycin synergy: R      -  Vancomycin: R >16      RADIOLOGY:  imaging below personally reviewed    < from: Limited Transthoracic Echo (07.15.20 @ 16:58) >  Conclusions:  Limited study to evaluate for endocarditis. Technically  difficult study with limited/suboptimal views.  1. Mitral annular calcification, otherwise normal mitral  valve. Minimal mitral regurgitation.  2. Aortic valve not well visualized; appears calcified.  3. Endocardium not well visualized; grossly normal left  ventricular systolic function.  4. The right ventricle is not well visualized.  5. Tricuspid valve not well visualized.  6. Pulmonic valve not well visualized.  7. No obvious mobile echodensities or vegetations are seen  on the mitral valve. The pulmonic, aortic, and tricuspid  valves is not well visualized. Consider DUSTIN if clinically  indicated.    < end of copied text >    OTHER TESTS:  Procalcitonin, Serum: 0.87 (07-16 @ 07:12)  Procalcitonin, Serum: 0.71 (07-15 @ 07:27)  Procalcitonin, Serum: 0.68 (07-14 @ 07:05)  Procalcitonin, Serum: 0.87 (07-13 @ 05:25)  Procalcitonin, Serum: 1.10 (07-09 @ 18:15)  Procalcitonin, Serum: 0.13 (06-24 @ 03:24)  Procalcitonin, Serum: 0.12 (06-21 @ 20:24) Follow Up:  VRE bacteremia and post-OLT    Interval History/ROS:Patient is a 64y old  Male who presents with a chief complaint of Liver failure, hepatic encephalopathy (16 Jul 2020 10:07)  - No obvious mobile echodensities or vegetations are seen on the mitral valve. The pulmonic, aortic, and tricuspid valves is not well visualized.   - noticed CMV status: D+/R- (high risk), valganciclovir dose can increase to 900mg daily  - Still on Meropenem as transplant hesitant about pass prostate abscess hx  - POD 14 s/p OLT with good graft function;  LFTs trending down: ALk Phos 154 (185), AST 16 (24), ALT 50 (70), T bili 1.2   - Received Seroquel and Remeron last night; remained agitated/confused, started on Precedex gtt, off this am   - This morning, mental status better, AO x 3, following commands,   - off Precedex overnight  - pysch recs appreciated given seroquel, Remeron and melatonin     Allergies  codeine (Anaphylaxis)    ANTIMICROBIALS:    linezolid  IVPB 600 every 12 hours 7/10-  meropenem  IVPB 1000 every 8 hours 7/2-  nystatin    Suspension [id number] four times a day 6/11-  trimethoprim   80 mG/sulfamethoxazole 400 mG 1 daily 6/11-  valGANciclovir 450 daily 7/3-    s/p clarithromycin 6/30-7/1  s/p DAPTOmycin IVPB 7/3-4  s/p linezolid    Tablet 6/15-6/22  s/p metroNIDAZOLE  6/30-7/1  s/p rifAXIMin 6/11-7/1  s/p meropenem 6/12-6/15      OTHER MEDS: MEDICATIONS  (STANDING):  aspirin enteric coated 81 daily  furosemide    Tablet 40 once  insulin glargine Injectable (LANTUS) 10 at bedtime  insulin lispro (HumaLOG) corrective regimen sliding scale  three times a day before meals  insulin lispro (HumaLOG) corrective regimen sliding scale  at bedtime  insulin lispro Injectable (HumaLOG) 6 three times a day with meals  melatonin 3 at bedtime  mirtazapine 30 at bedtime  mycophenolate mofetil 500 <User Schedule>  pantoprazole    Tablet 40 before breakfast  predniSONE   Tablet 5 daily  tacrolimus 3 <User Schedule>  tamsulosin 0.8 at bedtime  ursodiol Capsule 300 two times a day      Vital Signs Last 24 Hrs  T(F): 96.1 (07-16-20 @ 07:00), Max: 98.8 (07-14-20 @ 15:00)    Vital Signs Last 24 Hrs  HR: 81 (07-16-20 @ 11:48) (75 - 92)  BP: 139/67 (07-16-20 @ 11:48) (95/68 - 156/60)  RR: 10 (07-16-20 @ 11:48)  SpO2: 100% (07-16-20 @ 11:48) (91% - 100%)  Wt(kg): --    EXAM:  Constitutional: Not in acute distress. Tremor+  Eyes: pupils bilaterally reactive to light. No icterus.  Oral cavity: Clear, no lesions  Neck: No neck vein distension noted  RS: Chest clear to auscultation bilaterally. No wheeze/rhonchi/crepitations.  CVS: S1, S2 heard. Regular rate and rhythm. No murmurs/rubs/gallops.  Abdomen: Liver transplant surgical scar seen, wound clean. Ascites drainage port seen, no fluid out. Left groin surgical scar seen, no collection seen.  : No acute abnormalities  Extremities: Warm. No pedal edema  Skin: No lesions noted  Vascular: No evidence of phlebitis  Neuro: A&O*3    Labs:                        9.6    5.04  )-----------( 141      ( 16 Jul 2020 07:12 )             29.7     07-16    134<L>  |  97  |  31<H>  ----------------------------<  159<H>  4.7   |  27  |  1.40<H>    Ca    9.2      16 Jul 2020 07:12  Phos  3.0     07-16  Mg     1.8     07-16    TPro  5.4<L>  /  Alb  2.8<L>  /  TBili  1.2  /  DBili  0.5<H>  /  AST  16  /  ALT  50<H>  /  AlkPhos  154<H>  07-16    WBC Count: 5.04 (07-16-20 @ 07:12)  WBC Count: 4.31 (07-15-20 @ 07:28)  WBC Count: 4.68 (07-14-20 @ 07:05)  WBC Count: 4.58 (07-13-20 @ 05:25)  WBC Count: 5.51 (07-12-20 @ 01:54)  WBC Count: 5.78 (07-11-20 @ 21:24)  WBC Count: 5.58 (07-10-20 @ 22:30)  WBC Count: 5.22 (07-10-20 @ 06:45)      Creatinine, Serum: 1.40 (07-16)  Creatinine, Serum: 1.30 (07-15)  Creatinine, Serum: 1.26 (07-14)  Creatinine, Serum: 1.19 (07-13)  Creatinine, Serum: 1.26 (07-12)  Creatinine, Serum: 1.24 (07-12)  Creatinine, Serum: 1.23 (07-11)  Creatinine, Serum: 1.06 (07-10)  Creatinine, Serum: 1.00 (07-10)      MICROBIOLOGY:    Culture - Body Fluid with Gram Stain (collected 14 Jul 2020 00:32)  Source: .Body Fluid Abdominal Fluid  Gram Stain (14 Jul 2020 04:12):    polymorphonuclear leukocytes seen    No organisms seen    by cytocentrifuge  Preliminary Report (14 Jul 2020 22:08):    No growth    Culture - Blood (collected 12 Jul 2020 10:04)  Source: .Blood Blood-Peripheral  Preliminary Report (13 Jul 2020 11:01):    No growth to date.    Culture - Blood (collected 12 Jul 2020 10:04)  Source: .Blood Blood-Peripheral  Preliminary Report (13 Jul 2020 11:01):    No growth to date.    Culture - Blood (collected 10 Jul 2020 00:22)  Source: .Blood Blood-Peripheral  Enterococcus faecium (vancomycin resistant) (12 Jul 2020 11:59)  Organism: Enterococcus faecium (vancomycin resistant) (12 Jul 2020 11:59)      -  Ampicillin: R >8 Predicts results to ampicillin/sulbactam, amoxacillin-clavulanate and  piperacillin-tazobactam.      -  Daptomycin: S 4      -  Gentamicin synergy: S      -  Linezolid: S 1      -  Streptomycin synergy: R      -  Vancomycin: R >16      RADIOLOGY:    < from: Limited Transthoracic Echo (07.15.20 @ 16:58) >  Conclusions:  Limited study to evaluate for endocarditis. Technically  difficult study with limited/suboptimal views.  1. Mitral annular calcification, otherwise normal mitral  valve. Minimal mitral regurgitation.  2. Aortic valve not well visualized; appears calcified.  3. Endocardium not well visualized; grossly normal left  ventricular systolic function.  4. The right ventricle is not well visualized.  5. Tricuspid valve not well visualized.  6. Pulmonic valve not well visualized.  7. No obvious mobile echodensities or vegetations are seen  on the mitral valve. The pulmonic, aortic, and tricuspid  valves is not well visualized. Consider DUSTIN if clinically  indicated.    <The imaging below has been reviewed and visualized by me independently. Findings as detailed in report below>    EXAM:  CT CHEST IC                        EXAM:  CT ABDOMEN AND PELVIS IC                        PROCEDURE DATE:  07/11/2020    Small bilateral pleural effusions, right greater than left, with associated compressive atelectasis.  Status post liver transplant.   Small volume ascites.  Varices again noted.  Nonobstructing intrarenal calculi.  Partially imaged fluid collection in the left groin with overlying skin staples.    OTHER TESTS:  Procalcitonin, Serum: 0.87 (07-16 @ 07:12)  Procalcitonin, Serum: 0.71 (07-15 @ 07:27)  Procalcitonin, Serum: 0.68 (07-14 @ 07:05)  Procalcitonin, Serum: 0.87 (07-13 @ 05:25)  Procalcitonin, Serum: 1.10 (07-09 @ 18:15)  Procalcitonin, Serum: 0.13 (06-24 @ 03:24)  Procalcitonin, Serum: 0.12 (06-21 @ 20:24)

## 2020-07-16 NOTE — CHART NOTE - NSCHARTNOTEFT_GEN_A_CORE
Nutrition Follow Up Note     Patient seen for: nutrition follow up on 8ICU    Source: RN, patient, medical record, communication with team. Pt noted with ongoing confusion/agitation overnight.    Chart reviewed, events noted. s/p liver transplant 7/2: live enzymes continue to downtrend    Diet Order: Diet, Consistent Carbohydrate w/Evening Snack:   Low Fat (LOWFAT) (07-14-20 @ 10:07)    Nutrition Events:   - PO Intake: Pt is consuming % of meal trays with a hearty appetite. Rx for Remeron noted.  - Hyperglycemia with episodes of overcorrection; Endocrine is following. Currently managed with Lantus (10 units bedtime), Humalog (6 units premeal), and Humalog corrective regimen.  - Nutrition Education: Reviewed post transplant nutrition therapy and food safety guidelines for transplant recipients. Reviewed recommendations to avoid grapefruit, pomegranate and star fruit while taking immunosuppressant medication. Reviewed recommendations for moderate intake of sodium and carbohydrates with transplant medications. Pt was receptive and expressed understanding, however continues to request additional carbohydrates. RD will continue to reinforce diet education prior to discharge.     Last BM 7/16    Anthropometric Measurements:   Height (cm): 185 (07-01-20 @ 13:54), 185.42 (04-27-20 @ 14:53)  Weight (kg): 103 (07-01-20 @ 13:54), 117.9 (04-27-20 @ 14:53); 118.2 (07-10-20). 106.2 (07-16-20); weight change likely reflects fluid shifts  BMI (kg/m2): 30.1 (07-01-20 @ 13:54), 34.3 (04-27-20 @ 14:53)  IBW: 83.4 kg    Medications: MEDICATIONS  (STANDING):  aspirin enteric coated 81 milliGRAM(s) Oral daily  chlorhexidine 2% Cloths 1 Application(s) Topical <User Schedule>  collagenase Ointment 1 Application(s) Topical daily  insulin glargine Injectable (LANTUS) 10 Unit(s) SubCutaneous at bedtime  insulin lispro (HumaLOG) corrective regimen sliding scale   SubCutaneous three times a day before meals  insulin lispro (HumaLOG) corrective regimen sliding scale   SubCutaneous at bedtime  insulin lispro Injectable (HumaLOG) 6 Unit(s) SubCutaneous three times a day with meals  linezolid  IVPB 600 milliGRAM(s) IV Intermittent every 12 hours  magnesium oxide 800 milliGRAM(s) Oral two times a day with meals  melatonin 3 milliGRAM(s) Oral at bedtime  mirtazapine 30 milliGRAM(s) Oral at bedtime  mycophenolate mofetil 500 milliGRAM(s) Oral <User Schedule>  nystatin    Suspension [id number] Unit(s) Swish and Swallow four times a day  nystatin Cream 1 Application(s) Topical two times a day  pantoprazole    Tablet 40 milliGRAM(s) Oral before breakfast  predniSONE   Tablet 5 milliGRAM(s) Oral daily  tacrolimus 3 milliGRAM(s) Oral <User Schedule>  tamsulosin 0.8 milliGRAM(s) Oral at bedtime  trimethoprim   80 mG/sulfamethoxazole 400 mG 1 Tablet(s) Oral daily  ursodiol Capsule 300 milliGRAM(s) Oral two times a day  valGANciclovir 450 milliGRAM(s) Oral daily    MEDICATIONS  (PRN):    Labs: 07-16 @ 07:12: Sodium 134<L>, Potassium 4.7, Calcium 9.2, Magnesium 1.8, Phosphorus 3.0, BUN 31<H>, Creatinine 1.40<H>, Glucose 159<H>, Alk Phos 154<H>, ALT/SGPT 50<H>, AST/SGOT 16, Albumin 2.8<L>, Total Bilirubin 1.2, Hemoglobin 9.6<L>, Hematocrit 29.7<L>, Creatine Kinase <<27>    HbA1c 5.2%    POCT Blood Glucose.: 217 mg/dL (07-16-20 @ 11:50)  POCT Blood Glucose.: 172 mg/dL (07-16-20 @ 08:25)  POCT Blood Glucose.: 195 mg/dL (07-15-20 @ 21:42)  POCT Blood Glucose.: 249 mg/dL (07-15-20 @ 17:38)    Skin per nursing documentation: no pressure injuries documented  Edema: 1+ left/right ankle, foot    Estimated Needs: based on IBW 83.4 kg   Energy: 0336-2626 calories  (25-30 fahad/kg)  Protein: 100-117 gm (1.2-1.4 gm/kg)    Previous Nutrition Diagnosis: Increased Nutrient Needs  Nutrition Diagnosis is: ongoing, addressed with therapeutic diet     New Nutrition Diagnosis:  Food and Nutrition Related Knowledge Deficit, related to limited prior education on post-transplant nutrition therapy and food safety guidelines, as evidenced by s/p liver transplant 7/2/20    Recommended Interventions:   1) Continue Consistent Carbohydrate, Low Fat diet.   2) Continue to reinforce post-transplant nutrition therapy and food safety guidelines in-house and prior to discharge.   3) Discharge diet: Continue as above. Recommend follow up visit with Transplant MD and outpatient RD for dietary modifications as warranted.       Monitoring and Evaluation:   Continue to monitor nutrition provision and tolerance, weights, labs, skin integrity.   RD remains available upon request and will follow up per protocol.    Myah Argueta MS RD CDN [facility]; Pager # [phone]

## 2020-07-17 LAB
ALBUMIN SERPL ELPH-MCNC: 3.1 G/DL — LOW (ref 3.3–5)
ALP SERPL-CCNC: 137 U/L — HIGH (ref 40–120)
ALT FLD-CCNC: 40 U/L — SIGNIFICANT CHANGE UP (ref 10–45)
AMMONIA BLD-MCNC: 22 UMOL/L — SIGNIFICANT CHANGE UP (ref 11–55)
ANION GAP SERPL CALC-SCNC: 11 MMOL/L — SIGNIFICANT CHANGE UP (ref 5–17)
APTT BLD: 29.7 SEC — SIGNIFICANT CHANGE UP (ref 27.5–35.5)
AST SERPL-CCNC: 15 U/L — SIGNIFICANT CHANGE UP (ref 10–40)
BILIRUB DIRECT SERPL-MCNC: 0.4 MG/DL — HIGH (ref 0–0.2)
BILIRUB INDIRECT FLD-MCNC: 0.7 MG/DL — SIGNIFICANT CHANGE UP (ref 0.2–1)
BILIRUB SERPL-MCNC: 1.1 MG/DL — SIGNIFICANT CHANGE UP (ref 0.2–1.2)
BUN SERPL-MCNC: 30 MG/DL — HIGH (ref 7–23)
CALCIUM SERPL-MCNC: 9.2 MG/DL — SIGNIFICANT CHANGE UP (ref 8.4–10.5)
CHLORIDE SERPL-SCNC: 97 MMOL/L — SIGNIFICANT CHANGE UP (ref 96–108)
CO2 SERPL-SCNC: 23 MMOL/L — SIGNIFICANT CHANGE UP (ref 22–31)
CREAT SERPL-MCNC: 1.42 MG/DL — HIGH (ref 0.5–1.3)
CULTURE RESULTS: SIGNIFICANT CHANGE UP
CULTURE RESULTS: SIGNIFICANT CHANGE UP
GLUCOSE BLDC GLUCOMTR-MCNC: 145 MG/DL — HIGH (ref 70–99)
GLUCOSE BLDC GLUCOMTR-MCNC: 161 MG/DL — HIGH (ref 70–99)
GLUCOSE BLDC GLUCOMTR-MCNC: 173 MG/DL — HIGH (ref 70–99)
GLUCOSE BLDC GLUCOMTR-MCNC: 191 MG/DL — HIGH (ref 70–99)
GLUCOSE SERPL-MCNC: 142 MG/DL — HIGH (ref 70–99)
HCT VFR BLD CALC: 30.1 % — LOW (ref 39–50)
HGB BLD-MCNC: 10 G/DL — LOW (ref 13–17)
INR BLD: 0.98 RATIO — SIGNIFICANT CHANGE UP (ref 0.88–1.16)
MAGNESIUM SERPL-MCNC: 1.9 MG/DL — SIGNIFICANT CHANGE UP (ref 1.6–2.6)
MCHC RBC-ENTMCNC: 32.6 PG — SIGNIFICANT CHANGE UP (ref 27–34)
MCHC RBC-ENTMCNC: 33.2 GM/DL — SIGNIFICANT CHANGE UP (ref 32–36)
MCV RBC AUTO: 98 FL — SIGNIFICANT CHANGE UP (ref 80–100)
NRBC # BLD: 0 /100 WBCS — SIGNIFICANT CHANGE UP (ref 0–0)
PHOSPHATE SERPL-MCNC: 2.9 MG/DL — SIGNIFICANT CHANGE UP (ref 2.5–4.5)
PLATELET # BLD AUTO: 141 K/UL — LOW (ref 150–400)
POTASSIUM SERPL-MCNC: 4.9 MMOL/L — SIGNIFICANT CHANGE UP (ref 3.5–5.3)
POTASSIUM SERPL-SCNC: 4.9 MMOL/L — SIGNIFICANT CHANGE UP (ref 3.5–5.3)
PROCALCITONIN SERPL-MCNC: 0.67 NG/ML — HIGH (ref 0.02–0.1)
PROT SERPL-MCNC: 5.6 G/DL — LOW (ref 6–8.3)
PROTHROM AB SERPL-ACNC: 11.7 SEC — SIGNIFICANT CHANGE UP (ref 10.6–13.6)
RBC # BLD: 3.07 M/UL — LOW (ref 4.2–5.8)
RBC # FLD: 19 % — HIGH (ref 10.3–14.5)
SARS-COV-2 RNA SPEC QL NAA+PROBE: SIGNIFICANT CHANGE UP
SODIUM SERPL-SCNC: 131 MMOL/L — LOW (ref 135–145)
SPECIMEN SOURCE: SIGNIFICANT CHANGE UP
SPECIMEN SOURCE: SIGNIFICANT CHANGE UP
TACROLIMUS SERPL-MCNC: 8.4 NG/ML — SIGNIFICANT CHANGE UP
WBC # BLD: 4.75 K/UL — SIGNIFICANT CHANGE UP (ref 3.8–10.5)
WBC # FLD AUTO: 4.75 K/UL — SIGNIFICANT CHANGE UP (ref 3.8–10.5)

## 2020-07-17 PROCEDURE — 99232 SBSQ HOSP IP/OBS MODERATE 35: CPT | Mod: GC

## 2020-07-17 PROCEDURE — 71045 X-RAY EXAM CHEST 1 VIEW: CPT | Mod: 26

## 2020-07-17 PROCEDURE — 99232 SBSQ HOSP IP/OBS MODERATE 35: CPT

## 2020-07-17 PROCEDURE — 99232 SBSQ HOSP IP/OBS MODERATE 35: CPT | Mod: GC,24

## 2020-07-17 RX ORDER — QUETIAPINE FUMARATE 200 MG/1
25 TABLET, FILM COATED ORAL EVERY 4 HOURS
Refills: 0 | Status: DISCONTINUED | OUTPATIENT
Start: 2020-07-17 | End: 2020-07-17

## 2020-07-17 RX ORDER — QUETIAPINE FUMARATE 200 MG/1
25 TABLET, FILM COATED ORAL DAILY
Refills: 0 | Status: DISCONTINUED | OUTPATIENT
Start: 2020-07-17 | End: 2020-07-17

## 2020-07-17 RX ORDER — QUETIAPINE FUMARATE 200 MG/1
25 TABLET, FILM COATED ORAL
Refills: 0 | Status: DISCONTINUED | OUTPATIENT
Start: 2020-07-17 | End: 2020-07-23

## 2020-07-17 RX ORDER — MIRTAZAPINE 45 MG/1
15 TABLET, ORALLY DISINTEGRATING ORAL AT BEDTIME
Refills: 0 | Status: DISCONTINUED | OUTPATIENT
Start: 2020-07-17 | End: 2020-07-20

## 2020-07-17 RX ORDER — LANOLIN ALCOHOL/MO/W.PET/CERES
2 CREAM (GRAM) TOPICAL ONCE
Refills: 0 | Status: COMPLETED | OUTPATIENT
Start: 2020-07-17 | End: 2020-07-17

## 2020-07-17 RX ORDER — INSULIN LISPRO 100/ML
7 VIAL (ML) SUBCUTANEOUS
Refills: 0 | Status: DISCONTINUED | OUTPATIENT
Start: 2020-07-17 | End: 2020-07-19

## 2020-07-17 RX ORDER — LANOLIN ALCOHOL/MO/W.PET/CERES
5 CREAM (GRAM) TOPICAL AT BEDTIME
Refills: 0 | Status: DISCONTINUED | OUTPATIENT
Start: 2020-07-17 | End: 2020-07-20

## 2020-07-17 RX ADMIN — TACROLIMUS 3 MILLIGRAM(S): 5 CAPSULE ORAL at 20:06

## 2020-07-17 RX ADMIN — Medication 1 TABLET(S): at 11:59

## 2020-07-17 RX ADMIN — INSULIN GLARGINE 10 UNIT(S): 100 INJECTION, SOLUTION SUBCUTANEOUS at 21:26

## 2020-07-17 RX ADMIN — Medication 6 UNIT(S): at 08:19

## 2020-07-17 RX ADMIN — Medication 2 MILLIGRAM(S): at 23:55

## 2020-07-17 RX ADMIN — Medication 100000 UNIT(S): at 05:57

## 2020-07-17 RX ADMIN — Medication 100000 UNIT(S): at 21:27

## 2020-07-17 RX ADMIN — CHLORHEXIDINE GLUCONATE 1 APPLICATION(S): 213 SOLUTION TOPICAL at 05:57

## 2020-07-17 RX ADMIN — Medication 81 MILLIGRAM(S): at 11:59

## 2020-07-17 RX ADMIN — Medication 2: at 08:19

## 2020-07-17 RX ADMIN — Medication 6 UNIT(S): at 11:59

## 2020-07-17 RX ADMIN — TAMSULOSIN HYDROCHLORIDE 0.8 MILLIGRAM(S): 0.4 CAPSULE ORAL at 21:04

## 2020-07-17 RX ADMIN — Medication 2: at 11:59

## 2020-07-17 RX ADMIN — MYCOPHENOLATE MOFETIL 500 MILLIGRAM(S): 250 CAPSULE ORAL at 20:07

## 2020-07-17 RX ADMIN — MEROPENEM 100 MILLIGRAM(S): 1 INJECTION INTRAVENOUS at 05:57

## 2020-07-17 RX ADMIN — Medication 1 APPLICATION(S): at 14:18

## 2020-07-17 RX ADMIN — NYSTATIN CREAM 1 APPLICATION(S): 100000 CREAM TOPICAL at 05:57

## 2020-07-17 RX ADMIN — VALGANCICLOVIR 900 MILLIGRAM(S): 450 TABLET, FILM COATED ORAL at 11:59

## 2020-07-17 RX ADMIN — PANTOPRAZOLE SODIUM 40 MILLIGRAM(S): 20 TABLET, DELAYED RELEASE ORAL at 06:06

## 2020-07-17 RX ADMIN — MYCOPHENOLATE MOFETIL 500 MILLIGRAM(S): 250 CAPSULE ORAL at 08:21

## 2020-07-17 RX ADMIN — URSODIOL 300 MILLIGRAM(S): 250 TABLET, FILM COATED ORAL at 17:04

## 2020-07-17 RX ADMIN — NYSTATIN CREAM 1 APPLICATION(S): 100000 CREAM TOPICAL at 20:07

## 2020-07-17 RX ADMIN — MIRTAZAPINE 30 MILLIGRAM(S): 45 TABLET, ORALLY DISINTEGRATING ORAL at 21:04

## 2020-07-17 RX ADMIN — URSODIOL 300 MILLIGRAM(S): 250 TABLET, FILM COATED ORAL at 05:57

## 2020-07-17 RX ADMIN — Medication 3 MILLIGRAM(S): at 21:04

## 2020-07-17 RX ADMIN — MEROPENEM 100 MILLIGRAM(S): 1 INJECTION INTRAVENOUS at 14:19

## 2020-07-17 RX ADMIN — Medication 100000 UNIT(S): at 17:04

## 2020-07-17 RX ADMIN — QUETIAPINE FUMARATE 25 MILLIGRAM(S): 200 TABLET, FILM COATED ORAL at 21:04

## 2020-07-17 RX ADMIN — Medication 7 UNIT(S): at 17:05

## 2020-07-17 RX ADMIN — TACROLIMUS 3 MILLIGRAM(S): 5 CAPSULE ORAL at 08:21

## 2020-07-17 RX ADMIN — Medication 100000 UNIT(S): at 11:59

## 2020-07-17 NOTE — PROGRESS NOTE ADULT - ASSESSMENT
PLAN:   Neurologic: tremors, hallucinations, confusion   - Neuro status waxing and waning, worsening delirium   - 30 Remeron at night for sleep   - Seroquel 25 at bedtime   - Psychiatry consultation     Respiratory: possible RLL PNA, resolving  - IS, OOBTC as tolerated  - Continuous pulse oximetry    Cardiovascular: Intermittent hypertension  - Labetalol PRN for SBP goal <145  - ASA  - Monitor hemodynamic status    Gastrointestinal/Nutrition: GIB, JEAN cirrhosis, GAVE syndrome. S/p liver transplant   - Regularm lowfat, consistent carb diet. Tolerating well.  - Protonix 40mg qd  - C/w ursodiol  - Trend LFTs q12    Renal: urinary retention, hyponatremia (improving)  - C/w flomax for urinary retention, increased to 0.8  - Trend BMPs, replete lytes prn  - Beasley in place  - Lasix prn     Heme: coagulopathy  - Venodynes for mechanical VTE prophylaxis, holding chemical VTE prophylaxis in the setting of coagulopathy/ GIB  - Trend H/H, coags    Infectious Disease: immunosuppression possible RLL PNA  - VRE on 7/10 bcx, BCx 7/12 NGTD, UCx 7/11 NGTD  - Continue meropenem, linezolid  - Continue PPx as per transplant Bactrim, valcyte  - Continue immunosuppression as per transplant prednisone, tacro. Cellcept held  - CT C/A/P--> trace pleural effusion, groin hematoma s/p aspiration  - F/u groin aspirate Cx-->negative    Endo: DM type II  - ISS, lantus 10u qhs, Premeal 6 units Humalog   - Monitor FS premeal and qhs    Disposition:  - SICU full code

## 2020-07-17 NOTE — PROGRESS NOTE ADULT - SUBJECTIVE AND OBJECTIVE BOX
Chief Complaint:  Patient is a 64y old  Male who presents with a chief complaint of Liver failure, hepatic encephalopathy (2020 00:40)      Interval Events: Able to sleep briefly overnight. No new events otherwise. voiding without issue. Denies abdominal pain or fevers    Allergies:  codeine (Anaphylaxis)      Hospital Medications:  aspirin enteric coated 81 milliGRAM(s) Oral daily  chlorhexidine 2% Cloths 1 Application(s) Topical <User Schedule>  collagenase Ointment 1 Application(s) Topical daily  insulin glargine Injectable (LANTUS) 10 Unit(s) SubCutaneous at bedtime  insulin lispro (HumaLOG) corrective regimen sliding scale   SubCutaneous three times a day before meals  insulin lispro (HumaLOG) corrective regimen sliding scale   SubCutaneous at bedtime  insulin lispro Injectable (HumaLOG) 6 Unit(s) SubCutaneous three times a day with meals  linezolid  IVPB 600 milliGRAM(s) IV Intermittent every 12 hours  magnesium oxide 800 milliGRAM(s) Oral two times a day with meals  melatonin 3 milliGRAM(s) Oral at bedtime  meropenem  IVPB 1000 milliGRAM(s) IV Intermittent every 8 hours  mirtazapine 30 milliGRAM(s) Oral at bedtime  mycophenolate mofetil 500 milliGRAM(s) Oral <User Schedule>  nystatin    Suspension 731107 Unit(s) Swish and Swallow four times a day  nystatin Cream 1 Application(s) Topical two times a day  pantoprazole    Tablet 40 milliGRAM(s) Oral before breakfast  predniSONE   Tablet 5 milliGRAM(s) Oral daily  QUEtiapine 25 milliGRAM(s) Oral every 4 hours PRN  tacrolimus 3 milliGRAM(s) Oral <User Schedule>  tamsulosin 0.8 milliGRAM(s) Oral at bedtime  trimethoprim   80 mG/sulfamethoxazole 400 mG 1 Tablet(s) Oral daily  ursodiol Capsule 300 milliGRAM(s) Oral two times a day  valGANciclovir 900 milliGRAM(s) Oral daily      PMHX/PSHX:  GIB (gastrointestinal bleeding)  GERD with esophagitis  Hepatic encephalopathy  Obesity  Fatty liver disease, nonalcoholic  Renal stones  Hypertension  Neuropathy  Hypercholesteremia  Diabetes  S/P cholecystectomy  No significant past surgical history      Family history:  Family history of type 2 diabetes mellitus  Family history of hypertension  Family history of stomach cancer  No pertinent family history in first degree relatives      ROS: As per HPI, 14-point ROS negative otherwise.      PHYSICAL EXAM:     Vital Signs:  Vital Signs Last 24 Hrs  T(C): 36.5 (2020 09:00), Max: 36.8 (2020 03:00)  T(F): 97.7 (2020 09:00), Max: 98.2 (2020 03:00)  HR: 85 (2020 09:00) (78 - 97)  BP: 110/55 (2020 09:00) (93/57 - 155/62)  BP(mean): 75 (2020 09:00) (67 - 98)  RR: 22 (2020 09:00) (10 - 36)  SpO2: 100% (:00) (97% - 100%)  Daily     Daily Weight in k.3 (2020 02:17)    GENERAL: no acute distress  NEURO: alert, AAOx3, minimal tremors-improving, answers questions and follows commands  HEENT: anicteric sclera, no conjunctival pallor appreciated  CHEST: no respiratory distress, no accessory muscle use  CARDIAC: regular rate, rhythm  ABDOMEN: soft, incisions intact, non tender, ND, T tube tied, dressing over prior ANDREINA drain is clean and dry  EXTREMITIES: warm, well perfused, trace BLLE edema  SKIN: R heel with small wound without purulence wrapped in dressing    LABS:                        10.0   4.75  )-----------( 141      ( 2020 06:59 )             30.1     07-17    131<L>  |  97  |  30<H>  ----------------------------<  142<H>  4.9   |  23  |  1.42<H>    Ca    9.2      2020 06:59  Phos  2.9     07-17  Mg     1.9     07-17    TPro  5.6<L>  /  Alb  3.1<L>  /  TBili  1.1  /  DBili  0.4<H>  /  AST  15  /  ALT  40  /  AlkPhos  137<H>  07-17    LIVER FUNCTIONS - ( 2020 06:59 )  Alb: 3.1 g/dL / Pro: 5.6 g/dL / ALK PHOS: 137 U/L / ALT: 40 U/L / AST: 15 U/L / GGT: x           PT/INR - ( 2020 06:59 )   PT: 11.7 sec;   INR: 0.98 ratio         PTT - ( 2020 06:59 )  PTT:29.7 sec    Amylase Serum--      Lipase serum--       [id number]      Imaging:

## 2020-07-17 NOTE — PROGRESS NOTE ADULT - ASSESSMENT
#PENDING RECS. PLEASE WAIT FOR FINAL RECS AFTER DISCUSSION WITH ATTENDING#    ASSESSMENT:  63 y/o M PMHx decompensated JEAN Cirrhosis on transplant list, DMII, HFpEF, recent admission for ESBL E coli prostatic abscess 2/2 4 wks ertapenem, hx of ESBL UTIs with prostate abscess s/p IV abx, recent VRE urinary colonization came to hospital for worsening jaundice and episode of confusion, resolved PTA. s/p treatment course for possible VRE UTI. Now s/p OLT on 7/2/20. Course complicated by Encephalopathy and Tremors. Blood Cultures (7/10) with VRE sensitive to Linezolid. CT C/A/P without obvious intraabdominal source. Hypoxia prior to positive BCx which may be related to sepsis CT Chest (7/11) without evidence of pneumonia. Had L groin fluid collection which appears to be consistent with seroma - sample obtained for culture also negative. No drainage, erythema or underlying fluctuance was appreciated at the right heel. MRI prior to transplant without obvious infection. Transferred from [facility] to [facility] on 7/17/2020.    #VRE Bacteremia, L Groin Collection, Encephalopathy  - Abd US showed 9cm Left groin collection: likely represents a seroma or lymphocele; aspirated - cx: PMN seen, no organism seen  - Continue Linezolid 600 mg IV Q12H  - Continue monitoring tacrolimus levels  - TTE negative for endocarditis    #Hypoxic Respiratory Failure, Encephalopathy  - No evidence of pneumonia and collection in left groin appears consistent with seroma  - Can discontinue Meropenem    #s/p OLT   - Continue bactrim, valcyte ([facility] D+/R-) for prophylaxis   - Monitor patient for tremors - monitor tacrolimus dose     #Right heel wound  --Podiatry on board    --Local wound care     Suyapa Mayer MD, PGY4   ID fellow  Pager: [phone]  After 5pm/weekends call [phone]

## 2020-07-17 NOTE — PROGRESS NOTE ADULT - SUBJECTIVE AND OBJECTIVE BOX
INTERVAL HPI/OVERNIGHT EVENTS:    events noted  still tremulous but continues to improve         MEDICATIONS  (STANDING):  aspirin enteric coated 81 milliGRAM(s) Oral daily  chlorhexidine 2% Cloths 1 Application(s) Topical <User Schedule>  collagenase Ointment 1 Application(s) Topical daily  furosemide    Tablet 80 milliGRAM(s) Oral every 8 hours  insulin glargine Injectable (LANTUS) 16 Unit(s) SubCutaneous at bedtime  insulin lispro (HumaLOG) corrective regimen sliding scale   SubCutaneous three times a day before meals  insulin lispro (HumaLOG) corrective regimen sliding scale   SubCutaneous at bedtime  insulin lispro Injectable (HumaLOG) 10 Unit(s) SubCutaneous before dinner  insulin lispro Injectable (HumaLOG) 8 Unit(s) SubCutaneous before breakfast  insulin lispro Injectable (HumaLOG) 8 Unit(s) SubCutaneous before lunch  linezolid  IVPB 600 milliGRAM(s) IV Intermittent every 12 hours  magnesium oxide 800 milliGRAM(s) Oral two times a day with meals  meropenem  IVPB 1000 milliGRAM(s) IV Intermittent every 8 hours  nystatin    Suspension 536135 Unit(s) Swish and Swallow four times a day  nystatin Cream 1 Application(s) Topical two times a day  pantoprazole  Injectable 40 milliGRAM(s) IV Push every 12 hours  polyethylene glycol 3350 17 Gram(s) Oral daily  predniSONE   Tablet 5 milliGRAM(s) Oral daily  senna 2 Tablet(s) Oral at bedtime  tacrolimus 3 milliGRAM(s) Oral <User Schedule>  tacrolimus 2 milliGRAM(s) Oral <User Schedule>  tamsulosin 0.8 milliGRAM(s) Oral at bedtime  trimethoprim   80 mG/sulfamethoxazole 400 mG 1 Tablet(s) Oral daily  valGANciclovir 450 milliGRAM(s) Oral daily    MEDICATIONS  (PRN):  glucagon  Injectable 1 milliGRAM(s) IntraMuscular once PRN Glucose <70 milliGRAM(s)/deciLiter      Allergies    codeine (Anaphylaxis)    Intolerances        Review of Systems:    General:  No wt loss, fevers, chills, night sweats,fatigue,   Eyes:  Good vision, no reported pain  ENT:  No sore throat, pain, runny nose, dysphagia  CV:  No pain, palpitatioins, hypo/hypertension  Resp:  No dyspnea, cough, tachypnea, wheezing  GI:  No pain, No nausea, No vomiting, No diarrhea, No constipatiion, No weight loss, No fever, No pruritis, No rectal bleeding, No tarry stools, No dysphagia,  :  No pain, bleeding, incontinence, nocturia  Muscle:  No pain, weakness  Neuro:  No weakness, tingling, memory problems  Psych:  No fatigue, insomnia, mood problems, depression  Endocrine:  No polyuria, polydypsia, cold/heat intolerance  Heme:  No petechiae, ecchymosis, easy bruisability  Skin:  No rash, tattoos, scars, edema      Vital Signs Last 24 Hrs  T(C): 36.3 (12 Jul 2020 15:00), Max: 36.8 (11 Jul 2020 19:00)  T(F): 97.3 (12 Jul 2020 15:00), Max: 98.2 (11 Jul 2020 19:00)  HR: 80 (12 Jul 2020 15:00) (72 - 83)  BP: 109/59 (12 Jul 2020 15:00) (94/55 - 154/68)  BP(mean): 78 (12 Jul 2020 15:00) (70 - 125)  RR: 15 (12 Jul 2020 15:00) (9 - 28)  SpO2: 96% (12 Jul 2020 15:00) (96% - 100%)    PHYSICAL EXAM:    Constitutional: NAD  HEENT: sclera anicteric   Neck: No LAD, supple  Gastrointestinal: BS+, soft, ND, +incisional tenderness, incisions c/d/i  Extremities: +b/l peripheral edema  Neurological: awake, alert, +tremors - improving     LABS:                        9.3    5.51  )-----------( 98       ( 12 Jul 2020 01:54 )             28.6     07-12    131<L>  |  95<L>  |  31<H>  ----------------------------<  217<H>  5.0   |  27  |  1.26    Ca    8.1<L>      12 Jul 2020 12:22  Phos  4.0     07-12  Mg     2.0     07-12    TPro  4.4<L>  /  Alb  2.6<L>  /  TBili  1.1  /  DBili  0.5<H>  /  AST  64<H>  /  ALT  98<H>  /  AlkPhos  212<H>  07-12    PT/INR - ( 12 Jul 2020 01:54 )   PT: 12.7 sec;   INR: 1.12 ratio         PTT - ( 12 Jul 2020 01:54 )  PTT:28.7 sec      RADIOLOGY & ADDITIONAL TESTS:

## 2020-07-17 NOTE — PROGRESS NOTE ADULT - SUBJECTIVE AND OBJECTIVE BOX
Chief Complaint:     History:    MEDICATIONS  (STANDING):  aspirin enteric coated 81 milliGRAM(s) Oral daily  chlorhexidine 2% Cloths 1 Application(s) Topical <User Schedule>  collagenase Ointment 1 Application(s) Topical daily  insulin glargine Injectable (LANTUS) 10 Unit(s) SubCutaneous at bedtime  insulin lispro (HumaLOG) corrective regimen sliding scale   SubCutaneous three times a day before meals  insulin lispro (HumaLOG) corrective regimen sliding scale   SubCutaneous at bedtime  insulin lispro Injectable (HumaLOG) 6 Unit(s) SubCutaneous three times a day with meals  linezolid  IVPB 600 milliGRAM(s) IV Intermittent every 12 hours  magnesium oxide 800 milliGRAM(s) Oral two times a day with meals  melatonin 3 milliGRAM(s) Oral at bedtime  meropenem  IVPB 1000 milliGRAM(s) IV Intermittent every 8 hours  mirtazapine 30 milliGRAM(s) Oral at bedtime  mycophenolate mofetil 500 milliGRAM(s) Oral <User Schedule>  nystatin    Suspension 338452 Unit(s) Swish and Swallow four times a day  nystatin Cream 1 Application(s) Topical two times a day  pantoprazole    Tablet 40 milliGRAM(s) Oral before breakfast  predniSONE   Tablet 5 milliGRAM(s) Oral daily  tacrolimus 3 milliGRAM(s) Oral <User Schedule>  tamsulosin 0.8 milliGRAM(s) Oral at bedtime  trimethoprim   80 mG/sulfamethoxazole 400 mG 1 Tablet(s) Oral daily  ursodiol Capsule 300 milliGRAM(s) Oral two times a day  valGANciclovir 900 milliGRAM(s) Oral daily    MEDICATIONS  (PRN):  QUEtiapine 25 milliGRAM(s) Oral every 4 hours PRN agitation      Allergies    codeine (Anaphylaxis)    Intolerances      Review of Systems:  Constitutional: No fever  Eyes: No blurry vision  Neuro: No tremors  HEENT: No pain  Cardiovascular: No chest pain, palpitations  Respiratory: No SOB, no cough  GI: No nausea, vomiting, abdominal pain  : No dysuria  Skin: no rash  Psych: no depression  Endocrine: no polyuria, polydipsia  Hem/lymph: no swelling  Osteoporosis: no fractures    ALL OTHER SYSTEMS REVIEWED AND NEGATIVE    UNABLE TO OBTAIN    PHYSICAL EXAM:  VITALS: T(C): 36.5 (07-17-20 @ 09:00)  T(F): 97.7 (07-17-20 @ 09:00), Max: 98.2 (07-17-20 @ 03:00)  HR: 85 (07-17-20 @ 09:00) (78 - 97)  BP: 110/55 (07-17-20 @ 09:00) (93/57 - 155/62)  RR:  (10 - 36)  SpO2:  (97% - 100%)  Wt(kg): --  GENERAL: NAD, well-groomed, well-developed  EYES: No proptosis, no lid lag, anicteric  HEENT:  Atraumatic, Normocephalic, moist mucous membranes  THYROID: Normal size, no palpable nodules  RESPIRATORY: Clear to auscultation bilaterally; No rales, rhonchi, wheezing, or rubs  CARDIOVASCULAR: Regular rate and rhythm; No murmurs; no peripheral edema  GI: Soft, nontender, non distended, normal bowel sounds  SKIN: Dry, intact, No rashes or lesions  MUSCULOSKELETAL: Full range of motion, normal strength  NEURO: sensation intact, extraocular movements intact, no tremor, normal reflexes  PSYCH: Alert and oriented x 3, normal affect, normal mood  CUSHING'S SIGNS: no striae    POCT Blood Glucose.: 161 mg/dL (07-17-20 @ 08:16)  POCT Blood Glucose.: 165 mg/dL (07-16-20 @ 22:25)  POCT Blood Glucose.: 211 mg/dL (07-16-20 @ 17:07)  POCT Blood Glucose.: 217 mg/dL (07-16-20 @ 11:50)  POCT Blood Glucose.: 172 mg/dL (07-16-20 @ 08:25)  POCT Blood Glucose.: 195 mg/dL (07-15-20 @ 21:42)  POCT Blood Glucose.: 249 mg/dL (07-15-20 @ 17:38)  POCT Blood Glucose.: 239 mg/dL (07-15-20 @ 12:40)  POCT Blood Glucose.: 108 mg/dL (07-15-20 @ 08:36)  POCT Blood Glucose.: 279 mg/dL (07-14-20 @ 21:21)  POCT Blood Glucose.: 245 mg/dL (07-14-20 @ 15:36)  POCT Blood Glucose.: 197 mg/dL (07-14-20 @ 11:45)      07-17    131<L>  |  97  |  30<H>  ----------------------------<  142<H>  4.9   |  23  |  1.42<H>    EGFR if : 60  EGFR if non : 52<L>    Ca    9.2      07-17  Mg     1.9     07-17  Phos  2.9     07-17    TPro  5.6<L>  /  Alb  3.1<L>  /  TBili  1.1  /  DBili  0.4<H>  /  AST  15  /  ALT  40  /  AlkPhos  137<H>  07-17          Thyroid Function Tests: Chief Complaint: T2DM    History: Tolerating po diet. Meal time Hyperglycemia was noted yesterday and Humalog was adjusted.     MEDICATIONS  (STANDING):  aspirin enteric coated 81 milliGRAM(s) Oral daily  chlorhexidine 2% Cloths 1 Application(s) Topical <User Schedule>  collagenase Ointment 1 Application(s) Topical daily  insulin glargine Injectable (LANTUS) 10 Unit(s) SubCutaneous at bedtime  insulin lispro (HumaLOG) corrective regimen sliding scale   SubCutaneous three times a day before meals  insulin lispro (HumaLOG) corrective regimen sliding scale   SubCutaneous at bedtime  insulin lispro Injectable (HumaLOG) 6 Unit(s) SubCutaneous three times a day with meals  linezolid  IVPB 600 milliGRAM(s) IV Intermittent every 12 hours  magnesium oxide 800 milliGRAM(s) Oral two times a day with meals  melatonin 3 milliGRAM(s) Oral at bedtime  meropenem  IVPB 1000 milliGRAM(s) IV Intermittent every 8 hours  mirtazapine 30 milliGRAM(s) Oral at bedtime  mycophenolate mofetil 500 milliGRAM(s) Oral <User Schedule>  nystatin    Suspension 612031 Unit(s) Swish and Swallow four times a day  nystatin Cream 1 Application(s) Topical two times a day  pantoprazole    Tablet 40 milliGRAM(s) Oral before breakfast  predniSONE   Tablet 5 milliGRAM(s) Oral daily  tacrolimus 3 milliGRAM(s) Oral <User Schedule>  tamsulosin 0.8 milliGRAM(s) Oral at bedtime  trimethoprim   80 mG/sulfamethoxazole 400 mG 1 Tablet(s) Oral daily  ursodiol Capsule 300 milliGRAM(s) Oral two times a day  valGANciclovir 900 milliGRAM(s) Oral daily    MEDICATIONS  (PRN):  QUEtiapine 25 milliGRAM(s) Oral every 4 hours PRN agitation      Allergies    codeine (Anaphylaxis)    Intolerances      Review of Systems:  unable to obtain        PHYSICAL EXAM:  VITALS: T(C): 36.5 (07-17-20 @ 09:00)  T(F): 97.7 (07-17-20 @ 09:00), Max: 98.2 (07-17-20 @ 03:00)  HR: 85 (07-17-20 @ 09:00) (78 - 97)  BP: 110/55 (07-17-20 @ 09:00) (93/57 - 155/62)  RR:  (10 - 36)  SpO2:  (97% - 100%)  Wt(kg): --  GENERAL: NAD  EYES: No proptosis  HEENT:  Atraumatic, Normocephalic, moist mucous membranes  RESPIRATORY: Clear to auscultation bilaterally; No rales, rhonchi, wheezing, or rubs  CARDIOVASCULAR: Regular rate and rhythm  GI: Soft, nontender      POCT Blood Glucose.: 161 mg/dL (07-17-20 @ 08:16)  POCT Blood Glucose.: 165 mg/dL (07-16-20 @ 22:25)  POCT Blood Glucose.: 211 mg/dL (07-16-20 @ 17:07)  POCT Blood Glucose.: 217 mg/dL (07-16-20 @ 11:50)  POCT Blood Glucose.: 172 mg/dL (07-16-20 @ 08:25)  POCT Blood Glucose.: 195 mg/dL (07-15-20 @ 21:42)  POCT Blood Glucose.: 249 mg/dL (07-15-20 @ 17:38)  POCT Blood Glucose.: 239 mg/dL (07-15-20 @ 12:40)  POCT Blood Glucose.: 108 mg/dL (07-15-20 @ 08:36)  POCT Blood Glucose.: 279 mg/dL (07-14-20 @ 21:21)  POCT Blood Glucose.: 245 mg/dL (07-14-20 @ 15:36)  POCT Blood Glucose.: 197 mg/dL (07-14-20 @ 11:45)      07-17    131<L>  |  97  |  30<H>  ----------------------------<  142<H>  4.9   |  23  |  1.42<H>    EGFR if : 60  EGFR if non : 52<L>    Ca    9.2      07-17  Mg     1.9     07-17  Phos  2.9     07-17    TPro  5.6<L>  /  Alb  3.1<L>  /  TBili  1.1  /  DBili  0.4<H>  /  AST  15  /  ALT  40  /  AlkPhos  137<H>  07-17

## 2020-07-17 NOTE — PROGRESS NOTE ADULT - SUBJECTIVE AND OBJECTIVE BOX
Transplant Surgery - Multidisciplinary Rounds  --------------------------------------------------------------  OLTx      Date:  7/2/2020       POD#15    Present: Patient seen with multidisciplinary team including Transplant Surgeon: Dr. Gray, Transplant Nephrologist Dr. Salvador, Hepatology Dr. Mo, surgery resident OLY Quezada during am rounds and examined with Dr. Gray. Disciplines not in attendance will be notified of the plan.     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     Re-admitted on 6/11:   ·	worsening HE  ·	UTI/VRE: tx with linezolid 6/15-22, bactrim DS 6/22 - 7/2  ·	MISA/Hyponatremia  ·	UGI bleed (melena) s/p EGD (6/22) c/w hemorrhagic duodenitis/gastritis; Grade 2 EV; requiring multiple transfusions  ·	Known h/o R foot ulcer (MRI neg for OM)  ·	s/p OLTx on 7/2/2020, post op liver doppler with patent vessels  ·	post op course c/b delirium and VRE bacteremia (7/10)     Interval events:  - POD 15 s/p OLT with good graft function;  LFTs stable  - Mental status better; AO x3, off Precedex overnight   - Tolerating reg diet; having bowel function   - Pain well controlled   - OOB in chair      Potential Discharge date: pending clinical improvement     Education:  Medications    Plan of care:  See Below    MEDICATIONS  (STANDING):  aspirin enteric coated 81 milliGRAM(s) Oral daily  chlorhexidine 2% Cloths 1 Application(s) Topical <User Schedule>  collagenase Ointment 1 Application(s) Topical daily  insulin glargine Injectable (LANTUS) 10 Unit(s) SubCutaneous at bedtime  insulin lispro (HumaLOG) corrective regimen sliding scale   SubCutaneous three times a day before meals  insulin lispro (HumaLOG) corrective regimen sliding scale   SubCutaneous at bedtime  insulin lispro Injectable (HumaLOG) 6 Unit(s) SubCutaneous three times a day with meals  linezolid  IVPB 600 milliGRAM(s) IV Intermittent every 12 hours  magnesium oxide 800 milliGRAM(s) Oral two times a day with meals  melatonin 3 milliGRAM(s) Oral at bedtime  meropenem  IVPB 1000 milliGRAM(s) IV Intermittent every 8 hours  mirtazapine 30 milliGRAM(s) Oral at bedtime  mycophenolate mofetil 500 milliGRAM(s) Oral <User Schedule>  nystatin    Suspension 579142 Unit(s) Swish and Swallow four times a day  nystatin Cream 1 Application(s) Topical two times a day  pantoprazole    Tablet 40 milliGRAM(s) Oral before breakfast  predniSONE   Tablet 5 milliGRAM(s) Oral daily  tacrolimus 3 milliGRAM(s) Oral <User Schedule>  tamsulosin 0.8 milliGRAM(s) Oral at bedtime  trimethoprim   80 mG/sulfamethoxazole 400 mG 1 Tablet(s) Oral daily  ursodiol Capsule 300 milliGRAM(s) Oral two times a day  valGANciclovir 900 milliGRAM(s) Oral daily    MEDICATIONS  (PRN):  QUEtiapine 25 milliGRAM(s) Oral every 4 hours PRN agitation      PAST MEDICAL & SURGICAL HISTORY:  GIB (gastrointestinal bleeding)  GERD with esophagitis: Gastritis &amp; Non Bleeding Ulcers  Hepatic encephalopathy  Obesity  Fatty liver disease, nonalcoholic  Renal stones: 25 years ago  Hypertension  Neuropathy  Hypercholesteremia  Diabetes  S/P cholecystectomy      Vital Signs Last 24 Hrs  T(C): 36.5 (17 Jul 2020 09:00), Max: 36.8 (17 Jul 2020 03:00)  T(F): 97.7 (17 Jul 2020 09:00), Max: 98.2 (17 Jul 2020 03:00)  HR: 85 (17 Jul 2020 09:00) (78 - 97)  BP: 110/55 (17 Jul 2020 09:00) (93/57 - 155/62)  BP(mean): 75 (17 Jul 2020 09:00) (67 - 98)  RR: 22 (17 Jul 2020 09:00) (10 - 36)  SpO2: 100% (17 Jul 2020 09:00) (97% - 100%)    I&O's Summary    16 Jul 2020 07:01  -  17 Jul 2020 07:00  --------------------------------------------------------  IN: 1656 mL / OUT: 2265 mL / NET: -609 mL    17 Jul 2020 07:01  -  17 Jul 2020 10:08  --------------------------------------------------------  IN: 400 mL / OUT: 0 mL / NET: 400 mL                         10.0   4.75  )-----------( 141      ( 17 Jul 2020 06:59 )             30.1     07-17    131<L>  |  97  |  30<H>  ----------------------------<  142<H>  4.9   |  23  |  1.42<H>    Ca    9.2      17 Jul 2020 06:59  Phos  2.9     07-17  Mg     1.9     07-17    TPro  5.6<L>  /  Alb  3.1<L>  /  TBili  1.1  /  DBili  0.4<H>  /  AST  15  /  ALT  40  /  AlkPhos  137<H>  07-17    Tacrolimus (), Serum: 9.3 ng/mL (07-16 @ 08:58)    Review of systems  Gen: weakness  Skin: No rashes  Head/Eyes/Ears/Mouth: No headache; Normal hearing; Normal vision w/o blurriness; No sinus pain/discomfort, sore throat  Respiratory: No dyspnea, cough, wheezing, hemoptysis  CV: No chest pain, PND, orthopnea  GI: Mild abdominal pain at surgical incision site; denies diarrhea, constipation, nausea, vomiting, melena, hematochezia  : No increased frequency, dysuria, hematuria, nocturia  MSK: No joint pain/swelling; no back pain; no edema  Neuro: No dizziness/lightheadedness, weakness, seizures, numbness, tingling  Heme: No easy bruising or bleeding  Endo: No heat/cold intolerance  Psych: No significant nervousness, anxiety, stress, depression  All other systems were reviewed and are negative, except as noted.      PHYSICAL EXAM:  Constitutional: NAD, alert and oriented  Eyes: Anicteric, PERRLA  ENMT: nc/at  Neck: no central line, no JVD  Respiratory: CTA B/L, unlabored breathing   Cardiovascular: RRR  Gastrointestinal: soft, NT, ND, incision c/d/i, T-tube tied, ANDREINA site with serous drainage  Genitourinary: Voiding spontaneously  Extremities: heel wound without drainage or purulent material, edema improving   Vascular: Palpable dp pulses bilaterally  Neurological: A&O x3  Skin: no rashes  Musculoskeletal: Moving all extremities  Psychiatric: Responsive

## 2020-07-17 NOTE — CONSULT NOTE ADULT - ASSESSMENT
Impression:    Incontinence Dermatitis  Incontinence of stool    Recommend:  1.) topical therapy: bilateral buttocks/sacral wound - cleanse with Incontinence cleanser, pat dry, apply Stoma powder then seal in with Cavilon skin protectant in 3 layers BID and PRN for incontinent episodes  2.) Maintain on an alternating air with low air loss surface until an air fluidized surface is available  3.) turn and reposition Q 2 hours  4.) Incontinence management - incontinence cleanser, pads, pericare BID  5.) Nutrition Optimization  6.) Glycemic control  7.) Offload bilateral heels/feet with complete care air fluidized boots    Care as per medicine will follow w/ you  Upon discharge [facility] [address] [place] [address] [phone]  Seen and discussed with clinical nurse  Thank you for this consult  Tami Monroe, [id number], [id number]

## 2020-07-17 NOTE — PROGRESS NOTE ADULT - ASSESSMENT
63 yo M with hx of T2DM uncontrolled due to steroids x 10 years with neuropathy and R DFU of the heel, HTN, HLD, MGUS, and decompensated JEAN cirrhosis s/p liver x-plant on 7/2/2020 admitted 6/11/2020 with confusion secondary to liver failure and encephalopathy. Endocrine consulted for glycemic control in the setting of steroid use now on Prednisone 5mg daily. Tolerating POs w/BG values above goal during the day. BG goal (100-180mg/dl). 65 yo M with hx of T2DM uncontrolled due to steroids x 10 years with neuropathy and R DFU of the heel, HTN, HLD, MGUS, and decompensated JEAN cirrhosis s/p liver x-plant on 7/2/2020 admitted 6/11/2020 with confusion secondary to liver failure and encephalopathy. Endocrine consulted for glycemic control in the setting of steroid use now on Prednisone 5mg daily. BG goal (100-180mg/dl).

## 2020-07-17 NOTE — PROGRESS NOTE ADULT - ATTENDING COMMENTS
Kala Rios (pager [phone])  On evenings and weekends, please call [phone] or page endocrine fellow on call.   Please note that this patient may be followed by different provider tomorrow. If no answer, contact endocrine fellow on call.

## 2020-07-17 NOTE — PROGRESS NOTE ADULT - SUBJECTIVE AND OBJECTIVE BOX
HISTORY  64y Male c/b small esophageal varices (12/2019), portal hypertensive gastropathy, ascites, hx SBP, hx hepatic encephalopathy, GAVE syndrome s/p APC, hx duodenal ulcer (5/2019), HTN, HLD, DM, HFpEF (EF 70%), recent ESBL E coli prostate abscess (3/2020 s/p 4 weeks ertapenem), orthostatic hypotension ( on midodrine) who was initially admitted to medicine for hepatic encephalopathy. Hospital course c/b MISA, VRE UTI, acute blood loss anemia, received blood transfusion, EGD done 6/22 with Grade II esophageal varices, acute gastritis with hemorrhage and acute duodenitis with hemorrhage. Patient had worsening mental status, concerning decompensating liver cirrhosis, hepatic encephalopathy patient then transferred to SICU for further care. He is now s/p OLT complicated by delerium, bacteremia, hyper/hypoglycemia.        24 HOUR EVENTS:  - Pre-meal insulin increased to 6 units  - Beasley discontinued     SUBJECTIVE/ROS:  [ ] A ten-point review of systems was otherwise negative except as noted.  [ ] Due to altered mental status/intubation, subjective information were not able to be obtained from the patient. History was obtained, to the extent possible, from review of the chart and collateral sources of information.      NEURO  Exam: awake, alert  Meds: melatonin 3 milliGRAM(s) Oral at bedtime  mirtazapine 30 milliGRAM(s) Oral at bedtime    [x] Adequacy of sedation and pain control has been assessed and adjusted      RESPIRATORY  RR: 12 (07-17-20 @ 00:00) (10 - 36)  SpO2: 100% (07-17-20 @ 00:00) (97% - 100%)  Exam: unlabored, clear to auscultation bilaterally  Mechanical Ventilation: none  [N/A] Extubation Readiness Assessed  Meds: none      CARDIOVASCULAR  HR: 84 (07-17-20 @ 00:00) (75 - 97)  BP: 120/63 (07-17-20 @ 00:00) (95/68 - 156/60)  BP(mean): 86 (07-17-20 @ 00:00) (75 - 99)  Exam: regular rate and rhythm  Cardiac Rhythm: sinus  Perfusion     [x]Adequate   [ ]Inadequate  Mentation   [x]Normal       [ ]Reduced  Extremities  [x]Warm         [ ]Cool  Volume Status [ ]Hypervolemic [x]Euvolemic [ ]Hypovolemic  Meds: tamsulosin 0.8 milliGRAM(s) Oral at bedtime        GI/NUTRITION  Exam: soft, nontender, nondistended  Diet: NPO  Meds: pantoprazole    Tablet 40 milliGRAM(s) Oral before breakfast  ursodiol Capsule 300 milliGRAM(s) Oral two times a day      GENITOURINARY  I&O's Detail    07-15 @ 07:01 - 07-16 @ 07:00  --------------------------------------------------------  IN:    Oral Fluid: 1360 mL    Solution: 100 mL    Solution: 600 mL  Total IN: 2060 mL    OUT:    Indwelling Catheter - Urethral: 2615 mL  Total OUT: 2615 mL    Total NET: -555 mL      07-16 @ 07:01  -  07-17 @ 00:40  --------------------------------------------------------  IN:    Oral Fluid: 1016 mL    Solution: 50 mL    Solution: 300 mL  Total IN: 1366 mL    OUT:    Indwelling Catheter - Urethral: 1175 mL    Voided: 490 mL  Total OUT: 1665 mL    Total NET: -299 mL          07-16    134<L>  |  97  |  31<H>  ----------------------------<  159<H>  4.7   |  27  |  1.40<H>    Ca    9.2      16 Jul 2020 07:12  Phos  3.0     07-16  Mg     1.8     07-16    TPro  5.4<L>  /  Alb  2.8<L>  /  TBili  1.2  /  DBili  0.5<H>  /  AST  16  /  ALT  50<H>  /  AlkPhos  154<H>  07-16    [ ] Beasley catheter, indication: N/A  Meds: magnesium oxide 800 milliGRAM(s) Oral two times a day with meals        HEMATOLOGIC  Meds: aspirin enteric coated 81 milliGRAM(s) Oral daily    [x] VTE Prophylaxis                        9.6    5.04  )-----------( 141      ( 16 Jul 2020 07:12 )             29.7     PT/INR - ( 16 Jul 2020 07:12 )   PT: 11.7 sec;   INR: 0.98 ratio         PTT - ( 16 Jul 2020 07:12 )  PTT:29.6 sec  Transfusion     [ ] PRBC   [ ] Platelets   [ ] FFP   [ ] Cryoprecipitate      INFECTIOUS DISEASES  WBC Count: 5.04 K/uL (07-16 @ 07:12)    RECENT CULTURES:  Specimen Source: .Body Fluid Abdominal Fluid  Date/Time: 07-14 @ 00:32  Culture Results:   No growth  Gram Stain:   polymorphonuclear leukocytes seen  No organisms seen  by cytocentrifuge  Organism: --  Specimen Source: .Blood Blood-Peripheral  Date/Time: 07-12 @ 10:04  Culture Results:   No growth to date.  Gram Stain: --  Organism: --    Meds: linezolid  IVPB 600 milliGRAM(s) IV Intermittent every 12 hours  meropenem  IVPB 1000 milliGRAM(s) IV Intermittent every 8 hours  mycophenolate mofetil 500 milliGRAM(s) Oral <User Schedule>  nystatin    Suspension 479691 Unit(s) Swish and Swallow four times a day  tacrolimus 3 milliGRAM(s) Oral <User Schedule>  trimethoprim   80 mG/sulfamethoxazole 400 mG 1 Tablet(s) Oral daily  valGANciclovir 900 milliGRAM(s) Oral daily        ENDOCRINE  CAPILLARY BLOOD GLUCOSE      POCT Blood Glucose.: 165 mg/dL (16 Jul 2020 22:25)  POCT Blood Glucose.: 211 mg/dL (16 Jul 2020 17:07)  POCT Blood Glucose.: 217 mg/dL (16 Jul 2020 11:50)  POCT Blood Glucose.: 172 mg/dL (16 Jul 2020 08:25)    Meds: insulin glargine Injectable (LANTUS) 10 Unit(s) SubCutaneous at bedtime  insulin lispro (HumaLOG) corrective regimen sliding scale   SubCutaneous three times a day before meals  insulin lispro (HumaLOG) corrective regimen sliding scale   SubCutaneous at bedtime  insulin lispro Injectable (HumaLOG) 6 Unit(s) SubCutaneous three times a day with meals  predniSONE   Tablet 5 milliGRAM(s) Oral daily        ACCESS DEVICES:  [x] Peripheral IV  [ ] Central Venous Line	[ ] R	[ ] L	[ ] IJ	[ ] Fem	[ ] SC	Placed:   [ ] Arterial Line		[ ] R	[ ] L	[ ] Fem	[ ] Rad	[ ] Ax	Placed:   [ ] PICC:					[ ] Mediport  [ ] Urinary Catheter, Date Placed:   [x] Necessity of urinary, arterial, and venous catheters discussed    OTHER MEDICATIONS:  chlorhexidine 2% Cloths 1 Application(s) Topical <User Schedule>  collagenase Ointment 1 Application(s) Topical daily  nystatin Cream 1 Application(s) Topical two times a day      CODE STATUS: full code       IMAGING: < from: CT Chest w/ IV Cont (07.11.20 @ 16:57) >  FINDINGS:  CHEST:   LUNGS AND LARGE AIRWAYS: Patent central airways. Bibasilar compressive atelectasis. Evaluation of the lung parenchyma is somewhat limited secondary to motion artifact.  PLEURA: Small bilateral pleural effusions, right greater than left.  VESSELS: Atherosclerotic changes of the aorta and coronary arteries.  HEART: Heart size is enlarged. No pericardial effusion. Aortic valvular and mitral annular calcifications.  MEDIASTINUM AND RONNIE: No lymphadenopathy.  CHEST WALL AND LOWER NECK: Within normal limits.    ABDOMEN AND PELVIS:  HEPATOBILIARY: Status post liver transplant. Small perihepatic fluid. T-tube in place. No biliary ductal dilatation.  SPLEEN: Within normal limits.  PANCREAS: Within normal limits.  ADRENALS: Within normal limits.  KIDNEYS/URETERS: Nonobstructing bilateral renal calculi measuring up to 5 mm in the left kidney. No hydronephrosis.    BLADDER: Distended.  REPRODUCTIVE ORGANS: Prostate gland is within normal limits.    BOWEL: No bowel obstruction.   PERITONEUM: Small ascites. Surgical drain terminating in the right upper quadrant.  VESSELS: Atherosclerotic changes. Upper abdominal varices are not significantly changed.  RETROPERITONEUM/LYMPH NODES: No lymphadenopathy.    ABDOMINAL WALL: Postsurgicalchanges. Left groin partially imaged fluid collection with overlying skin staples.. Diffuse subcutaneous edema.  BONES: Degenerative changes.     IMPRESSION:   Small bilateral pleural effusions, right greater than left, with associated compressive atelectasis.    Status post liver transplant.     Small volume ascites.    Varices again noted.    Nonobstructing intrarenal calculi.    Partially imaged fluid collection in the left groin with overlying skin staples.      < end of copied text >

## 2020-07-17 NOTE — PROGRESS NOTE ADULT - ATTENDING COMMENTS
64M PMH JEAN cirrhosis s/p OLT on 7/2/20  Post-Op Course Complicated by Encephalopathy and Tremors  Blood Cultures (7/10) with VRE  CT C/A/P (7/11) without obvious intraabdominal source.  Hypoxia prior to positive BCx which may be related to sepsis   Had L groin fluid collection which appears to be consistent with seroma - sample obtained for culture  Would continue Linezolid  TTE limited but without obvious vegetations    on 7/16 had some erythema and induration of the right aspect of the OLT surgical site. No significant erythema appreciated today - continue to follow - doubt infection at this point.     Meropenem Course: 7/2 --> 7/7, 7/9 -->  I would strongly advocate to stop Meropenem. If concern is regarding prior ESBL E coli prostate abscess: patient has completed adequate prior course and was being suppressed successfully with Bactrim prior to transplant. All of his urine cultures (that resulted as positive) after initiation of Bactrim UTI Suppression were with VRE. Reassuringly his CT A/P with IV contrast (7/11) does not suggest any prostate or intraabdominal pathology.  If wanting to cover for urinary source instead can simply place him back on a higher dose of bactrim (i.e. DS PO QD) to continue UTI prevention.  He does have atelectasis on the CT Chest but in any case completed > 7 days of meropenem which would more than adequately cover for PNA.    CMV D+/R- (high risk) - increased to Valcyte 900 mg PO Q24H    I will be away over this upcoming weekend. Please contact the Infectious Diseases Office with any further questions or concerns.     Eusebio Pérez M.D.  [facility] Division of Infectious Disease  8AM-5PM: Pager Number [phone]  After Hours (or if no response): Please contact the Infectious Diseases Office at [phone]

## 2020-07-17 NOTE — CONSULT NOTE ADULT - SUBJECTIVE AND OBJECTIVE BOX
Wound Surgery Consult Note:    HPI:  65 yo M PMHx of decompensated JEAN cirrhosis, HTn, HLD, DM, HFpEF (EF 70%) presents with reports of confusion. Saw his hepatologist yesterday was noted to be more jaundiced and more confused than normal. Per wife his was not oriented to time and place, and forgot how many grandchildren he had. He also urinated in his bedroom. He was confused for roughly 12 hours. He is now back to his baseline mental status. He denies fever, chills, dysuria, increased urinary frequency, flank pain, neck stiffness. No melena, hematochezia, hematemesis. (11 Jun 2020 13:53)    Request for wound care consult for bilateral buttocks wound. Patient was encountered on an alternating air with low air loss surface. He was seen with his clinical nurse who reported that the patient is delirious and unable to be positioned on his sides and he is having frequent episodes of diarrhea.    PAST MEDICAL & SURGICAL HISTORY:  GIB (gastrointestinal bleeding)  GERD with esophagitis: Gastritis &amp; Non Bleeding Ulcers  Hepatic encephalopathy  Obesity  Fatty liver disease, nonalcoholic  Renal stones: 25 years ago  Hypertension  Neuropathy  Hypercholesteremia  Diabetes  S/P cholecystectomy    REVIEW OF SYSTEMS  Unable to obtain due to patient's condition    MEDICATIONS  (STANDING):  aspirin enteric coated 81 milliGRAM(s) Oral daily  chlorhexidine 2% Cloths 1 Application(s) Topical <User Schedule>  collagenase Ointment 1 Application(s) Topical daily  insulin glargine Injectable (LANTUS) 10 Unit(s) SubCutaneous at bedtime  insulin lispro (HumaLOG) corrective regimen sliding scale   SubCutaneous three times a day before meals  insulin lispro (HumaLOG) corrective regimen sliding scale   SubCutaneous at bedtime  insulin lispro Injectable (HumaLOG) 6 Unit(s) SubCutaneous three times a day with meals  linezolid  IVPB 600 milliGRAM(s) IV Intermittent every 12 hours  magnesium oxide 800 milliGRAM(s) Oral two times a day with meals  melatonin 3 milliGRAM(s) Oral at bedtime  meropenem  IVPB 1000 milliGRAM(s) IV Intermittent every 8 hours  mirtazapine 30 milliGRAM(s) Oral at bedtime  mycophenolate mofetil 500 milliGRAM(s) Oral <User Schedule>  nystatin    Suspension 246181 Unit(s) Swish and Swallow four times a day  nystatin Cream 1 Application(s) Topical two times a day  pantoprazole    Tablet 40 milliGRAM(s) Oral before breakfast  predniSONE   Tablet 5 milliGRAM(s) Oral daily  tacrolimus 3 milliGRAM(s) Oral <User Schedule>  tamsulosin 0.8 milliGRAM(s) Oral at bedtime  trimethoprim   80 mG/sulfamethoxazole 400 mG 1 Tablet(s) Oral daily  ursodiol Capsule 300 milliGRAM(s) Oral two times a day  valGANciclovir 900 milliGRAM(s) Oral daily    MEDICATIONS  (PRN):  QUEtiapine 25 milliGRAM(s) Oral every 4 hours PRN agitation    Allergies    codeine (Anaphylaxis)    Intolerances    SOCIAL HISTORY:  unable to obtain due to patient's condition    FAMILY HISTORY:  Family history of type 2 diabetes mellitus  Family history of hypertension  Family history of stomach cancer    Vital Signs Last 24 Hrs  T(C): 36.8 (17 Jul 2020 10:30), Max: 36.8 (17 Jul 2020 03:00)  T(F): 98.3 (17 Jul 2020 10:30), Max: 98.3 (17 Jul 2020 10:30)  HR: 86 (17 Jul 2020 10:30) (78 - 97)  BP: 148/64 (17 Jul 2020 10:30) (93/57 - 155/62)  BP(mean): 75 (17 Jul 2020 09:00) (67 - 98)  RR: 20 (17 Jul 2020 10:30) (10 - 36)  SpO2: 100% (17 Jul 2020 10:30) (97% - 100%)    Physical Exam:  General: Confused, agitated, MO   Respiratory: no SOB on O2 supplement via NC  Gastrointestinal: soft NT/ND  Neurology: nonverbal, not following commands  Musculoskeletal: no contractures or deformities  Vascular: BLE edema equal, no acute ischemia noted  Skin:  Bilateral buttocks/sacral wounds - L 6cm x W 8cm x D unknown - extremely thin layer of moist slough over the central area with peripheral epithelial islets visible, scant serosanguinous drainage, No odor, erythema, increased warmth, tenderness, induration, fluctuance    LABS:  07-17    131<L>  |  97  |  30<H>  ----------------------------<  142<H>  4.9   |  23  |  1.42<H>    Ca    9.2      17 Jul 2020 06:59  Phos  2.9     07-17  Mg     1.9     07-17    TPro  5.6<L>  /  Alb  3.1<L>  /  TBili  1.1  /  DBili  0.4<H>  /  AST  15  /  ALT  40  /  AlkPhos  137<H>  07-17                          10.0   4.75  )-----------( 141      ( 17 Jul 2020 06:59 )             30.1     PT/INR - ( 17 Jul 2020 06:59 )   PT: 11.7 sec;   INR: 0.98 ratio         PTT - ( 17 Jul 2020 06:59 )  PTT:29.7 sec

## 2020-07-17 NOTE — PROGRESS NOTE ADULT - ATTENDING COMMENTS
seen and assessed on 7/17.     confused. agitated.  on remeron/seroquel.    good graft function.     immuno- tac/pred/cellcept

## 2020-07-17 NOTE — PROGRESS NOTE ADULT - PROBLEM SELECTOR PLAN 1
-test BG AC/HS  -c/w Lantus 10 units QHS  -Increase Humalog 6 units AC meals  -c/w Humalog moderate correction scale AC and low HS scale  DISPO: basal/bolus, with doses TBD  -Pt can resume f/u with Dr. Mathur or   [facility] [address] [place] [address] [address] [place] [phone]. -test BG AC/HS  -c/w Lantus 10 units QHS  -Increase Humalog 7 units AC meals  -change to Humalog low correction scale AC and low HS scale  DISPO: basal/bolus, with doses TBD  -Pt can resume f/u with Dr. Mathur or   [facility] [address] [place] [address] [address] [place] [phone].

## 2020-07-17 NOTE — PROGRESS NOTE ADULT - ASSESSMENT
64 M hx of DM, HLD, GERD, HFpEF with mild LV diastolic dysfunction, and decompensated JEAN cirrhosis c/b ascites with hx of SBP, HE, duodenal ulcer, GAVE and duodenal AVM s/p APC (last on 10/11/19), UNOS listed for liver transplantation at [facility]. 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 He is now re-admitted with hepatic encephalopathy and VRE UTI, also with MISA on CKD. S/p liver transplant 7/2.    Impression:  #S/p liver transplant 7/2: liver enzymes continue to downtrend  OR details:  - L groin cutdown for vv bypass   - anastomosis: bicaval end-end/CBD duct-duct/HA & PV end-end, all standard anatomy.    - IOC with good flow  - Simulect induction. 500mg Solumedrol  - JPs x3 with T-Tube  - 14U pRBC, 14U FFP, 10 PLT, 11 CRYO, 500mL returned from cell saver, EBL 5L, UOP 1100mL  Recipient Info:   ABO: A  CMV:  Neg  EBV Positive  Donor:  Donor ID:  [id number] Match: [id number]  Age: 29 ABO:  A1 High Risk:   No COD: Cardiovascular  Anti CMV positive, EBV IgG- positive  HepBcAb-neg, Hepatitis C-DANA- neg, Hepatitis C ab-neg  # Elevated liver enzymes: nearly resolved, mild elevated ALP. Possible secondary to DILI vs sepsis related. Has VRE and on linezolid and meropenem.  # VRE on culture from 7/10: appears to have resolved most recent culture negative from 7/12. On mari and linezolid. CT showing groin hematoma, s/p aspiration without c/f infection on studies.  #Tremors: improved significantly but ongoing symptoms c/f taco toxicity. May need medication change based on clinical course  #Halluciniations/agitation: improving. At night is agitated with issues in sleep wake cycle. Briefly on precedex now off for 2 days. Resolved hallucinations  #GI bleed - Hgb stable. No overt bleeding. 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 GAVE and PHG should have improved w/ liver transplant, most likely etiology is NG tube trauma prior if any c/f GI bleed  # MISA: Cr 1.4. Making urine and voiding without issue. No bob in place. Possibly secondary to diuresis vs medication side effect. Appears volume status improved  # Abnormal Chest X-Ray c/f evolving pna vs atelectasis: now on meropenem and linezolid. No signs of active infection  # Hypertension on nifedipine now. BP improved  # Lower ext edema: diuresing well, improving on lasix  #R posterior heel wound w/ overlying eschar –Local wound care. no signs of infections, XR neg for gas, evaluated by podiatry and ID. MRI w/o contrast limited, but no overt signs of active infection.  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   # Hyperglycemia with episodes of overcorrection    Recommendation:  - meropenem/linezolid per transplant ID; plan to d/c meropenem per ID/surgical team  - physical therapy daily to get OOB and ambulating  - hold diuretics and albumin today  - continue with standing magnesium  - monitor renal function  - prednisone 5 mg po daily  - Cellcept 500mg BID  - continue with tacro dosing per transplant surgery  - consider changing tacro target to 7-8 once cellcept dose increased to full dose pending clinical status given mild elevation in Cr  - will consider switch to CsA given c/f neurotoxicity from tacro; however this is improving  - remron, melatonin per psych; reorient as needed  - consider downgrade to regular floor to avoid ICU delerium  - glycemic control   - c/w nystatin, Bactrim, valcyte ppx  - trend Hb- PO PPI BID, monitor bowel movements  - aspirin per surgery  - diet as tolerated  - wound care/nursing for R heel ulcer  - monitor I/Os now with bob removed  - agitation precautions  - f/u transplant surgery recs  - rest of care per SICU team  - transplant hepatology will follow

## 2020-07-17 NOTE — PROGRESS NOTE ADULT - ATTENDING COMMENTS
S/p renal transplant, recovering well  Continue nocturnal Remeron and Seroquel for sleep/delirium at night  Hemodynamically stable saturating well on RA  PO, hepatic function stable  Daily Protonix, HCT stable  Abx Zyvox, 2 wks course of  Meropenem, repeat cultures including culture from  groin collection neg, afebrile, no leucocytosis  ISS/Lantus dose adjustment   Transplant meds  PT/mobilization  Discussed with transplant team, transfer

## 2020-07-17 NOTE — PROGRESS NOTE ADULT - ASSESSMENT
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    [] POD 15 s/p OLT   - Good graft function, LFTs stable  - Liver doppler (7/12) with patent vessels  - Immuno: FK 3mg bid, MMF 500mg bid; Pred 5mg daily  - PPX: valcyte/nystatin/bactrim  - Continue ASA 81mg daily  - LE edema: Hold Lasix today   - h/o UGI bleed: Protonix 40mg daily   - Cont Mag 800mg BID, keep Mag >2  - Regular, carbohydrate restricted diet  - Pain control PRN  - pysch recs appreciated   - DVT PPx: SCDs at all times    [] VRE bacteremia (7/10)  - ID following: on meropenem/linezolid; dc meropenem tonight    - Bld cx from 7/12 with NG, L groin collection aspirated (7/13) -NG    [] Hyperglycemia  - Appreciate endocrinology recommendations  - Cont Lantus and Humalog insulin wih SSI coverage  - Fingersticks ac and qhs    [] Heel ulcer  Continue local wound care w/ santyl and DSD as per podiatry    [] Dispo:   - Transfer to [facility]  - continue 1:1

## 2020-07-18 LAB
ALBUMIN SERPL ELPH-MCNC: 2.9 G/DL — LOW (ref 3.3–5)
ALP SERPL-CCNC: 132 U/L — HIGH (ref 40–120)
ALT FLD-CCNC: 30 U/L — SIGNIFICANT CHANGE UP (ref 10–45)
AMMONIA BLD-MCNC: 27 UMOL/L — SIGNIFICANT CHANGE UP (ref 11–55)
ANION GAP SERPL CALC-SCNC: 7 MMOL/L — SIGNIFICANT CHANGE UP (ref 5–17)
APTT BLD: 30.8 SEC — SIGNIFICANT CHANGE UP (ref 27.5–35.5)
AST SERPL-CCNC: 15 U/L — SIGNIFICANT CHANGE UP (ref 10–40)
BILIRUB DIRECT SERPL-MCNC: 0.4 MG/DL — HIGH (ref 0–0.2)
BILIRUB INDIRECT FLD-MCNC: 0.5 MG/DL — SIGNIFICANT CHANGE UP (ref 0.2–1)
BILIRUB SERPL-MCNC: 0.9 MG/DL — SIGNIFICANT CHANGE UP (ref 0.2–1.2)
BUN SERPL-MCNC: 34 MG/DL — HIGH (ref 7–23)
CALCIUM SERPL-MCNC: 9.2 MG/DL — SIGNIFICANT CHANGE UP (ref 8.4–10.5)
CHLORIDE SERPL-SCNC: 101 MMOL/L — SIGNIFICANT CHANGE UP (ref 96–108)
CO2 SERPL-SCNC: 27 MMOL/L — SIGNIFICANT CHANGE UP (ref 22–31)
CREAT SERPL-MCNC: 1.52 MG/DL — HIGH (ref 0.5–1.3)
CULTURE RESULTS: SIGNIFICANT CHANGE UP
GLUCOSE BLDC GLUCOMTR-MCNC: 101 MG/DL — HIGH (ref 70–99)
GLUCOSE BLDC GLUCOMTR-MCNC: 180 MG/DL — HIGH (ref 70–99)
GLUCOSE BLDC GLUCOMTR-MCNC: 191 MG/DL — HIGH (ref 70–99)
GLUCOSE BLDC GLUCOMTR-MCNC: 231 MG/DL — HIGH (ref 70–99)
GLUCOSE SERPL-MCNC: 145 MG/DL — HIGH (ref 70–99)
HCT VFR BLD CALC: 27.5 % — LOW (ref 39–50)
HGB BLD-MCNC: 9 G/DL — LOW (ref 13–17)
INR BLD: 1.01 RATIO — SIGNIFICANT CHANGE UP (ref 0.88–1.16)
MAGNESIUM SERPL-MCNC: 1.8 MG/DL — SIGNIFICANT CHANGE UP (ref 1.6–2.6)
MCHC RBC-ENTMCNC: 32 PG — SIGNIFICANT CHANGE UP (ref 27–34)
MCHC RBC-ENTMCNC: 32.7 GM/DL — SIGNIFICANT CHANGE UP (ref 32–36)
MCV RBC AUTO: 97.9 FL — SIGNIFICANT CHANGE UP (ref 80–100)
NRBC # BLD: 0 /100 WBCS — SIGNIFICANT CHANGE UP (ref 0–0)
PHOSPHATE SERPL-MCNC: 3.1 MG/DL — SIGNIFICANT CHANGE UP (ref 2.5–4.5)
PLATELET # BLD AUTO: 125 K/UL — LOW (ref 150–400)
POTASSIUM SERPL-MCNC: 5.1 MMOL/L — SIGNIFICANT CHANGE UP (ref 3.5–5.3)
POTASSIUM SERPL-SCNC: 5.1 MMOL/L — SIGNIFICANT CHANGE UP (ref 3.5–5.3)
PROCALCITONIN SERPL-MCNC: 0.55 NG/ML — HIGH (ref 0.02–0.1)
PROT SERPL-MCNC: 5.2 G/DL — LOW (ref 6–8.3)
PROTHROM AB SERPL-ACNC: 12 SEC — SIGNIFICANT CHANGE UP (ref 10.6–13.6)
RBC # BLD: 2.81 M/UL — LOW (ref 4.2–5.8)
RBC # FLD: 19.1 % — HIGH (ref 10.3–14.5)
SODIUM SERPL-SCNC: 135 MMOL/L — SIGNIFICANT CHANGE UP (ref 135–145)
SPECIMEN SOURCE: SIGNIFICANT CHANGE UP
TACROLIMUS SERPL-MCNC: 6.3 NG/ML — SIGNIFICANT CHANGE UP
WBC # BLD: 4.13 K/UL — SIGNIFICANT CHANGE UP (ref 3.8–10.5)
WBC # FLD AUTO: 4.13 K/UL — SIGNIFICANT CHANGE UP (ref 3.8–10.5)

## 2020-07-18 PROCEDURE — 99232 SBSQ HOSP IP/OBS MODERATE 35: CPT

## 2020-07-18 PROCEDURE — 99232 SBSQ HOSP IP/OBS MODERATE 35: CPT | Mod: GC,24

## 2020-07-18 PROCEDURE — 99233 SBSQ HOSP IP/OBS HIGH 50: CPT | Mod: GC

## 2020-07-18 RX ORDER — MYCOPHENOLATE MOFETIL 250 MG/1
750 CAPSULE ORAL
Refills: 0 | Status: DISCONTINUED | OUTPATIENT
Start: 2020-07-18 | End: 2020-07-27

## 2020-07-18 RX ORDER — OLANZAPINE 15 MG/1
1.25 TABLET, FILM COATED ORAL EVERY 6 HOURS
Refills: 0 | Status: DISCONTINUED | OUTPATIENT
Start: 2020-07-18 | End: 2020-07-20

## 2020-07-18 RX ORDER — TACROLIMUS 5 MG/1
2 CAPSULE ORAL
Refills: 0 | Status: DISCONTINUED | OUTPATIENT
Start: 2020-07-18 | End: 2020-07-19

## 2020-07-18 RX ORDER — OLANZAPINE 15 MG/1
2.5 TABLET, FILM COATED ORAL EVERY 6 HOURS
Refills: 0 | Status: DISCONTINUED | OUTPATIENT
Start: 2020-07-18 | End: 2020-07-20

## 2020-07-18 RX ADMIN — Medication 7 UNIT(S): at 18:36

## 2020-07-18 RX ADMIN — Medication 100000 UNIT(S): at 18:35

## 2020-07-18 RX ADMIN — MIRTAZAPINE 15 MILLIGRAM(S): 45 TABLET, ORALLY DISINTEGRATING ORAL at 21:31

## 2020-07-18 RX ADMIN — TACROLIMUS 2 MILLIGRAM(S): 5 CAPSULE ORAL at 20:07

## 2020-07-18 RX ADMIN — TACROLIMUS 3 MILLIGRAM(S): 5 CAPSULE ORAL at 08:32

## 2020-07-18 RX ADMIN — CHLORHEXIDINE GLUCONATE 1 APPLICATION(S): 213 SOLUTION TOPICAL at 09:12

## 2020-07-18 RX ADMIN — TAMSULOSIN HYDROCHLORIDE 0.8 MILLIGRAM(S): 0.4 CAPSULE ORAL at 21:31

## 2020-07-18 RX ADMIN — OLANZAPINE 2.5 MILLIGRAM(S): 15 TABLET, FILM COATED ORAL at 16:36

## 2020-07-18 RX ADMIN — NYSTATIN CREAM 1 APPLICATION(S): 100000 CREAM TOPICAL at 18:39

## 2020-07-18 RX ADMIN — NYSTATIN CREAM 1 APPLICATION(S): 100000 CREAM TOPICAL at 05:39

## 2020-07-18 RX ADMIN — INSULIN GLARGINE 10 UNIT(S): 100 INJECTION, SOLUTION SUBCUTANEOUS at 21:31

## 2020-07-18 RX ADMIN — PANTOPRAZOLE SODIUM 40 MILLIGRAM(S): 20 TABLET, DELAYED RELEASE ORAL at 05:39

## 2020-07-18 RX ADMIN — Medication 5 MILLIGRAM(S): at 21:31

## 2020-07-18 RX ADMIN — Medication 81 MILLIGRAM(S): at 12:10

## 2020-07-18 RX ADMIN — VALGANCICLOVIR 900 MILLIGRAM(S): 450 TABLET, FILM COATED ORAL at 12:10

## 2020-07-18 RX ADMIN — Medication 100000 UNIT(S): at 21:43

## 2020-07-18 RX ADMIN — Medication 100000 UNIT(S): at 05:40

## 2020-07-18 RX ADMIN — Medication 2: at 18:36

## 2020-07-18 RX ADMIN — URSODIOL 300 MILLIGRAM(S): 250 TABLET, FILM COATED ORAL at 05:39

## 2020-07-18 RX ADMIN — Medication 100000 UNIT(S): at 12:10

## 2020-07-18 RX ADMIN — URSODIOL 300 MILLIGRAM(S): 250 TABLET, FILM COATED ORAL at 18:32

## 2020-07-18 RX ADMIN — QUETIAPINE FUMARATE 25 MILLIGRAM(S): 200 TABLET, FILM COATED ORAL at 18:33

## 2020-07-18 RX ADMIN — MYCOPHENOLATE MOFETIL 750 MILLIGRAM(S): 250 CAPSULE ORAL at 20:07

## 2020-07-18 RX ADMIN — Medication 7 UNIT(S): at 08:53

## 2020-07-18 RX ADMIN — MYCOPHENOLATE MOFETIL 500 MILLIGRAM(S): 250 CAPSULE ORAL at 08:32

## 2020-07-18 RX ADMIN — Medication 1 TABLET(S): at 12:10

## 2020-07-18 RX ADMIN — Medication 4: at 08:53

## 2020-07-18 NOTE — CHART NOTE - NSCHARTNOTEFT_GEN_A_CORE
Pt noted to be more agitated had an episode of slapping the RN on her thigh in the presence of constant observation (PCA). Pt was seen and examined was easily reoriented. RN seen who denies acute injury or pain. Psych was notified and recommended to add PRN zyprex for agitation. Transplant surgeon made aware of the event and plan.

## 2020-07-18 NOTE — PROGRESS NOTE ADULT - SUBJECTIVE AND OBJECTIVE BOX
Chief Complaint:  Patient is a 64y old  Male who presents with a chief complaint of Liver failure, hepatic encephalopathy (2020 10:53)      Interval Events:     Patient transferred to floor from [facility] yesterday evening. He is oriented to self and place but answers some questions inappropriately today. Floor staff concerned patient is having visual hallucinations.     Patient has no complaints this morning.       Allergies:  codeine (Anaphylaxis)      Hospital Medications:  aspirin enteric coated 81 milliGRAM(s) Oral daily  chlorhexidine 2% Cloths 1 Application(s) Topical <User Schedule>  collagenase Ointment 1 Application(s) Topical daily  insulin glargine Injectable (LANTUS) 10 Unit(s) SubCutaneous at bedtime  insulin lispro (HumaLOG) corrective regimen sliding scale   SubCutaneous three times a day before meals  insulin lispro (HumaLOG) corrective regimen sliding scale   SubCutaneous at bedtime  insulin lispro Injectable (HumaLOG) 7 Unit(s) SubCutaneous three times a day with meals  linezolid  IVPB 600 milliGRAM(s) IV Intermittent every 12 hours  magnesium oxide 800 milliGRAM(s) Oral two times a day with meals  melatonin 5 milliGRAM(s) Oral at bedtime  mirtazapine 15 milliGRAM(s) Oral at bedtime  mycophenolate mofetil 500 milliGRAM(s) Oral <User Schedule>  nystatin    Suspension 231120 Unit(s) Swish and Swallow four times a day  nystatin Cream 1 Application(s) Topical two times a day  pantoprazole    Tablet 40 milliGRAM(s) Oral before breakfast  predniSONE   Tablet 5 milliGRAM(s) Oral daily  QUEtiapine 25 milliGRAM(s) Oral <User Schedule>  tacrolimus 3 milliGRAM(s) Oral <User Schedule>  tamsulosin 0.8 milliGRAM(s) Oral at bedtime  trimethoprim   80 mG/sulfamethoxazole 400 mG 1 Tablet(s) Oral daily  ursodiol Capsule 300 milliGRAM(s) Oral two times a day  valGANciclovir 900 milliGRAM(s) Oral daily      PMHX/PSHX:  GIB (gastrointestinal bleeding)  GERD with esophagitis  Hepatic encephalopathy  Obesity  Fatty liver disease, nonalcoholic  Renal stones  Hypertension  Neuropathy  Hypercholesteremia  Diabetes  S/P cholecystectomy  No significant past surgical history      Family history:  Family history of type 2 diabetes mellitus  Family history of hypertension  Family history of stomach cancer  No pertinent family history in first degree relatives      ROS:     General:  No weight loss, fevers, chills, night sweats, fatigue   Eyes:  No vision changes  ENT:  No sore throat, pain, runny nose  CV:  No chest pain, palpitations, dizziness   Resp:  No SOB, cough, wheezing  GI:  See HPI  :  No burning with urination, hematuria  Muscle:  No pain, weakness  Neuro:  No weakness/tingling, memory problems   Psych:  No fatigue, insomnia, mood problems, depression  Heme:  No easy bruisability  Skin:  No rash, edema         PHYSICAL EXAM:     GENERAL:  Well developed, no distress, answers some questions inappropriately   HEENT:  NC/AT,  conjunctivae clear, sclera anicteric  CHEST:  Full & symmetric excursion, no increased effort w/ respirations  HEART:  Regular rhythm & rate  ABDOMEN:  Soft, non-tender, non-distended +surgical incision remains intact +dressing over prior ANDREINA drain site C/D/I   EXTREMITIES:  no LE  edema  SKIN:  No rash/erythema/ecchymoses/petechiae/wounds/jaundice  NEURO:  Alert, orientedx2 (person, place) +bilateral hand tremors     Vital Signs:  Vital Signs Last 24 Hrs  T(C): 36.5 (2020 08:46), Max: 36.8 (2020 14:00)  T(F): 97.7 (2020 08:46), Max: 98.3 (2020 14:00)  HR: 784 (2020 09:07) (78 - 784)  BP: 136/63 (2020 08:46) (120/68 - 137/65)  BP(mean): 90 (2020 05:00) (90 - 90)  RR: 19 (2020 08:46) (18 - 20)  SpO2: 97% (2020 09:07) (97% - 100%)  Daily     Daily Weight in k.6 (2020 05:00)    LABS:                        9.0    4.13  )-----------( 125      ( 2020 06:12 )             27.5     07-18    135  |  101  |  34<H>  ----------------------------<  145<H>  5.1   |  27  |  1.52<H>    Ca    9.2      2020 06:12  Phos  3.1     07-18  Mg     1.8     07-18    TPro  5.2<L>  /  Alb  2.9<L>  /  TBili  0.9  /  DBili  0.4<H>  /  AST  15  /  ALT  30  /  AlkPhos  132<H>  07-18    LIVER FUNCTIONS - ( 2020 06:12 )  Alb: 2.9 g/dL / Pro: 5.2 g/dL / ALK PHOS: 132 U/L / ALT: 30 U/L / AST: 15 U/L / GGT: x           PT/INR - ( 2020 06:12 )   PT: 12.0 sec;   INR: 1.01 ratio    PTT - ( 2020 06:12 )  PTT:30.8 sec    Amylase Serum--      Lipase serum--       [id number]      Imaging:

## 2020-07-18 NOTE — PROGRESS NOTE ADULT - ATTENDING COMMENTS
continues to have delirium with poor sleep.  psych following    good allograft function    immuno: tac/cellcept/pred

## 2020-07-18 NOTE — PROGRESS NOTE ADULT - SUBJECTIVE AND OBJECTIVE BOX
Patient is a 64y old  Male who presents with a chief complaint of Liver failure, hepatic encephalopathy (18 Jul 2020 11:43)    Being followed by ID for        Interval history:  No other acute events      ROS:  No cough,SOB,CP  No N/V/D  No abd pain  No urinary complaints  No HA  No joint or limb pain  No other complaints    PAST MEDICAL & SURGICAL HISTORY:  GIB (gastrointestinal bleeding)  GERD with esophagitis: Gastritis &amp; Non Bleeding Ulcers  Hepatic encephalopathy  Obesity  Fatty liver disease, nonalcoholic  Renal stones: 25 years ago  Hypertension  Neuropathy  Hypercholesteremia  Diabetes  S/P cholecystectomy    Allergies    codeine (Anaphylaxis)    Intolerances      Antimicrobials:    linezolid  IVPB 600 milliGRAM(s) IV Intermittent every 12 hours  nystatin    Suspension 248240 Unit(s) Swish and Swallow four times a day  trimethoprim   80 mG/sulfamethoxazole 400 mG 1 Tablet(s) Oral daily  valGANciclovir 900 milliGRAM(s) Oral daily    MEDICATIONS  (STANDING):  aspirin enteric coated 81 milliGRAM(s) Oral daily  chlorhexidine 2% Cloths 1 Application(s) Topical <User Schedule>  collagenase Ointment 1 Application(s) Topical daily  insulin glargine Injectable (LANTUS) 10 Unit(s) SubCutaneous at bedtime  insulin lispro (HumaLOG) corrective regimen sliding scale   SubCutaneous three times a day before meals  insulin lispro (HumaLOG) corrective regimen sliding scale   SubCutaneous at bedtime  insulin lispro Injectable (HumaLOG) 7 Unit(s) SubCutaneous three times a day with meals  linezolid  IVPB 600 milliGRAM(s) IV Intermittent every 12 hours  magnesium oxide 800 milliGRAM(s) Oral two times a day with meals  melatonin 5 milliGRAM(s) Oral at bedtime  mirtazapine 15 milliGRAM(s) Oral at bedtime  mycophenolate mofetil 750 milliGRAM(s) Oral <User Schedule>  nystatin    Suspension 444144 Unit(s) Swish and Swallow four times a day  nystatin Cream 1 Application(s) Topical two times a day  pantoprazole    Tablet 40 milliGRAM(s) Oral before breakfast  predniSONE   Tablet 5 milliGRAM(s) Oral daily  QUEtiapine 25 milliGRAM(s) Oral <User Schedule>  tacrolimus 2 milliGRAM(s) Oral <User Schedule>  tamsulosin 0.8 milliGRAM(s) Oral at bedtime  trimethoprim   80 mG/sulfamethoxazole 400 mG 1 Tablet(s) Oral daily  ursodiol Capsule 300 milliGRAM(s) Oral two times a day  valGANciclovir 900 milliGRAM(s) Oral daily      Vital Signs Last 24 Hrs  T(C): 36.4 (07-18-20 @ 13:00), Max: 36.8 (07-17-20 @ 14:00)  T(F): 97.6 (07-18-20 @ 13:00), Max: 98.3 (07-17-20 @ 14:00)  HR: 88 (07-18-20 @ 13:00) (78 - 784)  BP: 114/71 (07-18-20 @ 13:00) (114/71 - 137/65)  BP(mean): 90 (07-18-20 @ 05:00) (90 - 90)  RR: 18 (07-18-20 @ 13:00) (18 - 20)  SpO2: 98% (07-18-20 @ 13:00) (97% - 100%)    Physical Exam:    Constitutional well preserved,comfortable,pleasant    HEENT PERRLA EOMI,No pallor or icterus    No oral exudate or erythema    Neck supple no JVD or LN    Chest Good AE,CTA    CVS RRR S1 S2 WNl No murmur or rub or gallop    Abd soft BS normal No tenderness no masses    Ext No cyanosis clubbing or edema    IV site no erythema tenderness or discharge    Joints no swelling or LOM    CNS AAO X 3 no focal    Lab Data:                          9.0    4.13  )-----------( 125      ( 18 Jul 2020 06:12 )             27.5       07-18    135  |  101  |  34<H>  ----------------------------<  145<H>  5.1   |  27  |  1.52<H>    Ca    9.2      18 Jul 2020 06:12  Phos  3.1     07-18  Mg     1.8     07-18    TPro  5.2<L>  /  Alb  2.9<L>  /  TBili  0.9  /  DBili  0.4<H>  /  AST  15  /  ALT  30  /  AlkPhos  132<H>  07-18          .Body Fluid Abdominal Fluid  07-14-20   No growth  --    polymorphonuclear leukocytes seen  No organisms seen  by cytocentrifuge      .Blood Blood-Peripheral  07-12-20   No Growth Final  --  --        WBC Count: 4.13 (07-18-20 @ 06:12)  WBC Count: 4.75 (07-17-20 @ 06:59)  WBC Count: 5.04 (07-16-20 @ 07:12)  WBC Count: 4.31 (07-15-20 @ 07:28)  WBC Count: 4.68 (07-14-20 @ 07:05)  WBC Count: 4.58 (07-13-20 @ 05:25)  WBC Count: 5.51 (07-12-20 @ 01:54)  WBC Count: 5.78 (07-11-20 @ 21:24) Patient is a 64y old  Male who presents with a chief complaint of Liver failure, hepatic encephalopathy (18 Jul 2020 11:43)    Being followed by ID for        Interval history:  pt remains confused, waxing and waning   afebrile   No other acute events        PAST MEDICAL & SURGICAL HISTORY:  GIB (gastrointestinal bleeding)  GERD with esophagitis: Gastritis &amp; Non Bleeding Ulcers  Hepatic encephalopathy  Obesity  Fatty liver disease, nonalcoholic  Renal stones: 25 years ago  Hypertension  Neuropathy  Hypercholesteremia  Diabetes  S/P cholecystectomy    Allergies    codeine (Anaphylaxis)    Intolerances      Antimicrobials:    linezolid  IVPB 600 milliGRAM(s) IV Intermittent every 12 hours  nystatin    Suspension 987392 Unit(s) Swish and Swallow four times a day  trimethoprim   80 mG/sulfamethoxazole 400 mG 1 Tablet(s) Oral daily  valGANciclovir 900 milliGRAM(s) Oral daily    MEDICATIONS  (STANDING):  aspirin enteric coated 81 milliGRAM(s) Oral daily  chlorhexidine 2% Cloths 1 Application(s) Topical <User Schedule>  collagenase Ointment 1 Application(s) Topical daily  insulin glargine Injectable (LANTUS) 10 Unit(s) SubCutaneous at bedtime  insulin lispro (HumaLOG) corrective regimen sliding scale   SubCutaneous three times a day before meals  insulin lispro (HumaLOG) corrective regimen sliding scale   SubCutaneous at bedtime  insulin lispro Injectable (HumaLOG) 7 Unit(s) SubCutaneous three times a day with meals  linezolid  IVPB 600 milliGRAM(s) IV Intermittent every 12 hours  magnesium oxide 800 milliGRAM(s) Oral two times a day with meals  melatonin 5 milliGRAM(s) Oral at bedtime  mirtazapine 15 milliGRAM(s) Oral at bedtime  mycophenolate mofetil 750 milliGRAM(s) Oral <User Schedule>  nystatin    Suspension 601709 Unit(s) Swish and Swallow four times a day  nystatin Cream 1 Application(s) Topical two times a day  pantoprazole    Tablet 40 milliGRAM(s) Oral before breakfast  predniSONE   Tablet 5 milliGRAM(s) Oral daily  QUEtiapine 25 milliGRAM(s) Oral <User Schedule>  tacrolimus 2 milliGRAM(s) Oral <User Schedule>  tamsulosin 0.8 milliGRAM(s) Oral at bedtime  trimethoprim   80 mG/sulfamethoxazole 400 mG 1 Tablet(s) Oral daily  ursodiol Capsule 300 milliGRAM(s) Oral two times a day  valGANciclovir 900 milliGRAM(s) Oral daily      Vital Signs Last 24 Hrs  T(C): 36.4 (07-18-20 @ 13:00), Max: 36.8 (07-17-20 @ 14:00)  T(F): 97.6 (07-18-20 @ 13:00), Max: 98.3 (07-17-20 @ 14:00)  HR: 88 (07-18-20 @ 13:00) (78 - 784)  BP: 114/71 (07-18-20 @ 13:00) (114/71 - 137/65)  BP(mean): 90 (07-18-20 @ 05:00) (90 - 90)  RR: 18 (07-18-20 @ 13:00) (18 - 20)  SpO2: 98% (07-18-20 @ 13:00) (97% - 100%)    Physical Exam:    Constitutional sitting in chair with aide at side    No oral exudate or erythema    Neck supple no JVD or LN    Chest Good AE,CTA    CVS S1 S2   Abd soft BS normal No tenderness  wound : clean , dry, intact    Ext No cyanosis clubbing or edema left proximal thigh incision - clean, full no erythema     right heel in dressing     IV site no erythema tenderness or discharge      per wound care yesterday : Skin:  Bilateral buttocks/sacral wounds - L 6cm x W 8cm x D unknown - extremely thin layer of moist slough over the central area with peripheral epithelial islets visible, scant serosanguinous drainage, No odor, erythema, increased warmth, tenderness, induration, fluctuance    Lab Data:                          9.0    4.13  )-----------( 125      ( 18 Jul 2020 06:12 )             27.5       07-18    135  |  101  |  34<H>  ----------------------------<  145<H>  5.1   |  27  |  1.52<H>    Ca    9.2      18 Jul 2020 06:12  Phos  3.1     07-18  Mg     1.8     07-18    TPro  5.2<L>  /  Alb  2.9<L>  /  TBili  0.9  /  DBili  0.4<H>  /  AST  15  /  ALT  30  /  AlkPhos  132<H>  07-18          .Body Fluid Abdominal Fluid  07-14-20   No growth  --    polymorphonuclear leukocytes seen  No organisms seen  by cytocentrifuge      .Blood Blood-Peripheral  07-12-20   No Growth Final  --  --    Culture - Blood (07.12.20 @ 10:04)    Specimen Source: .Blood Blood-Peripheral    Culture Results:   No Growth Final    Culture - Blood (07.10.20 @ 00:22)    Gram Stain:   Growth in aerobic bottle: Gram positive cocci in pairs    -  Ampicillin: R >8 Predicts results to ampicillin/sulbactam, amoxacillin-clavulanate and  piperacillin-tazobactam.    -  Linezolid: S 1    -  Gentamicin synergy: S    -  Streptomycin synergy: R    -  Daptomycin: S 4    -  Vancomycin: R >16    -  Vancomycin resistant Enterococcus sp.: Detec    Specimen Source: .Blood Blood-Peripheral    Organism: Enterococcus faecium (vancomycin resistant)    Organism: Blood Culture PCR    Culture Results:   Growth in aerobic bottle: Enterococcus faecium (vancomycin resistant)  "Due to technical problems, Proteus sp. will Not be reported as part of  the BCID panel until further notice"  ***Blood Panel PCR results on this specimen are available  approximately 3 hours after the Gram stain result.***  Gram stain, PCR, and/or culture results may not always  correspond due to difference in methodologies.  ************************************************************  This PCR assay was performed using [employer].  The following targets are tested for: Enterococcus,  vancomycin resistant enterococci, Listeria monocytogenes,  coagulase negative staphylococci, S. aureus,  methicillin resistant S. aureus, Streptococcus agalactiae  (Group B), S. pneumoniae, S. pyogenes (Group A),  Acinetobacter baumannii, Enterobacter cloacae, E. coli,  Klebsiella oxytoca, K. pneumoniae, Proteus sp.,  Serratia marcescens, Haemophilus influenzae,  Neisseria meningitidis, Pseudomonas aeruginosa, Candida  albicans, C. glabrata, C krusei, C parapsilosis,  C. tropicalis and the KPC resistance gene.    Organism Identification: Blood Culture PCR  Enterococcus faecium (vancomycin resistant)    Method Type: PCR    Method Type: LEONARDO        WBC Count: 4.13 (07-18-20 @ 06:12)  WBC Count: 4.75 (07-17-20 @ 06:59)  WBC Count: 5.04 (07-16-20 @ 07:12)  WBC Count: 4.31 (07-15-20 @ 07:28)  WBC Count: 4.68 (07-14-20 @ 07:05)  WBC Count: 4.58 (07-13-20 @ 05:25)  WBC Count: 5.51 (07-12-20 @ 01:54)  WBC Count: 5.78 (07-11-20 @ 21:24)    < from: Xray Chest 1 View- PORTABLE-Routine (07.17.20 @ 07:03) >    IMPRESSION:  The heart size is unremarkable.    No pneumothorax. Right lower lung linear atelectasis. Degenerative changes of the spine.    < end of copied text >      < from: US Duplex Arterial Lower Ext Ltd, Left (07.13.20 @ 16:25) >    IMPRESSION:     There is a 9 cm mildly complex, anechoic fluid collection that likely represents a seroma or lymphocele.    < end of copied text >    < from: CT Chest w/ IV Cont (07.11.20 @ 16:57) >    EXAM:  CT CHEST IC                          EXAM:  CT ABDOMEN AND PELVIS IC                            PROCEDURE DATE:  07/11/2020            INTERPRETATION:  CLINICAL INFORMATION: VRE bacteremia    COMPARISON: CT abdomen pelvis 4/25/2020 and chestCT dated 2/11/2020..    PROCEDURE:   CT of the Chest, Abdomen and Pelvis was performed with intravenous contrast.   Intravenous contrast: 90 ml Omnipaque 350. 10 ml discarded.  Oral contrast: None.  Sagittal and coronal reformats were performed.    FINDINGS:  CHEST:   LUNGS AND LARGE AIRWAYS: Patent central airways. Bibasilar compressive atelectasis. Evaluation of the lung parenchyma is somewhat limited secondary to motion artifact.  PLEURA: Small bilateral pleural effusions, right greater than left.  VESSELS: Atherosclerotic changes of the aorta and coronary arteries.  HEART: Heart size is enlarged. No pericardial effusion. Aortic valvular and mitral annular calcifications.  MEDIASTINUM AND RONNIE: No lymphadenopathy.  CHEST WALL AND LOWER NECK: Within normal limits.    ABDOMEN AND PELVIS:  HEPATOBILIARY: Status post liver transplant. Small perihepatic fluid. T-tube in place. No biliary ductal dilatation.  SPLEEN: Within normal limits.  PANCREAS: Within normal limits.  ADRENALS: Within normal limits.  KIDNEYS/URETERS: Nonobstructing bilateral renal calculi measuring up to 5 mm in the left kidney. No hydronephrosis.    BLADDER: Distended.  REPRODUCTIVE ORGANS: Prostate gland is within normal limits.    BOWEL: No bowel obstruction.   PERITONEUM: Small ascites. Surgical drain terminating in the right upper quadrant.  VESSELS: Atherosclerotic changes. Upper abdominal varices are not significantly changed.  RETROPERITONEUM/LYMPH NODES: No lymphadenopathy.    ABDOMINAL WALL: Postsurgicalchanges. Left groin partially imaged fluid collection with overlying skin staples.. Diffuse subcutaneous edema.  BONES: Degenerative changes.     IMPRESSION:   Small bilateral pleural effusions, right greater than left, with associated compressive atelectasis.    Status post liver transplant.     Small volume ascites.    Varices again noted.    Nonobstructing intrarenal calculi.    Partially imaged fluid collection in the left groin with overlying skin staples.      LYUBOV VO M.D., RADIOLOGY RESIDENT  This document has been electronically signed.  BHUMI BROWER M.D., ATTENDING RADIOLOGIST  This document has been electronically signed. Jul 12 2020  9:56AM        < end of copied text >    < from: Limited Transthoracic Echo (07.15.20 @ 16:58) >  Conclusions:  Limited study to evaluate for endocarditis. Technically  difficult study with limited/suboptimal views.  1. Mitral annular calcification, otherwise normal mitral  valve. Minimal mitral regurgitation.  2. Aortic valve not well visualized; appears calcified.  3. Endocardium not well visualized; grossly normal left  ventricular systolic function.  4. The right ventricle is not well visualized.  5. Tricuspid valve not well visualized.  6. Pulmonic valve not well visualized.  7. No obvious mobile echodensities or vegetations are seen  on the mitral valve. The pulmonic, aortic, and tricuspid  valves is not well visualized. Consider DUSTIN if clinically  indicated.    < end of copied text >

## 2020-07-18 NOTE — PROGRESS NOTE ADULT - ASSESSMENT
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    [] POD 16 s/p OLT   - Good graft function, LFTs stable  - Liver doppler (7/12) with patent vessels  - Immuno: FK 2mg bid (decrease from 3 BID), MMF increase to 750mg bid; Pred 5mg daily  - PPX: valcyte/nystatin/bactrim  - Continue ASA 81mg daily  - LE edema: Hold Lasix today   - h/o UGI bleed: Protonix 40mg daily   - Cont Mag 800mg BID, keep Mag >2  - Regular, carbohydrate restricted diet  - Pain control PRN  - pysch recs appreciated   - DVT PPx: SCDs at all times    [] VRE bacteremia (7/10)  - ID following: on linezolid; dc'ed meropenem overnight  - Bld cx from 7/12 with NG, L groin collection aspirated (7/13) -NG    [] Hyperglycemia  - Appreciate endocrinology recommendations  - Cont Lantus and Humalog insulin wih SSI coverage  - Fingersticks ac and qhs    [] Heel ulcer  Continue local wound care w/ santyl and DSD as per podiatry    [] Dispo:   - continue 1:1

## 2020-07-18 NOTE — PROGRESS NOTE ADULT - ASSESSMENT
#PENDING RECS. PLEASE WAIT FOR FINAL RECS AFTER DISCUSSION WITH ATTENDING#    ASSESSMENT:  65 y/o M PMHx decompensated JEAN Cirrhosis on transplant list, DMII, HFpEF, recent admission for ESBL E coli prostatic abscess 2/2 4 wks ertapenem, hx of ESBL UTIs with prostate abscess s/p IV abx, recent VRE urinary colonization came to hospital for worsening jaundice and episode of confusion, resolved PTA. s/p treatment course for possible VRE UTI. Now s/p OLT on 7/2/20. Course complicated by Encephalopathy and Tremors. Blood Cultures (7/10) with VRE sensitive to Linezolid. CT C/A/P without obvious intraabdominal source. Hypoxia prior to positive BCx which may be related to sepsis CT Chest (7/11) without evidence of pneumonia. Had L groin fluid collection which appears to be consistent with seroma - sample obtained for culture also negative. No drainage, erythema or underlying fluctuance was appreciated at the right heel. MRI prior to transplant without obvious infection. Transferred from [facility] to [facility] on 7/17/2020.    #VRE Bacteremia, L Groin Collection, Encephalopathy  - Abd US showed 9cm Left groin collection: likely represents a seroma or lymphocele; aspirated - cx: PMN seen, no organism seen  - Continue Linezolid 600 mg IV Q12H, monitor CBC  - Continue monitoring tacrolimus levels  - TTE negative for endocarditis    #Hypoxic Respiratory Failure, Encephalopathy  - No evidence of pneumonia and collection in left groin appears consistent with seroma  - now off the  Meropenem    #s/p OLT   - Continue bactrim, valcyte ([facility] D+/R-) for prophylaxis   - Monitor patient for tremors - monitor tacrolimus dose     #Right heel wound  --Podiatry on board    --Local wound care

## 2020-07-18 NOTE — PROGRESS NOTE ADULT - SUBJECTIVE AND OBJECTIVE BOX
INTERVAL HPI/OVERNIGHT EVENTS:    events noted  transferred to the floor         MEDICATIONS  (STANDING):  aspirin enteric coated 81 milliGRAM(s) Oral daily  chlorhexidine 2% Cloths 1 Application(s) Topical <User Schedule>  collagenase Ointment 1 Application(s) Topical daily  furosemide    Tablet 80 milliGRAM(s) Oral every 8 hours  insulin glargine Injectable (LANTUS) 16 Unit(s) SubCutaneous at bedtime  insulin lispro (HumaLOG) corrective regimen sliding scale   SubCutaneous three times a day before meals  insulin lispro (HumaLOG) corrective regimen sliding scale   SubCutaneous at bedtime  insulin lispro Injectable (HumaLOG) 10 Unit(s) SubCutaneous before dinner  insulin lispro Injectable (HumaLOG) 8 Unit(s) SubCutaneous before breakfast  insulin lispro Injectable (HumaLOG) 8 Unit(s) SubCutaneous before lunch  linezolid  IVPB 600 milliGRAM(s) IV Intermittent every 12 hours  magnesium oxide 800 milliGRAM(s) Oral two times a day with meals  meropenem  IVPB 1000 milliGRAM(s) IV Intermittent every 8 hours  nystatin    Suspension [id number] Unit(s) Swish and Swallow four times a day  nystatin Cream 1 Application(s) Topical two times a day  pantoprazole  Injectable 40 milliGRAM(s) IV Push every 12 hours  polyethylene glycol 3350 17 Gram(s) Oral daily  predniSONE   Tablet 5 milliGRAM(s) Oral daily  senna 2 Tablet(s) Oral at bedtime  tacrolimus 3 milliGRAM(s) Oral <User Schedule>  tacrolimus 2 milliGRAM(s) Oral <User Schedule>  tamsulosin 0.8 milliGRAM(s) Oral at bedtime  trimethoprim   80 mG/sulfamethoxazole 400 mG 1 Tablet(s) Oral daily  valGANciclovir 450 milliGRAM(s) Oral daily    MEDICATIONS  (PRN):  glucagon  Injectable 1 milliGRAM(s) IntraMuscular once PRN Glucose <70 milliGRAM(s)/deciLiter      Allergies    codeine (Anaphylaxis)    Intolerances        Review of Systems:    General:  No wt loss, fevers, chills, night sweats,fatigue,   Eyes:  Good vision, no reported pain  ENT:  No sore throat, pain, runny nose, dysphagia  CV:  No pain, palpitatioins, hypo/hypertension  Resp:  No dyspnea, cough, tachypnea, wheezing  GI:  No pain, No nausea, No vomiting, No diarrhea, No constipatiion, No weight loss, No fever, No pruritis, No rectal bleeding, No tarry stools, No dysphagia,  :  No pain, bleeding, incontinence, nocturia  Muscle:  No pain, weakness  Neuro:  No weakness, tingling, memory problems  Psych:  No fatigue, insomnia, mood problems, depression  Endocrine:  No polyuria, polydypsia, cold/heat intolerance  Heme:  No petechiae, ecchymosis, easy bruisability  Skin:  No rash, tattoos, scars, edema      Vital Signs Last 24 Hrs  T(C): 36.3 (12 Jul 2020 15:00), Max: 36.8 (11 Jul 2020 19:00)  T(F): 97.3 (12 Jul 2020 15:00), Max: 98.2 (11 Jul 2020 19:00)  HR: 80 (12 Jul 2020 15:00) (72 - 83)  BP: 109/59 (12 Jul 2020 15:00) (94/55 - 154/68)  BP(mean): 78 (12 Jul 2020 15:00) (70 - 125)  RR: 15 (12 Jul 2020 15:00) (9 - 28)  SpO2: 96% (12 Jul 2020 15:00) (96% - 100%)    PHYSICAL EXAM:    Constitutional: NAD  HEENT: sclera anicteric   Neck: No LAD, supple  Gastrointestinal: BS+, soft, ND, +incisional tenderness, incisions c/d/i  Extremities: +b/l peripheral edema  Neurological: awake, alert, +tremors - improving     LABS:                        9.3    5.51  )-----------( 98       ( 12 Jul 2020 01:54 )             28.6     07-12    131<L>  |  95<L>  |  31<H>  ----------------------------<  217<H>  5.0   |  27  |  1.26    Ca    8.1<L>      12 Jul 2020 12:22  Phos  4.0     07-12  Mg     2.0     07-12    TPro  4.4<L>  /  Alb  2.6<L>  /  TBili  1.1  /  DBili  0.5<H>  /  AST  64<H>  /  ALT  98<H>  /  AlkPhos  212<H>  07-12    PT/INR - ( 12 Jul 2020 01:54 )   PT: 12.7 sec;   INR: 1.12 ratio         PTT - ( 12 Jul 2020 01:54 )  PTT:28.7 sec      RADIOLOGY & ADDITIONAL TESTS:

## 2020-07-18 NOTE — PROGRESS NOTE ADULT - ASSESSMENT
64 M hx of DM, HLD, GERD, HFpEF with mild LV diastolic dysfunction, and decompensated JEAN cirrhosis c/b ascites with hx of SBP, HE, duodenal ulcer, GAVE and duodenal AVM s/p APC (last on 10/11/19), UNOS listed for liver transplantation at [facility]. 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 He is now re-admitted with hepatic encephalopathy and VRE UTI, also with MISA on CKD. S/p liver transplant 7/2.    Impression:  #S/p liver transplant 7/2: liver enzymes continue to downtrend  OR details:  - L groin cutdown for vv bypass   - anastomosis: bicaval end-end/CBD duct-duct/HA & PV end-end, all standard anatomy.    - IOC with good flow  - Simulect induction. 500mg Solumedrol  - JPs x3 with T-Tube  - 14U pRBC, 14U FFP, 10 PLT, 11 CRYO, 500mL returned from cell saver, EBL 5L, UOP 1100mL  Recipient Info:   ABO: A  CMV:  Neg  EBV Positive  Donor:  Donor ID:  [id number] Match: [id number]  Age: 29 ABO:  A1 High Risk:   No COD: Cardiovascular  Anti CMV positive, EBV IgG- positive  HepBcAb-neg, Hepatitis C-DANA- neg, Hepatitis C ab-neg    # Elevated liver enzymes: Resolved with mildly elevated ALP. Possible secondary to DILI vs sepsis related. Has VRE - currently on linezolid and s/p meropenem.  # VRE on culture from 7/10: Repeat culture 7/12 negative. On linezolid, s/p meropenem. CT showing groin hematoma, s/p aspiration showing likely seroma and low c/f infection per ID.   #Tremors: improved significantly but ongoing symptoms c/f tacrolimus toxicity. May need medication change based on clinical course as per Transplant Surgery.  #Halluciniations/agitation: Improving - likely secondary to sleep wake cycle disturbances vs. ICU delerium. On Seroquel, mirtazepine and melatonin at night. Transferred out of 8ICU overnight.   #GI bleed - Hgb stable. No overt bleeding. Pretransplant with acute blood loss anemia s/p 10U PRBCs, bleeding from known GAVE, Hb stable post-transplant, but reported melena 7/5 AM. GAVE and PHG should have improved w/ liver transplant, most likely etiology is NG tube trauma prior if any c/f GI bleed  # MISA: serum Cr mildly worsening to 1.52 today, however making urine and voiding without issue. Possibly secondary to overdiuresis vs. tacrolimus toxicity.  Volume status improved.  # Abnormal Chest X-Ray c/f evolving PNA vs atelectasis. No signs of active infection  # Hypertension on nifedipine: BP normal range.   # Lower ext edema: Improved with Lasix. Diuretics discontinued for MISA.   #R posterior heel wound w/ overlying eschar –Local wound care. no signs of infections, XR neg for gas, evaluated by podiatry and ID. MRI w/o contrast limited, but no overt signs of active infection.  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   # Hyperglycemia with episodes of overcorrection    Recommendation:  - meropenem/linezolid per transplant ID; plan to d/c meropenem per ID/surgical team  - physical therapy daily to get OOB and ambulating  - hold diuretics and albumin today  - continue with standing magnesium  - monitor renal function  - prednisone 5 mg po daily  - Cellcept 500mg BID  - continue with tacro dosing per transplant surgery  - consider changing tacro target to 7-8 once cellcept dose increased to full dose pending clinical status given mild elevation in Cr  - will consider switch to CsA given c/f neurotoxicity from tacro; however this is improving  - remron, melatonin per psych; reorient as needed  - consider downgrade to regular floor to avoid ICU delerium  - glycemic control   - c/w nystatin, Bactrim, valcyte ppx  - trend Hb- PO PPI BID, monitor bowel movements  - aspirin per surgery  - diet as tolerated  - wound care/nursing for R heel ulcer  - monitor I/Os now with bob removed  - agitation precautions  - f/u transplant surgery recs  - rest of care per SICU team  - transplant hepatology will follow      Vivian Guardado PGY-5  Gastroenterology Fellow  Pager #[phone]  E-mail [email or web address]  Call [phone] to speak to answering service for on-call GI fellow 5pm-7am and weekends. 64 M hx of DM, HLD, GERD, HFpEF with mild LV diastolic dysfunction, and decompensated JEAN cirrhosis c/b ascites with hx of SBP, HE, duodenal ulcer, GAVE and duodenal AVM s/p APC (last on 10/11/19), UNOS listed for liver transplantation at [facility]. 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 He is now re-admitted with hepatic encephalopathy and VRE UTI, also with MISA on CKD. S/p liver transplant 7/2.    Impression:  #S/p liver transplant 7/2: liver enzymes continue to downtrend  OR details:  - L groin cutdown for vv bypass   - anastomosis: bicaval end-end/CBD duct-duct/HA & PV end-end, all standard anatomy.    - IOC with good flow  - Simulect induction. 500mg Solumedrol  - JPs x3 with T-Tube  - 14U pRBC, 14U FFP, 10 PLT, 11 CRYO, 500mL returned from cell saver, EBL 5L, UOP 1100mL  Recipient Info:   ABO: A  CMV:  Neg  EBV Positive  Donor:  Donor ID:  [id number] Match: [id number]  Age: 29 ABO:  A1 High Risk:   No COD: Cardiovascular  Anti CMV positive, EBV IgG- positive  HepBcAb-neg, Hepatitis C-DANA- neg, Hepatitis C ab-neg    # Elevated liver enzymes: Resolved with mildly elevated ALP. Possible secondary to DILI vs sepsis related. Has VRE - currently on linezolid and s/p meropenem.  # VRE on culture from 7/10: Repeat culture 7/12 negative. On linezolid, s/p meropenem. CT showing groin hematoma, s/p aspiration showing likely seroma and low c/f infection per ID.   #Tremors: improved significantly but ongoing symptoms c/f tacrolimus toxicity. May need medication change based on clinical course as per Transplant Surgery.  #Halluciniations/agitation: Improving - likely secondary to sleep wake cycle disturbances vs. ICU delerium. On Seroquel, mirtazepine and melatonin at night. Transferred out of 8ICU overnight.   #GI bleed - Hgb stable. No overt bleeding. Pretransplant with acute blood loss anemia s/p 10U PRBCs, bleeding from known GAVE, Hb stable post-transplant, but reported melena 7/5 AM. GAVE and PHG should have improved w/ liver transplant, most likely etiology is NG tube trauma prior if any c/f GI bleed  # MISA: serum Cr mildly worsening to 1.52 today, however making urine and voiding without issue. Possibly secondary to overdiuresis vs. tacrolimus toxicity vs. side effect of meropenem.  Volume status improved.  # Abnormal Chest X-Ray c/f evolving PNA vs atelectasis. No signs of active infection  # Hypertension on nifedipine: BP normal range.   # Lower ext edema: Improved with Lasix. Diuretics discontinued for MISA.   #R posterior heel wound w/ overlying eschar –Local wound care. no signs of infections, XR neg for gas, evaluated by podiatry and ID. MRI w/o contrast limited, but no overt signs of active infection.  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   # Hyperglycemia with episodes of overcorrection    Recommendation:  - linezolid for VRE per transplant ID  - continue to hold diuretics today for worsening MISA  - continue prednisone 5 mg PO daily  - continue tacrolimus - dosing per transplant surgery  - continue Cellcept - dosing as per transplant surgery  - continue Remeron, melatonin per Psych; reorientation techniques as needed for delerium   - continue nystatin, Bactrim and Valcyte ppx  - physical therapy daily to get OOB and ambulating  - continue with mag oxide 200 mg BID  - monitor Hb on daily CBC - continue PO PPI BID, monitor bowel movements  - follow up Wound care recommendations for R heel ulcer  - follow up other Transplant surgery recommendations       Vivian Guardado PGY-5  Gastroenterology Fellow  Pager #[phone]  E-mail [email or web address]  Call [phone] to speak to answering service for on-call GI fellow 5pm-7am and weekends.

## 2020-07-18 NOTE — PROGRESS NOTE ADULT - ATTENDING COMMENTS
Mary Brower M.D. ,   Pager [phone]     after 5PM/ weekends [phone]    ID service will be covering over the weekend. Please call for acute issues or questions. [phone]

## 2020-07-18 NOTE — PROGRESS NOTE ADULT - SUBJECTIVE AND OBJECTIVE BOX
Transplant Surgery - Multidisciplinary Rounds  --------------------------------------------------------------  OLTx      Date:  7/2/2020       POD#16    Present: Patient seen with multidisciplinary team including Transplant Surgeon: Dr. Gray, Transplant Nephrologist Dr. Salvador, surgery resident OLY Quezada during am rounds and examined with Dr. Gray. Disciplines not in attendance will be notified of the plan.     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     Re-admitted on 6/11:   ·	worsening HE  ·	UTI/VRE: tx with linezolid 6/15-22, bactrim DS 6/22 - 7/2  ·	MISA/Hyponatremia  ·	UGI bleed (melena) s/p EGD (6/22) c/w hemorrhagic duodenitis/gastritis; Grade 2 EV; requiring multiple transfusions  ·	Known h/o R foot ulcer (MRI neg for OM)  ·	s/p OLTx on 7/2/2020, post op liver doppler with patent vessels  ·	post op course c/b delirium and VRE bacteremia (7/10)     Interval events:  - POD 16 s/p OLT with good graft function;  LFTs stable  - AOx3 this AM; however continuing to have waxing and waning mental status   - Remeron dose decreased to 15mg   - Tolerating reg diet; having bowel function   - Pain well controlled   - OOB in chair      Potential Discharge date: pending clinical improvement     Education:  Medications    Plan of care:  See Below    MEDICATIONS  (STANDING):  aspirin enteric coated 81 milliGRAM(s) Oral daily  chlorhexidine 2% Cloths 1 Application(s) Topical <User Schedule>  collagenase Ointment 1 Application(s) Topical daily  insulin glargine Injectable (LANTUS) 10 Unit(s) SubCutaneous at bedtime  insulin lispro (HumaLOG) corrective regimen sliding scale   SubCutaneous three times a day before meals  insulin lispro (HumaLOG) corrective regimen sliding scale   SubCutaneous at bedtime  insulin lispro Injectable (HumaLOG) 7 Unit(s) SubCutaneous three times a day with meals  linezolid  IVPB 600 milliGRAM(s) IV Intermittent every 12 hours  magnesium oxide 800 milliGRAM(s) Oral two times a day with meals  melatonin 5 milliGRAM(s) Oral at bedtime  mirtazapine 15 milliGRAM(s) Oral at bedtime  mycophenolate mofetil 500 milliGRAM(s) Oral <User Schedule>  nystatin    Suspension 153744 Unit(s) Swish and Swallow four times a day  nystatin Cream 1 Application(s) Topical two times a day  pantoprazole    Tablet 40 milliGRAM(s) Oral before breakfast  predniSONE   Tablet 5 milliGRAM(s) Oral daily  QUEtiapine 25 milliGRAM(s) Oral <User Schedule>  tacrolimus 3 milliGRAM(s) Oral <User Schedule>  tamsulosin 0.8 milliGRAM(s) Oral at bedtime  trimethoprim   80 mG/sulfamethoxazole 400 mG 1 Tablet(s) Oral daily  ursodiol Capsule 300 milliGRAM(s) Oral two times a day  valGANciclovir 900 milliGRAM(s) Oral daily    MEDICATIONS  (PRN):      PAST MEDICAL & SURGICAL HISTORY:  GIB (gastrointestinal bleeding)  GERD with esophagitis: Gastritis &amp; Non Bleeding Ulcers  Hepatic encephalopathy  Obesity  Fatty liver disease, nonalcoholic  Renal stones: 25 years ago  Hypertension  Neuropathy  Hypercholesteremia  Diabetes  S/P cholecystectomy      Vital Signs Last 24 Hrs  T(C): 36.5 (18 Jul 2020 08:46), Max: 36.8 (17 Jul 2020 14:00)  T(F): 97.7 (18 Jul 2020 08:46), Max: 98.3 (17 Jul 2020 14:00)  HR: 784 (18 Jul 2020 09:07) (78 - 784)  BP: 136/63 (18 Jul 2020 08:46) (120/68 - 137/65)  BP(mean): 90 (18 Jul 2020 05:00) (90 - 90)  RR: 19 (18 Jul 2020 08:46) (18 - 20)  SpO2: 97% (18 Jul 2020 09:07) (97% - 100%)    I&O's Summary    17 Jul 2020 07:01  -  18 Jul 2020 07:00  --------------------------------------------------------  IN: 1020 mL / OUT: 1750 mL / NET: -730 mL                              9.0    4.13  )-----------( 125      ( 18 Jul 2020 06:12 )             27.5     07-18    135  |  101  |  34<H>  ----------------------------<  145<H>  5.1   |  27  |  1.52<H>    Ca    9.2      18 Jul 2020 06:12  Phos  3.1     07-18  Mg     1.8     07-18    TPro  5.2<L>  /  Alb  2.9<L>  /  TBili  0.9  /  DBili  0.4<H>  /  AST  15  /  ALT  30  /  AlkPhos  132<H>  07-18    Tacrolimus (), Serum: 8.4 ng/mL (07-17 @ 08:47)      Review of systems  Gen: weakness  Skin: No rashes  Head/Eyes/Ears/Mouth: No headache; Normal hearing; Normal vision w/o blurriness; No sinus pain/discomfort, sore throat  Respiratory: No dyspnea, cough, wheezing, hemoptysis  CV: No chest pain, PND, orthopnea  GI: Mild abdominal pain at surgical incision site; denies diarrhea, constipation, nausea, vomiting, melena, hematochezia  : No increased frequency, dysuria, hematuria, nocturia  MSK: No joint pain/swelling; no back pain; no edema  Neuro: No dizziness/lightheadedness, weakness, seizures, numbness, tingling  Heme: No easy bruising or bleeding  Endo: No heat/cold intolerance  Psych: No significant nervousness, anxiety, stress, depression  All other systems were reviewed and are negative, except as noted.      PHYSICAL EXAM:  Constitutional: NAD, alert and oriented  Eyes: Anicteric, PERRLA  ENMT: nc/at  Neck: no central line, no JVD  Respiratory: CTA B/L, unlabored breathing   Cardiovascular: RRR  Gastrointestinal: soft, NT, ND, incision c/d/i, T-tube tied,   Genitourinary: Voiding spontaneously  Extremities: heel wound without drainage or purulent material, edema improving   Vascular: Palpable dp pulses bilaterally  Neurological: A&O x3  Skin: no rashes  Musculoskeletal: Moving all extremities  Psychiatric: Responsive

## 2020-07-19 DIAGNOSIS — D89.9 DISORDER INVOLVING THE IMMUNE MECHANISM, UNSPECIFIED: ICD-10-CM

## 2020-07-19 DIAGNOSIS — Z94.4 LIVER TRANSPLANT STATUS: ICD-10-CM

## 2020-07-19 LAB
ALBUMIN SERPL ELPH-MCNC: 3.2 G/DL — LOW (ref 3.3–5)
ALP SERPL-CCNC: 121 U/L — HIGH (ref 40–120)
ALT FLD-CCNC: 26 U/L — SIGNIFICANT CHANGE UP (ref 10–45)
AMMONIA BLD-MCNC: 20 UMOL/L — SIGNIFICANT CHANGE UP (ref 11–55)
ANION GAP SERPL CALC-SCNC: 11 MMOL/L — SIGNIFICANT CHANGE UP (ref 5–17)
ANION GAP SERPL CALC-SCNC: 8 MMOL/L — SIGNIFICANT CHANGE UP (ref 5–17)
ANION GAP SERPL CALC-SCNC: 9 MMOL/L — SIGNIFICANT CHANGE UP (ref 5–17)
APTT BLD: 29.4 SEC — SIGNIFICANT CHANGE UP (ref 27.5–35.5)
AST SERPL-CCNC: 16 U/L — SIGNIFICANT CHANGE UP (ref 10–40)
BILIRUB DIRECT SERPL-MCNC: 0.4 MG/DL — HIGH (ref 0–0.2)
BILIRUB INDIRECT FLD-MCNC: 0.5 MG/DL — SIGNIFICANT CHANGE UP (ref 0.2–1)
BILIRUB SERPL-MCNC: 0.9 MG/DL — SIGNIFICANT CHANGE UP (ref 0.2–1.2)
BUN SERPL-MCNC: 35 MG/DL — HIGH (ref 7–23)
BUN SERPL-MCNC: 37 MG/DL — HIGH (ref 7–23)
BUN SERPL-MCNC: 37 MG/DL — HIGH (ref 7–23)
CALCIUM SERPL-MCNC: 8.9 MG/DL — SIGNIFICANT CHANGE UP (ref 8.4–10.5)
CALCIUM SERPL-MCNC: 9 MG/DL — SIGNIFICANT CHANGE UP (ref 8.4–10.5)
CALCIUM SERPL-MCNC: 9.2 MG/DL — SIGNIFICANT CHANGE UP (ref 8.4–10.5)
CHLORIDE SERPL-SCNC: 100 MMOL/L — SIGNIFICANT CHANGE UP (ref 96–108)
CHLORIDE SERPL-SCNC: 101 MMOL/L — SIGNIFICANT CHANGE UP (ref 96–108)
CHLORIDE SERPL-SCNC: 102 MMOL/L — SIGNIFICANT CHANGE UP (ref 96–108)
CO2 SERPL-SCNC: 23 MMOL/L — SIGNIFICANT CHANGE UP (ref 22–31)
CO2 SERPL-SCNC: 25 MMOL/L — SIGNIFICANT CHANGE UP (ref 22–31)
CO2 SERPL-SCNC: 27 MMOL/L — SIGNIFICANT CHANGE UP (ref 22–31)
CREAT SERPL-MCNC: 1.65 MG/DL — HIGH (ref 0.5–1.3)
CREAT SERPL-MCNC: 1.82 MG/DL — HIGH (ref 0.5–1.3)
CREAT SERPL-MCNC: 2.35 MG/DL — HIGH (ref 0.5–1.3)
GLUCOSE BLDC GLUCOMTR-MCNC: 101 MG/DL — HIGH (ref 70–99)
GLUCOSE BLDC GLUCOMTR-MCNC: 135 MG/DL — HIGH (ref 70–99)
GLUCOSE BLDC GLUCOMTR-MCNC: 157 MG/DL — HIGH (ref 70–99)
GLUCOSE BLDC GLUCOMTR-MCNC: 69 MG/DL — LOW (ref 70–99)
GLUCOSE BLDC GLUCOMTR-MCNC: 70 MG/DL — SIGNIFICANT CHANGE UP (ref 70–99)
GLUCOSE BLDC GLUCOMTR-MCNC: 76 MG/DL — SIGNIFICANT CHANGE UP (ref 70–99)
GLUCOSE BLDC GLUCOMTR-MCNC: 92 MG/DL — SIGNIFICANT CHANGE UP (ref 70–99)
GLUCOSE SERPL-MCNC: 101 MG/DL — HIGH (ref 70–99)
GLUCOSE SERPL-MCNC: 135 MG/DL — HIGH (ref 70–99)
GLUCOSE SERPL-MCNC: 92 MG/DL — SIGNIFICANT CHANGE UP (ref 70–99)
HCT VFR BLD CALC: 28.4 % — LOW (ref 39–50)
HGB BLD-MCNC: 9.3 G/DL — LOW (ref 13–17)
INR BLD: 1.04 RATIO — SIGNIFICANT CHANGE UP (ref 0.88–1.16)
MAGNESIUM SERPL-MCNC: 1.6 MG/DL — SIGNIFICANT CHANGE UP (ref 1.6–2.6)
MCHC RBC-ENTMCNC: 32.4 PG — SIGNIFICANT CHANGE UP (ref 27–34)
MCHC RBC-ENTMCNC: 32.7 GM/DL — SIGNIFICANT CHANGE UP (ref 32–36)
MCV RBC AUTO: 99 FL — SIGNIFICANT CHANGE UP (ref 80–100)
NRBC # BLD: 0 /100 WBCS — SIGNIFICANT CHANGE UP (ref 0–0)
PHOSPHATE SERPL-MCNC: 3.4 MG/DL — SIGNIFICANT CHANGE UP (ref 2.5–4.5)
PLATELET # BLD AUTO: 126 K/UL — LOW (ref 150–400)
POTASSIUM SERPL-MCNC: 5.4 MMOL/L — HIGH (ref 3.5–5.3)
POTASSIUM SERPL-MCNC: 5.6 MMOL/L — HIGH (ref 3.5–5.3)
POTASSIUM SERPL-MCNC: 5.8 MMOL/L — HIGH (ref 3.5–5.3)
POTASSIUM SERPL-SCNC: 5.4 MMOL/L — HIGH (ref 3.5–5.3)
POTASSIUM SERPL-SCNC: 5.6 MMOL/L — HIGH (ref 3.5–5.3)
POTASSIUM SERPL-SCNC: 5.8 MMOL/L — HIGH (ref 3.5–5.3)
PROCALCITONIN SERPL-MCNC: 0.48 NG/ML — HIGH (ref 0.02–0.1)
PROT SERPL-MCNC: 5.4 G/DL — LOW (ref 6–8.3)
PROTHROM AB SERPL-ACNC: 12.3 SEC — SIGNIFICANT CHANGE UP (ref 10.6–13.6)
RBC # BLD: 2.87 M/UL — LOW (ref 4.2–5.8)
RBC # FLD: 19 % — HIGH (ref 10.3–14.5)
SODIUM SERPL-SCNC: 134 MMOL/L — LOW (ref 135–145)
SODIUM SERPL-SCNC: 135 MMOL/L — SIGNIFICANT CHANGE UP (ref 135–145)
SODIUM SERPL-SCNC: 137 MMOL/L — SIGNIFICANT CHANGE UP (ref 135–145)
TACROLIMUS SERPL-MCNC: 7.6 NG/ML — SIGNIFICANT CHANGE UP
WBC # BLD: 4.06 K/UL — SIGNIFICANT CHANGE UP (ref 3.8–10.5)
WBC # FLD AUTO: 4.06 K/UL — SIGNIFICANT CHANGE UP (ref 3.8–10.5)

## 2020-07-19 PROCEDURE — 99232 SBSQ HOSP IP/OBS MODERATE 35: CPT | Mod: GC

## 2020-07-19 PROCEDURE — 99232 SBSQ HOSP IP/OBS MODERATE 35: CPT | Mod: GC,24

## 2020-07-19 PROCEDURE — 99233 SBSQ HOSP IP/OBS HIGH 50: CPT

## 2020-07-19 RX ORDER — FUROSEMIDE 40 MG
40 TABLET ORAL ONCE
Refills: 0 | Status: COMPLETED | OUTPATIENT
Start: 2020-07-19 | End: 2020-07-19

## 2020-07-19 RX ORDER — SODIUM CHLORIDE 9 MG/ML
500 INJECTION INTRAMUSCULAR; INTRAVENOUS; SUBCUTANEOUS ONCE
Refills: 0 | Status: COMPLETED | OUTPATIENT
Start: 2020-07-19 | End: 2020-07-19

## 2020-07-19 RX ORDER — INSULIN LISPRO 100/ML
3 VIAL (ML) SUBCUTANEOUS
Refills: 0 | Status: DISCONTINUED | OUTPATIENT
Start: 2020-07-19 | End: 2020-07-19

## 2020-07-19 RX ORDER — INSULIN LISPRO 100/ML
VIAL (ML) SUBCUTANEOUS
Refills: 0 | Status: DISCONTINUED | OUTPATIENT
Start: 2020-07-19 | End: 2020-07-27

## 2020-07-19 RX ORDER — INSULIN LISPRO 100/ML
7 VIAL (ML) SUBCUTANEOUS
Refills: 0 | Status: DISCONTINUED | OUTPATIENT
Start: 2020-07-19 | End: 2020-07-19

## 2020-07-19 RX ORDER — INSULIN LISPRO 100/ML
4 VIAL (ML) SUBCUTANEOUS
Refills: 0 | Status: DISCONTINUED | OUTPATIENT
Start: 2020-07-19 | End: 2020-07-22

## 2020-07-19 RX ADMIN — SODIUM CHLORIDE 1000 MILLILITER(S): 9 INJECTION INTRAMUSCULAR; INTRAVENOUS; SUBCUTANEOUS at 09:05

## 2020-07-19 RX ADMIN — TAMSULOSIN HYDROCHLORIDE 0.8 MILLIGRAM(S): 0.4 CAPSULE ORAL at 22:16

## 2020-07-19 RX ADMIN — MYCOPHENOLATE MOFETIL 750 MILLIGRAM(S): 250 CAPSULE ORAL at 19:53

## 2020-07-19 RX ADMIN — Medication 40 MILLIGRAM(S): at 07:39

## 2020-07-19 RX ADMIN — Medication 100000 UNIT(S): at 12:58

## 2020-07-19 RX ADMIN — Medication 81 MILLIGRAM(S): at 12:59

## 2020-07-19 RX ADMIN — CHLORHEXIDINE GLUCONATE 1 APPLICATION(S): 213 SOLUTION TOPICAL at 08:30

## 2020-07-19 RX ADMIN — Medication 1 APPLICATION(S): at 12:58

## 2020-07-19 RX ADMIN — URSODIOL 300 MILLIGRAM(S): 250 TABLET, FILM COATED ORAL at 17:10

## 2020-07-19 RX ADMIN — TACROLIMUS 2 MILLIGRAM(S): 5 CAPSULE ORAL at 07:41

## 2020-07-19 RX ADMIN — Medication 4 UNIT(S): at 17:15

## 2020-07-19 RX ADMIN — VALGANCICLOVIR 900 MILLIGRAM(S): 450 TABLET, FILM COATED ORAL at 12:58

## 2020-07-19 RX ADMIN — INSULIN GLARGINE 10 UNIT(S): 100 INJECTION, SOLUTION SUBCUTANEOUS at 22:18

## 2020-07-19 RX ADMIN — Medication 5 MILLIGRAM(S): at 22:16

## 2020-07-19 RX ADMIN — Medication 1: at 17:16

## 2020-07-19 RX ADMIN — MYCOPHENOLATE MOFETIL 750 MILLIGRAM(S): 250 CAPSULE ORAL at 07:43

## 2020-07-19 RX ADMIN — QUETIAPINE FUMARATE 25 MILLIGRAM(S): 200 TABLET, FILM COATED ORAL at 17:15

## 2020-07-19 RX ADMIN — MIRTAZAPINE 15 MILLIGRAM(S): 45 TABLET, ORALLY DISINTEGRATING ORAL at 22:16

## 2020-07-19 RX ADMIN — NYSTATIN CREAM 1 APPLICATION(S): 100000 CREAM TOPICAL at 05:15

## 2020-07-19 RX ADMIN — Medication 100000 UNIT(S): at 05:15

## 2020-07-19 RX ADMIN — URSODIOL 300 MILLIGRAM(S): 250 TABLET, FILM COATED ORAL at 05:15

## 2020-07-19 RX ADMIN — Medication 100000 UNIT(S): at 17:11

## 2020-07-19 RX ADMIN — SODIUM CHLORIDE 1000 MILLILITER(S): 9 INJECTION INTRAMUSCULAR; INTRAVENOUS; SUBCUTANEOUS at 18:20

## 2020-07-19 RX ADMIN — PANTOPRAZOLE SODIUM 40 MILLIGRAM(S): 20 TABLET, DELAYED RELEASE ORAL at 05:14

## 2020-07-19 RX ADMIN — Medication 7 UNIT(S): at 09:02

## 2020-07-19 RX ADMIN — Medication 40 MILLIGRAM(S): at 17:30

## 2020-07-19 RX ADMIN — Medication 1 TABLET(S): at 12:58

## 2020-07-19 RX ADMIN — NYSTATIN CREAM 1 APPLICATION(S): 100000 CREAM TOPICAL at 17:10

## 2020-07-19 RX ADMIN — Medication 100000 UNIT(S): at 22:18

## 2020-07-19 RX ADMIN — OLANZAPINE 2.5 MILLIGRAM(S): 15 TABLET, FILM COATED ORAL at 22:16

## 2020-07-19 NOTE — PROGRESS NOTE ADULT - SUBJECTIVE AND OBJECTIVE BOX
Chief Complaint:  Patient is a 64y old  Male who presents with a chief complaint of Liver failure, hepatic encephalopathy (2020 09:36)      Interval Events:     Reassessed by Behavioral Health - olanzapine PRN added for uncontrolled delerium. Floor staff report patient very restless and did not sleep well overnight. He is acting out and became confrontation with nursing staff.     Allergies:  codeine (Anaphylaxis)      Hospital Medications:  aspirin enteric coated 81 milliGRAM(s) Oral daily  chlorhexidine 2% Cloths 1 Application(s) Topical <User Schedule>  collagenase Ointment 1 Application(s) Topical daily  insulin glargine Injectable (LANTUS) 10 Unit(s) SubCutaneous at bedtime  insulin lispro (HumaLOG) corrective regimen sliding scale   SubCutaneous three times a day before meals  insulin lispro (HumaLOG) corrective regimen sliding scale   SubCutaneous at bedtime  insulin lispro Injectable (HumaLOG) 7 Unit(s) SubCutaneous three times a day with meals  linezolid  IVPB 600 milliGRAM(s) IV Intermittent every 12 hours  magnesium oxide 800 milliGRAM(s) Oral two times a day with meals  melatonin 5 milliGRAM(s) Oral at bedtime  mirtazapine 15 milliGRAM(s) Oral at bedtime  mycophenolate mofetil 750 milliGRAM(s) Oral <User Schedule>  nystatin    Suspension [id number] Unit(s) Swish and Swallow four times a day  nystatin Cream 1 Application(s) Topical two times a day  OLANZapine 2.5 milliGRAM(s) Oral every 6 hours PRN  OLANZapine Injectable 1.25 milliGRAM(s) IntraMuscular every 6 hours PRN  pantoprazole    Tablet 40 milliGRAM(s) Oral before breakfast  predniSONE   Tablet 5 milliGRAM(s) Oral daily  QUEtiapine 25 milliGRAM(s) Oral <User Schedule>  tacrolimus 2 milliGRAM(s) Oral <User Schedule>  tamsulosin 0.8 milliGRAM(s) Oral at bedtime  trimethoprim   80 mG/sulfamethoxazole 400 mG 1 Tablet(s) Oral daily  ursodiol Capsule 300 milliGRAM(s) Oral two times a day  valGANciclovir 900 milliGRAM(s) Oral daily      PMHX/PSHX:  GIB (gastrointestinal bleeding)  GERD with esophagitis  Hepatic encephalopathy  Obesity  Fatty liver disease, nonalcoholic  Renal stones  Hypertension  Neuropathy  Hypercholesteremia  Diabetes  S/P cholecystectomy  No significant past surgical history      Family history:  Family history of type 2 diabetes mellitus  Family history of hypertension  Family history of stomach cancer  No pertinent family history in first degree relatives      ROS:     General:  No weight loss, fevers, chills, night sweats, fatigue   Eyes:  No vision changes  ENT:  No sore throat, pain, runny nose  CV:  No chest pain, palpitations, dizziness   Resp:  No SOB, cough, wheezing  GI:  See HPI  :  No burning with urination, hematuria  Muscle:  No pain, weakness  Neuro:  No weakness/tingling, memory problems  Psych:  No fatigue, insomnia, mood problems, depression  Heme:  No easy bruisability  Skin:  No rash, edema      PHYSICAL EXAM:     GENERAL:  Restless in bed, on BiPAP, does not answer questions inappropriately   HEENT:  Conjunctivae clear, sclera anicteric  CHEST:  No increased effort w/ respirations +BiPAP   HEART:  Regular rhythm & rate  ABDOMEN:  Soft, non-tender, non-distended +surgical incision remains intact +dressing over prior ANDREINA drain site C/D/I   EXTREMITIES:  no LE edema  SKIN:  No rash/erythema/ecchymoses/petechiae/wounds/jaundice  NEURO:  Alert, orientedx2 (person, place) +B/L UE fasisculations vs. tremor       Vital Signs:  Vital Signs Last 24 Hrs  T(C): 36.4 (2020 09:00), Max: 36.7 (2020 21:00)  T(F): 97.5 (2020 09:00), Max: 98.1 (2020 21:00)  HR: 82 (2020 09:00) (80 - 93)  BP: 130/67 (2020 09:00) (109/61 - 132/63)  BP(mean): --  RR: 18 (2020 09:00) (16 - 18)  SpO2: 100% (2020 09:00) (98% - 100%)  Daily     Daily Weight in k.1 (2020 05:00)    LABS:                        9.3    4.06  )-----------( 126      ( 2020 05:52 )             28.4     07-19    137  |  102  |  35<H>  ----------------------------<  101<H>  5.6<H>   |  27  |  1.65<H>    Ca    9.0      2020 10:27  Phos  3.4     07-  Mg     1.6     07-    TPro  5.4<L>  /  Alb  3.2<L>  /  TBili  0.9  /  DBili  0.4<H>  /  AST  16  /  ALT  26  /  AlkPhos  121<H>  -    LIVER FUNCTIONS - ( 2020 05:52 )  Alb: 3.2 g/dL / Pro: 5.4 g/dL / ALK PHOS: 121 U/L / ALT: 26 U/L / AST: 16 U/L / GGT: x           PT/INR - ( 2020 05:52 )   PT: 12.3 sec;   INR: 1.04 ratio    PTT - ( 2020 05:52 )  PTT:29.4 sec    Amylase Serum--      Lipase serum--       [id number]      Imaging:

## 2020-07-19 NOTE — PROGRESS NOTE ADULT - PROBLEM SELECTOR PLAN 2
B/P goal 130/80 off anti-hypertensives at this time. BP at goal <130/80, off anti-hypertensives at this time.

## 2020-07-19 NOTE — PROGRESS NOTE ADULT - PROBLEM SELECTOR PLAN 1
-test BG AC/HS  -c/w Lantus 10 units QHS  -Noted with hypoglycemia to 69 this noon, agree with decreasing Humalog to 4 units sq ac TID (hold if NPO)  -change to Humalog low correction scale AC and low HS scale  -monitor FS ac and hs  Plan discussed with team.   DISPO: basal/bolus, with doses TBD  -Pt can resume f/u with Dr. Mathur or   [facility] [address] [place] [address] [address] [place] [address] [phone].

## 2020-07-19 NOTE — PROGRESS NOTE ADULT - ATTENDING COMMENTS
Mental status slightly improved.   Excellent graft function  HyperK w/ elevation in Cr --> tx w fluids/lasix.  Will lower tac level.  Immuno: tac (will switch to Neoral), MMF, pred.

## 2020-07-19 NOTE — PROGRESS NOTE ADULT - ASSESSMENT
65 yo M with hx of T2DM uncontrolled due to steroids x 10 years with neuropathy and R DFU of the heel, HTN, HLD, MGUS, and decompensated JEAN cirrhosis s/p liver x-plant on 7/2/2020 admitted 6/11/2020 with confusion secondary to liver failure and encephalopathy. Endocrine consulted for glycemic control in the setting of steroid use now on Prednisone 5mg daily. BG goal (100-180mg/dl).

## 2020-07-19 NOTE — PROGRESS NOTE ADULT - ASSESSMENT
64 M hx of DM, HLD, GERD, HFpEF with mild LV diastolic dysfunction, and decompensated JEAN cirrhosis c/b ascites with hx of SBP, HE, duodenal ulcer, GAVE and duodenal AVM s/p APC (last on 10/11/19), UNOS listed for liver transplantation at [facility]. 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 S/p liver transplant 7/2 transferred from SICU to floor on 7/17/20 now with signs of tacrolimus toxicity and significant hospital delerium.     Impression:  #S/p liver transplant 7/2: liver enzymes continue to downtrend  OR details:  - L groin cutdown for vv bypass   - anastomosis: bicaval end-end/CBD duct-duct/HA & PV end-end, all standard anatomy.    - IOC with good flow  - Simulect induction. 500mg Solumedrol  - JPs x3 with T-Tube  - 14U pRBC, 14U FFP, 10 PLT, 11 CRYO, 500mL returned from cell saver, EBL 5L, UOP 1100mL  Recipient Info:   ABO: A  CMV:  Neg  EBV Positive  Donor:  Donor ID:  [id number] Match: [id number]  Age: 29 ABO:  A1 High Risk:   No COD: Cardiovascular  Anti CMV positive, EBV IgG- positive  HepBcAb-neg, Hepatitis C-DANA- neg, Hepatitis C ab-neg    # Halluciniations/agitation: Improving - likely secondary to sleep wake cycle disturbances vs. ICU delerium vs. less likely hepatic encephalopathy. On Seroquel, mirtazepine and melatonin at night. Olanzapine added PRN per Behavioral Health 7/18/20.  # Tremors: Involving bilateral UE - concern for tacrolimus toxicity requiring dose adjustments per Transplant Surgery.   # MISA: serum Cr mildly stable, however UOP remains normal. Suspect MISA due to overdiuresis vs. tacrolimus toxicity vs. side effect of meropenem.      # Elevated liver enzymes: Resolved with mildly elevated ALK-P. Possible secondary to DILI vs. sepsis induced from VRE bacteremia.   # VRE on culture from 7/10: Repeat culture 7/12 negative. On linezolid, s/p meropenem. CT showing groin hematoma, s/p aspiration showing likely seroma and low c/f infection per ID.   #GI bleed: Resolved. Hgb stable with no overt bleeding post-transplant. 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 GAVE and PHG likely improved posttransplant.  # Abnormal Chest X-Ray c/f evolving PNA vs atelectasis. No signs of active infection  # Hypertension on nifedipine: BP normal range.   # Lower ext edema: Improved with Lasix. Diuretics discontinued for MISA.   #R posterior heel wound w/ overlying eschar –Local wound care. no signs of infections, XR neg for gas, evaluated by podiatry and ID. MRI w/o contrast limited, but no overt signs of active infection.  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   # Hyperglycemia with episodes of overcorrection    Recommendation:  - medical management of delerium as per Behavioral health and Transplant surgery team - please also continue reorientation techniques (open blinds to maximize exposure to daylight, provide any corrective lenses,  etc.)  - continue to hold diuretics today for MISA  - continue prednisone 5 mg PO daily  - continue tacrolimus (currently on 2 mg BID) - dosing per transplant surgery  - continue Cellcept (currently on 750 mg BID) - dosing as per transplant surgery  - linezolid for VE bacteremia as per Transplant ID  - continue nystatin, Bactrim and Valcyte ppx  - physical therapy daily to get OOB and ambulating  - continue with mag oxide 200 mg BID  - monitor Hb on daily CBC - continue PO PPI BID, monitor bowel movements  - follow up Wound care recommendations for R heel ulcer  - follow up other Transplant surgery recommendations       Vivian Guardado PGY-5  Gastroenterology Fellow  Pager #[phone]  E-mail [email or web address]  Call [phone] to speak to answering service for on-call GI fellow 5pm-7am and weekends.

## 2020-07-19 NOTE — PROGRESS NOTE ADULT - SUBJECTIVE AND OBJECTIVE BOX
Transplant Surgery - Multidisciplinary Rounds  --------------------------------------------------------------  [facility]      Date:  7/2/2020       POD#17    Present: Patient seen with multidisciplinary team including Transplant Surgeon: Dr. Gray, Transplant Nephrologist Dr. Salvador, surgery resident Pilar, NP: Patrick during am rounds and examined with Dr. Gray. Disciplines not in attendance will be notified of the plan.     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     Re-admitted on 6/11:   ·	worsening HE  ·	UTI/VRE: tx with linezolid 6/15-22, bactrim DS 6/22 - 7/2  ·	MISA/Hyponatremia  ·	UGI bleed (melena) s/p EGD (6/22) c/w hemorrhagic duodenitis/gastritis; Grade 2 EV; requiring multiple transfusions  ·	Known h/o R foot ulcer (MRI neg for OM)  ·	s/p OLTx on 7/2/2020, post op liver doppler with patent vessels  ·	post op course c/b delirium and VRE bacteremia (7/10)     Interval events:  - POD 17 s/p OLT with good graft function;  LFTs stable  - Awake restless, continuing to have waxing and waning mental status   - Tacrolimus decreased to 2/2 from 3/3 and MMF increased to 750mg BID  - S/P one episode of extreme agitation psych added zyprexa PRN  - Tolerating reg diet; having bowel function   - Pain well controlled   - OOB in chair      Potential Discharge date: pending clinical improvement     Education:  Medications    Plan of care:  See Below       MEDICATIONS  (STANDING):  aspirin enteric coated 81 milliGRAM(s) Oral daily  chlorhexidine 2% Cloths 1 Application(s) Topical <User Schedule>  collagenase Ointment 1 Application(s) Topical daily  insulin glargine Injectable (LANTUS) 10 Unit(s) SubCutaneous at bedtime  insulin lispro (HumaLOG) corrective regimen sliding scale   SubCutaneous three times a day before meals  insulin lispro (HumaLOG) corrective regimen sliding scale   SubCutaneous at bedtime  insulin lispro Injectable (HumaLOG) 7 Unit(s) SubCutaneous three times a day with meals  linezolid  IVPB 600 milliGRAM(s) IV Intermittent every 12 hours  magnesium oxide 800 milliGRAM(s) Oral two times a day with meals  melatonin 5 milliGRAM(s) Oral at bedtime  mirtazapine 15 milliGRAM(s) Oral at bedtime  mycophenolate mofetil 750 milliGRAM(s) Oral <User Schedule>  nystatin    Suspension 415432 Unit(s) Swish and Swallow four times a day  nystatin Cream 1 Application(s) Topical two times a day  pantoprazole    Tablet 40 milliGRAM(s) Oral before breakfast  predniSONE   Tablet 5 milliGRAM(s) Oral daily  QUEtiapine 25 milliGRAM(s) Oral <User Schedule>  sodium chloride 0.9% Bolus 500 milliLiter(s) IV Bolus once  tacrolimus 2 milliGRAM(s) Oral <User Schedule>  tamsulosin 0.8 milliGRAM(s) Oral at bedtime  trimethoprim   80 mG/sulfamethoxazole 400 mG 1 Tablet(s) Oral daily  ursodiol Capsule 300 milliGRAM(s) Oral two times a day  valGANciclovir 900 milliGRAM(s) Oral daily    MEDICATIONS  (PRN):  OLANZapine 2.5 milliGRAM(s) Oral every 6 hours PRN Agitation/Psychosis  OLANZapine Injectable 1.25 milliGRAM(s) IntraMuscular every 6 hours PRN Severe agitation if not able to take PO      PAST MEDICAL & SURGICAL HISTORY:  GIB (gastrointestinal bleeding)  GERD with esophagitis: Gastritis &amp; Non Bleeding Ulcers  Hepatic encephalopathy  Obesity  Fatty liver disease, nonalcoholic  Renal stones: 25 years ago  Hypertension  Neuropathy  Hypercholesteremia  Diabetes  S/P cholecystectomy      Vital Signs Last 24 Hrs  T(C): 36.4 (19 Jul 2020 09:00), Max: 36.7 (18 Jul 2020 21:00)  T(F): 97.5 (19 Jul 2020 09:00), Max: 98.1 (18 Jul 2020 21:00)  HR: 82 (19 Jul 2020 09:00) (80 - 93)  BP: 130/67 (19 Jul 2020 09:00) (109/61 - 132/63)  BP(mean): --  RR: 18 (19 Jul 2020 09:00) (16 - 18)  SpO2: 100% (19 Jul 2020 09:00) (98% - 100%)    I&O's Summary    18 Jul 2020 07:01  -  19 Jul 2020 07:00  --------------------------------------------------------  IN: 1920 mL / OUT: 2225 mL / NET: -305 mL    19 Jul 2020 07:01  -  19 Jul 2020 09:17  --------------------------------------------------------  IN: 0 mL / OUT: 1125 mL / NET: -1125 mL                              9.3    4.06  )-----------( 126      ( 19 Jul 2020 05:52 )             28.4     07-19    135  |  101  |  37<H>  ----------------------------<  135<H>  5.8<H>   |  25  |  1.82<H>    Ca    8.9      19 Jul 2020 05:52  Phos  3.4     07-19  Mg     1.6     07-19    TPro  5.4<L>  /  Alb  3.2<L>  /  TBili  0.9  /  DBili  0.4<H>  /  AST  16  /  ALT  26  /  AlkPhos  121<H>  07-19    Tacrolimus (), Serum: 6.3 ng/mL (07-18 @ 09:35)        Culture - Body Fluid with Gram Stain (collected 07-14-20 @ 00:32)  Source: .Body Fluid Abdominal Fluid  Gram Stain (07-14-20 @ 04:12):    polymorphonuclear leukocytes seen    No organisms seen    by cytocentrifuge  Final Report (07-18-20 @ 16:41):    No growth at 5 days    Culture - Blood (collected 07-12-20 @ 10:04)  Source: .Blood Blood-Peripheral  Final Report (07-17-20 @ 11:01):    No Growth Final    Culture - Blood (collected 07-12-20 @ 10:04)  Source: .Blood Blood-Peripheral  Final Report (07-17-20 @ 11:01):    No Growth Final      Review of systems  Gen: weakness  Skin: No rashes  Head/Eyes/Ears/Mouth: No headache; Normal hearing; Normal vision w/o blurriness; No sinus pain/discomfort, sore throat  Respiratory: No dyspnea, cough, wheezing, hemoptysis  CV: No chest pain, PND, orthopnea  GI: Mild abdominal pain at surgical incision site; denies diarrhea, constipation, nausea, vomiting, melena, hematochezia  : No increased frequency, dysuria, hematuria, nocturia  MSK: No joint pain/swelling; no back pain; no edema, RT groin incision with staples intact   Neuro: No dizziness/lightheadedness, weakness, seizures, numbness, tingling  Heme: No easy bruising or bleeding  Endo: No heat/cold intolerance  Psych: No significant nervousness, anxiety, stress, depression  All other systems were reviewed and are negative, except as noted.      PHYSICAL EXAM:  Constitutional: Restless and confused at times  Eyes: Anicteric, PERRLA  ENMT: nc/at  Neck: no central line, no JVD  Respiratory: CTA B/L, unlabored breathing   Cardiovascular: RRR  Gastrointestinal: soft, NT, ND, incision c/d/i, T-tube tied,   Genitourinary: Voiding spontaneously  Extremities: heel wound without drainage or purulent material, edema improving   Vascular: Palpable dp pulses bilaterally  Neurological: Confused with easily reorientation  Skin: no rashes  Musculoskeletal: Moving all extremities  Psychiatric: Responsive

## 2020-07-19 NOTE — PROGRESS NOTE ADULT - SUBJECTIVE AND OBJECTIVE BOX
TRANSPLANT NEPHROLOGY CONSULT- FOLLOW UP  --------------------------------------------------------------------------------  Chief Complaint: Elevated creatinine. Previously consulted for hyponatremia pre liver transplant    24 hour events/subjective:    Reviewed. Patient has functioning liver transplant,.Patient had OLTx on 7/2/2020,post op course c/b delirium and VRE bacteremia (7/10). Currently creatinine increasing with elevated K. Patient has been voiding and post void bladder volume today was 85 ml.  Details of admission and prior clinical data as noted below:  64M with IDDM, HLD, obesity, HFpEF with mild LV diastolic dysfunction, MGUS, chronic anemia with a history of duodenal ulcer as well as GAVE and duodenal AVM s/p APC (last on 10/11/19), 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  UTI/VRE: tx with linezolid 6/15-22, bactrim DS 6/22 - 7/2; UGI bleed (melena) s/p EGD (6/22) c/w hemorrhagic duodenitis/gastritis; Grade 2 EV; requiring multiple transfusions; Known h/o R foot ulcer (MRI neg for OM). Recent changes in medications: Tacrolimus decreased to 2/2 from 3/3 on 7/18 and MMF increased to 750mg BID. Patient had one episode of extreme agitation psych added zyprexa PRN    PAST HISTORY  --------------------------------------------------------------------------------  GIB (gastrointestinal bleeding)  GERD with esophagitis: Gastritis &amp; Non Bleeding Ulcers  Hepatic encephalopathy  Obesity  Fatty liver disease, nonalcoholic  Renal stones: 25 years ago  Hypertension  Neuropathy  Hypercholesteremia  Diabetes  S/P cholecystectomyIntolerances        ALLERGIES & MEDICATIONS  --------------------------------------------------------------------------------  Allergies    codeine (Anaphylaxis)      Standing Inpatient Medications  aspirin enteric coated 81 milliGRAM(s) Oral daily  chlorhexidine 2% Cloths 1 Application(s) Topical <User Schedule>  collagenase Ointment 1 Application(s) Topical daily  insulin glargine Injectable (LANTUS) 10 Unit(s) SubCutaneous at bedtime  insulin lispro (HumaLOG) corrective regimen sliding scale   SubCutaneous three times a day before meals  insulin lispro (HumaLOG) corrective regimen sliding scale   SubCutaneous at bedtime  insulin lispro Injectable (HumaLOG) 7 Unit(s) SubCutaneous three times a day with meals  linezolid  IVPB 600 milliGRAM(s) IV Intermittent every 12 hours  magnesium oxide 800 milliGRAM(s) Oral two times a day with meals  melatonin 5 milliGRAM(s) Oral at bedtime  mirtazapine 15 milliGRAM(s) Oral at bedtime  mycophenolate mofetil 750 milliGRAM(s) Oral <User Schedule>  nystatin    Suspension [id number] Unit(s) Swish and Swallow four times a day  nystatin Cream 1 Application(s) Topical two times a day  pantoprazole    Tablet 40 milliGRAM(s) Oral before breakfast  predniSONE   Tablet 5 milliGRAM(s) Oral daily  QUEtiapine 25 milliGRAM(s) Oral <User Schedule>  tacrolimus 2 milliGRAM(s) Oral <User Schedule>  tamsulosin 0.8 milliGRAM(s) Oral at bedtime  trimethoprim   80 mG/sulfamethoxazole 400 mG 1 Tablet(s) Oral daily  ursodiol Capsule 300 milliGRAM(s) Oral two times a day  valGANciclovir 900 milliGRAM(s) Oral daily    PRN Inpatient Medications  OLANZapine 2.5 milliGRAM(s) Oral every 6 hours PRN  OLANZapine Injectable 1.25 milliGRAM(s) IntraMuscular every 6 hours PRN      REVIEW OF SYSTEMS  --------------------------------------------------------------------------------  Gen: fatigue, No fevers/chills   Respiratory: No dyspnea, cough,   CV: No chest pain, orthopnea  GI: No abdominal pain, diarrhea, constipation, nausea, vomiting  : No increased frequency, dysuria, hematuria, nocturia  MSK: No joint pain/swelling; no back pain; no edema  Neuro: No dizziness/lightheadedness, weakness, seizures, numbness, tingling  Psych: No significant nervousness, anxiety, stress, depression    All other systems were reviewed and are negative, except as noted.    VITALS/PHYSICAL EXAM  --------------------------------------------------------------------------------  T(C): 36.4 (07-19-20 @ 09:00), Max: 36.7 (07-18-20 @ 21:00)  HR: 82 (07-19-20 @ 09:00) (80 - 93)  BP: 130/67 (07-19-20 @ 09:00) (109/61 - 132/63)  RR: 18 (07-19-20 @ 09:00) (16 - 18)  SpO2: 100% (07-19-20 @ 09:00) (98% - 100%)    07-18-20 @ 07:01  -  07-19-20 @ 07:00  --------------------------------------------------------  IN: 1920 mL / OUT: 2225 mL / NET: -305 mL    07-19-20 @ 07:01  -  07-19-20 @ 09:36  --------------------------------------------------------  IN: 300 mL / OUT: 1325 mL / NET: -1025 mL      Physical Exam:  	Gen:  Resting comfortably. Denies any acute distress  	HEENT: PERRL, supple neck, clear oropharynx  	Pulm: CTA B/L  	CV: RRR, S1S2; no rub  	Abd: +BS, soft, surgical incisions healing.         	: No suprapubic tenderness. No Foleys.  	UE: Warm, FROM, intact strength; no edema; no asterixis. Tremors +  	LE: Warm, FROM, intact strength; trace edema dorsum of feet. Right foot dressing on for heel ulcer  	Neuro: Conscious, alert, oriented to place and person. Speech: normal  	Psych: Normal affect and mood at present. Intermittently restless as reported by nursing team, wanting to get up from bed  	      LABS/STUDIES  --------------------------------------------------------------------------------              9.3    4.06  >-----------<  126      [07-19-20 @ 05:52]              28.4     135  |  101  |  37  ----------------------------<  135      [07-19-20 @ 05:52]  5.8   |  25  |  1.82        Ca     8.9     [07-19-20 @ 05:52]      Mg     1.6     [07-19-20 @ 05:52]      Phos  3.4     [07-19-20 @ 05:52]    TPro  5.4  /  Alb  3.2  /  TBili  0.9  /  DBili  0.4  /  AST  16  /  ALT  26  /  AlkPhos  121  [07-19-20 @ 05:52]    PT/INR: PT 12.3 , INR 1.04       [07-19-20 @ 05:52]  PTT: 29.4       [07-19-20 @ 05:52]      Creatinine Trend:  SCr 1.82 [07-19 @ 05:52]  SCr 1.52 [07-18 @ 06:12]  SCr 1.42 [07-17 @ 06:59]  SCr 1.40 [07-16 @ 07:12]  SCr 1.30 [07-15 @ 07:27]    Tacrolimus (), Serum: 6.3 ng/mL (07-18 @ 09:35)  Tacrolimus (), Serum: 8.4 ng/mL (07-17 @ 08:47)  Tacrolimus (), Serum: 9.3 ng/mL (07-16 @ 08:58)  Tacrolimus (), Serum: 9.7 ng/mL (07-15 @ 08:44)    Urinalysis - [07-10-20 @ 03:23]      Color Light Yellow / Appearance Clear / SG 1.011 / pH 6.5      Gluc Negative / Ketone Negative  / Bili Negative / Urobili Negative       Blood Trace / Protein Negative / Leuk Est Negative / Nitrite Negative      RBC 2 / WBC 1 / Hyaline 0 / Gran  / Sq Epi  / Non Sq Epi 0 / Bacteria 0.0      Iron 160, TIBC Unable to calculate Test Repeated, %sat Unable to calculate Test Repeated      [06-12-20 @ 09:04]  Ferritin 493      [04-29-20 @ 08:20]  PTH -- (Ca 10.3)      [12-13-19 @ 08:11]   12  Vitamin D (25OH) 25.3      [12-13-19 @ 08:11]  HbA1c 6.4      [02-12-20 @ 17:08]  TSH 3.26      [04-26-20 @ 09:47]  Lipid: chol 71, TG 63, HDL 30, LDL 28      [11-27-19 @ 09:11]    < from: US Abdomen Doppler (07.12.20 @ 18:46) >  Status post liver transplant. Elevated velocities seen in the hepatic arteries, most pronounced in main hepatic artery 212 cm/s, slightly increased when compared to prior ultrasound evaluation, 158 cm/sec.  Normal resistive indice and no tardus parvus waveform identified.      Perihepatic fluid collection, likely a seroma. Hematoma is felt less likely.     Patent portal and hepatic veins.     Stable echogenic focus is seen in the IVC which may be related to the surgical anastomosis.     Follow-up is recommended.       < end of copied text >    < from: CT Abdomen and Pelvis w/ IV Cont (07.11.20 @ 16:57) >  KIDNEYS/URETERS: Nonobstructing bilateral renal calculi measuring up to 5 mm in the left kidney. No hydronephrosis.    Small bilateral pleural effusions, right greater than left, with associated compressive atelectasis.    Status post liver transplant.     Small volume ascites.    Varices again noted.    Nonobstructing intrarenal calculi.    Partially imaged fluid collection in the left groin with overlying skin staples.    Culture - Body Fluid with Gram Stain (07.14.20 @ 00:32)    Gram Stain:   polymorphonuclear leukocytes seen  No organisms seen  by cytocentrifuge    Specimen Source: .Body Fluid Abdominal Fluid    Culture Results:   No growth at 5 days    Culture - Blood (07.12.20 @ 10:04)    Specimen Source: .Blood Blood-Peripheral    Culture Results:   No Growth Final    Culture - Urine (07.11.20 @ 03:14)    Specimen Source: .Urine Catheterized    Culture Results:   No growth    Urine Microscopic-Add On (NC) (07.10.20 @ 03:23)    Hyaline Casts: 0 /lpf    Bacteria: 0.0    White Blood Cell - Urine: 1 /HPF    Red Blood Cell - Urine: 2 /hpf    Epithelial Cells: 0 /hpf    Sodium, Random Urine (06.21.20 @ 11:33)    Sodium, Random Urine: <35: Reference Ranges have NOT been established for random urine analytes due  to variability in fluid intake and concentration. mmol/L

## 2020-07-19 NOTE — PROGRESS NOTE ADULT - ATTENDING COMMENTS
Patient with functioning recent liver transplant with MISA, non oliguric with mild hyperkalemia  Etiology of MISA multifactorial contribution- Tacrolimus therapy/Infection/ with Underlying CKD- multiple risk factors  Obesity, JEAN, cirrhosis  DM, Nephrolithiasis- non obstructive, unilateral, Bacteriuria- resolved, Right heel ulcer  Multiple CKD risk factors as noted above  Suggestions: Agree with plan for NS and lasix for managing hyperkalemia  Check urinalysis, urine lytes, Urine microalbumin /creatinine  Renal and bladder ultrasound  Avoid nephrotoxic agents, monitor daily creatinine, electrolytes, Low K diet  Adjust all medications for reduced creatinine clearance.    I was present during and reviewed clinical and lab data as well as assessment and plan as documented . Please contact if any additional questions with any change in clinical condition or on availability of any additional information or reports.  Will follow

## 2020-07-19 NOTE — PROGRESS NOTE ADULT - SUBJECTIVE AND OBJECTIVE BOX
INTERVAL HPI/OVERNIGHT EVENTS:    events noted  sitting in chair   feels well        MEDICATIONS  (STANDING):  aspirin enteric coated 81 milliGRAM(s) Oral daily  chlorhexidine 2% Cloths 1 Application(s) Topical <User Schedule>  collagenase Ointment 1 Application(s) Topical daily  furosemide    Tablet 80 milliGRAM(s) Oral every 8 hours  insulin glargine Injectable (LANTUS) 16 Unit(s) SubCutaneous at bedtime  insulin lispro (HumaLOG) corrective regimen sliding scale   SubCutaneous three times a day before meals  insulin lispro (HumaLOG) corrective regimen sliding scale   SubCutaneous at bedtime  insulin lispro Injectable (HumaLOG) 10 Unit(s) SubCutaneous before dinner  insulin lispro Injectable (HumaLOG) 8 Unit(s) SubCutaneous before breakfast  insulin lispro Injectable (HumaLOG) 8 Unit(s) SubCutaneous before lunch  linezolid  IVPB 600 milliGRAM(s) IV Intermittent every 12 hours  magnesium oxide 800 milliGRAM(s) Oral two times a day with meals  meropenem  IVPB 1000 milliGRAM(s) IV Intermittent every 8 hours  nystatin    Suspension 420620 Unit(s) Swish and Swallow four times a day  nystatin Cream 1 Application(s) Topical two times a day  pantoprazole  Injectable 40 milliGRAM(s) IV Push every 12 hours  polyethylene glycol 3350 17 Gram(s) Oral daily  predniSONE   Tablet 5 milliGRAM(s) Oral daily  senna 2 Tablet(s) Oral at bedtime  tacrolimus 3 milliGRAM(s) Oral <User Schedule>  tacrolimus 2 milliGRAM(s) Oral <User Schedule>  tamsulosin 0.8 milliGRAM(s) Oral at bedtime  trimethoprim   80 mG/sulfamethoxazole 400 mG 1 Tablet(s) Oral daily  valGANciclovir 450 milliGRAM(s) Oral daily    MEDICATIONS  (PRN):  glucagon  Injectable 1 milliGRAM(s) IntraMuscular once PRN Glucose <70 milliGRAM(s)/deciLiter      Allergies    codeine (Anaphylaxis)    Intolerances        Review of Systems:    General:  No wt loss, fevers, chills, night sweats,fatigue,   Eyes:  Good vision, no reported pain  ENT:  No sore throat, pain, runny nose, dysphagia  CV:  No pain, palpitatioins, hypo/hypertension  Resp:  No dyspnea, cough, tachypnea, wheezing  GI:  No pain, No nausea, No vomiting, No diarrhea, No constipatiion, No weight loss, No fever, No pruritis, No rectal bleeding, No tarry stools, No dysphagia,  :  No pain, bleeding, incontinence, nocturia  Muscle:  No pain, weakness  Neuro:  No weakness, tingling, memory problems  Psych:  No fatigue, insomnia, mood problems, depression  Endocrine:  No polyuria, polydypsia, cold/heat intolerance  Heme:  No petechiae, ecchymosis, easy bruisability  Skin:  No rash, tattoos, scars, edema      Vital Signs Last 24 Hrs  T(C): 36.3 (12 Jul 2020 15:00), Max: 36.8 (11 Jul 2020 19:00)  T(F): 97.3 (12 Jul 2020 15:00), Max: 98.2 (11 Jul 2020 19:00)  HR: 80 (12 Jul 2020 15:00) (72 - 83)  BP: 109/59 (12 Jul 2020 15:00) (94/55 - 154/68)  BP(mean): 78 (12 Jul 2020 15:00) (70 - 125)  RR: 15 (12 Jul 2020 15:00) (9 - 28)  SpO2: 96% (12 Jul 2020 15:00) (96% - 100%)    PHYSICAL EXAM:    Constitutional: NAD  HEENT: sclera anicteric   Neck: No LAD, supple  Gastrointestinal: BS+, soft, ND, +incisional tenderness, incisions c/d/i  Extremities: +b/l peripheral edema  Neurological: awake, alert, +tremors - improving     LABS:                        9.3    5.51  )-----------( 98       ( 12 Jul 2020 01:54 )             28.6     07-12    131<L>  |  95<L>  |  31<H>  ----------------------------<  217<H>  5.0   |  27  |  1.26    Ca    8.1<L>      12 Jul 2020 12:22  Phos  4.0     07-12  Mg     2.0     07-12    TPro  4.4<L>  /  Alb  2.6<L>  /  TBili  1.1  /  DBili  0.5<H>  /  AST  64<H>  /  ALT  98<H>  /  AlkPhos  212<H>  07-12    PT/INR - ( 12 Jul 2020 01:54 )   PT: 12.7 sec;   INR: 1.12 ratio         PTT - ( 12 Jul 2020 01:54 )  PTT:28.7 sec      RADIOLOGY & ADDITIONAL TESTS:

## 2020-07-19 NOTE — PROGRESS NOTE ADULT - SUBJECTIVE AND OBJECTIVE BOX
Follow-up on: T2DM    Subjective: Patient seen at bedside, says feeling fine. Noted to have hypoglycemia to 60s this noon, denied any symptoms and says feeling fine currently. Appetite is good, finishing entire meal tray as per PCA at bedside.    ROS:  General: no fever/ chills, good appetite  Resp: no cough, no sob   CVS: no chest pain, no palpitation  GI: no nausea , no vomiting, tolerating oral diet      MEDICATIONS  (STANDING):  aspirin enteric coated 81 milliGRAM(s) Oral daily  chlorhexidine 2% Cloths 1 Application(s) Topical <User Schedule>  collagenase Ointment 1 Application(s) Topical daily  insulin glargine Injectable (LANTUS) 10 Unit(s) SubCutaneous at bedtime  insulin lispro (HumaLOG) corrective regimen sliding scale   SubCutaneous three times a day before meals  insulin lispro (HumaLOG) corrective regimen sliding scale   SubCutaneous at bedtime  insulin lispro Injectable (HumaLOG) 4 Unit(s) SubCutaneous three times a day before meals  linezolid  IVPB 600 milliGRAM(s) IV Intermittent every 12 hours  magnesium oxide 800 milliGRAM(s) Oral two times a day with meals  melatonin 5 milliGRAM(s) Oral at bedtime  mirtazapine 15 milliGRAM(s) Oral at bedtime  mycophenolate mofetil 750 milliGRAM(s) Oral <User Schedule>  nystatin    Suspension 808402 Unit(s) Swish and Swallow four times a day  nystatin Cream 1 Application(s) Topical two times a day  pantoprazole    Tablet 40 milliGRAM(s) Oral before breakfast  predniSONE   Tablet 5 milliGRAM(s) Oral daily  QUEtiapine 25 milliGRAM(s) Oral <User Schedule>  sodium chloride 0.9% Bolus 500 milliLiter(s) IV Bolus once  tacrolimus 2 milliGRAM(s) Oral <User Schedule>  tamsulosin 0.8 milliGRAM(s) Oral at bedtime  trimethoprim   80 mG/sulfamethoxazole 400 mG 1 Tablet(s) Oral daily  ursodiol Capsule 300 milliGRAM(s) Oral two times a day  valGANciclovir 900 milliGRAM(s) Oral daily    MEDICATIONS  (PRN):  OLANZapine 2.5 milliGRAM(s) Oral every 6 hours PRN Agitation/Psychosis  OLANZapine Injectable 1.25 milliGRAM(s) IntraMuscular every 6 hours PRN Severe agitation if not able to take PO      PHYSICAL EXAM:  VITALS: T(C): 36.7 (07-19-20 @ 13:00)  T(F): 98 (07-19-20 @ 13:00), Max: 98.1 (07-18-20 @ 21:00)  HR: 75 (07-19-20 @ 13:00) (75 - 93)  BP: 105/59 (07-19-20 @ 13:00) (105/59 - 132/63)  RR:  (16 - 18)  SpO2:  (98% - 100%)  Wt(kg): --  GENERAL: obese male, not in any acute distress  EYES: No proptosis, no icterus   HEENT:  Atraumatic, Normocephalic  Neuro: AAO x 3    POCT Blood Glucose.: 101 mg/dL (07-19-20 @ 14:26)  POCT Blood Glucose.: 70 mg/dL (07-19-20 @ 12:46)  POCT Blood Glucose.: 69 mg/dL (07-19-20 @ 12:44)  POCT Blood Glucose.: 135 mg/dL (07-19-20 @ 08:21)  POCT Blood Glucose.: 180 mg/dL (07-18-20 @ 21:22)  POCT Blood Glucose.: 191 mg/dL (07-18-20 @ 18:23)  POCT Blood Glucose.: 101 mg/dL (07-18-20 @ 12:02)  POCT Blood Glucose.: 231 mg/dL (07-18-20 @ 08:51)  POCT Blood Glucose.: 191 mg/dL (07-17-20 @ 21:23)  POCT Blood Glucose.: 145 mg/dL (07-17-20 @ 16:52)  POCT Blood Glucose.: 173 mg/dL (07-17-20 @ 11:47)  POCT Blood Glucose.: 161 mg/dL (07-17-20 @ 08:16)  POCT Blood Glucose.: 165 mg/dL (07-16-20 @ 22:25)  POCT Blood Glucose.: 211 mg/dL (07-16-20 @ 17:07)    07-19    137  |  102  |  35<H>  ----------------------------<  101<H>  5.6<H>   |  27  |  1.65<H>    EGFR if : 50<L>  EGFR if non : 43<L>    Ca    9.0      07-19  Mg     1.6     07-19  Phos  3.4     07-19    TPro  5.4<L>  /  Alb  3.2<L>  /  TBili  0.9  /  DBili  0.4<H>  /  AST  16  /  ALT  26  /  AlkPhos  121<H>  07-19    A1C with Estimated Average Glucose Result: 5.2 % (06.14.20 @ 11:00)

## 2020-07-19 NOTE — PROGRESS NOTE ADULT - ASSESSMENT
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    [] POD 16 s/p OLT   - Good graft function, LFTs stable  - Liver doppler (7/12) with patent vessels  - Immuno: FK 2mg bid, MMF 750mg bid; Pred 5mg daily  - PPX: valcyte/nystatin/bactrim  - Continue ASA 81mg daily  - Hyperkalemia (K=5.8) and MISA (Cr 1.82) add one dose of lasix 40mg IV and 500 ML NS bolus  - Repeat BMP @10 am   - h/o UGI bleed: Protonix 40mg daily   - Cont Mag 800mg BID, keep Mag >2  - Change diet to low K- Regular, carbohydrate restricted diet  - Pain control PRN  - pysch recs appreciated   - DVT PPx: SCDs at all times    [] VRE bacteremia (7/10)  - ID following: on linezolid  - Bld cx from 7/12 with NG, L groin collection aspirated (7/13) -NG    [] Hyperglycemia  - Appreciate endocrinology recommendations  - Cont Lantus and Humalog insulin wih SSI coverage  - Fingersticks ac and qhs    [] Heel ulcer  Continue local wound care w/ santyl and DSD as per podiatry    [] Dispo:   - continue 1:1

## 2020-07-20 DIAGNOSIS — E87.5 HYPERKALEMIA: ICD-10-CM

## 2020-07-20 LAB
ALBUMIN SERPL ELPH-MCNC: 3 G/DL — LOW (ref 3.3–5)
ALP SERPL-CCNC: 125 U/L — HIGH (ref 40–120)
ALT FLD-CCNC: 24 U/L — SIGNIFICANT CHANGE UP (ref 10–45)
AMMONIA BLD-MCNC: 36 UMOL/L — SIGNIFICANT CHANGE UP (ref 11–55)
ANION GAP SERPL CALC-SCNC: 11 MMOL/L — SIGNIFICANT CHANGE UP (ref 5–17)
APPEARANCE UR: CLEAR — SIGNIFICANT CHANGE UP
APTT BLD: 29.6 SEC — SIGNIFICANT CHANGE UP (ref 27.5–35.5)
AST SERPL-CCNC: 13 U/L — SIGNIFICANT CHANGE UP (ref 10–40)
BILIRUB DIRECT SERPL-MCNC: 0.4 MG/DL — HIGH (ref 0–0.2)
BILIRUB INDIRECT FLD-MCNC: 0.4 MG/DL — SIGNIFICANT CHANGE UP (ref 0.2–1)
BILIRUB SERPL-MCNC: 0.8 MG/DL — SIGNIFICANT CHANGE UP (ref 0.2–1.2)
BILIRUB UR-MCNC: NEGATIVE — SIGNIFICANT CHANGE UP
BUN SERPL-MCNC: 37 MG/DL — HIGH (ref 7–23)
CALCIUM SERPL-MCNC: 9.1 MG/DL — SIGNIFICANT CHANGE UP (ref 8.4–10.5)
CHLORIDE SERPL-SCNC: 102 MMOL/L — SIGNIFICANT CHANGE UP (ref 96–108)
CHLORIDE UR-SCNC: 54 MMOL/L — SIGNIFICANT CHANGE UP
CO2 SERPL-SCNC: 22 MMOL/L — SIGNIFICANT CHANGE UP (ref 22–31)
COLOR SPEC: COLORLESS — SIGNIFICANT CHANGE UP
CREAT SERPL-MCNC: 2.04 MG/DL — HIGH (ref 0.5–1.3)
DIFF PNL FLD: ABNORMAL
GAS PNL BLDA: SIGNIFICANT CHANGE UP
GLUCOSE BLDC GLUCOMTR-MCNC: 110 MG/DL — HIGH (ref 70–99)
GLUCOSE BLDC GLUCOMTR-MCNC: 133 MG/DL — HIGH (ref 70–99)
GLUCOSE BLDC GLUCOMTR-MCNC: 148 MG/DL — HIGH (ref 70–99)
GLUCOSE BLDC GLUCOMTR-MCNC: 170 MG/DL — HIGH (ref 70–99)
GLUCOSE SERPL-MCNC: 134 MG/DL — HIGH (ref 70–99)
GLUCOSE UR QL: NEGATIVE — SIGNIFICANT CHANGE UP
HCT VFR BLD CALC: 28.5 % — LOW (ref 39–50)
HGB BLD-MCNC: 9.2 G/DL — LOW (ref 13–17)
INR BLD: 0.99 RATIO — SIGNIFICANT CHANGE UP (ref 0.88–1.16)
KETONES UR-MCNC: NEGATIVE — SIGNIFICANT CHANGE UP
LEUKOCYTE ESTERASE UR-ACNC: NEGATIVE — SIGNIFICANT CHANGE UP
MAGNESIUM SERPL-MCNC: 1.6 MG/DL — SIGNIFICANT CHANGE UP (ref 1.6–2.6)
MCHC RBC-ENTMCNC: 32.2 PG — SIGNIFICANT CHANGE UP (ref 27–34)
MCHC RBC-ENTMCNC: 32.3 GM/DL — SIGNIFICANT CHANGE UP (ref 32–36)
MCV RBC AUTO: 99.7 FL — SIGNIFICANT CHANGE UP (ref 80–100)
NITRITE UR-MCNC: NEGATIVE — SIGNIFICANT CHANGE UP
NRBC # BLD: 0 /100 WBCS — SIGNIFICANT CHANGE UP (ref 0–0)
PH UR: 6.5 — SIGNIFICANT CHANGE UP (ref 5–8)
PHOSPHATE SERPL-MCNC: 4 MG/DL — SIGNIFICANT CHANGE UP (ref 2.5–4.5)
PLATELET # BLD AUTO: 106 K/UL — LOW (ref 150–400)
POTASSIUM SERPL-MCNC: 5.4 MMOL/L — HIGH (ref 3.5–5.3)
POTASSIUM SERPL-SCNC: 5.4 MMOL/L — HIGH (ref 3.5–5.3)
POTASSIUM UR-SCNC: 17 MMOL/L — SIGNIFICANT CHANGE UP
PROCALCITONIN SERPL-MCNC: 0.43 NG/ML — HIGH (ref 0.02–0.1)
PROT SERPL-MCNC: 5.2 G/DL — LOW (ref 6–8.3)
PROT UR-MCNC: NEGATIVE — SIGNIFICANT CHANGE UP
PROTHROM AB SERPL-ACNC: 11.8 SEC — SIGNIFICANT CHANGE UP (ref 10.6–13.6)
RBC # BLD: 2.86 M/UL — LOW (ref 4.2–5.8)
RBC # FLD: 18.9 % — HIGH (ref 10.3–14.5)
SODIUM SERPL-SCNC: 135 MMOL/L — SIGNIFICANT CHANGE UP (ref 135–145)
SODIUM UR-SCNC: 90 MMOL/L — SIGNIFICANT CHANGE UP
SP GR SPEC: 1.01 — LOW (ref 1.01–1.02)
TACROLIMUS SERPL-MCNC: 5.5 NG/ML — SIGNIFICANT CHANGE UP
UROBILINOGEN FLD QL: NEGATIVE — SIGNIFICANT CHANGE UP
WBC # BLD: 3.66 K/UL — LOW (ref 3.8–10.5)
WBC # FLD AUTO: 3.66 K/UL — LOW (ref 3.8–10.5)

## 2020-07-20 PROCEDURE — 99233 SBSQ HOSP IP/OBS HIGH 50: CPT

## 2020-07-20 PROCEDURE — 99232 SBSQ HOSP IP/OBS MODERATE 35: CPT | Mod: GC

## 2020-07-20 PROCEDURE — 99232 SBSQ HOSP IP/OBS MODERATE 35: CPT

## 2020-07-20 PROCEDURE — 99232 SBSQ HOSP IP/OBS MODERATE 35: CPT | Mod: GC,24

## 2020-07-20 RX ORDER — LANOLIN ALCOHOL/MO/W.PET/CERES
3 CREAM (GRAM) TOPICAL AT BEDTIME
Refills: 0 | Status: DISCONTINUED | OUTPATIENT
Start: 2020-07-20 | End: 2020-07-24

## 2020-07-20 RX ORDER — CYCLOSPORINE 100 MG/1
100 CAPSULE ORAL
Refills: 0 | Status: DISCONTINUED | OUTPATIENT
Start: 2020-07-20 | End: 2020-07-21

## 2020-07-20 RX ORDER — CYCLOSPORINE 100 MG/1
100 CAPSULE ORAL ONCE
Refills: 0 | Status: COMPLETED | OUTPATIENT
Start: 2020-07-20 | End: 2020-07-20

## 2020-07-20 RX ORDER — MAGNESIUM SULFATE 500 MG/ML
2 VIAL (ML) INJECTION ONCE
Refills: 0 | Status: COMPLETED | OUTPATIENT
Start: 2020-07-20 | End: 2020-07-20

## 2020-07-20 RX ADMIN — URSODIOL 300 MILLIGRAM(S): 250 TABLET, FILM COATED ORAL at 05:22

## 2020-07-20 RX ADMIN — CYCLOSPORINE 100 MILLIGRAM(S): 100 CAPSULE ORAL at 10:20

## 2020-07-20 RX ADMIN — Medication 100000 UNIT(S): at 05:21

## 2020-07-20 RX ADMIN — Medication 1 APPLICATION(S): at 11:21

## 2020-07-20 RX ADMIN — Medication 3 MILLIGRAM(S): at 21:48

## 2020-07-20 RX ADMIN — VALGANCICLOVIR 900 MILLIGRAM(S): 450 TABLET, FILM COATED ORAL at 11:23

## 2020-07-20 RX ADMIN — Medication 100000 UNIT(S): at 17:37

## 2020-07-20 RX ADMIN — Medication 1 TABLET(S): at 11:21

## 2020-07-20 RX ADMIN — CYCLOSPORINE 100 MILLIGRAM(S): 100 CAPSULE ORAL at 20:00

## 2020-07-20 RX ADMIN — Medication 81 MILLIGRAM(S): at 11:22

## 2020-07-20 RX ADMIN — NYSTATIN CREAM 1 APPLICATION(S): 100000 CREAM TOPICAL at 05:21

## 2020-07-20 RX ADMIN — INSULIN GLARGINE 10 UNIT(S): 100 INJECTION, SOLUTION SUBCUTANEOUS at 21:47

## 2020-07-20 RX ADMIN — Medication 100000 UNIT(S): at 11:22

## 2020-07-20 RX ADMIN — Medication 4 UNIT(S): at 13:06

## 2020-07-20 RX ADMIN — NYSTATIN CREAM 1 APPLICATION(S): 100000 CREAM TOPICAL at 17:37

## 2020-07-20 RX ADMIN — Medication 4 UNIT(S): at 08:37

## 2020-07-20 RX ADMIN — Medication 4 UNIT(S): at 17:53

## 2020-07-20 RX ADMIN — MYCOPHENOLATE MOFETIL 750 MILLIGRAM(S): 250 CAPSULE ORAL at 08:05

## 2020-07-20 RX ADMIN — CHLORHEXIDINE GLUCONATE 1 APPLICATION(S): 213 SOLUTION TOPICAL at 08:06

## 2020-07-20 RX ADMIN — TAMSULOSIN HYDROCHLORIDE 0.8 MILLIGRAM(S): 0.4 CAPSULE ORAL at 21:23

## 2020-07-20 RX ADMIN — MYCOPHENOLATE MOFETIL 750 MILLIGRAM(S): 250 CAPSULE ORAL at 20:54

## 2020-07-20 RX ADMIN — Medication 50 GRAM(S): at 09:13

## 2020-07-20 RX ADMIN — PANTOPRAZOLE SODIUM 40 MILLIGRAM(S): 20 TABLET, DELAYED RELEASE ORAL at 05:21

## 2020-07-20 NOTE — PROGRESS NOTE ADULT - SUBJECTIVE AND OBJECTIVE BOX
[facility] DIVISION OF KIDNEY DISEASES AND HYPERTENSION -- FOLLOW UP NOTE  --------------------------------------------------------------------------------  64-year-old male with IDDM, HLD, obesity, HFpEF, MGUS, decompensated JEAN/Cirrhosis s/p liver transplant on 7/2/20. Transplant Nephrology team consulted for elevated Scr. Scr on 7/3/20 was 1.12. Scr remained WNL, was 1.30 on 7/15/20. Pt. noted to have relative hypotension on the morning of 7/16 and 7/17 with systolic BP fluctuating from 150s to 90s, Scr increased to 1.40 on 7/16/20. Scr peaked at 2.35 yesterday, today has improved to 2.04 today.     Pt. evaluated at bedside, in no acute distress. Appears in no acute distress. Pt. agitated overnight again as per RN.    PAST HISTORY  --------------------------------------------------------------------------------  No significant changes to PMH, PSH, FHx, SHx, unless otherwise noted    ALLERGIES & MEDICATIONS  --------------------------------------------------------------------------------  Allergies    codeine (Anaphylaxis)    Intolerances    Standing Inpatient Medications  aspirin enteric coated 81 milliGRAM(s) Oral daily  chlorhexidine 2% Cloths 1 Application(s) Topical <User Schedule>  collagenase Ointment 1 Application(s) Topical daily  cycloSPORINE  , modified (GENGRAF) 100 milliGRAM(s) Oral <User Schedule>  insulin glargine Injectable (LANTUS) 10 Unit(s) SubCutaneous at bedtime  insulin lispro (HumaLOG) corrective regimen sliding scale   SubCutaneous three times a day before meals  insulin lispro (HumaLOG) corrective regimen sliding scale   SubCutaneous at bedtime  insulin lispro Injectable (HumaLOG) 4 Unit(s) SubCutaneous three times a day before meals  linezolid  IVPB 600 milliGRAM(s) IV Intermittent every 12 hours  magnesium oxide 800 milliGRAM(s) Oral two times a day with meals  melatonin 5 milliGRAM(s) Oral at bedtime  mycophenolate mofetil 750 milliGRAM(s) Oral <User Schedule>  nystatin    Suspension 821286 Unit(s) Swish and Swallow four times a day  nystatin Cream 1 Application(s) Topical two times a day  pantoprazole    Tablet 40 milliGRAM(s) Oral before breakfast  predniSONE   Tablet 5 milliGRAM(s) Oral daily  QUEtiapine 25 milliGRAM(s) Oral <User Schedule>  tamsulosin 0.8 milliGRAM(s) Oral at bedtime  trimethoprim   80 mG/sulfamethoxazole 400 mG 1 Tablet(s) Oral daily  valGANciclovir 900 milliGRAM(s) Oral daily    REVIEW OF SYSTEMS  --------------------------------------------------------------------------------  Limited ROS due to medical condition  Gen: no lethargy  Respiratory: No dyspnea  CV: No chest pain  GI: No abdominal pain    All other systems were reviewed and are negative, except as noted.    VITALS/PHYSICAL EXAM  --------------------------------------------------------------------------------  T(C): 36.8 (07-20-20 @ 09:00), Max: 36.8 (07-20-20 @ 09:00)  HR: 81 (07-20-20 @ 10:10) (75 - 94)  BP: 120/84 (07-20-20 @ 10:10) (105/59 - 137/65)  RR: 18 (07-20-20 @ 10:10) (18 - 20)  SpO2: 99% (07-20-20 @ 10:10) (92% - 100%)  Wt(kg): --      07-19-20 @ 07:01  -  07-20-20 @ 07:00  --------------------------------------------------------  IN: 1380 mL / OUT: 3425 mL / NET: -2045 mL    07-20-20 @ 07:01  -  07-20-20 @ 10:57  --------------------------------------------------------  IN: 350 mL / OUT: 0 mL / NET: 350 mL      Physical Exam:  	Gen:  Resting comfortably.  	HEENT: EOMI  	Pulm: good air entry bilaterally  	CV: RRR, S1S2; no rub  	Abd: +BS, soft, surgical incisions healing.         	MSK: trace edema dorsum of feet. Right foot dressing on for heel ulcer  	Neuro: awake  	  LABS/STUDIES  --------------------------------------------------------------------------------              9.2    3.66  >-----------<  106      [07-20-20 @ 05:36]              28.5     135  |  102  |  37  ----------------------------<  134      [07-20-20 @ 05:36]  5.4   |  22  |  2.04        Ca     9.1     [07-20-20 @ 05:36]      Mg     1.6     [07-20-20 @ 05:36]      Phos  4.0     [07-20-20 @ 05:36]    Creatinine Trend:  SCr 2.04 [07-20 @ 05:36]  SCr 2.35 [07-19 @ 22:21]  SCr 1.65 [07-19 @ 10:27]  SCr 1.82 [07-19 @ 05:52]  SCr 1.52 [07-18 @ 06:12]    Tacrolimus (), Serum: 5.5 ng/mL (07-20 @ 07:26)  Tacrolimus (), Serum: 7.6 ng/mL (07-19 @ 07:49)  Tacrolimus (), Serum: 6.3 ng/mL (07-18 @ 09:35)  Tacrolimus (), Serum: 8.4 ng/mL (07-17 @ 08:47)

## 2020-07-20 NOTE — PROGRESS NOTE ADULT - SUBJECTIVE AND OBJECTIVE BOX
INTERVAL HPI/OVERNIGHT EVENTS:    events noted  tolerating diet  having bowel movements  remains confused        MEDICATIONS  (STANDING):  aspirin enteric coated 81 milliGRAM(s) Oral daily  chlorhexidine 2% Cloths 1 Application(s) Topical <User Schedule>  collagenase Ointment 1 Application(s) Topical daily  furosemide    Tablet 80 milliGRAM(s) Oral every 8 hours  insulin glargine Injectable (LANTUS) 16 Unit(s) SubCutaneous at bedtime  insulin lispro (HumaLOG) corrective regimen sliding scale   SubCutaneous three times a day before meals  insulin lispro (HumaLOG) corrective regimen sliding scale   SubCutaneous at bedtime  insulin lispro Injectable (HumaLOG) 10 Unit(s) SubCutaneous before dinner  insulin lispro Injectable (HumaLOG) 8 Unit(s) SubCutaneous before breakfast  insulin lispro Injectable (HumaLOG) 8 Unit(s) SubCutaneous before lunch  linezolid  IVPB 600 milliGRAM(s) IV Intermittent every 12 hours  magnesium oxide 800 milliGRAM(s) Oral two times a day with meals  meropenem  IVPB 1000 milliGRAM(s) IV Intermittent every 8 hours  nystatin    Suspension 055210 Unit(s) Swish and Swallow four times a day  nystatin Cream 1 Application(s) Topical two times a day  pantoprazole  Injectable 40 milliGRAM(s) IV Push every 12 hours  polyethylene glycol 3350 17 Gram(s) Oral daily  predniSONE   Tablet 5 milliGRAM(s) Oral daily  senna 2 Tablet(s) Oral at bedtime  tacrolimus 3 milliGRAM(s) Oral <User Schedule>  tacrolimus 2 milliGRAM(s) Oral <User Schedule>  tamsulosin 0.8 milliGRAM(s) Oral at bedtime  trimethoprim   80 mG/sulfamethoxazole 400 mG 1 Tablet(s) Oral daily  valGANciclovir 450 milliGRAM(s) Oral daily    MEDICATIONS  (PRN):  glucagon  Injectable 1 milliGRAM(s) IntraMuscular once PRN Glucose <70 milliGRAM(s)/deciLiter      Allergies    codeine (Anaphylaxis)    Intolerances        Review of Systems:    General:  No wt loss, fevers, chills, night sweats,fatigue,   Eyes:  Good vision, no reported pain  ENT:  No sore throat, pain, runny nose, dysphagia  CV:  No pain, palpitatioins, hypo/hypertension  Resp:  No dyspnea, cough, tachypnea, wheezing  GI:  No pain, No nausea, No vomiting, No diarrhea, No constipatiion, No weight loss, No fever, No pruritis, No rectal bleeding, No tarry stools, No dysphagia,  :  No pain, bleeding, incontinence, nocturia  Muscle:  No pain, weakness  Neuro:  No weakness, tingling, memory problems  Psych:  No fatigue, insomnia, mood problems, depression  Endocrine:  No polyuria, polydypsia, cold/heat intolerance  Heme:  No petechiae, ecchymosis, easy bruisability  Skin:  No rash, tattoos, scars, edema      Vital Signs Last 24 Hrs  T(C): 36.3 (12 Jul 2020 15:00), Max: 36.8 (11 Jul 2020 19:00)  T(F): 97.3 (12 Jul 2020 15:00), Max: 98.2 (11 Jul 2020 19:00)  HR: 80 (12 Jul 2020 15:00) (72 - 83)  BP: 109/59 (12 Jul 2020 15:00) (94/55 - 154/68)  BP(mean): 78 (12 Jul 2020 15:00) (70 - 125)  RR: 15 (12 Jul 2020 15:00) (9 - 28)  SpO2: 96% (12 Jul 2020 15:00) (96% - 100%)    PHYSICAL EXAM:    Constitutional: NAD  HEENT: sclera anicteric   Neck: No LAD, supple  Gastrointestinal: BS+, soft, ND, +incisional tenderness, incisions c/d/i  Extremities: +b/l peripheral edema  Neurological: awake, alert, +tremors - improving     LABS:                        9.3    5.51  )-----------( 98       ( 12 Jul 2020 01:54 )             28.6     07-12    131<L>  |  95<L>  |  31<H>  ----------------------------<  217<H>  5.0   |  27  |  1.26    Ca    8.1<L>      12 Jul 2020 12:22  Phos  4.0     07-12  Mg     2.0     07-12    TPro  4.4<L>  /  Alb  2.6<L>  /  TBili  1.1  /  DBili  0.5<H>  /  AST  64<H>  /  ALT  98<H>  /  AlkPhos  212<H>  07-12    PT/INR - ( 12 Jul 2020 01:54 )   PT: 12.7 sec;   INR: 1.12 ratio         PTT - ( 12 Jul 2020 01:54 )  PTT:28.7 sec      RADIOLOGY & ADDITIONAL TESTS:

## 2020-07-20 NOTE — PROGRESS NOTE ADULT - ASSESSMENT
64 M hx of DM, HLD, GERD, HFpEF with mild LV diastolic dysfunction, and decompensated JEAN cirrhosis c/b ascites with hx of SBP, HE, duodenal ulcer, GAVE and duodenal AVM s/p APC (last on 10/11/19), UNOS listed for liver transplantation at [facility]. 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 S/p liver transplant 7/2 transferred from SICU to floor on 7/17/20 now with signs of tacrolimus toxicity and significant hospital delerium.     Impression:  #S/p liver transplant 7/2: liver enzymes continue to downtrend  OR details:  - L groin cutdown for vv bypass   - anastomosis: bicaval end-end/CBD duct-duct/HA & PV end-end, all standard anatomy.    - IOC with good flow  - Simulect induction. 500mg Solumedrol  - JPs x3 with T-Tube  - 14U pRBC, 14U FFP, 10 PLT, 11 CRYO, 500mL returned from cell saver, EBL 5L, UOP 1100mL  Recipient Info:   ABO: A  CMV:  Neg  EBV Positive  Donor:  Donor ID:  [id number] Match: [id number]  Age: 29 ABO:  A1 High Risk:   No COD: Cardiovascular  Anti CMV positive, EBV IgG- positive  HepBcAb-neg, Hepatitis C-DANA- neg, Hepatitis C ab-neg    # Halluciniations/agitation: worse at night. Getting significant chemical sedations. At times combative. Likely secondary to sleep wake cycle disturbances vs. ICU delerium vs. medication induced. On Seroquel, mirtazepine and melatonin at night. Olanzapine added PRN per Behavioral Health 7/18/20.  # Tremors: Involving bilateral UE - concern for tacrolimus toxicity requiring dose adjustments per Transplant Surgery.   # MISA: serum Cr improved, however UOP remains normal. Suspect MISA due to overdiuresis vs. tacrolimus toxicity vs. side effect of meropenem vs transient hypotension  # Elevated liver enzymes: Resolved with mildly elevated ALK-P. Possible secondary to DILI vs. sepsis induced from VRE bacteremia.   # VRE on culture from 7/10: Repeat culture 7/12 negative. On linezolid, s/p meropenem. CT showing groin hematoma, s/p aspiration showing likely seroma and low c/f infection per ID.   #GI bleed: Resolved. Hgb stable with no overt bleeding post-transplant. 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 GAVE and PHG likely improved posttransplant.  # Abnormal Chest X-Ray c/f evolving PNA vs atelectasis. No signs of active infection  # Hypertension: Had episode of hypotension. Now BP normal range.   # Lower ext edema: Improved with Lasix. Diuretics discontinued for MISA.   #R posterior heel wound w/ overlying eschar –Local wound care. no signs of infections, XR neg for gas, evaluated by podiatry and ID. MRI w/o contrast limited, but no overt signs of active infection.  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     Recommendation:  - minimize chemical sedation, d/c zyprexa and remron; f/u behavioral health  - continue to hold diuretics  - continue prednisone 5 mg PO daily  - switch tacro to cyclosporine 75mg BID given c/f tacro toxicity  - continue Cellcept (currently on 750 mg BID) - dosing as per transplant surgery  - stop ursodiol  - linezolid for VE bacteremia as per Transplant ID; last dose 7/24  - continue nystatin, Bactrim and Valcyte ppx  - physical therapy daily to get OOB and ambulating  - continue with mag oxide 200 mg BID  - monitor Hb on daily CBC - continue PO PPI BID, monitor bowel movements  - follow up Wound care recommendations for R heel ulcer  - follow up other Transplant surgery recommendations 64 M hx of DM, HLD, GERD, HFpEF with mild LV diastolic dysfunction, and decompensated JEAN cirrhosis c/b ascites with hx of SBP, HE, duodenal ulcer, GAVE and duodenal AVM s/p APC (last on 10/11/19), UNOS listed for liver transplantation at [facility]. 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 S/p liver transplant 7/2 transferred from SICU to floor on 7/17/20 now with signs of tacrolimus toxicity and significant hospital delerium.     Impression:  #S/p liver transplant 7/2: liver enzymes continue to downtrend  OR details:  - L groin cutdown for vv bypass   - anastomosis: bicaval end-end/CBD duct-duct/HA & PV end-end, all standard anatomy.    - IOC with good flow  - Simulect induction. 500mg Solumedrol  - JPs x3 with T-Tube  - 14U pRBC, 14U FFP, 10 PLT, 11 CRYO, 500mL returned from cell saver, EBL 5L, UOP 1100mL  Recipient Info:   ABO: A  CMV:  Neg  EBV Positive  Donor:  Donor ID:  [id number] Match: [id number]  Age: 29 ABO:  A1 High Risk:   No COD: Cardiovascular  Anti CMV positive, EBV IgG- positive  HepBcAb-neg, Hepatitis C-DANA- neg, Hepatitis C ab-neg    # Halluciniations/agitation: worse at night. Getting significant chemical sedations. At times combative. Likely secondary to sleep wake cycle disturbances vs. ICU delerium vs. medication induced. On Seroquel, mirtazepine and melatonin at night. Olanzapine added PRN per Behavioral Health 7/18/20.  # Tremors: Involving bilateral UE - concern for tacrolimus toxicity requiring dose adjustments per Transplant Surgery.   # MISA: serum Cr improved, however UOP remains normal. Suspect MISA due to overdiuresis vs. tacrolimus toxicity vs. side effect of meropenem vs transient hypotension  # Elevated liver enzymes: Resolved with mildly elevated ALK-P. Possible secondary to DILI vs. sepsis induced from VRE bacteremia.   # VRE on culture from 7/10: Repeat culture 7/12 negative. On linezolid, s/p meropenem. CT showing groin hematoma, s/p aspiration showing likely seroma and low c/f infection per ID.   #GI bleed: Resolved. Hgb stable with no overt bleeding post-transplant. 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 GAVE and PHG likely improved posttransplant.  # Abnormal Chest X-Ray c/f evolving PNA vs atelectasis. No signs of active infection  # Hypertension: Had episode of hypotension. Now BP normal range.   # Lower ext edema: Improved with Lasix. Diuretics discontinued for MISA.   #R posterior heel wound w/ overlying eschar –Local wound care. no signs of infections, XR neg for gas, evaluated by podiatry and ID. MRI w/o contrast limited, but no overt signs of active infection.  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     Recommendation:  - recheck blood and urine culture  - minimize chemical sedation, d/c zyprexa and remron; f/u behavioral health  - continue to hold diuretics  - continue prednisone 5 mg PO daily  - switch tacro to cyclosporine 75mg BID given c/f tacro toxicity  - continue Cellcept (currently on 750 mg BID) - dosing as per transplant surgery  - stop ursodiol  - linezolid for VE bacteremia as per Transplant ID; last dose 7/24  - continue nystatin, Bactrim and Valcyte ppx  - physical therapy daily to get OOB and ambulating  - continue with mag oxide 200 mg BID  - monitor Hb on daily CBC - continue PO PPI BID, monitor bowel movements  - follow up Wound care recommendations for R heel ulcer  - follow up other Transplant surgery recommendations 64 M hx of DM, HLD, GERD, HFpEF with mild LV diastolic dysfunction, and decompensated JEAN cirrhosis c/b ascites with hx of SBP, HE, duodenal ulcer, GAVE and duodenal AVM s/p APC (last on 10/11/19), UNOS listed for liver transplantation at [facility]. 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 S/p liver transplant 7/2 transferred from SICU to floor on 7/17/20 now with signs of tacrolimus toxicity and significant hospital delerium.     Impression:  #S/p liver transplant 7/2: liver enzymes continue to downtrend  OR details:  - L groin cutdown for vv bypass   - anastomosis: bicaval end-end/CBD duct-duct/HA & PV end-end, all standard anatomy.    - IOC with good flow  - Simulect induction. 500mg Solumedrol  - JPs x3 with T-Tube  - 14U pRBC, 14U FFP, 10 PLT, 11 CRYO, 500mL returned from cell saver, EBL 5L, UOP 1100mL  Recipient Info:   ABO: A  CMV:  Neg  EBV Positive  Donor:  Donor ID:  [id number] Match: [id number]  Age: 29 ABO:  A1 High Risk:   No COD: Cardiovascular  Anti CMV positive, EBV IgG- positive  HepBcAb-neg, Hepatitis C-DANA- neg, Hepatitis C ab-neg    # Halluciniations/agitation: worse at night. Getting significant chemical sedations. At times combative. Likely secondary to sleep wake cycle disturbances vs. ICU delerium vs. medication induced. On Seroquel, mirtazepine and melatonin at night. Olanzapine added PRN per Behavioral Health 7/18/20.  # Tremors: Involving bilateral UE - concern for tacrolimus toxicity requiring dose adjustments per Transplant Surgery.   # IMSA: serum Cr improved, however UOP remains normal. Suspect MISA due to overdiuresis vs. tacrolimus toxicity vs. side effect of meropenem vs transient hypotension  # Elevated liver enzymes: Resolved with mildly elevated ALK-P. Possible secondary to DILI vs. sepsis induced from VRE bacteremia.   # VRE on culture from 7/10: Repeat culture 7/12 negative. On linezolid, s/p meropenem. CT showing groin hematoma, s/p aspiration showing likely seroma and low c/f infection per ID.   #GI bleed: Resolved. Hgb stable with no overt bleeding post-transplant. 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 GAVE and PHG likely improved posttransplant.  # Abnormal Chest X-Ray c/f evolving PNA vs atelectasis. No signs of active infection  # Hypertension: Had episode of hypotension. Now BP normal range.   # Lower ext edema: Improved with Lasix. Diuretics discontinued for MISA.   #R posterior heel wound w/ overlying eschar –Local wound care. no signs of infections, XR neg for gas, evaluated by podiatry and ID. MRI w/o contrast limited, but no overt signs of active infection.  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     Recommendation:  - recheck blood and urine culture  - minimize chemical sedation, d/c zyprexa and remron; f/u behavioral health  - continue to hold diuretics  - continue prednisone 5 mg PO daily  - switch tacro to cyclosporine 100mg BID given c/f tacro toxicity  - continue Cellcept (currently on 750 mg BID) - dosing as per transplant surgery  - stop ursodiol  - linezolid for VE bacteremia as per Transplant ID; last dose 7/24  - continue nystatin, Bactrim and Valcyte ppx  - physical therapy daily to get OOB and ambulating  - continue with mag oxide 200 mg BID  - monitor Hb on daily CBC - continue PO PPI BID, monitor bowel movements  - follow up Wound care recommendations for R heel ulcer  - follow up other Transplant surgery recommendations

## 2020-07-20 NOTE — PROGRESS NOTE ADULT - PROBLEM SELECTOR PLAN 1
Pt. with non-oluric MISA in the setting of hypotensive episode. Pt. with normal Scr post trasnplant. Had two episodes of hypotension on 7/16 & 7/17. Scr initially increased to 1.40 on 7/16/20 and increased further to 2.35 yesterday. Scr today has improved to 2.05. Pt. with likely hemodynamically mediated MISA ? ATN. Optimize hemodynamics. Encourage PO intake. Monior labs and urine output. Avoid potential nephrotoxins.

## 2020-07-20 NOTE — PROGRESS NOTE ADULT - PROBLEM SELECTOR PLAN 2
Serum potassium mildly elevated at 5.4 in the setting of MISA. Repeat serum potassium in afternoon. If still elevated, can give 1 dose of Lokelma 10 grams. Low potassium diet advised. Monitor serum potassium.    If any questions, please feel free to contact me  Arian Arrington   Nephrology Fellow  [phone]  (After 5 pm or on weekends please page the on-call fellow)

## 2020-07-20 NOTE — PROGRESS NOTE ADULT - SUBJECTIVE AND OBJECTIVE BOX
Chief Complaint/Follow-up on:     Subjective:    MEDICATIONS  (STANDING):  aspirin enteric coated 81 milliGRAM(s) Oral daily  chlorhexidine 2% Cloths 1 Application(s) Topical <User Schedule>  collagenase Ointment 1 Application(s) Topical daily  cycloSPORINE  , modified (GENGRAF) 100 milliGRAM(s) Oral <User Schedule>  insulin glargine Injectable (LANTUS) 10 Unit(s) SubCutaneous at bedtime  insulin lispro (HumaLOG) corrective regimen sliding scale   SubCutaneous three times a day before meals  insulin lispro (HumaLOG) corrective regimen sliding scale   SubCutaneous at bedtime  insulin lispro Injectable (HumaLOG) 4 Unit(s) SubCutaneous three times a day before meals  linezolid  IVPB 600 milliGRAM(s) IV Intermittent every 12 hours  magnesium oxide 800 milliGRAM(s) Oral two times a day with meals  melatonin 5 milliGRAM(s) Oral at bedtime  mycophenolate mofetil 750 milliGRAM(s) Oral <User Schedule>  nystatin    Suspension 595770 Unit(s) Swish and Swallow four times a day  nystatin Cream 1 Application(s) Topical two times a day  pantoprazole    Tablet 40 milliGRAM(s) Oral before breakfast  predniSONE   Tablet 5 milliGRAM(s) Oral daily  QUEtiapine 25 milliGRAM(s) Oral <User Schedule>  tamsulosin 0.8 milliGRAM(s) Oral at bedtime  trimethoprim   80 mG/sulfamethoxazole 400 mG 1 Tablet(s) Oral daily  valGANciclovir 900 milliGRAM(s) Oral daily    MEDICATIONS  (PRN):      PHYSICAL EXAM:  VITALS: T(C): 36.7 (07-20-20 @ 17:00)  T(F): 98.1 (07-20-20 @ 17:00), Max: 98.4 (07-20-20 @ 13:00)  HR: 92 (07-20-20 @ 17:00) (77 - 94)  BP: 137/61 (07-20-20 @ 17:00) (113/85 - 137/65)  RR:  (18 - 18)  SpO2:  (92% - 100%)  Wt(kg): --  GENERAL: NAD, well-groomed, well-developed  EYES: No proptosis, no injection  HEENT:  Atraumatic, Normocephalic, moist mucous membranes  THYROID: Normal size, no palpable nodules  RESPIRATORY: Clear to auscultation bilaterally; No rales, rhonchi, wheezing, or rubs  CARDIOVASCULAR: Regular rate and rhythm; No murmurs; no peripheral edema  GI: Soft, nontender, non distended, normal bowel sounds  CUSHING'S SIGNS: no striae    POCT Blood Glucose.: 148 mg/dL (07-20-20 @ 13:04)  POCT Blood Glucose.: 133 mg/dL (07-20-20 @ 08:11)  POCT Blood Glucose.: 92 mg/dL (07-19-20 @ 22:11)  POCT Blood Glucose.: 76 mg/dL (07-19-20 @ 21:37)  POCT Blood Glucose.: 157 mg/dL (07-19-20 @ 17:11)  POCT Blood Glucose.: 101 mg/dL (07-19-20 @ 14:26)  POCT Blood Glucose.: 70 mg/dL (07-19-20 @ 12:46)  POCT Blood Glucose.: 69 mg/dL (07-19-20 @ 12:44)  POCT Blood Glucose.: 135 mg/dL (07-19-20 @ 08:21)  POCT Blood Glucose.: 180 mg/dL (07-18-20 @ 21:22)  POCT Blood Glucose.: 191 mg/dL (07-18-20 @ 18:23)  POCT Blood Glucose.: 101 mg/dL (07-18-20 @ 12:02)  POCT Blood Glucose.: 231 mg/dL (07-18-20 @ 08:51)  POCT Blood Glucose.: 191 mg/dL (07-17-20 @ 21:23)    07-20    135  |  102  |  37<H>  ----------------------------<  134<H>  5.4<H>   |  22  |  2.04<H>    EGFR if : 39<L>  EGFR if non : 33<L>    Ca    9.1      07-20  Mg     1.6     07-20  Phos  4.0     07-20    TPro  5.2<L>  /  Alb  3.0<L>  /  TBili  0.8  /  DBili  0.4<H>  /  AST  13  /  ALT  24  /  AlkPhos  125<H>  07-20          Thyroid Function Tests: Chief Complaint/Follow-up on: t2D    Subjective: Patient was up all night so the RN was letting him sleep this afternoon. He ate all of his b'fast and was about to be fed when I saw him about 3pm.     MEDICATIONS  (STANDING):  aspirin enteric coated 81 milliGRAM(s) Oral daily  chlorhexidine 2% Cloths 1 Application(s) Topical <User Schedule>  collagenase Ointment 1 Application(s) Topical daily  cycloSPORINE  , modified (GENGRAF) 100 milliGRAM(s) Oral <User Schedule>  insulin glargine Injectable (LANTUS) 10 Unit(s) SubCutaneous at bedtime  insulin lispro (HumaLOG) corrective regimen sliding scale   SubCutaneous three times a day before meals  insulin lispro (HumaLOG) corrective regimen sliding scale   SubCutaneous at bedtime  insulin lispro Injectable (HumaLOG) 4 Unit(s) SubCutaneous three times a day before meals  linezolid  IVPB 600 milliGRAM(s) IV Intermittent every 12 hours  magnesium oxide 800 milliGRAM(s) Oral two times a day with meals  melatonin 5 milliGRAM(s) Oral at bedtime  mycophenolate mofetil 750 milliGRAM(s) Oral <User Schedule>  nystatin    Suspension 588987 Unit(s) Swish and Swallow four times a day  nystatin Cream 1 Application(s) Topical two times a day  pantoprazole    Tablet 40 milliGRAM(s) Oral before breakfast  predniSONE   Tablet 5 milliGRAM(s) Oral daily  QUEtiapine 25 milliGRAM(s) Oral <User Schedule>  tamsulosin 0.8 milliGRAM(s) Oral at bedtime  trimethoprim   80 mG/sulfamethoxazole 400 mG 1 Tablet(s) Oral daily  valGANciclovir 900 milliGRAM(s) Oral daily    MEDICATIONS  (PRN):      PHYSICAL EXAM:  VITALS: T(C): 36.7 (07-20-20 @ 17:00)  T(F): 98.1 (07-20-20 @ 17:00), Max: 98.4 (07-20-20 @ 13:00)  HR: 92 (07-20-20 @ 17:00) (77 - 94)  BP: 137/61 (07-20-20 @ 17:00) (113/85 - 137/65)  RR:  (18 - 18)  SpO2:  (92% - 100%)  Wt(kg): --  GENERAL: minimal verbal response, well-groomed, well-developed  RESPIRATORY: Clear to auscultation bilaterally; No rales, rhonchi, wheezing, or rubs  CARDIOVASCULAR: Regular rate and rhythm; No murmurs; no peripheral edema  GI: Soft, nontender, non distended, normal bowel sounds  PSYCH: +psychomotor agitation of arms b/l, +reactive affect    POCT Blood Glucose.: 148 mg/dL (07-20-20 @ 13:04)  POCT Blood Glucose.: 133 mg/dL (07-20-20 @ 08:11)  POCT Blood Glucose.: 92 mg/dL (07-19-20 @ 22:11)  POCT Blood Glucose.: 76 mg/dL (07-19-20 @ 21:37)  POCT Blood Glucose.: 157 mg/dL (07-19-20 @ 17:11)  POCT Blood Glucose.: 101 mg/dL (07-19-20 @ 14:26)  POCT Blood Glucose.: 70 mg/dL (07-19-20 @ 12:46)  POCT Blood Glucose.: 69 mg/dL (07-19-20 @ 12:44)  POCT Blood Glucose.: 135 mg/dL (07-19-20 @ 08:21)  POCT Blood Glucose.: 180 mg/dL (07-18-20 @ 21:22)  POCT Blood Glucose.: 191 mg/dL (07-18-20 @ 18:23)  POCT Blood Glucose.: 101 mg/dL (07-18-20 @ 12:02)  POCT Blood Glucose.: 231 mg/dL (07-18-20 @ 08:51)  POCT Blood Glucose.: 191 mg/dL (07-17-20 @ 21:23)    07-20    135  |  102  |  37<H>  ----------------------------<  134<H>  5.4<H>   |  22  |  2.04<H>    EGFR if : 39<L>  EGFR if non : 33<L>    Ca    9.1      07-20  Mg     1.6     07-20  Phos  4.0     07-20    TPro  5.2<L>  /  Alb  3.0<L>  /  TBili  0.8  /  DBili  0.4<H>  /  AST  13  /  ALT  24  /  AlkPhos  125<H>  07-20

## 2020-07-20 NOTE — PROGRESS NOTE ADULT - SUBJECTIVE AND OBJECTIVE BOX
Transplant Surgery - Multidisciplinary Rounds  --------------------------------------------------------------  OLTx      Date:  7/2/2020       POD#18    Present: Patient seen and examined during AM rounds with multidisciplinary team including Transplant Surgeon: Dr. Garcia/Marina.  Transplant Nephrologist Dr. FAY Patel, Transplant Pharmacy: [facility], Inpatient: OLY Bingham/Artem/Dr. Quezada. Disciplines not in attendance will be notified of the plan.     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     Re-admitted on 6/11:   ·	worsening HE  ·	UTI/VRE: tx with linezolid 6/15-22, bactrim DS 6/22 - 7/2  ·	MISA/Hyponatremia  ·	UGI bleed (melena) s/p EGD (6/22) c/w hemorrhagic duodenitis/gastritis; Grade 2 EV; requiring multiple transfusions  ·	Known h/o R foot ulcer (MRI neg for OM)  ·	s/p OLTx on 7/2/2020, post op liver doppler with patent vessels  ·	post op course c/b delirium and VRE bacteremia (7/10)     Interval events:  - POD 18 s/p OLT with good graft function;  LFTs stable  - Awake restless, continuing to have waxing and waning mental status with periods of lethargy  - Tolerating reg diet; having bowel function   - Pain well controlled   - OOB to chair with heavy assist, working with PT/OT    Potential Discharge date: pending clinical improvement     Education:  Medications    Plan of care:  See Below      MEDICATIONS  (STANDING):  aspirin enteric coated 81 milliGRAM(s) Oral daily  chlorhexidine 2% Cloths 1 Application(s) Topical <User Schedule>  collagenase Ointment 1 Application(s) Topical daily  cycloSPORINE  , modified (GENGRAF) 100 milliGRAM(s) Oral once  cycloSPORINE  , modified (GENGRAF) 100 milliGRAM(s) Oral <User Schedule>  insulin glargine Injectable (LANTUS) 10 Unit(s) SubCutaneous at bedtime  insulin lispro (HumaLOG) corrective regimen sliding scale   SubCutaneous three times a day before meals  insulin lispro (HumaLOG) corrective regimen sliding scale   SubCutaneous at bedtime  insulin lispro Injectable (HumaLOG) 4 Unit(s) SubCutaneous three times a day before meals  linezolid  IVPB 600 milliGRAM(s) IV Intermittent every 12 hours  magnesium oxide 800 milliGRAM(s) Oral two times a day with meals  melatonin 5 milliGRAM(s) Oral at bedtime  mycophenolate mofetil 750 milliGRAM(s) Oral <User Schedule>  nystatin    Suspension 667219 Unit(s) Swish and Swallow four times a day  nystatin Cream 1 Application(s) Topical two times a day  pantoprazole    Tablet 40 milliGRAM(s) Oral before breakfast  predniSONE   Tablet 5 milliGRAM(s) Oral daily  QUEtiapine 25 milliGRAM(s) Oral <User Schedule>  tamsulosin 0.8 milliGRAM(s) Oral at bedtime  trimethoprim   80 mG/sulfamethoxazole 400 mG 1 Tablet(s) Oral daily  valGANciclovir 900 milliGRAM(s) Oral daily    MEDICATIONS  (PRN):      PAST MEDICAL & SURGICAL HISTORY:  GIB (gastrointestinal bleeding)  GERD with esophagitis: Gastritis &amp; Non Bleeding Ulcers  Hepatic encephalopathy  Obesity  Fatty liver disease, nonalcoholic  Renal stones: 25 years ago  Hypertension  Neuropathy  Hypercholesteremia  Diabetes  S/P cholecystectomy      Vital Signs Last 24 Hrs  T(C): 36.8 (20 Jul 2020 09:00), Max: 36.8 (20 Jul 2020 09:00)  T(F): 98.2 (20 Jul 2020 09:00), Max: 98.2 (20 Jul 2020 09:00)  HR: 94 (20 Jul 2020 09:00) (75 - 94)  BP: 120/56 (20 Jul 2020 09:00) (105/59 - 137/65)  BP(mean): --  RR: 18 (20 Jul 2020 09:00) (18 - 20)  SpO2: 100% (20 Jul 2020 09:00) (92% - 100%)    I&O's Summary    19 Jul 2020 07:01  -  20 Jul 2020 07:00  --------------------------------------------------------  IN: 1380 mL / OUT: 3425 mL / NET: -2045 mL    20 Jul 2020 07:01  -  20 Jul 2020 09:26  --------------------------------------------------------  IN: 350 mL / OUT: 0 mL / NET: 350 mL                              9.2    3.66  )-----------( 106      ( 20 Jul 2020 05:36 )             28.5     07-20    135  |  102  |  37<H>  ----------------------------<  134<H>  5.4<H>   |  22  |  2.04<H>    Ca    9.1      20 Jul 2020 05:36  Phos  4.0     07-20  Mg     1.6     07-20    TPro  5.2<L>  /  Alb  3.0<L>  /  TBili  0.8  /  DBili  0.4<H>  /  AST  13  /  ALT  24  /  AlkPhos  125<H>  07-20    Tacrolimus (), Serum: 7.6 ng/mL (07-19 @ 07:49)        Culture - Body Fluid with Gram Stain (collected 07-14-20 @ 00:32)  Source: .Body Fluid Abdominal Fluid  Gram Stain (07-14-20 @ 04:12):    polymorphonuclear leukocytes seen    No organisms seen    by cytocentrifuge  Final Report (07-18-20 @ 16:41):    No growth at 5 days                Review of systems  Gen: weakness  Skin: No rashes  Head/Eyes/Ears/Mouth: No headache; Normal hearing; Normal vision w/o blurriness; No sinus pain/discomfort, sore throat  Respiratory: No dyspnea, cough, wheezing, hemoptysis  CV: No chest pain, PND, orthopnea  GI: Mild abdominal pain at surgical incision site; denies diarrhea, constipation, nausea, vomiting, melena, hematochezia  : No increased frequency, dysuria, hematuria, nocturia  MSK: No joint pain/swelling; no back pain; no edema, RT groin incision with staples intact   Neuro: No dizziness/lightheadedness, weakness, seizures, numbness, tingling  Heme: No easy bruising or bleeding  Endo: No heat/cold intolerance  Psych: No significant nervousness, anxiety, stress, depression  All other systems were reviewed and are negative, except as noted.      PHYSICAL EXAM:  Constitutional: Restless and confused at times  Eyes: Anicteric, PERRLA  ENMT: nc/at  Neck: no central line, no JVD  Respiratory: CTA B/L, unlabored breathing   Cardiovascular: RRR  Gastrointestinal: soft, NT, ND, incision c/d/i, T-tube tied   Genitourinary: Voiding spontaneously  Extremities: heel wound without drainage or purulent material, edema improving   Vascular: Palpable dp pulses bilaterally  Neurological: Confused with easy reorientation, +mild tremors  Skin: no rashes  Musculoskeletal: Moving all extremities  Psychiatric: Responsive

## 2020-07-20 NOTE — PROGRESS NOTE ADULT - ASSESSMENT
63 yo M with hx of T2DM uncontrolled due to steroids x 10 years with neuropathy and R DFU of the heel, HTN, HLD, MGUS, and decompensated JEAN cirrhosis s/p liver x-plant on 7/2/2020 admitted 6/11/2020 with confusion secondary to liver failure and encephalopathy. Endocrine consulted for glycemic control in the setting of steroid use now on Prednisone 5mg daily. BG goal (100-180mg/dl).

## 2020-07-20 NOTE — PROGRESS NOTE ADULT - ASSESSMENT
ASSESSMENT:  63 y/o M PMHx decompensated JEAN Cirrhosis on transplant list, DMII, HFpEF, recent admission for ESBL E coli prostatic abscess 2/2 4 wks ertapenem, hx of ESBL UTIs with prostate abscess s/p IV abx, recent VRE urinary colonization came to hospital for worsening jaundice and episode of confusion, resolved PTA. s/p treatment course for possible VRE UTI. Now s/p OLT on 7/2/20. Course complicated by Encephalopathy and Tremors. Blood Cultures (7/10) with VRE sensitive to Linezolid. CT C/A/P without obvious intraabdominal source. Hypoxia prior to positive BCx which may be related to sepsis CT Chest (7/11) without evidence of pneumonia. Had L groin fluid collection which appears to be consistent with seroma - sample obtained for culture also negative. No drainage, erythema or underlying fluctuance was appreciated at the right heel. MRI prior to transplant without obvious infection. Transferred from [facility] to [facility] on 7/17/2020.    #VRE Bacteremia, L Groin Collection, Encephalopathy  - Abd US showed 9cm Left groin collection: likely represents a seroma or lymphocele; aspirated - cx: PMN seen, no organism seen  - Continue Linezolid 600 mg IV Q12H (2 weeks from - BCx, End Date: 7/26), monitor CBC  - Continue monitoring tacrolimus levels  - TTE negative for endocarditis  - Recommend 2x blood cultures and U/A (UCx if pyuria) given intermittent hypotension over the weekend and worsened encephalopathy today    #Pancytopenia  - If worsening cytopenias CAN switch Linezolid to Daptomycin (or as alternate can hold Valcyte)    #Hypoxic Respiratory Failure, Encephalopathy  - No evidence of pneumonia and collection in left groin appears consistent with seroma  - now off the Meropenem    #s/p OLT   - Continue bactrim, valcyte (CMV D+/R-) for prophylaxis   - Monitor patient for tremors - monitor tacrolimus dose     #Right heel wound  --Podiatry on board    --Local wound care     I will continue to follow. Please feel free to contact me with any further questions.    Eusebio Pérez M.D.  [facility] Division of Infectious Disease  8AM-5PM: Pager Number [phone]  After Hours (or if no response): Please contact the Infectious Diseases Office at [phone]     The above assessment and plan were discussed with Dr Garcia and Dr Dong Mo

## 2020-07-20 NOTE — PROGRESS NOTE ADULT - PROBLEM SELECTOR PLAN 3
-defer statin in the setting of recent liver x-plant.  -discussed with JODY ALDRIDGE and PCA.  Liz Holley MD  Pager: [phone], between 9am-6pm  Nights and Weekends: [phone]

## 2020-07-20 NOTE — PROGRESS NOTE ADULT - ASSESSMENT
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    [] POD 18 s/p OLT   - Good graft function, LFTs stable. stop Actigal  - Liver doppler (7/12) with patent vessels  - Immuno: STOP FK, start CYCLOSPORINE 100mg BID, MMF 750mg bid; Pred 5mg daily  - PPX: valcyte/nystatin/bactrim  - Continue ASA 81mg daily  - Electrolytes: low K diet, keep Mag >2, continue PO supplementation  - h/o UGI bleed: Protonix 40mg daily   - Pain control PRN  - psych recs appreciated. will stop Remeron and Zyprexa given periods of lethargy, continue Seroquel.  Will adjust immunosuppression; sx likely related to FK intolerance  - DVT PPx: SCDs at all times    [] VRE bacteremia (7/10)  - ID following: on linezolid  - Bld cx from 7/12 with NG, L groin collection aspirated (7/13) -NG    [] Hyperglycemia  - Appreciate endocrinology recommendations  - Cont Lantus and Humalog insulin wih SSI coverage  - Fingersticks ac and qhs    [] Heel ulcer  Continue local wound care w/ santyl and DSD as per podiatry  -  [] Dispo:   - continue 1:1 with tele

## 2020-07-20 NOTE — PROGRESS NOTE ADULT - SUBJECTIVE AND OBJECTIVE BOX
Patient is a 64y old  Male who presents with a chief complaint of Liver failure, hepatic encephalopathy (2020 10:56)       INTERVAL HPI/OVERNIGHT EVENTS:  Patient seen and evaluated at bedside.  Pt is resting comfortable in NAD. Denies N/V/F/C.    Allergies    codeine (Anaphylaxis)    Intolerances        Vital Signs Last 24 Hrs  T(C): 36.9 (2020 13:00), Max: 36.9 (2020 13:00)  T(F): 98.4 (2020 13:00), Max: 98.4 (2020 13:00)  HR: 77 (2020 13:00) (77 - 94)  BP: 113/85 (2020 13:00) (113/85 - 137/65)  BP(mean): --  RR: 18 (2020 13:00) (18 - 20)  SpO2: 100% (2020 13:00) (92% - 100%)    LABS:                        9.2    3.66  )-----------( 106      ( 2020 05:36 )             28.5     07-20    135  |  102  |  37<H>  ----------------------------<  134<H>  5.4<H>   |  22  |  2.04<H>    Ca    9.1      2020 05:36  Phos  4.0     07-20  Mg     1.6     07-20    TPro  5.2<L>  /  Alb  3.0<L>  /  TBili  0.8  /  DBili  0.4<H>  /  AST  13  /  ALT  24  /  AlkPhos  125<H>  07-20    PT/INR - ( 2020 05:36 )   PT: 11.8 sec;   INR: 0.99 ratio         PTT - ( 2020 05:36 )  PTT:29.6 sec  Urinalysis Basic - ( 2020 00:16 )    Color: Colorless / Appearance: Clear / S.009 / pH: x  Gluc: x / Ketone: Negative  / Bili: Negative / Urobili: Negative   Blood: x / Protein: Negative / Nitrite: Negative   Leuk Esterase: Negative / RBC: 25 /hpf / WBC 3 /HPF   Sq Epi: x / Non Sq Epi: 0 /hpf / Bacteria: Negative      CAPILLARY BLOOD GLUCOSE      POCT Blood Glucose.: 148 mg/dL (2020 13:04)  POCT Blood Glucose.: 133 mg/dL (2020 08:11)  POCT Blood Glucose.: 92 mg/dL (2020 22:11)  POCT Blood Glucose.: 76 mg/dL (2020 21:37)  POCT Blood Glucose.: 157 mg/dL (2020 17:11)  POCT Blood Glucose.: 101 mg/dL (2020 14:26)      Lower Extremity Physical Exam:  Vascular: DP 2/4 PT 1/4 B/L, CFT <3 seconds B/L, Temperature gradient warm to cool B/L  Neuro: Epicritic sensation diminished to the level of heel B/L  Musculoskeletal/Ortho: no gross deformities  Skin:   Wound #1: right foot  Location: posterior heel  Size: 5 x 4 cm  Depth: subQ  Wound bed: granular tissue   Drainage: none  Odor: none  Periwound: no clinical signs of infection  Etiology: pressure, diabetes

## 2020-07-20 NOTE — PROGRESS NOTE ADULT - ATTENDING COMMENTS
I have personally seen, examined and participated in the care of this patient. I have reviewed all pertinent clinical information including history, physical exam , plan and fellow's note and agree with the plan.

## 2020-07-20 NOTE — PROGRESS NOTE ADULT - SUBJECTIVE AND OBJECTIVE BOX
Chief Complaint:  Patient is a 64y old  Male who presents with a chief complaint of Liver failure, hepatic encephalopathy (2020 09:22)      Interval Events: Pt still with disorientation and agitation. This morning is hungry and tolerating food well.    Allergies:  codeine (Anaphylaxis)      Hospital Medications:  aspirin enteric coated 81 milliGRAM(s) Oral daily  chlorhexidine 2% Cloths 1 Application(s) Topical <User Schedule>  collagenase Ointment 1 Application(s) Topical daily  cycloSPORINE  , modified (GENGRAF) 100 milliGRAM(s) Oral once  cycloSPORINE  , modified (GENGRAF) 100 milliGRAM(s) Oral <User Schedule>  insulin glargine Injectable (LANTUS) 10 Unit(s) SubCutaneous at bedtime  insulin lispro (HumaLOG) corrective regimen sliding scale   SubCutaneous three times a day before meals  insulin lispro (HumaLOG) corrective regimen sliding scale   SubCutaneous at bedtime  insulin lispro Injectable (HumaLOG) 4 Unit(s) SubCutaneous three times a day before meals  linezolid  IVPB 600 milliGRAM(s) IV Intermittent every 12 hours  magnesium oxide 800 milliGRAM(s) Oral two times a day with meals  melatonin 5 milliGRAM(s) Oral at bedtime  mycophenolate mofetil 750 milliGRAM(s) Oral <User Schedule>  nystatin    Suspension [id number] Unit(s) Swish and Swallow four times a day  nystatin Cream 1 Application(s) Topical two times a day  pantoprazole    Tablet 40 milliGRAM(s) Oral before breakfast  predniSONE   Tablet 5 milliGRAM(s) Oral daily  QUEtiapine 25 milliGRAM(s) Oral <User Schedule>  tamsulosin 0.8 milliGRAM(s) Oral at bedtime  trimethoprim   80 mG/sulfamethoxazole 400 mG 1 Tablet(s) Oral daily  valGANciclovir 900 milliGRAM(s) Oral daily      PMHX/PSHX:  GIB (gastrointestinal bleeding)  GERD with esophagitis  Hepatic encephalopathy  Obesity  Fatty liver disease, nonalcoholic  Renal stones  Hypertension  Neuropathy  Hypercholesteremia  Diabetes  S/P cholecystectomy  No significant past surgical history      Family history:  Family history of type 2 diabetes mellitus  Family history of hypertension  Family history of stomach cancer  No pertinent family history in first degree relatives      ROS: As per HPI, 14-point ROS negative otherwise.    PHYSICAL EXAM:     Vital Signs:  Vital Signs Last 24 Hrs  T(C): 36.8 (2020 09:00), Max: 36.8 (2020 09:00)  T(F): 98.2 (2020 09:00), Max: 98.2 (2020 09:00)  HR: 94 (:00) (75 - 94)  BP: 120/56 (:00) (105/59 - 137/65)  BP(mean): --  RR: 18 (:00) (18 - 20)  SpO2: 100% (:00) (92% - 100%)  Daily     Daily Weight in k.8 (2020 05:00)    GENERAL:  appears comfortable, NAD  HEENT:  Conjunctivae clear, sclera anicteric  CHEST:  No increased effort w/ respirations  HEART:  Regular rhythm & rate  ABDOMEN:  Soft, non-tender, non-distended +surgical incision remains intact +dressing over prior ANDREINA drain site C/D/I   EXTREMITIES:  no LE edema  SKIN:  No rash/erythema/ecchymoses/petechiae/wounds/jaundice  NEURO:  Alert, orientedx2 (person, place) +B/L UE fasisculations     LABS:                        9.2    3.66  )-----------( 106      ( 2020 05:36 )             28.5     07-20    135  |  102  |  37<H>  ----------------------------<  134<H>  5.4<H>   |  22  |  2.04<H>    Ca    9.1      2020 05:36  Phos  4.0     07-20  Mg     1.6     07-20    TPro  5.2<L>  /  Alb  3.0<L>  /  TBili  0.8  /  DBili  0.4<H>  /  AST  13  /  ALT  24  /  AlkPhos  125<H>  07-20    LIVER FUNCTIONS - ( 2020 05:36 )  Alb: 3.0 g/dL / Pro: 5.2 g/dL / ALK PHOS: 125 U/L / ALT: 24 U/L / AST: 13 U/L / GGT: x           PT/INR - ( 2020 05:36 )   PT: 11.8 sec;   INR: 0.99 ratio         PTT - ( 2020 05:36 )  PTT:29.6 sec  Urinalysis Basic - ( 2020 00:16 )    Color: Colorless / Appearance: Clear / S.009 / pH: x  Gluc: x / Ketone: Negative  / Bili: Negative / Urobili: Negative   Blood: x / Protein: Negative / Nitrite: Negative   Leuk Esterase: Negative / RBC: 25 /hpf / WBC 3 /HPF   Sq Epi: x / Non Sq Epi: 0 /hpf / Bacteria: Negative      Amylase Serum--      Lipase serum--       [id number]      Imaging:

## 2020-07-20 NOTE — PROGRESS NOTE ADULT - PROBLEM SELECTOR PLAN 1
-test BG AC/HS  -continue Lantus 10 units QHS and Humalog 4 units sq ac TID (hold if NPO)  -Humalog low correction scale AC and QHS  -monitor FS ac and hs  Plan discussed with team.   DISPO: basal/bolus, with doses TBD  -Pt can resume f/u with Dr. Mathur or   [facility] [address] [facility] [address] [place] [phone].

## 2020-07-20 NOTE — PROGRESS NOTE ADULT - SUBJECTIVE AND OBJECTIVE BOX
Follow Up:  s/p OLT, VRE Bacteremia    Interval History: afebrile overnight. lethargic and agitated this AM     REVIEW OF SYSTEMS  [ x ] ROS unobtainable because:  patient encephalopathic   [  ] All other systems negative except as noted below    Constitutional:  [ ] fever [ ] chills  [ ] weight loss  [ ] weakness  Skin:  [ ] rash [ ] phlebitis	  Eyes: [ ] icterus [ ] pain  [ ] discharge	  ENMT: [ ] sore throat  [ ] thrush [ ] ulcers [ ] exudates  Respiratory: [ ] dyspnea [ ] hemoptysis [ ] cough [ ] sputum	  Cardiovascular:  [ ] chest pain [ ] palpitations [ ] edema	  Gastrointestinal:  [ ] nausea [ ] vomiting [ ] diarrhea [ ] constipation [ ] pain	  Genitourinary:  [ ] dysuria [ ] frequency [ ] hematuria [ ] discharge [ ] flank pain  [ ] incontinence  Musculoskeletal:  [ ] myalgias [ ] arthralgias [ ] arthritis  [ ] back pain  Neurological:  [ ] headache [ ] seizures  [ ] confusion/altered mental status    Allergies  codeine (Anaphylaxis)        ANTIMICROBIALS:  linezolid  IVPB 600 every 12 hours  nystatin    Suspension [id number] four times a day  trimethoprim   80 mG/sulfamethoxazole 400 mG 1 daily  valGANciclovir 900 daily      OTHER MEDS:  MEDICATIONS  (STANDING):  aspirin enteric coated 81 daily  cycloSPORINE  , modified (GENGRAF) 100 <User Schedule>  insulin glargine Injectable (LANTUS) 10 at bedtime  insulin lispro (HumaLOG) corrective regimen sliding scale  three times a day before meals  insulin lispro (HumaLOG) corrective regimen sliding scale  at bedtime  insulin lispro Injectable (HumaLOG) 4 three times a day before meals  melatonin 5 at bedtime  mycophenolate mofetil 750 <User Schedule>  pantoprazole    Tablet 40 before breakfast  predniSONE   Tablet 5 daily  QUEtiapine 25 <User Schedule>  tamsulosin 0.8 at bedtime      Vital Signs Last 24 Hrs  T(C): 36.9 (2020 13:00), Max: 36.9 (2020 13:00)  T(F): 98.4 (2020 13:00), Max: 98.4 (2020 13:00)  HR: 77 (2020 13:00) (77 - 94)  BP: 113/85 (2020 13:00) (113/85 - 137/65)  BP(mean): --  RR: 18 (2020 13:00) (18 - 20)  SpO2: 100% (2020 13:00) (92% - 100%)    PHYSICAL EXAMINATION:  General: Awake but not alert, +tremor  HEENT: PERRL, EOMI  Neck: Supple  Cardiac: RRR, No M/R/G  Resp: CTAB, No Wh/Rh/Ra  Abdomen: OLT staple line site is clean without surrounding erythema or drainage, No HSM, No rigidity or guarding  MSK: No LE edema. No Calf tenderness  : No bob  Skin: No rashes or lesions. Skin is warm and dry to the touch.   Neuro: Awake but not alert. CN 2-12 Grossly intact. Moves all four extremities spontaneously.  Psych: Encephalopathic - unable to assess                          9.2    3.66  )-----------( 106      ( 2020 05:36 )             28.5       07-20    135  |  102  |  37<H>  ----------------------------<  134<H>  5.4<H>   |  22  |  2.04<H>    Ca    9.1      2020 05:36  Phos  4.0       Mg     1.6         TPro  5.2<L>  /  Alb  3.0<L>  /  TBili  0.8  /  DBili  0.4<H>  /  AST  13  /  ALT  24  /  AlkPhos  125<H>        Urinalysis Basic - ( 2020 00:16 )    Color: Colorless / Appearance: Clear / S.009 / pH: x  Gluc: x / Ketone: Negative  / Bili: Negative / Urobili: Negative   Blood: x / Protein: Negative / Nitrite: Negative   Leuk Esterase: Negative / RBC: 25 /hpf / WBC 3 /HPF   Sq Epi: x / Non Sq Epi: 0 /hpf / Bacteria: Negative        MICROBIOLOGY:  v  .Body Fluid Abdominal Fluid  20   No growth at 5 days  --    polymorphonuclear leukocytes seen  No organisms seen  by cytocentrifuge      .Blood Blood-Peripheral  20   No Growth Final  --  --      .Urine Catheterized  20   No growth  --  --      .Blood Blood-Peripheral  07-10-20   Growth in aerobic bottle: Enterococcus faecium (vancomycin resistant)  "Due to technical problems, Proteus sp. will Not be reported as part of  the BCID panel until further notice"  ***Blood Panel PCR results on this specimen are available  approximately 3 hours after the Gram stain result.***  Gram stain, PCR, and/or culture results may not always  correspond due to difference in methodologies.  ************************************************************  This PCR assay was performed using [employer].  The following targets are tested for: Enterococcus,  vancomycin resistant enterococci, Listeria monocytogenes,  coagulase negative staphylococci, S. aureus,  methicillin resistant S. aureus, Streptococcus agalactiae  (Group B), S. pneumoniae, S. pyogenes (Group A),  Acinetobacter baumannii, Enterobacter cloacae, E. coli,  Klebsiella oxytoca, K. pneumoniae, Proteus sp.,  Serratia marcescens, Haemophilus influenzae,  Neisseria meningitidis, Pseudomonas aeruginosa, Candida  albicans, C. glabrata, C krusei, C parapsilosis,  C. tropicalis and the KPC resistance gene.  --  Blood Culture PCR  Enterococcus faecium (vancomycin resistant)      .Blood Blood  20   No Growth Final  --  --      .Urine Catheterized  20   No growth  --  --      .Other Other  20   No growth  --  --      .Blood Blood  20   No Growth Final  --  --      .Urine Clean Catch (Midstream)  20   <10,000 CFU/ml of other organisms  --  --      .Blood Blood-Peripheral  20   No Growth Final  --  --        CMV IgG Antibody: <0.20 U/mL (19 @ 08:33)  Toxoplasma IgG Screen: <3.0 IU/mL (19 @ 08:33)          RADIOLOGY:    <The imaging below has been reviewed and visualized by me independently. Findings as detailed in report below>    EXAM:  XR CHEST PORTABLE ROUTINE 1V                        PROCEDURE DATE:  2020    The heart size is unremarkable.  No pneumothorax. Right lower lung linear atelectasis. Degenerative changes of the spine.

## 2020-07-20 NOTE — PROGRESS NOTE ADULT - ATTENDING COMMENTS
s/p OLT, excellent liver function  MISA after diuresis yesterday- cr slightly down to 2 this morning. cont to monitor  Prograf held and transitioned to cyclosporine today for mental confusion and agitation  cont abx for VRE bacteremia  Immunosuppression - start cyclosporine 100mg bid; Cont prednisone 5mg daily, cellcept 750mg bid  OOB, aggressive PT/OT  AMS - cont seroquel; d/c zyprexa and remeron. possibly due to tacrolimus

## 2020-07-20 NOTE — PROGRESS NOTE ADULT - ASSESSMENT
64M w/ R posterior heel wound, stable  -Pt seen and evaluated  -R posterior heel wound to subQ stable w/ no signs of infection  -XR negative for gas or OM  -No podiatric surgical intervention, will continue to monitor for signs of infection and wound care recommendations  --continue with medihoney and dsd daily and decubitus precautions  - follow up as outpatient upon discharge (call [phone] for appointment)

## 2020-07-21 ENCOUNTER — TRANSCRIPTION ENCOUNTER (OUTPATIENT)
Age: 65
End: 2020-07-21

## 2020-07-21 DIAGNOSIS — G47.00 INSOMNIA, UNSPECIFIED: ICD-10-CM

## 2020-07-21 DIAGNOSIS — K72.90 HEPATIC FAILURE, UNSPECIFIED WITHOUT COMA: ICD-10-CM

## 2020-07-21 DIAGNOSIS — R45.1 RESTLESSNESS AND AGITATION: ICD-10-CM

## 2020-07-21 LAB
ALBUMIN SERPL ELPH-MCNC: 2.9 G/DL — LOW (ref 3.3–5)
ALP SERPL-CCNC: 117 U/L — SIGNIFICANT CHANGE UP (ref 40–120)
ALT FLD-CCNC: 21 U/L — SIGNIFICANT CHANGE UP (ref 10–45)
AMMONIA BLD-MCNC: 33 UMOL/L — SIGNIFICANT CHANGE UP (ref 11–55)
ANION GAP SERPL CALC-SCNC: 12 MMOL/L — SIGNIFICANT CHANGE UP (ref 5–17)
APTT BLD: 29.5 SEC — SIGNIFICANT CHANGE UP (ref 27.5–35.5)
AST SERPL-CCNC: 14 U/L — SIGNIFICANT CHANGE UP (ref 10–40)
BILIRUB DIRECT SERPL-MCNC: 0.3 MG/DL — HIGH (ref 0–0.2)
BILIRUB INDIRECT FLD-MCNC: 0.5 MG/DL — SIGNIFICANT CHANGE UP (ref 0.2–1)
BILIRUB SERPL-MCNC: 0.8 MG/DL — SIGNIFICANT CHANGE UP (ref 0.2–1.2)
BUN SERPL-MCNC: 34 MG/DL — HIGH (ref 7–23)
CALCIUM SERPL-MCNC: 9.1 MG/DL — SIGNIFICANT CHANGE UP (ref 8.4–10.5)
CALCIUM UR-MCNC: 4.4 MG/DL — SIGNIFICANT CHANGE UP
CHLORIDE SERPL-SCNC: 103 MMOL/L — SIGNIFICANT CHANGE UP (ref 96–108)
CO2 SERPL-SCNC: 22 MMOL/L — SIGNIFICANT CHANGE UP (ref 22–31)
CREAT ?TM UR-MCNC: 30 MG/DL — SIGNIFICANT CHANGE UP
CREAT SERPL-MCNC: 1.69 MG/DL — HIGH (ref 0.5–1.3)
CULTURE RESULTS: NO GROWTH — SIGNIFICANT CHANGE UP
CYCLOSPORINE SER-MCNC: 43 NG/ML — LOW (ref 150–400)
GLUCOSE BLDC GLUCOMTR-MCNC: 149 MG/DL — HIGH (ref 70–99)
GLUCOSE BLDC GLUCOMTR-MCNC: 161 MG/DL — HIGH (ref 70–99)
GLUCOSE BLDC GLUCOMTR-MCNC: 168 MG/DL — HIGH (ref 70–99)
GLUCOSE BLDC GLUCOMTR-MCNC: 170 MG/DL — HIGH (ref 70–99)
GLUCOSE SERPL-MCNC: 156 MG/DL — HIGH (ref 70–99)
HCT VFR BLD CALC: 27.9 % — LOW (ref 39–50)
HGB BLD-MCNC: 9.1 G/DL — LOW (ref 13–17)
INR BLD: 1.02 RATIO — SIGNIFICANT CHANGE UP (ref 0.88–1.16)
MAGNESIUM SERPL-MCNC: 1.6 MG/DL — SIGNIFICANT CHANGE UP (ref 1.6–2.6)
MCHC RBC-ENTMCNC: 32.4 PG — SIGNIFICANT CHANGE UP (ref 27–34)
MCHC RBC-ENTMCNC: 32.6 GM/DL — SIGNIFICANT CHANGE UP (ref 32–36)
MCV RBC AUTO: 99.3 FL — SIGNIFICANT CHANGE UP (ref 80–100)
MICROALBUMIN UR-MCNC: <1.2 MG/DL — SIGNIFICANT CHANGE UP
MICROALBUMIN/CREAT UR-RTO: SIGNIFICANT CHANGE UP MG/G (ref 0–30)
NRBC # BLD: 0 /100 WBCS — SIGNIFICANT CHANGE UP (ref 0–0)
PHOSPHATE SERPL-MCNC: 3.4 MG/DL — SIGNIFICANT CHANGE UP (ref 2.5–4.5)
PLATELET # BLD AUTO: 102 K/UL — LOW (ref 150–400)
POTASSIUM SERPL-MCNC: 5.3 MMOL/L — SIGNIFICANT CHANGE UP (ref 3.5–5.3)
POTASSIUM SERPL-SCNC: 5.3 MMOL/L — SIGNIFICANT CHANGE UP (ref 3.5–5.3)
PROCALCITONIN SERPL-MCNC: 0.38 NG/ML — HIGH (ref 0.02–0.1)
PROT SERPL-MCNC: 5.2 G/DL — LOW (ref 6–8.3)
PROTHROM AB SERPL-ACNC: 12.1 SEC — SIGNIFICANT CHANGE UP (ref 10.6–13.6)
RBC # BLD: 2.81 M/UL — LOW (ref 4.2–5.8)
RBC # FLD: 18.8 % — HIGH (ref 10.3–14.5)
SODIUM SERPL-SCNC: 137 MMOL/L — SIGNIFICANT CHANGE UP (ref 135–145)
SPECIMEN SOURCE: SIGNIFICANT CHANGE UP
TACROLIMUS SERPL-MCNC: 2.7 NG/ML — SIGNIFICANT CHANGE UP
UUN UR-MCNC: 319 MG/DL — SIGNIFICANT CHANGE UP
WBC # BLD: 3.7 K/UL — LOW (ref 3.8–10.5)
WBC # FLD AUTO: 3.7 K/UL — LOW (ref 3.8–10.5)

## 2020-07-21 PROCEDURE — 99232 SBSQ HOSP IP/OBS MODERATE 35: CPT | Mod: GC,24

## 2020-07-21 PROCEDURE — 99232 SBSQ HOSP IP/OBS MODERATE 35: CPT

## 2020-07-21 PROCEDURE — 99232 SBSQ HOSP IP/OBS MODERATE 35: CPT | Mod: GC

## 2020-07-21 RX ORDER — INSULIN LISPRO 100 U/ML
100 INJECTION, SOLUTION INTRAVENOUS; SUBCUTANEOUS
Refills: 0 | Status: DISCONTINUED | COMMUNITY
End: 2020-07-21

## 2020-07-21 RX ORDER — CYCLOSPORINE 100 MG/1
150 CAPSULE ORAL
Refills: 0 | Status: DISCONTINUED | OUTPATIENT
Start: 2020-07-21 | End: 2020-07-22

## 2020-07-21 RX ORDER — CYCLOSPORINE 25 MG/1
25 CAPSULE, GELATIN COATED ORAL TWICE DAILY
Qty: 180 | Refills: 3 | Status: DISCONTINUED | COMMUNITY
Start: 2020-07-21 | End: 2020-07-21

## 2020-07-21 RX ORDER — ONDANSETRON 4 MG/1
4 TABLET ORAL 3 TIMES DAILY
Qty: 45 | Refills: 1 | Status: DISCONTINUED | COMMUNITY
Start: 2020-06-10 | End: 2020-07-21

## 2020-07-21 RX ORDER — INSULIN GLARGINE 100 [IU]/ML
100 INJECTION, SOLUTION SUBCUTANEOUS AT BEDTIME
Refills: 0 | Status: DISCONTINUED | COMMUNITY
End: 2020-07-21

## 2020-07-21 RX ORDER — PRAVASTATIN SODIUM 20 MG/1
20 TABLET ORAL AT BEDTIME
Qty: 1 | Refills: 1 | Status: DISCONTINUED | COMMUNITY
End: 2020-07-21

## 2020-07-21 RX ORDER — FUROSEMIDE 40 MG/1
40 TABLET ORAL DAILY
Qty: 30 | Refills: 3 | Status: DISCONTINUED | COMMUNITY
Start: 2020-01-16 | End: 2020-07-21

## 2020-07-21 RX ORDER — CHLORHEXIDINE GLUCONATE 4 %
325 (65 FE) LIQUID (ML) TOPICAL DAILY
Qty: 30 | Refills: 1 | Status: DISCONTINUED | COMMUNITY
Start: 2020-01-21 | End: 2020-07-21

## 2020-07-21 RX ORDER — FOLIC ACID 1 MG/1
1 TABLET ORAL DAILY
Qty: 30 | Refills: 1 | Status: DISCONTINUED | COMMUNITY
Start: 2020-01-21 | End: 2020-07-21

## 2020-07-21 RX ORDER — LACTULOSE 10 G/15ML
10 SOLUTION ORAL 3 TIMES DAILY
Refills: 0 | Status: DISCONTINUED | COMMUNITY
End: 2020-07-21

## 2020-07-21 RX ORDER — MAGNESIUM SULFATE 500 MG/ML
2 VIAL (ML) INJECTION ONCE
Refills: 0 | Status: COMPLETED | OUTPATIENT
Start: 2020-07-21 | End: 2020-07-21

## 2020-07-21 RX ORDER — MIDODRINE HYDROCHLORIDE 10 MG/1
10 TABLET ORAL 3 TIMES DAILY
Qty: 270 | Refills: 5 | Status: DISCONTINUED | COMMUNITY
Start: 2020-01-21 | End: 2020-07-21

## 2020-07-21 RX ORDER — SULFAMETHOXAZOLE AND TRIMETHOPRIM 800; 160 MG/1; MG/1
800-160 TABLET ORAL
Qty: 14 | Refills: 0 | Status: DISCONTINUED | COMMUNITY
Start: 2020-04-14 | End: 2020-07-21

## 2020-07-21 RX ORDER — SPIRONOLACTONE 100 MG/1
100 TABLET ORAL DAILY
Qty: 30 | Refills: 1 | Status: DISCONTINUED | COMMUNITY
Start: 2020-01-16 | End: 2020-07-21

## 2020-07-21 RX ORDER — RIFAXIMIN 550 MG/1
550 TABLET ORAL TWICE DAILY
Qty: 60 | Refills: 5 | Status: DISCONTINUED | COMMUNITY
Start: 1900-01-01 | End: 2020-07-21

## 2020-07-21 RX ADMIN — Medication 1 TABLET(S): at 11:17

## 2020-07-21 RX ADMIN — Medication 100000 UNIT(S): at 17:04

## 2020-07-21 RX ADMIN — CYCLOSPORINE 100 MILLIGRAM(S): 100 CAPSULE ORAL at 09:23

## 2020-07-21 RX ADMIN — NYSTATIN CREAM 1 APPLICATION(S): 100000 CREAM TOPICAL at 17:04

## 2020-07-21 RX ADMIN — Medication 4 UNIT(S): at 08:25

## 2020-07-21 RX ADMIN — Medication 81 MILLIGRAM(S): at 11:17

## 2020-07-21 RX ADMIN — Medication 4 UNIT(S): at 12:27

## 2020-07-21 RX ADMIN — CHLORHEXIDINE GLUCONATE 1 APPLICATION(S): 213 SOLUTION TOPICAL at 08:24

## 2020-07-21 RX ADMIN — Medication 100000 UNIT(S): at 23:18

## 2020-07-21 RX ADMIN — Medication 100000 UNIT(S): at 06:28

## 2020-07-21 RX ADMIN — PANTOPRAZOLE SODIUM 40 MILLIGRAM(S): 20 TABLET, DELAYED RELEASE ORAL at 06:27

## 2020-07-21 RX ADMIN — VALGANCICLOVIR 900 MILLIGRAM(S): 450 TABLET, FILM COATED ORAL at 11:17

## 2020-07-21 RX ADMIN — Medication 100000 UNIT(S): at 00:43

## 2020-07-21 RX ADMIN — CYCLOSPORINE 150 MILLIGRAM(S): 100 CAPSULE ORAL at 20:14

## 2020-07-21 RX ADMIN — Medication 50 GRAM(S): at 08:23

## 2020-07-21 RX ADMIN — MYCOPHENOLATE MOFETIL 750 MILLIGRAM(S): 250 CAPSULE ORAL at 20:14

## 2020-07-21 RX ADMIN — Medication 1: at 17:03

## 2020-07-21 RX ADMIN — TAMSULOSIN HYDROCHLORIDE 0.8 MILLIGRAM(S): 0.4 CAPSULE ORAL at 21:58

## 2020-07-21 RX ADMIN — Medication 100000 UNIT(S): at 11:16

## 2020-07-21 RX ADMIN — MYCOPHENOLATE MOFETIL 750 MILLIGRAM(S): 250 CAPSULE ORAL at 09:23

## 2020-07-21 RX ADMIN — NYSTATIN CREAM 1 APPLICATION(S): 100000 CREAM TOPICAL at 06:27

## 2020-07-21 RX ADMIN — Medication 1: at 08:24

## 2020-07-21 RX ADMIN — Medication 1 APPLICATION(S): at 11:18

## 2020-07-21 RX ADMIN — Medication 4 UNIT(S): at 17:02

## 2020-07-21 NOTE — PROGRESS NOTE ADULT - SUBJECTIVE AND OBJECTIVE BOX
Follow Up:  s/p OLT, VRE Bacteremia    Interval History/ROS:Patient is a 64y old  Male who presents with a chief complaint of Liver failure, hepatic encephalopathy (2020 10:59)  - A&O*2, drowsy during lunch time  - UA, UCx negative on   - BCx received by labs on   - WBC=3.7, Cr=1.69 today    Allergies  codeine (Anaphylaxis)    ANTIMICROBIALS:    linezolid  IVPB 600 every 12 hours  nystatin    Suspension [id number] four times a day  trimethoprim   80 mG/sulfamethoxazole 400 mG 1 daily  valGANciclovir 900 daily    OTHER MEDS: MEDICATIONS  (STANDING):  aspirin enteric coated 81 daily  cycloSPORINE  , modified (GENGRAF) 100 <User Schedule>  insulin glargine Injectable (LANTUS) 10 at bedtime  insulin lispro (HumaLOG) corrective regimen sliding scale  three times a day before meals  insulin lispro (HumaLOG) corrective regimen sliding scale  at bedtime  insulin lispro Injectable (HumaLOG) 4 three times a day before meals  melatonin 3 at bedtime  mycophenolate mofetil 750 <User Schedule>  pantoprazole    Tablet 40 before breakfast  predniSONE   Tablet 5 daily  QUEtiapine 25 <User Schedule>  tamsulosin 0.8 at bedtime    Vital Signs Last 24 Hrs  T(F): 98.1 (20 @ 09:30), Max: 98.9 (20 @ 21:00)    Vital Signs Last 24 Hrs  HR: 72 (20 @ 09:30) (72 - 92)  BP: 144/75 (20 @ 09:30) (108/77 - 144/75)  RR: 19 (20 @ 09:30)  SpO2: 98% (20 @ 09:30) (94% - 100%)  Wt(kg): --    EXAM:  General: Awake but not alert, +tremor  HEENT: PERRL, EOMI  Neck: Supple  Cardiac: RRR, No M/R/G  Resp: CTAB, No Wh/Rh/Ra  Abdomen: OLT staple line site is clean without surrounding erythema or drainage, No HSM, No rigidity or guarding  MSK: No LE edema. No Calf tenderness  : No bob  Skin: No rashes or lesions. Skin is warm and dry to the touch.   Neuro: Awake but not alert. CN 2-12 Grossly intact. Moves all four extremities spontaneously.  Psych: Encephalopathic - unable to assess    Labs:                        9.1    3.70  )-----------( 102      ( 2020 06:00 )             27.9     -    137  |  103  |  34<H>  ----------------------------<  156<H>  5.3   |  22  |  1.69<H>    Ca    9.1      2020 06:00  Phos  3.4       Mg     1.6         TPro  5.2<L>  /  Alb  2.9<L>  /  TBili  0.8  /  DBili  0.3<H>  /  AST  14  /  ALT  21  /  AlkPhos  117        WBC Count: 3.70 (20 @ 06:00)  WBC Count: 3.66 (20 @ 05:36)  WBC Count: 4.06 (20 @ 05:52)  WBC Count: 4.13 (20 @ 06:12)  WBC Count: 4.75 (- @ 06:59)  WBC Count: 5.04 (20 @ 07:12)  WBC Count: 4.31 (07-15-20 @ 07:28)    Creatinine, Serum: 1.69 (-)  Creatinine, Serum: 2.04 (-20)  Creatinine, Serum: 2.35 (-19)  Creatinine, Serum: 1.65 (-19)  Creatinine, Serum: 1.82 (-19)  Creatinine, Serum: 1.52 (-18)  Creatinine, Serum: 1.42 (-17)  Creatinine, Serum: 1.40 (-16)  Creatinine, Serum: 1.30 (07-15)      Urinalysis Basic - ( 2020 00:16 )    Color: Colorless / Appearance: Clear / S.009 / pH: x  Gluc: x / Ketone: Negative  / Bili: Negative / Urobili: Negative   Blood: x / Protein: Negative / Nitrite: Negative   Leuk Esterase: Negative / RBC: 25 /hpf / WBC 3 /HPF   Sq Epi: x / Non Sq Epi: 0 /hpf / Bacteria: Negative      MICROBIOLOGY:    Culture - Urine (collected 2020 15:23)  Source: .Urine Clean Catch (Midstream)  Final Report (2020 13:20):    No growth    Culture - Body Fluid with Gram Stain (collected 2020 00:32)  Source: .Body Fluid Abdominal Fluid  Gram Stain (2020 04:12):    polymorphonuclear leukocytes seen    No organisms seen    by cytocentrifuge  Preliminary Report (2020 22:08):    No growth    Culture - Blood (collected 2020 10:04)  Source: .Blood Blood-Peripheral  Preliminary Report (2020 11:01):    No growth to date.    Culture - Blood (collected 2020 10:04)  Source: .Blood Blood-Peripheral  Preliminary Report (2020 11:01):    No growth to date.    Culture - Blood (collected 10 Jul 2020 00:22)  Source: .Blood Blood-Peripheral  Enterococcus faecium (vancomycin resistant) (2020 11:59)  Organism: Enterococcus faecium (vancomycin resistant) (2020 11:59)      -  Ampicillin: R >8 Predicts results to ampicillin/sulbactam, amoxacillin-clavulanate and  piperacillin-tazobactam.      -  Daptomycin: S 4      -  Gentamicin synergy: S      -  Linezolid: S 1      -  Streptomycin synergy: R      -  Vancomycin: R >16      RADIOLOGY:  imaging below personally reviewed    < from: Xray Chest 1 View- PORTABLE-Routine (20 @ 07:03) >  IMPRESSION:  The heart size is unremarkable.  No pneumothorax. Right lower lung linear atelectasis. Degenerative changes of the spine.  < end of copied text >      OTHER TESTS:  COVID-19 PCR: NotDetec (20 @ 17:00)  COVID-19 PCR: NotDetec (20 @ 14:59)  COVID-19 PCR: NotDetec (20 @ 15:23)  COVID-19 PCR: NotDetec (20 @ 17:01)  COVID-19 PCR: NotDetec (20 @ 19:32)  COVID-19 PCR: NotDetec (20 @ 14:52)      Procalcitonin, Serum: 0.38 ( @ 06:00)  Procalcitonin, Serum: 0.43 ( @ 05:36)  Procalcitonin, Serum: 0.48 ( @ 05:52)  Procalcitonin, Serum: 0.55 ( @ 06:12)  Procalcitonin, Serum: 0.67 ( @ 06:59)  Procalcitonin, Serum: 0.87 ( @ 07:12)  Procalcitonin, Serum: 0.71 (07-15 @ 07:27)  Procalcitonin, Serum: 0.68 ( @ 07:05)  Procalcitonin, Serum: 0.87 ( @ 05:25)  Procalcitonin, Serum: 1.10 ( @ 18:15)  Procalcitonin, Serum: 0.13 ( @ 03:24)  Procalcitonin, Serum: 0.12 ( @ 20:24)    Blood Gas Arterial, Lactate: 0.7 ( @ 04:40) Follow Up:  s/p OLT, VRE Bacteremia    Interval History/ROS:Patient is a 64y old  Male who presents with a chief complaint of Liver failure, hepatic encephalopathy (2020 10:59)  - Tacrolimus switched to Cyclosporine yesterday to avoid CNS toxicity and kidney injury  - A&O*2, drowsy during lunch time  - UA, UCx negative on   - BCx received by labs on   - WBC=3.7, Cr=1.69 today      Allergies  codeine (Anaphylaxis)    ANTIMICROBIALS:    linezolid  IVPB 600 every 12 hours  nystatin    Suspension [id number] four times a day  trimethoprim   80 mG/sulfamethoxazole 400 mG 1 daily  valGANciclovir 900 daily    OTHER MEDS: MEDICATIONS  (STANDING):  aspirin enteric coated 81 daily  cycloSPORINE  , modified (GENGRAF) 100 <User Schedule>  insulin glargine Injectable (LANTUS) 10 at bedtime  insulin lispro (HumaLOG) corrective regimen sliding scale  three times a day before meals  insulin lispro (HumaLOG) corrective regimen sliding scale  at bedtime  insulin lispro Injectable (HumaLOG) 4 three times a day before meals  melatonin 3 at bedtime  mycophenolate mofetil 750 <User Schedule>  pantoprazole    Tablet 40 before breakfast  predniSONE   Tablet 5 daily  QUEtiapine 25 <User Schedule>  tamsulosin 0.8 at bedtime    Vital Signs Last 24 Hrs  T(F): 98.1 (20 @ 09:30), Max: 98.9 (20 @ 21:00)    Vital Signs Last 24 Hrs  HR: 72 (20 @ 09:30) (72 - 92)  BP: 144/75 (20 @ 09:30) (108/77 - 144/75)  RR: 19 (20 @ 09:30)  SpO2: 98% (20 @ 09:30) (94% - 100%)  Wt(kg): --    EXAM:  General: Awake but not alert, +tremor  HEENT: PERRL, EOMI  Neck: Supple  Cardiac: RRR, No M/R/G  Resp: CTAB, No Wh/Rh/Ra  Abdomen: OLT staple line site is clean without surrounding erythema or drainage, No HSM, No rigidity or guarding  MSK: No LE edema. No Calf tenderness  : No bob  Skin: No rashes or lesions. Skin is warm and dry to the touch.   Neuro: Awake but not alert. CN 2-12 Grossly intact. Moves all four extremities spontaneously.  Psych: Encephalopathic - unable to assess    Labs:                        9.1    3.70  )-----------( 102      ( 2020 06:00 )             27.9         137  |  103  |  34<H>  ----------------------------<  156<H>  5.3   |  22  |  1.69<H>    Ca    9.1      2020 06:00  Phos  3.4       Mg     1.6         TPro  5.2<L>  /  Alb  2.9<L>  /  TBili  0.8  /  DBili  0.3<H>  /  AST  14  /  ALT  21  /  AlkPhos  117        WBC Count: 3.70 (20 @ 06:00)  WBC Count: 3.66 (20 @ 05:36)  WBC Count: 4.06 (20 @ 05:52)  WBC Count: 4.13 (20 @ 06:12)  WBC Count: 4.75 (20 @ 06:59)  WBC Count: 5.04 (20 @ 07:12)  WBC Count: 4.31 (07-15-20 @ 07:28)    Creatinine, Serum: 1.69 (-)  Creatinine, Serum: 2.04 (-20)  Creatinine, Serum: 2.35 (-)  Creatinine, Serum: 1.65 (-19)  Creatinine, Serum: 1.82 (-19)  Creatinine, Serum: 1.52 (-18)  Creatinine, Serum: 1.42 (-17)  Creatinine, Serum: 1.40 (-16)  Creatinine, Serum: 1.30 (-15)      Urinalysis Basic - ( 2020 00:16 )    Color: Colorless / Appearance: Clear / S.009 / pH: x  Gluc: x / Ketone: Negative  / Bili: Negative / Urobili: Negative   Blood: x / Protein: Negative / Nitrite: Negative   Leuk Esterase: Negative / RBC: 25 /hpf / WBC 3 /HPF   Sq Epi: x / Non Sq Epi: 0 /hpf / Bacteria: Negative      MICROBIOLOGY:    Culture - Urine (collected 2020 15:23)  Source: .Urine Clean Catch (Midstream)  Final Report (2020 13:20):    No growth    Culture - Body Fluid with Gram Stain (collected 2020 00:32)  Source: .Body Fluid Abdominal Fluid  Gram Stain (2020 04:12):    polymorphonuclear leukocytes seen    No organisms seen    by cytocentrifuge  Preliminary Report (2020 22:08):    No growth    Culture - Blood (collected 2020 10:04)  Source: .Blood Blood-Peripheral  Preliminary Report (2020 11:01):    No growth to date.    Culture - Blood (collected 2020 10:04)  Source: .Blood Blood-Peripheral  Preliminary Report (2020 11:01):    No growth to date.    Culture - Blood (collected 10 Jul 2020 00:22)  Source: .Blood Blood-Peripheral  Enterococcus faecium (vancomycin resistant) (2020 11:59)  Organism: Enterococcus faecium (vancomycin resistant) (2020 11:59)      -  Ampicillin: R >8 Predicts results to ampicillin/sulbactam, amoxacillin-clavulanate and  piperacillin-tazobactam.      -  Daptomycin: S 4      -  Gentamicin synergy: S      -  Linezolid: S 1      -  Streptomycin synergy: R      -  Vancomycin: R >16      RADIOLOGY:  imaging below personally reviewed    < from: Xray Chest 1 View- PORTABLE-Routine (20 @ 07:03) >  IMPRESSION:  The heart size is unremarkable.  No pneumothorax. Right lower lung linear atelectasis. Degenerative changes of the spine.  < end of copied text >      OTHER TESTS:  COVID-19 PCR: NotDetec (20 @ 17:00)  COVID-19 PCR: NotDetec (20 @ 14:59)  COVID-19 PCR: NotDetec (20 @ 15:23)  COVID-19 PCR: NotDetec (20 @ 17:01)  COVID-19 PCR: NotDetec (20 @ 19:32)  COVID-19 PCR: NotDetec (20 @ 14:52)      Procalcitonin, Serum: 0.38 ( @ 06:00)  Procalcitonin, Serum: 0.43 ( @ 05:36)  Procalcitonin, Serum: 0.48 ( @ 05:52)  Procalcitonin, Serum: 0.55 ( @ 06:12)  Procalcitonin, Serum: 0.67 ( @ 06:59)  Procalcitonin, Serum: 0.87 ( @ 07:12)  Procalcitonin, Serum: 0.71 (07-15 @ 07:27)  Procalcitonin, Serum: 0.68 ( @ 07:05)  Procalcitonin, Serum: 0.87 ( @ 05:25)  Procalcitonin, Serum: 1.10 ( @ 18:15)  Procalcitonin, Serum: 0.13 ( @ 03:24)  Procalcitonin, Serum: 0.12 ( @ 20:24)    Blood Gas Arterial, Lactate: 0.7 ( @ 04:40) Follow Up:  s/p OLT, VRE Bacteremia    Interval History/ROS:Patient is a 64y old  Male who presents with a chief complaint of Liver failure, hepatic encephalopathy (2020 10:59)  - Tacrolimus switched to Cyclosporine yesterday to avoid CNS toxicity and kidney injury  - A&O*2, drowsy during lunch time  - UA, UCx negative on   - BCx received by labs on   - WBC=3.7, Cr=1.69 today      Allergies  codeine (Anaphylaxis)    ANTIMICROBIALS:    linezolid  IVPB 600 every 12 hours  nystatin    Suspension [id number] four times a day  trimethoprim   80 mG/sulfamethoxazole 400 mG 1 daily  valGANciclovir 900 daily    OTHER MEDS: MEDICATIONS  (STANDING):  aspirin enteric coated 81 daily  cycloSPORINE  , modified (GENGRAF) 100 <User Schedule>  insulin glargine Injectable (LANTUS) 10 at bedtime  insulin lispro (HumaLOG) corrective regimen sliding scale  three times a day before meals  insulin lispro (HumaLOG) corrective regimen sliding scale  at bedtime  insulin lispro Injectable (HumaLOG) 4 three times a day before meals  melatonin 3 at bedtime  mycophenolate mofetil 750 <User Schedule>  pantoprazole    Tablet 40 before breakfast  predniSONE   Tablet 5 daily  QUEtiapine 25 <User Schedule>  tamsulosin 0.8 at bedtime    Vital Signs Last 24 Hrs  T(F): 98.1 (20 @ 09:30), Max: 98.9 (20 @ 21:00)    Vital Signs Last 24 Hrs  HR: 72 (20 @ 09:30) (72 - 92)  BP: 144/75 (20 @ 09:30) (108/77 - 144/75)  RR: 19 (20 @ 09:30)  SpO2: 98% (20 @ 09:30) (94% - 100%)  Wt(kg): --    EXAM:  General: Awake but not alert, +tremor  HEENT: PERRL, EOMI  Neck: Supple  Cardiac: RRR, No M/R/G  Resp: CTAB, No Wh/Rh/Ra  Abdomen: OLT staple line site is clean without surrounding erythema or drainage, No HSM, No rigidity or guarding  MSK: No LE edema. No Calf tenderness  : No bob  Skin: No rashes or lesions. Skin is warm and dry to the touch.   Neuro: Awake but not alert. CN 2-12 Grossly intact. Moves all four extremities spontaneously.  Psych: Encephalopathic - unable to assess    Labs:                        9.1    3.70  )-----------( 102      ( 2020 06:00 )             27.9         137  |  103  |  34<H>  ----------------------------<  156<H>  5.3   |  22  |  1.69<H>    Ca    9.1      2020 06:00  Phos  3.4       Mg     1.6         TPro  5.2<L>  /  Alb  2.9<L>  /  TBili  0.8  /  DBili  0.3<H>  /  AST  14  /  ALT  21  /  AlkPhos  117        WBC Count: 3.70 (20 @ 06:00)  WBC Count: 3.66 (20 @ 05:36)  WBC Count: 4.06 (20 @ 05:52)  WBC Count: 4.13 (20 @ 06:12)  WBC Count: 4.75 (20 @ 06:59)  WBC Count: 5.04 (20 @ 07:12)  WBC Count: 4.31 (07-15-20 @ 07:28)    Creatinine, Serum: 1.69 (-)  Creatinine, Serum: 2.04 (-20)  Creatinine, Serum: 2.35 (-)  Creatinine, Serum: 1.65 (-19)  Creatinine, Serum: 1.82 (-19)  Creatinine, Serum: 1.52 (-18)  Creatinine, Serum: 1.42 (-17)  Creatinine, Serum: 1.40 (-16)  Creatinine, Serum: 1.30 (-15)      Urinalysis Basic - ( 2020 00:16 )    Color: Colorless / Appearance: Clear / S.009 / pH: x  Gluc: x / Ketone: Negative  / Bili: Negative / Urobili: Negative   Blood: x / Protein: Negative / Nitrite: Negative   Leuk Esterase: Negative / RBC: 25 /hpf / WBC 3 /HPF   Sq Epi: x / Non Sq Epi: 0 /hpf / Bacteria: Negative      MICROBIOLOGY:    Culture - Urine (collected 2020 15:23)  Source: .Urine Clean Catch (Midstream)  Final Report (2020 13:20):    No growth    Culture - Body Fluid with Gram Stain (collected 2020 00:32)  Source: .Body Fluid Abdominal Fluid  Gram Stain (2020 04:12):    polymorphonuclear leukocytes seen    No organisms seen    by cytocentrifuge  Preliminary Report (2020 22:08):    No growth    Culture - Blood (collected 2020 10:04)  Source: .Blood Blood-Peripheral  Preliminary Report (2020 11:01):    No growth to date.    Culture - Blood (collected 2020 10:04)  Source: .Blood Blood-Peripheral  Preliminary Report (2020 11:01):    No growth to date.    Culture - Blood (collected 10 Jul 2020 00:22)  Source: .Blood Blood-Peripheral  Enterococcus faecium (vancomycin resistant) (2020 11:59)  Organism: Enterococcus faecium (vancomycin resistant) (2020 11:59)      -  Ampicillin: R >8 Predicts results to ampicillin/sulbactam, amoxacillin-clavulanate and  piperacillin-tazobactam.      -  Daptomycin: S 4      -  Gentamicin synergy: S      -  Linezolid: S 1      -  Streptomycin synergy: R      -  Vancomycin: R >16      RADIOLOGY:    <The imaging below has been reviewed and visualized by me independently. Findings as detailed in report below>    < from: Xray Chest 1 View- PORTABLE-Routine (20 @ 07:03) >  IMPRESSION:  The heart size is unremarkable.  No pneumothorax. Right lower lung linear atelectasis. Degenerative changes of the spine.  < end of copied text >      OTHER TESTS:  COVID-19 PCR: NotDetec (20 @ 17:00)  COVID-19 PCR: NotDetec (20 @ 14:59)  COVID-19 PCR: NotDetec (20 @ 15:23)  COVID-19 PCR: NotDetec (20 @ 17:01)  COVID-19 PCR: NotDetec (20 @ 19:32)  COVID-19 PCR: NotDetec (20 @ 14:52)      Procalcitonin, Serum: 0.38 ( @ 06:00)  Procalcitonin, Serum: 0.43 ( @ 05:36)  Procalcitonin, Serum: 0.48 ( @ 05:52)  Procalcitonin, Serum: 0.55 ( @ 06:12)  Procalcitonin, Serum: 0.67 ( @ 06:59)  Procalcitonin, Serum: 0.87 ( @ 07:12)  Procalcitonin, Serum: 0.71 (07-15 @ 07:27)  Procalcitonin, Serum: 0.68 ( @ 07:05)  Procalcitonin, Serum: 0.87 ( @ 05:25)  Procalcitonin, Serum: 1.10 ( @ 18:15)  Procalcitonin, Serum: 0.13 ( @ 03:24)  Procalcitonin, Serum: 0.12 ( @ 20:24)    Blood Gas Arterial, Lactate: 0.7 ( @ 04:40)

## 2020-07-21 NOTE — PROGRESS NOTE ADULT - SUBJECTIVE AND OBJECTIVE BOX
Chief Complaint/Follow-up on:     Subjective:    MEDICATIONS  (STANDING):  aspirin enteric coated 81 milliGRAM(s) Oral daily  chlorhexidine 2% Cloths 1 Application(s) Topical <User Schedule>  collagenase Ointment 1 Application(s) Topical daily  cycloSPORINE  , modified (GENGRAF) 100 milliGRAM(s) Oral <User Schedule>  insulin glargine Injectable (LANTUS) 10 Unit(s) SubCutaneous at bedtime  insulin lispro (HumaLOG) corrective regimen sliding scale   SubCutaneous three times a day before meals  insulin lispro (HumaLOG) corrective regimen sliding scale   SubCutaneous at bedtime  insulin lispro Injectable (HumaLOG) 4 Unit(s) SubCutaneous three times a day before meals  linezolid  IVPB 600 milliGRAM(s) IV Intermittent every 12 hours  magnesium oxide 800 milliGRAM(s) Oral two times a day with meals  melatonin 3 milliGRAM(s) Oral at bedtime  mycophenolate mofetil 750 milliGRAM(s) Oral <User Schedule>  nystatin    Suspension [id number] Unit(s) Swish and Swallow four times a day  nystatin Cream 1 Application(s) Topical two times a day  pantoprazole    Tablet 40 milliGRAM(s) Oral before breakfast  predniSONE   Tablet 5 milliGRAM(s) Oral daily  QUEtiapine 25 milliGRAM(s) Oral <User Schedule>  tamsulosin 0.8 milliGRAM(s) Oral at bedtime  trimethoprim   80 mG/sulfamethoxazole 400 mG 1 Tablet(s) Oral daily  valGANciclovir 900 milliGRAM(s) Oral daily    MEDICATIONS  (PRN):      PHYSICAL EXAM:  VITALS: T(C): 36.4 (07-21-20 @ 14:00)  T(F): 97.6 (07-21-20 @ 14:00), Max: 98.9 (07-20-20 @ 21:00)  HR: 80 (07-21-20 @ 14:00) (72 - 92)  BP: 113/78 (07-21-20 @ 14:00) (108/77 - 144/75)  RR:  (17 - 19)  SpO2:  (94% - 100%)  Wt(kg): --  GENERAL: NAD, well-groomed, well-developed  EYES: No proptosis, no injection  HEENT:  Atraumatic, Normocephalic, moist mucous membranes  THYROID: Normal size, no palpable nodules  RESPIRATORY: Clear to auscultation bilaterally; No rales, rhonchi, wheezing, or rubs  CARDIOVASCULAR: Regular rate and rhythm; No murmurs; no peripheral edema  GI: Soft, nontender, non distended, normal bowel sounds  CUSHING'S SIGNS: no striae    POCT Blood Glucose.: 149 mg/dL (07-21-20 @ 12:22)  POCT Blood Glucose.: 168 mg/dL (07-21-20 @ 08:21)  POCT Blood Glucose.: 170 mg/dL (07-20-20 @ 21:27)  POCT Blood Glucose.: 110 mg/dL (07-20-20 @ 17:46)  POCT Blood Glucose.: 148 mg/dL (07-20-20 @ 13:04)  POCT Blood Glucose.: 133 mg/dL (07-20-20 @ 08:11)  POCT Blood Glucose.: 92 mg/dL (07-19-20 @ 22:11)  POCT Blood Glucose.: 76 mg/dL (07-19-20 @ 21:37)  POCT Blood Glucose.: 157 mg/dL (07-19-20 @ 17:11)  POCT Blood Glucose.: 101 mg/dL (07-19-20 @ 14:26)  POCT Blood Glucose.: 70 mg/dL (07-19-20 @ 12:46)  POCT Blood Glucose.: 69 mg/dL (07-19-20 @ 12:44)  POCT Blood Glucose.: 135 mg/dL (07-19-20 @ 08:21)  POCT Blood Glucose.: 180 mg/dL (07-18-20 @ 21:22)  POCT Blood Glucose.: 191 mg/dL (07-18-20 @ 18:23)    07-21    137  |  103  |  34<H>  ----------------------------<  156<H>  5.3   |  22  |  1.69<H>    EGFR if : 49<L>  EGFR if non : 42<L>    Ca    9.1      07-21  Mg     1.6     07-21  Phos  3.4     07-21    TPro  5.2<L>  /  Alb  2.9<L>  /  TBili  0.8  /  DBili  0.3<H>  /  AST  14  /  ALT  21  /  AlkPhos  117  07-21          Thyroid Function Tests: Chief Complaint/Follow-up on: T2D    Subjective: Patient with great appetite and po intake. His mental status has also improved today due to changes in his medications per RN.   He was cracking a few jokes with the staff, saying thanks girls. When he was reminded that his PCA was a boy he laughed.     MEDICATIONS  (STANDING):  aspirin enteric coated 81 milliGRAM(s) Oral daily  chlorhexidine 2% Cloths 1 Application(s) Topical <User Schedule>  collagenase Ointment 1 Application(s) Topical daily  cycloSPORINE  , modified (GENGRAF) 100 milliGRAM(s) Oral <User Schedule>  insulin glargine Injectable (LANTUS) 10 Unit(s) SubCutaneous at bedtime  insulin lispro (HumaLOG) corrective regimen sliding scale   SubCutaneous three times a day before meals  insulin lispro (HumaLOG) corrective regimen sliding scale   SubCutaneous at bedtime  insulin lispro Injectable (HumaLOG) 4 Unit(s) SubCutaneous three times a day before meals  linezolid  IVPB 600 milliGRAM(s) IV Intermittent every 12 hours  magnesium oxide 800 milliGRAM(s) Oral two times a day with meals  melatonin 3 milliGRAM(s) Oral at bedtime  mycophenolate mofetil 750 milliGRAM(s) Oral <User Schedule>  nystatin    Suspension [id number] Unit(s) Swish and Swallow four times a day  nystatin Cream 1 Application(s) Topical two times a day  pantoprazole    Tablet 40 milliGRAM(s) Oral before breakfast  predniSONE   Tablet 5 milliGRAM(s) Oral daily  QUEtiapine 25 milliGRAM(s) Oral <User Schedule>  tamsulosin 0.8 milliGRAM(s) Oral at bedtime  trimethoprim   80 mG/sulfamethoxazole 400 mG 1 Tablet(s) Oral daily  valGANciclovir 900 milliGRAM(s) Oral daily    MEDICATIONS  (PRN):      PHYSICAL EXAM:  VITALS: T(C): 36.4 (07-21-20 @ 14:00)  T(F): 97.6 (07-21-20 @ 14:00), Max: 98.9 (07-20-20 @ 21:00)  HR: 80 (07-21-20 @ 14:00) (72 - 92)  BP: 113/78 (07-21-20 @ 14:00) (108/77 - 144/75)  RR:  (17 - 19)  SpO2:  (94% - 100%)  Wt(kg): --  GENERAL: NAD, well-groomed, well-developed  EYES: No proptosis, no injection  RESPIRATORY: Clear to auscultation bilaterally; No rales, rhonchi, wheezing, or rubs  CARDIOVASCULAR: Regular rate and rhythm; No murmurs; no peripheral edema  GI: Soft, nontender, non distended, normal bowel sounds      POCT Blood Glucose.: 149 mg/dL (07-21-20 @ 12:22)  POCT Blood Glucose.: 168 mg/dL (07-21-20 @ 08:21)  POCT Blood Glucose.: 170 mg/dL (07-20-20 @ 21:27)  POCT Blood Glucose.: 110 mg/dL (07-20-20 @ 17:46)  POCT Blood Glucose.: 148 mg/dL (07-20-20 @ 13:04)  POCT Blood Glucose.: 133 mg/dL (07-20-20 @ 08:11)  POCT Blood Glucose.: 92 mg/dL (07-19-20 @ 22:11)  POCT Blood Glucose.: 76 mg/dL (07-19-20 @ 21:37)  POCT Blood Glucose.: 157 mg/dL (07-19-20 @ 17:11)  POCT Blood Glucose.: 101 mg/dL (07-19-20 @ 14:26)  POCT Blood Glucose.: 70 mg/dL (07-19-20 @ 12:46)  POCT Blood Glucose.: 69 mg/dL (07-19-20 @ 12:44)  POCT Blood Glucose.: 135 mg/dL (07-19-20 @ 08:21)  POCT Blood Glucose.: 180 mg/dL (07-18-20 @ 21:22)  POCT Blood Glucose.: 191 mg/dL (07-18-20 @ 18:23)    07-21    137  |  103  |  34<H>  ----------------------------<  156<H>  5.3   |  22  |  1.69<H>    EGFR if : 49<L>  EGFR if non : 42<L>    Ca    9.1      07-21  Mg     1.6     07-21  Phos  3.4     07-21    TPro  5.2<L>  /  Alb  2.9<L>  /  TBili  0.8  /  DBili  0.3<H>  /  AST  14  /  ALT  21  /  AlkPhos  117  07-21

## 2020-07-21 NOTE — PROGRESS NOTE ADULT - SUBJECTIVE AND OBJECTIVE BOX
INTERVAL HPI/OVERNIGHT EVENTS:    events noted  tolerating diet  having bowel movements  remains confused        MEDICATIONS  (STANDING):  aspirin enteric coated 81 milliGRAM(s) Oral daily  chlorhexidine 2% Cloths 1 Application(s) Topical <User Schedule>  collagenase Ointment 1 Application(s) Topical daily  furosemide    Tablet 80 milliGRAM(s) Oral every 8 hours  insulin glargine Injectable (LANTUS) 16 Unit(s) SubCutaneous at bedtime  insulin lispro (HumaLOG) corrective regimen sliding scale   SubCutaneous three times a day before meals  insulin lispro (HumaLOG) corrective regimen sliding scale   SubCutaneous at bedtime  insulin lispro Injectable (HumaLOG) 10 Unit(s) SubCutaneous before dinner  insulin lispro Injectable (HumaLOG) 8 Unit(s) SubCutaneous before breakfast  insulin lispro Injectable (HumaLOG) 8 Unit(s) SubCutaneous before lunch  linezolid  IVPB 600 milliGRAM(s) IV Intermittent every 12 hours  magnesium oxide 800 milliGRAM(s) Oral two times a day with meals  meropenem  IVPB 1000 milliGRAM(s) IV Intermittent every 8 hours  nystatin    Suspension 834012 Unit(s) Swish and Swallow four times a day  nystatin Cream 1 Application(s) Topical two times a day  pantoprazole  Injectable 40 milliGRAM(s) IV Push every 12 hours  polyethylene glycol 3350 17 Gram(s) Oral daily  predniSONE   Tablet 5 milliGRAM(s) Oral daily  senna 2 Tablet(s) Oral at bedtime  tacrolimus 3 milliGRAM(s) Oral <User Schedule>  tacrolimus 2 milliGRAM(s) Oral <User Schedule>  tamsulosin 0.8 milliGRAM(s) Oral at bedtime  trimethoprim   80 mG/sulfamethoxazole 400 mG 1 Tablet(s) Oral daily  valGANciclovir 450 milliGRAM(s) Oral daily    MEDICATIONS  (PRN):  glucagon  Injectable 1 milliGRAM(s) IntraMuscular once PRN Glucose <70 milliGRAM(s)/deciLiter      Allergies    codeine (Anaphylaxis)    Intolerances        Review of Systems:    General:  No wt loss, fevers, chills, night sweats,fatigue,   Eyes:  Good vision, no reported pain  ENT:  No sore throat, pain, runny nose, dysphagia  CV:  No pain, palpitatioins, hypo/hypertension  Resp:  No dyspnea, cough, tachypnea, wheezing  GI:  No pain, No nausea, No vomiting, No diarrhea, No constipatiion, No weight loss, No fever, No pruritis, No rectal bleeding, No tarry stools, No dysphagia,  :  No pain, bleeding, incontinence, nocturia  Muscle:  No pain, weakness  Neuro:  No weakness, tingling, memory problems  Psych:  No fatigue, insomnia, mood problems, depression  Endocrine:  No polyuria, polydypsia, cold/heat intolerance  Heme:  No petechiae, ecchymosis, easy bruisability  Skin:  No rash, tattoos, scars, edema      Vital Signs Last 24 Hrs  T(C): 36.3 (12 Jul 2020 15:00), Max: 36.8 (11 Jul 2020 19:00)  T(F): 97.3 (12 Jul 2020 15:00), Max: 98.2 (11 Jul 2020 19:00)  HR: 80 (12 Jul 2020 15:00) (72 - 83)  BP: 109/59 (12 Jul 2020 15:00) (94/55 - 154/68)  BP(mean): 78 (12 Jul 2020 15:00) (70 - 125)  RR: 15 (12 Jul 2020 15:00) (9 - 28)  SpO2: 96% (12 Jul 2020 15:00) (96% - 100%)    PHYSICAL EXAM:    Constitutional: NAD  HEENT: sclera anicteric   Neck: No LAD, supple  Gastrointestinal: BS+, soft, ND, +incisional tenderness, incisions c/d/i  Extremities: +b/l peripheral edema  Neurological: awake, alert, +tremors - improving     LABS:                        9.3    5.51  )-----------( 98       ( 12 Jul 2020 01:54 )             28.6     07-12    131<L>  |  95<L>  |  31<H>  ----------------------------<  217<H>  5.0   |  27  |  1.26    Ca    8.1<L>      12 Jul 2020 12:22  Phos  4.0     07-12  Mg     2.0     07-12    TPro  4.4<L>  /  Alb  2.6<L>  /  TBili  1.1  /  DBili  0.5<H>  /  AST  64<H>  /  ALT  98<H>  /  AlkPhos  212<H>  07-12    PT/INR - ( 12 Jul 2020 01:54 )   PT: 12.7 sec;   INR: 1.12 ratio         PTT - ( 12 Jul 2020 01:54 )  PTT:28.7 sec      RADIOLOGY & ADDITIONAL TESTS:

## 2020-07-21 NOTE — DISCHARGE NOTE PROVIDER - NSDCHHNEEDSERVICE_GEN_ALL_CORE
Medication teaching and assessment/Observation and assessment/Other, specify.../Rehabilitation services/Teaching and training

## 2020-07-21 NOTE — DISCHARGE NOTE PROVIDER - NSDCACTIVITY_GEN_ALL_CORE
Do not drive or operate machinery/Walking - Outdoors allowed/Walking - Indoors allowed/No heavy lifting/straining/Showering allowed/Do not make important decisions

## 2020-07-21 NOTE — DISCHARGE NOTE PROVIDER - NSDCMRMEDTOKEN_GEN_ALL_CORE_FT
Aldactone 100 mg oral tablet: 1 tab(s) orally once a day  Bactrim  mg-160 mg oral tablet: 1 tab(s) orally once a day  Basaglar KwikPen 100 units/mL subcutaneous solution: 4 unit(s) subcutaneous once a day (at bedtime)  folic acid 1 mg oral tablet: 1 tab(s) orally once a day  furosemide 20 mg oral tablet: 1 tab(s) orally once a day  HumaLOG KwikPen 100 units/mL injectable solution: 3 unit(s) injectable 3 times a day (before meals)  lactulose 10 g/15 mL oral syrup: 30 milliliter(s) orally every 8 hours. Titrate for 3-4 BM daily   midodrine 10 mg oral tablet: 2 tab(s) orally 3 times a day  nystatin 100,000 units/g topical cream: Apply topically to affected area 2 times a day  pantoprazole 40 mg oral delayed release tablet: 1 tab(s) orally once a day (before a meal)  rifAXIMin 550 mg oral tablet: 1 tab(s) orally 2 times a day  silver sulfADIAZINE 1% topical cream: Apply topically to affected area every other day to right heel ulcer   tamsulosin 0.4 mg oral capsule: 1 cap(s) orally once a day (at bedtime)  traMADol 50 mg oral tablet: 0.5 tab(s) orally 2 times a day aspirin 81 mg oral delayed release tablet: 1 tab(s) orally once a day  collagenase 250 units/g topical ointment: 1 application topically once a day  diphenhydrAMINE 25 mg oral capsule: 1 cap(s) orally , As needed, Insomnia  everolimus 0.75 mg oral tablet: 6 tab(s) orally   HumaLOG KwikPen 100 units/mL injectable solution: 6 unit(s) injectable 3 times a day (before meals)  insulin glargine: 6 unit(s) subcutaneous once a day (at bedtime)  magnesium oxide 400 mg (241.3 mg elemental magnesium) oral tablet: 2 tab(s) orally 2 times a day (with meals)  mycophenolate mofetil 250 mg oral capsule: 2 cap(s) orally 2 times a day  nystatin 100,000 units/g topical cream: 1 application topically 2 times a day  nystatin 100,000 units/mL oral suspension: 5 milliliter(s) orally 4 times a day  pantoprazole 40 mg oral delayed release tablet: 1 tab(s) orally once a day (before a meal)  predniSONE 5 mg oral tablet: 1 tab(s) orally once a day  sulfamethoxazole-trimethoprim 400 mg-80 mg oral tablet: 1 tab(s) orally once a day  tamsulosin 0.4 mg oral capsule: 2 cap(s) orally once a day (at bedtime)  valGANciclovir 450 mg oral tablet: 2 tab(s) orally once a day

## 2020-07-21 NOTE — DISCHARGE NOTE PROVIDER - NSDCCPGOAL_GEN_ALL_CORE_FT
To get better and follow your care plan as instructed. To get better and remain free of infection

## 2020-07-21 NOTE — ED PROVIDER NOTE - CONSTITUTIONAL, MLM
[facility], [place]    Central line    Date/Time: 7/21/2020 8:18 AM  Performed by: Won Mishra DO  Authorized by: Mychal Greer MD   Indications: vascular access    UNIVERSAL PROTOCOL   Site Marked: NA  Prior Images Obtained and Reviewed:  Yes  Required items: Required blood products, implants, devices and special equipment available    Patient identity confirmed:  Arm band  Patient was reevaluated immediately before administering moderate or deep sedation or anesthesia  Confirmation Checklist:  Patient's identity using two indicators, relevant allergies, procedure was appropriate and matched the consent or emergent situation and correct equipment/implants were available  Time out: Immediately prior to the procedure a time out was called    Universal Protocol: the Joint Commission Universal Protocol was followed    Preparation: Patient was prepped and draped in usual sterile fashion    ESBL (mL):  8        SEDATION    Patient Sedated: Yes    Sedation Type:  Moderate (conscious) sedation  Sedation:  Fentanyl and see MAR for details  Vital signs: Vital signs monitored during sedation      Preparation: skin prepped with 2% chlorhexidine  Skin prep agent dried: skin prep agent completely dried prior to procedure  Sterile barriers: all five maximum sterile barriers used - cap, mask, sterile gown, sterile gloves, and large sterile sheet  Hand hygiene: hand hygiene performed prior to central venous catheter insertion  Location details: right femoral  Site selection rationale: Other access points inaccessable   Patient position: flat  Catheter type: double lumen  Catheter size: 12 Fr  Pre-procedure: landmarks identified  Ultrasound guidance: yes  Sterile ultrasound techniques: sterile gel and sterile probe covers were used  Number of attempts: 1  Successful placement: yes  Post-procedure: line sutured  Assessment: blood return through all ports    PROCEDURE   Patient  Tolerance:  Patient tolerated the procedure well with no immediate complications  Describe Procedure: Right femoral hemodialysis catheter placement for CRRT.  Indication: Renal failure in setting of acute liver injury progressing to acute liver failure.  Multifactorial shock.   Length of time physician/provider present for 1:1 monitoring during sedation: 30         Well appearing, well nourished, awake, alert, oriented to person, place, time/situation and appears nauseated but not toxic appearing normal...

## 2020-07-21 NOTE — PROGRESS NOTE ADULT - ATTENDING COMMENTS
Mental status improved.  Cr downtrending.  Good allograft function.   Immuno: Neoral (goal 150-200), Cellcept 750mg BID, Pred  Goal d/c this week.

## 2020-07-21 NOTE — PROGRESS NOTE ADULT - ATTENDING COMMENTS
64M PMH JEAN cirrhosis s/p OLT on 7/2/20  Post-Op Course Complicated by Encephalopathy and Tremors  Blood Cultures (7/10) with VRE  CT C/A/P (7/11) without obvious intraabdominal source.  Hypoxia prior to positive BCx which may be related to sepsis   Had L groin fluid collection which appears to be consistent with seroma - sample obtained for culture  Would continue Linezolid for 2 weeks from negative BCx  TTE limited but without obvious vegetations    Continued encephalopathy and tremor (?Tacrolimus-induced)  U/A from 7/20 without pyuria  Blood cultures sent and pending    CMV D+/R- (high risk) - increased to Valcyte 900 mg PO Q24H    I will continue to follow. Please feel free to contact me with any further questions.    Eusebio Pérez M.D.  [facility] Division of Infectious Disease  8AM-5PM: Pager Number [phone]  After Hours (or if no response): Please contact the Infectious Diseases Office at [phone]

## 2020-07-21 NOTE — PROGRESS NOTE ADULT - ASSESSMENT
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    [] POD 19 s/p OLT   - Good graft function, LFTs stable   - Liver doppler (7/12) with patent vessels  - Immuno: tacro stopped yesterday, started on CYCLOSPORINE 100mg BID, continue with MMF 750mg bid; Pred 5mg daily  - PPX: valcyte/nystatin/bactrim  - Continue ASA 81mg daily  - Electrolytes: low K diet, keep Mag >2, continue PO supplementation  - h/o UGI bleed: Protonix 40mg daily   - Pain control PRN  - psych recs appreciated. Remeron and Zyprexa D/C ed yesterday given periods of lethargy, continue Seroquel.  immunosuppression adjusted; AMS likely related to FK intolerance  - DVT PPx: SCDs at all times    [] VRE bacteremia (7/10)  - ID following: on linezolid  - Bld cx from 7/12 with NG, L groin collection aspirated (7/13) -NG    [] Hyperglycemia  - Appreciate endocrinology recommendations  - Cont Lantus and Humalog insulin wih SSI coverage  - Fingersticks ac and qhs    [] Heel ulcer  Continue local wound care w/ santyl and DSD as per podiatry  -  [] Dispo:   - continue 1:1 with tele  - F/U PT recs for possible Discharge to acute vs [facility]  - SW with tx team working with pt & family on DC plan

## 2020-07-21 NOTE — PROGRESS NOTE ADULT - SUBJECTIVE AND OBJECTIVE BOX
Patient seen and examined.  Lethargic.  Max A bed mob with PT.    MEDICATIONS  (STANDING):  aspirin enteric coated 81 milliGRAM(s) Oral daily  chlorhexidine 2% Cloths 1 Application(s) Topical <User Schedule>  collagenase Ointment 1 Application(s) Topical daily  cycloSPORINE  , modified (GENGRAF) 100 milliGRAM(s) Oral <User Schedule>  insulin glargine Injectable (LANTUS) 10 Unit(s) SubCutaneous at bedtime  insulin lispro (HumaLOG) corrective regimen sliding scale   SubCutaneous three times a day before meals  insulin lispro (HumaLOG) corrective regimen sliding scale   SubCutaneous at bedtime  insulin lispro Injectable (HumaLOG) 4 Unit(s) SubCutaneous three times a day before meals  linezolid  IVPB 600 milliGRAM(s) IV Intermittent every 12 hours  magnesium oxide 800 milliGRAM(s) Oral two times a day with meals  melatonin 3 milliGRAM(s) Oral at bedtime  mycophenolate mofetil 750 milliGRAM(s) Oral <User Schedule>  nystatin    Suspension 032887 Unit(s) Swish and Swallow four times a day  nystatin Cream 1 Application(s) Topical two times a day  pantoprazole    Tablet 40 milliGRAM(s) Oral before breakfast  predniSONE   Tablet 5 milliGRAM(s) Oral daily  QUEtiapine 25 milliGRAM(s) Oral <User Schedule>  tamsulosin 0.8 milliGRAM(s) Oral at bedtime  trimethoprim   80 mG/sulfamethoxazole 400 mG 1 Tablet(s) Oral daily  valGANciclovir 900 milliGRAM(s) Oral daily    MEDICATIONS  (PRN):    Vital Signs Last 24 Hrs  T(C): 36.7 (21 Jul 2020 09:30), Max: 37.2 (20 Jul 2020 21:00)  T(F): 98.1 (21 Jul 2020 09:30), Max: 98.9 (20 Jul 2020 21:00)  HR: 72 (21 Jul 2020 09:30) (72 - 92)  BP: 144/75 (21 Jul 2020 09:30) (108/77 - 144/75)  BP(mean): 92 (20 Jul 2020 17:00) (92 - 92)  RR: 19 (21 Jul 2020 09:30) (18 - 19)  SpO2: 98% (21 Jul 2020 09:30) (94% - 100%)    PHYSICAL EXAM  Constitutional: NAD, Comfortable  HEENT: NCAT, EOMI  Neck: Supple, No limited ROM  Chest: Breathing comfortably, No wheezing  Cardiovascular: S1S2   Abdomen: Soft   Extremities: 1+ LE edema, no calf pain.   Neurologic Exam:                   Lethargic, briefly arousable.     Follows some verbal instruction.    TORREZ x 4 antigravity.  Speech hypophonic and not intelligible.                          9.1    3.70  )-----------( 102      ( 21 Jul 2020 06:00 )             27.9     07-21    137  |  103  |  34<H>  ----------------------------<  156<H>  5.3   |  22  |  1.69<H>    Ca    9.1      21 Jul 2020 06:00  Phos  3.4     07-21  Mg     1.6     07-21    TPro  5.2<L>  /  Alb  2.9<L>  /  TBili  0.8  /  DBili  0.3<H>  /  AST  14  /  ALT  21  /  AlkPhos  117  07-21      ASSESSMENT/PLAN  64y Male with functional deficits after complicated and prolonged hospital course due to decompensated cirrhosis now s/p liver transplant 7/2.   - Continue PT/OT  - Skin- Turn q2h, check skin daily  - DVT px- SCDs  - Monitor K+  - Will f/u rehab needs as clinical course progresses- function has declined and will f/u to see if he can tolerate acute rehab

## 2020-07-21 NOTE — DISCHARGE NOTE PROVIDER - NSDCFUADDINST_GEN_ALL_CORE_FT
Until cleared by our team, please have a trusted individual with you at all times Wound Care Instructions:  1. Sacral wound- please cleanse with incontinence cleanser, pat dry, then apply stoma powder and seal in w/ cavilon skin protectant 2 times a day and when soiled.    2. Heal ulcer- please apply collagenase and allevyn pad daily.     3) Nystatin cream to groin   Until cleared by our team, please have a trusted individual with you at all times

## 2020-07-21 NOTE — PROGRESS NOTE ADULT - SUBJECTIVE AND OBJECTIVE BOX
Transplant Surgery - Multidisciplinary Rounds  --------------------------------------------------------------  [facility]      Date:  7/2/2020       POD#19    Present:  Patient seen with multidisciplinary team including Transplant Surgeon: Dr. Chris, Dr. Gray. Transplant Nephrologist: Dr. FAY Patel, and renal fellow, Pharmacist: Ancelmo Blanca, NP; O[employer] payan student, PGY 3 Dr. Quezada, and RN with RN manager in am rounds and examined with Dr. Gray. Disciplines not in attendance will be notified of the plan.     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     Re-admitted on 6/11:   ·	worsening HE  ·	UTI/VRE: tx with linezolid 6/15-22, bactrim DS 6/22 - 7/2  ·	MISA/Hyponatremia  ·	UGI bleed (melena) s/p EGD (6/22) c/w hemorrhagic duodenitis/gastritis; Grade 2 EV; requiring multiple transfusions  ·	Known h/o R foot ulcer (MRI neg for OM)  ·	s/p OLTx on 7/2/2020, post op liver doppler with patent vessels  ·	post op course c/b delirium and VRE bacteremia (7/10)     Interval events:  - POD 19 s/p OLT with good graft function;  LFTs stable  - Had better sleep/rest overnight per staff  - Awake restless, continuing to have waxing and waning mental status with periods of lethargy  - Tolerating reg diet; having bowel function   - Pain well controlled   - OOB to chair with heavy assist, working with PT/OT    Potential Discharge date: pending clinical improvement     Education:  Medications    Plan of care:  See Below       MEDICATIONS  (STANDING):  aspirin enteric coated 81 milliGRAM(s) Oral daily  chlorhexidine 2% Cloths 1 Application(s) Topical <User Schedule>  collagenase Ointment 1 Application(s) Topical daily  cycloSPORINE  , modified (GENGRAF) 100 milliGRAM(s) Oral <User Schedule>  insulin glargine Injectable (LANTUS) 10 Unit(s) SubCutaneous at bedtime  insulin lispro (HumaLOG) corrective regimen sliding scale   SubCutaneous three times a day before meals  insulin lispro (HumaLOG) corrective regimen sliding scale   SubCutaneous at bedtime  insulin lispro Injectable (HumaLOG) 4 Unit(s) SubCutaneous three times a day before meals  linezolid  IVPB 600 milliGRAM(s) IV Intermittent every 12 hours  magnesium oxide 800 milliGRAM(s) Oral two times a day with meals  melatonin 3 milliGRAM(s) Oral at bedtime  mycophenolate mofetil 750 milliGRAM(s) Oral <User Schedule>  nystatin    Suspension 838449 Unit(s) Swish and Swallow four times a day  nystatin Cream 1 Application(s) Topical two times a day  pantoprazole    Tablet 40 milliGRAM(s) Oral before breakfast  predniSONE   Tablet 5 milliGRAM(s) Oral daily  QUEtiapine 25 milliGRAM(s) Oral <User Schedule>  tamsulosin 0.8 milliGRAM(s) Oral at bedtime  trimethoprim   80 mG/sulfamethoxazole 400 mG 1 Tablet(s) Oral daily  valGANciclovir 900 milliGRAM(s) Oral daily    MEDICATIONS  (PRN):      PAST MEDICAL & SURGICAL HISTORY:  GIB (gastrointestinal bleeding)  GERD with esophagitis: Gastritis &amp; Non Bleeding Ulcers  Hepatic encephalopathy  Obesity  Fatty liver disease, nonalcoholic  Renal stones: 25 years ago  Hypertension  Neuropathy  Hypercholesteremia  Diabetes  S/P cholecystectomy      Vital Signs Last 24 Hrs  T(C): 36.7 (21 Jul 2020 09:30), Max: 37.2 (20 Jul 2020 21:00)  T(F): 98.1 (21 Jul 2020 09:30), Max: 98.9 (20 Jul 2020 21:00)  HR: 72 (21 Jul 2020 09:30) (72 - 92)  BP: 144/75 (21 Jul 2020 09:30) (108/77 - 144/75)  BP(mean): 92 (20 Jul 2020 17:00) (92 - 92)  RR: 19 (21 Jul 2020 09:30) (18 - 19)  SpO2: 98% (21 Jul 2020 09:30) (94% - 100%)    I&O's Summary    20 Jul 2020 07:01  -  21 Jul 2020 07:00  --------------------------------------------------------  IN: 2020 mL / OUT: 2550 mL / NET: -530 mL    21 Jul 2020 07:01  -  21 Jul 2020 10:33  --------------------------------------------------------  IN: 270 mL / OUT: 0 mL / NET: 270 mL                              9.1    3.70  )-----------( 102      ( 21 Jul 2020 06:00 )             27.9     07-21    137  |  103  |  34<H>  ----------------------------<  156<H>  5.3   |  22  |  1.69<H>    Ca    9.1      21 Jul 2020 06:00  Phos  3.4     07-21  Mg     1.6     07-21    TPro  5.2<L>  /  Alb  2.9<L>  /  TBili  0.8  /  DBili  0.3<H>  /  AST  14  /  ALT  21  /  AlkPhos  117  07-21    Tacrolimus (), Serum: 5.5 ng/mL (07-20 @ 07:26)      Review of systems  Gen: weakness  Skin: No rashes  Head/Eyes/Ears/Mouth: No headache; Normal hearing; Normal vision w/o blurriness; No sinus pain/discomfort, sore throat  Respiratory: No dyspnea, cough, wheezing, hemoptysis  CV: No chest pain, PND, orthopnea  GI: Mild abdominal pain at surgical incision site; denies diarrhea, constipation, nausea, vomiting, melena, hematochezia  : No increased frequency, dysuria, hematuria, nocturia  MSK: No joint pain/swelling; no back pain; no edema, RT groin incision with staples intact   Neuro: No dizziness/lightheadedness, weakness, seizures, numbness, tingling  Heme: No easy bruising or bleeding  Endo: No heat/cold intolerance  Psych: Restless   All other systems were reviewed and are negative, except as noted.      PHYSICAL EXAM:  Constitutional: Restless and confused at times  Eyes: Anicteric, PERRLA  ENMT: nc/at  Neck: no central line, no JVD  Respiratory: CTA B/L, unlabored breathing   Cardiovascular: RRR  Gastrointestinal: soft, NT, ND, incision c/d/i, T-tube tied   Genitourinary: Voiding spontaneously  Extremities: heel wound without drainage or purulent material, edema improving   Vascular: Palpable dp pulses bilaterally  Neurological: Confused with easy reorientation, +mild tremors  Skin: no rashes  Musculoskeletal: Moving all extremities  Psychiatric: Responsive

## 2020-07-21 NOTE — DISCHARGE NOTE PROVIDER - NSDCCPCAREPLAN_GEN_ALL_CORE_FT
PRINCIPAL DISCHARGE DIAGNOSIS  Diagnosis: Liver transplant recipient  Assessment and Plan of Treatment: Kidney replaced by transplant  No heavy lifting anything more than 10-15lbs or straining. Otherwise, you may return to your usual level of physical activity. If you are taking narcotic pain medication (such as Percocet), do NOT drive a car, operate machinery or make important decisions.  Call trnansplant clinic If you developed any of the following, fever, pain, redness, swelling at incision site, cough, nausea, vomiting, painful urination, difficulty urination, or not making any urine.  NOTIFY YOUR SURGEON IF: You have any bleeding that does not stop, any pus draining from your wound, any fever (over 100.4 F) or chills, persistent nausea/vomiting with inability to tolerate food or liquids, persistent diarrhea, or if your pain is not controlled on your discharge pain medications.  Immunosuppression:   Keep away from people who have cough, cold, and symptom of flu.  Only take medications that are on your discharge list  If you missed your medications call the transplant office, do not double up medication because you missed a dose.  If you have any question regarding your medication please call transplant office.      SECONDARY DISCHARGE DIAGNOSES  Diagnosis: VRE bacteremia  Assessment and Plan of Treatment: Take all antibiotics as ordered.  Call you Health care provider upon arrival home to make a one week follow up appointment.  If you develop fever, chills, malaise, or change in mental status call your Health Care Provider or go to the Emergency Department.  Nutrition is important, eat small frequent meals to help ensure you get adequate calories.  Do not stay in bed all day!  Increase your activity daily as tolerated.    Diagnosis: Delirium due to another medical condition  Assessment and Plan of Treatment: You will be monitored closely by the rehab staff and your family for safety and  reoreitation PRINCIPAL DISCHARGE DIAGNOSIS  Diagnosis: Liver transplant recipient  Assessment and Plan of Treatment: Pt was downgraded from ICU care and progressed well from a surgical perspective. Tolerated regular diet, had bowel function, had good pain control with minimal narcotics, and ambulated well with PT.  He tolerated all of the new immunosuppression medication regimen.  Medication/diet education was provided regularly by Transplant Pharmacy and Nutrition teams.      SECONDARY DISCHARGE DIAGNOSES  Diagnosis: VRE bacteremia  Assessment and Plan of Treatment: Take all antibiotics as ordered.  If you develop fever, chills, malaise, or change in mental status call the Transplant center    Diagnosis: Delirium due to another medical condition  Assessment and Plan of Treatment: You will be monitored closely by the rehab staff and your family for safety PRINCIPAL DISCHARGE DIAGNOSIS  Diagnosis: Liver transplant recipient  Assessment and Plan of Treatment: You underwent a liver transplant on 7/2/2020.    - Avoid heavy lifting aything over 5lbs for six weeks following your surgery date. Do NOT drive a car or operate machinery unless cleared by your transplant surgeon during your follow up visit. Avoid straining, use stool softners as needed. Stop stool softners if diarrhea develops.   - Call transplant clinic if you develop fever, increased abdominal pain, redness/swelling or bleeding around your incision site  - Call transplant clinic if you have difficulty urinating, notice decrease in urine output or blood in urine.   - Follow FOOD SAFETY instruction provided to you in your patient education guide bookelet   - Bathing: Shower is allowed, you can let soap and water flow over your incision; do NOT rub the area. Bath is NOT allowed until your incision is healed and you have been cleared by your transplant surgeon.      SECONDARY DISCHARGE DIAGNOSES  Diagnosis: VRE bacteremia  Assessment and Plan of Treatment: Take all antibiotics as ordered.  If you develop fever, chills, malaise, or change in mental status call the Transplant center    Diagnosis: Delirium due to another medical condition  Assessment and Plan of Treatment: you suffered from delirium as a result of an intolerance to an anti-rejection medication.  you will continue the new regimen as prescribed. contact our office if any new changes to your health, including confusion, headaches, tremors PRINCIPAL DISCHARGE DIAGNOSIS  Diagnosis: Liver transplant recipient  Assessment and Plan of Treatment: You underwent a liver transplant on 7/2/2020.    - Avoid heavy lifting aything over 5lbs for six weeks following your surgery date. Do NOT drive a car or operate machinery unless cleared by your transplant surgeon during your follow up visit. Avoid straining, use stool softners as needed. Stop stool softners if diarrhea develops.   - Call transplant clinic if you develop fever, increased abdominal pain, redness/swelling or bleeding around your incision site  - Call transplant clinic if you have difficulty urinating, notice decrease in urine output or blood in urine.   - Follow FOOD SAFETY instruction provided to you in your patient education guide bookelet   - Bathing: Shower is allowed, you can let soap and water flow over your incision; do NOT rub the area. Bath is NOT allowed until your incision is healed and you have been cleared by your transplant surgeon.      SECONDARY DISCHARGE DIAGNOSES  Diagnosis: Immunosuppression  Assessment and Plan of Treatment: - Transplant recipients are at risk for developing infection because immune system is lowered due to transplant medications.   - Avoid contact with sick people; practice good hand hygiene, wear masks at all times when around people, and avoid travel  - Take medications as directed by your translant team. Never stop taking medications unless instructed by your transplant physician.  - Do NOT double up medication dose if you missed your dose; call the transplant office for instructions.    Diagnosis: VRE bacteremia  Assessment and Plan of Treatment: Take all antibiotics as ordered.  If you develop fever, chills, malaise, or change in mental status call the Transplant center    Diagnosis: Type 2 diabetes, uncontrolled, with ulcer of heel  Assessment and Plan of Treatment: If you have diabetes, you may need to monitor your blood sugar more frequently after transplant. Uncongrolled blood sugar levels can lead to poor wound healing and other complications. Follow a low carb and low sugar diet. Continue to take all anti-diabetic medications/insulin as prescribed. Follow up with your Primary Care Doctor regularly for blood sugar/A1c checks. Follow up with an opthalmologist and a podiatrist on an annual basis.      Diagnosis: GERD with esophagitis  Assessment and Plan of Treatment: please continue your medication as prescribed. contact our office or return to ED if any signs of bleeding or severe pain    Diagnosis: Delirium due to another medical condition  Assessment and Plan of Treatment: you suffered from delirium as a result of an intolerance to an anti-rejection medication.  you will continue the new regimen as prescribed. contact our office if any new changes to your health, including confusion, headaches, tremors

## 2020-07-21 NOTE — CHART NOTE - NSCHARTNOTEFT_GEN_A_CORE
Nutrition Follow Up Note     Patient seen for: nutrition follow up s/p Liver Txp 7/2.    Source: medical record, communication with team. Pt interview pending - A&O x 2, noted  to be lethargic, with ongoing confusion/agitation overnight; ordered for constant observation.    Chart reviewed, events noted. s/p liver transplant 7/2: live enzymes continue to downtrend    Diet Order: Diet, Consistent Carbohydrate w/Evening Snack:   Low Fat (LOWFAT)  No Concentrated Potassium (07-19-20 @ 07:04)    Nutrition Events:   - PO Intake: 100% po intake per nursing flowsheets  - Hyperglycemia; Endocrine is following. Currently managed with Lantus (10 units bedtime), Humalog (4 units pre-meal), and Humalog corrective regimen.  - Hyperkalemia: potassium now trending down from 5.4 (7/20) to 5.3 (7/21); No Concentrated Potassium restriction added to diet order  - Nutrition Education: Previously reviewed post transplant nutrition therapy and food safety guidelines for transplant recipients. Reviewed recommendations to avoid grapefruit, pomegranate and star fruit while taking immunosuppressant medication. Reviewed recommendations for moderate intake of sodium and carbohydrates with transplant medications. RD will continue to reinforce diet education prior to discharge.     Last BM 7/20, 7/21    Anthropometric Measurements:   Height (cm): 185 (07-01-20 @ 13:54), 185.42 (04-27-20 @ 14:53)  Weight (kg): 103 (07-01-20 @ 13:54), 117.9 (04-27-20 @ 14:53); 118.2 (07-10-20). 106.2 (07-16-20); 106.4 (07-21/20); weight changes likely reflect fluid shifts  BMI (kg/m2): 30.1 (07-01-20 @ 13:54), 34.3 (04-27-20 @ 14:53)  IBW: 83.4 kg    Medications: MEDICATIONS  (STANDING):  aspirin enteric coated 81 milliGRAM(s) Oral daily  chlorhexidine 2% Cloths 1 Application(s) Topical <User Schedule>  collagenase Ointment 1 Application(s) Topical daily  cycloSPORINE  , modified (GENGRAF) 100 milliGRAM(s) Oral <User Schedule>  insulin glargine Injectable (LANTUS) 10 Unit(s) SubCutaneous at bedtime  insulin lispro (HumaLOG) corrective regimen sliding scale   SubCutaneous three times a day before meals  insulin lispro (HumaLOG) corrective regimen sliding scale   SubCutaneous at bedtime  insulin lispro Injectable (HumaLOG) 4 Unit(s) SubCutaneous three times a day before meals  linezolid  IVPB 600 milliGRAM(s) IV Intermittent every 12 hours  magnesium oxide 800 milliGRAM(s) Oral two times a day with meals  melatonin 3 milliGRAM(s) Oral at bedtime  mycophenolate mofetil 750 milliGRAM(s) Oral <User Schedule>  nystatin    Suspension 527313 Unit(s) Swish and Swallow four times a day  nystatin Cream 1 Application(s) Topical two times a day  pantoprazole    Tablet 40 milliGRAM(s) Oral before breakfast  predniSONE   Tablet 5 milliGRAM(s) Oral daily  QUEtiapine 25 milliGRAM(s) Oral <User Schedule>  tamsulosin 0.8 milliGRAM(s) Oral at bedtime  trimethoprim   80 mG/sulfamethoxazole 400 mG 1 Tablet(s) Oral daily  valGANciclovir 900 milliGRAM(s) Oral daily    Labs: 07-21 @ 06:00: Sodium 137, Potassium 5.3, Calcium 9.1, Magnesium 1.6, Phosphorus 3.4, BUN 34<H>, Creatinine 1.69<H>, Glucose 156<H>, Alk Phos 117, ALT/SGPT 21, AST/SGOT 14, Albumin 2.9<L>, Total Bilirubin 0.8, Hemoglobin 9.1<L>, Hematocrit 27.9<L>, Creatine Kinase <<27>    POCT Blood Glucose.: 168 mg/dL (07-21-20 @ 08:21)  POCT Blood Glucose.: 170 mg/dL (07-20-20 @ 21:27)  POCT Blood Glucose.: 110 mg/dL (07-20-20 @ 17:46)  POCT Blood Glucose.: 148 mg/dL (07-20-20 @ 13:04)    Skin per nursing documentation: no pressure injuries documented  Edema: 1+ generalized, 2+ left/right ankle    Estimated Needs: based on IBW 83.4 kg   Energy: 4070-1972 calories  (25-30 fahad/kg)  Protein: 100-117 gm (1.2-1.4 gm/kg)    Previous Nutrition Diagnosis: 1) Increased Nutrient Needs 2) Food and Nutrition Related Knowledge Deficit  Nutrition Diagnosis is: ongoing, addressed with therapeutic diet and diet education    New Nutrition Diagnosis:  none    Recommended Interventions:   1) Continue Consistent Carbohydrate, Low Fat diet. Continue Potassium restriction as warranted.  2) Continue to reinforce post-transplant nutrition therapy and food safety guidelines in-house and prior to discharge.   3) Discharge diet: Continue as above. Recommend follow up visit with Transplant MD and outpatient RD for dietary modifications as warranted.       Monitoring and Evaluation:   Continue to monitor nutrition provision and tolerance, weights, labs, skin integrity.   RD remains available upon request and will follow up per protocol.    Myah Argueta MS RD CDN [facility]; Pager # [phone] Nutrition Follow Up Note     Patient seen for: nutrition follow up s/p Liver Txp 7/2.    Source: PCA, medical record, communication with team. Pt A&O x 2, lethargic, with ongoing confusion/agitation overnight; ordered for constant observation.    Chart reviewed, events noted. s/p liver transplant 7/2: live enzymes continue to downtrend    Diet Order: Diet, Consistent Carbohydrate w/Evening Snack:   Low Fat (LOWFAT)  No Concentrated Potassium (07-19-20 @ 07:04)    Nutrition Events:   - PO Intake: 100% po intake per nursing flowsheets  - Hyperglycemia; Endocrine is following. Currently managed with Lantus (10 units bedtime), Humalog (4 units pre-meal), and Humalog corrective regimen.  - Hyperkalemia: potassium now trending down from 5.4 (7/20) to 5.3 (7/21); No Concentrated Potassium restriction added to diet order  - Nutrition Education: Previously reviewed post transplant nutrition therapy and food safety guidelines for transplant recipients. Reviewed recommendations to avoid grapefruit, pomegranate and star fruit while taking immunosuppressant medication. Reviewed recommendations for moderate intake of sodium and carbohydrates with transplant medications. Unable to provide education reinforcement at this time; RD will continue to reinforce diet education prior to discharge, when pt is alert and receptive.   - Education Handouts (left at bedside for wife): [employer] Food Safety booklet; Type 2 Diabetes Nutrition Therapy handout     Last BM 7/20, 7/21    Anthropometric Measurements:   Height (cm): 185 (07-01-20 @ 13:54), 185.42 (04-27-20 @ 14:53)  Weight (kg): 103 (07-01-20 @ 13:54), 117.9 (04-27-20 @ 14:53); 118.2 (07-10-20). 106.2 (07-16-20); 106.4 (07-21/20); weight changes likely reflect fluid shifts  BMI (kg/m2): 30.1 (07-01-20 @ 13:54), 34.3 (04-27-20 @ 14:53)  IBW: 83.4 kg    Medications: MEDICATIONS  (STANDING):  aspirin enteric coated 81 milliGRAM(s) Oral daily  chlorhexidine 2% Cloths 1 Application(s) Topical <User Schedule>  collagenase Ointment 1 Application(s) Topical daily  cycloSPORINE  , modified (GENGRAF) 100 milliGRAM(s) Oral <User Schedule>  insulin glargine Injectable (LANTUS) 10 Unit(s) SubCutaneous at bedtime  insulin lispro (HumaLOG) corrective regimen sliding scale   SubCutaneous three times a day before meals  insulin lispro (HumaLOG) corrective regimen sliding scale   SubCutaneous at bedtime  insulin lispro Injectable (HumaLOG) 4 Unit(s) SubCutaneous three times a day before meals  linezolid  IVPB 600 milliGRAM(s) IV Intermittent every 12 hours  magnesium oxide 800 milliGRAM(s) Oral two times a day with meals  melatonin 3 milliGRAM(s) Oral at bedtime  mycophenolate mofetil 750 milliGRAM(s) Oral <User Schedule>  nystatin    Suspension 613532 Unit(s) Swish and Swallow four times a day  nystatin Cream 1 Application(s) Topical two times a day  pantoprazole    Tablet 40 milliGRAM(s) Oral before breakfast  predniSONE   Tablet 5 milliGRAM(s) Oral daily  QUEtiapine 25 milliGRAM(s) Oral <User Schedule>  tamsulosin 0.8 milliGRAM(s) Oral at bedtime  trimethoprim   80 mG/sulfamethoxazole 400 mG 1 Tablet(s) Oral daily  valGANciclovir 900 milliGRAM(s) Oral daily    Labs: 07-21 @ 06:00: Sodium 137, Potassium 5.3, Calcium 9.1, Magnesium 1.6, Phosphorus 3.4, BUN 34<H>, Creatinine 1.69<H>, Glucose 156<H>, Alk Phos 117, ALT/SGPT 21, AST/SGOT 14, Albumin 2.9<L>, Total Bilirubin 0.8, Hemoglobin 9.1<L>, Hematocrit 27.9<L>, Creatine Kinase <<27>    POCT Blood Glucose.: 168 mg/dL (07-21-20 @ 08:21)  POCT Blood Glucose.: 170 mg/dL (07-20-20 @ 21:27)  POCT Blood Glucose.: 110 mg/dL (07-20-20 @ 17:46)  POCT Blood Glucose.: 148 mg/dL (07-20-20 @ 13:04)    Skin per nursing documentation: no pressure injuries documented  Edema: 1+ generalized, 2+ left/right ankle    Estimated Needs: based on IBW 83.4 kg   Energy: 9814-9293 calories  (25-30 fahad/kg)  Protein: 100-117 gm (1.2-1.4 gm/kg)    Previous Nutrition Diagnosis: 1) Increased Nutrient Needs 2) Food and Nutrition Related Knowledge Deficit  Nutrition Diagnosis is: ongoing, addressed with therapeutic diet and diet education    New Nutrition Diagnosis:  none    Recommended Interventions:   1) Continue Consistent Carbohydrate, Low Fat diet. Continue Potassium restriction as warranted.  2) Continue to reinforce post-transplant nutrition therapy and food safety guidelines in-house and prior to discharge.   3) Discharge diet: Continue as above. Recommend follow up visit with Transplant MD and outpatient RD for dietary modifications as warranted.       Monitoring and Evaluation:   Continue to monitor nutrition provision and tolerance, weights, labs, skin integrity.   RD remains available upon request and will follow up per protocol.    Myah Argueta MS RD CDN [facility]; Pager # [phone] Nutrition Follow Up Note     Patient seen for: nutrition follow up s/p Liver Txp 7/2.    Source: PCA, medical record, communication with team. Pt A&O x 2, lethargic, with ongoing confusion/agitation overnight; ordered for constant observation.    Chart reviewed, events noted. s/p liver transplant 7/2: live enzymes continue to downtrend    Diet Order: Diet, Consistent Carbohydrate w/Evening Snack:   Low Fat (LOWFAT)  No Concentrated Potassium (07-19-20 @ 07:04)    Nutrition Events:   - PO Intake: 100% po intake per nursing flowsheets  - Hyperglycemia; Endocrine is following. Currently managed with Lantus (10 units bedtime), Humalog (4 units pre-meal), and Humalog corrective regimen.  - Hyperkalemia: potassium now trending down from 5.4 (7/20) to 5.3 (7/21); No Concentrated Potassium restriction added to diet order  - Nutrition Education: Previously reviewed post transplant nutrition therapy and food safety guidelines for transplant recipients. Reviewed recommendations to avoid grapefruit, pomegranate and star fruit while taking immunosuppressant medication. Reviewed recommendations for moderate intake of sodium and carbohydrates with transplant medications. Unable to provide education reinforcement at this time; RD will continue to reinforce diet education prior to discharge, when pt is alert and receptive.   - Education Handouts (left at bedside for wife): [employer] Food Safety booklet; Type 2 Diabetes Nutrition Therapy handout     Last BM 7/20, 7/21    Anthropometric Measurements:   Height (cm): 185 (07-01-20 @ 13:54), 185.42 (04-27-20 @ 14:53)  Weight (kg): 103 (07-01-20 @ 13:54), 117.9 (04-27-20 @ 14:53); 118.2 (07-10-20). 106.2 (07-16-20); 106.4 (07-21/20); weight changes likely reflect fluid shifts  BMI (kg/m2): 30.1 (07-01-20 @ 13:54), 34.3 (04-27-20 @ 14:53)  IBW: 83.4 kg    Medications: MEDICATIONS  (STANDING):  aspirin enteric coated 81 milliGRAM(s) Oral daily  chlorhexidine 2% Cloths 1 Application(s) Topical <User Schedule>  collagenase Ointment 1 Application(s) Topical daily  cycloSPORINE  , modified (GENGRAF) 100 milliGRAM(s) Oral <User Schedule>  insulin glargine Injectable (LANTUS) 10 Unit(s) SubCutaneous at bedtime  insulin lispro (HumaLOG) corrective regimen sliding scale   SubCutaneous three times a day before meals  insulin lispro (HumaLOG) corrective regimen sliding scale   SubCutaneous at bedtime  insulin lispro Injectable (HumaLOG) 4 Unit(s) SubCutaneous three times a day before meals  linezolid  IVPB 600 milliGRAM(s) IV Intermittent every 12 hours  magnesium oxide 800 milliGRAM(s) Oral two times a day with meals  melatonin 3 milliGRAM(s) Oral at bedtime  mycophenolate mofetil 750 milliGRAM(s) Oral <User Schedule>  nystatin    Suspension 116785 Unit(s) Swish and Swallow four times a day  nystatin Cream 1 Application(s) Topical two times a day  pantoprazole    Tablet 40 milliGRAM(s) Oral before breakfast  predniSONE   Tablet 5 milliGRAM(s) Oral daily  QUEtiapine 25 milliGRAM(s) Oral <User Schedule>  tamsulosin 0.8 milliGRAM(s) Oral at bedtime  trimethoprim   80 mG/sulfamethoxazole 400 mG 1 Tablet(s) Oral daily  valGANciclovir 900 milliGRAM(s) Oral daily    Labs: 07-21 @ 06:00: Sodium 137, Potassium 5.3, Calcium 9.1, Magnesium 1.6, Phosphorus 3.4, BUN 34<H>, Creatinine 1.69<H>, Glucose 156<H>, Alk Phos 117, ALT/SGPT 21, AST/SGOT 14, Albumin 2.9<L>, Total Bilirubin 0.8, Hemoglobin 9.1<L>, Hematocrit 27.9<L>, Creatine Kinase <<27>    HbA1c 5.2%    POCT Blood Glucose.: 168 mg/dL (07-21-20 @ 08:21)  POCT Blood Glucose.: 170 mg/dL (07-20-20 @ 21:27)  POCT Blood Glucose.: 110 mg/dL (07-20-20 @ 17:46)  POCT Blood Glucose.: 148 mg/dL (07-20-20 @ 13:04)    Skin per nursing documentation: no pressure injuries documented  Edema: 1+ generalized, 2+ left/right ankle    Estimated Needs: based on IBW 83.4 kg   Energy: 5653-7493 calories  (25-30 fahad/kg)  Protein: 100-117 gm (1.2-1.4 gm/kg)    Previous Nutrition Diagnosis: 1) Increased Nutrient Needs 2) Food and Nutrition Related Knowledge Deficit  Nutrition Diagnosis is: ongoing, addressed with therapeutic diet and diet education    New Nutrition Diagnosis:  none    Recommended Interventions:   1) Continue Consistent Carbohydrate, Low Fat diet. Continue Potassium restriction as warranted.  2) Continue to reinforce post-transplant nutrition therapy and food safety guidelines in-house and prior to discharge.   3) Discharge diet: Continue as above. Recommend follow up visit with Transplant MD and outpatient RD for dietary modifications as warranted.       Monitoring and Evaluation:   Continue to monitor nutrition provision and tolerance, weights, labs, skin integrity.   RD remains available upon request and will follow up per protocol.    Myah Argueta MS RD CDN [facility]; Pager # [phone]

## 2020-07-21 NOTE — PROGRESS NOTE ADULT - SUBJECTIVE AND OBJECTIVE BOX
Chief Complaint:  Patient is a 64y old  Male who presents with a chief complaint of Liver failure, hepatic encephalopathy (2020 13:31)      Interval Events: Patient reportedly slept overnight. No other complaints. States he feels in good spirits although does appear confused.    Allergies:  codeine (Anaphylaxis)      Hospital Medications:  aspirin enteric coated 81 milliGRAM(s) Oral daily  chlorhexidine 2% Cloths 1 Application(s) Topical <User Schedule>  collagenase Ointment 1 Application(s) Topical daily  cycloSPORINE  , modified (GENGRAF) 100 milliGRAM(s) Oral <User Schedule>  insulin glargine Injectable (LANTUS) 10 Unit(s) SubCutaneous at bedtime  insulin lispro (HumaLOG) corrective regimen sliding scale   SubCutaneous three times a day before meals  insulin lispro (HumaLOG) corrective regimen sliding scale   SubCutaneous at bedtime  insulin lispro Injectable (HumaLOG) 4 Unit(s) SubCutaneous three times a day before meals  linezolid  IVPB 600 milliGRAM(s) IV Intermittent every 12 hours  magnesium oxide 800 milliGRAM(s) Oral two times a day with meals  melatonin 3 milliGRAM(s) Oral at bedtime  mycophenolate mofetil 750 milliGRAM(s) Oral <User Schedule>  nystatin    Suspension 423383 Unit(s) Swish and Swallow four times a day  nystatin Cream 1 Application(s) Topical two times a day  pantoprazole    Tablet 40 milliGRAM(s) Oral before breakfast  predniSONE   Tablet 5 milliGRAM(s) Oral daily  QUEtiapine 25 milliGRAM(s) Oral <User Schedule>  tamsulosin 0.8 milliGRAM(s) Oral at bedtime  trimethoprim   80 mG/sulfamethoxazole 400 mG 1 Tablet(s) Oral daily  valGANciclovir 900 milliGRAM(s) Oral daily      PMHX/PSHX:  GIB (gastrointestinal bleeding)  GERD with esophagitis  Hepatic encephalopathy  Obesity  Fatty liver disease, nonalcoholic  Renal stones  Hypertension  Neuropathy  Hypercholesteremia  Diabetes  S/P cholecystectomy  No significant past surgical history      Family history:  Family history of type 2 diabetes mellitus  Family history of hypertension  Family history of stomach cancer  No pertinent family history in first degree relatives      ROS: As per HPI, 14-point ROS negative otherwise.        PHYSICAL EXAM:     Vital Signs:  Vital Signs Last 24 Hrs  T(C): 36.4 (2020 14:00), Max: 37.2 (2020 21:00)  T(F): 97.6 (2020 14:00), Max: 98.9 (2020 21:00)  HR: 80 (2020 14:00) (72 - 92)  BP: 113/78 (2020 14:00) (108/77 - 144/75)  BP(mean): 92 (2020 17:00) (92 - 92)  RR: 17 (2020 14:00) (17 - 19)  SpO2: 95% (2020 14:00) (94% - 100%)  Daily     Daily Weight in k.4 (2020 07:00)    GENERAL:  appears comfortable, no acute distress, confused  HEENT:  NC/AT,  conjunctivae clear, sclera -anicteric  CHEST:  no increased effort  HEART:  Regular rate and rhythm  ABDOMEN:  Soft, non-tender, non-distended,  no masses, surgical scar noted with tenderness  EXTREMITIES:  no cyanosis, clubbing or edema  SKIN:  No rash/erythema/ecchymoses/petechiae/wounds  NEURO:  Alert, orientedx2, intermittently fatigued during exam    LABS:                        9.1    3.70  )-----------( 102      ( 2020 06:00 )             27.9     07-21    137  |  103  |  34<H>  ----------------------------<  156<H>  5.3   |  22  |  1.69<H>    Ca    9.1      2020 06:00  Phos  3.4     07-  Mg     1.6     07-21    TPro  5.2<L>  /  Alb  2.9<L>  /  TBili  0.8  /  DBili  0.3<H>  /  AST  14  /  ALT  21  /  AlkPhos  117  07-21    LIVER FUNCTIONS - ( 2020 06:00 )  Alb: 2.9 g/dL / Pro: 5.2 g/dL / ALK PHOS: 117 U/L / ALT: 21 U/L / AST: 14 U/L / GGT: x           PT/INR - ( 2020 06:00 )   PT: 12.1 sec;   INR: 1.02 ratio         PTT - ( 2020 06:00 )  PTT:29.5 sec  Urinalysis Basic - ( 2020 00:16 )    Color: Colorless / Appearance: Clear / S.009 / pH: x  Gluc: x / Ketone: Negative  / Bili: Negative / Urobili: Negative   Blood: x / Protein: Negative / Nitrite: Negative   Leuk Esterase: Negative / RBC: 25 /hpf / WBC 3 /HPF   Sq Epi: x / Non Sq Epi: 0 /hpf / Bacteria: Negative      Amylase Serum--      Lipase serum--       [id number]      Imaging:

## 2020-07-21 NOTE — PROGRESS NOTE ADULT - PROBLEM SELECTOR PLAN 1
-test BG AC/HS  -continue Lantus 10 units QHS and Humalog 4 units sq ac TID (hold if NPO)  -Humalog low correction scale AC and QHS  -monitor FS ac and hs  Plan discussed with team.   DISPO: basal/bolus, with doses TBD  -Pt can resume f/u with Dr. Mathur or   [facility] [address] [facility] [address] [place] [phone].

## 2020-07-21 NOTE — PROGRESS NOTE ADULT - ASSESSMENT
64 M hx of DM, HLD, GERD, HFpEF with mild LV diastolic dysfunction, and decompensated JEAN cirrhosis c/b ascites with hx of SBP, HE, duodenal ulcer, GAVE and duodenal AVM s/p APC (last on 10/11/19), UNOS listed for liver transplantation at [facility]. 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 S/p liver transplant 7/2 transferred from SICU to floor on 7/17/20 now with signs of tacrolimus toxicity and significant hospital delerium.     Impression:  #S/p liver transplant 7/2: liver enzymes continue to downtrend  OR details:  - L groin cutdown for vv bypass   - anastomosis: bicaval end-end/CBD duct-duct/HA & PV end-end, all standard anatomy.    - IOC with good flow  - Simulect induction. 500mg Solumedrol  - JPs x3 with T-Tube  - 14U pRBC, 14U FFP, 10 PLT, 11 CRYO, 500mL returned from cell saver, EBL 5L, UOP 1100mL  Recipient Info:   ABO: A  CMV:  Neg  EBV Positive  Donor:  Donor ID:  [id number] Match: [id number]  Age: 29 ABO:  A1 High Risk:   No COD: Cardiovascular  Anti CMV positive, EBV IgG- positive  HepBcAb-neg, Hepatitis C-DANA- neg, Hepatitis C ab-neg    # Halluciniations/agitation: worse at night. Getting significant chemical sedations. At times combative. Likely secondary to sleep wake cycle disturbances vs. ICU delerium vs. medication induced. On Seroquel, mirtazepine and melatonin at night. Olanzapine added PRN per Behavioral Health 7/18/20.  # Tremors: Involving bilateral UE - concern for tacrolimus toxicity requiring dose adjustments per Transplant Surgery.   # MISA: serum Cr improved again to 1.7. No episodes of hypotension. UOP remains good. Suspect MISA due to overdiuresis vs. tacrolimus toxicity vs. side effect of meropenem vs transient hypotension  # Elevated liver enzymes: Resolved with mildly elevated ALK-P. Possible secondary to DILI vs. sepsis induced from VRE bacteremia.   # VRE on culture from 7/10: Repeat culture 7/12 negative. On linezolid, s/p meropenem. CT showing groin hematoma, s/p aspiration showing likely seroma and low c/f infection per ID.   #GI bleed: Resolved. Hgb stable with no overt bleeding post-transplant. 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 GAVE and PHG likely improved posttransplant.  # Abnormal Chest X-Ray c/f evolving PNA vs atelectasis. No signs of active infection  # Hypertension: Had episode of hypotension. Now BP normal range.   # Lower ext edema: Improved with Lasix. Diuretics discontinued for MISA.   #R posterior heel wound w/ overlying eschar –Local wound care. no signs of infections, XR neg for gas, evaluated by podiatry and ID. MRI w/o contrast limited, but no overt signs of active infection.  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     Recommendation:  - minimize chemical sedation  - continue to hold diuretics and fluids today  - continue prednisone 5 mg PO daily  - continue with cyclosporine 100mg BID given c/f tacro toxicity  - continue Cellcept (currently on 750 mg BID) - dosing as per transplant surgery  - stop ursodiol  - linezolid for VE bacteremia as per Transplant ID; last dose 7/24  - continue nystatin, Bactrim and Valcyte ppx  - physical therapy daily to get OOB and ambulating  - continue with mag oxide 200 mg BID  - monitor Hb on daily CBC - continue PO PPI BID, monitor bowel movements  - follow up Wound care recommendations for R heel ulcer  - follow up other Transplant surgery recommendations

## 2020-07-21 NOTE — PROGRESS NOTE ADULT - PROBLEM SELECTOR PLAN 3
-defer statin in the setting of recent liver x-plant.  -discussed RN and PCA.  Liz Holley MD  Pager: [phone], between 9am-6pm  Nights and Weekends: [phone]

## 2020-07-21 NOTE — PROGRESS NOTE ADULT - ASSESSMENT
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 s/p treatment course for possible VRE UTI. Now s/p OLT on 7/2/20. Course complicated by Encephalopathy and Tremors. Blood Cultures (7/10) with VRE sensitive to Linezolid. CT C/A/P without obvious intraabdominal source. Hypoxia prior to positive BCx which may be related to sepsis CT Chest (7/11) without evidence of pneumonia. Had L groin fluid collection which appears to be consistent with seroma - sample obtained for culture also negative. No drainage, erythema or underlying fluctuance was appreciated at the right heel. MRI prior to transplant without obvious infection. Transferred from SICU to onti on 7/17/2020.    Currently on:  linezolid  IVPB 600 every 12 hours  nystatin    Suspension [id number] four times a day  trimethoprim   80 mG/sulfamethoxazole 400 mG 1 daily  valGANciclovir 900 daily    #VRE Bacteremia, L Groin Collection, Encephalopathy  - On Linezolid now  - Abd US showed 9cm Left groin collection: likely represents a seroma or lymphocele; aspirated - cx: PMN seen, no organism seen  - Continue Linezolid 600 mg IV Q12H (2 weeks from - BCx, End Date: 7/26), monitor CBC  - Continue monitoring tacrolimus levels  - TTE negative for endocarditis  - UA/UC on 7/20 no UTI  - Follow up BCx on 7/20    #Pancytopenia  - If worsening cytopenias CAN switch Linezolid to Daptomycin (or as alternate can hold Valcyte)    #Hypoxic Respiratory Failure, Encephalopathy  - No evidence of pneumonia and collection in left groin appears consistent with seroma  - now off the Meropenem    #s/p OLT   - Continue bactrim, valcyte (CMV D+/R-) for prophylaxis   - Monitor patient for tremors - monitor tacrolimus dose     #Right heel wound  --Podiatry on board    --Local wound care     #PENDING RECS. PLEASE WAIT FOR FINAL RECS AFTER DISCUSSION WITH ATTENDING#    Suyapa Mayer MD, PGY4  Fellow, Infectious Diseases  Pager: [phone]  If no response, after 5pm and on Weekends: Call [phone] ASSESSMENT/RECOMMENDATIONS:  63 y/o M PMHx decompensated JEAN Cirrhosis on transplant list, DMII, HFpEF, recent admission for ESBL E coli prostatic abscess 2/2 4 wks ertapenem, hx of ESBL UTIs with prostate abscess s/p IV abx, recent VRE urinary colonization came to hospital for worsening jaundice and episode of confusion, resolved PTA. s/p treatment course for possible VRE UTI. Now s/p OLT on 7/2/20. Course complicated by Encephalopathy and Tremors. Blood Cultures (7/10) with VRE sensitive to Linezolid. CT C/A/P without obvious intraabdominal source. Hypoxia prior to positive BCx which may be related to sepsis CT Chest (7/11) without evidence of pneumonia. Had L groin fluid collection which appears to be consistent with seroma - sample obtained for culture also negative. No drainage, erythema or underlying fluctuance was appreciated at the right heel. MRI prior to transplant without obvious infection. Transferred from SICU to onti on 7/17/2020.    Currently on:  linezolid  IVPB 600 every 12 hours  nystatin    Suspension [id number] four times a day  trimethoprim   80 mG/sulfamethoxazole 400 mG 1 daily  valGANciclovir 900 daily    #VRE Bacteremia, L Groin Collection, Encephalopathy  - On Linezolid now  - Abd US showed 9cm Left groin collection: likely represents a seroma or lymphocele; aspirated - cx: PMN seen, no organism seen  - Continue Linezolid 600 mg IV Q12H (2 weeks from - BCx, End Date: 7/26), monitor CBC  - Continue monitoring tacrolimus levels  - TTE negative for endocarditis  - UA/UC on 7/20 no UTI  - Follow up BCx on 7/20    #Pancytopenia  - If worsening cytopenias CAN switch Linezolid to Daptomycin (or as alternate can hold Valcyte)    #Hypoxic Respiratory Failure, Encephalopathy  - No evidence of pneumonia and collection in left groin appears consistent with seroma  - now off the Meropenem    #s/p OLT   - Continue bactrim, valcyte (CMV D+/R-) for prophylaxis   - Monitor patient for tremors - monitor tacrolimus dose to make sure trending down, otherwise now on cylosporine    #Right heel wound  --Podiatry on board    --Local wound care     Suyapa Mayer MD, PGY4  Fellow, Infectious Diseases  Pager: [phone]  If no response, after 5pm and on Weekends: Call [phone]    d/w Dr. Pérez

## 2020-07-21 NOTE — DISCHARGE NOTE PROVIDER - CARE PROVIDER_API CALL
Sergio Chris  SURGERY  [address] [place] [address]   [place] [address]  Phone: [phone]  Fax: [phone]  Follow Up Time:

## 2020-07-21 NOTE — DISCHARGE NOTE PROVIDER - HOSPITAL COURSE
64M with IDDM, HLD, obesity, HFpEF with mild LV diastolic dysfunction, MGUS, chronic anemia with a history of duodenal ulcer as well as GAVE and duodenal AVM s/p APC (last on 10/11/19), 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         Re-admitted on 6/11:     worsening HE    UTI/VRE: tx with linezolid 6/15-22, bactrim DS 6/22 - 7/2    MISA/Hyponatremia    UGI bleed (melena) s/p EGD (6/22) c/w hemorrhagic duodenitis/gastritis; Grade 2 EV; requiring multiple transfusions    Known h/o R foot ulcer (MRI neg for OM)    s/p OLTx on 7/2/2020, post op liver doppler with patent vessels    post op course c/b delirium and VRE bacteremia (7/10)                     Discharged on:    Tacro----MMF----Pred----        Follow up with Dr. Gray --------- 64M with IDDM, HLD, obesity, HFpEF with mild LV diastolic dysfunction, MGUS, chronic anemia with a history of duodenal ulcer as well as GAVE and duodenal AVM s/p APC (last on 10/11/19), 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         Re-admitted on 6/11:     worsening HE    UTI/VRE: tx with linezolid 6/15-22, bactrim DS 6/22 - 7/2    MISA/Hyponatremia    UGI bleed (melena) s/p EGD (6/22) c/w hemorrhagic duodenitis/gastritis; Grade 2 EV; requiring multiple transfusions and kept on Protonix    Known h/o R foot ulcer (MRI neg for OM), managed by Podiatry    =====================        s/p OLTx on 7/2/2020 with a MELD of----.     - Recipient hepatectomy, left groin cut down to access saphenofemoral junction for VV bypass cannulation;     - Transplant: bicaval anastomosis, standard PV anastomosis, patient taken off bypass, HA  anastomosis end-end, CBD anastomosis end-end, 5Fr T-tube placed, cholangiogram performed with adequate filling in both directions -14U pRBC, 14U FFP, 10 PLT, 11 CRYO, 500mL returned from cell saver, EBL 5L, UOP 1100mL. intra-op with pressors        ----    Recipient Info:    ABO: A    CMV:  Neg    EBV Positive        Donor:    [facility]    Donor ID:  [id number] Match: [id number]    Age: 29    ABO:  A1    High Risk:   No    COD: Cardiovascular    Medical Hx: Drug Abuse Positive cocaine, Negative: Barbituates, Methadone, Opiates, Phencyclidine, THC.      Anti CMV positive/EBV IgG- positive    HepBcAb-neg, Hepatitis C-DANA- neg, Hepatitis C ab-neg        ======================    Maintained excellent graft function.  Was on Actigal for a short course, but discontinued as LFTs remained stable.  dopplers with patent vessels.  post op course c/b:    1) VRE bacteremia (7/10) treated with Linezolid, followed by ID. repeat cultures remained negative    2) Delirium and tremors:  2/2 FK intolerance. Switched to Cyclosporine with relief.  Followed by Psych and required 1:1 observation    3) MISA: 2/2 diuresis from anasarca. 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  Pt was downgraded from ICU care and progressed well from a surgical perspective.  Tolerated diet, had excellent pain control and had bowel function.  All JPs x3 were removed. T-tube was tied off. Beasley was removed, passed TOV.   Requires Acute Rehab for PT/OT given significant weakness        He tolerated all of the new immunosuppression medication regimen.  Medication/diet education was provided regularly by Transplant Pharmacy and Nutrition teams.    Was seen daily by our multidisciplinary Transplant team of surgeons, hepatologists, pharmacists, ACPs, RNs, SW, Nutrition, Coordinators and deemed safe for discharge to Acute Rehab with the following plan:        -CYCLOSPORINE    -MMF 1000BID    -PRED 5    -VALCYTE 900 (CMV+/-), NYSTATIN, BACTRIM, PROTONIX    -ABX plan:    -PMD f/u in one week, including heel ulcer management 64 year old male with PMHx of IDDM, HLD, obesity, HFpEF with mild LV diastolic dysfunction, MGUS, chronic anemia with a history of duodenal ulcer as well as GAVE and duodenal AVM s/p APC (last on 10/11/19), 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         Patient re-admitted on 6/11 with worsening hepatic encephalopathy, VRE UTI (tx with linezolid 6/15-6/22, bactrim DS 6/22-7/2), MISA/Hyponatremia, UGI bleed (melena) s/p EGD 6/22 c/w hemorrhagic duodenitis/gastritis, Grade 2EV, requiring multiple transfusions.         Underwent liver transplant on 7/2/2020.         OR: Recipient hepatectomy, left groin cut down to access saphenofemoral junction for VV bypass cannulation;  Transplant: bicaval anastomosis, standard PV anastomosis, patient taken off bypass, HA  anastomosis end-end, CBD anastomosis end-end,     5Fr T-tube placed, cholangiogram performed with adequate filling in both directions -14U pRBC, 14U FFP, 10 PLT, 11 CRYO, 500mL returned from cell saver, EBL 5L, UOP 1100mL. intra-op with pressors        Recipient Info:    ABO: A    CMV:  Neg    EBV Positive        Donor:    [facility]    Donor ID:  [id number] Match: [id number]    Age: 29    ABO:  A1    High Risk:   No    COD: Cardiovascular    Medical Hx: Drug Abuse Positive cocaine, Negative: Barbituates, Methadone, Opiates, Phencyclidine, THC.      Anti CMV positive/EBV IgG- positive    HepBcAb-neg, Hepatitis C-DANA- neg, Hepatitis C ab-neg        Post transplant complicated by prolonged hospital course in the setting of VRE bacteremia and delirium, see details below.         VRE Bacteremia     - blood culture from 7/10 grew VRE Enterococcus    - Followed by transplant ID, was started on linezolid 7/10-7/23, dapto 7/23-7/26    - Follow up blood cultures with no growth to date        Delirium    - patient developed altered mental status and notable tremors around POD 3, initially presumed in setting of infection (vre bacteremia) and CNI toxicity    - altered mental status persisted accompanied with agitation and marked tremors, CT head negative on 7/31    - Tacrolimus discontinued on 7/19 and transitioned to cyclosporine with no improvement in mental status    - Cyclosporine discontinued on 7/27 and switched to everolimus with marked improvement in mental status and tremors.    - Worked with physical therapy daily         MISA    - In a setting of diuresis from anasarca. Both renal functin and edema improved.        Nocturnal hypoxia    - likely secondary to SHENG, required BIPAP at bedtime with relief    - Will need sleep study outpatient and dc on home oxygen         He maintained excellent graft function.  Was on Actigal for a short course, but discontinued as LFTs remained stable.  dopplers with patent vessels. From a surgical perspective, he did well, was downgraded from ICU care and progressed well with physical therapy. Tolerated diet, had excellent pain control and had bowel function.  All JPs x3 were removed. T-tube was tied off. Beasley was removed, passed TOV.           Medication/diet education was provided regularly by Transplant Pharmacy and Nutrition teams. Was seen daily by our multidisciplinary Transplant team of surgeons, hepatologists, pharmacists, ACPs, RNs, SW, Nutrition, Coordinators and deemed safe for discharge to ______________ with the following plan:        -EVEROLIMUS    -MMF 750MG BID    -PRED 5    -VALCYTE 900 (CMV+/-), NYSTATIN, BACTRIM, PROTONIX    -ABX plan:    -PMD f/u in one week, including heel ulcer management 64 year old male with PMHx of IDDM, HLD, obesity, HFpEF with mild LV diastolic dysfunction, MGUS, chronic anemia with a history of duodenal ulcer as well as GAVE and duodenal AVM s/p APC (last on 10/11/19), 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         Patient re-admitted on 6/11 with worsening hepatic encephalopathy, VRE UTI (tx with linezolid 6/15-6/22, bactrim DS 6/22-7/2), MISA/Hyponatremia, UGI bleed (melena) s/p EGD 6/22 c/w hemorrhagic duodenitis/gastritis, Grade 2EV, requiring multiple transfusions.   Also found to have non-infected heel ulcer, requiring Podiatry care.        Underwent liver transplant on 7/2/2020.         OR: Recipient hepatectomy, left groin cut down to access saphenofemoral junction for VV bypass cannulation;  Transplant: bicaval anastomosis, standard PV anastomosis, patient taken off bypass, HA  anastomosis end-end, CBD anastomosis end-end,     5Fr T-tube placed, cholangiogram performed with adequate filling in both directions -14U pRBC, 14U FFP, 10 PLT, 11 CRYO, 500mL returned from cell saver, EBL 5L, UOP 1100mL. intra-op with pressors        Recipient Info:    ABO: A    CMV:  Neg    EBV Positive        Donor:    [facility]    Donor ID:  [id number] Match: [id number]    Age: 29    ABO:  A1    High Risk:   No    COD: Cardiovascular    Medical Hx: Drug Abuse Positive cocaine, Negative: Barbituates, Methadone, Opiates, Phencyclidine, THC.      Anti CMV positive/EBV IgG- positive    HepBcAb-neg, Hepatitis C-DANA- neg, Hepatitis C ab-neg        Post transplant complicated by prolonged hospital course in the setting of VRE bacteremia and delirium, see details below.         VRE Bacteremia     - blood culture from 7/10 grew VRE Enterococcus    - Followed by transplant ID, was started on linezolid 7/10-7/23, dapto 7/23-7/26    - Follow up blood cultures with no growth to date        Delirium    - patient developed altered mental status and notable tremors around POD 3, initially presumed in setting of infection (vre bacteremia) and CNI toxicity.  CT head negative on 7/31    - Tacrolimus discontinued on 7/19 and transitioned to cyclosporine with no improvement in mental status    - Cyclosporine discontinued on 7/27 and switched to everolimus with marked improvement in mental status and tremors.        MISA    - In the setting of diuresis from anasarca. Both renal function and edema improved.        Nocturnal hypoxia    - 2/2 SHENG, required CPAP QHS with relief    - will receive home CPAP upon d/c        He maintained excellent graft function.  Was on Actigal for a short course post-op, but discontinued as LFTs remained stable.  Dopplers with patent vessels. From a surgical perspective, he did well; was downgraded from ICU care and progressed well with physical therapy. Tolerated diet, had excellent pain control and had bowel function.  All JPs x3 were removed. T-tube was tied off. Beasley was removed, passed TOV.           Medication/diet education was provided regularly by Transplant Pharmacy and Nutrition teams. Was seen daily by our multidisciplinary Transplant team of surgeons, hepatologists, pharmacists, ACPs, RNs, SW, Coordinators and deemed safe for discharge to ______________ with the following plan:        -EVEROLIMUS--------    -MMF 750MG BID    -PRED 5    -VALCYTE 900 (CMV+/-), NYSTATIN, BACTRIM, PROTONIX    -ASA 81    -adjusted insulin regimen    -heel ulcer management 64 year old male with PMHx of IDDM, HLD, obesity, HFpEF with mild LV diastolic dysfunction, MGUS, chronic anemia with a history of duodenal ulcer as well as GAVE and duodenal AVM s/p APC (last on 10/11/19), 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         Patient re-admitted on 6/11 with worsening hepatic encephalopathy, VRE UTI (tx with linezolid 6/15-6/22, bactrim DS 6/22-7/2), MISA/Hyponatremia, UGI bleed (melena) s/p EGD 6/22 c/w hemorrhagic duodenitis/gastritis, Grade 2EV, requiring multiple transfusions.   Also found to have non-infected heel ulcer, requiring Podiatry care.        Underwent liver transplant on 7/2/2020.         OR: Recipient hepatectomy, left groin cut down to access saphenofemoral junction for VV bypass cannulation;  Transplant: bicaval anastomosis, standard PV anastomosis, patient taken off bypass, HA  anastomosis end-end, CBD anastomosis end-end,     5Fr T-tube placed, cholangiogram performed with adequate filling in both directions -14U pRBC, 14U FFP, 10 PLT, 11 CRYO, 500mL returned from cell saver, EBL 5L, UOP 1100mL. intra-op with pressors        Recipient Info:    ABO: A    CMV:  Neg    EBV Positive        Donor:    [facility]    Donor ID:  [id number] Match: [id number]    Age: 29    ABO:  A1    High Risk:   No    COD: Cardiovascular    Medical Hx: Drug Abuse Positive cocaine, Negative: Barbituates, Methadone, Opiates, Phencyclidine, THC.      Anti CMV positive/EBV IgG- positive    HepBcAb-neg, Hepatitis C-DANA- neg, Hepatitis C ab-neg        Post transplant complicated by prolonged hospital course in the setting of VRE bacteremia and delirium, see details below.         VRE Bacteremia     - blood culture from 7/10 grew VRE Enterococcus    - Followed by transplant ID, was started on linezolid 7/10-7/23, dapto 7/23-7/26    - Follow up blood cultures with no growth to date        Delirium    - patient developed altered mental status and notable tremors around POD 3, initially presumed in setting of infection (vre bacteremia) and CNI toxicity.  CT head negative on 7/31    - Tacrolimus discontinued on 7/19 and transitioned to cyclosporine with no improvement in mental status    - Cyclosporine discontinued on 7/27 and switched to everolimus with marked improvement in mental status and tremors.        MISA    - In the setting of diuresis from anasarca. Both renal function and edema improved.        Nocturnal hypoxia    - 2/2 SHENG, required CPAP QHS with relief    - in patient sleep study performed 8/10 dx with severe SHENG    - will receive home Apap upon d/c        He maintained excellent graft function.  Was on Actigal for a short course post-op, but discontinued as LFTs remained stable.  Dopplers with patent vessels. From a surgical perspective, he did well; was downgraded from ICU care and progressed well with physical therapy. Tolerated diet, had excellent pain control and had bowel function.  All JPs x3 were removed. T-tube was tied off. Beasley was removed, passed TOV.  His immunosuppression was optimized, prior to discharge his WBC began to downtrend zarixo 480 mg x 1 was given. After starting everolimus Lipid panel was evaluated and remained WNL, repeat lipid panel was sent prior to discharge.            Medication/diet education was provided regularly by Transplant Pharmacy and Nutrition teams. Was seen daily by our multidisciplinary Transplant team of surgeons, hepatologists, pharmacists, ACPs, RNs, SW, Coordinators and deemed safe for discharge to home with the following plan:        -EVEROLIMUS--------    -MMF 750MG BID    -PRED 5    -VALCYTE 900 (CMV+/-), NYSTATIN, BACTRIM, PROTONIX    -ASA 81    -adjusted insulin regimen    -heel ulcer management 64 year old male with PMHx of IDDM, HLD, obesity, HFpEF with mild LV diastolic dysfunction, MGUS, chronic anemia with a history of duodenal ulcer as well as GAVE and duodenal AVM s/p APC (last on 10/11/19), 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         Patient re-admitted on 6/11 with worsening hepatic encephalopathy, VRE UTI (tx with linezolid 6/15-6/22, bactrim DS 6/22-7/2), MISA/Hyponatremia, UGI bleed (melena) s/p EGD 6/22 c/w hemorrhagic duodenitis/gastritis, Grade 2EV, requiring multiple transfusions.   Also found to have non-infected heel ulcer, requiring Podiatry care.        Underwent liver transplant on 7/2/2020.         OR: Recipient hepatectomy, left groin cut down to access saphenofemoral junction for VV bypass cannulation;  Transplant: bicaval anastomosis, standard PV anastomosis, patient taken off bypass, HA  anastomosis end-end, CBD anastomosis end-end,     5Fr T-tube placed, cholangiogram performed with adequate filling in both directions -14U pRBC, 14U FFP, 10 PLT, 11 CRYO, 500mL returned from cell saver, EBL 5L, UOP 1100mL. intra-op with pressors        Recipient Info:    ABO: A    CMV:  Neg    EBV Positive        Donor:    [facility]    Donor ID:  [id number] Match: [id number]    Age: 29    ABO:  A1    High Risk:   No    COD: Cardiovascular    Medical Hx: Drug Abuse Positive cocaine, Negative: Barbituates, Methadone, Opiates, Phencyclidine, THC.      Anti CMV positive/EBV IgG- positive    HepBcAb-neg, Hepatitis C-DANA- neg, Hepatitis C ab-neg        Post transplant complicated by prolonged hospital course in the setting of VRE bacteremia and delirium, see details below.         VRE Bacteremia     - blood culture from 7/10 grew VRE Enterococcus    - Followed by transplant ID, was started on linezolid 7/10-7/23, dapto 7/23-7/26    - Follow up blood cultures with no growth to date        Delirium    - patient developed altered mental status and notable tremors around POD 3, initially presumed in setting of infection (vre bacteremia) and CNI toxicity.  CT head negative on 7/31    - Tacrolimus discontinued on 7/19 and transitioned to cyclosporine with no improvement in mental status    - Cyclosporine discontinued on 7/27 and switched to everolimus with marked improvement in mental status and tremors.        MISA    - In the setting of diuresis from anasarca. Both renal function and edema improved.        Nocturnal hypoxia    - 2/2 SHENG, required CPAP QHS with relief    - in patient sleep study performed 8/10 dx with severe SHENG    -received home sleep apnea (Apap) machine and instructions on usage prior to d/c        He maintained excellent graft function.  Was on Actigal for a short course post-op, but discontinued as LFTs remained stable.  Dopplers with patent vessels. From a surgical perspective, he did well; was downgraded from ICU care and progressed well with physical therapy. Tolerated diet, had excellent pain control and had bowel function.  All JPs x3 were removed. T-tube was tied off. Beasley was removed, passed TOV.  He had an inpatient sleep study which demonstrated severe obstructive sleep apnea requiring a home Apap machine.  He received the machine and both him and his wife received instructions on use prior to discharge.  His immunosuppression was optimized, prior to discharge his WBC began to downtrend zarixo 480 mg x 1 was given. After starting everolimus Lipid panel was evaluated and remained WNL.            Medication/diet education was provided regularly by Transplant Pharmacy and Nutrition teams. Was seen daily by our multidisciplinary Transplant team of surgeons, hepatologists, pharmacists, ACPs, RNs, SW, Coordinators and deemed safe for discharge to home with the following plan:        -EVEROLIMUS 4.5 mg BID     - MG BID    -PRED 5    -VALCYTE 900 (CMV+/-), NYSTATIN, BACTRIM, PROTONIX    -ASA 81    -adjusted insulin regimen    -heel ulcer management    -sacral wound management     -SHENG management

## 2020-07-21 NOTE — DISCHARGE NOTE PROVIDER - NSDCFUADDAPPT_GEN_ALL_CORE_FT
1)  Please follow-up with Dr. Chris on ------.  Our office phone #: [phone] 1)  Please call and come to the Transplant Clinic on Friday 8/14 for lab work.  2)  Please follow-up with Dr. Chris on Monday 8/17.  Our office phone #: [phone]  3)  Please contact and follow up with the [facility] for any questions or concerns about your sleep apnea diagnosis [phone].  4)  Please follow up with your primary care physician concerning your hospitalization   5)  Please follow up with podiatry for heal ulcer [phone].

## 2020-07-22 LAB
ALBUMIN SERPL ELPH-MCNC: 3.1 G/DL — LOW (ref 3.3–5)
ALP SERPL-CCNC: 118 U/L — SIGNIFICANT CHANGE UP (ref 40–120)
ALT FLD-CCNC: 18 U/L — SIGNIFICANT CHANGE UP (ref 10–45)
AMMONIA BLD-MCNC: 38 UMOL/L — SIGNIFICANT CHANGE UP (ref 11–55)
ANION GAP SERPL CALC-SCNC: 8 MMOL/L — SIGNIFICANT CHANGE UP (ref 5–17)
APTT BLD: 29.3 SEC — SIGNIFICANT CHANGE UP (ref 27.5–35.5)
AST SERPL-CCNC: 14 U/L — SIGNIFICANT CHANGE UP (ref 10–40)
BILIRUB DIRECT SERPL-MCNC: 0.3 MG/DL — HIGH (ref 0–0.2)
BILIRUB INDIRECT FLD-MCNC: 0.4 MG/DL — SIGNIFICANT CHANGE UP (ref 0.2–1)
BILIRUB SERPL-MCNC: 0.7 MG/DL — SIGNIFICANT CHANGE UP (ref 0.2–1.2)
BUN SERPL-MCNC: 40 MG/DL — HIGH (ref 7–23)
CALCIUM SERPL-MCNC: 9.1 MG/DL — SIGNIFICANT CHANGE UP (ref 8.4–10.5)
CHLORIDE SERPL-SCNC: 103 MMOL/L — SIGNIFICANT CHANGE UP (ref 96–108)
CK SERPL-CCNC: 30 U/L — SIGNIFICANT CHANGE UP (ref 30–200)
CO2 SERPL-SCNC: 24 MMOL/L — SIGNIFICANT CHANGE UP (ref 22–31)
CREAT SERPL-MCNC: 1.77 MG/DL — HIGH (ref 0.5–1.3)
CYCLOSPORINE SER-MCNC: 98 NG/ML — LOW (ref 150–400)
GLUCOSE BLDC GLUCOMTR-MCNC: 129 MG/DL — HIGH (ref 70–99)
GLUCOSE BLDC GLUCOMTR-MCNC: 147 MG/DL — HIGH (ref 70–99)
GLUCOSE BLDC GLUCOMTR-MCNC: 187 MG/DL — HIGH (ref 70–99)
GLUCOSE BLDC GLUCOMTR-MCNC: 217 MG/DL — HIGH (ref 70–99)
GLUCOSE BLDC GLUCOMTR-MCNC: 245 MG/DL — HIGH (ref 70–99)
GLUCOSE SERPL-MCNC: 180 MG/DL — HIGH (ref 70–99)
HCT VFR BLD CALC: 26.5 % — LOW (ref 39–50)
HGB BLD-MCNC: 8.6 G/DL — LOW (ref 13–17)
INR BLD: 1.04 RATIO — SIGNIFICANT CHANGE UP (ref 0.88–1.16)
MAGNESIUM SERPL-MCNC: 1.8 MG/DL — SIGNIFICANT CHANGE UP (ref 1.6–2.6)
MCHC RBC-ENTMCNC: 32.5 GM/DL — SIGNIFICANT CHANGE UP (ref 32–36)
MCHC RBC-ENTMCNC: 32.5 PG — SIGNIFICANT CHANGE UP (ref 27–34)
MCV RBC AUTO: 100 FL — SIGNIFICANT CHANGE UP (ref 80–100)
NRBC # BLD: 0 /100 WBCS — SIGNIFICANT CHANGE UP (ref 0–0)
PHOSPHATE SERPL-MCNC: 3.7 MG/DL — SIGNIFICANT CHANGE UP (ref 2.5–4.5)
PLATELET # BLD AUTO: 102 K/UL — LOW (ref 150–400)
POTASSIUM SERPL-MCNC: 5.4 MMOL/L — HIGH (ref 3.5–5.3)
POTASSIUM SERPL-SCNC: 5.4 MMOL/L — HIGH (ref 3.5–5.3)
PROCALCITONIN SERPL-MCNC: 0.42 NG/ML — HIGH (ref 0.02–0.1)
PROT SERPL-MCNC: 5.5 G/DL — LOW (ref 6–8.3)
PROTHROM AB SERPL-ACNC: 12.4 SEC — SIGNIFICANT CHANGE UP (ref 10.6–13.6)
RBC # BLD: 2.65 M/UL — LOW (ref 4.2–5.8)
RBC # FLD: 18.8 % — HIGH (ref 10.3–14.5)
SODIUM SERPL-SCNC: 135 MMOL/L — SIGNIFICANT CHANGE UP (ref 135–145)
WBC # BLD: 3.79 K/UL — LOW (ref 3.8–10.5)
WBC # FLD AUTO: 3.79 K/UL — LOW (ref 3.8–10.5)

## 2020-07-22 PROCEDURE — 99232 SBSQ HOSP IP/OBS MODERATE 35: CPT | Mod: GC,24

## 2020-07-22 PROCEDURE — 99232 SBSQ HOSP IP/OBS MODERATE 35: CPT

## 2020-07-22 PROCEDURE — 99232 SBSQ HOSP IP/OBS MODERATE 35: CPT | Mod: GC

## 2020-07-22 PROCEDURE — 93010 ELECTROCARDIOGRAM REPORT: CPT

## 2020-07-22 RX ORDER — INSULIN LISPRO 100/ML
5 VIAL (ML) SUBCUTANEOUS
Refills: 0 | Status: DISCONTINUED | OUTPATIENT
Start: 2020-07-22 | End: 2020-07-24

## 2020-07-22 RX ORDER — MAGNESIUM SULFATE 500 MG/ML
1 VIAL (ML) INJECTION ONCE
Refills: 0 | Status: COMPLETED | OUTPATIENT
Start: 2020-07-22 | End: 2020-07-22

## 2020-07-22 RX ORDER — CYCLOSPORINE 100 MG/1
200 CAPSULE ORAL
Refills: 0 | Status: DISCONTINUED | OUTPATIENT
Start: 2020-07-22 | End: 2020-07-27

## 2020-07-22 RX ORDER — NYSTATIN 500MM UNIT
500000 POWDER (EA) MISCELLANEOUS
Refills: 0 | Status: DISCONTINUED | OUTPATIENT
Start: 2020-07-22 | End: 2020-08-11

## 2020-07-22 RX ORDER — CYCLOSPORINE 100 MG/1
200 CAPSULE ORAL
Refills: 0 | Status: DISCONTINUED | OUTPATIENT
Start: 2020-07-22 | End: 2020-07-22

## 2020-07-22 RX ORDER — LINEZOLID 600 MG/300ML
600 INJECTION, SOLUTION INTRAVENOUS EVERY 12 HOURS
Refills: 0 | Status: DISCONTINUED | OUTPATIENT
Start: 2020-07-22 | End: 2020-07-23

## 2020-07-22 RX ADMIN — INSULIN GLARGINE 10 UNIT(S): 100 INJECTION, SOLUTION SUBCUTANEOUS at 21:54

## 2020-07-22 RX ADMIN — Medication 2: at 11:39

## 2020-07-22 RX ADMIN — MYCOPHENOLATE MOFETIL 750 MILLIGRAM(S): 250 CAPSULE ORAL at 20:30

## 2020-07-22 RX ADMIN — Medication 500000 UNIT(S): at 11:24

## 2020-07-22 RX ADMIN — Medication 4 UNIT(S): at 08:16

## 2020-07-22 RX ADMIN — VALGANCICLOVIR 900 MILLIGRAM(S): 450 TABLET, FILM COATED ORAL at 11:25

## 2020-07-22 RX ADMIN — CHLORHEXIDINE GLUCONATE 1 APPLICATION(S): 213 SOLUTION TOPICAL at 09:30

## 2020-07-22 RX ADMIN — Medication 2: at 17:17

## 2020-07-22 RX ADMIN — TAMSULOSIN HYDROCHLORIDE 0.8 MILLIGRAM(S): 0.4 CAPSULE ORAL at 21:53

## 2020-07-22 RX ADMIN — Medication 100 GRAM(S): at 09:31

## 2020-07-22 RX ADMIN — Medication 4 UNIT(S): at 11:40

## 2020-07-22 RX ADMIN — MYCOPHENOLATE MOFETIL 750 MILLIGRAM(S): 250 CAPSULE ORAL at 08:16

## 2020-07-22 RX ADMIN — Medication 500000 UNIT(S): at 23:12

## 2020-07-22 RX ADMIN — Medication 1 TABLET(S): at 11:24

## 2020-07-22 RX ADMIN — Medication 5 UNIT(S): at 17:17

## 2020-07-22 RX ADMIN — NYSTATIN CREAM 1 APPLICATION(S): 100000 CREAM TOPICAL at 17:07

## 2020-07-22 RX ADMIN — Medication 500000 UNIT(S): at 05:54

## 2020-07-22 RX ADMIN — INSULIN GLARGINE 10 UNIT(S): 100 INJECTION, SOLUTION SUBCUTANEOUS at 00:04

## 2020-07-22 RX ADMIN — Medication 3 MILLIGRAM(S): at 00:05

## 2020-07-22 RX ADMIN — Medication 1 APPLICATION(S): at 11:25

## 2020-07-22 RX ADMIN — Medication 81 MILLIGRAM(S): at 11:25

## 2020-07-22 RX ADMIN — CYCLOSPORINE 200 MILLIGRAM(S): 100 CAPSULE ORAL at 20:31

## 2020-07-22 RX ADMIN — Medication 3 MILLIGRAM(S): at 21:52

## 2020-07-22 RX ADMIN — QUETIAPINE FUMARATE 25 MILLIGRAM(S): 200 TABLET, FILM COATED ORAL at 17:07

## 2020-07-22 RX ADMIN — CYCLOSPORINE 150 MILLIGRAM(S): 100 CAPSULE ORAL at 09:31

## 2020-07-22 RX ADMIN — Medication 500000 UNIT(S): at 17:07

## 2020-07-22 RX ADMIN — NYSTATIN CREAM 1 APPLICATION(S): 100000 CREAM TOPICAL at 05:56

## 2020-07-22 RX ADMIN — PANTOPRAZOLE SODIUM 40 MILLIGRAM(S): 20 TABLET, DELAYED RELEASE ORAL at 06:01

## 2020-07-22 NOTE — PROGRESS NOTE ADULT - SUBJECTIVE AND OBJECTIVE BOX
Patient seen and examined.  Much more alert today.  Interactive and appropriate.    MEDICATIONS  (STANDING):  aspirin enteric coated 81 milliGRAM(s) Oral daily  chlorhexidine 2% Cloths 1 Application(s) Topical <User Schedule>  collagenase Ointment 1 Application(s) Topical daily  cycloSPORINE  , modified (GENGRAF) 150 milliGRAM(s) Oral <User Schedule>  insulin glargine Injectable (LANTUS) 10 Unit(s) SubCutaneous at bedtime  insulin lispro (HumaLOG) corrective regimen sliding scale   SubCutaneous three times a day before meals  insulin lispro (HumaLOG) corrective regimen sliding scale   SubCutaneous at bedtime  insulin lispro Injectable (HumaLOG) 4 Unit(s) SubCutaneous three times a day before meals  linezolid  IVPB 600 milliGRAM(s) IV Intermittent every 12 hours  magnesium oxide 800 milliGRAM(s) Oral two times a day with meals  melatonin 3 milliGRAM(s) Oral at bedtime  mycophenolate mofetil 750 milliGRAM(s) Oral <User Schedule>  nystatin    Suspension 961376 Unit(s) Oral four times a day  nystatin Cream 1 Application(s) Topical two times a day  pantoprazole    Tablet 40 milliGRAM(s) Oral before breakfast  predniSONE   Tablet 5 milliGRAM(s) Oral daily  QUEtiapine 25 milliGRAM(s) Oral <User Schedule>  tamsulosin 0.8 milliGRAM(s) Oral at bedtime  trimethoprim   80 mG/sulfamethoxazole 400 mG 1 Tablet(s) Oral daily  valGANciclovir 900 milliGRAM(s) Oral daily    MEDICATIONS  (PRN):    Vital Signs Last 24 Hrs  T(C): 36.6 (22 Jul 2020 10:28), Max: 36.9 (21 Jul 2020 18:00)  T(F): 97.9 (22 Jul 2020 10:28), Max: 98.5 (21 Jul 2020 22:00)  HR: 81 (22 Jul 2020 10:28) (79 - 98)  BP: 130/66 (22 Jul 2020 10:28) (100/54 - 132/63)  BP(mean): 91 (22 Jul 2020 05:00) (91 - 91)  RR: 20 (22 Jul 2020 10:28) (17 - 20)  SpO2: 100% (22 Jul 2020 10:28) (95% - 100%)      PHYSICAL EXAM  Constitutional: NAD, Comfortable  HEENT: NCAT, EOMI  Neck: Supple, No limited ROM  Chest: Breathing comfortably, No wheezing  Cardiovascular: S1S2   Abdomen: Soft   Extremities: 1+ LE edema, no calf pain.   Neurologic Exam:                 Alert  Follows verbal instruction  Able to name current President but not prior one.  Motor non-focal and antigravity  ST mem 1/3.  + tremor                                              8.6    3.79  )-----------( 102      ( 22 Jul 2020 06:46 )             26.5       07-22    135  |  103  |  40<H>  ----------------------------<  180<H>  5.4<H>   |  24  |  1.77<H>    Ca    9.1      22 Jul 2020 06:46  Phos  3.7     07-22  Mg     1.8     07-22    TPro  5.5<L>  /  Alb  3.1<L>  /  TBili  0.7  /  DBili  0.3<H>  /  AST  14  /  ALT  18  /  AlkPhos  118  07-22        ASSESSMENT/PLAN  64y Male with functional deficits after complicated and prolonged hospital course due to decompensated cirrhosis now s/p liver transplant 7/2.   - Continue PT/OT  - Skin- Turn q2h, check skin daily  - DVT px- SCDs  - Monitor K+  - Much improved- recommend Acute Rehab- can tolerate 3h/d PT/OT/SLP and requires daily physician visits

## 2020-07-22 NOTE — PROGRESS NOTE ADULT - PROBLEM SELECTOR PLAN 3
defer statin in the setting of recent liver x-plant.    call w/any questions  pager: [phone]   cell: [phone]  office: [phone]    -G spent a total time of  25 mins with the patient at bedside/on unit of which more than 50% of time was spent on counseling/coordination of care.

## 2020-07-22 NOTE — PROGRESS NOTE ADULT - SUBJECTIVE AND OBJECTIVE BOX
Transplant Surgery - Multidisciplinary Rounds  --------------------------------------------------------------  OLTx      Date:  7/2/2020       POD#20    Present:  Patient seen with multidisciplinary team including Transplant Surgeon: Dr. Gray. Transplant Nephrologist: Dr. FAY Patel, and renal fellow, Pharmacist: OLY Savage, PGY 3 Dr. Quezada, and  RN manager during am rounds and examined with Dr. Gray. Disciplines not in attendance will be notified of the plan.     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     Re-admitted on 6/11:   ·	worsening HE  ·	UTI/VRE: tx with linezolid 6/15-22, bactrim DS 6/22 - 7/2  ·	MISA/Hyponatremia  ·	UGI bleed (melena) s/p EGD (6/22) c/w hemorrhagic duodenitis/gastritis; Grade 2 EV; requiring multiple transfusions  ·	Known h/o R foot ulcer (MRI neg for OM)  ·	s/p OLTx on 7/2/2020, post op liver doppler with patent vessels  ·	post op course c/b delirium and VRE bacteremia (7/10)     Interval events:  - POD 20 s/p OLT with good graft function;  LFTs stable  - Mental status same: confused, + tremors, out of bed to chair this am  - Renal function: SrCr 1.7, u/o 1.5L  - sacral decub noted on exam; wound care consult ordered     Potential Discharge date: pending clinical improvement     Education:  Medications    Plan of care:  See Below    MEDICATIONS  (STANDING):  aspirin enteric coated 81 milliGRAM(s) Oral daily  chlorhexidine 2% Cloths 1 Application(s) Topical <User Schedule>  collagenase Ointment 1 Application(s) Topical daily  cycloSPORINE  , modified (GENGRAF) 150 milliGRAM(s) Oral <User Schedule>  insulin glargine Injectable (LANTUS) 10 Unit(s) SubCutaneous at bedtime  insulin lispro (HumaLOG) corrective regimen sliding scale   SubCutaneous three times a day before meals  insulin lispro (HumaLOG) corrective regimen sliding scale   SubCutaneous at bedtime  insulin lispro Injectable (HumaLOG) 4 Unit(s) SubCutaneous three times a day before meals  linezolid  IVPB 600 milliGRAM(s) IV Intermittent every 12 hours  magnesium oxide 800 milliGRAM(s) Oral two times a day with meals  melatonin 3 milliGRAM(s) Oral at bedtime  mycophenolate mofetil 750 milliGRAM(s) Oral <User Schedule>  nystatin    Suspension 302012 Unit(s) Oral four times a day  nystatin Cream 1 Application(s) Topical two times a day  pantoprazole    Tablet 40 milliGRAM(s) Oral before breakfast  predniSONE   Tablet 5 milliGRAM(s) Oral daily  QUEtiapine 25 milliGRAM(s) Oral <User Schedule>  tamsulosin 0.8 milliGRAM(s) Oral at bedtime  trimethoprim   80 mG/sulfamethoxazole 400 mG 1 Tablet(s) Oral daily  valGANciclovir 900 milliGRAM(s) Oral daily    PAST MEDICAL & SURGICAL HISTORY:  GIB (gastrointestinal bleeding)  GERD with esophagitis: Gastritis &amp; Non Bleeding Ulcers  Hepatic encephalopathy  Obesity  Fatty liver disease, nonalcoholic  Renal stones: 25 years ago  Hypertension  Neuropathy  Hypercholesteremia  Diabetes  S/P cholecystectomy    Vital Signs Last 24 Hrs  T(C): 36.7 (22 Jul 2020 05:00), Max: 36.9 (21 Jul 2020 18:00)  T(F): 98 (22 Jul 2020 05:00), Max: 98.5 (21 Jul 2020 22:00)  HR: 81 (22 Jul 2020 05:00) (79 - 98)  BP: 132/63 (22 Jul 2020 05:00) (100/54 - 132/63)  BP(mean): 91 (22 Jul 2020 05:00) (91 - 91)  RR: 18 (22 Jul 2020 05:00) (17 - 18)  SpO2: 100% (22 Jul 2020 05:00) (95% - 100%)    I&O's Summary    21 Jul 2020 07:01 - 22 Jul 2020 07:00  --------------------------------------------------------  IN: 1370 mL / OUT: 1500 mL / NET: -130 mL                         8.6    3.79  )-----------( 102      ( 22 Jul 2020 06:46 )             26.5     07-22    135  |  103  |  40<H>  ----------------------------<  180<H>  5.4<H>   |  24  |  1.77<H>    Ca    9.1      22 Jul 2020 06:46  Phos  3.7     07-22  Mg     1.8     07-22    TPro  5.5<L>  /  Alb  3.1<L>  /  TBili  0.7  /  DBili  0.3<H>  /  AST  14  /  ALT  18  /  AlkPhos  118  07-22    Tacrolimus (), Serum: 2.7 ng/mL (07-21 @ 06:45)    cxs:   Culture - Blood (collected 07-20-20 @ 23:18)  Source: .Blood Blood  Preliminary Report (07-22-20 @ 01:01):    No growth to date.    Culture - Blood (collected 07-20-20 @ 23:18)  Source: .Blood Blood  Preliminary Report (07-22-20 @ 01:01):    No growth to date.    Culture - Urine (collected 07-20-20 @ 15:23)  Source: .Urine Clean Catch (Midstream)  Final Report (07-21-20 @ 13:20):    No growth    Review of systems  Gen: weakness  Skin: No rashes  Head/Eyes/Ears/Mouth: No headache; Normal hearing; Normal vision w/o blurriness; No sinus pain/discomfort, sore throat  Respiratory: No dyspnea, cough, wheezing, hemoptysis  CV: No chest pain, PND, orthopnea  GI: Mild abdominal pain at surgical incision site; denies diarrhea, constipation, nausea, vomiting, melena, hematochezia  : No increased frequency, dysuria, hematuria, nocturia  MSK: No joint pain/swelling; no back pain; no edema, RT groin incision with staples intact   Neuro: No dizziness/lightheadedness, weakness, seizures, numbness, tingling  Heme: No easy bruising or bleeding  Endo: No heat/cold intolerance  Psych: Restless   All other systems were reviewed and are negative, except as noted.      PHYSICAL EXAM:  Constitutional: AMS, + tremors  Eyes: Anicteric, PERRLA  ENMT: nc/at  Neck: no central line, no JVD  Respiratory: CTA B/L, unlabored breathing   Cardiovascular: RRR  Gastrointestinal: soft, NT, ND, incision c/d/i, T-tube tied   Genitourinary: Voiding spontaneously  Extremities: heel wound without drainage or purulent material, edema improving   Vascular: Palpable dp pulses bilaterally  Neurological: Confused with easy reorientation, +mild tremors, following commands  Skin: no rashes  Musculoskeletal: Moving all extremities  Psychiatric: Responsive

## 2020-07-22 NOTE — PROGRESS NOTE ADULT - ASSESSMENT
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 s/p treatment course for possible VRE UTI. Now s/p OLT on 7/2/20. Course complicated by Encephalopathy and Tremors. Blood Cultures (7/10) with VRE sensitive to Linezolid. CT C/A/P without obvious intraabdominal source. Hypoxia prior to positive BCx which may be related to sepsis CT Chest (7/11) without evidence of pneumonia. Had L groin fluid collection which appears to be consistent with seroma - sample obtained for culture also negative. No drainage, erythema or underlying fluctuance was appreciated at the right heel. MRI prior to transplant without obvious infection. Transferred from SICU to onti on 7/17/2020.    Currently on:  linezolid  IVPB 600 every 12 hours  nystatin    Suspension 441865 four times a day  trimethoprim   80 mG/sulfamethoxazole 400 mG 1 daily  valGANciclovir 900 daily    #VRE Bacteremia, L Groin Collection, Encephalopathy  - On Linezolid now  - Abd US showed 9cm Left groin collection: likely represents a seroma or lymphocele; aspirated - cx: PMN seen, no organism seen  - Continue Linezolid 600 mg IV Q12H (2 weeks from - BCx, End Date: 7/26), monitor CBC  - Continue monitoring tacrolimus levels  - TTE negative for endocarditis  - UA/UC on 7/20 no UTI  - BCx on 7/20 NGTD    #Pancytopenia  - If worsening cytopenias CAN switch Linezolid to Daptomycin (or as alternate can hold Valcyte)    #Hypoxic Respiratory Failure, Encephalopathy  - No evidence of pneumonia and collection in left groin appears consistent with seroma  - now off the Meropenem    #s/p OLT   - Continue bactrim, valcyte (CMV D+/R-) for prophylaxis   - Monitor patient for tremors - monitor tacrolimus dose to make sure trending down, otherwise now on cylosporine    #Right heel wound  --Podiatry on board    --Local wound care     Suyapa Mayer MD, PGY4  Fellow, Infectious Diseases  Pager: [phone]  If no response, after 5pm and on Weekends: Call [phone]    d/w Dr. Pérez

## 2020-07-22 NOTE — PROGRESS NOTE ADULT - ASSESSMENT
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    [] POD 20 s/p OLT   - Good graft function, LFTs stable   - Immuno: off tacro as of 7/19 , Cyclo 150mg bid (will adjust by level), MMF 750mg bid; Pred 5mg daily  - PPX: valcyte/nystatin/bactrim  - Continue ASA 81mg daily  - Electrolytes: low K diet, keep Mag >2, continue PO supplementation, Mag 1g IV today   - h/o UGI bleed: Protonix 40mg daily   - Pain control PRN  - psych recs appreciated. Remeron and Zyprexa D/C ed yesterday given periods of lethargy, continue Seroquel. Check EKG today  - DVT PPx: SCDs at all times  - Sacral decub: wound care consult    [] VRE bacteremia (7/10)  - ID following: on linezolid, end date 7/26  - Bld cx from 7/20 with NGTD, urine cx (7/20) with NG     [] Hyperglycemia  - Appreciate endocrinology recommendations  - Cont Lantus and Humalog insulin wih SSI coverage  - Fingersticks ac and qhs    [] Heel ulcer  - Continue local wound care w/ santyl and DSD as per podiatry    [] Dispo:   - continue 1:1 with tele  - F/U PT recs for possible Discharge to acute vs [facility]  - SW with tx team working with pt & family on DC plan

## 2020-07-22 NOTE — PROGRESS NOTE ADULT - SUBJECTIVE AND OBJECTIVE BOX
INTERVAL HPI/OVERNIGHT EVENTS:    events noted  tolerating diet  having bowel movements  remains confused        MEDICATIONS  (STANDING):  aspirin enteric coated 81 milliGRAM(s) Oral daily  chlorhexidine 2% Cloths 1 Application(s) Topical <User Schedule>  collagenase Ointment 1 Application(s) Topical daily  furosemide    Tablet 80 milliGRAM(s) Oral every 8 hours  insulin glargine Injectable (LANTUS) 16 Unit(s) SubCutaneous at bedtime  insulin lispro (HumaLOG) corrective regimen sliding scale   SubCutaneous three times a day before meals  insulin lispro (HumaLOG) corrective regimen sliding scale   SubCutaneous at bedtime  insulin lispro Injectable (HumaLOG) 10 Unit(s) SubCutaneous before dinner  insulin lispro Injectable (HumaLOG) 8 Unit(s) SubCutaneous before breakfast  insulin lispro Injectable (HumaLOG) 8 Unit(s) SubCutaneous before lunch  linezolid  IVPB 600 milliGRAM(s) IV Intermittent every 12 hours  magnesium oxide 800 milliGRAM(s) Oral two times a day with meals  meropenem  IVPB 1000 milliGRAM(s) IV Intermittent every 8 hours  nystatin    Suspension 906227 Unit(s) Swish and Swallow four times a day  nystatin Cream 1 Application(s) Topical two times a day  pantoprazole  Injectable 40 milliGRAM(s) IV Push every 12 hours  polyethylene glycol 3350 17 Gram(s) Oral daily  predniSONE   Tablet 5 milliGRAM(s) Oral daily  senna 2 Tablet(s) Oral at bedtime  tacrolimus 3 milliGRAM(s) Oral <User Schedule>  tacrolimus 2 milliGRAM(s) Oral <User Schedule>  tamsulosin 0.8 milliGRAM(s) Oral at bedtime  trimethoprim   80 mG/sulfamethoxazole 400 mG 1 Tablet(s) Oral daily  valGANciclovir 450 milliGRAM(s) Oral daily    MEDICATIONS  (PRN):  glucagon  Injectable 1 milliGRAM(s) IntraMuscular once PRN Glucose <70 milliGRAM(s)/deciLiter      Allergies    codeine (Anaphylaxis)    Intolerances        Review of Systems:    General:  No wt loss, fevers, chills, night sweats,fatigue,   Eyes:  Good vision, no reported pain  ENT:  No sore throat, pain, runny nose, dysphagia  CV:  No pain, palpitatioins, hypo/hypertension  Resp:  No dyspnea, cough, tachypnea, wheezing  GI:  No pain, No nausea, No vomiting, No diarrhea, No constipatiion, No weight loss, No fever, No pruritis, No rectal bleeding, No tarry stools, No dysphagia,  :  No pain, bleeding, incontinence, nocturia  Muscle:  No pain, weakness  Neuro:  No weakness, tingling, memory problems  Psych:  No fatigue, insomnia, mood problems, depression  Endocrine:  No polyuria, polydypsia, cold/heat intolerance  Heme:  No petechiae, ecchymosis, easy bruisability  Skin:  No rash, tattoos, scars, edema      Vital Signs Last 24 Hrs  T(C): 36.3 (12 Jul 2020 15:00), Max: 36.8 (11 Jul 2020 19:00)  T(F): 97.3 (12 Jul 2020 15:00), Max: 98.2 (11 Jul 2020 19:00)  HR: 80 (12 Jul 2020 15:00) (72 - 83)  BP: 109/59 (12 Jul 2020 15:00) (94/55 - 154/68)  BP(mean): 78 (12 Jul 2020 15:00) (70 - 125)  RR: 15 (12 Jul 2020 15:00) (9 - 28)  SpO2: 96% (12 Jul 2020 15:00) (96% - 100%)    PHYSICAL EXAM:    Constitutional: NAD  HEENT: sclera anicteric   Neck: No LAD, supple  Gastrointestinal: BS+, soft, ND, +incisional tenderness, incisions c/d/i  Extremities: +b/l peripheral edema  Neurological: awake, alert, +tremors - improving     LABS:                        9.3    5.51  )-----------( 98       ( 12 Jul 2020 01:54 )             28.6     07-12    131<L>  |  95<L>  |  31<H>  ----------------------------<  217<H>  5.0   |  27  |  1.26    Ca    8.1<L>      12 Jul 2020 12:22  Phos  4.0     07-12  Mg     2.0     07-12    TPro  4.4<L>  /  Alb  2.6<L>  /  TBili  1.1  /  DBili  0.5<H>  /  AST  64<H>  /  ALT  98<H>  /  AlkPhos  212<H>  07-12    PT/INR - ( 12 Jul 2020 01:54 )   PT: 12.7 sec;   INR: 1.12 ratio         PTT - ( 12 Jul 2020 01:54 )  PTT:28.7 sec      RADIOLOGY & ADDITIONAL TESTS:

## 2020-07-22 NOTE — PROGRESS NOTE BEHAVIORAL HEALTH - NSBHCONSULTRECOMMENDOTHER_PSY_A_CORE FT
1) consider d/c seroquel, and start standing haldol 1 mg IV QHS for confusion/agitation, and haldol 1 mg po/im/iv BID PRN for acute agitation, f/u QTc < 500    2) no SSRIs please, as pt has been on zyvox for past 10-14 days, concern for serotonin syndrome if SSRIs added    3) consider melatonin 3 mg qhs    4) Minimize use of benzos, opioids, anticholinergics, or other deliriogenic agents when possible.  Maintain sleep wake cycle.  Provide frequent reorientation and redirection.  Family member at bedside if possible. Assess for need for glasses and hearing aid (if applicable).    5) Pt does not meet criteria for psychiatric hospitalization.  Recommend outpt psych f/u at [facility] after d/c- [phone].   CL Psych will follow.
1. Minimize use of benzos, opioids, anticholinergics, or other deliriogenic agents when possible.  Maintain sleep wake cycle.  Provide frequent reorientation and redirection.  Family member at bedside if possible. Assess for need for glasses and hearing aid (if applicable).    2. Pt does not meet criteria for psychiatric hospitalization.  Recommend outpt psych f/u at [facility] after d/c- [phone].   CL Psych will follow.

## 2020-07-22 NOTE — PROGRESS NOTE ADULT - PROBLEM SELECTOR PLAN 1
-test BG AC/HS  -c/w Lantus 10 units QHS  -Increase Humalog 5 units AC meals  -c/w Humalog low correction scale AC and Low HS scale  DISPO: basal/bolus, with doses TBD  -Pt can resume f/u with Dr. Mathur or   [facility] [address] [place] [address] [address] [place] [phone].

## 2020-07-22 NOTE — PROGRESS NOTE ADULT - SUBJECTIVE AND OBJECTIVE BOX
Diabetes Follow up note:    Chief complaint: T2DM s/p liver xplant    Interval Hx: BG values mostly at goal on basal/bolus regimen. Pt seen at bedside w/1:1 present at bedside. Had agitation overnight but calm when seen at bedside. Reports very good appetite today. Will need acute rehab at discharge.     Review of Systems:  General: no complaints.   GI: Tolerating POs. Denies N/V/D/Abd pain  CV: Denies CP/SOB  ENDO: No S&Sx of hypoglycemia  MEDS:  insulin glargine Injectable (LANTUS) 10 Unit(s) SubCutaneous at bedtime  insulin lispro (HumaLOG) corrective regimen sliding scale   SubCutaneous three times a day before meals  insulin lispro (HumaLOG) corrective regimen sliding scale   SubCutaneous at bedtime  insulin lispro Injectable (HumaLOG) 4 Unit(s) SubCutaneous three times a day before meals  predniSONE   Tablet 5 milliGRAM(s) Oral daily    linezolid  IVPB 600 milliGRAM(s) IV Intermittent every 12 hours  nystatin    Suspension 844032 Unit(s) Oral four times a day  trimethoprim   80 mG/sulfamethoxazole 400 mG 1 Tablet(s) Oral daily  valGANciclovir 900 milliGRAM(s) Oral daily    Allergies    codeine (Anaphylaxis)      PE:  General: Male lying in bed. NAD.   Vital Signs Last 24 Hrs  T(C): 36.2 (22 Jul 2020 13:11), Max: 36.9 (21 Jul 2020 18:00)  T(F): 97.2 (22 Jul 2020 13:11), Max: 98.5 (21 Jul 2020 22:00)  HR: 83 (22 Jul 2020 13:11) (79 - 98)  BP: 107/59 (22 Jul 2020 13:11) (100/54 - 132/63)  BP(mean): 91 (22 Jul 2020 05:00) (91 - 91)  RR: 20 (22 Jul 2020 13:11) (18 - 20)  SpO2: 97% (22 Jul 2020 13:11) (97% - 100%)  Abd: Soft, NT,ND, Obese.   Extremities: Warm  Neuro: A&O X1-2. + tremors    LABS:  POCT Blood Glucose.: 245 mg/dL (07-22-20 @ 11:38)  POCT Blood Glucose.: 147 mg/dL (07-22-20 @ 08:04)  POCT Blood Glucose.: 187 mg/dL (07-21-20 @ 23:21)  POCT Blood Glucose.: 170 mg/dL (07-21-20 @ 21:28)  POCT Blood Glucose.: 161 mg/dL (07-21-20 @ 17:00)  POCT Blood Glucose.: 149 mg/dL (07-21-20 @ 12:22)  POCT Blood Glucose.: 168 mg/dL (07-21-20 @ 08:21)  POCT Blood Glucose.: 170 mg/dL (07-20-20 @ 21:27)  POCT Blood Glucose.: 110 mg/dL (07-20-20 @ 17:46)  POCT Blood Glucose.: 148 mg/dL (07-20-20 @ 13:04)  POCT Blood Glucose.: 133 mg/dL (07-20-20 @ 08:11)  POCT Blood Glucose.: 92 mg/dL (07-19-20 @ 22:11)  POCT Blood Glucose.: 76 mg/dL (07-19-20 @ 21:37)  POCT Blood Glucose.: 157 mg/dL (07-19-20 @ 17:11)                            8.6    3.79  )-----------( 102      ( 22 Jul 2020 06:46 )             26.5       07-22    135  |  103  |  40<H>  ----------------------------<  180<H>  5.4<H>   |  24  |  1.77<H>    Ca    9.1      22 Jul 2020 06:46  Phos  3.7     07-22  Mg     1.8     07-22    TPro  5.5<L>  /  Alb  3.1<L>  /  TBili  0.7  /  DBili  0.3<H>  /  AST  14  /  ALT  18  /  AlkPhos  118  07-22        A1C with Estimated Average Glucose Result: 5.2 % (06-14-20 @ 11:00)  A1C with Estimated Average Glucose Result: 5.4 % (06-12-20 @ 08:26)  A1C with Estimated Average Glucose Result: 4.8 % (04-26-20 @ 09:39)  Hemoglobin A1C, Whole Blood: 6.4 % (02-12-20 @ 17:08)  Hemoglobin A1C, Whole Blood: 5.8 % (12-04-19 @ 08:38)          Contact number: beeper [phone] or [phone]

## 2020-07-22 NOTE — PROGRESS NOTE ADULT - ATTENDING COMMENTS
64M PMH JEAN cirrhosis s/p OLT on 7/2/20  Post-Op Course Complicated by Encephalopathy and Tremors  Blood Cultures (7/10) with VRE  CT C/A/P (7/11) without obvious intraabdominal source.  Hypoxia prior to positive BCx which may be related to sepsis   Had L groin fluid collection which appears to be consistent with seroma - sample obtained for culture  Would continue Linezolid for 2 weeks from negative BCx  TTE limited but without obvious vegetations    Continued encephalopathy and tremor (?Tacrolimus-induced)  U/A from 7/20 without pyuria  Blood cultures sent and pending - NGTD    CMV D+/R- (high risk) - increased to Valcyte 900 mg PO Q24H    I will continue to follow. Please feel free to contact me with any further questions.    Eusebio Pérez M.D.  [facility] Division of Infectious Disease  8AM-5PM: Pager Number [phone]  After Hours (or if no response): Please contact the Infectious Diseases Office at [phone]

## 2020-07-22 NOTE — PROGRESS NOTE ADULT - SUBJECTIVE AND OBJECTIVE BOX
Chief Complaint:  Patient is a 64y old  Male who presents with a chief complaint of Liver failure, hepatic encephalopathy (2020 13:54)      Interval Events: Patient with agitation and difficult sleeping at night. Lethargic during the day. Denies any pain, n/v.    Allergies:  codeine (Anaphylaxis)      Hospital Medications:  aspirin enteric coated 81 milliGRAM(s) Oral daily  chlorhexidine 2% Cloths 1 Application(s) Topical <User Schedule>  collagenase Ointment 1 Application(s) Topical daily  insulin glargine Injectable (LANTUS) 10 Unit(s) SubCutaneous at bedtime  insulin lispro (HumaLOG) corrective regimen sliding scale   SubCutaneous three times a day before meals  insulin lispro (HumaLOG) corrective regimen sliding scale   SubCutaneous at bedtime  insulin lispro Injectable (HumaLOG) 4 Unit(s) SubCutaneous three times a day before meals  linezolid  IVPB 600 milliGRAM(s) IV Intermittent every 12 hours  magnesium oxide 800 milliGRAM(s) Oral two times a day with meals  melatonin 3 milliGRAM(s) Oral at bedtime  mycophenolate mofetil 750 milliGRAM(s) Oral <User Schedule>  nystatin    Suspension 353956 Unit(s) Oral four times a day  nystatin Cream 1 Application(s) Topical two times a day  pantoprazole    Tablet 40 milliGRAM(s) Oral before breakfast  predniSONE   Tablet 5 milliGRAM(s) Oral daily  QUEtiapine 25 milliGRAM(s) Oral <User Schedule>  tamsulosin 0.8 milliGRAM(s) Oral at bedtime  trimethoprim   80 mG/sulfamethoxazole 400 mG 1 Tablet(s) Oral daily  valGANciclovir 900 milliGRAM(s) Oral daily      PMHX/PSHX:  GIB (gastrointestinal bleeding)  GERD with esophagitis  Hepatic encephalopathy  Obesity  Fatty liver disease, nonalcoholic  Renal stones  Hypertension  Neuropathy  Hypercholesteremia  Diabetes  S/P cholecystectomy  No significant past surgical history      Family history:  Family history of type 2 diabetes mellitus  Family history of hypertension  Family history of stomach cancer  No pertinent family history in first degree relatives      ROS: As per HPI, 14-point ROS negative otherwise.    General:  No wt loss, fevers, chills, night sweats, fatigue,   Eyes:  Good vision, no reported pain  ENT:  No sore throat, pain, runny nose, dysphagia  CV:  No pain, palpitations, hypo/hypertension  Resp:  No dyspnea, cough, tachypnea, wheezing  GI:  See HPI  :  No pain, bleeding, incontinence, nocturia  Muscle:  No pain, weakness  Neuro:  No weakness, tingling, memory problems  Psych:  No fatigue, insomnia, mood problems, depression  Endocrine:  No polyuria, polydipsia, cold/heat intolerance  Heme:  No petechiae, ecchymosis, easy bruisability  Skin:  No rash, edema      PHYSICAL EXAM:     Vital Signs:  Vital Signs Last 24 Hrs  T(C): 36.2 (2020 13:11), Max: 36.9 (2020 18:00)  T(F): 97.2 (2020 13:11), Max: 98.5 (2020 22:00)  HR: 83 (2020 13:11) (79 - 98)  BP: 107/59 (2020 13:11) (100/54 - 132/63)  BP(mean): 91 (2020 05:00) (91 - 91)  RR: 20 (2020 13:11) (18 - 20)  SpO2: 97% (2020 13:11) (97% - 100%)  Daily     Daily Weight in k.6 (2020 06:00)    GENERAL:  appears comfortable, no acute distress, confused  HEENT:  NC/AT,  conjunctivae clear, sclera -anicteric  CHEST:  no increased effort  HEART:  Regular rate and rhythm  ABDOMEN:  Soft, non-tender, non-distended,  no masses, surgical scar noted with minimal tenderness  EXTREMITIES:  trace edema  SKIN:   sacral decubitus ulcer noted  NEURO:  Alert, orientedx2, unable to name people in room, intermittently sleepingduring exam    LABS:                        8.6    3.79  )-----------( 102      ( 2020 06:46 )             26.5     07-22    135  |  103  |  40<H>  ----------------------------<  180<H>  5.4<H>   |  24  |  1.77<H>    Ca    9.1      2020 06:46  Phos  3.7     07-22  Mg     1.8     07-22    TPro  5.5<L>  /  Alb  3.1<L>  /  TBili  0.7  /  DBili  0.3<H>  /  AST  14  /  ALT  18  /  AlkPhos  118  07-22    LIVER FUNCTIONS - ( 2020 06:46 )  Alb: 3.1 g/dL / Pro: 5.5 g/dL / ALK PHOS: 118 U/L / ALT: 18 U/L / AST: 14 U/L / GGT: x           PT/INR - ( 2020 06:54 )   PT: 12.4 sec;   INR: 1.04 ratio         PTT - ( 2020 06:54 )  PTT:29.3 sec    Amylase Serum--      Lipase serum--       [id number]      Imaging:

## 2020-07-22 NOTE — PROGRESS NOTE ADULT - SUBJECTIVE AND OBJECTIVE BOX
Follow Up:      Interval History/ROS:Patient is a 64y old  Male who presents with a chief complaint of Liver failure, hepatic encephalopathy (22 Jul 2020 14:17)  - Afebrile, same mental status and tremor  - BCx on 7/20 NGTD  - Cyclosporine Level=98 (07.22.20 @ 10:25)    Allergies  codeine (Anaphylaxis)    ANTIMICROBIALS:    linezolid  IVPB 600 every 12 hours  nystatin    Suspension 846435 four times a day  trimethoprim   80 mG/sulfamethoxazole 400 mG 1 daily  valGANciclovir 900 daily    OTHER MEDS: MEDICATIONS  (STANDING):  aspirin enteric coated 81 daily  cycloSPORINE  , modified (GENGRAF) 200 <User Schedule>  insulin glargine Injectable (LANTUS) 10 at bedtime  insulin lispro (HumaLOG) corrective regimen sliding scale  three times a day before meals  insulin lispro (HumaLOG) corrective regimen sliding scale  at bedtime  insulin lispro Injectable (HumaLOG) 4 three times a day before meals  melatonin 3 at bedtime  mycophenolate mofetil 750 <User Schedule>  pantoprazole    Tablet 40 before breakfast  predniSONE   Tablet 5 daily  QUEtiapine 25 <User Schedule>  tamsulosin 0.8 at bedtime      Vital Signs Last 24 Hrs  T(F): 97.2 (07-22-20 @ 13:11), Max: 98.9 (07-20-20 @ 21:00)    Vital Signs Last 24 Hrs  HR: 83 (07-22-20 @ 13:11) (79 - 98)  BP: 107/59 (07-22-20 @ 13:11) (100/54 - 132/63)  RR: 20 (07-22-20 @ 13:11)  SpO2: 97% (07-22-20 @ 13:11) (97% - 100%)  Wt(kg): --    EXAM:  General: Awake but not alert, +tremor  HEENT: PERRL, EOMI  Neck: Supple  Cardiac: RRR, No M/R/G  Resp: CTAB, No Wh/Rh/Ra  Abdomen: OLT staple line site is clean without surrounding erythema or drainage, No HSM, No rigidity or guarding  MSK: No LE edema. No Calf tenderness  : No bob  Skin: No rashes or lesions. Skin is warm and dry to the touch.   Neuro: Awake but not alert. CN 2-12 Grossly intact. Moves all four extremities spontaneously.  Psych: Encephalopathic - unable to assess    Labs:                        8.6    3.79  )-----------( 102      ( 22 Jul 2020 06:46 )             26.5     07-22    135  |  103  |  40<H>  ----------------------------<  180<H>  5.4<H>   |  24  |  1.77<H>    Ca    9.1      22 Jul 2020 06:46  Phos  3.7     07-22  Mg     1.8     07-22    TPro  5.5<L>  /  Alb  3.1<L>  /  TBili  0.7  /  DBili  0.3<H>  /  AST  14  /  ALT  18  /  AlkPhos  118  07-22    WBC Count: 3.79 (07-22-20 @ 06:46)  WBC Count: 3.70 (07-21-20 @ 06:00)  WBC Count: 3.66 (07-20-20 @ 05:36)  WBC Count: 4.06 (07-19-20 @ 05:52)  WBC Count: 4.13 (07-18-20 @ 06:12)  WBC Count: 4.75 (07-17-20 @ 06:59)  WBC Count: 5.04 (07-16-20 @ 07:12)    Creatinine, Serum: 1.77 (07-22)  Creatinine, Serum: 1.69 (07-21)  Creatinine, Serum: 2.04 (07-20)  Creatinine, Serum: 2.35 (07-19)  Creatinine, Serum: 1.65 (07-19)  Creatinine, Serum: 1.82 (07-19)  Creatinine, Serum: 1.52 (07-18)  Creatinine, Serum: 1.42 (07-17)  Creatinine, Serum: 1.40 (07-16)      MICROBIOLOGY:    Culture - Blood (collected 20 Jul 2020 23:18)  Source: .Blood Blood  Preliminary Report (22 Jul 2020 01:01):    No growth to date.    Culture - Blood (collected 20 Jul 2020 23:18)  Source: .Blood Blood  Preliminary Report (22 Jul 2020 01:01):    No growth to date.    Culture - Urine (collected 20 Jul 2020 15:23)  Source: .Urine Clean Catch (Midstream)  Final Report (21 Jul 2020 13:20):    No growth    Culture - Body Fluid with Gram Stain (collected 14 Jul 2020 00:32)  Source: .Body Fluid Abdominal Fluid  Gram Stain (14 Jul 2020 04:12):    polymorphonuclear leukocytes seen    No organisms seen    by cytocentrifuge  Preliminary Report (14 Jul 2020 22:08):    No growth    Culture - Blood (collected 12 Jul 2020 10:04)  Source: .Blood Blood-Peripheral  Preliminary Report (13 Jul 2020 11:01):    No growth to date.    Culture - Blood (collected 12 Jul 2020 10:04)  Source: .Blood Blood-Peripheral  Preliminary Report (13 Jul 2020 11:01):    No growth to date.    Culture - Blood (collected 10 Jul 2020 00:22)  Source: .Blood Blood-Peripheral  Enterococcus faecium (vancomycin resistant) (12 Jul 2020 11:59)  Organism: Enterococcus faecium (vancomycin resistant) (12 Jul 2020 11:59)      -  Ampicillin: R >8 Predicts results to ampicillin/sulbactam, amoxacillin-clavulanate and  piperacillin-tazobactam.      -  Daptomycin: S 4      -  Gentamicin synergy: S      -  Linezolid: S 1      -  Streptomycin synergy: R      -  Vancomycin: R >16      CMV IgG Antibody: <0.20 U/mL (12-04-19 @ 08:33)  Toxoplasma IgG Screen: <3.0 IU/mL (12-04-19 @ 08:33)      RADIOLOGY:  imaging below personally reviewed    OTHER TESTS:  COVID-19 PCR: NotDetec (07-16-20 @ 17:00)  Procalcitonin, Serum: 0.42 (07-22 @ 06:46)  Blood Gas Arterial, Lactate: 0.7 (07-20 @ 04:40) Follow Up:      Interval History/ROS:Patient is a 64y old  Male who presents with a chief complaint of Liver failure, hepatic encephalopathy (22 Jul 2020 14:17)  - Afebrile, same mental status and tremor  - BCx on 7/20 NGTD  - Cyclosporine Level=98 (07.22.20 @ 10:25)    Allergies  codeine (Anaphylaxis)    ANTIMICROBIALS:    linezolid  IVPB 600 every 12 hours  nystatin    Suspension 569021 four times a day  trimethoprim   80 mG/sulfamethoxazole 400 mG 1 daily  valGANciclovir 900 daily    OTHER MEDS: MEDICATIONS  (STANDING):  aspirin enteric coated 81 daily  cycloSPORINE  , modified (GENGRAF) 200 <User Schedule>  insulin glargine Injectable (LANTUS) 10 at bedtime  insulin lispro (HumaLOG) corrective regimen sliding scale  three times a day before meals  insulin lispro (HumaLOG) corrective regimen sliding scale  at bedtime  insulin lispro Injectable (HumaLOG) 4 three times a day before meals  melatonin 3 at bedtime  mycophenolate mofetil 750 <User Schedule>  pantoprazole    Tablet 40 before breakfast  predniSONE   Tablet 5 daily  QUEtiapine 25 <User Schedule>  tamsulosin 0.8 at bedtime      Vital Signs Last 24 Hrs  T(F): 97.2 (07-22-20 @ 13:11), Max: 98.9 (07-20-20 @ 21:00)    Vital Signs Last 24 Hrs  HR: 83 (07-22-20 @ 13:11) (79 - 98)  BP: 107/59 (07-22-20 @ 13:11) (100/54 - 132/63)  RR: 20 (07-22-20 @ 13:11)  SpO2: 97% (07-22-20 @ 13:11) (97% - 100%)  Wt(kg): --    EXAM:  General: Awake but not alert, +tremor  HEENT: PERRL, EOMI  Neck: Supple  Cardiac: RRR, No M/R/G  Resp: CTAB, No Wh/Rh/Ra  Abdomen: OLT staple line site is clean without surrounding erythema or drainage, No HSM, No rigidity or guarding  MSK: No LE edema. No Calf tenderness  : No bob  Skin: No rashes or lesions. Skin is warm and dry to the touch.   Neuro: Awake but not alert. CN 2-12 Grossly intact. Moves all four extremities spontaneously.  Psych: Encephalopathic - unable to assess    Labs:                        8.6    3.79  )-----------( 102      ( 22 Jul 2020 06:46 )             26.5     07-22    135  |  103  |  40<H>  ----------------------------<  180<H>  5.4<H>   |  24  |  1.77<H>    Ca    9.1      22 Jul 2020 06:46  Phos  3.7     07-22  Mg     1.8     07-22    TPro  5.5<L>  /  Alb  3.1<L>  /  TBili  0.7  /  DBili  0.3<H>  /  AST  14  /  ALT  18  /  AlkPhos  118  07-22    WBC Count: 3.79 (07-22-20 @ 06:46)  WBC Count: 3.70 (07-21-20 @ 06:00)  WBC Count: 3.66 (07-20-20 @ 05:36)  WBC Count: 4.06 (07-19-20 @ 05:52)  WBC Count: 4.13 (07-18-20 @ 06:12)  WBC Count: 4.75 (07-17-20 @ 06:59)  WBC Count: 5.04 (07-16-20 @ 07:12)    Creatinine, Serum: 1.77 (07-22)  Creatinine, Serum: 1.69 (07-21)  Creatinine, Serum: 2.04 (07-20)  Creatinine, Serum: 2.35 (07-19)  Creatinine, Serum: 1.65 (07-19)  Creatinine, Serum: 1.82 (07-19)  Creatinine, Serum: 1.52 (07-18)  Creatinine, Serum: 1.42 (07-17)  Creatinine, Serum: 1.40 (07-16)      MICROBIOLOGY:    Culture - Blood (collected 20 Jul 2020 23:18)  Source: .Blood Blood  Preliminary Report (22 Jul 2020 01:01):    No growth to date.    Culture - Blood (collected 20 Jul 2020 23:18)  Source: .Blood Blood  Preliminary Report (22 Jul 2020 01:01):    No growth to date.    Culture - Urine (collected 20 Jul 2020 15:23)  Source: .Urine Clean Catch (Midstream)  Final Report (21 Jul 2020 13:20):    No growth    Culture - Body Fluid with Gram Stain (collected 14 Jul 2020 00:32)  Source: .Body Fluid Abdominal Fluid  Gram Stain (14 Jul 2020 04:12):    polymorphonuclear leukocytes seen    No organisms seen    by cytocentrifuge  Preliminary Report (14 Jul 2020 22:08):    No growth    Culture - Blood (collected 12 Jul 2020 10:04)  Source: .Blood Blood-Peripheral  Preliminary Report (13 Jul 2020 11:01):    No growth to date.    Culture - Blood (collected 12 Jul 2020 10:04)  Source: .Blood Blood-Peripheral  Preliminary Report (13 Jul 2020 11:01):    No growth to date.    Culture - Blood (collected 10 Jul 2020 00:22)  Source: .Blood Blood-Peripheral  Enterococcus faecium (vancomycin resistant) (12 Jul 2020 11:59)  Organism: Enterococcus faecium (vancomycin resistant) (12 Jul 2020 11:59)      -  Ampicillin: R >8 Predicts results to ampicillin/sulbactam, amoxacillin-clavulanate and  piperacillin-tazobactam.      -  Daptomycin: S 4      -  Gentamicin synergy: S      -  Linezolid: S 1      -  Streptomycin synergy: R      -  Vancomycin: R >16      CMV IgG Antibody: <0.20 U/mL (12-04-19 @ 08:33)  Toxoplasma IgG Screen: <3.0 IU/mL (12-04-19 @ 08:33)      RADIOLOGY:    <The imaging below has been reviewed and visualized by me independently. Findings as detailed in report below>    EXAM:  XR CHEST PORTABLE ROUTINE 1V                        PROCEDURE DATE:  07/17/2020    The heart size is unremarkable.  No pneumothorax. Right lower lung linear atelectasis. Degenerative changes of the spine.    OTHER TESTS:  COVID-19 PCR: NotDetec (07-16-20 @ 17:00)  Procalcitonin, Serum: 0.42 (07-22 @ 06:46)  Blood Gas Arterial, Lactate: 0.7 (07-20 @ 04:40)

## 2020-07-22 NOTE — PROGRESS NOTE ADULT - ASSESSMENT
64 M hx of DM, HLD, GERD, HFpEF with mild LV diastolic dysfunction, and decompensated JEAN cirrhosis c/b ascites with hx of SBP, HE, duodenal ulcer, GAVE and duodenal AVM s/p APC (last on 10/11/19), UNOS listed for liver transplantation at [facility]. 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 S/p liver transplant 7/2 transferred from SICU to floor on 7/17/20 now with signs of tacrolimus toxicity and significant hospital delerium.     Impression:  #S/p liver transplant 7/2: liver enzymes continue to downtrend  OR details:  - L groin cutdown for vv bypass   - anastomosis: bicaval end-end/CBD duct-duct/HA & PV end-end, all standard anatomy.    - IOC with good flow  - Simulect induction. 500mg Solumedrol  - JPs x3 with T-Tube  - 14U pRBC, 14U FFP, 10 PLT, 11 CRYO, 500mL returned from cell saver, EBL 5L, UOP 1100mL  Recipient Info:   ABO: A  CMV:  Neg  EBV Positive  Donor:  Donor ID:  [id number] Match: [id number]  Age: 29 ABO:  A1 High Risk:   No COD: Cardiovascular  Anti CMV positive, EBV IgG- positive  HepBcAb-neg, Hepatitis C-DANA- neg, Hepatitis C ab-neg  # Halluciniations/agitation: worse at night. Getting significant chemical sedations. At times combative. Likely secondary to sleep wake cycle disturbances vs. ICU delerium vs. medication induced. On Seroquel, mirtazepine and melatonin at night. Olanzapine added PRN per Behavioral Health 7/18/20.  # Tremors: Involving bilateral UE - concern for tacrolimus toxicity requiring dose adjustments per Transplant Surgery.   # Sacral decubitus ulcer: does not appear infected  # MISA: serum Cr improved again to 1.7. No episodes of hypotension. UOP remains good. Suspect MISA due to overdiuresis vs. tacrolimus toxicity vs. side effect of meropenem vs transient hypotension  # Elevated liver enzymes: Resolved with mildly elevated ALK-P. Possible secondary to DILI vs. sepsis induced from VRE bacteremia.   # VRE on culture from 7/10: Repeat culture 7/12 negative. On linezolid, s/p meropenem. CT showing groin hematoma, s/p aspiration showing likely seroma and low c/f infection per ID.   #GI bleed: Resolved. Hgb stable with no overt bleeding post-transplant. 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 GAVE and PHG likely improved posttransplant.  # Abnormal Chest X-Ray c/f evolving PNA vs atelectasis. No signs of active infection  # Hypertension: Had episode of hypotension. Now BP normal range.   # Lower ext edema: Improved with Lasix. Diuretics discontinued for MISA.   #R posterior heel wound w/ overlying eschar –Local wound care. no signs of infections, XR neg for gas, evaluated by podiatry and ID. MRI w/o contrast limited, but no overt signs of active infection.  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     Recommendation:  - minimize chemical sedation, frequent reorientation and visitation with normalized sleep/wake cycle environment  - continue prednisone 5 mg PO daily  - continue with cyclosporine 150mg BID given c/f tacro toxicity; adjust by levels  - continue Cellcept (currently on 750 mg BID) - dosing as per transplant surgery  - stop ursodiol  - linezolid for VE bacteremia as per Transplant ID; last dose 7/24  - continue nystatin, Bactrim and Valcyte ppx  - physical therapy daily to get OOB and ambulating  - seroquel qhs  - continue with mag oxide 200 mg BID  - monitor Hb on daily CBC - continue PO PPI BID, monitor bowel movements  - follow up Wound care recommendations for R heel ulcer and sacral decub  - follow up other Transplant surgery recommendations

## 2020-07-23 LAB
ALBUMIN SERPL ELPH-MCNC: 3.1 G/DL — LOW (ref 3.3–5)
ALP SERPL-CCNC: 107 U/L — SIGNIFICANT CHANGE UP (ref 40–120)
ALT FLD-CCNC: 16 U/L — SIGNIFICANT CHANGE UP (ref 10–45)
ANION GAP SERPL CALC-SCNC: 6 MMOL/L — SIGNIFICANT CHANGE UP (ref 5–17)
APTT BLD: 28.7 SEC — SIGNIFICANT CHANGE UP (ref 27.5–35.5)
AST SERPL-CCNC: 15 U/L — SIGNIFICANT CHANGE UP (ref 10–40)
BILIRUB DIRECT SERPL-MCNC: 0.3 MG/DL — HIGH (ref 0–0.2)
BILIRUB INDIRECT FLD-MCNC: 0.5 MG/DL — SIGNIFICANT CHANGE UP (ref 0.2–1)
BILIRUB SERPL-MCNC: 0.8 MG/DL — SIGNIFICANT CHANGE UP (ref 0.2–1.2)
BUN SERPL-MCNC: 42 MG/DL — HIGH (ref 7–23)
CALCIUM SERPL-MCNC: 9.2 MG/DL — SIGNIFICANT CHANGE UP (ref 8.4–10.5)
CHLORIDE SERPL-SCNC: 105 MMOL/L — SIGNIFICANT CHANGE UP (ref 96–108)
CO2 SERPL-SCNC: 25 MMOL/L — SIGNIFICANT CHANGE UP (ref 22–31)
CREAT SERPL-MCNC: 1.8 MG/DL — HIGH (ref 0.5–1.3)
CYCLOSPORINE SER-MCNC: 152 NG/ML — SIGNIFICANT CHANGE UP (ref 150–400)
GLUCOSE BLDC GLUCOMTR-MCNC: 130 MG/DL — HIGH (ref 70–99)
GLUCOSE BLDC GLUCOMTR-MCNC: 139 MG/DL — HIGH (ref 70–99)
GLUCOSE BLDC GLUCOMTR-MCNC: 142 MG/DL — HIGH (ref 70–99)
GLUCOSE BLDC GLUCOMTR-MCNC: 87 MG/DL — SIGNIFICANT CHANGE UP (ref 70–99)
GLUCOSE SERPL-MCNC: 78 MG/DL — SIGNIFICANT CHANGE UP (ref 70–99)
HCT VFR BLD CALC: 23.9 % — LOW (ref 39–50)
HGB BLD-MCNC: 8 G/DL — LOW (ref 13–17)
INR BLD: 1.06 RATIO — SIGNIFICANT CHANGE UP (ref 0.88–1.16)
MAGNESIUM SERPL-MCNC: 1.8 MG/DL — SIGNIFICANT CHANGE UP (ref 1.6–2.6)
MCHC RBC-ENTMCNC: 32.8 PG — SIGNIFICANT CHANGE UP (ref 27–34)
MCHC RBC-ENTMCNC: 33.5 GM/DL — SIGNIFICANT CHANGE UP (ref 32–36)
MCV RBC AUTO: 98 FL — SIGNIFICANT CHANGE UP (ref 80–100)
NRBC # BLD: 0 /100 WBCS — SIGNIFICANT CHANGE UP (ref 0–0)
PHOSPHATE SERPL-MCNC: 3.7 MG/DL — SIGNIFICANT CHANGE UP (ref 2.5–4.5)
PLATELET # BLD AUTO: 78 K/UL — LOW (ref 150–400)
POTASSIUM SERPL-MCNC: 5.6 MMOL/L — HIGH (ref 3.5–5.3)
POTASSIUM SERPL-SCNC: 5.6 MMOL/L — HIGH (ref 3.5–5.3)
PROCALCITONIN SERPL-MCNC: 0.39 NG/ML — HIGH (ref 0.02–0.1)
PROT SERPL-MCNC: 5.3 G/DL — LOW (ref 6–8.3)
PROTHROM AB SERPL-ACNC: 12.6 SEC — SIGNIFICANT CHANGE UP (ref 10.6–13.6)
RBC # BLD: 2.44 M/UL — LOW (ref 4.2–5.8)
RBC # FLD: 18.5 % — HIGH (ref 10.3–14.5)
SODIUM SERPL-SCNC: 136 MMOL/L — SIGNIFICANT CHANGE UP (ref 135–145)
WBC # BLD: 3.79 K/UL — LOW (ref 3.8–10.5)
WBC # FLD AUTO: 3.79 K/UL — LOW (ref 3.8–10.5)

## 2020-07-23 PROCEDURE — 99232 SBSQ HOSP IP/OBS MODERATE 35: CPT | Mod: GC

## 2020-07-23 PROCEDURE — 71045 X-RAY EXAM CHEST 1 VIEW: CPT | Mod: 26

## 2020-07-23 PROCEDURE — 99232 SBSQ HOSP IP/OBS MODERATE 35: CPT

## 2020-07-23 PROCEDURE — 99232 SBSQ HOSP IP/OBS MODERATE 35: CPT | Mod: GC,24

## 2020-07-23 RX ORDER — DAPTOMYCIN 500 MG/10ML
800 INJECTION, POWDER, LYOPHILIZED, FOR SOLUTION INTRAVENOUS EVERY 24 HOURS
Refills: 0 | Status: COMPLETED | OUTPATIENT
Start: 2020-07-23 | End: 2020-07-26

## 2020-07-23 RX ORDER — MAGNESIUM SULFATE 500 MG/ML
1 VIAL (ML) INJECTION ONCE
Refills: 0 | Status: COMPLETED | OUTPATIENT
Start: 2020-07-23 | End: 2020-07-23

## 2020-07-23 RX ORDER — HALOPERIDOL DECANOATE 100 MG/ML
0.5 INJECTION INTRAMUSCULAR EVERY 12 HOURS
Refills: 0 | Status: DISCONTINUED | OUTPATIENT
Start: 2020-07-23 | End: 2020-07-31

## 2020-07-23 RX ORDER — SODIUM CHLORIDE 9 MG/ML
500 INJECTION INTRAMUSCULAR; INTRAVENOUS; SUBCUTANEOUS ONCE
Refills: 0 | Status: COMPLETED | OUTPATIENT
Start: 2020-07-23 | End: 2020-07-23

## 2020-07-23 RX ORDER — DAPTOMYCIN 500 MG/10ML
800 INJECTION, POWDER, LYOPHILIZED, FOR SOLUTION INTRAVENOUS EVERY 24 HOURS
Refills: 0 | Status: DISCONTINUED | OUTPATIENT
Start: 2020-07-23 | End: 2020-07-23

## 2020-07-23 RX ORDER — HALOPERIDOL DECANOATE 100 MG/ML
0.5 INJECTION INTRAMUSCULAR ONCE
Refills: 0 | Status: COMPLETED | OUTPATIENT
Start: 2020-07-23 | End: 2020-07-23

## 2020-07-23 RX ORDER — SODIUM ZIRCONIUM CYCLOSILICATE 10 G/10G
10 POWDER, FOR SUSPENSION ORAL ONCE
Refills: 0 | Status: COMPLETED | OUTPATIENT
Start: 2020-07-23 | End: 2020-07-23

## 2020-07-23 RX ORDER — HALOPERIDOL DECANOATE 100 MG/ML
1 INJECTION INTRAMUSCULAR AT BEDTIME
Refills: 0 | Status: DISCONTINUED | OUTPATIENT
Start: 2020-07-23 | End: 2020-07-24

## 2020-07-23 RX ORDER — INSULIN GLARGINE 100 [IU]/ML
8 INJECTION, SOLUTION SUBCUTANEOUS AT BEDTIME
Refills: 0 | Status: DISCONTINUED | OUTPATIENT
Start: 2020-07-23 | End: 2020-07-24

## 2020-07-23 RX ADMIN — CYCLOSPORINE 200 MILLIGRAM(S): 100 CAPSULE ORAL at 21:06

## 2020-07-23 RX ADMIN — MYCOPHENOLATE MOFETIL 750 MILLIGRAM(S): 250 CAPSULE ORAL at 21:06

## 2020-07-23 RX ADMIN — MYCOPHENOLATE MOFETIL 750 MILLIGRAM(S): 250 CAPSULE ORAL at 09:00

## 2020-07-23 RX ADMIN — LINEZOLID 300 MILLIGRAM(S): 600 INJECTION, SOLUTION INTRAVENOUS at 00:02

## 2020-07-23 RX ADMIN — TAMSULOSIN HYDROCHLORIDE 0.8 MILLIGRAM(S): 0.4 CAPSULE ORAL at 21:05

## 2020-07-23 RX ADMIN — MAGNESIUM OXIDE 400 MG ORAL TABLET 800 MILLIGRAM(S): 241.3 TABLET ORAL at 17:13

## 2020-07-23 RX ADMIN — Medication 500000 UNIT(S): at 11:54

## 2020-07-23 RX ADMIN — HALOPERIDOL DECANOATE 0.5 MILLIGRAM(S): 100 INJECTION INTRAMUSCULAR at 08:53

## 2020-07-23 RX ADMIN — Medication 500000 UNIT(S): at 17:13

## 2020-07-23 RX ADMIN — HALOPERIDOL DECANOATE 1 MILLIGRAM(S): 100 INJECTION INTRAMUSCULAR at 20:03

## 2020-07-23 RX ADMIN — CHLORHEXIDINE GLUCONATE 1 APPLICATION(S): 213 SOLUTION TOPICAL at 12:39

## 2020-07-23 RX ADMIN — NYSTATIN CREAM 1 APPLICATION(S): 100000 CREAM TOPICAL at 17:13

## 2020-07-23 RX ADMIN — NYSTATIN CREAM 1 APPLICATION(S): 100000 CREAM TOPICAL at 06:13

## 2020-07-23 RX ADMIN — Medication 100 GRAM(S): at 10:38

## 2020-07-23 RX ADMIN — CYCLOSPORINE 200 MILLIGRAM(S): 100 CAPSULE ORAL at 08:55

## 2020-07-23 RX ADMIN — Medication 81 MILLIGRAM(S): at 11:53

## 2020-07-23 RX ADMIN — SODIUM CHLORIDE 500 MILLILITER(S): 9 INJECTION INTRAMUSCULAR; INTRAVENOUS; SUBCUTANEOUS at 11:50

## 2020-07-23 RX ADMIN — Medication 1 TABLET(S): at 11:53

## 2020-07-23 RX ADMIN — Medication 500000 UNIT(S): at 06:13

## 2020-07-23 RX ADMIN — Medication 500000 UNIT(S): at 23:18

## 2020-07-23 RX ADMIN — INSULIN GLARGINE 8 UNIT(S): 100 INJECTION, SOLUTION SUBCUTANEOUS at 21:40

## 2020-07-23 RX ADMIN — SODIUM ZIRCONIUM CYCLOSILICATE 10 GRAM(S): 10 POWDER, FOR SUSPENSION ORAL at 08:57

## 2020-07-23 RX ADMIN — PANTOPRAZOLE SODIUM 40 MILLIGRAM(S): 20 TABLET, DELAYED RELEASE ORAL at 06:13

## 2020-07-23 RX ADMIN — VALGANCICLOVIR 900 MILLIGRAM(S): 450 TABLET, FILM COATED ORAL at 11:53

## 2020-07-23 RX ADMIN — DAPTOMYCIN 132 MILLIGRAM(S): 500 INJECTION, POWDER, LYOPHILIZED, FOR SOLUTION INTRAVENOUS at 14:51

## 2020-07-23 RX ADMIN — Medication 1 APPLICATION(S): at 11:55

## 2020-07-23 RX ADMIN — HALOPERIDOL DECANOATE 0.5 MILLIGRAM(S): 100 INJECTION INTRAMUSCULAR at 21:37

## 2020-07-23 RX ADMIN — Medication 3 MILLIGRAM(S): at 21:06

## 2020-07-23 NOTE — PROGRESS NOTE ADULT - SUBJECTIVE AND OBJECTIVE BOX
Diabetes Follow up note:    Chief complaint: T2DM s/p liver xplant    Interval Hx: Fasting glucose 70-80s this AM. Premeal insulin held. Pt confused and agitated when seen at bedside this AM. RN and 1:1 at bedside assisting pt.     Review of Systems:  unable to provide ROS.     MEDS:  insulin glargine Injectable (LANTUS) 8 Unit(s) SubCutaneous at bedtime  insulin lispro (HumaLOG) corrective regimen sliding scale   SubCutaneous three times a day before meals  insulin lispro (HumaLOG) corrective regimen sliding scale   SubCutaneous at bedtime  insulin lispro Injectable (HumaLOG) 5 Unit(s) SubCutaneous three times a day before meals  predniSONE   Tablet 5 milliGRAM(s) Oral daily    linezolid  IVPB 600 milliGRAM(s) IV Intermittent every 12 hours  nystatin    Suspension 134047 Unit(s) Oral four times a day  trimethoprim   80 mG/sulfamethoxazole 400 mG 1 Tablet(s) Oral daily  valGANciclovir 900 milliGRAM(s) Oral daily    Allergies    codeine (Anaphylaxis)      PE:  General: Male lying in bed. NAD  Vital Signs Last 24 Hrs  T(C): 36.3 (23 Jul 2020 09:00), Max: 36.7 (22 Jul 2020 17:00)  T(F): 97.4 (23 Jul 2020 09:00), Max: 98 (22 Jul 2020 17:00)  HR: 85 (23 Jul 2020 09:00) (82 - 91)  BP: 140/65 (23 Jul 2020 09:00) (107/59 - 140/65)  BP(mean): 94 (22 Jul 2020 17:00) (94 - 94)  RR: 18 (23 Jul 2020 09:00) (18 - 20)  SpO2: 95% (23 Jul 2020 09:00) (91% - 97%)  Abd: Soft, NT,ND, central adiposity  Extremities: Warm. L groin staple site c/d/i  Neuro: Alert. confused     LABS:  POCT Blood Glucose.: 87 mg/dL (07-23-20 @ 09:04)  POCT Blood Glucose.: 129 mg/dL (07-22-20 @ 21:42)  POCT Blood Glucose.: 217 mg/dL (07-22-20 @ 17:15)  POCT Blood Glucose.: 245 mg/dL (07-22-20 @ 11:38)  POCT Blood Glucose.: 147 mg/dL (07-22-20 @ 08:04)  POCT Blood Glucose.: 187 mg/dL (07-21-20 @ 23:21)  POCT Blood Glucose.: 170 mg/dL (07-21-20 @ 21:28)  POCT Blood Glucose.: 161 mg/dL (07-21-20 @ 17:00)  POCT Blood Glucose.: 149 mg/dL (07-21-20 @ 12:22)  POCT Blood Glucose.: 168 mg/dL (07-21-20 @ 08:21)  POCT Blood Glucose.: 170 mg/dL (07-20-20 @ 21:27)  POCT Blood Glucose.: 110 mg/dL (07-20-20 @ 17:46)  POCT Blood Glucose.: 148 mg/dL (07-20-20 @ 13:04)                            8.0    3.79  )-----------( 78       ( 23 Jul 2020 05:48 )             23.9       07-23    136  |  105  |  42<H>  ----------------------------<  78  5.6<H>   |  25  |  1.80<H>    Ca    9.2      23 Jul 2020 05:48  Phos  3.7     07-23  Mg     1.8     07-23    TPro  5.3<L>  /  Alb  3.1<L>  /  TBili  0.8  /  DBili  0.3<H>  /  AST  15  /  ALT  16  /  AlkPhos  107  07-23      A1C with Estimated Average Glucose Result: 5.2 % (06-14-20 @ 11:00)  A1C with Estimated Average Glucose Result: 5.4 % (06-12-20 @ 08:26)  A1C with Estimated Average Glucose Result: 4.8 % (04-26-20 @ 09:39)  Hemoglobin A1C, Whole Blood: 6.4 % (02-12-20 @ 17:08)  Hemoglobin A1C, Whole Blood: 5.8 % (12-04-19 @ 08:38)          Contact number: kit [phone] or [phone]

## 2020-07-23 NOTE — PROGRESS NOTE ADULT - SUBJECTIVE AND OBJECTIVE BOX
INTERVAL HPI/OVERNIGHT EVENTS:    events noted  remains agitated        MEDICATIONS  (STANDING):  aspirin enteric coated 81 milliGRAM(s) Oral daily  chlorhexidine 2% Cloths 1 Application(s) Topical <User Schedule>  collagenase Ointment 1 Application(s) Topical daily  furosemide    Tablet 80 milliGRAM(s) Oral every 8 hours  insulin glargine Injectable (LANTUS) 16 Unit(s) SubCutaneous at bedtime  insulin lispro (HumaLOG) corrective regimen sliding scale   SubCutaneous three times a day before meals  insulin lispro (HumaLOG) corrective regimen sliding scale   SubCutaneous at bedtime  insulin lispro Injectable (HumaLOG) 10 Unit(s) SubCutaneous before dinner  insulin lispro Injectable (HumaLOG) 8 Unit(s) SubCutaneous before breakfast  insulin lispro Injectable (HumaLOG) 8 Unit(s) SubCutaneous before lunch  linezolid  IVPB 600 milliGRAM(s) IV Intermittent every 12 hours  magnesium oxide 800 milliGRAM(s) Oral two times a day with meals  meropenem  IVPB 1000 milliGRAM(s) IV Intermittent every 8 hours  nystatin    Suspension 134254 Unit(s) Swish and Swallow four times a day  nystatin Cream 1 Application(s) Topical two times a day  pantoprazole  Injectable 40 milliGRAM(s) IV Push every 12 hours  polyethylene glycol 3350 17 Gram(s) Oral daily  predniSONE   Tablet 5 milliGRAM(s) Oral daily  senna 2 Tablet(s) Oral at bedtime  tacrolimus 3 milliGRAM(s) Oral <User Schedule>  tacrolimus 2 milliGRAM(s) Oral <User Schedule>  tamsulosin 0.8 milliGRAM(s) Oral at bedtime  trimethoprim   80 mG/sulfamethoxazole 400 mG 1 Tablet(s) Oral daily  valGANciclovir 450 milliGRAM(s) Oral daily    MEDICATIONS  (PRN):  glucagon  Injectable 1 milliGRAM(s) IntraMuscular once PRN Glucose <70 milliGRAM(s)/deciLiter      Allergies    codeine (Anaphylaxis)    Intolerances        Review of Systems:    General:  No wt loss, fevers, chills, night sweats,fatigue,   Eyes:  Good vision, no reported pain  ENT:  No sore throat, pain, runny nose, dysphagia  CV:  No pain, palpitatioins, hypo/hypertension  Resp:  No dyspnea, cough, tachypnea, wheezing  GI:  No pain, No nausea, No vomiting, No diarrhea, No constipatiion, No weight loss, No fever, No pruritis, No rectal bleeding, No tarry stools, No dysphagia,  :  No pain, bleeding, incontinence, nocturia  Muscle:  No pain, weakness  Neuro:  No weakness, tingling, memory problems  Psych:  No fatigue, insomnia, mood problems, depression  Endocrine:  No polyuria, polydypsia, cold/heat intolerance  Heme:  No petechiae, ecchymosis, easy bruisability  Skin:  No rash, tattoos, scars, edema      Vital Signs Last 24 Hrs  T(C): 36.3 (12 Jul 2020 15:00), Max: 36.8 (11 Jul 2020 19:00)  T(F): 97.3 (12 Jul 2020 15:00), Max: 98.2 (11 Jul 2020 19:00)  HR: 80 (12 Jul 2020 15:00) (72 - 83)  BP: 109/59 (12 Jul 2020 15:00) (94/55 - 154/68)  BP(mean): 78 (12 Jul 2020 15:00) (70 - 125)  RR: 15 (12 Jul 2020 15:00) (9 - 28)  SpO2: 96% (12 Jul 2020 15:00) (96% - 100%)    PHYSICAL EXAM:    Constitutional: NAD  HEENT: sclera anicteric   Neck: No LAD, supple  Gastrointestinal: BS+, soft, ND, +incisional tenderness, incisions c/d/i  Extremities: +b/l peripheral edema  Neurological: awake, alert, +tremors - improving     LABS:                        9.3    5.51  )-----------( 98       ( 12 Jul 2020 01:54 )             28.6     07-12    131<L>  |  95<L>  |  31<H>  ----------------------------<  217<H>  5.0   |  27  |  1.26    Ca    8.1<L>      12 Jul 2020 12:22  Phos  4.0     07-12  Mg     2.0     07-12    TPro  4.4<L>  /  Alb  2.6<L>  /  TBili  1.1  /  DBili  0.5<H>  /  AST  64<H>  /  ALT  98<H>  /  AlkPhos  212<H>  07-12    PT/INR - ( 12 Jul 2020 01:54 )   PT: 12.7 sec;   INR: 1.12 ratio         PTT - ( 12 Jul 2020 01:54 )  PTT:28.7 sec      RADIOLOGY & ADDITIONAL TESTS:

## 2020-07-23 NOTE — PROGRESS NOTE ADULT - SUBJECTIVE AND OBJECTIVE BOX
Follow Up: post-OLT on 7/2, Blood Cultures (7/10) with VRE    Interval History/ROS:Patient is a 64y old  Male who presents with a chief complaint of Liver failure, hepatic encephalopathy (23 Jul 2020 11:28)  - Afebrile, seems to be off today with tremor as well  - Will switch Linezolid to Daptomycin to avoid cytopenia and neuropathy    Allergies  codeine (Anaphylaxis)    ANTIMICROBIALS:    DAPTOmycin IVPB 800 every 24 hours  nystatin    Suspension 667440 four times a day  trimethoprim   80 mG/sulfamethoxazole 400 mG 1 daily  valGANciclovir 900 daily    OTHER MEDS: MEDICATIONS  (STANDING):  aspirin enteric coated 81 daily  cycloSPORINE  , modified (GENGRAF) 200 <User Schedule>  haloperidol    Injectable 1 at bedtime  haloperidol    Injectable 0.5 every 12 hours PRN  insulin glargine Injectable (LANTUS) 8 at bedtime  insulin lispro (HumaLOG) corrective regimen sliding scale  three times a day before meals  insulin lispro (HumaLOG) corrective regimen sliding scale  at bedtime  insulin lispro Injectable (HumaLOG) 5 three times a day before meals  melatonin 3 at bedtime  mycophenolate mofetil 750 <User Schedule>  pantoprazole    Tablet 40 before breakfast  predniSONE   Tablet 5 daily  tamsulosin 0.8 at bedtime      Vital Signs Last 24 Hrs  T(F): 98.4 (07-23-20 @ 13:00), Max: 98.9 (07-20-20 @ 21:00)    Vital Signs Last 24 Hrs  HR: 79 (07-23-20 @ 13:00) (79 - 91)  BP: 133/66 (07-23-20 @ 13:00) (115/58 - 140/65)  RR: 18 (07-23-20 @ 13:00)  SpO2: 96% (07-23-20 @ 13:00) (91% - 97%)  Wt(kg): --    EXAM:  General: Awake but not alert, +tremor  HEENT: PERRL, EOMI  Neck: Supple  Cardiac: RRR, No M/R/G  Resp: CTAB, No Wh/Rh/Ra  Abdomen: OLT staple line site is clean without surrounding erythema or drainage, No HSM, No rigidity or guarding  MSK: No LE edema. No Calf tenderness  : No bob  Skin: No rashes or lesions. Skin is warm and dry to the touch.   Neuro: Awake but not alert. CN 2-12 Grossly intact. Moves all four extremities spontaneously.  Psych: Encephalopathic - unable to assess    Labs:                        8.0    3.79  )-----------( 78       ( 23 Jul 2020 05:48 )             23.9     07-23    136  |  105  |  42<H>  ----------------------------<  78  5.6<H>   |  25  |  1.80<H>    Ca    9.2      23 Jul 2020 05:48  Phos  3.7     07-23  Mg     1.8     07-23    TPro  5.3<L>  /  Alb  3.1<L>  /  TBili  0.8  /  DBili  0.3<H>  /  AST  15  /  ALT  16  /  AlkPhos  107  07-23    WBC Count: 3.79 (07-22-20 @ 06:46)  WBC Count: 3.70 (07-21-20 @ 06:00)  WBC Count: 3.66 (07-20-20 @ 05:36)  WBC Count: 4.06 (07-19-20 @ 05:52)  WBC Count: 4.13 (07-18-20 @ 06:12)  WBC Count: 4.75 (07-17-20 @ 06:59)      Creatinine, Serum: 1.80 (07-23)  Creatinine, Serum: 1.77 (07-22)  Creatinine, Serum: 1.69 (07-21)  Creatinine, Serum: 2.04 (07-20)  Creatinine, Serum: 2.35 (07-19)  Creatinine, Serum: 1.65 (07-19)  Creatinine, Serum: 1.82 (07-19)  Creatinine, Serum: 1.52 (07-18)  Creatinine, Serum: 1.42 (07-17)    MICROBIOLOGY:    Culture - Blood (collected 20 Jul 2020 23:18)  Source: .Blood Blood  Preliminary Report (22 Jul 2020 01:01):    No growth to date.    Culture - Blood (collected 20 Jul 2020 23:18)  Source: .Blood Blood  Preliminary Report (22 Jul 2020 01:01):    No growth to date.    Culture - Urine (collected 20 Jul 2020 15:23)  Source: .Urine Clean Catch (Midstream)  Final Report (21 Jul 2020 13:20):    No growth Follow Up: post-OLT on 7/2, Blood Cultures (7/10) with VRE    Interval History/ROS:Patient is a 64y old  Male who presents with a chief complaint of Liver failure, hepatic encephalopathy (23 Jul 2020 11:28)  - Afebrile, seems to be off today with tremor as well  - Will switch Linezolid to Daptomycin to avoid cytopenia and neuropathy    Allergies  codeine (Anaphylaxis)    ANTIMICROBIALS:    DAPTOmycin IVPB 800 every 24 hours  nystatin    Suspension [id number] four times a day  trimethoprim   80 mG/sulfamethoxazole 400 mG 1 daily  valGANciclovir 900 daily    OTHER MEDS: MEDICATIONS  (STANDING):  aspirin enteric coated 81 daily  cycloSPORINE  , modified (GENGRAF) 200 <User Schedule>  haloperidol    Injectable 1 at bedtime  haloperidol    Injectable 0.5 every 12 hours PRN  insulin glargine Injectable (LANTUS) 8 at bedtime  insulin lispro (HumaLOG) corrective regimen sliding scale  three times a day before meals  insulin lispro (HumaLOG) corrective regimen sliding scale  at bedtime  insulin lispro Injectable (HumaLOG) 5 three times a day before meals  melatonin 3 at bedtime  mycophenolate mofetil 750 <User Schedule>  pantoprazole    Tablet 40 before breakfast  predniSONE   Tablet 5 daily  tamsulosin 0.8 at bedtime      Vital Signs Last 24 Hrs  T(F): 98.4 (07-23-20 @ 13:00), Max: 98.9 (07-20-20 @ 21:00)    Vital Signs Last 24 Hrs  HR: 79 (07-23-20 @ 13:00) (79 - 91)  BP: 133/66 (07-23-20 @ 13:00) (115/58 - 140/65)  RR: 18 (07-23-20 @ 13:00)  SpO2: 96% (07-23-20 @ 13:00) (91% - 97%)  Wt(kg): --    EXAM:  General: Awake but not alert, +tremor  HEENT: PERRL, EOMI  Neck: Supple  Cardiac: RRR, No M/R/G  Resp: CTAB, No Wh/Rh/Ra  Abdomen: OLT staple line site is clean without surrounding erythema or drainage, No HSM, No rigidity or guarding  MSK: No LE edema. No Calf tenderness  : No bob  Skin: No rashes or lesions. Skin is warm and dry to the touch.   Neuro: Awake but not alert. CN 2-12 Grossly intact. Moves all four extremities spontaneously.  Psych: Encephalopathic - unable to assess    Labs:                        8.0    3.79  )-----------( 78       ( 23 Jul 2020 05:48 )             23.9     07-23    136  |  105  |  42<H>  ----------------------------<  78  5.6<H>   |  25  |  1.80<H>    Ca    9.2      23 Jul 2020 05:48  Phos  3.7     07-23  Mg     1.8     07-23    TPro  5.3<L>  /  Alb  3.1<L>  /  TBili  0.8  /  DBili  0.3<H>  /  AST  15  /  ALT  16  /  AlkPhos  107  07-23    WBC Count: 3.79 (07-22-20 @ 06:46)  WBC Count: 3.70 (07-21-20 @ 06:00)  WBC Count: 3.66 (07-20-20 @ 05:36)  WBC Count: 4.06 (07-19-20 @ 05:52)  WBC Count: 4.13 (07-18-20 @ 06:12)  WBC Count: 4.75 (07-17-20 @ 06:59)      Creatinine, Serum: 1.80 (07-23)  Creatinine, Serum: 1.77 (07-22)  Creatinine, Serum: 1.69 (07-21)  Creatinine, Serum: 2.04 (07-20)  Creatinine, Serum: 2.35 (07-19)  Creatinine, Serum: 1.65 (07-19)  Creatinine, Serum: 1.82 (07-19)  Creatinine, Serum: 1.52 (07-18)  Creatinine, Serum: 1.42 (07-17)    MICROBIOLOGY:    Culture - Blood (collected 20 Jul 2020 23:18)  Source: .Blood Blood  Preliminary Report (22 Jul 2020 01:01):    No growth to date.    Culture - Blood (collected 20 Jul 2020 23:18)  Source: .Blood Blood  Preliminary Report (22 Jul 2020 01:01):    No growth to date.    Culture - Urine (collected 20 Jul 2020 15:23)  Source: .Urine Clean Catch (Midstream)  Final Report (21 Jul 2020 13:20):    No growth    RADIOLOGY:    <The imaging below has been reviewed and visualized by me independently. Findings as detailed in report below>    EXAM:  XR CHEST PORTABLE URGENT 1V                        PROCEDURE DATE:  07/23/2020    Study is limited by low lung volumes and by patient positioning.  Difficult to assess heart size.  Thoracic aortic atheromatous changes and ectasia are stable.  No bilateral focal consolidations.  No large pleural effusion. No pneumothorax.  No acute bony finding.

## 2020-07-23 NOTE — PROGRESS NOTE ADULT - ASSESSMENT
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    [] POD 21 s/p OLT   - Good graft function, LFTs stable   - Immuno: off tacro as of 7/19 , Cyclo 200mg bid (will adjust by level), MMF 750mg bid; Pred 5mg daily  - PPX: valcyte/nystatin/bactrim  - Continue ASA 81mg daily  - Electrolytes: low K diet, keep Mag >2, continue PO supplementation, Mag 1g IV today   - h/o UGI bleed: Protonix 40mg daily   - Pain control PRN  - will give haldol PRN for agitation   -start IVF  - DVT PPx: SCDs at all times  - Sacral decub: wound care consult  - plan to remove staples today     [] VRE bacteremia (7/10)  - ID following: on linezolid, end date 7/23  - Bld cx from 7/20 with NGTD, urine cx (7/20) with NG     [] Hyperglycemia  - Appreciate endocrinology recommendations  - Cont Lantus and Humalog insulin wih SSI coverage  - Fingersticks ac and qhs    [] Heel ulcer  - Continue local wound care w/ santyl and DSD as per podiatry    [] Dispo:   - continue 1:1 with tele  - F/U PT recs for possible Discharge to acute vs [facility]  - SW with tx team working with pt & family on DC plan 64M with IDDM, HLD, obesity, HFpEF with mild LV diastolic dysfunction, MGUS, chronic anemia with a history of duodenal ulcer as well as GAVE and duodenal AVM s/p APC (last on 10/11/19), 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    [] POD 21 s/p OLT   - Good graft function, LFTs stable   - Immuno: off tacro as of 7/19. Cyclo 200mg bid (will adjust by level), MMF 750mg bid; Pred 5mg daily  - PPX: valcyte/nystatin/bactrim  - Continue ASA 81mg daily  - Electrolytes: Hyperkalemia (K 5.6) Lokelma x 1 dose, keep Mag >2, continue PO supplementation, Mag 1g IV today   - h/o UGI bleed: Protonix 40mg daily   - Pain control PRN  - will give haldol PRN for agitation   - MISA: NS 500cc bolus  - DVT PPx: SCDs at all times  - Sacral decub: seen by  wound care this am  - plan to remove staples today     [] VRE bacteremia (7/10)  - ID following: linezolid until 7/24  - Bld cx from 7/20 with NG, urine cx (7/20) with NG     [] Hyperglycemia  - Appreciate endocrinology recommendations  - Decr Lantus 8u and Humalog insulin wih SSI coverage  - Fingersticks ac and qhs    [] Heel ulcer  - Continue local wound care w/ santyl and DSD as per podiatry    [] Dispo:   - continue 1:1 with tele  - F/U PT recs for possible Discharge to acute vs [facility]  - SW with tx team working with pt & family on DC plan

## 2020-07-23 NOTE — PROGRESS NOTE ADULT - ATTENDING COMMENTS
Hepatic allograft function stable, but delirium not yet improving, likely multifactorial with sleep-wake cycle reversal contributing.

## 2020-07-23 NOTE — PROGRESS NOTE ADULT - PROBLEM SELECTOR PLAN 1
-test BG AC/HS  -Decrease Lantus 8 units QHS  -c/w Humalog 5 units AC meals (Please hold if not eating)  -c/w Humalog low correction scale AC and Low HS scale  DISPO: basal/bolus, with doses TBD  -Pt can resume f/u with Dr. Mathur or   [facility] [address] [place] [address] [phone].

## 2020-07-23 NOTE — PROGRESS NOTE ADULT - SUBJECTIVE AND OBJECTIVE BOX
Wound Surgery Progress Note:    HPI:  65 yo M PMHx of decompensated JEAN cirrhosis, HTn, HLD, DM, HFpEF (EF 70%) presents with reports of confusion. Saw his hepatologist yesterday was noted to be more jaundiced and more confused than normal. Per wife his was not oriented to time and place, and forgot how many grandchildren he had. He also urinated in his bedroom. He was confused for roughly 12 hours. He is now back to his baseline mental status. He denies fever, chills, dysuria, increased urinary frequency, flank pain, neck stiffness. No melena, hematochezia, hematemesis. (11 Jun 2020 13:53)    Follow up visit for wound care consult for bilateral buttocks wound. Patient was encountered on an alternating air with low air loss surface. He was seen with Dr. Almeida and his clinical nurse who reported that the patient continues to be delirious and have frequent loose stools. He was grossly incontinent of loose mushy stool on exam today. The condition of his bilateral buttock/sacral skin appears slightly improved since my last visit 7.17.2020. Will continue current management.    PAST MEDICAL & SURGICAL HISTORY:  GIB (gastrointestinal bleeding)  GERD with esophagitis: Gastritis &amp; Non Bleeding Ulcers  Hepatic encephalopathy  Obesity  Fatty liver disease, nonalcoholic  Renal stones: 25 years ago  Hypertension  Neuropathy  Hypercholesteremia  Diabetes  S/P cholecystectomy    REVIEW OF SYSTEMS  Unable to obtain due to patient's condition    MEDICATIONS  (STANDING):  aspirin enteric coated 81 milliGRAM(s) Oral daily  chlorhexidine 2% Cloths 1 Application(s) Topical <User Schedule>  collagenase Ointment 1 Application(s) Topical daily  cycloSPORINE  , modified (GENGRAF) 200 milliGRAM(s) Oral <User Schedule>  insulin glargine Injectable (LANTUS) 10 Unit(s) SubCutaneous at bedtime  insulin lispro (HumaLOG) corrective regimen sliding scale   SubCutaneous three times a day before meals  insulin lispro (HumaLOG) corrective regimen sliding scale   SubCutaneous at bedtime  insulin lispro Injectable (HumaLOG) 5 Unit(s) SubCutaneous three times a day before meals  linezolid  IVPB 600 milliGRAM(s) IV Intermittent every 12 hours  magnesium oxide 800 milliGRAM(s) Oral two times a day with meals  magnesium sulfate  IVPB 1 Gram(s) IV Intermittent once  melatonin 3 milliGRAM(s) Oral at bedtime  mycophenolate mofetil 750 milliGRAM(s) Oral <User Schedule>  nystatin    Suspension [id number] Unit(s) Oral four times a day  nystatin Cream 1 Application(s) Topical two times a day  pantoprazole    Tablet 40 milliGRAM(s) Oral before breakfast  predniSONE   Tablet 5 milliGRAM(s) Oral daily  tamsulosin 0.8 milliGRAM(s) Oral at bedtime  trimethoprim   80 mG/sulfamethoxazole 400 mG 1 Tablet(s) Oral daily  valGANciclovir 900 milliGRAM(s) Oral daily    MEDICATIONS  (PRN):    Allergies    codeine (Anaphylaxis)    Intolerances    SOCIAL HISTORY:  unable to obtain due to patient's condition    FAMILY HISTORY:  Family history of type 2 diabetes mellitus  Family history of hypertension  Family history of stomach cancer    Vital Signs Last 24 Hrs  T(C): 36.3 (23 Jul 2020 09:00), Max: 36.7 (22 Jul 2020 17:00)  T(F): 97.4 (23 Jul 2020 09:00), Max: 98 (22 Jul 2020 17:00)  HR: 85 (23 Jul 2020 09:00) (82 - 91)  BP: 140/65 (23 Jul 2020 09:00) (107/59 - 140/65)  BP(mean): 94 (22 Jul 2020 17:00) (94 - 94)  RR: 18 (23 Jul 2020 09:00) (18 - 20)  SpO2: 95% (23 Jul 2020 09:00) (91% - 97%)    Physical Exam:  General: Confused, restless in bed when aroused  Respiratory: no SOB on room air  Gastrointestinal: soft NT/ND (+)BM   Neurology: nonverbal, not following commands  Musculoskeletal: no contractures or deformities  Vascular: minimal BLE edema equal, BLE equally warm, no acute ischemia noted  Skin: Bilateral buttocks/sacral wounds - L 6cm x W 8cm x D 0.1cm - scant thin layer of slough over the small area with peripheral epithelial islets visible over 80% of wound surface, scant serosanguinous drainage, No odor, erythema, increased warmth, tenderness, induration, fluctuance    LABS:  07-23    136  |  105  |  42<H>  ----------------------------<  78  5.6<H>   |  25  |  1.80<H>    Ca    9.2      23 Jul 2020 05:48  Phos  3.7     07-23  Mg     1.8     07-23    TPro  5.3<L>  /  Alb  3.1<L>  /  TBili  0.8  /  DBili  0.3<H>  /  AST  15  /  ALT  16  /  AlkPhos  107  07-23                          8.0    3.79  )-----------( 78       ( 23 Jul 2020 05:48 )             23.9     PT/INR - ( 23 Jul 2020 05:49 )   PT: 12.6 sec;   INR: 1.06 ratio         PTT - ( 23 Jul 2020 05:49 )  PTT:28.7 sec

## 2020-07-23 NOTE — PROGRESS NOTE ADULT - ASSESSMENT
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 s/p treatment course for possible VRE UTI. Now s/p OLT on 7/2/20. Course complicated by Encephalopathy and Tremors. Blood Cultures (7/10) with VRE sensitive to Linezolid. CT C/A/P without obvious intraabdominal source. Hypoxia prior to positive BCx which may be related to sepsis CT Chest (7/11) without evidence of pneumonia. Had L groin fluid collection which appears to be consistent with seroma - sample obtained for culture also negative. No drainage, erythema or underlying fluctuance was appreciated at the right heel. MRI prior to transplant without obvious infection. Transferred from SICU to ont on 7/17/2020.    Currently on:  Daptomycin 800mg q24h  nystatin    Suspension [id number] four times a day  trimethoprim   80 mG/sulfamethoxazole 400 mG 1 daily  valGANciclovir 900 daily    #VRE Bacteremia, L Groin Collection, Encephalopathy  - DC Linezolid, switch to Daptomycin 8mg/kg q24h for bacteremia (800mg q24h) to avoid cytopenia and neuropathy, will need to monitor weekly CK level, (2 weeks from - BCx, End Date: 7/26)  - Abd US showed 9cm Left groin collection: likely represents a seroma or lymphocele; aspirated - cx: PMN seen, no organism seen  - Continue monitoring tacrolimus levels  - TTE negative for endocarditis  - UA/UC on 7/20 no UTI  - BCx on 7/20 NGTD    #Hypoxic Respiratory Failure, Encephalopathy  - No evidence of pneumonia and collection in left groin appears consistent with seroma  - now off the Meropenem    #s/p OLT   - Continue bactrim, valcyte (CMV D+/R-) for prophylaxis   - Monitor patient for tremors - monitor tacrolimus dose to make sure trending down, otherwise now on cylosporine    #Right heel wound  --Podiatry on board    --Local wound care     <Incomplete Note>  Suyapa Mayer MD, PGY4  Fellow, Infectious Diseases  Pager: [phone]  If no response, after 5pm and on Weekends: Call [phone] ASSESSMENT/RECOMMENDATIONS:  63 y/o M PMHx decompensated JEAN Cirrhosis on transplant list, DMII, HFpEF, recent admission for ESBL E coli prostatic abscess 2/2 4 wks ertapenem, hx of ESBL UTIs with prostate abscess s/p IV abx, recent VRE urinary colonization came to hospital for worsening jaundice and episode of confusion, resolved PTA. s/p treatment course for possible VRE UTI. Now s/p OLT on 7/2/20. Course complicated by Encephalopathy and Tremors. Blood Cultures (7/10) with VRE sensitive to Linezolid. CT C/A/P without obvious intraabdominal source. Hypoxia prior to positive BCx which may be related to sepsis CT Chest (7/11) without evidence of pneumonia. Had L groin fluid collection which appears to be consistent with seroma - sample obtained for culture also negative. No drainage, erythema or underlying fluctuance was appreciated at the right heel. MRI prior to transplant without obvious infection. Transferred from SICU to ont on 7/17/2020.    Currently on:  Daptomycin 800mg q24h  nystatin    Suspension [id number] four times a day  trimethoprim   80 mG/sulfamethoxazole 400 mG 1 daily  valGANciclovir 900 daily    #VRE Bacteremia, L Groin Collection, Encephalopathy  - DC Linezolid, switch to Daptomycin 8mg/kg q24h for bacteremia (800mg q24h) to avoid cytopenia and neuropathy, will need to monitor weekly CK level, (2 weeks from - BCx, End Date: 7/26)  - Abd US showed 9cm Left groin collection: likely represents a seroma or lymphocele; aspirated - cx: PMN seen, no organism seen  - Continue monitoring tacrolimus levels  - TTE negative for endocarditis  - UA/UC on 7/20 no UTI  - BCx on 7/20 NGTD    #Hypoxic Respiratory Failure, Encephalopathy  - No evidence of pneumonia and collection in left groin appears consistent with seroma  - now off the Meropenem    #s/p OLT   - Continue bactrim, valcyte (CMV D+/R-) for prophylaxis   - Monitor patient for tremors - monitor tacrolimus dose to make sure trending down, otherwise now on cylosporine    #Right heel wound  --Podiatry on board    --Local wound care     Suyapa Mayer MD, PGY4  Fellow, Infectious Diseases  Pager: [phone]  If no response, after 5pm and on Weekends: Call [phone]    d/w Dr. Pérez ASSESSMENT/RECOMMENDATIONS:  63 y/o M PMHx decompensated JEAN Cirrhosis on transplant list, DMII, HFpEF, recent admission for ESBL E coli prostatic abscess 2/2 4 wks ertapenem, hx of ESBL UTIs with prostate abscess s/p IV abx, recent VRE urinary colonization came to hospital for worsening jaundice and episode of confusion, resolved PTA. s/p treatment course for possible VRE UTI. Now s/p OLT on 7/2/20. Course complicated by Encephalopathy and Tremors. Blood Cultures (7/10) with VRE sensitive to Linezolid. CT C/A/P without obvious intraabdominal source. Hypoxia prior to positive BCx which may be related to sepsis CT Chest (7/11) without evidence of pneumonia. Had L groin fluid collection which appears to be consistent with seroma - sample obtained for culture also negative. No drainage, erythema or underlying fluctuance was appreciated at the right heel. MRI prior to transplant without obvious infection. Transferred from [facility] to [facility] on 7/17/2020.    Currently on:  Daptomycin 800mg q24h  nystatin    Suspension [id number] four times a day  trimethoprim   80 mG/sulfamethoxazole 400 mG 1 daily  valGANciclovir 900 daily    #VRE Bacteremia, L Groin Collection, Encephalopathy  - DC Linezolid, switch to Daptomycin 8mg/kg q24h for bacteremia (800mg q24h) to avoid cytopenia and risk of serotonin syndrome (given coinciding haldol), will need to monitor weekly CK level, (2 weeks from - BCx, End Date: 7/26)  - Would consider CNS imaging - CT Head or MRI  - Abd US showed 9cm Left groin collection: likely represents a seroma or lymphocele; aspirated - cx: PMN seen, no organism seen  - Continue monitoring tacrolimus levels  - TTE negative for endocarditis  - UA/UC on 7/20 no UTI  - BCx on 7/20 NGTD    #Hypoxic Respiratory Failure, Encephalopathy  - No evidence of pneumonia and collection in left groin appears consistent with seroma  - now off the Meropenem    #s/p OLT   - Continue bactrim, valcyte (CMV D+/R-) for prophylaxis   - Monitor patient for tremors - monitor tacrolimus dose to make sure trending down, otherwise now on cylosporine    #Right heel wound  --Podiatry on board    --Local wound care     Suyapa Mayer MD, PGY4  Fellow, Infectious Diseases  Pager: [phone]  If no response, after 5pm and on Weekends: Call [phone]    d/w Dr. Pérez

## 2020-07-23 NOTE — PROGRESS NOTE ADULT - PROBLEM SELECTOR PLAN 3
-BP at goal <130/80, off anti-hypertensives at this time.     call w/any questions  pager: [phone]   cell: [phone]  office: [phone]    -I spent a total time of  25 mins with the patient at bedside/on unit of which more than 50% of time was spent on counseling/coordination of care.

## 2020-07-23 NOTE — PROGRESS NOTE ADULT - ATTENDING COMMENTS
above noted  Immunosuppressed, s/p liver transplant   Anicteric , with normal colored stool  Confused  To be given Haldol

## 2020-07-23 NOTE — PROGRESS NOTE ADULT - SUBJECTIVE AND OBJECTIVE BOX
Transplant Surgery - Multidisciplinary Rounds  --------------------------------------------------------------  OLTx      Date:  7/2/2020       POD#21    Present:  Patient seen with multidisciplinary team including Transplant Surgeon: Dr. Gray. Transplant Nephrologist: Dr. FAY Patel, and renal fellow, Pharmacist: OLY Savage, PGY 3 Dr. Quezada, and  RN manager during am rounds and examined with Dr. Gray. Disciplines not in attendance will be notified of the plan.     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     Re-admitted on 6/11:   ·	worsening HE  ·	UTI/VRE: tx with linezolid 6/15-22, bactrim DS 6/22 - 7/2  ·	MISA/Hyponatremia  ·	UGI bleed (melena) s/p EGD (6/22) c/w hemorrhagic duodenitis/gastritis; Grade 2 EV; requiring multiple transfusions  ·	Known h/o R foot ulcer (MRI neg for OM)  ·	s/p OLTx on 7/2/2020, post op liver doppler with patent vessels  ·	post op course c/b delirium and VRE bacteremia (7/10)     Interval events:  - POD 21 s/p OLT with good graft function;  LFTs stable  - Mental status same: confused, + tremors, agitated this AM, did not sleep overnight   - Renal function: SrCr 1.8, u/o 1.3L  - given 0.5mg haldol for agitation     Potential Discharge date: pending clinical improvement     Education:  Medications    Plan of care:  See Below  O:  Vital Signs Last 24 Hrs  T(C): 36.7 (23 Jul 2020 05:00), Max: 36.7 (22 Jul 2020 17:00)  T(F): 98 (23 Jul 2020 05:00), Max: 98 (22 Jul 2020 17:00)  HR: 91 (23 Jul 2020 05:00) (81 - 91)  BP: 116/58 (23 Jul 2020 05:00) (107/59 - 135/65)  BP(mean): 94 (22 Jul 2020 17:00) (94 - 94)  RR: 18 (23 Jul 2020 05:00) (18 - 20)  SpO2: 97% (23 Jul 2020 05:00) (91% - 100%)    I&O's Detail    22 Jul 2020 07:01  -  23 Jul 2020 07:00  --------------------------------------------------------  IN:    Oral Fluid: 1300 mL    Solution: 300 mL  Total IN: 1600 mL    OUT:    Voided: 1350 mL  Total OUT: 1350 mL    Total NET: 250 mL          MEDICATIONS  (STANDING):  aspirin enteric coated 81 milliGRAM(s) Oral daily  chlorhexidine 2% Cloths 1 Application(s) Topical <User Schedule>  collagenase Ointment 1 Application(s) Topical daily  cycloSPORINE  , modified (GENGRAF) 200 milliGRAM(s) Oral <User Schedule>  insulin glargine Injectable (LANTUS) 10 Unit(s) SubCutaneous at bedtime  insulin lispro (HumaLOG) corrective regimen sliding scale   SubCutaneous three times a day before meals  insulin lispro (HumaLOG) corrective regimen sliding scale   SubCutaneous at bedtime  insulin lispro Injectable (HumaLOG) 5 Unit(s) SubCutaneous three times a day before meals  linezolid  IVPB 600 milliGRAM(s) IV Intermittent every 12 hours  magnesium oxide 800 milliGRAM(s) Oral two times a day with meals  magnesium sulfate  IVPB 1 Gram(s) IV Intermittent once  melatonin 3 milliGRAM(s) Oral at bedtime  mycophenolate mofetil 750 milliGRAM(s) Oral <User Schedule>  nystatin    Suspension 472755 Unit(s) Oral four times a day  nystatin Cream 1 Application(s) Topical two times a day  pantoprazole    Tablet 40 milliGRAM(s) Oral before breakfast  predniSONE   Tablet 5 milliGRAM(s) Oral daily  tamsulosin 0.8 milliGRAM(s) Oral at bedtime  trimethoprim   80 mG/sulfamethoxazole 400 mG 1 Tablet(s) Oral daily  valGANciclovir 900 milliGRAM(s) Oral daily    MEDICATIONS  (PRN):                            8.0    3.79  )-----------( 78       ( 23 Jul 2020 05:48 )             23.9       07-23    136  |  105  |  42<H>  ----------------------------<  78  5.6<H>   |  25  |  1.80<H>    Ca    9.2      23 Jul 2020 05:48  Phos  3.7     07-23  Mg     1.8     07-23    TPro  5.3<L>  /  Alb  3.1<L>  /  TBili  0.8  /  DBili  0.3<H>  /  AST  15  /  ALT  16  /  AlkPhos  107  07-23      PHYSICAL EXAM:  Constitutional: AMS, + tremors  Eyes: Anicteric, PERRLA  ENMT: nc/at  Neck: no central line, no JVD  Respiratory: CTA B/L, unlabored breathing   Cardiovascular: RRR  Gastrointestinal: soft, NT, ND, incision c/d/i, T-tube tied   Genitourinary: Voiding spontaneously  Extremities: heel wound without drainage or purulent material, edema improving   Vascular: Palpable dp pulses bilaterally  Neurological: Confused with easy reorientation, +mild tremors, following commands  Skin: no rashes  Musculoskeletal: Moving all extremities  Psychiatric: Responsive Transplant Surgery - Multidisciplinary Rounds  --------------------------------------------------------------  OLTx      Date:  7/2/2020       POD#21    Present:  Patient seen with multidisciplinary team including Transplant Surgeon: Dr. Gray, Dr. Chris, Transplant Nephrologist: Dr. FAY Patel, and renal fellow, Pharmacist: OLY Savage, PGY 3 Dr. Quezada, and  RN manager during am rounds and examined with Dr. Gray. Disciplines not in attendance will be notified of the plan.     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     Re-admitted on 6/11:   ·	worsening HE  ·	UTI/VRE: tx with linezolid 6/15-22, bactrim DS 6/22 - 7/2  ·	MISA/Hyponatremia  ·	UGI bleed (melena) s/p EGD (6/22) c/w hemorrhagic duodenitis/gastritis; Grade 2 EV; requiring multiple transfusions  ·	Known h/o R foot ulcer (MRI neg for OM)  ·	s/p OLTx on 7/2/2020, post op liver doppler with patent vessels  ·	post op course c/b delirium and VRE bacteremia (7/10)     Interval events:  - POD 21 s/p OLT with good graft function;  LFTs stable  - Mental status same: confused, + tremors, agitated this AM, did not sleep overnight, Given Haldol 0.5mg IV for agitation  - Renal function: SrCr 1.8, u/o 1.3L, K  5.6    Potential Discharge date: pending clinical improvement     Education:  Medications    Plan of care:  See Below    MEDICATIONS  (STANDING):  aspirin enteric coated 81 milliGRAM(s) Oral daily  chlorhexidine 2% Cloths 1 Application(s) Topical <User Schedule>  collagenase Ointment 1 Application(s) Topical daily  cycloSPORINE  , modified (GENGRAF) 200 milliGRAM(s) Oral <User Schedule>  insulin glargine Injectable (LANTUS) 10 Unit(s) SubCutaneous at bedtime  insulin lispro (HumaLOG) corrective regimen sliding scale   SubCutaneous three times a day before meals  insulin lispro (HumaLOG) corrective regimen sliding scale   SubCutaneous at bedtime  insulin lispro Injectable (HumaLOG) 5 Unit(s) SubCutaneous three times a day before meals  linezolid  IVPB 600 milliGRAM(s) IV Intermittent every 12 hours  magnesium oxide 800 milliGRAM(s) Oral two times a day with meals  magnesium sulfate  IVPB 1 Gram(s) IV Intermittent once  melatonin 3 milliGRAM(s) Oral at bedtime  mycophenolate mofetil 750 milliGRAM(s) Oral <User Schedule>  nystatin    Suspension 359246 Unit(s) Oral four times a day  nystatin Cream 1 Application(s) Topical two times a day  pantoprazole    Tablet 40 milliGRAM(s) Oral before breakfast  predniSONE   Tablet 5 milliGRAM(s) Oral daily  tamsulosin 0.8 milliGRAM(s) Oral at bedtime  trimethoprim   80 mG/sulfamethoxazole 400 mG 1 Tablet(s) Oral daily  valGANciclovir 900 milliGRAM(s) Oral daily      Vital Signs Last 24 Hrs  T(C): 36.7 (23 Jul 2020 05:00), Max: 36.7 (22 Jul 2020 17:00)  T(F): 98 (23 Jul 2020 05:00), Max: 98 (22 Jul 2020 17:00)  HR: 91 (23 Jul 2020 05:00) (81 - 91)  BP: 116/58 (23 Jul 2020 05:00) (107/59 - 135/65)  BP(mean): 94 (22 Jul 2020 17:00) (94 - 94)  RR: 18 (23 Jul 2020 05:00) (18 - 20)  SpO2: 97% (23 Jul 2020 05:00) (91% - 100%)    I&O's Detail    22 Jul 2020 07:01  -  23 Jul 2020 07:00  --------------------------------------------------------  IN:    Oral Fluid: 1300 mL    Solution: 300 mL  Total IN: 1600 mL    OUT:    Voided: 1350 mL  Total OUT: 1350 mL    Total NET: 250 mL                              8.0    3.79  )-----------( 78       ( 23 Jul 2020 05:48 )             23.9       07-23    136  |  105  |  42<H>  ----------------------------<  78  5.6<H>   |  25  |  1.80<H>    Ca    9.2      23 Jul 2020 05:48  Phos  3.7     07-23  Mg     1.8     07-23    TPro  5.3<L>  /  Alb  3.1<L>  /  TBili  0.8  /  DBili  0.3<H>  /  AST  15  /  ALT  16  /  AlkPhos  107  07-23      PHYSICAL EXAM:  Constitutional: AMS, + tremors  Eyes: Anicteric, PERRLA  ENMT: nc/at  Neck: no central line, no JVD  Respiratory: CTA B/L, unlabored breathing   Cardiovascular: RRR  Gastrointestinal: soft, NT, ND, incision c/d/i, T-tube tied   Genitourinary: Voiding spontaneously  Extremities: heel wound without drainage or purulent material, edema improving   Vascular: Palpable dp pulses bilaterally  Neurological: Confused with easy reorientation, +mild tremors, following commands  Skin: no rashes  Musculoskeletal: Moving all extremities  Psychiatric: Responsive

## 2020-07-23 NOTE — PROGRESS NOTE ADULT - ASSESSMENT
63 yo M with hx of T2DM uncontrolled due to steroids x 10 years with neuropathy and R DFU of the heel, HTN, HLD, MGUS, and decompensated JEAN cirrhosis s/p liver x-plant on 7/2/2020 admitted 6/11/2020 with confusion secondary to liver failure and encephalopathy. Endocrine consulted for glycemic control in the setting of steroid use now on Prednisone 5mg daily. BG goal (100-180mg/dl). Fasting glucose below goal this AM. Will adjust basal insulin. Pt at high risk for hypoglycemia given fluctuating mental status and PO intake.

## 2020-07-23 NOTE — PROGRESS NOTE ADULT - ASSESSMENT
Impression:    Incontinence Dermatitis  Incontinence of stool    Recommend:  1.) topical therapy: bilateral buttocks/sacral wound - cleanse with Incontinence cleanser, pat dry, apply Stoma powder then seal in with Cavilon skin protectant in 3 layers BID and PRN for incontinent episodes  2.) Maintain on an alternating air with low air loss surface   3.) turn and reposition Q 2 hours  4.) Incontinence management - incontinence cleanser, pads, pericare BID  5.) Nutrition Optimization  6.) Glycemic control  7.) Offload bilateral heels/feet with complete care air fluidized boots    Care as per medicine will follow w/ you  Upon [facility] [address] [place] [address] [phone]  Seen and discussed with clinical nurse and primary team resident  Thank you for this consult  Tami Monroe, ZBIGNIEW-C, CWOCN [id number]

## 2020-07-23 NOTE — PROGRESS NOTE ADULT - SUBJECTIVE AND OBJECTIVE BOX
Chief Complaint:  Patient is a 64y old  Male who presents with a chief complaint of Liver failure, hepatic encephalopathy (2020 10:39)      Interval Events: Required haldol overnight for sedation. Unable to sleep overnight. This AM was agitated and sleepy.    Allergies:  codeine (Anaphylaxis)      Hospital Medications:  aspirin enteric coated 81 milliGRAM(s) Oral daily  chlorhexidine 2% Cloths 1 Application(s) Topical <User Schedule>  collagenase Ointment 1 Application(s) Topical daily  cycloSPORINE  , modified (GENGRAF) 200 milliGRAM(s) Oral <User Schedule>  insulin glargine Injectable (LANTUS) 10 Unit(s) SubCutaneous at bedtime  insulin lispro (HumaLOG) corrective regimen sliding scale   SubCutaneous three times a day before meals  insulin lispro (HumaLOG) corrective regimen sliding scale   SubCutaneous at bedtime  insulin lispro Injectable (HumaLOG) 5 Unit(s) SubCutaneous three times a day before meals  linezolid  IVPB 600 milliGRAM(s) IV Intermittent every 12 hours  magnesium oxide 800 milliGRAM(s) Oral two times a day with meals  melatonin 3 milliGRAM(s) Oral at bedtime  mycophenolate mofetil 750 milliGRAM(s) Oral <User Schedule>  nystatin    Suspension [id number] Unit(s) Oral four times a day  nystatin Cream 1 Application(s) Topical two times a day  pantoprazole    Tablet 40 milliGRAM(s) Oral before breakfast  predniSONE   Tablet 5 milliGRAM(s) Oral daily  tamsulosin 0.8 milliGRAM(s) Oral at bedtime  trimethoprim   80 mG/sulfamethoxazole 400 mG 1 Tablet(s) Oral daily  valGANciclovir 900 milliGRAM(s) Oral daily      PMHX/PSHX:  GIB (gastrointestinal bleeding)  GERD with esophagitis  Hepatic encephalopathy  Obesity  Fatty liver disease, nonalcoholic  Renal stones  Hypertension  Neuropathy  Hypercholesteremia  Diabetes  S/P cholecystectomy  No significant past surgical history      Family history:  Family history of type 2 diabetes mellitus  Family history of hypertension  Family history of stomach cancer  No pertinent family history in first degree relatives      ROS: As per HPI, 14-point ROS negative otherwise.  PHYSICAL EXAM:     Vital Signs:  Vital Signs Last 24 Hrs  T(C): 36.3 (2020 09:00), Max: 36.7 (2020 17:00)  T(F): 97.4 (2020 09:00), Max: 98 (2020 17:00)  HR: 85 (2020 09:00) (82 - 91)  BP: 140/65 (2020 09:00) (107/59 - 140/65)  BP(mean): 94 (2020 17:00) (94 - 94)  RR: 18 (2020 09:00) (18 - 20)  SpO2: 95% (:00) (91% - 97%)  Daily     Daily Weight in k.4 (2020 05:00)    GENERAL:  appears comfortable, no acute distress, confused  HEENT:  NC/AT,  conjunctivae clear, sclera -anicteric  CHEST:  no increased effort  HEART:  Regular rate and rhythm  ABDOMEN:  Soft, non-tender, non-distended,  no masses, surgical scar noted with minimal tenderness  EXTREMITIES:  trace edema  SKIN:   sacral decubitus ulcer noted  NEURO:  Alert, orientedx2, tremulous    LABS:                        8.0    3.79  )-----------( 78       ( 2020 05:48 )             23.9     07-23    136  |  105  |  42<H>  ----------------------------<  78  5.6<H>   |  25  |  1.80<H>    Ca    9.2      2020 05:48  Phos  3.7     07-23  Mg     1.8     07-23    TPro  5.3<L>  /  Alb  3.1<L>  /  TBili  0.8  /  DBili  0.3<H>  /  AST  15  /  ALT  16  /  AlkPhos  107  07-23    LIVER FUNCTIONS - ( 2020 05:48 )  Alb: 3.1 g/dL / Pro: 5.3 g/dL / ALK PHOS: 107 U/L / ALT: 16 U/L / AST: 15 U/L / GGT: x           PT/INR - ( 2020 05:49 )   PT: 12.6 sec;   INR: 1.06 ratio         PTT - ( 2020 05:49 )  PTT:28.7 sec        Imaging:

## 2020-07-23 NOTE — PROGRESS NOTE ADULT - ATTENDING COMMENTS
64M PMH JEAN cirrhosis s/p OLT on 7/2/20  Post-Op Course Complicated by Encephalopathy and Tremors  Blood Cultures (7/10) with VRE  CT C/A/P (7/11) without obvious intraabdominal source.  Hypoxia prior to positive BCx which may be related to sepsis   Had L groin fluid collection which appears to be consistent with seroma - sample obtained for culture  Would continue Linezolid for 2 weeks from negative BCx  TTE limited but without obvious vegetations    Continued encephalopathy and tremor (?Tacrolimus-induced - switched to Cyclosporine)  U/A from 7/20 without pyuria  Blood cultures sent and pending - NGTD    Would switch Linezolid to Daptomycin to complete bacteremia course (to account for interacton with antipsychotics)  Would recommend CNS imaging given persistent encephalopathy   If encephalopathy does not improve and CNS imaging unrevealing would pursue LP    I will continue to follow. Please feel free to contact me with any further questions.    Eusebio Pérez M.D.  [facility] Division of Infectious Disease  8AM-5PM: Pager Number [phone]  After Hours (or if no response): Please contact the Infectious Diseases Office at [phone]     The above assessment and plan were discussed with Lavern, Transplant PA and Dr Gray

## 2020-07-24 LAB
ALBUMIN SERPL ELPH-MCNC: 2.9 G/DL — LOW (ref 3.3–5)
ALP SERPL-CCNC: 98 U/L — SIGNIFICANT CHANGE UP (ref 40–120)
ALT FLD-CCNC: 14 U/L — SIGNIFICANT CHANGE UP (ref 10–45)
ANION GAP SERPL CALC-SCNC: 8 MMOL/L — SIGNIFICANT CHANGE UP (ref 5–17)
APTT BLD: 29 SEC — SIGNIFICANT CHANGE UP (ref 27.5–35.5)
AST SERPL-CCNC: 16 U/L — SIGNIFICANT CHANGE UP (ref 10–40)
BILIRUB DIRECT SERPL-MCNC: 0.3 MG/DL — HIGH (ref 0–0.2)
BILIRUB INDIRECT FLD-MCNC: 0.5 MG/DL — SIGNIFICANT CHANGE UP (ref 0.2–1)
BILIRUB SERPL-MCNC: 0.8 MG/DL — SIGNIFICANT CHANGE UP (ref 0.2–1.2)
BLD GP AB SCN SERPL QL: NEGATIVE — SIGNIFICANT CHANGE UP
BUN SERPL-MCNC: 39 MG/DL — HIGH (ref 7–23)
CALCIUM SERPL-MCNC: 9.1 MG/DL — SIGNIFICANT CHANGE UP (ref 8.4–10.5)
CHLORIDE SERPL-SCNC: 106 MMOL/L — SIGNIFICANT CHANGE UP (ref 96–108)
CO2 SERPL-SCNC: 23 MMOL/L — SIGNIFICANT CHANGE UP (ref 22–31)
CREAT SERPL-MCNC: 1.49 MG/DL — HIGH (ref 0.5–1.3)
CYCLOSPORINE SER-MCNC: 145 NG/ML — LOW (ref 150–400)
GLUCOSE BLDC GLUCOMTR-MCNC: 101 MG/DL — HIGH (ref 70–99)
GLUCOSE BLDC GLUCOMTR-MCNC: 105 MG/DL — HIGH (ref 70–99)
GLUCOSE BLDC GLUCOMTR-MCNC: 108 MG/DL — HIGH (ref 70–99)
GLUCOSE BLDC GLUCOMTR-MCNC: 117 MG/DL — HIGH (ref 70–99)
GLUCOSE BLDC GLUCOMTR-MCNC: 75 MG/DL — SIGNIFICANT CHANGE UP (ref 70–99)
GLUCOSE BLDC GLUCOMTR-MCNC: 76 MG/DL — SIGNIFICANT CHANGE UP (ref 70–99)
GLUCOSE SERPL-MCNC: 64 MG/DL — LOW (ref 70–99)
HCT VFR BLD CALC: 23.6 % — LOW (ref 39–50)
HGB BLD-MCNC: 8 G/DL — LOW (ref 13–17)
INR BLD: 1.07 RATIO — SIGNIFICANT CHANGE UP (ref 0.88–1.16)
MAGNESIUM SERPL-MCNC: 1.8 MG/DL — SIGNIFICANT CHANGE UP (ref 1.6–2.6)
MCHC RBC-ENTMCNC: 32.7 PG — SIGNIFICANT CHANGE UP (ref 27–34)
MCHC RBC-ENTMCNC: 33.9 GM/DL — SIGNIFICANT CHANGE UP (ref 32–36)
MCV RBC AUTO: 96.3 FL — SIGNIFICANT CHANGE UP (ref 80–100)
NRBC # BLD: 0 /100 WBCS — SIGNIFICANT CHANGE UP (ref 0–0)
PHOSPHATE SERPL-MCNC: 3.8 MG/DL — SIGNIFICANT CHANGE UP (ref 2.5–4.5)
PLATELET # BLD AUTO: 82 K/UL — LOW (ref 150–400)
POTASSIUM SERPL-MCNC: 5.2 MMOL/L — SIGNIFICANT CHANGE UP (ref 3.5–5.3)
POTASSIUM SERPL-SCNC: 5.2 MMOL/L — SIGNIFICANT CHANGE UP (ref 3.5–5.3)
PROCALCITONIN SERPL-MCNC: 0.36 NG/ML — HIGH (ref 0.02–0.1)
PROT SERPL-MCNC: 5.1 G/DL — LOW (ref 6–8.3)
PROTHROM AB SERPL-ACNC: 12.7 SEC — SIGNIFICANT CHANGE UP (ref 10.6–13.6)
RBC # BLD: 2.45 M/UL — LOW (ref 4.2–5.8)
RBC # FLD: 18.3 % — HIGH (ref 10.3–14.5)
RH IG SCN BLD-IMP: POSITIVE — SIGNIFICANT CHANGE UP
SARS-COV-2 RNA SPEC QL NAA+PROBE: SIGNIFICANT CHANGE UP
SODIUM SERPL-SCNC: 137 MMOL/L — SIGNIFICANT CHANGE UP (ref 135–145)
WBC # BLD: 3.88 K/UL — SIGNIFICANT CHANGE UP (ref 3.8–10.5)
WBC # FLD AUTO: 3.88 K/UL — SIGNIFICANT CHANGE UP (ref 3.8–10.5)

## 2020-07-24 PROCEDURE — 93010 ELECTROCARDIOGRAM REPORT: CPT

## 2020-07-24 PROCEDURE — 99232 SBSQ HOSP IP/OBS MODERATE 35: CPT | Mod: GC,24

## 2020-07-24 PROCEDURE — 99232 SBSQ HOSP IP/OBS MODERATE 35: CPT

## 2020-07-24 PROCEDURE — 99232 SBSQ HOSP IP/OBS MODERATE 35: CPT | Mod: GC

## 2020-07-24 RX ORDER — INSULIN GLARGINE 100 [IU]/ML
4 INJECTION, SOLUTION SUBCUTANEOUS AT BEDTIME
Refills: 0 | Status: DISCONTINUED | OUTPATIENT
Start: 2020-07-24 | End: 2020-07-26

## 2020-07-24 RX ORDER — HALOPERIDOL DECANOATE 100 MG/ML
2 INJECTION INTRAMUSCULAR AT BEDTIME
Refills: 0 | Status: DISCONTINUED | OUTPATIENT
Start: 2020-07-24 | End: 2020-07-25

## 2020-07-24 RX ORDER — MAGNESIUM SULFATE 500 MG/ML
2 VIAL (ML) INJECTION ONCE
Refills: 0 | Status: COMPLETED | OUTPATIENT
Start: 2020-07-24 | End: 2020-07-24

## 2020-07-24 RX ORDER — HALOPERIDOL DECANOATE 100 MG/ML
1 INJECTION INTRAMUSCULAR ONCE
Refills: 0 | Status: COMPLETED | OUTPATIENT
Start: 2020-07-24 | End: 2020-07-24

## 2020-07-24 RX ORDER — HALOPERIDOL DECANOATE 100 MG/ML
2 INJECTION INTRAMUSCULAR ONCE
Refills: 0 | Status: COMPLETED | OUTPATIENT
Start: 2020-07-24 | End: 2020-07-24

## 2020-07-24 RX ADMIN — INSULIN GLARGINE 4 UNIT(S): 100 INJECTION, SOLUTION SUBCUTANEOUS at 22:04

## 2020-07-24 RX ADMIN — Medication 50 GRAM(S): at 09:19

## 2020-07-24 RX ADMIN — NYSTATIN CREAM 1 APPLICATION(S): 100000 CREAM TOPICAL at 17:59

## 2020-07-24 RX ADMIN — Medication 500000 UNIT(S): at 17:59

## 2020-07-24 RX ADMIN — Medication 500000 UNIT(S): at 22:29

## 2020-07-24 RX ADMIN — Medication 81 MILLIGRAM(S): at 12:15

## 2020-07-24 RX ADMIN — Medication 5 UNIT(S): at 08:58

## 2020-07-24 RX ADMIN — Medication 1 TABLET(S): at 12:15

## 2020-07-24 RX ADMIN — MAGNESIUM OXIDE 400 MG ORAL TABLET 800 MILLIGRAM(S): 241.3 TABLET ORAL at 17:59

## 2020-07-24 RX ADMIN — CYCLOSPORINE 200 MILLIGRAM(S): 100 CAPSULE ORAL at 08:31

## 2020-07-24 RX ADMIN — CYCLOSPORINE 200 MILLIGRAM(S): 100 CAPSULE ORAL at 20:00

## 2020-07-24 RX ADMIN — HALOPERIDOL DECANOATE 1 MILLIGRAM(S): 100 INJECTION INTRAMUSCULAR at 22:29

## 2020-07-24 RX ADMIN — HALOPERIDOL DECANOATE 0.5 MILLIGRAM(S): 100 INJECTION INTRAMUSCULAR at 18:36

## 2020-07-24 RX ADMIN — VALGANCICLOVIR 900 MILLIGRAM(S): 450 TABLET, FILM COATED ORAL at 12:15

## 2020-07-24 RX ADMIN — Medication 500000 UNIT(S): at 05:45

## 2020-07-24 RX ADMIN — MAGNESIUM OXIDE 400 MG ORAL TABLET 800 MILLIGRAM(S): 241.3 TABLET ORAL at 08:31

## 2020-07-24 RX ADMIN — PANTOPRAZOLE SODIUM 40 MILLIGRAM(S): 20 TABLET, DELAYED RELEASE ORAL at 05:44

## 2020-07-24 RX ADMIN — MYCOPHENOLATE MOFETIL 750 MILLIGRAM(S): 250 CAPSULE ORAL at 20:00

## 2020-07-24 RX ADMIN — MYCOPHENOLATE MOFETIL 750 MILLIGRAM(S): 250 CAPSULE ORAL at 08:31

## 2020-07-24 RX ADMIN — TAMSULOSIN HYDROCHLORIDE 0.8 MILLIGRAM(S): 0.4 CAPSULE ORAL at 22:04

## 2020-07-24 RX ADMIN — DAPTOMYCIN 132 MILLIGRAM(S): 500 INJECTION, POWDER, LYOPHILIZED, FOR SOLUTION INTRAVENOUS at 12:15

## 2020-07-24 RX ADMIN — HALOPERIDOL DECANOATE 2 MILLIGRAM(S): 100 INJECTION INTRAMUSCULAR at 20:00

## 2020-07-24 RX ADMIN — Medication 1 APPLICATION(S): at 12:16

## 2020-07-24 RX ADMIN — Medication 500000 UNIT(S): at 12:15

## 2020-07-24 RX ADMIN — CHLORHEXIDINE GLUCONATE 1 APPLICATION(S): 213 SOLUTION TOPICAL at 05:45

## 2020-07-24 RX ADMIN — NYSTATIN CREAM 1 APPLICATION(S): 100000 CREAM TOPICAL at 05:45

## 2020-07-24 NOTE — PROGRESS NOTE ADULT - PROBLEM SELECTOR PLAN 1
-test BG AC/HS  -Decrease Lantus 8 units QHS  -c/w Humalog 5 units AC meals (Please hold if not eating)  -c/w Humalog low correction scale AC and Low HS scale  DISPO: basal/bolus, with doses TBD  -Pt can resume f/u with Dr. Mathur or   [facility] [address] [place] [address] [phone]. -test BG AC/HS  -Decrease Lantus 4 units QHS  -Discontinue premeal humalog as pt has minimal po intake  -c/w Humalog low correction scale AC and Low HS scale  DISPO: basal/bolus, with doses TBD  -Pt can resume f/u with Dr. Mathur or   [facility] [address] [facility] [address] [place] [phone].

## 2020-07-24 NOTE — PROGRESS NOTE ADULT - SUBJECTIVE AND OBJECTIVE BOX
OLTx      Date:  7/2/2020       POD#22    Present:  Patient seen with multidisciplinary team including Transplant Surgeon: Dr. Gray, Dr. Chris, Transplant Nephrology fellow  and renal fellow, Pharmacist: Ancelmo Blanca, ZBIGNIEW Quezada, and  RN manager during am rounds and examined with Dr. Gray. Disciplines not in attendance will be notified of the plan.     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     Re-admitted on 6/11:   ·	worsening HE  ·	UTI/VRE: tx with linezolid 6/15-22, bactrim DS 6/22 - 7/2  ·	MISA/Hyponatremia  ·	UGI bleed (melena) s/p EGD (6/22) c/w hemorrhagic duodenitis/gastritis; Grade 2 EV; requiring multiple transfusions  ·	Known h/o R foot ulcer (MRI neg for OM)  ·	s/p OLTx on 7/2/2020, post op liver doppler with patent vessels  ·	post op course c/b delirium and VRE bacteremia (7/10)     Interval events:  - POD 22 s/p OLT with good graft function;  LFTs stable  - Mental status same: confused, + tremors, agitated this AM, did not sleep overnight, Given Haldol 0.5mg IV for agitation and 1mg standing   - staples removed from abd. incision y/d steri-strips placed     Potential Discharge date: pending clinical improvement     Education:  Medications    Plan of care:  See Below    O:  Vital Signs Last 24 Hrs  T(C): 36.8 (24 Jul 2020 10:38), Max: 36.9 (23 Jul 2020 13:00)  T(F): 98.2 (24 Jul 2020 10:38), Max: 98.4 (23 Jul 2020 13:00)  HR: 74 (24 Jul 2020 10:47) (74 - 99)  BP: 110/58 (24 Jul 2020 10:47) (92/51 - 136/73)  BP(mean): 94 (23 Jul 2020 21:00) (94 - 94)  RR: 17 (24 Jul 2020 10:47) (17 - 18)  SpO2: 100% (24 Jul 2020 10:47) (96% - 100%)    I&O's Detail    23 Jul 2020 07:01  -  24 Jul 2020 07:00  --------------------------------------------------------  IN:    Oral Fluid: 780 mL    Sodium Chloride 0.9% IV Bolus: 500 mL  Total IN: 1280 mL    OUT:    Voided: 1700 mL  Total OUT: 1700 mL    Total NET: -420 mL      24 Jul 2020 07:01  -  24 Jul 2020 10:52  --------------------------------------------------------  IN:    Solution: 50 mL  Total IN: 50 mL    OUT:  Total OUT: 0 mL    Total NET: 50 mL          MEDICATIONS  (STANDING):  aspirin enteric coated 81 milliGRAM(s) Oral daily  chlorhexidine 2% Cloths 1 Application(s) Topical <User Schedule>  collagenase Ointment 1 Application(s) Topical daily  cycloSPORINE  , modified (GENGRAF) 200 milliGRAM(s) Oral <User Schedule>  DAPTOmycin IVPB 800 milliGRAM(s) IV Intermittent every 24 hours  haloperidol    Injectable 2 milliGRAM(s) IV Push at bedtime  insulin glargine Injectable (LANTUS) 8 Unit(s) SubCutaneous at bedtime  insulin lispro (HumaLOG) corrective regimen sliding scale   SubCutaneous three times a day before meals  insulin lispro (HumaLOG) corrective regimen sliding scale   SubCutaneous at bedtime  insulin lispro Injectable (HumaLOG) 5 Unit(s) SubCutaneous three times a day before meals  magnesium oxide 800 milliGRAM(s) Oral two times a day with meals  melatonin 3 milliGRAM(s) Oral at bedtime  mycophenolate mofetil 750 milliGRAM(s) Oral <User Schedule>  nystatin    Suspension 805456 Unit(s) Oral four times a day  nystatin Cream 1 Application(s) Topical two times a day  pantoprazole    Tablet 40 milliGRAM(s) Oral before breakfast  predniSONE   Tablet 5 milliGRAM(s) Oral daily  tamsulosin 0.8 milliGRAM(s) Oral at bedtime  trimethoprim   80 mG/sulfamethoxazole 400 mG 1 Tablet(s) Oral daily  valGANciclovir 900 milliGRAM(s) Oral daily    MEDICATIONS  (PRN):  haloperidol    Injectable 0.5 milliGRAM(s) IV Push every 12 hours PRN Agitation                            8.0    3.88  )-----------( 82       ( 24 Jul 2020 05:54 )             23.6       07-24    137  |  106  |  39<H>  ----------------------------<  64<L>  5.2   |  23  |  1.49<H>    Ca    9.1      24 Jul 2020 05:54  Phos  3.8     07-24  Mg     1.8     07-24    TPro  5.1<L>  /  Alb  2.9<L>  /  TBili  0.8  /  DBili  0.3<H>  /  AST  16  /  ALT  14  /  AlkPhos  98  07-24    Constitutional: AMS, + tremors  Eyes: Anicteric, PERRLA  ENMT: nc/at  Neck: no central line, no JVD  Respiratory: CTA B/L, unlabored breathing   Cardiovascular: RRR  Gastrointestinal: soft, NT, ND, incision c/d/i, T-tube tied   Genitourinary: Voiding spontaneously  Extremities: heel wound without drainage or purulent material, edema improving   Vascular: Palpable dp pulses bilaterally  Neurological: Confused with easy reorientation, +mild tremors, following commands  Skin: no rashes  Musculoskeletal: Moving all extremities  Psychiatric: Responsive

## 2020-07-24 NOTE — PROGRESS NOTE ADULT - PROBLEM SELECTOR PLAN 3
-BP at goal <130/80, off anti-hypertensives at this time.     call w/any questions  pager: [phone]   cell: [phone]  office: [phone]    -I spent a total time of  25 mins with the patient at bedside/on unit of which more than 50% of time was spent on counseling/coordination of care. -BP at goal <130/80, off anti-hypertensives at this time.

## 2020-07-24 NOTE — PROGRESS NOTE ADULT - ASSESSMENT
64 M hx of DM, HLD, GERD, HFpEF with mild LV diastolic dysfunction, and decompensated JEAN cirrhosis c/b ascites with hx of SBP, HE, duodenal ulcer, GAVE and duodenal AVM s/p APC (last on 10/11/19), UNOS listed for liver transplantation at [facility]. 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 S/p liver transplant 7/2 transferred from SICU to floor on 7/17/20 now with signs of tacrolimus toxicity and significant hospital delerium.     Impression:  #S/p liver transplant 7/2: liver enzymes continue to downtrend  OR details:  - L groin cutdown for vv bypass   - anastomosis: bicaval end-end/CBD duct-duct/HA & PV end-end, all standard anatomy.    - IOC with good flow  - Simulect induction. 500mg Solumedrol  - JPs x3 with T-Tube  - 14U pRBC, 14U FFP, 10 PLT, 11 CRYO, 500mL returned from cell saver, EBL 5L, UOP 1100mL  Recipient Info:   ABO: A  CMV:  Neg  EBV Positive  Donor:  Donor ID:  [id number] Match: [id number]  Age: 29 ABO:  A1 High Risk:   No COD: Cardiovascular  Anti CMV positive, EBV IgG- positive  HepBcAb-neg, Hepatitis C-DANA- neg, Hepatitis C ab-neg  # Halluciniations/agitation: worse at night. Getting significant chemical sedations. At times combative. Likely secondary to sleep wake cycle disturbances vs. ICU delerium vs. medication induced. On Seroquel, mirtazepine and melatonin at night. Olanzapine added PRN per Behavioral Health 7/18/20.  # Tremors: Involving bilateral UE - concern for tacrolimus toxicity requiring dose adjustments per Transplant Surgery.   # Sacral decubitus ulcer: does not appear infected  # MISA: serum Cr improved again to 1.7. No episodes of hypotension. UOP remains good. Suspect MISA due to overdiuresis vs. tacrolimus toxicity vs. side effect of meropenem vs transient hypotension  # Elevated liver enzymes: Resolved with mildly elevated ALK-P. Possible secondary to DILI vs. sepsis induced from VRE bacteremia.   # VRE on culture from 7/10: Repeat culture 7/12 negative. On linezolid, s/p meropenem. CT showing groin hematoma, s/p aspiration showing likely seroma and low c/f infection per ID.   #GI bleed: Resolved. Hgb stable with no overt bleeding post-transplant. 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 GAVE and PHG likely improved posttransplant.  # Abnormal Chest X-Ray c/f evolving PNA vs atelectasis. No signs of active infection  # Hypertension: Had episode of hypotension. Now BP normal range.   # Lower ext edema: Improved with Lasix. Diuretics discontinued for MISA.   #R posterior heel wound w/ overlying eschar –Local wound care. no signs of infections, XR neg for gas, evaluated by podiatry and ID. MRI w/o contrast limited, but no overt signs of active infection.  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     Recommendation:  - frequent reorientation and visitation   - normalized sleep/wake cycle environment  - haldol as needed  - continue prednisone 5 mg PO daily  - continue with cyclosporine 200mg BID; adjust by levels  - continue Cellcept (currently on 750 mg BID) - dosing as per transplant surgeryl  - abx per ID team  - continue nystatin, Bactrim and Valcyte ppx  - physical therapy daily to get OOB and ambulating  - continue with mag oxide 200 mg BID  - monitor Hb on daily CBC - continue PO PPI BID, monitor bowel movements  - follow up Wound care recommendations for R heel ulcer and sacral decub  - follow up other Transplant surgery recommendations

## 2020-07-24 NOTE — PROGRESS NOTE ADULT - SUBJECTIVE AND OBJECTIVE BOX
Patient is a 64y old  Male who presents with a chief complaint of Liver failure, hepatic encephalopathy (24 Jul 2020 14:22)       INTERVAL HPI/OVERNIGHT EVENTS:  Patient seen and evaluated at bedside.  Pt is resting comfortable in NAD.     Allergies    codeine (Anaphylaxis)    Intolerances        Vital Signs Last 24 Hrs  T(C): 36.6 (24 Jul 2020 17:00), Max: 36.8 (24 Jul 2020 05:33)  T(F): 97.9 (24 Jul 2020 17:00), Max: 98.3 (24 Jul 2020 05:33)  HR: 91 (24 Jul 2020 17:00) (74 - 100)  BP: 153/74 (24 Jul 2020 17:00) (92/51 - 155/70)  BP(mean): 114 (24 Jul 2020 17:00) (94 - 114)  RR: 18 (24 Jul 2020 17:00) (17 - 18)  SpO2: 100% (24 Jul 2020 17:00) (96% - 100%)    LABS:                        8.0    3.88  )-----------( 82       ( 24 Jul 2020 05:54 )             23.6     07-24    137  |  106  |  39<H>  ----------------------------<  64<L>  5.2   |  23  |  1.49<H>    Ca    9.1      24 Jul 2020 05:54  Phos  3.8     07-24  Mg     1.8     07-24    TPro  5.1<L>  /  Alb  2.9<L>  /  TBili  0.8  /  DBili  0.3<H>  /  AST  16  /  ALT  14  /  AlkPhos  98  07-24    PT/INR - ( 24 Jul 2020 05:54 )   PT: 12.7 sec;   INR: 1.07 ratio         PTT - ( 24 Jul 2020 05:54 )  PTT:29.0 sec    CAPILLARY BLOOD GLUCOSE      POCT Blood Glucose.: 105 mg/dL (24 Jul 2020 17:41)  POCT Blood Glucose.: 117 mg/dL (24 Jul 2020 14:55)  POCT Blood Glucose.: 75 mg/dL (24 Jul 2020 12:11)  POCT Blood Glucose.: 108 mg/dL (24 Jul 2020 08:30)  POCT Blood Glucose.: 76 mg/dL (24 Jul 2020 06:33)  POCT Blood Glucose.: 142 mg/dL (23 Jul 2020 21:40)      Lower Extremity Physical Exam:  Vascular: DP 2/4 PT 1/4 B/L, CFT <3 seconds B/L, Temperature gradient warm to cool B/L  Neuro: Epicritic sensation diminished to the level of heel B/L  Musculoskeletal/Ortho: no gross deformities  Skin:   Wound #1: right foot  Location: posterior heel  Size: 5 x 4 cm  Depth: subQ  Wound bed: granular tissue   Drainage: none  Odor: none  Periwound: no clinical signs of infection  Etiology: pressure, diabetes

## 2020-07-24 NOTE — PROGRESS NOTE ADULT - ASSESSMENT
63 yo M with hx of T2DM uncontrolled due to steroids x 10 years with neuropathy and R DFU of the heel, HTN, HLD, MGUS, and decompensated JEAN cirrhosis s/p liver x-plant on 7/2/2020 admitted 6/11/2020 with confusion secondary to liver failure and encephalopathy. Endocrine consulted for glycemic control in the setting of steroid use now on Prednisone 5mg daily. BG goal (100-180mg/dl). Fasting glucose below goal this AM. Will adjust basal insulin. Pt at high risk for hypoglycemia given fluctuating mental status and PO intake. 65 yo M with hx of T2DM uncontrolled due to steroids x 10 years with neuropathy and R DFU of the heel, HTN, HLD, MGUS, and decompensated JEAN cirrhosis s/p liver x-plant on 7/2/2020 admitted 6/11/2020 with confusion secondary to liver failure and encephalopathy. Endocrine consulted for glycemic control in the setting of steroid use now on Prednisone 5mg daily. BG goal (100-180mg/dl). Fasting glucose below goal this AM. Pt at high risk for hypoglycemia given fluctuating mental status and PO intake.

## 2020-07-24 NOTE — PROGRESS NOTE ADULT - SUBJECTIVE AND OBJECTIVE BOX
Follow Up: post-OLT on 7/2, Blood Cultures (7/10) with VRE    Interval History/ROS:Patient is a 64y old  Male who presents with a chief complaint of Liver failure, hepatic encephalopathy (24 Jul 2020 10:52)  - Pt c/o "pain in my penis and ass", but did not answer on further questioning due to encephalopathy    Allergies  codeine (Anaphylaxis)    OTHER MEDS: MEDICATIONS  (STANDING):  aspirin enteric coated 81 daily  cycloSPORINE  , modified (GENGRAF) 200 <User Schedule>  haloperidol    Injectable 2 at bedtime  haloperidol    Injectable 0.5 every 12 hours PRN  insulin glargine Injectable (LANTUS) 8 at bedtime  insulin lispro (HumaLOG) corrective regimen sliding scale  three times a day before meals  insulin lispro (HumaLOG) corrective regimen sliding scale  at bedtime  insulin lispro Injectable (HumaLOG) 5 three times a day before meals  melatonin 3 at bedtime  mycophenolate mofetil 750 <User Schedule>  pantoprazole    Tablet 40 before breakfast  predniSONE   Tablet 5 daily  tamsulosin 0.8 at bedtime      Vital Signs Last 24 Hrs  T(F): 98.2 (07-24-20 @ 10:38), Max: 98.9 (07-20-20 @ 21:00)    Vital Signs Last 24 Hrs  HR: 74 (07-24-20 @ 10:47) (74 - 99)  BP: 110/58 (07-24-20 @ 10:47) (92/51 - 136/73)  RR: 17 (07-24-20 @ 10:47)  SpO2: 100% (07-24-20 @ 10:47) (96% - 100%)  Wt(kg): --    EXAM:  Constitutional: Not in acute distress  Eyes: pupils bilaterally reactive to light. No icterus.  Oral cavity: Clear, no lesions  Neck: No neck vein distension noted  RS: Chest clear to auscultation bilaterally. No wheeze/rhonchi/crepitations.  CVS: S1, S2 heard. Regular rate and rhythm. No murmurs/rubs/gallops.  Abdomen: Liver transplant suture clean+. Soft. No guarding/rigidity/tenderness. Left groin suture clean+  : No acute abnormalities  Extremities: Warm. No pedal edema  Skin: No lesions noted  Vascular: No evidence of phlebitis  Neuro: Alert, oriented to time/place(does not know hospital name)/person; obviously in encephalopathy    Labs:                        8.0    3.88  )-----------( 82       ( 24 Jul 2020 05:54 )             23.6     07-24    137  |  106  |  39<H>  ----------------------------<  64<L>  5.2   |  23  |  1.49<H>    Ca    9.1      24 Jul 2020 05:54  Phos  3.8     07-24  Mg     1.8     07-24    TPro  5.1<L>  /  Alb  2.9<L>  /  TBili  0.8  /  DBili  0.3<H>  /  AST  16  /  ALT  14  /  AlkPhos  98  07-24    WBC Count: 3.88 (07-24-20 @ 05:54)  WBC Count: 3.79 (07-23-20 @ 05:48)  WBC Count: 3.79 (07-22-20 @ 06:46)  WBC Count: 3.70 (07-21-20 @ 06:00)  WBC Count: 3.66 (07-20-20 @ 05:36)  WBC Count: 4.06 (07-19-20 @ 05:52)  WBC Count: 4.13 (07-18-20 @ 06:12)    Creatinine, Serum: 1.49 (07-24)  Creatinine, Serum: 1.80 (07-23)  Creatinine, Serum: 1.77 (07-22)  Creatinine, Serum: 1.69 (07-21)  Creatinine, Serum: 2.04 (07-20)  Creatinine, Serum: 2.35 (07-19)  Creatinine, Serum: 1.65 (07-19)  Creatinine, Serum: 1.82 (07-19)  Creatinine, Serum: 1.52 (07-18)      MICROBIOLOGY:    Culture - Blood (collected 20 Jul 2020 23:18)  Source: .Blood Blood  Preliminary Report (22 Jul 2020 01:01):    No growth to date.    Culture - Blood (collected 20 Jul 2020 23:18)  Source: .Blood Blood  Preliminary Report (22 Jul 2020 01:01):    No growth to date.    Culture - Urine (collected 20 Jul 2020 15:23)  Source: .Urine Clean Catch (Midstream)  Final Report (21 Jul 2020 13:20):    No growth      RADIOLOGY:  imaging below personally reviewed    OTHER TESTS:  COVID-19 PCR: NotDetec (07-24-20 @ 06:44)  COVID-19 PCR: NotDetec (07-16-20 @ 17:00)    Procalcitonin, Serum: 0.36 (07-24 @ 05:54)  Procalcitonin, Serum: 0.39 (07-23 @ 05:48)  Procalcitonin, Serum: 0.42 (07-22 @ 06:46)  Procalcitonin, Serum: 0.38 (07-21 @ 06:00)  Procalcitonin, Serum: 0.43 (07-20 @ 05:36) Follow Up: post-OLT on 7/2, Blood Cultures (7/10) with VRE    Interval History/ROS:Patient is a 64y old  Male who presents with a chief complaint of Liver failure, hepatic encephalopathy (24 Jul 2020 10:52)  - Very agitated / confused and not providing a coherent history     Allergies  codeine (Anaphylaxis)    OTHER MEDS: MEDICATIONS  (STANDING):  aspirin enteric coated 81 daily  cycloSPORINE  , modified (GENGRAF) 200 <User Schedule>  haloperidol    Injectable 2 at bedtime  haloperidol    Injectable 0.5 every 12 hours PRN  insulin glargine Injectable (LANTUS) 8 at bedtime  insulin lispro (HumaLOG) corrective regimen sliding scale  three times a day before meals  insulin lispro (HumaLOG) corrective regimen sliding scale  at bedtime  insulin lispro Injectable (HumaLOG) 5 three times a day before meals  melatonin 3 at bedtime  mycophenolate mofetil 750 <User Schedule>  pantoprazole    Tablet 40 before breakfast  predniSONE   Tablet 5 daily  tamsulosin 0.8 at bedtime      Vital Signs Last 24 Hrs  T(F): 98.2 (07-24-20 @ 10:38), Max: 98.9 (07-20-20 @ 21:00)    Vital Signs Last 24 Hrs  HR: 74 (07-24-20 @ 10:47) (74 - 99)  BP: 110/58 (07-24-20 @ 10:47) (92/51 - 136/73)  RR: 17 (07-24-20 @ 10:47)  SpO2: 100% (07-24-20 @ 10:47) (96% - 100%)  Wt(kg): --    EXAM:  Constitutional: Not in acute distress  Eyes: pupils bilaterally reactive to light. No icterus.  Oral cavity: Clear, no lesions  Neck: No neck vein distension noted  RS: Chest clear to auscultation bilaterally. No wheeze/rhonchi/crepitations.  CVS: S1, S2 heard. Regular rate and rhythm. No murmurs/rubs/gallops.  Abdomen: Liver transplant suture clean+. Soft. No guarding/rigidity/tenderness. Left groin suture clean+  : No acute abnormalities  Extremities: Warm. No pedal edema  Skin: No lesions noted  Vascular: No evidence of phlebitis  Neuro: Alert, oriented to time/place(does not know hospital name)/person; obviously in encephalopathy    Labs:                        8.0    3.88  )-----------( 82       ( 24 Jul 2020 05:54 )             23.6     07-24    137  |  106  |  39<H>  ----------------------------<  64<L>  5.2   |  23  |  1.49<H>    Ca    9.1      24 Jul 2020 05:54  Phos  3.8     07-24  Mg     1.8     07-24    TPro  5.1<L>  /  Alb  2.9<L>  /  TBili  0.8  /  DBili  0.3<H>  /  AST  16  /  ALT  14  /  AlkPhos  98  07-24    WBC Count: 3.88 (07-24-20 @ 05:54)  WBC Count: 3.79 (07-23-20 @ 05:48)  WBC Count: 3.79 (07-22-20 @ 06:46)  WBC Count: 3.70 (07-21-20 @ 06:00)  WBC Count: 3.66 (07-20-20 @ 05:36)  WBC Count: 4.06 (07-19-20 @ 05:52)  WBC Count: 4.13 (07-18-20 @ 06:12)    Creatinine, Serum: 1.49 (07-24)  Creatinine, Serum: 1.80 (07-23)  Creatinine, Serum: 1.77 (07-22)  Creatinine, Serum: 1.69 (07-21)  Creatinine, Serum: 2.04 (07-20)  Creatinine, Serum: 2.35 (07-19)  Creatinine, Serum: 1.65 (07-19)  Creatinine, Serum: 1.82 (07-19)  Creatinine, Serum: 1.52 (07-18)      MICROBIOLOGY:    Culture - Blood (collected 20 Jul 2020 23:18)  Source: .Blood Blood  Preliminary Report (22 Jul 2020 01:01):    No growth to date.    Culture - Blood (collected 20 Jul 2020 23:18)  Source: .Blood Blood  Preliminary Report (22 Jul 2020 01:01):    No growth to date.    Culture - Urine (collected 20 Jul 2020 15:23)  Source: .Urine Clean Catch (Midstream)  Final Report (21 Jul 2020 13:20):    No growth      RADIOLOGY:    <The imaging below has been reviewed and visualized by me independently. Findings as detailed in report below>    EXAM:  XR CHEST PORTABLE URGENT 1V                        PROCEDURE DATE:  07/23/2020    Study is limited by low lung volumes and by patient positioning.  Difficult to assess heart size.  Thoracic aortic atheromatous changes and ectasia are stable.  No bilateral focal consolidations.  No large pleural effusion. No pneumothorax.  No acute bony finding.    OTHER TESTS:  COVID-19 PCR: NotDetec (07-24-20 @ 06:44)  COVID-19 PCR: NotDetec (07-16-20 @ 17:00)    Procalcitonin, Serum: 0.36 (07-24 @ 05:54)  Procalcitonin, Serum: 0.39 (07-23 @ 05:48)  Procalcitonin, Serum: 0.42 (07-22 @ 06:46)  Procalcitonin, Serum: 0.38 (07-21 @ 06:00)  Procalcitonin, Serum: 0.43 (07-20 @ 05:36)

## 2020-07-24 NOTE — PROGRESS NOTE ADULT - SUBJECTIVE AND OBJECTIVE BOX
Chief Complaint:     History:    MEDICATIONS  (STANDING):  aspirin enteric coated 81 milliGRAM(s) Oral daily  chlorhexidine 2% Cloths 1 Application(s) Topical <User Schedule>  collagenase Ointment 1 Application(s) Topical daily  cycloSPORINE  , modified (GENGRAF) 200 milliGRAM(s) Oral <User Schedule>  DAPTOmycin IVPB 800 milliGRAM(s) IV Intermittent every 24 hours  haloperidol    Injectable 2 milliGRAM(s) IV Push at bedtime  insulin glargine Injectable (LANTUS) 8 Unit(s) SubCutaneous at bedtime  insulin lispro (HumaLOG) corrective regimen sliding scale   SubCutaneous three times a day before meals  insulin lispro (HumaLOG) corrective regimen sliding scale   SubCutaneous at bedtime  insulin lispro Injectable (HumaLOG) 5 Unit(s) SubCutaneous three times a day before meals  magnesium oxide 800 milliGRAM(s) Oral two times a day with meals  melatonin 3 milliGRAM(s) Oral at bedtime  mycophenolate mofetil 750 milliGRAM(s) Oral <User Schedule>  nystatin    Suspension 394283 Unit(s) Oral four times a day  nystatin Cream 1 Application(s) Topical two times a day  pantoprazole    Tablet 40 milliGRAM(s) Oral before breakfast  predniSONE   Tablet 5 milliGRAM(s) Oral daily  tamsulosin 0.8 milliGRAM(s) Oral at bedtime  trimethoprim   80 mG/sulfamethoxazole 400 mG 1 Tablet(s) Oral daily  valGANciclovir 900 milliGRAM(s) Oral daily    MEDICATIONS  (PRN):  haloperidol    Injectable 0.5 milliGRAM(s) IV Push every 12 hours PRN Agitation      Allergies    codeine (Anaphylaxis)    Intolerances      Review of Systems:  Constitutional: No fever  Eyes: No blurry vision  Neuro: No tremors  HEENT: No pain  Cardiovascular: No chest pain, palpitations  Respiratory: No SOB, no cough  GI: No nausea, vomiting, abdominal pain  : No dysuria  Skin: no rash  Psych: no depression  Endocrine: no polyuria, polydipsia  Hem/lymph: no swelling  Osteoporosis: no fractures    ALL OTHER SYSTEMS REVIEWED AND NEGATIVE    UNABLE TO OBTAIN    PHYSICAL EXAM:  VITALS: T(C): 36.8 (07-24-20 @ 05:33)  T(F): 98.3 (07-24-20 @ 05:33), Max: 98.4 (07-23-20 @ 13:00)  HR: 99 (07-24-20 @ 05:33) (79 - 99)  BP: 110/58 (07-24-20 @ 05:33) (110/58 - 136/73)  RR:  (18 - 18)  SpO2:  (96% - 100%)  Wt(kg): --  GENERAL: NAD, well-groomed, well-developed  EYES: No proptosis, no lid lag, anicteric  HEENT:  Atraumatic, Normocephalic, moist mucous membranes  THYROID: Normal size, no palpable nodules  RESPIRATORY: Clear to auscultation bilaterally; No rales, rhonchi, wheezing, or rubs  CARDIOVASCULAR: Regular rate and rhythm; No murmurs; no peripheral edema  GI: Soft, nontender, non distended, normal bowel sounds  SKIN: Dry, intact, No rashes or lesions  MUSCULOSKELETAL: Full range of motion, normal strength  NEURO: sensation intact, extraocular movements intact, no tremor, normal reflexes  PSYCH: Alert and oriented x 3, normal affect, normal mood  CUSHING'S SIGNS: no striae    POCT Blood Glucose.: 108 mg/dL (07-24-20 @ 08:30)  POCT Blood Glucose.: 76 mg/dL (07-24-20 @ 06:33)  POCT Blood Glucose.: 142 mg/dL (07-23-20 @ 21:40)  POCT Blood Glucose.: 139 mg/dL (07-23-20 @ 16:52)  POCT Blood Glucose.: 130 mg/dL (07-23-20 @ 12:28)  POCT Blood Glucose.: 87 mg/dL (07-23-20 @ 09:04)  POCT Blood Glucose.: 129 mg/dL (07-22-20 @ 21:42)  POCT Blood Glucose.: 217 mg/dL (07-22-20 @ 17:15)  POCT Blood Glucose.: 245 mg/dL (07-22-20 @ 11:38)  POCT Blood Glucose.: 147 mg/dL (07-22-20 @ 08:04)  POCT Blood Glucose.: 187 mg/dL (07-21-20 @ 23:21)  POCT Blood Glucose.: 170 mg/dL (07-21-20 @ 21:28)  POCT Blood Glucose.: 161 mg/dL (07-21-20 @ 17:00)  POCT Blood Glucose.: 149 mg/dL (07-21-20 @ 12:22)      07-24    137  |  106  |  39<H>  ----------------------------<  64<L>  5.2   |  23  |  1.49<H>    EGFR if : 57<L>  EGFR if non : 49<L>    Ca    9.1      07-24  Mg     1.8     07-24  Phos  3.8     07-24    TPro  5.1<L>  /  Alb  2.9<L>  /  TBili  0.8  /  DBili  0.3<H>  /  AST  16  /  ALT  14  /  AlkPhos  98  07-24          Thyroid Function Tests: Chief Complaint: T2DM    History: Patient had hypoglycemia this AM. Continues to have minimal po intake due to AMS.     MEDICATIONS  (STANDING):  aspirin enteric coated 81 milliGRAM(s) Oral daily  chlorhexidine 2% Cloths 1 Application(s) Topical <User Schedule>  collagenase Ointment 1 Application(s) Topical daily  cycloSPORINE  , modified (GENGRAF) 200 milliGRAM(s) Oral <User Schedule>  DAPTOmycin IVPB 800 milliGRAM(s) IV Intermittent every 24 hours  haloperidol    Injectable 2 milliGRAM(s) IV Push at bedtime  insulin glargine Injectable (LANTUS) 8 Unit(s) SubCutaneous at bedtime  insulin lispro (HumaLOG) corrective regimen sliding scale   SubCutaneous three times a day before meals  insulin lispro (HumaLOG) corrective regimen sliding scale   SubCutaneous at bedtime  insulin lispro Injectable (HumaLOG) 5 Unit(s) SubCutaneous three times a day before meals  magnesium oxide 800 milliGRAM(s) Oral two times a day with meals  melatonin 3 milliGRAM(s) Oral at bedtime  mycophenolate mofetil 750 milliGRAM(s) Oral <User Schedule>  nystatin    Suspension 872573 Unit(s) Oral four times a day  nystatin Cream 1 Application(s) Topical two times a day  pantoprazole    Tablet 40 milliGRAM(s) Oral before breakfast  predniSONE   Tablet 5 milliGRAM(s) Oral daily  tamsulosin 0.8 milliGRAM(s) Oral at bedtime  trimethoprim   80 mG/sulfamethoxazole 400 mG 1 Tablet(s) Oral daily  valGANciclovir 900 milliGRAM(s) Oral daily    MEDICATIONS  (PRN):  haloperidol    Injectable 0.5 milliGRAM(s) IV Push every 12 hours PRN Agitation      Allergies    codeine (Anaphylaxis)    Intolerances      Review of Systems:    UNABLE TO OBTAIN    PHYSICAL EXAM:  VITALS: T(C): 36.8 (07-24-20 @ 05:33)  T(F): 98.3 (07-24-20 @ 05:33), Max: 98.4 (07-23-20 @ 13:00)  HR: 99 (07-24-20 @ 05:33) (79 - 99)  BP: 110/58 (07-24-20 @ 05:33) (110/58 - 136/73)  RR:  (18 - 18)  SpO2:  (96% - 100%)  Wt(kg): --  GENERAL: NAD, well-groomed, well-developed  EYES: No proptosis, no lid lag, anicteric  HEENT:  Atraumatic, Normocephalic, moist mucous membranes  RESPIRATORY: Clear to auscultation bilaterally; No rales, rhonchi, wheezing, or rubs  CARDIOVASCULAR: Regular rate and rhythm  GI: Soft, nontender      POCT Blood Glucose.: 108 mg/dL (07-24-20 @ 08:30)  POCT Blood Glucose.: 76 mg/dL (07-24-20 @ 06:33)  POCT Blood Glucose.: 142 mg/dL (07-23-20 @ 21:40)  POCT Blood Glucose.: 139 mg/dL (07-23-20 @ 16:52)  POCT Blood Glucose.: 130 mg/dL (07-23-20 @ 12:28)  POCT Blood Glucose.: 87 mg/dL (07-23-20 @ 09:04)  POCT Blood Glucose.: 129 mg/dL (07-22-20 @ 21:42)  POCT Blood Glucose.: 217 mg/dL (07-22-20 @ 17:15)  POCT Blood Glucose.: 245 mg/dL (07-22-20 @ 11:38)  POCT Blood Glucose.: 147 mg/dL (07-22-20 @ 08:04)  POCT Blood Glucose.: 187 mg/dL (07-21-20 @ 23:21)  POCT Blood Glucose.: 170 mg/dL (07-21-20 @ 21:28)  POCT Blood Glucose.: 161 mg/dL (07-21-20 @ 17:00)  POCT Blood Glucose.: 149 mg/dL (07-21-20 @ 12:22)      07-24    137  |  106  |  39<H>  ----------------------------<  64<L>  5.2   |  23  |  1.49<H>    EGFR if : 57<L>  EGFR if non : 49<L>    Ca    9.1      07-24  Mg     1.8     07-24  Phos  3.8     07-24    TPro  5.1<L>  /  Alb  2.9<L>  /  TBili  0.8  /  DBili  0.3<H>  /  AST  16  /  ALT  14  /  AlkPhos  98  07-24

## 2020-07-24 NOTE — PROGRESS NOTE ADULT - SUBJECTIVE AND OBJECTIVE BOX
Chief Complaint:  Patient is a 64y old  Male who presents with a chief complaint of Liver failure, hepatic encephalopathy (2020 11:41)      Interval Events: Continues to be confused. Required haldol. Agitated and combative at times. Not able to sleep at night.     Allergies:  codeine (Anaphylaxis)      Hospital Medications:  aspirin enteric coated 81 milliGRAM(s) Oral daily  chlorhexidine 2% Cloths 1 Application(s) Topical <User Schedule>  collagenase Ointment 1 Application(s) Topical daily  cycloSPORINE  , modified (GENGRAF) 200 milliGRAM(s) Oral <User Schedule>  DAPTOmycin IVPB 800 milliGRAM(s) IV Intermittent every 24 hours  haloperidol    Injectable 2 milliGRAM(s) IV Push at bedtime  haloperidol    Injectable 0.5 milliGRAM(s) IV Push every 12 hours PRN  insulin glargine Injectable (LANTUS) 4 Unit(s) SubCutaneous at bedtime  insulin lispro (HumaLOG) corrective regimen sliding scale   SubCutaneous three times a day before meals  insulin lispro (HumaLOG) corrective regimen sliding scale   SubCutaneous at bedtime  magnesium oxide 800 milliGRAM(s) Oral two times a day with meals  mycophenolate mofetil 750 milliGRAM(s) Oral <User Schedule>  nystatin    Suspension 005681 Unit(s) Oral four times a day  nystatin Cream 1 Application(s) Topical two times a day  pantoprazole    Tablet 40 milliGRAM(s) Oral before breakfast  predniSONE   Tablet 5 milliGRAM(s) Oral daily  tamsulosin 0.8 milliGRAM(s) Oral at bedtime  trimethoprim   80 mG/sulfamethoxazole 400 mG 1 Tablet(s) Oral daily  valGANciclovir 900 milliGRAM(s) Oral daily      PMHX/PSHX:  GIB (gastrointestinal bleeding)  GERD with esophagitis  Hepatic encephalopathy  Obesity  Fatty liver disease, nonalcoholic  Renal stones  Hypertension  Neuropathy  Hypercholesteremia  Diabetes  S/P cholecystectomy  No significant past surgical history      Family history:  Family history of type 2 diabetes mellitus  Family history of hypertension  Family history of stomach cancer  No pertinent family history in first degree relatives      ROS: As per HPI, 14-point ROS negative otherwise.    PHYSICAL EXAM:     Vital Signs:  Vital Signs Last 24 Hrs  T(C): 36.8 (2020 10:38), Max: 36.8 (2020 05:33)  T(F): 98.2 (2020 10:38), Max: 98.3 (2020 05:33)  HR: 100 (2020 11:30) (74 - 100)  BP: 136/69 (2020 11:30) (92/51 - 136/73)  BP(mean): 94 (2020 21:00) (94 - 94)  RR: 18 (2020 11:30) (17 - 18)  SpO2: 99% (2020 11:30) (96% - 100%)  Daily     Daily Weight in k.8 (2020 05:33)    GENERAL:  appears comfortable, no acute distress, confused  HEENT:  NC/AT,  conjunctivae clear, sclera -anicteric  CHEST:  no increased effort  HEART:  Regular rate and rhythm  ABDOMEN:  Soft, non-tender, non-distended,  no masses, surgical scar noted with minimal tenderness and staples removed  EXTREMITIES:  trace edema  SKIN:   sacral decubitus ulcer noted  NEURO:  Alert, orientedx2, tremors    LABS:                        8.0    3.88  )-----------( 82       ( 2020 05:54 )             23.6     07-24    137  |  106  |  39<H>  ----------------------------<  64<L>  5.2   |  23  |  1.49<H>    Ca    9.1      2020 05:54  Phos  3.8     07-24  Mg     1.8     07-24    TPro  5.1<L>  /  Alb  2.9<L>  /  TBili  0.8  /  DBili  0.3<H>  /  AST  16  /  ALT  14  /  AlkPhos  98  07-24    LIVER FUNCTIONS - ( 2020 05:54 )  Alb: 2.9 g/dL / Pro: 5.1 g/dL / ALK PHOS: 98 U/L / ALT: 14 U/L / AST: 16 U/L / GGT: x           PT/INR - ( 2020 05:54 )   PT: 12.7 sec;   INR: 1.07 ratio         PTT - ( 2020 05:54 )  PTT:29.0 sec        Imaging:

## 2020-07-24 NOTE — PROGRESS NOTE ADULT - ATTENDING COMMENTS
still confused/agitated.  poor sleeping.   will inc haldol.   good graft function  immuno: cya/cellcept/pred

## 2020-07-24 NOTE — PROGRESS NOTE ADULT - ATTENDING COMMENTS
64M PMH JEAN cirrhosis s/p OLT on 7/2/20  Post-Op Course Complicated by Encephalopathy and Tremors  Blood Cultures (7/10) with VRE  CT C/A/P (7/11) without obvious intraabdominal source.  Hypoxia prior to positive BCx which may be related to sepsis   Had L groin fluid collection which appears to be consistent with seroma - sample obtained for culture  Would continue Linezolid for 2 weeks from negative BCx  TTE limited but without obvious vegetations    Continued encephalopathy and tremor (?Tacrolimus-induced - switched to Cyclosporine)  U/A from 7/20 without pyuria  Blood cultures sent and pending - NGTD    Continue Daptomycin to complete bacteremia course (End Date: 7/26)  Would recommend CNS imaging given persistent encephalopathy   If encephalopathy does not improve and CNS imaging unrevealing would pursue LP    I will be away over this upcoming weekend. Please contact the Infectious Diseases Office with any further questions or concerns.     Eusebio Pérez M.D.  [facility] Division of Infectious Disease  8AM-5PM: Pager Number [phone]  After Hours (or if no response): Please contact the Infectious Diseases Office at [phone]     The above assessment and plan were discussed with Jessenia Avery - Transplant Surgery NP

## 2020-07-24 NOTE — PROGRESS NOTE ADULT - ASSESSMENT
ASSESSMENT/RECOMMENDATIONS:  63 y/o M PMHx decompensated JEAN Cirrhosis on transplant list, DMII, HFpEF, recent admission for ESBL E coli prostatic abscess 2/2 4 wks ertapenem, hx of ESBL UTIs with prostate abscess s/p IV abx, recent VRE urinary colonization came to hospital for worsening jaundice and episode of confusion, resolved PTA. s/p treatment course for possible VRE UTI. Now s/p OLT on 7/2/20. Course complicated by Encephalopathy and Tremors. Blood Cultures (7/10) with VRE sensitive to Linezolid. CT C/A/P without obvious intraabdominal source. Hypoxia prior to positive BCx which may be related to sepsis CT Chest (7/11) without evidence of pneumonia. Had L groin fluid collection which appears to be consistent with seroma - sample obtained for culture also negative. No drainage, erythema or underlying fluctuance was appreciated at the right heel. MRI prior to transplant without obvious infection. Transferred from [facility] to [facility] on 7/17/2020.    Currently on:  Daptomycin 800mg q24h  nystatin    Suspension 105146 four times a day  trimethoprim   80 mG/sulfamethoxazole 400 mG 1 daily  valGANciclovir 900 daily    #VRE Bacteremia, L Groin Collection, Encephalopathy  - DC Linezolid, switch to Daptomycin 8mg/kg q24h for bacteremia (800mg q24h) to avoid cytopenia and risk of serotonin syndrome (given coinciding haldol), will need to monitor weekly CK level, (2 weeks from - BCx, End Date: 7/26)  - Abd US showed 9cm Left groin collection: likely represents a seroma or lymphocele; aspirated - cx: PMN seen, no organism seen  - Continue monitoring cylosporine levels  - TTE negative for endocarditis  - UA/UC on 7/20 no UTI  - BCx on 7/20 NGTD  - Would recommend CNS imaging given persistent encephalopathy   - If encephalopathy does not improve and CNS imaging unrevealing would pursue LP    #Hypoxic Respiratory Failure, Encephalopathy  - No evidence of pneumonia and collection in left groin appears consistent with seroma  - now off the Meropenem    #s/p OLT   - Continue bactrim, valcyte (CMV D+/R-) for prophylaxis  - Monitor patient for tremors - now on cylosporine    #Right heel wound  --Podiatry on board    --Local wound care     John-Ramez Mayer, MD, PGY4  Fellow, Infectious Diseases  Pager: [phone]  If no response, after 5pm and on Weekends: Call [phone]      ASSESSMENT:    RECOMMENDATIONS:    #PENDING RECS. PLEASE WAIT FOR FINAL RECS AFTER DISCUSSION WITH ATTENDING#  Suyapa Mayer MD, PGY4  Fellow, Infectious Diseases  Pager: [phone]  If no response, after 5pm and on Weekends: Call [phone] ASSESSMENT/RECOMMENDATIONS:  65 y/o M PMHx decompensated JEAN Cirrhosis on transplant list, DMII, HFpEF, recent admission for ESBL E coli prostatic abscess 2/2 4 wks ertapenem, hx of ESBL UTIs with prostate abscess s/p IV abx, recent VRE urinary colonization came to hospital for worsening jaundice and episode of confusion, resolved PTA. s/p treatment course for possible VRE UTI. Now s/p OLT on 7/2/20. Course complicated by Encephalopathy and Tremors. Blood Cultures (7/10) with VRE sensitive to Linezolid. CT C/A/P without obvious intraabdominal source. Hypoxia prior to positive BCx which may be related to sepsis CT Chest (7/11) without evidence of pneumonia. Had L groin fluid collection which appears to be consistent with seroma - sample obtained for culture also negative. No drainage, erythema or underlying fluctuance was appreciated at the right heel. MRI prior to transplant without obvious infection. Transferred from [facility] to [facility] on 7/17/2020.    Currently on:  Daptomycin 800mg q24h  nystatin    Suspension 100951 four times a day  trimethoprim   80 mG/sulfamethoxazole 400 mG 1 daily  valGANciclovir 900 daily    #VRE Bacteremia, L Groin Collection, Encephalopathy  - DC Linezolid, switch to Daptomycin 8mg/kg q24h for bacteremia (800mg q24h) to avoid cytopenia and risk of serotonin syndrome (given coinciding haldol), will need to monitor weekly CK level, (2 weeks from - BCx, End Date: 7/26)  - Abd US showed 9cm Left groin collection: likely represents a seroma or lymphocele; aspirated - cx: PMN seen, no organism seen  - Continue monitoring cylosporine levels  - TTE negative for endocarditis  - UA/UC on 7/20 no UTI  - BCx on 7/20 NGTD  - Doubt if he has true UTI or prostate infection and his UC on 7/20 also no growth, can check UA/UC if he has persistent urinary symptoms  - Would recommend CNS imaging given persistent encephalopathy   - If encephalopathy does not improve and CNS imaging unrevealing would pursue LP    #Hypoxic Respiratory Failure, Encephalopathy  - No evidence of pneumonia and collection in left groin appears consistent with seroma  - now off the Meropenem    #s/p OLT   - Continue bactrim, valcyte (CMV D+/R-) for prophylaxis  - Monitor patient for tremors - now on cylosporine    #Right heel wound  --Podiatry on board    --Local wound care     Suyapa Mayer MD, PGY4  Fellow, Infectious Diseases  Pager: [phone]  If no response, after 5pm and on Weekends: Call [phone]    d/w Dr. Pérez ASSESSMENT/RECOMMENDATIONS:    65 y/o M PMHx decompensated JEAN Cirrhosis on transplant list, DMII, HFpEF, recent admission for ESBL E coli prostatic abscess 2/2 4 wks ertapenem, hx of ESBL UTIs with prostate abscess s/p IV abx, recent VRE urinary colonization came to hospital for worsening jaundice and episode of confusion, resolved PTA. s/p treatment course for possible VRE UTI. Now s/p OLT on 7/2/20. Course complicated by Encephalopathy and Tremors. Blood Cultures (7/10) with VRE sensitive to Linezolid. CT C/A/P without obvious intraabdominal source. Hypoxia prior to positive BCx which may be related to sepsis CT Chest (7/11) without evidence of pneumonia. Had L groin fluid collection which appears to be consistent with seroma - sample obtained for culture also negative. No drainage, erythema or underlying fluctuance was appreciated at the right heel. MRI prior to transplant without obvious infection. Transferred from [facility] to [facility] on 7/17/2020.    Currently on:  Daptomycin 800mg q24h  nystatin    Suspension [id number] four times a day  trimethoprim   80 mG/sulfamethoxazole 400 mG 1 daily  valGANciclovir 900 daily    #VRE Bacteremia, L Groin Collection, Encephalopathy  - DC Linezolid, switch to Daptomycin 8mg/kg q24h for bacteremia (800mg q24h) to avoid cytopenia and risk of serotonin syndrome (given coinciding haldol), will need to monitor weekly CK level, (2 weeks from - BCx, End Date: 7/26)  - Abd US showed 9cm Left groin collection: likely represents a seroma or lymphocele; aspirated - cx: PMN seen, no organism seen  - Continue monitoring cylosporine levels  - TTE negative for endocarditis  - UA/UC on 7/20 no UTI  - BCx on 7/20 NGTD  - Doubt if he has true UTI or prostate infection and his UC on 7/20 also no growth  - Would recommend CNS imaging given persistent encephalopathy   - If encephalopathy does not improve and CNS imaging unrevealing would pursue LP    #Hypoxic Respiratory Failure, Encephalopathy  - No evidence of pneumonia and collection in left groin appears consistent with seroma  - now off the Meropenem    #s/p OLT   - Continue bactrim, valcyte (CMV D+/R-) for prophylaxis  - Monitor patient for tremors - now on cylosporine    #Right heel wound  --Podiatry on board    --Local wound care     Suyapa Mayer MD, PGY4  Fellow, Infectious Diseases  Pager: [phone]  If no response, after 5pm and on Weekends: Call [phone]    d/w Dr. Pérez

## 2020-07-24 NOTE — PROGRESS NOTE ADULT - ATTENDING COMMENTS
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 If delirium and signs of neurotoxicity persist, then it may be necessary to switch him to an mTOR inhibitor.    Please don't hesitate to call with any questions or concerns.    Letitia Mo M.D., Ph.D.  Transplant Hepatology  Cell: [phone]

## 2020-07-24 NOTE — PROGRESS NOTE ADULT - ATTENDING COMMENTS
Kala Rios (pager [phone])  On evenings and weekends, please call [phone] or page endocrine fellow on call.   Please note that this patient may be followed by different provider tomorrow. If no answer, contact endocrine fellow on call.

## 2020-07-24 NOTE — PROGRESS NOTE ADULT - ASSESSMENT
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    [] POD 22 s/p OLT   - Good graft function, LFTs stable   - Immuno: off tacro as of 7/19. Cyclo 200mg bid (will adjust by level- level y/d 152), MMF 750mg bid; Pred 5mg daily  - PPX: valcyte/nystatin/bactrim  - Continue ASA 81mg daily  - h/o UGI bleed: Protonix 40mg daily   - Pain control PRN  - will give haldol PRN for agitation, increased standing dose to 2mg    - DVT PPx: SCDs at all times  - Sacral decub: seen by  wound care this am, recs appreciated     [] VRE bacteremia (7/10)  - ID following: on dapto  - Bld cx from 7/20 with NG, urine cx (7/20) with NG     [] Hyperglycemia  - Appreciate endocrinology recommendations  - Decr Lantus 8u and Humalog insulin wih SSI coverage  - Fingersticks ac and qhs    [] Heel ulcer  - Continue local wound care w/ santyl and DSD as per podiatry    [] Dispo:   - continue 1:1 with tele  - F/U PT recs for possible Discharge to acute vs [facility]  - SW with tx team working with pt & family on DC plan

## 2020-07-24 NOTE — PROGRESS NOTE ADULT - ASSESSMENT
64M w/ R posterior heel wound, stable  -Pt seen and evaluated  -R posterior heel wound to subQ stable w/ no signs of infection  -XR negative for gas or OM  -No podiatric surgical intervention, will continue to monitor for signs of infection and wound care recommendations  --continue with medihoney and dsd daily and decubitus precautions  - follow up as outpatient upon discharge (call [phone] for appointment)

## 2020-07-25 LAB
ALBUMIN SERPL ELPH-MCNC: 3.1 G/DL — LOW (ref 3.3–5)
ALP SERPL-CCNC: 107 U/L — SIGNIFICANT CHANGE UP (ref 40–120)
ALT FLD-CCNC: 17 U/L — SIGNIFICANT CHANGE UP (ref 10–45)
ANION GAP SERPL CALC-SCNC: 10 MMOL/L — SIGNIFICANT CHANGE UP (ref 5–17)
APTT BLD: 28.9 SEC — SIGNIFICANT CHANGE UP (ref 27.5–35.5)
AST SERPL-CCNC: 14 U/L — SIGNIFICANT CHANGE UP (ref 10–40)
BILIRUB DIRECT SERPL-MCNC: 0.3 MG/DL — HIGH (ref 0–0.2)
BILIRUB INDIRECT FLD-MCNC: 0.6 MG/DL — SIGNIFICANT CHANGE UP (ref 0.2–1)
BILIRUB SERPL-MCNC: 0.9 MG/DL — SIGNIFICANT CHANGE UP (ref 0.2–1.2)
BUN SERPL-MCNC: 40 MG/DL — HIGH (ref 7–23)
CALCIUM SERPL-MCNC: 8.9 MG/DL — SIGNIFICANT CHANGE UP (ref 8.4–10.5)
CHLORIDE SERPL-SCNC: 102 MMOL/L — SIGNIFICANT CHANGE UP (ref 96–108)
CO2 SERPL-SCNC: 23 MMOL/L — SIGNIFICANT CHANGE UP (ref 22–31)
CREAT SERPL-MCNC: 1.52 MG/DL — HIGH (ref 0.5–1.3)
CYCLOSPORINE SER-MCNC: 165 NG/ML — SIGNIFICANT CHANGE UP (ref 150–400)
GLUCOSE BLDC GLUCOMTR-MCNC: 103 MG/DL — HIGH (ref 70–99)
GLUCOSE BLDC GLUCOMTR-MCNC: 112 MG/DL — HIGH (ref 70–99)
GLUCOSE BLDC GLUCOMTR-MCNC: 166 MG/DL — HIGH (ref 70–99)
GLUCOSE BLDC GLUCOMTR-MCNC: 88 MG/DL — SIGNIFICANT CHANGE UP (ref 70–99)
GLUCOSE SERPL-MCNC: 113 MG/DL — HIGH (ref 70–99)
HCT VFR BLD CALC: 23.4 % — LOW (ref 39–50)
HGB BLD-MCNC: 7.8 G/DL — LOW (ref 13–17)
INR BLD: 1.1 RATIO — SIGNIFICANT CHANGE UP (ref 0.88–1.16)
MAGNESIUM SERPL-MCNC: 1.9 MG/DL — SIGNIFICANT CHANGE UP (ref 1.6–2.6)
MCHC RBC-ENTMCNC: 32.1 PG — SIGNIFICANT CHANGE UP (ref 27–34)
MCHC RBC-ENTMCNC: 33.3 GM/DL — SIGNIFICANT CHANGE UP (ref 32–36)
MCV RBC AUTO: 96.3 FL — SIGNIFICANT CHANGE UP (ref 80–100)
NRBC # BLD: 0 /100 WBCS — SIGNIFICANT CHANGE UP (ref 0–0)
PHOSPHATE SERPL-MCNC: 3.6 MG/DL — SIGNIFICANT CHANGE UP (ref 2.5–4.5)
PLATELET # BLD AUTO: 81 K/UL — LOW (ref 150–400)
POTASSIUM SERPL-MCNC: 5.2 MMOL/L — SIGNIFICANT CHANGE UP (ref 3.5–5.3)
POTASSIUM SERPL-SCNC: 5.2 MMOL/L — SIGNIFICANT CHANGE UP (ref 3.5–5.3)
PROCALCITONIN SERPL-MCNC: 0.34 NG/ML — HIGH (ref 0.02–0.1)
PROT SERPL-MCNC: 5.4 G/DL — LOW (ref 6–8.3)
PROTHROM AB SERPL-ACNC: 13 SEC — SIGNIFICANT CHANGE UP (ref 10.6–13.6)
RBC # BLD: 2.43 M/UL — LOW (ref 4.2–5.8)
RBC # FLD: 18.1 % — HIGH (ref 10.3–14.5)
SODIUM SERPL-SCNC: 135 MMOL/L — SIGNIFICANT CHANGE UP (ref 135–145)
WBC # BLD: 3.65 K/UL — LOW (ref 3.8–10.5)
WBC # FLD AUTO: 3.65 K/UL — LOW (ref 3.8–10.5)

## 2020-07-25 PROCEDURE — 99232 SBSQ HOSP IP/OBS MODERATE 35: CPT | Mod: GC,24

## 2020-07-25 PROCEDURE — 99232 SBSQ HOSP IP/OBS MODERATE 35: CPT | Mod: GC

## 2020-07-25 RX ORDER — HALOPERIDOL DECANOATE 100 MG/ML
2 INJECTION INTRAMUSCULAR ONCE
Refills: 0 | Status: COMPLETED | OUTPATIENT
Start: 2020-07-25 | End: 2020-07-25

## 2020-07-25 RX ORDER — HALOPERIDOL DECANOATE 100 MG/ML
1 INJECTION INTRAMUSCULAR ONCE
Refills: 0 | Status: COMPLETED | OUTPATIENT
Start: 2020-07-25 | End: 2020-07-25

## 2020-07-25 RX ORDER — TRAZODONE HCL 50 MG
50 TABLET ORAL AT BEDTIME
Refills: 0 | Status: DISCONTINUED | OUTPATIENT
Start: 2020-07-25 | End: 2020-07-26

## 2020-07-25 RX ORDER — MAGNESIUM SULFATE 500 MG/ML
2 VIAL (ML) INJECTION ONCE
Refills: 0 | Status: COMPLETED | OUTPATIENT
Start: 2020-07-25 | End: 2020-07-25

## 2020-07-25 RX ADMIN — Medication 1 TABLET(S): at 12:34

## 2020-07-25 RX ADMIN — NYSTATIN CREAM 1 APPLICATION(S): 100000 CREAM TOPICAL at 17:27

## 2020-07-25 RX ADMIN — MYCOPHENOLATE MOFETIL 750 MILLIGRAM(S): 250 CAPSULE ORAL at 20:37

## 2020-07-25 RX ADMIN — NYSTATIN CREAM 1 APPLICATION(S): 100000 CREAM TOPICAL at 05:16

## 2020-07-25 RX ADMIN — CYCLOSPORINE 200 MILLIGRAM(S): 100 CAPSULE ORAL at 20:38

## 2020-07-25 RX ADMIN — CHLORHEXIDINE GLUCONATE 1 APPLICATION(S): 213 SOLUTION TOPICAL at 11:59

## 2020-07-25 RX ADMIN — Medication 500000 UNIT(S): at 17:26

## 2020-07-25 RX ADMIN — MYCOPHENOLATE MOFETIL 750 MILLIGRAM(S): 250 CAPSULE ORAL at 08:48

## 2020-07-25 RX ADMIN — MAGNESIUM OXIDE 400 MG ORAL TABLET 800 MILLIGRAM(S): 241.3 TABLET ORAL at 17:26

## 2020-07-25 RX ADMIN — Medication 1: at 14:26

## 2020-07-25 RX ADMIN — Medication 50 GRAM(S): at 08:48

## 2020-07-25 RX ADMIN — Medication 1 APPLICATION(S): at 12:31

## 2020-07-25 RX ADMIN — HALOPERIDOL DECANOATE 2 MILLIGRAM(S): 100 INJECTION INTRAMUSCULAR at 01:41

## 2020-07-25 RX ADMIN — HALOPERIDOL DECANOATE 0.5 MILLIGRAM(S): 100 INJECTION INTRAMUSCULAR at 05:37

## 2020-07-25 RX ADMIN — Medication 81 MILLIGRAM(S): at 12:34

## 2020-07-25 RX ADMIN — HALOPERIDOL DECANOATE 1 MILLIGRAM(S): 100 INJECTION INTRAMUSCULAR at 12:33

## 2020-07-25 RX ADMIN — Medication 500000 UNIT(S): at 13:00

## 2020-07-25 RX ADMIN — TAMSULOSIN HYDROCHLORIDE 0.8 MILLIGRAM(S): 0.4 CAPSULE ORAL at 20:37

## 2020-07-25 RX ADMIN — VALGANCICLOVIR 900 MILLIGRAM(S): 450 TABLET, FILM COATED ORAL at 12:34

## 2020-07-25 RX ADMIN — INSULIN GLARGINE 4 UNIT(S): 100 INJECTION, SOLUTION SUBCUTANEOUS at 21:13

## 2020-07-25 RX ADMIN — Medication 500000 UNIT(S): at 23:31

## 2020-07-25 RX ADMIN — HALOPERIDOL DECANOATE 0.5 MILLIGRAM(S): 100 INJECTION INTRAMUSCULAR at 17:20

## 2020-07-25 RX ADMIN — Medication 500000 UNIT(S): at 05:16

## 2020-07-25 RX ADMIN — MAGNESIUM OXIDE 400 MG ORAL TABLET 800 MILLIGRAM(S): 241.3 TABLET ORAL at 08:48

## 2020-07-25 RX ADMIN — PANTOPRAZOLE SODIUM 40 MILLIGRAM(S): 20 TABLET, DELAYED RELEASE ORAL at 05:15

## 2020-07-25 RX ADMIN — CYCLOSPORINE 200 MILLIGRAM(S): 100 CAPSULE ORAL at 08:47

## 2020-07-25 RX ADMIN — DAPTOMYCIN 132 MILLIGRAM(S): 500 INJECTION, POWDER, LYOPHILIZED, FOR SOLUTION INTRAVENOUS at 12:34

## 2020-07-25 RX ADMIN — Medication 50 MILLIGRAM(S): at 20:37

## 2020-07-25 NOTE — PROGRESS NOTE ADULT - SUBJECTIVE AND OBJECTIVE BOX
INTERVAL HPI/OVERNIGHT EVENTS:    events noted  remains agitated        MEDICATIONS  (STANDING):  aspirin enteric coated 81 milliGRAM(s) Oral daily  chlorhexidine 2% Cloths 1 Application(s) Topical <User Schedule>  collagenase Ointment 1 Application(s) Topical daily  furosemide    Tablet 80 milliGRAM(s) Oral every 8 hours  insulin glargine Injectable (LANTUS) 16 Unit(s) SubCutaneous at bedtime  insulin lispro (HumaLOG) corrective regimen sliding scale   SubCutaneous three times a day before meals  insulin lispro (HumaLOG) corrective regimen sliding scale   SubCutaneous at bedtime  insulin lispro Injectable (HumaLOG) 10 Unit(s) SubCutaneous before dinner  insulin lispro Injectable (HumaLOG) 8 Unit(s) SubCutaneous before breakfast  insulin lispro Injectable (HumaLOG) 8 Unit(s) SubCutaneous before lunch  linezolid  IVPB 600 milliGRAM(s) IV Intermittent every 12 hours  magnesium oxide 800 milliGRAM(s) Oral two times a day with meals  meropenem  IVPB 1000 milliGRAM(s) IV Intermittent every 8 hours  nystatin    Suspension 378658 Unit(s) Swish and Swallow four times a day  nystatin Cream 1 Application(s) Topical two times a day  pantoprazole  Injectable 40 milliGRAM(s) IV Push every 12 hours  polyethylene glycol 3350 17 Gram(s) Oral daily  predniSONE   Tablet 5 milliGRAM(s) Oral daily  senna 2 Tablet(s) Oral at bedtime  tacrolimus 3 milliGRAM(s) Oral <User Schedule>  tacrolimus 2 milliGRAM(s) Oral <User Schedule>  tamsulosin 0.8 milliGRAM(s) Oral at bedtime  trimethoprim   80 mG/sulfamethoxazole 400 mG 1 Tablet(s) Oral daily  valGANciclovir 450 milliGRAM(s) Oral daily    MEDICATIONS  (PRN):  glucagon  Injectable 1 milliGRAM(s) IntraMuscular once PRN Glucose <70 milliGRAM(s)/deciLiter      Allergies    codeine (Anaphylaxis)    Intolerances        Review of Systems:    General:  No wt loss, fevers, chills, night sweats,fatigue,   Eyes:  Good vision, no reported pain  ENT:  No sore throat, pain, runny nose, dysphagia  CV:  No pain, palpitatioins, hypo/hypertension  Resp:  No dyspnea, cough, tachypnea, wheezing  GI:  No pain, No nausea, No vomiting, No diarrhea, No constipatiion, No weight loss, No fever, No pruritis, No rectal bleeding, No tarry stools, No dysphagia,  :  No pain, bleeding, incontinence, nocturia  Muscle:  No pain, weakness  Neuro:  No weakness, tingling, memory problems  Psych:  No fatigue, insomnia, mood problems, depression  Endocrine:  No polyuria, polydypsia, cold/heat intolerance  Heme:  No petechiae, ecchymosis, easy bruisability  Skin:  No rash, tattoos, scars, edema      Vital Signs Last 24 Hrs  T(C): 36.3 (12 Jul 2020 15:00), Max: 36.8 (11 Jul 2020 19:00)  T(F): 97.3 (12 Jul 2020 15:00), Max: 98.2 (11 Jul 2020 19:00)  HR: 80 (12 Jul 2020 15:00) (72 - 83)  BP: 109/59 (12 Jul 2020 15:00) (94/55 - 154/68)  BP(mean): 78 (12 Jul 2020 15:00) (70 - 125)  RR: 15 (12 Jul 2020 15:00) (9 - 28)  SpO2: 96% (12 Jul 2020 15:00) (96% - 100%)    PHYSICAL EXAM:    Constitutional: NAD  HEENT: sclera anicteric   Neck: No LAD, supple  Gastrointestinal: BS+, soft, ND, +incisional tenderness, incisions c/d/i  Extremities: +b/l peripheral edema  Neurological: awake, alert, +tremors - improving     LABS:                        9.3    5.51  )-----------( 98       ( 12 Jul 2020 01:54 )             28.6     07-12    131<L>  |  95<L>  |  31<H>  ----------------------------<  217<H>  5.0   |  27  |  1.26    Ca    8.1<L>      12 Jul 2020 12:22  Phos  4.0     07-12  Mg     2.0     07-12    TPro  4.4<L>  /  Alb  2.6<L>  /  TBili  1.1  /  DBili  0.5<H>  /  AST  64<H>  /  ALT  98<H>  /  AlkPhos  212<H>  07-12    PT/INR - ( 12 Jul 2020 01:54 )   PT: 12.7 sec;   INR: 1.12 ratio         PTT - ( 12 Jul 2020 01:54 )  PTT:28.7 sec      RADIOLOGY & ADDITIONAL TESTS:

## 2020-07-25 NOTE — PROGRESS NOTE ADULT - SUBJECTIVE AND OBJECTIVE BOX
Chief Complaint:  Patient is a 64y old  Male who presents with a chief complaint of Liver failure, hepatic encephalopathy (25 Jul 2020 07:43)    Reason for consult: s/p OLT    Interval Events: Pt reports feeling agitated, remains on 1:1.     Hospital Medications:  aspirin enteric coated 81 milliGRAM(s) Oral daily  chlorhexidine 2% Cloths 1 Application(s) Topical <User Schedule>  collagenase Ointment 1 Application(s) Topical daily  cycloSPORINE  , modified (GENGRAF) 200 milliGRAM(s) Oral <User Schedule>  DAPTOmycin IVPB 800 milliGRAM(s) IV Intermittent every 24 hours  haloperidol    Injectable 0.5 milliGRAM(s) IV Push every 12 hours PRN  insulin glargine Injectable (LANTUS) 4 Unit(s) SubCutaneous at bedtime  insulin lispro (HumaLOG) corrective regimen sliding scale   SubCutaneous three times a day before meals  insulin lispro (HumaLOG) corrective regimen sliding scale   SubCutaneous at bedtime  magnesium oxide 800 milliGRAM(s) Oral two times a day with meals  mycophenolate mofetil 750 milliGRAM(s) Oral <User Schedule>  nystatin    Suspension 914969 Unit(s) Oral four times a day  nystatin Cream 1 Application(s) Topical two times a day  pantoprazole    Tablet 40 milliGRAM(s) Oral before breakfast  predniSONE   Tablet 5 milliGRAM(s) Oral daily  tamsulosin 0.8 milliGRAM(s) Oral at bedtime  traZODone 50 milliGRAM(s) Oral at bedtime  trimethoprim   80 mG/sulfamethoxazole 400 mG 1 Tablet(s) Oral daily  valGANciclovir 900 milliGRAM(s) Oral daily      ROS:   Unable to obtain    PHYSICAL EXAM:   Vital Signs:  Vital Signs Last 24 Hrs  T(C): 36.4 (25 Jul 2020 05:00), Max: 37.1 (24 Jul 2020 22:00)  T(F): 97.6 (25 Jul 2020 05:00), Max: 98.7 (24 Jul 2020 22:00)  HR: 81 (25 Jul 2020 05:00) (74 - 100)  BP: 128/54 (25 Jul 2020 05:00) (92/51 - 155/70)  BP(mean): 114 (24 Jul 2020 17:00) (114 - 114)  RR: 18 (25 Jul 2020 05:00) (17 - 18)  SpO2: 96% (25 Jul 2020 05:00) (96% - 100%)  Daily     Daily     GENERAL: no acute distress  NEURO: alert, answers some questions, A&Ox2, no asterixis, + total body tremors  HEENT: anicteric sclera, no conjunctival pallor appreciated  CHEST: no respiratory distress, no accessory muscle use  CARDIAC: regular rate, rhythm  ABDOMEN: soft, non-tender, non-distended, no rebound or guarding, scars well healed  EXTREMITIES: warm, well perfused, no edema  SKIN: no lesions noted    LABS: reviewed                        7.8    3.65  )-----------( 81       ( 25 Jul 2020 06:00 )             23.4     07-25    135  |  102  |  40<H>  ----------------------------<  113<H>  5.2   |  23  |  1.52<H>    Ca    8.9      25 Jul 2020 06:00  Phos  3.6     07-25  Mg     1.9     07-25    TPro  5.4<L>  /  Alb  3.1<L>  /  TBili  0.9  /  DBili  0.3<H>  /  AST  14  /  ALT  17  /  AlkPhos  107  07-25    LIVER FUNCTIONS - ( 25 Jul 2020 06:00 )  Alb: 3.1 g/dL / Pro: 5.4 g/dL / ALK PHOS: 107 U/L / ALT: 17 U/L / AST: 14 U/L / GGT: x             Interval Diagnostic Studies: see sunrise for full report

## 2020-07-25 NOTE — PROGRESS NOTE ADULT - ATTENDING COMMENTS
POD 23 liver transplant  good graft function, on cyclosporin    main issue currently is delirium, cause uncertain.  -worse overnight - hyperactive, requiring fulltime supervision  -during daytime, disoriented to time but responding appropriately  -cause uncertain, presumed secondary to medications tisha. CNI.  Completing daptomycin course for VRE tomorrow    abdo soft nontender, wound healed, t-tube ligated    Plan  try trazadone tonight  prn haldol  plan for MRI brain under sedation tomorrow

## 2020-07-25 NOTE — PROGRESS NOTE ADULT - ASSESSMENT
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    [] POD 23 s/p OLT   - Good graft function, LFTs stable   - Immuno: off tacro as of 7/19. Cyclo 200mg bid (will adjust by level)), MMF 750mg bid; Pred 5mg daily  - PPX: valcyte/nystatin/bactrim  - Continue ASA 81mg daily  - h/o UGI bleed: Protonix 40mg daily   - Pain control PRN  - will give haldol PRN for agitation, will add Trazadone tonight   - DVT PPx: SCDs at all times  - Sacral decub: seen by  wound care this am, recs appreciated     [] VRE bacteremia (7/10)  - ID following: on dapto  - Bld cx from 7/20 with NG, urine cx (7/20) with NG     [] Hyperglycemia  - Appreciate endocrinology recommendations  - Lantus 8u and Humalog insulin wih SSI coverage  - Fingersticks ac and qhs    [] Heel ulcer  - Continue local wound care w/ santyl and DSD as per podiatry    [] Dispo:   - continue 1:1 with tele  - F/U PT recs for possible Discharge to acute vs [facility]  - SW with tx team working with pt & family on DC plan

## 2020-07-25 NOTE — PROGRESS NOTE ADULT - SUBJECTIVE AND OBJECTIVE BOX
Transplant Surgery Progress Note    OLTx      Date:  7/2/2020       POD#23    Present:  Patient seen with multidisciplinary team including Transplant Surgeon: Dr. Ackerman Transplant Nephrology attending Amanda, ZBIGNIEW Hurley, PGY 3 Dr. Quezada in am rounds and examined by Dr. Ackerman. 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     Re-admitted on 6/11:   ·	worsening HE  ·	UTI/VRE: tx with linezolid 6/15-22, bactrim DS 6/22 - 7/2  ·	MISA/Hyponatremia  ·	UGI bleed (melena) s/p EGD (6/22) c/w hemorrhagic duodenitis/gastritis; Grade 2 EV; requiring multiple transfusions  ·	Known h/o R foot ulcer (MRI neg for OM)  ·	s/p OLTx on 7/2/2020, post op liver doppler with patent vessels  ·	post op course c/b delirium and VRE bacteremia (7/10)     Interval events:  - POD 23 s/p OLT with good graft function;  LFTs stable  - Mental status same: confused, + tremors, agitated this AM, did not sleep overnight, Given total 5mg haldol through course of night however remains agitated   - tolerating PO     Potential Discharge date: pending clinical improvement     Education:  Medications    Plan of care:  See Below  O:  Vital Signs Last 24 Hrs  T(C): 36.4 (25 Jul 2020 05:00), Max: 37.1 (24 Jul 2020 22:00)  T(F): 97.6 (25 Jul 2020 05:00), Max: 98.7 (24 Jul 2020 22:00)  HR: 81 (25 Jul 2020 05:00) (74 - 100)  BP: 128/54 (25 Jul 2020 05:00) (92/51 - 155/70)  BP(mean): 114 (24 Jul 2020 17:00) (114 - 114)  RR: 18 (25 Jul 2020 05:00) (17 - 18)  SpO2: 96% (25 Jul 2020 05:00) (96% - 100%)    I&O's Detail    24 Jul 2020 07:01  -  25 Jul 2020 07:00  --------------------------------------------------------  IN:    Oral Fluid: 680 mL    Solution: 50 mL    Solution: 50 mL  Total IN: 780 mL    OUT:    Voided: 1600 mL  Total OUT: 1600 mL    Total NET: -820 mL          MEDICATIONS  (STANDING):  aspirin enteric coated 81 milliGRAM(s) Oral daily  chlorhexidine 2% Cloths 1 Application(s) Topical <User Schedule>  collagenase Ointment 1 Application(s) Topical daily  cycloSPORINE  , modified (GENGRAF) 200 milliGRAM(s) Oral <User Schedule>  DAPTOmycin IVPB 800 milliGRAM(s) IV Intermittent every 24 hours  insulin glargine Injectable (LANTUS) 4 Unit(s) SubCutaneous at bedtime  insulin lispro (HumaLOG) corrective regimen sliding scale   SubCutaneous three times a day before meals  insulin lispro (HumaLOG) corrective regimen sliding scale   SubCutaneous at bedtime  magnesium oxide 800 milliGRAM(s) Oral two times a day with meals  mycophenolate mofetil 750 milliGRAM(s) Oral <User Schedule>  nystatin    Suspension 993159 Unit(s) Oral four times a day  nystatin Cream 1 Application(s) Topical two times a day  pantoprazole    Tablet 40 milliGRAM(s) Oral before breakfast  predniSONE   Tablet 5 milliGRAM(s) Oral daily  tamsulosin 0.8 milliGRAM(s) Oral at bedtime  traZODone 50 milliGRAM(s) Oral at bedtime  trimethoprim   80 mG/sulfamethoxazole 400 mG 1 Tablet(s) Oral daily  valGANciclovir 900 milliGRAM(s) Oral daily    MEDICATIONS  (PRN):  haloperidol    Injectable 0.5 milliGRAM(s) IV Push every 12 hours PRN Agitation                            7.8    3.65  )-----------( 81       ( 25 Jul 2020 06:00 )             23.4       07-25    135  |  102  |  40<H>  ----------------------------<  113<H>  5.2   |  23  |  1.52<H>    Ca    8.9      25 Jul 2020 06:00  Phos  3.6     07-25  Mg     1.9     07-25    TPro  5.4<L>  /  Alb  3.1<L>  /  TBili  0.9  /  DBili  0.3<H>  /  AST  14  /  ALT  17  /  AlkPhos  107  07-25      Constitutional: AMS, + tremors  Eyes: Anicteric, PERRLA  ENMT: nc/at  Neck: no central line, no JVD  Respiratory: CTA B/L, unlabored breathing   Cardiovascular: RRR  Gastrointestinal: soft, NT, ND, incision c/d/i, T-tube tied   Genitourinary: Voiding spontaneously  Extremities: heel wound without drainage or purulent material, edema improving   Vascular: Palpable dp pulses bilaterally  Neurological: Confused with easy reorientation, +mild tremors, following commands  Skin: no rashes  Musculoskeletal: Moving all extremities  Psychiatric: Responsive

## 2020-07-25 NOTE — PROGRESS NOTE ADULT - ATTENDING COMMENTS
Patient was seen and examined with hepatology team on rounds on 7/25/2020.  Patient s/p liver transplant, day #23, with delirium from presumed CNI toxicity, switched to cyclosporin, continue Cellcept, prednisone, and prophylactic treatment with Bactrium, Nystatin, and Valcyte, and Daptomycin for VRE, MR of brain per transplant surgery's recommendation, continue care primary team

## 2020-07-26 LAB
ALBUMIN SERPL ELPH-MCNC: 3.3 G/DL — SIGNIFICANT CHANGE UP (ref 3.3–5)
ALP SERPL-CCNC: 107 U/L — SIGNIFICANT CHANGE UP (ref 40–120)
ALT FLD-CCNC: 16 U/L — SIGNIFICANT CHANGE UP (ref 10–45)
ANION GAP SERPL CALC-SCNC: 7 MMOL/L — SIGNIFICANT CHANGE UP (ref 5–17)
APTT BLD: 29.3 SEC — SIGNIFICANT CHANGE UP (ref 27.5–35.5)
AST SERPL-CCNC: 18 U/L — SIGNIFICANT CHANGE UP (ref 10–40)
BILIRUB DIRECT SERPL-MCNC: 0.3 MG/DL — HIGH (ref 0–0.2)
BILIRUB INDIRECT FLD-MCNC: 0.6 MG/DL — SIGNIFICANT CHANGE UP (ref 0.2–1)
BILIRUB SERPL-MCNC: 0.9 MG/DL — SIGNIFICANT CHANGE UP (ref 0.2–1.2)
BUN SERPL-MCNC: 33 MG/DL — HIGH (ref 7–23)
CALCIUM SERPL-MCNC: 9.2 MG/DL — SIGNIFICANT CHANGE UP (ref 8.4–10.5)
CHLORIDE SERPL-SCNC: 106 MMOL/L — SIGNIFICANT CHANGE UP (ref 96–108)
CO2 SERPL-SCNC: 25 MMOL/L — SIGNIFICANT CHANGE UP (ref 22–31)
CREAT SERPL-MCNC: 1.36 MG/DL — HIGH (ref 0.5–1.3)
CULTURE RESULTS: SIGNIFICANT CHANGE UP
CULTURE RESULTS: SIGNIFICANT CHANGE UP
CYCLOSPORINE SER-MCNC: 164 NG/ML — SIGNIFICANT CHANGE UP (ref 150–400)
GLUCOSE BLDC GLUCOMTR-MCNC: 101 MG/DL — HIGH (ref 70–99)
GLUCOSE BLDC GLUCOMTR-MCNC: 121 MG/DL — HIGH (ref 70–99)
GLUCOSE BLDC GLUCOMTR-MCNC: 125 MG/DL — HIGH (ref 70–99)
GLUCOSE BLDC GLUCOMTR-MCNC: 147 MG/DL — HIGH (ref 70–99)
GLUCOSE BLDC GLUCOMTR-MCNC: 195 MG/DL — HIGH (ref 70–99)
GLUCOSE SERPL-MCNC: 94 MG/DL — SIGNIFICANT CHANGE UP (ref 70–99)
HCT VFR BLD CALC: 23.6 % — LOW (ref 39–50)
HGB BLD-MCNC: 8.1 G/DL — LOW (ref 13–17)
INR BLD: 1.08 RATIO — SIGNIFICANT CHANGE UP (ref 0.88–1.16)
MAGNESIUM SERPL-MCNC: 2 MG/DL — SIGNIFICANT CHANGE UP (ref 1.6–2.6)
MCHC RBC-ENTMCNC: 32.7 PG — SIGNIFICANT CHANGE UP (ref 27–34)
MCHC RBC-ENTMCNC: 34.3 GM/DL — SIGNIFICANT CHANGE UP (ref 32–36)
MCV RBC AUTO: 95.2 FL — SIGNIFICANT CHANGE UP (ref 80–100)
NRBC # BLD: 0 /100 WBCS — SIGNIFICANT CHANGE UP (ref 0–0)
PHOSPHATE SERPL-MCNC: 3.1 MG/DL — SIGNIFICANT CHANGE UP (ref 2.5–4.5)
PLATELET # BLD AUTO: 101 K/UL — LOW (ref 150–400)
POTASSIUM SERPL-MCNC: 5.1 MMOL/L — SIGNIFICANT CHANGE UP (ref 3.5–5.3)
POTASSIUM SERPL-SCNC: 5.1 MMOL/L — SIGNIFICANT CHANGE UP (ref 3.5–5.3)
PROCALCITONIN SERPL-MCNC: 0.34 NG/ML — HIGH (ref 0.02–0.1)
PROT SERPL-MCNC: 5.5 G/DL — LOW (ref 6–8.3)
PROTHROM AB SERPL-ACNC: 12.8 SEC — SIGNIFICANT CHANGE UP (ref 10.6–13.6)
RBC # BLD: 2.48 M/UL — LOW (ref 4.2–5.8)
RBC # FLD: 17.8 % — HIGH (ref 10.3–14.5)
SODIUM SERPL-SCNC: 138 MMOL/L — SIGNIFICANT CHANGE UP (ref 135–145)
SPECIMEN SOURCE: SIGNIFICANT CHANGE UP
SPECIMEN SOURCE: SIGNIFICANT CHANGE UP
WBC # BLD: 3.57 K/UL — LOW (ref 3.8–10.5)
WBC # FLD AUTO: 3.57 K/UL — LOW (ref 3.8–10.5)

## 2020-07-26 PROCEDURE — 93010 ELECTROCARDIOGRAM REPORT: CPT

## 2020-07-26 PROCEDURE — 99232 SBSQ HOSP IP/OBS MODERATE 35: CPT | Mod: GC,24

## 2020-07-26 PROCEDURE — 99232 SBSQ HOSP IP/OBS MODERATE 35: CPT | Mod: GC

## 2020-07-26 RX ORDER — HALOPERIDOL DECANOATE 100 MG/ML
2 INJECTION INTRAMUSCULAR ONCE
Refills: 0 | Status: COMPLETED | OUTPATIENT
Start: 2020-07-26 | End: 2020-07-26

## 2020-07-26 RX ORDER — INSULIN GLARGINE 100 [IU]/ML
2 INJECTION, SOLUTION SUBCUTANEOUS AT BEDTIME
Refills: 0 | Status: DISCONTINUED | OUTPATIENT
Start: 2020-07-26 | End: 2020-07-27

## 2020-07-26 RX ORDER — TRAZODONE HCL 50 MG
100 TABLET ORAL AT BEDTIME
Refills: 0 | Status: DISCONTINUED | OUTPATIENT
Start: 2020-07-26 | End: 2020-07-28

## 2020-07-26 RX ADMIN — INSULIN GLARGINE 2 UNIT(S): 100 INJECTION, SOLUTION SUBCUTANEOUS at 21:45

## 2020-07-26 RX ADMIN — Medication 500000 UNIT(S): at 17:47

## 2020-07-26 RX ADMIN — MYCOPHENOLATE MOFETIL 750 MILLIGRAM(S): 250 CAPSULE ORAL at 08:57

## 2020-07-26 RX ADMIN — HALOPERIDOL DECANOATE 0.5 MILLIGRAM(S): 100 INJECTION INTRAMUSCULAR at 23:31

## 2020-07-26 RX ADMIN — CYCLOSPORINE 200 MILLIGRAM(S): 100 CAPSULE ORAL at 20:07

## 2020-07-26 RX ADMIN — Medication 500000 UNIT(S): at 12:22

## 2020-07-26 RX ADMIN — NYSTATIN CREAM 1 APPLICATION(S): 100000 CREAM TOPICAL at 06:01

## 2020-07-26 RX ADMIN — MAGNESIUM OXIDE 400 MG ORAL TABLET 800 MILLIGRAM(S): 241.3 TABLET ORAL at 17:47

## 2020-07-26 RX ADMIN — TAMSULOSIN HYDROCHLORIDE 0.8 MILLIGRAM(S): 0.4 CAPSULE ORAL at 20:06

## 2020-07-26 RX ADMIN — Medication 1 APPLICATION(S): at 12:21

## 2020-07-26 RX ADMIN — HALOPERIDOL DECANOATE 2 MILLIGRAM(S): 100 INJECTION INTRAMUSCULAR at 00:57

## 2020-07-26 RX ADMIN — MYCOPHENOLATE MOFETIL 750 MILLIGRAM(S): 250 CAPSULE ORAL at 20:05

## 2020-07-26 RX ADMIN — CYCLOSPORINE 200 MILLIGRAM(S): 100 CAPSULE ORAL at 08:54

## 2020-07-26 RX ADMIN — Medication 81 MILLIGRAM(S): at 12:22

## 2020-07-26 RX ADMIN — VALGANCICLOVIR 900 MILLIGRAM(S): 450 TABLET, FILM COATED ORAL at 12:25

## 2020-07-26 RX ADMIN — Medication 1 TABLET(S): at 12:22

## 2020-07-26 RX ADMIN — NYSTATIN CREAM 1 APPLICATION(S): 100000 CREAM TOPICAL at 17:47

## 2020-07-26 RX ADMIN — DAPTOMYCIN 132 MILLIGRAM(S): 500 INJECTION, POWDER, LYOPHILIZED, FOR SOLUTION INTRAVENOUS at 12:22

## 2020-07-26 RX ADMIN — PANTOPRAZOLE SODIUM 40 MILLIGRAM(S): 20 TABLET, DELAYED RELEASE ORAL at 06:00

## 2020-07-26 RX ADMIN — Medication 500000 UNIT(S): at 06:00

## 2020-07-26 RX ADMIN — Medication 100 MILLIGRAM(S): at 20:06

## 2020-07-26 RX ADMIN — CHLORHEXIDINE GLUCONATE 1 APPLICATION(S): 213 SOLUTION TOPICAL at 05:25

## 2020-07-26 RX ADMIN — Medication 500000 UNIT(S): at 21:46

## 2020-07-26 NOTE — PROGRESS NOTE ADULT - ASSESSMENT
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 c/b VRE bactermia and delerium    [] POD 23 s/p OLT   - Good graft function, LFTs stable   - Immuno: off tacro as of 7/19. Cyclo 200mg bid (will adjust by level)), MMF 750mg bid; Pred 5mg daily  - PPX: valcyte/nystatin/bactrim  - Continue ASA 81mg daily  - h/o UGI bleed: Protonix 40mg daily   - Carb restricted diet as tolerated  - NPO after MN tonight for MRI  - MRI scheduled for Monday 7/27 with sedation by Anesthesia due to agitation  - Increase Trazadone to 100mg qhs and continue Haldol PRN for agitation   - DVT PPx: SCDs at all times  - Sacral decub: seen by  wound care, recs appreciated   - Pain control PRN    [] VRE bacteremia (7/10)  - ID following: on dapto.  Stop date 7/26  - Bld cx from 7/20 with NG, urine cx (7/20) with NG     [] Hyperglycemia  - Appreciate endocrinology recommendations  - Insulin regimen adjusted for low blood glucose levels due to AMS and limited oral intake   - Continue Lantus 4U and Humalog insulin SSI coverage for now (was previously on Humalog 5U premeal and Lantus 8 units qhs)  - Fingersticks ac and qhs    [] Heel ulcer  - Continue local wound care w/ santyl and DSD as per podiatry    [] Dispo:   - continue 1:1 with tele  - F/U PT recs for possible Discharge to acute vs [facility]  - SW with tx team working with pt & family on DC plan

## 2020-07-26 NOTE — PROGRESS NOTE ADULT - SUBJECTIVE AND OBJECTIVE BOX
Chief Complaint:  Patient is a 64y old  Male who presents with a chief complaint of Liver failure, hepatic encephalopathy (2020 12:07)      Reason for consult:    Interval Events:     Hospital Medications:  aspirin enteric coated 81 milliGRAM(s) Oral daily  chlorhexidine 2% Cloths 1 Application(s) Topical <User Schedule>  collagenase Ointment 1 Application(s) Topical daily  cycloSPORINE  , modified (GENGRAF) 200 milliGRAM(s) Oral <User Schedule>  haloperidol    Injectable 0.5 milliGRAM(s) IV Push every 12 hours PRN  insulin glargine Injectable (LANTUS) 4 Unit(s) SubCutaneous at bedtime  insulin lispro (HumaLOG) corrective regimen sliding scale   SubCutaneous three times a day before meals  insulin lispro (HumaLOG) corrective regimen sliding scale   SubCutaneous at bedtime  magnesium oxide 800 milliGRAM(s) Oral two times a day with meals  mycophenolate mofetil 750 milliGRAM(s) Oral <User Schedule>  nystatin    Suspension 077959 Unit(s) Oral four times a day  nystatin Cream 1 Application(s) Topical two times a day  pantoprazole    Tablet 40 milliGRAM(s) Oral before breakfast  predniSONE   Tablet 5 milliGRAM(s) Oral daily  tamsulosin 0.8 milliGRAM(s) Oral at bedtime  traZODone 100 milliGRAM(s) Oral at bedtime  trimethoprim   80 mG/sulfamethoxazole 400 mG 1 Tablet(s) Oral daily  valGANciclovir 900 milliGRAM(s) Oral daily      ROS:   General:  No  fevers, chills, night sweats, fatigue  Eyes:  Good vision, no reported pain  ENT:  No sore throat, pain, runny nose  CV:  No pain, palpitations  Pulm:  No dyspnea, cough  GI:  See HPI, otherwise negative  :  No  incontinence, nocturia  Muscle:  No pain, weakness  Neuro:  No memory problems  Psych:  No insomnia, mood problems, depression  Endocrine:  No polyuria, polydipsia, cold/heat intolerance  Heme:  No petechiae, ecchymosis, easy bruisability  Skin:  No rash    PHYSICAL EXAM:   Vital Signs:  Vital Signs Last 24 Hrs  T(C): 36.7 (2020 09:00), Max: 37.1 (2020 13:00)  T(F): 98.1 (2020 09:00), Max: 98.7 (2020 13:00)  HR: 77 (2020 09:00) (77 - 87)  BP: 139/67 (2020 09:00) (120/59 - 150/80)  BP(mean): --  RR: 18 (2020 05:00) (18 - 18)  SpO2: 100% (2020 09:00) (95% - 100%)  Daily     Daily Weight in k.3 (2020 11:00)    GENERAL: anxious  NEURO: alert, not oriented, + tremors, no asterixis  HEENT: anicteric sclera, no conjunctival pallor appreciated  CHEST: no respiratory distress, no accessory muscle use  CARDIAC: regular rate, rhythm  ABDOMEN: soft, non-tender, non-distended, no rebound or guarding  EXTREMITIES: warm, well perfused, no edema  SKIN: no lesions noted    LABS: reviewed                        8.1    3.57  )-----------( 101      ( 2020 06:03 )             23.6     07-26    138  |  106  |  33<H>  ----------------------------<  94  5.1   |  25  |  1.36<H>    Ca    9.2      2020 06:03  Phos  3.1     07-26  Mg     2.0     07-26    TPro  5.5<L>  /  Alb  3.3  /  TBili  0.9  /  DBili  0.3<H>  /  AST  18  /  ALT  16  /  AlkPhos  107  07-26    LIVER FUNCTIONS - ( 2020 06:03 )  Alb: 3.3 g/dL / Pro: 5.5 g/dL / ALK PHOS: 107 U/L / ALT: 16 U/L / AST: 18 U/L / GGT: x             Interval Diagnostic Studies: see sunrise for full report

## 2020-07-26 NOTE — PROGRESS NOTE ADULT - SUBJECTIVE AND OBJECTIVE BOX
Transplant Surgery Progress Note    OLTx      Date:  7/2/2020       POD#24    Present:  Patient seen with multidisciplinary team including Transplant Surgeon: Dr. Ackerman Transplant Nephrology attending Dr.G Patel, NP Xiao Hurley in am rounds and examined by Dr. Ackerman. 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     Re-admitted on 6/11:   ·	worsening HE  ·	UTI/VRE: tx with linezolid 6/15-22, bactrim DS 6/22 - 7/2  ·	MISA/Hyponatremia  ·	UGI bleed (melena) s/p EGD (6/22) c/w hemorrhagic duodenitis/gastritis; Grade 2 EV; requiring multiple transfusions  ·	Known h/o R foot ulcer (MRI neg for OM)  ·	s/p OLTx on 7/2/2020, post op liver doppler with patent vessels  ·	post op course c/b delirium and VRE bacteremia (7/10)     Interval events:  - POD 24 s/p OLT with good graft function;  LFTs stable  - Received Tramadol 50mg at bedtime with no effect.  Required additional Haldol 2mg x2 for severe agitation and confusion  - Mental status unchanged this AM: confused, + tremors, agitated  - MRI brain with sedation to be given by anesthesia ordered  - tolerating PO   - Afebrile.  WBC 3.57   On Daptomycin for VRE bacteremia.  7/20 BCx and UCx ngtd    Potential Discharge date: pending clinical improvement     Education:  Medications    Plan of care:  See Below      MEDICATIONS  (STANDING):  aspirin enteric coated 81 milliGRAM(s) Oral daily  chlorhexidine 2% Cloths 1 Application(s) Topical <User Schedule>  collagenase Ointment 1 Application(s) Topical daily  cycloSPORINE  , modified (GENGRAF) 200 milliGRAM(s) Oral <User Schedule>  DAPTOmycin IVPB 800 milliGRAM(s) IV Intermittent every 24 hours  insulin glargine Injectable (LANTUS) 4 Unit(s) SubCutaneous at bedtime  insulin lispro (HumaLOG) corrective regimen sliding scale   SubCutaneous three times a day before meals  insulin lispro (HumaLOG) corrective regimen sliding scale   SubCutaneous at bedtime  magnesium oxide 800 milliGRAM(s) Oral two times a day with meals  mycophenolate mofetil 750 milliGRAM(s) Oral <User Schedule>  nystatin    Suspension 576816 Unit(s) Oral four times a day  nystatin Cream 1 Application(s) Topical two times a day  pantoprazole    Tablet 40 milliGRAM(s) Oral before breakfast  predniSONE   Tablet 5 milliGRAM(s) Oral daily  tamsulosin 0.8 milliGRAM(s) Oral at bedtime  traZODone 100 milliGRAM(s) Oral at bedtime  trimethoprim   80 mG/sulfamethoxazole 400 mG 1 Tablet(s) Oral daily  valGANciclovir 900 milliGRAM(s) Oral daily    MEDICATIONS  (PRN):  haloperidol    Injectable 0.5 milliGRAM(s) IV Push every 12 hours PRN Agitation      PAST MEDICAL & SURGICAL HISTORY:  GIB (gastrointestinal bleeding)  GERD with esophagitis: Gastritis &amp; Non Bleeding Ulcers  Hepatic encephalopathy  Obesity  Fatty liver disease, nonalcoholic  Renal stones: 25 years ago  Hypertension  Neuropathy  Hypercholesteremia  Diabetes  S/P cholecystectomy      Vital Signs Last 24 Hrs  T(C): 36.7 (26 Jul 2020 09:00), Max: 37.1 (25 Jul 2020 13:00)  T(F): 98.1 (26 Jul 2020 09:00), Max: 98.7 (25 Jul 2020 13:00)  HR: 77 (26 Jul 2020 09:00) (77 - 87)  BP: 139/67 (26 Jul 2020 09:00) (120/59 - 150/80)  BP(mean): --  RR: 18 (26 Jul 2020 05:00) (18 - 18)  SpO2: 100% (26 Jul 2020 09:00) (95% - 100%)    I&O's Summary    25 Jul 2020 07:01  -  26 Jul 2020 07:00  --------------------------------------------------------  IN: 120 mL / OUT: 750 mL / NET: -630 mL                              8.1    3.57  )-----------( 101      ( 26 Jul 2020 06:03 )             23.6     07-26    138  |  106  |  33<H>  ----------------------------<  94  5.1   |  25  |  1.36<H>    Ca    9.2      26 Jul 2020 06:03  Phos  3.1     07-26  Mg     2.0     07-26    TPro  5.5<L>  /  Alb  3.3  /  TBili  0.9  /  DBili  0.3<H>  /  AST  18  /  ALT  16  /  AlkPhos  107  07-26        Culture - Blood (collected 07-20-20 @ 23:18)  Source: .Blood Blood  Final Report (07-26-20 @ 01:01):    No Growth Final    Culture - Blood (collected 07-20-20 @ 23:18)  Source: .Blood Blood  Final Report (07-26-20 @ 01:01):    No Growth Final    Culture - Urine (collected 07-20-20 @ 15:23)  Source: .Urine Clean Catch (Midstream)  Final Report (07-21-20 @ 13:20):    No growth      ROS  Unable to obtain due to AMS    Physical Exam  Constitutional: AMS, + tremors  Eyes: Anicteric, PERRLA  ENMT: nc/at  Neck: no central line, no JVD  Respiratory: CTA B/L, unlabored breathing   Cardiovascular: RRR  Gastrointestinal: soft, NT, ND, incision c/d/i, T-tube tied   Genitourinary: Voiding spontaneously  Extremities: heel wound without drainage or purulent material, edema improving   Vascular: Palpable dp pulses bilaterally  Neurological: Confused, agitated, +tremors, TORREZ  Skin: no rashes  Musculoskeletal: Moving all extremities  Psychiatric: Responsive

## 2020-07-26 NOTE — PROGRESS NOTE ADULT - ASSESSMENT
64 M hx of DM, HLD, GERD, HFpEF with mild LV diastolic dysfunction, and decompensated JEAN cirrhosis c/b ascites with hx of SBP, HE, duodenal ulcer, GAVE and duodenal AVM s/p APC (last on 10/11/19), UNOS listed for liver transplantation at [facility]. 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 S/p liver transplant 7/2 transferred from SICU to floor on 7/17/20 now with signs of tacrolimus toxicity and significant hospital delerium.     Impression:  #S/p liver transplant 7/2: liver enzymes continue to downtrend  OR details:  - L groin cutdown for vv bypass   - anastomosis: bicaval end-end/CBD duct-duct/HA & PV end-end, all standard anatomy.    - IOC with good flow  - Simulect induction. 500mg Solumedrol  - JPs x3 with T-Tube  - 14U pRBC, 14U FFP, 10 PLT, 11 CRYO, 500mL returned from cell saver, EBL 5L, UOP 1100mL  Recipient Info:   ABO: A  CMV:  Neg  EBV Positive  Donor:  Donor ID:  [id number] Match: [id number]  Age: 29 ABO:  A1 High Risk:   No COD: Cardiovascular  Anti CMV positive, EBV IgG- positive  HepBcAb-neg, Hepatitis C-DANA- neg, Hepatitis C ab-neg    # Halluciniations/agitation: worse at night. Getting significant chemical sedations. At times combative. Likely secondary to sleep wake cycle disturbances vs. ICU delerium vs. medication induced. On Seroquel, mirtazapine, trazodone, and melatonin at night. Olanzapine added PRN per Behavioral Health 7/18/20.  # Tremors: Involving bilateral UE - concern for tacrolimus toxicity requiring dose adjustments per Transplant Surgery.   # Sacral decubitus ulcer: does not appear infected  # MISA: serum Cr improved again to 1.7. No episodes of hypotension. UOP remains good. Suspect MISA due to overdiuresis vs. tacrolimus toxicity vs. side effect of meropenem vs transient hypotension  # Elevated liver enzymes: Resolved with mildly elevated ALK-P. Possible secondary to DILI vs. sepsis induced from VRE bacteremia.   # VRE on culture from 7/10: Repeat culture 7/12 negative. On linezolid, s/p meropenem. CT showing groin hematoma, s/p aspiration showing likely seroma and low c/f infection per ID.   #GI bleed: Resolved. Hgb stable with no overt bleeding post-transplant. 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 GAVE and PHG likely improved posttransplant.  # Abnormal Chest X-Ray c/f evolving PNA vs atelectasis. No signs of active infection  # Hypertension: Had episode of hypotension. Now BP normal range.   # Lower ext edema: Improved with Lasix. Diuretics discontinued for MISA.   #R posterior heel wound w/ overlying eschar –Local wound care. no signs of infections, XR neg for gas, evaluated by podiatry and ID. MRI w/o contrast limited, but no overt signs of active infection.  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     Recommendation:  - f/u MRI brain under sedation  - frequent reorientation and visitation   - normalized sleep/wake cycle environment  - haldol as needed, agree w/ trazodone qHS  - continue prednisone 5 mg PO daily  - continue with cyclosporine 200mg BID; adjust by levels  - continue Cellcept (currently on 750 mg BID) - dosing as per transplant surgeryl  - abx per ID team  - continue nystatin, Bactrim and Valcyte ppx  - physical therapy daily to get OOB and ambulating  - continue with mag oxide 200 mg BID  - monitor Hb on daily CBC - continue PO PPI BID, monitor bowel movements  - continue to hold diuretics  - trend Cr, monitor for cyclosporine nephrotoxicity  - follow up Wound care recommendations for R heel ulcer and sacral decub  - follow up other Transplant surgery recommendations     Ze Dominguez  Gastroenterology Fellow  AT NIGHT AND ON WEEKENDS:  Contact on-call GI fellow via answering service ([phone]) from 5pm-8am and on weekends/holidays  MONDAY-FRIDAY 8AM-5PM:  Available via [employer] Teams  Call [phone] ([facility]) or Page [phone] ([facility]) from 8am-5pm M-F

## 2020-07-26 NOTE — PROGRESS NOTE ADULT - SUBJECTIVE AND OBJECTIVE BOX
INTERVAL HPI/OVERNIGHT EVENTS:    events noted  Much more relaxed today         MEDICATIONS  (STANDING):  aspirin enteric coated 81 milliGRAM(s) Oral daily  chlorhexidine 2% Cloths 1 Application(s) Topical <User Schedule>  collagenase Ointment 1 Application(s) Topical daily  furosemide    Tablet 80 milliGRAM(s) Oral every 8 hours  insulin glargine Injectable (LANTUS) 16 Unit(s) SubCutaneous at bedtime  insulin lispro (HumaLOG) corrective regimen sliding scale   SubCutaneous three times a day before meals  insulin lispro (HumaLOG) corrective regimen sliding scale   SubCutaneous at bedtime  insulin lispro Injectable (HumaLOG) 10 Unit(s) SubCutaneous before dinner  insulin lispro Injectable (HumaLOG) 8 Unit(s) SubCutaneous before breakfast  insulin lispro Injectable (HumaLOG) 8 Unit(s) SubCutaneous before lunch  linezolid  IVPB 600 milliGRAM(s) IV Intermittent every 12 hours  magnesium oxide 800 milliGRAM(s) Oral two times a day with meals  meropenem  IVPB 1000 milliGRAM(s) IV Intermittent every 8 hours  nystatin    Suspension 700306 Unit(s) Swish and Swallow four times a day  nystatin Cream 1 Application(s) Topical two times a day  pantoprazole  Injectable 40 milliGRAM(s) IV Push every 12 hours  polyethylene glycol 3350 17 Gram(s) Oral daily  predniSONE   Tablet 5 milliGRAM(s) Oral daily  senna 2 Tablet(s) Oral at bedtime  tacrolimus 3 milliGRAM(s) Oral <User Schedule>  tacrolimus 2 milliGRAM(s) Oral <User Schedule>  tamsulosin 0.8 milliGRAM(s) Oral at bedtime  trimethoprim   80 mG/sulfamethoxazole 400 mG 1 Tablet(s) Oral daily  valGANciclovir 450 milliGRAM(s) Oral daily    MEDICATIONS  (PRN):  glucagon  Injectable 1 milliGRAM(s) IntraMuscular once PRN Glucose <70 milliGRAM(s)/deciLiter      Allergies    codeine (Anaphylaxis)    Intolerances        Review of Systems:    General:  No wt loss, fevers, chills, night sweats,fatigue,   Eyes:  Good vision, no reported pain  ENT:  No sore throat, pain, runny nose, dysphagia  CV:  No pain, palpitatioins, hypo/hypertension  Resp:  No dyspnea, cough, tachypnea, wheezing  GI:  No pain, No nausea, No vomiting, No diarrhea, No constipatiion, No weight loss, No fever, No pruritis, No rectal bleeding, No tarry stools, No dysphagia,  :  No pain, bleeding, incontinence, nocturia  Muscle:  No pain, weakness  Neuro:  No weakness, tingling, memory problems  Psych:  No fatigue, insomnia, mood problems, depression  Endocrine:  No polyuria, polydypsia, cold/heat intolerance  Heme:  No petechiae, ecchymosis, easy bruisability  Skin:  No rash, tattoos, scars, edema      Vital Signs Last 24 Hrs  T(C): 36.3 (12 Jul 2020 15:00), Max: 36.8 (11 Jul 2020 19:00)  T(F): 97.3 (12 Jul 2020 15:00), Max: 98.2 (11 Jul 2020 19:00)  HR: 80 (12 Jul 2020 15:00) (72 - 83)  BP: 109/59 (12 Jul 2020 15:00) (94/55 - 154/68)  BP(mean): 78 (12 Jul 2020 15:00) (70 - 125)  RR: 15 (12 Jul 2020 15:00) (9 - 28)  SpO2: 96% (12 Jul 2020 15:00) (96% - 100%)    PHYSICAL EXAM:    Constitutional: NAD  HEENT: sclera anicteric   Neck: No LAD, supple  Gastrointestinal: BS+, soft, ND, +incisional tenderness, incisions c/d/i  Extremities: +b/l peripheral edema  Neurological: awake, alert, +tremors - improving     LABS:                        9.3    5.51  )-----------( 98       ( 12 Jul 2020 01:54 )             28.6     07-12    131<L>  |  95<L>  |  31<H>  ----------------------------<  217<H>  5.0   |  27  |  1.26    Ca    8.1<L>      12 Jul 2020 12:22  Phos  4.0     07-12  Mg     2.0     07-12    TPro  4.4<L>  /  Alb  2.6<L>  /  TBili  1.1  /  DBili  0.5<H>  /  AST  64<H>  /  ALT  98<H>  /  AlkPhos  212<H>  07-12    PT/INR - ( 12 Jul 2020 01:54 )   PT: 12.7 sec;   INR: 1.12 ratio         PTT - ( 12 Jul 2020 01:54 )  PTT:28.7 sec      RADIOLOGY & ADDITIONAL TESTS:

## 2020-07-26 NOTE — PROGRESS NOTE ADULT - ATTENDING COMMENTS
remains delirious, combative overnight  cause remains uncertain, possibly medication related, primary neurological cause not ruled out    Plan  titrate up trazadone and haldol doses  MRI brain with sedation  consideration of changing immunosuppression to mtor inhibitor

## 2020-07-27 LAB
ALBUMIN SERPL ELPH-MCNC: 3.4 G/DL — SIGNIFICANT CHANGE UP (ref 3.3–5)
ALP SERPL-CCNC: 113 U/L — SIGNIFICANT CHANGE UP (ref 40–120)
ALT FLD-CCNC: 19 U/L — SIGNIFICANT CHANGE UP (ref 10–45)
ANION GAP SERPL CALC-SCNC: 12 MMOL/L — SIGNIFICANT CHANGE UP (ref 5–17)
APTT BLD: 28.5 SEC — SIGNIFICANT CHANGE UP (ref 27.5–35.5)
AST SERPL-CCNC: 15 U/L — SIGNIFICANT CHANGE UP (ref 10–40)
BILIRUB DIRECT SERPL-MCNC: 0.4 MG/DL — HIGH (ref 0–0.2)
BILIRUB INDIRECT FLD-MCNC: 0.6 MG/DL — SIGNIFICANT CHANGE UP (ref 0.2–1)
BILIRUB SERPL-MCNC: 1 MG/DL — SIGNIFICANT CHANGE UP (ref 0.2–1.2)
BUN SERPL-MCNC: 32 MG/DL — HIGH (ref 7–23)
CALCIUM SERPL-MCNC: 9.2 MG/DL — SIGNIFICANT CHANGE UP (ref 8.4–10.5)
CHLORIDE SERPL-SCNC: 104 MMOL/L — SIGNIFICANT CHANGE UP (ref 96–108)
CHOLEST SERPL-MCNC: 180 MG/DL — SIGNIFICANT CHANGE UP (ref 10–199)
CHOLEST SERPL-MCNC: 191 MG/DL — SIGNIFICANT CHANGE UP (ref 10–199)
CO2 SERPL-SCNC: 22 MMOL/L — SIGNIFICANT CHANGE UP (ref 22–31)
CREAT SERPL-MCNC: 1.41 MG/DL — HIGH (ref 0.5–1.3)
CYCLOSPORINE SER-MCNC: 272 NG/ML — SIGNIFICANT CHANGE UP (ref 150–400)
GLUCOSE BLDC GLUCOMTR-MCNC: 120 MG/DL — HIGH (ref 70–99)
GLUCOSE BLDC GLUCOMTR-MCNC: 130 MG/DL — HIGH (ref 70–99)
GLUCOSE BLDC GLUCOMTR-MCNC: 143 MG/DL — HIGH (ref 70–99)
GLUCOSE BLDC GLUCOMTR-MCNC: 160 MG/DL — HIGH (ref 70–99)
GLUCOSE BLDC GLUCOMTR-MCNC: 177 MG/DL — HIGH (ref 70–99)
GLUCOSE BLDC GLUCOMTR-MCNC: 213 MG/DL — HIGH (ref 70–99)
GLUCOSE SERPL-MCNC: 121 MG/DL — HIGH (ref 70–99)
HCT VFR BLD CALC: 25.4 % — LOW (ref 39–50)
HDLC SERPL-MCNC: 45 MG/DL — SIGNIFICANT CHANGE UP
HDLC SERPL-MCNC: 47 MG/DL — SIGNIFICANT CHANGE UP
HGB BLD-MCNC: 8.5 G/DL — LOW (ref 13–17)
INR BLD: 1.12 RATIO — SIGNIFICANT CHANGE UP (ref 0.88–1.16)
LIPID PNL WITH DIRECT LDL SERPL: 110 MG/DL — HIGH
LIPID PNL WITH DIRECT LDL SERPL: 121 MG/DL — HIGH
MAGNESIUM SERPL-MCNC: 2 MG/DL — SIGNIFICANT CHANGE UP (ref 1.6–2.6)
MCHC RBC-ENTMCNC: 32.1 PG — SIGNIFICANT CHANGE UP (ref 27–34)
MCHC RBC-ENTMCNC: 33.5 GM/DL — SIGNIFICANT CHANGE UP (ref 32–36)
MCV RBC AUTO: 95.8 FL — SIGNIFICANT CHANGE UP (ref 80–100)
NRBC # BLD: 0 /100 WBCS — SIGNIFICANT CHANGE UP (ref 0–0)
PHOSPHATE SERPL-MCNC: 3.4 MG/DL — SIGNIFICANT CHANGE UP (ref 2.5–4.5)
PLATELET # BLD AUTO: 86 K/UL — LOW (ref 150–400)
POTASSIUM SERPL-MCNC: 5 MMOL/L — SIGNIFICANT CHANGE UP (ref 3.5–5.3)
POTASSIUM SERPL-SCNC: 5 MMOL/L — SIGNIFICANT CHANGE UP (ref 3.5–5.3)
PROCALCITONIN SERPL-MCNC: 0.29 NG/ML — HIGH (ref 0.02–0.1)
PROT SERPL-MCNC: 5.6 G/DL — LOW (ref 6–8.3)
PROTHROM AB SERPL-ACNC: 13.3 SEC — SIGNIFICANT CHANGE UP (ref 10.6–13.6)
RBC # BLD: 2.65 M/UL — LOW (ref 4.2–5.8)
RBC # FLD: 18 % — HIGH (ref 10.3–14.5)
SODIUM SERPL-SCNC: 138 MMOL/L — SIGNIFICANT CHANGE UP (ref 135–145)
TOTAL CHOLESTEROL/HDL RATIO MEASUREMENT: 4 RATIO — SIGNIFICANT CHANGE UP (ref 3.4–9.6)
TOTAL CHOLESTEROL/HDL RATIO MEASUREMENT: 4.1 RATIO — SIGNIFICANT CHANGE UP (ref 3.4–9.6)
TRIGL SERPL-MCNC: 117 MG/DL — SIGNIFICANT CHANGE UP (ref 10–149)
TRIGL SERPL-MCNC: 122 MG/DL — SIGNIFICANT CHANGE UP (ref 10–149)
WBC # BLD: 3.46 K/UL — LOW (ref 3.8–10.5)
WBC # FLD AUTO: 3.46 K/UL — LOW (ref 3.8–10.5)

## 2020-07-27 PROCEDURE — 99232 SBSQ HOSP IP/OBS MODERATE 35: CPT

## 2020-07-27 PROCEDURE — 99232 SBSQ HOSP IP/OBS MODERATE 35: CPT | Mod: GC,24

## 2020-07-27 PROCEDURE — 99233 SBSQ HOSP IP/OBS HIGH 50: CPT

## 2020-07-27 PROCEDURE — 93010 ELECTROCARDIOGRAM REPORT: CPT

## 2020-07-27 RX ORDER — SODIUM CHLORIDE 9 MG/ML
1000 INJECTION INTRAMUSCULAR; INTRAVENOUS; SUBCUTANEOUS
Refills: 0 | Status: DISCONTINUED | OUTPATIENT
Start: 2020-07-27 | End: 2020-07-27

## 2020-07-27 RX ORDER — EVEROLIMUS 10 MG/1
3 TABLET ORAL ONCE
Refills: 0 | Status: COMPLETED | OUTPATIENT
Start: 2020-07-27 | End: 2020-07-27

## 2020-07-27 RX ORDER — INSULIN LISPRO 100/ML
VIAL (ML) SUBCUTANEOUS
Refills: 0 | Status: DISCONTINUED | OUTPATIENT
Start: 2020-07-27 | End: 2020-08-11

## 2020-07-27 RX ORDER — INSULIN LISPRO 100/ML
VIAL (ML) SUBCUTANEOUS EVERY 6 HOURS
Refills: 0 | Status: DISCONTINUED | OUTPATIENT
Start: 2020-07-27 | End: 2020-07-27

## 2020-07-27 RX ORDER — EVEROLIMUS 10 MG/1
2 TABLET ORAL
Refills: 0 | Status: DISCONTINUED | OUTPATIENT
Start: 2020-07-27 | End: 2020-07-30

## 2020-07-27 RX ORDER — SODIUM CHLORIDE 9 MG/ML
1000 INJECTION INTRAMUSCULAR; INTRAVENOUS; SUBCUTANEOUS ONCE
Refills: 0 | Status: COMPLETED | OUTPATIENT
Start: 2020-07-27 | End: 2020-07-27

## 2020-07-27 RX ORDER — MYCOPHENOLATE MOFETIL 250 MG/1
1000 CAPSULE ORAL
Refills: 0 | Status: DISCONTINUED | OUTPATIENT
Start: 2020-07-28 | End: 2020-07-28

## 2020-07-27 RX ORDER — MYCOPHENOLATE MOFETIL 250 MG/1
750 CAPSULE ORAL
Refills: 0 | Status: COMPLETED | OUTPATIENT
Start: 2020-07-27 | End: 2020-07-27

## 2020-07-27 RX ORDER — EVEROLIMUS 10 MG/1
3 TABLET ORAL
Refills: 0 | Status: DISCONTINUED | OUTPATIENT
Start: 2020-07-27 | End: 2020-07-27

## 2020-07-27 RX ADMIN — EVEROLIMUS 3 MILLIGRAM(S): 10 TABLET ORAL at 10:30

## 2020-07-27 RX ADMIN — Medication 1 TABLET(S): at 12:47

## 2020-07-27 RX ADMIN — CYCLOSPORINE 200 MILLIGRAM(S): 100 CAPSULE ORAL at 08:15

## 2020-07-27 RX ADMIN — MYCOPHENOLATE MOFETIL 750 MILLIGRAM(S): 250 CAPSULE ORAL at 20:10

## 2020-07-27 RX ADMIN — Medication 2: at 13:31

## 2020-07-27 RX ADMIN — NYSTATIN CREAM 1 APPLICATION(S): 100000 CREAM TOPICAL at 17:27

## 2020-07-27 RX ADMIN — Medication 81 MILLIGRAM(S): at 12:42

## 2020-07-27 RX ADMIN — Medication 500000 UNIT(S): at 05:47

## 2020-07-27 RX ADMIN — TAMSULOSIN HYDROCHLORIDE 0.8 MILLIGRAM(S): 0.4 CAPSULE ORAL at 21:55

## 2020-07-27 RX ADMIN — Medication 500000 UNIT(S): at 17:25

## 2020-07-27 RX ADMIN — Medication 500000 UNIT(S): at 21:54

## 2020-07-27 RX ADMIN — SODIUM CHLORIDE 60 MILLILITER(S): 9 INJECTION INTRAMUSCULAR; INTRAVENOUS; SUBCUTANEOUS at 02:22

## 2020-07-27 RX ADMIN — MYCOPHENOLATE MOFETIL 750 MILLIGRAM(S): 250 CAPSULE ORAL at 08:15

## 2020-07-27 RX ADMIN — Medication 1 APPLICATION(S): at 12:42

## 2020-07-27 RX ADMIN — Medication 100 MILLIGRAM(S): at 21:55

## 2020-07-27 RX ADMIN — Medication 1: at 17:39

## 2020-07-27 RX ADMIN — SODIUM CHLORIDE 166.67 MILLILITER(S): 9 INJECTION INTRAMUSCULAR; INTRAVENOUS; SUBCUTANEOUS at 10:31

## 2020-07-27 RX ADMIN — Medication 500000 UNIT(S): at 12:42

## 2020-07-27 RX ADMIN — MAGNESIUM OXIDE 400 MG ORAL TABLET 800 MILLIGRAM(S): 241.3 TABLET ORAL at 17:26

## 2020-07-27 RX ADMIN — Medication 1: at 21:54

## 2020-07-27 RX ADMIN — EVEROLIMUS 2 MILLIGRAM(S): 10 TABLET ORAL at 20:10

## 2020-07-27 RX ADMIN — VALGANCICLOVIR 900 MILLIGRAM(S): 450 TABLET, FILM COATED ORAL at 12:47

## 2020-07-27 NOTE — PROGRESS NOTE ADULT - SUBJECTIVE AND OBJECTIVE BOX
Patient seen and examined.  Confused.  For MRI today.  Mod A with transfers and gait with therapies.    MEDICATIONS  (STANDING):  aspirin enteric coated 81 milliGRAM(s) Oral daily  chlorhexidine 2% Cloths 1 Application(s) Topical <User Schedule>  collagenase Ointment 1 Application(s) Topical daily  everolimus (ZORTRESS) 2 milliGRAM(s) Oral <User Schedule>  insulin lispro (HumaLOG) corrective regimen sliding scale   SubCutaneous Before meals and at bedtime  magnesium oxide 800 milliGRAM(s) Oral two times a day with meals  mycophenolate mofetil 750 milliGRAM(s) Oral <User Schedule>  nystatin    Suspension 332484 Unit(s) Oral four times a day  nystatin Cream 1 Application(s) Topical two times a day  pantoprazole    Tablet 40 milliGRAM(s) Oral before breakfast  predniSONE   Tablet 5 milliGRAM(s) Oral daily  tamsulosin 0.8 milliGRAM(s) Oral at bedtime  traZODone 100 milliGRAM(s) Oral at bedtime  trimethoprim   80 mG/sulfamethoxazole 400 mG 1 Tablet(s) Oral daily  valGANciclovir 900 milliGRAM(s) Oral daily    MEDICATIONS  (PRN):  haloperidol    Injectable 0.5 milliGRAM(s) IV Push every 12 hours PRN Agitation    Vital Signs Last 24 Hrs  T(C): 36.4 (27 Jul 2020 13:00), Max: 37 (26 Jul 2020 17:00)  T(F): 97.6 (27 Jul 2020 13:00), Max: 98.6 (26 Jul 2020 17:00)  HR: 82 (27 Jul 2020 13:00) (78 - 88)  BP: 120/58 (27 Jul 2020 13:00) (120/58 - 145/89)  BP(mean): 105 (26 Jul 2020 20:00) (105 - 105)  RR: 20 (27 Jul 2020 13:00) (18 - 20)  SpO2: 98% (27 Jul 2020 13:00) (95% - 100%)    PHYSICAL EXAM  Constitutional: NAD, Comfortable  HEENT: NCAT, EOMI  Neck: Supple, No limited ROM  Chest: Breathing comfortably, No wheezing  Cardiovascular: S1S2   Abdomen: Soft   Extremities: 1+ LE edema, no calf pain.   Neurologic Exam:                 Keeps eyes closed.  TORREZ x 4   Not following verbal instruction.  + tremor                          8.5    3.46  )-----------( 86       ( 27 Jul 2020 05:26 )             25.4     07-27    138  |  104  |  32<H>  ----------------------------<  121<H>  5.0   |  22  |  1.41<H>    Ca    9.2      27 Jul 2020 05:26  Phos  3.4     07-27  Mg     2.0     07-27    TPro  5.6<L>  /  Alb  3.4  /  TBili  1.0  /  DBili  0.4<H>  /  AST  15  /  ALT  19  /  AlkPhos  113  07-27            ASSESSMENT/PLAN  64y Male with functional deficits after complicated and prolonged hospital course due to decompensated cirrhosis now s/p liver transplant 7/2.   - Continue PT/OT  - Skin- Turn q2h, check skin daily  - DVT px- SCDs  - Monitor K+  - Will f/u rehab needs as clinical course progresses.

## 2020-07-27 NOTE — CHART NOTE - NSCHARTNOTEFT_GEN_A_CORE
Nutrition Follow Up Note     Patient seen for: nutrition follow up s/p Liver Txp 7/2.    Source: PCA, RN, medical record, communication with team. Pt A&O x 0, delirious; ordered for constant observation.    Chart reviewed, events noted. s/p liver transplant 7/2: liver enzymes continue to downtrend    Diet Order: Diet, Consistent Carbohydrate w/Evening Snack:   Low Fat (LOWFAT)  No Concentrated Potassium (07-27-20 @ 09:52)    Nutrition Events:   - PO Intake: Per PCA, pt continues to eat 100% of meals despite delirium  - Hyperglycemia improved; Endocrine is following. Currently managed with Humalog corrective regimen (premeal and bedtime).  - Hyperkalemia: potassium now trending down, currently 5.0; No Concentrated Potassium restriction added to diet order  - Nutrition Education: Unable to reinforce diet education. Previously reviewed post transplant nutrition therapy and food safety guidelines for transplant recipients. RD will continue to reinforce diet education prior to discharge, when pt is alert and receptive.     Last BM 7/26, 7/27    Anthropometric Measurements:   Height (cm): 185 (07-01-20 @ 13:54), 185.42 (04-27-20 @ 14:53)  Weight (kg): 103 (07-01-20 @ 13:54), 117.9 (04-27-20 @ 14:53); 118.2 (07-10-20). 106.2 (07-16-20); 106.4 (07-21-20), 102.1 (07-27-20); weight changes likely reflect fluid shifts  BMI (kg/m2): 30.1 (07-01-20 @ 13:54), 34.3 (04-27-20 @ 14:53)  IBW: 83.4 kg    Medications: MEDICATIONS  (STANDING):  aspirin enteric coated 81 milliGRAM(s) Oral daily  chlorhexidine 2% Cloths 1 Application(s) Topical <User Schedule>  collagenase Ointment 1 Application(s) Topical daily  everolimus (ZORTRESS) 3 milliGRAM(s) Oral <User Schedule>  insulin lispro (HumaLOG) corrective regimen sliding scale   SubCutaneous Before meals and at bedtime  magnesium oxide 800 milliGRAM(s) Oral two times a day with meals  mycophenolate mofetil 750 milliGRAM(s) Oral <User Schedule>  nystatin    Suspension 957793 Unit(s) Oral four times a day  nystatin Cream 1 Application(s) Topical two times a day  pantoprazole    Tablet 40 milliGRAM(s) Oral before breakfast  predniSONE   Tablet 5 milliGRAM(s) Oral daily  tamsulosin 0.8 milliGRAM(s) Oral at bedtime  traZODone 100 milliGRAM(s) Oral at bedtime  trimethoprim   80 mG/sulfamethoxazole 400 mG 1 Tablet(s) Oral daily  valGANciclovir 900 milliGRAM(s) Oral daily    MEDICATIONS  (PRN):  haloperidol    Injectable 0.5 milliGRAM(s) IV Push every 12 hours PRN Agitation    Labs: 07-27 @ 05:26: Sodium 138, Potassium 5.0, Calcium 9.2, Magnesium 2.0, Phosphorus 3.4, BUN 32<H>, Creatinine 1.41<H>, Glucose 121<H>, Alk Phos 113, ALT/SGPT 19, AST/SGOT 15, Albumin 3.4, Total Bilirubin 1.0, Hemoglobin 8.5<L>, Hematocrit 25.4<L>, Creatine Kinase <<27>    HbA1c 5.2%    POCT Blood Glucose.: 130 mg/dL (07-27-20 @ 10:35)  POCT Blood Glucose.: 120 mg/dL (07-27-20 @ 05:44)  POCT Blood Glucose.: 143 mg/dL (07-27-20 @ 01:59)  POCT Blood Glucose.: 147 mg/dL (07-26-20 @ 21:37)  POCT Blood Glucose.: 125 mg/dL (07-26-20 @ 18:24)  POCT Blood Glucose.: 195 mg/dL (07-26-20 @ 14:31)  POCT Blood Glucose.: 121 mg/dL (07-26-20 @ 12:05)    Skin per nursing documentation: no pressure injuries documented  Edema: 1+ generalized    Estimated Needs: based on IBW 83.4 kg   Energy: 4420-8271 calories  (25-30 fahad/kg)  Protein: 100-117 gm (1.2-1.4 gm/kg)    Previous Nutrition Diagnosis: 1) Increased Nutrient Needs 2) Food and Nutrition Related Knowledge Deficit  Nutrition Diagnosis is: ongoing, addressed with therapeutic diet and diet education (when appropriate)    New Nutrition Diagnosis:  none    Recommended Interventions:   1) Continue Consistent Carbohydrate, Low Fat diet. Continue Potassium restriction as warranted.  2) Continue to reinforce post-transplant nutrition therapy and food safety guidelines in-house and prior to discharge when pt is alert and oriented.   3) Discharge diet: Continue as above. Recommend follow up visit with Transplant MD and outpatient RD for dietary modifications as warranted.     Monitoring and Evaluation:   Continue to monitor nutrition provision and tolerance, weights, labs, skin integrity.   RD remains available upon request and will follow up per protocol.    Myah Argueta, MS RD CDN [facility]; Pager # [phone]

## 2020-07-27 NOTE — PROGRESS NOTE ADULT - ASSESSMENT
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 c/b VRE bactermia and delerium    [] POD 25 s/p OLT   - Good graft function, LFTs stable   - Immuno: off tacro as of 7/19. now dc Cyclosporine in the setting of AMS, start everolimus 3mg BID, increase starting tomorrow to 1gm BID will continue MMF 750mg today,  Pred 5mg daily  - PPX: valcyte/nystatin/bactrim  - Continue ASA 81mg daily  - h/o UGI bleed: Protonix 40mg daily   - Carb restricted diet as tolerated  - DC MRI for  now will reasess later when more stable for MRI  - DC NPO order start reg diet  - DC IVF, give one liter NS bolus over 6 hours  - C/WvTrazadone 100mg qhs and Haldol PRN for agitation   - DVT PPx: SCDs at all times  - Sacral decub: seen by  wound care, recs appreciated   - Pain control PRN    [] VRE bacteremia (7/10)  - ID following: S/P dapto.  Stop date 7/26  - Bld cx from 7/20 with NG, urine cx (7/20) with NG     [] Hyperglycemia  - Appreciate endocrinology recommendations  - low blood glucose levels due to AMS and limited oral intake   - DC Lantus and continue to monitor BG with Humalog insulin SSI coverage for now (was previously on Humalog 5U premeal and Lantus 8 units qhs)  - Fingersticks ac and qhs    [] Heel ulcer  - Continue local wound care w/ santyl and DSD as per podiatry    [] Dispo:   - continue 1:1 with [employer]

## 2020-07-27 NOTE — PROGRESS NOTE ADULT - PROBLEM SELECTOR PLAN 1
- s/p Liver transplant  - anti-rejection and post op prophylaxis per transplant hepatology  - pending MRI head for delirium   - diet as tolerated  - greatly appreciate transplant service care and efforts

## 2020-07-27 NOTE — PROGRESS NOTE ADULT - PROBLEM SELECTOR PLAN 3
-BP mostly at goal <130/80, off anti-hypertensives at this time.  Discussed with RN and x-plant OLY Holley MD  [phone]

## 2020-07-27 NOTE — PROGRESS NOTE ADULT - ASSESSMENT
65 yo M with hx of T2DM uncontrolled due to steroids x 10 years with neuropathy and R DFU of the heel, HTN, HLD, MGUS, and decompensated JEAN cirrhosis s/p liver x-plant on 7/2/2020 admitted 6/11/2020 with confusion secondary to liver failure and encephalopathy. Endocrine consulted for glycemic control in the setting of steroid use now on Prednisone 5mg daily with good bs control on correctional scale insulin.

## 2020-07-27 NOTE — PROGRESS NOTE ADULT - ATTENDING COMMENTS
POD#25 s/p OLT  excellent liver function; main issue has been altered mental status and agitation  Immunosuppression - d/c cyclosporine. Start everolimus 3mg bid; cont prednisone 5mg daily; plan to increase cellcept to 1gm bid tomorrow  cont trazodone and haldol prn

## 2020-07-27 NOTE — PROGRESS NOTE ADULT - SUBJECTIVE AND OBJECTIVE BOX
INTERVAL HPI/OVERNIGHT EVENTS:    patient seen and examined this afternoon  agitated, restless, tremulous  no bms this morning  voiding   MRI head pending         MEDICATIONS  (STANDING):  aspirin enteric coated 81 milliGRAM(s) Oral daily  chlorhexidine 2% Cloths 1 Application(s) Topical <User Schedule>  collagenase Ointment 1 Application(s) Topical daily  furosemide    Tablet 80 milliGRAM(s) Oral every 8 hours  insulin glargine Injectable (LANTUS) 16 Unit(s) SubCutaneous at bedtime  insulin lispro (HumaLOG) corrective regimen sliding scale   SubCutaneous three times a day before meals  insulin lispro (HumaLOG) corrective regimen sliding scale   SubCutaneous at bedtime  insulin lispro Injectable (HumaLOG) 10 Unit(s) SubCutaneous before dinner  insulin lispro Injectable (HumaLOG) 8 Unit(s) SubCutaneous before breakfast  insulin lispro Injectable (HumaLOG) 8 Unit(s) SubCutaneous before lunch  linezolid  IVPB 600 milliGRAM(s) IV Intermittent every 12 hours  magnesium oxide 800 milliGRAM(s) Oral two times a day with meals  meropenem  IVPB 1000 milliGRAM(s) IV Intermittent every 8 hours  nystatin    Suspension 452503 Unit(s) Swish and Swallow four times a day  nystatin Cream 1 Application(s) Topical two times a day  pantoprazole  Injectable 40 milliGRAM(s) IV Push every 12 hours  polyethylene glycol 3350 17 Gram(s) Oral daily  predniSONE   Tablet 5 milliGRAM(s) Oral daily  senna 2 Tablet(s) Oral at bedtime  tacrolimus 3 milliGRAM(s) Oral <User Schedule>  tacrolimus 2 milliGRAM(s) Oral <User Schedule>  tamsulosin 0.8 milliGRAM(s) Oral at bedtime  trimethoprim   80 mG/sulfamethoxazole 400 mG 1 Tablet(s) Oral daily  valGANciclovir 450 milliGRAM(s) Oral daily    MEDICATIONS  (PRN):  glucagon  Injectable 1 milliGRAM(s) IntraMuscular once PRN Glucose <70 milliGRAM(s)/deciLiter      Allergies    codeine (Anaphylaxis)    Intolerances        Review of Systems: unable to obtain           Vital Signs Last 24 Hrs  T(C): 36.3 (12 Jul 2020 15:00), Max: 36.8 (11 Jul 2020 19:00)  T(F): 97.3 (12 Jul 2020 15:00), Max: 98.2 (11 Jul 2020 19:00)  HR: 80 (12 Jul 2020 15:00) (72 - 83)  BP: 109/59 (12 Jul 2020 15:00) (94/55 - 154/68)  BP(mean): 78 (12 Jul 2020 15:00) (70 - 125)  RR: 15 (12 Jul 2020 15:00) (9 - 28)  SpO2: 96% (12 Jul 2020 15:00) (96% - 100%)    PHYSICAL EXAM:    Constitutional: restless  HEENT: sclera anicteric   Neck: No LAD, supple  Gastrointestinal: BS+, soft, ND, +incisional tenderness, incisions c/d/i  Extremities: +b/l peripheral edema  Neurological: agitated, tremulous      LABS:                        9.3    5.51  )-----------( 98       ( 12 Jul 2020 01:54 )             28.6     07-12    131<L>  |  95<L>  |  31<H>  ----------------------------<  217<H>  5.0   |  27  |  1.26    Ca    8.1<L>      12 Jul 2020 12:22  Phos  4.0     07-12  Mg     2.0     07-12    TPro  4.4<L>  /  Alb  2.6<L>  /  TBili  1.1  /  DBili  0.5<H>  /  AST  64<H>  /  ALT  98<H>  /  AlkPhos  212<H>  07-12    PT/INR - ( 12 Jul 2020 01:54 )   PT: 12.7 sec;   INR: 1.12 ratio         PTT - ( 12 Jul 2020 01:54 )  PTT:28.7 sec      RADIOLOGY & ADDITIONAL TESTS:

## 2020-07-27 NOTE — PROGRESS NOTE ADULT - ATTENDING COMMENTS
Hepatology Staff: Yomi Medina MD    I saw and examined the patient along with  Dr. Singh 07-27-20 @ 10:15/    Patient Medical Record, hosptial course was reviewed and summarized as below:    Vitals: Vital Signs Last 24 Hrs  T(C): 36.3 (27 Jul 2020 09:00), Max: 37 (26 Jul 2020 17:00)  T(F): 97.3 (27 Jul 2020 09:00), Max: 98.6 (26 Jul 2020 17:00)  HR: 78 (27 Jul 2020 09:00) (78 - 88)  BP: 145/89 (27 Jul 2020 09:00) (132/61 - 145/89)  BP(mean): 105 (26 Jul 2020 20:00) (105 - 105)  RR: 20 (27 Jul 2020 09:00) (18 - 20)  SpO2: 98% (27 Jul 2020 09:00) (95% - 100%)    Labs:Creatinine, Serum: 1.41 mg/dL (07-27-20 @ 05:26)  Bilirubin Total, Serum: 1.0 mg/dL (07-27-20 @ 05:26)  INR: 1.12 ratio (07-27-20 @ 05:26)      I/O: I&O's Summary    26 Jul 2020 07:01  -  27 Jul 2020 07:00  --------------------------------------------------------  IN: 530 mL / OUT: 750 mL / NET: -220 mL      Nutritional Status:   Albumin, Serum: 3.4 g/dL (07-27-20 @ 05:26)    Recommendations: Patient is still delirious despite being on Trazadone, and haldol while being treated with Cyclosporine. Discussed in the multidisciplinary round. Plan is to switch to Everolimus, and will cont with Cellcept.  Keep on Trazadone 100 mg daily.    Plan discussed with Primary team.

## 2020-07-27 NOTE — PROGRESS NOTE ADULT - SUBJECTIVE AND OBJECTIVE BOX
Chief Complaint/Follow-up on:     Subjective:    MEDICATIONS  (STANDING):  aspirin enteric coated 81 milliGRAM(s) Oral daily  chlorhexidine 2% Cloths 1 Application(s) Topical <User Schedule>  collagenase Ointment 1 Application(s) Topical daily  everolimus (ZORTRESS) 2 milliGRAM(s) Oral <User Schedule>  insulin lispro (HumaLOG) corrective regimen sliding scale   SubCutaneous Before meals and at bedtime  magnesium oxide 800 milliGRAM(s) Oral two times a day with meals  mycophenolate mofetil 750 milliGRAM(s) Oral <User Schedule>  nystatin    Suspension 218292 Unit(s) Oral four times a day  nystatin Cream 1 Application(s) Topical two times a day  pantoprazole    Tablet 40 milliGRAM(s) Oral before breakfast  predniSONE   Tablet 5 milliGRAM(s) Oral daily  tamsulosin 0.8 milliGRAM(s) Oral at bedtime  traZODone 100 milliGRAM(s) Oral at bedtime  trimethoprim   80 mG/sulfamethoxazole 400 mG 1 Tablet(s) Oral daily  valGANciclovir 900 milliGRAM(s) Oral daily    MEDICATIONS  (PRN):  haloperidol    Injectable 0.5 milliGRAM(s) IV Push every 12 hours PRN Agitation      PHYSICAL EXAM:  VITALS: T(C): 36.4 (07-27-20 @ 13:00)  T(F): 97.6 (07-27-20 @ 13:00), Max: 98.6 (07-26-20 @ 17:00)  HR: 82 (07-27-20 @ 13:00) (78 - 88)  BP: 120/58 (07-27-20 @ 13:00) (120/58 - 145/89)  RR:  (18 - 20)  SpO2:  (95% - 100%)  Wt(kg): --  GENERAL: NAD, well-groomed, well-developed  EYES: No proptosis, no injection  HEENT:  Atraumatic, Normocephalic, moist mucous membranes  THYROID: Normal size, no palpable nodules  RESPIRATORY: Clear to auscultation bilaterally; No rales, rhonchi, wheezing, or rubs  CARDIOVASCULAR: Regular rate and rhythm; No murmurs; no peripheral edema  GI: Soft, nontender, non distended, normal bowel sounds  CUSHING'S SIGNS: no striae    POCT Blood Glucose.: 213 mg/dL (07-27-20 @ 13:07)  POCT Blood Glucose.: 130 mg/dL (07-27-20 @ 10:35)  POCT Blood Glucose.: 120 mg/dL (07-27-20 @ 05:44)  POCT Blood Glucose.: 143 mg/dL (07-27-20 @ 01:59)  POCT Blood Glucose.: 147 mg/dL (07-26-20 @ 21:37)  POCT Blood Glucose.: 125 mg/dL (07-26-20 @ 18:24)  POCT Blood Glucose.: 195 mg/dL (07-26-20 @ 14:31)  POCT Blood Glucose.: 121 mg/dL (07-26-20 @ 12:05)  POCT Blood Glucose.: 101 mg/dL (07-26-20 @ 09:02)  POCT Blood Glucose.: 103 mg/dL (07-25-20 @ 20:57)  POCT Blood Glucose.: 88 mg/dL (07-25-20 @ 19:12)  POCT Blood Glucose.: 166 mg/dL (07-25-20 @ 14:21)  POCT Blood Glucose.: 112 mg/dL (07-25-20 @ 09:30)  POCT Blood Glucose.: 101 mg/dL (07-24-20 @ 21:36)  POCT Blood Glucose.: 105 mg/dL (07-24-20 @ 17:41)    07-27    138  |  104  |  32<H>  ----------------------------<  121<H>  5.0   |  22  |  1.41<H>    EGFR if : 61  EGFR if non : 52<L>    Ca    9.2      07-27  Mg     2.0     07-27  Phos  3.4     07-27    TPro  5.6<L>  /  Alb  3.4  /  TBili  1.0  /  DBili  0.4<H>  /  AST  15  /  ALT  19  /  AlkPhos  113  07-27          Thyroid Function Tests: Chief Complaint/Follow-up on: T2D    Subjective: Patient eating well and bs are well controlled with just correctional scale, only one bs over 180 in the past 24 hours.    MEDICATIONS  (STANDING):  aspirin enteric coated 81 milliGRAM(s) Oral daily  chlorhexidine 2% Cloths 1 Application(s) Topical <User Schedule>  collagenase Ointment 1 Application(s) Topical daily  everolimus (ZORTRESS) 2 milliGRAM(s) Oral <User Schedule>  insulin lispro (HumaLOG) corrective regimen sliding scale   SubCutaneous Before meals and at bedtime  magnesium oxide 800 milliGRAM(s) Oral two times a day with meals  mycophenolate mofetil 750 milliGRAM(s) Oral <User Schedule>  nystatin    Suspension 625193 Unit(s) Oral four times a day  nystatin Cream 1 Application(s) Topical two times a day  pantoprazole    Tablet 40 milliGRAM(s) Oral before breakfast  predniSONE   Tablet 5 milliGRAM(s) Oral daily  tamsulosin 0.8 milliGRAM(s) Oral at bedtime  traZODone 100 milliGRAM(s) Oral at bedtime  trimethoprim   80 mG/sulfamethoxazole 400 mG 1 Tablet(s) Oral daily  valGANciclovir 900 milliGRAM(s) Oral daily    MEDICATIONS  (PRN):  haloperidol    Injectable 0.5 milliGRAM(s) IV Push every 12 hours PRN Agitation      PHYSICAL EXAM:  VITALS: T(C): 36.4 (07-27-20 @ 13:00)  T(F): 97.6 (07-27-20 @ 13:00), Max: 98.6 (07-26-20 @ 17:00)  HR: 82 (07-27-20 @ 13:00) (78 - 88)  BP: 120/58 (07-27-20 @ 13:00) (120/58 - 145/89)  RR:  (18 - 20)  SpO2:  (95% - 100%)  Wt(kg): --  GENERAL: NAD, well-groomed, well-developed  HEENT:  Atraumatic, Normocephalic, moist mucous membranes  RESPIRATORY: Clear to auscultation bilaterally; No rales, rhonchi, wheezing, or rubs  CARDIOVASCULAR: Regular rate and rhythm; No murmurs; no peripheral edema  GI: Soft, nontender, non distended, normal bowel sounds      POCT Blood Glucose.: 213 mg/dL (07-27-20 @ 13:07)  POCT Blood Glucose.: 130 mg/dL (07-27-20 @ 10:35)  POCT Blood Glucose.: 120 mg/dL (07-27-20 @ 05:44)  POCT Blood Glucose.: 143 mg/dL (07-27-20 @ 01:59)  POCT Blood Glucose.: 147 mg/dL (07-26-20 @ 21:37)  POCT Blood Glucose.: 125 mg/dL (07-26-20 @ 18:24)  POCT Blood Glucose.: 195 mg/dL (07-26-20 @ 14:31)  POCT Blood Glucose.: 121 mg/dL (07-26-20 @ 12:05)  POCT Blood Glucose.: 101 mg/dL (07-26-20 @ 09:02)  POCT Blood Glucose.: 103 mg/dL (07-25-20 @ 20:57)  POCT Blood Glucose.: 88 mg/dL (07-25-20 @ 19:12)  POCT Blood Glucose.: 166 mg/dL (07-25-20 @ 14:21)  POCT Blood Glucose.: 112 mg/dL (07-25-20 @ 09:30)  POCT Blood Glucose.: 101 mg/dL (07-24-20 @ 21:36)  POCT Blood Glucose.: 105 mg/dL (07-24-20 @ 17:41)    07-27    138  |  104  |  32<H>  ----------------------------<  121<H>  5.0   |  22  |  1.41<H>    EGFR if : 61  EGFR if non : 52<L>    Ca    9.2      07-27  Mg     2.0     07-27  Phos  3.4     07-27    TPro  5.6<L>  /  Alb  3.4  /  TBili  1.0  /  DBili  0.4<H>  /  AST  15  /  ALT  19  /  AlkPhos  113  07-27

## 2020-07-27 NOTE — PROGRESS NOTE ADULT - SUBJECTIVE AND OBJECTIVE BOX
Transplant Surgery Progress Note    OLTx      Date:  7/2/2020       POD#25    Present:   Patient seen with multidisciplinary team including Transplant Surgeon: Dr. Chris, Dr. Lucio, Dr. Gray, Dr. Garcia, Dr. Ackerman. Transplant Nephrologist Dr. Schwarz, renal fellow, NP Patrick, OLY Turcios student, PGY 3 Dr. Dickinson, and unit RN and RN manager and examined with Dr. Garcia during am rounds.  Disciplines not in attendance will be notified of the plan.     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     Re-admitted on 6/11:   ·	worsening HE  ·	UTI/VRE: tx with linezolid 6/15-22, bactrim DS 6/22 - 7/2  ·	MISA/Hyponatremia  ·	UGI bleed (melena) s/p EGD (6/22) c/w hemorrhagic duodenitis/gastritis; Grade 2 EV; requiring multiple transfusions  ·	Known h/o R foot ulcer (MRI neg for OM)  ·	s/p OLTx on 7/2/2020, post op liver doppler with patent vessels  ·	post op course c/b delirium and VRE bacteremia (7/10)     Interval events:  - POD 25 s/p OLT with good graft function;  LFTs stable  - Trazadone increased to 100mg @HS for agitation  - Mental status unchanged this AM: confused, + tremors, restless  - MRI brain with sedation to be given by anesthesia ordered  - tolerating PO   - Afebrile.  WBC 3.46 S/P Daptomycin for VRE bacteremia.  7/20 BCx and UCx ngtd    Potential Discharge date: pending clinical improvement     Education:  Medications    Plan of care:  See Below         MEDICATIONS  (STANDING):  aspirin enteric coated 81 milliGRAM(s) Oral daily  chlorhexidine 2% Cloths 1 Application(s) Topical <User Schedule>  collagenase Ointment 1 Application(s) Topical daily  everolimus (ZORTRESS) 3 milliGRAM(s) Oral <User Schedule>  insulin lispro (HumaLOG) corrective regimen sliding scale   SubCutaneous Before meals and at bedtime  magnesium oxide 800 milliGRAM(s) Oral two times a day with meals  mycophenolate mofetil 750 milliGRAM(s) Oral <User Schedule>  nystatin    Suspension 972041 Unit(s) Oral four times a day  nystatin Cream 1 Application(s) Topical two times a day  pantoprazole    Tablet 40 milliGRAM(s) Oral before breakfast  predniSONE   Tablet 5 milliGRAM(s) Oral daily  tamsulosin 0.8 milliGRAM(s) Oral at bedtime  traZODone 100 milliGRAM(s) Oral at bedtime  trimethoprim   80 mG/sulfamethoxazole 400 mG 1 Tablet(s) Oral daily  valGANciclovir 900 milliGRAM(s) Oral daily    MEDICATIONS  (PRN):  haloperidol    Injectable 0.5 milliGRAM(s) IV Push every 12 hours PRN Agitation      PAST MEDICAL & SURGICAL HISTORY:  GIB (gastrointestinal bleeding)  GERD with esophagitis: Gastritis &amp; Non Bleeding Ulcers  Hepatic encephalopathy  Obesity  Fatty liver disease, nonalcoholic  Renal stones: 25 years ago  Hypertension  Neuropathy  Hypercholesteremia  Diabetes  S/P cholecystectomy      Vital Signs Last 24 Hrs  T(C): 36.3 (27 Jul 2020 09:00), Max: 37 (26 Jul 2020 17:00)  T(F): 97.3 (27 Jul 2020 09:00), Max: 98.6 (26 Jul 2020 17:00)  HR: 78 (27 Jul 2020 09:00) (78 - 88)  BP: 145/89 (27 Jul 2020 09:00) (132/61 - 145/89)  BP(mean): 105 (26 Jul 2020 20:00) (105 - 105)  RR: 20 (27 Jul 2020 09:00) (18 - 20)  SpO2: 98% (27 Jul 2020 09:00) (95% - 100%)    I&O's Summary    26 Jul 2020 07:01  -  27 Jul 2020 07:00  --------------------------------------------------------  IN: 530 mL / OUT: 750 mL / NET: -220 mL    27 Jul 2020 07:01  -  27 Jul 2020 12:18  --------------------------------------------------------  IN: 240 mL / OUT: 0 mL / NET: 240 mL                              8.5    3.46  )-----------( 86       ( 27 Jul 2020 05:26 )             25.4     07-27    138  |  104  |  32<H>  ----------------------------<  121<H>  5.0   |  22  |  1.41<H>    Ca    9.2      27 Jul 2020 05:26  Phos  3.4     07-27  Mg     2.0     07-27    TPro  5.6<L>  /  Alb  3.4  /  TBili  1.0  /  DBili  0.4<H>  /  AST  15  /  ALT  19  /  AlkPhos  113  07-27          Culture - Blood (collected 07-20-20 @ 23:18)  Source: .Blood Blood  Final Report (07-26-20 @ 01:01):    No Growth Final    Culture - Blood (collected 07-20-20 @ 23:18)  Source: .Blood Blood  Final Report (07-26-20 @ 01:01):    No Growth Final    Culture - Urine (collected 07-20-20 @ 15:23)  Source: .Urine Clean Catch (Midstream)  Final Report (07-21-20 @ 13:20):    No growth    ROS  Unable to obtain due to AMS    Physical Exam  Constitutional: AMS, + tremors  Eyes: Anicteric, PERRLA  ENMT: nc/at  Neck: no central line, no JVD  Respiratory: CTA B/L, unlabored breathing   Cardiovascular: RRR  Gastrointestinal: soft, NT, ND, incision c/d/i, T-tube tied   Genitourinary: Voiding spontaneously  Extremities: heel wound without drainage or purulent material, edema improving   Vascular: Palpable dp pulses bilaterally  Neurological: Confused, restless, +tremors, TORREZ  Skin: no rashes  Musculoskeletal: Moving all extremities  Psychiatric: Responsive

## 2020-07-27 NOTE — PROGRESS NOTE ADULT - SUBJECTIVE AND OBJECTIVE BOX
Chief Complaint:  Patient is a 64y old  Male who presents with a chief complaint of Liver failure, hepatic encephalopathy (2020 12:11)      Interval Events: Patient with ongoing delirium Less agitated but still has shakes and confusion    Allergies:  codeine (Anaphylaxis)      Hospital Medications:  aspirin enteric coated 81 milliGRAM(s) Oral daily  chlorhexidine 2% Cloths 1 Application(s) Topical <User Schedule>  collagenase Ointment 1 Application(s) Topical daily  everolimus (ZORTRESS) 2 milliGRAM(s) Oral <User Schedule>  haloperidol    Injectable 0.5 milliGRAM(s) IV Push every 12 hours PRN  insulin lispro (HumaLOG) corrective regimen sliding scale   SubCutaneous Before meals and at bedtime  magnesium oxide 800 milliGRAM(s) Oral two times a day with meals  mycophenolate mofetil 750 milliGRAM(s) Oral <User Schedule>  nystatin    Suspension 319593 Unit(s) Oral four times a day  nystatin Cream 1 Application(s) Topical two times a day  pantoprazole    Tablet 40 milliGRAM(s) Oral before breakfast  predniSONE   Tablet 5 milliGRAM(s) Oral daily  tamsulosin 0.8 milliGRAM(s) Oral at bedtime  traZODone 100 milliGRAM(s) Oral at bedtime  trimethoprim   80 mG/sulfamethoxazole 400 mG 1 Tablet(s) Oral daily  valGANciclovir 900 milliGRAM(s) Oral daily      PMHX/PSHX:  GIB (gastrointestinal bleeding)  GERD with esophagitis  Hepatic encephalopathy  Obesity  Fatty liver disease, nonalcoholic  Renal stones  Hypertension  Neuropathy  Hypercholesteremia  Diabetes  S/P cholecystectomy  No significant past surgical history      Family history:  Family history of type 2 diabetes mellitus  Family history of hypertension  Family history of stomach cancer  No pertinent family history in first degree relatives      ROS: As per HPI, 14-point ROS negative otherwise.        PHYSICAL EXAM:     Vital Signs:  Vital Signs Last 24 Hrs  T(C): 36.3 (2020 09:00), Max: 37 (2020 17:00)  T(F): 97.3 (2020 09:00), Max: 98.6 (2020 17:00)  HR: 78 (2020 09:00) (78 - 88)  BP: 145/89 (2020 09:00) (132/61 - 145/89)  BP(mean): 105 (2020 20:00) (105 - 105)  RR: 20 (2020 09:00) (18 - 20)  SpO2: 98% (2020 09:00) (95% - 100%)  Daily     Daily Weight in k.1 (2020 09:45)    GENERAL: appears confused  NEURO: alert, not oriented, + tremors, no asterixis  HEENT: anicteric sclera, no conjunctival pallor appreciated  CHEST: no respiratory distress, no accessory muscle use  CARDIAC: regular rate, rhythm  ABDOMEN: soft, non-tender, non-distended, no rebound or guarding  EXTREMITIES: warm, well perfused, no edema  SKIN: no lesions noted    LABS:                        8.5    3.46  )-----------( 86       ( 2020 05:26 )             25.4     07-27    138  |  104  |  32<H>  ----------------------------<  121<H>  5.0   |  22  |  1.41<H>    Ca    9.2      2020 05:26  Phos  3.4       Mg     2.0     27    TPro  5.6<L>  /  Alb  3.4  /  TBili  1.0  /  DBili  0.4<H>  /  AST  15  /  ALT  19  /  AlkPhos  113  27    LIVER FUNCTIONS - ( 2020 05:26 )  Alb: 3.4 g/dL / Pro: 5.6 g/dL / ALK PHOS: 113 U/L / ALT: 19 U/L / AST: 15 U/L / GGT: x           PT/INR - ( 2020 05:26 )   PT: 13.3 sec;   INR: 1.12 ratio         PTT - ( 2020 05:26 )  PTT:28.5 sec        Imaging:

## 2020-07-27 NOTE — PROGRESS NOTE ADULT - PROBLEM SELECTOR PLAN 1
-test BG AC/HS  -Decrease Lantus 4 units QHS  -pt well controlled on correctional scale, so will sign off at this time. Reconsult us for discharge recs or if bs gets poorly controlled again.   DISPO: insulin requirement has declined greatly so patient may need diet control vs an oral agent.   -Pt can resume f/u with Dr. Mathur or   [facility] [address] [place] [address] [address] [place] [address] [phone].

## 2020-07-27 NOTE — PROGRESS NOTE ADULT - ASSESSMENT
64 M hx of DM, HLD, GERD, HFpEF with mild LV diastolic dysfunction, and decompensated JEAN cirrhosis c/b ascites with hx of SBP, HE, duodenal ulcer, GAVE and duodenal AVM s/p APC (last on 10/11/19), UNOS listed for liver transplantation at [facility]. 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 S/p liver transplant 7/2 transferred from SICU to floor on 7/17/20 now with signs of tacrolimus toxicity and significant hospital delerium.     Impression:  #S/p liver transplant 7/2: liver enzymes continue to downtrend  OR details:  - L groin cutdown for vv bypass   - anastomosis: bicaval end-end/CBD duct-duct/HA & PV end-end, all standard anatomy.    - IOC with good flow  - Simulect induction. 500mg Solumedrol  - JPs x3 with T-Tube  - 14U pRBC, 14U FFP, 10 PLT, 11 CRYO, 500mL returned from cell saver, EBL 5L, UOP 1100mL  Recipient Info:   ABO: A  CMV:  Neg  EBV Positive  Donor:  Donor ID:  [id number] Match: [id number]  Age: 29 ABO:  A1 High Risk:   No COD: Cardiovascular  Anti CMV positive, EBV IgG- positive  HepBcAb-neg, Hepatitis C-DANA- neg, Hepatitis C ab-neg    # Halluciniations/agitation: worse at night. Getting significant chemical sedations. At times combative. Likely secondary to sleep wake cycle disturbances vs. ICU delerium vs. medication induced. On Seroquel, mirtazapine, trazodone, and melatonin at night during hositap course. Olanzapine added PRN per Behavioral Health on 7/18/20. Now limiting meds to avoid further delerium and side effect risks  # Tremors: Involving bilateral UE - concern for CNI toxicity requiring dose adjustments per Transplant Surgery.   # Sacral decubitus ulcer: does not appear infected  # MISA: serum Cr improved again to 1.7. No episodes of hypotension. UOP remains good. Suspect MISA due to overdiuresis vs. tacrolimus toxicity vs. side effect of meropenem vs transient hypotension  # Elevated liver enzymes: Resolved with mildly elevated ALK-P. Possible secondary to DILI vs. sepsis induced from VRE bacteremia.   # VRE on culture from 7/10: Repeat culture 7/12 negative. On linezolid, s/p meropenem. CT showing groin hematoma, s/p aspiration showing likely seroma and low c/f infection per ID.   #GI bleed: Resolved. Hgb stable with no overt bleeding post-transplant. 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 GAVE and PHG likely improved posttransplant.  # Abnormal Chest X-Ray c/f evolving PNA vs atelectasis. No signs of active infection  # Hypertension: Had episode of hypotension. Now BP normal range.   # Lower ext edema: Improved with Lasix. Diuretics discontinued for MISA.   #R posterior heel wound w/ overlying eschar –Local wound care. no signs of infections, XR neg for gas, evaluated by podiatry and ID. MRI w/o contrast limited, but no overt signs of active infection.  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     Recommendation:  - d/c brain MRI for now  - hold cyclosporine and initiate everolimus  - frequent reorientation and visitation   - normalized sleep/wake cycle environment  - haldol as needed, trazodone qHS  - continue prednisone 5 mg PO daily  - continue Cellcept (currently on 750 mg BID) - dosing as per transplant surgery  - abx per ID team  - continue nystatin, Bactrim and Valcyte ppx  - physical therapy daily to get OOB and ambulating  - continue with mag oxide 200 mg BID  - monitor Hb on daily CBC - continue PO PPI BID, monitor bowel movements  - continue to hold diuretics  - trend Cr, monitor for cyclosporine nephrotoxicity  - follow up Wound care recommendations for R heel ulcer and sacral decub  - follow up other Transplant surgery recommendations

## 2020-07-27 NOTE — PROGRESS NOTE ADULT - ASSESSMENT
64M PMH JEAN cirrhosis s/p OLT on 7/2/20  Post-Op Course Complicated by Encephalopathy and Tremors  Blood Cultures (7/10) with VRE  CT C/A/P (7/11) without obvious intraabdominal source.  Hypoxia prior to positive BCx which may be related to sepsis   Had L groin fluid collection which appears to be consistent with seroma - sample obtained for culture  TTE limited but without obvious vegetations  Completed 2 week course of antibiotics from negative cultures for VRE Bacteremia (End Date: 7/26)    Continued encephalopathy and tremor (?Tacrolimus-induced - switched to Cyclosporine)  U/A from 7/20 without pyuria  COVID19 PCR 7/24 negative  Blood Cultures 7/20 NGTD    Encephalitis or infectious etiology seems less likely  Would recommend CNS imaging when able (would likely require sedation) given persistent encephalopathy   If encephalopathy does not improve and CNS imaging unrevealing would pursue LP    I will continue to follow. Please feel free to contact me with any further questions.    Eusebio Pérez M.D.  [facility] Division of Infectious Disease  8AM-5PM: Pager Number [phone]  After Hours (or if no response): Please contact the Infectious Diseases Office at [phone]     The above assessment and plan were discussed with Dr Chris

## 2020-07-27 NOTE — PROGRESS NOTE ADULT - SUBJECTIVE AND OBJECTIVE BOX
Follow Up:  encephalopathy, s/p OLT     Interval History: remains encephalopathic and tremulous. Afebrile. unable to provide additional history.     REVIEW OF SYSTEMS  [ x ] ROS unobtainable because:  encephalopathic   [  ] All other systems negative except as noted below    Constitutional:  [ ] fever [ ] chills  [ ] weight loss  [ ] weakness  Skin:  [ ] rash [ ] phlebitis	  Eyes: [ ] icterus [ ] pain  [ ] discharge	  ENMT: [ ] sore throat  [ ] thrush [ ] ulcers [ ] exudates  Respiratory: [ ] dyspnea [ ] hemoptysis [ ] cough [ ] sputum	  Cardiovascular:  [ ] chest pain [ ] palpitations [ ] edema	  Gastrointestinal:  [ ] nausea [ ] vomiting [ ] diarrhea [ ] constipation [ ] pain	  Genitourinary:  [ ] dysuria [ ] frequency [ ] hematuria [ ] discharge [ ] flank pain  [ ] incontinence  Musculoskeletal:  [ ] myalgias [ ] arthralgias [ ] arthritis  [ ] back pain  Neurological:  [ ] headache [ ] seizures  [ ] confusion/altered mental status    Allergies  codeine (Anaphylaxis)        ANTIMICROBIALS:  nystatin    Suspension 225098 four times a day  trimethoprim   80 mG/sulfamethoxazole 400 mG 1 daily  valGANciclovir 900 daily      OTHER MEDS:  MEDICATIONS  (STANDING):  aspirin enteric coated 81 daily  everolimus (ZORTRESS) 2 <User Schedule>  haloperidol    Injectable 0.5 every 12 hours PRN  insulin lispro (HumaLOG) corrective regimen sliding scale  Before meals and at bedtime  mycophenolate mofetil 750 <User Schedule>  pantoprazole    Tablet 40 before breakfast  predniSONE   Tablet 5 daily  tamsulosin 0.8 at bedtime  traZODone 100 at bedtime      Vital Signs Last 24 Hrs  T(C): 36.4 (27 Jul 2020 13:00), Max: 37 (26 Jul 2020 17:00)  T(F): 97.6 (27 Jul 2020 13:00), Max: 98.6 (26 Jul 2020 17:00)  HR: 82 (27 Jul 2020 13:00) (78 - 88)  BP: 120/58 (27 Jul 2020 13:00) (120/58 - 145/89)  BP(mean): 105 (26 Jul 2020 20:00) (105 - 105)  RR: 20 (27 Jul 2020 13:00) (18 - 20)  SpO2: 98% (27 Jul 2020 13:00) (95% - 100%)    PHYSICAL EXAMINATION:  General: Awake but not alert, Agitated, Upper extremity tremor   HEENT: PERRL, EOMI  Neck: Supple  Cardiac: RRR, No M/R/G  Resp: CTAB, No Wh/Rh/Ra  Abdomen: OLT surgical site is clean (No surrounding erythema, drainage or tenderness to palpation). L Groin with staples (No surrounding erythema, drainage. some underlying fluctuance still present which is consistent with prior seroma)NBS, NT/ND, No HSM, No rigidity or guarding  MSK: +R Heel with overlying dressing, No LE edema. No Calf tenderness  : No bob  Skin: No rashes or lesions. Skin is warm and dry to the touch.   Neuro: Awake but not alert, Agitated, Upper extremity tremor   Psych: Encephalopathic - unable to assess                          8.5    3.46  )-----------( 86       ( 27 Jul 2020 05:26 )             25.4       07-27    138  |  104  |  32<H>  ----------------------------<  121<H>  5.0   |  22  |  1.41<H>    Ca    9.2      27 Jul 2020 05:26  Phos  3.4     07-27  Mg     2.0     07-27    TPro  5.6<L>  /  Alb  3.4  /  TBili  1.0  /  DBili  0.4<H>  /  AST  15  /  ALT  19  /  AlkPhos  113  07-27          MICROBIOLOGY:  v  .Blood Blood  07-20-20   No Growth Final  --  --      .Urine Clean Catch (Midstream)  07-20-20   No growth  --  --      .Body Fluid Abdominal Fluid  07-14-20   No growth at 5 days  --    polymorphonuclear leukocytes seen  No organisms seen  by cytocentrifuge      .Blood Blood-Peripheral  07-12-20   No Growth Final  --  --      .Urine Catheterized  07-11-20   No growth  --  --      .Blood Blood-Peripheral  07-10-20   Growth in aerobic bottle: Enterococcus faecium (vancomycin resistant)  "Due to technical problems, Proteus sp. will Not be reported as part of  the BCID panel until further notice"  ***Blood Panel PCR results on this specimen are available  approximately 3 hours after the Gram stain result.***  Gram stain, PCR, and/or culture results may not always  correspond due to difference in methodologies.  ************************************************************  This PCR assay was performed using [employer].  The following targets are tested for: Enterococcus,  vancomycin resistant enterococci, Listeria monocytogenes,  coagulase negative staphylococci, S. aureus,  methicillin resistant S. aureus, Streptococcus agalactiae  (Group B), S. pneumoniae, S. pyogenes (Group A),  Acinetobacter baumannii, Enterobacter cloacae, E. coli,  Klebsiella oxytoca, K. pneumoniae, Proteus sp.,  Serratia marcescens, Haemophilus influenzae,  Neisseria meningitidis, Pseudomonas aeruginosa, Candida  albicans, C. glabrata, C krusei, C parapsilosis,  C. tropicalis and the KPC resistance gene.  --  Blood Culture PCR  Enterococcus faecium (vancomycin resistant)      .Blood Blood  07-03-20   No Growth Final  --  --      .Urine Catheterized  07-03-20   No growth  --  --      .Other Other  07-03-20   No growth  --  --        CMV IgG Antibody: <0.20 U/mL (12-04-19 @ 08:33)  Toxoplasma IgG Screen: <3.0 IU/mL (12-04-19 @ 08:33)          RADIOLOGY:    <The imaging below has been reviewed and visualized by me independently. Findings as detailed in report below>    EXAM:  XR CHEST PORTABLE URGENT 1V                        PROCEDURE DATE:  07/23/2020    Study is limited by low lung volumes and by patient positioning.  Difficult to assess heart size.  Thoracic aortic atheromatous changes and ectasia are stable.  No bilateral focal consolidations.  No large pleural effusion. No pneumothorax.  No acute bony finding.

## 2020-07-28 LAB
ALBUMIN SERPL ELPH-MCNC: 3.1 G/DL — LOW (ref 3.3–5)
ALP SERPL-CCNC: 104 U/L — SIGNIFICANT CHANGE UP (ref 40–120)
ALT FLD-CCNC: 16 U/L — SIGNIFICANT CHANGE UP (ref 10–45)
ANION GAP SERPL CALC-SCNC: 10 MMOL/L — SIGNIFICANT CHANGE UP (ref 5–17)
APTT BLD: 29.3 SEC — SIGNIFICANT CHANGE UP (ref 27.5–35.5)
AST SERPL-CCNC: 15 U/L — SIGNIFICANT CHANGE UP (ref 10–40)
BILIRUB DIRECT SERPL-MCNC: 0.3 MG/DL — HIGH (ref 0–0.2)
BILIRUB INDIRECT FLD-MCNC: 0.4 MG/DL — SIGNIFICANT CHANGE UP (ref 0.2–1)
BILIRUB SERPL-MCNC: 0.7 MG/DL — SIGNIFICANT CHANGE UP (ref 0.2–1.2)
BLD GP AB SCN SERPL QL: NEGATIVE — SIGNIFICANT CHANGE UP
BUN SERPL-MCNC: 27 MG/DL — HIGH (ref 7–23)
CALCIUM SERPL-MCNC: 9.1 MG/DL — SIGNIFICANT CHANGE UP (ref 8.4–10.5)
CHLORIDE SERPL-SCNC: 107 MMOL/L — SIGNIFICANT CHANGE UP (ref 96–108)
CO2 SERPL-SCNC: 23 MMOL/L — SIGNIFICANT CHANGE UP (ref 22–31)
CREAT SERPL-MCNC: 1.31 MG/DL — HIGH (ref 0.5–1.3)
CYCLOSPORINE SER-MCNC: 116 NG/ML — LOW (ref 150–400)
GLUCOSE BLDC GLUCOMTR-MCNC: 124 MG/DL — HIGH (ref 70–99)
GLUCOSE BLDC GLUCOMTR-MCNC: 132 MG/DL — HIGH (ref 70–99)
GLUCOSE BLDC GLUCOMTR-MCNC: 138 MG/DL — HIGH (ref 70–99)
GLUCOSE BLDC GLUCOMTR-MCNC: 162 MG/DL — HIGH (ref 70–99)
GLUCOSE SERPL-MCNC: 101 MG/DL — HIGH (ref 70–99)
HCT VFR BLD CALC: 22.6 % — LOW (ref 39–50)
HCT VFR BLD CALC: 25.5 % — LOW (ref 39–50)
HGB BLD-MCNC: 7.7 G/DL — LOW (ref 13–17)
HGB BLD-MCNC: 8.6 G/DL — LOW (ref 13–17)
INR BLD: 1.07 RATIO — SIGNIFICANT CHANGE UP (ref 0.88–1.16)
MAGNESIUM SERPL-MCNC: 1.8 MG/DL — SIGNIFICANT CHANGE UP (ref 1.6–2.6)
MCHC RBC-ENTMCNC: 32.1 PG — SIGNIFICANT CHANGE UP (ref 27–34)
MCHC RBC-ENTMCNC: 32.8 PG — SIGNIFICANT CHANGE UP (ref 27–34)
MCHC RBC-ENTMCNC: 33.7 GM/DL — SIGNIFICANT CHANGE UP (ref 32–36)
MCHC RBC-ENTMCNC: 34.1 GM/DL — SIGNIFICANT CHANGE UP (ref 32–36)
MCV RBC AUTO: 95.1 FL — SIGNIFICANT CHANGE UP (ref 80–100)
MCV RBC AUTO: 96.2 FL — SIGNIFICANT CHANGE UP (ref 80–100)
NRBC # BLD: 0 /100 WBCS — SIGNIFICANT CHANGE UP (ref 0–0)
NRBC # BLD: 0 /100 WBCS — SIGNIFICANT CHANGE UP (ref 0–0)
PHOSPHATE SERPL-MCNC: 3.2 MG/DL — SIGNIFICANT CHANGE UP (ref 2.5–4.5)
PLATELET # BLD AUTO: 108 K/UL — LOW (ref 150–400)
PLATELET # BLD AUTO: 114 K/UL — LOW (ref 150–400)
POTASSIUM SERPL-MCNC: 5 MMOL/L — SIGNIFICANT CHANGE UP (ref 3.5–5.3)
POTASSIUM SERPL-SCNC: 5 MMOL/L — SIGNIFICANT CHANGE UP (ref 3.5–5.3)
PROCALCITONIN SERPL-MCNC: 0.21 NG/ML — HIGH (ref 0.02–0.1)
PROT SERPL-MCNC: 5.3 G/DL — LOW (ref 6–8.3)
PROTHROM AB SERPL-ACNC: 12.7 SEC — SIGNIFICANT CHANGE UP (ref 10.6–13.6)
RBC # BLD: 2.35 M/UL — LOW (ref 4.2–5.8)
RBC # BLD: 2.68 M/UL — LOW (ref 4.2–5.8)
RBC # FLD: 17.2 % — HIGH (ref 10.3–14.5)
RBC # FLD: 17.9 % — HIGH (ref 10.3–14.5)
RH IG SCN BLD-IMP: POSITIVE — SIGNIFICANT CHANGE UP
SODIUM SERPL-SCNC: 140 MMOL/L — SIGNIFICANT CHANGE UP (ref 135–145)
TACROLIMUS SERPL-MCNC: <2 NG/ML — SIGNIFICANT CHANGE UP
WBC # BLD: 3.92 K/UL — SIGNIFICANT CHANGE UP (ref 3.8–10.5)
WBC # BLD: 4.13 K/UL — SIGNIFICANT CHANGE UP (ref 3.8–10.5)
WBC # FLD AUTO: 3.92 K/UL — SIGNIFICANT CHANGE UP (ref 3.8–10.5)
WBC # FLD AUTO: 4.13 K/UL — SIGNIFICANT CHANGE UP (ref 3.8–10.5)

## 2020-07-28 PROCEDURE — 99232 SBSQ HOSP IP/OBS MODERATE 35: CPT

## 2020-07-28 PROCEDURE — 99232 SBSQ HOSP IP/OBS MODERATE 35: CPT | Mod: GC,24

## 2020-07-28 RX ORDER — TRAZODONE HCL 50 MG
50 TABLET ORAL AT BEDTIME
Refills: 0 | Status: DISCONTINUED | OUTPATIENT
Start: 2020-07-28 | End: 2020-07-30

## 2020-07-28 RX ORDER — ALBUMIN HUMAN 25 %
100 VIAL (ML) INTRAVENOUS EVERY 8 HOURS
Refills: 0 | Status: COMPLETED | OUTPATIENT
Start: 2020-07-28 | End: 2020-07-29

## 2020-07-28 RX ORDER — MYCOPHENOLATE MOFETIL 250 MG/1
750 CAPSULE ORAL
Refills: 0 | Status: DISCONTINUED | OUTPATIENT
Start: 2020-07-28 | End: 2020-08-09

## 2020-07-28 RX ADMIN — Medication 50 MILLILITER(S): at 16:38

## 2020-07-28 RX ADMIN — Medication 500000 UNIT(S): at 05:38

## 2020-07-28 RX ADMIN — Medication 1: at 12:25

## 2020-07-28 RX ADMIN — EVEROLIMUS 2 MILLIGRAM(S): 10 TABLET ORAL at 20:18

## 2020-07-28 RX ADMIN — Medication 1 TABLET(S): at 11:33

## 2020-07-28 RX ADMIN — Medication 50 MILLIGRAM(S): at 20:28

## 2020-07-28 RX ADMIN — NYSTATIN CREAM 1 APPLICATION(S): 100000 CREAM TOPICAL at 05:38

## 2020-07-28 RX ADMIN — Medication 81 MILLIGRAM(S): at 11:33

## 2020-07-28 RX ADMIN — Medication 50 MILLILITER(S): at 23:33

## 2020-07-28 RX ADMIN — MAGNESIUM OXIDE 400 MG ORAL TABLET 800 MILLIGRAM(S): 241.3 TABLET ORAL at 17:27

## 2020-07-28 RX ADMIN — Medication 1 APPLICATION(S): at 11:32

## 2020-07-28 RX ADMIN — MYCOPHENOLATE MOFETIL 750 MILLIGRAM(S): 250 CAPSULE ORAL at 20:18

## 2020-07-28 RX ADMIN — CHLORHEXIDINE GLUCONATE 1 APPLICATION(S): 213 SOLUTION TOPICAL at 08:13

## 2020-07-28 RX ADMIN — Medication 500000 UNIT(S): at 17:27

## 2020-07-28 RX ADMIN — VALGANCICLOVIR 900 MILLIGRAM(S): 450 TABLET, FILM COATED ORAL at 11:33

## 2020-07-28 RX ADMIN — Medication 500000 UNIT(S): at 11:33

## 2020-07-28 RX ADMIN — MAGNESIUM OXIDE 400 MG ORAL TABLET 800 MILLIGRAM(S): 241.3 TABLET ORAL at 08:16

## 2020-07-28 RX ADMIN — TAMSULOSIN HYDROCHLORIDE 0.8 MILLIGRAM(S): 0.4 CAPSULE ORAL at 20:28

## 2020-07-28 RX ADMIN — PANTOPRAZOLE SODIUM 40 MILLIGRAM(S): 20 TABLET, DELAYED RELEASE ORAL at 05:38

## 2020-07-28 RX ADMIN — MYCOPHENOLATE MOFETIL 1000 MILLIGRAM(S): 250 CAPSULE ORAL at 08:21

## 2020-07-28 RX ADMIN — EVEROLIMUS 2 MILLIGRAM(S): 10 TABLET ORAL at 08:16

## 2020-07-28 RX ADMIN — NYSTATIN CREAM 1 APPLICATION(S): 100000 CREAM TOPICAL at 17:26

## 2020-07-28 NOTE — PROGRESS NOTE ADULT - PROBLEM SELECTOR PLAN 1
- s/p Liver transplant 07/02  - anti-rejection and post op prophylaxis per transplant hepatology  - diet as tolerated  - greatly appreciate transplant service care and efforts

## 2020-07-28 NOTE — PROGRESS NOTE ADULT - ASSESSMENT
65 y/o M PMHx decompensated JEAN Cirrhosis on transplant list, DMII, HFpEF, recent admission for ESBL E coli prostatic abscess 2/2 4 wks ertapenem, hx of ESBL UTIs with prostate abscess s/p IV abx, recent VRE urinary colonization came to hospital for worsening jaundice and episode of confusion, resolved PTA. s/p treatment course for possible VRE UTI. Now s/p OLT on 7/2/20. Course complicated by Encephalopathy and Tremors. Blood Cultures (7/10) with VRE sensitive to Linezolid. CT C/A/P without obvious intraabdominal source. Hypoxia prior to positive BCx which may be related to sepsis CT Chest (7/11) without evidence of pneumonia. Had L groin fluid collection which appears to be consistent with seroma - sample obtained for culture also negative. No drainage, erythema or underlying fluctuance was appreciated at the right heel. MRI prior to transplant without obvious infection. Transferred from SICU to [facility] on 7/17/2020.    Currently on:  Daptomycin 800mg q24h  nystatin    Suspension [id number] four times a day  trimethoprim   80 mG/sulfamethoxazole 400 mG 1 daily  valGANciclovir 900 daily    #VRE Bacteremia, L Groin Collection, Encephalopathy  - s/p Linezolid and Daptomycin on 7/26 for VRE bacteremia post-OLT  - Abd US showed 9cm Left groin collection: likely represents a seroma or lymphocele; aspirated - cx: PMN seen, no organism seen  - Continue monitoring cylosporine levels  - TTE negative for endocarditis  - UA/UC on 7/20 no UTI  - BCx on 7/20 NGTD  - Doubt if he has true UTI or prostate infection and his UC on 7/20 also no growth  - Would recommend CNS imaging given persistent encephalopathy   - If encephalopathy does not improve and CNS imaging unrevealing would pursue LP    #Hypoxic Respiratory Failure, Encephalopathy  - No evidence of pneumonia and collection in left groin appears consistent with seroma  - now off the Meropenem    #s/p OLT   - Continue bactrim, valcyte (CMV D+/R-) for prophylaxis  - Monitor patient for tremors - was on tacrolimus(CI), then cylosporine(CI), now on Everolimus(mTor)    #Right heel wound  --Podiatry on board    --Local wound care     Suyapa Mayer MD, PGY4   ID fellow  Pager: [phone]  After 5pm/weekends call [phone]    d/w Dr. Pérez 63 y/o M PMHx decompensated JEAN Cirrhosis on transplant list, DMII, HFpEF, recent admission for ESBL E coli prostatic abscess 2/2 4 wks ertapenem, hx of ESBL UTIs with prostate abscess s/p IV abx, recent VRE urinary colonization came to hospital for worsening jaundice and episode of confusion, resolved PTA. s/p treatment course for possible VRE UTI. Now s/p OLT on 7/2/20. Course complicated by Encephalopathy and Tremors. Blood Cultures (7/10) with VRE sensitive to Linezolid. CT C/A/P without obvious intraabdominal source. Hypoxia prior to positive BCx which may be related to sepsis CT Chest (7/11) without evidence of pneumonia. Had L groin fluid collection which appears to be consistent with seroma - sample obtained for culture also negative. No drainage, erythema or underlying fluctuance was appreciated at the right heel. MRI prior to transplant without obvious infection. Transferred from SICU to [facility] on 7/17/2020.    Currently on:  Daptomycin 800mg q24h  nystatin    Suspension [id number] four times a day  trimethoprim   80 mG/sulfamethoxazole 400 mG 1 daily  valGANciclovir 900 daily    #VRE Bacteremia, L Groin Collection, Encephalopathy  - s/p Linezolid and Daptomycin on 7/26 for VRE bacteremia post-OLT  - Abd US showed 9cm Left groin collection: likely represents a seroma or lymphocele; aspirated - cx: PMN seen, no organism seen  - Continue monitoring cylosporine levels  - TTE negative for endocarditis  - UA/UC on 7/20 no UTI  - BCx on 7/20 NGTD  - Doubt if he has true UTI or prostate infection and his UC on 7/20 also no growth  - Switched to Everolimus on 7/27 to avoid CNI CNS Toxicity - would monitor mental status   - Would recommend CNS imaging given persistent encephalopathy   - If encephalopathy does not improve and CNS imaging unrevealing would pursue LP    #Hypoxic Respiratory Failure, Encephalopathy  - No evidence of pneumonia and collection in left groin appears consistent with seroma  - now off the Meropenem    #s/p OLT   - Continue bactrim, valcyte (CMV D+/R-) for prophylaxis  - Monitor patient for tremors - was on tacrolimus(CI), then cylosporine(CI), now on Everolimus(mTor I)    #Right heel wound  --Podiatry on board    --Local wound care     Suyapa Mayer MD, PGY4   ID fellow  Pager: [phone]  After 5pm/weekends call [phone]    d/w Dr. Pérez

## 2020-07-28 NOTE — PROGRESS NOTE ADULT - ATTENDING COMMENTS
Hepatology Staff: Yomi Medina MD    I saw and examined the patient along with Dr. Singh 07-28-20 @ 17:03.    Patient Medical Record, hosptial course was reviewed and summarized as below:    Vitals: Vital Signs Last 24 Hrs  T(C): 36.8 (28 Jul 2020 16:55), Max: 37.1 (28 Jul 2020 05:00)  T(F): 98.2 (28 Jul 2020 16:55), Max: 98.7 (28 Jul 2020 05:00)  HR: 76 (28 Jul 2020 16:55) (76 - 95)  BP: 140/89 (28 Jul 2020 16:55) (110/70 - 148/78)  BP(mean): 87 (28 Jul 2020 05:00) (80 - 106)  RR: 18 (28 Jul 2020 16:55) (18 - 20)  SpO2: 98% (28 Jul 2020 16:55) (94% - 100%)    Labs:Tacrolimus (), Serum: <2.0 ng/mL (07-28-20 @ 07:16)      I/O: I&O's Summary    27 Jul 2020 07:01  -  28 Jul 2020 07:00  --------------------------------------------------------  IN: 2556 mL / OUT: 450 mL / NET: 2106 mL    28 Jul 2020 07:01  -  28 Jul 2020 17:03  --------------------------------------------------------  IN: 480 mL / OUT: 0 mL / NET: 480 mL      Nutritional Status: Albumin, Serum: 3.1 g/dL (07-28-20 @ 06:15)    Last 24 hour events: Continues to remain delirious.    Recommendations: Cyclosporine has been d/karlee and switched to Everolimus 3/3. Levels pending. Keep current dose. Decrease Trazadone to 50 mg PO qd. Agree with the rest.    Plan discussed with Primary team.

## 2020-07-28 NOTE — PROGRESS NOTE ADULT - SUBJECTIVE AND OBJECTIVE BOX
Follow Up:  post-OLT on 7/2, Blood Cultures (7/10) with VRE    Interval History/ROS:Patient is a 64y old  Male who presents with a chief complaint of Liver failure, hepatic encephalopathy (28 Jul 2020 13:06)  - Pt is sleeping with spontaneous jerky movements as if day dreaming.  - Now on Everolimus to avoid CNS side effect    Allergies  codeine (Anaphylaxis)    ANTIMICROBIALS:    nystatin    Suspension [id number] four times a day  trimethoprim   80 mG/sulfamethoxazole 400 mG 1 daily  valGANciclovir 900 daily      OTHER MEDS: MEDICATIONS  (STANDING):  aspirin enteric coated 81 daily  everolimus (ZORTRESS) 2 <User Schedule>  haloperidol    Injectable 0.5 every 12 hours PRN  insulin lispro (HumaLOG) corrective regimen sliding scale  Before meals and at bedtime  mycophenolate mofetil 750 <User Schedule>  pantoprazole    Tablet 40 before breakfast  predniSONE   Tablet 5 daily  tamsulosin 0.8 at bedtime  traZODone 50 at bedtime      Vital Signs Last 24 Hrs  T(F): 97.6 (07-28-20 @ 14:00), Max: 98.7 (07-24-20 @ 22:00)    Vital Signs Last 24 Hrs  HR: 83 (07-28-20 @ 14:00) (81 - 95)  BP: 135/65 (07-28-20 @ 14:00) (110/70 - 148/78)  RR: 18 (07-28-20 @ 14:00)  SpO2: 96% (07-28-20 @ 14:00) (94% - 100%)  Wt(kg): --    EXAM:  Constitutional: Not in acute distress. Sleeping, jerking, not waking up.  Eyes: pupils bilaterally reactive to light. No icterus.  Oral cavity: Clear, no lesions  Neck: No neck vein distension noted  RS: Chest clear to auscultation bilaterally. No wheeze/rhonchi/crepitations.  CVS: S1, S2 heard. Regular rate and rhythm. No murmurs/rubs/gallops.  Abdomen: Liver transplant suture clean+. Soft. No guarding/rigidity/tenderness. Left groin suture clean+  : No acute abnormalities  Extremities: Warm. No pedal edema  Skin: No lesions noted  Vascular: No evidence of phlebitis  Neuro: Alert, oriented to time/place(does not know hospital name)/person; obviously in encephalopathy      Labs:                        7.7    4.13  )-----------( 108      ( 28 Jul 2020 06:15 )             22.6     07-28    140  |  107  |  27<H>  ----------------------------<  101<H>  5.0   |  23  |  1.31<H>    Ca    9.1      28 Jul 2020 06:15  Phos  3.2     07-28  Mg     1.8     07-28    TPro  5.3<L>  /  Alb  3.1<L>  /  TBili  0.7  /  DBili  0.3<H>  /  AST  15  /  ALT  16  /  AlkPhos  104  07-28    WBC Count: 4.13 (07-28-20 @ 06:15)  WBC Count: 3.46 (07-27-20 @ 05:26)  WBC Count: 3.57 (07-26-20 @ 06:03)  WBC Count: 3.65 (07-25-20 @ 06:00)  WBC Count: 3.88 (07-24-20 @ 05:54)  WBC Count: 3.79 (07-23-20 @ 05:48)  WBC Count: 3.79 (07-22-20 @ 06:46)    Creatinine, Serum: 1.31 (07-28)  Creatinine, Serum: 1.41 (07-27)  Creatinine, Serum: 1.36 (07-26)  Creatinine, Serum: 1.52 (07-25)  Creatinine, Serum: 1.49 (07-24)  Creatinine, Serum: 1.80 (07-23)  Creatinine, Serum: 1.77 (07-22)      MICROBIOLOGY:    CMV IgG Antibody: <0.20 U/mL (12-04-19 @ 08:33)  Toxoplasma IgG Screen: <3.0 IU/mL (12-04-19 @ 08:33)          RADIOLOGY:  imaging below personally reviewed    OTHER TESTS:  COVID-19 PCR: NotDetec (07-24-20 @ 06:44)  Procalcitonin, Serum: 0.21 (07-28 @ 06:15)

## 2020-07-28 NOTE — PROGRESS NOTE ADULT - ATTENDING COMMENTS
remains very sedated and delirious  Plan  cyclosporin level  continue everolimus  decrease trazadone to 50mg nocte

## 2020-07-28 NOTE — PROGRESS NOTE ADULT - ATTENDING COMMENTS
64M PMH JEAN cirrhosis s/p OLT on 7/2/20  Post-Op Course Complicated by Encephalopathy and Tremors  Blood Cultures (7/10) with VRE  CT C/A/P (7/11) without obvious intraabdominal source.  Hypoxia prior to positive BCx which may be related to sepsis   Had L groin fluid collection which appears to be consistent with seroma - sample obtained for culture  TTE limited but without obvious vegetations  Completed 2 week course of antibiotics from negative cultures for VRE Bacteremia (End Date: 7/26)    Continued encephalopathy and tremor  U/A from 7/20 without pyuria  COVID19 PCR 7/24 negative  Blood Cultures 7/20 NGTD    Encephalitis or infectious etiology seems less likely  Switched from Cyclosporine to Everolimus on 7/27 to avoid CNI CNS Toxicity - would monitor mental status   Would recommend CNS imaging if encephalopathy does not improve (would likely require sedation)   If encephalopathy does not improve and CNS imaging unrevealing would pursue LP    I will continue to follow. Please feel free to contact me with any further questions.    Eusebio Pérez M.D.  [facility] Division of Infectious Disease  8AM-5PM: Pager Number [phone]  After Hours (or if no response): Please contact the Infectious Diseases Office at [phone]

## 2020-07-28 NOTE — PROGRESS NOTE ADULT - ASSESSMENT
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 c/b VRE bactermia and delerium    [] POD 25 s/p OLT   - Good graft function, LFTs stable   - Immuno: off tacro as of 7/19. Off Cyclosporine since 7/27 in the setting of AMS, started everolimus 2/2 BID, decrease MMF back down to 750 (received one dose of 1gm this am),  Pred 5mg daily  - PPX: valcyte/nystatin/bactrim  - Continue to monitor cyclosporine level daily, and everolimus  - Noted with drop in H&H, transfuse one u PRBC and f/u CBC  - Add albumin 25% Q8 x3 doses for dehydration  - Continue ASA 81mg daily  - h/o UGI bleed: Protonix 40mg daily   - Carb restricted diet as tolerated  - PO reg diet  - Decrease Trazadone to 50mg qhs and c/w Haldol PRN for agitation   - DVT PPx: SCDs at all times  - Sacral decub: seen by  wound care, recs appreciated   - Pain control PRN    [] VRE bacteremia (7/10)  - ID following: S/P dapto.  ended 7/26  - Bld cx from 7/20 with NG, urine cx (7/20) with NG     [] Hyperglycemia  - Appreciate endocrinology recommendations  - low blood glucose levels due to AMS and limited oral intake   - continue to monitor BG with Humalog insulin SSI coverage for now (was previously on Humalog 5U premeal and Lantus 8 units qhs)  - Fingersticks ac and qhs    [] Heel ulcer  - Continue local wound care w/ santyl and DSD as per podiatry    [] Dispo:   - continue 1:1 with tele 64M with IDDM, HLD, obesity, HFpEF with mild LV diastolic dysfunction, MGUS, chronic anemia with a history of duodenal ulcer as well as GAVE and duodenal AVM s/p APC (last on 10/11/19), 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 c/b VRE bactermia and delerium    [] POD 26 s/p OLT   - Good graft function, LFTs stable   - Immuno: off tacro as of 7/19. Off Cyclosporine since 7/27 in the setting of AMS, started everolimus 2/2 BID, decrease MMF back down to 750 (received one dose of 1gm this am),  Pred 5mg daily  - PPX: valcyte/nystatin/bactrim  - Continue to monitor cyclosporine level daily, and everolimus  - Noted with drop in H&H, transfuse one u PRBC and f/u CBC  - Add albumin 25% Q8 x3 doses for dehydration  - Continue ASA 81mg daily  - h/o UGI bleed: Protonix 40mg daily   - Carb restricted diet as tolerated  - PO reg diet  - Decrease Trazadone to 50mg qhs and c/w Haldol PRN for agitation   - DVT PPx: SCDs at all times  - Sacral decub: seen by  wound care, recs appreciated   - Pain control PRN    [] VRE bacteremia (7/10)  - ID following: S/P dapto.  ended 7/26  - Bld cx from 7/20 with NG, urine cx (7/20) with NG     [] Hyperglycemia  - Appreciate endocrinology recommendations  - low blood glucose levels due to AMS and limited oral intake   - continue to monitor BG with Humalog insulin SSI coverage for now (was previously on Humalog 5U premeal and Lantus 8 units qhs)  - Fingersticks ac and qhs    [] Heel ulcer  - Continue local wound care w/ santyl and DSD as per podiatry    [] Dispo:   - continue 1:1 with tele

## 2020-07-28 NOTE — PROGRESS NOTE ADULT - SUBJECTIVE AND OBJECTIVE BOX
Transplant Surgery Progress Note    OLTx      Date:  7/2/2020       POD#26    Present:   Patient seen with multidisciplinary team including Transplant Surgeon: Dr. Chris, Dr. Lucio, Dr. Ackerman. Transplant hepayologist Dr. Medina, Transplant Nephrologist Dr. Rowland, renal fellow, NP Patrick, PA student, PGY 3 Dr. Dickinson, and unit RN and RN manager and examined with Dr. Ackerman during am rounds.  Disciplines not in attendance will be notified of the plan.     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     Re-admitted on 6/11:   ·	worsening HE  ·	UTI/VRE: tx with linezolid 6/15-22, bactrim DS 6/22 - 7/2  ·	MISA/Hyponatremia  ·	UGI bleed (melena) s/p EGD (6/22) c/w hemorrhagic duodenitis/gastritis; Grade 2 EV; requiring multiple transfusions  ·	Known h/o R foot ulcer (MRI neg for OM)  ·	s/p OLTx on 7/2/2020, post op liver doppler with patent vessels  ·	post op course c/b delirium and VRE bacteremia (7/10)     Interval events:  - POD 26 s/p OLT with good graft function;  LFTs stable  - H&H drop to 7.7/22.6 from 8.5/25.4  - Noted to be very sleepy this am on Trazadone 100mg @HS for agitation  - Mental status this AM: confused, sleepy+ less tremors  - tolerating PO   - Received one liter NS bolus over 6 hours for dehydration  - Afebrile.  WBC 4.13 S/P Daptomycin for VRE bacteremia.  7/20 BCx and UCx ngtd    Potential Discharge date: pending clinical improvement     Education:  Medications    Plan of care:  See Below            MEDICATIONS  (STANDING):  albumin human 25% IVPB 100 milliLiter(s) IV Intermittent every 8 hours  aspirin enteric coated 81 milliGRAM(s) Oral daily  chlorhexidine 2% Cloths 1 Application(s) Topical <User Schedule>  collagenase Ointment 1 Application(s) Topical daily  everolimus (ZORTRESS) 2 milliGRAM(s) Oral <User Schedule>  insulin lispro (HumaLOG) corrective regimen sliding scale   SubCutaneous Before meals and at bedtime  magnesium oxide 800 milliGRAM(s) Oral two times a day with meals  mycophenolate mofetil 750 milliGRAM(s) Oral <User Schedule>  nystatin    Suspension 791052 Unit(s) Oral four times a day  nystatin Cream 1 Application(s) Topical two times a day  pantoprazole    Tablet 40 milliGRAM(s) Oral before breakfast  predniSONE   Tablet 5 milliGRAM(s) Oral daily  tamsulosin 0.8 milliGRAM(s) Oral at bedtime  traZODone 50 milliGRAM(s) Oral at bedtime  trimethoprim   80 mG/sulfamethoxazole 400 mG 1 Tablet(s) Oral daily  valGANciclovir 900 milliGRAM(s) Oral daily    MEDICATIONS  (PRN):  haloperidol    Injectable 0.5 milliGRAM(s) IV Push every 12 hours PRN Agitation      PAST MEDICAL & SURGICAL HISTORY:  GIB (gastrointestinal bleeding)  GERD with esophagitis: Gastritis &amp; Non Bleeding Ulcers  Hepatic encephalopathy  Obesity  Fatty liver disease, nonalcoholic  Renal stones: 25 years ago  Hypertension  Neuropathy  Hypercholesteremia  Diabetes  S/P cholecystectomy      Vital Signs Last 24 Hrs  T(C): 36.9 (28 Jul 2020 09:20), Max: 37.1 (28 Jul 2020 05:00)  T(F): 98.4 (28 Jul 2020 09:20), Max: 98.7 (28 Jul 2020 05:00)  HR: 86 (28 Jul 2020 09:20) (81 - 95)  BP: 110/70 (28 Jul 2020 09:20) (110/70 - 148/78)  BP(mean): 87 (28 Jul 2020 05:00) (80 - 106)  RR: 18 (28 Jul 2020 09:20) (18 - 20)  SpO2: 97% (28 Jul 2020 09:20) (97% - 100%)    I&O's Summary    27 Jul 2020 07:01  -  28 Jul 2020 07:00  --------------------------------------------------------  IN: 2556 mL / OUT: 450 mL / NET: 2106 mL    28 Jul 2020 07:01  -  28 Jul 2020 11:35  --------------------------------------------------------  IN: 100 mL / OUT: 0 mL / NET: 100 mL                              7.7    4.13  )-----------( 108      ( 28 Jul 2020 06:15 )             22.6     07-28    140  |  107  |  27<H>  ----------------------------<  101<H>  5.0   |  23  |  1.31<H>    Ca    9.1      28 Jul 2020 06:15  Phos  3.2     07-28  Mg     1.8     07-28    TPro  5.3<L>  /  Alb  3.1<L>  /  TBili  0.7  /  DBili  0.3<H>  /  AST  15  /  ALT  16  /  AlkPhos  104  07-28    Tacrolimus (), Serum: <2.0 ng/mL (07-28 @ 07:16)                ROS  Unable to obtain due to AMS    Physical Exam  Constitutional: AMS, + tremors  Eyes: Anicteric, PERRLA  ENMT: nc/at  Neck: no central line, no JVD  Respiratory: CTA B/L, unlabored breathing   Cardiovascular: RRR  Gastrointestinal: soft, NT, ND, incision c/d/i, T-tube tied   Genitourinary: Voiding spontaneously  Extremities: heel wound without drainage or purulent material, edema improving   Vascular: Palpable dp pulses bilaterally  Neurological: lethargic. Confused, restless, +tremors, TORREZ  Skin: no rashes  Musculoskeletal: Moving all extremities  Psychiatric: Responsive

## 2020-07-28 NOTE — PROGRESS NOTE ADULT - SUBJECTIVE AND OBJECTIVE BOX
Chief Complaint:  Patient is a 64y old  Male who presents with a chief complaint of Liver failure, hepatic encephalopathy (2020 11:14)      Interval Events: continues to have ongoing delirium and agitation.     Allergies:  codeine (Anaphylaxis)      Hospital Medications:  albumin human 25% IVPB 100 milliLiter(s) IV Intermittent every 8 hours  aspirin enteric coated 81 milliGRAM(s) Oral daily  chlorhexidine 2% Cloths 1 Application(s) Topical <User Schedule>  collagenase Ointment 1 Application(s) Topical daily  everolimus (ZORTRESS) 2 milliGRAM(s) Oral <User Schedule>  haloperidol    Injectable 0.5 milliGRAM(s) IV Push every 12 hours PRN  insulin lispro (HumaLOG) corrective regimen sliding scale   SubCutaneous Before meals and at bedtime  magnesium oxide 800 milliGRAM(s) Oral two times a day with meals  mycophenolate mofetil 750 milliGRAM(s) Oral <User Schedule>  nystatin    Suspension 768963 Unit(s) Oral four times a day  nystatin Cream 1 Application(s) Topical two times a day  pantoprazole    Tablet 40 milliGRAM(s) Oral before breakfast  predniSONE   Tablet 5 milliGRAM(s) Oral daily  tamsulosin 0.8 milliGRAM(s) Oral at bedtime  traZODone 50 milliGRAM(s) Oral at bedtime  trimethoprim   80 mG/sulfamethoxazole 400 mG 1 Tablet(s) Oral daily  valGANciclovir 900 milliGRAM(s) Oral daily      PMHX/PSHX:  GIB (gastrointestinal bleeding)  GERD with esophagitis  Hepatic encephalopathy  Obesity  Fatty liver disease, nonalcoholic  Renal stones  Hypertension  Neuropathy  Hypercholesteremia  Diabetes  S/P cholecystectomy  No significant past surgical history      Family history:  Family history of type 2 diabetes mellitus  Family history of hypertension  Family history of stomach cancer  No pertinent family history in first degree relatives      ROS: As per HPI, 14-point ROS negative otherwise.        PHYSICAL EXAM:     Vital Signs:  Vital Signs Last 24 Hrs  T(C): 36.9 (2020 09:20), Max: 37.1 (2020 05:00)  T(F): 98.4 (2020 09:20), Max: 98.7 (2020 05:00)  HR: 86 (2020 09:20) (81 - 95)  BP: 110/70 (2020 09:20) (110/70 - 148/78)  BP(mean): 87 (2020 05:00) (80 - 106)  RR: 18 (2020 09:20) (18 - 20)  SpO2: 97% (2020 09:20) (97% - 100%)  Daily     Daily Weight in k.6 (2020 09:20)    GENERAL: appears confused  NEURO: alert, not oriented, + tremors, no asterixis  HEENT: anicteric sclera, no conjunctival pallor appreciated  CHEST: no respiratory distress, no accessory muscle use  CARDIAC: regular rate, rhythm  ABDOMEN: soft, non-tender, non-distended, no rebound or guarding  EXTREMITIES: warm, well perfused, no edema  SKIN: no lesions noted    LABS:                        7.7    4.13  )-----------( 108      ( 2020 06:15 )             22.6     07-28    140  |  107  |  27<H>  ----------------------------<  101<H>  5.0   |  23  |  1.31<H>    Ca    9.1      2020 06:15  Phos  3.2     07-28  Mg     1.8     07-28    TPro  5.3<L>  /  Alb  3.1<L>  /  TBili  0.7  /  DBili  0.3<H>  /  AST  15  /  ALT  16  /  AlkPhos  104  07-28    LIVER FUNCTIONS - ( 2020 06:15 )  Alb: 3.1 g/dL / Pro: 5.3 g/dL / ALK PHOS: 104 U/L / ALT: 16 U/L / AST: 15 U/L / GGT: x           PT/INR - ( 2020 06:15 )   PT: 12.7 sec;   INR: 1.07 ratio         PTT - ( 2020 06:15 )  PTT:29.3 sec        Imaging:

## 2020-07-28 NOTE — PROGRESS NOTE ADULT - ASSESSMENT
64 M hx of DM, HLD, GERD, HFpEF with mild LV diastolic dysfunction, and decompensated JEAN cirrhosis c/b ascites with hx of SBP, HE, duodenal ulcer, GAVE and duodenal AVM s/p APC (last on 10/11/19), UNOS listed for liver transplantation at [facility]. 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 S/p liver transplant 7/2 transferred from SICU to floor on 7/17/20 now with signs of tacrolimus toxicity and significant hospital delerium.     Impression:  #S/p liver transplant 7/2: liver enzymes continue to downtrend  OR details:  - L groin cutdown for vv bypass   - anastomosis: bicaval end-end/CBD duct-duct/HA & PV end-end, all standard anatomy.    - IOC with good flow  - Simulect induction. 500mg Solumedrol  - JPs x3 with T-Tube  - 14U pRBC, 14U FFP, 10 PLT, 11 CRYO, 500mL returned from cell saver, EBL 5L, UOP 1100mL  Recipient Info:   ABO: A  CMV:  Neg  EBV Positive  Donor:  Donor ID:  [id number] Match: [id number]  Age: 29 ABO:  A1 High Risk:   No COD: Cardiovascular  Anti CMV positive, EBV IgG- positive  HepBcAb-neg, Hepatitis C-DANA- neg, Hepatitis C ab-neg    # Halluciniations/agitation/AMS: worse at night. now over sedated. Getting significant chemical sedation prior. At times combative. Likely secondary to sleep wake cycle disturbances vs. ICU delerium vs. medication induced. On Seroquel, mirtazapine, trazodone, and melatonin at night during hositap course. Olanzapine added PRN per Behavioral Health on 7/18/20. Now limiting meds to avoid further delerium and side effect risks  # Tremors: Involving bilateral UE - concern for CNI toxicity requiring dose adjustments per Transplant Surgery.   # Sacral decubitus ulcer: does not appear infected  # MISA: serum Cr improved again to 1.7. No episodes of hypotension. UOP remains good. Suspect MISA due to overdiuresis vs. tacrolimus toxicity vs. side effect of meropenem vs transient hypotension  # Elevated liver enzymes: Resolved with mildly elevated ALK-P. Possible secondary to DILI vs. sepsis induced from VRE bacteremia.   # VRE on culture from 7/10: Repeat culture 7/12 negative. On linezolid, s/p meropenem. CT showing groin hematoma, s/p aspiration showing likely seroma and low c/f infection per ID.   #GI bleed: Resolved. Hgb stable with no overt bleeding post-transplant. 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 GAVE and PHG likely improved posttransplant.  # Abnormal Chest X-Ray c/f evolving PNA vs atelectasis. No signs of active infection  # Hypertension: Had episode of hypotension. Now BP normal range.   # Lower ext edema: Improved with Lasix. Diuretics discontinued for MISA.   #R posterior heel wound w/ overlying eschar –Local wound care. no signs of infections, XR neg for gas, evaluated by podiatry and ID. MRI w/o contrast limited, but no overt signs of active infection.  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     Recommendation:  - continue with everolimus  - frequent reorientation and visitation   - normalized sleep/wake cycle environment  - haldol as needed, trazodone qHS (decrease dose to 50mg qhs)  - give albumin for dehydration  - continue prednisone 5 mg PO daily  - continue Cellcept (currently on 750 mg BID) - dosing as per transplant surgery  - abx per ID team  - continue nystatin, Bactrim and Valcyte ppx  - physical therapy daily to get OOB and ambulating  - continue with mag oxide 200 mg BID  - monitor Hb on daily CBC - continue PO PPI BID, monitor bowel movements  - continue to hold diuretics  - follow up Wound care recommendations for R heel ulcer and sacral decub  - follow up other Transplant surgery recommendations

## 2020-07-28 NOTE — PROGRESS NOTE ADULT - SUBJECTIVE AND OBJECTIVE BOX
INTERVAL HPI/OVERNIGHT EVENTS:    patient seen and examined   remains delirious, agitated  less tremulous upon eval   tolerated PO with assistance as per PCA   no bms today  decline in hemoglobin from 8.5 --> 7.7 without overt signs of gastrointestinal bleeding; 1uprb ordered       MEDICATIONS  (STANDING):  aspirin enteric coated 81 milliGRAM(s) Oral daily  chlorhexidine 2% Cloths 1 Application(s) Topical <User Schedule>  collagenase Ointment 1 Application(s) Topical daily  furosemide    Tablet 80 milliGRAM(s) Oral every 8 hours  insulin glargine Injectable (LANTUS) 16 Unit(s) SubCutaneous at bedtime  insulin lispro (HumaLOG) corrective regimen sliding scale   SubCutaneous three times a day before meals  insulin lispro (HumaLOG) corrective regimen sliding scale   SubCutaneous at bedtime  insulin lispro Injectable (HumaLOG) 10 Unit(s) SubCutaneous before dinner  insulin lispro Injectable (HumaLOG) 8 Unit(s) SubCutaneous before breakfast  insulin lispro Injectable (HumaLOG) 8 Unit(s) SubCutaneous before lunch  linezolid  IVPB 600 milliGRAM(s) IV Intermittent every 12 hours  magnesium oxide 800 milliGRAM(s) Oral two times a day with meals  meropenem  IVPB 1000 milliGRAM(s) IV Intermittent every 8 hours  nystatin    Suspension 503295 Unit(s) Swish and Swallow four times a day  nystatin Cream 1 Application(s) Topical two times a day  pantoprazole  Injectable 40 milliGRAM(s) IV Push every 12 hours  polyethylene glycol 3350 17 Gram(s) Oral daily  predniSONE   Tablet 5 milliGRAM(s) Oral daily  senna 2 Tablet(s) Oral at bedtime  tacrolimus 3 milliGRAM(s) Oral <User Schedule>  tacrolimus 2 milliGRAM(s) Oral <User Schedule>  tamsulosin 0.8 milliGRAM(s) Oral at bedtime  trimethoprim   80 mG/sulfamethoxazole 400 mG 1 Tablet(s) Oral daily  valGANciclovir 450 milliGRAM(s) Oral daily    MEDICATIONS  (PRN):  glucagon  Injectable 1 milliGRAM(s) IntraMuscular once PRN Glucose <70 milliGRAM(s)/deciLiter      Allergies    codeine (Anaphylaxis)    Intolerances        Review of Systems: unable to obtain           Vital Signs Last 24 Hrs  T(C): 36.3 (12 Jul 2020 15:00), Max: 36.8 (11 Jul 2020 19:00)  T(F): 97.3 (12 Jul 2020 15:00), Max: 98.2 (11 Jul 2020 19:00)  HR: 80 (12 Jul 2020 15:00) (72 - 83)  BP: 109/59 (12 Jul 2020 15:00) (94/55 - 154/68)  BP(mean): 78 (12 Jul 2020 15:00) (70 - 125)  RR: 15 (12 Jul 2020 15:00) (9 - 28)  SpO2: 96% (12 Jul 2020 15:00) (96% - 100%)    PHYSICAL EXAM:    Constitutional: confused   HEENT: sclera anicteric   Neck: No LAD, supple  Gastrointestinal: BS+, soft, NT, ND, incisions c/d/i  Extremities: +b/l peripheral edema  Neurological: delirious, agitated     LABS:                        9.3    5.51  )-----------( 98       ( 12 Jul 2020 01:54 )             28.6     07-12    131<L>  |  95<L>  |  31<H>  ----------------------------<  217<H>  5.0   |  27  |  1.26    Ca    8.1<L>      12 Jul 2020 12:22  Phos  4.0     07-12  Mg     2.0     07-12    TPro  4.4<L>  /  Alb  2.6<L>  /  TBili  1.1  /  DBili  0.5<H>  /  AST  64<H>  /  ALT  98<H>  /  AlkPhos  212<H>  07-12    PT/INR - ( 12 Jul 2020 01:54 )   PT: 12.7 sec;   INR: 1.12 ratio         PTT - ( 12 Jul 2020 01:54 )  PTT:28.7 sec      RADIOLOGY & ADDITIONAL TESTS:

## 2020-07-28 NOTE — PROGRESS NOTE ADULT - PROBLEM SELECTOR PLAN 2
- monitor cbc.  - decline in hemoglobin without overt signs of bleeding; 1uprbc ordered. Follow-up post transfusion cbc to assess response

## 2020-07-29 LAB
ALBUMIN SERPL ELPH-MCNC: 3.6 G/DL — SIGNIFICANT CHANGE UP (ref 3.3–5)
ALP SERPL-CCNC: 96 U/L — SIGNIFICANT CHANGE UP (ref 40–120)
ALT FLD-CCNC: 15 U/L — SIGNIFICANT CHANGE UP (ref 10–45)
ANION GAP SERPL CALC-SCNC: 10 MMOL/L — SIGNIFICANT CHANGE UP (ref 5–17)
APTT BLD: 29 SEC — SIGNIFICANT CHANGE UP (ref 27.5–35.5)
AST SERPL-CCNC: 16 U/L — SIGNIFICANT CHANGE UP (ref 10–40)
BILIRUB DIRECT SERPL-MCNC: 0.3 MG/DL — HIGH (ref 0–0.2)
BILIRUB INDIRECT FLD-MCNC: 0.4 MG/DL — SIGNIFICANT CHANGE UP (ref 0.2–1)
BILIRUB SERPL-MCNC: 0.7 MG/DL — SIGNIFICANT CHANGE UP (ref 0.2–1.2)
BUN SERPL-MCNC: 27 MG/DL — HIGH (ref 7–23)
CALCIUM SERPL-MCNC: 9.3 MG/DL — SIGNIFICANT CHANGE UP (ref 8.4–10.5)
CHLORIDE SERPL-SCNC: 100 MMOL/L — SIGNIFICANT CHANGE UP (ref 96–108)
CO2 SERPL-SCNC: 23 MMOL/L — SIGNIFICANT CHANGE UP (ref 22–31)
CREAT SERPL-MCNC: 1.33 MG/DL — HIGH (ref 0.5–1.3)
CYCLOSPORINE SER-MCNC: 74 NG/ML — LOW (ref 150–400)
GLUCOSE BLDC GLUCOMTR-MCNC: 106 MG/DL — HIGH (ref 70–99)
GLUCOSE BLDC GLUCOMTR-MCNC: 155 MG/DL — HIGH (ref 70–99)
GLUCOSE BLDC GLUCOMTR-MCNC: 160 MG/DL — HIGH (ref 70–99)
GLUCOSE BLDC GLUCOMTR-MCNC: 162 MG/DL — HIGH (ref 70–99)
GLUCOSE SERPL-MCNC: 98 MG/DL — SIGNIFICANT CHANGE UP (ref 70–99)
HCT VFR BLD CALC: 24.7 % — LOW (ref 39–50)
HGB BLD-MCNC: 8.4 G/DL — LOW (ref 13–17)
INR BLD: 1.09 RATIO — SIGNIFICANT CHANGE UP (ref 0.88–1.16)
MAGNESIUM SERPL-MCNC: 1.8 MG/DL — SIGNIFICANT CHANGE UP (ref 1.6–2.6)
MCHC RBC-ENTMCNC: 32.6 PG — SIGNIFICANT CHANGE UP (ref 27–34)
MCHC RBC-ENTMCNC: 34 GM/DL — SIGNIFICANT CHANGE UP (ref 32–36)
MCV RBC AUTO: 95.7 FL — SIGNIFICANT CHANGE UP (ref 80–100)
NRBC # BLD: 0 /100 WBCS — SIGNIFICANT CHANGE UP (ref 0–0)
PHOSPHATE SERPL-MCNC: 3.4 MG/DL — SIGNIFICANT CHANGE UP (ref 2.5–4.5)
PLATELET # BLD AUTO: 112 K/UL — LOW (ref 150–400)
POTASSIUM SERPL-MCNC: 4.6 MMOL/L — SIGNIFICANT CHANGE UP (ref 3.5–5.3)
POTASSIUM SERPL-SCNC: 4.6 MMOL/L — SIGNIFICANT CHANGE UP (ref 3.5–5.3)
PROCALCITONIN SERPL-MCNC: 0.17 NG/ML — HIGH (ref 0.02–0.1)
PROT SERPL-MCNC: 5.7 G/DL — LOW (ref 6–8.3)
PROTHROM AB SERPL-ACNC: 12.9 SEC — SIGNIFICANT CHANGE UP (ref 10.6–13.6)
RBC # BLD: 2.58 M/UL — LOW (ref 4.2–5.8)
RBC # FLD: 17.5 % — HIGH (ref 10.3–14.5)
SODIUM SERPL-SCNC: 133 MMOL/L — LOW (ref 135–145)
WBC # BLD: 3.72 K/UL — LOW (ref 3.8–10.5)
WBC # FLD AUTO: 3.72 K/UL — LOW (ref 3.8–10.5)

## 2020-07-29 PROCEDURE — 99232 SBSQ HOSP IP/OBS MODERATE 35: CPT

## 2020-07-29 PROCEDURE — 99232 SBSQ HOSP IP/OBS MODERATE 35: CPT | Mod: GC,24

## 2020-07-29 RX ORDER — MYCOPHENOLATE MOFETIL 250 MG/1
1000 CAPSULE ORAL ONCE
Refills: 0 | Status: DISCONTINUED | OUTPATIENT
Start: 2020-07-29 | End: 2020-07-29

## 2020-07-29 RX ORDER — MAGNESIUM SULFATE 500 MG/ML
1 VIAL (ML) INJECTION ONCE
Refills: 0 | Status: COMPLETED | OUTPATIENT
Start: 2020-07-29 | End: 2020-07-29

## 2020-07-29 RX ORDER — SODIUM CHLORIDE 9 MG/ML
1000 INJECTION INTRAMUSCULAR; INTRAVENOUS; SUBCUTANEOUS
Refills: 0 | Status: DISCONTINUED | OUTPATIENT
Start: 2020-07-29 | End: 2020-08-02

## 2020-07-29 RX ORDER — VALGANCICLOVIR 450 MG/1
900 TABLET, FILM COATED ORAL ONCE
Refills: 0 | Status: COMPLETED | OUTPATIENT
Start: 2020-07-29 | End: 2020-07-29

## 2020-07-29 RX ADMIN — Medication 500000 UNIT(S): at 11:58

## 2020-07-29 RX ADMIN — Medication 1: at 21:34

## 2020-07-29 RX ADMIN — Medication 1: at 18:12

## 2020-07-29 RX ADMIN — TAMSULOSIN HYDROCHLORIDE 0.8 MILLIGRAM(S): 0.4 CAPSULE ORAL at 21:34

## 2020-07-29 RX ADMIN — Medication 500000 UNIT(S): at 17:08

## 2020-07-29 RX ADMIN — Medication 100 GRAM(S): at 10:56

## 2020-07-29 RX ADMIN — PANTOPRAZOLE SODIUM 40 MILLIGRAM(S): 20 TABLET, DELAYED RELEASE ORAL at 05:52

## 2020-07-29 RX ADMIN — EVEROLIMUS 2 MILLIGRAM(S): 10 TABLET ORAL at 08:32

## 2020-07-29 RX ADMIN — Medication 500000 UNIT(S): at 21:34

## 2020-07-29 RX ADMIN — MYCOPHENOLATE MOFETIL 750 MILLIGRAM(S): 250 CAPSULE ORAL at 19:53

## 2020-07-29 RX ADMIN — Medication 500000 UNIT(S): at 05:52

## 2020-07-29 RX ADMIN — NYSTATIN CREAM 1 APPLICATION(S): 100000 CREAM TOPICAL at 17:08

## 2020-07-29 RX ADMIN — Medication 1 TABLET(S): at 11:57

## 2020-07-29 RX ADMIN — Medication 1: at 11:59

## 2020-07-29 RX ADMIN — Medication 81 MILLIGRAM(S): at 11:58

## 2020-07-29 RX ADMIN — Medication 1 APPLICATION(S): at 11:58

## 2020-07-29 RX ADMIN — MAGNESIUM OXIDE 400 MG ORAL TABLET 800 MILLIGRAM(S): 241.3 TABLET ORAL at 17:08

## 2020-07-29 RX ADMIN — MAGNESIUM OXIDE 400 MG ORAL TABLET 800 MILLIGRAM(S): 241.3 TABLET ORAL at 08:31

## 2020-07-29 RX ADMIN — Medication 50 MILLILITER(S): at 07:19

## 2020-07-29 RX ADMIN — VALGANCICLOVIR 900 MILLIGRAM(S): 450 TABLET, FILM COATED ORAL at 13:42

## 2020-07-29 RX ADMIN — SODIUM CHLORIDE 75 MILLILITER(S): 9 INJECTION INTRAMUSCULAR; INTRAVENOUS; SUBCUTANEOUS at 10:56

## 2020-07-29 RX ADMIN — NYSTATIN CREAM 1 APPLICATION(S): 100000 CREAM TOPICAL at 05:53

## 2020-07-29 RX ADMIN — Medication 50 MILLIGRAM(S): at 21:34

## 2020-07-29 RX ADMIN — EVEROLIMUS 2 MILLIGRAM(S): 10 TABLET ORAL at 19:53

## 2020-07-29 RX ADMIN — MYCOPHENOLATE MOFETIL 750 MILLIGRAM(S): 250 CAPSULE ORAL at 08:36

## 2020-07-29 RX ADMIN — CHLORHEXIDINE GLUCONATE 1 APPLICATION(S): 213 SOLUTION TOPICAL at 08:32

## 2020-07-29 NOTE — PROGRESS NOTE ADULT - ASSESSMENT
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 c/b VRE bactermia and delerium    [] POD 26 s/p OLT   - Good graft function, LFTs stable   - Immuno: Tacro DCd 7/19. Cyclosporine DCd 7/27.  Changed MTOR  inhibitor on 7/27 due to persistent delerium/AMS. Everolimus 2mg BID, MMF 750mg BID,  Pred 5mg daily  - Mental status improved this morning.  Will continue Trazadone 50mg qhs and CPAP as needed. PRN Haldol  - Daily Everolimus and Cyclosporine levels  - PPX: valcyte/nystatin/bactrim  - Continue Albumin 25% Q8 x3 doses for dehydration  - Continue ASA 81mg daily  - h/o UGI bleed: Protonix 40mg daily   - Carb restricted diet as tolerated  - DVT PPx: SCDs at all times  - Sacral decub: seen by  wound care, recs appreciated   - Pain control PRN    [] VRE bacteremia (7/10)  - Daptomycin completed on 7/26   - Bld cx from 7/20 with NG, urine cx (7/20) with NG   - Appreciate ID recommendations    [] Hyperglycemia  - Appreciate endocrinology recommendations  - low blood glucose levels due to AMS and limited oral intake   - continue to monitor BG with Humalog insulin SSI coverage for now (was previously on Humalog 5U premeal and Lantus 8 units qhs)  - Fingersticks ac and qhs    [] Heel ulcer  - Continue local wound care w/ santyl and DSD as per podiatry    [] Dispo:   - continue 1:1 with tele

## 2020-07-29 NOTE — PROGRESS NOTE ADULT - SUBJECTIVE AND OBJECTIVE BOX
Transplant Surgery Progress Note    OLTx      Date:  7/2/2020       POD#27    Present:   Patient seen with multidisciplinary team including Transplant Surgeon: Dr. Ackerman, Transplant Nephrology fellow, NP OLY Lentz PA student, PGY 3 Dr. Dickinson, and unit RN and RN manager and examined with Dr. Ackerman during am rounds.  Disciplines not in attendance will be notified of the plan.     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     Re-admitted on 6/11:   ·	worsening HE  ·	UTI/VRE: tx with linezolid 6/15-22, bactrim DS 6/22 - 7/2  ·	MISA/Hyponatremia  ·	UGI bleed (melena) s/p EGD (6/22) c/w hemorrhagic duodenitis/gastritis; Grade 2 EV; requiring multiple transfusions  ·	Known h/o R foot ulcer (MRI neg for OM)  ·	s/p OLTx on 7/2/2020, post op liver doppler with patent vessels  ·	post op course c/b delirium and VRE bacteremia (7/10)     Interval events:  - POD 27 s/p OLT with good graft function;  LFTs stable  - Received Trazadone 50mg at bedtime.  CPAP started for low saturations and was tolerated.  RN stated that patient slept overnight  - Sleepy on rounds but later in morning, awake, sitting in chair, calm, eating breakfast, oriented x2-3. Less tremors noted  - Received 1u PRBC for H/H 7.7/22.6  Today 8.4/24.7  - Albumin 25%q8 yesterday for poor oral intake  - Tmax 36.9   WBC 3.72   7/20 BCx and UCx ngtd    Potential Discharge date: pending clinical improvement     Education:  Medications    Plan of care:  See Below      MEDICATIONS  (STANDING):  aspirin enteric coated 81 milliGRAM(s) Oral daily  chlorhexidine 2% Cloths 1 Application(s) Topical <User Schedule>  collagenase Ointment 1 Application(s) Topical daily  everolimus (ZORTRESS) 2 milliGRAM(s) Oral <User Schedule>  insulin lispro (HumaLOG) corrective regimen sliding scale   SubCutaneous Before meals and at bedtime  magnesium oxide 800 milliGRAM(s) Oral two times a day with meals  magnesium sulfate  IVPB 1 Gram(s) IV Intermittent once  mycophenolate mofetil 750 milliGRAM(s) Oral <User Schedule>  nystatin    Suspension 417256 Unit(s) Oral four times a day  nystatin Cream 1 Application(s) Topical two times a day  pantoprazole    Tablet 40 milliGRAM(s) Oral before breakfast  predniSONE   Tablet 5 milliGRAM(s) Oral daily  sodium chloride 0.9%. 1000 milliLiter(s) (75 mL/Hr) IV Continuous <Continuous>  tamsulosin 0.8 milliGRAM(s) Oral at bedtime  traZODone 50 milliGRAM(s) Oral at bedtime  trimethoprim   80 mG/sulfamethoxazole 400 mG 1 Tablet(s) Oral daily  valGANciclovir 900 milliGRAM(s) Oral daily    MEDICATIONS  (PRN):  haloperidol    Injectable 0.5 milliGRAM(s) IV Push every 12 hours PRN Agitation      PAST MEDICAL & SURGICAL HISTORY:  GIB (gastrointestinal bleeding)  GERD with esophagitis: Gastritis &amp; Non Bleeding Ulcers  Hepatic encephalopathy  Obesity  Fatty liver disease, nonalcoholic  Renal stones: 25 years ago  Hypertension  Neuropathy  Hypercholesteremia  Diabetes  S/P cholecystectomy      Vital Signs Last 24 Hrs  T(C): 36.8 (29 Jul 2020 09:27), Max: 36.8 (28 Jul 2020 16:55)  T(F): 98.3 (29 Jul 2020 09:27), Max: 98.3 (29 Jul 2020 09:27)  HR: 96 (29 Jul 2020 09:27) (74 - 96)  BP: 120/65 (29 Jul 2020 09:27) (116/74 - 158/72)  BP(mean): 86 (29 Jul 2020 09:27) (86 - 89)  RR: 18 (29 Jul 2020 09:27) (18 - 18)  SpO2: 98% (29 Jul 2020 09:27) (78% - 100%)    I&O's Summary    28 Jul 2020 07:01  -  29 Jul 2020 07:00  --------------------------------------------------------  IN: 530 mL / OUT: 600 mL / NET: -70 mL                              8.4    3.72  )-----------( 112      ( 29 Jul 2020 05:53 )             24.7     07-29    133<L>  |  100  |  27<H>  ----------------------------<  98  4.6   |  23  |  1.33<H>    Ca    9.3      29 Jul 2020 05:53  Phos  3.4     07-29  Mg     1.8     07-29    TPro  5.7<L>  /  Alb  3.6  /  TBili  0.7  /  DBili  0.3<H>  /  AST  16  /  ALT  15  /  AlkPhos  96  07-29    Tacrolimus (), Serum: <2.0 ng/mL (07-28 @ 07:16)      ROS  Unable to obtain due to AMS    Physical Exam  Constitutional: more awake and alert, calm, cooperative  Eyes: Anicteric, PERRLA  ENMT: nc/at  Neck: no central line, no JVD  Respiratory: CTA B/L, unlabored breathing   Cardiovascular: RRR  Gastrointestinal: soft, NT, ND, incision c/d/i, T-tube tied   Genitourinary: Voiding spontaneously  Extremities: heel wound without drainage, edema improving   Vascular: Palpable dp pulses bilaterally  Neurological: Alert, oriented x2-3 today, less tremors, TORREZ  Skin: no rashes  Musculoskeletal: Moving all extremities  Psychiatric: Responsive

## 2020-07-29 NOTE — PROVIDER CONTACT NOTE (OTHER) - ASSESSMENT
VS as charted, pt unable to tolerate PO valcyte d/t pill size - pt pocketing medication. Needs oral solution if possible

## 2020-07-29 NOTE — PROGRESS NOTE ADULT - SUBJECTIVE AND OBJECTIVE BOX
Follow Up:  post-OLT on 7/2, Blood Cultures (7/10) with VRE    Interval History/ROS:Patient is a 64y old  Male who presents with a chief complaint of Liver failure, hepatic encephalopathy (28 Jul 2020 13:06)      Allergies  codeine (Anaphylaxis)    ANTIMICROBIALS:    nystatin    Suspension [id number] four times a day  trimethoprim   80 mG/sulfamethoxazole 400 mG 1 daily  valGANciclovir 900 daily    OTHER MEDS: MEDICATIONS  (STANDING):  aspirin enteric coated 81 daily  everolimus (ZORTRESS) 2 <User Schedule>  haloperidol    Injectable 0.5 every 12 hours PRN  insulin lispro (HumaLOG) corrective regimen sliding scale  Before meals and at bedtime  mycophenolate mofetil 750 <User Schedule>  pantoprazole    Tablet 40 before breakfast  predniSONE   Tablet 5 daily  tamsulosin 0.8 at bedtime  traZODone 50 at bedtime      Vital Signs Last 24 Hrs  T(F): 97.6 (07-28-20 @ 14:00), Max: 98.7 (07-24-20 @ 22:00)    Vital Signs Last 24 Hrs  HR: 83 (07-28-20 @ 14:00) (81 - 95)  BP: 135/65 (07-28-20 @ 14:00) (110/70 - 148/78)  RR: 18 (07-28-20 @ 14:00)  SpO2: 96% (07-28-20 @ 14:00) (94% - 100%)  Wt(kg): --    EXAM:  Constitutional: Not in acute distress. Sleeping, jerking, not waking up.  Eyes: pupils bilaterally reactive to light. No icterus.  Oral cavity: Clear, no lesions  Neck: No neck vein distension noted  RS: Chest clear to auscultation bilaterally. No wheeze/rhonchi/crepitations.  CVS: S1, S2 heard. Regular rate and rhythm. No murmurs/rubs/gallops.  Abdomen: Liver transplant suture clean+. Soft. No guarding/rigidity/tenderness. Left groin suture clean+  : No acute abnormalities  Extremities: Warm. No pedal edema  Skin: No lesions noted  Vascular: No evidence of phlebitis  Neuro: Alert, oriented to time/place(does not know hospital name)/person; obviously in encephalopathy    Vital Signs Last 24 Hrs  T(F): 98.3 (07-29-20 @ 09:27), Max: 98.7 (07-24-20 @ 22:00)    Vital Signs Last 24 Hrs  HR: 96 (07-29-20 @ 09:27) (74 - 96)  BP: 120/65 (07-29-20 @ 09:27) (116/74 - 158/72)  RR: 18 (07-29-20 @ 09:27)  SpO2: 98% (07-29-20 @ 09:27) (78% - 100%)  Wt(kg): --    Labs:                        8.4    3.72  )-----------( 112      ( 29 Jul 2020 05:53 )             24.7     07-29    133<L>  |  100  |  27<H>  ----------------------------<  98  4.6   |  23  |  1.33<H>    Ca    9.3      29 Jul 2020 05:53  Phos  3.4     07-29  Mg     1.8     07-29    Liver Biochemical Testing Trend:  Aspartate Aminotransferase (AST/SGOT): 16 (07-29-20 @ 05:53)  Alanine Aminotransferase (ALT/SGPT): 15 (07-29-20 @ 05:53)  Bilirubin Direct, Serum: 0.3 (07-29-20 @ 05:53)  Aspartate Aminotransferase (AST/SGOT): 15 (07-28-20 @ 06:15)  Alanine Aminotransferase (ALT/SGPT): 16 (07-28-20 @ 06:15)  Bilirubin Direct, Serum: 0.3 (07-28-20 @ 06:15)    WBC Count: 3.72 (07-29-20 @ 05:53)  WBC Count: 3.92 (07-28-20 @ 18:33)  WBC Count: 4.13 (07-28-20 @ 06:15)  WBC Count: 3.46 (07-27-20 @ 05:26)  WBC Count: 3.57 (07-26-20 @ 06:03)  WBC Count: 3.65 (07-25-20 @ 06:00)  WBC Count: 3.88 (07-24-20 @ 05:54)  WBC Count: 3.79 (07-23-20 @ 05:48)      Creatinine, Serum: 1.33 (07-29)  Creatinine, Serum: 1.31 (07-28)  Creatinine, Serum: 1.41 (07-27)  Creatinine, Serum: 1.36 (07-26)  Creatinine, Serum: 1.52 (07-25)  Creatinine, Serum: 1.49 (07-24)  Creatinine, Serum: 1.80 (07-23)    MICROBIOLOGY:    Culture - Blood (07.10.20 @ 00:22)    -  Daptomycin: S 4    -  Vancomycin: R >16    Gram Stain:   Growth in aerobic bottle: Gram positive cocci in pairs    -  Linezolid: S 1    -  Ampicillin: R >8 Predicts results to ampicillin/sulbactam, amoxacillin-clavulanate and  piperacillin-tazobactam.    -  Gentamicin synergy: S    -  Streptomycin synergy: R    -  Vancomycin resistant Enterococcus sp.: Detec    Specimen Source: .Blood Blood-Peripheral    Organism: Enterococcus faecium (vancomycin resistant)    Organism: Blood Culture PCR    Culture Results:   Growth in aerobic bottle: Enterococcus faecium (vancomycin resistant)  "Due to technical problems, Proteus sp. will Not be reported as part of  the BCID panel until further notice"    RADIOLOGY:  imaging below personally reviewed    OTHER TESTS:  COVID-19 PCR: NotDetec (07-24-20 @ 06:44)  Procalcitonin, Serum: 0.21 (07-28 @ 06:15) Follow Up:  post-OLT on 7/2, Blood Cultures (7/10) with VRE    Interval History/ROS:Patient is a 64y old  Male who presents with a chief complaint of Liver failure, hepatic encephalopathy (28 Jul 2020 13:06)  - Observed PCA feeding him eating, he was closing his eyes but was able to eat. Very difficult to wake him up from opening his eyes. Did not answer me his name while he used to be able to do so.    Allergies  codeine (Anaphylaxis)    ANTIMICROBIALS:    nystatin    Suspension [id number] four times a day  trimethoprim   80 mG/sulfamethoxazole 400 mG 1 daily  valGANciclovir 900 daily    OTHER MEDS: MEDICATIONS  (STANDING):  aspirin enteric coated 81 daily  everolimus (ZORTRESS) 2 <User Schedule>  haloperidol    Injectable 0.5 every 12 hours PRN  insulin lispro (HumaLOG) corrective regimen sliding scale  Before meals and at bedtime  mycophenolate mofetil 750 <User Schedule>  pantoprazole    Tablet 40 before breakfast  predniSONE   Tablet 5 daily  tamsulosin 0.8 at bedtime  traZODone 50 at bedtime      Vital Signs Last 24 Hrs  T(F): 97.6 (07-28-20 @ 14:00), Max: 98.7 (07-24-20 @ 22:00)    Vital Signs Last 24 Hrs  HR: 83 (07-28-20 @ 14:00) (81 - 95)  BP: 135/65 (07-28-20 @ 14:00) (110/70 - 148/78)  RR: 18 (07-28-20 @ 14:00)  SpO2: 96% (07-28-20 @ 14:00) (94% - 100%)  Wt(kg): --    EXAM:  Constitutional: Not in acute distress. Sleeping, jerking, not waking up.  Eyes: pupils bilaterally reactive to light. No icterus.  Oral cavity: Clear, no lesions  Neck: No neck vein distension noted  RS: Chest clear to auscultation bilaterally. No wheeze/rhonchi/crepitations.  CVS: S1, S2 heard. Regular rate and rhythm. No murmurs/rubs/gallops.  Abdomen: Liver transplant suture clean+. Soft. No guarding/rigidity/tenderness. Left groin suture clean+  : No acute abnormalities  Extremities: Warm. No pedal edema  Skin: No lesions noted  Vascular: No evidence of phlebitis  Neuro: A&O*0    Vital Signs Last 24 Hrs  T(F): 98.3 (07-29-20 @ 09:27), Max: 98.7 (07-24-20 @ 22:00)    Vital Signs Last 24 Hrs  HR: 96 (07-29-20 @ 09:27) (74 - 96)  BP: 120/65 (07-29-20 @ 09:27) (116/74 - 158/72)  RR: 18 (07-29-20 @ 09:27)  SpO2: 98% (07-29-20 @ 09:27) (78% - 100%)  Wt(kg): --    Labs:                        8.4    3.72  )-----------( 112      ( 29 Jul 2020 05:53 )             24.7     07-29    133<L>  |  100  |  27<H>  ----------------------------<  98  4.6   |  23  |  1.33<H>    Ca    9.3      29 Jul 2020 05:53  Phos  3.4     07-29  Mg     1.8     07-29    Liver Biochemical Testing Trend:  Aspartate Aminotransferase (AST/SGOT): 16 (07-29-20 @ 05:53)  Alanine Aminotransferase (ALT/SGPT): 15 (07-29-20 @ 05:53)  Bilirubin Direct, Serum: 0.3 (07-29-20 @ 05:53)  Aspartate Aminotransferase (AST/SGOT): 15 (07-28-20 @ 06:15)  Alanine Aminotransferase (ALT/SGPT): 16 (07-28-20 @ 06:15)  Bilirubin Direct, Serum: 0.3 (07-28-20 @ 06:15)    WBC Count: 3.72 (07-29-20 @ 05:53)  WBC Count: 3.92 (07-28-20 @ 18:33)  WBC Count: 4.13 (07-28-20 @ 06:15)  WBC Count: 3.46 (07-27-20 @ 05:26)  WBC Count: 3.57 (07-26-20 @ 06:03)  WBC Count: 3.65 (07-25-20 @ 06:00)  WBC Count: 3.88 (07-24-20 @ 05:54)  WBC Count: 3.79 (07-23-20 @ 05:48)      Creatinine, Serum: 1.33 (07-29)  Creatinine, Serum: 1.31 (07-28)  Creatinine, Serum: 1.41 (07-27)  Creatinine, Serum: 1.36 (07-26)  Creatinine, Serum: 1.52 (07-25)  Creatinine, Serum: 1.49 (07-24)  Creatinine, Serum: 1.80 (07-23)    MICROBIOLOGY:    Culture - Blood (07.10.20 @ 00:22)    -  Daptomycin: S 4    -  Vancomycin: R >16    Gram Stain:   Growth in aerobic bottle: Gram positive cocci in pairs    -  Linezolid: S 1    -  Ampicillin: R >8 Predicts results to ampicillin/sulbactam, amoxacillin-clavulanate and  piperacillin-tazobactam.    -  Gentamicin synergy: S    -  Streptomycin synergy: R    -  Vancomycin resistant Enterococcus sp.: Detec    Specimen Source: .Blood Blood-Peripheral    Organism: Enterococcus faecium (vancomycin resistant)    Organism: Blood Culture PCR    Culture Results:   Growth in aerobic bottle: Enterococcus faecium (vancomycin resistant)  "Due to technical problems, Proteus sp. will Not be reported as part of  the BCID panel until further notice"    RADIOLOGY:  imaging below personally reviewed    OTHER TESTS:  COVID-19 PCR: NotDetec (07-24-20 @ 06:44)  Procalcitonin, Serum: 0.21 (07-28 @ 06:15)

## 2020-07-29 NOTE — PROGRESS NOTE ADULT - ATTENDING COMMENTS
64M PMH JEAN cirrhosis s/p OLT on 7/2/20  Post-Op Course Complicated by Encephalopathy and Tremors  Blood Cultures (7/10) with VRE  CT C/A/P (7/11) without obvious intraabdominal source.  Hypoxia prior to positive BCx which may be related to sepsis   Had L groin fluid collection which appears to be consistent with seroma - sample obtained for culture  TTE limited but without obvious vegetations  Completed 2 week course of antibiotics from negative cultures for VRE Bacteremia (End Date: 7/26)    Continued encephalopathy and tremor  U/A from 7/20 without pyuria  COVID19 PCR 7/24 negative  Blood Cultures 7/20 NGTD    Encephalitis or infectious etiology seems less likely  Switched from Cyclosporine to Everolimus on 7/27 to avoid CNI CNS Toxicity - would monitor mental status   Would recommend CNS imaging if encephalopathy does not improve (would likely require sedation)   If encephalopathy does not improve and CNS imaging unrevealing would pursue LP    I will continue to follow. Please feel free to contact me with any further questions.    Eusebio Pérez M.D.  [facility] Division of Infectious Disease  8AM-5PM: Pager Number [phone]  After Hours (or if no response): Please contact the Infectious Diseases Office at [phone]     The above assessment and plan were discussed with Xiao, Transplant Surgery NP

## 2020-07-29 NOTE — PROGRESS NOTE ADULT - SUBJECTIVE AND OBJECTIVE BOX
Chief Complaint:  Patient is a 64y old  Male who presents with a chief complaint of Liver failure, hepatic encephalopathy (2020 16:56)    Reason for consult: post OLT    Interval Events: no events    Hospital Medications:  aspirin enteric coated 81 milliGRAM(s) Oral daily  chlorhexidine 2% Cloths 1 Application(s) Topical <User Schedule>  collagenase Ointment 1 Application(s) Topical daily  everolimus (ZORTRESS) 2 milliGRAM(s) Oral <User Schedule>  haloperidol    Injectable 0.5 milliGRAM(s) IV Push every 12 hours PRN  insulin lispro (HumaLOG) corrective regimen sliding scale   SubCutaneous Before meals and at bedtime  magnesium oxide 800 milliGRAM(s) Oral two times a day with meals  magnesium sulfate  IVPB 1 Gram(s) IV Intermittent once  mycophenolate mofetil 750 milliGRAM(s) Oral <User Schedule>  nystatin    Suspension 128824 Unit(s) Oral four times a day  nystatin Cream 1 Application(s) Topical two times a day  pantoprazole    Tablet 40 milliGRAM(s) Oral before breakfast  predniSONE   Tablet 5 milliGRAM(s) Oral daily  sodium chloride 0.9%. 1000 milliLiter(s) IV Continuous <Continuous>  tamsulosin 0.8 milliGRAM(s) Oral at bedtime  traZODone 50 milliGRAM(s) Oral at bedtime  trimethoprim   80 mG/sulfamethoxazole 400 mG 1 Tablet(s) Oral daily  valGANciclovir 900 milliGRAM(s) Oral daily      ROS: Pt unable to provide    PHYSICAL EXAM:   Vital Signs:  Vital Signs Last 24 Hrs  T(C): 36.8 (2020 09:27), Max: 36.8 (2020 16:55)  T(F): 98.3 (2020 09:27), Max: 98.3 (2020 09:27)  HR: 96 (2020 09:27) (74 - 96)  BP: 120/65 (2020 09:27) (116/74 - 158/72)  BP(mean): 86 (2020 09:27) (86 - 89)  RR: 18 (2020 09:27) (18 - 18)  SpO2: 98% (2020 09:27) (78% - 100%)  Daily     Daily Weight in k.1 (2020 06:00)    GENERAL: no acute distress  NEURO: confused, + tremulous  HEENT: anicteric sclera, no conjunctival pallor appreciated  CHEST: no respiratory distress, no accessory muscle use  CARDIAC: regular rate, rhythm  ABDOMEN: soft, non-tender, non-distended, no rebound or guarding  EXTREMITIES: warm, well perfused, no edema  SKIN: no lesions noted    LABS: reviewed                        8.4    3.72  )-----------( 112      ( 2020 05:53 )             24.7     -    133<L>  |  100  |  27<H>  ----------------------------<  98  4.6   |  23  |  1.33<H>    Ca    9.3      2020 05:53  Phos  3.4       Mg     1.8         TPro  5.7<L>  /  Alb  3.6  /  TBili  0.7  /  DBili  0.3<H>  /  AST  16  /  ALT  15  /  AlkPhos  96      LIVER FUNCTIONS - ( 2020 05:53 )  Alb: 3.6 g/dL / Pro: 5.7 g/dL / ALK PHOS: 96 U/L / ALT: 15 U/L / AST: 16 U/L / GGT: x             Interval Diagnostic Studies: see sunrise for full report

## 2020-07-29 NOTE — PROGRESS NOTE ADULT - SUBJECTIVE AND OBJECTIVE BOX
INTERVAL HPI/OVERNIGHT EVENTS:    patient seen and examined   more awake today, less tremulous   tolerated PO with assistance as per PCA   small, loose bm this morning   hgb stable       MEDICATIONS  (STANDING):  aspirin enteric coated 81 milliGRAM(s) Oral daily  chlorhexidine 2% Cloths 1 Application(s) Topical <User Schedule>  collagenase Ointment 1 Application(s) Topical daily  furosemide    Tablet 80 milliGRAM(s) Oral every 8 hours  insulin glargine Injectable (LANTUS) 16 Unit(s) SubCutaneous at bedtime  insulin lispro (HumaLOG) corrective regimen sliding scale   SubCutaneous three times a day before meals  insulin lispro (HumaLOG) corrective regimen sliding scale   SubCutaneous at bedtime  insulin lispro Injectable (HumaLOG) 10 Unit(s) SubCutaneous before dinner  insulin lispro Injectable (HumaLOG) 8 Unit(s) SubCutaneous before breakfast  insulin lispro Injectable (HumaLOG) 8 Unit(s) SubCutaneous before lunch  linezolid  IVPB 600 milliGRAM(s) IV Intermittent every 12 hours  magnesium oxide 800 milliGRAM(s) Oral two times a day with meals  meropenem  IVPB 1000 milliGRAM(s) IV Intermittent every 8 hours  nystatin    Suspension 210944 Unit(s) Swish and Swallow four times a day  nystatin Cream 1 Application(s) Topical two times a day  pantoprazole  Injectable 40 milliGRAM(s) IV Push every 12 hours  polyethylene glycol 3350 17 Gram(s) Oral daily  predniSONE   Tablet 5 milliGRAM(s) Oral daily  senna 2 Tablet(s) Oral at bedtime  tacrolimus 3 milliGRAM(s) Oral <User Schedule>  tacrolimus 2 milliGRAM(s) Oral <User Schedule>  tamsulosin 0.8 milliGRAM(s) Oral at bedtime  trimethoprim   80 mG/sulfamethoxazole 400 mG 1 Tablet(s) Oral daily  valGANciclovir 450 milliGRAM(s) Oral daily    MEDICATIONS  (PRN):  glucagon  Injectable 1 milliGRAM(s) IntraMuscular once PRN Glucose <70 milliGRAM(s)/deciLiter      Allergies    codeine (Anaphylaxis)    Intolerances        Review of Systems: unable to obtain           Vital Signs Last 24 Hrs  T(C): 36.3 (12 Jul 2020 15:00), Max: 36.8 (11 Jul 2020 19:00)  T(F): 97.3 (12 Jul 2020 15:00), Max: 98.2 (11 Jul 2020 19:00)  HR: 80 (12 Jul 2020 15:00) (72 - 83)  BP: 109/59 (12 Jul 2020 15:00) (94/55 - 154/68)  BP(mean): 78 (12 Jul 2020 15:00) (70 - 125)  RR: 15 (12 Jul 2020 15:00) (9 - 28)  SpO2: 96% (12 Jul 2020 15:00) (96% - 100%)    PHYSICAL EXAM:    Constitutional: NAD   HEENT: sclera anicteric   Neck: No LAD, supple  Gastrointestinal: BS+, soft, NT, ND, incisions c/d/i  Extremities: +b/l peripheral edema  Neurological: confused, tremulous     LABS:                        9.3    5.51  )-----------( 98       ( 12 Jul 2020 01:54 )             28.6     07-12    131<L>  |  95<L>  |  31<H>  ----------------------------<  217<H>  5.0   |  27  |  1.26    Ca    8.1<L>      12 Jul 2020 12:22  Phos  4.0     07-12  Mg     2.0     07-12    TPro  4.4<L>  /  Alb  2.6<L>  /  TBili  1.1  /  DBili  0.5<H>  /  AST  64<H>  /  ALT  98<H>  /  AlkPhos  212<H>  07-12    PT/INR - ( 12 Jul 2020 01:54 )   PT: 12.7 sec;   INR: 1.12 ratio         PTT - ( 12 Jul 2020 01:54 )  PTT:28.7 sec      RADIOLOGY & ADDITIONAL TESTS:

## 2020-07-29 NOTE — PROGRESS NOTE ADULT - ASSESSMENT
65 y/o M PMHx decompensated JEAN Cirrhosis on transplant list, DMII, HFpEF, recent admission for ESBL E coli prostatic abscess 2/2 4 wks ertapenem, hx of ESBL UTIs with prostate abscess s/p IV abx, recent VRE urinary colonization came to hospital for worsening jaundice and episode of confusion, resolved PTA. s/p treatment course for possible VRE UTI. Now s/p OLT on 7/2/20. Course complicated by Encephalopathy and Tremors. Blood Cultures (7/10) with VRE sensitive to Linezolid. CT C/A/P without obvious intraabdominal source. Hypoxia prior to positive BCx which may be related to sepsis CT Chest (7/11) without evidence of pneumonia. Had L groin fluid collection which appears to be consistent with seroma - sample obtained for culture also negative. No drainage, erythema or underlying fluctuance was appreciated at the right heel. MRI prior to transplant without obvious infection. Transferred from SICU to [facility] on 7/17/2020.    Currently on:  nystatin    Suspension 634054 four times a day  trimethoprim   80 mG/sulfamethoxazole 400 mG 1 daily  valGANciclovir 900 daily    #VRE Bacteremia, L Groin Collection, Encephalopathy  - s/p Linezolid and Daptomycin ended on 7/26 for VRE bacteremia post-OLT  - Abd US showed 9cm Left groin collection: likely represents a seroma or lymphocele; aspirated - cx: PMN seen, no organism seen  - Continue monitoring cylosporine levels  - TTE negative for endocarditis  - UA/UC on 7/20 no UTI  - BCx on 7/20 NGTD  - Doubt if he has true UTI or prostate infection and his UC on 7/20 also no growth  - Switched to Everolimus on 7/27 to avoid CNI CNS Toxicity - would monitor mental status   - Would recommend CNS imaging given persistent encephalopathy   - If encephalopathy does not improve and CNS imaging unrevealing would pursue LP    #Hypoxic Respiratory Failure, Encephalopathy  - No evidence of pneumonia and collection in left groin appears consistent with seroma  - now off the Meropenem    #s/p OLT   - Continue bactrim, valcyte (CMV D+/R-) for prophylaxis  - Monitor patient for tremors - was on tacrolimus(CI), then cylosporine(CI), now on Everolimus(mTor I)    #Right heel wound  --Podiatry on board    --Local wound care     #PENDING RECS. PLEASE WAIT FOR FINAL RECS AFTER DISCUSSION WITH ATTENDING#    Suyapa Mayer MD, PGY4   ID fellow  Pager: [phone]  After 5pm/weekends call [phone] 65 y/o M PMHx decompensated JEAN Cirrhosis on transplant list, DMII, HFpEF, recent admission for ESBL E coli prostatic abscess 2/2 4 wks ertapenem, hx of ESBL UTIs with prostate abscess s/p IV abx, recent VRE urinary colonization came to hospital for worsening jaundice and episode of confusion, resolved PTA. s/p treatment course for possible VRE UTI. Now s/p OLT on 7/2/20. Course complicated by Encephalopathy and Tremors. Blood Cultures (7/10) with VRE sensitive to Linezolid. CT C/A/P without obvious intraabdominal source. Hypoxia prior to positive BCx which may be related to sepsis CT Chest (7/11) without evidence of pneumonia. Had L groin fluid collection which appears to be consistent with seroma - sample obtained for culture also negative. No drainage, erythema or underlying fluctuance was appreciated at the right heel. MRI prior to transplant without obvious infection. Transferred from SICU to [facility] on 7/17/2020.    Currently on:  nystatin    Suspension 443378 four times a day  trimethoprim   80 mG/sulfamethoxazole 400 mG 1 daily  valGANciclovir 900 daily    #VRE Bacteremia, L Groin Collection, Encephalopathy  - s/p Linezolid and Daptomycin ended on 7/26 for VRE bacteremia post-OLT  - Abd US showed 9cm Left groin collection: likely represents a seroma or lymphocele; aspirated - cx: PMN seen, no organism seen  - Continue monitoring cylosporine levels  - TTE negative for endocarditis  - UA/UC on 7/20 no UTI  - BCx on 7/20 NGTD  - Doubt if he has true UTI or prostate infection and his UC on 7/20 also no growth  - Switched to Everolimus on 7/27 to avoid CNI CNS Toxicity - would monitor mental status   - Would recommend CNS imaging given persistent encephalopathy and neurology consult   - If encephalopathy does not improve and CNS imaging unrevealing would pursue LP    #Hypoxic Respiratory Failure, Encephalopathy  - No evidence of pneumonia and collection in left groin appears consistent with seroma  - now off the Meropenem    #s/p OLT   - Continue bactrim, valcyte (CMV D+/R-) for prophylaxis  - Monitor patient for tremors - was on tacrolimus(CI), then cylosporine(CI), now on Everolimus(mTor I)    #Right heel wound  --Podiatry on board    --Local wound care     Suyapa Mayer MD, PGY4   ID fellow  Pager: [phone]  After 5pm/weekends call [phone]    d/w Dr. Pérez

## 2020-07-29 NOTE — PROGRESS NOTE ADULT - ASSESSMENT
64 M hx of DM, HLD, GERD, HFpEF with mild LV diastolic dysfunction, and decompensated JEAN cirrhosis c/b ascites with hx of SBP, HE, duodenal ulcer, GAVE and duodenal AVM s/p APC (last on 10/11/19), s/p liver transplant this admission (7/2/20). Post-op course c/b tacrolimus toxicity and hospital derlirium.      Impression:  #S/p liver transplant 7/2: liver enzymes continue to downtrend  OR details:  - L groin cutdown for vv bypass   - anastomosis: bicaval end-end/CBD duct-duct/HA & PV end-end, all standard anatomy.    - IOC with good flow  - Simulect induction. 500mg Solumedrol  - JPs x3 with T-Tube  - 14U pRBC, 14U FFP, 10 PLT, 11 CRYO, 500mL returned from cell saver, EBL 5L, UOP 1100mL  Recipient Info:   ABO: A  CMV:  Neg  EBV Positive  Donor:  Donor ID:  [id number] Match: [id number]  Age: 29 ABO:  A1 High Risk:   No COD: Cardiovascular  Anti CMV positive, EBV IgG- positive  HepBcAb-neg, Hepatitis C-DANA- neg, Hepatitis C ab-neg  #Halluciniations/agitation/AMS: worse at night. now over sedated. Getting significant chemical sedation prior. At times combative. Likely secondary to sleep wake cycle disturbances vs. ICU delerium vs. medication induced. On Seroquel, mirtazapine, trazodone, and melatonin at night during hositap course. Olanzapine added PRN per Behavioral Health on 7/18/20. Now limiting meds to avoid further delerium and side effect risks  #Tremors: Involving bilateral UE - concern for CNI toxicity requiring dose adjustments per Transplant Surgery.   #Sacral decubitus ulcer: does not appear infected  #GI bleed: Resolved. Hgb stable with no overt bleeding post-transplant. 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 GAVE and PHG likely improved pos ttransplant.  #R posterior heel wound w/ overlying eschar –Local wound care. no signs of infections, XR neg for gas, evaluated by podiatry and ID. MRI w/o contrast limited, but no overt signs of active infection.  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     Recommendation:  - continue with everolimus  - frequent reorientation and visitation   - normalized sleep/wake cycle environment  - haldol as needed, trazodone qHS (decrease dose to 50mg qhs)  - give albumin for dehydration  - continue prednisone 5 mg PO daily  - continue Cellcept (currently on 750 mg BID) - dosing as per transplant surgery  - abx per ID team  - continue nystatin, Bactrim and Valcyte ppx  - physical therapy daily to get OOB and ambulating  - continue with mag oxide 200 mg BID  - monitor Hb on daily CBC - continue PO PPI BID, monitor bowel movements  - continue to hold diuretics  - follow up Wound care recommendations for R heel ulcer and sacral decub  - follow up other Transplant surgery recommendations       Ze Dominguez  Gastroenterology Fellow  AT NIGHT AND ON WEEKENDS:  Contact on-call GI fellow via answering service ([phone]) from 5pm-8am and on weekends/holidays  MONDAY-FRIDAY 8AM-5PM:  Available via [employer] Teams  Call [phone] ([facility]) or Page [phone] ([facility]) from 8am-5pm M-F

## 2020-07-29 NOTE — PROGRESS NOTE ADULT - ATTENDING COMMENTS
Hepatology Staff: Yomi Medina MD    I saw and examined the patient along with Dr. Dominguez  07-29-20 @ 11:54.    Patient Medical Record, hosptial course was reviewed and summarized as below:    Hemodynamic Status:  Vitals: Vital Signs Last 24 Hrs  T(C): 36.8 (29 Jul 2020 09:27), Max: 36.8 (28 Jul 2020 16:55)  T(F): 98.3 (29 Jul 2020 09:27), Max: 98.3 (29 Jul 2020 09:27)  HR: 96 (29 Jul 2020 09:27) (74 - 96)  BP: 120/65 (29 Jul 2020 09:27) (116/74 - 158/72)  BP(mean): 86 (29 Jul 2020 09:27) (86 - 89)  RR: 18 (29 Jul 2020 09:27) (18 - 18)  SpO2: 98% (29 Jul 2020 09:27) (78% - 100%)    28 Jul 2020 07:01  -  29 Jul 2020 07:00  --------------------------------------------------------  IN: 530 mL / OUT: 600 mL / NET: -70 mL    29 Jul 2020 07:01  -  29 Jul 2020 11:54  --------------------------------------------------------  IN: 270 mL / OUT: 350 mL / NET: -80 mL      Nutritional Status: Albumin, Serum: 3.6 g/dL (07-29-20 @ 05:53)    Last 24 hour events:  Mentals status better    Recommendations:  Keep on Everolimus 2 mg bid (levels pending), and cont with CellCept 750 mg bid.  Trazadone 50 mg daily. Agree with the rest.     Plan discussed with Primary team.

## 2020-07-30 LAB
ALBUMIN SERPL ELPH-MCNC: 3.7 G/DL — SIGNIFICANT CHANGE UP (ref 3.3–5)
ALP SERPL-CCNC: 110 U/L — SIGNIFICANT CHANGE UP (ref 40–120)
ALT FLD-CCNC: 16 U/L — SIGNIFICANT CHANGE UP (ref 10–45)
ANION GAP SERPL CALC-SCNC: 12 MMOL/L — SIGNIFICANT CHANGE UP (ref 5–17)
APPEARANCE UR: CLEAR — SIGNIFICANT CHANGE UP
APTT BLD: 22.5 SEC — LOW (ref 27.5–35.5)
AST SERPL-CCNC: 13 U/L — SIGNIFICANT CHANGE UP (ref 10–40)
BACTERIA # UR AUTO: NEGATIVE — SIGNIFICANT CHANGE UP
BILIRUB DIRECT SERPL-MCNC: 0.3 MG/DL — HIGH (ref 0–0.2)
BILIRUB INDIRECT FLD-MCNC: 0.4 MG/DL — SIGNIFICANT CHANGE UP (ref 0.2–1)
BILIRUB SERPL-MCNC: 0.7 MG/DL — SIGNIFICANT CHANGE UP (ref 0.2–1.2)
BILIRUB UR-MCNC: NEGATIVE — SIGNIFICANT CHANGE UP
BUN SERPL-MCNC: 36 MG/DL — HIGH (ref 7–23)
CALCIUM SERPL-MCNC: 9.4 MG/DL — SIGNIFICANT CHANGE UP (ref 8.4–10.5)
CHLORIDE SERPL-SCNC: 107 MMOL/L — SIGNIFICANT CHANGE UP (ref 96–108)
CO2 SERPL-SCNC: 23 MMOL/L — SIGNIFICANT CHANGE UP (ref 22–31)
COLOR SPEC: SIGNIFICANT CHANGE UP
CREAT SERPL-MCNC: 1.62 MG/DL — HIGH (ref 0.5–1.3)
CYCLOSPORINE SER-MCNC: 64 NG/ML — LOW (ref 150–400)
DIFF PNL FLD: ABNORMAL
EPI CELLS # UR: 0 /HPF — SIGNIFICANT CHANGE UP
EVEROLIMUS, WHOLE BLOOD RESULT: 5.5 NG/ML — SIGNIFICANT CHANGE UP (ref 3–8)
GLUCOSE BLDC GLUCOMTR-MCNC: 138 MG/DL — HIGH (ref 70–99)
GLUCOSE BLDC GLUCOMTR-MCNC: 165 MG/DL — HIGH (ref 70–99)
GLUCOSE BLDC GLUCOMTR-MCNC: 168 MG/DL — HIGH (ref 70–99)
GLUCOSE BLDC GLUCOMTR-MCNC: 178 MG/DL — HIGH (ref 70–99)
GLUCOSE SERPL-MCNC: 137 MG/DL — HIGH (ref 70–99)
GLUCOSE UR QL: NEGATIVE — SIGNIFICANT CHANGE UP
HCT VFR BLD CALC: 26.2 % — LOW (ref 39–50)
HGB BLD-MCNC: 8.8 G/DL — LOW (ref 13–17)
HYALINE CASTS # UR AUTO: 0 /LPF — SIGNIFICANT CHANGE UP (ref 0–2)
INR BLD: 1.04 RATIO — SIGNIFICANT CHANGE UP (ref 0.88–1.16)
KETONES UR-MCNC: NEGATIVE — SIGNIFICANT CHANGE UP
LEUKOCYTE ESTERASE UR-ACNC: NEGATIVE — SIGNIFICANT CHANGE UP
MAGNESIUM SERPL-MCNC: 2 MG/DL — SIGNIFICANT CHANGE UP (ref 1.6–2.6)
MCHC RBC-ENTMCNC: 32.4 PG — SIGNIFICANT CHANGE UP (ref 27–34)
MCHC RBC-ENTMCNC: 33.6 GM/DL — SIGNIFICANT CHANGE UP (ref 32–36)
MCV RBC AUTO: 96.3 FL — SIGNIFICANT CHANGE UP (ref 80–100)
NITRITE UR-MCNC: NEGATIVE — SIGNIFICANT CHANGE UP
NRBC # BLD: 0 /100 WBCS — SIGNIFICANT CHANGE UP (ref 0–0)
PH UR: 6 — SIGNIFICANT CHANGE UP (ref 5–8)
PHOSPHATE SERPL-MCNC: 3.5 MG/DL — SIGNIFICANT CHANGE UP (ref 2.5–4.5)
PLATELET # BLD AUTO: 128 K/UL — LOW (ref 150–400)
POTASSIUM SERPL-MCNC: 4.9 MMOL/L — SIGNIFICANT CHANGE UP (ref 3.5–5.3)
POTASSIUM SERPL-SCNC: 4.9 MMOL/L — SIGNIFICANT CHANGE UP (ref 3.5–5.3)
PROCALCITONIN SERPL-MCNC: 0.19 NG/ML — HIGH (ref 0.02–0.1)
PROT SERPL-MCNC: 6 G/DL — SIGNIFICANT CHANGE UP (ref 6–8.3)
PROT UR-MCNC: SIGNIFICANT CHANGE UP
PROTHROM AB SERPL-ACNC: 12.3 SEC — SIGNIFICANT CHANGE UP (ref 10.6–13.6)
RBC # BLD: 2.72 M/UL — LOW (ref 4.2–5.8)
RBC # FLD: 18.2 % — HIGH (ref 10.3–14.5)
RBC CASTS # UR COMP ASSIST: 4 /HPF — SIGNIFICANT CHANGE UP (ref 0–4)
SODIUM SERPL-SCNC: 142 MMOL/L — SIGNIFICANT CHANGE UP (ref 135–145)
SP GR SPEC: 1.02 — SIGNIFICANT CHANGE UP (ref 1.01–1.02)
UROBILINOGEN FLD QL: NEGATIVE — SIGNIFICANT CHANGE UP
WBC # BLD: 4.38 K/UL — SIGNIFICANT CHANGE UP (ref 3.8–10.5)
WBC # FLD AUTO: 4.38 K/UL — SIGNIFICANT CHANGE UP (ref 3.8–10.5)
WBC UR QL: 11 /HPF — HIGH (ref 0–5)

## 2020-07-30 PROCEDURE — 99232 SBSQ HOSP IP/OBS MODERATE 35: CPT

## 2020-07-30 PROCEDURE — 99232 SBSQ HOSP IP/OBS MODERATE 35: CPT | Mod: GC,24

## 2020-07-30 RX ORDER — EVEROLIMUS 10 MG/1
2.5 TABLET ORAL
Refills: 0 | Status: DISCONTINUED | OUTPATIENT
Start: 2020-07-30 | End: 2020-07-30

## 2020-07-30 RX ORDER — SODIUM CHLORIDE 9 MG/ML
500 INJECTION INTRAMUSCULAR; INTRAVENOUS; SUBCUTANEOUS ONCE
Refills: 0 | Status: COMPLETED | OUTPATIENT
Start: 2020-07-30 | End: 2020-07-30

## 2020-07-30 RX ORDER — EVEROLIMUS 10 MG/1
2.5 TABLET ORAL
Refills: 0 | Status: DISCONTINUED | OUTPATIENT
Start: 2020-07-30 | End: 2020-07-31

## 2020-07-30 RX ORDER — TRAZODONE HCL 50 MG
25 TABLET ORAL AT BEDTIME
Refills: 0 | Status: DISCONTINUED | OUTPATIENT
Start: 2020-07-30 | End: 2020-07-31

## 2020-07-30 RX ORDER — EVEROLIMUS 10 MG/1
0.5 TABLET ORAL ONCE
Refills: 0 | Status: COMPLETED | OUTPATIENT
Start: 2020-07-30 | End: 2020-07-30

## 2020-07-30 RX ORDER — VALGANCICLOVIR 450 MG/1
900 TABLET, FILM COATED ORAL DAILY
Refills: 0 | Status: DISCONTINUED | OUTPATIENT
Start: 2020-07-30 | End: 2020-08-04

## 2020-07-30 RX ADMIN — Medication 500000 UNIT(S): at 17:17

## 2020-07-30 RX ADMIN — PANTOPRAZOLE SODIUM 40 MILLIGRAM(S): 20 TABLET, DELAYED RELEASE ORAL at 06:20

## 2020-07-30 RX ADMIN — SODIUM CHLORIDE 75 MILLILITER(S): 9 INJECTION INTRAMUSCULAR; INTRAVENOUS; SUBCUTANEOUS at 11:58

## 2020-07-30 RX ADMIN — NYSTATIN CREAM 1 APPLICATION(S): 100000 CREAM TOPICAL at 17:18

## 2020-07-30 RX ADMIN — TAMSULOSIN HYDROCHLORIDE 0.8 MILLIGRAM(S): 0.4 CAPSULE ORAL at 20:09

## 2020-07-30 RX ADMIN — MYCOPHENOLATE MOFETIL 750 MILLIGRAM(S): 250 CAPSULE ORAL at 08:34

## 2020-07-30 RX ADMIN — SODIUM CHLORIDE 500 MILLILITER(S): 9 INJECTION INTRAMUSCULAR; INTRAVENOUS; SUBCUTANEOUS at 13:17

## 2020-07-30 RX ADMIN — Medication 1: at 12:49

## 2020-07-30 RX ADMIN — Medication 81 MILLIGRAM(S): at 12:01

## 2020-07-30 RX ADMIN — Medication 1: at 21:45

## 2020-07-30 RX ADMIN — EVEROLIMUS 2 MILLIGRAM(S): 10 TABLET ORAL at 08:34

## 2020-07-30 RX ADMIN — EVEROLIMUS 0.5 MILLIGRAM(S): 10 TABLET ORAL at 08:43

## 2020-07-30 RX ADMIN — CHLORHEXIDINE GLUCONATE 1 APPLICATION(S): 213 SOLUTION TOPICAL at 08:34

## 2020-07-30 RX ADMIN — MAGNESIUM OXIDE 400 MG ORAL TABLET 800 MILLIGRAM(S): 241.3 TABLET ORAL at 17:17

## 2020-07-30 RX ADMIN — Medication 1: at 18:07

## 2020-07-30 RX ADMIN — Medication 25 MILLIGRAM(S): at 21:44

## 2020-07-30 RX ADMIN — Medication 1 APPLICATION(S): at 12:01

## 2020-07-30 RX ADMIN — HALOPERIDOL DECANOATE 0.5 MILLIGRAM(S): 100 INJECTION INTRAMUSCULAR at 20:32

## 2020-07-30 RX ADMIN — VALGANCICLOVIR 900 MILLIGRAM(S): 450 TABLET, FILM COATED ORAL at 12:00

## 2020-07-30 RX ADMIN — NYSTATIN CREAM 1 APPLICATION(S): 100000 CREAM TOPICAL at 06:21

## 2020-07-30 RX ADMIN — Medication 1 TABLET(S): at 12:01

## 2020-07-30 RX ADMIN — Medication 500000 UNIT(S): at 06:20

## 2020-07-30 RX ADMIN — Medication 500000 UNIT(S): at 12:00

## 2020-07-30 RX ADMIN — Medication 500000 UNIT(S): at 21:44

## 2020-07-30 RX ADMIN — MYCOPHENOLATE MOFETIL 750 MILLIGRAM(S): 250 CAPSULE ORAL at 20:08

## 2020-07-30 RX ADMIN — MAGNESIUM OXIDE 400 MG ORAL TABLET 800 MILLIGRAM(S): 241.3 TABLET ORAL at 08:34

## 2020-07-30 RX ADMIN — EVEROLIMUS 2.5 MILLIGRAM(S): 10 TABLET ORAL at 20:09

## 2020-07-30 NOTE — PROGRESS NOTE ADULT - SUBJECTIVE AND OBJECTIVE BOX
OLTx      Date:  7/2/2020       POD#27    Present: Patient seen with multidisciplinary team including Transplant Surgeon Dr. Ackerman. Nephrologist Dr. Rowland, renal fellow. Pharmacist: Ancelmo Blanca, ZBIGNIEW Hurley, OLY Burciaga, and unit RN during am rounds and examined by  Dr. Ackerman. Disciplines not in attendance will be notified of the plan.    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     Re-admitted on 6/11:   ·	worsening HE  ·	UTI/VRE: tx with linezolid 6/15-22, bactrim DS 6/22 - 7/2  ·	MISA/Hyponatremia  ·	UGI bleed (melena) s/p EGD (6/22) c/w hemorrhagic duodenitis/gastritis; Grade 2 EV; requiring multiple transfusions  ·	Known h/o R foot ulcer (MRI neg for OM)  ·	s/p OLTx on 7/2/2020, post op liver doppler with patent vessels  ·	post op course c/b delirium and VRE bacteremia (7/10)     Interval events:  - POD 28 s/p OLT with good graft function;  LFTs stable  - Received Trazadone 50mg at bedtime. Refused CPAP overnight, was confused/agitated. This AM, mental status improved, less agitated and more conversive   - Last recieved 1u PRBC on 7/28, H/H remains stable.   -Uptrending Cr, started on IVF at 75cc/hr yesterday. Cr up to 1.6 from 1.3 yesterday. UOP not recorded accurately as pt has several incontinent episodes. Bladder scan this AM w/ 165cc of urine.     Potential Discharge date: pending clinical improvement     Education:  Medications    Plan of care:  See Below      MEDICATIONS  (STANDING):  aspirin enteric coated 81 milliGRAM(s) Oral daily  chlorhexidine 2% Cloths 1 Application(s) Topical <User Schedule>  collagenase Ointment 1 Application(s) Topical daily  everolimus (ZORTRESS) 2.5 milliGRAM(s) Oral <User Schedule>  insulin lispro (HumaLOG) corrective regimen sliding scale   SubCutaneous Before meals and at bedtime  magnesium oxide 800 milliGRAM(s) Oral two times a day with meals  mycophenolate mofetil 750 milliGRAM(s) Oral <User Schedule>  nystatin    Suspension 191056 Unit(s) Oral four times a day  nystatin Cream 1 Application(s) Topical two times a day  pantoprazole    Tablet 40 milliGRAM(s) Oral before breakfast  predniSONE   Tablet 5 milliGRAM(s) Oral daily  sodium chloride 0.9%. 1000 milliLiter(s) (75 mL/Hr) IV Continuous <Continuous>  tamsulosin 0.8 milliGRAM(s) Oral at bedtime  traZODone 25 milliGRAM(s) Oral at bedtime  trimethoprim   80 mG/sulfamethoxazole 400 mG 1 Tablet(s) Oral daily  valGANciclovir 50 mG/mL Oral Solution 900 milliGRAM(s) Oral daily    MEDICATIONS  (PRN):  haloperidol    Injectable 0.5 milliGRAM(s) IV Push every 12 hours PRN Agitation      PAST MEDICAL & SURGICAL HISTORY:  GIB (gastrointestinal bleeding)  GERD with esophagitis: Gastritis &amp; Non Bleeding Ulcers  Hepatic encephalopathy  Obesity  Fatty liver disease, nonalcoholic  Renal stones: 25 years ago  Hypertension  Neuropathy  Hypercholesteremia  Diabetes  S/P cholecystectomy      Vital Signs Last 24 Hrs  T(C): 36.3 (30 Jul 2020 05:00), Max: 37.6 (29 Jul 2020 17:48)  T(F): 97.3 (30 Jul 2020 05:00), Max: 99.6 (29 Jul 2020 17:48)  HR: 89 (30 Jul 2020 09:48) (82 - 99)  BP: 140/73 (30 Jul 2020 05:00) (123/60 - 164/69)  BP(mean): --  RR: 18 (30 Jul 2020 09:48) (18 - 18)  SpO2: 98% (30 Jul 2020 09:48) (98% - 100%)    I&O's Summary    29 Jul 2020 07:01  -  30 Jul 2020 07:00  --------------------------------------------------------  IN: 2305 mL / OUT: 350 mL / NET: 1955 mL    30 Jul 2020 07:01  -  30 Jul 2020 10:36  --------------------------------------------------------  IN: 370 mL / OUT: 0 mL / NET: 370 mL                              8.8    4.38  )-----------( 128      ( 30 Jul 2020 06:26 )             26.2     07-30    142  |  107  |  36<H>  ----------------------------<  137<H>  4.9   |  23  |  1.62<H>    Ca    9.4      30 Jul 2020 06:26  Phos  3.5     07-30  Mg     2.0     07-30    TPro  6.0  /  Alb  3.7  /  TBili  0.7  /  DBili  0.3<H>  /  AST  13  /  ALT  16  /  AlkPhos  110  07-30      ROS  Unable to obtain due to AMS    Physical Exam  Constitutional: more awake and alert, calm, cooperative  Eyes: Anicteric, PERRLA  ENMT: nc/at  Neck: no central line, no JVD  Respiratory: CTA B/L, unlabored breathing   Cardiovascular: RRR  Gastrointestinal: soft, NT, ND, incision c/d/i, T-tube tied   Genitourinary: Voiding spontaneously  Extremities: heel wound without drainage, edema improving   Vascular: Palpable dp pulses bilaterally  Neurological: Alert, oriented x2-3 today, less tremors, TORREZ  Skin: no rashes  Musculoskeletal: Moving all extremities  Psychiatric: Responsive

## 2020-07-30 NOTE — PROVIDER CONTACT NOTE (OTHER) - BACKGROUND
S/p liver transplant 7/2.
s/p liver transplant
s/p liver transplant PMHx GERD, fatty liver disease, HTN, DMT2
s/p liver transplant PMHx GERD, fatty liver disease, HTN, DMT2

## 2020-07-30 NOTE — PROGRESS NOTE ADULT - ASSESSMENT
64 M hx of DM, HLD, GERD, HFpEF with mild LV diastolic dysfunction, and decompensated JEAN cirrhosis c/b ascites with hx of SBP, HE, duodenal ulcer, GAVE and duodenal AVM s/p APC (last on 10/11/19), s/p liver transplant this admission (7/2/20). Post-op course c/b tacrolimus toxicity and hospital derlirium.      Impression:  #S/p liver transplant 7/2: liver enzymes continue to downtrend  OR details:  - L groin cutdown for vv bypass   - anastomosis: bicaval end-end/CBD duct-duct/HA & PV end-end, all standard anatomy.    - IOC with good flow  - Simulect induction. 500mg Solumedrol  - JPs x3 with T-Tube  - 14U pRBC, 14U FFP, 10 PLT, 11 CRYO, 500mL returned from cell saver, EBL 5L, UOP 1100mL  Recipient Info:   ABO: A  CMV:  Neg  EBV Positive  Donor:  Donor ID:  [id number] Match: [id number]  Age: 29 ABO:  A1 High Risk:   No COD: Cardiovascular  Anti CMV positive, EBV IgG- positive  HepBcAb-neg, Hepatitis C-DANA- neg, Hepatitis C ab-neg    #Halluciniations/agitation/AMS/tremors – due to CNI toxicity, improving now that pt is off CNI and on everolimus  #Sacral decubitus ulcer: stage I, does not appear infected  #GI bleed: Resolved. Hgb stable with no overt bleeding post-transplant. 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 GAVE and PHG likely improved pos ttransplant.  #R posterior heel wound w/ overlying eschar –Local wound care. no signs of infections, XR neg for gas, evaluated by podiatry and ID. MRI w/o contrast limited, but no overt signs of active infection.  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     Recommendation:  - continue with everolimus dosed per transplant surgery (2.5mg BID), f/u levels  - continue Cellcept (currently on 750 mg BID) - dosing as per transplant surgery  - continue prednisone 5 mg PO daily  - c/w trazodone 25mg daily  - frequent reorientation and visitation   - normalized sleep/wake cycle environment  - continue nystatin, Bactrim and Valcyte ppx  - physical therapy daily to get OOB and ambulating  - calorie count  - continue with mag oxide 200 mg BID  - monitor Hb on daily CBC - continue PO PPI BID, monitor bowel movements  - c/w ASA 81mg daily  - continue to hold diuretics  - follow up Wound care recommendations for R heel ulcer and sacral decub  - follow up other Transplant surgery recommendations       Ze Dominguez  Gastroenterology Fellow  AT NIGHT AND ON WEEKENDS:  Contact on-call GI fellow via answering service ([phone]) from 5pm-8am and on weekends/holidays  MONDAY-FRIDAY 8AM-5PM:  Available via [employer] Teams  Call [phone] ([facility]) or Page [phone] ([facility]) from 8am-5pm M-F

## 2020-07-30 NOTE — PROGRESS NOTE ADULT - SUBJECTIVE AND OBJECTIVE BOX
Follow Up:  post-OLT on 7/2, Blood Cultures (7/10) with VRE    Interval History/ROS:Patient is a 64y old  Male who presents with a chief complaint of Liver failure, hepatic encephalopathy (28 Jul 2020 13:06)  - Observed PCA feeding him eating, he was closing his eyes but was able to eat. Very difficult to wake him up from opening his eyes. Did not answer me his name while he used to be able to do so.    Allergies  codeine (Anaphylaxis)    ANTIMICROBIALS:    nystatin    Suspension 222032 four times a day  trimethoprim   80 mG/sulfamethoxazole 400 mG 1 daily  valGANciclovir 50 mG/mL Oral Solution 900 daily    OTHER MEDS: MEDICATIONS  (STANDING):  aspirin enteric coated 81 daily  everolimus (ZORTRESS) 2.5 <User Schedule>  haloperidol    Injectable 0.5 every 12 hours PRN  insulin lispro (HumaLOG) corrective regimen sliding scale  Before meals and at bedtime  mycophenolate mofetil 750 <User Schedule>  pantoprazole    Tablet 40 before breakfast  predniSONE   Tablet 5 daily  tamsulosin 0.8 at bedtime  traZODone 25 at bedtime    Vital Signs Last 24 Hrs  T(F): 97.3 (07-30-20 @ 05:00), Max: 99.6 (07-29-20 @ 17:48)    Vital Signs Last 24 Hrs  HR: 89 (07-30-20 @ 09:48) (82 - 99)  BP: 140/73 (07-30-20 @ 05:00) (123/60 - 164/69)  RR: 18 (07-30-20 @ 09:48)  SpO2: 98% (07-30-20 @ 09:48) (98% - 100%)  Wt(kg): --    EXAM:  Constitutional: Not in acute distress. Sleeping, jerking, not waking up.  Eyes: pupils bilaterally reactive to light. No icterus.  Oral cavity: Clear, no lesions  Neck: No neck vein distension noted  RS: Chest clear to auscultation bilaterally. No wheeze/rhonchi/crepitations.  CVS: S1, S2 heard. Regular rate and rhythm. No murmurs/rubs/gallops.  Abdomen: Liver transplant suture clean+. Soft. No guarding/rigidity/tenderness. Left groin suture clean+  : No acute abnormalities  Extremities: Warm. No pedal edema  Skin: No lesions noted  Vascular: No evidence of phlebitis  Neuro: A&O*0    Labs:                        8.8    4.38  )-----------( 128      ( 30 Jul 2020 06:26 )             26.2     07-30    142  |  107  |  36<H>  ----------------------------<  137<H>  4.9   |  23  |  1.62<H>    Ca    9.4      30 Jul 2020 06:26  Phos  3.5     07-30  Mg     2.0     07-30    TPro  6.0  /  Alb  3.7  /  TBili  0.7  /  DBili  0.3<H>  /  AST  13  /  ALT  16  /  AlkPhos  110  07-30      WBC Trend:  WBC Count: 4.38 (07-30-20 @ 06:26)  WBC Count: 3.72 (07-29-20 @ 05:53)  WBC Count: 3.92 (07-28-20 @ 18:33)  WBC Count: 4.13 (07-28-20 @ 06:15)  WBC Count: 3.46 (07-27-20 @ 05:26)  WBC Count: 3.57 (07-26-20 @ 06:03)    Creatine Trend:  Creatinine, Serum: 1.62 (07-30)  Creatinine, Serum: 1.33 (07-29)  Creatinine, Serum: 1.31 (07-28)  Creatinine, Serum: 1.41 (07-27)  Creatinine, Serum: 1.36 (07-26)  Creatinine, Serum: 1.52 (07-25)  Creatinine, Serum: 1.49 (07-24)    Liver Biochemical Testing Trend:  Alanine Aminotransferase (ALT/SGPT): 16 (07-30)  Alanine Aminotransferase (ALT/SGPT): 15 (07-29)  Alanine Aminotransferase (ALT/SGPT): 16 (07-28)  Aspartate Aminotransferase (AST/SGOT): 13 (07-30-20 @ 06:26)  Aspartate Aminotransferase (AST/SGOT): 16 (07-29-20 @ 05:53)  Aspartate Aminotransferase (AST/SGOT): 15 (07-28-20 @ 06:15)  Bilirubin Direct, Serum: 0.3 (07-30)  Bilirubin Total, Serum: 0.7 (07-30)  Bilirubin Direct, Serum: 0.3 (07-29)  Bilirubin Total, Serum: 0.7 (07-29)  Bilirubin Direct, Serum: 0.3 (07-28)  Bilirubin Total, Serum: 0.7 (07-28)        MICROBIOLOGY:    Culture - Blood (07.10.20 @ 00:22)    -  Daptomycin: S 4    -  Vancomycin: R >16    Gram Stain:   Growth in aerobic bottle: Gram positive cocci in pairs    -  Linezolid: S 1    -  Ampicillin: R >8 Predicts results to ampicillin/sulbactam, amoxacillin-clavulanate and  piperacillin-tazobactam.    -  Gentamicin synergy: S    -  Streptomycin synergy: R    -  Vancomycin resistant Enterococcus sp.: Detec    Specimen Source: .Blood Blood-Peripheral    Organism: Enterococcus faecium (vancomycin resistant)    Organism: Blood Culture PCR    Culture Results:   Growth in aerobic bottle: Enterococcus faecium (vancomycin resistant)  "Due to technical problems, Proteus sp. will Not be reported as part of  the BCID panel until further notice"    RADIOLOGY:  imaging below personally reviewed    OTHER TESTS:  COVID-19 PCR: NotDetec (07-24-20 @ 06:44)  Procalcitonin, Serum: 0.21 (07-28 @ 06:15) Follow Up:  post-OLT on 7/2, Blood Cultures (7/10) with VRE    Interval History/ROS:Patient is a 64y old  Male who presents with a chief complaint of Liver failure, hepatic encephalopathy (28 Jul 2020 13:06)  - Mental status slightly improve from yesterday, A&O*2 today, but still very tremerous  - Cyclosporine level=64    Allergies  codeine (Anaphylaxis)    ANTIMICROBIALS:    nystatin    Suspension 177097 four times a day  trimethoprim   80 mG/sulfamethoxazole 400 mG 1 daily  valGANciclovir 50 mG/mL Oral Solution 900 daily    OTHER MEDS: MEDICATIONS  (STANDING):  aspirin enteric coated 81 daily  everolimus (ZORTRESS) 2.5 <User Schedule>  haloperidol    Injectable 0.5 every 12 hours PRN  insulin lispro (HumaLOG) corrective regimen sliding scale  Before meals and at bedtime  mycophenolate mofetil 750 <User Schedule>  pantoprazole    Tablet 40 before breakfast  predniSONE   Tablet 5 daily  tamsulosin 0.8 at bedtime  traZODone 25 at bedtime    Vital Signs Last 24 Hrs  T(F): 97.3 (07-30-20 @ 05:00), Max: 99.6 (07-29-20 @ 17:48)    Vital Signs Last 24 Hrs  HR: 89 (07-30-20 @ 09:48) (82 - 99)  BP: 140/73 (07-30-20 @ 05:00) (123/60 - 164/69)  RR: 18 (07-30-20 @ 09:48)  SpO2: 98% (07-30-20 @ 09:48) (98% - 100%)  Wt(kg): --    EXAM:  Constitutional: Not in acute distress. Sleeping, jerking, not waking up.  Eyes: pupils bilaterally reactive to light. No icterus.  Oral cavity: Clear, no lesions  Neck: No neck vein distension noted  RS: Chest clear to auscultation bilaterally. No wheeze/rhonchi/crepitations.  CVS: S1, S2 heard. Regular rate and rhythm. No murmurs/rubs/gallops.  Abdomen: Liver transplant suture clean+. Soft. No guarding/rigidity/tenderness. Left groin suture clean+  : No acute abnormalities  Extremities: Warm. No pedal edema  Skin: No lesions noted  Vascular: No evidence of phlebitis  Neuro: A&O*2    Labs:                        8.8    4.38  )-----------( 128      ( 30 Jul 2020 06:26 )             26.2     07-30    142  |  107  |  36<H>  ----------------------------<  137<H>  4.9   |  23  |  1.62<H>    Ca    9.4      30 Jul 2020 06:26  Phos  3.5     07-30  Mg     2.0     07-30    TPro  6.0  /  Alb  3.7  /  TBili  0.7  /  DBili  0.3<H>  /  AST  13  /  ALT  16  /  AlkPhos  110  07-30      WBC Trend:  WBC Count: 4.38 (07-30-20 @ 06:26)  WBC Count: 3.72 (07-29-20 @ 05:53)  WBC Count: 3.92 (07-28-20 @ 18:33)  WBC Count: 4.13 (07-28-20 @ 06:15)  WBC Count: 3.46 (07-27-20 @ 05:26)  WBC Count: 3.57 (07-26-20 @ 06:03)    Creatine Trend:  Creatinine, Serum: 1.62 (07-30)  Creatinine, Serum: 1.33 (07-29)  Creatinine, Serum: 1.31 (07-28)  Creatinine, Serum: 1.41 (07-27)  Creatinine, Serum: 1.36 (07-26)  Creatinine, Serum: 1.52 (07-25)  Creatinine, Serum: 1.49 (07-24)    Liver Biochemical Testing Trend:  Alanine Aminotransferase (ALT/SGPT): 16 (07-30)  Alanine Aminotransferase (ALT/SGPT): 15 (07-29)  Alanine Aminotransferase (ALT/SGPT): 16 (07-28)  Aspartate Aminotransferase (AST/SGOT): 13 (07-30-20 @ 06:26)  Aspartate Aminotransferase (AST/SGOT): 16 (07-29-20 @ 05:53)  Aspartate Aminotransferase (AST/SGOT): 15 (07-28-20 @ 06:15)  Bilirubin Direct, Serum: 0.3 (07-30)  Bilirubin Total, Serum: 0.7 (07-30)  Bilirubin Direct, Serum: 0.3 (07-29)  Bilirubin Total, Serum: 0.7 (07-29)  Bilirubin Direct, Serum: 0.3 (07-28)  Bilirubin Total, Serum: 0.7 (07-28)        MICROBIOLOGY:    Culture - Blood (07.10.20 @ 00:22)    -  Daptomycin: S 4    -  Vancomycin: R >16    Gram Stain:   Growth in aerobic bottle: Gram positive cocci in pairs    -  Linezolid: S 1    -  Ampicillin: R >8 Predicts results to ampicillin/sulbactam, amoxacillin-clavulanate and  piperacillin-tazobactam.    -  Gentamicin synergy: S    -  Streptomycin synergy: R    -  Vancomycin resistant Enterococcus sp.: Detec    Specimen Source: .Blood Blood-Peripheral    Organism: Enterococcus faecium (vancomycin resistant)    Organism: Blood Culture PCR    Culture Results:   Growth in aerobic bottle: Enterococcus faecium (vancomycin resistant)  "Due to technical problems, Proteus sp. will Not be reported as part of  the BCID panel until further notice"    RADIOLOGY:  imaging below personally reviewed    OTHER TESTS:  COVID-19 PCR: NotDetec (07-24-20 @ 06:44)  Procalcitonin, Serum: 0.21 (07-28 @ 06:15)    Cyclosporine Level (07.30.20 @ 07:30)    Cyclosporine Level: 64    Cyclosporine Level (07.27.20 @ 09:00)    Cyclosporine Level: 272

## 2020-07-30 NOTE — PROGRESS NOTE ADULT - ASSESSMENT
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 c/b VRE bactermia and delerium    [] POD 28 s/p OLT   - Good graft function, LFTs stable   - Immuno: [place] DCd 7/19. Cyclosporine DCd 7/27.  Changed MTOR  inhibitor on 7/27 due to persistent delerium/AMS. Everolimus 2.5mg BID, MMF 750mg BID,  Pred 5mg daily  - Mental status improved this morning, was agitated and more confused overnight.  Will decrease Trazadone to 25mg qhs and CPAP as needed. PRN Haldol  - Daily Everolimus and Cyclosporine levels  - PPX: valcyte solution/nystatin/bactrim  -Resume IVF at 75cc/hr for dehydration  - Continue ASA 81mg daily  - h/o UGI bleed: Protonix 40mg daily   - Carb restricted diet as tolerated  - DVT PPx: SCDs at all times  - Sacral decub: seen by  wound care, recs appreciated   - Pain control PRN  -start calorie count  -will sent cholesterol panel w/ AM labs    [] VRE bacteremia (7/10)  - Daptomycin completed on 7/26   - Bld cx from 7/20 with NG, urine cx (7/20) with NG   - Appreciate ID recommendations    [] Hyperglycemia  - Appreciate endocrinology recommendations  - low blood glucose levels due to AMS and limited oral intake   - continue to monitor BG with Humalog insulin SSI coverage for now (was previously on Humalog 5U premeal and Lantus 8 units qhs)  - Fingersticks ac and qhs    [] Heel ulcer  - Continue local wound care w/ santyl and DSD as per podiatry    [] Dispo:   - continue 1:1 with tele

## 2020-07-30 NOTE — PROGRESS NOTE ADULT - ASSESSMENT
63 y/o M PMHx decompensated JEAN Cirrhosis on transplant list, DMII, HFpEF, recent admission for ESBL E coli prostatic abscess 2/2 4 wks ertapenem, hx of ESBL UTIs with prostate abscess s/p IV abx, recent VRE urinary colonization came to hospital for worsening jaundice and episode of confusion, resolved PTA. s/p treatment course for possible VRE UTI. Now s/p OLT on 7/2/20. Course complicated by Encephalopathy and Tremors. Blood Cultures (7/10) with VRE sensitive to Linezolid. CT C/A/P without obvious intraabdominal source. Hypoxia prior to positive BCx which may be related to sepsis CT Chest (7/11) without evidence of pneumonia. Had L groin fluid collection which appears to be consistent with seroma - sample obtained for culture also negative. No drainage, erythema or underlying fluctuance was appreciated at the right heel. MRI prior to transplant without obvious infection. Transferred from SICU to [facility] on 7/17/2020.    Currently on:  nystatin    Suspension [id number] four times a day  trimethoprim   80 mG/sulfamethoxazole 400 mG 1 daily  valGANciclovir 900 daily    #VRE Bacteremia, L Groin Collection, Encephalopathy  - s/p Linezolid and Daptomycin ended on 7/26 for VRE bacteremia post-OLT  - Abd US showed 9cm Left groin collection: likely represents a seroma or lymphocele; aspirated - cx: PMN seen, no organism seen  - Continue monitoring cylosporine levels  - TTE negative for endocarditis  - UA/UC on 7/20 no UTI  - BCx on 7/20 NGTD  - Doubt if he has true UTI or prostate infection and his UC on 7/20 also no growth  - Switched to Everolimus on 7/27 to avoid CNI CNS Toxicity - would monitor mental status   - Would recommend CNS imaging given persistent encephalopathy and neurology consult   - If encephalopathy does not improve and CNS imaging unrevealing would pursue LP    #Hypoxic Respiratory Failure, Encephalopathy  - No evidence of pneumonia and collection in left groin appears consistent with seroma  - now off the Meropenem    #s/p OLT   - Continue bactrim, valcyte (CMV D+/R-) for prophylaxis  - Monitor patient for tremors - was on tacrolimus(CI), then cylosporine(CI), now on Everolimus(mTor I)    #Right heel wound  --Podiatry on board    --Local wound care     Suyapa Mayer MD, PGY4   ID fellow  Pager: [phone]  After 5pm/weekends call [phone]    #PENDING RECS. PLEASE WAIT FOR FINAL RECS AFTER DISCUSSION WITH ATTENDING# 65 y/o M PMHx decompensated EJAN Cirrhosis on transplant list, DMII, HFpEF, recent admission for ESBL E coli prostatic abscess 2/2 4 wks ertapenem, hx of ESBL UTIs with prostate abscess s/p IV abx, recent VRE urinary colonization came to hospital for worsening jaundice and episode of confusion, resolved PTA. s/p treatment course for possible VRE UTI. Now s/p OLT on 7/2/20. Course complicated by Encephalopathy and Tremors. Blood Cultures (7/10) with VRE sensitive to Linezolid. CT C/A/P without obvious intraabdominal source. Hypoxia prior to positive BCx which may be related to sepsis CT Chest (7/11) without evidence of pneumonia. Had L groin fluid collection which appears to be consistent with seroma - sample obtained for culture also negative. No drainage, erythema or underlying fluctuance was appreciated at the right heel. MRI prior to transplant without obvious infection. Transferred from SICU to [facility] on 7/17/2020.    Currently on:  nystatin    Suspension [id number] four times a day  trimethoprim   80 mG/sulfamethoxazole 400 mG 1 daily  valGANciclovir 900 daily    #VRE Bacteremia, L Groin Collection, Encephalopathy  - s/p Linezolid and Daptomycin ended on 7/26 for VRE bacteremia post-OLT  - Abd US showed 9cm Left groin collection: likely represents a seroma or lymphocele; aspirated - cx: PMN seen, no organism seen  - Continue monitoring cylosporine levels  - TTE negative for endocarditis  - UA/UC on 7/20 no UTI  - BCx on 7/20 NGTD  - Doubt if he has true UTI or prostate infection and his UC on 7/20 also no growth  - Switched to Everolimus on 7/27 to avoid CNI CNS Toxicity - would monitor mental status   - Would recommend CNS imaging given persistent encephalopathy and neurology consult   - If encephalopathy does not improve and CNS imaging unrevealing would pursue LP  - Check West Nile IgM and IgG as this can acquired from transplant    #Hypoxic Respiratory Failure, Encephalopathy  - No evidence of pneumonia and collection in left groin appears consistent with seroma  - now off the Meropenem    #s/p OLT   - Continue bactrim, valcyte (CMV D+/R-) for prophylaxis  - Monitor patient for tremors - was on tacrolimus(CI), then cylosporine(CI), now on Everolimus(mTor I)    #Right heel wound  --Podiatry on board    --Local wound care     Suyapa Mayer MD, PGY4   ID fellow  Pager: [phone]  After 5pm/weekends call [phone]    d/w Dr. Pérez 65 y/o M PMHx decompensated JEAN Cirrhosis on transplant list, DMII, HFpEF, recent admission for ESBL E coli prostatic abscess 2/2 4 wks ertapenem, hx of ESBL UTIs with prostate abscess s/p IV abx, recent VRE urinary colonization came to hospital for worsening jaundice and episode of confusion, resolved PTA. s/p treatment course for possible VRE UTI. Now s/p OLT on 7/2/20. Course complicated by Encephalopathy and Tremors. Blood Cultures (7/10) with VRE sensitive to Linezolid. CT C/A/P without obvious intraabdominal source. Hypoxia prior to positive BCx which may be related to sepsis CT Chest (7/11) without evidence of pneumonia. Had L groin fluid collection which appears to be consistent with seroma - sample obtained for culture also negative. No drainage, erythema or underlying fluctuance was appreciated at the right heel. MRI prior to transplant without obvious infection. Transferred from SICU to [facility] on 7/17/2020.    Currently on:  nystatin    Suspension [id number] four times a day  trimethoprim   80 mG/sulfamethoxazole 400 mG 1 daily  valGANciclovir 900 daily    #VRE Bacteremia, L Groin Collection, Encephalopathy  - s/p Linezolid and Daptomycin ended on 7/26 for VRE bacteremia post-OLT  - Abd US showed 9cm Left groin collection: likely represents a seroma or lymphocele; aspirated - cx: PMN seen, no organism seen  - Continue monitoring cylosporine levels  - TTE negative for endocarditis  - UA/UC on 7/20 no UTI  - BCx on 7/20 NGTD  - Doubt if he has true UTI or prostate infection and his UC on 7/20 also no growth  - Switched to Everolimus on 7/27 to avoid CNI CNS Toxicity - would monitor mental status   - Would recommend CNS imaging given persistent encephalopathy and neurology consult   - If encephalopathy does not improve and CNS imaging unrevealing would pursue LP  - Check West Nile IgM and IgG (low suspicion) as this can acquired from transplant    #Hypoxic Respiratory Failure, Encephalopathy  - No evidence of pneumonia and collection in left groin appears consistent with seroma  - now off the Meropenem    #s/p OLT   - Continue bactrim, valcyte (CMV D+/R-) for prophylaxis  - Monitor patient for tremors - was on tacrolimus(CI), then cylosporine(CI), now on Everolimus(mTor I)    #Right heel wound  --Podiatry on board    --Local wound care     Suyapa Mayer MD, PGY4   ID fellow  Pager: [phone]  After 5pm/weekends call [phone]    d/w Dr. Pérez

## 2020-07-30 NOTE — PROVIDER CONTACT NOTE (OTHER) - NAME OF MD/NP/PA/DO NOTIFIED:
Elba Saravia NP
NP Xiao/OLY Alvarenga
OLY Cardoso
ZBIGNIEW Hagen and OLY Richardson
PA Aguirre/transplant team

## 2020-07-30 NOTE — PROGRESS NOTE ADULT - ATTENDING COMMENTS
remains delirious, but able to feed self, and answer simple questions  continue monitoring cyclosporin levels  decrease trazadone to 25mg nocte

## 2020-07-30 NOTE — PROVIDER CONTACT NOTE (OTHER) - ASSESSMENT
VS as charted, pt A/Ox1-2, pt w/ tremors however not new for pt - hard to get accurate telemetry rhythm for pt

## 2020-07-30 NOTE — PROGRESS NOTE ADULT - PROBLEM SELECTOR PROBLEM 4
[facility] Medicine Services  INPATIENT PROGRESS NOTE     LOS: 9 days   Patient Care Team:  Nish Morales DO as PCP - General (Gastroenterology)    Chief Complaint:  C. difficile colitis      Subjective     Interval History:     Patient Complaints: Patient seen and examined.  Patient states she is feeling well.    History taken from: Patient.    Review of Systems:    Review of Systems   Constitutional: Positive for appetite change. Negative for activity change, chills, diaphoresis, fatigue and fever.   HENT: Negative for congestion, ear pain, rhinorrhea, sore throat and trouble swallowing.    Eyes: Negative for pain and visual disturbance.   Respiratory: Negative for cough, chest tightness, shortness of breath and wheezing.    Cardiovascular: Negative for chest pain, palpitations and leg swelling.   Gastrointestinal: Negative for abdominal pain, blood in stool, constipation, diarrhea, nausea and vomiting.   Endocrine: Negative for cold intolerance and heat intolerance.   Genitourinary: Negative for decreased urine volume, difficulty urinating and dysuria.   Musculoskeletal: Negative for arthralgias, back pain, gait problem and neck pain.   Skin: Negative for color change and rash.   Neurological: Positive for weakness. Negative for dizziness, syncope, light-headedness, numbness and headaches.   Hematological: Negative for adenopathy. Does not bruise/bleed easily.   Psychiatric/Behavioral: Negative for agitation, confusion and sleep disturbance. The patient is not nervous/anxious.          Objective     Vital Signs  Temp:  [97.6 °F (36.4 °C)-98.6 °F (37 °C)] 98.6 °F (37 °C)  Heart Rate:  [] 100  Resp:  [18-20] 20  BP: (137-161)/(69-79) 161/76    Physical Exam:   Physical Exam   Constitutional: She is oriented to person, place, and time. She appears well-developed and well-nourished. No distress.   HENT:   Head: Normocephalic and atraumatic.   Right Ear: External  ear normal.   Left Ear: External ear normal.   Nose: Nose normal.   Eyes: Conjunctivae and EOM are normal. Pupils are equal, round, and reactive to light. Right eye exhibits no discharge. Left eye exhibits no discharge. No scleral icterus.   Neck: Normal range of motion. Neck supple.   Cardiovascular: Normal rate, regular rhythm, normal heart sounds and intact distal pulses.  Exam reveals no gallop and no friction rub.    No murmur heard.  Pulmonary/Chest: Effort normal and breath sounds normal. No respiratory distress. She has no wheezes. She has no rales. She exhibits no tenderness.   Abdominal: Soft. Bowel sounds are normal. She exhibits no distension and no mass. There is no tenderness. There is no rebound and no guarding.   Musculoskeletal: Normal range of motion.   Neurological: She is alert and oriented to person, place, and time.   Skin: Skin is warm and dry. No rash noted. She is not diaphoretic. No erythema. No pallor.   Psychiatric: She has a normal mood and affect. Her behavior is normal.   Nursing note and vitals reviewed.         Results Review:       Results from last 7 days  Lab Units 09/15/17  0526 09/14/17  0532 09/13/17  0515 09/12/17  0543 09/11/17  1949 09/11/17  0553 09/10/17  0600 09/09/17  0616   SODIUM mmol/L 141 138 138 137  --  137 138 140   POTASSIUM mmol/L 3.4* 3.9 4.0 4.5 4.2 3.1* 3.1* 3.8   CHLORIDE mmol/L 108 106 105 105  --  104 101 109   CO2 mmol/L 25.0 21.0* 23.0 23.0  --  27.0 26.0 23.0   BUN mg/dL 15 8 7 7  --  5* 3* 5*   CREATININE mg/dL 0.72 0.71 0.79 0.69  --  0.68 0.76 0.86   GLUCOSE mg/dL 183* 157* 86 88  --  97 96 84   CALCIUM mg/dL 8.5 8.4 8.1* 8.3*  --  7.9* 8.3* 8.1*   BILIRUBIN mg/dL 0.2 0.5 0.4  --   --   --   --   --    ALK PHOS U/L 93 102 84  --   --   --   --   --    ALT (SGPT) U/L 32 32 36  --   --   --   --   --    AST (SGOT) U/L 21 32 29  --   --   --   --   --          Results from last 7 days  Lab Units 09/15/17  0526 09/14/17  0532 09/13/17  0515  09/10/17  0600   MAGNESIUM mg/dL 1.9 1.9 1.7 1.9         Results from last 7 days  Lab Units 09/15/17  0642 09/14/17  0532 09/13/17  0515 09/12/17  0543 09/11/17  0553 09/10/17  0600 09/09/17  0616   WBC 10*3/mm3 13.29* 6.58 6.76 6.48 5.69 7.66 5.83   HEMOGLOBIN g/dL 9.5* 11.5* 10.6* 10.8* 10.2* 11.6* 9.9*   HEMATOCRIT % 29.3* 35.4 32.3* 33.0* 30.6* 35.2 29.7*   PLATELETS 10*3/mm3 219 261 181 187 157 226 173       No results found for: CKTOTAL, CKMB, CKMBINDEX, TROPONINI, TROPONINT    CO2   Date Value Ref Range Status   09/15/2017 25.0 22.0 - 31.0 mmol/L Final              Imaging Results (last 7 days)     ** No results found for the last 168 hours. **                           Urine Culture   Date Value Ref Range Status   09/10/2017 No growth at 24 hours  Final           Medication Review:   Current Facility-Administered Medications   Medication Dose Route Frequency Provider Last Rate Last Dose   • acetaminophen (TYLENOL) tablet 650 mg  650 mg Oral Q4H PRN Atif Nichols MD   650 mg at 09/15/17 0022   • acidophilus (FLORANEX) tablet 1 tablet  1 tablet Oral TID Nish Morales DO   1 tablet at 09/15/17 0800   • famotidine (PEPCID) tablet 40 mg  40 mg Oral Daily Atif Nichols MD   40 mg at 09/15/17 0800   • hydrALAZINE (APRESOLINE) injection 10 mg  10 mg Intravenous Q6H PRN Hattie Barrera MD       • HYDROcodone-acetaminophen (NORCO) 5-325 MG per tablet 1 tablet  1 tablet Oral Q6H PRN Chaz Beckett MD   1 tablet at 09/15/17 1259   • HYDROmorphone (DILAUDID) injection 0.5 mg  0.5 mg Intravenous Q2H PRN Atif Nichols MD        And   • naloxone (NARCAN) injection 0.4 mg  0.4 mg Intravenous Q5 Min PRN Atif Ncihols MD       • influenza vac split quad (FLUZONE QUADRIVALENT) IM suspension 0.5 mL  0.5 mL Intramuscular During Hospitalization Atif Nichols MD       • megestrol (MEGACE) tablet 20 mg  20 mg Oral Daily Chaz Beckett MD   20 mg at 09/15/17 0800   • mesalamine (DELZICOL) delayed  release capsule 400 mg  400 mg Oral TID Nish Morales, DO   400 mg at 09/15/17 0800   • methylPREDNISolone sodium succinate (SOLU-Medrol) injection 40 mg  40 mg Intravenous Q6H Nish Morales, DO   40 mg at 09/15/17 1245   • metroNIDAZOLE (FLAGYL) IVPB 500 mg  500 mg Intravenous Q8H Nish Morales, DO   500 mg at 09/15/17 1246   • ondansetron (ZOFRAN) tablet 4 mg  4 mg Oral Q6H PRN Atif Nichols MD       • pneumococcal polysaccharide 23-valent (PNEUMOVAX-23) vaccine 0.5 mL  0.5 mL Intramuscular During Hospitalization Atif Nichols MD       • potassium chloride (MICRO-K) CR capsule 40 mEq  40 mEq Oral PRN Atif Nichols MD   40 mEq at 09/11/17 1629    Or   • potassium chloride (KLOR-CON) packet 40 mEq  40 mEq Oral PRN Atif Nichols MD        Or   • potassium chloride 10 mEq in 100 mL IVPB  10 mEq Intravenous Q1H PRN Atif Nichols MD   Stopped at 09/07/17 0454   • sodium chloride 0.9 % flush 1-10 mL  1-10 mL Intravenous PRN Atif Nichols MD       • sodium chloride 0.9 % infusion  50 mL/hr Intravenous Continuous Chaz Beckett MD 50 mL/hr at 09/14/17 2057 50 mL/hr at 09/14/17 2057   • vancomycin (VANCOCIN) 50 mg/ml oral solution 250 mg  250 mg Oral Q6H Nish Morales, DO   250 mg at 09/15/17 1246         Assessment/Plan     Principal Problem:    C. difficile colitis  Active Problems:    Lower abdominal pain    Inflammatory bowel disease    Hypokalemia          -Continue with IV antibiotics.  -Continue with steroids.  -GI follow-up appreciated.  -Continue PT OT.  -DVT and GI prophylaxis in place.  -Continue monitoring patient hospital setting and treat patient as course dictates.          This document has been electronically signed by Hattie Barrera MD on September 15, 2017 3:37 PM        EMR Dragon/Transcription disclaimer:   Dictated utilizing Dragon dictation.          ACP (advance care planning)

## 2020-07-30 NOTE — PROVIDER CONTACT NOTE (OTHER) - REASON
Potassium =5.4
pt had 7 beats wide complex - around rhythm was artifact
pt unable to tolerate PO Valcyte d/t pill size - change to oral solution if possible
pt. ANDREINA drain almost out
no drainage from T Tube, increased edema/swelling on right side of abdomen

## 2020-07-30 NOTE — PROVIDER CONTACT NOTE (OTHER) - RECOMMENDATIONS
Transplant team now at bedside to assess patient.
assess ANDREINA contact transplant team regarding ANDREINA
change to oral solution
per provider recommendations

## 2020-07-30 NOTE — PROGRESS NOTE ADULT - SUBJECTIVE AND OBJECTIVE BOX
Chief Complaint:  Patient is a 64y old  Male who presents with a chief complaint of Liver failure, hepatic encephalopathy (2020 12:59)    Reason for consult: s/p liver transplant    Interval Events: Improving mental status this AM. Cyclosporine level decreasing, awaiting everolimus level.     Hospital Medications:  aspirin enteric coated 81 milliGRAM(s) Oral daily  chlorhexidine 2% Cloths 1 Application(s) Topical <User Schedule>  collagenase Ointment 1 Application(s) Topical daily  everolimus (ZORTRESS) 2 milliGRAM(s) Oral <User Schedule>  haloperidol    Injectable 0.5 milliGRAM(s) IV Push every 12 hours PRN  insulin lispro (HumaLOG) corrective regimen sliding scale   SubCutaneous Before meals and at bedtime  magnesium oxide 800 milliGRAM(s) Oral two times a day with meals  mycophenolate mofetil 750 milliGRAM(s) Oral <User Schedule>  nystatin    Suspension 937839 Unit(s) Oral four times a day  nystatin Cream 1 Application(s) Topical two times a day  pantoprazole    Tablet 40 milliGRAM(s) Oral before breakfast  predniSONE   Tablet 5 milliGRAM(s) Oral daily  sodium chloride 0.9%. 1000 milliLiter(s) IV Continuous <Continuous>  tamsulosin 0.8 milliGRAM(s) Oral at bedtime  traZODone 50 milliGRAM(s) Oral at bedtime  trimethoprim   80 mG/sulfamethoxazole 400 mG 1 Tablet(s) Oral daily  valGANciclovir 900 milliGRAM(s) Oral daily      ROS:   General:  No  fevers, chills, night sweats, fatigue  Eyes:  Good vision, no reported pain  ENT:  No sore throat, pain, runny nose  CV:  No pain, palpitations  Pulm:  No dyspnea, cough  GI:  See HPI, otherwise negative  :  No  incontinence, nocturia  Muscle:  No pain, weakness  Neuro:  No memory problems  Psych:  No insomnia, mood problems, depression  Endocrine:  No polyuria, polydipsia, cold/heat intolerance  Heme:  No petechiae, ecchymosis, easy bruisability  Skin:  No rash    PHYSICAL EXAM:   Vital Signs:  Vital Signs Last 24 Hrs  T(C): 36.3 (2020 05:00), Max: 37.6 (2020 17:48)  T(F): 97.3 (2020 05:00), Max: 99.6 (2020 17:48)  HR: 92 (2020 06:13) (82 - 99)  BP: 140/73 (2020 05:00) (120/65 - 164/69)  BP(mean): 86 (2020 09:27) (86 - 86)  RR: 18 (2020 05:00) (18 - 18)  SpO2: 100% (2020 06:13) (98% - 100%)  Daily     Daily Weight in k.2 (2020 05:00)    GENERAL: no acute distress  NEURO: awake, responds to most questions, oriented x 3, + tremors but improved  HEENT: anicteric sclera, no conjunctival pallor appreciated  CHEST: no respiratory distress, no accessory muscle use  CARDIAC: regular rate, rhythm  ABDOMEN: soft, non-tender, non-distended, no rebound or guarding  BUTTOCK: stage I decubitus ulcer  EXTREMITIES: warm, well perfused, no edema, heel lesion c/d/i w/ dressing  SKIN: no lesions noted    LABS: reviewed                        8.8    4.38  )-----------( 128      ( 2020 06:26 )             26.2     07-30    142  |  107  |  36<H>  ----------------------------<  137<H>  4.9   |  23  |  1.62<H>    Ca    9.4      2020 06:26  Phos  3.5     07-30  Mg     2.0     07-30    TPro  6.0  /  Alb  3.7  /  TBili  0.7  /  DBili  0.3<H>  /  AST  13  /  ALT  16  /  AlkPhos  110  07-30    LIVER FUNCTIONS - ( 2020 06:26 )  Alb: 3.7 g/dL / Pro: 6.0 g/dL / ALK PHOS: 110 U/L / ALT: 16 U/L / AST: 13 U/L / GGT: x             Interval Diagnostic Studies: see sunrise for full report

## 2020-07-30 NOTE — PROGRESS NOTE ADULT - ATTENDING COMMENTS
Hepatology Staff: Yomi Medina MD    I saw and examined the patient along with  Dr. Dominguez 07-30-20 @ 12:53.    Patient Medical Record, hosptial course was reviewed and summarized as below:    Vitals: Vital Signs Last 24 Hrs  T(C): 36.6 (30 Jul 2020 09:00), Max: 37.6 (29 Jul 2020 17:48)  T(F): 97.9 (30 Jul 2020 09:00), Max: 99.6 (29 Jul 2020 17:48)  HR: 89 (30 Jul 2020 09:48) (82 - 99)  BP: 127/63 (30 Jul 2020 09:00) (123/60 - 164/69)  RR: 18 (30 Jul 2020 09:48) (18 - 18)  SpO2: 98% (30 Jul 2020 09:48) (97% - 100%)    Antibiotics:valGANciclovir 50 mG/mL Oral Solution 900 milliGRAM(s) Oral daily      Labs:Creatinine, Serum: 1.62 mg/dL (07-30-20 @ 06:26)  Bilirubin Total, Serum: 0.7 mg/dL (07-30-20 @ 06:26)  INR: 1.04 ratio (07-30-20 @ 06:26)    I/O: I&O's Summary    29 Jul 2020 07:01  -  30 Jul 2020 07:00  --------------------------------------------------------  IN: 2305 mL / OUT: 350 mL / NET: 1955 mL    30 Jul 2020 07:01  -  30 Jul 2020 12:53  --------------------------------------------------------  IN: 770 mL / OUT: 0 mL / NET: 770 mL      Nutritional Status:   Albumin, Serum: 3.7 g/dL (07-30-20 @ 06:26)    Last 24 hour events: Mental status better after switching to Everolimus    Recommendations: Cont with current immunosuppression. No prograf for now. We may consider adding a low dose of Prograf down the road once his mental status remain stable for a while. Agree with the rest.      Plan discussed with Primary team.

## 2020-07-30 NOTE — PROGRESS NOTE ADULT - SUBJECTIVE AND OBJECTIVE BOX
INTERVAL HPI/OVERNIGHT EVENTS:    patient seen and examined   sitting in the chair  doing better       MEDICATIONS  (STANDING):  aspirin enteric coated 81 milliGRAM(s) Oral daily  chlorhexidine 2% Cloths 1 Application(s) Topical <User Schedule>  collagenase Ointment 1 Application(s) Topical daily  furosemide    Tablet 80 milliGRAM(s) Oral every 8 hours  insulin glargine Injectable (LANTUS) 16 Unit(s) SubCutaneous at bedtime  insulin lispro (HumaLOG) corrective regimen sliding scale   SubCutaneous three times a day before meals  insulin lispro (HumaLOG) corrective regimen sliding scale   SubCutaneous at bedtime  insulin lispro Injectable (HumaLOG) 10 Unit(s) SubCutaneous before dinner  insulin lispro Injectable (HumaLOG) 8 Unit(s) SubCutaneous before breakfast  insulin lispro Injectable (HumaLOG) 8 Unit(s) SubCutaneous before lunch  linezolid  IVPB 600 milliGRAM(s) IV Intermittent every 12 hours  magnesium oxide 800 milliGRAM(s) Oral two times a day with meals  meropenem  IVPB 1000 milliGRAM(s) IV Intermittent every 8 hours  nystatin    Suspension 397695 Unit(s) Swish and Swallow four times a day  nystatin Cream 1 Application(s) Topical two times a day  pantoprazole  Injectable 40 milliGRAM(s) IV Push every 12 hours  polyethylene glycol 3350 17 Gram(s) Oral daily  predniSONE   Tablet 5 milliGRAM(s) Oral daily  senna 2 Tablet(s) Oral at bedtime  tacrolimus 3 milliGRAM(s) Oral <User Schedule>  tacrolimus 2 milliGRAM(s) Oral <User Schedule>  tamsulosin 0.8 milliGRAM(s) Oral at bedtime  trimethoprim   80 mG/sulfamethoxazole 400 mG 1 Tablet(s) Oral daily  valGANciclovir 450 milliGRAM(s) Oral daily    MEDICATIONS  (PRN):  glucagon  Injectable 1 milliGRAM(s) IntraMuscular once PRN Glucose <70 milliGRAM(s)/deciLiter      Allergies    codeine (Anaphylaxis)    Intolerances        Review of Systems: unable to obtain           Vital Signs Last 24 Hrs  T(C): 36.3 (12 Jul 2020 15:00), Max: 36.8 (11 Jul 2020 19:00)  T(F): 97.3 (12 Jul 2020 15:00), Max: 98.2 (11 Jul 2020 19:00)  HR: 80 (12 Jul 2020 15:00) (72 - 83)  BP: 109/59 (12 Jul 2020 15:00) (94/55 - 154/68)  BP(mean): 78 (12 Jul 2020 15:00) (70 - 125)  RR: 15 (12 Jul 2020 15:00) (9 - 28)  SpO2: 96% (12 Jul 2020 15:00) (96% - 100%)    PHYSICAL EXAM:    Constitutional: NAD   HEENT: sclera anicteric   Neck: No LAD, supple  Gastrointestinal: BS+, soft, NT, ND, incisions c/d/i  Extremities: +b/l peripheral edema  Neurological: confused, tremulous     LABS:                        9.3    5.51  )-----------( 98       ( 12 Jul 2020 01:54 )             28.6     07-12    131<L>  |  95<L>  |  31<H>  ----------------------------<  217<H>  5.0   |  27  |  1.26    Ca    8.1<L>      12 Jul 2020 12:22  Phos  4.0     07-12  Mg     2.0     07-12    TPro  4.4<L>  /  Alb  2.6<L>  /  TBili  1.1  /  DBili  0.5<H>  /  AST  64<H>  /  ALT  98<H>  /  AlkPhos  212<H>  07-12    PT/INR - ( 12 Jul 2020 01:54 )   PT: 12.7 sec;   INR: 1.12 ratio         PTT - ( 12 Jul 2020 01:54 )  PTT:28.7 sec      RADIOLOGY & ADDITIONAL TESTS:

## 2020-07-30 NOTE — PATIENT PROFILE ADULT. - PT NEEDS ASSIST
Acquired hypothyroidism Acquired hypothyroidism Acquired hypothyroidism Hypoxia Acquired hypothyroidism Hypoxia Hypoxia Hypoxia yes

## 2020-07-30 NOTE — PROVIDER CONTACT NOTE (OTHER) - SITUATION
As per day RN, no drainage from t-tube, and increased swelling on right side of abdomen near drain. SICU team aware, and transplant team aware.
Pt has MISA acute liver failure-
pt had 7 beats wide complex - around rhythm was artifact. RN at bedside during this time - pt w/ tremors however alert and talking (not following commands however this is not new for pt)
pt unable to tolerate PO Valcyte d/t pill size - change to oral solution if possible
pt. with ANDREINA drain #1 almost all falling out.

## 2020-07-30 NOTE — PROGRESS NOTE ADULT - ATTENDING COMMENTS
64M PMH JEAN cirrhosis s/p OLT on 7/2/20  Post-Op Course Complicated by Encephalopathy and Tremors  Blood Cultures (7/10) with VRE  CT C/A/P (7/11) without obvious intraabdominal source.  Hypoxia prior to positive BCx which may be related to sepsis   Had L groin fluid collection which appears to be consistent with seroma - sample obtained for culture  TTE limited but without obvious vegetations  Completed 2 week course of antibiotics from negative cultures for VRE Bacteremia (End Date: 7/26)    Continued encephalopathy and tremor  U/A from 7/20 without pyuria  COVID19 PCR 7/24 negative  Blood Cultures 7/20 NGTD    Encephalitis or infectious etiology seems less likely  Switched from Cyclosporine to Everolimus on 7/27 to avoid CNI CNS Toxicity - would monitor mental status   ?Improvement with switch to Everolimus  Would recommend CNS imaging if encephalopathy does not improve (would likely require sedation)   If encephalopathy does not improve and CNS imaging unrevealing would pursue LP  Can check WNV serologies - low suspicion but reasonable to assess    I will be away tomorrow. Please contact the Infectious Diseases Office to contact the covering Infectious Diseases Attending.     Eusebio Pérez M.D.  [facility] Division of Infectious Disease  8AM-5PM: Pager Number [phone]  After Hours (or if no response): Please contact the Infectious Diseases Office at [phone]     The above assessment and plan were discussed with Xiao Transplant NP

## 2020-07-31 DIAGNOSIS — G93.40 ENCEPHALOPATHY, UNSPECIFIED: ICD-10-CM

## 2020-07-31 LAB
ALBUMIN SERPL ELPH-MCNC: 3.2 G/DL — LOW (ref 3.3–5)
ALBUMIN SERPL ELPH-MCNC: 3.3 G/DL — SIGNIFICANT CHANGE UP (ref 3.3–5)
ALP SERPL-CCNC: 104 U/L — SIGNIFICANT CHANGE UP (ref 40–120)
ALP SERPL-CCNC: 116 U/L — SIGNIFICANT CHANGE UP (ref 40–120)
ALT FLD-CCNC: 26 U/L — SIGNIFICANT CHANGE UP (ref 10–45)
ALT FLD-CCNC: 29 U/L — SIGNIFICANT CHANGE UP (ref 10–45)
AMMONIA BLD-MCNC: 63 UMOL/L — HIGH (ref 11–55)
ANION GAP SERPL CALC-SCNC: 11 MMOL/L — SIGNIFICANT CHANGE UP (ref 5–17)
ANION GAP SERPL CALC-SCNC: 15 MMOL/L — SIGNIFICANT CHANGE UP (ref 5–17)
APTT BLD: 28.2 SEC — SIGNIFICANT CHANGE UP (ref 27.5–35.5)
AST SERPL-CCNC: 25 U/L — SIGNIFICANT CHANGE UP (ref 10–40)
AST SERPL-CCNC: 26 U/L — SIGNIFICANT CHANGE UP (ref 10–40)
BILIRUB DIRECT SERPL-MCNC: 0.2 MG/DL — SIGNIFICANT CHANGE UP (ref 0–0.2)
BILIRUB INDIRECT FLD-MCNC: 0.3 MG/DL — SIGNIFICANT CHANGE UP (ref 0.2–1)
BILIRUB SERPL-MCNC: 0.5 MG/DL — SIGNIFICANT CHANGE UP (ref 0.2–1.2)
BILIRUB SERPL-MCNC: 0.5 MG/DL — SIGNIFICANT CHANGE UP (ref 0.2–1.2)
BUN SERPL-MCNC: 36 MG/DL — HIGH (ref 7–23)
BUN SERPL-MCNC: 36 MG/DL — HIGH (ref 7–23)
CALCIUM SERPL-MCNC: 9 MG/DL — SIGNIFICANT CHANGE UP (ref 8.4–10.5)
CALCIUM SERPL-MCNC: 9 MG/DL — SIGNIFICANT CHANGE UP (ref 8.4–10.5)
CHLORIDE SERPL-SCNC: 106 MMOL/L — SIGNIFICANT CHANGE UP (ref 96–108)
CHLORIDE SERPL-SCNC: 107 MMOL/L — SIGNIFICANT CHANGE UP (ref 96–108)
CO2 SERPL-SCNC: 19 MMOL/L — LOW (ref 22–31)
CO2 SERPL-SCNC: 22 MMOL/L — SIGNIFICANT CHANGE UP (ref 22–31)
CREAT SERPL-MCNC: 1.25 MG/DL — SIGNIFICANT CHANGE UP (ref 0.5–1.3)
CREAT SERPL-MCNC: 1.3 MG/DL — SIGNIFICANT CHANGE UP (ref 0.5–1.3)
CYCLOSPORINE SER-MCNC: 38 NG/ML — LOW (ref 150–400)
CYCLOSPORINE SER-MCNC: 56 NG/ML — LOW (ref 150–400)
EVEROLIMUS, WHOLE BLOOD RESULT: 4.2 NG/ML — SIGNIFICANT CHANGE UP (ref 3–8)
GLUCOSE BLDC GLUCOMTR-MCNC: 148 MG/DL — HIGH (ref 70–99)
GLUCOSE BLDC GLUCOMTR-MCNC: 172 MG/DL — HIGH (ref 70–99)
GLUCOSE BLDC GLUCOMTR-MCNC: 192 MG/DL — HIGH (ref 70–99)
GLUCOSE BLDC GLUCOMTR-MCNC: 231 MG/DL — HIGH (ref 70–99)
GLUCOSE SERPL-MCNC: 141 MG/DL — HIGH (ref 70–99)
GLUCOSE SERPL-MCNC: 234 MG/DL — HIGH (ref 70–99)
HCT VFR BLD CALC: 25 % — LOW (ref 39–50)
HGB BLD-MCNC: 8.4 G/DL — LOW (ref 13–17)
INR BLD: 1.04 RATIO — SIGNIFICANT CHANGE UP (ref 0.88–1.16)
MAGNESIUM SERPL-MCNC: 1.7 MG/DL — SIGNIFICANT CHANGE UP (ref 1.6–2.6)
MAGNESIUM SERPL-MCNC: 2 MG/DL — SIGNIFICANT CHANGE UP (ref 1.6–2.6)
MCHC RBC-ENTMCNC: 32.6 PG — SIGNIFICANT CHANGE UP (ref 27–34)
MCHC RBC-ENTMCNC: 33.6 GM/DL — SIGNIFICANT CHANGE UP (ref 32–36)
MCV RBC AUTO: 96.9 FL — SIGNIFICANT CHANGE UP (ref 80–100)
NRBC # BLD: 0 /100 WBCS — SIGNIFICANT CHANGE UP (ref 0–0)
PHOSPHATE SERPL-MCNC: 3 MG/DL — SIGNIFICANT CHANGE UP (ref 2.5–4.5)
PLATELET # BLD AUTO: 125 K/UL — LOW (ref 150–400)
POTASSIUM SERPL-MCNC: 4.7 MMOL/L — SIGNIFICANT CHANGE UP (ref 3.5–5.3)
POTASSIUM SERPL-MCNC: 4.9 MMOL/L — SIGNIFICANT CHANGE UP (ref 3.5–5.3)
POTASSIUM SERPL-SCNC: 4.7 MMOL/L — SIGNIFICANT CHANGE UP (ref 3.5–5.3)
POTASSIUM SERPL-SCNC: 4.9 MMOL/L — SIGNIFICANT CHANGE UP (ref 3.5–5.3)
PROCALCITONIN SERPL-MCNC: 0.18 NG/ML — HIGH (ref 0.02–0.1)
PROT SERPL-MCNC: 5.4 G/DL — LOW (ref 6–8.3)
PROT SERPL-MCNC: 5.6 G/DL — LOW (ref 6–8.3)
PROTHROM AB SERPL-ACNC: 12.3 SEC — SIGNIFICANT CHANGE UP (ref 10.6–13.6)
RBC # BLD: 2.58 M/UL — LOW (ref 4.2–5.8)
RBC # FLD: 18.1 % — HIGH (ref 10.3–14.5)
SARS-COV-2 RNA SPEC QL NAA+PROBE: SIGNIFICANT CHANGE UP
SODIUM SERPL-SCNC: 139 MMOL/L — SIGNIFICANT CHANGE UP (ref 135–145)
SODIUM SERPL-SCNC: 141 MMOL/L — SIGNIFICANT CHANGE UP (ref 135–145)
WBC # BLD: 3.42 K/UL — LOW (ref 3.8–10.5)
WBC # FLD AUTO: 3.42 K/UL — LOW (ref 3.8–10.5)

## 2020-07-31 PROCEDURE — 99232 SBSQ HOSP IP/OBS MODERATE 35: CPT | Mod: GC

## 2020-07-31 PROCEDURE — 99232 SBSQ HOSP IP/OBS MODERATE 35: CPT | Mod: GC,24

## 2020-07-31 PROCEDURE — 70450 CT HEAD/BRAIN W/O DYE: CPT | Mod: 26

## 2020-07-31 PROCEDURE — 99232 SBSQ HOSP IP/OBS MODERATE 35: CPT

## 2020-07-31 RX ORDER — TRAZODONE HCL 50 MG
50 TABLET ORAL ONCE
Refills: 0 | Status: COMPLETED | OUTPATIENT
Start: 2020-07-31 | End: 2020-07-31

## 2020-07-31 RX ORDER — EVEROLIMUS 10 MG/1
3 TABLET ORAL
Refills: 0 | Status: DISCONTINUED | OUTPATIENT
Start: 2020-07-31 | End: 2020-08-01

## 2020-07-31 RX ORDER — EVEROLIMUS 10 MG/1
0.5 TABLET ORAL ONCE
Refills: 0 | Status: COMPLETED | OUTPATIENT
Start: 2020-07-31 | End: 2020-07-31

## 2020-07-31 RX ORDER — MAGNESIUM SULFATE 500 MG/ML
1 VIAL (ML) INJECTION ONCE
Refills: 0 | Status: COMPLETED | OUTPATIENT
Start: 2020-07-31 | End: 2020-07-31

## 2020-07-31 RX ORDER — INSULIN LISPRO 100/ML
2 VIAL (ML) SUBCUTANEOUS ONCE
Refills: 0 | Status: COMPLETED | OUTPATIENT
Start: 2020-07-31 | End: 2020-07-31

## 2020-07-31 RX ORDER — DIPHENHYDRAMINE HCL 50 MG
50 CAPSULE ORAL AT BEDTIME
Refills: 0 | Status: DISCONTINUED | OUTPATIENT
Start: 2020-08-01 | End: 2020-08-02

## 2020-07-31 RX ADMIN — EVEROLIMUS 0.5 MILLIGRAM(S): 10 TABLET ORAL at 09:45

## 2020-07-31 RX ADMIN — MAGNESIUM OXIDE 400 MG ORAL TABLET 800 MILLIGRAM(S): 241.3 TABLET ORAL at 18:52

## 2020-07-31 RX ADMIN — Medication 500000 UNIT(S): at 05:59

## 2020-07-31 RX ADMIN — Medication 2: at 12:02

## 2020-07-31 RX ADMIN — EVEROLIMUS 2.5 MILLIGRAM(S): 10 TABLET ORAL at 09:51

## 2020-07-31 RX ADMIN — MYCOPHENOLATE MOFETIL 750 MILLIGRAM(S): 250 CAPSULE ORAL at 09:07

## 2020-07-31 RX ADMIN — MAGNESIUM OXIDE 400 MG ORAL TABLET 800 MILLIGRAM(S): 241.3 TABLET ORAL at 09:08

## 2020-07-31 RX ADMIN — NYSTATIN CREAM 1 APPLICATION(S): 100000 CREAM TOPICAL at 18:34

## 2020-07-31 RX ADMIN — MYCOPHENOLATE MOFETIL 750 MILLIGRAM(S): 250 CAPSULE ORAL at 20:07

## 2020-07-31 RX ADMIN — Medication 5 MILLIGRAM(S): at 05:58

## 2020-07-31 RX ADMIN — Medication 500000 UNIT(S): at 21:58

## 2020-07-31 RX ADMIN — Medication 1 TABLET(S): at 12:03

## 2020-07-31 RX ADMIN — Medication 500000 UNIT(S): at 18:33

## 2020-07-31 RX ADMIN — VALGANCICLOVIR 900 MILLIGRAM(S): 450 TABLET, FILM COATED ORAL at 12:27

## 2020-07-31 RX ADMIN — Medication 1: at 21:25

## 2020-07-31 RX ADMIN — TAMSULOSIN HYDROCHLORIDE 0.8 MILLIGRAM(S): 0.4 CAPSULE ORAL at 20:06

## 2020-07-31 RX ADMIN — NYSTATIN CREAM 1 APPLICATION(S): 100000 CREAM TOPICAL at 06:11

## 2020-07-31 RX ADMIN — Medication 50 MILLIGRAM(S): at 23:46

## 2020-07-31 RX ADMIN — EVEROLIMUS 3 MILLIGRAM(S): 10 TABLET ORAL at 20:07

## 2020-07-31 RX ADMIN — PANTOPRAZOLE SODIUM 40 MILLIGRAM(S): 20 TABLET, DELAYED RELEASE ORAL at 05:59

## 2020-07-31 RX ADMIN — Medication 81 MILLIGRAM(S): at 12:02

## 2020-07-31 RX ADMIN — Medication 2 UNIT(S): at 18:00

## 2020-07-31 RX ADMIN — Medication 500000 UNIT(S): at 12:03

## 2020-07-31 RX ADMIN — Medication 100 GRAM(S): at 18:33

## 2020-07-31 RX ADMIN — Medication 1 APPLICATION(S): at 12:03

## 2020-07-31 RX ADMIN — Medication 100 GRAM(S): at 14:30

## 2020-07-31 NOTE — PROGRESS NOTE ADULT - ASSESSMENT
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 c/b VRE bactermia and delerium    [] POD 29 s/p OLT   - Good graft function, LFTs stable   - Immuno: Tacro DCd 7/19. Cyclosporine DCd 7/27.  Changed MTOR  inhibitor on 7/27 due to persistent delerium/AMS. Everolimus 2.5mg BID, MMF 750mg BID,  Pred 5mg daily  -Although mental status is improving patient remains altered. Will continue with Trazodone 25mg qhs. Haldol d/c'd. Will obtained head MRI today to evaluate persistent altered mental status.  - Daily Everolimus and Cyclosporine levels  - PPX: valcyte solution/nystatin/bactrim  -Continue IVF at 75cc/hr for dehydration  - Continue ASA 81mg daily  - h/o UGI bleed: Protonix 40mg daily   - Carb restricted diet as tolerated  - DVT PPx: SCDs at all times  - Sacral decub: seen by  wound care, recs appreciated, appears to be healing well  - Pain control PRN  -start calorie count  -will sent cholesterol panel w/ AM labs    [] VRE bacteremia (7/10)  - Daptomycin completed on 7/26   - Bld cx from 7/20 with NG, urine cx (7/20) with NG   - Appreciate ID recommendations    [] Hyperglycemia  - Appreciate endocrinology recommendations  - continue to monitor BG with Humalog insulin SSI coverage for now (was previously on Humalog 5U premeal and Lantus 8 units qhs)  - Fingersticks ac and qhs    [] Heel ulcer  - Continue local wound care w/ santyl and DSD as per podiatry    [] Dispo:   - continue 1:1 with tele

## 2020-07-31 NOTE — CHART NOTE - NSCHARTNOTEFT_GEN_A_CORE
Nutrition Follow Up Note     Patient seen for: nutrition follow up s/p Liver Txp 7/2 and Calorie Count ordered 7/30.    Source: PCA, RN, medical record, communication with team. Pt with persistent delirium; ordered for constant observation.    Chart reviewed, events noted. s/p liver transplant 7/2    Diet Order: Diet, Consistent Carbohydrate w/Evening Snack:   Low Fat (LOWFAT)  No Concentrated Potassium (07-27-20 @ 09:52)    Nutrition Events:   - PO Intake: Pt has been consistently eating 100% of meals s/p transplant.   - Calorie Count ordered 7/30 in setting of "low blood glucose levels due to AMS and limited oral intake"   --> Day 1 (7/30): pt appears to have consumed a significant portion of every meal  --> Day 2 (7/31): pt consumed 100% of breakfast tray, per PCA  - Hyperglycemia improved; currently managed with Humalog corrective regimen (premeal/bedtime)  - Hypoglycemia: last episode 7/25. BG usually within Endocrine goal of 100-180mg/dl.  - Hyperkalemia: potassium now trending down, currently 4.7; No Concentrated Potassium dietary restriction continues.  - Nutrition Education: Unable to reinforce diet education. Previously reviewed post transplant nutrition therapy and food safety guidelines for transplant recipients. RD will continue to reinforce diet education prior to discharge, when pt is alert and receptive.     Last BM 7/30 x5. Bowel regimen: none    Anthropometric Measurements:   Height (cm): 185 (07-01-20 @ 13:54), 185.42 (04-27-20 @ 14:53)  Weight (kg): 103 (07-01-20 @ 13:54), 117.9 (04-27-20 @ 14:53); 118.2 (07-10-20). 106.2 (07-16-20); 106.4 (07-21-20), 102.1 (07-27-20), 100.2kg (07-30-20); weight changes likely reflect fluid shifts  BMI (kg/m2): 30.1 (07-01-20 @ 13:54), 34.3 (04-27-20 @ 14:53)  IBW: 83.4 kg    Medications: MEDICATIONS  (STANDING):  aspirin enteric coated 81 milliGRAM(s) Oral daily  chlorhexidine 2% Cloths 1 Application(s) Topical <User Schedule>  collagenase Ointment 1 Application(s) Topical daily  everolimus (ZORTRESS) 3 milliGRAM(s) Oral <User Schedule>  insulin lispro (HumaLOG) corrective regimen sliding scale   SubCutaneous Before meals and at bedtime  magnesium oxide 800 milliGRAM(s) Oral two times a day with meals  mycophenolate mofetil 750 milliGRAM(s) Oral <User Schedule>  nystatin    Suspension 448109 Unit(s) Oral four times a day  nystatin Cream 1 Application(s) Topical two times a day  pantoprazole    Tablet 40 milliGRAM(s) Oral before breakfast  predniSONE   Tablet 5 milliGRAM(s) Oral daily  sodium chloride 0.9%. 1000 milliLiter(s) (75 mL/Hr) IV Continuous <Continuous>  tamsulosin 0.8 milliGRAM(s) Oral at bedtime  traZODone 25 milliGRAM(s) Oral at bedtime  trimethoprim   80 mG/sulfamethoxazole 400 mG 1 Tablet(s) Oral daily  valGANciclovir 50 mG/mL Oral Solution 900 milliGRAM(s) Oral daily    Labs: 07-31 @ 06:50:  Hemoglobin 8.4<L>, Hematocrit 25.0<L>, Creatine Kinase <<27>  07-31 @ 06:49: Sodium 141, Potassium 4.7, Calcium 9.0, Magnesium 1.7, Phosphorus 3.0, BUN 36<H>, Creatinine 1.25, Glucose 141<H>, Alk Phos 104, ALT/SGPT 29, AST/SGOT 26, Albumin 3.2<L>, Total Bilirubin 0.5, Creatine Kinase <<27>    Triglycerides, Serum: 117 mg/dL (07-27-20 @ 15:59)  Triglycerides, Serum: 122 mg/dL (07-27-20 @ 13:03)    HbA1c 5.2%    POCT Blood Glucose.: 231 mg/dL (07-31-20 @ 11:52)  POCT Blood Glucose.: 148 mg/dL (07-31-20 @ 08:45)  POCT Blood Glucose.: 165 mg/dL (07-30-20 @ 21:38)  POCT Blood Glucose.: 168 mg/dL (07-30-20 @ 17:58)  POCT Blood Glucose.: 178 mg/dL (07-30-20 @ 12:42)    Skin per nursing documentation: no pressure injuries documented  Edema: none noted    Estimated Needs: based on IBW 83.4 kg   Energy: 1892-1256 calories  (25-30 fahad/kg)  Protein: 100-117 gm (1.2-1.4 gm/kg)    Previous Nutrition Diagnosis: 1) Increased Nutrient Needs 2) Food and Nutrition Related Knowledge Deficit  Nutrition Diagnosis is: ongoing, addressed with therapeutic diet and diet education (when appropriate)    New Nutrition Diagnosis:  none    Recommended Interventions:   1) Continue Consistent Carbohydrate, Low Fat diet. Continue Potassium restriction as warranted.   2) Monitor results of Calorie Count in progress.  3) Continue to reinforce post-transplant nutrition therapy and food safety guidelines in-house and prior to discharge, when pt is alert and oriented.   4) Discharge diet: Continue as above. Recommend follow up visit with Transplant MD and outpatient RD for dietary modifications as warranted.     Monitoring and Evaluation:   Continue to monitor nutrition provision and tolerance, weights, labs, skin integrity.   RD remains available upon request and will follow up per protocol.    Myah Argueta, MS RD CDN [facility]; Pager # [phone]. Nutrition Follow Up Note     Patient seen for: nutrition follow up s/p Liver Txp 7/2 and Calorie Count ordered 7/30.    Source: PCA, RN, medical record, communication with team. Pt with persistent delirium; ordered for constant observation.    Chart reviewed, events noted. Pt s/p liver transplant (7/2/20). Post-op course c/b tacrolimus toxicity and hospital delirium.      Diet Order: Diet, Consistent Carbohydrate w/Evening Snack:   Low Fat (LOWFAT)  No Concentrated Potassium (07-27-20 @ 09:52)    Nutrition Events:   - PO Intake: Pt has been consistently eating 100% of meals s/p transplant.   - Calorie Count ordered 7/30 in setting of "low blood glucose levels due to AMS and limited oral intake"   --> Day 1 (7/30): pt appears to have consumed a significant portion of every meal  --> Day 2 (7/31): pt consumed 100% of breakfast tray, per PCA  - Hyperglycemia improved; currently managed with Humalog corrective regimen (premeal/bedtime)  - Hypoglycemia: last episode 7/25. BG usually within Endocrine goal of 100-180mg/dl.  - Hyperkalemia: potassium now trending down, currently 4.7; No Concentrated Potassium dietary restriction continues.  - Nutrition Education: Unable to reinforce diet education. Previously reviewed post transplant nutrition therapy and food safety guidelines for transplant recipients. RD will continue to reinforce diet education prior to discharge, when pt is alert and receptive.     Last BM 7/30 x5. Bowel regimen: none    Anthropometric Measurements:   Height (cm): 185 (07-01-20 @ 13:54), 185.42 (04-27-20 @ 14:53)  Weight (kg): 103 (07-01-20 @ 13:54), 117.9 (04-27-20 @ 14:53); 118.2 (07-10-20). 106.2 (07-16-20); 106.4 (07-21-20), 102.1 (07-27-20), 100.2kg (07-30-20); weight changes likely reflect fluid shifts  BMI (kg/m2): 30.1 (07-01-20 @ 13:54), 34.3 (04-27-20 @ 14:53)  IBW: 83.4 kg    Medications: MEDICATIONS  (STANDING):  aspirin enteric coated 81 milliGRAM(s) Oral daily  chlorhexidine 2% Cloths 1 Application(s) Topical <User Schedule>  collagenase Ointment 1 Application(s) Topical daily  everolimus (ZORTRESS) 3 milliGRAM(s) Oral <User Schedule>  insulin lispro (HumaLOG) corrective regimen sliding scale   SubCutaneous Before meals and at bedtime  magnesium oxide 800 milliGRAM(s) Oral two times a day with meals  mycophenolate mofetil 750 milliGRAM(s) Oral <User Schedule>  nystatin    Suspension 185463 Unit(s) Oral four times a day  nystatin Cream 1 Application(s) Topical two times a day  pantoprazole    Tablet 40 milliGRAM(s) Oral before breakfast  predniSONE   Tablet 5 milliGRAM(s) Oral daily  sodium chloride 0.9%. 1000 milliLiter(s) (75 mL/Hr) IV Continuous <Continuous>  tamsulosin 0.8 milliGRAM(s) Oral at bedtime  traZODone 25 milliGRAM(s) Oral at bedtime  trimethoprim   80 mG/sulfamethoxazole 400 mG 1 Tablet(s) Oral daily  valGANciclovir 50 mG/mL Oral Solution 900 milliGRAM(s) Oral daily    Labs: 07-31 @ 06:50:  Hemoglobin 8.4<L>, Hematocrit 25.0<L>, Creatine Kinase <<27>  07-31 @ 06:49: Sodium 141, Potassium 4.7, Calcium 9.0, Magnesium 1.7, Phosphorus 3.0, BUN 36<H>, Creatinine 1.25, Glucose 141<H>, Alk Phos 104, ALT/SGPT 29, AST/SGOT 26, Albumin 3.2<L>, Total Bilirubin 0.5, Creatine Kinase <<27>    Triglycerides, Serum: 117 mg/dL (07-27-20 @ 15:59)  Triglycerides, Serum: 122 mg/dL (07-27-20 @ 13:03)    HbA1c 5.2%    POCT Blood Glucose.: 231 mg/dL (07-31-20 @ 11:52)  POCT Blood Glucose.: 148 mg/dL (07-31-20 @ 08:45)  POCT Blood Glucose.: 165 mg/dL (07-30-20 @ 21:38)  POCT Blood Glucose.: 168 mg/dL (07-30-20 @ 17:58)  POCT Blood Glucose.: 178 mg/dL (07-30-20 @ 12:42)    Skin per nursing documentation: no pressure injuries documented  Edema: none noted    Estimated Needs: based on IBW 83.4 kg   Energy: 9924-3811 calories  (25-30 fahad/kg)  Protein: 100-117 gm (1.2-1.4 gm/kg)    Previous Nutrition Diagnosis: 1) Increased Nutrient Needs 2) Food and Nutrition Related Knowledge Deficit  Nutrition Diagnosis is: ongoing, addressed with therapeutic diet and diet education (when appropriate)    New Nutrition Diagnosis:  none    Recommended Interventions:   1) Continue Consistent Carbohydrate, Low Fat diet. Continue Potassium restriction as warranted.   2) Recommend discontinue Calorie Count if pt continues to consume 100% of meals.  3) Continue to reinforce post-transplant nutrition therapy and food safety guidelines in-house and prior to discharge, when pt is alert and oriented.   4) Discharge diet: Continue as above. Recommend follow up visit with Transplant MD and outpatient RD for dietary modifications as warranted.     Monitoring and Evaluation:   Continue to monitor nutrition provision and tolerance, weights, labs, skin integrity.   RD remains available upon request and will follow up per protocol.    Myah Argueta, MS RD CDN [facility]; Pager # [phone].

## 2020-07-31 NOTE — PROGRESS NOTE ADULT - ATTENDING COMMENTS
Patient seen and examined with Dr. Mayer    I agree with his interval history exam and plans as noted above    Remains with confusion, waxing and waning mental status, occasional tremors, CT of head with atrophy,  Differential post transplant Tacrolimus changed to Everolimus, vs infection vs hepatic encephalopathy  Agree with West Nile serology  Would consider Neurology consult and CSF sampling- r/o PML CSF YESSICA virus    Saeed Menezes MD  [phone]  After 5pm/weekends [phone]

## 2020-07-31 NOTE — PROGRESS NOTE ADULT - ATTENDING COMMENTS
Hepatology Staff: Yomi Medina MD    I saw and examined the patient along with  Dr. Dominguez  07-31-20 @ 08:50.    Patient Medical Record, hosptial course was reviewed and summarized as below:    Vitals: Vital Signs Last 24 Hrs  T(C): 36.5 (31 Jul 2020 05:52), Max: 36.9 (31 Jul 2020 00:00)  T(F): 97.7 (31 Jul 2020 05:52), Max: 98.4 (31 Jul 2020 00:00)  HR: 83 (31 Jul 2020 05:52) (78 - 90)  BP: 123/66 (31 Jul 2020 05:52) (123/66 - 145/75)    RR: 18 (31 Jul 2020 05:52) (18 - 20)  SpO2: 99% (31 Jul 2020 05:52) (97% - 100%)      Antibiotics:valGANciclovir 50 mG/mL Oral Solution 900 milliGRAM(s) Oral daily      Labs:Creatinine, Serum: 1.25 mg/dL (07-31-20 @ 06:49)  Bilirubin Total, Serum: 0.5 mg/dL (07-31-20 @ 06:49)  INR: 1.04 ratio (07-31-20 @ 06:48)      I/O: I&O's Summary    30 Jul 2020 07:01  -  31 Jul 2020 07:00  --------------------------------------------------------  IN: 1660 mL / OUT: 1235 mL / NET: 425 mL      Nutritional Status:   Albumin, Serum: 3.2 g/dL (07-31-20 @ 06:49)    Last 24 hour events: Overall improved mental status with less tremors. However, still confused.     Recommendations: Plan for a MRI of the brain today. Increase Everolimus to 3 mg PO bid. His level is 5.4 from 7/29.  We will also recommend increasing Trazadone to 50 mg Po qd.    Plan discussed with Primary team.

## 2020-07-31 NOTE — PROGRESS NOTE ADULT - SUBJECTIVE AND OBJECTIVE BOX
Patient is a 64y old  Male who presents with a chief complaint of Liver failure, hepatic encephalopathy (2020 15:21)       INTERVAL HPI/OVERNIGHT EVENTS:  Patient seen and evaluated at bedside.  Pt is resting comfortable in NAD.    Allergies    codeine (Anaphylaxis)    Intolerances        Vital Signs Last 24 Hrs  T(C): 36.7 (2020 13:00), Max: 37.2 (2020 09:00)  T(F): 98 (2020 13:00), Max: 99 (2020 09:00)  HR: 84 (2020 17:06) (80 - 94)  BP: 122/69 (2020 13:00) (122/69 - 145/75)  BP(mean): --  RR: 20 (2020 10:01) (18 - 20)  SpO2: 95% (2020 17:06) (95% - 100%)    LABS:                        8.4    3.42  )-----------( 125      ( 2020 06:50 )             25.0     07-31    141  |  107  |  36<H>  ----------------------------<  141<H>  4.7   |  19<L>  |  1.25    Ca    9.0      2020 06:49  Phos  3.0     07-31  Mg     1.7     07-31    TPro  5.4<L>  /  Alb  3.2<L>  /  TBili  0.5  /  DBili  0.2  /  AST  26  /  ALT  29  /  AlkPhos  104  07-31    PT/INR - ( 2020 06:48 )   PT: 12.3 sec;   INR: 1.04 ratio         PTT - ( 2020 06:48 )  PTT:28.2 sec  Urinalysis Basic - ( 2020 18:15 )    Color: Light Yellow / Appearance: Clear / S.022 / pH: x  Gluc: x / Ketone: Negative  / Bili: Negative / Urobili: Negative   Blood: x / Protein: Trace / Nitrite: Negative   Leuk Esterase: Negative / RBC: 4 /hpf / WBC 11 /HPF   Sq Epi: x / Non Sq Epi: 0 /hpf / Bacteria: Negative      CAPILLARY BLOOD GLUCOSE      POCT Blood Glucose.: 172 mg/dL (2020 15:00)  POCT Blood Glucose.: 231 mg/dL (2020 11:52)  POCT Blood Glucose.: 148 mg/dL (2020 08:45)  POCT Blood Glucose.: 165 mg/dL (2020 21:38)      Lower Extremity Physical Exam:  Vascular: DP 2/4 PT 1/4 B/L, CFT <3 seconds B/L, Temperature gradient warm to cool B/L  Neuro: Epicritic sensation diminished to the level of heel B/L  Musculoskeletal/Ortho: no gross deformities  Skin:   Wound #1: right foot  Location: posterior heel  Size: 5 x 4 cm  Depth: subQ  Wound bed: granular tissue   Drainage: none  Odor: none  Periwound: no clinical signs of infection  Etiology: pressure, diabetes

## 2020-07-31 NOTE — PROGRESS NOTE ADULT - SUBJECTIVE AND OBJECTIVE BOX
INTERVAL HPI/OVERNIGHT EVENTS:    patient seen and examined   remains confused, but more awake and interactive today   still tremulous, but overall improved      MEDICATIONS  (STANDING):  aspirin enteric coated 81 milliGRAM(s) Oral daily  chlorhexidine 2% Cloths 1 Application(s) Topical <User Schedule>  collagenase Ointment 1 Application(s) Topical daily  furosemide    Tablet 80 milliGRAM(s) Oral every 8 hours  insulin glargine Injectable (LANTUS) 16 Unit(s) SubCutaneous at bedtime  insulin lispro (HumaLOG) corrective regimen sliding scale   SubCutaneous three times a day before meals  insulin lispro (HumaLOG) corrective regimen sliding scale   SubCutaneous at bedtime  insulin lispro Injectable (HumaLOG) 10 Unit(s) SubCutaneous before dinner  insulin lispro Injectable (HumaLOG) 8 Unit(s) SubCutaneous before breakfast  insulin lispro Injectable (HumaLOG) 8 Unit(s) SubCutaneous before lunch  linezolid  IVPB 600 milliGRAM(s) IV Intermittent every 12 hours  magnesium oxide 800 milliGRAM(s) Oral two times a day with meals  meropenem  IVPB 1000 milliGRAM(s) IV Intermittent every 8 hours  nystatin    Suspension 966099 Unit(s) Swish and Swallow four times a day  nystatin Cream 1 Application(s) Topical two times a day  pantoprazole  Injectable 40 milliGRAM(s) IV Push every 12 hours  polyethylene glycol 3350 17 Gram(s) Oral daily  predniSONE   Tablet 5 milliGRAM(s) Oral daily  senna 2 Tablet(s) Oral at bedtime  tacrolimus 3 milliGRAM(s) Oral <User Schedule>  tacrolimus 2 milliGRAM(s) Oral <User Schedule>  tamsulosin 0.8 milliGRAM(s) Oral at bedtime  trimethoprim   80 mG/sulfamethoxazole 400 mG 1 Tablet(s) Oral daily  valGANciclovir 450 milliGRAM(s) Oral daily    MEDICATIONS  (PRN):  glucagon  Injectable 1 milliGRAM(s) IntraMuscular once PRN Glucose <70 milliGRAM(s)/deciLiter      Allergies    codeine (Anaphylaxis)    Intolerances        Review of Systems: unable to obtain           Vital Signs Last 24 Hrs  T(C): 36.3 (12 Jul 2020 15:00), Max: 36.8 (11 Jul 2020 19:00)  T(F): 97.3 (12 Jul 2020 15:00), Max: 98.2 (11 Jul 2020 19:00)  HR: 80 (12 Jul 2020 15:00) (72 - 83)  BP: 109/59 (12 Jul 2020 15:00) (94/55 - 154/68)  BP(mean): 78 (12 Jul 2020 15:00) (70 - 125)  RR: 15 (12 Jul 2020 15:00) (9 - 28)  SpO2: 96% (12 Jul 2020 15:00) (96% - 100%)    PHYSICAL EXAM:    Constitutional: NAD   HEENT: sclera anicteric   Neck: No LAD, supple  Gastrointestinal: BS+, soft, NT, ND, incisions c/d/i  Extremities: +b/l peripheral edema  Neurological: confused, less tremulous     LABS:                        9.3    5.51  )-----------( 98       ( 12 Jul 2020 01:54 )             28.6     07-12    131<L>  |  95<L>  |  31<H>  ----------------------------<  217<H>  5.0   |  27  |  1.26    Ca    8.1<L>      12 Jul 2020 12:22  Phos  4.0     07-12  Mg     2.0     07-12    TPro  4.4<L>  /  Alb  2.6<L>  /  TBili  1.1  /  DBili  0.5<H>  /  AST  64<H>  /  ALT  98<H>  /  AlkPhos  212<H>  07-12    PT/INR - ( 12 Jul 2020 01:54 )   PT: 12.7 sec;   INR: 1.12 ratio         PTT - ( 12 Jul 2020 01:54 )  PTT:28.7 sec      RADIOLOGY & ADDITIONAL TESTS:

## 2020-07-31 NOTE — PROGRESS NOTE ADULT - SUBJECTIVE AND OBJECTIVE BOX
Follow Up:  post-OLT on , Blood Cultures (7/10) with VRE    Interval History/ROS:Patient is a 64y old  Male who presents with a chief complaint of Liver failure, hepatic encephalopathy (2020 13:06)  - Pt was in CTH this morning  - Primary team also ordered MRI  - WBC=3.42, Cr=1.25  - Everolimus level=5.5 (3-8)  - UA negative on     Allergies  codeine (Anaphylaxis)    ANTIMICROBIALS:    nystatin    Suspension 044077 four times a day  trimethoprim   80 mG/sulfamethoxazole 400 mG 1 daily  valGANciclovir 50 mG/mL Oral Solution 900 daily    OTHER MEDS:  MEDICATIONS  (STANDING):  aspirin enteric coated 81 daily  everolimus (ZORTRESS) 3 <User Schedule>  insulin lispro (HumaLOG) corrective regimen sliding scale  Before meals and at bedtime  mycophenolate mofetil 750 <User Schedule>  pantoprazole    Tablet 40 before breakfast  predniSONE   Tablet 5 daily  tamsulosin 0.8 at bedtime  traZODone 25 at bedtime    Vital Signs Last 24 Hrs  T(F): 99 (20 @ 09:00), Max: 99.6 (20 @ 17:48)    Vital Signs Last 24 Hrs  HR: 89 (20 @ 10:01) (78 - 94)  BP: 135/83 (20 @ 09:00) (123/66 - 145/75)  RR: 20 (20 @ 10:01)  SpO2: 98% (20 @ 10:01) (97% - 100%)  Wt(kg): --    EXAM:  Constitutional: Not in acute distress. Sleeping, jerking, not waking up.  Eyes: pupils bilaterally reactive to light. No icterus.  Oral cavity: Clear, no lesions  Neck: No neck vein distension noted  RS: Chest clear to auscultation bilaterally. No wheeze/rhonchi/crepitations.  CVS: S1, S2 heard. Regular rate and rhythm. No murmurs/rubs/gallops.  Abdomen: Liver transplant suture clean+. Soft. No guarding/rigidity/tenderness. Left groin suture clean+  : No acute abnormalities  Extremities: Warm. No pedal edema  Skin: No lesions noted  Vascular: No evidence of phlebitis  Neuro: A&O*2    Labs:                        8.4    3.42  )-----------( 125      ( 2020 06:50 )             25.0         141  |  107  |  36<H>  ----------------------------<  141<H>  4.7   |  19<L>  |  1.25    Ca    9.0      2020 06:49  Phos  3.0       Mg     1.7         TPro  5.4<L>  /  Alb  3.2<L>  /  TBili  0.5  /  DBili  0.2  /  AST  26  /  ALT  29  /  AlkPhos  104      WBC Trend:  WBC Count: 3.42 (20 @ 06:50)  WBC Count: 4.38 (20 @ 06:26)  WBC Count: 3.72 (20 @ 05:53)  WBC Count: 3.92 (20 @ 18:33)  WBC Count: 4.13 (20 @ 06:15)  WBC Count: 3.46 (20 @ 05:26)    Creatine Trend:  Creatinine, Serum: 1.25 ()  Creatinine, Serum: 1.62 ()  Creatinine, Serum: 1.33 ()  Creatinine, Serum: 1.31 ()  Creatinine, Serum: 1.41 ()  Creatinine, Serum: 1.36 ()  Creatinine, Serum: 1.52 ()    Liver Biochemical Testing Trend:  Alanine Aminotransferase (ALT/SGPT): 29 ()  Alanine Aminotransferase (ALT/SGPT): 16 ()  Alanine Aminotransferase (ALT/SGPT): 15 ()  Aspartate Aminotransferase (AST/SGOT): 26 (20 @ 06:49)  Aspartate Aminotransferase (AST/SGOT): 13 (20 @ 06:26)  Aspartate Aminotransferase (AST/SGOT): 16 (20 @ 05:53)  Bilirubin Direct, Serum: 0.2 ()  Bilirubin Total, Serum: 0.5 ()  Bilirubin Direct, Serum: 0.3 ()  Bilirubin Total, Serum: 0.7 (-30)  Bilirubin Direct, Serum: 0.3 (-)  Bilirubin Total, Serum: 0.7 (-29)    Urinalysis Basic - ( 2020 18:15 )    Color: Light Yellow / Appearance: Clear / S.022 / pH: x  Gluc: x / Ketone: Negative  / Bili: Negative / Urobili: Negative   Blood: x / Protein: Trace / Nitrite: Negative   Leuk Esterase: Negative / RBC: 4 /hpf / WBC 11 /HPF   Sq Epi: x / Non Sq Epi: 0 /hpf / Bacteria: Negative        MICROBIOLOGY:    Culture - Blood (07.10.20 @ 00:22)    -  Daptomycin: S 4    -  Vancomycin: R >16    Gram Stain:   Growth in aerobic bottle: Gram positive cocci in pairs    -  Linezolid: S 1    -  Ampicillin: R >8 Predicts results to ampicillin/sulbactam, amoxacillin-clavulanate and  piperacillin-tazobactam.    -  Gentamicin synergy: S    -  Streptomycin synergy: R    -  Vancomycin resistant Enterococcus sp.: Detec    Specimen Source: .Blood Blood-Peripheral    Organism: Enterococcus faecium (vancomycin resistant)    Organism: Blood Culture PCR    Culture Results:   Growth in aerobic bottle: Enterococcus faecium (vancomycin resistant)  "Due to technical problems, Proteus sp. will Not be reported as part of  the BCID panel until further notice"    OTHER TESTS:  COVID-19 PCR: NotDetec (20 @ 06:44)  Procalcitonin, Serum: 0.21 ( @ 06:15)    Cyclosporine Level (.20 @ 07:30)    Cyclosporine Level: 64    Cyclosporine Level (.20 @ 09:00)    Cyclosporine Level: 272 Follow Up:  post-OLT on , Blood Cultures (7/10) with VRE    Interval History/ROS:Patient is a 64y old  Male who presents with a chief complaint of Liver failure, hepatic encephalopathy (2020 13:06)  - Pt was in CTH this morning  - Primary team also ordered MRI  - WBC=3.42, Cr=1.25  - Everolimus level=5.5 (3-8)  - UA negative on   - Seems to be more oriented today, but still has tremor and fell asleep during conversation and woke up after few seconds    Allergies  codeine (Anaphylaxis)    ANTIMICROBIALS:    nystatin    Suspension [id number] four times a day  trimethoprim   80 mG/sulfamethoxazole 400 mG 1 daily  valGANciclovir 50 mG/mL Oral Solution 900 daily    OTHER MEDS:  MEDICATIONS  (STANDING):  aspirin enteric coated 81 daily  everolimus (ZORTRESS) 3 <User Schedule>  insulin lispro (HumaLOG) corrective regimen sliding scale  Before meals and at bedtime  mycophenolate mofetil 750 <User Schedule>  pantoprazole    Tablet 40 before breakfast  predniSONE   Tablet 5 daily  tamsulosin 0.8 at bedtime  traZODone 25 at bedtime    Vital Signs Last 24 Hrs  T(F): 99 (20 @ 09:00), Max: 99.6 (20 @ 17:48)    Vital Signs Last 24 Hrs  HR: 89 (20 @ 10:01) (78 - 94)  BP: 135/83 (20 @ 09:00) (123/66 - 145/75)  RR: 20 (20 @ 10:01)  SpO2: 98% (20 @ 10:01) (97% - 100%)  Wt(kg): --    EXAM:  Constitutional: Not in acute distress. Tremor+. Falling asleep while talking.  Eyes: pupils bilaterally reactive to light. No icterus.  Oral cavity: Clear, no lesions  Neck: No neck vein distension noted  RS: Chest clear to auscultation bilaterally. No wheeze/rhonchi/crepitations.  CVS: S1, S2 heard. Regular rate and rhythm. No murmurs/rubs/gallops.  Abdomen: Liver transplant suture clean+. Soft. No guarding/rigidity/tenderness. Left groin suture clean+  : No acute abnormalities  Extremities: Warm. No pedal edema  Skin: No lesions noted  Vascular: No evidence of phlebitis  Neuro: A&O*2    Labs:                        8.4    3.42  )-----------( 125      ( 2020 06:50 )             25.0         141  |  107  |  36<H>  ----------------------------<  141<H>  4.7   |  19<L>  |  1.25    Ca    9.0      2020 06:49  Phos  3.0       Mg     1.7         TPro  5.4<L>  /  Alb  3.2<L>  /  TBili  0.5  /  DBili  0.2  /  AST  26  /  ALT  29  /  AlkPhos  104      WBC Trend:  WBC Count: 3.42 (20 @ 06:50)  WBC Count: 4.38 (20 @ 06:26)  WBC Count: 3.72 (20 @ 05:53)  WBC Count: 3.92 (20 @ 18:33)  WBC Count: 4.13 (20 @ 06:15)  WBC Count: 3.46 (20 @ 05:26)    Creatine Trend:  Creatinine, Serum: 1.25 ()  Creatinine, Serum: 1.62 ()  Creatinine, Serum: 1.33 ()  Creatinine, Serum: 1.31 ()  Creatinine, Serum: 1.41 ()  Creatinine, Serum: 1.36 ()  Creatinine, Serum: 1.52 ()    Liver Biochemical Testing Trend:  Alanine Aminotransferase (ALT/SGPT): 29 ()  Alanine Aminotransferase (ALT/SGPT): 16 ()  Alanine Aminotransferase (ALT/SGPT): 15 ()  Aspartate Aminotransferase (AST/SGOT): 26 (20 @ 06:49)  Aspartate Aminotransferase (AST/SGOT): 13 (20 @ 06:26)  Aspartate Aminotransferase (AST/SGOT): 16 (20 @ 05:53)  Bilirubin Direct, Serum: 0.2 ()  Bilirubin Total, Serum: 0.5 (07-31)  Bilirubin Direct, Serum: 0.3 (07-30)  Bilirubin Total, Serum: 0.7 (07-30)  Bilirubin Direct, Serum: 0.3 (07-29)  Bilirubin Total, Serum: 0.7 (07-29)    Urinalysis Basic - ( 2020 18:15 )    Color: Light Yellow / Appearance: Clear / S.022 / pH: x  Gluc: x / Ketone: Negative  / Bili: Negative / Urobili: Negative   Blood: x / Protein: Trace / Nitrite: Negative   Leuk Esterase: Negative / RBC: 4 /hpf / WBC 11 /HPF   Sq Epi: x / Non Sq Epi: 0 /hpf / Bacteria: Negative        MICROBIOLOGY:    Culture - Blood (07.10.20 @ 00:22)    -  Daptomycin: S 4    -  Vancomycin: R >16    Gram Stain:   Growth in aerobic bottle: Gram positive cocci in pairs    -  Linezolid: S 1    -  Ampicillin: R >8 Predicts results to ampicillin/sulbactam, amoxacillin-clavulanate and  piperacillin-tazobactam.    -  Gentamicin synergy: S    -  Streptomycin synergy: R    -  Vancomycin resistant Enterococcus sp.: Detec    Specimen Source: .Blood Blood-Peripheral    Organism: Enterococcus faecium (vancomycin resistant)    Organism: Blood Culture PCR    Culture Results:   Growth in aerobic bottle: Enterococcus faecium (vancomycin resistant)  "Due to technical problems, Proteus sp. will Not be reported as part of  the BCID panel until further notice"    OTHER TESTS:  COVID-19 PCR: NotDetec (20 @ 06:44)  Procalcitonin, Serum: 0.21 ( @ 06:15)    Cyclosporine Level (31.20 @ 11:37)    Cyclosporine Level: 38    Cyclosporine Level (.30.20 @ 07:30)    Cyclosporine Level: 64    Cyclosporine Level (07.27.20 @ 09:00)    Cyclosporine Level: 272    Radiology:  < from: CT Head No Cont (20 @ 10:49) >  IMPRESSION:  Slight progression to atrophic changes compared with 2020 which may be the result of treatment-related changes. No other significant pathology    < end of copied text >

## 2020-07-31 NOTE — PROGRESS NOTE ADULT - ASSESSMENT
64 M hx of DM, HLD, GERD, HFpEF with mild LV diastolic dysfunction, and decompensated JEAN cirrhosis c/b ascites with hx of SBP, HE, duodenal ulcer, GAVE and duodenal AVM s/p APC (last on 10/11/19), s/p liver transplant this admission (7/2/20). Post-op course c/b tacrolimus toxicity and hospital derlirium.      Impression:  #S/p liver transplant 7/2: liver enzymes continue to downtrend  OR details:  - L groin cutdown for vv bypass   - anastomosis: bicaval end-end/CBD duct-duct/HA & PV end-end, all standard anatomy.    - IOC with good flow  - Simulect induction. 500mg Solumedrol  - JPs x3 with T-Tube  - 14U pRBC, 14U FFP, 10 PLT, 11 CRYO, 500mL returned from cell saver, EBL 5L, UOP 1100mL  Recipient Info:   ABO: A  CMV:  Neg  EBV Positive  Donor:  Donor ID:  [id number] Match: [id number]  Age: 29 ABO:  A1 High Risk:   No COD: Cardiovascular  Anti CMV positive, EBV IgG- positive  HepBcAb-neg, Hepatitis C-DANA- neg, Hepatitis C ab-neg    #Halluciniations/agitation/AMS/tremors – due to CNI toxicity, improving now that pt is off CNI and on everolimus  #Sacral decubitus ulcer: stage I, does not appear infected  #GI bleed: Resolved. Hgb stable with no overt bleeding post-transplant. 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 GAVE and PHG likely improved pos ttransplant.  #R posterior heel wound w/ overlying eschar –Local wound care. no signs of infections, XR neg for gas, evaluated by podiatry and ID. MRI w/o contrast limited, but no overt signs of active infection.  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     Recommendation:  - continue with everolimus dosed per transplant surgery (2.5mg BID), f/u levels  - continue Cellcept (currently on 750 mg BID) - dosing as per transplant surgery  - continue prednisone 5 mg PO daily  - c/w trazodone 25mg daily  - frequent reorientation and visitation   - normalized sleep/wake cycle environment  - continue nystatin, Bactrim and Valcyte ppx  - physical therapy daily to get OOB and ambulating  - calorie count  - continue with mag oxide 200 mg BID  - monitor Hb on daily CBC - continue PO PPI BID, monitor bowel movements  - c/w ASA 81mg daily  - continue to hold diuretics  - follow up Wound care recommendations for R heel ulcer and sacral decub  - follow up other Transplant surgery recommendations       Ze Dominguez  Gastroenterology Fellow  AT NIGHT AND ON WEEKENDS:  Contact on-call GI fellow via answering service ([phone]) from 5pm-8am and on weekends/holidays  MONDAY-FRIDAY 8AM-5PM:  Available via [employer] Teams  Call [phone] ([facility]) or Page [phone] ([facility]) from 8am-5pm M-F

## 2020-07-31 NOTE — PROGRESS NOTE ADULT - PROBLEM SELECTOR PLAN 3
- slowly improving since change to everolimus although still remains confused   - CTH with slight progression of atrophic changes in comparison to prior study on 06/11  - management as per transplant hepatology

## 2020-07-31 NOTE — PROGRESS NOTE ADULT - ASSESSMENT
64M w/ R posterior heel wound, stable  -Pt seen and evaluated  -R posterior heel wound to subQ stable w/ no signs of infection, improving in appearance   -XR negative for gas or OM  -No podiatric surgical intervention, will continue to monitor for signs of infection and wound care recommendations  -dress with santyl and allevyn pad daily and decubitus precautions  - follow up as outpatient upon discharge (call [phone] for appointment)

## 2020-07-31 NOTE — PROGRESS NOTE ADULT - NSTELEHEALTH_GEN_ALL_CORE
No

## 2020-07-31 NOTE — PROGRESS NOTE ADULT - SUBJECTIVE AND OBJECTIVE BOX
Chief Complaint:  Patient is a 64y old  Male who presents with a chief complaint of Liver failure, hepatic encephalopathy (31 Jul 2020 08:38)    Reason for consult: s/p OLT    Interval Events: pt remains confused, but overall improved. required haldol 0.5mg overnight.     Hospital Medications:  aspirin enteric coated 81 milliGRAM(s) Oral daily  chlorhexidine 2% Cloths 1 Application(s) Topical <User Schedule>  collagenase Ointment 1 Application(s) Topical daily  everolimus (ZORTRESS) 2.5 milliGRAM(s) Oral <User Schedule>  haloperidol    Injectable 0.5 milliGRAM(s) IV Push every 12 hours PRN  insulin lispro (HumaLOG) corrective regimen sliding scale   SubCutaneous Before meals and at bedtime  magnesium oxide 800 milliGRAM(s) Oral two times a day with meals  mycophenolate mofetil 750 milliGRAM(s) Oral <User Schedule>  nystatin    Suspension [id number] Unit(s) Oral four times a day  nystatin Cream 1 Application(s) Topical two times a day  pantoprazole    Tablet 40 milliGRAM(s) Oral before breakfast  predniSONE   Tablet 5 milliGRAM(s) Oral daily  sodium chloride 0.9%. 1000 milliLiter(s) IV Continuous <Continuous>  tamsulosin 0.8 milliGRAM(s) Oral at bedtime  traZODone 25 milliGRAM(s) Oral at bedtime  trimethoprim   80 mG/sulfamethoxazole 400 mG 1 Tablet(s) Oral daily  valGANciclovir 50 mG/mL Oral Solution 900 milliGRAM(s) Oral daily      ROS:   General:  No  fevers, chills, night sweats, fatigue  Eyes:  Good vision, no reported pain  ENT:  No sore throat, pain, runny nose  CV:  No pain, palpitations  Pulm:  No dyspnea, cough  GI:  See HPI, otherwise negative  :  No  incontinence, nocturia  Muscle:  No pain, weakness  Neuro:  No memory problems  Psych:  No insomnia, mood problems, depression  Endocrine:  No polyuria, polydipsia, cold/heat intolerance  Heme:  No petechiae, ecchymosis, easy bruisability  Skin:  No rash    PHYSICAL EXAM:   Vital Signs:  Vital Signs Last 24 Hrs  T(C): 37.2 (31 Jul 2020 09:00), Max: 37.2 (31 Jul 2020 09:00)  T(F): 99 (31 Jul 2020 09:00), Max: 99 (31 Jul 2020 09:00)  HR: 94 (31 Jul 2020 09:00) (78 - 94)  BP: 135/83 (31 Jul 2020 09:00) (123/66 - 145/75)  BP(mean): --  RR: 18 (31 Jul 2020 05:52) (18 - 20)  SpO2: 99% (31 Jul 2020 09:00) (97% - 100%)  Daily     Daily     GENERAL: no acute distress  NEURO: alert, confused, no asterixis, minimal tremors  HEENT: anicteric sclera, no conjunctival pallor appreciated  CHEST: no respiratory distress, no accessory muscle use  CARDIAC: regular rate, rhythm  ABDOMEN: soft, non-tender, non-distended, no rebound or guarding  EXTREMITIES: warm, well perfused, no edema  SKIN: no lesions noted    LABS: reviewed                        8.4    3.42  )-----------( 125      ( 31 Jul 2020 06:50 )             25.0     07-31    141  |  107  |  36<H>  ----------------------------<  141<H>  4.7   |  19<L>  |  1.25    Ca    9.0      31 Jul 2020 06:49  Phos  3.0     07-31  Mg     1.7     07-31    TPro  5.4<L>  /  Alb  3.2<L>  /  TBili  0.5  /  DBili  0.2  /  AST  26  /  ALT  29  /  AlkPhos  104  07-31    LIVER FUNCTIONS - ( 31 Jul 2020 06:49 )  Alb: 3.2 g/dL / Pro: 5.4 g/dL / ALK PHOS: 104 U/L / ALT: 29 U/L / AST: 26 U/L / GGT: x             Interval Diagnostic Studies: see sunrise for full report

## 2020-07-31 NOTE — PROGRESS NOTE ADULT - ATTENDING COMMENTS
IMMUNOSUPPRESSION: Envarsus level from 7/29: 5.5 Increased to Envarsus 3/3 and today. IMMUNOSUPPRESSION: Envarsus level from 7/29: 5.5 Increased to Envarsus 3/3 today.

## 2020-07-31 NOTE — PROGRESS NOTE ADULT - ASSESSMENT
63 y/o M PMHx decompensated JEAN Cirrhosis on transplant list, DMII, HFpEF, recent admission for ESBL E coli prostatic abscess 2/2 4 wks ertapenem, hx of ESBL UTIs with prostate abscess s/p IV abx, recent VRE urinary colonization came to hospital for worsening jaundice and episode of confusion, resolved PTA. s/p treatment course for possible VRE UTI. Now s/p OLT on 7/2/20. Course complicated by Encephalopathy and Tremors. Blood Cultures (7/10) with VRE sensitive to Linezolid. CT C/A/P without obvious intraabdominal source. Hypoxia prior to positive BCx which may be related to sepsis CT Chest (7/11) without evidence of pneumonia. Had L groin fluid collection which appears to be consistent with seroma - sample obtained for culture also negative. No drainage, erythema or underlying fluctuance was appreciated at the right heel. MRI prior to transplant without obvious infection. Transferred from SICU to [facility] on 7/17/2020.    Currently on:  nystatin    Suspension [id number] four times a day  trimethoprim   80 mG/sulfamethoxazole 400 mG 1 daily  valGANciclovir 900 daily    #VRE Bacteremia, L Groin Collection, Encephalopathy  - s/p Linezolid and Daptomycin ended on 7/26 for VRE bacteremia post-OLT  - Abd US showed 9cm Left groin collection: likely represents a seroma or lymphocele; aspirated - cx: PMN seen, no organism seen  - Continue monitoring cylosporine levels  - TTE negative for endocarditis  - UA/UC on 7/20 no UTI; UA on 7/30 negative  - BCx on 7/20 NGTD  - Switched to Everolimus on 7/27 to avoid CNI CNS Toxicity - would monitor mental status   - Check West Nile IgM and IgG (low suspicion) as this can acquired from transplant  - Follow up brain CT result    #Hypoxic Respiratory Failure, Encephalopathy  - No evidence of pneumonia and collection in left groin appears consistent with seroma  - now off the Meropenem    #s/p OLT   - Continue bactrim, valcyte (CMV D+/R-) for prophylaxis  - Monitor patient for tremors - was on tacrolimus(CI), then cylosporine(CI), now on Everolimus(mTor I)    #Right heel wound  --Podiatry on board    --Local wound care     Suyapa Mayer MD, PGY4   ID fellow  Pager: [phone]  After 5pm/weekends call [phone]    #PENDING RECS. PLEASE WAIT FOR FINAL RECS AFTER DISCUSSION WITH ATTENDING# 65 y/o M PMHx decompensated JEAN Cirrhosis on transplant list, DMII, HFpEF, recent admission for ESBL E coli prostatic abscess 2/2 4 wks ertapenem, hx of ESBL UTIs with prostate abscess s/p IV abx, recent VRE urinary colonization came to hospital for worsening jaundice and episode of confusion, resolved PTA. s/p treatment course for possible VRE UTI. Now s/p OLT on 7/2/20. Course complicated by Encephalopathy and Tremors. Blood Cultures (7/10) with VRE sensitive to Linezolid. CT C/A/P without obvious intraabdominal source. Hypoxia prior to positive BCx which may be related to sepsis CT Chest (7/11) without evidence of pneumonia. Had L groin fluid collection which appears to be consistent with seroma - sample obtained for culture also negative. No drainage, erythema or underlying fluctuance was appreciated at the right heel. MRI prior to transplant without obvious infection. Transferred from SICU to [facility] on 7/17/2020.    Currently on:  nystatin    Suspension [id number] four times a day  trimethoprim   80 mG/sulfamethoxazole 400 mG 1 daily  valGANciclovir 900 daily    #VRE Bacteremia, L Groin Collection, Encephalopathy  - s/p Linezolid and Daptomycin ended on 7/26 for VRE bacteremia post-OLT  - Abd US showed 9cm Left groin collection: likely represents a seroma or lymphocele; aspirated - cx: PMN seen, no organism seen  - Continue monitoring cylosporine levels  - TTE negative for endocarditis  - UA/UC on 7/20 no UTI; UA on 7/30 negative  - BCx on 7/20 NGTD  - Switched to Everolimus on 7/27 to avoid CNI CNS Toxicity - would monitor mental status   - Check West Nile IgM and IgG (low suspicion) as this can acquired from transplant  - CT result(7/31): Slight progression to atrophic changes compared with 6/11/2020 which may be the result of treatment-related changes. No other significant pathology  - Consider Neurology consult    #Hypoxic Respiratory Failure, Encephalopathy  - No evidence of pneumonia and collection in left groin appears consistent with seroma  - now off the Meropenem    #s/p OLT   - Continue bactrim, valcyte (CMV D+/R-) for prophylaxis  - Monitor patient for tremors - was on tacrolimus(CI), then cylosporine(CI), now on Everolimus(mTor I)    #Right heel wound  --Podiatry on board    --Local wound care     Suyapa Mayer MD, PGY4   ID fellow  Pager: [phone]  After 5pm/weekends call [phone]    d/w Dr. Menezes

## 2020-07-31 NOTE — PROGRESS NOTE ADULT - SUBJECTIVE AND OBJECTIVE BOX
OLTx      Date:  7/2/2020       POD#29    Present: Patient seen with multidisciplinary team including Transplant Surgeon Dr. Ackerman. Nephrologist Dr. Rowland, renal fellow. Pharmacist: ZBIGNIEW Savage, OLY Burciaga, and unit RN during am rounds and examined by  Dr. Ackerman. Disciplines not in attendance will be notified of the plan.    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     Re-admitted on 6/11:   ·	worsening HE  ·	UTI/VRE: tx with linezolid 6/15-22, bactrim DS 6/22 - 7/2  ·	MISA/Hyponatremia  ·	UGI bleed (melena) s/p EGD (6/22) c/w hemorrhagic duodenitis/gastritis; Grade 2 EV; requiring multiple transfusions  ·	Known h/o R foot ulcer (MRI neg for OM)  ·	s/p OLTx on 7/2/2020, post op liver doppler with patent vessels  ·	post op course c/b delirium and VRE bacteremia (7/10)     Interval events:  - POD 29 s/p OLT with good graft function;  LFTs stable  -Trazodone decreased yesterday to 25mg qhs, patient continued to be agitated in the PM yesterday.  -This morning appears more awake and alert than he has been recently, although still confused  -Oral intake improved    Potential Discharge date: pending clinical improvement     Education:  Medications    Plan of care:  See Below      MEDICATIONS  (STANDING):  aspirin enteric coated 81 milliGRAM(s) Oral daily  chlorhexidine 2% Cloths 1 Application(s) Topical <User Schedule>  collagenase Ointment 1 Application(s) Topical daily  everolimus (ZORTRESS) 3 milliGRAM(s) Oral <User Schedule>  insulin lispro (HumaLOG) corrective regimen sliding scale   SubCutaneous Before meals and at bedtime  magnesium oxide 800 milliGRAM(s) Oral two times a day with meals  mycophenolate mofetil 750 milliGRAM(s) Oral <User Schedule>  nystatin    Suspension 035549 Unit(s) Oral four times a day  nystatin Cream 1 Application(s) Topical two times a day  pantoprazole    Tablet 40 milliGRAM(s) Oral before breakfast  predniSONE   Tablet 5 milliGRAM(s) Oral daily  sodium chloride 0.9%. 1000 milliLiter(s) (75 mL/Hr) IV Continuous <Continuous>  tamsulosin 0.8 milliGRAM(s) Oral at bedtime  traZODone 25 milliGRAM(s) Oral at bedtime  trimethoprim   80 mG/sulfamethoxazole 400 mG 1 Tablet(s) Oral daily  valGANciclovir 50 mG/mL Oral Solution 900 milliGRAM(s) Oral daily        PAST MEDICAL & SURGICAL HISTORY:  GIB (gastrointestinal bleeding)  GERD with esophagitis: Gastritis &amp; Non Bleeding Ulcers  Hepatic encephalopathy  Obesity  Fatty liver disease, nonalcoholic  Renal stones: 25 years ago  Hypertension  Neuropathy  Hypercholesteremia  Diabetes  S/P cholecystectomy      MEDICATIONS  (STANDING):  aspirin enteric coated 81 milliGRAM(s) Oral daily  chlorhexidine 2% Cloths 1 Application(s) Topical <User Schedule>  collagenase Ointment 1 Application(s) Topical daily  everolimus (ZORTRESS) 3 milliGRAM(s) Oral <User Schedule>  insulin lispro (HumaLOG) corrective regimen sliding scale   SubCutaneous Before meals and at bedtime  magnesium oxide 800 milliGRAM(s) Oral two times a day with meals  mycophenolate mofetil 750 milliGRAM(s) Oral <User Schedule>  nystatin    Suspension 957861 Unit(s) Oral four times a day  nystatin Cream 1 Application(s) Topical two times a day  pantoprazole    Tablet 40 milliGRAM(s) Oral before breakfast  predniSONE   Tablet 5 milliGRAM(s) Oral daily  sodium chloride 0.9%. 1000 milliLiter(s) (75 mL/Hr) IV Continuous <Continuous>  tamsulosin 0.8 milliGRAM(s) Oral at bedtime  traZODone 25 milliGRAM(s) Oral at bedtime  trimethoprim   80 mG/sulfamethoxazole 400 mG 1 Tablet(s) Oral daily  valGANciclovir 50 mG/mL Oral Solution 900 milliGRAM(s) Oral daily    MEDICATIONS  (PRN):      PAST MEDICAL & SURGICAL HISTORY:  GIB (gastrointestinal bleeding)  GERD with esophagitis: Gastritis &amp; Non Bleeding Ulcers  Hepatic encephalopathy  Obesity  Fatty liver disease, nonalcoholic  Renal stones: 25 years ago  Hypertension  Neuropathy  Hypercholesteremia  Diabetes  S/P cholecystectomy      Vital Signs Last 24 Hrs  T(C): 37.2 (31 Jul 2020 09:00), Max: 37.2 (31 Jul 2020 09:00)  T(F): 99 (31 Jul 2020 09:00), Max: 99 (31 Jul 2020 09:00)  HR: 89 (31 Jul 2020 10:01) (78 - 94)  BP: 135/83 (31 Jul 2020 09:00) (123/66 - 145/75)  BP(mean): --  RR: 20 (31 Jul 2020 10:01) (18 - 20)  SpO2: 98% (31 Jul 2020 10:01) (97% - 100%)    I&O's Summary    30 Jul 2020 07:01  -  31 Jul 2020 07:00  --------------------------------------------------------  IN: 1660 mL / OUT: 1235 mL / NET: 425 mL                              8.4    3.42  )-----------( 125      ( 31 Jul 2020 06:50 )             25.0     07-31    141  |  107  |  36<H>  ----------------------------<  141<H>  4.7   |  19<L>  |  1.25    Ca    9.0      31 Jul 2020 06:49  Phos  3.0     07-31  Mg     1.7     07-31    TPro  5.4<L>  /  Alb  3.2<L>  /  TBili  0.5  /  DBili  0.2  /  AST  26  /  ALT  29  /  AlkPhos  104  07-31        ROS  Unable to obtain due to AMS    Physical Exam  Constitutional: more awake and alert, calm, cooperative, confused speech  Eyes: Anicteric, PERRLA  ENMT: nc/at  Neck: no central line, no JVD  Respiratory: CTA B/L, unlabored breathing   Cardiovascular: RRR  Gastrointestinal: soft, NT, ND, incision c/d/i, T-tube tied   Genitourinary: Voiding spontaneously  Extremities: heel wound without drainage, edema improving   Vascular: Palpable dp pulses bilaterally  Neurological: Alert, oriented x2-3 today, less tremors, TORREZ  Skin: no rashes  Musculoskeletal: Moving all extremities  Psychiatric: Responsive OLTx      Date:  7/2/2020       POD#29    Present: Patient seen with multidisciplinary team including Transplant Surgeon Dr. Alonzo, General Surgery Resideny, Dr. Dickinson, Nephrologist Dr. Rowland, renal fellow. Pharmacist: OLY Savage, and unit RN during am rounds and examined by  Dr. Ackerman. Disciplines not in attendance will be notified of the plan.    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     Re-admitted on 6/11:   ·	worsening HE  ·	UTI/VRE: tx with linezolid 6/15-22, bactrim DS 6/22 - 7/2  ·	MISA/Hyponatremia  ·	UGI bleed (melena) s/p EGD (6/22) c/w hemorrhagic duodenitis/gastritis; Grade 2 EV; requiring multiple transfusions  ·	Known h/o R foot ulcer (MRI neg for OM)  ·	s/p OLTx on 7/2/2020, post op liver doppler with patent vessels  ·	post op course c/b delirium and VRE bacteremia (7/10)     Interval events:  - POD 29 s/p OLT with good graft function;  LFTs stable  -Trazodone decreased yesterday to 25mg qhs, patient continued to be agitated in the PM yesterday.  -This morning appears more awake and alert than he has been recently, although still confused  -Oral intake improved    Potential Discharge date: pending clinical improvement     Education:  Medications    Plan of care:  See Below      MEDICATIONS  (STANDING):  aspirin enteric coated 81 milliGRAM(s) Oral daily  chlorhexidine 2% Cloths 1 Application(s) Topical <User Schedule>  collagenase Ointment 1 Application(s) Topical daily  everolimus (ZORTRESS) 3 milliGRAM(s) Oral <User Schedule>  insulin lispro (HumaLOG) corrective regimen sliding scale   SubCutaneous Before meals and at bedtime  magnesium oxide 800 milliGRAM(s) Oral two times a day with meals  mycophenolate mofetil 750 milliGRAM(s) Oral <User Schedule>  nystatin    Suspension [id number] Unit(s) Oral four times a day  nystatin Cream 1 Application(s) Topical two times a day  pantoprazole    Tablet 40 milliGRAM(s) Oral before breakfast  predniSONE   Tablet 5 milliGRAM(s) Oral daily  sodium chloride 0.9%. 1000 milliLiter(s) (75 mL/Hr) IV Continuous <Continuous>  tamsulosin 0.8 milliGRAM(s) Oral at bedtime  traZODone 25 milliGRAM(s) Oral at bedtime  trimethoprim   80 mG/sulfamethoxazole 400 mG 1 Tablet(s) Oral daily  valGANciclovir 50 mG/mL Oral Solution 900 milliGRAM(s) Oral daily        PAST MEDICAL & SURGICAL HISTORY:  GIB (gastrointestinal bleeding)  GERD with esophagitis: Gastritis &amp; Non Bleeding Ulcers  Hepatic encephalopathy  Obesity  Fatty liver disease, nonalcoholic  Renal stones: 25 years ago  Hypertension  Neuropathy  Hypercholesteremia  Diabetes  S/P cholecystectomy      MEDICATIONS  (STANDING):  aspirin enteric coated 81 milliGRAM(s) Oral daily  chlorhexidine 2% Cloths 1 Application(s) Topical <User Schedule>  collagenase Ointment 1 Application(s) Topical daily  everolimus (ZORTRESS) 3 milliGRAM(s) Oral <User Schedule>  insulin lispro (HumaLOG) corrective regimen sliding scale   SubCutaneous Before meals and at bedtime  magnesium oxide 800 milliGRAM(s) Oral two times a day with meals  mycophenolate mofetil 750 milliGRAM(s) Oral <User Schedule>  nystatin    Suspension 229136 Unit(s) Oral four times a day  nystatin Cream 1 Application(s) Topical two times a day  pantoprazole    Tablet 40 milliGRAM(s) Oral before breakfast  predniSONE   Tablet 5 milliGRAM(s) Oral daily  sodium chloride 0.9%. 1000 milliLiter(s) (75 mL/Hr) IV Continuous <Continuous>  tamsulosin 0.8 milliGRAM(s) Oral at bedtime  traZODone 25 milliGRAM(s) Oral at bedtime  trimethoprim   80 mG/sulfamethoxazole 400 mG 1 Tablet(s) Oral daily  valGANciclovir 50 mG/mL Oral Solution 900 milliGRAM(s) Oral daily    MEDICATIONS  (PRN):      PAST MEDICAL & SURGICAL HISTORY:  GIB (gastrointestinal bleeding)  GERD with esophagitis: Gastritis &amp; Non Bleeding Ulcers  Hepatic encephalopathy  Obesity  Fatty liver disease, nonalcoholic  Renal stones: 25 years ago  Hypertension  Neuropathy  Hypercholesteremia  Diabetes  S/P cholecystectomy      Vital Signs Last 24 Hrs  T(C): 37.2 (31 Jul 2020 09:00), Max: 37.2 (31 Jul 2020 09:00)  T(F): 99 (31 Jul 2020 09:00), Max: 99 (31 Jul 2020 09:00)  HR: 89 (31 Jul 2020 10:01) (78 - 94)  BP: 135/83 (31 Jul 2020 09:00) (123/66 - 145/75)  BP(mean): --  RR: 20 (31 Jul 2020 10:01) (18 - 20)  SpO2: 98% (31 Jul 2020 10:01) (97% - 100%)    I&O's Summary    30 Jul 2020 07:01  -  31 Jul 2020 07:00  --------------------------------------------------------  IN: 1660 mL / OUT: 1235 mL / NET: 425 mL                              8.4    3.42  )-----------( 125      ( 31 Jul 2020 06:50 )             25.0     07-31    141  |  107  |  36<H>  ----------------------------<  141<H>  4.7   |  19<L>  |  1.25    Ca    9.0      31 Jul 2020 06:49  Phos  3.0     07-31  Mg     1.7     07-31    TPro  5.4<L>  /  Alb  3.2<L>  /  TBili  0.5  /  DBili  0.2  /  AST  26  /  ALT  29  /  AlkPhos  104  07-31        ROS  Unable to obtain due to AMS    Physical Exam  Constitutional: more awake and alert, calm, cooperative, confused speech  Eyes: Anicteric, PERRLA  ENMT: nc/at  Neck: no central line, no JVD  Respiratory: CTA B/L, unlabored breathing   Cardiovascular: RRR  Gastrointestinal: soft, NT, ND, incision c/d/i, T-tube tied   Genitourinary: Voiding spontaneously  Extremities: heel wound without drainage, edema improving   Vascular: Palpable dp pulses bilaterally  Neurological: Alert, oriented x2-3 today, less tremors, TORREZ  Skin: no rashes  Musculoskeletal: Moving all extremities  Psychiatric: Responsive

## 2020-08-01 LAB
ALBUMIN SERPL ELPH-MCNC: 3.3 G/DL — SIGNIFICANT CHANGE UP (ref 3.3–5)
ALP SERPL-CCNC: 116 U/L — SIGNIFICANT CHANGE UP (ref 40–120)
ALT FLD-CCNC: 30 U/L — SIGNIFICANT CHANGE UP (ref 10–45)
AMMONIA BLD-MCNC: 40 UMOL/L — SIGNIFICANT CHANGE UP (ref 11–55)
ANION GAP SERPL CALC-SCNC: 12 MMOL/L — SIGNIFICANT CHANGE UP (ref 5–17)
APTT BLD: 28.6 SEC — SIGNIFICANT CHANGE UP (ref 27.5–35.5)
AST SERPL-CCNC: 24 U/L — SIGNIFICANT CHANGE UP (ref 10–40)
BILIRUB DIRECT SERPL-MCNC: 0.2 MG/DL — SIGNIFICANT CHANGE UP (ref 0–0.2)
BILIRUB INDIRECT FLD-MCNC: 0.3 MG/DL — SIGNIFICANT CHANGE UP (ref 0.2–1)
BILIRUB SERPL-MCNC: 0.5 MG/DL — SIGNIFICANT CHANGE UP (ref 0.2–1.2)
BLD GP AB SCN SERPL QL: NEGATIVE — SIGNIFICANT CHANGE UP
BUN SERPL-MCNC: 37 MG/DL — HIGH (ref 7–23)
CALCIUM SERPL-MCNC: 9.2 MG/DL — SIGNIFICANT CHANGE UP (ref 8.4–10.5)
CHLORIDE SERPL-SCNC: 106 MMOL/L — SIGNIFICANT CHANGE UP (ref 96–108)
CHOLEST SERPL-MCNC: 159 MG/DL — SIGNIFICANT CHANGE UP (ref 10–199)
CO2 SERPL-SCNC: 22 MMOL/L — SIGNIFICANT CHANGE UP (ref 22–31)
CREAT SERPL-MCNC: 1.37 MG/DL — HIGH (ref 0.5–1.3)
CULTURE RESULTS: SIGNIFICANT CHANGE UP
CYCLOSPORINE SER-MCNC: 32 NG/ML — LOW (ref 150–400)
GLUCOSE BLDC GLUCOMTR-MCNC: 188 MG/DL — HIGH (ref 70–99)
GLUCOSE BLDC GLUCOMTR-MCNC: 191 MG/DL — HIGH (ref 70–99)
GLUCOSE BLDC GLUCOMTR-MCNC: 231 MG/DL — HIGH (ref 70–99)
GLUCOSE BLDC GLUCOMTR-MCNC: 275 MG/DL — HIGH (ref 70–99)
GLUCOSE SERPL-MCNC: 172 MG/DL — HIGH (ref 70–99)
HCT VFR BLD CALC: 24.2 % — LOW (ref 39–50)
HDLC SERPL-MCNC: 45 MG/DL — SIGNIFICANT CHANGE UP
HGB BLD-MCNC: 8 G/DL — LOW (ref 13–17)
INR BLD: 1.04 RATIO — SIGNIFICANT CHANGE UP (ref 0.88–1.16)
LDLC SERPL DIRECT ASSAY-MCNC: 93 MG/DL — SIGNIFICANT CHANGE UP
MAGNESIUM SERPL-MCNC: 1.9 MG/DL — SIGNIFICANT CHANGE UP (ref 1.6–2.6)
MCHC RBC-ENTMCNC: 31.9 PG — SIGNIFICANT CHANGE UP (ref 27–34)
MCHC RBC-ENTMCNC: 33.1 GM/DL — SIGNIFICANT CHANGE UP (ref 32–36)
MCV RBC AUTO: 96.4 FL — SIGNIFICANT CHANGE UP (ref 80–100)
NRBC # BLD: 0 /100 WBCS — SIGNIFICANT CHANGE UP (ref 0–0)
PHOSPHATE SERPL-MCNC: 3.1 MG/DL — SIGNIFICANT CHANGE UP (ref 2.5–4.5)
PLATELET # BLD AUTO: 121 K/UL — LOW (ref 150–400)
POTASSIUM SERPL-MCNC: 4.7 MMOL/L — SIGNIFICANT CHANGE UP (ref 3.5–5.3)
POTASSIUM SERPL-SCNC: 4.7 MMOL/L — SIGNIFICANT CHANGE UP (ref 3.5–5.3)
PROCALCITONIN SERPL-MCNC: 0.19 NG/ML — HIGH (ref 0.02–0.1)
PROT SERPL-MCNC: 5.5 G/DL — LOW (ref 6–8.3)
PROTHROM AB SERPL-ACNC: 12.3 SEC — SIGNIFICANT CHANGE UP (ref 10.6–13.6)
RBC # BLD: 2.51 M/UL — LOW (ref 4.2–5.8)
RBC # FLD: 18.3 % — HIGH (ref 10.3–14.5)
RH IG SCN BLD-IMP: POSITIVE — SIGNIFICANT CHANGE UP
SODIUM SERPL-SCNC: 140 MMOL/L — SIGNIFICANT CHANGE UP (ref 135–145)
SPECIMEN SOURCE: SIGNIFICANT CHANGE UP
TRIGL SERPL-MCNC: 97 MG/DL — SIGNIFICANT CHANGE UP (ref 10–149)
WBC # BLD: 3.53 K/UL — LOW (ref 3.8–10.5)
WBC # FLD AUTO: 3.53 K/UL — LOW (ref 3.8–10.5)

## 2020-08-01 PROCEDURE — 99232 SBSQ HOSP IP/OBS MODERATE 35: CPT | Mod: GC,24

## 2020-08-01 PROCEDURE — 99232 SBSQ HOSP IP/OBS MODERATE 35: CPT

## 2020-08-01 RX ORDER — MAGNESIUM SULFATE 500 MG/ML
1 VIAL (ML) INJECTION ONCE
Refills: 0 | Status: COMPLETED | OUTPATIENT
Start: 2020-08-01 | End: 2020-08-01

## 2020-08-01 RX ORDER — EVEROLIMUS 10 MG/1
3.5 TABLET ORAL
Refills: 0 | Status: DISCONTINUED | OUTPATIENT
Start: 2020-08-01 | End: 2020-08-07

## 2020-08-01 RX ORDER — EVEROLIMUS 10 MG/1
0.5 TABLET ORAL ONCE
Refills: 0 | Status: COMPLETED | OUTPATIENT
Start: 2020-08-01 | End: 2020-08-01

## 2020-08-01 RX ORDER — ERYTHROPOIETIN 10000 [IU]/ML
10000 INJECTION, SOLUTION INTRAVENOUS; SUBCUTANEOUS ONCE
Refills: 0 | Status: COMPLETED | OUTPATIENT
Start: 2020-08-01 | End: 2020-08-01

## 2020-08-01 RX ADMIN — VALGANCICLOVIR 900 MILLIGRAM(S): 450 TABLET, FILM COATED ORAL at 11:48

## 2020-08-01 RX ADMIN — Medication 5 MILLIGRAM(S): at 05:51

## 2020-08-01 RX ADMIN — Medication 100 GRAM(S): at 07:46

## 2020-08-01 RX ADMIN — EVEROLIMUS 3 MILLIGRAM(S): 10 TABLET ORAL at 08:35

## 2020-08-01 RX ADMIN — EVEROLIMUS 0.5 MILLIGRAM(S): 10 TABLET ORAL at 11:09

## 2020-08-01 RX ADMIN — Medication 50 MILLIGRAM(S): at 22:04

## 2020-08-01 RX ADMIN — Medication 1: at 17:29

## 2020-08-01 RX ADMIN — MYCOPHENOLATE MOFETIL 750 MILLIGRAM(S): 250 CAPSULE ORAL at 08:46

## 2020-08-01 RX ADMIN — Medication 1 APPLICATION(S): at 11:45

## 2020-08-01 RX ADMIN — Medication 500000 UNIT(S): at 05:52

## 2020-08-01 RX ADMIN — Medication 500000 UNIT(S): at 17:18

## 2020-08-01 RX ADMIN — Medication 3: at 12:29

## 2020-08-01 RX ADMIN — EVEROLIMUS 3.5 MILLIGRAM(S): 10 TABLET ORAL at 22:02

## 2020-08-01 RX ADMIN — ERYTHROPOIETIN 10000 UNIT(S): 10000 INJECTION, SOLUTION INTRAVENOUS; SUBCUTANEOUS at 08:34

## 2020-08-01 RX ADMIN — Medication 500000 UNIT(S): at 11:44

## 2020-08-01 RX ADMIN — MYCOPHENOLATE MOFETIL 750 MILLIGRAM(S): 250 CAPSULE ORAL at 22:04

## 2020-08-01 RX ADMIN — TAMSULOSIN HYDROCHLORIDE 0.8 MILLIGRAM(S): 0.4 CAPSULE ORAL at 22:04

## 2020-08-01 RX ADMIN — NYSTATIN CREAM 1 APPLICATION(S): 100000 CREAM TOPICAL at 05:52

## 2020-08-01 RX ADMIN — Medication 81 MILLIGRAM(S): at 11:44

## 2020-08-01 RX ADMIN — Medication 1: at 08:35

## 2020-08-01 RX ADMIN — PANTOPRAZOLE SODIUM 40 MILLIGRAM(S): 20 TABLET, DELAYED RELEASE ORAL at 05:52

## 2020-08-01 RX ADMIN — Medication 1 TABLET(S): at 11:44

## 2020-08-01 RX ADMIN — Medication 500000 UNIT(S): at 22:05

## 2020-08-01 RX ADMIN — NYSTATIN CREAM 1 APPLICATION(S): 100000 CREAM TOPICAL at 17:19

## 2020-08-01 RX ADMIN — Medication 2: at 22:05

## 2020-08-01 RX ADMIN — MAGNESIUM OXIDE 400 MG ORAL TABLET 800 MILLIGRAM(S): 241.3 TABLET ORAL at 17:18

## 2020-08-01 RX ADMIN — MAGNESIUM OXIDE 400 MG ORAL TABLET 800 MILLIGRAM(S): 241.3 TABLET ORAL at 07:44

## 2020-08-01 NOTE — PROGRESS NOTE ADULT - ASSESSMENT
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 c/b VRE bactermia and delerium    [] POD 29 s/p OLT   - Good graft function, LFTs stable   - Immuno: Tacro DCd 7/19. Cyclosporine DCd 7/27.  Changed MTOR  inhibitor on 7/27 due to persistent delerium/AMS. Everolimus 2.5mg BID, MMF 750mg BID,  Pred 5mg daily  -Although mental status is improving patient remains altered. Will continue with Trazodone 25mg qhs. Haldol d/c'd. Will obtained head MRI today to evaluate persistent altered mental status.  - Daily Everolimus and Cyclosporine levels  - PPX: valcyte solution/nystatin/bactrim  -Continue IVF at 75cc/hr for dehydration  - Continue ASA 81mg daily  - h/o UGI bleed: Protonix 40mg daily   - Carb restricted diet as tolerated  - DVT PPx: SCDs at all times  - Sacral decub: seen by  wound care, recs appreciated, appears to be healing well  - Pain control PRN  -start calorie count  -will sent cholesterol panel w/ AM labs    [] VRE bacteremia (7/10)  - Daptomycin completed on 7/26   - Bld cx from 7/20 with NG, urine cx (7/20) with NG   - Appreciate ID recommendations    [] Hyperglycemia  - Appreciate endocrinology recommendations  - continue to monitor BG with Humalog insulin SSI coverage for now (was previously on Humalog 5U premeal and Lantus 8 units qhs)  - Fingersticks ac and qhs    [] Heel ulcer  - Continue local wound care w/ santyl and DSD as per podiatry    [] Dispo:   - continue 1:1 with tele 64M with IDDM, HLD, obesity, HFpEF with mild LV diastolic dysfunction, MGUS, chronic anemia with a history of duodenal ulcer as well as GAVE and duodenal AVM s/p APC (last on 10/11/19), 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 c/b VRE bactermia and delerium    [] POD 29 s/p OLT   - Good graft function, LFTs stable   - Immuno: Tacro DCd 7/19. Cyclosporine DCd 7/27.  Changed to MTOR  inhibitor on 7/27 due to persistent delerium/AMS.  - Everolimus level from 7/30 was 4.2.  Will give STAT 0.5mg now and increase to 3.5mg BID. Continue MMF 750mg BID,  Pred 5mg daily.   - Mental status improving, however he is still altered from baseline. Head CT negative.  Neuro consult evaluation pending.  - DC Trazadone. Start benadryl 50mg QHS  - Continue Rifaxamin 550mg BID.  Trend Ammonia levels  - Daily Everolimus and Cyclosporine levels  - PPX: valcyte solution/nystatin/bactrim  - Improved oral intake.  FU Calorie Count  DC IVF   - Carb restricted diet as tolerated  - Continue ASA 81mg daily  - h/o UGI bleed: Protonix 40mg daily.  Transfuse 1 unit PRBC today.  Epo 10,000U x1  - DVT PPx: SCDs at all times  - Sacral decub: seen by  wound care, recs appreciated, appears to be healing well  - Pain control PRN  - FU cholesterol panel    [] VRE bacteremia (7/10)  - Daptomycin completed on 7/26   - Bld cx from 7/20 with NG, urine cx (7/20) with NG   - Appreciate ID recommendations    [] Hyperglycemia  - Appreciate endocrinology recommendations  - continue to monitor BG with Humalog insulin SSI coverage for now (was previously on Humalog 5U premeal and Lantus 8 units qhs)  - Fingersticks ac and qhs    [] Heel ulcer  - Continue local wound care w/ santyl and DSD as per podiatry    [] Dispo:   - continue 1:1 with tele

## 2020-08-01 NOTE — PROGRESS NOTE ADULT - ASSESSMENT
64 M hx of DM, HLD, GERD, HFpEF with mild LV diastolic dysfunction, and decompensated JEAN cirrhosis c/b ascites with hx of SBP, HE, duodenal ulcer, GAVE and duodenal AVM s/p APC (last on 10/11/19), s/p liver transplant this admission (7/2/20). Post-op course c/b tacrolimus toxicity and hospital derlirium.      Impression:  #S/p liver transplant 7/2: liver enzymes continue to downtrend  OR details:  - L groin cutdown for vv bypass   - anastomosis: bicaval end-end/CBD duct-duct/HA & PV end-end, all standard anatomy.    - IOC with good flow  - Simulect induction. 500mg Solumedrol  - JPs x3 with T-Tube  - 14U pRBC, 14U FFP, 10 PLT, 11 CRYO, 500mL returned from cell saver, EBL 5L, UOP 1100mL  Recipient Info:   ABO: A  CMV:  Neg  EBV Positive  Donor:  Donor ID:  [id number] Match: [id number]  Age: 29 ABO:  A1 High Risk:   No COD: Cardiovascular  Anti CMV positive, EBV IgG- positive  HepBcAb-neg, Hepatitis C-DANA- neg, Hepatitis C ab-neg    #Halluciniations/agitation/AMS/tremors – due to CNI toxicity, improving now that pt is off CNI and on everolimus  #Sacral decubitus ulcer: stage I, does not appear infected  #GI bleed: Resolved. Hgb stable with no overt bleeding post-transplant. 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 GAVE and PHG likely improved pos ttransplant.  #R posterior heel wound w/ overlying eschar –Local wound care. no signs of infections, XR neg for gas, evaluated by podiatry and ID. MRI w/o contrast limited, but no overt signs of active infection.  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     Recommendation:  - continue with everolimus dosed per transplant surgery (2.5mg BID), f/u levels  - continue Cellcept (currently on 750 mg BID) - dosing as per transplant surgery  - continue prednisone 5 mg PO daily  - c/w trazodone 25mg daily  - frequent reorientation and visitation   - normalized sleep/wake cycle environment  - continue nystatin, Bactrim and Valcyte ppx  - physical therapy daily to get OOB and ambulating  - calorie count  - continue with mag oxide 200 mg BID  - monitor Hb on daily CBC - continue PO PPI BID, monitor bowel movements  - c/w ASA 81mg daily  - continue to hold diuretics  - follow up Wound care recommendations for R heel ulcer and sacral decub  - follow up other Transplant surgery recommendations 64 M hx of DM, HLD, GERD, HFpEF with mild LV diastolic dysfunction, and decompensated JEAN cirrhosis c/b ascites with hx of SBP, HE, duodenal ulcer, GAVE and duodenal AVM s/p APC (last on 10/11/19), s/p liver transplant this admission (7/2/20). Post-op course c/b tacrolimus toxicity and hospital derlirium.      Impression:  #S/p liver transplant 7/2: liver enzymes continue to downtrend  OR details:  - L groin cutdown for vv bypass   - anastomosis: bicaval end-end/CBD duct-duct/HA & PV end-end, all standard anatomy.    - IOC with good flow  - Simulect induction. 500mg Solumedrol  - JPs x3 with T-Tube  - 14U pRBC, 14U FFP, 10 PLT, 11 CRYO, 500mL returned from cell saver, EBL 5L, UOP 1100mL  Recipient Info:   ABO: A  CMV:  Neg  EBV Positive  Donor:  Donor ID:  [id number] Match: [id number]  Age: 29 ABO:  A1 High Risk:   No COD: Cardiovascular  Anti CMV positive, EBV IgG- positive  HepBcAb-neg, Hepatitis C-DANA- neg, Hepatitis C ab-neg    #Halluciniations/agitation/AMS/tremors – due to CNI toxicity, improving now that pt is off CNI and on everolimus  #Sacral decubitus ulcer: stage I, does not appear infected  #GI bleed: Resolved. Hgb stable with no overt bleeding post-transplant. 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 GAVE and PHG likely improved pos ttransplant.  #R posterior heel wound w/ overlying eschar –Local wound care. no signs of infections, XR neg for gas, evaluated by podiatry and ID. MRI w/o contrast limited, but no overt signs of active infection.  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     Recommendation:  - continue with everolimus dosed per transplant surgery, f/u levels (3mg BID)  - continue Cellcept dosing as per transplant surgery  - continue prednisone 5 mg PO daily  - c/w trazodone 50mg daily  - frequent reorientation and visitation   - normalized sleep/wake cycle environment  - continue nystatin, Bactrim and Valcyte ppx  - physical therapy daily to get OOB and ambulating  - calorie count  - continue with mag oxide 200 mg BID  - monitor Hb on daily CBC - continue PO PPI BID, monitor bowel movements  - c/w ASA 81mg daily  - continue to hold diuretics  - follow up Wound care recommendations for R heel ulcer and sacral decub  - follow up other Transplant surgery recommendations

## 2020-08-01 NOTE — PROGRESS NOTE ADULT - SUBJECTIVE AND OBJECTIVE BOX
[facility]      Date:  7/2/2020       POD#30    Present: Patient seen with multidisciplinary team including Transplant Surgeon Dr. Alonzo, Nephrologist Dr. Rowland and NP Xiao Hurley and unit RN during am rounds and examined by  Dr. Alonzo. Disciplines not in attendance will be notified of the plan.    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     Re-admitted on 6/11:   ·	worsening HE  ·	UTI/VRE: tx with linezolid 6/15-22, bactrim DS 6/22 - 7/2  ·	MISA/Hyponatremia  ·	UGI bleed (melena) s/p EGD (6/22) c/w hemorrhagic duodenitis/gastritis; Grade 2 EV; requiring multiple transfusions  ·	Known h/o R foot ulcer (MRI neg for OM)  ·	s/p OLTx on 7/2/2020, post op liver doppler with patent vessels  ·	post op course c/b delirium and VRE bacteremia (7/10)   ·	7/31 Head CT negative. Neuro consulted    Interval events:  - good graft function;  LFTs stable  - Head CT negative for acute pathology  - NH3 level 63.   Stat Rifaxamin 550mg x1 given then BID dosing started  - Neuro consulted   - Minimal agitation overnight. + restessness.   Slept for few hours with BIPAP  -Trazodone 25mg x1 @hs then discontinued and changed to Benadryl 50mg qhs  -   Potential Discharge date: pending clinical improvement     Education:  Medications    Plan of care:  See Below      MEDICATIONS  (STANDING):  aspirin enteric coated 81 milliGRAM(s) Oral daily  chlorhexidine 2% Cloths 1 Application(s) Topical <User Schedule>  collagenase Ointment 1 Application(s) Topical daily  everolimus (ZORTRESS) 3 milliGRAM(s) Oral <User Schedule>  insulin lispro (HumaLOG) corrective regimen sliding scale   SubCutaneous Before meals and at bedtime  magnesium oxide 800 milliGRAM(s) Oral two times a day with meals  mycophenolate mofetil 750 milliGRAM(s) Oral <User Schedule>  nystatin    Suspension 782367 Unit(s) Oral four times a day  nystatin Cream 1 Application(s) Topical two times a day  pantoprazole    Tablet 40 milliGRAM(s) Oral before breakfast  predniSONE   Tablet 5 milliGRAM(s) Oral daily  sodium chloride 0.9%. 1000 milliLiter(s) (75 mL/Hr) IV Continuous <Continuous>  tamsulosin 0.8 milliGRAM(s) Oral at bedtime  traZODone 25 milliGRAM(s) Oral at bedtime  trimethoprim   80 mG/sulfamethoxazole 400 mG 1 Tablet(s) Oral daily  valGANciclovir 50 mG/mL Oral Solution 900 milliGRAM(s) Oral daily        PAST MEDICAL & SURGICAL HISTORY:  GIB (gastrointestinal bleeding)  GERD with esophagitis: Gastritis &amp; Non Bleeding Ulcers  Hepatic encephalopathy  Obesity  Fatty liver disease, nonalcoholic  Renal stones: 25 years ago  Hypertension  Neuropathy  Hypercholesteremia  Diabetes  S/P cholecystectomy      MEDICATIONS  (STANDING):  aspirin enteric coated 81 milliGRAM(s) Oral daily  chlorhexidine 2% Cloths 1 Application(s) Topical <User Schedule>  collagenase Ointment 1 Application(s) Topical daily  everolimus (ZORTRESS) 3 milliGRAM(s) Oral <User Schedule>  insulin lispro (HumaLOG) corrective regimen sliding scale   SubCutaneous Before meals and at bedtime  magnesium oxide 800 milliGRAM(s) Oral two times a day with meals  mycophenolate mofetil 750 milliGRAM(s) Oral <User Schedule>  nystatin    Suspension 500426 Unit(s) Oral four times a day  nystatin Cream 1 Application(s) Topical two times a day  pantoprazole    Tablet 40 milliGRAM(s) Oral before breakfast  predniSONE   Tablet 5 milliGRAM(s) Oral daily  sodium chloride 0.9%. 1000 milliLiter(s) (75 mL/Hr) IV Continuous <Continuous>  tamsulosin 0.8 milliGRAM(s) Oral at bedtime  traZODone 25 milliGRAM(s) Oral at bedtime  trimethoprim   80 mG/sulfamethoxazole 400 mG 1 Tablet(s) Oral daily  valGANciclovir 50 mG/mL Oral Solution 900 milliGRAM(s) Oral daily    MEDICATIONS  (PRN):      PAST MEDICAL & SURGICAL HISTORY:  GIB (gastrointestinal bleeding)  GERD with esophagitis: Gastritis &amp; Non Bleeding Ulcers  Hepatic encephalopathy  Obesity  Fatty liver disease, nonalcoholic  Renal stones: 25 years ago  Hypertension  Neuropathy  Hypercholesteremia  Diabetes  S/P cholecystectomy      Vital Signs Last 24 Hrs  T(C): 37.2 (31 Jul 2020 09:00), Max: 37.2 (31 Jul 2020 09:00)  T(F): 99 (31 Jul 2020 09:00), Max: 99 (31 Jul 2020 09:00)  HR: 89 (31 Jul 2020 10:01) (78 - 94)  BP: 135/83 (31 Jul 2020 09:00) (123/66 - 145/75)  BP(mean): --  RR: 20 (31 Jul 2020 10:01) (18 - 20)  SpO2: 98% (31 Jul 2020 10:01) (97% - 100%)    I&O's Summary    30 Jul 2020 07:01  -  31 Jul 2020 07:00  --------------------------------------------------------  IN: 1660 mL / OUT: 1235 mL / NET: 425 mL                              8.4    3.42  )-----------( 125      ( 31 Jul 2020 06:50 )             25.0     07-31    141  |  107  |  36<H>  ----------------------------<  141<H>  4.7   |  19<L>  |  1.25    Ca    9.0      31 Jul 2020 06:49  Phos  3.0     07-31  Mg     1.7     07-31    TPro  5.4<L>  /  Alb  3.2<L>  /  TBili  0.5  /  DBili  0.2  /  AST  26  /  ALT  29  /  AlkPhos  104  07-31        ROS  Unable to obtain due to AMS    Physical Exam  Constitutional: more awake and alert, calm, cooperative, confused speech  Eyes: Anicteric, PERRLA  ENMT: nc/at  Neck: no central line, no JVD  Respiratory: CTA B/L, unlabored breathing   Cardiovascular: RRR  Gastrointestinal: soft, NT, ND, incision c/d/i, T-tube tied   Genitourinary: Voiding spontaneously  Extremities: heel wound without drainage, edema improving   Vascular: Palpable dp pulses bilaterally  Neurological: Alert, oriented x2-3 today, less tremors, TORREZ  Skin: no rashes  Musculoskeletal: Moving all extremities  Psychiatric: Responsive OLTx      Date:  7/2/2020       POD#30    Present: Patient seen with multidisciplinary team including Transplant Surgeon Dr. Alonzo, Nephrologist Dr. Rowland and NP Xiao Hurley and unit RN during am rounds and examined by  Dr. Alonzo. Disciplines not in attendance will be notified of the plan.    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     Re-admitted on 6/11:   ·	worsening HE  ·	UTI/VRE: tx with linezolid 6/15-22, bactrim DS 6/22 - 7/2  ·	MISA/Hyponatremia  ·	UGI bleed (melena) s/p EGD (6/22) c/w hemorrhagic duodenitis/gastritis; Grade 2 EV; requiring multiple transfusions  ·	Known h/o R foot ulcer (MRI neg for OM)  ·	s/p OLTx on 7/2/2020, post op liver doppler with patent vessels  ·	post op course c/b delirium and VRE bacteremia (7/10)   ·	7/31 Head CT negative. Neuro consulted    Interval events:  - good graft function;  LFTs stable  - Head CT negative for acute pathology  - Ammonia level 63.   Stat Rifaxamin 550mg x1 given then BID dosing started.  AM Ammonia level 40  - Neuro consulted   - Minimal agitation overnight. + restlessness.   Slept for few hours with BIPAP.  Less tremulous this am  -Trazodone 25mg x1 @hs last night then discontinued and changed to Benadryl 50mg qhs to start tonight  - Improved oral intake  - SCR 1.37 from 1.30  UOP 2L plus incontinence   - Slight downtrend in H/H  8.0/24.2 from 8.4/25.0  - Everolimus level from 7/29 was 5.5.  Received STAT 0.5mg yesterday and increased to 3mg BID      Potential Discharge date: pending clinical improvement     Education:  Medications    Plan of care:  See Below      MEDICATIONS  (STANDING):  aspirin enteric coated 81 milliGRAM(s) Oral daily  chlorhexidine 2% Cloths 1 Application(s) Topical <User Schedule>  collagenase Ointment 1 Application(s) Topical daily  diphenhydrAMINE 50 milliGRAM(s) Oral at bedtime  everolimus (ZORTRESS) 3 milliGRAM(s) Oral <User Schedule>  insulin lispro (HumaLOG) corrective regimen sliding scale   SubCutaneous Before meals and at bedtime  magnesium oxide 800 milliGRAM(s) Oral two times a day with meals  mycophenolate mofetil 750 milliGRAM(s) Oral <User Schedule>  nystatin    Suspension [id number] Unit(s) Oral four times a day  nystatin Cream 1 Application(s) Topical two times a day  pantoprazole    Tablet 40 milliGRAM(s) Oral before breakfast  predniSONE   Tablet 5 milliGRAM(s) Oral daily  sodium chloride 0.9%. 1000 milliLiter(s) (75 mL/Hr) IV Continuous <Continuous>  tamsulosin 0.8 milliGRAM(s) Oral at bedtime  trimethoprim   80 mG/sulfamethoxazole 400 mG 1 Tablet(s) Oral daily  valGANciclovir 50 mG/mL Oral Solution 900 milliGRAM(s) Oral daily    MEDICATIONS  (PRN):      PAST MEDICAL & SURGICAL HISTORY:  GIB (gastrointestinal bleeding)  GERD with esophagitis: Gastritis &amp; Non Bleeding Ulcers  Hepatic encephalopathy  Obesity  Fatty liver disease, nonalcoholic  Renal stones: 25 years ago  Hypertension  Neuropathy  Hypercholesteremia  Diabetes  S/P cholecystectomy      Vital Signs Last 24 Hrs  T(C): 36.7 (01 Aug 2020 05:47), Max: 36.8 (31 Jul 2020 21:13)  T(F): 98 (01 Aug 2020 05:47), Max: 98.3 (31 Jul 2020 21:13)  HR: 81 (01 Aug 2020 06:10) (79 - 97)  BP: 125/61 (01 Aug 2020 05:47) (122/58 - 126/60)  BP(mean): --  RR: 18 (01 Aug 2020 05:47) (18 - 20)  SpO2: 100% (01 Aug 2020 06:10) (95% - 100%)    I&O's Summary    31 Jul 2020 07:01  -  01 Aug 2020 07:00  --------------------------------------------------------  IN: 0 mL / OUT: 2000 mL / NET: -2000 mL                              8.0    3.53  )-----------( 121      ( 01 Aug 2020 05:36 )             24.2     08-01    140  |  106  |  37<H>  ----------------------------<  172<H>  4.7   |  22  |  1.37<H>    Ca    9.2      01 Aug 2020 05:36  Phos  3.1     08-01  Mg     1.9     08-01    TPro  5.5<L>  /  Alb  3.3  /  TBili  0.5  /  DBili  0.2  /  AST  24  /  ALT  30  /  AlkPhos  116  08-01      ROS  Unable to obtain due to AMS    Physical Exam  Constitutional: more awake and alert, calm, cooperative, confused speech  Eyes: Anicteric, PERRLA  ENMT: nc/at  Neck: no central line, no JVD  Respiratory: CTA B/L, unlabored breathing   Cardiovascular: RRR  Gastrointestinal: soft, NT, ND, incision c/d/i, T-tube tied   Genitourinary: Voiding spontaneously  Extremities: heel wound without drainage, edema improving   Vascular: Palpable dp pulses bilaterally  Neurological: Alert, oriented x2-3 today, less tremors, TORREZ  Skin: no rashes  Musculoskeletal: Moving all extremities  Psychiatric: Responsive OLTx      Date:  7/2/2020       POD#30    Present: Patient seen with multidisciplinary team including Transplant Surgeon Dr. Alonzo, Nephrologist Dr. Rowland and NP Xiao Hurley and unit RN during am rounds and examined by  Dr. Alonzo. Disciplines not in attendance will be notified of the plan.    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     Re-admitted on 6/11:   ·	worsening HE  ·	UTI/VRE: tx with linezolid 6/15-22, bactrim DS 6/22 - 7/2  ·	MISA/Hyponatremia  ·	UGI bleed (melena) s/p EGD (6/22) c/w hemorrhagic duodenitis/gastritis; Grade 2 EV; requiring multiple transfusions  ·	Known h/o R foot ulcer (MRI neg for OM)  ·	s/p OLTx on 7/2/2020, post op liver doppler with patent vessels  ·	post op course c/b delirium and VRE bacteremia (7/10)   ·	7/31 Head CT negative.  Started on Rifaxamin for Ammonia level 63. Neuro consulted    Interval events:  - good graft function;  LFTs stable  - Head CT negative for acute pathology  - Ammonia level 63.   Stat Rifaxamin 550mg x1 given then BID dosing started.  AM Ammonia level 40  - Neuro consulted   - Minimal agitation overnight. + restlessness.   Slept for few hours with BIPAP.  Less tremulous this am  -Trazodone 25mg x1 last night then discontinued and changed to Benadryl 50mg qhs to start tonight  - Improved oral intake  - SCR 1.37 from 1.30  UOP 2L plus incontinence   - Slight downtrend in H/H  8.0/24.2 from 8.4/25.0  - Everolimus level from 7/29 was 5.5.  Received STAT 0.5mg yesterday and increased to 3mg BID  - Cyclosporin level downtrending 38 from 64      Potential Discharge date: pending clinical improvement     Education:  Medications    Plan of care:  See Below      MEDICATIONS  (STANDING):  aspirin enteric coated 81 milliGRAM(s) Oral daily  chlorhexidine 2% Cloths 1 Application(s) Topical <User Schedule>  collagenase Ointment 1 Application(s) Topical daily  diphenhydrAMINE 50 milliGRAM(s) Oral at bedtime  everolimus (ZORTRESS) 3 milliGRAM(s) Oral <User Schedule>  insulin lispro (HumaLOG) corrective regimen sliding scale   SubCutaneous Before meals and at bedtime  magnesium oxide 800 milliGRAM(s) Oral two times a day with meals  mycophenolate mofetil 750 milliGRAM(s) Oral <User Schedule>  nystatin    Suspension [id number] Unit(s) Oral four times a day  nystatin Cream 1 Application(s) Topical two times a day  pantoprazole    Tablet 40 milliGRAM(s) Oral before breakfast  predniSONE   Tablet 5 milliGRAM(s) Oral daily  sodium chloride 0.9%. 1000 milliLiter(s) (75 mL/Hr) IV Continuous <Continuous>  tamsulosin 0.8 milliGRAM(s) Oral at bedtime  trimethoprim   80 mG/sulfamethoxazole 400 mG 1 Tablet(s) Oral daily  valGANciclovir 50 mG/mL Oral Solution 900 milliGRAM(s) Oral daily    MEDICATIONS  (PRN):      PAST MEDICAL & SURGICAL HISTORY:  GIB (gastrointestinal bleeding)  GERD with esophagitis: Gastritis &amp; Non Bleeding Ulcers  Hepatic encephalopathy  Obesity  Fatty liver disease, nonalcoholic  Renal stones: 25 years ago  Hypertension  Neuropathy  Hypercholesteremia  Diabetes  S/P cholecystectomy      Vital Signs Last 24 Hrs  T(C): 36.7 (01 Aug 2020 05:47), Max: 36.8 (31 Jul 2020 21:13)  T(F): 98 (01 Aug 2020 05:47), Max: 98.3 (31 Jul 2020 21:13)  HR: 81 (01 Aug 2020 06:10) (79 - 97)  BP: 125/61 (01 Aug 2020 05:47) (122/58 - 126/60)  BP(mean): --  RR: 18 (01 Aug 2020 05:47) (18 - 20)  SpO2: 100% (01 Aug 2020 06:10) (95% - 100%)    I&O's Summary    31 Jul 2020 07:01  -  01 Aug 2020 07:00  --------------------------------------------------------  IN: 0 mL / OUT: 2000 mL / NET: -2000 mL                              8.0    3.53  )-----------( 121      ( 01 Aug 2020 05:36 )             24.2     08-01    140  |  106  |  37<H>  ----------------------------<  172<H>  4.7   |  22  |  1.37<H>    Ca    9.2      01 Aug 2020 05:36  Phos  3.1     08-01  Mg     1.9     08-01    TPro  5.5<L>  /  Alb  3.3  /  TBili  0.5  /  DBili  0.2  /  AST  24  /  ALT  30  /  AlkPhos  116  08-01      ROS  Unable to obtain due to AMS    Physical Exam  Constitutional: more awake and alert, calm, cooperative, confused speech  Eyes: Anicteric, PERRLA  ENMT: nc/at  Neck: no JVD  Respiratory: CTA B/L, unlabored breathing   Cardiovascular: RRR  Gastrointestinal: soft, NT, ND, incision c/d/i, T-tube tied   Genitourinary: Voiding spontaneously  Extremities: heel wound without drainage, No edema   Vascular: Palpable dp pulses bilaterally  Neurological: Alert, oriented x2 today, less tremors, TORREZ  Skin: no rashes  Musculoskeletal: Moving all extremities  Psychiatric: Responsive

## 2020-08-01 NOTE — PROGRESS NOTE ADULT - ATTENDING COMMENTS
Hepatology Staff: Yomi Medina MD    I saw and examined the patient along with Dr. Baker 08-01-20 @ 08:18.    Patient Medical Record, hosptial course was reviewed and summarized as below:  Hemodynamic Status:  Vitals: Vital Signs Last 24 Hrs  T(C): 36.7 (01 Aug 2020 05:47), Max: 37.2 (31 Jul 2020 09:00)  T(F): 98 (01 Aug 2020 05:47), Max: 99 (31 Jul 2020 09:00)  HR: 81 (01 Aug 2020 06:10) (79 - 97)  BP: 125/61 (01 Aug 2020 05:47) (122/58 - 135/83)    RR: 18 (01 Aug 2020 05:47) (18 - 20)  SpO2: 100% (01 Aug 2020 06:10) (95% - 100%)    Immunosuppresion:  Antibiotics:rifAXIMin 550 milliGRAM(s) Oral two times a day    I/O: I&O's Summary    31 Jul 2020 07:01  -  01 Aug 2020 07:00  --------------------------------------------------------  IN: 0 mL / OUT: 2000 mL / NET: -2000 mL      Nutritional Status: Albumin, Serum: 3.3 g/dL (08-01-20 @ 05:36)  Albumin, Serum: 3.3 g/dL (07-31-20 @ 19:47)    Recommendations: Findings on the CT head was noted ( some atrophic changes was noted- does not explain his mental status changes) Patient's overall mental status better off CNIs. Keep on Everolimus 3 mg PO bid, CellCept 750 mg PO bid, prednisone 5 mg daily. Noted Trazadone was d/karlee by surgical team.  Cont with OT/PT.  Agree with the rest.     Plan discussed with Primary team.

## 2020-08-01 NOTE — PROGRESS NOTE ADULT - SUBJECTIVE AND OBJECTIVE BOX
Chief Complaint:  Patient is a 64y old  Male who presents with a chief complaint of Liver failure, hepatic encephalopathy (2020 19:06)      Interval Events: Resting comfortably, no distress.   ROS: All 12 point system except listed above were otherwise negative.    Allergies:  codeine (Anaphylaxis)        Hospital Medications:  aspirin enteric coated 81 milliGRAM(s) Oral daily  chlorhexidine 2% Cloths 1 Application(s) Topical <User Schedule>  collagenase Ointment 1 Application(s) Topical daily  diphenhydrAMINE 50 milliGRAM(s) Oral at bedtime  everolimus (ZORTRESS) 3 milliGRAM(s) Oral <User Schedule>  insulin lispro (HumaLOG) corrective regimen sliding scale   SubCutaneous Before meals and at bedtime  magnesium oxide 800 milliGRAM(s) Oral two times a day with meals  magnesium sulfate  IVPB 1 Gram(s) IV Intermittent once  mycophenolate mofetil 750 milliGRAM(s) Oral <User Schedule>  nystatin    Suspension 733467 Unit(s) Oral four times a day  nystatin Cream 1 Application(s) Topical two times a day  pantoprazole    Tablet 40 milliGRAM(s) Oral before breakfast  predniSONE   Tablet 5 milliGRAM(s) Oral daily  rifAXIMin 550 milliGRAM(s) Oral two times a day  sodium chloride 0.9%. 1000 milliLiter(s) IV Continuous <Continuous>  tamsulosin 0.8 milliGRAM(s) Oral at bedtime  trimethoprim   80 mG/sulfamethoxazole 400 mG 1 Tablet(s) Oral daily  valGANciclovir 50 mG/mL Oral Solution 900 milliGRAM(s) Oral daily      PMHX/PSHX:  GIB (gastrointestinal bleeding)  GERD with esophagitis  Hepatic encephalopathy  Obesity  Fatty liver disease, nonalcoholic  Renal stones  Hypertension  Neuropathy  Hypercholesteremia  Diabetes  S/P cholecystectomy  No significant past surgical history      Family history:  Family history of type 2 diabetes mellitus  Family history of hypertension  Family history of stomach cancer  No pertinent family history in first degree relatives    There is no family history of peptic ulcer disease, gastric cancer, colon polyps, colon cancer, celiac disease, biliary, hepatic, or pancreatic disease.  None of the female relatives have breast, uterine, or ovarian cancer.     PHYSICAL EXAM:   Vital Signs:  Vital Signs Last 24 Hrs  T(C): 36.7 (01 Aug 2020 05:47), Max: 37.2 (2020 09:00)  T(F): 98 (01 Aug 2020 05:47), Max: 99 (2020 09:00)  HR: 81 (01 Aug 2020 06:10) (79 - 97)  BP: 125/61 (01 Aug 2020 05:47) (122/58 - 135/83)  BP(mean): --  RR: 18 (01 Aug 2020 05:47) (18 - 20)  SpO2: 100% (01 Aug 2020 06:10) (95% - 100%)  Daily     Daily     GENERAL: no acute distress  NEURO: alert, confused, no asterixis, minimal tremors  HEENT: anicteric sclera, no conjunctival pallor appreciated  CHEST: no respiratory distress, no accessory muscle use  CARDIAC: regular rate, rhythm  ABDOMEN: soft, non-tender, non-distended, no rebound or guarding  EXTREMITIES: warm, well perfused, no edema  SKIN: no lesions noted  LABS:                        8.0    3.53  )-----------( 121      ( 01 Aug 2020 05:36 )             24.2     Mean Cell Volume: 96.4 fl (20 @ 05:36)    08    140  |  106  |  37<H>  ----------------------------<  172<H>  4.7   |  22  |  1.37<H>    Ca    9.2      01 Aug 2020 05:36  Phos  3.1       Mg     1.9         TPro  5.5<L>  /  Alb  3.3  /  TBili  0.5  /  DBili  0.2  /  AST  24  /  ALT  30  /  AlkPhos  116  08    LIVER FUNCTIONS - ( 01 Aug 2020 05:36 )  Alb: 3.3 g/dL / Pro: 5.5 g/dL / ALK PHOS: 116 U/L / ALT: 30 U/L / AST: 24 U/L / GGT: x           PT/INR - ( 01 Aug 2020 05:36 )   PT: 12.3 sec;   INR: 1.04 ratio         PTT - ( 01 Aug 2020 05:36 )  PTT:28.6 sec  Urinalysis Basic - ( 2020 18:15 )    Color: Light Yellow / Appearance: Clear / S.022 / pH: x  Gluc: x / Ketone: Negative  / Bili: Negative / Urobili: Negative   Blood: x / Protein: Trace / Nitrite: Negative   Leuk Esterase: Negative / RBC: 4 /hpf / WBC 11 /HPF   Sq Epi: x / Non Sq Epi: 0 /hpf / Bacteria: Negative      Amylase Serum--      Lipase serum--       [id number]  Amylase Serum--      Lipase serum--       [id number]                          8.0    3.53  )-----------( 121      ( 01 Aug 2020 05:36 )             24.2                         8.4    3.42  )-----------( 125      ( 2020 06:50 )             25.0                         8.8    4.38  )-----------( 128      ( 2020 06:26 )             26.2     Imaging:

## 2020-08-01 NOTE — PROGRESS NOTE ADULT - SUBJECTIVE AND OBJECTIVE BOX
INTERVAL HPI/OVERNIGHT EVENTS:    patient seen and examined   resting in bed  chart reviewed      MEDICATIONS  (STANDING):  aspirin enteric coated 81 milliGRAM(s) Oral daily  chlorhexidine 2% Cloths 1 Application(s) Topical <User Schedule>  collagenase Ointment 1 Application(s) Topical daily  furosemide    Tablet 80 milliGRAM(s) Oral every 8 hours  insulin glargine Injectable (LANTUS) 16 Unit(s) SubCutaneous at bedtime  insulin lispro (HumaLOG) corrective regimen sliding scale   SubCutaneous three times a day before meals  insulin lispro (HumaLOG) corrective regimen sliding scale   SubCutaneous at bedtime  insulin lispro Injectable (HumaLOG) 10 Unit(s) SubCutaneous before dinner  insulin lispro Injectable (HumaLOG) 8 Unit(s) SubCutaneous before breakfast  insulin lispro Injectable (HumaLOG) 8 Unit(s) SubCutaneous before lunch  linezolid  IVPB 600 milliGRAM(s) IV Intermittent every 12 hours  magnesium oxide 800 milliGRAM(s) Oral two times a day with meals  meropenem  IVPB 1000 milliGRAM(s) IV Intermittent every 8 hours  nystatin    Suspension 537671 Unit(s) Swish and Swallow four times a day  nystatin Cream 1 Application(s) Topical two times a day  pantoprazole  Injectable 40 milliGRAM(s) IV Push every 12 hours  polyethylene glycol 3350 17 Gram(s) Oral daily  predniSONE   Tablet 5 milliGRAM(s) Oral daily  senna 2 Tablet(s) Oral at bedtime  tacrolimus 3 milliGRAM(s) Oral <User Schedule>  tacrolimus 2 milliGRAM(s) Oral <User Schedule>  tamsulosin 0.8 milliGRAM(s) Oral at bedtime  trimethoprim   80 mG/sulfamethoxazole 400 mG 1 Tablet(s) Oral daily  valGANciclovir 450 milliGRAM(s) Oral daily    MEDICATIONS  (PRN):  glucagon  Injectable 1 milliGRAM(s) IntraMuscular once PRN Glucose <70 milliGRAM(s)/deciLiter      Allergies    codeine (Anaphylaxis)    Intolerances        Review of Systems: unable to obtain           Vital Signs Last 24 Hrs  T(C): 36.3 (12 Jul 2020 15:00), Max: 36.8 (11 Jul 2020 19:00)  T(F): 97.3 (12 Jul 2020 15:00), Max: 98.2 (11 Jul 2020 19:00)  HR: 80 (12 Jul 2020 15:00) (72 - 83)  BP: 109/59 (12 Jul 2020 15:00) (94/55 - 154/68)  BP(mean): 78 (12 Jul 2020 15:00) (70 - 125)  RR: 15 (12 Jul 2020 15:00) (9 - 28)  SpO2: 96% (12 Jul 2020 15:00) (96% - 100%)    PHYSICAL EXAM:    Constitutional: NAD   HEENT: sclera anicteric   Neck: No LAD, supple  Gastrointestinal: BS+, soft, NT, ND, incisions c/d/i  Extremities: +b/l peripheral edema  Neurological: confused, less tremulous     LABS:                        9.3    5.51  )-----------( 98       ( 12 Jul 2020 01:54 )             28.6     07-12    131<L>  |  95<L>  |  31<H>  ----------------------------<  217<H>  5.0   |  27  |  1.26    Ca    8.1<L>      12 Jul 2020 12:22  Phos  4.0     07-12  Mg     2.0     07-12    TPro  4.4<L>  /  Alb  2.6<L>  /  TBili  1.1  /  DBili  0.5<H>  /  AST  64<H>  /  ALT  98<H>  /  AlkPhos  212<H>  07-12    PT/INR - ( 12 Jul 2020 01:54 )   PT: 12.7 sec;   INR: 1.12 ratio         PTT - ( 12 Jul 2020 01:54 )  PTT:28.7 sec      RADIOLOGY & ADDITIONAL TESTS:

## 2020-08-02 LAB
ALBUMIN SERPL ELPH-MCNC: 3.2 G/DL — LOW (ref 3.3–5)
ALP SERPL-CCNC: 102 U/L — SIGNIFICANT CHANGE UP (ref 40–120)
ALT FLD-CCNC: 23 U/L — SIGNIFICANT CHANGE UP (ref 10–45)
AMMONIA BLD-MCNC: 51 UMOL/L — SIGNIFICANT CHANGE UP (ref 11–55)
ANION GAP SERPL CALC-SCNC: 12 MMOL/L — SIGNIFICANT CHANGE UP (ref 5–17)
APTT BLD: 29.2 SEC — SIGNIFICANT CHANGE UP (ref 27.5–35.5)
AST SERPL-CCNC: 15 U/L — SIGNIFICANT CHANGE UP (ref 10–40)
BILIRUB DIRECT SERPL-MCNC: 0.2 MG/DL — SIGNIFICANT CHANGE UP (ref 0–0.2)
BILIRUB INDIRECT FLD-MCNC: 0.4 MG/DL — SIGNIFICANT CHANGE UP (ref 0.2–1)
BILIRUB SERPL-MCNC: 0.6 MG/DL — SIGNIFICANT CHANGE UP (ref 0.2–1.2)
BUN SERPL-MCNC: 31 MG/DL — HIGH (ref 7–23)
CALCIUM SERPL-MCNC: 9.1 MG/DL — SIGNIFICANT CHANGE UP (ref 8.4–10.5)
CHLORIDE SERPL-SCNC: 106 MMOL/L — SIGNIFICANT CHANGE UP (ref 96–108)
CO2 SERPL-SCNC: 21 MMOL/L — LOW (ref 22–31)
CREAT SERPL-MCNC: 1.09 MG/DL — SIGNIFICANT CHANGE UP (ref 0.5–1.3)
CYCLOSPORINE SER-MCNC: <30 NG/ML — LOW (ref 150–400)
GLUCOSE BLDC GLUCOMTR-MCNC: 150 MG/DL — HIGH (ref 70–99)
GLUCOSE BLDC GLUCOMTR-MCNC: 153 MG/DL — HIGH (ref 70–99)
GLUCOSE BLDC GLUCOMTR-MCNC: 179 MG/DL — HIGH (ref 70–99)
GLUCOSE BLDC GLUCOMTR-MCNC: 207 MG/DL — HIGH (ref 70–99)
GLUCOSE BLDC GLUCOMTR-MCNC: 229 MG/DL — HIGH (ref 70–99)
GLUCOSE BLDC GLUCOMTR-MCNC: 240 MG/DL — HIGH (ref 70–99)
GLUCOSE SERPL-MCNC: 162 MG/DL — HIGH (ref 70–99)
HCT VFR BLD CALC: 25.8 % — LOW (ref 39–50)
HGB BLD-MCNC: 8.6 G/DL — LOW (ref 13–17)
INR BLD: 1.04 RATIO — SIGNIFICANT CHANGE UP (ref 0.88–1.16)
MAGNESIUM SERPL-MCNC: 1.7 MG/DL — SIGNIFICANT CHANGE UP (ref 1.6–2.6)
MCHC RBC-ENTMCNC: 32.2 PG — SIGNIFICANT CHANGE UP (ref 27–34)
MCHC RBC-ENTMCNC: 33.3 GM/DL — SIGNIFICANT CHANGE UP (ref 32–36)
MCV RBC AUTO: 96.6 FL — SIGNIFICANT CHANGE UP (ref 80–100)
NRBC # BLD: 0 /100 WBCS — SIGNIFICANT CHANGE UP (ref 0–0)
PHOSPHATE SERPL-MCNC: 3.2 MG/DL — SIGNIFICANT CHANGE UP (ref 2.5–4.5)
PLATELET # BLD AUTO: 114 K/UL — LOW (ref 150–400)
POTASSIUM SERPL-MCNC: 4.7 MMOL/L — SIGNIFICANT CHANGE UP (ref 3.5–5.3)
POTASSIUM SERPL-SCNC: 4.7 MMOL/L — SIGNIFICANT CHANGE UP (ref 3.5–5.3)
PROCALCITONIN SERPL-MCNC: 0.17 NG/ML — HIGH (ref 0.02–0.1)
PROT SERPL-MCNC: 5.3 G/DL — LOW (ref 6–8.3)
PROTHROM AB SERPL-ACNC: 12.4 SEC — SIGNIFICANT CHANGE UP (ref 10.6–13.6)
RBC # BLD: 2.67 M/UL — LOW (ref 4.2–5.8)
RBC # FLD: 17.9 % — HIGH (ref 10.3–14.5)
SODIUM SERPL-SCNC: 139 MMOL/L — SIGNIFICANT CHANGE UP (ref 135–145)
WBC # BLD: 3.18 K/UL — LOW (ref 3.8–10.5)
WBC # FLD AUTO: 3.18 K/UL — LOW (ref 3.8–10.5)

## 2020-08-02 PROCEDURE — 99232 SBSQ HOSP IP/OBS MODERATE 35: CPT | Mod: GC,24

## 2020-08-02 PROCEDURE — 99232 SBSQ HOSP IP/OBS MODERATE 35: CPT

## 2020-08-02 RX ORDER — DIPHENHYDRAMINE HCL 50 MG
50 CAPSULE ORAL
Refills: 0 | Status: DISCONTINUED | OUTPATIENT
Start: 2020-08-02 | End: 2020-08-08

## 2020-08-02 RX ADMIN — Medication 1: at 21:57

## 2020-08-02 RX ADMIN — MYCOPHENOLATE MOFETIL 750 MILLIGRAM(S): 250 CAPSULE ORAL at 07:45

## 2020-08-02 RX ADMIN — Medication 500000 UNIT(S): at 17:26

## 2020-08-02 RX ADMIN — Medication 2: at 13:11

## 2020-08-02 RX ADMIN — Medication 5 MILLIGRAM(S): at 06:07

## 2020-08-02 RX ADMIN — Medication 1 TABLET(S): at 11:07

## 2020-08-02 RX ADMIN — NYSTATIN CREAM 1 APPLICATION(S): 100000 CREAM TOPICAL at 06:06

## 2020-08-02 RX ADMIN — Medication 1 APPLICATION(S): at 11:08

## 2020-08-02 RX ADMIN — Medication 81 MILLIGRAM(S): at 11:07

## 2020-08-02 RX ADMIN — MAGNESIUM OXIDE 400 MG ORAL TABLET 800 MILLIGRAM(S): 241.3 TABLET ORAL at 07:45

## 2020-08-02 RX ADMIN — Medication 1: at 09:19

## 2020-08-02 RX ADMIN — Medication 500000 UNIT(S): at 11:07

## 2020-08-02 RX ADMIN — MYCOPHENOLATE MOFETIL 750 MILLIGRAM(S): 250 CAPSULE ORAL at 20:11

## 2020-08-02 RX ADMIN — CHLORHEXIDINE GLUCONATE 1 APPLICATION(S): 213 SOLUTION TOPICAL at 11:11

## 2020-08-02 RX ADMIN — MAGNESIUM OXIDE 400 MG ORAL TABLET 800 MILLIGRAM(S): 241.3 TABLET ORAL at 17:26

## 2020-08-02 RX ADMIN — EVEROLIMUS 3.5 MILLIGRAM(S): 10 TABLET ORAL at 20:12

## 2020-08-02 RX ADMIN — VALGANCICLOVIR 900 MILLIGRAM(S): 450 TABLET, FILM COATED ORAL at 11:07

## 2020-08-02 RX ADMIN — EVEROLIMUS 3.5 MILLIGRAM(S): 10 TABLET ORAL at 07:46

## 2020-08-02 RX ADMIN — Medication 500000 UNIT(S): at 06:07

## 2020-08-02 RX ADMIN — TAMSULOSIN HYDROCHLORIDE 0.8 MILLIGRAM(S): 0.4 CAPSULE ORAL at 21:58

## 2020-08-02 RX ADMIN — PANTOPRAZOLE SODIUM 40 MILLIGRAM(S): 20 TABLET, DELAYED RELEASE ORAL at 06:07

## 2020-08-02 RX ADMIN — Medication 500000 UNIT(S): at 23:14

## 2020-08-02 RX ADMIN — NYSTATIN CREAM 1 APPLICATION(S): 100000 CREAM TOPICAL at 17:26

## 2020-08-02 RX ADMIN — Medication 2: at 18:21

## 2020-08-02 NOTE — PROGRESS NOTE ADULT - ATTENDING COMMENTS
Hepatology Staff: Yomi Medina MD    I saw and examined the patient along with  Dr. Baker 08-02-20 @ 12:23  Patient Medical Record, hosptial course was reviewed and summarized as below:    Vitals: Vital Signs Last 24 Hrs  T(C): 37.2 (02 Aug 2020 09:12), Max: 37.2 (02 Aug 2020 09:12)  T(F): 98.9 (02 Aug 2020 09:12), Max: 98.9 (02 Aug 2020 09:12)  HR: 89 (02 Aug 2020 09:58) (75 - 89)  BP: 119/62 (02 Aug 2020 09:12) (119/62 - 136/67)  BP(mean): 86 (02 Aug 2020 01:08) (86 - 95)  RR: 18 (02 Aug 2020 09:12) (18 - 18)  SpO2: 99% (02 Aug 2020 09:58) (98% - 100%)    Labs:Creatinine, Serum: 1.09 mg/dL (08-02-20 @ 06:17)  Bilirubin Total, Serum: 0.6 mg/dL (08-02-20 @ 06:17)  INR: 1.04 ratio (08-02-20 @ 06:17)    I/O: I&O's Summary    01 Aug 2020 07:01  -  02 Aug 2020 07:00  --------------------------------------------------------  IN: 1600 mL / OUT: 800 mL / NET: 800 mL    02 Aug 2020 07:01  -  02 Aug 2020 12:23  --------------------------------------------------------  IN: 240 mL / OUT: 0 mL / NET: 240 mL      Nutritional Status:   Albumin, Serum: 3.2 g/dL (08-02-20 @ 06:17)    Last 24 hour events: Patient doing well. Mental statis much better.    Recommendations: Cont with Everolimus 3/3, Prednisone 5 , and CellCept 1 gm bid. Rest of the plan as outlined by Dr. Baker      Plan discussed with Primary team.

## 2020-08-02 NOTE — PROGRESS NOTE ADULT - ASSESSMENT
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 c/b VRE bactermia and delerium    [] POD 29 s/p OLT   - Good graft function, LFTs stable   - Immuno: Tacro DCd 7/19. Cyclosporine DCd 7/27.  Changed to MTOR  inhibitor on 7/27 due to persistent delerium/AMS.  - Everolimus level from 7/30 was 4.2.  Everolimus 3.5mg BID, MMF 750mg BID,  Pred 5mg daily.   - Mental status slowly improving, however he is still altered from baseline. Head CT negative.  Neuro consult evaluation pending.  - Continue Benadryl 50mg QHS  - Continue Rifaxamin 550mg BID.  Trend Ammonia levels  - Daily Everolimus and Cyclosporine levels  - PPX: valcyte solution/nystatin/bactrim  - Improved oral intake.  FU Calorie Count  - Carb restricted diet as tolerated  - Continue ASA 81mg daily  - h/o UGI bleed: Protonix 40mg daily.  Epo x1 ob 8/1  - DVT PPx: SCDs at all times  - Sacral decub: seen by  wound care, recs appreciated, appears to be healing well  - Pain control PRN  - FU cholesterol panel    [] VRE bacteremia (7/10)  - Daptomycin completed on 7/26   - Bld cx from 7/20 with NG, urine cx (7/20) with NG   - Appreciate ID recommendations    [] Hyperglycemia  - Appreciate endocrinology recommendations  - continue to monitor BG with Humalog insulin SSI coverage for now (was previously on Humalog 5U premeal and Lantus 8 units qhs)  - Fingersticks ac and qhs    [] Heel ulcer  - Continue local wound care w/ santyl and DSD as per podiatry    [] Dispo:   - continue 1:1 with tele 64M with IDDM, HLD, obesity, HFpEF with mild LV diastolic dysfunction, MGUS, chronic anemia with a history of duodenal ulcer as well as GAVE and duodenal AVM s/p APC (last on 10/11/19), 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 c/b VRE bactermia and delerium    [] POD 29 s/p OLT   - Good graft function, LFTs stable   - Immuno: Tacro DCd 7/19. Cyclosporine DCd 7/27.  Changed to MTOR  inhibitor on 7/27 due to persistent delerium/AMS.  - Everolimus level from 7/30 was 4.2.  On Everolimus 3.5mg BID, MMF 750mg BID,  Pred 5mg daily.   - Mental status improving. Head CT negative.  Neuro consult evaluation pending.  - Continue Benadryl 50mg QHS  - Continue Rifaxamin 550mg BID.  Trend Ammonia levels  - Daily Everolimus and Cyclosporine levels  - PPX: valcyte solution/nystatin/bactrim  - Improved oral intake.  FU Calorie Count  - Carb restricted diet as tolerated  - Continue ASA 81mg daily  - h/o UGI bleed: Protonix 40mg daily.  Epo x1 ob 8/1  - DVT PPx: SCDs at all times  - Sacral decub: seen by  wound care, recs appreciated, appears to be healing well  - Pain control PRN  - FU cholesterol panel    [] VRE bacteremia (7/10)  - Daptomycin completed on 7/26   - Bld cx from 7/20 with NG, urine cx (7/20) with NG   - Appreciate ID recommendations    [] Hyperglycemia  - Appreciate endocrinology recommendations  - continue to monitor BG with Humalog insulin SSI coverage for now (was previously on Humalog 5U premeal and Lantus 8 units qhs)  - Fingersticks ac and qhs    [] Heel ulcer  - Continue local wound care w/ santyl and DSD as per podiatry    [] Dispo:   - continue 1:1 with tele

## 2020-08-02 NOTE — PROGRESS NOTE ADULT - ASSESSMENT
64 M hx of DM, HLD, GERD, HFpEF with mild LV diastolic dysfunction, and decompensated JEAN cirrhosis c/b ascites with hx of SBP, HE, duodenal ulcer, GAVE and duodenal AVM s/p APC (last on 10/11/19), s/p liver transplant this admission (7/2/20). Post-op course c/b tacrolimus toxicity and hospital derlirium.      Impression:  #S/p liver transplant 7/2: liver enzymes continue to downtrend  OR details:  - L groin cutdown for vv bypass   - anastomosis: bicaval end-end/CBD duct-duct/HA & PV end-end, all standard anatomy.    - IOC with good flow  - Simulect induction. 500mg Solumedrol  - JPs x3 with T-Tube  - 14U pRBC, 14U FFP, 10 PLT, 11 CRYO, 500mL returned from cell saver, EBL 5L, UOP 1100mL  Recipient Info:   ABO: A  CMV:  Neg  EBV Positive  Donor:  Donor ID:  [id number] Match: [id number]  Age: 29 ABO:  A1 High Risk:   No COD: Cardiovascular  Anti CMV positive, EBV IgG- positive  HepBcAb-neg, Hepatitis C-DANA- neg, Hepatitis C ab-neg    #Halluciniations/agitation/AMS/tremors – due to CNI toxicity, improving now that pt is off CNI and on everolimus  #Sacral decubitus ulcer: stage I, does not appear infected  #GI bleed: Resolved. Hgb stable with no overt bleeding post-transplant. 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 GAVE and PHG likely improved pos ttransplant.  #R posterior heel wound w/ overlying eschar –Local wound care. no signs of infections, XR neg for gas, evaluated by podiatry and ID. MRI w/o contrast limited, but no overt signs of active infection.  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     Recommendation:  - continue with everolimus dosed per transplant surgery, f/u levels (3mg BID)  - continue Cellcept dosing as per transplant surgery  - continue prednisone 5 mg PO daily  - frequent reorientation and visitation   - normalized sleep/wake cycle environment  - continue nystatin, Bactrim and Valcyte ppx  - physical therapy daily to get OOB and ambulating  - calorie count  - continue with mag oxide 200 mg BID  - monitor Hb on daily CBC - continue PO PPI BID, monitor bowel movements  - c/w ASA 81mg daily  - continue to hold diuretics  - follow up Wound care recommendations for R heel ulcer and sacral decub  - follow up other Transplant surgery recommendations

## 2020-08-02 NOTE — PROGRESS NOTE ADULT - PROBLEM SELECTOR PLAN 1
- s/p Liver transplant 07/02  - anti-rejection and post op prophylaxis per transplant hepatology  - diet as tolerated  - greatly appreciate transplant service care and efforts  - discussed with Transplant surgery NP

## 2020-08-02 NOTE — PROGRESS NOTE ADULT - SUBJECTIVE AND OBJECTIVE BOX
GASTROENTEROLOGY    Patient seen and examined at bedside. Resting in bed.  No acute overnight events noted      MEDICATIONS  (STANDING):  aspirin enteric coated 81 milliGRAM(s) Oral daily  chlorhexidine 2% Cloths 1 Application(s) Topical <User Schedule>  collagenase Ointment 1 Application(s) Topical daily  diphenhydrAMINE 50 milliGRAM(s) Oral at bedtime  everolimus (ZORTRESS) 3.5 milliGRAM(s) Oral <User Schedule>  insulin lispro (HumaLOG) corrective regimen sliding scale   SubCutaneous Before meals and at bedtime  magnesium oxide 800 milliGRAM(s) Oral two times a day with meals  mycophenolate mofetil 750 milliGRAM(s) Oral <User Schedule>  nystatin    Suspension [id number] Unit(s) Oral four times a day  nystatin Cream 1 Application(s) Topical two times a day  pantoprazole    Tablet 40 milliGRAM(s) Oral before breakfast  predniSONE   Tablet 5 milliGRAM(s) Oral daily  tamsulosin 0.8 milliGRAM(s) Oral at bedtime  trimethoprim   80 mG/sulfamethoxazole 400 mG 1 Tablet(s) Oral daily  valGANciclovir 50 mG/mL Oral Solution 900 milliGRAM(s) Oral daily        T(F): 98.1 (08-02-20 @ 05:13), Max: 98.3 (08-01-20 @ 09:00)  HR: 75 (08-02-20 @ 05:13) (75 - 85)  BP: 133/64 (08-02-20 @ 05:13) (103/55 - 136/67)  RR: 18 (08-02-20 @ 05:13) (18 - 18)  SpO2: 98% (08-02-20 @ 05:13) (96% - 100%)  Wt(kg): --    PHYSICAL EXAM  GENERAL:   NAD  HEENT:  NC/AT, no JVD, sclera-anicteric  CHEST:  clear to ascultation bilaterally, respirations nonlabored  HEART:  +S1+S2   ABDOMEN:  Soft, non-distended, (+) bowel sounds, incision c/d/i  EXTREMITIES:  + peripheral edema  NEURO:  confused, less tremulous   SKIN:  No rash/warm/dry      LABS:                        8.6<L>  3.18<L> )-----------( 114<L>    ( 02 Aug 2020 06:17 )             25.8<L>  02 Aug 2020 06:17    08-02    139  |  106  |  31<H>  ----------------------------<  162<H>  4.7   |  21<L>  |  1.09    Ca    9.1      02 Aug 2020 06:17  Phos  3.2     08-02  Mg     1.7     08-02    TPro  5.3<L>  /  Alb  3.2<L>  /  TBili  0.6  /  DBili  0.2  /  AST  15  /  ALT  23  /  AlkPhos  102  08-02    LIVER FUNCTIONS - ( 02 Aug 2020 06:17 )  Alb: 3.2 g/dL / Pro: 5.3 g/dL / ALK PHOS: 102 U/L / ALT: 23 U/L / AST: 15 U/L / GGT: x           PT/INR - ( 02 Aug 2020 06:17 )   PT: 12.4 sec;   INR: 1.04 ratio         PTT - ( 02 Aug 2020 06:17 )  PTT:29.2 sec  I&O's Detail    01 Aug 2020 07:01  -  02 Aug 2020 07:00  --------------------------------------------------------  IN:    Oral Fluid: 1500 mL    Solution: 100 mL  Total IN: 1600 mL    OUT:    Voided: 800 mL  Total OUT: 800 mL    Total NET: 800 mL

## 2020-08-02 NOTE — PROGRESS NOTE ADULT - SUBJECTIVE AND OBJECTIVE BOX
OLTx      Date:  7/2/2020       POD#31    Present: Patient seen with multidisciplinary team including Transplant Surgeon Dr. Alonzo, Transplant Nephrologist Dr. Rowland, NP Xiao Hurley and unit RN during am rounds and examined by  Dr. Alonzo. Disciplines not in attendance will be notified of the plan.    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     Re-admitted on 6/11:   ·	worsening HE  ·	UTI/VRE: tx with linezolid 6/15-22, bactrim DS 6/22 - 7/2  ·	MISA/Hyponatremia  ·	UGI bleed (melena) s/p EGD (6/22) c/w hemorrhagic duodenitis/gastritis; Grade 2 EV; requiring multiple transfusions  ·	Known h/o R foot ulcer (MRI neg for OM)  ·	s/p OLTx on 7/2/2020, post op liver doppler with patent vessels  ·	post op course c/b delirium and VRE bacteremia (7/10)   ·	7/31 Head CT negative.  Started on Rifaxamin for Ammonia level 63. Neuro consulted  ·	8/1 Trazadone DCd. Started Benadryl qhs    Interval events:  - good graft function;  LFTs stable  - Ammonia level 51 on Rifaxamin 550mg BID  - Received Benadryl 50mg x1.  Slept x 6 hours with BIPAP overnight. Lethargic this AM  - Improved oral intake on Calorie count  - SCR 1.09 from 1.37    UOP 800ml plus incontinence   + BM  - 1 PRBC yesterday.  H.H 8.6/25.8  - Cyclosporin level downtrending 32   - Received Stat 0.5mg and Everolimus increased to 3.5mg BID      Potential Discharge date: pending clinical improvement     Education:  Medications    Plan of care:  See Below      MEDICATIONS  (STANDING):  aspirin enteric coated 81 milliGRAM(s) Oral daily  chlorhexidine 2% Cloths 1 Application(s) Topical <User Schedule>  collagenase Ointment 1 Application(s) Topical daily  diphenhydrAMINE 50 milliGRAM(s) Oral at bedtime  everolimus (ZORTRESS) 3.5 milliGRAM(s) Oral <User Schedule>  insulin lispro (HumaLOG) corrective regimen sliding scale   SubCutaneous Before meals and at bedtime  magnesium oxide 800 milliGRAM(s) Oral two times a day with meals  mycophenolate mofetil 750 milliGRAM(s) Oral <User Schedule>  nystatin    Suspension 200055 Unit(s) Oral four times a day  nystatin Cream 1 Application(s) Topical two times a day  pantoprazole    Tablet 40 milliGRAM(s) Oral before breakfast  predniSONE   Tablet 5 milliGRAM(s) Oral daily  tamsulosin 0.8 milliGRAM(s) Oral at bedtime  trimethoprim   80 mG/sulfamethoxazole 400 mG 1 Tablet(s) Oral daily  valGANciclovir 50 mG/mL Oral Solution 900 milliGRAM(s) Oral daily    MEDICATIONS  (PRN):      PAST MEDICAL & SURGICAL HISTORY:  GIB (gastrointestinal bleeding)  GERD with esophagitis: Gastritis &amp; Non Bleeding Ulcers  Hepatic encephalopathy  Obesity  Fatty liver disease, nonalcoholic  Renal stones: 25 years ago  Hypertension  Neuropathy  Hypercholesteremia  Diabetes  S/P cholecystectomy      Vital Signs Last 24 Hrs  T(C): 37.2 (02 Aug 2020 09:12), Max: 37.2 (02 Aug 2020 09:12)  T(F): 98.9 (02 Aug 2020 09:12), Max: 98.9 (02 Aug 2020 09:12)  HR: 85 (02 Aug 2020 09:12) (75 - 85)  BP: 119/62 (02 Aug 2020 09:12) (119/62 - 136/67)  BP(mean): 86 (02 Aug 2020 01:08) (86 - 95)  RR: 18 (02 Aug 2020 09:12) (18 - 18)  SpO2: 100% (02 Aug 2020 09:12) (96% - 100%)    I&O's Summary    01 Aug 2020 07:01  -  02 Aug 2020 07:00  --------------------------------------------------------  IN: 1600 mL / OUT: 800 mL / NET: 800 mL    02 Aug 2020 07:01  -  02 Aug 2020 10:04  --------------------------------------------------------  IN: 240 mL / OUT: 0 mL / NET: 240 mL                              8.6    3.18  )-----------( 114      ( 02 Aug 2020 06:17 )             25.8     08-02    139  |  106  |  31<H>  ----------------------------<  162<H>  4.7   |  21<L>  |  1.09    Ca    9.1      02 Aug 2020 06:17  Phos  3.2     08-02  Mg     1.7     08-02    TPro  5.3<L>  /  Alb  3.2<L>  /  TBili  0.6  /  DBili  0.2  /  AST  15  /  ALT  23  /  AlkPhos  102  08-0    ROS  Unable to obtain due to AMS    Physical Exam  Constitutional: lethargic on exam this am  Eyes: Anicteric, PERRLA  ENMT: nc/at  Neck: no JVD  Respiratory: CTA B/L, unlabored breathing   Cardiovascular: RRR  Gastrointestinal: soft, NT, ND, T-tube tied off and covered with DSD  Genitourinary: Voiding spontaneously  Extremities: heel wound healing, no drainage. No edema   Vascular: Palpable dp pulses bilaterally  Neurological: very lethargic on exam, TORREZ, equal hand grasp  Skin: no rashes  Musculoskeletal: Moving all extremities  Psychiatric: Responsive OLTx      Date:  7/2/2020       POD#31    Present: Patient seen with multidisciplinary team including Transplant Surgeon Dr. Alonzo, Transplant Nephrologist Dr. Rowland, NP Xiao Hurley and unit RN during am rounds and examined by  Dr. Alonzo. Disciplines not in attendance will be notified of the plan.    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     Re-admitted on 6/11:   ·	worsening HE  ·	UTI/VRE: tx with linezolid 6/15-22, bactrim DS 6/22 - 7/2  ·	MISA/Hyponatremia  ·	UGI bleed (melena) s/p EGD (6/22) c/w hemorrhagic duodenitis/gastritis; Grade 2 EV; requiring multiple transfusions  ·	Known h/o R foot ulcer (MRI neg for OM)  ·	s/p OLTx on 7/2/2020, post op liver doppler with patent vessels  ·	post op course c/b delirium and VRE bacteremia (7/10)   ·	7/31 Head CT negative.  Started on Rifaxamin for Ammonia level 63. Neuro consulted  ·	8/1 Trazadone DCd. Started Benadryl qhs    Interval events:  - good graft function;  LFTs stable  - Ammonia level 51 on Rifaxamin 550mg BID  - Received Benadryl 50mg x1.  Slept x 6 hours with BIPAP overnight. Lethargic this AM, but later woke up. Ate breakfast. Pleasant, cooperative and calm  - Improving oral intake on Calorie count  - SCR 1.09 from 1.37    UOP 800ml plus incontinence   + BM  - 1 PRBC yesterday.  H.H 8.6/25.8  - Cyclosporin level downtrending 32   - Received Stat 0.5mg and Everolimus increased to 3.5mg BID      Potential Discharge date: pending clinical improvement     Education:  Medications    Plan of care:  See Below      MEDICATIONS  (STANDING):  aspirin enteric coated 81 milliGRAM(s) Oral daily  chlorhexidine 2% Cloths 1 Application(s) Topical <User Schedule>  collagenase Ointment 1 Application(s) Topical daily  diphenhydrAMINE 50 milliGRAM(s) Oral at bedtime  everolimus (ZORTRESS) 3.5 milliGRAM(s) Oral <User Schedule>  insulin lispro (HumaLOG) corrective regimen sliding scale   SubCutaneous Before meals and at bedtime  magnesium oxide 800 milliGRAM(s) Oral two times a day with meals  mycophenolate mofetil 750 milliGRAM(s) Oral <User Schedule>  nystatin    Suspension 515411 Unit(s) Oral four times a day  nystatin Cream 1 Application(s) Topical two times a day  pantoprazole    Tablet 40 milliGRAM(s) Oral before breakfast  predniSONE   Tablet 5 milliGRAM(s) Oral daily  tamsulosin 0.8 milliGRAM(s) Oral at bedtime  trimethoprim   80 mG/sulfamethoxazole 400 mG 1 Tablet(s) Oral daily  valGANciclovir 50 mG/mL Oral Solution 900 milliGRAM(s) Oral daily    MEDICATIONS  (PRN):      PAST MEDICAL & SURGICAL HISTORY:  GIB (gastrointestinal bleeding)  GERD with esophagitis: Gastritis &amp; Non Bleeding Ulcers  Hepatic encephalopathy  Obesity  Fatty liver disease, nonalcoholic  Renal stones: 25 years ago  Hypertension  Neuropathy  Hypercholesteremia  Diabetes  S/P cholecystectomy      Vital Signs Last 24 Hrs  T(C): 37.2 (02 Aug 2020 09:12), Max: 37.2 (02 Aug 2020 09:12)  T(F): 98.9 (02 Aug 2020 09:12), Max: 98.9 (02 Aug 2020 09:12)  HR: 85 (02 Aug 2020 09:12) (75 - 85)  BP: 119/62 (02 Aug 2020 09:12) (119/62 - 136/67)  BP(mean): 86 (02 Aug 2020 01:08) (86 - 95)  RR: 18 (02 Aug 2020 09:12) (18 - 18)  SpO2: 100% (02 Aug 2020 09:12) (96% - 100%)    I&O's Summary    01 Aug 2020 07:01  -  02 Aug 2020 07:00  --------------------------------------------------------  IN: 1600 mL / OUT: 800 mL / NET: 800 mL    02 Aug 2020 07:01  -  02 Aug 2020 10:04  --------------------------------------------------------  IN: 240 mL / OUT: 0 mL / NET: 240 mL                              8.6    3.18  )-----------( 114      ( 02 Aug 2020 06:17 )             25.8     08-02    139  |  106  |  31<H>  ----------------------------<  162<H>  4.7   |  21<L>  |  1.09    Ca    9.1      02 Aug 2020 06:17  Phos  3.2     08-02  Mg     1.7     08-02    TPro  5.3<L>  /  Alb  3.2<L>  /  TBili  0.6  /  DBili  0.2  /  AST  15  /  ALT  23  /  AlkPhos  102  08-0    ROS  Unable to obtain due to AMS    Physical Exam  Constitutional: lethargic on exam this am  Eyes: Anicteric, PERRLA  ENMT: nc/at  Neck: no JVD  Respiratory: CTA B/L, unlabored breathing   Cardiovascular: RRR  Gastrointestinal: soft, NT, ND, T-tube tied off and covered with DSD  Genitourinary: Voiding spontaneously  Extremities: heel wound healing, no drainage. No edema   Vascular: Palpable dp pulses bilaterally  Neurological: lethargic, but awakens, cooperative, TORREZ  Skin: no rashes  Musculoskeletal: Moving all extremities  Psychiatric: Responsive

## 2020-08-02 NOTE — PROGRESS NOTE ADULT - SUBJECTIVE AND OBJECTIVE BOX
Chief Complaint:  Patient is a 64y old  Male who presents with a chief complaint of Liver failure, hepatic encephalopathy (02 Aug 2020 08:52)      Interval Events: Pt sleeping when examined. Per nurse, there was no acute overnight events. Mental status is improved.   ROS: All 12 point system except listed above were otherwise negative.    Allergies:  codeine (Anaphylaxis)        Hospital Medications:  aspirin enteric coated 81 milliGRAM(s) Oral daily  chlorhexidine 2% Cloths 1 Application(s) Topical <User Schedule>  collagenase Ointment 1 Application(s) Topical daily  diphenhydrAMINE 50 milliGRAM(s) Oral at bedtime  everolimus (ZORTRESS) 3.5 milliGRAM(s) Oral <User Schedule>  insulin lispro (HumaLOG) corrective regimen sliding scale   SubCutaneous Before meals and at bedtime  magnesium oxide 800 milliGRAM(s) Oral two times a day with meals  mycophenolate mofetil 750 milliGRAM(s) Oral <User Schedule>  nystatin    Suspension 724778 Unit(s) Oral four times a day  nystatin Cream 1 Application(s) Topical two times a day  pantoprazole    Tablet 40 milliGRAM(s) Oral before breakfast  predniSONE   Tablet 5 milliGRAM(s) Oral daily  tamsulosin 0.8 milliGRAM(s) Oral at bedtime  trimethoprim   80 mG/sulfamethoxazole 400 mG 1 Tablet(s) Oral daily  valGANciclovir 50 mG/mL Oral Solution 900 milliGRAM(s) Oral daily      PMHX/PSHX:  GIB (gastrointestinal bleeding)  GERD with esophagitis  Hepatic encephalopathy  Obesity  Fatty liver disease, nonalcoholic  Renal stones  Hypertension  Neuropathy  Hypercholesteremia  Diabetes  S/P cholecystectomy  No significant past surgical history      Family history:  Family history of type 2 diabetes mellitus  Family history of hypertension  Family history of stomach cancer  No pertinent family history in first degree relatives    There is no family history of peptic ulcer disease, gastric cancer, colon polyps, colon cancer, celiac disease, biliary, hepatic, or pancreatic disease.  None of the female relatives have breast, uterine, or ovarian cancer.     PHYSICAL EXAM:   Vital Signs:  Vital Signs Last 24 Hrs  T(C): 37.2 (02 Aug 2020 09:12), Max: 37.2 (02 Aug 2020 09:12)  T(F): 98.9 (02 Aug 2020 09:12), Max: 98.9 (02 Aug 2020 09:12)  HR: 85 (02 Aug 2020 09:12) (75 - 85)  BP: 119/62 (02 Aug 2020 09:12) (119/62 - 136/67)  BP(mean): 86 (02 Aug 2020 01:08) (86 - 95)  RR: 18 (02 Aug 2020 09:12) (18 - 18)  SpO2: 100% (02 Aug 2020 09:12) (96% - 100%)  Daily     Daily     GENERAL: no acute distress  NEURO: alert, confused, no asterixis, minimal tremors  HEENT: anicteric sclera, no conjunctival pallor appreciated  CHEST: no respiratory distress, no accessory muscle use  CARDIAC: regular rate, rhythm  ABDOMEN: soft, non-tender, non-distended, no rebound or guarding  EXTREMITIES: warm, well perfused, no edema  SKIN: no lesions noted    LABS:                        8.6    3.18  )-----------( 114      ( 02 Aug 2020 06:17 )             25.8     Mean Cell Volume: 96.6 fl (08-02-20 @ 06:17)    08-02    139  |  106  |  31<H>  ----------------------------<  162<H>  4.7   |  21<L>  |  1.09    Ca    9.1      02 Aug 2020 06:17  Phos  3.2     08-02  Mg     1.7     08-02    TPro  5.3<L>  /  Alb  3.2<L>  /  TBili  0.6  /  DBili  0.2  /  AST  15  /  ALT  23  /  AlkPhos  102  08-02    LIVER FUNCTIONS - ( 02 Aug 2020 06:17 )  Alb: 3.2 g/dL / Pro: 5.3 g/dL / ALK PHOS: 102 U/L / ALT: 23 U/L / AST: 15 U/L / GGT: x           PT/INR - ( 02 Aug 2020 06:17 )   PT: 12.4 sec;   INR: 1.04 ratio         PTT - ( 02 Aug 2020 06:17 )  PTT:29.2 sec    Amylase Serum--      Lipase serum--       [id number]                          8.6    3.18  )-----------( 114      ( 02 Aug 2020 06:17 )             25.8                         8.0    3.53  )-----------( 121      ( 01 Aug 2020 05:36 )             24.2                         8.4    3.42  )-----------( 125      ( 31 Jul 2020 06:50 )             25.0     Imaging:

## 2020-08-03 LAB
ALBUMIN SERPL ELPH-MCNC: 3.1 G/DL — LOW (ref 3.3–5)
ALP SERPL-CCNC: 101 U/L — SIGNIFICANT CHANGE UP (ref 40–120)
ALT FLD-CCNC: 22 U/L — SIGNIFICANT CHANGE UP (ref 10–45)
AMMONIA BLD-MCNC: 41 UMOL/L — SIGNIFICANT CHANGE UP (ref 11–55)
ANION GAP SERPL CALC-SCNC: 10 MMOL/L — SIGNIFICANT CHANGE UP (ref 5–17)
APTT BLD: 28.5 SEC — SIGNIFICANT CHANGE UP (ref 27.5–35.5)
AST SERPL-CCNC: 20 U/L — SIGNIFICANT CHANGE UP (ref 10–40)
BILIRUB DIRECT SERPL-MCNC: 0.2 MG/DL — SIGNIFICANT CHANGE UP (ref 0–0.2)
BILIRUB INDIRECT FLD-MCNC: 0.4 MG/DL — SIGNIFICANT CHANGE UP (ref 0.2–1)
BILIRUB SERPL-MCNC: 0.6 MG/DL — SIGNIFICANT CHANGE UP (ref 0.2–1.2)
BUN SERPL-MCNC: 29 MG/DL — HIGH (ref 7–23)
CALCIUM SERPL-MCNC: 9 MG/DL — SIGNIFICANT CHANGE UP (ref 8.4–10.5)
CHLORIDE SERPL-SCNC: 104 MMOL/L — SIGNIFICANT CHANGE UP (ref 96–108)
CO2 SERPL-SCNC: 24 MMOL/L — SIGNIFICANT CHANGE UP (ref 22–31)
CREAT SERPL-MCNC: 1.19 MG/DL — SIGNIFICANT CHANGE UP (ref 0.5–1.3)
CYCLOSPORINE SER-MCNC: <30 NG/ML — LOW (ref 150–400)
GLUCOSE BLDC GLUCOMTR-MCNC: 165 MG/DL — HIGH (ref 70–99)
GLUCOSE BLDC GLUCOMTR-MCNC: 246 MG/DL — HIGH (ref 70–99)
GLUCOSE BLDC GLUCOMTR-MCNC: 258 MG/DL — HIGH (ref 70–99)
GLUCOSE BLDC GLUCOMTR-MCNC: 279 MG/DL — HIGH (ref 70–99)
GLUCOSE SERPL-MCNC: 156 MG/DL — HIGH (ref 70–99)
HCT VFR BLD CALC: 25.1 % — LOW (ref 39–50)
HGB BLD-MCNC: 8.3 G/DL — LOW (ref 13–17)
INR BLD: 1.06 RATIO — SIGNIFICANT CHANGE UP (ref 0.88–1.16)
MAGNESIUM SERPL-MCNC: 1.6 MG/DL — SIGNIFICANT CHANGE UP (ref 1.6–2.6)
MCHC RBC-ENTMCNC: 32.2 PG — SIGNIFICANT CHANGE UP (ref 27–34)
MCHC RBC-ENTMCNC: 33.1 GM/DL — SIGNIFICANT CHANGE UP (ref 32–36)
MCV RBC AUTO: 97.3 FL — SIGNIFICANT CHANGE UP (ref 80–100)
MISCELLANEOUS TEST NAME: SIGNIFICANT CHANGE UP
NRBC # BLD: 0 /100 WBCS — SIGNIFICANT CHANGE UP (ref 0–0)
PHOSPHATE SERPL-MCNC: 2.9 MG/DL — SIGNIFICANT CHANGE UP (ref 2.5–4.5)
PLATELET # BLD AUTO: 118 K/UL — LOW (ref 150–400)
POTASSIUM SERPL-MCNC: 4.6 MMOL/L — SIGNIFICANT CHANGE UP (ref 3.5–5.3)
POTASSIUM SERPL-SCNC: 4.6 MMOL/L — SIGNIFICANT CHANGE UP (ref 3.5–5.3)
PROCALCITONIN SERPL-MCNC: 0.16 NG/ML — HIGH (ref 0.02–0.1)
PROT SERPL-MCNC: 5.3 G/DL — LOW (ref 6–8.3)
PROTHROM AB SERPL-ACNC: 12.6 SEC — SIGNIFICANT CHANGE UP (ref 10.6–13.6)
RBC # BLD: 2.58 M/UL — LOW (ref 4.2–5.8)
RBC # FLD: 17.7 % — HIGH (ref 10.3–14.5)
SODIUM SERPL-SCNC: 138 MMOL/L — SIGNIFICANT CHANGE UP (ref 135–145)
WBC # BLD: 3.35 K/UL — LOW (ref 3.8–10.5)
WBC # FLD AUTO: 3.35 K/UL — LOW (ref 3.8–10.5)

## 2020-08-03 PROCEDURE — 99232 SBSQ HOSP IP/OBS MODERATE 35: CPT | Mod: GC

## 2020-08-03 PROCEDURE — 99232 SBSQ HOSP IP/OBS MODERATE 35: CPT | Mod: GC,24

## 2020-08-03 RX ORDER — MAGNESIUM SULFATE 500 MG/ML
1 VIAL (ML) INJECTION ONCE
Refills: 0 | Status: COMPLETED | OUTPATIENT
Start: 2020-08-03 | End: 2020-08-03

## 2020-08-03 RX ORDER — MAGNESIUM SULFATE 500 MG/ML
2 VIAL (ML) INJECTION ONCE
Refills: 0 | Status: COMPLETED | OUTPATIENT
Start: 2020-08-03 | End: 2020-08-03

## 2020-08-03 RX ADMIN — VALGANCICLOVIR 900 MILLIGRAM(S): 450 TABLET, FILM COATED ORAL at 11:45

## 2020-08-03 RX ADMIN — Medication 81 MILLIGRAM(S): at 11:42

## 2020-08-03 RX ADMIN — TAMSULOSIN HYDROCHLORIDE 0.8 MILLIGRAM(S): 0.4 CAPSULE ORAL at 21:26

## 2020-08-03 RX ADMIN — Medication 500000 UNIT(S): at 17:38

## 2020-08-03 RX ADMIN — Medication 100 GRAM(S): at 10:46

## 2020-08-03 RX ADMIN — NYSTATIN CREAM 1 APPLICATION(S): 100000 CREAM TOPICAL at 06:00

## 2020-08-03 RX ADMIN — MYCOPHENOLATE MOFETIL 750 MILLIGRAM(S): 250 CAPSULE ORAL at 19:50

## 2020-08-03 RX ADMIN — MAGNESIUM OXIDE 400 MG ORAL TABLET 800 MILLIGRAM(S): 241.3 TABLET ORAL at 07:49

## 2020-08-03 RX ADMIN — Medication 1: at 07:59

## 2020-08-03 RX ADMIN — Medication 1 APPLICATION(S): at 11:58

## 2020-08-03 RX ADMIN — Medication 3: at 12:50

## 2020-08-03 RX ADMIN — Medication 500000 UNIT(S): at 06:00

## 2020-08-03 RX ADMIN — Medication 5 MILLIGRAM(S): at 06:00

## 2020-08-03 RX ADMIN — Medication 1 TABLET(S): at 11:42

## 2020-08-03 RX ADMIN — Medication 50 GRAM(S): at 10:46

## 2020-08-03 RX ADMIN — PANTOPRAZOLE SODIUM 40 MILLIGRAM(S): 20 TABLET, DELAYED RELEASE ORAL at 07:53

## 2020-08-03 RX ADMIN — EVEROLIMUS 3.5 MILLIGRAM(S): 10 TABLET ORAL at 19:49

## 2020-08-03 RX ADMIN — CHLORHEXIDINE GLUCONATE 1 APPLICATION(S): 213 SOLUTION TOPICAL at 07:53

## 2020-08-03 RX ADMIN — Medication 2: at 17:40

## 2020-08-03 RX ADMIN — Medication 500000 UNIT(S): at 11:42

## 2020-08-03 RX ADMIN — EVEROLIMUS 3.5 MILLIGRAM(S): 10 TABLET ORAL at 07:48

## 2020-08-03 RX ADMIN — MAGNESIUM OXIDE 400 MG ORAL TABLET 800 MILLIGRAM(S): 241.3 TABLET ORAL at 17:38

## 2020-08-03 RX ADMIN — NYSTATIN CREAM 1 APPLICATION(S): 100000 CREAM TOPICAL at 17:38

## 2020-08-03 RX ADMIN — MYCOPHENOLATE MOFETIL 750 MILLIGRAM(S): 250 CAPSULE ORAL at 07:49

## 2020-08-03 NOTE — PROGRESS NOTE ADULT - ATTENDING COMMENTS
immunosuppression levels reviewed. continue Everolimus 3.5BID. continue to send daily Everolimus levels

## 2020-08-03 NOTE — PROGRESS NOTE ADULT - SUBJECTIVE AND OBJECTIVE BOX
Chief Complaint:  Patient is a 64y old  Male who presents with a chief complaint of Liver failure, hepatic encephalopathy (02 Aug 2020 10:01)    Reason for consult: s/p OLT    Interval Events: Pt w/ significantly improved mental status, feels better, eating well.     Hospital Medications:  aspirin enteric coated 81 milliGRAM(s) Oral daily  chlorhexidine 2% Cloths 1 Application(s) Topical <User Schedule>  collagenase Ointment 1 Application(s) Topical daily  diphenhydrAMINE 50 milliGRAM(s) Oral <User Schedule> PRN  everolimus (ZORTRESS) 3.5 milliGRAM(s) Oral <User Schedule>  insulin lispro (HumaLOG) corrective regimen sliding scale   SubCutaneous Before meals and at bedtime  magnesium oxide 800 milliGRAM(s) Oral two times a day with meals  mycophenolate mofetil 750 milliGRAM(s) Oral <User Schedule>  nystatin    Suspension 660430 Unit(s) Oral four times a day  nystatin Cream 1 Application(s) Topical two times a day  pantoprazole    Tablet 40 milliGRAM(s) Oral before breakfast  predniSONE   Tablet 5 milliGRAM(s) Oral daily  tamsulosin 0.8 milliGRAM(s) Oral at bedtime  trimethoprim   80 mG/sulfamethoxazole 400 mG 1 Tablet(s) Oral daily  valGANciclovir 50 mG/mL Oral Solution 900 milliGRAM(s) Oral daily      ROS:   General:  No  fevers, chills, night sweats, fatigue  Eyes:  Good vision, no reported pain  ENT:  No sore throat, pain, runny nose  CV:  No pain, palpitations  Pulm:  No dyspnea, cough  GI:  See HPI, otherwise negative  :  No  incontinence, nocturia  Muscle:  No pain, weakness  Neuro:  No memory problems  Psych:  No insomnia, mood problems, depression  Endocrine:  No polyuria, polydipsia, cold/heat intolerance  Heme:  No petechiae, ecchymosis, easy bruisability  Skin:  No rash    PHYSICAL EXAM:   Vital Signs:  Vital Signs Last 24 Hrs  T(C): 36.8 (03 Aug 2020 09:10), Max: 36.9 (02 Aug 2020 12:59)  T(F): 98.2 (03 Aug 2020 09:10), Max: 98.4 (02 Aug 2020 12:59)  HR: 86 (03 Aug 2020 09:10) (77 - 100)  BP: 104/61 (03 Aug 2020 09:10) (104/61 - 149/70)  BP(mean): 76 (03 Aug 2020 09:10) (76 - 99)  RR: 20 (03 Aug 2020 09:10) (18 - 20)  SpO2: 98% (03 Aug 2020 09:10) (97% - 100%)  Daily     Daily Weight in k.5 (03 Aug 2020 06:23)    GENERAL: no acute distress, sitting up in chair  NEURO: alert, no asterixis, minimal tremors, oriented x 3  HEENT: anicteric sclera, no conjunctival pallor appreciated  CHEST: no respiratory distress, no accessory muscle use  CARDIAC: regular rate, rhythm  ABDOMEN: soft, non-tender, non-distended, no rebound or guarding, incisions c/d/i  EXTREMITIES: warm, well perfused, no edema  SKIN: no lesions noted    LABS: reviewed                        8.3    3.35  )-----------( 118      ( 03 Aug 2020 06:21 )             25.1     08-03    138  |  104  |  29<H>  ----------------------------<  156<H>  4.6   |  24  |  1.19    Ca    9.0      03 Aug 2020 06:21  Phos  2.9     08-03  Mg     1.6     08-03    TPro  5.3<L>  /  Alb  3.1<L>  /  TBili  0.6  /  DBili  0.2  /  AST  20  /  ALT  22  /  AlkPhos  101  08-03    LIVER FUNCTIONS - ( 03 Aug 2020 06:21 )  Alb: 3.1 g/dL / Pro: 5.3 g/dL / ALK PHOS: 101 U/L / ALT: 22 U/L / AST: 20 U/L / GGT: x             Interval Diagnostic Studies: see sunrise for full report

## 2020-08-03 NOTE — PROGRESS NOTE ADULT - SUBJECTIVE AND OBJECTIVE BOX
TRANSPLANT SURGERY MULTIDISCIPLINARY NOTE    OLTx      Date:  7/2/2020       POD#31    Present: Patient seen and examined with multidisciplinary team including Transplant Surgeon Dr. Chris/Clinton, Transplant Hepatologist Dr. San, Nephrologist Dr. Patel, Inpatient: Oswald/Artem/Barrett. Disciplines not in attendance will be notified of the plan.    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     Re-admitted on 6/11:   ·	worsening HE  ·	UTI/VRE: tx with linezolid 6/15-22, bactrim DS 6/22 - 7/2  ·	MISA/Hyponatremia  ·	UGI bleed (melena) s/p EGD (6/22) c/w hemorrhagic duodenitis/gastritis; Grade 2 EV; requiring multiple transfusions  ·	Known h/o R foot ulcer (MRI neg for OM)  ·	s/p OLTx on 7/2/2020, post op liver doppler with patent vessels  ·	post op course c/b delirium and VRE bacteremia (7/10), now off ABX, off FK and off Cyclo.  Improving on Everolimus    Interval events:  - good graft function;  LFTs stable  - mental status with much improvement after medication adjustment  - Improving oral intake on Calorie count  - having bowel function with good UO  - ambulating today with PT    Potential Discharge date: pending clinical improvement     Education:  Medications    Plan of care:  See Below      MEDICATIONS  (STANDING):  aspirin enteric coated 81 milliGRAM(s) Oral daily  chlorhexidine 2% Cloths 1 Application(s) Topical <User Schedule>  collagenase Ointment 1 Application(s) Topical daily  everolimus (ZORTRESS) 3.5 milliGRAM(s) Oral <User Schedule>  insulin lispro (HumaLOG) corrective regimen sliding scale   SubCutaneous Before meals and at bedtime  magnesium oxide 800 milliGRAM(s) Oral two times a day with meals  mycophenolate mofetil 750 milliGRAM(s) Oral <User Schedule>  nystatin    Suspension 927562 Unit(s) Oral four times a day  nystatin Cream 1 Application(s) Topical two times a day  pantoprazole    Tablet 40 milliGRAM(s) Oral before breakfast  predniSONE   Tablet 5 milliGRAM(s) Oral daily  tamsulosin 0.8 milliGRAM(s) Oral at bedtime  trimethoprim   80 mG/sulfamethoxazole 400 mG 1 Tablet(s) Oral daily  valGANciclovir 50 mG/mL Oral Solution 900 milliGRAM(s) Oral daily    MEDICATIONS  (PRN):  diphenhydrAMINE 50 milliGRAM(s) Oral <User Schedule> PRN Rash and/or Itching      PAST MEDICAL & SURGICAL HISTORY:  GIB (gastrointestinal bleeding)  GERD with esophagitis: Gastritis &amp; Non Bleeding Ulcers  Hepatic encephalopathy  Obesity  Fatty liver disease, nonalcoholic  Renal stones: 25 years ago  Hypertension  Neuropathy  Hypercholesteremia  Diabetes  S/P cholecystectomy      Vital Signs Last 24 Hrs  T(C): 36.8 (03 Aug 2020 09:10), Max: 36.9 (02 Aug 2020 12:59)  T(F): 98.2 (03 Aug 2020 09:10), Max: 98.4 (02 Aug 2020 12:59)  HR: 86 (03 Aug 2020 09:10) (77 - 100)  BP: 104/61 (03 Aug 2020 09:10) (104/61 - 149/70)  BP(mean): 76 (03 Aug 2020 09:10) (76 - 99)  RR: 20 (03 Aug 2020 09:10) (18 - 20)  SpO2: 98% (03 Aug 2020 09:10) (97% - 100%)    I&O's Summary    02 Aug 2020 07:01  -  03 Aug 2020 07:00  --------------------------------------------------------  IN: 1940 mL / OUT: 1375 mL / NET: 565 mL    03 Aug 2020 07:01  -  03 Aug 2020 10:09  --------------------------------------------------------  IN: 240 mL / OUT: 0 mL / NET: 240 mL                              8.3    3.35  )-----------( 118      ( 03 Aug 2020 06:21 )             25.1     08-03    138  |  104  |  29<H>  ----------------------------<  156<H>  4.6   |  24  |  1.19    Ca    9.0      03 Aug 2020 06:21  Phos  2.9     08-03  Mg     1.6     08-03    TPro  5.3<L>  /  Alb  3.1<L>  /  TBili  0.6  /  DBili  0.2  /  AST  20  /  ALT  22  /  AlkPhos  101  08-03          ROS      Physical Exam  Constitutional: NAD/WD/WN  Eyes: Anicteric, PERRLA  ENMT: nc/at. no thrush or ulcerations appreciated  Neck: no JVD  Respiratory: CTA B/L, unlabored breathing   Cardiovascular: RRR  Gastrointestinal: soft, NT, ND, T-tube tied off and secured  Genitourinary: Voiding spontaneously  Extremities: heel wound healing, no drainage. No edema   Vascular: Palpable dp pulses bilaterally  Neurological: AAOx2-3, slowly improving mental status overall.  more cooperative and arousable  Skin: no rashes  Musculoskeletal: Moving all extremities  Psychiatric: Responsive

## 2020-08-03 NOTE — PROGRESS NOTE ADULT - ASSESSMENT
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 c/b VRE bactermia and delirium    [] POD 31 s/p OLT   - Good graft function, LFTs stable   - Immuno:      -->Tacro DC'd 7/19. Cyclosporine DC'd 7/27.  Changed to Everolimus on 7/27 due to persistent delirium/AMS, now with improvement     -->Everolimus level from 7/30 was 4.2.  On Everolimus 3.5mg BID, MMF 750mg BID,  Pred 5mg daily.      -->Daily Everolimus and Cyclosporine levels  - PPX: Valcyte/Nystatin/Bactrim  - Mental status improving slowly.  CT Head negative. Ammonia stable, now observing off Xifaxan  - Improved oral intake.  watch Calorie Count. Carb restricted diet as tolerated  - Continue ASA 81mg daily for HAT prevention  - h/o UGI bleed: Protonix 40mg daily.  Epo x1 on 8/1  - DVT PPx: SCDs at all times  - Sacral decub: seen by  wound care, recs appreciated, appears to be healing well  - Pain control PRN    [] VRE bacteremia (7/10)  - Daptomycin completed on 7/26   - BCx from 7/20 with NG, UCx (7/20) with NG   - Appreciate ID recommendations    [] Hyperglycemia  - Appreciate Endocrinology recommendations  - continue to monitor BG with Humalog insulin SSI coverage for now (was previously on Humalog 5U premeal and Lantus 8 units qhs)  - Fingersticks ac and qhs    [] Heel ulcer  - Continue local wound care w/ santyl and DSD as per podiatry    [] Dispo:    - continue 1:1 with tele  - expect d/c home with home PT in one week if continues to progress well  - daily PT/OT (team aware)

## 2020-08-03 NOTE — PROGRESS NOTE ADULT - PROBLEM SELECTOR PLAN 3
- significantly improved today  - everolimus dosing as per transplant surgery   - CTH with slight progression of atrophic changes in comparison to prior study on 06/11  - management as per transplant services

## 2020-08-03 NOTE — PROGRESS NOTE ADULT - ASSESSMENT
64 M hx of DM, HLD, GERD, HFpEF with mild LV diastolic dysfunction, and decompensated JEAN cirrhosis c/b ascites with hx of SBP, HE, duodenal ulcer, GAVE and duodenal AVM s/p APC (last on 10/11/19), s/p liver transplant this admission (7/2/20). Post-op course c/b tacrolimus toxicity and hospital derlirium, improving after stopping CNI and replacing w/ everolimus.     Impression:  #S/p liver transplant 7/2: liver enzymes continue to downtrend  OR details:  - L groin cutdown for vv bypass   - anastomosis: bicaval end-end/CBD duct-duct/HA & PV end-end, all standard anatomy.    - IOC with good flow  - Simulect induction. 500mg Solumedrol  - JPs x3 with T-Tube  - 14U pRBC, 14U FFP, 10 PLT, 11 CRYO, 500mL returned from cell saver, EBL 5L, UOP 1100mL  Recipient Info:   ABO: A  CMV:  Neg  EBV Positive  Donor:  Donor ID:  [id number] Match: [id number]  Age: 29 ABO:  A1 High Risk:   No COD: Cardiovascular  Anti CMV positive, EBV IgG- positive  HepBcAb-neg, Hepatitis C-DANA- neg, Hepatitis C ab-neg    #Halluciniations/agitation/AMS/tremors – due to CNI toxicity, improving now that pt is off CNI and on everolimus  #Sacral decubitus ulcer: stage I, does not appear infected  #GI bleed: Resolved. Hgb stable with no overt bleeding post-transplant. 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 GAVE and PHG likely improved pos ttransplant.  #R posterior heel wound w/ overlying eschar –Local wound care. no signs of infections, XR neg for gas, evaluated by podiatry and ID. MRI w/o contrast limited, but no overt signs of active infection.  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     Recommendation:  - continue with everolimus dosed per transplant surgery, f/u levels (3.5mg BID)  - continue Cellcept dosing as per transplant surgery  - continue prednisone 5 mg PO daily  - frequent reorientation and visitation   - normalized sleep/wake cycle environment  - continue nystatin, Bactrim and Valcyte ppx  - physical therapy daily to get OOB and ambulating  - calorie count  - continue with mag oxide 200 mg BID  - monitor Hb on daily CBC - continue PO PPI BID, monitor bowel movements  - c/w ASA 81mg daily  - continue to hold diuretics  - follow up Wound care recommendations for R heel ulcer and sacral decub  - follow up other Transplant surgery recommendations       Ze Dominguez  Gastroenterology Fellow  AT NIGHT AND ON WEEKENDS:  Contact on-call GI fellow via answering service ([phone]) from 5pm-8am and on weekends/holidays  MONDAY-FRIDAY 8AM-5PM:  Available via [employer] Teams  Call [phone] ([facility]) or Page [phone] ([facility]) from 8am-5pm M-F

## 2020-08-03 NOTE — PROGRESS NOTE ADULT - SUBJECTIVE AND OBJECTIVE BOX
GASTROENTEROLOGY    Patient seen and examined at bedside  resting comfortably in bed  mentation appears significantly improved  tolerating diet         MEDICATIONS  (STANDING):  aspirin enteric coated 81 milliGRAM(s) Oral daily  chlorhexidine 2% Cloths 1 Application(s) Topical <User Schedule>  collagenase Ointment 1 Application(s) Topical daily  diphenhydrAMINE 50 milliGRAM(s) Oral at bedtime  everolimus (ZORTRESS) 3.5 milliGRAM(s) Oral <User Schedule>  insulin lispro (HumaLOG) corrective regimen sliding scale   SubCutaneous Before meals and at bedtime  magnesium oxide 800 milliGRAM(s) Oral two times a day with meals  mycophenolate mofetil 750 milliGRAM(s) Oral <User Schedule>  nystatin    Suspension 079813 Unit(s) Oral four times a day  nystatin Cream 1 Application(s) Topical two times a day  pantoprazole    Tablet 40 milliGRAM(s) Oral before breakfast  predniSONE   Tablet 5 milliGRAM(s) Oral daily  tamsulosin 0.8 milliGRAM(s) Oral at bedtime  trimethoprim   80 mG/sulfamethoxazole 400 mG 1 Tablet(s) Oral daily  valGANciclovir 50 mG/mL Oral Solution 900 milliGRAM(s) Oral daily        T(F): 98.1 (08-02-20 @ 05:13), Max: 98.3 (08-01-20 @ 09:00)  HR: 75 (08-02-20 @ 05:13) (75 - 85)  BP: 133/64 (08-02-20 @ 05:13) (103/55 - 136/67)  RR: 18 (08-02-20 @ 05:13) (18 - 18)  SpO2: 98% (08-02-20 @ 05:13) (96% - 100%)  Wt(kg): --    PHYSICAL EXAM  GENERAL:   NAD  HEENT:  NC/AT, no JVD, sclera-anicteric  ABDOMEN:  Soft, non-distended, (+) bowel sounds, incision c/d/i  EXTREMITIES:  + peripheral edema  NEURO:  more alert, awake. Less tremulous   SKIN:  No rash/warm/dry      LABS:                        8.6<L>  3.18<L> )-----------( 114<L>    ( 02 Aug 2020 06:17 )             25.8<L>  02 Aug 2020 06:17    08-02    139  |  106  |  31<H>  ----------------------------<  162<H>  4.7   |  21<L>  |  1.09    Ca    9.1      02 Aug 2020 06:17  Phos  3.2     08-02  Mg     1.7     08-02    TPro  5.3<L>  /  Alb  3.2<L>  /  TBili  0.6  /  DBili  0.2  /  AST  15  /  ALT  23  /  AlkPhos  102  08-02    LIVER FUNCTIONS - ( 02 Aug 2020 06:17 )  Alb: 3.2 g/dL / Pro: 5.3 g/dL / ALK PHOS: 102 U/L / ALT: 23 U/L / AST: 15 U/L / GGT: x           PT/INR - ( 02 Aug 2020 06:17 )   PT: 12.4 sec;   INR: 1.04 ratio         PTT - ( 02 Aug 2020 06:17 )  PTT:29.2 sec  I&O's Detail    01 Aug 2020 07:01  -  02 Aug 2020 07:00  --------------------------------------------------------  IN:    Oral Fluid: 1500 mL    Solution: 100 mL  Total IN: 1600 mL    OUT:    Voided: 800 mL  Total OUT: 800 mL    Total NET: 800 mL

## 2020-08-04 LAB
ALBUMIN SERPL ELPH-MCNC: 3.2 G/DL — LOW (ref 3.3–5)
ALP SERPL-CCNC: 121 U/L — HIGH (ref 40–120)
ALT FLD-CCNC: 24 U/L — SIGNIFICANT CHANGE UP (ref 10–45)
AMMONIA BLD-MCNC: 46 UMOL/L — SIGNIFICANT CHANGE UP (ref 11–55)
ANION GAP SERPL CALC-SCNC: 9 MMOL/L — SIGNIFICANT CHANGE UP (ref 5–17)
APTT BLD: 29.7 SEC — SIGNIFICANT CHANGE UP (ref 27.5–35.5)
AST SERPL-CCNC: 17 U/L — SIGNIFICANT CHANGE UP (ref 10–40)
BILIRUB DIRECT SERPL-MCNC: 0.2 MG/DL — SIGNIFICANT CHANGE UP (ref 0–0.2)
BILIRUB INDIRECT FLD-MCNC: 0.3 MG/DL — SIGNIFICANT CHANGE UP (ref 0.2–1)
BILIRUB SERPL-MCNC: 0.5 MG/DL — SIGNIFICANT CHANGE UP (ref 0.2–1.2)
BUN SERPL-MCNC: 31 MG/DL — HIGH (ref 7–23)
CALCIUM SERPL-MCNC: 9 MG/DL — SIGNIFICANT CHANGE UP (ref 8.4–10.5)
CHLORIDE SERPL-SCNC: 103 MMOL/L — SIGNIFICANT CHANGE UP (ref 96–108)
CO2 SERPL-SCNC: 23 MMOL/L — SIGNIFICANT CHANGE UP (ref 22–31)
CREAT SERPL-MCNC: 1.19 MG/DL — SIGNIFICANT CHANGE UP (ref 0.5–1.3)
CYCLOSPORINE SER-MCNC: <30 NG/ML — LOW (ref 150–400)
EVEROLIMUS, WHOLE BLOOD RESULT: 5.7 NG/ML — SIGNIFICANT CHANGE UP (ref 3–8)
GLUCOSE BLDC GLUCOMTR-MCNC: 212 MG/DL — HIGH (ref 70–99)
GLUCOSE BLDC GLUCOMTR-MCNC: 223 MG/DL — HIGH (ref 70–99)
GLUCOSE BLDC GLUCOMTR-MCNC: 253 MG/DL — HIGH (ref 70–99)
GLUCOSE BLDC GLUCOMTR-MCNC: 265 MG/DL — HIGH (ref 70–99)
GLUCOSE SERPL-MCNC: 261 MG/DL — HIGH (ref 70–99)
HCT VFR BLD CALC: 26.2 % — LOW (ref 39–50)
HGB BLD-MCNC: 8.7 G/DL — LOW (ref 13–17)
INR BLD: 1.04 RATIO — SIGNIFICANT CHANGE UP (ref 0.88–1.16)
MAGNESIUM SERPL-MCNC: 1.8 MG/DL — SIGNIFICANT CHANGE UP (ref 1.6–2.6)
MCHC RBC-ENTMCNC: 32.1 PG — SIGNIFICANT CHANGE UP (ref 27–34)
MCHC RBC-ENTMCNC: 33.2 GM/DL — SIGNIFICANT CHANGE UP (ref 32–36)
MCV RBC AUTO: 96.7 FL — SIGNIFICANT CHANGE UP (ref 80–100)
MISCELLANEOUS TEST NAME: SIGNIFICANT CHANGE UP
NRBC # BLD: 0 /100 WBCS — SIGNIFICANT CHANGE UP (ref 0–0)
PHOSPHATE SERPL-MCNC: 2.7 MG/DL — SIGNIFICANT CHANGE UP (ref 2.5–4.5)
PLATELET # BLD AUTO: 113 K/UL — LOW (ref 150–400)
POTASSIUM SERPL-MCNC: 4.9 MMOL/L — SIGNIFICANT CHANGE UP (ref 3.5–5.3)
POTASSIUM SERPL-SCNC: 4.9 MMOL/L — SIGNIFICANT CHANGE UP (ref 3.5–5.3)
PROCALCITONIN SERPL-MCNC: 0.18 NG/ML — HIGH (ref 0.02–0.1)
PROT SERPL-MCNC: 5.5 G/DL — LOW (ref 6–8.3)
PROTHROM AB SERPL-ACNC: 12.4 SEC — SIGNIFICANT CHANGE UP (ref 10.6–13.6)
RBC # BLD: 2.71 M/UL — LOW (ref 4.2–5.8)
RBC # FLD: 17.8 % — HIGH (ref 10.3–14.5)
SODIUM SERPL-SCNC: 135 MMOL/L — SIGNIFICANT CHANGE UP (ref 135–145)
WBC # BLD: 3.46 K/UL — LOW (ref 3.8–10.5)
WBC # FLD AUTO: 3.46 K/UL — LOW (ref 3.8–10.5)

## 2020-08-04 PROCEDURE — 99232 SBSQ HOSP IP/OBS MODERATE 35: CPT | Mod: GC

## 2020-08-04 PROCEDURE — 99232 SBSQ HOSP IP/OBS MODERATE 35: CPT | Mod: GC,24

## 2020-08-04 PROCEDURE — 99232 SBSQ HOSP IP/OBS MODERATE 35: CPT

## 2020-08-04 RX ORDER — INSULIN GLARGINE 100 [IU]/ML
100 INJECTION, SOLUTION SUBCUTANEOUS
Qty: 1 | Refills: 3 | Status: DISCONTINUED | COMMUNITY
Start: 2020-07-21 | End: 2020-08-04

## 2020-08-04 RX ORDER — INSULIN GLARGINE 100 [IU]/ML
5 INJECTION, SOLUTION SUBCUTANEOUS AT BEDTIME
Refills: 0 | Status: DISCONTINUED | OUTPATIENT
Start: 2020-08-04 | End: 2020-08-07

## 2020-08-04 RX ORDER — QUETIAPINE FUMARATE 25 MG/1
25 TABLET ORAL
Qty: 90 | Refills: 1 | Status: DISCONTINUED | COMMUNITY
Start: 2020-07-21 | End: 2020-08-04

## 2020-08-04 RX ORDER — VALGANCICLOVIR 450 MG/1
900 TABLET, FILM COATED ORAL DAILY
Refills: 0 | Status: DISCONTINUED | OUTPATIENT
Start: 2020-08-04 | End: 2020-08-11

## 2020-08-04 RX ORDER — CYCLOSPORINE 100 MG/1
100 CAPSULE, LIQUID FILLED ORAL
Qty: 180 | Refills: 3 | Status: DISCONTINUED | COMMUNITY
Start: 2020-07-21 | End: 2020-08-04

## 2020-08-04 RX ORDER — CYCLOSPORINE 50 MG/1
50 CAPSULE, LIQUID FILLED ORAL TWICE DAILY
Qty: 180 | Refills: 0 | Status: DISCONTINUED | COMMUNITY
Start: 2020-07-21 | End: 2020-08-04

## 2020-08-04 RX ORDER — CYCLOSPORINE 25 MG/1
25 CAPSULE, LIQUID FILLED ORAL TWICE DAILY
Qty: 180 | Refills: 3 | Status: DISCONTINUED | COMMUNITY
Start: 2020-07-21 | End: 2020-08-04

## 2020-08-04 RX ORDER — MELATONIN 3 MG
3 CAPSULE ORAL
Qty: 90 | Refills: 1 | Status: DISCONTINUED | COMMUNITY
Start: 2020-07-21 | End: 2020-08-04

## 2020-08-04 RX ADMIN — Medication 2: at 12:35

## 2020-08-04 RX ADMIN — EVEROLIMUS 3.5 MILLIGRAM(S): 10 TABLET ORAL at 08:43

## 2020-08-04 RX ADMIN — Medication 3: at 17:27

## 2020-08-04 RX ADMIN — TAMSULOSIN HYDROCHLORIDE 0.8 MILLIGRAM(S): 0.4 CAPSULE ORAL at 22:27

## 2020-08-04 RX ADMIN — PANTOPRAZOLE SODIUM 40 MILLIGRAM(S): 20 TABLET, DELAYED RELEASE ORAL at 05:44

## 2020-08-04 RX ADMIN — INSULIN GLARGINE 5 UNIT(S): 100 INJECTION, SOLUTION SUBCUTANEOUS at 22:27

## 2020-08-04 RX ADMIN — Medication 5 MILLIGRAM(S): at 05:43

## 2020-08-04 RX ADMIN — Medication 500000 UNIT(S): at 00:13

## 2020-08-04 RX ADMIN — Medication 81 MILLIGRAM(S): at 11:47

## 2020-08-04 RX ADMIN — NYSTATIN CREAM 1 APPLICATION(S): 100000 CREAM TOPICAL at 05:41

## 2020-08-04 RX ADMIN — Medication 500000 UNIT(S): at 23:47

## 2020-08-04 RX ADMIN — Medication 1 TABLET(S): at 11:47

## 2020-08-04 RX ADMIN — MAGNESIUM OXIDE 400 MG ORAL TABLET 800 MILLIGRAM(S): 241.3 TABLET ORAL at 17:27

## 2020-08-04 RX ADMIN — Medication 500000 UNIT(S): at 11:47

## 2020-08-04 RX ADMIN — Medication 3: at 22:28

## 2020-08-04 RX ADMIN — Medication 1 APPLICATION(S): at 11:49

## 2020-08-04 RX ADMIN — Medication 500000 UNIT(S): at 17:27

## 2020-08-04 RX ADMIN — Medication 3: at 00:05

## 2020-08-04 RX ADMIN — CHLORHEXIDINE GLUCONATE 1 APPLICATION(S): 213 SOLUTION TOPICAL at 06:02

## 2020-08-04 RX ADMIN — MYCOPHENOLATE MOFETIL 750 MILLIGRAM(S): 250 CAPSULE ORAL at 22:27

## 2020-08-04 RX ADMIN — VALGANCICLOVIR 900 MILLIGRAM(S): 450 TABLET, FILM COATED ORAL at 11:54

## 2020-08-04 RX ADMIN — Medication 2: at 08:41

## 2020-08-04 RX ADMIN — MAGNESIUM OXIDE 400 MG ORAL TABLET 800 MILLIGRAM(S): 241.3 TABLET ORAL at 08:41

## 2020-08-04 RX ADMIN — EVEROLIMUS 3.5 MILLIGRAM(S): 10 TABLET ORAL at 22:29

## 2020-08-04 RX ADMIN — MYCOPHENOLATE MOFETIL 750 MILLIGRAM(S): 250 CAPSULE ORAL at 08:51

## 2020-08-04 RX ADMIN — Medication 500000 UNIT(S): at 05:41

## 2020-08-04 NOTE — PROGRESS NOTE ADULT - PROBLEM SELECTOR PLAN 3
- improved   - everolimus dosing as per transplant surgery   - CTH with slight progression of atrophic changes in comparison to prior study on 06/11  - management as per transplant services

## 2020-08-04 NOTE — PROGRESS NOTE ADULT - ASSESSMENT
64 M hx of DM, HLD, GERD, HFpEF with mild LV diastolic dysfunction, and decompensated JEAN cirrhosis c/b ascites with hx of SBP, HE, duodenal ulcer, GAVE and duodenal AVM s/p APC (last on 10/11/19), s/p liver transplant this admission (7/2/20). Post-op course c/b tacrolimus toxicity and hospital derlirium, improving after stopping CNI and replacing w/ everolimus.     Impression:  #S/p liver transplant 7/2: liver enzymes continue to downtrend  OR details:  - L groin cutdown for vv bypass   - anastomosis: bicaval end-end/CBD duct-duct/HA & PV end-end, all standard anatomy.    - IOC with good flow  - Simulect induction. 500mg Solumedrol  - JPs x3 with T-Tube  - 14U pRBC, 14U FFP, 10 PLT, 11 CRYO, 500mL returned from cell saver, EBL 5L, UOP 1100mL  Recipient Info:   ABO: A  CMV:  Neg  EBV Positive  Donor:  Donor ID:  [id number] Match: [id number]  Age: 29 ABO:  A1 High Risk:   No COD: Cardiovascular  Anti CMV positive, EBV IgG- positive  HepBcAb-neg, Hepatitis C-DANA- neg, Hepatitis C ab-neg    #Halluciniations/agitation/AMS/tremors – due to CNI toxicity, improving now that pt is off CNI and on everolimus  #Sacral decubitus ulcer: stage I, does not appear infected  #GI bleed: Resolved. Hgb stable with no overt bleeding post-transplant. 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 GAVE and PHG likely improved pos ttransplant.  #R posterior heel wound w/ overlying eschar –Local wound care. no signs of infections, XR neg for gas, evaluated by podiatry and ID. MRI w/o contrast limited, but no overt signs of active infection.  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     Recommendation:  - continue with everolimus dosed per transplant surgery, f/u levels (3.5mg BID)  - continue Cellcept dosing as per transplant surgery  - continue prednisone 5 mg PO daily  - frequent reorientation and visitation   - normalized sleep/wake cycle environment  - continue nystatin, Bactrim and Valcyte ppx  - physical therapy daily to get OOB and ambulating  - continue with mag oxide 200 mg BID  - monitor Hb on daily CBC - continue PO PPI BID, monitor bowel movements  - c/w ASA 81mg daily  - continue to hold diuretics  - follow up Wound care recommendations for R heel ulcer and sacral decub  - follow up other Transplant surgery recommendations       Ze Dominguez  Gastroenterology Fellow  AT NIGHT AND ON WEEKENDS:  Contact on-call GI fellow via answering service ([phone]) from 5pm-8am and on weekends/holidays  MONDAY-FRIDAY 8AM-5PM:  Available via [employer] Teams  Call [phone] ([facility]) or Page [phone] ([facility]) from 8am-5pm M-F

## 2020-08-04 NOTE — PROGRESS NOTE ADULT - SUBJECTIVE AND OBJECTIVE BOX
GASTROENTEROLOGY    Patient seen and examined earlier this afternoon  mentation continues to improve  tolerating diet without nausea, vomiting, or abdominal pain        MEDICATIONS  (STANDING):  aspirin enteric coated 81 milliGRAM(s) Oral daily  chlorhexidine 2% Cloths 1 Application(s) Topical <User Schedule>  collagenase Ointment 1 Application(s) Topical daily  diphenhydrAMINE 50 milliGRAM(s) Oral at bedtime  everolimus (ZORTRESS) 3.5 milliGRAM(s) Oral <User Schedule>  insulin lispro (HumaLOG) corrective regimen sliding scale   SubCutaneous Before meals and at bedtime  magnesium oxide 800 milliGRAM(s) Oral two times a day with meals  mycophenolate mofetil 750 milliGRAM(s) Oral <User Schedule>  nystatin    Suspension 832122 Unit(s) Oral four times a day  nystatin Cream 1 Application(s) Topical two times a day  pantoprazole    Tablet 40 milliGRAM(s) Oral before breakfast  predniSONE   Tablet 5 milliGRAM(s) Oral daily  tamsulosin 0.8 milliGRAM(s) Oral at bedtime  trimethoprim   80 mG/sulfamethoxazole 400 mG 1 Tablet(s) Oral daily  valGANciclovir 50 mG/mL Oral Solution 900 milliGRAM(s) Oral daily        T(F): 98.1 (08-02-20 @ 05:13), Max: 98.3 (08-01-20 @ 09:00)  HR: 75 (08-02-20 @ 05:13) (75 - 85)  BP: 133/64 (08-02-20 @ 05:13) (103/55 - 136/67)  RR: 18 (08-02-20 @ 05:13) (18 - 18)  SpO2: 98% (08-02-20 @ 05:13) (96% - 100%)  Wt(kg): --    PHYSICAL EXAM  GENERAL:   NAD  HEENT:  NC/AT, no JVD, sclera-anicteric  ABDOMEN:  Soft, non-distended, (+) bowel sounds, incision c/d/i  EXTREMITIES:  + peripheral edema  NEURO:  more alert, awake. Less tremulous   SKIN:  No rash/warm/dry      LABS:                        8.6<L>  3.18<L> )-----------( 114<L>    ( 02 Aug 2020 06:17 )             25.8<L>  02 Aug 2020 06:17    08-02    139  |  106  |  31<H>  ----------------------------<  162<H>  4.7   |  21<L>  |  1.09    Ca    9.1      02 Aug 2020 06:17  Phos  3.2     08-02  Mg     1.7     08-02    TPro  5.3<L>  /  Alb  3.2<L>  /  TBili  0.6  /  DBili  0.2  /  AST  15  /  ALT  23  /  AlkPhos  102  08-02    LIVER FUNCTIONS - ( 02 Aug 2020 06:17 )  Alb: 3.2 g/dL / Pro: 5.3 g/dL / ALK PHOS: 102 U/L / ALT: 23 U/L / AST: 15 U/L / GGT: x           PT/INR - ( 02 Aug 2020 06:17 )   PT: 12.4 sec;   INR: 1.04 ratio         PTT - ( 02 Aug 2020 06:17 )  PTT:29.2 sec  I&O's Detail    01 Aug 2020 07:01  -  02 Aug 2020 07:00  --------------------------------------------------------  IN:    Oral Fluid: 1500 mL    Solution: 100 mL  Total IN: 1600 mL    OUT:    Voided: 800 mL  Total OUT: 800 mL    Total NET: 800 mL

## 2020-08-04 NOTE — PROGRESS NOTE ADULT - SUBJECTIVE AND OBJECTIVE BOX
TRANSPLANT SURGERY MULTIDISCIPLINARY NOTE    OLTx      Date:  7/2/2020       POD #32    Present: Patient seen and examined with multidisciplinary team including Transplant Surgeon Dr. Chris/Clinton,  Nephrologist Dr. Patel, Transplant NP: Oswald and Resident Barrett. Disciplines not in attendance will be notified of the plan.    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     Re-admitted on 6/11:   ·	worsening HE  ·	UTI/VRE: tx with linezolid 6/15-22, bactrim DS 6/22 - 7/2  ·	MISA/Hyponatremia  ·	UGI bleed (melena) s/p EGD (6/22) c/w hemorrhagic duodenitis/gastritis; Grade 2 EV; requiring multiple transfusions  ·	Known h/o R foot ulcer (MRI neg for OM)  ·	s/p OLTx on 7/2/2020, post op liver doppler with patent vessels  ·	post op course c/b delirium and VRE bacteremia (7/10), now off ABX, off FK and off Cyclo. Improving on Everolimus    Interval events:  - No acute overnight events  - good graft function;  LFTs stable  - Significant improvement in mental status after medication adjustment  - Improving oral intake on Calorie count  - having bowel function with good UO  - ambulating today with staff and PT  - Elevated fingersticks currently on sliding scale/ bedtime coverage    Potential Discharge date: pending clinical improvement     Education:  Medications    Plan of care:  See Below        ROS      Physical Exam  Constitutional: NAD/WD/WN  Eyes: Anicteric, PERRLA  ENMT: nc/at. no thrush or ulcerations appreciated  Neck: no JVD, supple  Respiratory: CTA B/L, unlabored breathing   Cardiovascular: RRR  Gastrointestinal: Soft abdomen, non tender to touch at surgical site, ND  Genitourinary: Voiding spontaneously  Extremities: SCD's in place and working bilaterally  Vascular: Palpable dp pulses bilaterally  Neurological: A&O x2-3 intermittently confused to time reorientated easily   Skin: Wound open to air no erythema and evidence of infection noted  Musculoskeletal: Moving all extremities  Psychiatric: Responsive              MEDICATIONS  (STANDING):  aspirin enteric coated 81 milliGRAM(s) Oral daily  chlorhexidine 2% Cloths 1 Application(s) Topical <User Schedule>  collagenase Ointment 1 Application(s) Topical daily  everolimus (ZORTRESS) 3.5 milliGRAM(s) Oral <User Schedule>  insulin glargine Injectable (LANTUS) 5 Unit(s) SubCutaneous at bedtime  insulin lispro (HumaLOG) corrective regimen sliding scale   SubCutaneous Before meals and at bedtime  magnesium oxide 800 milliGRAM(s) Oral two times a day with meals  mycophenolate mofetil 750 milliGRAM(s) Oral <User Schedule>  nystatin    Suspension 482016 Unit(s) Oral four times a day  nystatin Cream 1 Application(s) Topical two times a day  pantoprazole    Tablet 40 milliGRAM(s) Oral before breakfast  predniSONE   Tablet 5 milliGRAM(s) Oral daily  tamsulosin 0.8 milliGRAM(s) Oral at bedtime  trimethoprim   80 mG/sulfamethoxazole 400 mG 1 Tablet(s) Oral daily  valGANciclovir 900 milliGRAM(s) Oral daily    MEDICATIONS  (PRN):  diphenhydrAMINE 50 milliGRAM(s) Oral <User Schedule> PRN Rash and/or Itching      PAST MEDICAL & SURGICAL HISTORY:  GIB (gastrointestinal bleeding)  GERD with esophagitis: Gastritis &amp; Non Bleeding Ulcers  Hepatic encephalopathy  Obesity  Fatty liver disease, nonalcoholic  Renal stones: 25 years ago  Hypertension  Neuropathy  Hypercholesteremia  Diabetes  S/P cholecystectomy      Vital Signs Last 24 Hrs  T(C): 36.9 (04 Aug 2020 09:00), Max: 36.9 (03 Aug 2020 17:04)  T(F): 98.4 (04 Aug 2020 09:00), Max: 98.4 (03 Aug 2020 17:04)  HR: 91 (04 Aug 2020 09:24) (83 - 92)  BP: 131/62 (04 Aug 2020 09:00) (118/58 - 139/69)  BP(mean): 82 (04 Aug 2020 05:33) (82 - 85)  RR: 18 (04 Aug 2020 09:00) (18 - 18)  SpO2: 99% (04 Aug 2020 09:24) (96% - 100%)    I&O's Summary    03 Aug 2020 07:01  -  04 Aug 2020 07:00  --------------------------------------------------------  IN: 885 mL / OUT: 2700 mL / NET: -1815 mL    04 Aug 2020 07:01  -  04 Aug 2020 13:04  --------------------------------------------------------  IN: 320 mL / OUT: 300 mL / NET: 20 mL          LABS:                        8.7    3.46  )-----------( 113      ( 04 Aug 2020 06:05 )             26.2     08-04    135  |  103  |  31<H>  ----------------------------<  261<H>  4.9   |  23  |  1.19    Ca    9.0      04 Aug 2020 06:05  Phos  2.7     08-04  Mg     1.8     08-04    TPro  5.5<L>  /  Alb  3.2<L>  /  TBili  0.5  /  DBili  0.2  /  AST  17  /  ALT  24  /  AlkPhos  121<H>  08-04    PT/INR - ( 04 Aug 2020 06:05 )   PT: 12.4 sec;   INR: 1.04 ratio         PTT - ( 04 Aug 2020 06:05 )  PTT:29.7 sec    CAPILLARY BLOOD GLUCOSE      POCT Blood Glucose.: 212 mg/dL (04 Aug 2020 12:23)  POCT Blood Glucose.: 223 mg/dL (04 Aug 2020 08:22)  POCT Blood Glucose.: 258 mg/dL (03 Aug 2020 23:09)  POCT Blood Glucose.: 246 mg/dL (03 Aug 2020 17:39)    LIVER FUNCTIONS - ( 04 Aug 2020 06:05 )  Alb: 3.2 g/dL / Pro: 5.5 g/dL / ALK PHOS: 121 U/L / ALT: 24 U/L / AST: 17 U/L / GGT: x                 Cultures:  Gastrointestinal: soft, NT, ND, T-tube tied off and secured  Genitourinary: Voiding spontaneously  Extremities: heel wound healing, no drainage. No edema   Vascular: Palpable dp pulses bilaterally  Neurological: AAOx2-3, slowly improving mental status overall.  more cooperative and arousable  Skin: no rashes  Musculoskeletal: Moving all extremities  Psychiatric: Responsive

## 2020-08-04 NOTE — PROGRESS NOTE ADULT - SUBJECTIVE AND OBJECTIVE BOX
Chief Complaint:  Patient is a 64y old  Male who presents with a chief complaint of Liver failure, hepatic encephalopathy (03 Aug 2020 12:36)    Reason for consult: s/p OLT    Interval Events: Pt doing much better, has no complaints.     Hospital Medications:  aspirin enteric coated 81 milliGRAM(s) Oral daily  chlorhexidine 2% Cloths 1 Application(s) Topical <User Schedule>  collagenase Ointment 1 Application(s) Topical daily  diphenhydrAMINE 50 milliGRAM(s) Oral <User Schedule> PRN  everolimus (ZORTRESS) 3.5 milliGRAM(s) Oral <User Schedule>  insulin lispro (HumaLOG) corrective regimen sliding scale   SubCutaneous Before meals and at bedtime  magnesium oxide 800 milliGRAM(s) Oral two times a day with meals  mycophenolate mofetil 750 milliGRAM(s) Oral <User Schedule>  nystatin    Suspension 071986 Unit(s) Oral four times a day  nystatin Cream 1 Application(s) Topical two times a day  pantoprazole    Tablet 40 milliGRAM(s) Oral before breakfast  predniSONE   Tablet 5 milliGRAM(s) Oral daily  tamsulosin 0.8 milliGRAM(s) Oral at bedtime  trimethoprim   80 mG/sulfamethoxazole 400 mG 1 Tablet(s) Oral daily  valGANciclovir 900 milliGRAM(s) Oral daily      ROS:   General:  No  fevers, chills, night sweats, fatigue  Eyes:  Good vision, no reported pain  ENT:  No sore throat, pain, runny nose  CV:  No pain, palpitations  Pulm:  No dyspnea, cough  GI:  See HPI, otherwise negative  :  No  incontinence, nocturia  Muscle:  No pain, weakness  Neuro:  No memory problems  Psych:  No insomnia, mood problems, depression  Endocrine:  No polyuria, polydipsia, cold/heat intolerance  Heme:  No petechiae, ecchymosis, easy bruisability  Skin:  No rash    PHYSICAL EXAM:   Vital Signs:  Vital Signs Last 24 Hrs  T(C): 36.8 (04 Aug 2020 05:33), Max: 36.9 (03 Aug 2020 17:04)  T(F): 98.3 (04 Aug 2020 05:33), Max: 98.4 (03 Aug 2020 17:04)  HR: 91 (04 Aug 2020 05:39) (83 - 92)  BP: 118/58 (04 Aug 2020 05:33) (118/58 - 152/68)  BP(mean): 82 (04 Aug 2020 05:33) (82 - 98)  RR: 18 (04 Aug 2020 05:33) (18 - 20)  SpO2: 100% (04 Aug 2020 05:39) (96% - 100%)  Daily     Daily Weight in k (04 Aug 2020 05:33)    GENERAL: no acute distress  NEURO: alert, no asterixis, oriented x 2  HEENT: anicteric sclera, no conjunctival pallor appreciated  CHEST: no respiratory distress, no accessory muscle use  CARDIAC: regular rate, rhythm  ABDOMEN: soft, non-tender, non-distended, no rebound or guarding  EXTREMITIES: warm, well perfused, no edema  SKIN: no lesions noted    LABS: reviewed                        8.7    3.46  )-----------( 113      ( 04 Aug 2020 06:05 )             26.2     08-04    135  |  103  |  31<H>  ----------------------------<  261<H>  4.9   |  23  |  1.19    Ca    9.0      04 Aug 2020 06:05  Phos  2.7     08-04  Mg     1.8     08-04    TPro  5.5<L>  /  Alb  3.2<L>  /  TBili  0.5  /  DBili  0.2  /  AST  17  /  ALT  24  /  AlkPhos  121<H>  08-04    LIVER FUNCTIONS - ( 04 Aug 2020 06:05 )  Alb: 3.2 g/dL / Pro: 5.5 g/dL / ALK PHOS: 121 U/L / ALT: 24 U/L / AST: 17 U/L / GGT: x             Interval Diagnostic Studies: see sunrise for full report

## 2020-08-04 NOTE — PROGRESS NOTE ADULT - ASSESSMENT
63 y/o M PMHx decompensated JEAN Cirrhosis on transplant list, DMII, HFpEF, recent admission for ESBL E coli prostatic abscess 2/2 4 wks ertapenem, hx of ESBL UTIs with prostate abscess s/p IV abx, recent VRE urinary colonization came to hospital for worsening jaundice and episode of confusion, resolved PTA. s/p treatment course for possible VRE UTI. Now s/p OLT on 7/2/20. Course complicated by Encephalopathy and Tremors. Blood Cultures (7/10) with VRE sensitive to Linezolid. CT C/A/P without obvious intraabdominal source. Hypoxia prior to positive BCx which may be related to sepsis CT Chest (7/11) without evidence of pneumonia. Had L groin fluid collection which appears to be consistent with seroma - sample obtained for culture also negative. No drainage, erythema or underlying fluctuance was appreciated at the right heel. MRI prior to transplant without obvious infection. Transferred from [facility] to [facility] on 7/17/2020.    Currently on:  nystatin    Suspension 490613 four times a day  trimethoprim   80 mG/sulfamethoxazole 400 mG 1 daily  valGANciclovir 900 daily    #Encephalopathy (improving)  - Switched to Everolimus on 7/27 to avoid CNI CNS Toxicity   - CT result(7/31): Slight progression to atrophic changes compared with 6/11/2020 which may be the result of treatment-related changes. No other significant pathology  - Continue to monitor    #VRE Bacteremia, L Groin Collection, Encephalopathy (improving)  - s/p Linezolid and Daptomycin ended on 7/26 for VRE bacteremia post-OLT  - Abd US showed 9cm Left groin collection: likely represents a seroma or lymphocele; aspirated - cx: PMN seen, no organism seen  - Continue monitoring cylosporine levels  - TTE negative for endocarditis  - UA/UC on 7/20 no UTI; UA on 7/30 negative  - BCx on 7/20 NGTD    #s/p OLT   - Continue bactrim, valcyte (CMV D+/R-) for prophylaxis  - Monitor patient for tremors - was on tacrolimus(CI), then cylosporine(CI), now on Everolimus(mTor I)    #Right heel wound  --Podiatry on board    --Local wound care     I will continue to follow. Please feel free to contact me with any further questions.    Eusebio Pérez M.D.  [facility] Division of Infectious Disease  8AM-5PM: Pager Number [phone]  After Hours (or if no response): Please contact the Infectious Diseases Office at [phone]     The above assessment and plan were discussed with transplant surgery Flora ALDRIDGE

## 2020-08-04 NOTE — PROGRESS NOTE ADULT - ASSESSMENT
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 c/b VRE bactermia and delirium    [] POD 32 s/p OLT   - Good graft function, LFTs stable   - Immuno:      -->Tacro DC'd 7/19. Cyclosporine DC'd 7/27.  Changed to Everolimus on 7/27 due to persistent delirium/AMS, now with improvement     -->Everolimus level from 8/1 5.7 and 7/30 was 4.2.  On Everolimus 3.5mg BID, MMF 750mg BID,  Pred 5mg daily.      -->Daily Everolimus and Cyclosporine levels  - PPX: Valcyte/Nystatin/Bactrim  - Mental status improving slowly.  CT Head negative. Ammonia stable, now observing off Xifaxan  - Improved oral intake.  Watch Calorie Count. Carb restricted diet as tolerated  - Continue ASA 81mg daily for HAT prevention  - h/o UGI bleed: Protonix 40mg daily.  Epo x1 on 8/1  - DVT PPx: SCDs at all times  - Sacral decub: seen by  wound care, recs appreciated, appears to be healing well  - Pain control PRN    [] VRE bacteremia (7/10)  - Daptomycin completed on 7/26   - BCx from 7/20 with NG, UCx (7/20) with NG   - Appreciate ID recommendations    [] Hyperglycemia  - Appreciate Endocrinology recommendations  - start lantus 5 units QHS  - continue to monitor BG with Humalog insulin SSI coverage for now (was previously on Humalog 5U premeal and Lantus 8 units qhs)  - Fingersticks ac and qhs    [] Heel ulcer  - Continue local wound care w/ santyl and DSD as per podiatry    [] Dispo:    - continue 1:1 with tele  - expect d/c home with home PT in one week if continues to progress well  - daily PT/OT (team aware)  - discharge planning in progress w/ Liver transplant

## 2020-08-04 NOTE — PROGRESS NOTE ADULT - SUBJECTIVE AND OBJECTIVE BOX
Follow Up:  encephalopathy, s/p OLT     Interval History: more alert today. denies n/v/d or abdominal pain.    REVIEW OF SYSTEMS  [  ] ROS unobtainable because:    [x  ] All other systems negative except as noted below    Constitutional:  [ ] fever [ ] chills  [ ] weight loss  [ ] weakness  Skin:  [ ] rash [ ] phlebitis	  Eyes: [ ] icterus [ ] pain  [ ] discharge	  ENMT: [ ] sore throat  [ ] thrush [ ] ulcers [ ] exudates  Respiratory: [ ] dyspnea [ ] hemoptysis [ ] cough [ ] sputum	  Cardiovascular:  [ ] chest pain [ ] palpitations [ ] edema	  Gastrointestinal:  [ ] nausea [ ] vomiting [ ] diarrhea [ ] constipation [ ] pain	  Genitourinary:  [ ] dysuria [ ] frequency [ ] hematuria [ ] discharge [ ] flank pain  [ ] incontinence  Musculoskeletal:  [ ] myalgias [ ] arthralgias [ ] arthritis  [ ] back pain  Neurological:  [ ] headache [ ] seizures  [ ] confusion/altered mental status    Allergies  codeine (Anaphylaxis)        ANTIMICROBIALS:  nystatin    Suspension [id number] four times a day  trimethoprim   80 mG/sulfamethoxazole 400 mG 1 daily  valGANciclovir 900 daily      OTHER MEDS:  MEDICATIONS  (STANDING):  aspirin enteric coated 81 daily  diphenhydrAMINE 50 <User Schedule> PRN  everolimus (ZORTRESS) 3.5 <User Schedule>  insulin glargine Injectable (LANTUS) 5 at bedtime  insulin lispro (HumaLOG) corrective regimen sliding scale  Before meals and at bedtime  mycophenolate mofetil 750 <User Schedule>  pantoprazole    Tablet 40 before breakfast  predniSONE   Tablet 5 daily  tamsulosin 0.8 at bedtime      Vital Signs Last 24 Hrs  T(C): 36.8 (04 Aug 2020 13:04), Max: 36.9 (03 Aug 2020 17:04)  T(F): 98.3 (04 Aug 2020 13:04), Max: 98.4 (03 Aug 2020 17:04)  HR: 86 (04 Aug 2020 13:04) (83 - 92)  BP: 121/61 (04 Aug 2020 13:04) (118/58 - 139/69)  BP(mean): 82 (04 Aug 2020 05:33) (82 - 85)  RR: 18 (04 Aug 2020 09:00) (18 - 18)  SpO2: 99% (04 Aug 2020 09:24) (96% - 100%)    PHYSICAL EXAMINATION:  General: Alert and Awake, NAD  HEENT: PERRL, EOMI  Neck: Supple  Cardiac: RRR, No M/R/G  Resp: CTAB, No Wh/Rh/Ra  Abdomen: Surgical site from OLT is clean, without erythema and without drainage. NBS, NT/ND, No HSM, No rigidity or guarding  MSK: No LE edema. No Calf tenderness  : No bob  Skin: No rashes or lesions. Skin is warm and dry to the touch.   Neuro: Alert and Awake. CN 2-12 Grossly intact. Moves all four extremities spontaneously.  Psych: Calm, Pleasant, Cooperative                          8.7    3.46  )-----------( 113      ( 04 Aug 2020 06:05 )             26.2       08-04    135  |  103  |  31<H>  ----------------------------<  261<H>  4.9   |  23  |  1.19    Ca    9.0      04 Aug 2020 06:05  Phos  2.7     08-04  Mg     1.8     08-04    TPro  5.5<L>  /  Alb  3.2<L>  /  TBili  0.5  /  DBili  0.2  /  AST  17  /  ALT  24  /  AlkPhos  121<H>  08-04          MICROBIOLOGY:  v  .Blood Blood  07-20-20   No Growth Final  --  --      .Urine Clean Catch (Midstream)  07-20-20   No growth  --  --      .Body Fluid Abdominal Fluid  07-14-20   No growth at 5 days  --    polymorphonuclear leukocytes seen  No organisms seen  by cytocentrifuge      .Blood Blood-Peripheral  07-12-20   No Growth Final  --  --      .Urine Catheterized  07-11-20   No growth  --  --      .Blood Blood-Peripheral  07-10-20   Growth in aerobic bottle: Enterococcus faecium (vancomycin resistant)  "Due to technical problems, Proteus sp. will Not be reported as part of  the BCID panel until further notice"  ***Blood Panel PCR results on this specimen are available  approximately 3 hours after the Gram stain result.***  Gram stain, PCR, and/or culture results may not always  correspond due to difference in methodologies.  ************************************************************  This PCR assay was performed using [employer].  The following targets are tested for: Enterococcus,  vancomycin resistant enterococci, Listeria monocytogenes,  coagulase negative staphylococci, S. aureus,  methicillin resistant S. aureus, Streptococcus agalactiae  (Group B), S. pneumoniae, S. pyogenes (Group A),  Acinetobacter baumannii, Enterobacter cloacae, E. coli,  Klebsiella oxytoca, K. pneumoniae, Proteus sp.,  Serratia marcescens, Haemophilus influenzae,  Neisseria meningitidis, Pseudomonas aeruginosa, Candida  albicans, C. glabrata, C krusei, C parapsilosis,  C. tropicalis and the KPC resistance gene.  --  Blood Culture PCR  Enterococcus faecium (vancomycin resistant)        CMV IgG Antibody: <0.20 U/mL (12-04-19 @ 08:33)  Toxoplasma IgG Screen: <3.0 IU/mL (12-04-19 @ 08:33)          RADIOLOGY:    <The imaging below has been reviewed and visualized by me independently. Findings as detailed in report below>    EXAM:  CT BRAIN                        PROCEDURE DATE:  07/31/2020    Slight progression to atrophic changes compared with 6/11/2020 which may be the result of treatment-related changes. No other significant pathology

## 2020-08-05 LAB
ALBUMIN SERPL ELPH-MCNC: 3.3 G/DL — SIGNIFICANT CHANGE UP (ref 3.3–5)
ALP SERPL-CCNC: 108 U/L — SIGNIFICANT CHANGE UP (ref 40–120)
ALT FLD-CCNC: 23 U/L — SIGNIFICANT CHANGE UP (ref 10–45)
AMMONIA BLD-MCNC: 36 UMOL/L — SIGNIFICANT CHANGE UP (ref 11–55)
ANION GAP SERPL CALC-SCNC: 13 MMOL/L — SIGNIFICANT CHANGE UP (ref 5–17)
APTT BLD: 28.7 SEC — SIGNIFICANT CHANGE UP (ref 27.5–35.5)
AST SERPL-CCNC: 19 U/L — SIGNIFICANT CHANGE UP (ref 10–40)
BILIRUB DIRECT SERPL-MCNC: 0.2 MG/DL — SIGNIFICANT CHANGE UP (ref 0–0.2)
BILIRUB INDIRECT FLD-MCNC: 0.2 MG/DL — SIGNIFICANT CHANGE UP (ref 0.2–1)
BILIRUB SERPL-MCNC: 0.4 MG/DL — SIGNIFICANT CHANGE UP (ref 0.2–1.2)
BUN SERPL-MCNC: 34 MG/DL — HIGH (ref 7–23)
CALCIUM SERPL-MCNC: 9.4 MG/DL — SIGNIFICANT CHANGE UP (ref 8.4–10.5)
CHLORIDE SERPL-SCNC: 104 MMOL/L — SIGNIFICANT CHANGE UP (ref 96–108)
CHOLEST SERPL-MCNC: 158 MG/DL — SIGNIFICANT CHANGE UP (ref 10–199)
CO2 SERPL-SCNC: 24 MMOL/L — SIGNIFICANT CHANGE UP (ref 22–31)
CREAT SERPL-MCNC: 1.15 MG/DL — SIGNIFICANT CHANGE UP (ref 0.5–1.3)
CYCLOSPORINE SER-MCNC: <30 NG/ML — LOW (ref 150–400)
EVEROLIMUS, WHOLE BLOOD RESULT: 6.7 NG/ML — SIGNIFICANT CHANGE UP (ref 3–8)
GLUCOSE BLDC GLUCOMTR-MCNC: 140 MG/DL — HIGH (ref 70–99)
GLUCOSE BLDC GLUCOMTR-MCNC: 234 MG/DL — HIGH (ref 70–99)
GLUCOSE BLDC GLUCOMTR-MCNC: 251 MG/DL — HIGH (ref 70–99)
GLUCOSE BLDC GLUCOMTR-MCNC: 281 MG/DL — HIGH (ref 70–99)
GLUCOSE SERPL-MCNC: 122 MG/DL — HIGH (ref 70–99)
HCT VFR BLD CALC: 27 % — LOW (ref 39–50)
HDLC SERPL-MCNC: 44 MG/DL — SIGNIFICANT CHANGE UP
HGB BLD-MCNC: 9 G/DL — LOW (ref 13–17)
INR BLD: 1.03 RATIO — SIGNIFICANT CHANGE UP (ref 0.88–1.16)
LIPID PNL WITH DIRECT LDL SERPL: 93 MG/DL — SIGNIFICANT CHANGE UP
MAGNESIUM SERPL-MCNC: 1.7 MG/DL — SIGNIFICANT CHANGE UP (ref 1.6–2.6)
MCHC RBC-ENTMCNC: 32.5 PG — SIGNIFICANT CHANGE UP (ref 27–34)
MCHC RBC-ENTMCNC: 33.3 GM/DL — SIGNIFICANT CHANGE UP (ref 32–36)
MCV RBC AUTO: 97.5 FL — SIGNIFICANT CHANGE UP (ref 80–100)
NRBC # BLD: 0 /100 WBCS — SIGNIFICANT CHANGE UP (ref 0–0)
PHOSPHATE SERPL-MCNC: 3.4 MG/DL — SIGNIFICANT CHANGE UP (ref 2.5–4.5)
PLATELET # BLD AUTO: 114 K/UL — LOW (ref 150–400)
POTASSIUM SERPL-MCNC: 4.9 MMOL/L — SIGNIFICANT CHANGE UP (ref 3.5–5.3)
POTASSIUM SERPL-SCNC: 4.9 MMOL/L — SIGNIFICANT CHANGE UP (ref 3.5–5.3)
PROCALCITONIN SERPL-MCNC: 0.2 NG/ML — HIGH (ref 0.02–0.1)
PROT SERPL-MCNC: 5.8 G/DL — LOW (ref 6–8.3)
PROTHROM AB SERPL-ACNC: 12.2 SEC — SIGNIFICANT CHANGE UP (ref 10.6–13.6)
RBC # BLD: 2.77 M/UL — LOW (ref 4.2–5.8)
RBC # FLD: 17.5 % — HIGH (ref 10.3–14.5)
SODIUM SERPL-SCNC: 141 MMOL/L — SIGNIFICANT CHANGE UP (ref 135–145)
TOTAL CHOLESTEROL/HDL RATIO MEASUREMENT: 3.6 RATIO — SIGNIFICANT CHANGE UP (ref 3.4–9.6)
TRIGL SERPL-MCNC: 105 MG/DL — SIGNIFICANT CHANGE UP (ref 10–149)
WBC # BLD: 2.98 K/UL — LOW (ref 3.8–10.5)
WBC # FLD AUTO: 2.98 K/UL — LOW (ref 3.8–10.5)

## 2020-08-05 PROCEDURE — 99232 SBSQ HOSP IP/OBS MODERATE 35: CPT | Mod: GC,24

## 2020-08-05 PROCEDURE — 99232 SBSQ HOSP IP/OBS MODERATE 35: CPT

## 2020-08-05 PROCEDURE — 99232 SBSQ HOSP IP/OBS MODERATE 35: CPT | Mod: GC

## 2020-08-05 RX ORDER — BLOOD SUGAR DIAGNOSTIC
STRIP MISCELLANEOUS 4 TIMES DAILY
Qty: 1 | Refills: 3 | Status: ACTIVE | COMMUNITY
Start: 2020-08-05 | End: 1900-01-01

## 2020-08-05 RX ORDER — BLOOD-GLUCOSE METER
W/DEVICE EACH MISCELLANEOUS
Qty: 1 | Refills: 0 | Status: ACTIVE | COMMUNITY
Start: 2020-08-05 | End: 1900-01-01

## 2020-08-05 RX ORDER — MAGNESIUM SULFATE 500 MG/ML
1 VIAL (ML) INJECTION ONCE
Refills: 0 | Status: COMPLETED | OUTPATIENT
Start: 2020-08-05 | End: 2020-08-05

## 2020-08-05 RX ADMIN — Medication 3: at 21:18

## 2020-08-05 RX ADMIN — Medication 81 MILLIGRAM(S): at 11:04

## 2020-08-05 RX ADMIN — MAGNESIUM OXIDE 400 MG ORAL TABLET 800 MILLIGRAM(S): 241.3 TABLET ORAL at 17:20

## 2020-08-05 RX ADMIN — Medication 1 DROP(S): at 21:51

## 2020-08-05 RX ADMIN — Medication 5 MILLIGRAM(S): at 05:56

## 2020-08-05 RX ADMIN — INSULIN GLARGINE 5 UNIT(S): 100 INJECTION, SOLUTION SUBCUTANEOUS at 21:17

## 2020-08-05 RX ADMIN — MAGNESIUM OXIDE 400 MG ORAL TABLET 800 MILLIGRAM(S): 241.3 TABLET ORAL at 08:09

## 2020-08-05 RX ADMIN — Medication 1 APPLICATION(S): at 11:04

## 2020-08-05 RX ADMIN — NYSTATIN CREAM 1 APPLICATION(S): 100000 CREAM TOPICAL at 17:20

## 2020-08-05 RX ADMIN — EVEROLIMUS 3.5 MILLIGRAM(S): 10 TABLET ORAL at 21:05

## 2020-08-05 RX ADMIN — NYSTATIN CREAM 1 APPLICATION(S): 100000 CREAM TOPICAL at 05:54

## 2020-08-05 RX ADMIN — Medication 50 MILLIGRAM(S): at 21:51

## 2020-08-05 RX ADMIN — PANTOPRAZOLE SODIUM 40 MILLIGRAM(S): 20 TABLET, DELAYED RELEASE ORAL at 05:56

## 2020-08-05 RX ADMIN — MYCOPHENOLATE MOFETIL 750 MILLIGRAM(S): 250 CAPSULE ORAL at 20:51

## 2020-08-05 RX ADMIN — Medication 100 GRAM(S): at 10:43

## 2020-08-05 RX ADMIN — CHLORHEXIDINE GLUCONATE 1 APPLICATION(S): 213 SOLUTION TOPICAL at 05:53

## 2020-08-05 RX ADMIN — Medication 500000 UNIT(S): at 17:19

## 2020-08-05 RX ADMIN — Medication 3: at 11:51

## 2020-08-05 RX ADMIN — Medication 1 TABLET(S): at 11:05

## 2020-08-05 RX ADMIN — Medication 500000 UNIT(S): at 11:04

## 2020-08-05 RX ADMIN — VALGANCICLOVIR 900 MILLIGRAM(S): 450 TABLET, FILM COATED ORAL at 11:04

## 2020-08-05 RX ADMIN — MYCOPHENOLATE MOFETIL 750 MILLIGRAM(S): 250 CAPSULE ORAL at 08:08

## 2020-08-05 RX ADMIN — EVEROLIMUS 3.5 MILLIGRAM(S): 10 TABLET ORAL at 08:11

## 2020-08-05 RX ADMIN — Medication 500000 UNIT(S): at 05:56

## 2020-08-05 RX ADMIN — TAMSULOSIN HYDROCHLORIDE 0.8 MILLIGRAM(S): 0.4 CAPSULE ORAL at 20:51

## 2020-08-05 RX ADMIN — Medication 1 DROP(S): at 05:57

## 2020-08-05 RX ADMIN — Medication 2: at 17:20

## 2020-08-05 NOTE — PHARMACY EDUCATION NOTE - EDUCATION SUMMARY
Discharge immunosuppressant medications and prophylatic anti-infective agents reviewed with the wife and son. Outpatient medication schedule was discussed in detail including: medication name, indication, dose, administration times, treatment duration, side effects, drug interactions, and special instructions.     Wife and son questions and concerns were answered and addressed. Wife and son demonstrated understanding.

## 2020-08-05 NOTE — PROGRESS NOTE ADULT - SUBJECTIVE AND OBJECTIVE BOX
GASTROENTEROLOGY    Patient seen and examined  spontaneously falling asleep during evaluation  tolerating diet without nausea, vomiting, or abdominal pain  +bm this morning as per PCA         MEDICATIONS  (STANDING):  aspirin enteric coated 81 milliGRAM(s) Oral daily  chlorhexidine 2% Cloths 1 Application(s) Topical <User Schedule>  collagenase Ointment 1 Application(s) Topical daily  diphenhydrAMINE 50 milliGRAM(s) Oral at bedtime  everolimus (ZORTRESS) 3.5 milliGRAM(s) Oral <User Schedule>  insulin lispro (HumaLOG) corrective regimen sliding scale   SubCutaneous Before meals and at bedtime  magnesium oxide 800 milliGRAM(s) Oral two times a day with meals  mycophenolate mofetil 750 milliGRAM(s) Oral <User Schedule>  nystatin    Suspension 899781 Unit(s) Oral four times a day  nystatin Cream 1 Application(s) Topical two times a day  pantoprazole    Tablet 40 milliGRAM(s) Oral before breakfast  predniSONE   Tablet 5 milliGRAM(s) Oral daily  tamsulosin 0.8 milliGRAM(s) Oral at bedtime  trimethoprim   80 mG/sulfamethoxazole 400 mG 1 Tablet(s) Oral daily  valGANciclovir 50 mG/mL Oral Solution 900 milliGRAM(s) Oral daily        T(F): 98.1 (08-02-20 @ 05:13), Max: 98.3 (08-01-20 @ 09:00)  HR: 75 (08-02-20 @ 05:13) (75 - 85)  BP: 133/64 (08-02-20 @ 05:13) (103/55 - 136/67)  RR: 18 (08-02-20 @ 05:13) (18 - 18)  SpO2: 98% (08-02-20 @ 05:13) (96% - 100%)  Wt(kg): --    PHYSICAL EXAM  GENERAL:   NAD  HEENT:  NC/AT, no JVD, sclera-anicteric  ABDOMEN:  Soft, non-distended, (+) bowel sounds, incision c/d/i  EXTREMITIES:  + peripheral edema  NEURO:  tired but able to answer questions appropriately   SKIN:  No rash/warm/dry      LABS:                        8.6<L>  3.18<L> )-----------( 114<L>    ( 02 Aug 2020 06:17 )             25.8<L>  02 Aug 2020 06:17    08-02    139  |  106  |  31<H>  ----------------------------<  162<H>  4.7   |  21<L>  |  1.09    Ca    9.1      02 Aug 2020 06:17  Phos  3.2     08-02  Mg     1.7     08-02    TPro  5.3<L>  /  Alb  3.2<L>  /  TBili  0.6  /  DBili  0.2  /  AST  15  /  ALT  23  /  AlkPhos  102  08-02    LIVER FUNCTIONS - ( 02 Aug 2020 06:17 )  Alb: 3.2 g/dL / Pro: 5.3 g/dL / ALK PHOS: 102 U/L / ALT: 23 U/L / AST: 15 U/L / GGT: x           PT/INR - ( 02 Aug 2020 06:17 )   PT: 12.4 sec;   INR: 1.04 ratio         PTT - ( 02 Aug 2020 06:17 )  PTT:29.2 sec  I&O's Detail    01 Aug 2020 07:01  -  02 Aug 2020 07:00  --------------------------------------------------------  IN:    Oral Fluid: 1500 mL    Solution: 100 mL  Total IN: 1600 mL    OUT:    Voided: 800 mL  Total OUT: 800 mL    Total NET: 800 mL

## 2020-08-05 NOTE — PROGRESS NOTE ADULT - ASSESSMENT
64 M hx of DM, HLD, GERD, HFpEF with mild LV diastolic dysfunction, and decompensated JEAN cirrhosis c/b ascites with hx of SBP, HE, duodenal ulcer, GAVE and duodenal AVM s/p APC (last on 10/11/19), s/p liver transplant this admission (7/2/20). Post-op course c/b tacrolimus toxicity and hospital derlirium, improving after stopping CNI and replacing w/ everolimus.     Impression:  #S/p liver transplant 7/2: liver enzymes continue to downtrend  OR details:  - L groin cutdown for vv bypass   - anastomosis: bicaval end-end/CBD duct-duct/HA & PV end-end, all standard anatomy.    - IOC with good flow  - Simulect induction. 500mg Solumedrol  - JPs x3 with T-Tube  - 14U pRBC, 14U FFP, 10 PLT, 11 CRYO, 500mL returned from cell saver, EBL 5L, UOP 1100mL  Recipient Info:   ABO: A  CMV:  Neg  EBV Positive  Donor:  Donor ID:  [id number] Match: [id number]  Age: 29 ABO:  A1 High Risk:   No COD: Cardiovascular  Anti CMV positive, EBV IgG- positive  HepBcAb-neg, Hepatitis C-DANA- neg, Hepatitis C ab-neg    #Halluciniations/agitation/AMS/tremors – due to CNI toxicity, improving now that pt is off CNI and on everolimus  #Sacral decubitus ulcer: stage I, does not appear infected  #GI bleed: Resolved. Hgb stable with no overt bleeding post-transplant. 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 GAVE and PHG likely improved pos ttransplant.  #R posterior heel wound w/ overlying eschar –Local wound care. no signs of infections, XR neg for gas, evaluated by podiatry and ID. MRI w/o contrast limited, but no overt signs of active infection.  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     Recommendation:  - consider stopping benadryl   - continue with everolimus dosed per transplant surgery, f/u levels (3.5mg BID)  - continue Cellcept dosing as per transplant surgery  - continue prednisone 5 mg PO daily  - frequent reorientation and visitation   - normalized sleep/wake cycle environment  - continue nystatin, Bactrim and Valcyte ppx  - physical therapy daily to get OOB and ambulating  - continue with mag oxide 200 mg BID  - monitor Hb on daily CBC - continue PO PPI BID, monitor bowel movements  - c/w ASA 81mg daily  - continue to hold diuretics  - follow up Wound care recommendations for R heel ulcer and sacral decub  - follow up other Transplant surgery recommendations       Ze Dominguez  Gastroenterology Fellow  AT NIGHT AND ON WEEKENDS:  Contact on-call GI fellow via answering service ([phone]) from 5pm-8am and on weekends/holidays  MONDAY-FRIDAY 8AM-5PM:  Available via [employer] Teams  Call [phone] ([facility]) or Page [phone] ([facility]) from 8am-5pm M-F

## 2020-08-05 NOTE — PROGRESS NOTE ADULT - ATTENDING COMMENTS
64 M w/ hx of DM, HLD, GERD, HFpEF with mild LV diastolic dysfunction, and decompensated JEAN cirrhosis c/b ascites with hx of SBP, HE, duodenal ulcer, GAVE and duodenal AVM s/p APC (last on 10/11/19), s/p OLT (7/2/20). Post-op course c/b CNI neurotoxicity + delirium. Now on everolimus + prednisone.     Sleepy today, would try to refrain from benadryl for insomnia.

## 2020-08-05 NOTE — PROGRESS NOTE ADULT - SUBJECTIVE AND OBJECTIVE BOX
Mundo Roland Follow Up:  encephalopathy    Interval History: patient afebrile. somewhat lethargic again on my assessment today. denies any acute complaints however.     REVIEW OF SYSTEMS  [  ] ROS unobtainable because:    [ x ] All other systems negative except as noted below    Constitutional:  [ ] fever [ ] chills  [ ] weight loss  [ ] weakness  Skin:  [ ] rash [ ] phlebitis	  Eyes: [ ] icterus [ ] pain  [ ] discharge	  ENMT: [ ] sore throat  [ ] thrush [ ] ulcers [ ] exudates  Respiratory: [ ] dyspnea [ ] hemoptysis [ ] cough [ ] sputum	  Cardiovascular:  [ ] chest pain [ ] palpitations [ ] edema	  Gastrointestinal:  [ ] nausea [ ] vomiting [ ] diarrhea [ ] constipation [ ] pain	  Genitourinary:  [ ] dysuria [ ] frequency [ ] hematuria [ ] discharge [ ] flank pain  [ ] incontinence  Musculoskeletal:  [ ] myalgias [ ] arthralgias [ ] arthritis  [ ] back pain  Neurological:  [ ] headache [ ] seizures  [ ] confusion/altered mental status    Allergies  codeine (Anaphylaxis)        ANTIMICROBIALS:  nystatin    Suspension 350123 four times a day  trimethoprim   80 mG/sulfamethoxazole 400 mG 1 daily  valGANciclovir 900 daily      OTHER MEDS:  MEDICATIONS  (STANDING):  aspirin enteric coated 81 daily  diphenhydrAMINE 50 <User Schedule> PRN  everolimus (ZORTRESS) 3.5 <User Schedule>  insulin glargine Injectable (LANTUS) 5 at bedtime  insulin lispro (HumaLOG) corrective regimen sliding scale  Before meals and at bedtime  mycophenolate mofetil 750 <User Schedule>  pantoprazole    Tablet 40 before breakfast  predniSONE   Tablet 5 daily  tamsulosin 0.8 at bedtime      Vital Signs Last 24 Hrs  T(C): 36.8 (05 Aug 2020 17:10), Max: 36.9 (04 Aug 2020 21:08)  T(F): 98.2 (05 Aug 2020 17:10), Max: 98.5 (04 Aug 2020 21:08)  HR: 85 (05 Aug 2020 17:10) (79 - 91)  BP: 124/63 (05 Aug 2020 17:10) (121/65 - 145/73)  BP(mean): --  RR: 18 (05 Aug 2020 17:10) (18 - 18)  SpO2: 100% (05 Aug 2020 17:10) (98% - 100%)    PHYSICAL EXAMINATION:  General: Alert and Awake, NAD  HEENT: PERRL, EOMI  Neck: Supple  Cardiac: RRR, No M/R/G  Resp: CTAB, No Wh/Rh/Ra  Abdomen: Surgical site from OLT is clean, without erythema and without drainage. NBS, NT/ND, No HSM, No rigidity or guarding  MSK: No LE edema. No Calf tenderness  : No bob  Skin: No rashes or lesions. Skin is warm and dry to the touch.   Neuro: Alert and Awake. CN 2-12 Grossly intact. Moves all four extremities spontaneously.  Psych: Calm, Pleasant, Cooperative                          9.0    2.98  )-----------( 114      ( 05 Aug 2020 06:52 )             27.0       08-05    141  |  104  |  34<H>  ----------------------------<  122<H>  4.9   |  24  |  1.15    Ca    9.4      05 Aug 2020 06:50  Phos  3.4     08-05  Mg     1.7     08-05    TPro  5.8<L>  /  Alb  3.3  /  TBili  0.4  /  DBili  0.2  /  AST  19  /  ALT  23  /  AlkPhos  108  08-05          MICROBIOLOGY:  v  .Blood Blood  07-20-20   No Growth Final  --  --      .Urine Clean Catch (Midstream)  07-20-20   No growth  --  --      .Body Fluid Abdominal Fluid  07-14-20   No growth at 5 days  --    polymorphonuclear leukocytes seen  No organisms seen  by cytocentrifuge      .Blood Blood-Peripheral  07-12-20   No Growth Final  --  --      .Urine Catheterized  07-11-20   No growth  --  --      .Blood Blood-Peripheral  07-10-20   Growth in aerobic bottle: Enterococcus faecium (vancomycin resistant)  "Due to technical problems, Proteus sp. will Not be reported as part of  the BCID panel until further notice"  ***Blood Panel PCR results on this specimen are available  approximately 3 hours after the Gram stain result.***  Gram stain, PCR, and/or culture results may not always  correspond due to difference in methodologies.  ************************************************************  This PCR assay was performed using [employer].  The following targets are tested for: Enterococcus,  vancomycin resistant enterococci, Listeria monocytogenes,  coagulase negative staphylococci, S. aureus,  methicillin resistant S. aureus, Streptococcus agalactiae  (Group B), S. pneumoniae, S. pyogenes (Group A),  Acinetobacter baumannii, Enterobacter cloacae, E. coli,  Klebsiella oxytoca, K. pneumoniae, Proteus sp.,  Serratia marcescens, Haemophilus influenzae,  Neisseria meningitidis, Pseudomonas aeruginosa, Candida  albicans, C. glabrata, C krusei, C parapsilosis,  C. tropicalis and the KPC resistance gene.  --  Blood Culture PCR  Enterococcus faecium (vancomycin resistant)        CMV IgG Antibody: <0.20 U/mL (12-04-19 @ 08:33)  Toxoplasma IgG Screen: <3.0 IU/mL (12-04-19 @ 08:33)          RADIOLOGY:    <The imaging below has been reviewed and visualized by me independently. Findings as detailed in report below>    EXAM:  CT BRAIN                        PROCEDURE DATE:  07/31/2020    Slight progression to atrophic changes compared with 6/11/2020 which may be the result of treatment-related changes. No other significant pathology

## 2020-08-05 NOTE — PROGRESS NOTE ADULT - SUBJECTIVE AND OBJECTIVE BOX
TRANSPLANT SURGERY MULTIDISCIPLINARY NOTE    OLTx      Date:  7/2/2020       POD #34    Present:  Patient seen and examined with multidisciplinary team including Transplant Surgeon: Dr. Alonzo, Transplant Nephrologist: Dr. Robin Kelly, Pharmacist: Jessica Nagel. OLY Bingham, and unit RN during am rounds.  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     Re-admitted on 6/11:   ·	worsening HE  ·	UTI/VRE: tx with linezolid 6/15-22, bactrim DS 6/22 - 7/2  ·	MISA/Hyponatremia  ·	UGI bleed (melena) s/p EGD (6/22) c/w hemorrhagic duodenitis/gastritis; Grade 2 EV; requiring multiple transfusions  ·	Known h/o R foot ulcer (MRI neg for OM)  ·	s/p OLTx on 7/2/2020, post op liver doppler with patent vessels  ·	post op course c/b VRE bacteremia (7/10), tx with linezolid 7/10-7/23, dapto 7/23-7/26  ·	post op course c/b delirium likely in setting of CNI toxicity; CT head neg (7/31); off FK 7/19, off cylco 7/27. Everolimus initiated 7/27     Interval events:  - no overnight events; afebrile  - Mental status and tremors improving  - Stable liver function tests  - Tolerating PO intake, no N/V  - Incontinent with BM  - WBC trending down 2.98 (from 3.46)    Potential Discharge date: pending clinical improvement, likely over the weekend     Education:  Medications    Plan of care:  See Below      MEDICATIONS  (STANDING):  aspirin enteric coated 81 milliGRAM(s) Oral daily  chlorhexidine 2% Cloths 1 Application(s) Topical <User Schedule>  collagenase Ointment 1 Application(s) Topical daily  everolimus (ZORTRESS) 3.5 milliGRAM(s) Oral <User Schedule>  insulin glargine Injectable (LANTUS) 5 Unit(s) SubCutaneous at bedtime  insulin lispro (HumaLOG) corrective regimen sliding scale   SubCutaneous Before meals and at bedtime  magnesium oxide 800 milliGRAM(s) Oral two times a day with meals  mycophenolate mofetil 750 milliGRAM(s) Oral <User Schedule>  nystatin    Suspension 636485 Unit(s) Oral four times a day  nystatin Cream 1 Application(s) Topical two times a day  pantoprazole    Tablet 40 milliGRAM(s) Oral before breakfast  predniSONE   Tablet 5 milliGRAM(s) Oral daily  tamsulosin 0.8 milliGRAM(s) Oral at bedtime  trimethoprim   80 mG/sulfamethoxazole 400 mG 1 Tablet(s) Oral daily  valGANciclovir 900 milliGRAM(s) Oral daily    MEDICATIONS  (PRN):  artificial  tears Solution 1 Drop(s) Both EYES two times a day PRN Dry Eyes  diphenhydrAMINE 50 milliGRAM(s) Oral <User Schedule> PRN Rash and/or Itching      PAST MEDICAL & SURGICAL HISTORY:  GIB (gastrointestinal bleeding)  GERD with esophagitis: Gastritis &amp; Non Bleeding Ulcers  Hepatic encephalopathy  Obesity  Fatty liver disease, nonalcoholic  Renal stones: 25 years ago  Hypertension  Neuropathy  Hypercholesteremia  Diabetes  S/P cholecystectomy      Vital Signs Last 24 Hrs  T(C): 36.9 (05 Aug 2020 09:00), Max: 36.9 (04 Aug 2020 21:08)  T(F): 98.5 (05 Aug 2020 09:00), Max: 98.5 (04 Aug 2020 21:08)  HR: 91 (05 Aug 2020 09:00) (86 - 91)  BP: 121/65 (05 Aug 2020 09:00) (121/61 - 145/73)  BP(mean): --  RR: 18 (05 Aug 2020 05:00) (18 - 18)  SpO2: 100% (05 Aug 2020 09:00) (96% - 100%)    I&O's Summary    04 Aug 2020 07:01  -  05 Aug 2020 07:00  --------------------------------------------------------  IN: 660 mL / OUT: 1850 mL / NET: -1190 mL    05 Aug 2020 07:01  -  05 Aug 2020 10:56  --------------------------------------------------------  IN: 240 mL / OUT: 0 mL / NET: 240 mL                              9.0    2.98  )-----------( 114      ( 05 Aug 2020 06:52 )             27.0     08-05    141  |  104  |  34<H>  ----------------------------<  122<H>  4.9   |  24  |  1.15    Ca    9.4      05 Aug 2020 06:50  Phos  3.4     08-05  Mg     1.7     08-05    TPro  5.8<L>  /  Alb  3.3  /  TBili  0.4  /  DBili  0.2  /  AST  19  /  ALT  23  /  AlkPhos  108  08-05    Review of symptoms:   Gen: no weight changes  Skin: no rashes  Head/Eyes/Ears/Mouth: no headache, normal hearing, no changes to vision  Respiratory: no dyspnea, cough  CV: No chest pain, PND, orthopnea  GI: stool incontinence  : no pain on urination  MSK: no joint swelling  Neuro: No dizziness  Heme: no easy bruising or bleeding   Endo: no heat/cold intolerance  Psych: no significant nervousness, anxiety, depression    Physical Exam  Constitutional: NAD/WD/WN  Eyes: Anicteric, PERRLA  ENMT: nc/at. no thrush or ulcerations appreciated  Neck: no JVD, supple  Respiratory: CTA B/L, unlabored breathing   Cardiovascular: RRR  Gastrointestinal: Soft abdomen, non tender to touch at surgical site, ND  Genitourinary: Voiding spontaneously  Extremities: SCD's in place and working bilaterally  Vascular: Palpable dp pulses bilaterally  Neurological: A&O x2-3, following commands   Musculoskeletal: Moving all extremities  Psychiatric: Responsive TRANSPLANT SURGERY MULTIDISCIPLINARY NOTE    OLTx      Date:  7/2/2020       POD #34    Present:  Patient seen and examined with multidisciplinary team including Transplant Surgeon: Dr. Alonzo, Transplant Nephrologist: Dr. Robin Kelly, Pharmacist: Jessica Nagel. OLY Bingham, and unit RN during am rounds.  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     Re-admitted on 6/11:   ·	worsening HE  ·	UTI/VRE: tx with linezolid 6/15-22, bactrim DS 6/22 - 7/2  ·	MISA/Hyponatremia  ·	UGI bleed (melena) s/p EGD (6/22) c/w hemorrhagic duodenitis/gastritis; Grade 2 EV; requiring multiple transfusions  ·	Known h/o R foot ulcer (MRI neg for OM)  ·	s/p OLTx on 7/2/2020, post op liver doppler with patent vessels  ·	post op course c/b VRE bacteremia (7/10), tx with linezolid 7/10-7/23, dapto 7/23-7/26  ·	post op course c/b delirium likely in setting of CNI toxicity; CT head neg (7/31); off FK 7/19, off cylco 7/27. Everolimus initiated 7/27     Interval events:  - no overnight events; afebrile  - this am drowsy, easily arousable, following commands  - Stable liver function tests  - Tolerating PO intake, no N/V  - Incontinent with BM  - WBC trending down 2.98 (from 3.46)    Potential Discharge date: pending clinical improvement, likely over the weekend     Education:  Medications    Plan of care:  See Below      MEDICATIONS  (STANDING):  aspirin enteric coated 81 milliGRAM(s) Oral daily  chlorhexidine 2% Cloths 1 Application(s) Topical <User Schedule>  collagenase Ointment 1 Application(s) Topical daily  everolimus (ZORTRESS) 3.5 milliGRAM(s) Oral <User Schedule>  insulin glargine Injectable (LANTUS) 5 Unit(s) SubCutaneous at bedtime  insulin lispro (HumaLOG) corrective regimen sliding scale   SubCutaneous Before meals and at bedtime  magnesium oxide 800 milliGRAM(s) Oral two times a day with meals  mycophenolate mofetil 750 milliGRAM(s) Oral <User Schedule>  nystatin    Suspension 333472 Unit(s) Oral four times a day  nystatin Cream 1 Application(s) Topical two times a day  pantoprazole    Tablet 40 milliGRAM(s) Oral before breakfast  predniSONE   Tablet 5 milliGRAM(s) Oral daily  tamsulosin 0.8 milliGRAM(s) Oral at bedtime  trimethoprim   80 mG/sulfamethoxazole 400 mG 1 Tablet(s) Oral daily  valGANciclovir 900 milliGRAM(s) Oral daily    MEDICATIONS  (PRN):  artificial  tears Solution 1 Drop(s) Both EYES two times a day PRN Dry Eyes  diphenhydrAMINE 50 milliGRAM(s) Oral <User Schedule> PRN Rash and/or Itching      PAST MEDICAL & SURGICAL HISTORY:  GIB (gastrointestinal bleeding)  GERD with esophagitis: Gastritis &amp; Non Bleeding Ulcers  Hepatic encephalopathy  Obesity  Fatty liver disease, nonalcoholic  Renal stones: 25 years ago  Hypertension  Neuropathy  Hypercholesteremia  Diabetes  S/P cholecystectomy      Vital Signs Last 24 Hrs  T(C): 36.9 (05 Aug 2020 09:00), Max: 36.9 (04 Aug 2020 21:08)  T(F): 98.5 (05 Aug 2020 09:00), Max: 98.5 (04 Aug 2020 21:08)  HR: 91 (05 Aug 2020 09:00) (86 - 91)  BP: 121/65 (05 Aug 2020 09:00) (121/61 - 145/73)  BP(mean): --  RR: 18 (05 Aug 2020 05:00) (18 - 18)  SpO2: 100% (05 Aug 2020 09:00) (96% - 100%)    I&O's Summary    04 Aug 2020 07:01  -  05 Aug 2020 07:00  --------------------------------------------------------  IN: 660 mL / OUT: 1850 mL / NET: -1190 mL    05 Aug 2020 07:01  -  05 Aug 2020 10:56  --------------------------------------------------------  IN: 240 mL / OUT: 0 mL / NET: 240 mL                              9.0    2.98  )-----------( 114      ( 05 Aug 2020 06:52 )             27.0     08-05    141  |  104  |  34<H>  ----------------------------<  122<H>  4.9   |  24  |  1.15    Ca    9.4      05 Aug 2020 06:50  Phos  3.4     08-05  Mg     1.7     08-05    TPro  5.8<L>  /  Alb  3.3  /  TBili  0.4  /  DBili  0.2  /  AST  19  /  ALT  23  /  AlkPhos  108  08-05    Review of symptoms:   Gen: no weight changes  Skin: no rashes  Head/Eyes/Ears/Mouth: no headache, normal hearing, no changes to vision  Respiratory: no dyspnea, cough  CV: No chest pain, PND, orthopnea  GI: stool incontinence  : no pain on urination  MSK: no joint swelling  Neuro: No dizziness  Heme: no easy bruising or bleeding   Endo: no heat/cold intolerance  Psych: no significant nervousness, anxiety, depression    Physical Exam  Constitutional: NAD/WD/WN  Eyes: Anicteric, PERRLA  ENMT: nc/at. no thrush or ulcerations appreciated  Neck: no JVD, supple  Respiratory: CTA B/L, unlabored breathing   Cardiovascular: RRR  Gastrointestinal: Soft abdomen, non tender to touch at surgical site, ND  Genitourinary: Voiding spontaneously  Extremities: SCD's in place and working bilaterally  Vascular: Palpable dp pulses bilaterally  Neurological: A&O x2-3, following commands   Musculoskeletal: Moving all extremities  Psychiatric: Responsive

## 2020-08-05 NOTE — PROGRESS NOTE ADULT - ASSESSMENT
65 y/o M PMHx decompensated JEAN Cirrhosis on transplant list, DMII, HFpEF, recent admission for ESBL E coli prostatic abscess 2/2 4 wks ertapenem, hx of ESBL UTIs with prostate abscess s/p IV abx, recent VRE urinary colonization came to hospital for worsening jaundice and episode of confusion, resolved PTA. s/p treatment course for possible VRE UTI. Now s/p OLT on 7/2/20. Course complicated by Encephalopathy and Tremors. Blood Cultures (7/10) with VRE sensitive to Linezolid. CT C/A/P without obvious intraabdominal source. Hypoxia prior to positive BCx which may be related to sepsis CT Chest (7/11) without evidence of pneumonia. Had L groin fluid collection which appears to be consistent with seroma - sample obtained for culture also negative. No drainage, erythema or underlying fluctuance was appreciated at the right heel. MRI prior to transplant without obvious infection. Transferred from [facility] to [facility] on 7/17/2020.    Currently on:  nystatin    Suspension [id number] four times a day  trimethoprim   80 mG/sulfamethoxazole 400 mG 1 daily  valGANciclovir 900 daily    #Encephalopathy (improving)  - Switched to Everolimus on 7/27 to avoid CNI CNS Toxicity   - CT result(7/31): Slight progression to atrophic changes compared with 6/11/2020 which may be the result of treatment-related changes. No other significant pathology  - Continue to monitor    #VRE Bacteremia, L Groin Collection, Encephalopathy (improving)  - s/p Linezolid and Daptomycin ended on 7/26 for VRE bacteremia post-OLT  - Abd US showed 9cm Left groin collection: likely represents a seroma or lymphocele; aspirated - cx: PMN seen, no organism seen  - Continue monitoring cylosporine levels  - TTE negative for endocarditis  - UA/UC on 7/20 no UTI; UA on 7/30 negative  - BCx on 7/20 NGTD    #s/p OLT   - Continue bactrim, valcyte (CMV D+/R-) for prophylaxis  - Monitor patient for tremors - was on tacrolimus(CI), then cylosporine(CI), now on Everolimus(mTor I)    #Right heel wound  --Podiatry on board    --Local wound care     I will continue to follow. Please feel free to contact me with any further questions.    Eusebio Pérez M.D.  [facility] Division of Infectious Disease  8AM-5PM: Pager Number [phone]  After Hours (or if no response): Please contact the Infectious Diseases Office at [phone]

## 2020-08-05 NOTE — PHARMACY EDUCATION NOTE - MEDICATION SAFETY
Adherence/Herbals/Interactions/Medication precautions/Side effects/Signs and symptoms to report/Allergies/Miss dose instruction/Purpose/Storage and handling

## 2020-08-05 NOTE — PROGRESS NOTE ADULT - ASSESSMENT
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 c/b VRE bactermia and delirium    [] POD 34 s/p OLT   - Good graft function, LFTs stable   - Immuno:  Everolimus 3.5mg bid, MMF 750mg bid, Pred 5     -->Tacro DC'd 7/19. Cyclosporine DC'd 7/27.  Changed to Everolimus on 7/27 due to persistent delirium/AMS, now with improvement     -->Daily Everolimus leve; d/karlee Cyclosporine levels  - PPX: Valcyte/Nystatin/Bactrim  - Mental status improving slowly.  CT Head negative  - Diet: Regular, tolerating, PO intake improving  - Continue ASA 81mg daily  - h/o UGI bleed: Protonix 40mg daily.   - DVT PPx: SCDs at all times  - Sacral decub: seen by  wound care, recs appreciated, appears to be healing well  - Pain control PRN  - Mag 1g IV (keep Mag >2)    [] VRE bacteremia (7/10)  - tx with linezolid 7/10-7/23,  Daptomycin 7/23-7/26   - BCx from 7/20 with NG, UCx (7/20) with NG     [] DM  - Appreciate Endocrinology recommendations  - Continue lantus 5u qhs and ISS  - Fingersticks ac and qhs    [] Heel ulcer  - Continue local wound care w/ santyl and DSD as per podiatry    [] Dispo:    - continue 1:1 with tele  - expect d/c home with home PT in one week if continues to progress well  - daily PT/OT (team aware)  - discharge planning in progress w/ Liver transplant

## 2020-08-05 NOTE — PROGRESS NOTE ADULT - SUBJECTIVE AND OBJECTIVE BOX
Chief Complaint:  Patient is a 64y old  Male who presents with a chief complaint of Liver failure, hepatic encephalopathy (05 Aug 2020 10:49)    Reason for consult: s/p OLT    Interval Events: Pt sleepy this AM, but slept well, able to eat.     Hospital Medications:  artificial  tears Solution 1 Drop(s) Both EYES two times a day PRN  aspirin enteric coated 81 milliGRAM(s) Oral daily  chlorhexidine 2% Cloths 1 Application(s) Topical <User Schedule>  collagenase Ointment 1 Application(s) Topical daily  diphenhydrAMINE 50 milliGRAM(s) Oral <User Schedule> PRN  everolimus (ZORTRESS) 3.5 milliGRAM(s) Oral <User Schedule>  insulin glargine Injectable (LANTUS) 5 Unit(s) SubCutaneous at bedtime  insulin lispro (HumaLOG) corrective regimen sliding scale   SubCutaneous Before meals and at bedtime  magnesium oxide 800 milliGRAM(s) Oral two times a day with meals  mycophenolate mofetil 750 milliGRAM(s) Oral <User Schedule>  nystatin    Suspension 234976 Unit(s) Oral four times a day  nystatin Cream 1 Application(s) Topical two times a day  pantoprazole    Tablet 40 milliGRAM(s) Oral before breakfast  predniSONE   Tablet 5 milliGRAM(s) Oral daily  tamsulosin 0.8 milliGRAM(s) Oral at bedtime  trimethoprim   80 mG/sulfamethoxazole 400 mG 1 Tablet(s) Oral daily  valGANciclovir 900 milliGRAM(s) Oral daily    ROS: Pt unable to provide    PHYSICAL EXAM:   Vital Signs:  Vital Signs Last 24 Hrs  T(C): 36.9 (05 Aug 2020 09:00), Max: 36.9 (04 Aug 2020 21:08)  T(F): 98.5 (05 Aug 2020 09:00), Max: 98.5 (04 Aug 2020 21:08)  HR: 85 (05 Aug 2020 12:43) (85 - 91)  BP: 121/65 (05 Aug 2020 09:00) (121/65 - 145/73)  BP(mean): --  RR: 18 (05 Aug 2020 05:00) (18 - 18)  SpO2: 100% (05 Aug 2020 12:43) (96% - 100%)  Daily     Daily Weight in k.3 (05 Aug 2020 06:01)    GENERAL: no acute distress  NEURO: sleepy but answers questions  HEENT: anicteric sclera, no conjunctival pallor appreciated  CHEST: no respiratory distress, no accessory muscle use  CARDIAC: regular rate, rhythm  ABDOMEN: soft, non-tender, non-distended, no rebound or guarding  EXTREMITIES: warm, well perfused, no edema  SKIN: no lesions noted    LABS: reviewed                        9.0    2.98  )-----------( 114      ( 05 Aug 2020 06:52 )             27.0     08-05    141  |  104  |  34<H>  ----------------------------<  122<H>  4.9   |  24  |  1.15    Ca    9.4      05 Aug 2020 06:50  Phos  3.4     08-05  Mg     1.7     08-05    TPro  5.8<L>  /  Alb  3.3  /  TBili  0.4  /  DBili  0.2  /  AST  19  /  ALT  23  /  AlkPhos  108  08-05    LIVER FUNCTIONS - ( 05 Aug 2020 06:50 )  Alb: 3.3 g/dL / Pro: 5.8 g/dL / ALK PHOS: 108 U/L / ALT: 23 U/L / AST: 19 U/L / GGT: x             Interval Diagnostic Studies: see sunrise for full report

## 2020-08-06 DIAGNOSIS — D89.9 DISORDER INVOLVING THE IMMUNE MECHANISM, UNSPECIFIED: ICD-10-CM

## 2020-08-06 LAB
ALBUMIN SERPL ELPH-MCNC: 3.5 G/DL — SIGNIFICANT CHANGE UP (ref 3.3–5)
ALP SERPL-CCNC: 118 U/L — SIGNIFICANT CHANGE UP (ref 40–120)
ALT FLD-CCNC: 28 U/L — SIGNIFICANT CHANGE UP (ref 10–45)
AMMONIA BLD-MCNC: 36 UMOL/L — SIGNIFICANT CHANGE UP (ref 11–55)
ANION GAP SERPL CALC-SCNC: 11 MMOL/L — SIGNIFICANT CHANGE UP (ref 5–17)
APTT BLD: 28.8 SEC — SIGNIFICANT CHANGE UP (ref 27.5–35.5)
AST SERPL-CCNC: 20 U/L — SIGNIFICANT CHANGE UP (ref 10–40)
BILIRUB DIRECT SERPL-MCNC: 0.2 MG/DL — SIGNIFICANT CHANGE UP (ref 0–0.2)
BILIRUB INDIRECT FLD-MCNC: 0.3 MG/DL — SIGNIFICANT CHANGE UP (ref 0.2–1)
BILIRUB SERPL-MCNC: 0.5 MG/DL — SIGNIFICANT CHANGE UP (ref 0.2–1.2)
BUN SERPL-MCNC: 35 MG/DL — HIGH (ref 7–23)
CALCIUM SERPL-MCNC: 9.4 MG/DL — SIGNIFICANT CHANGE UP (ref 8.4–10.5)
CHLORIDE SERPL-SCNC: 103 MMOL/L — SIGNIFICANT CHANGE UP (ref 96–108)
CO2 SERPL-SCNC: 24 MMOL/L — SIGNIFICANT CHANGE UP (ref 22–31)
CREAT SERPL-MCNC: 1.1 MG/DL — SIGNIFICANT CHANGE UP (ref 0.5–1.3)
EVEROLIMUS, WHOLE BLOOD RESULT: 5.9 NG/ML — SIGNIFICANT CHANGE UP (ref 3–8)
GLUCOSE BLDC GLUCOMTR-MCNC: 167 MG/DL — HIGH (ref 70–99)
GLUCOSE BLDC GLUCOMTR-MCNC: 215 MG/DL — HIGH (ref 70–99)
GLUCOSE BLDC GLUCOMTR-MCNC: 227 MG/DL — HIGH (ref 70–99)
GLUCOSE BLDC GLUCOMTR-MCNC: 248 MG/DL — HIGH (ref 70–99)
GLUCOSE SERPL-MCNC: 181 MG/DL — HIGH (ref 70–99)
HCT VFR BLD CALC: 26.7 % — LOW (ref 39–50)
HGB BLD-MCNC: 8.6 G/DL — LOW (ref 13–17)
INR BLD: 1.07 RATIO — SIGNIFICANT CHANGE UP (ref 0.88–1.16)
MAGNESIUM SERPL-MCNC: 1.8 MG/DL — SIGNIFICANT CHANGE UP (ref 1.6–2.6)
MCHC RBC-ENTMCNC: 31.5 PG — SIGNIFICANT CHANGE UP (ref 27–34)
MCHC RBC-ENTMCNC: 32.2 GM/DL — SIGNIFICANT CHANGE UP (ref 32–36)
MCV RBC AUTO: 97.8 FL — SIGNIFICANT CHANGE UP (ref 80–100)
MISCELLANEOUS TEST NAME: SIGNIFICANT CHANGE UP
MISCELLANEOUS TEST NAME: SIGNIFICANT CHANGE UP
MISCELLANEOUS, NORMALS: SIGNIFICANT CHANGE UP
MISCELLANEOUS, RESULT: SIGNIFICANT CHANGE UP
NRBC # BLD: 0 /100 WBCS — SIGNIFICANT CHANGE UP (ref 0–0)
PHOSPHATE SERPL-MCNC: 3.2 MG/DL — SIGNIFICANT CHANGE UP (ref 2.5–4.5)
PLATELET # BLD AUTO: 114 K/UL — LOW (ref 150–400)
POTASSIUM SERPL-MCNC: 4.6 MMOL/L — SIGNIFICANT CHANGE UP (ref 3.5–5.3)
POTASSIUM SERPL-SCNC: 4.6 MMOL/L — SIGNIFICANT CHANGE UP (ref 3.5–5.3)
PROCALCITONIN SERPL-MCNC: 0.21 NG/ML — HIGH (ref 0.02–0.1)
PROT SERPL-MCNC: 6 G/DL — SIGNIFICANT CHANGE UP (ref 6–8.3)
PROTHROM AB SERPL-ACNC: 12.7 SEC — SIGNIFICANT CHANGE UP (ref 10.6–13.6)
RBC # BLD: 2.73 M/UL — LOW (ref 4.2–5.8)
RBC # FLD: 17.7 % — HIGH (ref 10.3–14.5)
SODIUM SERPL-SCNC: 138 MMOL/L — SIGNIFICANT CHANGE UP (ref 135–145)
WBC # BLD: 2.4 K/UL — LOW (ref 3.8–10.5)
WBC # FLD AUTO: 2.4 K/UL — LOW (ref 3.8–10.5)

## 2020-08-06 PROCEDURE — 99232 SBSQ HOSP IP/OBS MODERATE 35: CPT

## 2020-08-06 PROCEDURE — 99232 SBSQ HOSP IP/OBS MODERATE 35: CPT | Mod: GC

## 2020-08-06 RX ORDER — INSULIN LISPRO 100/ML
2 VIAL (ML) SUBCUTANEOUS
Refills: 0 | Status: DISCONTINUED | OUTPATIENT
Start: 2020-08-06 | End: 2020-08-07

## 2020-08-06 RX ADMIN — NYSTATIN CREAM 1 APPLICATION(S): 100000 CREAM TOPICAL at 17:01

## 2020-08-06 RX ADMIN — MYCOPHENOLATE MOFETIL 750 MILLIGRAM(S): 250 CAPSULE ORAL at 08:11

## 2020-08-06 RX ADMIN — Medication 1 APPLICATION(S): at 11:57

## 2020-08-06 RX ADMIN — Medication 5 MILLIGRAM(S): at 05:33

## 2020-08-06 RX ADMIN — INSULIN GLARGINE 5 UNIT(S): 100 INJECTION, SOLUTION SUBCUTANEOUS at 22:03

## 2020-08-06 RX ADMIN — TAMSULOSIN HYDROCHLORIDE 0.8 MILLIGRAM(S): 0.4 CAPSULE ORAL at 20:23

## 2020-08-06 RX ADMIN — EVEROLIMUS 3.5 MILLIGRAM(S): 10 TABLET ORAL at 20:26

## 2020-08-06 RX ADMIN — Medication 2: at 12:02

## 2020-08-06 RX ADMIN — NYSTATIN CREAM 1 APPLICATION(S): 100000 CREAM TOPICAL at 05:34

## 2020-08-06 RX ADMIN — Medication 50 MILLIGRAM(S): at 20:27

## 2020-08-06 RX ADMIN — MYCOPHENOLATE MOFETIL 750 MILLIGRAM(S): 250 CAPSULE ORAL at 20:23

## 2020-08-06 RX ADMIN — PANTOPRAZOLE SODIUM 40 MILLIGRAM(S): 20 TABLET, DELAYED RELEASE ORAL at 05:33

## 2020-08-06 RX ADMIN — Medication 500000 UNIT(S): at 11:58

## 2020-08-06 RX ADMIN — Medication 2 UNIT(S): at 17:00

## 2020-08-06 RX ADMIN — Medication 1 TABLET(S): at 11:58

## 2020-08-06 RX ADMIN — Medication 1 DROP(S): at 08:06

## 2020-08-06 RX ADMIN — Medication 2: at 17:00

## 2020-08-06 RX ADMIN — Medication 500000 UNIT(S): at 17:00

## 2020-08-06 RX ADMIN — Medication 1 DROP(S): at 20:24

## 2020-08-06 RX ADMIN — Medication 500000 UNIT(S): at 22:07

## 2020-08-06 RX ADMIN — Medication 2 UNIT(S): at 12:01

## 2020-08-06 RX ADMIN — Medication 1: at 08:02

## 2020-08-06 RX ADMIN — Medication 500000 UNIT(S): at 05:33

## 2020-08-06 RX ADMIN — MAGNESIUM OXIDE 400 MG ORAL TABLET 800 MILLIGRAM(S): 241.3 TABLET ORAL at 08:04

## 2020-08-06 RX ADMIN — Medication 81 MILLIGRAM(S): at 11:58

## 2020-08-06 RX ADMIN — EVEROLIMUS 3.5 MILLIGRAM(S): 10 TABLET ORAL at 08:14

## 2020-08-06 RX ADMIN — Medication 2: at 22:03

## 2020-08-06 RX ADMIN — MAGNESIUM OXIDE 400 MG ORAL TABLET 800 MILLIGRAM(S): 241.3 TABLET ORAL at 17:01

## 2020-08-06 RX ADMIN — VALGANCICLOVIR 900 MILLIGRAM(S): 450 TABLET, FILM COATED ORAL at 11:58

## 2020-08-06 RX ADMIN — Medication 500000 UNIT(S): at 05:26

## 2020-08-06 NOTE — PROGRESS NOTE ADULT - ASSESSMENT
64 M hx of DM, HLD, GERD, HFpEF with mild LV diastolic dysfunction, and decompensated JEAN cirrhosis c/b ascites with hx of SBP, HE, duodenal ulcer, GAVE and duodenal AVM s/p APC (last on 10/11/19), s/p liver transplant this admission (7/2/20). Post-op course c/b tacrolimus toxicity and hospital derlirium, improving after stopping CNI and replacing w/ everolimus.     Impression:  #S/p liver transplant 7/2: liver enzymes continue to downtrend  OR details:  - L groin cutdown for vv bypass   - anastomosis: bicaval end-end/CBD duct-duct/HA & PV end-end, all standard anatomy.    - IOC with good flow  - Simulect induction. 500mg Solumedrol  - JPs x3 with T-Tube  - 14U pRBC, 14U FFP, 10 PLT, 11 CRYO, 500mL returned from cell saver, EBL 5L, UOP 1100mL  Recipient Info:   ABO: A  CMV:  Neg  EBV Positive  Donor:  Donor ID:  [id number] Match: [id number]  Age: 29 ABO:  A1 High Risk:   No COD: Cardiovascular  Anti CMV positive, EBV IgG- positive  HepBcAb-neg, Hepatitis C-DANA- neg, Hepatitis C ab-neg    #Halluciniations/agitation/AMS/tremors – due to CNI toxicity, improving now that pt is off CNI and on everolimus  #Sacral decubitus ulcer: stage I, does not appear infected  #GI bleed: Resolved. Hgb stable with no overt bleeding post-transplant. 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 GAVE and PHG likely improved pos ttransplant.  #R posterior heel wound w/ overlying eschar –Local wound care. no signs of infections, XR neg for gas, evaluated by podiatry and ID. MRI w/o contrast limited, but no overt signs of active infection.  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     Recommendation:  - continue with everolimus dosed per transplant surgery, f/u levels (3.5mg BID)  - continue Cellcept dosing as per transplant surgery  - continue prednisone 5 mg PO daily  - continue w/ PPI daily  - frequent reorientation and visitation   - normalized sleep/wake cycle environment  - continue nystatin, Bactrim and Valcyte ppx  - physical therapy daily/BID  - continue with mag oxide 200 mg BID  - monitor Hb on daily CBC - continue PO PPI BID, monitor bowel movements  - c/w ASA 81mg daily  - continue to hold diuretics  - follow up Wound care recommendations for R heel ulcer and sacral decub  - follow up other Transplant surgery recommendations       Ze Dominguez  Gastroenterology Fellow  AT NIGHT AND ON WEEKENDS:  Contact on-call GI fellow via answering service ([phone]) from 5pm-8am and on weekends/holidays  MONDAY-FRIDAY 8AM-5PM:  Available via [employer] Teams  Call [phone] ([facility]) or Page [phone] ([facility]) from 8am-5pm M-F

## 2020-08-06 NOTE — PROGRESS NOTE ADULT - SUBJECTIVE AND OBJECTIVE BOX
Follow Up:  s/p olt, encephalopathy    Interval History: afebrile overnight. still somewhat sleepy this AM. denies any abdominal pain.     REVIEW OF SYSTEMS  [  ] ROS unobtainable because:    [  x] All other systems negative except as noted below    Constitutional:  [ ] fever [ ] chills  [ ] weight loss  [ ] weakness  Skin:  [ ] rash [ ] phlebitis	  Eyes: [ ] icterus [ ] pain  [ ] discharge	  ENMT: [ ] sore throat  [ ] thrush [ ] ulcers [ ] exudates  Respiratory: [ ] dyspnea [ ] hemoptysis [ ] cough [ ] sputum	  Cardiovascular:  [ ] chest pain [ ] palpitations [ ] edema	  Gastrointestinal:  [ ] nausea [ ] vomiting [ ] diarrhea [ ] constipation [ ] pain	  Genitourinary:  [ ] dysuria [ ] frequency [ ] hematuria [ ] discharge [ ] flank pain  [ ] incontinence  Musculoskeletal:  [ ] myalgias [ ] arthralgias [ ] arthritis  [ ] back pain  Neurological:  [ ] headache [ ] seizures  [ ] confusion/altered mental status    Allergies  codeine (Anaphylaxis)        ANTIMICROBIALS:  nystatin    Suspension 076000 four times a day  trimethoprim   80 mG/sulfamethoxazole 400 mG 1 daily  valGANciclovir 900 daily      OTHER MEDS:  MEDICATIONS  (STANDING):  aspirin enteric coated 81 daily  diphenhydrAMINE 50 <User Schedule> PRN  everolimus (ZORTRESS) 3.5 <User Schedule>  insulin glargine Injectable (LANTUS) 5 at bedtime  insulin lispro (HumaLOG) corrective regimen sliding scale  Before meals and at bedtime  insulin lispro Injectable (HumaLOG) 2 three times a day before meals  mycophenolate mofetil 750 <User Schedule>  pantoprazole    Tablet 40 before breakfast  predniSONE   Tablet 5 daily  tamsulosin 0.8 at bedtime      Vital Signs Last 24 Hrs  T(C): 36.4 (06 Aug 2020 09:15), Max: 37.2 (05 Aug 2020 20:48)  T(F): 97.5 (06 Aug 2020 09:15), Max: 98.9 (05 Aug 2020 20:48)  HR: 87 (06 Aug 2020 09:15) (79 - 96)  BP: 126/71 (06 Aug 2020 09:15) (124/63 - 139/67)  BP(mean): --  RR: 18 (06 Aug 2020 09:15) (18 - 19)  SpO2: 94% (06 Aug 2020 09:15) (94% - 100%)    PHYSICAL EXAMINATION:  General: Alert and Awake, NAD  HEENT: PERRL, EOMI  Neck: Supple  Cardiac: RRR, No M/R/G  Resp: CTAB, No Wh/Rh/Ra  Abdomen: Surgical site from OLT is clean, without erythema and without drainage. NBS, NT/ND, No HSM, No rigidity or guarding  MSK: No LE edema. No Calf tenderness  : No bob  Skin: No rashes or lesions. Skin is warm and dry to the touch.   Neuro: Alert and Awake. CN 2-12 Grossly intact. Moves all four extremities spontaneously.  Psych: Calm, Pleasant, Cooperative                          8.6    2.40  )-----------( 114      ( 06 Aug 2020 06:09 )             26.7       08-06    138  |  103  |  35<H>  ----------------------------<  181<H>  4.6   |  24  |  1.10    Ca    9.4      06 Aug 2020 06:10  Phos  3.2     08-06  Mg     1.8     08-06    TPro  6.0  /  Alb  3.5  /  TBili  0.5  /  DBili  0.2  /  AST  20  /  ALT  28  /  AlkPhos  118  08-06          MICROBIOLOGY:  v  .Blood Blood  07-20-20   No Growth Final  --  --      .Urine Clean Catch (Midstream)  07-20-20   No growth  --  --      .Body Fluid Abdominal Fluid  07-14-20   No growth at 5 days  --    polymorphonuclear leukocytes seen  No organisms seen  by cytocentrifuge      .Blood Blood-Peripheral  07-12-20   No Growth Final  --  --      .Urine Catheterized  07-11-20   No growth  --  --      .Blood Blood-Peripheral  07-10-20   Growth in aerobic bottle: Enterococcus faecium (vancomycin resistant)  "Due to technical problems, Proteus sp. will Not be reported as part of  the BCID panel until further notice"  ***Blood Panel PCR results on this specimen are available  approximately 3 hours after the Gram stain result.***  Gram stain, PCR, and/or culture results may not always  correspond due to difference in methodologies.  ************************************************************  This PCR assay was performed using [employer].  The following targets are tested for: Enterococcus,  vancomycin resistant enterococci, Listeria monocytogenes,  coagulase negative staphylococci, S. aureus,  methicillin resistant S. aureus, Streptococcus agalactiae  (Group B), S. pneumoniae, S. pyogenes (Group A),  Acinetobacter baumannii, Enterobacter cloacae, E. coli,  Klebsiella oxytoca, K. pneumoniae, Proteus sp.,  Serratia marcescens, Haemophilus influenzae,  Neisseria meningitidis, Pseudomonas aeruginosa, Candida  albicans, C. glabrata, C krusei, C parapsilosis,  C. tropicalis and the KPC resistance gene.  --  Blood Culture PCR  Enterococcus faecium (vancomycin resistant)        CMV IgG Antibody: <0.20 U/mL (12-04-19 @ 08:33)  Toxoplasma IgG Screen: <3.0 IU/mL (12-04-19 @ 08:33)          RADIOLOGY:    <The imaging below has been reviewed and visualized by me independently. Findings as detailed in report below>      EXAM:  CT BRAIN                        PROCEDURE DATE:  07/31/2020    Slight progression to atrophic changes compared with 6/11/2020 which may be the result of treatment-related changes. No other significant pathology

## 2020-08-06 NOTE — PROGRESS NOTE ADULT - SUBJECTIVE AND OBJECTIVE BOX
GASTROENTEROLOGY    Patient seen and examined  doing well, wants to eat more        MEDICATIONS  (STANDING):  aspirin enteric coated 81 milliGRAM(s) Oral daily  chlorhexidine 2% Cloths 1 Application(s) Topical <User Schedule>  collagenase Ointment 1 Application(s) Topical daily  diphenhydrAMINE 50 milliGRAM(s) Oral at bedtime  everolimus (ZORTRESS) 3.5 milliGRAM(s) Oral <User Schedule>  insulin lispro (HumaLOG) corrective regimen sliding scale   SubCutaneous Before meals and at bedtime  magnesium oxide 800 milliGRAM(s) Oral two times a day with meals  mycophenolate mofetil 750 milliGRAM(s) Oral <User Schedule>  nystatin    Suspension 288452 Unit(s) Oral four times a day  nystatin Cream 1 Application(s) Topical two times a day  pantoprazole    Tablet 40 milliGRAM(s) Oral before breakfast  predniSONE   Tablet 5 milliGRAM(s) Oral daily  tamsulosin 0.8 milliGRAM(s) Oral at bedtime  trimethoprim   80 mG/sulfamethoxazole 400 mG 1 Tablet(s) Oral daily  valGANciclovir 50 mG/mL Oral Solution 900 milliGRAM(s) Oral daily        T(F): 98.1 (08-02-20 @ 05:13), Max: 98.3 (08-01-20 @ 09:00)  HR: 75 (08-02-20 @ 05:13) (75 - 85)  BP: 133/64 (08-02-20 @ 05:13) (103/55 - 136/67)  RR: 18 (08-02-20 @ 05:13) (18 - 18)  SpO2: 98% (08-02-20 @ 05:13) (96% - 100%)  Wt(kg): --    PHYSICAL EXAM  GENERAL:   NAD  HEENT:  NC/AT, no JVD, sclera-anicteric  ABDOMEN:  Soft, non-distended, (+) bowel sounds, incision c/d/i  EXTREMITIES:  + peripheral edema  NEURO:  tired but able to answer questions appropriately   SKIN:  No rash/warm/dry      LABS:                        8.6<L>  3.18<L> )-----------( 114<L>    ( 02 Aug 2020 06:17 )             25.8<L>  02 Aug 2020 06:17    08-02    139  |  106  |  31<H>  ----------------------------<  162<H>  4.7   |  21<L>  |  1.09    Ca    9.1      02 Aug 2020 06:17  Phos  3.2     08-02  Mg     1.7     08-02    TPro  5.3<L>  /  Alb  3.2<L>  /  TBili  0.6  /  DBili  0.2  /  AST  15  /  ALT  23  /  AlkPhos  102  08-02    LIVER FUNCTIONS - ( 02 Aug 2020 06:17 )  Alb: 3.2 g/dL / Pro: 5.3 g/dL / ALK PHOS: 102 U/L / ALT: 23 U/L / AST: 15 U/L / GGT: x           PT/INR - ( 02 Aug 2020 06:17 )   PT: 12.4 sec;   INR: 1.04 ratio         PTT - ( 02 Aug 2020 06:17 )  PTT:29.2 sec  I&O's Detail    01 Aug 2020 07:01  -  02 Aug 2020 07:00  --------------------------------------------------------  IN:    Oral Fluid: 1500 mL    Solution: 100 mL  Total IN: 1600 mL    OUT:    Voided: 800 mL  Total OUT: 800 mL    Total NET: 800 mL

## 2020-08-06 NOTE — PROGRESS NOTE ADULT - ASSESSMENT
· Assessment		  64M w/ R posterior heel wound, stable  -Pt seen and evaluated  -R posterior heel wound to subQ stable w/ no signs of infection, improving in appearance   -XR negative for gas or OM  -No podiatric surgical intervention, will continue to monitor for signs of infection and wound care recommendations  -dress with santyl and allevyn pad daily and decubitus precautions  - follow up as outpatient upon discharge (call [phone] for appointment)

## 2020-08-06 NOTE — PROGRESS NOTE ADULT - SUBJECTIVE AND OBJECTIVE BOX
Podiatry pager #: [phone]/ [phone]    Patient is a 64y old  Male who presents with a chief complaint of Liver failure, hepatic encephalopathy (06 Aug 2020 15:04) podiatry seen for f/u heel ulceration       INTERVAL HPI/OVERNIGHT EVENTS:  Patient seen and evaluated at bedside.  Pt is resting comfortable in NAD. Denies N/V/F/C.  Pain rated at X/10    Allergies    codeine (Anaphylaxis)    Intolerances        Vital Signs Last 24 Hrs  T(C): 36.1 (06 Aug 2020 13:08), Max: 37.2 (05 Aug 2020 20:48)  T(F): 97 (06 Aug 2020 13:08), Max: 98.9 (05 Aug 2020 20:48)  HR: 85 (06 Aug 2020 13:08) (79 - 96)  BP: 106/56 (06 Aug 2020 13:08) (106/56 - 139/65)  BP(mean): --  RR: 18 (06 Aug 2020 13:08) (18 - 19)  SpO2: 100% (06 Aug 2020 13:08) (94% - 100%)    artificial  tears Solution 1 Drop(s) Both EYES two times a day PRN  aspirin enteric coated 81 milliGRAM(s) Oral daily  chlorhexidine 2% Cloths 1 Application(s) Topical <User Schedule>  collagenase Ointment 1 Application(s) Topical daily  diphenhydrAMINE 50 milliGRAM(s) Oral <User Schedule> PRN  everolimus (ZORTRESS) 3.5 milliGRAM(s) Oral <User Schedule>  insulin glargine Injectable (LANTUS) 5 Unit(s) SubCutaneous at bedtime  insulin lispro (HumaLOG) corrective regimen sliding scale   SubCutaneous Before meals and at bedtime  insulin lispro Injectable (HumaLOG) 2 Unit(s) SubCutaneous three times a day before meals  magnesium oxide 800 milliGRAM(s) Oral two times a day with meals  mycophenolate mofetil 750 milliGRAM(s) Oral <User Schedule>  nystatin    Suspension 883712 Unit(s) Oral four times a day  nystatin Cream 1 Application(s) Topical two times a day  pantoprazole    Tablet 40 milliGRAM(s) Oral before breakfast  predniSONE   Tablet 5 milliGRAM(s) Oral daily  tamsulosin 0.8 milliGRAM(s) Oral at bedtime  trimethoprim   80 mG/sulfamethoxazole 400 mG 1 Tablet(s) Oral daily  valGANciclovir 900 milliGRAM(s) Oral daily      LABS:                        8.6    2.40  )-----------( 114      ( 06 Aug 2020 06:09 )             26.7     08-06    138  |  103  |  35<H>  ----------------------------<  181<H>  4.6   |  24  |  1.10    Ca    9.4      06 Aug 2020 06:10  Phos  3.2     08-06  Mg     1.8     08-06    TPro  6.0  /  Alb  3.5  /  TBili  0.5  /  DBili  0.2  /  AST  20  /  ALT  28  /  AlkPhos  118  08-06    PT/INR - ( 06 Aug 2020 06:10 )   PT: 12.7 sec;   INR: 1.07 ratio         PTT - ( 06 Aug 2020 06:10 )  PTT:28.8 sec    CAPILLARY BLOOD GLUCOSE      POCT Blood Glucose.: 215 mg/dL (06 Aug 2020 11:52)  POCT Blood Glucose.: 167 mg/dL (06 Aug 2020 07:21)  POCT Blood Glucose.: 281 mg/dL (05 Aug 2020 21:12)  POCT Blood Glucose.: 234 mg/dL (05 Aug 2020 17:02)      Lower Extremity Physical Exam:  Vascular: DP 2/4 PT 1/4 B/L, CFT <3 seconds B/L, Temperature gradient warm to cool B/L  Neuro: Epicritic sensation diminished to the level of heel B/L  Musculoskeletal/Ortho: no gross deformities  Skin:   Wound #1: right foot  Location: posterior heel  Size: 4cm x 4 cm  Depth: subQ  Wound bed: granular tissue   Drainage: none  Odor: none  Periwound: no clinical signs of infection  Etiology: pressure, diabetes    RADIOLOGY & ADDITIONAL TESTS:

## 2020-08-06 NOTE — PROGRESS NOTE ADULT - ASSESSMENT
65 y/o M PMHx decompensated JEAN Cirrhosis on transplant list, DMII, HFpEF, recent admission for ESBL E coli prostatic abscess 2/2 4 wks ertapenem, hx of ESBL UTIs with prostate abscess s/p IV abx, recent VRE urinary colonization came to hospital for worsening jaundice and episode of confusion, resolved PTA. s/p treatment course for possible VRE UTI. Now s/p OLT on 7/2/20. Course complicated by Encephalopathy and Tremors. Blood Cultures (7/10) with VRE sensitive to Linezolid. CT C/A/P without obvious intraabdominal source. Hypoxia prior to positive BCx which may be related to sepsis CT Chest (7/11) without evidence of pneumonia. Had L groin fluid collection which appears to be consistent with seroma - sample obtained for culture also negative. No drainage, erythema or underlying fluctuance was appreciated at the right heel. MRI prior to transplant without obvious infection. Transferred from [facility] to [facility] on 7/17/2020.    Currently on:  nystatin    Suspension 839265 four times a day  trimethoprim   80 mG/sulfamethoxazole 400 mG 1 daily  valGANciclovir 900 daily    #Leukopenia  - Continue to monitor counts    #Encephalopathy (improving)  - Switched to Everolimus on 7/27 to avoid CNI CNS Toxicity   - CT result(7/31): Slight progression to atrophic changes compared with 6/11/2020 which may be the result of treatment-related changes. No other significant pathology  - Continue to monitor    #VRE Bacteremia, L Groin Collection, Encephalopathy (improving)  - s/p Linezolid and Daptomycin ended on 7/26 for VRE bacteremia post-OLT  - Abd US showed 9cm Left groin collection: likely represents a seroma or lymphocele; aspirated - cx: PMN seen, no organism seen  - Continue monitoring cylosporine levels  - TTE negative for endocarditis  - UA/UC on 7/20 no UTI; UA on 7/30 negative  - BCx on 7/20 NGTD    #s/p OLT   - Continue bactrim, valcyte (CMV D+/R-) for prophylaxis  - Monitor patient for tremors - was on tacrolimus(CI), then cylosporine(CI), now on Everolimus(mTor I)    #Right heel wound  --Podiatry on board    --Local wound care     I will continue to follow. Please feel free to contact me with any further questions.    Eusebio Pérez M.D.  [facility] Division of Infectious Disease  8AM-5PM: Pager Number [phone]  After Hours (or if no response): Please contact the Infectious Diseases Office at [phone]     The above assessment and plan were discussed with Xiao, Transplant Surgery NP

## 2020-08-06 NOTE — PROGRESS NOTE ADULT - SUBJECTIVE AND OBJECTIVE BOX
TRANSPLANT SURGERY MULTIDISCIPLINARY NOTE    OLTx      Date:  7/2/2020       POD #35    Present:  Patient seen and examined with multidisciplinary team including Transplant Surgeon: Dr. Ackerman, Dr. Chris, Transplant Nephrologist: Dr. Robin Patel, Pharmacist: Jessica Nagel. NP Xiao Hurley and unit RN during am rounds and examined by Dr. Ackerman.  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     Re-admitted on 6/11:   ·	worsening HE  ·	UTI/VRE: tx with linezolid 6/15-22, bactrim DS 6/22 - 7/2  ·	MISA/Hyponatremia  ·	UGI bleed (melena) s/p EGD (6/22) c/w hemorrhagic duodenitis/gastritis; Grade 2 EV; requiring multiple transfusions  ·	Known h/o R foot ulcer (MRI neg for OM)  ·	s/p OLTx on 7/2/2020, post op liver doppler with patent vessels  ·	post op course c/b VRE bacteremia (7/10), tx with linezolid 7/10-7/23, dapto 7/23-7/26  ·	post op course c/b delirium likely in setting of CNI toxicity; CT head neg (7/31); off FK 7/19, off cylco 7/27. Everolimus initiated 7/27     Interval events:  - no overnight events; afebrile  - Awake and alert this AM. Appropriate and conversant  - Stable liver function tests  - Tolerating PO intake, no N/V  - Incontinent with BM  - WBC trending down 2.40 from 2.98    Potential Discharge date: pending clinical improvement, Sunday/Monday     Education:  Medications    Plan of care:  See Below      MEDICATIONS  (STANDING):  aspirin enteric coated 81 milliGRAM(s) Oral daily  chlorhexidine 2% Cloths 1 Application(s) Topical <User Schedule>  collagenase Ointment 1 Application(s) Topical daily  everolimus (ZORTRESS) 3.5 milliGRAM(s) Oral <User Schedule>  insulin glargine Injectable (LANTUS) 5 Unit(s) SubCutaneous at bedtime  insulin lispro (HumaLOG) corrective regimen sliding scale   SubCutaneous Before meals and at bedtime  insulin lispro Injectable (HumaLOG) 2 Unit(s) SubCutaneous three times a day before meals  magnesium oxide 800 milliGRAM(s) Oral two times a day with meals  mycophenolate mofetil 750 milliGRAM(s) Oral <User Schedule>  nystatin    Suspension 927152 Unit(s) Oral four times a day  nystatin Cream 1 Application(s) Topical two times a day  pantoprazole    Tablet 40 milliGRAM(s) Oral before breakfast  predniSONE   Tablet 5 milliGRAM(s) Oral daily  tamsulosin 0.8 milliGRAM(s) Oral at bedtime  trimethoprim   80 mG/sulfamethoxazole 400 mG 1 Tablet(s) Oral daily  valGANciclovir 900 milliGRAM(s) Oral daily    MEDICATIONS  (PRN):  artificial  tears Solution 1 Drop(s) Both EYES two times a day PRN Dry Eyes  diphenhydrAMINE 50 milliGRAM(s) Oral <User Schedule> PRN Rash and/or Itching      PAST MEDICAL & SURGICAL HISTORY:  GIB (gastrointestinal bleeding)  GERD with esophagitis: Gastritis &amp; Non Bleeding Ulcers  Hepatic encephalopathy  Obesity  Fatty liver disease, nonalcoholic  Renal stones: 25 years ago  Hypertension  Neuropathy  Hypercholesteremia  Diabetes  S/P cholecystectomy      Vital Signs Last 24 Hrs  T(C): 36.4 (06 Aug 2020 09:15), Max: 37.2 (05 Aug 2020 20:48)  T(F): 97.5 (06 Aug 2020 09:15), Max: 98.9 (05 Aug 2020 20:48)  HR: 87 (06 Aug 2020 09:15) (79 - 96)  BP: 126/71 (06 Aug 2020 09:15) (124/63 - 139/67)  BP(mean): --  RR: 18 (06 Aug 2020 09:15) (18 - 19)  SpO2: 94% (06 Aug 2020 09:15) (94% - 100%)    I&O's Summary    05 Aug 2020 07:01  -  06 Aug 2020 07:00  --------------------------------------------------------  IN: 1020 mL / OUT: 1750 mL / NET: -730 mL    06 Aug 2020 07:01  -  06 Aug 2020 11:01  --------------------------------------------------------  IN: 240 mL / OUT: 0 mL / NET: 240 mL                              8.6    2.40  )-----------( 114      ( 06 Aug 2020 06:09 )             26.7     08-06    138  |  103  |  35<H>  ----------------------------<  181<H>  4.6   |  24  |  1.10    Ca    9.4      06 Aug 2020 06:10  Phos  3.2     08-06  Mg     1.8     08-06    TPro  6.0  /  Alb  3.5  /  TBili  0.5  /  DBili  0.2  /  AST  20  /  ALT  28  /  AlkPhos  118  08-06      Review of symptoms:   Gen: no weight changes  Skin: no rashes  Head/Eyes/Ears/Mouth: no headache, normal hearing, no changes to vision  Respiratory: no dyspnea, cough  CV: No chest pain, PND, orthopnea  GI: stool incontinence  : no pain on urination  MSK: no joint swelling  Neuro: No dizziness  Heme: no easy bruising or bleeding   Endo: no heat/cold intolerance  Psych: no significant nervousness, anxiety, depression    Physical Exam  Constitutional: NAD/WD/WN  Eyes: Anicteric, PERRLA  ENMT: nc/at. no thrush or ulcerations appreciated  Neck: no JVD, supple  Respiratory: CTA B/L, unlabored breathing   Cardiovascular: RRR  Gastrointestinal: Soft abdomen, non tender to touch at surgical site, ND  Genitourinary: Voiding spontaneously  Extremities: SCD's in place and working bilaterally  Vascular: Palpable dp pulses bilaterally  Neurological: A&O x2-3, following commands   Musculoskeletal: Moving all extremities  Psychiatric: Responsive

## 2020-08-06 NOTE — PROGRESS NOTE ADULT - SUBJECTIVE AND OBJECTIVE BOX
Chief Complaint:  Patient is a 64y old  Male who presents with a chief complaint of Liver failure, hepatic encephalopathy (05 Aug 2020 17:53)    Reason for consult: s/o OLT    Interval Events: Pt continues to improve, no other events    Hospital Medications:  artificial  tears Solution 1 Drop(s) Both EYES two times a day PRN  aspirin enteric coated 81 milliGRAM(s) Oral daily  chlorhexidine 2% Cloths 1 Application(s) Topical <User Schedule>  collagenase Ointment 1 Application(s) Topical daily  diphenhydrAMINE 50 milliGRAM(s) Oral <User Schedule> PRN  everolimus (ZORTRESS) 3.5 milliGRAM(s) Oral <User Schedule>  insulin glargine Injectable (LANTUS) 5 Unit(s) SubCutaneous at bedtime  insulin lispro (HumaLOG) corrective regimen sliding scale   SubCutaneous Before meals and at bedtime  magnesium oxide 800 milliGRAM(s) Oral two times a day with meals  mycophenolate mofetil 750 milliGRAM(s) Oral <User Schedule>  nystatin    Suspension 113107 Unit(s) Oral four times a day  nystatin Cream 1 Application(s) Topical two times a day  pantoprazole    Tablet 40 milliGRAM(s) Oral before breakfast  predniSONE   Tablet 5 milliGRAM(s) Oral daily  tamsulosin 0.8 milliGRAM(s) Oral at bedtime  trimethoprim   80 mG/sulfamethoxazole 400 mG 1 Tablet(s) Oral daily  valGANciclovir 900 milliGRAM(s) Oral daily      ROS:   General:  No  fevers, chills, night sweats, fatigue  Eyes:  Good vision, no reported pain  ENT:  No sore throat, pain, runny nose  CV:  No pain, palpitations  Pulm:  No dyspnea, cough  GI:  See HPI, otherwise negative  :  No  incontinence, nocturia  Muscle:  No pain, weakness  Neuro:  No memory problems  Psych:  No insomnia, mood problems, depression  Endocrine:  No polyuria, polydipsia, cold/heat intolerance  Heme:  No petechiae, ecchymosis, easy bruisability  Skin:  No rash    PHYSICAL EXAM:   Vital Signs:  Vital Signs Last 24 Hrs  T(C): 36.7 (06 Aug 2020 05:00), Max: 37.2 (05 Aug 2020 20:48)  T(F): 98.1 (06 Aug 2020 05:00), Max: 98.9 (05 Aug 2020 20:48)  HR: 90 (06 Aug 2020 05:58) (79 - 96)  BP: 139/65 (06 Aug 2020 05:00) (124/63 - 139/67)  BP(mean): --  RR: 18 (06 Aug 2020 05:00) (18 - 19)  SpO2: 97% (06 Aug 2020 05:58) (97% - 100%)  Daily     Daily Weight in k.4 (06 Aug 2020 05:00)    GENERAL: no acute distress  NEURO: alert, no asterixis  HEENT: anicteric sclera, no conjunctival pallor appreciated  CHEST: no respiratory distress, no accessory muscle use  CARDIAC: regular rate, rhythm  ABDOMEN: soft, non-tender, non-distended, no rebound or guarding  EXTREMITIES: warm, well perfused, no edema  SKIN: no lesions noted    LABS: reviewed                        8.6    2.40  )-----------( 114      ( 06 Aug 2020 06:09 )             26.7     08-06    138  |  103  |  35<H>  ----------------------------<  181<H>  4.6   |  24  |  1.10    Ca    9.4      06 Aug 2020 06:10  Phos  3.2     08-06  Mg     1.8     08-06    TPro  6.0  /  Alb  3.5  /  TBili  0.5  /  DBili  0.2  /  AST  20  /  ALT  28  /  AlkPhos  118  08-06    LIVER FUNCTIONS - ( 06 Aug 2020 06:10 )  Alb: 3.5 g/dL / Pro: 6.0 g/dL / ALK PHOS: 118 U/L / ALT: 28 U/L / AST: 20 U/L / GGT: x             Interval Diagnostic Studies: see sunrise for full report

## 2020-08-06 NOTE — PROGRESS NOTE ADULT - ASSESSMENT
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 c/b VRE bactermia and delirium    [] POD 34 s/p OLT   - Good graft function, LFTs stable   - Immuno:  Everolimus 3.5mg bid, MMF 750mg bid, Pred 5     -->Tacro DC'd 7/19. Cyclosporine DC'd 7/27.  Changed to Everolimus on 7/27 due to persistent delirium/AMS, now with improvement     -->Daily Everolimus leve  - PPX: Valcyte/Nystatin/Bactrim  - Mental status improving slowly.  CT Head negative  - Diet: Regular, tolerating, PO intake improving  - Continue ASA 81mg daily  - h/o UGI bleed: Protonix 40mg daily  - DVT PPx: SCDs at all times  - Sacral decub: seen by  wound care, recs appreciated. Healing well.   - Pain control PRN  - keep Mag >2  - Will discuss options for sleep apnea study with pulmonary today. Continue nocturnal CPAP    [] VRE bacteremia (7/10)  - tx with linezolid 7/10-7/23,  Daptomycin 7/23-7/26   - BCx from 7/20 with NG, UCx (7/20) with NG     [] DM  - Appreciate Endocrinology recommendations  - Continue lantus 5u qhs and ISS  - Start premeal insulin 2 U Humalog  - Fingersticks ac and qhs    [] Heel ulcer  - Healing well. Keep open to air.   - Appreciate podiatry recommendations    [] Dispo:    - continue 1:1 with tele  - expect d/c home with home PT, potentially Sunday or Monday  - daily PT/OT (team aware)  - discharge planning in progress w/ Liver transplant

## 2020-08-07 DIAGNOSIS — F05 DELIRIUM DUE TO KNOWN PHYSIOLOGICAL CONDITION: ICD-10-CM

## 2020-08-07 LAB
ALBUMIN SERPL ELPH-MCNC: 3.3 G/DL — SIGNIFICANT CHANGE UP (ref 3.3–5)
ALP SERPL-CCNC: 111 U/L — SIGNIFICANT CHANGE UP (ref 40–120)
ALT FLD-CCNC: 30 U/L — SIGNIFICANT CHANGE UP (ref 10–45)
AMMONIA BLD-MCNC: 31 UMOL/L — SIGNIFICANT CHANGE UP (ref 11–55)
ANION GAP SERPL CALC-SCNC: 11 MMOL/L — SIGNIFICANT CHANGE UP (ref 5–17)
APTT BLD: 27.6 SEC — SIGNIFICANT CHANGE UP (ref 27.5–35.5)
AST SERPL-CCNC: 20 U/L — SIGNIFICANT CHANGE UP (ref 10–40)
BILIRUB DIRECT SERPL-MCNC: 0.2 MG/DL — SIGNIFICANT CHANGE UP (ref 0–0.2)
BILIRUB INDIRECT FLD-MCNC: 0.2 MG/DL — SIGNIFICANT CHANGE UP (ref 0.2–1)
BILIRUB SERPL-MCNC: 0.4 MG/DL — SIGNIFICANT CHANGE UP (ref 0.2–1.2)
BUN SERPL-MCNC: 38 MG/DL — HIGH (ref 7–23)
CALCIUM SERPL-MCNC: 8.8 MG/DL — SIGNIFICANT CHANGE UP (ref 8.4–10.5)
CHLORIDE SERPL-SCNC: 103 MMOL/L — SIGNIFICANT CHANGE UP (ref 96–108)
CO2 SERPL-SCNC: 24 MMOL/L — SIGNIFICANT CHANGE UP (ref 22–31)
CREAT SERPL-MCNC: 1.1 MG/DL — SIGNIFICANT CHANGE UP (ref 0.5–1.3)
GLUCOSE BLDC GLUCOMTR-MCNC: 181 MG/DL — HIGH (ref 70–99)
GLUCOSE BLDC GLUCOMTR-MCNC: 186 MG/DL — HIGH (ref 70–99)
GLUCOSE BLDC GLUCOMTR-MCNC: 214 MG/DL — HIGH (ref 70–99)
GLUCOSE BLDC GLUCOMTR-MCNC: 289 MG/DL — HIGH (ref 70–99)
GLUCOSE SERPL-MCNC: 215 MG/DL — HIGH (ref 70–99)
HCT VFR BLD CALC: 25.1 % — LOW (ref 39–50)
HGB BLD-MCNC: 8.1 G/DL — LOW (ref 13–17)
INR BLD: 1.01 RATIO — SIGNIFICANT CHANGE UP (ref 0.88–1.16)
MAGNESIUM SERPL-MCNC: 1.7 MG/DL — SIGNIFICANT CHANGE UP (ref 1.6–2.6)
MCHC RBC-ENTMCNC: 31.8 PG — SIGNIFICANT CHANGE UP (ref 27–34)
MCHC RBC-ENTMCNC: 32.3 GM/DL — SIGNIFICANT CHANGE UP (ref 32–36)
MCV RBC AUTO: 98.4 FL — SIGNIFICANT CHANGE UP (ref 80–100)
MISCELLANEOUS, NORMALS: SIGNIFICANT CHANGE UP
MISCELLANEOUS, RESULT: SIGNIFICANT CHANGE UP
NRBC # BLD: 0 /100 WBCS — SIGNIFICANT CHANGE UP (ref 0–0)
PHOSPHATE SERPL-MCNC: 2.9 MG/DL — SIGNIFICANT CHANGE UP (ref 2.5–4.5)
PLATELET # BLD AUTO: 100 K/UL — LOW (ref 150–400)
POTASSIUM SERPL-MCNC: 5 MMOL/L — SIGNIFICANT CHANGE UP (ref 3.5–5.3)
POTASSIUM SERPL-SCNC: 5 MMOL/L — SIGNIFICANT CHANGE UP (ref 3.5–5.3)
PROCALCITONIN SERPL-MCNC: 0.2 NG/ML — HIGH (ref 0.02–0.1)
PROT SERPL-MCNC: 5.6 G/DL — LOW (ref 6–8.3)
PROTHROM AB SERPL-ACNC: 12 SEC — SIGNIFICANT CHANGE UP (ref 10.6–13.6)
RBC # BLD: 2.55 M/UL — LOW (ref 4.2–5.8)
RBC # FLD: 17.2 % — HIGH (ref 10.3–14.5)
SODIUM SERPL-SCNC: 138 MMOL/L — SIGNIFICANT CHANGE UP (ref 135–145)
WBC # BLD: 2.65 K/UL — LOW (ref 3.8–10.5)
WBC # FLD AUTO: 2.65 K/UL — LOW (ref 3.8–10.5)

## 2020-08-07 PROCEDURE — 99232 SBSQ HOSP IP/OBS MODERATE 35: CPT | Mod: GC,24

## 2020-08-07 PROCEDURE — 99232 SBSQ HOSP IP/OBS MODERATE 35: CPT

## 2020-08-07 PROCEDURE — 99223 1ST HOSP IP/OBS HIGH 75: CPT

## 2020-08-07 PROCEDURE — 99232 SBSQ HOSP IP/OBS MODERATE 35: CPT | Mod: GC

## 2020-08-07 RX ORDER — INSULIN GLARGINE 100 [IU]/ML
6 INJECTION, SOLUTION SUBCUTANEOUS AT BEDTIME
Refills: 0 | Status: DISCONTINUED | OUTPATIENT
Start: 2020-08-07 | End: 2020-08-11

## 2020-08-07 RX ORDER — EVEROLIMUS 10 MG/1
4 TABLET ORAL
Refills: 0 | Status: DISCONTINUED | OUTPATIENT
Start: 2020-08-07 | End: 2020-08-09

## 2020-08-07 RX ORDER — INSULIN LISPRO 100/ML
4 VIAL (ML) SUBCUTANEOUS
Refills: 0 | Status: DISCONTINUED | OUTPATIENT
Start: 2020-08-07 | End: 2020-08-08

## 2020-08-07 RX ADMIN — PANTOPRAZOLE SODIUM 40 MILLIGRAM(S): 20 TABLET, DELAYED RELEASE ORAL at 05:40

## 2020-08-07 RX ADMIN — Medication 1 TABLET(S): at 12:19

## 2020-08-07 RX ADMIN — Medication 1: at 07:55

## 2020-08-07 RX ADMIN — Medication 3: at 17:17

## 2020-08-07 RX ADMIN — Medication 1 APPLICATION(S): at 12:20

## 2020-08-07 RX ADMIN — NYSTATIN CREAM 1 APPLICATION(S): 100000 CREAM TOPICAL at 17:21

## 2020-08-07 RX ADMIN — Medication 500000 UNIT(S): at 12:19

## 2020-08-07 RX ADMIN — Medication 4 UNIT(S): at 12:18

## 2020-08-07 RX ADMIN — Medication 500000 UNIT(S): at 17:21

## 2020-08-07 RX ADMIN — MYCOPHENOLATE MOFETIL 750 MILLIGRAM(S): 250 CAPSULE ORAL at 07:58

## 2020-08-07 RX ADMIN — MAGNESIUM OXIDE 400 MG ORAL TABLET 800 MILLIGRAM(S): 241.3 TABLET ORAL at 07:56

## 2020-08-07 RX ADMIN — EVEROLIMUS 3.5 MILLIGRAM(S): 10 TABLET ORAL at 08:11

## 2020-08-07 RX ADMIN — Medication 1: at 22:49

## 2020-08-07 RX ADMIN — INSULIN GLARGINE 6 UNIT(S): 100 INJECTION, SOLUTION SUBCUTANEOUS at 22:42

## 2020-08-07 RX ADMIN — Medication 4 UNIT(S): at 17:17

## 2020-08-07 RX ADMIN — MAGNESIUM OXIDE 400 MG ORAL TABLET 800 MILLIGRAM(S): 241.3 TABLET ORAL at 17:21

## 2020-08-07 RX ADMIN — MYCOPHENOLATE MOFETIL 750 MILLIGRAM(S): 250 CAPSULE ORAL at 19:53

## 2020-08-07 RX ADMIN — VALGANCICLOVIR 900 MILLIGRAM(S): 450 TABLET, FILM COATED ORAL at 12:20

## 2020-08-07 RX ADMIN — Medication 81 MILLIGRAM(S): at 12:20

## 2020-08-07 RX ADMIN — TAMSULOSIN HYDROCHLORIDE 0.8 MILLIGRAM(S): 0.4 CAPSULE ORAL at 21:01

## 2020-08-07 RX ADMIN — Medication 500000 UNIT(S): at 05:40

## 2020-08-07 RX ADMIN — NYSTATIN CREAM 1 APPLICATION(S): 100000 CREAM TOPICAL at 05:40

## 2020-08-07 RX ADMIN — Medication 1 DROP(S): at 05:40

## 2020-08-07 RX ADMIN — Medication 2: at 12:18

## 2020-08-07 RX ADMIN — Medication 500000 UNIT(S): at 23:04

## 2020-08-07 RX ADMIN — Medication 4 UNIT(S): at 07:55

## 2020-08-07 RX ADMIN — Medication 5 MILLIGRAM(S): at 05:40

## 2020-08-07 RX ADMIN — EVEROLIMUS 4 MILLIGRAM(S): 10 TABLET ORAL at 19:52

## 2020-08-07 NOTE — PROGRESS NOTE BEHAVIORAL HEALTH - NSBHCHARTREVIEWLAB_PSY_A_CORE FT
8.6    3.79  )-----------( 102      ( 22 Jul 2020 06:46 )             26.5   07-22    135  |  103  |  40<H>  ----------------------------<  180<H>  5.4<H>   |  24  |  1.77<H>    Ca    9.1      22 Jul 2020 06:46  Phos  3.7     07-22  Mg     1.8     07-22    TPro  5.5<L>  /  Alb  3.1<L>  /  TBili  0.7  /  DBili  0.3<H>  /  AST  14  /  ALT  18  /  AlkPhos  118  07-22
10.4   4.31  )-----------( 149      ( 15 Jul 2020 07:28 )             32.4     07-15    138  |  99  |  28<H>  ----------------------------<  74  4.3   |  32<H>  |  1.30    Ca    9.3      15 Jul 2020 07:27  Phos  3.4     07-15  Mg     2.0     07-15    TPro  5.7<L>  /  Alb  2.9<L>  /  TBili  1.3<H>  /  DBili  0.5<H>  /  AST  24  /  ALT  70<H>  /  AlkPhos  185<H>  07-15
8.0    3.79  )-----------( 78       ( 23 Jul 2020 05:48 )             23.9     07-23    136  |  105  |  42<H>  ----------------------------<  78  5.6<H>   |  25  |  1.80<H>    Ca    9.2      23 Jul 2020 05:48  Phos  3.7     07-23  Mg     1.8     07-23    TPro  5.3<L>  /  Alb  3.1<L>  /  TBili  0.8  /  DBili  0.3<H>  /  AST  15  /  ALT  16  /  AlkPhos  107  07-23
8.1    2.65  )-----------( 100      ( 07 Aug 2020 05:28 )             25.1     08-07    138  |  103  |  38<H>  ----------------------------<  215<H>  5.0   |  24  |  1.10    Ca    8.8      07 Aug 2020 05:28  Phos  2.9     08-07  Mg     1.7     08-07    TPro  5.6<L>  /  Alb  3.3  /  TBili  0.4  /  DBili  0.2  /  AST  20  /  ALT  30  /  AlkPhos  111  08-07

## 2020-08-07 NOTE — PROGRESS NOTE ADULT - SUBJECTIVE AND OBJECTIVE BOX
TRANSPLANT SURGERY MULTIDISCIPLINARY NOTE    OLTx      Date:  7/2/2020       POD #36    Present:   Patient seen and examined with multidisciplinary team including Transplant Surgeon: Dr. Lucio, Transplant Nephrologist: Dr. Robin Patel, [facility], Gualberto Akers, resident Yudy Dickinson in am rounds with Dr. Lucio. Disciplines not in attendance will be notified of the plan.    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     Re-admitted on 6/11:   ·	worsening HE  ·	UTI/VRE: tx with linezolid 6/15-22, bactrim DS 6/22 - 7/2  ·	MISA/Hyponatremia  ·	UGI bleed (melena) s/p EGD (6/22) c/w hemorrhagic duodenitis/gastritis; Grade 2 EV; requiring multiple transfusions  ·	Known h/o R foot ulcer (MRI neg for OM)  ·	s/p OLTx on 7/2/2020, post op liver doppler with patent vessels  ·	post op course c/b VRE bacteremia (7/10), tx with linezolid 7/10-7/23, dapto 7/23-7/26  ·	post op course c/b delirium likely in setting of CNI toxicity; CT head neg (7/31); off FK 7/19, off cylco 7/27. Everolimus initiated 7/27     Interval events:  - no overnight events; afebrile, vitals stable.   - Awake and alert this AM. Appropriate and conversant  - Stable liver function tests  - Tolerating PO intake, no N/V  - Incontinent with BM  - WBC 2.65, yesterday 2.4.   -was observed off CPAP overnight and was noted to desat several times into low 80s.     Potential Discharge date: pending clinical improvement, early next week.     Education:  Medications    Plan of care:  See Below      MEDICATIONS  (STANDING):  aspirin enteric coated 81 milliGRAM(s) Oral daily  chlorhexidine 2% Cloths 1 Application(s) Topical <User Schedule>  collagenase Ointment 1 Application(s) Topical daily  everolimus (ZORTRESS) 3.5 milliGRAM(s) Oral <User Schedule>  insulin glargine Injectable (LANTUS) 6 Unit(s) SubCutaneous at bedtime  insulin lispro (HumaLOG) corrective regimen sliding scale   SubCutaneous Before meals and at bedtime  insulin lispro Injectable (HumaLOG) 4 Unit(s) SubCutaneous three times a day before meals  magnesium oxide 800 milliGRAM(s) Oral two times a day with meals  mycophenolate mofetil 750 milliGRAM(s) Oral <User Schedule>  nystatin    Suspension 496508 Unit(s) Oral four times a day  nystatin Cream 1 Application(s) Topical two times a day  pantoprazole    Tablet 40 milliGRAM(s) Oral before breakfast  predniSONE   Tablet 5 milliGRAM(s) Oral daily  tamsulosin 0.8 milliGRAM(s) Oral at bedtime  trimethoprim   80 mG/sulfamethoxazole 400 mG 1 Tablet(s) Oral daily  valGANciclovir 900 milliGRAM(s) Oral daily    MEDICATIONS  (PRN):  artificial  tears Solution 1 Drop(s) Both EYES two times a day PRN Dry Eyes  diphenhydrAMINE 50 milliGRAM(s) Oral <User Schedule> PRN Rash and/or Itching      PAST MEDICAL & SURGICAL HISTORY:  GIB (gastrointestinal bleeding)  GERD with esophagitis: Gastritis &amp; Non Bleeding Ulcers  Hepatic encephalopathy  Obesity  Fatty liver disease, nonalcoholic  Renal stones: 25 years ago  Hypertension  Neuropathy  Hypercholesteremia  Diabetes  S/P cholecystectomy      Vital Signs Last 24 Hrs  T(C): 37.1 (07 Aug 2020 09:14), Max: 37.1 (07 Aug 2020 01:00)  T(F): 98.8 (07 Aug 2020 09:14), Max: 98.8 (07 Aug 2020 09:14)  HR: 86 (07 Aug 2020 09:55) (80 - 94)  BP: 111/59 (07 Aug 2020 09:14) (106/56 - 123/62)  BP(mean): --  RR: 20 (07 Aug 2020 09:14) (18 - 20)  SpO2: 85% (07 Aug 2020 10:19) (70% - 100%)    I&O's Summary    06 Aug 2020 07:01  -  07 Aug 2020 07:00  --------------------------------------------------------  IN: 840 mL / OUT: 1700 mL / NET: -860 mL    07 Aug 2020 07:01  -  07 Aug 2020 11:34  --------------------------------------------------------  IN: 520 mL / OUT: 350 mL / NET: 170 mL                              8.1    2.65  )-----------( 100      ( 07 Aug 2020 05:28 )             25.1     08-07    138  |  103  |  38<H>  ----------------------------<  215<H>  5.0   |  24  |  1.10    Ca    8.8      07 Aug 2020 05:28  Phos  2.9     08-07  Mg     1.7     08-07    TPro  5.6<L>  /  Alb  3.3  /  TBili  0.4  /  DBili  0.2  /  AST  20  /  ALT  30  /  AlkPhos  111  08-07    Review of symptoms:   Gen: no weight changes  Skin: no rashes  Head/Eyes/Ears/Mouth: no headache, normal hearing, no changes to vision  Respiratory: no dyspnea, cough  CV: No chest pain, PND, orthopnea  GI: stool incontinence  : no pain on urination  MSK: no joint swelling  Neuro: No dizziness  Heme: no easy bruising or bleeding   Endo: no heat/cold intolerance  Psych: no significant nervousness, anxiety, depression    Physical Exam  Constitutional: NAD/WD/WN  Eyes: Anicteric, PERRLA  ENMT: nc/at. no thrush or ulcerations appreciated  Neck: no JVD, supple  Respiratory: CTA B/L, unlabored breathing   Cardiovascular: RRR  Gastrointestinal: Soft abdomen, non tender to touch at surgical site, ND  Genitourinary: Voiding spontaneously  Extremities: SCD's in place and working bilaterally  Vascular: Palpable dp pulses bilaterally  Neurological: A&O x2-3, following commands   Musculoskeletal: Moving all extremities  Psychiatric: Responsive

## 2020-08-07 NOTE — PROGRESS NOTE BEHAVIORAL HEALTH - NSBHCONSULTFOLLOWAFTERCARE_PSY_A_CORE FT
per primary team  [phone] team  [phone]
per primary team  [phone]
per primary team  [phone]

## 2020-08-07 NOTE — CONSULT NOTE ADULT - SUBJECTIVE AND OBJECTIVE BOX
Pulmonary Consult Note:    CHIEF COMPLAINT:    HPI:    PAST MEDICAL & SURGICAL HISTORY:  GIB (gastrointestinal bleeding)  GERD with esophagitis: Gastritis &amp; Non Bleeding Ulcers  Hepatic encephalopathy  Obesity  Fatty liver disease, nonalcoholic  Renal stones: 25 years ago  Hypertension  Neuropathy  Hypercholesteremia  Diabetes  S/P cholecystectomy      FAMILY HISTORY:  Family history of type 2 diabetes mellitus  Family history of hypertension  Family history of stomach cancer      SOCIAL HISTORY:  Smoking: [ ] Never Smoked [ ] Former Smoker (__ packs x ___ years) [ ] Current Smoker  (__ packs x ___ years)  Substance Use: [ ] Never Used [ ] Used ____  EtOH Use:  Marital Status: [ ] Single [ ]  [ ]  [ ]   Sexual History:   Occupation:  Recent Travel:  Country of Birth:  Advance Directives:    Allergies    codeine (Anaphylaxis)    Intolerances        HOME MEDICATIONS:  Home Medications:  Aldactone 100 mg oral tablet: 1 tab(s) orally once a day (11 Jun 2020 14:13)  Bactrim  mg-160 mg oral tablet: 1 tab(s) orally once a day (11 Jun 2020 14:13)  Basaglar KwikPen 100 units/mL subcutaneous solution: 4 unit(s) subcutaneous once a day (at bedtime) (11 Jun 2020 14:13)  folic acid 1 mg oral tablet: 1 tab(s) orally once a day (11 Jun 2020 14:13)  furosemide 20 mg oral tablet: 1 tab(s) orally once a day (11 Jun 2020 14:13)  HumaLOG KwikPen 100 units/mL injectable solution: 3 unit(s) injectable 3 times a day (before meals) (11 Jun 2020 14:13)  lactulose 10 g/15 mL oral syrup: 30 milliliter(s) orally every 8 hours. Titrate for 3-4 BM daily  (11 Jun 2020 14:13)  midodrine 10 mg oral tablet: 2 tab(s) orally 3 times a day (11 Jun 2020 14:13)  nystatin 100,000 units/g topical cream: Apply topically to affected area 2 times a day (11 Jun 2020 14:13)  pantoprazole 40 mg oral delayed release tablet: 1 tab(s) orally once a day (before a meal) (11 Jun 2020 14:13)  rifAXIMin 550 mg oral tablet: 1 tab(s) orally 2 times a day (11 Jun 2020 14:13)  silver sulfADIAZINE 1% topical cream: Apply topically to affected area every other day to right heel ulcer  (11 Jun 2020 14:13)  tamsulosin 0.4 mg oral capsule: 1 cap(s) orally once a day (at bedtime) (11 Jun 2020 14:13)  traMADol 50 mg oral tablet: 0.5 tab(s) orally 2 times a day (11 Jun 2020 14:13)      REVIEW OF SYSTEMS:  CONSTITUTIONAL: No fever, weight loss, or fatigue  EYES: No eye pain, visual disturbances, or discharge  ENMT:  No difficulty hearing, tinnitus, vertigo; No sinus or throat pain  NECK: No pain or stiffness  BREASTS: No pain, masses, or nipple discharge  RESPIRATORY: No cough, wheezing, chills or hemoptysis; No shortness of breath  CARDIOVASCULAR: No chest pain, palpitations, dizziness, or leg swelling  GASTROINTESTINAL: No abdominal or epigastric pain. No nausea, vomiting, or hematemesis; No diarrhea or constipation. No melena or hematochezia.  GENITOURINARY: No dysuria, frequency, hematuria, or incontinence  NEUROLOGICAL: No headaches, memory loss, loss of strength, numbness, or tremors  SKIN: No itching, burning, rashes, or lesions   LYMPH NODES: No enlarged glands  ENDOCRINE: No heat or cold intolerance; No hair loss  MUSCULOSKELETAL: No joint pain or swelling; No muscle, back, or extremity pain  PSYCHIATRIC: No depression, anxiety, mood swings, or difficulty sleeping  HEME/LYMPH: No easy bruising, or bleeding gums  ALLERY AND IMMUNOLOGIC: No hives or eczema      OBJECTIVE:  ICU Vital Signs Last 24 Hrs  T(C): 37 (07 Aug 2020 12:31), Max: 37.1 (07 Aug 2020 01:00)  T(F): 98.6 (07 Aug 2020 12:31), Max: 98.8 (07 Aug 2020 09:14)  HR: 84 (07 Aug 2020 16:42) (84 - 94)  BP: 138/67 (07 Aug 2020 12:31) (111/59 - 138/67)  BP(mean): --  ABP: --  ABP(mean): --  RR: 18 (07 Aug 2020 12:31) (18 - 20)  SpO2: 100% (07 Aug 2020 16:42) (70% - 100%)        08-06 @ 07:01  -  08-07 @ 07:00  --------------------------------------------------------  IN: 840 mL / OUT: 1700 mL / NET: -860 mL    08-07 @ 07:01  -  08-07 @ 17:00  --------------------------------------------------------  IN: 640 mL / OUT: 570 mL / NET: 70 mL      CAPILLARY BLOOD GLUCOSE      POCT Blood Glucose.: 289 mg/dL (07 Aug 2020 16:53)      PHYSICAL EXAM:  GENERAL: NAD, well-groomed, well-developed  HEAD:  Atraumatic, Normocephalic  EYES: EOMI, PERRLA, conjunctiva and sclera clear  ENMT: No tonsillar erythema, exudates, or enlargement; Moist mucous membranes, Good dentition, No lesions  NECK: Supple, No JVD, Normal thyroid  NERVOUS SYSTEM:  Alert & Oriented X3, Good concentration; Motor Strength 5/5 B/L upper and lower extremities; DTRs 2+ intact and symmetric  CHEST/LUNG: Clear to percussion bilaterally; No rales, rhonchi, wheezing, or rubs  HEART: Regular rate and rhythm; No murmurs, rubs, or gallops  ABDOMEN: Soft, Nontender, Nondistended; Bowel sounds present  EXTREMITIES:  2+ Peripheral Pulses, No clubbing, cyanosis, or edema  LYMPH: No lymphadenopathy noted  SKIN: No rashes or lesions    HOSPITAL MEDICATIONS:  Standing Meds:  aspirin enteric coated 81 milliGRAM(s) Oral daily  chlorhexidine 2% Cloths 1 Application(s) Topical <User Schedule>  collagenase Ointment 1 Application(s) Topical daily  everolimus (ZORTRESS) 3.5 milliGRAM(s) Oral <User Schedule>  insulin glargine Injectable (LANTUS) 6 Unit(s) SubCutaneous at bedtime  insulin lispro (HumaLOG) corrective regimen sliding scale   SubCutaneous Before meals and at bedtime  insulin lispro Injectable (HumaLOG) 4 Unit(s) SubCutaneous three times a day before meals  magnesium oxide 800 milliGRAM(s) Oral two times a day with meals  mycophenolate mofetil 750 milliGRAM(s) Oral <User Schedule>  nystatin    Suspension 980862 Unit(s) Oral four times a day  nystatin Cream 1 Application(s) Topical two times a day  pantoprazole    Tablet 40 milliGRAM(s) Oral before breakfast  predniSONE   Tablet 5 milliGRAM(s) Oral daily  tamsulosin 0.8 milliGRAM(s) Oral at bedtime  trimethoprim   80 mG/sulfamethoxazole 400 mG 1 Tablet(s) Oral daily  valGANciclovir 900 milliGRAM(s) Oral daily      PRN Meds:  artificial  tears Solution 1 Drop(s) Both EYES two times a day PRN  diphenhydrAMINE 50 milliGRAM(s) Oral <User Schedule> PRN      LABS:                        8.1    2.65  )-----------( 100      ( 07 Aug 2020 05:28 )             25.1     Hgb Trend: 8.1<--, 8.6<--, 9.0<--, 8.7<--, 8.3<--  08-07    138  |  103  |  38<H>  ----------------------------<  215<H>  5.0   |  24  |  1.10    Ca    8.8      07 Aug 2020 05:28  Phos  2.9     08-07  Mg     1.7     08-07    TPro  5.6<L>  /  Alb  3.3  /  TBili  0.4  /  DBili  0.2  /  AST  20  /  ALT  30  /  AlkPhos  111  08-07    Creatinine Trend: 1.10<--, 1.10<--, 1.15<--, 1.19<--, 1.19<--, 1.09<--  PT/INR - ( 07 Aug 2020 05:28 )   PT: 12.0 sec;   INR: 1.01 ratio         PTT - ( 07 Aug 2020 05:28 )  PTT:27.6 sec          MICROBIOLOGY:       RADIOLOGY:  [ ] Reviewed and interpreted by me    PULMONARY FUNCTION TESTS:    EKG: Pulmonary Consult Note:    CHIEF COMPLAINT: Snoring    HPI:  63 y/o male with PMH of decompensated JEAN cirrhosis, GAVE, obesity, DM, told in past he has SHENG, now s/p orthotopic liver transplant on 7/2/2020.  Post transplant, while in a monitored setting noted to have witnessed episodes of apneic episodes while sleeping, associated with (desaturations (including when using plain NC). Primary team started him on CPAP 12 cm H2O in SICU post op. Reports non-restorative sleep, frequent nighttime awakenings to urinate, dry mouth in the am. Reports sleep studies that were done in the past that were normal. Pulmonary consulted for assistance with obtaining CPAP machine on discharge.     PAST MEDICAL & SURGICAL HISTORY:  GIB (gastrointestinal bleeding)  GERD with esophagitis: Gastritis &amp; Non Bleeding Ulcers  Hepatic encephalopathy  Obesity  Fatty liver disease, nonalcoholic  Renal stones: 25 years ago  Hypertension  Neuropathy  Hypercholesteremia  Diabetes  S/P cholecystectomy    FAMILY HISTORY:  Family history of type 2 diabetes mellitus  Family history of hypertension  Family history of stomach cancer    SOCIAL HISTORY:  no etoh no cigs, retired   wth children    Allergies  codeine (Anaphylaxis)    HOME MEDICATIONS:  Home Medications:  Aldactone 100 mg oral tablet: 1 tab(s) orally once a day (11 Jun 2020 14:13)  Bactrim  mg-160 mg oral tablet: 1 tab(s) orally once a day (11 Jun 2020 14:13)  Basaglar KwikPen 100 units/mL subcutaneous solution: 4 unit(s) subcutaneous once a day (at bedtime) (11 Jun 2020 14:13)  folic acid 1 mg oral tablet: 1 tab(s) orally once a day (11 Jun 2020 14:13)  furosemide 20 mg oral tablet: 1 tab(s) orally once a day (11 Jun 2020 14:13)  HumaLOG KwikPen 100 units/mL injectable solution: 3 unit(s) injectable 3 times a day (before meals) (11 Jun 2020 14:13)  lactulose 10 g/15 mL oral syrup: 30 milliliter(s) orally every 8 hours. Titrate for 3-4 BM daily  (11 Jun 2020 14:13)  midodrine 10 mg oral tablet: 2 tab(s) orally 3 times a day (11 Jun 2020 14:13)  nystatin 100,000 units/g topical cream: Apply topically to affected area 2 times a day (11 Jun 2020 14:13)  pantoprazole 40 mg oral delayed release tablet: 1 tab(s) orally once a day (before a meal) (11 Jun 2020 14:13)  rifAXIMin 550 mg oral tablet: 1 tab(s) orally 2 times a day (11 Jun 2020 14:13)  silver sulfADIAZINE 1% topical cream: Apply topically to affected area every other day to right heel ulcer  (11 Jun 2020 14:13)  tamsulosin 0.4 mg oral capsule: 1 cap(s) orally once a day (at bedtime) (11 Jun 2020 14:13)  traMADol 50 mg oral tablet: 0.5 tab(s) orally 2 times a day (11 Jun 2020 14:13)    REVIEW OF SYSTEMS:  CONSTITUTIONAL: No fever, weight loss, or fatigue  EYES: No eye pain, visual disturbances, or discharge  ENMT:  No difficulty hearing, tinnitus, vertigo; No sinus or throat pain  RESPIRATORY: No cough, wheezing, chills or hemoptysis; No shortness of breath  CARDIOVASCULAR: No chest pain, palpitations, dizziness, or leg swelling  GASTROINTESTINAL: No abdominal or epigastric pain. No nausea, vomiting, or hematemesis; No diarrhea or constipation. No melena or hematochezia.  GENITOURINARY: No dysuria, frequency, hematuria, or incontinence  NEUROLOGICAL: No headaches, memory loss, loss of strength, numbness, or tremors  SKIN: No itching, burning, rashes, or lesions   LYMPH NODES: No enlarged glands  ENDOCRINE: No heat or cold intolerance; No hair loss  MUSCULOSKELETAL: No joint pain or swelling; No muscle, back, or extremity pain    OBJECTIVE:  ICU Vital Signs Last 24 Hrs  T(C): 37 (07 Aug 2020 12:31), Max: 37.1 (07 Aug 2020 01:00)  T(F): 98.6 (07 Aug 2020 12:31), Max: 98.8 (07 Aug 2020 09:14)  HR: 84 (07 Aug 2020 16:42) (84 - 94)  BP: 138/67 (07 Aug 2020 12:31) (111/59 - 138/67)  RR: 18 (07 Aug 2020 12:31) (18 - 20)  SpO2: 100% (07 Aug 2020 16:42) (70% - 100%)    08-06 @ 07:01  -  08-07 @ 07:00  --------------------------------------------------------  IN: 840 mL / OUT: 1700 mL / NET: -860 mL    08-07 @ 07:01  - 08-07 @ 17:00  --------------------------------------------------------  IN: 640 mL / OUT: 570 mL / NET: 70 mL      CAPILLARY BLOOD GLUCOSE  POCT Blood Glucose.: 289 mg/dL (07 Aug 2020 16:53)      PHYSICAL EXAM:  GENERAL: NAD, well-groomed, well-developed  HEAD:  Atraumatic, Normocephalic  EYES: EOMI, conjunctiva and sclera clear  ENMT: No tonsillar erythema, exudates, or enlargement; Moist mucous membranes,   NECK: Supple, No JVD, Normal thyroid  NERVOUS SYSTEM:  Alert & Oriented X3, Good concentration; Moves all extremities  CHEST/LUNG: Clear to percussion bilaterally; No rales, rhonchi, wheezing, or rubs  HEART: Regular rate and rhythm; No murmurs, rubs, or gallops  ABDOMEN: Soft, Nontender, Nondistended; Bowel sounds present, Surgical wounds in RUG, appear to be healing well  EXTREMITIES:  2+ Peripheral Pulses, No clubbing, cyanosis, or edema  LYMPH: No lymphadenopathy noted  SKIN: No rashes or lesions    HOSPITAL MEDICATIONS:  Standing Meds:  aspirin enteric coated 81 milliGRAM(s) Oral daily  chlorhexidine 2% Cloths 1 Application(s) Topical <User Schedule>  collagenase Ointment 1 Application(s) Topical daily  everolimus (ZORTRESS) 3.5 milliGRAM(s) Oral <User Schedule>  insulin glargine Injectable (LANTUS) 6 Unit(s) SubCutaneous at bedtime  insulin lispro (HumaLOG) corrective regimen sliding scale   SubCutaneous Before meals and at bedtime  insulin lispro Injectable (HumaLOG) 4 Unit(s) SubCutaneous three times a day before meals  magnesium oxide 800 milliGRAM(s) Oral two times a day with meals  mycophenolate mofetil 750 milliGRAM(s) Oral <User Schedule>  nystatin    Suspension 744325 Unit(s) Oral four times a day  nystatin Cream 1 Application(s) Topical two times a day  pantoprazole    Tablet 40 milliGRAM(s) Oral before breakfast  predniSONE   Tablet 5 milliGRAM(s) Oral daily  tamsulosin 0.8 milliGRAM(s) Oral at bedtime  trimethoprim   80 mG/sulfamethoxazole 400 mG 1 Tablet(s) Oral daily  valGANciclovir 900 milliGRAM(s) Oral daily      PRN Meds:  artificial  tears Solution 1 Drop(s) Both EYES two times a day PRN  diphenhydrAMINE 50 milliGRAM(s) Oral <User Schedule> PRN      LABS:                        8.1    2.65  )-----------( 100      ( 07 Aug 2020 05:28 )             25.1     Hgb Trend: 8.1<--, 8.6<--, 9.0<--, 8.7<--, 8.3<--  08-07    138  |  103  |  38<H>  ----------------------------<  215<H>  5.0   |  24  |  1.10    Ca    8.8      07 Aug 2020 05:28  Phos  2.9     08-07  Mg     1.7     08-07    TPro  5.6<L>  /  Alb  3.3  /  TBili  0.4  /  DBili  0.2  /  AST  20  /  ALT  30  /  AlkPhos  111  08-07    Creatinine Trend: 1.10<--, 1.10<--, 1.15<--, 1.19<--, 1.19<--, 1.09<--  PT/INR - ( 07 Aug 2020 05:28 )   PT: 12.0 sec;   INR: 1.01 ratio    PTT - ( 07 Aug 2020 05:28 )  PTT:27.6 sec      RADIOLOGY:  [ ] Reviewed and interpreted by me    PULMONARY FUNCTION TESTS:    EKG:

## 2020-08-07 NOTE — CONSULT NOTE ADULT - ASSESSMENT
65 y/o male with PMH of decompensated JEAN cirrhosis, GAVE, obesity, DM, told in past he has SHENG, now s/p orthotopic liver transplant on 7/2/2020.  Post transplant, while in a monitored setting noted to have witnessed episodes of apneic episodes while sleeping, associated with (desaturations (including when using plain NC). Primary team started him on CPAP 12 cm H2O in SICU post op. Reports non-restorative sleep, frequent nighttime awakenings to urinate, dry mouth in the am. Reports sleep studies that were done in the past that were normal. Pulmonary consulted for assistance with obtaining CPAP machine on discharge.     -Pan for an HSAT (Home sleep apnea test) in the hospital on Monday  -will coordinate with the sleep lab  -Please hold his CPAP Sunday night in preparation for the study Monday night  -Once study is complete, will interpret and assess if SHENG is present and if it is then will determine disease burden  -Will continue to follow    Betty Garrison MD  Pulmonary & Critical Care Medicine Fellow | PGY-6  Pager: [phone]  Spectra: [phone] ([facility]) and [phone] ([facility])

## 2020-08-07 NOTE — PROGRESS NOTE ADULT - ASSESSMENT
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 c/b VRE bactermia and delirium    [] POD 36 s/p OLT   - Good graft function, LFTs stable   - Immuno:  Everolimus 3.5mg bid, MMF 750mg bid, Pred 5     -->Tacro DC'd 7/19. Cyclosporine DC'd 7/27.  Changed to Everolimus on 7/27 due to persistent delirium/AMS, now with improvement     -->Daily Everolimus leve  - PPX: Valcyte/Nystatin/Bactrim  - Mental status improving slowly.  CT Head negative  - Diet: Regular, tolerating, PO intake improving  - Continue ASA 81mg daily  - h/o UGI bleed: Protonix 40mg daily  - DVT PPx: SCDs at all times  - Sacral decub: seen by wound care, recs appreciated. Healing well.   - Pain control PRN  - keep Mag >2  - Observed off CPAP overnight, several desaturation episodes into 80s. Cont nocturnal CPAP. Will determine if can go home on CPAP on current settings and can get sleep study w/ pulm as outpatient.     [] VRE bacteremia (7/10)  - tx with linezolid 7/10-7/23,  Daptomycin 7/23-7/26   - BCx from 7/20 with NG, UCx (7/20) with NG     [] DM  - Appreciate Endocrinology recommendations  - Continue lantus 5u qhs and ISS  - Resume premeal insulin 2 U Humalog  - Fingersticks ac and qhs    [] Heel ulcer  - Healing well. Keep open to air.   - Appreciate podiatry recommendations    [] Dispo:    - continue 1:1 overnight. DC 1:1 during day.   - expect d/c home with home PT, potentially early next week  - daily PT/OT (team aware)  - discharge planning in progress w/ Liver transplant

## 2020-08-07 NOTE — PROGRESS NOTE BEHAVIORAL HEALTH - THOUGHT PROCESS
Impaired reasoning/Disorganized
Illogical/Disorganized/Impaired reasoning
Impaired reasoning/Linear
Linear

## 2020-08-07 NOTE — CONSULT NOTE ADULT - PROVIDER SPECIALTY LIST ADULT
Hepatology
Rehab Medicine
Endocrinology
Infectious Disease
Pulmonology
Gastroenterology
MICU
Podiatry
SICU
Transplant Surgery
Wound Care
Nephrology

## 2020-08-07 NOTE — CONSULT NOTE ADULT - ATTENDING COMMENTS
65 y/o male decomp JEAN cirrhosis, GAVE, TANG, HE, told in past he has SHENG, now s/p orthotopic liver transplant on 7/2.  Post transplant found to have nightly episodes of witnessed apneic events associated with obstructions and desaturations (including when using plain NC).  Started on CPAP 12 cm H2O in SICU post op.  Clinically, he states he has frequent nocturnal awakenings as well as dry mouth in the AM.  He noted non-restorative sleep and was evaluated multiple times by a sleep study but was not found to have SHENG.  On physical exam, the patient has a low-lying soft palate which can increase the risk of SHENG.    Plan:  -Will plan for an HSAT in the hospital on Monday--will coordinate with the sleep lab  -Please hold his CPAP Julian night in preparation for the study Monday night  -Once study is complete, will interpret and assess if SHENG is present and if it is then will determine disease burden  -Will continue to follow    Nilesh Celeste DO

## 2020-08-07 NOTE — PROGRESS NOTE ADULT - SUBJECTIVE AND OBJECTIVE BOX
Follow Up:  s/p olt, encephalopathy    Interval History: more alert today. some hypoxia overnight - was off his CPAP    REVIEW OF SYSTEMS  [  ] ROS unobtainable because:    [ x ] All other systems negative except as noted below    Constitutional:  [ ] fever [ ] chills  [ ] weight loss  [ ] weakness  Skin:  [ ] rash [ ] phlebitis	  Eyes: [ ] icterus [ ] pain  [ ] discharge	  ENMT: [ ] sore throat  [ ] thrush [ ] ulcers [ ] exudates  Respiratory: [ ] dyspnea [ ] hemoptysis [ ] cough [ ] sputum	  Cardiovascular:  [ ] chest pain [ ] palpitations [ ] edema	  Gastrointestinal:  [ ] nausea [ ] vomiting [ ] diarrhea [ ] constipation [ ] pain	  Genitourinary:  [ ] dysuria [ ] frequency [ ] hematuria [ ] discharge [ ] flank pain  [ ] incontinence  Musculoskeletal:  [ ] myalgias [ ] arthralgias [ ] arthritis  [ ] back pain  Neurological:  [ ] headache [ ] seizures  [ ] confusion/altered mental status    Allergies  codeine (Anaphylaxis)        ANTIMICROBIALS:  nystatin    Suspension 413016 four times a day  trimethoprim   80 mG/sulfamethoxazole 400 mG 1 daily  valGANciclovir 900 daily      OTHER MEDS:  MEDICATIONS  (STANDING):  aspirin enteric coated 81 daily  diphenhydrAMINE 50 <User Schedule> PRN  everolimus (ZORTRESS) 3.5 <User Schedule>  insulin glargine Injectable (LANTUS) 6 at bedtime  insulin lispro (HumaLOG) corrective regimen sliding scale  Before meals and at bedtime  insulin lispro Injectable (HumaLOG) 4 three times a day before meals  mycophenolate mofetil 750 <User Schedule>  pantoprazole    Tablet 40 before breakfast  predniSONE   Tablet 5 daily  tamsulosin 0.8 at bedtime      Vital Signs Last 24 Hrs  T(C): 37 (07 Aug 2020 12:31), Max: 37.1 (07 Aug 2020 01:00)  T(F): 98.6 (07 Aug 2020 12:31), Max: 98.8 (07 Aug 2020 09:14)  HR: 86 (07 Aug 2020 17:00) (84 - 94)  BP: 145/67 (07 Aug 2020 17:00) (111/59 - 145/67)  BP(mean): 95 (07 Aug 2020 17:00) (95 - 95)  RR: 18 (07 Aug 2020 17:00) (18 - 20)  SpO2: 99% (07 Aug 2020 17:00) (70% - 100%)    PHYSICAL EXAMINATION:  General: Alert and Awake, NAD  HEENT: PERRL, EOMI  Neck: Supple  Cardiac: RRR, No M/R/G  Resp: CTAB, No Wh/Rh/Ra  Abdomen: Surgical site from OLT is clean, without erythema and without drainage. NBS, NT/ND, No HSM, No rigidity or guarding  MSK: No LE edema. No Calf tenderness  : No bob  Skin: No rashes or lesions. Skin is warm and dry to the touch.   Neuro: Alert and Awake. CN 2-12 Grossly intact. Moves all four extremities spontaneously.  Psych: Calm, Pleasant, Cooperative                          8.1    2.65  )-----------( 100      ( 07 Aug 2020 05:28 )             25.1       08-07    138  |  103  |  38<H>  ----------------------------<  215<H>  5.0   |  24  |  1.10    Ca    8.8      07 Aug 2020 05:28  Phos  2.9     08-07  Mg     1.7     08-07    TPro  5.6<L>  /  Alb  3.3  /  TBili  0.4  /  DBili  0.2  /  AST  20  /  ALT  30  /  AlkPhos  111  08-07          MICROBIOLOGY:  v  .Blood Blood  07-20-20   No Growth Final  --  --      .Urine Clean Catch (Midstream)  07-20-20   No growth  --  --      .Body Fluid Abdominal Fluid  07-14-20   No growth at 5 days  --    polymorphonuclear leukocytes seen  No organisms seen  by cytocentrifuge      .Blood Blood-Peripheral  07-12-20   No Growth Final  --  --      .Urine Catheterized  07-11-20   No growth  --  --      .Blood Blood-Peripheral  07-10-20   Growth in aerobic bottle: Enterococcus faecium (vancomycin resistant)  "Due to technical problems, Proteus sp. will Not be reported as part of  the BCID panel until further notice"  ***Blood Panel PCR results on this specimen are available  approximately 3 hours after the Gram stain result.***  Gram stain, PCR, and/or culture results may not always  correspond due to difference in methodologies.  ************************************************************  This PCR assay was performed using [employer].  The following targets are tested for: Enterococcus,  vancomycin resistant enterococci, Listeria monocytogenes,  coagulase negative staphylococci, S. aureus,  methicillin resistant S. aureus, Streptococcus agalactiae  (Group B), S. pneumoniae, S. pyogenes (Group A),  Acinetobacter baumannii, Enterobacter cloacae, E. coli,  Klebsiella oxytoca, K. pneumoniae, Proteus sp.,  Serratia marcescens, Haemophilus influenzae,  Neisseria meningitidis, Pseudomonas aeruginosa, Candida  albicans, C. glabrata, C krusei, C parapsilosis,  C. tropicalis and the KPC resistance gene.  --  Blood Culture PCR  Enterococcus faecium (vancomycin resistant)        CMV IgG Antibody: <0.20 U/mL (12-04-19 @ 08:33)  Toxoplasma IgG Screen: <3.0 IU/mL (12-04-19 @ 08:33)    RADIOLOGY:    <The imaging below has been reviewed and visualized by me independently. Findings as detailed in report below>    EXAM:  CT BRAIN                        PROCEDURE DATE:  07/31/2020    Slight progression to atrophic changes compared with 6/11/2020 which may be the result of treatment-related changes. No other significant pathology

## 2020-08-07 NOTE — PROGRESS NOTE BEHAVIORAL HEALTH - CASE SUMMARY
Pt is a 65y/o  male, retired from construction management, with 4 kids (18, 24, 26, 34), lives with his spouse and 3 adult kids, with no PPHx, no h/o inpt psychiatric hospitalizations, no SA/SIB, no previous h/o aggression, with PMHx small esophageal varices (12/2019), portal hypertensive gastropathy, ascites, hx SBP, hx hepatic encephalopathy, GAVE syndrome s/p APC, hx duodenal ulcer (5/2019), HTN, HLD, DM, HFpEF (EF 70%), recent ESBL E coli prostate abscess.  patient seen and evaluated, awake and alert oriented x 3, denies feeling depressed, denies SI/HI, per team patient has not been agitated, has been appropriate but at times waxes and waning however improved from previous assessment.  At this time, patient's presentation consistent with delirium 2/2 GMC.  Does not warrant inpt psychiatric admission.
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
## 2020-08-07 NOTE — CONSULT NOTE ADULT - CONSULT REQUESTED DATE/TIME
06-Jul-2020 13:09
07-Aug-2020 17:00
07-Jul-2020 17:04
11-Jun-2020 15:22
12-Jun-2020 12:17
12-Jun-2020 15:43
13-Jun-2020 13:13
14-Jun-2020 06:00
18-Jun-2020 11:18
21-Jun-2020 13:04
21-Jun-2020 22:52
17-Jul-2020 11:19

## 2020-08-07 NOTE — PROGRESS NOTE BEHAVIORAL HEALTH - NSBHCHARTREVIEWVS_PSY_A_CORE FT
Vital Signs Last 24 Hrs  T(C): 36.4 (18 Jul 2020 13:00), Max: 36.7 (18 Jul 2020 05:00)  T(F): 97.6 (18 Jul 2020 13:00), Max: 98 (18 Jul 2020 05:00)  HR: 88 (18 Jul 2020 15:08) (78 - 91)  BP: 114/71 (18 Jul 2020 13:00) (114/71 - 136/63)  BP(mean): 90 (18 Jul 2020 05:00) (90 - 90)  RR: 18 (18 Jul 2020 13:00) (18 - 19)  SpO2: 98% (18 Jul 2020 15:08) (97% - 100%)
Vital Signs Last 24 Hrs  T(C): 36.9 (23 Jul 2020 13:00), Max: 36.9 (23 Jul 2020 13:00)  T(F): 98.4 (23 Jul 2020 13:00), Max: 98.4 (23 Jul 2020 13:00)  HR: 79 (23 Jul 2020 13:00) (79 - 91)  BP: 133/66 (23 Jul 2020 13:00) (115/58 - 140/65)  BP(mean): 94 (22 Jul 2020 17:00) (94 - 94)  RR: 18 (23 Jul 2020 13:00) (18 - 18)  SpO2: 96% (23 Jul 2020 13:00) (91% - 97%)
Vital Signs Last 24 Hrs  T(C): 37 (07 Aug 2020 12:31), Max: 37.1 (07 Aug 2020 01:00)  T(F): 98.6 (07 Aug 2020 12:31), Max: 98.8 (07 Aug 2020 09:14)  HR: 88 (07 Aug 2020 12:31) (80 - 94)  BP: 138/67 (07 Aug 2020 12:31) (109/66 - 138/67)  BP(mean): --  RR: 18 (07 Aug 2020 12:31) (18 - 20)  SpO2: 99% (07 Aug 2020 12:31) (70% - 100%)
T(C): 36.6 (07-22-20 @ 10:28), Max: 36.9 (07-21-20 @ 18:00)  HR: 81 (07-22-20 @ 10:28) (79 - 98)  BP: 130/66 (07-22-20 @ 10:28) (100/54 - 132/63)  RR: 20 (07-22-20 @ 10:28) (17 - 20)  SpO2: 100% (07-22-20 @ 10:28) (95% - 100%)  Wt(kg): --

## 2020-08-07 NOTE — CHART NOTE - NSCHARTNOTEFT_GEN_A_CORE
pt with nocturnal hypoxia 2/2 known SHENG requiring CPAP QHS during admission with relief.  Will require home CPAP to manage SHENG upon d/c.    Saturation log overnight on RA without CPAP/O2 below.  All apneic episodes were witnessed directly by nursing staff and myself.    22:50:  84%  22:54:  83%  23:16:  85%  0122:   84%  0345:   85%  0401:   87%  0402:   89%  0414:   83% pt with nocturnal hypoxia 2/2 known SHENG requiring CPAP QHS during admission with relief.  Will require home CPAP and formal sleepy study to manage SHENG upon d/c.    Saturation log overnight on RA without CPAP/O2 below.  All apneic episodes were witnessed directly by nursing staff and myself.    22:50:  84%  22:54:  83%  23:16:  85%  0122:   84%  0345:   85%  0401:   87%  0402:   89%  0414:   83% pt with nocturnal hypoxia 2/2 known SHENG requiring CPAP QHS during admission with relief.  Will require home CPAP and formal sleep study to manage SHENG upon d/c.    Saturation log overnight on RA without CPAP/O2 below.  All apneic episodes were witnessed directly by nursing staff and myself.    22:50:  84%  22:54:  83%  23:16:  85%  0122:   84%  0345:   85%  0401:   87%  0402:   89%  0414:   83% pt with nocturnal hypoxia 2/2 known SHENG requiring CPAP QHS during admission with relief.  Will require home CPAP and formal sleep study to manage SHENG upon d/c.    Saturation log overnight on RA without CPAP/O2 below.  All apneic episodes were witnessed directly by nursing staff and myself.    2250:   84%  2254:   83%  2316:   85%  0122:   84%  0345:   85%  0401:   87%  0402:   89%  0414:   83% pt with nocturnal hypoxia 2/2 known SHENG requiring CPAP QHS during admission with relief.  Will require home CPAP and formal sleep study to manage SEHNG upon d/c.    Saturation log overnight on RA without CPAP/O2 below.  All apneic episodes were witnessed directly by nursing staff and myself.    2250:   84%  2254:   83%  2316:   85%  0122:   84%  0345:   85%  0401:   87%  0402:   89%  0414:   83%  0656:   82%

## 2020-08-07 NOTE — PROGRESS NOTE BEHAVIORAL HEALTH - NSBHCONSULTMEDAGITATION_PSY_A_CORE FT
Seroquel 25mg po prn daily for agitation (if qtc <500ms on ekg)
Seroquel 25mg po prn daily for agitation (if qtc <500ms on ekg)
)
haldol 1 mg po/im/iv BID PRN for acute agitation, f/u QTc < 500

## 2020-08-07 NOTE — PROGRESS NOTE BEHAVIORAL HEALTH - NSBHCHARTREVIEWINVESTIGATE_PSY_A_CORE FT
Detail Level: Zone
< from: 12 Lead ECG (07.27.20 @ 04:49) >      Ventricular Rate 83 BPM    Atrial Rate 83 BPM    P-R Interval 190 ms    QRS Duration 92 ms    Q-T Interval 400 ms    QTC Calculation(Bezet) 470 ms    P Axis 38 degrees    R Axis -16 degrees    T Axis 26 degrees    Diagnosis Line NORMAL SINUS RHYTHM  NORMAL ECG  WHEN COMPARED WITH ECG OF 26-JUL-2020  NO SIGNIFICANT CHANGE WAS FOUND  Confirmed by ALEXANDRIA Justice Zlata ([id number]) on 7/27/2020 12:20:00 PM    < end of copied text >
Ventricular Rate 91 BPM    Atrial Rate 91 BPM    P-R Interval 194 ms    QRS Duration 90 ms    Q-T Interval 378 ms    QTC Calculation(Bezet) 464 ms    P Axis 25 degrees    R Axis -6 degrees    T Axis 33 degrees    Diagnosis Line SINUS RHYTHM WITH OCCASIONAL ventricular-paced complexes AND PREMATURE SUPRAVENTRICULAR COMPLEXES  CANNOT RULE OUT ANTERIOR INFARCT , AGE UNDETERMINED  ABNORMAL ECG  WHEN COMPARED WITH ECG OF 05-JUL-2020 19:57,  PACED BEATS ARE NEW  Confirmed by MD Sven, Pedro Luis ([id number]) on 7/10/2020 8:48:32 AM
< from: 12 Lead ECG (07.08.20 @ 12:15) >      Ventricular Rate 91 BPM    Atrial Rate 91 BPM    P-R Interval 194 ms    QRS Duration 90 ms    Q-T Interval 378 ms    QTC Calculation(Bezet) 464 ms    P Axis 25 degrees    R Axis -6 degrees    T Axis 33 degrees    Diagnosis Line SINUS RHYTHM WITH OCCASIONAL ventricular-paced complexes AND PREMATURE SUPRAVENTRICULAR COMPLEXES  CANNOT RULE OUT ANTERIOR INFARCT , AGE UNDETERMINED  ABNORMAL ECG  WHEN COMPARED WITH ECG OF 05-JUL-2020 19:57,  PACED BEATS ARE NEW  Confirmed by MD Sven, Pedro Luis ([id number]) on 7/10/2020 8:48:32 AM    < end of copied text >

## 2020-08-07 NOTE — PROGRESS NOTE ADULT - ATTENDING COMMENTS
agree with above  good liver function   makng slow progress with physical therapy  need sleep apnea study  immunosuppression everolimus and steroids and mmf

## 2020-08-07 NOTE — CONSULT NOTE ADULT - REASON FOR ADMISSION
Liver failure, hepatic encephalopathy

## 2020-08-07 NOTE — PROGRESS NOTE ADULT - SUBJECTIVE AND OBJECTIVE BOX
GASTROENTEROLOGY    Patient seen and examined  doing well        MEDICATIONS  (STANDING):  aspirin enteric coated 81 milliGRAM(s) Oral daily  chlorhexidine 2% Cloths 1 Application(s) Topical <User Schedule>  collagenase Ointment 1 Application(s) Topical daily  diphenhydrAMINE 50 milliGRAM(s) Oral at bedtime  everolimus (ZORTRESS) 3.5 milliGRAM(s) Oral <User Schedule>  insulin lispro (HumaLOG) corrective regimen sliding scale   SubCutaneous Before meals and at bedtime  magnesium oxide 800 milliGRAM(s) Oral two times a day with meals  mycophenolate mofetil 750 milliGRAM(s) Oral <User Schedule>  nystatin    Suspension 562062 Unit(s) Oral four times a day  nystatin Cream 1 Application(s) Topical two times a day  pantoprazole    Tablet 40 milliGRAM(s) Oral before breakfast  predniSONE   Tablet 5 milliGRAM(s) Oral daily  tamsulosin 0.8 milliGRAM(s) Oral at bedtime  trimethoprim   80 mG/sulfamethoxazole 400 mG 1 Tablet(s) Oral daily  valGANciclovir 50 mG/mL Oral Solution 900 milliGRAM(s) Oral daily        T(F): 98.1 (08-02-20 @ 05:13), Max: 98.3 (08-01-20 @ 09:00)  HR: 75 (08-02-20 @ 05:13) (75 - 85)  BP: 133/64 (08-02-20 @ 05:13) (103/55 - 136/67)  RR: 18 (08-02-20 @ 05:13) (18 - 18)  SpO2: 98% (08-02-20 @ 05:13) (96% - 100%)  Wt(kg): --    PHYSICAL EXAM  GENERAL:   NAD  HEENT:  NC/AT, no JVD, sclera-anicteric  ABDOMEN:  Soft, non-distended, (+) bowel sounds, incision c/d/i  EXTREMITIES:  + peripheral edema  NEURO:  tired but able to answer questions appropriately   SKIN:  No rash/warm/dry      LABS:                        8.6<L>  3.18<L> )-----------( 114<L>    ( 02 Aug 2020 06:17 )             25.8<L>  02 Aug 2020 06:17    08-02    139  |  106  |  31<H>  ----------------------------<  162<H>  4.7   |  21<L>  |  1.09    Ca    9.1      02 Aug 2020 06:17  Phos  3.2     08-02  Mg     1.7     08-02    TPro  5.3<L>  /  Alb  3.2<L>  /  TBili  0.6  /  DBili  0.2  /  AST  15  /  ALT  23  /  AlkPhos  102  08-02    LIVER FUNCTIONS - ( 02 Aug 2020 06:17 )  Alb: 3.2 g/dL / Pro: 5.3 g/dL / ALK PHOS: 102 U/L / ALT: 23 U/L / AST: 15 U/L / GGT: x           PT/INR - ( 02 Aug 2020 06:17 )   PT: 12.4 sec;   INR: 1.04 ratio         PTT - ( 02 Aug 2020 06:17 )  PTT:29.2 sec  I&O's Detail    01 Aug 2020 07:01  -  02 Aug 2020 07:00  --------------------------------------------------------  IN:    Oral Fluid: 1500 mL    Solution: 100 mL  Total IN: 1600 mL    OUT:    Voided: 800 mL  Total OUT: 800 mL    Total NET: 800 mL

## 2020-08-07 NOTE — CONSULT NOTE ADULT - CONSULT REQUESTED BY NAME
Cary Rivera
Dr Rivera
Dr. Ackerman
Dr. Barboza
Dr. Rosario
Mo
Sheryl COY
dr scott
primary team
team
team
Dr. Chris

## 2020-08-07 NOTE — CONSULT NOTE ADULT - CONSULT REASON
AMS-resolved
GIB
LYN cummins DTI
MISA
Sleep apnea evaluation
Uncontrolled Diabetes/Steroid-Induced Hyperglycemia
altered mental status
anemia
liver failure
bilateral buttocks wound
Decompensated cirrhosis
debility 2/2 liver transplant

## 2020-08-07 NOTE — PROGRESS NOTE BEHAVIORAL HEALTH - SUMMARY
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 Hospital course c/b MISA, VRE UTI, acute blood loss anemia, received blood transfusion, EGD done 6/22 with Grade II esophageal varices, acute gastritis with hemorrhage and acute duodenitis with hemorrhage. Patient had worsening mental status, concerning decompensating liver cirrhosis, hepatic encephalopathy patient then transferred to SICU for further care. He is now s/p OLT complicated by delirium, bacteremia, hyper/hypoglycemia.  Psychiatry consulted to assess for delirium, agitation.  Patient seen and evaluated, awake and alert oriented x 2.  Able to report some recent events in the hospital such as having a liver transplant.  Denies SI/HI, denies feeling depressed.  Reports feeling frustrated that staff in the hospital have been telling him he is confused however patient disagrees with this.  Reports at times feeling as if people are standing behind him.  Currently denies AVH.  Per wife, patient for the past week has been waxing and waning, reports yesterday patient was having visual hallucinations, seeing tacos on the floor.  She reports patient is also more irritable.  Denies patient has any cognitive deficits or dementia at baseline.  At this time, patient's presentation consistent with delirium 2/2 GMC.  Pt continues on Seroquel 25mg po q1800 as well as melatonin 3mg po qhs, however is having agitation and hallucinations, seeing people in his room.  Recommended adding  Zyprexa 2.5mg po/IM prn agitation or psychosis and pt will continue to be on constant observation.  His team plans to change his antirejection medications to minimize neurotoxicity.
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 Hospital course c/b MISA, VRE UTI, acute blood loss anemia, received blood transfusion, EGD done 6/22 with Grade II esophageal varices, acute gastritis with hemorrhage and acute duodenitis with hemorrhage. Patient had worsening mental status, concerning decompensating liver cirrhosis, hepatic encephalopathy patient then transferred to SICU for further care. He is now s/p OLT complicated by delirium, bacteremia, hyper/hypoglycemia.  Psychiatry consulted to assess for delirium, agitation.  Patient seen and evaluated, awake and alert oriented x 2.  Able to report some recent events in the hospital such as having a liver transplant.  Denies SI/HI, denies feeling depressed.  Reports feeling frustrated that staff in the hospital have been telling him he is confused however patient disagrees with this.  Reports at times feeling as if people are standing behind him.  Currently denies AVH.  Per wife, patient for the past week has been waxing and waning, reports yesterday patient was having visual hallucinations, seeing tacos on the floor.  She reports patient is also more irritable.  Denies patient has any cognitive deficits or dementia at baseline.  At this time, patient's presentation consistent with delirium 2/2 GMC.  Pt continues on Seroquel 25mg po q1800 as well as melatonin 3mg po qhs, however is having agitation and hallucinations, seeing people in his room.
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 Hospital course c/b MISA, VRE UTI, acute blood loss anemia, received blood transfusion, EGD done 6/22 with Grade II esophageal varices, acute gastritis with hemorrhage and acute duodenitis with hemorrhage. Patient had worsening mental status, concerning decompensating liver cirrhosis, hepatic encephalopathy patient then transferred to SICU for further care. He is now s/p OLT complicated by delirium, bacteremia, hyper/hypoglycemia.  Psychiatry consulted to assess for delirium, agitation.  Patient seen and evaluated, awake and alert oriented x 2.  Able to report some recent events in the hospital such as having a liver transplant.  Denies SI/HI, denies feeling depressed.  Reports feeling frustrated that staff in the hospital have been telling him he is confused however patient disagrees with this.  Reports at times feeling as if people are standing behind him.  Currently denies AVH.  Per wife, patient for the past week has been waxing and waning, reports yesterday patient was having visual hallucinations, seeing tacos on the floor.  She reports patient is also more irritable.  Denies patient has any cognitive deficits or dementia at baseline.  8/7- patient seen and evaluated, awake and alert oriented x 3, denies feeling depressed, denies SI/HI, per team patient has not been agitated, has been appropriate but at times waxes and waning however improved from previous assessment.  At this time, patient's presentation consistent with delirium 2/2 GMC.  Does not warrant inpt psychiatric admission.
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 Hospital course c/b MISA, VRE UTI, acute blood loss anemia, received blood transfusion, EGD done 6/22 with Grade II esophageal varices, acute gastritis with hemorrhage and acute duodenitis with hemorrhage. Patient had worsening mental status, concerning decompensating liver cirrhosis, hepatic encephalopathy patient then transferred to SICU for further care. He is now s/p OLT complicated by delirium, bacteremia, hyper/hypoglycemia.  Psychiatry consulted to assess for delirium, agitation.  Patient seen and evaluated, awake and alert oriented x 2.  Able to report some recent events in the hospital such as having a liver transplant.  Denies SI/HI, denies feeling depressed.  Reports feeling frustrated that staff in the hospital have been telling him he is confused however patient disagrees with this.  Reports at times feeling as if people are standing behind him.  Currently denies AVH.  Per wife, patient for the past week has been waxing and waning, reports yesterday patient was having visual hallucinations, seeing tacos on the floor.  She reports patient is also more irritable.  Denies patient has any cognitive deficits or dementia at baseline.  At this time, patient's presentation consistent with delirium 2/2 GMC.  Pt continues on Seroquel 25mg po q1800 as well as melatonin 3mg po qhs, however is having agitation and hallucinations, seeing people in his room.

## 2020-08-07 NOTE — PROGRESS NOTE ADULT - SUBJECTIVE AND OBJECTIVE BOX
Chief Complaint:  Patient is a 64y old  Male who presents with a chief complaint of Liver failure, hepatic encephalopathy (06 Aug 2020 15:10)    Reason for consult: s/p OLT    Interval Events: Pt feels well, walked yesterday, desats overnight w/o CPAP, otherwise feeling ready to return home.     Hospital Medications:  artificial  tears Solution 1 Drop(s) Both EYES two times a day PRN  aspirin enteric coated 81 milliGRAM(s) Oral daily  chlorhexidine 2% Cloths 1 Application(s) Topical <User Schedule>  collagenase Ointment 1 Application(s) Topical daily  diphenhydrAMINE 50 milliGRAM(s) Oral <User Schedule> PRN  everolimus (ZORTRESS) 3.5 milliGRAM(s) Oral <User Schedule>  insulin glargine Injectable (LANTUS) 6 Unit(s) SubCutaneous at bedtime  insulin lispro (HumaLOG) corrective regimen sliding scale   SubCutaneous Before meals and at bedtime  insulin lispro Injectable (HumaLOG) 4 Unit(s) SubCutaneous three times a day before meals  magnesium oxide 800 milliGRAM(s) Oral two times a day with meals  mycophenolate mofetil 750 milliGRAM(s) Oral <User Schedule>  nystatin    Suspension 831315 Unit(s) Oral four times a day  nystatin Cream 1 Application(s) Topical two times a day  pantoprazole    Tablet 40 milliGRAM(s) Oral before breakfast  predniSONE   Tablet 5 milliGRAM(s) Oral daily  tamsulosin 0.8 milliGRAM(s) Oral at bedtime  trimethoprim   80 mG/sulfamethoxazole 400 mG 1 Tablet(s) Oral daily  valGANciclovir 900 milliGRAM(s) Oral daily      ROS:   General:  No  fevers, chills, night sweats, fatigue  Eyes:  Good vision, no reported pain  ENT:  No sore throat, pain, runny nose  CV:  No pain, palpitations  Pulm:  No dyspnea, cough  GI:  See HPI, otherwise negative  :  No  incontinence, nocturia  Muscle:  No pain, weakness  Neuro:  No memory problems  Psych:  No insomnia, mood problems, depression  Endocrine:  No polyuria, polydipsia, cold/heat intolerance  Heme:  No petechiae, ecchymosis, easy bruisability  Skin:  No rash    PHYSICAL EXAM:   Vital Signs:  Vital Signs Last 24 Hrs  T(C): 37.1 (07 Aug 2020 09:14), Max: 37.1 (07 Aug 2020 01:00)  T(F): 98.8 (07 Aug 2020 09:14), Max: 98.8 (07 Aug 2020 09:14)  HR: 89 (07 Aug 2020 09:14) (80 - 94)  BP: 111/59 (07 Aug 2020 09:14) (106/56 - 123/62)  BP(mean): --  RR: 20 (07 Aug 2020 09:14) (18 - 20)  SpO2: 98% (07 Aug 2020 09:14) (70% - 100%)  Daily     Daily Weight in k.8 (07 Aug 2020 05:00)    GENERAL: no acute distress  NEURO: alert, no asterixis  HEENT: anicteric sclera, no conjunctival pallor appreciated  CHEST: no respiratory distress, no accessory muscle use  CARDIAC: regular rate, rhythm  ABDOMEN: soft, non-tender, non-distended, no rebound or guarding  EXTREMITIES: warm, well perfused, no edema  SKIN: no lesions noted    LABS: reviewed                        8.1    2.65  )-----------( 100      ( 07 Aug 2020 05:28 )             25.1     08-07    138  |  103  |  38<H>  ----------------------------<  215<H>  5.0   |  24  |  1.10    Ca    8.8      07 Aug 2020 05:28  Phos  2.9     08-07  Mg     1.7     08-07    TPro  5.6<L>  /  Alb  3.3  /  TBili  0.4  /  DBili  0.2  /  AST  20  /  ALT  30  /  AlkPhos  111  08-07    LIVER FUNCTIONS - ( 07 Aug 2020 05:28 )  Alb: 3.3 g/dL / Pro: 5.6 g/dL / ALK PHOS: 111 U/L / ALT: 30 U/L / AST: 20 U/L / GGT: x             Interval Diagnostic Studies: see sunrise for full report

## 2020-08-07 NOTE — PROGRESS NOTE ADULT - ASSESSMENT
63 y/o M PMHx decompensated JEAN Cirrhosis on transplant list, DMII, HFpEF, recent admission for ESBL E coli prostatic abscess 2/2 4 wks ertapenem, hx of ESBL UTIs with prostate abscess s/p IV abx, recent VRE urinary colonization came to hospital for worsening jaundice and episode of confusion, resolved PTA. s/p treatment course for possible VRE UTI. Now s/p OLT on 7/2/20. Course complicated by Encephalopathy and Tremors. Blood Cultures (7/10) with VRE sensitive to Linezolid. CT C/A/P without obvious intraabdominal source. Hypoxia prior to positive BCx which may be related to sepsis CT Chest (7/11) without evidence of pneumonia. Had L groin fluid collection which appears to be consistent with seroma - sample obtained for culture also negative. No drainage, erythema or underlying fluctuance was appreciated at the right heel. MRI prior to transplant without obvious infection. Transferred from [facility] to [facility] on 7/17/2020.    RECOMMENDATIONS:    #Leukopenia  - Continue to monitor counts    #Encephalopathy (improving)  - Switched to Everolimus on 7/27 to avoid CNI CNS Toxicity   - CT result(7/31): Slight progression to atrophic changes compared with 6/11/2020 which may be the result of treatment-related changes. No other significant pathology  - Continue to monitor    #VRE Bacteremia, L Groin Collection, Encephalopathy (improving)  - s/p Linezolid and Daptomycin ended on 7/26 for VRE bacteremia post-OLT  - Abd US showed 9cm Left groin collection: likely represents a seroma or lymphocele; aspirated - cx: PMN seen, no organism seen  - Continue monitoring cylosporine levels  - TTE negative for endocarditis  - UA/UC on 7/20 no UTI; UA on 7/30 negative  - BCx on 7/20 NGTD    #s/p OLT   - Continue bactrim, valcyte (CMV D+/R-) for prophylaxis  - Monitor patient for tremors - was on tacrolimus(CI), then cylosporine(CI), now on Everolimus(mTor I)    #Right heel wound  --Podiatry on board    --Local wound care     I will continue to follow. Please feel free to contact me with any further questions.    Eusebio Pérez M.D.  [facility] Division of Infectious Disease  8AM-5PM: Pager Number [phone]  After Hours (or if no response): Please contact the Infectious Diseases Office at [phone]     The above assessment and plan were discussed with [facility] NP

## 2020-08-07 NOTE — PROGRESS NOTE ADULT - ASSESSMENT
64 M hx of DM, HLD, GERD, HFpEF with mild LV diastolic dysfunction, and decompensated JEAN cirrhosis c/b ascites with hx of SBP, HE, duodenal ulcer, GAVE and duodenal AVM s/p APC (last on 10/11/19), s/p liver transplant this admission (7/2/20). Post-op course c/b tacrolimus toxicity and hospital derlirium, improving after stopping CNI and replacing w/ everolimus.     Impression:  #S/p liver transplant 7/2: liver enzymes continue to downtrend  OR details:  - L groin cutdown for vv bypass   - anastomosis: bicaval end-end/CBD duct-duct/HA & PV end-end, all standard anatomy.    - IOC with good flow  - Simulect induction. 500mg Solumedrol  - JPs x3 with T-Tube  - 14U pRBC, 14U FFP, 10 PLT, 11 CRYO, 500mL returned from cell saver, EBL 5L, UOP 1100mL  Recipient Info:   ABO: A  CMV:  Neg  EBV Positive  Donor:  Donor ID:  [id number] Match: [id number]  Age: 29 ABO:  A1 High Risk:   No COD: Cardiovascular  Anti CMV positive, EBV IgG- positive  HepBcAb-neg, Hepatitis C-DANA- neg, Hepatitis C ab-neg    #Halluciniations/agitation/AMS/tremors – due to CNI toxicity, improving now that pt is off CNI and on everolimus  #Sacral decubitus ulcer: stage I, does not appear infected  #GI bleed: Resolved. Hgb stable with no overt bleeding post-transplant. 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 GAVE and PHG likely improved pos ttransplant.  #R posterior heel wound w/ overlying eschar –Local wound care. no signs of infections, XR neg for gas, evaluated by podiatry and ID. MRI w/o contrast limited, but no overt signs of active infection.  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     Recommendation:  - continue with everolimus dosed per transplant surgery, f/u levels (3.5mg BID)  - continue Cellcept dosing as per transplant surgery  - continue prednisone 5 mg PO daily  - continue w/ PPI daily  - frequent reorientation and visitation   - normalized sleep/wake cycle environment  - continue nystatin, Bactrim and Valcyte ppx  - physical therapy daily/BID  - continue with mag oxide 200 mg BID  - monitor Hb on daily CBC - continue PO PPI BID, monitor bowel movements  - c/w ASA 81mg daily  - continue to hold diuretics  - outpatient follow up with Dr. Medina within 1-2 weeks post discharge, we will arrange this appointment      Ze Dominguez  Gastroenterology Fellow  AT NIGHT AND ON WEEKENDS:  Contact on-call GI fellow via answering service ([phone]) from 5pm-8am and on weekends/holidays  MONDAY-FRIDAY 8AM-5PM:  Available via [employer] Teams  Call [phone] ([facility]) or Page [phone] ([facility] from 8am-5pm M-F

## 2020-08-07 NOTE — PROGRESS NOTE BEHAVIORAL HEALTH - NSBHCONSULTMEDS_PSY_A_CORE FT
-decrease Remeron to 15mg po qhs   -start Seroquel 25mg po q1800 (if qtc <500ms on ekg)  -start melatonin 3mg po qhs
1) start standing haldol 1 mg IV QHS for confusion/agitation, and haldol 1 mg po/im/iv BID PRN for acute agitation, f/u QTc < 500    2) no SSRIs please, as pt has been on zyvox for past 10-14 days, concern for serotonin syndrome if SSRIs added    3) consider melatonin 3 mg qhs    4) Minimize use of benzos, opioids, anticholinergics, or other deliriogenic agents when possible.  Maintain sleep wake cycle.  Provide frequent reorientation and redirection.  Family member at bedside if possible. Assess for need for glasses and hearing aid (if applicable).    5) Pt does not meet criteria for psychiatric hospitalization.  Recommend outpt psych f/u at [facility] after d/c- [phone].   CL Psych will follow.
1) consider melatonin 3 mg qhs    2) haldol 1 mg po/im/iv BID PRN for acute agitation, f/u QTc < 500    3) Minimize use of benzos, opioids, anticholinergics, or other deliriogenic agents when possible.  Maintain sleep wake cycle.  Provide frequent reorientation and redirection.  Family member at bedside if possible. Assess for need for glasses and hearing aid (if applicable).    4) Pt does not meet criteria for psychiatric hospitalization.  Recommend outpt psych f/u at [facility] after d/c- [phone].   CL Psych will follow.

## 2020-08-08 LAB
ALBUMIN SERPL ELPH-MCNC: 3.1 G/DL — LOW (ref 3.3–5)
ALP SERPL-CCNC: 100 U/L — SIGNIFICANT CHANGE UP (ref 40–120)
ALT FLD-CCNC: 25 U/L — SIGNIFICANT CHANGE UP (ref 10–45)
AMMONIA BLD-MCNC: 52 UMOL/L — SIGNIFICANT CHANGE UP (ref 11–55)
ANION GAP SERPL CALC-SCNC: 11 MMOL/L — SIGNIFICANT CHANGE UP (ref 5–17)
APTT BLD: 28.4 SEC — SIGNIFICANT CHANGE UP (ref 27.5–35.5)
AST SERPL-CCNC: 24 U/L — SIGNIFICANT CHANGE UP (ref 10–40)
BILIRUB DIRECT SERPL-MCNC: 0.1 MG/DL — SIGNIFICANT CHANGE UP (ref 0–0.2)
BILIRUB INDIRECT FLD-MCNC: 0.3 MG/DL — SIGNIFICANT CHANGE UP (ref 0.2–1)
BILIRUB SERPL-MCNC: 0.4 MG/DL — SIGNIFICANT CHANGE UP (ref 0.2–1.2)
BUN SERPL-MCNC: 31 MG/DL — HIGH (ref 7–23)
CALCIUM SERPL-MCNC: 9.3 MG/DL — SIGNIFICANT CHANGE UP (ref 8.4–10.5)
CHLORIDE SERPL-SCNC: 105 MMOL/L — SIGNIFICANT CHANGE UP (ref 96–108)
CO2 SERPL-SCNC: 22 MMOL/L — SIGNIFICANT CHANGE UP (ref 22–31)
CREAT SERPL-MCNC: 1.05 MG/DL — SIGNIFICANT CHANGE UP (ref 0.5–1.3)
GLUCOSE BLDC GLUCOMTR-MCNC: 158 MG/DL — HIGH (ref 70–99)
GLUCOSE BLDC GLUCOMTR-MCNC: 162 MG/DL — HIGH (ref 70–99)
GLUCOSE BLDC GLUCOMTR-MCNC: 192 MG/DL — HIGH (ref 70–99)
GLUCOSE BLDC GLUCOMTR-MCNC: 210 MG/DL — HIGH (ref 70–99)
GLUCOSE BLDC GLUCOMTR-MCNC: 243 MG/DL — HIGH (ref 70–99)
GLUCOSE SERPL-MCNC: 136 MG/DL — HIGH (ref 70–99)
HCT VFR BLD CALC: 25.5 % — LOW (ref 39–50)
HGB BLD-MCNC: 8.5 G/DL — LOW (ref 13–17)
INR BLD: 1 RATIO — SIGNIFICANT CHANGE UP (ref 0.88–1.16)
MAGNESIUM SERPL-MCNC: 1.8 MG/DL — SIGNIFICANT CHANGE UP (ref 1.6–2.6)
MCHC RBC-ENTMCNC: 32.3 PG — SIGNIFICANT CHANGE UP (ref 27–34)
MCHC RBC-ENTMCNC: 33.3 GM/DL — SIGNIFICANT CHANGE UP (ref 32–36)
MCV RBC AUTO: 97 FL — SIGNIFICANT CHANGE UP (ref 80–100)
NRBC # BLD: 0 /100 WBCS — SIGNIFICANT CHANGE UP (ref 0–0)
PHOSPHATE SERPL-MCNC: 2.9 MG/DL — SIGNIFICANT CHANGE UP (ref 2.5–4.5)
PLATELET # BLD AUTO: 106 K/UL — LOW (ref 150–400)
POTASSIUM SERPL-MCNC: 4.8 MMOL/L — SIGNIFICANT CHANGE UP (ref 3.5–5.3)
POTASSIUM SERPL-SCNC: 4.8 MMOL/L — SIGNIFICANT CHANGE UP (ref 3.5–5.3)
PROCALCITONIN SERPL-MCNC: 0.2 NG/ML — HIGH (ref 0.02–0.1)
PROT SERPL-MCNC: 5.4 G/DL — LOW (ref 6–8.3)
PROTHROM AB SERPL-ACNC: 11.9 SEC — SIGNIFICANT CHANGE UP (ref 10.6–13.6)
RBC # BLD: 2.63 M/UL — LOW (ref 4.2–5.8)
RBC # FLD: 17.2 % — HIGH (ref 10.3–14.5)
SODIUM SERPL-SCNC: 138 MMOL/L — SIGNIFICANT CHANGE UP (ref 135–145)
WBC # BLD: 2.59 K/UL — LOW (ref 3.8–10.5)
WBC # FLD AUTO: 2.59 K/UL — LOW (ref 3.8–10.5)

## 2020-08-08 PROCEDURE — 99232 SBSQ HOSP IP/OBS MODERATE 35: CPT | Mod: GC

## 2020-08-08 PROCEDURE — 99232 SBSQ HOSP IP/OBS MODERATE 35: CPT | Mod: GC,24

## 2020-08-08 RX ORDER — INSULIN LISPRO 100/ML
6 VIAL (ML) SUBCUTANEOUS
Refills: 0 | Status: DISCONTINUED | OUTPATIENT
Start: 2020-08-08 | End: 2020-08-11

## 2020-08-08 RX ORDER — DIPHENHYDRAMINE HCL 50 MG
25 CAPSULE ORAL
Refills: 0 | Status: DISCONTINUED | OUTPATIENT
Start: 2020-08-08 | End: 2020-08-11

## 2020-08-08 RX ORDER — MAGNESIUM SULFATE 500 MG/ML
1 VIAL (ML) INJECTION ONCE
Refills: 0 | Status: COMPLETED | OUTPATIENT
Start: 2020-08-08 | End: 2020-08-08

## 2020-08-08 RX ADMIN — TAMSULOSIN HYDROCHLORIDE 0.8 MILLIGRAM(S): 0.4 CAPSULE ORAL at 21:03

## 2020-08-08 RX ADMIN — Medication 500000 UNIT(S): at 12:58

## 2020-08-08 RX ADMIN — EVEROLIMUS 4 MILLIGRAM(S): 10 TABLET ORAL at 19:58

## 2020-08-08 RX ADMIN — MYCOPHENOLATE MOFETIL 750 MILLIGRAM(S): 250 CAPSULE ORAL at 19:57

## 2020-08-08 RX ADMIN — Medication 5 MILLIGRAM(S): at 05:52

## 2020-08-08 RX ADMIN — Medication 4 UNIT(S): at 09:24

## 2020-08-08 RX ADMIN — Medication 4 UNIT(S): at 14:30

## 2020-08-08 RX ADMIN — Medication 500000 UNIT(S): at 17:51

## 2020-08-08 RX ADMIN — NYSTATIN CREAM 1 APPLICATION(S): 100000 CREAM TOPICAL at 17:51

## 2020-08-08 RX ADMIN — NYSTATIN CREAM 1 APPLICATION(S): 100000 CREAM TOPICAL at 05:52

## 2020-08-08 RX ADMIN — Medication 6 UNIT(S): at 17:50

## 2020-08-08 RX ADMIN — INSULIN GLARGINE 6 UNIT(S): 100 INJECTION, SOLUTION SUBCUTANEOUS at 21:48

## 2020-08-08 RX ADMIN — Medication 500000 UNIT(S): at 05:52

## 2020-08-08 RX ADMIN — Medication 81 MILLIGRAM(S): at 12:57

## 2020-08-08 RX ADMIN — VALGANCICLOVIR 900 MILLIGRAM(S): 450 TABLET, FILM COATED ORAL at 12:58

## 2020-08-08 RX ADMIN — Medication 1: at 09:24

## 2020-08-08 RX ADMIN — Medication 2: at 17:50

## 2020-08-08 RX ADMIN — Medication 1 TABLET(S): at 12:58

## 2020-08-08 RX ADMIN — Medication 1: at 21:48

## 2020-08-08 RX ADMIN — Medication 1 DROP(S): at 12:58

## 2020-08-08 RX ADMIN — MAGNESIUM OXIDE 400 MG ORAL TABLET 800 MILLIGRAM(S): 241.3 TABLET ORAL at 17:51

## 2020-08-08 RX ADMIN — Medication 2: at 14:29

## 2020-08-08 RX ADMIN — PANTOPRAZOLE SODIUM 40 MILLIGRAM(S): 20 TABLET, DELAYED RELEASE ORAL at 09:11

## 2020-08-08 RX ADMIN — MAGNESIUM OXIDE 400 MG ORAL TABLET 800 MILLIGRAM(S): 241.3 TABLET ORAL at 09:10

## 2020-08-08 RX ADMIN — Medication 100 GRAM(S): at 09:11

## 2020-08-08 RX ADMIN — MYCOPHENOLATE MOFETIL 750 MILLIGRAM(S): 250 CAPSULE ORAL at 09:10

## 2020-08-08 RX ADMIN — CHLORHEXIDINE GLUCONATE 1 APPLICATION(S): 213 SOLUTION TOPICAL at 09:12

## 2020-08-08 RX ADMIN — EVEROLIMUS 4 MILLIGRAM(S): 10 TABLET ORAL at 09:11

## 2020-08-08 NOTE — PROGRESS NOTE ADULT - SUBJECTIVE AND OBJECTIVE BOX
TRANSPLANT SURGERY MULTIDISCIPLINARY NOTE    OLTx      Date:  7/2/2020       POD #37    Present:   Patient seen and examined with multidisciplinary team including Transplant Surgeon: Dr. Lucio, Transplant Nephrologist: Dr. Robin Patel, [facility] in am rounds with Dr. Lucio. Disciplines not in attendance will be notified of the plan.    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     Re-admitted on 6/11:   ·	worsening HE  ·	UTI/VRE: tx with linezolid 6/15-22, bactrim DS 6/22 - 7/2  ·	MISA/Hyponatremia  ·	UGI bleed (melena) s/p EGD (6/22) c/w hemorrhagic duodenitis/gastritis; Grade 2 EV; requiring multiple transfusions  ·	Known h/o R foot ulcer (MRI neg for OM)  ·	s/p OLTx on 7/2/2020, post op liver doppler with patent vessels  ·	post op course c/b VRE bacteremia (7/10), tx with linezolid 7/10-7/23, dapto 7/23-7/26  ·	post op course c/b delirium likely in setting of CNI toxicity; CT head neg (7/31); off FK 7/19, off cylco 7/27. Everolimus initiated 7/27     Interval events:  - no overnight events; afebrile, vitals stable.   - Awake and alert this AM. Appropriate and conversant  - Stable liver function tests  - Tolerating PO intake, no N/V  - Incontinent with BM  - WBC 2.59, yesterday 2.65.   -Seen by pulm yesterday, planning for sleep study on Monday. Will need to remain off CPAP starting Sun night.   -Seen by behavioral health yesterday as wife was concerned about patient's "aggressive behavior." Cleared by behavioral health to go home from their perspective. Nursing staff and providers have not noted pt to be aggressive since improvement in mental status. Has had significant improvement in mental status over past few days.     Potential Discharge date: pending clinical improvement, early next week.     Education:  Medications    Plan of care:  See Below      MEDICATIONS  (STANDING):  aspirin enteric coated 81 milliGRAM(s) Oral daily  chlorhexidine 2% Cloths 1 Application(s) Topical <User Schedule>  collagenase Ointment 1 Application(s) Topical daily  everolimus (ZORTRESS) 4 milliGRAM(s) Oral <User Schedule>  insulin glargine Injectable (LANTUS) 6 Unit(s) SubCutaneous at bedtime  insulin lispro (HumaLOG) corrective regimen sliding scale   SubCutaneous Before meals and at bedtime  insulin lispro Injectable (HumaLOG) 4 Unit(s) SubCutaneous three times a day before meals  magnesium oxide 800 milliGRAM(s) Oral two times a day with meals  mycophenolate mofetil 750 milliGRAM(s) Oral <User Schedule>  nystatin    Suspension 555291 Unit(s) Oral four times a day  nystatin Cream 1 Application(s) Topical two times a day  pantoprazole    Tablet 40 milliGRAM(s) Oral before breakfast  predniSONE   Tablet 5 milliGRAM(s) Oral daily  tamsulosin 0.8 milliGRAM(s) Oral at bedtime  trimethoprim   80 mG/sulfamethoxazole 400 mG 1 Tablet(s) Oral daily  valGANciclovir 900 milliGRAM(s) Oral daily    MEDICATIONS  (PRN):  artificial  tears Solution 1 Drop(s) Both EYES two times a day PRN Dry Eyes  diphenhydrAMINE 50 milliGRAM(s) Oral <User Schedule> PRN Rash and/or Itching      PAST MEDICAL & SURGICAL HISTORY:  GIB (gastrointestinal bleeding)  GERD with esophagitis: Gastritis &amp; Non Bleeding Ulcers  Hepatic encephalopathy  Obesity  Fatty liver disease, nonalcoholic  Renal stones: 25 years ago  Hypertension  Neuropathy  Hypercholesteremia  Diabetes  S/P cholecystectomy      Vital Signs Last 24 Hrs  T(C): 36.5 (08 Aug 2020 09:00), Max: 37.2 (08 Aug 2020 01:11)  T(F): 97.7 (08 Aug 2020 09:00), Max: 98.9 (08 Aug 2020 01:11)  HR: 81 (08 Aug 2020 09:00) (81 - 90)  BP: 114/57 (08 Aug 2020 09:00) (108/63 - 145/67)  BP(mean): 95 (07 Aug 2020 17:00) (95 - 95)  RR: 18 (08 Aug 2020 09:00) (18 - 18)  SpO2: 96% (08 Aug 2020 09:00) (85% - 100%)    I&O's Summary    07 Aug 2020 07:01  -  08 Aug 2020 07:00  --------------------------------------------------------  IN: 1120 mL / OUT: 2910 mL / NET: -1790 mL                       8.5    2.59  )-----------( 106      ( 08 Aug 2020 07:06 )             25.5     08-08    138  |  105  |  31<H>  ----------------------------<  136<H>  4.8   |  22  |  1.05    Ca    9.3      08 Aug 2020 07:06  Phos  2.9     08-08  Mg     1.8     08-08    TPro  5.4<L>  /  Alb  3.1<L>  /  TBili  0.4  /  DBili  0.1  /  AST  24  /  ALT  25  /  AlkPhos  100  08-08    Review of symptoms:   Gen: no weight changes  Skin: no rashes  Head/Eyes/Ears/Mouth: no headache, normal hearing, no changes to vision  Respiratory: no dyspnea, cough  CV: No chest pain, PND, orthopnea  GI: stool incontinence  : no pain on urination  MSK: no joint swelling  Neuro: No dizziness  Heme: no easy bruising or bleeding   Endo: no heat/cold intolerance  Psych: no significant nervousness, anxiety, depression    Physical Exam  Constitutional: NAD/WD/WN  Eyes: Anicteric, PERRLA  ENMT: nc/at. no thrush or ulcerations appreciated  Neck: no JVD, supple  Respiratory: CTA B/L, unlabored breathing   Cardiovascular: RRR  Gastrointestinal: Soft abdomen, non tender to touch at surgical site, ND  Genitourinary: Voiding spontaneously  Extremities: SCD's in place and working bilaterally  Vascular: Palpable dp pulses bilaterally  Neurological: A&O x3, following commands. Very pleasant  Musculoskeletal: Moving all extremities  Psychiatric: Responsive TRANSPLANT SURGERY MULTIDISCIPLINARY NOTE    OLTx      Date:  7/2/2020       POD #37    Present:   Patient seen and examined with multidisciplinary team including Transplant Surgeon: Dr. Lucio, [facility] in am rounds with Dr. Lucio. Disciplines not in attendance will be notified of the plan.    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     Re-admitted on 6/11:   ·	worsening HE  ·	UTI/VRE: tx with linezolid 6/15-22, bactrim DS 6/22 - 7/2  ·	MISA/Hyponatremia  ·	UGI bleed (melena) s/p EGD (6/22) c/w hemorrhagic duodenitis/gastritis; Grade 2 EV; requiring multiple transfusions  ·	Known h/o R foot ulcer (MRI neg for OM)  ·	s/p OLTx on 7/2/2020, post op liver doppler with patent vessels  ·	post op course c/b VRE bacteremia (7/10), tx with linezolid 7/10-7/23, dapto 7/23-7/26  ·	post op course c/b delirium likely in setting of CNI toxicity; CT head neg (7/31); off FK 7/19, off cylco 7/27. Everolimus initiated 7/27     Interval events:  - no overnight events; afebrile, vitals stable.   - Awake and alert this AM. Appropriate and conversant  - Stable liver function tests  - Tolerating PO intake, no N/V  - Incontinent with BM  - WBC 2.59, yesterday 2.65.   -Seen by pulm yesterday, planning for sleep study on Monday. Will need to remain off CPAP starting Sun night.   -Seen by behavioral health yesterday as wife was concerned about patient's "aggressive behavior." Cleared by behavioral health to go home from their perspective. Nursing staff and providers have not noted pt to be aggressive since improvement in mental status. Has had significant improvement in mental status over past few days.     Potential Discharge date: pending clinical improvement, early next week.     Education:  Medications    Plan of care:  See Below      MEDICATIONS  (STANDING):  aspirin enteric coated 81 milliGRAM(s) Oral daily  chlorhexidine 2% Cloths 1 Application(s) Topical <User Schedule>  collagenase Ointment 1 Application(s) Topical daily  everolimus (ZORTRESS) 4 milliGRAM(s) Oral <User Schedule>  insulin glargine Injectable (LANTUS) 6 Unit(s) SubCutaneous at bedtime  insulin lispro (HumaLOG) corrective regimen sliding scale   SubCutaneous Before meals and at bedtime  insulin lispro Injectable (HumaLOG) 4 Unit(s) SubCutaneous three times a day before meals  magnesium oxide 800 milliGRAM(s) Oral two times a day with meals  mycophenolate mofetil 750 milliGRAM(s) Oral <User Schedule>  nystatin    Suspension 626339 Unit(s) Oral four times a day  nystatin Cream 1 Application(s) Topical two times a day  pantoprazole    Tablet 40 milliGRAM(s) Oral before breakfast  predniSONE   Tablet 5 milliGRAM(s) Oral daily  tamsulosin 0.8 milliGRAM(s) Oral at bedtime  trimethoprim   80 mG/sulfamethoxazole 400 mG 1 Tablet(s) Oral daily  valGANciclovir 900 milliGRAM(s) Oral daily    MEDICATIONS  (PRN):  artificial  tears Solution 1 Drop(s) Both EYES two times a day PRN Dry Eyes  diphenhydrAMINE 50 milliGRAM(s) Oral <User Schedule> PRN Rash and/or Itching      PAST MEDICAL & SURGICAL HISTORY:  GIB (gastrointestinal bleeding)  GERD with esophagitis: Gastritis &amp; Non Bleeding Ulcers  Hepatic encephalopathy  Obesity  Fatty liver disease, nonalcoholic  Renal stones: 25 years ago  Hypertension  Neuropathy  Hypercholesteremia  Diabetes  S/P cholecystectomy      Vital Signs Last 24 Hrs  T(C): 36.5 (08 Aug 2020 09:00), Max: 37.2 (08 Aug 2020 01:11)  T(F): 97.7 (08 Aug 2020 09:00), Max: 98.9 (08 Aug 2020 01:11)  HR: 81 (08 Aug 2020 09:00) (81 - 90)  BP: 114/57 (08 Aug 2020 09:00) (108/63 - 145/67)  BP(mean): 95 (07 Aug 2020 17:00) (95 - 95)  RR: 18 (08 Aug 2020 09:00) (18 - 18)  SpO2: 96% (08 Aug 2020 09:00) (85% - 100%)    I&O's Summary    07 Aug 2020 07:01  -  08 Aug 2020 07:00  --------------------------------------------------------  IN: 1120 mL / OUT: 2910 mL / NET: -1790 mL                       8.5    2.59  )-----------( 106      ( 08 Aug 2020 07:06 )             25.5     08-08    138  |  105  |  31<H>  ----------------------------<  136<H>  4.8   |  22  |  1.05    Ca    9.3      08 Aug 2020 07:06  Phos  2.9     08-08  Mg     1.8     08-08    TPro  5.4<L>  /  Alb  3.1<L>  /  TBili  0.4  /  DBili  0.1  /  AST  24  /  ALT  25  /  AlkPhos  100  08-08    Review of symptoms:   Gen: no weight changes  Skin: no rashes  Head/Eyes/Ears/Mouth: no headache, normal hearing, no changes to vision  Respiratory: no dyspnea, cough  CV: No chest pain, PND, orthopnea  GI: stool incontinence  : no pain on urination  MSK: no joint swelling  Neuro: No dizziness  Heme: no easy bruising or bleeding   Endo: no heat/cold intolerance  Psych: no significant nervousness, anxiety, depression    Physical Exam  Constitutional: NAD/WD/WN  Eyes: Anicteric, PERRLA  ENMT: nc/at. no thrush or ulcerations appreciated  Neck: no JVD, supple  Respiratory: CTA B/L, unlabored breathing   Cardiovascular: RRR  Gastrointestinal: Soft abdomen, non tender to touch at surgical site, ND  Genitourinary: Voiding spontaneously  Extremities: SCD's in place and working bilaterally  Vascular: Palpable dp pulses bilaterally  Neurological: A&O x3, following commands. Very pleasant  Musculoskeletal: Moving all extremities  Psychiatric: Responsive

## 2020-08-08 NOTE — PROGRESS NOTE ADULT - SUBJECTIVE AND OBJECTIVE BOX
INTERVAL HPI/OVERNIGHT EVENTS:  No new overnight event.  No N/V/D.  Tolerating diet.    Allergies    codeine (Anaphylaxis)    Intolerances    General:  No wt loss, fevers, chills, night sweats, fatigue,   Eyes:  Good vision, no reported pain  ENT:  No sore throat, pain, runny nose, dysphagia  CV:  No pain, palpitations, hypo/hypertension  Resp:  No dyspnea, cough, tachypnea, wheezing  GI:  No pain, No nausea, No vomiting, No diarrhea, No constipation, No weight loss, No fever, No pruritis, No rectal bleeding, No tarry stools, No dysphagia,  :  No pain, bleeding, incontinence, nocturia  Muscle:  No pain, weakness  Neuro:  No weakness, tingling, memory problems  Psych:  No fatigue, insomnia, mood problems, depression  Endocrine:  No polyuria, polydipsia, cold/heat intolerance  Heme:  No petechiae, ecchymosis, easy bruisability  Skin:  No rash, tattoos, scars, edema      PHYSICAL EXAM:   Vital Signs:  Vital Signs Last 24 Hrs  T(C): 36.5 (09 Aug 2020 13:00), Max: 36.8 (08 Aug 2020 21:00)  T(F): 97.7 (09 Aug 2020 13:00), Max: 98.3 (08 Aug 2020 21:00)  HR: 88 (09 Aug 2020 13:00) (79 - 90)  BP: 156/72 (09 Aug 2020 13:00) (115/60 - 156/72)  BP(mean): 104 (09 Aug 2020 13:00) (104 - 104)  RR: 18 (09 Aug 2020 13:) (18 - 18)  SpO2: 100% (09 Aug 2020 13:00) (100% - 100%)  Daily     Daily Weight in k.5 (09 Aug 2020 05:00)I&O's Summary    08 Aug 2020 07:01  -  09 Aug 2020 07:00  --------------------------------------------------------  IN: 1180 mL / OUT: 1800 mL / NET: -620 mL    09 Aug 2020 07:01  -  09 Aug 2020 13:56  --------------------------------------------------------  IN: 580 mL / OUT: 550 mL / NET: 30 mL        GENERAL:  Appears stated age, well-groomed, well-nourished, no distress  HEENT:  NC/AT,  conjunctivae clear and pink, no thyromegaly, nodules, adenopathy, no JVD, sclera -anicteric  CHEST:  Full & symmetric excursion, no increased effort, breath sounds clear  HEART:  Regular rhythm, S1, S2, no murmur/rub/S3/S4, no abdominal bruit, no edema  ABDOMEN:  Soft, non-tender, non-distended, normoactive bowel sounds,  no masses ,no hepato-splenomegaly, no signs of chronic liver disease  EXTEREMITIES:  no cyanosis,clubbing or edema  SKIN:  No rash/erythema/ecchymoses/petechiae/wounds/abscess/warm/dry  NEURO:  Alert, oriented, no asterixis, no tremor, no encephalopathy      LABS:                        8.5    2.30  )-----------( 103      ( 09 Aug 2020 05:58 )             26.0     08-09    139  |  103  |  33<H>  ----------------------------<  200<H>  4.5   |  23  |  1.06    Ca    8.8      09 Aug 2020 05:58  Phos  3.1     08-  Mg     1.9     08-    TPro  5.7<L>  /  Alb  3.3  /  TBili  0.4  /  DBili  0.2  /  AST  16  /  ALT  26  /  AlkPhos  111  08-09    PT/INR - ( 09 Aug 2020 05:58 )   PT: 12.1 sec;   INR: 1.02 ratio         PTT - ( 09 Aug 2020 05:59 )  PTT:28.1 sec    amylase   lipase  RADIOLOGY & ADDITIONAL TESTS:

## 2020-08-08 NOTE — PROGRESS NOTE ADULT - ASSESSMENT
64 M hx of DM, HLD, GERD, HFpEF with mild LV diastolic dysfunction, and decompensated JEAN cirrhosis c/b ascites with hx of SBP, HE, duodenal ulcer, GAVE and duodenal AVM s/p APC (last on 10/11/19), s/p liver transplant this admission (7/2/20). Post-op course c/b tacrolimus toxicity and hospital derlirium, improving after stopping CNI and replacing w/ everolimus.     Impression:    #S/p liver transplant 7/2: liver enzymes normalized.   OR details:  - L groin cutdown for vv bypass   - anastomosis: bicaval end-end/CBD duct-duct/HA & PV end-end, all standard anatomy.    - IOC with good flow  - Simulect induction. 500mg Solumedrol  - JPs x3 with T-Tube  - 14U pRBC, 14U FFP, 10 PLT, 11 CRYO, 500mL returned from cell saver, EBL 5L, UOP 1100mL  Recipient Info:   ABO: A  CMV:  Neg  EBV Positive  Donor:  Donor ID:  [id number] Match: [id number]  Age: 29 ABO:  A1 High Risk:   No COD: Cardiovascular  Anti CMV positive, EBV IgG- positive  HepBcAb-neg, Hepatitis C-DANA- neg, Hepatitis C ab-neg    #Halluciniations/agitation/AMS/tremors – due to CNI toxicity, improving now that CNI discontinued and replaced with everolimus  #Sacral decubitus ulcer: stage I, does not appear infected  #R posterior heel wound w/ overlying eschar –Local wound care. no signs of infections, XR neg for gas, evaluated by podiatry and ID. MRI w/o contrast limited, but no overt signs of active infection.  #GI bleed: Resolved. Hgb stable with no overt bleeding post-transplant. 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 GAVE and PHG likely improved pos ttransplant.  #ESBL UTI with h/o prostatic abscess: Resolved with IV abx  #VRE bacteremia: Resolved with Linezolid and meropenem     Recommendation:  - continue with everolimus dosed per Transplant surgery, f/u daily levels (3.5mg BID)  - continue Cellcept dosing as per Transplant surgery  - continue prednisone 5 mg PO daily  - continue w/ PPI daily for GI prophylaxis   - frequent reorientation and visitation, normalized sleep/wake cycle environment to mitigate hospital delerium    - continue nystatin, Bactrim and Valcyte ppx  - physical therapy daily/BID  - continue with mag oxide 200 mg BID  - monitor Hb on daily CBC - continue PO PPI BID, monitor bowel movements  - c/w ASA 81mg daily  - continue to hold diuretics  - outpatient follow up with Dr. Medina within 1-2 weeks post discharge, we will arrange this appointment    Vivian Guardado PGY-5  Gastroenterology Fellow  Pager #[phone]  E-mail [email or web address]  Call [phone] to speak to answering service for on-call GI fellow 5pm-7am and weekends.

## 2020-08-08 NOTE — PROGRESS NOTE ADULT - ATTENDING COMMENTS
agree with above  excellent graft function  immunosuppression everolimus, mmf and steroids  awaiting sleep apnea study

## 2020-08-08 NOTE — PROGRESS NOTE ADULT - ASSESSMENT
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 c/b VRE bactermia and delirium    [] POD 36 s/p OLT   - Good graft function, LFTs stable   - Immuno:  Everolimus 4mg bid, MMF 750mg bid, Pred 5     -->Tacro DC'd 7/19. Cyclosporine DC'd 7/27.  Changed to Everolimus on 7/27 due to persistent delirium/AMS, now with improvement     -->Daily Everolimus leve  - PPX: Valcyte/Nystatin/Bactrim  - Mental status improving slowly.  CT Head negative  - Diet: Regular, tolerating, PO intake improving  - Continue ASA 81mg daily  - h/o UGI bleed: Protonix 40mg daily  - DVT PPx: SCDs at all times  - Sacral decub: seen by wound care, recs appreciated. Healing well.   - Pain control PRN  - keep Mag >2  - Uses CPAP overnight; was observed off CPAP 8/6 night, was noted to have several episodes of desaturation. Seen by pulm 8/7, planned for sleep study  8/10. Will need to keep CPAP off starting Sun night in preparation for the study.    [] VRE bacteremia (7/10)  - tx with linezolid 7/10-7/23,  Daptomycin 7/23-7/26   - BCx from 7/20 with NG, UCx (7/20) with NG     [] DM  - Appreciate Endocrinology recommendations  - Continue lantus 5u qhs and ISS  - Resume premeal insulin 2 U Humalog  - Fingersticks ac and qhs    [] Heel ulcer  - Healing well. Keep open to air.   - Appreciate podiatry recommendations    [] Dispo:    - continue 1:1 overnight. DC 1:1 during day.   - expect d/c home with home PT, potentially early next week  - daily PT/OT (team aware)  - discharge planning in progress w/ Liver transplant

## 2020-08-08 NOTE — PROGRESS NOTE ADULT - SUBJECTIVE AND OBJECTIVE BOX
Chief Complaint:  Patient is a 64y old  Male who presents with a chief complaint of Liver failure, hepatic encephalopathy (08 Aug 2020 10:08)      Interval Events:     Feels well. State he needs a sleep study prior to hospital discharge.     Allergies:  codeine (Anaphylaxis)      Hospital Medications:  artificial  tears Solution 1 Drop(s) Both EYES two times a day PRN  aspirin enteric coated 81 milliGRAM(s) Oral daily  chlorhexidine 2% Cloths 1 Application(s) Topical <User Schedule>  collagenase Ointment 1 Application(s) Topical daily  diphenhydrAMINE 50 milliGRAM(s) Oral <User Schedule> PRN  everolimus (ZORTRESS) 4 milliGRAM(s) Oral <User Schedule>  insulin glargine Injectable (LANTUS) 6 Unit(s) SubCutaneous at bedtime  insulin lispro (HumaLOG) corrective regimen sliding scale   SubCutaneous Before meals and at bedtime  insulin lispro Injectable (HumaLOG) 4 Unit(s) SubCutaneous three times a day before meals  magnesium oxide 800 milliGRAM(s) Oral two times a day with meals  mycophenolate mofetil 750 milliGRAM(s) Oral <User Schedule>  nystatin    Suspension 632324 Unit(s) Oral four times a day  nystatin Cream 1 Application(s) Topical two times a day  pantoprazole    Tablet 40 milliGRAM(s) Oral before breakfast  predniSONE   Tablet 5 milliGRAM(s) Oral daily  tamsulosin 0.8 milliGRAM(s) Oral at bedtime  trimethoprim   80 mG/sulfamethoxazole 400 mG 1 Tablet(s) Oral daily  valGANciclovir 900 milliGRAM(s) Oral daily      PMHX/PSHX:  GIB (gastrointestinal bleeding)  GERD with esophagitis  Hepatic encephalopathy  Obesity  Fatty liver disease, nonalcoholic  Renal stones  Hypertension  Neuropathy  Hypercholesteremia  Diabetes  S/P cholecystectomy  No significant past surgical history      Family history:  Family history of type 2 diabetes mellitus  Family history of hypertension  Family history of stomach cancer  No pertinent family history in first degree relatives      ROS:     General:  No weight loss, fevers, chills, night sweats, fatigue   Eyes:  No vision changes  ENT:  No sore throat, pain, runny nose  CV:  No chest pain, palpitations, dizziness   Resp:  No SOB, cough, wheezing  GI:  See HPI  :  No burning with urination, hematuria  Muscle:  No pain, weakness  Neuro:  No weakness/tingling, memory problems  Psych:  No fatigue, insomnia, mood problems, depression  Heme:  No easy bruisability  Skin:  No rash, edema      PHYSICAL EXAM:     GENERAL: no acute distress  NEURO: alert, no asterixis  HEENT: anicteric sclera, no conjunctival pallor appreciated  CHEST: no respiratory distress, no accessory muscle use  CARDIAC: regular rate, rhythm  ABDOMEN: soft, non-tender, non-distended, no rebound or guarding  EXTREMITIES: warm, well perfused, no edema  SKIN: no lesions noted      Vital Signs:  Vital Signs Last 24 Hrs  T(C): 36.5 (08 Aug 2020 09:00), Max: 37.2 (08 Aug 2020 01:11)  T(F): 97.7 (08 Aug 2020 09:00), Max: 98.9 (08 Aug 2020 01:11)  HR: 81 (08 Aug 2020 09:00) (81 - 90)  BP: 114/57 (08 Aug 2020 09:00) (108/63 - 145/67)  BP(mean): 95 (07 Aug 2020 17:00) (95 - 95)  RR: 18 (08 Aug 2020 09:00) (18 - 18)  SpO2: 96% (08 Aug 2020 09:00) (95% - 100%)  Daily     Daily     LABS:                        8.5    2.59  )-----------( 106      ( 08 Aug 2020 07:06 )             25.5     08-08    138  |  105  |  31<H>  ----------------------------<  136<H>  4.8   |  22  |  1.05    Ca    9.3      08 Aug 2020 07:06  Phos  2.9     08-08  Mg     1.8     08-08    TPro  5.4<L>  /  Alb  3.1<L>  /  TBili  0.4  /  DBili  0.1  /  AST  24  /  ALT  25  /  AlkPhos  100  08-08    LIVER FUNCTIONS - ( 08 Aug 2020 07:06 )  Alb: 3.1 g/dL / Pro: 5.4 g/dL / ALK PHOS: 100 U/L / ALT: 25 U/L / AST: 24 U/L / GGT: x           PT/INR - ( 08 Aug 2020 07:29 )   PT: 11.9 sec;   INR: 1.00 ratio    PTT - ( 08 Aug 2020 07:29 )  PTT:28.4 sec    Amylase Serum--      Lipase serum--       [id number]      Imaging:

## 2020-08-09 LAB
ALBUMIN SERPL ELPH-MCNC: 3.3 G/DL — SIGNIFICANT CHANGE UP (ref 3.3–5)
ALP SERPL-CCNC: 111 U/L — SIGNIFICANT CHANGE UP (ref 40–120)
ALT FLD-CCNC: 26 U/L — SIGNIFICANT CHANGE UP (ref 10–45)
ANION GAP SERPL CALC-SCNC: 13 MMOL/L — SIGNIFICANT CHANGE UP (ref 5–17)
APTT BLD: 28.1 SEC — SIGNIFICANT CHANGE UP (ref 27.5–35.5)
AST SERPL-CCNC: 16 U/L — SIGNIFICANT CHANGE UP (ref 10–40)
BILIRUB DIRECT SERPL-MCNC: 0.2 MG/DL — SIGNIFICANT CHANGE UP (ref 0–0.2)
BILIRUB INDIRECT FLD-MCNC: 0.2 MG/DL — SIGNIFICANT CHANGE UP (ref 0.2–1)
BILIRUB SERPL-MCNC: 0.4 MG/DL — SIGNIFICANT CHANGE UP (ref 0.2–1.2)
BUN SERPL-MCNC: 33 MG/DL — HIGH (ref 7–23)
CALCIUM SERPL-MCNC: 8.8 MG/DL — SIGNIFICANT CHANGE UP (ref 8.4–10.5)
CHLORIDE SERPL-SCNC: 103 MMOL/L — SIGNIFICANT CHANGE UP (ref 96–108)
CO2 SERPL-SCNC: 23 MMOL/L — SIGNIFICANT CHANGE UP (ref 22–31)
CREAT SERPL-MCNC: 1.06 MG/DL — SIGNIFICANT CHANGE UP (ref 0.5–1.3)
EVEROLIMUS, WHOLE BLOOD RESULT: 4.9 NG/ML — SIGNIFICANT CHANGE UP (ref 3–8)
GLUCOSE BLDC GLUCOMTR-MCNC: 189 MG/DL — HIGH (ref 70–99)
GLUCOSE BLDC GLUCOMTR-MCNC: 274 MG/DL — HIGH (ref 70–99)
GLUCOSE BLDC GLUCOMTR-MCNC: 287 MG/DL — HIGH (ref 70–99)
GLUCOSE BLDC GLUCOMTR-MCNC: 331 MG/DL — HIGH (ref 70–99)
GLUCOSE SERPL-MCNC: 200 MG/DL — HIGH (ref 70–99)
HCT VFR BLD CALC: 26 % — LOW (ref 39–50)
HGB BLD-MCNC: 8.5 G/DL — LOW (ref 13–17)
INR BLD: 1.02 RATIO — SIGNIFICANT CHANGE UP (ref 0.88–1.16)
MAGNESIUM SERPL-MCNC: 1.9 MG/DL — SIGNIFICANT CHANGE UP (ref 1.6–2.6)
MCHC RBC-ENTMCNC: 31.8 PG — SIGNIFICANT CHANGE UP (ref 27–34)
MCHC RBC-ENTMCNC: 32.7 GM/DL — SIGNIFICANT CHANGE UP (ref 32–36)
MCV RBC AUTO: 97.4 FL — SIGNIFICANT CHANGE UP (ref 80–100)
NRBC # BLD: 0 /100 WBCS — SIGNIFICANT CHANGE UP (ref 0–0)
PHOSPHATE SERPL-MCNC: 3.1 MG/DL — SIGNIFICANT CHANGE UP (ref 2.5–4.5)
PLATELET # BLD AUTO: 103 K/UL — LOW (ref 150–400)
POTASSIUM SERPL-MCNC: 4.5 MMOL/L — SIGNIFICANT CHANGE UP (ref 3.5–5.3)
POTASSIUM SERPL-SCNC: 4.5 MMOL/L — SIGNIFICANT CHANGE UP (ref 3.5–5.3)
PROCALCITONIN SERPL-MCNC: 0.2 NG/ML — HIGH (ref 0.02–0.1)
PROT SERPL-MCNC: 5.7 G/DL — LOW (ref 6–8.3)
PROTHROM AB SERPL-ACNC: 12.1 SEC — SIGNIFICANT CHANGE UP (ref 10.6–13.6)
RBC # BLD: 2.67 M/UL — LOW (ref 4.2–5.8)
RBC # FLD: 17 % — HIGH (ref 10.3–14.5)
SODIUM SERPL-SCNC: 139 MMOL/L — SIGNIFICANT CHANGE UP (ref 135–145)
WBC # BLD: 2.3 K/UL — LOW (ref 3.8–10.5)
WBC # FLD AUTO: 2.3 K/UL — LOW (ref 3.8–10.5)

## 2020-08-09 PROCEDURE — 99232 SBSQ HOSP IP/OBS MODERATE 35: CPT | Mod: GC

## 2020-08-09 PROCEDURE — 99232 SBSQ HOSP IP/OBS MODERATE 35: CPT | Mod: GC,24

## 2020-08-09 RX ORDER — EVEROLIMUS 10 MG/1
4.5 TABLET ORAL
Refills: 0 | Status: DISCONTINUED | OUTPATIENT
Start: 2020-08-09 | End: 2020-08-11

## 2020-08-09 RX ORDER — MAGNESIUM SULFATE 500 MG/ML
1 VIAL (ML) INJECTION ONCE
Refills: 0 | Status: COMPLETED | OUTPATIENT
Start: 2020-08-09 | End: 2020-08-09

## 2020-08-09 RX ORDER — MYCOPHENOLATE MOFETIL 250 MG/1
500 CAPSULE ORAL
Refills: 0 | Status: DISCONTINUED | OUTPATIENT
Start: 2020-08-09 | End: 2020-08-11

## 2020-08-09 RX ADMIN — Medication 100 GRAM(S): at 12:07

## 2020-08-09 RX ADMIN — MYCOPHENOLATE MOFETIL 750 MILLIGRAM(S): 250 CAPSULE ORAL at 08:37

## 2020-08-09 RX ADMIN — Medication 500000 UNIT(S): at 00:12

## 2020-08-09 RX ADMIN — Medication 500000 UNIT(S): at 12:07

## 2020-08-09 RX ADMIN — Medication 6 UNIT(S): at 12:08

## 2020-08-09 RX ADMIN — MAGNESIUM OXIDE 400 MG ORAL TABLET 800 MILLIGRAM(S): 241.3 TABLET ORAL at 17:05

## 2020-08-09 RX ADMIN — NYSTATIN CREAM 1 APPLICATION(S): 100000 CREAM TOPICAL at 05:55

## 2020-08-09 RX ADMIN — NYSTATIN CREAM 1 APPLICATION(S): 100000 CREAM TOPICAL at 17:06

## 2020-08-09 RX ADMIN — MAGNESIUM OXIDE 400 MG ORAL TABLET 800 MILLIGRAM(S): 241.3 TABLET ORAL at 08:33

## 2020-08-09 RX ADMIN — Medication 6 UNIT(S): at 17:06

## 2020-08-09 RX ADMIN — VALGANCICLOVIR 900 MILLIGRAM(S): 450 TABLET, FILM COATED ORAL at 12:07

## 2020-08-09 RX ADMIN — Medication 500000 UNIT(S): at 17:05

## 2020-08-09 RX ADMIN — PANTOPRAZOLE SODIUM 40 MILLIGRAM(S): 20 TABLET, DELAYED RELEASE ORAL at 08:32

## 2020-08-09 RX ADMIN — MYCOPHENOLATE MOFETIL 500 MILLIGRAM(S): 250 CAPSULE ORAL at 17:06

## 2020-08-09 RX ADMIN — EVEROLIMUS 4 MILLIGRAM(S): 10 TABLET ORAL at 09:13

## 2020-08-09 RX ADMIN — Medication 6 UNIT(S): at 08:32

## 2020-08-09 RX ADMIN — EVEROLIMUS 4.5 MILLIGRAM(S): 10 TABLET ORAL at 20:22

## 2020-08-09 RX ADMIN — TAMSULOSIN HYDROCHLORIDE 0.8 MILLIGRAM(S): 0.4 CAPSULE ORAL at 21:05

## 2020-08-09 RX ADMIN — Medication 500000 UNIT(S): at 23:36

## 2020-08-09 RX ADMIN — Medication 81 MILLIGRAM(S): at 12:07

## 2020-08-09 RX ADMIN — Medication 1 APPLICATION(S): at 14:02

## 2020-08-09 RX ADMIN — INSULIN GLARGINE 6 UNIT(S): 100 INJECTION, SOLUTION SUBCUTANEOUS at 21:52

## 2020-08-09 RX ADMIN — Medication 5 MILLIGRAM(S): at 05:54

## 2020-08-09 RX ADMIN — Medication 3: at 12:08

## 2020-08-09 RX ADMIN — Medication 3: at 17:07

## 2020-08-09 RX ADMIN — Medication 4: at 21:50

## 2020-08-09 RX ADMIN — Medication 500000 UNIT(S): at 05:52

## 2020-08-09 RX ADMIN — Medication 1 TABLET(S): at 12:07

## 2020-08-09 RX ADMIN — Medication 1: at 08:32

## 2020-08-09 NOTE — PROGRESS NOTE ADULT - SUBJECTIVE AND OBJECTIVE BOX
TRANSPLANT SURGERY MULTIDISCIPLINARY NOTE    OLTx      Date:  7/2/2020       POD #38    Present:   Patient seen and examined with multidisciplinary team including Transplant Surgeon: Dr. Lucio, [facility] in am rounds with Dr. Lucio. Disciplines not in attendance will be notified of the plan.    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     Re-admitted on 6/11:   ·	worsening HE  ·	UTI/VRE: tx with linezolid 6/15-22, bactrim DS 6/22 - 7/2  ·	MISA/Hyponatremia  ·	UGI bleed (melena) s/p EGD (6/22) c/w hemorrhagic duodenitis/gastritis; Grade 2 EV; requiring multiple transfusions  ·	Known h/o R foot ulcer (MRI neg for OM)  ·	s/p OLTx on 7/2/2020, post op liver doppler with patent vessels  ·	post op course c/b VRE bacteremia (7/10), tx with linezolid 7/10-7/23, dapto 7/23-7/26  ·	post op course c/b delirium likely in setting of CNI toxicity; CT head neg (7/31); off FK 7/19, off cylco 7/27. Everolimus initiated 7/27     Interval events:  - no overnight events; afebrile, vitals stable.   - Awake and alert this AM. Appropriate and conversant; seen ambulating w/ PT this AM.  - Stable liver function tests  - Tolerating PO intake, no N/V  - Incontinent with BM  - WBC still downtrending, 2.3 this AM from 2.5.   -Pulm following, sleep study tomorrow, will remain off of CPAP tonight.    Potential Discharge date: pending clinical improvemen    Education:  Medications    Plan of care:  See Below      MEDICATIONS  (STANDING):  aspirin enteric coated 81 milliGRAM(s) Oral daily  chlorhexidine 2% Cloths 1 Application(s) Topical <User Schedule>  collagenase Ointment 1 Application(s) Topical daily  everolimus (ZORTRESS) 4 milliGRAM(s) Oral <User Schedule>  insulin glargine Injectable (LANTUS) 6 Unit(s) SubCutaneous at bedtime  insulin lispro (HumaLOG) corrective regimen sliding scale   SubCutaneous Before meals and at bedtime  insulin lispro Injectable (HumaLOG) 4 Unit(s) SubCutaneous three times a day before meals  magnesium oxide 800 milliGRAM(s) Oral two times a day with meals  mycophenolate mofetil 750 milliGRAM(s) Oral <User Schedule>  nystatin    Suspension 264913 Unit(s) Oral four times a day  nystatin Cream 1 Application(s) Topical two times a day  pantoprazole    Tablet 40 milliGRAM(s) Oral before breakfast  predniSONE   Tablet 5 milliGRAM(s) Oral daily  tamsulosin 0.8 milliGRAM(s) Oral at bedtime  trimethoprim   80 mG/sulfamethoxazole 400 mG 1 Tablet(s) Oral daily  valGANciclovir 900 milliGRAM(s) Oral daily    MEDICATIONS  (PRN):  artificial  tears Solution 1 Drop(s) Both EYES two times a day PRN Dry Eyes  diphenhydrAMINE 50 milliGRAM(s) Oral <User Schedule> PRN Rash and/or Itching      PAST MEDICAL & SURGICAL HISTORY:  GIB (gastrointestinal bleeding)  GERD with esophagitis: Gastritis &amp; Non Bleeding Ulcers  Hepatic encephalopathy  Obesity  Fatty liver disease, nonalcoholic  Renal stones: 25 years ago  Hypertension  Neuropathy  Hypercholesteremia  Diabetes  S/P cholecystectomy      Vital Signs Last 24 Hrs  T(C): 36.5 (08 Aug 2020 09:00), Max: 37.2 (08 Aug 2020 01:11)  T(F): 97.7 (08 Aug 2020 09:00), Max: 98.9 (08 Aug 2020 01:11)  HR: 81 (08 Aug 2020 09:00) (81 - 90)  BP: 114/57 (08 Aug 2020 09:00) (108/63 - 145/67)  BP(mean): 95 (07 Aug 2020 17:00) (95 - 95)  RR: 18 (08 Aug 2020 09:00) (18 - 18)  SpO2: 96% (08 Aug 2020 09:00) (85% - 100%)    I&O's Summary    07 Aug 2020 07:01  -  08 Aug 2020 07:00  --------------------------------------------------------  IN: 1120 mL / OUT: 2910 mL / NET: -1790 mL                       8.5    2.59  )-----------( 106      ( 08 Aug 2020 07:06 )             25.5     08-08    138  |  105  |  31<H>  ----------------------------<  136<H>  4.8   |  22  |  1.05    Ca    9.3      08 Aug 2020 07:06  Phos  2.9     08-08  Mg     1.8     08-08    TPro  5.4<L>  /  Alb  3.1<L>  /  TBili  0.4  /  DBili  0.1  /  AST  24  /  ALT  25  /  AlkPhos  100  08-08    Review of symptoms:   Gen: no weight changes  Skin: no rashes  Head/Eyes/Ears/Mouth: no headache, normal hearing, no changes to vision  Respiratory: no dyspnea, cough  CV: No chest pain, PND, orthopnea  GI: stool incontinence  : no pain on urination  MSK: no joint swelling  Neuro: No dizziness  Heme: no easy bruising or bleeding   Endo: no heat/cold intolerance  Psych: no significant nervousness, anxiety, depression    Physical Exam  Constitutional: NAD/WD/WN  Eyes: Anicteric, PERRLA  ENMT: nc/at. no thrush or ulcerations appreciated  Neck: no JVD, supple  Respiratory: CTA B/L, unlabored breathing   Cardiovascular: RRR  Gastrointestinal: Soft abdomen, non tender to touch at surgical site, ND  Genitourinary: Voiding spontaneously  Extremities: SCD's in place and working bilaterally  Vascular: Palpable dp pulses bilaterally  Neurological: A&O x3, following commands. Very pleasant  Musculoskeletal: Moving all extremities  Psychiatric: Responsive TRANSPLANT SURGERY MULTIDISCIPLINARY NOTE    OLTx      Date:  7/2/2020       POD #38    Present:   Patient seen and examined with multidisciplinary team including Transplant Surgeon: Dr. Lucio, [facility] in am rounds with Dr. Lcuio. Disciplines not in attendance will be notified of the plan.    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     Re-admitted on 6/11:   ·	worsening HE  ·	UTI/VRE: tx with linezolid 6/15-22, bactrim DS 6/22 - 7/2  ·	MISA/Hyponatremia  ·	UGI bleed (melena) s/p EGD (6/22) c/w hemorrhagic duodenitis/gastritis; Grade 2 EV; requiring multiple transfusions  ·	Known h/o R foot ulcer (MRI neg for OM)  ·	s/p OLTx on 7/2/2020, post op liver doppler with patent vessels  ·	post op course c/b VRE bacteremia (7/10), tx with linezolid 7/10-7/23, dapto 7/23-7/26  ·	post op course c/b delirium likely in setting of CNI toxicity; CT head neg (7/31); off FK 7/19, off cylco 7/27. Everolimus initiated 7/27     Interval events:  - no overnight events; afebrile, vitals stable.   - Awake and alert this AM. Appropriate and conversant; seen ambulating w/ PT this AM.  - Stable liver function tests  - Tolerating PO intake, no N/V  - Incontinent with BM  - WBC still downtrending, 2.3 this AM from 2.5.   -Pulm following, sleep study tomorrow, will remain off of CPAP tonight.    Potential Discharge date: pending clinical improvement    Education:  Medications    Plan of care:  See Below      MEDICATIONS  (STANDING):  aspirin enteric coated 81 milliGRAM(s) Oral daily  chlorhexidine 2% Cloths 1 Application(s) Topical <User Schedule>  collagenase Ointment 1 Application(s) Topical daily  everolimus (ZORTRESS) 4 milliGRAM(s) Oral <User Schedule>  insulin glargine Injectable (LANTUS) 6 Unit(s) SubCutaneous at bedtime  insulin lispro (HumaLOG) corrective regimen sliding scale   SubCutaneous Before meals and at bedtime  insulin lispro Injectable (HumaLOG) 6 Unit(s) SubCutaneous three times a day before meals  magnesium oxide 800 milliGRAM(s) Oral two times a day with meals  magnesium sulfate  IVPB 1 Gram(s) IV Intermittent once  mycophenolate mofetil 750 milliGRAM(s) Oral <User Schedule>  nystatin    Suspension 231082 Unit(s) Oral four times a day  nystatin Cream 1 Application(s) Topical two times a day  pantoprazole    Tablet 40 milliGRAM(s) Oral before breakfast  predniSONE   Tablet 5 milliGRAM(s) Oral daily  tamsulosin 0.8 milliGRAM(s) Oral at bedtime  trimethoprim   80 mG/sulfamethoxazole 400 mG 1 Tablet(s) Oral daily  valGANciclovir 900 milliGRAM(s) Oral daily    MEDICATIONS  (PRN):  artificial  tears Solution 1 Drop(s) Both EYES two times a day PRN Dry Eyes  diphenhydrAMINE 25 milliGRAM(s) Oral <User Schedule> PRN Insomnia      PAST MEDICAL & SURGICAL HISTORY:  GIB (gastrointestinal bleeding)  GERD with esophagitis: Gastritis &amp; Non Bleeding Ulcers  Hepatic encephalopathy  Obesity  Fatty liver disease, nonalcoholic  Renal stones: 25 years ago  Hypertension  Neuropathy  Hypercholesteremia  Diabetes  S/P cholecystectomy      Vital Signs Last 24 Hrs  T(C): 36.6 (09 Aug 2020 09:00), Max: 36.8 (08 Aug 2020 21:00)  T(F): 97.9 (09 Aug 2020 09:00), Max: 98.3 (08 Aug 2020 21:00)  HR: 79 (09 Aug 2020 09:00) (79 - 90)  BP: 119/60 (09 Aug 2020 09:00) (115/60 - 135/69)  BP(mean): --  RR: 18 (09 Aug 2020 09:00) (18 - 18)  SpO2: 100% (09 Aug 2020 09:00) (97% - 100%)    I&O's Summary    08 Aug 2020 07:01  -  09 Aug 2020 07:00  --------------------------------------------------------  IN: 1180 mL / OUT: 1800 mL / NET: -620 mL    09 Aug 2020 07:01  -  09 Aug 2020 09:21  --------------------------------------------------------  IN: 0 mL / OUT: 250 mL / NET: -250 mL                              8.5    2.30  )-----------( 103      ( 09 Aug 2020 05:58 )             26.0     08-09    139  |  103  |  33<H>  ----------------------------<  200<H>  4.5   |  23  |  1.06    Ca    8.8      09 Aug 2020 05:58  Phos  3.1     08-09  Mg     1.9     08-09    TPro  5.7<L>  /  Alb  3.3  /  TBili  0.4  /  DBili  0.2  /  AST  16  /  ALT  26  /  AlkPhos  111  08-09    Review of symptoms:   Gen: no weight changes  Skin: no rashes  Head/Eyes/Ears/Mouth: no headache, normal hearing, no changes to vision  Respiratory: no dyspnea, cough  CV: No chest pain, PND, orthopnea  GI: stool incontinence  : no pain on urination  MSK: no joint swelling  Neuro: No dizziness  Heme: no easy bruising or bleeding   Endo: no heat/cold intolerance  Psych: no significant nervousness, anxiety, depression    Physical Exam  Constitutional: NAD/WD/WN  Eyes: Anicteric, PERRLA  ENMT: nc/at. no thrush or ulcerations appreciated  Neck: no JVD, supple  Respiratory: CTA B/L, unlabored breathing   Cardiovascular: RRR  Gastrointestinal: Soft abdomen, non tender to touch at surgical site, ND  Genitourinary: Voiding spontaneously  Extremities: SCD's in place and working bilaterally  Vascular: Palpable dp pulses bilaterally  Neurological: A&O x3, following commands. Very pleasant  Musculoskeletal: Moving all extremities  Psychiatric: Responsive

## 2020-08-09 NOTE — PROGRESS NOTE ADULT - ATTENDING COMMENTS
Patient seen and discussed with the fellow.    Pending sleep study. AAO x 3. Almost ready for discharge.

## 2020-08-09 NOTE — PROGRESS NOTE ADULT - SUBJECTIVE AND OBJECTIVE BOX
Chief Complaint:  Patient is a 64y old  Male who presents with a chief complaint of Liver failure, hepatic encephalopathy (09 Aug 2020 13:54)      Interval Events:     No acute complaints. States he will have inpatient sleep study tomorrow.     Allergies:  codeine (Anaphylaxis)      Hospital Medications:  artificial  tears Solution 1 Drop(s) Both EYES two times a day PRN  aspirin enteric coated 81 milliGRAM(s) Oral daily  chlorhexidine 2% Cloths 1 Application(s) Topical <User Schedule>  collagenase Ointment 1 Application(s) Topical daily  diphenhydrAMINE 25 milliGRAM(s) Oral <User Schedule> PRN  everolimus (ZORTRESS) 4.5 milliGRAM(s) Oral <User Schedule>  insulin glargine Injectable (LANTUS) 6 Unit(s) SubCutaneous at bedtime  insulin lispro (HumaLOG) corrective regimen sliding scale   SubCutaneous Before meals and at bedtime  insulin lispro Injectable (HumaLOG) 6 Unit(s) SubCutaneous three times a day before meals  magnesium oxide 800 milliGRAM(s) Oral two times a day with meals  mycophenolate mofetil 500 milliGRAM(s) Oral two times a day  nystatin    Suspension 214377 Unit(s) Oral four times a day  nystatin Cream 1 Application(s) Topical two times a day  pantoprazole    Tablet 40 milliGRAM(s) Oral before breakfast  predniSONE   Tablet 5 milliGRAM(s) Oral daily  tamsulosin 0.8 milliGRAM(s) Oral at bedtime  trimethoprim   80 mG/sulfamethoxazole 400 mG 1 Tablet(s) Oral daily  valGANciclovir 900 milliGRAM(s) Oral daily      PMHX/PSHX:  GIB (gastrointestinal bleeding)  GERD with esophagitis  Hepatic encephalopathy  Obesity  Fatty liver disease, nonalcoholic  Renal stones  Hypertension  Neuropathy  Hypercholesteremia  Diabetes  S/P cholecystectomy  No significant past surgical history      Family history:  Family history of type 2 diabetes mellitus  Family history of hypertension  Family history of stomach cancer  No pertinent family history in first degree relatives      ROS:     General:  No weight loss, fevers, chills, night sweats, fatigue   Eyes:  No vision changes  ENT:  No sore throat, pain, runny nose  CV:  No chest pain, palpitations, dizziness   Resp:  No SOB, cough, wheezing  GI:  See HPI  :  No burning with urination, hematuria  Muscle:  No pain, weakness  Neuro:  No weakness/tingling, memory problems  Psych:  No fatigue, insomnia, mood problems, depression  Heme:  No easy bruisability  Skin:  No rash, edema      PHYSICAL EXAM:     GENERAL: no acute distress  NEURO: alert, no asterixis  HEENT: anicteric sclera, no conjunctival pallor appreciated  CHEST: no respiratory distress, no accessory muscle use  CARDIAC: regular rate, rhythm  ABDOMEN: soft, non-tender, non-distended, no rebound or guarding  EXTREMITIES: warm, well perfused, no edema      Vital Signs:  Vital Signs Last 24 Hrs  T(C): 36.5 (09 Aug 2020 13:00), Max: 36.8 (08 Aug 2020 21:00)  T(F): 97.7 (09 Aug 2020 13:), Max: 98.3 (08 Aug 2020 21:00)  HR: 88 (09 Aug 2020 13:) (79 - 90)  BP: 156/72 (09 Aug 2020 13:) (115/60 - 156/72)  BP(mean): 104 (09 Aug 2020 13:) (104 - 104)  RR: 18 (09 Aug 2020 13:) (18 - 18)  SpO2: 100% (09 Aug 2020 13:) (100% - 100%)  Daily     Daily Weight in k.5 (09 Aug 2020 05:00)    LABS:                        8.5    2.30  )-----------( 103      ( 09 Aug 2020 05:58 )             26.0     08-09    139  |  103  |  33<H>  ----------------------------<  200<H>  4.5   |  23  |  1.06    Ca    8.8      09 Aug 2020 05:58  Phos  3.1     08-  Mg     1.9     08-09    TPro  5.7<L>  /  Alb  3.3  /  TBili  0.4  /  DBili  0.2  /  AST  16  /  ALT  26  /  AlkPhos  111  08-09    LIVER FUNCTIONS - ( 09 Aug 2020 05:58 )  Alb: 3.3 g/dL / Pro: 5.7 g/dL / ALK PHOS: 111 U/L / ALT: 26 U/L / AST: 16 U/L / GGT: x           PT/INR - ( 09 Aug 2020 05:58 )   PT: 12.1 sec;   INR: 1.02 ratio         PTT - ( 09 Aug 2020 05:59 )  PTT:28.1 sec        Imaging:

## 2020-08-09 NOTE — PROGRESS NOTE ADULT - ASSESSMENT
64 M hx of DM, HLD, GERD, HFpEF with mild LV diastolic dysfunction, and decompensated JEAN cirrhosis c/b ascites with hx of SBP, HE, duodenal ulcer, GAVE and duodenal AVM s/p APC (last on 10/11/19), s/p liver transplant this admission (7/2/20). Post-op course c/b tacrolimus toxicity and hospital delerium, improving after stopping CNI and replacing w/ everolimus.     Impression:    #S/p liver transplant 7/2: liver enzymes normalized.   OR details:  - L groin cutdown for vv bypass   - anastomosis: bicaval end-end/CBD duct-duct/HA & PV end-end, all standard anatomy.    - IOC with good flow  - Simulect induction. 500mg Solumedrol  - JPs x3 with T-Tube  - 14U pRBC, 14U FFP, 10 PLT, 11 CRYO, 500mL returned from cell saver, EBL 5L, UOP 1100mL  Recipient Info:   ABO: A  CMV:  Neg  EBV Positive  Donor:  Donor ID:  [id number] Match: [id number]  Age: 29 ABO:  A1 High Risk:   No COD: Cardiovascular  Anti CMV positive, EBV IgG- positive  HepBcAb-neg, Hepatitis C-DANA- neg, Hepatitis C ab-neg    #Halluciniations/agitation/AMS/tremors – due to CNI toxicity, improving now that CNI discontinued and replaced with everolimus  #Sacral decubitus ulcer: stage I, does not appear infected  #R posterior heel wound w/ overlying eschar –Local wound care. no signs of infections, XR neg for gas, evaluated by podiatry and ID. MRI w/o contrast limited, but no overt signs of active infection.  #GI bleed: Resolved. Hgb stable with no overt bleeding post-transplant. 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 GAVE and PHG likely improved pos ttransplant.  #ESBL UTI with h/o prostatic abscess: Resolved with IV abx  #VRE bacteremia: Resolved with Linezolid and meropenem     Recommendation:  - continue with everolimus dosed per Transplant surgery, f/u daily levels (currently 4.5mg BID)  - continue Cellcept 500 mg BID as per Transplant surgery  - continue prednisone 5 mg PO daily  - continue w/ PPI daily for GI prophylaxis   - frequent reorientation and visitation, normalized sleep/wake cycle environment to mitigate hospital delerium    - continue nystatin, Bactrim and Valcyte ppx  - physical therapy daily/BID  - continue with mag oxide 200 mg BID  - c/w ASA 81mg daily  - continue to hold diuretics  - inpatient sleep study as per Transplant surgery   - outpatient follow up with Dr. Medina within 1-2 weeks post discharge (we will arrange this appointment)      Vivian Guardado PGY-5  Gastroenterology Fellow  Pager #[phone]  E-mail [email or web address]  Call [phone] to speak to answering service for on-call GI fellow 5pm-7am and weekends.

## 2020-08-09 NOTE — PROGRESS NOTE ADULT - ASSESSMENT
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 c/b VRE bactermia and delirium    [] POD 36 s/p OLT   - Good graft function, LFTs stable   - Immuno:  Everolimus 4mg bid, MMF 750mg bid, Pred 5     -->Tacro DC'd 7/19. Cyclosporine DC'd 7/27.  Changed to Everolimus on 7/27 due to persistent delirium/AMS, now with improvement     -->Daily Everolimus leve  - PPX: Valcyte/Nystatin/Bactrim  - Mental status improving slowly.  CT Head negative  - Diet: Regular, tolerating, PO intake improving  - Continue ASA 81mg daily  - h/o UGI bleed: Protonix 40mg daily  - DVT PPx: SCDs at all times  - Sacral decub: seen by wound care, recs appreciated. Healing well.   - Pain control PRN  - keep Mag >2  - Uses CPAP overnight; was observed off CPAP 8/6 night, was noted to have several episodes of desaturation. Seen by pulm 8/7, planned for sleep study  8/10. Will need to keep CPAP off starting Sun night in preparation for the study.    [] VRE bacteremia (7/10)  - tx with linezolid 7/10-7/23,  Daptomycin 7/23-7/26   - BCx from 7/20 with NG, UCx (7/20) with NG     [] DM  - Appreciate Endocrinology recommendations  - Continue lantus 5u qhs and ISS  - Resume premeal insulin 2 U Humalog  - Fingersticks ac and qhs    [] Heel ulcer  - Healing well. Keep open to air.   - Appreciate podiatry recommendations    [] Dispo:    - continue 1:1 overnight. DC 1:1 during day.   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 c/b VRE bactermia and delirium    [] POD 36 s/p OLT   - Good graft function, LFTs stable   - Immuno:  Everolimus 4mg bid, MMF 750mg bid, Pred 5     -->Tacro DC'd 7/19. Cyclosporine DC'd 7/27.  Changed to Everolimus on 7/27 due to persistent delirium/AMS, now with improvement     -->Daily Everolimus leve  - PPX: Valcyte/Nystatin/Bactrim  - Mental status improving slowly.  CT Head negative  - Diet: Regular, tolerating, PO intake improving  - Continue ASA 81mg daily  - h/o UGI bleed: Protonix 40mg daily  - DVT PPx: SCDs at all times  - Sacral decub: seen by wound care, recs appreciated. Healing well.   - Pain control PRN  - keep Mag >2  - Uses CPAP overnight; was observed off CPAP 8/6 night, was noted to have several episodes of desaturation. Seen by pulm 8/7, planned for sleep study  8/10. Will need to keep CPAP off tonight in preparation for the study.    [] VRE bacteremia (7/10)  - tx with linezolid 7/10-7/23,  Daptomycin 7/23-7/26   - BCx from 7/20 with NG, UCx (7/20) with NG     [] DM  - Appreciate Endocrinology recommendations  - Continue lantus 6u qhs and ISS  - Resume premeal insulin 6 U Humalog  - Fingersticks ac and qhs    [] Heel ulcer  - Healing well.   - Appreciate podiatry recommendations    [] Dispo:    - continue 1:1 overnight (from 11 PM and 7AM).   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 c/b VRE bactermia and delirium    [] POD 36 s/p OLT   - Good graft function, LFTs stable   - Immuno:  Everolimus 4mg bid, Pred 5, decrease MMF to 500 BID give leukopenia     -->Tacro DC'd 7/19. Cyclosporine DC'd 7/27.  Changed to Everolimus on 7/27 due to persistent delirium/AMS, now with improvement     -->Daily Everolimus leve  - PPX: Valcyte/Nystatin/Bactrim  - Mental status improving slowly.  CT Head negative  - Diet: Regular, tolerating, PO intake improving  - Continue ASA 81mg daily  - h/o UGI bleed: Protonix 40mg daily  - DVT PPx: SCDs at all times  - Sacral decub: seen by wound care, recs appreciated. Healing well.   - Pain control PRN  - keep Mag >2  - Uses CPAP overnight; was observed off CPAP 8/6 night, was noted to have several episodes of desaturation. Seen by pulm 8/7, sleep study planned for tomorrow. Will need to keep CPAP off tonight in preparation for the study tomorrow.     [] VRE bacteremia (7/10)  - tx with linezolid 7/10-7/23,  Daptomycin 7/23-7/26   - BCx from 7/20 with NG, UCx (7/20) with NG     [] DM  - Appreciate Endocrinology recommendations  - Continue lantus 6u qhs and ISS  - Resume premeal insulin 6 U Humalog  - Fingersticks ac and qhs    [] Heel ulcer  - Healing well.   - Appreciate podiatry recommendations    [] Dispo:    - continue 1:1 overnight (from 11 PM and 7AM).   - expect d/c home with home PT, potentially this week  - daily PT/OT (team aware)  - discharge planning in progress w/ Liver transplant

## 2020-08-09 NOTE — PROGRESS NOTE ADULT - SUBJECTIVE AND OBJECTIVE BOX
INTERVAL HPI/OVERNIGHT EVENTS:  No new overnight event.  No N/V/D.  Tolerating diet.  less confused    Allergies    codeine (Anaphylaxis)    Intolerances    General:  No wt loss, fevers, chills, night sweats, fatigue,   Eyes:  Good vision, no reported pain  ENT:  No sore throat, pain, runny nose, dysphagia  CV:  No pain, palpitations, hypo/hypertension  Resp:  No dyspnea, cough, tachypnea, wheezing  GI:  No pain, No nausea, No vomiting, No diarrhea, No constipation, No weight loss, No fever, No pruritis, No rectal bleeding, No tarry stools, No dysphagia,  :  No pain, bleeding, incontinence, nocturia  Muscle:  No pain, weakness  Neuro:  No weakness, tingling, memory problems  Psych:  No fatigue, insomnia, mood problems, depression  Endocrine:  No polyuria, polydipsia, cold/heat intolerance  Heme:  No petechiae, ecchymosis, easy bruisability  Skin:  No rash, tattoos, scars, edema      PHYSICAL EXAM:   Vital Signs:  Vital Signs Last 24 Hrs  T(C): 36.5 (09 Aug 2020 13:00), Max: 36.8 (08 Aug 2020 21:00)  T(F): 97.7 (09 Aug 2020 13:00), Max: 98.3 (08 Aug 2020 21:00)  HR: 88 (09 Aug 2020 13:) (79 - 90)  BP: 156/72 (09 Aug 2020 13:00) (115/60 - 156/72)  BP(mean): 104 (09 Aug 2020 13:) (104 - 104)  RR: 18 (09 Aug 2020 13:) (18 - 18)  SpO2: 100% (09 Aug 2020 13:00) (100% - 100%)  Daily     Daily Weight in k.5 (09 Aug 2020 05:00)I&O's Summary    08 Aug 2020 07:01  -  09 Aug 2020 07:00  --------------------------------------------------------  IN: 1180 mL / OUT: 1800 mL / NET: -620 mL    09 Aug 2020 07:01  -  09 Aug 2020 13:54  --------------------------------------------------------  IN: 580 mL / OUT: 550 mL / NET: 30 mL        GENERAL:  Appears stated age, well-groomed, well-nourished, no distress  HEENT:  NC/AT,  conjunctivae clear and pink, no thyromegaly, nodules, adenopathy, no JVD, sclera -anicteric  CHEST:  Full & symmetric excursion, no increased effort, breath sounds clear  HEART:  Regular rhythm, S1, S2, no murmur/rub/S3/S4, no abdominal bruit, no edema  ABDOMEN:  Soft, non-tender, non-distended, normoactive bowel sounds,  no masses ,no hepato-splenomegaly, no signs of chronic liver disease  EXTEREMITIES:  no cyanosis,clubbing or edema  SKIN:  No rash/erythema/ecchymoses/petechiae/wounds/abscess/warm/dry  NEURO:  Alert, oriented, no asterixis, no tremor, no encephalopathy      LABS:                        8.5    2.30  )-----------( 103      ( 09 Aug 2020 05:58 )             26.0     08-09    139  |  103  |  33<H>  ----------------------------<  200<H>  4.5   |  23  |  1.06    Ca    8.8      09 Aug 2020 05:58  Phos  3.1     08-  Mg     1.9     08-    TPro  5.7<L>  /  Alb  3.3  /  TBili  0.4  /  DBili  0.2  /  AST  16  /  ALT  26  /  AlkPhos  111  08-09    PT/INR - ( 09 Aug 2020 05:58 )   PT: 12.1 sec;   INR: 1.02 ratio         PTT - ( 09 Aug 2020 05:59 )  PTT:28.1 sec    amylase   lipase  RADIOLOGY & ADDITIONAL TESTS:

## 2020-08-09 NOTE — PROGRESS NOTE ADULT - ATTENDING COMMENTS
agree with above  making good progress  good graft function  immunosuppression everolimus, mmf and steroids  awaiting sleep apnea study

## 2020-08-10 LAB
ALBUMIN SERPL ELPH-MCNC: 3.2 G/DL — LOW (ref 3.3–5)
ALP SERPL-CCNC: 112 U/L — SIGNIFICANT CHANGE UP (ref 40–120)
ALT FLD-CCNC: 25 U/L — SIGNIFICANT CHANGE UP (ref 10–45)
ANION GAP SERPL CALC-SCNC: 11 MMOL/L — SIGNIFICANT CHANGE UP (ref 5–17)
APTT BLD: 29.6 SEC — SIGNIFICANT CHANGE UP (ref 27.5–35.5)
AST SERPL-CCNC: 13 U/L — SIGNIFICANT CHANGE UP (ref 10–40)
BASOPHILS # BLD AUTO: 0.04 K/UL — SIGNIFICANT CHANGE UP (ref 0–0.2)
BASOPHILS NFR BLD AUTO: 1.9 % — SIGNIFICANT CHANGE UP (ref 0–2)
BILIRUB DIRECT SERPL-MCNC: 0.2 MG/DL — SIGNIFICANT CHANGE UP (ref 0–0.2)
BILIRUB INDIRECT FLD-MCNC: 0.2 MG/DL — SIGNIFICANT CHANGE UP (ref 0.2–1)
BILIRUB SERPL-MCNC: 0.4 MG/DL — SIGNIFICANT CHANGE UP (ref 0.2–1.2)
BUN SERPL-MCNC: 32 MG/DL — HIGH (ref 7–23)
CALCIUM SERPL-MCNC: 9.1 MG/DL — SIGNIFICANT CHANGE UP (ref 8.4–10.5)
CHLORIDE SERPL-SCNC: 104 MMOL/L — SIGNIFICANT CHANGE UP (ref 96–108)
CO2 SERPL-SCNC: 24 MMOL/L — SIGNIFICANT CHANGE UP (ref 22–31)
CREAT SERPL-MCNC: 1.09 MG/DL — SIGNIFICANT CHANGE UP (ref 0.5–1.3)
EOSINOPHIL # BLD AUTO: 0.12 K/UL — SIGNIFICANT CHANGE UP (ref 0–0.5)
EOSINOPHIL NFR BLD AUTO: 5.8 % — SIGNIFICANT CHANGE UP (ref 0–6)
GLUCOSE BLDC GLUCOMTR-MCNC: 163 MG/DL — HIGH (ref 70–99)
GLUCOSE BLDC GLUCOMTR-MCNC: 173 MG/DL — HIGH (ref 70–99)
GLUCOSE BLDC GLUCOMTR-MCNC: 205 MG/DL — HIGH (ref 70–99)
GLUCOSE BLDC GLUCOMTR-MCNC: 255 MG/DL — HIGH (ref 70–99)
GLUCOSE SERPL-MCNC: 242 MG/DL — HIGH (ref 70–99)
HCT VFR BLD CALC: 24.1 % — LOW (ref 39–50)
HGB BLD-MCNC: 8 G/DL — LOW (ref 13–17)
IMM GRANULOCYTES NFR BLD AUTO: 0.5 % — SIGNIFICANT CHANGE UP (ref 0–1.5)
INR BLD: 1.05 RATIO — SIGNIFICANT CHANGE UP (ref 0.88–1.16)
LYMPHOCYTES # BLD AUTO: 0.32 K/UL — LOW (ref 1–3.3)
LYMPHOCYTES # BLD AUTO: 15.5 % — SIGNIFICANT CHANGE UP (ref 13–44)
MAGNESIUM SERPL-MCNC: 2 MG/DL — SIGNIFICANT CHANGE UP (ref 1.6–2.6)
MCHC RBC-ENTMCNC: 32.1 PG — SIGNIFICANT CHANGE UP (ref 27–34)
MCHC RBC-ENTMCNC: 33.2 GM/DL — SIGNIFICANT CHANGE UP (ref 32–36)
MCV RBC AUTO: 96.8 FL — SIGNIFICANT CHANGE UP (ref 80–100)
MONOCYTES # BLD AUTO: 0.17 K/UL — SIGNIFICANT CHANGE UP (ref 0–0.9)
MONOCYTES NFR BLD AUTO: 8.3 % — SIGNIFICANT CHANGE UP (ref 2–14)
NEUTROPHILS # BLD AUTO: 1.4 K/UL — LOW (ref 1.8–7.4)
NEUTROPHILS NFR BLD AUTO: 68 % — SIGNIFICANT CHANGE UP (ref 43–77)
NRBC # BLD: 0 /100 WBCS — SIGNIFICANT CHANGE UP (ref 0–0)
PHOSPHATE SERPL-MCNC: 3 MG/DL — SIGNIFICANT CHANGE UP (ref 2.5–4.5)
PLATELET # BLD AUTO: 101 K/UL — LOW (ref 150–400)
POTASSIUM SERPL-MCNC: 4.6 MMOL/L — SIGNIFICANT CHANGE UP (ref 3.5–5.3)
POTASSIUM SERPL-SCNC: 4.6 MMOL/L — SIGNIFICANT CHANGE UP (ref 3.5–5.3)
PROCALCITONIN SERPL-MCNC: 0.19 NG/ML — HIGH (ref 0.02–0.1)
PROT SERPL-MCNC: 5.3 G/DL — LOW (ref 6–8.3)
PROTHROM AB SERPL-ACNC: 12.5 SEC — SIGNIFICANT CHANGE UP (ref 10.6–13.6)
RBC # BLD: 2.49 M/UL — LOW (ref 4.2–5.8)
RBC # FLD: 16.7 % — HIGH (ref 10.3–14.5)
SODIUM SERPL-SCNC: 139 MMOL/L — SIGNIFICANT CHANGE UP (ref 135–145)
WBC # BLD: 2.26 K/UL — LOW (ref 3.8–10.5)
WBC # FLD AUTO: 2.26 K/UL — LOW (ref 3.8–10.5)

## 2020-08-10 PROCEDURE — 99233 SBSQ HOSP IP/OBS HIGH 50: CPT | Mod: GC

## 2020-08-10 PROCEDURE — 99232 SBSQ HOSP IP/OBS MODERATE 35: CPT | Mod: GC,24

## 2020-08-10 PROCEDURE — 99232 SBSQ HOSP IP/OBS MODERATE 35: CPT

## 2020-08-10 PROCEDURE — 99232 SBSQ HOSP IP/OBS MODERATE 35: CPT | Mod: GC

## 2020-08-10 RX ADMIN — EVEROLIMUS 4.5 MILLIGRAM(S): 10 TABLET ORAL at 07:59

## 2020-08-10 RX ADMIN — Medication 500000 UNIT(S): at 05:59

## 2020-08-10 RX ADMIN — MYCOPHENOLATE MOFETIL 500 MILLIGRAM(S): 250 CAPSULE ORAL at 07:58

## 2020-08-10 RX ADMIN — VALGANCICLOVIR 900 MILLIGRAM(S): 450 TABLET, FILM COATED ORAL at 11:58

## 2020-08-10 RX ADMIN — PANTOPRAZOLE SODIUM 40 MILLIGRAM(S): 20 TABLET, DELAYED RELEASE ORAL at 07:58

## 2020-08-10 RX ADMIN — EVEROLIMUS 4.5 MILLIGRAM(S): 10 TABLET ORAL at 20:05

## 2020-08-10 RX ADMIN — Medication 1 APPLICATION(S): at 11:58

## 2020-08-10 RX ADMIN — Medication 81 MILLIGRAM(S): at 11:58

## 2020-08-10 RX ADMIN — Medication 6 UNIT(S): at 08:14

## 2020-08-10 RX ADMIN — TAMSULOSIN HYDROCHLORIDE 0.8 MILLIGRAM(S): 0.4 CAPSULE ORAL at 21:22

## 2020-08-10 RX ADMIN — NYSTATIN CREAM 1 APPLICATION(S): 100000 CREAM TOPICAL at 17:11

## 2020-08-10 RX ADMIN — Medication 6 UNIT(S): at 11:57

## 2020-08-10 RX ADMIN — Medication 1 TABLET(S): at 11:58

## 2020-08-10 RX ADMIN — Medication 500000 UNIT(S): at 11:56

## 2020-08-10 RX ADMIN — Medication 5 MILLIGRAM(S): at 05:59

## 2020-08-10 RX ADMIN — NYSTATIN CREAM 1 APPLICATION(S): 100000 CREAM TOPICAL at 06:01

## 2020-08-10 RX ADMIN — Medication 500000 UNIT(S): at 17:10

## 2020-08-10 RX ADMIN — Medication 500000 UNIT(S): at 21:22

## 2020-08-10 RX ADMIN — Medication 2: at 08:14

## 2020-08-10 RX ADMIN — INSULIN GLARGINE 6 UNIT(S): 100 INJECTION, SOLUTION SUBCUTANEOUS at 21:22

## 2020-08-10 RX ADMIN — Medication 1: at 17:11

## 2020-08-10 RX ADMIN — MAGNESIUM OXIDE 400 MG ORAL TABLET 800 MILLIGRAM(S): 241.3 TABLET ORAL at 17:11

## 2020-08-10 RX ADMIN — MAGNESIUM OXIDE 400 MG ORAL TABLET 800 MILLIGRAM(S): 241.3 TABLET ORAL at 07:59

## 2020-08-10 RX ADMIN — Medication 6 UNIT(S): at 17:11

## 2020-08-10 RX ADMIN — Medication 3: at 21:22

## 2020-08-10 RX ADMIN — MYCOPHENOLATE MOFETIL 500 MILLIGRAM(S): 250 CAPSULE ORAL at 20:04

## 2020-08-10 RX ADMIN — Medication 1: at 11:57

## 2020-08-10 NOTE — PROGRESS NOTE ADULT - SUBJECTIVE AND OBJECTIVE BOX
Follow Up:      Interval History:    REVIEW OF SYSTEMS  [  ] ROS unobtainable because:    [  ] All other systems negative except as noted below    Constitutional:  [ ] fever [ ] chills  [ ] weight loss  [ ] weakness  Skin:  [ ] rash [ ] phlebitis	  Eyes: [ ] icterus [ ] pain  [ ] discharge	  ENMT: [ ] sore throat  [ ] thrush [ ] ulcers [ ] exudates  Respiratory: [ ] dyspnea [ ] hemoptysis [ ] cough [ ] sputum	  Cardiovascular:  [ ] chest pain [ ] palpitations [ ] edema	  Gastrointestinal:  [ ] nausea [ ] vomiting [ ] diarrhea [ ] constipation [ ] pain	  Genitourinary:  [ ] dysuria [ ] frequency [ ] hematuria [ ] discharge [ ] flank pain  [ ] incontinence  Musculoskeletal:  [ ] myalgias [ ] arthralgias [ ] arthritis  [ ] back pain  Neurological:  [ ] headache [ ] seizures  [ ] confusion/altered mental status    Allergies  codeine (Anaphylaxis)        ANTIMICROBIALS:  nystatin    Suspension 161697 four times a day  trimethoprim   80 mG/sulfamethoxazole 400 mG 1 daily  valGANciclovir 900 daily      OTHER MEDS:  MEDICATIONS  (STANDING):  aspirin enteric coated 81 daily  diphenhydrAMINE 25 <User Schedule> PRN  everolimus (ZORTRESS) 4.5 <User Schedule>  insulin glargine Injectable (LANTUS) 6 at bedtime  insulin lispro (HumaLOG) corrective regimen sliding scale  Before meals and at bedtime  insulin lispro Injectable (HumaLOG) 6 three times a day before meals  mycophenolate mofetil 500 two times a day  pantoprazole    Tablet 40 before breakfast  predniSONE   Tablet 5 daily  tamsulosin 0.8 at bedtime      Vital Signs Last 24 Hrs  T(C): 37 (10 Aug 2020 13:57), Max: 37.4 (10 Aug 2020 00:33)  T(F): 98.6 (10 Aug 2020 13:57), Max: 99.4 (10 Aug 2020 00:33)  HR: 84 (10 Aug 2020 13:57) (66 - 94)  BP: 122/65 (10 Aug 2020 13:57) (101/66 - 157/95)  BP(mean): 82 (10 Aug 2020 06:50) (82 - 82)  RR: 18 (10 Aug 2020 13:57) (18 - 18)  SpO2: 99% (10 Aug 2020 13:57) (95% - 100%)    PHYSICAL EXAMINATION:  General: Alert and Awake, NAD  HEENT: PERRL, EOMI  Neck: Supple  Cardiac: RRR, No M/R/G  Resp: CTAB, No Wh/Rh/Ra  Abdomen: NBS, NT/ND, No HSM, No rigidity or guarding  MSK: No LE edema. No Calf tenderness  : No bob  Skin: No rashes or lesions. Skin is warm and dry to the touch.   Neuro: Alert and Awake. CN 2-12 Grossly intact. Moves all four extremities spontaneously.  Psych: Calm, Pleasant, Cooperative                          8.0    2.26  )-----------( 101      ( 10 Aug 2020 06:04 )             24.1       08-10    139  |  104  |  32<H>  ----------------------------<  242<H>  4.6   |  24  |  1.09    Ca    9.1      10 Aug 2020 06:04  Phos  3.0     08-10  Mg     2.0     08-10    TPro  5.3<L>  /  Alb  3.2<L>  /  TBili  0.4  /  DBili  0.2  /  AST  13  /  ALT  25  /  AlkPhos  112  08-10          MICROBIOLOGY:  v  .Blood Blood  07-20-20   No Growth Final  --  --      .Urine Clean Catch (Midstream)  07-20-20   No growth  --  --      .Body Fluid Abdominal Fluid  07-14-20   No growth at 5 days  --    polymorphonuclear leukocytes seen  No organisms seen  by cytocentrifuge      .Blood Blood-Peripheral  07-12-20   No Growth Final  --  --        CMV IgG Antibody: <0.20 U/mL (12-04-19 @ 08:33)  Toxoplasma IgG Screen: <3.0 IU/mL (12-04-19 @ 08:33)          RADIOLOGY:    <The imaging below has been reviewed and visualized by me independently. Findings as detailed in report below> Follow Up:  s/p olt, encephalopathy    Interval History: afebrile. denies pain.     REVIEW OF SYSTEMS  [  ] ROS unobtainable because:    [ x ] All other systems negative except as noted below    Constitutional:  [ ] fever [ ] chills  [ ] weight loss  [ ] weakness  Skin:  [ ] rash [ ] phlebitis	  Eyes: [ ] icterus [ ] pain  [ ] discharge	  ENMT: [ ] sore throat  [ ] thrush [ ] ulcers [ ] exudates  Respiratory: [ ] dyspnea [ ] hemoptysis [ ] cough [ ] sputum	  Cardiovascular:  [ ] chest pain [ ] palpitations [ ] edema	  Gastrointestinal:  [ ] nausea [ ] vomiting [ ] diarrhea [ ] constipation [ ] pain	  Genitourinary:  [ ] dysuria [ ] frequency [ ] hematuria [ ] discharge [ ] flank pain  [ ] incontinence  Musculoskeletal:  [ ] myalgias [ ] arthralgias [ ] arthritis  [ ] back pain  Neurological:  [ ] headache [ ] seizures  [ ] confusion/altered mental status    Allergies  codeine (Anaphylaxis)        ANTIMICROBIALS:  nystatin    Suspension [id number] four times a day  trimethoprim   80 mG/sulfamethoxazole 400 mG 1 daily  valGANciclovir 900 daily      OTHER MEDS:  MEDICATIONS  (STANDING):  aspirin enteric coated 81 daily  diphenhydrAMINE 25 <User Schedule> PRN  everolimus (ZORTRESS) 4.5 <User Schedule>  insulin glargine Injectable (LANTUS) 6 at bedtime  insulin lispro (HumaLOG) corrective regimen sliding scale  Before meals and at bedtime  insulin lispro Injectable (HumaLOG) 6 three times a day before meals  mycophenolate mofetil 500 two times a day  pantoprazole    Tablet 40 before breakfast  predniSONE   Tablet 5 daily  tamsulosin 0.8 at bedtime      Vital Signs Last 24 Hrs  T(C): 37 (10 Aug 2020 13:57), Max: 37.4 (10 Aug 2020 00:33)  T(F): 98.6 (10 Aug 2020 13:57), Max: 99.4 (10 Aug 2020 00:33)  HR: 84 (10 Aug 2020 13:57) (66 - 94)  BP: 122/65 (10 Aug 2020 13:57) (101/66 - 157/95)  BP(mean): 82 (10 Aug 2020 06:50) (82 - 82)  RR: 18 (10 Aug 2020 13:57) (18 - 18)  SpO2: 99% (10 Aug 2020 13:57) (95% - 100%)    PHYSICAL EXAMINATION:  General: Alert and Awake, NAD  HEENT: PERRL, EOMI  Neck: Supple  Cardiac: RRR, No M/R/G  Resp: CTAB, No Wh/Rh/Ra  Abdomen: Surgical site from OLT is clean, without erythema and without drainage. NBS, NT/ND, No HSM, No rigidity or guarding  MSK: No LE edema. No Calf tenderness  : No bob  Skin: No rashes or lesions. Skin is warm and dry to the touch.   Neuro: Alert and Awake. CN 2-12 Grossly intact. Moves all four extremities spontaneously.  Psych: Calm, Pleasant, Cooperative                          8.0    2.26  )-----------( 101      ( 10 Aug 2020 06:04 )             24.1       08-10    139  |  104  |  32<H>  ----------------------------<  242<H>  4.6   |  24  |  1.09    Ca    9.1      10 Aug 2020 06:04  Phos  3.0     08-10  Mg     2.0     08-10    TPro  5.3<L>  /  Alb  3.2<L>  /  TBili  0.4  /  DBili  0.2  /  AST  13  /  ALT  25  /  AlkPhos  112  08-10          MICROBIOLOGY:  v  .Blood Blood  07-20-20   No Growth Final  --  --      .Urine Clean Catch (Midstream)  07-20-20   No growth  --  --      .Body Fluid Abdominal Fluid  07-14-20   No growth at 5 days  --    polymorphonuclear leukocytes seen  No organisms seen  by cytocentrifuge      .Blood Blood-Peripheral  07-12-20   No Growth Final  --  --        CMV IgG Antibody: <0.20 U/mL (12-04-19 @ 08:33)  Toxoplasma IgG Screen: <3.0 IU/mL (12-04-19 @ 08:33)          RADIOLOGY:    <The imaging below has been reviewed and visualized by me independently. Findings as detailed in report below>    EXAM:  CT BRAIN                        PROCEDURE DATE:  07/31/2020    Slight progression to atrophic changes compared with 6/11/2020 which may be the result of treatment-related changes. No other significant pathology

## 2020-08-10 NOTE — PROGRESS NOTE ADULT - SUBJECTIVE AND OBJECTIVE BOX
TRANSPLANT SURGERY MULTIDISCIPLINARY NOTE    OLTx      Date:  7/2/2020       POD #38    Present:   Patient seen and examined with multidisciplinary team including Transplant Surgeon: Dr. Lucio, [facility] in am rounds with Dr. Lucio. Disciplines not in attendance will be notified of the plan.    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     Re-admitted on 6/11:   ·	worsening HE  ·	UTI/VRE: tx with linezolid 6/15-22, bactrim DS 6/22 - 7/2  ·	MISA/Hyponatremia  ·	UGI bleed (melena) s/p EGD (6/22) c/w hemorrhagic duodenitis/gastritis; Grade 2 EV; requiring multiple transfusions  ·	Known h/o R foot ulcer (MRI neg for OM)  ·	s/p OLTx on 7/2/2020, post op liver doppler with patent vessels  ·	post op course c/b VRE bacteremia (7/10), tx with linezolid 7/10-7/23, dapto 7/23-7/26  ·	post op course c/b delirium likely in setting of CNI toxicity; CT head neg (7/31); off FK 7/19, off cylco 7/27. Everolimus initiated 7/27     Interval events:  - no overnight events; afebrile, vitals stable.   - Awake and alert this AM. Appropriate and conversant  - Stable liver function tests  - Tolerating PO intake, no N/V  - Incontinent with BM  - WBC still downtrending, 2.3 this AM from 2.5.   -Pulm following, sleep study tomorrow, will remain off of CPAP tonight.    Potential Discharge date: pending clinical improvement    Education:  Medications    Plan of care:  See Below      MEDICATIONS  (STANDING):  aspirin enteric coated 81 milliGRAM(s) Oral daily  chlorhexidine 2% Cloths 1 Application(s) Topical <User Schedule>  collagenase Ointment 1 Application(s) Topical daily  everolimus (ZORTRESS) 4 milliGRAM(s) Oral <User Schedule>  insulin glargine Injectable (LANTUS) 6 Unit(s) SubCutaneous at bedtime  insulin lispro (HumaLOG) corrective regimen sliding scale   SubCutaneous Before meals and at bedtime  insulin lispro Injectable (HumaLOG) 6 Unit(s) SubCutaneous three times a day before meals  magnesium oxide 800 milliGRAM(s) Oral two times a day with meals  magnesium sulfate  IVPB 1 Gram(s) IV Intermittent once  mycophenolate mofetil 750 milliGRAM(s) Oral <User Schedule>  nystatin    Suspension 598536 Unit(s) Oral four times a day  nystatin Cream 1 Application(s) Topical two times a day  pantoprazole    Tablet 40 milliGRAM(s) Oral before breakfast  predniSONE   Tablet 5 milliGRAM(s) Oral daily  tamsulosin 0.8 milliGRAM(s) Oral at bedtime  trimethoprim   80 mG/sulfamethoxazole 400 mG 1 Tablet(s) Oral daily  valGANciclovir 900 milliGRAM(s) Oral daily    MEDICATIONS  (PRN):  artificial  tears Solution 1 Drop(s) Both EYES two times a day PRN Dry Eyes  diphenhydrAMINE 25 milliGRAM(s) Oral <User Schedule> PRN Insomnia      PAST MEDICAL & SURGICAL HISTORY:  GIB (gastrointestinal bleeding)  GERD with esophagitis: Gastritis &amp; Non Bleeding Ulcers  Hepatic encephalopathy  Obesity  Fatty liver disease, nonalcoholic  Renal stones: 25 years ago  Hypertension  Neuropathy  Hypercholesteremia  Diabetes  S/P cholecystectomy      Vital Signs Last 24 Hrs  T(C): 36.6 (09 Aug 2020 09:00), Max: 36.8 (08 Aug 2020 21:00)  T(F): 97.9 (09 Aug 2020 09:00), Max: 98.3 (08 Aug 2020 21:00)  HR: 79 (09 Aug 2020 09:00) (79 - 90)  BP: 119/60 (09 Aug 2020 09:00) (115/60 - 135/69)  BP(mean): --  RR: 18 (09 Aug 2020 09:00) (18 - 18)  SpO2: 100% (09 Aug 2020 09:00) (97% - 100%)    I&O's Summary    08 Aug 2020 07:01  -  09 Aug 2020 07:00  --------------------------------------------------------  IN: 1180 mL / OUT: 1800 mL / NET: -620 mL    09 Aug 2020 07:01  -  09 Aug 2020 09:21  --------------------------------------------------------  IN: 0 mL / OUT: 250 mL / NET: -250 mL                              8.5    2.30  )-----------( 103      ( 09 Aug 2020 05:58 )             26.0     08-09    139  |  103  |  33<H>  ----------------------------<  200<H>  4.5   |  23  |  1.06    Ca    8.8      09 Aug 2020 05:58  Phos  3.1     08-09  Mg     1.9     08-09    TPro  5.7<L>  /  Alb  3.3  /  TBili  0.4  /  DBili  0.2  /  AST  16  /  ALT  26  /  AlkPhos  111  08-09    Review of symptoms:   Gen: no weight changes  Skin: no rashes  Head/Eyes/Ears/Mouth: no headache, normal hearing, no changes to vision  Respiratory: no dyspnea, cough  CV: No chest pain, PND, orthopnea  GI: stool incontinence  : no pain on urination  MSK: no joint swelling  Neuro: No dizziness  Heme: no easy bruising or bleeding   Endo: no heat/cold intolerance  Psych: no significant nervousness, anxiety, depression    Physical Exam  Constitutional: NAD/WD/WN  Eyes: Anicteric, PERRLA  ENMT: nc/at. no thrush or ulcerations appreciated  Neck: no JVD, supple  Respiratory: CTA B/L, unlabored breathing   Cardiovascular: RRR  Gastrointestinal: Soft abdomen, non tender to touch at surgical site, ND  Genitourinary: Voiding spontaneously  Extremities: SCD's in place and working bilaterally  Vascular: Palpable dp pulses bilaterally  Neurological: A&O x3, following commands. Very pleasant  Musculoskeletal: Moving all extremities  Psychiatric: Responsive TRANSPLANT SURGERY MULTIDISCIPLINARY NOTE    OLTx      Date:  7/2/2020       POD #38    Present:   Patient seen and examined with multidisciplinary team including Transplant Surgeon: Dr. Lucio, [facility] in am rounds with Dr. Lucio. Disciplines not in attendance will be notified of the plan.    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     Re-admitted on 6/11:   ·	worsening HE  ·	UTI/VRE: tx with linezolid 6/15-22, bactrim DS 6/22 - 7/2  ·	MISA/Hyponatremia  ·	UGI bleed (melena) s/p EGD (6/22) c/w hemorrhagic duodenitis/gastritis; Grade 2 EV; requiring multiple transfusions  ·	Known h/o R foot ulcer (MRI neg for OM)  ·	s/p OLTx on 7/2/2020, post op liver doppler with patent vessels  ·	post op course c/b VRE bacteremia (7/10), tx with linezolid 7/10-7/23, dapto 7/23-7/26  ·	post op course c/b delirium likely in setting of CNI toxicity; CT head neg (7/31); off FK 7/19, off cylco 7/27. Everolimus initiated 7/27     Interval events:  - no overnight events; afebrile, vitals stable.   - Awake and alert this AM. Appropriate and conversant  - Stable liver function tests  - Tolerating PO intake, no N/V  - Incontinent with BM  - WBC still downtrending, 2.3 this AM from 2.5.   -Pulm following, sleep study tonight.     Potential Discharge date: pending clinical improvement    Education:  Medications    Plan of care:  See Below          MEDICATIONS  (STANDING):  aspirin enteric coated 81 milliGRAM(s) Oral daily  chlorhexidine 2% Cloths 1 Application(s) Topical <User Schedule>  collagenase Ointment 1 Application(s) Topical daily  everolimus (ZORTRESS) 4.5 milliGRAM(s) Oral <User Schedule>  insulin glargine Injectable (LANTUS) 6 Unit(s) SubCutaneous at bedtime  insulin lispro (HumaLOG) corrective regimen sliding scale   SubCutaneous Before meals and at bedtime  insulin lispro Injectable (HumaLOG) 6 Unit(s) SubCutaneous three times a day before meals  magnesium oxide 800 milliGRAM(s) Oral two times a day with meals  mycophenolate mofetil 500 milliGRAM(s) Oral two times a day  nystatin    Suspension 736118 Unit(s) Oral four times a day  nystatin Cream 1 Application(s) Topical two times a day  pantoprazole    Tablet 40 milliGRAM(s) Oral before breakfast  predniSONE   Tablet 5 milliGRAM(s) Oral daily  tamsulosin 0.8 milliGRAM(s) Oral at bedtime  trimethoprim   80 mG/sulfamethoxazole 400 mG 1 Tablet(s) Oral daily  valGANciclovir 900 milliGRAM(s) Oral daily    MEDICATIONS  (PRN):  artificial  tears Solution 1 Drop(s) Both EYES two times a day PRN Dry Eyes  diphenhydrAMINE 25 milliGRAM(s) Oral <User Schedule> PRN Insomnia      PAST MEDICAL & SURGICAL HISTORY:  GIB (gastrointestinal bleeding)  GERD with esophagitis: Gastritis &amp; Non Bleeding Ulcers  Hepatic encephalopathy  Obesity  Fatty liver disease, nonalcoholic  Renal stones: 25 years ago  Hypertension  Neuropathy  Hypercholesteremia  Diabetes  S/P cholecystectomy      Vital Signs Last 24 Hrs  T(C): 36.9 (10 Aug 2020 09:00), Max: 37.4 (10 Aug 2020 00:33)  T(F): 98.4 (10 Aug 2020 09:00), Max: 99.4 (10 Aug 2020 00:33)  HR: 79 (10 Aug 2020 09:00) (65 - 94)  BP: 140/69 (10 Aug 2020 09:00) (101/66 - 157/95)  BP(mean): 82 (10 Aug 2020 06:50) (82 - 104)  RR: 18 (10 Aug 2020 09:00) (18 - 18)  SpO2: 100% (10 Aug 2020 09:00) (95% - 100%)    I&O's Summary    09 Aug 2020 07:01  -  10 Aug 2020 07:00  --------------------------------------------------------  IN: 980 mL / OUT: 1950 mL / NET: -970 mL          LABS:                        8.0    2.26  )-----------( 101      ( 10 Aug 2020 06:04 )             24.1     08-10    139  |  104  |  32<H>  ----------------------------<  242<H>  4.6   |  24  |  1.09    Ca    9.1      10 Aug 2020 06:04  Phos  3.0     08-10  Mg     2.0     08-10    TPro  5.3<L>  /  Alb  3.2<L>  /  TBili  0.4  /  DBili  0.2  /  AST  13  /  ALT  25  /  AlkPhos  112  08-10    PT/INR - ( 10 Aug 2020 06:04 )   PT: 12.5 sec;   INR: 1.05 ratio         PTT - ( 10 Aug 2020 06:04 )  PTT:29.6 sec    CAPILLARY BLOOD GLUCOSE      POCT Blood Glucose.: 205 mg/dL (10 Aug 2020 08:06)  POCT Blood Glucose.: 331 mg/dL (09 Aug 2020 21:49)  POCT Blood Glucose.: 274 mg/dL (09 Aug 2020 17:03)  POCT Blood Glucose.: 287 mg/dL (09 Aug 2020 11:50)    LIVER FUNCTIONS - ( 10 Aug 2020 06:04 )  Alb: 3.2 g/dL / Pro: 5.3 g/dL / ALK PHOS: 112 U/L / ALT: 25 U/L / AST: 13 U/L / GGT: x                 Cultures:        Review of symptoms:   Gen: no weight changes  Skin: no rashes,   Head/Eyes/Ears/Mouth: no headache, normal hearing, no changes to vision  Respiratory: no dyspnea, cough  CV: No chest pain, PND, orthopnea  GI: stool incontinence  : no pain on urination  MSK: no joint swelling  Neuro: No dizziness  Heme: no easy bruising or bleeding   Endo: no heat/cold intolerance  Psych: no significant nervousness, anxiety, depression    Physical Exam  Constitutional: NAD/WD/WN  Eyes: Anicteric, PERRLA  ENMT: nc/at. no thrush or ulcerations appreciated  Neck: no JVD, supple  Respiratory: CTA B/L, unlabored breathing   Cardiovascular: RRR  Gastrointestinal: Soft abdomen, non tender to touch at surgical site, ND  Genitourinary: Voiding spontaneously  Extremities: SCD's in place and working bilaterally  Vascular: Palpable dp pulses bilaterally  Neurological: A&O x3, following commands. Very pleasant  Musculoskeletal: Moving all extremities  Skin: stage 2 on sacrum- healing, heel wound healing- wound care following   Psychiatric: Responsive

## 2020-08-10 NOTE — PROGRESS NOTE ADULT - SUBJECTIVE AND OBJECTIVE BOX
INTERVAL HPI/OVERNIGHT EVENTS:    patient seen and examined  no acute overnight events  tolerating diet     Allergies    codeine (Anaphylaxis)    Intolerances    General:  No wt loss, fevers, chills, night sweats, fatigue,   Eyes:  Good vision, no reported pain  ENT:  No sore throat, pain, runny nose, dysphagia  CV:  No pain, palpitations, hypo/hypertension  Resp:  No dyspnea, cough, tachypnea, wheezing  GI:  No pain, No nausea, No vomiting, No diarrhea, No constipation, No weight loss, No fever, No pruritis, No rectal bleeding, No tarry stools, No dysphagia,  :  No pain, bleeding, incontinence, nocturia  Muscle:  No pain, weakness  Neuro:  No weakness, tingling, memory problems  Psych:  No fatigue, insomnia, mood problems, depression  Endocrine:  No polyuria, polydipsia, cold/heat intolerance  Heme:  No petechiae, ecchymosis, easy bruisability  Skin:  No rash, tattoos, scars, edema      PHYSICAL EXAM:   Vital Signs:  Vital Signs Last 24 Hrs  T(C): 36.5 (09 Aug 2020 13:00), Max: 36.8 (08 Aug 2020 21:00)  T(F): 97.7 (09 Aug 2020 13:00), Max: 98.3 (08 Aug 2020 21:00)  HR: 88 (09 Aug 2020 13:) (79 - 90)  BP: 156/72 (09 Aug 2020 13:00) (115/60 - 156/72)  BP(mean): 104 (09 Aug 2020 13:00) (104 - 104)  RR: 18 (09 Aug 2020 13:) (18 - 18)  SpO2: 100% (09 Aug 2020 13:00) (100% - 100%)  Daily     Daily Weight in k.5 (09 Aug 2020 05:00)I&O's Summary    08 Aug 2020 07:01  -  09 Aug 2020 07:00  --------------------------------------------------------  IN: 1180 mL / OUT: 1800 mL / NET: -620 mL    09 Aug 2020 07:01  -  09 Aug 2020 13:54  --------------------------------------------------------  IN: 580 mL / OUT: 550 mL / NET: 30 mL        GENERAL:   no distress  HEENT:  NC/AT  ABDOMEN:  Soft, non-tender, non-distended, normoactive bowel sounds  EXTEREMITIES:  no cyanosis,clubbing or edema  SKIN:  No rash/erythema/ecchymoses/petechiae/wounds/abscess/warm/dry  NEURO: awake, alert, no asterixis      LABS:                        8.5    2.30  )-----------( 103      ( 09 Aug 2020 05:58 )             26.0     08-    139  |  103  |  33<H>  ----------------------------<  200<H>  4.5   |  23  |  1.06    Ca    8.8      09 Aug 2020 05:58  Phos  3.1     08-  Mg     1.9     08-    TPro  5.7<L>  /  Alb  3.3  /  TBili  0.4  /  DBili  0.2  /  AST  16  /  ALT  26  /  AlkPhos  111  08-09    PT/INR - ( 09 Aug 2020 05:58 )   PT: 12.1 sec;   INR: 1.02 ratio         PTT - ( 09 Aug 2020 05:59 )  PTT:28.1 sec    amylase   lipase  RADIOLOGY & ADDITIONAL TESTS:

## 2020-08-10 NOTE — PROGRESS NOTE ADULT - ATTENDING COMMENTS
Patient seen and examined. Doing well, [facility]  Will follow up. Patient seen and examined. Doing well, is pending HSAT tonight for evaluation of nocturnal oxygen desaturation, snoring, obesity, and excessive daytime sleepiness.  Was shown how to use HSAT equipment for unattended study and nursing staff/primary team asked to limit interruptions in sleep throughout the night.  Will follow up.

## 2020-08-10 NOTE — PROGRESS NOTE ADULT - SUBJECTIVE AND OBJECTIVE BOX
Chief Complaint:  Patient is a 64y old  Male who presents with a chief complaint of Liver failure, hepatic encephalopathy (09 Aug 2020 16:25)    Reason for consult: s/p OLT    Interval Events: Pt doing well, no events    Hospital Medications:  artificial  tears Solution 1 Drop(s) Both EYES two times a day PRN  aspirin enteric coated 81 milliGRAM(s) Oral daily  chlorhexidine 2% Cloths 1 Application(s) Topical <User Schedule>  collagenase Ointment 1 Application(s) Topical daily  diphenhydrAMINE 25 milliGRAM(s) Oral <User Schedule> PRN  everolimus (ZORTRESS) 4.5 milliGRAM(s) Oral <User Schedule>  insulin glargine Injectable (LANTUS) 6 Unit(s) SubCutaneous at bedtime  insulin lispro (HumaLOG) corrective regimen sliding scale   SubCutaneous Before meals and at bedtime  insulin lispro Injectable (HumaLOG) 6 Unit(s) SubCutaneous three times a day before meals  magnesium oxide 800 milliGRAM(s) Oral two times a day with meals  mycophenolate mofetil 500 milliGRAM(s) Oral two times a day  nystatin    Suspension 589529 Unit(s) Oral four times a day  nystatin Cream 1 Application(s) Topical two times a day  pantoprazole    Tablet 40 milliGRAM(s) Oral before breakfast  predniSONE   Tablet 5 milliGRAM(s) Oral daily  tamsulosin 0.8 milliGRAM(s) Oral at bedtime  trimethoprim   80 mG/sulfamethoxazole 400 mG 1 Tablet(s) Oral daily  valGANciclovir 900 milliGRAM(s) Oral daily      ROS:   General:  No  fevers, chills, night sweats, fatigue  Eyes:  Good vision, no reported pain  ENT:  No sore throat, pain, runny nose  CV:  No pain, palpitations  Pulm:  No dyspnea, cough  GI:  See HPI, otherwise negative  :  No  incontinence, nocturia  Muscle:  No pain, weakness  Neuro:  No memory problems  Psych:  No insomnia, mood problems, depression  Endocrine:  No polyuria, polydipsia, cold/heat intolerance  Heme:  No petechiae, ecchymosis, easy bruisability  Skin:  No rash    PHYSICAL EXAM:   Vital Signs:  Vital Signs Last 24 Hrs  T(C): 36.9 (10 Aug 2020 09:00), Max: 37.4 (10 Aug 2020 00:33)  T(F): 98.4 (10 Aug 2020 09:00), Max: 99.4 (10 Aug 2020 00:33)  HR: 79 (10 Aug 2020 09:) (65 - 94)  BP: 140/69 (10 Aug 2020 09:00) (101/66 - 157/95)  BP(mean): 82 (10 Aug 2020 06:50) (82 - 104)  RR: 18 (10 Aug 2020 09:) (18 - 18)  SpO2: 100% (10 Aug 2020 09:) (95% - 100%)  Daily     Daily Weight in k.1 (10 Aug 2020 05:30)    GENERAL: no acute distress  NEURO: alert, no asterixis  HEENT: anicteric sclera, no conjunctival pallor appreciated  CHEST: no respiratory distress, no accessory muscle use  CARDIAC: regular rate, rhythm  ABDOMEN: soft, non-tender, non-distended, no rebound or guarding  EXTREMITIES: warm, well perfused, no edema  SKIN: no lesions noted    LABS: reviewed                        8.0    2.26  )-----------( 101      ( 10 Aug 2020 06:04 )             24.1     08-10    139  |  104  |  32<H>  ----------------------------<  242<H>  4.6   |  24  |  1.09    Ca    9.1      10 Aug 2020 06:04  Phos  3.0     08-10  Mg     2.0     08-10    TPro  5.3<L>  /  Alb  3.2<L>  /  TBili  0.4  /  DBili  0.2  /  AST  13  /  ALT  25  /  AlkPhos  112  08-10    LIVER FUNCTIONS - ( 10 Aug 2020 06:04 )  Alb: 3.2 g/dL / Pro: 5.3 g/dL / ALK PHOS: 112 U/L / ALT: 25 U/L / AST: 13 U/L / GGT: x             Interval Diagnostic Studies: see sunrise for full report

## 2020-08-10 NOTE — PROGRESS NOTE ADULT - ASSESSMENT
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 c/b VRE bactermia and delirium    [] POD 36 s/p OLT   - Good graft function, LFTs stable   - Immuno:  Everolimus 4mg bid, Pred 5, decrease MMF to 500 BID give leukopenia     -->Tacro DC'd 7/19. Cyclosporine DC'd 7/27.  Changed to Everolimus on 7/27 due to persistent delirium/AMS, now with improvement     -->Daily Everolimus leve  - PPX: Valcyte/Nystatin/Bactrim  - Mental status improving slowly.  CT Head negative  - Diet: Regular, tolerating, PO intake improving  - Continue ASA 81mg daily  - h/o UGI bleed: Protonix 40mg daily  - DVT PPx: SCDs at all times  - Sacral decub: seen by wound care, recs appreciated. Healing well.   - Pain control PRN  - keep Mag >2  - Uses CPAP overnight; was observed off CPAP 8/6 night, was noted to have several episodes of desaturation. Seen by pulm 8/7, sleep study planned for tomorrow. Will need to keep CPAP off tonight in preparation for the study tomorrow.     [] VRE bacteremia (7/10)  - tx with linezolid 7/10-7/23,  Daptomycin 7/23-7/26   - BCx from 7/20 with NG, UCx (7/20) with NG     [] DM  - Appreciate Endocrinology recommendations  - Continue lantus 6u qhs and ISS  - Resume premeal insulin 6 U Humalog  - Fingersticks ac and qhs    [] Heel ulcer  - Healing well.   - Appreciate podiatry recommendations    [] Dispo:    - continue 1:1 overnight (from 11 PM and 7AM).   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 c/b VRE bactermia and delirium    [] POD 38 s/p OLT   - Good graft function, LFTs stable   - Immuno:  Everolimus 4.5 mg bid, Pred 5, decrease MMF to 500 BID give leukopenia     -->Tacro DC'd 7/19. Cyclosporine DC'd 7/27.  Changed to Everolimus on 7/27 due to persistent delirium/AMS, now with improvement     -->Daily Everolimus leve  - PPX: Valcyte/Nystatin/Bactrim  - Mental status improving slowly.  CT Head negative  - Diet: Regular, tolerating, PO intake improving  - Continue ASA 81mg daily  - h/o UGI bleed: Protonix 40mg daily  - DVT PPx: SCDs at all times  - Sacral decub: seen by wound care, recs appreciated. Healing well.   - Pain control PRN  - keep Mag >2  - Check lipid panel prior to d/c home.   - Uses CPAP overnight; was observed off CPAP 8/6 night, was noted to have several episodes of desaturation. Seen by pulm 8/7, sleep study planned for tonight. Will need to keep CPAP off tonight in preparation for the study tomorrow.     [] VRE bacteremia (7/10)  - tx with linezolid 7/10-7/23,  Daptomycin 7/23-7/26   - BCx from 7/20 with NG, UCx (7/20) with NG     [] DM  - Appreciate Endocrinology recommendations  - Continue lantus 6u qhs and ISS  - Resume premeal insulin 6 U Humalog  - Fingersticks ac and qhs    [] Heel ulcer  - Healing well.   - Appreciate podiatry recommendations    [] Dispo:    - continue 1:1 overnight (from 11 PM and 7AM).   - expect d/c home with home PT, potentially this week  - daily PT/OT (team aware)  - discharge planning in progress w/ Liver transplant

## 2020-08-10 NOTE — PROGRESS NOTE ADULT - ASSESSMENT
65 y/o male with PMH of decompensated JEAN cirrhosis, GAVE, obesity, DM, told in past he has SHENG, now s/p orthotopic liver transplant on 7/2/2020.  Post transplant, while in a monitored setting noted to have witnessed episodes of apneic episodes while sleeping, associated with (desaturations (including when using plain NC). Primary team started him on CPAP 12 cm H2O in SICU post op. Reports non-restorative sleep, frequent nighttime awakenings to urinate, dry mouth in the am. Reports sleep studies that were done in the past that were normal. Pulmonary consulted for assistance with obtaining CPAP machine on discharge.     -Pan for an HSAT (Home sleep apnea test) in the hospital Today  -will coordinate with the sleep lab  -Once study is complete, will interpret and assess if SHENG is present and if it is then will determine disease burden  -Will continue to follow 65 y/o male with PMH of decompensated JEAN cirrhosis, GAVE, obesity, DM, told in past he has SHENG, now s/p orthotopic liver transplant on 7/2/2020.  Post transplant, while in a monitored setting noted to have witnessed episodes of apneic episodes while sleeping, associated with (desaturations (including when using plain NC). Primary team started him on CPAP 12 cm H2O in SICU post op. Reports non-restorative sleep, frequent nighttime awakenings to urinate, dry mouth in the am. Reports sleep studies that were done in the past that were normal. Pulmonary consulted for assistance with obtaining CPAP machine on discharge.     -Pan for an HSAT (Home sleep apnea test) in the hospital Today   - limit VS checks, blood draws while on the sleep test   - do not provide supplemental O2 if patient becomes hypoxic. Would only add on NC if O2 sat is below 80% and then once back to normal remove NC   -will coordinate with the sleep lab  -Once study is complete, will interpret and assess if SHENG is present and if it is then will determine disease burden  -Will continue to follow

## 2020-08-10 NOTE — PROGRESS NOTE ADULT - ASSESSMENT
64 M hx of DM, HLD, GERD, HFpEF with mild LV diastolic dysfunction, and decompensated JEAN cirrhosis c/b ascites with hx of SBP, HE, duodenal ulcer, GAVE and duodenal AVM s/p APC (last on 10/11/19), s/p liver transplant this admission (7/2/20). Post-op course c/b tacrolimus toxicity and hospital delerium, improving after stopping CNI and replacing w/ everolimus.     Impression:  #S/p liver transplant 7/2: liver enzymes normalized.   OR details:  - L groin cutdown for vv bypass   - anastomosis: bicaval end-end/CBD duct-duct/HA & PV end-end, all standard anatomy.    - IOC with good flow  - Simulect induction. 500mg Solumedrol  - JPs x3 with T-Tube  - 14U pRBC, 14U FFP, 10 PLT, 11 CRYO, 500mL returned from cell saver, EBL 5L, UOP 1100mL  Recipient Info:   ABO: A  CMV:  Neg  EBV Positive  Donor:  Donor ID:  [id number] Match: [id number]  Age: 29 ABO:  A1 High Risk:   No COD: Cardiovascular  Anti CMV positive, EBV IgG- positive  HepBcAb-neg, Hepatitis C-DANA- neg, Hepatitis C ab-neg    #Halluciniations/agitation/AMS/tremors – due to CNI toxicity, improving now that CNI discontinued and replaced with everolimus  #Sacral decubitus ulcer: stage I, does not appear infected  #R posterior heel wound w/ overlying eschar –Local wound care. no signs of infections, XR neg for gas, evaluated by podiatry and ID. MRI w/o contrast limited, but no overt signs of active infection.  #GI bleed: Resolved. Hgb stable with no overt bleeding post-transplant. 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 GAVE and PHG likely improved pos ttransplant.  #ESBL UTI with h/o prostatic abscess: Resolved with IV abx  #VRE bacteremia: Resolved with Linezolid and meropenem     Recommendation:  - awaiting sleep study prior to discharge  - continue with everolimus dosed per Transplant surgery, f/u daily levels (currently 4.5mg BID)  - continue Cellcept 500 mg BID as per Transplant surgery  - continue prednisone 5 mg PO daily  - continue w/ PPI daily for GI prophylaxis   - frequent reorientation and visitation, normalized sleep/wake cycle environment to mitigate hospital delerium    - continue nystatin, Bactrim and Valcyte ppx  - physical therapy daily/BID  - continue with mag oxide 200 mg BID  - c/w ASA 81mg daily  - continue to hold diuretics  - inpatient sleep study as per Transplant surgery   - outpatient follow up with Dr. Medina within 1-2 weeks post discharge (already being arranged by liver clinic [phone])    Ze Dominguez  Gastroenterology Fellow  AT NIGHT AND ON WEEKENDS:  Contact on-call GI fellow via answering service ([phone]) from 5pm-8am and on weekends/holidays  MONDAY-FRIDAY 8AM-5PM:  Available via [employer] Teams  Call [phone] ([facility]) or Page [phone] ([facility]) from 8am-5pm M-F

## 2020-08-10 NOTE — PROGRESS NOTE ADULT - ASSESSMENT
65 y/o M PMHx decompensated JEAN Cirrhosis on transplant list, DMII, HFpEF, recent admission for ESBL E coli prostatic abscess 2/2 4 wks ertapenem, hx of ESBL UTIs with prostate abscess s/p IV abx, recent VRE urinary colonization came to hospital for worsening jaundice and episode of confusion, resolved PTA. s/p treatment course for possible VRE UTI. Now s/p OLT on 7/2/20. Course complicated by Encephalopathy and Tremors. Blood Cultures (7/10) with VRE sensitive to Linezolid. CT C/A/P without obvious intraabdominal source. Hypoxia prior to positive BCx which may be related to sepsis CT Chest (7/11) without evidence of pneumonia. Had L groin fluid collection which appears to be consistent with seroma - sample obtained for culture also negative. No drainage, erythema or underlying fluctuance was appreciated at the right heel. MRI prior to transplant without obvious infection. Transferred from [facility] to [facility] on 7/17/2020.    RECOMMENDATIONS:    #Leukopenia  - Continue to monitor counts - slight decrease today  - Bactrim and Valcyte could be contributing. With re to Valcyte patient is high risk i.e. D+/R-. With re to Bactrim it is also serving as UTI PPx given recurrent ESBL e coli UTI and prostate abscess prior to transplant    #Encephalopathy (improving)  - Switched to Everolimus on 7/27 to avoid CNI CNS Toxicity   - CT result(7/31): Slight progression to atrophic changes compared with 6/11/2020 which may be the result of treatment-related changes. No other significant pathology  - Continue to monitor    #VRE Bacteremia, L Groin Collection, Encephalopathy (improving)  - s/p Linezolid and Daptomycin ended on 7/26 for VRE bacteremia post-OLT  - Abd US showed 9cm Left groin collection: likely represents a seroma or lymphocele; aspirated - cx: PMN seen, no organism seen  - Continue monitoring cylosporine levels  - TTE negative for endocarditis  - UA/UC on 7/20 no UTI; UA on 7/30 negative  - BCx on 7/20 NGTD    #s/p OLT   - Continue bactrim, valcyte (CMV D+/R-) for prophylaxis  - Monitor patient for tremors - was on tacrolimus(CI), then cylosporine(CI), now on Everolimus(mTor I)    #Right heel wound  --Podiatry on board    --Local wound care     I will continue to follow. Please feel free to contact me with any further questions.    Eusebio Pérez M.D.  [facility] Division of Infectious Disease  8AM-5PM: Pager Number [phone]  After Hours (or if no response): Please contact the Infectious Diseases Office at [phone]     The above assessment and plan were discussed with Flora, Transplant Surgery NP

## 2020-08-10 NOTE — PROGRESS NOTE ADULT - ATTENDING COMMENTS
s/p OLT, clinically improving  mental status significantly better since holding tacrolimus  immunosuppression- cont everolimus 4.5mg bid, prednisone 5mg daily, decrease cellcept to 500mg bid  awaiting sleep study tonight

## 2020-08-10 NOTE — PROGRESS NOTE ADULT - SUBJECTIVE AND OBJECTIVE BOX
CHIEF COMPLAINT: liver transplant     Interval Events: No issues overnight.     REVIEW OF SYSTEMS:  [x] All other systems negative  [ ] Unable to assess ROS because ________    OBJECTIVE:  ICU Vital Signs Last 24 Hrs  T(C): 36.9 (10 Aug 2020 09:00), Max: 37.4 (10 Aug 2020 00:33)  T(F): 98.4 (10 Aug 2020 09:00), Max: 99.4 (10 Aug 2020 00:33)  HR: 79 (10 Aug 2020 09:00) (65 - 94)  BP: 140/69 (10 Aug 2020 09:00) (101/66 - 157/95)  BP(mean): 82 (10 Aug 2020 06:50) (82 - 104)  ABP: --  ABP(mean): --  RR: 18 (10 Aug 2020 09:00) (18 - 18)  SpO2: 100% (10 Aug 2020 09:00) (95% - 100%)        08-09 @ 07:01  -  08-10 @ 07:00  --------------------------------------------------------  IN: 980 mL / OUT: 1950 mL / NET: -970 mL      CAPILLARY BLOOD GLUCOSE      POCT Blood Glucose.: 205 mg/dL (10 Aug 2020 08:06)      PHYSICAL EXAM:  GENERAL: NAD, well-groomed, well-developed  CHEST/LUNG: Clear to percussion bilaterally; No rales, rhonchi, wheezing, or rubs  HEART: Regular rate and rhythm; No murmurs, rubs, or gallops  ABDOMEN: Soft, Nontender, Nondistended; Bowel sounds present  VASCULAR:  2+ Peripheral Pulses, No clubbing, cyanosis, or edema  LYMPH: No lymphadenopathy noted  SKIN: No rashes or lesions  NERVOUS SYSTEM:  Alert & Oriented X3, Good concentration      HOSPITAL MEDICATIONS:  MEDICATIONS  (STANDING):  aspirin enteric coated 81 milliGRAM(s) Oral daily  chlorhexidine 2% Cloths 1 Application(s) Topical <User Schedule>  collagenase Ointment 1 Application(s) Topical daily  everolimus (ZORTRESS) 4.5 milliGRAM(s) Oral <User Schedule>  insulin glargine Injectable (LANTUS) 6 Unit(s) SubCutaneous at bedtime  insulin lispro (HumaLOG) corrective regimen sliding scale   SubCutaneous Before meals and at bedtime  insulin lispro Injectable (HumaLOG) 6 Unit(s) SubCutaneous three times a day before meals  magnesium oxide 800 milliGRAM(s) Oral two times a day with meals  mycophenolate mofetil 500 milliGRAM(s) Oral two times a day  nystatin    Suspension 784292 Unit(s) Oral four times a day  nystatin Cream 1 Application(s) Topical two times a day  pantoprazole    Tablet 40 milliGRAM(s) Oral before breakfast  predniSONE   Tablet 5 milliGRAM(s) Oral daily  tamsulosin 0.8 milliGRAM(s) Oral at bedtime  trimethoprim   80 mG/sulfamethoxazole 400 mG 1 Tablet(s) Oral daily  valGANciclovir 900 milliGRAM(s) Oral daily    MEDICATIONS  (PRN):  artificial  tears Solution 1 Drop(s) Both EYES two times a day PRN Dry Eyes  diphenhydrAMINE 25 milliGRAM(s) Oral <User Schedule> PRN Insomnia      LABS:                        8.0    2.26  )-----------( 101      ( 10 Aug 2020 06:04 )             24.1     08-10    139  |  104  |  32<H>  ----------------------------<  242<H>  4.6   |  24  |  1.09    Ca    9.1      10 Aug 2020 06:04  Phos  3.0     08-10  Mg     2.0     08-10    TPro  5.3<L>  /  Alb  3.2<L>  /  TBili  0.4  /  DBili  0.2  /  AST  13  /  ALT  25  /  AlkPhos  112  08-10    PT/INR - ( 10 Aug 2020 06:04 )   PT: 12.5 sec;   INR: 1.05 ratio         PTT - ( 10 Aug 2020 06:04 )  PTT:29.6 sec          MICROBIOLOGY:     RADIOLOGY:  [x ] Reviewed and interpreted by me      Point of Care Ultrasound Findings:    PFT:    EKG:

## 2020-08-11 ENCOUNTER — TRANSCRIPTION ENCOUNTER (OUTPATIENT)
Age: 65
End: 2020-08-11

## 2020-08-11 VITALS
RESPIRATION RATE: 18 BRPM | SYSTOLIC BLOOD PRESSURE: 125 MMHG | DIASTOLIC BLOOD PRESSURE: 64 MMHG | HEART RATE: 80 BPM | TEMPERATURE: 98 F | OXYGEN SATURATION: 100 %

## 2020-08-11 LAB
ALBUMIN SERPL ELPH-MCNC: 3.4 G/DL — SIGNIFICANT CHANGE UP (ref 3.3–5)
ALP SERPL-CCNC: 109 U/L — SIGNIFICANT CHANGE UP (ref 40–120)
ALT FLD-CCNC: 25 U/L — SIGNIFICANT CHANGE UP (ref 10–45)
ANION GAP SERPL CALC-SCNC: 11 MMOL/L — SIGNIFICANT CHANGE UP (ref 5–17)
AST SERPL-CCNC: 18 U/L — SIGNIFICANT CHANGE UP (ref 10–40)
BASOPHILS # BLD AUTO: 0.03 K/UL — SIGNIFICANT CHANGE UP (ref 0–0.2)
BASOPHILS NFR BLD AUTO: 1.5 % — SIGNIFICANT CHANGE UP (ref 0–2)
BILIRUB DIRECT SERPL-MCNC: 0.1 MG/DL — SIGNIFICANT CHANGE UP (ref 0–0.2)
BILIRUB INDIRECT FLD-MCNC: 0.2 MG/DL — SIGNIFICANT CHANGE UP (ref 0.2–1)
BILIRUB SERPL-MCNC: 0.3 MG/DL — SIGNIFICANT CHANGE UP (ref 0.2–1.2)
BUN SERPL-MCNC: 30 MG/DL — HIGH (ref 7–23)
CALCIUM SERPL-MCNC: 9.1 MG/DL — SIGNIFICANT CHANGE UP (ref 8.4–10.5)
CHLORIDE SERPL-SCNC: 105 MMOL/L — SIGNIFICANT CHANGE UP (ref 96–108)
CO2 SERPL-SCNC: 24 MMOL/L — SIGNIFICANT CHANGE UP (ref 22–31)
CREAT SERPL-MCNC: 0.96 MG/DL — SIGNIFICANT CHANGE UP (ref 0.5–1.3)
EOSINOPHIL # BLD AUTO: 0.13 K/UL — SIGNIFICANT CHANGE UP (ref 0–0.5)
EOSINOPHIL NFR BLD AUTO: 6.5 % — HIGH (ref 0–6)
GLUCOSE BLDC GLUCOMTR-MCNC: 167 MG/DL — HIGH (ref 70–99)
GLUCOSE BLDC GLUCOMTR-MCNC: 200 MG/DL — HIGH (ref 70–99)
GLUCOSE BLDC GLUCOMTR-MCNC: 255 MG/DL — HIGH (ref 70–99)
GLUCOSE SERPL-MCNC: 198 MG/DL — HIGH (ref 70–99)
HCT VFR BLD CALC: 25 % — LOW (ref 39–50)
HGB BLD-MCNC: 8.3 G/DL — LOW (ref 13–17)
LYMPHOCYTES # BLD AUTO: 0.37 K/UL — LOW (ref 1–3.3)
LYMPHOCYTES # BLD AUTO: 18.4 % — SIGNIFICANT CHANGE UP (ref 13–44)
MAGNESIUM SERPL-MCNC: 1.8 MG/DL — SIGNIFICANT CHANGE UP (ref 1.6–2.6)
MCHC RBC-ENTMCNC: 31.7 PG — SIGNIFICANT CHANGE UP (ref 27–34)
MCHC RBC-ENTMCNC: 33.2 GM/DL — SIGNIFICANT CHANGE UP (ref 32–36)
MCV RBC AUTO: 95.4 FL — SIGNIFICANT CHANGE UP (ref 80–100)
MONOCYTES # BLD AUTO: 0.18 K/UL — SIGNIFICANT CHANGE UP (ref 0–0.9)
MONOCYTES NFR BLD AUTO: 9 % — SIGNIFICANT CHANGE UP (ref 2–14)
NEUTROPHILS # BLD AUTO: 1.3 K/UL — LOW (ref 1.8–7.4)
NEUTROPHILS NFR BLD AUTO: 64.6 % — SIGNIFICANT CHANGE UP (ref 43–77)
NRBC # BLD: 0 /100 WBCS — SIGNIFICANT CHANGE UP (ref 0–0)
PHOSPHATE SERPL-MCNC: 3 MG/DL — SIGNIFICANT CHANGE UP (ref 2.5–4.5)
PLATELET # BLD AUTO: 107 K/UL — LOW (ref 150–400)
POTASSIUM SERPL-MCNC: 4.4 MMOL/L — SIGNIFICANT CHANGE UP (ref 3.5–5.3)
POTASSIUM SERPL-SCNC: 4.4 MMOL/L — SIGNIFICANT CHANGE UP (ref 3.5–5.3)
PROT SERPL-MCNC: 5.4 G/DL — LOW (ref 6–8.3)
RBC # BLD: 2.62 M/UL — LOW (ref 4.2–5.8)
RBC # FLD: 16.7 % — HIGH (ref 10.3–14.5)
SODIUM SERPL-SCNC: 140 MMOL/L — SIGNIFICANT CHANGE UP (ref 135–145)
WBC # BLD: 2.01 K/UL — LOW (ref 3.8–10.5)
WBC # FLD AUTO: 2.01 K/UL — LOW (ref 3.8–10.5)

## 2020-08-11 PROCEDURE — 74177 CT ABD & PELVIS W/CONTRAST: CPT

## 2020-08-11 PROCEDURE — 82570 ASSAY OF URINE CREATININE: CPT

## 2020-08-11 PROCEDURE — 88313 SPECIAL STAINS GROUP 2: CPT

## 2020-08-11 PROCEDURE — 86860 RBC ANTIBODY ELUTION: CPT

## 2020-08-11 PROCEDURE — 86704 HEP B CORE ANTIBODY TOTAL: CPT

## 2020-08-11 PROCEDURE — 80197 ASSAY OF TACROLIMUS: CPT

## 2020-08-11 PROCEDURE — 99232 SBSQ HOSP IP/OBS MODERATE 35: CPT | Mod: GC

## 2020-08-11 PROCEDURE — 94002 VENT MGMT INPAT INIT DAY: CPT

## 2020-08-11 PROCEDURE — 76705 ECHO EXAM OF ABDOMEN: CPT

## 2020-08-11 PROCEDURE — 74300 X-RAY BILE DUCTS/PANCREAS: CPT

## 2020-08-11 PROCEDURE — 85385 FIBRINOGEN ANTIGEN: CPT

## 2020-08-11 PROCEDURE — P9011: CPT

## 2020-08-11 PROCEDURE — U0003: CPT

## 2020-08-11 PROCEDURE — 84145 PROCALCITONIN (PCT): CPT

## 2020-08-11 PROCEDURE — 82436 ASSAY OF URINE CHLORIDE: CPT

## 2020-08-11 PROCEDURE — 99285 EMERGENCY DEPT VISIT HI MDM: CPT

## 2020-08-11 PROCEDURE — 87640 STAPH A DNA AMP PROBE: CPT

## 2020-08-11 PROCEDURE — 83605 ASSAY OF LACTIC ACID: CPT

## 2020-08-11 PROCEDURE — 97110 THERAPEUTIC EXERCISES: CPT

## 2020-08-11 PROCEDURE — 84132 ASSAY OF SERUM POTASSIUM: CPT

## 2020-08-11 PROCEDURE — 80053 COMPREHEN METABOLIC PANEL: CPT

## 2020-08-11 PROCEDURE — 82435 ASSAY OF BLOOD CHLORIDE: CPT

## 2020-08-11 PROCEDURE — 85610 PROTHROMBIN TIME: CPT

## 2020-08-11 PROCEDURE — 82465 ASSAY BLD/SERUM CHOLESTEROL: CPT

## 2020-08-11 PROCEDURE — 70450 CT HEAD/BRAIN W/O DYE: CPT

## 2020-08-11 PROCEDURE — 86945 BLOOD PRODUCT/IRRADIATION: CPT

## 2020-08-11 PROCEDURE — 73723 MRI JOINT LWR EXTR W/O&W/DYE: CPT

## 2020-08-11 PROCEDURE — 82310 ASSAY OF CALCIUM: CPT

## 2020-08-11 PROCEDURE — P9012: CPT

## 2020-08-11 PROCEDURE — 93321 DOPPLER ECHO F-UP/LMTD STD: CPT

## 2020-08-11 PROCEDURE — 72192 CT PELVIS W/O DYE: CPT

## 2020-08-11 PROCEDURE — 83540 ASSAY OF IRON: CPT

## 2020-08-11 PROCEDURE — P9022: CPT

## 2020-08-11 PROCEDURE — 81001 URINALYSIS AUTO W/SCOPE: CPT

## 2020-08-11 PROCEDURE — 71260 CT THORAX DX C+: CPT

## 2020-08-11 PROCEDURE — 82043 UR ALBUMIN QUANTITATIVE: CPT

## 2020-08-11 PROCEDURE — 82565 ASSAY OF CREATININE: CPT

## 2020-08-11 PROCEDURE — 85730 THROMBOPLASTIN TIME PARTIAL: CPT

## 2020-08-11 PROCEDURE — 97535 SELF CARE MNGMENT TRAINING: CPT

## 2020-08-11 PROCEDURE — 82803 BLOOD GASES ANY COMBINATION: CPT

## 2020-08-11 PROCEDURE — 99232 SBSQ HOSP IP/OBS MODERATE 35: CPT

## 2020-08-11 PROCEDURE — 80048 BASIC METABOLIC PNL TOTAL CA: CPT

## 2020-08-11 PROCEDURE — 71045 X-RAY EXAM CHEST 1 VIEW: CPT

## 2020-08-11 PROCEDURE — 80169 DRUG ASSAY EVEROLIMUS: CPT

## 2020-08-11 PROCEDURE — 86901 BLOOD TYPING SEROLOGIC RH(D): CPT

## 2020-08-11 PROCEDURE — 97129 THER IVNTJ 1ST 15 MIN: CPT

## 2020-08-11 PROCEDURE — 97166 OT EVAL MOD COMPLEX 45 MIN: CPT

## 2020-08-11 PROCEDURE — 84300 ASSAY OF URINE SODIUM: CPT

## 2020-08-11 PROCEDURE — 88309 TISSUE EXAM BY PATHOLOGIST: CPT

## 2020-08-11 PROCEDURE — 94660 CPAP INITIATION&MGMT: CPT

## 2020-08-11 PROCEDURE — P9037: CPT

## 2020-08-11 PROCEDURE — 83010 ASSAY OF HAPTOGLOBIN QUANT: CPT

## 2020-08-11 PROCEDURE — 82247 BILIRUBIN TOTAL: CPT

## 2020-08-11 PROCEDURE — 87070 CULTURE OTHR SPECIMN AEROBIC: CPT

## 2020-08-11 PROCEDURE — 87635 SARS-COV-2 COVID-19 AMP PRB: CPT

## 2020-08-11 PROCEDURE — 87040 BLOOD CULTURE FOR BACTERIA: CPT

## 2020-08-11 PROCEDURE — 99238 HOSP IP/OBS DSCHRG MGMT 30/<: CPT | Mod: GC,24

## 2020-08-11 PROCEDURE — 85014 HEMATOCRIT: CPT

## 2020-08-11 PROCEDURE — 87102 FUNGUS ISOLATION CULTURE: CPT

## 2020-08-11 PROCEDURE — 83721 ASSAY OF BLOOD LIPOPROTEIN: CPT

## 2020-08-11 PROCEDURE — 82668 ASSAY OF ERYTHROPOIETIN: CPT

## 2020-08-11 PROCEDURE — 84478 ASSAY OF TRIGLYCERIDES: CPT

## 2020-08-11 PROCEDURE — 80061 LIPID PANEL: CPT

## 2020-08-11 PROCEDURE — P9041: CPT

## 2020-08-11 PROCEDURE — 87186 SC STD MICRODIL/AGAR DIL: CPT

## 2020-08-11 PROCEDURE — 86923 COMPATIBILITY TEST ELECTRIC: CPT

## 2020-08-11 PROCEDURE — 86850 RBC ANTIBODY SCREEN: CPT

## 2020-08-11 PROCEDURE — 83550 IRON BINDING TEST: CPT

## 2020-08-11 PROCEDURE — 93926 LOWER EXTREMITY STUDY: CPT

## 2020-08-11 PROCEDURE — 84100 ASSAY OF PHOSPHORUS: CPT

## 2020-08-11 PROCEDURE — P9017: CPT

## 2020-08-11 PROCEDURE — 84156 ASSAY OF PROTEIN URINE: CPT

## 2020-08-11 PROCEDURE — 93975 VASCULAR STUDY: CPT

## 2020-08-11 PROCEDURE — P9047: CPT

## 2020-08-11 PROCEDURE — 83036 HEMOGLOBIN GLYCOSYLATED A1C: CPT

## 2020-08-11 PROCEDURE — 85045 AUTOMATED RETICULOCYTE COUNT: CPT

## 2020-08-11 PROCEDURE — 86922 COMPATIBILITY TEST ANTIGLOB: CPT

## 2020-08-11 PROCEDURE — 97164 PT RE-EVAL EST PLAN CARE: CPT

## 2020-08-11 PROCEDURE — 82340 ASSAY OF CALCIUM IN URINE: CPT

## 2020-08-11 PROCEDURE — C1769: CPT

## 2020-08-11 PROCEDURE — 83615 LACTATE (LD) (LDH) ENZYME: CPT

## 2020-08-11 PROCEDURE — 85384 FIBRINOGEN ACTIVITY: CPT

## 2020-08-11 PROCEDURE — C8929: CPT

## 2020-08-11 PROCEDURE — 87075 CULTR BACTERIA EXCEPT BLOOD: CPT

## 2020-08-11 PROCEDURE — 87340 HEPATITIS B SURFACE AG IA: CPT

## 2020-08-11 PROCEDURE — 36430 TRANSFUSION BLD/BLD COMPNT: CPT

## 2020-08-11 PROCEDURE — P9016: CPT

## 2020-08-11 PROCEDURE — 83735 ASSAY OF MAGNESIUM: CPT

## 2020-08-11 PROCEDURE — A9585: CPT

## 2020-08-11 PROCEDURE — 97116 GAIT TRAINING THERAPY: CPT

## 2020-08-11 PROCEDURE — 97530 THERAPEUTIC ACTIVITIES: CPT

## 2020-08-11 PROCEDURE — 94003 VENT MGMT INPAT SUBQ DAY: CPT

## 2020-08-11 PROCEDURE — 86706 HEP B SURFACE ANTIBODY: CPT

## 2020-08-11 PROCEDURE — 80076 HEPATIC FUNCTION PANEL: CPT

## 2020-08-11 PROCEDURE — 82962 GLUCOSE BLOOD TEST: CPT

## 2020-08-11 PROCEDURE — 84540 ASSAY OF URINE/UREA-N: CPT

## 2020-08-11 PROCEDURE — 97112 NEUROMUSCULAR REEDUCATION: CPT

## 2020-08-11 PROCEDURE — 87338 HPYLORI STOOL AG IA: CPT

## 2020-08-11 PROCEDURE — 88304 TISSUE EXAM BY PATHOLOGIST: CPT

## 2020-08-11 PROCEDURE — 82746 ASSAY OF FOLIC ACID SERUM: CPT

## 2020-08-11 PROCEDURE — P9059: CPT

## 2020-08-11 PROCEDURE — P9045: CPT

## 2020-08-11 PROCEDURE — 84133 ASSAY OF URINE POTASSIUM: CPT

## 2020-08-11 PROCEDURE — 82248 BILIRUBIN DIRECT: CPT

## 2020-08-11 PROCEDURE — 82550 ASSAY OF CK (CPK): CPT

## 2020-08-11 PROCEDURE — 87150 DNA/RNA AMPLIFIED PROBE: CPT

## 2020-08-11 PROCEDURE — 74018 RADEX ABDOMEN 1 VIEW: CPT

## 2020-08-11 PROCEDURE — 85396 CLOTTING ASSAY WHOLE BLOOD: CPT

## 2020-08-11 PROCEDURE — 76700 US EXAM ABDOM COMPLETE: CPT

## 2020-08-11 PROCEDURE — 82607 VITAMIN B-12: CPT

## 2020-08-11 PROCEDURE — 87641 MR-STAPH DNA AMP PROBE: CPT

## 2020-08-11 PROCEDURE — 80158 DRUG ASSAY CYCLOSPORINE: CPT

## 2020-08-11 PROCEDURE — 88312 SPECIAL STAINS GROUP 1: CPT

## 2020-08-11 PROCEDURE — 73630 X-RAY EXAM OF FOOT: CPT

## 2020-08-11 PROCEDURE — 86900 BLOOD TYPING SEROLOGIC ABO: CPT

## 2020-08-11 PROCEDURE — C1889: CPT

## 2020-08-11 PROCEDURE — P9040: CPT

## 2020-08-11 PROCEDURE — 83935 ASSAY OF URINE OSMOLALITY: CPT

## 2020-08-11 PROCEDURE — 82330 ASSAY OF CALCIUM: CPT

## 2020-08-11 PROCEDURE — 87205 SMEAR GRAM STAIN: CPT

## 2020-08-11 PROCEDURE — 87086 URINE CULTURE/COLONY COUNT: CPT

## 2020-08-11 PROCEDURE — 83718 ASSAY OF LIPOPROTEIN: CPT

## 2020-08-11 PROCEDURE — 82977 ASSAY OF GGT: CPT

## 2020-08-11 PROCEDURE — 85027 COMPLETE CBC AUTOMATED: CPT

## 2020-08-11 PROCEDURE — 82947 ASSAY GLUCOSE BLOOD QUANT: CPT

## 2020-08-11 PROCEDURE — 99233 SBSQ HOSP IP/OBS HIGH 50: CPT | Mod: GC

## 2020-08-11 PROCEDURE — 94640 AIRWAY INHALATION TREATMENT: CPT

## 2020-08-11 PROCEDURE — 93308 TTE F-UP OR LMTD: CPT

## 2020-08-11 PROCEDURE — 93005 ELECTROCARDIOGRAM TRACING: CPT

## 2020-08-11 PROCEDURE — 86880 COOMBS TEST DIRECT: CPT

## 2020-08-11 PROCEDURE — 97162 PT EVAL MOD COMPLEX 30 MIN: CPT

## 2020-08-11 PROCEDURE — C1751: CPT

## 2020-08-11 PROCEDURE — 82140 ASSAY OF AMMONIA: CPT

## 2020-08-11 PROCEDURE — 84295 ASSAY OF SERUM SODIUM: CPT

## 2020-08-11 RX ORDER — NYSTATIN 500MM UNIT
5 POWDER (EA) MISCELLANEOUS
Qty: 0 | Refills: 0 | DISCHARGE
Start: 2020-08-11

## 2020-08-11 RX ORDER — TAMSULOSIN HYDROCHLORIDE 0.4 MG/1
2 CAPSULE ORAL
Qty: 0 | Refills: 0 | DISCHARGE
Start: 2020-08-11

## 2020-08-11 RX ORDER — ASPIRIN/CALCIUM CARB/MAGNESIUM 324 MG
1 TABLET ORAL
Qty: 0 | Refills: 0 | DISCHARGE
Start: 2020-08-11

## 2020-08-11 RX ORDER — NYSTATIN CREAM 100000 [USP'U]/G
1 CREAM TOPICAL
Qty: 0 | Refills: 0 | DISCHARGE
Start: 2020-08-11

## 2020-08-11 RX ORDER — EVEROLIMUS 10 MG/1
6 TABLET ORAL
Qty: 0 | Refills: 0 | DISCHARGE
Start: 2020-08-11

## 2020-08-11 RX ORDER — FOLIC ACID 0.8 MG
1 TABLET ORAL
Qty: 0 | Refills: 0 | DISCHARGE

## 2020-08-11 RX ORDER — FUROSEMIDE 40 MG
1 TABLET ORAL
Qty: 0 | Refills: 0 | DISCHARGE

## 2020-08-11 RX ORDER — COLLAGENASE CLOSTRIDIUM HIST. 250 UNIT/G
1 OINTMENT (GRAM) TOPICAL
Qty: 0 | Refills: 0 | DISCHARGE
Start: 2020-08-11

## 2020-08-11 RX ORDER — MYCOPHENOLATE MOFETIL 250 MG/1
2 CAPSULE ORAL
Qty: 0 | Refills: 0 | DISCHARGE
Start: 2020-08-11

## 2020-08-11 RX ORDER — FILGRASTIM 480MCG/1.6
480 VIAL (ML) INJECTION ONCE
Refills: 0 | Status: COMPLETED | OUTPATIENT
Start: 2020-08-11 | End: 2020-08-11

## 2020-08-11 RX ORDER — TRAMADOL HYDROCHLORIDE 50 MG/1
0.5 TABLET ORAL
Qty: 0 | Refills: 0 | DISCHARGE

## 2020-08-11 RX ORDER — INSULIN GLARGINE 100 [IU]/ML
6 INJECTION, SOLUTION SUBCUTANEOUS
Qty: 0 | Refills: 0 | DISCHARGE
Start: 2020-08-11

## 2020-08-11 RX ORDER — SPIRONOLACTONE 25 MG/1
1 TABLET, FILM COATED ORAL
Qty: 0 | Refills: 0 | DISCHARGE

## 2020-08-11 RX ORDER — VALGANCICLOVIR 450 MG/1
2 TABLET, FILM COATED ORAL
Qty: 0 | Refills: 0 | DISCHARGE
Start: 2020-08-11

## 2020-08-11 RX ORDER — PANTOPRAZOLE SODIUM 20 MG/1
1 TABLET, DELAYED RELEASE ORAL
Qty: 0 | Refills: 0 | DISCHARGE
Start: 2020-08-11

## 2020-08-11 RX ORDER — DIPHENHYDRAMINE HCL 50 MG
1 CAPSULE ORAL
Qty: 0 | Refills: 0 | DISCHARGE
Start: 2020-08-11

## 2020-08-11 RX ORDER — INSULIN GLARGINE 100 [IU]/ML
4 INJECTION, SOLUTION SUBCUTANEOUS
Qty: 0 | Refills: 0 | DISCHARGE

## 2020-08-11 RX ORDER — MAGNESIUM OXIDE 400 MG ORAL TABLET 241.3 MG
2 TABLET ORAL
Qty: 0 | Refills: 0 | DISCHARGE
Start: 2020-08-11

## 2020-08-11 RX ORDER — NYSTATIN CREAM 100000 [USP'U]/G
1 CREAM TOPICAL
Qty: 0 | Refills: 0 | DISCHARGE

## 2020-08-11 RX ORDER — INSULIN LISPRO 100/ML
3 VIAL (ML) SUBCUTANEOUS
Qty: 0 | Refills: 0 | DISCHARGE

## 2020-08-11 RX ORDER — AZTREONAM 2 G
1 VIAL (EA) INJECTION
Qty: 0 | Refills: 0 | DISCHARGE

## 2020-08-11 RX ORDER — PANTOPRAZOLE SODIUM 20 MG/1
1 TABLET, DELAYED RELEASE ORAL
Qty: 0 | Refills: 0 | DISCHARGE

## 2020-08-11 RX ADMIN — MAGNESIUM OXIDE 400 MG ORAL TABLET 800 MILLIGRAM(S): 241.3 TABLET ORAL at 17:04

## 2020-08-11 RX ADMIN — VALGANCICLOVIR 900 MILLIGRAM(S): 450 TABLET, FILM COATED ORAL at 11:53

## 2020-08-11 RX ADMIN — EVEROLIMUS 4.5 MILLIGRAM(S): 10 TABLET ORAL at 07:34

## 2020-08-11 RX ADMIN — Medication 1: at 17:04

## 2020-08-11 RX ADMIN — NYSTATIN CREAM 1 APPLICATION(S): 100000 CREAM TOPICAL at 06:12

## 2020-08-11 RX ADMIN — Medication 500000 UNIT(S): at 17:03

## 2020-08-11 RX ADMIN — Medication 6 UNIT(S): at 12:32

## 2020-08-11 RX ADMIN — CHLORHEXIDINE GLUCONATE 1 APPLICATION(S): 213 SOLUTION TOPICAL at 11:53

## 2020-08-11 RX ADMIN — PANTOPRAZOLE SODIUM 40 MILLIGRAM(S): 20 TABLET, DELAYED RELEASE ORAL at 06:12

## 2020-08-11 RX ADMIN — Medication 6 UNIT(S): at 17:04

## 2020-08-11 RX ADMIN — Medication 6 UNIT(S): at 08:45

## 2020-08-11 RX ADMIN — Medication 1: at 08:45

## 2020-08-11 RX ADMIN — Medication 1 APPLICATION(S): at 11:57

## 2020-08-11 RX ADMIN — Medication 3: at 12:32

## 2020-08-11 RX ADMIN — Medication 480 MICROGRAM(S): at 09:36

## 2020-08-11 RX ADMIN — MYCOPHENOLATE MOFETIL 500 MILLIGRAM(S): 250 CAPSULE ORAL at 07:35

## 2020-08-11 RX ADMIN — Medication 500000 UNIT(S): at 11:54

## 2020-08-11 RX ADMIN — Medication 1 TABLET(S): at 11:53

## 2020-08-11 RX ADMIN — Medication 5 MILLIGRAM(S): at 06:12

## 2020-08-11 RX ADMIN — Medication 81 MILLIGRAM(S): at 11:53

## 2020-08-11 RX ADMIN — NYSTATIN CREAM 1 APPLICATION(S): 100000 CREAM TOPICAL at 17:03

## 2020-08-11 RX ADMIN — Medication 500000 UNIT(S): at 06:12

## 2020-08-11 RX ADMIN — MAGNESIUM OXIDE 400 MG ORAL TABLET 800 MILLIGRAM(S): 241.3 TABLET ORAL at 07:36

## 2020-08-11 NOTE — PROGRESS NOTE ADULT - PROBLEM SELECTOR PROBLEM 5
ACP (advance care planning)
MISA (acute kidney injury)
Other heart failure

## 2020-08-11 NOTE — PROGRESS NOTE ADULT - MINUTES
30
50
50
55
25
30
35
25
26
30
35
35
45
30
40
45
25
30
35
45
25
60
45
45
30

## 2020-08-11 NOTE — PROGRESS NOTE ADULT - ATTENDING COMMENTS
Patient seen and discussed with the fellow.    Doing well. Liver tests normal.  Everolimus per level. Leukopenia worsening. Decreased MMF from 1gm BID to 500 mg BID a few days ago.  Neupogen x 1 now.  Pending discharge home soon.

## 2020-08-11 NOTE — PROGRESS NOTE ADULT - I WAS PHYSICALLY PRESENT FOR THE KEY PORTIONS OF THE EVALUATION AND MANAGEMENT (E/M) SERVICE PROVIDED.  I AGREE WITH THE ABOVE HISTORY, PHYSICAL, AND PLAN WHICH I HAVE REVIEWED AND EDITED WHERE APPROPRIATE

## 2020-08-11 NOTE — PROGRESS NOTE ADULT - ATTENDING COMMENTS
Patient seen and examined. Reviewed HSAT results from last night - severe SHENG with an AHI of 38.1/hour and associated with severe reductions in oxygenation, T<90% of 11.8%. Recommend APAP treatment. Will order through [facility] to supply APAP machine with supplies. Rx to be placed through [employer] and sent.

## 2020-08-11 NOTE — PROGRESS NOTE ADULT - ATTENDING COMMENTS
doing well  immunosuppression everolimus, mmf and steroids  making good progress wt PT  possible discharge today or tomorrow

## 2020-08-11 NOTE — PROGRESS NOTE ADULT - PROBLEM/PLAN-1
DISPLAY PLAN FREE TEXT

## 2020-08-11 NOTE — DISCHARGE NOTE NURSING/CASE MANAGEMENT/SOCIAL WORK - PATIENT PORTAL LINK FT
You can access the FollowMyHealth Patient Portal offered by [facility] by registering at the following website: [email or web address]. By joining [facility]’s FollowMyHealth portal, you will also be able to view your health information using other applications (apps) compatible with our system.

## 2020-08-11 NOTE — PROGRESS NOTE ADULT - REASON FOR ADMISSION
Liver failure, hepatic encephalopathy
JEAN cirrhosis/VRE UTI/GIB 2/2 GAVE--> liver failure-->s/p OLT
Liver failure, hepatic encephalopathy
PSE
Liver failure, hepatic encephalopathy

## 2020-08-11 NOTE — DISCHARGE NOTE NURSING/CASE MANAGEMENT/SOCIAL WORK - NSDCFUADDAPPT_GEN_ALL_CORE_FT
1)  Please call and come to the Transplant Clinic on Friday 8/14 for lab work.  2)  Please follow-up with Dr. Chris on Monday 8/17.  Our office phone #: [phone]  3)  Please contact and [facility] with [facility] for any questions or concerns about your sleep apnea diagnosis [phone].  4)  Please follow up with your primary care physician concerning your hospitalization   5)  Please follow up with podiatry for heal ulcer [phone].

## 2020-08-11 NOTE — PROGRESS NOTE ADULT - PROBLEM SELECTOR PROBLEM 1
Cirrhosis
MISA (acute kidney injury)
Type 2 diabetes, uncontrolled, with ulcer of heel
Anemia, unspecified type
Cirrhosis
MISA (acute kidney injury)
Type 2 diabetes, uncontrolled, with ulcer of heel
UTI (urinary tract infection)
MISA (acute kidney injury)
Type 2 diabetes, uncontrolled, with ulcer of heel
Cirrhosis
Cirrhosis
UTI (urinary tract infection)
UTI (urinary tract infection)

## 2020-08-11 NOTE — PROGRESS NOTE ADULT - SUBJECTIVE AND OBJECTIVE BOX
Chief Complaint:  Patient is a 64y old  Male who presents with a chief complaint of Liver failure, hepatic encephalopathy (11 Aug 2020 08:50)    Reason for consult: s/p OLT    Interval Events: Sleep study shows multiple episodes of sleep apnea.     Hospital Medications:  artificial  tears Solution 1 Drop(s) Both EYES two times a day PRN  aspirin enteric coated 81 milliGRAM(s) Oral daily  chlorhexidine 2% Cloths 1 Application(s) Topical <User Schedule>  collagenase Ointment 1 Application(s) Topical daily  diphenhydrAMINE 25 milliGRAM(s) Oral <User Schedule> PRN  everolimus (ZORTRESS) 4.5 milliGRAM(s) Oral <User Schedule>  insulin glargine Injectable (LANTUS) 6 Unit(s) SubCutaneous at bedtime  insulin lispro (HumaLOG) corrective regimen sliding scale   SubCutaneous Before meals and at bedtime  insulin lispro Injectable (HumaLOG) 6 Unit(s) SubCutaneous three times a day before meals  magnesium oxide 800 milliGRAM(s) Oral two times a day with meals  mycophenolate mofetil 500 milliGRAM(s) Oral two times a day  nystatin    Suspension 025565 Unit(s) Oral four times a day  nystatin Cream 1 Application(s) Topical two times a day  pantoprazole    Tablet 40 milliGRAM(s) Oral before breakfast  predniSONE   Tablet 5 milliGRAM(s) Oral daily  tamsulosin 0.8 milliGRAM(s) Oral at bedtime  trimethoprim   80 mG/sulfamethoxazole 400 mG 1 Tablet(s) Oral daily  valGANciclovir 900 milliGRAM(s) Oral daily      ROS:   General:  No  fevers, chills, night sweats, fatigue  Eyes:  Good vision, no reported pain  ENT:  No sore throat, pain, runny nose  CV:  No pain, palpitations  Pulm:  No dyspnea, cough  GI:  See HPI, otherwise negative  :  No  incontinence, nocturia  Muscle:  No pain, weakness  Neuro:  No memory problems  Psych:  No insomnia, mood problems, depression  Endocrine:  No polyuria, polydipsia, cold/heat intolerance  Heme:  No petechiae, ecchymosis, easy bruisability  Skin:  No rash    PHYSICAL EXAM:   Vital Signs:  Vital Signs Last 24 Hrs  T(C): 36.9 (11 Aug 2020 09:00), Max: 37 (10 Aug 2020 13:57)  T(F): 98.4 (11 Aug 2020 09:00), Max: 98.6 (10 Aug 2020 13:57)  HR: 90 (11 Aug 2020 09:) (84 - 90)  BP: 120/58 (11 Aug 2020 09:) (120/58 - 128/64)  BP(mean): --  RR: 20 (11 Aug 2020 09:) (18 - 20)  SpO2: 97% (11 Aug 2020 09:) (97% - 99%)  Daily     Daily Weight in k.7 (11 Aug 2020 06:10)    GENERAL: no acute distress  NEURO: alert, no asterixis  HEENT: anicteric sclera, no conjunctival pallor appreciated  CHEST: no respiratory distress, no accessory muscle use  CARDIAC: regular rate, rhythm  ABDOMEN: soft, non-tender, non-distended, no rebound or guarding  EXTREMITIES: warm, well perfused, no edema  SKIN: no lesions noted    LABS: reviewed                        8.3    2.01  )-----------( 107      ( 11 Aug 2020 06:13 )             25.0     08-11    140  |  105  |  30<H>  ----------------------------<  198<H>  4.4   |  24  |  0.96    Ca    9.1      11 Aug 2020 06:13  Phos  3.0     08-11  Mg     1.8     08-11    TPro  5.4<L>  /  Alb  3.4  /  TBili  0.3  /  DBili  0.1  /  AST  18  /  ALT  25  /  AlkPhos  109  08-11    LIVER FUNCTIONS - ( 11 Aug 2020 06:13 )  Alb: 3.4 g/dL / Pro: 5.4 g/dL / ALK PHOS: 109 U/L / ALT: 25 U/L / AST: 18 U/L / GGT: x             Interval Diagnostic Studies: see sunrise for full report

## 2020-08-11 NOTE — PROGRESS NOTE ADULT - SUBJECTIVE AND OBJECTIVE BOX
Follow Up:      Interval History:    REVIEW OF SYSTEMS  [  ] ROS unobtainable because:    [  ] All other systems negative except as noted below    Constitutional:  [ ] fever [ ] chills  [ ] weight loss  [ ] weakness  Skin:  [ ] rash [ ] phlebitis	  Eyes: [ ] icterus [ ] pain  [ ] discharge	  ENMT: [ ] sore throat  [ ] thrush [ ] ulcers [ ] exudates  Respiratory: [ ] dyspnea [ ] hemoptysis [ ] cough [ ] sputum	  Cardiovascular:  [ ] chest pain [ ] palpitations [ ] edema	  Gastrointestinal:  [ ] nausea [ ] vomiting [ ] diarrhea [ ] constipation [ ] pain	  Genitourinary:  [ ] dysuria [ ] frequency [ ] hematuria [ ] discharge [ ] flank pain  [ ] incontinence  Musculoskeletal:  [ ] myalgias [ ] arthralgias [ ] arthritis  [ ] back pain  Neurological:  [ ] headache [ ] seizures  [ ] confusion/altered mental status    Allergies  codeine (Anaphylaxis)        ANTIMICROBIALS:  nystatin    Suspension [id number] four times a day  trimethoprim   80 mG/sulfamethoxazole 400 mG 1 daily  valGANciclovir 900 daily      OTHER MEDS:  MEDICATIONS  (STANDING):  aspirin enteric coated 81 daily  diphenhydrAMINE 25 <User Schedule> PRN  everolimus (ZORTRESS) 4.5 <User Schedule>  insulin glargine Injectable (LANTUS) 6 at bedtime  insulin lispro (HumaLOG) corrective regimen sliding scale  Before meals and at bedtime  insulin lispro Injectable (HumaLOG) 6 three times a day before meals  mycophenolate mofetil 500 two times a day  pantoprazole    Tablet 40 before breakfast  predniSONE   Tablet 5 daily  tamsulosin 0.8 at bedtime      Vital Signs Last 24 Hrs  T(C): 36.4 (11 Aug 2020 17:00), Max: 36.9 (11 Aug 2020 09:00)  T(F): 97.6 (11 Aug 2020 17:00), Max: 98.4 (11 Aug 2020 09:00)  HR: 80 (11 Aug 2020 17:00) (79 - 90)  BP: 125/64 (11 Aug 2020 17:00) (120/58 - 128/64)  BP(mean): 90 (11 Aug 2020 17:00) (90 - 90)  RR: 18 (11 Aug 2020 17:00) (18 - 20)  SpO2: 100% (11 Aug 2020 17:00) (97% - 100%)    PHYSICAL EXAMINATION:  General: Alert and Awake, NAD  HEENT: PERRL, EOMI  Neck: Supple  Cardiac: RRR, No M/R/G  Resp: CTAB, No Wh/Rh/Ra  Abdomen: NBS, NT/ND, No HSM, No rigidity or guarding  MSK: No LE edema. No Calf tenderness  : No bob  Skin: No rashes or lesions. Skin is warm and dry to the touch.   Neuro: Alert and Awake. CN 2-12 Grossly intact. Moves all four extremities spontaneously.  Psych: Calm, Pleasant, Cooperative                          8.3    2.01  )-----------( 107      ( 11 Aug 2020 06:13 )             25.0       08-11    140  |  105  |  30<H>  ----------------------------<  198<H>  4.4   |  24  |  0.96    Ca    9.1      11 Aug 2020 06:13  Phos  3.0     08-11  Mg     1.8     08-11    TPro  5.4<L>  /  Alb  3.4  /  TBili  0.3  /  DBili  0.1  /  AST  18  /  ALT  25  /  AlkPhos  109  08-11          MICROBIOLOGY:  v  .Blood Blood  07-20-20   No Growth Final  --  --      .Urine Clean Catch (Midstream)  07-20-20   No growth  --  --      .Body Fluid Abdominal Fluid  07-14-20   No growth at 5 days  --    polymorphonuclear leukocytes seen  No organisms seen  by cytocentrifuge        CMV IgG Antibody: <0.20 U/mL (12-04-19 @ 08:33)  Toxoplasma IgG Screen: <3.0 IU/mL (12-04-19 @ 08:33)          RADIOLOGY:    <The imaging below has been reviewed and visualized by me independently. Findings as detailed in report below> Follow Up:  s/p olt, encephalopathy    Interval History: afebrile. denies abdominal pain. denies n/v/d     REVIEW OF SYSTEMS  [  ] ROS unobtainable because:    [x  ] All other systems negative except as noted below    Constitutional:  [ ] fever [ ] chills  [ ] weight loss  [ ] weakness  Skin:  [ ] rash [ ] phlebitis	  Eyes: [ ] icterus [ ] pain  [ ] discharge	  ENMT: [ ] sore throat  [ ] thrush [ ] ulcers [ ] exudates  Respiratory: [ ] dyspnea [ ] hemoptysis [ ] cough [ ] sputum	  Cardiovascular:  [ ] chest pain [ ] palpitations [ ] edema	  Gastrointestinal:  [ ] nausea [ ] vomiting [ ] diarrhea [ ] constipation [ ] pain	  Genitourinary:  [ ] dysuria [ ] frequency [ ] hematuria [ ] discharge [ ] flank pain  [ ] incontinence  Musculoskeletal:  [ ] myalgias [ ] arthralgias [ ] arthritis  [ ] back pain  Neurological:  [ ] headache [ ] seizures  [ ] confusion/altered mental status    Allergies  codeine (Anaphylaxis)        ANTIMICROBIALS:  nystatin    Suspension [id number] four times a day  trimethoprim   80 mG/sulfamethoxazole 400 mG 1 daily  valGANciclovir 900 daily      OTHER MEDS:  MEDICATIONS  (STANDING):  aspirin enteric coated 81 daily  diphenhydrAMINE 25 <User Schedule> PRN  everolimus (ZORTRESS) 4.5 <User Schedule>  insulin glargine Injectable (LANTUS) 6 at bedtime  insulin lispro (HumaLOG) corrective regimen sliding scale  Before meals and at bedtime  insulin lispro Injectable (HumaLOG) 6 three times a day before meals  mycophenolate mofetil 500 two times a day  pantoprazole    Tablet 40 before breakfast  predniSONE   Tablet 5 daily  tamsulosin 0.8 at bedtime      Vital Signs Last 24 Hrs  T(C): 36.4 (11 Aug 2020 17:00), Max: 36.9 (11 Aug 2020 09:00)  T(F): 97.6 (11 Aug 2020 17:00), Max: 98.4 (11 Aug 2020 09:00)  HR: 80 (11 Aug 2020 17:00) (79 - 90)  BP: 125/64 (11 Aug 2020 17:00) (120/58 - 128/64)  BP(mean): 90 (11 Aug 2020 17:00) (90 - 90)  RR: 18 (11 Aug 2020 17:00) (18 - 20)  SpO2: 100% (11 Aug 2020 17:00) (97% - 100%)    PHYSICAL EXAMINATION:  General: Alert and Awake, NAD  HEENT: PERRL, EOMI  Neck: Supple  Cardiac: RRR, No M/R/G  Resp: CTAB, No Wh/Rh/Ra  Abdomen: Surgical site from OLT is clean, without erythema and without drainage. NBS, NT/ND, No HSM, No rigidity or guarding  MSK: No LE edema. No Calf tenderness  : No bob  Skin: No rashes or lesions. Skin is warm and dry to the touch.   Neuro: Alert and Awake. CN 2-12 Grossly intact. Moves all four extremities spontaneously.  Psych: Calm, Pleasant, Cooperative                        8.3    2.01  )-----------( 107      ( 11 Aug 2020 06:13 )             25.0       08-11    140  |  105  |  30<H>  ----------------------------<  198<H>  4.4   |  24  |  0.96    Ca    9.1      11 Aug 2020 06:13  Phos  3.0     08-11  Mg     1.8     08-11    TPro  5.4<L>  /  Alb  3.4  /  TBili  0.3  /  DBili  0.1  /  AST  18  /  ALT  25  /  AlkPhos  109  08-11          MICROBIOLOGY:  v  .Blood Blood  07-20-20   No Growth Final  --  --      .Urine Clean Catch (Midstream)  07-20-20   No growth  --  --      .Body Fluid Abdominal Fluid  07-14-20   No growth at 5 days  --    polymorphonuclear leukocytes seen  No organisms seen  by cytocentrifuge        CMV IgG Antibody: <0.20 U/mL (12-04-19 @ 08:33)  Toxoplasma IgG Screen: <3.0 IU/mL (12-04-19 @ 08:33)          RADIOLOGY:    <The imaging below has been reviewed and visualized by me independently. Findings as detailed in report below>    EXAM:  CT BRAIN                        PROCEDURE DATE:  07/31/2020    Slight progression to atrophic changes compared with 6/11/2020 which may be the result of treatment-related changes. No other significant pathology

## 2020-08-11 NOTE — PROGRESS NOTE ADULT - PROBLEM SELECTOR PLAN 4
Advanced care planning was discussed with patient and family.  Advanced care planning forms were reviewed and discussed.  Risks, benefits and alternatives of gastroenterologic procedures were discussed in detail and all questions were answered.    30 minutes spent.
- ID following  - Linezolid and meropenem course completed
Advanced care planning was discussed with patient and family.  Advanced care planning forms were reviewed and discussed.  Risks, benefits and alternatives of gastroenterologic procedures were discussed in detail and all questions were answered.    30 minutes spent.
Decompensated cirrhosis due to JEAN, MELD-Na 30 (6/15)  - pt on liver transplant list  - EGD in 12/19 showed small varices and GAVE  - POCUS in ED with no pocket amenable to safely perform paracentesis
Decompensated cirrhosis due to JEAN, MELD-Na 30 (6/15)  - pt on liver transplant list  - EGD in 12/19 showed small varices and GAVE  - POCUS in ED with no pocket amenable to safely perform paracentesis
Decompensated cirrhosis due to JEAN, MELD-Na 35  - pt on liver transplant list  - EGD in 12/19 showed small varices and GAVE  - POCUS in ED with no pocket amenable to safely perform paracentesis
Decompensated cirrhosis due to JEAN, MELD-Na 35  - pt on liver transplant list  - EGD in 12/19 showed small varices and GAVE  - POCUS in ED with no pocket amenable to safely perform paracentesis
Decompensated cirrhosis due to JEAN, MELD-Na 35  - pt on liver transplant list  - EGD in 12/19 showed small varices and GAVE  - POCUS in ED with no pocket amenable to safely perform paracentesis  - MELD labs daily.  - OOBTC daily with incentive spirometer.   - Will call PT for daily PT to optimize patient's functional status.
MISA likely in setting of poor PO intake and hypovolemia   - albumin 25% 100ml Q6  - hold diuretics.  - f/u Bladder scan for PVR.   - C/w home midodrine.
SCr stable at 1.68.  - net negative 1.3L  - continue to hold diuretics  - bob in place  - continue to monitor BMP
SCr stable at 1.78.  - net negative 750cc  - continue to hold diuretics  - bob in place  - continue to monitor BMP
Worsened despite Albumin, stable at 1.7 since yesterday. Nephrology to evaluate. Beasley placed today per Nephrology request.
Worsening despite Albumin. Nephrology to evaluate.

## 2020-08-11 NOTE — PROGRESS NOTE ADULT - PROBLEM SELECTOR PROBLEM 2
Anemia
Anemia
Electrolyte and fluid disorder
Anemia
Essential hypertension
Liver failure
Anemia
Essential hypertension
Hepatic encephalopathy
Hyperkalemia
Liver failure
Mixed hyperlipidemia
Mixed hyperlipidemia
UTI (urinary tract infection)
Essential hypertension
Mixed hyperlipidemia
Anemia
Hepatic encephalopathy
Hepatic encephalopathy

## 2020-08-11 NOTE — PROGRESS NOTE ADULT - NSREFPHYEXINPTDOCREFER_GEN_ALL_CORE
as above
>4

## 2020-08-11 NOTE — PROGRESS NOTE ADULT - ASSESSMENT
65 y/o M PMHx decompensated JEAN Cirrhosis on transplant list, DMII, HFpEF, recent admission for ESBL E coli prostatic abscess 2/2 4 wks ertapenem, hx of ESBL UTIs with prostate abscess s/p IV abx, recent VRE urinary colonization came to hospital for worsening jaundice and episode of confusion, resolved PTA. s/p treatment course for possible VRE UTI. Now s/p OLT on 7/2/20. Course complicated by Encephalopathy and Tremors. Blood Cultures (7/10) with VRE sensitive to Linezolid. CT C/A/P without obvious intraabdominal source. Hypoxia prior to positive BCx which may be related to sepsis CT Chest (7/11) without evidence of pneumonia. Had L groin fluid collection which appears to be consistent with seroma - sample obtained for culture also negative. No drainage, erythema or underlying fluctuance was appreciated at the right heel. MRI prior to transplant without obvious infection. Transferred from SICU to [facility] on 7/17/2020.    RECOMMENDATIONS:    #Leukopenia  - Continue to monitor counts - slight decrease today  - Bactrim and Valcyte could be contributing. With re to Valcyte patient is high risk i.e. D+/R-. With re to Bactrim it is also serving as UTI PPx given recurrent ESBL e coli UTI and prostate abscess prior to transplant    #Encephalopathy (improving)  - Switched to Everolimus on 7/27 to avoid CNI CNS Toxicity   - CT result(7/31): Slight progression to atrophic changes compared with 6/11/2020 which may be the result of treatment-related changes. No other significant pathology  - Continue to monitor    #VRE Bacteremia, L Groin Collection, Encephalopathy (improving)  - s/p Linezolid and Daptomycin ended on 7/26 for VRE bacteremia post-OLT  - Abd US showed 9cm Left groin collection: likely represents a seroma or lymphocele; aspirated - cx: PMN seen, no organism seen  - Continue monitoring cylosporine levels  - TTE negative for endocarditis  - UA/UC on 7/20 no UTI; UA on 7/30 negative  - BCx on 7/20 NGTD    #s/p OLT   - Continue bactrim, valcyte (CMV D+/R-) for prophylaxis  - Monitor patient for tremors - was on tacrolimus(CI), then cylosporine(CI), now on Everolimus(mTor I)    #Right heel wound  --Podiatry on board    --Local wound care     I will be away tomorrow. Please contact the Infectious Diseases Office to contact the covering Infectious Diseases Attending.     Eusebio Pérez M.D.  [facility] Division of Infectious Disease  8AM-5PM: Pager Number [phone]  After Hours (or if no response): Please contact the Infectious Diseases Office at [phone]     The above assessment and plan were discussed with [facility] NP

## 2020-08-11 NOTE — PROGRESS NOTE ADULT - NSHPATTENDINGPLANDISCUSS_GEN_ALL_CORE
ICU transplant team
Dr. Gray
PA
liver transplant / ICU team
Dr. Hilario, Transplant [facility]
Patient seen on multidisciplinary rounds with Transplant surgeons, hepatologist, NP, pharmacist, and nurse.
Patient seen on multidisciplinary rounds with Transplant surgeons, nephrologist, NP/PA, GI Fellow, pharmacist, and nurse.
hepatologist, PA, nurse, SICU staff
hepatology, NP, pharmacist, nurse, resident
patient
patient and SICU
patient and SICU team
patient at bedside
patient, transplant team and nursing team
transplant team
txp team, [facility] nurses
hepatologist, PA, nurse, SICU staff
Patient seen on multidisciplinary rounds with Transplant surgeons, nephrologist, hepatologist, PA, pharmacist, and nurse.
patient and transplant team
patient and multdisciplinary care team
NP, nurse, patient
Transplant team
House staff, Liver team
House staff, hepatology
ICU team, House staff, Transplant team
Transplant team
House staff, hepatology
med np
OLY Hines
OLY Hines
ZBIGNIEW Adames
ZBIGNIEW Mackenzie
ZBIGNIEW Martínez
OLY Hines

## 2020-08-11 NOTE — PROGRESS NOTE ADULT - SUBJECTIVE AND OBJECTIVE BOX
TRANSPLANT SURGERY MULTIDISCIPLINARY NOTE    OLTx      Date:  7/2/2020       POD #39    Present:   Patient seen and examined with multidisciplinary team including Transplant Surgeon: Dr. Lucio, [facility] in am rounds with Dr. Lucio. Disciplines not in attendance will be notified of the plan.    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     Re-admitted on 6/11:   ·	worsening HE  ·	UTI/VRE: tx with linezolid 6/15-22, bactrim DS 6/22 - 7/2  ·	MISA/Hyponatremia  ·	UGI bleed (melena) s/p EGD (6/22) c/w hemorrhagic duodenitis/gastritis; Grade 2 EV; requiring multiple transfusions  ·	Known h/o R foot ulcer (MRI neg for OM)  ·	s/p OLTx on 7/2/2020, post op liver doppler with patent vessels  ·	post op course c/b VRE bacteremia (7/10), tx with linezolid 7/10-7/23, dapto 7/23-7/26  ·	post op course c/b delirium likely in setting of CNI toxicity; CT head neg (7/31); off FK 7/19, off cylco 7/27. Everolimus initiated 7/27   ·	sleep study performed  8/10    Interval events:  - no overnight events; afebrile, vitals stable.   - in pt sleep study performed overnight, and evaluated   - Awake and alert this AM. Appropriate and conversant  - Stable liver function tests  - Tolerating PO intake, no N/V  - WBC still downtrending, 2.0 <- 2.3 <-  2.5.       Potential Discharge date: pending clinical improvement    Education:  Medications    Plan of care:  See Below          MEDICATIONS  (STANDING):  aspirin enteric coated 81 milliGRAM(s) Oral daily  chlorhexidine 2% Cloths 1 Application(s) Topical <User Schedule>  collagenase Ointment 1 Application(s) Topical daily  everolimus (ZORTRESS) 4.5 milliGRAM(s) Oral <User Schedule>  insulin glargine Injectable (LANTUS) 6 Unit(s) SubCutaneous at bedtime  insulin lispro (HumaLOG) corrective regimen sliding scale   SubCutaneous Before meals and at bedtime  insulin lispro Injectable (HumaLOG) 6 Unit(s) SubCutaneous three times a day before meals  magnesium oxide 800 milliGRAM(s) Oral two times a day with meals  mycophenolate mofetil 500 milliGRAM(s) Oral two times a day  nystatin    Suspension 705472 Unit(s) Oral four times a day  nystatin Cream 1 Application(s) Topical two times a day  pantoprazole    Tablet 40 milliGRAM(s) Oral before breakfast  predniSONE   Tablet 5 milliGRAM(s) Oral daily  tamsulosin 0.8 milliGRAM(s) Oral at bedtime  trimethoprim   80 mG/sulfamethoxazole 400 mG 1 Tablet(s) Oral daily  valGANciclovir 900 milliGRAM(s) Oral daily    MEDICATIONS  (PRN):  artificial  tears Solution 1 Drop(s) Both EYES two times a day PRN Dry Eyes  diphenhydrAMINE 25 milliGRAM(s) Oral <User Schedule> PRN Insomnia      PAST MEDICAL & SURGICAL HISTORY:  GIB (gastrointestinal bleeding)  GERD with esophagitis: Gastritis &amp; Non Bleeding Ulcers  Hepatic encephalopathy  Obesity  Fatty liver disease, nonalcoholic  Renal stones: 25 years ago  Hypertension  Neuropathy  Hypercholesteremia  Diabetes  S/P cholecystectomy      Vital Signs Last 24 Hrs  T(C): 36.9 (11 Aug 2020 09:00), Max: 37 (10 Aug 2020 13:57)  T(F): 98.4 (11 Aug 2020 09:00), Max: 98.6 (10 Aug 2020 13:57)  HR: 90 (11 Aug 2020 09:00) (84 - 90)  BP: 120/58 (11 Aug 2020 09:00) (120/58 - 128/64)  BP(mean): --  RR: 20 (11 Aug 2020 09:00) (18 - 20)  SpO2: 97% (11 Aug 2020 09:00) (97% - 99%)    I&O's Summary    10 Aug 2020 07:01  -  11 Aug 2020 07:00  --------------------------------------------------------  IN: 1400 mL / OUT: 2375 mL / NET: -975 mL    11 Aug 2020 07:01  -  11 Aug 2020 12:27  --------------------------------------------------------  IN: 480 mL / OUT: 250 mL / NET: 230 mL          LABS:                        8.3    2.01  )-----------( 107      ( 11 Aug 2020 06:13 )             25.0     08-11    140  |  105  |  30<H>  ----------------------------<  198<H>  4.4   |  24  |  0.96    Ca    9.1      11 Aug 2020 06:13  Phos  3.0     08-11  Mg     1.8     08-11    TPro  5.4<L>  /  Alb  3.4  /  TBili  0.3  /  DBili  0.1  /  AST  18  /  ALT  25  /  AlkPhos  109  08-11    PT/INR - ( 10 Aug 2020 06:04 )   PT: 12.5 sec;   INR: 1.05 ratio         PTT - ( 10 Aug 2020 06:04 )  PTT:29.6 sec    CAPILLARY BLOOD GLUCOSE      POCT Blood Glucose.: 200 mg/dL (11 Aug 2020 08:43)  POCT Blood Glucose.: 255 mg/dL (10 Aug 2020 21:19)  POCT Blood Glucose.: 173 mg/dL (10 Aug 2020 17:03)    LIVER FUNCTIONS - ( 11 Aug 2020 06:13 )  Alb: 3.4 g/dL / Pro: 5.4 g/dL / ALK PHOS: 109 U/L / ALT: 25 U/L / AST: 18 U/L / GGT: x                 Cultures:      Review of symptoms:   Gen: no weight changes  Skin: no rashes,   Head/Eyes/Ears/Mouth: no headache, normal hearing, no changes to vision  Respiratory: no dyspnea, cough  CV: No chest pain, PND, orthopnea  GI: stool incontinence  : no pain on urination  MSK: no joint swelling  Neuro: No dizziness  Heme: no easy bruising or bleeding   Endo: no heat/cold intolerance  Psych: no significant nervousness, anxiety, depression    Physical Exam  Constitutional: NAD/WD/WN  Eyes: Anicteric, PERRLA  ENMT: nc/at. no thrush or ulcerations appreciated  Neck: no JVD, supple  Respiratory: CTA B/L, unlabored breathing   Cardiovascular: RRR  Gastrointestinal: Soft abdomen, non tender to touch at surgical site, ND  Genitourinary: Voiding spontaneously  Extremities: SCD's in place and working bilaterally  Vascular: Palpable dp pulses bilaterally  Neurological: A&O x3, following commands. Very pleasant  Musculoskeletal: Moving all extremities  Skin: stage 2 on sacrum- healing, heel wound healing- wound care following   Psychiatric: Responsive

## 2020-08-11 NOTE — PROGRESS NOTE ADULT - ASSESSMENT
63 y/o male with PMH of decompensated JEAN cirrhosis, GAVE, obesity, DM, told in past he has SHENG, now s/p orthotopic liver transplant on 2020.  Post transplant, while in a monitored setting noted to have witnessed episodes of apneic episodes while sleeping, associated with (desaturations (including when using plain NC). Primary team started him on CPAP 12 cm H2O in SICU post op. Reports non-restorative sleep, frequent nighttime awakenings to urinate, dry mouth in the am. Reports sleep studies that were done in the past that were normal. Pulmonary consulted for assistance with obtaining CPAP machine on discharge.     - HSAT shows the patient has moderate sleep apnea with 27.9 events per hour   - Patient will most likely need an APAP machine for home use   - will follow up with the sleep clinic on discharge   - Please email: [email or web address] to setup an appointment prior to discharge. Include the patient's name, , MRN and contact information in the email.      Pulmonary/Sleep Clinic  [address] [place] [address]  [phone] 63 y/o male with PMH of decompensated JEAN cirrhosis, GAVE, obesity, DM, told in past he has SHENG, now s/p orthotopic liver transplant on 2020.  Post transplant, while in a monitored setting noted to have witnessed episodes of apneic episodes while sleeping, associated with (desaturations (including when using plain NC). Primary team started him on CPAP 12 cm H2O in SICU post op. Reports non-restorative sleep, frequent nighttime awakenings to urinate, dry mouth in the am. Reports sleep studies that were done in the past that were normal. Pulmonary consulted for assistance with obtaining CPAP machine on discharge.     - HSAT shows the patient has severe sleep apnea with 32 events per hour   - Patient will most likely need an APAP machine for home use   - will follow up with the sleep clinic on discharge   - Please email: [email or web address] to setup an appointment prior to discharge. Include the patient's name, , MRN and contact information in the email.      Pulmonary/Sleep Clinic  [address] [place] [address]  [phone]

## 2020-08-11 NOTE — PROGRESS NOTE ADULT - COVID-19 NEGATIVE LAB RESULT
COVID-19 ruled out

## 2020-08-11 NOTE — PROGRESS NOTE ADULT - ASSESSMENT
64 M hx of DM, HLD, GERD, HFpEF with mild LV diastolic dysfunction, and decompensated JEAN cirrhosis c/b ascites with hx of SBP, HE, duodenal ulcer, GAVE and duodenal AVM s/p APC (last on 10/11/19), s/p liver transplant this admission (7/2/20). Post-op course c/b tacrolimus toxicity and hospital delerium, improving after stopping CNI and replacing w/ everolimus.     Impression:  #S/p liver transplant 7/2: liver enzymes normalized.   OR details:  - L groin cutdown for vv bypass   - anastomosis: bicaval end-end/CBD duct-duct/HA & PV end-end, all standard anatomy.    - IOC with good flow  - Simulect induction. 500mg Solumedrol  - JPs x3 with T-Tube  - 14U pRBC, 14U FFP, 10 PLT, 11 CRYO, 500mL returned from cell saver, EBL 5L, UOP 1100mL  Recipient Info:   ABO: A  CMV:  Neg  EBV Positive  Donor:  Donor ID:  [id number] Match: [id number]  Age: 29 ABO:  A1 High Risk:   No COD: Cardiovascular  Anti CMV positive, EBV IgG- positive  HepBcAb-neg, Hepatitis C-DANA- neg, Hepatitis C ab-neg    #Halluciniations/agitation/AMS/tremors – due to CNI toxicity, improving now that CNI discontinued and replaced with everolimus  #Sacral decubitus ulcer: stage I, does not appear infected  #R posterior heel wound w/ overlying eschar –Local wound care. no signs of infections, XR neg for gas, evaluated by podiatry and ID. MRI w/o contrast limited, but no overt signs of active infection.  #GI bleed: Resolved. Hgb stable with no overt bleeding post-transplant. 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 GAVE and PHG likely improved pos ttransplant.  #ESBL UTI with h/o prostatic abscess: Resolved with IV abx  #VRE bacteremia: Resolved with Linezolid and meropenem     Recommendation:  - outpt f/u in sleep clinic as per pulm  - continue with everolimus dosed per Transplant surgery, f/u daily levels (currently 4.5mg BID)  - continue Cellcept 500 mg BID as per Transplant surgery  - continue prednisone 5 mg PO daily  - continue w/ PPI daily for GI prophylaxis   - frequent reorientation and visitation, normalized sleep/wake cycle environment to mitigate hospital delerium    - continue nystatin, Bactrim and Valcyte ppx  - physical therapy daily/BID  - continue with mag oxide 200 mg BID  - c/w ASA 81mg daily  - continue to hold diuretics   - outpatient follow up with Dr. Medina within 1-2 weeks post discharge (already being arranged by liver clinic [phone])    Ze Dominguez  Gastroenterology Fellow  AT NIGHT AND ON WEEKENDS:  Contact on-call GI fellow via answering service ([phone]) from 5pm-8am and on weekends/holidays  MONDAY-FRIDAY 8AM-5PM:  Available via [employer] Teams  Call [phone] ([facility]) or Page [phone] ([facility]) from 8am-5pm M-F

## 2020-08-11 NOTE — PROGRESS NOTE ADULT - PROBLEM SELECTOR PROBLEM 3
VRE bacteremia
Immunosuppressed status
Anemia
VRE bacteremia
ACP (advance care planning)
Anemia
Encephalopathy
Mixed hyperlipidemia
VRE bacteremia
Essential hypertension
Essential hypertension
Hepatic encephalopathy
Mixed hyperlipidemia
Subacute liver failure without hepatic coma
VRE bacteremia
Essential hypertension
Mixed hyperlipidemia
VRE bacteremia
Subacute liver failure without hepatic coma
Subacute liver failure without hepatic coma

## 2020-08-11 NOTE — PROGRESS NOTE ADULT - ASSESSMENT
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 c/b VRE bactermia and delirium    [] POD 39 s/p OLT   - Good graft function, LFTs stable   - Immuno:  Everolimus 4.5 mg bid, Pred 5, MMF to 500 BID give leukopenia     -->Tacro DC'd 7/19. Cyclosporine DC'd 7/27.  Changed to Everolimus on 7/27 due to persistent delirium/AMS, now with improvement     -->Daily Everolimus level  - PPX: Valcyte/Nystatin/Bactrim  - Mental status significantly improved.  CT Head negative  - Diet: Regular, tolerating, PO intake improving  - Continue ASA 81mg daily  - h/o UGI bleed: Protonix 40mg daily  - DVT PPx: SCDs at all times  - Sacral decub: seen by wound care, recs appreciated. Healing well.   - Pain control PRN  - keep Mag >2  - Check lipid panel prior to d/c home.   - sleep study performed- Dx severe SHENG pulm recs Apap- social work to arrange for out pt machine  - zarxio 480 mg x 1 for downtrending wbc       [] VRE bacteremia (7/10)  - tx with linezolid 7/10-7/23,  Daptomycin 7/23-7/26   - BCx from 7/20 with NG, UCx (7/20) with NG     [] DM  - Appreciate Endocrinology recommendations  - Continue lantus 6u qhs and ISS  - Resume premeal insulin 6 U Humalog  - Fingersticks ac and qhs    [] Heel ulcer  - Healing well.   - Appreciate podiatry recommendations    [] Dispo:    - continue 1:1 overnight (from 11 PM and 7AM).   - expect d/c home with home PT, potentially this week  - daily PT/OT (team aware)  - discharge planning in progress w/ Liver transplant

## 2020-08-12 LAB
MISCELLANEOUS TEST NAME: SIGNIFICANT CHANGE UP
MISCELLANEOUS, NORMALS: SIGNIFICANT CHANGE UP
MISCELLANEOUS, RESULT: SIGNIFICANT CHANGE UP

## 2020-08-14 ENCOUNTER — APPOINTMENT (OUTPATIENT)
Dept: TRANSPLANT | Facility: CLINIC | Age: 65
End: 2020-08-14

## 2020-08-14 ENCOUNTER — LABORATORY RESULT (OUTPATIENT)
Age: 65
End: 2020-08-14

## 2020-08-14 LAB
ALBUMIN SERPL ELPH-MCNC: 3.6 G/DL
ALP BLD-CCNC: 118 U/L
ALT SERPL-CCNC: 27 U/L
ANION GAP SERPL CALC-SCNC: 9 MMOL/L
AST SERPL-CCNC: 18 U/L
BASOPHILS # BLD AUTO: 0.05 K/UL
BASOPHILS NFR BLD AUTO: 1.6 %
BILIRUB SERPL-MCNC: 0.4 MG/DL
BUN SERPL-MCNC: 36 MG/DL
CALCIUM SERPL-MCNC: 8.9 MG/DL
CHLORIDE SERPL-SCNC: 104 MMOL/L
CO2 SERPL-SCNC: 25 MMOL/L
CREAT SERPL-MCNC: 1.35 MG/DL
EOSINOPHIL # BLD AUTO: 0.31 K/UL
EOSINOPHIL NFR BLD AUTO: 10.5 %
GGT SERPL-CCNC: 29 U/L
GLUCOSE SERPL-MCNC: 178 MG/DL
HCT VFR BLD CALC: 25.7 %
HGB BLD-MCNC: 8.2 G/DL
INR PPP: 1.08 RATIO
LYMPHOCYTES # BLD AUTO: 0.29 K/UL
LYMPHOCYTES NFR BLD AUTO: 9.7 %
MAGNESIUM SERPL-MCNC: 1.8 MG/DL
MAN DIFF?: NORMAL
MCHC RBC-ENTMCNC: 31.5 PG
MCHC RBC-ENTMCNC: 31.9 GM/DL
MCV RBC AUTO: 98.8 FL
MONOCYTES # BLD AUTO: 0.16 K/UL
MONOCYTES NFR BLD AUTO: 5.6 %
NEUTROPHILS # BLD AUTO: 2.13 K/UL
NEUTROPHILS NFR BLD AUTO: 72.6 %
PHOSPHATE SERPL-MCNC: 2.7 MG/DL
PLATELET # BLD AUTO: 128 K/UL
POTASSIUM SERPL-SCNC: 4.8 MMOL/L
PROT SERPL-MCNC: 5.7 G/DL
PT BLD: 12.7 SEC
RBC # BLD: 2.6 M/UL
RBC # FLD: 16.6 %
SODIUM SERPL-SCNC: 139 MMOL/L
WBC # FLD AUTO: 2.94 K/UL

## 2020-08-16 NOTE — PROGRESS NOTE ADULT - ASSESSMENT
pt with above history p/w difficulty ambulation / weakness       - flap tremor + on Exam , likely hepatic encephalopathy -  pt having bms after lactulose enema, monitor mental status closely , appreciate Hepatology consult input ,     - hyperbilirubinemia-GI f/u , cont rifaximin     - ARF on ckd stage 3 - likely hepatorenal , avoid nephrotoxic agents bladder scan to r/o retention , renal f/u - pt on aldactone+ Lasix+ zaroxolyn - pt doesn't appear fluid overloaded at present, hold diuretics, gentle iv fluids with close monitor of pts volume status    - dm2 - iss     - hyponatremia- renal f/u     - Thrombocytopenia - likely sec to liver pathology     - coagulopathy - likely sec to liver pathology , monitor closely for active signs of bleeding , vit k     - HLD - pt om parvastaTIN     had extensive lengthy discussion with pts wife /sons about pts current clinical status, management plan &  prognosis - guarded prognosis - palliative eval    ppi No

## 2020-08-17 ENCOUNTER — APPOINTMENT (OUTPATIENT)
Dept: TRANSPLANT | Facility: CLINIC | Age: 65
End: 2020-08-17
Payer: MEDICARE

## 2020-08-17 VITALS
RESPIRATION RATE: 13 BRPM | HEART RATE: 78 BPM | DIASTOLIC BLOOD PRESSURE: 65 MMHG | TEMPERATURE: 97.2 F | OXYGEN SATURATION: 99 % | HEIGHT: 72 IN | WEIGHT: 238 LBS | SYSTOLIC BLOOD PRESSURE: 105 MMHG | BODY MASS INDEX: 32.23 KG/M2

## 2020-08-17 PROCEDURE — 99024 POSTOP FOLLOW-UP VISIT: CPT

## 2020-08-19 NOTE — ASSESSMENT
[FreeTextEntry1] : 64 yr old male s/p OLT on 7/2/20. \par Labs from 8-14-20: AST: 18, ALT: 27, AlkP: 118, TB: 0.4, K: 4.8, Cr: 1.35 (previously 0.96), WBC: 2.94, H/H: 8.2/25.7, Plt: 128, Everolimus level pending. \par Needs PT/OT.  \par \par Doing very well, oriented and moving well. Using a walker, home health care appears not to be showing up. Gallo SINGLETON looking into issue. Left heal looks excellent. Large bandage is appropriate. Tamar wound well healed. T tube re secured with steri. Zortess 4.5mg bid, pred 5mg, cellcept 500mgbid, bactrim qday valcyte 450 qd, nystatin tid, protonix 40mg qday, 81mg asa qday mg oxide 800mg bid, flomax ..8mg qhs, lantus 5 units at night.\par \par Labs this Friday and Hepatology visit next week.

## 2020-08-19 NOTE — HISTORY OF PRESENT ILLNESS
[Nonalcoholic Steatohepatitis (JEAN)] : Nonalcoholic Steatohepatitis (JEAN) [Liver] : Liver [Donor after brain death (DBD] : Donor after brain death (DBD) [Positive/Negative] : Positive/Negative [PHS Increased Risk] : no Public Health Service increased risk [Hepatitis C (DANA+)] : no hepatitis c (DANA+) [Hepatitis B Core Ab Positive] : not hepatitis B core Ab positive [TextBox_52] : Donor (local or import? hospital): [facility]\par Donor ID:  [id number] Match: [id number]\par OPO: NYRT\par Age: 29\par ABO:  A1\par High Risk:   No \par COD: Cardiovascular \par X Clamp time TBD\par Medical Hx: Drug Abuse Positive cocaine, Negative: Barbituates, Methadone, Opiates, Phencyclidine, THC.   \par Covid Testing: Negative 6/30/2020 via bronchoalveolar lavage\par Anti CMV positive\par EBV IgG- positive\par HepBcAb-neg\par Hepatitis C-DANA- neg\par Hepatitis C ab-neg\par  [FreeTextEntry1] : 64 yr old PMHx of IDDM, HLD, obesity, HFpEF with mild LV diastolic  dysfunction, MGUS, chronic anemia with a history of duodenal ulcer as well as \par GAVE and duodenal AVM s/p APC (last on 10/11/19), 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 \par \par Patient underwent liver transplant on 7/2/20. Post course c/b VRE bacteremia (resolved) and delirium d/t CNI toxicity. Switched to cyclosporine to tacrolimus, and now on everolimus since 7/27 with improvement in mental status and tremors. Also diagnosed with severe SHENG, received home sleep apnea machine prior to discharge. \par \par Labs from 8-14-20: AST: 18, ALT: 27, AlkP: 118, TB: 0.4, K: 4.8, Cr: 1.35 (previously 0.96), WBC: 2.94, H/H: 8.2/25.7, Plt: 128, Everolimus level pending. \par \par Immunosuppression: Everolimus 4.5mg BID, MMF 500mg BID, Prednisone 5mg daily. \par On valcyte 900mg daily and Bactrim SS daily.

## 2020-08-19 NOTE — PHYSICAL EXAM
[Alert] : alert [Healthy Appearing] : healthy appearing [No Acute Distress] : no acute distress [Regular Rhythm] : regular rhythm [Normal Rate] : normal rate [Clear to Auscultation] : lungs were clear to auscultation bilaterally [Scleral Icterus] : no scleral icterus [Ascites Fluid Wave] : no ascites fluid wave [Ascites Tense] : no ascites tense [Abdominal Bruit] : no abdominal bruit [Healing Well] : healing poorly [Dry] : moist [Clean] : unclean [Asterixis] : no asterixis [de-identified] : Tamar Snow incision,  T-tube in place re secured,  [TextBox_40] : 2+ [de-identified] : R. heel lesion  [FreeTextEntry1] : Doing very well, oriented and moving well. Using a walker, home health care appears not to be showing up. Gallo SINGLETON looking into issue. Left heal looks excellent. Large bandage is appropriate. Tamar wound well healed. T tube re secured with steri. Zortess 4.5mg bid, pred 5mg, cellcept 500mgbid, bactrim qday valcyte 450 qd, nystatin tid, protonix 40mg qday, 81mg asa qday mg oxide 800mg bid, flomax ..8mg qhs, lantus 5 units at night.

## 2020-08-20 ENCOUNTER — APPOINTMENT (OUTPATIENT)
Dept: PULMONOLOGY | Facility: CLINIC | Age: 65
End: 2020-08-20
Payer: MEDICARE

## 2020-08-20 PROCEDURE — 99214 OFFICE O/P EST MOD 30 MIN: CPT | Mod: 95

## 2020-08-21 ENCOUNTER — LABORATORY RESULT (OUTPATIENT)
Age: 65
End: 2020-08-21

## 2020-08-21 ENCOUNTER — APPOINTMENT (OUTPATIENT)
Dept: TRANSPLANT | Facility: CLINIC | Age: 65
End: 2020-08-21

## 2020-08-21 LAB
ALBUMIN SERPL ELPH-MCNC: 3.6 G/DL
ALP BLD-CCNC: 115 U/L
ALT SERPL-CCNC: 21 U/L
ANION GAP SERPL CALC-SCNC: 10 MMOL/L
AST SERPL-CCNC: 11 U/L
BASOPHILS # BLD AUTO: 0.04 K/UL
BASOPHILS NFR BLD AUTO: 1.1 %
BILIRUB SERPL-MCNC: 0.3 MG/DL
BUN SERPL-MCNC: 27 MG/DL
CALCIUM SERPL-MCNC: 9 MG/DL
CHLORIDE SERPL-SCNC: 106 MMOL/L
CHOLEST SERPL-MCNC: 204 MG/DL
CHOLEST/HDLC SERPL: 4.4 RATIO
CO2 SERPL-SCNC: 24 MMOL/L
CREAT SERPL-MCNC: 1.23 MG/DL
EOSINOPHIL # BLD AUTO: 0.12 K/UL
EOSINOPHIL NFR BLD AUTO: 3.3 %
GGT SERPL-CCNC: 22 U/L
GLUCOSE SERPL-MCNC: 211 MG/DL
HCT VFR BLD CALC: 24.4 %
HDLC SERPL-MCNC: 46 MG/DL
HGB BLD-MCNC: 8 G/DL
IMM GRANULOCYTES NFR BLD AUTO: 0.3 %
INR PPP: 1.1 RATIO
LDLC SERPL CALC-MCNC: 120 MG/DL
LYMPHOCYTES # BLD AUTO: 0.45 K/UL
LYMPHOCYTES NFR BLD AUTO: 12.4 %
MAGNESIUM SERPL-MCNC: 2 MG/DL
MAN DIFF?: NORMAL
MCHC RBC-ENTMCNC: 31.9 PG
MCHC RBC-ENTMCNC: 32.8 GM/DL
MCV RBC AUTO: 97.2 FL
MONOCYTES # BLD AUTO: 0.26 K/UL
MONOCYTES NFR BLD AUTO: 7.2 %
NEUTROPHILS # BLD AUTO: 2.74 K/UL
NEUTROPHILS NFR BLD AUTO: 75.7 %
PLATELET # BLD AUTO: 134 K/UL
POTASSIUM SERPL-SCNC: 4.7 MMOL/L
PROT SERPL-MCNC: 5.7 G/DL
PT BLD: 12.9 SEC
RBC # BLD: 2.51 M/UL
RBC # FLD: 15.9 %
SODIUM SERPL-SCNC: 140 MMOL/L
TRIGL SERPL-MCNC: 187 MG/DL
WBC # FLD AUTO: 3.62 K/UL

## 2020-08-25 ENCOUNTER — APPOINTMENT (OUTPATIENT)
Dept: HEPATOLOGY | Facility: CLINIC | Age: 65
End: 2020-08-25

## 2020-08-25 ENCOUNTER — APPOINTMENT (OUTPATIENT)
Dept: HEPATOLOGY | Facility: CLINIC | Age: 65
End: 2020-08-25
Payer: MEDICARE

## 2020-08-25 LAB — EVEROLIMUS BLD-MCNC: 7 NG/ML

## 2020-08-25 PROCEDURE — 99215 OFFICE O/P EST HI 40 MIN: CPT

## 2020-08-25 RX ORDER — MYCOPHENOLATE MOFETIL 250 MG/1
250 CAPSULE ORAL TWICE DAILY
Qty: 180 | Refills: 3 | Status: DISCONTINUED | COMMUNITY
Start: 2020-07-21 | End: 2020-08-25

## 2020-08-26 LAB — EVEROLIMUS BLD-MCNC: 7.2 NG/ML

## 2020-08-26 NOTE — PHYSICAL THERAPY INITIAL EVALUATION ADULT - MD/RN NOTIFIED
yes no h/o SA.  Chronic self harm behaviors, ie head banging for attention and due to poor frustration tolerance. chronic behavioral acting out when frustrated or attention seeking. spoke to staff who was present in ER.

## 2020-08-28 ENCOUNTER — APPOINTMENT (OUTPATIENT)
Dept: TRANSPLANT | Facility: CLINIC | Age: 65
End: 2020-08-28

## 2020-08-28 ENCOUNTER — LABORATORY RESULT (OUTPATIENT)
Age: 65
End: 2020-08-28

## 2020-08-31 LAB
ALBUMIN SERPL ELPH-MCNC: 3.5 G/DL
ALP BLD-CCNC: 106 U/L
ALT SERPL-CCNC: 23 U/L
ANION GAP SERPL CALC-SCNC: 10 MMOL/L
AST SERPL-CCNC: 16 U/L
BILIRUB SERPL-MCNC: 0.3 MG/DL
BUN SERPL-MCNC: 24 MG/DL
CALCIUM SERPL-MCNC: 8.8 MG/DL
CHLORIDE SERPL-SCNC: 107 MMOL/L
CO2 SERPL-SCNC: 25 MMOL/L
CREAT SERPL-MCNC: 1.13 MG/DL
EVEROLIMUS BLD-MCNC: 7 NG/ML
GLUCOSE SERPL-MCNC: 224 MG/DL
INR PPP: 1.06 RATIO
POTASSIUM SERPL-SCNC: 4.9 MMOL/L
PROT SERPL-MCNC: 5.6 G/DL
PT BLD: 12.5 SEC
SODIUM SERPL-SCNC: 141 MMOL/L

## 2020-09-01 ENCOUNTER — LABORATORY RESULT (OUTPATIENT)
Age: 65
End: 2020-09-01

## 2020-09-01 ENCOUNTER — APPOINTMENT (OUTPATIENT)
Dept: TRANSPLANT | Facility: CLINIC | Age: 65
End: 2020-09-01
Payer: MEDICARE

## 2020-09-01 VITALS
OXYGEN SATURATION: 97 % | TEMPERATURE: 97.2 F | BODY MASS INDEX: 32.28 KG/M2 | SYSTOLIC BLOOD PRESSURE: 95 MMHG | RESPIRATION RATE: 14 BRPM | WEIGHT: 238 LBS | DIASTOLIC BLOOD PRESSURE: 58 MMHG | HEART RATE: 83 BPM

## 2020-09-01 LAB
BASOPHILS # BLD AUTO: 0.07 K/UL
BASOPHILS NFR BLD AUTO: 2.6 %
EOSINOPHIL # BLD AUTO: 0.1 K/UL
EOSINOPHIL NFR BLD AUTO: 3.5 %
HCT VFR BLD CALC: 26.8 %
HGB BLD-MCNC: 8.6 G/DL
LYMPHOCYTES # BLD AUTO: 0.36 K/UL
LYMPHOCYTES NFR BLD AUTO: 13 %
MAN DIFF?: NORMAL
MCHC RBC-ENTMCNC: 31.7 PG
MCHC RBC-ENTMCNC: 32.1 GM/DL
MCV RBC AUTO: 98.9 FL
MONOCYTES # BLD AUTO: 0.1 K/UL
MONOCYTES NFR BLD AUTO: 3.5 %
NEUTROPHILS # BLD AUTO: 2.14 K/UL
NEUTROPHILS NFR BLD AUTO: 76.5 %
PLATELET # BLD AUTO: 145 K/UL
RBC # BLD: 2.71 M/UL
RBC # FLD: 14.6 %
WBC # FLD AUTO: 2.8 K/UL

## 2020-09-01 PROCEDURE — 99214 OFFICE O/P EST MOD 30 MIN: CPT | Mod: 25

## 2020-09-04 ENCOUNTER — APPOINTMENT (OUTPATIENT)
Dept: UROLOGY | Facility: CLINIC | Age: 65
End: 2020-09-04
Payer: MEDICARE

## 2020-09-04 VITALS — TEMPERATURE: 98.2 F

## 2020-09-04 DIAGNOSIS — Z87.898 PERSONAL HISTORY OF OTHER SPECIFIED CONDITIONS: ICD-10-CM

## 2020-09-04 DIAGNOSIS — N41.2 ABSCESS OF PROSTATE: ICD-10-CM

## 2020-09-04 PROCEDURE — 99213 OFFICE O/P EST LOW 20 MIN: CPT

## 2020-09-06 LAB
APPEARANCE: CLEAR
BACTERIA UR CULT: NORMAL
BACTERIA: NEGATIVE
BILIRUBIN URINE: NEGATIVE
BLOOD URINE: NEGATIVE
COLOR: NORMAL
GLUCOSE QUALITATIVE U: ABNORMAL
HYALINE CASTS: 2 /LPF
KETONES URINE: NEGATIVE
LEUKOCYTE ESTERASE URINE: ABNORMAL
MICROSCOPIC-UA: NORMAL
NITRITE URINE: NEGATIVE
PH URINE: 5.5
PROTEIN URINE: NEGATIVE
RED BLOOD CELLS URINE: 3 /HPF
SPECIFIC GRAVITY URINE: 1.03
SQUAMOUS EPITHELIAL CELLS: 0 /HPF
UROBILINOGEN URINE: NORMAL
WHITE BLOOD CELLS URINE: 97 /HPF

## 2020-09-07 PROBLEM — Z87.898 HISTORY OF URINARY RETENTION: Status: RESOLVED | Noted: 2020-01-21 | Resolved: 2020-09-07

## 2020-09-07 PROBLEM — N41.2 PROSTATE ABSCESS: Status: RESOLVED | Noted: 2020-04-03 | Resolved: 2020-09-07

## 2020-09-07 NOTE — ASSESSMENT
[FreeTextEntry1] : urinating O - sent culture to ensure all good - leave symptoms as is\par discussed his ED - given no erection at all would not expect any response to oral agents and would need to be low dosed due to OLT: refer to [facility] for more aggressive therapy

## 2020-09-07 NOTE — HISTORY OF PRESENT ILLNESS
[FreeTextEntry1] : seen [facility] earlier this year with ESBL EColi UTI - imaging noted large prostate abscess\par underwent TUR - noted had eroded into bladder in trigone - open in prostate noting huge cavity. Place [place] catheter. Case complicated by sepsis and bleeding due to coagulopathy from livr disease.\par sent home with [facility] with plan for cystogram\par  SInce last visit had  OLT - looks a new man\par Voiding well - racehorse. Voids every 2 hours or so, more so at night. Has urgency and can have urge incontinence. on Tamsulosin.\par Also notes ED - hasn't had an erection in 8 years. No erections period with stimulation. Has good libido. \par \par

## 2020-09-08 NOTE — PLAN
[FreeTextEntry1] : Patient oriented with some mild edema, 2+ edema on everolimus with 7 level.. lower to 4mg bid, decubitus healing and foot ulcer healing.10mg lasix once per day. Labs 1 week and rtc 2 weeks.

## 2020-09-08 NOTE — REASON FOR VISIT
[Follow-Up] : a follow-up visit  [Spouse] : spouse [FreeTextEntry3] : OLT  [FreeTextEntry5] : 7/2/20

## 2020-09-08 NOTE — HISTORY OF PRESENT ILLNESS
[Nonalcoholic Steatohepatitis (JEAN)] : Nonalcoholic Steatohepatitis (JEAN) [Liver] : Liver [Donor after brain death (DBD] : Donor after brain death (DBD) [Positive/Negative] : Positive/Negative [PHS Increased Risk] : no Public Health Service increased risk [Hepatitis C (DANA+)] : no hepatitis c (DANA+) [Hepatitis B Core Ab Positive] : not hepatitis B core Ab positive [TextBox_52] : Donor (local or import? hospital): [facility]\par Donor ID:  [id number] Match: [id number]\par OPO: NYRT\par Age: 29\par ABO:  A1\par High Risk:   No \par COD: Cardiovascular \par X Clamp time TBD\par Medical Hx: Drug Abuse Positive cocaine, Negative: Barbituates, Methadone, Opiates, Phencyclidine, THC.   \par Covid Testing: Negative 6/30/2020 via bronchoalveolar lavage\par Anti CMV positive\par EBV IgG- positive\par HepBcAb-neg\par Hepatitis C-DANA- neg\par Hepatitis C ab-neg\par  [FreeTextEntry1] : 64 yr old PMHx of IDDM, HLD, obesity, HFpEF with mild LV diastolic  dysfunction, MGUS, chronic anemia with a history of duodenal ulcer as well as \par GAVE and duodenal AVM s/p APC (last on 10/11/19), 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 \par \par Patient underwent liver transplant on 7/2/20. Post course c/b VRE bacteremia (resolved) and delirium d/t CNI toxicity. Switched to cyclosporine to tacrolimus, and now on everolimus since 7/27 with improvement in mental status and tremors. Also diagnosed with severe SHENG, received home sleep apnea machine prior to discharge. \par \par Labs from 8-28-20: AST: 16, ALT: 23, AlkP: 106, TB: 0.3, K: 4.9, Cr: 1.13 (previously 1.23), CBC from 8-21  WBC:3.62 , H/H: 8.0/24.4, Plt: 134, Everolimus level 7.0 \par \par Immunosuppression: Everolimus 4.5mg BID, MMF 500mg BID, Prednisone 5mg daily. \par On valcyte 900mg daily and Bactrim SS daily.

## 2020-09-08 NOTE — PHYSICAL EXAM
[Alert] : alert [Healthy Appearing] : healthy appearing [No Acute Distress] : no acute distress [Clear to Auscultation] : lungs were clear to auscultation bilaterally [Normal Rate] : normal rate [Regular Rhythm] : regular rhythm [Scleral Icterus] : no scleral icterus [Ascites Fluid Wave] : no ascites fluid wave [Abdominal Bruit] : no abdominal bruit [Ascites Tense] : no ascites tense [Clean] : unclean [Dry] : moist [Healing Well] : healing poorly [Asterixis] : no asterixis [de-identified] : Tamar Snow incision,  T-tube in place re secured,  [TextBox_40] : 2+ [de-identified] : R. heel lesion  no eschar but clean [FreeTextEntry1] : Doing very well, oriented and moving well. Using a walker, home health care appears not to be showing up. Gallo SINGLETON looking into issue. Left heal looks excellent. Large bandage is appropriate. Tamar wound well healed. T tube re secured with steri. Zortess 4.5mg bid, pred 5mg, cellcept 500mgbid, bactrim qday valcyte 450 qd, nystatin tid, protonix 40mg qday, 81mg asa qday mg oxide 800mg bid, flomax ..8mg qhs, lantus 5 units at night. 2+ edema will decrease everolimus to 4mg bid no mouth ulcers but 2+ edema. Decrease edema with 10mg lasix.

## 2020-09-14 ENCOUNTER — APPOINTMENT (OUTPATIENT)
Dept: TRANSPLANT | Facility: CLINIC | Age: 65
End: 2020-09-14
Payer: MEDICARE

## 2020-09-14 VITALS
DIASTOLIC BLOOD PRESSURE: 48 MMHG | RESPIRATION RATE: 15 BRPM | SYSTOLIC BLOOD PRESSURE: 100 MMHG | HEART RATE: 90 BPM | WEIGHT: 241 LBS | OXYGEN SATURATION: 96 % | BODY MASS INDEX: 32.64 KG/M2 | TEMPERATURE: 96 F | HEIGHT: 72 IN

## 2020-09-14 LAB
ALBUMIN SERPL ELPH-MCNC: 3.5 G/DL
ALP BLD-CCNC: 100 U/L
ALT SERPL-CCNC: 19 U/L
ANION GAP SERPL CALC-SCNC: 10 MMOL/L
AST SERPL-CCNC: 11 U/L
BASOPHILS # BLD AUTO: 0.04 K/UL
BASOPHILS NFR BLD AUTO: 1.6 %
BILIRUB SERPL-MCNC: 0.3 MG/DL
BUN SERPL-MCNC: 21 MG/DL
CALCIUM SERPL-MCNC: 9 MG/DL
CHLORIDE SERPL-SCNC: 105 MMOL/L
CO2 SERPL-SCNC: 25 MMOL/L
CREAT SERPL-MCNC: 1.18 MG/DL
EOSINOPHIL # BLD AUTO: 0.09 K/UL
EOSINOPHIL NFR BLD AUTO: 3.6 %
ESTIMATED AVERAGE GLUCOSE: 180 MG/DL
EVEROLIMUS BLD-MCNC: 4.7 NG/ML
GGT SERPL-CCNC: 15 U/L
GLUCOSE SERPL-MCNC: 213 MG/DL
HBA1C MFR BLD HPLC: 7.9 %
HCT VFR BLD CALC: 25.7 %
HGB BLD-MCNC: 8.6 G/DL
IMM GRANULOCYTES NFR BLD AUTO: 0.4 %
LYMPHOCYTES # BLD AUTO: 0.51 K/UL
LYMPHOCYTES NFR BLD AUTO: 20.2 %
MAGNESIUM SERPL-MCNC: 2.1 MG/DL
MAN DIFF?: NORMAL
MCHC RBC-ENTMCNC: 31.7 PG
MCHC RBC-ENTMCNC: 33.5 GM/DL
MCV RBC AUTO: 94.8 FL
MONOCYTES # BLD AUTO: 0.2 K/UL
MONOCYTES NFR BLD AUTO: 7.9 %
NEUTROPHILS # BLD AUTO: 1.68 K/UL
NEUTROPHILS NFR BLD AUTO: 66.3 %
PLATELET # BLD AUTO: 139 K/UL
POTASSIUM SERPL-SCNC: 4.4 MMOL/L
PROT SERPL-MCNC: 5.5 G/DL
RBC # BLD: 2.71 M/UL
RBC # FLD: 14.2 %
SODIUM SERPL-SCNC: 140 MMOL/L
WBC # FLD AUTO: 2.53 K/UL

## 2020-09-14 PROCEDURE — 99024 POSTOP FOLLOW-UP VISIT: CPT

## 2020-09-14 RX ORDER — EVEROLIMUS 0.5 MG/1
0.5 TABLET ORAL
Qty: 180 | Refills: 3 | Status: DISCONTINUED | COMMUNITY
Start: 2020-08-04 | End: 2020-09-14

## 2020-09-14 NOTE — PROGRESS NOTE ADULT - ATTENDING COMMENTS
remains agitated.  psych regimen adjusted.   good graft function.   immuno: cya/cellcept/pred no distress/well-groomed

## 2020-09-14 NOTE — ASSESSMENT
[FreeTextEntry1] : 64 yr old male s/p OLT on 20. T-tube self dc'd by patient.\par Labs from 20:  LFTs normal, AST: 11, ALT: 19, AlkP: 100, M.1,  WBC: 2.53, H/H: 8.6/25.7, Plt: 139, A1C: 7.9, Ever: 4.7. \par \par Doing very well, oriented and moving well. Using a walker, Left heel ulcer looks excellent. Large bandage is appropriate. Decubitus healing. Tamar wound well healed. Zortess 4mg bid, pred 5mg, cellcept 500mg bid, \par 2+ edema will decrease everolimus to 4mg bid. No mouth ulcers but 2+ edema. Decrease edema with 10mg lasix.\par \par Increase lasix to 20mg daily. \par Check B/L Duplex to r/o DVT. \par Follow up with Endo for DM mgt. Start epo.\par \par RTC 2 weeks with labs prior Friday, mild decrease in wbc will monitor.

## 2020-09-14 NOTE — PHYSICAL EXAM
[Alert] : alert [Healthy Appearing] : healthy appearing [No Acute Distress] : no acute distress [Clear to Auscultation] : lungs were clear to auscultation bilaterally [Normal Rate] : normal rate [Regular Rhythm] : regular rhythm [Scleral Icterus] : no scleral icterus [Ascites Fluid Wave] : no ascites fluid wave [Abdominal Bruit] : no abdominal bruit [Ascites Tense] : no ascites tense [Clean] : unclean [Dry] : moist [Healing Well] : healing poorly [Jaundice] : no jaundice [Asterixis] : no asterixis [de-identified] : Tamar nielsen [TextBox_40] : 2+ [de-identified] : R. heel lesion  no eschar but clean, improved needs to lose edema [FreeTextEntry1] : Doing very well, oriented and moving well. Using a walker, Left heel looks excellent. Large bandage is appropriate. Tamar wound well healed. \par Zortess 4mg bid, pred 5mg, cellcept 500mg bid, bactrim qday valcyte 450 qd, nystatin tid, protonix 40mg qday, 81mg asa qday mg oxide 800mg bid, flomax 0.8mg qhs, lantus 6 units at night. \par \par \par 2+ edema have decreased everolimus to 4mg bid. No mouth ulcers but 2+ edema. Decrease edema with 20mg lasix as there was no change on 10 mg. Glucose still elevated. A1C: 7.9. Has Endo follow up scheduled.  [de-identified] : intact. Wife says he is 90%

## 2020-09-14 NOTE — HISTORY OF PRESENT ILLNESS
[Nonalcoholic Steatohepatitis (JEAN)] : Nonalcoholic Steatohepatitis (JEAN) [Liver] : Liver [Donor after brain death (DBD] : Donor after brain death (DBD) [Positive/Negative] : Positive/Negative [PHS Increased Risk] : no Public Health Service increased risk [Hepatitis B Core Ab Positive] : not hepatitis B core Ab positive [TextBox_52] : Donor (local or import? hospital): [facility]\par Donor ID:  [id number] Match: [id number]\par OPO: NYRT\par Age: 29\par ABO:  A1\par High Risk:   No \par COD: Cardiovascular \par X Clamp time TBD\par Medical Hx: Drug Abuse Positive cocaine, Negative: Barbituates, Methadone, Opiates, Phencyclidine, THC.   \par Covid Testing: Negative 6/30/2020 via bronchoalveolar lavage\par Anti CMV positive\par EBV IgG- positive\par HepBcAb-neg\par Hepatitis C-DANA- neg\par Hepatitis C ab-neg\par  [Hepatitis C (DANA+)] : no hepatitis c (DANA+) [FreeTextEntry1] : 64 yr old PMHx of IDDM, HLD, obesity, HFpEF with mild LV diastolic  dysfunction, MGUS, chronic anemia with a history of duodenal ulcer as well as \par GAVE and duodenal AVM s/p APC (last on 10/11/19), 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  Multiple recent hospitalizations due to UTIs, emphysematous cystitis (2020) and prostate abscess (3/2020). \par \par Patient underwent liver transplant on 20. Post course c/b VRE bacteremia (resolved) and delirium d/t CNI toxicity. Switched to cyclosporine to tacrolimus, and now on everolimus since  with improvement in mental status and tremors. Also diagnosed with severe SHENG, received home sleep apnea machine prior to discharge. \par \par Most recently, saw with urology for ED. Presents today still with bilateral lower extremity edema +2, on lasix 10mg daily. Has not received procit d/t insurance. \par \par Labs from 20:  LFTs normal, AST: 11, ALT: 19, AlkP: 100, M.1,  WBC: 2.53, H/H: 8.6/25.7, Plt: 139, A1C: 7.9, Ever: 4.7. \par \par Immunosuppression: Everolimus 4mg BID, MMF 500mg BID, Prednisone 5mg daily. \par On valcyte 900mg daily and Bactrim SS daily, Lasix 10mg daily.

## 2020-09-15 ENCOUNTER — OUTPATIENT (OUTPATIENT)
Dept: OUTPATIENT SERVICES | Facility: HOSPITAL | Age: 65
LOS: 1 days | End: 2020-09-15
Payer: MEDICARE

## 2020-09-15 ENCOUNTER — APPOINTMENT (OUTPATIENT)
Dept: ULTRASOUND IMAGING | Facility: CLINIC | Age: 65
End: 2020-09-15
Payer: MEDICARE

## 2020-09-15 ENCOUNTER — RESULT REVIEW (OUTPATIENT)
Age: 65
End: 2020-09-15

## 2020-09-15 DIAGNOSIS — Z94.4 LIVER TRANSPLANT STATUS: ICD-10-CM

## 2020-09-15 DIAGNOSIS — M79.89 OTHER SPECIFIED SOFT TISSUE DISORDERS: ICD-10-CM

## 2020-09-15 DIAGNOSIS — Z90.49 ACQUIRED ABSENCE OF OTHER SPECIFIED PARTS OF DIGESTIVE TRACT: Chronic | ICD-10-CM

## 2020-09-15 PROCEDURE — 93970 EXTREMITY STUDY: CPT

## 2020-09-15 PROCEDURE — 93970 EXTREMITY STUDY: CPT | Mod: 26

## 2020-09-18 ENCOUNTER — APPOINTMENT (OUTPATIENT)
Dept: UROLOGY | Facility: CLINIC | Age: 65
End: 2020-09-18

## 2020-09-25 ENCOUNTER — LABORATORY RESULT (OUTPATIENT)
Age: 65
End: 2020-09-25

## 2020-09-25 ENCOUNTER — APPOINTMENT (OUTPATIENT)
Dept: TRANSPLANT | Facility: CLINIC | Age: 65
End: 2020-09-25

## 2020-09-25 LAB
ALBUMIN SERPL ELPH-MCNC: 3.6 G/DL
ALP BLD-CCNC: 116 U/L
ALT SERPL-CCNC: 18 U/L
ANION GAP SERPL CALC-SCNC: 11 MMOL/L
AST SERPL-CCNC: 16 U/L
BASOPHILS # BLD AUTO: 0.03 K/UL
BASOPHILS NFR BLD AUTO: 0.9 %
BILIRUB SERPL-MCNC: 0.2 MG/DL
BUN SERPL-MCNC: 30 MG/DL
CALCIUM SERPL-MCNC: 8.8 MG/DL
CHLORIDE SERPL-SCNC: 104 MMOL/L
CO2 SERPL-SCNC: 25 MMOL/L
CREAT SERPL-MCNC: 1.34 MG/DL
EOSINOPHIL # BLD AUTO: 0.09 K/UL
EOSINOPHIL NFR BLD AUTO: 2.7 %
GGT SERPL-CCNC: 13 U/L
GLUCOSE SERPL-MCNC: 240 MG/DL
HCT VFR BLD CALC: 28.4 %
HGB BLD-MCNC: 9.3 G/DL
IMM GRANULOCYTES NFR BLD AUTO: 0.6 %
LYMPHOCYTES # BLD AUTO: 0.46 K/UL
LYMPHOCYTES NFR BLD AUTO: 13.9 %
MAGNESIUM SERPL-MCNC: 2.1 MG/DL
MAN DIFF?: NORMAL
MCHC RBC-ENTMCNC: 31.4 PG
MCHC RBC-ENTMCNC: 32.7 GM/DL
MCV RBC AUTO: 95.9 FL
MONOCYTES # BLD AUTO: 0.21 K/UL
MONOCYTES NFR BLD AUTO: 6.3 %
NEUTROPHILS # BLD AUTO: 2.5 K/UL
NEUTROPHILS NFR BLD AUTO: 75.6 %
PLATELET # BLD AUTO: 133 K/UL
POTASSIUM SERPL-SCNC: 4.7 MMOL/L
PROT SERPL-MCNC: 5.9 G/DL
RBC # BLD: 2.96 M/UL
RBC # FLD: 13.3 %
SODIUM SERPL-SCNC: 140 MMOL/L
WBC # FLD AUTO: 3.31 K/UL

## 2020-09-29 ENCOUNTER — APPOINTMENT (OUTPATIENT)
Dept: TRANSPLANT | Facility: CLINIC | Age: 65
End: 2020-09-29

## 2020-09-29 ENCOUNTER — APPOINTMENT (OUTPATIENT)
Dept: TRANSPLANT | Facility: CLINIC | Age: 65
End: 2020-09-29
Payer: MEDICARE

## 2020-09-29 ENCOUNTER — APPOINTMENT (OUTPATIENT)
Dept: HEPATOLOGY | Facility: CLINIC | Age: 65
End: 2020-09-29
Payer: MEDICARE

## 2020-09-29 VITALS
DIASTOLIC BLOOD PRESSURE: 70 MMHG | OXYGEN SATURATION: 100 % | HEART RATE: 84 BPM | SYSTOLIC BLOOD PRESSURE: 119 MMHG | TEMPERATURE: 97.9 F | HEIGHT: 72 IN | RESPIRATION RATE: 16 BRPM

## 2020-09-29 LAB — EVEROLIMUS BLD-MCNC: 7.2 NG/ML

## 2020-09-29 PROCEDURE — 99214 OFFICE O/P EST MOD 30 MIN: CPT | Mod: 24

## 2020-09-29 PROCEDURE — 99214 OFFICE O/P EST MOD 30 MIN: CPT

## 2020-09-29 NOTE — HISTORY OF PRESENT ILLNESS
[FreeTextEntry1] : Cause(s) of Liver Disease: Nonalcoholic Steatohepatitis (JEAN) \par Transplant Organ(s): Liver \par Transplant Type: Donor after brain death (DBD) \par CMV Status (Donor/Recipient): Positive/Negative \par Donor Characteristics: no Public Health Service increased risk, no hepatitis c (DANA+), not hepatitis B core Ab positive \par Donor (local or import? hospital): [facility]\par Donor ID: [id number] Match: [id number]\par OPO: NYRT\par Age: 29\par ABO: A1\par High Risk: No \par COD: Cardiovascular \par X Clamp time TBD\par Medical Hx: Drug Abuse Positive cocaine, Negative: Barbituates, Methadone, Opiates, Phencyclidine, THC. \par Covid Testing: Negative 2020 via bronchoalveolar lavage\par Anti CMV positive\par EBV IgG- positive\par HepBcAb-neg\par Hepatitis C-DANA- neg\par Hepatitis C ab-neg\par  \par 64 yr old PMHx of IDDM, HLD, obesity, HFpEF with mild LV diastolic dysfunction, MGUS, chronic anemia with a history of duodenal ulcer as well as \par GAVE and duodenal AVM s/p APC (last on 10/11/19), 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 Multiple recent hospitalizations due to UTIs, emphysematous cystitis (2020) and prostate abscess (3/2020). \par \par Patient underwent liver transplant on 20. Post course c/b VRE bacteremia (resolved) and delirium d/t CNI toxicity. Switched to cyclosporine to tacrolimus, and now on everolimus since  with improvement in mental status and tremors. Also diagnosed with severe SHENG, received home sleep apnea machine prior to discharge. \par \par Most recently, saw with urology for ED. Presents today still with bilateral lower extremity edema +2, on lasix 10mg daily. Has not received procit d/t insurance. \par \par Labs from 20: LFTs normal, AST: 16, ALT: 18, AlkP: 116, M.1, Cr 1.34, eGfr 56, WBC: 3.31, H/H: 9.3/28.4, Plt: 133, A1C: 7.9, Ever: 7.2\par \par Immunosuppression: Everolimus 4mg BID, MMF 500mg BID, Prednisone 5mg daily. \par On valcyte 900mg daily and Bactrim SS daily, Lasix 10mg daily. \par \par

## 2020-09-29 NOTE — PHYSICAL EXAM
[Alert] : alert [Healthy Appearing] : healthy appearing [No Acute Distress] : no acute distress [Clear to Auscultation] : lungs were clear to auscultation bilaterally [Normal Rate] : normal rate [Regular Rhythm] : regular rhythm [Scleral Icterus] : no scleral icterus [Abdominal Bruit] : no abdominal bruit [Ascites Fluid Wave] : no ascites fluid wave [Ascites Tense] : no ascites tense [Clean] : unclean [Dry] : moist [Healing Well] : healing poorly [Asterixis] : no asterixis [de-identified] : Tamar nielsen [FreeTextEntry1] : right foot  superficial healing well with granulation.  [TextBox_40] : 2+ [de-identified] : R. heel lesion

## 2020-09-29 NOTE — HISTORY OF PRESENT ILLNESS
[Nonalcoholic Steatohepatitis (JEAN)] : Nonalcoholic Steatohepatitis (JEAN) [Liver] : Liver [Donor after brain death (DBD] : Donor after brain death (DBD) [Positive/Negative] : Positive/Negative [PHS Increased Risk] : no Public Health Service increased risk [Hepatitis C (DANA+)] : no hepatitis c (DANA+) [Hepatitis B Core Ab Positive] : not hepatitis B core Ab positive [TextBox_52] : Donor (local or import? hospital): [facility]\par Donor ID:  [id number] Match: [id number]\par OPO: NYRT\par Age: 29\par ABO:  A1\par High Risk:   No \par COD: Cardiovascular \par X Clamp time TBD\par Medical Hx: Drug Abuse Positive cocaine, Negative: Barbituates, Methadone, Opiates, Phencyclidine, THC.   \par Covid Testing: Negative 6/30/2020 via bronchoalveolar lavage\par Anti CMV positive\par EBV IgG- positive\par HepBcAb-neg\par Hepatitis C-DANA- neg\par Hepatitis C ab-neg\par  [FreeTextEntry1] : 64 yr old PMHx of IDDM, HLD, obesity, HFpEF with mild LV diastolic  dysfunction, MGUS, chronic anemia with a history of duodenal ulcer as well as \par GAVE and duodenal AVM s/p APC (last on 10/11/19), 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 \par \par Patient underwent liver transplant on 7/2/20. Post course c/b VRE bacteremia (resolved) and delirium d/t CNI toxicity. Switched to cyclosporine to tacrolimus, and now on everolimus since 7/27 with improvement in mental status and tremors. Also diagnosed with severe SHENG, received home sleep apnea machine prior to discharge. \par \par Patient presents for follow up, and looks remarkably well. He came to clinic walking with support of a walker, and has improved strength in his legs. He tells me that he has been pushing himself everyday. \par \par Recent labs from 21 Aug 2020 was reviwed. This revealed a hemoglobin of 8 from per deciliter with a platelet count 134,000. His INR was 1.10. Lipid profile revealed right , cholesterol 204, HDL 46, . Mg level was  2 mg per deciliter. Serum creatinine was 1.23 mg/dL. Serum sodium was 140, potassium 4.7. Liver function tests were normal with total bilirubin 0.3, AST 11, ALT 41, alkaline phosphatase 150. GGT level was 22\par Immunosuppression: Everolimus 4 mg BID, MMF 500mg daily, Prednisone 5mg daily. \par On valcyte 450 mg daily and Bactrim SS daily. \par \par Interval hX ( 9/29/2020)\par \par Patient presents for a follow up. He seems to be be overall doing well, gained weight. Main problem is non-healing ulcer in the right foot, and in the upper cleft of his buttock. Aside from this his blood sugar has been suboptimally controlled. He also has mildly elevated serum Cr 1.34. He has significant LE edema right > left. He remains Prednisone 5 mg mg, Zotress 4 mg bid, and Cellcept 500 mg bid. \par Labs shows normal liver enzymes, and bilirubin. Everolimus level is 7.2.

## 2020-09-29 NOTE — ASSESSMENT
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 His postoperative course was completed with tacrolimus toxicity resolved after switching to Everolimus. He has excellent graft function. He he 2+ LE edema with a non-healing ulcer in the right foot ( but to me appears healing with granulation), persistent anemia ( improved), and mild elevation of serum Cr. No proteinuria.\par \par PLAN\par -He has persistent anemia, and leukopenia. Likely immunosuppression related- but has improved  since LT. In order to allow better healing of the foot ulcer and buttock ulcer, I will increase Cellcept to 1 g bid, and reduce Zortesss ro 3 mg bid. Target level ~5.. If his anemia persists will have to initiate Epo.We will plan for follow up labs this coming Friday.\par \par -Continue with Magnesium Oxide 400 mg 2 tablets twice a day. His magnesium level is 2 mg/dL.\par -His diabetes needs tighter controll. We will increase his Lantus to 10 units and keep premeal time insulin 6 units..He is going to see his endocrinologist soon and we will expect further evaluation of his diabetes management.\par \par -Continue with Valcyte 450 mg once a day for CMV prophylaxis.\par \par -Continued Bactrim DS one tablet daily for PCP prophylaxis.\par \par -Continue with nystatin swish and swallow 3 times a day.  He will complete this soon. No more refills.\par \par -Wound care-dry dressing every other day. Would recommend cautious weight-bearing on the affected leg. He will follow up with his podiatryst. Would avoid further debridement at this time. Seems healing to me.\par \par -Increase Lasix to 40 mg and 20 mg on alternate day. He has significant LE edema likely impacting the healing of his foot ulcer.\par \par RTC in 2 weeks.

## 2020-09-29 NOTE — REASON FOR VISIT
[Follow-Up] : a follow-up visit  [Family Member] : family member [FreeTextEntry3] : liver transplant  [FreeTextEntry5] : 7/2/20

## 2020-09-29 NOTE — REVIEW OF SYSTEMS
[Anemia] : anemia [Negative] : Psychiatric [de-identified] : left foot healing superficial ulcer with granulation.

## 2020-09-29 NOTE — PHYSICAL EXAM
[Alert] : alert [No Acute Distress] : no acute distress [Supple] : the neck was supple [Clear to Auscultation] : lungs were clear to auscultation bilaterally [Breathing Comfortably on room air] : breathing comfortably on room air [Normal Rate] : normal rate [Abdominal Ascites] : no abdominal ascites [Clean] : clean [Dry] : dry [Healing Well] : healing well [de-identified] : weight decreased to 237lbs (for some reason not recorded) [de-identified] : soft non-tender bilateral subcostal and xiphisternal extension incision healed [de-identified] : sacral decubitus ulcer - 3cm, superficial no surrounding cellulitis [FreeTextEntry1] : 64M, s/p OLT 7/2/20\par Doing well overall\par 1) liver function - good\par 2) immunosuppression - decrease everolimus to 3 & 4mg, cellcept 500mgbid, pred 5mg\par 3) high BSL 200s-240s; increase lantus 6u to 10u, endocrine r/v re: glucose control\par 4) peripheral edema: increase lasix to 20mg bid\par 5) right heel ulcer:  healing well, no need for debridement, wrap leg with compression bandage (showed son how to apply)\par 6) renal function (Cr 1.4) - for repeat labs friday\par \par

## 2020-09-30 LAB
ALBUMIN SERPL ELPH-MCNC: 3.9 G/DL
ALP BLD-CCNC: 132 U/L
ALT SERPL-CCNC: 20 U/L
ANION GAP SERPL CALC-SCNC: 11 MMOL/L
AST SERPL-CCNC: 13 U/L
BILIRUB SERPL-MCNC: 0.2 MG/DL
BUN SERPL-MCNC: 32 MG/DL
CALCIUM SERPL-MCNC: 9.2 MG/DL
CHLORIDE SERPL-SCNC: 102 MMOL/L
CO2 SERPL-SCNC: 25 MMOL/L
CREAT SERPL-MCNC: 1.39 MG/DL
GLUCOSE SERPL-MCNC: 237 MG/DL
POTASSIUM SERPL-SCNC: 4.8 MMOL/L
PROT SERPL-MCNC: 6.2 G/DL
SODIUM SERPL-SCNC: 138 MMOL/L

## 2020-10-02 ENCOUNTER — APPOINTMENT (OUTPATIENT)
Dept: TRANSPLANT | Facility: CLINIC | Age: 65
End: 2020-10-02

## 2020-10-03 ENCOUNTER — LABORATORY RESULT (OUTPATIENT)
Age: 65
End: 2020-10-03

## 2020-10-05 ENCOUNTER — RX RENEWAL (OUTPATIENT)
Age: 65
End: 2020-10-05

## 2020-10-06 LAB
ALBUMIN SERPL ELPH-MCNC: 3.7 G/DL
ALP BLD-CCNC: 127 U/L
ALT SERPL-CCNC: 19 U/L
ANION GAP SERPL CALC-SCNC: 11 MMOL/L
AST SERPL-CCNC: 12 U/L
BASOPHILS # BLD AUTO: 0.04 K/UL
BASOPHILS NFR BLD AUTO: 1.4 %
BILIRUB SERPL-MCNC: 0.3 MG/DL
BUN SERPL-MCNC: 31 MG/DL
CALCIUM SERPL-MCNC: 8.9 MG/DL
CHLORIDE SERPL-SCNC: 103 MMOL/L
CO2 SERPL-SCNC: 25 MMOL/L
CREAT SERPL-MCNC: 1.32 MG/DL
EOSINOPHIL # BLD AUTO: 0.08 K/UL
EOSINOPHIL NFR BLD AUTO: 2.9 %
GGT SERPL-CCNC: 13 U/L
GLUCOSE SERPL-MCNC: 268 MG/DL
HCT VFR BLD CALC: 29 %
HGB BLD-MCNC: 9.5 G/DL
IMM GRANULOCYTES NFR BLD AUTO: 0.7 %
LYMPHOCYTES # BLD AUTO: 0.48 K/UL
LYMPHOCYTES NFR BLD AUTO: 17.4 %
MAN DIFF?: NORMAL
MCHC RBC-ENTMCNC: 31.1 PG
MCHC RBC-ENTMCNC: 32.8 GM/DL
MCV RBC AUTO: 95.1 FL
MONOCYTES # BLD AUTO: 0.19 K/UL
MONOCYTES NFR BLD AUTO: 6.9 %
NEUTROPHILS # BLD AUTO: 1.95 K/UL
NEUTROPHILS NFR BLD AUTO: 70.7 %
PLATELET # BLD AUTO: 138 K/UL
POTASSIUM SERPL-SCNC: 4.7 MMOL/L
PROT SERPL-MCNC: 6 G/DL
RBC # BLD: 3.05 M/UL
RBC # FLD: 13.1 %
SODIUM SERPL-SCNC: 139 MMOL/L
WBC # FLD AUTO: 2.76 K/UL

## 2020-10-07 LAB — EVEROLIMUS BLD-MCNC: 8 NG/ML

## 2020-10-12 ENCOUNTER — APPOINTMENT (OUTPATIENT)
Dept: TRANSPLANT | Facility: CLINIC | Age: 65
End: 2020-10-12

## 2020-10-13 ENCOUNTER — APPOINTMENT (OUTPATIENT)
Dept: HEPATOLOGY | Facility: CLINIC | Age: 65
End: 2020-10-13
Payer: MEDICARE

## 2020-10-13 VITALS
BODY MASS INDEX: 31.97 KG/M2 | TEMPERATURE: 97.9 F | HEIGHT: 72 IN | RESPIRATION RATE: 16 BRPM | SYSTOLIC BLOOD PRESSURE: 110 MMHG | HEART RATE: 94 BPM | WEIGHT: 236 LBS | DIASTOLIC BLOOD PRESSURE: 69 MMHG

## 2020-10-13 LAB
ALBUMIN SERPL ELPH-MCNC: 3.7 G/DL
ALP BLD-CCNC: 117 U/L
ALT SERPL-CCNC: 18 U/L
ANION GAP SERPL CALC-SCNC: 12 MMOL/L
AST SERPL-CCNC: 15 U/L
BASOPHILS # BLD AUTO: 0.03 K/UL
BASOPHILS NFR BLD AUTO: 1 %
BILIRUB SERPL-MCNC: 0.3 MG/DL
BUN SERPL-MCNC: 30 MG/DL
CALCIUM SERPL-MCNC: 9.1 MG/DL
CHLORIDE SERPL-SCNC: 103 MMOL/L
CO2 SERPL-SCNC: 26 MMOL/L
CREAT SERPL-MCNC: 1.34 MG/DL
EOSINOPHIL # BLD AUTO: 0.08 K/UL
EOSINOPHIL NFR BLD AUTO: 2.6 %
GGT SERPL-CCNC: 11 U/L
GLUCOSE SERPL-MCNC: 198 MG/DL
HCT VFR BLD CALC: 31.6 %
HGB BLD-MCNC: 10.2 G/DL
IMM GRANULOCYTES NFR BLD AUTO: 1.3 %
LYMPHOCYTES # BLD AUTO: 0.58 K/UL
LYMPHOCYTES NFR BLD AUTO: 19 %
MAGNESIUM SERPL-MCNC: 2.1 MG/DL
MAN DIFF?: NORMAL
MCHC RBC-ENTMCNC: 30.9 PG
MCHC RBC-ENTMCNC: 32.3 GM/DL
MCV RBC AUTO: 95.8 FL
MONOCYTES # BLD AUTO: 0.24 K/UL
MONOCYTES NFR BLD AUTO: 7.9 %
NEUTROPHILS # BLD AUTO: 2.08 K/UL
NEUTROPHILS NFR BLD AUTO: 68.2 %
PLATELET # BLD AUTO: 161 K/UL
POTASSIUM SERPL-SCNC: 4.5 MMOL/L
PROT SERPL-MCNC: 6 G/DL
RBC # BLD: 3.3 M/UL
RBC # FLD: 13.1 %
SODIUM SERPL-SCNC: 141 MMOL/L
WBC # FLD AUTO: 3.05 K/UL

## 2020-10-13 PROCEDURE — 99214 OFFICE O/P EST MOD 30 MIN: CPT

## 2020-10-14 LAB — EVEROLIMUS BLD-MCNC: 23.8 NG/ML

## 2020-10-19 LAB — EVEROLIMUS BLD-MCNC: 6.7 NG/ML

## 2020-10-27 ENCOUNTER — LABORATORY RESULT (OUTPATIENT)
Age: 65
End: 2020-10-27

## 2020-10-27 LAB
ALBUMIN SERPL ELPH-MCNC: 3.5 G/DL
ALP BLD-CCNC: 120 U/L
ALT SERPL-CCNC: 19 U/L
ANION GAP SERPL CALC-SCNC: 10 MMOL/L
AST SERPL-CCNC: 21 U/L
BASOPHILS # BLD AUTO: 0.03 K/UL
BASOPHILS NFR BLD AUTO: 1.1 %
BILIRUB SERPL-MCNC: 0.2 MG/DL
BUN SERPL-MCNC: 30 MG/DL
CALCIUM SERPL-MCNC: 8.8 MG/DL
CHLORIDE SERPL-SCNC: 101 MMOL/L
CO2 SERPL-SCNC: 25 MMOL/L
CREAT SERPL-MCNC: 1.35 MG/DL
EOSINOPHIL # BLD AUTO: 0.04 K/UL
EOSINOPHIL NFR BLD AUTO: 1.5 %
GLUCOSE SERPL-MCNC: 253 MG/DL
HCT VFR BLD CALC: 30.6 %
HGB BLD-MCNC: 9.5 G/DL
IMM GRANULOCYTES NFR BLD AUTO: 0.7 %
INR PPP: 0.97 RATIO
LYMPHOCYTES # BLD AUTO: 0.57 K/UL
LYMPHOCYTES NFR BLD AUTO: 21.1 %
MAN DIFF?: NORMAL
MCHC RBC-ENTMCNC: 29.4 PG
MCHC RBC-ENTMCNC: 31 GM/DL
MCV RBC AUTO: 94.7 FL
MONOCYTES # BLD AUTO: 0.49 K/UL
MONOCYTES NFR BLD AUTO: 18.1 %
NEUTROPHILS # BLD AUTO: 1.55 K/UL
NEUTROPHILS NFR BLD AUTO: 57.5 %
PLATELET # BLD AUTO: 174 K/UL
POTASSIUM SERPL-SCNC: 4.9 MMOL/L
PROT SERPL-MCNC: 5.9 G/DL
PT BLD: 11.5 SEC
RBC # BLD: 3.23 M/UL
RBC # FLD: 12.5 %
SODIUM SERPL-SCNC: 136 MMOL/L
WBC # FLD AUTO: 2.7 K/UL

## 2020-10-29 LAB — EVEROLIMUS BLD-MCNC: 6.1 NG/ML

## 2020-11-02 NOTE — PROGRESS NOTE ADULT - SUBJECTIVE AND OBJECTIVE BOX
[facility]  RUR-17% Low     has initiated outpatient dialysis referral via [facility] [place]. Alma Delia Lemus, MS    1:54 pm Patient has been accepted by [facility], patient is planned for perma cath placement today. No COVID test is needed prior to discharge as [facility] will test patient when he arrives. CM to monitor. Alma Delia Lemus, MS    4:04 pm CM received confirmation from [facility] that patient has been accepted to [address] [place] [address] with a chair time of 3:45. CM to update [facility] regarding discharge.      Bruno Jules MS Chief Complaint:  Patient is a 64y old  Male who presents with a chief complaint of low Hgb (2020 14:43)      Interval Events:     Allergies:  codeine (Anaphylaxis)  NO  RED MEAT (Unknown)      Hospital Medications:  dextrose 40% Gel 15 Gram(s) Oral once PRN  dextrose 5%. 1000 milliLiter(s) IV Continuous <Continuous>  dextrose 50% Injectable 12.5 Gram(s) IV Push once  dextrose 50% Injectable 25 Gram(s) IV Push once  dextrose 50% Injectable 25 Gram(s) IV Push once  ferrous    sulfate 325 milliGRAM(s) Oral daily  folic acid 1 milliGRAM(s) Oral daily  glucagon  Injectable 1 milliGRAM(s) IntraMuscular once PRN  insulin glargine Injectable (LANTUS) 28 Unit(s) SubCutaneous at bedtime  insulin lispro (HumaLOG) corrective regimen sliding scale   SubCutaneous three times a day before meals  insulin lispro (HumaLOG) corrective regimen sliding scale   SubCutaneous at bedtime  insulin lispro Injectable (HumaLOG) 8 Unit(s) SubCutaneous three times a day before meals  lactulose Syrup 20 Gram(s) Oral three times a day  midodrine. 20 milliGRAM(s) Oral three times a day  pantoprazole    Tablet 40 milliGRAM(s) Oral two times a day  pantoprazole Infusion 8 mG/Hr IV Continuous <Continuous>  rifAXIMin 550 milliGRAM(s) Oral two times a day  tamsulosin 0.4 milliGRAM(s) Oral at bedtime      PMHX/PSHX:  GIB (gastrointestinal bleeding)  GERD with esophagitis  Hepatic encephalopathy  Obesity  Fatty liver disease, nonalcoholic  Renal stones  Hypertension  Neuropathy  Hypercholesteremia  Diabetes  S/P cholecystectomy  No significant past surgical history      Family history:  Family history of type 2 diabetes mellitus  Family history of hypertension  Family history of stomach cancer  No pertinent family history in first degree relatives      ROS:     General:  No wt loss, fevers, chills, night sweats, fatigue,   Eyes:  Good vision, no reported pain  ENT:  No sore throat, pain, runny nose, dysphagia  CV:  No pain, palpitations, hypo/hypertension  Resp:  No dyspnea, cough, tachypnea, wheezing  GI:  See HPI  :  No pain, bleeding, incontinence, nocturia  Muscle:  No pain, weakness  Neuro:  No weakness, tingling, memory problems  Psych:  No fatigue, insomnia, mood problems, depression  Endocrine:  No polyuria, polydipsia, cold/heat intolerance  Heme:  No petechiae, ecchymosis, easy bruisability  Skin:  No rash, edema      PHYSICAL EXAM:     GENERAL:  Appears stated age, well-groomed, well-nourished, no distress  HEENT:  NC/AT,  conjunctivae clear, sclera -anicteric  CHEST:  Full & symmetric excursion, no increased effort, breath sounds clear  HEART:  Regular rhythm, S1, S2, no murmur/rub/S3/S4,  no edema  ABDOMEN:  Soft, non-tender, non-distended, normoactive bowel sounds,  no masses ,no hepato-splenomegaly,   EXTREMITIES:  no cyanosis,clubbing or edema  SKIN:  No rash/erythema/ecchymoses/petechiae/wounds/abscess/warm/dry  NEURO:  Alert, oriented    Vital Signs:  Vital Signs Last 24 Hrs  T(C): 36.6 (2020 04:49), Max: 37.8 (2020 21:35)  T(F): 97.9 (2020 04:49), Max: 100 (2020 21:35)  HR: 86 (2020 04:49) (86 - 92)  BP: 112/63 (2020 04:49) (109/67 - 114/51)  BP(mean): --  RR: 18 (2020 04:49) (18 - 18)  SpO2: 97% (2020 04:49) (93% - 97%)  Daily     Daily Weight in k (2020 04:49)    LABS:                        7.1    5.10  )-----------( 73       ( 2020 09:54 )             21.6         135  |  102  |  17  ----------------------------<  175<H>  4.0   |  23  |  1.11    Ca    8.6      2020 07:22    TPro  5.1<L>  /  Alb  2.5<L>  /  TBili  8.7<H>  /  DBili  x   /  AST  29  /  ALT  17  /  AlkPhos  180<H>      LIVER FUNCTIONS - ( 2020 07:22 )  Alb: 2.5 g/dL / Pro: 5.1 g/dL / ALK PHOS: 180 U/L / ALT: 17 U/L / AST: 29 U/L / GGT: x           PT/INR - ( 2020 10:02 )   PT: 23.7 sec;   INR: 2.02 ratio         PTT - ( 31 Dec 2019 09:17 )  PTT:42.1 sec        Imaging: Chief Complaint:  Patient is a 64y old  Male who presents with a chief complaint of low Hgb (2020 14:43)      Interval Events: Patient with large left thigh hematoma as seen on US.  Hgb has been slightly drifting down.  Otherwise patient feels ok.     Allergies:  codeine (Anaphylaxis)  NO  RED MEAT (Unknown)      Hospital Medications:  dextrose 40% Gel 15 Gram(s) Oral once PRN  dextrose 5%. 1000 milliLiter(s) IV Continuous <Continuous>  dextrose 50% Injectable 12.5 Gram(s) IV Push once  dextrose 50% Injectable 25 Gram(s) IV Push once  dextrose 50% Injectable 25 Gram(s) IV Push once  ferrous    sulfate 325 milliGRAM(s) Oral daily  folic acid 1 milliGRAM(s) Oral daily  glucagon  Injectable 1 milliGRAM(s) IntraMuscular once PRN  insulin glargine Injectable (LANTUS) 28 Unit(s) SubCutaneous at bedtime  insulin lispro (HumaLOG) corrective regimen sliding scale   SubCutaneous three times a day before meals  insulin lispro (HumaLOG) corrective regimen sliding scale   SubCutaneous at bedtime  insulin lispro Injectable (HumaLOG) 8 Unit(s) SubCutaneous three times a day before meals  lactulose Syrup 20 Gram(s) Oral three times a day  midodrine. 20 milliGRAM(s) Oral three times a day  pantoprazole    Tablet 40 milliGRAM(s) Oral two times a day  pantoprazole Infusion 8 mG/Hr IV Continuous <Continuous>  rifAXIMin 550 milliGRAM(s) Oral two times a day  tamsulosin 0.4 milliGRAM(s) Oral at bedtime      PMHX/PSHX:  GIB (gastrointestinal bleeding)  GERD with esophagitis  Hepatic encephalopathy  Obesity  Fatty liver disease, nonalcoholic  Renal stones  Hypertension  Neuropathy  Hypercholesteremia  Diabetes  S/P cholecystectomy  No significant past surgical history      Family history:  Family history of type 2 diabetes mellitus  Family history of hypertension  Family history of stomach cancer  No pertinent family history in first degree relatives      ROS:     General:  No wt loss, fevers, chills, night sweats, fatigue,   Eyes:  Good vision, no reported pain  ENT:  No sore throat, pain, runny nose, dysphagia  CV:  No pain, palpitations, hypo/hypertension  Resp:  No dyspnea, cough, tachypnea, wheezing  GI:  See HPI  :  No pain, bleeding, incontinence, nocturia  Muscle:  left thigh pain  Neuro:  No weakness, tingling, memory problems  Psych:  No fatigue, insomnia, mood problems, depression  Endocrine:  No polyuria, polydipsia, cold/heat intolerance  Heme:  No petechiae, ecchymosis, easy bruisability  Skin:  No rash, edema      PHYSICAL EXAM:     GENERAL: NAD  HEENT:  NC/AT, jaundice  CHEST:  Full & symmetric excursion, no increased effort  ABDOMEN:  Soft, non-tender, non-distended, +BS  EXTREMITIES:  no edema, L>R thigh swelling  SKIN:  No rash  NEURO:  Alert, oriented       Vital Signs:  Vital Signs Last 24 Hrs  T(C): 36.6 (2020 04:49), Max: 37.8 (2020 21:35)  T(F): 97.9 (2020 04:49), Max: 100 (2020 21:35)  HR: 86 (2020 04:49) (86 - 92)  BP: 112/63 (2020 04:49) (109/67 - 114/51)  BP(mean): --  RR: 18 (2020 04:49) (18 - 18)  SpO2: 97% (2020 04:49) (93% - 97%)  Daily     Daily Weight in k (2020 04:49)    LABS:                        7.1    5.10  )-----------( 73       ( 2020 09:54 )             21.6     -    135  |  102  |  17  ----------------------------<  175<H>  4.0   |  23  |  1.11    Ca    8.6      2020 07:22    TPro  5.1<L>  /  Alb  2.5<L>  /  TBili  8.7<H>  /  DBili  x   /  AST  29  /  ALT  17  /  AlkPhos  180<H>      LIVER FUNCTIONS - ( 2020 07:22 )  Alb: 2.5 g/dL / Pro: 5.1 g/dL / ALK PHOS: 180 U/L / ALT: 17 U/L / AST: 29 U/L / GGT: x           PT/INR - ( 2020 10:02 )   PT: 23.7 sec;   INR: 2.02 ratio         PTT - ( 31 Dec 2019 09:17 )  PTT:42.1 sec        Imaging:  < from: CT Lower Extremity No Cont, Left (.19 @ 15:34) >    EXAM:  CT LWR EXT LT                            PROCEDURE DATE:  2019      INTERPRETATION:    CT OF THE LEFT LOWER EXTREMITY    CLINICAL INFORMATION: Pain and swelling of the left thigh.    TECHNIQUE: CT of the left lower extremity was performed from the level of the hip to the level of the knee. The study was performed without the use of intravenous or intra-articular contrast. Multiplanar reformats were generated for review.     COMPARISON: CT of the left femur 2019. CT of the abdomen and pelvis 2019.    FINDINGS:    BONE: No acute fracture. No focal lytic or blastic lesions. No cortical destruction or new periosteal bone formation. Enthesopathy at the attachment of the gluteal tendons on the greater trochanter. No dislocation. Multilevel spondylosis and facet arthropathy of the lower lumbar spine. Partial ankylosis of the left SI joint. Degenerative changes of the pubic symphysis. Cartilage space loss with marginal osteophyte formation of the left hip and knee joints. Small left knee joint effusion. No hip joint effusion.    SOFT TISSUE: Intramuscular hematoma is identified in the vastus intermedius measuring approximately 43 x 30 x 140 mm. The hematoma is new when compared to prior CT. Hematoma causes mild expansion of the vastus intermedialis muscle. No additional focal collections are identified. No focal muscle atrophy. Normal CT appearance of the imaged tendons. Vascular calcifications. Vascular structures are otherwise normal in course and caliber. Edema in the subcutaneous fat along the anterior and lateral aspects of the mid to distal thigh. Mild skin thickening in the distal thigh. Nonobstructive renal calculus in the left kidney. Trace pelvic free fluid. No pelvic lymphadenopathy.      IMPRESSION:  1.  New intramuscular hematoma in the vastus intermedialis measuring 43 x 30 x 140 mm.  2.  CT is otherwise similar in appearance to prior examination.    CESIA GOLDSTEIN M.D., ATTENDING RADIOLOGIST  This document has been electronically signed. Dec 31 2019  4:04PM      < end of copied text >

## 2020-11-03 ENCOUNTER — APPOINTMENT (OUTPATIENT)
Dept: TRANSPLANT | Facility: CLINIC | Age: 65
End: 2020-11-03
Payer: MEDICARE

## 2020-11-03 PROCEDURE — 99072 ADDL SUPL MATRL&STAF TM PHE: CPT

## 2020-11-03 PROCEDURE — 99214 OFFICE O/P EST MOD 30 MIN: CPT

## 2020-11-03 RX ORDER — WHEELCHAIR
EACH MISCELLANEOUS
Qty: 1 | Refills: 0 | Status: DISCONTINUED | OUTPATIENT
Start: 2020-04-10 | End: 2020-11-03

## 2020-11-03 NOTE — PLAN
[FreeTextEntry1] : Recommend new endo, Dr. Gray will try to speak with Dr. Ruvalcaba. Glucose a major issue. Zortress is 3/3mg Cellcelpt 500mg bid, Prd 5mg. Plan lower Zortress to 3/2mg. Would like to see wound. RTC 3 weeks.Will change insulin. Ancelmo will rx and teach Insulin.Decrease magnesium to 800mg once per day.

## 2020-11-03 NOTE — ASSESSMENT
[FreeTextEntry1] : 64 yr old male s/p OLT on 20. T-tube self dc'd by patient.\par Labs from 10/27/20:  LFTs normal, AST: 21, ALT: 19, AlkP: 120 TB .2, M.1,  WBC: 2.70, H/H: 9.5/30.6, Plt: 174, A1C: 7.9, Ever:6.1 egfr 55, cr 1.35 glucose 253\par \par Doing very well, oriented and moving well. Using a walker, Left heel ulcer looks excellent. Large bandage is appropriate. Decubitus healing. Tamar wound well healed. Zortess 4mg bid, pred 5mg, cellcept 500mg bid, \par 1+ edema will decrease everolimus to 4mg bid. No mouth ulcers but 2+ edema. Decrease edema with 10mg lasix.\par \par Increase lasix to 20mg daily. \par Check B/L Duplex to r/o DVT. Negative\par \par \par

## 2020-11-03 NOTE — PHYSICAL EXAM
[Alert] : alert [Healthy Appearing] : healthy appearing [No Acute Distress] : no acute distress [Clear to Auscultation] : lungs were clear to auscultation bilaterally [Normal Rate] : normal rate [Regular Rhythm] : regular rhythm [Non-Tender] : Liver Edge: non-tender [Scleral Icterus] : no scleral icterus [Abdominal Bruit] : no abdominal bruit [Ascites Fluid Wave] : no ascites fluid wave [Ascites Tense] : no ascites tense [Clean] : unclean [Dry] : moist [Healing Well] : healing poorly [Jaundice] : no jaundice [Asterixis] : no asterixis [de-identified] : liver edge palpable [de-identified] : Tamar Snow incision well healed, obese [TextBox_40] : 2+ [FreeTextEntry1] : Doing very well, oriented and moving well. Using a walker, Left heel looks excellent. Large bandage is appropriate. Tamar wound well healed. \par Zortess 4mg bid, pred 5mg, cellcept 500mg bid, bactrim qday valcyte 450 qd, nystatindc, protonix 40mg qday, 81mg asa qday mg oxide 800mgQday, flomax 0.8mg qhs, lantus 6 units at night. \par \par \par 2+ edema have decreased everolimus to 3/2.  No mouth ulcers but 2+ edema. Decreased edema with 20mg lasix as there was no change on 10 mg. Glucose still elevated. A1C: 7.9. Has Endo follow up scheduled.

## 2020-11-06 ENCOUNTER — APPOINTMENT (OUTPATIENT)
Dept: ENDOCRINOLOGY | Facility: CLINIC | Age: 65
End: 2020-11-06

## 2020-11-18 ENCOUNTER — APPOINTMENT (OUTPATIENT)
Dept: TRANSPLANT | Facility: CLINIC | Age: 65
End: 2020-11-18

## 2020-11-19 LAB
ALBUMIN SERPL ELPH-MCNC: 3.6 G/DL
ALP BLD-CCNC: 103 U/L
ALT SERPL-CCNC: 14 U/L
ANION GAP SERPL CALC-SCNC: 10 MMOL/L
AST SERPL-CCNC: 13 U/L
BASOPHILS # BLD AUTO: 0.05 K/UL
BASOPHILS NFR BLD AUTO: 1.4 %
BILIRUB SERPL-MCNC: 0.2 MG/DL
BUN SERPL-MCNC: 31 MG/DL
CALCIUM SERPL-MCNC: 9.1 MG/DL
CHLORIDE SERPL-SCNC: 107 MMOL/L
CHOLEST SERPL-MCNC: 206 MG/DL
CO2 SERPL-SCNC: 22 MMOL/L
CREAT SERPL-MCNC: 1.31 MG/DL
EOSINOPHIL # BLD AUTO: 0.07 K/UL
EOSINOPHIL NFR BLD AUTO: 1.9 %
GGT SERPL-CCNC: 14 U/L
GLUCOSE SERPL-MCNC: 121 MG/DL
HCT VFR BLD CALC: 29.6 %
HDLC SERPL-MCNC: 51 MG/DL
HGB BLD-MCNC: 9.2 G/DL
IMM GRANULOCYTES NFR BLD AUTO: 0.8 %
LDLC SERPL CALC-MCNC: 129 MG/DL
LYMPHOCYTES # BLD AUTO: 0.74 K/UL
LYMPHOCYTES NFR BLD AUTO: 20.4 %
MAGNESIUM SERPL-MCNC: 2 MG/DL
MAN DIFF?: NORMAL
MCHC RBC-ENTMCNC: 29.3 PG
MCHC RBC-ENTMCNC: 31.1 GM/DL
MCV RBC AUTO: 94.3 FL
MONOCYTES # BLD AUTO: 0.37 K/UL
MONOCYTES NFR BLD AUTO: 10.2 %
NEUTROPHILS # BLD AUTO: 2.36 K/UL
NEUTROPHILS NFR BLD AUTO: 65.3 %
NONHDLC SERPL-MCNC: 155 MG/DL
PLATELET # BLD AUTO: 164 K/UL
POTASSIUM SERPL-SCNC: 5.2 MMOL/L
PROT SERPL-MCNC: 5.7 G/DL
RBC # BLD: 3.14 M/UL
RBC # FLD: 12.8 %
SODIUM SERPL-SCNC: 140 MMOL/L
TRIGL SERPL-MCNC: 131 MG/DL
WBC # FLD AUTO: 3.62 K/UL

## 2020-11-20 ENCOUNTER — INPATIENT (INPATIENT)
Facility: HOSPITAL | Age: 65
LOS: 11 days | Discharge: HOME HEALTH | DRG: 628 | End: 2020-12-02
Attending: TRANSPLANT SURGERY | Admitting: TRANSPLANT SURGERY
Payer: MEDICARE

## 2020-11-20 VITALS
OXYGEN SATURATION: 97 % | HEIGHT: 73 IN | TEMPERATURE: 98 F | SYSTOLIC BLOOD PRESSURE: 159 MMHG | DIASTOLIC BLOOD PRESSURE: 78 MMHG | WEIGHT: 244.93 LBS | HEART RATE: 93 BPM | RESPIRATION RATE: 18 BRPM

## 2020-11-20 DIAGNOSIS — Z90.49 ACQUIRED ABSENCE OF OTHER SPECIFIED PARTS OF DIGESTIVE TRACT: Chronic | ICD-10-CM

## 2020-11-20 DIAGNOSIS — T14.8XXA OTHER INJURY OF UNSPECIFIED BODY REGION, INITIAL ENCOUNTER: ICD-10-CM

## 2020-11-20 LAB
ALBUMIN SERPL ELPH-MCNC: 3.7 G/DL — SIGNIFICANT CHANGE UP (ref 3.3–5)
ALP SERPL-CCNC: 100 U/L — SIGNIFICANT CHANGE UP (ref 40–120)
ALT FLD-CCNC: 17 U/L — SIGNIFICANT CHANGE UP (ref 10–45)
ANION GAP SERPL CALC-SCNC: 10 MMOL/L — SIGNIFICANT CHANGE UP (ref 5–17)
APTT BLD: 31 SEC — SIGNIFICANT CHANGE UP (ref 27.5–35.5)
AST SERPL-CCNC: 18 U/L — SIGNIFICANT CHANGE UP (ref 10–40)
BASE EXCESS BLDV CALC-SCNC: -0.4 MMOL/L — SIGNIFICANT CHANGE UP (ref -2–2)
BASOPHILS # BLD AUTO: 0.04 K/UL — SIGNIFICANT CHANGE UP (ref 0–0.2)
BASOPHILS NFR BLD AUTO: 0.9 % — SIGNIFICANT CHANGE UP (ref 0–2)
BILIRUB SERPL-MCNC: 0.2 MG/DL — SIGNIFICANT CHANGE UP (ref 0.2–1.2)
BUN SERPL-MCNC: 29 MG/DL — HIGH (ref 7–23)
CA-I SERPL-SCNC: 1.24 MMOL/L — SIGNIFICANT CHANGE UP (ref 1.12–1.3)
CALCIUM SERPL-MCNC: 9.3 MG/DL — SIGNIFICANT CHANGE UP (ref 8.4–10.5)
CHLORIDE BLDV-SCNC: 107 MMOL/L — SIGNIFICANT CHANGE UP (ref 96–108)
CHLORIDE SERPL-SCNC: 106 MMOL/L — SIGNIFICANT CHANGE UP (ref 96–108)
CO2 BLDV-SCNC: 27 MMOL/L — SIGNIFICANT CHANGE UP (ref 22–30)
CO2 SERPL-SCNC: 24 MMOL/L — SIGNIFICANT CHANGE UP (ref 22–31)
CREAT SERPL-MCNC: 1.39 MG/DL — HIGH (ref 0.5–1.3)
CRP SERPL-MCNC: 3.73 MG/DL — HIGH (ref 0–0.4)
EOSINOPHIL # BLD AUTO: 0.05 K/UL — SIGNIFICANT CHANGE UP (ref 0–0.5)
EOSINOPHIL NFR BLD AUTO: 1.2 % — SIGNIFICANT CHANGE UP (ref 0–6)
EVEROLIMUS BLD-MCNC: 4.3 NG/ML
GAS PNL BLDV: 139 MMOL/L — SIGNIFICANT CHANGE UP (ref 135–145)
GAS PNL BLDV: SIGNIFICANT CHANGE UP
GLUCOSE BLDV-MCNC: 96 MG/DL — SIGNIFICANT CHANGE UP (ref 70–99)
GLUCOSE SERPL-MCNC: 96 MG/DL — SIGNIFICANT CHANGE UP (ref 70–99)
HCO3 BLDV-SCNC: 25 MMOL/L — SIGNIFICANT CHANGE UP (ref 21–29)
HCT VFR BLD CALC: 29 % — LOW (ref 39–50)
HCT VFR BLDA CALC: 30 % — LOW (ref 39–50)
HGB BLD CALC-MCNC: 9.8 G/DL — LOW (ref 13–17)
HGB BLD-MCNC: 9.2 G/DL — LOW (ref 13–17)
IMM GRANULOCYTES NFR BLD AUTO: 0.5 % — SIGNIFICANT CHANGE UP (ref 0–1.5)
INR BLD: 1 RATIO — SIGNIFICANT CHANGE UP (ref 0.88–1.16)
LACTATE BLDV-MCNC: 1 MMOL/L — SIGNIFICANT CHANGE UP (ref 0.7–2)
LYMPHOCYTES # BLD AUTO: 0.72 K/UL — LOW (ref 1–3.3)
LYMPHOCYTES # BLD AUTO: 16.6 % — SIGNIFICANT CHANGE UP (ref 13–44)
MCHC RBC-ENTMCNC: 29 PG — SIGNIFICANT CHANGE UP (ref 27–34)
MCHC RBC-ENTMCNC: 31.7 GM/DL — LOW (ref 32–36)
MCV RBC AUTO: 91.5 FL — SIGNIFICANT CHANGE UP (ref 80–100)
MONOCYTES # BLD AUTO: 0.35 K/UL — SIGNIFICANT CHANGE UP (ref 0–0.9)
MONOCYTES NFR BLD AUTO: 8.1 % — SIGNIFICANT CHANGE UP (ref 2–14)
NEUTROPHILS # BLD AUTO: 3.16 K/UL — SIGNIFICANT CHANGE UP (ref 1.8–7.4)
NEUTROPHILS NFR BLD AUTO: 72.7 % — SIGNIFICANT CHANGE UP (ref 43–77)
NRBC # BLD: 0 /100 WBCS — SIGNIFICANT CHANGE UP (ref 0–0)
PCO2 BLDV: 50 MMHG — SIGNIFICANT CHANGE UP (ref 35–50)
PH BLDV: 7.32 — LOW (ref 7.35–7.45)
PLATELET # BLD AUTO: 163 K/UL — SIGNIFICANT CHANGE UP (ref 150–400)
PO2 BLDV: 32 MMHG — SIGNIFICANT CHANGE UP (ref 25–45)
POTASSIUM BLDV-SCNC: 4.2 MMOL/L — SIGNIFICANT CHANGE UP (ref 3.5–5.3)
POTASSIUM SERPL-MCNC: 4.3 MMOL/L — SIGNIFICANT CHANGE UP (ref 3.5–5.3)
POTASSIUM SERPL-SCNC: 4.3 MMOL/L — SIGNIFICANT CHANGE UP (ref 3.5–5.3)
PROT SERPL-MCNC: 6.7 G/DL — SIGNIFICANT CHANGE UP (ref 6–8.3)
PROTHROM AB SERPL-ACNC: 12 SEC — SIGNIFICANT CHANGE UP (ref 10.6–13.6)
RBC # BLD: 3.17 M/UL — LOW (ref 4.2–5.8)
RBC # FLD: 12.8 % — SIGNIFICANT CHANGE UP (ref 10.3–14.5)
SAO2 % BLDV: 51 % — LOW (ref 67–88)
SARS-COV-2 RNA SPEC QL NAA+PROBE: SIGNIFICANT CHANGE UP
SODIUM SERPL-SCNC: 140 MMOL/L — SIGNIFICANT CHANGE UP (ref 135–145)
WBC # BLD: 4.34 K/UL — SIGNIFICANT CHANGE UP (ref 3.8–10.5)
WBC # FLD AUTO: 4.34 K/UL — SIGNIFICANT CHANGE UP (ref 3.8–10.5)

## 2020-11-20 PROCEDURE — 99285 EMERGENCY DEPT VISIT HI MDM: CPT

## 2020-11-20 PROCEDURE — 73630 X-RAY EXAM OF FOOT: CPT | Mod: 26,RT

## 2020-11-20 PROCEDURE — 99221 1ST HOSP IP/OBS SF/LOW 40: CPT | Mod: GC,24

## 2020-11-20 RX ORDER — ASPIRIN/CALCIUM CARB/MAGNESIUM 324 MG
81 TABLET ORAL DAILY
Refills: 0 | Status: DISCONTINUED | OUTPATIENT
Start: 2020-11-20 | End: 2020-11-24

## 2020-11-20 RX ORDER — MEROPENEM 1 G/30ML
1000 INJECTION INTRAVENOUS EVERY 12 HOURS
Refills: 0 | Status: DISCONTINUED | OUTPATIENT
Start: 2020-11-21 | End: 2020-11-21

## 2020-11-20 RX ORDER — VALGANCICLOVIR 450 MG/1
900 TABLET, FILM COATED ORAL DAILY
Refills: 0 | Status: DISCONTINUED | OUTPATIENT
Start: 2020-11-20 | End: 2020-11-21

## 2020-11-20 RX ORDER — MAGNESIUM OXIDE 400 MG ORAL TABLET 241.3 MG
800 TABLET ORAL
Refills: 0 | Status: DISCONTINUED | OUTPATIENT
Start: 2020-11-20 | End: 2020-11-24

## 2020-11-20 RX ORDER — SODIUM CHLORIDE 9 MG/ML
1000 INJECTION, SOLUTION INTRAVENOUS
Refills: 0 | Status: DISCONTINUED | OUTPATIENT
Start: 2020-11-20 | End: 2020-11-24

## 2020-11-20 RX ORDER — DEXTROSE 50 % IN WATER 50 %
25 SYRINGE (ML) INTRAVENOUS ONCE
Refills: 0 | Status: DISCONTINUED | OUTPATIENT
Start: 2020-11-20 | End: 2020-11-24

## 2020-11-20 RX ORDER — INFLUENZA VIRUS VACCINE 15; 15; 15; 15 UG/.5ML; UG/.5ML; UG/.5ML; UG/.5ML
0.5 SUSPENSION INTRAMUSCULAR ONCE
Refills: 0 | Status: DISCONTINUED | OUTPATIENT
Start: 2020-11-20 | End: 2020-11-21

## 2020-11-20 RX ORDER — INSULIN LISPRO 100/ML
VIAL (ML) SUBCUTANEOUS AT BEDTIME
Refills: 0 | Status: DISCONTINUED | OUTPATIENT
Start: 2020-11-20 | End: 2020-11-23

## 2020-11-20 RX ORDER — EVEROLIMUS 10 MG/1
2 TABLET ORAL
Refills: 0 | Status: DISCONTINUED | OUTPATIENT
Start: 2020-11-20 | End: 2020-11-21

## 2020-11-20 RX ORDER — VANCOMYCIN HCL 1 G
1000 VIAL (EA) INTRAVENOUS ONCE
Refills: 0 | Status: DISCONTINUED | OUTPATIENT
Start: 2020-11-20 | End: 2020-11-20

## 2020-11-20 RX ORDER — MEROPENEM 1 G/30ML
1000 INJECTION INTRAVENOUS ONCE
Refills: 0 | Status: COMPLETED | OUTPATIENT
Start: 2020-11-20 | End: 2020-11-20

## 2020-11-20 RX ORDER — TAMSULOSIN HYDROCHLORIDE 0.4 MG/1
0.4 CAPSULE ORAL AT BEDTIME
Refills: 0 | Status: DISCONTINUED | OUTPATIENT
Start: 2020-11-20 | End: 2020-11-24

## 2020-11-20 RX ORDER — PANTOPRAZOLE SODIUM 20 MG/1
40 TABLET, DELAYED RELEASE ORAL
Refills: 0 | Status: DISCONTINUED | OUTPATIENT
Start: 2020-11-20 | End: 2020-11-24

## 2020-11-20 RX ORDER — EVEROLIMUS 10 MG/1
3 TABLET ORAL
Refills: 0 | Status: DISCONTINUED | OUTPATIENT
Start: 2020-11-20 | End: 2020-11-21

## 2020-11-20 RX ORDER — DEXTROSE 50 % IN WATER 50 %
15 SYRINGE (ML) INTRAVENOUS ONCE
Refills: 0 | Status: DISCONTINUED | OUTPATIENT
Start: 2020-11-20 | End: 2020-11-24

## 2020-11-20 RX ORDER — GLUCAGON INJECTION, SOLUTION 0.5 MG/.1ML
1 INJECTION, SOLUTION SUBCUTANEOUS ONCE
Refills: 0 | Status: DISCONTINUED | OUTPATIENT
Start: 2020-11-20 | End: 2020-11-24

## 2020-11-20 RX ORDER — INSULIN GLARGINE 100 [IU]/ML
15 INJECTION, SOLUTION SUBCUTANEOUS AT BEDTIME
Refills: 0 | Status: DISCONTINUED | OUTPATIENT
Start: 2020-11-20 | End: 2020-11-23

## 2020-11-20 RX ORDER — PIPERACILLIN AND TAZOBACTAM 4; .5 G/20ML; G/20ML
3.38 INJECTION, POWDER, LYOPHILIZED, FOR SOLUTION INTRAVENOUS ONCE
Refills: 0 | Status: COMPLETED | OUTPATIENT
Start: 2020-11-20 | End: 2020-11-20

## 2020-11-20 RX ORDER — MEROPENEM 1 G/30ML
INJECTION INTRAVENOUS
Refills: 0 | Status: DISCONTINUED | OUTPATIENT
Start: 2020-11-20 | End: 2020-11-20

## 2020-11-20 RX ORDER — EVEROLIMUS 10 MG/1
2 TABLET ORAL
Refills: 0 | Status: DISCONTINUED | OUTPATIENT
Start: 2020-11-20 | End: 2020-11-20

## 2020-11-20 RX ORDER — MEROPENEM 1 G/30ML
1000 INJECTION INTRAVENOUS EVERY 8 HOURS
Refills: 0 | Status: DISCONTINUED | OUTPATIENT
Start: 2020-11-20 | End: 2020-11-20

## 2020-11-20 RX ORDER — INSULIN LISPRO 100/ML
VIAL (ML) SUBCUTANEOUS
Refills: 0 | Status: DISCONTINUED | OUTPATIENT
Start: 2020-11-20 | End: 2020-11-23

## 2020-11-20 RX ORDER — FUROSEMIDE 40 MG
40 TABLET ORAL DAILY
Refills: 0 | Status: DISCONTINUED | OUTPATIENT
Start: 2020-11-20 | End: 2020-11-22

## 2020-11-20 RX ORDER — MYCOPHENOLATE MOFETIL 250 MG/1
500 CAPSULE ORAL
Refills: 0 | Status: DISCONTINUED | OUTPATIENT
Start: 2020-11-20 | End: 2020-11-21

## 2020-11-20 RX ORDER — DEXTROSE 50 % IN WATER 50 %
12.5 SYRINGE (ML) INTRAVENOUS ONCE
Refills: 0 | Status: DISCONTINUED | OUTPATIENT
Start: 2020-11-20 | End: 2020-11-24

## 2020-11-20 RX ORDER — INSULIN LISPRO 100/ML
10 VIAL (ML) SUBCUTANEOUS
Refills: 0 | Status: DISCONTINUED | OUTPATIENT
Start: 2020-11-20 | End: 2020-11-24

## 2020-11-20 RX ORDER — MEROPENEM 1 G/30ML
INJECTION INTRAVENOUS
Refills: 0 | Status: DISCONTINUED | OUTPATIENT
Start: 2020-11-20 | End: 2020-11-21

## 2020-11-20 RX ADMIN — INSULIN GLARGINE 15 UNIT(S): 100 INJECTION, SOLUTION SUBCUTANEOUS at 23:42

## 2020-11-20 RX ADMIN — PIPERACILLIN AND TAZOBACTAM 200 GRAM(S): 4; .5 INJECTION, POWDER, LYOPHILIZED, FOR SOLUTION INTRAVENOUS at 18:48

## 2020-11-20 RX ADMIN — TAMSULOSIN HYDROCHLORIDE 0.4 MILLIGRAM(S): 0.4 CAPSULE ORAL at 22:45

## 2020-11-20 RX ADMIN — Medication 1 TABLET(S): at 20:49

## 2020-11-20 RX ADMIN — MYCOPHENOLATE MOFETIL 500 MILLIGRAM(S): 250 CAPSULE ORAL at 23:32

## 2020-11-20 RX ADMIN — EVEROLIMUS 2 MILLIGRAM(S): 10 TABLET ORAL at 20:49

## 2020-11-20 RX ADMIN — VALGANCICLOVIR 900 MILLIGRAM(S): 450 TABLET, FILM COATED ORAL at 20:49

## 2020-11-20 RX ADMIN — MEROPENEM 100 MILLIGRAM(S): 1 INJECTION INTRAVENOUS at 23:32

## 2020-11-20 NOTE — CONSULT NOTE ADULT - SUBJECTIVE AND OBJECTIVE BOX
Chief Complaint:  Patient is a 64y old  Male who presents with a chief complaint of     HPI:  Mr. eSgundo is a 65yo M with PMH of DM, HLD, GERD, HFpEF with mild LV diastolic dysfunction, and decompensated JEAN cirrhosis, duodenal ulcer, GAVE and duodenal AVM s/p APC (last on 10/11/19), SHENG, s/p liver transplant (7/2/20). Post-op course c/b tacrolimus toxicity and hospital delirium improving after stopping CNI and replacing w/ everolimus.   Patient now presents with worsening LE swelling and right foot wound concerning for OM.    Patient reports longstanding non-healing right heel ulcer. Was follows by podiatry as outpatient with no improvement. No fever, chills, draining pus, no pain on weight bearing. Adherent to his immunosuppressive meds.     Allergies:  codeine (Anaphylaxis)      Home Medications:    Hospital Medications:      PMHX/PSHX:  GIB (gastrointestinal bleeding)    GERD with esophagitis    Hepatic encephalopathy    Obesity    Fatty liver disease, nonalcoholic    Renal stones    Hypertension    Neuropathy    Hypercholesteremia    Diabetes    S/P cholecystectomy    No significant past surgical history        Family history:  Family history of type 2 diabetes mellitus    Family history of hypertension    Family history of stomach cancer    No pertinent family history in first degree relatives        Denies family history of colon cancer/polyps, stomach cancer/polyps, pancreatic cancer/masses, liver cancer/disease, ovarian cancer and endometrial cancer.    Social History:     Tob: Denies  EtOH: Denies  Illicit Drugs: Denies    ROS:     General:  No wt loss, fevers, chills, night sweats, fatigue  Eyes:  Good vision, no reported pain  ENT:  No sore throat, pain, runny nose, dysphagia  CV:  No pain, palpitations, hypo/hypertension  Pulm:  No dyspnea, cough, tachypnea, wheezing  GI:  No pain, No nausea, No vomiting, No diarrhea, No constipation, No weight loss, No fever, No pruritis, No rectal bleeding, No tarry stools, No dysphagia,  :  No pain, bleeding, incontinence, nocturia  Muscle:  No pain, weakness  Neuro:  No weakness, tingling, memory problems  Psych:  No fatigue, insomnia, mood problems, depression  Endocrine:  No polyuria, polydipsia, cold/heat intolerance  Heme:  No petechiae, ecchymosis, easy bruisability  Skin:  No rash, tattoos, scars, edema    PHYSICAL EXAM:     GENERAL:  No acute distress  HEENT:  Normocephalic/atraumatic, no scleral icterus  CHEST:  Clear to auscultation bilaterally, no wheezes/rales/ronchi, no accessory muscle use  HEART:  Regular rate and rhythm, no murmurs/rubs/gallops  ABDOMEN:  Soft, non-tender, non-distended, normoactive bowel sounds,  no masses, no hepato-splenomegaly, no signs of chronic liver disease  EXTREMITIES: No cyanosis, clubbing, or edema  SKIN:  No rash/erythema/ecchymoses/petechiae/wounds/abscess/warm/dry  NEURO:  Alert and oriented x 3, no asterixis    Vital Signs:  Vital Signs Last 24 Hrs  T(C): 36.9 (20 Nov 2020 16:56), Max: 36.9 (20 Nov 2020 16:56)  T(F): 98.4 (20 Nov 2020 16:56), Max: 98.4 (20 Nov 2020 16:56)  HR: 93 (20 Nov 2020 16:56) (93 - 93)  BP: 159/78 (20 Nov 2020 16:56) (159/78 - 159/78)  BP(mean): --  RR: 18 (20 Nov 2020 16:56) (18 - 18)  SpO2: 97% (20 Nov 2020 16:56) (97% - 97%)  Daily Height in cm: 185.42 (20 Nov 2020 16:56)    Daily     LABS:                        Imaging:           Chief Complaint:  Patient is a 64y old  Male who presents with a chief complaint of     HPI:  Mr. Segundo is a 65yo M with PMH of DM, HLD, GERD, HFpEF with mild LV diastolic dysfunction, and decompensated JEAN cirrhosis, duodenal ulcer, GAVE and duodenal AVM s/p APC (last on 10/11/19), SHENG, s/p liver transplant (7/2/20). Post-op course c/b tacrolimus toxicity and hospital delirium improving after stopping CNI and replacing w/ everolimus.   Patient now presents with worsening LE swelling and right foot wound concerning for OM.    Patient reports longstanding non-healing right heel ulcer. Was follows by podiatry with debridement as outpatient with no improvement. No fever, chills, draining pus, no pain on weight bearing. Adherent to his immunosuppressive meds. Of note: patient with history of ESBL UTI and VRE bacteremia.     Allergies:  codeine (Anaphylaxis)    Home Medications:  aspirin 81 mg oral delayed release tablet: 1 tab(s) orally once a day (11 Aug 2020 16:31)  collagenase 250 units/g topical ointment: 1 application topically once a day (11 Aug 2020 16:31)  diphenhydrAMINE 25 mg oral capsule: 1 cap(s) orally , As needed, Insomnia (11 Aug 2020 16:31)  everolimus 0.75 mg oral tablet: 6 tab(s) orally  (11 Aug 2020 16:31)  HumaLOG KwikPen 100 units/mL injectable solution: 6 unit(s) injectable 3 times a day (before meals) (11 Aug 2020 16:31)  insulin glargine: 6 unit(s) subcutaneous once a day (at bedtime) (11 Aug 2020 16:31)  magnesium oxide 400 mg (241.3 mg elemental magnesium) oral tablet: 2 tab(s) orally 2 times a day (with meals) (11 Aug 2020 16:31)  mycophenolate mofetil 250 mg oral capsule: 2 cap(s) orally 2 times a day (11 Aug 2020 16:31)  nystatin 100,000 units/g topical cream: 1 application topically 2 times a day (11 Aug 2020 16:31)  nystatin 100,000 units/mL oral suspension: 5 milliliter(s) orally 4 times a day (11 Aug 2020 16:31)  pantoprazole 40 mg oral delayed release tablet: 1 tab(s) orally once a day (before a meal) (11 Aug 2020 16:31)  predniSONE 5 mg oral tablet: 1 tab(s) orally once a day (11 Aug 2020 16:31)  sulfamethoxazole-trimethoprim 400 mg-80 mg oral tablet: 1 tab(s) orally once a day (11 Aug 2020 16:31)  tamsulosin 0.4 mg oral capsule: 2 cap(s) orally once a day (at bedtime) (11 Aug 2020 16:31)  valGANciclovir 450 mg oral tablet: 2 tab(s) orally once a day (11 Aug 2020 16:31)    Hospital Medications:      PMHX/PSHX:  GIB (gastrointestinal bleeding)    GERD with esophagitis    Hepatic encephalopathy    Obesity    Fatty liver disease, nonalcoholic    Renal stones    Hypertension    Neuropathy    Hypercholesteremia    Diabetes    S/P cholecystectomy    No significant past surgical history        Family history:  Family history of type 2 diabetes mellitus    Family history of hypertension    Family history of stomach cancer    No pertinent family history in first degree relatives        Denies family history of colon cancer/polyps, stomach cancer/polyps, pancreatic cancer/masses, liver cancer/disease, ovarian cancer and endometrial cancer.    Social History:     Tob: Denies  EtOH: Denies  Illicit Drugs: Denies    ROS:   General:  No wt loss, fevers, chills, night sweats, fatigue  Eyes:  Good vision, no reported pain  ENT:  No sore throat, pain, runny nose, dysphagia  CV:  No pain, palpitations, hypo/hypertension  Pulm:  No dyspnea, cough, tachypnea, wheezing  GI:  No pain, No nausea, No vomiting, No diarrhea, No constipation, No weight loss, No fever, No pruritis, No rectal bleeding, No tarry stools, No dysphagia,  :  No pain, bleeding, incontinence, nocturia  Muscle:  No pain, weakness  Neuro:  No weakness, tingling, memory problems  Psych:  No fatigue, insomnia, mood problems, depression  Endocrine:  No polyuria, polydipsia, cold/heat intolerance  Heme:  No petechiae, ecchymosis, easy bruisability  Skin:  No rash, tattoos, scars, edema    PHYSICAL EXAM:   GENERAL:  No acute distress  HEENT:  Normocephalic/atraumatic, no scleral icterus  CHEST:  Clear to auscultation bilaterally, no wheezes/rales/ronchi, no accessory muscle use  HEART:  Regular rate and rhythm, no murmurs/rubs/gallops  ABDOMEN:  Soft, non-tender, non-distended, normoactive bowel sounds,  no masses, no hepato-splenomegaly, no signs of chronic liver disease  EXTREMITIES: RLE with large 4x4 cm ulcer, with overlying granulation tissue and surrounding erythema, some visible bone. +1 BLE edema.  SKIN:  No rash/erythema/ecchymoses/petechiae/wounds/abscess/warm/dry  NEURO:  Alert and oriented x 3, no asterixis    Vital Signs:  Vital Signs Last 24 Hrs  T(C): 36.9 (20 Nov 2020 16:56), Max: 36.9 (20 Nov 2020 16:56)  T(F): 98.4 (20 Nov 2020 16:56), Max: 98.4 (20 Nov 2020 16:56)  HR: 93 (20 Nov 2020 16:56) (93 - 93)  BP: 159/78 (20 Nov 2020 16:56) (159/78 - 159/78)  BP(mean): --  RR: 18 (20 Nov 2020 16:56) (18 - 18)  SpO2: 97% (20 Nov 2020 16:56) (97% - 97%)  Daily Height in cm: 185.42 (20 Nov 2020 16:56)    Daily     LABS:                        Imaging:

## 2020-11-20 NOTE — CONSULT NOTE ADULT - ASSESSMENT
64M w/ R foot posterior heel wound  - Pt seen and evaluated  - Afebrile, labs pending  - R foot posterior heel wound to bone, 3.0x3.0, depth to bone, wound bed fibrotic, sanguinous drainage, no malodor, pierwound erythematous, etiology 2/2 pressure  - Excisional debridement of R foot posterior heel wound to the level of but not beyond subq, probes 3cm distally as well as to bone  - recommend pain medication   - Posterior heel wound cultured by ED  - Ordered MRI of R foot to r/o OM of calc; Ordered LETICIA/PVR to r/o PVD  - Plan pending MRI  - d/w attending

## 2020-11-20 NOTE — CONSULT NOTE ADULT - ASSESSMENT
#S/p liver transplant 7/2:   OR details:  - L groin cutdown for vv bypass   - anastomosis: bicaval end-end/CBD duct-duct/HA & PV end-end, all standard anatomy.    - IOC with good flow  - Simulect induction. 500mg Solumedrol  - JPs x3 with T-Tube  - 14U pRBC, 14U FFP, 10 PLT, 11 CRYO, 500mL returned from cell saver, EBL 5L, UOP 1100mL  Recipient Info:   ABO: A  CMV:  Neg  EBV Positive  Donor:  Donor ID:  [id number] Match: [id number]  Age: 29 ABO:  A1 High Risk:   No COD: Cardiovascular  Anti CMV positive, EBV IgG- positive  HepBcAb-neg, Hepatitis C-DANA- neg, Hepatitis C ab-neg  # SHENG - Please have patient on a CPAP at night.    #Halluciniations/agitation/AMS/tremors – due to CNI toxicity, improving now that CNI discontinued and replaced with everolimus  #Sacral decubitus ulcer: stage I, does not appear infected  #R posterior heel wound w/ overlying eschar –Local wound care. no signs of infections, XR neg for gas, evaluated by podiatry and ID. MRI w/o contrast limited, but no overt signs of active infection.  #GI bleed: Resolved. Hgb stable with no overt bleeding post-transplant. 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 GAVE and PHG likely improved pos ttransplant.  #ESBL UTI with h/o prostatic abscess: Resolved with IV abx  #VRE bacteremia: Resolved with Linezolid and meropenem    63yo M with PMH of DM, HLD, GERD, HFpEF with mild LV diastolic dysfunction, and decompensated JEAN cirrhosis, duodenal ulcer, GAVE and duodenal AVM s/p APC (last on 10/11/19), SHENG, s/p liver transplant (7/2/20). Post-op course c/b tacrolimus toxicity and hospital delirium improving after stopping CNI and replacing w/ everolimus.   Patient now presents with worsening LE swelling and right foot wound concerning for OM.    # Suspected OM - with non healing RLE ulcer with visible bone. No systemic signs of infection. Given patient's history with ESBL UTI and VRE bacteremia would neet broad abx coverage.   #S/p liver transplant 7/2:   OR details:  - L groin cutdown for vv bypass   - anastomosis: bicaval end-end/CBD duct-duct/HA & PV end-end, all standard anatomy.    - IOC with good flow  - Simulect induction. 500mg Solumedrol  - JPs x3 with T-Tube  - 14U pRBC, 14U FFP, 10 PLT, 11 CRYO, 500mL returned from cell saver, EBL 5L, UOP 1100mL  Recipient Info:   ABO: A  CMV:  Neg  EBV Positive  Donor:  Donor ID:  [id number] Match: [id number]  Age: 29 ABO:  A1 High Risk:   No COD: Cardiovascular  Anti CMV positive, EBV IgG- positive  HepBcAb-neg, Hepatitis C-DANA- neg, Hepatitis C ab-neg  # SHENG - Please have patient on a CPAP at night.  #Sacral decubitus ulcer: stage I, does not appear infected  # DM2      Recommendations:  - Please consult transplant ID  - Would consider meropenem for empiric abx coverage  - No need for reduction of immunosuppressive medications  - MRI RLE  - CMP, CBC, INR     Hepatology will follow with you.    Thank you for involving us in the care of this patient. Please reach out if any further questions.    Adonis Rubio, PGY-4  GI Fellow    Available on [employer] Teams  Pager [phone] ([facility]) or [phone] ([facility])  After 5PM/Weekends, please contact the on-call GI fellow: [phone]  Available through [employer] Teams

## 2020-11-20 NOTE — H&P ADULT - NSHPREVIEWOFSYSTEMS_GEN_ALL_CORE
Gen: No weight changes, fatigue, fevers/chills, weakness  Skin: R heel ulcer   Head/Eyes/Ears/Mouth: No headache; Normal hearing; Normal vision w/o blurriness; No sinus pain/discomfort, sore throat  Respiratory: No dyspnea, cough, wheezing, hemoptysis  CV: No chest pain, PND, orthopnea  GI: Mild abdominal pain at surgical incision site; denies diarrhea, constipation, nausea, vomiting, melena, hematochezia  : No increased frequency, dysuria, hematuria, nocturia  MSK: No joint pain/swelling; no back pain; no edema  Neuro: No dizziness/lightheadedness, weakness, seizures, numbness, tingling  Heme: No easy bruising or bleeding  Endo: No heat/cold intolerance  Psych: No significant nervousness, anxiety, stress, depression  All other systems were reviewed and are negative, except as noted.

## 2020-11-20 NOTE — ED ADULT TRIAGE NOTE - CHIEF COMPLAINT QUOTE
right foot wound not getting better right foot wound not getting better  swelling legs bilaterally h/o cirrhosis

## 2020-11-20 NOTE — CONSULT NOTE ADULT - SUBJECTIVE AND OBJECTIVE BOX
Patient is a 64y old  Male who presents with a chief complaint of     HPI: 64y M with PMHx of liver transplant July'20, presents to the ED c/o worsening R foot ulcer for the last 2 weeks. Pt visited wound check dr alea for ulcer, was advised to come to the ED. Ulcer appeared in April'20 but has worsened in the last 2 weeks. Pt states pain is 6/10. Pt also endorses swelling in legs for  past 2 weeks and L calf pain.  Pt is on immunosuppressant, was on antibiotic 2 weeks ago for foot wound,  and had a debridement unknown date (Dr. REYMUNDO Hoffman). Denies drainage or foul smell, f/c, HA, dizziness, CP.      PAST MEDICAL & SURGICAL HISTORY:  GIB (gastrointestinal bleeding)    GERD with esophagitis  Gastritis &amp; Non Bleeding Ulcers    Hepatic encephalopathy    Obesity    Fatty liver disease, nonalcoholic    Renal stones  25 years ago    Hypertension    Neuropathy    Hypercholesteremia    Diabetes    S/P cholecystectomy        MEDICATIONS  (STANDING):    MEDICATIONS  (PRN):      Allergies    codeine (Anaphylaxis)    Intolerances        VITALS:    Vital Signs Last 24 Hrs  T(C): 36.9 (20 Nov 2020 18:43), Max: 36.9 (20 Nov 2020 16:56)  T(F): 98.5 (20 Nov 2020 18:43), Max: 98.5 (20 Nov 2020 18:43)  HR: 84 (20 Nov 2020 18:43) (84 - 93)  BP: 154/76 (20 Nov 2020 18:43) (154/76 - 159/78)  BP(mean): --  RR: 19 (20 Nov 2020 18:43) (18 - 19)  SpO2: 100% (20 Nov 2020 18:43) (97% - 100%)    LABS:                CAPILLARY BLOOD GLUCOSE              LOWER EXTREMITY PHYSICAL EXAM:    Vascular: DP/PT non-palpable 2/2 edema R foot, L foot unna boot left clean dry and intact, CFT <3 seconds B/L, Temperature gradient R warm to warm  Neuro: Epicritic sensation intact to the level of heel, B/L.  Musculoskeletal/Ortho: R foot pain on palp of posterior heel  Skin: R foot posterior heel wound to bone, 3.0x3.0, depth to bone, wound bed fibrotic, sanguinous drainage, no malodor, pierwound erythematous, etiology 2/2 pressure      RADIOLOGY & ADDITIONAL STUDIES:

## 2020-11-20 NOTE — H&P ADULT - HISTORY OF PRESENT ILLNESS
64M with IDDM, HLD, obesity, HFpEF with mild LV diastolic dysfunction, MGUS, chronic anemia with a history of duodenal ulcer 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  Known history of non healing R heel ulcer. Followed by podiatry outpatient Dr. Patel, had recent debridement wound cx grew ec faecalis, pseudomonas, enterococcus. Tx with cipro. No improvement post debridement and PO antibiotic treatment.  Admitted for further management     Seen and examined in ED. Afebrile, Stable VSS.

## 2020-11-20 NOTE — H&P ADULT - ATTENDING COMMENTS
admitted w likely osteomyelitis.   ID consulted  will get MRI  IV abx started.   Immuno: everolimus/cellcept/pred

## 2020-11-20 NOTE — ED CLERICAL - NS ED CLERK NOTE PRE-ARRIVAL INFORMATION; ADDITIONAL PRE-ARRIVAL INFORMATION
CC/Reason For referral: liver transplant july 2020. suspected osteomyelitis of right foot   Preferred Consultant(if applicable):  Who admits for you (if needed):  Do you have documents you would like to fax over?  Would you still like to speak to an ED attending?

## 2020-11-20 NOTE — ED ADULT NURSE NOTE - OBJECTIVE STATEMENT
63 yo male with PMH liver transplant in July 2020 on immunosuppression medications, DM presents to the ED c/o right heel wound and bilateral leg swelling x2 weeks. patient states his home visiting nurse noticed increased swelling to bilateral legs and was concerned it was related right heel DM ulcer wound infection. liver transplant surgeon wanted patient to get admitted for MRI of right foot to r/o osteomyelitis. patient is AAOx3. lung sounds CTA bilaterally. cap refill <3sec. +2 dorsal pulses noted bilaterally. +4 pitting edema to bilateral lower extremities with pain on palpation. right heel wound noted, [id number] with no drainage noted. patient denies fevers, chills, N/V/D, abdominal pain, chest pain, SOB, HA, dizziness cough, back pain, dysuria, numbness or tingling. VSS. MD and transplant team at the bedside. patient to be admitted. will continue to monitor. call bell in reach.

## 2020-11-20 NOTE — ED PROVIDER NOTE - ATTENDING CONTRIBUTION TO CARE
64y M with PMHx of liver transplant July'20, presents to the ED c/o worsening R foot ulcer likely infected seen by podiatry, culture sent, vss, discussed with transplant team want patient admitted to their service. iv abx, films, labs, mri in am.

## 2020-11-20 NOTE — H&P ADULT - ASSESSMENT
64M with IDDM, HLD, obesity, HFpEF with mild LV diastolic dysfunction, MGUS, chronic anemia with a history of duodenal ulcer 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    Plan:   - Admit to Transplant Surgery   - Podiatry consulted; reccs appreciated  - ID consulted  - bld cxs sent  - wound cx sent  - Start Zosyn  - MRI of RLE to r/o OM    [] s/p OLT  - continue current Immuno: MMF 500mg bid, Everolimus 3/2mg, Pred 5  - PPx: bactrim/valcyte    [] DM  - resume home meds

## 2020-11-20 NOTE — ED PROVIDER NOTE - OBJECTIVE STATEMENT
64y M with PMHx of liver transplant July'20, presents to the ED c/o worsening R foot ulcer for the last 2 weeks. Pt visited wound check dr today for ulcer, was advised to come to the ED. Ulcer appeared in April'20 but has worsened in the last 2 weeks. Pt states pain is 6/10. Pt also endorses swelling in legs for  past 2 weeks and L calf pain.  Pt is on immunosuppressant, was on antibiotic 2 weeks ago for foot wound,  and had a debridement unknown date (Dr. REYMUNDO Hoffman). Denies drainage or foul smell, f/c, HA, dizziness, CP.

## 2020-11-20 NOTE — H&P ADULT - NSHPPHYSICALEXAM_GEN_ALL_CORE
Vital Signs Last 24 Hrs  T(C): 36.9 (20 Nov 2020 18:43), Max: 36.9 (20 Nov 2020 16:56)  T(F): 98.5 (20 Nov 2020 18:43), Max: 98.5 (20 Nov 2020 18:43)  HR: 84 (20 Nov 2020 18:43) (84 - 93)  BP: 154/76 (20 Nov 2020 18:43) (154/76 - 159/78)  BP(mean): --  RR: 19 (20 Nov 2020 18:43) (18 - 19)  SpO2: 100% (20 Nov 2020 18:43) (97% - 100%)    Physical Exam:   GENERAL:  No acute distress  HEENT:  Normocephalic/atraumatic, no scleral icterus  CHEST:  Clear to auscultation bilaterally, no wheezes/rales/ronchi, no accessory muscle use  HEART:  Regular rate and rhythm, no murmurs/rubs/gallops  ABDOMEN:  Soft, non-tender, non-distended,  well healed incision  EXTREMITIES: RLE with large 4x4 cm ulcer, with overlying granulation tissue and surrounding erythema, some visible bone. +1 BLE edema.  SKIN:  No rash/erythema/ecchymoses/petechiae/wounds/abscess/warm/dry  NEURO:  Alert and oriented x 3, no asterixis

## 2020-11-20 NOTE — ED PROVIDER NOTE - CLINICAL SUMMARY MEDICAL DECISION MAKING FREE TEXT BOX
Teena Arredondo (MD): 64y M differential diagnosis: Pressure Ulcer most likely infected. will get basic labs, CRP, ESR, XR of foot, podiatry consult and antibiotics.

## 2020-11-21 LAB
A1C WITH ESTIMATED AVERAGE GLUCOSE RESULT: 8.6 % — HIGH (ref 4–5.6)
ALBUMIN SERPL ELPH-MCNC: 3.1 G/DL — LOW (ref 3.3–5)
ALP SERPL-CCNC: 85 U/L — SIGNIFICANT CHANGE UP (ref 40–120)
ALT FLD-CCNC: 14 U/L — SIGNIFICANT CHANGE UP (ref 10–45)
ANION GAP SERPL CALC-SCNC: 10 MMOL/L — SIGNIFICANT CHANGE UP (ref 5–17)
AST SERPL-CCNC: 16 U/L — SIGNIFICANT CHANGE UP (ref 10–40)
BASOPHILS # BLD AUTO: 0.02 K/UL — SIGNIFICANT CHANGE UP (ref 0–0.2)
BASOPHILS NFR BLD AUTO: 0.6 % — SIGNIFICANT CHANGE UP (ref 0–2)
BILIRUB SERPL-MCNC: 0.1 MG/DL — LOW (ref 0.2–1.2)
BUN SERPL-MCNC: 27 MG/DL — HIGH (ref 7–23)
CALCIUM SERPL-MCNC: 8.8 MG/DL — SIGNIFICANT CHANGE UP (ref 8.4–10.5)
CHLORIDE SERPL-SCNC: 107 MMOL/L — SIGNIFICANT CHANGE UP (ref 96–108)
CO2 SERPL-SCNC: 23 MMOL/L — SIGNIFICANT CHANGE UP (ref 22–31)
CREAT SERPL-MCNC: 1.36 MG/DL — HIGH (ref 0.5–1.3)
EOSINOPHIL # BLD AUTO: 0.04 K/UL — SIGNIFICANT CHANGE UP (ref 0–0.5)
EOSINOPHIL NFR BLD AUTO: 1.2 % — SIGNIFICANT CHANGE UP (ref 0–6)
ERYTHROCYTE [SEDIMENTATION RATE] IN BLOOD: 65 MM/HR — HIGH (ref 0–20)
ESTIMATED AVERAGE GLUCOSE: 200 MG/DL — HIGH (ref 68–114)
GLUCOSE SERPL-MCNC: 165 MG/DL — HIGH (ref 70–99)
HCT VFR BLD CALC: 24.5 % — LOW (ref 39–50)
HCT VFR BLD CALC: 25.5 % — LOW (ref 39–50)
HGB BLD-MCNC: 7.8 G/DL — LOW (ref 13–17)
HGB BLD-MCNC: 8.3 G/DL — LOW (ref 13–17)
IMM GRANULOCYTES NFR BLD AUTO: 0.6 % — SIGNIFICANT CHANGE UP (ref 0–1.5)
INR BLD: 1.08 RATIO — SIGNIFICANT CHANGE UP (ref 0.88–1.16)
LYMPHOCYTES # BLD AUTO: 0.58 K/UL — LOW (ref 1–3.3)
LYMPHOCYTES # BLD AUTO: 17 % — SIGNIFICANT CHANGE UP (ref 13–44)
MAGNESIUM SERPL-MCNC: 1.9 MG/DL — SIGNIFICANT CHANGE UP (ref 1.6–2.6)
MCHC RBC-ENTMCNC: 29 PG — SIGNIFICANT CHANGE UP (ref 27–34)
MCHC RBC-ENTMCNC: 29.2 PG — SIGNIFICANT CHANGE UP (ref 27–34)
MCHC RBC-ENTMCNC: 31.8 GM/DL — LOW (ref 32–36)
MCHC RBC-ENTMCNC: 32.5 GM/DL — SIGNIFICANT CHANGE UP (ref 32–36)
MCV RBC AUTO: 89.8 FL — SIGNIFICANT CHANGE UP (ref 80–100)
MCV RBC AUTO: 91.1 FL — SIGNIFICANT CHANGE UP (ref 80–100)
MONOCYTES # BLD AUTO: 0.23 K/UL — SIGNIFICANT CHANGE UP (ref 0–0.9)
MONOCYTES NFR BLD AUTO: 6.7 % — SIGNIFICANT CHANGE UP (ref 2–14)
NEUTROPHILS # BLD AUTO: 2.52 K/UL — SIGNIFICANT CHANGE UP (ref 1.8–7.4)
NEUTROPHILS NFR BLD AUTO: 73.9 % — SIGNIFICANT CHANGE UP (ref 43–77)
NRBC # BLD: 0 /100 WBCS — SIGNIFICANT CHANGE UP (ref 0–0)
NRBC # BLD: 0 /100 WBCS — SIGNIFICANT CHANGE UP (ref 0–0)
PLATELET # BLD AUTO: 131 K/UL — LOW (ref 150–400)
PLATELET # BLD AUTO: 141 K/UL — LOW (ref 150–400)
POTASSIUM SERPL-MCNC: 4.2 MMOL/L — SIGNIFICANT CHANGE UP (ref 3.5–5.3)
POTASSIUM SERPL-SCNC: 4.2 MMOL/L — SIGNIFICANT CHANGE UP (ref 3.5–5.3)
PROT SERPL-MCNC: 5.5 G/DL — LOW (ref 6–8.3)
PROTHROM AB SERPL-ACNC: 12.9 SEC — SIGNIFICANT CHANGE UP (ref 10.6–13.6)
RBC # BLD: 2.69 M/UL — LOW (ref 4.2–5.8)
RBC # BLD: 2.84 M/UL — LOW (ref 4.2–5.8)
RBC # FLD: 12.8 % — SIGNIFICANT CHANGE UP (ref 10.3–14.5)
RBC # FLD: 12.9 % — SIGNIFICANT CHANGE UP (ref 10.3–14.5)
SODIUM SERPL-SCNC: 140 MMOL/L — SIGNIFICANT CHANGE UP (ref 135–145)
WBC # BLD: 2.67 K/UL — LOW (ref 3.8–10.5)
WBC # BLD: 3.41 K/UL — LOW (ref 3.8–10.5)
WBC # FLD AUTO: 2.67 K/UL — LOW (ref 3.8–10.5)
WBC # FLD AUTO: 3.41 K/UL — LOW (ref 3.8–10.5)

## 2020-11-21 PROCEDURE — 99223 1ST HOSP IP/OBS HIGH 75: CPT

## 2020-11-21 PROCEDURE — 99232 SBSQ HOSP IP/OBS MODERATE 35: CPT | Mod: GC,24

## 2020-11-21 PROCEDURE — 99223 1ST HOSP IP/OBS HIGH 75: CPT | Mod: GC

## 2020-11-21 PROCEDURE — 73718 MRI LOWER EXTREMITY W/O DYE: CPT | Mod: 26,RT

## 2020-11-21 RX ORDER — COLLAGENASE CLOSTRIDIUM HIST. 250 UNIT/G
1 OINTMENT (GRAM) TOPICAL DAILY
Refills: 0 | Status: DISCONTINUED | OUTPATIENT
Start: 2020-11-21 | End: 2020-11-24

## 2020-11-21 RX ORDER — EVEROLIMUS 10 MG/1
2 TABLET ORAL
Refills: 0 | Status: DISCONTINUED | OUTPATIENT
Start: 2020-11-21 | End: 2020-11-24

## 2020-11-21 RX ORDER — SODIUM HYPOCHLORITE 0.125 %
1 SOLUTION, NON-ORAL MISCELLANEOUS DAILY
Refills: 0 | Status: DISCONTINUED | OUTPATIENT
Start: 2020-11-21 | End: 2020-11-24

## 2020-11-21 RX ORDER — MEROPENEM 1 G/30ML
1000 INJECTION INTRAVENOUS EVERY 8 HOURS
Refills: 0 | Status: DISCONTINUED | OUTPATIENT
Start: 2020-11-21 | End: 2020-11-23

## 2020-11-21 RX ORDER — ACETAMINOPHEN 500 MG
650 TABLET ORAL EVERY 6 HOURS
Refills: 0 | Status: DISCONTINUED | OUTPATIENT
Start: 2020-11-21 | End: 2020-11-24

## 2020-11-21 RX ORDER — VALGANCICLOVIR 450 MG/1
450 TABLET, FILM COATED ORAL DAILY
Refills: 0 | Status: DISCONTINUED | OUTPATIENT
Start: 2020-11-21 | End: 2020-11-24

## 2020-11-21 RX ORDER — EVEROLIMUS 10 MG/1
2 TABLET ORAL
Refills: 0 | Status: DISCONTINUED | OUTPATIENT
Start: 2020-11-21 | End: 2020-11-21

## 2020-11-21 RX ADMIN — Medication 650 MILLIGRAM(S): at 14:35

## 2020-11-21 RX ADMIN — EVEROLIMUS 3 MILLIGRAM(S): 10 TABLET ORAL at 08:45

## 2020-11-21 RX ADMIN — MAGNESIUM OXIDE 400 MG ORAL TABLET 800 MILLIGRAM(S): 241.3 TABLET ORAL at 08:45

## 2020-11-21 RX ADMIN — Medication 40 MILLIGRAM(S): at 06:00

## 2020-11-21 RX ADMIN — MEROPENEM 100 MILLIGRAM(S): 1 INJECTION INTRAVENOUS at 17:12

## 2020-11-21 RX ADMIN — Medication 81 MILLIGRAM(S): at 13:37

## 2020-11-21 RX ADMIN — TAMSULOSIN HYDROCHLORIDE 0.4 MILLIGRAM(S): 0.4 CAPSULE ORAL at 22:53

## 2020-11-21 RX ADMIN — Medication 1 TABLET(S): at 13:37

## 2020-11-21 RX ADMIN — MYCOPHENOLATE MOFETIL 500 MILLIGRAM(S): 250 CAPSULE ORAL at 06:00

## 2020-11-21 RX ADMIN — INSULIN GLARGINE 15 UNIT(S): 100 INJECTION, SOLUTION SUBCUTANEOUS at 22:53

## 2020-11-21 RX ADMIN — MEROPENEM 100 MILLIGRAM(S): 1 INJECTION INTRAVENOUS at 06:02

## 2020-11-21 RX ADMIN — EVEROLIMUS 2 MILLIGRAM(S): 10 TABLET ORAL at 20:00

## 2020-11-21 RX ADMIN — Medication 10 UNIT(S): at 08:45

## 2020-11-21 RX ADMIN — Medication 650 MILLIGRAM(S): at 06:42

## 2020-11-21 RX ADMIN — Medication 5 MILLIGRAM(S): at 06:00

## 2020-11-21 RX ADMIN — Medication 650 MILLIGRAM(S): at 13:37

## 2020-11-21 RX ADMIN — Medication 1 APPLICATION(S): at 13:39

## 2020-11-21 RX ADMIN — PANTOPRAZOLE SODIUM 40 MILLIGRAM(S): 20 TABLET, DELAYED RELEASE ORAL at 06:00

## 2020-11-21 RX ADMIN — VALGANCICLOVIR 450 MILLIGRAM(S): 450 TABLET, FILM COATED ORAL at 17:11

## 2020-11-21 RX ADMIN — Medication 650 MILLIGRAM(S): at 06:00

## 2020-11-21 RX ADMIN — Medication 3: at 19:43

## 2020-11-21 RX ADMIN — Medication 10 UNIT(S): at 13:38

## 2020-11-21 RX ADMIN — Medication 10 UNIT(S): at 19:43

## 2020-11-21 NOTE — PROGRESS NOTE ADULT - SUBJECTIVE AND OBJECTIVE BOX
Transplant Surgery - Multidisciplinary Round    OLT  Date: 7/2/20        Present:   Patient seen with multidisciplinary team including Transplant Surgeon: Dr. Chris, Dr. Gray, Dr. Mo.  Nurse Practitioner: Sana Ni and  Flora Akers in am rounds  Disciplines not in attendance will be notified of the plan.     64M with IDDM, HLD, obesity, HFpEF with mild LV diastolic dysfunction, MGUS, chronic anemia with a history of duodenal ulcer 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  Known history of non healing R heel ulcer. Followed by podiatry outpatient Dr. Patel, had recent debridement wound cx grew ec faecalis, pseudomonas, enterococcus. Tx with cipro. No improvement post debridement and PO antibiotic treatment.  Admitted for further management     Interval events  Patient seen and examined. No acute events overnight.   MRI ordered for today   started on Meropenum  Cellcept on hold  Podiatry and ID consulted       Potential Discharge date:    Education:  Medications    Plan of care:  See Below      acetaminophen   Tablet .. 650 milliGRAM(s) Oral every 6 hours PRN  aspirin enteric coated 81 milliGRAM(s) Oral daily  collagenase Ointment 1 Application(s) Topical daily  Dakins Solution - 1/2 Strength 1 Application(s) Topical daily  dextrose 40% Gel 15 Gram(s) Oral once  dextrose 5%. 1000 milliLiter(s) IV Continuous <Continuous>  dextrose 5%. 1000 milliLiter(s) IV Continuous <Continuous>  dextrose 50% Injectable 25 Gram(s) IV Push once  dextrose 50% Injectable 12.5 Gram(s) IV Push once  dextrose 50% Injectable 25 Gram(s) IV Push once  everolimus (ZORTRESS) 2 milliGRAM(s) Oral <User Schedule>  everolimus (ZORTRESS) 2 milliGRAM(s) Oral <User Schedule>  furosemide    Tablet 40 milliGRAM(s) Oral daily  glucagon  Injectable 1 milliGRAM(s) IntraMuscular once  influenza   Vaccine 0.5 milliLiter(s) IntraMuscular once  insulin glargine Injectable (LANTUS) 15 Unit(s) SubCutaneous at bedtime  insulin lispro (ADMELOG) corrective regimen sliding scale   SubCutaneous three times a day before meals  insulin lispro (ADMELOG) corrective regimen sliding scale   SubCutaneous at bedtime  insulin lispro Injectable (ADMELOG) 10 Unit(s) SubCutaneous three times a day before meals  magnesium oxide 800 milliGRAM(s) Oral with breakfast  meropenem  IVPB      meropenem  IVPB 1000 milliGRAM(s) IV Intermittent every 12 hours  pantoprazole    Tablet 40 milliGRAM(s) Oral before breakfast  predniSONE   Tablet 5 milliGRAM(s) Oral daily  tamsulosin 0.4 milliGRAM(s) Oral at bedtime  trimethoprim   80 mG/sulfamethoxazole 400 mG 1 Tablet(s) Oral daily  valGANciclovir 450 milliGRAM(s) Oral daily        Vital Signs Last 24 Hrs  T(C): 37.2 (21 Nov 2020 09:00), Max: 37.3 (21 Nov 2020 01:00)  T(F): 98.9 (21 Nov 2020 09:00), Max: 99.2 (21 Nov 2020 01:00)  HR: 88 (21 Nov 2020 09:00) (84 - 93)  BP: 127/63 (21 Nov 2020 09:00) (127/63 - 159/78)  BP(mean): --  RR: 18 (21 Nov 2020 09:00) (16 - 19)  SpO2: 98% (21 Nov 2020 09:00) (97% - 100%)    I&O's Summary    20 Nov 2020 07:01  -  21 Nov 2020 07:00  --------------------------------------------------------  IN: 460 mL / OUT: 1825 mL / NET: -1365 mL    21 Nov 2020 07:01  -  21 Nov 2020 13:25  --------------------------------------------------------  IN: 240 mL / OUT: 1100 mL / NET: -860 mL                              7.8    3.41  )-----------( 141      ( 21 Nov 2020 09:44 )             24.5     11-21    140  |  107  |  27<H>  ----------------------------<  165<H>  4.2   |  23  |  1.36<H>    Ca    8.8      21 Nov 2020 05:42  Mg     1.9     11-21    TPro  5.5<L>  /  Alb  3.1<L>  /  TBili  0.1<L>  /  DBili  x   /  AST  16  /  ALT  14  /  AlkPhos  85  11-21        Gen: No weight changes, fatigue, fevers/chills, weakness  Skin: R heel ulcer   Head/Eyes/Ears/Mouth: No headache; Normal hearing; Normal vision w/o blurriness; No sinus pain/discomfort, sore throat  Respiratory: No dyspnea, cough, wheezing, hemoptysis  CV: No chest pain, PND, orthopnea  GI: Mild abdominal pain at surgical incision site; denies diarrhea, constipation, nausea, vomiting, melena, hematochezia  : No increased frequency, dysuria, hematuria, nocturia  MSK: No joint pain/swelling; no back pain; no edema  Neuro: No dizziness/lightheadedness, weakness, seizures, numbness, tingling  Heme: No easy bruising or bleeding  Endo: No heat/cold intolerance  Psych: No significant nervousness, anxiety, stress, depression  All other systems were reviewed and are negative, except as noted      PHYSICAL EXAM:  GENERAL:  No acute distress  HEENT:  Normocephalic/atraumatic, no scleral icterus  CHEST:  Clear to auscultation bilaterally, no wheezes/rales/ronchi, no accessory muscle use  HEART:  Regular rate and rhythm, no murmurs/rubs/gallops  ABDOMEN:  Soft, non-tender, non-distended,  well healed incision  EXTREMITIES: RLE with large 4x4 cm ulcer, with overlying granulation tissue and surrounding erythema, some visible bone. +1 BLE edema.  SKIN:  No rash/erythema/ecchymoses/petechiae/wounds/abscess/warm/dry  NEURO:  Alert and oriented x 3, no asterixis

## 2020-11-21 NOTE — PROGRESS NOTE ADULT - SUBJECTIVE AND OBJECTIVE BOX
Patient is a 64y old  Male who presents with a chief complaint of R heel wound (20 Nov 2020 19:05)       INTERVAL HPI/OVERNIGHT EVENTS:  Patient seen and evaluated at bedside.  Pt is resting comfortable in NAD. Denies N/V/F/C.     Allergies    codeine (Anaphylaxis)    Intolerances        Vital Signs Last 24 Hrs  T(C): 37.2 (21 Nov 2020 09:00), Max: 37.3 (21 Nov 2020 01:00)  T(F): 98.9 (21 Nov 2020 09:00), Max: 99.2 (21 Nov 2020 01:00)  HR: 88 (21 Nov 2020 09:00) (84 - 93)  BP: 127/63 (21 Nov 2020 09:00) (127/63 - 159/78)  BP(mean): --  RR: 18 (21 Nov 2020 09:00) (16 - 19)  SpO2: 98% (21 Nov 2020 09:00) (97% - 100%)    LABS:                        7.8    3.41  )-----------( 141      ( 21 Nov 2020 09:44 )             24.5     11-21    140  |  107  |  27<H>  ----------------------------<  165<H>  4.2   |  23  |  1.36<H>    Ca    8.8      21 Nov 2020 05:42  Mg     1.9     11-21    TPro  5.5<L>  /  Alb  3.1<L>  /  TBili  0.1<L>  /  DBili  x   /  AST  16  /  ALT  14  /  AlkPhos  85  11-21    PT/INR - ( 21 Nov 2020 05:43 )   PT: 12.9 sec;   INR: 1.08 ratio         PTT - ( 20 Nov 2020 19:18 )  PTT:31.0 sec    CAPILLARY BLOOD GLUCOSE      POCT Blood Glucose.: 121 mg/dL (21 Nov 2020 08:42)  POCT Blood Glucose.: 197 mg/dL (20 Nov 2020 23:39)  POCT Blood Glucose.: 105 mg/dL (20 Nov 2020 21:39)      Lower Extremity Physical Exam:  Vascular: DP/PT non-palpable 2/2 edema R foot, L foot unna boot left clean dry and intact, CFT <3 seconds B/L, Temperature gradient R warm to warm  Neuro: Epicritic sensation intact to the level of heel, B/L.  Musculoskeletal/Ortho: R foot pain on palp of posterior heel  Skin: R foot posterior heel wound to bone, 3.0x3.0, depth to bone, wound bed fibrotic, sanguinous drainage, no malodor, pierwound erythematous, etiology 2/2 pressure  Probing to heel bone with very friable/soft density    RADIOLOGY & ADDITIONAL TESTS:    < from: Xray Foot AP + Lateral + Oblique, Right (11.20.20 @ 19:17) >    ******PRELIMINARY REPORT******    ******PRELIMINARY REPORT******          EXAM:  FOOT COMPLETE RIGHT (MIN 3 VIEW)                            PROCEDURE DATE:  11/20/2020      ******PRELIMINARY REPORT******    ******PRELIMINARY REPORT******              INTERPRETATION:  No acute fracture. F/u official report.            ******PRELIMINARY REPORT******    ******PRELIMINARY REPORT******          SHAYAN DURAN MD; Resident Radiology    < end of copied text >

## 2020-11-21 NOTE — PROGRESS NOTE ADULT - ASSESSMENT
64M with IDDM, HLD, obesity, HFpEF with mild LV diastolic dysfunction, MGUS, chronic anemia with a history of duodenal ulcer 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    Plan:   - Admit to Transplant Surgery   - Podiatry consulted; reccs appreciated  - ID consulted  - bld cxs sent  - wound cx sent  - Start Zosyn  - MRI of RLE to r/o OM f/u results  - Dakins solution 1/2 strength, QD  collagenase QD     [] s/p OLT  -  Immuno: decreases Everolimus to 2 mg BID Pred 5 MMF on hold   - PPx: bactrim, dec valctye to 450 mg QD  evero levels  cbc with diff, cmp, mg/phos daily  everolimus level     [] DM  - fingersticks AC/ HS  - Lantus 15 units QHS  Premeal admelog 10 units   diabetic diet

## 2020-11-21 NOTE — CONSULT NOTE ADULT - SUBJECTIVE AND OBJECTIVE BOX
Patient is a 64y old  Male who presents with a chief complaint of R heel wound (21 Nov 2020 10:12)      HPI:  64M with IDDM, HLD, obesity, HFpEF with mild LV diastolic dysfunction, MGUS, chronic anemia with a history of duodenal ulcer 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  Known history of non healing R heel ulcer. Followed by podiatry outpatient Dr. Patel, had recent debridement wound cx grew ec faecalis, pseudomonas, enterococcus. Tx with cipro. No improvement post debridement and PO antibiotic treatment.  Admitted for further management     In the ED afebrile, normotensive and not tachycardic. XR Foot (11/20) with no fractue, dislocation or evidence of OM. ESR of 65. CRP of 3.73. COVID19 PCR (11/20) negative.      (20 Nov 2020 19:05)     prior hospital charts reviewed [  ]  primary team notes reviewed [  ]  other consultant notes reviewed [  ]    PAST MEDICAL & SURGICAL HISTORY:  GIB (gastrointestinal bleeding)    GERD with esophagitis  Gastritis &amp; Non Bleeding Ulcers    Hepatic encephalopathy    Obesity    Fatty liver disease, nonalcoholic    Renal stones  25 years ago    Hypertension    Neuropathy    Hypercholesteremia    Diabetes    S/P cholecystectomy        Allergies  codeine (Anaphylaxis)    ANTIMICROBIALS (past 90 days)  MEDICATIONS  (STANDING):  meropenem  IVPB   100 mL/Hr IV Intermittent (11-20-20 @ 23:32)    meropenem  IVPB   100 mL/Hr IV Intermittent (11-21-20 @ 06:02)    piperacillin/tazobactam IVPB.   200 mL/Hr IV Intermittent (11-20-20 @ 18:48)    trimethoprim   80 mG/sulfamethoxazole 400 mG   1 Tablet(s) Oral (11-20-20 @ 20:49)    valGANciclovir   900 milliGRAM(s) Oral (11-20-20 @ 20:49)      ANTIMICROBIALS:    meropenem  IVPB    meropenem  IVPB 1000 every 12 hours  trimethoprim   80 mG/sulfamethoxazole 400 mG 1 daily  valGANciclovir 450 daily    OTHER MEDS: MEDICATIONS  (STANDING):  acetaminophen   Tablet .. 650 every 6 hours PRN  aspirin enteric coated 81 daily  dextrose 40% Gel 15 once  dextrose 50% Injectable 25 once  dextrose 50% Injectable 12.5 once  dextrose 50% Injectable 25 once  everolimus (ZORTRESS) 2 <User Schedule>  everolimus (ZORTRESS) 2 <User Schedule>  furosemide    Tablet 40 daily  glucagon  Injectable 1 once  influenza   Vaccine 0.5 once  insulin glargine Injectable (LANTUS) 15 at bedtime  insulin lispro (ADMELOG) corrective regimen sliding scale  three times a day before meals  insulin lispro (ADMELOG) corrective regimen sliding scale  at bedtime  insulin lispro Injectable (ADMELOG) 10 three times a day before meals  pantoprazole    Tablet 40 before breakfast  predniSONE   Tablet 5 daily  tamsulosin 0.4 at bedtime    SOCIAL HISTORY:   hx smoking  non-smoker    FAMILY HISTORY:  Family history of type 2 diabetes mellitus    Family history of hypertension    Family history of stomach cancer      REVIEW OF SYSTEMS  [  ] ROS unobtainable because:    [  ] All other systems negative except as noted below:	    Constitutional:  [ ] fever [ ] chills  [ ] weight loss  [ ] weakness  Skin:  [ ] rash [ ] phlebitis	  Eyes: [ ] icterus [ ] pain  [ ] discharge	  ENMT: [ ] sore throat  [ ] thrush [ ] ulcers [ ] exudates  Respiratory: [ ] dyspnea [ ] hemoptysis [ ] cough [ ] sputum	  Cardiovascular:  [ ] chest pain [ ] palpitations [ ] edema	  Gastrointestinal:  [ ] nausea [ ] vomiting [ ] diarrhea [ ] constipation [ ] pain	  Genitourinary:  [ ] dysuria [ ] frequency [ ] hematuria [ ] discharge [ ] flank pain  [ ] incontinence  Musculoskeletal:  [ ] myalgias [ ] arthralgias [ ] arthritis  [ ] back pain  Neurological:  [ ] headache [ ] seizures  [ ] confusion/altered mental status  Psychiatric:  [ ] anxiety [ ] depression	  Hematology/Lymphatics:  [ ] lymphadenopathy  Endocrine:  [ ] adrenal [ ] thyroid  Allergic/Immunologic:	 [ ] transplant [ ] seasonal    Vital Signs Last 24 Hrs  T(F): 98.9 (11-21-20 @ 09:00), Max: 99.2 (11-21-20 @ 01:00)  Vital Signs Last 24 Hrs  HR: 88 (11-21-20 @ 09:00) (84 - 93)  BP: 127/63 (11-21-20 @ 09:00) (127/63 - 159/78)  RR: 18 (11-21-20 @ 09:00)  SpO2: 98% (11-21-20 @ 09:00) (97% - 100%)  Wt(kg): --    PHYSICAL EXAMINATION:  General: Alert and Awake, NAD  HEENT: PERRL, EOMI, No subconjunctival hemorrhages, Oropharynx Clear, MMM  Neck: Supple, No CHELSEA  Cardiac: RRR, No M/R/G  Resp: CTAB, No Wh/Rh/Ra  Abdomen: NBS, NT/ND, No HSM, No rigidity or guarding  MSK: No LE edema. No stigmata of IE. No evidence of phlebitis. No evidence of synovitis.  : No bob  Skin: No rashes or lesions. Skin is warm and dry to the touch.   Neuro: Alert and Awake. CN 2-12 Grossly intact. Moves all four extremities spontaneously.  Psych: Calm, Pleasant, Cooperative                          7.8    3.41  )-----------( 141      ( 21 Nov 2020 09:44 )             24.5     11-21    140  |  107  |  27<H>  ----------------------------<  165<H>  4.2   |  23  |  1.36<H>    Ca    8.8      21 Nov 2020 05:42  Mg     1.9     11-21    TPro  5.5<L>  /  Alb  3.1<L>  /  TBili  0.1<L>  /  DBili  x   /  AST  16  /  ALT  14  /  AlkPhos  85  11-21    MICROBIOLOGY:        CMV IgG Antibody: <0.20 U/mL (12-04-19 @ 08:33)  Toxoplasma IgG Screen: <3.0 IU/mL (12-04-19 @ 08:33)          RADIOLOGY:    <The imaging below has been reviewed and visualized by me independently. Findings as detailed in report below>    EXAM:  FOOT COMPLETE RIGHT (MIN 3 VIEW)                        PROCEDURE DATE:  11/20/2020    IMPRESSION: No acute fractures or dislocations. No radiographic evidence for osteomyelitis deep to the posterior heel ulcer. Recommend MRI if clinical concern remains, assuming no clinical contraindications.     Patient is a 64y old  Male who presents with a chief complaint of R heel wound (21 Nov 2020 10:12)      HPI:    64M JEAN Cirrhosis s/p OLT (7/2/20 at [facility]), IDDM, MGUS who presented to [facility] on 11/20 with worsening drainage at his right heel with concern for osteomyelitis. With regards to his OLT course patient's post op course c/b VRE bacteremia s/p linezolid course. Patient subsequently developed delirium likely in setting of CNI toxicity (FK discontinued/everolimus initiated 7/27). Patient has since been following with podiatry as outpatient with Dr Patel. Had a debridement in 10/2020 with cultures growing fairly broadly susceptible Enterococcus faecalis, Pseudomonas and Enterobacter. Patient was treated with Ciprofloxacin Patient has continued to have right heel drainage and wound at the heel. Denies fevers. Notes intermittent chills (but this has been present since transplant).     In the ED afebrile, normotensive and not tachycardic. XR Foot (11/20) with no fracture dislocation or evidence of OM. ESR of 65. CRP of 3.73. COVID19 PCR (11/20) negative.     prior hospital charts reviewed [ x ]  primary team notes reviewed [ x ]  other consultant notes reviewed [ x ]    PAST MEDICAL & SURGICAL HISTORY:  GIB (gastrointestinal bleeding)    GERD with esophagitis  Gastritis &amp; Non Bleeding Ulcers    Hepatic encephalopathy    Obesity    Fatty liver disease, nonalcoholic    Renal stones  25 years ago    Hypertension    Neuropathy    Hypercholesteremia    Diabetes    S/P cholecystectomy        Allergies  codeine (Anaphylaxis)    ANTIMICROBIALS (past 90 days)  MEDICATIONS  (STANDING):  meropenem  IVPB   100 mL/Hr IV Intermittent (11-20-20 @ 23:32)    meropenem  IVPB   100 mL/Hr IV Intermittent (11-21-20 @ 06:02)    piperacillin/tazobactam IVPB.   200 mL/Hr IV Intermittent (11-20-20 @ 18:48)    trimethoprim   80 mG/sulfamethoxazole 400 mG   1 Tablet(s) Oral (11-20-20 @ 20:49)    valGANciclovir   900 milliGRAM(s) Oral (11-20-20 @ 20:49)      ANTIMICROBIALS:    meropenem  IVPB    meropenem  IVPB 1000 every 12 hours  trimethoprim   80 mG/sulfamethoxazole 400 mG 1 daily  valGANciclovir 450 daily    OTHER MEDS: MEDICATIONS  (STANDING):  acetaminophen   Tablet .. 650 every 6 hours PRN  aspirin enteric coated 81 daily  dextrose 40% Gel 15 once  dextrose 50% Injectable 25 once  dextrose 50% Injectable 12.5 once  dextrose 50% Injectable 25 once  everolimus (ZORTRESS) 2 <User Schedule>  everolimus (ZORTRESS) 2 <User Schedule>  furosemide    Tablet 40 daily  glucagon  Injectable 1 once  influenza   Vaccine 0.5 once  insulin glargine Injectable (LANTUS) 15 at bedtime  insulin lispro (ADMELOG) corrective regimen sliding scale  three times a day before meals  insulin lispro (ADMELOG) corrective regimen sliding scale  at bedtime  insulin lispro Injectable (ADMELOG) 10 three times a day before meals  pantoprazole    Tablet 40 before breakfast  predniSONE   Tablet 5 daily  tamsulosin 0.4 at bedtime      SOCIAL HISTORY: No smoking, drugs, or alcohol. Lives in [place] with family.     FAMILY HISTORY:  Family history of type 2 diabetes mellitus  Family history of hypertension  Family history of stomach cancer      REVIEW OF SYSTEMS  [  ] ROS unobtainable because:    [  x] All other systems negative except as noted below:	    Constitutional:  [ ] fever [x ] chills  [ ] weight loss  [ ] weakness  Skin:  [ ] rash [ ] phlebitis	  Eyes: [ ] icterus [ ] pain  [ ] discharge	  ENMT: [ ] sore throat  [ ] thrush [ ] ulcers [ ] exudates  Respiratory: [ ] dyspnea [ ] hemoptysis [ ] cough [ ] sputum	  Cardiovascular:  [ ] chest pain [ ] palpitations [ ] edema	  Gastrointestinal:  [ ] nausea [ ] vomiting [ ] diarrhea [ ] constipation [ ] pain	  Genitourinary:  [ ] dysuria [ ] frequency [ ] hematuria [ ] discharge [ ] flank pain  [ ] incontinence  Musculoskeletal:  [ ] myalgias [ ] arthralgias [ ] arthritis  [ ] back pain +right heel wound  Neurological:  [ ] headache [ ] seizures  [ ] confusion/altered mental status  Psychiatric:  [ ] anxiety [ ] depression	  Hematology/Lymphatics:  [ ] lymphadenopathy  Endocrine:  [ ] adrenal [ ] thyroid  Allergic/Immunologic:	 [ ] transplant [ ] seasonal    Vital Signs Last 24 Hrs  T(F): 98.9 (11-21-20 @ 09:00), Max: 99.2 (11-21-20 @ 01:00)  Vital Signs Last 24 Hrs  HR: 88 (11-21-20 @ 09:00) (84 - 93)  BP: 127/63 (11-21-20 @ 09:00) (127/63 - 159/78)  RR: 18 (11-21-20 @ 09:00)  SpO2: 98% (11-21-20 @ 09:00) (97% - 100%)  Wt(kg): --    PHYSICAL EXAMINATION:  General: Alert and Awake, NAD  HEENT: PERRL, EOMI, No subconjunctival hemorrhages, Oropharynx Clear, MMM  Neck: Supple, No CHELSEA  Cardiac: RRR, No M/R/G  Resp: CTAB, No Wh/Rh/Ra  Abdomen: OLT surgical scar well healed. NBS, NT/ND, No HSM, No rigidity or guarding  MSK: R Heel wound with drainage on dressing - no surrounding erythema, no drainage expressed directly from wound, no malodour. 1+ bilateral LE edema. No stigmata of IE. No evidence of phlebitis. No evidence of synovitis.  : No bob  Skin: No rashes or lesions. Skin is warm and dry to the touch.   Neuro: Alert and Awake. CN 2-12 Grossly intact. Moves all four extremities spontaneously.  Psych: Calm, Pleasant, Cooperative                          7.8    3.41  )-----------( 141      ( 21 Nov 2020 09:44 )             24.5     11-21    140  |  107  |  27<H>  ----------------------------<  165<H>  4.2   |  23  |  1.36<H>    Ca    8.8      21 Nov 2020 05:42  Mg     1.9     11-21    TPro  5.5<L>  /  Alb  3.1<L>  /  TBili  0.1<L>  /  DBili  x   /  AST  16  /  ALT  14  /  AlkPhos  85  11-21    MICROBIOLOGY:    CMV IgG Antibody: <0.20 U/mL (12-04-19 @ 08:33)  Toxoplasma IgG Screen: <3.0 IU/mL (12-04-19 @ 08:33)      RADIOLOGY:    <The imaging below has been reviewed and visualized by me independently. Findings as detailed in report below>    EXAM:  FOOT COMPLETE RIGHT (MIN 3 VIEW)                        PROCEDURE DATE:  11/20/2020    IMPRESSION: No acute fractures or dislocations. No radiographic evidence for osteomyelitis deep to the posterior heel ulcer. Recommend MRI if clinical concern remains, assuming no clinical contraindications.

## 2020-11-21 NOTE — PROGRESS NOTE ADULT - ASSESSMENT
64M w/ R foot posterior heel wound  - Pt seen and evaluated  - Afebrile, WBC 3.41, ESR 65, CRP 3.73  - R foot posterior heel wound to bone, 3.0x3.0, depth to bone, wound bed fibrotic, sanguinous drainage, no malodor, pierwound erythematous, etiology 2/2 pressure  - Probing bone with soft/friable density  - Ordered Dakins  - Ordered MRI of R foot to r/o OM of calc; Ordered LETICIA/PVR to r/o PVD  - Consented for right foot partial calcanectomy with graft application. Will tentatively book for next week  - Please evaluate for medical/cardiac optimization for procedure under sedation with local  - Seen at bedside with transplant chief attending Dr. Chris in agreement with plan (appreciated)  - Seen w/ attending

## 2020-11-21 NOTE — CONSULT NOTE ADULT - ASSESSMENT
64M JEAN Cirrhosis s/p OLT (7/2/20 at [facility]), IDDM, MGUS who presented to [facility] on 11/20 with worsening drainage at his right heel with concern for osteomyelitis.    Had a debridement in 10/2020 with cultures growing fairly broadly susceptible Enterococcus faecalis, Pseudomonas and Enterobacter.   Patient was treated with Ciprofloxacin Patient has continued to have right heel drainage and wound at the heel.     COVID19 PCR (11/20) negative.   XR Foot (11/20) with no fracture dislocation or evidence of OM.   ESR of 65.   CRP of 3.73.     Per podiatry evaluation wound probes to bone  High concern for OM  Surgical swab culture sent by podiatry  Can continue Meropenem at this point (would cover all isolates from 10/2020 debridement)  MRI Performed today but patient could not tolerate complete test due to pain; would followup on result - may need repeat imaging depending on quality of images    RECOMMENDATIONS:    #R Heel Wound, Suspected Heel Osteomyelitis  --Increase Meropenem to 1g IV Q8H  --Recommend MRSA/MSSA Nasal PCR (ordered)  --Followup on MRI of R Foot  --Followup on prelim blood cultures and right heel wound cultures  --Work for possible surgical intervention per podiatry    #Liver Transplant Recipient, Prophylactic Antibiotic (CMV +/-)  --Continue Valcyte 450 mg PO Q24H (CrCl currently 50-59)  --Continue Bactrim SS PO QD    I will be away tomorrow. Please contact the Infectious Diseases Office to contact the covering Infectious Diseases Attending.     Eusebio Pérez M.D.  [facility] Division of Infectious Disease  8AM-5PM: Pager Number [phone]  After Hours (or if no response): Please contact the Infectious Diseases Office at [phone]     The above assessment and plan were discussed with transplant surgery team

## 2020-11-22 LAB
ALBUMIN SERPL ELPH-MCNC: 3.1 G/DL — LOW (ref 3.3–5)
ALP SERPL-CCNC: 89 U/L — SIGNIFICANT CHANGE UP (ref 40–120)
ALT FLD-CCNC: 14 U/L — SIGNIFICANT CHANGE UP (ref 10–45)
ANION GAP SERPL CALC-SCNC: 10 MMOL/L — SIGNIFICANT CHANGE UP (ref 5–17)
APTT BLD: 30.1 SEC — SIGNIFICANT CHANGE UP (ref 27.5–35.5)
AST SERPL-CCNC: 14 U/L — SIGNIFICANT CHANGE UP (ref 10–40)
BILIRUB SERPL-MCNC: 0.1 MG/DL — LOW (ref 0.2–1.2)
BUN SERPL-MCNC: 27 MG/DL — HIGH (ref 7–23)
CALCIUM SERPL-MCNC: 8.6 MG/DL — SIGNIFICANT CHANGE UP (ref 8.4–10.5)
CHLORIDE SERPL-SCNC: 107 MMOL/L — SIGNIFICANT CHANGE UP (ref 96–108)
CO2 SERPL-SCNC: 22 MMOL/L — SIGNIFICANT CHANGE UP (ref 22–31)
CREAT SERPL-MCNC: 1.46 MG/DL — HIGH (ref 0.5–1.3)
GLUCOSE SERPL-MCNC: 280 MG/DL — HIGH (ref 70–99)
HCT VFR BLD CALC: 26.1 % — LOW (ref 39–50)
HGB BLD-MCNC: 8.2 G/DL — LOW (ref 13–17)
INR BLD: 1.04 RATIO — SIGNIFICANT CHANGE UP (ref 0.88–1.16)
MAGNESIUM SERPL-MCNC: 1.9 MG/DL — SIGNIFICANT CHANGE UP (ref 1.6–2.6)
MCHC RBC-ENTMCNC: 28.7 PG — SIGNIFICANT CHANGE UP (ref 27–34)
MCHC RBC-ENTMCNC: 31.4 GM/DL — LOW (ref 32–36)
MCV RBC AUTO: 91.3 FL — SIGNIFICANT CHANGE UP (ref 80–100)
MRSA PCR RESULT.: SIGNIFICANT CHANGE UP
NRBC # BLD: 0 /100 WBCS — SIGNIFICANT CHANGE UP (ref 0–0)
PHOSPHATE SERPL-MCNC: 3.3 MG/DL — SIGNIFICANT CHANGE UP (ref 2.5–4.5)
PLATELET # BLD AUTO: 139 K/UL — LOW (ref 150–400)
POTASSIUM SERPL-MCNC: 4.7 MMOL/L — SIGNIFICANT CHANGE UP (ref 3.5–5.3)
POTASSIUM SERPL-SCNC: 4.7 MMOL/L — SIGNIFICANT CHANGE UP (ref 3.5–5.3)
PROT SERPL-MCNC: 5.5 G/DL — LOW (ref 6–8.3)
PROTHROM AB SERPL-ACNC: 12.4 SEC — SIGNIFICANT CHANGE UP (ref 10.6–13.6)
RBC # BLD: 2.86 M/UL — LOW (ref 4.2–5.8)
RBC # FLD: 12.9 % — SIGNIFICANT CHANGE UP (ref 10.3–14.5)
S AUREUS DNA NOSE QL NAA+PROBE: SIGNIFICANT CHANGE UP
SODIUM SERPL-SCNC: 139 MMOL/L — SIGNIFICANT CHANGE UP (ref 135–145)
WBC # BLD: 2.6 K/UL — LOW (ref 3.8–10.5)
WBC # FLD AUTO: 2.6 K/UL — LOW (ref 3.8–10.5)

## 2020-11-22 PROCEDURE — 99232 SBSQ HOSP IP/OBS MODERATE 35: CPT | Mod: GC

## 2020-11-22 PROCEDURE — 99232 SBSQ HOSP IP/OBS MODERATE 35: CPT

## 2020-11-22 PROCEDURE — 99232 SBSQ HOSP IP/OBS MODERATE 35: CPT | Mod: GC,24

## 2020-11-22 RX ORDER — FUROSEMIDE 40 MG
20 TABLET ORAL DAILY
Refills: 0 | Status: DISCONTINUED | OUTPATIENT
Start: 2020-11-23 | End: 2020-11-24

## 2020-11-22 RX ORDER — FUROSEMIDE 40 MG
20 TABLET ORAL DAILY
Refills: 0 | Status: DISCONTINUED | OUTPATIENT
Start: 2020-11-22 | End: 2020-11-22

## 2020-11-22 RX ADMIN — Medication 10 UNIT(S): at 12:57

## 2020-11-22 RX ADMIN — MEROPENEM 100 MILLIGRAM(S): 1 INJECTION INTRAVENOUS at 08:47

## 2020-11-22 RX ADMIN — Medication 10 UNIT(S): at 08:48

## 2020-11-22 RX ADMIN — INSULIN GLARGINE 15 UNIT(S): 100 INJECTION, SOLUTION SUBCUTANEOUS at 21:41

## 2020-11-22 RX ADMIN — MEROPENEM 100 MILLIGRAM(S): 1 INJECTION INTRAVENOUS at 23:27

## 2020-11-22 RX ADMIN — Medication 5 MILLIGRAM(S): at 05:31

## 2020-11-22 RX ADMIN — EVEROLIMUS 2 MILLIGRAM(S): 10 TABLET ORAL at 20:03

## 2020-11-22 RX ADMIN — TAMSULOSIN HYDROCHLORIDE 0.4 MILLIGRAM(S): 0.4 CAPSULE ORAL at 21:41

## 2020-11-22 RX ADMIN — Medication 2: at 18:11

## 2020-11-22 RX ADMIN — Medication 1 APPLICATION(S): at 13:02

## 2020-11-22 RX ADMIN — Medication 1 TABLET(S): at 12:58

## 2020-11-22 RX ADMIN — VALGANCICLOVIR 450 MILLIGRAM(S): 450 TABLET, FILM COATED ORAL at 12:58

## 2020-11-22 RX ADMIN — MEROPENEM 100 MILLIGRAM(S): 1 INJECTION INTRAVENOUS at 01:13

## 2020-11-22 RX ADMIN — Medication 1: at 12:57

## 2020-11-22 RX ADMIN — PANTOPRAZOLE SODIUM 40 MILLIGRAM(S): 20 TABLET, DELAYED RELEASE ORAL at 05:31

## 2020-11-22 RX ADMIN — MEROPENEM 100 MILLIGRAM(S): 1 INJECTION INTRAVENOUS at 18:10

## 2020-11-22 RX ADMIN — Medication 81 MILLIGRAM(S): at 12:58

## 2020-11-22 RX ADMIN — Medication 40 MILLIGRAM(S): at 05:31

## 2020-11-22 RX ADMIN — Medication 10 UNIT(S): at 18:11

## 2020-11-22 RX ADMIN — Medication 2: at 08:47

## 2020-11-22 RX ADMIN — MAGNESIUM OXIDE 400 MG ORAL TABLET 800 MILLIGRAM(S): 241.3 TABLET ORAL at 08:48

## 2020-11-22 RX ADMIN — EVEROLIMUS 2 MILLIGRAM(S): 10 TABLET ORAL at 08:47

## 2020-11-22 RX ADMIN — Medication 1 APPLICATION(S): at 13:11

## 2020-11-22 NOTE — PROGRESS NOTE ADULT - ATTENDING COMMENTS
Abner Desouza  Attending Physician   Division of Infectious Disease  Pager #[phone]  After 5pm/weekend or no response, call #[phone]

## 2020-11-22 NOTE — PROGRESS NOTE ADULT - SUBJECTIVE AND OBJECTIVE BOX
Transplant Surgery - Multidisciplinary Rounds  --------------------------------------------------------------  OLT 7/2/2020    Present:   Patient seen and examined with multidisciplinary team including Transplant Surgeon: Dr. Gray, Transplant Hepatologist: Dr. Mo,  Transplant Nephrologist: Dr. Robin Patel, Pharmacist: Ancelmo Blanca, [facility], and unit RN during am rounds.  Disciplines not in attendance will be notified of the plan.     HPI: 64M with IDDM, HLD, obesity, HFpEF with mild LV diastolic dysfunction, MGUS, chronic anemia with a history of duodenal ulcer 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  Known history of non healing R heel ulcer. Followed by podiatry outpatient Dr. Patel, had recent debridement wound (10/20) cx grew ec faecalis, pseudomonas, enterococcus. Tx with cipro.  No improvement post debridement and PO antibiotic treatment.    Admitted with non healing R heel ulcer. MRI c/w early OM. Podiatry and ID consulted. Wound cultured. Broadened to meropenem.   MMF held. Everolimus dose reduced.     Interval Events:   - afebrile, stable VSS  - WBC trending down 2.6 (from 3.41), MMF on hold, Valcyte dose reduced   - SCr 1.4 (baseline ~1.3), Reduced Lasix 20mg daily   - Podiatry following: wound dressing changes with dakins solution; plan for OR/debridement this week     Potential Discharge date: pending clinical improvement     Education:  Medications    Plan of care:  See Below    MEDICATIONS  (STANDING):  aspirin enteric coated 81 milliGRAM(s) Oral daily  collagenase Ointment 1 Application(s) Topical  daily  Dakins Solution - 1/2 Strength 1 Application(s) Topical daily  dextrose 40% Gel 15 Gram(s) Oral once  dextrose 5%. 1000 milliLiter(s) (50 mL/Hr) IV Continuous <Continuous>  dextrose 5%. 1000 milliLiter(s) (100 mL/Hr) IV Continuous <Continuous>  dextrose 50% Injectable 25 Gram(s) IV Push once  dextrose 50% Injectable 12.5 Gram(s) IV Push once  dextrose 50% Injectable 25 Gram(s) IV Push once  everolimus (ZORTRESS) 2 milliGRAM(s) Oral <User Schedule>  furosemide    Tablet 20 milliGRAM(s) Oral daily  glucagon  Injectable 1 milliGRAM(s) IntraMuscular once  insulin glargine Injectable (LANTUS) 15 Unit(s) SubCutaneous at bedtime  insulin lispro (ADMELOG) corrective regimen sliding scale   SubCutaneous three times a day before meals  insulin lispro (ADMELOG) corrective regimen sliding scale   SubCutaneous at bedtime  insulin lispro Injectable (ADMELOG) 10 Unit(s) SubCutaneous three times a day before meals  magnesium oxide 800 milliGRAM(s) Oral with breakfast  meropenem  IVPB 1000 milliGRAM(s) IV Intermittent every 8 hours  pantoprazole    Tablet 40 milliGRAM(s) Oral before breakfast  predniSONE   Tablet 5 milliGRAM(s) Oral daily  tamsulosin 0.4 milliGRAM(s) Oral at bedtime  trimethoprim   80 mG/sulfamethoxazole 400 mG 1 Tablet(s) Oral daily  valGANciclovir 450 milliGRAM(s) Oral daily    MEDICATIONS  (PRN):  acetaminophen   Tablet .. 650 milliGRAM(s) Oral every 6 hours PRN Temp greater or equal to 38C (100.4F), Mild Pain (1 - 3)      PAST MEDICAL & SURGICAL HISTORY:  GIB (gastrointestinal bleeding)  GERD with esophagitis  Gastritis &amp; Non Bleeding Ulcers  Hepatic encephalopathy  Obesity  Fatty liver disease, nonalcoholic  Renal stones 25 years ago  Hypertension  Neuropathy  Hypercholesteremia  Diabetes  S/P cholecystectomy    Vital Signs Last 24 Hrs  T(C): 36.6 (22 Nov 2020 08:45), Max: 37.1 (22 Nov 2020 01:00)  T(F): 97.8 (22 Nov 2020 08:45), Max: 98.8 (22 Nov 2020 01:00)  HR: 81 (22 Nov 2020 08:45) (81 - 92)  BP: 129/73 (22 Nov 2020 08:45) (121/62 - 139/76)  BP(mean): --  RR: 18 (22 Nov 2020 08:45) (18 - 18)  SpO2: 97% (22 Nov 2020 08:45) (96% - 99%)    I&O's Summary    21 Nov 2020 07:01  -  22 Nov 2020 07:00  --------------------------------------------------------  IN: 940 mL / OUT: 3125 mL / NET: -2185 mL                           8.2    2.60  )-----------( 139      ( 22 Nov 2020 06:27 )             26.1     11-22    139  |  107  |  27<H>  ----------------------------<  280<H>  4.7   |  22  |  1.46<H>    Ca    8.6      22 Nov 2020 06:27  Phos  3.3     11-22  Mg     1.9     11-22    TPro  5.5<L>  /  Alb  3.1<L>  /  TBili  0.1<L>  /  DBili  x   /  AST  14  /  ALT  14  /  AlkPhos  89  11-22    Review of systems  Gen: No weight changes, fatigue, fevers/chills, weakness  Skin: R heel wound  Head/Eyes/Ears/Mouth: No headache; Normal hearing; Normal vision w/o blurriness; No sinus pain/discomfort, sore throat  Respiratory: No dyspnea, cough, wheezing, hemoptysis  CV: No chest pain, PND, orthopnea  GI: no abdominal pain  : No increased frequency, dysuria, hematuria, nocturia  MSK: No joint pain/swelling; no back pain; + LE edema  Neuro: No dizziness/lightheadedness, weakness, seizures, numbness, tingling  Heme: No easy bruising or bleeding  Endo: No heat/cold intolerance  Psych: No significant nervousness, anxiety, stress, depression  All other systems were reviewed and are negative, except as noted.    Physical Exam:   GENERAL:  No acute distress  HEENT:  Normocephalic/atraumatic, no scleral icterus  CHEST:  Clear to auscultation bilaterally, no wheezes/rales/ronchi, no accessory muscle use  HEART:  Regular rate and rhythm, no murmurs/rubs/gallops  ABDOMEN:  Soft, non-tender, non-distended,  well healed incision  EXTREMITIES: RLE with large 4x4 cm ulcer, with overlying granulation tissue and surrounding erythema, some visible bone. +1 BLE edema.  SKIN:  No rash/erythema/ecchymoses/petechiae/wounds/abscess/warm/dry  NEURO:  Alert and oriented x 3, no asterixis

## 2020-11-22 NOTE — PROGRESS NOTE ADULT - SUBJECTIVE AND OBJECTIVE BOX
JESSICA MARTIN 64y [id number]    Patient is a 64y old  Male who presents with a chief complaint of R heel wound (21 Nov 2020 13:25)      Follow Up/CC:  ID following for foot OM    Interval History/ROS: no fever, some pain in foot    Allergies    codeine (Anaphylaxis)    Intolerances        ANTIMICROBIALS:  meropenem  IVPB 1000 every 8 hours  trimethoprim   80 mG/sulfamethoxazole 400 mG 1 daily  valGANciclovir 450 daily      MEDICATIONS  (STANDING):  aspirin enteric coated 81 milliGRAM(s) Oral daily  collagenase Ointment 1 Application(s) Topical daily  Dakins Solution - 1/2 Strength 1 Application(s) Topical daily  dextrose 40% Gel 15 Gram(s) Oral once  dextrose 5%. 1000 milliLiter(s) (50 mL/Hr) IV Continuous <Continuous>  dextrose 5%. 1000 milliLiter(s) (100 mL/Hr) IV Continuous <Continuous>  dextrose 50% Injectable 25 Gram(s) IV Push once  dextrose 50% Injectable 12.5 Gram(s) IV Push once  dextrose 50% Injectable 25 Gram(s) IV Push once  everolimus (ZORTRESS) 2 milliGRAM(s) Oral <User Schedule>  furosemide    Tablet 40 milliGRAM(s) Oral daily  glucagon  Injectable 1 milliGRAM(s) IntraMuscular once  insulin glargine Injectable (LANTUS) 15 Unit(s) SubCutaneous at bedtime  insulin lispro (ADMELOG) corrective regimen sliding scale   SubCutaneous three times a day before meals  insulin lispro (ADMELOG) corrective regimen sliding scale   SubCutaneous at bedtime  insulin lispro Injectable (ADMELOG) 10 Unit(s) SubCutaneous three times a day before meals  magnesium oxide 800 milliGRAM(s) Oral with breakfast  meropenem  IVPB 1000 milliGRAM(s) IV Intermittent every 8 hours  pantoprazole    Tablet 40 milliGRAM(s) Oral before breakfast  predniSONE   Tablet 5 milliGRAM(s) Oral daily  tamsulosin 0.4 milliGRAM(s) Oral at bedtime  trimethoprim   80 mG/sulfamethoxazole 400 mG 1 Tablet(s) Oral daily  valGANciclovir 450 milliGRAM(s) Oral daily    MEDICATIONS  (PRN):  acetaminophen   Tablet .. 650 milliGRAM(s) Oral every 6 hours PRN Temp greater or equal to 38C (100.4F), Mild Pain (1 - 3)        Vital Signs Last 24 Hrs  T(C): 36.9 (22 Nov 2020 04:46), Max: 37.2 (21 Nov 2020 09:00)  T(F): 98.5 (22 Nov 2020 04:46), Max: 98.9 (21 Nov 2020 09:00)  HR: 89 (22 Nov 2020 04:46) (83 - 92)  BP: 137/69 (22 Nov 2020 04:46) (121/62 - 139/76)  BP(mean): --  RR: 18 (22 Nov 2020 04:46) (18 - 18)  SpO2: 97% (22 Nov 2020 04:46) (96% - 99%)    CBC Full  -  ( 22 Nov 2020 06:27 )  WBC Count : 2.60 K/uL  RBC Count : 2.86 M/uL  Hemoglobin : 8.2 g/dL  Hematocrit : 26.1 %  Platelet Count - Automated : 139 K/uL  Mean Cell Volume : 91.3 fl  Mean Cell Hemoglobin : 28.7 pg  Mean Cell Hemoglobin Concentration : 31.4 gm/dL  Auto Neutrophil # : x  Auto Lymphocyte # : x  Auto Monocyte # : x  Auto Eosinophil # : x  Auto Basophil # : x  Auto Neutrophil % : x  Auto Lymphocyte % : x  Auto Monocyte % : x  Auto Eosinophil % : x  Auto Basophil % : x    11-22    139  |  107  |  27<H>  ----------------------------<  280<H>  4.7   |  22  |  1.46<H>    Ca    8.6      22 Nov 2020 06:27  Phos  3.3     11-22  Mg     1.9     11-22    TPro  5.5<L>  /  Alb  3.1<L>  /  TBili  0.1<L>  /  DBili  x   /  AST  14  /  ALT  14  /  AlkPhos  89  11-22    LIVER FUNCTIONS - ( 22 Nov 2020 06:27 )  Alb: 3.1 g/dL / Pro: 5.5 g/dL / ALK PHOS: 89 U/L / ALT: 14 U/L / AST: 14 U/L / GGT: x               MICROBIOLOGY:  .Blood Blood-Peripheral  11-20-20   No growth to date.  --  --          CMV IgG Antibody: <0.20 U/mL (12-04-19 @ 08:33)  Toxoplasma IgG Screen: <3.0 IU/mL (12-04-19 @ 08:33)    RADIOLOGY    < from: MR Foot No Cont, Right (11.21.20 @ 12:58) >  Possible early osteomyelitis at the plantar and lateral aspect of the calcaneus with adjacent soft tissue ulceration.    Repeat study is recommended when the patient is able to complete entire study.    < end of copied text >

## 2020-11-22 NOTE — PROGRESS NOTE ADULT - ASSESSMENT
65yo M with PMH of DM, HLD, GERD, HFpEF with mild LV diastolic dysfunction, and decompensated JEAN cirrhosis, duodenal ulcer, GAVE and duodenal AVM s/p APC (last on 10/11/19), SHENG, s/p liver transplant (7/2/20). Post-op course c/b tacrolimus toxicity and hospital delirium improving after stopping CNI and replacing w/ everolimus.   Patient now presents with worsening LE swelling and right foot wound concerning for OM.    # Suspected OM - with non healing RLE ulcer with probe to bone. No systemic signs of infection. Given patient's history with ESBL UTI and VRE bacteremia would neet broad abx coverage. Planned for possible partial calcanectomy with podiatry.   #S/p liver transplant 7/2:   Recipient Info:   ABO: A  CMV:  Neg  EBV Positive  Donor:  Donor ID:  [id number] Match: [id number]  Age: 29 ABO:  A1 High Risk:   No COD: Cardiovascular  Anti CMV positive, EBV IgG- positive  HepBcAb-neg, Hepatitis C-DANA- neg, Hepatitis C ab-neg    # SHENG   #Sacral decubitus ulcer: stage I, does not appear infected  # DM2      Recommendations:  - c/w Meropenem (dosing per transplant ID)  - f/u with podiatry re: management of OM  - c/w bactrim SS PO qd, Valcyte 450mg qd for ppx  - MMF 2 in the AM and 1.5 in the PM  - c/w prednisone 5mg qd  - Hold cellcept  - Decrease lasix to 20mg PO qd  - CMP, CBC, INR qd     Hepatology will follow with you.    Thank you for involving us in the care of this patient. Please reach out if any further questions.    Adonis Rubio, PGY-4  GI Fellow    Available on [employer] Teams  Pager [phone] ([facility]) or [phone] ([facility])  After 5PM/Weekends, please contact the on-call GI fellow: [phone]  Available through [employer] Teams   63yo M with PMH of DM, HLD, GERD, HFpEF with mild LV diastolic dysfunction, and decompensated JEAN cirrhosis, duodenal ulcer, GAVE and duodenal AVM s/p APC (last on 10/11/19), SHENG, s/p liver transplant (7/2/20). Post-op course c/b tacrolimus toxicity and hospital delirium improving after stopping CNI and replacing w/ everolimus.   Patient now presents with worsening LE swelling and right foot wound concerning for OM.    # OM - with non healing RLE ulcer with probe to bone. No systemic signs of infection. Given patient's history with ESBL UTI and VRE bacteremia would neet broad abx coverage. Planned for possible partial calcanectomy with podiatry.   #S/p liver transplant 7/2:   Recipient Info:   ABO: A  CMV:  Neg  EBV Positive  Donor:  Donor ID:  [id number] Match: [id number]  Age: 29 ABO:  A1 High Risk:   No COD: Cardiovascular  Anti CMV positive, EBV IgG- positive  HepBcAb-neg, Hepatitis C-DANA- neg, Hepatitis C ab-neg    # SHENG   #Sacral decubitus ulcer: stage I, does not appear infected  # DM2      Recommendations:  - c/w Meropenem (dosing per transplant ID)  - f/u with podiatry re: management of OM  - c/w bactrim SS PO qd, Valcyte 450mg qd for ppx  - Hold MMF  - Everolimus 2 mg po q12h, follow-up trough level  - c/w prednisone 5mg qd  - Decrease lasix to 20mg PO qd  - CMP, CBC, INR qd     Hepatology will follow with you.    Thank you for involving us in the care of this patient. Please reach out if any further questions.    Adonis Rubio, PGY-4  GI Fellow    Available on [employer] Teams  Pager [phone] ([facility]) or [phone] ([facility])  After 5PM/Weekends, please contact the on-call GI fellow: [phone]  Available through [employer] Teams

## 2020-11-22 NOTE — PROGRESS NOTE ADULT - SUBJECTIVE AND OBJECTIVE BOX
Chief Complaint:  Patient is a 64y old  Male who presents with a chief complaint of R heel wound (22 Nov 2020 11:01)      Interval Events:   - Underwent MRI of the right heel  - No acute events    Allergies:  codeine (Anaphylaxis)    Hospital Medications:  acetaminophen   Tablet .. 650 milliGRAM(s) Oral every 6 hours PRN  aspirin enteric coated 81 milliGRAM(s) Oral daily  collagenase Ointment 1 Application(s) Topical daily  Dakins Solution - 1/2 Strength 1 Application(s) Topical daily  dextrose 40% Gel 15 Gram(s) Oral once  dextrose 5%. 1000 milliLiter(s) IV Continuous <Continuous>  dextrose 5%. 1000 milliLiter(s) IV Continuous <Continuous>  dextrose 50% Injectable 25 Gram(s) IV Push once  dextrose 50% Injectable 12.5 Gram(s) IV Push once  dextrose 50% Injectable 25 Gram(s) IV Push once  everolimus (ZORTRESS) 2 milliGRAM(s) Oral <User Schedule>  glucagon  Injectable 1 milliGRAM(s) IntraMuscular once  insulin glargine Injectable (LANTUS) 15 Unit(s) SubCutaneous at bedtime  insulin lispro (ADMELOG) corrective regimen sliding scale   SubCutaneous three times a day before meals  insulin lispro (ADMELOG) corrective regimen sliding scale   SubCutaneous at bedtime  insulin lispro Injectable (ADMELOG) 10 Unit(s) SubCutaneous three times a day before meals  magnesium oxide 800 milliGRAM(s) Oral with breakfast  meropenem  IVPB 1000 milliGRAM(s) IV Intermittent every 8 hours  pantoprazole    Tablet 40 milliGRAM(s) Oral before breakfast  predniSONE   Tablet 5 milliGRAM(s) Oral daily  tamsulosin 0.4 milliGRAM(s) Oral at bedtime  trimethoprim   80 mG/sulfamethoxazole 400 mG 1 Tablet(s) Oral daily  valGANciclovir 450 milliGRAM(s) Oral daily      PMHX/PSHX:  GIB (gastrointestinal bleeding)    GERD with esophagitis    Hepatic encephalopathy    Obesity    Fatty liver disease, nonalcoholic    Renal stones    Hypertension    Neuropathy    Hypercholesteremia    Diabetes    S/P cholecystectomy    No significant past surgical history        Family history:  Family history of type 2 diabetes mellitus    Family history of hypertension    Family history of stomach cancer    No pertinent family history in first degree relatives        ROS:   General:  No wt loss, fevers, chills, night sweats, fatigue  Eyes:  Good vision, no reported pain  ENT:  No sore throat, pain, runny nose, dysphagia  CV:  No pain, palpitations, hypo/hypertension  Pulm:  No dyspnea, cough, tachypnea, wheezing  GI:  No pain, No nausea, No vomiting, No diarrhea, No constipation, No weight loss, No fever, No pruritis, No rectal bleeding, No tarry stools, No dysphagia,  :  No pain, bleeding, incontinence, nocturia  Muscle:  No pain, weakness  Neuro:  No weakness, tingling, memory problems  Psych:  No fatigue, insomnia, mood problems, depression  Endocrine:  No polyuria, polydipsia, cold/heat intolerance  Heme:  No petechiae, ecchymosis, easy bruisability  Skin:  No rash, tattoos, scars, edema      PHYSICAL EXAM:   Vital Signs:  Vital Signs Last 24 Hrs  T(C): 36.4 (22 Nov 2020 13:21), Max: 37.1 (22 Nov 2020 01:00)  T(F): 97.5 (22 Nov 2020 13:21), Max: 98.8 (22 Nov 2020 01:00)  HR: 81 (22 Nov 2020 13:21) (81 - 92)  BP: 152/79 (22 Nov 2020 13:21) (129/73 - 152/79)  BP(mean): --  RR: 18 (22 Nov 2020 13:21) (18 - 18)  SpO2: 100% (22 Nov 2020 13:21) (97% - 100%)  Daily     Daily     GENERAL:  No acute distress  HEENT:  Normocephalic/atraumatic, no scleral icterus  CHEST:  Clear to auscultation bilaterally, no wheezes/rales/ronchi, no accessory muscle use  HEART:  Regular rate and rhythm, no murmurs/rubs/gallops  ABDOMEN:  Soft, non-tender, non-distended, normoactive bowel sounds,  no masses, no hepato-splenomegaly, no signs of chronic liver disease  EXTREMITIES: RLE with large 4x4 cm ulcer, with overlying granulation tissue and surrounding erythema. +1 BLE edema.  SKIN:  No rash/erythema/ecchymoses/petechiae/wounds/abscess/warm/dry  NEURO:  Alert and oriented x 3, no asterixis    LABS:                        8.2    2.60  )-----------( 139      ( 22 Nov 2020 06:27 )             26.1     Mean Cell Volume: 91.3 fl (11-22-20 @ 06:27)    11-22    139  |  107  |  27<H>  ----------------------------<  280<H>  4.7   |  22  |  1.46<H>    Ca    8.6      22 Nov 2020 06:27  Phos  3.3     11-22  Mg     1.9     11-22    TPro  5.5<L>  /  Alb  3.1<L>  /  TBili  0.1<L>  /  DBili  x   /  AST  14  /  ALT  14  /  AlkPhos  89  11-22    LIVER FUNCTIONS - ( 22 Nov 2020 06:27 )  Alb: 3.1 g/dL / Pro: 5.5 g/dL / ALK PHOS: 89 U/L / ALT: 14 U/L / AST: 14 U/L / GGT: x           PT/INR - ( 22 Nov 2020 06:27 )   PT: 12.4 sec;   INR: 1.04 ratio         PTT - ( 22 Nov 2020 06:27 )  PTT:30.1 sec                            8.2    2.60  )-----------( 139      ( 22 Nov 2020 06:27 )             26.1                         7.8    3.41  )-----------( 141      ( 21 Nov 2020 09:44 )             24.5                         8.3    2.67  )-----------( 131      ( 21 Nov 2020 05:42 )             25.5                         9.2    4.34  )-----------( 163      ( 20 Nov 2020 19:18 )             29.0       Imaging:  EXAM:  MR FOOT RT                            PROCEDURE DATE:  11/21/2020            INTERPRETATION:  RIGHT FOOT MRI    CLINICAL INFORMATION: Right foot pain and swelling. Evaluate for prostate myelitis.  TECHNIQUE: Multiplanar, multisequence MRI was obtained of the right foot.    FINDINGS:    The study is markedly limited as there are only 3 sequences which are motion degraded.    There appears to be soft tissue ulceration of the plantar aspect of the calcaneus with bone marrow edema within the subcortical bone of the lateral aspect of the posterior calcaneus without definitive low T1 signal. This may represent early osteomyelitis. There is marked bone marrow edema at the plantar aspect of the talus laterally with associated low T1 signal. This appears to be degenerative in nature but is difficult to evaluate given the lack of multiplanar imaging.    Mild midfoot arthrosis is present at the TMT joints.    There is marked dorsal subcutaneous soft tissue edema throughout the midfoot and forefoot. Circumferential subcutaneous soft tissue edema seen at the ankle.    IMPRESSION:Markedly limited study.    Possible early osteomyelitis at the plantar and lateral aspect of the calcaneus with adjacent soft tissue ulceration.    Repeat study is recommended when the patient is able to complete entire study.

## 2020-11-22 NOTE — PROGRESS NOTE ADULT - ASSESSMENT
64M with IDDM, HLD, obesity, HFpEF with mild LV diastolic dysfunction, MGUS, chronic anemia with a history of duodenal ulcer 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  Known history of non healing R heel ulcer. Followed by podiatry outpatient Dr. Patel, had recent debridement wound (10/20) cx grew ec faecalis, pseudomonas, enterococcus. Tx with cipro.  No improvement post debridement and PO antibiotic treatment. Admitted for further management.     [] R heel ulcer  -  MRI c/w early OM.   - Podiatry and ID consulted.   - Wound cultured. Broadened to meropenem.   - MMF held. Everolimus dose reduced.   - Plan for OR debridement this week   - PT c/s    [] s/p OLT with good graft function   - Immuno: Everolimus 2/2, MMF on hold in setting of infection, Pred 5  - PPx: bactrim/valcyte  - Watch WBC (trending down)  - Decr Lasix 20mg daily (rising creat)  - PT c/s    [] DM  - glargine 15u qhs  - lispro 10u with meals

## 2020-11-22 NOTE — PROGRESS NOTE ADULT - ATTENDING COMMENTS
MRI with confirmed OM, will follow-up with Podiatry re: planned surgical intervention. Continue antibiotics as per Transplant ID.  Peripheral edema improved today and labs with mild increase in Cr. Will decrease Lasix back to 20 mg/day.  Continue everolimus at 2 mg po q12h for now per discussion with Transplant Surgery, for goal trough level ~3.  He is not a candidate for conversion back to CNI as he had severe neurotoxicity with both tacrolimus and cyclosporine even at minimal doses.  Holding MMF for now given active infection. Continue prednisone 5 mg po daily.

## 2020-11-22 NOTE — PROGRESS NOTE ADULT - ASSESSMENT
64M JEAN Cirrhosis s/p OLT (7/2/20 at [facility]), IDDM, MGUS who presented to [facility] on 11/20 with worsening drainage at his right heel with concern for osteomyelitis.    Had a debridement in 10/2020 with cultures growing fairly broadly susceptible Enterococcus faecalis, Pseudomonas and Enterobacter.   Patient was treated with Ciprofloxacin Patient has continued to have right heel drainage and wound at the heel.     COVID19 PCR (11/20) negative.   XR Foot (11/20) with no fracture dislocation or evidence of OM.   ESR of 65.   CRP of 3.73.     Per podiatry evaluation wound probes to bone  High concern for OM  Surgical swab culture sent by podiatry  Can continue Meropenem at this point (would cover all isolates from 10/2020 debridement)  MRI Performed today but patient could not tolerate complete test due to pain; would followup on result - may need repeat imaging depending on quality of images    RECOMMENDATIONS:    #R Heel Wound, Suspected Heel Osteomyelitis  --Increase Meropenem to 1g IV Q8H  --Recommend MRSA/MSSA Nasal PCR (ordered)  --Followup on MRI of R Foot - possible calcaneal OM  --Followup on blood cultures - so far negative  --f/u right heel wound cultures  --local wound care and surgical plans intervention per podiatry  --abx course and choice to be determined by pending wound cx and OR findings     #Liver Transplant Recipient, Prophylactic Antibiotic (CMV +/-)  --Continue Valcyte 450 mg PO Q24H (CrCl currently 50-59)  --Continue Bactrim SS PO QD    #medication monitoring  -monitor creatinine - if increasing, will need to lower meropenem

## 2020-11-23 ENCOUNTER — TRANSCRIPTION ENCOUNTER (OUTPATIENT)
Age: 65
End: 2020-11-23

## 2020-11-23 LAB
-  AMIKACIN: SIGNIFICANT CHANGE UP
-  AZTREONAM: SIGNIFICANT CHANGE UP
-  CEFEPIME: SIGNIFICANT CHANGE UP
-  CEFTAZIDIME: SIGNIFICANT CHANGE UP
-  CIPROFLOXACIN: SIGNIFICANT CHANGE UP
-  GENTAMICIN: SIGNIFICANT CHANGE UP
-  IMIPENEM: SIGNIFICANT CHANGE UP
-  LEVOFLOXACIN: SIGNIFICANT CHANGE UP
-  MEROPENEM: SIGNIFICANT CHANGE UP
-  PIPERACILLIN/TAZOBACTAM: SIGNIFICANT CHANGE UP
-  TOBRAMYCIN: SIGNIFICANT CHANGE UP
ALBUMIN SERPL ELPH-MCNC: 3.2 G/DL — LOW (ref 3.3–5)
ALP SERPL-CCNC: 90 U/L — SIGNIFICANT CHANGE UP (ref 40–120)
ALT FLD-CCNC: 15 U/L — SIGNIFICANT CHANGE UP (ref 10–45)
ANION GAP SERPL CALC-SCNC: 9 MMOL/L — SIGNIFICANT CHANGE UP (ref 5–17)
APTT BLD: 29.3 SEC — SIGNIFICANT CHANGE UP (ref 27.5–35.5)
AST SERPL-CCNC: 15 U/L — SIGNIFICANT CHANGE UP (ref 10–40)
BASOPHILS # BLD AUTO: 0.02 K/UL — SIGNIFICANT CHANGE UP (ref 0–0.2)
BASOPHILS NFR BLD AUTO: 0.8 % — SIGNIFICANT CHANGE UP (ref 0–2)
BILIRUB SERPL-MCNC: 0.1 MG/DL — LOW (ref 0.2–1.2)
BUN SERPL-MCNC: 25 MG/DL — HIGH (ref 7–23)
CALCIUM SERPL-MCNC: 8.9 MG/DL — SIGNIFICANT CHANGE UP (ref 8.4–10.5)
CHLORIDE SERPL-SCNC: 106 MMOL/L — SIGNIFICANT CHANGE UP (ref 96–108)
CO2 SERPL-SCNC: 25 MMOL/L — SIGNIFICANT CHANGE UP (ref 22–31)
CREAT SERPL-MCNC: 1.44 MG/DL — HIGH (ref 0.5–1.3)
CULTURE RESULTS: SIGNIFICANT CHANGE UP
EOSINOPHIL # BLD AUTO: 0.1 K/UL — SIGNIFICANT CHANGE UP (ref 0–0.5)
EOSINOPHIL NFR BLD AUTO: 3.8 % — SIGNIFICANT CHANGE UP (ref 0–6)
EVEROLIMUS, WHOLE BLOOD RESULT: 4.5 NG/ML — SIGNIFICANT CHANGE UP (ref 3–?)
GLUCOSE SERPL-MCNC: 145 MG/DL — HIGH (ref 70–99)
HCT VFR BLD CALC: 24.8 % — LOW (ref 39–50)
HGB BLD-MCNC: 7.8 G/DL — LOW (ref 13–17)
IMM GRANULOCYTES NFR BLD AUTO: 0.8 % — SIGNIFICANT CHANGE UP (ref 0–1.5)
INR BLD: 1.03 RATIO — SIGNIFICANT CHANGE UP (ref 0.88–1.16)
LYMPHOCYTES # BLD AUTO: 0.81 K/UL — LOW (ref 1–3.3)
LYMPHOCYTES # BLD AUTO: 30.6 % — SIGNIFICANT CHANGE UP (ref 13–44)
MAGNESIUM SERPL-MCNC: 2 MG/DL — SIGNIFICANT CHANGE UP (ref 1.6–2.6)
MCHC RBC-ENTMCNC: 28.8 PG — SIGNIFICANT CHANGE UP (ref 27–34)
MCHC RBC-ENTMCNC: 31.5 GM/DL — LOW (ref 32–36)
MCV RBC AUTO: 91.5 FL — SIGNIFICANT CHANGE UP (ref 80–100)
METHOD TYPE: SIGNIFICANT CHANGE UP
MONOCYTES # BLD AUTO: 0.28 K/UL — SIGNIFICANT CHANGE UP (ref 0–0.9)
MONOCYTES NFR BLD AUTO: 10.6 % — SIGNIFICANT CHANGE UP (ref 2–14)
NEUTROPHILS # BLD AUTO: 1.42 K/UL — LOW (ref 1.8–7.4)
NEUTROPHILS NFR BLD AUTO: 53.4 % — SIGNIFICANT CHANGE UP (ref 43–77)
NRBC # BLD: 0 /100 WBCS — SIGNIFICANT CHANGE UP (ref 0–0)
ORGANISM # SPEC MICROSCOPIC CNT: SIGNIFICANT CHANGE UP
ORGANISM # SPEC MICROSCOPIC CNT: SIGNIFICANT CHANGE UP
PHOSPHATE SERPL-MCNC: 3.3 MG/DL — SIGNIFICANT CHANGE UP (ref 2.5–4.5)
PLATELET # BLD AUTO: 157 K/UL — SIGNIFICANT CHANGE UP (ref 150–400)
POTASSIUM SERPL-MCNC: 4.4 MMOL/L — SIGNIFICANT CHANGE UP (ref 3.5–5.3)
POTASSIUM SERPL-SCNC: 4.4 MMOL/L — SIGNIFICANT CHANGE UP (ref 3.5–5.3)
PROT SERPL-MCNC: 5.8 G/DL — LOW (ref 6–8.3)
PROTHROM AB SERPL-ACNC: 12.3 SEC — SIGNIFICANT CHANGE UP (ref 10.6–13.6)
RBC # BLD: 2.71 M/UL — LOW (ref 4.2–5.8)
RBC # FLD: 13 % — SIGNIFICANT CHANGE UP (ref 10.3–14.5)
SODIUM SERPL-SCNC: 140 MMOL/L — SIGNIFICANT CHANGE UP (ref 135–145)
SPECIMEN SOURCE: SIGNIFICANT CHANGE UP
WBC # BLD: 2.65 K/UL — LOW (ref 3.8–10.5)
WBC # FLD AUTO: 2.65 K/UL — LOW (ref 3.8–10.5)

## 2020-11-23 PROCEDURE — 99233 SBSQ HOSP IP/OBS HIGH 50: CPT

## 2020-11-23 PROCEDURE — 71046 X-RAY EXAM CHEST 2 VIEWS: CPT | Mod: 26

## 2020-11-23 PROCEDURE — 99232 SBSQ HOSP IP/OBS MODERATE 35: CPT | Mod: GC

## 2020-11-23 PROCEDURE — 99232 SBSQ HOSP IP/OBS MODERATE 35: CPT

## 2020-11-23 RX ORDER — INSULIN GLARGINE 100 [IU]/ML
8 INJECTION, SOLUTION SUBCUTANEOUS AT BEDTIME
Refills: 0 | Status: DISCONTINUED | OUTPATIENT
Start: 2020-11-23 | End: 2020-11-24

## 2020-11-23 RX ORDER — PIPERACILLIN AND TAZOBACTAM 4; .5 G/20ML; G/20ML
3.38 INJECTION, POWDER, LYOPHILIZED, FOR SOLUTION INTRAVENOUS EVERY 8 HOURS
Refills: 0 | Status: DISCONTINUED | OUTPATIENT
Start: 2020-11-23 | End: 2020-11-23

## 2020-11-23 RX ORDER — VANCOMYCIN HCL 1 G
1000 VIAL (EA) INTRAVENOUS ONCE
Refills: 0 | Status: COMPLETED | OUTPATIENT
Start: 2020-11-23 | End: 2020-11-23

## 2020-11-23 RX ORDER — CEFEPIME 1 G/1
2000 INJECTION, POWDER, FOR SOLUTION INTRAMUSCULAR; INTRAVENOUS EVERY 12 HOURS
Refills: 0 | Status: DISCONTINUED | OUTPATIENT
Start: 2020-11-23 | End: 2020-11-24

## 2020-11-23 RX ORDER — VANCOMYCIN HCL 1 G
1250 VIAL (EA) INTRAVENOUS DAILY
Refills: 0 | Status: DISCONTINUED | OUTPATIENT
Start: 2020-11-24 | End: 2020-11-24

## 2020-11-23 RX ORDER — VANCOMYCIN HCL 1 G
VIAL (EA) INTRAVENOUS
Refills: 0 | Status: DISCONTINUED | OUTPATIENT
Start: 2020-11-23 | End: 2020-11-23

## 2020-11-23 RX ORDER — VANCOMYCIN HCL 1 G
1000 VIAL (EA) INTRAVENOUS EVERY 12 HOURS
Refills: 0 | Status: DISCONTINUED | OUTPATIENT
Start: 2020-11-23 | End: 2020-11-23

## 2020-11-23 RX ORDER — PIPERACILLIN AND TAZOBACTAM 4; .5 G/20ML; G/20ML
3.38 INJECTION, POWDER, LYOPHILIZED, FOR SOLUTION INTRAVENOUS ONCE
Refills: 0 | Status: DISCONTINUED | OUTPATIENT
Start: 2020-11-23 | End: 2020-11-23

## 2020-11-23 RX ADMIN — CEFEPIME 100 MILLIGRAM(S): 1 INJECTION, POWDER, FOR SOLUTION INTRAMUSCULAR; INTRAVENOUS at 18:31

## 2020-11-23 RX ADMIN — Medication 650 MILLIGRAM(S): at 12:59

## 2020-11-23 RX ADMIN — Medication 81 MILLIGRAM(S): at 12:03

## 2020-11-23 RX ADMIN — Medication 1: at 12:01

## 2020-11-23 RX ADMIN — TAMSULOSIN HYDROCHLORIDE 0.4 MILLIGRAM(S): 0.4 CAPSULE ORAL at 21:18

## 2020-11-23 RX ADMIN — Medication 650 MILLIGRAM(S): at 13:30

## 2020-11-23 RX ADMIN — EVEROLIMUS 2 MILLIGRAM(S): 10 TABLET ORAL at 20:18

## 2020-11-23 RX ADMIN — Medication 1 APPLICATION(S): at 12:02

## 2020-11-23 RX ADMIN — INSULIN GLARGINE 8 UNIT(S): 100 INJECTION, SOLUTION SUBCUTANEOUS at 21:18

## 2020-11-23 RX ADMIN — PANTOPRAZOLE SODIUM 40 MILLIGRAM(S): 20 TABLET, DELAYED RELEASE ORAL at 05:00

## 2020-11-23 RX ADMIN — Medication 1: at 18:31

## 2020-11-23 RX ADMIN — EVEROLIMUS 2 MILLIGRAM(S): 10 TABLET ORAL at 08:18

## 2020-11-23 RX ADMIN — Medication 10 UNIT(S): at 12:02

## 2020-11-23 RX ADMIN — Medication 10 UNIT(S): at 18:32

## 2020-11-23 RX ADMIN — Medication 20 MILLIGRAM(S): at 05:00

## 2020-11-23 RX ADMIN — MEROPENEM 100 MILLIGRAM(S): 1 INJECTION INTRAVENOUS at 08:20

## 2020-11-23 RX ADMIN — Medication 5 MILLIGRAM(S): at 05:00

## 2020-11-23 RX ADMIN — Medication 250 MILLIGRAM(S): at 12:02

## 2020-11-23 RX ADMIN — Medication 10 UNIT(S): at 08:17

## 2020-11-23 RX ADMIN — Medication 1 TABLET(S): at 12:03

## 2020-11-23 RX ADMIN — VALGANCICLOVIR 450 MILLIGRAM(S): 450 TABLET, FILM COATED ORAL at 12:03

## 2020-11-23 NOTE — DIETITIAN INITIAL EVALUATION ADULT. - PERTINENT MEDS FT
ABX, Lantus, Admelog, Admelog Sliding Scale, Lasix, zostress, magnesium, Protonix, prednisone Lantus, Admelog, Admelog Sliding Scale, Lasix, magnesium, Protonix, prednisone

## 2020-11-23 NOTE — PHYSICAL THERAPY INITIAL EVALUATION ADULT - ADDITIONAL COMMENTS
(FK discontinued/everolimus initiated 7/27). Known history of non healing R heel ulcer. Followed by podiatry outpatient Dr. Patel, had recent debridement wound cx grew ec faecalis, pseudomonas, enterococcus. Tx with cipro. No improvement post debridement and PO antibiotic treatment. MR R foot 11/21 with Possible early osteomyelitis at the plantar and lateral aspect of the calcaneus with adjacent soft tissue ulceration. (FK discontinued/everolimus initiated 7/27). Known history of non healing R heel ulcer. Followed by podiatry outpatient Dr. Patel, had recent debridement wound cx grew ec faecalis, pseudomonas, enterococcus. Tx with cipro. No improvement post debridement and PO antibiotic treatment. MR R foot 11/21 with Possible early osteomyelitis at the plantar and lateral aspect of the calcaneus with adjacent soft tissue ulceration. Home situation/prior level of function- Pt lives with wife and children in private house, 3 small stairs to enter (+)rail. No indoor stairs to negotiate. Was independent ambulator with rolling walker. Has wheelchair and shower chair at home. (FK discontinued/everolimus initiated 7/27). Known history of non healing R heel ulcer. Followed by podiatry outpatient Dr. Patel, had recent debridement wound cx grew ec faecalis, pseudomonas, enterococcus. Tx with cipro. No improvement post debridement and PO antibiotic treatment. MR R foot 11/21 with Possible early osteomyelitis at the plantar and lateral aspect of the calcaneus with adjacent soft tissue ulceration. Home situation/prior level of function- Pt lives with wife and children in private house, 3 small stairs to enter (+)rail. No indoor stairs to negotiate. Was independent ambulator with no device indoors, rolling walker in the community. Has wheelchair and shower chair at home.

## 2020-11-23 NOTE — PROGRESS NOTE ADULT - SUBJECTIVE AND OBJECTIVE BOX
Chief Complaint:  Patient is a 64y old  Male who presents with a chief complaint of R heel wound (23 Nov 2020 11:39)      Interval Events:     No fevers, chills, sweats.   Right foot pain is unchanged.    Tagged-WBC scan ordered.   Tentative plan for right foot calcanectomy with graft by Podiatry 11/24    Allergies:  codeine (Anaphylaxis)      Hospital Medications:  acetaminophen   Tablet .. 650 milliGRAM(s) Oral every 6 hours PRN  aspirin enteric coated 81 milliGRAM(s) Oral daily  collagenase Ointment 1 Application(s) Topical daily  Dakins Solution - 1/2 Strength 1 Application(s) Topical daily  dextrose 40% Gel 15 Gram(s) Oral once  dextrose 5%. 1000 milliLiter(s) IV Continuous <Continuous>  dextrose 5%. 1000 milliLiter(s) IV Continuous <Continuous>  dextrose 50% Injectable 25 Gram(s) IV Push once  dextrose 50% Injectable 12.5 Gram(s) IV Push once  dextrose 50% Injectable 25 Gram(s) IV Push once  everolimus (ZORTRESS) 2 milliGRAM(s) Oral <User Schedule>  furosemide   Injectable 20 milliGRAM(s) IV Push daily  glucagon  Injectable 1 milliGRAM(s) IntraMuscular once  insulin glargine Injectable (LANTUS) 15 Unit(s) SubCutaneous at bedtime  insulin lispro (ADMELOG) corrective regimen sliding scale   SubCutaneous three times a day before meals  insulin lispro (ADMELOG) corrective regimen sliding scale   SubCutaneous at bedtime  insulin lispro Injectable (ADMELOG) 10 Unit(s) SubCutaneous three times a day before meals  magnesium oxide 800 milliGRAM(s) Oral with breakfast  meropenem  IVPB 1000 milliGRAM(s) IV Intermittent every 8 hours  pantoprazole    Tablet 40 milliGRAM(s) Oral before breakfast  predniSONE   Tablet 5 milliGRAM(s) Oral daily  tamsulosin 0.4 milliGRAM(s) Oral at bedtime  trimethoprim   80 mG/sulfamethoxazole 400 mG 1 Tablet(s) Oral daily  valGANciclovir 450 milliGRAM(s) Oral daily  vancomycin  IVPB      vancomycin  IVPB 1000 milliGRAM(s) IV Intermittent every 12 hours      PMHX/PSHX:  GIB (gastrointestinal bleeding)    GERD with esophagitis    Hepatic encephalopathy    Obesity    Fatty liver disease, nonalcoholic    Renal stones    Hypertension    Neuropathy    Hypercholesteremia    Diabetes    S/P cholecystectomy    No significant past surgical history        Family history:  Family history of type 2 diabetes mellitus    Family history of hypertension    Family history of stomach cancer    No pertinent family history in first degree relatives        ROS:     General:  No weight loss, fevers, chills, night sweats, fatigue   Eyes:  No vision changes  ENT:  No sore throat, pain, runny nose  CV:  No chest pain, palpitations, dizziness   Resp:  No SOB, cough, wheezing  GI:  See HPI  :  No burning with urination, hematuria  Muscle:  No pain, weakness  Neuro:  No weakness/tingling, memory problems  Psych:  No fatigue, insomnia, mood problems, depression  Heme:  No easy bruisability  Skin:  No rash, edema      PHYSICAL EXAM:     GENERAL:  Non-toxic, no distress, resting comfortably   HEENT:  NC/AT,  conjunctivae clear, sclera anicteric  CHEST:  No increased effort w/ respirations  HEART:  Regular rhythm & rate  ABDOMEN:  Soft, non-tender, non-distended  EXTREMITIES:  1+ pitting edema RLE +LLE covered with ACE-Wrap  SKIN:  No rash/erythema/ecchymoses/petechiae/jaundice   NEURO:  Alert, orientedx3    Vital Signs:  Vital Signs Last 24 Hrs  T(C): 36.5 (23 Nov 2020 09:00), Max: 37.1 (22 Nov 2020 21:34)  T(F): 97.7 (23 Nov 2020 09:00), Max: 98.7 (22 Nov 2020 21:34)  HR: 91 (23 Nov 2020 09:00) (81 - 91)  BP: 125/62 (23 Nov 2020 09:00) (106/66 - 143/75)  BP(mean): --  RR: 18 (23 Nov 2020 09:00) (18 - 18)  SpO2: 97% (23 Nov 2020 09:00) (97% - 99%)  Daily     Daily     LABS:                        7.8    2.65  )-----------( 157      ( 23 Nov 2020 05:58 )             24.8     11-23    140  |  106  |  25<H>  ----------------------------<  145<H>  4.4   |  25  |  1.44<H>    Ca    8.9      23 Nov 2020 05:58  Phos  3.3     11-23  Mg     2.0     11-23    TPro  5.8<L>  /  Alb  3.2<L>  /  TBili  0.1<L>  /  DBili  x   /  AST  15  /  ALT  15  /  AlkPhos  90  11-23    LIVER FUNCTIONS - ( 23 Nov 2020 05:58 )  Alb: 3.2 g/dL / Pro: 5.8 g/dL / ALK PHOS: 90 U/L / ALT: 15 U/L / AST: 15 U/L / GGT: x           PT/INR - ( 23 Nov 2020 05:58 )   PT: 12.3 sec;   INR: 1.03 ratio    PTT - ( 23 Nov 2020 05:58 )  PTT:29.3 sec      Imaging:

## 2020-11-23 NOTE — DIETITIAN INITIAL EVALUATION ADULT. - SIGNS/SYMPTOMS
multiple pressure injuries present as evidenced by B/L 3+ edema on lasix stage II sacral and left heel unstageable pressure injuries

## 2020-11-23 NOTE — PHYSICAL THERAPY INITIAL EVALUATION ADULT - PERTINENT HX OF CURRENT PROBLEM, REHAB EVAL
64 y/oM admitted 11/20 admitted for management of R non healing R heel ulcer. PMH IDDM, HLD, obesity, HFpEF with mild LV diastolic dysfunction, MGUS, chronic anemia with a history of duodenal ulcer 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 Underwent OLT (orthotopic liver transplant) on 7/2/2020, post op course c/b VRE bacteremia tx with linezolid and delirium likely in setting of CNI toxicity (cont below)

## 2020-11-23 NOTE — PROGRESS NOTE ADULT - SUBJECTIVE AND OBJECTIVE BOX
Follow Up:  Foot OM, OLT Recipient     Interval History:    REVIEW OF SYSTEMS  [  ] ROS unobtainable because:    [  ] All other systems negative except as noted below    Constitutional:  [ ] fever [ ] chills  [ ] weight loss  [ ] weakness  Skin:  [ ] rash [ ] phlebitis	  Eyes: [ ] icterus [ ] pain  [ ] discharge	  ENMT: [ ] sore throat  [ ] thrush [ ] ulcers [ ] exudates  Respiratory: [ ] dyspnea [ ] hemoptysis [ ] cough [ ] sputum	  Cardiovascular:  [ ] chest pain [ ] palpitations [ ] edema	  Gastrointestinal:  [ ] nausea [ ] vomiting [ ] diarrhea [ ] constipation [ ] pain	  Genitourinary:  [ ] dysuria [ ] frequency [ ] hematuria [ ] discharge [ ] flank pain  [ ] incontinence  Musculoskeletal:  [ ] myalgias [ ] arthralgias [ ] arthritis  [ ] back pain  Neurological:  [ ] headache [ ] seizures  [ ] confusion/altered mental status    Allergies  codeine (Anaphylaxis)        ANTIMICROBIALS:  meropenem  IVPB 1000 every 8 hours  piperacillin/tazobactam IVPB. 3.375 once  piperacillin/tazobactam IVPB.. 3.375 every 8 hours  trimethoprim   80 mG/sulfamethoxazole 400 mG 1 daily  valGANciclovir 450 daily      OTHER MEDS:  MEDICATIONS  (STANDING):  acetaminophen   Tablet .. 650 every 6 hours PRN  aspirin enteric coated 81 daily  dextrose 40% Gel 15 once  dextrose 50% Injectable 25 once  dextrose 50% Injectable 12.5 once  dextrose 50% Injectable 25 once  everolimus (ZORTRESS) 2 <User Schedule>  furosemide   Injectable 20 daily  glucagon  Injectable 1 once  insulin glargine Injectable (LANTUS) 15 at bedtime  insulin lispro (ADMELOG) corrective regimen sliding scale  three times a day before meals  insulin lispro (ADMELOG) corrective regimen sliding scale  at bedtime  insulin lispro Injectable (ADMELOG) 10 three times a day before meals  pantoprazole    Tablet 40 before breakfast  predniSONE   Tablet 5 daily  tamsulosin 0.4 at bedtime      Vital Signs Last 24 Hrs  T(C): 36.5 (23 Nov 2020 09:00), Max: 37.1 (22 Nov 2020 21:34)  T(F): 97.7 (23 Nov 2020 09:00), Max: 98.7 (22 Nov 2020 21:34)  HR: 91 (23 Nov 2020 09:00) (81 - 91)  BP: 125/62 (23 Nov 2020 09:00) (106/66 - 152/79)  BP(mean): --  RR: 18 (23 Nov 2020 09:00) (18 - 18)  SpO2: 97% (23 Nov 2020 09:00) (97% - 100%)    PHYSICAL EXAMINATION:  General: Alert and Awake, NAD  HEENT: PERRL, EOMI  Neck: Supple  Cardiac: RRR, No M/R/G  Resp: CTAB, No Wh/Rh/Ra  Abdomen: NBS, NT/ND, No HSM, No rigidity or guarding  MSK: No LE edema. No Calf tenderness  : No bob  Skin: No rashes or lesions. Skin is warm and dry to the touch.   Neuro: Alert and Awake. CN 2-12 Grossly intact. Moves all four extremities spontaneously.  Psych: Calm, Pleasant, Cooperative                          7.8    2.65  )-----------( 157      ( 23 Nov 2020 05:58 )             24.8       11-23    140  |  106  |  25<H>  ----------------------------<  145<H>  4.4   |  25  |  1.44<H>    Ca    8.9      23 Nov 2020 05:58  Phos  3.3     11-23  Mg     2.0     11-23    TPro  5.8<L>  /  Alb  3.2<L>  /  TBili  0.1<L>  /  DBili  x   /  AST  15  /  ALT  15  /  AlkPhos  90  11-23          MICROBIOLOGY:  v  Skin wound  11-21-20   Few Pseudomonas aeruginosa  Normal skin ninfa isolated  --  Pseudomonas aeruginosa      .Blood Blood-Peripheral  11-20-20   No growth to date.  --  --        CMV IgG Antibody: <0.20 U/mL (12-04-19 @ 08:33)  Toxoplasma IgG Screen: <3.0 IU/mL (12-04-19 @ 08:33)          RADIOLOGY:    <The imaging below has been reviewed and visualized by me independently. Findings as detailed in report below> Follow Up:  Foot OM, OLT Recipient     Interval History: continued right foot discomfort. afebrile.     REVIEW OF SYSTEMS  [  ] ROS unobtainable because:    [x  ] All other systems negative except as noted below    Constitutional:  [ ] fever [ ] chills  [ ] weight loss  [ ] weakness  Skin:  [ ] rash [ ] phlebitis	  Eyes: [ ] icterus [ ] pain  [ ] discharge	  ENMT: [ ] sore throat  [ ] thrush [ ] ulcers [ ] exudates  Respiratory: [ ] dyspnea [ ] hemoptysis [ ] cough [ ] sputum	  Cardiovascular:  [ ] chest pain [ ] palpitations [ ] edema	  Gastrointestinal:  [ ] nausea [ ] vomiting [ ] diarrhea [ ] constipation [ ] pain	  Genitourinary:  [ ] dysuria [ ] frequency [ ] hematuria [ ] discharge [ ] flank pain  [ ] incontinence  Musculoskeletal:  [ ] myalgias [ ] arthralgias [ ] arthritis  [ ] back pain +right foot discomfort.   Neurological:  [ ] headache [ ] seizures  [ ] confusion/altered mental status    Allergies  codeine (Anaphylaxis)        ANTIMICROBIALS:  meropenem  IVPB 1000 every 8 hours  piperacillin/tazobactam IVPB. 3.375 once  piperacillin/tazobactam IVPB.. 3.375 every 8 hours  trimethoprim   80 mG/sulfamethoxazole 400 mG 1 daily  valGANciclovir 450 daily      OTHER MEDS:  MEDICATIONS  (STANDING):  acetaminophen   Tablet .. 650 every 6 hours PRN  aspirin enteric coated 81 daily  dextrose 40% Gel 15 once  dextrose 50% Injectable 25 once  dextrose 50% Injectable 12.5 once  dextrose 50% Injectable 25 once  everolimus (ZORTRESS) 2 <User Schedule>  furosemide   Injectable 20 daily  glucagon  Injectable 1 once  insulin glargine Injectable (LANTUS) 15 at bedtime  insulin lispro (ADMELOG) corrective regimen sliding scale  three times a day before meals  insulin lispro (ADMELOG) corrective regimen sliding scale  at bedtime  insulin lispro Injectable (ADMELOG) 10 three times a day before meals  pantoprazole    Tablet 40 before breakfast  predniSONE   Tablet 5 daily  tamsulosin 0.4 at bedtime      Vital Signs Last 24 Hrs  T(C): 36.5 (23 Nov 2020 09:00), Max: 37.1 (22 Nov 2020 21:34)  T(F): 97.7 (23 Nov 2020 09:00), Max: 98.7 (22 Nov 2020 21:34)  HR: 91 (23 Nov 2020 09:00) (81 - 91)  BP: 125/62 (23 Nov 2020 09:00) (106/66 - 152/79)  BP(mean): --  RR: 18 (23 Nov 2020 09:00) (18 - 18)  SpO2: 97% (23 Nov 2020 09:00) (97% - 100%)    PHYSICAL EXAMINATION:  General: Alert and Awake, NAD  HEENT: PERRL, EOMI  Cardiac: RRR, No M/R/G  Resp: CTAB, No Wh/Rh/Ra  Abdomen: OLT surgical scar well healed. NBS, NT/ND, No HSM, No rigidity or guarding  MSK: R Heel wound with overlying dressing. 1+ bilateral LE edema. No stigmata of IE. No evidence of phlebitis. No evidence of synovitis.  : No bob  Skin: No rashes or lesions. Skin is warm and dry to the touch.   Neuro: Alert and Awake. CN 2-12 Grossly intact. Moves all four extremities spontaneously.  Psych: Calm, Pleasant, Cooperative                            7.8    2.65  )-----------( 157      ( 23 Nov 2020 05:58 )             24.8       11-23    140  |  106  |  25<H>  ----------------------------<  145<H>  4.4   |  25  |  1.44<H>    Ca    8.9      23 Nov 2020 05:58  Phos  3.3     11-23  Mg     2.0     11-23    TPro  5.8<L>  /  Alb  3.2<L>  /  TBili  0.1<L>  /  DBili  x   /  AST  15  /  ALT  15  /  AlkPhos  90  11-23          MICROBIOLOGY:  v  Skin wound  11-21-20   Few Pseudomonas aeruginosa  Normal skin ninfa isolated  --  Pseudomonas aeruginosa      .Blood Blood-Peripheral  11-20-20   No growth to date.  --  --        CMV IgG Antibody: <0.20 U/mL (12-04-19 @ 08:33)  Toxoplasma IgG Screen: <3.0 IU/mL (12-04-19 @ 08:33)          RADIOLOGY:    <The imaging below has been reviewed and visualized by me independently. Findings as detailed in report below>    EXAM:  MR FOOT RT                        PROCEDURE DATE:  11/21/2020    Markedly limited study.  Possible early osteomyelitis at the plantar and lateral aspect of the calcaneus with adjacent soft tissue ulceration.  Repeat study is recommended when the patient is able to complete entire study.

## 2020-11-23 NOTE — DIETITIAN INITIAL EVALUATION ADULT. - ADD RECOMMEND
1.) Change diet to low sodium, Consistent carbohydrate per MD orders 2.) Add multivitamin with minerals + vitamin C daily provided no other contraindications present 3.) Add glucerna 1x daily to 4.) Defer fluid requirements to team 5) Education reinforcement to be provided PRN 6.) Monitor pt's PO intake, weight, skin, edema, GI distress 1.) Consider diet change to low sodium, Consistent carbohydrate with evening snack in setting of history of HF 2.) Add glucerna 1x daily (220kcal, 10g protein) 3.) Add Gee BID, multivitamin with minerals + vitamin C daily if medically feasible to promote wound healing 4) Education reinforcement to be provided PRN 5.) Monitor pt's PO intake, weight, skin, edema, GI distress

## 2020-11-23 NOTE — PHYSICAL THERAPY INITIAL EVALUATION ADULT - SIT-TO-STAND BALANCE
Forms received and on MD's desk for review and signature if appropriate   good balance/with UE support

## 2020-11-23 NOTE — DIETITIAN INITIAL EVALUATION ADULT. - PHYSCIAL ASSESSMENT
obese BMI 32.3kg/m2.   Pressure injuries per Nursing flow sheets: sacrum stage II; R. heel unstageable. Pressure injuries per Nursing flow sheets: sacrum stage II; R. heel unstageable.

## 2020-11-23 NOTE — PROGRESS NOTE ADULT - ASSESSMENT
64M w/ R foot posterior heel wound  - Pt seen and evaluated  - R foot posterior heel wound to bone, 3.0x3.0, depth to bone, wound bed fibrotic, sanguinous drainage, no malodor, pierwound erythematous, etiology 2/2 pressure  - Probing bone with soft/friable density  - NM performed and to be completed to r/o osteo  - MRI showing early calc osteo  - Consented and booked for right foot partial calcanectomy with graft application on Tues 11/24 at 3:30pm with Dr. Wnag  - Please evaluate for medical/cardiac optimization for procedure under sedation with local  - NPO after midnight  - Seen w/ attending

## 2020-11-23 NOTE — DIETITIAN INITIAL EVALUATION ADULT. - OTHER INFO
Diet: Consistent Carbohydrate Diet with evening snack. Pt reports good appetite/PO intake (100%) during admission. Amenable to glucerna 1x/daily. Note Pt with DM on lantus, admelog, and admeolg sliding scale in house. Manages with Lantus and Humalog pen at home. Checks BG 3x/day at home with typical reading 250-260 after last admission however gradual decrease to 180s prior to current admission. Note pt on prednisone at home and in house. Denies N/V/diarrhea/constipation. Last BM 11/23. Denies any difficulty chewing/swallowing. Ht 6'1" and Wt 225lbs 11/21 (standing). BMI 32.3kg.m2. Pt reports taking daily wt at home with UBW wt around 240/250lb in march with gradual wt loss to 230lbs however has noted gradual increase x 2 weeks. Note pt with edema on Lasix in house. Per previous RD note wt 250lb 3/2020 and 238lb 6/2020.   Pt was educated on low sodium and consistent CHO diet including not adding salt to foods, low sodium food items, consistent CHO intake, pairing CHO with protein, and the importance of fiber intake. Pt was also educated on the importance of energy and protein intake for wound healing. Pt verbalized understanding and seems likely to adhere to diet recommendations. Diet: Consistent Carbohydrate Diet with evening snack. Pt reports good appetite/PO intake (100%) during admission. Amenable to Glucerna 1x/daily. Denies N/V/diarrhea/constipation. Last BM 11/23. Denies any difficulty chewing/swallowing.     Note pt with DM and HgA1c 8.6% indicating suboptimal glycemic control PT. On Lantus, admelog, and admelog sliding scale in house. Manages with Lantus and Humalog pen at home. Checks BG 3x/day at home with typical reading 250-260 after last admission however gradual decrease to 180s prior to current admission. Note pt on prednisone at home and in house.     Pt with dosing wt of 245lb (11/20) and current standing wt 255lbs (11/21). Pt reports taking daily wt at home with UBW around 240/250lb in March with gradual wt loss to 230lbs however has noted gradual increase x 2 weeks - may be due to fluid retention in setting of edema, note pt with edema in house being diuresed. Weights per previous RD notes: 250lb (3/2020); 238lb (6/2020).     Pt was educated on low sodium and consistent carbohydrate diet including not adding salt to foods, low sodium food items, consistent carbohydrate intake, pairing carbohydrate with protein, and the importance of fiber intake. Pt was also educated on the importance of energy and protein intake for wound healing. Pt verbalized understanding and seems likely to adhere to diet recommendations. Handouts provided at bedside.

## 2020-11-23 NOTE — PROGRESS NOTE ADULT - SUBJECTIVE AND OBJECTIVE BOX
Transplant Surgery - Multidisciplinary Rounds  --------------------------------------------------------------  OLT 7/2/2020    Present:   Patient seen and examined with multidisciplinary team including Transplant Surgeon: Dr. Chris, Dr. Garcia, Dr. Lucio,  Transplant Nephrologist: Dr. Robin Patel, Pharmacist: Ancelmo Blanca, CARLITOSs Otilia/Xavi and unit RN during am rounds.  Disciplines not in attendance will be notified of the plan.     HPI: 64M with IDDM, HLD, obesity, HFpEF with mild LV diastolic dysfunction, MGUS, chronic anemia with a history of duodenal ulcer 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  Known history of non healing R heel ulcer. Followed by podiatry outpatient Dr. Patel, had recent debridement wound (10/20) cx grew ec faecalis, pseudomonas, enterococcus. Tx with cipro.  No improvement post debridement and PO antibiotic treatment.    Admitted with non healing R heel ulcer. MRI c/w early OM. Podiatry and ID consulted. Wound cultured. Broadened to meropenem.   MMF held. Everolimus dose reduced.     Interval Events:   - afebrile, stable VSS  - WBC 2.65, MMF held, Valcyte dose reduced (450mg daily)  - SCr 1.4 (baseline ~1.3), Reduced Lasix 20mg IV daily   - Podiatry following: wound dressing changes with dakins solution.   - 11/21 Wound cultures: pseudomonas, sensitivities pending      On Meropenem      Potential Discharge date: pending clinical improvement     Education:  Medications    Plan of care:  See Below      MEDICATIONS  (STANDING):  aspirin enteric coated 81 milliGRAM(s) Oral daily  collagenase Ointment 1 Application(s) Topical daily  Dakins Solution - 1/2 Strength 1 Application(s) Topical daily  dextrose 40% Gel 15 Gram(s) Oral once  dextrose 5%. 1000 milliLiter(s) (50 mL/Hr) IV Continuous <Continuous>  dextrose 5%. 1000 milliLiter(s) (100 mL/Hr) IV Continuous <Continuous>  dextrose 50% Injectable 25 Gram(s) IV Push once  dextrose 50% Injectable 12.5 Gram(s) IV Push once  dextrose 50% Injectable 25 Gram(s) IV Push once  everolimus (ZORTRESS) 2 milliGRAM(s) Oral <User Schedule>  furosemide   Injectable 20 milliGRAM(s) IV Push daily  glucagon  Injectable 1 milliGRAM(s) IntraMuscular once  insulin glargine Injectable (LANTUS) 15 Unit(s) SubCutaneous at bedtime  insulin lispro (ADMELOG) corrective regimen sliding scale   SubCutaneous three times a day before meals  insulin lispro (ADMELOG) corrective regimen sliding scale   SubCutaneous at bedtime  insulin lispro Injectable (ADMELOG) 10 Unit(s) SubCutaneous three times a day before meals  magnesium oxide 800 milliGRAM(s) Oral with breakfast  meropenem  IVPB 1000 milliGRAM(s) IV Intermittent every 8 hours  pantoprazole    Tablet 40 milliGRAM(s) Oral before breakfast  piperacillin/tazobactam IVPB. 3.375 Gram(s) IV Intermittent once  piperacillin/tazobactam IVPB.. 3.375 Gram(s) IV Intermittent every 8 hours  predniSONE   Tablet 5 milliGRAM(s) Oral daily  tamsulosin 0.4 milliGRAM(s) Oral at bedtime  trimethoprim   80 mG/sulfamethoxazole 400 mG 1 Tablet(s) Oral daily  valGANciclovir 450 milliGRAM(s) Oral daily    MEDICATIONS  (PRN):  acetaminophen   Tablet .. 650 milliGRAM(s) Oral every 6 hours PRN Temp greater or equal to 38C (100.4F), Mild Pain (1 - 3)      PAST MEDICAL & SURGICAL HISTORY:  GIB (gastrointestinal bleeding)    GERD with esophagitis  Gastritis &amp; Non Bleeding Ulcers    Hepatic encephalopathy    Obesity    Fatty liver disease, nonalcoholic    Renal stones  25 years ago    Hypertension    Neuropathy    Hypercholesteremia    Diabetes    S/P cholecystectomy        Vital Signs Last 24 Hrs  T(C): 36.5 (23 Nov 2020 09:00), Max: 37.1 (22 Nov 2020 21:34)  T(F): 97.7 (23 Nov 2020 09:00), Max: 98.7 (22 Nov 2020 21:34)  HR: 91 (23 Nov 2020 09:00) (81 - 91)  BP: 125/62 (23 Nov 2020 09:00) (106/66 - 152/79)  BP(mean): --  RR: 18 (23 Nov 2020 09:00) (18 - 18)  SpO2: 97% (23 Nov 2020 09:00) (97% - 100%)    I&O's Summary    22 Nov 2020 07:01  -  23 Nov 2020 07:00  --------------------------------------------------------  IN: 375 mL / OUT: 2175 mL / NET: -1800 mL    23 Nov 2020 07:01  -  23 Nov 2020 11:24  --------------------------------------------------------  IN: 0 mL / OUT: 1350 mL / NET: -1350 mL                              7.8    2.65  )-----------( 157      ( 23 Nov 2020 05:58 )             24.8     11-23    140  |  106  |  25<H>  ----------------------------<  145<H>  4.4   |  25  |  1.44<H>    Ca    8.9      23 Nov 2020 05:58  Phos  3.3     11-23  Mg     2.0     11-23    TPro  5.8<L>  /  Alb  3.2<L>  /  TBili  0.1<L>  /  DBili  x   /  AST  15  /  ALT  15  /  AlkPhos  90  11-23          Culture - Other (collected 11-21-20 @ 01:03)  Source: Skin wound  Final Report (11-23-20 @ 11:16):    Few Pseudomonas aeruginosa    Normal skin ninfa isolated  Organism: Pseudomonas aeruginosa (11-23-20 @ 11:16)  Organism: Pseudomonas aeruginosa (11-23-20 @ 11:16)    Culture - Blood (collected 11-20-20 @ 23:02)  Source: .Blood Blood-Venous  Preliminary Report (11-22-20 @ 01:02):    No growth to date.    Culture - Blood (collected 11-20-20 @ 23:02)  Source: .Blood Blood-Peripheral  Preliminary Report (11-22-20 @ 01:02):    No growth to date.        Review of systems  Gen: No weight changes, fatigue, fevers/chills, weakness  Skin: R heel wound  Head/Eyes/Ears/Mouth: No headache; Normal hearing; Normal vision w/o blurriness; No sinus pain/discomfort, sore throat  Respiratory: No dyspnea, cough, wheezing, hemoptysis  CV: No chest pain, PND, orthopnea  GI: no abdominal pain  : No increased frequency, dysuria, hematuria, nocturia  MSK: No joint pain/swelling; no back pain; + LE edema  Neuro: No dizziness/lightheadedness, weakness, seizures, numbness, tingling  Heme: No easy bruising or bleeding  Endo: No heat/cold intolerance  Psych: No significant nervousness, anxiety, stress, depression  All other systems were reviewed and are negative, except as noted.    Physical Exam:   GENERAL:  No acute distress  HEENT:  Normocephalic/atraumatic, no scleral icterus  CHEST:  Clear to auscultation bilaterally, no wheezes/rales/ronchi, no accessory muscle use  HEART:  Regular rate and rhythm, no murmurs/rubs/gallops  ABDOMEN:  Soft, non-tender, non-distended,  well healed incision  EXTREMITIES: RLE with large 4x4 cm ulcer, with overlying granulation tissue and surrounding erythema, some visible bone. +1 BLE edema.  SKIN:  No rash/erythema/ecchymoses/petechiae/wounds/abscess/warm/dry  NEURO:  Alert and oriented x 3, no asterixis

## 2020-11-23 NOTE — CONSULT NOTE ADULT - ASSESSMENT
s/p liver transplant  osteomyelitis     cont regular diet  IV antibiotics per ID  rejection regimen per transplant service  oob as tolerated

## 2020-11-23 NOTE — PHYSICAL THERAPY INITIAL EVALUATION ADULT - GENERAL OBSERVATIONS, REHAB EVAL
Received pt bedside. IV disconnected by nurse. /62, HR 83. Yahir NICHOLSON surgical shoe with assist, CANDIS trevino

## 2020-11-23 NOTE — PROGRESS NOTE ADULT - ATTENDING COMMENTS
s/p OLT in July 2020 with infected heel wound  cont abx  Cont current immunosuppression with Everolimus 2mg bid, Prednisone 5mg daily, Cellcept held due to infection  Will check everolimus level and adjust dose accordingly.  f/u WBC scan  f/u podiatry re: debridement vs amputation

## 2020-11-23 NOTE — DIETITIAN INITIAL EVALUATION ADULT. - CHIEF COMPLAINT
The patient is a 64y Male with PMHx IDDM, HLD, obesity, HFpEF, MGUS, chronic anemia, hx of duodenal ulcer, GAVE, duodenal AVM s/p APC (last on 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 hx of non healing R heel ulcer with recent debridement wound cx grew ec faecalis, pseudomonas, enterococcus. Tx with cipro. No improvement post debridement and PO antibiotic treatment.  Admitted for further management

## 2020-11-23 NOTE — PROGRESS NOTE ADULT - ASSESSMENT
64M JEAN Cirrhosis s/p OLT (7/2/20 at [facility]), IDDM, MGUS who presented to [facility] on 11/20 with worsening drainage at his right heel with concern for osteomyelitis.    Had a debridement in 10/2020 with cultures growing fairly broadly susceptible Enterococcus faecalis, Pseudomonas and Enterobacter.   Patient was treated with Ciprofloxacin Patient has continued to have right heel drainage and wound at the heel.     COVID19 PCR (11/20) negative.   XR Foot (11/20) with no fracture dislocation or evidence of OM.   ESR of 65.   CRP of 3.73.     Per podiatry evaluation wound probes to bone  High concern for OM  MRI with concern for OM  Surgical swab culture sent by podiatry with Pseudomonas (R to Cipro and I to imipenem)  MRSA/MSSA PCR Negative/Negative    We can deescalate the Meropenem to Cefepime  Requested to maintain patient on broader gram positive coverage  Can continue Vancomycin for now  If no further growth on abscess cultures suspect Cefepime alone should be sufficient (especially with the negative MRSA nasal PCR)     RECOMMENDATIONS:    #R Heel Wound, Suspected Heel Osteomyelitis, Positive Culture Findings, Therapeutic Drug Monitoring  --Stop Meropenem  --Start Cefepime 2g IV Q12H  --Can continue Vancomycin for now. Would dose at 1.25g IV Q24H. Check trough prior to third dose. Target trough of 10-15  --Continue to monitor renal function and vancomycin levels to avoid nephrotoxicity / otoxicity secondary to vancomycin  --Followup on prelim blood cultures   --Followup on prelim foot surgical swab cultures   --Work for possible surgical intervention per podiatry    #Liver Transplant Recipient, Prophylactic Antibiotic (CMV +/-)  --Continue Valcyte 450 mg PO Q24H (CrCl currently 50-59)  --Continue Bactrim SS PO QD    I will continue to follow. Please feel free to contact me with any further questions.    Eusebio Pérez M.D.  [facility] Division of Infectious Disease  8AM-5PM: Pager Number [phone]  After Hours (or if no response): Please contact the Infectious Diseases Office at [phone]     The above assessment and plan were discussed with transplant surgery team

## 2020-11-23 NOTE — DIETITIAN INITIAL EVALUATION ADULT. - ORAL INTAKE PTA/DIET HISTORY
Per pt, good appetite/intake PTA. Typical diet recall revealed consistent carbohydrate intake and pairing CHO with protein. Pt reports he avoids concentrated sweets/sugary beverages and does not cook with any salt however occasionally adds salt to food. Note pt with DM and HgA1c 6.8% indicating suboptimal glycemic control. Pt denies taking any supplements PTA, however, per H&P, was taking magnesium. NKFA. Per pt, good appetite/intake PTA. Typical diet recall revealed consistent carbohydrate intake and pairing CHO with protein. Pt reports he avoids concentrated sweets/sugary beverages and does not cook with any salt however occasionally adds salt to food. Pt denies taking any supplements PTA, however, per H&P, was taking magnesium. NKFA.

## 2020-11-23 NOTE — PROGRESS NOTE ADULT - SUBJECTIVE AND OBJECTIVE BOX
Patient is a 64y old  Male who presents with a chief complaint of R heel wound (23 Nov 2020 13:36)       INTERVAL HPI/OVERNIGHT EVENTS:  Patient seen and evaluated at bedside.  Pt is resting comfortable in NAD. Denies N/V/F/C.     Allergies    codeine (Anaphylaxis)    Intolerances        Vital Signs Last 24 Hrs  T(C): 36.8 (23 Nov 2020 13:00), Max: 37.1 (22 Nov 2020 21:34)  T(F): 98.2 (23 Nov 2020 13:00), Max: 98.7 (22 Nov 2020 21:34)  HR: 83 (23 Nov 2020 15:19) (81 - 91)  BP: 122/62 (23 Nov 2020 15:19) (106/66 - 143/75)  BP(mean): --  RR: 18 (23 Nov 2020 13:00) (18 - 18)  SpO2: 100% (23 Nov 2020 13:00) (97% - 100%)    LABS:                        7.8    2.65  )-----------( 157      ( 23 Nov 2020 05:58 )             24.8     11-23    140  |  106  |  25<H>  ----------------------------<  145<H>  4.4   |  25  |  1.44<H>    Ca    8.9      23 Nov 2020 05:58  Phos  3.3     11-23  Mg     2.0     11-23    TPro  5.8<L>  /  Alb  3.2<L>  /  TBili  0.1<L>  /  DBili  x   /  AST  15  /  ALT  15  /  AlkPhos  90  11-23    PT/INR - ( 23 Nov 2020 05:58 )   PT: 12.3 sec;   INR: 1.03 ratio         PTT - ( 23 Nov 2020 05:58 )  PTT:29.3 sec    CAPILLARY BLOOD GLUCOSE      POCT Blood Glucose.: 179 mg/dL (23 Nov 2020 12:00)  POCT Blood Glucose.: 144 mg/dL (23 Nov 2020 08:03)  POCT Blood Glucose.: 199 mg/dL (22 Nov 2020 21:38)  POCT Blood Glucose.: 216 mg/dL (22 Nov 2020 18:05)      Lower Extremity Physical Exam:  Vascular: DP/PT non-palpable 2/2 edema R foot, L foot unna boot left clean dry and intact, CFT <3 seconds B/L, Temperature gradient R warm to warm  Neuro: Epicritic sensation intact to the level of heel, B/L.  Musculoskeletal/Ortho: R foot pain on palp of posterior heel  Skin: R foot posterior heel wound to bone, 3.0x3.0, depth to bone, wound bed fibrotic, sanguinous drainage, no malodor, pierwound erythematous, etiology 2/2 pressure  Probing to heel bone with very friable/soft density    RADIOLOGY & ADDITIONAL TESTS:    < from: MR Foot No Cont, Right (11.21.20 @ 12:58) >  EXAM:  MR FOOT RT                            PROCEDURE DATE:  11/21/2020            INTERPRETATION:  RIGHT FOOT MRI    CLINICAL INFORMATION: Right foot pain and swelling. Evaluate for prostate myelitis.  TECHNIQUE: Multiplanar, multisequence MRI was obtained of the right foot.    FINDINGS:    The study is markedly limited as there are only 3 sequences which are motion degraded.    There appears to be soft tissue ulceration of the plantar aspect of the calcaneus with bone marrow edema within thesubcortical bone of the lateral aspect of the posterior calcaneus without definitive low T1 signal. This may represent early osteomyelitis. There is marked bone marrow edema at the plantar aspect of the talus laterally with associated low T1 signal. This appears to be degenerative in nature but is difficult to evaluate given the lack of multiplanar imaging.    Mild midfoot arthrosis is present at the TMT joints.    There is marked dorsal subcutaneous soft tissue edema throughout the midfoot and forefoot. Circumferential subcutaneous soft tissue edema seen at the ankle.    IMPRESSION:Markedly limited study.    Possible early osteomyelitis at the plantar and lateral aspect of the calcaneus with adjacent soft tissue ulceration.    Repeat study is recommended when the patient is able to complete entire study.              LYUBOV VO MD; Resident Interventional Radiology  This document has been electronically signed.  CHAUNCEY KELSEY MD; Attending Radiologist  This document has been electronically signed. Nov 21 2020  2:24PM    < end of copied text >

## 2020-11-23 NOTE — PROGRESS NOTE ADULT - ATTENDING COMMENTS
Pt. did not have follow-up with endocrinology since his transplant as no appointment was available. HbA1c > 8%. Will need closer f/u after discharge. Pt. did not have follow-up with endocrinology since his transplant as no appointment was available. HbA1c > 8%. Will need closer f/u after discharge.  No liver or transplant-related contraindication to planned foot surgery tomorrow.

## 2020-11-23 NOTE — CONSULT NOTE ADULT - SUBJECTIVE AND OBJECTIVE BOX
[facility]  Ramón Morales PA-C  [address] [place] [address]  [phone]      Chief Complaint:  Patient is a 64y old  Male who presents with a chief complaint of R heel wound (23 Nov 2020 15:40)      HPI: 64M with IDDM, HLD, obesity, HFpEF with mild LV diastolic dysfunction, MGUS, chronic anemia with a history of duodenal ulcer 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  Known history of non healing R heel ulcer. Followed by podiatry outpatient Dr. Patel, had recent debridement wound cx grew ec faecalis, pseudomonas, enterococcus. Tx with cipro. No improvement post debridement and PO antibiotic treatment.     Allergies:  codeine (Anaphylaxis)      Medications:  acetaminophen   Tablet .. 650 milliGRAM(s) Oral every 6 hours PRN  aspirin enteric coated 81 milliGRAM(s) Oral daily  cefepime   IVPB 2000 milliGRAM(s) IV Intermittent every 12 hours  collagenase Ointment 1 Application(s) Topical daily  Dakins Solution - 1/2 Strength 1 Application(s) Topical daily  dextrose 40% Gel 15 Gram(s) Oral once  dextrose 5%. 1000 milliLiter(s) IV Continuous <Continuous>  dextrose 5%. 1000 milliLiter(s) IV Continuous <Continuous>  dextrose 50% Injectable 25 Gram(s) IV Push once  dextrose 50% Injectable 12.5 Gram(s) IV Push once  dextrose 50% Injectable 25 Gram(s) IV Push once  everolimus (ZORTRESS) 2 milliGRAM(s) Oral <User Schedule>  furosemide   Injectable 20 milliGRAM(s) IV Push daily  glucagon  Injectable 1 milliGRAM(s) IntraMuscular once  insulin glargine Injectable (LANTUS) 8 Unit(s) SubCutaneous at bedtime  insulin lispro (ADMELOG) corrective regimen sliding scale   SubCutaneous three times a day before meals  insulin lispro (ADMELOG) corrective regimen sliding scale   SubCutaneous at bedtime  insulin lispro Injectable (ADMELOG) 10 Unit(s) SubCutaneous three times a day before meals  magnesium oxide 800 milliGRAM(s) Oral with breakfast  pantoprazole    Tablet 40 milliGRAM(s) Oral before breakfast  predniSONE   Tablet 5 milliGRAM(s) Oral daily  tamsulosin 0.4 milliGRAM(s) Oral at bedtime  trimethoprim   80 mG/sulfamethoxazole 400 mG 1 Tablet(s) Oral daily  valGANciclovir 450 milliGRAM(s) Oral daily      PMHX/PSHX:  GIB (gastrointestinal bleeding)    GERD with esophagitis    Hepatic encephalopathy    Obesity    Fatty liver disease, nonalcoholic    Renal stones    Hypertension    Neuropathy    Hypercholesteremia    Diabetes    S/P cholecystectomy    No significant past surgical history        Family history:  Family history of type 2 diabetes mellitus    Family history of hypertension    Family history of stomach cancer    No pertinent family history in first degree relatives        Social History:     ROS:     General:  No wt loss, fevers, chills, night sweats, fatigue,   Eyes:  Good vision, no reported pain  ENT:  No sore throat, pain, runny nose, dysphagia  CV:  No pain, palpitations, hypo/hypertension  Resp:  No dyspnea, cough, tachypnea, wheezing  GI:  No pain, No nausea, No vomiting, No diarrhea, No constipation, No weight loss, No fever, No pruritis, No rectal bleeding, No tarry stools, No dysphagia,  :  No pain, bleeding, incontinence, nocturia  Muscle:  No pain, weakness  Neuro:  No weakness, tingling, memory problems  Psych:  No fatigue, insomnia, mood problems, depression  Endocrine:  No polyuria, polydipsia, cold/heat intolerance  Heme:  No petechiae, ecchymosis, easy bruisability  Skin:  No rash, tattoos, scars, edema      PHYSICAL EXAM:   Vital Signs:  Vital Signs Last 24 Hrs  T(C): 36.8 (23 Nov 2020 13:00), Max: 37.1 (22 Nov 2020 21:34)  T(F): 98.2 (23 Nov 2020 13:00), Max: 98.7 (22 Nov 2020 21:34)  HR: 83 (23 Nov 2020 15:19) (81 - 91)  BP: 122/62 (23 Nov 2020 15:19) (106/66 - 143/75)  BP(mean): --  RR: 18 (23 Nov 2020 13:00) (18 - 18)  SpO2: 100% (23 Nov 2020 13:00) (97% - 100%)  Daily     Daily     GENERAL:  Appears stated age, well-groomed, well-nourished, no distress  HEENT:  NC/AT,  conjunctivae clear and pink, no thyromegaly, nodules, adenopathy, no JVD, sclera -anicteric  CHEST:  Full & symmetric excursion, no increased effort, breath sounds clear  HEART:  Regular rhythm, S1, S2, no murmur/rub/S3/S4, no abdominal bruit, no edema  ABDOMEN:  Soft, non-tender, non-distended, normoactive bowel sounds,  no masses ,no hepato-splenomegaly, no signs of chronic liver disease  EXTEREMITIES:  no cyanosis,clubbing or edema  SKIN:  No rash/erythema/ecchymoses/petechiae/wounds/abscess/warm/dry  NEURO:  Alert, oriented, no asterixis, no tremor, no encephalopathy    LABS:                        7.8    2.65  )-----------( 157      ( 23 Nov 2020 05:58 )             24.8     11-23    140  |  106  |  25<H>  ----------------------------<  145<H>  4.4   |  25  |  1.44<H>    Ca    8.9      23 Nov 2020 05:58  Phos  3.3     11-23  Mg     2.0     11-23    TPro  5.8<L>  /  Alb  3.2<L>  /  TBili  0.1<L>  /  DBili  x   /  AST  15  /  ALT  15  /  AlkPhos  90  11-23    LIVER FUNCTIONS - ( 23 Nov 2020 05:58 )  Alb: 3.2 g/dL / Pro: 5.8 g/dL / ALK PHOS: 90 U/L / ALT: 15 U/L / AST: 15 U/L / GGT: x           PT/INR - ( 23 Nov 2020 05:58 )   PT: 12.3 sec;   INR: 1.03 ratio         PTT - ( 23 Nov 2020 05:58 )  PTT:29.3 sec        Imaging:

## 2020-11-23 NOTE — PROGRESS NOTE ADULT - ASSESSMENT
65yo M with PMH of DM, HLD, GERD, HFpEF with mild LV diastolic dysfunction, and decompensated JEAN cirrhosis, duodenal ulcer, GAVE and duodenal AVM s/p APC (last on 10/11/19), SHENG, who is now s/p liver transplant (7/2/20) with post-op course c/b tacrolimus toxicity and hospital delirium improving after stopping CNI and replacing w/ everolimus. Patient now presents 11/20/20 with worsening LE swelling and right foot wound concerning for OM.    # OM of right calcaneus - Associated with chronic non-healing right foot ulcer with probe to bone; suspected poor wound healing in setting of uncontrolled DM2 and immunosuppression  - MRI 11/21 limited study demonstrates involvement of plantar and lateral aspects of bone. No systemic signs of infection. On broad spectrum antibiotics given history of infections with MDR organisms (VRE bacteremia).     # S/p liver transplant 7/2/20: Transaminases and markers of synthetic function are normal with minimal immunosuppression.    Recipient Info:   ABO: A  CMV:  Neg  EBV Positive  Donor:  Donor ID:  [id number] Match: [id number]  Age: 29 ABO:  A1 High Risk:   No COD: Cardiovascular  Anti CMV positive, EBV IgG- positive  HepBcAb-neg, Hepatitis C-DANA- neg, Hepatitis C ab-neg    # Sacral decubitus ulcer: stage II without signs of infection   # Type 2 diabetes:  A1c 8.1% and blood sugars elevated above goal -likely contributing to poor wound healing   # SHENG       Recommendations:  - c/w meropenem as per Transplant ID  - c/w bactrim SS PO daily and valganciclovir 450mg daily for prophylaxis  - continue to hold MMF  - continue everolimus dosing by trough (currently 2mg q12); follow-up trough level  - c/w prednisone 5mg qd  - continue Lasix 20mg PO daily  - daily CMP and INR for MELD-Na  - Transplant Hep to follow   65yo M with PMH of DM, HLD, GERD, HFpEF with mild LV diastolic dysfunction, and decompensated JEAN cirrhosis, duodenal ulcer, GAVE and duodenal AVM s/p APC (last on 10/11/19), SHENG, who is now s/p liver transplant (7/2/20) with post-op course c/b tacrolimus toxicity and hospital delirium improving after stopping CNI and replacing w/ everolimus. Patient now presents 11/20/20 with worsening LE swelling and right foot wound concerning for OM.    # OM of right calcaneus - Associated with chronic non-healing right foot ulcer with probe to bone; suspected poor wound healing in setting of uncontrolled DM2 and immunosuppression  - MRI 11/21 limited study demonstrates involvement of plantar and lateral aspects of bone. No systemic signs of infection. On broad spectrum antibiotics given history of infections with MDR organisms (VRE bacteremia).     # S/p liver transplant 7/2/20: Transaminases and markers of synthetic function are normal with minimal immunosuppression.    Recipient Info:   ABO: A  CMV:  Neg  EBV Positive  Donor:  Donor ID:  [id number] Match: [id number]  Age: 29 ABO:  A1 High Risk:   No COD: Cardiovascular  Anti CMV positive, EBV IgG- positive  HepBcAb-neg, Hepatitis C-DANA- neg, Hepatitis C ab-neg    # Sacral decubitus ulcer: stage II without signs of infection   # Type 2 diabetes:  A1c 8.1% and blood sugars elevated above goal -likely contributing to poor wound healing   # SHENG     Recommendations:  - c/w meropenem as per Transplant ID  - c/w bactrim SS PO daily and valganciclovir 450mg daily for prophylaxis  - continue to hold MMF  - continue everolimus dosing by trough (currently 2mg q12); follow-up trough level  - c/w prednisone 5mg qd  - continue Lasix 20mg PO daily  - daily CMP and INR for MELD-Na  - Transplant Hep to follow      Vivian Guardado PGY-5  Gastroenterology Fellow  Page #[phone]   Page #[phone] 5pm-7am on weekdays, and on weekends     65yo M with PMH of DM, HLD, GERD, HFpEF with mild LV diastolic dysfunction, and decompensated JEAN cirrhosis, duodenal ulcer, GAVE and duodenal AVM s/p APC (last on 10/11/19), SHENG, who is now s/p liver transplant (7/2/20) with post-op course c/b tacrolimus toxicity and hospital delirium improving after stopping CNI and replacing w/ everolimus. Patient now presents 11/20/20 with worsening LE swelling and right foot wound concerning for OM.    # OM of right calcaneus - Associated with chronic non-healing right foot ulcer with probe to bone; suspected poor wound healing in setting of uncontrolled DM2 and immunosuppression  - MRI 11/21 limited study demonstrates involvement of plantar and lateral aspects of bone. No systemic signs of infection. On broad spectrum antibiotics given history of infections with MDR organisms (VRE bacteremia).     # S/p liver transplant 7/2/20: Transaminases and markers of synthetic function are normal with minimal immunosuppression.    Recipient Info:   ABO: A  CMV:  Neg  EBV Positive  Donor:  Donor ID:  [id number] Match: [id number]  Age: 29 ABO:  A1 High Risk:   No COD: Cardiovascular  Anti CMV positive, EBV IgG- positive  HepBcAb-neg, Hepatitis C-DANA- neg, Hepatitis C ab-neg    # Sacral decubitus ulcer: stage II without signs of infection   # Type 2 diabetes:  A1c 8.1% and blood sugars elevated above goal -likely contributing to poor wound healing   # SHENG     Recommendations:  - c/w meropenem as per Transplant ID  - c/w bactrim SS PO daily and valganciclovir 450mg daily for prophylaxis  - continue to hold MMF  - continue everolimus dosing by trough (currently 2mg q12); follow-up trough level, goal 3 ng/mL  - c/w prednisone 5mg qd  - continue Lasix 20mg PO daily  - daily CMP and INR for MELD-Na  - Transplant Hep to follow      Vivian Guardado PGY-5  Gastroenterology Fellow  Page #[phone]   Page #[phone] 5pm-7am on weekdays, and on weekends

## 2020-11-23 NOTE — DIETITIAN INITIAL EVALUATION ADULT. - REASON
Nutrition focused physical exam not warranted at this time. Upon visual assessment pt with no overt signs of muscle wasting/fat loss.

## 2020-11-23 NOTE — PROGRESS NOTE ADULT - ASSESSMENT
64M with IDDM, HLD, obesity, HFpEF with mild LV diastolic dysfunction, MGUS, chronic anemia with a history of duodenal ulcer 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  Known history of non healing R heel ulcer. Followed by podiatry outpatient Dr. Patel, had recent debridement wound (10/20) cx grew ec faecalis, pseudomonas, enterococcus. Tx with Cipro without improvement now admitted for IV antibiotics and further intervention from podiatry    [] R heel ulcer/Osteomyelitis  - MRI c/w early OM  - 11/21 Wound culture growing few pseudomonas.  Continue Meropenem and add Vanco for gram positive coverage pending sensitivities  - Tentative OR tomorrow 11/24 with podiatry for possible right foot calcanectomy with graft application.  Will obtain medical clearance for procedure under sedation with local  - MMF held. Everolimus dose reduced.   - Physical therapy eval    [] s/p OLT with good graft function   - Immuno: Everolimus 2/2, MMF on hold in setting of infection, Pred 5  - PPx: bactrim/valcyte (currently on 450mg QD decreased from 900 QD 2/2 low WBC)  - Daily CBC, BMP  - Continue Lasix 20mg IV daily for peripheral edema      [] DM  - Glucose controlled  - Fingersticks ac and qhs  - Continue glargine 15u qhs and  lispro 10u with meals  - SSI coverage

## 2020-11-23 NOTE — PHYSICAL THERAPY INITIAL EVALUATION ADULT - PLANNED THERAPY INTERVENTIONS, PT EVAL
balance training/transfer training/stair training: 3 stairs with rail independent, 2 wks/gait training

## 2020-11-24 ENCOUNTER — RESULT REVIEW (OUTPATIENT)
Age: 65
End: 2020-11-24

## 2020-11-24 ENCOUNTER — APPOINTMENT (OUTPATIENT)
Dept: TRANSPLANT | Facility: CLINIC | Age: 65
End: 2020-11-24

## 2020-11-24 ENCOUNTER — TRANSCRIPTION ENCOUNTER (OUTPATIENT)
Age: 65
End: 2020-11-24

## 2020-11-24 LAB
APTT BLD: 30.1 SEC — SIGNIFICANT CHANGE UP (ref 27.5–35.5)
BLD GP AB SCN SERPL QL: NEGATIVE — SIGNIFICANT CHANGE UP
EVEROLIMUS, WHOLE BLOOD RESULT: 4.3 NG/ML — SIGNIFICANT CHANGE UP (ref 3–?)
EVEROLIMUS, WHOLE BLOOD RESULT: 5.3 NG/ML — SIGNIFICANT CHANGE UP (ref 3–?)
HCT VFR BLD CALC: 27.7 % — LOW (ref 39–50)
HGB BLD-MCNC: 8.9 G/DL — LOW (ref 13–17)
INR BLD: 0.97 RATIO — SIGNIFICANT CHANGE UP (ref 0.88–1.16)
MAGNESIUM SERPL-MCNC: 2 MG/DL — SIGNIFICANT CHANGE UP (ref 1.6–2.6)
MCHC RBC-ENTMCNC: 29 PG — SIGNIFICANT CHANGE UP (ref 27–34)
MCHC RBC-ENTMCNC: 32.1 GM/DL — SIGNIFICANT CHANGE UP (ref 32–36)
MCV RBC AUTO: 90.2 FL — SIGNIFICANT CHANGE UP (ref 80–100)
NRBC # BLD: 0 /100 WBCS — SIGNIFICANT CHANGE UP (ref 0–0)
PHOSPHATE SERPL-MCNC: 3.2 MG/DL — SIGNIFICANT CHANGE UP (ref 2.5–4.5)
PLATELET # BLD AUTO: 160 K/UL — SIGNIFICANT CHANGE UP (ref 150–400)
PROTHROM AB SERPL-ACNC: 11.6 SEC — SIGNIFICANT CHANGE UP (ref 10.6–13.6)
RBC # BLD: 3.07 M/UL — LOW (ref 4.2–5.8)
RBC # FLD: 13.1 % — SIGNIFICANT CHANGE UP (ref 10.3–14.5)
RH IG SCN BLD-IMP: POSITIVE — SIGNIFICANT CHANGE UP
WBC # BLD: 2.16 K/UL — LOW (ref 3.8–10.5)
WBC # FLD AUTO: 2.16 K/UL — LOW (ref 3.8–10.5)

## 2020-11-24 PROCEDURE — 99232 SBSQ HOSP IP/OBS MODERATE 35: CPT | Mod: GC,24

## 2020-11-24 PROCEDURE — 78103 BONE MARROW IMAGING MULT: CPT | Mod: 26

## 2020-11-24 PROCEDURE — 88304 TISSUE EXAM BY PATHOLOGIST: CPT | Mod: 26

## 2020-11-24 PROCEDURE — 99232 SBSQ HOSP IP/OBS MODERATE 35: CPT

## 2020-11-24 PROCEDURE — 78830 RP LOCLZJ TUM SPECT W/CT 1: CPT | Mod: 26

## 2020-11-24 RX ORDER — PANTOPRAZOLE SODIUM 20 MG/1
40 TABLET, DELAYED RELEASE ORAL
Refills: 0 | Status: DISCONTINUED | OUTPATIENT
Start: 2020-11-24 | End: 2020-12-02

## 2020-11-24 RX ORDER — ACETAMINOPHEN 500 MG
650 TABLET ORAL EVERY 6 HOURS
Refills: 0 | Status: DISCONTINUED | OUTPATIENT
Start: 2020-11-24 | End: 2020-12-02

## 2020-11-24 RX ORDER — INSULIN LISPRO 100/ML
10 VIAL (ML) SUBCUTANEOUS
Refills: 0 | Status: DISCONTINUED | OUTPATIENT
Start: 2020-11-24 | End: 2020-12-01

## 2020-11-24 RX ORDER — DEXTROSE 50 % IN WATER 50 %
12.5 SYRINGE (ML) INTRAVENOUS ONCE
Refills: 0 | Status: DISCONTINUED | OUTPATIENT
Start: 2020-11-24 | End: 2020-12-02

## 2020-11-24 RX ORDER — INSULIN LISPRO 100/ML
VIAL (ML) SUBCUTANEOUS EVERY 6 HOURS
Refills: 0 | Status: DISCONTINUED | OUTPATIENT
Start: 2020-11-24 | End: 2020-11-25

## 2020-11-24 RX ORDER — FUROSEMIDE 40 MG
20 TABLET ORAL DAILY
Refills: 0 | Status: DISCONTINUED | OUTPATIENT
Start: 2020-11-24 | End: 2020-11-29

## 2020-11-24 RX ORDER — INSULIN LISPRO 100/ML
VIAL (ML) SUBCUTANEOUS EVERY 6 HOURS
Refills: 0 | Status: DISCONTINUED | OUTPATIENT
Start: 2020-11-23 | End: 2020-11-24

## 2020-11-24 RX ORDER — SODIUM CHLORIDE 9 MG/ML
1000 INJECTION, SOLUTION INTRAVENOUS
Refills: 0 | Status: DISCONTINUED | OUTPATIENT
Start: 2020-11-24 | End: 2020-12-02

## 2020-11-24 RX ORDER — GLUCAGON INJECTION, SOLUTION 0.5 MG/.1ML
1 INJECTION, SOLUTION SUBCUTANEOUS ONCE
Refills: 0 | Status: DISCONTINUED | OUTPATIENT
Start: 2020-11-24 | End: 2020-12-02

## 2020-11-24 RX ORDER — DEXTROSE 50 % IN WATER 50 %
25 SYRINGE (ML) INTRAVENOUS ONCE
Refills: 0 | Status: DISCONTINUED | OUTPATIENT
Start: 2020-11-24 | End: 2020-12-02

## 2020-11-24 RX ORDER — SODIUM CHLORIDE 9 MG/ML
1000 INJECTION, SOLUTION INTRAVENOUS
Refills: 0 | Status: DISCONTINUED | OUTPATIENT
Start: 2020-11-24 | End: 2020-11-24

## 2020-11-24 RX ORDER — MAGNESIUM OXIDE 400 MG ORAL TABLET 241.3 MG
800 TABLET ORAL
Refills: 0 | Status: DISCONTINUED | OUTPATIENT
Start: 2020-11-24 | End: 2020-12-02

## 2020-11-24 RX ORDER — ASPIRIN/CALCIUM CARB/MAGNESIUM 324 MG
81 TABLET ORAL DAILY
Refills: 0 | Status: DISCONTINUED | OUTPATIENT
Start: 2020-11-24 | End: 2020-12-02

## 2020-11-24 RX ORDER — COLLAGENASE CLOSTRIDIUM HIST. 250 UNIT/G
1 OINTMENT (GRAM) TOPICAL DAILY
Refills: 0 | Status: DISCONTINUED | OUTPATIENT
Start: 2020-11-24 | End: 2020-12-02

## 2020-11-24 RX ORDER — ONDANSETRON 8 MG/1
4 TABLET, FILM COATED ORAL ONCE
Refills: 0 | Status: DISCONTINUED | OUTPATIENT
Start: 2020-11-24 | End: 2020-11-24

## 2020-11-24 RX ORDER — ACETAMINOPHEN 500 MG
1000 TABLET ORAL ONCE
Refills: 0 | Status: DISCONTINUED | OUTPATIENT
Start: 2020-11-24 | End: 2020-11-24

## 2020-11-24 RX ORDER — DEXTROSE 50 % IN WATER 50 %
15 SYRINGE (ML) INTRAVENOUS ONCE
Refills: 0 | Status: DISCONTINUED | OUTPATIENT
Start: 2020-11-24 | End: 2020-12-02

## 2020-11-24 RX ORDER — EVEROLIMUS 10 MG/1
2 TABLET ORAL
Refills: 0 | Status: DISCONTINUED | OUTPATIENT
Start: 2020-11-24 | End: 2020-12-02

## 2020-11-24 RX ORDER — VANCOMYCIN HCL 1 G
1250 VIAL (EA) INTRAVENOUS DAILY
Refills: 0 | Status: DISCONTINUED | OUTPATIENT
Start: 2020-11-24 | End: 2020-11-25

## 2020-11-24 RX ORDER — TAMSULOSIN HYDROCHLORIDE 0.4 MG/1
0.4 CAPSULE ORAL AT BEDTIME
Refills: 0 | Status: DISCONTINUED | OUTPATIENT
Start: 2020-11-24 | End: 2020-12-02

## 2020-11-24 RX ORDER — ACETAMINOPHEN 500 MG
650 TABLET ORAL EVERY 6 HOURS
Refills: 0 | Status: DISCONTINUED | OUTPATIENT
Start: 2020-11-24 | End: 2020-11-25

## 2020-11-24 RX ORDER — VALGANCICLOVIR 450 MG/1
450 TABLET, FILM COATED ORAL DAILY
Refills: 0 | Status: DISCONTINUED | OUTPATIENT
Start: 2020-11-24 | End: 2020-12-02

## 2020-11-24 RX ORDER — CEFEPIME 1 G/1
2000 INJECTION, POWDER, FOR SOLUTION INTRAMUSCULAR; INTRAVENOUS EVERY 12 HOURS
Refills: 0 | Status: DISCONTINUED | OUTPATIENT
Start: 2020-11-24 | End: 2020-12-01

## 2020-11-24 RX ORDER — INSULIN GLARGINE 100 [IU]/ML
8 INJECTION, SOLUTION SUBCUTANEOUS AT BEDTIME
Refills: 0 | Status: DISCONTINUED | OUTPATIENT
Start: 2020-11-24 | End: 2020-12-01

## 2020-11-24 RX ADMIN — EVEROLIMUS 2 MILLIGRAM(S): 10 TABLET ORAL at 12:36

## 2020-11-24 RX ADMIN — Medication 2: at 13:27

## 2020-11-24 RX ADMIN — TAMSULOSIN HYDROCHLORIDE 0.4 MILLIGRAM(S): 0.4 CAPSULE ORAL at 22:27

## 2020-11-24 RX ADMIN — Medication 20 MILLIGRAM(S): at 05:11

## 2020-11-24 RX ADMIN — INSULIN GLARGINE 8 UNIT(S): 100 INJECTION, SOLUTION SUBCUTANEOUS at 22:26

## 2020-11-24 RX ADMIN — Medication 166.67 MILLIGRAM(S): at 13:30

## 2020-11-24 RX ADMIN — MAGNESIUM OXIDE 400 MG ORAL TABLET 800 MILLIGRAM(S): 241.3 TABLET ORAL at 12:34

## 2020-11-24 RX ADMIN — Medication 4: at 00:11

## 2020-11-24 RX ADMIN — Medication 5 MILLIGRAM(S): at 05:12

## 2020-11-24 RX ADMIN — Medication 81 MILLIGRAM(S): at 12:36

## 2020-11-24 RX ADMIN — Medication 1 APPLICATION(S): at 08:29

## 2020-11-24 RX ADMIN — EVEROLIMUS 2 MILLIGRAM(S): 10 TABLET ORAL at 20:10

## 2020-11-24 RX ADMIN — Medication 650 MILLIGRAM(S): at 05:42

## 2020-11-24 RX ADMIN — VALGANCICLOVIR 450 MILLIGRAM(S): 450 TABLET, FILM COATED ORAL at 12:37

## 2020-11-24 RX ADMIN — Medication 10 UNIT(S): at 20:10

## 2020-11-24 RX ADMIN — Medication 650 MILLIGRAM(S): at 05:12

## 2020-11-24 RX ADMIN — CEFEPIME 100 MILLIGRAM(S): 1 INJECTION, POWDER, FOR SOLUTION INTRAMUSCULAR; INTRAVENOUS at 20:10

## 2020-11-24 RX ADMIN — CEFEPIME 100 MILLIGRAM(S): 1 INJECTION, POWDER, FOR SOLUTION INTRAMUSCULAR; INTRAVENOUS at 05:55

## 2020-11-24 RX ADMIN — Medication 1 TABLET(S): at 12:37

## 2020-11-24 NOTE — DISCHARGE NOTE PROVIDER - NSDCFUADDAPPT_GEN_ALL_CORE_FT
FOLLOW-UP:  1. Please call to make a follow-up appointment with   ( date  )  2. Please follow up with your primary care physician in one week regarding your hospitalization.   FOLLOW-UP:  1.  Please follow-up with Dr. Chris on----  2.  [facility] 1 week of discharge from the hospital, please call [phone] for appointment and discuss that you recently were seen in the hospital.  3.  Please follow-up with Dr. Pérez, Infectious Disease in 4 weeks: [phone]   FOLLOW-UP:  1.  Please follow-up with Dr. Chris on----  2.  Please follow up with Dr. Wang [facility] within 1 week of discharge from the hospital, please call [phone] for appointment and discuss that you recently were seen in the hospital.  3.  Please follow-up with Dr. Pérez, Infectious Disease in 4 weeks: [phone]  4.  Please follow-up with your Endocrinologist as scheduled in January 2021   FOLLOW-UP:  1.  Please follow-up with Dr. Chris early next week  2.  Please follow up with Dr. [facility] within 1 week of discharge from the hospital, please call [phone] for appointment and discuss that you recently were seen in the hospital.  3.  Please follow-up with Dr. Pérez, Infectious Disease in 4 weeks: [phone]  4.  Please follow-up with your Endocrinologist as scheduled in January 2021

## 2020-11-24 NOTE — DISCHARGE NOTE PROVIDER - CARE PROVIDERS DIRECT ADDRESSES
,[email or web address]

## 2020-11-24 NOTE — PROGRESS NOTE ADULT - SUBJECTIVE AND OBJECTIVE BOX
Patient is a 64y old  Male who presents with a chief complaint of R heel wound (23 Nov 2020 16:39)      INTERVAL HPI/OVERNIGHT EVENTS:   Pt is scheduled for Right foot partial calcanectomy with graft application with Dr. Wang at 3:30PM. Patient is aware of procedure and is NPO since midnight.    MEDICATIONS  (STANDING):  aspirin enteric coated 81 milliGRAM(s) Oral daily  cefepime   IVPB 2000 milliGRAM(s) IV Intermittent every 12 hours  collagenase Ointment 1 Application(s) Topical daily  Dakins Solution - 1/2 Strength 1 Application(s) Topical daily  dextrose 40% Gel 15 Gram(s) Oral once  dextrose 5%. 1000 milliLiter(s) (50 mL/Hr) IV Continuous <Continuous>  dextrose 5%. 1000 milliLiter(s) (100 mL/Hr) IV Continuous <Continuous>  dextrose 5%. 1000 milliLiter(s) (100 mL/Hr) IV Continuous <Continuous>  dextrose 50% Injectable 25 Gram(s) IV Push once  dextrose 50% Injectable 12.5 Gram(s) IV Push once  dextrose 50% Injectable 25 Gram(s) IV Push once  everolimus (ZORTRESS) 2 milliGRAM(s) Oral <User Schedule>  furosemide   Injectable 20 milliGRAM(s) IV Push daily  glucagon  Injectable 1 milliGRAM(s) IntraMuscular once  insulin glargine Injectable (LANTUS) 8 Unit(s) SubCutaneous at bedtime  insulin lispro (ADMELOG) corrective regimen sliding scale   SubCutaneous every 6 hours  insulin lispro Injectable (ADMELOG) 10 Unit(s) SubCutaneous three times a day before meals  magnesium oxide 800 milliGRAM(s) Oral with breakfast  pantoprazole    Tablet 40 milliGRAM(s) Oral before breakfast  predniSONE   Tablet 5 milliGRAM(s) Oral daily  tamsulosin 0.4 milliGRAM(s) Oral at bedtime  trimethoprim   80 mG/sulfamethoxazole 400 mG 1 Tablet(s) Oral daily  valGANciclovir 450 milliGRAM(s) Oral daily  vancomycin  IVPB 1250 milliGRAM(s) IV Intermittent daily    MEDICATIONS  (PRN):  acetaminophen   Tablet .. 650 milliGRAM(s) Oral every 6 hours PRN Temp greater or equal to 38C (100.4F), Mild Pain (1 - 3)      Allergies    codeine (Anaphylaxis)    Intolerances        Vital Signs Last 24 Hrs  T(C): 37 (24 Nov 2020 06:31), Max: 37 (24 Nov 2020 01:00)  T(F): 98.6 (24 Nov 2020 06:31), Max: 98.6 (24 Nov 2020 01:00)  HR: 75 (24 Nov 2020 06:31) (75 - 91)  BP: 119/63 (24 Nov 2020 06:31) (119/63 - 150/68)  BP(mean): --  RR: 18 (24 Nov 2020 06:31) (18 - 18)  SpO2: 98% (24 Nov 2020 06:31) (97% - 100%)    LABS:                        8.9    2.16  )-----------( 160      ( 24 Nov 2020 06:15 )             27.7     11-23    140  |  106  |  25<H>  ----------------------------<  145<H>  4.4   |  25  |  1.44<H>    Ca    8.9      23 Nov 2020 05:58  Phos  3.2     11-24  Mg     2.0     11-24    TPro  5.8<L>  /  Alb  3.2<L>  /  TBili  0.1<L>  /  DBili  x   /  AST  15  /  ALT  15  /  AlkPhos  90  11-23    PT/INR - ( 24 Nov 2020 06:15 )   PT: 11.6 sec;   INR: 0.97 ratio         PTT - ( 24 Nov 2020 06:15 )  PTT:30.1 sec    CAPILLARY BLOOD GLUCOSE      POCT Blood Glucose.: 128 mg/dL (24 Nov 2020 05:55)  POCT Blood Glucose.: 229 mg/dL (23 Nov 2020 23:35)  POCT Blood Glucose.: 195 mg/dL (23 Nov 2020 21:17)  POCT Blood Glucose.: 200 mg/dL (23 Nov 2020 18:30)  POCT Blood Glucose.: 179 mg/dL (23 Nov 2020 12:00)      RADIOLOGY & ADDITIONAL TESTS:    Plan:   Pt is scheduled for Right foot partial calcanectomy with graft application with Dr. Wang at 3:30PM. Patient is aware of procedure and is NPO since midnight.   CXR on sunrise.  EKG on sunrise.  Medical/Cardiac clearance to be documented in chart as discussed with primary transplant team  Consent signed and in chart.  Procedure was explained to patient in detail. All alternatives, risks and complications were discussed. All questions answered. Patient is a 64y old  Male who presents with a chief complaint of R heel wound (23 Nov 2020 16:39)      INTERVAL HPI/OVERNIGHT EVENTS:   Pt is scheduled for Right foot partial calcanectomy with graft application with Dr. Wang at 3:30PM. Patient is aware of procedure and is NPO since midnight.    MEDICATIONS  (STANDING):  aspirin enteric coated 81 milliGRAM(s) Oral daily  cefepime   IVPB 2000 milliGRAM(s) IV Intermittent every 12 hours  collagenase Ointment 1 Application(s) Topical daily  Dakins Solution - 1/2 Strength 1 Application(s) Topical daily  dextrose 40% Gel 15 Gram(s) Oral once  dextrose 5%. 1000 milliLiter(s) (50 mL/Hr) IV Continuous <Continuous>  dextrose 5%. 1000 milliLiter(s) (100 mL/Hr) IV Continuous <Continuous>  dextrose 5%. 1000 milliLiter(s) (100 mL/Hr) IV Continuous <Continuous>  dextrose 50% Injectable 25 Gram(s) IV Push once  dextrose 50% Injectable 12.5 Gram(s) IV Push once  dextrose 50% Injectable 25 Gram(s) IV Push once  everolimus (ZORTRESS) 2 milliGRAM(s) Oral <User Schedule>  furosemide   Injectable 20 milliGRAM(s) IV Push daily  glucagon  Injectable 1 milliGRAM(s) IntraMuscular once  insulin glargine Injectable (LANTUS) 8 Unit(s) SubCutaneous at bedtime  insulin lispro (ADMELOG) corrective regimen sliding scale   SubCutaneous every 6 hours  insulin lispro Injectable (ADMELOG) 10 Unit(s) SubCutaneous three times a day before meals  magnesium oxide 800 milliGRAM(s) Oral with breakfast  pantoprazole    Tablet 40 milliGRAM(s) Oral before breakfast  predniSONE   Tablet 5 milliGRAM(s) Oral daily  tamsulosin 0.4 milliGRAM(s) Oral at bedtime  trimethoprim   80 mG/sulfamethoxazole 400 mG 1 Tablet(s) Oral daily  valGANciclovir 450 milliGRAM(s) Oral daily  vancomycin  IVPB 1250 milliGRAM(s) IV Intermittent daily    MEDICATIONS  (PRN):  acetaminophen   Tablet .. 650 milliGRAM(s) Oral every 6 hours PRN Temp greater or equal to 38C (100.4F), Mild Pain (1 - 3)      Allergies    codeine (Anaphylaxis)    Intolerances        Vital Signs Last 24 Hrs  T(C): 37 (24 Nov 2020 06:31), Max: 37 (24 Nov 2020 01:00)  T(F): 98.6 (24 Nov 2020 06:31), Max: 98.6 (24 Nov 2020 01:00)  HR: 75 (24 Nov 2020 06:31) (75 - 91)  BP: 119/63 (24 Nov 2020 06:31) (119/63 - 150/68)  BP(mean): --  RR: 18 (24 Nov 2020 06:31) (18 - 18)  SpO2: 98% (24 Nov 2020 06:31) (97% - 100%)    LABS:                        8.9    2.16  )-----------( 160      ( 24 Nov 2020 06:15 )             27.7     11-23    140  |  106  |  25<H>  ----------------------------<  145<H>  4.4   |  25  |  1.44<H>    Ca    8.9      23 Nov 2020 05:58  Phos  3.2     11-24  Mg     2.0     11-24    TPro  5.8<L>  /  Alb  3.2<L>  /  TBili  0.1<L>  /  DBili  x   /  AST  15  /  ALT  15  /  AlkPhos  90  11-23    PT/INR - ( 24 Nov 2020 06:15 )   PT: 11.6 sec;   INR: 0.97 ratio         PTT - ( 24 Nov 2020 06:15 )  PTT:30.1 sec    CAPILLARY BLOOD GLUCOSE      POCT Blood Glucose.: 128 mg/dL (24 Nov 2020 05:55)  POCT Blood Glucose.: 229 mg/dL (23 Nov 2020 23:35)  POCT Blood Glucose.: 195 mg/dL (23 Nov 2020 21:17)  POCT Blood Glucose.: 200 mg/dL (23 Nov 2020 18:30)  POCT Blood Glucose.: 179 mg/dL (23 Nov 2020 12:00)      RADIOLOGY & ADDITIONAL TESTS:    Plan:   Pt is scheduled for Right foot partial calcanectomy with graft application with Dr. Wang at 3:30PM. Patient is aware of procedure and is NPO since midnight.   CXR on sunrise.  EKG on sunrise.  Medical/Cardiac clearance documented in chart since 11/23  Consent signed and in chart.  Procedure was explained to patient in detail. All alternatives, risks and complications were discussed. All questions answered.

## 2020-11-24 NOTE — BRIEF OPERATIVE NOTE - OPERATION/FINDINGS
s/p right foot heel incision and drainage w/ application of stravix and bone biopsy  - adequate bleeding s/p right foot heel incision and drainage w/ application of stravix and bone biopsy  - adequate bleeding  - bone felt very hard

## 2020-11-24 NOTE — BRIEF OPERATIVE NOTE - COMMENTS
s/p right foot heel incision and drainage w/ application of stravix and bone biopsy  - adequate bleeding   - low concern bone infection, low concern for soft tissue infection  - low concern for viability s/p right foot heel incision and drainage w/ application of stravix and bone biopsy  - adequate bleeding   - low concern bone infection, low concern for soft tissue infection  - low concern for viability  - stable for d/c from podiatry standpoint  - PO Abx per final infectious disease Recs

## 2020-11-24 NOTE — DISCHARGE NOTE PROVIDER - NSDCMRMEDTOKEN_GEN_ALL_CORE_FT
aspirin 81 mg oral delayed release tablet: 1 tab(s) orally once a day  collagenase 250 units/g topical ointment: 1 application topically once a day  diphenhydrAMINE 25 mg oral capsule: 1 cap(s) orally , As needed, Insomnia  everolimus 0.5 mg oral tablet: 2 tab(s) orally once a day  everolimus 0.75 mg oral tablet: 6 tab(s) orally   HumaLOG KwikPen 100 units/mL injectable solution: 6 unit(s) injectable 3 times a day (before meals)  insulin glargine: 6 unit(s) subcutaneous once a day (at bedtime)  Lasix 40 mg oral tablet: 1 tab(s) orally once a day  magnesium oxide 400 mg (241.3 mg elemental magnesium) oral tablet: 2 tab(s) orally 2 times a day (with meals)  mycophenolate mofetil 250 mg oral capsule: 2 cap(s) orally 2 times a day  nystatin 100,000 units/g topical cream: 1 application topically 2 times a day  nystatin 100,000 units/mL oral suspension: 5 milliliter(s) orally 4 times a day  pantoprazole 40 mg oral delayed release tablet: 1 tab(s) orally once a day (before a meal)  predniSONE 5 mg oral tablet: 1 tab(s) orally once a day  sulfamethoxazole-trimethoprim 400 mg-80 mg oral tablet: 1 tab(s) orally once a day  tamsulosin 0.4 mg oral capsule: 2 cap(s) orally once a day (at bedtime)  valGANciclovir 450 mg oral tablet: 2 tab(s) orally once a day   aspirin 81 mg oral delayed release tablet: 1 tab(s) orally once a day  cefepime 2 g intravenous injection: 2 gram(s) intravenous every 8 hours  collagenase 250 units/g topical ointment: 1 application topically once a day  diphenhydrAMINE 25 mg oral capsule: 1 cap(s) orally , As needed, Insomnia  everolimus 0.5 mg oral tablet: 2 tab(s) orally once a day  everolimus 0.75 mg oral tablet: 6 tab(s) orally   HumaLOG KwikPen 100 units/mL injectable solution: 6 unit(s) injectable 3 times a day (before meals)  insulin glargine: 6 unit(s) subcutaneous once a day (at bedtime)  Lasix 40 mg oral tablet: 1 tab(s) orally once a day  magnesium oxide 400 mg (241.3 mg elemental magnesium) oral tablet: 2 tab(s) orally 2 times a day (with meals)  mycophenolate mofetil 250 mg oral capsule: 2 cap(s) orally 2 times a day  nystatin 100,000 units/g topical cream: 1 application topically 2 times a day  nystatin 100,000 units/mL oral suspension: 5 milliliter(s) orally 4 times a day  pantoprazole 40 mg oral delayed release tablet: 1 tab(s) orally once a day (before a meal)  predniSONE 5 mg oral tablet: 1 tab(s) orally once a day  sulfamethoxazole-trimethoprim 400 mg-80 mg oral tablet: 1 tab(s) orally once a day  tamsulosin 0.4 mg oral capsule: 2 cap(s) orally once a day (at bedtime)  valGANciclovir 450 mg oral tablet: 2 tab(s) orally once a day   aspirin 81 mg oral delayed release tablet: 1 tab(s) orally once a day  cefepime 2 g intravenous injection: 2 gram(s) intravenous every 8 hours  everolimus 1 mg oral tablet: 2 tab(s) orally   furosemide 20 mg oral tablet: 1 tab(s) orally once a day  HumaLOG KwikPen 100 units/mL injectable solution: 10 unit(s) injectable 3 times a day (before meals)  insulin glargine: 10 unit(s) subcutaneous once a day (at bedtime)  magnesium oxide 400 mg (241.3 mg elemental magnesium) oral tablet: 2 tab(s) orally once a day (before a meal)  nystatin 100,000 units/mL oral suspension: 5 milliliter(s) orally 4 times a day  pantoprazole 40 mg oral delayed release tablet: 1 tab(s) orally once a day (before a meal)  predniSONE 5 mg oral tablet: 1 tab(s) orally once a day  sulfamethoxazole-trimethoprim 400 mg-80 mg oral tablet: 1 tab(s) orally once a day  tamsulosin 0.4 mg oral capsule: 1 cap(s) orally once a day (at bedtime)  valGANciclovir 450 mg oral tablet: 1 tab(s) orally once a day

## 2020-11-24 NOTE — PRE-ANESTHESIA EVALUATION ADULT - NSANTHOSAYNRD_GEN_A_CORE
No. SHENG screening performed.  STOP BANG Legend: 0-2 = LOW Risk; 3-4 = INTERMEDIATE Risk; 5-8 = HIGH Risk

## 2020-11-24 NOTE — PRE-ANESTHESIA EVALUATION ADULT - NSANTHPMHFT_GEN_ALL_CORE
64M with IDDM, HLD, obesity, HFpEF with mild LV diastolic dysfunction, MGUS, chronic anemia with a history of duodenal ulcer 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  Known history of non healing R heel ulcer.

## 2020-11-24 NOTE — PROGRESS NOTE ADULT - ASSESSMENT
64M with IDDM, HLD, obesity, HFpEF with mild LV diastolic dysfunction, MGUS, chronic anemia with a history of duodenal ulcer 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  Known history of non healing R heel ulcer. Followed by podiatry outpatient Dr. Patel, had recent debridement wound (10/20) cx grew ec faecalis, pseudomonas, enterococcus. Tx with Cipro without improvement now admitted for IV antibiotics and further intervention from podiatry    [] R heel ulcer/Osteomyelitis  - MRI c/w early OM  - 11/21 Wound culture growing few pseudomonas.  Continue Cefepime and add Vanco for gram positive coverage pending sensitivities  - Tentative OR tomorrow 11/24 with podiatry for possible right foot calcanectomy with graft application.    - Medical clearance done for procedure under sedation with local  - WBC scan second part done today podiatry will f/ results  - MMF held. Everolimus dose reduced.   - Physical therapy eval    [] s/p OLT with good graft function   - Immuno: Everolimus 2/2, MMF on hold in setting of infection, Pred 5  - PPx: bactrim/valcyte (currently on 450mg QD decreased from 900 QD 2/2 low WBC)  - Daily CBC, BMP  - Continue Lasix 20mg IV daily for peripheral edema      [] DM  - Glucose controlled  - Fingersticks ac and qhs  - Continue glargine 15u qhs and  lispro 10u with meals  - SSI coverage

## 2020-11-24 NOTE — DISCHARGE NOTE PROVIDER - NSDCHHNEEDSERVICE_GEN_ALL_CORE
Central venous access care/Medication teaching and assessment/Teaching and training/Wound care and assessment

## 2020-11-24 NOTE — PROGRESS NOTE ADULT - SUBJECTIVE AND OBJECTIVE BOX
Follow Up:      Interval History/ROS:Patient is a 64y old  Male who presents with a chief complaint of R heel wound (24 Nov 2020 12:17)      Allergies    codeine (Anaphylaxis)    Intolerances        ANTIMICROBIALS:  cefepime   IVPB 2000 every 12 hours  trimethoprim   80 mG/sulfamethoxazole 400 mG 1 daily  valGANciclovir 450 daily  vancomycin  IVPB 1250 daily      OTHER MEDS:  acetaminophen   Tablet .. 650 milliGRAM(s) Oral every 6 hours PRN  aspirin enteric coated 81 milliGRAM(s) Oral daily  collagenase Ointment 1 Application(s) Topical daily  Dakins Solution - 1/2 Strength 1 Application(s) Topical daily  dextrose 40% Gel 15 Gram(s) Oral once  dextrose 5%. 1000 milliLiter(s) IV Continuous <Continuous>  dextrose 5%. 1000 milliLiter(s) IV Continuous <Continuous>  dextrose 5%. 1000 milliLiter(s) IV Continuous <Continuous>  dextrose 50% Injectable 25 Gram(s) IV Push once  dextrose 50% Injectable 12.5 Gram(s) IV Push once  dextrose 50% Injectable 25 Gram(s) IV Push once  everolimus (ZORTRESS) 2 milliGRAM(s) Oral <User Schedule>  furosemide   Injectable 20 milliGRAM(s) IV Push daily  glucagon  Injectable 1 milliGRAM(s) IntraMuscular once  insulin glargine Injectable (LANTUS) 8 Unit(s) SubCutaneous at bedtime  insulin lispro (ADMELOG) corrective regimen sliding scale   SubCutaneous every 6 hours  insulin lispro Injectable (ADMELOG) 10 Unit(s) SubCutaneous three times a day before meals  magnesium oxide 800 milliGRAM(s) Oral with breakfast  pantoprazole    Tablet 40 milliGRAM(s) Oral before breakfast  predniSONE   Tablet 5 milliGRAM(s) Oral daily  tamsulosin 0.4 milliGRAM(s) Oral at bedtime      Vital Signs Last 24 Hrs  T(C): 37 (24 Nov 2020 06:31), Max: 37 (24 Nov 2020 01:00)  T(F): 98.6 (24 Nov 2020 06:31), Max: 98.6 (24 Nov 2020 01:00)  HR: 75 (24 Nov 2020 06:31) (75 - 87)  BP: 119/63 (24 Nov 2020 06:31) (119/63 - 150/68)  BP(mean): --  RR: 18 (24 Nov 2020 06:31) (18 - 18)  SpO2: 98% (24 Nov 2020 06:31) (98% - 100%)    EXAM:  Constitutional: Not in acute distress  Eyes: No icterus. Pupils b/l reactive to light.  Oral cavity: Clear, no lesions  Neck: No neck vein distension noted  RS: Chest clear to auscultation bilaterally. No wheeze/rhonchi/crepitations.  CVS: S1, S2 heard. Regular rate and rhythm. No murmurs/rubs/gallops.  Abdomen: Soft. No guarding/rigidity/tenderness.  : No acute abnormalities  Extremities: Warm. No pedal edema  Skin: No lesions noted  Vascular: No evidence of phlebitis  Neuro: Alert, oriented to time/place/person                        8.9    2.16  )-----------( 160      ( 24 Nov 2020 06:15 )             27.7       11-23    140  |  106  |  25<H>  ----------------------------<  145<H>  4.4   |  25  |  1.44<H>    Ca    8.9      23 Nov 2020 05:58  Phos  3.2     11-24  Mg     2.0     11-24    TPro  5.8<L>  /  Alb  3.2<L>  /  TBili  0.1<L>  /  DBili  x   /  AST  15  /  ALT  15  /  AlkPhos  90  11-23      MICROBIOLOGY:    Culture Results:   Few Pseudomonas aeruginosa  Normal skin ninfa isolated (11-21 @ 01:03)  Culture Results:   No growth to date. (11-20 @ 23:02)  Culture Results:   No growth to date. (11-20 @ 23:02)      RADIOLOGY:    < from: NM SPECT/CT Inflammatory Loc, Single Area Single Day (11.24.20 @ 11:02) >  FINDINGS:  There is congruent uptake of radiolabeled leukocytes and Tc-99m sulfur colloid in the right calcaneus.  No abnormal activity is identified in the right heel region to suggest the presence of osteomyelitis.    There is also increased Tc 99m sulfur colloid activity in the midfoot, and metatarsal joints raising the possibility of neuropathic joint.    IMPRESSION: Combined Indium-111labeled leukocyte study and marrow scan demonstrates:    No scan evidence of osteomyelitis in the right heel.    Findings raise the possibility of neuropathic joint in the right midfoot.    < end of copied text >    < from: Xray Chest PA/Lat Pre Surgical Testing 2 Views (11.23.20 @ 19:30) >  EXAM:  XR CHEST PA LAT PST 2V                            PROCEDURE DATE:  11/23/2020        INTERPRETATION:  PA and lateral projection of the chest was obtained on November 23, 2020.    Indication:    Impression:    The heart is normal in size. The lungs are clear. No pleural effusion. No pneumothorax. Degenerative changes of the thoracic spine.    < end of copied text >    < from: MR Foot No Cont, Right (11.21.20 @ 12:58) >    FINDINGS:    The study is markedly limited as there are only 3 sequences which are motion degraded.    There appears to be soft tissue ulceration of the plantar aspect of the calcaneus with bone marrow edema within thesubcortical bone of the lateral aspect of the posterior calcaneus without definitive low T1 signal. This may represent early osteomyelitis. There is marked bone marrow edema at the plantar aspect of the talus laterally with associated low T1 signal. This appears to be degenerative in nature but is difficult to evaluate given the lack of multiplanar imaging.    Mild midfoot arthrosis is present at the TMT joints.    There is marked dorsal subcutaneous soft tissue edema throughout the midfoot and forefoot. Circumferential subcutaneous soft tissue edema seen at the ankle.    IMPRESSION: Markedly limited study.    Possible early osteomyelitis at the plantar and lateral aspect of the calcaneus with adjacent soft tissue ulceration.    Repeat study is recommended when the patient is able to complete entire study.      < end of copied text >     Follow Up:      Interval History/ROS:Patient is a 64y old  Male who presents with a chief complaint of R heel wound (24 Nov 2020 12:17)      Allergies    codeine (Anaphylaxis)    Intolerances        ANTIMICROBIALS:  cefepime   IVPB 2000 every 12 hours  trimethoprim   80 mG/sulfamethoxazole 400 mG 1 daily  valGANciclovir 450 daily  vancomycin  IVPB 1250 daily      OTHER MEDS:  acetaminophen   Tablet .. 650 milliGRAM(s) Oral every 6 hours PRN  aspirin enteric coated 81 milliGRAM(s) Oral daily  collagenase Ointment 1 Application(s) Topical daily  Dakins Solution - 1/2 Strength 1 Application(s) Topical daily  dextrose 40% Gel 15 Gram(s) Oral once  dextrose 5%. 1000 milliLiter(s) IV Continuous <Continuous>  dextrose 5%. 1000 milliLiter(s) IV Continuous <Continuous>  dextrose 5%. 1000 milliLiter(s) IV Continuous <Continuous>  dextrose 50% Injectable 25 Gram(s) IV Push once  dextrose 50% Injectable 12.5 Gram(s) IV Push once  dextrose 50% Injectable 25 Gram(s) IV Push once  everolimus (ZORTRESS) 2 milliGRAM(s) Oral <User Schedule>  furosemide   Injectable 20 milliGRAM(s) IV Push daily  glucagon  Injectable 1 milliGRAM(s) IntraMuscular once  insulin glargine Injectable (LANTUS) 8 Unit(s) SubCutaneous at bedtime  insulin lispro (ADMELOG) corrective regimen sliding scale   SubCutaneous every 6 hours  insulin lispro Injectable (ADMELOG) 10 Unit(s) SubCutaneous three times a day before meals  magnesium oxide 800 milliGRAM(s) Oral with breakfast  pantoprazole    Tablet 40 milliGRAM(s) Oral before breakfast  predniSONE   Tablet 5 milliGRAM(s) Oral daily  tamsulosin 0.4 milliGRAM(s) Oral at bedtime      Vital Signs Last 24 Hrs  T(C): 37 (24 Nov 2020 06:31), Max: 37 (24 Nov 2020 01:00)  T(F): 98.6 (24 Nov 2020 06:31), Max: 98.6 (24 Nov 2020 01:00)  HR: 75 (24 Nov 2020 06:31) (75 - 87)  BP: 119/63 (24 Nov 2020 06:31) (119/63 - 150/68)  BP(mean): --  RR: 18 (24 Nov 2020 06:31) (18 - 18)  SpO2: 98% (24 Nov 2020 06:31) (98% - 100%)    EXAM:  Constitutional: Not in acute distress  Eyes: No icterus.   Oral cavity: Clear, no lesions  Neck: No neck vein distension noted  RS: Chest clear to auscultation bilaterally. No wheeze/rhonchi/crepitations.  CVS: S1, S2 heard. Regular rate and rhythm. No murmurs/rubs/gallops.  Abdomen: Soft. No guarding/rigidity/tenderness.  : No acute abnormalities  Extremities: Warm. No pedal edema  Skin: + sacral wound without purulence or surrounding erythema, + bandaged right foot   Vascular: No evidence of phlebitis  Neuro: Alert, oriented to time/place/person                        8.9    2.16  )-----------( 160      ( 24 Nov 2020 06:15 )             27.7       11-23    140  |  106  |  25<H>  ----------------------------<  145<H>  4.4   |  25  |  1.44<H>    Ca    8.9      23 Nov 2020 05:58  Phos  3.2     11-24  Mg     2.0     11-24    TPro  5.8<L>  /  Alb  3.2<L>  /  TBili  0.1<L>  /  DBili  x   /  AST  15  /  ALT  15  /  AlkPhos  90  11-23      MICROBIOLOGY:    Culture Results:   Few Pseudomonas aeruginosa  Normal skin ninfa isolated (11-21 @ 01:03)  Culture Results:   No growth to date. (11-20 @ 23:02)  Culture Results:   No growth to date. (11-20 @ 23:02)      RADIOLOGY:    < from: NM SPECT/CT Inflammatory Loc, Single Area Single Day (11.24.20 @ 11:02) >  FINDINGS:  There is congruent uptake of radiolabeled leukocytes and Tc-99m sulfur colloid in the right calcaneus.  No abnormal activity is identified in the right heel region to suggest the presence of osteomyelitis.    There is also increased Tc 99m sulfur colloid activity in the midfoot, and metatarsal joints raising the possibility of neuropathic joint.    IMPRESSION: Combined Indium-111labeled leukocyte study and marrow scan demonstrates:    No scan evidence of osteomyelitis in the right heel.    Findings raise the possibility of neuropathic joint in the right midfoot.    < end of copied text >    < from: Xray Chest PA/Lat Pre Surgical Testing 2 Views (11.23.20 @ 19:30) >  EXAM:  XR CHEST PA LAT PST 2V                            PROCEDURE DATE:  11/23/2020        INTERPRETATION:  PA and lateral projection of the chest was obtained on November 23, 2020.    Indication:    Impression:    The heart is normal in size. The lungs are clear. No pleural effusion. No pneumothorax. Degenerative changes of the thoracic spine.    < end of copied text >    < from: MR Foot No Cont, Right (11.21.20 @ 12:58) >    FINDINGS:    The study is markedly limited as there are only 3 sequences which are motion degraded.    There appears to be soft tissue ulceration of the plantar aspect of the calcaneus with bone marrow edema within thesubcortical bone of the lateral aspect of the posterior calcaneus without definitive low T1 signal. This may represent early osteomyelitis. There is marked bone marrow edema at the plantar aspect of the talus laterally with associated low T1 signal. This appears to be degenerative in nature but is difficult to evaluate given the lack of multiplanar imaging.    Mild midfoot arthrosis is present at the TMT joints.    There is marked dorsal subcutaneous soft tissue edema throughout the midfoot and forefoot. Circumferential subcutaneous soft tissue edema seen at the ankle.    IMPRESSION: Markedly limited study.    Possible early osteomyelitis at the plantar and lateral aspect of the calcaneus with adjacent soft tissue ulceration.    Repeat study is recommended when the patient is able to complete entire study.      < end of copied text >     Follow Up:   Patient is a 64y old  Male who presents with a chief complaint of R heel wound (24 Nov 2020 12:17)    Interval History/ROS: afebrile. notes continued right heel pain. notes buttock pain at his decub      Allergies    codeine (Anaphylaxis)    Intolerances        ANTIMICROBIALS:  cefepime   IVPB 2000 every 12 hours  trimethoprim   80 mG/sulfamethoxazole 400 mG 1 daily  valGANciclovir 450 daily  vancomycin  IVPB 1250 daily      OTHER MEDS:  acetaminophen   Tablet .. 650 milliGRAM(s) Oral every 6 hours PRN  aspirin enteric coated 81 milliGRAM(s) Oral daily  collagenase Ointment 1 Application(s) Topical daily  Dakins Solution - 1/2 Strength 1 Application(s) Topical daily  dextrose 40% Gel 15 Gram(s) Oral once  dextrose 5%. 1000 milliLiter(s) IV Continuous <Continuous>  dextrose 5%. 1000 milliLiter(s) IV Continuous <Continuous>  dextrose 5%. 1000 milliLiter(s) IV Continuous <Continuous>  dextrose 50% Injectable 25 Gram(s) IV Push once  dextrose 50% Injectable 12.5 Gram(s) IV Push once  dextrose 50% Injectable 25 Gram(s) IV Push once  everolimus (ZORTRESS) 2 milliGRAM(s) Oral <User Schedule>  furosemide   Injectable 20 milliGRAM(s) IV Push daily  glucagon  Injectable 1 milliGRAM(s) IntraMuscular once  insulin glargine Injectable (LANTUS) 8 Unit(s) SubCutaneous at bedtime  insulin lispro (ADMELOG) corrective regimen sliding scale   SubCutaneous every 6 hours  insulin lispro Injectable (ADMELOG) 10 Unit(s) SubCutaneous three times a day before meals  magnesium oxide 800 milliGRAM(s) Oral with breakfast  pantoprazole    Tablet 40 milliGRAM(s) Oral before breakfast  predniSONE   Tablet 5 milliGRAM(s) Oral daily  tamsulosin 0.4 milliGRAM(s) Oral at bedtime      Vital Signs Last 24 Hrs  T(C): 37 (24 Nov 2020 06:31), Max: 37 (24 Nov 2020 01:00)  T(F): 98.6 (24 Nov 2020 06:31), Max: 98.6 (24 Nov 2020 01:00)  HR: 75 (24 Nov 2020 06:31) (75 - 87)  BP: 119/63 (24 Nov 2020 06:31) (119/63 - 150/68)  BP(mean): --  RR: 18 (24 Nov 2020 06:31) (18 - 18)  SpO2: 98% (24 Nov 2020 06:31) (98% - 100%)    EXAM:  Constitutional: Not in acute distress  Eyes: No icterus.   Oral cavity: Clear, no lesions  Neck: No neck vein distension noted  RS: Chest clear to auscultation bilaterally. No wheeze/rhonchi/crepitations.  CVS: S1, S2 heard. Regular rate and rhythm. No murmurs/rubs/gallops.  Abdomen: Soft. No guarding/rigidity/tenderness.  : No acute abnormalities  Extremities: Warm. No pedal edema  Skin: + sacral wound without purulence or surrounding erythema, + bandaged right foot   Vascular: No evidence of phlebitis  Neuro: Alert, oriented to time/place/person                        8.9    2.16  )-----------( 160      ( 24 Nov 2020 06:15 )             27.7       11-23    140  |  106  |  25<H>  ----------------------------<  145<H>  4.4   |  25  |  1.44<H>    Ca    8.9      23 Nov 2020 05:58  Phos  3.2     11-24  Mg     2.0     11-24    TPro  5.8<L>  /  Alb  3.2<L>  /  TBili  0.1<L>  /  DBili  x   /  AST  15  /  ALT  15  /  AlkPhos  90  11-23      MICROBIOLOGY:    Culture Results:   Few Pseudomonas aeruginosa  Normal skin ninfa isolated (11-21 @ 01:03)  Culture Results:   No growth to date. (11-20 @ 23:02)  Culture Results:   No growth to date. (11-20 @ 23:02)      RADIOLOGY:    <The imaging below has been reviewed and visualized by me independently. Findings as detailed in report below>    < from: NM SPECT/CT Inflammatory Loc, Single Area Single Day (11.24.20 @ 11:02) >  FINDINGS:  There is congruent uptake of radiolabeled leukocytes and Tc-99m sulfur colloid in the right calcaneus.  No abnormal activity is identified in the right heel region to suggest the presence of osteomyelitis.    There is also increased Tc 99m sulfur colloid activity in the midfoot, and metatarsal joints raising the possibility of neuropathic joint.    IMPRESSION: Combined Indium-111labeled leukocyte study and marrow scan demonstrates:    No scan evidence of osteomyelitis in the right heel.    Findings raise the possibility of neuropathic joint in the right midfoot.    < end of copied text >    < from: Xray Chest PA/Lat Pre Surgical Testing 2 Views (11.23.20 @ 19:30) >  EXAM:  XR CHEST PA LAT PST 2V                            PROCEDURE DATE:  11/23/2020        INTERPRETATION:  PA and lateral projection of the chest was obtained on November 23, 2020.    Indication:    Impression:    The heart is normal in size. The lungs are clear. No pleural effusion. No pneumothorax. Degenerative changes of the thoracic spine.    < end of copied text >    < from: MR Foot No Cont, Right (11.21.20 @ 12:58) >    FINDINGS:    The study is markedly limited as there are only 3 sequences which are motion degraded.    There appears to be soft tissue ulceration of the plantar aspect of the calcaneus with bone marrow edema within thesubcortical bone of the lateral aspect of the posterior calcaneus without definitive low T1 signal. This may represent early osteomyelitis. There is marked bone marrow edema at the plantar aspect of the talus laterally with associated low T1 signal. This appears to be degenerative in nature but is difficult to evaluate given the lack of multiplanar imaging.    Mild midfoot arthrosis is present at the TMT joints.    There is marked dorsal subcutaneous soft tissue edema throughout the midfoot and forefoot. Circumferential subcutaneous soft tissue edema seen at the ankle.    IMPRESSION: Markedly limited study.    Possible early osteomyelitis at the plantar and lateral aspect of the calcaneus with adjacent soft tissue ulceration.    Repeat study is recommended when the patient is able to complete entire study.      < end of copied text >

## 2020-11-24 NOTE — DISCHARGE NOTE PROVIDER - NSDCCPCAREPLAN_GEN_ALL_CORE_FT
PRINCIPAL DISCHARGE DIAGNOSIS  Diagnosis: Infected wound  Assessment and Plan of Treatment: You will have PICC line and receive antibiotics for 6 weeks, will need to f/u with transplant ID  Take all antibiotics as ordered.  Call you Health care provider upon arrival home to make a one week follow up appointment.  If you develop fever, chills, malaise, or change in mental status call your Health Care Provider or go to the Emergency Department.  Nutrition is important, eat small frequent meals to help ensure you get adequate calories.  Do not stay in bed all day!  Increase your activity daily as tolerated.      SECONDARY DISCHARGE DIAGNOSES  Diagnosis: Liver transplant recipient  Assessment and Plan of Treatment: Liver replaced by transplant  No heavy lifting anything more than 10-15lbs or straining. Otherwise, you may return to your usual level of physical activity. If you are taking narcotic pain medication (such as Percocet), do NOT drive a car, operate machinery or make important decisions.  Call trnansplant clinic If you developed any of the following, fever, pain, redness, swelling at incision site, cough, nausea, vomiting.  NOTIFY YOUR SURGEON IF: You have any bleeding that does not stop, any pus draining from your wound, any fever (over 100.4 F) or chills, persistent nausea/vomiting with inability to tolerate food or liquids, persistent diarrhea, or if your pain is not controlled on your discharge pain medications.  Immunosuppression:   Keep away from people who have cough, cold, and symptom of flu.  Only take medications that are on your discharge list  If you missed your medications call the transplant office, do not double up medication because you missed a dose.  If you have any question regarding your medication please call transplant office.

## 2020-11-24 NOTE — PROGRESS NOTE ADULT - ASSESSMENT
s/p liver transplant  osteomyelitis     cont regular diet  IV antibiotics per ID  rejection regimen per transplant service  oob as tolerated  podiatry recs noted; OR today for R partial calcanectomy

## 2020-11-24 NOTE — DISCHARGE NOTE PROVIDER - CARE PROVIDER_API CALL
Sergio Chris  SURGERY  [address] [place] [address]  Phone: [phone]  Fax: [phone]  Follow Up Time:    Sergio Chris  SURGERY  [address] [place] [address]  Phone: [phone]  Fax: [phone]  Follow Up Time:     Carroll Wang (DPM)  Podiatric Medicine and Surgery  [address] [place] [address]  Phone: [phone]  Fax: [phone]  Follow Up Time:     Eusebio Pérez  INTERNAL MEDICINE  [address] [place] [address]  Phone: [phone]  Fax: [phone]  Follow Up Time:

## 2020-11-24 NOTE — PROGRESS NOTE ADULT - ATTENDING COMMENTS
64 year old male with past medical history of cirrhosis s/p OLT (7/2/20 at [facility]), IDDM, MGUS who presented to [facility] on 11/20 with worsening drainage at his right heel with concern for osteomyelitis.    Superficial Cultures here with pseudomonas   MRSA Nasal PCR negative  Planned for partial calcanectomy today  Would followup on intraop cultures and if no growth of gram positive would discontinue Vancomycin  Suspect patient will require 6 week course of Cefepime to cover for OM    I will continue to follow. Please feel free to contact me with any further questions.    Eusebio Pérez M.D.  [facility] Division of Infectious Disease  8AM-5PM: Pager Number [phone]  After Hours (or if no response): Please contact the Infectious Diseases Office at [phone]    The above assessment and plan were discussed with transplant surgery team

## 2020-11-24 NOTE — DISCHARGE NOTE PROVIDER - NSDCFUSCHEDAPPT_GEN_ALL_CORE_FT
JESSICA MARTIN ; 11/30/2020 ; [facility] Hep[facility] [address] [facility]  JESSICA MARTIN ; 02/01/2021 ; [facility] Med Endocr [address] [facility] JESSICA MARTIN ; 02/01/2021 ; [facility] Med Endocr [address] [facility]

## 2020-11-24 NOTE — PROGRESS NOTE ADULT - ATTENDING COMMENTS
operative debridement for calcaneous today.   MRI c/w osteo.   Appreciate ID input  Immuno: everolimus (awaiting updated level), pred

## 2020-11-24 NOTE — DISCHARGE NOTE PROVIDER - PROVIDER TOKENS
PROVIDER:[TOKEN:[[id number]]] PROVIDER:[TOKEN:[[id number]]],PROVIDER:[TOKEN:[[id number]]],PROVIDER:[TOKEN:[[id number]]]

## 2020-11-24 NOTE — PROGRESS NOTE ADULT - SUBJECTIVE AND OBJECTIVE BOX
Transplant Surgery - Multidisciplinary Rounds  --------------------------------------------------------------  OLT 7/2/2020    Present:   Patient seen and examined with multidisciplinary team including Transplant Surgeon: Dr. Gray,  Transplant Nephrologist: Dr. Salvador,  Transplant hepatology Dr. Rose, hepatology fellow, Renal Fellow, PGY3 Dr. Laurent, Pharmacist: Ancelmo Blanca, CARLITOSs Patrick/Xavi and unit RN during am rounds.  Disciplines not in attendance will be notified of the plan.     HPI: 64M with IDDM, HLD, obesity, HFpEF with mild LV diastolic dysfunction, MGUS, chronic anemia with a history of duodenal ulcer 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  Known history of non healing R heel ulcer. Followed by podiatry outpatient Dr. Patel, had recent debridement wound (10/20) cx grew ec faecalis, pseudomonas, enterococcus. Tx with cipro.  No improvement post debridement and PO antibiotic treatment.    Admitted with non healing R heel ulcer. MRI c/w early OM. Podiatry and ID consulted. Wound cultured. Broadened to meropenem.   MMF held. Everolimus dose reduced.     Interval Events:   - afebrile, stable VSS  - WBC 2.16 from 2.65, MMF held, Valcyte dose reduced (450mg daily)  - SCr 1.4 (baseline ~1.3), Reduced Lasix 20mg IV daily   - Podiatry following: wound dressing changes with dakins solution.   - 11/21 Wound cultures: pseudomonas, sensitivities pending      On Meropenem  - WBC scanin progress       Potential Discharge date: pending clinical improvement     Education:  Medications    Plan of care:  See Below       MEDICATIONS  (STANDING):  aspirin enteric coated 81 milliGRAM(s) Oral daily  cefepime   IVPB 2000 milliGRAM(s) IV Intermittent every 12 hours  collagenase Ointment 1 Application(s) Topical daily  Dakins Solution - 1/2 Strength 1 Application(s) Topical daily  dextrose 40% Gel 15 Gram(s) Oral once  dextrose 5%. 1000 milliLiter(s) (50 mL/Hr) IV Continuous <Continuous>  dextrose 5%. 1000 milliLiter(s) (100 mL/Hr) IV Continuous <Continuous>  dextrose 5%. 1000 milliLiter(s) (100 mL/Hr) IV Continuous <Continuous>  dextrose 50% Injectable 25 Gram(s) IV Push once  dextrose 50% Injectable 12.5 Gram(s) IV Push once  dextrose 50% Injectable 25 Gram(s) IV Push once  everolimus (ZORTRESS) 2 milliGRAM(s) Oral <User Schedule>  furosemide   Injectable 20 milliGRAM(s) IV Push daily  glucagon  Injectable 1 milliGRAM(s) IntraMuscular once  insulin glargine Injectable (LANTUS) 8 Unit(s) SubCutaneous at bedtime  insulin lispro (ADMELOG) corrective regimen sliding scale   SubCutaneous every 6 hours  insulin lispro Injectable (ADMELOG) 10 Unit(s) SubCutaneous three times a day before meals  magnesium oxide 800 milliGRAM(s) Oral with breakfast  pantoprazole    Tablet 40 milliGRAM(s) Oral before breakfast  predniSONE   Tablet 5 milliGRAM(s) Oral daily  tamsulosin 0.4 milliGRAM(s) Oral at bedtime  trimethoprim   80 mG/sulfamethoxazole 400 mG 1 Tablet(s) Oral daily  valGANciclovir 450 milliGRAM(s) Oral daily  vancomycin  IVPB 1250 milliGRAM(s) IV Intermittent daily    MEDICATIONS  (PRN):  acetaminophen   Tablet .. 650 milliGRAM(s) Oral every 6 hours PRN Temp greater or equal to 38C (100.4F), Mild Pain (1 - 3)      PAST MEDICAL & SURGICAL HISTORY:  GIB (gastrointestinal bleeding)    GERD with esophagitis  Gastritis &amp; Non Bleeding Ulcers    Hepatic encephalopathy    Obesity    Fatty liver disease, nonalcoholic    Renal stones  25 years ago    Hypertension    Neuropathy    Hypercholesteremia    Diabetes    S/P cholecystectomy        Vital Signs Last 24 Hrs  T(C): 37 (24 Nov 2020 06:31), Max: 37 (24 Nov 2020 01:00)  T(F): 98.6 (24 Nov 2020 06:31), Max: 98.6 (24 Nov 2020 01:00)  HR: 75 (24 Nov 2020 06:31) (75 - 87)  BP: 119/63 (24 Nov 2020 06:31) (119/63 - 150/68)  BP(mean): --  RR: 18 (24 Nov 2020 06:31) (18 - 18)  SpO2: 98% (24 Nov 2020 06:31) (98% - 100%)    I&O's Summary    23 Nov 2020 07:01  -  24 Nov 2020 07:00  --------------------------------------------------------  IN: 250 mL / OUT: 3625 mL / NET: -3375 mL                              8.9    2.16  )-----------( 160      ( 24 Nov 2020 06:15 )             27.7     11-23    140  |  106  |  25<H>  ----------------------------<  145<H>  4.4   |  25  |  1.44<H>    Ca    8.9      23 Nov 2020 05:58  Phos  3.2     11-24  Mg     2.0     11-24    TPro  5.8<L>  /  Alb  3.2<L>  /  TBili  0.1<L>  /  DBili  x   /  AST  15  /  ALT  15  /  AlkPhos  90  11-23          Culture - Other (collected 11-21-20 @ 01:03)  Source: Skin wound  Final Report (11-23-20 @ 11:16):    Few Pseudomonas aeruginosa    Normal skin ninfa isolated  Organism: Pseudomonas aeruginosa (11-23-20 @ 11:16)  Organism: Pseudomonas aeruginosa (11-23-20 @ 11:16)    Culture - Blood (collected 11-20-20 @ 23:02)  Source: .Blood Blood-Venous  Preliminary Report (11-22-20 @ 01:02):    No growth to date.    Culture - Blood (collected 11-20-20 @ 23:02)  Source: .Blood Blood-Peripheral  Preliminary Report (11-22-20 @ 01:02):    No growth to date.        Review of systems  Gen: No weight changes, fatigue, fevers/chills, weakness  Skin: R heel wound  Head/Eyes/Ears/Mouth: No headache; Normal hearing; Normal vision w/o blurriness; No sinus pain/discomfort, sore throat  Respiratory: No dyspnea, cough, wheezing, hemoptysis  CV: No chest pain, PND, orthopnea  GI: no abdominal pain  : No increased frequency, dysuria, hematuria, nocturia  MSK: No joint pain/swelling; no back pain; + LE edema  Neuro: No dizziness/lightheadedness, weakness, seizures, numbness, tingling  Heme: No easy bruising or bleeding  Endo: No heat/cold intolerance  Psych: No significant nervousness, anxiety, stress, depression  All other systems were reviewed and are negative, except as noted.    Physical Exam:   GENERAL:  No acute distress  HEENT:  Normocephalic/atraumatic, no scleral icterus  CHEST:  Clear to auscultation bilaterally, no wheezes/rales/ronchi, no accessory muscle use  HEART:  Regular rate and rhythm, no murmurs/rubs/gallops  ABDOMEN:  Soft, non-tender, non-distended,  well healed incision  EXTREMITIES: RLE with large 4x4 cm ulcer, with overlying granulation tissue and surrounding erythema, some visible bone. +1 BLE edema.  SKIN:  No rash/erythema/ecchymoses/petechiae/wounds/abscess/warm/dry  NEURO:  Alert and oriented x 3, no asterixis

## 2020-11-24 NOTE — DISCHARGE NOTE PROVIDER - EXTENDED VTE YES NO FOR MLM ENOXAPARIN
Department of Neurocritical care  Attending Consult Note        CHIEF COMPLAINT:  ACOM artery aneurysm s/p stent assisted coiling    History Obtained From:  patient, electronic medical record    HISTORY OF PRESENT ILLNESS:                The patient is a 54 y.o. female with a history of SAH and ruptured ACOM artery aneurysm who received primary coil embolization on April 14, 2017. She presents with recanalization of her ACOM artery aneurysm and underwent Y stent assisted coiling.     Past Medical History:        Diagnosis Date    Acute ischemic left middle cerebral artery (MCA) stroke ([facility] 04/14/2017    QUESTIONABLE RUPTURED ANEURSYM-COMA X 1 MONTH'    Anterior communicating artery aneurysm 04/14/2017    Ruptured, Coiled 4/14/2017    Arthritis     Blurry vision, bilateral 05/08/2017    Hematoma of groin 10/17/2017    Right Groin    History of total bilateral knee replacement 2016    Left- 5/2016; Right- 8/2016    Patient in clinical research study 10/31/2017    SMART Registry: Enrolled 10/31/2017; Expected Completion Date: 10/31/2018    RA (rheumatoid arthritis) ([facility] 2011    Seizure ([facility] 04/2017    NONE SINCE    Tingling in extremities 04/14/2017    Right Extremities    Wears glasses 2007    READING     Past Surgical History:        Procedure Laterality Date    CRANIOTOMY Right 4/16/2017    RIGHT FRONTAL EXTERNAL VENTRICULAR DRAIN PLACEMENT performed by Vola Closs, MD at [address] Right 2016    KNEE    JOINT REPLACEMENT Left 2016    KNEE    TN EGD PERCUTANEOUS PLACEMENT GASTROSTOMY TUBE N/A 5/3/2017    EGD ESOPHAGOGASTRODUODENOSCOPY PEG TUBE INSERTION- GI UNIT SCHEDULED performed by Fariba Singh MD at [address] APPROX 07/2017    TRACHEOSTOMY  06/2017    REMOVED     TRACHEOTOMY N/A 5/3/2017    TRACHEOTOMY performed by Fariba Singh MD at [facility]     Current Medications:   Current Facility-Administered Medications: 0.9 % sodium
,

## 2020-11-24 NOTE — DISCHARGE NOTE PROVIDER - NSDCFUADDINST_GEN_ALL_CORE_FT
Podiatry Discharge Instructions:  Follow up: Please follow up with Dr. Wang within 1 week of discharge from the hospital, please call [phone] for appointment and discuss that you recently were seen in the hospital.  Wound Care: Please please change outer layer of dressing and apply 4x4 gauze, ABD pads, kerlix. Please leave adpatic intact.  Weight bearing: Please weight bear as tolerated in a surgical shoe w/ weight distributed to forefoot.  Antibiotics: Please continue as instructed. Podiatry Discharge Instructions:  Follow up: Please follow up with [facility]. [facility] within 1 week of discharge from the hospital, please call [phone] for appointment and discuss that you recently were seen in the hospital.  Wound Care: Please please change outer layer of dressing and apply 4x4 gauze, ABD pads, kerlix. Please leave adpatic intact.  Weight bearing: Please weight bear as tolerated in a surgical shoe w/ weight distributed to forefoot.  Antibiotics: Please continue as instructed. Podiatry Discharge Instructions:  Follow up: Please follow up with Dr. Wang [facility] within 1 week of discharge from the hospital, please call [phone] for appointment and discuss that you recently were seen in the hospital.  Wound Care: Please please change outer layer of dressing and apply 4x4 gauze, ABD pads, kerlix. Please leave adaptic intact.  Weight bearing: Please weight bear as tolerated in a surgical shoe w/ weight distributed to forefoot.  Antibiotics: Please continue as instructed.

## 2020-11-24 NOTE — DISCHARGE NOTE PROVIDER - HOSPITAL COURSE
64M with IDDM, HLD, obesity, HFpEF with mild LV diastolic dysfunction, MGUS, chronic anemia with a history of duodenal ulcer 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 Known history of non healing R heel ulcer. Followed by podiatry outpatient Dr. Patel, had recent debridement wound (10/20) cx grew ec faecalis, pseudomonas, enterococcus. Tx with cipro. No improvement post debridement and PO antibiotic treatment.  Admitted on 11/20/20 with non healing R heel ulcer.   R wound cx on admission grew Pseudomonas resistant to cipro; started on meropenem 11/20-11/23, vanco 11/23-11/25, Cefepime 11/23, MRI c/w early OM. Podiatry and ID consulted. Broadened to meropenem. MMF held on admission, Everolimus reduced, WBC scan -neg for OM.  Went to OR (11/24)-> wound debridement (cxs grew staph epi), bone bx path report  aggregate of gray-tan, necrotic, soft tissue fragments. Was found to be Neutropenic valcyte dose reduced 450mg daily (was on 900mg daily CMV +/-)      PICC line for long term antibiotics possible 6 weeks per ID.     On DC Immuno:  Everolimus -------, MMF on hold, pred 5mg    Abx: Cefepime 2 gm R5addan (start date 1/20/20) x 6 weeks 64M with IDDM, HLD, obesity, HFpEF with mild LV diastolic dysfunction, MGUS, chronic anemia with a history of duodenal ulcer 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     Known history of non healing R heel ulcer. Followed by Podiatry outpatient (Dr. Patel), had recent wound debridement (10/20) cx grew ec faecalis, pseudomonas, enterococcus. Tx with Cipro without improvement.  Admitted on 11/20/20 with non-healing R heel ulcer.     R heel wound cx on admission grew Pseudomonas resistant to Cipro; started on Meropenem 11/20-11/23, Vanco 11/23-11/25, Cefepime 11/23.  MRI c/w early OM. Podiatry and ID consulted. Broadened to Meropenem then back to Cefepime.  PICC line placed on 12/1/2020.  MMF held on admission, Everolimus reduced, WBC scan was negative for OM.      On 11/24 s/p wound debridement (cxs grew staph epi), bone bx path with +OM; aggregate of gray-tan, necrotic, soft tissue fragments.     Was also found with neutropenia this admission, Valcyte was reduced (was on 900mg daily CMV +/-).      He is a known diabetic, insulin was adjusted.      He was safely discharged with family on 12/2/2020 with the following plan:  -Cefepime 2g q8h via PICC line for long term antibiotics possible 6 weeks per ID starting 12/1/2020  -wound care and Podiatry f/u as instructed  -Endocrinology follow-up  -ID follow-up with Dr. Pérez as instructed  -Follow-up with Dr. Chris as scheduled    DC Immuno:  Everolimus -------, MMF on HOLD, Pred 5 64M with IDDM, HLD, obesity, HFpEF with mild LV diastolic dysfunction, MGUS, chronic anemia with a history of duodenal ulcer 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     Known history of non healing R heel ulcer. Followed by Podiatry outpatient (Dr. Patel), had recent wound debridement (10/20) cx grew ec faecalis, pseudomonas, enterococcus. Tx with Cipro without improvement.  Admitted on 11/20/20 with non-healing R heel ulcer.     R heel wound cx on admission grew Pseudomonas resistant to Cipro; started on Meropenem 11/20-11/23, Vanco 11/23-11/25, Cefepime 11/23.  MRI c/w early OM. Podiatry and ID consulted. Broadened to Meropenem then back to Cefepime.  PICC line placed on 12/1/2020.  MMF held on admission, Everolimus reduced, WBC scan was negative for OM.      On 11/24 s/p wound debridement (cxs grew staph epi), bone bx path with +OM; aggregate of gray-tan, necrotic, soft tissue fragments.     Was also found with neutropenia this admission, Valcyte was reduced (was on 900mg daily CMV +/-).      He is a known diabetic, insulin was adjusted.      He was safely discharged with family on 12/2/2020 with the following plan:  -Cefepime 2g q8h via PICC line for long term antibiotics for 6 weeks per ID starting 12/1/2020  -wound care and Podiatry f/u as instructed  -Endocrinology follow-up  -ID follow-up with Dr. Pérez as instructed  -Follow-up with Dr. Chris as scheduled    DC Immuno:  Everolimus 2mg bid, MMF on HOLD, Pred 5

## 2020-11-24 NOTE — PROGRESS NOTE ADULT - SUBJECTIVE AND OBJECTIVE BOX
INTERVAL HPI/OVERNIGHT EVENTS:    overnight events noted  OR today for R partial calcanectomy     MEDICATIONS  (STANDING):  aspirin enteric coated 81 milliGRAM(s) Oral daily  cefepime   IVPB 2000 milliGRAM(s) IV Intermittent every 12 hours  collagenase Ointment 1 Application(s) Topical daily  Dakins Solution - 1/2 Strength 1 Application(s) Topical daily  dextrose 40% Gel 15 Gram(s) Oral once  dextrose 5%. 1000 milliLiter(s) (50 mL/Hr) IV Continuous <Continuous>  dextrose 5%. 1000 milliLiter(s) (100 mL/Hr) IV Continuous <Continuous>  dextrose 5%. 1000 milliLiter(s) (100 mL/Hr) IV Continuous <Continuous>  dextrose 50% Injectable 25 Gram(s) IV Push once  dextrose 50% Injectable 12.5 Gram(s) IV Push once  dextrose 50% Injectable 25 Gram(s) IV Push once  everolimus (ZORTRESS) 2 milliGRAM(s) Oral <User Schedule>  furosemide   Injectable 20 milliGRAM(s) IV Push daily  glucagon  Injectable 1 milliGRAM(s) IntraMuscular once  insulin glargine Injectable (LANTUS) 8 Unit(s) SubCutaneous at bedtime  insulin lispro (ADMELOG) corrective regimen sliding scale   SubCutaneous every 6 hours  insulin lispro Injectable (ADMELOG) 10 Unit(s) SubCutaneous three times a day before meals  magnesium oxide 800 milliGRAM(s) Oral with breakfast  pantoprazole    Tablet 40 milliGRAM(s) Oral before breakfast  predniSONE   Tablet 5 milliGRAM(s) Oral daily  tamsulosin 0.4 milliGRAM(s) Oral at bedtime  trimethoprim   80 mG/sulfamethoxazole 400 mG 1 Tablet(s) Oral daily  valGANciclovir 450 milliGRAM(s) Oral daily  vancomycin  IVPB 1250 milliGRAM(s) IV Intermittent daily    MEDICATIONS  (PRN):  acetaminophen   Tablet .. 650 milliGRAM(s) Oral every 6 hours PRN Temp greater or equal to 38C (100.4F), Mild Pain (1 - 3)      Allergies    codeine (Anaphylaxis)    Intolerances        Review of Systems:    General:  No wt loss, fevers, chills, night sweats,fatigue,   Eyes:  Good vision, no reported pain  ENT:  No sore throat, pain, runny nose, dysphagia  CV:  No pain, palpitatioins, hypo/hypertension  Resp:  No dyspnea, cough, tachypnea, wheezing  GI:  No pain, No nausea, No vomiting, No diarrhea, No constipatiion, No weight loss, No fever, No pruritis, No rectal bleeding, No tarry stools, No dysphagia,  :  No pain, bleeding, incontinence, nocturia  Muscle:  No pain, weakness  Neuro:  No weakness, tingling, memory problems  Psych:  No fatigue, insomnia, mood problems, depression  Endocrine:  No polyuria, polydypsia, cold/heat intolerance  Heme:  No petechiae, ecchymosis, easy bruisability  Skin:  No rash, tattoos, scars, edema      Vital Signs Last 24 Hrs  T(C): 37 (24 Nov 2020 06:31), Max: 37 (24 Nov 2020 01:00)  T(F): 98.6 (24 Nov 2020 06:31), Max: 98.6 (24 Nov 2020 01:00)  HR: 75 (24 Nov 2020 06:31) (75 - 87)  BP: 119/63 (24 Nov 2020 06:31) (119/63 - 150/68)  BP(mean): --  RR: 18 (24 Nov 2020 06:31) (18 - 18)  SpO2: 98% (24 Nov 2020 06:31) (98% - 100%)    PHYSICAL EXAM:    Constitutional: NAD  HEENT: EOMI, throat clear  Neck: No LAD, supple  Gastrointestinal: BS+, soft, NT/ND  Extremities: + edema  Neurological: A/O x 3, no focal deficits        LABS:                        8.9    2.16  )-----------( 160      ( 24 Nov 2020 06:15 )             27.7     11-23    140  |  106  |  25<H>  ----------------------------<  145<H>  4.4   |  25  |  1.44<H>    Ca    8.9      23 Nov 2020 05:58  Phos  3.2     11-24  Mg     2.0     11-24    TPro  5.8<L>  /  Alb  3.2<L>  /  TBili  0.1<L>  /  DBili  x   /  AST  15  /  ALT  15  /  AlkPhos  90  11-23    PT/INR - ( 24 Nov 2020 06:15 )   PT: 11.6 sec;   INR: 0.97 ratio         PTT - ( 24 Nov 2020 06:15 )  PTT:30.1 sec      RADIOLOGY & ADDITIONAL TESTS:

## 2020-11-24 NOTE — PROGRESS NOTE ADULT - ASSESSMENT
Assessment:  The patient is a 64 year old male with past medical history of cirrhosis s/p OLT (7/2/20 at [facility]), IDDM, MGUS who presented to [facility] on 11/20 with worsening drainage at his right heel with concern for osteomyelitis. Had a debridement in 10/2020 with cultures growing fairly broadly susceptible Enterococcus faecalis, Pseudomonas and Enterobacter. Patient was treated with Ciprofloxacin Patient has continued to have right heel drainage and wound at the heel.     Plan:  # Suspected right heel would rule out osteomyelitis  - Continue intravenous cefepime 2g IV Q12H and intravenous vancomycin, would dose at 1.25g IV Q24H. Check trough prior to third dose. Target trough of 10-15  -Continue to monitor renal function and vancomycin levels to avoid nephrotoxicity / otoxicity secondary to vancomycin  -Followup on prelim blood cultures   -Followup on prelim foot surgical swab cultures   -Work for possible surgical intervention per podiatry    #Liver Transplant Recipient, Prophylactic Antibiotic (CMV +/-)  --Continue Valcyte 450 mg PO Q24H (CrCl currently 50-59)  --Continue Bactrim SS PO QD     Assessment:  The patient is a 64 year old male with past medical history of cirrhosis s/p OLT (7/2/20 at [facility]), IDDM, MGUS who presented to [facility] on 11/20 with worsening drainage at his right heel with concern for osteomyelitis. Had a debridement in 10/2020 with cultures growing fairly broadly susceptible Enterococcus faecalis, Pseudomonas and Enterobacter. Patient was treated with Ciprofloxacin Patient has continued to have right heel drainage and wound at the heel.     Plan:  # Suspected right heel would rule out osteomyelitis  - Discontinue intravenous vancomycin   - Continue intravenous cefepime   - Followup on prelim blood cultures   - Followup on prelim foot surgical swab cultures and bone cultures  - S/p debridement today     #Liver Transplant Recipient, Prophylactic Antibiotic (CMV +/-)  --Continue Valcyte 450 mg PO Q24H (CrCl currently 50-59)  --Continue Bactrim SS PO QD     Assessment:  The patient is a 64 year old male with past medical history of cirrhosis s/p OLT (7/2/20 at [facility]), IDDM, MGUS who presented to [facility] on 11/20 with worsening drainage at his right heel with concern for osteomyelitis. Had a debridement in 10/2020 with cultures growing fairly broadly susceptible Enterococcus faecalis, Pseudomonas and Enterobacter. Patient was treated with Ciprofloxacin Patient has continued to have right heel drainage and wound at the heel.     Plan:  # Suspected right heel would rule out osteomyelitis  - Can continue Vancomycin for now (would followup on surgical cultures from debridement today)  - Continue intravenous cefepime   - Followup on prelim blood cultures   - Followup on prelim foot surgical swab cultures and bone cultures  - planned for partial calcanectomy today     #Liver Transplant Recipient, Prophylactic Antibiotic (CMV +/-)  --Continue Valcyte 450 mg PO Q24H (CrCl currently 50-59)  --Continue Bactrim SS PO QD

## 2020-11-25 LAB
ALBUMIN SERPL ELPH-MCNC: 3.2 G/DL — LOW (ref 3.3–5)
ALP SERPL-CCNC: 92 U/L — SIGNIFICANT CHANGE UP (ref 40–120)
ALT FLD-CCNC: 20 U/L — SIGNIFICANT CHANGE UP (ref 10–45)
ANION GAP SERPL CALC-SCNC: 9 MMOL/L — SIGNIFICANT CHANGE UP (ref 5–17)
AST SERPL-CCNC: 23 U/L — SIGNIFICANT CHANGE UP (ref 10–40)
BASOPHILS # BLD AUTO: 0.02 K/UL — SIGNIFICANT CHANGE UP (ref 0–0.2)
BASOPHILS NFR BLD AUTO: 0.8 % — SIGNIFICANT CHANGE UP (ref 0–2)
BILIRUB SERPL-MCNC: 0.1 MG/DL — LOW (ref 0.2–1.2)
BUN SERPL-MCNC: 31 MG/DL — HIGH (ref 7–23)
CALCIUM SERPL-MCNC: 8.6 MG/DL — SIGNIFICANT CHANGE UP (ref 8.4–10.5)
CHLORIDE SERPL-SCNC: 105 MMOL/L — SIGNIFICANT CHANGE UP (ref 96–108)
CO2 SERPL-SCNC: 26 MMOL/L — SIGNIFICANT CHANGE UP (ref 22–31)
CREAT SERPL-MCNC: 1.36 MG/DL — HIGH (ref 0.5–1.3)
EOSINOPHIL # BLD AUTO: 0.08 K/UL — SIGNIFICANT CHANGE UP (ref 0–0.5)
EOSINOPHIL NFR BLD AUTO: 3.1 % — SIGNIFICANT CHANGE UP (ref 0–6)
EVEROLIMUS, WHOLE BLOOD RESULT: 3.8 NG/ML — SIGNIFICANT CHANGE UP (ref 3–?)
GLUCOSE SERPL-MCNC: 132 MG/DL — HIGH (ref 70–99)
GRAM STN FLD: SIGNIFICANT CHANGE UP
HCT VFR BLD CALC: 28.4 % — LOW (ref 39–50)
HGB BLD-MCNC: 9 G/DL — LOW (ref 13–17)
IMM GRANULOCYTES NFR BLD AUTO: 0.8 % — SIGNIFICANT CHANGE UP (ref 0–1.5)
LYMPHOCYTES # BLD AUTO: 0.86 K/UL — LOW (ref 1–3.3)
LYMPHOCYTES # BLD AUTO: 32.8 % — SIGNIFICANT CHANGE UP (ref 13–44)
MAGNESIUM SERPL-MCNC: 2 MG/DL — SIGNIFICANT CHANGE UP (ref 1.6–2.6)
MCHC RBC-ENTMCNC: 28.9 PG — SIGNIFICANT CHANGE UP (ref 27–34)
MCHC RBC-ENTMCNC: 31.7 GM/DL — LOW (ref 32–36)
MCV RBC AUTO: 91.3 FL — SIGNIFICANT CHANGE UP (ref 80–100)
MONOCYTES # BLD AUTO: 0.25 K/UL — SIGNIFICANT CHANGE UP (ref 0–0.9)
MONOCYTES NFR BLD AUTO: 9.5 % — SIGNIFICANT CHANGE UP (ref 2–14)
NEUTROPHILS # BLD AUTO: 1.39 K/UL — LOW (ref 1.8–7.4)
NEUTROPHILS NFR BLD AUTO: 53 % — SIGNIFICANT CHANGE UP (ref 43–77)
NRBC # BLD: 0 /100 WBCS — SIGNIFICANT CHANGE UP (ref 0–0)
PHOSPHATE SERPL-MCNC: 3.4 MG/DL — SIGNIFICANT CHANGE UP (ref 2.5–4.5)
PLATELET # BLD AUTO: 174 K/UL — SIGNIFICANT CHANGE UP (ref 150–400)
POTASSIUM SERPL-MCNC: 4.4 MMOL/L — SIGNIFICANT CHANGE UP (ref 3.5–5.3)
POTASSIUM SERPL-SCNC: 4.4 MMOL/L — SIGNIFICANT CHANGE UP (ref 3.5–5.3)
PROT SERPL-MCNC: 5.8 G/DL — LOW (ref 6–8.3)
RBC # BLD: 3.11 M/UL — LOW (ref 4.2–5.8)
RBC # FLD: 13.1 % — SIGNIFICANT CHANGE UP (ref 10.3–14.5)
SODIUM SERPL-SCNC: 140 MMOL/L — SIGNIFICANT CHANGE UP (ref 135–145)
SPECIMEN SOURCE: SIGNIFICANT CHANGE UP
WBC # BLD: 2.62 K/UL — LOW (ref 3.8–10.5)
WBC # FLD AUTO: 2.62 K/UL — LOW (ref 3.8–10.5)

## 2020-11-25 PROCEDURE — 99232 SBSQ HOSP IP/OBS MODERATE 35: CPT

## 2020-11-25 PROCEDURE — 99232 SBSQ HOSP IP/OBS MODERATE 35: CPT | Mod: GC

## 2020-11-25 PROCEDURE — 99232 SBSQ HOSP IP/OBS MODERATE 35: CPT | Mod: GC,24

## 2020-11-25 RX ORDER — INSULIN LISPRO 100/ML
VIAL (ML) SUBCUTANEOUS
Refills: 0 | Status: DISCONTINUED | OUTPATIENT
Start: 2020-11-25 | End: 2020-11-29

## 2020-11-25 RX ADMIN — VALGANCICLOVIR 450 MILLIGRAM(S): 450 TABLET, FILM COATED ORAL at 12:21

## 2020-11-25 RX ADMIN — TAMSULOSIN HYDROCHLORIDE 0.4 MILLIGRAM(S): 0.4 CAPSULE ORAL at 21:41

## 2020-11-25 RX ADMIN — PANTOPRAZOLE SODIUM 40 MILLIGRAM(S): 20 TABLET, DELAYED RELEASE ORAL at 06:02

## 2020-11-25 RX ADMIN — Medication 20 MILLIGRAM(S): at 06:02

## 2020-11-25 RX ADMIN — INSULIN GLARGINE 8 UNIT(S): 100 INJECTION, SOLUTION SUBCUTANEOUS at 21:51

## 2020-11-25 RX ADMIN — Medication 10 UNIT(S): at 12:21

## 2020-11-25 RX ADMIN — CEFEPIME 100 MILLIGRAM(S): 1 INJECTION, POWDER, FOR SOLUTION INTRAMUSCULAR; INTRAVENOUS at 17:40

## 2020-11-25 RX ADMIN — CEFEPIME 100 MILLIGRAM(S): 1 INJECTION, POWDER, FOR SOLUTION INTRAMUSCULAR; INTRAVENOUS at 06:30

## 2020-11-25 RX ADMIN — Medication 2: at 21:42

## 2020-11-25 RX ADMIN — Medication 1 APPLICATION(S): at 12:21

## 2020-11-25 RX ADMIN — Medication 5 MILLIGRAM(S): at 06:02

## 2020-11-25 RX ADMIN — Medication 1 TABLET(S): at 12:22

## 2020-11-25 RX ADMIN — MAGNESIUM OXIDE 400 MG ORAL TABLET 800 MILLIGRAM(S): 241.3 TABLET ORAL at 09:18

## 2020-11-25 RX ADMIN — Medication 81 MILLIGRAM(S): at 12:21

## 2020-11-25 RX ADMIN — EVEROLIMUS 2 MILLIGRAM(S): 10 TABLET ORAL at 09:18

## 2020-11-25 RX ADMIN — Medication 4: at 00:43

## 2020-11-25 RX ADMIN — Medication 2: at 17:40

## 2020-11-25 RX ADMIN — Medication 10 UNIT(S): at 08:17

## 2020-11-25 RX ADMIN — EVEROLIMUS 2 MILLIGRAM(S): 10 TABLET ORAL at 20:26

## 2020-11-25 NOTE — PROGRESS NOTE ADULT - ASSESSMENT
Assessment:  The patient is a 64 year old male with past medical history of cirrhosis s/p OLT (7/2/20 at [facility]), IDDM, MGUS who presented to [facility] on 11/20 with worsening drainage at his right heel with concern for osteomyelitis. Had a debridement in 10/2020 with cultures growing fairly broadly susceptible Enterococcus faecalis, Pseudomonas and Enterobacter. Patient was treated with Ciprofloxacin Patient has continued to have right heel drainage and wound at the heel.     Plan:  # Suspected right heel would rule out osteomyelitis  - Can continue vancomycin for now (would followup on surgical cultures from debridement today)  - Continue intravenous cefepime   - Followup on prelim blood cultures   - Followup on prelim foot surgical swab cultures and bone cultures  - s/p partial calcanectomy today     #Liver Transplant Recipient, Prophylactic Antibiotic (CMV +/-)  --Continue Valcyte 450 mg PO Q24H (CrCl currently 50-59)  --Continue Bactrim SS PO QD     Assessment:  The patient is a 64 year old male with past medical history of cirrhosis s/p OLT (7/2/20 at [facility]), IDDM, MGUS who presented to [facility] on 11/20 with worsening drainage at his right heel with concern for osteomyelitis. Had a debridement in 10/2020 with cultures growing fairly broadly susceptible Enterococcus faecalis, Pseudomonas and Enterobacter. Patient was treated with Ciprofloxacin Patient has continued to have right heel drainage and wound at the heel.     Plan:  # Suspected right heel would rule out osteomyelitis  - Discontinue intravenous vancomycin   - Continue intravenous cefepime   - Await bone biopsy results and final blood culture results  - Leukopenia likely due to bactrim and valcyte, continue for now     #Liver Transplant Recipient, Prophylactic Antibiotic (CMV +/-)  - Continue Valcyte 450 mg PO Q24H (CrCl currently 50-59)  -Continue Bactrim SS PO QD  - ID will continue to follow    Charlie Lafleur MD PGY-4   Fellow, Infectious Diseases   Pager: [phone]  If no response, after 5pm and on weekends: Call [phone]       Assessment:  The patient is a 64 year old male with past medical history of cirrhosis s/p OLT (7/2/20 at [facility]), IDDM, MGUS who presented to [facility] on 11/20 with worsening drainage at his right heel with concern for osteomyelitis. Had a debridement in 10/2020 with cultures growing fairly broadly susceptible Enterococcus faecalis, Pseudomonas and Enterobacter. Patient was treated with Ciprofloxacin Patient has continued to have right heel drainage and wound at the heel.     Plan:  # Suspected right heel would rule out osteomyelitis  - Discontinue intravenous vancomycin   - Continue intravenous cefepime   - Await bone biopsy results and final blood culture results  - Leukopenia likely due to bactrim and valcyte, continue for now     #Liver Transplant Recipient, Prophylactic Antibiotic (CMV +/-)  - Continue Valcyte 450 mg PO Q24H (CrCl currently 50-59)  - Continue Bactrim SS PO QD  - ID will continue to follow    Charlie Lafleur MD PGY-4   Fellow, Infectious Diseases   Pager: [phone]  If no response, after 5pm and on weekends: Call [phone]

## 2020-11-25 NOTE — PROGRESS NOTE ADULT - ATTENDING COMMENTS
64 year old male with past medical history of cirrhosis s/p OLT (7/2/20 at [facility]), IDDM, MGUS who presented to [facility] on 11/20 with worsening drainage at his right heel with concern for osteomyelitis.    Superficial Cultures here with pseudomonas   MRSA Nasal PCR negative  s/p right foot heel incision and drainage w/ application of stravix and bone biopsy on 11/24  Per Podiatry low concern for OM    I would continue Cefepime at this point  Would maintain Cefepime pending at least 48-72H on prelim operative cultures  I would discontinue Vancomycin; so far operative Cx with NGTD and superficial cultures with pseudomonas  Definitive determination if there is residual OM will be based off of the pathology but this can take 5-7 days  Anticipate (if no growth on operative cultures at 48-72H) to discharge prior to pathology results with close outpatient followup    I will be away tomorrow. Please contact the Infectious Diseases Office to contact the covering Infectious Diseases Attending.     Eusebio Pérez M.D.  [facility] Division of Infectious Disease  8AM-5PM: Pager Number [phone]  After Hours (or if no response): Please contact the Infectious Diseases Office at [phone]     The above assessment and plan were discussed with transplant surgery team

## 2020-11-25 NOTE — PROGRESS NOTE ADULT - SUBJECTIVE AND OBJECTIVE BOX
Chief Complaint:  Patient is a 64y old  Male who presents with a chief complaint of R heel wound (2020 10:08)      Interval Events:         Allergies:  codeine (Anaphylaxis)      Hospital Medications:  acetaminophen   Tablet .. 650 milliGRAM(s) Oral every 6 hours PRN  aspirin enteric coated 81 milliGRAM(s) Oral daily  cefepime   IVPB 2000 milliGRAM(s) IV Intermittent every 12 hours  collagenase Ointment 1 Application(s) Topical daily  dextrose 40% Gel 15 Gram(s) Oral once  dextrose 5%. 1000 milliLiter(s) IV Continuous <Continuous>  dextrose 5%. 1000 milliLiter(s) IV Continuous <Continuous>  dextrose 5%. 1000 milliLiter(s) IV Continuous <Continuous>  dextrose 50% Injectable 25 Gram(s) IV Push once  dextrose 50% Injectable 12.5 Gram(s) IV Push once  dextrose 50% Injectable 25 Gram(s) IV Push once  everolimus (ZORTRESS) 2 milliGRAM(s) Oral <User Schedule>  furosemide   Injectable 20 milliGRAM(s) IV Push daily  glucagon  Injectable 1 milliGRAM(s) IntraMuscular once  insulin glargine Injectable (LANTUS) 8 Unit(s) SubCutaneous at bedtime  insulin lispro (ADMELOG) corrective regimen sliding scale   SubCutaneous Before meals and at bedtime  insulin lispro Injectable (ADMELOG) 10 Unit(s) SubCutaneous three times a day before meals  magnesium oxide 800 milliGRAM(s) Oral with breakfast  pantoprazole    Tablet 40 milliGRAM(s) Oral before breakfast  predniSONE   Tablet 5 milliGRAM(s) Oral daily  tamsulosin 0.4 milliGRAM(s) Oral at bedtime  trimethoprim   80 mG/sulfamethoxazole 400 mG 1 Tablet(s) Oral daily  valGANciclovir 450 milliGRAM(s) Oral daily      PMHX/PSHX:  GIB (gastrointestinal bleeding)    GERD with esophagitis    Hepatic encephalopathy    Obesity    Fatty liver disease, nonalcoholic    Renal stones    Hypertension    Neuropathy    Hypercholesteremia    Diabetes    S/P cholecystectomy    No significant past surgical history        Family history:  Family history of type 2 diabetes mellitus    Family history of hypertension    Family history of stomach cancer    No pertinent family history in first degree relatives        ROS:     General:  No weight loss, fevers, chills, night sweats, fatigue   Eyes:  No vision changes  ENT:  No sore throat, pain, runny nose  CV:  No chest pain, palpitations, dizziness   Resp:  No SOB, cough, wheezing  GI:  See HPI  :  No burning with urination, hematuria  Muscle:  No pain, weakness  Neuro:  No weakness/tingling, memory problems  Psych:  No fatigue, insomnia, mood problems, depression  Heme:  No easy bruisability  Skin:  No rash, edema      PHYSICAL EXAM:     GENERAL:  Non-toxic, no distress, resting comfortably   HEENT:  NC/AT,  conjunctivae clear, sclera anicteric  CHEST:  No increased effort w/ respirations  HEART:  Regular rhythm & rate  ABDOMEN:  Soft, non-tender, non-distended  EXTREMITIES:  1+ pitting edema RLE +LLE covered with ACE-Wrap  SKIN:  No rash/erythema/ecchymoses/petechiae/jaundice   NEURO:  Alert, orientedx3    Vital Signs:  Vital Signs Last 24 Hrs  T(C): 36.8 (2020 12:13), Max: 36.8 (2020 01:00)  T(F): 98.2 (:13), Max: 98.3 (2020 01:00)  HR: 77 (2020 12:13) (69 - 85)  BP: 117/62 (2020 12:13) (116/61 - 153/65)  BP(mean): 102 (2020 18:30) (82 - 103)  RR: 18 (2020 12:13) (16 - 18)  SpO2: 99% (2020 12:13) (99% - 100%)  Daily Height in cm: 185.42 (2020 16:06)    Daily Weight in k.1 (2020 05:05)    LABS:                        9.0    2.62  )-----------( 174      ( 2020 06:14 )             28.4         140  |  105  |  31<H>  ----------------------------<  132<H>  4.4   |  26  |  1.36<H>    Ca    8.6      2020 06:12  Phos  3.4       Mg     2.0         TPro  5.8<L>  /  Alb  3.2<L>  /  TBili  0.1<L>  /  DBili  x   /  AST  23  /  ALT  20  /  AlkPhos  92      LIVER FUNCTIONS - ( 2020 06:12 )  Alb: 3.2 g/dL / Pro: 5.8 g/dL / ALK PHOS: 92 U/L / ALT: 20 U/L / AST: 23 U/L / GGT: x           PT/INR - ( 2020 06:15 )   PT: 11.6 sec;   INR: 0.97 ratio    PTT - ( 2020 06:15 )  PTT:30.1 sec        Imaging:             Chief Complaint:  Patient is a 64y old  Male who presents with a chief complaint of R heel wound (2020 10:08)      Interval Events:     NM WBC study negative for infection.  Patient had right heel bone biopsy - no evidence of bone or soft tissue infection per Podiatry.   Patient reports some right foot pain, otherwise, no complaints.     Allergies:  codeine (Anaphylaxis)      Hospital Medications:  acetaminophen   Tablet .. 650 milliGRAM(s) Oral every 6 hours PRN  aspirin enteric coated 81 milliGRAM(s) Oral daily  cefepime   IVPB 2000 milliGRAM(s) IV Intermittent every 12 hours  collagenase Ointment 1 Application(s) Topical daily  dextrose 40% Gel 15 Gram(s) Oral once  dextrose 5%. 1000 milliLiter(s) IV Continuous <Continuous>  dextrose 5%. 1000 milliLiter(s) IV Continuous <Continuous>  dextrose 5%. 1000 milliLiter(s) IV Continuous <Continuous>  dextrose 50% Injectable 25 Gram(s) IV Push once  dextrose 50% Injectable 12.5 Gram(s) IV Push once  dextrose 50% Injectable 25 Gram(s) IV Push once  everolimus (ZORTRESS) 2 milliGRAM(s) Oral <User Schedule>  furosemide   Injectable 20 milliGRAM(s) IV Push daily  glucagon  Injectable 1 milliGRAM(s) IntraMuscular once  insulin glargine Injectable (LANTUS) 8 Unit(s) SubCutaneous at bedtime  insulin lispro (ADMELOG) corrective regimen sliding scale   SubCutaneous Before meals and at bedtime  insulin lispro Injectable (ADMELOG) 10 Unit(s) SubCutaneous three times a day before meals  magnesium oxide 800 milliGRAM(s) Oral with breakfast  pantoprazole    Tablet 40 milliGRAM(s) Oral before breakfast  predniSONE   Tablet 5 milliGRAM(s) Oral daily  tamsulosin 0.4 milliGRAM(s) Oral at bedtime  trimethoprim   80 mG/sulfamethoxazole 400 mG 1 Tablet(s) Oral daily  valGANciclovir 450 milliGRAM(s) Oral daily      PMHX/PSHX:  GIB (gastrointestinal bleeding)    GERD with esophagitis    Hepatic encephalopathy    Obesity    Fatty liver disease, nonalcoholic    Renal stones    Hypertension    Neuropathy    Hypercholesteremia    Diabetes    S/P cholecystectomy    No significant past surgical history        Family history:  Family history of type 2 diabetes mellitus    Family history of hypertension    Family history of stomach cancer    No pertinent family history in first degree relatives        ROS:     General:  No weight loss, fevers, chills, night sweats, fatigue   Eyes:  No vision changes  ENT:  No sore throat, pain, runny nose  CV:  No chest pain, palpitations, dizziness   Resp:  No SOB, cough, wheezing  GI:  See HPI  :  No burning with urination, hematuria  Muscle:  No pain, weakness  Neuro:  No weakness/tingling, memory problems  Psych:  No fatigue, insomnia, mood problems, depression  Heme:  No easy bruisability  Skin:  No rash, edema      PHYSICAL EXAM:     GENERAL:  Non-toxic, no distress, resting comfortably in bed  HEENT:  NC/AT,  conjunctivae clear, sclera anicteric  CHEST:  No increased effort w/ respirations  HEART:  Regular rhythm & rate  ABDOMEN:  Soft, non-tender, non-distended  EXTREMITIES:  2+ pitting edema RLE +R. foot covered in ACE wrap  SKIN:  No rash/erythema/ecchymoses/petechiae/jaundice   NEURO:  Alert, orientedx3    Vital Signs:  Vital Signs Last 24 Hrs  T(C): 36.8 (2020 12:13), Max: 36.8 (2020 01:00)  T(F): 98.2 (2020 12:13), Max: 98.3 (2020 01:00)  HR: 77 (2020 12:13) (69 - 85)  BP: 117/62 (2020 12:13) (116/61 - 153/65)  BP(mean): 102 (2020 18:30) (82 - 103)  RR: 18 (2020 12:13) (16 - 18)  SpO2: 99% (2020 12:13) (99% - 100%)  Daily Height in cm: 185.42 (2020 16:06)    Daily Weight in k.1 (2020 05:05)    LABS:                        9.0    2.62  )-----------( 174      ( 2020 06:14 )             28.4     -    140  |  105  |  31<H>  ----------------------------<  132<H>  4.4   |  26  |  1.36<H>    Ca    8.6      2020 06:12  Phos  3.4       Mg     2.0         TPro  5.8<L>  /  Alb  3.2<L>  /  TBili  0.1<L>  /  DBili  x   /  AST  23  /  ALT  20  /  AlkPhos  92      LIVER FUNCTIONS - ( 2020 06:12 )  Alb: 3.2 g/dL / Pro: 5.8 g/dL / ALK PHOS: 92 U/L / ALT: 20 U/L / AST: 23 U/L / GGT: x           PT/INR - ( 2020 06:15 )   PT: 11.6 sec;   INR: 0.97 ratio    PTT - ( 2020 06:15 )  PTT:30.1 sec      Imaging:    < from: NM SPECT/CT Inflammatory Loc, Single Area Single Day (20 @ 11:02) >  EXAM:  NM INFLAMM LOC WBC WB SD                          EXAM:  NM MULTI DAY PROCEDURE                          EXAM:  NM BONE MARROW IMG MULT AREAS                          EXAM:  NM INFLAMM LOC SPECT CT SA SD                                PROCEDURE DATE:  2020          INTERPRETATION:  RADIOPHARMACEUTICAL: 0.5 mCi In-111 labeled autologous leukocytes, I.V.; 10 mCi Tc99m sulfur colloid    CLINICAL STATEMENT: 64-year-old male with right heel ulcer referred to evaluate for osteomyelitis.    TECHNIQUE:  Static images of the feet were obtained 24 hours following administration of radiolabeled leukocytes. The patient then was injected with Tc-99m sulfur colloid and images were repeated in the same projections using dual isotope acquisition mode, followed by dual isotope SPECT/CT.   The CT protocol was optimized for SPECT attenuation correction and to provide anatomic detail for localization of SPECT abnormalities. The CT protocol was not designed to produce and cannot replace state-of- the-art diagnostic CT images with specific imaging protocols for different body parts and indications.    FINDINGS:  There is congruent uptake of radiolabeled leukocytes and Tc-99m sulfur colloid in the right calcaneus.  No abnormal activity is identified in the right heel region to suggest the presence of osteomyelitis.    There is also increased Tc 99m sulfur colloid activity in the midfoot, and metatarsal joints raising the possibility of neuropathic joint.    IMPRESSION: Combined Indium-111labeled leukocyte study and marrow scan demonstrates:    No scan evidence of osteomyelitis in the right heel.    Findings raise the possibility of neuropathic joint in the right midfoot.            NAHUM LAZAR MD; Attending Nuclear Medicine  This document has been electronically signed. 2020 11:38AM

## 2020-11-25 NOTE — PROGRESS NOTE ADULT - ASSESSMENT
s/p liver transplant  osteomyelitis     cont regular diet  IV antibiotics per ID  rejection regimen per transplant service  oob as tolerated  podiatry recs noted; follow-up biopsy

## 2020-11-25 NOTE — PROGRESS NOTE ADULT - SUBJECTIVE AND OBJECTIVE BOX
INTERVAL HPI/OVERNIGHT EVENTS:    patient seen and examined  s/p R heel wound debridement with bone biopsy   tolerating diet without nausea, vomiting, abdominal pain     MEDICATIONS  (STANDING):  aspirin enteric coated 81 milliGRAM(s) Oral daily  cefepime   IVPB 2000 milliGRAM(s) IV Intermittent every 12 hours  collagenase Ointment 1 Application(s) Topical daily  Dakins Solution - 1/2 Strength 1 Application(s) Topical daily  dextrose 40% Gel 15 Gram(s) Oral once  dextrose 5%. 1000 milliLiter(s) (50 mL/Hr) IV Continuous <Continuous>  dextrose 5%. 1000 milliLiter(s) (100 mL/Hr) IV Continuous <Continuous>  dextrose 5%. 1000 milliLiter(s) (100 mL/Hr) IV Continuous <Continuous>  dextrose 50% Injectable 25 Gram(s) IV Push once  dextrose 50% Injectable 12.5 Gram(s) IV Push once  dextrose 50% Injectable 25 Gram(s) IV Push once  everolimus (ZORTRESS) 2 milliGRAM(s) Oral <User Schedule>  furosemide   Injectable 20 milliGRAM(s) IV Push daily  glucagon  Injectable 1 milliGRAM(s) IntraMuscular once  insulin glargine Injectable (LANTUS) 8 Unit(s) SubCutaneous at bedtime  insulin lispro (ADMELOG) corrective regimen sliding scale   SubCutaneous every 6 hours  insulin lispro Injectable (ADMELOG) 10 Unit(s) SubCutaneous three times a day before meals  magnesium oxide 800 milliGRAM(s) Oral with breakfast  pantoprazole    Tablet 40 milliGRAM(s) Oral before breakfast  predniSONE   Tablet 5 milliGRAM(s) Oral daily  tamsulosin 0.4 milliGRAM(s) Oral at bedtime  trimethoprim   80 mG/sulfamethoxazole 400 mG 1 Tablet(s) Oral daily  valGANciclovir 450 milliGRAM(s) Oral daily  vancomycin  IVPB 1250 milliGRAM(s) IV Intermittent daily    MEDICATIONS  (PRN):  acetaminophen   Tablet .. 650 milliGRAM(s) Oral every 6 hours PRN Temp greater or equal to 38C (100.4F), Mild Pain (1 - 3)      Allergies    codeine (Anaphylaxis)    Intolerances        Review of Systems:    General:  No wt loss, fevers, chills, night sweats,fatigue,   Eyes:  Good vision, no reported pain  ENT:  No sore throat, pain, runny nose, dysphagia  CV:  No pain, palpitatioins, hypo/hypertension  Resp:  No dyspnea, cough, tachypnea, wheezing  GI:  No pain, No nausea, No vomiting, No diarrhea, No constipatiion, No weight loss, No fever, No pruritis, No rectal bleeding, No tarry stools, No dysphagia,  :  No pain, bleeding, incontinence, nocturia  Muscle:  No pain, weakness  Neuro:  No weakness, tingling, memory problems  Psych:  No fatigue, insomnia, mood problems, depression  Endocrine:  No polyuria, polydypsia, cold/heat intolerance  Heme:  No petechiae, ecchymosis, easy bruisability  Skin:  No rash, tattoos, scars, edema      Vital Signs Last 24 Hrs  T(C): 37 (24 Nov 2020 06:31), Max: 37 (24 Nov 2020 01:00)  T(F): 98.6 (24 Nov 2020 06:31), Max: 98.6 (24 Nov 2020 01:00)  HR: 75 (24 Nov 2020 06:31) (75 - 87)  BP: 119/63 (24 Nov 2020 06:31) (119/63 - 150/68)  BP(mean): --  RR: 18 (24 Nov 2020 06:31) (18 - 18)  SpO2: 98% (24 Nov 2020 06:31) (98% - 100%)    PHYSICAL EXAM:    Constitutional: NAD  HEENT: EOMI, throat clear  Neck: No LAD, supple  Gastrointestinal: BS+, obese, soft, NT/ND  Extremities: + edema, R foot wrap   Neurological: A/O x 3, no focal deficits        LABS:                        8.9    2.16  )-----------( 160      ( 24 Nov 2020 06:15 )             27.7     11-23    140  |  106  |  25<H>  ----------------------------<  145<H>  4.4   |  25  |  1.44<H>    Ca    8.9      23 Nov 2020 05:58  Phos  3.2     11-24  Mg     2.0     11-24    TPro  5.8<L>  /  Alb  3.2<L>  /  TBili  0.1<L>  /  DBili  x   /  AST  15  /  ALT  15  /  AlkPhos  90  11-23    PT/INR - ( 24 Nov 2020 06:15 )   PT: 11.6 sec;   INR: 0.97 ratio         PTT - ( 24 Nov 2020 06:15 )  PTT:30.1 sec      RADIOLOGY & ADDITIONAL TESTS:

## 2020-11-25 NOTE — PROGRESS NOTE ADULT - ASSESSMENT
64 year old man type 2 diabetes, HLD, GERD, HFpEF with mild LV diastolic dysfunction, and decompensated JEAN cirrhosis, duodenal ulcer, GAVE and duodenal AVM s/p APC (last on 10/11/19) and SHENG who is now s/p liver transplant (7/2/20) with post-op course c/b tacrolimus toxicity and hospital delirium improving after stopping CNI and replacing w/ everolimus. Patient now presents 11/20/20 with worsening LE swelling and right foot wound concerning for OM however no focus on infection on NM WBC scan 11/24/20 or evidence of bone infection intraoperatively.    # Right heel wound - Chronic non-healing right foot ulcer with probe to bone; suspected poor wound healing in setting of uncontrolled DM2 and immunosuppression. MRI R. foot 11/2 limited study; wound culture 11/21 growing Pseudomonas; no focus on infection on NM WBC scan 11/24/20; no evidence of bone or soft tissue infection intraoperative per Podiatry. Bone biopsy culture - pending.     # S/p liver transplant 7/2/20: Transaminases and markers of synthetic function are normal with minimal immunosuppression.  Recipient Info:   ABO: A  CMV:  Neg  EBV Positive  Donor:  Donor ID:  [id number] Match: [id number]  Age: 29 ABO:  A1 High Risk:   No COD: Cardiovascular  Anti CMV positive, EBV IgG- positive  HepBcAb-neg, Hepatitis C-DANA- neg, Hepatitis C ab-neg    # Sacral decubitus ulcer: stage II without signs of infection   # Type 2 diabetes:  A1c 8.1% and blood sugars elevated above goal -likely contributing to poor wound healing   # SHENG     Recommendations:  - follow up bone biopsy culture  - meropenem course for positive wound culture as per Transplant ID  - Bactrim SS PO daily and valganciclovir 450mg daily for prophylaxis as per Transplant Surgery  - continue everolimus dosing by trough (currently 2mg q12); follow-up trough level, goal 3 ng/mL  - continue to hold MMF for active infection  - continue prednisone 5mg qd  - continue Lasix 20mg IV daily (equivalent to home Lasix 40 PO)  - daily CMP and INR for MELD-Na  - Transplant Hep to follow      Vivian Guardado PGY-5  Gastroenterology Fellow  Page #[phone]   Page #[phone] 5pm-7am on weekdays, and on weekends

## 2020-11-25 NOTE — PROGRESS NOTE ADULT - SUBJECTIVE AND OBJECTIVE BOX
Patient is a 64y old  Male who presents with a chief complaint of R heel wound (25 Nov 2020 09:38)       INTERVAL HPI/OVERNIGHT EVENTS:  Patient seen and evaluated at bedside.  Pt is resting comfortable in NAD. Denies N/V/F/C.      Allergies    codeine (Anaphylaxis)    Intolerances        Vital Signs Last 24 Hrs  T(C): 36.8 (25 Nov 2020 05:05), Max: 36.8 (25 Nov 2020 01:00)  T(F): 98.2 (25 Nov 2020 05:05), Max: 98.3 (25 Nov 2020 01:00)  HR: 72 (25 Nov 2020 05:05) (69 - 85)  BP: 130/68 (25 Nov 2020 05:05) (116/61 - 153/65)  BP(mean): 102 (24 Nov 2020 18:30) (82 - 103)  RR: 18 (25 Nov 2020 05:05) (16 - 18)  SpO2: 99% (25 Nov 2020 05:05) (99% - 100%)    LABS:                        9.0    2.62  )-----------( 174      ( 25 Nov 2020 06:14 )             28.4     11-25    140  |  105  |  31<H>  ----------------------------<  132<H>  4.4   |  26  |  1.36<H>    Ca    8.6      25 Nov 2020 06:12  Phos  3.4     11-25  Mg     2.0     11-25    TPro  5.8<L>  /  Alb  3.2<L>  /  TBili  0.1<L>  /  DBili  x   /  AST  23  /  ALT  20  /  AlkPhos  92  11-25    PT/INR - ( 24 Nov 2020 06:15 )   PT: 11.6 sec;   INR: 0.97 ratio         PTT - ( 24 Nov 2020 06:15 )  PTT:30.1 sec    CAPILLARY BLOOD GLUCOSE      POCT Blood Glucose.: 146 mg/dL (25 Nov 2020 08:12)  POCT Blood Glucose.: 136 mg/dL (25 Nov 2020 05:59)  POCT Blood Glucose.: 231 mg/dL (25 Nov 2020 00:26)  POCT Blood Glucose.: 167 mg/dL (24 Nov 2020 21:39)  POCT Blood Glucose.: 167 mg/dL (24 Nov 2020 19:37)  POCT Blood Glucose.: 142 mg/dL (24 Nov 2020 18:46)  POCT Blood Glucose.: 172 mg/dL (24 Nov 2020 12:36)      Lower Extremity Physical Exam:  Vascular: DP/PT non-palpable 2/2 edema R foot, L foot unna boot left clean dry and intact, CFT <3 seconds B/L, Temperature gradient R warm to warm  Neuro: Epicritic sensation intact to the level of heel, B/L.    s/p Right heel wound debridement with Stravix graft application and bone biopsy (DOS 11/24/20)  Right foot surgical wound stable with graft intact with secured adaptic, no hematoma/seroma, no drainage, no signs of infection    RADIOLOGY & ADDITIONAL TESTS:

## 2020-11-25 NOTE — PROGRESS NOTE ADULT - SUBJECTIVE AND OBJECTIVE BOX
Follow Up:      Interval History/ROS:Patient is a 64y old  Male who presents with a chief complaint of R heel wound (24 Nov 2020 17:52)      Allergies    codeine (Anaphylaxis)    Intolerances        ANTIMICROBIALS:  cefepime   IVPB 2000 every 12 hours  trimethoprim   80 mG/sulfamethoxazole 400 mG 1 daily  valGANciclovir 450 daily  vancomycin  IVPB 1250 daily      OTHER MEDS:  acetaminophen   Tablet .. 650 milliGRAM(s) Oral every 6 hours PRN  acetaminophen   Tablet .. 650 milliGRAM(s) Oral every 6 hours PRN  aspirin enteric coated 81 milliGRAM(s) Oral daily  collagenase Ointment 1 Application(s) Topical daily  dextrose 40% Gel 15 Gram(s) Oral once  dextrose 5%. 1000 milliLiter(s) IV Continuous <Continuous>  dextrose 5%. 1000 milliLiter(s) IV Continuous <Continuous>  dextrose 5%. 1000 milliLiter(s) IV Continuous <Continuous>  dextrose 50% Injectable 25 Gram(s) IV Push once  dextrose 50% Injectable 12.5 Gram(s) IV Push once  dextrose 50% Injectable 25 Gram(s) IV Push once  everolimus (ZORTRESS) 2 milliGRAM(s) Oral <User Schedule>  furosemide   Injectable 20 milliGRAM(s) IV Push daily  glucagon  Injectable 1 milliGRAM(s) IntraMuscular once  insulin glargine Injectable (LANTUS) 8 Unit(s) SubCutaneous at bedtime  insulin lispro (ADMELOG) corrective regimen sliding scale   SubCutaneous every 6 hours  insulin lispro Injectable (ADMELOG) 10 Unit(s) SubCutaneous three times a day before meals  magnesium oxide 800 milliGRAM(s) Oral with breakfast  pantoprazole    Tablet 40 milliGRAM(s) Oral before breakfast  predniSONE   Tablet 5 milliGRAM(s) Oral daily  tamsulosin 0.4 milliGRAM(s) Oral at bedtime      Vital Signs Last 24 Hrs  T(C): 36.8 (25 Nov 2020 05:05), Max: 36.8 (25 Nov 2020 01:00)  T(F): 98.2 (25 Nov 2020 05:05), Max: 98.3 (25 Nov 2020 01:00)  HR: 72 (25 Nov 2020 05:05) (69 - 85)  BP: 130/68 (25 Nov 2020 05:05) (116/61 - 153/65)  BP(mean): 102 (24 Nov 2020 18:30) (82 - 103)  RR: 18 (25 Nov 2020 05:05) (16 - 18)  SpO2: 99% (25 Nov 2020 05:05) (99% - 100%)      EXAM:  Constitutional: Not in acute distress  Eyes: No icterus.   Oral cavity: Clear, no lesions  Neck: No neck vein distension noted  RS: Chest clear to auscultation bilaterally. No wheeze/rhonchi/crepitations.  CVS: S1, S2 heard. Regular rate and rhythm. No murmurs/rubs/gallops.  Abdomen: Soft. No guarding/rigidity/tenderness.  : No acute abnormalities  Extremities: Warm. No pedal edema  Skin: + sacral wound without purulence or surrounding erythema, + bandaged right foot   Vascular: No evidence of phlebitis  Neuro: Alert, oriented to time/place/person                        9.0    2.62  )-----------( 174      ( 25 Nov 2020 06:14 )             28.4       11-25    140  |  105  |  31<H>  ----------------------------<  132<H>  4.4   |  26  |  1.36<H>    Ca    8.6      25 Nov 2020 06:12  Phos  3.4     11-25  Mg     2.0     11-25    TPro  5.8<L>  /  Alb  3.2<L>  /  TBili  0.1<L>  /  DBili  x   /  AST  23  /  ALT  20  /  AlkPhos  92  11-25          MICROBIOLOGY:    Culture Results:   Few Pseudomonas aeruginosa  Normal skin ninfa isolated (11-21 @ 01:03)  Culture Results:   No growth to date. (11-20 @ 23:02)  Culture Results:   No growth to date. (11-20 @ 23:02)        RADIOLOGY:    <The imaging below has been reviewed and visualized by me independently. Findings as detailed in report below>    < from: NM SPECT/CT Inflammatory Loc, Single Area Single Day (11.24.20 @ 11:02) >  FINDINGS:  There is congruent uptake of radiolabeled leukocytes and Tc-99m sulfur colloid in the right calcaneus.  No abnormal activity is identified in the right heel region to suggest the presence of osteomyelitis.    There is also increased Tc 99m sulfur colloid activity in the midfoot, and metatarsal joints raising the possibility of neuropathic joint.    IMPRESSION: Combined Indium-111labeled leukocyte study and marrow scan demonstrates:    No scan evidence of osteomyelitis in the right heel.    Findings raise the possibility of neuropathic joint in the right midfoot.    < end of copied text >    < from: Xray Chest PA/Lat Pre Surgical Testing 2 Views (11.23.20 @ 19:30) >  EXAM:  XR CHEST PA LAT PST 2V                            PROCEDURE DATE:  11/23/2020        INTERPRETATION:  PA and lateral projection of the chest was obtained on November 23, 2020.    Indication:    Impression:    The heart is normal in size. The lungs are clear. No pleural effusion. No pneumothorax. Degenerative changes of the thoracic spine.    < end of copied text >    < from: MR Foot No Cont, Right (11.21.20 @ 12:58) >    FINDINGS:    The study is markedly limited as there are only 3 sequences which are motion degraded.    There appears to be soft tissue ulceration of the plantar aspect of the calcaneus with bone marrow edema within thesubcortical bone of the lateral aspect of the posterior calcaneus without definitive low T1 signal. This may represent early osteomyelitis. There is marked bone marrow edema at the plantar aspect of the talus laterally with associated low T1 signal. This appears to be degenerative in nature but is difficult to evaluate given the lack of multiplanar imaging.    Mild midfoot arthrosis is present at the TMT joints.    There is marked dorsal subcutaneous soft tissue edema throughout the midfoot and forefoot. Circumferential subcutaneous soft tissue edema seen at the ankle.    IMPRESSION: Markedly limited study.    Possible early osteomyelitis at the plantar and lateral aspect of the calcaneus with adjacent soft tissue ulceration.    Repeat study is recommended when the patient is able to complete entire study.      < end of copied text >       Follow Up:  Patient is a 64y old  Male who presents with a chief complaint of R heel wound (24 Nov 2020 17:52)      Interval History/ROS: afebrile. s/p right foot heel incision and drainage w/ application of stravix and bone biopsy on 11/24. notes some pain at the right heel.     Allergies    codeine (Anaphylaxis)    Intolerances        ANTIMICROBIALS:  cefepime   IVPB 2000 every 12 hours  trimethoprim   80 mG/sulfamethoxazole 400 mG 1 daily  valGANciclovir 450 daily  vancomycin  IVPB 1250 daily      OTHER MEDS:  acetaminophen   Tablet .. 650 milliGRAM(s) Oral every 6 hours PRN  acetaminophen   Tablet .. 650 milliGRAM(s) Oral every 6 hours PRN  aspirin enteric coated 81 milliGRAM(s) Oral daily  collagenase Ointment 1 Application(s) Topical daily  dextrose 40% Gel 15 Gram(s) Oral once  dextrose 5%. 1000 milliLiter(s) IV Continuous <Continuous>  dextrose 5%. 1000 milliLiter(s) IV Continuous <Continuous>  dextrose 5%. 1000 milliLiter(s) IV Continuous <Continuous>  dextrose 50% Injectable 25 Gram(s) IV Push once  dextrose 50% Injectable 12.5 Gram(s) IV Push once  dextrose 50% Injectable 25 Gram(s) IV Push once  everolimus (ZORTRESS) 2 milliGRAM(s) Oral <User Schedule>  furosemide   Injectable 20 milliGRAM(s) IV Push daily  glucagon  Injectable 1 milliGRAM(s) IntraMuscular once  insulin glargine Injectable (LANTUS) 8 Unit(s) SubCutaneous at bedtime  insulin lispro (ADMELOG) corrective regimen sliding scale   SubCutaneous every 6 hours  insulin lispro Injectable (ADMELOG) 10 Unit(s) SubCutaneous three times a day before meals  magnesium oxide 800 milliGRAM(s) Oral with breakfast  pantoprazole    Tablet 40 milliGRAM(s) Oral before breakfast  predniSONE   Tablet 5 milliGRAM(s) Oral daily  tamsulosin 0.4 milliGRAM(s) Oral at bedtime      Vital Signs Last 24 Hrs  T(C): 36.8 (25 Nov 2020 05:05), Max: 36.8 (25 Nov 2020 01:00)  T(F): 98.2 (25 Nov 2020 05:05), Max: 98.3 (25 Nov 2020 01:00)  HR: 72 (25 Nov 2020 05:05) (69 - 85)  BP: 130/68 (25 Nov 2020 05:05) (116/61 - 153/65)  BP(mean): 102 (24 Nov 2020 18:30) (82 - 103)  RR: 18 (25 Nov 2020 05:05) (16 - 18)  SpO2: 99% (25 Nov 2020 05:05) (99% - 100%)      EXAM:  Constitutional: Not in acute distress  Eyes: No icterus.   Oral cavity: Clear, no lesions  Neck: No neck vein distension noted  RS: Chest clear to auscultation bilaterally. No wheeze/rhonchi/crepitations.  CVS: S1, S2 heard. Regular rate and rhythm. No murmurs/rubs/gallops.  Abdomen: Soft. No guarding/rigidity/tenderness.  : No acute abnormalities  Extremities: Warm. No pedal edema  Skin: + sacral wound without purulence or surrounding erythema, + bandaged right foot   Vascular: No evidence of phlebitis  Neuro: Alert, oriented to time/place/person                        9.0    2.62  )-----------( 174      ( 25 Nov 2020 06:14 )             28.4       11-25    140  |  105  |  31<H>  ----------------------------<  132<H>  4.4   |  26  |  1.36<H>    Ca    8.6      25 Nov 2020 06:12  Phos  3.4     11-25  Mg     2.0     11-25    TPro  5.8<L>  /  Alb  3.2<L>  /  TBili  0.1<L>  /  DBili  x   /  AST  23  /  ALT  20  /  AlkPhos  92  11-25          MICROBIOLOGY:    Culture Results:   Few Pseudomonas aeruginosa  Normal skin ninfa isolated (11-21 @ 01:03)  Culture Results:   No growth to date. (11-20 @ 23:02)  Culture Results:   No growth to date. (11-20 @ 23:02)        RADIOLOGY:    <The imaging below has been reviewed and visualized by me independently. Findings as detailed in report below>    < from: NM SPECT/CT Inflammatory Loc, Single Area Single Day (11.24.20 @ 11:02) >  FINDINGS:  There is congruent uptake of radiolabeled leukocytes and Tc-99m sulfur colloid in the right calcaneus.  No abnormal activity is identified in the right heel region to suggest the presence of osteomyelitis.    There is also increased Tc 99m sulfur colloid activity in the midfoot, and metatarsal joints raising the possibility of neuropathic joint.    IMPRESSION: Combined Indium-111labeled leukocyte study and marrow scan demonstrates:    No scan evidence of osteomyelitis in the right heel.    Findings raise the possibility of neuropathic joint in the right midfoot.    < end of copied text >    < from: Xray Chest PA/Lat Pre Surgical Testing 2 Views (11.23.20 @ 19:30) >  EXAM:  XR CHEST PA LAT PST 2V                            PROCEDURE DATE:  11/23/2020        INTERPRETATION:  PA and lateral projection of the chest was obtained on November 23, 2020.    Indication:    Impression:    The heart is normal in size. The lungs are clear. No pleural effusion. No pneumothorax. Degenerative changes of the thoracic spine.    < end of copied text >    < from: MR Foot No Cont, Right (11.21.20 @ 12:58) >    FINDINGS:    The study is markedly limited as there are only 3 sequences which are motion degraded.    There appears to be soft tissue ulceration of the plantar aspect of the calcaneus with bone marrow edema within thesubcortical bone of the lateral aspect of the posterior calcaneus without definitive low T1 signal. This may represent early osteomyelitis. There is marked bone marrow edema at the plantar aspect of the talus laterally with associated low T1 signal. This appears to be degenerative in nature but is difficult to evaluate given the lack of multiplanar imaging.    Mild midfoot arthrosis is present at the TMT joints.    There is marked dorsal subcutaneous soft tissue edema throughout the midfoot and forefoot. Circumferential subcutaneous soft tissue edema seen at the ankle.    IMPRESSION: Markedly limited study.    Possible early osteomyelitis at the plantar and lateral aspect of the calcaneus with adjacent soft tissue ulceration.    Repeat study is recommended when the patient is able to complete entire study.      < end of copied text >

## 2020-11-25 NOTE — PROGRESS NOTE ADULT - ATTENDING COMMENTS
64M post OLT 7/2/20, admitted with non-healing right heal ulcer/osteomyelitis  - MRI c/w early OM, WCC scan negative.  - S/p OR for right heal debridement 11/24  - 11/21 Wound culture growing few pseudomonas.      Plan  Continue Cefepime and Vanco pending C+S from surgical specimen.   ID opinion re: ongoing ABx as outpatient  Everolimus 2/2, MMF on hold in setting of infection, Pred 5  PPx: bactrim/valcyte (currently on 450mg QD decreased from 900 QD 2/2 low WBC)

## 2020-11-25 NOTE — PROGRESS NOTE ADULT - SUBJECTIVE AND OBJECTIVE BOX
Present:   Patient seen and examined with multidisciplinary team including Transplant Surgeon: Dr. Gray, Dr. Ackerman,  Transplant Nephrologist: Dr. Salvador,  Transplant hepatology Dr. Rose, hepatology fellow, Renal Fellow, PGY3 Dr. Laurent, Pharmacist: Ancelmo Blanca, Alpesh Richardson and Gualberto and unit RN during am rounds.  Disciplines not in attendance will be notified of the plan.     HPI: 64M with IDDM, HLD, obesity, HFpEF with mild LV diastolic dysfunction, MGUS, chronic anemia with a history of duodenal ulcer 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  Known history of non healing R heel ulcer. Followed by podiatry outpatient Dr. Patel, had recent debridement wound (10/20) cx grew ec faecalis, pseudomonas, enterococcus. Tx with cipro.  No improvement post debridement and PO antibiotic treatment.    Admitted with non healing R heel ulcer. MRI c/w early OM. Podiatry and ID consulted. Wound cultured. Broadened to meropenem.   MMF held. Everolimus dose reduced.     Interval Events:   - afebrile, stable VSS  - S/p right heal debridement yesterday, bone felt healthy, biopsy taken.   - Podiatry following: wound dressing changes   - 11/21 Wound cultures: pseudomonas, sensitivities pending      On Meropenem  - WBC scan negative    Potential Discharge date: Possibly today     Education:  Medications    Plan of care:  See Below  MEDICATIONS  (STANDING):  aspirin enteric coated 81 milliGRAM(s) Oral daily  cefepime   IVPB 2000 milliGRAM(s) IV Intermittent every 12 hours  collagenase Ointment 1 Application(s) Topical daily  dextrose 40% Gel 15 Gram(s) Oral once  dextrose 5%. 1000 milliLiter(s) (50 mL/Hr) IV Continuous <Continuous>  dextrose 5%. 1000 milliLiter(s) (100 mL/Hr) IV Continuous <Continuous>  dextrose 5%. 1000 milliLiter(s) (100 mL/Hr) IV Continuous <Continuous>  dextrose 50% Injectable 25 Gram(s) IV Push once  dextrose 50% Injectable 12.5 Gram(s) IV Push once  dextrose 50% Injectable 25 Gram(s) IV Push once  everolimus (ZORTRESS) 2 milliGRAM(s) Oral <User Schedule>  furosemide   Injectable 20 milliGRAM(s) IV Push daily  glucagon  Injectable 1 milliGRAM(s) IntraMuscular once  insulin glargine Injectable (LANTUS) 8 Unit(s) SubCutaneous at bedtime  insulin lispro (ADMELOG) corrective regimen sliding scale   SubCutaneous every 6 hours  insulin lispro Injectable (ADMELOG) 10 Unit(s) SubCutaneous three times a day before meals  magnesium oxide 800 milliGRAM(s) Oral with breakfast  pantoprazole    Tablet 40 milliGRAM(s) Oral before breakfast  predniSONE   Tablet 5 milliGRAM(s) Oral daily  tamsulosin 0.4 milliGRAM(s) Oral at bedtime  trimethoprim   80 mG/sulfamethoxazole 400 mG 1 Tablet(s) Oral daily  valGANciclovir 450 milliGRAM(s) Oral daily  vancomycin  IVPB 1250 milliGRAM(s) IV Intermittent daily    MEDICATIONS  (PRN):  acetaminophen   Tablet .. 650 milliGRAM(s) Oral every 6 hours PRN Temp greater or equal to 38C (100.4F), Mild Pain (1 - 3)  acetaminophen   Tablet .. 650 milliGRAM(s) Oral every 6 hours PRN Moderate Pain (4 - 6)  PAST MEDICAL & SURGICAL HISTORY:  GIB (gastrointestinal bleeding)    GERD with esophagitis  Gastritis &amp; Non Bleeding Ulcers    Hepatic encephalopathy    Obesity    Fatty liver disease, nonalcoholic    Renal stones  25 years ago    Hypertension    Neuropathy    Hypercholesteremia    Diabetes    S/P cholecystectomy      Vital Signs Last 24 Hrs  T(C): 36.8 (25 Nov 2020 05:05), Max: 36.8 (25 Nov 2020 01:00)  T(F): 98.2 (25 Nov 2020 05:05), Max: 98.3 (25 Nov 2020 01:00)  HR: 72 (25 Nov 2020 05:05) (69 - 85)  BP: 130/68 (25 Nov 2020 05:05) (116/61 - 153/65)  BP(mean): 102 (24 Nov 2020 18:30) (82 - 103)  RR: 18 (25 Nov 2020 05:05) (16 - 18)  SpO2: 99% (25 Nov 2020 05:05) (99% - 100%)    I&O's Detail    24 Nov 2020 07:01  -  25 Nov 2020 07:00  --------------------------------------------------------  IN:    IV PiggyBack: 250 mL    Oral Fluid: 60 mL  Total IN: 310 mL    OUT:    Estimated Blood Loss (mL): 0 mL    Voided (mL): 2975 mL  Total OUT: 2975 mL    Total NET: -2665 mL      25 Nov 2020 07:01  -  25 Nov 2020 10:17  --------------------------------------------------------  IN:  Total IN: 0 mL    OUT:    Voided (mL): 950 mL  Total OUT: 950 mL    Total NET: -950 mL                            9.0    2.62  )-----------( 174      ( 25 Nov 2020 06:14 )             28.4     11-25    140  |  105  |  31<H>  ----------------------------<  132<H>  4.4   |  26  |  1.36<H>    Ca    8.6      25 Nov 2020 06:12  Phos  3.4     11-25  Mg     2.0     11-25    TPro  5.8<L>  /  Alb  3.2<L>  /  TBili  0.1<L>  /  DBili  x   /  AST  23  /  ALT  20  /  AlkPhos  92  11-25        Culture - Other (collected 11-21-20 @ 01:03)  Source: Skin wound  11-25    140  |  105  |  31<H>  ----------------------------<  132<H>  4.4   |  26  |  1.36<H>    Ca    8.6      25 Nov 2020 06:12  Phos  3.4     11-25  Mg     2.0     11-25    TPro  5.8<L>  /  Alb  3.2<L>  /  TBili  0.1<L>  /  DBili  x   /  AST  23  /  ALT  20  /  AlkPhos  92  11-25  Final Report (11-23-20 @ 11:16):    Few Pseudomonas aeruginosa    Normal skin ninfa isolated  Organism: Pseudomonas aeruginosa (11-23-20 @ 11:16)  Organism: Pseudomonas aeruginosa (11-23-20 @ 11:16)    Culture - Blood (collected 11-20-20 @ 23:02)  Source: .Blood Blood-Venous  Preliminary Report (11-22-20 @ 01:02):    No growth to date.    Culture - Blood (collected 11-20-20 @ 23:02)  Source: .Blood Blood-Peripheral  Preliminary Report (11-22-20 @ 01:02):    No growth to date.        Review of systems  Gen: No weight changes, fatigue, fevers/chills, weakness  Skin: R heel wound  Head/Eyes/Ears/Mouth: No headache; Normal hearing; Normal vision w/o blurriness; No sinus pain/discomfort, sore throat  Respiratory: No dyspnea, cough, wheezing, hemoptysis  CV: No chest pain, PND, orthopnea  GI: no abdominal pain  : No increased frequency, dysuria, hematuria, nocturia  MSK: No joint pain/swelling; no back pain; + LE edema  Neuro: No dizziness/lightheadedness, weakness, seizures, numbness, tingling  Heme: No easy bruising or bleeding  Endo: No heat/cold intolerance  Psych: No significant nervousness, anxiety, stress, depression  All other systems were reviewed and are negative, except as noted.    Physical Exam:   GENERAL:  No acute distress  HEENT:  Normocephalic/atraumatic, no scleral icterus  CHEST:  Clear to auscultation bilaterally, no wheezes/rales/ronchi, no accessory muscle use  HEART:  Regular rate and rhythm, no murmurs/rubs/gallops  ABDOMEN:  Soft, non-tender, non-distended,  well healed incision  EXTREMITIES: RLE with large 4x4 cm ulcer, with overlying granulation tissue and surrounding erythema, some visible bone. +1 BLE edema.  SKIN:  No rash/erythema/ecchymoses/petechiae/wounds/abscess/warm/dry  NEURO:  Alert and oriented x 3, no asterixis      Assessment and Plan:   · Assessment	  64M with IDDM, HLD, obesity, HFpEF with mild LV diastolic dysfunction, MGUS, chronic anemia with a history of duodenal ulcer 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  Known history of non healing R heel ulcer. Followed by podiatry outpatient Dr. Patel, had recent debridement wound (10/20) cx grew ec faecalis, pseudomonas, enterococcus. Tx with Cipro without improvement now admitted for IV antibiotics and further intervention from podiatry    [] R heel ulcer/Osteomyelitis  - MRI c/w early OM, Tagged WCC scan negative.  - S/p OR for right heal debridement 11/24  - 11/21 Wound culture growing few pseudomonas.  Continue Cefepime and Vanco pending C+S from surgical specimen. Pending outpatient PO recs.   - Physical therapy eval    [] s/p OLT with good graft function   - Immuno: Everolimus 2/2, MMF on hold in setting of infection, Pred 5  - PPx: bactrim/valcyte (currently on 450mg QD decreased from 900 QD 2/2 low WBC)  - Daily CBC, BMP  - Continue Lasix 20mg IV daily for peripheral edema      [] DM  - Glucose controlled  - Fingersticks ac and qhs  - Continue glargine 15u qhs and  lispro 10u with meals  - SSI coverage

## 2020-11-25 NOTE — PROGRESS NOTE ADULT - ASSESSMENT
65 yo male patient s/p Right heel wound debridement with Stravix graft application and bone biopsy (DOS 11/24/20)  - Pt seen and evaluated   - Afebrile, no leukocytosis  - Right foot surgical wound stable with graft intact with secured adaptic, no hematoma/seroma, no drainage, no signs of infection  - WBAT to right toe touch in heel relief shoe  - Low concern for residual bone infection (hard bone intra-op) and viability  - Wait for OR Bone biopsy culture and final ID recs  - Follow up at wound care center upon discharge (info in paperwork)  - Discussed with attending

## 2020-11-26 LAB
ALBUMIN SERPL ELPH-MCNC: 3.1 G/DL — LOW (ref 3.3–5)
ALP SERPL-CCNC: 91 U/L — SIGNIFICANT CHANGE UP (ref 40–120)
ALT FLD-CCNC: 22 U/L — SIGNIFICANT CHANGE UP (ref 10–45)
ANION GAP SERPL CALC-SCNC: 11 MMOL/L — SIGNIFICANT CHANGE UP (ref 5–17)
AST SERPL-CCNC: 21 U/L — SIGNIFICANT CHANGE UP (ref 10–40)
BILIRUB SERPL-MCNC: 0.1 MG/DL — LOW (ref 0.2–1.2)
BUN SERPL-MCNC: 36 MG/DL — HIGH (ref 7–23)
CALCIUM SERPL-MCNC: 8.6 MG/DL — SIGNIFICANT CHANGE UP (ref 8.4–10.5)
CHLORIDE SERPL-SCNC: 106 MMOL/L — SIGNIFICANT CHANGE UP (ref 96–108)
CO2 SERPL-SCNC: 23 MMOL/L — SIGNIFICANT CHANGE UP (ref 22–31)
CREAT SERPL-MCNC: 1.31 MG/DL — HIGH (ref 0.5–1.3)
CULTURE RESULTS: SIGNIFICANT CHANGE UP
CULTURE RESULTS: SIGNIFICANT CHANGE UP
GLUCOSE SERPL-MCNC: 128 MG/DL — HIGH (ref 70–99)
HCT VFR BLD CALC: 27 % — LOW (ref 39–50)
HGB BLD-MCNC: 8.8 G/DL — LOW (ref 13–17)
MAGNESIUM SERPL-MCNC: 2.1 MG/DL — SIGNIFICANT CHANGE UP (ref 1.6–2.6)
MCHC RBC-ENTMCNC: 29.1 PG — SIGNIFICANT CHANGE UP (ref 27–34)
MCHC RBC-ENTMCNC: 32.6 GM/DL — SIGNIFICANT CHANGE UP (ref 32–36)
MCV RBC AUTO: 89.4 FL — SIGNIFICANT CHANGE UP (ref 80–100)
NRBC # BLD: 0 /100 WBCS — SIGNIFICANT CHANGE UP (ref 0–0)
PHOSPHATE SERPL-MCNC: 3.4 MG/DL — SIGNIFICANT CHANGE UP (ref 2.5–4.5)
PLATELET # BLD AUTO: 164 K/UL — SIGNIFICANT CHANGE UP (ref 150–400)
POTASSIUM SERPL-MCNC: 4.2 MMOL/L — SIGNIFICANT CHANGE UP (ref 3.5–5.3)
POTASSIUM SERPL-SCNC: 4.2 MMOL/L — SIGNIFICANT CHANGE UP (ref 3.5–5.3)
PROT SERPL-MCNC: 5.6 G/DL — LOW (ref 6–8.3)
RBC # BLD: 3.02 M/UL — LOW (ref 4.2–5.8)
RBC # FLD: 13.1 % — SIGNIFICANT CHANGE UP (ref 10.3–14.5)
SODIUM SERPL-SCNC: 140 MMOL/L — SIGNIFICANT CHANGE UP (ref 135–145)
SPECIMEN SOURCE: SIGNIFICANT CHANGE UP
SPECIMEN SOURCE: SIGNIFICANT CHANGE UP
WBC # BLD: 2.5 K/UL — LOW (ref 3.8–10.5)
WBC # FLD AUTO: 2.5 K/UL — LOW (ref 3.8–10.5)

## 2020-11-26 PROCEDURE — 99232 SBSQ HOSP IP/OBS MODERATE 35: CPT | Mod: GC,24

## 2020-11-26 PROCEDURE — 99232 SBSQ HOSP IP/OBS MODERATE 35: CPT

## 2020-11-26 RX ADMIN — Medication 1 APPLICATION(S): at 11:37

## 2020-11-26 RX ADMIN — Medication 1 TABLET(S): at 11:37

## 2020-11-26 RX ADMIN — EVEROLIMUS 2 MILLIGRAM(S): 10 TABLET ORAL at 20:24

## 2020-11-26 RX ADMIN — Medication 10 UNIT(S): at 08:12

## 2020-11-26 RX ADMIN — Medication 10 UNIT(S): at 17:29

## 2020-11-26 RX ADMIN — MAGNESIUM OXIDE 400 MG ORAL TABLET 800 MILLIGRAM(S): 241.3 TABLET ORAL at 08:13

## 2020-11-26 RX ADMIN — INSULIN GLARGINE 8 UNIT(S): 100 INJECTION, SOLUTION SUBCUTANEOUS at 21:47

## 2020-11-26 RX ADMIN — PANTOPRAZOLE SODIUM 40 MILLIGRAM(S): 20 TABLET, DELAYED RELEASE ORAL at 05:55

## 2020-11-26 RX ADMIN — Medication 650 MILLIGRAM(S): at 22:20

## 2020-11-26 RX ADMIN — Medication 10 UNIT(S): at 12:20

## 2020-11-26 RX ADMIN — Medication 2: at 17:30

## 2020-11-26 RX ADMIN — Medication 650 MILLIGRAM(S): at 21:47

## 2020-11-26 RX ADMIN — CEFEPIME 100 MILLIGRAM(S): 1 INJECTION, POWDER, FOR SOLUTION INTRAMUSCULAR; INTRAVENOUS at 17:24

## 2020-11-26 RX ADMIN — Medication 81 MILLIGRAM(S): at 11:37

## 2020-11-26 RX ADMIN — Medication 5 MILLIGRAM(S): at 05:55

## 2020-11-26 RX ADMIN — EVEROLIMUS 2 MILLIGRAM(S): 10 TABLET ORAL at 08:13

## 2020-11-26 RX ADMIN — Medication 6: at 21:47

## 2020-11-26 RX ADMIN — VALGANCICLOVIR 450 MILLIGRAM(S): 450 TABLET, FILM COATED ORAL at 11:37

## 2020-11-26 RX ADMIN — TAMSULOSIN HYDROCHLORIDE 0.4 MILLIGRAM(S): 0.4 CAPSULE ORAL at 21:47

## 2020-11-26 RX ADMIN — Medication 2: at 12:21

## 2020-11-26 RX ADMIN — CEFEPIME 100 MILLIGRAM(S): 1 INJECTION, POWDER, FOR SOLUTION INTRAMUSCULAR; INTRAVENOUS at 05:55

## 2020-11-26 RX ADMIN — Medication 20 MILLIGRAM(S): at 05:55

## 2020-11-26 NOTE — PROGRESS NOTE ADULT - SUBJECTIVE AND OBJECTIVE BOX
Present:   Patient seen and examined with multidisciplinary team including Transplant Surgeon: Dr. Ackerman,  Transplant Nephrologist: Dr. Salvador,  NP Flora Akers and unit RN during am rounds.  Disciplines not in attendance will be notified of the plan.     HPI: 64M with IDDM, HLD, obesity, HFpEF with mild LV diastolic dysfunction, MGUS, chronic anemia with a history of duodenal ulcer 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  Known history of non healing R heel ulcer. Followed by podiatry outpatient Dr. Patel, had recent debridement wound (10/20) cx grew ec faecalis, pseudomonas, enterococcus. Tx with cipro.  No improvement post debridement and PO antibiotic treatment.    Admitted with non healing R heel ulcer. MRI c/w early OM. Podiatry and ID consulted. Wound cultured. Broadened to meropenem.   MMF held. Everolimus dose reduced.     Interval Events:   - afebrile, stable VSS  - S/p right heal debridement 11/24, bone felt healthy, biopsy taken.  - Podiatry following: wound dressing changes   - 11/21 Wound cultures: pseudomonas,  currently on cefepime, Vanco d/cd  - OR cult NGTD      Potential Discharge date:     Education:  Medications    Plan of care:  See Below    MEDICATIONS  (STANDING):  aspirin enteric coated 81 milliGRAM(s) Oral daily  cefepime   IVPB 2000 milliGRAM(s) IV Intermittent every 12 hours  collagenase Ointment 1 Application(s) Topical daily  dextrose 40% Gel 15 Gram(s) Oral once  dextrose 5%. 1000 milliLiter(s) (50 mL/Hr) IV Continuous <Continuous>  dextrose 5%. 1000 milliLiter(s) (100 mL/Hr) IV Continuous <Continuous>  dextrose 5%. 1000 milliLiter(s) (100 mL/Hr) IV Continuous <Continuous>  dextrose 50% Injectable 25 Gram(s) IV Push once  dextrose 50% Injectable 12.5 Gram(s) IV Push once  dextrose 50% Injectable 25 Gram(s) IV Push once  everolimus (ZORTRESS) 2 milliGRAM(s) Oral <User Schedule>  furosemide   Injectable 20 milliGRAM(s) IV Push daily  glucagon  Injectable 1 milliGRAM(s) IntraMuscular once  insulin glargine Injectable (LANTUS) 8 Unit(s) SubCutaneous at bedtime  insulin lispro (ADMELOG) corrective regimen sliding scale   SubCutaneous Before meals and at bedtime  insulin lispro Injectable (ADMELOG) 10 Unit(s) SubCutaneous three times a day before meals  magnesium oxide 800 milliGRAM(s) Oral with breakfast  pantoprazole    Tablet 40 milliGRAM(s) Oral before breakfast  predniSONE   Tablet 5 milliGRAM(s) Oral daily  tamsulosin 0.4 milliGRAM(s) Oral at bedtime  trimethoprim   80 mG/sulfamethoxazole 400 mG 1 Tablet(s) Oral daily  valGANciclovir 450 milliGRAM(s) Oral daily    MEDICATIONS  (PRN):  acetaminophen   Tablet .. 650 milliGRAM(s) Oral every 6 hours PRN Moderate Pain (4 - 6)      PAST MEDICAL & SURGICAL HISTORY:  GIB (gastrointestinal bleeding)    GERD with esophagitis  Gastritis &amp; Non Bleeding Ulcers    Hepatic encephalopathy    Obesity    Fatty liver disease, nonalcoholic    Renal stones  25 years ago    Hypertension    Neuropathy    Hypercholesteremia    Diabetes    S/P cholecystectomy        Vital Signs Last 24 Hrs  T(C): 36.8 (26 Nov 2020 09:00), Max: 37.6 (26 Nov 2020 01:00)  T(F): 98.2 (26 Nov 2020 09:00), Max: 99.6 (26 Nov 2020 01:00)  HR: 80 (26 Nov 2020 09:00) (69 - 82)  BP: 122/65 (26 Nov 2020 09:00) (121/64 - 135/66)  BP(mean): --  RR: 18 (26 Nov 2020 09:00) (18 - 18)  SpO2: 99% (26 Nov 2020 09:00) (98% - 100%)    I&O's Summary    25 Nov 2020 07:01  -  26 Nov 2020 07:00  --------------------------------------------------------  IN: 650 mL / OUT: 3400 mL / NET: -2750 mL          LABS:                        8.8    2.50  )-----------( 164      ( 26 Nov 2020 06:02 )             27.0     11-26    140  |  106  |  36<H>  ----------------------------<  128<H>  4.2   |  23  |  1.31<H>    Ca    8.6      26 Nov 2020 06:02  Phos  3.4     11-26  Mg     2.1     11-26    TPro  5.6<L>  /  Alb  3.1<L>  /  TBili  0.1<L>  /  DBili  x   /  AST  21  /  ALT  22  /  AlkPhos  91  11-26        CAPILLARY BLOOD GLUCOSE      POCT Blood Glucose.: 153 mg/dL (26 Nov 2020 12:19)  POCT Blood Glucose.: 141 mg/dL (26 Nov 2020 08:08)  POCT Blood Glucose.: 186 mg/dL (25 Nov 2020 21:39)  POCT Blood Glucose.: 157 mg/dL (25 Nov 2020 17:21)    LIVER FUNCTIONS - ( 26 Nov 2020 06:02 )  Alb: 3.1 g/dL / Pro: 5.6 g/dL / ALK PHOS: 91 U/L / ALT: 22 U/L / AST: 21 U/L / GGT: x             Culture - Tissue with Gram Stain (collected 11-25-20 @ 00:55)  Source: .Tissue Other  Gram Stain (11-25-20 @ 04:24):    No polymorphonuclear leukocytes seen per low power field    No organisms seen per oil power field  Preliminary Report (11-25-20 @ 20:02):    No growth    Culture - Other (collected 11-21-20 @ 01:03)  Source: Skin wound  Final Report (11-23-20 @ 11:16):    Few Pseudomonas aeruginosa    Normal skin ninfa isolated  Organism: Pseudomonas aeruginosa (11-23-20 @ 11:16)  Organism: Pseudomonas aeruginosa (11-23-20 @ 11:16)    Culture - Blood (collected 11-20-20 @ 23:02)  Source: .Blood Blood-Venous  Final Report (11-26-20 @ 01:01):    No Growth Final    Culture - Blood (collected 11-20-20 @ 23:02)  Source: .Blood Blood-Peripheral  Final Report (11-26-20 @ 01:01):    No Growth Final          Cultures:    Culture - Tissue with Gram Stain (collected 11-25-20 @ 00:55)  Source: .Tissue Other  Gram Stain (11-25-20 @ 04:24):    No polymorphonuclear leukocytes seen per low power field    No organisms seen per oil power field  Preliminary Report (11-25-20 @ 20:02):    No growth    Culture - Other (collected 11-21-20 @ 01:03)  Source: Skin wound  Final Report (11-23-20 @ 11:16):    Few Pseudomonas aeruginosa    Normal skin ninfa isolated  Organism: Pseudomonas aeruginosa (11-23-20 @ 11:16)  Organism: Pseudomonas aeruginosa (11-23-20 @ 11:16)    Culture - Blood (collected 11-20-20 @ 23:02)  Source: .Blood Blood-Venous  Final Report (11-26-20 @ 01:01):    No Growth Final    Culture - Blood (collected 11-20-20 @ 23:02)  Source: .Blood Blood-Peripheral  Final Report (11-26-20 @ 01:01):    No Growth Final        Review of systems  Gen: No weight changes, fatigue, fevers/chills, weakness  Skin: R heel wound  Head/Eyes/Ears/Mouth: No headache; Normal hearing; Normal vision w/o blurriness; No sinus pain/discomfort, sore throat  Respiratory: No dyspnea, cough, wheezing, hemoptysis  CV: No chest pain, PND, orthopnea  GI: no abdominal pain  : No increased frequency, dysuria, hematuria, nocturia  MSK: No joint pain/swelling; no back pain; + LE edema  Neuro: No dizziness/lightheadedness, weakness, seizures, numbness, tingling  Heme: No easy bruising or bleeding  Endo: No heat/cold intolerance  Psych: No significant nervousness, anxiety, stress, depression  All other systems were reviewed and are negative, except as noted.    Physical Exam:   GENERAL:  No acute distress  HEENT:  Normocephalic/atraumatic, no scleral icterus  CHEST:  Clear to auscultation bilaterally, no wheezes/rales/ronchi, no accessory muscle use  HEART:  Regular rate and rhythm, no murmurs/rubs/gallops  ABDOMEN:  Soft, non-tender, non-distended,  well healed incision  EXTREMITIES: RLE with large 4x4 cm ulcer, with overlying granulation tissue and surrounding erythema, some visible bone. +1 BLE edema.  SKIN:  No rash/erythema/ecchymoses/petechiae/wounds/abscess/warm/dry  NEURO:  Alert and oriented x 3, no asterixis

## 2020-11-26 NOTE — PROGRESS NOTE ADULT - ASSESSMENT
Assessment and Plan:   · Assessment	  64M with IDDM, HLD, obesity, HFpEF with mild LV diastolic dysfunction, MGUS, chronic anemia with a history of duodenal ulcer 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  Known history of non healing R heel ulcer. Followed by podiatry outpatient Dr. Patel, had recent debridement wound (10/20) cx grew ec faecalis, pseudomonas, enterococcus. Tx with Cipro without improvement now admitted for IV antibiotics and further intervention from podiatry    [] R heel ulcer/Osteomyelitis  - MRI c/w early OM, Tagged WCC scan negative.  - S/p OR for right heal debridement 11/24  - 11/21 Wound culture growing few pseudomonas.  Continue Cefepime   pending C+S from surgical specimen. Pending outpatient PO recs.   - OR cult NGTD  - Physical therapy eval    [] s/p OLT with good graft function   - Immuno: Everolimus 2/2, MMF on hold in setting of infection, Pred 5  - PPx: bactrim/valcyte (currently on 450mg QD decreased from 900 QD 2/2 low WBC)  - Daily CBC, BMP  - Continue Lasix 20mg IV daily for peripheral edema      [] DM  - Glucose controlled  - Fingersticks ac and qhs  - Continue glargine 15u qhs and  lispro 10u with meals  - SSI coverage

## 2020-11-27 DIAGNOSIS — N20.0 CALCULUS OF KIDNEY: ICD-10-CM

## 2020-11-27 DIAGNOSIS — T14.8XXA OTHER INJURY OF UNSPECIFIED BODY REGION, INITIAL ENCOUNTER: ICD-10-CM

## 2020-11-27 DIAGNOSIS — N17.9 ACUTE KIDNEY FAILURE, UNSPECIFIED: ICD-10-CM

## 2020-11-27 DIAGNOSIS — Z94.4 LIVER TRANSPLANT STATUS: ICD-10-CM

## 2020-11-27 LAB
ALBUMIN SERPL ELPH-MCNC: 3.1 G/DL — LOW (ref 3.3–5)
ALP SERPL-CCNC: 111 U/L — SIGNIFICANT CHANGE UP (ref 40–120)
ALT FLD-CCNC: 27 U/L — SIGNIFICANT CHANGE UP (ref 10–45)
ANION GAP SERPL CALC-SCNC: 10 MMOL/L — SIGNIFICANT CHANGE UP (ref 5–17)
APPEARANCE UR: CLEAR — SIGNIFICANT CHANGE UP
AST SERPL-CCNC: 27 U/L — SIGNIFICANT CHANGE UP (ref 10–40)
BACTERIA # UR AUTO: NEGATIVE — SIGNIFICANT CHANGE UP
BASOPHILS # BLD AUTO: 0.03 K/UL — SIGNIFICANT CHANGE UP (ref 0–0.2)
BASOPHILS NFR BLD AUTO: 1.2 % — SIGNIFICANT CHANGE UP (ref 0–2)
BILIRUB SERPL-MCNC: <0.1 MG/DL — LOW (ref 0.2–1.2)
BILIRUB UR-MCNC: NEGATIVE — SIGNIFICANT CHANGE UP
BUN SERPL-MCNC: 39 MG/DL — HIGH (ref 7–23)
CALCIUM SERPL-MCNC: 8.6 MG/DL — SIGNIFICANT CHANGE UP (ref 8.4–10.5)
CHLORIDE SERPL-SCNC: 105 MMOL/L — SIGNIFICANT CHANGE UP (ref 96–108)
CO2 SERPL-SCNC: 23 MMOL/L — SIGNIFICANT CHANGE UP (ref 22–31)
COLOR SPEC: SIGNIFICANT CHANGE UP
CREAT ?TM UR-MCNC: 108 MG/DL — SIGNIFICANT CHANGE UP
CREAT SERPL-MCNC: 1.56 MG/DL — HIGH (ref 0.5–1.3)
DIFF PNL FLD: ABNORMAL
EOSINOPHIL # BLD AUTO: 0.07 K/UL — SIGNIFICANT CHANGE UP (ref 0–0.5)
EOSINOPHIL NFR BLD AUTO: 2.8 % — SIGNIFICANT CHANGE UP (ref 0–6)
EPI CELLS # UR: 2 /HPF — SIGNIFICANT CHANGE UP
GLUCOSE SERPL-MCNC: 273 MG/DL — HIGH (ref 70–99)
GLUCOSE UR QL: ABNORMAL
HCT VFR BLD CALC: 25.7 % — LOW (ref 39–50)
HGB BLD-MCNC: 8.4 G/DL — LOW (ref 13–17)
HYALINE CASTS # UR AUTO: 5 /LPF — HIGH (ref 0–2)
IMM GRANULOCYTES NFR BLD AUTO: 0.4 % — SIGNIFICANT CHANGE UP (ref 0–1.5)
KETONES UR-MCNC: NEGATIVE — SIGNIFICANT CHANGE UP
LEUKOCYTE ESTERASE UR-ACNC: ABNORMAL
LYMPHOCYTES # BLD AUTO: 0.86 K/UL — LOW (ref 1–3.3)
LYMPHOCYTES # BLD AUTO: 34 % — SIGNIFICANT CHANGE UP (ref 13–44)
MAGNESIUM SERPL-MCNC: 2.2 MG/DL — SIGNIFICANT CHANGE UP (ref 1.6–2.6)
MCHC RBC-ENTMCNC: 29.5 PG — SIGNIFICANT CHANGE UP (ref 27–34)
MCHC RBC-ENTMCNC: 32.7 GM/DL — SIGNIFICANT CHANGE UP (ref 32–36)
MCV RBC AUTO: 90.2 FL — SIGNIFICANT CHANGE UP (ref 80–100)
MONOCYTES # BLD AUTO: 0.29 K/UL — SIGNIFICANT CHANGE UP (ref 0–0.9)
MONOCYTES NFR BLD AUTO: 11.5 % — SIGNIFICANT CHANGE UP (ref 2–14)
NEUTROPHILS # BLD AUTO: 1.27 K/UL — LOW (ref 1.8–7.4)
NEUTROPHILS NFR BLD AUTO: 50.1 % — SIGNIFICANT CHANGE UP (ref 43–77)
NITRITE UR-MCNC: NEGATIVE — SIGNIFICANT CHANGE UP
NRBC # BLD: 0 /100 WBCS — SIGNIFICANT CHANGE UP (ref 0–0)
OSMOLALITY UR: 561 MOS/KG — SIGNIFICANT CHANGE UP (ref 300–900)
PH UR: 6.5 — SIGNIFICANT CHANGE UP (ref 5–8)
PHOSPHATE SERPL-MCNC: 3.3 MG/DL — SIGNIFICANT CHANGE UP (ref 2.5–4.5)
PLATELET # BLD AUTO: 166 K/UL — SIGNIFICANT CHANGE UP (ref 150–400)
POTASSIUM SERPL-MCNC: 4.8 MMOL/L — SIGNIFICANT CHANGE UP (ref 3.5–5.3)
POTASSIUM SERPL-SCNC: 4.8 MMOL/L — SIGNIFICANT CHANGE UP (ref 3.5–5.3)
POTASSIUM UR-SCNC: 54 MMOL/L — SIGNIFICANT CHANGE UP
PROT ?TM UR-MCNC: 38 MG/DL — HIGH (ref 0–12)
PROT SERPL-MCNC: 5.4 G/DL — LOW (ref 6–8.3)
PROT UR-MCNC: ABNORMAL
PROT/CREAT UR-RTO: 0.4 RATIO — HIGH (ref 0–0.2)
RBC # BLD: 2.85 M/UL — LOW (ref 4.2–5.8)
RBC # FLD: 13.2 % — SIGNIFICANT CHANGE UP (ref 10.3–14.5)
RBC CASTS # UR COMP ASSIST: 14 /HPF — HIGH (ref 0–4)
SODIUM SERPL-SCNC: 138 MMOL/L — SIGNIFICANT CHANGE UP (ref 135–145)
SODIUM UR-SCNC: 94 MMOL/L — SIGNIFICANT CHANGE UP
SP GR SPEC: 1.02 — SIGNIFICANT CHANGE UP (ref 1.01–1.02)
UROBILINOGEN FLD QL: NEGATIVE — SIGNIFICANT CHANGE UP
WBC # BLD: 2.53 K/UL — LOW (ref 3.8–10.5)
WBC # FLD AUTO: 2.53 K/UL — LOW (ref 3.8–10.5)
WBC UR QL: 31 /HPF — HIGH (ref 0–5)

## 2020-11-27 PROCEDURE — 93975 VASCULAR STUDY: CPT | Mod: 26

## 2020-11-27 PROCEDURE — 99232 SBSQ HOSP IP/OBS MODERATE 35: CPT

## 2020-11-27 PROCEDURE — 99232 SBSQ HOSP IP/OBS MODERATE 35: CPT | Mod: GC,24

## 2020-11-27 PROCEDURE — 99231 SBSQ HOSP IP/OBS SF/LOW 25: CPT | Mod: GC

## 2020-11-27 PROCEDURE — 99233 SBSQ HOSP IP/OBS HIGH 50: CPT | Mod: GC

## 2020-11-27 RX ADMIN — VALGANCICLOVIR 450 MILLIGRAM(S): 450 TABLET, FILM COATED ORAL at 13:45

## 2020-11-27 RX ADMIN — Medication 5 MILLIGRAM(S): at 05:56

## 2020-11-27 RX ADMIN — MAGNESIUM OXIDE 400 MG ORAL TABLET 800 MILLIGRAM(S): 241.3 TABLET ORAL at 08:45

## 2020-11-27 RX ADMIN — Medication 650 MILLIGRAM(S): at 23:59

## 2020-11-27 RX ADMIN — Medication 10 UNIT(S): at 17:37

## 2020-11-27 RX ADMIN — CEFEPIME 100 MILLIGRAM(S): 1 INJECTION, POWDER, FOR SOLUTION INTRAMUSCULAR; INTRAVENOUS at 05:56

## 2020-11-27 RX ADMIN — Medication 6: at 21:43

## 2020-11-27 RX ADMIN — TAMSULOSIN HYDROCHLORIDE 0.4 MILLIGRAM(S): 0.4 CAPSULE ORAL at 21:33

## 2020-11-27 RX ADMIN — CEFEPIME 100 MILLIGRAM(S): 1 INJECTION, POWDER, FOR SOLUTION INTRAMUSCULAR; INTRAVENOUS at 18:56

## 2020-11-27 RX ADMIN — Medication 4: at 08:40

## 2020-11-27 RX ADMIN — Medication 10 UNIT(S): at 08:40

## 2020-11-27 RX ADMIN — EVEROLIMUS 2 MILLIGRAM(S): 10 TABLET ORAL at 19:52

## 2020-11-27 RX ADMIN — Medication 1 APPLICATION(S): at 13:45

## 2020-11-27 RX ADMIN — Medication 81 MILLIGRAM(S): at 13:44

## 2020-11-27 RX ADMIN — Medication 20 MILLIGRAM(S): at 05:56

## 2020-11-27 RX ADMIN — PANTOPRAZOLE SODIUM 40 MILLIGRAM(S): 20 TABLET, DELAYED RELEASE ORAL at 05:56

## 2020-11-27 RX ADMIN — INSULIN GLARGINE 8 UNIT(S): 100 INJECTION, SOLUTION SUBCUTANEOUS at 21:31

## 2020-11-27 RX ADMIN — Medication 1 TABLET(S): at 13:45

## 2020-11-27 RX ADMIN — EVEROLIMUS 2 MILLIGRAM(S): 10 TABLET ORAL at 08:41

## 2020-11-27 NOTE — PROGRESS NOTE ADULT - ATTENDING COMMENTS
64 year old male with past medical history of cirrhosis s/p OLT (7/2/20 at [facility]), IDDM, MGUS who presented to [facility] on 11/20 with worsening drainage at his right heel with concern for osteomyelitis.    Superficial Cultures here with pseudomonas   MRSA Nasal PCR negative  s/p right foot heel incision and drainage w/ application of stravix and bone biopsy on 11/24  Per Podiatry low concern for OM    I would continue Cefepime at this point  Would maintain Cefepime pending  prelim operative cultures  Definitive determination if there is residual OM will be based off of the pathology but this can take 5-7 days    Wound cultures thus far with GPCCL but only growing in liquid broth  Still could represent contaminant (i.e. Coagulase Negative Staph)  Wouldn't add Vancomycin back on for now  Called micro lab; will have prelim ID tonight  Would continue Cefepime at this point  If true pathogen (i.e. Staph or Enteroccocus) anticipate treating for OM    I will be away over this upcoming weekend. Please contact the Infectious Diseases Office with any further questions or concerns.     Eusebio Pérez M.D.  [facility] Division of Infectious Disease  8AM-5PM: Pager Number [phone]  After Hours (or if no response): Please contact the Infectious Diseases Office at [phone]     The above assessment and plan were discussed with transplant surgery team

## 2020-11-27 NOTE — PROGRESS NOTE ADULT - SUBJECTIVE AND OBJECTIVE BOX
Present:   Patient seen and examined with multidisciplinary team including Transplant Surgeon: Dr. Ackerman,  Transplant Nephrologist: Dr. Salvador,  NP Flora Aekrs and unit RN during am rounds.  Disciplines not in attendance will be notified of the plan.     HPI: 64M with IDDM, HLD, obesity, HFpEF with mild LV diastolic dysfunction, MGUS, chronic anemia with a history of duodenal ulcer 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  Known history of non healing R heel ulcer. Followed by podiatry outpatient Dr. Patel, had recent debridement wound (10/20) cx grew ec faecalis, pseudomonas, enterococcus. Tx with cipro.  No improvement post debridement and PO antibiotic treatment.    Admitted with non healing R heel ulcer. MRI c/w early OM. Podiatry and ID consulted. Wound cultured. Broadened to meropenem.   MMF held. Everolimus dose reduced.     Interval Events:   - afebrile, stable VSS  - S/p right heal debridement 11/24, bone felt healthy, biopsy taken.  - Podiatry following: wound dressing changes   - 11/21 Wound cultures: pseudomonas,  currently on cefepime, Vanco d/cd  - OR cult now Pos GPC speciation/ sensitives pending   - uptrending Scr this am 1.56 form 1.31    Potential Discharge date: pending clinical improvement     Education:  Medications    Plan of care:  See Below      MEDICATIONS  (STANDING):  aspirin enteric coated 81 milliGRAM(s) Oral daily  cefepime   IVPB 2000 milliGRAM(s) IV Intermittent every 12 hours  collagenase Ointment 1 Application(s) Topical daily  dextrose 40% Gel 15 Gram(s) Oral once  dextrose 5%. 1000 milliLiter(s) (50 mL/Hr) IV Continuous <Continuous>  dextrose 5%. 1000 milliLiter(s) (100 mL/Hr) IV Continuous <Continuous>  dextrose 5%. 1000 milliLiter(s) (100 mL/Hr) IV Continuous <Continuous>  dextrose 50% Injectable 25 Gram(s) IV Push once  dextrose 50% Injectable 12.5 Gram(s) IV Push once  dextrose 50% Injectable 25 Gram(s) IV Push once  everolimus (ZORTRESS) 2 milliGRAM(s) Oral <User Schedule>  furosemide   Injectable 20 milliGRAM(s) IV Push daily  glucagon  Injectable 1 milliGRAM(s) IntraMuscular once  insulin glargine Injectable (LANTUS) 8 Unit(s) SubCutaneous at bedtime  insulin lispro (ADMELOG) corrective regimen sliding scale   SubCutaneous Before meals and at bedtime  insulin lispro Injectable (ADMELOG) 10 Unit(s) SubCutaneous three times a day before meals  magnesium oxide 800 milliGRAM(s) Oral with breakfast  pantoprazole    Tablet 40 milliGRAM(s) Oral before breakfast  predniSONE   Tablet 5 milliGRAM(s) Oral daily  tamsulosin 0.4 milliGRAM(s) Oral at bedtime  trimethoprim   80 mG/sulfamethoxazole 400 mG 1 Tablet(s) Oral daily  valGANciclovir 450 milliGRAM(s) Oral daily    MEDICATIONS  (PRN):  acetaminophen   Tablet .. 650 milliGRAM(s) Oral every 6 hours PRN Moderate Pain (4 - 6)      PAST MEDICAL & SURGICAL HISTORY:  GIB (gastrointestinal bleeding)    GERD with esophagitis  Gastritis &amp; Non Bleeding Ulcers    Hepatic encephalopathy    Obesity    Fatty liver disease, nonalcoholic    Renal stones  25 years ago    Hypertension    Neuropathy    Hypercholesteremia    Diabetes    S/P cholecystectomy        Vital Signs Last 24 Hrs  T(C): 36.5 (27 Nov 2020 05:00), Max: 37.3 (27 Nov 2020 01:00)  T(F): 97.7 (27 Nov 2020 05:00), Max: 99.1 (27 Nov 2020 01:00)  HR: 82 (27 Nov 2020 05:00) (80 - 88)  BP: 123/67 (27 Nov 2020 05:00) (103/55 - 147/77)  BP(mean): --  RR: 18 (27 Nov 2020 05:00) (18 - 18)  SpO2: 98% (27 Nov 2020 05:00) (98% - 99%)    I&O's Summary    26 Nov 2020 07:01  -  27 Nov 2020 07:00  --------------------------------------------------------  IN: 1650 mL / OUT: 4425 mL / NET: -2775 mL          LABS:                        8.4    2.53  )-----------( 166      ( 27 Nov 2020 06:09 )             25.7     11-27    138  |  105  |  39<H>  ----------------------------<  273<H>  4.8   |  23  |  1.56<H>    Ca    8.6      27 Nov 2020 06:09  Phos  3.3     11-27  Mg     2.2     11-27    TPro  5.4<L>  /  Alb  3.1<L>  /  TBili  <0.1<L>  /  DBili  x   /  AST  27  /  ALT  27  /  AlkPhos  111  11-27        CAPILLARY BLOOD GLUCOSE      POCT Blood Glucose.: 225 mg/dL (27 Nov 2020 08:37)  POCT Blood Glucose.: 263 mg/dL (26 Nov 2020 21:38)  POCT Blood Glucose.: 163 mg/dL (26 Nov 2020 17:28)  POCT Blood Glucose.: 153 mg/dL (26 Nov 2020 12:19)    LIVER FUNCTIONS - ( 27 Nov 2020 06:09 )  Alb: 3.1 g/dL / Pro: 5.4 g/dL / ALK PHOS: 111 U/L / ALT: 27 U/L / AST: 27 U/L / GGT: x             Culture - Tissue with Gram Stain (collected 11-25-20 @ 00:55)  Source: .Tissue Other  Gram Stain (11-25-20 @ 04:24):    No polymorphonuclear leukocytes seen per low power field    No organisms seen per oil power field  Preliminary Report (11-26-20 @ 22:26):    Growth in fluid media only Gram Positive Cocci in Clusters    Culture - Other (collected 11-21-20 @ 01:03)  Source: Skin wound  Final Report (11-23-20 @ 11:16):    Few Pseudomonas aeruginosa    Normal skin ninfa isolated  Organism: Pseudomonas aeruginosa (11-23-20 @ 11:16)  Organism: Pseudomonas aeruginosa (11-23-20 @ 11:16)    Culture - Blood (collected 11-20-20 @ 23:02)  Source: .Blood Blood-Venous  Final Report (11-26-20 @ 01:01):    No Growth Final    Culture - Blood (collected 11-20-20 @ 23:02)  Source: .Blood Blood-Peripheral  Final Report (11-26-20 @ 01:01):    No Growth Final          Cultures:    Culture - Tissue with Gram Stain (collected 11-25-20 @ 00:55)  Source: .Tissue Other  Gram Stain (11-25-20 @ 04:24):    No polymorphonuclear leukocytes seen per low power field    No organisms seen per oil power field  Preliminary Report (11-26-20 @ 22:26):    Growth in fluid media only Gram Positive Cocci in Clusters    Culture - Other (collected 11-21-20 @ 01:03)  Source: Skin wound  Final Report (11-23-20 @ 11:16):    Few Pseudomonas aeruginosa    Normal skin ninfa isolated  Organism: Pseudomonas aeruginosa (11-23-20 @ 11:16)  Organism: Pseudomonas aeruginosa (11-23-20 @ 11:16)    Culture - Blood (collected 11-20-20 @ 23:02)  Source: .Blood Blood-Venous  Final Report (11-26-20 @ 01:01):    No Growth Final    Culture - Blood (collected 11-20-20 @ 23:02)  Source: .Blood Blood-Peripheral  Final Report (11-26-20 @ 01:01):    No Growth Final          Review of systems  Gen: No weight changes, fatigue, fevers/chills, weakness  Skin: R heel wound  Head/Eyes/Ears/Mouth: No headache; Normal hearing; Normal vision w/o blurriness; No sinus pain/discomfort, sore throat  Respiratory: No dyspnea, cough, wheezing, hemoptysis  CV: No chest pain, PND, orthopnea  GI: no abdominal pain  : No increased frequency, dysuria, hematuria, nocturia  MSK: No joint pain/swelling; no back pain; + LE edema  Neuro: No dizziness/lightheadedness, weakness, seizures, numbness, tingling  Heme: No easy bruising or bleeding  Endo: No heat/cold intolerance  Psych: No significant nervousness, anxiety, stress, depression  All other systems were reviewed and are negative, except as noted.    Physical Exam:   GENERAL:  No acute distress  HEENT:  Normocephalic/atraumatic, no scleral icterus  CHEST:  Clear to auscultation bilaterally, no wheezes/rales/ronchi, no accessory muscle use  HEART:  Regular rate and rhythm, no murmurs/rubs/gallops  ABDOMEN:  Soft, non-tender, non-distended,  well healed incision  EXTREMITIES: RLE with large 4x4 cm ulcer, with overlying granulation tissue and surrounding erythema, some visible bone. +1 BLE edema.  SKIN:  No rash/erythema/ecchymoses/petechiae/wounds/abscess/warm/dry  NEURO:  Alert and oriented x 3, no asterixis

## 2020-11-27 NOTE — PROGRESS NOTE ADULT - SUBJECTIVE AND OBJECTIVE BOX
Follow Up:      Interval History/ROS:Patient is a 64y old  Male who presents with a chief complaint of R heel wound (26 Nov 2020 13:49)      Allergies    codeine (Anaphylaxis)    Intolerances        ANTIMICROBIALS:  cefepime   IVPB 2000 every 12 hours  trimethoprim   80 mG/sulfamethoxazole 400 mG 1 daily  valGANciclovir 450 daily      OTHER MEDS:  acetaminophen   Tablet .. 650 milliGRAM(s) Oral every 6 hours PRN  aspirin enteric coated 81 milliGRAM(s) Oral daily  collagenase Ointment 1 Application(s) Topical daily  dextrose 40% Gel 15 Gram(s) Oral once  dextrose 5%. 1000 milliLiter(s) IV Continuous <Continuous>  dextrose 5%. 1000 milliLiter(s) IV Continuous <Continuous>  dextrose 5%. 1000 milliLiter(s) IV Continuous <Continuous>  dextrose 50% Injectable 25 Gram(s) IV Push once  dextrose 50% Injectable 12.5 Gram(s) IV Push once  dextrose 50% Injectable 25 Gram(s) IV Push once  everolimus (ZORTRESS) 2 milliGRAM(s) Oral <User Schedule>  furosemide   Injectable 20 milliGRAM(s) IV Push daily  glucagon  Injectable 1 milliGRAM(s) IntraMuscular once  insulin glargine Injectable (LANTUS) 8 Unit(s) SubCutaneous at bedtime  insulin lispro (ADMELOG) corrective regimen sliding scale   SubCutaneous Before meals and at bedtime  insulin lispro Injectable (ADMELOG) 10 Unit(s) SubCutaneous three times a day before meals  magnesium oxide 800 milliGRAM(s) Oral with breakfast  pantoprazole    Tablet 40 milliGRAM(s) Oral before breakfast  predniSONE   Tablet 5 milliGRAM(s) Oral daily  tamsulosin 0.4 milliGRAM(s) Oral at bedtime      Vital Signs Last 24 Hrs  T(C): 36.5 (27 Nov 2020 05:00), Max: 37.3 (27 Nov 2020 01:00)  T(F): 97.7 (27 Nov 2020 05:00), Max: 99.1 (27 Nov 2020 01:00)  HR: 82 (27 Nov 2020 05:00) (80 - 88)  BP: 123/67 (27 Nov 2020 05:00) (103/55 - 147/77)  BP(mean): --  RR: 18 (27 Nov 2020 05:00) (18 - 18)  SpO2: 98% (27 Nov 2020 05:00) (98% - 99%)      EXAM:  Constitutional: Not in acute distress  Eyes: No icterus.   Oral cavity: Clear, no lesions  Neck: No neck vein distension noted  RS: Chest clear to auscultation bilaterally. No wheeze/rhonchi/crepitations.  CVS: S1, S2 heard. Regular rate and rhythm. No murmurs/rubs/gallops.  Abdomen: Soft. No guarding/rigidity/tenderness.  : No acute abnormalities  Extremities: Warm. No pedal edema  Skin: + sacral wound without purulence or surrounding erythema, + bandaged right foot   Vascular: No evidence of phlebitis  Neuro: Alert, oriented to time/place/person                                   8.4    2.53  )-----------( 166      ( 27 Nov 2020 06:09 )             25.7       11-27    138  |  105  |  39<H>  ----------------------------<  273<H>  4.8   |  23  |  1.56<H>    Ca    8.6      27 Nov 2020 06:09  Phos  3.3     11-27  Mg     2.2     11-27    TPro  5.4<L>  /  Alb  3.1<L>  /  TBili  <0.1<L>  /  DBili  x   /  AST  27  /  ALT  27  /  AlkPhos  111  11-27          MICROBIOLOGY:    Culture Results:   Growth in fluid media only Gram Positive Cocci in Clusters (11-25 @ 00:55)  Culture Results:   Few Pseudomonas aeruginosa  Normal skin innfa isolated (11-21 @ 01:03)  Culture Results:   No Growth Final (11-20 @ 23:02)  Culture Results:   No Growth Final (11-20 @ 23:02)      RADIOLOGY:    < from: NM SPECT/CT Inflammatory Loc, Single Area Single Day (11.24.20 @ 11:02) >    FINDINGS:  There is congruent uptake of radiolabeled leukocytes and Tc-99m sulfur colloid in the right calcaneus.  No abnormal activity is identified in the right heel region to suggest the presence of osteomyelitis.    There is also increased Tc 99m sulfur colloid activity in the midfoot, and metatarsal joints raising the possibility of neuropathic joint.    IMPRESSION: Combined Indium-111labeled leukocyte study and marrow scan demonstrates:    No scan evidence of osteomyelitis in the right heel.    Findings raise the possibility of neuropathic joint in the right midfoot.        < end of copied text >    < from: Xray Chest PA/Lat Pre Surgical Testing 2 Views (11.23.20 @ 19:30) >  Indication:  Impression:    The heart is normal in size. The lungs are clear. No pleural effusion. No pneumothorax. Degenerative changes of the thoracic spine.    < end of copied text >    < from: MR Foot No Cont, Right (11.21.20 @ 12:58) >    FINDINGS:    The study is markedly limited as there are only 3 sequences which are motion degraded.    There appears to be soft tissue ulceration of the plantar aspect of the calcaneus with bone marrow edema within thesubcortical bone of the lateral aspect of the posterior calcaneus without definitive low T1 signal. This may represent early osteomyelitis. There is marked bone marrow edema at the plantar aspect of the talus laterally with associated low T1 signal. This appears to be degenerative in nature but is difficult to evaluate given the lack of multiplanar imaging.    Mild midfoot arthrosis is present at the TMT joints.    There is marked dorsal subcutaneous soft tissue edema throughout the midfoot and forefoot. Circumferential subcutaneous soft tissue edema seen at the ankle.    IMPRESSION: Markedly limited study.    Possible early osteomyelitis at the plantar and lateral aspect of the calcaneus with adjacent soft tissue ulceration.    Repeat study is recommended when the patient is able to complete entire study.    < end of copied text >    < from: [facility] Physiol Extremity Lower 3+ Level, BI (11.21.20 @ 11:30) >    Findings: Toe brachial index measures 0.44 on the right and0.53 on the left.    PVR waveforms are diminished in pulsatility. Waveform amplitude is diminished at the right digital level. However, digital PPGs are strongly pulsatile.    Impression: Limited study.    No Doppler evidence of hemodynamically significant arterial inflow limitation in either lower extremity.    < end of copied text >    < from: Xray Foot AP + Lateral + Oblique, Right (11.20.20 @ 19:17) >    FINDINGS: No acute fractures or dislocations. Osteoarthritic changes are evident throughout the joints of the foot. Lucency within the heel posterior to the calcaneus likely secondary to ulcer. No definitive signs of bony erosions deep to the ulcer. Prominent dorsal spurring is seen at the tarsometatarsal articulations. There is diffuse soft tissue swelling.    Vascular calcifications.    IMPRESSION: No acute fractures or dislocations. No radiographic evidence for osteomyelitis deep to the posterior heel ulcer. Recommend MRI if clinical concern remains, assuming no clinical contraindications.    < end of copied text >     Follow Up:      Interval History/ROS:Patient is a 64y old  Male who presents with a chief complaint of R heel wound (26 Nov 2020 13:49)      Allergies    codeine (Anaphylaxis)    Intolerances        ANTIMICROBIALS:  cefepime   IVPB 2000 every 12 hours  trimethoprim   80 mG/sulfamethoxazole 400 mG 1 daily  valGANciclovir 450 daily      OTHER MEDS:  acetaminophen   Tablet .. 650 milliGRAM(s) Oral every 6 hours PRN  aspirin enteric coated 81 milliGRAM(s) Oral daily  collagenase Ointment 1 Application(s) Topical daily  dextrose 40% Gel 15 Gram(s) Oral once  dextrose 5%. 1000 milliLiter(s) IV Continuous <Continuous>  dextrose 5%. 1000 milliLiter(s) IV Continuous <Continuous>  dextrose 5%. 1000 milliLiter(s) IV Continuous <Continuous>  dextrose 50% Injectable 25 Gram(s) IV Push once  dextrose 50% Injectable 12.5 Gram(s) IV Push once  dextrose 50% Injectable 25 Gram(s) IV Push once  everolimus (ZORTRESS) 2 milliGRAM(s) Oral <User Schedule>  furosemide   Injectable 20 milliGRAM(s) IV Push daily  glucagon  Injectable 1 milliGRAM(s) IntraMuscular once  insulin glargine Injectable (LANTUS) 8 Unit(s) SubCutaneous at bedtime  insulin lispro (ADMELOG) corrective regimen sliding scale   SubCutaneous Before meals and at bedtime  insulin lispro Injectable (ADMELOG) 10 Unit(s) SubCutaneous three times a day before meals  magnesium oxide 800 milliGRAM(s) Oral with breakfast  pantoprazole    Tablet 40 milliGRAM(s) Oral before breakfast  predniSONE   Tablet 5 milliGRAM(s) Oral daily  tamsulosin 0.4 milliGRAM(s) Oral at bedtime      Vital Signs Last 24 Hrs  T(C): 36.5 (27 Nov 2020 05:00), Max: 37.3 (27 Nov 2020 01:00)  T(F): 97.7 (27 Nov 2020 05:00), Max: 99.1 (27 Nov 2020 01:00)  HR: 82 (27 Nov 2020 05:00) (80 - 88)  BP: 123/67 (27 Nov 2020 05:00) (103/55 - 147/77)  BP(mean): --  RR: 18 (27 Nov 2020 05:00) (18 - 18)  SpO2: 98% (27 Nov 2020 05:00) (98% - 99%)      EXAM:  Constitutional: Not in acute distress  Eyes: No icterus.   Oral cavity: Clear, no lesions  Neck: No neck vein distension noted  RS: Chest clear to auscultation bilaterally. No wheeze/rhonchi/crepitations.  CVS: S1, S2 heard. Regular rate and rhythm. No murmurs/rubs/gallops.  Abdomen: Soft. No guarding/rigidity/tenderness.  : No acute abnormalities  Extremities: Warm. No pedal edema  Skin: + sacral wound without purulence or surrounding erythema, + right heel wound without purulence or surrounding cellulitis   Vascular: No evidence of phlebitis  Neuro: Alert, oriented to time/place/person                                   8.4    2.53  )-----------( 166      ( 27 Nov 2020 06:09 )             25.7       11-27    138  |  105  |  39<H>  ----------------------------<  273<H>  4.8   |  23  |  1.56<H>    Ca    8.6      27 Nov 2020 06:09  Phos  3.3     11-27  Mg     2.2     11-27    TPro  5.4<L>  /  Alb  3.1<L>  /  TBili  <0.1<L>  /  DBili  x   /  AST  27  /  ALT  27  /  AlkPhos  111  11-27          MICROBIOLOGY:    Culture Results:   Growth in fluid media only Gram Positive Cocci in Clusters (11-25 @ 00:55)  Culture Results:   Few Pseudomonas aeruginosa  Normal skin ninfa isolated (11-21 @ 01:03)  Culture Results:   No Growth Final (11-20 @ 23:02)  Culture Results:   No Growth Final (11-20 @ 23:02)      RADIOLOGY:    < from: NM SPECT/CT Inflammatory Loc, Single Area Single Day (11.24.20 @ 11:02) >    FINDINGS:  There is congruent uptake of radiolabeled leukocytes and Tc-99m sulfur colloid in the right calcaneus.  No abnormal activity is identified in the right heel region to suggest the presence of osteomyelitis.    There is also increased Tc 99m sulfur colloid activity in the midfoot, and metatarsal joints raising the possibility of neuropathic joint.    IMPRESSION: Combined Indium-111labeled leukocyte study and marrow scan demonstrates:    No scan evidence of osteomyelitis in the right heel.    Findings raise the possibility of neuropathic joint in the right midfoot.        < end of copied text >    < from: Xray Chest PA/Lat Pre Surgical Testing 2 Views (11.23.20 @ 19:30) >  Indication:  Impression:    The heart is normal in size. The lungs are clear. No pleural effusion. No pneumothorax. Degenerative changes of the thoracic spine.    < end of copied text >    < from: MR Foot No Cont, Right (11.21.20 @ 12:58) >    FINDINGS:    The study is markedly limited as there are only 3 sequences which are motion degraded.    There appears to be soft tissue ulceration of the plantar aspect of the calcaneus with bone marrow edema within thesubcortical bone of the lateral aspect of the posterior calcaneus without definitive low T1 signal. This may represent early osteomyelitis. There is marked bone marrow edema at the plantar aspect of the talus laterally with associated low T1 signal. This appears to be degenerative in nature but is difficult to evaluate given the lack of multiplanar imaging.    Mild midfoot arthrosis is present at the TMT joints.    There is marked dorsal subcutaneous soft tissue edema throughout the midfoot and forefoot. Circumferential subcutaneous soft tissue edema seen at the ankle.    IMPRESSION: Markedly limited study.    Possible early osteomyelitis at the plantar and lateral aspect of the calcaneus with adjacent soft tissue ulceration.    Repeat study is recommended when the patient is able to complete entire study.    < end of copied text >    < from: [facility] Physiol Extremity Lower 3+ Level, BI (11.21.20 @ 11:30) >    Findings: Toe brachial index measures 0.44 on the right and0.53 on the left.    PVR waveforms are diminished in pulsatility. Waveform amplitude is diminished at the right digital level. However, digital PPGs are strongly pulsatile.    Impression: Limited study.    No Doppler evidence of hemodynamically significant arterial inflow limitation in either lower extremity.    < end of copied text >    < from: Xray Foot AP + Lateral + Oblique, Right (11.20.20 @ 19:17) >    FINDINGS: No acute fractures or dislocations. Osteoarthritic changes are evident throughout the joints of the foot. Lucency within the heel posterior to the calcaneus likely secondary to ulcer. No definitive signs of bony erosions deep to the ulcer. Prominent dorsal spurring is seen at the tarsometatarsal articulations. There is diffuse soft tissue swelling.    Vascular calcifications.    IMPRESSION: No acute fractures or dislocations. No radiographic evidence for osteomyelitis deep to the posterior heel ulcer. Recommend MRI if clinical concern remains, assuming no clinical contraindications.    < end of copied text >     Follow Up:  Patient is a 64y old  Male who presents with a chief complaint of R heel wound (26 Nov 2020 13:49)    Interval History/ROS: Afebrile. Notes some right foot discomfort.       Allergies    codeine (Anaphylaxis)    Intolerances        ANTIMICROBIALS:  cefepime   IVPB 2000 every 12 hours  trimethoprim   80 mG/sulfamethoxazole 400 mG 1 daily  valGANciclovir 450 daily      OTHER MEDS:  acetaminophen   Tablet .. 650 milliGRAM(s) Oral every 6 hours PRN  aspirin enteric coated 81 milliGRAM(s) Oral daily  collagenase Ointment 1 Application(s) Topical daily  dextrose 40% Gel 15 Gram(s) Oral once  dextrose 5%. 1000 milliLiter(s) IV Continuous <Continuous>  dextrose 5%. 1000 milliLiter(s) IV Continuous <Continuous>  dextrose 5%. 1000 milliLiter(s) IV Continuous <Continuous>  dextrose 50% Injectable 25 Gram(s) IV Push once  dextrose 50% Injectable 12.5 Gram(s) IV Push once  dextrose 50% Injectable 25 Gram(s) IV Push once  everolimus (ZORTRESS) 2 milliGRAM(s) Oral <User Schedule>  furosemide   Injectable 20 milliGRAM(s) IV Push daily  glucagon  Injectable 1 milliGRAM(s) IntraMuscular once  insulin glargine Injectable (LANTUS) 8 Unit(s) SubCutaneous at bedtime  insulin lispro (ADMELOG) corrective regimen sliding scale   SubCutaneous Before meals and at bedtime  insulin lispro Injectable (ADMELOG) 10 Unit(s) SubCutaneous three times a day before meals  magnesium oxide 800 milliGRAM(s) Oral with breakfast  pantoprazole    Tablet 40 milliGRAM(s) Oral before breakfast  predniSONE   Tablet 5 milliGRAM(s) Oral daily  tamsulosin 0.4 milliGRAM(s) Oral at bedtime      Vital Signs Last 24 Hrs  T(C): 36.5 (27 Nov 2020 05:00), Max: 37.3 (27 Nov 2020 01:00)  T(F): 97.7 (27 Nov 2020 05:00), Max: 99.1 (27 Nov 2020 01:00)  HR: 82 (27 Nov 2020 05:00) (80 - 88)  BP: 123/67 (27 Nov 2020 05:00) (103/55 - 147/77)  BP(mean): --  RR: 18 (27 Nov 2020 05:00) (18 - 18)  SpO2: 98% (27 Nov 2020 05:00) (98% - 99%)      EXAM:  Constitutional: Not in acute distress  Eyes: No icterus.   Oral cavity: Clear, no lesions  Neck: No neck vein distension noted  RS: Chest clear to auscultation bilaterally. No wheeze/rhonchi/crepitations.  CVS: S1, S2 heard. Regular rate and rhythm. No murmurs/rubs/gallops.  Abdomen: Soft. No guarding/rigidity/tenderness.  : No acute abnormalities  Extremities: Warm. No pedal edema  Skin: + sacral wound without purulence or surrounding erythema, + right heel wound without purulence or surrounding cellulitis   Vascular: No evidence of phlebitis  Neuro: Alert, oriented to time/place/person                                   8.4    2.53  )-----------( 166      ( 27 Nov 2020 06:09 )             25.7       11-27    138  |  105  |  39<H>  ----------------------------<  273<H>  4.8   |  23  |  1.56<H>    Ca    8.6      27 Nov 2020 06:09  Phos  3.3     11-27  Mg     2.2     11-27    TPro  5.4<L>  /  Alb  3.1<L>  /  TBili  <0.1<L>  /  DBili  x   /  AST  27  /  ALT  27  /  AlkPhos  111  11-27          MICROBIOLOGY:    Culture Results:   Growth in fluid media only Gram Positive Cocci in Clusters (11-25 @ 00:55)  Culture Results:   Few Pseudomonas aeruginosa  Normal skin ninfa isolated (11-21 @ 01:03)  Culture Results:   No Growth Final (11-20 @ 23:02)  Culture Results:   No Growth Final (11-20 @ 23:02)      RADIOLOGY:    <The imaging below has been reviewed and visualized by me independently. Findings as detailed in report below>    < from: NM SPECT/CT Inflammatory Loc, Single Area Single Day (11.24.20 @ 11:02) >    FINDINGS:  There is congruent uptake of radiolabeled leukocytes and Tc-99m sulfur colloid in the right calcaneus.  No abnormal activity is identified in the right heel region to suggest the presence of osteomyelitis.    There is also increased Tc 99m sulfur colloid activity in the midfoot, and metatarsal joints raising the possibility of neuropathic joint.    IMPRESSION: Combined Indium-111labeled leukocyte study and marrow scan demonstrates:    No scan evidence of osteomyelitis in the right heel.    Findings raise the possibility of neuropathic joint in the right midfoot.        < end of copied text >    < from: Xray Chest PA/Lat Pre Surgical Testing 2 Views (11.23.20 @ 19:30) >  Indication:  Impression:    The heart is normal in size. The lungs are clear. No pleural effusion. No pneumothorax. Degenerative changes of the thoracic spine.    < end of copied text >    < from: MR Foot No Cont, Right (11.21.20 @ 12:58) >    FINDINGS:    The study is markedly limited as there are only 3 sequences which are motion degraded.    There appears to be soft tissue ulceration of the plantar aspect of the calcaneus with bone marrow edema within thesubcortical bone of the lateral aspect of the posterior calcaneus without definitive low T1 signal. This may represent early osteomyelitis. There is marked bone marrow edema at the plantar aspect of the talus laterally with associated low T1 signal. This appears to be degenerative in nature but is difficult to evaluate given the lack of multiplanar imaging.    Mild midfoot arthrosis is present at the TMT joints.    There is marked dorsal subcutaneous soft tissue edema throughout the midfoot and forefoot. Circumferential subcutaneous soft tissue edema seen at the ankle.    IMPRESSION: Markedly limited study.    Possible early osteomyelitis at the plantar and lateral aspect of the calcaneus with adjacent soft tissue ulceration.    Repeat study is recommended when the patient is able to complete entire study.    < end of copied text >    < from: [facility] Physiol Extremity Lower 3+ Level, BI (11.21.20 @ 11:30) >    Findings: Toe brachial index measures 0.44 on the right and0.53 on the left.    PVR waveforms are diminished in pulsatility. Waveform amplitude is diminished at the right digital level. However, digital PPGs are strongly pulsatile.    Impression: Limited study.    No Doppler evidence of hemodynamically significant arterial inflow limitation in either lower extremity.    < end of copied text >    < from: Xray Foot AP + Lateral + Oblique, Right (11.20.20 @ 19:17) >    FINDINGS: No acute fractures or dislocations. Osteoarthritic changes are evident throughout the joints of the foot. Lucency within the heel posterior to the calcaneus likely secondary to ulcer. No definitive signs of bony erosions deep to the ulcer. Prominent dorsal spurring is seen at the tarsometatarsal articulations. There is diffuse soft tissue swelling.    Vascular calcifications.    IMPRESSION: No acute fractures or dislocations. No radiographic evidence for osteomyelitis deep to the posterior heel ulcer. Recommend MRI if clinical concern remains, assuming no clinical contraindications.    < end of copied text >

## 2020-11-27 NOTE — PROGRESS NOTE ADULT - SUBJECTIVE AND OBJECTIVE BOX
INTERVAL HPI/OVERNIGHT EVENTS:    patient seen and examined  events noted     MEDICATIONS  (STANDING):  aspirin enteric coated 81 milliGRAM(s) Oral daily  cefepime   IVPB 2000 milliGRAM(s) IV Intermittent every 12 hours  collagenase Ointment 1 Application(s) Topical daily  Dakins Solution - 1/2 Strength 1 Application(s) Topical daily  dextrose 40% Gel 15 Gram(s) Oral once  dextrose 5%. 1000 milliLiter(s) (50 mL/Hr) IV Continuous <Continuous>  dextrose 5%. 1000 milliLiter(s) (100 mL/Hr) IV Continuous <Continuous>  dextrose 5%. 1000 milliLiter(s) (100 mL/Hr) IV Continuous <Continuous>  dextrose 50% Injectable 25 Gram(s) IV Push once  dextrose 50% Injectable 12.5 Gram(s) IV Push once  dextrose 50% Injectable 25 Gram(s) IV Push once  everolimus (ZORTRESS) 2 milliGRAM(s) Oral <User Schedule>  furosemide   Injectable 20 milliGRAM(s) IV Push daily  glucagon  Injectable 1 milliGRAM(s) IntraMuscular once  insulin glargine Injectable (LANTUS) 8 Unit(s) SubCutaneous at bedtime  insulin lispro (ADMELOG) corrective regimen sliding scale   SubCutaneous every 6 hours  insulin lispro Injectable (ADMELOG) 10 Unit(s) SubCutaneous three times a day before meals  magnesium oxide 800 milliGRAM(s) Oral with breakfast  pantoprazole    Tablet 40 milliGRAM(s) Oral before breakfast  predniSONE   Tablet 5 milliGRAM(s) Oral daily  tamsulosin 0.4 milliGRAM(s) Oral at bedtime  trimethoprim   80 mG/sulfamethoxazole 400 mG 1 Tablet(s) Oral daily  valGANciclovir 450 milliGRAM(s) Oral daily  vancomycin  IVPB 1250 milliGRAM(s) IV Intermittent daily    MEDICATIONS  (PRN):  acetaminophen   Tablet .. 650 milliGRAM(s) Oral every 6 hours PRN Temp greater or equal to 38C (100.4F), Mild Pain (1 - 3)      Allergies    codeine (Anaphylaxis)    Intolerances        Review of Systems:    General:  No wt loss, fevers, chills, night sweats,fatigue,   Eyes:  Good vision, no reported pain  ENT:  No sore throat, pain, runny nose, dysphagia  CV:  No pain, palpitatioins, hypo/hypertension  Resp:  No dyspnea, cough, tachypnea, wheezing  GI:  No pain, No nausea, No vomiting, No diarrhea, No constipatiion, No weight loss, No fever, No pruritis, No rectal bleeding, No tarry stools, No dysphagia,  :  No pain, bleeding, incontinence, nocturia  Muscle:  No pain, weakness  Neuro:  No weakness, tingling, memory problems  Psych:  No fatigue, insomnia, mood problems, depression  Endocrine:  No polyuria, polydypsia, cold/heat intolerance  Heme:  No petechiae, ecchymosis, easy bruisability  Skin:  No rash, tattoos, scars, edema      Vital Signs Last 24 Hrs  T(C): 37 (24 Nov 2020 06:31), Max: 37 (24 Nov 2020 01:00)  T(F): 98.6 (24 Nov 2020 06:31), Max: 98.6 (24 Nov 2020 01:00)  HR: 75 (24 Nov 2020 06:31) (75 - 87)  BP: 119/63 (24 Nov 2020 06:31) (119/63 - 150/68)  BP(mean): --  RR: 18 (24 Nov 2020 06:31) (18 - 18)  SpO2: 98% (24 Nov 2020 06:31) (98% - 100%)    PHYSICAL EXAM:    Constitutional: NAD  HEENT: EOMI, throat clear  Neck: No LAD, supple  Gastrointestinal: BS+, obese, soft, NT/ND  Extremities: + edema, R foot wrap   Neurological: A/O x 3, no focal deficits        LABS:                        8.9    2.16  )-----------( 160      ( 24 Nov 2020 06:15 )             27.7     11-23    140  |  106  |  25<H>  ----------------------------<  145<H>  4.4   |  25  |  1.44<H>    Ca    8.9      23 Nov 2020 05:58  Phos  3.2     11-24  Mg     2.0     11-24    TPro  5.8<L>  /  Alb  3.2<L>  /  TBili  0.1<L>  /  DBili  x   /  AST  15  /  ALT  15  /  AlkPhos  90  11-23    PT/INR - ( 24 Nov 2020 06:15 )   PT: 11.6 sec;   INR: 0.97 ratio         PTT - ( 24 Nov 2020 06:15 )  PTT:30.1 sec      RADIOLOGY & ADDITIONAL TESTS:

## 2020-11-27 NOTE — CONSULT NOTE ADULT - ATTENDING COMMENTS
S/p OLT on 7/2/20, with post-transplant course complicated by CNI neurotoxicity.  Probable right heel osteomyelitis; appreciate Podiatry and Transplant ID input.  Continue antibiotics and will need likely surgical intervention but will awaiting MRI and bone scan results.  Given active infection, will hold MMF and decrease everolimus to 2 mg po q12h (for goal trough level ~3). Unable to switch to CNI given prior severe neurotoxicity with both tacrolimus or cyclosporine even at low doses. Continue prednisone 5 mg po daily.  Continue prophylaxis with Bactrim and Valcyte.
Patient with MISA, uptrending creatinine, non oliguric  Infected right foot ulcer on antibiotic regimen  H/o chronic liver disease, s/p liver transplant with functioning liver allograft, on immunosuppression  CKD risks- DM, Obesity, Nephrolithiasis, MGUS (noted neg S immunofixation)  Reviewed clinical, lab data, I/O, medications  Suggestions:  Check renal sonogram, urinalysis, urine pr/Cr, urine lytes  Avoid nephrotoxic agents  Discussed renoprotective measures- avoid NSAIDs  F/u culture ID and sensitivity of GPC  Will follow  I was present during and reviewed clinical and lab data as well as assessment and plan as documented by the housestaff as noted. Please contact if any additional questions with any change in clinical condition or on availability of any additional information or reports.

## 2020-11-27 NOTE — PROGRESS NOTE ADULT - ASSESSMENT
s/p liver transplant  osteomyelitis     cont regular diet  IV antibiotics per ID  rejection regimen per transplant service  oob as tolerated  podiatry recs noted

## 2020-11-27 NOTE — PROGRESS NOTE ADULT - ASSESSMENT
Assessment:  The patient is a 64 year old male with past medical history of cirrhosis s/p OLT (7/2/20 at [facility]), IDDM, MGUS who presented to [facility] on 11/20 with worsening drainage at his right heel with concern for osteomyelitis. Had a debridement in 10/2020 with cultures growing fairly broadly susceptible Enterococcus faecalis, Pseudomonas and Enterobacter. Patient was treated with Ciprofloxacin Patient has continued to have right heel drainage and wound at the heel.     Plan:  # Suspected right heel would rule out osteomyelitis  - Continue intravenous cefepime   - Final blood cultures negative, wound cultures growing Pseudomonas, Tissue cultures growing GPCC - will await final C&S  - Leukopenia likely due to bactrim and valcyte, continue for now     #Liver Transplant Recipient, Prophylactic Antibiotic (CMV +/-)  - Continue Valcyte 450 mg PO Q24H (CrCl currently 50-59)  - Continue Bactrim SS PO QD  - ID will continue to follow    Charlie Lafleur MD PGY-4   Fellow, Infectious Diseases   Pager: [phone]  If no response, after 5pm and on weekends: Call [phone]     Assessment:  The patient is a 64 year old male with past medical history of cirrhosis s/p OLT (7/2/20 at [facility]), IDDM, MGUS who presented to [facility] on 11/20 with worsening drainage at his right heel with concern for osteomyelitis. Had a debridement in 10/2020 with cultures growing fairly broadly susceptible Enterococcus faecalis, Pseudomonas and Enterobacter. Patient was treated with Ciprofloxacin Patient has continued to have right heel drainage and wound at the heel.     Plan:  # Right heel wound without surrounding cellulitis   - Continue intravenous cefepime   - Wound cultures growing Pseudomonas  - Tissue cultures growing GPCC - will await final C&S  - Await surgical pathology report  - Patient evaluated at bedside with podiatry   - Leukopenia likely due to bactrim and valcyte, continue for now     #Liver Transplant Recipient, Prophylactic Antibiotic (CMV +/-)  - Continue Valcyte 450 mg PO Q24H (CrCl currently 50-59)  - Continue Bactrim SS PO QD  - ID will continue to follow    Charlie Lafleur MD PGY-4   Fellow, Infectious Diseases   Pager: [phone]  If no response, after 5pm and on weekends: Call [phone]

## 2020-11-27 NOTE — PROGRESS NOTE ADULT - ASSESSMENT
Assessment and Plan:   · Assessment	  64M with IDDM, HLD, obesity, HFpEF with mild LV diastolic dysfunction, MGUS, chronic anemia with a history of duodenal ulcer 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  Known history of non healing R heel ulcer. Followed by podiatry outpatient Dr. Patel, had recent debridement wound (10/20) cx grew ec faecalis, pseudomonas, enterococcus. Tx with Cipro without improvement now admitted for IV antibiotics and further intervention from podiatry      [] MISA  new onset MISA  check U/A, urine lytes, urine protein/ cr   check PVR after void  Renal U/S  Transplant nephrology consult      [] R heel ulcer/Osteomyelitis  - MRI c/w early OM, Tagged WCC scan negative.  - S/p OR for right heal debridement 11/24  - 11/21 Wound culture growing few pseudomonas.  Continue Cefepime     - OR cult GPC- pending C+S from surgical specimen.  - Physical therapy eval    [] s/p OLT with good graft function   - Immuno: Everolimus 2/2, MMF on hold in setting of infection, Pred 5  - PPx: bactrim/valcyte (currently on 450mg QD decreased from 900 QD 2/2 low WBC)  - Daily CBC, BMP  - Continue Lasix 20mg IV daily for peripheral edema      [] DM  - Glucose controlled  - Fingersticks ac and qhs  - Continue glargine 15u qhs and  lispro 10u with meals  - SSI coverage

## 2020-11-27 NOTE — PROGRESS NOTE ADULT - ASSESSMENT
65 yo male patient s/p Right heel wound debridement with Stravix graft application and bone biopsy (DOS 11/24/20)  - Pt seen and evaluated   - Afebrile, no leukocytosis  - Right foot surgical wound stable with graft intact with secured adaptic, no hematoma/seroma, no drainage, no signs of infection  - WBAT to right toe touch in heel relief shoe  - Low concern for residual bone infection (hard bone intra-op) and viability  - Wait for OR Bone biopsy pathology and final ID recs  - Follow up at wound care center upon discharge (info in paperwork)

## 2020-11-27 NOTE — CONSULT NOTE ADULT - ASSESSMENT
HPI: HPI: 64M with IDDM, HLD, obesity, HFpEF with mild LV diastolic dysfunction, MGUS, chronic anemia with a history of duodenal ulcer as well as GAVE and duodenal AVM s/p APC (last on 10/11/19), decompensated JEAN/Cirrhosis 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, post op course c/b VRE bacteremia tx with linezolid and delirium likely in setting of CNI toxicity (FK discontinued/everolimus initiated 7/27). Known history of non healing R heel ulcer had recent debridement wound (10/20) cx grew ec faecalis, pseudomonas, enterococcus with No improvement post debridement and PO antibiotic treatment admitted for non-healing right heel ulcer s/p right heal debridement on 11/24/20 by podiatry no finding suggestive of OM. Transplant Nephrology team consulting for MISA.     #Acute Kidney Injury in the setting of infection/ AIN? from medication. Pt appears euvolemic on examination. Non-oliguric  - check UA with urine electrolytes with spot urine TP/CR  - Check renal sonogram with doppler study  - Check PVR  - Continue to monitor renal function  - dose medications as per eGFR      If you have any questions, please feel free to contact me  Kia Mendoza  Nephrology Fellow  Pager: [phone]   (After 5pm or on weekends please page the on-call fellow)

## 2020-11-27 NOTE — PROGRESS NOTE ADULT - SUBJECTIVE AND OBJECTIVE BOX
Patient is a 64y old  Male who presents with a chief complaint of R heel wound (27 Nov 2020 11:48)       INTERVAL HPI/OVERNIGHT EVENTS:  Patient seen and evaluated at bedside.  Pt is resting comfortable in NAD. Denies N/V/F/C.     Allergies    codeine (Anaphylaxis)    Intolerances        Vital Signs Last 24 Hrs  T(C): 36.5 (27 Nov 2020 05:00), Max: 37.3 (27 Nov 2020 01:00)  T(F): 97.7 (27 Nov 2020 05:00), Max: 99.1 (27 Nov 2020 01:00)  HR: 82 (27 Nov 2020 05:00) (80 - 88)  BP: 123/67 (27 Nov 2020 05:00) (123/55 - 147/77)  BP(mean): --  RR: 18 (27 Nov 2020 05:00) (18 - 18)  SpO2: 98% (27 Nov 2020 05:00) (98% - 99%)    LABS:                        8.4    2.53  )-----------( 166      ( 27 Nov 2020 06:09 )             25.7     11-27    138  |  105  |  39<H>  ----------------------------<  273<H>  4.8   |  23  |  1.56<H>    Ca    8.6      27 Nov 2020 06:09  Phos  3.3     11-27  Mg     2.2     11-27    TPro  5.4<L>  /  Alb  3.1<L>  /  TBili  <0.1<L>  /  DBili  x   /  AST  27  /  ALT  27  /  AlkPhos  111  11-27        CAPILLARY BLOOD GLUCOSE      POCT Blood Glucose.: 225 mg/dL (27 Nov 2020 08:37)  POCT Blood Glucose.: 263 mg/dL (26 Nov 2020 21:38)  POCT Blood Glucose.: 163 mg/dL (26 Nov 2020 17:28)      Lower Extremity Physical Exam:  Vascular: DP/PT non-palpable 2/2 edema R foot, L foot unna boot left clean dry and intact, CFT <3 seconds B/L, Temperature gradient R warm to warm  Neuro: Epicritic sensation intact to the level of heel, B/L.    s/p Right heel wound debridement with Stravix graft application and bone biopsy (DOS 11/24/20)  Right foot surgical wound stable with graft intact with secured adaptic, no hematoma/seroma, no drainage, no signs of infection

## 2020-11-27 NOTE — PROGRESS NOTE ADULT - SUBJECTIVE AND OBJECTIVE BOX
Chief Complaint:  Patient is a 64y old  Male who presents with a chief complaint of R heel wound (2020 11:31)      Interval Events:     Bone tissue cx () growing GPC in clusters  Patient has no complaints.     Allergies:  codeine (Anaphylaxis)      Hospital Medications:  acetaminophen   Tablet .. 650 milliGRAM(s) Oral every 6 hours PRN  aspirin enteric coated 81 milliGRAM(s) Oral daily  cefepime   IVPB 2000 milliGRAM(s) IV Intermittent every 12 hours  collagenase Ointment 1 Application(s) Topical daily  dextrose 40% Gel 15 Gram(s) Oral once  dextrose 5%. 1000 milliLiter(s) IV Continuous <Continuous>  dextrose 5%. 1000 milliLiter(s) IV Continuous <Continuous>  dextrose 5%. 1000 milliLiter(s) IV Continuous <Continuous>  dextrose 50% Injectable 25 Gram(s) IV Push once  dextrose 50% Injectable 12.5 Gram(s) IV Push once  dextrose 50% Injectable 25 Gram(s) IV Push once  everolimus (ZORTRESS) 2 milliGRAM(s) Oral <User Schedule>  furosemide   Injectable 20 milliGRAM(s) IV Push daily  glucagon  Injectable 1 milliGRAM(s) IntraMuscular once  insulin glargine Injectable (LANTUS) 8 Unit(s) SubCutaneous at bedtime  insulin lispro (ADMELOG) corrective regimen sliding scale   SubCutaneous Before meals and at bedtime  insulin lispro Injectable (ADMELOG) 10 Unit(s) SubCutaneous three times a day before meals  magnesium oxide 800 milliGRAM(s) Oral with breakfast  pantoprazole    Tablet 40 milliGRAM(s) Oral before breakfast  predniSONE   Tablet 5 milliGRAM(s) Oral daily  tamsulosin 0.4 milliGRAM(s) Oral at bedtime  trimethoprim   80 mG/sulfamethoxazole 400 mG 1 Tablet(s) Oral daily  valGANciclovir 450 milliGRAM(s) Oral daily      PMHX/PSHX:  GIB (gastrointestinal bleeding)    GERD with esophagitis    Hepatic encephalopathy    Obesity    Fatty liver disease, nonalcoholic    Renal stones    Hypertension    Neuropathy    Hypercholesteremia    Diabetes    S/P cholecystectomy    No significant past surgical history        Family history:  Family history of type 2 diabetes mellitus    Family history of hypertension    Family history of stomach cancer    No pertinent family history in first degree relatives        ROS:     General:  No weight loss, fevers, chills, night sweats, fatigue   Eyes:  No vision changes  ENT:  No sore throat, pain, runny nose  CV:  No chest pain, palpitations, dizziness   Resp:  No SOB, cough, wheezing  GI:  See HPI  :  No burning with urination, hematuria  Muscle:  No pain, weakness  Neuro:  No weakness/tingling, memory problems  Psych:  No fatigue, insomnia, mood problems, depression  Heme:  No easy bruisability  Skin:  No rash, edema      PHYSICAL EXAM:     GENERAL:  Non-toxic, no distress, resting comfortably in bed  HEENT:  NC/AT,  conjunctivae clear, sclera anicteric  CHEST:  No increased effort w/ respirations  HEART:  Regular rhythm & rate  ABDOMEN:  Soft, non-tender, non-distended  EXTREMITIES:  2+ pitting edema RLE +R. foot covered in ACE wrap  SKIN:  No rash/erythema/ecchymoses/petechiae/jaundice   NEURO:  Alert, orientedx3      Vital Signs:  Vital Signs Last 24 Hrs  T(C): 36.5 (2020 05:00), Max: 37.3 (2020 01:00)  T(F): 97.7 (:00), Max: 99.1 (2020 01:00)  HR: 82 (:00) (80 - 88)  BP: 123/67 (2020 05:00) (103/55 - 147/77)  BP(mean): --  RR: 18 (:) (18 - 18)  SpO2: 98% (2020 05:00) (98% - 99%)  Daily     Daily Weight in k (:00)    LABS:                        8.4    2.53  )-----------( 166      ( 2020 06:09 )             25.7         138  |  105  |  39<H>  ----------------------------<  273<H>  4.8   |  23  |  1.56<H>    Ca    8.6      2020 06:09  Phos  3.3       Mg     2.2         TPro  5.4<L>  /  Alb  3.1<L>  /  TBili  <0.1<L>  /  DBili  x   /  AST    /  ALT    /  AlkPhos  111      LIVER FUNCTIONS - ( 2020 06:09 )  Alb: 3.1 g/dL / Pro: 5.4 g/dL / ALK PHOS: 111 U/L / ALT: 27 U/L / AST: 27 U/L / GGT: x                   Imaging:

## 2020-11-27 NOTE — CONSULT NOTE ADULT - SUBJECTIVE AND OBJECTIVE BOX
[facility] DIVISION OF KIDNEY DISEASES AND HYPERTENSION -- INITIAL CONSULT NOTE  --------------------------------------------------------------------------------  HPI:        PAST HISTORY  --------------------------------------------------------------------------------  PAST MEDICAL & SURGICAL HISTORY:  GIB (gastrointestinal bleeding)    GERD with esophagitis  Gastritis &amp; Non Bleeding Ulcers    Hepatic encephalopathy    Obesity    Fatty liver disease, nonalcoholic    Renal stones  25 years ago    Hypertension    Neuropathy    Hypercholesteremia    Diabetes    S/P cholecystectomy      FAMILY HISTORY:  Family history of type 2 diabetes mellitus    Family history of hypertension    Family history of stomach cancer      PAST SOCIAL HISTORY:    ALLERGIES & MEDICATIONS  --------------------------------------------------------------------------------  Allergies    codeine (Anaphylaxis)    Intolerances      Standing Inpatient Medications  aspirin enteric coated 81 milliGRAM(s) Oral daily  cefepime   IVPB 2000 milliGRAM(s) IV Intermittent every 12 hours  collagenase Ointment 1 Application(s) Topical daily  dextrose 40% Gel 15 Gram(s) Oral once  dextrose 5%. 1000 milliLiter(s) IV Continuous <Continuous>  dextrose 5%. 1000 milliLiter(s) IV Continuous <Continuous>  dextrose 5%. 1000 milliLiter(s) IV Continuous <Continuous>  dextrose 50% Injectable 25 Gram(s) IV Push once  dextrose 50% Injectable 12.5 Gram(s) IV Push once  dextrose 50% Injectable 25 Gram(s) IV Push once  everolimus (ZORTRESS) 2 milliGRAM(s) Oral <User Schedule>  furosemide   Injectable 20 milliGRAM(s) IV Push daily  glucagon  Injectable 1 milliGRAM(s) IntraMuscular once  insulin glargine Injectable (LANTUS) 8 Unit(s) SubCutaneous at bedtime  insulin lispro (ADMELOG) corrective regimen sliding scale   SubCutaneous Before meals and at bedtime  insulin lispro Injectable (ADMELOG) 10 Unit(s) SubCutaneous three times a day before meals  magnesium oxide 800 milliGRAM(s) Oral with breakfast  pantoprazole    Tablet 40 milliGRAM(s) Oral before breakfast  predniSONE   Tablet 5 milliGRAM(s) Oral daily  tamsulosin 0.4 milliGRAM(s) Oral at bedtime  trimethoprim   80 mG/sulfamethoxazole 400 mG 1 Tablet(s) Oral daily  valGANciclovir 450 milliGRAM(s) Oral daily    PRN Inpatient Medications  acetaminophen   Tablet .. 650 milliGRAM(s) Oral every 6 hours PRN      REVIEW OF SYSTEMS  --------------------------------------------------------------------------------  Gen: No weight changes, fatigue, fevers/chills, weakness  Skin: No rashes  Head/Eyes/Ears/Mouth: No headache; Normal hearing; Normal vision w/o blurriness; No sinus pain/discomfort, sore throat  Respiratory: No dyspnea, cough, wheezing, hemoptysis  CV: No chest pain, PND, orthopnea  GI: No abdominal pain, diarrhea, constipation, nausea, vomiting, melena, hematochezia  : No increased frequency, dysuria, hematuria, nocturia  MSK: No joint pain/swelling; no back pain; no edema  Neuro: No dizziness/lightheadedness, weakness, seizures, numbness, tingling  Heme: No easy bruising or bleeding  Endo: No heat/cold intolerance  Psych: No significant nervousness, anxiety, stress, depression    All other systems were reviewed and are negative, except as noted.    VITALS/PHYSICAL EXAM  --------------------------------------------------------------------------------  T(C): 36.5 (11-27-20 @ 05:00), Max: 37.3 (11-27-20 @ 01:00)  HR: 82 (11-27-20 @ 05:00) (80 - 88)  BP: 123/67 (11-27-20 @ 05:00) (103/55 - 147/77)  RR: 18 (11-27-20 @ 05:00) (18 - 18)  SpO2: 98% (11-27-20 @ 05:00) (98% - 99%)  Wt(kg): --        11-26-20 @ 07:01  -  11-27-20 @ 07:00  --------------------------------------------------------  IN: 1650 mL / OUT: 4425 mL / NET: -2775 mL      Physical Exam:  	Gen: NAD, well-appearing  	HEENT: MMM  	Pulm: CTA B/L  	CV: RRR, S1S2; no rub  	Abd: +BS, soft, nontender/nondistended              Transplant:  	LE: no LE edema  	Neuro: AOx3   	Psych: Normal affect and mood  	Skin: Warm, without rashes  	Vascular access:    LABS/STUDIES  --------------------------------------------------------------------------------              8.4    2.53  >-----------<  166      [11-27-20 @ 06:09]              25.7     138  |  105  |  39  ----------------------------<  273      [11-27-20 @ 06:09]  4.8   |  23  |  1.56        Ca     8.6     [11-27-20 @ 06:09]      Mg     2.2     [11-27-20 @ 06:09]      Phos  3.3     [11-27-20 @ 06:09]    TPro  5.4  /  Alb  3.1  /  TBili  <0.1  /  DBili  x   /  AST  27  /  ALT  27  /  AlkPhos  111  [11-27-20 @ 06:09]    Creatinine Trend:  SCr 1.56 [11-27 @ 06:09]  SCr 1.31 [11-26 @ 06:02]  SCr 1.36 [11-25 @ 06:12]  SCr 1.44 [11-23 @ 05:58]  SCr 1.46 [11-22 @ 06:27]    Urinalysis - [07-30-20 @ 18:15]      Color Light Yellow / Appearance Clear / SG 1.022 / pH 6.0      Gluc Negative / Ketone Negative  / Bili Negative / Urobili Negative       Blood Trace / Protein Trace / Leuk Est Negative / Nitrite Negative      RBC 4 / WBC 11 / Hyaline 0 / Gran  / Sq Epi  / Non Sq Epi 0 / Bacteria Negative      Iron 160, TIBC Unable to calculate Test Repeated, %sat Unable to calculate Test Repeated      [06-12-20 @ 09:04]  Ferritin 493      [04-29-20 @ 08:20]  PTH -- (Ca 10.3)      [12-13-19 @ 08:11]   12  Vitamin D (25OH) 25.3      [12-13-19 @ 08:11]  HbA1c 6.4      [02-12-20 @ 17:08]  TSH 3.26      [04-26-20 @ 09:47]  Lipid: chol 158, , HDL 44, LDL 93      [08-05-20 @ 08:57]    HBsAb <3.0      [12-04-19 @ 08:33]  HBsAb Nonreact      [06-13-20 @ 10:35]  HBsAg Nonreact      [06-13-20 @ 10:35]  HBcAb Nonreact      [06-13-20 @ 10:35]  HCV 0.14, Nonreact      [12-04-19 @ 08:33]  HIV Nonreact      [10-23-19 @ 05:08]    SRAVAN: titer Negative, pattern --      [12-03-19 @ 09:01]  dsDNA <12      [02-05-18 @ 18:48]  C3 Complement 36      [11-27-19 @ 00:11]  C4 Complement 7      [11-27-19 @ 00:11]  ANCA: cANCA Negative, pANCA Negative, atypical ANCA Negative      [02-05-18 @ 18:48]  Syphilis Screen (Treponema Pallidum Ab) Negative      [12-04-19 @ 08:33]  Free Light Chains: kappa 10.93, lambda 3.76, ratio = 2.91      [12-06 @ 09:43]  Immunofixation Serum:    One Weak IgA Lambda  Band and One Weak IgG Lambda Band Identified    Reference Range: None Detected      [04-29-20 @ 08:20]  SPEP Interpretation: Two Weak Gamma-Migrating Paraproteins Identified      [04-29-20 @ 08:20]  Immunofixation Urine: Reference Range: None Detected      [12-04-19 @ 14:03]  UPEP Interpretation: Weak Gamma-Migrating Paraprotein Identified      [12-04-19 @ 14:03]    Tacrolimus  Cyclosporine  Sirolimus  Mycophenolate  BK PCR  CMV PCR  Parvo PCR  EBV PCR [facility] DIVISION OF KIDNEY DISEASES AND HYPERTENSION -- INITIAL CONSULT NOTE  --------------------------------------------------------------------------------  HPI: HPI: 64M with IDDM, HLD, obesity, HFpEF with mild LV diastolic dysfunction, MGUS, chronic anemia with a history of duodenal ulcer as well as GAVE and duodenal AVM s/p APC (last on 10/11/19), decompensated JEAN/Cirrhosis 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, post op course c/b VRE bacteremia tx with linezolid and delirium likely in setting of CNI toxicity (FK discontinued/everolimus initiated 7/27). Known history of non healing R heel ulcer had recent debridement wound (10/20) cx grew ec faecalis, pseudomonas, enterococcus with No improvement post debridement and PO antibiotic treatment admitted for non-healing right heel ulcer s/p right heal debridement on 11/24/20 by podiatry no finding suggestive of OM. Transplant Nephrology team consulting for MISA.     On admission, Scr noted to be 1.3 11/21/20 and has been stable throughout hospitalization however has increased to 1.56 today 11/27/20. Patient seen and examined without complaints this morning. Denies chest pain, nausea/vomiting, abdominal pain, SOB, diarrhea.       PAST HISTORY  --------------------------------------------------------------------------------  PAST MEDICAL & SURGICAL HISTORY:  GIB (gastrointestinal bleeding)    GERD with esophagitis  Gastritis &amp; Non Bleeding Ulcers    Hepatic encephalopathy    Obesity    Fatty liver disease, nonalcoholic    Renal stones  25 years ago    Hypertension    Neuropathy    Hypercholesteremia    Diabetes    S/P cholecystectomy      FAMILY HISTORY:  Family history of type 2 diabetes mellitus    Family history of hypertension    Family history of stomach cancer      PAST SOCIAL HISTORY:    ALLERGIES & MEDICATIONS  --------------------------------------------------------------------------------  Allergies    codeine (Anaphylaxis)    Intolerances      Standing Inpatient Medications  aspirin enteric coated 81 milliGRAM(s) Oral daily  cefepime   IVPB 2000 milliGRAM(s) IV Intermittent every 12 hours  collagenase Ointment 1 Application(s) Topical daily  dextrose 40% Gel 15 Gram(s) Oral once  dextrose 5%. 1000 milliLiter(s) IV Continuous <Continuous>  dextrose 5%. 1000 milliLiter(s) IV Continuous <Continuous>  dextrose 5%. 1000 milliLiter(s) IV Continuous <Continuous>  dextrose 50% Injectable 25 Gram(s) IV Push once  dextrose 50% Injectable 12.5 Gram(s) IV Push once  dextrose 50% Injectable 25 Gram(s) IV Push once  everolimus (ZORTRESS) 2 milliGRAM(s) Oral <User Schedule>  furosemide   Injectable 20 milliGRAM(s) IV Push daily  glucagon  Injectable 1 milliGRAM(s) IntraMuscular once  insulin glargine Injectable (LANTUS) 8 Unit(s) SubCutaneous at bedtime  insulin lispro (ADMELOG) corrective regimen sliding scale   SubCutaneous Before meals and at bedtime  insulin lispro Injectable (ADMELOG) 10 Unit(s) SubCutaneous three times a day before meals  magnesium oxide 800 milliGRAM(s) Oral with breakfast  pantoprazole    Tablet 40 milliGRAM(s) Oral before breakfast  predniSONE   Tablet 5 milliGRAM(s) Oral daily  tamsulosin 0.4 milliGRAM(s) Oral at bedtime  trimethoprim   80 mG/sulfamethoxazole 400 mG 1 Tablet(s) Oral daily  valGANciclovir 450 milliGRAM(s) Oral daily    PRN Inpatient Medications  acetaminophen   Tablet .. 650 milliGRAM(s) Oral every 6 hours PRN      REVIEW OF SYSTEMS  --------------------------------------------------------------------------------  Gen: No weight changes, fatigue, fevers/chills, weakness  Head/Eyes/Ears/Mouth: No headache;  Respiratory: No dyspnea,   CV: No chest pain  GI: No abdominal pain, diarrhea  : No increased frequency, dysuria  MSK: No joint pain/swelling  All other systems were reviewed and are negative, except as noted.    VITALS/PHYSICAL EXAM  --------------------------------------------------------------------------------  T(C): 36.5 (11-27-20 @ 05:00), Max: 37.3 (11-27-20 @ 01:00)  HR: 82 (11-27-20 @ 05:00) (80 - 88)  BP: 123/67 (11-27-20 @ 05:00) (103/55 - 147/77)  RR: 18 (11-27-20 @ 05:00) (18 - 18)  SpO2: 98% (11-27-20 @ 05:00) (98% - 99%)  Wt(kg): --    11-26-20 @ 07:01  -  11-27-20 @ 07:00  --------------------------------------------------------  IN: 1650 mL / OUT: 4425 mL / NET: -2775 mL      Physical Exam:  	Gen: NAD, well-appearing  	HEENT: MMM  	Pulm: CTA B/L  	CV: RRR, S1S2; no rub  	Abd: +BS, soft, nontender/nondistended  	LE: no LE edema, RLE dressing   	Neuro: AOx3   	Psych: Normal affect and mood  	Skin: Warm, without rashes  	Vascular access:    LABS/STUDIES  --------------------------------------------------------------------------------              8.4    2.53  >-----------<  166      [11-27-20 @ 06:09]              25.7     138  |  105  |  39  ----------------------------<  273      [11-27-20 @ 06:09]  4.8   |  23  |  1.56        Ca     8.6     [11-27-20 @ 06:09]      Mg     2.2     [11-27-20 @ 06:09]      Phos  3.3     [11-27-20 @ 06:09]    TPro  5.4  /  Alb  3.1  /  TBili  <0.1  /  DBili  x   /  AST  27  /  ALT  27  /  AlkPhos  111  [11-27-20 @ 06:09]    Creatinine Trend:  SCr 1.56 [11-27 @ 06:09]  SCr 1.31 [11-26 @ 06:02]  SCr 1.36 [11-25 @ 06:12]  SCr 1.44 [11-23 @ 05:58]  SCr 1.46 [11-22 @ 06:27]    Urinalysis - [07-30-20 @ 18:15]      Color Light Yellow / Appearance Clear / SG 1.022 / pH 6.0      Gluc Negative / Ketone Negative  / Bili Negative / Urobili Negative       Blood Trace / Protein Trace / Leuk Est Negative / Nitrite Negative      RBC 4 / WBC 11 / Hyaline 0 / Gran  / Sq Epi  / Non Sq Epi 0 / Bacteria Negative    Iron 160, TIBC Unable to calculate Test Repeated, %sat Unable to calculate Test Repeated      [06-12-20 @ 09:04]  Ferritin 493      [04-29-20 @ 08:20]  PTH -- (Ca 10.3)      [12-13-19 @ 08:11]   12  Vitamin D (25OH) 25.3      [12-13-19 @ 08:11]  HbA1c 6.4      [02-12-20 @ 17:08]  TSH 3.26      [04-26-20 @ 09:47]  Lipid: chol 158, , HDL 44, LDL 93      [08-05-20 @ 08:57]    HBsAb <3.0      [12-04-19 @ 08:33]  HBsAb Nonreact      [06-13-20 @ 10:35]  HBsAg Nonreact      [06-13-20 @ 10:35]  HBcAb Nonreact      [06-13-20 @ 10:35]  HCV 0.14, Nonreact      [12-04-19 @ 08:33]  HIV Nonreact      [10-23-19 @ 05:08]    SRAVAN: titer Negative, pattern --      [12-03-19 @ 09:01]  dsDNA <12      [02-05-18 @ 18:48]  C3 Complement 36      [11-27-19 @ 00:11]  C4 Complement 7      [11-27-19 @ 00:11]  ANCA: cANCA Negative, pANCA Negative, atypical ANCA Negative      [02-05-18 @ 18:48]  Syphilis Screen (Treponema Pallidum Ab) Negative      [12-04-19 @ 08:33]  Free Light Chains: kappa 10.93, lambda 3.76, ratio = 2.91      [12-06 @ 09:43]  Immunofixation Serum:    One Weak IgA Lambda  Band and One Weak IgG Lambda Band Identified    Reference Range: None Detected      [04-29-20 @ 08:20]  SPEP Interpretation: Two Weak Gamma-Migrating Paraproteins Identified      [04-29-20 @ 08:20]  Immunofixation Urine: Reference Range: None Detected      [12-04-19 @ 14:03]  UPEP Interpretation: Weak Gamma-Migrating Paraprotein Identified      [12-04-19 @ 14:03]   [facility] DIVISION OF KIDNEY DISEASES AND HYPERTENSION -- INITIAL CONSULT NOTE  --------------------------------------------------------------------------------  HPI: HPI: 64M with IDDM, HLD, obesity, HFpEF with mild LV diastolic dysfunction, MGUS, chronic anemia with a history of duodenal ulcer as well as GAVE and duodenal AVM s/p APC (last on 10/11/19), decompensated JEAN/Cirrhosis 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, post op course c/b VRE bacteremia tx with linezolid and delirium likely in setting of CNI toxicity (FK discontinued/everolimus initiated 7/27). Known history of non healing R heel ulcer had recent debridement wound (10/20) cx grew ec faecalis, pseudomonas, enterococcus with No improvement post debridement and PO antibiotic treatment admitted for non-healing right heel ulcer s/p right heal debridement on 11/24/20 by podiatry no finding suggestive of OM. Transplant Nephrology team consulting for MISA.     On admission, Scr noted to be 1.3 11/21/20 and has been stable throughout hospitalization however has increased to 1.56 today 11/27/20. Patient seen and examined without complaints this morning. Denies chest pain, nausea/vomiting, abdominal pain, SOB, diarrhea.       PAST HISTORY  --------------------------------------------------------------------------------  PAST MEDICAL & SURGICAL HISTORY:  GIB (gastrointestinal bleeding)    GERD with esophagitis  Gastritis &amp; Non Bleeding Ulcers    Hepatic encephalopathy    Obesity    Fatty liver disease, nonalcoholic    Renal stones  25 years ago    Hypertension    Neuropathy    Hypercholesteremia    Diabetes    S/P cholecystectomy      FAMILY HISTORY:  Family history of type 2 diabetes mellitus    Family history of hypertension    Family history of stomach cancer      PAST SOCIAL HISTORY:    ALLERGIES & MEDICATIONS  --------------------------------------------------------------------------------  Allergies    codeine (Anaphylaxis)    Intolerances      Standing Inpatient Medications  aspirin enteric coated 81 milliGRAM(s) Oral daily  cefepime   IVPB 2000 milliGRAM(s) IV Intermittent every 12 hours  collagenase Ointment 1 Application(s) Topical daily  dextrose 40% Gel 15 Gram(s) Oral once  dextrose 5%. 1000 milliLiter(s) IV Continuous <Continuous>  dextrose 5%. 1000 milliLiter(s) IV Continuous <Continuous>  dextrose 5%. 1000 milliLiter(s) IV Continuous <Continuous>  dextrose 50% Injectable 25 Gram(s) IV Push once  dextrose 50% Injectable 12.5 Gram(s) IV Push once  dextrose 50% Injectable 25 Gram(s) IV Push once  everolimus (ZORTRESS) 2 milliGRAM(s) Oral <User Schedule>  furosemide   Injectable 20 milliGRAM(s) IV Push daily  glucagon  Injectable 1 milliGRAM(s) IntraMuscular once  insulin glargine Injectable (LANTUS) 8 Unit(s) SubCutaneous at bedtime  insulin lispro (ADMELOG) corrective regimen sliding scale   SubCutaneous Before meals and at bedtime  insulin lispro Injectable (ADMELOG) 10 Unit(s) SubCutaneous three times a day before meals  magnesium oxide 800 milliGRAM(s) Oral with breakfast  pantoprazole    Tablet 40 milliGRAM(s) Oral before breakfast  predniSONE   Tablet 5 milliGRAM(s) Oral daily  tamsulosin 0.4 milliGRAM(s) Oral at bedtime  trimethoprim   80 mG/sulfamethoxazole 400 mG 1 Tablet(s) Oral daily  valGANciclovir 450 milliGRAM(s) Oral daily    PRN Inpatient Medications  acetaminophen   Tablet .. 650 milliGRAM(s) Oral every 6 hours PRN      REVIEW OF SYSTEMS  --------------------------------------------------------------------------------  Gen: No weight changes, fatigue, fevers/chills, weakness  Head/Eyes/Ears/Mouth: No headache;  Respiratory: No dyspnea,   CV: No chest pain  GI: No abdominal pain, diarrhea  : No increased frequency, dysuria  MSK: No joint pain/swelling  All other systems were reviewed and are negative, except as noted.    VITALS/PHYSICAL EXAM  --------------------------------------------------------------------------------  T(C): 36.5 (11-27-20 @ 05:00), Max: 37.3 (11-27-20 @ 01:00)  HR: 82 (11-27-20 @ 05:00) (80 - 88)  BP: 123/67 (11-27-20 @ 05:00) (103/55 - 147/77)  RR: 18 (11-27-20 @ 05:00) (18 - 18)  SpO2: 98% (11-27-20 @ 05:00) (98% - 99%)  Wt(kg): --    11-26-20 @ 07:01  -  11-27-20 @ 07:00  --------------------------------------------------------  IN: 1650 mL / OUT: 4425 mL / NET: -2775 mL      Physical Exam:  	Gen: NAD, well-appearing  	HEENT: MMM  	Pulm: CTA B/L  	CV: RRR, S1S2; no rub  	Abd: +BS, soft, nontender/nondistended  	LE: no LE edema, RLE dressing   	Neuro: AOx3   	Psych: Normal affect and mood  	Skin: Warm, without rashes    LABS/STUDIES  --------------------------------------------------------------------------------              8.4    2.53  >-----------<  166      [11-27-20 @ 06:09]              25.7     138  |  105  |  39  ----------------------------<  273      [11-27-20 @ 06:09]  4.8   |  23  |  1.56        Ca     8.6     [11-27-20 @ 06:09]      Mg     2.2     [11-27-20 @ 06:09]      Phos  3.3     [11-27-20 @ 06:09]    TPro  5.4  /  Alb  3.1  /  TBili  <0.1  /  DBili  x   /  AST  27  /  ALT  27  /  AlkPhos  111  [11-27-20 @ 06:09]    Creatinine Trend:  SCr 1.56 [11-27 @ 06:09]  SCr 1.31 [11-26 @ 06:02]  SCr 1.36 [11-25 @ 06:12]  SCr 1.44 [11-23 @ 05:58]  SCr 1.46 [11-22 @ 06:27]    Urinalysis - [07-30-20 @ 18:15]      Color Light Yellow / Appearance Clear / SG 1.022 / pH 6.0      Gluc Negative / Ketone Negative  / Bili Negative / Urobili Negative       Blood Trace / Protein Trace / Leuk Est Negative / Nitrite Negative      RBC 4 / WBC 11 / Hyaline 0 / Gran  / Sq Epi  / Non Sq Epi 0 / Bacteria Negative    Iron 160, TIBC Unable to calculate Test Repeated, %sat Unable to calculate Test Repeated      [06-12-20 @ 09:04]  Ferritin 493      [04-29-20 @ 08:20]  PTH -- (Ca 10.3)      [12-13-19 @ 08:11]   12  Vitamin D (25OH) 25.3      [12-13-19 @ 08:11]  HbA1c 6.4      [02-12-20 @ 17:08]  TSH 3.26      [04-26-20 @ 09:47]  Lipid: chol 158, , HDL 44, LDL 93      [08-05-20 @ 08:57]    HBsAb <3.0      [12-04-19 @ 08:33]  HBsAb Nonreact      [06-13-20 @ 10:35]  HBsAg Nonreact      [06-13-20 @ 10:35]  HBcAb Nonreact      [06-13-20 @ 10:35]  HCV 0.14, Nonreact      [12-04-19 @ 08:33]  HIV Nonreact      [10-23-19 @ 05:08]    SRAVAN: titer Negative, pattern --      [12-03-19 @ 09:01]  dsDNA <12      [02-05-18 @ 18:48]  C3 Complement 36      [11-27-19 @ 00:11]  C4 Complement 7      [11-27-19 @ 00:11]  ANCA: cANCA Negative, pANCA Negative, atypical ANCA Negative      [02-05-18 @ 18:48]  Syphilis Screen (Treponema Pallidum Ab) Negative      [12-04-19 @ 08:33]  Free Light Chains: kappa 10.93, lambda 3.76, ratio = 2.91      [12-06 @ 09:43]  Immunofixation Serum:    One Weak IgA Lambda  Band and One Weak IgG Lambda Band Identified    Reference Range: None Detected      [04-29-20 @ 08:20]  SPEP Interpretation: Two Weak Gamma-Migrating Paraproteins Identified      [04-29-20 @ 08:20]  Immunofixation Urine: Reference Range: None Detected      [12-04-19 @ 14:03]  UPEP Interpretation: Weak Gamma-Migrating Paraprotein Identified      [12-04-19 @ 14:03]   [facility] DIVISION OF KIDNEY DISEASES AND HYPERTENSION -- INITIAL CONSULT NOTE  --------------------------------------------------------------------------------  HPI: 64M with IDDM, HLD, obesity, HFpEF with mild LV diastolic dysfunction, MGUS, chronic anemia with a history of duodenal ulcer as well as GAVE and duodenal AVM s/p APC (last on 10/11/19), decompensated JEAN/Cirrhosis 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, post op course c/b VRE bacteremia tx with linezolid and delirium likely in setting of CNI toxicity (FK discontinued/everolimus initiated 7/27). Known history of non healing R heel ulcer had recent debridement wound (10/20) cx grew ec faecalis, pseudomonas, enterococcus with No improvement post debridement and PO antibiotic treatment admitted for non-healing right heel ulcer s/p right heal debridement on 11/24/20 by podiatry no finding suggestive of OM. Transplant Nephrology team consulting for MISA.     On admission, Scr noted to be 1.3 11/21/20 and has been stable throughout hospitalization however has increased to 1.56 today 11/27/20. Patient seen and examined without complaints this morning. Denies chest pain, nausea/vomiting, abdominal pain, SOB, diarrhea.       PAST HISTORY  --------------------------------------------------------------------------------  PAST MEDICAL & SURGICAL HISTORY:  GIB (gastrointestinal bleeding)    GERD with esophagitis  Gastritis &amp; Non Bleeding Ulcers    Hepatic encephalopathy    Obesity    Fatty liver disease, nonalcoholic    Renal stones  25 years ago    Hypertension    Neuropathy    Hypercholesteremia    Diabetes    S/P cholecystectomy      FAMILY HISTORY:  Family history of type 2 diabetes mellitus    Family history of hypertension    Family history of stomach cancer      PAST SOCIAL HISTORY:    ALLERGIES & MEDICATIONS  --------------------------------------------------------------------------------  Allergies    codeine (Anaphylaxis)    Intolerances      Standing Inpatient Medications  aspirin enteric coated 81 milliGRAM(s) Oral daily  cefepime   IVPB 2000 milliGRAM(s) IV Intermittent every 12 hours  collagenase Ointment 1 Application(s) Topical daily  dextrose 40% Gel 15 Gram(s) Oral once  dextrose 5%. 1000 milliLiter(s) IV Continuous <Continuous>  dextrose 5%. 1000 milliLiter(s) IV Continuous <Continuous>  dextrose 5%. 1000 milliLiter(s) IV Continuous <Continuous>  dextrose 50% Injectable 25 Gram(s) IV Push once  dextrose 50% Injectable 12.5 Gram(s) IV Push once  dextrose 50% Injectable 25 Gram(s) IV Push once  everolimus (ZORTRESS) 2 milliGRAM(s) Oral <User Schedule>  furosemide   Injectable 20 milliGRAM(s) IV Push daily  glucagon  Injectable 1 milliGRAM(s) IntraMuscular once  insulin glargine Injectable (LANTUS) 8 Unit(s) SubCutaneous at bedtime  insulin lispro (ADMELOG) corrective regimen sliding scale   SubCutaneous Before meals and at bedtime  insulin lispro Injectable (ADMELOG) 10 Unit(s) SubCutaneous three times a day before meals  magnesium oxide 800 milliGRAM(s) Oral with breakfast  pantoprazole    Tablet 40 milliGRAM(s) Oral before breakfast  predniSONE   Tablet 5 milliGRAM(s) Oral daily  tamsulosin 0.4 milliGRAM(s) Oral at bedtime  trimethoprim   80 mG/sulfamethoxazole 400 mG 1 Tablet(s) Oral daily  valGANciclovir 450 milliGRAM(s) Oral daily    PRN Inpatient Medications  acetaminophen   Tablet .. 650 milliGRAM(s) Oral every 6 hours PRN      REVIEW OF SYSTEMS  --------------------------------------------------------------------------------  Gen: No weight changes, fatigue, fevers/chills, weakness  Head/Eyes/Ears/Mouth: No headache;  Respiratory: No dyspnea,   CV: No chest pain  GI: No abdominal pain, diarrhea  : No increased frequency, dysuria  MSK: No joint pain/swelling  All other systems were reviewed and are negative, except as noted.    VITALS/PHYSICAL EXAM  --------------------------------------------------------------------------------  T(C): 36.5 (11-27-20 @ 05:00), Max: 37.3 (11-27-20 @ 01:00)  HR: 82 (11-27-20 @ 05:00) (80 - 88)  BP: 123/67 (11-27-20 @ 05:00) (103/55 - 147/77)  RR: 18 (11-27-20 @ 05:00) (18 - 18)  SpO2: 98% (11-27-20 @ 05:00) (98% - 99%)  Wt(kg): --    11-26-20 @ 07:01  -  11-27-20 @ 07:00  --------------------------------------------------------  IN: 1650 mL / OUT: 4425 mL / NET: -2775 mL      Physical Exam:  	Gen: NAD, well-appearing  	HEENT: MMM  	Pulm: CTA B/L  	CV: RRR, S1S2; no rub  	Abd: +BS, soft, nontender/nondistended  	LE: no LE edema, RLE dressing   	Neuro: AOx3   	Psych: Normal affect and mood  	Skin: Warm, without rashes    LABS/STUDIES  --------------------------------------------------------------------------------              8.4    2.53  >-----------<  166      [11-27-20 @ 06:09]              25.7     138  |  105  |  39  ----------------------------<  273      [11-27-20 @ 06:09]  4.8   |  23  |  1.56        Ca     8.6     [11-27-20 @ 06:09]      Mg     2.2     [11-27-20 @ 06:09]      Phos  3.3     [11-27-20 @ 06:09]    TPro  5.4  /  Alb  3.1  /  TBili  <0.1  /  DBili  x   /  AST  27  /  ALT  27  /  AlkPhos  111  [11-27-20 @ 06:09]    Creatinine Trend:  SCr 1.56 [11-27 @ 06:09]  SCr 1.31 [11-26 @ 06:02]  SCr 1.36 [11-25 @ 06:12]  SCr 1.44 [11-23 @ 05:58]  SCr 1.46 [11-22 @ 06:27]    Urinalysis - [07-30-20 @ 18:15]      Color Light Yellow / Appearance Clear / SG 1.022 / pH 6.0      Gluc Negative / Ketone Negative  / Bili Negative / Urobili Negative       Blood Trace / Protein Trace / Leuk Est Negative / Nitrite Negative      RBC 4 / WBC 11 / Hyaline 0 / Gran  / Sq Epi  / Non Sq Epi 0 / Bacteria Negative    Iron 160, TIBC Unable to calculate Test Repeated, %sat Unable to calculate Test Repeated      [06-12-20 @ 09:04]  Ferritin 493      [04-29-20 @ 08:20]  PTH -- (Ca 10.3)      [12-13-19 @ 08:11]   12  Vitamin D (25OH) 25.3      [12-13-19 @ 08:11]  HbA1c 6.4      [02-12-20 @ 17:08]  TSH 3.26      [04-26-20 @ 09:47]  Lipid: chol 158, , HDL 44, LDL 93      [08-05-20 @ 08:57]    HBsAb <3.0      [12-04-19 @ 08:33]  HBsAb Nonreact      [06-13-20 @ 10:35]  HBsAg Nonreact      [06-13-20 @ 10:35]  HBcAb Nonreact      [06-13-20 @ 10:35]  HCV 0.14, Nonreact      [12-04-19 @ 08:33]  HIV Nonreact      [10-23-19 @ 05:08]    SRAVAN: titer Negative, pattern --      [12-03-19 @ 09:01]  dsDNA <12      [02-05-18 @ 18:48]  C3 Complement 36      [11-27-19 @ 00:11]  C4 Complement 7      [11-27-19 @ 00:11]  ANCA: cANCA Negative, pANCA Negative, atypical ANCA Negative      [02-05-18 @ 18:48]  Syphilis Screen (Treponema Pallidum Ab) Negative      [12-04-19 @ 08:33]  Free Light Chains: kappa 10.93, lambda 3.76, ratio = 2.91      [12-06 @ 09:43]  Immunofixation Serum:    One Weak IgA Lambda  Band and One Weak IgG Lambda Band Identified    Reference Range: None Detected      [04-29-20 @ 08:20]  SPEP Interpretation: Two Weak Gamma-Migrating Paraproteins Identified      [04-29-20 @ 08:20]  Immunofixation Urine: Reference Range: None Detected      [12-04-19 @ 14:03]  UPEP Interpretation: Weak Gamma-Migrating Paraprotein Identified      [12-04-19 @ 14:03]

## 2020-11-27 NOTE — PROGRESS NOTE ADULT - ASSESSMENT
64 year old man type 2 diabetes, HLD, GERD, HFpEF with mild LV diastolic dysfunction, and decompensated JEAN cirrhosis, duodenal ulcer, GAVE and duodenal AVM s/p APC (last on 10/11/19) and SHEGN who is now s/p liver transplant (7/2/20) with post-op course c/b tacrolimus toxicity and hospital delirium improving after stopping CNI and replacing w/ everolimus. Patient now presents 11/20/20 with worsening LE swelling and right foot wound concerning for OM however no focus on infection on NM WBC scan 11/24/20 or evidence of bone infection intraoperatively.    # MISA, mild - Serum Cr 1.53 today, as compared to baseline 1.3-1.4. Suspect pre-renal etiology, less likely ATN.     # Right heel wound - Chronic non-healing right foot ulcer with probe to bone; suspected poor wound healing in setting of uncontrolled DM2 and immunosuppression. MRI R. foot 11/2 limited study; wound culture 11/21 growing Pseudomonas; no focus on infection on NM WBC scan 11/24/20; no evidence of bone or soft tissue infection intraoperative per Podiatry. Bone biopsy culture - pending.     # S/p liver transplant 7/2/20: Transaminases and markers of synthetic function are normal with minimal immunosuppression.  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   - IV antibiotics as per Transplant ID  - Bactrim SS PO daily and valganciclovir 450mg daily for ppx as per Transplant Surgery  - continue everolimus dosing by trough (currently 2mg q12); trend trough level daily, goal 3 ng/mL  - continue to hold MMF for active infection  - continue prednisone 5mg qd  - continue Lasix 20mg IV daily (equivalent to home Lasix 40 PO)  - daily CMP and INR for MELD-Na  - Transplant Hep to follow      Vivian Guardado PGY-5  Gastroenterology Fellow  Pager #[phone]  E-mail [email or web address]  Call [phone] to speak to answering service for on-call GI fellow 5pm-7am and weekends.

## 2020-11-28 DIAGNOSIS — D84.9 IMMUNODEFICIENCY, UNSPECIFIED: ICD-10-CM

## 2020-11-28 LAB
-  AMPICILLIN/SULBACTAM: SIGNIFICANT CHANGE UP
-  CEFAZOLIN: SIGNIFICANT CHANGE UP
-  CLINDAMYCIN: SIGNIFICANT CHANGE UP
-  ERYTHROMYCIN: SIGNIFICANT CHANGE UP
-  GENTAMICIN: SIGNIFICANT CHANGE UP
-  OXACILLIN: SIGNIFICANT CHANGE UP
-  PENICILLIN: SIGNIFICANT CHANGE UP
-  RIFAMPIN: SIGNIFICANT CHANGE UP
-  TETRACYCLINE: SIGNIFICANT CHANGE UP
-  TRIMETHOPRIM/SULFAMETHOXAZOLE: SIGNIFICANT CHANGE UP
-  VANCOMYCIN: SIGNIFICANT CHANGE UP
ALBUMIN SERPL ELPH-MCNC: 3.2 G/DL — LOW (ref 3.3–5)
ALP SERPL-CCNC: 104 U/L — SIGNIFICANT CHANGE UP (ref 40–120)
ALT FLD-CCNC: 26 U/L — SIGNIFICANT CHANGE UP (ref 10–45)
ANION GAP SERPL CALC-SCNC: 12 MMOL/L — SIGNIFICANT CHANGE UP (ref 5–17)
AST SERPL-CCNC: 22 U/L — SIGNIFICANT CHANGE UP (ref 10–40)
BILIRUB SERPL-MCNC: 0.1 MG/DL — LOW (ref 0.2–1.2)
BUN SERPL-MCNC: 42 MG/DL — HIGH (ref 7–23)
CALCIUM SERPL-MCNC: 8.6 MG/DL — SIGNIFICANT CHANGE UP (ref 8.4–10.5)
CHLORIDE SERPL-SCNC: 104 MMOL/L — SIGNIFICANT CHANGE UP (ref 96–108)
CO2 SERPL-SCNC: 23 MMOL/L — SIGNIFICANT CHANGE UP (ref 22–31)
CREAT SERPL-MCNC: 1.29 MG/DL — SIGNIFICANT CHANGE UP (ref 0.5–1.3)
EVEROLIMUS, WHOLE BLOOD RESULT: 4.1 NG/ML — SIGNIFICANT CHANGE UP (ref 3–?)
EVEROLIMUS, WHOLE BLOOD RESULT: 5 NG/ML — SIGNIFICANT CHANGE UP (ref 3–?)
GLUCOSE SERPL-MCNC: 233 MG/DL — HIGH (ref 70–99)
HCT VFR BLD CALC: 27.2 % — LOW (ref 39–50)
HGB BLD-MCNC: 8.8 G/DL — LOW (ref 13–17)
MAGNESIUM SERPL-MCNC: 2.1 MG/DL — SIGNIFICANT CHANGE UP (ref 1.6–2.6)
MCHC RBC-ENTMCNC: 29.2 PG — SIGNIFICANT CHANGE UP (ref 27–34)
MCHC RBC-ENTMCNC: 32.4 GM/DL — SIGNIFICANT CHANGE UP (ref 32–36)
MCV RBC AUTO: 90.4 FL — SIGNIFICANT CHANGE UP (ref 80–100)
METHOD TYPE: SIGNIFICANT CHANGE UP
NRBC # BLD: 0 /100 WBCS — SIGNIFICANT CHANGE UP (ref 0–0)
PHOSPHATE SERPL-MCNC: 2.8 MG/DL — SIGNIFICANT CHANGE UP (ref 2.5–4.5)
PLATELET # BLD AUTO: 170 K/UL — SIGNIFICANT CHANGE UP (ref 150–400)
POTASSIUM SERPL-MCNC: 4.5 MMOL/L — SIGNIFICANT CHANGE UP (ref 3.5–5.3)
POTASSIUM SERPL-SCNC: 4.5 MMOL/L — SIGNIFICANT CHANGE UP (ref 3.5–5.3)
PROT ?TM UR-MCNC: 43 MG/DL — HIGH (ref 0–12)
PROT SERPL-MCNC: 5.6 G/DL — LOW (ref 6–8.3)
RBC # BLD: 3.01 M/UL — LOW (ref 4.2–5.8)
RBC # FLD: 13.2 % — SIGNIFICANT CHANGE UP (ref 10.3–14.5)
SODIUM SERPL-SCNC: 139 MMOL/L — SIGNIFICANT CHANGE UP (ref 135–145)
UUN UR-MCNC: 713 MG/DL — SIGNIFICANT CHANGE UP
WBC # BLD: 2.79 K/UL — LOW (ref 3.8–10.5)
WBC # FLD AUTO: 2.79 K/UL — LOW (ref 3.8–10.5)

## 2020-11-28 PROCEDURE — 99232 SBSQ HOSP IP/OBS MODERATE 35: CPT

## 2020-11-28 PROCEDURE — 99232 SBSQ HOSP IP/OBS MODERATE 35: CPT | Mod: GC,24

## 2020-11-28 RX ORDER — ALBUMIN HUMAN 25 %
100 VIAL (ML) INTRAVENOUS EVERY 8 HOURS
Refills: 0 | Status: DISCONTINUED | OUTPATIENT
Start: 2020-11-28 | End: 2020-11-29

## 2020-11-28 RX ADMIN — INSULIN GLARGINE 8 UNIT(S): 100 INJECTION, SOLUTION SUBCUTANEOUS at 22:11

## 2020-11-28 RX ADMIN — Medication 1 APPLICATION(S): at 16:12

## 2020-11-28 RX ADMIN — Medication 5 MILLIGRAM(S): at 05:30

## 2020-11-28 RX ADMIN — Medication 2: at 08:19

## 2020-11-28 RX ADMIN — CEFEPIME 100 MILLIGRAM(S): 1 INJECTION, POWDER, FOR SOLUTION INTRAMUSCULAR; INTRAVENOUS at 05:29

## 2020-11-28 RX ADMIN — Medication 50 MILLILITER(S): at 22:17

## 2020-11-28 RX ADMIN — VALGANCICLOVIR 450 MILLIGRAM(S): 450 TABLET, FILM COATED ORAL at 12:41

## 2020-11-28 RX ADMIN — Medication 10 UNIT(S): at 18:21

## 2020-11-28 RX ADMIN — EVEROLIMUS 2 MILLIGRAM(S): 10 TABLET ORAL at 08:26

## 2020-11-28 RX ADMIN — Medication 10 UNIT(S): at 19:42

## 2020-11-28 RX ADMIN — Medication 4: at 18:21

## 2020-11-28 RX ADMIN — Medication 20 MILLIGRAM(S): at 05:30

## 2020-11-28 RX ADMIN — Medication 10 UNIT(S): at 08:19

## 2020-11-28 RX ADMIN — EVEROLIMUS 2 MILLIGRAM(S): 10 TABLET ORAL at 20:23

## 2020-11-28 RX ADMIN — Medication 650 MILLIGRAM(S): at 00:59

## 2020-11-28 RX ADMIN — Medication 1 TABLET(S): at 12:41

## 2020-11-28 RX ADMIN — Medication 50 MILLILITER(S): at 17:03

## 2020-11-28 RX ADMIN — PANTOPRAZOLE SODIUM 40 MILLIGRAM(S): 20 TABLET, DELAYED RELEASE ORAL at 05:30

## 2020-11-28 RX ADMIN — CEFEPIME 100 MILLIGRAM(S): 1 INJECTION, POWDER, FOR SOLUTION INTRAMUSCULAR; INTRAVENOUS at 19:41

## 2020-11-28 RX ADMIN — MAGNESIUM OXIDE 400 MG ORAL TABLET 800 MILLIGRAM(S): 241.3 TABLET ORAL at 08:59

## 2020-11-28 RX ADMIN — TAMSULOSIN HYDROCHLORIDE 0.4 MILLIGRAM(S): 0.4 CAPSULE ORAL at 22:11

## 2020-11-28 RX ADMIN — Medication 10 UNIT(S): at 12:40

## 2020-11-28 RX ADMIN — Medication 2: at 12:40

## 2020-11-28 RX ADMIN — Medication 81 MILLIGRAM(S): at 12:41

## 2020-11-28 NOTE — PROGRESS NOTE ADULT - SUBJECTIVE AND OBJECTIVE BOX
--------------------------------------------------------------------------------  Chief Complaint:  I feel better.    24 hour events/subjective:  Reviewed.      PAST HISTORY  --------------------------------------------------------------------------------  No significant changes to PMH, PSH, FHx, SHx, unless otherwise noted    ALLERGIES & MEDICATIONS  --------------------------------------------------------------------------------  Allergies    codeine (Anaphylaxis)    Intolerances      Standing Inpatient Medications  albumin human 25% IVPB 100 milliLiter(s) IV Intermittent every 8 hours  aspirin enteric coated 81 milliGRAM(s) Oral daily  cefepime   IVPB 2000 milliGRAM(s) IV Intermittent every 12 hours  collagenase Ointment 1 Application(s) Topical daily  dextrose 40% Gel 15 Gram(s) Oral once  dextrose 5%. 1000 milliLiter(s) IV Continuous <Continuous>  dextrose 5%. 1000 milliLiter(s) IV Continuous <Continuous>  dextrose 5%. 1000 milliLiter(s) IV Continuous <Continuous>  dextrose 50% Injectable 25 Gram(s) IV Push once  dextrose 50% Injectable 12.5 Gram(s) IV Push once  dextrose 50% Injectable 25 Gram(s) IV Push once  everolimus (ZORTRESS) 2 milliGRAM(s) Oral <User Schedule>  furosemide   Injectable 20 milliGRAM(s) IV Push daily  glucagon  Injectable 1 milliGRAM(s) IntraMuscular once  insulin glargine Injectable (LANTUS) 8 Unit(s) SubCutaneous at bedtime  insulin lispro (ADMELOG) corrective regimen sliding scale   SubCutaneous Before meals and at bedtime  insulin lispro Injectable (ADMELOG) 10 Unit(s) SubCutaneous three times a day before meals  magnesium oxide 800 milliGRAM(s) Oral with breakfast  pantoprazole    Tablet 40 milliGRAM(s) Oral before breakfast  predniSONE   Tablet 5 milliGRAM(s) Oral daily  tamsulosin 0.4 milliGRAM(s) Oral at bedtime  trimethoprim   80 mG/sulfamethoxazole 400 mG 1 Tablet(s) Oral daily  valGANciclovir 450 milliGRAM(s) Oral daily    PRN Inpatient Medications  acetaminophen   Tablet .. 650 milliGRAM(s) Oral every 6 hours PRN      REVIEW OF SYSTEMS  --------------------------------------------------------------------------------  Gen: No fatigue, fevers/chills, weakness  Skin: No rashes  Head/Eyes/Ears/Mouth: No headache; No sore throat  Respiratory: No dyspnea, cough,   CV: No chest pain, PND, orthopnea  GI: No abdominal pain, diarrhea, constipation, nausea, vomiting  Transplant: No pain  : No increased frequency, dysuria, hematuria, nocturia  MSK: No joint pain/swelling; no back pain; no edema  Neuro: No dizziness/lightheadedness, weakness, seizures, numbness, tingling  Psych: No significant nervousness, anxiety, stress, depression    All other systems were reviewed and are negative, except as noted.    VITALS/PHYSICAL EXAM  --------------------------------------------------------------------------------  T(C): 36.8 (11-28-20 @ 09:00), Max: 36.9 (11-28-20 @ 01:00)  HR: 88 (11-28-20 @ 09:00) (71 - 88)  BP: 111/67 (11-28-20 @ 09:00) (111/67 - 126/73)  RR: 18 (11-28-20 @ 09:00) (16 - 18)  SpO2: 97% (11-28-20 @ 09:00) (97% - 99%)  Wt(kg): --        11-27-20 @ 07:01  -  11-28-20 @ 07:00  --------------------------------------------------------  IN: 1080 mL / OUT: 2100 mL / NET: -1020 mL      Physical Exam:  	Gen: NAD, well-appearing  	HEENT: PERRL, supple neck, clear oropharynx  	Pulm: CTA B/L  	CV: RRR, S1S2; no rub  	Abd: +BS, soft, nontender/nondistended                    : No suprapubic tenderness  	LE: Right foot dressing on.  	Neuro: No focal deficits, intact gait  	Psych: Normal affect and mood  	Skin: Warm, without rashes      LABS/STUDIES  --------------------------------------------------------------------------------              8.8    2.79  >-----------<  170      [11-28-20 @ 06:06]              27.2     139  |  104  |  42  ----------------------------<  233      [11-28-20 @ 06:06]  4.5   |  23  |  1.29        Ca     8.6     [11-28-20 @ 06:06]      Mg     2.1     [11-28-20 @ 06:06]      Phos  2.8     [11-28-20 @ 06:06]    TPro  5.6  /  Alb  3.2  /  TBili  0.1  /  DBili  x   /  AST  22  /  ALT  26  /  AlkPhos  104  [11-28-20 @ 06:06]          Creatinine Trend:  SCr 1.29 [11-28 @ 06:06]  SCr 1.56 [11-27 @ 06:09]  SCr 1.31 [11-26 @ 06:02]  SCr 1.36 [11-25 @ 06:12]  SCr 1.44 [11-23 @ 05:58]              Urinalysis - [11-27-20 @ 14:30]      Color Light Yellow / Appearance Clear / SG 1.020 / pH 6.5      Gluc Trace / Ketone Negative  / Bili Negative / Urobili Negative       Blood Small / Protein 30 mg/dL / Leuk Est Moderate / Nitrite Negative      RBC 14 / WBC 31 / Hyaline 5 / Gran  / Sq Epi  / Non Sq Epi 2 / Bacteria Negative    Urine Creatinine 108      [11-27-20 @ 14:30]  Urine Protein 43      [11-27-20 @ 17:13]  Urine Sodium 94      [11-27-20 @ 14:30]  Urine Urea Nitrogen 713      [11-27-20 @ 17:13]  Urine Potassium 54      [11-27-20 @ 14:30]  Urine Osmolality 561      [11-27-20 @ 14:30]    Iron 160, TIBC Unable to calculate Test Repeated, %sat Unable to calculate Test Repeated      [06-12-20 @ 09:04]  Ferritin 493      [04-29-20 @ 08:20]  PTH -- (Ca 10.3)      [12-13-19 @ 08:11]   12  Vitamin D (25OH) 25.3      [12-13-19 @ 08:11]  HbA1c 6.4      [02-12-20 @ 17:08]  TSH 3.26      [04-26-20 @ 09:47]  Lipid: chol 158, , HDL 44, LDL 93      [08-05-20 @ 08:57]      < from: US Duplex Kidneys (11.27.20 @ 16:57) >  IMPRESSION:    No hydronephrosis.    No evidence of a hemodynamically significant left renal artery stenosis.    Nonvisualization of the right renal artery origin. No distal tardus parvus waveforms to suggest a hemodynamically significant stenosis.    Patent renal veins.      Culture - Tissue with Gram Stain (11.25.20 @ 00:55)    Gram Stain:   No polymorphonuclear leukocytes seen per low power field  No organisms seen per oil power field    Specimen Source: .Tissue Other    Culture Results:   Growth in fluid media only Staphylococcus epidermidis  "Susceptibilities not performed"      < end of copied text >    Culture - Other (11.21.20 @ 01:03)    -  Amikacin: S <=16    -  Aztreonam: S <=4    -  Cefepime: S <=2    -  Ceftazidime: S <=1    -  Ciprofloxacin: R 2    -  Gentamicin: S <=2    -  Imipenem: I 4    -  Levofloxacin: I 4    -  Meropenem: S <=1    -  Piperacillin/Tazobactam: S <=8    -  Tobramycin: S <=2    Specimen Source: Skin wound    Culture Results:   Few Pseudomonas aeruginosa  Normal skin ninfa isolated    Organism Identification: Pseudomonas aeruginosa    Organism: Pseudomonas aeruginosa    Method Type: LEONARDO

## 2020-11-28 NOTE — PROGRESS NOTE ADULT - SUBJECTIVE AND OBJECTIVE BOX
Chief Complaint:  Patient is a 64y old  Male who presents with a chief complaint of R heel wound (2020 11:31)      Interval Events:     Bone tissue cx () growing GPC in clusters  Patient has no complaints.     Allergies:  codeine (Anaphylaxis)      Hospital Medications:  acetaminophen   Tablet .. 650 milliGRAM(s) Oral every 6 hours PRN  aspirin enteric coated 81 milliGRAM(s) Oral daily  cefepime   IVPB 2000 milliGRAM(s) IV Intermittent every 12 hours  collagenase Ointment 1 Application(s) Topical daily  dextrose 40% Gel 15 Gram(s) Oral once  dextrose 5%. 1000 milliLiter(s) IV Continuous <Continuous>  dextrose 5%. 1000 milliLiter(s) IV Continuous <Continuous>  dextrose 5%. 1000 milliLiter(s) IV Continuous <Continuous>  dextrose 50% Injectable 25 Gram(s) IV Push once  dextrose 50% Injectable 12.5 Gram(s) IV Push once  dextrose 50% Injectable 25 Gram(s) IV Push once  everolimus (ZORTRESS) 2 milliGRAM(s) Oral <User Schedule>  furosemide   Injectable 20 milliGRAM(s) IV Push daily  glucagon  Injectable 1 milliGRAM(s) IntraMuscular once  insulin glargine Injectable (LANTUS) 8 Unit(s) SubCutaneous at bedtime  insulin lispro (ADMELOG) corrective regimen sliding scale   SubCutaneous Before meals and at bedtime  insulin lispro Injectable (ADMELOG) 10 Unit(s) SubCutaneous three times a day before meals  magnesium oxide 800 milliGRAM(s) Oral with breakfast  pantoprazole    Tablet 40 milliGRAM(s) Oral before breakfast  predniSONE   Tablet 5 milliGRAM(s) Oral daily  tamsulosin 0.4 milliGRAM(s) Oral at bedtime  trimethoprim   80 mG/sulfamethoxazole 400 mG 1 Tablet(s) Oral daily  valGANciclovir 450 milliGRAM(s) Oral daily      PMHX/PSHX:  GIB (gastrointestinal bleeding)    GERD with esophagitis    Hepatic encephalopathy    Obesity    Fatty liver disease, nonalcoholic    Renal stones    Hypertension    Neuropathy    Hypercholesteremia    Diabetes    S/P cholecystectomy    No significant past surgical history        Family history:  Family history of type 2 diabetes mellitus    Family history of hypertension    Family history of stomach cancer    No pertinent family history in first degree relatives        ROS:     General:  No weight loss, fevers, chills, night sweats, fatigue   Eyes:  No vision changes  ENT:  No sore throat, pain, runny nose  CV:  No chest pain, palpitations, dizziness   Resp:  No SOB, cough, wheezing  GI:  See HPI  :  No burning with urination, hematuria  Muscle:  No pain, weakness  Neuro:  No weakness/tingling, memory problems  Psych:  No fatigue, insomnia, mood problems, depression  Heme:  No easy bruisability  Skin:  No rash, edema      PHYSICAL EXAM:     GENERAL:  Non-toxic, no distress, resting comfortably in bed  HEENT:  NC/AT,  conjunctivae clear, sclera anicteric  CHEST:  No increased effort w/ respirations  HEART:  Regular rhythm & rate  ABDOMEN:  Soft, non-tender, non-distended  EXTREMITIES:  2+ pitting edema RLE +R. foot covered in ACE wrap  SKIN:  No rash/erythema/ecchymoses/petechiae/jaundice   NEURO:  Alert, orientedx3      Vital Signs:  Vital Signs Last 24 Hrs  T(C): 36.5 (2020 05:00), Max: 37.3 (2020 01:00)  T(F): 97.7 (:00), Max: 99.1 (2020 01:00)  HR: 82 (:00) (80 - 88)  BP: 123/67 (2020 05:00) (103/55 - 147/77)  BP(mean): --  RR: 18 (:) (18 - 18)  SpO2: 98% (2020 05:00) (98% - 99%)  Daily     Daily Weight in k (:00)    LABS:                        8.4    2.53  )-----------( 166      ( 2020 06:09 )             25.7         138  |  105  |  39<H>  ----------------------------<  273<H>  4.8   |  23  |  1.56<H>    Ca    8.6      2020 06:09  Phos  3.3       Mg     2.2         TPro  5.4<L>  /  Alb  3.1<L>  /  TBili  <0.1<L>  /  DBili  x   /  AST    /  ALT    /  AlkPhos  111      LIVER FUNCTIONS - ( 2020 06:09 )  Alb: 3.1 g/dL / Pro: 5.4 g/dL / ALK PHOS: 111 U/L / ALT: 27 U/L / AST: 27 U/L / GGT: x                   Imaging:             Chief Complaint:  Patient is a 64y old  Male who presents with a chief complaint of R heel wound (2020 11:31)      Interval Events:   - Pt denies abd pain, nv/d/f/c, leg pain       Allergies:  codeine (Anaphylaxis)      Hospital Medications:  acetaminophen   Tablet .. 650 milliGRAM(s) Oral every 6 hours PRN  aspirin enteric coated 81 milliGRAM(s) Oral daily  cefepime   IVPB 2000 milliGRAM(s) IV Intermittent every 12 hours  collagenase Ointment 1 Application(s) Topical daily  dextrose 40% Gel 15 Gram(s) Oral once  dextrose 5%. 1000 milliLiter(s) IV Continuous <Continuous>  dextrose 5%. 1000 milliLiter(s) IV Continuous <Continuous>  dextrose 5%. 1000 milliLiter(s) IV Continuous <Continuous>  dextrose 50% Injectable 25 Gram(s) IV Push once  dextrose 50% Injectable 12.5 Gram(s) IV Push once  dextrose 50% Injectable 25 Gram(s) IV Push once  everolimus (ZORTRESS) 2 milliGRAM(s) Oral <User Schedule>  furosemide   Injectable 20 milliGRAM(s) IV Push daily  glucagon  Injectable 1 milliGRAM(s) IntraMuscular once  insulin glargine Injectable (LANTUS) 8 Unit(s) SubCutaneous at bedtime  insulin lispro (ADMELOG) corrective regimen sliding scale   SubCutaneous Before meals and at bedtime  insulin lispro Injectable (ADMELOG) 10 Unit(s) SubCutaneous three times a day before meals  magnesium oxide 800 milliGRAM(s) Oral with breakfast  pantoprazole    Tablet 40 milliGRAM(s) Oral before breakfast  predniSONE   Tablet 5 milliGRAM(s) Oral daily  tamsulosin 0.4 milliGRAM(s) Oral at bedtime  trimethoprim   80 mG/sulfamethoxazole 400 mG 1 Tablet(s) Oral daily  valGANciclovir 450 milliGRAM(s) Oral daily      PMHX/PSHX:  GIB (gastrointestinal bleeding)    GERD with esophagitis    Hepatic encephalopathy    Obesity    Fatty liver disease, nonalcoholic    Renal stones    Hypertension    Neuropathy    Hypercholesteremia    Diabetes    S/P cholecystectomy    No significant past surgical history        Family history:  Family history of type 2 diabetes mellitus    Family history of hypertension    Family history of stomach cancer    No pertinent family history in first degree relatives        ROS:     General:  No weight loss, fevers, chills, night sweats, fatigue   Eyes:  No vision changes  ENT:  No sore throat, pain, runny nose  CV:  No chest pain, palpitations, dizziness   Resp:  No SOB, cough, wheezing  GI:  See HPI  :  No burning with urination, hematuria  Muscle:  No pain, weakness  Neuro:  No weakness/tingling, memory problems  Psych:  No fatigue, insomnia, mood problems, depression  Heme:  No easy bruisability  Skin:  No rash, edema      PHYSICAL EXAM:     GENERAL:  Non-toxic, no distress, resting comfortably in bed  HEENT:  NC/AT,  conjunctivae clear, sclera anicteric  CHEST:  No increased effort w/ respirations  HEART:  Regular rhythm & rate  ABDOMEN:  Soft, non-tender, non-distended  EXTREMITIES:  2+ pitting edema RLE +R. foot covered in ACE wrap  SKIN:  No rash/erythema/ecchymoses/petechiae/jaundice   NEURO:  Alert, orientedx3    Vital Signs:  Vital Signs Last 24 Hrs  T(C): 36.8 (2020 09:00), Max: 36.9 (2020 01:00)  T(F): 98.2 (:00), Max: 98.4 (2020 01:00)  HR: 88 (:00) (71 - 88)  BP: 111/67 (2020 09:00) (111/67 - 126/73)  BP(mean): --  RR: 18 (:) (16 - 18)  SpO2: 97% (:00) (97% - 99%)  Daily     Daily Weight in k.7 (2020 05:00)      LABS:                        8.8    2.79  )-----------( 170      ( 2020 06:06 )             27.2         139  |  104  |  42<H>  ----------------------------<  233<H>  4.5   |  23  |  1.29    Ca    8.6      2020 06:06  Phos  2.8       Mg     2.1         TPro  5.6<L>  /  Alb  3.2<L>  /  TBili  0.1<L>  /  DBili  x   /  AST  22  /  ALT  26  /  AlkPhos  104      LIVER FUNCTIONS - ( 2020 06:06 )  Alb: 3.2 g/dL / Pro: 5.6 g/dL / ALK PHOS: 104 U/L / ALT: 26 U/L / AST: 22 U/L / GGT: x             Urinalysis Basic - ( 2020 14:30 )    Color: Light Yellow / Appearance: Clear / S.020 / pH: x  Gluc: x / Ketone: Negative  / Bili: Negative / Urobili: Negative   Blood: x / Protein: 30 mg/dL / Nitrite: Negative   Leuk Esterase: Moderate / RBC: 14 /hpf / WBC 31 /HPF   Sq Epi: x / Non Sq Epi: 2 /hpf / Bacteria: Negative          Imaging:

## 2020-11-28 NOTE — PROGRESS NOTE ADULT - ASSESSMENT
s/p liver transplant  osteomyelitis     cont regular diet  IV antibiotics per ID  rejection regimen per transplant service  albumin infusions as per transplant hepatology   oob as tolerated  podiatry recs noted

## 2020-11-28 NOTE — PROGRESS NOTE ADULT - ATTENDING COMMENTS
Liver Transplant recipient with elevated creatinine  Creatinine trend noted, now downtrending  Comorbidities reviewed.  Patient seen, examined and reviewed available clinical and lab data including history,  progress notes and consult notes.  Reviewed immunosuppression and noted creatinine trend, urine studies and renal imaging.  Reviewed medication regimen for glycemic control.  Suggestions:  Discussed renoprotective measures including glycemic control, avoiding nephrotoxic agents  Follow blood chemistry  Check PVR  Will follow  I was present during and reviewed clinical and lab data as well as assessment and plan as documented. Please contact if any additional questions with any change in clinical condition or on availability of any additional information or reports.

## 2020-11-28 NOTE — PROGRESS NOTE ADULT - SUBJECTIVE AND OBJECTIVE BOX
Present:   Patient seen and examined with multidisciplinary team including Transplant Surgeon: Dr. Ackerman,  Transplant Nephrologist: Dr. Salvador,  NP Flora Akers and unit RN during am rounds.  Disciplines not in attendance will be notified of the plan.     HPI: 64M with IDDM, HLD, obesity, HFpEF with mild LV diastolic dysfunction, MGUS, chronic anemia with a history of duodenal ulcer 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      Underwent OLT on 2020, post op coure c/b VRE bacteremia tx with linezolid and delirium likely in setting of CNI toxicity (FK discontinued/everolimus initiated ).  Known history of non healing R heel ulcer. Followed by podiatry outpatient Dr. Patel, had recent debridement wound (10/20) cx grew ec faecalis, pseudomonas, enterococcus. Tx with cipro.  No improvement post debridement and PO antibiotic treatment.    Admitted with non healing R heel ulcer. MRI c/w early OM. Podiatry and ID consulted. Wound cultured. Broadened to meropenem.   MMF held. Everolimus dose reduced.     Interval Events:   - afebrile, stable VSS  - S/p right heal debridement , bone felt healthy, biopsy taken.  - Podiatry following: wound dressing changes   -  Wound cultures: pseudomonas,  currently on cefepime, Vanco d/cd  - OR cult now Pos staph epidermis   - Renal U/S done- results noted  - Sr Cr downtrended this am 1.29    Potential Discharge date: pending clinical improvement     Education:  Medications    Plan of care:  See Below      MEDICATIONS  (STANDING):  albumin human 25% IVPB 100 milliLiter(s) IV Intermittent every 8 hours  aspirin enteric coated 81 milliGRAM(s) Oral daily  cefepime   IVPB 2000 milliGRAM(s) IV Intermittent every 12 hours  collagenase Ointment 1 Application(s) Topical daily  dextrose 40% Gel 15 Gram(s) Oral once  dextrose 5%. 1000 milliLiter(s) (50 mL/Hr) IV Continuous <Continuous>  dextrose 5%. 1000 milliLiter(s) (100 mL/Hr) IV Continuous <Continuous>  dextrose 5%. 1000 milliLiter(s) (100 mL/Hr) IV Continuous <Continuous>  dextrose 50% Injectable 25 Gram(s) IV Push once  dextrose 50% Injectable 12.5 Gram(s) IV Push once  dextrose 50% Injectable 25 Gram(s) IV Push once  everolimus (ZORTRESS) 2 milliGRAM(s) Oral <User Schedule>  furosemide   Injectable 20 milliGRAM(s) IV Push daily  glucagon  Injectable 1 milliGRAM(s) IntraMuscular once  insulin glargine Injectable (LANTUS) 8 Unit(s) SubCutaneous at bedtime  insulin lispro (ADMELOG) corrective regimen sliding scale   SubCutaneous Before meals and at bedtime  insulin lispro Injectable (ADMELOG) 10 Unit(s) SubCutaneous three times a day before meals  magnesium oxide 800 milliGRAM(s) Oral with breakfast  pantoprazole    Tablet 40 milliGRAM(s) Oral before breakfast  predniSONE   Tablet 5 milliGRAM(s) Oral daily  tamsulosin 0.4 milliGRAM(s) Oral at bedtime  trimethoprim   80 mG/sulfamethoxazole 400 mG 1 Tablet(s) Oral daily  valGANciclovir 450 milliGRAM(s) Oral daily    MEDICATIONS  (PRN):  acetaminophen   Tablet .. 650 milliGRAM(s) Oral every 6 hours PRN Moderate Pain (4 - 6)      PAST MEDICAL & SURGICAL HISTORY:  GIB (gastrointestinal bleeding)    GERD with esophagitis  Gastritis &amp; Non Bleeding Ulcers    Hepatic encephalopathy    Obesity    Fatty liver disease, nonalcoholic    Renal stones  25 years ago    Hypertension    Neuropathy    Hypercholesteremia    Diabetes    S/P cholecystectomy        Vital Signs Last 24 Hrs  T(C): 36.8 (2020 09:00), Max: 37 (2020 14:47)  T(F): 98.2 (2020 09:00), Max: 98.6 (2020 14:47)  HR: 88 (2020 09:00) (71 - 88)  BP: 111/67 (2020 09:00) (111/67 - 131/66)  BP(mean): --  RR: 18 (2020 09:00) (16 - 18)  SpO2: 97% (2020 09:00) (97% - 100%)    I&O's Summary    2020 07:01  -  2020 07:00  --------------------------------------------------------  IN: 1080 mL / OUT: 2100 mL / NET: -1020 mL          LABS:                        8.8    2.79  )-----------( 170      ( 2020 06:06 )             27.2         139  |  104  |  42<H>  ----------------------------<  233<H>  4.5   |  23  |  1.29    Ca    8.6      2020 06:06  Phos  2.8       Mg     2.1         TPro  5.6<L>  /  Alb  3.2<L>  /  TBili  0.1<L>  /  DBili  x   /  AST  22  /  ALT  26  /  AlkPhos  104        Urinalysis Basic - ( 2020 14:30 )    Color: Light Yellow / Appearance: Clear / S.020 / pH: x  Gluc: x / Ketone: Negative  / Bili: Negative / Urobili: Negative   Blood: x / Protein: 30 mg/dL / Nitrite: Negative   Leuk Esterase: Moderate / RBC: 14 /hpf / WBC 31 /HPF   Sq Epi: x / Non Sq Epi: 2 /hpf / Bacteria: Negative      CAPILLARY BLOOD GLUCOSE      POCT Blood Glucose.: 188 mg/dL (2020 08:13)  POCT Blood Glucose.: 256 mg/dL (2020 21:28)  POCT Blood Glucose.: 135 mg/dL (2020 17:36)  POCT Blood Glucose.: 156 mg/dL (2020 14:01)    LIVER FUNCTIONS - ( 2020 06:06 )  Alb: 3.2 g/dL / Pro: 5.6 g/dL / ALK PHOS: 104 U/L / ALT: 26 U/L / AST: 22 U/L / GGT: x             Culture - Tissue with Gram Stain (collected 20 @ 00:55)  Source: .Tissue Other  Gram Stain (20 @ 04:24):    No polymorphonuclear leukocytes seen per low power field    No organisms seen per oil power field  Preliminary Report (20 @ 00:10):    Growth in fluid media only Staphylococcus epidermidis    "Susceptibilities not performed"          Cultures:    Culture - Tissue with Gram Stain (collected 20 @ 00:55)  Source: .Tissue Other  Gram Stain (20 @ 04:24):    No polymorphonuclear leukocytes seen per low power field    No organisms seen per oil power field  Preliminary Report (20 @ 00:10):    Growth in fluid media only Staphylococcus epidermidis    "Susceptibilities not performed"        Review of systems  Gen: No weight changes, fatigue, fevers/chills, weakness  Skin: R heel wound  Head/Eyes/Ears/Mouth: No headache; Normal hearing; Normal vision w/o blurriness; No sinus pain/discomfort, sore throat  Respiratory: No dyspnea, cough, wheezing, hemoptysis  CV: No chest pain, PND, orthopnea  GI: no abdominal pain  : No increased frequency, dysuria, hematuria, nocturia  MSK: No joint pain/swelling; no back pain; + LE edema  Neuro: No dizziness/lightheadedness, weakness, seizures, numbness, tingling  Heme: No easy bruising or bleeding  Endo: No heat/cold intolerance  Psych: No significant nervousness, anxiety, stress, depression  All other systems were reviewed and are negative, except as noted.    Physical Exam:   GENERAL:  No acute distress  HEENT:  Normocephalic/atraumatic, no scleral icterus  CHEST:  Clear to auscultation bilaterally, no wheezes/rales/ronchi, no accessory muscle use  HEART:  Regular rate and rhythm, no murmurs/rubs/gallops  ABDOMEN:  Soft, non-tender, non-distended,  well healed incision  EXTREMITIES: RLE with large 4x4 cm ulcer, with overlying granulation tissue and surrounding erythema, some visible bone. +1 BLE edema.  SKIN:  No rash/erythema/ecchymoses/petechiae/wounds/abscess/warm/dry  NEURO:  Alert and oriented x 3, no asterixis

## 2020-11-28 NOTE — PROGRESS NOTE ADULT - ATTENDING COMMENTS
Hepatology Staff: Yomi Medina MD    I saw and examined the patient along with  Dr. Ram 11-28-20 @ 10:17  Patient Medical Record, hosptial course was reviewed and summarized as below:    Vitals: Vital Signs Last 24 Hrs  T(C): 36.8 (28 Nov 2020 09:00), Max: 37 (27 Nov 2020 14:47)  T(F): 98.2 (28 Nov 2020 09:00), Max: 98.6 (27 Nov 2020 14:47)  HR: 88 (28 Nov 2020 09:00) (71 - 88)  BP: 111/67 (28 Nov 2020 09:00) (111/67 - 131/66)    RR: 18 (28 Nov 2020 09:00) (16 - 18)  SpO2: 97% (28 Nov 2020 09:00) (97% - 100%)    Labs:Creatinine, Serum: 1.29 mg/dL (11-28-20 @ 06:06)  Bilirubin Total, Serum: 0.1 mg/dL (11-28-20 @ 06:06)      I/O: I&O's Summary    27 Nov 2020 07:01  -  28 Nov 2020 07:00  --------------------------------------------------------  IN: 1080 mL / OUT: 2100 mL / NET: -1020 mL      Nutritional Status:   Albumin, Serum: 3.2 g/dL (11-28-20 @ 06:06)    Last 24 hour events: Stable course in the hospital. Cellcept on hold. s/p debridement on 24th of Nov, remains on IV Cefepime    Recommendations: Continue with IV Cefepime. Keep on Everolimus 2 mg ( level- 3.8). Cont with Prednisone 5 mg daily, and held CellCept. Considering hypoalbuminemia, and edema in the LE, I would like to recommend Iv Albumin 25% 25 g q 8hrs x 3 days. Wound care per surgical team.  Keep on Valcyte and Bactrim for prophylaxis.  Plan discussed with Primary team.

## 2020-11-28 NOTE — PROGRESS NOTE ADULT - ASSESSMENT
Assessment and Plan:   · Assessment	  64M with IDDM, HLD, obesity, HFpEF with mild LV diastolic dysfunction, MGUS, chronic anemia with a history of duodenal ulcer 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  Known history of non healing R heel ulcer. Followed by podiatry outpatient Dr. Patel, had recent debridement wound (10/20) cx grew ec faecalis, pseudomonas, enterococcus. Tx with Cipro without improvement now admitted for IV antibiotics and further intervention from podiatry      [] MISA  resolved  U/A, urine lytes, urine protein/ cr    PVR after void  Renal U/S- done  Transplant nephrology following       [] R heel ulcer/Osteomyelitis  - MRI c/w early OM, Tagged WCC scan negative.  - S/p OR for right heal debridement 11/24  - 11/21 Wound culture growing few pseudomonas.  Continue Cefepime     - OR cult GPC- speciated Staph epidermis - f/u ID recs  - Physical therapy eval    [] s/p OLT with good graft function   - Immuno: Everolimus 2/2, MMF on hold in setting of infection, Pred 5  - PPx: bactrim/valcyte (currently on 450mg QD decreased from 900 QD 2/2 low WBC)  - Daily CBC, BMP  - Continue Lasix 20mg IV daily for peripheral edema  - Albumin level 3.2 start Albumin 25% 100 ml Q8 hr x 2 days as per Transplant hepatology recs      [] DM  - Glucose controlled  - Fingersticks ac and qhs  - Continue glargine 15u qhs and  lispro 10u with meals  - SSI coverage

## 2020-11-28 NOTE — PROGRESS NOTE ADULT - ASSESSMENT
64 year old man type 2 diabetes, HLD, GERD, HFpEF with mild LV diastolic dysfunction, and decompensated JEAN cirrhosis, duodenal ulcer, GAVE and duodenal AVM s/p APC (last on 10/11/19) and SHENG who is now s/p liver transplant (7/2/20) with post-op course c/b tacrolimus toxicity and hospital delirium improving after stopping CNI and replacing w/ everolimus. Patient now presents 11/20/20 with worsening LE swelling and right foot wound concerning for OM however no focus on infection on NM WBC scan 11/24/20 or evidence of bone infection intraoperatively.    # MISA, mild - Serum Cr 1.53 today, as compared to baseline 1.3-1.4. Suspect pre-renal etiology, less likely ATN.     # Right heel wound - Chronic non-healing right foot ulcer with probe to bone; suspected poor wound healing in setting of uncontrolled DM2 and immunosuppression. MRI R. foot 11/2 limited study; wound culture 11/21 growing Pseudomonas; no focus on infection on NM WBC scan 11/24/20; no evidence of bone or soft tissue infection intraoperative per Podiatry. Bone biopsy culture - pending.     # S/p liver transplant 7/2/20: Transaminases and markers of synthetic function are normal with minimal immunosuppression.  Recipient Info:   ABO: A  CMV:  Neg  EBV Positive  Donor:  Donor ID:  [id number] Match: [id number]  Age: 29 ABO:  A1 High Risk:   No COD: Cardiovascular  Anti CMV positive, EBV IgG- positive  HepBcAb-neg, Hepatitis C-DANA- neg, Hepatitis C ab-neg    # Sacral decubitus ulcer: stage II without signs of infection   # Type 2 diabetes:  A1c 8.1% and blood sugars elevated above goal -likely contributing to poor wound healing   # SHENG     Recommendations:  - can give 1L 0.9NS (100mL/hr for 10 hours)   - follow up speciation of GPCC in bone tissue cx.   - IV antibiotics as per Transplant ID  - Bactrim SS PO daily and valganciclovir 450mg daily for ppx as per Transplant Surgery  - continue everolimus dosing by trough (currently 2mg q12); trend trough level daily, goal 3 ng/mL  - continue to hold MMF for active infection  - continue prednisone 5mg qd  - continue Lasix 20mg IV daily (equivalent to home Lasix 40 PO)  - daily CMP and INR for MELD-Na  - Transplant Hep to follow       64 year old man type 2 diabetes, HLD, GERD, HFpEF with mild LV diastolic dysfunction, and decompensated JEAN cirrhosis, duodenal ulcer, GAVE and duodenal AVM s/p APC (last on 10/11/19) and SHENG who is now s/p liver transplant (7/2/20) with post-op course c/b tacrolimus toxicity and hospital delirium improving after stopping CNI and replacing w/ everolimus. Patient now presents 11/20/20 with worsening LE swelling and right foot wound concerning for OM however no focus on infection on NM WBC scan 11/24/20 or evidence of bone infection intraoperatively.    # MISA, mild - Serum Cr improving to 1.29 from 1.53, baseline 1.3-1.4. Suspect pre-renal etiology, less likely ATN.     # Right heel wound - Chronic non-healing right foot ulcer with probe to bone; suspected poor wound healing in setting of uncontrolled DM2 and immunosuppression. MRI R. foot 11/2 limited study; wound culture 11/21 growing Pseudomonas; no focus on infection on NM WBC scan 11/24/20; no evidence of bone or soft tissue infection intraoperative per Podiatry. Bone biopsy culture - pending.     # S/p liver transplant 7/2/20: Transaminases and markers of synthetic function are normal with minimal immunosuppression.  Recipient Info:   ABO: A  CMV:  Neg  EBV Positive  Donor:  Donor ID:  [id number] Match: [id number]  Age: 29 ABO:  A1 High Risk:   No COD: Cardiovascular  Anti CMV positive, EBV IgG- positive  HepBcAb-neg, Hepatitis C-DANA- neg, Hepatitis C ab-neg    # Sacral decubitus ulcer: stage II without signs of infection   # Type 2 diabetes:  A1c 8.1% and blood sugars elevated above goal -likely contributing to poor wound healing   # SHENG     Recommendations:  - Please give albumin 100 q6hr today to help with LE edema   - Follow up speciation of GPCC in bone tissue cx.   - IV antibiotics as per Transplant ID  - Bactrim SS PO daily and valganciclovir 450mg daily for ppx as per Transplant Surgery  - continue everolimus dosing by trough (currently 2mg q12); trend trough level daily, goal 3 ng/mL  - continue to hold MMF for active infection  - continue prednisone 5mg qd  - continue Lasix 20mg IV daily (equivalent to home Lasix 40 PO)  - daily CMP and INR for MELD-Na  - Transplant Hep to follow      Thank you for involving us in the care of this patient, please reach out if any further questions.     Ky Ram MD  Gastroenterology Fellow, PGY4    Available on [employer] Teams  [phone] ([facility])  [id number] ([facility])  Please contact on call fellow weekdays after 5pm-7am and weekends: [phone]

## 2020-11-29 LAB
ALBUMIN SERPL ELPH-MCNC: 3.7 G/DL — SIGNIFICANT CHANGE UP (ref 3.3–5)
ALP SERPL-CCNC: 95 U/L — SIGNIFICANT CHANGE UP (ref 40–120)
ALT FLD-CCNC: 28 U/L — SIGNIFICANT CHANGE UP (ref 10–45)
ANION GAP SERPL CALC-SCNC: 9 MMOL/L — SIGNIFICANT CHANGE UP (ref 5–17)
AST SERPL-CCNC: 21 U/L — SIGNIFICANT CHANGE UP (ref 10–40)
BASOPHILS # BLD AUTO: 0.04 K/UL — SIGNIFICANT CHANGE UP (ref 0–0.2)
BASOPHILS NFR BLD AUTO: 1.3 % — SIGNIFICANT CHANGE UP (ref 0–2)
BILIRUB SERPL-MCNC: 0.2 MG/DL — SIGNIFICANT CHANGE UP (ref 0.2–1.2)
BUN SERPL-MCNC: 40 MG/DL — HIGH (ref 7–23)
CALCIUM SERPL-MCNC: 9 MG/DL — SIGNIFICANT CHANGE UP (ref 8.4–10.5)
CHLORIDE SERPL-SCNC: 105 MMOL/L — SIGNIFICANT CHANGE UP (ref 96–108)
CO2 SERPL-SCNC: 25 MMOL/L — SIGNIFICANT CHANGE UP (ref 22–31)
CREAT SERPL-MCNC: 1.41 MG/DL — HIGH (ref 0.5–1.3)
CULTURE RESULTS: SIGNIFICANT CHANGE UP
EOSINOPHIL # BLD AUTO: 0.07 K/UL — SIGNIFICANT CHANGE UP (ref 0–0.5)
EOSINOPHIL NFR BLD AUTO: 2.3 % — SIGNIFICANT CHANGE UP (ref 0–6)
EVEROLIMUS, WHOLE BLOOD RESULT: 4.2 NG/ML — SIGNIFICANT CHANGE UP (ref 3–?)
GLUCOSE SERPL-MCNC: 158 MG/DL — HIGH (ref 70–99)
HCT VFR BLD CALC: 26.2 % — LOW (ref 39–50)
HGB BLD-MCNC: 8.3 G/DL — LOW (ref 13–17)
IMM GRANULOCYTES NFR BLD AUTO: 0.7 % — SIGNIFICANT CHANGE UP (ref 0–1.5)
LYMPHOCYTES # BLD AUTO: 1.23 K/UL — SIGNIFICANT CHANGE UP (ref 1–3.3)
LYMPHOCYTES # BLD AUTO: 40.5 % — SIGNIFICANT CHANGE UP (ref 13–44)
MAGNESIUM SERPL-MCNC: 2.2 MG/DL — SIGNIFICANT CHANGE UP (ref 1.6–2.6)
MCHC RBC-ENTMCNC: 28.9 PG — SIGNIFICANT CHANGE UP (ref 27–34)
MCHC RBC-ENTMCNC: 31.7 GM/DL — LOW (ref 32–36)
MCV RBC AUTO: 91.3 FL — SIGNIFICANT CHANGE UP (ref 80–100)
MONOCYTES # BLD AUTO: 0.3 K/UL — SIGNIFICANT CHANGE UP (ref 0–0.9)
MONOCYTES NFR BLD AUTO: 9.9 % — SIGNIFICANT CHANGE UP (ref 2–14)
NEUTROPHILS # BLD AUTO: 1.38 K/UL — LOW (ref 1.8–7.4)
NEUTROPHILS NFR BLD AUTO: 45.3 % — SIGNIFICANT CHANGE UP (ref 43–77)
NRBC # BLD: 0 /100 WBCS — SIGNIFICANT CHANGE UP (ref 0–0)
ORGANISM # SPEC MICROSCOPIC CNT: SIGNIFICANT CHANGE UP
ORGANISM # SPEC MICROSCOPIC CNT: SIGNIFICANT CHANGE UP
PHOSPHATE SERPL-MCNC: 2.8 MG/DL — SIGNIFICANT CHANGE UP (ref 2.5–4.5)
PLATELET # BLD AUTO: 163 K/UL — SIGNIFICANT CHANGE UP (ref 150–400)
POTASSIUM SERPL-MCNC: 4.5 MMOL/L — SIGNIFICANT CHANGE UP (ref 3.5–5.3)
POTASSIUM SERPL-SCNC: 4.5 MMOL/L — SIGNIFICANT CHANGE UP (ref 3.5–5.3)
PROT SERPL-MCNC: 5.9 G/DL — LOW (ref 6–8.3)
RBC # BLD: 2.87 M/UL — LOW (ref 4.2–5.8)
RBC # FLD: 13.2 % — SIGNIFICANT CHANGE UP (ref 10.3–14.5)
SARS-COV-2 RNA SPEC QL NAA+PROBE: SIGNIFICANT CHANGE UP
SODIUM SERPL-SCNC: 139 MMOL/L — SIGNIFICANT CHANGE UP (ref 135–145)
SPECIMEN SOURCE: SIGNIFICANT CHANGE UP
WBC # BLD: 3.04 K/UL — LOW (ref 3.8–10.5)
WBC # FLD AUTO: 3.04 K/UL — LOW (ref 3.8–10.5)

## 2020-11-29 PROCEDURE — 99232 SBSQ HOSP IP/OBS MODERATE 35: CPT | Mod: GC,24

## 2020-11-29 PROCEDURE — 99232 SBSQ HOSP IP/OBS MODERATE 35: CPT

## 2020-11-29 RX ORDER — FUROSEMIDE 40 MG
20 TABLET ORAL DAILY
Refills: 0 | Status: DISCONTINUED | OUTPATIENT
Start: 2020-11-30 | End: 2020-12-02

## 2020-11-29 RX ORDER — INSULIN LISPRO 100/ML
VIAL (ML) SUBCUTANEOUS AT BEDTIME
Refills: 0 | Status: DISCONTINUED | OUTPATIENT
Start: 2020-11-29 | End: 2020-12-02

## 2020-11-29 RX ORDER — INSULIN LISPRO 100/ML
VIAL (ML) SUBCUTANEOUS
Refills: 0 | Status: DISCONTINUED | OUTPATIENT
Start: 2020-11-29 | End: 2020-12-02

## 2020-11-29 RX ADMIN — EVEROLIMUS 2 MILLIGRAM(S): 10 TABLET ORAL at 07:55

## 2020-11-29 RX ADMIN — Medication 2: at 07:54

## 2020-11-29 RX ADMIN — Medication 10 UNIT(S): at 07:54

## 2020-11-29 RX ADMIN — CEFEPIME 100 MILLIGRAM(S): 1 INJECTION, POWDER, FOR SOLUTION INTRAMUSCULAR; INTRAVENOUS at 17:33

## 2020-11-29 RX ADMIN — TAMSULOSIN HYDROCHLORIDE 0.4 MILLIGRAM(S): 0.4 CAPSULE ORAL at 22:21

## 2020-11-29 RX ADMIN — Medication 10 UNIT(S): at 17:56

## 2020-11-29 RX ADMIN — INSULIN GLARGINE 8 UNIT(S): 100 INJECTION, SOLUTION SUBCUTANEOUS at 22:21

## 2020-11-29 RX ADMIN — Medication 5 MILLIGRAM(S): at 06:30

## 2020-11-29 RX ADMIN — MAGNESIUM OXIDE 400 MG ORAL TABLET 800 MILLIGRAM(S): 241.3 TABLET ORAL at 07:53

## 2020-11-29 RX ADMIN — Medication 1 APPLICATION(S): at 17:54

## 2020-11-29 RX ADMIN — Medication 4: at 13:27

## 2020-11-29 RX ADMIN — CEFEPIME 100 MILLIGRAM(S): 1 INJECTION, POWDER, FOR SOLUTION INTRAMUSCULAR; INTRAVENOUS at 06:30

## 2020-11-29 RX ADMIN — VALGANCICLOVIR 450 MILLIGRAM(S): 450 TABLET, FILM COATED ORAL at 13:15

## 2020-11-29 RX ADMIN — Medication 20 MILLIGRAM(S): at 06:30

## 2020-11-29 RX ADMIN — PANTOPRAZOLE SODIUM 40 MILLIGRAM(S): 20 TABLET, DELAYED RELEASE ORAL at 06:30

## 2020-11-29 RX ADMIN — Medication 10 UNIT(S): at 13:27

## 2020-11-29 RX ADMIN — Medication 81 MILLIGRAM(S): at 13:14

## 2020-11-29 RX ADMIN — Medication 1 TABLET(S): at 13:15

## 2020-11-29 RX ADMIN — Medication 50 MILLILITER(S): at 06:37

## 2020-11-29 RX ADMIN — Medication 4: at 17:55

## 2020-11-29 RX ADMIN — EVEROLIMUS 2 MILLIGRAM(S): 10 TABLET ORAL at 20:18

## 2020-11-29 NOTE — PROGRESS NOTE ADULT - SUBJECTIVE AND OBJECTIVE BOX
INTERVAL HPI/OVERNIGHT EVENTS:    no acute overnight events      MEDICATIONS  (STANDING):  aspirin enteric coated 81 milliGRAM(s) Oral daily  cefepime   IVPB 2000 milliGRAM(s) IV Intermittent every 12 hours  collagenase Ointment 1 Application(s) Topical daily  Dakins Solution - 1/2 Strength 1 Application(s) Topical daily  dextrose 40% Gel 15 Gram(s) Oral once  dextrose 5%. 1000 milliLiter(s) (50 mL/Hr) IV Continuous <Continuous>  dextrose 5%. 1000 milliLiter(s) (100 mL/Hr) IV Continuous <Continuous>  dextrose 5%. 1000 milliLiter(s) (100 mL/Hr) IV Continuous <Continuous>  dextrose 50% Injectable 25 Gram(s) IV Push once  dextrose 50% Injectable 12.5 Gram(s) IV Push once  dextrose 50% Injectable 25 Gram(s) IV Push once  everolimus (ZORTRESS) 2 milliGRAM(s) Oral <User Schedule>  furosemide   Injectable 20 milliGRAM(s) IV Push daily  glucagon  Injectable 1 milliGRAM(s) IntraMuscular once  insulin glargine Injectable (LANTUS) 8 Unit(s) SubCutaneous at bedtime  insulin lispro (ADMELOG) corrective regimen sliding scale   SubCutaneous every 6 hours  insulin lispro Injectable (ADMELOG) 10 Unit(s) SubCutaneous three times a day before meals  magnesium oxide 800 milliGRAM(s) Oral with breakfast  pantoprazole    Tablet 40 milliGRAM(s) Oral before breakfast  predniSONE   Tablet 5 milliGRAM(s) Oral daily  tamsulosin 0.4 milliGRAM(s) Oral at bedtime  trimethoprim   80 mG/sulfamethoxazole 400 mG 1 Tablet(s) Oral daily  valGANciclovir 450 milliGRAM(s) Oral daily  vancomycin  IVPB 1250 milliGRAM(s) IV Intermittent daily    MEDICATIONS  (PRN):  acetaminophen   Tablet .. 650 milliGRAM(s) Oral every 6 hours PRN Temp greater or equal to 38C (100.4F), Mild Pain (1 - 3)      Allergies    codeine (Anaphylaxis)    Intolerances        Review of Systems:    General:  No wt loss, fevers, chills, night sweats,fatigue,   Eyes:  Good vision, no reported pain  ENT:  No sore throat, pain, runny nose, dysphagia  CV:  No pain, palpitatioins, hypo/hypertension  Resp:  No dyspnea, cough, tachypnea, wheezing  GI:  No pain, No nausea, No vomiting, No diarrhea, No constipatiion, No weight loss, No fever, No pruritis, No rectal bleeding, No tarry stools, No dysphagia,  :  No pain, bleeding, incontinence, nocturia  Muscle:  No pain, weakness  Neuro:  No weakness, tingling, memory problems  Psych:  No fatigue, insomnia, mood problems, depression  Endocrine:  No polyuria, polydypsia, cold/heat intolerance  Heme:  No petechiae, ecchymosis, easy bruisability  Skin:  No rash, tattoos, scars, edema      Vital Signs Last 24 Hrs  T(C): 37 (24 Nov 2020 06:31), Max: 37 (24 Nov 2020 01:00)  T(F): 98.6 (24 Nov 2020 06:31), Max: 98.6 (24 Nov 2020 01:00)  HR: 75 (24 Nov 2020 06:31) (75 - 87)  BP: 119/63 (24 Nov 2020 06:31) (119/63 - 150/68)  BP(mean): --  RR: 18 (24 Nov 2020 06:31) (18 - 18)  SpO2: 98% (24 Nov 2020 06:31) (98% - 100%)    PHYSICAL EXAM:    Constitutional: NAD  HEENT: EOMI, throat clear  Neck: No LAD, supple  Gastrointestinal: BS+, obese, soft, NT/ND  Extremities: + edema, R foot wrap   Neurological: A/O x 3, no focal deficits        LABS:                        8.9    2.16  )-----------( 160      ( 24 Nov 2020 06:15 )             27.7     11-23    140  |  106  |  25<H>  ----------------------------<  145<H>  4.4   |  25  |  1.44<H>    Ca    8.9      23 Nov 2020 05:58  Phos  3.2     11-24  Mg     2.0     11-24    TPro  5.8<L>  /  Alb  3.2<L>  /  TBili  0.1<L>  /  DBili  x   /  AST  15  /  ALT  15  /  AlkPhos  90  11-23    PT/INR - ( 24 Nov 2020 06:15 )   PT: 11.6 sec;   INR: 0.97 ratio         PTT - ( 24 Nov 2020 06:15 )  PTT:30.1 sec      RADIOLOGY & ADDITIONAL TESTS:

## 2020-11-29 NOTE — PROGRESS NOTE ADULT - SUBJECTIVE AND OBJECTIVE BOX
Transplant Surgery Multidisciplinary Progress Note    OLT 7/2/2020    Present:   Patient seen and examined with multidisciplinary team including Transplant Surgeon: Dr. Ackerman,  Transplant Nephrologist: Dr. Salvador,  OLY Bingham and unit RN during am rounds.  Disciplines not in attendance will be notified of the plan.     HPI: 64M with IDDM, HLD, obesity, HFpEF with mild LV diastolic dysfunction, MGUS, chronic anemia with a history of duodenal ulcer 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  Known history of non healing R heel ulcer. Followed by podiatry outpatient Dr. Patel, had recent debridement wound (10/20) cx grew ec faecalis, pseudomonas, enterococcus. Tx with cipro.  No improvement post debridement and PO antibiotic treatment.    Admitted with non healing R heel ulcer.   ·	R wound cx on admission grew Pseudomonas resistant to cipro; started on meropenem 11/20-11/23, vanco 11/23-11/25, Cefepime 11/23  ·	MRI c/w early OM. Podiatry and ID consulted. Broadened to meropenem. MMF held on admission, Everolimus reduced  ·	WBC scan -neg for OM  ·	OR (11/24)-> wound debridement (cxs grew staph epi), bone bx (path pending)   ·	MISA: renal us no hydro; patent vessels  ·	Neutropenia: valcyte dose reduced 450mg daily (was on 900mg daily CMV +/-), MMF held in setting of infection     Interval Events:   - afebrile, stable VSS, on cefepime  - POD 5 s/p R heal wound debridement (wound cx grew staph epi); path pending   - SCr 1.4; good u/o 1.4L    Potential Discharge date: pending clinical improvement     Education:  Medications    Plan of care:  See Below    MEDICATIONS  (STANDING):  albumin human 25% IVPB 100 milliLiter(s) IV Intermittent every 8 hours  aspirin enteric coated 81 milliGRAM(s) Oral daily  cefepime   IVPB 2000 milliGRAM(s) IV Intermittent every 12 hours  collagenase Ointment 1 Application(s) Topical daily  dextrose 40% Gel 15 Gram(s) Oral once  dextrose 5%. 1000 milliLiter(s) (50 mL/Hr) IV Continuous <Continuous>  dextrose 5%. 1000 milliLiter(s) (100 mL/Hr) IV Continuous <Continuous>  dextrose 5%. 1000 milliLiter(s) (100 mL/Hr) IV Continuous <Continuous>  dextrose 50% Injectable 25 Gram(s) IV Push once  dextrose 50% Injectable 12.5 Gram(s) IV Push once  dextrose 50% Injectable 25 Gram(s) IV Push once  everolimus (ZORTRESS) 2 milliGRAM(s) Oral <User Schedule>  furosemide   Injectable 20 milliGRAM(s) IV Push daily  glucagon  Injectable 1 milliGRAM(s) IntraMuscular once  insulin glargine Injectable (LANTUS) 8 Unit(s) SubCutaneous at bedtime  insulin lispro (ADMELOG) corrective regimen sliding scale   SubCutaneous Before meals and at bedtime  insulin lispro Injectable (ADMELOG) 10 Unit(s) SubCutaneous three times a day before meals  magnesium oxide 800 milliGRAM(s) Oral with breakfast  pantoprazole    Tablet 40 milliGRAM(s) Oral before breakfast  predniSONE   Tablet 5 milliGRAM(s) Oral daily  tamsulosin 0.4 milliGRAM(s) Oral at bedtime  trimethoprim   80 mG/sulfamethoxazole 400 mG 1 Tablet(s) Oral daily  valGANciclovir 450 milliGRAM(s) Oral daily    MEDICATIONS  (PRN):  acetaminophen   Tablet .. 650 milliGRAM(s) Oral every 6 hours PRN Moderate Pain (4 - 6)      PAST MEDICAL & SURGICAL HISTORY:  GIB (gastrointestinal bleeding)  GERD with esophagitis  Gastritis &amp; Non Bleeding Ulcers  Hepatic encephalopathy  Obesity  Fatty liver disease, nonalcoholic  Renal stones  25 years ago  Hypertension  Neuropathy  Hypercholesteremia  Diabetes  S/P cholecystectomy    Vital Signs Last 24 Hrs  T(C): 36.8 (29 Nov 2020 04:49), Max: 37.2 (28 Nov 2020 22:19)  T(F): 98.2 (29 Nov 2020 04:49), Max: 99 (28 Nov 2020 22:19)  HR: 73 (29 Nov 2020 04:49) (73 - 84)  BP: 127/67 (29 Nov 2020 04:49) (119/65 - 135/64)  BP(mean): --  RR: 18 (29 Nov 2020 04:49) (18 - 18)  SpO2: 99% (29 Nov 2020 04:49) (98% - 99%)    I&O's Summary    28 Nov 2020 07:01  -  29 Nov 2020 07:00  --------------------------------------------------------  IN: 0 mL / OUT: 3300 mL / NET: -3300 mL    29 Nov 2020 07:01  -  29 Nov 2020 11:29  --------------------------------------------------------  IN: 240 mL / OUT: 400 mL / NET: -160 mL                        8.3    3.04  )-----------( 163      ( 29 Nov 2020 06:22 )             26.2     11-29    139  |  105  |  40<H>  ----------------------------<  158<H>  4.5   |  25  |  1.41<H>    Ca    9.0      29 Nov 2020 06:21  Phos  2.8     11-29  Mg     2.2     11-29    TPro  5.9<L>  /  Alb  3.7  /  TBili  0.2  /  DBili  x   /  AST  21  /  ALT  28  /  AlkPhos  95  11-29      ulture - Tissue with Gram Stain (collected 11-25-20 @ 00:55)  Source: .Tissue Other  Gram Stain (11-25-20 @ 04:24):    No polymorphonuclear leukocytes seen per low power field    No organisms seen per oil power field  Preliminary Report (11-28-20 @ 00:10):    Growth in fluid media only Staphylococcus epidermidis    "Susceptibilities not performed"  Organism: Staphylococcus epidermidis (11-28-20 @ 17:45)  Organism: Staphylococcus epidermidis (11-28-20 @ 17:45)    Review of systems  Gen: No weight changes, fatigue, fevers/chills, weakness  Skin: R heel wound  Head/Eyes/Ears/Mouth: No headache; Normal hearing; Normal vision w/o blurriness; No sinus pain/discomfort, sore throat  Respiratory: No dyspnea, cough, wheezing, hemoptysis  CV: No chest pain, PND, orthopnea  GI: no abdominal pain  : No increased frequency, dysuria, hematuria, nocturia  MSK: No joint pain/swelling; no back pain; + LE edema  Neuro: No dizziness/lightheadedness, weakness, seizures, numbness, tingling  Heme: No easy bruising or bleeding  Endo: No heat/cold intolerance  Psych: No significant nervousness, anxiety, stress, depression  All other systems were reviewed and are negative, except as noted.    Physical Exam:   GENERAL:  No acute distress  HEENT:  Normocephalic/atraumatic, no scleral icterus  CHEST:  Clear to auscultation bilaterally, no wheezes/rales/ronchi, no accessory muscle use  HEART:  Regular rate and rhythm, no murmurs/rubs/gallops  ABDOMEN:  Soft, non-tender, non-distended,  well healed incision  EXTREMITIES: RLE with large 4x4 cm ulcer, with overlying granulation tissue and surrounding erythema, some visible bone. +1 BLE edema.  SKIN:  No rash/erythema/ecchymoses/petechiae/wounds/abscess/warm/dry  NEURO:  Alert and oriented x 3, no asterixis

## 2020-11-29 NOTE — PROGRESS NOTE ADULT - ATTENDING COMMENTS
Liver Transplant recipient with functioning allograft  Elevated creatinine, MISA  Right heel wound with infection, on antibiotic therapy  Creatinine trend noted  Comorbidities reviewed. DM, HTN, BPH, h/o nephrolithiasis  Patient seen, examined and reviewed available clinical and lab data including history,  progress notes and consult notes.  Reviewed immunosuppresion and allograft function including urine out put, creatinine trend, urine studies and renal and bladder imaging.  Reviewed medication regimen for glycemic control and blood pressure control  Suggestions:  1. Check PVR  2. Maintain optimized glucose and blood pressure control for renal function preservation; avoid nephrotoxic agents  3. Change lasix to 20 mg PO  4. Check PVR volume  Will follow  I was present during and reviewed clinical and lab data as well as assessment and plan as documented . Please contact if any additional questions with any change in clinical condition or on availability of any additional information or reports. Liver Transplant recipient with functioning allograft  Elevated creatinine, MISA  Right heel wound with infection, on antibiotic therapy  Creatinine trend noted  Comorbidities reviewed. DM, HTN, BPH, h/o nephrolithiasis  Patient seen, examined and reviewed available clinical and lab data including history,  progress notes and consult notes.  Reviewed renal function including urine out put, creatinine trend, urine studies and renal and bladder imaging.  Reviewed medication regimen for glycemic control and blood pressure control  Suggestions:  1. Check PVR  2. Maintain optimized glucose and blood pressure control for renal function preservation; avoid nephrotoxic agents  3. Change lasix to 20 mg PO  4. Check PVR volume  Will follow  I was present during and reviewed clinical and lab data as well as assessment and plan as documented . Please contact if any additional questions with any change in clinical condition or on availability of any additional information or reports.

## 2020-11-29 NOTE — PROGRESS NOTE ADULT - ASSESSMENT
64M with IDDM, HLD, obesity, HFpEF with mild LV diastolic dysfunction, MGUS, chronic anemia with a history of duodenal ulcer 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 Known history of non healing R heel ulcer. Followed by podiatry outpatient Dr. Patel, had recent debridement wound (10/20) cx grew ec faecalis, pseudomonas, enterococcus. Tx with Cipro without improvement. Admitted with non healing R foot ulcer    [] R heel ulcer  - MRI c/w early OM, Tagged WBC scan negative.  - S/p OR for right heal debridement 11/24  - Wound culture on admission grew pseudomonas (resistant to cipro), wound cx from OR grew Staph epi; Path pending   - Podiatry following  - ID following: cont cefepime    [] MISA  - SCr 1.4; good u/o   - Change lasix to PO  - check PVR    [] s/p OLT with good graft function   - Immuno: Everolimus 2/2, MMF on hold in setting of infection, Pred 5  - PPx: bactrim/valcyte (currently on 450mg QD decreased from 900 QD 2/2 low WBC)  - Daily CBC, BMP  - Lasix 20mg PO daily   - Albumin level 3.2 start Albumin 25% 100 ml Q8 hr x 3 days as per Transplant hepatology recs    [] DM  - Glucose controlled  - Fingersticks ac and qhs  - Continue glargine 8u qhs and  lispro 10u with meals  - SSI coverage    [] Dispo   - dc planning pending path results and final ID recc's

## 2020-11-29 NOTE — PROGRESS NOTE ADULT - SUBJECTIVE AND OBJECTIVE BOX
Chief Complaint:  Patient is a 64y old  Male who presents with a chief complaint of R heel wound (2020 11:31)      Interval Events:   - Pt denies abd pain, nv/d/f/c, leg pain       Allergies:  codeine (Anaphylaxis)      Hospital Medications:  acetaminophen   Tablet .. 650 milliGRAM(s) Oral every 6 hours PRN  aspirin enteric coated 81 milliGRAM(s) Oral daily  cefepime   IVPB 2000 milliGRAM(s) IV Intermittent every 12 hours  collagenase Ointment 1 Application(s) Topical daily  dextrose 40% Gel 15 Gram(s) Oral once  dextrose 5%. 1000 milliLiter(s) IV Continuous <Continuous>  dextrose 5%. 1000 milliLiter(s) IV Continuous <Continuous>  dextrose 5%. 1000 milliLiter(s) IV Continuous <Continuous>  dextrose 50% Injectable 25 Gram(s) IV Push once  dextrose 50% Injectable 12.5 Gram(s) IV Push once  dextrose 50% Injectable 25 Gram(s) IV Push once  everolimus (ZORTRESS) 2 milliGRAM(s) Oral <User Schedule>  furosemide   Injectable 20 milliGRAM(s) IV Push daily  glucagon  Injectable 1 milliGRAM(s) IntraMuscular once  insulin glargine Injectable (LANTUS) 8 Unit(s) SubCutaneous at bedtime  insulin lispro (ADMELOG) corrective regimen sliding scale   SubCutaneous Before meals and at bedtime  insulin lispro Injectable (ADMELOG) 10 Unit(s) SubCutaneous three times a day before meals  magnesium oxide 800 milliGRAM(s) Oral with breakfast  pantoprazole    Tablet 40 milliGRAM(s) Oral before breakfast  predniSONE   Tablet 5 milliGRAM(s) Oral daily  tamsulosin 0.4 milliGRAM(s) Oral at bedtime  trimethoprim   80 mG/sulfamethoxazole 400 mG 1 Tablet(s) Oral daily  valGANciclovir 450 milliGRAM(s) Oral daily      PMHX/PSHX:  GIB (gastrointestinal bleeding)    GERD with esophagitis    Hepatic encephalopathy    Obesity    Fatty liver disease, nonalcoholic    Renal stones    Hypertension    Neuropathy    Hypercholesteremia    Diabetes    S/P cholecystectomy    No significant past surgical history        Family history:  Family history of type 2 diabetes mellitus    Family history of hypertension    Family history of stomach cancer    No pertinent family history in first degree relatives        ROS:     General:  No weight loss, fevers, chills, night sweats, fatigue   Eyes:  No vision changes  ENT:  No sore throat, pain, runny nose  CV:  No chest pain, palpitations, dizziness   Resp:  No SOB, cough, wheezing  GI:  See HPI  :  No burning with urination, hematuria  Muscle:  No pain, weakness  Neuro:  No weakness/tingling, memory problems  Psych:  No fatigue, insomnia, mood problems, depression  Heme:  No easy bruisability  Skin:  No rash, edema      PHYSICAL EXAM:     GENERAL:  Non-toxic, no distress, resting comfortably in bed  HEENT:  NC/AT,  conjunctivae clear, sclera anicteric  CHEST:  No increased effort w/ respirations  HEART:  Regular rhythm & rate  ABDOMEN:  Soft, non-tender, non-distended  EXTREMITIES:  2+ pitting edema RLE +R. foot covered in ACE wrap  SKIN:  No rash/erythema/ecchymoses/petechiae/jaundice   NEURO:  Alert, orientedx3    Vital Signs:  Vital Signs Last 24 Hrs  T(C): 36.8 (2020 09:00), Max: 36.9 (2020 01:00)  T(F): 98.2 (:00), Max: 98.4 (2020 01:00)  HR: 88 (:00) (71 - 88)  BP: 111/67 (2020 09:00) (111/67 - 126/73)  BP(mean): --  RR: 18 (:) (16 - 18)  SpO2: 97% (:00) (97% - 99%)  Daily     Daily Weight in k.7 (2020 05:00)      LABS:                        8.8    2.79  )-----------( 170      ( 2020 06:06 )             27.2         139  |  104  |  42<H>  ----------------------------<  233<H>  4.5   |  23  |  1.29    Ca    8.6      2020 06:06  Phos  2.8       Mg     2.1         TPro  5.6<L>  /  Alb  3.2<L>  /  TBili  0.1<L>  /  DBili  x   /  AST  22  /  ALT  26  /  AlkPhos  104      LIVER FUNCTIONS - ( 2020 06:06 )  Alb: 3.2 g/dL / Pro: 5.6 g/dL / ALK PHOS: 104 U/L / ALT: 26 U/L / AST: 22 U/L / GGT: x             Urinalysis Basic - ( 2020 14:30 )    Color: Light Yellow / Appearance: Clear / S.020 / pH: x  Gluc: x / Ketone: Negative  / Bili: Negative / Urobili: Negative   Blood: x / Protein: 30 mg/dL / Nitrite: Negative   Leuk Esterase: Moderate / RBC: 14 /hpf / WBC 31 /HPF   Sq Epi: x / Non Sq Epi: 2 /hpf / Bacteria: Negative          Imaging:             Chief Complaint:  Patient is a 64y old  Male who presents with a chief complaint of R heel wound (2020 11:31)      Interval Events:   - Pt denies abd pain, nv/d/f/c, leg pain   - Endorses he feels very bored in the hospital  - Awaiting bone biopsy cultures (wound cx + staph epi)      Allergies:  codeine (Anaphylaxis)      Hospital Medications:  acetaminophen   Tablet .. 650 milliGRAM(s) Oral every 6 hours PRN  aspirin enteric coated 81 milliGRAM(s) Oral daily  cefepime   IVPB 2000 milliGRAM(s) IV Intermittent every 12 hours  collagenase Ointment 1 Application(s) Topical daily  dextrose 40% Gel 15 Gram(s) Oral once  dextrose 5%. 1000 milliLiter(s) IV Continuous <Continuous>  dextrose 5%. 1000 milliLiter(s) IV Continuous <Continuous>  dextrose 5%. 1000 milliLiter(s) IV Continuous <Continuous>  dextrose 50% Injectable 25 Gram(s) IV Push once  dextrose 50% Injectable 12.5 Gram(s) IV Push once  dextrose 50% Injectable 25 Gram(s) IV Push once  everolimus (ZORTRESS) 2 milliGRAM(s) Oral <User Schedule>  furosemide   Injectable 20 milliGRAM(s) IV Push daily  glucagon  Injectable 1 milliGRAM(s) IntraMuscular once  insulin glargine Injectable (LANTUS) 8 Unit(s) SubCutaneous at bedtime  insulin lispro (ADMELOG) corrective regimen sliding scale   SubCutaneous Before meals and at bedtime  insulin lispro Injectable (ADMELOG) 10 Unit(s) SubCutaneous three times a day before meals  magnesium oxide 800 milliGRAM(s) Oral with breakfast  pantoprazole    Tablet 40 milliGRAM(s) Oral before breakfast  predniSONE   Tablet 5 milliGRAM(s) Oral daily  tamsulosin 0.4 milliGRAM(s) Oral at bedtime  trimethoprim   80 mG/sulfamethoxazole 400 mG 1 Tablet(s) Oral daily  valGANciclovir 450 milliGRAM(s) Oral daily      PMHX/PSHX:  GIB (gastrointestinal bleeding)    GERD with esophagitis    Hepatic encephalopathy    Obesity    Fatty liver disease, nonalcoholic    Renal stones    Hypertension    Neuropathy    Hypercholesteremia    Diabetes    S/P cholecystectomy    No significant past surgical history        Family history:  Family history of type 2 diabetes mellitus    Family history of hypertension    Family history of stomach cancer    No pertinent family history in first degree relatives        ROS:     General:  No weight loss, fevers, chills, night sweats, fatigue   Eyes:  No vision changes  ENT:  No sore throat, pain, runny nose  CV:  No chest pain, palpitations, dizziness   Resp:  No SOB, cough, wheezing  GI:  See HPI  :  No burning with urination, hematuria  Muscle:  No pain, weakness  Neuro:  No weakness/tingling, memory problems  Psych:  No fatigue, insomnia, mood problems, depression  Heme:  No easy bruisability  Skin:  No rash, edema      PHYSICAL EXAM:     GENERAL:  Non-toxic, no distress, resting comfortably in bed  HEENT:  NC/AT,  conjunctivae clear, sclera anicteric  CHEST:  No increased effort w/ respirations  HEART:  Regular rhythm & rate  ABDOMEN:  Soft, non-tender, non-distended  EXTREMITIES:  2+ pitting edema RLE +R. foot covered in ACE wrap  SKIN:  No rash/erythema/ecchymoses/petechiae/jaundice   NEURO:  Alert, orientedx3    Vital Signs:  Vital Signs Last 24 Hrs  T(C): 36.7 (2020 10:00), Max: 37.2 (2020 22:19)  T(F): 98.1 (2020 10:00), Max: 99 (2020 22:19)  HR: 77 (2020 10:00) (73 - 84)  BP: 133/75 (2020 10:00) (119/65 - 135/64)  BP(mean): --  RR: 18 (2020 10:00) (18 - 18)  SpO2: 99% (2020 10:00) (98% - 99%)  Daily     Daily Weight in k.1 (2020 06:28)        LABS:                        8.3    3.04  )-----------( 163      ( 2020 06:22 )             26.2         139  |  105  |  40<H>  ----------------------------<  158<H>  4.5   |  25  |  1.41<H>    Ca    9.0      2020 06:21  Phos  2.8       Mg     2.2         TPro  5.9<L>  /  Alb  3.7  /  TBili  0.2  /  DBili  x   /  AST  21  /  ALT  28  /  AlkPhos  95  11-29    LIVER FUNCTIONS - ( 2020 06:21 )  Alb: 3.7 g/dL / Pro: 5.9 g/dL / ALK PHOS: 95 U/L / ALT: 28 U/L / AST: 21 U/L / GGT: x             Urinalysis Basic - ( 2020 14:30 )    Color: Light Yellow / Appearance: Clear / S.020 / pH: x  Gluc: x / Ketone: Negative  / Bili: Negative / Urobili: Negative   Blood: x / Protein: 30 mg/dL / Nitrite: Negative   Leuk Esterase: Moderate / RBC: 14 /hpf / WBC 31 /HPF   Sq Epi: x / Non Sq Epi: 2 /hpf / Bacteria: Negative            Imaging:

## 2020-11-29 NOTE — CHART NOTE - NSCHARTNOTEFT_GEN_A_CORE
Nutrition Follow Up Note  Patient seen for nutrition follow-up and verbal request to speak to RD.     Chart reviewed, events noted. 64M with IDDM, HLD, obesity, HFpEF with mild LV diastolic dysfunction, MGUS, chronic anemia with a history of duodenal ulcer 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  Underwent liver tx on 2020, post op coure c/b VRE bacteremia tx with linezolid and delirium likely in setting of CNI toxicity (FK discontinued/everolimus initiated ).  - pt s/p R heel ulcer debridement , bone path pending  - Noted with MISA    Source: Pt, EMR    Diet : Consistent Carbohydrate + low sodium diet + Glucerna + Gee BID     Patient reports: Good PO intake/appetite, although the pt had many  complaints. RD and diet techs addressed all concerns. Pt complains about low-sodium diet, explained reasoning for diet and standard heart healthy sodium recommendations, pt verbalized understanding. Pt is allowed to have cheese during his admission and allowed double protein portions.     Education: explained low-sodium, consistent carbohydrate diet to pt including all parameters. Pt verbalized understanding.     PO intake: good, >75%     Source for PO intake: Pt    Daily Weight in k.1 (11-29), Weight in k.7 (11-28), Weight in k (11-27), Weight in k.8 (11-26), Weight in k.1 (11-25)  % Weight Change    Pertinent Medications: MEDICATIONS  (STANDING):  aspirin enteric coated 81 milliGRAM(s) Oral daily  cefepime   IVPB 2000 milliGRAM(s) IV Intermittent every 12 hours  collagenase Ointment 1 Application(s) Topical daily  dextrose 40% Gel 15 Gram(s) Oral once  dextrose 5%. 1000 milliLiter(s) (50 mL/Hr) IV Continuous <Continuous>  dextrose 5%. 1000 milliLiter(s) (100 mL/Hr) IV Continuous <Continuous>  dextrose 5%. 1000 milliLiter(s) (100 mL/Hr) IV Continuous <Continuous>  dextrose 50% Injectable 25 Gram(s) IV Push once  dextrose 50% Injectable 12.5 Gram(s) IV Push once  dextrose 50% Injectable 25 Gram(s) IV Push once  everolimus (ZORTRESS) 2 milliGRAM(s) Oral <User Schedule>  glucagon  Injectable 1 milliGRAM(s) IntraMuscular once  insulin glargine Injectable (LANTUS) 8 Unit(s) SubCutaneous at bedtime  insulin lispro (ADMELOG) corrective regimen sliding scale   SubCutaneous Before meals and at bedtime  insulin lispro Injectable (ADMELOG) 10 Unit(s) SubCutaneous three times a day before meals  magnesium oxide 800 milliGRAM(s) Oral with breakfast  pantoprazole    Tablet 40 milliGRAM(s) Oral before breakfast  predniSONE   Tablet 5 milliGRAM(s) Oral daily  tamsulosin 0.4 milliGRAM(s) Oral at bedtime  trimethoprim   80 mG/sulfamethoxazole 400 mG 1 Tablet(s) Oral daily  valGANciclovir 450 milliGRAM(s) Oral daily    MEDICATIONS  (PRN):  acetaminophen   Tablet .. 650 milliGRAM(s) Oral every 6 hours PRN Moderate Pain (4 - 6)    Pertinent Labs:  @ 06:21: Na 139, BUN 40<H>, Cr 1.41<H>, <H>, K+ 4.5, Phos 2.8, Mg 2.2, Alk Phos 95, ALT/SGPT 28, AST/SGOT 21, HbA1c --    Finger Sticks:  POCT Blood Glucose.: 212 mg/dL ( @ 13:24)  POCT Blood Glucose.: 163 mg/dL ( @ 07:48)  POCT Blood Glucose.: 150 mg/dL ( @ 21:44)  POCT Blood Glucose.: 214 mg/dL ( @ 18:12)      Skin per nursing documentation: Stage II pressure injury to sacrum, R heel unstageable wound  Edema: 1+ adriana. legs    Estimated Needs:   [x] no change since previous assessment  [ ] recalculated:     Previous Nutrition Diagnosis: increased protein needs  Nutrition Diagnosis is: ongoing, addressed with glucerna supplement, double protein portions    New Nutrition Diagnosis: None      Recommend  1) Continue current diet with Glucerna x 1 daily  2) Remove Gee, pt will not take it  3) Please add Vitamin C, Multivitamin for wound healing.  4) Pt made aware RD remains available for diet education as needed.    Monitoring and Evaluation:   Continue to monitor Nutritional intake, Tolerance to diet prescription, weights, labs, skin integrity    Soheila Yancey RD, CDN  Pager [phone]  RD remains available upon request and will follow up per protocol

## 2020-11-29 NOTE — PROGRESS NOTE ADULT - NSHPATTENDINGPLANDISCUSS_GEN_ALL_CORE
Patient seen on multidisciplinary rounds with Transplant surgeons, NP/PA, pharmacist, and nurse.
Transplant team
Transplant team

## 2020-11-29 NOTE — PROGRESS NOTE ADULT - ATTENDING COMMENTS
Hepatology Staff: Yomi Medina MD    I saw and examined the patient along with  Dr. Ram 11-29-20 @ 11:50.    Patient Medical Record, hosptial course was reviewed and summarized as below:    Vitals: Vital Signs Last 24 Hrs  T(C): 36.7 (29 Nov 2020 10:00), Max: 37.2 (28 Nov 2020 22:19)  T(F): 98.1 (29 Nov 2020 10:00), Max: 99 (28 Nov 2020 22:19)  HR: 77 (29 Nov 2020 10:00) (73 - 84)  BP: 133/75 (29 Nov 2020 10:00) (119/65 - 135/64)    RR: 18 (29 Nov 2020 10:00) (18 - 18)  SpO2: 99% (29 Nov 2020 10:00) (98% - 99%)  Medications:  IV Fluids: albumin human 25% IVPB 100 milliLiter(s) IV Intermittent every 8 hours      Labs:Creatinine, Serum: 1.41 mg/dL (11-29-20 @ 06:21)  Bilirubin Total, Serum: 0.2 mg/dL (11-29-20 @ 06:21)      I/O: I&O's Summary    28 Nov 2020 07:01  -  29 Nov 2020 07:00  --------------------------------------------------------  IN: 0 mL / OUT: 3300 mL / NET: -3300 mL    29 Nov 2020 07:01  -  29 Nov 2020 11:50  --------------------------------------------------------  IN: 240 mL / OUT: 400 mL / NET: -160 mL      Nutritional Status:   Albumin, Serum: 3.7 g/dL (11-29-20 @ 06:21)    Last 24 hour events:  Noted mild MISA ( Cr 1.43), stable anemia.    Recommendations: Cont with current treatment with Everolimus 2 mg buid, Prednisone 5 mg. Held CellCept. Coont with Cefepime ( started on 11/23). Keep on Lasix 20 mg, and cont with IV Albumin 25% 25 g q 8hrs.  Wound Cx Staph Epi (initial Cx was Pseudomonas resistant Cipro)  Await bone biopsy results.      Plan discussed with Primary team. Doing well  no complaints  renal function improving    o/e  afeb stable  no peripheral edema  right heel wound assessed - adaptic dressing in place, no surrounding cellulitis, no discharge    Plan  IV cefepime  await bone biopsy result

## 2020-11-29 NOTE — PROGRESS NOTE ADULT - ASSESSMENT
64 year old man type 2 diabetes, HLD, GERD, HFpEF with mild LV diastolic dysfunction, and decompensated JEAN cirrhosis, duodenal ulcer, GAVE and duodenal AVM s/p APC (last on 10/11/19) and SHENG who is now s/p liver transplant (7/2/20) with post-op course c/b tacrolimus toxicity and hospital delirium improving after stopping CNI and replacing w/ everolimus. Patient now presents 11/20/20 with worsening LE swelling and right foot wound concerning for OM however no focus on infection on NM WBC scan 11/24/20 or evidence of bone infection intraoperatively.    # MISA, mild - Serum Cr improving to 1.29 from 1.53, baseline 1.3-1.4. Suspect pre-renal etiology, less likely ATN.     # Right heel wound - Chronic non-healing right foot ulcer with probe to bone; suspected poor wound healing in setting of uncontrolled DM2 and immunosuppression. MRI R. foot 11/2 limited study; wound culture 11/21 growing Pseudomonas; no focus on infection on NM WBC scan 11/24/20; no evidence of bone or soft tissue infection intraoperative per Podiatry. Bone biopsy culture - pending.     # S/p liver transplant 7/2/20: Transaminases and markers of synthetic function are normal with minimal immunosuppression.  Recipient Info:   ABO: A  CMV:  Neg  EBV Positive  Donor:  Donor ID:  [id number] Match: [id number]  Age: 29 ABO:  A1 High Risk:   No COD: Cardiovascular  Anti CMV positive, EBV IgG- positive  HepBcAb-neg, Hepatitis C-DANA- neg, Hepatitis C ab-neg    # Sacral decubitus ulcer: stage II without signs of infection   # Type 2 diabetes:  A1c 8.1% and blood sugars elevated above goal -likely contributing to poor wound healing   # SHENG     Recommendations:  - Please give albumin 100 q6hr today to help with LE edema   - Follow up speciation of GPCC in bone tissue cx.   - IV antibiotics as per Transplant ID  - Bactrim SS PO daily and valganciclovir 450mg daily for ppx as per Transplant Surgery  - continue everolimus dosing by trough (currently 2mg q12); trend trough level daily, goal 3 ng/mL  - continue to hold MMF for active infection  - continue prednisone 5mg qd  - continue Lasix 20mg IV daily (equivalent to home Lasix 40 PO)  - daily CMP and INR for MELD-Na  - Transplant Hep to follow      Thank you for involving us in the care of this patient, please reach out if any further questions.     Ky Ram MD  Gastroenterology Fellow, PGY4    Available on [employer] Teams  [phone] ([facility])  [phone] ([facility])  Please contact on call fellow weekdays after 5pm-7am and weekends: [phone]   64 year old man type 2 diabetes, HLD, GERD, HFpEF with mild LV diastolic dysfunction, and decompensated JEAN cirrhosis, duodenal ulcer, GAVE and duodenal AVM s/p APC (last on 10/11/19) and SHENG who is now s/p liver transplant (7/2/20) with post-op course c/b tacrolimus toxicity and hospital delirium improving after stopping CNI and replacing w/ everolimus. Patient now presents 11/20/20 with worsening LE swelling and right foot wound concerning for OM however no focus on infection on NM WBC scan 11/24/20 or evidence of bone infection intraoperatively.    # MISA, mild (improving)- Serum Cr now at baseline (1.3-1.4). Suspect pre-renal etiology, less likely ATN.     # Right heel wound - Chronic non-healing right foot ulcer with probe to bone; suspected poor wound healing in setting of uncontrolled DM2 and immunosuppression. MRI R. foot 11/2 limited study; wound culture 11/21 growing Pseudomonas; no focus on infection on NM WBC scan 11/24/20; no evidence of bone or soft tissue infection intraoperative per Podiatry. Bone biopsy culture - pending.     # S/p liver transplant 7/2/20: Transaminases and markers of synthetic function are normal with minimal immunosuppression.  Recipient Info:   ABO: A  CMV:  Neg  EBV Positive  Donor:  Donor ID:  [id number] Match: [id number]  Age: 29 ABO:  A1 High Risk:   No COD: Cardiovascular  Anti CMV positive, EBV IgG- positive  HepBcAb-neg, Hepatitis C-DANA- neg, Hepatitis C ab-neg    # Sacral decubitus ulcer: stage II without signs of infection   # Type 2 diabetes:  A1c 8.1% and blood sugars elevated above goal -likely contributing to poor wound healing   # SHENG     Recommendations:  - Continue give albumin 100 q6hr today to help with LE edema   - Follow up speciation of GPCC in bone tissue cx.   - IV antibiotics as per Transplant ID  - Bactrim SS PO daily and valganciclovir 450mg daily for ppx as per Transplant Surgery  - continue everolimus dosing by trough (currently 2mg q12); trend trough level daily, goal 3 ng/mL  - continue to hold MMF for active infection  - continue prednisone 5mg qd  - continue Lasix 20mg IV daily (equivalent to home Lasix 40 PO)  - daily CMP and INR for MELD-Na  - Transplant Hep to follow      Thank you for involving us in the care of this patient, please reach out if any further questions.     Ky Ram MD  Gastroenterology Fellow, PGY4    Available on [employer] Teams  [phone] ([facility])  [phone] ([facility])  Please contact on call fellow weekdays after 5pm-7am and weekends: [phone]

## 2020-11-29 NOTE — PROGRESS NOTE ADULT - SUBJECTIVE AND OBJECTIVE BOX
--------------------------------------------------------------------------------  Chief Complaint: No new symmptoms    24 hour events/subjective:    reviewed    PAST HISTORY  --------------------------------------------------------------------------------  No significant changes to PMH, PSH, FHx, SHx, unless otherwise noted    ALLERGIES & MEDICATIONS  --------------------------------------------------------------------------------  Allergies    codeine (Anaphylaxis)    Intolerances      Standing Inpatient Medications  albumin human 25% IVPB 100 milliLiter(s) IV Intermittent every 8 hours  aspirin enteric coated 81 milliGRAM(s) Oral daily  cefepime   IVPB 2000 milliGRAM(s) IV Intermittent every 12 hours  collagenase Ointment 1 Application(s) Topical daily  dextrose 40% Gel 15 Gram(s) Oral once  dextrose 5%. 1000 milliLiter(s) IV Continuous <Continuous>  dextrose 5%. 1000 milliLiter(s) IV Continuous <Continuous>  dextrose 5%. 1000 milliLiter(s) IV Continuous <Continuous>  dextrose 50% Injectable 25 Gram(s) IV Push once  dextrose 50% Injectable 12.5 Gram(s) IV Push once  dextrose 50% Injectable 25 Gram(s) IV Push once  everolimus (ZORTRESS) 2 milliGRAM(s) Oral <User Schedule>  furosemide   Injectable 20 milliGRAM(s) IV Push daily  glucagon  Injectable 1 milliGRAM(s) IntraMuscular once  insulin glargine Injectable (LANTUS) 8 Unit(s) SubCutaneous at bedtime  insulin lispro (ADMELOG) corrective regimen sliding scale   SubCutaneous Before meals and at bedtime  insulin lispro Injectable (ADMELOG) 10 Unit(s) SubCutaneous three times a day before meals  magnesium oxide 800 milliGRAM(s) Oral with breakfast  pantoprazole    Tablet 40 milliGRAM(s) Oral before breakfast  predniSONE   Tablet 5 milliGRAM(s) Oral daily  tamsulosin 0.4 milliGRAM(s) Oral at bedtime  trimethoprim   80 mG/sulfamethoxazole 400 mG 1 Tablet(s) Oral daily  valGANciclovir 450 milliGRAM(s) Oral daily    PRN Inpatient Medications  acetaminophen   Tablet .. 650 milliGRAM(s) Oral every 6 hours PRN      REVIEW OF SYSTEMS  --------------------------------------------------------------------------------  Right foot- heel wound. Denies any pain    VITALS/PHYSICAL EXAM  --------------------------------------------------------------------------------  T(C): 36.8 (11-29-20 @ 04:49), Max: 37.2 (11-28-20 @ 22:19)  HR: 73 (11-29-20 @ 04:49) (73 - 84)  BP: 127/67 (11-29-20 @ 04:49) (119/65 - 135/64)  RR: 18 (11-29-20 @ 04:49) (18 - 18)  SpO2: 99% (11-29-20 @ 04:49) (98% - 99%)          11-28-20 @ 07:01  -  11-29-20 @ 07:00  --------------------------------------------------------  IN: 0 mL / OUT: 3300 mL / NET: -3300 mL    11-29-20 @ 07:01  -  11-29-20 @ 10:54  --------------------------------------------------------  IN: 240 mL / OUT: 400 mL / NET: -160 mL      Physical Exam:  	Gen: NAD, well-appearing  	HEENT: PERRL, supple neck, clear oropharynx  	Pulm: CTA B/L  	CV: RRR, S1S2; no rub  	Abd: +BS, soft, nontender/nondistended             	: No suprapubic tenderness  	LE: Warm, FROM, intact strength; Trace edema  	Neuro: No asterixis  	Psych: Normal affect and mood  	Skin: Warm, without rashes      LABS/STUDIES  --------------------------------------------------------------------------------              8.3    3.04  >-----------<  163      [11-29-20 @ 06:22]              26.2     139  |  105  |  40  ----------------------------<  158      [11-29-20 @ 06:21]  4.5   |  25  |  1.41        Ca     9.0     [11-29-20 @ 06:21]      Mg     2.2     [11-29-20 @ 06:21]      Phos  2.8     [11-29-20 @ 06:21]    TPro  5.9  /  Alb  3.7  /  TBili  0.2  /  DBili  x   /  AST  21  /  ALT  28  /  AlkPhos  95  [11-29-20 @ 06:21]          Creatinine Trend:  SCr 1.41 [11-29 @ 06:21]  SCr 1.29 [11-28 @ 06:06]  SCr 1.56 [11-27 @ 06:09]  SCr 1.31 [11-26 @ 06:02]  SCr 1.36 [11-25 @ 06:12]              Urinalysis - [11-27-20 @ 14:30]      Color Light Yellow / Appearance Clear / SG 1.020 / pH 6.5      Gluc Trace / Ketone Negative  / Bili Negative / Urobili Negative       Blood Small / Protein 30 mg/dL / Leuk Est Moderate / Nitrite Negative      RBC 14 / WBC 31 / Hyaline 5 / Gran  / Sq Epi  / Non Sq Epi 2 / Bacteria Negative    Urine Creatinine 108      [11-27-20 @ 14:30]  Urine Protein 43      [11-27-20 @ 17:13]  Urine Sodium 94      [11-27-20 @ 14:30]  Urine Urea Nitrogen 713      [11-27-20 @ 17:13]  Urine Potassium 54      [11-27-20 @ 14:30]  Urine Osmolality 561      [11-27-20 @ 14:30]    Iron 160, TIBC Unable to calculate Test Repeated, %sat Unable to calculate Test Repeated      [06-12-20 @ 09:04]  Ferritin 493      [04-29-20 @ 08:20]  PTH -- (Ca 10.3)      [12-13-19 @ 08:11]   12  Vitamin D (25OH) 25.3      [12-13-19 @ 08:11]  HbA1c 6.4      [02-12-20 @ 17:08]  TSH 3.26      [04-26-20 @ 09:47]  Lipid: chol 158, , HDL 44, LDL 93      [08-05-20 @ 08:57]

## 2020-11-30 ENCOUNTER — APPOINTMENT (OUTPATIENT)
Dept: HEPATOLOGY | Facility: CLINIC | Age: 65
End: 2020-11-30

## 2020-11-30 LAB
ALBUMIN SERPL ELPH-MCNC: 4 G/DL — SIGNIFICANT CHANGE UP (ref 3.3–5)
ALP SERPL-CCNC: 110 U/L — SIGNIFICANT CHANGE UP (ref 40–120)
ALT FLD-CCNC: 29 U/L — SIGNIFICANT CHANGE UP (ref 10–45)
ANION GAP SERPL CALC-SCNC: 10 MMOL/L — SIGNIFICANT CHANGE UP (ref 5–17)
AST SERPL-CCNC: 21 U/L — SIGNIFICANT CHANGE UP (ref 10–40)
BILIRUB SERPL-MCNC: 0.2 MG/DL — SIGNIFICANT CHANGE UP (ref 0.2–1.2)
BUN SERPL-MCNC: 40 MG/DL — HIGH (ref 7–23)
CALCIUM SERPL-MCNC: 9.4 MG/DL — SIGNIFICANT CHANGE UP (ref 8.4–10.5)
CHLORIDE SERPL-SCNC: 105 MMOL/L — SIGNIFICANT CHANGE UP (ref 96–108)
CO2 SERPL-SCNC: 26 MMOL/L — SIGNIFICANT CHANGE UP (ref 22–31)
CREAT SERPL-MCNC: 1.67 MG/DL — HIGH (ref 0.5–1.3)
GLUCOSE SERPL-MCNC: 181 MG/DL — HIGH (ref 70–99)
HCT VFR BLD CALC: 28.9 % — LOW (ref 39–50)
HGB BLD-MCNC: 9.1 G/DL — LOW (ref 13–17)
MAGNESIUM SERPL-MCNC: 2.2 MG/DL — SIGNIFICANT CHANGE UP (ref 1.6–2.6)
MCHC RBC-ENTMCNC: 28.8 PG — SIGNIFICANT CHANGE UP (ref 27–34)
MCHC RBC-ENTMCNC: 31.5 GM/DL — LOW (ref 32–36)
MCV RBC AUTO: 91.5 FL — SIGNIFICANT CHANGE UP (ref 80–100)
NRBC # BLD: 0 /100 WBCS — SIGNIFICANT CHANGE UP (ref 0–0)
PHOSPHATE SERPL-MCNC: 2.9 MG/DL — SIGNIFICANT CHANGE UP (ref 2.5–4.5)
PLATELET # BLD AUTO: 167 K/UL — SIGNIFICANT CHANGE UP (ref 150–400)
POTASSIUM SERPL-MCNC: 4.8 MMOL/L — SIGNIFICANT CHANGE UP (ref 3.5–5.3)
POTASSIUM SERPL-SCNC: 4.8 MMOL/L — SIGNIFICANT CHANGE UP (ref 3.5–5.3)
PROT SERPL-MCNC: 6.3 G/DL — SIGNIFICANT CHANGE UP (ref 6–8.3)
RBC # BLD: 3.16 M/UL — LOW (ref 4.2–5.8)
RBC # FLD: 13.2 % — SIGNIFICANT CHANGE UP (ref 10.3–14.5)
SODIUM SERPL-SCNC: 141 MMOL/L — SIGNIFICANT CHANGE UP (ref 135–145)
WBC # BLD: 3.05 K/UL — LOW (ref 3.8–10.5)
WBC # FLD AUTO: 3.05 K/UL — LOW (ref 3.8–10.5)

## 2020-11-30 PROCEDURE — 99232 SBSQ HOSP IP/OBS MODERATE 35: CPT | Mod: GC

## 2020-11-30 PROCEDURE — 99232 SBSQ HOSP IP/OBS MODERATE 35: CPT

## 2020-11-30 PROCEDURE — 99232 SBSQ HOSP IP/OBS MODERATE 35: CPT | Mod: GC,24

## 2020-11-30 RX ADMIN — Medication 10 UNIT(S): at 08:44

## 2020-11-30 RX ADMIN — Medication 20 MILLIGRAM(S): at 06:29

## 2020-11-30 RX ADMIN — Medication 4: at 12:41

## 2020-11-30 RX ADMIN — TAMSULOSIN HYDROCHLORIDE 0.4 MILLIGRAM(S): 0.4 CAPSULE ORAL at 22:05

## 2020-11-30 RX ADMIN — Medication 1 APPLICATION(S): at 19:59

## 2020-11-30 RX ADMIN — EVEROLIMUS 2 MILLIGRAM(S): 10 TABLET ORAL at 20:00

## 2020-11-30 RX ADMIN — Medication 10 UNIT(S): at 19:25

## 2020-11-30 RX ADMIN — EVEROLIMUS 2 MILLIGRAM(S): 10 TABLET ORAL at 08:44

## 2020-11-30 RX ADMIN — Medication 81 MILLIGRAM(S): at 11:42

## 2020-11-30 RX ADMIN — Medication 4: at 19:25

## 2020-11-30 RX ADMIN — CEFEPIME 100 MILLIGRAM(S): 1 INJECTION, POWDER, FOR SOLUTION INTRAMUSCULAR; INTRAVENOUS at 06:29

## 2020-11-30 RX ADMIN — Medication 10 UNIT(S): at 12:41

## 2020-11-30 RX ADMIN — VALGANCICLOVIR 450 MILLIGRAM(S): 450 TABLET, FILM COATED ORAL at 11:42

## 2020-11-30 RX ADMIN — Medication 1 TABLET(S): at 11:42

## 2020-11-30 RX ADMIN — CEFEPIME 100 MILLIGRAM(S): 1 INJECTION, POWDER, FOR SOLUTION INTRAMUSCULAR; INTRAVENOUS at 17:52

## 2020-11-30 RX ADMIN — PANTOPRAZOLE SODIUM 40 MILLIGRAM(S): 20 TABLET, DELAYED RELEASE ORAL at 06:29

## 2020-11-30 RX ADMIN — Medication 5 MILLIGRAM(S): at 06:29

## 2020-11-30 RX ADMIN — Medication 4: at 08:44

## 2020-11-30 RX ADMIN — INSULIN GLARGINE 8 UNIT(S): 100 INJECTION, SOLUTION SUBCUTANEOUS at 22:04

## 2020-11-30 RX ADMIN — MAGNESIUM OXIDE 400 MG ORAL TABLET 800 MILLIGRAM(S): 241.3 TABLET ORAL at 08:44

## 2020-11-30 NOTE — PROGRESS NOTE ADULT - SUBJECTIVE AND OBJECTIVE BOX
Follow Up:  foot wound    Interval History: afebrile. no significant foot pain    REVIEW OF SYSTEMS  [  ] ROS unobtainable because:    [x  ] All other systems negative except as noted below    Constitutional:  [ ] fever [ ] chills  [ ] weight loss  [ ] weakness  Skin:  [ ] rash [ ] phlebitis	  Eyes: [ ] icterus [ ] pain  [ ] discharge	  ENMT: [ ] sore throat  [ ] thrush [ ] ulcers [ ] exudates  Respiratory: [ ] dyspnea [ ] hemoptysis [ ] cough [ ] sputum	  Cardiovascular:  [ ] chest pain [ ] palpitations [ ] edema	  Gastrointestinal:  [ ] nausea [ ] vomiting [ ] diarrhea [ ] constipation [ ] pain	  Genitourinary:  [ ] dysuria [ ] frequency [ ] hematuria [ ] discharge [ ] flank pain  [ ] incontinence  Musculoskeletal:  [ ] myalgias [ ] arthralgias [ ] arthritis  [ ] back pain  Neurological:  [ ] headache [ ] seizures  [ ] confusion/altered mental status    Allergies  codeine (Anaphylaxis)        ANTIMICROBIALS:  cefepime   IVPB 2000 every 12 hours  trimethoprim   80 mG/sulfamethoxazole 400 mG 1 daily  valGANciclovir 450 daily      OTHER MEDS:  MEDICATIONS  (STANDING):  acetaminophen   Tablet .. 650 every 6 hours PRN  aspirin enteric coated 81 daily  dextrose 40% Gel 15 once  dextrose 50% Injectable 25 once  dextrose 50% Injectable 12.5 once  dextrose 50% Injectable 25 once  everolimus (ZORTRESS) 2 <User Schedule>  furosemide    Tablet 20 daily  glucagon  Injectable 1 once  insulin glargine Injectable (LANTUS) 8 at bedtime  insulin lispro (ADMELOG) corrective regimen sliding scale  three times a day before meals  insulin lispro (ADMELOG) corrective regimen sliding scale  at bedtime  insulin lispro Injectable (ADMELOG) 10 three times a day before meals  pantoprazole    Tablet 40 before breakfast  predniSONE   Tablet 5 daily  tamsulosin 0.4 at bedtime      Vital Signs Last 24 Hrs  T(C): 36.7 (30 Nov 2020 17:00), Max: 37.1 (29 Nov 2020 21:00)  T(F): 98.1 (30 Nov 2020 17:00), Max: 98.7 (29 Nov 2020 21:00)  HR: 82 (30 Nov 2020 17:00) (73 - 87)  BP: 144/78 (30 Nov 2020 17:00) (116/58 - 151/72)  BP(mean): --  RR: 18 (30 Nov 2020 17:00) (18 - 20)  SpO2: 98% (30 Nov 2020 17:00) (98% - 99%)    PHYSICAL EXAMINATION:  General: Alert and Awake, NAD  HEENT: PERRL, EOMI  Neck: Supple  Cardiac: RRR, No M/R/G  Resp: CTAB, No Wh/Rh/Ra  Abdomen: NBS, NT/ND, No HSM, No rigidity or guarding  MSK: +dressing over right foot. No LE edema. No Calf tenderness  : No bob  Skin: No rashes or lesions. Skin is warm and dry to the touch.   Neuro: Alert and Awake. CN 2-12 Grossly intact. Moves all four extremities spontaneously.  Psych: Calm, Pleasant, Cooperative                          9.1    3.05  )-----------( 167      ( 30 Nov 2020 06:38 )             28.9       11-30    141  |  105  |  40<H>  ----------------------------<  181<H>  4.8   |  26  |  1.67<H>    Ca    9.4      30 Nov 2020 06:38  Phos  2.9     11-30  Mg     2.2     11-30    TPro  6.3  /  Alb  4.0  /  TBili  0.2  /  DBili  x   /  AST  21  /  ALT  29  /  AlkPhos  110  11-30          MICROBIOLOGY:  v  .Tissue Other  11-25-20   Growth in fluid media only Staphylococcus epidermidis  "Susceptibilities not performed"  --  Staphylococcus epidermidis      Skin wound  11-21-20   Few Pseudomonas aeruginosa  Normal skin ninfa isolated  --  Pseudomonas aeruginosa      .Blood Blood-Peripheral  11-20-20   No Growth Final  --  --        CMV IgG Antibody: <0.20 U/mL (12-04-19 @ 08:33)  Toxoplasma IgG Screen: <3.0 IU/mL (12-04-19 @ 08:33)          RADIOLOGY:    <The imaging below has been reviewed and visualized by me independently. Findings as detailed in report below>    EXAM:  US DPLX KIDNEY(IES)                        PROCEDURE DATE:  11/27/2020    No hydronephrosis.  No evidence of a hemodynamically significant left renal artery stenosis.  Nonvisualization of the right renal artery origin. No distal tardus parvus waveforms to suggest a hemodynamically significant stenosis.  Patent renal veins.    EXAM:  NM INFLAMM LOC WBC WB SD                        EXAM:  NM MULTI DAY PROCEDURE                        EXAM:  NM BONE MARROW IMG MULT AREAS                        EXAM:  NM INFLAMM LOC SPECT CT SA SD                        PROCEDURE DATE:  11/24/2020    Combined Indium-111 labeled leukocyte study and marrow scan demonstrates:  No scan evidence of osteomyelitis in the right heel.  Findings raise the possibility of neuropathic joint in the right midfoot.

## 2020-11-30 NOTE — PROGRESS NOTE ADULT - ASSESSMENT
64M with IDDM, HLD, obesity, HFpEF with mild LV diastolic dysfunction, MGUS, chronic anemia with a history of duodenal ulcer 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 Known history of non healing R heel ulcer. Followed by podiatry outpatient Dr. Patel, had recent debridement wound (10/20) cx grew ec faecalis, pseudomonas, enterococcus. Tx with Cipro without improvement. Admitted with non healing R foot ulcer    [] R heel ulcer  - MRI c/w early OM, Tagged WBC scan negative.  - S/p OR for right heal debridement 11/24  - Wound culture on admission grew pseudomonas (resistant to cipro), wound cx from OR grew Staph epi; Bone biopsy Path pending   - Podiatry following  - ID following: cont cefepime pending bone biopsy results  - DISPO plan pending antibiotic regimen    [] MISA  - SCr 1.6 from 1.4; good u/o   - Continue PO Lasix  - Strict I/Os    [] s/p OLT with good graft function   - Immuno: Everolimus 2/2, MMF on hold in setting of infection, Pred 5  - PPx: bactrim/valcyte (currently on 450mg QD decreased from 900 QD 2/2 low WBC)  - Daily CBC, BMP  - Lasix 20mg PO daily     [] DM  - Glucose controlled  - Fingersticks ac and qhs  - Continue glargine 8u qhs and  lispro 10u with meals  - SSI coverage    [] Dispo   - dc planning pending bone biopsy results and final ID rec's

## 2020-11-30 NOTE — PROGRESS NOTE ADULT - ASSESSMENT
64 year old male with past medical history of cirrhosis s/p OLT (7/2/20 at [facility]), IDDM, MGUS who presented to [facility] on 11/20 with worsening drainage at his right heel with concern for osteomyelitis.    Superficial Cultures here with pseudomonas   MRSA Nasal PCR negative  s/p right foot heel incision and drainage w/ application of stravix and bone biopsy on 11/24  Per Podiatry low concern for OM    Wound cultures thus far with Staph epi but only growing in liquid broth (likely contaminant)  Would continue Cefepime for now (today is day 10 so after today would complete treatment course for SSTI)  Pathology will be only way to definitively confirm if there is underlying OM    Continue Bactrim SS PO QD for PCP PPx  Continue Valcyte for CMV PPx (D+/R-)    Overall, Foot Wound, Positive Culture Finding, Liver Transplant Recipient, Prophylactic Antibiotic    I will continue to follow. Please feel free to contact me with any further questions.    Eusebio Pérez M.D.  [facility] Division of Infectious Disease  8AM-5PM: Pager Number [phone]  After Hours (or if no response): Please contact the Infectious Diseases Office at [phone]     The above assessment and plan were discussed with Transplant Surgery Team

## 2020-11-30 NOTE — PROGRESS NOTE ADULT - ATTENDING COMMENTS
no complaints  Cr 1.67 today     no leg pain/swelling    Plan  continued IVAbx for heel ulcer  advised to drink more fluids  await bone biopsy results  ID plan if biopsy does not show infection vs if shows infection (duration, type and administration of antibiotics)

## 2020-11-30 NOTE — PROGRESS NOTE ADULT - SUBJECTIVE AND OBJECTIVE BOX
Chief Complaint:  Patient is a 64y old  Male who presents with a chief complaint of R heel wound (2020 10:24)      Interval Events:     No right foot pain, leg swelling resolved with IV albumin, no acute complaints     Allergies:  codeine (Anaphylaxis)      Hospital Medications:  acetaminophen   Tablet .. 650 milliGRAM(s) Oral every 6 hours PRN  aspirin enteric coated 81 milliGRAM(s) Oral daily  cefepime   IVPB 2000 milliGRAM(s) IV Intermittent every 12 hours  collagenase Ointment 1 Application(s) Topical daily  dextrose 40% Gel 15 Gram(s) Oral once  dextrose 5%. 1000 milliLiter(s) IV Continuous <Continuous>  dextrose 5%. 1000 milliLiter(s) IV Continuous <Continuous>  dextrose 5%. 1000 milliLiter(s) IV Continuous <Continuous>  dextrose 50% Injectable 25 Gram(s) IV Push once  dextrose 50% Injectable 12.5 Gram(s) IV Push once  dextrose 50% Injectable 25 Gram(s) IV Push once  everolimus (ZORTRESS) 2 milliGRAM(s) Oral <User Schedule>  furosemide    Tablet 20 milliGRAM(s) Oral daily  glucagon  Injectable 1 milliGRAM(s) IntraMuscular once  insulin glargine Injectable (LANTUS) 8 Unit(s) SubCutaneous at bedtime  insulin lispro (ADMELOG) corrective regimen sliding scale   SubCutaneous three times a day before meals  insulin lispro (ADMELOG) corrective regimen sliding scale   SubCutaneous at bedtime  insulin lispro Injectable (ADMELOG) 10 Unit(s) SubCutaneous three times a day before meals  magnesium oxide 800 milliGRAM(s) Oral with breakfast  pantoprazole    Tablet 40 milliGRAM(s) Oral before breakfast  predniSONE   Tablet 5 milliGRAM(s) Oral daily  tamsulosin 0.4 milliGRAM(s) Oral at bedtime  trimethoprim   80 mG/sulfamethoxazole 400 mG 1 Tablet(s) Oral daily  valGANciclovir 450 milliGRAM(s) Oral daily      PMHX/PSHX:  GIB (gastrointestinal bleeding)    GERD with esophagitis    Hepatic encephalopathy    Obesity    Fatty liver disease, nonalcoholic    Renal stones    Hypertension    Neuropathy    Hypercholesteremia    Diabetes    S/P cholecystectomy    No significant past surgical history        Family history:  Family history of type 2 diabetes mellitus    Family history of hypertension    Family history of stomach cancer    No pertinent family history in first degree relatives        ROS:     General:  No weight loss, fevers, chills, night sweats, fatigue   Eyes:  No vision changes  ENT:  No sore throat, pain, runny nose  CV:  No chest pain, palpitations, dizziness   Resp:  No SOB, cough, wheezing  GI:  See HPI  :  No burning with urination, hematuria  Muscle:  No pain, weakness  Neuro:  No weakness/tingling, memory problems  Psych:  No fatigue, insomnia, mood problems, depression  Heme:  No easy bruisability  Skin:  No rash, edema      PHYSICAL EXAM:     GENERAL:  No distress, resting comfortably in bed  HEENT:  Conjunctivae clear, sclera anicteric  CHEST:  No increased effort w/ respirations  HEART:  Regular rhythm & rate  ABDOMEN:  Soft, non-tender, non-distended  EXTREMITIES: No LE edema +R. foot covered in ACE wrap  SKIN:  No rash/erythema/ecchymoses/petechiae/jaundice   NEURO:  Alert, orientedx3      Vital Signs:  Vital Signs Last 24 Hrs  T(C): 36.7 (2020 07:49), Max: 37.1 (2020 21:00)  T(F): 98 (2020 07:49), Max: 98.7 (2020 21:00)  HR: 74 (2020 07:49) (73 - 87)  BP: 151/72 (2020 07:49) (116/58 - 152/76)  BP(mean): --  RR: 20 (2020 07:49) (18 - 20)  SpO2: 99% (2020 07:49) (98% - 99%)  Daily     Daily Weight in k.1 (2020 05:00)    LABS:                        9.1    3.05  )-----------( 167      ( 2020 06:38 )             28.9         141  |  105  |  40<H>  ----------------------------<  181<H>  4.8   |  26  |  1.67<H>    Ca    9.4      2020 06:38  Phos  2.9       Mg     2.2         TPro  6.3  /  Alb  4.0  /  TBili  0.2  /  DBili  x   /  AST  21  /  ALT  29  /  AlkPhos  110      LIVER FUNCTIONS - ( 2020 06:38 )  Alb: 4.0 g/dL / Pro: 6.3 g/dL / ALK PHOS: 110 U/L / ALT: 29 U/L / AST: 21 U/L / GGT: x             Imaging:

## 2020-11-30 NOTE — PROGRESS NOTE ADULT - SUBJECTIVE AND OBJECTIVE BOX
Transplant Surgery Multidisciplinary Progress Note    OLT 7/2/2020    Present:   Patient seen and examined with multidisciplinary team including Transplant Surgeon: Dr. Ackerman, Transplant Nephrologist: Dr. YNES Patel, Transplant Pharmacist, ZBIGNIEW Savage and unit RN during am rounds.  Disciplines not in attendance will be notified of the plan.     HPI: 64M with IDDM, HLD, obesity, HFpEF with mild LV diastolic dysfunction, MGUS, chronic anemia with a history of duodenal ulcer 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  Known history of non healing R heel ulcer. Followed by podiatry outpatient Dr. Patel, had recent debridement wound (10/20) cx grew ec faecalis, pseudomonas, enterococcus. Tx with cipro.  No improvement post debridement and PO antibiotic treatment.    Admitted with non healing R heel ulcer.   ·	R wound cx on admission grew Pseudomonas resistant to cipro; started on meropenem 11/20-11/23, vanco 11/23-11/25, Cefepime 11/23  ·	MRI c/w early OM. Podiatry and ID consulted. Broadened to meropenem. MMF held on admission, Everolimus reduced  ·	WBC scan -neg for OM  ·	OR (11/24)-> wound debridement (cxs grew staph epi), bone bx (path pending)   ·	MISA: renal us no hydro; patent vessels  ·	Neutropenia: valcyte dose reduced 450mg daily (was on 900mg daily CMV +/-), MMF held in setting of infection     Interval Events:   - afebrile, stable VSS, on cefepime  - POD 6 s/p R heal wound debridement (wound cx grew staph epi); Bone biopsy path pending   - Lasix change from IV to PO  - SCr 1.6 from 1.4;   UOP 3.9L    Potential Discharge date: pending clinical improvement     Education:  Medications    Plan of care:  See Below      MEDICATIONS  (STANDING):  aspirin enteric coated 81 milliGRAM(s) Oral daily  cefepime   IVPB 2000 milliGRAM(s) IV Intermittent every 12 hours  collagenase Ointment 1 Application(s) Topical daily  dextrose 40% Gel 15 Gram(s) Oral once  dextrose 5%. 1000 milliLiter(s) (50 mL/Hr) IV Continuous <Continuous>  dextrose 5%. 1000 milliLiter(s) (100 mL/Hr) IV Continuous <Continuous>  dextrose 5%. 1000 milliLiter(s) (100 mL/Hr) IV Continuous <Continuous>  dextrose 50% Injectable 25 Gram(s) IV Push once  dextrose 50% Injectable 12.5 Gram(s) IV Push once  dextrose 50% Injectable 25 Gram(s) IV Push once  everolimus (ZORTRESS) 2 milliGRAM(s) Oral <User Schedule>  furosemide    Tablet 20 milliGRAM(s) Oral daily  glucagon  Injectable 1 milliGRAM(s) IntraMuscular once  insulin glargine Injectable (LANTUS) 8 Unit(s) SubCutaneous at bedtime  insulin lispro (ADMELOG) corrective regimen sliding scale   SubCutaneous three times a day before meals  insulin lispro (ADMELOG) corrective regimen sliding scale   SubCutaneous at bedtime  insulin lispro Injectable (ADMELOG) 10 Unit(s) SubCutaneous three times a day before meals  magnesium oxide 800 milliGRAM(s) Oral with breakfast  pantoprazole    Tablet 40 milliGRAM(s) Oral before breakfast  predniSONE   Tablet 5 milliGRAM(s) Oral daily  tamsulosin 0.4 milliGRAM(s) Oral at bedtime  trimethoprim   80 mG/sulfamethoxazole 400 mG 1 Tablet(s) Oral daily  valGANciclovir 450 milliGRAM(s) Oral daily    MEDICATIONS  (PRN):  acetaminophen   Tablet .. 650 milliGRAM(s) Oral every 6 hours PRN Moderate Pain (4 - 6)      PAST MEDICAL & SURGICAL HISTORY:  GIB (gastrointestinal bleeding)    GERD with esophagitis  Gastritis &amp; Non Bleeding Ulcers    Hepatic encephalopathy    Obesity    Fatty liver disease, nonalcoholic    Renal stones  25 years ago    Hypertension    Neuropathy    Hypercholesteremia    Diabetes    S/P cholecystectomy        Vital Signs Last 24 Hrs  T(C): 36.7 (30 Nov 2020 07:49), Max: 37.1 (29 Nov 2020 21:00)  T(F): 98 (30 Nov 2020 07:49), Max: 98.7 (29 Nov 2020 21:00)  HR: 74 (30 Nov 2020 07:49) (73 - 87)  BP: 151/72 (30 Nov 2020 07:49) (116/58 - 152/76)  BP(mean): --  RR: 20 (30 Nov 2020 07:49) (18 - 20)  SpO2: 99% (30 Nov 2020 07:49) (98% - 99%)    I&O's Summary    29 Nov 2020 07:01  -  30 Nov 2020 07:00  --------------------------------------------------------  IN: 1785 mL / OUT: 3900 mL / NET: -2115 mL                              9.1    3.05  )-----------( 167      ( 30 Nov 2020 06:38 )             28.9     11-30    141  |  105  |  40<H>  ----------------------------<  181<H>  4.8   |  26  |  1.67<H>    Ca    9.4      30 Nov 2020 06:38  Phos  2.9     11-30  Mg     2.2     11-30    TPro  6.3  /  Alb  4.0  /  TBili  0.2  /  DBili  x   /  AST  21  /  ALT  29  /  AlkPhos  110  11-30      Culture - Tissue with Gram Stain (collected 11-25-20 @ 00:55)  Source: .Tissue Other  Gram Stain (11-25-20 @ 04:24):    No polymorphonuclear leukocytes seen per low power field    No organisms seen per oil power field  Final Report (11-29-20 @ 21:12):    Growth in fluid media only Staphylococcus epidermidis    "Susceptibilities not performed"  Organism: Staphylococcus epidermidis (11-29-20 @ 21:12)  Organism: Staphylococcus epidermidis (11-29-20 @ 21:12)        Review of systems  Gen: No weight changes, fatigue, fevers/chills, weakness  Skin: R heel wound  Head/Eyes/Ears/Mouth: No headache; Normal hearing; Normal vision w/o blurriness; No sinus pain/discomfort, sore throat  Respiratory: No dyspnea, cough, wheezing, hemoptysis  CV: No chest pain, PND, orthopnea  GI: no abdominal pain  : No increased frequency, dysuria, hematuria, nocturia  MSK: No joint pain/swelling; no back pain; + LE edema  Neuro: No dizziness/lightheadedness, weakness, seizures, numbness, tingling  Heme: No easy bruising or bleeding  Endo: No heat/cold intolerance  Psych: No significant nervousness, anxiety, stress, depression  All other systems were reviewed and are negative, except as noted.    Physical Exam:   GENERAL:  No acute distress  HEENT:  Normocephalic/atraumatic, no scleral icterus  CHEST:  Clear to auscultation bilaterally, no wheezes/rales/ronchi, no accessory muscle use  HEART:  Regular rate and rhythm, no murmurs/rubs/gallops  ABDOMEN:  Soft, non-tender, non-distended,  well healed incision  EXTREMITIES: RLE dressing intact, no drainage noted. +1 BLE edema.  SKIN:  No rash/erythema/ecchymoses/petechiae/wounds/abscess/warm/dry  NEURO:  Alert and oriented x 3, no asterixis

## 2020-11-30 NOTE — PROGRESS NOTE ADULT - SUBJECTIVE AND OBJECTIVE BOX
INTERVAL HPI/OVERNIGHT EVENTS:    patient seen and examined3  no acute overnight events       MEDICATIONS  (STANDING):  aspirin enteric coated 81 milliGRAM(s) Oral daily  cefepime   IVPB 2000 milliGRAM(s) IV Intermittent every 12 hours  collagenase Ointment 1 Application(s) Topical daily  Dakins Solution - 1/2 Strength 1 Application(s) Topical daily  dextrose 40% Gel 15 Gram(s) Oral once  dextrose 5%. 1000 milliLiter(s) (50 mL/Hr) IV Continuous <Continuous>  dextrose 5%. 1000 milliLiter(s) (100 mL/Hr) IV Continuous <Continuous>  dextrose 5%. 1000 milliLiter(s) (100 mL/Hr) IV Continuous <Continuous>  dextrose 50% Injectable 25 Gram(s) IV Push once  dextrose 50% Injectable 12.5 Gram(s) IV Push once  dextrose 50% Injectable 25 Gram(s) IV Push once  everolimus (ZORTRESS) 2 milliGRAM(s) Oral <User Schedule>  furosemide   Injectable 20 milliGRAM(s) IV Push daily  glucagon  Injectable 1 milliGRAM(s) IntraMuscular once  insulin glargine Injectable (LANTUS) 8 Unit(s) SubCutaneous at bedtime  insulin lispro (ADMELOG) corrective regimen sliding scale   SubCutaneous every 6 hours  insulin lispro Injectable (ADMELOG) 10 Unit(s) SubCutaneous three times a day before meals  magnesium oxide 800 milliGRAM(s) Oral with breakfast  pantoprazole    Tablet 40 milliGRAM(s) Oral before breakfast  predniSONE   Tablet 5 milliGRAM(s) Oral daily  tamsulosin 0.4 milliGRAM(s) Oral at bedtime  trimethoprim   80 mG/sulfamethoxazole 400 mG 1 Tablet(s) Oral daily  valGANciclovir 450 milliGRAM(s) Oral daily  vancomycin  IVPB 1250 milliGRAM(s) IV Intermittent daily    MEDICATIONS  (PRN):  acetaminophen   Tablet .. 650 milliGRAM(s) Oral every 6 hours PRN Temp greater or equal to 38C (100.4F), Mild Pain (1 - 3)      Allergies    codeine (Anaphylaxis)    Intolerances        Review of Systems:    General:  No wt loss, fevers, chills, night sweats,fatigue,   Eyes:  Good vision, no reported pain  ENT:  No sore throat, pain, runny nose, dysphagia  CV:  No pain, palpitatioins, hypo/hypertension  Resp:  No dyspnea, cough, tachypnea, wheezing  GI:  No pain, No nausea, No vomiting, No diarrhea, No constipatiion, No weight loss, No fever, No pruritis, No rectal bleeding, No tarry stools, No dysphagia,  :  No pain, bleeding, incontinence, nocturia  Muscle:  No pain, weakness  Neuro:  No weakness, tingling, memory problems  Psych:  No fatigue, insomnia, mood problems, depression  Endocrine:  No polyuria, polydypsia, cold/heat intolerance  Heme:  No petechiae, ecchymosis, easy bruisability  Skin:  No rash, tattoos, scars, edema      Vital Signs Last 24 Hrs  T(C): 37 (24 Nov 2020 06:31), Max: 37 (24 Nov 2020 01:00)  T(F): 98.6 (24 Nov 2020 06:31), Max: 98.6 (24 Nov 2020 01:00)  HR: 75 (24 Nov 2020 06:31) (75 - 87)  BP: 119/63 (24 Nov 2020 06:31) (119/63 - 150/68)  BP(mean): --  RR: 18 (24 Nov 2020 06:31) (18 - 18)  SpO2: 98% (24 Nov 2020 06:31) (98% - 100%)    PHYSICAL EXAM:    Constitutional: NAD  HEENT: EOMI, throat clear  Neck: No LAD, supple  Gastrointestinal: BS+, obese, soft, NT/ND  Extremities: + edema, R foot wrap   Neurological: A/O x 3, no focal deficits        LABS:                        8.9    2.16  )-----------( 160      ( 24 Nov 2020 06:15 )             27.7     11-23    140  |  106  |  25<H>  ----------------------------<  145<H>  4.4   |  25  |  1.44<H>    Ca    8.9      23 Nov 2020 05:58  Phos  3.2     11-24  Mg     2.0     11-24    TPro  5.8<L>  /  Alb  3.2<L>  /  TBili  0.1<L>  /  DBili  x   /  AST  15  /  ALT  15  /  AlkPhos  90  11-23    PT/INR - ( 24 Nov 2020 06:15 )   PT: 11.6 sec;   INR: 0.97 ratio         PTT - ( 24 Nov 2020 06:15 )  PTT:30.1 sec      RADIOLOGY & ADDITIONAL TESTS:

## 2020-11-30 NOTE — PROGRESS NOTE ADULT - ATTENDING COMMENTS
See me 1-2 weeks after the surgery      ENT  [phone]    SURGERY DATE: 4/25/2019 7:30  [id number]   Surgical pathology resulted today from 11/24 and shows right calcaneus OM and right heel gangrenous necrosis. Will follow-up with Podiatry re: whether additional surgical intervention is required. He will need a prolonged course of IV antibiotics. Discussed with Transplant Surgery.

## 2020-11-30 NOTE — PROGRESS NOTE ADULT - ASSESSMENT
64 year old man type 2 diabetes, HLD, GERD, HFpEF with mild LV diastolic dysfunction, and decompensated JEAN cirrhosis, duodenal ulcer, GAVE and duodenal AVM s/p APC (last on 10/11/19) and SHENG who is now s/p liver transplant (7/2/20) with post-op course c/b tacrolimus toxicity and hospital delirium improving after stopping CNI and replacing w/ everolimus. Patient now presents 11/20/20 with worsening LE swelling and right foot wound concerning for OM however no focus on infection on NM WBC scan 11/24/20 or evidence of bone infection intraoperatively.    # MISA, mild - Baseline serum Cr (1.3-1.4), currently 1.6. Suspect pre-renal etiology vs. less likely ATN.     # Right heel wound - Chronic non-healing right foot ulcer with probe to bone; suspected poor wound healing in setting of uncontrolled type 2 diabetes and immunosuppression. MRI R. foot 11/2 limited study; wound culture 11/21 growing Pseudomonas; no focus on infection on NM WBC scan 11/24/20; no evidence of bone/soft tissue infection intraoperatively per Podiatry. Wound culture growing S. epidermidis - likely contaminant; bone biopsy culture - pending.     # S/p liver transplant 7/2/20: Transaminases and markers of synthetic function are normal with minimal immunosuppression.  Recipient Info:   ABO: A  CMV:  Neg  EBV Positive  Donor:  Donor ID:  [id number] Match: [id number]  Age: 29 ABO:  A1 High Risk:   No COD: Cardiovascular  Anti CMV positive, EBV IgG- positive  HepBcAb-neg, Hepatitis C-DANA- neg, Hepatitis C ab-neg    # Sacral decubitus ulcer: Stage II without signs of infection at this time  # Type 2 diabetes:  A1c 8.1% and blood sugars elevated above goal -likely contributing to poor wound healing   # SHENG     Recommendations:  - IV antibiotics and duration pending bone biopsy culture as per Transplant ID  - Bactrim SS PO daily and valganciclovir 450mg daily for ppx as per Transplant Surgery  - continue everolimus dosing by trough (currently 2mg q12); trend trough level daily, goal 3 ng/mL  - continue to hold MMF for active infection  - continue prednisone 5mg qd  - continue Lasix 20mg PO daily   - daily CMP and INR for MELD-Na      Vivian Guardado PGY-5  Gastroenterology Fellow  Pager #[phone]  E-mail [email or web address]  Call [phone] to speak to answering service for on-call GI fellow 5pm-7am and weekends.         64 year old man type 2 diabetes, HLD, GERD, HFpEF with mild LV diastolic dysfunction, and decompensated JEAN cirrhosis, duodenal ulcer, GAVE and duodenal AVM s/p APC (last on 10/11/19) and SHENG who is now s/p liver transplant (7/2/20) with post-op course c/b tacrolimus toxicity and hospital delirium improving after stopping CNI and replacing w/ everolimus. Patient now presents 11/20/20 with worsening LE swelling and right foot wound concerning for OM however no focus on infection on NM WBC scan 11/24/20 or evidence of bone infection intraoperatively.    # MISA, mild - Baseline serum Cr (1.3-1.4), currently 1.6. Suspect pre-renal etiology vs. less likely ATN.     # Right heel wound - Chronic non-healing right foot ulcer with probe to bone; suspected poor wound healing in setting of uncontrolled type 2 diabetes and immunosuppression. MRI R. foot 11/2 limited study; wound culture 11/21 growing Pseudomonas; no focus on infection on NM WBC scan 11/24/20; no evidence of bone/soft tissue infection intraoperatively per Podiatry. Wound culture growing S. epidermidis - likely contaminant; bone biopsy culture - pending.     # S/p liver transplant 7/2/20: Transaminases and markers of synthetic function are normal with minimal immunosuppression.  Recipient Info:   ABO: A  CMV:  Neg  EBV Positive  Donor:  Donor ID:  [id number] Match: [id number]  Age: 29 ABO:  A1 High Risk:   No COD: Cardiovascular  Anti CMV positive, EBV IgG- positive  HepBcAb-neg, Hepatitis C-DANA- neg, Hepatitis C ab-neg    # Sacral decubitus ulcer: Stage II without signs of infection at this time  # Type 2 diabetes:  A1c 8.1% and blood sugars elevated above goal -likely contributing to poor wound healing   # SHENG     Recommendations:  - IV antibiotics and duration pending bone biopsy culture as per Transplant ID  - Bactrim SS PO daily and valganciclovir 450mg daily for ppx as per Transplant Surgery  - continue everolimus dosing by trough (currently 2mg q12); trend trough level daily, goal 3 ng/mL  - remain off MMF  - continue prednisone 5mg qd  - continue Lasix 20mg PO daily   - daily CMP and INR for MELD-Na      Vivian Guardado PGY-5  Gastroenterology Fellow  Pager #[phone]  E-mail [email or web address]  Call [phone] to speak to answering service for on-call GI fellow 5pm-7am and weekends.

## 2020-12-01 LAB
ALBUMIN SERPL ELPH-MCNC: 3.4 G/DL — SIGNIFICANT CHANGE UP (ref 3.3–5)
ALP SERPL-CCNC: 117 U/L — SIGNIFICANT CHANGE UP (ref 40–120)
ALT FLD-CCNC: 26 U/L — SIGNIFICANT CHANGE UP (ref 10–45)
ANION GAP SERPL CALC-SCNC: 10 MMOL/L — SIGNIFICANT CHANGE UP (ref 5–17)
AST SERPL-CCNC: 16 U/L — SIGNIFICANT CHANGE UP (ref 10–40)
BASOPHILS # BLD AUTO: 0.04 K/UL — SIGNIFICANT CHANGE UP (ref 0–0.2)
BASOPHILS NFR BLD AUTO: 1.1 % — SIGNIFICANT CHANGE UP (ref 0–2)
BILIRUB SERPL-MCNC: 0.2 MG/DL — SIGNIFICANT CHANGE UP (ref 0.2–1.2)
BUN SERPL-MCNC: 48 MG/DL — HIGH (ref 7–23)
CALCIUM SERPL-MCNC: 9 MG/DL — SIGNIFICANT CHANGE UP (ref 8.4–10.5)
CHLORIDE SERPL-SCNC: 102 MMOL/L — SIGNIFICANT CHANGE UP (ref 96–108)
CO2 SERPL-SCNC: 24 MMOL/L — SIGNIFICANT CHANGE UP (ref 22–31)
CREAT SERPL-MCNC: 1.48 MG/DL — HIGH (ref 0.5–1.3)
EOSINOPHIL # BLD AUTO: 0.08 K/UL — SIGNIFICANT CHANGE UP (ref 0–0.5)
EOSINOPHIL NFR BLD AUTO: 2.2 % — SIGNIFICANT CHANGE UP (ref 0–6)
EVEROLIMUS, WHOLE BLOOD RESULT: 3.9 NG/ML — SIGNIFICANT CHANGE UP (ref 3–?)
EVEROLIMUS, WHOLE BLOOD RESULT: 4.1 NG/ML — SIGNIFICANT CHANGE UP (ref 3–?)
EVEROLIMUS, WHOLE BLOOD RESULT: 4.2 NG/ML — SIGNIFICANT CHANGE UP (ref 3–?)
EVEROLIMUS, WHOLE BLOOD RESULT: 5.4 NG/ML — SIGNIFICANT CHANGE UP (ref 3–8)
GLUCOSE SERPL-MCNC: 240 MG/DL — HIGH (ref 70–99)
HCT VFR BLD CALC: 26.9 % — LOW (ref 39–50)
HGB BLD-MCNC: 8.6 G/DL — LOW (ref 13–17)
IMM GRANULOCYTES NFR BLD AUTO: 0.6 % — SIGNIFICANT CHANGE UP (ref 0–1.5)
LYMPHOCYTES # BLD AUTO: 1.16 K/UL — SIGNIFICANT CHANGE UP (ref 1–3.3)
LYMPHOCYTES # BLD AUTO: 32.2 % — SIGNIFICANT CHANGE UP (ref 13–44)
MAGNESIUM SERPL-MCNC: 2 MG/DL — SIGNIFICANT CHANGE UP (ref 1.6–2.6)
MCHC RBC-ENTMCNC: 29 PG — SIGNIFICANT CHANGE UP (ref 27–34)
MCHC RBC-ENTMCNC: 32 GM/DL — SIGNIFICANT CHANGE UP (ref 32–36)
MCV RBC AUTO: 90.6 FL — SIGNIFICANT CHANGE UP (ref 80–100)
MONOCYTES # BLD AUTO: 0.36 K/UL — SIGNIFICANT CHANGE UP (ref 0–0.9)
MONOCYTES NFR BLD AUTO: 10 % — SIGNIFICANT CHANGE UP (ref 2–14)
NEUTROPHILS # BLD AUTO: 1.94 K/UL — SIGNIFICANT CHANGE UP (ref 1.8–7.4)
NEUTROPHILS NFR BLD AUTO: 53.9 % — SIGNIFICANT CHANGE UP (ref 43–77)
NRBC # BLD: 0 /100 WBCS — SIGNIFICANT CHANGE UP (ref 0–0)
PHOSPHATE SERPL-MCNC: 4 MG/DL — SIGNIFICANT CHANGE UP (ref 2.5–4.5)
PLATELET # BLD AUTO: 158 K/UL — SIGNIFICANT CHANGE UP (ref 150–400)
POTASSIUM SERPL-MCNC: 4.6 MMOL/L — SIGNIFICANT CHANGE UP (ref 3.5–5.3)
POTASSIUM SERPL-SCNC: 4.6 MMOL/L — SIGNIFICANT CHANGE UP (ref 3.5–5.3)
PROT SERPL-MCNC: 5.8 G/DL — LOW (ref 6–8.3)
RBC # BLD: 2.97 M/UL — LOW (ref 4.2–5.8)
RBC # FLD: 13.4 % — SIGNIFICANT CHANGE UP (ref 10.3–14.5)
SODIUM SERPL-SCNC: 136 MMOL/L — SIGNIFICANT CHANGE UP (ref 135–145)
WBC # BLD: 3.6 K/UL — LOW (ref 3.8–10.5)
WBC # FLD AUTO: 3.6 K/UL — LOW (ref 3.8–10.5)

## 2020-12-01 PROCEDURE — 99232 SBSQ HOSP IP/OBS MODERATE 35: CPT | Mod: GC

## 2020-12-01 PROCEDURE — 71045 X-RAY EXAM CHEST 1 VIEW: CPT | Mod: 26

## 2020-12-01 PROCEDURE — 99232 SBSQ HOSP IP/OBS MODERATE 35: CPT

## 2020-12-01 RX ORDER — INSULIN GLARGINE 100 [IU]/ML
10 INJECTION, SOLUTION SUBCUTANEOUS AT BEDTIME
Refills: 0 | Status: DISCONTINUED | OUTPATIENT
Start: 2020-12-01 | End: 2020-12-02

## 2020-12-01 RX ORDER — CEFEPIME 1 G/1
2000 INJECTION, POWDER, FOR SOLUTION INTRAMUSCULAR; INTRAVENOUS EVERY 8 HOURS
Refills: 0 | Status: DISCONTINUED | OUTPATIENT
Start: 2020-12-01 | End: 2020-12-02

## 2020-12-01 RX ORDER — CEFEPIME 1 G/1
2 INJECTION, POWDER, FOR SOLUTION INTRAMUSCULAR; INTRAVENOUS
Qty: 192 | Refills: 0
Start: 2020-12-01 | End: 2021-01-01

## 2020-12-01 RX ORDER — INSULIN LISPRO 100/ML
12 VIAL (ML) SUBCUTANEOUS
Refills: 0 | Status: DISCONTINUED | OUTPATIENT
Start: 2020-12-01 | End: 2020-12-02

## 2020-12-01 RX ADMIN — Medication 5 MILLIGRAM(S): at 06:12

## 2020-12-01 RX ADMIN — EVEROLIMUS 2 MILLIGRAM(S): 10 TABLET ORAL at 08:46

## 2020-12-01 RX ADMIN — Medication 650 MILLIGRAM(S): at 19:57

## 2020-12-01 RX ADMIN — CEFEPIME 100 MILLIGRAM(S): 1 INJECTION, POWDER, FOR SOLUTION INTRAMUSCULAR; INTRAVENOUS at 06:12

## 2020-12-01 RX ADMIN — MAGNESIUM OXIDE 400 MG ORAL TABLET 800 MILLIGRAM(S): 241.3 TABLET ORAL at 08:45

## 2020-12-01 RX ADMIN — VALGANCICLOVIR 450 MILLIGRAM(S): 450 TABLET, FILM COATED ORAL at 12:48

## 2020-12-01 RX ADMIN — Medication 4: at 18:41

## 2020-12-01 RX ADMIN — PANTOPRAZOLE SODIUM 40 MILLIGRAM(S): 20 TABLET, DELAYED RELEASE ORAL at 06:12

## 2020-12-01 RX ADMIN — CEFEPIME 100 MILLIGRAM(S): 1 INJECTION, POWDER, FOR SOLUTION INTRAMUSCULAR; INTRAVENOUS at 13:46

## 2020-12-01 RX ADMIN — EVEROLIMUS 2 MILLIGRAM(S): 10 TABLET ORAL at 19:58

## 2020-12-01 RX ADMIN — Medication 1 APPLICATION(S): at 17:03

## 2020-12-01 RX ADMIN — Medication 1 TABLET(S): at 12:48

## 2020-12-01 RX ADMIN — Medication 12 UNIT(S): at 18:40

## 2020-12-01 RX ADMIN — Medication 650 MILLIGRAM(S): at 20:27

## 2020-12-01 RX ADMIN — Medication 10 UNIT(S): at 08:45

## 2020-12-01 RX ADMIN — Medication 4: at 12:46

## 2020-12-01 RX ADMIN — CEFEPIME 100 MILLIGRAM(S): 1 INJECTION, POWDER, FOR SOLUTION INTRAMUSCULAR; INTRAVENOUS at 21:30

## 2020-12-01 RX ADMIN — Medication 4: at 08:44

## 2020-12-01 RX ADMIN — TAMSULOSIN HYDROCHLORIDE 0.4 MILLIGRAM(S): 0.4 CAPSULE ORAL at 21:30

## 2020-12-01 RX ADMIN — Medication 10 UNIT(S): at 12:46

## 2020-12-01 RX ADMIN — Medication 20 MILLIGRAM(S): at 06:12

## 2020-12-01 RX ADMIN — Medication 81 MILLIGRAM(S): at 12:47

## 2020-12-01 RX ADMIN — INSULIN GLARGINE 10 UNIT(S): 100 INJECTION, SOLUTION SUBCUTANEOUS at 21:30

## 2020-12-01 NOTE — PROGRESS NOTE ADULT - SUBJECTIVE AND OBJECTIVE BOX
Transplant Surgery Multidisciplinary Progress Note    OLT 7/2/2020    Present:   Patient seen and examined with multidisciplinary team including Transplant Surgeon: Dr. Chris, Dr. Alonzo, Dr. Ackerman, Transplant Nephrologist: Dr. YNES Patel, PGY 3 Dr. Leos, Transplant Pharmacist, Ancelmo Blanca, ACP's: Joseph Nguyen/Xiao Hurley and unit RN during am rounds.  Disciplines not in attendance will be notified of the plan.     HPI: 64M with IDDM, HLD, obesity, HFpEF with mild LV diastolic dysfunction, MGUS, chronic anemia with a history of duodenal ulcer 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  Known history of non healing R heel ulcer. Followed by podiatry outpatient Dr. Patel, had recent debridement wound (10/20) cx grew ec faecalis, pseudomonas, enterococcus. Tx with cipro.  No improvement post debridement and PO antibiotic treatment.    Admitted with non healing R heel ulcer.   ·	R wound cx on admission grew Pseudomonas resistant to cipro; started on meropenem 11/20-11/23, vanco 11/23-11/25, Cefepime 11/23  ·	MRI c/w early OM. Podiatry and ID consulted. Broadened to meropenem. MMF held on admission, Everolimus reduced  ·	WBC scan -neg for OM  OR (11/24)-> wound debridement (cxs grew staph epi), bone bx path report  aggregate of gray-tan, necrotic, soft tissue fragments  ·	MISA: renal us no hydro; patent vessels  ·	Neutropenia: valcyte dose reduced 450mg daily (was on 900mg daily CMV +/-), MMF held in setting of infection     Interval Events:   - afebrile, stable VSS, on cefepime  - POD 7 s/p R heal wound debridement (wound cx grew staph epi); Bone biopsy path report  aggregate of gray-tan, necrotic, soft tissue fragments  , on cefepime  - Lasix 20mg PO qd  - SCr down to 1.48 from 1.6   UOP 3.4L    Potential Discharge date: pending clinical improvement     Education:  Medications    Plan of care:  See Below       MEDICATIONS  (STANDING):  aspirin enteric coated 81 milliGRAM(s) Oral daily  cefepime   IVPB 2000 milliGRAM(s) IV Intermittent every 12 hours  collagenase Ointment 1 Application(s) Topical daily  dextrose 40% Gel 15 Gram(s) Oral once  dextrose 5%. 1000 milliLiter(s) (50 mL/Hr) IV Continuous <Continuous>  dextrose 5%. 1000 milliLiter(s) (100 mL/Hr) IV Continuous <Continuous>  dextrose 5%. 1000 milliLiter(s) (100 mL/Hr) IV Continuous <Continuous>  dextrose 50% Injectable 25 Gram(s) IV Push once  dextrose 50% Injectable 12.5 Gram(s) IV Push once  dextrose 50% Injectable 25 Gram(s) IV Push once  everolimus (ZORTRESS) 2 milliGRAM(s) Oral <User Schedule>  furosemide    Tablet 20 milliGRAM(s) Oral daily  glucagon  Injectable 1 milliGRAM(s) IntraMuscular once  insulin glargine Injectable (LANTUS) 8 Unit(s) SubCutaneous at bedtime  insulin lispro (ADMELOG) corrective regimen sliding scale   SubCutaneous three times a day before meals  insulin lispro (ADMELOG) corrective regimen sliding scale   SubCutaneous at bedtime  insulin lispro Injectable (ADMELOG) 10 Unit(s) SubCutaneous three times a day before meals  magnesium oxide 800 milliGRAM(s) Oral with breakfast  pantoprazole    Tablet 40 milliGRAM(s) Oral before breakfast  predniSONE   Tablet 5 milliGRAM(s) Oral daily  tamsulosin 0.4 milliGRAM(s) Oral at bedtime  trimethoprim   80 mG/sulfamethoxazole 400 mG 1 Tablet(s) Oral daily  valGANciclovir 450 milliGRAM(s) Oral daily    MEDICATIONS  (PRN):  acetaminophen   Tablet .. 650 milliGRAM(s) Oral every 6 hours PRN Moderate Pain (4 - 6)      PAST MEDICAL & SURGICAL HISTORY:  GIB (gastrointestinal bleeding)    GERD with esophagitis  Gastritis &amp; Non Bleeding Ulcers    Hepatic encephalopathy    Obesity    Fatty liver disease, nonalcoholic    Renal stones  25 years ago    Hypertension    Neuropathy    Hypercholesteremia    Diabetes    S/P cholecystectomy        Vital Signs Last 24 Hrs  T(C): 36.4 (01 Dec 2020 08:26), Max: 36.9 (01 Dec 2020 01:00)  T(F): 97.5 (01 Dec 2020 08:26), Max: 98.5 (01 Dec 2020 01:00)  HR: 79 (01 Dec 2020 08:26) (79 - 90)  BP: 119/67 (01 Dec 2020 08:26) (119/67 - 144/78)  BP(mean): --  RR: 20 (01 Dec 2020 08:26) (18 - 20)  SpO2: 98% (01 Dec 2020 08:26) (95% - 99%)    I&O's Summary    30 Nov 2020 07:01  -  01 Dec 2020 07:00  --------------------------------------------------------  IN: 1240 mL / OUT: 3425 mL / NET: -2185 mL                              8.6    3.60  )-----------( 158      ( 01 Dec 2020 05:54 )             26.9     12-01    136  |  102  |  48<H>  ----------------------------<  240<H>  4.6   |  24  |  1.48<H>    Ca    9.0      01 Dec 2020 05:55  Phos  4.0     12-01  Mg     2.0     12-01    TPro  5.8<L>  /  Alb  3.4  /  TBili  0.2  /  DBili  x   /  AST  16  /  ALT  26  /  AlkPhos  117  12-01          Culture - Tissue with Gram Stain (collected 11-25-20 @ 00:55)  Source: .Tissue Other  Gram Stain (11-25-20 @ 04:24):    No polymorphonuclear leukocytes seen per low power field    No organisms seen per oil power field  Final Report (11-29-20 @ 21:12):    Growth in fluid media only Staphylococcus epidermidis    "Susceptibilities not performed"  Organism: Staphylococcus epidermidis (11-29-20 @ 21:12)  Organism: Staphylococcus epidermidis (11-29-20 @ 21:12)      Review of systems  Gen: No weight changes, fatigue, fevers/chills, weakness  Skin: R heel wound  Head/Eyes/Ears/Mouth: No headache; Normal hearing; Normal vision w/o blurriness; No sinus pain/discomfort, sore throat  Respiratory: No dyspnea, cough, wheezing, hemoptysis  CV: No chest pain, PND, orthopnea  GI: no abdominal pain  : No increased frequency, dysuria, hematuria, nocturia  MSK: No joint pain/swelling; no back pain; + LE edema  Neuro: No dizziness/lightheadedness, weakness, seizures, numbness, tingling  Heme: No easy bruising or bleeding  Endo: No heat/cold intolerance  Psych: No significant nervousness, anxiety, stress, depression  All other systems were reviewed and are negative, except as noted.    Physical Exam:   GENERAL:  No acute distress  HEENT:  Normocephalic/atraumatic, no scleral icterus  CHEST:  Clear to auscultation bilaterally, no wheezes/rales/ronchi, no accessory muscle use  HEART:  Regular rate and rhythm, no murmurs/rubs/gallops  ABDOMEN:  Soft, non-tender, non-distended,  well healed incision  EXTREMITIES: RLE dressing intact, no drainage noted. +1 BLE edema.  SKIN:  No rash/erythema/ecchymoses/petechiae/wounds/abscess/warm/dry  NEURO:  Alert and oriented x 3, no asterixis

## 2020-12-01 NOTE — PROGRESS NOTE ADULT - ASSESSMENT
s/p liver transplant  osteomyelitis     cont regular diet  IV antibiotics per ID  rejection regimen per transplant service  oob as tolerated  bone biopsy path c/w OM, podiatry follow-up noted

## 2020-12-01 NOTE — PROGRESS NOTE ADULT - ASSESSMENT
63 yo male patient s/p Right heel wound debridement with Stravix graft application and bone biopsy (DOS 11/24/20)  - Pt seen and evaluated   - Afebrile, no leukocytosis  - Right foot surgical wound stable with graft intact with secured adaptic, no hematoma/seroma, no drainage, no signs of infection  - WBAT to right toe touch in heel relief shoe  - Previously with low concern for bone infection however final calcaneus bone biopsy resulted with osteomyelitis - will require long term Abx per final ID recs  - Pending final ID recs, patient is stable for discharge  - Follow up at wound care center upon discharge (info in paperwork)  - d/w attending

## 2020-12-01 NOTE — PROGRESS NOTE ADULT - SUBJECTIVE AND OBJECTIVE BOX
Chief Complaint:  Patient is a 64y old  Male who presents with a chief complaint of R heel wound (01 Dec 2020 10:23)      Interval Events:     No acute complaints.   Bone biopsy culture demonstrates osteomyelitis.   Serum Cr normalized to baseline range today.     Allergies:  codeine (Anaphylaxis)      Hospital Medications:  acetaminophen   Tablet .. 650 milliGRAM(s) Oral every 6 hours PRN  aspirin enteric coated 81 milliGRAM(s) Oral daily  cefepime   IVPB 2000 milliGRAM(s) IV Intermittent every 8 hours  collagenase Ointment 1 Application(s) Topical daily  dextrose 40% Gel 15 Gram(s) Oral once  dextrose 5%. 1000 milliLiter(s) IV Continuous <Continuous>  dextrose 5%. 1000 milliLiter(s) IV Continuous <Continuous>  dextrose 5%. 1000 milliLiter(s) IV Continuous <Continuous>  dextrose 50% Injectable 25 Gram(s) IV Push once  dextrose 50% Injectable 12.5 Gram(s) IV Push once  dextrose 50% Injectable 25 Gram(s) IV Push once  everolimus (ZORTRESS) 2 milliGRAM(s) Oral <User Schedule>  furosemide    Tablet 20 milliGRAM(s) Oral daily  glucagon  Injectable 1 milliGRAM(s) IntraMuscular once  insulin glargine Injectable (LANTUS) 8 Unit(s) SubCutaneous at bedtime  insulin lispro (ADMELOG) corrective regimen sliding scale   SubCutaneous three times a day before meals  insulin lispro (ADMELOG) corrective regimen sliding scale   SubCutaneous at bedtime  insulin lispro Injectable (ADMELOG) 10 Unit(s) SubCutaneous three times a day before meals  magnesium oxide 800 milliGRAM(s) Oral with breakfast  pantoprazole    Tablet 40 milliGRAM(s) Oral before breakfast  predniSONE   Tablet 5 milliGRAM(s) Oral daily  tamsulosin 0.4 milliGRAM(s) Oral at bedtime  trimethoprim   80 mG/sulfamethoxazole 400 mG 1 Tablet(s) Oral daily  valGANciclovir 450 milliGRAM(s) Oral daily      PMHX/PSHX:  GIB (gastrointestinal bleeding)    GERD with esophagitis    Hepatic encephalopathy    Obesity    Fatty liver disease, nonalcoholic    Renal stones    Hypertension    Neuropathy    Hypercholesteremia    Diabetes    S/P cholecystectomy    No significant past surgical history        Family history:  Family history of type 2 diabetes mellitus    Family history of hypertension    Family history of stomach cancer    No pertinent family history in first degree relatives        ROS:     General:  No weight loss, fevers, chills, night sweats, fatigue   Eyes:  No vision changes  ENT:  No sore throat, pain, runny nose  CV:  No chest pain, palpitations, dizziness   Resp:  No SOB, cough, wheezing  GI:  See HPI  :  No burning with urination, hematuria  Muscle:  No pain, weakness  Neuro:  No weakness/tingling, memory problems  Psych:  No fatigue, insomnia, mood problems, depression  Heme:  No easy bruisability  Skin:  No rash, edema      PHYSICAL EXAM:     GENERAL:  No distress, resting comfortably in bed  HEENT:  Conjunctivae clear, sclera anicteric  CHEST:  No increased effort w/ respirations  HEART:  Regular rhythm & rate  ABDOMEN:  Soft, non-tender, non-distended  EXTREMITIES: +1 edema in RLE +R. foot covered in ACE wrap  SKIN:  No rash/erythema/ecchymoses/petechiae/jaundice   NEURO:  Alert, orientedx3    Vital Signs:  Vital Signs Last 24 Hrs  T(C): 36.4 (01 Dec 2020 08:26), Max: 36.9 (01 Dec 2020 01:00)  T(F): 97.5 (01 Dec 2020 08:26), Max: 98.5 (01 Dec 2020 01:00)  HR: 79 (01 Dec 2020 08:26) (79 - 90)  BP: 119/67 (01 Dec 2020 08:26) (119/67 - 144/78)  BP(mean): --  RR: 20 (01 Dec 2020 08:26) (18 - 20)  SpO2: 98% (01 Dec 2020 08:26) (95% - 99%)  Daily     Daily Weight in k.3 (01 Dec 2020 05:00)    LABS:                        8.6    3.60  )-----------( 158      ( 01 Dec 2020 05:54 )             26.9     12    136  |  102  |  48<H>  ----------------------------<  240<H>  4.6   |  24  |  1.48<H>    Ca    9.0      01 Dec 2020 05:55  Phos  4.0       Mg     2.0         TPro  5.8<L>  /  Alb  3.4  /  TBili  0.2  /  DBili  x   /  AST  16  /  ALT  26  /  AlkPhos  117  12    LIVER FUNCTIONS - ( 01 Dec 2020 05:55 )  Alb: 3.4 g/dL / Pro: 5.8 g/dL / ALK PHOS: 117 U/L / ALT: 26 U/L / AST: 16 U/L / GGT: x             Imaging:

## 2020-12-01 NOTE — PROGRESS NOTE ADULT - ASSESSMENT
64 year old male with past medical history of cirrhosis s/p OLT (7/2/20 at [facility]), IDDM, MGUS who presented to [facility] on 11/20 with worsening drainage at his right heel with concern for osteomyelitis.    Superficial Cultures here with pseudomonas   MRSA Nasal PCR negative  s/p right foot heel incision and drainage w/ application of stravix and bone biopsy on 11/24  Per Podiatry low concern for OM    Wound cultures thus far with Staph epi but only growing in liquid broth (likely contaminant)  Pathology from bone biopsy with Osteomyelitis    Overall, Calcaneus OM, Positive Culture Finding, Liver Transplant Recipient, Prophylactic Antibiotic    --I would pursue PICC Placement  --Plan for 6 more weeks of Cefepime and would increase dose to 2g IV Q8H as patient's Cr Cl is now 60-70  --Recommend CBC with Diff and CMP qWeekly while on antimicrobials. Please have results faxed to [phone]  --Patient should follow up with me in the Infectious Diseases Office at [address] [place] [address] in 3-4 weeks time. Office contact number is [phone]  --Continue Bactrim SS PO QD for PCP PPx  --Continue Valcyte for CMV PPx (D+/R-)    I will continue to follow. Please feel free to contact me with any further questions.    Eusebio Pérez M.D.  [facility] Division of Infectious Disease  8AM-5PM: Pager Number [phone]  After Hours (or if no response): Please contact the Infectious Diseases Office at [phone]     The above assessment and plan were discussed with transplant surgery team

## 2020-12-01 NOTE — PROGRESS NOTE ADULT - ASSESSMENT
64M with IDDM, HLD, obesity, HFpEF with mild LV diastolic dysfunction, MGUS, chronic anemia with a history of duodenal ulcer 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 Known history of non healing R heel ulcer. Followed by podiatry outpatient Dr. Patel, had recent debridement wound (10/20) cx grew ec faecalis, pseudomonas, enterococcus. Tx with Cipro without improvement. Admitted with non healing R foot ulcer    [] R heel ulcer  - MRI c/w early OM, Tagged WBC scan negative.  - S/p OR for right heal debridement 11/24  - Wound culture on admission grew pseudomonas (resistant to cipro), wound cx from OR grew Staph epi; Bone biopsy Path aggregate of gray-tan, necrotic, soft tissue fragments  - Podiatry following  - ID following: cont cefepime pending ID recs  - DISPO plan pending antibiotic regimen  - Plan for possible long term abx will need PICC    [] MISA  - SCr 1.4 from 1.6; good u/o   - Continue PO Lasix  - Strict I/Os    [] s/p OLT with good graft function   - Immuno: Everolimus 2/2, MMF on hold in setting of infection, Pred 5  - PPx: bactrim/valcyte (currently on 450mg QD decreased from 900 QD 2/2 low WBC)  - Daily CBC, BMP  - Lasix 20mg PO daily     [] DM  - Glucose controlled  - Fingersticks ac and qhs  - Continue glargine 8u qhs and  lispro 10u with meals  - SSI coverage    [] Dispo   - dc planning pending bone biopsy results and final ID rec's

## 2020-12-01 NOTE — PROGRESS NOTE ADULT - SUBJECTIVE AND OBJECTIVE BOX
Patient is a 64y old  Male who presents with a chief complaint of R heel wound (30 Nov 2020 16:15)       INTERVAL HPI/OVERNIGHT EVENTS:  Patient seen and evaluated at bedside.  Pt is resting comfortable in NAD. Denies N/V/F/C.    Allergies    codeine (Anaphylaxis)    Intolerances        Vital Signs Last 24 Hrs  T(C): 36.5 (01 Dec 2020 05:00), Max: 36.9 (01 Dec 2020 01:00)  T(F): 97.7 (01 Dec 2020 05:00), Max: 98.5 (01 Dec 2020 01:00)  HR: 79 (01 Dec 2020 05:00) (79 - 90)  BP: 130/74 (01 Dec 2020 05:00) (128/76 - 144/78)  BP(mean): --  RR: 18 (01 Dec 2020 05:00) (18 - 20)  SpO2: 95% (01 Dec 2020 05:00) (95% - 99%)    LABS:                        8.6    3.60  )-----------( 158      ( 01 Dec 2020 05:54 )             26.9     12-01    136  |  102  |  48<H>  ----------------------------<  240<H>  4.6   |  24  |  1.48<H>    Ca    9.0      01 Dec 2020 05:55  Phos  4.0     12-01  Mg     2.0     12-01    TPro  5.8<L>  /  Alb  3.4  /  TBili  0.2  /  DBili  x   /  AST  16  /  ALT  26  /  AlkPhos  117  12-01        CAPILLARY BLOOD GLUCOSE      POCT Blood Glucose.: 182 mg/dL (30 Nov 2020 21:46)  POCT Blood Glucose.: 229 mg/dL (30 Nov 2020 19:23)  POCT Blood Glucose.: 239 mg/dL (30 Nov 2020 12:14)      Lower Extremity Physical Exam:  Vascular: DP/PT non-palpable 2/2 edema R foot, L foot unna boot left clean dry and intact, CFT <3 seconds B/L, Temperature gradient R warm to warm  Neuro: Epicritic sensation intact to the level of heel, B/L.    s/p Right heel wound debridement with Stravix graft application and bone biopsy (DOS 11/24/20)  Right foot surgical wound stable with graft intact with secured adaptic, no hematoma/seroma, no drainage, no signs of infection    RADIOLOGY & ADDITIONAL TESTS:

## 2020-12-01 NOTE — PROGRESS NOTE ADULT - ASSESSMENT
64 year old man type 2 diabetes, HLD, GERD, HFpEF with mild LV diastolic dysfunction, and decompensated JEAN cirrhosis, duodenal ulcer, GAVE and duodenal AVM s/p APC (last on 10/11/19) and SHENG who is now s/p liver transplant (7/2/20) with post-op course c/b tacrolimus toxicity and hospital delirium improving after stopping CNI and replacing w/ everolimus. Patient now presents 11/20/20 with worsening LE swelling and right foot wound concerning for OM however no focus on infection on NM WBC scan 11/24/20 or evidence of bone infection intraoperatively.      # Chronic non-healing right foot ulcer: Suspect poor wound healing in setting of uncontrolled type 2 diabetes and immunosuppression, now s/p surgical debridement and graft placement on 11/24 by Podiatry. MRI of R. foot 11/2 limited study; wound culture 11/21 growing Pseudomonas; no focus on infection on NM WBC scan 11/24/20; no evidence of bone/soft tissue infection intraoperatively per Podiatry. Wound culture 11/24 growing S. epidermidis - likely contaminant; bone biopsy culture 11/24 demonstrates osteomyelitis.      # MISA, mild - Serum Cr now within baseline range (1.3-1.4),. Suspect pre-renal etiology vs. less likely ATN.     # S/p liver transplant 7/2/20: Transaminases and markers of synthetic function are normal with minimal immunosuppression.  Recipient Info:   ABO: A  CMV:  Neg  EBV Positive  Donor:  Donor ID:  [id number] Match: [id number]  Age: 29 ABO:  A1 High Risk:   No COD: Cardiovascular  Anti CMV positive, EBV IgG- positive  HepBcAb-neg, Hepatitis C-DANA- neg, Hepatitis C ab-neg    # Sacral decubitus ulcer: Stage II without signs of infection at this time  # Type 2 diabetes:  A1c 8.1% and blood sugars elevated above goal -likely contributing to poor wound healing   # SHENG     Recommendations:  - IV antibiotics and duration for osteomyelitis demonstrated in right calcaneus as per Transplant ID  - Bactrim SS PO daily and valganciclovir 450mg daily for ppx as per Transplant Surgery  - continue everolimus dosing by trough (currently 2mg q12); trend trough level daily, goal 3 ng/mL  - remain off MMF  - continue prednisone 5mg qd  - continue Lasix 20mg PO daily   - daily CMP and INR for MELD-Na      Vivian Guardado PGY-5  Gastroenterology Fellow  Pager #[phone]  E-mail [email or web address]  Call [phone] to speak to answering service for on-call GI fellow 5pm-7am and weekends.

## 2020-12-01 NOTE — PROGRESS NOTE ADULT - SUBJECTIVE AND OBJECTIVE BOX
Follow Up:  Foot OM, OLT    Interval History:    REVIEW OF SYSTEMS  [  ] ROS unobtainable because:    [  ] All other systems negative except as noted below    Constitutional:  [ ] fever [ ] chills  [ ] weight loss  [ ] weakness  Skin:  [ ] rash [ ] phlebitis	  Eyes: [ ] icterus [ ] pain  [ ] discharge	  ENMT: [ ] sore throat  [ ] thrush [ ] ulcers [ ] exudates  Respiratory: [ ] dyspnea [ ] hemoptysis [ ] cough [ ] sputum	  Cardiovascular:  [ ] chest pain [ ] palpitations [ ] edema	  Gastrointestinal:  [ ] nausea [ ] vomiting [ ] diarrhea [ ] constipation [ ] pain	  Genitourinary:  [ ] dysuria [ ] frequency [ ] hematuria [ ] discharge [ ] flank pain  [ ] incontinence  Musculoskeletal:  [ ] myalgias [ ] arthralgias [ ] arthritis  [ ] back pain  Neurological:  [ ] headache [ ] seizures  [ ] confusion/altered mental status    Allergies  codeine (Anaphylaxis)        ANTIMICROBIALS:  cefepime   IVPB 2000 every 8 hours  trimethoprim   80 mG/sulfamethoxazole 400 mG 1 daily  valGANciclovir 450 daily      OTHER MEDS:  MEDICATIONS  (STANDING):  acetaminophen   Tablet .. 650 every 6 hours PRN  aspirin enteric coated 81 daily  dextrose 40% Gel 15 once  dextrose 50% Injectable 25 once  dextrose 50% Injectable 12.5 once  dextrose 50% Injectable 25 once  everolimus (ZORTRESS) 2 <User Schedule>  furosemide    Tablet 20 daily  glucagon  Injectable 1 once  insulin glargine Injectable (LANTUS) 10 at bedtime  insulin lispro (ADMELOG) corrective regimen sliding scale  three times a day before meals  insulin lispro (ADMELOG) corrective regimen sliding scale  at bedtime  insulin lispro Injectable (ADMELOG) 12 three times a day before meals  pantoprazole    Tablet 40 before breakfast  predniSONE   Tablet 5 daily  tamsulosin 0.4 at bedtime      Vital Signs Last 24 Hrs  T(C): 36.4 (01 Dec 2020 12:46), Max: 36.9 (01 Dec 2020 01:00)  T(F): 97.5 (01 Dec 2020 12:46), Max: 98.5 (01 Dec 2020 01:00)  HR: 77 (01 Dec 2020 12:46) (77 - 90)  BP: 143/77 (01 Dec 2020 12:46) (119/67 - 143/77)  BP(mean): --  RR: 20 (01 Dec 2020 12:46) (18 - 20)  SpO2: 98% (01 Dec 2020 12:46) (95% - 98%)    PHYSICAL EXAMINATION:  General: Alert and Awake, NAD  HEENT: PERRL, EOMI  Neck: Supple  Cardiac: RRR, No M/R/G  Resp: CTAB, No Wh/Rh/Ra  Abdomen: NBS, NT/ND, No HSM, No rigidity or guarding  MSK: No LE edema. No Calf tenderness  : No bob  Skin: No rashes or lesions. Skin is warm and dry to the touch.   Neuro: Alert and Awake. CN 2-12 Grossly intact. Moves all four extremities spontaneously.  Psych: Calm, Pleasant, Cooperative                          8.6    3.60  )-----------( 158      ( 01 Dec 2020 05:54 )             26.9       12-01    136  |  102  |  48<H>  ----------------------------<  240<H>  4.6   |  24  |  1.48<H>    Ca    9.0      01 Dec 2020 05:55  Phos  4.0     12-01  Mg     2.0     12-01    TPro  5.8<L>  /  Alb  3.4  /  TBili  0.2  /  DBili  x   /  AST  16  /  ALT  26  /  AlkPhos  117  12-01          MICROBIOLOGY:  v  .Tissue Other  11-25-20   Growth in fluid media only Staphylococcus epidermidis  "Susceptibilities not performed"  --  Staphylococcus epidermidis      Skin wound  11-21-20   Few Pseudomonas aeruginosa  Normal skin ninfa isolated  --  Pseudomonas aeruginosa      .Blood Blood-Peripheral  11-20-20   No Growth Final  --  --        CMV IgG Antibody: <0.20 U/mL (12-04-19 @ 08:33)  Toxoplasma IgG Screen: <3.0 IU/mL (12-04-19 @ 08:33)          RADIOLOGY:    <The imaging below has been reviewed and visualized by me independently. Findings as detailed in report below> Follow Up:  Foot OM, OLT    Interval History: afebrile overnight. no acute overnight events. bone pathology returned with OM     REVIEW OF SYSTEMS  [  ] ROS unobtainable because:    [ x ] All other systems negative except as noted below    Constitutional:  [ ] fever [ ] chills  [ ] weight loss  [ ] weakness  Skin:  [ ] rash [ ] phlebitis	  Eyes: [ ] icterus [ ] pain  [ ] discharge	  ENMT: [ ] sore throat  [ ] thrush [ ] ulcers [ ] exudates  Respiratory: [ ] dyspnea [ ] hemoptysis [ ] cough [ ] sputum	  Cardiovascular:  [ ] chest pain [ ] palpitations [ ] edema	  Gastrointestinal:  [ ] nausea [ ] vomiting [ ] diarrhea [ ] constipation [ ] pain	  Genitourinary:  [ ] dysuria [ ] frequency [ ] hematuria [ ] discharge [ ] flank pain  [ ] incontinence  Musculoskeletal:  [ ] myalgias [ ] arthralgias [ ] arthritis  [ ] back pain  Neurological:  [ ] headache [ ] seizures  [ ] confusion/altered mental status    Allergies  codeine (Anaphylaxis)        ANTIMICROBIALS:  cefepime   IVPB 2000 every 8 hours  trimethoprim   80 mG/sulfamethoxazole 400 mG 1 daily  valGANciclovir 450 daily      OTHER MEDS:  MEDICATIONS  (STANDING):  acetaminophen   Tablet .. 650 every 6 hours PRN  aspirin enteric coated 81 daily  dextrose 40% Gel 15 once  dextrose 50% Injectable 25 once  dextrose 50% Injectable 12.5 once  dextrose 50% Injectable 25 once  everolimus (ZORTRESS) 2 <User Schedule>  furosemide    Tablet 20 daily  glucagon  Injectable 1 once  insulin glargine Injectable (LANTUS) 10 at bedtime  insulin lispro (ADMELOG) corrective regimen sliding scale  three times a day before meals  insulin lispro (ADMELOG) corrective regimen sliding scale  at bedtime  insulin lispro Injectable (ADMELOG) 12 three times a day before meals  pantoprazole    Tablet 40 before breakfast  predniSONE   Tablet 5 daily  tamsulosin 0.4 at bedtime      Vital Signs Last 24 Hrs  T(C): 36.4 (01 Dec 2020 12:46), Max: 36.9 (01 Dec 2020 01:00)  T(F): 97.5 (01 Dec 2020 12:46), Max: 98.5 (01 Dec 2020 01:00)  HR: 77 (01 Dec 2020 12:46) (77 - 90)  BP: 143/77 (01 Dec 2020 12:46) (119/67 - 143/77)  BP(mean): --  RR: 20 (01 Dec 2020 12:46) (18 - 20)  SpO2: 98% (01 Dec 2020 12:46) (95% - 98%)    PHYSICAL EXAMINATION:  General: Alert and Awake, NAD  HEENT: PERRL, EOMI  Neck: Supple  Cardiac: RRR, No M/R/G  Resp: CTAB, No Wh/Rh/Ra  Abdomen: NBS, NT/ND, No HSM, No rigidity or guarding  MSK: +dressing over right foot. No LE edema. No Calf tenderness  : No bob  Skin: No rashes or lesions. Skin is warm and dry to the touch.   Neuro: Alert and Awake. CN 2-12 Grossly intact. Moves all four extremities spontaneously.  Psych: Calm, Pleasant, Cooperative                        8.6    3.60  )-----------( 158      ( 01 Dec 2020 05:54 )             26.9       12-01    136  |  102  |  48<H>  ----------------------------<  240<H>  4.6   |  24  |  1.48<H>    Ca    9.0      01 Dec 2020 05:55  Phos  4.0     12-01  Mg     2.0     12-01    TPro  5.8<L>  /  Alb  3.4  /  TBili  0.2  /  DBili  x   /  AST  16  /  ALT  26  /  AlkPhos  117  12-01          MICROBIOLOGY:  v  .Tissue Other  11-25-20   Growth in fluid media only Staphylococcus epidermidis  "Susceptibilities not performed"  --  Staphylococcus epidermidis      Skin wound  11-21-20   Few Pseudomonas aeruginosa  Normal skin ninfa isolated  --  Pseudomonas aeruginosa      .Blood Blood-Peripheral  11-20-20   No Growth Final  --  --        CMV IgG Antibody: <0.20 U/mL (12-04-19 @ 08:33)  Toxoplasma IgG Screen: <3.0 IU/mL (12-04-19 @ 08:33)          RADIOLOGY:    <The imaging below has been reviewed and visualized by me independently. Findings as detailed in report below>    EXAM:  US DPLX KIDNEY(IES)                        PROCEDURE DATE:  11/27/2020    No hydronephrosis.  No evidence of a hemodynamically significant left renal artery stenosis.  Nonvisualization of the right renal artery origin. No distal tardus parvus waveforms to suggest a hemodynamically significant stenosis.  Patent renal veins.    EXAM:  NM INFLAMM LOC WBC WB SD                        EXAM:  NM MULTI DAY PROCEDURE                        EXAM:  NM BONE MARROW IMG MULT AREAS                        EXAM:  NM INFLAMM LOC SPECT CT SA SD                        PROCEDURE DATE:  11/24/2020    Combined Indium-111 labeled leukocyte study and marrow scan demonstrates:  No scan evidence of osteomyelitis in the right heel.  Findings raise the possibility of neuropathic joint in the right midfoot.

## 2020-12-02 ENCOUNTER — TRANSCRIPTION ENCOUNTER (OUTPATIENT)
Age: 65
End: 2020-12-02

## 2020-12-02 VITALS
DIASTOLIC BLOOD PRESSURE: 67 MMHG | SYSTOLIC BLOOD PRESSURE: 113 MMHG | OXYGEN SATURATION: 98 % | TEMPERATURE: 98 F | HEART RATE: 82 BPM | RESPIRATION RATE: 20 BRPM

## 2020-12-02 LAB
ALBUMIN SERPL ELPH-MCNC: 3.7 G/DL — SIGNIFICANT CHANGE UP (ref 3.3–5)
ALP SERPL-CCNC: 126 U/L — HIGH (ref 40–120)
ALT FLD-CCNC: 26 U/L — SIGNIFICANT CHANGE UP (ref 10–45)
ANION GAP SERPL CALC-SCNC: 12 MMOL/L — SIGNIFICANT CHANGE UP (ref 5–17)
AST SERPL-CCNC: 18 U/L — SIGNIFICANT CHANGE UP (ref 10–40)
BASOPHILS # BLD AUTO: 0.03 K/UL — SIGNIFICANT CHANGE UP (ref 0–0.2)
BASOPHILS NFR BLD AUTO: 0.7 % — SIGNIFICANT CHANGE UP (ref 0–2)
BILIRUB SERPL-MCNC: 0.2 MG/DL — SIGNIFICANT CHANGE UP (ref 0.2–1.2)
BUN SERPL-MCNC: 50 MG/DL — HIGH (ref 7–23)
CALCIUM SERPL-MCNC: 9.4 MG/DL — SIGNIFICANT CHANGE UP (ref 8.4–10.5)
CHLORIDE SERPL-SCNC: 105 MMOL/L — SIGNIFICANT CHANGE UP (ref 96–108)
CO2 SERPL-SCNC: 22 MMOL/L — SIGNIFICANT CHANGE UP (ref 22–31)
CREAT SERPL-MCNC: 1.57 MG/DL — HIGH (ref 0.5–1.3)
EOSINOPHIL # BLD AUTO: 0.07 K/UL — SIGNIFICANT CHANGE UP (ref 0–0.5)
EOSINOPHIL NFR BLD AUTO: 1.6 % — SIGNIFICANT CHANGE UP (ref 0–6)
GLUCOSE SERPL-MCNC: 232 MG/DL — HIGH (ref 70–99)
HCT VFR BLD CALC: 27.7 % — LOW (ref 39–50)
HGB BLD-MCNC: 8.9 G/DL — LOW (ref 13–17)
IMM GRANULOCYTES NFR BLD AUTO: 0.7 % — SIGNIFICANT CHANGE UP (ref 0–1.5)
LYMPHOCYTES # BLD AUTO: 1.07 K/UL — SIGNIFICANT CHANGE UP (ref 1–3.3)
LYMPHOCYTES # BLD AUTO: 25.1 % — SIGNIFICANT CHANGE UP (ref 13–44)
MAGNESIUM SERPL-MCNC: 2.1 MG/DL — SIGNIFICANT CHANGE UP (ref 1.6–2.6)
MCHC RBC-ENTMCNC: 29.6 PG — SIGNIFICANT CHANGE UP (ref 27–34)
MCHC RBC-ENTMCNC: 32.1 GM/DL — SIGNIFICANT CHANGE UP (ref 32–36)
MCV RBC AUTO: 92 FL — SIGNIFICANT CHANGE UP (ref 80–100)
MONOCYTES # BLD AUTO: 0.53 K/UL — SIGNIFICANT CHANGE UP (ref 0–0.9)
MONOCYTES NFR BLD AUTO: 12.4 % — SIGNIFICANT CHANGE UP (ref 2–14)
NEUTROPHILS # BLD AUTO: 2.54 K/UL — SIGNIFICANT CHANGE UP (ref 1.8–7.4)
NEUTROPHILS NFR BLD AUTO: 59.5 % — SIGNIFICANT CHANGE UP (ref 43–77)
NRBC # BLD: 0 /100 WBCS — SIGNIFICANT CHANGE UP (ref 0–0)
PHOSPHATE SERPL-MCNC: 3.4 MG/DL — SIGNIFICANT CHANGE UP (ref 2.5–4.5)
PLATELET # BLD AUTO: 155 K/UL — SIGNIFICANT CHANGE UP (ref 150–400)
POTASSIUM SERPL-MCNC: 5 MMOL/L — SIGNIFICANT CHANGE UP (ref 3.5–5.3)
POTASSIUM SERPL-SCNC: 5 MMOL/L — SIGNIFICANT CHANGE UP (ref 3.5–5.3)
PROT SERPL-MCNC: 6 G/DL — SIGNIFICANT CHANGE UP (ref 6–8.3)
RBC # BLD: 3.01 M/UL — LOW (ref 4.2–5.8)
RBC # FLD: 13.7 % — SIGNIFICANT CHANGE UP (ref 10.3–14.5)
SODIUM SERPL-SCNC: 139 MMOL/L — SIGNIFICANT CHANGE UP (ref 135–145)
WBC # BLD: 4.27 K/UL — SIGNIFICANT CHANGE UP (ref 3.8–10.5)
WBC # FLD AUTO: 4.27 K/UL — SIGNIFICANT CHANGE UP (ref 3.8–10.5)

## 2020-12-02 PROCEDURE — 82330 ASSAY OF CALCIUM: CPT

## 2020-12-02 PROCEDURE — 84540 ASSAY OF URINE/UREA-N: CPT

## 2020-12-02 PROCEDURE — 85652 RBC SED RATE AUTOMATED: CPT

## 2020-12-02 PROCEDURE — 83605 ASSAY OF LACTIC ACID: CPT

## 2020-12-02 PROCEDURE — 83735 ASSAY OF MAGNESIUM: CPT

## 2020-12-02 PROCEDURE — 85014 HEMATOCRIT: CPT

## 2020-12-02 PROCEDURE — 97116 GAIT TRAINING THERAPY: CPT

## 2020-12-02 PROCEDURE — A9541: CPT

## 2020-12-02 PROCEDURE — 71045 X-RAY EXAM CHEST 1 VIEW: CPT

## 2020-12-02 PROCEDURE — 99232 SBSQ HOSP IP/OBS MODERATE 35: CPT

## 2020-12-02 PROCEDURE — 82435 ASSAY OF BLOOD CHLORIDE: CPT

## 2020-12-02 PROCEDURE — 99261: CPT

## 2020-12-02 PROCEDURE — 86901 BLOOD TYPING SEROLOGIC RH(D): CPT

## 2020-12-02 PROCEDURE — 78830 RP LOCLZJ TUM SPECT W/CT 1: CPT

## 2020-12-02 PROCEDURE — 97530 THERAPEUTIC ACTIVITIES: CPT

## 2020-12-02 PROCEDURE — 82947 ASSAY GLUCOSE BLOOD QUANT: CPT

## 2020-12-02 PROCEDURE — 87070 CULTURE OTHR SPECIMN AEROBIC: CPT

## 2020-12-02 PROCEDURE — 99232 SBSQ HOSP IP/OBS MODERATE 35: CPT | Mod: GC

## 2020-12-02 PROCEDURE — 87186 SC STD MICRODIL/AGAR DIL: CPT

## 2020-12-02 PROCEDURE — 84100 ASSAY OF PHOSPHORUS: CPT

## 2020-12-02 PROCEDURE — 73718 MRI LOWER EXTREMITY W/O DYE: CPT

## 2020-12-02 PROCEDURE — 84132 ASSAY OF SERUM POTASSIUM: CPT

## 2020-12-02 PROCEDURE — C1751: CPT

## 2020-12-02 PROCEDURE — 82570 ASSAY OF URINE CREATININE: CPT

## 2020-12-02 PROCEDURE — 80053 COMPREHEN METABOLIC PANEL: CPT

## 2020-12-02 PROCEDURE — 97161 PT EVAL LOW COMPLEX 20 MIN: CPT

## 2020-12-02 PROCEDURE — 87641 MR-STAPH DNA AMP PROBE: CPT

## 2020-12-02 PROCEDURE — 85018 HEMOGLOBIN: CPT

## 2020-12-02 PROCEDURE — 85025 COMPLETE CBC W/AUTO DIFF WBC: CPT

## 2020-12-02 PROCEDURE — 96374 THER/PROPH/DIAG INJ IV PUSH: CPT

## 2020-12-02 PROCEDURE — 78103 BONE MARROW IMAGING MULT: CPT

## 2020-12-02 PROCEDURE — 82803 BLOOD GASES ANY COMBINATION: CPT

## 2020-12-02 PROCEDURE — 87640 STAPH A DNA AMP PROBE: CPT

## 2020-12-02 PROCEDURE — 99285 EMERGENCY DEPT VISIT HI MDM: CPT | Mod: 25

## 2020-12-02 PROCEDURE — 36569 INSJ PICC 5 YR+ W/O IMAGING: CPT

## 2020-12-02 PROCEDURE — 84156 ASSAY OF PROTEIN URINE: CPT

## 2020-12-02 PROCEDURE — 87040 BLOOD CULTURE FOR BACTERIA: CPT

## 2020-12-02 PROCEDURE — 85610 PROTHROMBIN TIME: CPT

## 2020-12-02 PROCEDURE — A9570: CPT

## 2020-12-02 PROCEDURE — 71046 X-RAY EXAM CHEST 2 VIEWS: CPT

## 2020-12-02 PROCEDURE — 88304 TISSUE EXAM BY PATHOLOGIST: CPT

## 2020-12-02 PROCEDURE — U0003: CPT

## 2020-12-02 PROCEDURE — 78802 RP LOCLZJ TUM WHBDY 1 D IMG: CPT

## 2020-12-02 PROCEDURE — 83935 ASSAY OF URINE OSMOLALITY: CPT

## 2020-12-02 PROCEDURE — 81001 URINALYSIS AUTO W/SCOPE: CPT

## 2020-12-02 PROCEDURE — 85730 THROMBOPLASTIN TIME PARTIAL: CPT

## 2020-12-02 PROCEDURE — 84133 ASSAY OF URINE POTASSIUM: CPT

## 2020-12-02 PROCEDURE — 86140 C-REACTIVE PROTEIN: CPT

## 2020-12-02 PROCEDURE — 97110 THERAPEUTIC EXERCISES: CPT

## 2020-12-02 PROCEDURE — 93975 VASCULAR STUDY: CPT

## 2020-12-02 PROCEDURE — 97162 PT EVAL MOD COMPLEX 30 MIN: CPT

## 2020-12-02 PROCEDURE — 87635 SARS-COV-2 COVID-19 AMP PRB: CPT

## 2020-12-02 PROCEDURE — 73630 X-RAY EXAM OF FOOT: CPT

## 2020-12-02 PROCEDURE — 82962 GLUCOSE BLOOD TEST: CPT

## 2020-12-02 PROCEDURE — 84300 ASSAY OF URINE SODIUM: CPT

## 2020-12-02 PROCEDURE — 80169 DRUG ASSAY EVEROLIMUS: CPT

## 2020-12-02 PROCEDURE — P9047: CPT

## 2020-12-02 PROCEDURE — 87075 CULTR BACTERIA EXCEPT BLOOD: CPT

## 2020-12-02 PROCEDURE — 86850 RBC ANTIBODY SCREEN: CPT

## 2020-12-02 PROCEDURE — 86900 BLOOD TYPING SEROLOGIC ABO: CPT

## 2020-12-02 PROCEDURE — 83036 HEMOGLOBIN GLYCOSYLATED A1C: CPT

## 2020-12-02 PROCEDURE — 84295 ASSAY OF SERUM SODIUM: CPT

## 2020-12-02 RX ORDER — EVEROLIMUS 10 MG/1
2 TABLET ORAL
Qty: 0 | Refills: 0 | DISCHARGE
Start: 2020-12-02

## 2020-12-02 RX ORDER — CHLORHEXIDINE GLUCONATE 213 G/1000ML
1 SOLUTION TOPICAL DAILY
Refills: 0 | Status: DISCONTINUED | OUTPATIENT
Start: 2020-12-02 | End: 2020-12-02

## 2020-12-02 RX ORDER — TAMSULOSIN HYDROCHLORIDE 0.4 MG/1
1 CAPSULE ORAL
Qty: 0 | Refills: 0 | DISCHARGE
Start: 2020-12-02

## 2020-12-02 RX ORDER — INSULIN LISPRO 100/ML
6 VIAL (ML) SUBCUTANEOUS
Qty: 0 | Refills: 0 | DISCHARGE

## 2020-12-02 RX ORDER — PANTOPRAZOLE SODIUM 20 MG/1
1 TABLET, DELAYED RELEASE ORAL
Qty: 0 | Refills: 0 | DISCHARGE
Start: 2020-12-02

## 2020-12-02 RX ORDER — VALGANCICLOVIR 450 MG/1
1 TABLET, FILM COATED ORAL
Qty: 0 | Refills: 0 | DISCHARGE
Start: 2020-12-02

## 2020-12-02 RX ORDER — MAGNESIUM OXIDE 400 MG ORAL TABLET 241.3 MG
2 TABLET ORAL
Qty: 0 | Refills: 0 | DISCHARGE
Start: 2020-12-02

## 2020-12-02 RX ORDER — EVEROLIMUS 10 MG/1
2 TABLET ORAL
Qty: 0 | Refills: 0 | DISCHARGE

## 2020-12-02 RX ORDER — INSULIN GLARGINE 100 [IU]/ML
10 INJECTION, SOLUTION SUBCUTANEOUS
Qty: 0 | Refills: 0 | DISCHARGE
Start: 2020-12-02

## 2020-12-02 RX ORDER — FUROSEMIDE 40 MG
1 TABLET ORAL
Qty: 0 | Refills: 0 | DISCHARGE
Start: 2020-12-02

## 2020-12-02 RX ORDER — FUROSEMIDE 40 MG
1 TABLET ORAL
Qty: 0 | Refills: 0 | DISCHARGE

## 2020-12-02 RX ORDER — ASPIRIN/CALCIUM CARB/MAGNESIUM 324 MG
1 TABLET ORAL
Qty: 0 | Refills: 0 | DISCHARGE
Start: 2020-12-02

## 2020-12-02 RX ORDER — MAGNESIUM OXIDE 400 MG ORAL TABLET 241.3 MG
1 TABLET ORAL
Qty: 0 | Refills: 0 | DISCHARGE
Start: 2020-12-02

## 2020-12-02 RX ADMIN — PANTOPRAZOLE SODIUM 40 MILLIGRAM(S): 20 TABLET, DELAYED RELEASE ORAL at 05:37

## 2020-12-02 RX ADMIN — MAGNESIUM OXIDE 400 MG ORAL TABLET 800 MILLIGRAM(S): 241.3 TABLET ORAL at 08:54

## 2020-12-02 RX ADMIN — Medication 12 UNIT(S): at 12:32

## 2020-12-02 RX ADMIN — Medication 81 MILLIGRAM(S): at 12:32

## 2020-12-02 RX ADMIN — Medication 5 MILLIGRAM(S): at 05:36

## 2020-12-02 RX ADMIN — Medication 4: at 08:49

## 2020-12-02 RX ADMIN — EVEROLIMUS 2 MILLIGRAM(S): 10 TABLET ORAL at 08:54

## 2020-12-02 RX ADMIN — Medication 12 UNIT(S): at 08:55

## 2020-12-02 RX ADMIN — CEFEPIME 100 MILLIGRAM(S): 1 INJECTION, POWDER, FOR SOLUTION INTRAMUSCULAR; INTRAVENOUS at 12:33

## 2020-12-02 RX ADMIN — VALGANCICLOVIR 450 MILLIGRAM(S): 450 TABLET, FILM COATED ORAL at 12:33

## 2020-12-02 RX ADMIN — Medication 2: at 12:32

## 2020-12-02 RX ADMIN — Medication 20 MILLIGRAM(S): at 05:36

## 2020-12-02 RX ADMIN — Medication 1 TABLET(S): at 12:33

## 2020-12-02 RX ADMIN — CEFEPIME 100 MILLIGRAM(S): 1 INJECTION, POWDER, FOR SOLUTION INTRAMUSCULAR; INTRAVENOUS at 05:37

## 2020-12-02 NOTE — PROGRESS NOTE ADULT - ASSESSMENT
s/p liver transplant  osteomyelitis     cont regular diet  IV antibiotics per ID  rejection regimen per transplant service  oob as tolerated  bone biopsy path c/w OM, podiatry follow-up noted    dc planning per primary team

## 2020-12-02 NOTE — PROGRESS NOTE ADULT - SUBJECTIVE AND OBJECTIVE BOX
INTERVAL HPI/OVERNIGHT EVENTS:    patient seen and examined  no acute overnight events  dc planning per primary team       MEDICATIONS  (STANDING):  aspirin enteric coated 81 milliGRAM(s) Oral daily  cefepime   IVPB 2000 milliGRAM(s) IV Intermittent every 12 hours  collagenase Ointment 1 Application(s) Topical daily  Dakins Solution - 1/2 Strength 1 Application(s) Topical daily  dextrose 40% Gel 15 Gram(s) Oral once  dextrose 5%. 1000 milliLiter(s) (50 mL/Hr) IV Continuous <Continuous>  dextrose 5%. 1000 milliLiter(s) (100 mL/Hr) IV Continuous <Continuous>  dextrose 5%. 1000 milliLiter(s) (100 mL/Hr) IV Continuous <Continuous>  dextrose 50% Injectable 25 Gram(s) IV Push once  dextrose 50% Injectable 12.5 Gram(s) IV Push once  dextrose 50% Injectable 25 Gram(s) IV Push once  everolimus (ZORTRESS) 2 milliGRAM(s) Oral <User Schedule>  furosemide   Injectable 20 milliGRAM(s) IV Push daily  glucagon  Injectable 1 milliGRAM(s) IntraMuscular once  insulin glargine Injectable (LANTUS) 8 Unit(s) SubCutaneous at bedtime  insulin lispro (ADMELOG) corrective regimen sliding scale   SubCutaneous every 6 hours  insulin lispro Injectable (ADMELOG) 10 Unit(s) SubCutaneous three times a day before meals  magnesium oxide 800 milliGRAM(s) Oral with breakfast  pantoprazole    Tablet 40 milliGRAM(s) Oral before breakfast  predniSONE   Tablet 5 milliGRAM(s) Oral daily  tamsulosin 0.4 milliGRAM(s) Oral at bedtime  trimethoprim   80 mG/sulfamethoxazole 400 mG 1 Tablet(s) Oral daily  valGANciclovir 450 milliGRAM(s) Oral daily  vancomycin  IVPB 1250 milliGRAM(s) IV Intermittent daily    MEDICATIONS  (PRN):  acetaminophen   Tablet .. 650 milliGRAM(s) Oral every 6 hours PRN Temp greater or equal to 38C (100.4F), Mild Pain (1 - 3)      Allergies    codeine (Anaphylaxis)    Intolerances        Review of Systems:    General:  No wt loss, fevers, chills, night sweats,fatigue,   Eyes:  Good vision, no reported pain  ENT:  No sore throat, pain, runny nose, dysphagia  CV:  No pain, palpitatioins, hypo/hypertension  Resp:  No dyspnea, cough, tachypnea, wheezing  GI:  No pain, No nausea, No vomiting, No diarrhea, No constipatiion, No weight loss, No fever, No pruritis, No rectal bleeding, No tarry stools, No dysphagia,  :  No pain, bleeding, incontinence, nocturia  Muscle:  No pain, weakness  Neuro:  No weakness, tingling, memory problems  Psych:  No fatigue, insomnia, mood problems, depression  Endocrine:  No polyuria, polydypsia, cold/heat intolerance  Heme:  No petechiae, ecchymosis, easy bruisability  Skin:  No rash, tattoos, scars, edema      Vital Signs Last 24 Hrs  T(C): 37 (24 Nov 2020 06:31), Max: 37 (24 Nov 2020 01:00)  T(F): 98.6 (24 Nov 2020 06:31), Max: 98.6 (24 Nov 2020 01:00)  HR: 75 (24 Nov 2020 06:31) (75 - 87)  BP: 119/63 (24 Nov 2020 06:31) (119/63 - 150/68)  BP(mean): --  RR: 18 (24 Nov 2020 06:31) (18 - 18)  SpO2: 98% (24 Nov 2020 06:31) (98% - 100%)    PHYSICAL EXAM:    Constitutional: NAD  HEENT: EOMI, throat clear  Neck: No LAD, supple  Gastrointestinal: BS+, obese, soft, NT/ND  Extremities: + edema, R foot wrap   Neurological: A/O x 3, no focal deficits        LABS:                        8.9    2.16  )-----------( 160      ( 24 Nov 2020 06:15 )             27.7     11-23    140  |  106  |  25<H>  ----------------------------<  145<H>  4.4   |  25  |  1.44<H>    Ca    8.9      23 Nov 2020 05:58  Phos  3.2     11-24  Mg     2.0     11-24    TPro  5.8<L>  /  Alb  3.2<L>  /  TBili  0.1<L>  /  DBili  x   /  AST  15  /  ALT  15  /  AlkPhos  90  11-23    PT/INR - ( 24 Nov 2020 06:15 )   PT: 11.6 sec;   INR: 0.97 ratio         PTT - ( 24 Nov 2020 06:15 )  PTT:30.1 sec      RADIOLOGY & ADDITIONAL TESTS:

## 2020-12-02 NOTE — PROGRESS NOTE ADULT - REASON FOR ADMISSION
R heel wound

## 2020-12-02 NOTE — DISCHARGE NOTE NURSING/CASE MANAGEMENT/SOCIAL WORK - PATIENT PORTAL LINK FT
You can access the FollowMyHealth Patient Portal offered by [facility] by registering at the following website: [email or web address]. By joining [employer]’s FollowMyHealth portal, you will also be able to view your health information using other applications (apps) compatible with our system.

## 2020-12-02 NOTE — PROGRESS NOTE ADULT - SUBJECTIVE AND OBJECTIVE BOX
Follow Up:  Calcaneus OM    Interval History:    REVIEW OF SYSTEMS  [  ] ROS unobtainable because:    [  ] All other systems negative except as noted below    Constitutional:  [ ] fever [ ] chills  [ ] weight loss  [ ] weakness  Skin:  [ ] rash [ ] phlebitis	  Eyes: [ ] icterus [ ] pain  [ ] discharge	  ENMT: [ ] sore throat  [ ] thrush [ ] ulcers [ ] exudates  Respiratory: [ ] dyspnea [ ] hemoptysis [ ] cough [ ] sputum	  Cardiovascular:  [ ] chest pain [ ] palpitations [ ] edema	  Gastrointestinal:  [ ] nausea [ ] vomiting [ ] diarrhea [ ] constipation [ ] pain	  Genitourinary:  [ ] dysuria [ ] frequency [ ] hematuria [ ] discharge [ ] flank pain  [ ] incontinence  Musculoskeletal:  [ ] myalgias [ ] arthralgias [ ] arthritis  [ ] back pain  Neurological:  [ ] headache [ ] seizures  [ ] confusion/altered mental status    Allergies  codeine (Anaphylaxis)        ANTIMICROBIALS:  cefepime   IVPB 2000 every 8 hours  trimethoprim   80 mG/sulfamethoxazole 400 mG 1 daily  valGANciclovir 450 daily      OTHER MEDS:  MEDICATIONS  (STANDING):  acetaminophen   Tablet .. 650 every 6 hours PRN  aspirin enteric coated 81 daily  dextrose 40% Gel 15 once  dextrose 50% Injectable 12.5 once  dextrose 50% Injectable 25 once  dextrose 50% Injectable 25 once  everolimus (ZORTRESS) 2 <User Schedule>  furosemide    Tablet 20 daily  glucagon  Injectable 1 once  insulin glargine Injectable (LANTUS) 10 at bedtime  insulin lispro (ADMELOG) corrective regimen sliding scale  three times a day before meals  insulin lispro (ADMELOG) corrective regimen sliding scale  at bedtime  insulin lispro Injectable (ADMELOG) 12 three times a day before meals  pantoprazole    Tablet 40 before breakfast  predniSONE   Tablet 5 daily  tamsulosin 0.4 at bedtime      Vital Signs Last 24 Hrs  T(C): 36.5 (02 Dec 2020 12:30), Max: 36.6 (02 Dec 2020 05:00)  T(F): 97.7 (02 Dec 2020 12:30), Max: 97.9 (02 Dec 2020 05:00)  HR: 82 (02 Dec 2020 12:30) (76 - 89)  BP: 113/67 (02 Dec 2020 12:30) (111/67 - 139/79)  BP(mean): --  RR: 20 (02 Dec 2020 12:30) (18 - 20)  SpO2: 98% (02 Dec 2020 12:30) (95% - 98%)    PHYSICAL EXAMINATION:  General: Alert and Awake, NAD  HEENT: PERRL, EOMI  Neck: Supple  Cardiac: RRR, No M/R/G  Resp: CTAB, No Wh/Rh/Ra  Abdomen: NBS, NT/ND, No HSM, No rigidity or guarding  MSK: No LE edema. No Calf tenderness  : No bbo  Skin: No rashes or lesions. Skin is warm and dry to the touch.   Neuro: Alert and Awake. CN 2-12 Grossly intact. Moves all four extremities spontaneously.  Psych: Calm, Pleasant, Cooperative                          8.9    4.27  )-----------( 155      ( 02 Dec 2020 06:29 )             27.7       12-02    139  |  105  |  50<H>  ----------------------------<  232<H>  5.0   |  22  |  1.57<H>    Ca    9.4      02 Dec 2020 06:29  Phos  3.4     12-02  Mg     2.1     12-02    TPro  6.0  /  Alb  3.7  /  TBili  0.2  /  DBili  x   /  AST  18  /  ALT  26  /  AlkPhos  126<H>  12-02          MICROBIOLOGY:  v  .Tissue Other  11-25-20   Growth in fluid media only Staphylococcus epidermidis  "Susceptibilities not performed"  --  Staphylococcus epidermidis      Skin wound  11-21-20   Few Pseudomonas aeruginosa  Normal skin ninfa isolated  --  Pseudomonas aeruginosa      .Blood Blood-Peripheral  11-20-20   No Growth Final  --  --        CMV IgG Antibody: <0.20 U/mL (12-04-19 @ 08:33)  Toxoplasma IgG Screen: <3.0 IU/mL (12-04-19 @ 08:33)          RADIOLOGY:    <The imaging below has been reviewed and visualized by me independently. Findings as detailed in report below> Follow Up:  Calcaneus OM    Interval History: afebrile. no pain in RLE.     REVIEW OF SYSTEMS  [  ] ROS unobtainable because:    [ x ] All other systems negative except as noted below    Constitutional:  [ ] fever [ ] chills  [ ] weight loss  [ ] weakness  Skin:  [ ] rash [ ] phlebitis	  Eyes: [ ] icterus [ ] pain  [ ] discharge	  ENMT: [ ] sore throat  [ ] thrush [ ] ulcers [ ] exudates  Respiratory: [ ] dyspnea [ ] hemoptysis [ ] cough [ ] sputum	  Cardiovascular:  [ ] chest pain [ ] palpitations [ ] edema	  Gastrointestinal:  [ ] nausea [ ] vomiting [ ] diarrhea [ ] constipation [ ] pain	  Genitourinary:  [ ] dysuria [ ] frequency [ ] hematuria [ ] discharge [ ] flank pain  [ ] incontinence  Musculoskeletal:  [ ] myalgias [ ] arthralgias [ ] arthritis  [ ] back pain  Neurological:  [ ] headache [ ] seizures  [ ] confusion/altered mental status    Allergies  codeine (Anaphylaxis)        ANTIMICROBIALS:  cefepime   IVPB 2000 every 8 hours  trimethoprim   80 mG/sulfamethoxazole 400 mG 1 daily  valGANciclovir 450 daily      OTHER MEDS:  MEDICATIONS  (STANDING):  acetaminophen   Tablet .. 650 every 6 hours PRN  aspirin enteric coated 81 daily  dextrose 40% Gel 15 once  dextrose 50% Injectable 12.5 once  dextrose 50% Injectable 25 once  dextrose 50% Injectable 25 once  everolimus (ZORTRESS) 2 <User Schedule>  furosemide    Tablet 20 daily  glucagon  Injectable 1 once  insulin glargine Injectable (LANTUS) 10 at bedtime  insulin lispro (ADMELOG) corrective regimen sliding scale  three times a day before meals  insulin lispro (ADMELOG) corrective regimen sliding scale  at bedtime  insulin lispro Injectable (ADMELOG) 12 three times a day before meals  pantoprazole    Tablet 40 before breakfast  predniSONE   Tablet 5 daily  tamsulosin 0.4 at bedtime      Vital Signs Last 24 Hrs  T(C): 36.5 (02 Dec 2020 12:30), Max: 36.6 (02 Dec 2020 05:00)  T(F): 97.7 (02 Dec 2020 12:30), Max: 97.9 (02 Dec 2020 05:00)  HR: 82 (02 Dec 2020 12:30) (76 - 89)  BP: 113/67 (02 Dec 2020 12:30) (111/67 - 139/79)  BP(mean): --  RR: 20 (02 Dec 2020 12:30) (18 - 20)  SpO2: 98% (02 Dec 2020 12:30) (95% - 98%)    PHYSICAL EXAMINATION:  General: Alert and Awake, NAD  HEENT: PERRL, EOMI  Neck: Supple  Cardiac: RRR, No M/R/G  Resp: CTAB, No Wh/Rh/Ra  Abdomen: NBS, NT/ND, No HSM, No rigidity or guarding  MSK: +dressing over right foot. No LE edema. No Calf tenderness  : No bob  Skin: No rashes or lesions. Skin is warm and dry to the touch.   Neuro: Alert and Awake. CN 2-12 Grossly intact. Moves all four extremities spontaneously.  Psych: Calm, Pleasant, Cooperative                          8.9    4.27  )-----------( 155      ( 02 Dec 2020 06:29 )             27.7       12-02    139  |  105  |  50<H>  ----------------------------<  232<H>  5.0   |  22  |  1.57<H>    Ca    9.4      02 Dec 2020 06:29  Phos  3.4     12-02  Mg     2.1     12-02    TPro  6.0  /  Alb  3.7  /  TBili  0.2  /  DBili  x   /  AST  18  /  ALT  26  /  AlkPhos  126<H>  12-02          MICROBIOLOGY:  v  .Tissue Other  11-25-20   Growth in fluid media only Staphylococcus epidermidis  "Susceptibilities not performed"  --  Staphylococcus epidermidis      Skin wound  11-21-20   Few Pseudomonas aeruginosa  Normal skin ninfa isolated  --  Pseudomonas aeruginosa      .Blood Blood-Peripheral  11-20-20   No Growth Final  --  --        CMV IgG Antibody: <0.20 U/mL (12-04-19 @ 08:33)  Toxoplasma IgG Screen: <3.0 IU/mL (12-04-19 @ 08:33)          RADIOLOGY:    <The imaging below has been reviewed and visualized by me independently. Findings as detailed in report below>    EXAM:  US DPLX KIDNEY(IES)                        PROCEDURE DATE:  11/27/2020    No hydronephrosis.  No evidence of a hemodynamically significant left renal artery stenosis.  Nonvisualization of the right renal artery origin. No distal tardus parvus waveforms to suggest a hemodynamically significant stenosis.  Patent renal veins.    EXAM:  NM INFLAMM LOC WBC WB SD                        EXAM:  NM MULTI DAY PROCEDURE                        EXAM:  NM BONE MARROW IMG MULT AREAS                        EXAM:  NM INFLAMM LOC SPECT CT SA SD                        PROCEDURE DATE:  11/24/2020    Combined Indium-111 labeled leukocyte study and marrow scan demonstrates:  No scan evidence of osteomyelitis in the right heel.  Findings raise the possibility of neuropathic joint in the right midfoot.

## 2020-12-02 NOTE — PROGRESS NOTE ADULT - ASSESSMENT
64 year old male with past medical history of cirrhosis s/p OLT (7/2/20 at [facility]), IDDM, MGUS who presented to [facility] on 11/20 with worsening drainage at his right heel with concern for osteomyelitis.    Superficial Cultures here with pseudomonas   MRSA Nasal PCR negative  s/p right foot heel incision and drainage w/ application of stravix and bone biopsy on 11/24  Per Podiatry low concern for OM    Wound cultures thus far with Staph epi but only growing in liquid broth (likely contaminant)  Pathology from bone biopsy with Osteomyelitis    Overall, Calcaneus OM, Positive Culture Finding, Liver Transplant Recipient, Prophylactic Antibiotic    --s/p PICC line  --Plan for 6 weeks of Cefepime 2g IV Q8H as patient's Cr Cl is now 60-70  --Recommend CBC with Diff and CMP qWeekly while on antimicrobials. Please have results faxed to [phone]  --Patient should follow up with me in the Infectious Diseases Office at [address] [place] [address] in 3-4 weeks time. Office contact number is [phone]  --Continue Bactrim SS PO QD for PCP PPx  --Continue Valcyte for CMV PPx (D+/R-)    I will sign off at this time. Please feel free to contact me with any further questions or concerns.    Eusebio Pérez M.D.  [facility] Division of Infectious Disease  8AM-5PM: Pager Number [phone]  After Hours (or if no response): Please contact the Infectious Diseases Office at [phone]     The above assessment and plan were discussed with Transplant Surgery Team

## 2020-12-02 NOTE — DISCHARGE NOTE NURSING/CASE MANAGEMENT/SOCIAL WORK - NSDCFUADDAPPT_GEN_ALL_CORE_FT
FOLLOW-UP:  1.  Please follow-up with Dr. Chris early next week  2.  Please [facility] up with [facility] Wang [facility] within 1 week of discharge from the hospital, please call [phone] for appointment and discuss that you recently were seen in the hospital.  3.  Please follow-up with Dr. Pérez, Infectious Disease in 4 weeks: [phone]  4.  Please follow-up with your Endocrinologist as scheduled in January 2021

## 2020-12-02 NOTE — PROGRESS NOTE ADULT - SUBJECTIVE AND OBJECTIVE BOX
Chief Complaint:  Patient is a 64y old  Male who presents with a chief complaint of R heel wound (01 Dec 2020 17:12)      Interval Events:     Course of antibiotic treatment finalized as cefepime 2 g IV q8h for 6 weeks by Transplant ID.  KYRIE PICC placement confirmed.  Pending discharge today.    Allergies:  codeine (Anaphylaxis)      Hospital Medications:  acetaminophen   Tablet .. 650 milliGRAM(s) Oral every 6 hours PRN  aspirin enteric coated 81 milliGRAM(s) Oral daily  cefepime   IVPB 2000 milliGRAM(s) IV Intermittent every 8 hours  chlorhexidine 2% Cloths 1 Application(s) Topical daily  collagenase Ointment 1 Application(s) Topical daily  dextrose 40% Gel 15 Gram(s) Oral once  dextrose 5%. 1000 milliLiter(s) IV Continuous <Continuous>  dextrose 5%. 1000 milliLiter(s) IV Continuous <Continuous>  dextrose 5%. 1000 milliLiter(s) IV Continuous <Continuous>  dextrose 50% Injectable 25 Gram(s) IV Push once  dextrose 50% Injectable 25 Gram(s) IV Push once  dextrose 50% Injectable 12.5 Gram(s) IV Push once  everolimus (ZORTRESS) 2 milliGRAM(s) Oral <User Schedule>  furosemide    Tablet 20 milliGRAM(s) Oral daily  glucagon  Injectable 1 milliGRAM(s) IntraMuscular once  insulin glargine Injectable (LANTUS) 10 Unit(s) SubCutaneous at bedtime  insulin lispro (ADMELOG) corrective regimen sliding scale   SubCutaneous three times a day before meals  insulin lispro (ADMELOG) corrective regimen sliding scale   SubCutaneous at bedtime  insulin lispro Injectable (ADMELOG) 12 Unit(s) SubCutaneous three times a day before meals  magnesium oxide 800 milliGRAM(s) Oral with breakfast  pantoprazole    Tablet 40 milliGRAM(s) Oral before breakfast  predniSONE   Tablet 5 milliGRAM(s) Oral daily  tamsulosin 0.4 milliGRAM(s) Oral at bedtime  trimethoprim   80 mG/sulfamethoxazole 400 mG 1 Tablet(s) Oral daily  valGANciclovir 450 milliGRAM(s) Oral daily      PMHX/PSHX:  GIB (gastrointestinal bleeding)    GERD with esophagitis    Hepatic encephalopathy    Obesity    Fatty liver disease, nonalcoholic    Renal stones    Hypertension    Neuropathy    Hypercholesteremia    Diabetes    S/P cholecystectomy    No significant past surgical history        Family history:  Family history of type 2 diabetes mellitus    Family history of hypertension    Family history of stomach cancer    No pertinent family history in first degree relatives        ROS:     General:  No weight loss, fevers, chills, night sweats, fatigue   Eyes:  No vision changes  ENT:  No sore throat, pain, runny nose  CV:  No chest pain, palpitations, dizziness   Resp:  No SOB, cough, wheezing  GI:  See HPI  :  No burning with urination, hematuria  Muscle:  No pain, weakness  Neuro:  No weakness/tingling, memory problems  Psych:  No fatigue, insomnia, mood problems, depression  Heme:  No easy bruisability  Skin:  No rash, edema      PHYSICAL EXAM:     GENERAL:  No distress, resting comfortably in bed  HEENT:  Conjunctivae clear, sclera anicteric  CHEST:  No increased effort w/ respirations  HEART:  Regular rhythm & rate  ABDOMEN:  Soft, non-tender, non-distended  EXTREMITIES: +1 edema in RLE +R. foot covered in ACE wrap  SKIN:  No rash/erythema/ecchymoses/petechiae/jaundice   NEURO:  Alert, orientedx3    Vital Signs:  Vital Signs Last 24 Hrs  T(C): 36.4 (02 Dec 2020 08:30), Max: 36.7 (01 Dec 2020 17:00)  T(F): 97.5 (02 Dec 2020 08:30), Max: 98 (01 Dec 2020 17:00)  HR: 83 (02 Dec 2020 08:30) (76 - 89)  BP: 111/67 (02 Dec 2020 08:30) (111/67 - 143/77)  BP(mean): --  RR: 18 (02 Dec 2020 08:30) (18 - 20)  SpO2: 95% (02 Dec 2020 08:30) (95% - 98%)  Daily     Daily     LABS:                        8.9    4.27  )-----------( 155      ( 02 Dec 2020 06:29 )             27.7     12-02    139  |  105  |  50<H>  ----------------------------<  232<H>  5.0   |  22  |  1.57<H>    Ca    9.4      02 Dec 2020 06:29  Phos  3.4     12-02  Mg     2.1     12-02    TPro  6.0  /  Alb  3.7  /  TBili  0.2  /  DBili  x   /  AST  18  /  ALT  26  /  AlkPhos  126<H>  12-02    LIVER FUNCTIONS - ( 02 Dec 2020 06:29 )  Alb: 3.7 g/dL / Pro: 6.0 g/dL / ALK PHOS: 126 U/L / ALT: 26 U/L / AST: 18 U/L / GGT: x               Imaging:

## 2020-12-02 NOTE — PROGRESS NOTE ADULT - ASSESSMENT
64 year old man type 2 diabetes, HLD, GERD, HFpEF with mild LV diastolic dysfunction, and decompensated JEAN cirrhosis, duodenal ulcer, GAVE and duodenal AVM s/p APC (last on 10/11/19) and SHENG who is now s/p liver transplant (7/2/20) with post-op course c/b tacrolimus toxicity and hospital delirium improving after stopping CNI and replacing w/ everolimus. Patient now presents 11/20/20 with worsening LE swelling and right foot wound with wound culture 11/24 growing S. epidermidis and calcaneus bone biopsy culture 11/24 demonstrating osteomyelitis.      # Right foot osteomyelitis:  s/p surgical debridement and graft placement on 11/24 by Podiatry. Wound culture 11/24 growing S. epidermidis - likely contaminant; calcaneus bone biopsy culture 11/24 obtained intraoperatively demonstrates osteomyelitis.  Suspect poor wound healing in setting of uncontrolled type 2 diabetes and immunosuppression. PICC line placed 12/1 for 6 week course of cefepime as per Transplant ID.    # MISA, mild - Resolved. Serum Cr now within baseline range (1.3-1.4),. Suspect pre-renal etiology vs. less likely ATN.     # S/p liver transplant 7/2/20: Transaminases and markers of synthetic function are normal with minimal immunosuppression.  Recipient Info:   ABO: A  CMV:  Neg  EBV Positive  Donor:  Donor ID:  [id number] Match: [id number]  Age: 29 ABO:  A1 High Risk:   No COD: Cardiovascular  Anti CMV positive, EBV IgG- positive  HepBcAb-neg, Hepatitis C-DANA- neg, Hepatitis C ab-neg    # Sacral decubitus ulcer: Stage II without signs of infection at this time  # Type 2 diabetes:  A1c 8.1% and blood sugars elevated above goal -likely contributing to poor wound healing   # SHENG     Recommendations:  - cefepime 2g IV q8 for 6 week course for osteomyelitis of R. calcaneus as per Transplant ID  - Bactrim SS PO daily and valganciclovir 450mg daily for prophylaxis as per Transplant Surgery  - everolimus 2mg q12 as per Transplant Surgery  - remain off MMF  - continue prednisone 5mg qd  - continue Lasix 20mg PO daily   - plan for discharge to home today as per Transplant Surgery      Vivian Guardado PGY-5  Gastroenterology Fellow  Pager #[phone]  E-mail [email or web address]  Call [phone] to speak to answering service for on-call GI fellow 5pm-7am and weekends.

## 2020-12-02 NOTE — PROGRESS NOTE ADULT - ASSESSMENT
64M with IDDM, HLD, obesity, HFpEF with mild LV diastolic dysfunction, MGUS, chronic anemia with a history of duodenal ulcer 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 Known history of non healing R heel ulcer. Followed by podiatry outpatient Dr. Patel, had recent debridement wound (10/20) cx grew ec faecalis, pseudomonas, enterococcus. Tx with Cipro without improvement. Admitted with non healing R foot ulcer    [] R heel ulcer  - MRI c/w early OM, Tagged WBC scan negative.  - S/p OR for right heal debridement 11/24  - Wound culture on admission grew pseudomonas (resistant to cipro), wound cx from OR grew Staph epi; Bone biopsy Path aggregate of gray-tan, necrotic, soft tissue fragments  - Appreciate Podiatry's recc's  - ID following: Cefepime 2g q8hrs x 6weeks    [] MISA  - stable renal function   - SCr 1.4 from 1.6; good u/o   - Continue PO Lasix  - Strict I/Os    [] s/p OLT with good graft function   - Immuno: Everolimus 2/2, MMF on hold in setting of infection, Pred 5  - PPx: bactrim/valcyte (currently on 450mg QD decreased from 900 QD 2/2 low WBC)  - Daily CBC, BMP  - Lasix 20mg PO daily     [] DM  - Fingersticks ac and qhs  - Continue glargine 10u qhs and  lispro 12u with meals  - SSI coverage    [] Dispo   - dc today

## 2020-12-02 NOTE — PROGRESS NOTE ADULT - SUBJECTIVE AND OBJECTIVE BOX
Transplant Surgery Multidisciplinary Progress Note    OLT 7/2/2020    Present: Patient seen and examined with multidisciplinary team including Transplant Surgeon: Dr. Alonzo, Dr. Gray, Transplant Hepatologist: Dr. Mo, Transplant Nephrologist: Dr. YNES Patel, PGY 3 Dr. Leos, Transplant Pharmacist, Ancelmo Blanca, ACP's: Otilia/Gualberto and unit RN during am rounds.  Disciplines not in attendance will be notified of the plan.     HPI: 64M with IDDM, HLD, obesity, HFpEF with mild LV diastolic dysfunction, MGUS, chronic anemia with a history of duodenal ulcer 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  Known history of non healing R heel ulcer. Followed by podiatry outpatient Dr. Patel, had recent debridement wound (10/20) cx grew ec faecalis, pseudomonas, enterococcus. Tx with cipro.  No improvement post debridement and PO antibiotic treatment.    Admitted with non healing R heel ulcer.   ·	R wound cx on admission grew Pseudomonas resistant to cipro; started on meropenem 11/20-11/23, vanco 11/23-11/25, Cefepime 11/23  ·	MRI c/w early OM. Podiatry and ID consulted. Broadened to meropenem. MMF held on admission, Everolimus reduced  ·	WBC scan -neg for OM        OR (11/24)-> wound debridement (cxs grew staph epi), bone bx path report  aggregate of gray-tan, necrotic, soft tissue fragments  ·	MISA: renal us no hydro; patent vessels  ·	Neutropenia: valcyte dose reduced 450mg daily (was on 900mg daily CMV +/-), MMF held in setting of infection  ·	PICC line placed      Interval Events:   - afebrile, stable VSS  - no overnight events  - PICC line placed yesterday; outpatient anbx for discharge is set up.     Potential Discharge date: today    Education:  Medications    Plan of care:  See Below    MEDICATIONS  (STANDING):  aspirin enteric coated 81 milliGRAM(s) Oral daily  cefepime   IVPB 2000 milliGRAM(s) IV Intermittent every 8 hours  chlorhexidine 2% Cloths 1 Application(s) Topical daily  collagenase Ointment 1 Application(s) Topical daily  dextrose 40% Gel 15 Gram(s) Oral once  dextrose 5%. 1000 milliLiter(s) (50 mL/Hr) IV Continuous <Continuous>  dextrose 5%. 1000 milliLiter(s) (100 mL/Hr) IV Continuous <Continuous>  dextrose 5%. 1000 milliLiter(s) (100 mL/Hr) IV Continuous <Continuous>  dextrose 50% Injectable 25 Gram(s) IV Push once  dextrose 50% Injectable 25 Gram(s) IV Push once  dextrose 50% Injectable 12.5 Gram(s) IV Push once  everolimus (ZORTRESS) 2 milliGRAM(s) Oral <User Schedule>  furosemide    Tablet 20 milliGRAM(s) Oral daily  glucagon  Injectable 1 milliGRAM(s) IntraMuscular once  insulin glargine Injectable (LANTUS) 10 Unit(s) SubCutaneous at bedtime  insulin lispro (ADMELOG) corrective regimen sliding scale   SubCutaneous three times a day before meals  insulin lispro (ADMELOG) corrective regimen sliding scale   SubCutaneous at bedtime  insulin lispro Injectable (ADMELOG) 12 Unit(s) SubCutaneous three times a day before meals  magnesium oxide 800 milliGRAM(s) Oral with breakfast  pantoprazole    Tablet 40 milliGRAM(s) Oral before breakfast  predniSONE   Tablet 5 milliGRAM(s) Oral daily  tamsulosin 0.4 milliGRAM(s) Oral at bedtime  trimethoprim   80 mG/sulfamethoxazole 400 mG 1 Tablet(s) Oral daily  valGANciclovir 450 milliGRAM(s) Oral daily    MEDICATIONS  (PRN):  acetaminophen   Tablet .. 650 milliGRAM(s) Oral every 6 hours PRN Moderate Pain (4 - 6)    PAST MEDICAL & SURGICAL HISTORY:  GIB (gastrointestinal bleeding)  GERD with esophagitis  Gastritis &amp; Non Bleeding Ulcers  Hepatic encephalopathy  Obesity  Fatty liver disease, nonalcoholic  Renal stones 25 years ago  Hypertension  Neuropathy  Hypercholesteremia  Diabetes  S/P cholecystectomy    Vital Signs Last 24 Hrs  T(C): 36.4 (02 Dec 2020 08:30), Max: 36.7 (01 Dec 2020 17:00)  T(F): 97.5 (02 Dec 2020 08:30), Max: 98 (01 Dec 2020 17:00)  HR: 83 (02 Dec 2020 08:30) (76 - 89)  BP: 111/67 (02 Dec 2020 08:30) (111/67 - 143/77)  BP(mean): --  RR: 18 (02 Dec 2020 08:30) (18 - 20)  SpO2: 95% (02 Dec 2020 08:30) (95% - 98%)    I&O's Summary    01 Dec 2020 07:01  -  02 Dec 2020 07:00  --------------------------------------------------------  IN: 1020 mL / OUT: 2170 mL / NET: -1150 mL                         8.9    4.27  )-----------( 155      ( 02 Dec 2020 06:29 )             27.7     12-02    139  |  105  |  50<H>  ----------------------------<  232<H>  5.0   |  22  |  1.57<H>    Ca    9.4      02 Dec 2020 06:29  Phos  3.4     12-02  Mg     2.1     12-02    TPro  6.0  /  Alb  3.7  /  TBili  0.2  /  DBili  x   /  AST  18  /  ALT  26  /  AlkPhos  126<H>  12-02    Review of systems  Gen: No weight changes, fatigue, fevers/chills, weakness  Skin: R heel wound  Head/Eyes/Ears/Mouth: No headache; Normal hearing; Normal vision w/o blurriness; No sinus pain/discomfort, sore throat  Respiratory: No dyspnea, cough, wheezing, hemoptysis  CV: No chest pain, PND, orthopnea  GI: no abdominal pain  : No increased frequency, dysuria, hematuria, nocturia  MSK: No joint pain/swelling; no back pain; + LE edema  Neuro: No dizziness/lightheadedness, weakness, seizures, numbness, tingling  Heme: No easy bruising or bleeding  Endo: No heat/cold intolerance  Psych: No significant nervousness, anxiety, stress, depression  All other systems were reviewed and are negative, except as noted.    Physical Exam:   GENERAL:  No acute distress  HEENT:  Normocephalic/atraumatic, no scleral icterus  CHEST:  Clear to auscultation bilaterally, no wheezes/rales/ronchi, no accessory muscle use  HEART:  Regular rate and rhythm, no murmurs/rubs/gallops  ABDOMEN:  Soft, non-tender, non-distended,  well healed incision  EXTREMITIES: RLE dressing intact, no drainage noted. +1 BLE edema.  SKIN:  No rash/erythema/ecchymoses/petechiae/wounds/abscess/warm/dry  NEURO:  Alert and oriented x 3, no asterixis

## 2020-12-03 LAB — EVEROLIMUS, WHOLE BLOOD RESULT: 5 NG/ML — SIGNIFICANT CHANGE UP (ref 3–8)

## 2020-12-04 NOTE — PHARMACOTHERAPY INTERVENTION NOTE - COMMENTS
Problem: Activity/Exercise Intolerance  Goal: Patient will tolerate activity/exercise  Intervention: Progress activity per Cardiac Rehab protocol  Note: Intervention Status  Done  Intervention: Progressive graded ambulation  Note: Intervention Status  Done  Intervention: Collaborate with nursing to ensure ambulation 4-6 times per day  Note: Intervention Status  Done  Intervention: Monitor and document progressive activity response  Note: Intervention Status  Done  Intervention: Encourage hourly incentive spirometry, cough and deep breathe  Note: Intervention Status  Done      65 yo M with DM A1C 5.8 on lantus 40 units subcutaneously at bedtime at home. Currently on insulin NPH 24 units subcutaneously at bedtime, humalog 6 units subcutaneously three times daily with meals, with low dose correctional scale. BG in past 24 hours were 296, 297, 292, 323, 304. BG was high this morning at 296 despite NPH administration at bedtime, and will likely not last until bedtime tonight. Recommendation made to switch to lantus 28 units subcutaneously at bedtime, increase humalog to 8 units subcutaneously three times daily, and add insulin NPH 14 units x 1 dose. May need to switch to moderate dose correctional scale, but will monitor on low scale for now.    Luis Cardenas, PharmD, BCPS  [phone]

## 2020-12-08 ENCOUNTER — APPOINTMENT (OUTPATIENT)
Dept: WOUND CARE | Facility: CLINIC | Age: 65
End: 2020-12-08

## 2020-12-14 ENCOUNTER — INPATIENT (INPATIENT)
Facility: HOSPITAL | Age: 65
LOS: 15 days | Discharge: HOME HEALTH | DRG: 871 | End: 2020-12-30
Attending: TRANSPLANT SURGERY | Admitting: TRANSPLANT SURGERY
Payer: MEDICARE

## 2020-12-14 ENCOUNTER — APPOINTMENT (OUTPATIENT)
Dept: TRANSPLANT | Facility: CLINIC | Age: 65
End: 2020-12-14

## 2020-12-14 VITALS
OXYGEN SATURATION: 98 % | DIASTOLIC BLOOD PRESSURE: 79 MMHG | RESPIRATION RATE: 17 BRPM | HEART RATE: 82 BPM | WEIGHT: 250 LBS | SYSTOLIC BLOOD PRESSURE: 122 MMHG | TEMPERATURE: 98 F | HEIGHT: 73 IN

## 2020-12-14 DIAGNOSIS — Z90.49 ACQUIRED ABSENCE OF OTHER SPECIFIED PARTS OF DIGESTIVE TRACT: Chronic | ICD-10-CM

## 2020-12-14 DIAGNOSIS — R74.01 ELEVATION OF LEVELS OF LIVER TRANSAMINASE LEVELS: ICD-10-CM

## 2020-12-14 LAB
25(OH)D3 SERPL-MCNC: 20.5 NG/ML
ALBUMIN SERPL ELPH-MCNC: 3.2 G/DL — LOW (ref 3.3–5)
ALBUMIN SERPL ELPH-MCNC: 3.5 G/DL
ALP BLD-CCNC: 173 U/L
ALP SERPL-CCNC: 169 U/L — HIGH (ref 40–120)
ALT FLD-CCNC: 88 U/L — HIGH (ref 10–45)
ALT SERPL-CCNC: 97 U/L
ANION GAP SERPL CALC-SCNC: 14 MMOL/L
ANION GAP SERPL CALC-SCNC: 15 MMOL/L — SIGNIFICANT CHANGE UP (ref 5–17)
APTT BLD: 27.2 SEC — LOW (ref 27.5–35.5)
AST SERPL-CCNC: 77 U/L — HIGH (ref 10–40)
AST SERPL-CCNC: 89 U/L
BASOPHILS # BLD AUTO: 0.03 K/UL — SIGNIFICANT CHANGE UP (ref 0–0.2)
BASOPHILS # BLD AUTO: 0.04 K/UL
BASOPHILS NFR BLD AUTO: 0.3 % — SIGNIFICANT CHANGE UP (ref 0–2)
BASOPHILS NFR BLD AUTO: 0.4 %
BILIRUB SERPL-MCNC: 0.3 MG/DL
BILIRUB SERPL-MCNC: 0.3 MG/DL — SIGNIFICANT CHANGE UP (ref 0.2–1.2)
BUN SERPL-MCNC: 50 MG/DL
BUN SERPL-MCNC: 53 MG/DL — HIGH (ref 7–23)
CALCIUM SERPL-MCNC: 9 MG/DL — SIGNIFICANT CHANGE UP (ref 8.4–10.5)
CALCIUM SERPL-MCNC: 9.2 MG/DL
CHLORIDE SERPL-SCNC: 101 MMOL/L
CHLORIDE SERPL-SCNC: 103 MMOL/L — SIGNIFICANT CHANGE UP (ref 96–108)
CO2 SERPL-SCNC: 16 MMOL/L — LOW (ref 22–31)
CO2 SERPL-SCNC: 20 MMOL/L
CREAT SERPL-MCNC: 3.26 MG/DL
CREAT SERPL-MCNC: 3.33 MG/DL — HIGH (ref 0.5–1.3)
EOSINOPHIL # BLD AUTO: 0.01 K/UL
EOSINOPHIL # BLD AUTO: 0.01 K/UL — SIGNIFICANT CHANGE UP (ref 0–0.5)
EOSINOPHIL NFR BLD AUTO: 0.1 %
EOSINOPHIL NFR BLD AUTO: 0.1 % — SIGNIFICANT CHANGE UP (ref 0–6)
ESTIMATED AVERAGE GLUCOSE: 171 MG/DL
GLUCOSE BLDC GLUCOMTR-MCNC: 209 MG/DL — HIGH (ref 70–99)
GLUCOSE BLDC GLUCOMTR-MCNC: 229 MG/DL — HIGH (ref 70–99)
GLUCOSE SERPL-MCNC: 158 MG/DL
GLUCOSE SERPL-MCNC: 244 MG/DL — HIGH (ref 70–99)
HBA1C MFR BLD HPLC: 7.6 %
HCT VFR BLD CALC: 25 % — LOW (ref 39–50)
HCT VFR BLD CALC: 26.1 %
HGB BLD-MCNC: 8.1 G/DL — LOW (ref 13–17)
HGB BLD-MCNC: 8.5 G/DL
IMM GRANULOCYTES NFR BLD AUTO: 0.5 %
IMM GRANULOCYTES NFR BLD AUTO: 1.6 % — HIGH (ref 0–1.5)
INR BLD: 1.26 RATIO — HIGH (ref 0.88–1.16)
LYMPHOCYTES # BLD AUTO: 0.65 K/UL — LOW (ref 1–3.3)
LYMPHOCYTES # BLD AUTO: 0.8 K/UL
LYMPHOCYTES # BLD AUTO: 6.8 % — LOW (ref 13–44)
LYMPHOCYTES NFR BLD AUTO: 7.5 %
MAGNESIUM SERPL-MCNC: 1.8 MG/DL
MAN DIFF?: NORMAL
MCHC RBC-ENTMCNC: 28.9 PG — SIGNIFICANT CHANGE UP (ref 27–34)
MCHC RBC-ENTMCNC: 29.4 PG
MCHC RBC-ENTMCNC: 32.4 GM/DL — SIGNIFICANT CHANGE UP (ref 32–36)
MCHC RBC-ENTMCNC: 32.6 GM/DL
MCV RBC AUTO: 89.3 FL — SIGNIFICANT CHANGE UP (ref 80–100)
MCV RBC AUTO: 90.3 FL
MONOCYTES # BLD AUTO: 0.98 K/UL — HIGH (ref 0–0.9)
MONOCYTES # BLD AUTO: 1.2 K/UL
MONOCYTES NFR BLD AUTO: 10.3 % — SIGNIFICANT CHANGE UP (ref 2–14)
MONOCYTES NFR BLD AUTO: 11.2 %
NEUTROPHILS # BLD AUTO: 7.72 K/UL — HIGH (ref 1.8–7.4)
NEUTROPHILS # BLD AUTO: 8.59 K/UL
NEUTROPHILS NFR BLD AUTO: 80.3 %
NEUTROPHILS NFR BLD AUTO: 80.9 % — HIGH (ref 43–77)
NRBC # BLD: 0 /100 WBCS — SIGNIFICANT CHANGE UP (ref 0–0)
PLATELET # BLD AUTO: 138 K/UL — LOW (ref 150–400)
PLATELET # BLD AUTO: 151 K/UL
POTASSIUM SERPL-MCNC: 4.5 MMOL/L — SIGNIFICANT CHANGE UP (ref 3.5–5.3)
POTASSIUM SERPL-SCNC: 4.5 MMOL/L — SIGNIFICANT CHANGE UP (ref 3.5–5.3)
POTASSIUM SERPL-SCNC: 4.7 MMOL/L
PROT SERPL-MCNC: 6.2 G/DL
PROT SERPL-MCNC: 6.5 G/DL — SIGNIFICANT CHANGE UP (ref 6–8.3)
PROTHROM AB SERPL-ACNC: 15 SEC — HIGH (ref 10.6–13.6)
RBC # BLD: 2.8 M/UL — LOW (ref 4.2–5.8)
RBC # BLD: 2.89 M/UL
RBC # FLD: 13.6 % — SIGNIFICANT CHANGE UP (ref 10.3–14.5)
RBC # FLD: 13.8 %
SARS-COV-2 RNA SPEC QL NAA+PROBE: SIGNIFICANT CHANGE UP
SODIUM SERPL-SCNC: 134 MMOL/L
SODIUM SERPL-SCNC: 134 MMOL/L — LOW (ref 135–145)
WBC # BLD: 9.54 K/UL — SIGNIFICANT CHANGE UP (ref 3.8–10.5)
WBC # FLD AUTO: 10.69 K/UL
WBC # FLD AUTO: 9.54 K/UL — SIGNIFICANT CHANGE UP (ref 3.8–10.5)

## 2020-12-14 PROCEDURE — 99232 SBSQ HOSP IP/OBS MODERATE 35: CPT | Mod: GC,24

## 2020-12-14 PROCEDURE — 99285 EMERGENCY DEPT VISIT HI MDM: CPT

## 2020-12-14 PROCEDURE — 93010 ELECTROCARDIOGRAM REPORT: CPT

## 2020-12-14 RX ORDER — SODIUM CHLORIDE 9 MG/ML
1000 INJECTION INTRAMUSCULAR; INTRAVENOUS; SUBCUTANEOUS
Refills: 0 | Status: DISCONTINUED | OUTPATIENT
Start: 2020-12-14 | End: 2020-12-16

## 2020-12-14 RX ORDER — CEFEPIME 1 G/1
2000 INJECTION, POWDER, FOR SOLUTION INTRAMUSCULAR; INTRAVENOUS ONCE
Refills: 0 | Status: COMPLETED | OUTPATIENT
Start: 2020-12-14 | End: 2020-12-14

## 2020-12-14 RX ADMIN — SODIUM CHLORIDE 100 MILLILITER(S): 9 INJECTION INTRAMUSCULAR; INTRAVENOUS; SUBCUTANEOUS at 21:10

## 2020-12-14 RX ADMIN — CEFEPIME 100 MILLIGRAM(S): 1 INJECTION, POWDER, FOR SOLUTION INTRAMUSCULAR; INTRAVENOUS at 21:09

## 2020-12-14 NOTE — ED ADULT NURSE NOTE - OBJECTIVE STATEMENT
66 y/o male s/p liver transplant in July with a past medical history of cirrhosis, HTN, DM presents to the ED from home with abnormal labs. Patient reports he has had non bloody diarrhea x 2 days, chills and mild weakness. Pt was discharged 2 weeks ago for osteomyelitis of the R heel with a PICC line placed for ATB. Denies HA, vision changes, fever, n/v, weakness, abd pain, constipation, numbness/tingling, urinary s/s. Patient A&Ox4, in no respiratory distress, no chest pain.  Abdomen soft, nontender and nondistended. Wound noted to R heel. Dressing clean and intact. Peripheral pulses strong. Sensation fully intact. Patient safety provided, call bell within reach and bed in the lowest position. 66 y/o male s/p liver transplant in July with a past medical history of cirrhosis, HTN, DM presents to the ED from home with abnormal labs. Pt states he has "elevated liver and kidney enzymes." Patient reports he has had non bloody diarrhea x 2 days, chills and mild weakness. Pt was discharged 2 weeks ago for osteomyelitis of the R heel with a PICC line placed for ATB. Denies HA, vision changes, fever, n/v, weakness, abd pain, constipation, numbness/tingling, urinary s/s. Patient A&Ox4, in no respiratory distress, no chest pain.  Abdomen soft, nontender and nondistended. Wound noted to R heel. Dressing clean and intact. Peripheral pulses strong. Sensation fully intact. Patient safety provided, call bell within reach and bed in the lowest position.

## 2020-12-14 NOTE — H&P ADULT - ASSESSMENT
65M with IDDM, HLD, obesity, HFpEF with mild LV diastolic dysfunction, MGUS, chronic anemia with a history of duodenal ulcer 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  Underwent OLT on 7/2/2020, post op coure c/b VRE bacteremia tx with linezolid and delirium likely in setting of CNI toxicity (FK discontinued/Everolimus initiated 7/27). Recent admission from 11/20-12/2 for OM of the R heel, s/p debridement with Podiatry, and was discharged with Cefepime x 6 weeks via PICC line.  Returns with new transaminitis, MISA, diarrhea.  Admitted for further w/u.    s/p OLTx now with MISA/transaminitis  -Admit to [facility]  -IVF @ 100cc/h  -check full cultures  -Immuno: Everolimus 2mg QD, continue to HOLD MMF, Pred 5  -PPx: Bactrim/Valcyte MWF    IDDM  -restart Lantus/Lispro   65M with IDDM, HLD, obesity, HFpEF with mild LV diastolic dysfunction, MGUS, chronic anemia with a history of duodenal ulcer 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  Underwent OLT on 7/2/2020, post op coure c/b VRE bacteremia tx with linezolid and delirium likely in setting of CNI toxicity (FK discontinued/Everolimus initiated 7/27). Recent admission from 11/20-12/2 for OM of the R heel, s/p debridement with Podiatry, and was discharged with Cefepime x 6 weeks via PICC line.  Returns with new transaminitis, MISA, diarrhea.  Admitted for further w/u.    s/p OLTx now with MISA/transaminitis  -Admit to [facility]  -IVF @ 100cc/h  -check full cultures  -Immuno: Everolimus 2mg QD, continue to HOLD MMF, Pred 5  -PPx: Bactrim/Valcyte MWF          IDDM  -restart Lantus/Lispro

## 2020-12-14 NOTE — ED PROVIDER NOTE - RAPID ASSESSMENT
65 y.o M s/p liver transplant in july 2020 presents after having blood work done found to have abnormal lab results.  Was referred here for further evaluation.    **Pt seen in the waiting room via teletriage by Baldemar Puckett (MD), documentation completed by Luz Maria Larson. Pt to be sent to main ED for further evaluation - all orders placed to be followed by MD in the main ED**   Baldemar Puckett (MD) note: The scribe (Luz Maria Plata)'s documentation has been prepared under my direction and personally reviewed by me.  Patient was seen as a tele QDOC patient, a thorough physical exam was not performed. The patient will be seen and further worked up in the main emergency department and their care will be completed by the main emergency department team. Receiving team will follow up on labs, analgesia, any clinical imaging, reassess and disposition as clinically indicated, all decisions regarding the progression of care will be made at their discretion.

## 2020-12-14 NOTE — H&P ADULT - NSHPREVIEWOFSYSTEMS_GEN_ALL_CORE
Gen: No weight changes, fevers/chills, +fatgiue  Skin: No rashes  Head/Eyes/Ears/Mouth: No headache; Normal hearing; Normal vision w/o blurriness; No sinus pain/discomfort, sore throat  Respiratory: No dyspnea, cough, wheezing, hemoptysis  CV: No chest pain, PND, orthopnea  GI: no abdominal pain, constipation, nausea, vomiting, melena, hematochezia ,+diarrhea  : No increased frequency, dysuria, hematuria, nocturia  MSK: No joint pain/swelling; no back pain; no edema  Neuro: No dizziness/lightheadedness, weakness, seizures, numbness, tingling  Heme: No easy bruising or bleeding  Endo: No heat/cold intolerance  Psych: No significant nervousness, anxiety, stress, depression  All other systems were reviewed and are negative, except as noted.

## 2020-12-14 NOTE — ED PROVIDER NOTE - OBJECTIVE STATEMENT
64 y/o male with pmhx cirrhosis s/p liver transplant, HTN, hypercholesterolemia, CHF, and diabetes presenting with transaminitis and MISA. Patient sent in by hepatologist (Dr. Jade). Patient recently dced for osteomyelitis of right heel with PICC line and IV abx. Patient reports that he has 2-3 episodes of non bloody diarrhea and intermittent chills but denies any abdominal pain, confusion, headache. dizziness, changes in vision/hearing, 66 y/o male with pmhx cirrhosis s/p liver transplant, HTN, hypercholesterolemia, CHF, and diabetes presenting with transaminitis and MISA. Patient sent in by hepatologist (Dr. Jade). Patient recently dced for osteomyelitis of right heel with PICC line and IV abx. Patient reports that he has 2-3 episodes of non bloody diarrhea and intermittent chills but denies any abdominal pain, confusion, headache. dizziness, changes in vision/hearing, tremors, cough, changes in urination, or sick contacts. Patient also reports mild pain at right heel ulcer but denies any active drainage or bleeding. Transplant surgery aware of patient and requesting admission. 64 y/o male with pmhx cirrhosis s/p liver transplant, HTN, hypercholesterolemia, CHF, and diabetes presenting with transaminitis and MISA. Patient sent in by hepatologist (Dr. Jade). Patient recently dced for osteomyelitis of right heel with PICC line and IV abx. Patient reports that he has 2-3 episodes of non bloody diarrhea and intermittent chills but denies any abdominal pain, confusion, headache, dizziness, changes in vision/hearing, tremors, cough, changes in urination, or sick contacts. Patient also reports mild pain at right heel ulcer but denies any active drainage or bleeding. Transplant surgery aware of patient and requesting admission.

## 2020-12-14 NOTE — ED PROVIDER NOTE - ATTENDING CONTRIBUTION TO CARE
Attending Statement (SHANNON Li MD):    HPI: 66y/o M with h/o DM, JEAN related cirrhosis s/p liver transplant 7/2020 (here), HLD, CHF, presenting with worsening outpatient labs as part of outpatient evaluation / surveillance through his hepatologist (dr Jade).  Patient advised of abnormal labs today and reports he was told to come to ED for admission.  States he has been feeling more tired but notes no fever/chills, nausea/vomiting, abdominal pain, CP, SOB, change in urination or change in bowel movements.    Review of Systems:  -General: no fever or chills  -ENT: no congestion, no difficulty swallowing  -Pulmonary: no cough, no shortness of breath  -Cardiac: no chest pain, no palpitations  -Gastrointestinal: no abdominal pain, no nausea, no vomiting, and no diarrhea.  -Genitourinary: no blood or pain with urination  -Musculoskeletal: no back or neck pain  -Skin: no rashes  -Endocrine: + h/o diabetes  -Neurologic: No focal weakness or numbness    All else negative unless otherwise specified elsewhere in this note.    PSH/PMH as noted above    On Physical Exam:  General: well appearing, in NAD, speaking clearly in full sentences and without difficulty; cooperative with exam  HEENT: no scleral icterus. airway patent, moist mucus membranes.  Neck: no neck tenderness, no nuchal rigidity  Cardiac: normal s1, s2; RRR; no MGR  Lungs: CTABL  Abdomen: soft nontender/nondistended  : no bladder tenderness or distension  Skin: intact, no rash  Extremities: no peripheral edema, no gross deformities  Neuro: no gross neurologic deficits    MDM:  66y/o M with h/o DM, JEAN related cirrhosis s/p liver transplant 7/2020 (here), HLD, CHF, presenting with worsening outpatient labs; repeat labs here show worsening renal function and elevated LFTs; is afebrile; will consult with transplant team and plan for admission.

## 2020-12-14 NOTE — ED PROVIDER NOTE - PHYSICAL EXAMINATION
face
Gen: WDWN, NAD   HEENT: PERRLA, EOMI, no nasal discharge, mucous membranes moist  CV: RRR, +S1/S2, no M/R/G  Resp: CTAB, no W/R/R  GI: Abdomen soft non-distended, NTTP, no masses  MSK: Right AC PICC line; clean/dry/intact. RLE 4x4 cm ulcer, with overlying granulation tissue and surrounding erythema, no visible bone.  Neuro: CN2-12 grossly intact, A&Ox4, MS +5/5 in UE and LE BL, finger to nose smooth and rapid, gross sensation intact in UE and LE BL, gait smooth and coordinated, no asterixis   Psych: appropriate mood

## 2020-12-14 NOTE — ED PROVIDER NOTE - NS ED ROS FT
Gen: Denies fever, weight loss  CV: Denies chest pain, palpitations  Skin: Denies rash, erythema, color changes  Resp: Denies SOB, cough  Endo: Denies sensitivity to heat, cold, increased urination  GI: Denies constipation, nausea, vomiting  Msk: Denies back pain, LE swelling,  : Denies dysuria, increased frequency  Neuro: Denies LOC, weakness, numbness/tingling

## 2020-12-14 NOTE — H&P ADULT - HISTORY OF PRESENT ILLNESS
Transplant Surgery   --------------------------------------------------------------   Donor OLTx     2020    65M with IDDM, HLD, obesity, HFpEF with mild LV diastolic dysfunction, MGUS, chronic anemia with a history of duodenal ulcer 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  Underwent OLT on 2020, post op coure c/b VRE bacteremia tx with linezolid and delirium likely in setting of CNI toxicity (FK discontinued/Everolimus initiated ). Recent admission from - for OM of the R heel, s/p debridement with Podiatry, and was discharged with Cefepime x 6 weeks via PICC line    Was also found with neutropenia this admission, Valcyte was reduced (was on 900mg daily CMV +/-).      Sent in from clinic after lab work revealed new transaminitis and MISA to 3.  Upon evaluation, pt c/o 24h of diarrhea and associated fatigue.  No recent travel or sick contacts.  R heel wound has been changed as instructed by wife without any new changes (including malodor, new purulence or worsening drainage). Has been compliant with all his transplant medication regimen, including ABX.    Denies fevers, chills, n/v, CP, SOB, extremity pain/edema, urinary changes or signs of bleeding

## 2020-12-14 NOTE — H&P ADULT - NSHPPHYSICALEXAM_GEN_ALL_CORE
Constitutional: Well developed / well nourished but weak  Eyes: anicteric, PERRLA  ENMT: nc/at, no thrush  Neck: supple  Respiratory: CTA B/L  Cardiovascular: RRR  Gastrointestinal: Soft abdomen, ND, appropriate incisional TTP. chevron incision c/d/i, staples in place. No signs of infection. T-tube draining bile to bag  Genitourinary: Voiding spontaneously  Extremities: SCD's in place and working bilaterally  Vascular: Palpable dp pulses bilaterally.   Neurological: A&O x3  Skin: no rashes, ulcerations, lesions  Musculoskeletal: Moving all extremities  Psychiatric: Responsive

## 2020-12-15 ENCOUNTER — TRANSCRIPTION ENCOUNTER (OUTPATIENT)
Age: 65
End: 2020-12-15

## 2020-12-15 LAB
ALBUMIN SERPL ELPH-MCNC: 2.9 G/DL — LOW (ref 3.3–5)
ALBUMIN SERPL ELPH-MCNC: 3 G/DL — LOW (ref 3.3–5)
ALP SERPL-CCNC: 167 U/L — HIGH (ref 40–120)
ALP SERPL-CCNC: 177 U/L — HIGH (ref 40–120)
ALT FLD-CCNC: 81 U/L — HIGH (ref 10–45)
ALT FLD-CCNC: 83 U/L — HIGH (ref 10–45)
AMYLASE P1 CFR SERPL: 21 U/L — LOW (ref 25–125)
ANION GAP SERPL CALC-SCNC: 12 MMOL/L — SIGNIFICANT CHANGE UP (ref 5–17)
ANION GAP SERPL CALC-SCNC: 15 MMOL/L — SIGNIFICANT CHANGE UP (ref 5–17)
APPEARANCE UR: ABNORMAL
APTT BLD: 27.6 SEC — SIGNIFICANT CHANGE UP (ref 27.5–35.5)
AST SERPL-CCNC: 61 U/L — HIGH (ref 10–40)
AST SERPL-CCNC: 64 U/L — HIGH (ref 10–40)
BACTERIA # UR AUTO: NEGATIVE — SIGNIFICANT CHANGE UP
BASOPHILS # BLD AUTO: 0.02 K/UL — SIGNIFICANT CHANGE UP (ref 0–0.2)
BASOPHILS NFR BLD AUTO: 0.2 % — SIGNIFICANT CHANGE UP (ref 0–2)
BILIRUB SERPL-MCNC: 0.3 MG/DL — SIGNIFICANT CHANGE UP (ref 0.2–1.2)
BILIRUB SERPL-MCNC: 0.3 MG/DL — SIGNIFICANT CHANGE UP (ref 0.2–1.2)
BILIRUB UR-MCNC: NEGATIVE — SIGNIFICANT CHANGE UP
BUN SERPL-MCNC: 54 MG/DL — HIGH (ref 7–23)
BUN SERPL-MCNC: 55 MG/DL — HIGH (ref 7–23)
C DIFF GDH STL QL: SIGNIFICANT CHANGE UP
C DIFF GDH STL QL: SIGNIFICANT CHANGE UP
CALCIUM SERPL-MCNC: 8.7 MG/DL — SIGNIFICANT CHANGE UP (ref 8.4–10.5)
CALCIUM SERPL-MCNC: 8.8 MG/DL — SIGNIFICANT CHANGE UP (ref 8.4–10.5)
CHLORIDE SERPL-SCNC: 103 MMOL/L — SIGNIFICANT CHANGE UP (ref 96–108)
CHLORIDE SERPL-SCNC: 103 MMOL/L — SIGNIFICANT CHANGE UP (ref 96–108)
CHLORIDE UR-SCNC: 43 MMOL/L — SIGNIFICANT CHANGE UP
CO2 SERPL-SCNC: 16 MMOL/L — LOW (ref 22–31)
CO2 SERPL-SCNC: 18 MMOL/L — LOW (ref 22–31)
COLOR SPEC: YELLOW — SIGNIFICANT CHANGE UP
CREAT ?TM UR-MCNC: 67 MG/DL — SIGNIFICANT CHANGE UP
CREAT SERPL-MCNC: 3.68 MG/DL — HIGH (ref 0.5–1.3)
CREAT SERPL-MCNC: 3.91 MG/DL — HIGH (ref 0.5–1.3)
DIFF PNL FLD: ABNORMAL
EOSINOPHIL # BLD AUTO: 0.01 K/UL — SIGNIFICANT CHANGE UP (ref 0–0.5)
EOSINOPHIL NFR BLD AUTO: 0.1 % — SIGNIFICANT CHANGE UP (ref 0–6)
EPI CELLS # UR: 0 /HPF — SIGNIFICANT CHANGE UP
EVEROLIMUS BLD-MCNC: 5.9 NG/ML
EVEROLIMUS, WHOLE BLOOD RESULT: 5.4 NG/ML — SIGNIFICANT CHANGE UP (ref 3–8)
EVEROLIMUS, WHOLE BLOOD RESULT: 5.9 NG/ML — SIGNIFICANT CHANGE UP (ref 3–8)
GLUCOSE BLDC GLUCOMTR-MCNC: 149 MG/DL — HIGH (ref 70–99)
GLUCOSE BLDC GLUCOMTR-MCNC: 153 MG/DL — HIGH (ref 70–99)
GLUCOSE BLDC GLUCOMTR-MCNC: 166 MG/DL — HIGH (ref 70–99)
GLUCOSE BLDC GLUCOMTR-MCNC: 172 MG/DL — HIGH (ref 70–99)
GLUCOSE BLDC GLUCOMTR-MCNC: 220 MG/DL — HIGH (ref 70–99)
GLUCOSE SERPL-MCNC: 172 MG/DL — HIGH (ref 70–99)
GLUCOSE SERPL-MCNC: 185 MG/DL — HIGH (ref 70–99)
GLUCOSE UR QL: ABNORMAL
HCT VFR BLD CALC: 24.6 % — LOW (ref 39–50)
HGB BLD-MCNC: 8.2 G/DL — LOW (ref 13–17)
HYALINE CASTS # UR AUTO: 0 /LPF — SIGNIFICANT CHANGE UP (ref 0–2)
IMM GRANULOCYTES NFR BLD AUTO: 0.7 % — SIGNIFICANT CHANGE UP (ref 0–1.5)
INR BLD: 1.31 RATIO — HIGH (ref 0.88–1.16)
KETONES UR-MCNC: NEGATIVE — SIGNIFICANT CHANGE UP
LEUKOCYTE ESTERASE UR-ACNC: ABNORMAL
LIDOCAIN IGE QN: 22 U/L — SIGNIFICANT CHANGE UP (ref 7–60)
LYMPHOCYTES # BLD AUTO: 0.56 K/UL — LOW (ref 1–3.3)
LYMPHOCYTES # BLD AUTO: 5.7 % — LOW (ref 13–44)
MAGNESIUM SERPL-MCNC: 1.9 MG/DL — SIGNIFICANT CHANGE UP (ref 1.6–2.6)
MCHC RBC-ENTMCNC: 29.1 PG — SIGNIFICANT CHANGE UP (ref 27–34)
MCHC RBC-ENTMCNC: 33.3 GM/DL — SIGNIFICANT CHANGE UP (ref 32–36)
MCV RBC AUTO: 87.2 FL — SIGNIFICANT CHANGE UP (ref 80–100)
MONOCYTES # BLD AUTO: 1.01 K/UL — HIGH (ref 0–0.9)
MONOCYTES NFR BLD AUTO: 10.3 % — SIGNIFICANT CHANGE UP (ref 2–14)
NEUTROPHILS # BLD AUTO: 8.12 K/UL — HIGH (ref 1.8–7.4)
NEUTROPHILS NFR BLD AUTO: 83 % — HIGH (ref 43–77)
NITRITE UR-MCNC: NEGATIVE — SIGNIFICANT CHANGE UP
NRBC # BLD: 0 /100 WBCS — SIGNIFICANT CHANGE UP (ref 0–0)
OSMOLALITY UR: 311 MOS/KG — SIGNIFICANT CHANGE UP (ref 300–900)
PH UR: 6 — SIGNIFICANT CHANGE UP (ref 5–8)
PHOSPHATE SERPL-MCNC: 2.8 MG/DL — SIGNIFICANT CHANGE UP (ref 2.5–4.5)
PLATELET # BLD AUTO: 136 K/UL — LOW (ref 150–400)
POTASSIUM SERPL-MCNC: 4.3 MMOL/L — SIGNIFICANT CHANGE UP (ref 3.5–5.3)
POTASSIUM SERPL-MCNC: 4.3 MMOL/L — SIGNIFICANT CHANGE UP (ref 3.5–5.3)
POTASSIUM SERPL-SCNC: 4.3 MMOL/L — SIGNIFICANT CHANGE UP (ref 3.5–5.3)
POTASSIUM SERPL-SCNC: 4.3 MMOL/L — SIGNIFICANT CHANGE UP (ref 3.5–5.3)
PROT ?TM UR-MCNC: 193 MG/DL — HIGH (ref 0–12)
PROT SERPL-MCNC: 5.9 G/DL — LOW (ref 6–8.3)
PROT SERPL-MCNC: 6.1 G/DL — SIGNIFICANT CHANGE UP (ref 6–8.3)
PROT UR-MCNC: ABNORMAL
PROTHROM AB SERPL-ACNC: 15.5 SEC — HIGH (ref 10.6–13.6)
RBC # BLD: 2.82 M/UL — LOW (ref 4.2–5.8)
RBC # FLD: 13.7 % — SIGNIFICANT CHANGE UP (ref 10.3–14.5)
RBC CASTS # UR COMP ASSIST: 7 /HPF — HIGH (ref 0–4)
SODIUM SERPL-SCNC: 133 MMOL/L — LOW (ref 135–145)
SODIUM SERPL-SCNC: 134 MMOL/L — LOW (ref 135–145)
SODIUM UR-SCNC: 44 MMOL/L — SIGNIFICANT CHANGE UP
SP GR SPEC: 1.01 — SIGNIFICANT CHANGE UP (ref 1.01–1.02)
UROBILINOGEN FLD QL: NEGATIVE — SIGNIFICANT CHANGE UP
UUN UR-MCNC: 367 MG/DL — SIGNIFICANT CHANGE UP
WBC # BLD: 9.79 K/UL — SIGNIFICANT CHANGE UP (ref 3.8–10.5)
WBC # FLD AUTO: 9.79 K/UL — SIGNIFICANT CHANGE UP (ref 3.8–10.5)
WBC UR QL: 299 /HPF — HIGH (ref 0–5)

## 2020-12-15 PROCEDURE — 71045 X-RAY EXAM CHEST 1 VIEW: CPT | Mod: 26

## 2020-12-15 PROCEDURE — 93306 TTE W/DOPPLER COMPLETE: CPT | Mod: 26

## 2020-12-15 PROCEDURE — 99232 SBSQ HOSP IP/OBS MODERATE 35: CPT | Mod: GC,24

## 2020-12-15 PROCEDURE — 99223 1ST HOSP IP/OBS HIGH 75: CPT

## 2020-12-15 PROCEDURE — 74176 CT ABD & PELVIS W/O CONTRAST: CPT | Mod: 26

## 2020-12-15 PROCEDURE — 93975 VASCULAR STUDY: CPT | Mod: 26

## 2020-12-15 PROCEDURE — 93010 ELECTROCARDIOGRAM REPORT: CPT

## 2020-12-15 RX ORDER — MEROPENEM 1 G/30ML
500 INJECTION INTRAVENOUS EVERY 12 HOURS
Refills: 0 | Status: DISCONTINUED | OUTPATIENT
Start: 2020-12-15 | End: 2020-12-18

## 2020-12-15 RX ORDER — DEXTROSE 50 % IN WATER 50 %
15 SYRINGE (ML) INTRAVENOUS ONCE
Refills: 0 | Status: DISCONTINUED | OUTPATIENT
Start: 2020-12-15 | End: 2020-12-18

## 2020-12-15 RX ORDER — VANCOMYCIN HCL 1 G
1000 VIAL (EA) INTRAVENOUS ONCE
Refills: 0 | Status: COMPLETED | OUTPATIENT
Start: 2020-12-15 | End: 2020-12-15

## 2020-12-15 RX ORDER — MAGNESIUM OXIDE 400 MG ORAL TABLET 241.3 MG
800 TABLET ORAL DAILY
Refills: 0 | Status: DISCONTINUED | OUTPATIENT
Start: 2020-12-15 | End: 2020-12-17

## 2020-12-15 RX ORDER — EVEROLIMUS 10 MG/1
2 TABLET ORAL
Refills: 0 | Status: DISCONTINUED | OUTPATIENT
Start: 2020-12-15 | End: 2020-12-17

## 2020-12-15 RX ORDER — DAPTOMYCIN 500 MG/10ML
INJECTION, POWDER, LYOPHILIZED, FOR SOLUTION INTRAVENOUS
Refills: 0 | Status: DISCONTINUED | OUTPATIENT
Start: 2020-12-15 | End: 2020-12-16

## 2020-12-15 RX ORDER — TAMSULOSIN HYDROCHLORIDE 0.4 MG/1
0.4 CAPSULE ORAL AT BEDTIME
Refills: 0 | Status: DISCONTINUED | OUTPATIENT
Start: 2020-12-15 | End: 2020-12-30

## 2020-12-15 RX ORDER — EVEROLIMUS 10 MG/1
2 TABLET ORAL
Refills: 0 | Status: DISCONTINUED | OUTPATIENT
Start: 2020-12-15 | End: 2020-12-15

## 2020-12-15 RX ORDER — DEXTROSE 50 % IN WATER 50 %
12.5 SYRINGE (ML) INTRAVENOUS ONCE
Refills: 0 | Status: DISCONTINUED | OUTPATIENT
Start: 2020-12-15 | End: 2020-12-18

## 2020-12-15 RX ORDER — INFLUENZA VIRUS VACCINE 15; 15; 15; 15 UG/.5ML; UG/.5ML; UG/.5ML; UG/.5ML
0.5 SUSPENSION INTRAMUSCULAR ONCE
Refills: 0 | Status: DISCONTINUED | OUTPATIENT
Start: 2020-12-15 | End: 2020-12-18

## 2020-12-15 RX ORDER — NYSTATIN 500MM UNIT
500000 POWDER (EA) MISCELLANEOUS
Refills: 0 | Status: DISCONTINUED | OUTPATIENT
Start: 2020-12-15 | End: 2020-12-30

## 2020-12-15 RX ORDER — SODIUM CHLORIDE 9 MG/ML
1000 INJECTION, SOLUTION INTRAVENOUS
Refills: 0 | Status: DISCONTINUED | OUTPATIENT
Start: 2020-12-15 | End: 2020-12-18

## 2020-12-15 RX ORDER — SODIUM CHLORIDE 9 MG/ML
500 INJECTION INTRAMUSCULAR; INTRAVENOUS; SUBCUTANEOUS ONCE
Refills: 0 | Status: COMPLETED | OUTPATIENT
Start: 2020-12-15 | End: 2020-12-15

## 2020-12-15 RX ORDER — EVEROLIMUS 10 MG/1
2 TABLET ORAL ONCE
Refills: 0 | Status: DISCONTINUED | OUTPATIENT
Start: 2020-12-15 | End: 2020-12-15

## 2020-12-15 RX ORDER — GLUCAGON INJECTION, SOLUTION 0.5 MG/.1ML
1 INJECTION, SOLUTION SUBCUTANEOUS ONCE
Refills: 0 | Status: DISCONTINUED | OUTPATIENT
Start: 2020-12-15 | End: 2020-12-18

## 2020-12-15 RX ORDER — CEFEPIME 1 G/1
2000 INJECTION, POWDER, FOR SOLUTION INTRAMUSCULAR; INTRAVENOUS EVERY 12 HOURS
Refills: 0 | Status: DISCONTINUED | OUTPATIENT
Start: 2020-12-15 | End: 2020-12-15

## 2020-12-15 RX ORDER — DEXTROSE 50 % IN WATER 50 %
25 SYRINGE (ML) INTRAVENOUS ONCE
Refills: 0 | Status: DISCONTINUED | OUTPATIENT
Start: 2020-12-15 | End: 2020-12-18

## 2020-12-15 RX ORDER — PANTOPRAZOLE SODIUM 20 MG/1
40 TABLET, DELAYED RELEASE ORAL
Refills: 0 | Status: DISCONTINUED | OUTPATIENT
Start: 2020-12-15 | End: 2020-12-23

## 2020-12-15 RX ORDER — TAMSULOSIN HYDROCHLORIDE 0.4 MG/1
0.4 CAPSULE ORAL ONCE
Refills: 0 | Status: COMPLETED | OUTPATIENT
Start: 2020-12-15 | End: 2020-12-15

## 2020-12-15 RX ORDER — VALGANCICLOVIR 450 MG/1
450 TABLET, FILM COATED ORAL DAILY
Refills: 0 | Status: DISCONTINUED | OUTPATIENT
Start: 2020-12-15 | End: 2020-12-15

## 2020-12-15 RX ORDER — INSULIN LISPRO 100/ML
VIAL (ML) SUBCUTANEOUS ONCE
Refills: 0 | Status: COMPLETED | OUTPATIENT
Start: 2020-12-15 | End: 2020-12-15

## 2020-12-15 RX ORDER — INSULIN GLARGINE 100 [IU]/ML
10 INJECTION, SOLUTION SUBCUTANEOUS AT BEDTIME
Refills: 0 | Status: DISCONTINUED | OUTPATIENT
Start: 2020-12-15 | End: 2020-12-20

## 2020-12-15 RX ORDER — VALGANCICLOVIR 450 MG/1
450 TABLET, FILM COATED ORAL
Refills: 0 | Status: DISCONTINUED | OUTPATIENT
Start: 2020-12-15 | End: 2020-12-19

## 2020-12-15 RX ORDER — ASPIRIN/CALCIUM CARB/MAGNESIUM 324 MG
81 TABLET ORAL DAILY
Refills: 0 | Status: DISCONTINUED | OUTPATIENT
Start: 2020-12-15 | End: 2020-12-30

## 2020-12-15 RX ORDER — INSULIN LISPRO 100/ML
VIAL (ML) SUBCUTANEOUS
Refills: 0 | Status: DISCONTINUED | OUTPATIENT
Start: 2020-12-15 | End: 2020-12-20

## 2020-12-15 RX ORDER — ACETAMINOPHEN 500 MG
650 TABLET ORAL ONCE
Refills: 0 | Status: DISCONTINUED | OUTPATIENT
Start: 2020-12-15 | End: 2020-12-15

## 2020-12-15 RX ORDER — ONDANSETRON 8 MG/1
4 TABLET, FILM COATED ORAL ONCE
Refills: 0 | Status: COMPLETED | OUTPATIENT
Start: 2020-12-15 | End: 2020-12-15

## 2020-12-15 RX ORDER — HEPARIN SODIUM 5000 [USP'U]/ML
5000 INJECTION INTRAVENOUS; SUBCUTANEOUS EVERY 12 HOURS
Refills: 0 | Status: DISCONTINUED | OUTPATIENT
Start: 2020-12-15 | End: 2020-12-30

## 2020-12-15 RX ORDER — DAPTOMYCIN 500 MG/10ML
800 INJECTION, POWDER, LYOPHILIZED, FOR SOLUTION INTRAVENOUS ONCE
Refills: 0 | Status: COMPLETED | OUTPATIENT
Start: 2020-12-15 | End: 2020-12-15

## 2020-12-15 RX ORDER — INSULIN LISPRO 100/ML
VIAL (ML) SUBCUTANEOUS AT BEDTIME
Refills: 0 | Status: DISCONTINUED | OUTPATIENT
Start: 2020-12-15 | End: 2020-12-30

## 2020-12-15 RX ORDER — MEROPENEM 1 G/30ML
INJECTION INTRAVENOUS
Refills: 0 | Status: DISCONTINUED | OUTPATIENT
Start: 2020-12-15 | End: 2020-12-18

## 2020-12-15 RX ORDER — VANCOMYCIN HCL 1 G
125 VIAL (EA) INTRAVENOUS EVERY 6 HOURS
Refills: 0 | Status: DISCONTINUED | OUTPATIENT
Start: 2020-12-15 | End: 2020-12-29

## 2020-12-15 RX ORDER — MEROPENEM 1 G/30ML
500 INJECTION INTRAVENOUS ONCE
Refills: 0 | Status: COMPLETED | OUTPATIENT
Start: 2020-12-15 | End: 2020-12-15

## 2020-12-15 RX ADMIN — Medication 81 MILLIGRAM(S): at 14:21

## 2020-12-15 RX ADMIN — Medication 1: at 08:32

## 2020-12-15 RX ADMIN — EVEROLIMUS 2 MILLIGRAM(S): 10 TABLET ORAL at 08:30

## 2020-12-15 RX ADMIN — ONDANSETRON 4 MILLIGRAM(S): 8 TABLET, FILM COATED ORAL at 02:16

## 2020-12-15 RX ADMIN — PANTOPRAZOLE SODIUM 40 MILLIGRAM(S): 20 TABLET, DELAYED RELEASE ORAL at 06:19

## 2020-12-15 RX ADMIN — TAMSULOSIN HYDROCHLORIDE 0.4 MILLIGRAM(S): 0.4 CAPSULE ORAL at 22:08

## 2020-12-15 RX ADMIN — Medication 2: at 02:16

## 2020-12-15 RX ADMIN — INSULIN GLARGINE 10 UNIT(S): 100 INJECTION, SOLUTION SUBCUTANEOUS at 22:06

## 2020-12-15 RX ADMIN — Medication 5 MILLIGRAM(S): at 05:53

## 2020-12-15 RX ADMIN — Medication 1: at 17:22

## 2020-12-15 RX ADMIN — Medication 500000 UNIT(S): at 17:32

## 2020-12-15 RX ADMIN — TAMSULOSIN HYDROCHLORIDE 0.4 MILLIGRAM(S): 0.4 CAPSULE ORAL at 02:16

## 2020-12-15 RX ADMIN — Medication 1 TABLET(S): at 14:21

## 2020-12-15 RX ADMIN — Medication 125 MILLIGRAM(S): at 17:23

## 2020-12-15 RX ADMIN — SODIUM CHLORIDE 1000 MILLILITER(S): 9 INJECTION INTRAMUSCULAR; INTRAVENOUS; SUBCUTANEOUS at 08:00

## 2020-12-15 RX ADMIN — EVEROLIMUS 2 MILLIGRAM(S): 10 TABLET ORAL at 20:13

## 2020-12-15 RX ADMIN — MEROPENEM 100 MILLIGRAM(S): 1 INJECTION INTRAVENOUS at 22:07

## 2020-12-15 RX ADMIN — MEROPENEM 100 MILLIGRAM(S): 1 INJECTION INTRAVENOUS at 10:10

## 2020-12-15 RX ADMIN — MAGNESIUM OXIDE 400 MG ORAL TABLET 800 MILLIGRAM(S): 241.3 TABLET ORAL at 14:21

## 2020-12-15 RX ADMIN — Medication 250 MILLIGRAM(S): at 08:30

## 2020-12-15 RX ADMIN — DAPTOMYCIN 132 MILLIGRAM(S): 500 INJECTION, POWDER, LYOPHILIZED, FOR SOLUTION INTRAVENOUS at 17:32

## 2020-12-15 NOTE — CONSULT NOTE ADULT - SUBJECTIVE AND OBJECTIVE BOX
Podiatry pager #: [phone]    Patient is a 65y old  Male who presents with a chief complaint of transaminitis (14 Dec 2020 18:48)      HPI:  Transplant Surgery   --------------------------------------------------------------   Donor OLTx     2020    65M with IDDM, HLD, obesity, HFpEF with mild LV diastolic dysfunction, MGUS, chronic anemia with a history of duodenal ulcer 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  Underwent OLT on 2020, post op coure c/b VRE bacteremia tx with linezolid and delirium likely in setting of CNI toxicity (FK discontinued/Everolimus initiated ). Recent admission from - for OM of the R heel, s/p debridement with Podiatry, and was discharged with Cefepime x 6 weeks via PICC line    Was also found with neutropenia this admission, Valcyte was reduced (was on 900mg daily CMV +/-).      Sent in from clinic after lab work revealed new transaminitis and MISA to 3.  Upon evaluation, pt c/o 24h of diarrhea and associated fatigue.  No recent travel or sick contacts.  R heel wound has been changed as instructed by wife without any new changes (including malodor, new purulence or worsening drainage). Has been compliant with all his transplant medication regimen, including ABX.    Denies fevers, chills, n/v, CP, SOB, extremity pain/edema, urinary changes or signs of bleeding   (14 Dec 2020 18:48)      PAST MEDICAL & SURGICAL HISTORY:  GIB (gastrointestinal bleeding)    GERD with esophagitis  Gastritis &amp; Non Bleeding Ulcers    Hepatic encephalopathy    Obesity    Fatty liver disease, nonalcoholic    Renal stones  25 years ago    Hypertension    Neuropathy    Hypercholesteremia    Diabetes    S/P cholecystectomy        MEDICATIONS  (STANDING):  aspirin  chewable 81 milliGRAM(s) Oral daily  cefepime   IVPB 2000 milliGRAM(s) IV Intermittent every 12 hours  dextrose 40% Gel 15 Gram(s) Oral once  dextrose 5%. 1000 milliLiter(s) (50 mL/Hr) IV Continuous <Continuous>  dextrose 5%. 1000 milliLiter(s) (100 mL/Hr) IV Continuous <Continuous>  dextrose 50% Injectable 25 Gram(s) IV Push once  dextrose 50% Injectable 12.5 Gram(s) IV Push once  dextrose 50% Injectable 25 Gram(s) IV Push once  everolimus (ZORTRESS) 2 milliGRAM(s) Oral <User Schedule>  glucagon  Injectable 1 milliGRAM(s) IntraMuscular once  influenza   Vaccine 0.5 milliLiter(s) IntraMuscular once  insulin glargine Injectable (LANTUS) 10 Unit(s) SubCutaneous at bedtime  insulin lispro (ADMELOG) corrective regimen sliding scale   SubCutaneous three times a day before meals  insulin lispro (ADMELOG) corrective regimen sliding scale   SubCutaneous at bedtime  magnesium oxide 800 milliGRAM(s) Oral daily  pantoprazole    Tablet 40 milliGRAM(s) Oral before breakfast  predniSONE   Tablet 5 milliGRAM(s) Oral daily  sodium chloride 0.9%. 1000 milliLiter(s) (100 mL/Hr) IV Continuous <Continuous>  tamsulosin 0.4 milliGRAM(s) Oral at bedtime  trimethoprim   80 mG/sulfamethoxazole 400 mG 1 Tablet(s) Oral daily  valGANciclovir 450 milliGRAM(s) Oral daily    MEDICATIONS  (PRN):      Allergies    codeine (Anaphylaxis)    Intolerances        VITALS:    Vital Signs Last 24 Hrs  T(C): 36.7 (15 Dec 2020 05:00), Max: 38.4 (15 Dec 2020 03:15)  T(F): 98.1 (15 Dec 2020 05:00), Max: 101.1 (15 Dec 2020 03:15)  HR: 107 (15 Dec 2020 05:00) (82 - 111)  BP: 116/62 (15 Dec 2020 05:00) (116/62 - 154/71)  BP(mean): --  RR: 18 (15 Dec 2020 05:00) (16 - 18)  SpO2: 98% (15 Dec 2020 05:00) (93% - 100%)    LABS:                          8.2    9.79  )-----------( 136      ( 15 Dec 2020 06:21 )             24.6       12-15    134<L>  |  103  |  54<H>  ----------------------------<  172<H>  4.3   |  16<L>  |  3.91<H>    Ca    8.8      15 Dec 2020 06:21  Phos  2.8     12-15  Mg     1.9     12-15    TPro  6.1  /  Alb  3.0<L>  /  TBili  0.3  /  DBili  x   /  AST  64<H>  /  ALT  83<H>  /  AlkPhos  177<H>  12-15      CAPILLARY BLOOD GLUCOSE      POCT Blood Glucose.: 172 mg/dL (15 Dec 2020 08:30)  POCT Blood Glucose.: 220 mg/dL (15 Dec 2020 01:56)  POCT Blood Glucose.: 209 mg/dL (14 Dec 2020 22:38)  POCT Blood Glucose.: 229 mg/dL (14 Dec 2020 22:20)      PT/INR - ( 15 Dec 2020 06:18 )   PT: 15.5 sec;   INR: 1.31 ratio         PTT - ( 15 Dec 2020 06:18 )  PTT:27.6 sec    LOWER EXTREMITY PHYSICAL EXAM:  Vasular: DP/PT 1/4 B/L, CFT <3 seconds B/L, Temperature gradient WNL B/L.   Neuro: Epicritic sensation diminished to the level of heel B/L.  Musculoskeletal/Ortho: unremarkable, no b/l LE edema  Skin:  Right heel fibrotic wound measuring 3.0x4.0x0.2cm with incorporating stravix graft with signs of wound contracture and healing; no drainage, no periwound erythema, no cellulitis, no purulence, no signs of acute infection    RADIOLOGY & ADDITIONAL STUDIES:

## 2020-12-15 NOTE — PROVIDER CONTACT NOTE (OTHER) - BACKGROUND
Pt is s/p OLT in 7/2020 post course c/b VRE bacteremia. Pt is s/p recent admission for RLE heel wound; pt d/c w/ R PICC and home Cefepime. Pt returns for elevated liver function outpatient

## 2020-12-15 NOTE — PROGRESS NOTE ADULT - ASSESSMENT
65M with PMHx of IDDM, HLD, obesity, HFpEF with mild LV diastolic dysfunction, MGUS, chronic anemia with a history of duodenal ulcer 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  Underwent OLT on 7/2/2020, post op coure c/b VRE bacteremia tx with linezolid and delirium likely in setting of CNI toxicity (FK discontinued/Everolimus initiated 7/27).    Recent admission from 11/20-12/2 for OM of the R heel, s/p debridement with Podiatry, and was discharged with Cefepime x 6 weeks via PICC line (placed 12/1).   Initial wound cx on admission grew Pseudomonas, OR cx grew staph epi.    Sent to ED from clinic with rising transaminitis and MISA (SCr 3.3 from 1.5)    [] Febrile, r/o Sepsis  - Recent h/o R heel OM, s/p debridement. Podiatry consulted: no signs of infection  - ID consulted: Vanco 1g x 1 dose, Cefepime broadened to Meropenem; f/u final recc's  - UA positive for large leuk est, blood, elevated WBC, urine cx pending, h/o emphysematous cystitis in the past  - CT a/p non contrast ordered  - Heart Murmur on exam: TTE ordered; scheduled for this afternoon  - Diarrhea: High risk for C diff (pending), CMV PCR with am labs (CMV +/-, valcyte dose reduced to 450mg daily last admission in setting of leukopenia)  - PICC line removed, tip sent for culture (placed 12/1/2020)  - bld/urine cxs pending    [] MISA   - SCr 3.6 (From 1.5 on discharge)  - Medication renally adjusted  - Urine electrolytes ordered  - Received NS bolus 500cc  - h/o L renal calculus on prior CT (0.7cm), pending non contrast CT a/p (protocoled and scheduled)    [] s/p OLT, now with transaminitis  - Everolimus 2mg bid, MMF on hold since last admission in setting of OM; Pred 5mg daily  - PPx: Valcyte/Bactrim  - Liver doppler today (protocoled and scheduled)     [] DM  - diabetic diet  - cont Lantus 10u qhs  - cont ISS

## 2020-12-15 NOTE — DISCHARGE NOTE PROVIDER - NSDCFUADDAPPT_GEN_ALL_CORE_FT
FOLLOW-UP:  1. Please call to make a follow-up appointment with Dr. Gray ( date  )  2. Please follow up with your primary care physician in one week regarding your hospitalization. FOLLOW-UP:  1. Please call to make a follow-up appointment at [facility] with Nephrology and Dr. Chris on Monday 1/4/2020.  Phone: [phone].   [address] [place], [place] [id number]  Please call the clinic upon arrival for your appointment. They will let you know when to come in.  This is to avoid multiple patients occupying the waiting area. Thank you!    2. Follow up with Dr. Carroll Wang within 1 week of discharge from the hospital. Call [phone] for appointment and discuss that you recently were seen in the hospital.     3.Please follow up with your primary care physician in one week regarding your hospitalization.   FOLLOW-UP:  1. You need to come to the [facility]plant clinic for lab work Thursday 12/31/2020. Clinic lab opens at 9:00AM.  Please call the clinic upon arrival. They will let you know when to come in.  This is to avoid multiple patients occupying the waiting area. Thank you!     1. Please call to make a follow-up appointment at the [facility] with Nephrology and Dr. Chris on Monday 1/4/2020.  Phone: [phone].   [address] [place] [address]  Please call the clinic upon arrival for your appointment. They will let you know when to come in.  This is to avoid multiple patients occupying the waiting area. Thank you!    2. Follow up with Dr. Carroll Wang within 1 week of discharge from the hospital. Call [phone] for appointment and discuss that you recently were seen in the hospital.     3.Please follow up with your primary care physician in one week regarding your hospitalization.

## 2020-12-15 NOTE — DISCHARGE NOTE PROVIDER - PROVIDER TOKENS
PROVIDER:[TOKEN:[[id number]]] PROVIDER:[TOKEN:[[id number]]],PROVIDER:[TOKEN:[[id number]]]

## 2020-12-15 NOTE — CONSULT NOTE ADULT - SUBJECTIVE AND OBJECTIVE BOX
Patient is a 65y old  Male who presents with a chief complaint of fatigue (15 Dec 2020 08:59)    HPI:  Transplant Surgery    Donor OLTx     2020    The patient is a 65 year old male with past medical history of OLT (CMV +/-), IDDM, hyperlipidemia, obesity, HFpEF with mild LV diastolic dysfunction, MGUS, chronic anemia with a history of duodenal ulcer as well as GAVE and duodenal AVM s/p APC (last on 10/11/19), decompensated JEAN/cirrhosis who underwent OLT on 2020, with post-op course complicated by VRE bacteremia that was treated with linezolid and delirium likely in setting of CNI toxicity (FK discontinued/Everolimus initiated ). Recent admission from - for osteomyelitis of the right heel, s/p debridement by podiatry, and was discharged with intravenous cefepime x 6 weeks via PICC line. He was also found to be neutropenic and had valcyte reduced. He was sent in from the clinic after lab work revealed new transaminitis and acute kidney injury. He was also complaining of 24 hours of diarrhea and associated fatigue. He denies any malodor, purulence, or worsening drainage. He denies fever, chills, nausea, vomiting, diarrhea, constipation, chest pain, dyspnea, urinary changes, or signs of bleeding. CBC showed low H/H with thrombocytopenia. CMP showed hyponatremia, elevated BUN/Cr, hypoalbuminemia, and hyperglycemia. Urinalysis showed proteinuria, hematuria, and UTI with WBCs > 50. COVID-19 PCR was negative. Prior wound cultures from last admission grew Pseudomonas aeruginosa and tissue culture growing S. epidermidis. CXR was clear without lung pathology. He was started on intravenous meropenem and intravenous bactrim. Urine cultures and blood cultures are pending. CMV PCR was ordered. CT abdomen and pelvis and abdominal ultrasound are pending.     prior hospital charts reviewed [x]  primary team notes reviewed [x]  other consultant notes reviewed [x]    PAST MEDICAL & SURGICAL HISTORY:  GIB (gastrointestinal bleeding)  GERD with esophagitis  Gastritis  Hepatic encephalopathy  Obesity  Fatty liver disease, nonalcoholic  Renal stones  Hypertension  Neuropathy  Hypercholesteremia  Diabetes  S/P cholecystectomy        Allergies  codeine (Anaphylaxis)    ANTIMICROBIALS (past 90 days)  MEDICATIONS  (STANDING):  cefepime   IVPB   100 mL/Hr IV Intermittent (20 @ 21:09)    vancomycin  IVPB   250 mL/Hr IV Intermittent (12-15-20 @ 08:30)        meropenem  IVPB 500 once  meropenem  IVPB 500 every 12 hours  meropenem  IVPB    trimethoprim   80 mG/sulfamethoxazole 400 mG 1 daily  valGANciclovir 450 <User Schedule>    OTHER MEDS: MEDICATIONS  (STANDING):  aspirin  chewable 81 daily  dextrose 40% Gel 15 once  dextrose 50% Injectable 25 once  dextrose 50% Injectable 12.5 once  dextrose 50% Injectable 25 once  everolimus (ZORTRESS) 2 <User Schedule>  glucagon  Injectable 1 once  influenza   Vaccine 0.5 once  insulin glargine Injectable (LANTUS) 10 at bedtime  insulin lispro (ADMELOG) corrective regimen sliding scale  three times a day before meals  insulin lispro (ADMELOG) corrective regimen sliding scale  at bedtime  pantoprazole    Tablet 40 before breakfast  predniSONE   Tablet 5 daily  tamsulosin 0.4 at bedtime    SOCIAL HISTORY:  Denies smoking, alcohol, or recreational drug use     FAMILY HISTORY:  Family history of type 2 diabetes mellitus  Family history of hypertension  Family history of stomach cancer      REVIEW OF SYSTEMS  [  ] ROS unobtainable because:    [x] All other systems negative except as noted below:	    Constitutional:  [ ] fever [ ] chills  [ ] weight loss  [ ] weakness  Skin:  [ ] rash [ ] phlebitis	  Eyes: [ ] icterus [ ] pain  [ ] discharge	  ENMT: [ ] sore throat  [ ] thrush [ ] ulcers [ ] exudates  Respiratory: [ ] dyspnea [ ] hemoptysis [ ] cough [ ] sputum	  Cardiovascular:  [ ] chest pain [ ] palpitations [ ] edema	  Gastrointestinal:  [ ] nausea [ ] vomiting [ ] diarrhea [ ] constipation [ ] pain	  Genitourinary:  [ ] dysuria [ ] frequency [ ] hematuria [ ] discharge [ ] flank pain  [ ] incontinence  Musculoskeletal:  [ ] myalgias [ ] arthralgias [ ] arthritis  [ ] back pain  Neurological:  [ ] headache [ ] seizures  [ ] confusion/altered mental status  Psychiatric:  [ ] anxiety [ ] depression	  Hematology/Lymphatics:  [ ] lymphadenopathy  Endocrine:  [ ] adrenal [ ] thyroid  Allergic/Immunologic:	 [ ] transplant [ ] seasonal    Vital Signs Last 24 Hrs  T(F): 98.3 (12-15-20 @ 09:00), Max: 101.1 (12-15-20 @ 03:15)  Vital Signs Last 24 Hrs  HR: 99 (12-15-20 @ 09:00) (82 - 111)  BP: 133/61 (12-15-20 @ 09:00) (116/62 - 154/71)  RR: 18 (12-15-20 @ 09:00)  SpO2: 97% (12-15-20 @ 09:00) (93% - 100%)  Wt(kg): --    PHYSICAL EXAM:  Constitutional: non-toxic, no distress  HEAD/EYES: anicteric, no conjunctival injection  ENT:  supple, no thrush  Cardiovascular:   normal S1, S2, no murmur, no edema  Respiratory:  clear BS bilaterally, no wheezes, no rales  GI:  soft, non-tender, normal bowel sounds  :  no bob, no CVA tenderness  Musculoskeletal:  no synovitis, normal ROM  Neurologic: awake and alert, normal strength, no focal findings  Skin:  no rash, no erythema, no phlebitis  Heme/Onc: no lymphadenopathy   Psychiatric:  awake, alert, appropriate mood                            8.2    9.79  )-----------( 136      ( 15 Dec 2020 06:21 )             24.6   12-15    134<L>  |  103  |  54<H>  ----------------------------<  172<H>  4.3   |  16<L>  |  3.91<H>    Ca    8.8      15 Dec 2020 06:21  Phos  2.8     12-15  Mg     1.9     -15    TPro  6.1  /  Alb  3.0<L>  /  TBili  0.3  /  DBili  x   /  AST  64<H>  /  ALT  83<H>  /  AlkPhos  177<H>  12-15    Urinalysis Basic - ( 15 Dec 2020 03:55 )    Color: Yellow / Appearance: Slightly Turbid / S.013 / pH: x  Gluc: x / Ketone: Negative  / Bili: Negative / Urobili: Negative   Blood: x / Protein: 300 mg/dL / Nitrite: Negative   Leuk Esterase: Large / RBC: 7 /hpf /  /HPF   Sq Epi: x / Non Sq Epi: 0 /hpf / Bacteria: Negative    MICROBIOLOGY:    Urine cultures and blood cultures are pending.    CMV PCR ordered.         RADIOLOGY:    r< from: Xray Chest 1 View- PORTABLE-Urgent (Xray Chest 1 View- PORTABLE-Urgent .) (12.15.20 @ 03:05) >  PROCEDURE DATE:  12/15/2020            INTERPRETATION:  A single chest x-ray was obtained on December 15 2020.    PICC line placement on the right.    Impression:    The heart is normal in size. The lungs are clear. No pleural effusion. No pneumothorax. A PICC line was placed on the right and the tip is in the superior vena cava.    < end of copied text >    imaging below personally reviewed and agree with findings Patient is a 65y old  Male who presents with a chief complaint of fatigue (15 Dec 2020 08:59)    HPI:  Transplant Surgery    Donor OLTx     2020    The patient is a 65 year old male with past medical history of OLT (CMV +/-), IDDM, hyperlipidemia, obesity, HFpEF with mild LV diastolic dysfunction, MGUS, chronic anemia with a history of duodenal ulcer as well as GAVE and duodenal AVM s/p APC (last on 10/11/19), decompensated JEAN/cirrhosis who underwent OLT on 2020, with post-op course complicated by VRE bacteremia that was treated with linezolid and delirium likely in setting of CNI toxicity (FK discontinued/Everolimus initiated ). Recent admission from - for osteomyelitis of the right heel, s/p debridement by podiatry, and was discharged with intravenous cefepime x 6 weeks via PICC line. He was also found to be neutropenic and had valcyte reduced. He was sent in from the clinic after lab work revealed new transaminitis and acute kidney injury. He was also complaining of 24 hours of diarrhea and associated fatigue. He denies any malodor, purulence, or worsening drainage. He denies fever, chills, nausea, vomiting, diarrhea, constipation, chest pain, dyspnea, urinary changes, or signs of bleeding. CBC showed low H/H with thrombocytopenia. CMP showed hyponatremia, elevated BUN/Cr, hypoalbuminemia, and hyperglycemia. Urinalysis showed proteinuria, hematuria, and UTI with WBCs > 50. COVID-19 PCR was negative. Prior wound cultures from last admission grew Pseudomonas aeruginosa and tissue culture growing S. epidermidis. CXR was clear without lung pathology. He was started on intravenous meropenem and intravenous bactrim. Urine cultures and blood cultures are pending. CMV PCR was ordered. CT abdomen and pelvis and abdominal ultrasound are pending.     prior hospital charts reviewed [x]  primary team notes reviewed [x]  other consultant notes reviewed [x]    PAST MEDICAL & SURGICAL HISTORY:  GIB (gastrointestinal bleeding)  GERD with esophagitis  Gastritis  Hepatic encephalopathy  Obesity  Fatty liver disease, nonalcoholic  Renal stones  Hypertension  Neuropathy  Hypercholesteremia  Diabetes  S/P cholecystectomy        Allergies  codeine (Anaphylaxis)    ANTIMICROBIALS (past 90 days)  MEDICATIONS  (STANDING):  cefepime   IVPB   100 mL/Hr IV Intermittent (20 @ 21:09)    vancomycin  IVPB   250 mL/Hr IV Intermittent (12-15-20 @ 08:30)        meropenem  IVPB 500 once  meropenem  IVPB 500 every 12 hours  meropenem  IVPB    trimethoprim   80 mG/sulfamethoxazole 400 mG 1 daily  valGANciclovir 450 <User Schedule>    OTHER MEDS: MEDICATIONS  (STANDING):  aspirin  chewable 81 daily  dextrose 40% Gel 15 once  dextrose 50% Injectable 25 once  dextrose 50% Injectable 12.5 once  dextrose 50% Injectable 25 once  everolimus (ZORTRESS) 2 <User Schedule>  glucagon  Injectable 1 once  influenza   Vaccine 0.5 once  insulin glargine Injectable (LANTUS) 10 at bedtime  insulin lispro (ADMELOG) corrective regimen sliding scale  three times a day before meals  insulin lispro (ADMELOG) corrective regimen sliding scale  at bedtime  pantoprazole    Tablet 40 before breakfast  predniSONE   Tablet 5 daily  tamsulosin 0.4 at bedtime    SOCIAL HISTORY:  Denies smoking, alcohol, or recreational drug use     FAMILY HISTORY:  Family history of type 2 diabetes mellitus  Family history of hypertension  Family history of stomach cancer      REVIEW OF SYSTEMS  [  ] ROS unobtainable because:    [x] All other systems negative except as noted below:	    Constitutional:  [x] fever [ ] chills  [ ] weight loss  [x] weakness  Skin:  [ ] rash [ ] phlebitis	  Eyes: [ ] icterus [ ] pain  [ ] discharge	  ENMT: [ ] sore throat  [ ] thrush [ ] ulcers [ ] exudates  Respiratory: [ ] dyspnea [ ] hemoptysis [ ] cough [ ] sputum	  Cardiovascular:  [ ] chest pain [ ] palpitations [ ] edema	  Gastrointestinal:  [ ] nausea [x] vomiting [x] diarrhea [ ] constipation [ ] pain	  Genitourinary:  [ ] dysuria [ ] frequency [ ] hematuria [ ] discharge [ ] flank pain  [ ] incontinence  Musculoskeletal:  [ ] myalgias [ ] arthralgias [ ] arthritis  [ ] back pain  Neurological:  [ ] headache [ ] seizures  [ ] confusion/altered mental status  Psychiatric:  [ ] anxiety [ ] depression	  Hematology/Lymphatics:  [ ] lymphadenopathy  Endocrine:  [ ] adrenal [ ] thyroid  Allergic/Immunologic:	 [ ] transplant [ ] seasonal    Vital Signs Last 24 Hrs  T(F): 98.3 (12-15-20 @ 09:00), Max: 101.1 (12-15-20 @ 03:15)  Vital Signs Last 24 Hrs  HR: 99 (12-15-20 @ 09:00) (82 - 111)  BP: 133/61 (12-15-20 @ 09:00) (116/62 - 154/71)  RR: 18 (12-15-20 @ 09:00)  SpO2: 97% (12-15-20 @ 09:00) (93% - 100%)  Wt(kg): --    PHYSICAL EXAM:  Constitutional: non-toxic, no distress  HEAD/EYES: anicteric, no conjunctival injection  ENT:  supple, no thrush  Cardiovascular:   normal S1, S2, no murmur, no edema  Respiratory:  clear BS bilaterally, no wheezes, no rales  GI:  soft, non-tender, normal bowel sounds  :  no bob, no CVA tenderness  Musculoskeletal:  no synovitis, normal ROM  Neurologic: awake and alert, normal strength, no focal findings  Skin:  no rash, no erythema, no phlebitis, + right lower extremity fibrotic wound without purulence, edema, or erythema   Heme/Onc: no lymphadenopathy   Psychiatric:  awake, alert, appropriate mood                            8.2    9.79  )-----------( 136      ( 15 Dec 2020 06:21 )             24.6   12-15    134<L>  |  103  |  54<H>  ----------------------------<  172<H>  4.3   |  16<L>  |  3.91<H>    Ca    8.8      15 Dec 2020 06:21  Phos  2.8     12-15  Mg     1.9     12-15    TPro  6.1  /  Alb  3.0<L>  /  TBili  0.3  /  DBili  x   /  AST  64<H>  /  ALT  83<H>  /  AlkPhos  177<H>  12-15    Urinalysis Basic - ( 15 Dec 2020 03:55 )    Color: Yellow / Appearance: Slightly Turbid / S.013 / pH: x  Gluc: x / Ketone: Negative  / Bili: Negative / Urobili: Negative   Blood: x / Protein: 300 mg/dL / Nitrite: Negative   Leuk Esterase: Large / RBC: 7 /hpf /  /HPF   Sq Epi: x / Non Sq Epi: 0 /hpf / Bacteria: Negative    MICROBIOLOGY:    Urine cultures and blood cultures are pending.    CMV PCR ordered.         RADIOLOGY:    r< from: Xray Chest 1 View- PORTABLE-Urgent (Xray Chest 1 View- PORTABLE-Urgent .) (12.15.20 @ 03:05) >  PROCEDURE DATE:  12/15/2020            INTERPRETATION:  A single chest x-ray was obtained on December 15 2020.    PICC line placement on the right.    Impression:    The heart is normal in size. The lungs are clear. No pleural effusion. No pneumothorax. A PICC line was placed on the right and the tip is in the superior vena cava.    < end of copied text >    imaging below personally reviewed and agree with findings Patient is a 65y old  Male who presents with a chief complaint of fatigue (15 Dec 2020 08:59)    HPI:  Transplant Surgery    Donor OLTx     2020    The patient is a 65 year old male with past medical history of OLT (CMV +/-), IDDM, hyperlipidemia, obesity, HFpEF with mild LV diastolic dysfunction, MGUS, chronic anemia with a history of duodenal ulcer as well as GAVE and duodenal AVM s/p APC (last on 10/11/19), decompensated JEAN/cirrhosis who underwent OLT on 2020, with post-op course complicated by VRE bacteremia that was treated with linezolid and delirium likely in setting of CNI toxicity (FK discontinued/Everolimus initiated ). Recent admission from - for osteomyelitis of the right heel, s/p debridement by podiatry, and was discharged with intravenous cefepime x 6 weeks via PICC line. He was also found to be neutropenic and had valcyte reduced. He was sent in from the clinic after lab work revealed new transaminitis and acute kidney injury. He was also complaining of 24 hours of diarrhea and associated fatigue. He denies any malodor, purulence, or worsening drainage. He denies fever, chills, nausea, vomiting, diarrhea, constipation, chest pain, dyspnea, urinary changes, or signs of bleeding. CBC showed low H/H with thrombocytopenia. CMP showed hyponatremia, elevated BUN/Cr, hypoalbuminemia, and hyperglycemia. Urinalysis showed proteinuria, hematuria, and UTI with WBCs > 50. COVID-19 PCR was negative. Prior wound cultures from last admission grew Pseudomonas aeruginosa and tissue culture growing S. epidermidis. CXR was clear without lung pathology. He was started on intravenous meropenem and intravenous bactrim. Urine cultures and blood cultures are pending. CMV PCR was ordered. CT abdomen and pelvis and abdominal ultrasound are pending.     prior hospital charts reviewed [x]  primary team notes reviewed [x]  other consultant notes reviewed [x]    PAST MEDICAL & SURGICAL HISTORY:  GIB (gastrointestinal bleeding)  GERD with esophagitis  Gastritis  Hepatic encephalopathy  Obesity  Fatty liver disease, nonalcoholic  Renal stones  Hypertension  Neuropathy  Hypercholesteremia  Diabetes  S/P cholecystectomy        Allergies  codeine (Anaphylaxis)    ANTIMICROBIALS (past 90 days)  MEDICATIONS  (STANDING):  cefepime   IVPB   100 mL/Hr IV Intermittent (20 @ 21:09)    vancomycin  IVPB   250 mL/Hr IV Intermittent (12-15-20 @ 08:30)        meropenem  IVPB 500 once  meropenem  IVPB 500 every 12 hours  meropenem  IVPB    trimethoprim   80 mG/sulfamethoxazole 400 mG 1 daily  valGANciclovir 450 <User Schedule>    OTHER MEDS: MEDICATIONS  (STANDING):  aspirin  chewable 81 daily  dextrose 40% Gel 15 once  dextrose 50% Injectable 25 once  dextrose 50% Injectable 12.5 once  dextrose 50% Injectable 25 once  everolimus (ZORTRESS) 2 <User Schedule>  glucagon  Injectable 1 once  influenza   Vaccine 0.5 once  insulin glargine Injectable (LANTUS) 10 at bedtime  insulin lispro (ADMELOG) corrective regimen sliding scale  three times a day before meals  insulin lispro (ADMELOG) corrective regimen sliding scale  at bedtime  pantoprazole    Tablet 40 before breakfast  predniSONE   Tablet 5 daily  tamsulosin 0.4 at bedtime    SOCIAL HISTORY:  Denies smoking, alcohol, or recreational drug use     FAMILY HISTORY:  Family history of type 2 diabetes mellitus  Family history of hypertension  Family history of stomach cancer      REVIEW OF SYSTEMS  [  ] ROS unobtainable because:    [x] All other systems negative except as noted below:	    Constitutional:  [x] fever [ ] chills  [ ] weight loss  [x] weakness  Skin:  [ ] rash [ ] phlebitis	  Eyes: [ ] icterus [ ] pain  [ ] discharge	  ENMT: [ ] sore throat  [ ] thrush [ ] ulcers [ ] exudates  Respiratory: [ ] dyspnea [ ] hemoptysis [ ] cough [ ] sputum	  Cardiovascular:  [ ] chest pain [ ] palpitations [ ] edema	  Gastrointestinal:  [ ] nausea [x] vomiting [x] diarrhea [ ] constipation [ ] pain	  Genitourinary:  [ ] dysuria [ ] frequency [ ] hematuria [ ] discharge [ ] flank pain  [ ] incontinence  Musculoskeletal:  [ ] myalgias [ ] arthralgias [ ] arthritis  [ ] back pain  Neurological:  [ ] headache [ ] seizures  [ ] confusion/altered mental status  Psychiatric:  [ ] anxiety [ ] depression	  Hematology/Lymphatics:  [ ] lymphadenopathy  Endocrine:  [ ] adrenal [ ] thyroid  Allergic/Immunologic:	 [ ] transplant [ ] seasonal    Vital Signs Last 24 Hrs  T(F): 98.3 (12-15-20 @ 09:00), Max: 101.1 (12-15-20 @ 03:15)  Vital Signs Last 24 Hrs  HR: 99 (12-15-20 @ 09:00) (82 - 111)  BP: 133/61 (12-15-20 @ 09:00) (116/62 - 154/71)  RR: 18 (12-15-20 @ 09:00)  SpO2: 97% (12-15-20 @ 09:00) (93% - 100%)  Wt(kg): --    PHYSICAL EXAM:  Constitutional: non-toxic, no distress  HEAD/EYES: anicteric, no conjunctival injection  ENT:  supple, no thrush  Cardiovascular:   normal S1, S2, no murmur, no edema  Respiratory:  clear BS bilaterally, no wheezes, no rales  GI:  soft, non-tender, normal bowel sounds  :  no bob, no CVA tenderness  Musculoskeletal:  no synovitis, normal ROM  Neurologic: awake and alert, normal strength, no focal findings  Skin:  no rash, no erythema, no phlebitis, + right lower extremity fibrotic wound without purulence, edema, or erythema   Heme/Onc: no lymphadenopathy   Psychiatric:  awake, alert, appropriate mood                            8.2    9.79  )-----------( 136      ( 15 Dec 2020 06:21 )             24.6   12-15    134<L>  |  103  |  54<H>  ----------------------------<  172<H>  4.3   |  16<L>  |  3.91<H>    Ca    8.8      15 Dec 2020 06:21  Phos  2.8     12-15  Mg     1.9     12-15    TPro  6.1  /  Alb  3.0<L>  /  TBili  0.3  /  DBili  x   /  AST  64<H>  /  ALT  83<H>  /  AlkPhos  177<H>  12-15    Urinalysis Basic - ( 15 Dec 2020 03:55 )    Color: Yellow / Appearance: Slightly Turbid / S.013 / pH: x  Gluc: x / Ketone: Negative  / Bili: Negative / Urobili: Negative   Blood: x / Protein: 300 mg/dL / Nitrite: Negative   Leuk Esterase: Large / RBC: 7 /hpf /  /HPF   Sq Epi: x / Non Sq Epi: 0 /hpf / Bacteria: Negative    MICROBIOLOGY:    Urine cultures and blood cultures are pending.    CMV PCR ordered.         RADIOLOGY:    <The imaging below has been reviewed and visualized by me independently. Findings as detailed in report below>    < from: Xray Chest 1 View- PORTABLE-Urgent (Xray Chest 1 View- PORTABLE-Urgent .) (12.15.20 @ 03:05) >  PROCEDURE DATE:  12/15/2020            INTERPRETATION:  A single chest x-ray was obtained on December 15 2020.    PICC line placement on the right.    Impression:    The heart is normal in size. The lungs are clear. No pleural effusion. No pneumothorax. A PICC line was placed on the right and the tip is in the superior vena cava.    < end of copied text >    imaging below personally reviewed and agree with findings

## 2020-12-15 NOTE — DISCHARGE NOTE PROVIDER - HOSPITAL COURSE
65M with PMHx of IDDM, HLD, obesity, HFpEF with mild LV diastolic dysfunction, MGUS, chronic anemia with a history of duodenal ulcer 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  Underwent OLT on 7/2/2020, post op course c/b VRE bacteremia tx with linezolid and delirium likely in setting of CNI toxicity (FK discontinued/Everolimus initiated 7/27). Recent admission from 11/20-12/2 for OM of the R heel, s/p debridement with Podiatry, and was discharged with Cefepime x 6 weeks via PICC line (placed 12/1). Initial wound cx on admission grew Pseudomonas, OR cx grew staph epi. Was also found with neutropenia and Valcyte was reduced (was on 900mg daily CMV +/-).  Readmitted on 12/14/20 with rising transaminitis and MISA (SCr 3.3 from 1.5). Hospital course c/b C diff colitis and worsening renal function requiring HD x 2 (12/19 and 12/22), on antibiotics for OM x 6 weeks and C-diff end date 1/5/20.       Pt evaluated by the disciplinary team including nephrologist, pharmacist, nutrition, social work, NP, and surgeon daily where plan of care reviewed and discussed.  Now pt doing well clear for discharge home and close follow up at transplant clinic.      On discharge Immuno:  Everolimus -------, MMF held, pred 5mg QD    Abx: PO vancomycin 125 BID end date 1/5/2020     65M with PMHx of IDDM, HLD, obesity, HFpEF with mild LV diastolic dysfunction, MGUS, chronic anemia with a history of duodenal ulcer 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  Underwent OLT on 7/2/2020, post op course c/b VRE bacteremia tx with linezolid and delirium likely in setting of CNI toxicity (FK discontinued/Everolimus initiated 7/27). Recent admission from 11/20-12/2 for OM of the R heel, s/p debridement with Podiatry, and was discharged with Cefepime x 6 weeks via PICC line (placed 12/1) end date of antibiotic regimen was 1/5.  Initial wound cx on admission grew Pseudomonas, OR cx grew staph epi. Was also found with neutropenia and Valcyte was reduced (was on 900mg daily CMV +/-).      Readmitted on 12/14/20 with transaminitis, fevers and MISA (SCr 3.3 from 1.5). Hospital course c/b C diff colitis and worsening renal function requiring HD x 2 (12/19 and 12/22).   [] Transaminitis   - liver function remained stable  - liver doppler showed good perfusion  - Transaminitis resolved    [] Fevers  - Infectious disease consulted  - Seen and examined by podiatry, R heel wound healing well, no signs of infection  - Blood/urine cx remained negative  - Imaging (CT c/a/p) shoed no obvious source of infection  - Cefepime changed to Meropenem; completed course 12/30    [] C diff colitis  - patient presented with diarrhea, tested positive for C diff  - PO vancomycin initiated on 12/15; diarrhea resolved  - Plan to continue vancomycin until 1/2    [] MISA  - admitted with MISA (baseline creatinine ~1.5, creatinine on admission 3.3  - renal functioned worsened c/b uremic encephalopathy necessitating transfer to ICU, emergent shiley placement and HD inititation on 12/19 and 12/22.  - Renal function improved; SCr on discharge _________________; good urine output     Pt evaluated by the multidisciplinary team including transplant surgeon, nephrologist, pharmacist, nutrition, social work, NP and deemed veronica for discharge with close follow up.       65M with PMHx of IDDM, HLD, obesity, HFpEF with mild LV diastolic dysfunction, MGUS, chronic anemia with a history of duodenal ulcer 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  Underwent OLT on 7/2/2020, post op course c/b VRE bacteremia tx with linezolid and delirium likely in setting of CNI toxicity (FK discontinued/Everolimus initiated 7/27). Recent admission from 11/20-12/2 for OM of the R heel, s/p debridement with Podiatry, and was discharged with Cefepime x 6 weeks via PICC line (placed 12/1) end date of antibiotic regimen was 1/5.  Initial wound cx on admission grew Pseudomonas, OR cx grew staph epi. Was also found with neutropenia and Valcyte was reduced (was on 900mg daily CMV +/-).      Readmitted on 12/14/20 with transaminitis, fevers and MISA (SCr 3.3 from 1.5). Hospital course c/b C diff colitis and worsening renal function requiring HD x 2 (12/19 and 12/22).   [] Transaminitis   - liver function remained stable  - liver doppler showed good perfusion  - Transaminitis resolved    [] Fevers  - Infectious disease consulted  - Seen and examined by podiatry, R heel wound healing well, no signs of infection  - Blood/urine cx remained negative  - Imaging (CT c/a/p) showed no obvious source of infection  - Cefepime changed to Meropenem; completed course 12/30    [] C diff colitis  - patient presented with diarrhea, tested positive for C diff  - PO vancomycin initiated on 12/15; diarrhea resolved  - Plan to continue vancomycin until 1/2    [] MISA  - admitted with MISA (baseline creatinine ~1.5, creatinine on admission 3.3 (peak 6.94)  - renal functioned worsened c/b uremic encephalopathy necessitating transfer to ICU, emergent shiley placement and HD inititation on 12/19 and 12/22.  - Renal function improving; BUN/SCr on discharge was 70/3.61; good urine output. He received Albumin 25% 200ml prior to DC    Pt evaluated by the multidisciplinary team including transplant surgeon, nephrologist, pharmacist, nutrition, social work, NP and deemed stable for discharge.    He was to obtain lab work on 12/31/2020 with plan for FU in clinic on Monday 1/4.

## 2020-12-15 NOTE — DISCHARGE NOTE PROVIDER - NSDCCPCAREPLAN_GEN_ALL_CORE_FT
PRINCIPAL DISCHARGE DIAGNOSIS  Diagnosis: Transaminitis  Assessment and Plan of Treatment: Resolved, follow up with hepatology and transplant surgery in the clinic      SECONDARY DISCHARGE DIAGNOSES  Diagnosis: Liver transplant recipient  Assessment and Plan of Treatment: No heavy lifting anything more than 10-15lbs or straining. Otherwise, you may return to your usual level of physical activity. If you are taking narcotic pain medication (such as Percocet), do NOT drive a car, operate machinery or make important decisions.  Call trnansplant clinic If you developed any of the following, fever, pain, redness, swelling at incision site, cough, nausea, vomiting, painful urination, difficulty urination, or not making any urine.  NOTIFY YOUR SURGEON IF: You have any bleeding that does not stop, any pus draining from your wound, any fever (over 100.4 F) or chills, persistent nausea/vomiting with inability to tolerate food or liquids, persistent diarrhea, or if your pain is not controlled on your discharge pain medications.  Immunosuppression:   Keep away from people who have cough, cold, and symptom of flu.  Only take medications that are on your discharge list  If you missed your medications call the transplant office, do not double up medication because you missed a dose.  If you have any question regarding your medication please call transplant office.    Diagnosis: Diabetes  Assessment and Plan of Treatment: HgA1C this admission.  Make sure you get your HgA1c checked every three months.  If you take oral diabetes medications, check your blood glucose two times a day.  If you take insulin, check your blood glucose before meals and at bedtime.  It's important not to skip any meals.  Keep a log of your blood glucose results and always take it with you to your doctor appointments.  Keep a list of your current medications including injectables and over the counter medications and bring this medication list with you to all your doctor appointments.  If you have not seen your ophthalmologist this year call for appointment.  Check your feet daily for redness, sores, or openings. Do not self treat. If no improvement in two days call your primary care physician for an appointment.  Low blood sugar (hypoglycemia) is a blood sugar below 70mg/dl. Check your blood sugar if you feel signs/symptoms of hypoglycemia. If your blood sugar is below 70 take 15 grams of carbohydrates (ex 4 oz of apple juice, 3-4 glucose tablets, or 4-6 oz of regular soda) wait 15 minutes and repeat blood sugar to make sure it comes up above 70.  If your blood sugar is above 70 and you are due for a meal, have a meal.  If you are not due for a meal have a snack.  This snack helps keeps your blood sugar at a safe range.    Diagnosis: MISA (acute kidney injury)  Assessment and Plan of Treatment: Avoid taking (NSAIDs) - (ex: Ibuprofen, Advil, Celebrex, Naprosyn)  Avoid taking any nephrotoxic agents (can harm kidneys) - Intravenous contrast for diagnostic testing, combination cold medications.  Have all medications adjusted for your renal function by your Health Care Provider.  Blood pressure control is important.  Take all medication as prescribed.     PRINCIPAL DISCHARGE DIAGNOSIS  Diagnosis: Transaminitis  Assessment and Plan of Treatment: Resolved, follow up with hepatology and transplant surgery in the clinic      SECONDARY DISCHARGE DIAGNOSES  Diagnosis: Diabetes  Assessment and Plan of Treatment: HgA1C this admission.  Make sure you get your HgA1c checked every three months.  If you take oral diabetes medications, check your blood glucose two times a day.  If you take insulin, check your blood glucose before meals and at bedtime.  It's important not to skip any meals.  Keep a log of your blood glucose results and always take it with you to your doctor appointments.  Keep a list of your current medications including injectables and over the counter medications and bring this medication list with you to all your doctor appointments.  If you have not seen your ophthalmologist this year call for appointment.  Check your feet daily for redness, sores, or openings. Do not self treat. If no improvement in two days call your primary care physician for an appointment.  Low blood sugar (hypoglycemia) is a blood sugar below 70mg/dl. Check your blood sugar if you feel signs/symptoms of hypoglycemia. If your blood sugar is below 70 take 15 grams of carbohydrates (ex 4 oz of apple juice, 3-4 glucose tablets, or 4-6 oz of regular soda) wait 15 minutes and repeat blood sugar to make sure it comes up above 70.  If your blood sugar is above 70 and you are due for a meal, have a meal.  If you are not due for a meal have a snack.  This snack helps keeps your blood sugar at a safe range.    Diagnosis: Liver transplant recipient  Assessment and Plan of Treatment: No heavy lifting anything more than 10-15lbs or straining. Otherwise, you may return to your usual level of physical activity. If you are taking narcotic pain medication (such as Percocet), do NOT drive a car, operate machinery or make important decisions.  Call trnansplant clinic If you developed any of the following, fever, pain, redness, swelling at incision site, cough, nausea, vomiting, painful urination, difficulty urination, or not making any urine.  NOTIFY YOUR SURGEON IF: You have any bleeding that does not stop, any pus draining from your wound, any fever (over 100.4 F) or chills, persistent nausea/vomiting with inability to tolerate food or liquids, persistent diarrhea, or if your pain is not controlled on your discharge pain medications.  Immunosuppression:   Keep away from people who have cough, cold, and symptom of flu.  Only take medications that are on your discharge list  If you missed your medications call the transplant office, do not double up medication because you missed a dose.  If you have any question regarding your medication please call transplant office.    Diagnosis: MISA (acute kidney injury)  Assessment and Plan of Treatment: Avoid taking (NSAIDs) - (ex: Ibuprofen, Advil, Celebrex, Naprosyn)  Avoid taking any nephrotoxic agents (can harm kidneys) - Intravenous contrast for diagnostic testing, combination cold medications.  Have all medications adjusted for your renal function by your Health Care Provider.  Blood pressure control is important.  Take all medication as prescribed.     PRINCIPAL DISCHARGE DIAGNOSIS  Diagnosis: Transaminitis  Assessment and Plan of Treatment: Resolved, follow up with hepatology and transplant surgery in the clinic      SECONDARY DISCHARGE DIAGNOSES  Diagnosis: Diabetes  Assessment and Plan of Treatment: HgA1C this admission.  Make sure you get your HgA1c checked every three months.  If you take oral diabetes medications, check your blood glucose two times a day.  If you take insulin, check your blood glucose before meals and at bedtime.  It's important not to skip any meals.  Keep a log of your blood glucose results and always take it with you to your doctor appointments.  Keep a list of your current medications including injectables and over the counter medications and bring this medication list with you to all your doctor appointments.  If you have not seen your ophthalmologist this year call for appointment.  Check your feet daily for redness, sores, or openings. Do not self treat. If no improvement in two days call your primary care physician for an appointment.  Low blood sugar (hypoglycemia) is a blood sugar below 70mg/dl. Check your blood sugar if you feel signs/symptoms of hypoglycemia. If your blood sugar is below 70 take 15 grams of carbohydrates (ex 4 oz of apple juice, 3-4 glucose tablets, or 4-6 oz of regular soda) wait 15 minutes and repeat blood sugar to make sure it comes up above 70.  If your blood sugar is above 70 and you are due for a meal, have a meal.  If you are not due for a meal have a snack.  This snack helps keeps your blood sugar at a safe range.    Diagnosis: Liver transplant recipient  Assessment and Plan of Treatment: No heavy lifting anything more than 10-15lbs or straining. Otherwise, you may return to your usual level of physical activity. If you are taking narcotic pain medication (such as Percocet), do NOT drive a car, operate machinery or make important decisions.  Call trnansplant clinic If you developed any of the following, fever, pain, redness, swelling at incision site, cough, nausea, vomiting, painful urination, difficulty urination, or not making any urine.  NOTIFY YOUR SURGEON IF: You have any bleeding that does not stop, any pus draining from your wound, any fever (over 100.4 F) or chills, persistent nausea/vomiting with inability to tolerate food or liquids, persistent diarrhea, or if your pain is not controlled on your discharge pain medications.  Immunosuppression:   Keep away from people who have cough, cold, and symptom of flu.  Only take medications that are on your discharge list  If you missed your medications call the transplant office, do not double up medication because you missed a dose.  If you have any question regarding your medication please call transplant office.    Diagnosis: MISA (acute kidney injury)  Assessment and Plan of Treatment: Avoid taking (NSAIDs) - (ex: Ibuprofen, Advil, Celebrex, Naprosyn)  Avoid taking any nephrotoxic agents (can harm kidneys) - Intravenous contrast for diagnostic testing, combination cold medications.  Have all medications adjusted for your renal function by your Health Care Provider.  Blood pressure control is important.  Take all medication as prescribed.  - You need to come to clinic tomorrow 12/31/20 for lab work. Clinic Lab opens at 9AM

## 2020-12-15 NOTE — DISCHARGE NOTE PROVIDER - NSDCMRMEDTOKEN_GEN_ALL_CORE_FT
aspirin 81 mg oral delayed release tablet: 1 tab(s) orally once a day  cefepime 2 g intravenous injection: 2 gram(s) intravenous every 8 hours  everolimus 1 mg oral tablet: 2 tab(s) orally   furosemide 20 mg oral tablet: 1 tab(s) orally once a day  HumaLOG KwikPen 100 units/mL injectable solution: 10 unit(s) injectable 3 times a day (before meals)  insulin glargine: 10 unit(s) subcutaneous once a day (at bedtime)  magnesium oxide 400 mg (241.3 mg elemental magnesium) oral tablet: 2 tab(s) orally once a day (before a meal)  nystatin 100,000 units/mL oral suspension: 5 milliliter(s) orally 4 times a day  pantoprazole 40 mg oral delayed release tablet: 1 tab(s) orally once a day (before a meal)  predniSONE 5 mg oral tablet: 1 tab(s) orally once a day  sulfamethoxazole-trimethoprim 400 mg-80 mg oral tablet: 1 tab(s) orally once a day  tamsulosin 0.4 mg oral capsule: 1 cap(s) orally once a day (at bedtime)  valGANciclovir 450 mg oral tablet: 1 tab(s) orally once a day   aspirin 81 mg oral delayed release tablet: 1 tab(s) orally once a day  everolimus 1 mg oral tablet: 3 tab(s) orally 2 times a day   (8AM and 8PM)  HumaLOG KwikPen 100 units/mL injectable solution: 10 unit(s) injectable 3 times a day (before meals)  insulin glargine: 18 unit(s) subcutaneous once a day (at bedtime)  magnesium oxide 400 mg (241.3 mg elemental magnesium) oral tablet: 1 tab(s) orally once a day (before a meal)  NIFEdipine 30 mg oral tablet, extended release: 1 tab(s) orally once a day  pantoprazole 40 mg oral delayed release tablet: 1 tab(s) orally once a day (before a meal)  predniSONE 5 mg oral tablet: 1 tab(s) orally once a day  tamsulosin 0.4 mg oral capsule: 1 cap(s) orally once a day (at bedtime)  valGANciclovir 450 mg oral tablet: 1 tab(s) orally every Monday, Wednesday, and Friday  vancomycin 50 mg/mL oral liquid: 2.5 milliliter(s) orally 2 times a day

## 2020-12-15 NOTE — DISCHARGE NOTE PROVIDER - NSDCFUADDINST_GEN_ALL_CORE_FT
Podiatry Discharge Instructions:  Follow up: Please follow up with Dr. Carroll Wang within 1 week of discharge from the hospital, please call [phone] for appointment and discuss that you recently were seen in the hospital.  Wound Care: Please make daily dressing change to right heel wound with 4x4 gauze, ABD pad and Shahnaz.  Weight bearing: Please wear b/l off loading Z Flow boots when bed bound.  Antibiotics: Please continue as instructed.

## 2020-12-15 NOTE — CONSULT NOTE ADULT - SUBJECTIVE AND OBJECTIVE BOX
[facility] DIVISION OF KIDNEY DISEASES AND HYPERTENSION -- INITIAL CONSULT NOTE  --------------------------------------------------------------------------------  Authored by: Adele Rowland   Cell # [phone]     HPI: 65 yr old man with history of JEAN/Cirrhosis 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 on immunosuppression with Everolimus and CellCept. He is sent in from clinic for lab work showing MISA and new transaminitis.     PMH: DM on insulin, hyperlipidemia, mild diastolic dysfunction, MGUS, chronic anemia, duodenal ulcer.   Post liver transplant he was unable to tolerate CNI due to neurotoxicity/delerium and was swtiched to Everolimus.   He has non healing left heal ulcer that was recently complicated by osteomyelitis s/p debridement by podiatry, he was on IV cefepime x 6 week course of which he completed 2 weeks.     Pt reports feeling tired/fatigued for the past few days. He had intermittent diarrhea over the past 3 days.   No Nausea or vomiting. No dysuria or hematuria. Did notice reduced UOP.   No new drainage from the foot. Has pain when foot is manipulated but no pain at rest.   Febrile to 101 overnight, -130 systolic. UOP 400ml overnight.           PAST HISTORY  --------------------------------------------------------------------------------  PAST MEDICAL & SURGICAL HISTORY:  GIB (gastrointestinal bleeding)  GERD with esophagitis  Gastritis &amp; Non Bleeding Ulcers  Hepatic encephalopathy  Obesity  Fatty liver disease, nonalcoholic  Renal stones 25 years ago  Hypertension  Neuropathy  Hypercholesteremia  Diabetes    S/P cholecystectomy  Liver transplant 7/2020    FAMILY HISTORY:  Family history of type 2 diabetes mellitus  Family history of hypertension  Family history of stomach cancer      Social History: no alcohol, no smoking or drug use       Allergies  codeine (Anaphylaxis)      Standing Inpatient Medications  aspirin  chewable 81 milliGRAM(s) Oral daily  everolimus (ZORTRESS) 2 milliGRAM(s) Oral <User Schedule>  glucagon  Injectable 1 milliGRAM(s) IntraMuscular once  influenza   Vaccine 0.5 milliLiter(s) IntraMuscular once  insulin glargine Injectable (LANTUS) 10 Unit(s) SubCutaneous at bedtime  insulin lispro (ADMELOG) corrective regimen sliding scale   SubCutaneous three times a day before meals  insulin lispro (ADMELOG) corrective regimen sliding scale   SubCutaneous at bedtime  magnesium oxide 800 milliGRAM(s) Oral daily  meropenem  IVPB 500 milliGRAM(s) IV Intermittent every 12 hours  pantoprazole    Tablet 40 milliGRAM(s) Oral before breakfast  predniSONE   Tablet 5 milliGRAM(s) Oral daily  sodium chloride 0.9%. 1000 milliLiter(s) IV Continuous <Continuous>  tamsulosin 0.4 milliGRAM(s) Oral at bedtime  trimethoprim   80 mG/sulfamethoxazole 400 mG 1 Tablet(s) Oral daily  valGANciclovir 450 milliGRAM(s) Oral <User Schedule>        REVIEW OF SYSTEMS: per HPI, all other ROS negative         VITALS/PHYSICAL EXAM  --------------------------------------------------------------------------------  T(C): 36.8 (12-15-20 @ 09:00), Max: 38.4 (12-15-20 @ 03:15)  HR: 99 (12-15-20 @ 09:00) (82 - 111)  BP: 133/61 (12-15-20 @ 09:00) (116/62 - 154/71)  RR: 18 (12-15-20 @ 09:00) (16 - 18)  SpO2: 97% (12-15-20 @ 09:00) (93% - 100%)    Height (cm): 185.4 (12-14-20 @ 17:15)  Weight (kg): 113.4 (12-14-20 @ 17:15)  BMI (kg/m2): 33 (12-14-20 @ 17:15)  BSA (m2): 2.37 (12-14-20 @ 17:15)      12-14-20 @ 07:01  -  12-15-20 @ 07:00  --------------------------------------------------------  IN: 800 mL / OUT: 400 mL / NET: 400 mL    12-15-20 @ 07:01  -  12-15-20 @ 12:39  --------------------------------------------------------  IN: 1180 mL / OUT: 250 mL / NET: 930 mL      Physical Exam:  	Gen: Not in distress, well-appearing  	HEENT: pupils reactive to light, no scelral icterus, moist oral mucosa. No thrush. Supple neck, no JVD  	Pulm: normal respiratory effort, lungs clear to auscultation bilaterally   	CV: regular rate and rhythm, S1 and S2 normal, no murmur   	Abd: normoactive bowel sounds, soft and non distended abdomen. No tenderness, guarding or rigidity                    Transplant non tender, no bruit  	: No suprapubic tenderness          Back: No spinal or CVA tenderness; no pre sacral edema          Extremities: No edema. Distal pulses 2+ bilaterally           Skin: wram, no rash, no cyanosis   	Neuro: Alert and oriented to person, place and time. Normal speech. Normal affect.       LABS/STUDIES  --------------------------------------------------------------------------------              8.2    9.79  >-----------<  136      [12-15-20 @ 06:21]              24.6     134  |  103  |  54  ----------------------------<  172      [12-15-20 @ 06:21]  4.3   |  16  |  3.91        Ca     8.8     [12-15-20 @ 06:21]      Mg     1.9     [12-15-20 @ 06:21]      Phos  2.8     [12-15-20 @ 06:21]    TPro  6.1  /  Alb  3.0  /  TBili  0.3  /  DBili  x   /  AST  64  /  ALT  83  /  AlkPhos  177  [12-15-20 @ 06:21]    PT/INR: PT 15.5 , INR 1.31       [12-15-20 @ 06:18]  PTT: 27.6       [12-15-20 @ 06:18]      Creatinine Trend:  SCr 3.91 [12-15 @ 06:21]  SCr 3.68 [12-15 @ 03:55]  SCr 3.33 [12-14 @ 17:35]  SCr 1.57 [12-02 @ 06:29]  SCr 1.48 [12-01 @ 05:55]    Urinalysis - [12-15-20 @ 03:55]      Color Yellow / Appearance Slightly Turbid / SG 1.013 / pH 6.0      Gluc 200 mg/dL / Ketone Negative  / Bili Negative / Urobili Negative       Blood Moderate / Protein 300 mg/dL / Leuk Est Large / Nitrite Negative      RBC 7 /  / Hyaline 0 / Gran  / Sq Epi  / Non Sq Epi 0 / Bacteria Negative      Iron 160, TIBC Unable to calculate Test Repeated, %sat Unable to calculate Test Repeated      [06-12-20 @ 09:04]  Ferritin 493      [04-29-20 @ 08:20]  HbA1c 6.4      [02-12-20 @ 17:08]  TSH 3.26      [04-26-20 @ 09:47]  Lipid: chol 158, , HDL 44, LDL 93      [08-05-20 @ 08:57]    HBsAb <3.0      [12-04-19 @ 08:33]  HBsAb Nonreact      [06-13-20 @ 10:35]  HBsAg Nonreact      [06-13-20 @ 10:35]  HBcAb Nonreact      [06-13-20 @ 10:35]  HCV 0.14, Nonreact      [12-04-19 @ 08:33]  HIV Nonreact      [10-23-19 @ 05:08]    SRAVAN: titer Negative, pattern --      [12-03-19 @ 09:01]  dsDNA <12      [02-05-18 @ 18:48]  C3 Complement 36      [11-27-19 @ 00:11]  C4 Complement 7      [11-27-19 @ 00:11]  ANCA: cANCA Negative, pANCA Negative, atypical ANCA Negative      [02-05-18 @ 18:48]  Syphilis Screen (Treponema Pallidum Ab) Negative      [12-04-19 @ 08:33]  Free Light Chains: kappa 10.93, lambda 3.76, ratio = 2.91      [12-06 @ 09:43]  Immunofixation Serum:    One Weak IgA Lambda  Band and One Weak IgG Lambda Band Identified    Reference Range: None Detected      [04-29-20 @ 08:20]  SPEP Interpretation: Two Weak Gamma-Migrating Paraproteins Identified      [04-29-20 @ 08:20]  Immunofixation Urine: Reference Range: None Detected      [12-04-19 @ 14:03]  UPEP Interpretation: Weak Gamma-Migrating Paraprotein Identified      [12-04-19 @ 14:03]    Tacrolimus  Cyclosporine  Sirolimus  Mycophenolate  BK PCR  CMV PCR  Parvo PCR  EBV PCR [facility] DIVISION OF KIDNEY DISEASES AND HYPERTENSION -- INITIAL CONSULT NOTE  --------------------------------------------------------------------------------  Authored by: Adele Rowland   Cell # [phone]     HPI: 65 yr old man with history of JEAN/Cirrhosis 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 on immunosuppression with Everolimus and CellCept. He is sent in from clinic for lab work showing MISA and new transaminitis.     PMH: DM on insulin, hyperlipidemia, mild diastolic dysfunction, MGUS, chronic anemia, duodenal ulcer.   Post liver transplant he was unable to tolerate CNI due to neurotoxicity/delerium and was swtiched to Everolimus.   He has non healing left heal ulcer that was recently complicated by osteomyelitis s/p debridement by podiatry, he was on IV cefepime x 6 week course of which he completed 2 weeks.     Pt reports feeling tired/fatigued for the past few days. He had intermittent diarrhea over the past 3 days.   No Nausea or vomiting. No dysuria or hematuria. Did notice reduced UOP.   No new drainage from the foot. Has pain when foot is manipulated but no pain at rest.   Febrile to 101 overnight, -130 systolic. UOP 400ml overnight.           PAST HISTORY  --------------------------------------------------------------------------------  PAST MEDICAL & SURGICAL HISTORY:  GIB (gastrointestinal bleeding)  GERD with esophagitis  Gastritis &amp; Non Bleeding Ulcers  Hepatic encephalopathy  Obesity  Fatty liver disease, nonalcoholic  Renal stones 25 years ago  History of prostatitis   Hypertension  Neuropathy  Hypercholesteremia  Diabetes    S/P cholecystectomy  Liver transplant 7/2020    FAMILY HISTORY:  Family history of type 2 diabetes mellitus  Family history of hypertension  Family history of stomach cancer      Social History: no alcohol, no smoking or drug use       Allergies  codeine (Anaphylaxis)      Standing Inpatient Medications  aspirin  chewable 81 milliGRAM(s) Oral daily  everolimus (ZORTRESS) 2 milliGRAM(s) Oral <User Schedule>  glucagon  Injectable 1 milliGRAM(s) IntraMuscular once  influenza   Vaccine 0.5 milliLiter(s) IntraMuscular once  insulin glargine Injectable (LANTUS) 10 Unit(s) SubCutaneous at bedtime  insulin lispro (ADMELOG) corrective regimen sliding scale   SubCutaneous three times a day before meals  insulin lispro (ADMELOG) corrective regimen sliding scale   SubCutaneous at bedtime  magnesium oxide 800 milliGRAM(s) Oral daily  meropenem  IVPB 500 milliGRAM(s) IV Intermittent every 12 hours  pantoprazole    Tablet 40 milliGRAM(s) Oral before breakfast  predniSONE   Tablet 5 milliGRAM(s) Oral daily  sodium chloride 0.9%. 1000 milliLiter(s) IV Continuous <Continuous>  tamsulosin 0.4 milliGRAM(s) Oral at bedtime  trimethoprim   80 mG/sulfamethoxazole 400 mG 1 Tablet(s) Oral daily  valGANciclovir 450 milliGRAM(s) Oral <User Schedule>        REVIEW OF SYSTEMS: per HPI, all other ROS negative         VITALS/PHYSICAL EXAM  --------------------------------------------------------------------------------  T(C): 36.8 (12-15-20 @ 09:00), Max: 38.4 (12-15-20 @ 03:15)  HR: 99 (12-15-20 @ 09:00) (82 - 111)  BP: 133/61 (12-15-20 @ 09:00) (116/62 - 154/71)  RR: 18 (12-15-20 @ 09:00) (16 - 18)  SpO2: 97% (12-15-20 @ 09:00) (93% - 100%)    Height (cm): 185.4 (12-14-20 @ 17:15)  Weight (kg): 113.4 (12-14-20 @ 17:15)  BMI (kg/m2): 33 (12-14-20 @ 17:15)  BSA (m2): 2.37 (12-14-20 @ 17:15)      12-14-20 @ 07:01  -  12-15-20 @ 07:00  --------------------------------------------------------  IN: 800 mL / OUT: 400 mL / NET: 400 mL    12-15-20 @ 07:01  -  12-15-20 @ 12:39  --------------------------------------------------------  IN: 1180 mL / OUT: 250 mL / NET: 930 mL      Physical Exam:  Awake and alert, pale, not in respiratory distress  Dry oral mucosa, no JVP  Lungs clear b/l   RRR, + systolic murmur at the apex  Abdomen obese, soft, non tender  No extremity edema  RUE PICC line, clean dressing  Right foot plantar ulcer - deep, seen by podiatry, bone graft appears fine, no signs of infeciton   Alert and non focal       LABS/STUDIES  --------------------------------------------------------------------------------              8.2    9.79  >-----------<  136      [12-15-20 @ 06:21]              24.6     134  |  103  |  54  ----------------------------<  172      [12-15-20 @ 06:21]  4.3   |  16  |  3.91        Ca     8.8     [12-15-20 @ 06:21]      Mg     1.9     [12-15-20 @ 06:21]      Phos  2.8     [12-15-20 @ 06:21]    TPro  6.1  /  Alb  3.0  /  TBili  0.3  /  DBili  x   /  AST  64  /  ALT  83  /  AlkPhos  177  [12-15-20 @ 06:21]    PT/INR: PT 15.5 , INR 1.31       [12-15-20 @ 06:18]  PTT: 27.6       [12-15-20 @ 06:18]      Creatinine Trend:  SCr 3.91 [12-15 @ 06:21]  SCr 3.68 [12-15 @ 03:55]  SCr 3.33 [12-14 @ 17:35]  SCr 1.57 [12-02 @ 06:29]  SCr 1.48 [12-01 @ 05:55]    Urinalysis - [12-15-20 @ 03:55]      Color Yellow / Appearance Slightly Turbid / SG 1.013 / pH 6.0      Gluc 200 mg/dL / Ketone Negative  / Bili Negative / Urobili Negative       Blood Moderate / Protein 300 mg/dL / Leuk Est Large / Nitrite Negative      RBC 7 /  / Hyaline 0 / Gran  / Sq Epi  / Non Sq Epi 0 / Bacteria Negative      Iron 160, TIBC Unable to calculate Test Repeated, %sat Unable to calculate Test Repeated      [06-12-20 @ 09:04]  Ferritin 493      [04-29-20 @ 08:20]  HbA1c 6.4      [02-12-20 @ 17:08]  TSH 3.26      [04-26-20 @ 09:47]  Lipid: chol 158, , HDL 44, LDL 93      [08-05-20 @ 08:57]    HBsAb <3.0      [12-04-19 @ 08:33]  HBsAb Nonreact      [06-13-20 @ 10:35]  HBsAg Nonreact      [06-13-20 @ 10:35]  HBcAb Nonreact      [06-13-20 @ 10:35]  HCV 0.14, Nonreact      [12-04-19 @ 08:33]  HIV Nonreact      [10-23-19 @ 05:08]    SRAVAN: titer Negative, pattern --      [12-03-19 @ 09:01]  dsDNA <12      [02-05-18 @ 18:48]  C3 Complement 36      [11-27-19 @ 00:11]  C4 Complement 7      [11-27-19 @ 00:11]  ANCA: cANCA Negative, pANCA Negative, atypical ANCA Negative      [02-05-18 @ 18:48]  Syphilis Screen (Treponema Pallidum Ab) Negative      [12-04-19 @ 08:33]  Free Light Chains: kappa 10.93, lambda 3.76, ratio = 2.91      [12-06 @ 09:43]  Immunofixation Serum:    One Weak IgA Lambda  Band and One Weak IgG Lambda Band Identified    Reference Range: None Detected      [04-29-20 @ 08:20]  SPEP Interpretation: Two Weak Gamma-Migrating Paraproteins Identified      [04-29-20 @ 08:20]  Immunofixation Urine: Reference Range: None Detected      [12-04-19 @ 14:03]  UPEP Interpretation: Weak Gamma-Migrating Paraprotein Identified      [12-04-19 @ 14:03]

## 2020-12-15 NOTE — CHART NOTE - NSCHARTNOTEFT_GEN_A_CORE
Wound Care team Note:    Request for wound care consult received for foot wound and deferred to wound care team podiatrist, Dr. Wang. Will defer to Dr. Wang for management.    Tami Monroe NP-C, [facility] [id number]

## 2020-12-15 NOTE — DISCHARGE NOTE PROVIDER - CARE PROVIDERS DIRECT ADDRESSES
,[email or web address]

## 2020-12-15 NOTE — CONSULT NOTE ADULT - ASSESSMENT
64 yo male patient w/ stable right heel wound with incorporating stravix graft   - Pt seen and evaluated  - Afebrile at encounter, no leukocytosis  - Right heel fibrotic wound measuring 3.0x4.0x0.2cm with incorporating stravix graft with signs of wound contracture and healing; no drainage, no periwound erythema, no cellulitis, no purulence, no signs of acute infection  - During previous admission in November, [place] scan & surgical pathology of right calcaneal bone showing osteonecrosis/osteomyelitis. Patient was IV PICC'd for 6 weeks Cefepime 2g IV q8h per ID final recs  - No podiatric acute surgical intervention; right heel wound stable and with signs of healing  - Rec ID consult for other source of infection work up  - Stable from podiatric standpoint and follow up information in discharge paperwork  - Continue with local wound care with [facility]  - Evaluated at bedside with Dr. Chris, transplant attending surgeon   - Seen with attending Dr. Carroll Wang

## 2020-12-15 NOTE — PATIENT PROFILE ADULT - FALL HARM RISK CONCLUSION
SONU-Patient called and stated she was still unable to come and leave a urine, states she will come tomorrow morning 11/14/19 and leave sample. Patient just wanted you to be aware of this.   Jose Guerra on 11/13/2019 at 3:59 PM     Fall with Harm Risk

## 2020-12-15 NOTE — PROGRESS NOTE ADULT - SUBJECTIVE AND OBJECTIVE BOX
Transplant Surgery - Multidisciplinary Rounds  --------------------------------------------------------------  s/p OLT 7/2/2020        Present:   Patient seen and examined with multidisciplinary team including Transplant Surgeon: Dr. Chris, Dr. Lucio, Dr. Gray, Transplant Nephrologist: Dr. Rowland, Pharmacist: Jessica Nagel. ACPs Patrick/Inoyaissac, and unit RN during am rounds.  Disciplines not in attendance will be notified of the plan.     HPI: 65M with PMHx of IDDM, HLD, obesity, HFpEF with mild LV diastolic dysfunction, MGUS, chronic anemia with a history of duodenal ulcer 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  Underwent OLT on 7/2/2020, post op coure c/b VRE bacteremia tx with linezolid and delirium likely in setting of CNI toxicity (FK discontinued/Everolimus initiated 7/27).    Recent admission from 11/20-12/2 for OM of the R heel, s/p debridement with Podiatry, and was discharged with Cefepime x 6 weeks via PICC line (placed 12/1). Initial wound cx on admission grew Pseudomonas, OR cx grew staph epi. Was also found with neutropenia and Valcyte was reduced (was on 900mg daily CMV +/-).      Sent to ED from clinic with rising transaminitis and MISA (SCr 3.3 from 1.5)    Interval Events:  --->  Febrile overnight Tmax 38.4, Tachy 107-111  UA notable for large leuk est, blood, wbc 299, urine cx pending/bld cx pending  ID consulted; Cefepime broadened to meropenem, Vanco 1g x 1 dose given  Seen by podiatry for R heel ulcer, no concern for infection  PICC line removed this am, peripheral access obtained  Noted Heart murmur on exam (prior card's note reviewed, no documented murmur); TTE ordered  C diff pending     ----> Rising Creatinine 1.5 -> 3.6 (today)  Received IVF overnight; NS 500cc bolus this am  Urine electrolytes ordered    ----> Transaminitis  AST 18-> 77-> 61  ALT 26 -> 88 -> 81  Alk Phos 126 -> 169 -> 167  INR 0.97 -> 1.26 -> 1.31      Potential Discharge date: pending clinical improvement     Education:  Medications    Plan of care:  See Below    MEDICATIONS  (STANDING):  aspirin  chewable 81 milliGRAM(s) Oral daily  dextrose 40% Gel 15 Gram(s) Oral once  dextrose 5%. 1000 milliLiter(s) (50 mL/Hr) IV Continuous <Continuous>  dextrose 5%. 1000 milliLiter(s) (100 mL/Hr) IV Continuous <Continuous>  dextrose 50% Injectable 25 Gram(s) IV Push once  dextrose 50% Injectable 12.5 Gram(s) IV Push once  dextrose 50% Injectable 25 Gram(s) IV Push once  everolimus (ZORTRESS) 2 milliGRAM(s) Oral <User Schedule>  glucagon  Injectable 1 milliGRAM(s) IntraMuscular once  influenza   Vaccine 0.5 milliLiter(s) IntraMuscular once  insulin glargine Injectable (LANTUS) 10 Unit(s) SubCutaneous at bedtime  insulin lispro (ADMELOG) corrective regimen sliding scale   SubCutaneous three times a day before meals  insulin lispro (ADMELOG) corrective regimen sliding scale   SubCutaneous at bedtime  magnesium oxide 800 milliGRAM(s) Oral daily  meropenem  IVPB 500 milliGRAM(s) IV Intermittent every 12 hours  meropenem  IVPB      pantoprazole    Tablet 40 milliGRAM(s) Oral before breakfast  predniSONE   Tablet 5 milliGRAM(s) Oral daily  sodium chloride 0.9%. 1000 milliLiter(s) (100 mL/Hr) IV Continuous <Continuous>  tamsulosin 0.4 milliGRAM(s) Oral at bedtime  trimethoprim   80 mG/sulfamethoxazole 400 mG 1 Tablet(s) Oral daily  valGANciclovir 450 milliGRAM(s) Oral <User Schedule>    PAST MEDICAL & SURGICAL HISTORY:  GIB (gastrointestinal bleeding)  GERD with esophagitis  Gastritis &amp; Non Bleeding Ulcers  Hepatic encephalopathy  Obesity  Fatty liver disease, nonalcoholic  Renal stones 25 years ago  Hypertension  Neuropathy  Hypercholesteremia  Diabetes  S/P cholecystectomy    Vital Signs Last 24 Hrs  T(C): 36.8 (15 Dec 2020 09:00), Max: 38.4 (15 Dec 2020 03:15)  T(F): 98.3 (15 Dec 2020 09:00), Max: 101.1 (15 Dec 2020 03:15)  HR: 99 (15 Dec 2020 09:00) (82 - 111)  BP: 133/61 (15 Dec 2020 09:00) (116/62 - 154/71)  BP(mean): --  RR: 18 (15 Dec 2020 09:00) (16 - 18)  SpO2: 97% (15 Dec 2020 09:00) (93% - 100%)    I&O's Summary    14 Dec 2020 07:01  -  15 Dec 2020 07:00  --------------------------------------------------------  IN: 800 mL / OUT: 400 mL / NET: 400 mL    15 Dec 2020 07:01  -  15 Dec 2020 10:49  --------------------------------------------------------  IN: 1130 mL / OUT: 250 mL / NET: 880 mL                          8.2    9.79  )-----------( 136      ( 15 Dec 2020 06:21 )             24.6     12-15    134<L>  |  103  |  54<H>  ----------------------------<  172<H>  4.3   |  16<L>  |  3.91<H>    Ca    8.8      15 Dec 2020 06:21  Phos  2.8     12-15  Mg     1.9     12-15    TPro  6.1  /  Alb  3.0<L>  /  TBili  0.3  /  DBili  x   /  AST  64<H>  /  ALT  83<H>  /  AlkPhos  177<H>  12-15      Review of systems  Gen: + fever  Skin:  R heel wound   Head/Eyes/Ears/Mouth: No headache; Normal hearing; Normal vision w/o blurriness; No sinus pain/discomfort, sore throat  Respiratory: No dyspnea, cough, wheezing, hemoptysis  CV: No chest pain, PND, orthopnea  GI: Mild abdominal pain at surgical incision site; denies diarrhea, constipation, nausea, vomiting, melena, hematochezia  : No increased frequency, dysuria, hematuria, nocturia  MSK: No joint pain/swelling; no back pain; no edema  Neuro: No dizziness/lightheadedness, weakness, seizures, numbness, tingling  Heme: No easy bruising or bleeding  Endo: No heat/cold intolerance  Psych: No significant nervousness, anxiety, stress, depression  All other systems were reviewed and are negative, except as noted.      PHYSICAL EXAM:  Constitutional: Well developed / well nourished  Eyes: Anicteric, PERRLA  ENMT: nc/at  Respiratory: CTA B/L  Cardiovascular: RRR, + heart murmur  Gastrointestinal: Soft, non distended, non tender, incision well healed  Genitourinary: Voiding spontaneously  Extremities: SCD's in place and working bilaterally.   Vascular: Palpable dp pulses bilaterally  Neurological: A&O x3  Skin: R heel ulcer: no drainage, no cellulitis   Musculoskeletal: Moving all extremities  Psychiatric: Responsive     Transplant Surgery - Multidisciplinary Rounds  --------------------------------------------------------------  s/p OLT 7/2/2020        Present:   Patient seen and examined with multidisciplinary team including Transplant Surgeon: Dr. Chris, Dr. Lucio, Dr. Gray, Transplant Nephrologist: Dr. Rowland, Pharmacist: Jessica Nagel. ACPs Patrick/Otilia, and unit RN during am rounds.  Disciplines not in attendance will be notified of the plan.     HPI: 65M with PMHx of IDDM, HLD, obesity, HFpEF with mild LV diastolic dysfunction, MGUS, chronic anemia with a history of duodenal ulcer 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  Underwent OLT on 7/2/2020, post op coure c/b VRE bacteremia tx with linezolid and delirium likely in setting of CNI toxicity (FK discontinued/Everolimus initiated 7/27).    Recent admission from 11/20-12/2 for OM of the R heel, s/p debridement with Podiatry, and was discharged with Cefepime x 6 weeks via PICC line (placed 12/1). Initial wound cx on admission grew Pseudomonas, OR cx grew staph epi. Was also found with neutropenia and Valcyte was reduced (was on 900mg daily CMV +/-).      Sent to ED from clinic with rising transaminitis and MISA (SCr 3.3 from 1.5)    Interval Events:  --->  Febrile overnight Tmax 38.4, Tachy 107-111  UA notable for large leuk est, blood, wbc 299, urine cx pending/bld cx pending  ID consulted; Cefepime broadened to meropenem, Vanco 1g x 1 dose given  Seen by podiatry for R heel ulcer, no concern for infection  PICC line removed this am, peripheral access obtained  Noted Heart murmur on exam (prior card's note reviewed, no documented murmur); TTE ordered  Diarrhea: C diff pending, CMV PCR with am labs    ----> Rising Creatinine 1.5 -> 3.6 (today)  Received IVF overnight; NS 500cc bolus this am  Urine electrolytes ordered    ----> Transaminitis  AST 18-> 77-> 61  ALT 26 -> 88 -> 81  Alk Phos 126 -> 169 -> 167  INR 0.97 -> 1.26 -> 1.31      Potential Discharge date: pending clinical improvement     Education:  Medications    Plan of care:  See Below    MEDICATIONS  (STANDING):  aspirin  chewable 81 milliGRAM(s) Oral daily  dextrose 40% Gel 15 Gram(s) Oral once  dextrose 5%. 1000 milliLiter(s) (50 mL/Hr) IV Continuous <Continuous>  dextrose 5%. 1000 milliLiter(s) (100 mL/Hr) IV Continuous <Continuous>  dextrose 50% Injectable 25 Gram(s) IV Push once  dextrose 50% Injectable 12.5 Gram(s) IV Push once  dextrose 50% Injectable 25 Gram(s) IV Push once  everolimus (ZORTRESS) 2 milliGRAM(s) Oral <User Schedule>  glucagon  Injectable 1 milliGRAM(s) IntraMuscular once  influenza   Vaccine 0.5 milliLiter(s) IntraMuscular once  insulin glargine Injectable (LANTUS) 10 Unit(s) SubCutaneous at bedtime  insulin lispro (ADMELOG) corrective regimen sliding scale   SubCutaneous three times a day before meals  insulin lispro (ADMELOG) corrective regimen sliding scale   SubCutaneous at bedtime  magnesium oxide 800 milliGRAM(s) Oral daily  meropenem  IVPB 500 milliGRAM(s) IV Intermittent every 12 hours  meropenem  IVPB      pantoprazole    Tablet 40 milliGRAM(s) Oral before breakfast  predniSONE   Tablet 5 milliGRAM(s) Oral daily  sodium chloride 0.9%. 1000 milliLiter(s) (100 mL/Hr) IV Continuous <Continuous>  tamsulosin 0.4 milliGRAM(s) Oral at bedtime  trimethoprim   80 mG/sulfamethoxazole 400 mG 1 Tablet(s) Oral daily  valGANciclovir 450 milliGRAM(s) Oral <User Schedule>    PAST MEDICAL & SURGICAL HISTORY:  GIB (gastrointestinal bleeding)  GERD with esophagitis  Gastritis &amp; Non Bleeding Ulcers  Hepatic encephalopathy  Obesity  Fatty liver disease, nonalcoholic  Renal stones 25 years ago  Hypertension  Neuropathy  Hypercholesteremia  Diabetes  S/P cholecystectomy    Vital Signs Last 24 Hrs  T(C): 36.8 (15 Dec 2020 09:00), Max: 38.4 (15 Dec 2020 03:15)  T(F): 98.3 (15 Dec 2020 09:00), Max: 101.1 (15 Dec 2020 03:15)  HR: 99 (15 Dec 2020 09:00) (82 - 111)  BP: 133/61 (15 Dec 2020 09:00) (116/62 - 154/71)  BP(mean): --  RR: 18 (15 Dec 2020 09:00) (16 - 18)  SpO2: 97% (15 Dec 2020 09:00) (93% - 100%)    I&O's Summary    14 Dec 2020 07:01  -  15 Dec 2020 07:00  --------------------------------------------------------  IN: 800 mL / OUT: 400 mL / NET: 400 mL    15 Dec 2020 07:01  -  15 Dec 2020 10:49  --------------------------------------------------------  IN: 1130 mL / OUT: 250 mL / NET: 880 mL                          8.2    9.79  )-----------( 136      ( 15 Dec 2020 06:21 )             24.6     12-15    134<L>  |  103  |  54<H>  ----------------------------<  172<H>  4.3   |  16<L>  |  3.91<H>    Ca    8.8      15 Dec 2020 06:21  Phos  2.8     12-15  Mg     1.9     12-15    TPro  6.1  /  Alb  3.0<L>  /  TBili  0.3  /  DBili  x   /  AST  64<H>  /  ALT  83<H>  /  AlkPhos  177<H>  12-15      Review of systems  Gen: + fever  Skin:  R heel wound   Head/Eyes/Ears/Mouth: No headache; Normal hearing; Normal vision w/o blurriness; No sinus pain/discomfort, sore throat  Respiratory: No dyspnea, cough, wheezing, hemoptysis  CV: No chest pain, PND, orthopnea  GI: Mild abdominal pain at surgical incision site; denies diarrhea, constipation, nausea, vomiting, melena, hematochezia  : No increased frequency, dysuria, hematuria, nocturia  MSK: No joint pain/swelling; no back pain; no edema  Neuro: No dizziness/lightheadedness, weakness, seizures, numbness, tingling  Heme: No easy bruising or bleeding  Endo: No heat/cold intolerance  Psych: No significant nervousness, anxiety, stress, depression  All other systems were reviewed and are negative, except as noted.      PHYSICAL EXAM:  Constitutional: Well developed / well nourished  Eyes: Anicteric, PERRLA  ENMT: nc/at  Respiratory: CTA B/L  Cardiovascular: RRR, + heart murmur  Gastrointestinal: Soft, non distended, non tender, incision well healed  Genitourinary: Voiding spontaneously  Extremities: SCD's in place and working bilaterally.   Vascular: Palpable dp pulses bilaterally  Neurological: A&O x3  Skin: R heel ulcer: no drainage, no cellulitis   Musculoskeletal: Moving all extremities  Psychiatric: Responsive

## 2020-12-15 NOTE — DISCHARGE NOTE PROVIDER - NSDCFUSCHEDAPPT_GEN_ALL_CORE_FT
JESSICA MARTIN ; 12/17/2020 ; NPP Surg TrPl [address] [facility] JESSICA García ; 12/22/2020 ; NPP Surg Wound [address] JESSICA Blackburn ; 12/22/2020 ; NPP Surg Wound [address] JESSICA Blackburn ; 12/23/2020 ; NPP Med  Comm

## 2020-12-15 NOTE — CONSULT NOTE ADULT - ASSESSMENT
65 yr old man with liver transplant in July 2020, right plantar osteo s/p debridement on IV cefepime for 2-3 weeks.   He is admitted with MISA. Serum creatinine was 1.3-1.5mg/dL on previous admission few weeks ago, now up to 3.9mg/dL.   He also has fever and pyuria - sepsis due to likely urinary source. Also has diarrhea for the past few days r/o c.diff. Foot unlikely to be source of infection per podiatry.     MISA likely due sepsis vs possible AIN from antibiotics   R/o obstruction in view of h/o prostatitis and kidney stones     - Change cefepime to meropenem, f/u urine and blood cultures   - Change PICC line, f/u blood cultures, obtain echocardiogram to look for vegetations   - Obtain CT scan abdomen and pelvis - non contrast   - IV fluids NS 1 liter bolus then RL 100cc/hour   - Strict I/O  - Urine lytes and urine creatinine   - Send stool for c.diff

## 2020-12-15 NOTE — CONSULT NOTE ADULT - ATTENDING COMMENTS
[age] M PMH JEAN Cirrhosis s/p OLT (CMV +/-) on 7/2/2020 (with post-op course complicated by VRE bacteremia, CNI Neurotoxicity), IDDM, hyperlipidemia, obesity, HFpEF with mild LV diastolic dysfunction, MGUS who presented with fevers, diarrhea and abnormal labwork    Of note, recent admission for R Heel Osteomyelitis   s/p right foot heel incision and drainage w/ application of stravix and bone biopsy on 11/24  Superficial cultures with Pseudomonas and Operative Cultures with Coag Neg Staph in fluid media  Discharged on IV Cefepime with plan for 6 week course    On 12/14 labwork was noted to have MISA and Transaminitis  Of note [facility] outpatient labs from 12/9 with Cr of 1.41 and LFT's WNL  Patient also notes developing fever and diarrhea over last 24 hours    U/A here with pyuria.   COVID19 PCR Negative  C diff toxin assay indeterminate with PCR pending  CXR Clear   CT A/P with distal left ureteral calculus without hydronephrosis and Diffuse wall thickening, slightly asymmetric on the left but without prostate abscess.     At this time would favor coverage with Daptomycin and Meropenem (as patient previously with ESBL E coli Prostate abscess and VRE bacteremia post-transplant) pending blood and urine cultures    Would followup the C diff PCR given patients diarrhea.     The patient's heel appears without cellulitis or drainage so I doubt that this is the source of his fever. Suspect that UTI/Pyelonephritis is most likely    Overall, Fever, Transaminitis, MISA, Abnormal Urinalysis, Nephrolithiasis, Heel Osteomyelitis, Liver Transplant Recipient    I will continue to follow. Please feel free to contact me with any further questions.    Eusebio Pérez M.D.  [facility] Division of Infectious Disease  8AM-5PM: Pager Number [phone]  After Hours (or if no response): Please contact the Infectious Diseases Office at [phone]     The above assessment and plan were discussed with Dr Chris and Transplant Surgery Team

## 2020-12-15 NOTE — PROGRESS NOTE ADULT - ATTENDING COMMENTS
admitted with low grade fever, diarrhea, rise in creatinine and lfts  antibiotics started  workup in progress; stools for c diff, abd CT, us liver  nephrology, ID, and podiatry consult

## 2020-12-15 NOTE — DISCHARGE NOTE PROVIDER - CARE PROVIDER_API CALL
Zack Gray I  SURGERY  [address] [place], [place] [address]  Phone: [phone]  Fax: [phone]  Follow Up Time:    Zack Gray  SURGERY  [address] [place] [address]  Phone: [phone]  Fax: [phone]  Follow Up Time:     Sergio Chris  SURGERY  [address] [place] [id number]  Phone: [phone]  Fax: [phone]  Follow Up Time:

## 2020-12-15 NOTE — CONSULT NOTE ADULT - ASSESSMENT
Assessment:  The patient is a 65 year old male with past medical history of OLT (CMV +/-), IDDM, hyperlipidemia, obesity, HFpEF with mild LV diastolic dysfunction, MGUS, chronic anemia with a history of duodenal ulcer as well as GAVE and duodenal AVM s/p APC (last on 10/11/19), decompensated JEAN/cirrhosis who underwent OLT on 7/2/2020, with post-op course complicated by VRE bacteremia that was treated with linezolid and delirium likely in setting of CNI toxicity (FK discontinued/Everolimus initiated 7/27). He was sent in from the clinic after lab work revealed new transaminitis and acute kidney injury. He was also complaining of 24 hours of diarrhea and associated fatigue. Infectious disease was consulted for evaluation of right foot osteomyelitis.    Plan:  # Right foot osteomyelitis  - Continue intravenous meropenem  - Await blood and urine cultures  - CMV PCR ordered  - ID will continue to follow    hCarlie Lafleur MD PGY-4   Fellow, Infectious Diseases   Pager: [phone]  If no response, after 5pm and on weekends: Call [phone]   Assessment:  The patient is a 65 year old male with past medical history of OLT (CMV +/-), IDDM, hyperlipidemia, obesity, HFpEF with mild LV diastolic dysfunction, MGUS, chronic anemia with a history of duodenal ulcer as well as GAVE and duodenal AVM s/p APC (last on 10/11/19), decompensated JEAN/cirrhosis who underwent OLT on 7/2/2020, with post-op course complicated by VRE bacteremia that was treated with linezolid and delirium likely in setting of CNI toxicity (FK discontinued/Everolimus initiated 7/27). He was sent in from the clinic after lab work revealed new transaminitis and acute kidney injury. He was also complaining of 24 hours of diarrhea and associated fatigue. Infectious disease was consulted for evaluation of right foot osteomyelitis.    Plan:  # Abdominal pain, diarrhea, fever with transaminitis - rule out c. diff infection vs. possible transplant rejection   - Continue intravenous meropenem  - Await blood and urine cultures  - C. diff panel indeterminate, send GI PCR  - Maintain contact precautions   - CMV PCR ordered  - Monitor fever curve   - Case discussed with primary team   - ID will continue to follow    Charlie Lafleur MD PGY-4   Fellow, Infectious Diseases   Pager: [phone]  If no response, after 5pm and on weekends: Call [phone]   Assessment:  65 year old male with past medical history of OLT (CMV +/-), IDDM, hyperlipidemia, obesity, HFpEF with mild LV diastolic dysfunction, MGUS, chronic anemia with a history of duodenal ulcer as well as GAVE and duodenal AVM s/p APC (last on 10/11/19), decompensated JEAN/cirrhosis who underwent OLT on 7/2/2020, with post-op course complicated by VRE bacteremia that was treated with linezolid and delirium likely in setting of CNI toxicity (FK discontinued/Everolimus initiated 7/27). He was sent in from the clinic after lab work revealed new transaminitis and acute kidney injury. He was also complaining of 24 hours of diarrhea and associated fatigue. Infectious disease was consulted for evaluation of right foot osteomyelitis.    Plan:  # Abdominal pain, diarrhea, fever with transaminitis - rule out c. diff infection vs. possible transplant rejection   - Continue intravenous meropenem (prior ESBL E coli)  - Would add Daptomycin 8 mg/kg IV Q48H pending UCx and BCx (prior VRE)  - Await blood and urine cultures  - C. diff panel indeterminate, GI PCR pending  - Maintain contact precautions   - CMV PCR ordered  - Monitor fever curve   - Case discussed with primary team   - ID will continue to follow    Charlie Lafleur MD PGY-4   Fellow, Infectious Diseases   Pager: [phone]  If no response, after 5pm and on weekends: Call [phone]

## 2020-12-16 LAB
ALBUMIN SERPL ELPH-MCNC: 2.7 G/DL — LOW (ref 3.3–5)
ALBUMIN SERPL ELPH-MCNC: 2.8 G/DL — LOW (ref 3.3–5)
ALP SERPL-CCNC: 196 U/L — HIGH (ref 40–120)
ALP SERPL-CCNC: 210 U/L — HIGH (ref 40–120)
ALT FLD-CCNC: 72 U/L — HIGH (ref 10–45)
ALT FLD-CCNC: 80 U/L — HIGH (ref 10–45)
ANION GAP SERPL CALC-SCNC: 14 MMOL/L — SIGNIFICANT CHANGE UP (ref 5–17)
ANION GAP SERPL CALC-SCNC: 15 MMOL/L — SIGNIFICANT CHANGE UP (ref 5–17)
AST SERPL-CCNC: 54 U/L — HIGH (ref 10–40)
AST SERPL-CCNC: 60 U/L — HIGH (ref 10–40)
BASOPHILS # BLD AUTO: 0.04 K/UL — SIGNIFICANT CHANGE UP (ref 0–0.2)
BASOPHILS NFR BLD AUTO: 0.4 % — SIGNIFICANT CHANGE UP (ref 0–2)
BILIRUB DIRECT SERPL-MCNC: 0.1 MG/DL — SIGNIFICANT CHANGE UP (ref 0–0.2)
BILIRUB INDIRECT FLD-MCNC: 0.1 MG/DL — LOW (ref 0.2–1)
BILIRUB SERPL-MCNC: 0.2 MG/DL — SIGNIFICANT CHANGE UP (ref 0.2–1.2)
BILIRUB SERPL-MCNC: 0.2 MG/DL — SIGNIFICANT CHANGE UP (ref 0.2–1.2)
BUN SERPL-MCNC: 63 MG/DL — HIGH (ref 7–23)
BUN SERPL-MCNC: 66 MG/DL — HIGH (ref 7–23)
C DIFF BY PCR RESULT: DETECTED
C DIFF TOX GENS STL QL NAA+PROBE: SIGNIFICANT CHANGE UP
CALCIUM SERPL-MCNC: 8.2 MG/DL — LOW (ref 8.4–10.5)
CALCIUM SERPL-MCNC: 8.7 MG/DL — SIGNIFICANT CHANGE UP (ref 8.4–10.5)
CHLORIDE SERPL-SCNC: 102 MMOL/L — SIGNIFICANT CHANGE UP (ref 96–108)
CHLORIDE SERPL-SCNC: 103 MMOL/L — SIGNIFICANT CHANGE UP (ref 96–108)
CO2 SERPL-SCNC: 15 MMOL/L — LOW (ref 22–31)
CO2 SERPL-SCNC: 16 MMOL/L — LOW (ref 22–31)
CREAT SERPL-MCNC: 4.93 MG/DL — HIGH (ref 0.5–1.3)
CREAT SERPL-MCNC: 5.37 MG/DL — HIGH (ref 0.5–1.3)
CULTURE RESULTS: SIGNIFICANT CHANGE UP
EOSINOPHIL # BLD AUTO: 0.04 K/UL — SIGNIFICANT CHANGE UP (ref 0–0.5)
EOSINOPHIL NFR BLD AUTO: 0.4 % — SIGNIFICANT CHANGE UP (ref 0–6)
GLUCOSE BLDC GLUCOMTR-MCNC: 139 MG/DL — HIGH (ref 70–99)
GLUCOSE BLDC GLUCOMTR-MCNC: 146 MG/DL — HIGH (ref 70–99)
GLUCOSE BLDC GLUCOMTR-MCNC: 150 MG/DL — HIGH (ref 70–99)
GLUCOSE BLDC GLUCOMTR-MCNC: 201 MG/DL — HIGH (ref 70–99)
GLUCOSE BLDC GLUCOMTR-MCNC: 217 MG/DL — HIGH (ref 70–99)
GLUCOSE SERPL-MCNC: 132 MG/DL — HIGH (ref 70–99)
GLUCOSE SERPL-MCNC: 173 MG/DL — HIGH (ref 70–99)
HCT VFR BLD CALC: 22.9 % — LOW (ref 39–50)
HCT VFR BLD CALC: 24.6 % — LOW (ref 39–50)
HGB BLD-MCNC: 7.6 G/DL — LOW (ref 13–17)
HGB BLD-MCNC: 8 G/DL — LOW (ref 13–17)
IMM GRANULOCYTES NFR BLD AUTO: 1 % — SIGNIFICANT CHANGE UP (ref 0–1.5)
INR BLD: 1.33 RATIO — HIGH (ref 0.88–1.16)
LYMPHOCYTES # BLD AUTO: 0.62 K/UL — LOW (ref 1–3.3)
LYMPHOCYTES # BLD AUTO: 6.3 % — LOW (ref 13–44)
MAGNESIUM SERPL-MCNC: 1.9 MG/DL — SIGNIFICANT CHANGE UP (ref 1.6–2.6)
MCHC RBC-ENTMCNC: 28.8 PG — SIGNIFICANT CHANGE UP (ref 27–34)
MCHC RBC-ENTMCNC: 29.2 PG — SIGNIFICANT CHANGE UP (ref 27–34)
MCHC RBC-ENTMCNC: 32.5 GM/DL — SIGNIFICANT CHANGE UP (ref 32–36)
MCHC RBC-ENTMCNC: 33.2 GM/DL — SIGNIFICANT CHANGE UP (ref 32–36)
MCV RBC AUTO: 88.1 FL — SIGNIFICANT CHANGE UP (ref 80–100)
MCV RBC AUTO: 88.5 FL — SIGNIFICANT CHANGE UP (ref 80–100)
MONOCYTES # BLD AUTO: 0.9 K/UL — SIGNIFICANT CHANGE UP (ref 0–0.9)
MONOCYTES NFR BLD AUTO: 9.2 % — SIGNIFICANT CHANGE UP (ref 2–14)
NEUTROPHILS # BLD AUTO: 8.08 K/UL — HIGH (ref 1.8–7.4)
NEUTROPHILS NFR BLD AUTO: 82.7 % — HIGH (ref 43–77)
NRBC # BLD: 0 /100 WBCS — SIGNIFICANT CHANGE UP (ref 0–0)
NRBC # BLD: 0 /100 WBCS — SIGNIFICANT CHANGE UP (ref 0–0)
PHOSPHATE SERPL-MCNC: 2.9 MG/DL — SIGNIFICANT CHANGE UP (ref 2.5–4.5)
PLATELET # BLD AUTO: 136 K/UL — LOW (ref 150–400)
PLATELET # BLD AUTO: 153 K/UL — SIGNIFICANT CHANGE UP (ref 150–400)
POTASSIUM SERPL-MCNC: 4.1 MMOL/L — SIGNIFICANT CHANGE UP (ref 3.5–5.3)
POTASSIUM SERPL-MCNC: 4.2 MMOL/L — SIGNIFICANT CHANGE UP (ref 3.5–5.3)
POTASSIUM SERPL-SCNC: 4.1 MMOL/L — SIGNIFICANT CHANGE UP (ref 3.5–5.3)
POTASSIUM SERPL-SCNC: 4.2 MMOL/L — SIGNIFICANT CHANGE UP (ref 3.5–5.3)
PROT SERPL-MCNC: 5.7 G/DL — LOW (ref 6–8.3)
PROT SERPL-MCNC: 6 G/DL — SIGNIFICANT CHANGE UP (ref 6–8.3)
PROTHROM AB SERPL-ACNC: 15.8 SEC — HIGH (ref 10.6–13.6)
RBC # BLD: 2.6 M/UL — LOW (ref 4.2–5.8)
RBC # BLD: 2.78 M/UL — LOW (ref 4.2–5.8)
RBC # FLD: 13.7 % — SIGNIFICANT CHANGE UP (ref 10.3–14.5)
RBC # FLD: 14 % — SIGNIFICANT CHANGE UP (ref 10.3–14.5)
SODIUM SERPL-SCNC: 132 MMOL/L — LOW (ref 135–145)
SODIUM SERPL-SCNC: 133 MMOL/L — LOW (ref 135–145)
SPECIMEN SOURCE: SIGNIFICANT CHANGE UP
VANCOMYCIN TROUGH SERPL-MCNC: 6.5 UG/ML — LOW (ref 10–20)
WBC # BLD: 8.9 K/UL — SIGNIFICANT CHANGE UP (ref 3.8–10.5)
WBC # BLD: 9.78 K/UL — SIGNIFICANT CHANGE UP (ref 3.8–10.5)
WBC # FLD AUTO: 8.9 K/UL — SIGNIFICANT CHANGE UP (ref 3.8–10.5)
WBC # FLD AUTO: 9.78 K/UL — SIGNIFICANT CHANGE UP (ref 3.8–10.5)

## 2020-12-16 PROCEDURE — 99232 SBSQ HOSP IP/OBS MODERATE 35: CPT | Mod: GC,24

## 2020-12-16 PROCEDURE — 99233 SBSQ HOSP IP/OBS HIGH 50: CPT

## 2020-12-16 PROCEDURE — 76770 US EXAM ABDO BACK WALL COMP: CPT | Mod: 26

## 2020-12-16 PROCEDURE — 93970 EXTREMITY STUDY: CPT | Mod: 26

## 2020-12-16 PROCEDURE — 99233 SBSQ HOSP IP/OBS HIGH 50: CPT | Mod: GC

## 2020-12-16 RX ORDER — MAGNESIUM SULFATE 500 MG/ML
1 VIAL (ML) INJECTION ONCE
Refills: 0 | Status: COMPLETED | OUTPATIENT
Start: 2020-12-16 | End: 2020-12-16

## 2020-12-16 RX ORDER — FUROSEMIDE 40 MG
40 TABLET ORAL DAILY
Refills: 0 | Status: DISCONTINUED | OUTPATIENT
Start: 2020-12-16 | End: 2020-12-16

## 2020-12-16 RX ORDER — SODIUM BICARBONATE 1 MEQ/ML
650 SYRINGE (ML) INTRAVENOUS THREE TIMES A DAY
Refills: 0 | Status: DISCONTINUED | OUTPATIENT
Start: 2020-12-16 | End: 2020-12-17

## 2020-12-16 RX ADMIN — Medication 125 MILLIGRAM(S): at 21:39

## 2020-12-16 RX ADMIN — EVEROLIMUS 2 MILLIGRAM(S): 10 TABLET ORAL at 19:57

## 2020-12-16 RX ADMIN — VALGANCICLOVIR 450 MILLIGRAM(S): 450 TABLET, FILM COATED ORAL at 10:30

## 2020-12-16 RX ADMIN — Medication 2: at 17:11

## 2020-12-16 RX ADMIN — MAGNESIUM OXIDE 400 MG ORAL TABLET 800 MILLIGRAM(S): 241.3 TABLET ORAL at 13:02

## 2020-12-16 RX ADMIN — Medication 125 MILLIGRAM(S): at 13:02

## 2020-12-16 RX ADMIN — Medication 5 MILLIGRAM(S): at 05:33

## 2020-12-16 RX ADMIN — PANTOPRAZOLE SODIUM 40 MILLIGRAM(S): 20 TABLET, DELAYED RELEASE ORAL at 05:33

## 2020-12-16 RX ADMIN — Medication 125 MILLIGRAM(S): at 17:06

## 2020-12-16 RX ADMIN — Medication 40 MILLIGRAM(S): at 06:54

## 2020-12-16 RX ADMIN — EVEROLIMUS 2 MILLIGRAM(S): 10 TABLET ORAL at 08:47

## 2020-12-16 RX ADMIN — Medication 500000 UNIT(S): at 21:39

## 2020-12-16 RX ADMIN — Medication 125 MILLIGRAM(S): at 00:29

## 2020-12-16 RX ADMIN — Medication 500000 UNIT(S): at 13:01

## 2020-12-16 RX ADMIN — MEROPENEM 100 MILLIGRAM(S): 1 INJECTION INTRAVENOUS at 17:04

## 2020-12-16 RX ADMIN — HEPARIN SODIUM 5000 UNIT(S): 5000 INJECTION INTRAVENOUS; SUBCUTANEOUS at 17:07

## 2020-12-16 RX ADMIN — Medication 100 GRAM(S): at 13:01

## 2020-12-16 RX ADMIN — INSULIN GLARGINE 10 UNIT(S): 100 INJECTION, SOLUTION SUBCUTANEOUS at 21:39

## 2020-12-16 RX ADMIN — MEROPENEM 100 MILLIGRAM(S): 1 INJECTION INTRAVENOUS at 05:33

## 2020-12-16 RX ADMIN — Medication 500000 UNIT(S): at 00:29

## 2020-12-16 RX ADMIN — Medication 650 MILLIGRAM(S): at 21:38

## 2020-12-16 RX ADMIN — Medication 500000 UNIT(S): at 17:07

## 2020-12-16 RX ADMIN — Medication 650 MILLIGRAM(S): at 13:07

## 2020-12-16 RX ADMIN — TAMSULOSIN HYDROCHLORIDE 0.4 MILLIGRAM(S): 0.4 CAPSULE ORAL at 21:39

## 2020-12-16 RX ADMIN — Medication 81 MILLIGRAM(S): at 13:01

## 2020-12-16 RX ADMIN — Medication 500000 UNIT(S): at 05:33

## 2020-12-16 RX ADMIN — HEPARIN SODIUM 5000 UNIT(S): 5000 INJECTION INTRAVENOUS; SUBCUTANEOUS at 05:35

## 2020-12-16 RX ADMIN — Medication 125 MILLIGRAM(S): at 05:33

## 2020-12-16 NOTE — PROGRESS NOTE ADULT - NUTRITIONAL ASSESSMENT
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, CNI Neurotoxicity), IDDM, hyperlipidemia, obesity, HFpEF with mild LV diastolic dysfunction, MGUS who presented with fevers, diarrhea and abnormal labwork    Of note, recent admission for R Heel Osteomyelitis   s/p right foot heel incision and drainage w/ application of stravix and bone biopsy on 11/24  Superficial cultures with Pseudomonas and Operative Cultures with Coag Neg Staph in fluid media  Discharged on IV Cefepime with plan for 6 week course    On 12/14 labwork was noted to have MISA and Transaminitis  Of note [facility] outpatient labs from 12/9 with Cr of 1.41 and LFT's WNL    U/A here with pyuria, UCx with NGTD  COVID19 PCR Negative  C diff toxin assay indeterminate with Positive C diff PCR  CXR Clear   CT A/P with distal left ureteral calculus without hydronephrosis and Diffuse wall thickening, slightly asymmetric on the left but without prostate abscess.     The patient's heel appears without cellulitis or drainage so I doubt that this is the source of his fever. Suspect that C diff +/- UTI/Pyelonephritis is most likely    Given the negative UCx believe we can discontinue Daptomycin (No VRE growth)  Would continue Meropenem at this point (given prior ESBL E coli recurrent UTI)  Would dose decrease Meropenem as per fellow note  Would continue PO Vancomycin  If continued rise in Cr would obtain Renal US to exclude developing hydronephrosis at his left ureteral calculus    Overall, C diff infection, Fever, Transaminitis, MISA, Abnormal Urinalysis, Nephrolithiasis, Heel Osteomyelitis, Liver Transplant Recipient    I will continue to follow. Please feel free to contact me with any further questions.    Eusebio Pérez M.D.  [facility] Division of Infectious Disease  8AM-5PM: Pager Number [phone]  After Hours (or if no response): Please contact the Infectious Diseases Office at [phone]     The above assessment and plan were discussed with transplant surgery team

## 2020-12-16 NOTE — PHYSICAL THERAPY INITIAL EVALUATION ADULT - ADDITIONAL COMMENTS
Home situation/prior level of function-No indoor stairs to negotiate. Was independent ambulator with no device indoors, rolling walker in the community. Has wheelchair and shower chair at home.

## 2020-12-16 NOTE — PROGRESS NOTE ADULT - SUBJECTIVE AND OBJECTIVE BOX
Transplant Surgery - Multidisciplinary Rounds  --------------------------------------------------------------  s/p OLT 7/2/2020      Admitted 12/14/2020 with elevated LFTs, MISA, diarrhea    Present:   Patient seen and examined with multidisciplinary team including Transplant Surgeon: Dr. Lucio, Dr. Gray, Transplant Nephrologist: Dr. Rowland, Pharmacist: Jessica Nagel. ACPs Xavi/Abilio and unit RN during am rounds.  Disciplines not in attendance will be notified of the plan.     HPI: 65M with PMHx of IDDM, HLD, obesity, HFpEF with mild LV diastolic dysfunction, MGUS, chronic anemia with a history of duodenal ulcer 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  Underwent OLT on 7/2/2020, post op course c/b VRE bacteremia tx with linezolid and delirium likely in setting of CNI toxicity (FK discontinued/Everolimus initiated 7/27).    Recent admission from 11/20-12/2 for OM of the R heel, s/p debridement with Podiatry, and was discharged with Cefepime x 6 weeks via PICC line (placed 12/1). Initial wound cx on admission grew Pseudomonas, OR cx grew staph epi. Was also found with neutropenia and Valcyte was reduced (was on 900mg daily CMV +/-).      Sent to ED from clinic with rising transaminitis and MISA (SCr 3.3 from 1.5)    Interval Events:  - Tmax 38.1  WBC 9.78   - Vancomycin 1gm x 1.  Cefepime changed to Meropenem.  Oral Vanco started  - CDiff PCR positive   - 12/14 BCx  ngtd   UCX pending      - PICC remove and tip sent for Cx pending  - CT scan completed showing 5mm distal ureteral calculus, no hydro, no prostate abscess, diffuse bladder wall thickening  - TTE: EF 61%.  No obvious endocarditis  - Liver US: patent portal veins, no tardus parvus, elevated velocities in main hepatic artery    ----> Rising Creatinine  - SCr rising 1.5 -> 3.9 -> 4.93     NS 500ml bolus given    UOP 1.3L    ----> Transaminitis  AST 18-> 77-> 61 -> 60  ALT 26 -> 88 -> 81-> 80    Alk Phos 126 -> 169 -> 167 -> 196  INR 0.97 -> 1.26 -> 1.31 -> 1.33      Potential Discharge date: pending clinical improvement     Education:  Medications    Plan of care:  See Below      MEDICATIONS  (STANDING):  aspirin  chewable 81 milliGRAM(s) Oral daily  DAPTOmycin IVPB      dextrose 40% Gel 15 Gram(s) Oral once  dextrose 5%. 1000 milliLiter(s) (50 mL/Hr) IV Continuous <Continuous>  dextrose 5%. 1000 milliLiter(s) (100 mL/Hr) IV Continuous <Continuous>  dextrose 50% Injectable 25 Gram(s) IV Push once  dextrose 50% Injectable 12.5 Gram(s) IV Push once  dextrose 50% Injectable 25 Gram(s) IV Push once  everolimus (ZORTRESS) 2 milliGRAM(s) Oral <User Schedule>  glucagon  Injectable 1 milliGRAM(s) IntraMuscular once  heparin   Injectable 5000 Unit(s) SubCutaneous every 12 hours  influenza   Vaccine 0.5 milliLiter(s) IntraMuscular once  insulin glargine Injectable (LANTUS) 10 Unit(s) SubCutaneous at bedtime  insulin lispro (ADMELOG) corrective regimen sliding scale   SubCutaneous three times a day before meals  insulin lispro (ADMELOG) corrective regimen sliding scale   SubCutaneous at bedtime  magnesium oxide 800 milliGRAM(s) Oral daily  meropenem  IVPB 500 milliGRAM(s) IV Intermittent every 12 hours  meropenem  IVPB      nystatin    Suspension 473499 Unit(s) Oral four times a day  pantoprazole    Tablet 40 milliGRAM(s) Oral before breakfast  predniSONE   Tablet 5 milliGRAM(s) Oral daily  sodium bicarbonate 650 milliGRAM(s) Oral three times a day  tamsulosin 0.4 milliGRAM(s) Oral at bedtime  valGANciclovir 450 milliGRAM(s) Oral <User Schedule>  vancomycin    Solution 125 milliGRAM(s) Oral every 6 hours    MEDICATIONS  (PRN):      PAST MEDICAL & SURGICAL HISTORY:  GIB (gastrointestinal bleeding)    GERD with esophagitis  Gastritis &amp; Non Bleeding Ulcers    Hepatic encephalopathy    Obesity    Fatty liver disease, nonalcoholic    Renal stones  25 years ago    Hypertension    Neuropathy    Hypercholesteremia    Diabetes    S/P cholecystectomy        Vital Signs Last 24 Hrs  T(C): 37.3 (16 Dec 2020 09:00), Max: 38.1 (16 Dec 2020 01:00)  T(F): 99.2 (16 Dec 2020 09:00), Max: 100.5 (16 Dec 2020 01:00)  HR: 91 (16 Dec 2020 09:00) (91 - 98)  BP: 141/67 (16 Dec 2020 09:00) (118/66 - 141/67)  BP(mean): --  RR: 18 (16 Dec 2020 09:00) (16 - 18)  SpO2: 97% (16 Dec 2020 09:00) (95% - 98%)    I&O's Summary    15 Dec 2020 07:01  -  16 Dec 2020 07:00  --------------------------------------------------------  IN: 4310 mL / OUT: 1375 mL / NET: 2935 mL    16 Dec 2020 07:01  -  16 Dec 2020 10:37  --------------------------------------------------------  IN: 200 mL / OUT: 250 mL / NET: -50 mL                              8.0    9.78  )-----------( 153      ( 16 Dec 2020 05:39 )             24.6     12-16    133<L>  |  103  |  63<H>  ----------------------------<  132<H>  4.1   |  15<L>  |  4.93<H>    Ca    8.7      16 Dec 2020 05:39  Phos  2.9     12-16  Mg     1.9     12-16    TPro  6.0  /  Alb  2.8<L>  /  TBili  0.2  /  DBili  x   /  AST  60<H>  /  ALT  80<H>  /  AlkPhos  196<H>  12-16      Culture - Blood (collected 12-14-20 @ 23:13)  Source: .Blood Blood-Peripheral  Preliminary Report (12-16-20 @ 01:02):    No growth to date.    Culture - Blood (collected 12-14-20 @ 23:13)  Source: .Blood Blood-Peripheral  Preliminary Report (12-16-20 @ 01:02):    No growth to date.    Culture - Blood (collected 12-14-20 @ 23:13)  Source: .Blood PICC/PERC Single Lumen  Preliminary Report (12-16-20 @ 01:02):    No growth to date.      EXAM:  CT ABDOMEN AND PELVIS    PROCEDURE DATE:  12/15/2020    INTERPRETATION:  CLINICAL INFORMATION: Post liver transplant with rising LFTs, febrile and positive urinalysis. Evaluate for prostatic abscess.  COMPARISON: Chest abdomen pelvis 7/11/2020  PROCEDURE:  CT of the Abdomen and Pelvis was performed without intravenous contrast.  Intravenous contrast: None.  Oral contrast: None.  Sagittal and coronal reformats were performed.    FINDINGS:  LOWER CHEST: Coronary calcification.    LIVER: Hepatic transplant.  BILE DUCTS: Normal caliber.  GALLBLADDER: Cholecystectomy.  SPLEEN: Within normal limits.  PANCREAS: Within normal limits.  ADRENALS: Within normal limits.  KIDNEYS/URETERS: A 5 mm distal left ureteral calculus without associated hydronephrosis.    BLADDER: Diffuse wall thickening, slightly asymmetric on the left may be related to urinary tract infection. Consider follow-up cystoscopy.  REPRODUCTIVE ORGANS: Prostate is normal in size. No evidence of a prostatic abscess as questioned.    BOWEL: No bowel obstruction.  PERITONEUM: Small volume ascites.  VESSELS: Atherosclerotic changes.  RETROPERITONEUM/LYMPH NODES: No lymphadenopathy.  ABDOMINAL WALL: Within normal limits.  BONES: Degenerative changes.    IMPRESSION:  A 5 mm distal left ureteral calculus. No associated hydronephrosis.    Diffuse wall thickening, slightly asymmetric on the left may be related to urinary tract infection. Consider follow-up cystoscopy.    No evidence of a prostatic abscess as questioned.    Findings were discussed with Dr. Lucio 12/15/2020 2:27 PM by Dr. Parks with read back confirmation.        EXAM:  US DPLX ABDOMEN    PROCEDURE DATE:  12/15/2020    INTERPRETATION:  CLINICAL INFORMATION: Status post liver transplant 7/2/2020. Elevated LFTs. Evaluate for portal vein thrombosis.  COMPARISON: CT abdomen/pelvis 12/15/2020.  TECHNIQUE: Color and spectral Doppler evaluation of the hepatic vasculature.  FINDINGS:  Homogeneous echogenicity of the liver transplant. No intrahepatic biliary ductal dilatation. Trace perihepatic fluid.    Main Portal Vein: Hepatopetal flow, 63 cm/s. 1.0 cm maximum diameter.  Left Portal Vein: Patent with hepatopetal flow.  Anterior Right Portal Vein: Patent with hepatopetal flow.  Posterior Right Portal Vein: Patent with hepatopetal flow.    Main Hepatic Artery: Normal direction of flow, peak systolic velocity 205-324 cm/s. RI 0.71.  Right anterior hepatic artery: Peak systolic velocity 63 cm/s. RI 0.50  Right posterior hepatic artery: Peak systolic velocity 173 cm/s. RI 0.60  Left hepatic artery: Peak systolic velocity 70 cm/s. RI 0.56.  No tardus parvus waveforms.    Hepatic Veins and Inferior Vena Cava: Patent with normal direction of flow.    Trace ascites.    IMPRESSION: Status post liver transplant. Elevated velocities in the main hepatic artery, increased from the prior ultrasound examination. No distal tardus parvus waveforms. Close interval follow-up is recommended. Patent portal veins.    PROCEDURE: Transthoracic echocardiogram with 2-D, M-Mode  and complete spectral and color flow Doppler.  INDICATION: Cardiac murmur, unspecified (R01.1)  ------------------------------------------------------------------------  Dimensions:    Normal Values:  LA:     4.7    2.0 - 4.0 cm  Ao:     3.1    2.0 - 3.8 cm  SEPTUM: 0.7    0.6 - 1.2 cm  PWT:    0.8    0.6 - 1.1 cm  LVIDd:  5.5    3.0 - 5.6 cm  LVIDs:  3.7    1.8 - 4.0 cm  Derived variables:  LVMI: 63 g/m2  RWT: 0.29  Fractional short: 33 %  EF (Visual Estimate): 60-65 %  EF (Teicholtz): 61 %  Doppler Peak Velocity (m/sec): AoV=2.4  ------------------------------------------------------------------------  Conclusions:  1. Mitral annular calcification and calcified mitral  leaflets with normal diastolic opening.  2. Calcified trileaflet aortic valve with decreased  opening. Peak transaortic valve gradient equals 23 mm Hg,  mean transaortic valve gradient equals 13 mm Hg, estimated  aortic valve area equals 1.6 sqcm (by continuity equation),  aortic valve velocity time integral equals 47 cm,  consistent with mild aortic stenosis.  3. Normal left ventricular internal dimensions and wall  thicknesses.  4. Normal left ventricular systolic function. No segmental  wall motion abnormalities.  5. Estimated pulmonary artery systolic pressure equals 37  mm Hg, assuming right atrial pressure equals 6 - 10 mm Hg,  consistent with borderline pulmonary pressures.  6. No obvious evidence of endocarditis upon review of  suboptimal images. Consider additional imaging/modalities  if clinically indicated weth a high suspcion.  *** Compared with echocardiogram of 7/15/2020, no  significant changes.  ---------------------------------------      Review of systems  Gen: tired  Skin:  R heel wound   Head/Eyes/Ears/Mouth: No headache; Normal hearing; Normal vision w/o blurriness; No sinus pain/discomfort, sore throat  Respiratory: No dyspnea, cough, wheezing, hemoptysis  CV: No chest pain, PND, orthopnea  GI:  denies abdominal pain, diarrhea, constipation, nausea, vomiting, melena, hematochezia  : No increased frequency, dysuria, hematuria, nocturia  MSK: No joint pain/swelling; no back pain; no edema  Neuro: No dizziness/lightheadedness, weakness, seizures, numbness, tingling  Heme: No easy bruising or bleeding  Endo: No heat/cold intolerance  Psych: No significant nervousness, anxiety, stress, depression  All other systems were reviewed and are negative, except as noted.      PHYSICAL EXAM:  Constitutional: Well developed / well nourished  Eyes: Anicteric, PERRLA  ENMT: nc/at  Respiratory: CTA B/L  Cardiovascular: RRR, + heart murmur  Gastrointestinal: Soft, non distended, non tender, incision well healed  Genitourinary: Voiding spontaneously  Extremities: SCD's in place and working bilaterally  Vascular: Palpable dp pulses bilaterally  Neurological: A&O x3  Skin: R heel ulcer: no drainage, no cellulitis   Musculoskeletal: Moving all extremities  Psychiatric: Responsive

## 2020-12-16 NOTE — PROGRESS NOTE ADULT - ASSESSMENT
65 yr old man with liver transplant in July 2020, right plantar osteo s/p debridement on IV cefepime for ~2 weeks.   He is admitted with MISA. Serum creatinine was 1.3-1.5mg/dL on previous admission few weeks ago  BUN/Cr continue to rise 63/4.9 today, urine lytes c/w ATN with high FeNA and FeUrea. He is non oliguric. Volume status is fair. He has worsening metabolic acidosis but remainder of electrolytes are fair.   Acute interstitial nephritis due to cefepime also possible. No peripheral eosinophilia.   Sepsis likely from urinary source. Cdiff +ve bit diarrhea has improved. Cultures so far negative.   Non obstructive distal ureteric stone on imaging       Plan   - D/c bactrim  - D/c Lasix,  encourage po intake   - Start sodium bicarb 650mg po TID   - Continue meropenem,  F/u cultures          65 yr old man with liver transplant in July 2020, right plantar osteo s/p debridement on IV cefepime for ~2 weeks.   He is admitted with MISA. Serum creatinine was 1.3-1.5mg/dL on previous admission few weeks ago  BUN/Cr continue to rise 63/4.9 today, urine lytes c/w ATN with high FeNA and FeUrea. He is non oliguric. Volume status is fair. He has worsening metabolic acidosis but remainder of electrolytes are fair.   Acute interstitial nephritis due to cefepime also possible. No peripheral eosinophilia.   Sepsis likely from urinary source. Cdiff +ve but diarrhea has improved. Cultures so far negative.   Non obstructive distal ureteric stone on imaging       Plan   - D/c bactrim  - D/c Lasix,  encourage po intake   - Start sodium bicarb 650mg po TID   - Continue meropenem,  F/u cultures          65 yr old man with liver transplant in July 2020, right plantar osteo s/p debridement on IV cefepime for ~2 weeks.   He is admitted with MISA. Serum creatinine was 1.3-1.5mg/dL on previous admission few weeks ago  BUN/Cr continue to rise 63/4.9 today, urine lytes c/w ATN with high FeNA and FeUrea. He is non oliguric. Volume status is fair. He has worsening metabolic acidosis but remainder of electrolytes are fair.   Acute interstitial nephritis due to cefepime also possible. No peripheral eosinophilia.   Sepsis likely from urinary source. Cdiff +ve but diarrhea has improved. Cultures so far negative.   Non obstructive distal ureteric stone on imaging   RLE edema r/o DVT       Plan   - D/c bactrim  - D/c Lasix,  encourage po intake   - Start sodium bicarb 650mg po TID   - Continue meropenem,  F/u cultures   - RLE duplex to r/o DVT

## 2020-12-16 NOTE — CONSULT NOTE ADULT - ASSESSMENT
64 year old man type 2 diabetes, HLD, GERD, HFpEF with mild LV diastolic dysfunction, and decompensated JEAN cirrhosis, duodenal ulcer, GAVE and duodenal AVM s/p APC (last on 10/11/19) and SHENG who is now s/p liver transplant (7/2/20) with post-op course c/b tacrolimus toxicity and hospital delirium improving after stopping CNI and replacing w/ everolimus. Patient recently presented 11/20/20 with worsening LE swelling and right foot wound with wound culture 11/24 growing S. epidermidis and calcaneus bone biopsy culture 11/24 demonstrating osteomyelitis sent home with cefepime. Patient now with sepsis and MISA likely secondary to cdiff.    Impression:   # Cdiff: on po vanco now. WBC wnl. Diarrhea quantity improving  # MISA: nonoliguric. FeNa and FeUrea suggest possible AIN (from cefepime) vs acute tubular necrosis likely from cdiff and volume loss.  # Right foot osteomyelitis:  s/p surgical debridement and graft placement on 11/24 by Podiatry. Wound culture 11/24 growing S. epidermidis - likely contaminant; calcaneus bone biopsy culture 11/24 obtained intraoperatively demonstrates osteomyelitis. Had PICC line, now removed and sent for culture.  # S/p liver transplant 7/2/20: Transaminases and INR mildly elevated likely in setting of infection.   Recipient Info:   ABO: A  CMV:  Neg  EBV Positive  Donor:  Donor ID:  [id number] Match: [id number]  Age: 29 ABO:  A1 High Risk:   No COD: Cardiovascular  Anti CMV positive, EBV IgG- positive  HepBcAb-neg, Hepatitis C-DANA- neg, Hepatitis C ab-neg  # Bladder wall thickening  # Type 2 diabetes  # SHENG     Recommendations:  - continue with po vanco, can consider increased dose given on immunosuppression  - continue with meropenem  - consider d/c dapto  - await transplant ID reccs  - hold bactrim given MISA  - immunosuppression per transplant surgery; continue with everolimus and low dose prednisone  - hold diuretics  - appreciate transplant nephrology reccs  - monitor CBC, CMP, INR  - transplant hepatology to follow

## 2020-12-16 NOTE — CONSULT NOTE ADULT - ATTENDING COMMENTS
Post OLT 7/2/20  Post-op CNS toxicity suspected to be from CNI. Switched to EVR + MMF.  Then developed osteomyelitis of R foot and placed on broad abx.  Now Cdiff + and febrile with MISA. Holding MMF and only on EVR + prednisone 5.  Consider descalate abx per ID and continue PO vancomycin for Cdiff.  Liver enzymes mildly elevated, will continue to monitor.  Suspect ATN related to Cdiff infection but making urine. Hold Bactrim.

## 2020-12-16 NOTE — PHYSICAL THERAPY INITIAL EVALUATION ADULT - PERTINENT HX OF CURRENT PROBLEM, REHAB EVAL
as per chart review: PMHx of IDDM, HLD, obesity, HFpEF with mild LV diastolic dysfunction, MGUS, chronic anemia with a history of duodenal ulcer 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 Underwent OLT on 7/2/2020, post op coure c/b VRE bacteremia tx with linezolid and delirium likely in setting of CNI toxicity (FK discontinued/Everolimus initiated 7/27). Patient C Diff positive

## 2020-12-16 NOTE — CONSULT NOTE ADULT - SUBJECTIVE AND OBJECTIVE BOX
Chief Complaint:  Patient is a 65y old  Male who presents with a chief complaint of transaminitis (16 Dec 2020 10:09)      HPI: Pt is a 64yo M with PMH of IDDM, HLD, obesity, HFpEF with mild LV diastolic dysfunction, MGUS, chronic anemia with a history of duodenal ulcer as well as GAVE and duodenal AVM s/p APC (last on 10/11/19), decompensated JEAN/Cirrhosis who is s/p OLT on 2020, post op coure c/b VRE bacteremia tx with linezolid and delirium likely in setting of CNI toxicity (FK discontinued/Everolimus initiated ). Patient with recent admission from - for OM of the R heel, s/p debridement with Podiatry, and was discharged with Cefepime x 6 weeks via PICC line (placed ). Initial wound cx on admission grew Pseudomonas, OR cx grew staph epi.  He now presents with from clinic rising liver enzymes and MISA (SCr 3.3 from 1.5) with fevers c/f sepsis. Patient having diarrhea found to be cdiff positive.       Allergies:  codeine (Anaphylaxis)      Home Medications:    Hospital Medications:  aspirin  chewable 81 milliGRAM(s) Oral daily  DAPTOmycin IVPB      dextrose 40% Gel 15 Gram(s) Oral once  dextrose 5%. 1000 milliLiter(s) IV Continuous <Continuous>  dextrose 5%. 1000 milliLiter(s) IV Continuous <Continuous>  dextrose 50% Injectable 25 Gram(s) IV Push once  dextrose 50% Injectable 12.5 Gram(s) IV Push once  dextrose 50% Injectable 25 Gram(s) IV Push once  everolimus (ZORTRESS) 2 milliGRAM(s) Oral <User Schedule>  glucagon  Injectable 1 milliGRAM(s) IntraMuscular once  heparin   Injectable 5000 Unit(s) SubCutaneous every 12 hours  influenza   Vaccine 0.5 milliLiter(s) IntraMuscular once  insulin glargine Injectable (LANTUS) 10 Unit(s) SubCutaneous at bedtime  insulin lispro (ADMELOG) corrective regimen sliding scale   SubCutaneous three times a day before meals  insulin lispro (ADMELOG) corrective regimen sliding scale   SubCutaneous at bedtime  magnesium oxide 800 milliGRAM(s) Oral daily  magnesium sulfate  IVPB 1 Gram(s) IV Intermittent once  meropenem  IVPB 500 milliGRAM(s) IV Intermittent every 12 hours  meropenem  IVPB      nystatin    Suspension [id number] Unit(s) Oral four times a day  pantoprazole    Tablet 40 milliGRAM(s) Oral before breakfast  predniSONE   Tablet 5 milliGRAM(s) Oral daily  sodium bicarbonate 650 milliGRAM(s) Oral three times a day  tamsulosin 0.4 milliGRAM(s) Oral at bedtime  valGANciclovir 450 milliGRAM(s) Oral <User Schedule>  vancomycin    Solution 125 milliGRAM(s) Oral every 6 hours      PMHX/PSHX:  GIB (gastrointestinal bleeding)    GERD with esophagitis    Hepatic encephalopathy    Obesity    Fatty liver disease, nonalcoholic    Renal stones    Hypertension    Neuropathy    Hypercholesteremia    Diabetes    S/P cholecystectomy    No significant past surgical history        Family history:  Family history of type 2 diabetes mellitus    Family history of hypertension    Family history of stomach cancer    No pertinent family history in first degree relatives        Social History:     ROS: As per HPI, 14-point ROS negative otherwise.    General:  No wt loss, fevers, chills, night sweats, fatigue,   Eyes:  Good vision, no reported pain  ENT:  No sore throat, pain, runny nose, dysphagia  CV:  No pain, palpitations, hypo/hypertension  Resp:  No dyspnea, cough, tachypnea, wheezing  GI:  See HPI  :  No pain, bleeding, incontinence, nocturia  Muscle:  No pain, weakness  Neuro:  No weakness, tingling, memory problems  Psych:  No fatigue, insomnia, mood problems, depression  Endocrine:  No polyuria, polydipsia, cold/heat intolerance  Heme:  No petechiae, ecchymosis, easy bruisability  Skin:  No rash, edema      PHYSICAL EXAM:     Vital Signs:  Vital Signs Last 24 Hrs  T(C): 37.3 (16 Dec 2020 09:00), Max: 38.1 (16 Dec 2020 01:00)  T(F): 99.2 (16 Dec 2020 09:00), Max: 100.5 (16 Dec 2020 01:00)  HR: 91 (16 Dec 2020 09:00) (91 - 98)  BP: 141/67 (16 Dec 2020 09:00) (118/66 - 141/67)  BP(mean): --  RR: 18 (16 Dec 2020 09:00) (16 - 18)  SpO2: 97% (16 Dec 2020 09:00) (95% - 98%)  Daily     Daily Weight in k.5 (16 Dec 2020 05:00)    GENERAL:  Appears stated age, well-groomed, well-nourished, no distress  HEENT:  NC/AT,  conjunctivae clear and pink  CHEST:  Full & symmetric excursion, no increased effort  HEART:  Regular rhythm, no JVD  ABDOMEN:  Soft, non-tender, non-distended, normoactive bowel sounds,  no masses , no hepatosplenomegaly  EXTREMITIES:  no cyanosis, clubbing or edema  SKIN:  No rash/erythema/ecchymoses/petechiae/wounds/abscess/warm/dry  NEURO:  Alert, oriented, nonfocal    LABS:                        8.0    9.78  )-----------( 153      ( 16 Dec 2020 05:39 )             24.6     12-    133<L>  |  103  |  63<H>  ----------------------------<  132<H>  4.1   |  15<L>  |  4.93<H>    Ca    8.7      16 Dec 2020 05:39  Phos  2.9     12-  Mg     1.9     -    TPro  6.0  /  Alb  2.8<L>  /  TBili  0.2  /  DBili  x   /  AST  60<H>  /  ALT  80<H>  /  AlkPhos  196<H>  12-    LIVER FUNCTIONS - ( 16 Dec 2020 05:39 )  Alb: 2.8 g/dL / Pro: 6.0 g/dL / ALK PHOS: 196 U/L / ALT: 80 U/L / AST: 60 U/L / GGT: x           PT/INR - ( 16 Dec 2020 05:39 )   PT: 15.8 sec;   INR: 1.33 ratio         PTT - ( 15 Dec 2020 06:18 )  PTT:27.6 sec  Urinalysis Basic - ( 15 Dec 2020 03:55 )    Color: Yellow / Appearance: Slightly Turbid / S.013 / pH: x  Gluc: x / Ketone: Negative  / Bili: Negative / Urobili: Negative   Blood: x / Protein: 300 mg/dL / Nitrite: Negative   Leuk Esterase: Large / RBC: 7 /hpf /  /HPF   Sq Epi: x / Non Sq Epi: 0 /hpf / Bacteria: Negative          Imaging:  r< from: US Abdomen Doppler (12.15.20 @ 13:48) >    IMPRESSION:  Status post liver transplant. Elevated velocities in the main hepatic artery, increased from the prior ultrasound examination. No distal tardus parvus waveforms. Close interval follow-up is recommended.    Patent portal veins.      < end of copied text >  < from: CT Abdomen and Pelvis No Cont (12.15.20 @ 12:48) >  IMPRESSION:  A 5 mm distal left ureteral calculus. No associated hydronephrosis.    Diffuse wall thickening, slightly asymmetric on the left may be related to urinary tract infection. Consider follow-up cystoscopy.    No evidence of a prostatic abscess as questioned.    Findings were discussed with Dr. Lucio 12/15/2020 2:27 PM by Dr. Parks with read back confirmation.          < end of copied text >

## 2020-12-16 NOTE — PROGRESS NOTE ADULT - SUBJECTIVE AND OBJECTIVE BOX
[facility] DIVISION OF KIDNEY DISEASES AND HYPERTENSION -- FOLLOW UP NOTE  --------------------------------------------------------------------------------  Authored by: Adele Rowland   Cell # [phone]     JESSICA MARTIN was seen and examined at bedside.     HPI: 65 yr old man with history of JEAN/Cirrhosis 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 on immunosuppression with Everolimus and CellCept. He is admitted with MISA and new transaminitis.     PMH: DM on insulin, hyperlipidemia, mild diastolic dysfunction, MGUS, chronic anemia, duodenal ulcer.   Post liver transplant he was unable to tolerate CNI due to neurotoxicity/delerium and was swtiched to Everolimus.   He has non healing left heal ulcer that was recently complicated by osteomyelitis s/p debridement by podiatry, he was on IV cefepime x 6 week course of which he completed 2 weeks.     Febrile to 101 overnight, afebrile this AM.   C.diff +ve, no diarrhea overnight   CT scan with distal ureteric stone, no hydro and bladder thickening   Echo with no significant valvular disease, normal EF and no vegetation   Cultures so far negative   UOP 1.2 liters/24 hours  -130 systolic  Received lasix 40mg iv this AM       Standing Inpatient Medications  aspirin  chewable 81 milliGRAM(s) Oral daily  DAPTOmycin IVPB      everolimus (ZORTRESS) 2 milliGRAM(s) Oral <User Schedule>  glucagon  Injectable 1 milliGRAM(s) IntraMuscular once  heparin   Injectable 5000 Unit(s) SubCutaneous every 12 hours  influenza   Vaccine 0.5 milliLiter(s) IntraMuscular once  insulin glargine Injectable (LANTUS) 10 Unit(s) SubCutaneous at bedtime  insulin lispro (ADMELOG) corrective regimen sliding scale   SubCutaneous three times a day before meals  insulin lispro (ADMELOG) corrective regimen sliding scale   SubCutaneous at bedtime  magnesium oxide 800 milliGRAM(s) Oral daily  meropenem  IVPB 500 milliGRAM(s) IV Intermittent every 12 hours    nystatin    Suspension 420304 Unit(s) Oral four times a day  pantoprazole    Tablet 40 milliGRAM(s) Oral before breakfast  predniSONE   Tablet 5 milliGRAM(s) Oral daily  sodium bicarbonate 650 milliGRAM(s) Oral three times a day  tamsulosin 0.4 milliGRAM(s) Oral at bedtime  valGANciclovir 450 milliGRAM(s) Oral <User Schedule>  vancomycin    Solution 125 milliGRAM(s) Oral every 6 hours    VITALS/PHYSICAL EXAM  --------------------------------------------------------------------------------  T(C): 37.3 (12-16-20 @ 09:00), Max: 38.1 (12-16-20 @ 01:00)  HR: 91 (12-16-20 @ 09:00) (91 - 98)  BP: 141/67 (12-16-20 @ 09:00) (118/66 - 141/67)  RR: 18 (12-16-20 @ 09:00) (16 - 18)  SpO2: 97% (12-16-20 @ 09:00) (95% - 98%)  Height (cm): 185.4 (12-14-20 @ 17:15)  Weight (kg): 113.4 (12-14-20 @ 17:15)  BMI (kg/m2): 33 (12-14-20 @ 17:15)  BSA (m2): 2.37 (12-14-20 @ 17:15)      12-15-20 @ 07:01  -  12-16-20 @ 07:00  --------------------------------------------------------  IN: 4310 mL / OUT: 1375 mL / NET: 2935 mL    12-16-20 @ 07:01  -  12-16-20 @ 09:50  --------------------------------------------------------  IN: 200 mL / OUT: 250 mL / NET: -50 mL      Physical Exam:  Awake and alert, pale, + asterixes   moist oral mucosa  no JVD  lungs clear b/l  Systolic murmur at apex   Abdomen obese, soft, non tender  RLE trace edema, LLE no edema, right heel dressing         LABS/STUDIES  --------------------------------------------------------------------------------              8.0    9.78  >-----------<  153      [12-16-20 @ 05:39]              24.6     133  |  103  |  63  ----------------------------<  132      [12-16-20 @ 05:39]  4.1   |  15  |  4.93        Ca     8.7     [12-16-20 @ 05:39]      Mg     1.9     [12-16-20 @ 05:39]      Phos  2.9     [12-16-20 @ 05:39]    TPro  6.0  /  Alb  2.8  /  TBili  0.2  /  DBili  x   /  AST  60  /  ALT  80  /  AlkPhos  196  [12-16-20 @ 05:39]    PT/INR: PT 15.8 , INR 1.33       [12-16-20 @ 05:39]  PTT: 27.6       [12-15-20 @ 06:18]      Creatinine Trend:  SCr 4.93 [12-16 @ 05:39]  SCr 3.91 [12-15 @ 06:21]  SCr 3.68 [12-15 @ 03:55]  SCr 3.33 [12-14 @ 17:35]  SCr 1.57 [12-02 @ 06:29]      CAPILLARY BLOOD GLUCOSE  POCT Blood Glucose.: 146 mg/dL (16 Dec 2020 08:40)  POCT Blood Glucose.: 150 mg/dL (16 Dec 2020 01:41)  POCT Blood Glucose.: 153 mg/dL (15 Dec 2020 21:24)  POCT Blood Glucose.: 166 mg/dL (15 Dec 2020 17:20)  POCT Blood Glucose.: 149 mg/dL (15 Dec 2020 12:08)      Urinalysis - [12-15-20 @ 03:55]      Color Yellow / Appearance Slightly Turbid / SG 1.013 / pH 6.0      Gluc 200 mg/dL / Ketone Negative  / Bili Negative / Urobili Negative       Blood Moderate / Protein 300 mg/dL / Leuk Est Large / Nitrite Negative      RBC 7 /  / Hyaline 0 / Gran  / Sq Epi  / Non Sq Epi 0 / Bacteria Negative    Urine Creatinine 67      [12-15-20 @ 11:39]  Urine Protein 193      [12-15-20 @ 14:51]  Urine Sodium 44      [12-15-20 @ 11:39]  Urine Urea Nitrogen 367      [12-15-20 @ 15:35]  Urine Chloride 43      [12-15-20 @ 11:39]  Urine Osmolality 311      [12-15-20 @ 11:39]    Iron 160, TIBC Unable to calculate Test Repeated, %sat Unable to calculate Test Repeated      [06-12-20 @ 09:04]  Ferritin 493      [04-29-20 @ 08:20]  HbA1c 6.4      [02-12-20 @ 17:08]  TSH 3.26      [04-26-20 @ 09:47]  Lipid: chol 158, , HDL 44, LDL 93      [08-05-20 @ 08:57]

## 2020-12-16 NOTE — PROGRESS NOTE ADULT - ATTENDING COMMENTS
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, CNI Neurotoxicity), IDDM, hyperlipidemia, obesity, HFpEF with mild LV diastolic dysfunction, MGUS who presented with fevers, diarrhea and abnormal labwork    Of note, recent admission for R Heel Osteomyelitis   s/p right foot heel incision and drainage w/ application of stravix and bone biopsy on 11/24  Superficial cultures with Pseudomonas and Operative Cultures with Coag Neg Staph in fluid media  Discharged on IV Cefepime with plan for 6 week course    On 12/14 labwork was noted to have MISA and Transaminitis  Of note [facility] outpatient labs from 12/9 with Cr of 1.41 and LFT's WNL    U/A here with pyuria, UCx with NGTD  COVID19 PCR Negative  C diff toxin assay indeterminate with Positive C diff PCR  CXR Clear   CT A/P with distal left ureteral calculus without hydronephrosis and Diffuse wall thickening, slightly asymmetric on the left but without prostate abscess.     The patient's heel appears without cellulitis or drainage so I doubt that this is the source of his fever. Suspect that C diff +/- UTI/Pyelonephritis is most likely    Given the negative UCx believe we can discontinue Daptomycin (No VRE growth)  Would continue Meropenem at this point (given prior ESBL E coli recurrent UTI)  Would dose decrease Meropenem as per fellow note  Would continue PO Vancomycin  If continued rise in Cr would obtain Renal US to exclude developing hydronephrosis at his left ureteral calculus    Overall, C diff infection, Fever, Transaminitis, MISA, Abnormal Urinalysis, Nephrolithiasis, Heel Osteomyelitis, Liver Transplant Recipient    I will continue to follow. Please feel free to contact me with any further questions.    Eusebio Pérez M.D.  [facility] Division of Infectious Disease  8AM-5PM: Pager Number [phone]  After Hours (or if no response): Please contact the Infectious Diseases Office at [phone]     The above assessment and plan were discussed with transplant surgery team

## 2020-12-16 NOTE — PROGRESS NOTE ADULT - ASSESSMENT
Assessment:  65 year old male with past medical history of OLT (CMV +/-), IDDM, hyperlipidemia, obesity, HFpEF with mild LV diastolic dysfunction, MGUS, chronic anemia with a history of duodenal ulcer as well as GAVE and duodenal AVM s/p APC (last on 10/11/19), decompensated JEAN/cirrhosis who underwent OLT on 7/2/2020, with post-op course complicated by VRE bacteremia that was treated with linezolid and delirium likely in setting of CNI toxicity (FK discontinued/Everolimus initiated 7/27). He was sent in from the clinic after lab work revealed new transaminitis and acute kidney injury. He was also complaining of 24 hours of diarrhea and associated fatigue. Infectious disease was consulted for evaluation of right foot osteomyelitis.    Plan:  # C. diff colitis  - Discontinue intravenous daptomycin   - Continue oral vancomycin     # History of pseudomonas wound infection and VRE  - Decrease intravenous meropenem dose to 500 mg IV q12 hours   - CT abdomen and pelvis and renal ultrasound was reviewed   - Consider repeat imaging for worsening kidney function  - Monitor fever curve  - Trend CBC daily  - ID will continue to follow    Charlie Lafleur MD PGY-4   Fellow, Infectious Diseases   Pager: [phone]  If no response, after 5pm and on weekends: Call [phone]     Assessment:  65 year old male with past medical history of OLT (CMV +/-), IDDM, hyperlipidemia, obesity, HFpEF with mild LV diastolic dysfunction, MGUS, chronic anemia with a history of duodenal ulcer as well as GAVE and duodenal AVM s/p APC (last on 10/11/19), decompensated JEAN/cirrhosis who underwent OLT on 7/2/2020, with post-op course complicated by VRE bacteremia that was treated with linezolid and delirium likely in setting of CNI toxicity (FK discontinued/Everolimus initiated 7/27). He was sent in from the clinic after lab work revealed new transaminitis and acute kidney injury. He was also complaining of 24 hours of diarrhea and associated fatigue. Infectious disease was consulted for evaluation of right foot osteomyelitis.    Plan:  # C. diff colitis  - Discontinue intravenous daptomycin   - Continue oral vancomycin 125 mg PO q6 hours    # History of pseudomonas wound infection and VRE  - Decrease intravenous meropenem dose to 500 mg IV q12 hours   - CT abdomen and pelvis and renal ultrasound was reviewed   - Consider repeat imaging for worsening kidney function  - Monitor fever curve  - Trend CBC daily  - ID will continue to follow    # History of liver transplant  - Continue oral valcyte three times weekly and oral nystatin swish and swallow    Charlie Lafleur MD PGY-4   Fellow, Infectious Diseases   Pager: [phone]  If no response, after 5pm and on weekends: Call [phone]     Assessment:  65 year old male with past medical history of OLT (CMV +/-), IDDM, hyperlipidemia, obesity, HFpEF with mild LV diastolic dysfunction, MGUS, chronic anemia with a history of duodenal ulcer as well as GAVE and duodenal AVM s/p APC (last on 10/11/19), decompensated JEAN/cirrhosis who underwent OLT on 7/2/2020, with post-op course complicated by VRE bacteremia that was treated with linezolid and delirium likely in setting of CNI toxicity (FK discontinued/Everolimus initiated 7/27). He was sent in from the clinic after lab work revealed new transaminitis and acute kidney injury. He was also complaining of 24 hours of diarrhea and associated fatigue. Infectious disease was consulted for evaluation of right foot osteomyelitis.    Plan:    # C. diff infection  - Continue oral vancomycin 125 mg PO q6 hours    # ?UTI / Nephrolithiasis / Fever  - Discontinue intravenous daptomycin   - Decrease intravenous meropenem dose to 500 mg IV q12 hours   - If continued rise in Cr would obtain Renal US to exclude developing hydronephrosis at his left ureteral calculus  - Follow up on prelim BCx    # Transaminitis - not improving with defervescence from infection, no hepatobiliary abnormalities on CT A/P  - ?Rejection - workup per transplant surgery team     # History of pseudomonas wound infection and VRE  - Decrease intravenous meropenem dose to 500 mg IV q12 hours   - CT abdomen and pelvis and renal ultrasound was reviewed   - Consider repeat imaging for worsening kidney function  - Monitor fever curve  - Trend CBC daily  - ID will continue to follow    # History of liver transplant  - Continue oral valcyte three times weekly and oral nystatin swish and swallow    Charlie Lafleur MD PGY-4   Fellow, Infectious Diseases   Pager: [phone]  If no response, after 5pm and on weekends: Call [phone]

## 2020-12-16 NOTE — PROGRESS NOTE ADULT - ATTENDING COMMENTS
agree with above  now afebrile and diarrhea improved on po vanco  liver enzymes very slowly trending down  creatinine still rising; will get renal US

## 2020-12-16 NOTE — PROGRESS NOTE ADULT - ASSESSMENT
65M with PMHx of IDDM, HLD, obesity, HFpEF with mild LV diastolic dysfunction, MGUS, chronic anemia with a history of duodenal ulcer 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  Underwent OLT on 7/2/2020, post op coure c/b VRE bacteremia tx with linezolid and delirium likely in setting of CNI toxicity (FK discontinued/Everolimus initiated 7/27).    Recent admission from 11/20-12/2 for OM of the R heel, s/p debridement with Podiatry, and was discharged with Cefepime x 6 weeks via PICC line (placed 12/1).   Initial wound cx on admission grew Pseudomonas, OR cx grew staph epi.  He now presents with from clinic rising transaminitis and MISA (SCr 3.3 from 1.5)    [] Febrile, r/o Sepsis  - Recent h/o R heel OM, s/p debridement. Podiatry consulted: no signs of infection  - ID:  Was on Cefepime to cover pseudomonas, now on Meropenem (previous ESBL E coli Prostate abscess and VRE bacteremia post-transplant). Will discuss continuing Daptomycin [id number] with ID.   - Continue oral Vancomycin for + CDiff PCR  - TTE w/o obvious evidence of TTE  - Diarrhea: no further diarrhea. CDiff PCR positive.  Continue PO Vanco.   CMV PCR sent 12/16 pending (CMV +/-, valcyte dose reduced to 450mg daily last admission in setting of leukopenia)  - FU PICC tip Cx  - FU BCx 12/14 ngtd, UCx pending    [] MISA   - SCr increasing 4.93 today, good UOP   (SCr 1.5 on discharge)  - Medications renally adjusted  - 5mm distal ureteral calculus, no hydro noted on CT scan yesterday  ( h/o L renal calculus on prior CT (0.7cm)  - Stop Lasix  - Start HCO3 650mg TID  - RLE duplex 2/2 increased edema    [] s/p OLT, now with transaminitis  - Everolimus 2mg bid, MMF on hold since last admission in setting of OM; Pred 5mg daily  - PPx: Valcyte  renally dosed.   Stop Bactrim  - Liver doppler with patent vessels     [] DM  - Controlled  - diabetic diet  - cont Lantus 10u qhs  - cont ISS

## 2020-12-16 NOTE — PROGRESS NOTE ADULT - SUBJECTIVE AND OBJECTIVE BOX
Follow Up:      Interval History/ROS:Patient is a 65y old  Male who presents with a chief complaint of transaminitis (16 Dec 2020 12:03)    Patient reports feeling better with improved diarrhea.     Allergies  codeine (Anaphylaxis)    Intolerances        ANTIMICROBIALS:  DAPTOmycin IVPB    meropenem  IVPB 500 every 12 hours  meropenem  IVPB    nystatin    Suspension 035243 four times a day  valGANciclovir 450 <User Schedule>  vancomycin    Solution 125 every 6 hours      OTHER MEDS:  aspirin  chewable 81 milliGRAM(s) Oral daily  dextrose 40% Gel 15 Gram(s) Oral once  dextrose 5%. 1000 milliLiter(s) IV Continuous <Continuous>  dextrose 5%. 1000 milliLiter(s) IV Continuous <Continuous>  dextrose 50% Injectable 25 Gram(s) IV Push once  dextrose 50% Injectable 12.5 Gram(s) IV Push once  dextrose 50% Injectable 25 Gram(s) IV Push once  everolimus (ZORTRESS) 2 milliGRAM(s) Oral <User Schedule>  glucagon  Injectable 1 milliGRAM(s) IntraMuscular once  heparin   Injectable 5000 Unit(s) SubCutaneous every 12 hours  influenza   Vaccine 0.5 milliLiter(s) IntraMuscular once  insulin glargine Injectable (LANTUS) 10 Unit(s) SubCutaneous at bedtime  insulin lispro (ADMELOG) corrective regimen sliding scale   SubCutaneous three times a day before meals  insulin lispro (ADMELOG) corrective regimen sliding scale   SubCutaneous at bedtime  magnesium oxide 800 milliGRAM(s) Oral daily  pantoprazole    Tablet 40 milliGRAM(s) Oral before breakfast  predniSONE   Tablet 5 milliGRAM(s) Oral daily  sodium bicarbonate 650 milliGRAM(s) Oral three times a day  tamsulosin 0.4 milliGRAM(s) Oral at bedtime      Vital Signs Last 24 Hrs  T(C): 37 (16 Dec 2020 13:00), Max: 38.1 (16 Dec 2020 01:00)  T(F): 98.6 (16 Dec 2020 13:00), Max: 100.5 (16 Dec 2020 01:00)  HR: 84 (16 Dec 2020 13:00) (84 - 98)  BP: 136/67 (16 Dec 2020 13:00) (120/72 - 141/67)  BP(mean): --  RR: 18 (16 Dec 2020 13:00) (16 - 18)  SpO2: 97% (16 Dec 2020 13:00) (95% - 98%)    REVIEW OF SYSTEMS  [  ] ROS unobtainable because:   [x] All other systems negative except as noted below:	    Constitutional:  [ ] fever [ ] chills  [ ] weight loss  [ ] weakness  Skin:  [ ] rash [ ] phlebitis	  Eyes: [ ] icterus [ ] pain  [ ] discharge	  ENMT: [ ] sore throat  [ ] thrush [ ] ulcers [ ] exudates  Respiratory: [ ] dyspnea [ ] hemoptysis [ ] cough [ ] sputum	  Cardiovascular:  [ ] chest pain [ ] palpitations [ ] edema	  Gastrointestinal:  [ ] nausea [ ] vomiting [ ] diarrhea [ ] constipation [ ] pain	  Genitourinary:  [ ] dysuria [ ] frequency [ ] hematuria [ ] discharge [ ] flank pain  [ ] incontinence  Musculoskeletal:  [ ] myalgias [ ] arthralgias [ ] arthritis  [ ] back pain  Neurological:  [ ] headache [ ] seizures  [ ] confusion/altered mental status  Psychiatric:  [ ] anxiety [ ] depression	  Endocrine:  [ ] adrenal [ ] thyroid  Allergic/Immunologic:	 [ ] transplant [ ] seasonal      PHYSICAL EXAM:  Constitutional: non-toxic, no distress  HEAD/EYES: anicteric, no conjunctival injection  ENT:  supple, no thrush  Cardiovascular:   normal S1, S2, no murmur, no edema  Respiratory:  clear BS bilaterally, no wheezes, no rales  GI:  soft, non-tender, normal bowel sounds  :  no bob, no CVA tenderness  Musculoskeletal:  no synovitis, normal ROM  Neurologic: awake and alert, normal strength, no focal findings  Skin:  no rash, no erythema, no phlebitis, + right lower extremity fibrotic wound without purulence, edema, or erythema   Heme/Onc: no lymphadenopathy   Psychiatric:  awake, alert, appropriate mood                 8.0    9.78  )-----------( 153      ( 16 Dec 2020 05:39 )             24.6       12-16    133<L>  |  103  |  63<H>  ----------------------------<  132<H>  4.1   |  15<L>  |  4.93<H>    Ca    8.7      16 Dec 2020 05:39  Phos  2.9     12-16  Mg     1.9     12-16    TPro  6.0  /  Alb  2.8<L>  /  TBili  0.2  /  DBili  x   /  AST  60<H>  /  ALT  80<H>  /  AlkPhos  196<H>  12-16      Urinalysis Basic - ( 15 Dec 2020 03:55 )    Color: Yellow / Appearance: Slightly Turbid / S.013 / pH: x  Gluc: x / Ketone: Negative  / Bili: Negative / Urobili: Negative   Blood: x / Protein: 300 mg/dL / Nitrite: Negative   Leuk Esterase: Large / RBC: 7 /hpf /  /HPF   Sq Epi: x / Non Sq Epi: 0 /hpf / Bacteria: Negative        MICROBIOLOGY:Culture Results:   <10,000 CFU/mL Normal Urogenital Shantel (12-15 @ 16:28)  Culture Results:   No growth to date. ( @ 23:13)  Culture Results:   No growth to date. ( @ 23:13)  Culture Results:   No growth to date. ( @ 23:13)      RADIOLOGY:     Follow Up: Patient is a 65y old  Male who presents with a chief complaint of transaminitis (16 Dec 2020 12:03).    Interval History/ROS:    Patient reports feeling better with resolution of loose stools. He denies fever or chills.     Allergies  codeine (Anaphylaxis)  Intolerances        ANTIMICROBIALS:  DAPTOmycin IVPB    meropenem  IVPB 500 every 12 hours  meropenem  IVPB    nystatin    Suspension [id number] four times a day  valGANciclovir 450 <User Schedule>  vancomycin    Solution 125 every 6 hours      OTHER MEDS:  aspirin  chewable 81 milliGRAM(s) Oral daily  dextrose 40% Gel 15 Gram(s) Oral once  dextrose 5%. 1000 milliLiter(s) IV Continuous <Continuous>  dextrose 5%. 1000 milliLiter(s) IV Continuous <Continuous>  dextrose 50% Injectable 25 Gram(s) IV Push once  dextrose 50% Injectable 12.5 Gram(s) IV Push once  dextrose 50% Injectable 25 Gram(s) IV Push once  everolimus (ZORTRESS) 2 milliGRAM(s) Oral <User Schedule>  glucagon  Injectable 1 milliGRAM(s) IntraMuscular once  heparin   Injectable 5000 Unit(s) SubCutaneous every 12 hours  influenza   Vaccine 0.5 milliLiter(s) IntraMuscular once  insulin glargine Injectable (LANTUS) 10 Unit(s) SubCutaneous at bedtime  insulin lispro (ADMELOG) corrective regimen sliding scale   SubCutaneous three times a day before meals  insulin lispro (ADMELOG) corrective regimen sliding scale   SubCutaneous at bedtime  magnesium oxide 800 milliGRAM(s) Oral daily  pantoprazole    Tablet 40 milliGRAM(s) Oral before breakfast  predniSONE   Tablet 5 milliGRAM(s) Oral daily  sodium bicarbonate 650 milliGRAM(s) Oral three times a day  tamsulosin 0.4 milliGRAM(s) Oral at bedtime      Vital Signs Last 24 Hrs  T(C): 37 (16 Dec 2020 13:00), Max: 38.1 (16 Dec 2020 01:00)  T(F): 98.6 (16 Dec 2020 13:00), Max: 100.5 (16 Dec 2020 01:00)  HR: 84 (16 Dec 2020 13:00) (84 - 98)  BP: 136/67 (16 Dec 2020 13:00) (120/72 - 141/67)  BP(mean): --  RR: 18 (16 Dec 2020 13:00) (16 - 18)  SpO2: 97% (16 Dec 2020 13:00) (95% - 98%)    REVIEW OF SYSTEMS  [  ] ROS unobtainable because:   [x] All other systems negative except as noted below:	    Constitutional:  [ ] fever [ ] chills  [ ] weight loss  [ ] weakness  Skin:  [ ] rash [ ] phlebitis	  Eyes: [ ] icterus [ ] pain  [ ] discharge	  ENMT: [ ] sore throat  [ ] thrush [ ] ulcers [ ] exudates  Respiratory: [ ] dyspnea [ ] hemoptysis [ ] cough [ ] sputum	  Cardiovascular:  [ ] chest pain [ ] palpitations [ ] edema	  Gastrointestinal:  [ ] nausea [ ] vomiting [ ] diarrhea [ ] constipation [ ] pain	  Genitourinary:  [ ] dysuria [ ] frequency [ ] hematuria [ ] discharge [ ] flank pain  [ ] incontinence  Musculoskeletal:  [ ] myalgias [ ] arthralgias [ ] arthritis  [ ] back pain  Neurological:  [ ] headache [ ] seizures  [ ] confusion/altered mental status  Psychiatric:  [ ] anxiety [ ] depression	  Endocrine:  [ ] adrenal [ ] thyroid  Allergic/Immunologic:	 [ ] transplant [ ] seasonal      PHYSICAL EXAM:  Constitutional: non-toxic, no distress  HEAD/EYES: anicteric, no conjunctival injection  ENT:  supple, no thrush  Cardiovascular:   normal S1, S2, no murmur, no edema  Respiratory:  clear BS bilaterally, no wheezes, no rales  GI:  soft, non-tender, normal bowel sounds  :  no bob, no CVA tenderness  Musculoskeletal:  no synovitis, normal ROM  Neurologic: awake and alert, normal strength, no focal findings  Skin:  no rash, no erythema, no phlebitis, + right lower extremity fibrotic wound without purulence, edema, or erythema   Heme/Onc: no lymphadenopathy   Psychiatric:  awake, alert, appropriate mood                 8.0    9.78  )-----------( 153      ( 16 Dec 2020 05:39 )             24.6       12-16    133<L>  |  103  |  63<H>  ----------------------------<  132<H>  4.1   |  15<L>  |  4.93<H>    Ca    8.7      16 Dec 2020 05:39  Phos  2.9     12-16  Mg     1.9     12-16    TPro  6.0  /  Alb  2.8<L>  /  TBili  0.2  /  DBili  x   /  AST  60<H>  /  ALT  80<H>  /  AlkPhos  196<H>  12-16      Urinalysis Basic - ( 15 Dec 2020 03:55 )    Color: Yellow / Appearance: Slightly Turbid / S.013 / pH: x  Gluc: x / Ketone: Negative  / Bili: Negative / Urobili: Negative   Blood: x / Protein: 300 mg/dL / Nitrite: Negative   Leuk Esterase: Large / RBC: 7 /hpf /  /HPF   Sq Epi: x / Non Sq Epi: 0 /hpf / Bacteria: Negative        MICROBIOLOGY:Culture Results:   <10,000 CFU/mL Normal Urogenital Shantel (12-15 @ 16:28)  Culture Results:   No growth to date. ( @ 23:13)  Culture Results:   No growth to date. ( @ 23:13)  Culture Results:   No growth to date. ( @ 23:13)      RADIOLOGY:     Follow Up: Patient is a 65y old  Male who presents with a chief complaint of transaminitis (16 Dec 2020 12:03).    Interval History/ROS:    Patient reports feeling better with resolution of loose stools. He denies fever or chills.     Allergies  codeine (Anaphylaxis)  Intolerances        ANTIMICROBIALS:  DAPTOmycin IVPB    meropenem  IVPB 500 every 12 hours  meropenem  IVPB    nystatin    Suspension [id number] four times a day  valGANciclovir 450 <User Schedule>  vancomycin    Solution 125 every 6 hours      OTHER MEDS:  aspirin  chewable 81 milliGRAM(s) Oral daily  dextrose 40% Gel 15 Gram(s) Oral once  dextrose 5%. 1000 milliLiter(s) IV Continuous <Continuous>  dextrose 5%. 1000 milliLiter(s) IV Continuous <Continuous>  dextrose 50% Injectable 25 Gram(s) IV Push once  dextrose 50% Injectable 12.5 Gram(s) IV Push once  dextrose 50% Injectable 25 Gram(s) IV Push once  everolimus (ZORTRESS) 2 milliGRAM(s) Oral <User Schedule>  glucagon  Injectable 1 milliGRAM(s) IntraMuscular once  heparin   Injectable 5000 Unit(s) SubCutaneous every 12 hours  influenza   Vaccine 0.5 milliLiter(s) IntraMuscular once  insulin glargine Injectable (LANTUS) 10 Unit(s) SubCutaneous at bedtime  insulin lispro (ADMELOG) corrective regimen sliding scale   SubCutaneous three times a day before meals  insulin lispro (ADMELOG) corrective regimen sliding scale   SubCutaneous at bedtime  magnesium oxide 800 milliGRAM(s) Oral daily  pantoprazole    Tablet 40 milliGRAM(s) Oral before breakfast  predniSONE   Tablet 5 milliGRAM(s) Oral daily  sodium bicarbonate 650 milliGRAM(s) Oral three times a day  tamsulosin 0.4 milliGRAM(s) Oral at bedtime      Vital Signs Last 24 Hrs  T(C): 37 (16 Dec 2020 13:00), Max: 38.1 (16 Dec 2020 01:00)  T(F): 98.6 (16 Dec 2020 13:00), Max: 100.5 (16 Dec 2020 01:00)  HR: 84 (16 Dec 2020 13:00) (84 - 98)  BP: 136/67 (16 Dec 2020 13:00) (120/72 - 141/67)  BP(mean): --  RR: 18 (16 Dec 2020 13:00) (16 - 18)  SpO2: 97% (16 Dec 2020 13:00) (95% - 98%)    REVIEW OF SYSTEMS  [  ] ROS unobtainable because:   [x] All other systems negative except as noted below:	    Constitutional:  [ ] fever [ ] chills  [ ] weight loss  [ ] weakness  Skin:  [ ] rash [ ] phlebitis	  Eyes: [ ] icterus [ ] pain  [ ] discharge	  ENMT: [ ] sore throat  [ ] thrush [ ] ulcers [ ] exudates  Respiratory: [ ] dyspnea [ ] hemoptysis [ ] cough [ ] sputum	  Cardiovascular:  [ ] chest pain [ ] palpitations [ ] edema	  Gastrointestinal:  [ ] nausea [ ] vomiting [ ] diarrhea [ ] constipation [ ] pain	  Genitourinary:  [ ] dysuria [ ] frequency [ ] hematuria [ ] discharge [ ] flank pain  [ ] incontinence  Musculoskeletal:  [ ] myalgias [ ] arthralgias [ ] arthritis  [ ] back pain  Neurological:  [ ] headache [ ] seizures  [ ] confusion/altered mental status  Psychiatric:  [ ] anxiety [ ] depression	  Endocrine:  [ ] adrenal [ ] thyroid  Allergic/Immunologic: [x] transplant [ ] seasonal      PHYSICAL EXAM:  Constitutional: non-toxic, no distress  HEAD/EYES: anicteric, no conjunctival injection  ENT:  supple, no thrush  Cardiovascular:   normal S1, S2, no murmur, no edema  Respiratory:  clear BS bilaterally, no wheezes, no rales  GI:  soft, non-tender, normal bowel sounds  :  no bob, no CVA tenderness  Musculoskeletal:  no synovitis, normal ROM  Neurologic: awake and alert, normal strength, no focal findings  Skin:  no rash, no erythema, no phlebitis, + right lower extremity fibrotic wound without purulence, edema, or erythema   Heme/Onc: no lymphadenopathy   Psychiatric:  awake, alert, appropriate mood                 8.0    9.78  )-----------( 153      ( 16 Dec 2020 05:39 )             24.6       12-16    133<L>  |  103  |  63<H>  ----------------------------<  132<H>  4.1   |  15<L>  |  4.93<H>    Ca    8.7      16 Dec 2020 05:39  Phos  2.9     12-16  Mg     1.9     12-16    TPro  6.0  /  Alb  2.8<L>  /  TBili  0.2  /  DBili  x   /  AST  60<H>  /  ALT  80<H>  /  AlkPhos  196<H>  12-16      Urinalysis Basic - ( 15 Dec 2020 03:55 )    Color: Yellow / Appearance: Slightly Turbid / S.013 / pH: x  Gluc: x / Ketone: Negative  / Bili: Negative / Urobili: Negative   Blood: x / Protein: 300 mg/dL / Nitrite: Negative   Leuk Esterase: Large / RBC: 7 /hpf /  /HPF   Sq Epi: x / Non Sq Epi: 0 /hpf / Bacteria: Negative        MICROBIOLOGY:Culture Results:   <10,000 CFU/mL Normal Urogenital Shantel (12-15 @ 16:28)  Culture Results:   No growth to date. ( @ 23:13)  Culture Results:   No growth to date. ( @ 23:13)  Culture Results:   No growth to date. ( @ 23:13)      RADIOLOGY:     Follow Up: Patient is a 65y old  Male who presents with a chief complaint of transaminitis (16 Dec 2020 12:03).    Interval History/ROS:    Patient reports feeling better with resolution of loose stools. He denies fever or chills.     Allergies  codeine (Anaphylaxis)  Intolerances        ANTIMICROBIALS:  DAPTOmycin IVPB    meropenem  IVPB 500 every 12 hours  meropenem  IVPB    nystatin    Suspension [id number] four times a day  valGANciclovir 450 <User Schedule>  vancomycin    Solution 125 every 6 hours      OTHER MEDS:  aspirin  chewable 81 milliGRAM(s) Oral daily  dextrose 40% Gel 15 Gram(s) Oral once  dextrose 5%. 1000 milliLiter(s) IV Continuous <Continuous>  dextrose 5%. 1000 milliLiter(s) IV Continuous <Continuous>  dextrose 50% Injectable 25 Gram(s) IV Push once  dextrose 50% Injectable 12.5 Gram(s) IV Push once  dextrose 50% Injectable 25 Gram(s) IV Push once  everolimus (ZORTRESS) 2 milliGRAM(s) Oral <User Schedule>  glucagon  Injectable 1 milliGRAM(s) IntraMuscular once  heparin   Injectable 5000 Unit(s) SubCutaneous every 12 hours  influenza   Vaccine 0.5 milliLiter(s) IntraMuscular once  insulin glargine Injectable (LANTUS) 10 Unit(s) SubCutaneous at bedtime  insulin lispro (ADMELOG) corrective regimen sliding scale   SubCutaneous three times a day before meals  insulin lispro (ADMELOG) corrective regimen sliding scale   SubCutaneous at bedtime  magnesium oxide 800 milliGRAM(s) Oral daily  pantoprazole    Tablet 40 milliGRAM(s) Oral before breakfast  predniSONE   Tablet 5 milliGRAM(s) Oral daily  sodium bicarbonate 650 milliGRAM(s) Oral three times a day  tamsulosin 0.4 milliGRAM(s) Oral at bedtime      Vital Signs Last 24 Hrs  T(C): 37 (16 Dec 2020 13:00), Max: 38.1 (16 Dec 2020 01:00)  T(F): 98.6 (16 Dec 2020 13:00), Max: 100.5 (16 Dec 2020 01:00)  HR: 84 (16 Dec 2020 13:00) (84 - 98)  BP: 136/67 (16 Dec 2020 13:00) (120/72 - 141/67)  BP(mean): --  RR: 18 (16 Dec 2020 13:00) (16 - 18)  SpO2: 97% (16 Dec 2020 13:00) (95% - 98%)    REVIEW OF SYSTEMS  [  ] ROS unobtainable because:   [x] All other systems negative except as noted below:	    Constitutional:  [ ] fever [ ] chills  [ ] weight loss  [ ] weakness  Skin:  [ ] rash [ ] phlebitis	  Eyes: [ ] icterus [ ] pain  [ ] discharge	  ENMT: [ ] sore throat  [ ] thrush [ ] ulcers [ ] exudates  Respiratory: [ ] dyspnea [ ] hemoptysis [ ] cough [ ] sputum	  Cardiovascular:  [ ] chest pain [ ] palpitations [ ] edema	  Gastrointestinal:  [ ] nausea [ ] vomiting [ ] diarrhea [ ] constipation [ ] pain	  Genitourinary:  [ ] dysuria [ ] frequency [ ] hematuria [ ] discharge [ ] flank pain  [ ] incontinence  Musculoskeletal:  [ ] myalgias [ ] arthralgias [ ] arthritis  [ ] back pain  Neurological:  [ ] headache [ ] seizures  [ ] confusion/altered mental status  Psychiatric:  [ ] anxiety [ ] depression	  Endocrine:  [ ] adrenal [ ] thyroid  Allergic/Immunologic: [x] transplant [ ] seasonal      PHYSICAL EXAM:  Constitutional: non-toxic, no distress  HEAD/EYES: anicteric, no conjunctival injection  ENT:  supple, no thrush  Cardiovascular:   normal S1, S2, no murmur, no edema  Respiratory:  clear BS bilaterally, no wheezes, no rales  GI:  soft, non-tender, normal bowel sounds  :  no bob, no CVA tenderness  Musculoskeletal:  no synovitis, normal ROM  Neurologic: awake and alert, normal strength, no focal findings  Skin:  no rash, no erythema, no phlebitis, + right lower extremity fibrotic wound without purulence, edema, or erythema   Heme/Onc: no lymphadenopathy   Psychiatric:  awake, alert, appropriate mood                 8.0    9.78  )-----------( 153      ( 16 Dec 2020 05:39 )             24.6       12-16    133<L>  |  103  |  63<H>  ----------------------------<  132<H>  4.1   |  15<L>  |  4.93<H>    Ca    8.7      16 Dec 2020 05:39  Phos  2.9     12-16  Mg     1.9     12-16    TPro  6.0  /  Alb  2.8<L>  /  TBili  0.2  /  DBili  x   /  AST  60<H>  /  ALT  80<H>  /  AlkPhos  196<H>  -      Urinalysis Basic - ( 15 Dec 2020 03:55 )    Color: Yellow / Appearance: Slightly Turbid / S.013 / pH: x  Gluc: x / Ketone: Negative  / Bili: Negative / Urobili: Negative   Blood: x / Protein: 300 mg/dL / Nitrite: Negative   Leuk Esterase: Large / RBC: 7 /hpf /  /HPF   Sq Epi: x / Non Sq Epi: 0 /hpf / Bacteria: Negative        MICROBIOLOGY:Culture Results:   <10,000 CFU/mL Normal Urogenital Shantel (12-15 @ 16:28)  Culture Results:   No growth to date. ( @ 23:13)  Culture Results:   No growth to date. ( @ 23:13)  Culture Results:   No growth to date. ( @ 23:13)      RADIOLOGY:    <The imaging below has been reviewed and visualized by me independently. Findings as detailed in report below>    EXAM:  CT ABDOMEN AND PELVIS                        PROCEDURE DATE:  12/15/2020    A 5 mm distal left ureteral calculus. No associated hydronephrosis.  Diffuse wall thickening, slightly asymmetric on the left may be related to urinary tract infection. Consider follow-up cystoscopy.  No evidence of a prostatic abscess as questioned.

## 2020-12-17 ENCOUNTER — APPOINTMENT (OUTPATIENT)
Dept: TRANSPLANT | Facility: CLINIC | Age: 65
End: 2020-12-17

## 2020-12-17 DIAGNOSIS — H60.90 UNSPECIFIED OTITIS EXTERNA, UNSPECIFIED EAR: ICD-10-CM

## 2020-12-17 LAB
ALBUMIN SERPL ELPH-MCNC: 2.6 G/DL — LOW (ref 3.3–5)
ALP SERPL-CCNC: 221 U/L — HIGH (ref 40–120)
ALT FLD-CCNC: 67 U/L — HIGH (ref 10–45)
AMMONIA BLD-MCNC: 31 UMOL/L — SIGNIFICANT CHANGE UP (ref 11–55)
ANION GAP SERPL CALC-SCNC: 17 MMOL/L — SIGNIFICANT CHANGE UP (ref 5–17)
APPEARANCE UR: ABNORMAL
APTT BLD: 28.4 SEC — SIGNIFICANT CHANGE UP (ref 27.5–35.5)
AST SERPL-CCNC: 43 U/L — HIGH (ref 10–40)
BACTERIA # UR AUTO: NEGATIVE — SIGNIFICANT CHANGE UP
BASOPHILS # BLD AUTO: 0.02 K/UL — SIGNIFICANT CHANGE UP (ref 0–0.2)
BASOPHILS NFR BLD AUTO: 0.3 % — SIGNIFICANT CHANGE UP (ref 0–2)
BILIRUB SERPL-MCNC: 0.2 MG/DL — SIGNIFICANT CHANGE UP (ref 0.2–1.2)
BILIRUB UR-MCNC: NEGATIVE — SIGNIFICANT CHANGE UP
BUN SERPL-MCNC: 72 MG/DL — HIGH (ref 7–23)
CALCIUM SERPL-MCNC: 8.5 MG/DL — SIGNIFICANT CHANGE UP (ref 8.4–10.5)
CHLORIDE SERPL-SCNC: 100 MMOL/L — SIGNIFICANT CHANGE UP (ref 96–108)
CMV DNA CSF QL NAA+PROBE: SIGNIFICANT CHANGE UP
CO2 SERPL-SCNC: 15 MMOL/L — LOW (ref 22–31)
COLOR SPEC: SIGNIFICANT CHANGE UP
CREAT SERPL-MCNC: 6.06 MG/DL — HIGH (ref 0.5–1.3)
CULTURE RESULTS: SIGNIFICANT CHANGE UP
DIFF PNL FLD: ABNORMAL
EOSINOPHIL # BLD AUTO: 0.03 K/UL — SIGNIFICANT CHANGE UP (ref 0–0.5)
EOSINOPHIL NFR BLD AUTO: 0.4 % — SIGNIFICANT CHANGE UP (ref 0–6)
EOSINOPHIL NFR URNS MANUAL: POSITIVE
EPI CELLS # UR: 0 /HPF — SIGNIFICANT CHANGE UP
EVEROLIMUS, WHOLE BLOOD RESULT: 8.9 NG/ML — HIGH (ref 3–8)
EVEROLIMUS, WHOLE BLOOD RESULT: 9.6 NG/ML — HIGH (ref 3–8)
GLUCOSE BLDC GLUCOMTR-MCNC: 164 MG/DL — HIGH (ref 70–99)
GLUCOSE BLDC GLUCOMTR-MCNC: 175 MG/DL — HIGH (ref 70–99)
GLUCOSE BLDC GLUCOMTR-MCNC: 218 MG/DL — HIGH (ref 70–99)
GLUCOSE BLDC GLUCOMTR-MCNC: 236 MG/DL — HIGH (ref 70–99)
GLUCOSE SERPL-MCNC: 158 MG/DL — HIGH (ref 70–99)
GLUCOSE UR QL: ABNORMAL
HCT VFR BLD CALC: 23.8 % — LOW (ref 39–50)
HGB BLD-MCNC: 7.8 G/DL — LOW (ref 13–17)
IMM GRANULOCYTES NFR BLD AUTO: 0.8 % — SIGNIFICANT CHANGE UP (ref 0–1.5)
INR BLD: 1.18 RATIO — HIGH (ref 0.88–1.16)
KETONES UR-MCNC: NEGATIVE — SIGNIFICANT CHANGE UP
LEUKOCYTE ESTERASE UR-ACNC: ABNORMAL
LYMPHOCYTES # BLD AUTO: 0.6 K/UL — LOW (ref 1–3.3)
LYMPHOCYTES # BLD AUTO: 7.6 % — LOW (ref 13–44)
MAGNESIUM SERPL-MCNC: 2.1 MG/DL — SIGNIFICANT CHANGE UP (ref 1.6–2.6)
MCHC RBC-ENTMCNC: 29.1 PG — SIGNIFICANT CHANGE UP (ref 27–34)
MCHC RBC-ENTMCNC: 32.8 GM/DL — SIGNIFICANT CHANGE UP (ref 32–36)
MCV RBC AUTO: 88.8 FL — SIGNIFICANT CHANGE UP (ref 80–100)
MONOCYTES # BLD AUTO: 0.68 K/UL — SIGNIFICANT CHANGE UP (ref 0–0.9)
MONOCYTES NFR BLD AUTO: 8.7 % — SIGNIFICANT CHANGE UP (ref 2–14)
NEUTROPHILS # BLD AUTO: 6.46 K/UL — SIGNIFICANT CHANGE UP (ref 1.8–7.4)
NEUTROPHILS NFR BLD AUTO: 82.2 % — HIGH (ref 43–77)
NITRITE UR-MCNC: NEGATIVE — SIGNIFICANT CHANGE UP
NRBC # BLD: 0 /100 WBCS — SIGNIFICANT CHANGE UP (ref 0–0)
PH UR: 6 — SIGNIFICANT CHANGE UP (ref 5–8)
PHOSPHATE SERPL-MCNC: 3.4 MG/DL — SIGNIFICANT CHANGE UP (ref 2.5–4.5)
PLATELET # BLD AUTO: 159 K/UL — SIGNIFICANT CHANGE UP (ref 150–400)
POTASSIUM SERPL-MCNC: 4 MMOL/L — SIGNIFICANT CHANGE UP (ref 3.5–5.3)
POTASSIUM SERPL-SCNC: 4 MMOL/L — SIGNIFICANT CHANGE UP (ref 3.5–5.3)
PROT ?TM UR-MCNC: 126 MG/DL — HIGH (ref 0–12)
PROT SERPL-MCNC: 5.9 G/DL — LOW (ref 6–8.3)
PROT UR-MCNC: ABNORMAL
PROTHROM AB SERPL-ACNC: 14.1 SEC — HIGH (ref 10.6–13.6)
RAPID RVP RESULT: SIGNIFICANT CHANGE UP
RBC # BLD: 2.68 M/UL — LOW (ref 4.2–5.8)
RBC # FLD: 13.9 % — SIGNIFICANT CHANGE UP (ref 10.3–14.5)
RBC CASTS # UR COMP ASSIST: 3 /HPF — SIGNIFICANT CHANGE UP (ref 0–4)
SARS-COV-2 RNA SPEC QL NAA+PROBE: SIGNIFICANT CHANGE UP
SODIUM SERPL-SCNC: 132 MMOL/L — LOW (ref 135–145)
SP GR SPEC: 1.01 — SIGNIFICANT CHANGE UP (ref 1.01–1.02)
SPECIMEN SOURCE: SIGNIFICANT CHANGE UP
UROBILINOGEN FLD QL: NEGATIVE — SIGNIFICANT CHANGE UP
WBC # BLD: 7.85 K/UL — SIGNIFICANT CHANGE UP (ref 3.8–10.5)
WBC # FLD AUTO: 7.85 K/UL — SIGNIFICANT CHANGE UP (ref 3.8–10.5)
WBC UR QL: 234 /HPF — HIGH (ref 0–5)

## 2020-12-17 PROCEDURE — 99232 SBSQ HOSP IP/OBS MODERATE 35: CPT | Mod: GC

## 2020-12-17 PROCEDURE — 99221 1ST HOSP IP/OBS SF/LOW 40: CPT

## 2020-12-17 PROCEDURE — 99232 SBSQ HOSP IP/OBS MODERATE 35: CPT | Mod: GC,24

## 2020-12-17 PROCEDURE — 99233 SBSQ HOSP IP/OBS HIGH 50: CPT

## 2020-12-17 RX ORDER — CHLORHEXIDINE GLUCONATE 213 G/1000ML
1 SOLUTION TOPICAL DAILY
Refills: 0 | Status: DISCONTINUED | OUTPATIENT
Start: 2020-12-17 | End: 2020-12-19

## 2020-12-17 RX ORDER — SODIUM BICARBONATE 1 MEQ/ML
1300 SYRINGE (ML) INTRAVENOUS THREE TIMES A DAY
Refills: 0 | Status: DISCONTINUED | OUTPATIENT
Start: 2020-12-17 | End: 2020-12-28

## 2020-12-17 RX ORDER — EVEROLIMUS 10 MG/1
1.5 TABLET ORAL
Refills: 0 | Status: DISCONTINUED | OUTPATIENT
Start: 2020-12-17 | End: 2020-12-20

## 2020-12-17 RX ORDER — MAGNESIUM OXIDE 400 MG ORAL TABLET 241.3 MG
400 TABLET ORAL DAILY
Refills: 0 | Status: DISCONTINUED | OUTPATIENT
Start: 2020-12-17 | End: 2020-12-30

## 2020-12-17 RX ADMIN — PANTOPRAZOLE SODIUM 40 MILLIGRAM(S): 20 TABLET, DELAYED RELEASE ORAL at 06:01

## 2020-12-17 RX ADMIN — CHLORHEXIDINE GLUCONATE 1 APPLICATION(S): 213 SOLUTION TOPICAL at 18:20

## 2020-12-17 RX ADMIN — Medication 500000 UNIT(S): at 06:01

## 2020-12-17 RX ADMIN — MEROPENEM 100 MILLIGRAM(S): 1 INJECTION INTRAVENOUS at 05:59

## 2020-12-17 RX ADMIN — Medication 5 MILLIGRAM(S): at 18:00

## 2020-12-17 RX ADMIN — Medication 1300 MILLIGRAM(S): at 22:31

## 2020-12-17 RX ADMIN — EVEROLIMUS 2 MILLIGRAM(S): 10 TABLET ORAL at 09:10

## 2020-12-17 RX ADMIN — Medication 81 MILLIGRAM(S): at 12:50

## 2020-12-17 RX ADMIN — Medication 125 MILLIGRAM(S): at 18:00

## 2020-12-17 RX ADMIN — Medication 1300 MILLIGRAM(S): at 12:50

## 2020-12-17 RX ADMIN — Medication 125 MILLIGRAM(S): at 06:01

## 2020-12-17 RX ADMIN — Medication 650 MILLIGRAM(S): at 06:01

## 2020-12-17 RX ADMIN — MEROPENEM 100 MILLIGRAM(S): 1 INJECTION INTRAVENOUS at 18:00

## 2020-12-17 RX ADMIN — EVEROLIMUS 1.5 MILLIGRAM(S): 10 TABLET ORAL at 20:11

## 2020-12-17 RX ADMIN — TAMSULOSIN HYDROCHLORIDE 0.4 MILLIGRAM(S): 0.4 CAPSULE ORAL at 22:31

## 2020-12-17 RX ADMIN — MAGNESIUM OXIDE 400 MG ORAL TABLET 400 MILLIGRAM(S): 241.3 TABLET ORAL at 12:50

## 2020-12-17 RX ADMIN — Medication 500000 UNIT(S): at 12:50

## 2020-12-17 RX ADMIN — Medication 2: at 19:03

## 2020-12-17 RX ADMIN — Medication 1: at 10:19

## 2020-12-17 RX ADMIN — Medication 500000 UNIT(S): at 18:00

## 2020-12-17 RX ADMIN — HEPARIN SODIUM 5000 UNIT(S): 5000 INJECTION INTRAVENOUS; SUBCUTANEOUS at 18:00

## 2020-12-17 RX ADMIN — Medication 1: at 12:49

## 2020-12-17 RX ADMIN — Medication 125 MILLIGRAM(S): at 12:50

## 2020-12-17 RX ADMIN — Medication 5 MILLIGRAM(S): at 06:01

## 2020-12-17 RX ADMIN — INSULIN GLARGINE 10 UNIT(S): 100 INJECTION, SOLUTION SUBCUTANEOUS at 22:31

## 2020-12-17 RX ADMIN — HEPARIN SODIUM 5000 UNIT(S): 5000 INJECTION INTRAVENOUS; SUBCUTANEOUS at 06:01

## 2020-12-17 NOTE — PROGRESS NOTE ADULT - PROBLEM SELECTOR PLAN 1
Recommend floxin drops to the right ear 10 drops BID for one week   Patient to followup as an outpatient with audiologist for new hearing aid

## 2020-12-17 NOTE — PROGRESS NOTE ADULT - ATTENDING COMMENTS
Cdiff, recent osteomyelitis, minimal immunosuppression now with worsening MISA. Enlarged kidney and recent antibiotic use concerning for AIN. ATN is also possible.     Increasing bicarb dosing.  High dose steroids recommended by renal for AIN.  Liver enzymes improving.

## 2020-12-17 NOTE — PROGRESS NOTE ADULT - ASSESSMENT
65 yr old man with liver transplant in July 2020, right plantar osteo s/p debridement on IV cefepime for ~2 weeks.   He is admitted with MISA. Serum creatinine was 1.3-1.5mg/dL on previous admission few weeks ago.   Stool is +ve for C.diff but diarrhea has now subsided. U/A with leukocytes on admission but cultures remain negative.   Right foot osteo s/p graft, looks clean and not infected. Cefepime changed to meropenem.     BUN/Cr continue to rise 72/6.0 today. He is non oliguric. Volume status is fair. He has worsening metabolic acidosis but remainder of electrolytes are fair.   Urine culture negative. US showed enlarged kidneys ~ 15cm, mild fullness but no olga hydro.     Sterile pyuria,  enlarged kidneys, ongoing fever all c/w possible Acute interstitial nephritis possibly from cefepime.   Would defer kidney biopsy at this time in view of multipel comorbidities. Will treat empirically for AIN with prednisone 60mg po daily.   Obtain repeat UA, quantify proteinuria   Obtain C3 and C4  Increase sodium bicarb to 1300mg po TID for metabolic acidosis

## 2020-12-17 NOTE — DIETITIAN INITIAL EVALUATION ADULT. - ADD RECOMMEND
continue Consistent Carbohydrate, do not add DASH at this time since pt is reporting poor intake secondary to current diet restriction. if po intake improves consider adding DASH. recommend Vit C, Nephro-Nj and Gee x 2 daily. continue Consistent Carbohydrate, do not add DASH at this time since pt is reporting poor intake secondary to current diet restriction. if po intake improves consider adding DASH. recommend Vit C, Nephro-Nj -spoke to NP. Gee x 2 daily. pending verification placed

## 2020-12-17 NOTE — PROGRESS NOTE ADULT - SUBJECTIVE AND OBJECTIVE BOX
[facility] DIVISION OF KIDNEY DISEASES AND HYPERTENSION -- FOLLOW UP NOTE  --------------------------------------------------------------------------------  Authored by: Adele Rowland   Cell # [phone]     JESSICA MARTIN was seen and examined at bedside with the transplant surgery and hepatology team.     HPI: 65 yr old man with history of JEAN/Cirrhosis 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 on immunosuppression with Everolimus and CellCept. He is admitted with MISA and new transaminitis.     PMH: DM on insulin, hyperlipidemia, mild diastolic dysfunction, MGUS, chronic anemia, duodenal ulcer.   Post liver transplant he was unable to tolerate CNI due to neurotoxicity/delerium and was swtiched to Everolimus.   He has non healing left heal ulcer that was recently complicated by osteomyelitis s/p debridement by podiatry, he was on IV cefepime x 6 week course of which he completed 2 weeks.       24 hour events: remains hemodynamically stable but continues to have fever upto 101. UOP 2 liters/day. No complaints of pain.   Daptomycin d/karlee, remains on meropenem. Diarrhea has subsided. US done yesterday for asymmetric LE swelling, negative for DVT.     Standing Inpatient Medications  aspirin  chewable 81 milliGRAM(s) Oral daily  everolimus (ZORTRESS) 2 milliGRAM(s) Oral <User Schedule>  heparin   Injectable 5000 Unit(s) SubCutaneous every 12 hours  insulin glargine Injectable (LANTUS) 10 Unit(s) SubCutaneous at bedtime  insulin lispro (ADMELOG) corrective regimen sliding scale   SubCutaneous three times a day before meals  insulin lispro (ADMELOG) corrective regimen sliding scale   SubCutaneous at bedtime  magnesium oxide 800 milliGRAM(s) Oral daily  meropenem  IVPB 500 milliGRAM(s) IV Intermittent every 12 hours  nystatin    Suspension 054906 Unit(s) Oral four times a day  pantoprazole    Tablet 40 milliGRAM(s) Oral before breakfast  predniSONE   Tablet 5 milliGRAM(s) Oral daily  sodium bicarbonate 650 milliGRAM(s) Oral three times a day  tamsulosin 0.4 milliGRAM(s) Oral at bedtime  valGANciclovir 450 milliGRAM(s) Oral <User Schedule>  vancomycin    Solution 125 milliGRAM(s) Oral every 6 hours        VITALS/PHYSICAL EXAM  --------------------------------------------------------------------------------  T(C): 37.5 (12-17-20 @ 09:04), Max: 38.3 (12-17-20 @ 05:00)  HR: 84 (12-17-20 @ 09:04) (84 - 93)  BP: 134/63 (12-17-20 @ 09:04) (134/63 - 157/75)  RR: 18 (12-17-20 @ 09:04) (18 - 18)  SpO2: 97% (12-17-20 @ 09:04) (95% - 97%)    12-16-20 @ 07:01  -  12-17-20 @ 07:00  --------------------------------------------------------  IN: 860 mL / OUT: 2050 mL / NET: -1190 mL    12-17-20 @ 07:01  -  12-17-20 @ 10:44  --------------------------------------------------------  IN: 60 mL / OUT: 200 mL / NET: -140 mL        Physical Exam:  Awake and alert, pale, no signs of distress  moist oral mucosa  no JVD  lungs clear b/l  Systolic murmur at apex   Abdomen obese, soft, non tender  RLE trace edema, LLE no edema, right heel dressing   Alert and oriented      LABS/STUDIES  --------------------------------------------------------------------------------              7.8    7.85  >-----------<  159      [12-17-20 @ 06:16]              23.8     132  |  100  |  72  ----------------------------<  158      [12-17-20 @ 06:16]  4.0   |  15  |  6.06        Ca     8.5     [12-17-20 @ 06:16]      Mg     2.1     [12-17-20 @ 06:16]      Phos  3.4     [12-17-20 @ 06:16]    TPro  5.9  /  Alb  2.6  /  TBili  0.2  /  DBili  x   /  AST  43  /  ALT  67  /  AlkPhos  221  [12-17-20 @ 06:16]    PT/INR: PT 14.1 , INR 1.18       [12-17-20 @ 06:16]  PTT: 28.4       [12-17-20 @ 06:16]      Creatinine Trend:  SCr 6.06 [12-17 @ 06:16]  SCr 5.37 [12-16 @ 15:41]  SCr 4.93 [12-16 @ 05:39]  SCr 3.91 [12-15 @ 06:21]  SCr 3.68 [12-15 @ 03:55]        CAPILLARY BLOOD GLUCOSE  POCT Blood Glucose.: 175 mg/dL (17 Dec 2020 09:54)  POCT Blood Glucose.: 217 mg/dL (16 Dec 2020 21:33)  POCT Blood Glucose.: 201 mg/dL (16 Dec 2020 17:06)  POCT Blood Glucose.: 139 mg/dL (16 Dec 2020 12:56)      Urinalysis - [12-15-20 @ 03:55]      Color Yellow / Appearance Slightly Turbid / SG 1.013 / pH 6.0      Gluc 200 mg/dL / Ketone Negative  / Bili Negative / Urobili Negative       Blood Moderate / Protein 300 mg/dL / Leuk Est Large / Nitrite Negative      RBC 7 /  / Hyaline 0 / Gran  / Sq Epi  / Non Sq Epi 0 / Bacteria Negative    Urine Creatinine 67      [12-15-20 @ 11:39]  Urine Protein 193      [12-15-20 @ 14:51]  Urine Sodium 44      [12-15-20 @ 11:39]  Urine Urea Nitrogen 367      [12-15-20 @ 15:35]  Urine Chloride 43      [12-15-20 @ 11:39]  Urine Osmolality 311      [12-15-20 @ 11:39]    Iron 160, TIBC Unable to calculate Test Repeated, %sat Unable to calculate Test Repeated      [06-12-20 @ 09:04]  Ferritin 493      [04-29-20 @ 08:20]  HbA1c 6.4      [02-12-20 @ 17:08]  TSH 3.26      [04-26-20 @ 09:47]  Lipid: chol 158, , HDL 44, LDL 93      [08-05-20 @ 08:57]

## 2020-12-17 NOTE — DIETITIAN INITIAL EVALUATION ADULT. - PERTINENT LABORATORY DATA
12-17 @ 06:16: Na 132<L>, BUN 72<H>, Cr 6.06<H>, <H>, K+ 4.0, Phos 3.4, Mg 2.1, Alk Phos 221<H>, ALT/SGPT 67<H>, AST/SGOT 43<H>  12-16 @ 15:41: Na 132<L>, BUN 66<H>, Cr 5.37<H>, <H>, K+ 4.2, Alk Phos 210<H>, ALT/SGPT 72<H>, AST/SGOT 54<H>

## 2020-12-17 NOTE — PROGRESS NOTE ADULT - ASSESSMENT
65M with PMHx of IDDM, HLD, obesity, HFpEF with mild LV diastolic dysfunction, MGUS, chronic anemia with a history of duodenal ulcer 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  Underwent OLT on 7/2/2020, post op coure c/b VRE bacteremia tx with linezolid and delirium likely in setting of CNI toxicity (FK discontinued/Everolimus initiated 7/27).    Recent admission from 11/20-12/2 for OM of the R heel, s/p debridement with Podiatry, and was discharged with Cefepime x 6 weeks via PICC line (placed 12/1).   Initial wound cx on admission grew Pseudomonas, OR cx grew staph epi.  He now presents with from clinic rising transaminitis and MISA (SCr 3.3 from 1.5)    [] Febrile, r/o Sepsis  - Recent h/o R heel OM, s/p debridement. Podiatry consulted: no signs of infection  - ID:  remains febrile. Was on Cefepime to cover pseudomonas, now on Meropenem (previous ESBL E coli Prostate abscess and VRE bacteremia post-transplant).  Daptomycin DC per ID yesterday.   - ID recs for today send RVP/COVID   - Continue oral Vancomycin for + CDiff PCR  - TTE w/o obvious evidence of TTE  - Diarrhea: no further diarrhea. CDiff PCR positive.  Continue PO Vanco.   CMV PCR sent 12/16 pending (CMV +/-, valcyte dose reduced to 450mg daily last admission in setting of leukopenia)  - FU PICC tip Cx  - FU BCx 12/14 ngtd, UCx pending    [] MISA   - SCr increasing 6.06 today, good UOP   (SCr 1.5 on discharge)  - Medications renally adjusted  - 5mm distal ureteral calculus, no hydro noted on CT scan yesterday  ( h/o L renal calculus on prior CT (0.7cm)  - Stop Lasix, place bob  - Decrease magnesium to 400mg QD  - Increase HCO3 to 1300mg TID  - Send UA, urine protein, CPK, C3C4  - RLE duplex 2/2 increased edema    [] s/p OLT, now with transaminitis  - Everolimus 2mg bid, MMF on hold since last admission in setting of OM; Pred 5mg daily  - PPx: Valcyte  renally dosed.   Stop Bactrim  - Liver doppler with patent vessels     [] DM  - Controlled  - diabetic diet  - cont Lantus 10u qhs  - cont ISS

## 2020-12-17 NOTE — PROGRESS NOTE ADULT - SUBJECTIVE AND OBJECTIVE BOX
Chief Complaint:  Patient is a 65y old  Male who presents with a chief complaint of transaminitis (16 Dec 2020 13:50)      Interval Events: Diarrhea resolving. Complains of R ear hearing deficit. Denies n/v, chest pain, shortness of breath. Had fever this morning.    Allergies:  codeine (Anaphylaxis)      Hospital Medications:  aspirin  chewable 81 milliGRAM(s) Oral daily  dextrose 40% Gel 15 Gram(s) Oral once  dextrose 5%. 1000 milliLiter(s) IV Continuous <Continuous>  dextrose 5%. 1000 milliLiter(s) IV Continuous <Continuous>  dextrose 50% Injectable 25 Gram(s) IV Push once  dextrose 50% Injectable 12.5 Gram(s) IV Push once  dextrose 50% Injectable 25 Gram(s) IV Push once  everolimus (ZORTRESS) 2 milliGRAM(s) Oral <User Schedule>  glucagon  Injectable 1 milliGRAM(s) IntraMuscular once  heparin   Injectable 5000 Unit(s) SubCutaneous every 12 hours  influenza   Vaccine 0.5 milliLiter(s) IntraMuscular once  insulin glargine Injectable (LANTUS) 10 Unit(s) SubCutaneous at bedtime  insulin lispro (ADMELOG) corrective regimen sliding scale   SubCutaneous three times a day before meals  insulin lispro (ADMELOG) corrective regimen sliding scale   SubCutaneous at bedtime  magnesium oxide 800 milliGRAM(s) Oral daily  meropenem  IVPB 500 milliGRAM(s) IV Intermittent every 12 hours  meropenem  IVPB      nystatin    Suspension 706352 Unit(s) Oral four times a day  pantoprazole    Tablet 40 milliGRAM(s) Oral before breakfast  predniSONE   Tablet 5 milliGRAM(s) Oral daily  sodium bicarbonate 1300 milliGRAM(s) Oral three times a day  tamsulosin 0.4 milliGRAM(s) Oral at bedtime  valGANciclovir 450 milliGRAM(s) Oral <User Schedule>  vancomycin    Solution 125 milliGRAM(s) Oral every 6 hours      PMHX/PSHX:  GIB (gastrointestinal bleeding)    GERD with esophagitis    Hepatic encephalopathy    Obesity    Fatty liver disease, nonalcoholic    Renal stones    Hypertension    Neuropathy    Hypercholesteremia    Diabetes    S/P cholecystectomy    No significant past surgical history        Family history:  Family history of type 2 diabetes mellitus    Family history of hypertension    Family history of stomach cancer    No pertinent family history in first degree relatives        ROS: As per HPI, 14-point ROS negative otherwise.    General:  No wt loss, fevers, chills, night sweats, fatigue,   Eyes:  Good vision, no reported pain  ENT:  No sore throat, pain, runny nose, dysphagia  CV:  No pain, palpitations, hypo/hypertension  Resp:  No dyspnea, cough, tachypnea, wheezing  GI:  See HPI  :  No pain, bleeding, incontinence, nocturia  Muscle:  No pain, weakness  Neuro:  No weakness, tingling, memory problems  Psych:  No fatigue, insomnia, mood problems, depression  Endocrine:  No polyuria, polydipsia, cold/heat intolerance  Heme:  No petechiae, ecchymosis, easy bruisability  Skin:  No rash, edema      PHYSICAL EXAM:     Vital Signs:  Vital Signs Last 24 Hrs  T(C): 37.5 (17 Dec 2020 09:04), Max: 38.3 (17 Dec 2020 05:00)  T(F): 99.5 (17 Dec 2020 09:04), Max: 101 (17 Dec 2020 05:00)  HR: 84 (17 Dec 2020 09:04) (84 - 93)  BP: 134/63 (17 Dec 2020 09:04) (134/63 - 157/75)  BP(mean): --  RR: 18 (17 Dec 2020 09:04) (18 - 18)  SpO2: 97% (17 Dec 2020 09:04) (95% - 97%)  Daily     Daily Weight in k.2 (17 Dec 2020 05:00)    GENERAL:  appears comfortable, no acute distress  HEENT:  NC/AT,  conjunctivae clear, sclera -anicteric  CHEST:  no increased effort  HEART:  Regular rate and rhythm  ABDOMEN:  obese, soft, non-tender, non-distended,  no masses ,no hepato-splenomegaly,   EXTREMITIES:  no cyanosis, clubbing or edema  SKIN:  heel wound wrapped  NEURO:  Alert, oriented    LABS:                        7.8    7.85  )-----------( 159      ( 17 Dec 2020 06:16 )             23.8     12-17    132<L>  |  100  |  72<H>  ----------------------------<  158<H>  4.0   |  15<L>  |  6.06<H>    Ca    8.5      17 Dec 2020 06:16  Phos  3.4     12-  Mg     2.1     12-17    TPro  5.9<L>  /  Alb  2.6<L>  /  TBili  0.2  /  DBili  x   /  AST  43<H>  /  ALT  67<H>  /  AlkPhos  221<H>  12-17    LIVER FUNCTIONS - ( 17 Dec 2020 06:16 )  Alb: 2.6 g/dL / Pro: 5.9 g/dL / ALK PHOS: 221 U/L / ALT: 67 U/L / AST: 43 U/L / GGT: x           PT/INR - ( 17 Dec 2020 06:16 )   PT: 14.1 sec;   INR: 1.18 ratio         PTT - ( 17 Dec 2020 06:16 )  PTT:28.4 sec    Amylase Serum--      Lipase serum--       [id number]      Imaging:

## 2020-12-17 NOTE — DIETITIAN INITIAL EVALUATION ADULT. - OTHER INFO
Intake : pt reports not eating very well  Denies nausea/vomit :  Denies difficulty chewing /swallow :  Denies diarrhea/constipation:  Last BM : 2 days ago  NKFA  IBW +/- 10%= 184 pounds   Ht: 73"  Ht taken from pt  Dosing ht: N/A  Usual Weight PTA: 248 pounds   Dosing wt: N/A  BMI: 32.9  BMI calculated using wt from flow sheet 249.5 pounds   BMI calculated using ht from  pt  wt used to calculate needs: 202.4 #-IBW +10%  Education Provided : Heart Healthy Consistent Carbohydrate Nutrition Therapy   pressure injury: right heel stage 3  edema: none

## 2020-12-17 NOTE — PROGRESS NOTE ADULT - ASSESSMENT
Assessment:  65 year old male with past medical history of OLT (CMV +/-), IDDM, hyperlipidemia, obesity, HFpEF with mild LV diastolic dysfunction, MGUS, chronic anemia with a history of duodenal ulcer as well as GAVE and duodenal AVM s/p APC (last on 10/11/19), decompensated JEAN/cirrhosis who underwent OLT on 7/2/2020, with post-op course complicated by VRE bacteremia that was treated with linezolid and delirium likely in setting of CNI toxicity (FK discontinued/Everolimus initiated 7/27). He was sent in from the clinic after lab work revealed new transaminitis and acute kidney injury. He was also complaining of 24 hours of diarrhea and associated fatigue. Infectious disease was consulted for evaluation of right foot osteomyelitis.    Plan:    # C. diff infection  - Continue oral vancomycin 125 mg PO q6 hours    # ?UTI / Nephrolithiasis / Fever  - Discontinue intravenous daptomycin   - Decrease intravenous meropenem dose to 500 mg IV q12 hours   - If continued rise in Cr would obtain Renal US to exclude developing hydronephrosis at his left ureteral calculus  - Follow up on prelim BCx    # Transaminitis - not improving with defervescence from infection, no hepatobiliary abnormalities on CT A/P  - ?Rejection - workup per transplant surgery team     # History of pseudomonas wound infection and VRE  - Decrease intravenous meropenem dose to 500 mg IV q12 hours   - CT abdomen and pelvis and renal ultrasound was reviewed   - Consider repeat imaging for worsening kidney function  - Monitor fever curve  - Trend CBC daily  - ID will continue to follow    # History of liver transplant  - Continue oral valcyte three times weekly and oral nystatin swish and swallow    Charlie Lafleur MD PGY-4   Fellow, Infectious Diseases   Pager: [phone]  If no response, after 5pm and on weekends: Call [phone]   Assessment:  65 year old male with past medical history of OLT (CMV +/-), IDDM, hyperlipidemia, obesity, HFpEF with mild LV diastolic dysfunction, MGUS, chronic anemia with a history of duodenal ulcer as well as GAVE and duodenal AVM s/p APC (last on 10/11/19), decompensated JEAN/cirrhosis who underwent OLT on 7/2/2020, with post-op course complicated by VRE bacteremia that was treated with linezolid and delirium likely in setting of CNI toxicity (FK discontinued/Everolimus initiated 7/27). He was sent in from the clinic after lab work revealed new transaminitis and acute kidney injury. He was also complaining of 24 hours of diarrhea and associated fatigue. Infectious disease was consulted for evaluation of right foot osteomyelitis.    Plan:    # C. diff infection  - Continue oral vancomycin 125 mg PO q6 hours    # Acute kidney injury  - Hold bactrim  - Pending urine culture    # Weakness, transaminitis  - Repeat RVP and COVID-19 PCR  - Follow PICC line tip culture  - CMV PCR pending     # ?UTI / Nephrolithiasis / Fever  - Discontinue intravenous daptomycin   - Decrease intravenous meropenem dose to 500 mg IV q12 hours   - Follow up on prelim BCx    # History of pseudomonas wound infection and VRE  - Continue intravenous meropenem dose to 500 mg IV q12 hours   - CT abdomen and pelvis showing 5 mm stone   - Consider repeat imaging for worsening kidney function  - Monitor fever curve  - Trend CBC daily  - ID will continue to follow    # History of liver transplant  - Continue oral valcyte three times weekly and oral nystatin swish and swallow    Charlie Lafleur MD PGY-4   Fellow, Infectious Diseases   Pager: [phone]  If no response, after 5pm and on weekends: Call [phone]   Assessment:  65 year old male with past medical history of OLT (CMV +/-), IDDM, hyperlipidemia, obesity, HFpEF with mild LV diastolic dysfunction, MGUS, chronic anemia with a history of duodenal ulcer as well as GAVE and duodenal AVM s/p APC (last on 10/11/19), decompensated JEAN/cirrhosis who underwent OLT on 7/2/2020, with post-op course complicated by VRE bacteremia that was treated with linezolid and delirium likely in setting of CNI toxicity (FK discontinued/Everolimus initiated 7/27). He was sent in from the clinic after lab work revealed new transaminitis and acute kidney injury. He was also complaining of 24 hours of diarrhea and associated fatigue. Infectious disease was consulted for evaluation of right foot osteomyelitis.    Plan:    # Acute kidney injury / Abnormal Urinalysis / Nephrolithiasis   - Recommend Urine Eosinophils   - CT abdomen and pelvis showing 5 mm stone   - Renal US with left collecting system fullness but no hydronephrosis  - Hold bactrim  - UCx with NGTD    # Fever / Transaminitis  - Recommend RVP / COVID-19 PCR  - Daptomycin discontinued  - Continue intravenous meropenem 500 mg IV q12 hours (will need to dose decrease if further rise in Cr)  - Follow up on prelim BCx  - Follow PICC line tip culture  - CMV PCR pending     # C. diff infection  - Continue oral vancomycin 125 mg PO q6 hours    # RLE Heel OM  - Continue intravenous meropenem 500 mg IV q12 hours (will need to dose decrease if further rise in Cr)  - Monitor fever curve  - Trend CBC daily  - ID will continue to follow    # History of liver transplant  - Continue oral valcyte three times weekly and oral nystatin swish and swallow    Charlie Lafleur MD PGY-4   Fellow, Infectious Diseases   Pager: [phone]  If no response, after 5pm and on weekends: Call [phone]

## 2020-12-17 NOTE — PROGRESS NOTE ADULT - ASSESSMENT
64 year old man type 2 diabetes, HLD, GERD, HFpEF with mild LV diastolic dysfunction, and decompensated JEAN cirrhosis, duodenal ulcer, GAVE and duodenal AVM s/p APC (last on 10/11/19) and SHENG who is now s/p liver transplant (7/2/20) with post-op course c/b tacrolimus toxicity and hospital delirium improving after stopping CNI and replacing w/ everolimus. Patient recently presented 11/20/20 with worsening LE swelling and right foot wound with wound culture 11/24 growing S. epidermidis and calcaneus bone biopsy culture 11/24 demonstrating osteomyelitis sent home with cefepime. Patient now with sepsis and MISA likely secondary to cdiff.    Impression:   # Cdiff: on po vanco now. WBC wnl. Diarrhea resolved  # MISA: Cr rising, now 6. Kidney slightly enlarged on u/s- may suggest AIN. nonoliguric. FeNa and FeUrea suggest possible AIN (from cefepime) vs acute tubular necrosis likely from cdiff and volume loss.  # Right foot osteomyelitis:  s/p surgical debridement and graft placement on 11/24 by Podiatry. Wound culture grew pseudomonas and bone bx on 11/24 growing S. epidermidis - likely contaminant. Had PICC line, now removed. On meropenem  # S/p liver transplant 7/2/20: Transaminases and INR mildly elevated likely in setting of infection.   Recipient Info:   ABO: A  CMV:  Neg  EBV Positive  Donor:  Donor ID:  [id number] Match: [id number]  Age: 29 ABO:  A1 High Risk:   No COD: Cardiovascular  Anti CMV positive, EBV IgG- positive  HepBcAb-neg, Hepatitis C-DANA- neg, Hepatitis C ab-neg  # Bladder wall thickening  # Type 2 diabetes  # SHENG     Recommendations:  - continue with po vanco  - continue with meropenem  - increase bicarb dose per renal  - place bob for strict I/Os  - check complement levels per renal  - trial prednisone empircally, 60mg po today  - appreciate transplant ID, renal, surgery reccs  - hold bactrim given MISA  - immunosuppression per transplant surgery; continue with everolimus and prednisone  - hold diuretics  - monitor CBC, CMP, INR  - transplant hepatology to follow

## 2020-12-17 NOTE — PROGRESS NOTE ADULT - ASSESSMENT
· Assessment	  64 yo male patient w/ stable right heel wound with incorporating stravix graft   - Pt seen and evaluated  - Afebrile at encounter, no leukocytosis  - Right heel fibrotic wound measuring 3.0x4.0x0.2cm with incorporating stravix graft with signs of wound contracture and healing; no drainage, no periwound erythema, no cellulitis, no purulence, no signs of acute infection  - During previous admission in November, NM scan & surgical pathology of right calcaneal bone showing osteonecrosis/osteomyelitis. Patient was IV PICC'd for 6 weeks Cefepime 2g IV q8h per ID final recs  - No podiatric acute surgical intervention; right heel wound stable and with signs of healing  - Rec ID consult for other source of infection work up  - Stable from podiatric standpoint and follow up information in discharge paperwork  - Continue with local wound care with [facility]

## 2020-12-17 NOTE — PROGRESS NOTE ADULT - ASSESSMENT
65M with multiple medical problems. ENT consulted for Right Ear Pain which started a few days ago. Patient denies tinnitus and dizziness. Patient states he also has hearing loss in the right ear which has been ongoing for the past 20 years. He uses a hearing aid which his dog recently destroyed. On physical exam, cerumen removed, some ear canal narrowing. TM intact

## 2020-12-17 NOTE — PROGRESS NOTE ADULT - SUBJECTIVE AND OBJECTIVE BOX
Follow Up: Patient is a 65y old  Male who presents with a chief complaint of transaminitis (17 Dec 2020 09:47)      Interval History/ROS:    Allergies    codeine (Anaphylaxis)    Intolerances        ANTIMICROBIALS:  meropenem  IVPB 500 every 12 hours  meropenem  IVPB    nystatin    Suspension [id number] four times a day  valGANciclovir 450 <User Schedule>  vancomycin    Solution 125 every 6 hours      OTHER MEDS:  aspirin  chewable 81 milliGRAM(s) Oral daily  dextrose 40% Gel 15 Gram(s) Oral once  dextrose 5%. 1000 milliLiter(s) IV Continuous <Continuous>  dextrose 5%. 1000 milliLiter(s) IV Continuous <Continuous>  dextrose 50% Injectable 25 Gram(s) IV Push once  dextrose 50% Injectable 12.5 Gram(s) IV Push once  dextrose 50% Injectable 25 Gram(s) IV Push once  everolimus (ZORTRESS) 2 milliGRAM(s) Oral <User Schedule>  glucagon  Injectable 1 milliGRAM(s) IntraMuscular once  heparin   Injectable 5000 Unit(s) SubCutaneous every 12 hours  influenza   Vaccine 0.5 milliLiter(s) IntraMuscular once  insulin glargine Injectable (LANTUS) 10 Unit(s) SubCutaneous at bedtime  insulin lispro (ADMELOG) corrective regimen sliding scale   SubCutaneous three times a day before meals  insulin lispro (ADMELOG) corrective regimen sliding scale   SubCutaneous at bedtime  magnesium oxide 800 milliGRAM(s) Oral daily  pantoprazole    Tablet 40 milliGRAM(s) Oral before breakfast  predniSONE   Tablet 5 milliGRAM(s) Oral daily  sodium bicarbonate 1300 milliGRAM(s) Oral three times a day  tamsulosin 0.4 milliGRAM(s) Oral at bedtime      Vital Signs Last 24 Hrs  T(C): 37.5 (17 Dec 2020 09:04), Max: 38.3 (17 Dec 2020 05:00)  T(F): 99.5 (17 Dec 2020 09:04), Max: 101 (17 Dec 2020 05:00)  HR: 84 (17 Dec 2020 09:04) (84 - 93)  BP: 134/63 (17 Dec 2020 09:04) (134/63 - 157/75)  BP(mean): --  RR: 18 (17 Dec 2020 09:04) (18 - 18)  SpO2: 97% (17 Dec 2020 09:04) (95% - 97%)    REVIEW OF SYSTEMS  [  ] ROS unobtainable because:   [x] All other systems negative except as noted below:	    Constitutional:  [ ] fever [ ] chills  [ ] weight loss  [ ] weakness  Skin:  [ ] rash [ ] phlebitis	  Eyes: [ ] icterus [ ] pain  [ ] discharge	  ENMT: [ ] sore throat  [ ] thrush [ ] ulcers [ ] exudates  Respiratory: [ ] dyspnea [ ] hemoptysis [ ] cough [ ] sputum	  Cardiovascular:  [ ] chest pain [ ] palpitations [ ] edema	  Gastrointestinal:  [ ] nausea [ ] vomiting [ ] diarrhea [ ] constipation [ ] pain	  Genitourinary:  [ ] dysuria [ ] frequency [ ] hematuria [ ] discharge [ ] flank pain  [ ] incontinence  Musculoskeletal:  [ ] myalgias [ ] arthralgias [ ] arthritis  [ ] back pain  Neurological:  [ ] headache [ ] seizures  [ ] confusion/altered mental status  Psychiatric:  [ ] anxiety [ ] depression	  Endocrine:  [ ] adrenal [ ] thyroid  Allergic/Immunologic:	 [x] transplant [ ] seasonal    PHYSICAL EXAM:  Constitutional: non-toxic, no distress  HEAD/EYES: anicteric, no conjunctival injection  ENT:  supple, no thrush  Cardiovascular:   normal S1, S2, no murmur, no edema  Respiratory:  clear BS bilaterally, no wheezes, no rales  GI:  soft, non-tender, normal bowel sounds  :  no bob, no CVA tenderness  Musculoskeletal:  no synovitis, normal ROM  Neurologic: awake and alert, normal strength, no focal findings  Skin:  no rash, no erythema, no phlebitis, + right lower extremity fibrotic wound without purulence, edema, or erythema   Heme/Onc: no lymphadenopathy   Psychiatric:  awake, alert, appropriate mood                          7.8    7.85  )-----------( 159      ( 17 Dec 2020 06:16 )             23.8       12-17    132<L>  |  100  |  72<H>  ----------------------------<  158<H>  4.0   |  15<L>  |  6.06<H>    Ca    8.5      17 Dec 2020 06:16  Phos  3.4     12-17  Mg     2.1     12-17    TPro  5.9<L>  /  Alb  2.6<L>  /  TBili  0.2  /  DBili  x   /  AST  43<H>  /  ALT  67<H>  /  AlkPhos  221<H>  12-17          MICROBIOLOGY:    Culture Results:   No growth (12-15 @ 16:43)  Culture Results:   <10,000 CFU/mL Normal Urogenital Shantel (12-15 @ 16:28)  Culture Results:   No growth to date. (12-14 @ 23:13)  Culture Results:   No growth to date. (12-14 @ 23:13)  Culture Results:   No growth to date. (12-14 @ 23:13)        RADIOLOGY:    <The imaging below has been reviewed and visualized by me independently. Findings as detailed in report below>    EXAM:  CT ABDOMEN AND PELVIS                        PROCEDURE DATE:  12/15/2020    A 5 mm distal left ureteral calculus. No associated hydronephrosis.  Diffuse wall thickening, slightly asymmetric on the left may be related to urinary tract infection. Consider follow-up cystoscopy.  No evidence of a prostatic abscess as questioned.     Follow Up: Patient is a 65y old  Male who presents with a chief complaint of transaminitis (17 Dec 2020 09:47)      Interval History/ROS: febrile again overnight. denies diarrhea or abdominal pain. denies cough or SOB. States he feels better    Allergies    codeine (Anaphylaxis)    Intolerances        ANTIMICROBIALS:  meropenem  IVPB 500 every 12 hours  meropenem  IVPB    nystatin    Suspension [id number] four times a day  valGANciclovir 450 <User Schedule>  vancomycin    Solution 125 every 6 hours      OTHER MEDS:  aspirin  chewable 81 milliGRAM(s) Oral daily  dextrose 40% Gel 15 Gram(s) Oral once  dextrose 5%. 1000 milliLiter(s) IV Continuous <Continuous>  dextrose 5%. 1000 milliLiter(s) IV Continuous <Continuous>  dextrose 50% Injectable 25 Gram(s) IV Push once  dextrose 50% Injectable 12.5 Gram(s) IV Push once  dextrose 50% Injectable 25 Gram(s) IV Push once  everolimus (ZORTRESS) 2 milliGRAM(s) Oral <User Schedule>  glucagon  Injectable 1 milliGRAM(s) IntraMuscular once  heparin   Injectable 5000 Unit(s) SubCutaneous every 12 hours  influenza   Vaccine 0.5 milliLiter(s) IntraMuscular once  insulin glargine Injectable (LANTUS) 10 Unit(s) SubCutaneous at bedtime  insulin lispro (ADMELOG) corrective regimen sliding scale   SubCutaneous three times a day before meals  insulin lispro (ADMELOG) corrective regimen sliding scale   SubCutaneous at bedtime  magnesium oxide 800 milliGRAM(s) Oral daily  pantoprazole    Tablet 40 milliGRAM(s) Oral before breakfast  predniSONE   Tablet 5 milliGRAM(s) Oral daily  sodium bicarbonate 1300 milliGRAM(s) Oral three times a day  tamsulosin 0.4 milliGRAM(s) Oral at bedtime      Vital Signs Last 24 Hrs  T(C): 37.5 (17 Dec 2020 09:04), Max: 38.3 (17 Dec 2020 05:00)  T(F): 99.5 (17 Dec 2020 09:04), Max: 101 (17 Dec 2020 05:00)  HR: 84 (17 Dec 2020 09:04) (84 - 93)  BP: 134/63 (17 Dec 2020 09:04) (134/63 - 157/75)  BP(mean): --  RR: 18 (17 Dec 2020 09:04) (18 - 18)  SpO2: 97% (17 Dec 2020 09:04) (95% - 97%)    REVIEW OF SYSTEMS  [  ] ROS unobtainable because:   [x] All other systems negative except as noted below:	    Constitutional:  [ ] fever [ ] chills  [ ] weight loss  [ ] weakness  Skin:  [ ] rash [ ] phlebitis	  Eyes: [ ] icterus [ ] pain  [ ] discharge	  ENMT: [ ] sore throat  [ ] thrush [ ] ulcers [ ] exudates  Respiratory: [ ] dyspnea [ ] hemoptysis [ ] cough [ ] sputum	  Cardiovascular:  [ ] chest pain [ ] palpitations [ ] edema	  Gastrointestinal:  [ ] nausea [ ] vomiting [ ] diarrhea [ ] constipation [ ] pain	  Genitourinary:  [ ] dysuria [ ] frequency [ ] hematuria [ ] discharge [ ] flank pain  [ ] incontinence  Musculoskeletal:  [ ] myalgias [ ] arthralgias [ ] arthritis  [ ] back pain  Neurological:  [ ] headache [ ] seizures  [ ] confusion/altered mental status  Psychiatric:  [ ] anxiety [ ] depression	  Endocrine:  [ ] adrenal [ ] thyroid  Allergic/Immunologic:	 [x] transplant [ ] seasonal    PHYSICAL EXAM:  Constitutional: non-toxic, no distress  HEAD/EYES: anicteric, no conjunctival injection  ENT:  supple, no thrush  Cardiovascular:   normal S1, S2, no murmur, no edema  Respiratory:  clear BS bilaterally, no wheezes, no rales  GI:  soft, non-tender, normal bowel sounds  :  no bob, no CVA tenderness  Musculoskeletal:  no synovitis, normal ROM  Neurologic: awake and alert, normal strength, no focal findings  Skin:  no rash, no erythema, no phlebitis, +RLE with dressing over the right foot , edema, or erythema   Heme/Onc: no lymphadenopathy   Psychiatric:  awake, alert, appropriate mood                          7.8    7.85  )-----------( 159      ( 17 Dec 2020 06:16 )             23.8       12-17    132<L>  |  100  |  72<H>  ----------------------------<  158<H>  4.0   |  15<L>  |  6.06<H>    Ca    8.5      17 Dec 2020 06:16  Phos  3.4     12-17  Mg     2.1     12-17    TPro  5.9<L>  /  Alb  2.6<L>  /  TBili  0.2  /  DBili  x   /  AST  43<H>  /  ALT  67<H>  /  AlkPhos  221<H>  12-17          MICROBIOLOGY:    Culture Results:   No growth (12-15 @ 16:43)  Culture Results:   <10,000 CFU/mL Normal Urogenital Shantel (12-15 @ 16:28)  Culture Results:   No growth to date. (12-14 @ 23:13)  Culture Results:   No growth to date. (12-14 @ 23:13)  Culture Results:   No growth to date. (12-14 @ 23:13)        RADIOLOGY:    <The imaging below has been reviewed and visualized by me independently. Findings as detailed in report below>    EXAM:  US KIDNEYS AND BLADDER                        PROCEDURE DATE:  12/16/2020    Fullness of the left collecting system.  Left upper quadrant ascites.      EXAM:  CT ABDOMEN AND PELVIS                        PROCEDURE DATE:  12/15/2020    A 5 mm distal left ureteral calculus. No associated hydronephrosis.  Diffuse wall thickening, slightly asymmetric on the left may be related to urinary tract infection. Consider follow-up cystoscopy.  No evidence of a prostatic abscess as questioned.

## 2020-12-17 NOTE — PROGRESS NOTE ADULT - ATTENDING COMMENTS
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, CNI Neurotoxicity), IDDM, hyperlipidemia, obesity, HFpEF with mild LV diastolic dysfunction, MGUS who presented with fevers, diarrhea and abnormal labwork    Of note, recent admission for R Heel Osteomyelitis   s/p right foot heel incision and drainage w/ application of stravix and bone biopsy on 11/24  Superficial cultures with Pseudomonas and Operative Cultures with Coag Neg Staph in fluid media  Discharged on IV Cefepime with plan for 6 week course    On 12/14 labwork was noted to have MISA and Transaminitis  Of note [facility] outpatient labs from 12/9 with Cr of 1.41 and LFT's WNL    U/A here with pyuria, UCx with NGTD  COVID19 PCR Negative  C diff toxin assay indeterminate with Positive C diff PCR  CXR Clear   CT A/P with distal left ureteral calculus without hydronephrosis and Diffuse wall thickening, slightly asymmetric on the left but without prostate abscess.   Renal US with left collecting system fullness but no hydronephrosis    The patient's heel appears without cellulitis or drainage so I doubt that this is the source of his fever.   Given the negative UCx believe we can monitor off of Daptomycin (No VRE growth)  Would continue Meropenem at this point (given prior ESBL E coli recurrent UTI)  Would continue PO Vancomycin    Question of AIN raised - would check Urine Eosinophils  Would also check RVP/COVID PCR given persistent fever / transaminitis     Overall, C diff infection, Fever, Transaminitis, MISA, Abnormal Urinalysis, Nephrolithiasis, Heel Osteomyelitis, Liver Transplant Recipient    I will continue to follow. Please feel free to contact me with any further questions.    Eusebio Pérez M.D.  [facility] Division of Infectious Disease  8AM-5PM: Pager Number [phone]  After Hours (or if no response): Please contact the Infectious Diseases Office at [phone]     The above assessment and plan were discussed with transplant surgery team

## 2020-12-17 NOTE — DIETITIAN INITIAL EVALUATION ADULT. - PERTINENT MEDS FT
MEDICATIONS  (STANDING):  aspirin  chewable 81 milliGRAM(s) Oral daily  dextrose 40% Gel 15 Gram(s) Oral once  dextrose 5%. 1000 milliLiter(s) (50 mL/Hr) IV Continuous <Continuous>  dextrose 5%. 1000 milliLiter(s) (100 mL/Hr) IV Continuous <Continuous>  dextrose 50% Injectable 25 Gram(s) IV Push once  dextrose 50% Injectable 12.5 Gram(s) IV Push once  dextrose 50% Injectable 25 Gram(s) IV Push once  everolimus (ZORTRESS) 2 milliGRAM(s) Oral <User Schedule>  glucagon  Injectable 1 milliGRAM(s) IntraMuscular once  heparin   Injectable 5000 Unit(s) SubCutaneous every 12 hours  influenza   Vaccine 0.5 milliLiter(s) IntraMuscular once  insulin glargine Injectable (LANTUS) 10 Unit(s) SubCutaneous at bedtime  insulin lispro (ADMELOG) corrective regimen sliding scale   SubCutaneous three times a day before meals  insulin lispro (ADMELOG) corrective regimen sliding scale   SubCutaneous at bedtime  magnesium oxide 400 milliGRAM(s) Oral daily  meropenem  IVPB 500 milliGRAM(s) IV Intermittent every 12 hours  meropenem  IVPB      nystatin    Suspension [id number] Unit(s) Oral four times a day  pantoprazole    Tablet 40 milliGRAM(s) Oral before breakfast  predniSONE   Tablet 5 milliGRAM(s) Oral daily  sodium bicarbonate 1300 milliGRAM(s) Oral three times a day  tamsulosin 0.4 milliGRAM(s) Oral at bedtime  valGANciclovir 450 milliGRAM(s) Oral <User Schedule>  vancomycin    Solution 125 milliGRAM(s) Oral every 6 hours    MEDICATIONS  (PRN):

## 2020-12-17 NOTE — PROGRESS NOTE ADULT - SUBJECTIVE AND OBJECTIVE BOX
Transplant Surgery - Multidisciplinary Rounds  --------------------------------------------------------------  s/p OLT 7/2/2020      Admitted 12/14/2020 with elevated LFTs, MISA, diarrhea    Present:  Patient seen with multidisciplinary team including Transplant Surgeon: Dr. Lucio, Transplant hepatology Dr. Lee, Transplant Nephrologist: Dr. Rowland,  Pharmacist: Jessica Nagel, [facility]'s Oswald Nguyen, and Surgical Resident Rufino during am rounds and examined with Dr. Lucio . Disciplines not in attendance will be notified of the plan.      HPI: 65M with PMHx of IDDM, HLD, obesity, HFpEF with mild LV diastolic dysfunction, MGUS, chronic anemia with a history of duodenal ulcer 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  Underwent OLT on 7/2/2020, post op course c/b VRE bacteremia tx with linezolid and delirium likely in setting of CNI toxicity (FK discontinued/Everolimus initiated 7/27).    Recent admission from 11/20-12/2 for OM of the R heel, s/p debridement with Podiatry, and was discharged with Cefepime x 6 weeks via PICC line (placed 12/1). Initial wound cx on admission grew Pseudomonas, OR cx grew staph epi. Was also found with neutropenia and Valcyte was reduced (was on 900mg daily CMV +/-).      Sent to ED from clinic with rising transaminitis and MISA (SCr 3.3 from 1.5)    Interval Events:  - Tmax 38.3  WBC 7.85   - On Meropenem.  Oral Vanco for C-diff  - CDiff PCR positive   - 12/14 BCx  ngtd   UCX pending    - LFTs trending down ASt/ALt/Alk phos 43/67/221  - Cr uptrending to 6.06 from 5.37, received one dose lasix 40 yesterday for weight gain with UO 2 L  - CT scan completed showing 5mm distal ureteral calculus, no hydro, no prostate abscess, diffuse bladder wall thickening  - TTE: EF 61%.  No obvious endocarditis  - Liver US: patent portal veins, no tardus parvus, elevated velocities in main hepatic artery    ----> Transaminitis  AST 18-> 77-> 61 -> 60 ->43  ALT 26 -> 88 -> 81-> 80 ->67  Alk Phos 126 -> 169 -> 167 -> 196->221  INR 0.97 -> 1.26 -> 1.31 -> 1.33->1.18      Potential Discharge date: pending clinical improvement     Education:  Medications    Plan of care:  See Below         MEDICATIONS  (STANDING):  aspirin  chewable 81 milliGRAM(s) Oral daily  dextrose 40% Gel 15 Gram(s) Oral once  dextrose 5%. 1000 milliLiter(s) (50 mL/Hr) IV Continuous <Continuous>  dextrose 5%. 1000 milliLiter(s) (100 mL/Hr) IV Continuous <Continuous>  dextrose 50% Injectable 25 Gram(s) IV Push once  dextrose 50% Injectable 12.5 Gram(s) IV Push once  dextrose 50% Injectable 25 Gram(s) IV Push once  everolimus (ZORTRESS) 2 milliGRAM(s) Oral <User Schedule>  glucagon  Injectable 1 milliGRAM(s) IntraMuscular once  heparin   Injectable 5000 Unit(s) SubCutaneous every 12 hours  influenza   Vaccine 0.5 milliLiter(s) IntraMuscular once  insulin glargine Injectable (LANTUS) 10 Unit(s) SubCutaneous at bedtime  insulin lispro (ADMELOG) corrective regimen sliding scale   SubCutaneous three times a day before meals  insulin lispro (ADMELOG) corrective regimen sliding scale   SubCutaneous at bedtime  magnesium oxide 400 milliGRAM(s) Oral daily  meropenem  IVPB 500 milliGRAM(s) IV Intermittent every 12 hours  meropenem  IVPB      nystatin    Suspension 355724 Unit(s) Oral four times a day  pantoprazole    Tablet 40 milliGRAM(s) Oral before breakfast  predniSONE   Tablet 5 milliGRAM(s) Oral daily  sodium bicarbonate 1300 milliGRAM(s) Oral three times a day  tamsulosin 0.4 milliGRAM(s) Oral at bedtime  valGANciclovir 450 milliGRAM(s) Oral <User Schedule>  vancomycin    Solution 125 milliGRAM(s) Oral every 6 hours    MEDICATIONS  (PRN):      PAST MEDICAL & SURGICAL HISTORY:  GIB (gastrointestinal bleeding)    GERD with esophagitis  Gastritis &amp; Non Bleeding Ulcers    Hepatic encephalopathy    Obesity    Fatty liver disease, nonalcoholic    Renal stones  25 years ago    Hypertension    Neuropathy    Hypercholesteremia    Diabetes    S/P cholecystectomy        Vital Signs Last 24 Hrs  T(C): 37.5 (17 Dec 2020 09:04), Max: 38.3 (17 Dec 2020 05:00)  T(F): 99.5 (17 Dec 2020 09:04), Max: 101 (17 Dec 2020 05:00)  HR: 84 (17 Dec 2020 09:04) (84 - 93)  BP: 134/63 (17 Dec 2020 09:04) (134/63 - 157/75)  BP(mean): --  RR: 18 (17 Dec 2020 09:04) (18 - 18)  SpO2: 97% (17 Dec 2020 09:04) (95% - 97%)    I&O's Summary    16 Dec 2020 07:01  -  17 Dec 2020 07:00  --------------------------------------------------------  IN: 860 mL / OUT: 2050 mL / NET: -1190 mL    17 Dec 2020 07:01  -  17 Dec 2020 11:36  --------------------------------------------------------  IN: 60 mL / OUT: 200 mL / NET: -140 mL                              7.8    7.85  )-----------( 159      ( 17 Dec 2020 06:16 )             23.8     12-17    132<L>  |  100  |  72<H>  ----------------------------<  158<H>  4.0   |  15<L>  |  6.06<H>    Ca    8.5      17 Dec 2020 06:16  Phos  3.4     12-17  Mg     2.1     12-17    TPro  5.9<L>  /  Alb  2.6<L>  /  TBili  0.2  /  DBili  x   /  AST  43<H>  /  ALT  67<H>  /  AlkPhos  221<H>  12-17          Culture - Catheter (collected 12-15-20 @ 16:43)  Source: .Catheter PICC line tip  Preliminary Report (12-16-20 @ 19:22):    No growth    Culture - Urine (collected 12-15-20 @ 16:28)  Source: .Urine Clean Catch (Midstream)  Final Report (12-16-20 @ 12:12):    <10,000 CFU/mL Normal Urogenital Shantel    Culture - Blood (collected 12-14-20 @ 23:13)  Source: .Blood Blood-Peripheral  Preliminary Report (12-16-20 @ 01:02):    No growth to date.    Culture - Blood (collected 12-14-20 @ 23:13)  Source: .Blood Blood-Peripheral  Preliminary Report (12-16-20 @ 01:02):    No growth to date.    Culture - Blood (collected 12-14-20 @ 23:13)  Source: .Blood PICC/PERC Single Lumen  Preliminary Report (12-16-20 @ 01:02):    No growth to date.      EXAM:  CT ABDOMEN AND PELVIS    PROCEDURE DATE:  12/15/2020    INTERPRETATION:  CLINICAL INFORMATION: Post liver transplant with rising LFTs, febrile and positive urinalysis. Evaluate for prostatic abscess.  COMPARISON: Chest abdomen pelvis 7/11/2020  PROCEDURE:  CT of the Abdomen and Pelvis was performed without intravenous contrast.  Intravenous contrast: None.  Oral contrast: None.  Sagittal and coronal reformats were performed.    FINDINGS:  LOWER CHEST: Coronary calcification.    LIVER: Hepatic transplant.  BILE DUCTS: Normal caliber.  GALLBLADDER: Cholecystectomy.  SPLEEN: Within normal limits.  PANCREAS: Within normal limits.  ADRENALS: Within normal limits.  KIDNEYS/URETERS: A 5 mm distal left ureteral calculus without associated hydronephrosis.    BLADDER: Diffuse wall thickening, slightly asymmetric on the left may be related to urinary tract infection. Consider follow-up cystoscopy.  REPRODUCTIVE ORGANS: Prostate is normal in size. No evidence of a prostatic abscess as questioned.    BOWEL: No bowel obstruction.  PERITONEUM: Small volume ascites.  VESSELS: Atherosclerotic changes.  RETROPERITONEUM/LYMPH NODES: No lymphadenopathy.  ABDOMINAL WALL: Within normal limits.  BONES: Degenerative changes.    IMPRESSION:  A 5 mm distal left ureteral calculus. No associated hydronephrosis.    Diffuse wall thickening, slightly asymmetric on the left may be related to urinary tract infection. Consider follow-up cystoscopy.    No evidence of a prostatic abscess as questioned.    Findings were discussed with Dr. Lucio 12/15/2020 2:27 PM by Dr. Parks with read back confirmation.        EXAM:  US DPLX ABDOMEN    PROCEDURE DATE:  12/15/2020    INTERPRETATION:  CLINICAL INFORMATION: Status post liver transplant 7/2/2020. Elevated LFTs. Evaluate for portal vein thrombosis.  COMPARISON: CT abdomen/pelvis 12/15/2020.  TECHNIQUE: Color and spectral Doppler evaluation of the hepatic vasculature.  FINDINGS:  Homogeneous echogenicity of the liver transplant. No intrahepatic biliary ductal dilatation. Trace perihepatic fluid.    Main Portal Vein: Hepatopetal flow, 63 cm/s. 1.0 cm maximum diameter.  Left Portal Vein: Patent with hepatopetal flow.  Anterior Right Portal Vein: Patent with hepatopetal flow.  Posterior Right Portal Vein: Patent with hepatopetal flow.    Main Hepatic Artery: Normal direction of flow, peak systolic velocity 205-324 cm/s. RI 0.71.  Right anterior hepatic artery: Peak systolic velocity 63 cm/s. RI 0.50  Right posterior hepatic artery: Peak systolic velocity 173 cm/s. RI 0.60  Left hepatic artery: Peak systolic velocity 70 cm/s. RI 0.56.  No tardus parvus waveforms.    Hepatic Veins and Inferior Vena Cava: Patent with normal direction of flow.    Trace ascites.    IMPRESSION: Status post liver transplant. Elevated velocities in the main hepatic artery, increased from the prior ultrasound examination. No distal tardus parvus waveforms. Close interval follow-up is recommended. Patent portal veins.    PROCEDURE: Transthoracic echocardiogram with 2-D, M-Mode  and complete spectral and color flow Doppler.  INDICATION: Cardiac murmur, unspecified (R01.1)  ------------------------------------------------------------------------  Dimensions:    Normal Values:  LA:     4.7    2.0 - 4.0 cm  Ao:     3.1    2.0 - 3.8 cm  SEPTUM: 0.7    0.6 - 1.2 cm  PWT:    0.8    0.6 - 1.1 cm  LVIDd:  5.5    3.0 - 5.6 cm  LVIDs:  3.7    1.8 - 4.0 cm  Derived variables:  LVMI: 63 g/m2  RWT: 0.29  Fractional short: 33 %  EF (Visual Estimate): 60-65 %  EF (Teicholtz): 61 %  Doppler Peak Velocity (m/sec): AoV=2.4  ------------------------------------------------------------------------  Conclusions:  1. Mitral annular calcification and calcified mitral  leaflets with normal diastolic opening.  2. Calcified trileaflet aortic valve with decreased  opening. Peak transaortic valve gradient equals 23 mm Hg,  mean transaortic valve gradient equals 13 mm Hg, estimated  aortic valve area equals 1.6 sqcm (by continuity equation),  aortic valve velocity time integral equals 47 cm,  consistent with mild aortic stenosis.  3. Normal left ventricular internal dimensions and wall  thicknesses.  4. Normal left ventricular systolic function. No segmental  wall motion abnormalities.  5. Estimated pulmonary artery systolic pressure equals 37  mm Hg, assuming right atrial pressure equals 6 - 10 mm Hg,  consistent with borderline pulmonary pressures.  6. No obvious evidence of endocarditis upon review of  suboptimal images. Consider additional imaging/modalities  if clinically indicated weth a high suspcion.  *** Compared with echocardiogram of 7/15/2020, no  significant changes.  ---------------------------------------      Review of systems  Gen: tired  Skin:  R heel wound   Head/Eyes/Ears/Mouth: No headache; Normal hearing; Normal vision w/o blurriness; No sinus pain/discomfort, sore throat  Respiratory: No dyspnea, cough, wheezing, hemoptysis  CV: No chest pain, PND, orthopnea  GI:  denies abdominal pain, diarrhea, constipation, nausea, vomiting, melena, hematochezia  : No increased frequency, dysuria, hematuria, nocturia  MSK: No joint pain/swelling; no back pain; no edema  Neuro: No dizziness/lightheadedness, weakness, seizures, numbness, tingling  Heme: No easy bruising or bleeding  Endo: No heat/cold intolerance  Psych: No significant nervousness, anxiety, stress, depression  All other systems were reviewed and are negative, except as noted.      PHYSICAL EXAM:  Constitutional: Well developed / well nourished  Eyes: Anicteric, PERRLA  ENMT: nc/at  Respiratory: CTA B/L  Cardiovascular: RRR, + heart murmur  Gastrointestinal: Soft, non distended, non tender, incision well healed  Genitourinary: Voiding spontaneously  Extremities: SCD's in place and working bilaterally  Vascular: Palpable dp pulses bilaterally  Neurological: A&O x3  Skin: R heel ulcer: no drainage, no cellulitis   Musculoskeletal: Moving all extremities  Psychiatric: Responsive

## 2020-12-17 NOTE — DIETITIAN INITIAL EVALUATION ADULT. - ORAL INTAKE PTA/DIET HISTORY
ray, eggs, Salv[place]orean toast, syrup, salt, pepper for breakfast, he could not commit to lunch and dinner. he snacks on belvita crackers.

## 2020-12-17 NOTE — PROGRESS NOTE ADULT - ATTENDING COMMENTS
remains with low grade fever  source unclear; UTI? , c diff  liver functions now trending down  creatinine still rising; ATN? sepsis? interstitial nephritis?  now on meropenam,   immunosuppression everolimus and steroids

## 2020-12-17 NOTE — PROGRESS NOTE ADULT - SUBJECTIVE AND OBJECTIVE BOX
Podiatry pager #: [phone]/ [phone]    Patient is a 65y old  Male who presents with a chief complaint of transaminitis (17 Dec 2020 11:36) Podiatry seen today for f/u heel wound       INTERVAL HPI/OVERNIGHT EVENTS:  Patient seen and evaluated at bedside.  Pt is resting comfortable in NAD. Denies N/V/F/C.  Pain rated at X/10    Allergies    codeine (Anaphylaxis)    Intolerances        Vital Signs Last 24 Hrs  T(C): 37.1 (17 Dec 2020 17:00), Max: 38.3 (17 Dec 2020 05:00)  T(F): 98.7 (17 Dec 2020 17:00), Max: 101 (17 Dec 2020 05:00)  HR: 84 (17 Dec 2020 17:00) (78 - 90)  BP: 132/66 (17 Dec 2020 17:00) (125/57 - 151/66)  BP(mean): --  RR: 18 (17 Dec 2020 17:00) (18 - 18)  SpO2: 94% (17 Dec 2020 17:00) (94% - 97%)    aspirin  chewable 81 milliGRAM(s) Oral daily  chlorhexidine 2% Cloths 1 Application(s) Topical daily  dextrose 40% Gel 15 Gram(s) Oral once  dextrose 5%. 1000 milliLiter(s) IV Continuous <Continuous>  dextrose 5%. 1000 milliLiter(s) IV Continuous <Continuous>  dextrose 50% Injectable 25 Gram(s) IV Push once  dextrose 50% Injectable 12.5 Gram(s) IV Push once  dextrose 50% Injectable 25 Gram(s) IV Push once  everolimus (ZORTRESS) 1.5 milliGRAM(s) Oral <User Schedule>  glucagon  Injectable 1 milliGRAM(s) IntraMuscular once  heparin   Injectable 5000 Unit(s) SubCutaneous every 12 hours  influenza   Vaccine 0.5 milliLiter(s) IntraMuscular once  insulin glargine Injectable (LANTUS) 10 Unit(s) SubCutaneous at bedtime  insulin lispro (ADMELOG) corrective regimen sliding scale   SubCutaneous three times a day before meals  insulin lispro (ADMELOG) corrective regimen sliding scale   SubCutaneous at bedtime  magnesium oxide 400 milliGRAM(s) Oral daily  meropenem  IVPB 500 milliGRAM(s) IV Intermittent every 12 hours  meropenem  IVPB      nystatin    Suspension 368441 Unit(s) Oral four times a day  pantoprazole    Tablet 40 milliGRAM(s) Oral before breakfast  predniSONE   Tablet 5 milliGRAM(s) Oral two times a day  sodium bicarbonate 1300 milliGRAM(s) Oral three times a day  tamsulosin 0.4 milliGRAM(s) Oral at bedtime  valGANciclovir 450 milliGRAM(s) Oral <User Schedule>  vancomycin    Solution 125 milliGRAM(s) Oral every 6 hours      LABS:                        7.8    7.85  )-----------( 159      ( 17 Dec 2020 06:16 )             23.8     12    132<L>  |  100  |  72<H>  ----------------------------<  158<H>  4.0   |  15<L>  |  6.06<H>    Ca    8.5      17 Dec 2020 06:16  Phos  3.4       Mg     2.1         TPro  5.9<L>  /  Alb  2.6<L>  /  TBili  0.2  /  DBili  x   /  AST  43<H>  /  ALT  67<H>  /  AlkPhos  221<H>      PT/INR - ( 17 Dec 2020 06:16 )   PT: 14.1 sec;   INR: 1.18 ratio         PTT - ( 17 Dec 2020 06:16 )  PTT:28.4 sec  Urinalysis Basic - ( 17 Dec 2020 16:54 )    Color: Light Yellow / Appearance: Slightly Turbid / S.012 / pH: x  Gluc: x / Ketone: Negative  / Bili: Negative / Urobili: Negative   Blood: x / Protein: 100 mg/dL / Nitrite: Negative   Leuk Esterase: Large / RBC: 3 /hpf /  /HPF   Sq Epi: x / Non Sq Epi: 0 /hpf / Bacteria: Negative      CAPILLARY BLOOD GLUCOSE      POCT Blood Glucose.: 236 mg/dL (17 Dec 2020 18:51)  POCT Blood Glucose.: 164 mg/dL (17 Dec 2020 12:34)  POCT Blood Glucose.: 175 mg/dL (17 Dec 2020 09:54)  POCT Blood Glucose.: 217 mg/dL (16 Dec 2020 21:33)      Lower Extremity Physical Exam:  Vascular: DP/PT 1/4 B/L, CFT <3 seconds B/L, Temperature gradient WNL B/L.   Neuro: Epicritic sensation diminished to the level of heel B/L.  Musculoskeletal/Ortho: unremarkable, no b/l LE edema  Skin:  Right heel fibrotic wound measuring 3.0x4.0x0.2cm with incorporating stravix graft with signs of wound contracture and healing; no drainage, no periwound erythema, no cellulitis, no purulence, no signs of acute infection    RADIOLOGY & ADDITIONAL TESTS:

## 2020-12-17 NOTE — PROGRESS NOTE ADULT - SUBJECTIVE AND OBJECTIVE BOX
CC: Ear Pain     HPI: 65M with IDDM, HLD, obesity, HFpEF with mild LV diastolic dysfunction, MGUS, chronic anemia with a history of duodenal ulcer 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  Underwent OLT on 7/2/2020, post op coure c/b VRE bacteremia tx with linezolid and delirium likely in setting of CNI toxicity (FK discontinued/Everolimus initiated 7/27). Recent admission from 11/20-12/2 for OM of the R heel, s/p debridement with Podiatry, and was discharged with Cefepime x 6 weeks via PICC line.  Returns with new transaminitis, MISA, diarrhea. ENT consulted for Right Ear Pain which started a few days ago. Patient denies tinnitus and dizziness. Patient states he also has hearing loss in the right ear which has been ongoing for the past 20 years. He uses a hearing aid which his dog recently destroyed.          PAST MEDICAL & SURGICAL HISTORY:  GIB (gastrointestinal bleeding)    GERD with esophagitis  Gastritis &amp; Non Bleeding Ulcers    Hepatic encephalopathy    Obesity    Fatty liver disease, nonalcoholic    Renal stones  25 years ago    Hypertension    Neuropathy    Hypercholesteremia    Diabetes    S/P cholecystectomy      Allergies    codeine (Anaphylaxis)    Intolerances      MEDICATIONS  (STANDING):  aspirin  chewable 81 milliGRAM(s) Oral daily  chlorhexidine 2% Cloths 1 Application(s) Topical daily  dextrose 40% Gel 15 Gram(s) Oral once  dextrose 5%. 1000 milliLiter(s) (50 mL/Hr) IV Continuous <Continuous>  dextrose 5%. 1000 milliLiter(s) (100 mL/Hr) IV Continuous <Continuous>  dextrose 50% Injectable 25 Gram(s) IV Push once  dextrose 50% Injectable 12.5 Gram(s) IV Push once  dextrose 50% Injectable 25 Gram(s) IV Push once  everolimus (ZORTRESS) 1.5 milliGRAM(s) Oral <User Schedule>  glucagon  Injectable 1 milliGRAM(s) IntraMuscular once  heparin   Injectable 5000 Unit(s) SubCutaneous every 12 hours  influenza   Vaccine 0.5 milliLiter(s) IntraMuscular once  insulin glargine Injectable (LANTUS) 10 Unit(s) SubCutaneous at bedtime  insulin lispro (ADMELOG) corrective regimen sliding scale   SubCutaneous three times a day before meals  insulin lispro (ADMELOG) corrective regimen sliding scale   SubCutaneous at bedtime  magnesium oxide 400 milliGRAM(s) Oral daily  meropenem  IVPB 500 milliGRAM(s) IV Intermittent every 12 hours  meropenem  IVPB      nystatin    Suspension 729883 Unit(s) Oral four times a day  pantoprazole    Tablet 40 milliGRAM(s) Oral before breakfast  predniSONE   Tablet 5 milliGRAM(s) Oral two times a day  sodium bicarbonate 1300 milliGRAM(s) Oral three times a day  tamsulosin 0.4 milliGRAM(s) Oral at bedtime  valGANciclovir 450 milliGRAM(s) Oral <User Schedule>  vancomycin    Solution 125 milliGRAM(s) Oral every 6 hours    MEDICATIONS  (PRN):    SOCIAL HISTORY: No smoking, drugs, or alcohol. Lives in [place] with family.     FAMILY HISTORY:  Family history of type 2 diabetes mellitus  Family history of hypertension  Family history of stomach cancer     ROS:   ENT: all negative except as noted in HPI       Vital Signs Last 24 Hrs  T(C): 36.7 (17 Dec 2020 21:00), Max: 38.3 (17 Dec 2020 05:00)  T(F): 98.1 (17 Dec 2020 21:00), Max: 101 (17 Dec 2020 05:00)  HR: 87 (17 Dec 2020 21:00) (78 - 90)  BP: 148/69 (17 Dec 2020 21:00) (125/57 - 151/66)  BP(mean): --  RR: 18 (17 Dec 2020 21:00) (18 - 18)  SpO2: 95% (17 Dec 2020 21:00) (94% - 97%)                          7.8    7.85  )-----------( 159      ( 17 Dec 2020 06:16 )             23.8    12-17    132<L>  |  100  |  72<H>  ----------------------------<  158<H>  4.0   |  15<L>  |  6.06<H>    Ca    8.5      17 Dec 2020 06:16  Phos  3.4     12-17  Mg     2.1     12-17    TPro  5.9<L>  /  Alb  2.6<L>  /  TBili  0.2  /  DBili  x   /  AST  43<H>  /  ALT  67<H>  /  AlkPhos  221<H>  12-17   PT/INR - ( 17 Dec 2020 06:16 )   PT: 14.1 sec;   INR: 1.18 ratio         PTT - ( 17 Dec 2020 06:16 )  PTT:28.4 sec    PHYSICAL EXAM:  Gen: NAD  Skin: No rashes, bruises, or lesions  Head: Normocephalic, Atraumatic  Face: no edema, erythema, or fluctuance. Parotid glands soft without mass  Eyes: no scleral injection  Ears: Right - ear canal narrowing, TM intact without erythema. No evidence of any fluid drainage. No mastoid tenderness, erythema, or ear bulging. Some cerumen noted. Removed            Left - ear canal clear, TM intact without effusion or erythema. No evidence of any fluid drainage. No mastoid tenderness, erythema, or ear bulging  Nose: Nares bilaterally patent, no discharge  Mouth: No Stridor / Drooling / Trismus.  Mucosa moist, tongue/uvula midline, oropharynx clear  Neck: Flat, supple, no lymphadenopathy, trachea midline, no masses  Lymphatic: No lymphadenopathy  Resp: breathing easily, no stridor  CV: no peripheral edema/cyanosis  GI: nondistended   Peripheral vascular: no JVD or edema  Neuro: facial nerve intact, no facial droop

## 2020-12-18 DIAGNOSIS — A04.72 ENTEROCOLITIS DUE TO CLOSTRIDIUM DIFFICILE, NOT SPECIFIED AS RECURRENT: ICD-10-CM

## 2020-12-18 DIAGNOSIS — R74.01 ELEVATION OF LEVELS OF LIVER TRANSAMINASE LEVELS: ICD-10-CM

## 2020-12-18 LAB
ALBUMIN SERPL ELPH-MCNC: 2.8 G/DL — LOW (ref 3.3–5)
ALP SERPL-CCNC: 252 U/L — HIGH (ref 40–120)
ALT FLD-CCNC: 61 U/L — HIGH (ref 10–45)
AMMONIA BLD-MCNC: 36 UMOL/L — SIGNIFICANT CHANGE UP (ref 11–55)
ANION GAP SERPL CALC-SCNC: 17 MMOL/L — SIGNIFICANT CHANGE UP (ref 5–17)
ANION GAP SERPL CALC-SCNC: 17 MMOL/L — SIGNIFICANT CHANGE UP (ref 5–17)
APTT BLD: 27.5 SEC — SIGNIFICANT CHANGE UP (ref 27.5–35.5)
AST SERPL-CCNC: 43 U/L — HIGH (ref 10–40)
BASOPHILS # BLD AUTO: 0.02 K/UL — SIGNIFICANT CHANGE UP (ref 0–0.2)
BASOPHILS NFR BLD AUTO: 0.3 % — SIGNIFICANT CHANGE UP (ref 0–2)
BILIRUB SERPL-MCNC: 0.1 MG/DL — LOW (ref 0.2–1.2)
BLD GP AB SCN SERPL QL: NEGATIVE — SIGNIFICANT CHANGE UP
BUN SERPL-MCNC: 88 MG/DL — HIGH (ref 7–23)
BUN SERPL-MCNC: 90 MG/DL — HIGH (ref 7–23)
C3 SERPL-MCNC: 158 MG/DL — HIGH (ref 81–157)
C4 SERPL-MCNC: 38 MG/DL — SIGNIFICANT CHANGE UP (ref 13–39)
CALCIUM SERPL-MCNC: 8.4 MG/DL — SIGNIFICANT CHANGE UP (ref 8.4–10.5)
CALCIUM SERPL-MCNC: 8.7 MG/DL — SIGNIFICANT CHANGE UP (ref 8.4–10.5)
CHLORIDE SERPL-SCNC: 104 MMOL/L — SIGNIFICANT CHANGE UP (ref 96–108)
CHLORIDE SERPL-SCNC: 105 MMOL/L — SIGNIFICANT CHANGE UP (ref 96–108)
CK SERPL-CCNC: 43 U/L — SIGNIFICANT CHANGE UP (ref 30–200)
CO2 SERPL-SCNC: 15 MMOL/L — LOW (ref 22–31)
CO2 SERPL-SCNC: 15 MMOL/L — LOW (ref 22–31)
CREAT SERPL-MCNC: 6.6 MG/DL — HIGH (ref 0.5–1.3)
CREAT SERPL-MCNC: 6.63 MG/DL — HIGH (ref 0.5–1.3)
CULTURE RESULTS: NO GROWTH — SIGNIFICANT CHANGE UP
EOSINOPHIL # BLD AUTO: 0.03 K/UL — SIGNIFICANT CHANGE UP (ref 0–0.5)
EOSINOPHIL NFR BLD AUTO: 0.5 % — SIGNIFICANT CHANGE UP (ref 0–6)
EVEROLIMUS, WHOLE BLOOD RESULT: 7.2 NG/ML — SIGNIFICANT CHANGE UP (ref 3–8)
GLUCOSE BLDC GLUCOMTR-MCNC: 189 MG/DL — HIGH (ref 70–99)
GLUCOSE BLDC GLUCOMTR-MCNC: 190 MG/DL — HIGH (ref 70–99)
GLUCOSE BLDC GLUCOMTR-MCNC: 210 MG/DL — HIGH (ref 70–99)
GLUCOSE BLDC GLUCOMTR-MCNC: 213 MG/DL — HIGH (ref 70–99)
GLUCOSE SERPL-MCNC: 186 MG/DL — HIGH (ref 70–99)
GLUCOSE SERPL-MCNC: 212 MG/DL — HIGH (ref 70–99)
HCT VFR BLD CALC: 25.9 % — LOW (ref 39–50)
HGB BLD-MCNC: 8.3 G/DL — LOW (ref 13–17)
IMM GRANULOCYTES NFR BLD AUTO: 1.1 % — SIGNIFICANT CHANGE UP (ref 0–1.5)
INR BLD: 1.2 RATIO — HIGH (ref 0.88–1.16)
LYMPHOCYTES # BLD AUTO: 0.6 K/UL — LOW (ref 1–3.3)
LYMPHOCYTES # BLD AUTO: 9.2 % — LOW (ref 13–44)
MAGNESIUM SERPL-MCNC: 2.4 MG/DL — SIGNIFICANT CHANGE UP (ref 1.6–2.6)
MCHC RBC-ENTMCNC: 28.8 PG — SIGNIFICANT CHANGE UP (ref 27–34)
MCHC RBC-ENTMCNC: 32 GM/DL — SIGNIFICANT CHANGE UP (ref 32–36)
MCV RBC AUTO: 89.9 FL — SIGNIFICANT CHANGE UP (ref 80–100)
MONOCYTES # BLD AUTO: 0.48 K/UL — SIGNIFICANT CHANGE UP (ref 0–0.9)
MONOCYTES NFR BLD AUTO: 7.4 % — SIGNIFICANT CHANGE UP (ref 2–14)
NEUTROPHILS # BLD AUTO: 5.32 K/UL — SIGNIFICANT CHANGE UP (ref 1.8–7.4)
NEUTROPHILS NFR BLD AUTO: 81.5 % — HIGH (ref 43–77)
NRBC # BLD: 0 /100 WBCS — SIGNIFICANT CHANGE UP (ref 0–0)
PHOSPHATE SERPL-MCNC: 4.7 MG/DL — HIGH (ref 2.5–4.5)
PLATELET # BLD AUTO: 188 K/UL — SIGNIFICANT CHANGE UP (ref 150–400)
POTASSIUM SERPL-MCNC: 4.4 MMOL/L — SIGNIFICANT CHANGE UP (ref 3.5–5.3)
POTASSIUM SERPL-MCNC: 4.6 MMOL/L — SIGNIFICANT CHANGE UP (ref 3.5–5.3)
POTASSIUM SERPL-SCNC: 4.4 MMOL/L — SIGNIFICANT CHANGE UP (ref 3.5–5.3)
POTASSIUM SERPL-SCNC: 4.6 MMOL/L — SIGNIFICANT CHANGE UP (ref 3.5–5.3)
PROT SERPL-MCNC: 6 G/DL — SIGNIFICANT CHANGE UP (ref 6–8.3)
PROTHROM AB SERPL-ACNC: 14.3 SEC — HIGH (ref 10.6–13.6)
RBC # BLD: 2.88 M/UL — LOW (ref 4.2–5.8)
RBC # FLD: 14 % — SIGNIFICANT CHANGE UP (ref 10.3–14.5)
RH IG SCN BLD-IMP: POSITIVE — SIGNIFICANT CHANGE UP
SODIUM SERPL-SCNC: 136 MMOL/L — SIGNIFICANT CHANGE UP (ref 135–145)
SODIUM SERPL-SCNC: 137 MMOL/L — SIGNIFICANT CHANGE UP (ref 135–145)
SPECIMEN SOURCE: SIGNIFICANT CHANGE UP
WBC # BLD: 6.52 K/UL — SIGNIFICANT CHANGE UP (ref 3.8–10.5)
WBC # FLD AUTO: 6.52 K/UL — SIGNIFICANT CHANGE UP (ref 3.8–10.5)

## 2020-12-18 PROCEDURE — 99232 SBSQ HOSP IP/OBS MODERATE 35: CPT | Mod: GC

## 2020-12-18 PROCEDURE — 99233 SBSQ HOSP IP/OBS HIGH 50: CPT

## 2020-12-18 RX ORDER — SODIUM BICARBONATE 1 MEQ/ML
0.05 SYRINGE (ML) INTRAVENOUS
Qty: 75 | Refills: 0 | Status: DISCONTINUED | OUTPATIENT
Start: 2020-12-18 | End: 2020-12-20

## 2020-12-18 RX ORDER — INSULIN LISPRO 100/ML
5 VIAL (ML) SUBCUTANEOUS
Refills: 0 | Status: DISCONTINUED | OUTPATIENT
Start: 2020-12-18 | End: 2020-12-19

## 2020-12-18 RX ORDER — MEROPENEM 1 G/30ML
500 INJECTION INTRAVENOUS EVERY 24 HOURS
Refills: 0 | Status: DISCONTINUED | OUTPATIENT
Start: 2020-12-18 | End: 2020-12-19

## 2020-12-18 RX ORDER — SODIUM CHLORIDE 9 MG/ML
500 INJECTION INTRAMUSCULAR; INTRAVENOUS; SUBCUTANEOUS ONCE
Refills: 0 | Status: COMPLETED | OUTPATIENT
Start: 2020-12-18 | End: 2020-12-18

## 2020-12-18 RX ORDER — OFLOXACIN 0.3 %
10 DROPS OPHTHALMIC (EYE)
Refills: 0 | Status: DISCONTINUED | OUTPATIENT
Start: 2020-12-18 | End: 2020-12-21

## 2020-12-18 RX ADMIN — SODIUM CHLORIDE 500 MILLILITER(S): 9 INJECTION INTRAMUSCULAR; INTRAVENOUS; SUBCUTANEOUS at 16:19

## 2020-12-18 RX ADMIN — MAGNESIUM OXIDE 400 MG ORAL TABLET 400 MILLIGRAM(S): 241.3 TABLET ORAL at 13:41

## 2020-12-18 RX ADMIN — Medication 500000 UNIT(S): at 13:41

## 2020-12-18 RX ADMIN — Medication 500000 UNIT(S): at 18:05

## 2020-12-18 RX ADMIN — Medication 500000 UNIT(S): at 05:53

## 2020-12-18 RX ADMIN — Medication 125 MILLIGRAM(S): at 00:42

## 2020-12-18 RX ADMIN — Medication 75 MEQ/KG/HR: at 22:15

## 2020-12-18 RX ADMIN — Medication 1300 MILLIGRAM(S): at 22:14

## 2020-12-18 RX ADMIN — Medication 1300 MILLIGRAM(S): at 05:52

## 2020-12-18 RX ADMIN — Medication 125 MILLIGRAM(S): at 23:14

## 2020-12-18 RX ADMIN — HEPARIN SODIUM 5000 UNIT(S): 5000 INJECTION INTRAVENOUS; SUBCUTANEOUS at 18:05

## 2020-12-18 RX ADMIN — MEROPENEM 100 MILLIGRAM(S): 1 INJECTION INTRAVENOUS at 05:52

## 2020-12-18 RX ADMIN — Medication 1: at 14:30

## 2020-12-18 RX ADMIN — Medication 5 MILLIGRAM(S): at 18:05

## 2020-12-18 RX ADMIN — Medication 125 MILLIGRAM(S): at 05:53

## 2020-12-18 RX ADMIN — HEPARIN SODIUM 5000 UNIT(S): 5000 INJECTION INTRAVENOUS; SUBCUTANEOUS at 05:53

## 2020-12-18 RX ADMIN — Medication 500000 UNIT(S): at 23:14

## 2020-12-18 RX ADMIN — Medication 81 MILLIGRAM(S): at 13:41

## 2020-12-18 RX ADMIN — Medication 1: at 18:04

## 2020-12-18 RX ADMIN — INSULIN GLARGINE 10 UNIT(S): 100 INJECTION, SOLUTION SUBCUTANEOUS at 22:14

## 2020-12-18 RX ADMIN — EVEROLIMUS 1.5 MILLIGRAM(S): 10 TABLET ORAL at 08:16

## 2020-12-18 RX ADMIN — Medication 1300 MILLIGRAM(S): at 13:42

## 2020-12-18 RX ADMIN — PANTOPRAZOLE SODIUM 40 MILLIGRAM(S): 20 TABLET, DELAYED RELEASE ORAL at 05:53

## 2020-12-18 RX ADMIN — Medication 2: at 10:55

## 2020-12-18 RX ADMIN — MEROPENEM 100 MILLIGRAM(S): 1 INJECTION INTRAVENOUS at 16:19

## 2020-12-18 RX ADMIN — Medication 125 MILLIGRAM(S): at 18:06

## 2020-12-18 RX ADMIN — VALGANCICLOVIR 450 MILLIGRAM(S): 450 TABLET, FILM COATED ORAL at 13:41

## 2020-12-18 RX ADMIN — Medication 500000 UNIT(S): at 00:42

## 2020-12-18 RX ADMIN — EVEROLIMUS 1.5 MILLIGRAM(S): 10 TABLET ORAL at 19:54

## 2020-12-18 RX ADMIN — Medication 125 MILLIGRAM(S): at 13:42

## 2020-12-18 RX ADMIN — Medication 5 MILLIGRAM(S): at 05:52

## 2020-12-18 RX ADMIN — TAMSULOSIN HYDROCHLORIDE 0.4 MILLIGRAM(S): 0.4 CAPSULE ORAL at 22:14

## 2020-12-18 RX ADMIN — CHLORHEXIDINE GLUCONATE 1 APPLICATION(S): 213 SOLUTION TOPICAL at 13:42

## 2020-12-18 NOTE — PROGRESS NOTE ADULT - ASSESSMENT
65 yr old man with liver transplant in July 2020, right plantar osteo s/p debridement on IV cefepime for ~2 weeks.   He is admitted with MISA. Serum creatinine was 1.3-1.5mg/dL on previous admission few weeks ago.   Stool is +ve for C.diff but diarrhea has now subsided. U/A with leukocytes on admission but cultures remain negative.   Non obstructive distal ureteric stone on CT. U/S with enlarged kidneys, no olga hydro   Right foot osteo s/p graft, looks clean and not infected. Cefepime changed to meropenem.       MISA due to AIN from cefepime vs ATN in the setting of sepsis. He remains hemodynamically stable. Fever has subsided. Repeat U/A shows pyuria. Complements normal.   BUN/Cr continue to rise 88/6.6 today, he remains non oliguric, mental status is intact, no asterixes electrolytes and volume status fair. No indication for dialysis at this time.   Continue sodium bicarb to 1300mg po TID for metabolic acidosis. Will continue to monitor daily chem and UOP and assess need for dialysis.

## 2020-12-18 NOTE — PROGRESS NOTE ADULT - SUBJECTIVE AND OBJECTIVE BOX
[facility] DIVISION OF KIDNEY DISEASES AND HYPERTENSION -- FOLLOW UP NOTE  --------------------------------------------------------------------------------  Authored by: Adele Perezte   Cell # [phone]     JESSICA MARTIN was seen and examined at bedside.     HPI: 65 yr old man with history of JEAN/Cirrhosis 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 on immunosuppression with Everolimus and CellCept. He is admitted with MISA and new transaminitis.     PMH: DM on insulin, hyperlipidemia, mild diastolic dysfunction, MGUS, chronic anemia, duodenal ulcer.   Post liver transplant he was unable to tolerate CNI due to neurotoxicity and was switched to Everolimus.   He has non healing left heal ulcer that was recently complicated by osteomyelitis s/p debridement by podiatry, he was on IV cefepime x 6 week course of which he completed 2 weeks.     Overnight no major events. No diarrhea. Fever has subsides. UOP 1.3 liters.     Standing Inpatient Medications  aspirin  chewable 81 milliGRAM(s) Oral daily  everolimus (ZORTRESS) 1.5 milliGRAM(s) Oral <User Schedule>  heparin   Injectable 5000 Unit(s) SubCutaneous every 12 hours  insulin glargine Injectable (LANTUS) 10 Unit(s) SubCutaneous at bedtime  insulin lispro (ADMELOG) corrective regimen sliding scale   SubCutaneous three times a day before meals  insulin lispro (ADMELOG) corrective regimen sliding scale   SubCutaneous at bedtime  magnesium oxide 400 milliGRAM(s) Oral daily  meropenem  IVPB 500 milliGRAM(s) IV Intermittent every 12 hours  nystatin    Suspension 129901 Unit(s) Oral four times a day  ofloxacin 0.3% Solution 10 Drop(s) Right Ear two times a day  pantoprazole    Tablet 40 milliGRAM(s) Oral before breakfast  predniSONE   Tablet 5 milliGRAM(s) Oral two times a day  sodium bicarbonate 1300 milliGRAM(s) Oral three times a day  tamsulosin 0.4 milliGRAM(s) Oral at bedtime  valGANciclovir 450 milliGRAM(s) Oral <User Schedule>  vancomycin    Solution 125 milliGRAM(s) Oral every 6 hours      VITALS/PHYSICAL EXAM  --------------------------------------------------------------------------------  T(C): 36.8 (12-18-20 @ 09:13), Max: 37.1 (12-17-20 @ 17:00)  HR: 78 (12-18-20 @ 09:13) (78 - 87)  BP: 153/76 (12-18-20 @ 09:13) (125/57 - 153/76)  RR: 16 (12-18-20 @ 09:13) (16 - 18)  SpO2: 98% (12-18-20 @ 09:13) (94% - 98%)    12-17-20 @ 07:01  -  12-18-20 @ 07:00  --------------------------------------------------------  IN: 420 mL / OUT: 1355 mL / NET: -935 mL      Physical Exam:  Awake and alert, pale, no signs of distress  moist oral mucosa  no JVD  lungs clear b/l  Systolic murmur at apex   Abdomen obese, soft, non tender  RLE trace edema, LLE no edema, right heel dressing   Alert and oriented, No asterixis       LABS/STUDIES  --------------------------------------------------------------------------------              8.3    6.52  >-----------<  188      [12-18-20 @ 06:18]              25.9     136  |  104  |  88  ----------------------------<  212      [12-18-20 @ 06:17]  4.6   |  15  |  6.60        Ca     8.7     [12-18-20 @ 06:17]      Mg     2.4     [12-18-20 @ 06:17]      Phos  4.7     [12-18-20 @ 06:17]    TPro  6.0  /  Alb  2.8  /  TBili  0.1  /  DBili  x   /  AST  43  /  ALT  61  /  AlkPhos  252  [12-18-20 @ 06:17]    PT/INR: PT 14.3 , INR 1.20       [12-18-20 @ 06:18]  PTT: 27.5       [12-18-20 @ 06:18]    CK 43      [12-18-20 @ 06:17]    Creatinine Trend:  SCr 6.60 [12-18 @ 06:17]  SCr 6.06 [12-17 @ 06:16]  SCr 5.37 [12-16 @ 15:41]  SCr 4.93 [12-16 @ 05:39]  SCr 3.91 [12-15 @ 06:21]      CAPILLARY BLOOD GLUCOSE  POCT Blood Glucose.: 213 mg/dL (18 Dec 2020 10:35)  POCT Blood Glucose.: 218 mg/dL (17 Dec 2020 22:17)  POCT Blood Glucose.: 236 mg/dL (17 Dec 2020 18:51)  POCT Blood Glucose.: 164 mg/dL (17 Dec 2020 12:34)    Urinalysis - [12-17-20 @ 16:54]      Color Light Yellow / Appearance Slightly Turbid / SG 1.012 / pH 6.0      Gluc 100 mg/dL / Ketone Negative  / Bili Negative / Urobili Negative       Blood Moderate / Protein 100 mg/dL / Leuk Est Large / Nitrite Negative      RBC 3 /  / Hyaline  / Gran  / Sq Epi  / Non Sq Epi 0 / Bacteria Negative    Urine Creatinine 67      [12-15-20 @ 11:39]  Urine Protein 126      [12-17-20 @ 14:14]  Urine Sodium 44      [12-15-20 @ 11:39]  Urine Urea Nitrogen 367      [12-15-20 @ 15:35]  Urine Chloride 43      [12-15-20 @ 11:39]  Urine Osmolality 311      [12-15-20 @ 11:39]    Iron 160, TIBC Unable to calculate Test Repeated, %sat Unable to calculate Test Repeated      [06-12-20 @ 09:04]  Ferritin 493      [04-29-20 @ 08:20]  HbA1c 6.4      [02-12-20 @ 17:08]  TSH 3.26      [04-26-20 @ 09:47]  Lipid: chol 158, , HDL 44, LDL 93      [08-05-20 @ 08:57]      C3 Complement 158      [12-17-20 @ 16:43]  C4 Complement 38      [12-17-20 @ 16:43]

## 2020-12-18 NOTE — PROGRESS NOTE ADULT - SUBJECTIVE AND OBJECTIVE BOX
Follow Up:  CDI, Abnormal U/A, Fever    Interval History: afebrile overnight. denies pain. denies diarrhea.     REVIEW OF SYSTEMS  [  ] ROS unobtainable because:    [ x ] All other systems negative except as noted below    Constitutional:  [ ] fever [ ] chills  [ ] weight loss  [ ] weakness  Skin:  [ ] rash [ ] phlebitis	  Eyes: [ ] icterus [ ] pain  [ ] discharge	  ENMT: [ ] sore throat  [ ] thrush [ ] ulcers [ ] exudates  Respiratory: [ ] dyspnea [ ] hemoptysis [ ] cough [ ] sputum	  Cardiovascular:  [ ] chest pain [ ] palpitations [ ] edema	  Gastrointestinal:  [ ] nausea [ ] vomiting [ ] diarrhea [ ] constipation [ ] pain	  Genitourinary:  [ ] dysuria [ ] frequency [ ] hematuria [ ] discharge [ ] flank pain  [ ] incontinence  Musculoskeletal:  [ ] myalgias [ ] arthralgias [ ] arthritis  [ ] back pain  Neurological:  [ ] headache [ ] seizures  [ ] confusion/altered mental status    Allergies  codeine (Anaphylaxis)        ANTIMICROBIALS:  meropenem  IVPB 500 every 24 hours  nystatin    Suspension 692365 four times a day  valGANciclovir 450 <User Schedule>  vancomycin    Solution 125 every 6 hours      OTHER MEDS:  MEDICATIONS  (STANDING):  aspirin  chewable 81 daily  dextrose 40% Gel 15 once  dextrose 50% Injectable 25 once  dextrose 50% Injectable 12.5 once  dextrose 50% Injectable 25 once  everolimus (ZORTRESS) 1.5 <User Schedule>  glucagon  Injectable 1 once  heparin   Injectable 5000 every 12 hours  insulin glargine Injectable (LANTUS) 10 at bedtime  insulin lispro (ADMELOG) corrective regimen sliding scale  three times a day before meals  insulin lispro (ADMELOG) corrective regimen sliding scale  at bedtime  pantoprazole    Tablet 40 before breakfast  predniSONE   Tablet 5 two times a day  tamsulosin 0.4 at bedtime      Vital Signs Last 24 Hrs  T(C): 36.6 (18 Dec 2020 12:51), Max: 36.9 (18 Dec 2020 01:00)  T(F): 97.9 (18 Dec 2020 12:51), Max: 98.5 (18 Dec 2020 01:00)  HR: 75 (18 Dec 2020 12:51) (75 - 87)  BP: 143/70 (18 Dec 2020 12:51) (131/68 - 153/76)  BP(mean): 93 (18 Dec 2020 01:00) (93 - 93)  RR: 16 (18 Dec 2020 12:51) (16 - 18)  SpO2: 96% (18 Dec 2020 12:51) (95% - 98%)    PHYSICAL EXAMINATION:  General: Alert and Awake, NAD  HEENT: PERRL, EOMI  Neck: Supple  Cardiac: RRR, No M/R/G  Resp: CTAB, No Wh/Rh/Ra  Abdomen: NBS, NT/ND, No HSM, No rigidity or guarding  MSK: +RLE foot with overlying dressing. No LE edema. No Calf tenderness  : No bob  Skin: No rashes or lesions. Skin is warm and dry to the touch.   Neuro: Alert and Awake. CN 2-12 Grossly intact. Moves all four extremities spontaneously.  Psych: Calm, Pleasant, Cooperative                          8.3    6.52  )-----------( 188      ( 18 Dec 2020 06:18 )             25.9       12-18    137  |  105  |  90<H>  ----------------------------<  186<H>  4.4   |  15<L>  |  6.63<H>    Ca    8.4      18 Dec 2020 16:14  Phos  4.7       Mg     2.4         TPro  6.0  /  Alb  2.8<L>  /  TBili  0.1<L>  /  DBili  x   /  AST  43<H>  /  ALT  61<H>  /  AlkPhos  252<H>        Urinalysis Basic - ( 17 Dec 2020 16:54 )    Color: Light Yellow / Appearance: Slightly Turbid / S.012 / pH: x  Gluc: x / Ketone: Negative  / Bili: Negative / Urobili: Negative   Blood: x / Protein: 100 mg/dL / Nitrite: Negative   Leuk Esterase: Large / RBC: 3 /hpf /  /HPF   Sq Epi: x / Non Sq Epi: 0 /hpf / Bacteria: Negative        MICROBIOLOGY:  v  .Urine Catheterized  20   No growth  --  --      .Catheter PICC line tip  12-15-20   < 15 colonies Coag Negative Staphylococcus  --  --      .Urine Clean Catch (Midstream)  12-15-20   <10,000 CFU/mL Normal Urogenital Ninfa  --  --      .Blood PICC/PERC Single Lumen  20   No growth to date.  --  --      .Tissue Other  20   Growth in fluid media only Staphylococcus epidermidis  "Susceptibilities not performed"  --  Staphylococcus epidermidis      Skin wound  20   Few Pseudomonas aeruginosa  Normal skin nnifa isolated  --  Pseudomonas aeruginosa      .Blood Blood-Peripheral  20   No Growth Final  --  --          Rapid RVP Result: NotDetec ( @ 18:36)  C Diff by PCR Result: Detected (12-15 @ 14:53)    C Diff by PCR Result: Detected (12-15-20 @ 14:53)  Clostridium difficile GDH Toxins A&amp;B, EIA:   Indeterminate (12-15-20 @ 11:23)  Clostridium difficile GDH Interpretation: This specimen is positive for C. Difficile glutamate dehydrogenase (GDH)  antigen and negative for C. Difficile Toxins A & B, by EIA.  GDH is a  highly sensitive screening marker for C. Difficile that is produced in  large amounts by all C. Difficilestrains, both toxigenic and  nontoxigenic.  Specimens that are GDH positive and toxin negative will be  tested further by PCR to detect toxin gene sequences associated with  toxin producing C. Difficle. (12-15-20 @ 11:23)      RADIOLOGY:    <The imaging below has been reviewed and visualized by me independently. Findings as detailed in report below>    EXAM:  US KIDNEYS AND BLADDER                        PROCEDURE DATE:  2020    Fullness of the left collecting system.  Left upper quadrant ascites.      EXAM:  CT ABDOMEN AND PELVIS                        PROCEDURE DATE:  12/15/2020    A 5 mm distal left ureteral calculus. No associated hydronephrosis.  Diffuse wall thickening, slightly asymmetric on the left may be related to urinary tract infection. Consider follow-up cystoscopy.  No evidence of a prostatic abscess as questioned.

## 2020-12-18 NOTE — PROGRESS NOTE ADULT - SUBJECTIVE AND OBJECTIVE BOX
Chief Complaint:  Patient is a 65y old  Male who presents with a chief complaint of transaminitis (18 Dec 2020 08:34)      Interval Events: No overnight events. Patient making urine. Diarrhea resolved. Leg pain improved.    Allergies:  codeine (Anaphylaxis)      Hospital Medications:  aspirin  chewable 81 milliGRAM(s) Oral daily  chlorhexidine 2% Cloths 1 Application(s) Topical daily  dextrose 40% Gel 15 Gram(s) Oral once  dextrose 5%. 1000 milliLiter(s) IV Continuous <Continuous>  dextrose 5%. 1000 milliLiter(s) IV Continuous <Continuous>  dextrose 50% Injectable 25 Gram(s) IV Push once  dextrose 50% Injectable 12.5 Gram(s) IV Push once  dextrose 50% Injectable 25 Gram(s) IV Push once  everolimus (ZORTRESS) 1.5 milliGRAM(s) Oral <User Schedule>  glucagon  Injectable 1 milliGRAM(s) IntraMuscular once  heparin   Injectable 5000 Unit(s) SubCutaneous every 12 hours  insulin glargine Injectable (LANTUS) 10 Unit(s) SubCutaneous at bedtime  insulin lispro (ADMELOG) corrective regimen sliding scale   SubCutaneous three times a day before meals  insulin lispro (ADMELOG) corrective regimen sliding scale   SubCutaneous at bedtime  magnesium oxide 400 milliGRAM(s) Oral daily  meropenem  IVPB 500 milliGRAM(s) IV Intermittent every 12 hours  meropenem  IVPB      nystatin    Suspension 771007 Unit(s) Oral four times a day  pantoprazole    Tablet 40 milliGRAM(s) Oral before breakfast  predniSONE   Tablet 5 milliGRAM(s) Oral two times a day  sodium bicarbonate 1300 milliGRAM(s) Oral three times a day  tamsulosin 0.4 milliGRAM(s) Oral at bedtime  valGANciclovir 450 milliGRAM(s) Oral <User Schedule>  vancomycin    Solution 125 milliGRAM(s) Oral every 6 hours      PMHX/PSHX:  GIB (gastrointestinal bleeding)    GERD with esophagitis    Hepatic encephalopathy    Obesity    Fatty liver disease, nonalcoholic    Renal stones    Hypertension    Neuropathy    Hypercholesteremia    Diabetes    S/P cholecystectomy    No significant past surgical history        Family history:  Family history of type 2 diabetes mellitus    Family history of hypertension    Family history of stomach cancer    No pertinent family history in first degree relatives        ROS: As per HPI, 14-point ROS negative otherwise.    General:  No wt loss, fevers, chills, night sweats, fatigue,   Eyes:  Good vision, no reported pain  ENT:  No sore throat, pain, runny nose, dysphagia  CV:  No pain, palpitations, hypo/hypertension  Resp:  No dyspnea, cough, tachypnea, wheezing  GI:  See HPI  :  No pain, bleeding, incontinence, nocturia  Muscle:  No pain, weakness  Neuro:  No weakness, tingling, memory problems  Psych:  No fatigue, insomnia, mood problems, depression  Endocrine:  No polyuria, polydipsia, cold/heat intolerance  Heme:  No petechiae, ecchymosis, easy bruisability  Skin:  No rash, edema      PHYSICAL EXAM:     Vital Signs:  Vital Signs Last 24 Hrs  T(C): 36.8 (18 Dec 2020 09:13), Max: 37.1 (17 Dec 2020 17:00)  T(F): 98.2 (18 Dec 2020 09:13), Max: 98.7 (17 Dec 2020 17:00)  HR: 78 (18 Dec 2020 09:13) (78 - 87)  BP: 153/76 (18 Dec 2020 09:13) (125/57 - 153/76)  BP(mean): 93 (18 Dec 2020 01:00) (93 - 93)  RR: 16 (18 Dec 2020 09:13) (16 - 18)  SpO2: 98% (18 Dec 2020 09:13) (94% - 98%)  Daily     Daily Weight in k.9 (18 Dec 2020 05:46)    GENERAL:  appears comfortable, no acute distress  HEENT:  NC/AT,  conjunctivae clear, sclera -anicteric  CHEST:  no increased effort  HEART:  Regular rate and rhythm  ABDOMEN:  Soft, non-tender, non-distended,  no masses ,no hepato-splenomegaly,   EXTREMITIES:  no cyanosis, clubbing or edema  SKIN:  right foot wrapped  NEURO:  Alert, oriented    LABS:                        8.3    6.52  )-----------( 188      ( 18 Dec 2020 06:18 )             25.9     12-18    136  |  104  |  88<H>  ----------------------------<  212<H>  4.6   |  15<L>  |  6.60<H>    Ca    8.7      18 Dec 2020 06:17  Phos  4.7     12-18  Mg     2.4     12-18    TPro  6.0  /  Alb  2.8<L>  /  TBili  0.1<L>  /  DBili  x   /  AST  43<H>  /  ALT  61<H>  /  AlkPhos  252<H>  12-18    LIVER FUNCTIONS - ( 18 Dec 2020 06:17 )  Alb: 2.8 g/dL / Pro: 6.0 g/dL / ALK PHOS: 252 U/L / ALT: 61 U/L / AST: 43 U/L / GGT: x           PT/INR - ( 18 Dec 2020 06:18 )   PT: 14.3 sec;   INR: 1.20 ratio         PTT - ( 18 Dec 2020 06:18 )  PTT:27.5 sec  Urinalysis Basic - ( 17 Dec 2020 16:54 )    Color: Light Yellow / Appearance: Slightly Turbid / S.012 / pH: x  Gluc: x / Ketone: Negative  / Bili: Negative / Urobili: Negative   Blood: x / Protein: 100 mg/dL / Nitrite: Negative   Leuk Esterase: Large / RBC: 3 /hpf /  /HPF   Sq Epi: x / Non Sq Epi: 0 /hpf / Bacteria: Negative      Amylase Serum--      Lipase serum--       [id number]      Imaging:

## 2020-12-18 NOTE — CONSULT NOTE ADULT - ATTENDING COMMENTS
Pt seen and examined on admission to SICU with resident and PA team, agree with above. Pt notes that he is not having any pain currently, and that his diarrhea has improved.     1. C. diff colitis during treatment with cefepime for R heel osteo:  - Cefepime has been discontinued  - On po vanc with improvement in diarrhea  - Diet    2. R heel osteo:  - Was on cefepime, now on meropenem    3. MISA stage 3, ?AIN (urine eosinophils) related to cefepime?  - Off cefepime currently  - Avoid nephrotoxic meds  - No emergent need for HD currently, is making urine  - On bicarb gtt and bicarb po for metabolic acidosis  - Potassium 4.4  - BUN 90 but mental status on admission to SICU was normal  - Med doses adjusted (will confirm Valcyte dose with Tx; ordered for MWF but ID recommended decreasing to twice weekly given renal function    4. Transaminitis s/p OLT in July 2020:  - Improving  - Continue immunosuppression per Tx recs Pt seen and examined on admission to SICU with resident and PA team, agree with above. Pt notes that he is not having any pain currently, and that his diarrhea has improved.     1. C. diff colitis during treatment with cefepime for R heel osteo:  - Cefepime has been discontinued  - On po vanc with improvement in diarrhea  - Diet    2. R heel osteo, UTI  - Was on cefepime, now on meropenem    3. MISA stage 3, ?AIN (urine eosinophils) related to cefepime?  - Off cefepime currently  - Avoid nephrotoxic meds  - No emergent need for HD currently, is making urine  - On bicarb gtt and bicarb po for metabolic acidosis  - Potassium 4.4  - BUN 90 but mental status on admission to SICU was normal  - Med doses adjusted (will confirm Valcyte dose with Tx; ordered for MWF but ID recommended decreasing to twice weekly given renal function    4. Transaminitis s/p OLT in July 2020:  - Improving  - Continue immunosuppression per Tx recs

## 2020-12-18 NOTE — CONSULT NOTE ADULT - CONSULT REQUESTED DATE/TIME
15-Dec-2020 08:40
15-Dec-2020 08:48
15-Dec-2020 10:06
16-Dec-2020 12:04
18-Dec-2020 20:01
18-Dec-2020 16:50

## 2020-12-18 NOTE — CONSULT NOTE ADULT - SUBJECTIVE AND OBJECTIVE BOX
[facility]  Ramón Morales PA-C  [address] [place] [address]  [phone]      Chief Complaint:  Patient is a 65y old  Male who presents with a chief complaint of transaminitis (18 Dec 2020 11:09)      HPI: Pt is a 66yo M with PMH of IDDM, HLD, obesity, HFpEF with mild LV diastolic dysfunction, MGUS, chronic anemia with a history of duodenal ulcer as well as GAVE and duodenal AVM s/p APC (last on 10/11/19), decompensated JEAN/Cirrhosis who is s/p OLT on 2020, post op coure c/b VRE bacteremia tx with linezolid and delirium likely in setting of CNI toxicity (FK discontinued/Everolimus initiated ). Patient with recent admission from - for OM of the R heel, s/p debridement with Podiatry, and was discharged with Cefepime x 6 weeks via PICC line (placed ). Initial wound cx on admission grew Pseudomonas, OR cx grew staph epi.  He now presents with from clinic rising liver enzymes and MISA (SCr 3.3 from 1.5) with fevers c/f sepsis. Patient having diarrhea found to be cdiff positive.     Allergies:  codeine (Anaphylaxis)      Medications:  aspirin  chewable 81 milliGRAM(s) Oral daily  chlorhexidine 2% Cloths 1 Application(s) Topical daily  dextrose 40% Gel 15 Gram(s) Oral once  dextrose 5%. 1000 milliLiter(s) IV Continuous <Continuous>  dextrose 5%. 1000 milliLiter(s) IV Continuous <Continuous>  dextrose 50% Injectable 25 Gram(s) IV Push once  dextrose 50% Injectable 12.5 Gram(s) IV Push once  dextrose 50% Injectable 25 Gram(s) IV Push once  everolimus (ZORTRESS) 1.5 milliGRAM(s) Oral <User Schedule>  glucagon  Injectable 1 milliGRAM(s) IntraMuscular once  heparin   Injectable 5000 Unit(s) SubCutaneous every 12 hours  insulin glargine Injectable (LANTUS) 10 Unit(s) SubCutaneous at bedtime  insulin lispro (ADMELOG) corrective regimen sliding scale   SubCutaneous three times a day before meals  insulin lispro (ADMELOG) corrective regimen sliding scale   SubCutaneous at bedtime  magnesium oxide 400 milliGRAM(s) Oral daily  meropenem  IVPB 500 milliGRAM(s) IV Intermittent every 24 hours  nystatin    Suspension 905783 Unit(s) Oral four times a day  ofloxacin 0.3% Solution 10 Drop(s) Right Ear two times a day  pantoprazole    Tablet 40 milliGRAM(s) Oral before breakfast  predniSONE   Tablet 5 milliGRAM(s) Oral two times a day  sodium bicarbonate 1300 milliGRAM(s) Oral three times a day  sodium bicarbonate  Infusion 0.05 mEq/kG/Hr IV Continuous <Continuous>  tamsulosin 0.4 milliGRAM(s) Oral at bedtime  valGANciclovir 450 milliGRAM(s) Oral <User Schedule>  vancomycin    Solution 125 milliGRAM(s) Oral every 6 hours      PMHX/PSHX:  GIB (gastrointestinal bleeding)    GERD with esophagitis    Hepatic encephalopathy    Obesity    Fatty liver disease, nonalcoholic    Renal stones    Hypertension    Neuropathy    Hypercholesteremia    Diabetes    S/P cholecystectomy    No significant past surgical history        Family history:  Family history of type 2 diabetes mellitus    Family history of hypertension    Family history of stomach cancer    No pertinent family history in first degree relatives        Social History:     ROS:     General:  No wt loss, fevers, chills, night sweats, fatigue,   Eyes:  Good vision, no reported pain  ENT:  No sore throat, pain, runny nose, dysphagia  CV:  No pain, palpitations, hypo/hypertension  Resp:  No dyspnea, cough, tachypnea, wheezing  GI:  No pain, No nausea, No vomiting, No diarrhea, No constipation, No weight loss, No fever, No pruritis, No rectal bleeding, No tarry stools, No dysphagia,  :  No pain, bleeding, incontinence, nocturia  Muscle:  No pain, weakness  Neuro:  No weakness, tingling, memory problems  Psych:  No fatigue, insomnia, mood problems, depression  Endocrine:  No polyuria, polydipsia, cold/heat intolerance  Heme:  No petechiae, ecchymosis, easy bruisability  Skin:  No rash, tattoos, scars, edema      PHYSICAL EXAM:   Vital Signs:  Vital Signs Last 24 Hrs  T(C): 36.6 (18 Dec 2020 12:51), Max: 37.1 (17 Dec 2020 17:00)  T(F): 97.9 (18 Dec 2020 12:51), Max: 98.7 (17 Dec 2020 17:00)  HR: 75 (18 Dec 2020 12:51) (75 - 87)  BP: 143/70 (18 Dec 2020 12:51) (131/68 - 153/76)  BP(mean): 93 (18 Dec 2020 01:00) (93 - 93)  RR: 16 (18 Dec 2020 12:51) (16 - 18)  SpO2: 96% (18 Dec 2020 12:51) (94% - 98%)  Daily     Daily Weight in k.9 (18 Dec 2020 05:46)    GENERAL:  Appears stated age, well-groomed, well-nourished, no distress  HEENT:  NC/AT,  conjunctivae clear and pink, no thyromegaly, nodules, adenopathy, no JVD, sclera -anicteric  CHEST:  Full & symmetric excursion, no increased effort, breath sounds clear  HEART:  Regular rhythm, S1, S2, no murmur/rub/S3/S4, no abdominal bruit, no edema  ABDOMEN:  Soft, non-tender, non-distended, normoactive bowel sounds,  no masses ,no hepato-splenomegaly, no signs of chronic liver disease  EXTEREMITIES:  no cyanosis,clubbing or edema  SKIN:  No rash/erythema/ecchymoses/petechiae/wounds/abscess/warm/dry  NEURO:  Alert, oriented, no asterixis, no tremor, no encephalopathy    LABS:                        8.3    6.52  )-----------( 188      ( 18 Dec 2020 06:18 )             25.9     12-18    137  |  105  |  90<H>  ----------------------------<  186<H>  4.4   |  15<L>  |  6.63<H>    Ca    8.4      18 Dec 2020 16:14  Phos  4.7     18  Mg     2.4     18    TPro  6.0  /  Alb  2.8<L>  /  TBili  0.1<L>  /  DBili  x   /  AST  43<H>  /  ALT  61<H>  /  AlkPhos  252<H>  1218    LIVER FUNCTIONS - ( 18 Dec 2020 06:17 )  Alb: 2.8 g/dL / Pro: 6.0 g/dL / ALK PHOS: 252 U/L / ALT: 61 U/L / AST: 43 U/L / GGT: x           PT/INR - ( 18 Dec 2020 06:18 )   PT: 14.3 sec;   INR: 1.20 ratio         PTT - ( 18 Dec 2020 06:18 )  PTT:27.5 sec  Urinalysis Basic - ( 17 Dec 2020 16:54 )    Color: Light Yellow / Appearance: Slightly Turbid / S.012 / pH: x  Gluc: x / Ketone: Negative  / Bili: Negative / Urobili: Negative   Blood: x / Protein: 100 mg/dL / Nitrite: Negative   Leuk Esterase: Large / RBC: 3 /hpf /  /HPF   Sq Epi: x / Non Sq Epi: 0 /hpf / Bacteria: Negative      Amylase Serum--      Lipase serum--       [id number]      Imaging:

## 2020-12-18 NOTE — CONSULT NOTE ADULT - ASSESSMENT
JESSICA MARTIN is a 65y Male with PMHx of IDDM, HLD, obesity, HFpEF with mild LV diastolic dysfunction, MGUS, chronic anemia with a history of duodenal ulcer as well as GAVE and duodenal AVM s/p APC (last on 10/11/19), decompensated JEAN/Cirrhosis 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. Recent admission from 11/20-12/2 for OM of the R heel, s/p debridement with Podiatry, and was discharged with Cefepime x 6 weeks via PICC line (placed 12/1). Sent to ED from clinic with rising transaminitis and MISA (SCr 3.3 from 1.5). Hospital course complicated by C diff colitis. MISA 2/2 recent cefepime use (for OM) vs dehydration 2/2 c diff. SICU consulted in the setting of elevated creatinine to 6.6 and increased lethargy likely in the setting of uremic encephalopathy. Transferred to SICU for hemodynamic and mental status monitoring with plan for possible HD catheter placement and initiation of HD. Currently no emergent indications for HD.    PLAN:  Neuro: uremic encephalopathy  - Monitor mental status, currently lethargic  - Currently not in pain, no pain meds ordered.  - If pain develops, avoid narcotics due to AMS    Resp: no acute issues  - Monitor pulse oximeter, saturating well on room air  - Out of bed to chair, ambulate as tolerated, and incentive spirometry to prevent atelectasis  - Currently protecting airway    CV: HFpEF with mild LV diastolic dysfunction  - Monitor vital signs, currently stable    GI: duodenal ulcer, GAVE, duodenal AVM s/p APC (last on 10/11/19), decompensated JEAN/Cirrhosis s/p OLT   - Consistent carb diet  - Home protonix    Renal: MISA (baseline Cr 1.5)  - Monitor I&Os  - Monitor electrolytes in setting of MISA  - Bicarb gtt (75mEq) @ 75cc/hr for decreased bicarb in setting of MISA  - Currently no emergent indications for HD  - Consider HD catheter placement and initiation of HD as per transplant team    Heme: chronic anemia, MGUS  - Monitor CBC and coags  - HSq for VTE prophylaxis  - Home ASA    ID: immunosuppression in setting of recent OLT  - Monitor for clinical evidence of active infection  - Immunosuppression with everolimus and prednisone  - Meropenem    Endo: hx DM, HLD  - Monitor glucose     Disposition:  - Full code  - SICU    OLY Holley-C     [id number] JESSICA MARTIN is a 65y Male with PMHx of IDDM, HLD, obesity, HFpEF with mild LV diastolic dysfunction, MGUS, chronic anemia with a history of duodenal ulcer as well as GAVE and duodenal AVM s/p APC (last on 10/11/19), decompensated JEAN/Cirrhosis 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. Recent admission from 11/20-12/2 for OM of the R heel, s/p debridement with Podiatry, and was discharged with Cefepime x 6 weeks via PICC line (placed 12/1). Sent to ED from clinic with rising transaminitis and MISA (SCr 3.3 from 1.5). Hospital course complicated by C diff colitis. MISA 2/2 recent cefepime use (for OM) vs dehydration 2/2 c diff. SICU consulted in the setting of elevated creatinine to 6.6 and increased lethargy likely in the setting of uremic encephalopathy. Transferred to SICU for hemodynamic and mental status monitoring with plan for possible HD catheter placement and initiation of HD. Currently no emergent indications for HD.    PLAN:  Neuro: uremic encephalopathy  - Monitor mental status, currently lethargic  - Currently not in pain, no pain meds ordered.  - If pain develops, avoid narcotics due to AMS    Resp: no acute issues  - Monitor pulse oximeter, saturating well on room air  - Out of bed to chair, ambulate as tolerated, and incentive spirometry to prevent atelectasis  - Currently protecting airway    CV: HFpEF with mild LV diastolic dysfunction  - Monitor vital signs, currently stable    GI: duodenal ulcer, GAVE, duodenal AVM s/p APC (last on 10/11/19), decompensated JEAN/Cirrhosis s/p OLT   - Consistent carb diet  - Home protonix    Renal: MISA (baseline Cr 1.5)  - Monitor I&Os  - Monitor electrolytes in setting of MISA  - Bicarb gtt (75mEq) @ 75cc/hr for decreased bicarb in setting of MISA  - Currently no emergent indications for HD  - Consider HD catheter placement and initiation of HD as per transplant team    Heme: chronic anemia, MGUS  - Monitor CBC and coags  - HSq for VTE prophylaxis  - Home ASA    ID: immunosuppression in setting of recent OLT, C diff colitis, abnormal UA  - Monitor for clinical evidence of active infection  - Immunosuppression with everolimus and prednisone  - Meropenem for abnormal UA in setting of nephrolithiasis  - Valcyte and nystatin for ppx in setting of immunosuppression  - PO vancomycin for C diff    Endo: hx DM, HLD  - Monitor glucose before meals and at bedtime with ISS  - Lantus 10 units at bedtime    Disposition:  - Full code  - SICU    NANCY HolleyC     [phone]

## 2020-12-18 NOTE — PROGRESS NOTE ADULT - ASSESSMENT
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, CNI Neurotoxicity), IDDM, hyperlipidemia, obesity, HFpEF with mild LV diastolic dysfunction, MGUS who presented with fevers, diarrhea and abnormal labwork    Of note, recent admission for R Heel Osteomyelitis   s/p right foot heel incision and drainage w/ application of stravix and bone biopsy on 11/24  Superficial cultures with Pseudomonas and Operative Cultures with Coag Neg Staph in fluid media  Discharged on IV Cefepime with plan for 6 week course    On 12/14 labwork was noted to have MISA and Transaminitis  Of note [facility] outpatient labs from 12/9 with Cr of 1.41 and LFT's WNL    U/A here with pyuria, UCx with NGTD  COVID19 PCR Negative  C diff toxin assay indeterminate with Positive C diff PCR  CXR Clear   CT A/P with distal left ureteral calculus without hydronephrosis and Diffuse wall thickening, slightly asymmetric on the left but without prostate abscess.   Renal US with left collecting system fullness but no hydronephrosis    The patient's heel appears without cellulitis or drainage so I doubt that this is the source of his fever.   Given the negative UCx believe we can monitor off of Daptomycin (No VRE growth)    Would continue Meropenem at this point (given prior ESBL E coli recurrent UTI)  Would continue PO Vancomycin  Given the negative UCx believe we can monitor off of Daptomycin (No VRE growth)    Question of AIN raised - urine eosinophils were positive      Overall, C diff infection, Fever, Transaminitis, MISA, Abnormal Urinalysis, Nephrolithiasis, Heel Osteomyelitis, Liver Transplant Recipient    # Acute kidney injury / Abnormal Urinalysis / Nephrolithiasis   - CT abdomen and pelvis showing 5 mm stone   - Renal US with left collecting system fullness but no hydronephrosis  - Urine Eosinophils positive (?AIN)  - Hold bactrim  - UCx with NGTD  - Decrease meropenem to 500 mg IV q24 hours    # Fever / Transaminitis  - Daptomycin discontinued  - Decrease meropenem to 500 mg IV q24 hours  - Follow up on prelim BCx  - Follow PICC line tip culture  - CMV PCR pending     # C. diff infection  - Continue oral vancomycin 125 mg PO q6 hours    # RLE Heel OM  - Decrease meropenem to 500 mg IV q24 hours  - Monitor fever curve  - Trend CBC daily  - ID will continue to follow    #Liver Transplant Recipient  - Decrease Valcyte to Twice weekly given drop in renal function  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, CNI Neurotoxicity), IDDM, hyperlipidemia, obesity, HFpEF with mild LV diastolic dysfunction, MGUS who presented with fevers, diarrhea and abnormal labwork    Of note, recent admission for R Heel Osteomyelitis   s/p right foot heel incision and drainage w/ application of stravix and bone biopsy on 11/24  Superficial cultures with Pseudomonas and Operative Cultures with Coag Neg Staph in fluid media  Discharged on IV Cefepime with plan for 6 week course    On 12/14 labwork was noted to have MISA and Transaminitis  Of note [facility] outpatient labs from 12/9 with Cr of 1.41 and LFT's WNL    U/A here with pyuria, UCx with NGTD  COVID19 PCR Negative  C diff toxin assay indeterminate with Positive C diff PCR  CXR Clear   CT A/P with distal left ureteral calculus without hydronephrosis and Diffuse wall thickening, slightly asymmetric on the left but without prostate abscess.   Renal US with left collecting system fullness but no hydronephrosis    The patient's heel appears without cellulitis or drainage so I doubt that this is the source of his fever.   Given the negative UCx believe we can monitor off of Daptomycin (No VRE growth)    Would continue Meropenem at this point (given prior ESBL E coli recurrent UTI)  Would continue PO Vancomycin  Given the negative UCx believe we can monitor off of Daptomycin (No VRE growth)    Question of AIN raised - urine eosinophils were positive      Overall, C diff infection, Fever, Transaminitis, MISA, Abnormal Urinalysis, Nephrolithiasis, Heel Osteomyelitis, Liver Transplant Recipient    # Acute kidney injury / Abnormal Urinalysis / Nephrolithiasis   - CT abdomen and pelvis showing 5 mm stone   - Renal US with left collecting system fullness but no hydronephrosis  - Urine Eosinophils positive (?AIN)  - Hold bactrim  - UCx with NGTD  - Decrease meropenem to 500 mg IV q24 hours    # Fever / Transaminitis  - Daptomycin discontinued  - Decrease meropenem to 500 mg IV q24 hours  - Follow up on prelim BCx  - Follow PICC line tip culture  - CMV PCR pending     # C. diff infection  - Continue oral vancomycin 125 mg PO q6 hours    # RLE Heel OM  - Decrease meropenem to 500 mg IV q24 hours  - Monitor fever curve  - Trend CBC daily  - ID will continue to follow    #Liver Transplant Recipient  - Decrease Valcyte to Twice weekly given drop in renal function     I will continue to follow. Please feel free to contact me with any further questions.    Eusebio Pérez M.D.  [facility] Division of Infectious Disease  8AM-5PM: Pager Number [phone]  After Hours (or if no response): Please contact the Infectious Diseases Office at [phone]     The above assessment and plan were discussed with Dr Chris and Transplant Surgery Team

## 2020-12-18 NOTE — PROGRESS NOTE ADULT - ATTENDING COMMENTS
remains with low grade fever  source unclear; UTI? , c diff  liver functions now trending down  creatinine still rising; ATN? sepsis? interstitial nephritis?  now on meropenam,   immunosuppression everolimus and steroids no fevers for last 24hrs.  CDiff resolving (no diarrhea) on PO vanco.  On Lachelle  Significant MISA -- AIN vs ATN, will continue to monitor.  no indication for HD now.  should plateau and normalize.  will avoid higher doses of steroids out of concern for infection  Immuno: everolimus, steroids

## 2020-12-18 NOTE — CONSULT NOTE ADULT - SUBJECTIVE AND OBJECTIVE BOX
HISTORY OF PRESENT ILLNESS:  JESSICA MARTIN is a 65y Male with PMHx of IDDM, HLD, obesity, HFpEF with mild LV diastolic dysfunction, MGUS, chronic anemia with a history of duodenal ulcer 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  Underwent OLT on 7/2/2020, post op course c/b VRE bacteremia tx with linezolid and delirium likely in setting of CNI toxicity (FK discontinued/Everolimus initiated 7/27).    Recent admission from 11/20-12/2 for OM of the R heel, s/p debridement with Podiatry, and was discharged with Cefepime x 6 weeks via PICC line (placed 12/1). Initial wound cx on admission grew Pseudomonas, OR cx grew staph epi. Was also found with neutropenia and Valcyte was reduced (was on 900mg daily CMV +/-).      Sent to ED from clinic with rising transaminitis and MISA (SCr 3.3 from 1.5). Hospital course complicated by C diff colitis. MISA 2/2 recent cefepime use (for OM) vs dehydration 2/2 c diff.    SICU consulted in the setting of elevated creatinine to 6.6 and increased lethargy likely in the setting of uremic encephalopathy. Transferred to SICU for hemodynamic and mental status monitoring with plan for possible HD catheter placement and initiation of HD. Currently no emergent indications for HD.      PAST MEDICAL HISTORY:  GIB (gastrointestinal bleeding)  GERD with esophagitis  Hepatic encephalopathy  Obesity  Fatty liver disease, nonalcoholic  Renal stones  Hypertension  Neuropathy  Hypercholesteremia  Diabetes  GAVE syndrome  HFpEF      PAST SURGICAL HISTORY:  S/P cholecystectomy  s/p liver transplant      FAMILY HISTORY:  Family history of type 2 diabetes mellitus  Family history of hypertension  Family history of stomach cancer      CODE STATUS: full code      HOME MEDICATIONS:  · 	sulfamethoxazole-trimethoprim 400 mg-80 mg oral tablet: 1 tab(s) orally once a day  · 	pantoprazole 40 mg oral delayed release tablet: 1 tab(s) orally once a day (before a meal)  · 	magnesium oxide 400 mg (241.3 mg elemental magnesium) oral tablet: 2 tab(s) orally once a day (before a meal)  · 	everolimus 1 mg oral tablet: 2 tab(s) orally   · 	furosemide 20 mg oral tablet: 1 tab(s) orally once a day  · 	valGANciclovir 450 mg oral tablet: 1 tab(s) orally once a day  · 	aspirin 81 mg oral delayed release tablet: 1 tab(s) orally once a day  · 	insulin glargine: 10 unit(s) subcutaneous once a day (at bedtime)  · 	tamsulosin 0.4 mg oral capsule: 1 cap(s) orally once a day (at bedtime)  · 	predniSONE 5 mg oral tablet: 1 tab(s) orally once a day  · 	cefepime 2 g intravenous injection: 2 gram(s) intravenous every 8 hours  · 	nystatin 100,000 units/mL oral suspension: 5 milliliter(s) orally 4 times a day  · 	HumaLOG KwikPen 100 units/mL injectable solution: 10 unit(s) injectable 3 times a day (before meals)      ALLERGIES:  codeine (Anaphylaxis)      VITAL SIGNS:  ICU Vital Signs Last 24 Hrs  T(C): 36.9 (18 Dec 2020 17:33), Max: 36.9 (18 Dec 2020 01:00)  T(F): 98.4 (18 Dec 2020 17:33), Max: 98.5 (18 Dec 2020 01:00)  HR: 77 (18 Dec 2020 17:33) (75 - 87)  BP: 151/75 (18 Dec 2020 17:33) (131/68 - 153/76)  BP(mean): 93 (18 Dec 2020 01:00) (93 - 93)  RR: 17 (18 Dec 2020 17:33) (16 - 18)  SpO2: 96% (18 Dec 2020 17:33) (95% - 98%)      NEURO  Exam: AOx4, following commands, lethargic  Meds:      RESPIRATORY  Exam: unlabored respirations, protecting airway, saturating well on room air  Meds:      CARDIOVASCULAR  Exam:  Cardiac Rhythm:  Meds:tamsulosin 0.4 milliGRAM(s) Oral at bedtime      GI/NUTRITION  Exam:  Diet:  Meds:pantoprazole    Tablet 40 milliGRAM(s) Oral before breakfast      GENITOURINARY/RENAL  Meds:dextrose 5%. 1000 milliLiter(s) IV Continuous <Continuous>  dextrose 5%. 1000 milliLiter(s) IV Continuous <Continuous>  magnesium oxide 400 milliGRAM(s) Oral daily  sodium bicarbonate 1300 milliGRAM(s) Oral three times a day  sodium bicarbonate  Infusion 0.05 mEq/kG/Hr IV Continuous <Continuous>    12-17 @ 07:01  -  12-18 @ 07:00  --------------------------------------------------------  IN:    Oral Fluid: 420 mL  Total IN: 420 mL    OUT:    Voided (mL): 1355 mL  Total OUT: 1355 mL    Total NET: -935 mL    12-18 @ 07:01  -  12-18 @ 20:01  --------------------------------------------------------  IN:  Total IN: 0 mL    OUT:    Voided (mL): 400 mL  Total OUT: 400 mL    Total NET: -400 mL    12-18    137  |  105  |  90<H>  ----------------------------<  186<H>  4.4   |  15<L>  |  6.63<H>    Ca    8.4      18 Dec 2020 16:14  Phos  4.7     12-18  Mg     2.4     12-18    TPro  6.0  /  Alb  2.8<L>  /  TBili  0.1<L>  /  DBili  x   /  AST  43<H>  /  ALT  61<H>  /  AlkPhos  252<H>  12-18    [ ] Beasley catheter, indication: none      HEMATOLOGIC  [x] VTE Prophylaxis:  aspirin  chewable 81 milliGRAM(s) Oral daily  heparin   Injectable 5000 Unit(s) SubCutaneous every 12 hours                        8.3    6.52  )-----------( 188      ( 18 Dec 2020 06:18 )             25.9     PT/INR - ( 18 Dec 2020 06:18 )   PT: 14.3 sec;   INR: 1.20 ratio    PTT - ( 18 Dec 2020 06:18 )  PTT:27.5 sec  Transfusion: [ ] PRBC	[ ] Platelets	[ ] FFP	[ ] Cryoprecipitate      INFECTIOUS DISEASES  Meds:everolimus (ZORTRESS) 1.5 milliGRAM(s) Oral <User Schedule>  meropenem  IVPB 500 milliGRAM(s) IV Intermittent every 24 hours  nystatin    Suspension 698594 Unit(s) Oral four times a day  valGANciclovir 450 milliGRAM(s) Oral <User Schedule>  vancomycin    Solution 125 milliGRAM(s) Oral every 6 hours    RECENT CULTURES:  Specimen Source: .Urine Catheterized  Date/Time: 12-17 @ 20:08  Culture Results:   No growth  Gram Stain: --  Organism: --  Specimen Source: .Catheter PICC line tip  Date/Time: 12-15 @ 16:43  Culture Results:   < 15 colonies Coag Negative Staphylococcus  Gram Stain: --  Organism: --  Specimen Source: .Urine Clean Catch (Midstream)  Date/Time: 12-15 @ 16:28  Culture Results:   <10,000 CFU/mL Normal Urogenital Shantel  Gram Stain: --  Organism: --  Specimen Source: .Blood PICC/PERC Single Lumen  Date/Time: 12-14 @ 23:13  Culture Results:   No growth to date.  Gram Stain: --  Organism: --      ENDOCRINE  Meds:dextrose 40% Gel 15 Gram(s) Oral once  dextrose 50% Injectable 25 Gram(s) IV Push once  dextrose 50% Injectable 12.5 Gram(s) IV Push once  dextrose 50% Injectable 25 Gram(s) IV Push once  glucagon  Injectable 1 milliGRAM(s) IntraMuscular once  insulin glargine Injectable (LANTUS) 10 Unit(s) SubCutaneous at bedtime  insulin lispro (ADMELOG) corrective regimen sliding scale   SubCutaneous three times a day before meals  insulin lispro (ADMELOG) corrective regimen sliding scale   SubCutaneous at bedtime  predniSONE   Tablet 5 milliGRAM(s) Oral two times a day    CAPILLARY BLOOD GLUCOSE  POCT Blood Glucose.: 189 mg/dL (18 Dec 2020 17:32)  POCT Blood Glucose.: 190 mg/dL (18 Dec 2020 14:25)  POCT Blood Glucose.: 213 mg/dL (18 Dec 2020 10:35)  POCT Blood Glucose.: 218 mg/dL (17 Dec 2020 22:17)      PATIENT CARE ACCESS DEVICES:  [x] Peripheral IV  [ ] Central Venous Line	[ ] R	[ ] L	[ ] IJ	[ ] Fem	[ ] SC	Placed:   [ ] Arterial Line		[ ] R	[ ] L	[ ] Fem	[ ] Rad	[ ] Ax	Placed:   [ ] PICC:					[ ] Mediport  [ ] Urinary Catheter, Date Placed:   [x] Necessity of urinary, arterial, and venous catheters discussed    OTHER MEDICATIONS: chlorhexidine 2% Cloths 1 Application(s) Topical daily  ofloxacin 0.3% Solution 10 Drop(s) Right Ear two times a day    IMAGING STUDIES: HISTORY OF PRESENT ILLNESS:  JESSICA MARTIN is a 65y Male with PMHx of IDDM, HLD, obesity, HFpEF with mild LV diastolic dysfunction, MGUS, chronic anemia with a history of duodenal ulcer 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  Underwent OLT on 7/2/2020, post op course c/b VRE bacteremia tx with linezolid and delirium likely in setting of CNI toxicity (FK discontinued/Everolimus initiated 7/27).    Recent admission from 11/20-12/2 for OM of the R heel, s/p debridement with Podiatry, and was discharged with Cefepime x 6 weeks via PICC line (placed 12/1). Initial wound cx on admission grew Pseudomonas, OR cx grew staph epi. Was also found with neutropenia and Valcyte was reduced (was on 900mg daily CMV +/-).      Sent to ED from clinic with rising transaminitis and MISA (SCr 3.3 from 1.5). Hospital course complicated by C diff colitis. MISA 2/2 recent cefepime use (for OM) vs dehydration 2/2 c diff.    SICU consulted in the setting of elevated creatinine to 6.6 and increased lethargy likely in the setting of uremic encephalopathy. Transferred to SICU for hemodynamic and mental status monitoring with plan for possible HD catheter placement and initiation of HD. Currently no emergent indications for HD.      PAST MEDICAL HISTORY:  GIB (gastrointestinal bleeding)  GERD with esophagitis  Hepatic encephalopathy  Obesity  Fatty liver disease, nonalcoholic  Renal stones  Hypertension  Neuropathy  Hypercholesteremia  Diabetes  GAVE syndrome  HFpEF      PAST SURGICAL HISTORY:  S/P cholecystectomy  s/p liver transplant      FAMILY HISTORY:  Family history of type 2 diabetes mellitus  Family history of hypertension  Family history of stomach cancer      CODE STATUS: full code      HOME MEDICATIONS:  · 	sulfamethoxazole-trimethoprim 400 mg-80 mg oral tablet: 1 tab(s) orally once a day  · 	pantoprazole 40 mg oral delayed release tablet: 1 tab(s) orally once a day (before a meal)  · 	magnesium oxide 400 mg (241.3 mg elemental magnesium) oral tablet: 2 tab(s) orally once a day (before a meal)  · 	everolimus 1 mg oral tablet: 2 tab(s) orally   · 	furosemide 20 mg oral tablet: 1 tab(s) orally once a day  · 	valGANciclovir 450 mg oral tablet: 1 tab(s) orally once a day  · 	aspirin 81 mg oral delayed release tablet: 1 tab(s) orally once a day  · 	insulin glargine: 10 unit(s) subcutaneous once a day (at bedtime)  · 	tamsulosin 0.4 mg oral capsule: 1 cap(s) orally once a day (at bedtime)  · 	predniSONE 5 mg oral tablet: 1 tab(s) orally once a day  · 	cefepime 2 g intravenous injection: 2 gram(s) intravenous every 8 hours  · 	nystatin 100,000 units/mL oral suspension: 5 milliliter(s) orally 4 times a day  · 	HumaLOG KwikPen 100 units/mL injectable solution: 10 unit(s) injectable 3 times a day (before meals)      ALLERGIES:  codeine (Anaphylaxis)      VITAL SIGNS:  ICU Vital Signs Last 24 Hrs  T(C): 36.9 (18 Dec 2020 17:33), Max: 36.9 (18 Dec 2020 01:00)  T(F): 98.4 (18 Dec 2020 17:33), Max: 98.5 (18 Dec 2020 01:00)  HR: 77 (18 Dec 2020 17:33) (75 - 87)  BP: 151/75 (18 Dec 2020 17:33) (131/68 - 153/76)  BP(mean): 93 (18 Dec 2020 01:00) (93 - 93)  RR: 17 (18 Dec 2020 17:33) (16 - 18)  SpO2: 96% (18 Dec 2020 17:33) (95% - 98%)      NEURO  Exam: AOx4, following commands, lethargic  Meds:      RESPIRATORY  Exam: unlabored respirations, protecting airway, saturating well on room air  Meds:      CARDIOVASCULAR  Exam: RRR  Cardiac Rhythm: normal sinus  Meds:tamsulosin 0.4 milliGRAM(s) Oral at bedtime      GI/NUTRITION  Exam: soft, nontender, nondistended  Diet:  Meds:pantoprazole    Tablet 40 milliGRAM(s) Oral before breakfast      GENITOURINARY/RENAL  Meds:dextrose 5%. 1000 milliLiter(s) IV Continuous <Continuous>  dextrose 5%. 1000 milliLiter(s) IV Continuous <Continuous>  magnesium oxide 400 milliGRAM(s) Oral daily  sodium bicarbonate 1300 milliGRAM(s) Oral three times a day  sodium bicarbonate  Infusion 0.05 mEq/kG/Hr IV Continuous <Continuous>    12-17 @ 07:01  -  12-18 @ 07:00  --------------------------------------------------------  IN:    Oral Fluid: 420 mL  Total IN: 420 mL    OUT:    Voided (mL): 1355 mL  Total OUT: 1355 mL    Total NET: -935 mL    12-18 @ 07:01  -  12-18 @ 20:01  --------------------------------------------------------  IN:  Total IN: 0 mL    OUT:    Voided (mL): 400 mL  Total OUT: 400 mL    Total NET: -400 mL    12-18    137  |  105  |  90<H>  ----------------------------<  186<H>  4.4   |  15<L>  |  6.63<H>    Ca    8.4      18 Dec 2020 16:14  Phos  4.7     12-18  Mg     2.4     12-18    TPro  6.0  /  Alb  2.8<L>  /  TBili  0.1<L>  /  DBili  x   /  AST  43<H>  /  ALT  61<H>  /  AlkPhos  252<H>  12-18    [ ] Beasley catheter, indication: none      HEMATOLOGIC  [x] VTE Prophylaxis:  aspirin  chewable 81 milliGRAM(s) Oral daily  heparin   Injectable 5000 Unit(s) SubCutaneous every 12 hours                        8.3    6.52  )-----------( 188      ( 18 Dec 2020 06:18 )             25.9     PT/INR - ( 18 Dec 2020 06:18 )   PT: 14.3 sec;   INR: 1.20 ratio    PTT - ( 18 Dec 2020 06:18 )  PTT:27.5 sec  Transfusion: [ ] PRBC	[ ] Platelets	[ ] FFP	[ ] Cryoprecipitate      INFECTIOUS DISEASES  Meds:everolimus (ZORTRESS) 1.5 milliGRAM(s) Oral <User Schedule>  meropenem  IVPB 500 milliGRAM(s) IV Intermittent every 24 hours  nystatin    Suspension 327235 Unit(s) Oral four times a day  valGANciclovir 450 milliGRAM(s) Oral <User Schedule>  vancomycin    Solution 125 milliGRAM(s) Oral every 6 hours    RECENT CULTURES:  Specimen Source: .Urine Catheterized  Date/Time: 12-17 @ 20:08  Culture Results:   No growth  Gram Stain: --  Organism: --  Specimen Source: .Catheter PICC line tip  Date/Time: 12-15 @ 16:43  Culture Results:   < 15 colonies Coag Negative Staphylococcus  Gram Stain: --  Organism: --  Specimen Source: .Urine Clean Catch (Midstream)  Date/Time: 12-15 @ 16:28  Culture Results:   <10,000 CFU/mL Normal Urogenital Shantel  Gram Stain: --  Organism: --  Specimen Source: .Blood PICC/PERC Single Lumen  Date/Time: 12-14 @ 23:13  Culture Results:   No growth to date.  Gram Stain: --  Organism: --      ENDOCRINE  Meds:dextrose 40% Gel 15 Gram(s) Oral once  dextrose 50% Injectable 25 Gram(s) IV Push once  dextrose 50% Injectable 12.5 Gram(s) IV Push once  dextrose 50% Injectable 25 Gram(s) IV Push once  glucagon  Injectable 1 milliGRAM(s) IntraMuscular once  insulin glargine Injectable (LANTUS) 10 Unit(s) SubCutaneous at bedtime  insulin lispro (ADMELOG) corrective regimen sliding scale   SubCutaneous three times a day before meals  insulin lispro (ADMELOG) corrective regimen sliding scale   SubCutaneous at bedtime  predniSONE   Tablet 5 milliGRAM(s) Oral two times a day    CAPILLARY BLOOD GLUCOSE  POCT Blood Glucose.: 189 mg/dL (18 Dec 2020 17:32)  POCT Blood Glucose.: 190 mg/dL (18 Dec 2020 14:25)  POCT Blood Glucose.: 213 mg/dL (18 Dec 2020 10:35)  POCT Blood Glucose.: 218 mg/dL (17 Dec 2020 22:17)      PATIENT CARE ACCESS DEVICES:  [x] Peripheral IV  [ ] Central Venous Line	[ ] R	[ ] L	[ ] IJ	[ ] Fem	[ ] SC	Placed:   [ ] Arterial Line		[ ] R	[ ] L	[ ] Fem	[ ] Rad	[ ] Ax	Placed:   [ ] PICC:					[ ] Mediport  [ ] Urinary Catheter, Date Placed:   [x] Necessity of urinary, arterial, and venous catheters discussed    OTHER MEDICATIONS: chlorhexidine 2% Cloths 1 Application(s) Topical daily  ofloxacin 0.3% Solution 10 Drop(s) Right Ear two times a day    IMAGING STUDIES:

## 2020-12-18 NOTE — PROGRESS NOTE ADULT - SUBJECTIVE AND OBJECTIVE BOX
Transplant Surgery - Multidisciplinary Rounds  --------------------------------------------------------------  s/p OLT 7/2/2020      Admitted 12/14/2020 with elevated LFTs, MISA, diarrhea    Present:  Patient seen with multidisciplinary team including Transplant Surgeon: Dr. Gray, Transplant hepatology Dr. Lee, Transplant Nephrologist: Dr. Rowland,  Pharmacist: Jessica Nagel, [facility]'s Oswald Nguyen, and Surgical Residents Tarik Joy during am rounds and examined with Dr. Gray. Disciplines not in attendance will be notified of the plan.      HPI: 65M with PMHx of IDDM, HLD, obesity, HFpEF with mild LV diastolic dysfunction, MGUS, chronic anemia with a history of duodenal ulcer 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  Underwent OLT on 7/2/2020, post op course c/b VRE bacteremia tx with linezolid and delirium likely in setting of CNI toxicity (FK discontinued/Everolimus initiated 7/27).    Recent admission from 11/20-12/2 for OM of the R heel, s/p debridement with Podiatry, and was discharged with Cefepime x 6 weeks via PICC line (placed 12/1). Initial wound cx on admission grew Pseudomonas, OR cx grew staph epi. Was also found with neutropenia and Valcyte was reduced (was on 900mg daily CMV +/-).      Sent to ED from clinic with rising transaminitis and MISA (SCr 3.3 from 1.5)    Interval Events:  - Tmax 38.3 12/17 at 5:00, afebrile last 24h  WBC 6.52   - On Meropenem.  Oral Vanco for C-diff  - CDiff PCR positive   - 12/14 BCx  ngtd   UCX pending    - LFTs stable ASt/ALt/Alk phos 43/61/252  - Cr uptrending to 6.60 from 6.06  - CT scan completed showing 5mm distal ureteral calculus, no hydro, no prostate abscess, diffuse bladder wall thickening, ultrasound showed no obstruction or stone but fullness of collecting system  - TTE: EF 61%.  No obvious endocarditis  - Liver US: patent portal veins, no tardus parvus, elevated velocities in main hepatic artery    Patient feels well, no abdominal pain, subjective fevers or chills. No nausea or vomiting.    ----> Transaminitis  AST 18-> 77-> 61 -> 60 ->43  ALT 26 -> 88 -> 81-> 80 ->67  Alk Phos 126 -> 169 -> 167 -> 196->221  INR 0.97 -> 1.26 -> 1.31 -> 1.33->1.18      Potential Discharge date: pending clinical improvement     Education:  Medications    Plan of care:  See Below         MEDICATIONS  (STANDING):  aspirin  chewable 81 milliGRAM(s) Oral daily  dextrose 40% Gel 15 Gram(s) Oral once  dextrose 5%. 1000 milliLiter(s) (50 mL/Hr) IV Continuous <Continuous>  dextrose 5%. 1000 milliLiter(s) (100 mL/Hr) IV Continuous <Continuous>  dextrose 50% Injectable 25 Gram(s) IV Push once  dextrose 50% Injectable 12.5 Gram(s) IV Push once  dextrose 50% Injectable 25 Gram(s) IV Push once  everolimus (ZORTRESS) 2 milliGRAM(s) Oral <User Schedule>  glucagon  Injectable 1 milliGRAM(s) IntraMuscular once  heparin   Injectable 5000 Unit(s) SubCutaneous every 12 hours  influenza   Vaccine 0.5 milliLiter(s) IntraMuscular once  insulin glargine Injectable (LANTUS) 10 Unit(s) SubCutaneous at bedtime  insulin lispro (ADMELOG) corrective regimen sliding scale   SubCutaneous three times a day before meals  insulin lispro (ADMELOG) corrective regimen sliding scale   SubCutaneous at bedtime  magnesium oxide 400 milliGRAM(s) Oral daily  meropenem  IVPB 500 milliGRAM(s) IV Intermittent every 12 hours  meropenem  IVPB      nystatin    Suspension [id number] Unit(s) Oral four times a day  pantoprazole    Tablet 40 milliGRAM(s) Oral before breakfast  predniSONE   Tablet 5 milliGRAM(s) Oral daily  sodium bicarbonate 1300 milliGRAM(s) Oral three times a day  tamsulosin 0.4 milliGRAM(s) Oral at bedtime  valGANciclovir 450 milliGRAM(s) Oral <User Schedule>  vancomycin    Solution 125 milliGRAM(s) Oral every 6 hours    MEDICATIONS  (PRN):      PAST MEDICAL & SURGICAL HISTORY:  GIB (gastrointestinal bleeding)    GERD with esophagitis  Gastritis &amp; Non Bleeding Ulcers    Hepatic encephalopathy    Obesity    Fatty liver disease, nonalcoholic    Renal stones  25 years ago    Hypertension    Neuropathy    Hypercholesteremia    Diabetes    S/P cholecystectomy        MEDICATIONS  (STANDING):  aspirin  chewable 81 milliGRAM(s) Oral daily  chlorhexidine 2% Cloths 1 Application(s) Topical daily  dextrose 40% Gel 15 Gram(s) Oral once  dextrose 5%. 1000 milliLiter(s) (50 mL/Hr) IV Continuous <Continuous>  dextrose 5%. 1000 milliLiter(s) (100 mL/Hr) IV Continuous <Continuous>  dextrose 50% Injectable 25 Gram(s) IV Push once  dextrose 50% Injectable 12.5 Gram(s) IV Push once  dextrose 50% Injectable 25 Gram(s) IV Push once  everolimus (ZORTRESS) 1.5 milliGRAM(s) Oral <User Schedule>  glucagon  Injectable 1 milliGRAM(s) IntraMuscular once  heparin   Injectable 5000 Unit(s) SubCutaneous every 12 hours  insulin glargine Injectable (LANTUS) 10 Unit(s) SubCutaneous at bedtime  insulin lispro (ADMELOG) corrective regimen sliding scale   SubCutaneous three times a day before meals  insulin lispro (ADMELOG) corrective regimen sliding scale   SubCutaneous at bedtime  magnesium oxide 400 milliGRAM(s) Oral daily  meropenem  IVPB 500 milliGRAM(s) IV Intermittent every 12 hours  meropenem  IVPB      nystatin    Suspension [id number] Unit(s) Oral four times a day  pantoprazole    Tablet 40 milliGRAM(s) Oral before breakfast  predniSONE   Tablet 5 milliGRAM(s) Oral two times a day  sodium bicarbonate 1300 milliGRAM(s) Oral three times a day  tamsulosin 0.4 milliGRAM(s) Oral at bedtime  valGANciclovir 450 milliGRAM(s) Oral <User Schedule>  vancomycin    Solution 125 milliGRAM(s) Oral every 6 hours    MEDICATIONS  (PRN):      PAST MEDICAL & SURGICAL HISTORY:  GIB (gastrointestinal bleeding)    GERD with esophagitis  Gastritis &amp; Non Bleeding Ulcers    Hepatic encephalopathy    Obesity    Fatty liver disease, nonalcoholic    Renal stones  25 years ago    Hypertension    Neuropathy    Hypercholesteremia    Diabetes    S/P cholecystectomy        Vital Signs Last 24 Hrs  T(C): 36.7 (18 Dec 2020 05:46), Max: 37.5 (17 Dec 2020 09:04)  T(F): 98 (18 Dec 2020 05:46), Max: 99.5 (17 Dec 2020 09:04)  HR: 82 (18 Dec 2020 05:46) (78 - 87)  BP: 149/77 (18 Dec 2020 05:46) (125/57 - 149/77)  BP(mean): 93 (18 Dec 2020 01:00) (93 - 93)  RR: 18 (18 Dec 2020 05:46) (18 - 18)  SpO2: 95% (18 Dec 2020 05:46) (94% - 97%)    I&O's Summary    17 Dec 2020 07:01  -  18 Dec 2020 07:00  --------------------------------------------------------  IN: 420 mL / OUT: 1355 mL / NET: -935 mL                              8.3    6.52  )-----------( 188      ( 18 Dec 2020 06:18 )             25.9     12-18    136  |  104  |  88<H>  ----------------------------<  212<H>  4.6   |  15<L>  |  6.60<H>    Ca    8.7      18 Dec 2020 06:17  Phos  4.7     12-18  Mg     2.4     12-18    TPro  6.0  /  Alb  2.8<L>  /  TBili  0.1<L>  /  DBili  x   /  AST  43<H>  /  ALT  61<H>  /  AlkPhos  252<H>  12-18          Culture - Catheter (collected 12-15-20 @ 16:43)  Source: .Catheter PICC line tip  Final Report (12-17-20 @ 18:03):    < 15 colonies Coag Negative Staphylococcus    Culture - Urine (collected 12-15-20 @ 16:28)  Source: .Urine Clean Catch (Midstream)  Final Report (12-16-20 @ 12:12):    <10,000 CFU/mL Normal Urogenital Shantel    Culture - Blood (collected 12-14-20 @ 23:13)  Source: .Blood Blood-Peripheral  Preliminary Report (12-16-20 @ 01:02):    No growth to date.    Culture - Blood (collected 12-14-20 @ 23:13)  Source: .Blood Blood-Peripheral  Preliminary Report (12-16-20 @ 01:02):    No growth to date.    Culture - Blood (collected 12-14-20 @ 23:13)  Source: .Blood PICC/PERC Single Lumen  Preliminary Report (12-16-20 @ 01:02):    No growth to date.                  EXAM:  CT ABDOMEN AND PELVIS    PROCEDURE DATE:  12/15/2020    INTERPRETATION:  CLINICAL INFORMATION: Post liver transplant with rising LFTs, febrile and positive urinalysis. Evaluate for prostatic abscess.  COMPARISON: Chest abdomen pelvis 7/11/2020  PROCEDURE:  CT of the Abdomen and Pelvis was performed without intravenous contrast.  Intravenous contrast: None.  Oral contrast: None.  Sagittal and coronal reformats were performed.    FINDINGS:  LOWER CHEST: Coronary calcification.    LIVER: Hepatic transplant.  BILE DUCTS: Normal caliber.  GALLBLADDER: Cholecystectomy.  SPLEEN: Within normal limits.  PANCREAS: Within normal limits.  ADRENALS: Within normal limits.  KIDNEYS/URETERS: A 5 mm distal left ureteral calculus without associated hydronephrosis.    BLADDER: Diffuse wall thickening, slightly asymmetric on the left may be related to urinary tract infection. Consider follow-up cystoscopy.  REPRODUCTIVE ORGANS: Prostate is normal in size. No evidence of a prostatic abscess as questioned.    BOWEL: No bowel obstruction.  PERITONEUM: Small volume ascites.  VESSELS: Atherosclerotic changes.  RETROPERITONEUM/LYMPH NODES: No lymphadenopathy.  ABDOMINAL WALL: Within normal limits.  BONES: Degenerative changes.    IMPRESSION:  A 5 mm distal left ureteral calculus. No associated hydronephrosis.    Diffuse wall thickening, slightly asymmetric on the left may be related to urinary tract infection. Consider follow-up cystoscopy.    No evidence of a prostatic abscess as questioned.    Findings were discussed with Dr. Lucio 12/15/2020 2:27 PM by Dr. Parks with read back confirmation.        EXAM:  US DPLX ABDOMEN    PROCEDURE DATE:  12/15/2020    INTERPRETATION:  CLINICAL INFORMATION: Status post liver transplant 7/2/2020. Elevated LFTs. Evaluate for portal vein thrombosis.  COMPARISON: CT abdomen/pelvis 12/15/2020.  TECHNIQUE: Color and spectral Doppler evaluation of the hepatic vasculature.  FINDINGS:  Homogeneous echogenicity of the liver transplant. No intrahepatic biliary ductal dilatation. Trace perihepatic fluid.    Main Portal Vein: Hepatopetal flow, 63 cm/s. 1.0 cm maximum diameter.  Left Portal Vein: Patent with hepatopetal flow.  Anterior Right Portal Vein: Patent with hepatopetal flow.  Posterior Right Portal Vein: Patent with hepatopetal flow.    Main Hepatic Artery: Normal direction of flow, peak systolic velocity 205-324 cm/s. RI 0.71.  Right anterior hepatic artery: Peak systolic velocity 63 cm/s. RI 0.50  Right posterior hepatic artery: Peak systolic velocity 173 cm/s. RI 0.60  Left hepatic artery: Peak systolic velocity 70 cm/s. RI 0.56.  No tardus parvus waveforms.    Hepatic Veins and Inferior Vena Cava: Patent with normal direction of flow.    Trace ascites.    IMPRESSION: Status post liver transplant. Elevated velocities in the main hepatic artery, increased from the prior ultrasound examination. No distal tardus parvus waveforms. Close interval follow-up is recommended. Patent portal veins.    PROCEDURE: Transthoracic echocardiogram with 2-D, M-Mode  and complete spectral and color flow Doppler.  INDICATION: Cardiac murmur, unspecified (R01.1)  ------------------------------------------------------------------------  Dimensions:    Normal Values:  LA:     4.7    2.0 - 4.0 cm  Ao:     3.1    2.0 - 3.8 cm  SEPTUM: 0.7    0.6 - 1.2 cm  PWT:    0.8    0.6 - 1.1 cm  LVIDd:  5.5    3.0 - 5.6 cm  LVIDs:  3.7    1.8 - 4.0 cm  Derived variables:  LVMI: 63 g/m2  RWT: 0.29  Fractional short: 33 %  EF (Visual Estimate): 60-65 %  EF (Teicholtz): 61 %  Doppler Peak Velocity (m/sec): AoV=2.4  ------------------------------------------------------------------------  Conclusions:  1. Mitral annular calcification and calcified mitral  leaflets with normal diastolic opening.  2. Calcified trileaflet aortic valve with decreased  opening. Peak transaortic valve gradient equals 23 mm Hg,  mean transaortic valve gradient equals 13 mm Hg, estimated  aortic valve area equals 1.6 sqcm (by continuity equation),  aortic valve velocity time integral equals 47 cm,  consistent with mild aortic stenosis.  3. Normal left ventricular internal dimensions and wall  thicknesses.  4. Normal left ventricular systolic function. No segmental  wall motion abnormalities.  5. Estimated pulmonary artery systolic pressure equals 37  mm Hg, assuming right atrial pressure equals 6 - 10 mm Hg,  consistent with borderline pulmonary pressures.  6. No obvious evidence of endocarditis upon review of  suboptimal images. Consider additional imaging/modalities  if clinically indicated weth a high suspcion.  *** Compared with echocardiogram of 7/15/2020, no  significant changes.  ---------------------------------------      Review of systems  Gen: tired  Skin:  R heel wound   Head/Eyes/Ears/Mouth: No headache; Normal hearing; Normal vision w/o blurriness; No sinus pain/discomfort, sore throat  Respiratory: No dyspnea, cough, wheezing, hemoptysis  CV: No chest pain, PND, orthopnea  GI:  denies abdominal pain, diarrhea, constipation, nausea, vomiting, melena, hematochezia  : No increased frequency, dysuria, hematuria, nocturia  MSK: No joint pain/swelling; no back pain; no edema  Neuro: No dizziness/lightheadedness, weakness, seizures, numbness, tingling  Heme: No easy bruising or bleeding  Endo: No heat/cold intolerance  Psych: No significant nervousness, anxiety, stress, depression  All other systems were reviewed and are negative, except as noted.      PHYSICAL EXAM:  Constitutional: Well developed / well nourished  Eyes: Anicteric, PERRLA  ENMT: nc/at  Respiratory: CTA B/L  Cardiovascular: RRR, + heart murmur  Gastrointestinal: Soft, non distended, non tender, incision well healed  Genitourinary: Voiding spontaneously  Extremities: SCD's in place and working bilaterally  Vascular: Palpable dp pulses bilaterally  Neurological: A&O x3  Skin: R heel ulcer: no drainage, no cellulitis   Musculoskeletal: Moving all extremities  Psychiatric: Responsive

## 2020-12-18 NOTE — PROGRESS NOTE ADULT - ASSESSMENT
65M with PMHx of IDDM, HLD, obesity, HFpEF with mild LV diastolic dysfunction, MGUS, chronic anemia with a history of duodenal ulcer 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  Underwent OLT on 7/2/2020, post op coure c/b VRE bacteremia tx with linezolid and delirium likely in setting of CNI toxicity (FK discontinued/Everolimus initiated 7/27).    Recent admission from 11/20-12/2 for OM of the R heel, s/p debridement with Podiatry, and was discharged with Cefepime x 6 weeks via PICC line (placed 12/1).   Initial wound cx on admission grew Pseudomonas, OR cx grew staph epi.  He now presents with from clinic rising transaminitis and MISA (SCr 3.3 from 1.5)    [] Febrile, r/o Sepsis  - Recent h/o R heel OM, s/p debridement. Podiatry consulted: no signs of infection  - ID:  afebrile for last 24 hours. Was on Cefepime to cover pseudomonas, now on Meropenem (previous ESBL E coli Prostate abscess and VRE bacteremia post-transplant).  Daptomycin DC per ID    - ID recs for today send RVP/COVID - negative  - Continue oral Vancomycin for + CDiff PCR  - TTE w/o obvious evidence of TTE  - Diarrhea: no further diarrhea. CDiff PCR positive.  Continue PO Vanco.   CMV PCR not detected pending (CMV +/-, valcyte dose reduced to 450mg daily last admission in setting of leukopenia)  - FU PICC tip Cx- negative  - FU BCx 12/14 ngtd, UCx pending    [] MISA   - SCr increasing 6.6 today, good UOP   (SCr 1.5 on discharge)  - Medications renally adjusted  - 5mm distal ureteral calculus, no hydro noted on CT scan yesterday  ( h/o L renal calculus on prior CT (0.7cm), fullness seen on renal collecting system  - monitor UOP  -   - pending CPK, C3C4  - RLE duplex 2/2 increased edema, negative    [] s/p OLT, now with transaminitis  - Everolimus 1.5mg bid, MMF on hold since last admission in setting of OM; Pred 5mg daily  - PPx: Valcyte  renally dosed, Stopped Bactrim  - Liver doppler with patent vessels     [] DM  - Controlled  - diabetic diet  - cont Lantus 10u qhs  - cont ISS

## 2020-12-18 NOTE — PROGRESS NOTE ADULT - ATTENDING COMMENTS
Kidney function continues to worsen despite starting pred 60 for allergic interstitial nephritis yesterday.  Still making urine.   Liver enzymes stable.

## 2020-12-18 NOTE — PROGRESS NOTE ADULT - ASSESSMENT
64 year old man type 2 diabetes, HLD, GERD, HFpEF with mild LV diastolic dysfunction, and decompensated JEAN cirrhosis, duodenal ulcer, GAVE and duodenal AVM s/p APC (last on 10/11/19) and SHENG who is now s/p liver transplant (7/2/20) with post-op course c/b tacrolimus toxicity and hospital delirium improving after stopping CNI and replacing w/ everolimus. Patient recently presented 11/20/20 with worsening LE swelling and right foot wound with wound culture 11/24 growing S. epidermidis and calcaneus bone biopsy culture 11/24 demonstrating osteomyelitis sent home with cefepime. Patient now with sepsis and MISA likely secondary to cdiff.    Impression:   # Cdiff: on po vanco now. WBC wnl. Diarrhea resolved  # MISA: Cr increased slightly, may be plateuing. Remains nonoliguric MISA. Kidney slightly enlarged on u/s- may suggest AIN. nonoliguric. FeNa and FeUrea suggest possible AIN (from cefepime) vs acute tubular necrosis likely from cdiff and volume loss.  # Right foot osteomyelitis:  reviewed by podiatry, appears stable. s/p surgical debridement and graft placement on 11/24 by Podiatry. Wound culture grew pseudomonas and bone bx on 11/24 growing S. epidermidis - likely contaminant. Had PICC line, now removed. On meropenem  # Otitis externa: on floxin drops  # S/p liver transplant 7/2/20: Transaminases and INR mildly elevated likely in setting of infection.   Recipient Info:   ABO: A  CMV:  Neg  EBV Positive  Donor:  Donor ID:  [id number] Match: [id number]  Age: 29 ABO:  A1 High Risk:   No COD: Cardiovascular  Anti CMV positive, EBV IgG- positive  HepBcAb-neg, Hepatitis C-DANA- neg, Hepatitis C ab-neg  # Bladder wall thickening  # Type 2 diabetes  # SHENG     Recommendations:  - continue with po vanco  - continue with floxin drops per ENT  - continue with meropenem  - increase bicarb dose per renal  - bob for strict I/Os  - prednisone dosing per transplant surgery and renal reccs  - appreciate transplant ID, renal, surgery reccs  - hold bactrim given MISA  - immunosuppression per transplant surgery; continue with everolimus and prednisone  - monitor CBC, CMP, INR  - transplant hepatology to follow

## 2020-12-19 LAB
ALBUMIN SERPL ELPH-MCNC: 2.6 G/DL — LOW (ref 3.3–5)
ALP SERPL-CCNC: 222 U/L — HIGH (ref 40–120)
ALT FLD-CCNC: 47 U/L — HIGH (ref 10–45)
AMMONIA BLD-MCNC: 35 UMOL/L — SIGNIFICANT CHANGE UP (ref 11–55)
ANION GAP SERPL CALC-SCNC: 16 MMOL/L — SIGNIFICANT CHANGE UP (ref 5–17)
APTT BLD: 26.1 SEC — LOW (ref 27.5–35.5)
AST SERPL-CCNC: 31 U/L — SIGNIFICANT CHANGE UP (ref 10–40)
BASOPHILS # BLD AUTO: 0.02 K/UL — SIGNIFICANT CHANGE UP (ref 0–0.2)
BASOPHILS NFR BLD AUTO: 0.4 % — SIGNIFICANT CHANGE UP (ref 0–2)
BILIRUB SERPL-MCNC: 0.1 MG/DL — LOW (ref 0.2–1.2)
BUN SERPL-MCNC: 92 MG/DL — HIGH (ref 7–23)
CALCIUM SERPL-MCNC: 8.2 MG/DL — LOW (ref 8.4–10.5)
CHLORIDE SERPL-SCNC: 101 MMOL/L — SIGNIFICANT CHANGE UP (ref 96–108)
CK SERPL-CCNC: 41 U/L — SIGNIFICANT CHANGE UP (ref 30–200)
CO2 SERPL-SCNC: 16 MMOL/L — LOW (ref 22–31)
CREAT SERPL-MCNC: 6.94 MG/DL — HIGH (ref 0.5–1.3)
EOSINOPHIL # BLD AUTO: 0.02 K/UL — SIGNIFICANT CHANGE UP (ref 0–0.5)
EOSINOPHIL NFR BLD AUTO: 0.4 % — SIGNIFICANT CHANGE UP (ref 0–6)
GLUCOSE BLDC GLUCOMTR-MCNC: 155 MG/DL — HIGH (ref 70–99)
GLUCOSE BLDC GLUCOMTR-MCNC: 164 MG/DL — HIGH (ref 70–99)
GLUCOSE BLDC GLUCOMTR-MCNC: 187 MG/DL — HIGH (ref 70–99)
GLUCOSE BLDC GLUCOMTR-MCNC: 245 MG/DL — HIGH (ref 70–99)
GLUCOSE SERPL-MCNC: 224 MG/DL — HIGH (ref 70–99)
HAV IGM SER-ACNC: SIGNIFICANT CHANGE UP
HBV CORE IGM SER-ACNC: SIGNIFICANT CHANGE UP
HBV SURFACE AG SER-ACNC: SIGNIFICANT CHANGE UP
HCT VFR BLD CALC: 22.9 % — LOW (ref 39–50)
HCV AB S/CO SERPL IA: 0.06 S/CO — SIGNIFICANT CHANGE UP (ref 0–0.99)
HCV AB SERPL-IMP: SIGNIFICANT CHANGE UP
HGB BLD-MCNC: 7.4 G/DL — LOW (ref 13–17)
IMM GRANULOCYTES NFR BLD AUTO: 0.9 % — SIGNIFICANT CHANGE UP (ref 0–1.5)
INR BLD: 1.1 RATIO — SIGNIFICANT CHANGE UP (ref 0.88–1.16)
LYMPHOCYTES # BLD AUTO: 0.53 K/UL — LOW (ref 1–3.3)
LYMPHOCYTES # BLD AUTO: 11.8 % — LOW (ref 13–44)
MAGNESIUM SERPL-MCNC: 2.2 MG/DL — SIGNIFICANT CHANGE UP (ref 1.6–2.6)
MCHC RBC-ENTMCNC: 28.7 PG — SIGNIFICANT CHANGE UP (ref 27–34)
MCHC RBC-ENTMCNC: 32.3 GM/DL — SIGNIFICANT CHANGE UP (ref 32–36)
MCV RBC AUTO: 88.8 FL — SIGNIFICANT CHANGE UP (ref 80–100)
MONOCYTES # BLD AUTO: 0.43 K/UL — SIGNIFICANT CHANGE UP (ref 0–0.9)
MONOCYTES NFR BLD AUTO: 9.6 % — SIGNIFICANT CHANGE UP (ref 2–14)
NEUTROPHILS # BLD AUTO: 3.46 K/UL — SIGNIFICANT CHANGE UP (ref 1.8–7.4)
NEUTROPHILS NFR BLD AUTO: 76.9 % — SIGNIFICANT CHANGE UP (ref 43–77)
NRBC # BLD: 0 /100 WBCS — SIGNIFICANT CHANGE UP (ref 0–0)
PHOSPHATE SERPL-MCNC: 4.6 MG/DL — HIGH (ref 2.5–4.5)
PLATELET # BLD AUTO: 179 K/UL — SIGNIFICANT CHANGE UP (ref 150–400)
POTASSIUM SERPL-MCNC: 4.7 MMOL/L — SIGNIFICANT CHANGE UP (ref 3.5–5.3)
POTASSIUM SERPL-SCNC: 4.7 MMOL/L — SIGNIFICANT CHANGE UP (ref 3.5–5.3)
PROT SERPL-MCNC: 5.8 G/DL — LOW (ref 6–8.3)
PROTHROM AB SERPL-ACNC: 13.1 SEC — SIGNIFICANT CHANGE UP (ref 10.6–13.6)
RBC # BLD: 2.58 M/UL — LOW (ref 4.2–5.8)
RBC # FLD: 13.9 % — SIGNIFICANT CHANGE UP (ref 10.3–14.5)
SODIUM SERPL-SCNC: 133 MMOL/L — LOW (ref 135–145)
WBC # BLD: 4.5 K/UL — SIGNIFICANT CHANGE UP (ref 3.8–10.5)
WBC # FLD AUTO: 4.5 K/UL — SIGNIFICANT CHANGE UP (ref 3.8–10.5)

## 2020-12-19 PROCEDURE — 99232 SBSQ HOSP IP/OBS MODERATE 35: CPT

## 2020-12-19 PROCEDURE — 99233 SBSQ HOSP IP/OBS HIGH 50: CPT

## 2020-12-19 PROCEDURE — 99291 CRITICAL CARE FIRST HOUR: CPT

## 2020-12-19 PROCEDURE — 99232 SBSQ HOSP IP/OBS MODERATE 35: CPT | Mod: GC

## 2020-12-19 PROCEDURE — 71045 X-RAY EXAM CHEST 1 VIEW: CPT | Mod: 26

## 2020-12-19 RX ORDER — CHLORHEXIDINE GLUCONATE 213 G/1000ML
1 SOLUTION TOPICAL
Refills: 0 | Status: DISCONTINUED | OUTPATIENT
Start: 2020-12-19 | End: 2020-12-19

## 2020-12-19 RX ORDER — VALGANCICLOVIR 450 MG/1
450 TABLET, FILM COATED ORAL
Refills: 0 | Status: DISCONTINUED | OUTPATIENT
Start: 2020-12-19 | End: 2020-12-23

## 2020-12-19 RX ORDER — CHLORHEXIDINE GLUCONATE 213 G/1000ML
1 SOLUTION TOPICAL
Refills: 0 | Status: DISCONTINUED | OUTPATIENT
Start: 2020-12-20 | End: 2020-12-30

## 2020-12-19 RX ORDER — ERYTHROPOIETIN 10000 [IU]/ML
10000 INJECTION, SOLUTION INTRAVENOUS; SUBCUTANEOUS
Refills: 0 | Status: DISCONTINUED | OUTPATIENT
Start: 2020-12-19 | End: 2020-12-30

## 2020-12-19 RX ORDER — SODIUM CHLORIDE 9 MG/ML
10 INJECTION INTRAMUSCULAR; INTRAVENOUS; SUBCUTANEOUS
Refills: 0 | Status: DISCONTINUED | OUTPATIENT
Start: 2020-12-19 | End: 2020-12-19

## 2020-12-19 RX ORDER — INSULIN LISPRO 100/ML
2 VIAL (ML) SUBCUTANEOUS ONCE
Refills: 0 | Status: DISCONTINUED | OUTPATIENT
Start: 2020-12-19 | End: 2020-12-19

## 2020-12-19 RX ORDER — ERYTHROPOIETIN 10000 [IU]/ML
10000 INJECTION, SOLUTION INTRAVENOUS; SUBCUTANEOUS
Refills: 0 | Status: DISCONTINUED | OUTPATIENT
Start: 2020-12-19 | End: 2020-12-19

## 2020-12-19 RX ORDER — MEROPENEM 1 G/30ML
500 INJECTION INTRAVENOUS
Refills: 0 | Status: DISCONTINUED | OUTPATIENT
Start: 2020-12-20 | End: 2020-12-23

## 2020-12-19 RX ORDER — INSULIN LISPRO 100/ML
5 VIAL (ML) SUBCUTANEOUS
Refills: 0 | Status: DISCONTINUED | OUTPATIENT
Start: 2020-12-19 | End: 2020-12-21

## 2020-12-19 RX ADMIN — EVEROLIMUS 1.5 MILLIGRAM(S): 10 TABLET ORAL at 19:38

## 2020-12-19 RX ADMIN — Medication 125 MILLIGRAM(S): at 23:52

## 2020-12-19 RX ADMIN — Medication 125 MILLIGRAM(S): at 05:06

## 2020-12-19 RX ADMIN — Medication 500000 UNIT(S): at 12:13

## 2020-12-19 RX ADMIN — ERYTHROPOIETIN 10000 UNIT(S): 10000 INJECTION, SOLUTION INTRAVENOUS; SUBCUTANEOUS at 22:00

## 2020-12-19 RX ADMIN — Medication 125 MILLIGRAM(S): at 17:21

## 2020-12-19 RX ADMIN — Medication 2: at 08:33

## 2020-12-19 RX ADMIN — Medication 500000 UNIT(S): at 23:52

## 2020-12-19 RX ADMIN — Medication 1: at 12:12

## 2020-12-19 RX ADMIN — Medication 500000 UNIT(S): at 05:05

## 2020-12-19 RX ADMIN — Medication 75 MEQ/KG/HR: at 08:35

## 2020-12-19 RX ADMIN — Medication 5 MILLIGRAM(S): at 17:23

## 2020-12-19 RX ADMIN — EVEROLIMUS 1.5 MILLIGRAM(S): 10 TABLET ORAL at 10:21

## 2020-12-19 RX ADMIN — Medication 10 DROP(S): at 17:23

## 2020-12-19 RX ADMIN — HEPARIN SODIUM 5000 UNIT(S): 5000 INJECTION INTRAVENOUS; SUBCUTANEOUS at 05:05

## 2020-12-19 RX ADMIN — PANTOPRAZOLE SODIUM 40 MILLIGRAM(S): 20 TABLET, DELAYED RELEASE ORAL at 08:35

## 2020-12-19 RX ADMIN — Medication 81 MILLIGRAM(S): at 12:13

## 2020-12-19 RX ADMIN — HEPARIN SODIUM 5000 UNIT(S): 5000 INJECTION INTRAVENOUS; SUBCUTANEOUS at 17:21

## 2020-12-19 RX ADMIN — MAGNESIUM OXIDE 400 MG ORAL TABLET 400 MILLIGRAM(S): 241.3 TABLET ORAL at 12:14

## 2020-12-19 RX ADMIN — Medication 1: at 17:24

## 2020-12-19 RX ADMIN — Medication 5 UNIT(S): at 17:25

## 2020-12-19 RX ADMIN — Medication 5 MILLIGRAM(S): at 05:05

## 2020-12-19 RX ADMIN — Medication 125 MILLIGRAM(S): at 12:14

## 2020-12-19 RX ADMIN — TAMSULOSIN HYDROCHLORIDE 0.4 MILLIGRAM(S): 0.4 CAPSULE ORAL at 22:03

## 2020-12-19 RX ADMIN — Medication 1300 MILLIGRAM(S): at 22:03

## 2020-12-19 RX ADMIN — MEROPENEM 100 MILLIGRAM(S): 1 INJECTION INTRAVENOUS at 17:20

## 2020-12-19 RX ADMIN — Medication 500000 UNIT(S): at 17:20

## 2020-12-19 RX ADMIN — INSULIN GLARGINE 10 UNIT(S): 100 INJECTION, SOLUTION SUBCUTANEOUS at 22:27

## 2020-12-19 RX ADMIN — Medication 1300 MILLIGRAM(S): at 05:05

## 2020-12-19 RX ADMIN — Medication 1300 MILLIGRAM(S): at 15:22

## 2020-12-19 RX ADMIN — Medication 5 UNIT(S): at 08:34

## 2020-12-19 NOTE — PROGRESS NOTE ADULT - ATTENDING COMMENTS
Dr. Mace (Attending Physician)  N - alert and oriented  P - no acute resp issues  C - not on antihtn agents  GI - LFTs elevated but improving, C. diff   - Acute renal failure likely AIN, Eosinophils in urine, will send urine sodium and Cr made 750 UO, po bicarb and bicarb gtt, will check post-void residual, FeNa 4.5%, will get HD today  H - on home aspirin, sqh for dvt ppx  ID - was on cefepime for osteo growing pseudomonas, switched to meropenem, on po vanc for C. diff, valcyte and nystatin, will add on procalcitonin  E - got one dose of steroids now off, hyperglycemic

## 2020-12-19 NOTE — PROGRESS NOTE ADULT - ATTENDING COMMENTS
symptoms of uremia, trialysis line placed. will give HD today.   on Lachelle, PO vanc.   Immuno: everolimus/pred 5mg BID

## 2020-12-19 NOTE — PROGRESS NOTE ADULT - SUBJECTIVE AND OBJECTIVE BOX
Transplant Surgery - Multidisciplinary Rounds  --------------------------------------------------------------  s/p OLT 7/2/2020      Admitted 12/14/2020 with elevated LFTs, MISA, diarrhea    Present:  Patient seen and examined with multidisciplinary team including Transplant Surgeon: Dr. Gray, Transplant Nephrologist: Dr. Rowland, OLY Bingham and unit RN during am rounds. Disciplines not in attendance will be notified of the plan.      HPI: 65M with PMHx of IDDM, HLD, obesity, HFpEF with mild LV diastolic dysfunction, MGUS, chronic anemia with a history of duodenal ulcer 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  Underwent OLT on 7/2/2020, post op course c/b VRE bacteremia tx with linezolid and delirium likely in setting of CNI toxicity (FK discontinued/Everolimus initiated 7/27).    Recent admission from 11/20-12/2 for OM of the R heel, s/p debridement with Podiatry, and was discharged with Cefepime x 6 weeks via PICC line (placed 12/1). Initial wound cx on admission grew Pseudomonas, OR cx grew staph epi. Was also found with neutropenia and Valcyte was reduced (was on 900mg daily CMV +/-).      Sent to ED from clinic with rising transaminitis and MISA (SCr 3.3 from 1.5). Hospital course c/b C diff colitis and worsening renal function.     Interval Events:  - Afebrile, stable VSS  - Anbx: remains on meropenem, PO vanco, Ofloxacin (R OE)  - Worsening renal function: Bun/Cr 92/6.92, c/o metallic taste in his mouth  - Received 500cc NS bolus yesterday and started on 1/2NS with 75meq bicarb @75cc   - Transferred to SICU overnight with concern for uremic encephalopathy  - AO x 3 (person/time/place), + encephalopathy, says "God visited him"   - LFTS improving; ammonia 35  - FS ~200s    Potential Discharge date: pending clinical improvement     Education:  Medications    Plan of care:  See Below    MEDICATIONS  (STANDING):  aspirin  chewable 81 milliGRAM(s) Oral daily  chlorhexidine 2% Cloths 1 Application(s) Topical daily  everolimus (ZORTRESS) 1.5 milliGRAM(s) Oral <User Schedule>  heparin   Injectable 5000 Unit(s) SubCutaneous every 12 hours  insulin glargine Injectable (LANTUS) 10 Unit(s) SubCutaneous at bedtime  insulin lispro (ADMELOG) corrective regimen sliding scale   SubCutaneous three times a day before meals  insulin lispro (ADMELOG) corrective regimen sliding scale   SubCutaneous at bedtime  insulin lispro Injectable (ADMELOG) 5 Unit(s) SubCutaneous three times a day before meals  magnesium oxide 400 milliGRAM(s) Oral daily  meropenem  IVPB 500 milliGRAM(s) IV Intermittent every 24 hours  nystatin    Suspension 773512 Unit(s) Oral four times a day  ofloxacin 0.3% Solution 10 Drop(s) Right Ear two times a day  pantoprazole    Tablet 40 milliGRAM(s) Oral before breakfast  predniSONE   Tablet 5 milliGRAM(s) Oral two times a day  sodium bicarbonate 1300 milliGRAM(s) Oral three times a day  sodium bicarbonate  Infusion 0.05 mEq/kG/Hr (75 mL/Hr) IV Continuous <Continuous>  tamsulosin 0.4 milliGRAM(s) Oral at bedtime  valGANciclovir 450 milliGRAM(s) Oral <User Schedule>  vancomycin    Solution 125 milliGRAM(s) Oral every 6 hours      PAST MEDICAL & SURGICAL HISTORY:  GIB (gastrointestinal bleeding)  GERD with esophagitis  Gastritis &amp; Non Bleeding Ulcers  Hepatic encephalopathy  Obesity  Fatty liver disease, nonalcoholic  Renal stones  Hypertension  Neuropathy  Hypercholesteremia  Diabetes  S/P cholecystectomy    Vital Signs Last 24 Hrs  T(C): 36.8 (19 Dec 2020 07:00), Max: 37.2 (19 Dec 2020 04:00)  T(F): 98.2 (19 Dec 2020 07:00), Max: 99 (19 Dec 2020 04:00)  HR: 78 (19 Dec 2020 07:00) (75 - 91)  BP: 136/63 (19 Dec 2020 07:00) (118/81 - 163/70)  BP(mean): 90 (19 Dec 2020 07:00) (90 - 105)  RR: 16 (19 Dec 2020 07:00) (16 - 20)  SpO2: 93% (19 Dec 2020 07:00) (92% - 98%)    I&O's Summary    18 Dec 2020 07:01  -  19 Dec 2020 07:00  --------------------------------------------------------  IN: 750 mL / OUT: 750 mL / NET: 0 mL    19 Dec 2020 07:01  -  19 Dec 2020 10:20  --------------------------------------------------------  IN: 150 mL / OUT: 500 mL / NET: -350 mL                        7.4    4.50  )-----------( 179      ( 19 Dec 2020 05:29 )             22.9     12-19    133<L>  |  101  |  92<H>  ----------------------------<  224<H>  4.7   |  16<L>  |  6.94<H>    Ca    8.2<L>      19 Dec 2020 05:29  Phos  4.6     12-19  Mg     2.2     12-19    TPro  5.8<L>  /  Alb  2.6<L>  /  TBili  0.1<L>  /  DBili  x   /  AST  31  /  ALT  47<H>  /  AlkPhos  222<H>  12-19    Culture - Urine (collected 12-17-20 @ 20:08)  Source: .Urine Catheterized  Final Report (12-18-20 @ 15:59):    No growth    Culture - Catheter (collected 12-15-20 @ 16:43)  Source: .Catheter PICC line tip  Final Report (12-17-20 @ 18:03):    < 15 colonies Coag Negative Staphylococcus    Culture - Urine (collected 12-15-20 @ 16:28)  Source: .Urine Clean Catch (Midstream)  Final Report (12-16-20 @ 12:12):    <10,000 CFU/mL Normal Urogenital Shantel    Culture - Blood (collected 12-14-20 @ 23:13)  Source: .Blood Blood-Peripheral  Preliminary Report (12-16-20 @ 01:02):    No growth to date.    Culture - Blood (collected 12-14-20 @ 23:13)  Source: .Blood Blood-Peripheral  Preliminary Report (12-16-20 @ 01:02):    No growth to date.    Culture - Blood (collected 12-14-20 @ 23:13)  Source: .Blood PICC/PERC Single Lumen  Preliminary Report (12-16-20 @ 01:02):    No growth to date.    Review of systems  Gen: tired  Skin:  R heel wound   Head/Eyes/Ears/Mouth: No headache; Normal hearing; Normal vision w/o blurriness; No sinus pain/discomfort, sore throat  Respiratory: No dyspnea, cough, wheezing, hemoptysis  CV: No chest pain, PND, orthopnea  GI:  denies abdominal pain, diarrhea, constipation, nausea, vomiting, melena, hematochezia  : No increased frequency, dysuria, hematuria, nocturia  MSK: No joint pain/swelling; no back pain; no edema  Neuro: No dizziness/lightheadedness, weakness, seizures, numbness, tingling  Heme: No easy bruising or bleeding  Endo: No heat/cold intolerance  Psych: No significant nervousness, anxiety, stress, depression  All other systems were reviewed and are negative, except as noted.      PHYSICAL EXAM:  Constitutional: Well developed / well nourished  Eyes: Anicteric, PERRLA  ENMT: nc/at  Respiratory: CTA B/L  Cardiovascular: RRR, + heart murmur  Gastrointestinal: Soft, non distended, non tender, incision well healed  Genitourinary: Voiding spontaneously  Extremities: SCD's in place and working bilaterally  Vascular: Palpable dp pulses bilaterally  Neurological: A&O x3  Skin: R heel ulcer: no drainage, no cellulitis   Musculoskeletal: Moving all extremities  Psychiatric: Responsive     Transplant Surgery - Multidisciplinary Rounds  --------------------------------------------------------------  s/p OLT 7/2/2020      Admitted 12/14/2020 with elevated LFTs, MISA, diarrhea    Present:  Patient seen and examined with multidisciplinary team including Transplant Surgeon: Dr. Gray, Transplant Nephrologist: Dr. Rowland, OLY Bingham and unit RN during am rounds. Disciplines not in attendance will be notified of the plan.      HPI: 65M with PMHx of IDDM, HLD, obesity, HFpEF with mild LV diastolic dysfunction, MGUS, chronic anemia with a history of duodenal ulcer 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  Underwent OLT on 7/2/2020, post op course c/b VRE bacteremia tx with linezolid and delirium likely in setting of CNI toxicity (FK discontinued/Everolimus initiated 7/27).    Recent admission from 11/20-12/2 for OM of the R heel, s/p debridement with Podiatry, and was discharged with Cefepime x 6 weeks via PICC line (placed 12/1). Initial wound cx on admission grew Pseudomonas, OR cx grew staph epi. Was also found with neutropenia and Valcyte was reduced (was on 900mg daily CMV +/-).      Sent to ED from clinic with rising transaminitis and MISA (SCr 3.3 from 1.5). Hospital course c/b C diff colitis and worsening renal function.     Interval Events:  - Afebrile, stable VSS  - Anbx: remains on meropenem, PO vanco, Ofloxacin (R OE)  - Worsening renal function: Bun/Cr 92/6.92, c/o metallic taste in his mouth and episode of epistaxis  - Received 500cc NS bolus yesterday and started on 1/2NS with 75meq bicarb @75cc   - Transferred to SICU overnight with concern for uremic encephalopathy  - AO x 3 (person/time/place), + encephalopathy (hallucinations)  - LFTS improving; ammonia 35  - FS ~200s    Potential Discharge date: pending clinical improvement     Education:  Medications    Plan of care:  See Below    MEDICATIONS  (STANDING):  aspirin  chewable 81 milliGRAM(s) Oral daily  chlorhexidine 2% Cloths 1 Application(s) Topical daily  everolimus (ZORTRESS) 1.5 milliGRAM(s) Oral <User Schedule>  heparin   Injectable 5000 Unit(s) SubCutaneous every 12 hours  insulin glargine Injectable (LANTUS) 10 Unit(s) SubCutaneous at bedtime  insulin lispro (ADMELOG) corrective regimen sliding scale   SubCutaneous three times a day before meals  insulin lispro (ADMELOG) corrective regimen sliding scale   SubCutaneous at bedtime  insulin lispro Injectable (ADMELOG) 5 Unit(s) SubCutaneous three times a day before meals  magnesium oxide 400 milliGRAM(s) Oral daily  meropenem  IVPB 500 milliGRAM(s) IV Intermittent every 24 hours  nystatin    Suspension 730609 Unit(s) Oral four times a day  ofloxacin 0.3% Solution 10 Drop(s) Right Ear two times a day  pantoprazole    Tablet 40 milliGRAM(s) Oral before breakfast  predniSONE   Tablet 5 milliGRAM(s) Oral two times a day  sodium bicarbonate 1300 milliGRAM(s) Oral three times a day  sodium bicarbonate  Infusion 0.05 mEq/kG/Hr (75 mL/Hr) IV Continuous <Continuous>  tamsulosin 0.4 milliGRAM(s) Oral at bedtime  valGANciclovir 450 milliGRAM(s) Oral <User Schedule>  vancomycin    Solution 125 milliGRAM(s) Oral every 6 hours      PAST MEDICAL & SURGICAL HISTORY:  GIB (gastrointestinal bleeding)  GERD with esophagitis  Gastritis &amp; Non Bleeding Ulcers  Hepatic encephalopathy  Obesity  Fatty liver disease, nonalcoholic  Renal stones  Hypertension  Neuropathy  Hypercholesteremia  Diabetes  S/P cholecystectomy    Vital Signs Last 24 Hrs  T(C): 36.8 (19 Dec 2020 07:00), Max: 37.2 (19 Dec 2020 04:00)  T(F): 98.2 (19 Dec 2020 07:00), Max: 99 (19 Dec 2020 04:00)  HR: 78 (19 Dec 2020 07:00) (75 - 91)  BP: 136/63 (19 Dec 2020 07:00) (118/81 - 163/70)  BP(mean): 90 (19 Dec 2020 07:00) (90 - 105)  RR: 16 (19 Dec 2020 07:00) (16 - 20)  SpO2: 93% (19 Dec 2020 07:00) (92% - 98%)    I&O's Summary    18 Dec 2020 07:01  -  19 Dec 2020 07:00  --------------------------------------------------------  IN: 750 mL / OUT: 750 mL / NET: 0 mL    19 Dec 2020 07:01  -  19 Dec 2020 10:20  --------------------------------------------------------  IN: 150 mL / OUT: 500 mL / NET: -350 mL                        7.4    4.50  )-----------( 179      ( 19 Dec 2020 05:29 )             22.9     12-19    133<L>  |  101  |  92<H>  ----------------------------<  224<H>  4.7   |  16<L>  |  6.94<H>    Ca    8.2<L>      19 Dec 2020 05:29  Phos  4.6     12-19  Mg     2.2     12-19    TPro  5.8<L>  /  Alb  2.6<L>  /  TBili  0.1<L>  /  DBili  x   /  AST  31  /  ALT  47<H>  /  AlkPhos  222<H>  12-19    Culture - Urine (collected 12-17-20 @ 20:08)  Source: .Urine Catheterized  Final Report (12-18-20 @ 15:59):    No growth    Culture - Catheter (collected 12-15-20 @ 16:43)  Source: .Catheter PICC line tip  Final Report (12-17-20 @ 18:03):    < 15 colonies Coag Negative Staphylococcus    Culture - Urine (collected 12-15-20 @ 16:28)  Source: .Urine Clean Catch (Midstream)  Final Report (12-16-20 @ 12:12):    <10,000 CFU/mL Normal Urogenital Shantel    Culture - Blood (collected 12-14-20 @ 23:13)  Source: .Blood Blood-Peripheral  Preliminary Report (12-16-20 @ 01:02):    No growth to date.    Culture - Blood (collected 12-14-20 @ 23:13)  Source: .Blood Blood-Peripheral  Preliminary Report (12-16-20 @ 01:02):    No growth to date.    Culture - Blood (collected 12-14-20 @ 23:13)  Source: .Blood PICC/PERC Single Lumen  Preliminary Report (12-16-20 @ 01:02):    No growth to date.    Review of systems  Gen: tired  Skin:  R heel wound   Head/Eyes/Ears/Mouth: No headache; Normal hearing; Normal vision w/o blurriness; No sinus pain/discomfort, sore throat  Respiratory: No dyspnea, cough, wheezing, hemoptysis  CV: No chest pain, PND, orthopnea  GI:  denies abdominal pain, diarrhea, constipation, nausea, vomiting, melena, hematochezia  : No increased frequency, dysuria, hematuria, nocturia  MSK: No joint pain/swelling; no back pain; no edema  Neuro: No dizziness/lightheadedness, weakness, seizures, numbness, tingling  Heme: No easy bruising or bleeding  Endo: No heat/cold intolerance  Psych: No significant nervousness, anxiety, stress, depression  All other systems were reviewed and are negative, except as noted.      PHYSICAL EXAM:  Constitutional: Well developed / well nourished  Eyes: Anicteric, PERRLA  ENMT: nc/at  Respiratory: CTA B/L  Cardiovascular: RRR, + heart murmur  Gastrointestinal: Soft, non distended, non tender, incision well healed  Genitourinary: Voiding spontaneously  Extremities: SCD's in place and working bilaterally  Vascular: Palpable dp pulses bilaterally  Neurological: A&O x3  Skin: R heel ulcer: no drainage, no cellulitis   Musculoskeletal: Moving all extremities  Psychiatric: Responsive

## 2020-12-19 NOTE — PROGRESS NOTE ADULT - SUBJECTIVE AND OBJECTIVE BOX
[facility] DIVISION OF KIDNEY DISEASES AND HYPERTENSION -- FOLLOW UP NOTE  --------------------------------------------------------------------------------  Authored by: Adele Rowland   Cell # [phone]     JESSICA MARTIN was seen and examined at bedside with the transplant surgery team.     HPI: 65 yr old man with history of JEAN/Cirrhosis 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 on immunosuppression with Everolimus and CellCept. He is admitted with MISA and new transaminitis.     PMH: DM on insulin, hyperlipidemia, mild diastolic dysfunction, MGUS, chronic anemia, duodenal ulcer.   Post liver transplant he was unable to tolerate CNI due to neurotoxicity and was switched to Everolimus.   He has non healing left heal ulcer that was recently complicated by osteomyelitis s/p debridement by podiatry, he was on IV cefepime x 6 week course of which he completed 2 weeks.     Cefepime switched to meropenem. Stool +ve for C.diff but All cultures remain negative. Fever has subsided. Diarrhea has resolved. He has worsening azotemia.   Yesterday afternoon he was more somnolent, he was fully oriented when awakened. Repeat chem showed worsening BUN/Cr. He was complaining of metallic taste and poor appetite but no nausea or vomiting. Given IV NS 500ml bolus followed by bicarb drip at 75cc/hour. /24 hours. This AM  ml.   He reports feeling better. No fever. No diarrhea.  No nausea or vomiting but still has poor appetite and metallic taste, also has bleeding from the nose.       Standing Inpatient Medications  aspirin  chewable 81 milliGRAM(s) Oral daily  everolimus (ZORTRESS) 1.5 milliGRAM(s) Oral <User Schedule>  heparin   Injectable 5000 Unit(s) SubCutaneous every 12 hours  insulin glargine Injectable (LANTUS) 10 Unit(s) SubCutaneous at bedtime  insulin lispro (ADMELOG) corrective regimen sliding scale   SubCutaneous three times a day before meals  insulin lispro (ADMELOG) corrective regimen sliding scale   SubCutaneous at bedtime  insulin lispro Injectable (ADMELOG) 5 Unit(s) SubCutaneous three times a day before meals  magnesium oxide 400 milliGRAM(s) Oral daily  meropenem  IVPB 500 milliGRAM(s) IV Intermittent every 24 hours  nystatin    Suspension 681184 Unit(s) Oral four times a day  ofloxacin 0.3% Solution 10 Drop(s) Right Ear two times a day  pantoprazole    Tablet 40 milliGRAM(s) Oral before breakfast  predniSONE   Tablet 5 milliGRAM(s) Oral two times a day  sodium bicarbonate 1300 milliGRAM(s) Oral three times a day  sodium bicarbonate  Infusion 0.05 mEq/kG/Hr IV Continuous <Continuous>  tamsulosin 0.4 milliGRAM(s) Oral at bedtime  valGANciclovir 450 milliGRAM(s) Oral <User Schedule>  vancomycin    Solution 125 milliGRAM(s) Oral every 6 hours        VITALS/PHYSICAL EXAM  --------------------------------------------------------------------------------  T(C): 36.8 (12-19-20 @ 07:00), Max: 37.2 (12-19-20 @ 04:00)  HR: 78 (12-19-20 @ 07:00) (75 - 91)  BP: 136/63 (12-19-20 @ 07:00) (118/81 - 163/70)  RR: 16 (12-19-20 @ 07:00) (16 - 20)  SpO2: 93% (12-19-20 @ 07:00) (92% - 98%)    12-18-20 @ 07:01  -  12-19-20 @ 07:00  --------------------------------------------------------  IN: 750 mL / OUT: 750 mL / NET: 0 mL    12-19-20 @ 07:01  -  12-19-20 @ 10:02  --------------------------------------------------------  IN: 150 mL / OUT: 500 mL / NET: -350 mL    Awake and alert, pale, no signs of distress  moist oral mucosa  no JVD  lungs clear b/l  Systolic murmur at apex   Abdomen obese, soft, non tender  RLE trace edema, LLE no edema, right heel dressing   Alert and oriented, No asterixis       LABS/STUDIES  --------------------------------------------------------------------------------              7.4    4.50  >-----------<  179      [12-19-20 @ 05:29]              22.9     133  |  101  |  92  ----------------------------<  224      [12-19-20 @ 05:29]  4.7   |  16  |  6.94        Ca     8.2     [12-19-20 @ 05:29]      Mg     2.2     [12-19-20 @ 05:29]      Phos  4.6     [12-19-20 @ 05:29]    TPro  5.8  /  Alb  2.6  /  TBili  0.1  /  DBili  x   /  AST  31  /  ALT  47  /  AlkPhos  222  [12-19-20 @ 05:29]    PT/INR: PT 13.1 , INR 1.10       [12-19-20 @ 05:29]  PTT: 26.1       [12-19-20 @ 05:29]    CK 41      [12-19-20 @ 05:29]    Creatinine Trend:  SCr 6.94 [12-19 @ 05:29]  SCr 6.63 [12-18 @ 16:14]  SCr 6.60 [12-18 @ 06:17]  SCr 6.06 [12-17 @ 06:16]  SCr 5.37 [12-16 @ 15:41]      CAPILLARY BLOOD GLUCOSE  POCT Blood Glucose.: 245 mg/dL (19 Dec 2020 08:31)  POCT Blood Glucose.: 210 mg/dL (18 Dec 2020 22:13)  POCT Blood Glucose.: 189 mg/dL (18 Dec 2020 17:32)  POCT Blood Glucose.: 190 mg/dL (18 Dec 2020 14:25)  POCT Blood Glucose.: 213 mg/dL (18 Dec 2020 10:35)      Urinalysis - [12-17-20 @ 16:54]      Color Light Yellow / Appearance Slightly Turbid / SG 1.012 / pH 6.0      Gluc 100 mg/dL / Ketone Negative  / Bili Negative / Urobili Negative       Blood Moderate / Protein 100 mg/dL / Leuk Est Large / Nitrite Negative      RBC 3 /  / Hyaline  / Gran  / Sq Epi  / Non Sq Epi 0 / Bacteria Negative    Urine Creatinine 67      [12-15-20 @ 11:39]  Urine Protein 126      [12-17-20 @ 14:14]  Urine Sodium 44      [12-15-20 @ 11:39]  Urine Urea Nitrogen 367      [12-15-20 @ 15:35]  Urine Chloride 43      [12-15-20 @ 11:39]  Urine Osmolality 311      [12-15-20 @ 11:39]    Iron 160, TIBC Unable to calculate Test Repeated, %sat Unable to calculate Test Repeated      [06-12-20 @ 09:04]  Ferritin 493      [04-29-20 @ 08:20]  HbA1c 6.4      [02-12-20 @ 17:08]  TSH 3.26      [04-26-20 @ 09:47]  Lipid: chol 158, , HDL 44, LDL 93      [08-05-20 @ 08:57]      C3 Complement 158      [12-17-20 @ 16:43]  C4 Complement 38      [12-17-20 @ 16:43]

## 2020-12-19 NOTE — PROGRESS NOTE ADULT - ASSESSMENT
MISA  c diff  s/p liver transplant    cont po vanco  diet as tolerated  HD per renal   lfts improved  hopefully epistaxis and dysgeusia will improve post HD

## 2020-12-19 NOTE — PROGRESS NOTE ADULT - SUBJECTIVE AND OBJECTIVE BOX
Chief Complaint:  Patient is a 65y old  Male who presents with a chief complaint of transaminitis (19 Dec 2020 10:12)      Interval Events:   - No acute events, per team has episodes of mild lethargy  - Diarrhea has significantly improved    Allergies:  codeine (Anaphylaxis)        Hospital Medications:  aspirin  chewable 81 milliGRAM(s) Oral daily  chlorhexidine 2% Cloths 1 Application(s) Topical daily  epoetin barry-epbx (RETACRIT) Injectable 91177 Unit(s) SubCutaneous every 7 days  everolimus (ZORTRESS) 1.5 milliGRAM(s) Oral <User Schedule>  heparin   Injectable 5000 Unit(s) SubCutaneous every 12 hours  insulin glargine Injectable (LANTUS) 10 Unit(s) SubCutaneous at bedtime  insulin lispro (ADMELOG) corrective regimen sliding scale   SubCutaneous three times a day before meals  insulin lispro (ADMELOG) corrective regimen sliding scale   SubCutaneous at bedtime  insulin lispro Injectable (ADMELOG) 5 Unit(s) SubCutaneous three times a day before meals  magnesium oxide 400 milliGRAM(s) Oral daily  meropenem  IVPB 500 milliGRAM(s) IV Intermittent every 24 hours  nystatin    Suspension 850605 Unit(s) Oral four times a day  ofloxacin 0.3% Solution 10 Drop(s) Right Ear two times a day  pantoprazole    Tablet 40 milliGRAM(s) Oral before breakfast  predniSONE   Tablet 5 milliGRAM(s) Oral two times a day  sodium bicarbonate 1300 milliGRAM(s) Oral three times a day  sodium bicarbonate  Infusion 0.05 mEq/kG/Hr IV Continuous <Continuous>  tamsulosin 0.4 milliGRAM(s) Oral at bedtime  valGANciclovir 450 milliGRAM(s) Oral <User Schedule>  vancomycin    Solution 125 milliGRAM(s) Oral every 6 hours      PMHX/PSHX:  GIB (gastrointestinal bleeding)    GERD with esophagitis    Hepatic encephalopathy    Obesity    Fatty liver disease, nonalcoholic    Renal stones    Hypertension    Neuropathy    Hypercholesteremia    Diabetes    S/P cholecystectomy    No significant past surgical history        Family history:  Family history of type 2 diabetes mellitus    Family history of hypertension    Family history of stomach cancer    No pertinent family history in first degree relatives        ROS:   General:  No wt loss, fevers, chills, night sweats, fatigue,   Eyes:  Good vision, no reported pain  ENT:  No sore throat, pain, runny nose, dysphagia  CV:  No pain, palpitations, hypo/hypertension  Resp:  No dyspnea, cough, tachypnea, wheezing  GI:  See HPI  :  No pain, bleeding, incontinence, nocturia  Muscle:  No pain, weakness  Neuro:  No weakness, tingling, memory problems  Psych:  No fatigue, insomnia, mood problems, depression  Endocrine:  No polyuria, polydipsia, cold/heat intolerance  Heme:  No petechiae, ecchymosis, easy bruisability  Skin:  No rash, edema      PHYSICAL EXAM:   Vital Signs:  Vital Signs Last 24 Hrs  T(C): 37 (19 Dec 2020 11:00), Max: 37.2 (19 Dec 2020 04:00)  T(F): 98.6 (19 Dec 2020 11:00), Max: 99 (19 Dec 2020 04:00)  HR: 75 (19 Dec 2020 11:00) (75 - 91)  BP: 162/74 (19 Dec 2020 11:00) (118/81 - 163/70)  BP(mean): 106 (19 Dec 2020 11:00) (90 - 106)  RR: 19 (19 Dec 2020 11:) (16 - 20)  SpO2: 95% (19 Dec 2020 11:00) (92% - 98%)  Daily     Daily Weight in k.8 (18 Dec 2020 20:35)      GENERAL:  appears comfortable, no acute distress  HEENT:  NC/AT,  conjunctivae clear, sclera -anicteric  CHEST:  no increased effort  HEART:  Regular rate and rhythm  ABDOMEN:  Soft, non-tender, non-distended,  no masses ,no hepato-splenomegaly,   EXTREMITIES:  no cyanosis, clubbing or edema  SKIN:  right foot wrapped  NEURO:  Alert, oriented    LABS:                        7.4    4.50  )-----------( 179      ( 19 Dec 2020 05:29 )             22.9     Mean Cell Volume: 88.8 fl (20 @ 05:29)        133<L>  |  101  |  92<H>  ----------------------------<  224<H>  4.7   |  16<L>  |  6.94<H>    Ca    8.2<L>      19 Dec 2020 05:29  Phos  4.6       Mg     2.2         TPro  5.8<L>  /  Alb  2.6<L>  /  TBili  0.1<L>  /  DBili  x   /  AST  31  /  ALT  47<H>  /  AlkPhos  222<H>  12-19    LIVER FUNCTIONS - ( 19 Dec 2020 05:29 )  Alb: 2.6 g/dL / Pro: 5.8 g/dL / ALK PHOS: 222 U/L / ALT: 47 U/L / AST: 31 U/L / GGT: x           PT/INR - ( 19 Dec 2020 05:29 )   PT: 13.1 sec;   INR: 1.10 ratio         PTT - ( 19 Dec 2020 05:29 )  PTT:26.1 sec  Urinalysis Basic - ( 17 Dec 2020 16:54 )    Color: Light Yellow / Appearance: Slightly Turbid / S.012 / pH: x  Gluc: x / Ketone: Negative  / Bili: Negative / Urobili: Negative   Blood: x / Protein: 100 mg/dL / Nitrite: Negative   Leuk Esterase: Large / RBC: 3 /hpf /  /HPF   Sq Epi: x / Non Sq Epi: 0 /hpf / Bacteria: Negative      Amylase Serum--      Lipase serum--       [id number]                          7.4    4.50  )-----------( 179      ( 19 Dec 2020 05:29 )             22.9                         8.3    6.52  )-----------( 188      ( 18 Dec 2020 06:18 )             25.9                         7.8    7.85  )-----------( 159      ( 17 Dec 2020 06:16 )             23.8                         7.6    8.90  )-----------( 136      ( 16 Dec 2020 15:41 )             22.9       Imaging:

## 2020-12-19 NOTE — PROGRESS NOTE ADULT - ASSESSMENT
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, CNI Neurotoxicity), IDDM, hyperlipidemia, obesity, HFpEF with mild LV diastolic dysfunction, MGUS who presented with fevers, diarrhea and abnormal labwork    Of note, recent admission for R Heel Osteomyelitis   s/p right foot heel incision and drainage w/ application of stravix and bone biopsy on 11/24  Superficial cultures with Pseudomonas and Operative Cultures with Coag Neg Staph in fluid media  Discharged on IV Cefepime with plan for 6 week course    On 12/14 labwork was noted to have MISA and Transaminitis  Of note [facility] outpatient labs from 12/9 with Cr of 1.41 and LFT's WNL    U/A here with pyuria, UCx with NGTD  COVID19 PCR Negative  C diff toxin assay indeterminate with Positive C diff PCR  CXR Clear   CT A/P with distal left ureteral calculus without hydronephrosis and Diffuse wall thickening, slightly asymmetric on the left but without prostate abscess.   Renal US with left collecting system fullness but no hydronephrosis    The patient's heel appears without cellulitis or drainage so I doubt that this is the source of his fever.   Given the negative UCx believe we can monitor off of Daptomycin (No VRE growth)    Would continue Meropenem at this point (given prior ESBL E coli recurrent UTI)  Would continue PO Vancomycin  Given the negative UCx believe we can monitor off of Daptomycin (No VRE growth)    Question of AIN raised - urine eosinophils were positive    Overall, C diff infection, Fever, Transaminitis, MISA, Abnormal Urinalysis, Nephrolithiasis, Heel Osteomyelitis, Liver Transplant Recipient    # Acute kidney injury / Abnormal Urinalysis / Nephrolithiasis   - CT abdomen and pelvis showing 5 mm stone   - Renal US with left collecting system fullness but no hydronephrosis  - Urine Eosinophils positive (?AIN)  - Hold bactrim  - UCx with NGTD  - Continue Meropenem 500 mg IV q24 hours    # Fever / Transaminitis  - Daptomycin discontinued  - Continue Meropenem 500 mg IV q24 hours  - Follow up on prelim BCx  - Follow PICC line tip culture  - CMV PCR pending     # C. diff infection  - Continue oral vancomycin 125 mg PO q6 hours    # RLE Heel OM  - Decrease meropenem to 500 mg IV q24 hours  - Monitor fever curve  - Trend CBC daily  - ID will continue to follow    #Liver Transplant Recipient  - Continue Valcyte Twice weekly given drop in renal function     I will continue to follow. Please feel free to contact me with any further questions.    Eusebio Pérez M.D.  [facility] Division of Infectious Disease  8AM-5PM: Pager Number [phone]  After Hours (or if no response): Please contact the Infectious Diseases Office at [phone]

## 2020-12-19 NOTE — PROGRESS NOTE ADULT - ASSESSMENT
65M with PMHx of IDDM, HLD, obesity, HFpEF with mild LV diastolic dysfunction, MGUS, chronic anemia with a history of duodenal ulcer 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  Underwent OLT on 7/2/2020, post op coure c/b VRE bacteremia tx with linezolid and delirium likely in setting of CNI toxicity (FK discontinued/Everolimus initiated 7/27).    Recent admission from 11/20-12/2 for OM of the R heel, s/p debridement with Podiatry, and was discharged with Cefepime x 6 weeks via PICC line (placed 12/1).   Initial wound cx on admission grew Pseudomonas, OR cx grew staph epi.  He now presents with from clinic rising transaminitis and MISA (SCr 3.3 from 1.5)  Hospital course c/b c diff colitis and worsening renal function     [] ID:   - R heal OM: s/p debridement by podiatry last admission; wound healing, no signs on infection. ID following; cont meropenem   - C diff colitis: cont PO vanco; diarrhea improving  - TTE w/o obvious evidence of endocarditis   - CMV +/- On valcyte 450mg BIW (renally adjusted), CMV PCR neg from 12/15  - Cultures: bld/urine negative; PICC line tip negative    [] MISA   - Worsening renal function with assoc uremic encephelopathy.   - Plan for Shiley placement and HD today   - Anemia: start Procrit 10K sq weekly    [] s/p OLT with transaminitis  - Everolimus 1.5mg bid, MMF on hold since last admission in setting of OM; Pred 5mg bid  - PPx: Valcyte  renally dosed, off Bactrim  - Liver doppler with patent vessels   - LFTs trending down    [] DM  - Controlled  - diabetic diet  - cont Lantus 10u qhs/lispro   - cont ISS    [] Dispo:   - appreciate SICU care

## 2020-12-19 NOTE — PROGRESS NOTE ADULT - ASSESSMENT
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 Recent admission from 11/20-12/2 for OM of the R heel, s/p debridement with Podiatry, and was discharged with Cefepime x 6 weeks via PICC line (placed 12/1). Sent to ED from clinic with rising transaminitis and MISA (SCr 3.3 from 1.5). Hospital course complicated by C diff colitis. MISA 2/2 recent cefepime use (for OM) vs dehydration 2/2 c diff. SICU consulted in the setting of elevated creatinine to 6.6 and increased lethargy likely in the setting of uremic encephalopathy. Transferred to SICU for hemodynamic and mental status monitoring with plan for possible HD catheter placement and initiation of HD. Currently no emergent indications for HD.    PLAN:  Neuro: uremic encephalopathy  - Monitor mental status  - Currently not in pain, no pain meds ordered.  - If pain develops, avoid narcotics due to AMS    Resp: no acute issues  - Monitor pulse oximeter, saturating well on room air  - Out of bed to chair, ambulate as tolerated  - Currently protecting airway    CV: HFpEF with mild LV diastolic dysfunction  - Monitor vital signs, currently stable    GI: duodenal ulcer, GAVE, duodenal AVM s/p APC (last on 10/11/19), decompensated JEAN/Cirrhosis s/p OLT   - Consistent carb diet  - Home protonix    Renal: MISA (baseline Cr 1.5)  - Monitor I&Os  - Monitor electrolytes in setting of MISA  - Bicarb gtt (75mEq) @ 75cc/hr + Bicarb 1,300mg PO TID for decreased bicarb in setting of MISA per Transplant Sx  - Currently no emergent indications for HD  - Consider HD catheter placement and initiation of HD as per transplant team    Heme: chronic anemia, MGUS  - Monitor CBC and coags  - HSq for VTE prophylaxis  - Home ASA    ID: immunosuppression in setting of recent OLT, C diff colitis, abnormal UA  - Monitor for clinical evidence of active infection  - Immunosuppression with everolimus and prednisone  - Meropenem for abnormal UA in setting of nephrolithiasis  - Valcyte and nystatin for ppx in setting of immunosuppression  - PO vancomycin for C diff  - Ofloxacin ear drops for Right otitis media per ENT    Endo: hx DM, HLD  - Monitor glucose before meals and at bedtime with ISS  - Insulin 5U premeal and Lantus 10 units at bedtime    Disposition:  - Full code  - SICU

## 2020-12-19 NOTE — PROGRESS NOTE ADULT - SUBJECTIVE AND OBJECTIVE BOX
SICU Daily Progress Note  =====================================================  Interval/Overnight Events:       HPI: 65y Male with PMHx of IDDM, HLD, obesity, HFpEF with mild LV diastolic dysfunction, MGUS, chronic anemia with a history of duodenal ulcer 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  Underwent OLT on 2020, post op course c/b VRE bacteremia tx with linezolid and delirium likely in setting of CNI toxicity (FK discontinued/Everolimus initiated ).    Recent admission from - for OM of the R heel, s/p debridement with Podiatry, and was discharged with Cefepime x 6 weeks via PICC line (placed ). Initial wound cx on admission grew Pseudomonas, OR cx grew staph epi. Was also found with neutropenia and Valcyte was reduced (was on 900mg daily CMV +/-).      Sent to ED from clinic with rising transaminitis and MISA (SCr 3.3 from 1.5). Hospital course complicated by C diff colitis. MISA 2/2 recent cefepime use (for OM) vs dehydration 2/2 c diff.    SICU consulted in the setting of elevated creatinine to 6.6 and increased lethargy likely in the setting of uremic encephalopathy. Transferred to SICU for hemodynamic and mental status monitoring with plan for possible HD catheter placement and initiation of HD. Currently no emergent indications for HD.    Allergies: codeine (Anaphylaxis)    MEDICATIONS:   --------------------------------------------------------------------------------------  Neurologic Medications    Respiratory Medications    Cardiovascular Medications  tamsulosin 0.4 milliGRAM(s) Oral at bedtime    Gastrointestinal Medications  magnesium oxide 400 milliGRAM(s) Oral daily  pantoprazole    Tablet 40 milliGRAM(s) Oral before breakfast  sodium bicarbonate 1300 milliGRAM(s) Oral three times a day  sodium bicarbonate  Infusion 0.05 mEq/kG/Hr IV Continuous <Continuous>    Genitourinary Medications    Hematologic/Oncologic Medications  aspirin  chewable 81 milliGRAM(s) Oral daily  everolimus (ZORTRESS) 1.5 milliGRAM(s) Oral <User Schedule>  heparin   Injectable 5000 Unit(s) SubCutaneous every 12 hours    Antimicrobial/Immunologic Medications  meropenem  IVPB 500 milliGRAM(s) IV Intermittent every 24 hours  nystatin    Suspension 811312 Unit(s) Oral four times a day  valGANciclovir 450 milliGRAM(s) Oral <User Schedule>  vancomycin    Solution 125 milliGRAM(s) Oral every 6 hours    Endocrine/Metabolic Medications  insulin glargine Injectable (LANTUS) 10 Unit(s) SubCutaneous at bedtime  insulin lispro (ADMELOG) corrective regimen sliding scale   SubCutaneous three times a day before meals  insulin lispro (ADMELOG) corrective regimen sliding scale   SubCutaneous at bedtime  insulin lispro Injectable (ADMELOG) 5 Unit(s) SubCutaneous three times a day before meals  predniSONE   Tablet 5 milliGRAM(s) Oral two times a day    Topical/Other Medications  chlorhexidine 2% Cloths 1 Application(s) Topical daily  ofloxacin 0.3% Solution 10 Drop(s) Right Ear two times a day    --------------------------------------------------------------------------------------    VITAL SIGNS, INS/OUTS (last 24 hours):  --------------------------------------------------------------------------------------  T(C): 37.1 (20 @ 00:00), Max: 37.1 (20 @ 00:00)  HR: 91 (20 @ 00:00) (75 - 91)  BP: 129/68 (20 @ 00:00) (118/81 - 153/76)  RR: 19 (20 @ 00:00) (16 - 20)  SpO2: 92% (20 @ 00:00) (92% - 98%)    20 @ 07:01  -  20 @ 07:00  --------------------------------------------------------  IN: 420 mL / OUT: 1355 mL / NET: -935 mL    20 @ 07:01  -  20 @ 01:16  --------------------------------------------------------  IN: 225 mL / OUT: 400 mL / NET: -175 mL  --------------------------------------------------------------------------------------  EXAM  NEUROLOGY  Exam: Normal, NAD, alert, oriented x3, no focal deficits.    HEENT  Exam: Normocephalic, atraumatic, EOMI.     RESPIRATORY  Exam: Normal expansion/effort.  Mechanical Ventilation:     CARDIOVASCULAR  Exam: Regular rate and rhythm.       GI/NUTRITION  Exam: Abdomen soft, Non-tender, Distended.     VASCULAR  Exam: Extremities warm, pink, well-perfused.     MUSCULOSKELETAL  Exam: All extremities moving spontaneously without limitations.     SKIN  Exam: Good skin turgor, no skin breakdown.     LABS  --------------------------------------------------------------------------------------                        8.3    6.52  )-----------( 188      ( 18 Dec 2020 06:18 )             25.9   12-18    137  |  105  |  90<H>  ----------------------------<  186<H>  4.4   |  15<L>  |  6.63<H>    Ca    8.4      18 Dec 2020 16:14  Phos  4.7     18  Mg     2.4     18    TPro  6.0  /  Alb  2.8<L>  /  TBili  0.1<L>  /  DBili  x   /  AST  43<H>  /  ALT  61<H>  /  AlkPhos  252<H>  18  Urinalysis Basic - ( 17 Dec 2020 16:54 )    Color: Light Yellow / Appearance: Slightly Turbid / S.012 / pH: x  Gluc: x / Ketone: Negative  / Bili: Negative / Urobili: Negative   Blood: x / Protein: 100 mg/dL / Nitrite: Negative   Leuk Esterase: Large / RBC: 3 /hpf /  /HPF   Sq Epi: x / Non Sq Epi: 0 /hpf / Bacteria: Negative    PT/INR - ( 18 Dec 2020 06:18 )   PT: 14.3 sec;   INR: 1.20 ratio         PTT - ( 18 Dec 2020 06:18 )  PTT:27.5 sec  --------------------------------------------------------------------------------------

## 2020-12-19 NOTE — PROGRESS NOTE ADULT - SUBJECTIVE AND OBJECTIVE BOX
Follow Up:  s/p OLT, CDI, ?UTI    Interval History:    REVIEW OF SYSTEMS  [  ] ROS unobtainable because:    [  ] All other systems negative except as noted below    Constitutional:  [ ] fever [ ] chills  [ ] weight loss  [ ] weakness  Skin:  [ ] rash [ ] phlebitis	  Eyes: [ ] icterus [ ] pain  [ ] discharge	  ENMT: [ ] sore throat  [ ] thrush [ ] ulcers [ ] exudates  Respiratory: [ ] dyspnea [ ] hemoptysis [ ] cough [ ] sputum	  Cardiovascular:  [ ] chest pain [ ] palpitations [ ] edema	  Gastrointestinal:  [ ] nausea [ ] vomiting [ ] diarrhea [ ] constipation [ ] pain	  Genitourinary:  [ ] dysuria [ ] frequency [ ] hematuria [ ] discharge [ ] flank pain  [ ] incontinence  Musculoskeletal:  [ ] myalgias [ ] arthralgias [ ] arthritis  [ ] back pain  Neurological:  [ ] headache [ ] seizures  [ ] confusion/altered mental status    Allergies  codeine (Anaphylaxis)        ANTIMICROBIALS:  meropenem  IVPB 500 every 24 hours  nystatin    Suspension 446527 four times a day  valGANciclovir 450 <User Schedule>  vancomycin    Solution 125 every 6 hours      OTHER MEDS:  MEDICATIONS  (STANDING):  aspirin  chewable 81 daily  epoetin barry-epbx (RETACRIT) Injectable 33435 every 7 days  everolimus (ZORTRESS) 1.5 <User Schedule>  heparin   Injectable 5000 every 12 hours  insulin glargine Injectable (LANTUS) 10 at bedtime  insulin lispro (ADMELOG) corrective regimen sliding scale  three times a day before meals  insulin lispro (ADMELOG) corrective regimen sliding scale  at bedtime  insulin lispro Injectable (ADMELOG) 5 three times a day before meals  pantoprazole    Tablet 40 before breakfast  predniSONE   Tablet 5 two times a day  tamsulosin 0.4 at bedtime      Vital Signs Last 24 Hrs  T(C): 36.5 (19 Dec 2020 15:00), Max: 37.2 (19 Dec 2020 04:00)  T(F): 97.7 (19 Dec 2020 15:00), Max: 99 (19 Dec 2020 04:00)  HR: 75 (19 Dec 2020 15:00) (75 - 91)  BP: 148/68 (19 Dec 2020 15:00) (118/81 - 163/70)  BP(mean): 95 (19 Dec 2020 15:00) (90 - 106)  RR: 20 (19 Dec 2020 15:00) (16 - 20)  SpO2: 96% (19 Dec 2020 15:00) (92% - 98%)    PHYSICAL EXAMINATION:  General: Alert and Awake, NAD  HEENT: PERRL, EOMI  Neck: Supple  Cardiac: RRR, No M/R/G  Resp: CTAB, No Wh/Rh/Ra  Abdomen: NBS, NT/ND, No HSM, No rigidity or guarding  MSK: No LE edema. No Calf tenderness  : No bob  Skin: No rashes or lesions. Skin is warm and dry to the touch.   Neuro: Alert and Awake. CN 2-12 Grossly intact. Moves all four extremities spontaneously.  Psych: Calm, Pleasant, Cooperative                          7.4    4.50  )-----------( 179      ( 19 Dec 2020 05:29 )             22.9       12-19    133<L>  |  101  |  92<H>  ----------------------------<  224<H>  4.7   |  16<L>  |  6.94<H>    Ca    8.2<L>      19 Dec 2020 05:29  Phos  4.6     12-19  Mg     2.2     12-19    TPro  5.8<L>  /  Alb  2.6<L>  /  TBili  0.1<L>  /  DBili  x   /  AST  31  /  ALT  47<H>  /  AlkPhos  222<H>  12-19          MICROBIOLOGY:  v  .Urine Catheterized  12-17-20   No growth  --  --      .Catheter PICC line tip  12-15-20   < 15 colonies Coag Negative Staphylococcus  --  --      .Urine Clean Catch (Midstream)  12-15-20   <10,000 CFU/mL Normal Urogenital Shantel  --  --      .Blood PICC/PERC Single Lumen  12-14-20   No growth to date.  --  --      .Tissue Other  11-25-20   Growth in fluid media only Staphylococcus epidermidis  "Susceptibilities not performed"  --  Staphylococcus epidermidis      Skin wound  11-21-20   Few Pseudomonas aeruginosa  Normal skin shantel isolated  --  Pseudomonas aeruginosa      .Blood Blood-Peripheral  11-20-20   No Growth Final  --  --          Rapid RVP Result: NotDetec (12-17 @ 18:36)  C Diff by PCR Result: Detected (12-15 @ 14:53)    C Diff by PCR Result: Detected (12-15-20 @ 14:53)  Clostridium difficile GDH Toxins A&amp;B, EIA:   Indeterminate (12-15-20 @ 11:23)  Clostridium difficile GDH Interpretation: This specimen is positive for C. Difficile glutamate dehydrogenase (GDH)  antigen and negative for C. Difficile Toxins A & B, by EIA.  GDH is a  highly sensitive screening marker for C. Difficile that is produced in  large amounts by all C. Difficilestrains, both toxigenic and  nontoxigenic.  Specimens that are GDH positive and toxin negative will be  tested further by PCR to detect toxin gene sequences associated with  toxin producing C. Difficle. (12-15-20 @ 11:23)      RADIOLOGY:    <The imaging below has been reviewed and visualized by me independently. Findings as detailed in report below> Follow Up:  s/p OLT, CDI, ?UTI    Interval History: moved with 8U for HD. denies diarrhea. denies pain.;     REVIEW OF SYSTEMS  [  ] ROS unobtainable because:    [ x ] All other systems negative except as noted below    Constitutional:  [ ] fever [ ] chills  [ ] weight loss  [ ] weakness  Skin:  [ ] rash [ ] phlebitis	  Eyes: [ ] icterus [ ] pain  [ ] discharge	  ENMT: [ ] sore throat  [ ] thrush [ ] ulcers [ ] exudates  Respiratory: [ ] dyspnea [ ] hemoptysis [ ] cough [ ] sputum	  Cardiovascular:  [ ] chest pain [ ] palpitations [ ] edema	  Gastrointestinal:  [ ] nausea [ ] vomiting [ ] diarrhea [ ] constipation [ ] pain	  Genitourinary:  [ ] dysuria [ ] frequency [ ] hematuria [ ] discharge [ ] flank pain  [ ] incontinence  Musculoskeletal:  [ ] myalgias [ ] arthralgias [ ] arthritis  [ ] back pain  Neurological:  [ ] headache [ ] seizures  [ ] confusion/altered mental status    Allergies  codeine (Anaphylaxis)        ANTIMICROBIALS:  meropenem  IVPB 500 every 24 hours  nystatin    Suspension 824141 four times a day  valGANciclovir 450 <User Schedule>  vancomycin    Solution 125 every 6 hours      OTHER MEDS:  MEDICATIONS  (STANDING):  aspirin  chewable 81 daily  epoetin barry-epbx (RETACRIT) Injectable 47444 every 7 days  everolimus (ZORTRESS) 1.5 <User Schedule>  heparin   Injectable 5000 every 12 hours  insulin glargine Injectable (LANTUS) 10 at bedtime  insulin lispro (ADMELOG) corrective regimen sliding scale  three times a day before meals  insulin lispro (ADMELOG) corrective regimen sliding scale  at bedtime  insulin lispro Injectable (ADMELOG) 5 three times a day before meals  pantoprazole    Tablet 40 before breakfast  predniSONE   Tablet 5 two times a day  tamsulosin 0.4 at bedtime      Vital Signs Last 24 Hrs  T(C): 36.5 (19 Dec 2020 15:00), Max: 37.2 (19 Dec 2020 04:00)  T(F): 97.7 (19 Dec 2020 15:00), Max: 99 (19 Dec 2020 04:00)  HR: 75 (19 Dec 2020 15:00) (75 - 91)  BP: 148/68 (19 Dec 2020 15:00) (118/81 - 163/70)  BP(mean): 95 (19 Dec 2020 15:00) (90 - 106)  RR: 20 (19 Dec 2020 15:00) (16 - 20)  SpO2: 96% (19 Dec 2020 15:00) (92% - 98%)    PHYSICAL EXAMINATION:  General: Alert and Awake, NAD  HEENT: PERRL, EOMI  Neck: Supple  Cardiac: RRR, No M/R/G  Resp: CTAB, No Wh/Rh/Ra  Abdomen: NBS, NT/ND, No HSM, No rigidity or guarding  MSK: +RLE foot with overlying dressing. No LE edema. No Calf tenderness  : No bob  Skin: No rashes or lesions. Skin is warm and dry to the touch.   Neuro: Alert and Awake. CN 2-12 Grossly intact. Moves all four extremities spontaneously.  Psych: Calm, Pleasant, Cooperative                            7.4    4.50  )-----------( 179      ( 19 Dec 2020 05:29 )             22.9       12-19    133<L>  |  101  |  92<H>  ----------------------------<  224<H>  4.7   |  16<L>  |  6.94<H>    Ca    8.2<L>      19 Dec 2020 05:29  Phos  4.6     12-19  Mg     2.2     12-19    TPro  5.8<L>  /  Alb  2.6<L>  /  TBili  0.1<L>  /  DBili  x   /  AST  31  /  ALT  47<H>  /  AlkPhos  222<H>  12-19          MICROBIOLOGY:  v  .Urine Catheterized  12-17-20   No growth  --  --      .Catheter PICC line tip  12-15-20   < 15 colonies Coag Negative Staphylococcus  --  --      .Urine Clean Catch (Midstream)  12-15-20   <10,000 CFU/mL Normal Urogenital Shantel  --  --      .Blood PICC/PERC Single Lumen  12-14-20   No growth to date.  --  --      .Tissue Other  11-25-20   Growth in fluid media only Staphylococcus epidermidis  "Susceptibilities not performed"  --  Staphylococcus epidermidis      Skin wound  11-21-20   Few Pseudomonas aeruginosa  Normal skin shantel isolated  --  Pseudomonas aeruginosa      .Blood Blood-Peripheral  11-20-20   No Growth Final  --  --          Rapid RVP Result: NotDetec (12-17 @ 18:36)  C Diff by PCR Result: Detected (12-15 @ 14:53)    C Diff by PCR Result: Detected (12-15-20 @ 14:53)  Clostridium difficile GDH Toxins A&amp;B, EIA:   Indeterminate (12-15-20 @ 11:23)  Clostridium difficile GDH Interpretation: This specimen is positive for C. Difficile glutamate dehydrogenase (GDH)  antigen and negative for C. Difficile Toxins A & B, by EIA.  GDH is a  highly sensitive screening marker for C. Difficile that is produced in  large amounts by all C. Difficilestrains, both toxigenic and  nontoxigenic.  Specimens that are GDH positive and toxin negative will be  tested further by PCR to detect toxin gene sequences associated with  toxin producing C. Difficle. (12-15-20 @ 11:23)      RADIOLOGY:    <The imaging below has been reviewed and visualized by me independently. Findings as detailed in report below>    EXAM:  US KIDNEYS AND BLADDER                        PROCEDURE DATE:  12/16/2020    Fullness of the left collecting system.  Left upper quadrant ascites.      EXAM:  CT ABDOMEN AND PELVIS                        PROCEDURE DATE:  12/15/2020    A 5 mm distal left ureteral calculus. No associated hydronephrosis.  Diffuse wall thickening, slightly asymmetric on the left may be related to urinary tract infection. Consider follow-up cystoscopy.  No evidence of a prostatic abscess as questioned.

## 2020-12-19 NOTE — PROGRESS NOTE ADULT - ASSESSMENT
64 year old man type 2 diabetes, HLD, GERD, HFpEF with mild LV diastolic dysfunction, and decompensated JEAN cirrhosis, duodenal ulcer, GAVE and duodenal AVM s/p APC (last on 10/11/19) and SHENG who is now s/p liver transplant (7/2/20) with post-op course c/b tacrolimus toxicity and hospital delirium improving after stopping CNI and replacing w/ everolimus. Patient recently presented 11/20/20 with worsening LE swelling and right foot wound with wound culture 11/24 growing S. epidermidis and calcaneus bone biopsy culture 11/24 demonstrating osteomyelitis sent home with cefepime. Patient now with sepsis and MISA likely secondary to cdiff.    Impression:   # Cdiff: on po vanco now. WBC wnl. Diarrhea resolved  # MISA: Remains nonoliguric MISA. Kidney slightly enlarged on u/s- may suggest AIN. nonoliguric. FeNa and FeUrea suggest possible AIN (from cefepime) vs acute tubular necrosis likely from cdiff and volume loss.  # Right foot osteomyelitis:  reviewed by podiatry, appears stable. s/p surgical debridement and graft placement on 11/24 by Podiatry. Wound culture grew pseudomonas and bone bx on 11/24 growing S. epidermidis - likely contaminant. Had PICC line, now removed. On meropenem  # Otitis externa: on floxin drops  # S/p liver transplant 7/2/20: Transaminases and INR mildly elevated likely in setting of infection.   Recipient Info:   ABO: A  CMV:  Neg  EBV Positive  Donor:  Donor ID:  [id number] Match: [id number]  Age: 29 ABO:  A1 High Risk:   No COD: Cardiovascular  Anti CMV positive, EBV IgG- positive  HepBcAb-neg, Hepatitis C-DANA- neg, Hepatitis C ab-neg  # Bladder wall thickening  # Type 2 diabetes  # SHENG     Recommendations:  - continue with po vanco  - continue with floxin drops per ENT  - continue with meropenem  - HD per renal  - bob for strict I/Os  - prednisone dosing per transplant surgery and renal reccs  - appreciate transplant ID, renal, surgery reccs  - hold bactrim given MISA  - immunosuppression per transplant surgery; continue with everolimus and prednisone  - monitor CBC, CMP, INR  - transplant hepatology to follow    Thank you for involving us in the care of this patient. Please reach out if any further questions.    Adonis Rubio, PGY-4  Hepatology Fellow    Available on [employer] Teams  Pager [phone] ([facility]) or [phone] ([facility])  After 5PM/Weekends, please contact the on-call GI fellow: [phone]  Available through [employer] Teams     64 year old man type 2 diabetes, HLD, GERD, HFpEF with mild LV diastolic dysfunction, and decompensated JEAN cirrhosis, duodenal ulcer, GAVE and duodenal AVM s/p APC (last on 10/11/19) and SHENG who is now s/p liver transplant (7/2/20) with post-op course c/b tacrolimus toxicity and hospital delirium improving after stopping CNI and replacing w/ everolimus. Patient recently presented 11/20/20 with worsening LE swelling and right foot wound with wound culture 11/24 growing S. epidermidis and calcaneus bone biopsy culture 11/24 demonstrating osteomyelitis sent home with cefepime. Patient now with sepsis and MISA likely secondary to cdiff.    Impression:   # Cdiff: on po vanco now. WBC wnl. Diarrhea resolved  # MISA: Remains nonoliguric MISA. Kidney slightly enlarged on u/s- may suggest AIN. nonoliguric. FeNa and FeUrea suggest possible AIN (from cefepime) vs acute tubular necrosis likely from cdiff and volume loss.  # Right foot osteomyelitis:  reviewed by podiatry, appears stable. s/p surgical debridement and graft placement on 11/24 by Podiatry. Wound culture grew pseudomonas and bone bx on 11/24 growing S. epidermidis - likely contaminant. Had PICC line, now removed. On meropenem  # Otitis externa: on floxin drops  # S/p liver transplant 7/2/20: Transaminases and INR mildly elevated likely in setting of infection.   Recipient Info:   ABO: A  CMV:  Neg  EBV Positive  Donor:  Donor ID:  [id number] Match: [id number]  Age: 29 ABO:  A1 High Risk:   No COD: Cardiovascular  Anti CMV positive, EBV IgG- positive  HepBcAb-neg, Hepatitis C-DANA- neg, Hepatitis C ab-neg  # Bladder wall thickening  # Type 2 diabetes  # SHENG     Recommendations:  - continue with po vanco  - continue with floxin drops per ENT  - continue with meropenem  - HD per renal  - bob for strict I/Os  - immunosuppressives of everolimus and prednisone per transplant surgery  - appreciate transplant ID, renal, surgery reccs  - hold bactrim given MISA  - monitor CBC, CMP, INR  - transplant hepatology to follow    Thank you for involving us in the care of this patient. Please reach out if any further questions.    Adonis Rubio, PGY-4  Hepatology Fellow    Available on [employer] Teams  Pager [phone] ([facility]) or [phone] ([facility])  After 5PM/Weekends, please contact the on-call GI fellow: [phone]  Available through [employer] Teams

## 2020-12-19 NOTE — PROGRESS NOTE ADULT - ASSESSMENT
65 yr old man with liver transplant in July 2020, right plantar osteo s/p debridement on IV cefepime for ~2 weeks.   He is admitted with MISA. Serum creatinine was 1.3-1.5mg/dL on previous admission few weeks ago.   Stool is +ve for C.diff but diarrhea has now subsided. U/A with leukocytes on admission but cultures remain negative.   Non obstructive left distal ureteric stone on CT. U/S with enlarged kidneys, no olga hydro   Right foot osteo s/p graft, looks clean and not infected.     MISA due to AIN from cefepime vs ATN in the setting of sepsis.   BUN/Cr continue to rise 92/6.94 today, he has worsening metabolic acidosis. Remainder of electrolytes are fair and no signs of fluid overload.   He does have mild uremic symptoms - waxing and waning metal status and metallic taste and nose bleeding. In view of symptoms and worsening azotemia will perform hemodialysis today. No ultrafiltration.  Give retacrit 10,000 units sq weekly for anemia

## 2020-12-19 NOTE — PROCEDURE NOTE - NSTIMEOUT_GEN_A_CORE
Use Enhanced Medication Counseling?: No Minocycline Counseling: Patient advised regarding possible photosensitivity and discoloration of the teeth, skin, lips, tongue and gums. Patient instructed to avoid sunlight, if possible. When exposed to sunlight, patients should wear protective clothing, sunglasses, and sunscreen. The patient was instructed to call the office immediately if the following severe adverse effects occur:  hearing changes, easy bruising/bleeding, severe headache, or vision changes. The patient verbalized understanding of the proper use and possible adverse effects of minocycline. All of the patient's questions and concerns were addressed. High Dose Vitamin A Pregnancy And Lactation Text: High dose vitamin A therapy is contraindicated during pregnancy and breast feeding. Patient's first and last name, , procedure, and correct site confirmed prior to the start of procedure. Detail Level: Zone Benzoyl Peroxide Counseling: Patient counseled that medicine may cause skin irritation and bleach clothing. In the event of skin irritation, the patient was advised to reduce the amount of the drug applied or use it less frequently. The patient verbalized understanding of the proper use and possible adverse effects of benzoyl peroxide. All of the patient's questions and concerns were addressed. Spironolactone Pregnancy And Lactation Text: This medication can cause feminization of the male fetus and should be avoided during pregnancy. The active metabolite is also found in breast milk. Tetracycline Pregnancy And Lactation Text: This medication is Pregnancy Category D and not consider safe during pregnancy. It is also excreted in breast milk. Azithromycin Counseling:  I discussed with the patient the risks of azithromycin including but not limited to GI upset, allergic reaction, drug rash, diarrhea, and yeast infections. Erythromycin Counseling:  I discussed with the patient the risks of erythromycin including but not limited to GI upset, allergic reaction, drug rash, diarrhea, increase in liver enzymes, and yeast infections. Doxycycline Counseling:  Patient counseled regarding possible photosensitivity and increased risk for sunburn. Patient instructed to avoid sunlight, if possible. When exposed to sunlight, patients should wear protective clothing, sunglasses, and sunscreen. The patient was instructed to call the office immediately if the following severe adverse effects occur:  hearing changes, easy bruising/bleeding, severe headache, or vision changes. The patient verbalized understanding of the proper use and possible adverse effects of doxycycline. All of the patient's questions and concerns were addressed. Isotretinoin Counseling: Patient should get monthly blood tests, not donate blood, not drive at night if vision affected, not share medication, and not undergo elective surgery for 6 months after tx completed. Side effects reviewed, pt to contact office should one occur. Dapsone Pregnancy And Lactation Text: This medication is Pregnancy Category C and is not considered safe during pregnancy or breast feeding. Topical Sulfur Applications Pregnancy And Lactation Text: This medication is Pregnancy Category C and has an unknown safety profile during pregnancy. It is unknown if this topical medication is excreted in breast milk. Tazorac Pregnancy And Lactation Text: This medication is not safe during pregnancy. It is unknown if this medication is excreted in breast milk. Benzoyl Peroxide Pregnancy And Lactation Text: This medication is Pregnancy Category C. It is unknown if benzoyl peroxide is excreted in breast milk. Topical Retinoid Pregnancy And Lactation Text: This medication is Pregnancy Category C. It is unknown if this medication is excreted in breast milk. High Dose Vitamin A Counseling: Side effects reviewed, pt to contact office should one occur. Birth Control Pills Counseling: Birth Control Pill Counseling: I discussed with the patient the potential side effects of OCPs including but not limited to increased risk of stroke, heart attack, thrombophlebitis, deep venous thrombosis, hepatic adenomas, breast changes, GI upset, headaches, and depression. The patient verbalized understanding of the proper use and possible adverse effects of OCPs. All of the patient's questions and concerns were addressed. Topical Clindamycin Pregnancy And Lactation Text: This medication is Pregnancy Category B and is considered safe during pregnancy. It is unknown if it is excreted in breast milk. Tazorac Counseling:  Patient advised that medication is irritating and drying. Patient may need to apply sparingly and wash off after an hour before eventually leaving it on overnight. The patient verbalized understanding of the proper use and possible adverse effects of tazorac. All of the patient's questions and concerns were addressed. Doxycycline Pregnancy And Lactation Text: This medication is Pregnancy Category D and not consider safe during pregnancy. It is also excreted in breast milk but is considered safe for shorter treatment courses. Spironolactone Counseling: Patient advised regarding risks of diarrhea, abdominal pain, hyperkalemia, birth defects (for female patients), liver toxicity and renal toxicity. The patient may need blood work to monitor liver and kidney function and potassium levels while on therapy. The patient verbalized understanding of the proper use and possible adverse effects of spironolactone. All of the patient's questions and concerns were addressed. Topical Sulfur Applications Counseling: Topical Sulfur Counseling: Patient counseled that this medication may cause skin irritation or allergic reactions. In the event of skin irritation, the patient was advised to reduce the amount of the drug applied or use it less frequently. The patient verbalized understanding of the proper use and possible adverse effects of topical sulfur application. All of the patient's questions and concerns were addressed. Birth Control Pills Pregnancy And Lactation Text: This medication should be avoided if pregnant and for the first 30 days post-partum. Isotretinoin Pregnancy And Lactation Text: This medication is Pregnancy Category X and is considered extremely dangerous during pregnancy. It is unknown if it is excreted in breast milk. Tetracycline Counseling: Patient counseled regarding possible photosensitivity and increased risk for sunburn. Patient instructed to avoid sunlight, if possible. When exposed to sunlight, patients should wear protective clothing, sunglasses, and sunscreen. The patient was instructed to call the office immediately if the following severe adverse effects occur:  hearing changes, easy bruising/bleeding, severe headache, or vision changes. The patient verbalized understanding of the proper use and possible adverse effects of tetracycline. All of the patient's questions and concerns were addressed. Patient understands to avoid pregnancy while on therapy due to potential birth defects. Bactrim Pregnancy And Lactation Text: This medication is Pregnancy Category D and is known to cause fetal risk. It is also excreted in breast milk. Bactrim Counseling:  I discussed with the patient the risks of sulfa antibiotics including but not limited to GI upset, allergic reaction, drug rash, diarrhea, dizziness, photosensitivity, and yeast infections. Rarely, more serious reactions can occur including but not limited to aplastic anemia, agranulocytosis, methemoglobinemia, blood dyscrasias, liver or kidney failure, lung infiltrates or desquamative/blistering drug rashes. Azithromycin Pregnancy And Lactation Text: This medication is considered safe during pregnancy and is also secreted in breast milk. Dapsone Counseling: I discussed with the patient the risks of dapsone including but not limited to hemolytic anemia, agranulocytosis, rashes, methemoglobinemia, kidney failure, peripheral neuropathy, headaches, GI upset, and liver toxicity. Patients who start dapsone require monitoring including baseline LFTs and weekly CBCs for the first month, then every month thereafter. The patient verbalized understanding of the proper use and possible adverse effects of dapsone. All of the patient's questions and concerns were addressed. Topical Clindamycin Counseling: Patient counseled that this medication may cause skin irritation or allergic reactions. In the event of skin irritation, the patient was advised to reduce the amount of the drug applied or use it less frequently. The patient verbalized understanding of the proper use and possible adverse effects of clindamycin. All of the patient's questions and concerns were addressed. Topical Retinoid counseling:  Patient advised to apply a pea-sized amount only at bedtime and wait 30 minutes after washing their face before applying. If too drying, patient may add a non-comedogenic moisturizer. The patient verbalized understanding of the proper use and possible adverse effects of retinoids. All of the patient's questions and concerns were addressed. Erythromycin Pregnancy And Lactation Text: This medication is Pregnancy Category B and is considered safe during pregnancy. It is also excreted in breast milk.

## 2020-12-19 NOTE — PROGRESS NOTE ADULT - ATTENDING COMMENTS
Patient was seen and examined with hepatology team on rounds on 12/19/2020. Diarrhea improving, and he is starting dialysis today.

## 2020-12-20 LAB
ALBUMIN SERPL ELPH-MCNC: 2.7 G/DL — LOW (ref 3.3–5)
ALBUMIN SERPL ELPH-MCNC: 2.9 G/DL — LOW (ref 3.3–5)
ALP SERPL-CCNC: 206 U/L — HIGH (ref 40–120)
ALP SERPL-CCNC: 217 U/L — HIGH (ref 40–120)
ALT FLD-CCNC: 37 U/L — SIGNIFICANT CHANGE UP (ref 10–45)
ALT FLD-CCNC: 41 U/L — SIGNIFICANT CHANGE UP (ref 10–45)
AMMONIA BLD-MCNC: 29 UMOL/L — SIGNIFICANT CHANGE UP (ref 11–55)
ANION GAP SERPL CALC-SCNC: 13 MMOL/L — SIGNIFICANT CHANGE UP (ref 5–17)
ANION GAP SERPL CALC-SCNC: 16 MMOL/L — SIGNIFICANT CHANGE UP (ref 5–17)
APPEARANCE UR: ABNORMAL
APTT BLD: 28.2 SEC — SIGNIFICANT CHANGE UP (ref 27.5–35.5)
APTT BLD: 29.8 SEC — SIGNIFICANT CHANGE UP (ref 27.5–35.5)
AST SERPL-CCNC: 32 U/L — SIGNIFICANT CHANGE UP (ref 10–40)
AST SERPL-CCNC: 34 U/L — SIGNIFICANT CHANGE UP (ref 10–40)
BASOPHILS # BLD AUTO: 0.01 K/UL — SIGNIFICANT CHANGE UP (ref 0–0.2)
BASOPHILS NFR BLD AUTO: 0.2 % — SIGNIFICANT CHANGE UP (ref 0–2)
BILIRUB SERPL-MCNC: 0.2 MG/DL — SIGNIFICANT CHANGE UP (ref 0.2–1.2)
BILIRUB SERPL-MCNC: 0.2 MG/DL — SIGNIFICANT CHANGE UP (ref 0.2–1.2)
BILIRUB UR-MCNC: NEGATIVE — SIGNIFICANT CHANGE UP
BUN SERPL-MCNC: 71 MG/DL — HIGH (ref 7–23)
BUN SERPL-MCNC: 72 MG/DL — HIGH (ref 7–23)
CALCIUM SERPL-MCNC: 8.3 MG/DL — LOW (ref 8.4–10.5)
CALCIUM SERPL-MCNC: 8.3 MG/DL — LOW (ref 8.4–10.5)
CHLORIDE SERPL-SCNC: 100 MMOL/L — SIGNIFICANT CHANGE UP (ref 96–108)
CHLORIDE SERPL-SCNC: 102 MMOL/L — SIGNIFICANT CHANGE UP (ref 96–108)
CO2 SERPL-SCNC: 21 MMOL/L — LOW (ref 22–31)
CO2 SERPL-SCNC: 22 MMOL/L — SIGNIFICANT CHANGE UP (ref 22–31)
COLOR SPEC: YELLOW — SIGNIFICANT CHANGE UP
CREAT SERPL-MCNC: 5.45 MG/DL — HIGH (ref 0.5–1.3)
CREAT SERPL-MCNC: 5.62 MG/DL — HIGH (ref 0.5–1.3)
CULTURE RESULTS: SIGNIFICANT CHANGE UP
DIFF PNL FLD: ABNORMAL
EOSINOPHIL # BLD AUTO: 0.03 K/UL — SIGNIFICANT CHANGE UP (ref 0–0.5)
EOSINOPHIL NFR BLD AUTO: 0.7 % — SIGNIFICANT CHANGE UP (ref 0–6)
EVEROLIMUS, WHOLE BLOOD RESULT: 3.4 NG/ML — SIGNIFICANT CHANGE UP (ref 3–8)
EVEROLIMUS, WHOLE BLOOD RESULT: 7.1 NG/ML — SIGNIFICANT CHANGE UP (ref 3–8)
GLUCOSE BLDC GLUCOMTR-MCNC: 182 MG/DL — HIGH (ref 70–99)
GLUCOSE BLDC GLUCOMTR-MCNC: 222 MG/DL — HIGH (ref 70–99)
GLUCOSE BLDC GLUCOMTR-MCNC: 241 MG/DL — HIGH (ref 70–99)
GLUCOSE BLDC GLUCOMTR-MCNC: 268 MG/DL — HIGH (ref 70–99)
GLUCOSE BLDC GLUCOMTR-MCNC: 282 MG/DL — HIGH (ref 70–99)
GLUCOSE SERPL-MCNC: 234 MG/DL — HIGH (ref 70–99)
GLUCOSE SERPL-MCNC: 271 MG/DL — HIGH (ref 70–99)
GLUCOSE UR QL: ABNORMAL
HCT VFR BLD CALC: 22.4 % — LOW (ref 39–50)
HCT VFR BLD CALC: 25.3 % — LOW (ref 39–50)
HGB BLD-MCNC: 7.5 G/DL — LOW (ref 13–17)
HGB BLD-MCNC: 8.1 G/DL — LOW (ref 13–17)
IMM GRANULOCYTES NFR BLD AUTO: 1.4 % — SIGNIFICANT CHANGE UP (ref 0–1.5)
INR BLD: 1.05 RATIO — SIGNIFICANT CHANGE UP (ref 0.88–1.16)
INR BLD: 1.15 RATIO — SIGNIFICANT CHANGE UP (ref 0.88–1.16)
KETONES UR-MCNC: NEGATIVE — SIGNIFICANT CHANGE UP
LEUKOCYTE ESTERASE UR-ACNC: ABNORMAL
LYMPHOCYTES # BLD AUTO: 0.57 K/UL — LOW (ref 1–3.3)
LYMPHOCYTES # BLD AUTO: 13.3 % — SIGNIFICANT CHANGE UP (ref 13–44)
MAGNESIUM SERPL-MCNC: 2 MG/DL — SIGNIFICANT CHANGE UP (ref 1.6–2.6)
MAGNESIUM SERPL-MCNC: 2.2 MG/DL — SIGNIFICANT CHANGE UP (ref 1.6–2.6)
MCHC RBC-ENTMCNC: 28.8 PG — SIGNIFICANT CHANGE UP (ref 27–34)
MCHC RBC-ENTMCNC: 29.2 PG — SIGNIFICANT CHANGE UP (ref 27–34)
MCHC RBC-ENTMCNC: 32 GM/DL — SIGNIFICANT CHANGE UP (ref 32–36)
MCHC RBC-ENTMCNC: 33.5 GM/DL — SIGNIFICANT CHANGE UP (ref 32–36)
MCV RBC AUTO: 87.2 FL — SIGNIFICANT CHANGE UP (ref 80–100)
MCV RBC AUTO: 90 FL — SIGNIFICANT CHANGE UP (ref 80–100)
MONOCYTES # BLD AUTO: 0.41 K/UL — SIGNIFICANT CHANGE UP (ref 0–0.9)
MONOCYTES NFR BLD AUTO: 9.6 % — SIGNIFICANT CHANGE UP (ref 2–14)
NEUTROPHILS # BLD AUTO: 3.21 K/UL — SIGNIFICANT CHANGE UP (ref 1.8–7.4)
NEUTROPHILS NFR BLD AUTO: 74.8 % — SIGNIFICANT CHANGE UP (ref 43–77)
NITRITE UR-MCNC: NEGATIVE — SIGNIFICANT CHANGE UP
NRBC # BLD: 0 /100 WBCS — SIGNIFICANT CHANGE UP (ref 0–0)
NRBC # BLD: 0 /100 WBCS — SIGNIFICANT CHANGE UP (ref 0–0)
PH UR: 6 — SIGNIFICANT CHANGE UP (ref 5–8)
PHOSPHATE SERPL-MCNC: 4.2 MG/DL — SIGNIFICANT CHANGE UP (ref 2.5–4.5)
PHOSPHATE SERPL-MCNC: 4.3 MG/DL — SIGNIFICANT CHANGE UP (ref 2.5–4.5)
PLATELET # BLD AUTO: 170 K/UL — SIGNIFICANT CHANGE UP (ref 150–400)
PLATELET # BLD AUTO: 185 K/UL — SIGNIFICANT CHANGE UP (ref 150–400)
POTASSIUM SERPL-MCNC: 4.3 MMOL/L — SIGNIFICANT CHANGE UP (ref 3.5–5.3)
POTASSIUM SERPL-MCNC: 4.6 MMOL/L — SIGNIFICANT CHANGE UP (ref 3.5–5.3)
POTASSIUM SERPL-SCNC: 4.3 MMOL/L — SIGNIFICANT CHANGE UP (ref 3.5–5.3)
POTASSIUM SERPL-SCNC: 4.6 MMOL/L — SIGNIFICANT CHANGE UP (ref 3.5–5.3)
PROCALCITONIN SERPL-MCNC: 3.05 NG/ML — HIGH (ref 0.02–0.1)
PROT SERPL-MCNC: 5.7 G/DL — LOW (ref 6–8.3)
PROT SERPL-MCNC: 5.9 G/DL — LOW (ref 6–8.3)
PROT UR-MCNC: 100 — SIGNIFICANT CHANGE UP
PROTHROM AB SERPL-ACNC: 12.6 SEC — SIGNIFICANT CHANGE UP (ref 10.6–13.6)
PROTHROM AB SERPL-ACNC: 13.7 SEC — HIGH (ref 10.6–13.6)
RAPID RVP RESULT: SIGNIFICANT CHANGE UP
RBC # BLD: 2.57 M/UL — LOW (ref 4.2–5.8)
RBC # BLD: 2.81 M/UL — LOW (ref 4.2–5.8)
RBC # FLD: 13.9 % — SIGNIFICANT CHANGE UP (ref 10.3–14.5)
RBC # FLD: 14 % — SIGNIFICANT CHANGE UP (ref 10.3–14.5)
SARS-COV-2 RNA SPEC QL NAA+PROBE: SIGNIFICANT CHANGE UP
SODIUM SERPL-SCNC: 137 MMOL/L — SIGNIFICANT CHANGE UP (ref 135–145)
SODIUM SERPL-SCNC: 137 MMOL/L — SIGNIFICANT CHANGE UP (ref 135–145)
SP GR SPEC: 1.01 — SIGNIFICANT CHANGE UP (ref 1.01–1.02)
SPECIMEN SOURCE: SIGNIFICANT CHANGE UP
UROBILINOGEN FLD QL: NEGATIVE — SIGNIFICANT CHANGE UP
WBC # BLD: 4.29 K/UL — SIGNIFICANT CHANGE UP (ref 3.8–10.5)
WBC # BLD: 4.58 K/UL — SIGNIFICANT CHANGE UP (ref 3.8–10.5)
WBC # FLD AUTO: 4.29 K/UL — SIGNIFICANT CHANGE UP (ref 3.8–10.5)
WBC # FLD AUTO: 4.58 K/UL — SIGNIFICANT CHANGE UP (ref 3.8–10.5)

## 2020-12-20 PROCEDURE — 99232 SBSQ HOSP IP/OBS MODERATE 35: CPT

## 2020-12-20 PROCEDURE — 99233 SBSQ HOSP IP/OBS HIGH 50: CPT | Mod: GC

## 2020-12-20 PROCEDURE — 99232 SBSQ HOSP IP/OBS MODERATE 35: CPT | Mod: GC

## 2020-12-20 PROCEDURE — 99233 SBSQ HOSP IP/OBS HIGH 50: CPT

## 2020-12-20 RX ORDER — HYDRALAZINE HCL 50 MG
5 TABLET ORAL ONCE
Refills: 0 | Status: COMPLETED | OUTPATIENT
Start: 2020-12-20 | End: 2020-12-20

## 2020-12-20 RX ORDER — HYDRALAZINE HCL 50 MG
5 TABLET ORAL ONCE
Refills: 0 | Status: DISCONTINUED | OUTPATIENT
Start: 2020-12-20 | End: 2020-12-20

## 2020-12-20 RX ORDER — INSULIN GLARGINE 100 [IU]/ML
15 INJECTION, SOLUTION SUBCUTANEOUS AT BEDTIME
Refills: 0 | Status: DISCONTINUED | OUTPATIENT
Start: 2020-12-20 | End: 2020-12-24

## 2020-12-20 RX ORDER — INSULIN LISPRO 100/ML
VIAL (ML) SUBCUTANEOUS
Refills: 0 | Status: DISCONTINUED | OUTPATIENT
Start: 2020-12-20 | End: 2020-12-30

## 2020-12-20 RX ORDER — EVEROLIMUS 10 MG/1
2 TABLET ORAL
Refills: 0 | Status: DISCONTINUED | OUTPATIENT
Start: 2020-12-20 | End: 2020-12-29

## 2020-12-20 RX ORDER — EVEROLIMUS 10 MG/1
1.5 TABLET ORAL
Refills: 0 | Status: DISCONTINUED | OUTPATIENT
Start: 2020-12-21 | End: 2020-12-21

## 2020-12-20 RX ADMIN — Medication 500000 UNIT(S): at 23:16

## 2020-12-20 RX ADMIN — Medication 1300 MILLIGRAM(S): at 05:44

## 2020-12-20 RX ADMIN — Medication 10 DROP(S): at 17:06

## 2020-12-20 RX ADMIN — Medication 5 UNIT(S): at 17:15

## 2020-12-20 RX ADMIN — INSULIN GLARGINE 15 UNIT(S): 100 INJECTION, SOLUTION SUBCUTANEOUS at 22:04

## 2020-12-20 RX ADMIN — PANTOPRAZOLE SODIUM 40 MILLIGRAM(S): 20 TABLET, DELAYED RELEASE ORAL at 07:35

## 2020-12-20 RX ADMIN — Medication 5 UNIT(S): at 06:47

## 2020-12-20 RX ADMIN — EVEROLIMUS 1.5 MILLIGRAM(S): 10 TABLET ORAL at 07:35

## 2020-12-20 RX ADMIN — Medication 5 UNIT(S): at 12:10

## 2020-12-20 RX ADMIN — Medication 1300 MILLIGRAM(S): at 21:07

## 2020-12-20 RX ADMIN — Medication 125 MILLIGRAM(S): at 17:05

## 2020-12-20 RX ADMIN — Medication 81 MILLIGRAM(S): at 12:10

## 2020-12-20 RX ADMIN — HEPARIN SODIUM 5000 UNIT(S): 5000 INJECTION INTRAVENOUS; SUBCUTANEOUS at 17:05

## 2020-12-20 RX ADMIN — MEROPENEM 100 MILLIGRAM(S): 1 INJECTION INTRAVENOUS at 17:05

## 2020-12-20 RX ADMIN — CHLORHEXIDINE GLUCONATE 1 APPLICATION(S): 213 SOLUTION TOPICAL at 05:45

## 2020-12-20 RX ADMIN — Medication 5 MILLIGRAM(S): at 17:05

## 2020-12-20 RX ADMIN — Medication 500000 UNIT(S): at 05:44

## 2020-12-20 RX ADMIN — Medication 2: at 12:11

## 2020-12-20 RX ADMIN — Medication 1: at 08:17

## 2020-12-20 RX ADMIN — Medication 125 MILLIGRAM(S): at 23:16

## 2020-12-20 RX ADMIN — MAGNESIUM OXIDE 400 MG ORAL TABLET 400 MILLIGRAM(S): 241.3 TABLET ORAL at 12:10

## 2020-12-20 RX ADMIN — Medication 125 MILLIGRAM(S): at 12:09

## 2020-12-20 RX ADMIN — EVEROLIMUS 2 MILLIGRAM(S): 10 TABLET ORAL at 21:08

## 2020-12-20 RX ADMIN — Medication 125 MILLIGRAM(S): at 05:44

## 2020-12-20 RX ADMIN — Medication 5 MILLIGRAM(S): at 17:03

## 2020-12-20 RX ADMIN — Medication 1: at 21:09

## 2020-12-20 RX ADMIN — TAMSULOSIN HYDROCHLORIDE 0.4 MILLIGRAM(S): 0.4 CAPSULE ORAL at 21:07

## 2020-12-20 RX ADMIN — Medication 10 DROP(S): at 05:44

## 2020-12-20 RX ADMIN — Medication 1300 MILLIGRAM(S): at 13:53

## 2020-12-20 RX ADMIN — Medication 500000 UNIT(S): at 17:06

## 2020-12-20 RX ADMIN — Medication 500000 UNIT(S): at 12:09

## 2020-12-20 RX ADMIN — Medication 3: at 17:16

## 2020-12-20 RX ADMIN — Medication 5 MILLIGRAM(S): at 05:44

## 2020-12-20 RX ADMIN — HEPARIN SODIUM 5000 UNIT(S): 5000 INJECTION INTRAVENOUS; SUBCUTANEOUS at 05:44

## 2020-12-20 NOTE — PROGRESS NOTE ADULT - ASSESSMENT
64 year old man type 2 diabetes, HLD, GERD, HFpEF with mild LV diastolic dysfunction, and decompensated JEAN cirrhosis, duodenal ulcer, GAVE and duodenal AVM s/p APC (last on 10/11/19) and SHENG who is now s/p liver transplant (7/2/20) with post-op course c/b tacrolimus toxicity and hospital delirium improving after stopping CNI and replacing w/ everolimus. Patient recently presented 11/20/20 with worsening LE swelling and right foot wound with wound culture 11/24 growing S. epidermidis and calcaneus bone biopsy culture 11/24 demonstrating osteomyelitis sent home with cefepime. Patient now with sepsis and MISA likely secondary to cdiff.    Impression:   # Cdiff: on po vanco now. WBC wnl. Diarrhea resolved  # MISA: Remains nonoliguric MISA. Kidney slightly enlarged on u/s- may suggest AIN. nonoliguric. FeNa and FeUrea suggest possible AIN (from cefepime) vs acute tubular necrosis likely from cdiff and volume loss.  # Right foot osteomyelitis:  reviewed by podiatry, appears stable. s/p surgical debridement and graft placement on 11/24 by Podiatry. Wound culture grew pseudomonas and bone bx on 11/24 growing S. epidermidis - likely contaminant. Had PICC line, now removed. On meropenem  # Otitis externa: on floxin drops  # S/p liver transplant 7/2/20: Transaminases and INR mildly elevated likely in setting of infection.   Recipient Info:   ABO: A  CMV:  Neg  EBV Positive  Donor:  Donor ID:  [id number] Match: [id number]  Age: 29 ABO:  A1 High Risk:   No COD: Cardiovascular  Anti CMV positive, EBV IgG- positive  HepBcAb-neg, Hepatitis C-DANA- neg, Hepatitis C ab-neg  # Fever - unclear etiology, infectious workup ongoing.  # Bladder wall thickening  # Type 2 diabetes  # SHENG     Recommendations:  - continue with po vanco  - continue with floxin drops per ENT  - continue with meropenem  - Infectious w/u per SICU team  - HD per renal  - bob for strict I/Os  - immunosuppressives of everolimus and prednisone per transplant surgery  - appreciate transplant ID, renal, surgery reccs  - hold bactrim given MISA  - monitor CBC, CMP, INR  - transplant hepatology to follow    Thank you for involving us in the care of this patient. Please reach out if any further questions.    Adonis Rubio, PGY-4  Hepatology Fellow    Available on [employer] Teams  Pager [phone] ([facility]) or [phone] ([facility])  After 5PM/Weekends, please contact the on-call GI fellow: [phone]  Available through [employer] Teams   64 year old man type 2 diabetes, HLD, GERD, HFpEF with mild LV diastolic dysfunction, and decompensated JEAN cirrhosis, duodenal ulcer, GAVE and duodenal AVM s/p APC (last on 10/11/19) and SHENG who is now s/p liver transplant (7/2/20) with post-op course c/b tacrolimus toxicity and hospital delirium improving after stopping CNI and replacing w/ everolimus. Patient recently presented 11/20/20 with worsening LE swelling and right foot wound with wound culture 11/24 growing S. epidermidis and calcaneus bone biopsy culture 11/24 demonstrating osteomyelitis sent home with cefepime. Patient now with sepsis and MISA likely secondary to cdiff.  Patient developed low grade fever.    Impression:   # Cdiff: on po vanco now. WBC wnl. Diarrhea resolved  # MISA: Remains nonoliguric MISA. Kidney slightly enlarged on u/s- may suggest AIN. nonoliguric. FeNa and FeUrea suggest possible AIN (from cefepime) vs acute tubular necrosis likely from cdiff and volume loss.  # Right foot osteomyelitis:  reviewed by podiatry, appears stable. s/p surgical debridement and graft placement on 11/24 by Podiatry. Wound culture grew pseudomonas and bone bx on 11/24 growing S. epidermidis - likely contaminant. Had PICC line, now removed. On meropenem  # Otitis externa: on floxin drops  # S/p liver transplant 7/2/20: Transaminases and INR mildly elevated likely in setting of infection.   Recipient Info:   ABO: A  CMV:  Neg  EBV Positive  Donor:  Donor ID:  [id number] Match: [id number]  Age: 29 ABO:  A1 High Risk:   No COD: Cardiovascular  Anti CMV positive, EBV IgG- positive  HepBcAb-neg, Hepatitis C-DANA- neg, Hepatitis C ab-neg  # Fever - unclear etiology, infectious workup ongoing.  # Bladder wall thickening  # Type 2 diabetes  # SHENG     Recommendations:  - continue with po vanco, meropenum, and floxin drops per ENT  - w/u for fever to rule out infection per SICU team  - HD per renal  - bob for strict I/Os  - immunosuppressives of everolimus and prednisone per transplant surgery  - appreciate transplant ID, renal, surgery reccs  - hold bactrim given MISA  - monitor CBC, CMP, INR  - transplant hepatology to follow    Thank you for involving us in the care of this patient. Please reach out if any further questions.    Adonis Rubio, PGY-4  Hepatology Fellow    Available on [employer] Teams  Pager [phone] ([facility]) or [phone] ([facility])  After 5PM/Weekends, please contact the on-call GI fellow: [phone]  Available through [employer] Teams

## 2020-12-20 NOTE — PROGRESS NOTE ADULT - SUBJECTIVE AND OBJECTIVE BOX
Chief Complaint:  Patient is a 65y old  Male who presents with a chief complaint of transaminitis (20 Dec 2020 09:46)      Interval Events:   - Underwent shiley placement and HD y/d  - Febrile to 100.8 this AM, infectious workup in process, asymptomatic  - Reports feeling well this AM, no diarrhea, stool is becoming bulkier    Allergies:  codeine (Anaphylaxis)      Hospital Medications:  aspirin  chewable 81 milliGRAM(s) Oral daily  chlorhexidine 2% Cloths 1 Application(s) Topical <User Schedule>  epoetin barry-epbx (RETACRIT) Injectable 75402 Unit(s) IV Push every 7 days  everolimus (ZORTRESS) 1.5 milliGRAM(s) Oral <User Schedule>  heparin   Injectable 5000 Unit(s) SubCutaneous every 12 hours  insulin glargine Injectable (LANTUS) 10 Unit(s) SubCutaneous at bedtime  insulin lispro (ADMELOG) corrective regimen sliding scale   SubCutaneous three times a day before meals  insulin lispro (ADMELOG) corrective regimen sliding scale   SubCutaneous at bedtime  insulin lispro Injectable (ADMELOG) 5 Unit(s) SubCutaneous three times a day before meals  magnesium oxide 400 milliGRAM(s) Oral daily  meropenem  IVPB 500 milliGRAM(s) IV Intermittent <User Schedule>  nystatin    Suspension 618862 Unit(s) Oral four times a day  ofloxacin 0.3% Solution 10 Drop(s) Right Ear two times a day  pantoprazole    Tablet 40 milliGRAM(s) Oral before breakfast  predniSONE   Tablet 5 milliGRAM(s) Oral two times a day  sodium bicarbonate 1300 milliGRAM(s) Oral three times a day  tamsulosin 0.4 milliGRAM(s) Oral at bedtime  valGANciclovir 450 milliGRAM(s) Oral <User Schedule>  vancomycin    Solution 125 milliGRAM(s) Oral every 6 hours      PMHX/PSHX:  GIB (gastrointestinal bleeding)    GERD with esophagitis    Hepatic encephalopathy    Obesity    Fatty liver disease, nonalcoholic    Renal stones    Hypertension    Neuropathy    Hypercholesteremia    Diabetes    S/P cholecystectomy    No significant past surgical history        Family history:  Family history of type 2 diabetes mellitus    Family history of hypertension    Family history of stomach cancer    No pertinent family history in first degree relatives        ROS:   General:  No wt loss, fevers, chills, night sweats, fatigue,   Eyes:  Good vision, no reported pain  ENT:  No sore throat, pain, runny nose, dysphagia  CV:  No pain, palpitations, hypo/hypertension  Resp:  No dyspnea, cough, tachypnea, wheezing  GI:  See HPI  :  No pain, bleeding, incontinence, nocturia  Muscle:  No pain, weakness  Neuro:  No weakness, tingling, memory problems  Psych:  No fatigue, insomnia, mood problems, depression  Endocrine:  No polyuria, polydipsia, cold/heat intolerance  Heme:  No petechiae, ecchymosis, easy bruisability  Skin:  No rash, edema      PHYSICAL EXAM:   Vital Signs:  Vital Signs Last 24 Hrs  T(C): 38.2 (20 Dec 2020 07:00), Max: 38.2 (20 Dec 2020 07:00)  T(F): 100.8 (20 Dec 2020 07:00), Max: 100.8 (20 Dec 2020 07:00)  HR: 82 (20 Dec 2020 07:23) (75 - 95)  BP: 170/80 (20 Dec 2020 07:23) (148/68 - 170/80)  BP(mean): 115 (20 Dec 2020 07:23) (95 - 115)  RR: 16 (20 Dec 2020 07:23) (7 - 23)  SpO2: 93% (20 Dec 2020 07:23) (89% - 98%)  Daily     Daily Weight in k.2 (20 Dec 2020 03:01)    GENERAL:  appears comfortable, no acute distress  HEENT:  NC/AT,  conjunctivae clear, sclera -anicteric  CHEST:  no increased effort  HEART:  Regular rate and rhythm  ABDOMEN:  Soft, non-tender, non-distended,  no masses ,no hepato-splenomegaly,   EXTREMITIES:  no cyanosis, clubbing or edema  SKIN:  right foot wrapped  NEURO:  Alert, oriented    LABS:                        7.5    4.29  )-----------( 170      ( 20 Dec 2020 06:05 )             22.4     Mean Cell Volume: 87.2 fl (-20 @ 06:05)    12    137  |  102  |  72<H>  ----------------------------<  234<H>  4.3   |  22  |  5.62<H>    Ca    8.3<L>      20 Dec 2020 06:05  Phos  4.2       Mg     2.0         TPro  5.7<L>  /  Alb  2.7<L>  /  TBili  0.2  /  DBili  x   /  AST  34  /  ALT  41  /  AlkPhos  206<H>  12    LIVER FUNCTIONS - ( 20 Dec 2020 06:05 )  Alb: 2.7 g/dL / Pro: 5.7 g/dL / ALK PHOS: 206 U/L / ALT: 41 U/L / AST: 34 U/L / GGT: x           PT/INR - ( 20 Dec 2020 06:05 )   PT: 13.7 sec;   INR: 1.15 ratio         PTT - ( 20 Dec 2020 06:05 )  PTT:28.2 sec    Amylase Serum--      Lipase serum--       [id number]                          7.5    4.29  )-----------( 170      ( 20 Dec 2020 06:05 )             22.4                         7.4    4.50  )-----------( 179      ( 19 Dec 2020 05:29 )             22.9                         8.3    6.52  )-----------( 188      ( 18 Dec 2020 06:18 )             25.9

## 2020-12-20 NOTE — PROGRESS NOTE ADULT - ATTENDING COMMENTS
Patient was seen and examined with hepatology team on rounds on 12/20/2020.   Patient developed fever up to 100.8. Workup for infection in progress per transplant surgery team and continue current meds and care per primary team..

## 2020-12-20 NOTE — PROGRESS NOTE ADULT - ASSESSMENT
65M with PMHx of IDDM, HLD, obesity, HFpEF with mild LV diastolic dysfunction, MGUS, chronic anemia with a history of duodenal ulcer 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  Underwent OLT on 7/2/2020, post op coure c/b VRE bacteremia tx with linezolid and delirium likely in setting of CNI toxicity (FK discontinued/Everolimus initiated 7/27).    Recent admission from 11/20-12/2 for OM of the R heel, s/p debridement with Podiatry, and was discharged with Cefepime x 6 weeks via PICC line (placed 12/1).   Initial wound cx on admission grew Pseudomonas, OR cx grew staph epi.  He now presents with from clinic rising transaminitis and MISA (SCr 3.3 from 1.5)  Hospital course c/b c diff colitis and worsening renal function     [] ID:   - R heal OM: s/p debridement by podiatry last admission; wound healing, no signs on infection. ID following; cont meropenem   - C diff colitis: cont PO vanco; diarrhea improving  - TTE w/o obvious evidence of endocarditis   - CMV +/- On valcyte 450mg BIW (renally adjusted), CMV PCR neg from 12/15  - Cultures: bld/urine negative; PICC line tip negative    [] MISA   - Worsening renal function with assoc uremic encephelopathy.   - Shiley placed 12/19, s/p HD 12/19   - Anemia: resume Procrit 10K sq weekly    [] s/p OLT with transaminitis  - Everolimus 1.5mg bid, MMF on hold since last admission in setting of OM; Pred 5mg bid  - PPx: Valcyte  renally dosed, off Bactrim  - Liver doppler with patent vessels   - LFTs trending down    [] DM  - Controlled  - diabetic diet  - cont Lantus 10u qhs/lispro   - cont ISS    [] Dispo:   - appreciate SICU care   65M with PMHx of IDDM, HLD, obesity, HFpEF with mild LV diastolic dysfunction, MGUS, chronic anemia with a history of duodenal ulcer 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  Underwent OLT on 7/2/2020, post op coure c/b VRE bacteremia tx with linezolid and delirium likely in setting of CNI toxicity (FK discontinued/Everolimus initiated 7/27).    Recent admission from 11/20-12/2 for OM of the R heel, s/p debridement with Podiatry, and was discharged with Cefepime x 6 weeks via PICC line (placed 12/1).   Initial wound cx on admission grew Pseudomonas, OR cx grew staph epi.  He now presents with from clinic rising transaminitis and MISA (SCr 3.3 from 1.5)  Hospital course c/b c diff colitis and worsening renal function     [] ID:   - R heal OM: s/p debridement by podiatry last admission; wound healing, no signs on infection. ID following; cont meropenem   - C diff colitis: cont PO vanco; diarrhea improving  - TTE w/o obvious evidence of endocarditis   - CMV +/- On valcyte 450mg BIW (renally adjusted), CMV PCR neg from 12/15  - Cultures: bld/urine negative; PICC line tip negative  -f/u 12/20 bld cultures, febrile this AM to 100.8    [] MISA   - Worsening renal function with assoc uremic encephelopathy.   - Shiley placed 12/19, s/p HD 12/19   - Anemia: resume Procrit 10K sq weekly    [] s/p OLT with transaminitis  - Everolimus 1.5mg bid, MMF on hold since last admission in setting of OM; Pred 5mg bid  - PPx: Valcyte  renally dosed, off Bactrim  - Liver doppler with patent vessels   - LFTs trending down    [] DM  - Controlled  - diabetic diet  - cont Lantus 10u qhs/lispro   - cont ISS    [] Dispo:   - appreciate SICU care   65M with PMHx of IDDM, HLD, obesity, HFpEF with mild LV diastolic dysfunction, MGUS, chronic anemia with a history of duodenal ulcer 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  Underwent OLT on 7/2/2020, post op coure c/b VRE bacteremia tx with linezolid and delirium likely in setting of CNI toxicity (FK discontinued/Everolimus initiated 7/27).    Recent admission from 11/20-12/2 for OM of the R heel, s/p debridement with Podiatry, and was discharged with Cefepime x 6 weeks via PICC line (placed 12/1).   Initial wound cx on admission grew Pseudomonas, OR cx grew staph epi.  He now presents with from clinic rising transaminitis and MISA (SCr 3.3 from 1.5)  Hospital course c/b c diff colitis and worsening renal function     [] ID:   - R heal OM: s/p debridement by podiatry last admission; wound healing, no signs on infection. ID following; cont meropenem   - C diff colitis: cont PO vanco; diarrhea improving; however multiple BMs during day yesterday and once overnight and one this AM. Per nurse, BM overnight and this AM was soft.   -Febrile this AM to 100.8, bld cx sent. Please also send Urine culture, procalcitonin, and fungitell.   -Will discuss w/ ID on starting flagyl.  - TTE w/o obvious evidence of endocarditis   - CMV +/- On valcyte 450mg BIW (renally adjusted), CMV PCR neg from 12/15  - Cultures: bld/urine negative; PICC line tip negative. Repeat Bld cx sent this AM.    [] MISA   - Worsening renal function with assoc uremic encephelopathy.   - Shiley placed 12/19, s/p HD 12/19   - Anemia: resume Procrit 10K sq weekly    [] s/p OLT with transaminitis  - Everolimus 1.5mg bid, MMF on hold since last admission in setting of OM; Pred 5mg bid  - PPx: Valcyte  renally dosed, off Bactrim  - Liver doppler with patent vessels   - LFTs trending down    [] DM  - Controlled  - diabetic diet  - cont Lantus 10u qhs/lispro   - cont ISS    [] Dispo:   - appreciate SICU care

## 2020-12-20 NOTE — PROGRESS NOTE ADULT - ATTENDING COMMENTS
low grade fever, bld/urine cx sent.   no localizing symptoms.  feels better with HD  immuno: everolimus, pred

## 2020-12-20 NOTE — PROGRESS NOTE ADULT - SUBJECTIVE AND OBJECTIVE BOX
[facility]  Ramón Morales PA-C  [address] [place] [address]  [phone]      INTERVAL HPI/OVERNIGHT EVENTS:    patient s/e  events noted  seen in icu  no diarrhea      MEDICATIONS  (STANDING):  aspirin  chewable 81 milliGRAM(s) Oral daily  chlorhexidine 2% Cloths 1 Application(s) Topical daily  epoetin barry-epbx (RETACRIT) Injectable 06883 Unit(s) SubCutaneous every 7 days  everolimus (ZORTRESS) 1.5 milliGRAM(s) Oral <User Schedule>  heparin   Injectable 5000 Unit(s) SubCutaneous every 12 hours  insulin glargine Injectable (LANTUS) 10 Unit(s) SubCutaneous at bedtime  insulin lispro (ADMELOG) corrective regimen sliding scale   SubCutaneous three times a day before meals  insulin lispro (ADMELOG) corrective regimen sliding scale   SubCutaneous at bedtime  insulin lispro Injectable (ADMELOG) 5 Unit(s) SubCutaneous three times a day before meals  insulin lispro Injectable (ADMELOG) 2 Unit(s) SubCutaneous once  magnesium oxide 400 milliGRAM(s) Oral daily  meropenem  IVPB 500 milliGRAM(s) IV Intermittent every 24 hours  nystatin    Suspension 997914 Unit(s) Oral four times a day  ofloxacin 0.3% Solution 10 Drop(s) Right Ear two times a day  pantoprazole    Tablet 40 milliGRAM(s) Oral before breakfast  predniSONE   Tablet 5 milliGRAM(s) Oral two times a day  sodium bicarbonate 1300 milliGRAM(s) Oral three times a day  sodium bicarbonate  Infusion 0.05 mEq/kG/Hr (75 mL/Hr) IV Continuous <Continuous>  tamsulosin 0.4 milliGRAM(s) Oral at bedtime  valGANciclovir 450 milliGRAM(s) Oral <User Schedule>  vancomycin    Solution 125 milliGRAM(s) Oral every 6 hours    MEDICATIONS  (PRN):      Allergies    codeine (Anaphylaxis)    Intolerances        ROS:   General:  No wt loss, fevers, chills, night sweats, + fatigue,   Eyes:  Good vision, no reported pain  ENT:  No sore throat, pain, runny nose, dysphagia  CV:  No pain, palpitations, hypo/hypertension  Resp:  No dyspnea, cough, tachypnea, wheezing  GI:  No pain, No nausea, No vomiting, No diarrhea, No constipation, No weight loss, No fever, No pruritis, No rectal bleeding, No tarry stools, No dysphagia,  :  No pain, bleeding, incontinence, nocturia  Muscle:  No pain, weakness  Neuro:  No weakness, tingling, memory problems  Psych:  No fatigue, insomnia, mood problems, depression  Endocrine:  No polyuria, polydipsia, cold/heat intolerance  Heme:  No petechiae, ecchymosis, easy bruisability  Skin:  No rash, tattoos, scars, edema      PHYSICAL EXAM:   Vital Signs:  Vital Signs Last 24 Hrs  T(C): 36.5 (19 Dec 2020 15:00), Max: 37.2 (19 Dec 2020 04:00)  T(F): 97.7 (19 Dec 2020 15:00), Max: 99 (19 Dec 2020 04:00)  HR: 75 (19 Dec 2020 15:00) (75 - 91)  BP: 148/68 (19 Dec 2020 15:00) (118/81 - 163/70)  BP(mean): 95 (19 Dec 2020 15:00) (90 - 106)  RR: 20 (19 Dec 2020 15:) (16 - 20)  SpO2: 96% (19 Dec 2020 15:00) (92% - 98%)  Daily     Daily Weight in k.8 (18 Dec 2020 20:35)    GENERAL:  Appears stated age, well-groomed, well-nourished, no distress  HEENT:  NC/AT,  conjunctivae clear and pink, no thyromegaly, nodules, adenopathy, no JVD, sclera -anicteric  CHEST:  Full & symmetric excursion, no increased effort, breath sounds clear  HEART:  Regular rhythm, S1, S2, no murmur/rub/S3/S4, no abdominal bruit, no edema  ABDOMEN:  Soft, non-tender, non-distended, normoactive bowel sounds,  no masses ,no hepato-splenomegaly, no signs of chronic liver disease  EXTEREMITIES:  no cyanosis,clubbing or edema  SKIN:  No rash/erythema/ecchymoses/petechiae/wounds/abscess/warm/dry  NEURO:  Alert, oriented, no asterixis, no tremor, no encephalopathy      LABS:                        7.4    4.50  )-----------( 179      ( 19 Dec 2020 05:29 )             22.9     12-    133<L>  |  101  |  92<H>  ----------------------------<  224<H>  4.7   |  16<L>  |  6.94<H>    Ca    8.2<L>      19 Dec 2020 05:29  Phos  4.6       Mg     2.2         TPro  5.8<L>  /  Alb  2.6<L>  /  TBili  0.1<L>  /  DBili  x   /  AST  31  /  ALT  47<H>  /  AlkPhos  222<H>  -    PT/INR - ( 19 Dec 2020 05:29 )   PT: 13.1 sec;   INR: 1.10 ratio         PTT - ( 19 Dec 2020 05:29 )  PTT:26.1 sec  Urinalysis Basic - ( 17 Dec 2020 16:54 )    Color: Light Yellow / Appearance: Slightly Turbid / S.012 / pH: x  Gluc: x / Ketone: Negative  / Bili: Negative / Urobili: Negative   Blood: x / Protein: 100 mg/dL / Nitrite: Negative   Leuk Esterase: Large / RBC: 3 /hpf /  /HPF   Sq Epi: x / Non Sq Epi: 0 /hpf / Bacteria: Negative        RADIOLOGY & ADDITIONAL TESTS:

## 2020-12-20 NOTE — PROGRESS NOTE ADULT - SUBJECTIVE AND OBJECTIVE BOX
[facility] DIVISION OF KIDNEY DISEASES AND HYPERTENSION -- FOLLOW UP NOTE  --------------------------------------------------------------------------------  Authored by: Adele Rowland   Cell # [phone]     JESSICA MARTIN was seen and examined at bedside.     HPI: 65 yr old man with history of JEAN/Cirrhosis 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 on immunosuppression with Everolimus and CellCept. He is admitted with MISA and new transaminitis.     PMH: DM on insulin, hyperlipidemia, mild diastolic dysfunction, MGUS, chronic anemia, duodenal ulcer.   Post liver transplant he was unable to tolerate CNI due to neurotoxicity and was switched to Everolimus.   He has non healing left heal ulcer that was recently complicated by osteomyelitis s/p debridement by podiatry, he was on IV cefepime x 6 week course of which he completed 2 weeks.     Upon admission, Cefepime switched to meropenem. Stool +ve for C.diff but All cultures remain negative. He has worsening azotemia. HD done yesterday, no ultrafiltration.    Loose BP 5 times in the past 24 hours, had fever of 100.8 this AM, cultures repeated. UOP 1.4 liters/24 hours. Reports metallic taste is better. He no longer has nose bleeds, ambulating to the bathroom.         Standing Inpatient Medications  aspirin  chewable 81 milliGRAM(s) Oral daily  chlorhexidine 2% Cloths 1 Application(s) Topical <User Schedule>  epoetin barry-epbx (RETACRIT) Injectable 73412 Unit(s) IV Push every 7 days  everolimus (ZORTRESS) 1.5 milliGRAM(s) Oral <User Schedule>  heparin   Injectable 5000 Unit(s) SubCutaneous every 12 hours  insulin glargine Injectable (LANTUS) 10 Unit(s) SubCutaneous at bedtime  insulin lispro (ADMELOG) corrective regimen sliding scale   SubCutaneous three times a day before meals  insulin lispro (ADMELOG) corrective regimen sliding scale   SubCutaneous at bedtime  insulin lispro Injectable (ADMELOG) 5 Unit(s) SubCutaneous three times a day before meals  magnesium oxide 400 milliGRAM(s) Oral daily  meropenem  IVPB 500 milliGRAM(s) IV Intermittent <User Schedule>  nystatin    Suspension 957427 Unit(s) Oral four times a day  ofloxacin 0.3% Solution 10 Drop(s) Right Ear two times a day  pantoprazole    Tablet 40 milliGRAM(s) Oral before breakfast  predniSONE   Tablet 5 milliGRAM(s) Oral two times a day  sodium bicarbonate 1300 milliGRAM(s) Oral three times a day  tamsulosin 0.4 milliGRAM(s) Oral at bedtime  valGANciclovir 450 milliGRAM(s) Oral <User Schedule>  vancomycin    Solution 125 milliGRAM(s) Oral every 6 hours    VITALS/PHYSICAL EXAM  --------------------------------------------------------------------------------  T(C): 38.2 (12-20-20 @ 07:00), Max: 38.2 (12-20-20 @ 07:00)  HR: 82 (12-20-20 @ 07:23) (75 - 95)  BP: 170/80 (12-20-20 @ 07:23) (148/68 - 170/80)  RR: 16 (12-20-20 @ 07:23) (7 - 23)  SpO2: 93% (12-20-20 @ 07:23) (89% - 98%)        12-19-20 @ 07:01  -  12-20-20 @ 07:00  --------------------------------------------------------  IN: 1300 mL / OUT: 1600 mL / NET: -300 mL      Physical Exam:  Awake and alert, pale, no signs of distress  moist oral mucosa  no JVD  lungs clear b/l  Systolic murmur at apex   Abdomen obese, soft, non tender  No edema, right heel dressing   Alert and oriented, No asterixis       LABS/STUDIES  --------------------------------------------------------------------------------              7.5    4.29  >-----------<  170      [12-20-20 @ 06:05]              22.4     137  |  102  |  72  ----------------------------<  234      [12-20-20 @ 06:05]  4.3   |  22  |  5.62        Ca     8.3     [12-20-20 @ 06:05]      Mg     2.0     [12-20-20 @ 06:05]      Phos  4.2     [12-20-20 @ 06:05]    TPro  5.7  /  Alb  2.7  /  TBili  0.2  /  DBili  x   /  AST  34  /  ALT  41  /  AlkPhos  206  [12-20-20 @ 06:05]    PT/INR: PT 13.7 , INR 1.15       [12-20-20 @ 06:05]  PTT: 28.2       [12-20-20 @ 06:05]    CK 41      [12-19-20 @ 05:29]    Creatinine Trend:  SCr 5.62 [12-20 @ 06:05]  SCr 6.94 [12-19 @ 05:29]  SCr 6.63 [12-18 @ 16:14]  SCr 6.60 [12-18 @ 06:17]  SCr 6.06 [12-17 @ 06:16]      CAPILLARY BLOOD GLUCOSE    POCT Blood Glucose.: 182 mg/dL (20 Dec 2020 08:12)  POCT Blood Glucose.: 241 mg/dL (20 Dec 2020 06:45)  POCT Blood Glucose.: 155 mg/dL (19 Dec 2020 22:02)  POCT Blood Glucose.: 187 mg/dL (19 Dec 2020 17:22)  POCT Blood Glucose.: 164 mg/dL (19 Dec 2020 12:10)      Urinalysis - [12-17-20 @ 16:54]      Color Light Yellow / Appearance Slightly Turbid / SG 1.012 / pH 6.0      Gluc 100 mg/dL / Ketone Negative  / Bili Negative / Urobili Negative       Blood Moderate / Protein 100 mg/dL / Leuk Est Large / Nitrite Negative      RBC 3 /  / Hyaline  / Gran  / Sq Epi  / Non Sq Epi 0 / Bacteria Negative    Urine Creatinine 67      [12-15-20 @ 11:39]  Urine Protein 126      [12-17-20 @ 14:14]  Urine Sodium 44      [12-15-20 @ 11:39]  Urine Urea Nitrogen 367      [12-15-20 @ 15:35]  Urine Chloride 43      [12-15-20 @ 11:39]  Urine Osmolality 311      [12-15-20 @ 11:39]    Iron 160, TIBC Unable to calculate Test Repeated, %sat Unable to calculate Test Repeated      [06-12-20 @ 09:04]  Ferritin 493      [04-29-20 @ 08:20]  HbA1c 6.4      [02-12-20 @ 17:08]  TSH 3.26      [04-26-20 @ 09:47]  Lipid: chol 158, , HDL 44, LDL 93      [08-05-20 @ 08:57]    HBsAg Nonreact      [12-19-20 @ 14:40]  HCV 0.06, Nonreact      [12-19-20 @ 14:40]    C3 Complement 158      [12-17-20 @ 16:43]  C4 Complement 38      [12-17-20 @ 16:43]

## 2020-12-20 NOTE — PROGRESS NOTE ADULT - SUBJECTIVE AND OBJECTIVE BOX
Transplant Surgery - Multidisciplinary Rounds  --------------------------------------------------------------  s/p OLT 7/2/2020      Admitted 12/14/2020 with elevated LFTs, MISA, diarrhea    Present:  Patient seen and examined with multidisciplinary team including Transplant Surgeon: Dr. Gray, Transplant Nephrologist: Dr. Rowland, OLY Burciaga and unit RN during am rounds. Disciplines not in attendance will be notified of the plan.      HPI: 65M with PMHx of IDDM, HLD, obesity, HFpEF with mild LV diastolic dysfunction, MGUS, chronic anemia with a history of duodenal ulcer 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  Underwent OLT on 7/2/2020, post op course c/b VRE bacteremia tx with linezolid and delirium likely in setting of CNI toxicity (FK discontinued/Everolimus initiated 7/27).    Recent admission from 11/20-12/2 for OM of the R heel, s/p debridement with Podiatry, and was discharged with Cefepime x 6 weeks via PICC line (placed 12/1). Initial wound cx on admission grew Pseudomonas, OR cx grew staph epi. Was also found with neutropenia and Valcyte was reduced (was on 900mg daily CMV +/-).      Sent to ED from clinic with rising transaminitis and MISA (SCr 3.3 from 1.5). Hospital course c/b C diff colitis and worsening renal function.     Interval Events:  - Afebrile, stable VSS  - Anbx: remains on meropenem, PO vanco, Ofloxacin (R OE)  - Worsening renal function, concern for uremic encephalopathy: Shiley was placed yesterday, s/p HD, no fluid removal.   -Start weekly procrit yesterday for anemia.   -Bicarb infusion discontinued this AM.   - AO x 3 (person/time/place), + encephalopathy (hallucinations)  - LFTS improving; ammonia 29    Potential Discharge date: pending clinical improvement     Education:  Medications    Plan of care:  See Below      MEDICATIONS  (STANDING):  aspirin  chewable 81 milliGRAM(s) Oral daily  chlorhexidine 2% Cloths 1 Application(s) Topical <User Schedule>  epoetin barry-epbx (RETACRIT) Injectable 53569 Unit(s) IV Push every 7 days  everolimus (ZORTRESS) 1.5 milliGRAM(s) Oral <User Schedule>  heparin   Injectable 5000 Unit(s) SubCutaneous every 12 hours  insulin glargine Injectable (LANTUS) 10 Unit(s) SubCutaneous at bedtime  insulin lispro (ADMELOG) corrective regimen sliding scale   SubCutaneous three times a day before meals  insulin lispro (ADMELOG) corrective regimen sliding scale   SubCutaneous at bedtime  insulin lispro Injectable (ADMELOG) 5 Unit(s) SubCutaneous three times a day before meals  magnesium oxide 400 milliGRAM(s) Oral daily  meropenem  IVPB 500 milliGRAM(s) IV Intermittent <User Schedule>  nystatin    Suspension 966740 Unit(s) Oral four times a day  ofloxacin 0.3% Solution 10 Drop(s) Right Ear two times a day  pantoprazole    Tablet 40 milliGRAM(s) Oral before breakfast  predniSONE   Tablet 5 milliGRAM(s) Oral two times a day  sodium bicarbonate 1300 milliGRAM(s) Oral three times a day  tamsulosin 0.4 milliGRAM(s) Oral at bedtime  valGANciclovir 450 milliGRAM(s) Oral <User Schedule>  vancomycin    Solution 125 milliGRAM(s) Oral every 6 hours    MEDICATIONS  (PRN):      PAST MEDICAL & SURGICAL HISTORY:  GIB (gastrointestinal bleeding)    GERD with esophagitis  Gastritis &amp; Non Bleeding Ulcers    Hepatic encephalopathy    Obesity    Fatty liver disease, nonalcoholic    Renal stones  25 years ago    Hypertension    Neuropathy    Hypercholesteremia    Diabetes    S/P cholecystectomy        Vital Signs Last 24 Hrs  T(C): 38.2 (20 Dec 2020 07:00), Max: 38.2 (20 Dec 2020 07:00)  T(F): 100.8 (20 Dec 2020 07:00), Max: 100.8 (20 Dec 2020 07:00)  HR: 82 (20 Dec 2020 07:23) (75 - 95)  BP: 170/80 (20 Dec 2020 07:23) (148/68 - 170/80)  BP(mean): 115 (20 Dec 2020 07:23) (95 - 115)  RR: 16 (20 Dec 2020 07:23) (7 - 23)  SpO2: 93% (20 Dec 2020 07:23) (89% - 98%)    I&O's Summary    19 Dec 2020 07:01  -  20 Dec 2020 07:00  --------------------------------------------------------  IN: 1300 mL / OUT: 1600 mL / NET: -300 mL                              7.5    4.29  )-----------( 170      ( 20 Dec 2020 06:05 )             22.4     12-20    137  |  102  |  72<H>  ----------------------------<  234<H>  4.3   |  22  |  5.62<H>    Ca    8.3<L>      20 Dec 2020 06:05  Phos  4.2     12-20  Mg     2.0     12-20    TPro  5.7<L>  /  Alb  2.7<L>  /  TBili  0.2  /  DBili  x   /  AST  34  /  ALT  41  /  AlkPhos  206<H>  12-20          Culture - Urine (collected 12-17-20 @ 20:08)  Source: .Urine Catheterized  Final Report (12-18-20 @ 15:59):    No growth    Culture - Catheter (collected 12-15-20 @ 16:43)  Source: .Catheter PICC line tip  Final Report (12-17-20 @ 18:03):    < 15 colonies Coag Negative Staphylococcus    Culture - Urine (collected 12-15-20 @ 16:28)  Source: .Urine Clean Catch (Midstream)  Final Report (12-16-20 @ 12:12):    <10,000 CFU/mL Normal Urogenital Shantel    Culture - Blood (collected 12-14-20 @ 23:13)  Source: .Blood Blood-Peripheral  Final Report (12-20-20 @ 01:01):    No Growth Final    Culture - Blood (collected 12-14-20 @ 23:13)  Source: .Blood Blood-Peripheral  Final Report (12-20-20 @ 01:01):    No Growth Final    Culture - Blood (collected 12-14-20 @ 23:13)  Source: .Blood PICC/PERC Single Lumen  Final Report (12-20-20 @ 01:01):    No Growth Final            Review of systems  Gen: tired  Skin:  R heel wound   Head/Eyes/Ears/Mouth: No headache; Normal hearing; Normal vision w/o blurriness; No sinus pain/discomfort, sore throat  Respiratory: No dyspnea, cough, wheezing, hemoptysis  CV: No chest pain, PND, orthopnea  GI:  denies abdominal pain, diarrhea, constipation, nausea, vomiting, melena, hematochezia  : No increased frequency, dysuria, hematuria, nocturia  MSK: No joint pain/swelling; no back pain; no edema  Neuro: No dizziness/lightheadedness, weakness, seizures, numbness, tingling  Heme: No easy bruising or bleeding  Endo: No heat/cold intolerance  Psych: No significant nervousness, anxiety, stress, depression  All other systems were reviewed and are negative, except as noted.      PHYSICAL EXAM:  Constitutional: Well developed / well nourished  Eyes: Anicteric, PERRLA  ENMT: nc/at  Respiratory: CTA B/L  Cardiovascular: RRR, + heart murmur  Gastrointestinal: Soft, non distended, non tender, incision well healed  Genitourinary: Voiding spontaneously  Extremities: SCD's in place and working bilaterally  Vascular: Palpable dp pulses bilaterally  Neurological: A&O x3  Skin: R heel ulcer: no drainage, no cellulitis   Musculoskeletal: Moving all extremities  Psychiatric: Responsive     Transplant Surgery - Multidisciplinary Rounds  --------------------------------------------------------------  s/p OLT 7/2/2020      Admitted 12/14/2020 with elevated LFTs, MISA, diarrhea    Present:  Patient seen and examined with multidisciplinary team including Transplant Surgeon: Dr. Gray, Transplant Nephrologist: Dr. Rowland, OLY Burciaga and unit RN during am rounds. Disciplines not in attendance will be notified of the plan.      HPI: 65M with PMHx of IDDM, HLD, obesity, HFpEF with mild LV diastolic dysfunction, MGUS, chronic anemia with a history of duodenal ulcer 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  Underwent OLT on 7/2/2020, post op course c/b VRE bacteremia tx with linezolid and delirium likely in setting of CNI toxicity (FK discontinued/Everolimus initiated 7/27).    Recent admission from 11/20-12/2 for OM of the R heel, s/p debridement with Podiatry, and was discharged with Cefepime x 6 weeks via PICC line (placed 12/1). Initial wound cx on admission grew Pseudomonas, OR cx grew staph epi. Was also found with neutropenia and Valcyte was reduced (was on 900mg daily CMV +/-).      Sent to ED from clinic with rising transaminitis and MISA (SCr 3.3 from 1.5). Hospital course c/b C diff colitis and worsening renal function.     Interval Events:  - Febrile to 100.8 this AM, blood cultures ordered.   - Anbx: remains on meropenem, PO vanco, Ofloxacin (R OE)  - Worsening renal function, concern for uremic encephalopathy: Shiley was placed yesterday, s/p HD, no fluid removal.   -Start weekly procrit yesterday for anemia.   -Bicarb infusion discontinued this AM.   - AO x 3 (person/time/place), + encephalopathy (hallucinations)  - LFTS improving; ammonia 29    Potential Discharge date: pending clinical improvement     Education:  Medications    Plan of care:  See Below      MEDICATIONS  (STANDING):  aspirin  chewable 81 milliGRAM(s) Oral daily  chlorhexidine 2% Cloths 1 Application(s) Topical <User Schedule>  epoetin barry-epbx (RETACRIT) Injectable 29089 Unit(s) IV Push every 7 days  everolimus (ZORTRESS) 1.5 milliGRAM(s) Oral <User Schedule>  heparin   Injectable 5000 Unit(s) SubCutaneous every 12 hours  insulin glargine Injectable (LANTUS) 10 Unit(s) SubCutaneous at bedtime  insulin lispro (ADMELOG) corrective regimen sliding scale   SubCutaneous three times a day before meals  insulin lispro (ADMELOG) corrective regimen sliding scale   SubCutaneous at bedtime  insulin lispro Injectable (ADMELOG) 5 Unit(s) SubCutaneous three times a day before meals  magnesium oxide 400 milliGRAM(s) Oral daily  meropenem  IVPB 500 milliGRAM(s) IV Intermittent <User Schedule>  nystatin    Suspension 709815 Unit(s) Oral four times a day  ofloxacin 0.3% Solution 10 Drop(s) Right Ear two times a day  pantoprazole    Tablet 40 milliGRAM(s) Oral before breakfast  predniSONE   Tablet 5 milliGRAM(s) Oral two times a day  sodium bicarbonate 1300 milliGRAM(s) Oral three times a day  tamsulosin 0.4 milliGRAM(s) Oral at bedtime  valGANciclovir 450 milliGRAM(s) Oral <User Schedule>  vancomycin    Solution 125 milliGRAM(s) Oral every 6 hours    MEDICATIONS  (PRN):      PAST MEDICAL & SURGICAL HISTORY:  GIB (gastrointestinal bleeding)    GERD with esophagitis  Gastritis &amp; Non Bleeding Ulcers    Hepatic encephalopathy    Obesity    Fatty liver disease, nonalcoholic    Renal stones  25 years ago    Hypertension    Neuropathy    Hypercholesteremia    Diabetes    S/P cholecystectomy        Vital Signs Last 24 Hrs  T(C): 38.2 (20 Dec 2020 07:00), Max: 38.2 (20 Dec 2020 07:00)  T(F): 100.8 (20 Dec 2020 07:00), Max: 100.8 (20 Dec 2020 07:00)  HR: 82 (20 Dec 2020 07:23) (75 - 95)  BP: 170/80 (20 Dec 2020 07:23) (148/68 - 170/80)  BP(mean): 115 (20 Dec 2020 07:23) (95 - 115)  RR: 16 (20 Dec 2020 07:23) (7 - 23)  SpO2: 93% (20 Dec 2020 07:23) (89% - 98%)    I&O's Summary    19 Dec 2020 07:01  -  20 Dec 2020 07:00  --------------------------------------------------------  IN: 1300 mL / OUT: 1600 mL / NET: -300 mL                              7.5    4.29  )-----------( 170      ( 20 Dec 2020 06:05 )             22.4     12-20    137  |  102  |  72<H>  ----------------------------<  234<H>  4.3   |  22  |  5.62<H>    Ca    8.3<L>      20 Dec 2020 06:05  Phos  4.2     12-20  Mg     2.0     12-20    TPro  5.7<L>  /  Alb  2.7<L>  /  TBili  0.2  /  DBili  x   /  AST  34  /  ALT  41  /  AlkPhos  206<H>  12-20          Culture - Urine (collected 12-17-20 @ 20:08)  Source: .Urine Catheterized  Final Report (12-18-20 @ 15:59):    No growth    Culture - Catheter (collected 12-15-20 @ 16:43)  Source: .Catheter PICC line tip  Final Report (12-17-20 @ 18:03):    < 15 colonies Coag Negative Staphylococcus    Culture - Urine (collected 12-15-20 @ 16:28)  Source: .Urine Clean Catch (Midstream)  Final Report (12-16-20 @ 12:12):    <10,000 CFU/mL Normal Urogenital Shantel    Culture - Blood (collected 12-14-20 @ 23:13)  Source: .Blood Blood-Peripheral  Final Report (12-20-20 @ 01:01):    No Growth Final    Culture - Blood (collected 12-14-20 @ 23:13)  Source: .Blood Blood-Peripheral  Final Report (12-20-20 @ 01:01):    No Growth Final    Culture - Blood (collected 12-14-20 @ 23:13)  Source: .Blood PICC/PERC Single Lumen  Final Report (12-20-20 @ 01:01):    No Growth Final            Review of systems  Gen: tired  Skin:  R heel wound   Head/Eyes/Ears/Mouth: No headache; Normal hearing; Normal vision w/o blurriness; No sinus pain/discomfort, sore throat  Respiratory: No dyspnea, cough, wheezing, hemoptysis  CV: No chest pain, PND, orthopnea  GI:  denies abdominal pain, diarrhea, constipation, nausea, vomiting, melena, hematochezia  : No increased frequency, dysuria, hematuria, nocturia  MSK: No joint pain/swelling; no back pain; no edema  Neuro: No dizziness/lightheadedness, weakness, seizures, numbness, tingling  Heme: No easy bruising or bleeding  Endo: No heat/cold intolerance  Psych: No significant nervousness, anxiety, stress, depression  All other systems were reviewed and are negative, except as noted.      PHYSICAL EXAM:  Constitutional: Well developed / well nourished  Eyes: Anicteric, PERRLA  ENMT: nc/at  Respiratory: CTA B/L  Cardiovascular: RRR, + heart murmur  Gastrointestinal: Soft, non distended, non tender, incision well healed  Genitourinary: Voiding spontaneously  Extremities: SCD's in place and working bilaterally  Vascular: Palpable dp pulses bilaterally  Neurological: A&O x3  Skin: R heel ulcer: no drainage, no cellulitis   Musculoskeletal: Moving all extremities  Psychiatric: Responsive     Transplant Surgery - Multidisciplinary Rounds  --------------------------------------------------------------  s/p OLT 7/2/2020      Admitted 12/14/2020 with elevated LFTs, MISA, diarrhea    Present:  Patient seen and examined with multidisciplinary team including Transplant Surgeon: Dr. Gray, Transplant Nephrologist: Dr. Rowland, OLY Burciaga and unit RN during am rounds. Disciplines not in attendance will be notified of the plan.      HPI: 65M with PMHx of IDDM, HLD, obesity, HFpEF with mild LV diastolic dysfunction, MGUS, chronic anemia with a history of duodenal ulcer 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  Underwent OLT on 7/2/2020, post op course c/b VRE bacteremia tx with linezolid and delirium likely in setting of CNI toxicity (FK discontinued/Everolimus initiated 7/27).    Recent admission from 11/20-12/2 for OM of the R heel, s/p debridement with Podiatry, and was discharged with Cefepime x 6 weeks via PICC line (placed 12/1). Initial wound cx on admission grew Pseudomonas, OR cx grew staph epi. Was also found with neutropenia and Valcyte was reduced (was on 900mg daily CMV +/-).      Sent to ED from clinic with rising transaminitis and MISA (SCr 3.3 from 1.5). Hospital course c/b C diff colitis and worsening renal function.     Interval Events:  - Febrile to 100.8 this AM, blood cultures sent.   -4 BMs yesterday and 1 overnight and 1 this AM, per pt and nurse, BM overnight and this AM was soft.   - Anbx: remains on meropenem, PO vanco, Ofloxacin (R OE)  - Worsening renal function, concern for uremic encephalopathy: Shiley was placed yesterday, s/p HD, no fluid removal. BUN 72 this AM from 90 yesterday.  -Started weekly procrit yesterday for anemia.   -Bicarb infusion discontinued this AM.   - AO x 3 (person/time/place), + encephalopathy (hallucinations)  - LFTS improving; ammonia 29    Potential Discharge date: pending clinical improvement     Education:  Medications    Plan of care:  See Below      MEDICATIONS  (STANDING):  aspirin  chewable 81 milliGRAM(s) Oral daily  chlorhexidine 2% Cloths 1 Application(s) Topical <User Schedule>  epoetin barry-epbx (RETACRIT) Injectable 70297 Unit(s) IV Push every 7 days  everolimus (ZORTRESS) 1.5 milliGRAM(s) Oral <User Schedule>  heparin   Injectable 5000 Unit(s) SubCutaneous every 12 hours  insulin glargine Injectable (LANTUS) 10 Unit(s) SubCutaneous at bedtime  insulin lispro (ADMELOG) corrective regimen sliding scale   SubCutaneous three times a day before meals  insulin lispro (ADMELOG) corrective regimen sliding scale   SubCutaneous at bedtime  insulin lispro Injectable (ADMELOG) 5 Unit(s) SubCutaneous three times a day before meals  magnesium oxide 400 milliGRAM(s) Oral daily  meropenem  IVPB 500 milliGRAM(s) IV Intermittent <User Schedule>  nystatin    Suspension 868011 Unit(s) Oral four times a day  ofloxacin 0.3% Solution 10 Drop(s) Right Ear two times a day  pantoprazole    Tablet 40 milliGRAM(s) Oral before breakfast  predniSONE   Tablet 5 milliGRAM(s) Oral two times a day  sodium bicarbonate 1300 milliGRAM(s) Oral three times a day  tamsulosin 0.4 milliGRAM(s) Oral at bedtime  valGANciclovir 450 milliGRAM(s) Oral <User Schedule>  vancomycin    Solution 125 milliGRAM(s) Oral every 6 hours    MEDICATIONS  (PRN):      PAST MEDICAL & SURGICAL HISTORY:  GIB (gastrointestinal bleeding)    GERD with esophagitis  Gastritis &amp; Non Bleeding Ulcers    Hepatic encephalopathy    Obesity    Fatty liver disease, nonalcoholic    Renal stones  25 years ago    Hypertension    Neuropathy    Hypercholesteremia    Diabetes    S/P cholecystectomy        Vital Signs Last 24 Hrs  T(C): 38.2 (20 Dec 2020 07:00), Max: 38.2 (20 Dec 2020 07:00)  T(F): 100.8 (20 Dec 2020 07:00), Max: 100.8 (20 Dec 2020 07:00)  HR: 82 (20 Dec 2020 07:23) (75 - 95)  BP: 170/80 (20 Dec 2020 07:23) (148/68 - 170/80)  BP(mean): 115 (20 Dec 2020 07:23) (95 - 115)  RR: 16 (20 Dec 2020 07:23) (7 - 23)  SpO2: 93% (20 Dec 2020 07:23) (89% - 98%)    I&O's Summary    19 Dec 2020 07:01  -  20 Dec 2020 07:00  --------------------------------------------------------  IN: 1300 mL / OUT: 1600 mL / NET: -300 mL                              7.5    4.29  )-----------( 170      ( 20 Dec 2020 06:05 )             22.4     12-20    137  |  102  |  72<H>  ----------------------------<  234<H>  4.3   |  22  |  5.62<H>    Ca    8.3<L>      20 Dec 2020 06:05  Phos  4.2     12-20  Mg     2.0     12-20    TPro  5.7<L>  /  Alb  2.7<L>  /  TBili  0.2  /  DBili  x   /  AST  34  /  ALT  41  /  AlkPhos  206<H>  12-20          Culture - Urine (collected 12-17-20 @ 20:08)  Source: .Urine Catheterized  Final Report (12-18-20 @ 15:59):    No growth    Culture - Catheter (collected 12-15-20 @ 16:43)  Source: .Catheter PICC line tip  Final Report (12-17-20 @ 18:03):    < 15 colonies Coag Negative Staphylococcus    Culture - Urine (collected 12-15-20 @ 16:28)  Source: .Urine Clean Catch (Midstream)  Final Report (12-16-20 @ 12:12):    <10,000 CFU/mL Normal Urogenital Shantel    Culture - Blood (collected 12-14-20 @ 23:13)  Source: .Blood Blood-Peripheral  Final Report (12-20-20 @ 01:01):    No Growth Final    Culture - Blood (collected 12-14-20 @ 23:13)  Source: .Blood Blood-Peripheral  Final Report (12-20-20 @ 01:01):    No Growth Final    Culture - Blood (collected 12-14-20 @ 23:13)  Source: .Blood PICC/PERC Single Lumen  Final Report (12-20-20 @ 01:01):    No Growth Final            Review of systems  Gen: tired  Skin:  R heel wound   Head/Eyes/Ears/Mouth: No headache; Normal hearing; Normal vision w/o blurriness; No sinus pain/discomfort, sore throat  Respiratory: No dyspnea, cough, wheezing, hemoptysis  CV: No chest pain, PND, orthopnea  GI:  denies abdominal pain, diarrhea, constipation, nausea, vomiting, melena, hematochezia  : No increased frequency, dysuria, hematuria, nocturia  MSK: No joint pain/swelling; no back pain; no edema  Neuro: No dizziness/lightheadedness, weakness, seizures, numbness, tingling  Heme: No easy bruising or bleeding  Endo: No heat/cold intolerance  Psych: No significant nervousness, anxiety, stress, depression  All other systems were reviewed and are negative, except as noted.      PHYSICAL EXAM:  Constitutional: Well developed / well nourished  Eyes: Anicteric, PERRLA  ENMT: nc/at  Respiratory: CTA B/L  Cardiovascular: RRR, + heart murmur  Gastrointestinal: Soft, non distended, non tender, incision well healed  Genitourinary: Voiding spontaneously  Extremities: SCD's in place and working bilaterally  Vascular: Palpable dp pulses bilaterally  Neurological: A&O x3  Skin: R heel ulcer: no drainage, no cellulitis   Musculoskeletal: Moving all extremities  Psychiatric: Responsive

## 2020-12-20 NOTE — PROGRESS NOTE ADULT - SUBJECTIVE AND OBJECTIVE BOX
HISTORY  65y Male with PMHx of IDDM, HLD, obesity, HFpEF with mild LV diastolic dysfunction, MGUS, chronic anemia with a history of duodenal ulcer 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  Underwent OLT on 7/2/2020, post op course c/b VRE bacteremia tx with linezolid and delirium likely in setting of CNI toxicity (FK discontinued/Everolimus initiated 7/27).    Recent admission from 11/20-12/2 for OM of the R heel, s/p debridement with Podiatry, and was discharged with Cefepime x 6 weeks via PICC line (placed 12/1). Initial wound cx on admission grew Pseudomonas, OR cx grew staph epi. Was also found with neutropenia and Valcyte was reduced (was on 900mg daily CMV +/-).      Sent to ED from clinic with rising transaminitis and MISA (SCr 3.3 from 1.5). Hospital course complicated by C diff colitis. MISA 2/2 recent cefepime use (for OM) vs dehydration 2/2 c diff.    SICU consulted in the setting of elevated creatinine to 6.6 and increased lethargy likely in the setting of uremic encephalopathy. Transferred to SICU for hemodynamic and mental status monitoring with plan for possible HD catheter placement and initiation of HD. On 12/19, R IJ shiley placed and HD initiated for uremia.    24 HOUR EVENTS:  -R IJ shiley  -net zero first HD session, tolerated well      SUBJECTIVE/ROS:  [ ] A ten-point review of systems was otherwise negative except as noted.  [ ] Due to altered mental status/intubation, subjective information were not able to be obtained from the patient. History was obtained, to the extent possible, from review of the chart and collateral sources of information.      NEURO  RASS:     GCS:     CAM ICU:  Exam: awake, alert, oriented  Meds:   [x] Adequacy of sedation and pain control has been assessed and adjusted      RESPIRATORY  RR: 19 (12-19-20 @ 23:00) (15 - 20)  SpO2: 95% (12-19-20 @ 23:00) (93% - 98%)  Wt(kg): --  Exam: unlabored, clear to auscultation bilaterally  Mechanical Ventilation:     [N/A] Extubation Readiness Assessed  Meds:       CARDIOVASCULAR  HR: 94 (12-19-20 @ 23:00) (75 - 94)  BP: 149/66 (12-19-20 @ 23:00) (136/63 - 163/70)  BP(mean): 95 (12-19-20 @ 23:00) (90 - 106)  ABP: --  ABP(mean): --  Wt(kg): --  CVP(cm H2O): --      Exam: regular rate and rhythm  Cardiac Rhythm: sinus  Perfusion     [x]Adequate   [ ]Inadequate  Mentation   [x]Normal       [ ]Reduced  Extremities  [x]Warm         [ ]Cool  Volume Status [ ]Hypervolemic [x]Euvolemic [ ]Hypovolemic  Meds: tamsulosin 0.4 milliGRAM(s) Oral at bedtime        GI/NUTRITION  Exam: soft, nontender, nondistended, incision C/D/I  Diet:  Meds: pantoprazole    Tablet 40 milliGRAM(s) Oral before breakfast      GENITOURINARY  I&O's Detail    12-18 @ 07:01  -  12-19 @ 07:00  --------------------------------------------------------  IN:    Sodium Bicarbonate: 750 mL  Total IN: 750 mL    OUT:    Voided (mL): 750 mL  Total OUT: 750 mL    Total NET: 0 mL      12-19 @ 07:01  -  12-20 @ 00:08  --------------------------------------------------------  IN:    IV PiggyBack: 50 mL    Sodium Bicarbonate: 1050 mL  Total IN: 1100 mL    OUT:    Other (mL): 0 mL    Voided (mL): 1250 mL  Total OUT: 1250 mL    Total NET: -150 mL          12-19    133<L>  |  101  |  92<H>  ----------------------------<  224<H>  4.7   |  16<L>  |  6.94<H>    Ca    8.2<L>      19 Dec 2020 05:29  Phos  4.6     12-19  Mg     2.2     12-19    TPro  5.8<L>  /  Alb  2.6<L>  /  TBili  0.1<L>  /  DBili  x   /  AST  31  /  ALT  47<H>  /  AlkPhos  222<H>  12-19    [ ] Beasley catheter, indication: N/A  Meds: magnesium oxide 400 milliGRAM(s) Oral daily  sodium bicarbonate 1300 milliGRAM(s) Oral three times a day  sodium bicarbonate  Infusion 0.05 mEq/kG/Hr IV Continuous <Continuous>        HEMATOLOGIC  Meds: aspirin  chewable 81 milliGRAM(s) Oral daily  heparin   Injectable 5000 Unit(s) SubCutaneous every 12 hours    [x] VTE Prophylaxis                        7.4    4.50  )-----------( 179      ( 19 Dec 2020 05:29 )             22.9     PT/INR - ( 19 Dec 2020 05:29 )   PT: 13.1 sec;   INR: 1.10 ratio         PTT - ( 19 Dec 2020 05:29 )  PTT:26.1 sec  Transfusion     [ ] PRBC   [ ] Platelets   [ ] FFP   [ ] Cryoprecipitate      INFECTIOUS DISEASES  WBC Count: 4.50 K/uL (12-19 @ 05:29)    RECENT CULTURES:  Specimen Source: .Urine Catheterized  Date/Time: 12-17 @ 20:08  Culture Results:   No growth  Gram Stain: --  Organism: --  Specimen Source: .Catheter PICC line tip  Date/Time: 12-15 @ 16:43  Culture Results:   < 15 colonies Coag Negative Staphylococcus  Gram Stain: --  Organism: --  Specimen Source: .Urine Clean Catch (Midstream)  Date/Time: 12-15 @ 16:28  Culture Results:   <10,000 CFU/mL Normal Urogenital Shantel  Gram Stain: --  Organism: --    Meds: epoetin barry-epbx (RETACRIT) Injectable 31278 Unit(s) IV Push every 7 days  everolimus (ZORTRESS) 1.5 milliGRAM(s) Oral <User Schedule>  meropenem  IVPB 500 milliGRAM(s) IV Intermittent <User Schedule>  nystatin    Suspension 040857 Unit(s) Oral four times a day  valGANciclovir 450 milliGRAM(s) Oral <User Schedule>  vancomycin    Solution 125 milliGRAM(s) Oral every 6 hours        ENDOCRINE  CAPILLARY BLOOD GLUCOSE      POCT Blood Glucose.: 155 mg/dL (19 Dec 2020 22:02)  POCT Blood Glucose.: 187 mg/dL (19 Dec 2020 17:22)  POCT Blood Glucose.: 164 mg/dL (19 Dec 2020 12:10)  POCT Blood Glucose.: 245 mg/dL (19 Dec 2020 08:31)    Meds: insulin glargine Injectable (LANTUS) 10 Unit(s) SubCutaneous at bedtime  insulin lispro (ADMELOG) corrective regimen sliding scale   SubCutaneous three times a day before meals  insulin lispro (ADMELOG) corrective regimen sliding scale   SubCutaneous at bedtime  insulin lispro Injectable (ADMELOG) 5 Unit(s) SubCutaneous three times a day before meals  predniSONE   Tablet 5 milliGRAM(s) Oral two times a day        ACCESS DEVICES:  [ ] Peripheral IV  [ ] Central Venous Line	[ ] R	[ ] L	[ ] IJ	[ ] Fem	[ ] SC	Placed:   [ ] Arterial Line		[ ] R	[ ] L	[ ] Fem	[ ] Rad	[ ] Ax	Placed:   [ ] PICC:					[ ] Mediport  [ ] Urinary Catheter, Date Placed:   [x] Necessity of urinary, arterial, and venous catheters discussed    OTHER MEDICATIONS:  chlorhexidine 2% Cloths 1 Application(s) Topical <User Schedule>  ofloxacin 0.3% Solution 10 Drop(s) Right Ear two times a day      CODE STATUS:      IMAGING: HISTORY  65y Male with PMHx of IDDM, HLD, obesity, HFpEF with mild LV diastolic dysfunction, MGUS, chronic anemia with a history of duodenal ulcer 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  Underwent OLT on 7/2/2020, post op course c/b VRE bacteremia tx with linezolid and delirium likely in setting of CNI toxicity (FK discontinued/Everolimus initiated 7/27).    Recent admission from 11/20-12/2 for OM of the R heel, s/p debridement with Podiatry, and was discharged with Cefepime x 6 weeks via PICC line (placed 12/1). Initial wound cx on admission grew Pseudomonas, OR cx grew staph epi. Was also found with neutropenia and Valcyte was reduced (was on 900mg daily CMV +/-).      Sent to ED from clinic with rising transaminitis and MISA (SCr 3.3 from 1.5). Hospital course complicated by C diff colitis. MISA 2/2 recent cefepime use (for OM) vs dehydration 2/2 c diff.    SICU consulted in the setting of elevated creatinine to 6.6 and increased lethargy likely in the setting of uremic encephalopathy. Transferred to SICU for hemodynamic and mental status monitoring with plan for possible HD catheter placement and initiation of HD. On 12/19, R IJ shiley placed and HD initiated for uremia.    24 HOUR EVENTS:  -s/p R IJ shiley  -net zero first HD session, tolerated well  -bicarb gtt stopped    SUBJECTIVE/ROS:  [x] A ten-point review of systems was otherwise negative except as noted.  [ ] Due to altered mental status/intubation, subjective information were not able to be obtained from the patient. History was obtained, to the extent possible, from review of the chart and collateral sources of information.      NEURO  RASS:     GCS: 15    CAM ICU:  Exam: awake, alert, oriented  Meds:   [x] Adequacy of sedation and pain control has been assessed and adjusted      RESPIRATORY  RR: 19 (12-19-20 @ 23:00) (15 - 20)  SpO2: 95% (12-19-20 @ 23:00) (93% - 98%)  Wt(kg): --  Exam: unlabored, clear to auscultation bilaterally  Mechanical Ventilation:     [N/A] Extubation Readiness Assessed  Meds:       CARDIOVASCULAR  HR: 94 (12-19-20 @ 23:00) (75 - 94)  BP: 149/66 (12-19-20 @ 23:00) (136/63 - 163/70)  BP(mean): 95 (12-19-20 @ 23:00) (90 - 106)  ABP: --  ABP(mean): --  Wt(kg): --  CVP(cm H2O): --      Exam: regular rate and rhythm  Cardiac Rhythm: sinus  Perfusion     [x]Adequate   [ ]Inadequate  Mentation   [x]Normal       [ ]Reduced  Extremities  [x]Warm         [ ]Cool  Volume Status [ ]Hypervolemic [x]Euvolemic [ ]Hypovolemic  Meds: tamsulosin 0.4 milliGRAM(s) Oral at bedtime        GI/NUTRITION  Exam: soft, nontender, nondistended, incision C/D/I  Diet:  CC diet  Meds: pantoprazole    Tablet 40 milliGRAM(s) Oral before breakfast      GENITOURINARY  I&O's Detail    12-18 @ 07:01  -  12-19 @ 07:00  --------------------------------------------------------  IN:    Sodium Bicarbonate: 750 mL  Total IN: 750 mL    OUT:    Voided (mL): 750 mL  Total OUT: 750 mL    Total NET: 0 mL      12-19 @ 07:01  -  12-20 @ 00:08  --------------------------------------------------------  IN:    IV PiggyBack: 50 mL    Sodium Bicarbonate: 1050 mL  Total IN: 1100 mL    OUT:    Other (mL): 0 mL    Voided (mL): 1250 mL  Total OUT: 1250 mL    Total NET: -150 mL          12-19    133<L>  |  101  |  92<H>  ----------------------------<  224<H>  4.7   |  16<L>  |  6.94<H>    Ca    8.2<L>      19 Dec 2020 05:29  Phos  4.6     12-19  Mg     2.2     12-19    TPro  5.8<L>  /  Alb  2.6<L>  /  TBili  0.1<L>  /  DBili  x   /  AST  31  /  ALT  47<H>  /  AlkPhos  222<H>  12-19    [ ] Beasley catheter, indication: N/A  Meds: magnesium oxide 400 milliGRAM(s) Oral daily  sodium bicarbonate 1300 milliGRAM(s) Oral three times a day  sodium bicarbonate  Infusion 0.05 mEq/kG/Hr IV Continuous <Continuous>        HEMATOLOGIC  Meds: aspirin  chewable 81 milliGRAM(s) Oral daily  heparin   Injectable 5000 Unit(s) SubCutaneous every 12 hours    [x] VTE Prophylaxis                        7.4    4.50  )-----------( 179      ( 19 Dec 2020 05:29 )             22.9     PT/INR - ( 19 Dec 2020 05:29 )   PT: 13.1 sec;   INR: 1.10 ratio         PTT - ( 19 Dec 2020 05:29 )  PTT:26.1 sec  Transfusion     [ ] PRBC   [ ] Platelets   [ ] FFP   [ ] Cryoprecipitate      INFECTIOUS DISEASES  WBC Count: 4.50 K/uL (12-19 @ 05:29)    RECENT CULTURES:  Specimen Source: .Urine Catheterized  Date/Time: 12-17 @ 20:08  Culture Results:   No growth  Gram Stain: --  Organism: --  Specimen Source: .Catheter PICC line tip  Date/Time: 12-15 @ 16:43  Culture Results:   < 15 colonies Coag Negative Staphylococcus  Gram Stain: --  Organism: --  Specimen Source: .Urine Clean Catch (Midstream)  Date/Time: 12-15 @ 16:28  Culture Results:   <10,000 CFU/mL Normal Urogenital Shantel  Gram Stain: --  Organism: --    Meds: epoetin barry-epbx (RETACRIT) Injectable 64036 Unit(s) IV Push every 7 days  everolimus (ZORTRESS) 1.5 milliGRAM(s) Oral <User Schedule>  meropenem  IVPB 500 milliGRAM(s) IV Intermittent <User Schedule>  nystatin    Suspension 386827 Unit(s) Oral four times a day  valGANciclovir 450 milliGRAM(s) Oral <User Schedule>  vancomycin    Solution 125 milliGRAM(s) Oral every 6 hours        ENDOCRINE  CAPILLARY BLOOD GLUCOSE      POCT Blood Glucose.: 155 mg/dL (19 Dec 2020 22:02)  POCT Blood Glucose.: 187 mg/dL (19 Dec 2020 17:22)  POCT Blood Glucose.: 164 mg/dL (19 Dec 2020 12:10)  POCT Blood Glucose.: 245 mg/dL (19 Dec 2020 08:31)    Meds: insulin glargine Injectable (LANTUS) 10 Unit(s) SubCutaneous at bedtime  insulin lispro (ADMELOG) corrective regimen sliding scale   SubCutaneous three times a day before meals  insulin lispro (ADMELOG) corrective regimen sliding scale   SubCutaneous at bedtime  insulin lispro Injectable (ADMELOG) 5 Unit(s) SubCutaneous three times a day before meals  predniSONE   Tablet 5 milliGRAM(s) Oral two times a day        ACCESS DEVICES:  [x] Peripheral IV  [x] Central Venous Line	[x] R	[ ] L	[x] IJ	[ ] Fem	[ ] SC	Placed:   [ ] Arterial Line		[ ] R	[ ] L	[ ] Fem	[ ] Rad	[ ] Ax	Placed:   [ ] PICC:					[ ] Mediport  [ ] Urinary Catheter, Date Placed:   [x] Necessity of urinary, arterial, and venous catheters discussed    OTHER MEDICATIONS:  chlorhexidine 2% Cloths 1 Application(s) Topical <User Schedule>  ofloxacin 0.3% Solution 10 Drop(s) Right Ear two times a day      CODE STATUS:      IMAGING:

## 2020-12-20 NOTE — PROGRESS NOTE ADULT - ASSESSMENT
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, CNI Neurotoxicity), IDDM, hyperlipidemia, obesity, HFpEF with mild LV diastolic dysfunction, MGUS who presented with fevers, diarrhea and abnormal labwork    Of note, recent admission for R Heel Osteomyelitis   s/p right foot heel incision and drainage w/ application of stravix and bone biopsy on 11/24  Superficial cultures with Pseudomonas and Operative Cultures with Coag Neg Staph in fluid media  Discharged on IV Cefepime with plan for 6 week course    On 12/14 labwork was noted to have MISA and Transaminitis  Of note [facility] outpatient labs from 12/9 with Cr of 1.41 and LFT's WNL    U/A here with pyuria, UCx with NGTD  COVID19 PCR Negative  C diff toxin assay indeterminate with Positive C diff PCR  CXR Clear   CT A/P with distal left ureteral calculus without hydronephrosis and Diffuse wall thickening, slightly asymmetric on the left but without prostate abscess.   Renal US with left collecting system fullness but no hydronephrosis    The patient's heel appears without cellulitis or drainage so I doubt that this is the source of his fever.   Given the negative UCx believe we can monitor off of Daptomycin (No VRE growth)    Would continue Meropenem at this point (given prior ESBL E coli recurrent UTI)  Would continue PO Vancomycin  Given the negative UCx believe we can monitor off of Daptomycin (No VRE growth)    Question of AIN raised - urine eosinophils were positive    Overall, C diff infection, Fever, Transaminitis, MISA, Abnormal Urinalysis, Nephrolithiasis, Heel Osteomyelitis, Liver Transplant Recipient    # Acute kidney injury / Abnormal Urinalysis / Nephrolithiasis   - CT abdomen and pelvis showing 5 mm stone   - Renal US with left collecting system fullness but no hydronephrosis  - Urine Eosinophils positive (?AIN)  - Hold bactrim  - UCx with NGTD  - Continue Meropenem 500 mg IV q24 hours    # Fever / Transaminitis  - Daptomycin discontinued  - Continue Meropenem 500 mg IV q24 hours  - Follow up on prelim BCx  - Follow PICC line tip culture  - CMV PCR pending   - Repeat BCx, Procalcitonin, Fungitell sent - followup on result     # C. diff infection  - Continue oral vancomycin 125 mg PO q6 hours  - If worsening diarrhea or ileus would increase Vancomycin to 500 mg PO Q6H    # RLE Heel OM  - Decrease meropenem to 500 mg IV q24 hours  - Monitor fever curve  - Trend CBC daily  - ID will continue to follow    #Liver Transplant Recipient  - Continue Valcyte Twice weekly given drop in renal function     I will continue to follow. Please feel free to contact me with any further questions.    Eusebio Pérez M.D.  [facility] Division of Infectious Disease  8AM-5PM: Pager Number [phone]  After Hours (or if no response): Please contact the Infectious Diseases Office at [phone]     The above assessment and plan were discussed with transplant surgery PA

## 2020-12-20 NOTE — PROGRESS NOTE ADULT - ASSESSMENT
65 yr old man with liver transplant in July 2020 on everolimus and steroids, cellcept on hold due to infection.   Right plantar osteo s/p debridement on IV cefepime for ~2 weeks s/p graft, appears clean and no signs of infection   He is admitted with MISA. Serum creatinine was 1.3-1.5mg/dL on previous admission few weeks ago.   Stool is +ve for C.diff , no olga diarrhea but has loose stools. U/A with leukocytes on admission but cultures remain negative.   Non obstructive left distal ureteric stone on CT. U/S with enlarged kidneys, no olga hydro       MISA due to AIN from cefepime vs ATN in the setting of sepsis.    HD done yesterday without ultrafiltration. Azotemia improving, Remains non oliguric, electrolytes are fair, no signs of fluid overload.   - No need for dialysis today, will continue to monitor daily chem and UOP   - Continue sodium bicarb 1300mg po TID for metabolic acidosis   - Repeat blood and urine culture, ID follow up  - Anemia - continue retacrit 10,000 units sq weekly

## 2020-12-20 NOTE — PROGRESS NOTE ADULT - SUBJECTIVE AND OBJECTIVE BOX
Follow Up:  s/p OLT, C diff, Transaminitis, Abnormal U/A    Interval History:    REVIEW OF SYSTEMS  [  ] ROS unobtainable because:    [  ] All other systems negative except as noted below    Constitutional:  [ ] fever [ ] chills  [ ] weight loss  [ ] weakness  Skin:  [ ] rash [ ] phlebitis	  Eyes: [ ] icterus [ ] pain  [ ] discharge	  ENMT: [ ] sore throat  [ ] thrush [ ] ulcers [ ] exudates  Respiratory: [ ] dyspnea [ ] hemoptysis [ ] cough [ ] sputum	  Cardiovascular:  [ ] chest pain [ ] palpitations [ ] edema	  Gastrointestinal:  [ ] nausea [ ] vomiting [ ] diarrhea [ ] constipation [ ] pain	  Genitourinary:  [ ] dysuria [ ] frequency [ ] hematuria [ ] discharge [ ] flank pain  [ ] incontinence  Musculoskeletal:  [ ] myalgias [ ] arthralgias [ ] arthritis  [ ] back pain  Neurological:  [ ] headache [ ] seizures  [ ] confusion/altered mental status    Allergies  codeine (Anaphylaxis)        ANTIMICROBIALS:  meropenem  IVPB 500 <User Schedule>  nystatin    Suspension 071004 four times a day  valGANciclovir 450 <User Schedule>  vancomycin    Solution 125 every 6 hours      OTHER MEDS:  MEDICATIONS  (STANDING):  aspirin  chewable 81 daily  epoetin barry-epbx (RETACRIT) Injectable 46158 every 7 days  everolimus (ZORTRESS) 2 <User Schedule>  heparin   Injectable 5000 every 12 hours  insulin glargine Injectable (LANTUS) 10 at bedtime  insulin lispro (ADMELOG) corrective regimen sliding scale  three times a day before meals  insulin lispro (ADMELOG) corrective regimen sliding scale  at bedtime  insulin lispro Injectable (ADMELOG) 5 three times a day before meals  pantoprazole    Tablet 40 before breakfast  predniSONE   Tablet 5 two times a day  tamsulosin 0.4 at bedtime      Vital Signs Last 24 Hrs  T(C): 37.8 (20 Dec 2020 15:00), Max: 38.2 (20 Dec 2020 07:00)  T(F): 100 (20 Dec 2020 15:00), Max: 100.8 (20 Dec 2020 07:00)  HR: 84 (20 Dec 2020 15:00) (80 - 95)  BP: 170/77 (20 Dec 2020 15:00) (149/66 - 170/80)  BP(mean): 110 (20 Dec 2020 15:00) (95 - 115)  RR: 12 (20 Dec 2020 15:00) (7 - 23)  SpO2: 93% (20 Dec 2020 15:00) (89% - 98%)    PHYSICAL EXAMINATION:  General: Alert and Awake, NAD  HEENT: PERRL, EOMI  Neck: Supple  Cardiac: RRR, No M/R/G  Resp: CTAB, No Wh/Rh/Ra  Abdomen: NBS, NT/ND, No HSM, No rigidity or guarding  MSK: No LE edema. No Calf tenderness  : No bob  Skin: No rashes or lesions. Skin is warm and dry to the touch.   Neuro: Alert and Awake. CN 2-12 Grossly intact. Moves all four extremities spontaneously.  Psych: Calm, Pleasant, Cooperative                          7.5    4.29  )-----------( 170      ( 20 Dec 2020 06:05 )             22.4       12    137  |  102  |  72<H>  ----------------------------<  234<H>  4.3   |  22  |  5.62<H>    Ca    8.3<L>      20 Dec 2020 06:05  Phos  4.2       Mg     2.0         TPro  5.7<L>  /  Alb  2.7<L>  /  TBili  0.2  /  DBili  x   /  AST  34  /  ALT  41  /  AlkPhos  206<H>        Urinalysis Basic - ( 20 Dec 2020 10:18 )    Color: Yellow / Appearance: Slightly Turbid / S.010 / pH: x  Gluc: x / Ketone: Negative  / Bili: Negative / Urobili: Negative   Blood: x / Protein: 100 / Nitrite: Negative   Leuk Esterase: Large / RBC: 9 /hpf /  /HPF   Sq Epi: x / Non Sq Epi: 0 /hpf / Bacteria: Few        MICROBIOLOGY:  v  .Urine Catheterized  20   No growth  --  --      .Catheter PICC line tip  12-15-20   < 15 colonies Coag Negative Staphylococcus  --  --      .Urine Clean Catch (Midstream)  12-15-20   <10,000 CFU/mL Normal Urogenital Shantel  --  --      .Blood PICC/PERC Single Lumen  20   No Growth Final  --  --      .Tissue Other  20   Growth in fluid media only Staphylococcus epidermidis  "Susceptibilities not performed"  --  Staphylococcus epidermidis      Skin wound  20   Few Pseudomonas aeruginosa  Normal skin shantel isolated  --  Pseudomonas aeruginosa      .Blood Blood-Peripheral  20   No Growth Final  --  --          Rapid RVP Result: NotDetec ( @ 10:04)  Rapid RVP Result: NotDetec ( @ 18:36)  C Diff by PCR Result: Detected (12-15 @ 14:53)    C Diff by PCR Result: Detected (12-15-20 @ 14:53)  Clostridium difficile GDH Toxins A&amp;B, EIA:   Indeterminate (12-15-20 @ 11:23)  Clostridium difficile GDH Interpretation: This specimen is positive for C. Difficile glutamate dehydrogenase (GDH)  antigen and negative for C. Difficile Toxins A & B, by EIA.  GDH is a  highly sensitive screening marker for C. Difficile that is produced in  large amounts by all C. Difficilestrains, both toxigenic and  nontoxigenic.  Specimens that are GDH positive and toxin negative will be  tested further by PCR to detect toxin gene sequences associated with  toxin producing C. Difficle. (12-15-20 @ 11:23)      RADIOLOGY:    <The imaging below has been reviewed and visualized by me independently. Findings as detailed in report below> Follow Up:  s/p OLT, C diff, Transaminitis, Abnormal U/A    Interval History: low grade fever overnight. loose stools overnight. denies abdominal pain.     REVIEW OF SYSTEMS  [  ] ROS unobtainable because:    [x  ] All other systems negative except as noted below    Constitutional:  [ x] fever [ ] chills  [ ] weight loss  [ ] weakness  Skin:  [ ] rash [ ] phlebitis	  Eyes: [ ] icterus [ ] pain  [ ] discharge	  ENMT: [ ] sore throat  [ ] thrush [ ] ulcers [ ] exudates  Respiratory: [ ] dyspnea [ ] hemoptysis [ ] cough [ ] sputum	  Cardiovascular:  [ ] chest pain [ ] palpitations [ ] edema	  Gastrointestinal:  [ ] nausea [ ] vomiting [ ] diarrhea [ ] constipation [ ] pain +loose stools	  Genitourinary:  [ ] dysuria [ ] frequency [ ] hematuria [ ] discharge [ ] flank pain  [ ] incontinence  Musculoskeletal:  [ ] myalgias [ ] arthralgias [ ] arthritis  [ ] back pain  Neurological:  [ ] headache [ ] seizures  [ ] confusion/altered mental status    Allergies  codeine (Anaphylaxis)        ANTIMICROBIALS:  meropenem  IVPB 500 <User Schedule>  nystatin    Suspension 000156 four times a day  valGANciclovir 450 <User Schedule>  vancomycin    Solution 125 every 6 hours      OTHER MEDS:  MEDICATIONS  (STANDING):  aspirin  chewable 81 daily  epoetin barry-epbx (RETACRIT) Injectable 18621 every 7 days  everolimus (ZORTRESS) 2 <User Schedule>  heparin   Injectable 5000 every 12 hours  insulin glargine Injectable (LANTUS) 10 at bedtime  insulin lispro (ADMELOG) corrective regimen sliding scale  three times a day before meals  insulin lispro (ADMELOG) corrective regimen sliding scale  at bedtime  insulin lispro Injectable (ADMELOG) 5 three times a day before meals  pantoprazole    Tablet 40 before breakfast  predniSONE   Tablet 5 two times a day  tamsulosin 0.4 at bedtime      Vital Signs Last 24 Hrs  T(C): 37.8 (20 Dec 2020 15:00), Max: 38.2 (20 Dec 2020 07:00)  T(F): 100 (20 Dec 2020 15:00), Max: 100.8 (20 Dec 2020 07:00)  HR: 84 (20 Dec 2020 15:00) (80 - 95)  BP: 170/77 (20 Dec 2020 15:00) (149/66 - 170/80)  BP(mean): 110 (20 Dec 2020 15:00) (95 - 115)  RR: 12 (20 Dec 2020 15:00) (7 - 23)  SpO2: 93% (20 Dec 2020 15:00) (89% - 98%)    PHYSICAL EXAMINATION:  General: Alert and Awake, NAD  HEENT: PERRL, EOMI  Neck: Supple  Cardiac: RRR, No M/R/G  Resp: CTAB, No Wh/Rh/Ra  Abdomen: NBS, NT/ND, No HSM, No rigidity or guarding  MSK: +RLE foot with overlying dressing. No LE edema. No Calf tenderness  : No bob  Skin: No rashes or lesions. Skin is warm and dry to the touch.   Neuro: Alert and Awake. CN 2-12 Grossly intact. Moves all four extremities spontaneously.  Psych: Calm, Pleasant, Cooperative                          7.5    4.29  )-----------( 170      ( 20 Dec 2020 06:05 )             22.4       12-    137  |  102  |  72<H>  ----------------------------<  234<H>  4.3   |  22  |  5.62<H>    Ca    8.3<L>      20 Dec 2020 06:05  Phos  4.2     12  Mg     2.0         TPro  5.7<L>  /  Alb  2.7<L>  /  TBili  0.2  /  DBili  x   /  AST  34  /  ALT  41  /  AlkPhos  206<H>  12-20      Urinalysis Basic - ( 20 Dec 2020 10:18 )    Color: Yellow / Appearance: Slightly Turbid / S.010 / pH: x  Gluc: x / Ketone: Negative  / Bili: Negative / Urobili: Negative   Blood: x / Protein: 100 / Nitrite: Negative   Leuk Esterase: Large / RBC: 9 /hpf /  /HPF   Sq Epi: x / Non Sq Epi: 0 /hpf / Bacteria: Few        MICROBIOLOGY:  v  .Urine Catheterized  20   No growth  --  --      .Catheter PICC line tip  12-15-20   < 15 colonies Coag Negative Staphylococcus  --  --      .Urine Clean Catch (Midstream)  12-15-20   <10,000 CFU/mL Normal Urogenital Shantel  --  --      .Blood PICC/PERC Single Lumen  20   No Growth Final  --  --      .Tissue Other  20   Growth in fluid media only Staphylococcus epidermidis  "Susceptibilities not performed"  --  Staphylococcus epidermidis      Skin wound  20   Few Pseudomonas aeruginosa  Normal skin shantel isolated  --  Pseudomonas aeruginosa      .Blood Blood-Peripheral  20   No Growth Final  --  --          Rapid RVP Result: NotDetec ( @ 10:04)  Rapid RVP Result: NotDetec ( @ 18:36)  C Diff by PCR Result: Detected (12-15 @ 14:53)    C Diff by PCR Result: Detected (12-15-20 @ 14:53)  Clostridium difficile GDH Toxins A&amp;B, EIA:   Indeterminate (12-15-20 @ 11:23)  Clostridium difficile GDH Interpretation: This specimen is positive for C. Difficile glutamate dehydrogenase (GDH)  antigen and negative for C. Difficile Toxins A & B, by EIA.  GDH is a  highly sensitive screening marker for C. Difficile that is produced in  large amounts by all C. Difficilestrains, both toxigenic and  nontoxigenic.  Specimens that are GDH positive and toxin negative will be  tested further by PCR to detect toxin gene sequences associated with  toxin producing C. Difficle. (12-15-20 @ 11:23)      RADIOLOGY:    <The imaging below has been reviewed and visualized by me independently. Findings as detailed in report below>    EXAM:  US KIDNEYS AND BLADDER                        PROCEDURE DATE:  2020    Fullness of the left collecting system.  Left upper quadrant ascites.      EXAM:  CT ABDOMEN AND PELVIS                        PROCEDURE DATE:  12/15/2020    A 5 mm distal left ureteral calculus. No associated hydronephrosis.  Diffuse wall thickening, slightly asymmetric on the left may be related to urinary tract infection. Consider follow-up cystoscopy.  No evidence of a prostatic abscess as questioned.

## 2020-12-20 NOTE — PROGRESS NOTE ADULT - ASSESSMENT
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 Recent admission from 11/20-12/2 for OM of the R heel, s/p debridement with Podiatry, and was discharged with Cefepime x 6 weeks via PICC line (placed 12/1). Sent to ED from clinic with rising transaminitis and MISA (SCr 3.3 from 1.5). Hospital course complicated by C diff colitis. MISA 2/2 recent cefepime use (for OM) vs dehydration 2/2 c diff. SICU consulted in the setting of elevated creatinine to 6.6 and increased lethargy likely in the setting of uremic encephalopathy. Transferred to SICU for hemodynamic and mental status monitoring with plan for possible HD catheter placement and initiation of HD. On 12/19, s/p R IJ shiley and initiation of HD for uremia.    PLAN:  Neuro: ?uremic encephalopathy  - Monitor mental status  - If pain develops, avoid narcotics due to AMS    Resp: no acute issues  - Monitor pulse oximeter, saturating well on room air  - Out of bed to chair, ambulate as tolerated    CV: HFpEF with mild LV diastolic dysfunction  - Monitor vital signs, currently stable    GI: duodenal ulcer, GAVE, duodenal AVM s/p APC (last on 10/11/19), decompensated JEAN/Cirrhosis s/p OLT   - Consistent carb diet  - Home protonix    Renal: MISA (baseline Cr 1.5) - AIN vs ATN, s/p R IJ shiley and HD initiation 12/19  - d/c'd bicarb gtt after HD initiated  - transplant nephro following    Heme: chronic anemia, MGUS  - daily labs  - HSq for VTE prophylaxis  - Home ASA    ID: immunosuppression in setting of recent OLT, C diff colitis, abnormal UA  - Immunosuppression with everolimus and prednisone  - f/u everolimus level before 8AM dose  - Meropenem for abnormal UA in setting of nephrolithiasis  - Valcyte and nystatin for ppx in setting of immunosuppression  - PO vancomycin for C diff  - Ofloxacin ear drops for Right otitis externa per ENT    Endo: hx DM, HLD  - insulin 5u premeal + 10u qhs, ISS    Disposition: CONTACT PRECAUTIONS (C. diff)  - Full code  - SICU

## 2020-12-20 NOTE — PROGRESS NOTE ADULT - ATTENDING COMMENTS
Dr. Mace (Attending Physician)  Acute Renal Failure - likely secondary to AIN FENa 4% Eosinophils in urine, off cefepime suspected cause, got HD yesterday  Liver Failure secondary to JEAN cirrhosis s/p Liver Transplant - LFTs improved, Alk Phos remains mildly elevated  Anemia - starting procrit  Immunosuppression with everolimus, prednisone per Transplant  C. diff on po vancomycin  T 38.2 this am will pan cx on meropenem for osteo  DM glucose slightly elevated but improved this am

## 2020-12-21 LAB
AMMONIA BLD-MCNC: 19 UMOL/L — SIGNIFICANT CHANGE UP (ref 11–55)
EVEROLIMUS, WHOLE BLOOD RESULT: 5.3 NG/ML — SIGNIFICANT CHANGE UP (ref 3–8)
GLUCOSE BLDC GLUCOMTR-MCNC: 111 MG/DL — HIGH (ref 70–99)
GLUCOSE BLDC GLUCOMTR-MCNC: 151 MG/DL — HIGH (ref 70–99)
GLUCOSE BLDC GLUCOMTR-MCNC: 205 MG/DL — HIGH (ref 70–99)
GLUCOSE BLDC GLUCOMTR-MCNC: 214 MG/DL — HIGH (ref 70–99)
GLUCOSE BLDC GLUCOMTR-MCNC: 219 MG/DL — HIGH (ref 70–99)

## 2020-12-21 PROCEDURE — 71045 X-RAY EXAM CHEST 1 VIEW: CPT | Mod: 26

## 2020-12-21 PROCEDURE — 99232 SBSQ HOSP IP/OBS MODERATE 35: CPT | Mod: GC

## 2020-12-21 PROCEDURE — 99233 SBSQ HOSP IP/OBS HIGH 50: CPT

## 2020-12-21 PROCEDURE — 99233 SBSQ HOSP IP/OBS HIGH 50: CPT | Mod: GC

## 2020-12-21 PROCEDURE — 99232 SBSQ HOSP IP/OBS MODERATE 35: CPT

## 2020-12-21 RX ORDER — INSULIN LISPRO 100/ML
5 VIAL (ML) SUBCUTANEOUS
Refills: 0 | Status: DISCONTINUED | OUTPATIENT
Start: 2020-12-21 | End: 2020-12-24

## 2020-12-21 RX ORDER — INSULIN LISPRO 100/ML
8 VIAL (ML) SUBCUTANEOUS
Refills: 0 | Status: DISCONTINUED | OUTPATIENT
Start: 2020-12-21 | End: 2020-12-21

## 2020-12-21 RX ORDER — OFLOXACIN 0.3 %
10 DROPS OPHTHALMIC (EYE)
Refills: 0 | Status: COMPLETED | OUTPATIENT
Start: 2020-12-21 | End: 2020-12-25

## 2020-12-21 RX ORDER — EVEROLIMUS 10 MG/1
2 TABLET ORAL
Refills: 0 | Status: DISCONTINUED | OUTPATIENT
Start: 2020-12-21 | End: 2020-12-29

## 2020-12-21 RX ADMIN — Medication 4: at 17:12

## 2020-12-21 RX ADMIN — EVEROLIMUS 2 MILLIGRAM(S): 10 TABLET ORAL at 21:06

## 2020-12-21 RX ADMIN — Medication 10 DROP(S): at 05:22

## 2020-12-21 RX ADMIN — EVEROLIMUS 2 MILLIGRAM(S): 10 TABLET ORAL at 08:10

## 2020-12-21 RX ADMIN — HEPARIN SODIUM 5000 UNIT(S): 5000 INJECTION INTRAVENOUS; SUBCUTANEOUS at 05:22

## 2020-12-21 RX ADMIN — Medication 125 MILLIGRAM(S): at 05:22

## 2020-12-21 RX ADMIN — Medication 1300 MILLIGRAM(S): at 05:22

## 2020-12-21 RX ADMIN — MAGNESIUM OXIDE 400 MG ORAL TABLET 400 MILLIGRAM(S): 241.3 TABLET ORAL at 11:57

## 2020-12-21 RX ADMIN — MEROPENEM 100 MILLIGRAM(S): 1 INJECTION INTRAVENOUS at 17:14

## 2020-12-21 RX ADMIN — Medication 5 UNIT(S): at 17:13

## 2020-12-21 RX ADMIN — Medication 500000 UNIT(S): at 17:13

## 2020-12-21 RX ADMIN — Medication 5 MILLIGRAM(S): at 17:14

## 2020-12-21 RX ADMIN — HEPARIN SODIUM 5000 UNIT(S): 5000 INJECTION INTRAVENOUS; SUBCUTANEOUS at 17:14

## 2020-12-21 RX ADMIN — CHLORHEXIDINE GLUCONATE 1 APPLICATION(S): 213 SOLUTION TOPICAL at 05:22

## 2020-12-21 RX ADMIN — Medication 500000 UNIT(S): at 11:56

## 2020-12-21 RX ADMIN — PANTOPRAZOLE SODIUM 40 MILLIGRAM(S): 20 TABLET, DELAYED RELEASE ORAL at 08:09

## 2020-12-21 RX ADMIN — Medication 5 MILLIGRAM(S): at 05:22

## 2020-12-21 RX ADMIN — Medication 8 UNIT(S): at 08:11

## 2020-12-21 RX ADMIN — Medication 4: at 08:10

## 2020-12-21 RX ADMIN — Medication 1300 MILLIGRAM(S): at 13:29

## 2020-12-21 RX ADMIN — Medication 500000 UNIT(S): at 05:22

## 2020-12-21 RX ADMIN — Medication 125 MILLIGRAM(S): at 17:13

## 2020-12-21 RX ADMIN — Medication 125 MILLIGRAM(S): at 11:57

## 2020-12-21 RX ADMIN — Medication 81 MILLIGRAM(S): at 11:56

## 2020-12-21 RX ADMIN — Medication 10 DROP(S): at 17:14

## 2020-12-21 NOTE — PROGRESS NOTE ADULT - SUBJECTIVE AND OBJECTIVE BOX
Chief Complaint:  Patient is a 65y old  Male who presents with a chief complaint of transaminitis (21 Dec 2020 08:53)      Interval Events: Patient agitated with staff. States he is frustrated. Admits to not feeling completely like self with some confusion.    Allergies:  codeine (Anaphylaxis)      Hospital Medications:  aspirin  chewable 81 milliGRAM(s) Oral daily  chlorhexidine 2% Cloths 1 Application(s) Topical <User Schedule>  epoetin barry-epbx (RETACRIT) Injectable 69527 Unit(s) IV Push every 7 days  everolimus (ZORTRESS) 2 milliGRAM(s) Oral <User Schedule>  everolimus (ZORTRESS) 2 milliGRAM(s) Oral <User Schedule>  heparin   Injectable 5000 Unit(s) SubCutaneous every 12 hours  insulin glargine Injectable (LANTUS) 15 Unit(s) SubCutaneous at bedtime  insulin lispro (ADMELOG) corrective regimen sliding scale   SubCutaneous at bedtime  insulin lispro (ADMELOG) corrective regimen sliding scale   SubCutaneous three times a day before meals  insulin lispro Injectable (ADMELOG) 8 Unit(s) SubCutaneous three times a day before meals  magnesium oxide 400 milliGRAM(s) Oral daily  meropenem  IVPB 500 milliGRAM(s) IV Intermittent <User Schedule>  nystatin    Suspension 913588 Unit(s) Oral four times a day  ofloxacin 0.3% Solution 10 Drop(s) Right Ear two times a day  pantoprazole    Tablet 40 milliGRAM(s) Oral before breakfast  predniSONE   Tablet 5 milliGRAM(s) Oral two times a day  sodium bicarbonate 1300 milliGRAM(s) Oral three times a day  tamsulosin 0.4 milliGRAM(s) Oral at bedtime  valGANciclovir 450 milliGRAM(s) Oral <User Schedule>  vancomycin    Solution 125 milliGRAM(s) Oral every 6 hours      PMHX/PSHX:  GIB (gastrointestinal bleeding)    GERD with esophagitis    Hepatic encephalopathy    Obesity    Fatty liver disease, nonalcoholic    Renal stones    Hypertension    Neuropathy    Hypercholesteremia    Diabetes    S/P cholecystectomy    No significant past surgical history        Family history:  Family history of type 2 diabetes mellitus    Family history of hypertension    Family history of stomach cancer    No pertinent family history in first degree relatives        ROS: As per HPI, 14-point ROS negative otherwise.    General:  No wt loss, fevers, chills, night sweats, fatigue,   Eyes:  Good vision, no reported pain  ENT:  No sore throat, pain, runny nose, dysphagia  CV:  No pain, palpitations, hypo/hypertension  Resp:  No dyspnea, cough, tachypnea, wheezing  GI:  See HPI  :  No pain, bleeding, incontinence, nocturia  Muscle:  No pain, weakness  Neuro:  No weakness, tingling, memory problems  Psych:  No fatigue, insomnia, mood problems, depression  Endocrine:  No polyuria, polydipsia, cold/heat intolerance  Heme:  No petechiae, ecchymosis, easy bruisability  Skin:  No rash, edema      PHYSICAL EXAM:     Vital Signs:  Vital Signs Last 24 Hrs  T(C): 37 (21 Dec 2020 07:00), Max: 38.3 (20 Dec 2020 23:00)  T(F): 98.6 (21 Dec 2020 07:00), Max: 100.9 (20 Dec 2020 23:00)  HR: 76 (21 Dec 2020 11:00) (76 - 95)  BP: 149/73 (21 Dec 2020 11:00) (134/70 - 181/83)  BP(mean): 105 (21 Dec 2020 11:00) (92 - 120)  RR: 22 (21 Dec 2020 11:00) (9 - 28)  SpO2: 96% (21 Dec 2020 11:00) (90% - 97%)  Daily     Daily Weight in k.5 (21 Dec 2020 03:02)    GENERAL:  appears comfortable, no acute distress  HEENT:  NC/AT,  conjunctivae clear, sclera -anicteric  CHEST:  no increased effort  HEART:  Regular rate and rhythm  ABDOMEN:  obese, soft, non-tender, non-distended   EXTREMITIES:  no cyanosis, clubbing or edema  SKIN:  No rash/erythema/ecchymoses/petechiae/wounds  NEURO:  Alert, orientedx3, No asterixis, does not know our name    LABS:                        8.1    4.58  )-----------( 185      ( 20 Dec 2020 21:44 )             25.3     12-20    137  |  100  |  71<H>  ----------------------------<  271<H>  4.6   |  21<L>  |  5.45<H>    Ca    8.3<L>      20 Dec 2020 21:44  Phos  4.3     12-20  Mg     2.2     12-    TPro  5.9<L>  /  Alb  2.9<L>  /  TBili  0.2  /  DBili  x   /  AST  32  /  ALT  37  /  AlkPhos  217<H>  12-    LIVER FUNCTIONS - ( 20 Dec 2020 21:44 )  Alb: 2.9 g/dL / Pro: 5.9 g/dL / ALK PHOS: 217 U/L / ALT: 37 U/L / AST: 32 U/L / GGT: x           PT/INR - ( 20 Dec 2020 21:44 )   PT: 12.6 sec;   INR: 1.05 ratio         PTT - ( 20 Dec 2020 21:44 )  PTT:29.8 sec  Urinalysis Basic - ( 20 Dec 2020 10:18 )    Color: Yellow / Appearance: Slightly Turbid / S.010 / pH: x  Gluc: x / Ketone: Negative  / Bili: Negative / Urobili: Negative   Blood: x / Protein: 100 / Nitrite: Negative   Leuk Esterase: Large / RBC: 9 /hpf /  /HPF   Sq Epi: x / Non Sq Epi: 0 /hpf / Bacteria: Few      Amylase Serum--      Lipase serum--       [id number]      Imaging:             Chief Complaint:  Patient is a 65y old  Male who presents with a chief complaint of transaminitis (21 Dec 2020 08:53)      Interval Events: Patient agitated with staff. States he is frustrated. Admits to not feeling completely like self with some confusion. No abdominal pain or diarrhea.    Allergies:  codeine (Anaphylaxis)      Hospital Medications:  aspirin  chewable 81 milliGRAM(s) Oral daily  chlorhexidine 2% Cloths 1 Application(s) Topical <User Schedule>  epoetin barry-epbx (RETACRIT) Injectable 50025 Unit(s) IV Push every 7 days  everolimus (ZORTRESS) 2 milliGRAM(s) Oral <User Schedule>  everolimus (ZORTRESS) 2 milliGRAM(s) Oral <User Schedule>  heparin   Injectable 5000 Unit(s) SubCutaneous every 12 hours  insulin glargine Injectable (LANTUS) 15 Unit(s) SubCutaneous at bedtime  insulin lispro (ADMELOG) corrective regimen sliding scale   SubCutaneous at bedtime  insulin lispro (ADMELOG) corrective regimen sliding scale   SubCutaneous three times a day before meals  insulin lispro Injectable (ADMELOG) 8 Unit(s) SubCutaneous three times a day before meals  magnesium oxide 400 milliGRAM(s) Oral daily  meropenem  IVPB 500 milliGRAM(s) IV Intermittent <User Schedule>  nystatin    Suspension 724993 Unit(s) Oral four times a day  ofloxacin 0.3% Solution 10 Drop(s) Right Ear two times a day  pantoprazole    Tablet 40 milliGRAM(s) Oral before breakfast  predniSONE   Tablet 5 milliGRAM(s) Oral two times a day  sodium bicarbonate 1300 milliGRAM(s) Oral three times a day  tamsulosin 0.4 milliGRAM(s) Oral at bedtime  valGANciclovir 450 milliGRAM(s) Oral <User Schedule>  vancomycin    Solution 125 milliGRAM(s) Oral every 6 hours      PMHX/PSHX:  GIB (gastrointestinal bleeding)    GERD with esophagitis    Hepatic encephalopathy    Obesity    Fatty liver disease, nonalcoholic    Renal stones    Hypertension    Neuropathy    Hypercholesteremia    Diabetes    S/P cholecystectomy    No significant past surgical history        Family history:  Family history of type 2 diabetes mellitus    Family history of hypertension    Family history of stomach cancer    No pertinent family history in first degree relatives        ROS: As per HPI, 14-point ROS negative otherwise.    General:  No wt loss, fevers, chills, night sweats, fatigue,   Eyes:  Good vision, no reported pain  ENT:  No sore throat, pain, runny nose, dysphagia  CV:  No pain, palpitations, hypo/hypertension  Resp:  No dyspnea, cough, tachypnea, wheezing  GI:  See HPI  :  No pain, bleeding, incontinence, nocturia  Muscle:  No pain, weakness  Neuro:  No weakness, tingling, memory problems  Psych:  No fatigue, insomnia, mood problems, depression  Endocrine:  No polyuria, polydipsia, cold/heat intolerance  Heme:  No petechiae, ecchymosis, easy bruisability  Skin:  No rash, edema      PHYSICAL EXAM:     Vital Signs:  Vital Signs Last 24 Hrs  T(C): 37 (21 Dec 2020 07:00), Max: 38.3 (20 Dec 2020 23:00)  T(F): 98.6 (21 Dec 2020 07:00), Max: 100.9 (20 Dec 2020 23:00)  HR: 76 (21 Dec 2020 11:00) (76 - 95)  BP: 149/73 (21 Dec 2020 11:00) (134/70 - 181/83)  BP(mean): 105 (21 Dec 2020 11:00) (92 - 120)  RR: 22 (21 Dec 2020 11:00) (9 - 28)  SpO2: 96% (21 Dec 2020 11:00) (90% - 97%)  Daily     Daily Weight in k.5 (21 Dec 2020 03:02)    GENERAL:  appears comfortable, no acute distress  HEENT:  NC/AT,  conjunctivae clear, sclera -anicteric  CHEST:  no increased effort  HEART:  Regular rate and rhythm  ABDOMEN:  obese, soft, non-tender, non-distended   EXTREMITIES:  no cyanosis, clubbing or edema  SKIN:  No rash/erythema/ecchymoses/petechiae/wounds  NEURO:  Alert, orientedx3, No asterixis, does not remember our names    LABS:                        8.1    4.58  )-----------( 185      ( 20 Dec 2020 21:44 )             25.3     12-20    137  |  100  |  71<H>  ----------------------------<  271<H>  4.6   |  21<L>  |  5.45<H>    Ca    8.3<L>      20 Dec 2020 21:44  Phos  4.3     12-  Mg     2.2     12-20    TPro  5.9<L>  /  Alb  2.9<L>  /  TBili  0.2  /  DBili  x   /  AST  32  /  ALT  37  /  AlkPhos  217<H>  12-20    LIVER FUNCTIONS - ( 20 Dec 2020 21:44 )  Alb: 2.9 g/dL / Pro: 5.9 g/dL / ALK PHOS: 217 U/L / ALT: 37 U/L / AST: 32 U/L / GGT: x           PT/INR - ( 20 Dec 2020 21:44 )   PT: 12.6 sec;   INR: 1.05 ratio         PTT - ( 20 Dec 2020 21:44 )  PTT:29.8 sec  Urinalysis Basic - ( 20 Dec 2020 10:18 )    Color: Yellow / Appearance: Slightly Turbid / S.010 / pH: x  Gluc: x / Ketone: Negative  / Bili: Negative / Urobili: Negative   Blood: x / Protein: 100 / Nitrite: Negative   Leuk Esterase: Large / RBC: 9 /hpf /  /HPF   Sq Epi: x / Non Sq Epi: 0 /hpf / Bacteria: Few      Amylase Serum--      Lipase serum--       [id number]      Imaging:

## 2020-12-21 NOTE — PROGRESS NOTE ADULT - ATTENDING COMMENTS
Mr. 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    # Fevers: Febrile again to Tmax 100.9 in past 24h despite rapid resolution of C diff symptoms with oral vancomycin and despite switch of IV antibiotics from cefepime to meropenem. Concern for AIN-related fevers, versus less likely incompletely treated infection (such as due to urinary source despite negative urine culture or due to OM). Discussed with Transplant ID Dr. Pérez, and if fevers continue tomorrow then would favor repeating imaging including CT CAP as well as MRI foot, and may also need repeat cystoscopy.    # MISA: S/p HD 12/19 and planned for HD again today. Urinalysis with WBCs and eosinophils, concerning for AIN, likely triggered by cefepime given timing but Transplant Neprology Dr. Patel will also review re: possibility that AIN could be related to any of his ongoing medications such as everolimus or Valcyte. If fevers persist and renal function is not improving, then may need renal biopsy for definitive diagnosis prior to consideration of course of high-dose steroids (especially as the latter poses risks given his immunosuppression, recent C diff colitis, hyperglycemia, and history of delirium).    # C diff colitis: Symptomatically improved. Continuing oral vancomycin (12/15- ).    # R calcaneous osteomyelitis: No cellulitis on exam. Switched from cefepime to meropenem due to concern for AIN, as above. May need repeat MRI foot, as above, if fevers persist.    # Post-DDLT: Liver enzymes improved since admission with prednisone increased to 5 mg po bid. MMF held. Continuing everolimus 2 mg po q12h and will adjust dose by trough levels. Remains on Valcyte for CMV prophylaxis and CMV PCR was negative (12/16). Bactrim held due to MISA.    # IDDM2: Hyperglycemic in past 24h, with insulin regimen adjusted as per SICU again today.    Please don't hesitate to call with any questions or concerns.    Letitia Mo M.D., Ph.D.  Transplant Hepatology  Cell: [phone]

## 2020-12-21 NOTE — PROGRESS NOTE ADULT - SUBJECTIVE AND OBJECTIVE BOX
Follow Up:  s/p OLT, C diff, Transaminitis, Abnormal U/A      Interval History:    REVIEW OF SYSTEMS  [  ] ROS unobtainable because:    [  ] All other systems negative except as noted below    Constitutional:  [ ] fever [ ] chills  [ ] weight loss  [ ] weakness  Skin:  [ ] rash [ ] phlebitis	  Eyes: [ ] icterus [ ] pain  [ ] discharge	  ENMT: [ ] sore throat  [ ] thrush [ ] ulcers [ ] exudates  Respiratory: [ ] dyspnea [ ] hemoptysis [ ] cough [ ] sputum	  Cardiovascular:  [ ] chest pain [ ] palpitations [ ] edema	  Gastrointestinal:  [ ] nausea [ ] vomiting [ ] diarrhea [ ] constipation [ ] pain	  Genitourinary:  [ ] dysuria [ ] frequency [ ] hematuria [ ] discharge [ ] flank pain  [ ] incontinence  Musculoskeletal:  [ ] myalgias [ ] arthralgias [ ] arthritis  [ ] back pain  Neurological:  [ ] headache [ ] seizures  [ ] confusion/altered mental status    Allergies  codeine (Anaphylaxis)        ANTIMICROBIALS:  meropenem  IVPB 500 <User Schedule>  nystatin    Suspension 008070 four times a day  valGANciclovir 450 <User Schedule>  vancomycin    Solution 125 every 6 hours      OTHER MEDS:  MEDICATIONS  (STANDING):  aspirin  chewable 81 daily  epoetin barry-epbx (RETACRIT) Injectable 51985 every 7 days  everolimus (ZORTRESS) 2 <User Schedule>  everolimus (ZORTRESS) 2 <User Schedule>  heparin   Injectable 5000 every 12 hours  insulin glargine Injectable (LANTUS) 15 at bedtime  insulin lispro (ADMELOG) corrective regimen sliding scale  three times a day before meals  insulin lispro (ADMELOG) corrective regimen sliding scale  at bedtime  insulin lispro Injectable (ADMELOG) 5 three times a day before meals  pantoprazole    Tablet 40 before breakfast  predniSONE   Tablet 5 two times a day  tamsulosin 0.4 at bedtime      Vital Signs Last 24 Hrs  T(C): 37.1 (21 Dec 2020 15:00), Max: 38.3 (20 Dec 2020 23:00)  T(F): 98.8 (21 Dec 2020 15:00), Max: 100.9 (20 Dec 2020 23:00)  HR: 77 (21 Dec 2020 15:00) (75 - 95)  BP: 163/85 (21 Dec 2020 15:00) (134/70 - 181/83)  BP(mean): 117 (21 Dec 2020 15:00) (92 - 124)  RR: 15 (21 Dec 2020 15:00) (9 - 31)  SpO2: 96% (21 Dec 2020 15:00) (90% - 97%)    PHYSICAL EXAMINATION:  General: Alert and Awake, NAD  HEENT: PERRL, EOMI  Neck: Supple  Cardiac: RRR, No M/R/G  Resp: CTAB, No Wh/Rh/Ra  Abdomen: NBS, NT/ND, No HSM, No rigidity or guarding  MSK: No LE edema. No Calf tenderness  : No obb  Skin: No rashes or lesions. Skin is warm and dry to the touch.   Neuro: Alert and Awake. CN 2-12 Grossly intact. Moves all four extremities spontaneously.  Psych: Calm, Pleasant, Cooperative                          8.1    4.58  )-----------( 185      ( 20 Dec 2020 21:44 )             25.3       12-20    137  |  100  |  71<H>  ----------------------------<  271<H>  4.6   |  21<L>  |  5.45<H>    Ca    8.3<L>      20 Dec 2020 21:44  Phos  4.3       Mg     2.2         TPro  5.9<L>  /  Alb  2.9<L>  /  TBili  0.2  /  DBili  x   /  AST  32  /  ALT  37  /  AlkPhos  217<H>  12-20      Urinalysis Basic - ( 20 Dec 2020 10:18 )    Color: Yellow / Appearance: Slightly Turbid / S.010 / pH: x  Gluc: x / Ketone: Negative  / Bili: Negative / Urobili: Negative   Blood: x / Protein: 100 / Nitrite: Negative   Leuk Esterase: Large / RBC: 9 /hpf /  /HPF   Sq Epi: x / Non Sq Epi: 0 /hpf / Bacteria: Few        MICROBIOLOGY:  v  .Blood Blood-Peripheral  20   No growth to date.  --  --      .Urine Catheterized  20   No growth  --  --      .Catheter PICC line tip  12-15-20   < 15 colonies Coag Negative Staphylococcus  --  --      .Urine Clean Catch (Midstream)  12-15-20   <10,000 CFU/mL Normal Urogenital Shantel  --  --      .Blood PICC/PERC Single Lumen  20   No Growth Final  --  --      .Tissue Other  20   Growth in fluid media only Staphylococcus epidermidis  "Susceptibilities not performed"  --  Staphylococcus epidermidis          Rapid RVP Result: NotDetec ( @ 10:04)  Rapid RVP Result: NotDetec ( @ 18:36)  C Diff by PCR Result: Detected (12-15 @ 14:53)    C Diff by PCR Result: Detected (12-15-20 @ 14:53)  Clostridium difficile GDH Toxins A&amp;B, EIA:   Indeterminate (12-15-20 @ 11:23)  Clostridium difficile GDH Interpretation: This specimen is positive for C. Difficile glutamate dehydrogenase (GDH)  antigen and negative for C. Difficile Toxins A & B, by EIA.  GDH is a  highly sensitive screening marker for C. Difficile that is produced in  large amounts by all C. Difficilestrains, both toxigenic and  nontoxigenic.  Specimens that are GDH positive and toxin negative will be  tested further by PCR to detect toxin gene sequences associated with  toxin producing C. Difficle. (12-15-20 @ 11:23)      RADIOLOGY:    <The imaging below has been reviewed and visualized by me independently. Findings as detailed in report below> Follow Up:  s/p OLT, C diff, Transaminitis, Abnormal U/A    Interval History: low grade fever overnight. no diarrhea. denies cough or SOB. No N/V. No urinary symptoms. Does note difficulty tasting his food    REVIEW OF SYSTEMS  [  ] ROS unobtainable because:    [x  ] All other systems negative except as noted below    Constitutional:  [ ] fever [ ] chills  [ ] weight loss  [ ] weakness  Skin:  [ ] rash [ ] phlebitis	  Eyes: [ ] icterus [ ] pain  [ ] discharge	  ENMT: [ ] sore throat  [ ] thrush [ ] ulcers [ ] exudates +dysguesia  Respiratory: [ ] dyspnea [ ] hemoptysis [ ] cough [ ] sputum	  Cardiovascular:  [ ] chest pain [ ] palpitations [ ] edema	  Gastrointestinal:  [ ] nausea [ ] vomiting [ ] diarrhea [ ] constipation [ ] pain	  Genitourinary:  [ ] dysuria [ ] frequency [ ] hematuria [ ] discharge [ ] flank pain  [ ] incontinence  Musculoskeletal:  [ ] myalgias [ ] arthralgias [ ] arthritis  [ ] back pain  Neurological:  [ ] headache [ ] seizures  [ ] confusion/altered mental status    Allergies  codeine (Anaphylaxis)        ANTIMICROBIALS:  meropenem  IVPB 500 <User Schedule>  nystatin    Suspension 073720 four times a day  valGANciclovir 450 <User Schedule>  vancomycin    Solution 125 every 6 hours      OTHER MEDS:  MEDICATIONS  (STANDING):  aspirin  chewable 81 daily  epoetin barry-epbx (RETACRIT) Injectable 01475 every 7 days  everolimus (ZORTRESS) 2 <User Schedule>  everolimus (ZORTRESS) 2 <User Schedule>  heparin   Injectable 5000 every 12 hours  insulin glargine Injectable (LANTUS) 15 at bedtime  insulin lispro (ADMELOG) corrective regimen sliding scale  three times a day before meals  insulin lispro (ADMELOG) corrective regimen sliding scale  at bedtime  insulin lispro Injectable (ADMELOG) 5 three times a day before meals  pantoprazole    Tablet 40 before breakfast  predniSONE   Tablet 5 two times a day  tamsulosin 0.4 at bedtime      Vital Signs Last 24 Hrs  T(C): 37.1 (21 Dec 2020 15:00), Max: 38.3 (20 Dec 2020 23:00)  T(F): 98.8 (21 Dec 2020 15:00), Max: 100.9 (20 Dec 2020 23:00)  HR: 77 (21 Dec 2020 15:00) (75 - 95)  BP: 163/85 (21 Dec 2020 15:00) (134/70 - 181/83)  BP(mean): 117 (21 Dec 2020 15:00) (92 - 124)  RR: 15 (21 Dec 2020 15:00) (9 - 31)  SpO2: 96% (21 Dec 2020 15:00) (90% - 97%)    PHYSICAL EXAMINATION:  General: Alert and Awake, NAD  HEENT: PERRL, EOMI  Neck: Supple  Cardiac: RRR, No M/R/G  Resp: CTAB, No Wh/Rh/Ra  Abdomen: NBS, NT/ND, No HSM, No rigidity or guarding  MSK: +RLE heel with fibrotic wound with no drainage, erythema or tenderness to palpation. No LE edema. No Calf tenderness  Skin: No rashes or lesions. Skin is warm and dry to the touch.   Neuro: Alert and Awake. CN 2-12 Grossly intact. Moves all four extremities spontaneously.  Psych: Calm, Pleasant, Cooperative  Vasc: R Sided HD Catheter (No surrounding erythema, drainage or tenderness to palpation)                          8.1    4.58  )-----------( 185      ( 20 Dec 2020 21:44 )             25.3       12-20    137  |  100  |  71<H>  ----------------------------<  271<H>  4.6   |  21<L>  |  5.45<H>    Ca    8.3<L>      20 Dec 2020 21:44  Phos  4.3     12-20  Mg     2.2     12-20    TPro  5.9<L>  /  Alb  2.9<L>  /  TBili  0.2  /  DBili  x   /  AST  32  /  ALT  37  /  AlkPhos  217<H>  12-20      Urinalysis Basic - ( 20 Dec 2020 10:18 )    Color: Yellow / Appearance: Slightly Turbid / S.010 / pH: x  Gluc: x / Ketone: Negative  / Bili: Negative / Urobili: Negative   Blood: x / Protein: 100 / Nitrite: Negative   Leuk Esterase: Large / RBC: 9 /hpf /  /HPF   Sq Epi: x / Non Sq Epi: 0 /hpf / Bacteria: Few    MICROBIOLOGY:    .Blood Blood-Peripheral  20   No growth to date.  --  --    .Urine Catheterized  20   No growth  --  --      .Catheter PICC line tip  12-15-20   < 15 colonies Coag Negative Staphylococcus  --  --      .Urine Clean Catch (Midstream)  12-15-20   <10,000 CFU/mL Normal Urogenital Shantel  --  --      .Blood PICC/PERC Single Lumen  20   No Growth Final  --  --      .Tissue Other  20   Growth in fluid media only Staphylococcus epidermidis  "Susceptibilities not performed"  --  Staphylococcus epidermidis    Rapid RVP Result: NotDetec ( @ 10:04)  Rapid RVP Result: NotDetec ( @ 18:36)  C Diff by PCR Result: Detected (12-15 @ 14:53)    C Diff by PCR Result: Detected (12-15-20 @ 14:53)  Clostridium difficile GDH Toxins A&amp;B, EIA:   Indeterminate (12-15-20 @ 11:23)  Clostridium difficile GDH Interpretation: This specimen is positive for C. Difficile glutamate dehydrogenase (GDH)  antigen and negative for C. Difficile Toxins A & B, by EIA.  GDH is a  highly sensitive screening marker for C. Difficile that is produced in  large amounts by all C. Difficilestrains, both toxigenic and  nontoxigenic.  Specimens that are GDH positive and toxin negative will be  tested further by PCR to detect toxin gene sequences associated with  toxin producing C. Difficle. (12-15-20 @ 11:23)      RADIOLOGY:    <The imaging below has been reviewed and visualized by me independently. Findings as detailed in report below>    EXAM:  XR CHEST PORTABLE URGENT 1V                        PROCEDURE DATE:  2020    The heart is enlarged. The lungs are clear. The right central line was placed and the tip is in superior vena cava. No pneumothorax. No pleural effusion. No acute bony pathology could be identified.

## 2020-12-21 NOTE — PROGRESS NOTE ADULT - ATTENDING COMMENTS
HD today.  Still with low grade fevers.  Will d/w ID.   Immuno: everolimus, pred.  likely can lower pred to 5mg qd

## 2020-12-21 NOTE — PROVIDER CONTACT NOTE (OTHER) - RECOMMENDATIONS
Chief Complaint   Patient presents with   • Follow-Up     Narcolepsy       HPI: This patient is a 47 y.o. Male who presents for follow-up of narcolepsy with cataplexy. He uses Adderall XR 30 mg 2-3 tablets twice a day to 3 times a day. He is able to stay alert at work without difficulty during normal work shifts. He has been experiencing increased sleep paralysis and EDS over the past 6 months however has been working long hours and had reduced bedtime. Denies worsening cataplexy. He periodically uses Ambien CR. He denies palpitations or chest discomfort.  He has a history of cardiomyopathy and PE, status post pacemaker and defibrillator and follows with Dr. Maharaj, with significant improvement in his cardiac function per echocardiography from 15-50%. He has been removed from the cardiac transplant list.    Past Medical History:   Diagnosis Date   • Male erectile disorder 8/9/2011   • Mixed hyperlipidemia 8/9/2011   • Abnormal electrocardiogram 7/27/2011   • Chronic systolic congestive heart failure (CMS-HCC) 7/27/2011   • Dizziness 3/31/2011   • STEMI (ST elevation myocardial infarction) pk  trop 150 POBA LAD and DIAG med rx. 12/18/2010   • Bronchitis    • Congestive heart failure (CMS-[facility])    • Fall    • Heart valve disease    • Hyperlipidemia    • Hypertension    • Narcolepsy with cataplexy    • Pulmonary embolism (CMS-[facility])        Social History     Social History   • Marital status:      Spouse name: N/A   • Number of children: N/A   • Years of education: N/A     Occupational History   • Not on file.     Social History Main Topics   • Smoking status: Former Smoker     Packs/day: 0.50     Years: 15.00     Types: Cigarettes     Quit date: 12/2/2010   • Smokeless tobacco: Never Used      Comment: 1/2 pack/day   • Alcohol use 0.0 oz/week      Comment: occasional   • Drug use: No   • Sexual activity: Not on file     Other Topics Concern   • Not on file     Social History Narrative   • No narrative on  "file       History reviewed. No pertinent family history.    Current Outpatient Prescriptions on File Prior to Visit   Medication Sig Dispense Refill   • warfarin (COUMADIN) 7.5 MG Tab TAKE 1 & 1/2 TABLETS BY MOUTH ONCE DAILY 45 Tab 6   • amphetamine-dextroamphetamine ER (ADDERALL XR) 30 MG XR capsule 2-3 tabs by mouth up to 2-3 times per day as needed for narcolepsy symptoms. 180 Cap 0   • zolpidem (AMBIEN CR) 12.5 MG CR tablet Take 1 Tab by mouth at bedtime as needed for Sleep (insomnia). 30 Tab 3   • atorvastatin (LIPITOR) 40 MG Tab Take 40 mg by mouth every evening.     • carvedilol (COREG) 25 MG Tab Take 25 mg by mouth 2 times a day, with meals.     • amlodipine (NORVASC) 10 MG TABS TAKE 1 TABLET BY MOUTH EVERY DAY 30 Tab 6   • enalapril (VASOTEC) 20 MG tablet TAKE 1 TABLET BY MOUTH TWICE A DAY 60 Tab 3   • aspirin EC (ECOTRIN) 81 MG TBEC Take 1 Tab by mouth every day. 30 Each 11     No current facility-administered medications on file prior to visit.        Allergies: Review of patient's allergies indicates no known allergies.    ROS:   Constitutional: Denies fevers, chills, night sweats, fatigue or weight loss  Eyes: Denies vision loss, pain, drainage, double vision  Ears, Nose, Throat: Denies earache, difficulty hearing, tinnitus, nasal congestion, hoarseness  Cardiovascular: Denies chest pain, tightness, palpitations, orthopnea or edema  Respiratory: Denies shortness of breath, cough, wheezing, hemoptysis  Sleep:As in history of present illness  GI: Denies heartburn, dysphagia, nausea, abdominal pain, diarrhea or constipation  : Denies frequent urination, hematuria, discharge or painful urination  Musculoskeletal: Denies back pain, painful joints, sore muscles  Neurological: Denies weakness or headaches  Skin: No rashes    Blood pressure 142/102, pulse 85, resp. rate 16, height 1.676 m (5' 6\"), weight 70.8 kg (156 lb), SpO2 95 %.    Physical Exam:  Appearance: Well-nourished, well-developed, in no acute " increase standing admelog dose from 5 units to 8units, increase bedtime lantus? insulin gtt? distress  HEENT: Normocephalic, atraumatic, white sclera, PERRLA, oropharynx clear  Neck: No adenopathy or masses  Respiratory: no intercostal retractions or accessory muscle use  Lungs auscultation: Clear to auscultation bilaterally  Cardiovascular: Regular rate rhythm. No murmurs, rubs or gallops.  No LE edema  Abdomen: soft, nondistended  Gait: Normal  Digits: No clubbing, cyanosis  Motor: No focal deficits  Orientation: Oriented to time, person and place    Diagnosis:  1. Narcolepsy with cataplexy     2. Narcolepsy with cataplexy  amphetamine-dextroamphetamine ER (ADDERALL XR) 30 MG XR capsule       Plan:  Johny's narcolepsy overall has shown good control on Adderall. He has stable cataplexy. We had discussed Xyrem however he declines at this time and would like to continue with Adderall.  He was encouraged to increase his sleep time to 8 hours nightly, take power naps when able, and maintain good sleep hygiene.  Return in about 3 months (around 12/29/2017).

## 2020-12-21 NOTE — PROGRESS NOTE ADULT - SUBJECTIVE AND OBJECTIVE BOX
Transplant Surgery - Multidisciplinary Rounds  --------------------------------------------------------------  s/p OLT 7/2/2020      Admitted 12/14/2020 with transaminitis, MISA, diarrhea    Present:  Patient seen and examined during AM rounds with multidisciplinary team including Transplant Surgeon: Dr. Chris/[facility], Transplant Hepatologist:  Dr. Mo, Transplant Nephrologist: Dr. FAY Patel, Inpatient Providers:  Xavi/Artem/Rufino, unit RN. Disciplines not in attendance will be notified of the plan.      HPI: 65M with PMHx of IDDM, HLD, obesity, HFpEF with mild LV diastolic dysfunction, MGUS, chronic anemia with a history of duodenal ulcer 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  Underwent OLT on 7/2/2020, post op course c/b VRE bacteremia tx with linezolid and delirium likely in setting of CNI toxicity (FK discontinued/Everolimus initiated 7/27).    Recent admission from 11/20-12/2 for OM of the R heel, s/p debridement with Podiatry, and was discharged with Cefepime x 6 weeks via PICC line (placed 12/1). Initial wound cx on admission grew Pseudomonas, OR cx grew staph epi. Was also found with neutropenia and Valcyte was reduced (was on 900mg daily CMV +/-).      Sent to ED from clinic with rising transaminitis and MISA (SCr 3.3 from 1.5). Hospital course c/b C diff colitis and worsening renal function now requiring SICU care for uremic encephalopathy and fevers.    Interval Events:  - continues to have intermittent fevers, TMax 100.9F overnight. on Lachelle/Vanco PO/Ofloxacin. rest of VS stable.  - diarrhea improving  - now AAOx3  - continues with non-oliguric renal function, Cr now 5.45. from 5.62, s/p HD 12/19 with no fluid removal  - graft: transaminitis improving    Potential Discharge date: pending clinical improvement     Education:  Medications    Plan of care:  See Below      MEDICATIONS  (STANDING):  aspirin  chewable 81 milliGRAM(s) Oral daily  chlorhexidine 2% Cloths 1 Application(s) Topical <User Schedule>  epoetin barry-epbx (RETACRIT) Injectable 23411 Unit(s) IV Push every 7 days  everolimus (ZORTRESS) 2 milliGRAM(s) Oral <User Schedule>  everolimus (ZORTRESS) 2 milliGRAM(s) Oral <User Schedule>  heparin   Injectable 5000 Unit(s) SubCutaneous every 12 hours  insulin glargine Injectable (LANTUS) 15 Unit(s) SubCutaneous at bedtime  insulin lispro (ADMELOG) corrective regimen sliding scale   SubCutaneous three times a day before meals  insulin lispro (ADMELOG) corrective regimen sliding scale   SubCutaneous at bedtime  insulin lispro Injectable (ADMELOG) 8 Unit(s) SubCutaneous three times a day before meals  magnesium oxide 400 milliGRAM(s) Oral daily  meropenem  IVPB 500 milliGRAM(s) IV Intermittent <User Schedule>  nystatin    Suspension 114803 Unit(s) Oral four times a day  ofloxacin 0.3% Solution 10 Drop(s) Right Ear two times a day  pantoprazole    Tablet 40 milliGRAM(s) Oral before breakfast  predniSONE   Tablet 5 milliGRAM(s) Oral two times a day  sodium bicarbonate 1300 milliGRAM(s) Oral three times a day  tamsulosin 0.4 milliGRAM(s) Oral at bedtime  valGANciclovir 450 milliGRAM(s) Oral <User Schedule>  vancomycin    Solution 125 milliGRAM(s) Oral every 6 hours    MEDICATIONS  (PRN):      PAST MEDICAL & SURGICAL HISTORY:  GIB (gastrointestinal bleeding)    GERD with esophagitis  Gastritis &amp; Non Bleeding Ulcers    Hepatic encephalopathy    Obesity    Fatty liver disease, nonalcoholic    Renal stones  25 years ago    Hypertension    Neuropathy    Hypercholesteremia    Diabetes    S/P cholecystectomy        Vital Signs Last 24 Hrs  T(C): 37 (21 Dec 2020 07:00), Max: 38.3 (20 Dec 2020 23:00)  T(F): 98.6 (21 Dec 2020 07:00), Max: 100.9 (20 Dec 2020 23:00)  HR: 83 (21 Dec 2020 08:00) (77 - 95)  BP: 181/83 (21 Dec 2020 08:00) (134/70 - 181/83)  BP(mean): 119 (21 Dec 2020 08:00) (92 - 120)  RR: 16 (21 Dec 2020 08:00) (9 - 28)  SpO2: 89% (21 Dec 2020 08:00) (89% - 98%)    I&O's Summary    20 Dec 2020 07:01  -  21 Dec 2020 07:00  --------------------------------------------------------  IN: 1700 mL / OUT: 1200 mL / NET: 500 mL                              8.1    4.58  )-----------( 185      ( 20 Dec 2020 21:44 )             25.3     12-20    137  |  100  |  71<H>  ----------------------------<  271<H>  4.6   |  21<L>  |  5.45<H>    Ca    8.3<L>      20 Dec 2020 21:44  Phos  4.3     12-20  Mg     2.2     12-20    TPro  5.9<L>  /  Alb  2.9<L>  /  TBili  0.2  /  DBili  x   /  AST  32  /  ALT  37  /  AlkPhos  217<H>  12-20          Culture - Urine (collected 12-17-20 @ 20:08)  Source: .Urine Catheterized  Final Report (12-18-20 @ 15:59):    No growth    Culture - Catheter (collected 12-15-20 @ 16:43)  Source: .Catheter PICC line tip  Final Report (12-17-20 @ 18:03):    < 15 colonies Coag Negative Staphylococcus    Culture - Urine (collected 12-15-20 @ 16:28)  Source: .Urine Clean Catch (Midstream)  Final Report (12-16-20 @ 12:12):    <10,000 CFU/mL Normal Urogenital Shantel    Culture - Blood (collected 12-14-20 @ 23:13)  Source: .Blood Blood-Peripheral  Final Report (12-20-20 @ 01:01):    No Growth Final    Culture - Blood (collected 12-14-20 @ 23:13)  Source: .Blood Blood-Peripheral  Final Report (12-20-20 @ 01:01):    No Growth Final    Culture - Blood (collected 12-14-20 @ 23:13)  Source: .Blood PICC/PERC Single Lumen  Final Report (12-20-20 @ 01:01):    No Growth Final              Review of systems  Gen: febrile  Skin:  R heel wound   Head/Eyes/Ears/Mouth: No headache; Normal hearing; Normal vision w/o blurriness; No sinus pain/discomfort, sore throat  Respiratory: No dyspnea, cough, wheezing, hemoptysis  CV: No chest pain, PND, orthopnea  GI:  denies abdominal pain, diarrhea, constipation, nausea, vomiting, melena, hematochezia  : No increased frequency, dysuria, hematuria, nocturia  MSK: No joint pain/swelling; no back pain; no edema  Neuro: No dizziness/lightheadedness, weakness, seizures, numbness, tingling  Heme: No easy bruising or bleeding  Endo: No heat/cold intolerance  Psych: No significant nervousness, anxiety, stress, depression  All other systems were reviewed and are negative, except as noted.      PHYSICAL EXAM:  Constitutional: Well developed / well nourished  Eyes: Anicteric, PERRLA  ENMT: nc/at  Respiratory: CTA B/L  Cardiovascular: RRR, + heart murmur  Gastrointestinal: Soft, non distended, non tender, incision well healed  Genitourinary: Voiding spontaneously  Extremities: SCD's in place and working bilaterally. no edema  Vascular: Palpable dp pulses bilaterally  Neurological: A&O x3  Skin: R heel ulcer: no drainage, no cellulitis   Musculoskeletal: Moving all extremities  Psychiatric: Responsive

## 2020-12-21 NOTE — PROGRESS NOTE ADULT - ASSESSMENT
65M with PMHx of IDDM, HLD, obesity, HFpEF with mild LV diastolic dysfunction, MGUS, chronic anemia with a history of duodenal ulcer 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  Underwent OLT on 7/2/2020, post op coure c/b VRE bacteremia tx with linezolid and delirium likely in setting of CNI toxicity (FK discontinued/Everolimus initiated 7/27).    Recent admission from 11/20-12/2 for OM of the R heel, s/p debridement with Podiatry, and was discharged with Cefepime x 6 weeks via PICC line (placed 12/1).     Initial wound cx on admission grew Pseudomonas, OR cx grew staph epi.  He now presents with from clinic rising transaminitis and MISA (SCr 3.3 from 1.5)    Hospital course c/b C. Diff colitis and worsening renal function, requiring SICU care    [] ID:   - C. Diff colitis: continue Vanco PO; diarrhea improving. will hold off on Flagyl   - R heel OM: s/p debridement by podiatry last admission; wound healing, no signs on infection.   - fevers persist.  cultures negative so far.  continue Lachelle given h/o ESBL E.Coli. Fungitell, repeat BCx pending  - CMV +/- :  Valcyte 450mg BIW (renally adjusted), CMV PCR neg from 12/15. will send repeat weekly, next due 12/22  - R ear otitis externa with chronic hearing loss: Ofloxacin per ENT  - TTE w/o obvious evidence of endocarditis     [] non-oliguric MISA   - Worsening renal function with associated uremic encephelopathy, improving today  - Shiley placed 12/19, s/p HD 12/19.  HD today with no fluid removal today   - Anemia:  Procrit 10K sq weekly    [] s/p OLT with transaminitis  - Everolimus 2/2, MMF on hold since last admission in setting of OM; Pred 5/5  - PPx: Valcyte renally dosed, off Bactrim  - Liver doppler with patent vessels   - LFTs trending down    [] DM  - diabetic diet  - tight glycemic control via Lantus/Lispro    [] Dispo:   - appreciate SICU care

## 2020-12-21 NOTE — PROVIDER CONTACT NOTE (OTHER) - BACKGROUND
transamintis and MISA s/p nava and emergent HD pt p/w transamintis and MISA s/p shiley and emergent HD

## 2020-12-21 NOTE — PROGRESS NOTE ADULT - ASSESSMENT
64 year old man type 2 diabetes, HLD, GERD, HFpEF with mild LV diastolic dysfunction, and decompensated JEAN cirrhosis, duodenal ulcer, GAVE and duodenal AVM s/p APC (last on 10/11/19) and SHENG who is now s/p liver transplant (7/2/20) with post-op course c/b tacrolimus toxicity and hospital delirium improving after stopping CNI and replacing w/ everolimus. Patient recently presented 11/20/20 with worsening LE swelling and right foot wound with wound culture 11/24 growing S. epidermidis and calcaneus bone biopsy culture 11/24 demonstrating osteomyelitis sent home with cefepime. Patient now with sepsis and MISA likely secondary to cdiff. Patient having low grade fever.      Impression:   # Fever: Unclear etiology. Has cdiff and c/f AIN which can both cause fevers. Possible osteo? however podiatry eval appears to show low concern  # Cdiff: on po vanco now. WBC wnl. Diarrhea resolving with more formed stools and less frequent. He is however still febrile.  # MISA with c/f uremia: Remains nonoliguric MISA however given brief encephalopathy now on intermittent dialysis. Kidney slightly enlarged on u/s- may suggest AIN. nonoliguric. FeNa and FeUrea suggest possible AIN (from cefepime) vs acute tubular necrosis likely from cdiff and volume loss.  # Right foot osteomyelitis:  reviewed by podiatry, appears stable. s/p surgical debridement and graft placement on 11/24 by Podiatry. Wound culture grew pseudomonas and bone bx on 11/24 growing S. epidermidis - likely contaminant. Had PICC line, now removed. On meropenem  # Otitis externa: on floxin drops  # S/p liver transplant 7/2/20: Transaminases and INR mildly elevated likely in setting of infection.   Recipient Info:   ABO: A  CMV:  Neg  EBV Positive  Donor:  Donor ID:  [id number] Match: [id number]  Age: 29 ABO:  A1 High Risk:   No COD: Cardiovascular  Anti CMV positive, EBV IgG- positive  HepBcAb-neg, Hepatitis C-DANA- neg, Hepatitis C ab-neg  # Fever - unclear etiology, infectious workup ongoing.  # Bladder wall thickening  # Type 2 diabetes  # SHENG     Recommendations:  - dialysis per renal team  - await fungitell  - await repeat blood cultures  - taper prednisone dose per transplant surgery and nephrology tmro  - continue with po vanco, meropenum, and floxin drops per ENT  - await transplant ID reccs  - strict glycemic control  - bob for strict I/Os  - immunosuppressives of everolimus and prednisone per transplant surgery  - appreciate transplant ID, renal, surgery reccs  - hold bactrim given MISA  - monitor CBC, CMP, INR  - transplant hepatology to follow   65 year old man type 2 diabetes, HLD, GERD, HFpEF with mild LV diastolic dysfunction, and decompensated JEAN cirrhosis, duodenal ulcer, GAVE and duodenal AVM s/p APC (last on 10/11/19) and SHENG who is now s/p liver transplant (7/2/20) with post-op course c/b tacrolimus toxicity and hospital delirium improving after stopping CNI and replacing w/ everolimus. Patient recently presented 11/20/20 with worsening LE swelling and right foot wound with wound culture 11/24 growing S. epidermidis and calcaneus bone biopsy culture 11/24 demonstrating osteomyelitis sent home with cefepime. Patient now with sepsis and MISA likely secondary to cdiff. Patient having low grade fever.      Impression:   # Fever: Unclear etiology. Has cdiff and c/f AIN which can both cause fevers. Possible osteo? however podiatry eval appears to show low concern  # Cdiff: on po vanco now. WBC wnl. Diarrhea resolving with more formed stools and less frequent. He is however still febrile.  # MISA with c/f uremia: Remains nonoliguric MISA however given brief encephalopathy now on intermittent dialysis. Kidney slightly enlarged on u/s- may suggest AIN. nonoliguric. FeNa and FeUrea suggest possible AIN (from cefepime) vs acute tubular necrosis likely from cdiff and volume loss.  # Right foot osteomyelitis:  reviewed by podiatry, appears stable. s/p surgical debridement and graft placement on 11/24 by Podiatry. Wound culture grew pseudomonas and bone bx on 11/24 growing S. epidermidis - likely contaminant. Had PICC line, now removed. On meropenem  # Otitis externa: on floxin drops  # S/p liver transplant 7/2/20: Transaminases and INR mildly elevated likely in setting of infection.   Recipient Info:   ABO: A  CMV:  Neg  EBV Positive  Donor:  Donor ID:  [id number] Match: [id number]  Age: 29 ABO:  A1 High Risk:   No COD: Cardiovascular  Anti CMV positive, EBV IgG- positive  HepBcAb-neg, Hepatitis C-DANA- neg, Hepatitis C ab-neg  # Fever - unclear etiology, infectious workup ongoing.  # Bladder wall thickening  # Type 2 diabetes  # SHENG     Recommendations:  - will discuss with Transplant Surgery and Nephrology re: possible renal biopsy and/or empiric steroid course for AIN if fevers and renal function not improving  - if continuing to have fevers, will also repeat CT CAP and MRI foot as per Transplant ID  - dialysis per renal team  - await fungitell  - await repeat blood cultures  - continue with po vanco, meropenum, and floxin drops per ENT  - strict glycemic control  - bob for strict I/Os  - immunosuppressives of everolimus and prednisone per transplant surgery  - appreciate transplant ID, renal, surgery reccs  - hold bactrim given MISA  - monitor CBC, CMP, INR  - transplant hepatology to follow

## 2020-12-21 NOTE — PROGRESS NOTE ADULT - SUBJECTIVE AND OBJECTIVE BOX
[facility]  Ramón Morales PA-C  [address] [place] [address]  [phone]      INTERVAL HPI/OVERNIGHT EVENTS:    patient s/e  events noted  seen in icu        MEDICATIONS  (STANDING):  aspirin  chewable 81 milliGRAM(s) Oral daily  chlorhexidine 2% Cloths 1 Application(s) Topical daily  epoetin barry-epbx (RETACRIT) Injectable 56119 Unit(s) SubCutaneous every 7 days  everolimus (ZORTRESS) 1.5 milliGRAM(s) Oral <User Schedule>  heparin   Injectable 5000 Unit(s) SubCutaneous every 12 hours  insulin glargine Injectable (LANTUS) 10 Unit(s) SubCutaneous at bedtime  insulin lispro (ADMELOG) corrective regimen sliding scale   SubCutaneous three times a day before meals  insulin lispro (ADMELOG) corrective regimen sliding scale   SubCutaneous at bedtime  insulin lispro Injectable (ADMELOG) 5 Unit(s) SubCutaneous three times a day before meals  insulin lispro Injectable (ADMELOG) 2 Unit(s) SubCutaneous once  magnesium oxide 400 milliGRAM(s) Oral daily  meropenem  IVPB 500 milliGRAM(s) IV Intermittent every 24 hours  nystatin    Suspension 097135 Unit(s) Oral four times a day  ofloxacin 0.3% Solution 10 Drop(s) Right Ear two times a day  pantoprazole    Tablet 40 milliGRAM(s) Oral before breakfast  predniSONE   Tablet 5 milliGRAM(s) Oral two times a day  sodium bicarbonate 1300 milliGRAM(s) Oral three times a day  sodium bicarbonate  Infusion 0.05 mEq/kG/Hr (75 mL/Hr) IV Continuous <Continuous>  tamsulosin 0.4 milliGRAM(s) Oral at bedtime  valGANciclovir 450 milliGRAM(s) Oral <User Schedule>  vancomycin    Solution 125 milliGRAM(s) Oral every 6 hours    MEDICATIONS  (PRN):      Allergies    codeine (Anaphylaxis)    Intolerances        ROS:   General:  No wt loss, fevers, chills, night sweats, + fatigue,   Eyes:  Good vision, no reported pain  ENT:  No sore throat, pain, runny nose, dysphagia  CV:  No pain, palpitations, hypo/hypertension  Resp:  No dyspnea, cough, tachypnea, wheezing  GI:  No pain, No nausea, No vomiting, No diarrhea, No constipation, No weight loss, No fever, No pruritis, No rectal bleeding, No tarry stools, No dysphagia,  :  No pain, bleeding, incontinence, nocturia  Muscle:  No pain, weakness  Neuro:  No weakness, tingling, memory problems  Psych:  No fatigue, insomnia, mood problems, depression  Endocrine:  No polyuria, polydipsia, cold/heat intolerance  Heme:  No petechiae, ecchymosis, easy bruisability  Skin:  No rash, tattoos, scars, edema      PHYSICAL EXAM:   Vital Signs:  Vital Signs Last 24 Hrs  T(C): 36.5 (19 Dec 2020 15:00), Max: 37.2 (19 Dec 2020 04:00)  T(F): 97.7 (19 Dec 2020 15:00), Max: 99 (19 Dec 2020 04:00)  HR: 75 (19 Dec 2020 15:00) (75 - 91)  BP: 148/68 (19 Dec 2020 15:00) (118/81 - 163/70)  BP(mean): 95 (19 Dec 2020 15:00) (90 - 106)  RR: 20 (19 Dec 2020 15:) (16 - 20)  SpO2: 96% (19 Dec 2020 15:00) (92% - 98%)  Daily     Daily Weight in k.8 (18 Dec 2020 20:35)    GENERAL:  Appears stated age, well-groomed, well-nourished, no distress  HEENT:  NC/AT,  conjunctivae clear and pink, no thyromegaly, nodules, adenopathy, no JVD, sclera -anicteric  CHEST:  Full & symmetric excursion, no increased effort, breath sounds clear  HEART:  Regular rhythm, S1, S2, no murmur/rub/S3/S4, no abdominal bruit, no edema  ABDOMEN:  Soft, non-tender, non-distended, normoactive bowel sounds,  no masses ,no hepato-splenomegaly, no signs of chronic liver disease  EXTEREMITIES:  no cyanosis,clubbing or edema  SKIN:  No rash/erythema/ecchymoses/petechiae/wounds/abscess/warm/dry  NEURO:  Alert, oriented, no asterixis, no tremor, no encephalopathy      LABS:                        7.4    4.50  )-----------( 179      ( 19 Dec 2020 05:29 )             22.9     12-    133<L>  |  101  |  92<H>  ----------------------------<  224<H>  4.7   |  16<L>  |  6.94<H>    Ca    8.2<L>      19 Dec 2020 05:29  Phos  4.6       Mg     2.2         TPro  5.8<L>  /  Alb  2.6<L>  /  TBili  0.1<L>  /  DBili  x   /  AST  31  /  ALT  47<H>  /  AlkPhos  222<H>      PT/INR - ( 19 Dec 2020 05:29 )   PT: 13.1 sec;   INR: 1.10 ratio         PTT - ( 19 Dec 2020 05:29 )  PTT:26.1 sec  Urinalysis Basic - ( 17 Dec 2020 16:54 )    Color: Light Yellow / Appearance: Slightly Turbid / S.012 / pH: x  Gluc: x / Ketone: Negative  / Bili: Negative / Urobili: Negative   Blood: x / Protein: 100 mg/dL / Nitrite: Negative   Leuk Esterase: Large / RBC: 3 /hpf /  /HPF   Sq Epi: x / Non Sq Epi: 0 /hpf / Bacteria: Negative        RADIOLOGY & ADDITIONAL TESTS:

## 2020-12-21 NOTE — PROGRESS NOTE ADULT - ASSESSMENT
65 yr old man with liver transplant in July 2020 on everolimus and steroids, cellcept on hold due to infection. Right plantar osteo s/p debridement , was on IV cefepime for ~2 weeks s/p graft, appears clean and no signs of infection. Admitted with MISA. Serum creatinine was 1.3-1.5mg/dL on previous admission few weeks ago.     1. Non Oliguric MISA likely due to AIN from cefepime vs ATN in the setting of sepsis- U/A with leukocytes on admission but cultures remain negative.   Non obstructive left distal ureteric stone on CT. U/S with enlarged kidneys, no olga hydronephrosis  Plan -HD today with no UF     2. Fever- C diff infection, likely AIN, Heel Osteomyelitis- repeat BC and fungitell. Appreciate ID recs. Currently on PO Vancomycin and Meropeneam.  3. Anemia - continue retacrit 10,000 units sq weekly   4. Metabolic acidosis - Continue sodium bicarb 1300mg po TID for  5.  Liver Transplant Recipient- managed by hepatology

## 2020-12-21 NOTE — PROGRESS NOTE ADULT - ASSESSMENT
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, CNI Neurotoxicity), IDDM, hyperlipidemia, obesity, HFpEF with mild LV diastolic dysfunction, MGUS who presented with fevers, diarrhea and abnormal labwork    Of note, recent admission for R Heel Osteomyelitis   s/p right foot heel incision and drainage w/ application of stravix and bone biopsy on 11/24  Superficial cultures with Pseudomonas and Operative Cultures with Coag Neg Staph in fluid media  Discharged on IV Cefepime with plan for 6 week course    On 12/14 labwork was noted to have MISA and Transaminitis  Of note [facility] outpatient labs from 12/9 with Cr of 1.41 and LFT's WNL    U/A (12/15, 12/17, 12/20), with pyuria  UCx (12/15, 12/17) with NGTD  COVID19 PCR (12/14) Negative  RVP / COVID19 PCR (12/17, 12/20) Negative  C diff toxin assay (12/15) indeterminate with Positive C diff PCR  CXR (12/15, 12/21) without pulmonary infiltrates  CT A/P (12/15) with distal left ureteral calculus without hydronephrosis and Diffuse wall thickening, slightly asymmetric on the left but without prostate abscess.   Renal US (12/16) with left collecting system fullness but no hydronephrosis    Curiously patient's diarrhea resolved in under 24 hours of PO Vancomycin therapy which does raise question if the PCR picked up colonization as opposed to true infection. We will clearly have to cover with full course for C diff.    He was also noted to have pyuria on his U/A, with bladder wall thickening and some left ureteral prominence with nonobstructing calculus. 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 Now with no growth to date on his urine cultures.     The patient's heel appears without cellulitis or drainage so I doubt that this is the source of his fever.     With regards to his MISA etiology is also unclear - could be from AIN from Cefepime or alternate agent. Urine eosinophils were positive. He also had could have ATN from diarrhea and intravascular volume depletion at home but no significant hypotension here so this does bring ATN into question. Reasonable to avoid Cefepime at this time and cover with Meropenem (which would cover his previous ESBL isolates and the Pseudomonas from his osteomyelitis)    With regards to his persistent fever: unclear etiology at this point. He does not appear toxic and culture data is unrevealing. Possible it is noninfectious. He did have transaminitis on presentation and now only with alk phos elevation. Initially question of rejection was raised but with normalization of LFT's this appears less likely. Possible the AIN (if this is the cause of the MISA) is contributing to his fevers. If persistent fevers would pursue CT C/A/P and consider repeat MRI R Heel to rule out fluid collection underneath the fibrotic based wound.    He does note dysgeusia for the past 2 days and possible anosmia but no respiratory symptoms. 1 COVID19 PCR and 2 RVP were negative. Can check COVID19 Antibody. If persistent fevers reasonable to repeat COVID19 PCR in 48-72H.    Overall, C diff infection, Fever, Transaminitis, MISA, Abnormal Urinalysis, Nephrolithiasis, Heel Osteomyelitis, Liver Transplant Recipient    # Acute kidney injury / Abnormal Urinalysis / Nephrolithiasis   - Continue Meropenem 500 mg IV q24 hours; now off of Cefepime given ?AIN  - Management of MISA per transplant nephrology    # Fever / Transaminitis  - Recommend COVID19 Antibody, EBV PCR and Repeat CMV PCR  - If persistent fevers would pursue CT C/A/P and consider repeat MRI R Heel  - Continue Meropenem 500 mg IV q24 hours (will need to increase to 500 mg IV Q12H if renal recovery)  - If clinical deterioration / instability recommend Daptomycin (Prior VRE infection post-transplant)  - Repeat BCx, Procalcitonin, Fungitell sent - followup on result     # C. diff Infection / Diarrhea  - If continued loose stool recommend GI PCR (although stools have been formed)  - Continue oral vancomycin 125 mg PO q6 hours x 14 days     # RLE Heel OM  - Continue meropenem to 500 mg IV q24 hours (will need to increase to 500 mg IV Q12H if renal recovery)    #Liver Transplant Recipient  - Continue Valcyte Twice weekly given drop in renal function     I will be away tomorrow. Please contact the Infectious Diseases Office to contact the covering Infectious Diseases Attending.     Eusebio Pérez M.D.  [facility] Division of Infectious Disease  8AM-5PM: Pager Number [phone]  After Hours (or if no response): Please contact the Infectious Diseases Office at [phone]     The above assessment and plan were discussed with Dr Letitia Mo, Transplant Surgery PA's 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, CNI Neurotoxicity), IDDM, hyperlipidemia, obesity, HFpEF with mild LV diastolic dysfunction, MGUS who presented with fevers, diarrhea and abnormal labwork    Of note, recent admission for R Heel Osteomyelitis   s/p right foot heel incision and drainage w/ application of stravix and bone biopsy on 11/24  Superficial cultures with Pseudomonas and Operative Cultures with Coag Neg Staph in fluid media  Discharged on IV Cefepime with plan for 6 week course    On 12/14 labwork was noted to have MISA and Transaminitis  Of note [facility] outpatient labs from 12/9 with Cr of 1.41 and LFT's WNL    U/A (12/15, 12/17, 12/20), with pyuria  UCx (12/15, 12/17) with NGTD  COVID19 PCR (12/14) Negative  RVP / COVID19 PCR (12/17, 12/20) Negative  C diff toxin assay (12/15) indeterminate with Positive C diff PCR  CXR (12/15, 12/21) without pulmonary infiltrates  CT A/P (12/15) with distal left ureteral calculus without hydronephrosis and Diffuse wall thickening, slightly asymmetric on the left but without prostate abscess.   Renal US (12/16) with left collecting system fullness but no hydronephrosis    Curiously patient's diarrhea resolved in under 24 hours of PO Vancomycin therapy which does raise question if the PCR picked up colonization as opposed to true infection. We will clearly have to cover with full course for C diff.    He was also noted to have pyuria on his U/A, with bladder wall thickening and some left ureteral prominence with nonobstructing calculus. 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 Now with no growth to date on his urine cultures.     The patient's heel appears without cellulitis or drainage so I doubt that this is the source of his fever.     With regards to his MISA etiology is also unclear - could be from AIN from Cefepime or alternate agent. Urine eosinophils were positive. He also had could have ATN from diarrhea and intravascular volume depletion at home but no significant hypotension here so this does bring ATN into question. Reasonable to avoid Cefepime at this time and cover with Meropenem (which would cover his previous ESBL isolates and the Pseudomonas from his osteomyelitis)    With regards to his persistent fever: unclear etiology at this point. He does not appear toxic and culture data is unrevealing. Possible it is noninfectious. He did have transaminitis on presentation and now only with alk phos elevation. Initially question of rejection was raised but with normalization of LFT's this appears less likely. Possible the AIN (if this is the cause of the MISA) is contributing to his fevers. If persistent fevers would pursue CT C/A/P and consider repeat MRI R Heel to rule out fluid collection underneath the fibrotic based wound.    He does note dysgeusia for the past 2 days and possible anosmia but no respiratory symptoms. 1 COVID19 PCR and 2 RVP were negative. Can check COVID19 Antibody. If persistent fevers reasonable to repeat COVID19 PCR in 48-72H.    Overall, C diff infection, Fever, Transaminitis, MISA, Abnormal Urinalysis, Nephrolithiasis, Heel Osteomyelitis, Liver Transplant Recipient    # Acute kidney injury / Abnormal Urinalysis / Nephrolithiasis   - Continue Meropenem 500 mg IV q24 hours; now off of Cefepime given ?AIN  - Management of MISA per transplant nephrology    # Fever / Transaminitis  - Recommend COVID19 Antibody, EBV PCR and Repeat CMV PCR  - If persistent fevers would pursue CT C/A/P and consider repeat MRI R Heel  - Continue Meropenem 500 mg IV q24 hours (will need to increase to 500 mg IV Q12H if renal recovery)  - If clinical deterioration / instability recommend Daptomycin (Prior VRE infection post-transplant)  - Repeat BCx, Procalcitonin, Fungitell sent - followup on result     # C. diff Infection / Diarrhea  - If continued loose stool recommend GI PCR (although stools have been formed)  - Continue oral vancomycin 125 mg PO q6 hours x 14 days     # RLE Heel OM  - Continue meropenem to 500 mg IV q24 hours (will need to increase to 500 mg IV Q12H if renal recovery)    #Liver Transplant Recipient  - Continue Valcyte Twice weekly (Increase to TIW if renal recovery)  - Bactrim on hold due to MISA - can consider adding Mepron    I will be away tomorrow. Please contact the Infectious Diseases Office to contact the covering Infectious Diseases Attending.     Eusebio Pérez M.D.  [facility] Division of Infectious Disease  8AM-5PM: Pager Number [phone]  After Hours (or if no response): Please contact the Infectious Diseases Office at [phone]     The above assessment and plan were discussed with Dr Letitia Mo, Transplant Surgery PA's

## 2020-12-21 NOTE — PROGRESS NOTE ADULT - SUBJECTIVE AND OBJECTIVE BOX
[facility] DIVISION OF KIDNEY DISEASES AND HYPERTENSION -- FOLLOW UP NOTE  --------------------------------------------------------------------------------  Chief Complaint:    24 hour events/subjective:  Patient was seen and examined at bedside  fever of 100.9 last night  BUN/Cr 71/5.4 today     PAST HISTORY  --------------------------------------------------------------------------------  No significant changes to PMH, PSH, FHx, SHx, unless otherwise noted    ALLERGIES & MEDICATIONS  --------------------------------------------------------------------------------  Allergies    codeine (Anaphylaxis)    Intolerances      Standing Inpatient Medications  aspirin  chewable 81 milliGRAM(s) Oral daily  chlorhexidine 2% Cloths 1 Application(s) Topical <User Schedule>  epoetin barry-epbx (RETACRIT) Injectable 94788 Unit(s) IV Push every 7 days  everolimus (ZORTRESS) 2 milliGRAM(s) Oral <User Schedule>  everolimus (ZORTRESS) 2 milliGRAM(s) Oral <User Schedule>  heparin   Injectable 5000 Unit(s) SubCutaneous every 12 hours  insulin glargine Injectable (LANTUS) 15 Unit(s) SubCutaneous at bedtime  insulin lispro (ADMELOG) corrective regimen sliding scale   SubCutaneous three times a day before meals  insulin lispro (ADMELOG) corrective regimen sliding scale   SubCutaneous at bedtime  insulin lispro Injectable (ADMELOG) 8 Unit(s) SubCutaneous three times a day before meals  magnesium oxide 400 milliGRAM(s) Oral daily  meropenem  IVPB 500 milliGRAM(s) IV Intermittent <User Schedule>  nystatin    Suspension 090247 Unit(s) Oral four times a day  ofloxacin 0.3% Solution 10 Drop(s) Right Ear two times a day  pantoprazole    Tablet 40 milliGRAM(s) Oral before breakfast  predniSONE   Tablet 5 milliGRAM(s) Oral two times a day  sodium bicarbonate 1300 milliGRAM(s) Oral three times a day  tamsulosin 0.4 milliGRAM(s) Oral at bedtime  valGANciclovir 450 milliGRAM(s) Oral <User Schedule>  vancomycin    Solution 125 milliGRAM(s) Oral every 6 hours    PRN Inpatient Medications      REVIEW OF SYSTEMS  --------------------------------------------------------------------------------  Gen: No fatigue, fevers/chills, weakness  Skin: No rashes  Head/Eyes/Ears/Mouth: No headache;No sore throat  Respiratory: No dyspnea, cough,   CV: No chest pain, PND, orthopnea  GI: No abdominal pain, diarrhea, constipation, nausea, vomiting  Transplant: No pain  : No increased frequency, dysuria, hematuria, nocturia  MSK: No joint pain/swelling; no back pain; no edema  Neuro: No dizziness/lightheadedness, weakness, seizures, numbness, tingling  Psych: No significant nervousness, anxiety, stress, depression    All other systems were reviewed and are negative, except as noted.    VITALS/PHYSICAL EXAM  --------------------------------------------------------------------------------  T(C): 37 (12-21-20 @ 07:00), Max: 38.3 (12-20-20 @ 23:00)  HR: 76 (12-21-20 @ 11:00) (76 - 95)  BP: 149/73 (12-21-20 @ 11:00) (134/70 - 181/83)  RR: 22 (12-21-20 @ 11:00) (9 - 28)  SpO2: 96% (12-21-20 @ 11:00) (90% - 97%)  Wt(kg): --        12-20-20 @ 07:01  -  12-21-20 @ 07:00  --------------------------------------------------------  IN: 1700 mL / OUT: 1200 mL / NET: 500 mL    12-21-20 @ 07:01  -  12-21-20 @ 12:10  --------------------------------------------------------  IN: 150 mL / OUT: 200 mL / NET: -50 mL      Physical Exam:  	Gen: NAD  	HEENT: PERRL, supple neck, clear oropharynx  	Pulm: CTA B/L  	CV: RRR, S1S2; no rub  	Back: No spinal or CVA tenderness; no sacral edema  	Abd: +BS, soft, nontender/nondistended                      Transplant: No tenderness, swelling  	: No suprapubic tenderness  	UE: Warm, FROM; no edema; no asterixis  	LE: Warm, FROM; no edema  	Neuro: No focal deficits  	Psych: Normal affect and mood  	Skin: Warm, without rashes      LABS/STUDIES  --------------------------------------------------------------------------------              8.1    4.58  >-----------<  185      [12-20-20 @ 21:44]              25.3     137  |  100  |  71  ----------------------------<  271      [12-20-20 @ 21:44]  4.6   |  21  |  5.45        Ca     8.3     [12-20-20 @ 21:44]      Mg     2.2     [12-20-20 @ 21:44]      Phos  4.3     [12-20-20 @ 21:44]    TPro  5.9  /  Alb  2.9  /  TBili  0.2  /  DBili  x   /  AST  32  /  ALT  37  /  AlkPhos  217  [12-20-20 @ 21:44]    PT/INR: PT 12.6 , INR 1.05       [12-20-20 @ 21:44]  PTT: 29.8       [12-20-20 @ 21:44]      Creatinine Trend:  SCr 5.45 [12-20 @ 21:44]  SCr 5.62 [12-20 @ 06:05]  SCr 6.94 [12-19 @ 05:29]  SCr 6.63 [12-18 @ 16:14]  SCr 6.60 [12-18 @ 06:17]      Urinalysis - [12-20-20 @ 10:18]      Color Yellow / Appearance Slightly Turbid / SG 1.010 / pH 6.0      Gluc 500 mg/dL / Ketone Negative  / Bili Negative / Urobili Negative       Blood Moderate / Protein 100 / Leuk Est Large / Nitrite Negative      RBC 9 /  / Hyaline 0 / Gran  / Sq Epi  / Non Sq Epi 0 / Bacteria Few    Urine Creatinine 67      [12-15-20 @ 11:39]  Urine Protein 126      [12-17-20 @ 14:14]  Urine Sodium 44      [12-15-20 @ 11:39]  Urine Urea Nitrogen 367      [12-15-20 @ 15:35]  Urine Chloride 43      [12-15-20 @ 11:39]  Urine Osmolality 311      [12-15-20 @ 11:39]    Iron 160, TIBC Unable to calculate Test Repeated, %sat Unable to calculate Test Repeated      [06-12-20 @ 09:04]  Ferritin 493      [04-29-20 @ 08:20]  HbA1c 6.4      [02-12-20 @ 17:08]  TSH 3.26      [04-26-20 @ 09:47]  Lipid: chol 158, , HDL 44, LDL 93      [08-05-20 @ 08:57]    HBsAg Nonreact      [12-19-20 @ 14:40]  HCV 0.06, Nonreact      [12-19-20 @ 14:40]    C3 Complement 158      [12-17-20 @ 16:43]  C4 Complement 38      [12-17-20 @ 16:43]

## 2020-12-21 NOTE — PROGRESS NOTE ADULT - SUBJECTIVE AND OBJECTIVE BOX
HISTORY  65y Male with PMHx of IDDM, HLD, obesity, HFpEF with mild LV diastolic dysfunction, MGUS, chronic anemia with a history of duodenal ulcer 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  Underwent OLT on 7/2/2020, post op course c/b VRE bacteremia tx with linezolid and delirium likely in setting of CNI toxicity (FK discontinued/Everolimus initiated 7/27).    Recent admission from 11/20-12/2 for OM of the R heel, s/p debridement with Podiatry, and was discharged with Cefepime x 6 weeks via PICC line (placed 12/1). Initial wound cx on admission grew Pseudomonas, OR cx grew staph epi. Was also found with neutropenia and Valcyte was reduced (was on 900mg daily CMV +/-).      Sent to ED from clinic with rising transaminitis and MISA (SCr 3.3 from 1.5). Hospital course complicated by C diff colitis. MISA 2/2 recent cefepime use (for OM) vs dehydration 2/2 c diff.    SICU consulted in the setting of elevated creatinine to 6.6 and increased lethargy likely in the setting of uremic encephalopathy. Transferred to SICU for hemodynamic and mental status monitoring with plan for possible HD catheter placement and initiation of HD. On 12/19, R IJ shiley placed and HD initiated for uremia.    24 HOUR EVENTS:  - 5mg. hydralazine pushed for HTN > 170.   - lantus increased to 15 units.   - Febrile to 100.8; Patient pan-cultured. Fungitell sent     SUBJECTIVE/ROS:  [ ] A ten-point review of systems was otherwise negative except as noted.  [ ] Due to altered mental status/intubation, subjective information were not able to be obtained from the patient. History was obtained, to the extent possible, from review of the chart and collateral sources of information.      NEURO  Exam: awake, alert, oriented x3  Meds:   [x] Adequacy of sedation and pain control has been assessed and adjusted      RESPIRATORY  RR: 13 (12-20-20 @ 23:00) (7 - 28)  SpO2: 90% (12-20-20 @ 23:00) (89% - 98%)  Wt(kg): --  Exam: unlabored, clear to auscultation bilaterally  Mechanical Ventilation:     [N/A] Extubation Readiness Assessed  Meds:       CARDIOVASCULAR  HR: 85 (12-20-20 @ 23:00) (79 - 93)  BP: 154/69 (12-20-20 @ 23:00) (134/70 - 170/80)  BP(mean): 99 (12-20-20 @ 23:00) (92 - 115)  ABP: --  ABP(mean): --  Wt(kg): --  CVP(cm H2O): --      Exam: regular rate and rhythm  Cardiac Rhythm: sinus  Perfusion     [x]Adequate   [ ]Inadequate  Mentation   [x]Normal       [ ]Reduced  Extremities  [x]Warm         [ ]Cool  Volume Status [ ]Hypervolemic [x]Euvolemic [ ]Hypovolemic  Meds: tamsulosin 0.4 milliGRAM(s) Oral at bedtime        GI/NUTRITION  Exam: soft, nontender, nondistended, incision C/D/I  Diet:  Meds: pantoprazole    Tablet 40 milliGRAM(s) Oral before breakfast      GENITOURINARY  I&O's Detail    12-19 @ 07:01  -  12-20 @ 07:00  --------------------------------------------------------  IN:    IV PiggyBack: 50 mL    Oral Fluid: 200 mL    Sodium Bicarbonate: 1050 mL  Total IN: 1300 mL    OUT:    Other (mL): 0 mL    Voided (mL): 1600 mL  Total OUT: 1600 mL    Total NET: -300 mL      12-20 @ 07:01  -  12-21 @ 00:20  --------------------------------------------------------  IN:    IV PiggyBack: 50 mL    Oral Fluid: 850 mL  Total IN: 900 mL    OUT:    Voided (mL): 800 mL  Total OUT: 800 mL    Total NET: 100 mL          12-20    137  |  100  |  71<H>  ----------------------------<  271<H>  4.6   |  21<L>  |  5.45<H>    Ca    8.3<L>      20 Dec 2020 21:44  Phos  4.3     12-20  Mg     2.2     12-20    TPro  5.9<L>  /  Alb  2.9<L>  /  TBili  0.2  /  DBili  x   /  AST  32  /  ALT  37  /  AlkPhos  217<H>  12-20    [ ] Beasley catheter, indication: N/A  Meds: magnesium oxide 400 milliGRAM(s) Oral daily  sodium bicarbonate 1300 milliGRAM(s) Oral three times a day        HEMATOLOGIC  Meds: aspirin  chewable 81 milliGRAM(s) Oral daily  heparin   Injectable 5000 Unit(s) SubCutaneous every 12 hours    [x] VTE Prophylaxis                        8.1    4.58  )-----------( 185      ( 20 Dec 2020 21:44 )             25.3     PT/INR - ( 20 Dec 2020 21:44 )   PT: 12.6 sec;   INR: 1.05 ratio         PTT - ( 20 Dec 2020 21:44 )  PTT:29.8 sec  Transfusion     [ ] PRBC   [ ] Platelets   [ ] FFP   [ ] Cryoprecipitate      INFECTIOUS DISEASES  WBC Count: 4.58 K/uL (12-20 @ 21:44)  WBC Count: 4.29 K/uL (12-20 @ 06:05)    RECENT CULTURES:  Specimen Source: .Urine Catheterized  Date/Time: 12-17 @ 20:08  Culture Results:   No growth  Gram Stain: --  Organism: --    Meds: epoetin barry-epbx (RETACRIT) Injectable 37465 Unit(s) IV Push every 7 days  everolimus (ZORTRESS) 1.5 milliGRAM(s) Oral <User Schedule>  everolimus (ZORTRESS) 2 milliGRAM(s) Oral <User Schedule>  meropenem  IVPB 500 milliGRAM(s) IV Intermittent <User Schedule>  nystatin    Suspension 089170 Unit(s) Oral four times a day  valGANciclovir 450 milliGRAM(s) Oral <User Schedule>  vancomycin    Solution 125 milliGRAM(s) Oral every 6 hours        ENDOCRINE  CAPILLARY BLOOD GLUCOSE      POCT Blood Glucose.: 268 mg/dL (20 Dec 2020 21:06)  POCT Blood Glucose.: 282 mg/dL (20 Dec 2020 17:14)  POCT Blood Glucose.: 222 mg/dL (20 Dec 2020 12:07)  POCT Blood Glucose.: 182 mg/dL (20 Dec 2020 08:12)  POCT Blood Glucose.: 241 mg/dL (20 Dec 2020 06:45)    Meds: insulin glargine Injectable (LANTUS) 15 Unit(s) SubCutaneous at bedtime  insulin lispro (ADMELOG) corrective regimen sliding scale   SubCutaneous at bedtime  insulin lispro (ADMELOG) corrective regimen sliding scale   SubCutaneous three times a day before meals  insulin lispro Injectable (ADMELOG) 5 Unit(s) SubCutaneous three times a day before meals  predniSONE   Tablet 5 milliGRAM(s) Oral two times a day        ACCESS DEVICES:  [x] Peripheral IV  [ ] Central Venous Line	[x] R	[ ] L	[x] IJ	[ ] Fem	[ ] SC	Placed:   [ ] Arterial Line		[ ] R	[ ] L	[ ] Fem	[ ] Rad	[ ] Ax	Placed:   [ ] PICC:					[ ] Mediport  [ ] Urinary Catheter, Date Placed:   [x] Necessity of urinary, arterial, and venous catheters discussed    OTHER MEDICATIONS:  chlorhexidine 2% Cloths 1 Application(s) Topical <User Schedule>  ofloxacin 0.3% Solution 10 Drop(s) Right Ear two times a day      CODE STATUS:      IMAGING:    Assessment and Plan:   · Assessment	  65y Male with PMHx of IDDM, HLD, obesity, HFpEF with mild LV diastolic dysfunction, MGUS, chronic anemia with a history of duodenal ulcer as well as GAVE and duodenal AVM s/p APC (last on 10/11/19), decompensated JEAN/Cirrhosis 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. Recent admission from 11/20-12/2 for OM of the R heel, s/p debridement with Podiatry, and was discharged with Cefepime x 6 weeks via PICC line (placed 12/1). Sent to ED from clinic with rising transaminitis and MISA (SCr 3.3 from 1.5). Hospital course complicated by C diff colitis. MISA 2/2 recent cefepime use (for OM) vs dehydration 2/2 c diff. SICU consulted in the setting of elevated creatinine to 6.6 and increased lethargy likely in the setting of uremic encephalopathy. Transferred to SICU for hemodynamic and mental status monitoring with plan for possible HD catheter placement and initiation of HD. Currently no emergent indications for HD.    PLAN:  Neuro: uremic encephalopathy  - Monitor mental status  - Currently not in pain, no pain meds ordered.  - If pain develops, avoid narcotics due to AMS    Resp: no acute issues  - Monitor pulse oximeter, saturating well on room air  - Out of bed to chair, ambulate as tolerated  - Currently protecting airway    CV: HFpEF with mild LV diastolic dysfunction  - Monitor vital signs, currently stable  - 5mg. hydralazine pushed for HTN > 170.     GI: duodenal ulcer, GAVE, duodenal AVM s/p APC (last on 10/11/19), decompensated JEAN/Cirrhosis s/p OLT   - renal diet with restrictions.  - Home protonix    Renal: MISA (baseline Cr 1.5)  - Monitor I&Os  - Monitor electrolytes in setting of MISA  - Bicarb gtt (75mEq) @ 75cc/hr + Bicarb 1,300mg PO TID for decreased bicarb in setting of MISA per Transplant Sx  - shiley placed on 12/19.   - last HD on 12/19, net zero for complete session.     Heme: chronic anemia, MGUS  - Monitor CBC and coags  - HSq for VTE prophylaxis  - Home ASA    ID: immunosuppression in setting of recent OLT, C diff colitis, abnormal UA  - Monitor for clinical evidence of active infection  - Immunosuppression with everolimus and prednisone  - Meropenem for abnormal UA in setting of nephrolithiasis  - Valcyte and nystatin for ppx in setting of immunosuppression  - PO vancomycin for C diff  - Ofloxacin ear drops for Right otitis media per ENT   - Febrile to 100.8 evening of 12/20; Patient pan-cultured. Fungitell sent     Endo: hx DM, HLD  - Monitor glucose before meals and at bedtime with ISS  - Insulin 5U premeal and Lantus 15 units at bedtime    Disposition:  - Full code  - SICU

## 2020-12-21 NOTE — PROGRESS NOTE ADULT - ATTENDING COMMENTS
Pt seen and examined with resident and PA team, agree with above.     1. C. diff colitis during treatment with cefepime for R heel osteo:  - Cefepime has been discontinued  - On po vanc with improvement in diarrhea  - Diet    2. R heel osteo, UTI, continuing fevers:  - Superficial wound swab in November in ED grew Pseudomonas, likely contam; calcaneal bone culture with S. epi  - Was on cefepime, now on meropenem  - On po vanc for C. diff  - On ofloxacin gtt for otitis externa;     3. MISA stage 3, ?AIN (urine eosinophils) related to cefepime? on CKD 3 (baseline Cr 1.2-1.5)  - Off cefepime currently  - Avoid nephrotoxic meds  - No emergent need for HD currently, is making urine. Has received HD on Saturday, plan for HD today via L[facility] Zhao (placed 12/19)  - On bicarb po for metabolic acidosis  - Med doses adjusted for eGFR    4. Transaminitis s/p OLT in July 2020:  - Resolved, although AP still elevated  - Continue immunosuppression per Tx recs, check everolimus level, continue prednisone  - Recheck CMV titer tomorrow per Tx recs    5. Poor po intake:  - Hold premeal insulin when not eating well  - Pt says his sense of taste is different and he doesn't want to eat because things taste different  - Have ordered nutritional supplements, will encourage po intake  - COVID negative five times since Nov 20    6. DM type 2 on long-term insulin  - HbA1c 8.6%  - Home meds: Lantus 10 units qhs, Humalog 10 units premeal  - Currently on Lantus 15 units qhs, Admelog 5 units premeal  - Holding premeal insulin unless pt eating, as noted above  - Will need to discuss whether pt was not compliant with insulin regimen at home and explore why, or whether pt was compliant and has not been on adequate insulin coverage given HbA1c result (8.6%).    7. HFpEF (mild diastolic dysfunction):  - Home meds: Lasix 20mg daily  - Holding lasix currently given MISA stage 3 on CKD 3    8. GAVE, DU, duodenal AVM s/p APC:  - Home meds: Protonix 40mg daily  - Continue    9. BPH:  - Home meds: tamsulosin 0.4mg daily  - Continue

## 2020-12-22 ENCOUNTER — APPOINTMENT (OUTPATIENT)
Dept: WOUND CARE | Facility: CLINIC | Age: 65
End: 2020-12-22

## 2020-12-22 LAB
ALBUMIN SERPL ELPH-MCNC: 2.9 G/DL — LOW (ref 3.3–5)
ALP SERPL-CCNC: 173 U/L — HIGH (ref 40–120)
ALT FLD-CCNC: 28 U/L — SIGNIFICANT CHANGE UP (ref 10–45)
AMMONIA BLD-MCNC: 25 UMOL/L — SIGNIFICANT CHANGE UP (ref 11–55)
ANION GAP SERPL CALC-SCNC: 14 MMOL/L — SIGNIFICANT CHANGE UP (ref 5–17)
APTT BLD: 28 SEC — SIGNIFICANT CHANGE UP (ref 27.5–35.5)
AST SERPL-CCNC: 27 U/L — SIGNIFICANT CHANGE UP (ref 10–40)
BASOPHILS # BLD AUTO: 0.01 K/UL — SIGNIFICANT CHANGE UP (ref 0–0.2)
BASOPHILS NFR BLD AUTO: 0.2 % — SIGNIFICANT CHANGE UP (ref 0–2)
BILIRUB SERPL-MCNC: 0.1 MG/DL — LOW (ref 0.2–1.2)
BUN SERPL-MCNC: 77 MG/DL — HIGH (ref 7–23)
CALCIUM SERPL-MCNC: 8.4 MG/DL — SIGNIFICANT CHANGE UP (ref 8.4–10.5)
CHLORIDE SERPL-SCNC: 99 MMOL/L — SIGNIFICANT CHANGE UP (ref 96–108)
CO2 SERPL-SCNC: 22 MMOL/L — SIGNIFICANT CHANGE UP (ref 22–31)
CREAT SERPL-MCNC: 5.55 MG/DL — HIGH (ref 0.5–1.3)
EBV EA AB SER IA-ACNC: <5 U/ML — SIGNIFICANT CHANGE UP
EBV EA AB TITR SER IF: POSITIVE
EBV EA IGG SER-ACNC: NEGATIVE — SIGNIFICANT CHANGE UP
EBV NA IGG SER IA-ACNC: >600 U/ML — HIGH
EBV PATRN SPEC IB-IMP: SIGNIFICANT CHANGE UP
EBV VCA IGG AVIDITY SER QL IA: POSITIVE
EBV VCA IGM SER IA-ACNC: 739 U/ML — HIGH
EBV VCA IGM SER IA-ACNC: <10 U/ML — SIGNIFICANT CHANGE UP
EBV VCA IGM TITR FLD: NEGATIVE — SIGNIFICANT CHANGE UP
EOSINOPHIL # BLD AUTO: 0.03 K/UL — SIGNIFICANT CHANGE UP (ref 0–0.5)
EOSINOPHIL NFR BLD AUTO: 0.6 % — SIGNIFICANT CHANGE UP (ref 0–6)
GLUCOSE BLDC GLUCOMTR-MCNC: 151 MG/DL — HIGH (ref 70–99)
GLUCOSE BLDC GLUCOMTR-MCNC: 167 MG/DL — HIGH (ref 70–99)
GLUCOSE BLDC GLUCOMTR-MCNC: 188 MG/DL — HIGH (ref 70–99)
GLUCOSE BLDC GLUCOMTR-MCNC: 208 MG/DL — HIGH (ref 70–99)
GLUCOSE SERPL-MCNC: 172 MG/DL — HIGH (ref 70–99)
HCT VFR BLD CALC: 23 % — LOW (ref 39–50)
HGB BLD-MCNC: 7.5 G/DL — LOW (ref 13–17)
IMM GRANULOCYTES NFR BLD AUTO: 1.2 % — SIGNIFICANT CHANGE UP (ref 0–1.5)
INR BLD: 1.09 RATIO — SIGNIFICANT CHANGE UP (ref 0.88–1.16)
LYMPHOCYTES # BLD AUTO: 0.52 K/UL — LOW (ref 1–3.3)
LYMPHOCYTES # BLD AUTO: 10.2 % — LOW (ref 13–44)
MAGNESIUM SERPL-MCNC: 2 MG/DL — SIGNIFICANT CHANGE UP (ref 1.6–2.6)
MCHC RBC-ENTMCNC: 28.8 PG — SIGNIFICANT CHANGE UP (ref 27–34)
MCHC RBC-ENTMCNC: 32.6 GM/DL — SIGNIFICANT CHANGE UP (ref 32–36)
MCV RBC AUTO: 88.5 FL — SIGNIFICANT CHANGE UP (ref 80–100)
MONOCYTES # BLD AUTO: 0.35 K/UL — SIGNIFICANT CHANGE UP (ref 0–0.9)
MONOCYTES NFR BLD AUTO: 6.9 % — SIGNIFICANT CHANGE UP (ref 2–14)
NEUTROPHILS # BLD AUTO: 4.11 K/UL — SIGNIFICANT CHANGE UP (ref 1.8–7.4)
NEUTROPHILS NFR BLD AUTO: 80.9 % — HIGH (ref 43–77)
NRBC # BLD: 0 /100 WBCS — SIGNIFICANT CHANGE UP (ref 0–0)
PHOSPHATE SERPL-MCNC: 4.9 MG/DL — HIGH (ref 2.5–4.5)
PLATELET # BLD AUTO: 174 K/UL — SIGNIFICANT CHANGE UP (ref 150–400)
POTASSIUM SERPL-MCNC: 4.5 MMOL/L — SIGNIFICANT CHANGE UP (ref 3.5–5.3)
POTASSIUM SERPL-SCNC: 4.5 MMOL/L — SIGNIFICANT CHANGE UP (ref 3.5–5.3)
PROT SERPL-MCNC: 5.8 G/DL — LOW (ref 6–8.3)
PROTHROM AB SERPL-ACNC: 13 SEC — SIGNIFICANT CHANGE UP (ref 10.6–13.6)
RBC # BLD: 2.6 M/UL — LOW (ref 4.2–5.8)
RBC # FLD: 13.7 % — SIGNIFICANT CHANGE UP (ref 10.3–14.5)
SODIUM SERPL-SCNC: 135 MMOL/L — SIGNIFICANT CHANGE UP (ref 135–145)
WBC # BLD: 5.08 K/UL — SIGNIFICANT CHANGE UP (ref 3.8–10.5)
WBC # FLD AUTO: 5.08 K/UL — SIGNIFICANT CHANGE UP (ref 3.8–10.5)

## 2020-12-22 PROCEDURE — 99232 SBSQ HOSP IP/OBS MODERATE 35: CPT

## 2020-12-22 PROCEDURE — 99232 SBSQ HOSP IP/OBS MODERATE 35: CPT | Mod: GC

## 2020-12-22 PROCEDURE — 99233 SBSQ HOSP IP/OBS HIGH 50: CPT

## 2020-12-22 RX ORDER — ACETAMINOPHEN 500 MG
650 TABLET ORAL ONCE
Refills: 0 | Status: COMPLETED | OUTPATIENT
Start: 2020-12-22 | End: 2020-12-22

## 2020-12-22 RX ORDER — DESMOPRESSIN ACETATE 0.1 MG/1
34 TABLET ORAL ONCE
Refills: 0 | Status: COMPLETED | OUTPATIENT
Start: 2020-12-22 | End: 2020-12-22

## 2020-12-22 RX ADMIN — INSULIN GLARGINE 15 UNIT(S): 100 INJECTION, SOLUTION SUBCUTANEOUS at 00:07

## 2020-12-22 RX ADMIN — EVEROLIMUS 2 MILLIGRAM(S): 10 TABLET ORAL at 20:52

## 2020-12-22 RX ADMIN — TAMSULOSIN HYDROCHLORIDE 0.4 MILLIGRAM(S): 0.4 CAPSULE ORAL at 00:07

## 2020-12-22 RX ADMIN — Medication 5 MILLIGRAM(S): at 17:43

## 2020-12-22 RX ADMIN — Medication 500000 UNIT(S): at 17:39

## 2020-12-22 RX ADMIN — Medication 10 DROP(S): at 06:13

## 2020-12-22 RX ADMIN — Medication 125 MILLIGRAM(S): at 00:07

## 2020-12-22 RX ADMIN — Medication 500000 UNIT(S): at 12:04

## 2020-12-22 RX ADMIN — Medication 81 MILLIGRAM(S): at 12:03

## 2020-12-22 RX ADMIN — Medication 2: at 12:04

## 2020-12-22 RX ADMIN — Medication 1300 MILLIGRAM(S): at 00:06

## 2020-12-22 RX ADMIN — MAGNESIUM OXIDE 400 MG ORAL TABLET 400 MILLIGRAM(S): 241.3 TABLET ORAL at 12:03

## 2020-12-22 RX ADMIN — Medication 5 UNIT(S): at 12:05

## 2020-12-22 RX ADMIN — Medication 1300 MILLIGRAM(S): at 06:13

## 2020-12-22 RX ADMIN — DESMOPRESSIN ACETATE 234 MICROGRAM(S): 0.1 TABLET ORAL at 10:42

## 2020-12-22 RX ADMIN — EVEROLIMUS 2 MILLIGRAM(S): 10 TABLET ORAL at 08:10

## 2020-12-22 RX ADMIN — Medication 650 MILLIGRAM(S): at 21:21

## 2020-12-22 RX ADMIN — Medication 650 MILLIGRAM(S): at 20:51

## 2020-12-22 RX ADMIN — Medication 125 MILLIGRAM(S): at 06:13

## 2020-12-22 RX ADMIN — TAMSULOSIN HYDROCHLORIDE 0.4 MILLIGRAM(S): 0.4 CAPSULE ORAL at 20:53

## 2020-12-22 RX ADMIN — PANTOPRAZOLE SODIUM 40 MILLIGRAM(S): 20 TABLET, DELAYED RELEASE ORAL at 08:10

## 2020-12-22 RX ADMIN — Medication 1300 MILLIGRAM(S): at 20:52

## 2020-12-22 RX ADMIN — Medication 10 DROP(S): at 17:39

## 2020-12-22 RX ADMIN — HEPARIN SODIUM 5000 UNIT(S): 5000 INJECTION INTRAVENOUS; SUBCUTANEOUS at 17:43

## 2020-12-22 RX ADMIN — Medication 125 MILLIGRAM(S): at 17:39

## 2020-12-22 RX ADMIN — MEROPENEM 100 MILLIGRAM(S): 1 INJECTION INTRAVENOUS at 18:06

## 2020-12-22 RX ADMIN — VALGANCICLOVIR 450 MILLIGRAM(S): 450 TABLET, FILM COATED ORAL at 09:39

## 2020-12-22 RX ADMIN — Medication 5 UNIT(S): at 17:36

## 2020-12-22 RX ADMIN — Medication 500000 UNIT(S): at 00:06

## 2020-12-22 RX ADMIN — Medication 5 MILLIGRAM(S): at 06:13

## 2020-12-22 RX ADMIN — HEPARIN SODIUM 5000 UNIT(S): 5000 INJECTION INTRAVENOUS; SUBCUTANEOUS at 06:17

## 2020-12-22 RX ADMIN — CHLORHEXIDINE GLUCONATE 1 APPLICATION(S): 213 SOLUTION TOPICAL at 06:17

## 2020-12-22 RX ADMIN — Medication 2: at 08:11

## 2020-12-22 RX ADMIN — Medication 500000 UNIT(S): at 06:16

## 2020-12-22 RX ADMIN — Medication 5 UNIT(S): at 08:11

## 2020-12-22 RX ADMIN — Medication 4: at 17:36

## 2020-12-22 RX ADMIN — Medication 1300 MILLIGRAM(S): at 13:47

## 2020-12-22 RX ADMIN — Medication 125 MILLIGRAM(S): at 12:03

## 2020-12-22 RX ADMIN — Medication 500000 UNIT(S): at 20:54

## 2020-12-22 RX ADMIN — INSULIN GLARGINE 15 UNIT(S): 100 INJECTION, SOLUTION SUBCUTANEOUS at 20:53

## 2020-12-22 RX ADMIN — Medication 125 MILLIGRAM(S): at 20:52

## 2020-12-22 NOTE — PROGRESS NOTE ADULT - SUBJECTIVE AND OBJECTIVE BOX
[facility]  Ramón Morales PA-C  [address] [place] [address]  [phone]      INTERVAL HPI/OVERNIGHT EVENTS:    patient s/e in ICU  events noted  diarrhea improving           MEDICATIONS  (STANDING):  aspirin  chewable 81 milliGRAM(s) Oral daily  chlorhexidine 2% Cloths 1 Application(s) Topical daily  epoetin barry-epbx (RETACRIT) Injectable 19904 Unit(s) SubCutaneous every 7 days  everolimus (ZORTRESS) 1.5 milliGRAM(s) Oral <User Schedule>  heparin   Injectable 5000 Unit(s) SubCutaneous every 12 hours  insulin glargine Injectable (LANTUS) 10 Unit(s) SubCutaneous at bedtime  insulin lispro (ADMELOG) corrective regimen sliding scale   SubCutaneous three times a day before meals  insulin lispro (ADMELOG) corrective regimen sliding scale   SubCutaneous at bedtime  insulin lispro Injectable (ADMELOG) 5 Unit(s) SubCutaneous three times a day before meals  insulin lispro Injectable (ADMELOG) 2 Unit(s) SubCutaneous once  magnesium oxide 400 milliGRAM(s) Oral daily  meropenem  IVPB 500 milliGRAM(s) IV Intermittent every 24 hours  nystatin    Suspension 962494 Unit(s) Oral four times a day  ofloxacin 0.3% Solution 10 Drop(s) Right Ear two times a day  pantoprazole    Tablet 40 milliGRAM(s) Oral before breakfast  predniSONE   Tablet 5 milliGRAM(s) Oral two times a day  sodium bicarbonate 1300 milliGRAM(s) Oral three times a day  sodium bicarbonate  Infusion 0.05 mEq/kG/Hr (75 mL/Hr) IV Continuous <Continuous>  tamsulosin 0.4 milliGRAM(s) Oral at bedtime  valGANciclovir 450 milliGRAM(s) Oral <User Schedule>  vancomycin    Solution 125 milliGRAM(s) Oral every 6 hours    MEDICATIONS  (PRN):      Allergies    codeine (Anaphylaxis)    Intolerances        ROS:   General:  No wt loss, fevers, chills, night sweats, + fatigue,   Eyes:  Good vision, no reported pain  ENT:  No sore throat, pain, runny nose, dysphagia  CV:  No pain, palpitations, hypo/hypertension  Resp:  No dyspnea, cough, tachypnea, wheezing  GI:  No pain, No nausea, No vomiting, No diarrhea, No constipation, No weight loss, No fever, No pruritis, No rectal bleeding, No tarry stools, No dysphagia,  :  No pain, bleeding, incontinence, nocturia  Muscle:  No pain, weakness  Neuro:  No weakness, tingling, memory problems  Psych:  No fatigue, insomnia, mood problems, depression  Endocrine:  No polyuria, polydipsia, cold/heat intolerance  Heme:  No petechiae, ecchymosis, easy bruisability  Skin:  No rash, tattoos, scars, edema      PHYSICAL EXAM:   Vital Signs:  Vital Signs Last 24 Hrs  T(C): 36.5 (19 Dec 2020 15:00), Max: 37.2 (19 Dec 2020 04:00)  T(F): 97.7 (19 Dec 2020 15:00), Max: 99 (19 Dec 2020 04:00)  HR: 75 (19 Dec 2020 15:00) (75 - 91)  BP: 148/68 (19 Dec 2020 15:00) (118/81 - 163/70)  BP(mean): 95 (19 Dec 2020 15:00) (90 - 106)  RR: 20 (19 Dec 2020 15:00) (16 - 20)  SpO2: 96% (19 Dec 2020 15:00) (92% - 98%)  Daily     Daily Weight in k.8 (18 Dec 2020 20:35)    GENERAL:  no distress  HEENT:  NC/AT  ABDOMEN:  Soft, non-tender, non-distended, normoactive bowel sounds  EXTEREMITIES:  no cyanosis,clubbing or edema  SKIN:  No rash/erythema/ecchymoses/petechiae/wounds/abscess/warm/dry  NEURO:  Alert, oriented, no asterixis, no tremor, no encephalopathy      LABS:                        7.4    4.50  )-----------( 179      ( 19 Dec 2020 05:29 )             22.9     12-    133<L>  |  101  |  92<H>  ----------------------------<  224<H>  4.7   |  16<L>  |  6.94<H>    Ca    8.2<L>      19 Dec 2020 05:29  Phos  4.6       Mg     2.2         TPro  5.8<L>  /  Alb  2.6<L>  /  TBili  0.1<L>  /  DBili  x   /  AST  31  /  ALT  47<H>  /  AlkPhos  222<H>  -    PT/INR - ( 19 Dec 2020 05:29 )   PT: 13.1 sec;   INR: 1.10 ratio         PTT - ( 19 Dec 2020 05:29 )  PTT:26.1 sec  Urinalysis Basic - ( 17 Dec 2020 16:54 )    Color: Light Yellow / Appearance: Slightly Turbid / S.012 / pH: x  Gluc: x / Ketone: Negative  / Bili: Negative / Urobili: Negative   Blood: x / Protein: 100 mg/dL / Nitrite: Negative   Leuk Esterase: Large / RBC: 3 /hpf /  /HPF   Sq Epi: x / Non Sq Epi: 0 /hpf / Bacteria: Negative        RADIOLOGY & ADDITIONAL TESTS:

## 2020-12-22 NOTE — PROGRESS NOTE ADULT - ASSESSMENT
65 year old man type 2 diabetes, HLD, GERD, HFpEF with mild LV diastolic dysfunction, and decompensated JEAN cirrhosis, duodenal ulcer, GAVE and duodenal AVM s/p APC (last on 10/11/19) and SHENG who is now s/p liver transplant (7/2/20) with post-op course c/b tacrolimus toxicity and hospital delirium improving after stopping CNI and replacing w/ everolimus. Patient recently presented 11/20/20 with worsening LE swelling and right foot wound with wound culture 11/24 growing S. epidermidis and calcaneus bone biopsy culture 11/24 demonstrating osteomyelitis sent home with cefepime. Patient now with sepsis and MISA likely secondary to cdiff. Patient having low grade fever.      Impression:   # Fever: none reported in last 24 hours. Unclear etiology. Has cdiff which now resolving- may have been protracted course in setting of immunosuppression. Also with c/f AIN which can cause fevers. Possible osteo? however podiatry eval appears to show low concern  # Cdiff: on po vanco since 12/15. Continues to improved from symptom standpoint. WBC wnl. No fever in 24 hours.  # MISA with c/f uremia: Mental status improving. Remains nonoliguric with 1.2L output yesterday. MISA with encephalopathy now on intermittent dialysis. Kidney slightly enlarged on u/s- may suggest AIN. nonoliguric. FeNa and FeUrea suggest possible AIN (from cefepime) vs acute tubular necrosis likely from cdiff and volume loss.  # Right foot osteomyelitis:  reviewed by podiatry, appears stable. s/p surgical debridement and graft placement on 11/24 by Podiatry. Wound culture grew pseudomonas and bone bx on 11/24 growing S. epidermidis - likely contaminant. Had PICC line, now removed. On meropenem  # Otitis externa: on floxin drops  # S/p liver transplant 7/2/20: Transaminases and INR mildly elevated likely in setting of infection.   Recipient Info:   ABO: A  CMV:  Neg  EBV Positive  Donor:  Donor ID:  [id number] Match: [id number]  Age: 29 ABO:  A1 High Risk:   No COD: Cardiovascular  Anti CMV positive, EBV IgG- positive  HepBcAb-neg, Hepatitis C-DANA- neg, Hepatitis C ab-neg  # Fever - unclear etiology, infectious workup ongoing.  # Bladder wall thickening  # Type 2 diabetes  # SHENG     Recommendations:  - appreciate Transplant nephrology reccs, at this time time continue with intermittent dialysis  - pending current hospital course, may beenfit from renal biopsy and/or empiric steroid course for AIN if fevers and renal function not improving`  - await fungitell  - continue with po vanco, meropenum, and floxin drops per ENT  - strict glycemic control, improved sugar control currently  - strict I/Os  - immunosuppressives of everolimus and prednisone per transplant surgery  - appreciate transplant ID, renal, surgery reccs  - hold bactrim given MISA  - monitor CBC, CMP, INR  - monitor fever curve; if fevers return, would consider CT A/P and MRI foot for further evaluation  - transplant hepatology to follow

## 2020-12-22 NOTE — PROGRESS NOTE ADULT - ASSESSMENT
MISA  c diff  s/p liver transplant    diarrhea improving  cont po vanco  diet as tolerated  HD per renal   lfts improving   hopefully epistaxis and dysgeusia will improve post HD  care per transplant services/SICU greatly appreciated

## 2020-12-22 NOTE — PROGRESS NOTE ADULT - SUBJECTIVE AND OBJECTIVE BOX
Chief Complaint:  Patient is a 65y old  Male who presents with a chief complaint of transaminitis (22 Dec 2020 00:14)      Interval Events: Patient reportedly complains of loss of taste. Denies chest pain, shortness of breath, cough. No fevers or chills. No further diarrhea.    Allergies:  codeine (Anaphylaxis)      Hospital Medications:  aspirin  chewable 81 milliGRAM(s) Oral daily  chlorhexidine 2% Cloths 1 Application(s) Topical <User Schedule>  epoetin barry-epbx (RETACRIT) Injectable 78485 Unit(s) IV Push every 7 days  everolimus (ZORTRESS) 2 milliGRAM(s) Oral <User Schedule>  everolimus (ZORTRESS) 2 milliGRAM(s) Oral <User Schedule>  heparin   Injectable 5000 Unit(s) SubCutaneous every 12 hours  insulin glargine Injectable (LANTUS) 15 Unit(s) SubCutaneous at bedtime  insulin lispro (ADMELOG) corrective regimen sliding scale   SubCutaneous three times a day before meals  insulin lispro (ADMELOG) corrective regimen sliding scale   SubCutaneous at bedtime  insulin lispro Injectable (ADMELOG) 5 Unit(s) SubCutaneous three times a day before meals  magnesium oxide 400 milliGRAM(s) Oral daily  meropenem  IVPB 500 milliGRAM(s) IV Intermittent <User Schedule>  nystatin    Suspension 643040 Unit(s) Oral four times a day  ofloxacin 0.3% Solution 10 Drop(s) Right Ear two times a day  pantoprazole    Tablet 40 milliGRAM(s) Oral before breakfast  predniSONE   Tablet 5 milliGRAM(s) Oral two times a day  sodium bicarbonate 1300 milliGRAM(s) Oral three times a day  tamsulosin 0.4 milliGRAM(s) Oral at bedtime  valGANciclovir 450 milliGRAM(s) Oral <User Schedule>  vancomycin    Solution 125 milliGRAM(s) Oral every 6 hours      PMHX/PSHX:  GIB (gastrointestinal bleeding)    GERD with esophagitis    Hepatic encephalopathy    Obesity    Fatty liver disease, nonalcoholic    Renal stones    Hypertension    Neuropathy    Hypercholesteremia    Diabetes    S/P cholecystectomy    No significant past surgical history        Family history:  Family history of type 2 diabetes mellitus    Family history of hypertension    Family history of stomach cancer    No pertinent family history in first degree relatives        ROS: As per HPI, 14-point ROS negative otherwise.    General:  No wt loss, fevers, chills, night sweats, fatigue,   Eyes:  Good vision, no reported pain  ENT:  No sore throat, pain, runny nose, dysphagia  CV:  No pain, palpitations, hypo/hypertension  Resp:  No dyspnea, cough, tachypnea, wheezing  GI:  See HPI  :  No pain, bleeding, incontinence, nocturia  Muscle:  No pain, weakness  Neuro:  No weakness, tingling, memory problems  Psych:  No fatigue, insomnia, mood problems, depression  Endocrine:  No polyuria, polydipsia, cold/heat intolerance  Heme:  No petechiae, ecchymosis, easy bruisability  Skin:  No rash, edema      PHYSICAL EXAM:     Vital Signs:  Vital Signs Last 24 Hrs  T(C): 37.2 (22 Dec 2020 07:00), Max: 37.3 (21 Dec 2020 19:00)  T(F): 99 (22 Dec 2020 07:00), Max: 99.1 (21 Dec 2020 19:00)  HR: 78 (22 Dec 2020 08:00) (74 - 87)  BP: 169/77 (22 Dec 2020 08:00) (139/70 - 174/77)  BP(mean): 110 (22 Dec 2020 08:00) (96 - 132)  RR: 14 (22 Dec 2020 08:00) (8 - 31)  SpO2: 98% (22 Dec 2020 08:00) (89% - 100%)  Daily     Daily Weight in k.2 (22 Dec 2020 01:50)    GENERAL:  appears comfortable, no acute distress  HEENT:  NC/AT,  conjunctivae clear, sclera -anicteric  CHEST:  no increased effort  HEART:  Regular rate and rhythm  ABDOMEN:  Soft, non-tender, non-distended,  no masses ,no hepato-splenomegaly,   EXTREMITIES:  no cyanosis, clubbing or edema  SKIN:  No rash/erythema/ecchymoses/petechiae/wounds  NEURO:  Alert, orientedx3, no asterixis    LABS:                        7.5    5.08  )-----------( 174      ( 22 Dec 2020 00:06 )             23.0     12    135  |  99  |  77<H>  ----------------------------<  172<H>  4.5   |  22  |  5.55<H>    Ca    8.4      22 Dec 2020 00:05  Phos  4.9     12-22  Mg     2.0     12    TPro  5.8<L>  /  Alb  2.9<L>  /  TBili  0.1<L>  /  DBili  x   /  AST  27  /  ALT  28  /  AlkPhos  173<H>      LIVER FUNCTIONS - ( 22 Dec 2020 00:05 )  Alb: 2.9 g/dL / Pro: 5.8 g/dL / ALK PHOS: 173 U/L / ALT: 28 U/L / AST: 27 U/L / GGT: x           PT/INR - ( 22 Dec 2020 00:05 )   PT: 13.0 sec;   INR: 1.09 ratio         PTT - ( 22 Dec 2020 00:05 )  PTT:28.0 sec  Urinalysis Basic - ( 20 Dec 2020 10:18 )    Color: Yellow / Appearance: Slightly Turbid / S.010 / pH: x  Gluc: x / Ketone: Negative  / Bili: Negative / Urobili: Negative   Blood: x / Protein: 100 / Nitrite: Negative   Leuk Esterase: Large / RBC: 9 /hpf /  /HPF   Sq Epi: x / Non Sq Epi: 0 /hpf / Bacteria: Few      Amylase Serum--      Lipase serum--       [id number]      Imaging:             Chief Complaint:  Patient is a 65y old  Male who presents with a chief complaint of transaminitis (22 Dec 2020 00:14)      Interval Events: Patient reportedly complains of loss of taste. Denies chest pain, shortness of breath, cough. No fevers or chills. No further diarrhea.    Allergies:  codeine (Anaphylaxis)      Hospital Medications:  aspirin  chewable 81 milliGRAM(s) Oral daily  chlorhexidine 2% Cloths 1 Application(s) Topical <User Schedule>  epoetin barry-epbx (RETACRIT) Injectable 14396 Unit(s) IV Push every 7 days  everolimus (ZORTRESS) 2 milliGRAM(s) Oral <User Schedule>  everolimus (ZORTRESS) 2 milliGRAM(s) Oral <User Schedule>  heparin   Injectable 5000 Unit(s) SubCutaneous every 12 hours  insulin glargine Injectable (LANTUS) 15 Unit(s) SubCutaneous at bedtime  insulin lispro (ADMELOG) corrective regimen sliding scale   SubCutaneous three times a day before meals  insulin lispro (ADMELOG) corrective regimen sliding scale   SubCutaneous at bedtime  insulin lispro Injectable (ADMELOG) 5 Unit(s) SubCutaneous three times a day before meals  magnesium oxide 400 milliGRAM(s) Oral daily  meropenem  IVPB 500 milliGRAM(s) IV Intermittent <User Schedule>  nystatin    Suspension 984400 Unit(s) Oral four times a day  ofloxacin 0.3% Solution 10 Drop(s) Right Ear two times a day  pantoprazole    Tablet 40 milliGRAM(s) Oral before breakfast  predniSONE   Tablet 5 milliGRAM(s) Oral two times a day  sodium bicarbonate 1300 milliGRAM(s) Oral three times a day  tamsulosin 0.4 milliGRAM(s) Oral at bedtime  valGANciclovir 450 milliGRAM(s) Oral <User Schedule>  vancomycin    Solution 125 milliGRAM(s) Oral every 6 hours      PMHX/PSHX:  GIB (gastrointestinal bleeding)    GERD with esophagitis    Hepatic encephalopathy    Obesity    Fatty liver disease, nonalcoholic    Renal stones    Hypertension    Neuropathy    Hypercholesteremia    Diabetes    S/P cholecystectomy    No significant past surgical history        Family history:  Family history of type 2 diabetes mellitus    Family history of hypertension    Family history of stomach cancer    No pertinent family history in first degree relatives        ROS: As per HPI, 14-point ROS negative otherwise.    PHYSICAL EXAM:     Vital Signs:  Vital Signs Last 24 Hrs  T(C): 37.2 (22 Dec 2020 07:00), Max: 37.3 (21 Dec 2020 19:00)  T(F): 99 (22 Dec 2020 07:00), Max: 99.1 (21 Dec 2020 19:00)  HR: 78 (22 Dec 2020 08:00) (74 - 87)  BP: 169/77 (22 Dec 2020 08:00) (139/70 - 174/77)  BP(mean): 110 (22 Dec 2020 08:00) (96 - 132)  RR: 14 (22 Dec 2020 08:00) (8 - 31)  SpO2: 98% (22 Dec 2020 08:00) (89% - 100%)  Daily     Daily Weight in k.2 (22 Dec 2020 01:50)    GENERAL:  appears comfortable, no acute distress  HEENT:  NC/AT,  conjunctivae clear, sclera -anicteric  CHEST:  no increased effort  HEART:  Regular rate and rhythm  ABDOMEN:  Soft, non-tender, non-distended,  no masses ,no hepato-splenomegaly,   EXTREMITIES:  no cyanosis, clubbing or edema  SKIN:  No rash/erythema/ecchymoses/petechiae/wounds  NEURO:  Alert, orientedx3, no asterixis    LABS:                        7.5    5.08  )-----------( 174      ( 22 Dec 2020 00:06 )             23.0         135  |  99  |  77<H>  ----------------------------<  172<H>  4.5   |  22  |  5.55<H>    Ca    8.4      22 Dec 2020 00:05  Phos  4.9       Mg     2.0         TPro  5.8<L>  /  Alb  2.9<L>  /  TBili  0.1<L>  /  DBili  x   /  AST  27  /  ALT  28  /  AlkPhos  173<H>      LIVER FUNCTIONS - ( 22 Dec 2020 00:05 )  Alb: 2.9 g/dL / Pro: 5.8 g/dL / ALK PHOS: 173 U/L / ALT: 28 U/L / AST: 27 U/L / GGT: x           PT/INR - ( 22 Dec 2020 00:05 )   PT: 13.0 sec;   INR: 1.09 ratio         PTT - ( 22 Dec 2020 00:05 )  PTT:28.0 sec  Urinalysis Basic - ( 20 Dec 2020 10:18 )    Color: Yellow / Appearance: Slightly Turbid / S.010 / pH: x  Gluc: x / Ketone: Negative  / Bili: Negative / Urobili: Negative   Blood: x / Protein: 100 / Nitrite: Negative   Leuk Esterase: Large / RBC: 9 /hpf /  /HPF   Sq Epi: x / Non Sq Epi: 0 /hpf / Bacteria: Few      Amylase Serum--      Lipase serum--       [id number]      Imaging:

## 2020-12-22 NOTE — PROGRESS NOTE ADULT - ASSESSMENT
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 Recent admission from 11/20-12/2 for OM of the R heel, s/p debridement with Podiatry, and was discharged with Cefepime x 6 weeks via PICC line (placed 12/1). Sent to ED from clinic with rising transaminitis and MISA (SCr 3.3 from 1.5). Hospital course complicated by C diff colitis. MISA 2/2 recent cefepime use (for OM) vs dehydration 2/2 c diff. SICU consulted in the setting of elevated creatinine to 6.6 and increased lethargy likely in the setting of uremic encephalopathy. Transferred to SICU for hemodynamic and mental status monitoring with plan for possible HD catheter placement and initiation of HD. Currently no emergent indications for HD.    PLAN:  Neuro: Previous uremic encephalopathy now at baseline  - Monitor mental status  - If pain develops, avoid narcotics due to AMS    Resp: no acute issues  - Monitor pulse oximeter, saturating well on room air  - PT  - Out of bed to chair, ambulate as tolerated    CV: HFpEF with mild LV diastolic dysfunction  - Monitor vital signs, currently stable    GI: duodenal ulcer, GAVE, duodenal AVM s/p APC (last on 10/11/19), decompensated JEAN/Cirrhosis s/p OLT   - Consistent carb diet  - Home protonix for duondenal ulcer     Renal: MISA (baseline Cr 1.5) - AIN vs ATN, s/p R MEÑO vick and HD initiation 12/19  - d/c'd bicarb gtt after HD initiated  - transplant nephro following  - HD 12/21 via BERNICE vick   - Post Void residual 8ml    ID: immunosuppression in setting of recent OLT, C diff colitis, abnormal UA  - Meropenem for abnormal UA in setting of nephrolithiasis  - PO vancomycin for C diff  - Valcyte and nystatin for ppx in setting of immunosuppression  - Ofloxacin ear drops for Right otitis externa per ENT (12/18-12/25 )    Endo: hx DM, HLD  - insulin 8u premeal + 15u qhs, ISS  - HgbA1C 8.6%     Heme: chronic anemia, MGUS  - daily labs  - HSq for VTE prophylaxis  - Home ASA  - Immunosuppression with everolimus and prednisone  - f/u everolimus level before 8AM dose    Disposition: CONTACT PRECAUTIONS (C. diff)  - Full code  - SICU    - PT-> Home PT

## 2020-12-22 NOTE — PROGRESS NOTE ADULT - SUBJECTIVE AND OBJECTIVE BOX
Transplant Surgery - Multidisciplinary Rounds  --------------------------------------------------------------  s/p OLT 7/2/2020      Admitted 12/14/2020 with transaminitis, MISA, diarrhea    Present:  Patient seen and examined during AM rounds with multidisciplinary team including Transplant Surgeon: Dr. Chris/[facility], Transplant Nephrologist: Dr. FAY Patel, Inpatient Providers:  Patrick/Oswald, unit RN. Disciplines not in attendance will be notified of the plan.      HPI: 65M with PMHx of IDDM, HLD, obesity, HFpEF with mild LV diastolic dysfunction, MGUS, chronic anemia with a history of duodenal ulcer 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  Underwent OLT on 7/2/2020, post op course c/b VRE bacteremia tx with linezolid and delirium likely in setting of CNI toxicity (FK discontinued/Everolimus initiated 7/27).    Recent admission from 11/20-12/2 for OM of the R heel, s/p debridement with Podiatry, and was discharged with Cefepime x 6 weeks via PICC line (placed 12/1). Initial wound cx on admission grew Pseudomonas, OR cx grew staph epi. Was also found with neutropenia and Valcyte was reduced (was on 900mg daily CMV +/-).      Sent to ED from clinic with rising transaminitis and MISA (SCr 3.3 from 1.5). Hospital course c/b C diff colitis and worsening renal function now requiring SICU care for uremic encephalopathy and fevers.    Interval Events:  - Afebrile over 24 hours. on Lachelle/Vanco PO/Ofloxacin.   - diarrhea improving  - now AAOx3  - continues with non-oliguric renal function, Cr now 5.55. from 5.45, s/p HD 12/19 with no fluid removal  - graft: transaminitis improving    Potential Discharge date: pending clinical improvement     Education:  Medications    Plan of care:  See Below         MEDICATIONS  (STANDING):  aspirin  chewable 81 milliGRAM(s) Oral daily  chlorhexidine 2% Cloths 1 Application(s) Topical <User Schedule>  epoetin barry-epbx (RETACRIT) Injectable 53612 Unit(s) IV Push every 7 days  everolimus (ZORTRESS) 2 milliGRAM(s) Oral <User Schedule>  everolimus (ZORTRESS) 2 milliGRAM(s) Oral <User Schedule>  heparin   Injectable 5000 Unit(s) SubCutaneous every 12 hours  insulin glargine Injectable (LANTUS) 15 Unit(s) SubCutaneous at bedtime  insulin lispro (ADMELOG) corrective regimen sliding scale   SubCutaneous three times a day before meals  insulin lispro (ADMELOG) corrective regimen sliding scale   SubCutaneous at bedtime  insulin lispro Injectable (ADMELOG) 5 Unit(s) SubCutaneous three times a day before meals  magnesium oxide 400 milliGRAM(s) Oral daily  meropenem  IVPB 500 milliGRAM(s) IV Intermittent <User Schedule>  nystatin    Suspension 743399 Unit(s) Oral four times a day  ofloxacin 0.3% Solution 10 Drop(s) Right Ear two times a day  pantoprazole    Tablet 40 milliGRAM(s) Oral before breakfast  predniSONE   Tablet 5 milliGRAM(s) Oral two times a day  sodium bicarbonate 1300 milliGRAM(s) Oral three times a day  tamsulosin 0.4 milliGRAM(s) Oral at bedtime  valGANciclovir 450 milliGRAM(s) Oral <User Schedule>  vancomycin    Solution 125 milliGRAM(s) Oral every 6 hours    MEDICATIONS  (PRN):      PAST MEDICAL & SURGICAL HISTORY:  GIB (gastrointestinal bleeding)    GERD with esophagitis  Gastritis &amp; Non Bleeding Ulcers    Hepatic encephalopathy    Obesity    Fatty liver disease, nonalcoholic    Renal stones  25 years ago    Hypertension    Neuropathy    Hypercholesteremia    Diabetes    S/P cholecystectomy        Vital Signs Last 24 Hrs  T(C): 37.2 (22 Dec 2020 07:00), Max: 37.3 (21 Dec 2020 19:00)  T(F): 99 (22 Dec 2020 07:00), Max: 99.1 (21 Dec 2020 19:00)  HR: 83 (22 Dec 2020 09:00) (74 - 87)  BP: 143/72 (22 Dec 2020 09:00) (139/70 - 174/77)  BP(mean): 101 (22 Dec 2020 09:00) (96 - 132)  RR: 10 (22 Dec 2020 09:00) (8 - 31)  SpO2: 98% (22 Dec 2020 09:00) (89% - 100%)    I&O's Summary    21 Dec 2020 07:01  -  22 Dec 2020 07:00  --------------------------------------------------------  IN: 1000 mL / OUT: 1225 mL / NET: -225 mL    22 Dec 2020 07:01  -  22 Dec 2020 09:56  --------------------------------------------------------  IN: 150 mL / OUT: 400 mL / NET: -250 mL                              7.5    5.08  )-----------( 174      ( 22 Dec 2020 00:06 )             23.0     12-22    135  |  99  |  77<H>  ----------------------------<  172<H>  4.5   |  22  |  5.55<H>    Ca    8.4      22 Dec 2020 00:05  Phos  4.9     12-22  Mg     2.0     12-22    TPro  5.8<L>  /  Alb  2.9<L>  /  TBili  0.1<L>  /  DBili  x   /  AST  27  /  ALT  28  /  AlkPhos  173<H>  12-22          Culture - Blood (collected 12-20-20 @ 11:52)  Source: .Blood Blood-Peripheral  Preliminary Report (12-21-20 @ 12:02):    No growth to date.    Culture - Blood (collected 12-20-20 @ 11:52)  Source: .Blood Blood-Peripheral  Preliminary Report (12-21-20 @ 12:02):    No growth to date.    Culture - Urine (collected 12-17-20 @ 20:08)  Source: .Urine Catheterized  Final Report (12-18-20 @ 15:59):    No growth    Culture - Catheter (collected 12-15-20 @ 16:43)  Source: .Catheter PICC line tip  Final Report (12-17-20 @ 18:03):    < 15 colonies Coag Negative Staphylococcus    Culture - Urine (collected 12-15-20 @ 16:28)  Source: .Urine Clean Catch (Midstream)  Final Report (12-16-20 @ 12:12):    <10,000 CFU/mL Normal Urogenital Shantel                Review of systems  Gen: febrile  Skin:  R heel wound   Head/Eyes/Ears/Mouth: No headache; Normal hearing; Normal vision w/o blurriness; No sinus pain/discomfort, sore throat  Respiratory: No dyspnea, cough, wheezing, hemoptysis  CV: No chest pain, PND, orthopnea  GI:  denies abdominal pain, diarrhea, constipation, nausea, vomiting, melena, hematochezia  : No increased frequency, dysuria, hematuria, nocturia  MSK: No joint pain/swelling; no back pain; no edema  Neuro: No dizziness/lightheadedness, weakness, seizures, numbness, tingling  Heme: No easy bruising or bleeding  Endo: No heat/cold intolerance  Psych: No significant nervousness, anxiety, stress, depression  All other systems were reviewed and are negative, except as noted.      PHYSICAL EXAM:  Constitutional: Well developed / well nourished  Eyes: Anicteric, PERRLA  ENMT: nc/at  Respiratory: CTA B/L  Cardiovascular: RRR, + heart murmur  Gastrointestinal: Soft, non distended, non tender, incision well healed  Genitourinary: Voiding spontaneously  Extremities: SCD's in place and working bilaterally. no edema  Vascular: Palpable dp pulses bilaterally  Neurological: A&O x3  Skin: R heel ulcer: no drainage, no cellulitis   Musculoskeletal: Moving all extremities  Psychiatric: Responsive

## 2020-12-22 NOTE — PROGRESS NOTE ADULT - ATTENDING COMMENTS
Seen and assessed on 12/22.      On HD for AIN vs ATN.   No clear infection.   On abx.  Appreciate ID input.   Immuno: everolimus (level ~8), pred

## 2020-12-22 NOTE — PROGRESS NOTE ADULT - ASSESSMENT
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, CNI Neurotoxicity), IDDM, hyperlipidemia, obesity, HFpEF with mild LV diastolic dysfunction, MGUS who presented with fevers, diarrhea and abnormal labwork    Of note, recent admission for R Heel Osteomyelitis   s/p right foot heel incision and drainage w/ application of stravix and bone biopsy on 11/24  Superficial cultures with Pseudomonas and Operative Cultures with Coag Neg Staph in fluid media  Discharged on IV Cefepime with plan for 6 week course    On 12/14 labwork was noted to have MISA and Transaminitis  Of note [facility] outpatient labs from 12/9 with Cr of 1.41 and LFT's WNL    U/A (12/15, 12/17, 12/20), with pyuria  UCx (12/15, 12/17) with NGTD  COVID19 PCR (12/14) Negative  RVP / COVID19 PCR (12/17, 12/20) Negative  C diff toxin assay (12/15) indeterminate with Positive C diff PCR  CXR (12/15, 12/21) without pulmonary infiltrates  CT A/P (12/15) with distal left ureteral calculus without hydronephrosis and Diffuse wall thickening, slightly asymmetric on the left but without prostate abscess.   Renal US (12/16) with left collecting system fullness but no hydronephrosis    Curiously patient's diarrhea resolved in under 24 hours of PO Vancomycin therapy which does raise question if the PCR picked up colonization as opposed to true infection. We will clearly have to cover with full course for C diff.    He was also noted to have pyuria on his U/A, with bladder wall thickening and some left ureteral prominence with nonobstructing calculus. 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 Now with no growth to date on his urine cultures.     The patient's heel appears without cellulitis or drainage so I doubt that this is the source of his fever.     With regards to his MISA etiology is also unclear - could be from AIN from Cefepime or alternate agent. Urine eosinophils were positive. He also had could have ATN from diarrhea and intravascular volume depletion at home but no significant hypotension here so this does bring ATN into question. Reasonable to avoid Cefepime at this time and cover with Meropenem (which would cover his previous ESBL isolates and the Pseudomonas from his osteomyelitis)    With regards to his persistent fever: unclear etiology at this point. He does not appear toxic and culture data is unrevealing. Possible it is noninfectious. He did have transaminitis on presentation and now only with alk phos elevation. Initially question of rejection was raised but with normalization of LFT's this appears less likely. Possible the AIN (if this is the cause of the MISA) is contributing to his fevers. If persistent fevers would pursue CT C/A/P and consider repeat MRI R Heel to rule out fluid collection underneath the fibrotic based wound.    He does note dysgeusia for the past 2 days and possible anosmia but no respiratory symptoms. 1 COVID19 PCR and 2 RVP were negative. Can check COVID19 Antibody. If persistent fevers reasonable to repeat COVID19 PCR in 48-72H.    Overall, C diff infection, Fever, Transaminitis, MISA, Abnormal Urinalysis, Nephrolithiasis, Heel Osteomyelitis, Liver Transplant Recipient    # Acute kidney injury / Abnormal Urinalysis / Nephrolithiasis   - Continue Meropenem 500 mg IV q24 hours   - Management of MISA per transplant nephrology    # Fever / Transaminitis-     improving temperature, he is feeling better    no new positive cultures  12/20 blood cx negative    # C. diff Infection / Diarrhea  -  diarrhea resolved  - Continue oral vancomycin 125 mg PO q6 hours x 14 days     # RLE Heel OM  - Continue meropenem to 500 mg IV q24 hours (will need to increase to 500 mg IV Q12H if renal recovery)    #Liver Transplant Recipient  - Continue Valcyte Twice weekly (Increase to TIW if renal recovery)  - Bactrim on hold due to MISA - can consider adding Mepron    # CMV  send CMV viral load  continue oral valganciclovir    Saeed Menezes MD  [phone]  After 5pm/weekends [phone]

## 2020-12-22 NOTE — PROGRESS NOTE ADULT - SUBJECTIVE AND OBJECTIVE BOX
[facility] DIVISION OF KIDNEY DISEASES AND HYPERTENSION -- FOLLOW UP NOTE  --------------------------------------------------------------------------------  Chief Complaint:    24 hour events/subjective:  Patient was seen and examined at bedside  Was unable to dialyze yesterday as there was bleeding around the HD catheter     PAST HISTORY  --------------------------------------------------------------------------------  No significant changes to PMH, PSH, FHx, SHx, unless otherwise noted    ALLERGIES & MEDICATIONS  --------------------------------------------------------------------------------  Allergies    codeine (Anaphylaxis)    Intolerances      Standing Inpatient Medications  aspirin  chewable 81 milliGRAM(s) Oral daily  chlorhexidine 2% Cloths 1 Application(s) Topical <User Schedule>  epoetin barry-epbx (RETACRIT) Injectable 92890 Unit(s) IV Push every 7 days  everolimus (ZORTRESS) 2 milliGRAM(s) Oral <User Schedule>  everolimus (ZORTRESS) 2 milliGRAM(s) Oral <User Schedule>  heparin   Injectable 5000 Unit(s) SubCutaneous every 12 hours  insulin glargine Injectable (LANTUS) 15 Unit(s) SubCutaneous at bedtime  insulin lispro (ADMELOG) corrective regimen sliding scale   SubCutaneous three times a day before meals  insulin lispro (ADMELOG) corrective regimen sliding scale   SubCutaneous at bedtime  insulin lispro Injectable (ADMELOG) 5 Unit(s) SubCutaneous three times a day before meals  magnesium oxide 400 milliGRAM(s) Oral daily  meropenem  IVPB 500 milliGRAM(s) IV Intermittent <User Schedule>  nystatin    Suspension 867826 Unit(s) Oral four times a day  ofloxacin 0.3% Solution 10 Drop(s) Right Ear two times a day  pantoprazole    Tablet 40 milliGRAM(s) Oral before breakfast  predniSONE   Tablet 5 milliGRAM(s) Oral two times a day  sodium bicarbonate 1300 milliGRAM(s) Oral three times a day  tamsulosin 0.4 milliGRAM(s) Oral at bedtime  valGANciclovir 450 milliGRAM(s) Oral <User Schedule>  vancomycin    Solution 125 milliGRAM(s) Oral every 6 hours    PRN Inpatient Medications      REVIEW OF SYSTEMS  --------------------------------------------------------------------------------  Gen: No fatigue, fevers/chills, weakness  Skin: No rashes  Head/Eyes/Ears/Mouth: No headache;No sore throat  Respiratory: No dyspnea, cough,   CV: No chest pain, PND, orthopnea  GI: No abdominal pain, diarrhea, constipation, nausea, vomiting  Transplant: No pain  : No increased frequency, dysuria, hematuria, nocturia  MSK: No joint pain/swelling; no back pain; no edema  Neuro: No dizziness/lightheadedness, weakness, seizures, numbness, tingling  Psych: No significant nervousness, anxiety, stress, depression    All other systems were reviewed and are negative, except as noted.    VITALS/PHYSICAL EXAM  --------------------------------------------------------------------------------  T(C): 37.4 (12-22-20 @ 12:10), Max: 37.4 (12-22-20 @ 11:00)  HR: 86 (12-22-20 @ 13:00) (74 - 87)  BP: 152/72 (12-22-20 @ 13:00) (135/66 - 169/77)  RR: 28 (12-22-20 @ 13:00) (8 - 91)  SpO2: 97% (12-22-20 @ 13:00) (89% - 100%)  Wt(kg): --        12-21-20 @ 07:01  -  12-22-20 @ 07:00  --------------------------------------------------------  IN: 1000 mL / OUT: 1225 mL / NET: -225 mL    12-22-20 @ 07:01  -  12-22-20 @ 13:50  --------------------------------------------------------  IN: 450 mL / OUT: 600 mL / NET: -150 mL      Physical Exam:  	Gen: NAD  	HEENT: PERRL, supple neck, clear oropharynx  	Pulm: CTA B/L  	CV: RRR, S1S2; no rub  	Back: No spinal or CVA tenderness; no sacral edema  	Abd: +BS, soft, nontender/nondistended                      Transplant: No tenderness, swelling  	: No suprapubic tenderness  	UE: Warm, FROM; no edema; no asterixis  	LE: Warm, FROM; no edema  	Neuro: No focal deficits  	Psych: Normal affect and mood  	Skin: Warm, without rashes      LABS/STUDIES  --------------------------------------------------------------------------------              7.5    5.08  >-----------<  174      [12-22-20 @ 00:06]              23.0     135  |  99  |  77  ----------------------------<  172      [12-22-20 @ 00:05]  4.5   |  22  |  5.55        Ca     8.4     [12-22-20 @ 00:05]      Mg     2.0     [12-22-20 @ 00:05]      Phos  4.9     [12-22-20 @ 00:05]    TPro  5.8  /  Alb  2.9  /  TBili  0.1  /  DBili  x   /  AST  27  /  ALT  28  /  AlkPhos  173  [12-22-20 @ 00:05]    PT/INR: PT 13.0 , INR 1.09       [12-22-20 @ 00:05]  PTT: 28.0       [12-22-20 @ 00:05]      Creatinine Trend:  SCr 5.55 [12-22 @ 00:05]  SCr 5.45 [12-20 @ 21:44]  SCr 5.62 [12-20 @ 06:05]  SCr 6.94 [12-19 @ 05:29]  SCr 6.63 [12-18 @ 16:14]        Urinalysis - [12-20-20 @ 10:18]      Color Yellow / Appearance Slightly Turbid / SG 1.010 / pH 6.0      Gluc 500 mg/dL / Ketone Negative  / Bili Negative / Urobili Negative       Blood Moderate / Protein 100 / Leuk Est Large / Nitrite Negative      RBC 9 /  / Hyaline 0 / Gran  / Sq Epi  / Non Sq Epi 0 / Bacteria Few    Urine Protein 126      [12-17-20 @ 14:14]  Urine Urea Nitrogen 367      [12-15-20 @ 15:35]    Iron 160, TIBC Unable to calculate Test Repeated, %sat Unable to calculate Test Repeated      [06-12-20 @ 09:04]  Ferritin 493      [04-29-20 @ 08:20]  HbA1c 6.4      [02-12-20 @ 17:08]  TSH 3.26      [04-26-20 @ 09:47]  Lipid: chol 158, , HDL 44, LDL 93      [08-05-20 @ 08:57]    HBsAg Nonreact      [12-19-20 @ 14:40]  HCV 0.06, Nonreact      [12-19-20 @ 14:40]    C3 Complement 158      [12-17-20 @ 16:43]  C4 Complement 38      [12-17-20 @ 16:43]

## 2020-12-22 NOTE — CHART NOTE - NSCHARTNOTEFT_GEN_A_CORE
Nutrition Follow Up Note     Patient seen for: nutrition follow up on 8ICU    Source: patient, medical record, communication with team.     Chart reviewed, events noted. "65y Male with PMHx of IDDM, HLD, obesity, HFpEF with mild LV diastolic dysfunction, MGUS, chronic anemia with a history of duodenal ulcer as well as GAVE and duodenal AVM s/p APC (last on 10/11/19), decompensated JEAN/Cirrhosis 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. Recent admission from 11/20-12/2 for OM of the R heel, s/p debridement with Podiatry, and was discharged with Cefepime x 6 weeks via PICC line (placed 12/1). Sent to ED from clinic with rising transaminitis and MISA (SCr 3.3 from 1.5). Hospital course complicated by C diff colitis. MISA 2/2 recent cefepime use (for OM) vs dehydration 2/2 c diff. SICU consulted in the setting of elevated creatinine to 6.6 and increased lethargy likely in the setting of uremic encephalopathy. Transferred to SICU for hemodynamic and mental status monitoring with plan for possible HD catheter placement and initiation of HD."    Diet Order: Diet, Renal Restrictions:   For patients receiving Renal Replacement - No Protein Restr, No Conc K, No Conc Phos, Low Sodium  Consistent Carbohydrate {No Snacks} (CSTCHO) (12-22-20 @ 12:10)    Nutrition Events:   - PO Intake: Pt with multiple  complaints, requests diet liberalization; communicated with team. Pt is refusing most items on his meal tray that are compliant with a Consistent Carbohydrate, Renal diet; pt only wants macaroni and cheese, and a cheeseburger with fries and soda.   - Pt refuses Nepro supplement.  - Hyperglycemia addressed with Lantus (15 units bedtime), Admelog (5 units tid before meals) and SSI  - Nutrition Education: Discussed post-transplant hyperglycemia and noted increase in HbA1c from 5.2% (July 2020) to 8.6% (December 2020). Pt  reports typical fingersticks of 125-135mg/dL at home, expressed unwillingness to adhere to Consistent Carbohydrate diet restrictions in-house.   - Pt noted with MISA requiring intermittent HD; last HD 12/21, 12/22.    Last BM 12/22. Bowel regimen. C diff positive 12/15/20    Urine output x 24-hours: 1225 ml    Anthropometric Measurements:   Height (cm): 185.4 (12-14-20 @ 17:15), 185.4 (11-24-20 @ 16:06)  Weight (kg): 113.4 (12-14-20 @ 17:15), 111.1 (11-24-20 @ 16:06)  Daily Weight (kg):  107 (12-22-20)  BMI (kg/m2): 33 (12-14-20 @ 17:15), 32.3 (11-24-20 @ 16:06)  IBW: 83.4 kg    Medications: MEDICATIONS  (STANDING):  aspirin  chewable 81 milliGRAM(s) Oral daily  chlorhexidine 2% Cloths 1 Application(s) Topical <User Schedule>  epoetin barry-epbx (RETACRIT) Injectable 78228 Unit(s) IV Push every 7 days  everolimus (ZORTRESS) 2 milliGRAM(s) Oral <User Schedule>  everolimus (ZORTRESS) 2 milliGRAM(s) Oral <User Schedule>  heparin   Injectable 5000 Unit(s) SubCutaneous every 12 hours  insulin glargine Injectable (LANTUS) 15 Unit(s) SubCutaneous at bedtime  insulin lispro (ADMELOG) corrective regimen sliding scale   SubCutaneous three times a day before meals  insulin lispro (ADMELOG) corrective regimen sliding scale   SubCutaneous at bedtime  insulin lispro Injectable (ADMELOG) 5 Unit(s) SubCutaneous three times a day before meals  magnesium oxide 400 milliGRAM(s) Oral daily  meropenem  IVPB 500 milliGRAM(s) IV Intermittent <User Schedule>  nystatin    Suspension 303884 Unit(s) Oral four times a day  ofloxacin 0.3% Solution 10 Drop(s) Right Ear two times a day  pantoprazole    Tablet 40 milliGRAM(s) Oral before breakfast  predniSONE   Tablet 5 milliGRAM(s) Oral two times a day  sodium bicarbonate 1300 milliGRAM(s) Oral three times a day  tamsulosin 0.4 milliGRAM(s) Oral at bedtime  valGANciclovir 450 milliGRAM(s) Oral <User Schedule>  vancomycin    Solution 125 milliGRAM(s) Oral every 6 hours    Labs: 12-22 @ 00:06: Hemoglobin 7.5<L>, Hematocrit 23.0<L>  12-22 @ 00:05: Sodium 135, Potassium 4.5, Calcium 8.4, Magnesium 2.0, Phosphorus 4.9<H>, BUN 77<H>, Creatinine 5.55<H>, Glucose 172<H>, Alk Phos 173<H>, ALT/SGPT 28, AST/SGOT 27, Albumin 2.9<L>, Total Bilirubin 0.1<L>    HbA1c 8.6% (Dec 2020), 5.2% (July 2020)    POCT Blood Glucose.: 151 mg/dL (12-22-20 @ 12:01)  POCT Blood Glucose.: 167 mg/dL (12-22-20 @ 08:03)  POCT Blood Glucose.: 151 mg/dL (12-21-20 @ 23:54)  POCT Blood Glucose.: 205 mg/dL (12-21-20 @ 17:09)    Skin per nursing documentation: no pressure injuries documented  Edema: 2+ right ankle, foot    Estimated Needs: based on IBW 83.4kg  Energy: 9641-1076 (25-30 fahad/kg)  Protein: 100-116 grams (1.2-1.4 gm/kg)    Previous Nutrition Diagnosis: 1) Overweight/Obesity 2) Altered Nutrition Related Lab Values  Nutrition Diagnosis is: ongoing/improving with therapeutic diet, intermittent HD, and medical management    New Nutrition Diagnosis: none    Recommended Interventions:   1. Liberalize diet to Consistent Carbohydrate diet with snack. Remove Renal restriction as medically feasible,   2. Discontinue Nepro supplement - pt refuses.  3. Food preferences taken to improve po intake  4. RD will reinforce therapeutic diet education when pt is receptive; currently refuses.      Monitoring and Evaluation:   Continue to monitor nutrition provision and tolerance, weights, labs, skin integrity.   RD remains available upon request and will follow up per protocol.    Myah Argueta, MS RD CDN [facility]; Pager # [phone]

## 2020-12-22 NOTE — PROGRESS NOTE ADULT - ATTENDING COMMENTS
Mr. 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    # Fevers: Afebrile for >24h, therefore will defer repeating imaging for now. Continuing meropenem.    # MISA: S/p HD 12/19 and planned for HD again today (technically unsuccessful yesterday due to catheter-associated bleeding). Urinalysis with WBCs and eosinophils, concerning for AIN, likely triggered by cefepime (last received on 12/14). If renal function is not improving, then may need renal biopsy for definitive diagnosis prior to consideration of course of high-dose steroids (especially as the latter poses risks given his immunosuppression, recent C diff colitis, hyperglycemia, and history of delirium), but Transplant Surgery prefers to defer biopsy for now.    # C diff colitis: Symptomatically improved. Continuing oral vancomycin (12/15- ).    # R calcaneous osteomyelitis: No cellulitis on exam. Switched from cefepime to meropenem (12/15- ) due to concern for AIN, as above.    # Post-DDLT: Liver enzymes improved since admission with prednisone increased to 5 mg po bid. MMF held. Continuing everolimus 2 mg po q12h and will adjust dose by trough levels. Remains on Valcyte for CMV prophylaxis and CMV PCR was negative (12/16). Bactrim held due to MISA.    # IDDM2: Insulin adjustments as per primary team for hyperglycemia.    Please don't hesitate to call with any questions or concerns.    Letitia Mo M.D., Ph.D.  Transplant Hepatology  Cell: [phone]

## 2020-12-22 NOTE — PROGRESS NOTE ADULT - ASSESSMENT
65 yr old man with liver transplant in July 2020 on everolimus and steroids, cellcept on hold due to infection. Right plantar osteo s/p debridement , was on IV cefepime for ~2 weeks s/p graft, appears clean and no signs of infection. Admitted with MISA. Serum creatinine was 1.3-1.5mg/dL on previous admission few weeks ago.     1. Non Oliguric MISA likely due to AIN from cefepime vs ATN in the setting of sepsis- U/A with leukocytes on admission but cultures remain negative.   Non obstructive left distal ureteric stone on CT. U/S with enlarged kidneys, no olga hydronephrosis.   Plan -HD today with no UF . will give DDAVP prior to dialysis.     2. Fever- C diff infection, likely AIN, Heel Osteomyelitis- repeat BC and fungitell. Appreciate ID recs. Currently on PO Vancomycin and Meropeneam.  3. Anemia - continue retacrit 10,000 units sq weekly   4. Metabolic acidosis - Continue sodium bicarb 1300mg po TID for  5.  Liver Transplant Recipient- managed by hepatology

## 2020-12-22 NOTE — PROGRESS NOTE ADULT - ASSESSMENT
65M with PMHx of IDDM, HLD, obesity, HFpEF with mild LV diastolic dysfunction, MGUS, chronic anemia with a history of duodenal ulcer 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  Underwent OLT on 7/2/2020, post op coure c/b VRE bacteremia tx with linezolid and delirium likely in setting of CNI toxicity (FK discontinued/Everolimus initiated 7/27).    Recent admission from 11/20-12/2 for OM of the R heel, s/p debridement with Podiatry, and was discharged with Cefepime x 6 weeks via PICC line (placed 12/1).     Initial wound cx on admission grew Pseudomonas, OR cx grew staph epi.  He now presents with from clinic rising transaminitis and MISA (SCr 3.3 from 1.5)    Hospital course c/b C. Diff colitis and worsening renal function, requiring SICU care    [] ID:   - C. Diff colitis: continue Vanco PO; diarrhea improving. will hold off on Flagyl   - R heel OM: s/p debridement by podiatry last admission; wound healing, no signs on infection.   - F/U cultures from 12/20.  cultures negative so far.  continue Lachelle given h/o ESBL E.Coli. Fungitell, repeat BCx pending  - CMV +/- :  Valcyte 450mg BIW (renally adjusted), CMV PCR neg from 12/15. will send repeat weekly, next due 12/22  - R ear otitis externa with chronic hearing loss: Ofloxacin per ENT  - TTE w/o obvious evidence of endocarditis     [] non-oliguric MISA   - Worsening renal function with associated uremic encephelopathy, improving today  - Shiley placed 12/19, s/p HD 12/19.  DDAVP x1 due to shiley site oozing, HD today with no fluid removal today   - Anemia:  Procrit 10K sq weekly    [] s/p OLT with transaminitis  - Everolimus 2/2, MMF on hold since last admission in setting of OM; Pred 5/5  - PPx: Valcyte renally dosed, off Bactrim  - Liver doppler with patent vessels   - LFTs trending down    [] DM  - diabetic diet  - tight glycemic control via Lantus/Lispro    [] Dispo:   - appreciate SICU care

## 2020-12-22 NOTE — PROGRESS NOTE ADULT - ATTENDING COMMENTS
Pt seen and examined with resident and PA team on 12/22, agree with above.     1. C. diff colitis during treatment with cefepime for R heel osteo:  - Cefepime was discontinued  - On po vanc with improvement in diarrhea  - Diet    2. R heel osteo, UTI, continuing fevers (now afebrile for 24 hours)  - Superficial wound swab in November in ED grew Pseudomonas, likely contam; calcaneal bone culture with S. epi  - Was on cefepime, now on meropenem  - On po vanc for C. diff  - On ofloxacin gtt for otitis externa    3. MISA stage 3, ?AIN (urine eosinophils) related to cefepime? on CKD 3 (baseline Cr 1.2-1.5)  - Off cefepime currently  - Avoid nephrotoxic meds  - Received HD on Saturday. Yesterday, there was some bleeding from Shiley catheter so HD was aborted. CXR confirmed position of line, and pt tolerated HD well.  - On bicarb po for metabolic acidosis  - Med doses adjusted for eGFR    4. Transaminitis s/p OLT in July 2020:  - Resolved, although AP still elevated (improving)  - Continue immunosuppression per Tx recs, check everolimus level, continue prednisone  - F/u CMV titer per Tx recs    5. Poor po intake:  - Resolving, pt ate very well for breakfast    6. DM type 2 on long-term insulin  - HbA1c 8.6%  - Home meds: Lantus 10 units qhs, Humalog 10 units premeal  - Currently on Lantus 15 units qhs, Admelog 5 units premeal  - Will need to discuss whether pt was not compliant with insulin regimen at home and explore why, or whether pt was compliant and has not been on adequate insulin coverage given HbA1c result (8.6%).    7. HFpEF (mild diastolic dysfunction):  - Home meds: Lasix 20mg daily  - Holding lasix currently given MISA stage 3 on CKD 3    8. GAVE, DU, duodenal AVM s/p APC:  - Home meds: Protonix 40mg daily  - Continue    9. BPH:  - Home meds: tamsulosin 0.4mg daily  - Continue

## 2020-12-22 NOTE — PROGRESS NOTE ADULT - SUBJECTIVE AND OBJECTIVE BOX
INFECTIOUS DISEASES FOLLOW UP-- Briseida Menezes  [phone]    This is a follow up note for this  65yMale with  High liver transaminase level    improving clinically- transferred to the floor        ROS:  CONSTITUTIONAL:  No fever, good appetite  CARDIOVASCULAR:  No chest pain or palpitations  RESPIRATORY:  No dyspnea  GASTROINTESTINAL:  No nausea, vomiting, diarrhea, or abdominal pain  GENITOURINARY:  No dysuria  NEUROLOGIC:  No headache,     Allergies    codeine (Anaphylaxis)    Intolerances        ANTIBIOTICS/RELEVANT:  antimicrobials  meropenem  IVPB 500 milliGRAM(s) IV Intermittent <User Schedule>  nystatin    Suspension 933760 Unit(s) Oral four times a day  valGANciclovir 450 milliGRAM(s) Oral <User Schedule>  vancomycin    Solution 125 milliGRAM(s) Oral every 6 hours    immunologic:  epoetin barry-epbx (RETACRIT) Injectable 39388 Unit(s) IV Push every 7 days  everolimus (ZORTRESS) 2 milliGRAM(s) Oral <User Schedule>  everolimus (ZORTRESS) 2 milliGRAM(s) Oral <User Schedule>    OTHER:  aspirin  chewable 81 milliGRAM(s) Oral daily  chlorhexidine 2% Cloths 1 Application(s) Topical <User Schedule>  heparin   Injectable 5000 Unit(s) SubCutaneous every 12 hours  insulin glargine Injectable (LANTUS) 15 Unit(s) SubCutaneous at bedtime  insulin lispro (ADMELOG) corrective regimen sliding scale   SubCutaneous three times a day before meals  insulin lispro (ADMELOG) corrective regimen sliding scale   SubCutaneous at bedtime  insulin lispro Injectable (ADMELOG) 5 Unit(s) SubCutaneous three times a day before meals  magnesium oxide 400 milliGRAM(s) Oral daily  ofloxacin 0.3% Solution 10 Drop(s) Right Ear two times a day  pantoprazole    Tablet 40 milliGRAM(s) Oral before breakfast  predniSONE   Tablet 5 milliGRAM(s) Oral two times a day  sodium bicarbonate 1300 milliGRAM(s) Oral three times a day  tamsulosin 0.4 milliGRAM(s) Oral at bedtime      Objective:  Vital Signs Last 24 Hrs  T(C): 37.3 (22 Dec 2020 16:00), Max: 37.4 (22 Dec 2020 11:00)  T(F): 99.1 (22 Dec 2020 16:00), Max: 99.3 (22 Dec 2020 11:00)  HR: 89 (22 Dec 2020 16:00) (74 - 89)  BP: 160/74 (22 Dec 2020 16:00) (135/66 - 169/77)  BP(mean): 107 (22 Dec 2020 16:00) (94 - 132)  RR: 17 (22 Dec 2020 16:00) (8 - 91)  SpO2: 99% (22 Dec 2020 16:00) (91% - 100%)    PHYSICAL EXAM:  Constitutional:no acute distress  Eyes:ROHAN, EOMI  Ear/Nose/Throat: no oral lesions, 	  Respiratory: clear BL  Cardiovascular: S1S2  Gastrointestinal:soft, (+) BS, no tenderness OLTX scar  Extremities:no e/e/c  No Lymphadenopathy  IV sites not inflammed.    LABS:                        7.5    5.08  )-----------( 174      ( 22 Dec 2020 00:06 )             23.0     12-22    135  |  99  |  77<H>  ----------------------------<  172<H>  4.5   |  22  |  5.55<H>    Ca    8.4      22 Dec 2020 00:05  Phos  4.9     12-22  Mg     2.0     12-22    TPro  5.8<L>  /  Alb  2.9<L>  /  TBili  0.1<L>  /  DBili  x   /  AST  27  /  ALT  28  /  AlkPhos  173<H>  12-22    PT/INR - ( 22 Dec 2020 00:05 )   PT: 13.0 sec;   INR: 1.09 ratio         PTT - ( 22 Dec 2020 00:05 )  PTT:28.0 sec      MICROBIOLOGY:            RECENT CULTURES:  12-20 @ 11:52  .Blood Blood-Peripheral  --  --  --    No growth to date.  --  12-17 @ 20:08  .Urine Catheterized  --  --  --    No growth  --      RADIOLOGY & ADDITIONAL STUDIES:    < from: Xray Chest 1 View- PORTABLE-Urgent (Xray Chest 1 View- PORTABLE-Urgent .) (12.21.20 @ 15:51) >  Impression:    Theheart is enlarged. The lungs are clear. The right central line was placed and the tip is in superior vena cava. No pneumothorax. No pleural effusion. No acute bony pathology could be identified.    < end of copied text >

## 2020-12-23 ENCOUNTER — APPOINTMENT (OUTPATIENT)
Dept: INFECTIOUS DISEASE | Facility: CLINIC | Age: 65
End: 2020-12-23

## 2020-12-23 LAB
ALBUMIN SERPL ELPH-MCNC: 2.7 G/DL — LOW (ref 3.3–5)
ALP SERPL-CCNC: 153 U/L — HIGH (ref 40–120)
ALT FLD-CCNC: 25 U/L — SIGNIFICANT CHANGE UP (ref 10–45)
AMMONIA BLD-MCNC: 28 UMOL/L — SIGNIFICANT CHANGE UP (ref 11–55)
ANION GAP SERPL CALC-SCNC: 13 MMOL/L — SIGNIFICANT CHANGE UP (ref 5–17)
APTT BLD: 26 SEC — LOW (ref 27.5–35.5)
AST SERPL-CCNC: 24 U/L — SIGNIFICANT CHANGE UP (ref 10–40)
BILIRUB SERPL-MCNC: 0.1 MG/DL — LOW (ref 0.2–1.2)
BLD GP AB SCN SERPL QL: NEGATIVE — SIGNIFICANT CHANGE UP
BUN SERPL-MCNC: 59 MG/DL — HIGH (ref 7–23)
CALCIUM SERPL-MCNC: 8 MG/DL — LOW (ref 8.4–10.5)
CHLORIDE SERPL-SCNC: 97 MMOL/L — SIGNIFICANT CHANGE UP (ref 96–108)
CO2 SERPL-SCNC: 23 MMOL/L — SIGNIFICANT CHANGE UP (ref 22–31)
CREAT SERPL-MCNC: 4.73 MG/DL — HIGH (ref 0.5–1.3)
EVEROLIMUS, WHOLE BLOOD RESULT: 3.8 NG/ML — SIGNIFICANT CHANGE UP (ref 3–8)
GLUCOSE BLDC GLUCOMTR-MCNC: 185 MG/DL — HIGH (ref 70–99)
GLUCOSE BLDC GLUCOMTR-MCNC: 208 MG/DL — HIGH (ref 70–99)
GLUCOSE BLDC GLUCOMTR-MCNC: 224 MG/DL — HIGH (ref 70–99)
GLUCOSE BLDC GLUCOMTR-MCNC: 242 MG/DL — HIGH (ref 70–99)
GLUCOSE SERPL-MCNC: 257 MG/DL — HIGH (ref 70–99)
HCT VFR BLD CALC: 21.4 % — LOW (ref 39–50)
HGB BLD-MCNC: 6.7 G/DL — CRITICAL LOW (ref 13–17)
INR BLD: 1.11 RATIO — SIGNIFICANT CHANGE UP (ref 0.88–1.16)
MAGNESIUM SERPL-MCNC: 2 MG/DL — SIGNIFICANT CHANGE UP (ref 1.6–2.6)
MCHC RBC-ENTMCNC: 28.4 PG — SIGNIFICANT CHANGE UP (ref 27–34)
MCHC RBC-ENTMCNC: 31.3 GM/DL — LOW (ref 32–36)
MCV RBC AUTO: 90.7 FL — SIGNIFICANT CHANGE UP (ref 80–100)
NRBC # BLD: 0 /100 WBCS — SIGNIFICANT CHANGE UP (ref 0–0)
PHOSPHATE SERPL-MCNC: 5 MG/DL — HIGH (ref 2.5–4.5)
PLATELET # BLD AUTO: 151 K/UL — SIGNIFICANT CHANGE UP (ref 150–400)
POTASSIUM SERPL-MCNC: 4.3 MMOL/L — SIGNIFICANT CHANGE UP (ref 3.5–5.3)
POTASSIUM SERPL-SCNC: 4.3 MMOL/L — SIGNIFICANT CHANGE UP (ref 3.5–5.3)
PROT SERPL-MCNC: 5.4 G/DL — LOW (ref 6–8.3)
PROTHROM AB SERPL-ACNC: 13.2 SEC — SIGNIFICANT CHANGE UP (ref 10.6–13.6)
RBC # BLD: 2.36 M/UL — LOW (ref 4.2–5.8)
RBC # FLD: 13.8 % — SIGNIFICANT CHANGE UP (ref 10.3–14.5)
RH IG SCN BLD-IMP: POSITIVE — SIGNIFICANT CHANGE UP
SODIUM SERPL-SCNC: 133 MMOL/L — LOW (ref 135–145)
WBC # BLD: 3.43 K/UL — LOW (ref 3.8–10.5)
WBC # FLD AUTO: 3.43 K/UL — LOW (ref 3.8–10.5)

## 2020-12-23 PROCEDURE — 99232 SBSQ HOSP IP/OBS MODERATE 35: CPT | Mod: GC

## 2020-12-23 PROCEDURE — 99233 SBSQ HOSP IP/OBS HIGH 50: CPT

## 2020-12-23 PROCEDURE — 71250 CT THORAX DX C-: CPT | Mod: 26

## 2020-12-23 PROCEDURE — 74176 CT ABD & PELVIS W/O CONTRAST: CPT | Mod: 26

## 2020-12-23 RX ORDER — MEROPENEM 1 G/30ML
500 INJECTION INTRAVENOUS EVERY 12 HOURS
Refills: 0 | Status: COMPLETED | OUTPATIENT
Start: 2020-12-23 | End: 2020-12-30

## 2020-12-23 RX ORDER — VALGANCICLOVIR 450 MG/1
450 TABLET, FILM COATED ORAL
Refills: 0 | Status: DISCONTINUED | OUTPATIENT
Start: 2020-12-23 | End: 2020-12-30

## 2020-12-23 RX ORDER — FAMOTIDINE 10 MG/ML
40 INJECTION INTRAVENOUS DAILY
Refills: 0 | Status: DISCONTINUED | OUTPATIENT
Start: 2020-12-23 | End: 2020-12-30

## 2020-12-23 RX ADMIN — TAMSULOSIN HYDROCHLORIDE 0.4 MILLIGRAM(S): 0.4 CAPSULE ORAL at 19:39

## 2020-12-23 RX ADMIN — Medication 5 MILLIGRAM(S): at 18:03

## 2020-12-23 RX ADMIN — Medication 2: at 13:05

## 2020-12-23 RX ADMIN — MEROPENEM 100 MILLIGRAM(S): 1 INJECTION INTRAVENOUS at 18:01

## 2020-12-23 RX ADMIN — EVEROLIMUS 2 MILLIGRAM(S): 10 TABLET ORAL at 19:38

## 2020-12-23 RX ADMIN — Medication 4: at 19:37

## 2020-12-23 RX ADMIN — PANTOPRAZOLE SODIUM 40 MILLIGRAM(S): 20 TABLET, DELAYED RELEASE ORAL at 05:41

## 2020-12-23 RX ADMIN — CHLORHEXIDINE GLUCONATE 1 APPLICATION(S): 213 SOLUTION TOPICAL at 05:41

## 2020-12-23 RX ADMIN — Medication 500000 UNIT(S): at 18:02

## 2020-12-23 RX ADMIN — Medication 10 DROP(S): at 05:42

## 2020-12-23 RX ADMIN — INSULIN GLARGINE 15 UNIT(S): 100 INJECTION, SOLUTION SUBCUTANEOUS at 22:32

## 2020-12-23 RX ADMIN — Medication 5 MILLIGRAM(S): at 05:42

## 2020-12-23 RX ADMIN — HEPARIN SODIUM 5000 UNIT(S): 5000 INJECTION INTRAVENOUS; SUBCUTANEOUS at 05:41

## 2020-12-23 RX ADMIN — Medication 125 MILLIGRAM(S): at 18:02

## 2020-12-23 RX ADMIN — Medication 1300 MILLIGRAM(S): at 13:13

## 2020-12-23 RX ADMIN — Medication 500000 UNIT(S): at 22:34

## 2020-12-23 RX ADMIN — Medication 125 MILLIGRAM(S): at 22:34

## 2020-12-23 RX ADMIN — Medication 5 UNIT(S): at 13:06

## 2020-12-23 RX ADMIN — EVEROLIMUS 2 MILLIGRAM(S): 10 TABLET ORAL at 09:19

## 2020-12-23 RX ADMIN — Medication 4: at 08:37

## 2020-12-23 RX ADMIN — Medication 500000 UNIT(S): at 13:05

## 2020-12-23 RX ADMIN — Medication 500000 UNIT(S): at 05:41

## 2020-12-23 RX ADMIN — Medication 125 MILLIGRAM(S): at 05:41

## 2020-12-23 RX ADMIN — Medication 1300 MILLIGRAM(S): at 19:39

## 2020-12-23 RX ADMIN — Medication 5 UNIT(S): at 19:37

## 2020-12-23 RX ADMIN — MAGNESIUM OXIDE 400 MG ORAL TABLET 400 MILLIGRAM(S): 241.3 TABLET ORAL at 13:05

## 2020-12-23 RX ADMIN — Medication 81 MILLIGRAM(S): at 13:05

## 2020-12-23 RX ADMIN — Medication 125 MILLIGRAM(S): at 13:05

## 2020-12-23 RX ADMIN — FAMOTIDINE 40 MILLIGRAM(S): 10 INJECTION INTRAVENOUS at 18:02

## 2020-12-23 RX ADMIN — HEPARIN SODIUM 5000 UNIT(S): 5000 INJECTION INTRAVENOUS; SUBCUTANEOUS at 18:02

## 2020-12-23 RX ADMIN — Medication 10 DROP(S): at 18:03

## 2020-12-23 RX ADMIN — Medication 5 UNIT(S): at 08:37

## 2020-12-23 RX ADMIN — Medication 1300 MILLIGRAM(S): at 05:42

## 2020-12-23 NOTE — PROGRESS NOTE ADULT - ASSESSMENT
65M with PMHx of IDDM, HLD, obesity, HFpEF with mild LV diastolic dysfunction, MGUS, chronic anemia with a history of duodenal ulcer 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  Underwent OLT on 7/2/2020, post op coure c/b VRE bacteremia tx with linezolid and delirium likely in setting of CNI toxicity (FK discontinued/Everolimus initiated 7/27).    Recent admission from 11/20-12/2 for OM of the R heel, s/p debridement with Podiatry, and was discharged with Cefepime x 6 weeks via PICC line (placed 12/1).     Initial wound cx on admission grew Pseudomonas, OR cx grew staph epi.  He now presents with from clinic rising transaminitis and MISA (SCr 3.3 from 1.5)    Hospital course c/b C. Diff colitis and worsening renal function, requiring SICU care    [] ID:   - CT Abdomen/Pelvis noncontrast today to further evaluate for genitourinary etiology   - C. Diff colitis: continue Vanco PO; diarrhea resolved since yesterday. will hold off on Flagyl   - R heel OM: s/p debridement by podiatry last admission; wound healing, no signs on infection.   - F/U cultures from 12/20.  cultures negative so far.  continue Lachelle given h/o ESBL E.Coli. Fungitell, repeat BCx pending  - CMV +/- :  Valcyte 450mg BIW (renally adjusted), CMV PCR neg from 12/15. will send repeat weekly, next due 12/22  - R ear otitis externa with chronic hearing loss: Ofloxacin per ENT  - TTE w/o obvious evidence of endocarditis     [] non-oliguric MISA   - Worsening renal function with associated uremic encephelopathy, improving today, Creatinine 4.73 (from 5.55)  - Shiley placed 12/19, s/p HD 12/19.  DDAVP x1 due to shiley site oozing, HD today with no fluid removal today   - Anemia:  Procrit 10K sq weekly    [] s/p OLT with transaminitis  - Everolimus 2/2, MMF on hold since last admission in setting of OM; Pred 5/5  - PPx: Valcyte renally dosed, off Bactrim  - Liver doppler with patent vessels   - LFTs trending down    [] Heme:   -Hg 6.7 (from 7.5) today. pending transfusion of 1uprbc today  -continue EPO  -send retic count    [] DM  - diabetic diet  - tight glycemic control via Lantus/Lispro    [] Dispo:   - appreciate SICU care

## 2020-12-23 NOTE — PROVIDER CONTACT NOTE (OTHER) - ASSESSMENT
pt remains a+ox4. SBP 170s-180s, now 176/80, All other VS remain WDL. Continuous telemonitoring & pulse ox in progress. NSR on the monitor w/ O2 sat >95% at this time. Pt has no c/o of CP or SOB.
pt remains a+ox4. HR-83 BP-160/73(105), RR-16, SpO2-95% on room air
Pt is A&Ox4- VS as per flowsheet- pt denies any pain. No acute distress noted at this time.
Pt is A&Ox4- VS as per flowsheet- pt denies any pain. No acute distress noted at this time.

## 2020-12-23 NOTE — PROGRESS NOTE ADULT - SUBJECTIVE AND OBJECTIVE BOX
Transplant Surgery - Multidisciplinary Rounds  --------------------------------------------------------------  s/p OLT 7/2/2020      Admitted 12/14/2020 with transaminitis, MISA, diarrhea    Present:  Patient seen and examined during AM rounds with multidisciplinary team including Transplant Surgeon: Dr. Chris/[facility], Transplant Nephrologist: Dr. FAY Patel, Inpatient Providers:  Xavi/Oswald, surgery resident Deric, unit RN. Disciplines not in attendance will be notified of the plan.      HPI: 65M with PMHx of IDDM, HLD, obesity, HFpEF with mild LV diastolic dysfunction, MGUS, chronic anemia with a history of duodenal ulcer 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  Underwent OLT on 7/2/2020, post op course c/b VRE bacteremia tx with linezolid and delirium likely in setting of CNI toxicity (FK discontinued/Everolimus initiated 7/27).    Recent admission from 11/20-12/2 for OM of the R heel, s/p debridement with Podiatry, and was discharged with Cefepime x 6 weeks via PICC line (placed 12/1). Initial wound cx on admission grew Pseudomonas, OR cx grew staph epi. Was also found with neutropenia and Valcyte was reduced (was on 900mg daily CMV +/-).      Sent to ED from clinic with rising transaminitis and MISA (SCr 3.3 from 1.5). Hospital course c/b C diff colitis and worsening renal function now requiring SICU care for uremic encephalopathy and fevers.    Interval Events:  - Tmax 100.5 at 9pm overnight. on Lachelle/Vanco PO/Ofloxacin  - Hg 6.7 this morning from 7.5 yesterday  - diarrhea resolved since yesterday  - now AAOx3  - continues with non-oliguric renal function, s/p HD yesterday with no fluid removal. Cr now 4.73. from 5.55  - graft: transaminitis improving    Potential Discharge date: pending clinical improvement     Education:  Medications    Plan of care:  See Below         MEDICATIONS  (STANDING):  aspirin  chewable 81 milliGRAM(s) Oral daily  chlorhexidine 2% Cloths 1 Application(s) Topical <User Schedule>  epoetin barry-epbx (RETACRIT) Injectable 52937 Unit(s) IV Push every 7 days  everolimus (ZORTRESS) 2 milliGRAM(s) Oral <User Schedule>  everolimus (ZORTRESS) 2 milliGRAM(s) Oral <User Schedule>  heparin   Injectable 5000 Unit(s) SubCutaneous every 12 hours  insulin glargine Injectable (LANTUS) 15 Unit(s) SubCutaneous at bedtime  insulin lispro (ADMELOG) corrective regimen sliding scale   SubCutaneous three times a day before meals  insulin lispro (ADMELOG) corrective regimen sliding scale   SubCutaneous at bedtime  insulin lispro Injectable (ADMELOG) 5 Unit(s) SubCutaneous three times a day before meals  magnesium oxide 400 milliGRAM(s) Oral daily  meropenem  IVPB 500 milliGRAM(s) IV Intermittent <User Schedule>  nystatin    Suspension 547171 Unit(s) Oral four times a day  ofloxacin 0.3% Solution 10 Drop(s) Right Ear two times a day  pantoprazole    Tablet 40 milliGRAM(s) Oral before breakfast  predniSONE   Tablet 5 milliGRAM(s) Oral two times a day  sodium bicarbonate 1300 milliGRAM(s) Oral three times a day  tamsulosin 0.4 milliGRAM(s) Oral at bedtime  valGANciclovir 450 milliGRAM(s) Oral <User Schedule>  vancomycin    Solution 125 milliGRAM(s) Oral every 6 hours    MEDICATIONS  (PRN):      PAST MEDICAL & SURGICAL HISTORY:  GIB (gastrointestinal bleeding)    GERD with esophagitis  Gastritis &amp; Non Bleeding Ulcers    Hepatic encephalopathy    Obesity    Fatty liver disease, nonalcoholic    Renal stones  25 years ago    Hypertension    Neuropathy    Hypercholesteremia    Diabetes    S/P cholecystectomy              MEDICATIONS  (STANDING):  aspirin  chewable 81 milliGRAM(s) Oral daily  chlorhexidine 2% Cloths 1 Application(s) Topical <User Schedule>  epoetin barry-epbx (RETACRIT) Injectable 99183 Unit(s) IV Push every 7 days  everolimus (ZORTRESS) 2 milliGRAM(s) Oral <User Schedule>  everolimus (ZORTRESS) 2 milliGRAM(s) Oral <User Schedule>  famotidine    Tablet 40 milliGRAM(s) Oral daily  heparin   Injectable 5000 Unit(s) SubCutaneous every 12 hours  insulin glargine Injectable (LANTUS) 15 Unit(s) SubCutaneous at bedtime  insulin lispro (ADMELOG) corrective regimen sliding scale   SubCutaneous three times a day before meals  insulin lispro (ADMELOG) corrective regimen sliding scale   SubCutaneous at bedtime  insulin lispro Injectable (ADMELOG) 5 Unit(s) SubCutaneous three times a day before meals  magnesium oxide 400 milliGRAM(s) Oral daily  meropenem  IVPB 500 milliGRAM(s) IV Intermittent <User Schedule>  nystatin    Suspension 267880 Unit(s) Oral four times a day  ofloxacin 0.3% Solution 10 Drop(s) Right Ear two times a day  predniSONE   Tablet 5 milliGRAM(s) Oral two times a day  sodium bicarbonate 1300 milliGRAM(s) Oral three times a day  tamsulosin 0.4 milliGRAM(s) Oral at bedtime  valGANciclovir 450 milliGRAM(s) Oral <User Schedule>  vancomycin    Solution 125 milliGRAM(s) Oral every 6 hours    MEDICATIONS  (PRN):      PAST MEDICAL & SURGICAL HISTORY:  GIB (gastrointestinal bleeding)    GERD with esophagitis  Gastritis &amp; Non Bleeding Ulcers    Hepatic encephalopathy    Obesity    Fatty liver disease, nonalcoholic    Renal stones  25 years ago    Hypertension    Neuropathy    Hypercholesteremia    Diabetes    S/P cholecystectomy        Vital Signs Last 24 Hrs  T(C): 36.9 (23 Dec 2020 09:00), Max: 38.1 (22 Dec 2020 21:01)  T(F): 98.5 (23 Dec 2020 09:00), Max: 100.5 (22 Dec 2020 21:01)  HR: 79 (23 Dec 2020 09:00) (75 - 101)  BP: 165/74 (23 Dec 2020 09:00) (135/66 - 165/74)  BP(mean): 98 (23 Dec 2020 01:01) (94 - 107)  RR: 18 (23 Dec 2020 09:00) (12 - 91)  SpO2: 97% (23 Dec 2020 09:00) (92% - 100%)    I&O's Summary    22 Dec 2020 07:01  -  23 Dec 2020 07:00  --------------------------------------------------------  IN: 1200 mL / OUT: 1675 mL / NET: -475 mL                              6.7    3.43  )-----------( 151      ( 23 Dec 2020 05:43 )             21.4     12-23    133<L>  |  97  |  59<H>  ----------------------------<  257<H>  4.3   |  23  |  4.73<H>    Ca    8.0<L>      23 Dec 2020 05:43  Phos  5.0     12-23  Mg     2.0     12-23    TPro  5.4<L>  /  Alb  2.7<L>  /  TBili  0.1<L>  /  DBili  x   /  AST  24  /  ALT  25  /  AlkPhos  153<H>  12-23          Culture - Blood (collected 12-20-20 @ 11:52)  Source: .Blood Blood-Peripheral  Preliminary Report (12-21-20 @ 12:02):    No growth to date.    Culture - Blood (collected 12-20-20 @ 11:52)  Source: .Blood Blood-Peripheral  Preliminary Report (12-21-20 @ 12:02):    No growth to date.    Culture - Urine (collected 12-17-20 @ 20:08)  Source: .Urine Catheterized  Final Report (12-18-20 @ 15:59):    No growth                  Review of systems  Gen: febrile  Skin:  R heel wound   Head/Eyes/Ears/Mouth: No headache; Normal hearing; Normal vision w/o blurriness; No sinus pain/discomfort, sore throat  Respiratory: No dyspnea, cough, wheezing, hemoptysis  CV: No chest pain, PND, orthopnea  GI:  denies abdominal pain, diarrhea, constipation, nausea, vomiting, melena, hematochezia  : No increased frequency, dysuria, hematuria, nocturia  MSK: No joint pain/swelling; no back pain; no edema  Neuro: No dizziness/lightheadedness, weakness, seizures, numbness, tingling  Heme: No easy bruising or bleeding  Endo: No heat/cold intolerance  Psych: No significant nervousness, anxiety, stress, depression  All other systems were reviewed and are negative, except as noted.      PHYSICAL EXAM:  Constitutional: Well developed / well nourished  Eyes: Anicteric, PERRLA  ENMT: nc/at  Respiratory: CTA B/L  Cardiovascular: RRR, + heart murmur  Gastrointestinal: Soft, non distended, non tender, incision well healed  Genitourinary: Voiding spontaneously  Extremities: SCD's in place and working bilaterally. no edema  Vascular: Palpable dp pulses bilaterally  Neurological: A&O x3  Skin: R heel ulcer: no drainage, no cellulitis  Musculoskeletal: Moving all extremities  Psychiatric: Responsive

## 2020-12-23 NOTE — PROGRESS NOTE ADULT - ASSESSMENT
65 year old man type 2 diabetes, HLD, GERD, HFpEF with mild LV diastolic dysfunction, and decompensated JEAN cirrhosis, duodenal ulcer, GAVE and duodenal AVM s/p APC (last on 10/11/19) and SHENG who is now s/p liver transplant (7/2/20) with post-op course c/b tacrolimus toxicity and hospital delirium improving after stopping CNI and replacing w/ everolimus. Patient recently presented 11/20/20 with worsening LE swelling and right foot wound with wound culture 11/24 growing S. epidermidis and calcaneus bone biopsy culture 11/24 demonstrating osteomyelitis sent home with cefepime. Patient now with sepsis and MISA likely secondary to cdiff. Patient having low grade fever.      Impression:   # Fever: Febrile to 100.5 overnight. Unclear etiology. May be secondary to underlying infection (bladder source?) vs AIN vs other unclear infection. Despite antibiotics still having fevers. Has cdiff which now resolving. Possible osteo though low suspicion from ID and podiatry teams as patient was on targeted antibiotics with clinical appearance of a healing wound.  # Cdiff: on po vanco since 12/15. Continues to improved from symptom standpoint. WBC wnl. No fever in 24 hours.  # MISA with c/f uremia: Mental status improving. Increasing urine output can be reassuring. Remains nonoliguric with 1.7L output yesterday. Now on intermittent dialysis. Kidney slightly enlarged on u/s with labs and clinical picture continue with AIN althought cannot exclude acute tubular necrosis. FeNa and FeUrea suggest possible AIN (from cefepime)  # Right foot osteomyelitis:  reviewed by podiatry, appears stable. s/p surgical debridement and graft placement on 11/24 by Podiatry. Wound culture grew pseudomonas and bone bx on 11/24 growing S. epidermidis - likely contaminant. Had PICC line, now removed. On meropenem  # Otitis externa: on floxin drops  # S/p liver transplant 7/2/20: Transaminases and INR mildly elevated likely in setting of infection.   Recipient Info:   ABO: A  CMV:  Neg  EBV Positive  Donor:  Donor ID:  [id number] Match: [id number]  Age: 29 ABO:  A1 High Risk:   No COD: Cardiovascular  Anti CMV positive, EBV IgG- positive  HepBcAb-neg, Hepatitis C-DANA- neg, Hepatitis C ab-neg  # Fever - unclear etiology, infectious workup ongoing.  # Bladder wall thickening  # Type 2 diabetes  # SHENG     Recommendations:  - check CT A/P to monitor for potential source of fever/infection  - recheck blood culture per transplant ID reccs  - appreciate transplant ID and renal reccs  - continue with po vanco, meropenum, and floxin drops per ENT  - strict glycemic control, improved sugar control currently  - strict I/Os  - immunosuppressives with everolimus and prednisone  - appreciate transplant ID, renal, surgery reccs  - hold bactrim given MISA  - monitor CBC, CMP, INR  - monitor fever curve  - transplant hepatology to follow

## 2020-12-23 NOTE — PROVIDER CONTACT NOTE (OTHER) - ACTION/TREATMENT ORDERED:
As per NP Flora HOWARD, Transplant team: Pt being transported to CT A/P now. Will f/u w/ intervention once pt returns.
As per Sana NP- will refrain from PO Tylenol at this time. Draw STAT CMP to assess LFTs. Pending blood work result- will order Tylenol 650mg as needed. Apply ice packs until bloodwork is resulted
As per Sana NP- will refrain from PO Tylenol considering elevated LFTs. Apply ice packs as needed- reassess temperature following application.
standing admelog dose increased from 5 to 8 with meals.

## 2020-12-23 NOTE — PROGRESS NOTE ADULT - ATTENDING COMMENTS
Mr. 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    # Fevers: Febrile to Tmax 100.5 in past 24h again. Continuing meropenem, and discussed with Transplant Surgery and Transplant ID re: repeating imaging for re-evaluation for possible ?urinary infectious source, less likely related to his OM. May also be non-infectious due to AIN.    # MISA: S/p HD 12/19 and 12/22. Concern for ATN vs cefepime- or PPI-related AIN. Cefepime last received 12/14 and PPI discontinued today. Urine output remains excellent. Deferring renal biopsy for now per discussion with Transplant Surgery. Deferring high-dose steroids for ?AIN for now given steroid-related risks given his immunosuppression, recent C diff colitis, hyperglycemia, and history of delirium.    # C diff colitis: Symptoms resolved, apart from fevers (as above). Continuing oral vancomycin (12/15- ).    # R calcaneous osteomyelitis: No cellulitis on exam. Previously switched from cefepime to meropenem (12/15- ) due to concern for AIN, as above.    # Post-DDLT: Liver enzymes improved since admission. Currently on prednisone 5 mg po bid. MMF held. Continuing everolimus 2 mg po q12h and will adjust dose by trough levels. Remains on Valcyte for CMV prophylaxis and CMV PCR was negative (12/16). Bactrim held due to MISA.    Please don't hesitate to call with any questions or concerns.    Letitia Mo M.D., Ph.D.  Transplant Hepatology  Cell: [phone]

## 2020-12-23 NOTE — PROGRESS NOTE ADULT - SUBJECTIVE AND OBJECTIVE BOX
Follow Up:  Fever, s/p OLT    Interval History: low grade fever overnight. denies n/v/d or abdominal pain.     REVIEW OF SYSTEMS  [  ] ROS unobtainable because:    [ x ] All other systems negative except as noted below    Constitutional:  [x ] fever [ ] chills  [ ] weight loss  [ ] weakness  Skin:  [ ] rash [ ] phlebitis	  Eyes: [ ] icterus [ ] pain  [ ] discharge	  ENMT: [ ] sore throat  [ ] thrush [ ] ulcers [ ] exudates  Respiratory: [ ] dyspnea [ ] hemoptysis [ ] cough [ ] sputum	  Cardiovascular:  [ ] chest pain [ ] palpitations [ ] edema	  Gastrointestinal:  [ ] nausea [ ] vomiting [ ] diarrhea [ ] constipation [ ] pain	  Genitourinary:  [ ] dysuria [ ] frequency [ ] hematuria [ ] discharge [ ] flank pain  [ ] incontinence  Musculoskeletal:  [ ] myalgias [ ] arthralgias [ ] arthritis  [ ] back pain  Neurological:  [ ] headache [ ] seizures  [ ] confusion/altered mental status    Allergies  codeine (Anaphylaxis)        ANTIMICROBIALS:  meropenem  IVPB 500 every 12 hours  nystatin    Suspension 753353 four times a day  valGANciclovir 450 <User Schedule>  vancomycin    Solution 125 every 6 hours      OTHER MEDS:  MEDICATIONS  (STANDING):  aspirin  chewable 81 daily  epoetin barry-epbx (RETACRIT) Injectable 39478 every 7 days  everolimus (ZORTRESS) 2 <User Schedule>  everolimus (ZORTRESS) 2 <User Schedule>  famotidine    Tablet 40 daily  heparin   Injectable 5000 every 12 hours  insulin glargine Injectable (LANTUS) 15 at bedtime  insulin lispro (ADMELOG) corrective regimen sliding scale  three times a day before meals  insulin lispro (ADMELOG) corrective regimen sliding scale  at bedtime  insulin lispro Injectable (ADMELOG) 5 three times a day before meals  predniSONE   Tablet 5 two times a day  tamsulosin 0.4 at bedtime      Vital Signs Last 24 Hrs  T(C): 37.1 (23 Dec 2020 17:33), Max: 38.1 (22 Dec 2020 21:01)  T(F): 98.8 (23 Dec 2020 17:33), Max: 100.5 (22 Dec 2020 21:01)  HR: 76 (23 Dec 2020 17:33) (75 - 101)  BP: 182/85 (23 Dec 2020 17:33) (141/68 - 182/85)  BP(mean): 122 (23 Dec 2020 17:33) (98 - 122)  RR: 18 (23 Dec 2020 17:33) (18 - 18)  SpO2: 96% (23 Dec 2020 17:33) (94% - 100%)    PHYSICAL EXAMINATION:  General: Alert and Awake, NAD  HEENT: PERRL, EOMI  Neck: Supple  Cardiac: RRR, No M/R/G  Resp: CTAB, No Wh/Rh/Ra  Abdomen: NBS, NT/ND, No HSM, No rigidity or guarding  MSK: RLE foot with overlying dressing. No LE edema. No Calf tenderness  : No bob  Skin: No rashes or lesions. Skin is warm and dry to the touch.   Neuro: Alert and Awake. CN 2-12 Grossly intact. Moves all four extremities spontaneously.  Psych: Calm, Pleasant, Cooperative  Vasc: RIJ catheter (No surrounding erythema, drainage or tenderness to palpation)                          6.7    3.43  )-----------( 151      ( 23 Dec 2020 05:43 )             21.4       12-23    133<L>  |  97  |  59<H>  ----------------------------<  257<H>  4.3   |  23  |  4.73<H>    Ca    8.0<L>      23 Dec 2020 05:43  Phos  5.0     12-23  Mg     2.0     12-23    TPro  5.4<L>  /  Alb  2.7<L>  /  TBili  0.1<L>  /  DBili  x   /  AST  24  /  ALT  25  /  AlkPhos  153<H>  12-23          MICROBIOLOGY:  v  .Blood Blood-Peripheral  12-20-20   No growth to date.  --  --      .Urine Catheterized  12-17-20   No growth  --  --      .Catheter PICC line tip  12-15-20   < 15 colonies Coag Negative Staphylococcus  --  --      .Urine Clean Catch (Midstream)  12-15-20   <10,000 CFU/mL Normal Urogenital Shantel  --  --      .Blood PICC/PERC Single Lumen  12-14-20   No Growth Final  --  --      .Tissue Other  11-25-20   Growth in fluid media only Staphylococcus epidermidis  "Susceptibilities not performed"  --  Staphylococcus epidermidis          Rapid RVP Result: NotDetec (12-20 @ 10:04)  Rapid RVP Result: NotDetec (12-17 @ 18:36)        RADIOLOGY:    <The imaging below has been reviewed and visualized by me independently. Findings as detailed in report below>    EXAM:  XR CHEST PORTABLE URGENT 1V                        PROCEDURE DATE:  12/21/2020    The heart is enlarged. The lungs are clear. The right central line was placed and the tip is in superior vena cava. No pneumothorax. No pleural effusion. No acute bony pathology could be identified.

## 2020-12-23 NOTE — PROVIDER CONTACT NOTE (OTHER) - BACKGROUND
PMH HTN, HLD, IDDM A1C 8.6, s/p OLT on 7/2/20. pt p/w transamintis and MISA s/p shiley requiring emergent HD. SBP 170s-180s, now 176/80

## 2020-12-23 NOTE — PROGRESS NOTE ADULT - SUBJECTIVE AND OBJECTIVE BOX
[facility]  Ramón Morales PA-C  [address] [place] [address]  [phone]      INTERVAL HPI/OVERNIGHT EVENTS:    patient seen and examined  febrile overnight, Tmax 100.5F  diarrhea resolved  for CT abd/pelvis today           MEDICATIONS  (STANDING):  aspirin  chewable 81 milliGRAM(s) Oral daily  chlorhexidine 2% Cloths 1 Application(s) Topical daily  epoetin barry-epbx (RETACRIT) Injectable 57761 Unit(s) SubCutaneous every 7 days  everolimus (ZORTRESS) 1.5 milliGRAM(s) Oral <User Schedule>  heparin   Injectable 5000 Unit(s) SubCutaneous every 12 hours  insulin glargine Injectable (LANTUS) 10 Unit(s) SubCutaneous at bedtime  insulin lispro (ADMELOG) corrective regimen sliding scale   SubCutaneous three times a day before meals  insulin lispro (ADMELOG) corrective regimen sliding scale   SubCutaneous at bedtime  insulin lispro Injectable (ADMELOG) 5 Unit(s) SubCutaneous three times a day before meals  insulin lispro Injectable (ADMELOG) 2 Unit(s) SubCutaneous once  magnesium oxide 400 milliGRAM(s) Oral daily  meropenem  IVPB 500 milliGRAM(s) IV Intermittent every 24 hours  nystatin    Suspension 002676 Unit(s) Oral four times a day  ofloxacin 0.3% Solution 10 Drop(s) Right Ear two times a day  pantoprazole    Tablet 40 milliGRAM(s) Oral before breakfast  predniSONE   Tablet 5 milliGRAM(s) Oral two times a day  sodium bicarbonate 1300 milliGRAM(s) Oral three times a day  sodium bicarbonate  Infusion 0.05 mEq/kG/Hr (75 mL/Hr) IV Continuous <Continuous>  tamsulosin 0.4 milliGRAM(s) Oral at bedtime  valGANciclovir 450 milliGRAM(s) Oral <User Schedule>  vancomycin    Solution 125 milliGRAM(s) Oral every 6 hours    MEDICATIONS  (PRN):      Allergies    codeine (Anaphylaxis)    Intolerances        ROS:   General:  No wt loss, +fevers, chills, night sweats, + fatigue,   Eyes:  Good vision, no reported pain  ENT:  No sore throat, pain, runny nose, dysphagia  CV:  No pain, palpitations, hypo/hypertension  Resp:  No dyspnea, cough, tachypnea, wheezing  GI:  No pain, No nausea, No vomiting, No diarrhea, No constipation, No weight loss, No fever, No pruritis, No rectal bleeding, No tarry stools, No dysphagia,  :  No pain, bleeding, incontinence, nocturia  Muscle:  No pain, weakness  Neuro:  No weakness, tingling, memory problems  Psych:  No fatigue, insomnia, mood problems, depression  Endocrine:  No polyuria, polydipsia, cold/heat intolerance  Heme:  No petechiae, ecchymosis, easy bruisability  Skin:  No rash, tattoos, scars, edema      PHYSICAL EXAM:   Vital Signs:  Vital Signs Last 24 Hrs  T(C): 36.5 (19 Dec 2020 15:00), Max: 37.2 (19 Dec 2020 04:00)  T(F): 97.7 (19 Dec 2020 15:00), Max: 99 (19 Dec 2020 04:00)  HR: 75 (19 Dec 2020 15:00) (75 - 91)  BP: 148/68 (19 Dec 2020 15:00) (118/81 - 163/70)  BP(mean): 95 (19 Dec 2020 15:00) (90 - 106)  RR: 20 (19 Dec 2020 15:00) (16 - 20)  SpO2: 96% (19 Dec 2020 15:00) (92% - 98%)  Daily     Daily Weight in k.8 (18 Dec 2020 20:35)    GENERAL:  no distress  HEENT:  NC/AT  ABDOMEN:  Soft, non-tender, non-distended, normoactive bowel sounds  EXTEREMITIES:  no cyanosis,clubbing or edema  SKIN:  No rash/erythema/ecchymoses/petechiae/wounds/abscess/warm/dry  NEURO:  Alert, oriented, no asterixis, no tremor, no encephalopathy      LABS:                        7.4    4.50  )-----------( 179      ( 19 Dec 2020 05:29 )             22.9     12-    133<L>  |  101  |  92<H>  ----------------------------<  224<H>  4.7   |  16<L>  |  6.94<H>    Ca    8.2<L>      19 Dec 2020 05:29  Phos  4.6       Mg     2.2         TPro  5.8<L>  /  Alb  2.6<L>  /  TBili  0.1<L>  /  DBili  x   /  AST  31  /  ALT  47<H>  /  AlkPhos  222<H>  -    PT/INR - ( 19 Dec 2020 05:29 )   PT: 13.1 sec;   INR: 1.10 ratio         PTT - ( 19 Dec 2020 05:29 )  PTT:26.1 sec  Urinalysis Basic - ( 17 Dec 2020 16:54 )    Color: Light Yellow / Appearance: Slightly Turbid / S.012 / pH: x  Gluc: x / Ketone: Negative  / Bili: Negative / Urobili: Negative   Blood: x / Protein: 100 mg/dL / Nitrite: Negative   Leuk Esterase: Large / RBC: 3 /hpf /  /HPF   Sq Epi: x / Non Sq Epi: 0 /hpf / Bacteria: Negative        RADIOLOGY & ADDITIONAL TESTS:

## 2020-12-23 NOTE — PROGRESS NOTE ADULT - ASSESSMENT
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, CNI Neurotoxicity), IDDM, hyperlipidemia, obesity, HFpEF with mild LV diastolic dysfunction, MGUS who presented with fevers, diarrhea and abnormal labwork    Of note, recent admission for R Heel Osteomyelitis   s/p right foot heel incision and drainage w/ application of stravix and bone biopsy on 11/24  Superficial cultures with Pseudomonas and Operative Cultures with Coag Neg Staph in fluid media  Discharged on IV Cefepime with plan for 6 week course    On 12/14 labwork was noted to have MISA and Transaminitis  Of note [facility] outpatient labs from 12/9 with Cr of 1.41 and LFT's WNL    U/A (12/15, 12/17, 12/20), with pyuria  UCx (12/15, 12/17) with NGTD  COVID19 PCR (12/14) Negative  RVP / COVID19 PCR (12/17, 12/20) Negative  C diff toxin assay (12/15) indeterminate with Positive C diff PCR  CXR (12/15, 12/21) without pulmonary infiltrates  CT A/P (12/15) with distal left ureteral calculus without hydronephrosis and Diffuse wall thickening, slightly asymmetric on the left but without prostate abscess.   Renal US (12/16) with left collecting system fullness but no hydronephrosis    Curiously patient's diarrhea resolved in under 24 hours of PO Vancomycin therapy which does raise question if the PCR picked up colonization as opposed to true infection.   We will clearly have to cover with full course for C diff.    He was also noted to have pyuria on his U/A, with bladder wall thickening and some left ureteral prominence with nonobstructing calculus.   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   Now with no growth to date on his urine cultures.     The patient's heel appears without cellulitis or drainage so I doubt that this is the source of his fever.     With regards to his MISA etiology is also unclear - could be from AIN from Cefepime or alternate agent. Urine eosinophils were positive. He also had could have ATN from diarrhea and intravascular volume depletion at home but no significant hypotension here so this does bring ATN into question. Reasonable to avoid Cefepime at this time and cover with Meropenem (which would cover his previous ESBL isolates and the Pseudomonas from his osteomyelitis)    With regards to his persistent fever: unclear etiology at this point. He does not appear toxic and culture data is unrevealing. Possible it is noninfectious. He did have transaminitis on presentation and now only with alk phos elevation. Initially question of rejection was raised but with normalization of LFT's this appears less likely. Possible the AIN (if this is the cause of the MISA) is contributing to his fevers.     Overall, C diff infection, Fever, Transaminitis, MISA, Abnormal Urinalysis, Nephrolithiasis, Heel Osteomyelitis, Liver Transplant Recipient    # Acute kidney injury / Abnormal Urinalysis / Nephrolithiasis   - Increase Meropenem to 500 mg IV q12 hours; now off of Cefepime given ?AIN  - Management of MISA per transplant nephrology    # Fever / Transaminitis  - Recommend repeat BCx with next fever  - Recommend EBV Serum PCR, Parvovirus Serum PCR, Adenovirus Serum PCR, HHV6 Serum PCR, Tick Born Diseases Panel, Babesia Serum PCR  - Recommend CT A/P  - Continue Meropenem 500 mg IV q24 hours (will need to increase to 500 mg IV Q12H if renal recovery)  - If clinical deterioration / instability recommend Daptomycin (Prior VRE infection post-transplant)    # C. diff Infection / Diarrhea  - Continue oral vancomycin 125 mg PO q6 hours x 14 days     # RLE Heel OM  - Increase Meropenem to 500 mg IV q12 hours    #Liver Transplant Recipient  - Increase Valcyte PPx to TIW  - Bactrim on hold due to MISA - can consider adding Mepron    I will continue to follow. Please feel free to contact me with any further questions.    Eusebio Pérez M.D.  [facility] Division of Infectious Disease  8AM-5PM: Pager Number [phone]  After Hours (or if no response): Please contact the Infectious Diseases Office at [phone]     The above assessment and plan were discussed with Dr Chris

## 2020-12-23 NOTE — PROGRESS NOTE ADULT - ATTENDING COMMENTS
seen and assessed on 12/23    low grade fever, appears non-toxic.  received HD.  awaiting renal recovery.   immuno: everolimus/pred.

## 2020-12-23 NOTE — PROGRESS NOTE ADULT - SUBJECTIVE AND OBJECTIVE BOX
Chief Complaint:  Patient is a 65y old  Male who presents with a chief complaint of transaminitis (23 Dec 2020 09:23)      Interval Events: Patient remains febrile. Denies n/v, abdominal pain, dysuria or hematuria.    Allergies:  codeine (Anaphylaxis)      Hospital Medications:  aspirin  chewable 81 milliGRAM(s) Oral daily  chlorhexidine 2% Cloths 1 Application(s) Topical <User Schedule>  epoetin barry-epbx (RETACRIT) Injectable 50581 Unit(s) IV Push every 7 days  everolimus (ZORTRESS) 2 milliGRAM(s) Oral <User Schedule>  everolimus (ZORTRESS) 2 milliGRAM(s) Oral <User Schedule>  famotidine    Tablet 40 milliGRAM(s) Oral daily  heparin   Injectable 5000 Unit(s) SubCutaneous every 12 hours  insulin glargine Injectable (LANTUS) 15 Unit(s) SubCutaneous at bedtime  insulin lispro (ADMELOG) corrective regimen sliding scale   SubCutaneous three times a day before meals  insulin lispro (ADMELOG) corrective regimen sliding scale   SubCutaneous at bedtime  insulin lispro Injectable (ADMELOG) 5 Unit(s) SubCutaneous three times a day before meals  magnesium oxide 400 milliGRAM(s) Oral daily  meropenem  IVPB 500 milliGRAM(s) IV Intermittent <User Schedule>  nystatin    Suspension 596169 Unit(s) Oral four times a day  ofloxacin 0.3% Solution 10 Drop(s) Right Ear two times a day  predniSONE   Tablet 5 milliGRAM(s) Oral two times a day  sodium bicarbonate 1300 milliGRAM(s) Oral three times a day  tamsulosin 0.4 milliGRAM(s) Oral at bedtime  valGANciclovir 450 milliGRAM(s) Oral <User Schedule>  vancomycin    Solution 125 milliGRAM(s) Oral every 6 hours      PMHX/PSHX:  GIB (gastrointestinal bleeding)    GERD with esophagitis    Hepatic encephalopathy    Obesity    Fatty liver disease, nonalcoholic    Renal stones    Hypertension    Neuropathy    Hypercholesteremia    Diabetes    S/P cholecystectomy    No significant past surgical history        Family history:  Family history of type 2 diabetes mellitus    Family history of hypertension    Family history of stomach cancer    No pertinent family history in first degree relatives        ROS: As per HPI, 14-point ROS negative otherwise.    General:  No wt loss, fevers, chills, night sweats, fatigue,   Eyes:  Good vision, no reported pain  ENT:  No sore throat, pain, runny nose, dysphagia  CV:  No pain, palpitations, hypo/hypertension  Resp:  No dyspnea, cough, tachypnea, wheezing  GI:  See HPI  :  No pain, bleeding, incontinence, nocturia  Muscle:  No pain, weakness  Neuro:  No weakness, tingling, memory problems  Psych:  No fatigue, insomnia, mood problems, depression  Endocrine:  No polyuria, polydipsia, cold/heat intolerance  Heme:  No petechiae, ecchymosis, easy bruisability  Skin:  No rash, edema      PHYSICAL EXAM:     Vital Signs:  Vital Signs Last 24 Hrs  T(C): 36.9 (23 Dec 2020 09:00), Max: 38.1 (22 Dec 2020 21:01)  T(F): 98.5 (23 Dec 2020 09:00), Max: 100.5 (22 Dec 2020 21:01)  HR: 79 (23 Dec 2020 09:00) (75 - 101)  BP: 165/74 (23 Dec 2020 09:00) (135/66 - 165/74)  BP(mean): 98 (23 Dec 2020 01:01) (94 - 107)  RR: 18 (23 Dec 2020 09:00) (17 - 91)  SpO2: 97% (23 Dec 2020 09:00) (92% - 100%)  Daily     Daily Weight in k.7 (23 Dec 2020 08:31)    GENERAL:  appears comfortable, no acute distress  HEENT:  NC/AT,  conjunctivae clear, sclera -anicteric  CHEST:  no increased effort  HEART:  Regular rate and rhythm  ABDOMEN:  Soft, non-tender, non-distended,  no masses ,no hepato-splenomegaly,   EXTREMITIES:  no cyanosis, clubbing or edema  SKIN:  R heel wound appears clean with eschar w/o pus or drainage  NEURO:  Alert, oriented    LABS:                        6.7    3.43  )-----------( 151      ( 23 Dec 2020 05:43 )             21.4     12-23    133<L>  |  97  |  59<H>  ----------------------------<  257<H>  4.3   |  23  |  4.73<H>    Ca    8.0<L>      23 Dec 2020 05:43  Phos  5.0     12-23  Mg     2.0     12-23    TPro  5.4<L>  /  Alb  2.7<L>  /  TBili  0.1<L>  /  DBili  x   /  AST  24  /  ALT  25  /  AlkPhos  153<H>  12-    LIVER FUNCTIONS - ( 23 Dec 2020 05:43 )  Alb: 2.7 g/dL / Pro: 5.4 g/dL / ALK PHOS: 153 U/L / ALT: 25 U/L / AST: 24 U/L / GGT: x           PT/INR - ( 23 Dec 2020 05:43 )   PT: 13.2 sec;   INR: 1.11 ratio         PTT - ( 23 Dec 2020 05:43 )  PTT:26.0 sec    Amylase Serum--      Lipase serum--       [id number]      Imaging:

## 2020-12-23 NOTE — PROGRESS NOTE ADULT - ASSESSMENT
MISA  c diff  s/p liver transplant    diarrhea resolved  cont po vanco per ID   diet as tolerated  HD per renal   lfts improving   follow-up CT abd/pelvis   care per transplant services/SICU greatly appreciated

## 2020-12-24 LAB
ALBUMIN SERPL ELPH-MCNC: 2.7 G/DL — LOW (ref 3.3–5)
ALP SERPL-CCNC: 163 U/L — HIGH (ref 40–120)
ALT FLD-CCNC: 27 U/L — SIGNIFICANT CHANGE UP (ref 10–45)
ANION GAP SERPL CALC-SCNC: 15 MMOL/L — SIGNIFICANT CHANGE UP (ref 5–17)
APTT BLD: 28.1 SEC — SIGNIFICANT CHANGE UP (ref 27.5–35.5)
AST SERPL-CCNC: 30 U/L — SIGNIFICANT CHANGE UP (ref 10–40)
BASOPHILS # BLD AUTO: 0.02 K/UL — SIGNIFICANT CHANGE UP (ref 0–0.2)
BASOPHILS NFR BLD AUTO: 0.5 % — SIGNIFICANT CHANGE UP (ref 0–2)
BILIRUB SERPL-MCNC: 0.2 MG/DL — SIGNIFICANT CHANGE UP (ref 0.2–1.2)
BUN SERPL-MCNC: 70 MG/DL — HIGH (ref 7–23)
CALCIUM SERPL-MCNC: 7.9 MG/DL — LOW (ref 8.4–10.5)
CHLORIDE SERPL-SCNC: 96 MMOL/L — SIGNIFICANT CHANGE UP (ref 96–108)
CO2 SERPL-SCNC: 22 MMOL/L — SIGNIFICANT CHANGE UP (ref 22–31)
CREAT SERPL-MCNC: 4.92 MG/DL — HIGH (ref 0.5–1.3)
EOSINOPHIL # BLD AUTO: 0.05 K/UL — SIGNIFICANT CHANGE UP (ref 0–0.5)
EOSINOPHIL NFR BLD AUTO: 1.2 % — SIGNIFICANT CHANGE UP (ref 0–6)
EVEROLIMUS, WHOLE BLOOD RESULT: 3.6 NG/ML — SIGNIFICANT CHANGE UP (ref 3–8)
EVEROLIMUS, WHOLE BLOOD RESULT: 3.9 NG/ML — SIGNIFICANT CHANGE UP (ref 3–8)
FUNGITELL: <31 PG/ML — SIGNIFICANT CHANGE UP
GLUCOSE BLDC GLUCOMTR-MCNC: 175 MG/DL — HIGH (ref 70–99)
GLUCOSE BLDC GLUCOMTR-MCNC: 186 MG/DL — HIGH (ref 70–99)
GLUCOSE BLDC GLUCOMTR-MCNC: 209 MG/DL — HIGH (ref 70–99)
GLUCOSE BLDC GLUCOMTR-MCNC: 228 MG/DL — HIGH (ref 70–99)
GLUCOSE SERPL-MCNC: 284 MG/DL — HIGH (ref 70–99)
HCT VFR BLD CALC: 25.8 % — LOW (ref 39–50)
HGB BLD-MCNC: 8.4 G/DL — LOW (ref 13–17)
IMM GRANULOCYTES NFR BLD AUTO: 1.2 % — SIGNIFICANT CHANGE UP (ref 0–1.5)
INR BLD: 1.05 RATIO — SIGNIFICANT CHANGE UP (ref 0.88–1.16)
LYMPHOCYTES # BLD AUTO: 0.63 K/UL — LOW (ref 1–3.3)
LYMPHOCYTES # BLD AUTO: 14.6 % — SIGNIFICANT CHANGE UP (ref 13–44)
MAGNESIUM SERPL-MCNC: 1.9 MG/DL — SIGNIFICANT CHANGE UP (ref 1.6–2.6)
MCHC RBC-ENTMCNC: 29 PG — SIGNIFICANT CHANGE UP (ref 27–34)
MCHC RBC-ENTMCNC: 32.6 GM/DL — SIGNIFICANT CHANGE UP (ref 32–36)
MCV RBC AUTO: 89 FL — SIGNIFICANT CHANGE UP (ref 80–100)
MONOCYTES # BLD AUTO: 0.39 K/UL — SIGNIFICANT CHANGE UP (ref 0–0.9)
MONOCYTES NFR BLD AUTO: 9 % — SIGNIFICANT CHANGE UP (ref 2–14)
NEUTROPHILS # BLD AUTO: 3.18 K/UL — SIGNIFICANT CHANGE UP (ref 1.8–7.4)
NEUTROPHILS NFR BLD AUTO: 73.5 % — SIGNIFICANT CHANGE UP (ref 43–77)
NRBC # BLD: 0 /100 WBCS — SIGNIFICANT CHANGE UP (ref 0–0)
PHOSPHATE SERPL-MCNC: 5.8 MG/DL — HIGH (ref 2.5–4.5)
PLATELET # BLD AUTO: 156 K/UL — SIGNIFICANT CHANGE UP (ref 150–400)
POTASSIUM SERPL-MCNC: 4.7 MMOL/L — SIGNIFICANT CHANGE UP (ref 3.5–5.3)
POTASSIUM SERPL-SCNC: 4.7 MMOL/L — SIGNIFICANT CHANGE UP (ref 3.5–5.3)
PROT SERPL-MCNC: 5.8 G/DL — LOW (ref 6–8.3)
PROTHROM AB SERPL-ACNC: 12.6 SEC — SIGNIFICANT CHANGE UP (ref 10.6–13.6)
RBC # BLD: 2.89 M/UL — LOW (ref 4.2–5.8)
RBC # BLD: 2.9 M/UL — LOW (ref 4.2–5.8)
RBC # FLD: 13.6 % — SIGNIFICANT CHANGE UP (ref 10.3–14.5)
RETICS #: 34.1 K/UL — SIGNIFICANT CHANGE UP (ref 25–125)
RETICS/RBC NFR: 1.2 % — SIGNIFICANT CHANGE UP (ref 0.5–2.5)
SARS-COV-2 IGG SERPL IA-ACNC: 1 RATIO — HIGH
SARS-COV-2 IGG SERPL QL IA: ABNORMAL
SARS-COV-2 IGG SERPL QL IA: NEGATIVE — SIGNIFICANT CHANGE UP
SARS-COV-2 IGM SERPL IA-ACNC: 0.62 RATIO — SIGNIFICANT CHANGE UP
SODIUM SERPL-SCNC: 133 MMOL/L — LOW (ref 135–145)
WBC # BLD: 4.32 K/UL — SIGNIFICANT CHANGE UP (ref 3.8–10.5)
WBC # FLD AUTO: 4.32 K/UL — SIGNIFICANT CHANGE UP (ref 3.8–10.5)

## 2020-12-24 PROCEDURE — 99232 SBSQ HOSP IP/OBS MODERATE 35: CPT

## 2020-12-24 PROCEDURE — 99232 SBSQ HOSP IP/OBS MODERATE 35: CPT | Mod: GC

## 2020-12-24 PROCEDURE — 99233 SBSQ HOSP IP/OBS HIGH 50: CPT | Mod: GC

## 2020-12-24 RX ORDER — ACETAMINOPHEN 500 MG
650 TABLET ORAL ONCE
Refills: 0 | Status: COMPLETED | OUTPATIENT
Start: 2020-12-24 | End: 2020-12-24

## 2020-12-24 RX ORDER — NIFEDIPINE 30 MG
30 TABLET, EXTENDED RELEASE 24 HR ORAL DAILY
Refills: 0 | Status: DISCONTINUED | OUTPATIENT
Start: 2020-12-24 | End: 2020-12-30

## 2020-12-24 RX ORDER — INSULIN LISPRO 100/ML
7 VIAL (ML) SUBCUTANEOUS
Refills: 0 | Status: DISCONTINUED | OUTPATIENT
Start: 2020-12-24 | End: 2020-12-27

## 2020-12-24 RX ORDER — NIFEDIPINE 30 MG
30 TABLET, EXTENDED RELEASE 24 HR ORAL ONCE
Refills: 0 | Status: COMPLETED | OUTPATIENT
Start: 2020-12-24 | End: 2020-12-24

## 2020-12-24 RX ORDER — INSULIN GLARGINE 100 [IU]/ML
18 INJECTION, SOLUTION SUBCUTANEOUS AT BEDTIME
Refills: 0 | Status: DISCONTINUED | OUTPATIENT
Start: 2020-12-24 | End: 2020-12-30

## 2020-12-24 RX ORDER — FUROSEMIDE 40 MG
40 TABLET ORAL ONCE
Refills: 0 | Status: COMPLETED | OUTPATIENT
Start: 2020-12-24 | End: 2020-12-24

## 2020-12-24 RX ADMIN — CHLORHEXIDINE GLUCONATE 1 APPLICATION(S): 213 SOLUTION TOPICAL at 05:33

## 2020-12-24 RX ADMIN — Medication 1300 MILLIGRAM(S): at 08:43

## 2020-12-24 RX ADMIN — HEPARIN SODIUM 5000 UNIT(S): 5000 INJECTION INTRAVENOUS; SUBCUTANEOUS at 17:48

## 2020-12-24 RX ADMIN — Medication 5 MILLIGRAM(S): at 05:16

## 2020-12-24 RX ADMIN — Medication 2: at 17:48

## 2020-12-24 RX ADMIN — Medication 1300 MILLIGRAM(S): at 21:59

## 2020-12-24 RX ADMIN — VALGANCICLOVIR 450 MILLIGRAM(S): 450 TABLET, FILM COATED ORAL at 09:57

## 2020-12-24 RX ADMIN — Medication 1300 MILLIGRAM(S): at 13:38

## 2020-12-24 RX ADMIN — FAMOTIDINE 40 MILLIGRAM(S): 10 INJECTION INTRAVENOUS at 11:42

## 2020-12-24 RX ADMIN — Medication 125 MILLIGRAM(S): at 11:41

## 2020-12-24 RX ADMIN — INSULIN GLARGINE 18 UNIT(S): 100 INJECTION, SOLUTION SUBCUTANEOUS at 21:55

## 2020-12-24 RX ADMIN — Medication 125 MILLIGRAM(S): at 05:16

## 2020-12-24 RX ADMIN — MEROPENEM 100 MILLIGRAM(S): 1 INJECTION INTRAVENOUS at 05:15

## 2020-12-24 RX ADMIN — Medication 81 MILLIGRAM(S): at 11:42

## 2020-12-24 RX ADMIN — Medication 7 UNIT(S): at 17:49

## 2020-12-24 RX ADMIN — MEROPENEM 100 MILLIGRAM(S): 1 INJECTION INTRAVENOUS at 17:48

## 2020-12-24 RX ADMIN — Medication 10 DROP(S): at 17:48

## 2020-12-24 RX ADMIN — Medication 650 MILLIGRAM(S): at 21:58

## 2020-12-24 RX ADMIN — Medication 4: at 08:42

## 2020-12-24 RX ADMIN — Medication 650 MILLIGRAM(S): at 22:28

## 2020-12-24 RX ADMIN — Medication 5 UNIT(S): at 08:43

## 2020-12-24 RX ADMIN — EVEROLIMUS 2 MILLIGRAM(S): 10 TABLET ORAL at 20:01

## 2020-12-24 RX ADMIN — Medication 500000 UNIT(S): at 05:16

## 2020-12-24 RX ADMIN — Medication 2: at 12:34

## 2020-12-24 RX ADMIN — Medication 125 MILLIGRAM(S): at 17:48

## 2020-12-24 RX ADMIN — Medication 500000 UNIT(S): at 17:48

## 2020-12-24 RX ADMIN — Medication 7 UNIT(S): at 12:33

## 2020-12-24 RX ADMIN — Medication 500000 UNIT(S): at 11:41

## 2020-12-24 RX ADMIN — Medication 30 MILLIGRAM(S): at 17:48

## 2020-12-24 RX ADMIN — Medication 5 MILLIGRAM(S): at 17:48

## 2020-12-24 RX ADMIN — TAMSULOSIN HYDROCHLORIDE 0.4 MILLIGRAM(S): 0.4 CAPSULE ORAL at 21:58

## 2020-12-24 RX ADMIN — EVEROLIMUS 2 MILLIGRAM(S): 10 TABLET ORAL at 08:42

## 2020-12-24 RX ADMIN — Medication 40 MILLIGRAM(S): at 09:56

## 2020-12-24 RX ADMIN — Medication 10 DROP(S): at 05:16

## 2020-12-24 RX ADMIN — MAGNESIUM OXIDE 400 MG ORAL TABLET 400 MILLIGRAM(S): 241.3 TABLET ORAL at 11:41

## 2020-12-24 RX ADMIN — HEPARIN SODIUM 5000 UNIT(S): 5000 INJECTION INTRAVENOUS; SUBCUTANEOUS at 05:15

## 2020-12-24 NOTE — PROGRESS NOTE ADULT - ASSESSMENT
MISA  c diff  s/p liver transplant    diarrhea resolved  cont po vanco per ID   diet as tolerated  HD per renal   lfts improving   ct noted   care per transplant services

## 2020-12-24 NOTE — PROGRESS NOTE ADULT - SUBJECTIVE AND OBJECTIVE BOX
Follow Up:  s/p OLT, Fever, MISA    Interval History:    REVIEW OF SYSTEMS  [  ] ROS unobtainable because:    [  ] All other systems negative except as noted below    Constitutional:  [ ] fever [ ] chills  [ ] weight loss  [ ] weakness  Skin:  [ ] rash [ ] phlebitis	  Eyes: [ ] icterus [ ] pain  [ ] discharge	  ENMT: [ ] sore throat  [ ] thrush [ ] ulcers [ ] exudates  Respiratory: [ ] dyspnea [ ] hemoptysis [ ] cough [ ] sputum	  Cardiovascular:  [ ] chest pain [ ] palpitations [ ] edema	  Gastrointestinal:  [ ] nausea [ ] vomiting [ ] diarrhea [ ] constipation [ ] pain	  Genitourinary:  [ ] dysuria [ ] frequency [ ] hematuria [ ] discharge [ ] flank pain  [ ] incontinence  Musculoskeletal:  [ ] myalgias [ ] arthralgias [ ] arthritis  [ ] back pain  Neurological:  [ ] headache [ ] seizures  [ ] confusion/altered mental status    Allergies  codeine (Anaphylaxis)        ANTIMICROBIALS:  meropenem  IVPB 500 every 12 hours  nystatin    Suspension 301574 four times a day  valGANciclovir 450 <User Schedule>  vancomycin    Solution 125 every 6 hours      OTHER MEDS:  MEDICATIONS  (STANDING):  aspirin  chewable 81 daily  epoetin barry-epbx (RETACRIT) Injectable 12878 every 7 days  everolimus (ZORTRESS) 2 <User Schedule>  everolimus (ZORTRESS) 2 <User Schedule>  famotidine    Tablet 40 daily  heparin   Injectable 5000 every 12 hours  insulin glargine Injectable (LANTUS) 18 at bedtime  insulin lispro (ADMELOG) corrective regimen sliding scale  three times a day before meals  insulin lispro (ADMELOG) corrective regimen sliding scale  at bedtime  insulin lispro Injectable (ADMELOG) 7 three times a day before meals  NIFEdipine XL 30 daily  predniSONE   Tablet 5 two times a day  tamsulosin 0.4 at bedtime      Vital Signs Last 24 Hrs  T(C): 36.9 (24 Dec 2020 16:59), Max: 37 (24 Dec 2020 00:59)  T(F): 98.5 (24 Dec 2020 16:59), Max: 98.6 (24 Dec 2020 00:59)  HR: 78 (24 Dec 2020 16:59) (69 - 84)  BP: 169/79 (24 Dec 2020 16:59) (142/69 - 179/83)  BP(mean): 114 (24 Dec 2020 16:59) (105 - 119)  RR: 18 (24 Dec 2020 16:59) (18 - 20)  SpO2: 96% (24 Dec 2020 16:59) (93% - 100%)    PHYSICAL EXAMINATION:  General: Alert and Awake, NAD  HEENT: PERRL, EOMI  Neck: Supple  Cardiac: RRR, No M/R/G  Resp: CTAB, No Wh/Rh/Ra  Abdomen: NBS, NT/ND, No HSM, No rigidity or guarding  MSK: No LE edema. No Calf tenderness  : No bob  Skin: No rashes or lesions. Skin is warm and dry to the touch.   Neuro: Alert and Awake. CN 2-12 Grossly intact. Moves all four extremities spontaneously.  Psych: Calm, Pleasant, Cooperative                          8.4    4.32  )-----------( 156      ( 24 Dec 2020 05:57 )             25.8       12-24    133<L>  |  96  |  70<H>  ----------------------------<  284<H>  4.7   |  22  |  4.92<H>    Ca    7.9<L>      24 Dec 2020 05:57  Phos  5.8     12-24  Mg     1.9     12-24    TPro  5.8<L>  /  Alb  2.7<L>  /  TBili  0.2  /  DBili  x   /  AST  30  /  ALT  27  /  AlkPhos  163<H>  12-24          MICROBIOLOGY:  v  .Blood Blood-Peripheral  12-20-20   No growth to date.  --  --      .Urine Catheterized  12-17-20   No growth  --  --      .Catheter PICC line tip  12-15-20   < 15 colonies Coag Negative Staphylococcus  --  --      .Urine Clean Catch (Midstream)  12-15-20   <10,000 CFU/mL Normal Urogenital Shantel  --  --      .Blood PICC/PERC Single Lumen  12-14-20   No Growth Final  --  --      .Tissue Other  11-25-20   Growth in fluid media only Staphylococcus epidermidis  "Susceptibilities not performed"  --  Staphylococcus epidermidis          Rapid RVP Result: NotDetec (12-20 @ 10:04)        RADIOLOGY:    <The imaging below has been reviewed and visualized by me independently. Findings as detailed in report below> Follow Up:  s/p OLT, Fever, MISA    Interval History: afebrile overnight. no n/v/d or abdominal pain.     REVIEW OF SYSTEMS  [  ] ROS unobtainable because:    [ x ] All other systems negative except as noted below    Constitutional:  [ ] fever [ ] chills  [ ] weight loss  [ ] weakness  Skin:  [ ] rash [ ] phlebitis	  Eyes: [ ] icterus [ ] pain  [ ] discharge	  ENMT: [ ] sore throat  [ ] thrush [ ] ulcers [ ] exudates  Respiratory: [ ] dyspnea [ ] hemoptysis [ ] cough [ ] sputum	  Cardiovascular:  [ ] chest pain [ ] palpitations [ ] edema	  Gastrointestinal:  [ ] nausea [ ] vomiting [ ] diarrhea [ ] constipation [ ] pain	  Genitourinary:  [ ] dysuria [ ] frequency [ ] hematuria [ ] discharge [ ] flank pain  [ ] incontinence  Musculoskeletal:  [ ] myalgias [ ] arthralgias [ ] arthritis  [ ] back pain  Neurological:  [ ] headache [ ] seizures  [ ] confusion/altered mental status    Allergies  codeine (Anaphylaxis)        ANTIMICROBIALS:  meropenem  IVPB 500 every 12 hours  nystatin    Suspension 982496 four times a day  valGANciclovir 450 <User Schedule>  vancomycin    Solution 125 every 6 hours      OTHER MEDS:  MEDICATIONS  (STANDING):  aspirin  chewable 81 daily  epoetin barry-epbx (RETACRIT) Injectable 49255 every 7 days  everolimus (ZORTRESS) 2 <User Schedule>  everolimus (ZORTRESS) 2 <User Schedule>  famotidine    Tablet 40 daily  heparin   Injectable 5000 every 12 hours  insulin glargine Injectable (LANTUS) 18 at bedtime  insulin lispro (ADMELOG) corrective regimen sliding scale  three times a day before meals  insulin lispro (ADMELOG) corrective regimen sliding scale  at bedtime  insulin lispro Injectable (ADMELOG) 7 three times a day before meals  NIFEdipine XL 30 daily  predniSONE   Tablet 5 two times a day  tamsulosin 0.4 at bedtime      Vital Signs Last 24 Hrs  T(C): 36.9 (24 Dec 2020 16:59), Max: 37 (24 Dec 2020 00:59)  T(F): 98.5 (24 Dec 2020 16:59), Max: 98.6 (24 Dec 2020 00:59)  HR: 78 (24 Dec 2020 16:59) (69 - 84)  BP: 169/79 (24 Dec 2020 16:59) (142/69 - 179/83)  BP(mean): 114 (24 Dec 2020 16:59) (105 - 119)  RR: 18 (24 Dec 2020 16:59) (18 - 20)  SpO2: 96% (24 Dec 2020 16:59) (93% - 100%)    PHYSICAL EXAMINATION:  General: Alert and Awake, NAD  HEENT: PERRL, EOMI  Neck: Supple  Cardiac: RRR, No M/R/G  Resp: CTAB, No Wh/Rh/Ra  Abdomen: NBS, NT/ND, No HSM, No rigidity or guarding  MSK: RLE foot with overlying dressing. No LE edema. No Calf tenderness  : No bob  Skin: No rashes or lesions. Skin is warm and dry to the touch.   Neuro: Alert and Awake. CN 2-12 Grossly intact. Moves all four extremities spontaneously.  Psych: Calm, Pleasant, Cooperative  Vasc: RIJ catheter (No surrounding erythema, drainage or tenderness to palpation)                        8.4    4.32  )-----------( 156      ( 24 Dec 2020 05:57 )             25.8       12-24    133<L>  |  96  |  70<H>  ----------------------------<  284<H>  4.7   |  22  |  4.92<H>    Ca    7.9<L>      24 Dec 2020 05:57  Phos  5.8     12-24  Mg     1.9     12-24    TPro  5.8<L>  /  Alb  2.7<L>  /  TBili  0.2  /  DBili  x   /  AST  30  /  ALT  27  /  AlkPhos  163<H>  12-24          MICROBIOLOGY:  v  .Blood Blood-Peripheral  12-20-20   No growth to date.  --  --      .Urine Catheterized  12-17-20   No growth  --  --      .Catheter PICC line tip  12-15-20   < 15 colonies Coag Negative Staphylococcus  --  --      .Urine Clean Catch (Midstream)  12-15-20   <10,000 CFU/mL Normal Urogenital Shantel  --  --      .Blood PICC/PERC Single Lumen  12-14-20   No Growth Final  --  --      .Tissue Other  11-25-20   Growth in fluid media only Staphylococcus epidermidis  "Susceptibilities not performed"  --  Staphylococcus epidermidis    Rapid RVP Result: NotDetec (12-20 @ 10:04)    RADIOLOGY:    <The imaging below has been reviewed and visualized by me independently. Findings as detailed in report below>    EXAM:  CT ABDOMEN AND PELVIS                        EXAM:  CT CHEST                        PROCEDURE DATE:  12/23/2020    1. Third spacing, as characterized by pleural effusions, anasarca, ascites.  2. Stable 5 mm stone in the distal left ureter without hydroureteronephrosis.  3. Scattered 1 to 2 mm nodules, new from 7/11/2020. Unless the patient is at high risk for malignancy, these do not merit follow-up.

## 2020-12-24 NOTE — PROGRESS NOTE ADULT - ASSESSMENT
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, CNI Neurotoxicity), IDDM, hyperlipidemia, obesity, HFpEF with mild LV diastolic dysfunction, MGUS who presented with fevers, diarrhea and abnormal labwork    Of note, recent admission for R Heel Osteomyelitis   s/p right foot heel incision and drainage w/ application of stravix and bone biopsy on 11/24  Superficial cultures with Pseudomonas and Operative Cultures with Coag Neg Staph in fluid media  Discharged on IV Cefepime with plan for 6 week course    On 12/14 labwork was noted to have MISA and Transaminitis  Of note [facility] outpatient labs from 12/9 with Cr of 1.41 and LFT's WNL    U/A (12/15, 12/17, 12/20), with pyuria  UCx (12/15, 12/17) with NGTD  COVID19 PCR (12/14) Negative  RVP / COVID19 PCR (12/17, 12/20) Negative  C diff toxin assay (12/15) indeterminate with Positive C diff PCR  CXR (12/15, 12/21) without pulmonary infiltrates  CT A/P (12/15) with distal left ureteral calculus without hydronephrosis and Diffuse wall thickening, slightly asymmetric on the left but without prostate abscess.   Renal US (12/16) with left collecting system fullness but no hydronephrosis    Curiously patient's diarrhea resolved in under 24 hours of PO Vancomycin therapy which does raise question if the PCR picked up colonization as opposed to true infection.   We will clearly have to cover with full course for C diff.    He was also noted to have pyuria on his U/A, with bladder wall thickening and some left ureteral prominence with nonobstructing calculus.   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   Now with no growth to date on his urine cultures.     The patient's heel appears without cellulitis or drainage so I doubt that this is the source of his fever.     With regards to his MISA etiology is also unclear - could be from AIN from Cefepime or alternate agent. Urine eosinophils were positive. He also had could have ATN from diarrhea and intravascular volume depletion at home but no significant hypotension here so this does bring ATN into question. Reasonable to avoid Cefepime at this time and cover with Meropenem (which would cover his previous ESBL isolates and the Pseudomonas from his osteomyelitis)    With regards to his persistent fever: unclear etiology at this point. He does not appear toxic and culture data is unrevealing. Possible it is noninfectious. He did have transaminitis on presentation and now only with alk phos elevation. Initially question of rejection was raised but with normalization of LFT's this appears less likely. Possible the AIN (if this is the cause of the MISA) is contributing to his fevers.     CT Chest (12/23) with small pulmonary nodules - doubt this is the cause of his fever  CT A/P (12/23) with persistent 5 mm left ureteral stone without hydronephrosis    No obvious etiology for fever/infection on the CT imaging unless the ureteral stone is source however, absence of hydronephrosis argues against this.     Overall, C diff infection, Fever, Transaminitis, MISA, Abnormal Urinalysis, Nephrolithiasis, Heel Osteomyelitis, Liver Transplant Recipient    # Fever / Transaminitis  - Recommend repeat BCx with next fever  - Recommend HHV6 Serum PCR, Tick Born Diseases Panel, Babesia Serum PCR  - Continue Meropenem 500 mg IV q24 hours (will need to increase to 500 mg IV Q12H if renal recovery)  - If clinical deterioration / instability recommend Daptomycin (Prior VRE infection post-transplant)  - Follow up on Adenovirus PCR, EBV PCR, Parvovirus PCR, CMV PCR     # C. diff Infection / Diarrhea  - Continue oral vancomycin 125 mg PO q6 hours (End Date: 12/28/20) followed by: Vancomycin 125 mg PO Q12H (while on Meropenem - End Date: 1/5/20)    # RLE Heel OM  - Continue Meropenem to 500 mg IV q12 hours (Tentative End Date: 1/5/2020 - 6 weeks of pseudomonas coverage for Pseudomonas)    # Acute kidney injury / Abnormal Urinalysis / Nephrolithiasis   - Continue Meropenem to 500 mg IV q12 hours; now off of Cefepime given ?AIN  - Management of MISA per transplant nephrology    #Liver Transplant Recipient  - Continue Valcyte 450mg PO TIW  - Bactrim on hold due to MISA - can consider adding Mepron    I will be away over this upcoming weekend. Please contact the Infectious Diseases Office with any further questions or concerns.     Eusebio Pérez M.D.  [facility] Division of Infectious Disease  8AM-5PM: Pager Number [phone]  After Hours (or if no response): Please contact the Infectious Diseases Office at [phone]     The above assessment and plan were discussed with transplant surgery team

## 2020-12-24 NOTE — PROGRESS NOTE ADULT - SUBJECTIVE AND OBJECTIVE BOX
Transplant Surgery - Multidisciplinary Rounds  --------------------------------------------------------------  s/p OLT 7/2/2020      Admitted 12/14/2020 with transaminitis, MISA, diarrhea    Present:  Patient seen and examined during AM rounds with multidisciplinary team including Transplant Surgeon: Dr. Chris/[facility], Transplant Nephrologist: Dr. FAY Patel, Inpatient Providers: Otilia, surgery resident Deric/ Tahira, unit RN. Disciplines not in attendance will be notified of the plan.      HPI: 65M with PMHx of IDDM, HLD, obesity, HFpEF with mild LV diastolic dysfunction, MGUS, chronic anemia with a history of duodenal ulcer 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  Underwent OLT on 7/2/2020, post op course c/b VRE bacteremia tx with linezolid and delirium likely in setting of CNI toxicity (FK discontinued/Everolimus initiated 7/27).    Recent admission from 11/20-12/2 for OM of the R heel, s/p debridement with Podiatry, and was discharged with Cefepime x 6 weeks via PICC line (placed 12/1). Initial wound cx on admission grew Pseudomonas, OR cx grew staph epi. Was also found with neutropenia and Valcyte was reduced (was on 900mg daily CMV +/-).      Sent to ED from clinic with rising transaminitis and MISA (SCr 3.3 from 1.5). Hospital course c/b C diff colitis and worsening renal function now requiring SICU care for uremic encephalopathy and fevers.    Interval Events:  - Afebrile overnight. on Lachelle/Vanco PO/Ofloxacin  - CT Abdomen/Pelvis noncontrast yesterday demonstrates mild pleural effusions and stable 5mm kidney stone without evidence of hydronephrosis  - Hg 8.4 this morning from 6.7 yesterday after transfusion of 2uprbc  - denies diarrhea, reports 2 BM overnight, continue course of po vanc for cdiff  - now AAOx3  - continues with non-oliguric renal function, s/p HD 12/22 with no fluid removal. Cr now 4.92 today from 4.73  - graft: transaminitis improving Tbili 0.2, , AST/ALT 30/23    Potential Discharge date: pending clinical improvement     Education:  Medications    Plan of care:  See Below        MEDICATIONS  (STANDING):  aspirin  chewable 81 milliGRAM(s) Oral daily  chlorhexidine 2% Cloths 1 Application(s) Topical <User Schedule>  epoetin barry-epbx (RETACRIT) Injectable 63028 Unit(s) IV Push every 7 days  everolimus (ZORTRESS) 2 milliGRAM(s) Oral <User Schedule>  everolimus (ZORTRESS) 2 milliGRAM(s) Oral <User Schedule>  heparin   Injectable 5000 Unit(s) SubCutaneous every 12 hours  insulin glargine Injectable (LANTUS) 15 Unit(s) SubCutaneous at bedtime  insulin lispro (ADMELOG) corrective regimen sliding scale   SubCutaneous three times a day before meals  insulin lispro (ADMELOG) corrective regimen sliding scale   SubCutaneous at bedtime  insulin lispro Injectable (ADMELOG) 5 Unit(s) SubCutaneous three times a day before meals  magnesium oxide 400 milliGRAM(s) Oral daily  meropenem  IVPB 500 milliGRAM(s) IV Intermittent <User Schedule>  nystatin    Suspension 379618 Unit(s) Oral four times a day  ofloxacin 0.3% Solution 10 Drop(s) Right Ear two times a day  pantoprazole    Tablet 40 milliGRAM(s) Oral before breakfast  predniSONE   Tablet 5 milliGRAM(s) Oral two times a day  sodium bicarbonate 1300 milliGRAM(s) Oral three times a day  tamsulosin 0.4 milliGRAM(s) Oral at bedtime  valGANciclovir 450 milliGRAM(s) Oral <User Schedule>  vancomycin    Solution 125 milliGRAM(s) Oral every 6 hours    MEDICATIONS  (PRN):      PAST MEDICAL & SURGICAL HISTORY:  GIB (gastrointestinal bleeding)    GERD with esophagitis  Gastritis &amp; Non Bleeding Ulcers    Hepatic encephalopathy    Obesity    Fatty liver disease, nonalcoholic    Renal stones  25 years ago    Hypertension    Neuropathy    Hypercholesteremia    Diabetes    S/P cholecystectomy              MEDICATIONS  (STANDING):  aspirin  chewable 81 milliGRAM(s) Oral daily  chlorhexidine 2% Cloths 1 Application(s) Topical <User Schedule>  epoetin barry-epbx (RETACRIT) Injectable 46927 Unit(s) IV Push every 7 days  everolimus (ZORTRESS) 2 milliGRAM(s) Oral <User Schedule>  everolimus (ZORTRESS) 2 milliGRAM(s) Oral <User Schedule>  famotidine    Tablet 40 milliGRAM(s) Oral daily  heparin   Injectable 5000 Unit(s) SubCutaneous every 12 hours  insulin glargine Injectable (LANTUS) 15 Unit(s) SubCutaneous at bedtime  insulin lispro (ADMELOG) corrective regimen sliding scale   SubCutaneous three times a day before meals  insulin lispro (ADMELOG) corrective regimen sliding scale   SubCutaneous at bedtime  insulin lispro Injectable (ADMELOG) 5 Unit(s) SubCutaneous three times a day before meals  magnesium oxide 400 milliGRAM(s) Oral daily  meropenem  IVPB 500 milliGRAM(s) IV Intermittent <User Schedule>  nystatin    Suspension 916593 Unit(s) Oral four times a day  ofloxacin 0.3% Solution 10 Drop(s) Right Ear two times a day  predniSONE   Tablet 5 milliGRAM(s) Oral two times a day  sodium bicarbonate 1300 milliGRAM(s) Oral three times a day  tamsulosin 0.4 milliGRAM(s) Oral at bedtime  valGANciclovir 450 milliGRAM(s) Oral <User Schedule>  vancomycin    Solution 125 milliGRAM(s) Oral every 6 hours    MEDICATIONS  (PRN):      PAST MEDICAL & SURGICAL HISTORY:  GIB (gastrointestinal bleeding)    GERD with esophagitis  Gastritis &amp; Non Bleeding Ulcers    Hepatic encephalopathy    Obesity    Fatty liver disease, nonalcoholic    Renal stones  25 years ago    Hypertension    Neuropathy    Hypercholesteremia    Diabetes    S/P cholecystectomy          MEDICATIONS  (STANDING):  aspirin  chewable 81 milliGRAM(s) Oral daily  chlorhexidine 2% Cloths 1 Application(s) Topical <User Schedule>  epoetin barry-epbx (RETACRIT) Injectable 29989 Unit(s) IV Push every 7 days  everolimus (ZORTRESS) 2 milliGRAM(s) Oral <User Schedule>  everolimus (ZORTRESS) 2 milliGRAM(s) Oral <User Schedule>  famotidine    Tablet 40 milliGRAM(s) Oral daily  heparin   Injectable 5000 Unit(s) SubCutaneous every 12 hours  insulin glargine Injectable (LANTUS) 15 Unit(s) SubCutaneous at bedtime  insulin lispro (ADMELOG) corrective regimen sliding scale   SubCutaneous three times a day before meals  insulin lispro (ADMELOG) corrective regimen sliding scale   SubCutaneous at bedtime  insulin lispro Injectable (ADMELOG) 5 Unit(s) SubCutaneous three times a day before meals  magnesium oxide 400 milliGRAM(s) Oral daily  meropenem  IVPB 500 milliGRAM(s) IV Intermittent every 12 hours  nystatin    Suspension 719738 Unit(s) Oral four times a day  ofloxacin 0.3% Solution 10 Drop(s) Right Ear two times a day  predniSONE   Tablet 5 milliGRAM(s) Oral two times a day  sodium bicarbonate 1300 milliGRAM(s) Oral three times a day  tamsulosin 0.4 milliGRAM(s) Oral at bedtime  valGANciclovir 450 milliGRAM(s) Oral <User Schedule>  vancomycin    Solution 125 milliGRAM(s) Oral every 6 hours    MEDICATIONS  (PRN):      PAST MEDICAL & SURGICAL HISTORY:  GIB (gastrointestinal bleeding)    GERD with esophagitis  Gastritis &amp; Non Bleeding Ulcers    Hepatic encephalopathy    Obesity    Fatty liver disease, nonalcoholic    Renal stones  25 years ago    Hypertension    Neuropathy    Hypercholesteremia    Diabetes    S/P cholecystectomy        Vital Signs Last 24 Hrs  T(C): 36.8 (24 Dec 2020 05:04), Max: 37.1 (23 Dec 2020 17:33)  T(F): 98.3 (24 Dec 2020 05:04), Max: 98.8 (23 Dec 2020 17:33)  HR: 76 (24 Dec 2020 05:04) (76 - 84)  BP: 179/83 (24 Dec 2020 05:04) (152/73 - 182/85)  BP(mean): 119 (24 Dec 2020 05:04) (105 - 122)  RR: 18 (24 Dec 2020 05:04) (18 - 20)  SpO2: 98% (24 Dec 2020 05:04) (93% - 100%)    I&O's Summary    23 Dec 2020 07:01  -  24 Dec 2020 07:00  --------------------------------------------------------  IN: 525 mL / OUT: 1025 mL / NET: -500 mL                              8.4    4.32  )-----------( 156      ( 24 Dec 2020 05:57 )             25.8     12-24    133<L>  |  96  |  70<H>  ----------------------------<  284<H>  4.7   |  22  |  4.92<H>    Ca    7.9<L>      24 Dec 2020 05:57  Phos  5.8     12-24  Mg     1.9     12-24    TPro  5.8<L>  /  Alb  2.7<L>  /  TBili  0.2  /  DBili  x   /  AST  30  /  ALT  27  /  AlkPhos  163<H>  12-24          Culture - Blood (collected 12-20-20 @ 11:52)  Source: .Blood Blood-Peripheral  Preliminary Report (12-21-20 @ 12:02):    No growth to date.    Culture - Blood (collected 12-20-20 @ 11:52)  Source: .Blood Blood-Peripheral  Preliminary Report (12-21-20 @ 12:02):    No growth to date.    Culture - Urine (collected 12-17-20 @ 20:08)  Source: .Urine Catheterized  Final Report (12-18-20 @ 15:59):    No growth                Review of systems  Gen: afebrile  Skin:  R heel wound   Head/Eyes/Ears/Mouth: No headache; Normal hearing; Normal vision w/o blurriness; No sinus pain/discomfort, sore throat  Respiratory: No dyspnea, cough, wheezing, hemoptysis  CV: No chest pain, PND, orthopnea  GI:  denies abdominal pain, diarrhea, constipation, nausea, vomiting, melena, hematochezia  : No increased frequency, dysuria, hematuria, nocturia  MSK: No joint pain/swelling; no back pain; no edema  Neuro: No dizziness/lightheadedness, weakness, seizures, numbness, tingling  Heme: No easy bruising or bleeding  Endo: No heat/cold intolerance  Psych: No significant nervousness, anxiety, stress, depression  All other systems were reviewed and are negative, except as noted.      PHYSICAL EXAM:  Constitutional: Well developed / well nourished  Eyes: Anicteric, PERRLA  ENMT: nc/at  Respiratory: CTA B/L  Cardiovascular: RRR, + heart murmur  Gastrointestinal: Soft, mild distention, non tender, incision well healed  Genitourinary: Voiding spontaneously  Extremities: SCD's in place and working bilaterally. no edema  Vascular: Palpable dp pulses bilaterally  Neurological: A&O x3  Skin: R heel ulcer: no drainage, no cellulitis  Musculoskeletal: Moving all extremities  Psychiatric: Responsive     Transplant Surgery - Multidisciplinary Rounds  --------------------------------------------------------------  s/p OLT 7/2/2020      Admitted 12/14/2020 with transaminitis, MISA, diarrhea    Present:  Patient seen and examined during AM rounds with multidisciplinary team including Transplant Surgeon: Dr. Chris/[facility], Transplant Nephrologist: Dr. FAY Patel, Inpatient Providers: Otilia, surgery resident Deric/ Tahira, unit RN. Disciplines not in attendance will be notified of the plan.      HPI: 65M with PMHx of IDDM, HLD, obesity, HFpEF with mild LV diastolic dysfunction, MGUS, chronic anemia with a history of duodenal ulcer 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  Underwent OLT on 7/2/2020, post op course c/b VRE bacteremia tx with linezolid and delirium likely in setting of CNI toxicity (FK discontinued/Everolimus initiated 7/27).    Recent admission from 11/20-12/2 for OM of the R heel, s/p debridement with Podiatry, and was discharged with Cefepime x 6 weeks via PICC line (placed 12/1). Initial wound cx on admission grew Pseudomonas, OR cx grew staph epi. Was also found with neutropenia and Valcyte was reduced (was on 900mg daily CMV +/-).      Sent to ED from clinic with rising transaminitis and MISA (SCr 3.3 from 1.5). Hospital course c/b C diff colitis and worsening renal function now requiring SICU care for uremic encephalopathy and fevers.    Interval Events:  -AAOX3 this morning, reports feeling well, denies any pain, denies N/V, denies chest pain/dyspnea  - Afebrile overnight. on Lachelle/Vanco PO/Ofloxacin  -Tolerating regular diet.   - CT Abdomen/Pelvis noncontrast yesterday demonstrates mild pleural effusions and stable 5mm kidney stone without evidence of hydronephrosis  - Hg 8.4 this morning from 6.7 yesterday after transfusion of 2uprbc  - denies diarrhea, reports 2 BM overnight, continue course of po vanc for cdiff  - continues with non-oliguric renal function, s/p HD 12/22 with no fluid removal. Cr now 4.92 today from 4.73  - graft: transaminitis improving Tbili 0.2, , AST/ALT 30/23    Potential Discharge date: pending clinical improvement     Education:  Medications    Plan of care:  See Below        MEDICATIONS  (STANDING):  aspirin  chewable 81 milliGRAM(s) Oral daily  chlorhexidine 2% Cloths 1 Application(s) Topical <User Schedule>  epoetin barry-epbx (RETACRIT) Injectable 50008 Unit(s) IV Push every 7 days  everolimus (ZORTRESS) 2 milliGRAM(s) Oral <User Schedule>  everolimus (ZORTRESS) 2 milliGRAM(s) Oral <User Schedule>  heparin   Injectable 5000 Unit(s) SubCutaneous every 12 hours  insulin glargine Injectable (LANTUS) 15 Unit(s) SubCutaneous at bedtime  insulin lispro (ADMELOG) corrective regimen sliding scale   SubCutaneous three times a day before meals  insulin lispro (ADMELOG) corrective regimen sliding scale   SubCutaneous at bedtime  insulin lispro Injectable (ADMELOG) 5 Unit(s) SubCutaneous three times a day before meals  magnesium oxide 400 milliGRAM(s) Oral daily  meropenem  IVPB 500 milliGRAM(s) IV Intermittent <User Schedule>  nystatin    Suspension 502987 Unit(s) Oral four times a day  ofloxacin 0.3% Solution 10 Drop(s) Right Ear two times a day  pantoprazole    Tablet 40 milliGRAM(s) Oral before breakfast  predniSONE   Tablet 5 milliGRAM(s) Oral two times a day  sodium bicarbonate 1300 milliGRAM(s) Oral three times a day  tamsulosin 0.4 milliGRAM(s) Oral at bedtime  valGANciclovir 450 milliGRAM(s) Oral <User Schedule>  vancomycin    Solution 125 milliGRAM(s) Oral every 6 hours    MEDICATIONS  (PRN):      PAST MEDICAL & SURGICAL HISTORY:  GIB (gastrointestinal bleeding)    GERD with esophagitis  Gastritis &amp; Non Bleeding Ulcers    Hepatic encephalopathy    Obesity    Fatty liver disease, nonalcoholic    Renal stones  25 years ago    Hypertension    Neuropathy    Hypercholesteremia    Diabetes    S/P cholecystectomy              MEDICATIONS  (STANDING):  aspirin  chewable 81 milliGRAM(s) Oral daily  chlorhexidine 2% Cloths 1 Application(s) Topical <User Schedule>  epoetin barry-epbx (RETACRIT) Injectable 21972 Unit(s) IV Push every 7 days  everolimus (ZORTRESS) 2 milliGRAM(s) Oral <User Schedule>  everolimus (ZORTRESS) 2 milliGRAM(s) Oral <User Schedule>  famotidine    Tablet 40 milliGRAM(s) Oral daily  heparin   Injectable 5000 Unit(s) SubCutaneous every 12 hours  insulin glargine Injectable (LANTUS) 15 Unit(s) SubCutaneous at bedtime  insulin lispro (ADMELOG) corrective regimen sliding scale   SubCutaneous three times a day before meals  insulin lispro (ADMELOG) corrective regimen sliding scale   SubCutaneous at bedtime  insulin lispro Injectable (ADMELOG) 5 Unit(s) SubCutaneous three times a day before meals  magnesium oxide 400 milliGRAM(s) Oral daily  meropenem  IVPB 500 milliGRAM(s) IV Intermittent <User Schedule>  nystatin    Suspension 676986 Unit(s) Oral four times a day  ofloxacin 0.3% Solution 10 Drop(s) Right Ear two times a day  predniSONE   Tablet 5 milliGRAM(s) Oral two times a day  sodium bicarbonate 1300 milliGRAM(s) Oral three times a day  tamsulosin 0.4 milliGRAM(s) Oral at bedtime  valGANciclovir 450 milliGRAM(s) Oral <User Schedule>  vancomycin    Solution 125 milliGRAM(s) Oral every 6 hours    MEDICATIONS  (PRN):      PAST MEDICAL & SURGICAL HISTORY:  GIB (gastrointestinal bleeding)    GERD with esophagitis  Gastritis &amp; Non Bleeding Ulcers    Hepatic encephalopathy    Obesity    Fatty liver disease, nonalcoholic    Renal stones  25 years ago    Hypertension    Neuropathy    Hypercholesteremia    Diabetes    S/P cholecystectomy          MEDICATIONS  (STANDING):  aspirin  chewable 81 milliGRAM(s) Oral daily  chlorhexidine 2% Cloths 1 Application(s) Topical <User Schedule>  epoetin barry-epbx (RETACRIT) Injectable 72037 Unit(s) IV Push every 7 days  everolimus (ZORTRESS) 2 milliGRAM(s) Oral <User Schedule>  everolimus (ZORTRESS) 2 milliGRAM(s) Oral <User Schedule>  famotidine    Tablet 40 milliGRAM(s) Oral daily  heparin   Injectable 5000 Unit(s) SubCutaneous every 12 hours  insulin glargine Injectable (LANTUS) 15 Unit(s) SubCutaneous at bedtime  insulin lispro (ADMELOG) corrective regimen sliding scale   SubCutaneous three times a day before meals  insulin lispro (ADMELOG) corrective regimen sliding scale   SubCutaneous at bedtime  insulin lispro Injectable (ADMELOG) 5 Unit(s) SubCutaneous three times a day before meals  magnesium oxide 400 milliGRAM(s) Oral daily  meropenem  IVPB 500 milliGRAM(s) IV Intermittent every 12 hours  nystatin    Suspension 193530 Unit(s) Oral four times a day  ofloxacin 0.3% Solution 10 Drop(s) Right Ear two times a day  predniSONE   Tablet 5 milliGRAM(s) Oral two times a day  sodium bicarbonate 1300 milliGRAM(s) Oral three times a day  tamsulosin 0.4 milliGRAM(s) Oral at bedtime  valGANciclovir 450 milliGRAM(s) Oral <User Schedule>  vancomycin    Solution 125 milliGRAM(s) Oral every 6 hours    MEDICATIONS  (PRN):      PAST MEDICAL & SURGICAL HISTORY:  GIB (gastrointestinal bleeding)    GERD with esophagitis  Gastritis &amp; Non Bleeding Ulcers    Hepatic encephalopathy    Obesity    Fatty liver disease, nonalcoholic    Renal stones  25 years ago    Hypertension    Neuropathy    Hypercholesteremia    Diabetes    S/P cholecystectomy        Vital Signs Last 24 Hrs  T(C): 36.8 (24 Dec 2020 05:04), Max: 37.1 (23 Dec 2020 17:33)  T(F): 98.3 (24 Dec 2020 05:04), Max: 98.8 (23 Dec 2020 17:33)  HR: 76 (24 Dec 2020 05:04) (76 - 84)  BP: 179/83 (24 Dec 2020 05:04) (152/73 - 182/85)  BP(mean): 119 (24 Dec 2020 05:04) (105 - 122)  RR: 18 (24 Dec 2020 05:04) (18 - 20)  SpO2: 98% (24 Dec 2020 05:04) (93% - 100%)    I&O's Summary    23 Dec 2020 07:01  -  24 Dec 2020 07:00  --------------------------------------------------------  IN: 525 mL / OUT: 1025 mL / NET: -500 mL                              8.4    4.32  )-----------( 156      ( 24 Dec 2020 05:57 )             25.8     12-24    133<L>  |  96  |  70<H>  ----------------------------<  284<H>  4.7   |  22  |  4.92<H>    Ca    7.9<L>      24 Dec 2020 05:57  Phos  5.8     12-24  Mg     1.9     12-24    TPro  5.8<L>  /  Alb  2.7<L>  /  TBili  0.2  /  DBili  x   /  AST  30  /  ALT  27  /  AlkPhos  163<H>  12-24          Culture - Blood (collected 12-20-20 @ 11:52)  Source: .Blood Blood-Peripheral  Preliminary Report (12-21-20 @ 12:02):    No growth to date.    Culture - Blood (collected 12-20-20 @ 11:52)  Source: .Blood Blood-Peripheral  Preliminary Report (12-21-20 @ 12:02):    No growth to date.    Culture - Urine (collected 12-17-20 @ 20:08)  Source: .Urine Catheterized  Final Report (12-18-20 @ 15:59):    No growth                Review of systems  Gen: afebrile  Skin:  R heel wound   Head/Eyes/Ears/Mouth: No headache; Normal hearing; Normal vision w/o blurriness; No sinus pain/discomfort, sore throat  Respiratory: No dyspnea, cough, wheezing, hemoptysis  CV: No chest pain, PND, orthopnea  GI:  denies abdominal pain, diarrhea, constipation, nausea, vomiting, melena, hematochezia  : No increased frequency, dysuria, hematuria, nocturia  MSK: No joint pain/swelling; no back pain; no edema  Neuro: No dizziness/lightheadedness, weakness, seizures, numbness, tingling  Heme: No easy bruising or bleeding  Endo: No heat/cold intolerance  Psych: No significant nervousness, anxiety, stress, depression  All other systems were reviewed and are negative, except as noted.      PHYSICAL EXAM:  Constitutional: Well developed / well nourished  Eyes: Anicteric, PERRLA  ENMT: nc/at  Respiratory: CTA B/L  Cardiovascular: RRR, + heart murmur  Gastrointestinal: Soft, mild distention, non tender, incision well healed  Genitourinary: Voiding spontaneously  Extremities: SCD's in place and working bilaterally. no edema  Vascular: Palpable dp pulses bilaterally  Neurological: A&O x3  Skin: R heel ulcer: no drainage, no cellulitis  Musculoskeletal: Moving all extremities  Psychiatric: Responsive

## 2020-12-24 NOTE — PROGRESS NOTE ADULT - SUBJECTIVE AND OBJECTIVE BOX
[facility] DIVISION OF KIDNEY DISEASES AND HYPERTENSION -- FOLLOW UP NOTE  --------------------------------------------------------------------------------  Chief Complaint:    24 hour events/subjective:  Patient was seen and examined at bedside  UOP was 1L in the last 24 hrs and Cr 4.9 today   afebrile in the last 24 hrs     PAST HISTORY  --------------------------------------------------------------------------------  No significant changes to PMH, PSH, FHx, SHx, unless otherwise noted    ALLERGIES & MEDICATIONS  --------------------------------------------------------------------------------  Allergies    codeine (Anaphylaxis)    Intolerances      Standing Inpatient Medications  aspirin  chewable 81 milliGRAM(s) Oral daily  chlorhexidine 2% Cloths 1 Application(s) Topical <User Schedule>  epoetin barry-epbx (RETACRIT) Injectable 03340 Unit(s) IV Push every 7 days  everolimus (ZORTRESS) 2 milliGRAM(s) Oral <User Schedule>  everolimus (ZORTRESS) 2 milliGRAM(s) Oral <User Schedule>  famotidine    Tablet 40 milliGRAM(s) Oral daily  heparin   Injectable 5000 Unit(s) SubCutaneous every 12 hours  insulin glargine Injectable (LANTUS) 18 Unit(s) SubCutaneous at bedtime  insulin lispro (ADMELOG) corrective regimen sliding scale   SubCutaneous three times a day before meals  insulin lispro (ADMELOG) corrective regimen sliding scale   SubCutaneous at bedtime  insulin lispro Injectable (ADMELOG) 7 Unit(s) SubCutaneous three times a day before meals  magnesium oxide 400 milliGRAM(s) Oral daily  meropenem  IVPB 500 milliGRAM(s) IV Intermittent every 12 hours  nystatin    Suspension 087492 Unit(s) Oral four times a day  ofloxacin 0.3% Solution 10 Drop(s) Right Ear two times a day  predniSONE   Tablet 5 milliGRAM(s) Oral two times a day  sodium bicarbonate 1300 milliGRAM(s) Oral three times a day  tamsulosin 0.4 milliGRAM(s) Oral at bedtime  valGANciclovir 450 milliGRAM(s) Oral <User Schedule>  vancomycin    Solution 125 milliGRAM(s) Oral every 6 hours    PRN Inpatient Medications      REVIEW OF SYSTEMS  --------------------------------------------------------------------------------  Gen: No fatigue, fevers/chills, weakness  Skin: No rashes  Head/Eyes/Ears/Mouth: No headache;No sore throat  Respiratory: No dyspnea, cough,   CV: No chest pain, PND, orthopnea  GI: No abdominal pain, diarrhea, constipation, nausea, vomiting  Transplant: No pain  : No increased frequency, dysuria, hematuria, nocturia  MSK: No joint pain/swelling; no back pain; no edema  Neuro: No dizziness/lightheadedness, weakness, seizures, numbness, tingling  Psych: No significant nervousness, anxiety, stress, depression    All other systems were reviewed and are negative, except as noted.    VITALS/PHYSICAL EXAM  --------------------------------------------------------------------------------  T(C): 36.8 (12-24-20 @ 09:00), Max: 37.1 (12-23-20 @ 17:33)  HR: 69 (12-24-20 @ 09:00) (69 - 84)  BP: 142/69 (12-24-20 @ 09:00) (142/69 - 182/85)  RR: 18 (12-24-20 @ 09:00) (18 - 20)  SpO2: 97% (12-24-20 @ 09:00) (93% - 100%)  Wt(kg): --        12-23-20 @ 07:01  -  12-24-20 @ 07:00  --------------------------------------------------------  IN: 525 mL / OUT: 1025 mL / NET: -500 mL    12-24-20 @ 07:01  -  12-24-20 @ 12:55  --------------------------------------------------------  IN: 360 mL / OUT: 550 mL / NET: -190 mL      Physical Exam:  	Gen: NAD  	HEENT: PERRL, supple neck, clear oropharynx  	Pulm: CTA B/L  	CV: RRR, S1S2; no rub  	Back: No spinal or CVA tenderness; no sacral edema  	Abd: +BS, soft, nontender/nondistended                      Transplant: No tenderness, swelling  	: No suprapubic tenderness  	UE: Warm, FROM; no edema; no asterixis  	LE: Warm, FROM; no edema  	Neuro: No focal deficits  	Psych: Normal affect and mood  	Skin: Warm, without rashes      LABS/STUDIES  --------------------------------------------------------------------------------              8.4    4.32  >-----------<  156      [12-24-20 @ 05:57]              25.8     133  |  96  |  70  ----------------------------<  284      [12-24-20 @ 05:57]  4.7   |  22  |  4.92        Ca     7.9     [12-24-20 @ 05:57]      Mg     1.9     [12-24-20 @ 05:57]      Phos  5.8     [12-24-20 @ 05:57]    TPro  5.8  /  Alb  2.7  /  TBili  0.2  /  DBili  x   /  AST  30  /  ALT  27  /  AlkPhos  163  [12-24-20 @ 05:57]    PT/INR: PT 12.6 , INR 1.05       [12-24-20 @ 05:57]  PTT: 28.1       [12-24-20 @ 05:57]      Creatinine Trend:  SCr 4.92 [12-24 @ 05:57]  SCr 4.73 [12-23 @ 05:43]  SCr 5.55 [12-22 @ 00:05]  SCr 5.45 [12-20 @ 21:44]  SCr 5.62 [12-20 @ 06:05]          Urinalysis - [12-20-20 @ 10:18]      Color Yellow / Appearance Slightly Turbid / SG 1.010 / pH 6.0      Gluc 500 mg/dL / Ketone Negative  / Bili Negative / Urobili Negative       Blood Moderate / Protein 100 / Leuk Est Large / Nitrite Negative      RBC 9 /  / Hyaline 0 / Gran  / Sq Epi  / Non Sq Epi 0 / Bacteria Few    Urine Protein 126      [12-17-20 @ 14:14]    Iron 160, TIBC Unable to calculate Test Repeated, %sat Unable to calculate Test Repeated      [06-12-20 @ 09:04]  Ferritin 493      [04-29-20 @ 08:20]  HbA1c 6.4      [02-12-20 @ 17:08]  TSH 3.26      [04-26-20 @ 09:47]  Lipid: chol 158, , HDL 44, LDL 93      [08-05-20 @ 08:57]    HBsAg Nonreact      [12-19-20 @ 14:40]  HCV 0.06, Nonreact      [12-19-20 @ 14:40]    C3 Complement 158      [12-17-20 @ 16:43]  C4 Complement 38      [12-17-20 @ 16:43]

## 2020-12-24 NOTE — PROGRESS NOTE ADULT - SUBJECTIVE AND OBJECTIVE BOX
[facility]  Ramón Morales PA-C  [address] [place] [address]  [phone]      INTERVAL HPI/OVERNIGHT EVENTS:    patient seen and examined  events noted        MEDICATIONS  (STANDING):  aspirin  chewable 81 milliGRAM(s) Oral daily  chlorhexidine 2% Cloths 1 Application(s) Topical daily  epoetin barry-epbx (RETACRIT) Injectable 09474 Unit(s) SubCutaneous every 7 days  everolimus (ZORTRESS) 1.5 milliGRAM(s) Oral <User Schedule>  heparin   Injectable 5000 Unit(s) SubCutaneous every 12 hours  insulin glargine Injectable (LANTUS) 10 Unit(s) SubCutaneous at bedtime  insulin lispro (ADMELOG) corrective regimen sliding scale   SubCutaneous three times a day before meals  insulin lispro (ADMELOG) corrective regimen sliding scale   SubCutaneous at bedtime  insulin lispro Injectable (ADMELOG) 5 Unit(s) SubCutaneous three times a day before meals  insulin lispro Injectable (ADMELOG) 2 Unit(s) SubCutaneous once  magnesium oxide 400 milliGRAM(s) Oral daily  meropenem  IVPB 500 milliGRAM(s) IV Intermittent every 24 hours  nystatin    Suspension 942390 Unit(s) Oral four times a day  ofloxacin 0.3% Solution 10 Drop(s) Right Ear two times a day  pantoprazole    Tablet 40 milliGRAM(s) Oral before breakfast  predniSONE   Tablet 5 milliGRAM(s) Oral two times a day  sodium bicarbonate 1300 milliGRAM(s) Oral three times a day  sodium bicarbonate  Infusion 0.05 mEq/kG/Hr (75 mL/Hr) IV Continuous <Continuous>  tamsulosin 0.4 milliGRAM(s) Oral at bedtime  valGANciclovir 450 milliGRAM(s) Oral <User Schedule>  vancomycin    Solution 125 milliGRAM(s) Oral every 6 hours    MEDICATIONS  (PRN):      Allergies    codeine (Anaphylaxis)    Intolerances        ROS:   General:  No wt loss, +fevers, chills, night sweats, + fatigue,   Eyes:  Good vision, no reported pain  ENT:  No sore throat, pain, runny nose, dysphagia  CV:  No pain, palpitations, hypo/hypertension  Resp:  No dyspnea, cough, tachypnea, wheezing  GI:  No pain, No nausea, No vomiting, No diarrhea, No constipation, No weight loss, No fever, No pruritis, No rectal bleeding, No tarry stools, No dysphagia,  :  No pain, bleeding, incontinence, nocturia  Muscle:  No pain, weakness  Neuro:  No weakness, tingling, memory problems  Psych:  No fatigue, insomnia, mood problems, depression  Endocrine:  No polyuria, polydipsia, cold/heat intolerance  Heme:  No petechiae, ecchymosis, easy bruisability  Skin:  No rash, tattoos, scars, edema      PHYSICAL EXAM:   Vital Signs:  Vital Signs Last 24 Hrs  T(C): 36.5 (19 Dec 2020 15:00), Max: 37.2 (19 Dec 2020 04:00)  T(F): 97.7 (19 Dec 2020 15:00), Max: 99 (19 Dec 2020 04:00)  HR: 75 (19 Dec 2020 15:00) (75 - 91)  BP: 148/68 (19 Dec 2020 15:00) (118/81 - 163/70)  BP(mean): 95 (19 Dec 2020 15:00) (90 - 106)  RR: 20 (19 Dec 2020 15:00) (16 - 20)  SpO2: 96% (19 Dec 2020 15:00) (92% - 98%)  Daily     Daily Weight in k.8 (18 Dec 2020 20:35)    GENERAL:  no distress  HEENT:  NC/AT  ABDOMEN:  Soft, non-tender, non-distended, normoactive bowel sounds  EXTEREMITIES:  no cyanosis,clubbing or edema  SKIN:  No rash/erythema/ecchymoses/petechiae/wounds/abscess/warm/dry  NEURO:  Alert, oriented, no asterixis, no tremor, no encephalopathy      LABS:                        7.4    4.50  )-----------( 179      ( 19 Dec 2020 05:29 )             22.9     12-    133<L>  |  101  |  92<H>  ----------------------------<  224<H>  4.7   |  16<L>  |  6.94<H>    Ca    8.2<L>      19 Dec 2020 05:29  Phos  4.6       Mg     2.2         TPro  5.8<L>  /  Alb  2.6<L>  /  TBili  0.1<L>  /  DBili  x   /  AST  31  /  ALT  47<H>  /  AlkPhos  222<H>  -    PT/INR - ( 19 Dec 2020 05:29 )   PT: 13.1 sec;   INR: 1.10 ratio         PTT - ( 19 Dec 2020 05:29 )  PTT:26.1 sec  Urinalysis Basic - ( 17 Dec 2020 16:54 )    Color: Light Yellow / Appearance: Slightly Turbid / S.012 / pH: x  Gluc: x / Ketone: Negative  / Bili: Negative / Urobili: Negative   Blood: x / Protein: 100 mg/dL / Nitrite: Negative   Leuk Esterase: Large / RBC: 3 /hpf /  /HPF   Sq Epi: x / Non Sq Epi: 0 /hpf / Bacteria: Negative        RADIOLOGY & ADDITIONAL TESTS:

## 2020-12-24 NOTE — PROGRESS NOTE ADULT - ASSESSMENT
65 yr old man with liver transplant in July 2020 on everolimus and steroids, cellcept on hold due to infection. Right plantar osteo s/p debridement , was on IV cefepime for ~2 weeks s/p graft, appears clean and no signs of infection. Admitted with MISA. Serum creatinine was 1.3-1.5mg/dL on previous admission few weeks ago.     1. Non Oliguric MISA likely due to AIN from cefepime vs ATN in the setting of sepsis- U/A with leukocytes on admission but cultures remain negative.   Non obstructive left distal ureteric stone on CT. U/S with enlarged kidneys, no olga hydronephrosis.  Plan -will hold off dialysis today . Lasix 40 mg IV once today     2. Fever, now resolved. C diff infection, AIN, Heel Osteomyelitis- Appreciate ID recs. Currently on PO Vancomycin and Meropenam.  3. Anemia - continue retacrit 10,000 units sq weekly   4. Metabolic acidosis - Continue sodium bicarb 1300mg po TID   5.  Liver Transplant Recipient- managed by [facility] surgery and hepatology

## 2020-12-24 NOTE — PROGRESS NOTE ADULT - ATTENDING COMMENTS
Mr. 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    # Fevers: Afebrile again for the past 24h. Overnight non-contrast CTs were reassuring. Low suspicion for uncontrolled infection. Continuing meropenem for OM (previously switched from cefepime due to concern for possible AIN). Fevers more likely were non-infectious, due to AIN.    # MISA: S/p HD 12/19 and 12/22. Concern for ATN vs cefepime- or PPI-related AIN. Cefepime last received 12/14 and PPI discontinued 12/23. Urine output remains good. Deferring renal biopsy for now. Deferring high-dose steroids for possible AIN for now given steroid-related risks given his immunosuppression, recent C diff colitis, hyperglycemia, and history of delirium. Will continue to monitor for renal recovery.    # C diff colitis: Symptoms resolved, apart from fevers (as above). Continuing oral vancomycin (12/15- ).    # R calcaneous osteomyelitis: No cellulitis on exam. Previously switched from cefepime to meropenem (12/15- ) due to concern for AIN, as above.    # Post-DDLT: Liver enzymes improved since admission. Currently on prednisone 5 mg po bid. MMF held. Continuing everolimus 2 mg po q12h and will adjust dose by trough levels. Remains on Valcyte for CMV prophylaxis and CMV PCR was negative (12/16). Bactrim held due to MISA.    # Hypertension: Receiving Lasix 40 mg iv x1 today per Transplant Nephrology and will also start nifedipine 30 mg po daily.    # IDDM2: Insulin regimen adjusted today for control of hyperglycemia.    Plan of care discussed at multidisciplinary rounds at bedside with Transplant Surgery and Transplant Nephrology.  Please don't hesitate to call with any questions or concerns.    Letitia Mo M.D., Ph.D.  Transplant Hepatology  Cell: [phone]

## 2020-12-24 NOTE — PROGRESS NOTE ADULT - SUBJECTIVE AND OBJECTIVE BOX
Chief Complaint:  Patient is a 65y old  Male who presents with a chief complaint of transaminitis (24 Dec 2020 07:50)      Interval Events: No overnight events. No further fevers. No n/v, abdominal pain, fever, chills or foot pain.    Allergies:  codeine (Anaphylaxis)      Hospital Medications:  aspirin  chewable 81 milliGRAM(s) Oral daily  chlorhexidine 2% Cloths 1 Application(s) Topical <User Schedule>  epoetin barry-epbx (RETACRIT) Injectable 32276 Unit(s) IV Push every 7 days  everolimus (ZORTRESS) 2 milliGRAM(s) Oral <User Schedule>  everolimus (ZORTRESS) 2 milliGRAM(s) Oral <User Schedule>  famotidine    Tablet 40 milliGRAM(s) Oral daily  heparin   Injectable 5000 Unit(s) SubCutaneous every 12 hours  insulin glargine Injectable (LANTUS) 18 Unit(s) SubCutaneous at bedtime  insulin lispro (ADMELOG) corrective regimen sliding scale   SubCutaneous three times a day before meals  insulin lispro (ADMELOG) corrective regimen sliding scale   SubCutaneous at bedtime  insulin lispro Injectable (ADMELOG) 7 Unit(s) SubCutaneous three times a day before meals  magnesium oxide 400 milliGRAM(s) Oral daily  meropenem  IVPB 500 milliGRAM(s) IV Intermittent every 12 hours  nystatin    Suspension 928090 Unit(s) Oral four times a day  ofloxacin 0.3% Solution 10 Drop(s) Right Ear two times a day  predniSONE   Tablet 5 milliGRAM(s) Oral two times a day  sodium bicarbonate 1300 milliGRAM(s) Oral three times a day  tamsulosin 0.4 milliGRAM(s) Oral at bedtime  valGANciclovir 450 milliGRAM(s) Oral <User Schedule>  vancomycin    Solution 125 milliGRAM(s) Oral every 6 hours      PMHX/PSHX:  GIB (gastrointestinal bleeding)    GERD with esophagitis    Hepatic encephalopathy    Obesity    Fatty liver disease, nonalcoholic    Renal stones    Hypertension    Neuropathy    Hypercholesteremia    Diabetes    S/P cholecystectomy    No significant past surgical history        Family history:  Family history of type 2 diabetes mellitus    Family history of hypertension    Family history of stomach cancer    No pertinent family history in first degree relatives        ROS: As per HPI, 14-point ROS negative otherwise.    General:  No wt loss, fevers, chills, night sweats, fatigue,   Eyes:  Good vision, no reported pain  ENT:  No sore throat, pain, runny nose, dysphagia  CV:  No pain, palpitations, hypo/hypertension  Resp:  No dyspnea, cough, tachypnea, wheezing  GI:  See HPI  :  No pain, bleeding, incontinence, nocturia  Muscle:  No pain, weakness  Neuro:  No weakness, tingling, memory problems  Psych:  No fatigue, insomnia, mood problems, depression  Endocrine:  No polyuria, polydipsia, cold/heat intolerance  Heme:  No petechiae, ecchymosis, easy bruisability  Skin:  No rash, edema      PHYSICAL EXAM:     Vital Signs:  Vital Signs Last 24 Hrs  T(C): 36.8 (24 Dec 2020 09:00), Max: 37.1 (23 Dec 2020 17:33)  T(F): 98.2 (24 Dec 2020 09:00), Max: 98.8 (23 Dec 2020 17:33)  HR: 69 (24 Dec 2020 09:00) (69 - 84)  BP: 142/69 (24 Dec 2020 09:00) (142/69 - 182/85)  BP(mean): 119 (24 Dec 2020 05:04) (105 - 122)  RR: 18 (24 Dec 2020 09:00) (18 - 20)  SpO2: 97% (24 Dec 2020 09:00) (93% - 100%)  Daily     Daily Weight in k.5 (24 Dec 2020 05:04)    GENERAL:  appears comfortable, no acute distress  HEENT:  NC/AT,  conjunctivae clear, sclera -anicteric  CHEST:  no increased effort  HEART:  Regular rate and rhythm  ABDOMEN:  Soft, non-tender, non-distended,  no masses ,no hepato-splenomegaly,   EXTREMITIES:  no cyanosis, clubbing or edema  SKIN:  R foot wraped in dressing  NEURO:  Alert, oriented    LABS:                        8.4    4.32  )-----------( 156      ( 24 Dec 2020 05:57 )             25.8     12-24    133<L>  |  96  |  70<H>  ----------------------------<  284<H>  4.7   |  22  |  4.92<H>    Ca    7.9<L>      24 Dec 2020 05:57  Phos  5.8     12-24  Mg     1.9     12-24    TPro  5.8<L>  /  Alb  2.7<L>  /  TBili  0.2  /  DBili  x   /  AST  30  /  ALT  27  /  AlkPhos  163<H>  12-24    LIVER FUNCTIONS - ( 24 Dec 2020 05:57 )  Alb: 2.7 g/dL / Pro: 5.8 g/dL / ALK PHOS: 163 U/L / ALT: 27 U/L / AST: 30 U/L / GGT: x           PT/INR - ( 24 Dec 2020 05:57 )   PT: 12.6 sec;   INR: 1.05 ratio         PTT - ( 24 Dec 2020 05:57 )  PTT:28.1 sec        Imaging:  < from: CT Abdomen and Pelvis No Cont (12.23.20 @ 18:39) >  IMPRESSION:  1. Third spacing, as characterized by pleural effusions, anasarca, ascites.  2. Stable 5 mm stone in the distal left ureter without hydroureteronephrosis.  3. Scattered 1 to 2 mm nodules, new from 2020. Unless the patient is at high risk for malignancy, these do not merit follow-up.    < end of copied text >

## 2020-12-24 NOTE — PROGRESS NOTE ADULT - ASSESSMENT
65M with PMHx of IDDM, HLD, obesity, HFpEF with mild LV diastolic dysfunction, MGUS, chronic anemia with a history of duodenal ulcer 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  Underwent OLT on 7/2/2020, post op coure c/b VRE bacteremia tx with linezolid and delirium likely in setting of CNI toxicity (FK discontinued/Everolimus initiated 7/27).    Recent admission from 11/20-12/2 for OM of the R heel, s/p debridement with Podiatry, and was discharged with Cefepime x 6 weeks via PICC line (placed 12/1).     Initial wound cx on admission grew Pseudomonas, OR cx grew staph epi.  He now presents with from clinic rising transaminitis and MISA (SCr 3.3 from 1.5)    Hospital course c/b C. Diff colitis and worsening renal function, requiring SICU care    [] ID:     - C. Diff colitis: continue Vanco PO; diarrhea resolved since yesterday. will hold off on Flagyl   - R heel OM: s/p debridement by podiatry last admission; wound healing, no signs on infection.   - F/U cultures from 12/20.  cultures negative so far.  continue Lachelle given h/o ESBL E.Coli. Fungitell, repeat BCx pending  - CMV +/- :  Valcyte 450mg BIW (renally adjusted), CMV PCR neg from 12/15. will send repeat weekly, next due 12/22  - R ear otitis externa with chronic hearing loss: Ofloxacin per ENT  - TTE w/o obvious evidence of endocarditis     [] non-oliguric MISA   - Worsening renal function with associated uremic encephelopathy, improving today, Creatinine 4.73 (from 5.55)  - Shiley placed 12/19, s/p HD 12/19.  DDAVP x1 due to shiley site oozing, HD today with no fluid removal today   - Anemia:  Procrit 10K sq weekly    [] s/p OLT with transaminitis  - Everolimus 2/2, MMF on hold since last admission in setting of OM; Pred 5/5  - PPx: Valcyte renally dosed, off Bactrim  - Liver doppler with patent vessels   - LFTs trending down    [] Heme:   -Hg 6.7 (from 7.5) today. pending transfusion of 1uprbc today  -continue EPO  -send retic count    [] DM  - diabetic diet  - tight glycemic control via Lantus/Lispro    [] Dispo:   - appreciate SICU care   65M with PMHx of IDDM, HLD, obesity, HFpEF with mild LV diastolic dysfunction, MGUS, chronic anemia with a history of duodenal ulcer 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  Underwent OLT on 7/2/2020, post op coure c/b VRE bacteremia tx with linezolid and delirium likely in setting of CNI toxicity (FK discontinued/Everolimus initiated 7/27).    Recent admission from 11/20-12/2 for OM of the R heel, s/p debridement with Podiatry, and was discharged with Cefepime x 6 weeks via PICC line (placed 12/1).     Initial wound cx on admission grew Pseudomonas, OR cx grew staph epi.  He now presents with from clinic rising transaminitis and MISA (SCr 3.3 from 1.5)    Hospital course c/b C. Diff colitis and worsening renal function, requiring SICU care    [] ID:   -Afebrile for past 24 hr, CT C/A/P noncontrast yesterday demonstrates mild pleural effusions and stable 5mm kidney stone without evidence of hydronephrosis  - C. Diff colitis: continue Vanco PO; diarrhea improving. will hold off on Flagyl   - R heel OM: s/p debridement by podiatry last admission; wound healing, no signs on infection.   - F/U cultures from 12/20.  cultures negative so far.  continue Lachelle given h/o ESBL E.Coli. Fungitell, repeat BCx pending  - CMV +/- :  Valcyte 450mg BIW (renally adjusted), CMV PCR neg from 12/15. will send repeat weekly,   - R ear otitis externa with chronic hearing loss: Ofloxacin per ENT  - TTE w/o obvious evidence of endocarditis     [] non-oliguric MISA   - Worsening renal function with associated uremic encephelopathy, Creatinine 4.92 (from 4.73). Will administer 40mg IV Lasix today.  - Shiley placed 12/19, s/p HD 12/19.  DDAVP x1 due to shiley site oozing, HD today with no fluid removal today   - Anemia:  Procrit 10K sq weekly    [] s/p OLT with transaminitis  - Everolimus 2/2, MMF on hold since last admission in setting of OM; Pred 5/5  - PPx: Valcyte renally dosed, off Bactrim  - Liver doppler with patent vessels   - LFTs trending down    [] Heme:   -Hg 6.7 (from 7.5) today. pending transfusion of 1uprbc today  -continue EPO  -send retic count    [] DM  - diabetic diet  - tight glycemic control via Lantus/Lispro. FS in 250-280s overnight, Lantus increased to 18 today and premeal increased to 7    [] Dispo:   - appreciate SICU care

## 2020-12-24 NOTE — PROGRESS NOTE ADULT - ATTENDING COMMENTS
No fevers.  CT AP with anasarca, no clear infectious nidus. On abx   Diarrhea has resolved.   Appreciate ID input.   Cr roughly stable, will continue to closely monitor.  No HD needed today.   Immuno: everolimus/pred.

## 2020-12-25 LAB
ALBUMIN SERPL ELPH-MCNC: 3 G/DL — LOW (ref 3.3–5)
ALP SERPL-CCNC: 148 U/L — HIGH (ref 40–120)
ALT FLD-CCNC: 27 U/L — SIGNIFICANT CHANGE UP (ref 10–45)
ANION GAP SERPL CALC-SCNC: 17 MMOL/L — SIGNIFICANT CHANGE UP (ref 5–17)
APTT BLD: 29.5 SEC — SIGNIFICANT CHANGE UP (ref 27.5–35.5)
AST SERPL-CCNC: 25 U/L — SIGNIFICANT CHANGE UP (ref 10–40)
BASOPHILS # BLD AUTO: 0.02 K/UL — SIGNIFICANT CHANGE UP (ref 0–0.2)
BASOPHILS NFR BLD AUTO: 0.4 % — SIGNIFICANT CHANGE UP (ref 0–2)
BILIRUB SERPL-MCNC: 0.2 MG/DL — SIGNIFICANT CHANGE UP (ref 0.2–1.2)
BUN SERPL-MCNC: 69 MG/DL — HIGH (ref 7–23)
CALCIUM SERPL-MCNC: 8.4 MG/DL — SIGNIFICANT CHANGE UP (ref 8.4–10.5)
CHLORIDE SERPL-SCNC: 98 MMOL/L — SIGNIFICANT CHANGE UP (ref 96–108)
CMV DNA CSF QL NAA+PROBE: SIGNIFICANT CHANGE UP
CO2 SERPL-SCNC: 23 MMOL/L — SIGNIFICANT CHANGE UP (ref 22–31)
CREAT SERPL-MCNC: 4.92 MG/DL — HIGH (ref 0.5–1.3)
CULTURE RESULTS: SIGNIFICANT CHANGE UP
EOSINOPHIL # BLD AUTO: 0.04 K/UL — SIGNIFICANT CHANGE UP (ref 0–0.5)
EOSINOPHIL NFR BLD AUTO: 0.9 % — SIGNIFICANT CHANGE UP (ref 0–6)
EVEROLIMUS, WHOLE BLOOD RESULT: 4.5 NG/ML — SIGNIFICANT CHANGE UP (ref 3–8)
GLUCOSE BLDC GLUCOMTR-MCNC: 139 MG/DL — HIGH (ref 70–99)
GLUCOSE BLDC GLUCOMTR-MCNC: 171 MG/DL — HIGH (ref 70–99)
GLUCOSE BLDC GLUCOMTR-MCNC: 190 MG/DL — HIGH (ref 70–99)
GLUCOSE BLDC GLUCOMTR-MCNC: 251 MG/DL — HIGH (ref 70–99)
GLUCOSE SERPL-MCNC: 195 MG/DL — HIGH (ref 70–99)
HCT VFR BLD CALC: 26.7 % — LOW (ref 39–50)
HGB BLD-MCNC: 8.6 G/DL — LOW (ref 13–17)
IMM GRANULOCYTES NFR BLD AUTO: 0.9 % — SIGNIFICANT CHANGE UP (ref 0–1.5)
INR BLD: 1.04 RATIO — SIGNIFICANT CHANGE UP (ref 0.88–1.16)
LYMPHOCYTES # BLD AUTO: 0.73 K/UL — LOW (ref 1–3.3)
LYMPHOCYTES # BLD AUTO: 15.8 % — SIGNIFICANT CHANGE UP (ref 13–44)
MAGNESIUM SERPL-MCNC: 2 MG/DL — SIGNIFICANT CHANGE UP (ref 1.6–2.6)
MCHC RBC-ENTMCNC: 29.1 PG — SIGNIFICANT CHANGE UP (ref 27–34)
MCHC RBC-ENTMCNC: 32.2 GM/DL — SIGNIFICANT CHANGE UP (ref 32–36)
MCV RBC AUTO: 90.2 FL — SIGNIFICANT CHANGE UP (ref 80–100)
MONOCYTES # BLD AUTO: 0.39 K/UL — SIGNIFICANT CHANGE UP (ref 0–0.9)
MONOCYTES NFR BLD AUTO: 8.4 % — SIGNIFICANT CHANGE UP (ref 2–14)
NEUTROPHILS # BLD AUTO: 3.41 K/UL — SIGNIFICANT CHANGE UP (ref 1.8–7.4)
NEUTROPHILS NFR BLD AUTO: 73.6 % — SIGNIFICANT CHANGE UP (ref 43–77)
NRBC # BLD: 0 /100 WBCS — SIGNIFICANT CHANGE UP (ref 0–0)
PHOSPHATE SERPL-MCNC: 6 MG/DL — HIGH (ref 2.5–4.5)
PLATELET # BLD AUTO: 164 K/UL — SIGNIFICANT CHANGE UP (ref 150–400)
POTASSIUM SERPL-MCNC: 4.5 MMOL/L — SIGNIFICANT CHANGE UP (ref 3.5–5.3)
POTASSIUM SERPL-SCNC: 4.5 MMOL/L — SIGNIFICANT CHANGE UP (ref 3.5–5.3)
PROT SERPL-MCNC: 5.7 G/DL — LOW (ref 6–8.3)
PROTHROM AB SERPL-ACNC: 12.4 SEC — SIGNIFICANT CHANGE UP (ref 10.6–13.6)
RBC # BLD: 2.96 M/UL — LOW (ref 4.2–5.8)
RBC # FLD: 13.6 % — SIGNIFICANT CHANGE UP (ref 10.3–14.5)
SODIUM SERPL-SCNC: 138 MMOL/L — SIGNIFICANT CHANGE UP (ref 135–145)
SPECIMEN SOURCE: SIGNIFICANT CHANGE UP
WBC # BLD: 4.63 K/UL — SIGNIFICANT CHANGE UP (ref 3.8–10.5)
WBC # FLD AUTO: 4.63 K/UL — SIGNIFICANT CHANGE UP (ref 3.8–10.5)

## 2020-12-25 PROCEDURE — 99233 SBSQ HOSP IP/OBS HIGH 50: CPT

## 2020-12-25 PROCEDURE — 99232 SBSQ HOSP IP/OBS MODERATE 35: CPT | Mod: GC

## 2020-12-25 PROCEDURE — 99232 SBSQ HOSP IP/OBS MODERATE 35: CPT

## 2020-12-25 RX ADMIN — Medication 500000 UNIT(S): at 05:02

## 2020-12-25 RX ADMIN — Medication 125 MILLIGRAM(S): at 00:23

## 2020-12-25 RX ADMIN — Medication 2: at 12:50

## 2020-12-25 RX ADMIN — Medication 81 MILLIGRAM(S): at 12:51

## 2020-12-25 RX ADMIN — Medication 10 DROP(S): at 05:02

## 2020-12-25 RX ADMIN — Medication 1: at 22:09

## 2020-12-25 RX ADMIN — FAMOTIDINE 40 MILLIGRAM(S): 10 INJECTION INTRAVENOUS at 12:51

## 2020-12-25 RX ADMIN — Medication 5 MILLIGRAM(S): at 17:23

## 2020-12-25 RX ADMIN — Medication 1300 MILLIGRAM(S): at 22:09

## 2020-12-25 RX ADMIN — Medication 7 UNIT(S): at 12:50

## 2020-12-25 RX ADMIN — Medication 5 MILLIGRAM(S): at 05:02

## 2020-12-25 RX ADMIN — Medication 125 MILLIGRAM(S): at 17:22

## 2020-12-25 RX ADMIN — Medication 1300 MILLIGRAM(S): at 05:03

## 2020-12-25 RX ADMIN — HEPARIN SODIUM 5000 UNIT(S): 5000 INJECTION INTRAVENOUS; SUBCUTANEOUS at 05:03

## 2020-12-25 RX ADMIN — Medication 7 UNIT(S): at 08:33

## 2020-12-25 RX ADMIN — Medication 30 MILLIGRAM(S): at 05:02

## 2020-12-25 RX ADMIN — MEROPENEM 100 MILLIGRAM(S): 1 INJECTION INTRAVENOUS at 17:22

## 2020-12-25 RX ADMIN — EVEROLIMUS 2 MILLIGRAM(S): 10 TABLET ORAL at 08:32

## 2020-12-25 RX ADMIN — Medication 1300 MILLIGRAM(S): at 12:51

## 2020-12-25 RX ADMIN — Medication 7 UNIT(S): at 17:21

## 2020-12-25 RX ADMIN — Medication 125 MILLIGRAM(S): at 12:50

## 2020-12-25 RX ADMIN — INSULIN GLARGINE 18 UNIT(S): 100 INJECTION, SOLUTION SUBCUTANEOUS at 22:10

## 2020-12-25 RX ADMIN — CHLORHEXIDINE GLUCONATE 1 APPLICATION(S): 213 SOLUTION TOPICAL at 06:47

## 2020-12-25 RX ADMIN — EVEROLIMUS 2 MILLIGRAM(S): 10 TABLET ORAL at 20:18

## 2020-12-25 RX ADMIN — Medication 500000 UNIT(S): at 00:23

## 2020-12-25 RX ADMIN — HEPARIN SODIUM 5000 UNIT(S): 5000 INJECTION INTRAVENOUS; SUBCUTANEOUS at 17:22

## 2020-12-25 RX ADMIN — Medication 125 MILLIGRAM(S): at 05:02

## 2020-12-25 RX ADMIN — MAGNESIUM OXIDE 400 MG ORAL TABLET 400 MILLIGRAM(S): 241.3 TABLET ORAL at 12:51

## 2020-12-25 RX ADMIN — Medication 500000 UNIT(S): at 12:50

## 2020-12-25 RX ADMIN — Medication 2: at 08:32

## 2020-12-25 RX ADMIN — TAMSULOSIN HYDROCHLORIDE 0.4 MILLIGRAM(S): 0.4 CAPSULE ORAL at 22:09

## 2020-12-25 RX ADMIN — Medication 500000 UNIT(S): at 17:22

## 2020-12-25 RX ADMIN — MEROPENEM 100 MILLIGRAM(S): 1 INJECTION INTRAVENOUS at 05:03

## 2020-12-25 NOTE — PROGRESS NOTE ADULT - SUBJECTIVE AND OBJECTIVE BOX
Interval Events:   2 bm overnight - soft consistency   - afebrile, stable VSS. Last episode of fever on  with neg bld cx to date  - Renal function: SCr plateaued ~4.9, required HD x 2  and , u/o improving 2.1L, received lasix 40mg IV x 1 dose yesterday  - BP better controlled; started on Nifedipine XL 30mg daily yesterday    Hospital Medications:  aspirin  chewable 81 milliGRAM(s) Oral daily  chlorhexidine 2% Cloths 1 Application(s) Topical <User Schedule>  epoetin barry-epbx (RETACRIT) Injectable 62807 Unit(s) IV Push every 7 days  everolimus (ZORTRESS) 2 milliGRAM(s) Oral <User Schedule>  everolimus (ZORTRESS) 2 milliGRAM(s) Oral <User Schedule>  famotidine    Tablet 40 milliGRAM(s) Oral daily  heparin   Injectable 5000 Unit(s) SubCutaneous every 12 hours  insulin glargine Injectable (LANTUS) 18 Unit(s) SubCutaneous at bedtime  insulin lispro (ADMELOG) corrective regimen sliding scale   SubCutaneous three times a day before meals  insulin lispro (ADMELOG) corrective regimen sliding scale   SubCutaneous at bedtime  insulin lispro Injectable (ADMELOG) 7 Unit(s) SubCutaneous three times a day before meals  magnesium oxide 400 milliGRAM(s) Oral daily  meropenem  IVPB 500 milliGRAM(s) IV Intermittent every 12 hours  NIFEdipine XL 30 milliGRAM(s) Oral daily  nystatin    Suspension 200523 Unit(s) Oral four times a day  predniSONE   Tablet 5 milliGRAM(s) Oral two times a day  sodium bicarbonate 1300 milliGRAM(s) Oral three times a day  tamsulosin 0.4 milliGRAM(s) Oral at bedtime  valGANciclovir 450 milliGRAM(s) Oral <User Schedule>  vancomycin    Solution 125 milliGRAM(s) Oral every 6 hours        ROS:   General:  No fevers, chills or night sweats  ENT:  No sore throat or dysphagia  CV:  No pain or palpitations  Resp:  No dyspnea, cough or  wheezing  GI:  as above  Skin:  No rash or edema  Neuro: no weakness   Hematologic: no bleeding  Musculoskeletal: no muscle pain or join pain  Psych: no agitation      PHYSICAL EXAM:   Vital Signs:  Vital Signs Last 24 Hrs  T(C): 36.6 (25 Dec 2020 05:00), Max: 37.3 (24 Dec 2020 21:30)  T(F): 97.9 (25 Dec 2020 05:00), Max: 99.1 (24 Dec 2020 21:30)  HR: 73 (25 Dec 2020 05:00) (69 - 86)  BP: 138/69 (25 Dec 2020 05:00) (127/60 - 169/79)  BP(mean): 114 (24 Dec 2020 16:59) (114 - 114)  RR: 18 (25 Dec 2020 05:00) (18 - 18)  SpO2: 100% (25 Dec 2020 05:00) (96% - 100%)  Daily     Daily Weight in k.6 (25 Dec 2020 05:00)    Constitutional: Well developed / well nourished  Eyes: Anicteric, PERRLA  ENMT: nc/at  Respiratory: CTA B/L  Cardiovascular: RRR, + heart murmur  Gastrointestinal: Soft, mild distention, non tender, incision well healed  Genitourinary: Voiding spontaneously  Extremities: SCD's in place and working bilaterally. no edema  Vascular: Palpable dp pulses bilaterally  Neurological: A&O x3  Skin: R heel ulcer: no drainage, no cellulitis  Musculoskeletal: Moving all extremities  Psychiatric: Responsive    LABS:                        8.6    4.63  )-----------( 164      ( 25 Dec 2020 06:26 )             26.7     Mean Cell Volume: 90.2 fl (-20 @ 06:26)        138  |  98  |  69<H>  ----------------------------<  195<H>  4.5   |  23  |  4.92<H>    Ca    8.4      25 Dec 2020 06:26  Phos  6.0       Mg     2.0         TPro  5.7<L>  /  Alb  3.0<L>  /  TBili  0.2  /  DBili  x   /  AST  25  /  ALT  27  /  AlkPhos  148<H>  12    LIVER FUNCTIONS - ( 25 Dec 2020 06:26 )  Alb: 3.0 g/dL / Pro: 5.7 g/dL / ALK PHOS: 148 U/L / ALT: 27 U/L / AST: 25 U/L / GGT: x           PT/INR - ( 25 Dec 2020 06:27 )   PT: 12.4 sec;   INR: 1.04 ratio         PTT - ( 25 Dec 2020 06:27 )  PTT:29.5 sec                            8.6    4.63  )-----------( 164      ( 25 Dec 2020 06:26 )             26.7                         8.4    4.32  )-----------( 156      ( 24 Dec 2020 05:57 )             25.8                         6.7    3.43  )-----------( 151      ( 23 Dec 2020 05:43 )             21.4       Imaging:

## 2020-12-25 NOTE — PROGRESS NOTE ADULT - ASSESSMENT
65 year old man type 2 diabetes, HLD, GERD, HFpEF with mild LV diastolic dysfunction, and decompensated JEAN cirrhosis, duodenal ulcer, GAVE and duodenal AVM s/p APC (last on 10/11/19) and SHENG who is now s/p liver transplant (7/2/20) with post-op course c/b tacrolimus toxicity and hospital delirium improving after stopping CNI and replacing w/ everolimus. Patient recently presented 11/20/20 with worsening LE swelling and right foot wound with wound culture 11/24 growing S. epidermidis and calcaneus bone biopsy culture 11/24 demonstrating osteomyelitis sent home with cefepime. Patient now with sepsis and MISA likely secondary to cdiff. Patient having low grade fever.      Impression:   # Fever: afebrile for the last 48 hours. ( last fever 12/22) CT scan unrevealing for source. Possible secondary to AIN. Underlying infection (bladder source?) is on differential. Has cdiff which is resolving. Possible osteo though low suspicion from ID and podiatry teams as patient was on targeted antibiotics with clinical appearance of a healing wound.    # Cdiff: on po vanco since 12/15. Continues to improved from symptom standpoint. WBC wnl.     # MISA with c/f uremia: SCr plateaued ~4.9, required HD x 2 12/19 and 12/22, u/o improving 2.1L, received lasix 40mg IV x 1 dose yesterday Kidney slightly enlarged on u/s with labs and clinical picture consistent with AIN although cannot exclude acute tubular necrosis. FeNa and FeUrea suggest possible AIN (from cefepime)    # Right foot osteomyelitis:  reviewed by podiatry, appears stable. s/p surgical debridement and graft placement on 11/24 by Podiatry.   Wound culture grew pseudomonas and bone bx on 11/24 growing S. epidermidis - likely contaminant. Had PICC line, now removed. On meropenem    # Otitis externa: on floxin drops    # S/p liver transplant 7/2/20: Transaminases and INR mildly elevated likely in setting of infection.     Recipient Info:   ABO: A  CMV:  Neg  EBV Positive  Donor:  Donor ID:  [id number] Match: [id number]  Age: 29 ABO:  A1 High Risk:   No COD: Cardiovascular  Anti CMV positive, EBV IgG- positive  HepBcAb-neg, Hepatitis C-DANA- neg, Hepatitis C ab-neg  # Fever - unclear etiology, infectious workup ongoing.  # Bladder wall thickening  # Type 2 diabetes  # SHENG     Recommendations:  - continue with po vanco, meropenum, and floxin drops per ENT  - strict glycemic control, increase insulin per primary transplant team  - strict I/Os  - immunosuppressives with everolimus and prednisone  - appreciate transplant ID, renal, surgery reccs  - hold bactrim given MISA  - monitor CBC, CMP, INR  - transplant hepatology to follow    Zana Sorensen   Gastroenterology Fellow  Pager: [phone]  Please call answering service [phone] / on-call GI fellow after 5pm and before 8am, and on weekends.

## 2020-12-25 NOTE — PROGRESS NOTE ADULT - SUBJECTIVE AND OBJECTIVE BOX
Transplant Surgery - Multidisciplinary Rounds  --------------------------------------------------------------  s/p OLT 7/2/2020      Admitted 12/14/2020 with transaminitis, MISA, diarrhea    Present:  Patient seen and examined during AM rounds with multidisciplinary team including Transplant Surgeon: Dr. Garcia, Transplant Nephrologist: Dr. FAY Patel, Inpatient Providers: Otilia, surgery resident Tahira, unit RN. Disciplines not in attendance will be notified of the plan.      HPI: 65M with PMHx of IDDM, HLD, obesity, HFpEF with mild LV diastolic dysfunction, MGUS, chronic anemia with a history of duodenal ulcer 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  Underwent OLT on 7/2/2020, post op course c/b VRE bacteremia tx with linezolid and delirium likely in setting of CNI toxicity (FK discontinued/Everolimus initiated 7/27).    Recent admission from 11/20-12/2 for OM of the R heel, s/p debridement with Podiatry, and was discharged with Cefepime x 6 weeks via PICC line (placed 12/1). Initial wound cx on admission grew Pseudomonas, OR cx grew staph epi. Was also found with neutropenia and Valcyte was reduced (was on 900mg daily CMV +/-).      Sent to ED from clinic with rising transaminitis and MISA (SCr 3.3 from 1.5). Hospital course c/b C diff colitis and worsening renal function requiring HD.     Interval Events:  - afebrile, stable VSS. Last episode of fever on 12/22 with neg bld cx to date  - Renal function: SCr plateaued ~4.9, required HD x 2 12/19 and 12/22, u/o improving 2.1L, received lasix 40mg IV x 1 dose yesterday  - BP better controlled; started on Nifedipine XL 30mg daily yesterday  - LFTs stable     Potential Discharge date: pending clinical improvement     Education:  Medications    Plan of care:  See Below    MEDICATIONS  (STANDING):  aspirin  chewable 81 milliGRAM(s) Oral daily  chlorhexidine 2% Cloths 1 Application(s) Topical <User Schedule>  epoetin barry-epbx (RETACRIT) Injectable 03852 Unit(s) IV Push every 7 days  everolimus (ZORTRESS) 2 milliGRAM(s) Oral <User Schedule>  everolimus (ZORTRESS) 2 milliGRAM(s) Oral <User Schedule>  famotidine    Tablet 40 milliGRAM(s) Oral daily  heparin   Injectable 5000 Unit(s) SubCutaneous every 12 hours  insulin glargine Injectable (LANTUS) 18 Unit(s) SubCutaneous at bedtime  insulin lispro (ADMELOG) corrective regimen sliding scale   SubCutaneous three times a day before meals  insulin lispro (ADMELOG) corrective regimen sliding scale   SubCutaneous at bedtime  insulin lispro Injectable (ADMELOG) 7 Unit(s) SubCutaneous three times a day before meals  magnesium oxide 400 milliGRAM(s) Oral daily  meropenem  IVPB 500 milliGRAM(s) IV Intermittent every 12 hours  NIFEdipine XL 30 milliGRAM(s) Oral daily  nystatin    Suspension 370521 Unit(s) Oral four times a day  predniSONE   Tablet 5 milliGRAM(s) Oral two times a day  sodium bicarbonate 1300 milliGRAM(s) Oral three times a day  tamsulosin 0.4 milliGRAM(s) Oral at bedtime  valGANciclovir 450 milliGRAM(s) Oral <User Schedule>  vancomycin    Solution 125 milliGRAM(s) Oral every 6 hours    PAST MEDICAL & SURGICAL HISTORY:  GIB (gastrointestinal bleeding)  GERD with esophagitis  Gastritis &amp; Non Bleeding Ulcers  Hepatic encephalopathy  Obesity  Fatty liver disease, nonalcoholic  Renal stones 25 years ago  Hypertension  Neuropathy  Hypercholesteremia  Diabetes  S/P cholecystectomy    Vital Signs Last 24 Hrs  T(C): 36.6 (25 Dec 2020 05:00), Max: 37.3 (24 Dec 2020 21:30)  T(F): 97.9 (25 Dec 2020 05:00), Max: 99.1 (24 Dec 2020 21:30)  HR: 73 (25 Dec 2020 05:00) (69 - 86)  BP: 138/69 (25 Dec 2020 05:00) (127/60 - 169/79)  BP(mean): 114 (24 Dec 2020 16:59) (114 - 114)  RR: 18 (25 Dec 2020 05:00) (18 - 18)  SpO2: 100% (25 Dec 2020 05:00) (96% - 100%)    I&O's Summary    24 Dec 2020 07:01  -  25 Dec 2020 07:00  --------------------------------------------------------  IN: 1420 mL / OUT: 2170 mL / NET: -750 mL                         8.6    4.63  )-----------( 164      ( 25 Dec 2020 06:26 )             26.7     12-25    138  |  98  |  69<H>  ----------------------------<  195<H>  4.5   |  23  |  4.92<H>    Ca    8.4      25 Dec 2020 06:26  Phos  6.0     12-25  Mg     2.0     12-25    TPro  5.7<L>  /  Alb  3.0<L>  /  TBili  0.2  /  DBili  x   /  AST  25  /  ALT  27  /  AlkPhos  148<H>  12-25    Culture - Blood (collected 12-20-20 @ 11:52)  Source: .Blood Blood-Peripheral  Preliminary Report (12-21-20 @ 12:02):    No growth to date.    Culture - Blood (collected 12-20-20 @ 11:52)  Source: .Blood Blood-Peripheral  Preliminary Report (12-21-20 @ 12:02):    No growth to date.    Review of systems  Gen: afebrile  Skin:  R heel wound   Head/Eyes/Ears/Mouth: No headache; Normal hearing; Normal vision w/o blurriness; No sinus pain/discomfort, sore throat  Respiratory: No dyspnea, cough, wheezing, hemoptysis  CV: No chest pain, PND, orthopnea  GI:  denies abdominal pain, diarrhea, constipation, nausea, vomiting, melena, hematochezia  : No increased frequency, dysuria, hematuria, nocturia  MSK: No joint pain/swelling; no back pain; no edema  Neuro: No dizziness/lightheadedness, weakness, seizures, numbness, tingling  Heme: No easy bruising or bleeding  Endo: No heat/cold intolerance  Psych: No significant nervousness, anxiety, stress, depression  All other systems were reviewed and are negative, except as noted.      PHYSICAL EXAM:  Constitutional: Well developed / well nourished  Eyes: Anicteric, PERRLA  ENMT: nc/at  Respiratory: CTA B/L  Cardiovascular: RRR, + heart murmur  Gastrointestinal: Soft, mild distention, non tender, incision well healed  Genitourinary: Voiding spontaneously  Extremities: SCD's in place and working bilaterally. no edema  Vascular: Palpable dp pulses bilaterally  Neurological: A&O x3  Skin: R heel ulcer: no drainage, no cellulitis  Musculoskeletal: Moving all extremities  Psychiatric: Responsive

## 2020-12-25 NOTE — PROGRESS NOTE ADULT - ASSESSMENT
65M with PMHx of IDDM, HLD, obesity, HFpEF with mild LV diastolic dysfunction, MGUS, chronic anemia with a history of duodenal ulcer 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  Underwent OLT on 7/2/2020, post op coure c/b VRE bacteremia tx with linezolid and delirium likely in setting of CNI toxicity (FK discontinued/Everolimus initiated 7/27).    Recent admission from 11/20-12/2 for OM of the R heel, s/p debridement with Podiatry, and was discharged with Cefepime x 6 weeks via PICC line (placed 12/1).     Initial wound cx on admission grew Pseudomonas, OR cx grew staph epi.  He now presents with from clinic rising transaminitis and MISA (SCr 3.3 from 1.5)    Hospital course c/b C. Diff colitis and worsening renal function, requiring HD    [] ID:   - Afebrile past 24hrs, bld/urine cxs negative to date  - C. Diff colitis: continue Vanco PO q6hr until 12/28, then decreased to bid while on meropenem (end date 1/5)  - R heel OM: s/p debridement by podiatry last admission; wound healing, no signs on infection, continue meropenem until 1/5   - CMV +/- :  Valcyte 450mg TIW (renally adjusted), CMV PCR neg from 12/15. will send repeat weekly,   - R ear otitis externa with chronic hearing loss: Ofloxacin per ENT  - TTE w/o obvious evidence of endocarditis     [] non-oliguric MISA   - Worsening renal function with associated uremic encephelopathy, requiring HD (12/19 and 12/22)   - SCr plateaued  ~4.9, BERNICE vick in place   - Anemia:  Procrit 10K sq weekly    [] s/p OLT with transaminitis  - Everolimus 2/2, MMF on hold since last admission in setting of OM; Pred 5/5  - PPx: Valcyte renally dosed, off Bactrim  - Liver doppler with patent vessels   - LFTs stable    [] DM  - diabetic diet  - tight glycemic control via Lantus/Lispro. FS in 250-280s overnight, Lantus increased to 18 today and premeal increased to 7

## 2020-12-25 NOTE — PROGRESS NOTE ADULT - SUBJECTIVE AND OBJECTIVE BOX
[facility] DIVISION OF KIDNEY DISEASES AND HYPERTENSION -- FOLLOW UP NOTE  --------------------------------------------------------------------------------  Chief Complaint:    24 hour events/subjective:  Patient was seen and examined at bedside  UOP was 2.1 L in the last 24 hrs and Cr is stable at 4.9     PAST HISTORY  --------------------------------------------------------------------------------  No significant changes to PMH, PSH, FHx, SHx, unless otherwise noted    ALLERGIES & MEDICATIONS  --------------------------------------------------------------------------------  Allergies    codeine (Anaphylaxis)    Intolerances      Standing Inpatient Medications  aspirin  chewable 81 milliGRAM(s) Oral daily  chlorhexidine 2% Cloths 1 Application(s) Topical <User Schedule>  epoetin barry-epbx (RETACRIT) Injectable 96195 Unit(s) IV Push every 7 days  everolimus (ZORTRESS) 2 milliGRAM(s) Oral <User Schedule>  everolimus (ZORTRESS) 2 milliGRAM(s) Oral <User Schedule>  famotidine    Tablet 40 milliGRAM(s) Oral daily  heparin   Injectable 5000 Unit(s) SubCutaneous every 12 hours  insulin glargine Injectable (LANTUS) 18 Unit(s) SubCutaneous at bedtime  insulin lispro (ADMELOG) corrective regimen sliding scale   SubCutaneous three times a day before meals  insulin lispro (ADMELOG) corrective regimen sliding scale   SubCutaneous at bedtime  insulin lispro Injectable (ADMELOG) 7 Unit(s) SubCutaneous three times a day before meals  magnesium oxide 400 milliGRAM(s) Oral daily  meropenem  IVPB 500 milliGRAM(s) IV Intermittent every 12 hours  NIFEdipine XL 30 milliGRAM(s) Oral daily  nystatin    Suspension 068831 Unit(s) Oral four times a day  predniSONE   Tablet 5 milliGRAM(s) Oral two times a day  sodium bicarbonate 1300 milliGRAM(s) Oral three times a day  tamsulosin 0.4 milliGRAM(s) Oral at bedtime  valGANciclovir 450 milliGRAM(s) Oral <User Schedule>  vancomycin    Solution 125 milliGRAM(s) Oral every 6 hours    PRN Inpatient Medications      REVIEW OF SYSTEMS  --------------------------------------------------------------------------------  Gen: No fatigue, fevers/chills, weakness  Skin: No rashes  Head/Eyes/Ears/Mouth: No headache;No sore throat  Respiratory: No dyspnea, cough,   CV: No chest pain, PND, orthopnea  GI: No abdominal pain, diarrhea, constipation, nausea, vomiting  Transplant: No pain  : No increased frequency, dysuria, hematuria, nocturia  MSK: No joint pain/swelling; no back pain; no edema  Neuro: No dizziness/lightheadedness, weakness, seizures, numbness, tingling  Psych: No significant nervousness, anxiety, stress, depression    All other systems were reviewed and are negative, except as noted.    VITALS/PHYSICAL EXAM  --------------------------------------------------------------------------------  T(C): 36.9 (12-25-20 @ 09:00), Max: 37.3 (12-24-20 @ 21:30)  HR: 84 (12-25-20 @ 09:00) (73 - 86)  BP: 119/59 (12-25-20 @ 09:00) (119/59 - 169/79)  RR: 18 (12-25-20 @ 09:00) (18 - 18)  SpO2: 97% (12-25-20 @ 09:00) (96% - 100%)  Wt(kg): --        12-24-20 @ 07:01  -  12-25-20 @ 07:00  --------------------------------------------------------  IN: 1420 mL / OUT: 2170 mL / NET: -750 mL    12-25-20 @ 07:01  -  12-25-20 @ 10:23  --------------------------------------------------------  IN: 240 mL / OUT: 350 mL / NET: -110 mL      Physical Exam:  	Gen: NAD  	HEENT: PERRL, supple neck, clear oropharynx  	Pulm: CTA B/L  	CV: RRR, S1S2; no rub  	Back: No spinal or CVA tenderness; no sacral edema  	Abd: +BS, soft, nontender/nondistended                      Transplant: No tenderness, swelling  	: No suprapubic tenderness  	UE: Warm, FROM; no edema; no asterixis  	LE: Warm, FROM; no edema  	Neuro: No focal deficits  	Psych: Normal affect and mood  	Skin: Warm, without rashes      LABS/STUDIES  --------------------------------------------------------------------------------              8.6    4.63  >-----------<  164      [12-25-20 @ 06:26]              26.7     138  |  98  |  69  ----------------------------<  195      [12-25-20 @ 06:26]  4.5   |  23  |  4.92        Ca     8.4     [12-25-20 @ 06:26]      Mg     2.0     [12-25-20 @ 06:26]      Phos  6.0     [12-25-20 @ 06:26]    TPro  5.7  /  Alb  3.0  /  TBili  0.2  /  DBili  x   /  AST  25  /  ALT  27  /  AlkPhos  148  [12-25-20 @ 06:26]    PT/INR: PT 12.4 , INR 1.04       [12-25-20 @ 06:27]  PTT: 29.5       [12-25-20 @ 06:27]      Creatinine Trend:  SCr 4.92 [12-25 @ 06:26]  SCr 4.92 [12-24 @ 05:57]  SCr 4.73 [12-23 @ 05:43]  SCr 5.55 [12-22 @ 00:05]  SCr 5.45 [12-20 @ 21:44]            Urinalysis - [12-20-20 @ 10:18]      Color Yellow / Appearance Slightly Turbid / SG 1.010 / pH 6.0      Gluc 500 mg/dL / Ketone Negative  / Bili Negative / Urobili Negative       Blood Moderate / Protein 100 / Leuk Est Large / Nitrite Negative      RBC 9 /  / Hyaline 0 / Gran  / Sq Epi  / Non Sq Epi 0 / Bacteria Few      Iron 160, TIBC Unable to calculate Test Repeated, %sat Unable to calculate Test Repeated      [06-12-20 @ 09:04]  Ferritin 493      [04-29-20 @ 08:20]  HbA1c 6.4      [02-12-20 @ 17:08]  TSH 3.26      [04-26-20 @ 09:47]  Lipid: chol 158, , HDL 44, LDL 93      [08-05-20 @ 08:57]    HBsAg Nonreact      [12-19-20 @ 14:40]  HCV 0.06, Nonreact      [12-19-20 @ 14:40]    C3 Complement 158      [12-17-20 @ 16:43]  C4 Complement 38      [12-17-20 @ 16:43]

## 2020-12-25 NOTE — PROGRESS NOTE ADULT - ASSESSMENT
65 yr old man with liver transplant in July 2020 on everolimus and steroids, cellcept on hold due to infection. Right plantar osteo s/p debridement , was on IV cefepime for ~2 weeks s/p graft, appears clean and no signs of infection. Admitted with MISA. Serum creatinine was 1.3-1.5mg/dL on previous admission few weeks ago.     1. Non Oliguric MISA likely due to AIN from cefepime vs ATN in the setting of sepsis- U/A with leukocytes on admission but cultures remain negative.   Non obstructive left distal ureteric stone on CT. U/S with enlarged kidneys, no olga hydronephrosis.  Plan -will  hold off dialysis     2. Fever, now resolved. C diff infection, AIN, Heel Osteomyelitis- Appreciate ID recs. Currently on PO Vancomycin and Meropenam.  3. Anemia - continue retacrit 10,000 units sq weekly   4. Metabolic acidosis - Continue sodium bicarb 1300mg po TID   5.  Liver Transplant Recipient- managed by [facility] surgery and hepatology

## 2020-12-25 NOTE — PROGRESS NOTE ADULT - SUBJECTIVE AND OBJECTIVE BOX
INFECTIOUS DISEASES FOLLOW UP-- Briseida Menezes  [phone]    This is a follow up note for this  65yMale with  High liver transaminase level    patient reports feeling better- eating        ROS:  CONSTITUTIONAL:  No fever, good appetite  CARDIOVASCULAR:  No chest pain or palpitations  RESPIRATORY:  No dyspnea  GASTROINTESTINAL:  No nausea, vomiting, diarrhea, or abdominal pain  GENITOURINARY:  No dysuria  NEUROLOGIC:  No headache,     Allergies    codeine (Anaphylaxis)    Intolerances        ANTIBIOTICS/RELEVANT:  antimicrobials  meropenem  IVPB 500 milliGRAM(s) IV Intermittent every 12 hours  nystatin    Suspension 200906 Unit(s) Oral four times a day  valGANciclovir 450 milliGRAM(s) Oral <User Schedule>  vancomycin    Solution 125 milliGRAM(s) Oral every 6 hours    immunologic:  epoetin barry-epbx (RETACRIT) Injectable 69182 Unit(s) IV Push every 7 days  everolimus (ZORTRESS) 2 milliGRAM(s) Oral <User Schedule>  everolimus (ZORTRESS) 2 milliGRAM(s) Oral <User Schedule>    OTHER:  aspirin  chewable 81 milliGRAM(s) Oral daily  chlorhexidine 2% Cloths 1 Application(s) Topical <User Schedule>  famotidine    Tablet 40 milliGRAM(s) Oral daily  heparin   Injectable 5000 Unit(s) SubCutaneous every 12 hours  insulin glargine Injectable (LANTUS) 18 Unit(s) SubCutaneous at bedtime  insulin lispro (ADMELOG) corrective regimen sliding scale   SubCutaneous three times a day before meals  insulin lispro (ADMELOG) corrective regimen sliding scale   SubCutaneous at bedtime  insulin lispro Injectable (ADMELOG) 7 Unit(s) SubCutaneous three times a day before meals  magnesium oxide 400 milliGRAM(s) Oral daily  NIFEdipine XL 30 milliGRAM(s) Oral daily  predniSONE   Tablet 5 milliGRAM(s) Oral two times a day  sodium bicarbonate 1300 milliGRAM(s) Oral three times a day  tamsulosin 0.4 milliGRAM(s) Oral at bedtime      Objective:  Vital Signs Last 24 Hrs  T(C): 36.9 (25 Dec 2020 17:00), Max: 37.3 (24 Dec 2020 21:30)  T(F): 98.5 (25 Dec 2020 17:00), Max: 99.1 (24 Dec 2020 21:30)  HR: 81 (25 Dec 2020 17:00) (73 - 89)  BP: 148/68 (25 Dec 2020 17:00) (119/59 - 148/68)  BP(mean): --  RR: 18 (25 Dec 2020 17:00) (18 - 18)  SpO2: 98% (25 Dec 2020 17:00) (96% - 100%)    PHYSICAL EXAM:  Constitutional:no acute distress  Eyes:ROHAN, EOMI  Ear/Nose/Throat: no oral lesions, 	  Respiratory: clear BL  Cardiovascular: S1S2  Gastrointestinal:soft, (+) BS, no tenderness, OLTx scars  Extremities:no e/e/c  No Lymphadenopathy  IV sites not inflammed.    LABS:                        8.6    4.63  )-----------( 164      ( 25 Dec 2020 06:26 )             26.7     12-25    138  |  98  |  69<H>  ----------------------------<  195<H>  4.5   |  23  |  4.92<H>    Ca    8.4      25 Dec 2020 06:26  Phos  6.0     12-25  Mg     2.0     12-25    TPro  5.7<L>  /  Alb  3.0<L>  /  TBili  0.2  /  DBili  x   /  AST  25  /  ALT  27  /  AlkPhos  148<H>  12-25    PT/INR - ( 25 Dec 2020 06:27 )   PT: 12.4 sec;   INR: 1.04 ratio         PTT - ( 25 Dec 2020 06:27 )  PTT:29.5 sec      MICROBIOLOGY:  Culture Results:   Babesia microti PCR  Results: NOT detected  ***************Result Note*************  The detection of Babesia microti by PCR has only been  validated for whole blood; this test has not been approved  by the US Food and Drug Administration (FDA). Performance  characteristics of this assay have been determined by  [facility]. The clinical significance  of results should be considered in conjunction with the  overall clinical presentation of the patient. Result is not  intended to be used as the sole means for clinical diagnosis  or patient management decisions.  One negative sample does not necessarily rule  out the presence of a parasitic infection. (12-25 @ 09:01)            RECENT CULTURES:  12-25 @ 09:01  .Blood  --  --  --    Babesia microti PCR  Results: NOT detected  ***************Result Note*************  The detection of Babesia microti by PCR has only been  validated for whole blood; this test has not been approved  by the US Food and Drug Administration (FDA). Performance  characteristics of this assay have been determined by  [facility]. The clinical significance  of results should be considered in conjunction with the  overall clinical presentation of the patient. Result is not  intended to be used as the sole means for clinical diagnosis  or patient management decisions.  One negative sample does not necessarily rule  out the presence of a parasitic infection.  --  12-20 @ 11:52  .Blood Blood-Peripheral  --  --  --    No Growth Final  --      RADIOLOGY & ADDITIONAL STUDIES:    < from: CT Chest No Cont (12.23.20 @ 18:39) >  IMPRESSION:  1. Third spacing, as characterized by pleural effusions, anasarca, ascites.  2. Stable 5 mm stone in the distal left ureter without hydroureteronephrosis.  3. Scattered 1 to 2 mm nodules, new from 7/11/2020. Unless the patient is at high risk for malignancy, these do not merit follow-up.    < end of copied text >

## 2020-12-25 NOTE — PROGRESS NOTE ADULT - ATTENDING COMMENTS
s/p OLT 5 months ago. Admitted with MISA, transaminitis  LFT's improved, back to baseline  MISA stable, non-oliguric. Cr stable at 4.9. Last HD 3 days ago. no need for HD at this time. Cont lasix daily  immunosuppression - Cont everolimus 2mg bid, prednisone 5 bid, Cellcept held due to infection  f/u nephrology  encourage ambulation with assistance  cont abx as per ID for osteo and c.diff colitis- both improving

## 2020-12-25 NOTE — PROGRESS NOTE ADULT - ASSESSMENT
·	MISA  ·	c diff  ·	s/p liver transplant      Plan:  diarrhea resolved  cont po vanco per ID   diet as tolerated  HD per renal   lfts improving   ct noted   care per transplant services

## 2020-12-25 NOTE — PROGRESS NOTE ADULT - ASSESSMENT
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, CNI Neurotoxicity), IDDM, hyperlipidemia, obesity, HFpEF with mild LV diastolic dysfunction, MGUS who presented with fevers, diarrhea and abnormal labwork    Of note, recent admission for R Heel Osteomyelitis   s/p right foot heel incision and drainage w/ application of stravix and bone biopsy on 11/24  Superficial cultures with Pseudomonas and Operative Cultures with Coag Neg Staph in fluid media  Discharged on IV Cefepime with plan for 6 week course    On 12/14 labwork was noted to have MISA and Transaminitis  Of note [facility] outpatient labs from 12/9 with Cr of 1.41 and LFT's WNL    U/A (12/15, 12/17, 12/20), with pyuria  UCx (12/15, 12/17) with NGTD  COVID19 PCR (12/14) Negative  RVP / COVID19 PCR (12/17, 12/20) Negative  C diff toxin assay (12/15) indeterminate with Positive C diff PCR  CXR (12/15, 12/21) without pulmonary infiltrates  CT A/P (12/15) with distal left ureteral calculus without hydronephrosis and Diffuse wall thickening, slightly asymmetric on the left but without prostate abscess.   Renal US (12/16) with left collecting system fullness but no hydronephrosis    Curiously patient's diarrhea resolved in under 24 hours of PO Vancomycin therapy which does raise question if the PCR picked up colonization as opposed to true infection.   We will clearly have to cover with full course for C diff.    He was also noted to have pyuria on his U/A, with bladder wall thickening and some left ureteral prominence with nonobstructing calculus.   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   Now with no growth to date on his urine cultures.     The patient's heel appears without cellulitis or drainage so I doubt that this is the source of his fever.     With regards to his MISA etiology is also unclear - could be from AIN from Cefepime or alternate agent. Urine eosinophils were positive. He also had could have ATN from diarrhea and intravascular volume depletion at home but no significant hypotension here so this does bring ATN into question. Reasonable to avoid Cefepime at this time and cover with Meropenem (which would cover his previous ESBL isolates and the Pseudomonas from his osteomyelitis)    With regards to his persistent fever: unclear etiology at this point. He does not appear toxic and culture data is unrevealing. Possible it is noninfectious. He did have transaminitis on presentation and now only with alk phos elevation. Initially question of rejection was raised but with normalization of LFT's this appears less likely. Possible the AIN (if this is the cause of the MISA) is contributing to his fevers.     CT Chest (12/23) with small pulmonary nodules - doubt this is the cause of his fever  CT A/P (12/23) with persistent 5 mm left ureteral stone without hydronephrosis    No obvious etiology for fever/infection on the CT imaging unless the ureteral stone is source however, absence of hydronephrosis argues against this.     Overall, C diff infection, Fever, Transaminitis, MISA, Abnormal Urinalysis, Nephrolithiasis, Heel Osteomyelitis, Liver Transplant Recipient    # Fever / Transaminitis  - Continue Meropenem 500 mg IV q24 hours (will need to increase to 500 mg IV Q12H if renal recovery)  - If clinical deterioration / instability recommend Daptomycin (Prior VRE infection post-transplant)  - Follow up on Adenovirus PCR, EBV PCR, Parvovirus PCR, CMV PCR     # C. diff Infection / Diarrhea  - Continue oral vancomycin 125 mg PO q6 hours (End Date: 12/28/20) followed by: Vancomycin 125 mg PO Q12H (while on Meropenem - End Date: 1/5/20)    # RLE Heel OM  - Continue Meropenem to 500 mg IV q12 hours (Tentative End Date: 1/5/2020 - 6 weeks of pseudomonas coverage for Pseudomonas)    # Acute kidney injury / Abnormal Urinalysis / Nephrolithiasis   - Continue Meropenem to 500 mg IV q12 hours; now off of Cefepime given ?AIN  - Management of MISA per transplant nephrology    #Liver Transplant Recipient  - Continue Valcyte 450mg PO TIW  - Bactrim on hold due to MISA - can consider adding Mepron      Clinically improved    Saeed Menezse MD  [phone]  After 5pm/weekends [phone]

## 2020-12-25 NOTE — PROGRESS NOTE ADULT - SUBJECTIVE AND OBJECTIVE BOX
[facility]  Ramón Morales PA-C  [address] [place] [address]  [phone]      INTERVAL HPI/OVERNIGHT EVENTS:    patient seen and examined  events noted        MEDICATIONS  (STANDING):  aspirin  chewable 81 milliGRAM(s) Oral daily  chlorhexidine 2% Cloths 1 Application(s) Topical daily  epoetin barry-epbx (RETACRIT) Injectable 97312 Unit(s) SubCutaneous every 7 days  everolimus (ZORTRESS) 1.5 milliGRAM(s) Oral <User Schedule>  heparin   Injectable 5000 Unit(s) SubCutaneous every 12 hours  insulin glargine Injectable (LANTUS) 10 Unit(s) SubCutaneous at bedtime  insulin lispro (ADMELOG) corrective regimen sliding scale   SubCutaneous three times a day before meals  insulin lispro (ADMELOG) corrective regimen sliding scale   SubCutaneous at bedtime  insulin lispro Injectable (ADMELOG) 5 Unit(s) SubCutaneous three times a day before meals  insulin lispro Injectable (ADMELOG) 2 Unit(s) SubCutaneous once  magnesium oxide 400 milliGRAM(s) Oral daily  meropenem  IVPB 500 milliGRAM(s) IV Intermittent every 24 hours  nystatin    Suspension 066040 Unit(s) Oral four times a day  ofloxacin 0.3% Solution 10 Drop(s) Right Ear two times a day  pantoprazole    Tablet 40 milliGRAM(s) Oral before breakfast  predniSONE   Tablet 5 milliGRAM(s) Oral two times a day  sodium bicarbonate 1300 milliGRAM(s) Oral three times a day  sodium bicarbonate  Infusion 0.05 mEq/kG/Hr (75 mL/Hr) IV Continuous <Continuous>  tamsulosin 0.4 milliGRAM(s) Oral at bedtime  valGANciclovir 450 milliGRAM(s) Oral <User Schedule>  vancomycin    Solution 125 milliGRAM(s) Oral every 6 hours    MEDICATIONS  (PRN):      Allergies    codeine (Anaphylaxis)    Intolerances        ROS:   General:  No wt loss, +fevers, chills, night sweats, + fatigue,   Eyes:  Good vision, no reported pain  ENT:  No sore throat, pain, runny nose, dysphagia  CV:  No pain, palpitations, hypo/hypertension  Resp:  No dyspnea, cough, tachypnea, wheezing  GI:  No pain, No nausea, No vomiting, No diarrhea, No constipation, No weight loss, No fever, No pruritis, No rectal bleeding, No tarry stools, No dysphagia,  :  No pain, bleeding, incontinence, nocturia  Muscle:  No pain, weakness  Neuro:  No weakness, tingling, memory problems  Psych:  No fatigue, insomnia, mood problems, depression  Endocrine:  No polyuria, polydipsia, cold/heat intolerance  Heme:  No petechiae, ecchymosis, easy bruisability  Skin:  No rash, tattoos, scars, edema      PHYSICAL EXAM:   Vital Signs:  Vital Signs Last 24 Hrs  T(C): 36.5 (19 Dec 2020 15:00), Max: 37.2 (19 Dec 2020 04:00)  T(F): 97.7 (19 Dec 2020 15:00), Max: 99 (19 Dec 2020 04:00)  HR: 75 (19 Dec 2020 15:00) (75 - 91)  BP: 148/68 (19 Dec 2020 15:00) (118/81 - 163/70)  BP(mean): 95 (19 Dec 2020 15:00) (90 - 106)  RR: 20 (19 Dec 2020 15:00) (16 - 20)  SpO2: 96% (19 Dec 2020 15:00) (92% - 98%)  Daily     Daily Weight in k.8 (18 Dec 2020 20:35)    GENERAL:  no distress  HEENT:  NC/AT  ABDOMEN:  Soft, non-tender, non-distended, normoactive bowel sounds  EXTEREMITIES:  no cyanosis,clubbing or edema  SKIN:  No rash/erythema/ecchymoses/petechiae/wounds/abscess/warm/dry  NEURO:  Alert, oriented, no asterixis, no tremor, no encephalopathy      LABS:                        7.4    4.50  )-----------( 179      ( 19 Dec 2020 05:29 )             22.9     12-    133<L>  |  101  |  92<H>  ----------------------------<  224<H>  4.7   |  16<L>  |  6.94<H>    Ca    8.2<L>      19 Dec 2020 05:29  Phos  4.6       Mg     2.2         TPro  5.8<L>  /  Alb  2.6<L>  /  TBili  0.1<L>  /  DBili  x   /  AST  31  /  ALT  47<H>  /  AlkPhos  222<H>  -    PT/INR - ( 19 Dec 2020 05:29 )   PT: 13.1 sec;   INR: 1.10 ratio         PTT - ( 19 Dec 2020 05:29 )  PTT:26.1 sec  Urinalysis Basic - ( 17 Dec 2020 16:54 )    Color: Light Yellow / Appearance: Slightly Turbid / S.012 / pH: x  Gluc: x / Ketone: Negative  / Bili: Negative / Urobili: Negative   Blood: x / Protein: 100 mg/dL / Nitrite: Negative   Leuk Esterase: Large / RBC: 3 /hpf /  /HPF   Sq Epi: x / Non Sq Epi: 0 /hpf / Bacteria: Negative        RADIOLOGY & ADDITIONAL TESTS:

## 2020-12-26 LAB
ALBUMIN SERPL ELPH-MCNC: 2.7 G/DL — LOW (ref 3.3–5)
ALP SERPL-CCNC: 139 U/L — HIGH (ref 40–120)
ALT FLD-CCNC: 23 U/L — SIGNIFICANT CHANGE UP (ref 10–45)
ANION GAP SERPL CALC-SCNC: 13 MMOL/L — SIGNIFICANT CHANGE UP (ref 5–17)
APTT BLD: 28.5 SEC — SIGNIFICANT CHANGE UP (ref 27.5–35.5)
AST SERPL-CCNC: 21 U/L — SIGNIFICANT CHANGE UP (ref 10–40)
BASOPHILS # BLD AUTO: 0.02 K/UL — SIGNIFICANT CHANGE UP (ref 0–0.2)
BASOPHILS NFR BLD AUTO: 0.5 % — SIGNIFICANT CHANGE UP (ref 0–2)
BILIRUB SERPL-MCNC: 0.1 MG/DL — LOW (ref 0.2–1.2)
BUN SERPL-MCNC: 73 MG/DL — HIGH (ref 7–23)
CALCIUM SERPL-MCNC: 7.9 MG/DL — LOW (ref 8.4–10.5)
CHLORIDE SERPL-SCNC: 99 MMOL/L — SIGNIFICANT CHANGE UP (ref 96–108)
CO2 SERPL-SCNC: 24 MMOL/L — SIGNIFICANT CHANGE UP (ref 22–31)
CREAT SERPL-MCNC: 4.49 MG/DL — HIGH (ref 0.5–1.3)
EOSINOPHIL # BLD AUTO: 0.04 K/UL — SIGNIFICANT CHANGE UP (ref 0–0.5)
EOSINOPHIL NFR BLD AUTO: 1 % — SIGNIFICANT CHANGE UP (ref 0–6)
EVEROLIMUS, WHOLE BLOOD RESULT: 4.5 NG/ML — SIGNIFICANT CHANGE UP (ref 3–8)
GLUCOSE BLDC GLUCOMTR-MCNC: 170 MG/DL — HIGH (ref 70–99)
GLUCOSE BLDC GLUCOMTR-MCNC: 212 MG/DL — HIGH (ref 70–99)
GLUCOSE BLDC GLUCOMTR-MCNC: 223 MG/DL — HIGH (ref 70–99)
GLUCOSE BLDC GLUCOMTR-MCNC: 240 MG/DL — HIGH (ref 70–99)
GLUCOSE SERPL-MCNC: 194 MG/DL — HIGH (ref 70–99)
HCT VFR BLD CALC: 26.1 % — LOW (ref 39–50)
HERPES VIRUS 6 ANTIBODIES INTERPRETATION: SIGNIFICANT CHANGE UP
HGB BLD-MCNC: 8.3 G/DL — LOW (ref 13–17)
HHV6 IGM SER-ACNC: ABNORMAL
HHV6 IGM SER-ACNC: SIGNIFICANT CHANGE UP
IMM GRANULOCYTES NFR BLD AUTO: 1.2 % — SIGNIFICANT CHANGE UP (ref 0–1.5)
INR BLD: 1.07 RATIO — SIGNIFICANT CHANGE UP (ref 0.88–1.16)
LYMPHOCYTES # BLD AUTO: 0.59 K/UL — LOW (ref 1–3.3)
LYMPHOCYTES # BLD AUTO: 14.1 % — SIGNIFICANT CHANGE UP (ref 13–44)
MAGNESIUM SERPL-MCNC: 1.9 MG/DL — SIGNIFICANT CHANGE UP (ref 1.6–2.6)
MCHC RBC-ENTMCNC: 28.4 PG — SIGNIFICANT CHANGE UP (ref 27–34)
MCHC RBC-ENTMCNC: 31.8 GM/DL — LOW (ref 32–36)
MCV RBC AUTO: 89.4 FL — SIGNIFICANT CHANGE UP (ref 80–100)
MONOCYTES # BLD AUTO: 0.32 K/UL — SIGNIFICANT CHANGE UP (ref 0–0.9)
MONOCYTES NFR BLD AUTO: 7.6 % — SIGNIFICANT CHANGE UP (ref 2–14)
NEUTROPHILS # BLD AUTO: 3.17 K/UL — SIGNIFICANT CHANGE UP (ref 1.8–7.4)
NEUTROPHILS NFR BLD AUTO: 75.6 % — SIGNIFICANT CHANGE UP (ref 43–77)
NRBC # BLD: 0 /100 WBCS — SIGNIFICANT CHANGE UP (ref 0–0)
PHOSPHATE SERPL-MCNC: 6.1 MG/DL — HIGH (ref 2.5–4.5)
PLATELET # BLD AUTO: 171 K/UL — SIGNIFICANT CHANGE UP (ref 150–400)
POTASSIUM SERPL-MCNC: 4.9 MMOL/L — SIGNIFICANT CHANGE UP (ref 3.5–5.3)
POTASSIUM SERPL-SCNC: 4.9 MMOL/L — SIGNIFICANT CHANGE UP (ref 3.5–5.3)
PROT SERPL-MCNC: 5.7 G/DL — LOW (ref 6–8.3)
PROTHROM AB SERPL-ACNC: 12.8 SEC — SIGNIFICANT CHANGE UP (ref 10.6–13.6)
RBC # BLD: 2.92 M/UL — LOW (ref 4.2–5.8)
RBC # FLD: 13.5 % — SIGNIFICANT CHANGE UP (ref 10.3–14.5)
SODIUM SERPL-SCNC: 136 MMOL/L — SIGNIFICANT CHANGE UP (ref 135–145)
WBC # BLD: 4.19 K/UL — SIGNIFICANT CHANGE UP (ref 3.8–10.5)
WBC # FLD AUTO: 4.19 K/UL — SIGNIFICANT CHANGE UP (ref 3.8–10.5)

## 2020-12-26 PROCEDURE — 99232 SBSQ HOSP IP/OBS MODERATE 35: CPT | Mod: GC

## 2020-12-26 PROCEDURE — 99232 SBSQ HOSP IP/OBS MODERATE 35: CPT

## 2020-12-26 RX ADMIN — TAMSULOSIN HYDROCHLORIDE 0.4 MILLIGRAM(S): 0.4 CAPSULE ORAL at 20:47

## 2020-12-26 RX ADMIN — FAMOTIDINE 40 MILLIGRAM(S): 10 INJECTION INTRAVENOUS at 11:40

## 2020-12-26 RX ADMIN — HEPARIN SODIUM 5000 UNIT(S): 5000 INJECTION INTRAVENOUS; SUBCUTANEOUS at 17:17

## 2020-12-26 RX ADMIN — Medication 500000 UNIT(S): at 17:17

## 2020-12-26 RX ADMIN — Medication 125 MILLIGRAM(S): at 00:30

## 2020-12-26 RX ADMIN — Medication 7 UNIT(S): at 08:30

## 2020-12-26 RX ADMIN — Medication 30 MILLIGRAM(S): at 05:54

## 2020-12-26 RX ADMIN — Medication 81 MILLIGRAM(S): at 11:39

## 2020-12-26 RX ADMIN — MEROPENEM 100 MILLIGRAM(S): 1 INJECTION INTRAVENOUS at 17:17

## 2020-12-26 RX ADMIN — HEPARIN SODIUM 5000 UNIT(S): 5000 INJECTION INTRAVENOUS; SUBCUTANEOUS at 05:54

## 2020-12-26 RX ADMIN — Medication 7 UNIT(S): at 17:34

## 2020-12-26 RX ADMIN — Medication 125 MILLIGRAM(S): at 05:55

## 2020-12-26 RX ADMIN — INSULIN GLARGINE 18 UNIT(S): 100 INJECTION, SOLUTION SUBCUTANEOUS at 22:33

## 2020-12-26 RX ADMIN — Medication 125 MILLIGRAM(S): at 22:34

## 2020-12-26 RX ADMIN — MAGNESIUM OXIDE 400 MG ORAL TABLET 400 MILLIGRAM(S): 241.3 TABLET ORAL at 11:40

## 2020-12-26 RX ADMIN — Medication 7 UNIT(S): at 12:15

## 2020-12-26 RX ADMIN — Medication 125 MILLIGRAM(S): at 11:40

## 2020-12-26 RX ADMIN — Medication 5 MILLIGRAM(S): at 05:57

## 2020-12-26 RX ADMIN — EVEROLIMUS 2 MILLIGRAM(S): 10 TABLET ORAL at 07:38

## 2020-12-26 RX ADMIN — Medication 1300 MILLIGRAM(S): at 05:54

## 2020-12-26 RX ADMIN — Medication 500000 UNIT(S): at 11:40

## 2020-12-26 RX ADMIN — Medication 4: at 12:15

## 2020-12-26 RX ADMIN — VALGANCICLOVIR 450 MILLIGRAM(S): 450 TABLET, FILM COATED ORAL at 10:20

## 2020-12-26 RX ADMIN — Medication 500000 UNIT(S): at 05:55

## 2020-12-26 RX ADMIN — Medication 5 MILLIGRAM(S): at 17:16

## 2020-12-26 RX ADMIN — Medication 500000 UNIT(S): at 00:30

## 2020-12-26 RX ADMIN — Medication 4: at 17:33

## 2020-12-26 RX ADMIN — Medication 2: at 08:30

## 2020-12-26 RX ADMIN — Medication 125 MILLIGRAM(S): at 17:16

## 2020-12-26 RX ADMIN — Medication 500000 UNIT(S): at 22:36

## 2020-12-26 RX ADMIN — MEROPENEM 100 MILLIGRAM(S): 1 INJECTION INTRAVENOUS at 05:53

## 2020-12-26 RX ADMIN — EVEROLIMUS 2 MILLIGRAM(S): 10 TABLET ORAL at 20:47

## 2020-12-26 RX ADMIN — Medication 1300 MILLIGRAM(S): at 13:14

## 2020-12-26 RX ADMIN — Medication 1300 MILLIGRAM(S): at 20:47

## 2020-12-26 NOTE — PROGRESS NOTE ADULT - SUBJECTIVE AND OBJECTIVE BOX
Interval Events:   - 2 bm overnight - soft consistency   - SCr downtrending to 4.5 from 4.9    Hospital Medications:  aspirin  chewable 81 milliGRAM(s) Oral daily  chlorhexidine 2% Cloths 1 Application(s) Topical <User Schedule>  epoetin barry-epbx (RETACRIT) Injectable 53273 Unit(s) IV Push every 7 days  everolimus (ZORTRESS) 2 milliGRAM(s) Oral <User Schedule>  everolimus (ZORTRESS) 2 milliGRAM(s) Oral <User Schedule>  famotidine    Tablet 40 milliGRAM(s) Oral daily  heparin   Injectable 5000 Unit(s) SubCutaneous every 12 hours  insulin glargine Injectable (LANTUS) 18 Unit(s) SubCutaneous at bedtime  insulin lispro (ADMELOG) corrective regimen sliding scale   SubCutaneous three times a day before meals  insulin lispro (ADMELOG) corrective regimen sliding scale   SubCutaneous at bedtime  insulin lispro Injectable (ADMELOG) 7 Unit(s) SubCutaneous three times a day before meals  magnesium oxide 400 milliGRAM(s) Oral daily  meropenem  IVPB 500 milliGRAM(s) IV Intermittent every 12 hours  NIFEdipine XL 30 milliGRAM(s) Oral daily  nystatin    Suspension 542732 Unit(s) Oral four times a day  predniSONE   Tablet 5 milliGRAM(s) Oral two times a day  sodium bicarbonate 1300 milliGRAM(s) Oral three times a day  tamsulosin 0.4 milliGRAM(s) Oral at bedtime  valGANciclovir 450 milliGRAM(s) Oral <User Schedule>  vancomycin    Solution 125 milliGRAM(s) Oral every 6 hours        ROS:   General:  No fevers, chills or night sweats  ENT:  No sore throat or dysphagia  CV:  No pain or palpitations  Resp:  No dyspnea, cough or  wheezing  GI:  as above  Skin:  No rash or edema  Neuro: no weakness   Hematologic: no bleeding  Musculoskeletal: no muscle pain or join pain  Psych: no agitation      PHYSICAL EXAM:   Vital Signs:  Vital Signs Last 24 Hrs  T(C): 36.7 (26 Dec 2020 05:00), Max: 36.9 (25 Dec 2020 17:00)  T(F): 98 (26 Dec 2020 05:00), Max: 98.5 (25 Dec 2020 17:00)  HR: 73 (26 Dec 2020 05:00) (73 - 89)  BP: 136/66 (26 Dec 2020 05:00) (123/64 - 166/72)  BP(mean): --  RR: 18 (26 Dec 2020 05:00) (18 - 18)  SpO2: 94% (26 Dec 2020 05:00) (94% - 100%)  Daily     Daily Weight in k.7 (26 Dec 2020 05:00)    Constitutional: Well developed / well nourished  Eyes: Anicteric, PERRLA  ENMT: nc/at  Respiratory: CTA B/L  Cardiovascular: RRR, + heart murmur  Gastrointestinal: Soft, mild distention, non tender, incision well healed  Genitourinary: Voiding spontaneously  Extremities: SCD's in place and working bilaterally. no edema  Vascular: Palpable dp pulses bilaterally  Neurological: A&O x3  Skin: R heel ulcer: no drainage, no cellulitis  Musculoskeletal: Moving all extremities  Psychiatric: Responsive    LABS:                        8.3    4.19  )-----------( 171      ( 26 Dec 2020 06:31 )             26.1     Mean Cell Volume: 89.4 fl (-20 @ 06:31)        136  |  99  |  73<H>  ----------------------------<  194<H>  4.9   |  24  |  4.49<H>    Ca    7.9<L>      26 Dec 2020 06:31  Phos  6.1       Mg     1.9         TPro  5.7<L>  /  Alb  2.7<L>  /  TBili  0.1<L>  /  DBili  x   /  AST  21  /  ALT  23  /  AlkPhos  139<H>      LIVER FUNCTIONS - ( 26 Dec 2020 06:31 )  Alb: 2.7 g/dL / Pro: 5.7 g/dL / ALK PHOS: 139 U/L / ALT: 23 U/L / AST: 21 U/L / GGT: x           PT/INR - ( 26 Dec 2020 06:31 )   PT: 12.8 sec;   INR: 1.07 ratio         PTT - ( 26 Dec 2020 06:31 )  PTT:28.5 sec                            8.3    4.19  )-----------( 171      ( 26 Dec 2020 06:31 )             26.1                         8.6    4.63  )-----------( 164      ( 25 Dec 2020 06:26 )             26.7                         8.4    4.32  )-----------( 156      ( 24 Dec 2020 05:57 )             25.8       Imaging:

## 2020-12-26 NOTE — PROGRESS NOTE ADULT - SUBJECTIVE AND OBJECTIVE BOX
Transplant Surgery - Multidisciplinary Rounds  --------------------------------------------------------------  s/p OLT 7/2/2020      Admitted 12/14/2020 with transaminitis, MISA, diarrhea    Present:  Patient seen and examined during AM rounds with multidisciplinary team including Transplant Surgeon: Dr. Garcia, Transplant Nephrologist: Dr. FAY Patel, Inpatient Providers: Patrick resident Tahira, unit RN. Disciplines not in attendance will be notified of the plan.      HPI: 65M with PMHx of IDDM, HLD, obesity, HFpEF with mild LV diastolic dysfunction, MGUS, chronic anemia with a history of duodenal ulcer 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  Underwent OLT on 7/2/2020, post op course c/b VRE bacteremia tx with linezolid and delirium likely in setting of CNI toxicity (FK discontinued/Everolimus initiated 7/27).    Recent admission from 11/20-12/2 for OM of the R heel, s/p debridement with Podiatry, and was discharged with Cefepime x 6 weeks via PICC line (placed 12/1). Initial wound cx on admission grew Pseudomonas, OR cx grew staph epi. Was also found with neutropenia and Valcyte was reduced (was on 900mg daily CMV +/-).      Sent to ED from clinic with rising transaminitis and MISA (SCr 3.3 from 1.5). Hospital course c/b C diff colitis and worsening renal function requiring HD.     Interval Events:  - afebrile, stable VSS. Last episode of fever on 12/22 with neg bld cx to date  - Renal function: SCr downtrending to 4.5 from 4.9, required HD x 2 12/19 and 12/22, u/o improving 2.6L  - BP better controlled; started on Nifedipine XL 30mg daily yesterday  - LFTs stable     Potential Discharge date: pending clinical improvement     Education:  Medications    Plan of care:  See Below       MEDICATIONS  (STANDING):  aspirin  chewable 81 milliGRAM(s) Oral daily  chlorhexidine 2% Cloths 1 Application(s) Topical <User Schedule>  epoetin barry-epbx (RETACRIT) Injectable 50165 Unit(s) IV Push every 7 days  everolimus (ZORTRESS) 2 milliGRAM(s) Oral <User Schedule>  everolimus (ZORTRESS) 2 milliGRAM(s) Oral <User Schedule>  famotidine    Tablet 40 milliGRAM(s) Oral daily  heparin   Injectable 5000 Unit(s) SubCutaneous every 12 hours  insulin glargine Injectable (LANTUS) 18 Unit(s) SubCutaneous at bedtime  insulin lispro (ADMELOG) corrective regimen sliding scale   SubCutaneous three times a day before meals  insulin lispro (ADMELOG) corrective regimen sliding scale   SubCutaneous at bedtime  insulin lispro Injectable (ADMELOG) 7 Unit(s) SubCutaneous three times a day before meals  magnesium oxide 400 milliGRAM(s) Oral daily  meropenem  IVPB 500 milliGRAM(s) IV Intermittent every 12 hours  NIFEdipine XL 30 milliGRAM(s) Oral daily  nystatin    Suspension 574403 Unit(s) Oral four times a day  predniSONE   Tablet 5 milliGRAM(s) Oral two times a day  sodium bicarbonate 1300 milliGRAM(s) Oral three times a day  tamsulosin 0.4 milliGRAM(s) Oral at bedtime  valGANciclovir 450 milliGRAM(s) Oral <User Schedule>  vancomycin    Solution 125 milliGRAM(s) Oral every 6 hours    MEDICATIONS  (PRN):      PAST MEDICAL & SURGICAL HISTORY:  GIB (gastrointestinal bleeding)    GERD with esophagitis  Gastritis &amp; Non Bleeding Ulcers    Hepatic encephalopathy    Obesity    Fatty liver disease, nonalcoholic    Renal stones  25 years ago    Hypertension    Neuropathy    Hypercholesteremia    Diabetes    S/P cholecystectomy        Vital Signs Last 24 Hrs  T(C): 36.7 (26 Dec 2020 05:00), Max: 36.9 (25 Dec 2020 09:00)  T(F): 98 (26 Dec 2020 05:00), Max: 98.5 (25 Dec 2020 09:00)  HR: 73 (26 Dec 2020 05:00) (73 - 89)  BP: 136/66 (26 Dec 2020 05:00) (119/59 - 166/72)  BP(mean): --  RR: 18 (26 Dec 2020 05:00) (18 - 18)  SpO2: 94% (26 Dec 2020 05:00) (94% - 100%)    I&O's Summary    25 Dec 2020 07:01  -  26 Dec 2020 07:00  --------------------------------------------------------  IN: 950 mL / OUT: 2620 mL / NET: -1670 mL                              8.3    4.19  )-----------( 171      ( 26 Dec 2020 06:31 )             26.1     12-26    136  |  99  |  73<H>  ----------------------------<  194<H>  4.9   |  24  |  4.49<H>    Ca    7.9<L>      26 Dec 2020 06:31  Phos  6.1     12-26  Mg     1.9     12-26    TPro  5.7<L>  /  Alb  2.7<L>  /  TBili  0.1<L>  /  DBili  x   /  AST  21  /  ALT  23  /  AlkPhos  139<H>  12-26          Babesia microti PCR, Blood. (collected 12-25-20 @ 09:01)  Source: .Blood  Final Report (12-25-20 @ 12:15):    Babesia microti PCR    Results: NOT detected    ***************Result Note*************    The detection of Babesia microti by PCR has only been    validated for whole blood; this test has not been approved    by the US Food and Drug Administration (FDA). Performance    characteristics of this assay have been determined by    [employer]. The clinical significance    of results should be considered in conjunction with the    overall clinical presentation of the patient. Result is not    intended to be used as the sole means for clinical diagnosis    or patient management decisions.    One negative sample does not necessarily rule    out the presence of a parasitic infection.    Culture - Blood (collected 12-20-20 @ 11:52)  Source: .Blood Blood-Peripheral  Final Report (12-25-20 @ 12:00):    No Growth Final    Culture - Blood (collected 12-20-20 @ 11:52)  Source: .Blood Blood-Peripheral  Final Report (12-25-20 @ 12:00):    No Growth Final              Review of systems  Gen: afebrile  Skin:  R heel wound   Head/Eyes/Ears/Mouth: No headache; Normal hearing; Normal vision w/o blurriness; No sinus pain/discomfort, sore throat  Respiratory: No dyspnea, cough, wheezing, hemoptysis  CV: No chest pain, PND, orthopnea  GI:  denies abdominal pain, diarrhea, constipation, nausea, vomiting, melena, hematochezia  : No increased frequency, dysuria, hematuria, nocturia  MSK: No joint pain/swelling; no back pain; no edema  Neuro: No dizziness/lightheadedness, weakness, seizures, numbness, tingling  Heme: No easy bruising or bleeding  Endo: No heat/cold intolerance  Psych: No significant nervousness, anxiety, stress, depression  All other systems were reviewed and are negative, except as noted.      PHYSICAL EXAM:  Constitutional: Well developed / well nourished  Eyes: Anicteric, PERRLA  ENMT: nc/at  Respiratory: CTA B/L  Cardiovascular: RRR, + heart murmur  Gastrointestinal: Soft, mild distention, non tender, incision well healed  Genitourinary: Voiding spontaneously  Extremities: SCD's in place and working bilaterally. no edema  Vascular: Palpable dp pulses bilaterally  Neurological: A&O x3  Skin: R heel ulcer: no drainage, no cellulitis  Musculoskeletal: Moving all extremities  Psychiatric: Responsive

## 2020-12-26 NOTE — PROGRESS NOTE ADULT - ASSESSMENT
65M with PMHx of IDDM, HLD, obesity, HFpEF with mild LV diastolic dysfunction, MGUS, chronic anemia with a history of duodenal ulcer 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  Underwent OLT on 7/2/2020, post op coure c/b VRE bacteremia tx with linezolid and delirium likely in setting of CNI toxicity (FK discontinued/Everolimus initiated 7/27).    Recent admission from 11/20-12/2 for OM of the R heel, s/p debridement with Podiatry, and was discharged with Cefepime x 6 weeks via PICC line (placed 12/1).     Initial wound cx on admission grew Pseudomonas, OR cx grew staph epi.  He now presents with from clinic rising transaminitis and MISA (SCr 3.3 from 1.5)    Hospital course c/b C. Diff colitis and worsening renal function, requiring HD    [] ID:   - Afebrile past 24hrs, bld/urine cxs negative to date  - C. Diff colitis: continue Vanco PO q6hr until 12/28, then decreased to bid while on meropenem (end date 1/5)  - R heel OM: s/p debridement by podiatry last admission; wound healing, no signs on infection, continue meropenem until 1/5   - CMV +/- :  Valcyte 450mg TIW (renally adjusted), CMV PCR neg from 12/15. will send repeat weekly,   - R ear otitis externa with chronic hearing loss: Ofloxacin per ENT  - TTE w/o obvious evidence of endocarditis     [] Non-oliguric MISA   - Improving MISA with associated uremic encephelopathy, requiring HD (12/19 and 12/22)   - SCr 4.5 from 4.9, BERNICE vick in place   - Anemia:  Procrit 10K sq weekly    [] s/p OLT with transaminitis  - Everolimus 2/2, MMF on hold since last admission in setting of OM; Pred 5/5  - PPx: Valcyte renally dosed, off Bactrim  - Liver doppler with patent vessels   - LFTs stable    [] DM  - diabetic diet  - tight glycemic control via Lantus/Lispro. FS in 250-280s overnight, Lantus increased to 18 today and premeal increased to 7

## 2020-12-26 NOTE — PROGRESS NOTE ADULT - ASSESSMENT
65M with PMHx of IDDM, HLD, obesity, HFpEF with mild LV diastolic dysfunction, MGUS, chronic anemia with a history of duodenal ulcer as well as GAVE and duodenal AVM s/p APC (last on 10/11/19), decompensated JEAN/Cirrhosis 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, post op course c/b VRE bacteremia tx with linezolid and delirium likely in setting of CNI toxicity (FK discontinued/Everolimus initiated 7/27). Recent admission from 11/20-12/2 for OM of the R heel, s/p debridement with Podiatry, and was discharged with Cefepime x 6 weeks via PICC line (placed 12/1). Initial wound cx on admission grew Pseudomonas, OR cx grew staph epi. Was also found with neutropenia and Valcyte was reduced (was on 900mg daily CMV +/-) Sent to ED from clinic with rising transaminitis and MISA (SCr 3.3 from 1.5). Hospital course c/b C diff colitis and worsening renal function requiring HD.      Impression:   # C. diff: on po vanco since 12/15. Continues to improved from symptom standpoint.  # MISA with c/f uremia:, required HD x 2 12/19 and 12/22, u/o improving.  Kidney slightly enlarged on u/s with labs and clinical picture consistent with AIN although cannot exclude acute tubular necrosis. FeNa and FeUrea suggest possible AIN (from cefepime)  # Fever: resolved. ( Last fever 12/22) CT scan unrevealing for source. Possible secondary to AIN. Underlying infection (bladder source?) is on differential. Has cdiff which is resolving. Possible osteo though low suspicion from ID and podiatry teams as patient was on targeted antibiotics with clinical appearance of a healing wound.  # Right foot osteomyelitis:  reviewed by podiatry, appears stable. s/p surgical debridement and graft placement on 11/24 by Podiatry.   Wound culture grew pseudomonas and bone bx on 11/24 growing S. epidermidis - likely contaminant. Had PICC line, now removed. On meropenem  # Otitis externa: on floxin drops  # S/p liver transplant 7/2/20:     Recipient Info:   ABO: A  CMV:  Neg  EBV Positive  Donor:  Donor ID:  [id number] Match: [id number]  Age: 29 ABO:  A1 High Risk:   No COD: Cardiovascular  Anti CMV positive, EBV IgG- positive  HepBcAb-neg, Hepatitis C-DANA- neg, Hepatitis C ab-neg    Recommendations:  - continue with po vanco, meropenum,  - immunosuppressives with everolimus and prednisone  - hold bactrim given MISA  - monitor CBC, CMP, INR  - transplant hepatology to follow    Zana Sorensen   Gastroenterology Fellow  Pager: [phone]  Please call answering service [phone] / on-call GI fellow after 5pm and before 8am, and on weekends.

## 2020-12-26 NOTE — PROGRESS NOTE ADULT - SUBJECTIVE AND OBJECTIVE BOX
[facility]  Ramón Morales PA-C  [address] [place] [address]  [phone]      INTERVAL HPI/OVERNIGHT EVENTS:    overnight events noted       MEDICATIONS  (STANDING):  aspirin  chewable 81 milliGRAM(s) Oral daily  chlorhexidine 2% Cloths 1 Application(s) Topical daily  epoetin barry-epbx (RETACRIT) Injectable 60587 Unit(s) SubCutaneous every 7 days  everolimus (ZORTRESS) 1.5 milliGRAM(s) Oral <User Schedule>  heparin   Injectable 5000 Unit(s) SubCutaneous every 12 hours  insulin glargine Injectable (LANTUS) 10 Unit(s) SubCutaneous at bedtime  insulin lispro (ADMELOG) corrective regimen sliding scale   SubCutaneous three times a day before meals  insulin lispro (ADMELOG) corrective regimen sliding scale   SubCutaneous at bedtime  insulin lispro Injectable (ADMELOG) 5 Unit(s) SubCutaneous three times a day before meals  insulin lispro Injectable (ADMELOG) 2 Unit(s) SubCutaneous once  magnesium oxide 400 milliGRAM(s) Oral daily  meropenem  IVPB 500 milliGRAM(s) IV Intermittent every 24 hours  nystatin    Suspension 372701 Unit(s) Oral four times a day  ofloxacin 0.3% Solution 10 Drop(s) Right Ear two times a day  pantoprazole    Tablet 40 milliGRAM(s) Oral before breakfast  predniSONE   Tablet 5 milliGRAM(s) Oral two times a day  sodium bicarbonate 1300 milliGRAM(s) Oral three times a day  sodium bicarbonate  Infusion 0.05 mEq/kG/Hr (75 mL/Hr) IV Continuous <Continuous>  tamsulosin 0.4 milliGRAM(s) Oral at bedtime  valGANciclovir 450 milliGRAM(s) Oral <User Schedule>  vancomycin    Solution 125 milliGRAM(s) Oral every 6 hours    MEDICATIONS  (PRN):      Allergies    codeine (Anaphylaxis)    Intolerances        ROS:   General:  No wt loss, +fevers, chills, night sweats, + fatigue,   Eyes:  Good vision, no reported pain  ENT:  No sore throat, pain, runny nose, dysphagia  CV:  No pain, palpitations, hypo/hypertension  Resp:  No dyspnea, cough, tachypnea, wheezing  GI:  No pain, No nausea, No vomiting, No diarrhea, No constipation, No weight loss, No fever, No pruritis, No rectal bleeding, No tarry stools, No dysphagia,  :  No pain, bleeding, incontinence, nocturia  Muscle:  No pain, weakness  Neuro:  No weakness, tingling, memory problems  Psych:  No fatigue, insomnia, mood problems, depression  Endocrine:  No polyuria, polydipsia, cold/heat intolerance  Heme:  No petechiae, ecchymosis, easy bruisability  Skin:  No rash, tattoos, scars, edema      PHYSICAL EXAM:   Vital Signs:  Vital Signs Last 24 Hrs  T(C): 36.5 (19 Dec 2020 15:00), Max: 37.2 (19 Dec 2020 04:00)  T(F): 97.7 (19 Dec 2020 15:00), Max: 99 (19 Dec 2020 04:00)  HR: 75 (19 Dec 2020 15:00) (75 - 91)  BP: 148/68 (19 Dec 2020 15:00) (118/81 - 163/70)  BP(mean): 95 (19 Dec 2020 15:00) (90 - 106)  RR: 20 (19 Dec 2020 15:00) (16 - 20)  SpO2: 96% (19 Dec 2020 15:00) (92% - 98%)  Daily     Daily Weight in k.8 (18 Dec 2020 20:35)    GENERAL:  no distress  HEENT:  NC/AT  ABDOMEN:  Soft, non-tender, non-distended, normoactive bowel sounds  EXTEREMITIES:  no cyanosis,clubbing or edema  SKIN:  No rash/erythema/ecchymoses/petechiae/wounds/abscess/warm/dry  NEURO:  Alert, oriented, no asterixis, no tremor, no encephalopathy      LABS:                        7.4    4.50  )-----------( 179      ( 19 Dec 2020 05:29 )             22.9     12-    133<L>  |  101  |  92<H>  ----------------------------<  224<H>  4.7   |  16<L>  |  6.94<H>    Ca    8.2<L>      19 Dec 2020 05:29  Phos  4.6       Mg     2.2         TPro  5.8<L>  /  Alb  2.6<L>  /  TBili  0.1<L>  /  DBili  x   /  AST  31  /  ALT  47<H>  /  AlkPhos  222<H>  -    PT/INR - ( 19 Dec 2020 05:29 )   PT: 13.1 sec;   INR: 1.10 ratio         PTT - ( 19 Dec 2020 05:29 )  PTT:26.1 sec  Urinalysis Basic - ( 17 Dec 2020 16:54 )    Color: Light Yellow / Appearance: Slightly Turbid / S.012 / pH: x  Gluc: x / Ketone: Negative  / Bili: Negative / Urobili: Negative   Blood: x / Protein: 100 mg/dL / Nitrite: Negative   Leuk Esterase: Large / RBC: 3 /hpf /  /HPF   Sq Epi: x / Non Sq Epi: 0 /hpf / Bacteria: Negative        RADIOLOGY & ADDITIONAL TESTS:

## 2020-12-26 NOTE — PROGRESS NOTE ADULT - SUBJECTIVE AND OBJECTIVE BOX
[facility] DIVISION OF KIDNEY DISEASES AND HYPERTENSION -- FOLLOW UP NOTE  --------------------------------------------------------------------------------  Chief Complaint:    24 hour events/subjective:  Patient was seen and examined at bedside  Has good UOP 2.6 L and net negative 1.6L in the last 24 hrs  Cr improved to 4.49 today       PAST HISTORY  --------------------------------------------------------------------------------  No significant changes to PMH, PSH, FHx, SHx, unless otherwise noted    ALLERGIES & MEDICATIONS  --------------------------------------------------------------------------------  Allergies    codeine (Anaphylaxis)    Intolerances      Standing Inpatient Medications  aspirin  chewable 81 milliGRAM(s) Oral daily  chlorhexidine 2% Cloths 1 Application(s) Topical <User Schedule>  epoetin barry-epbx (RETACRIT) Injectable 43873 Unit(s) IV Push every 7 days  everolimus (ZORTRESS) 2 milliGRAM(s) Oral <User Schedule>  everolimus (ZORTRESS) 2 milliGRAM(s) Oral <User Schedule>  famotidine    Tablet 40 milliGRAM(s) Oral daily  heparin   Injectable 5000 Unit(s) SubCutaneous every 12 hours  insulin glargine Injectable (LANTUS) 18 Unit(s) SubCutaneous at bedtime  insulin lispro (ADMELOG) corrective regimen sliding scale   SubCutaneous three times a day before meals  insulin lispro (ADMELOG) corrective regimen sliding scale   SubCutaneous at bedtime  insulin lispro Injectable (ADMELOG) 7 Unit(s) SubCutaneous three times a day before meals  magnesium oxide 400 milliGRAM(s) Oral daily  meropenem  IVPB 500 milliGRAM(s) IV Intermittent every 12 hours  NIFEdipine XL 30 milliGRAM(s) Oral daily  nystatin    Suspension 195962 Unit(s) Oral four times a day  predniSONE   Tablet 5 milliGRAM(s) Oral two times a day  sodium bicarbonate 1300 milliGRAM(s) Oral three times a day  tamsulosin 0.4 milliGRAM(s) Oral at bedtime  valGANciclovir 450 milliGRAM(s) Oral <User Schedule>  vancomycin    Solution 125 milliGRAM(s) Oral every 6 hours    PRN Inpatient Medications      REVIEW OF SYSTEMS  --------------------------------------------------------------------------------  Gen: No fatigue, fevers/chills, weakness  Skin: No rashes  Head/Eyes/Ears/Mouth: No headache;No sore throat  Respiratory: No dyspnea, cough,   CV: No chest pain, PND, orthopnea  GI: No abdominal pain, diarrhea, constipation, nausea, vomiting  Transplant: No pain  : No increased frequency, dysuria, hematuria, nocturia  MSK: No joint pain/swelling; no back pain; no edema  Neuro: No dizziness/lightheadedness, weakness, seizures, numbness, tingling  Psych: No significant nervousness, anxiety, stress, depression    All other systems were reviewed and are negative, except as noted.    VITALS/PHYSICAL EXAM  --------------------------------------------------------------------------------  T(C): 36.7 (12-26-20 @ 05:00), Max: 36.9 (12-25-20 @ 17:00)  HR: 73 (12-26-20 @ 05:00) (73 - 89)  BP: 136/66 (12-26-20 @ 05:00) (123/64 - 166/72)  RR: 18 (12-26-20 @ 05:00) (18 - 18)  SpO2: 94% (12-26-20 @ 05:00) (94% - 100%)  Wt(kg): --        12-25-20 @ 07:01  -  12-26-20 @ 07:00  --------------------------------------------------------  IN: 950 mL / OUT: 2620 mL / NET: -1670 mL      Physical Exam:  	Gen: NAD  	HEENT: PERRL, supple neck, clear oropharynx  	Pulm: CTA B/L  	CV: RRR, S1S2; no rub  	Back: No spinal or CVA tenderness; no sacral edema  	Abd: +BS, soft, nontender/nondistended                      Transplant: No tenderness, swelling  	: No suprapubic tenderness  	UE: Warm, FROM; no edema; no asterixis  	LE: Warm, FROM; no edema  	Neuro: No focal deficits  	Psych: Normal affect and mood  	Skin: Warm, without rashes      LABS/STUDIES  --------------------------------------------------------------------------------              8.3    4.19  >-----------<  171      [12-26-20 @ 06:31]              26.1     136  |  99  |  73  ----------------------------<  194      [12-26-20 @ 06:31]  4.9   |  24  |  4.49        Ca     7.9     [12-26-20 @ 06:31]      Mg     1.9     [12-26-20 @ 06:31]      Phos  6.1     [12-26-20 @ 06:31]    TPro  5.7  /  Alb  2.7  /  TBili  0.1  /  DBili  x   /  AST  21  /  ALT  23  /  AlkPhos  139  [12-26-20 @ 06:31]    PT/INR: PT 12.8 , INR 1.07       [12-26-20 @ 06:31]  PTT: 28.5       [12-26-20 @ 06:31]      Creatinine Trend:  SCr 4.49 [12-26 @ 06:31]  SCr 4.92 [12-25 @ 06:26]  SCr 4.92 [12-24 @ 05:57]  SCr 4.73 [12-23 @ 05:43]  SCr 5.55 [12-22 @ 00:05]        Urinalysis - [12-20-20 @ 10:18]      Color Yellow / Appearance Slightly Turbid / SG 1.010 / pH 6.0      Gluc 500 mg/dL / Ketone Negative  / Bili Negative / Urobili Negative       Blood Moderate / Protein 100 / Leuk Est Large / Nitrite Negative      RBC 9 /  / Hyaline 0 / Gran  / Sq Epi  / Non Sq Epi 0 / Bacteria Few      Iron 160, TIBC Unable to calculate Test Repeated, %sat Unable to calculate Test Repeated      [06-12-20 @ 09:04]  Ferritin 493      [04-29-20 @ 08:20]  HbA1c 6.4      [02-12-20 @ 17:08]  TSH 3.26      [04-26-20 @ 09:47]  Lipid: chol 158, , HDL 44, LDL 93      [08-05-20 @ 08:57]    HBsAg Nonreact      [12-19-20 @ 14:40]  HCV 0.06, Nonreact      [12-19-20 @ 14:40]    C3 Complement 158      [12-17-20 @ 16:43]  C4 Complement 38      [12-17-20 @ 16:43]

## 2020-12-26 NOTE — PROGRESS NOTE ADULT - ATTENDING COMMENTS
s/p OLT 5 months ago. Admitted with MISA, transaminitis  LFT's improved, back to baseline  MISA stable, non-oliguric. Cr improved to 4.5. No need for HD at this time. Lasix held for now until improvement in Cr.   immunosuppression - Cont everolimus 2mg bid, prednisone 5 bid, Cellcept held due to infection  f/u nephrology  encourage ambulation with assistance  cont abx as per ID for osteo and c.diff colitis- both improving. Plan to continue for at least another week.

## 2020-12-26 NOTE — PROGRESS NOTE ADULT - ASSESSMENT
·	MISA  ·	c diff  ·	s/p liver transplant      Plan:  diarrhea resolved  cont po vanco per ID   diet as tolerated  HD per renal   lfts improving   ct noted   care per transplant services greatly appreciated

## 2020-12-26 NOTE — PROGRESS NOTE ADULT - ASSESSMENT
65 yr old man with liver transplant in July 2020 on everolimus and steroids, cellcept on hold due to infection. Right plantar osteo s/p debridement , was on IV cefepime for ~2 weeks s/p graft, appears clean and no signs of infection. Admitted with MISA. Serum creatinine was 1.3-1.5mg/dL on previous admission few weeks ago.     1. Non Oliguric MISA likely due to AIN from cefepime vs ATN in the setting of sepsis- U/A with leukocytes on admission but cultures remain negative. Non obstructive left distal ureteric stone on CT. U/S with enlarged kidneys, no olga hydronephrosis.  Cr has been improving and has good UOP . Last IHD was on 12/22.  will continue to hold off dialysis     2. Fever, now resolved. C diff infection, AIN, Heel Osteomyelitis- Appreciate ID recs. Currently on PO Vancomycin and Meropenam.  3. Anemia - continue retacrit 10,000 units sq weekly   4. Metabolic acidosis - Continue sodium bicarb 1300mg po TID   5.  Liver Transplant Recipient- managed by [facility] surgery and hepatology

## 2020-12-27 LAB
ALBUMIN SERPL ELPH-MCNC: 2.7 G/DL — LOW (ref 3.3–5)
ALBUMIN SERPL ELPH-MCNC: 2.7 G/DL — LOW (ref 3.3–5)
ALP SERPL-CCNC: 144 U/L — HIGH (ref 40–120)
ALP SERPL-CCNC: 161 U/L — HIGH (ref 40–120)
ALT FLD-CCNC: 26 U/L — SIGNIFICANT CHANGE UP (ref 10–45)
ALT FLD-CCNC: 28 U/L — SIGNIFICANT CHANGE UP (ref 10–45)
ANION GAP SERPL CALC-SCNC: 14 MMOL/L — SIGNIFICANT CHANGE UP (ref 5–17)
ANION GAP SERPL CALC-SCNC: 14 MMOL/L — SIGNIFICANT CHANGE UP (ref 5–17)
APTT BLD: 28.9 SEC — SIGNIFICANT CHANGE UP (ref 27.5–35.5)
AST SERPL-CCNC: 27 U/L — SIGNIFICANT CHANGE UP (ref 10–40)
AST SERPL-CCNC: 29 U/L — SIGNIFICANT CHANGE UP (ref 10–40)
BASOPHILS # BLD AUTO: 0.03 K/UL — SIGNIFICANT CHANGE UP (ref 0–0.2)
BASOPHILS NFR BLD AUTO: 0.7 % — SIGNIFICANT CHANGE UP (ref 0–2)
BILIRUB SERPL-MCNC: 0.1 MG/DL — LOW (ref 0.2–1.2)
BILIRUB SERPL-MCNC: 0.1 MG/DL — LOW (ref 0.2–1.2)
BUN SERPL-MCNC: 79 MG/DL — HIGH (ref 7–23)
BUN SERPL-MCNC: 80 MG/DL — HIGH (ref 7–23)
CALCIUM SERPL-MCNC: 7.9 MG/DL — LOW (ref 8.4–10.5)
CALCIUM SERPL-MCNC: 7.9 MG/DL — LOW (ref 8.4–10.5)
CHLORIDE SERPL-SCNC: 100 MMOL/L — SIGNIFICANT CHANGE UP (ref 96–108)
CHLORIDE SERPL-SCNC: 103 MMOL/L — SIGNIFICANT CHANGE UP (ref 96–108)
CO2 SERPL-SCNC: 24 MMOL/L — SIGNIFICANT CHANGE UP (ref 22–31)
CO2 SERPL-SCNC: 24 MMOL/L — SIGNIFICANT CHANGE UP (ref 22–31)
CREAT SERPL-MCNC: 4.14 MG/DL — HIGH (ref 0.5–1.3)
CREAT SERPL-MCNC: 4.32 MG/DL — HIGH (ref 0.5–1.3)
EOSINOPHIL # BLD AUTO: 0.02 K/UL — SIGNIFICANT CHANGE UP (ref 0–0.5)
EOSINOPHIL NFR BLD AUTO: 0.4 % — SIGNIFICANT CHANGE UP (ref 0–6)
EVEROLIMUS, WHOLE BLOOD RESULT: 4.4 NG/ML — SIGNIFICANT CHANGE UP (ref 3–8)
GLUCOSE BLDC GLUCOMTR-MCNC: 131 MG/DL — HIGH (ref 70–99)
GLUCOSE BLDC GLUCOMTR-MCNC: 134 MG/DL — HIGH (ref 70–99)
GLUCOSE BLDC GLUCOMTR-MCNC: 194 MG/DL — HIGH (ref 70–99)
GLUCOSE BLDC GLUCOMTR-MCNC: 272 MG/DL — HIGH (ref 70–99)
GLUCOSE BLDC GLUCOMTR-MCNC: 305 MG/DL — HIGH (ref 70–99)
GLUCOSE SERPL-MCNC: 120 MG/DL — HIGH (ref 70–99)
GLUCOSE SERPL-MCNC: 348 MG/DL — HIGH (ref 70–99)
HADV DNA FLD NAA+PROBE-LOG#: NEGATIVE — SIGNIFICANT CHANGE UP
HCT VFR BLD CALC: 25.7 % — LOW (ref 39–50)
HGB BLD-MCNC: 8.1 G/DL — LOW (ref 13–17)
IMM GRANULOCYTES NFR BLD AUTO: 1.8 % — HIGH (ref 0–1.5)
INR BLD: 1.01 RATIO — SIGNIFICANT CHANGE UP (ref 0.88–1.16)
LYMPHOCYTES # BLD AUTO: 0.65 K/UL — LOW (ref 1–3.3)
LYMPHOCYTES # BLD AUTO: 14.6 % — SIGNIFICANT CHANGE UP (ref 13–44)
MAGNESIUM SERPL-MCNC: 1.9 MG/DL — SIGNIFICANT CHANGE UP (ref 1.6–2.6)
MCHC RBC-ENTMCNC: 28.7 PG — SIGNIFICANT CHANGE UP (ref 27–34)
MCHC RBC-ENTMCNC: 31.5 GM/DL — LOW (ref 32–36)
MCV RBC AUTO: 91.1 FL — SIGNIFICANT CHANGE UP (ref 80–100)
MONOCYTES # BLD AUTO: 0.35 K/UL — SIGNIFICANT CHANGE UP (ref 0–0.9)
MONOCYTES NFR BLD AUTO: 7.9 % — SIGNIFICANT CHANGE UP (ref 2–14)
NEUTROPHILS # BLD AUTO: 3.32 K/UL — SIGNIFICANT CHANGE UP (ref 1.8–7.4)
NEUTROPHILS NFR BLD AUTO: 74.6 % — SIGNIFICANT CHANGE UP (ref 43–77)
NRBC # BLD: 0 /100 WBCS — SIGNIFICANT CHANGE UP (ref 0–0)
PHOSPHATE SERPL-MCNC: 6.2 MG/DL — HIGH (ref 2.5–4.5)
PLATELET # BLD AUTO: 192 K/UL — SIGNIFICANT CHANGE UP (ref 150–400)
POTASSIUM SERPL-MCNC: 5.2 MMOL/L — SIGNIFICANT CHANGE UP (ref 3.5–5.3)
POTASSIUM SERPL-MCNC: 5.5 MMOL/L — HIGH (ref 3.5–5.3)
POTASSIUM SERPL-SCNC: 5.2 MMOL/L — SIGNIFICANT CHANGE UP (ref 3.5–5.3)
POTASSIUM SERPL-SCNC: 5.5 MMOL/L — HIGH (ref 3.5–5.3)
PROT SERPL-MCNC: 5.6 G/DL — LOW (ref 6–8.3)
PROT SERPL-MCNC: 5.7 G/DL — LOW (ref 6–8.3)
PROTHROM AB SERPL-ACNC: 12.1 SEC — SIGNIFICANT CHANGE UP (ref 10.6–13.6)
RBC # BLD: 2.82 M/UL — LOW (ref 4.2–5.8)
RBC # FLD: 13.4 % — SIGNIFICANT CHANGE UP (ref 10.3–14.5)
SODIUM SERPL-SCNC: 138 MMOL/L — SIGNIFICANT CHANGE UP (ref 135–145)
SODIUM SERPL-SCNC: 141 MMOL/L — SIGNIFICANT CHANGE UP (ref 135–145)
WBC # BLD: 4.45 K/UL — SIGNIFICANT CHANGE UP (ref 3.8–10.5)
WBC # FLD AUTO: 4.45 K/UL — SIGNIFICANT CHANGE UP (ref 3.8–10.5)

## 2020-12-27 PROCEDURE — 99232 SBSQ HOSP IP/OBS MODERATE 35: CPT

## 2020-12-27 PROCEDURE — 99232 SBSQ HOSP IP/OBS MODERATE 35: CPT | Mod: GC

## 2020-12-27 PROCEDURE — 99233 SBSQ HOSP IP/OBS HIGH 50: CPT

## 2020-12-27 RX ORDER — INSULIN LISPRO 100/ML
10 VIAL (ML) SUBCUTANEOUS
Refills: 0 | Status: DISCONTINUED | OUTPATIENT
Start: 2020-12-27 | End: 2020-12-30

## 2020-12-27 RX ORDER — MAGNESIUM SULFATE 500 MG/ML
1 VIAL (ML) INJECTION ONCE
Refills: 0 | Status: COMPLETED | OUTPATIENT
Start: 2020-12-27 | End: 2020-12-27

## 2020-12-27 RX ADMIN — Medication 6: at 17:11

## 2020-12-27 RX ADMIN — Medication 1300 MILLIGRAM(S): at 13:14

## 2020-12-27 RX ADMIN — Medication 10 UNIT(S): at 17:11

## 2020-12-27 RX ADMIN — Medication 100 GRAM(S): at 13:14

## 2020-12-27 RX ADMIN — Medication 5 MILLIGRAM(S): at 17:10

## 2020-12-27 RX ADMIN — CHLORHEXIDINE GLUCONATE 1 APPLICATION(S): 213 SOLUTION TOPICAL at 06:25

## 2020-12-27 RX ADMIN — Medication 500000 UNIT(S): at 22:11

## 2020-12-27 RX ADMIN — Medication 500000 UNIT(S): at 06:25

## 2020-12-27 RX ADMIN — Medication 125 MILLIGRAM(S): at 17:10

## 2020-12-27 RX ADMIN — Medication 10 UNIT(S): at 12:42

## 2020-12-27 RX ADMIN — MAGNESIUM OXIDE 400 MG ORAL TABLET 400 MILLIGRAM(S): 241.3 TABLET ORAL at 11:26

## 2020-12-27 RX ADMIN — Medication 1300 MILLIGRAM(S): at 20:02

## 2020-12-27 RX ADMIN — Medication 125 MILLIGRAM(S): at 22:12

## 2020-12-27 RX ADMIN — Medication 5 MILLIGRAM(S): at 06:25

## 2020-12-27 RX ADMIN — HEPARIN SODIUM 5000 UNIT(S): 5000 INJECTION INTRAVENOUS; SUBCUTANEOUS at 17:10

## 2020-12-27 RX ADMIN — Medication 81 MILLIGRAM(S): at 11:25

## 2020-12-27 RX ADMIN — Medication 30 MILLIGRAM(S): at 06:25

## 2020-12-27 RX ADMIN — Medication 125 MILLIGRAM(S): at 06:25

## 2020-12-27 RX ADMIN — EVEROLIMUS 2 MILLIGRAM(S): 10 TABLET ORAL at 07:55

## 2020-12-27 RX ADMIN — FAMOTIDINE 40 MILLIGRAM(S): 10 INJECTION INTRAVENOUS at 11:25

## 2020-12-27 RX ADMIN — TAMSULOSIN HYDROCHLORIDE 0.4 MILLIGRAM(S): 0.4 CAPSULE ORAL at 20:02

## 2020-12-27 RX ADMIN — Medication 7 UNIT(S): at 07:54

## 2020-12-27 RX ADMIN — Medication 500000 UNIT(S): at 11:26

## 2020-12-27 RX ADMIN — MEROPENEM 100 MILLIGRAM(S): 1 INJECTION INTRAVENOUS at 06:25

## 2020-12-27 RX ADMIN — Medication 8: at 07:54

## 2020-12-27 RX ADMIN — Medication 500000 UNIT(S): at 17:10

## 2020-12-27 RX ADMIN — Medication 125 MILLIGRAM(S): at 11:26

## 2020-12-27 RX ADMIN — INSULIN GLARGINE 18 UNIT(S): 100 INJECTION, SOLUTION SUBCUTANEOUS at 22:11

## 2020-12-27 RX ADMIN — MEROPENEM 100 MILLIGRAM(S): 1 INJECTION INTRAVENOUS at 17:10

## 2020-12-27 RX ADMIN — EVEROLIMUS 2 MILLIGRAM(S): 10 TABLET ORAL at 20:03

## 2020-12-27 RX ADMIN — HEPARIN SODIUM 5000 UNIT(S): 5000 INJECTION INTRAVENOUS; SUBCUTANEOUS at 06:25

## 2020-12-27 RX ADMIN — Medication 1300 MILLIGRAM(S): at 06:25

## 2020-12-27 NOTE — PROGRESS NOTE ADULT - ASSESSMENT
65M with PMHx of IDDM, HLD, obesity, HFpEF with mild LV diastolic dysfunction, MGUS, chronic anemia with a history of duodenal ulcer as well as GAVE and duodenal AVM s/p APC (last on 10/11/19), decompensated JEAN/Cirrhosis 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, post op course c/b VRE bacteremia tx with linezolid and delirium likely in setting of CNI toxicity (FK discontinued/Everolimus initiated 7/27). Recent admission from 11/20-12/2 for OM of the R heel, s/p debridement with Podiatry, and was discharged with Cefepime x 6 weeks via PICC line (placed 12/1). Initial wound cx on admission grew Pseudomonas, OR cx grew staph epi. Was also found with neutropenia and Valcyte was reduced (was on 900mg daily CMV +/-) Sent to ED from clinic with rising transaminitis and MISA (SCr 3.3 from 1.5). Hospital course c/b C diff colitis and worsening renal function requiring HD.      Impression:   # C. diff: on po vanco since 12/15. Continues to improved from symptom standpoint.  # MISA with c/f uremia:, required HD x 2 12/19 and 12/22,  Kidney slightly enlarged on u/s with labs and clinical picture consistent with AIN although cannot exclude acute tubular necrosis. FeNa and FeUrea suggest possible AIN (from cefepime) Cr has been gradually improving and has good UOP . Shiley to be removed today  ( placed over a week ago) to avoid any new infection source.   # Fever: resolved. ( Last fever 12/22) CT scan unrevealing for source. Possible secondary to AIN. Underlying infection (bladder source?) is on differential. Has cdiff which is resolving. Possible osteo though low suspicion from ID and podiatry teams as patient was on targeted antibiotics with clinical appearance of a healing wound.  # Right foot osteomyelitis:  reviewed by podiatry, appears stable. s/p surgical debridement and graft placement on 11/24 by Podiatry.   Wound culture grew pseudomonas and bone bx on 11/24 growing S. epidermidis - likely contaminant. Had PICC line, now removed. On meropenem  # Otitis externa: on floxin drops  # S/p liver transplant 7/2/20:     Recipient Info:   ABO: A  CMV:  Neg  EBV Positive  Donor:  Donor ID:  [id number] Match: [id number]  Age: 29 ABO:  A1 High Risk:   No COD: Cardiovascular  Anti CMV positive, EBV IgG- positive  HepBcAb-neg, Hepatitis C-DANA- neg, Hepatitis C ab-neg    Recommendations:  - continue with po vanco, meropenum,( to be continued until 01/05/2021)  - immunosuppressives with everolimus and prednisone  - hold bactrim given MISA  - monitor CBC, CMP, INR  - transplant hepatology to follow    Zana Sorensen   Gastroenterology Fellow  Pager: [phone]  Please call answering service [phone] / on-call GI fellow after 5pm and before 8am, and on weekends.

## 2020-12-27 NOTE — PROGRESS NOTE ADULT - ASSESSMENT
65M with PMHx of IDDM, HLD, obesity, HFpEF with mild LV diastolic dysfunction, MGUS, chronic anemia with a history of duodenal ulcer 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  Underwent OLT on 7/2/2020, post op coure c/b VRE bacteremia tx with linezolid and delirium likely in setting of CNI toxicity (FK discontinued/Everolimus initiated 7/27).    Recent admission from 11/20-12/2 for OM of the R heel, s/p debridement with Podiatry, and was discharged with Cefepime x 6 weeks via PICC line (placed 12/1).     Initial wound cx on admission grew Pseudomonas, OR cx grew staph epi.  He now presents with from clinic rising transaminitis and MISA (SCr 3.3 from 1.5)    Hospital course c/b C. Diff colitis and worsening renal function, requiring HD    [] ID:   - Afebrile, last fever 12/22.  bld/urine cxs negative to date  - C. Diff colitis: continue Vanco PO q6hr until 12/28, then decrease to bid while on meropenem (end date of meropenem: 1/5)  - R heel OM: s/p debridement by podiatry last admission; wound healing, no signs on infection, continue meropenem until 1/5   - CMV +/- :  Valcyte 450mg TIW (renally adjusted), CMV PCR neg from 12/15. will send repeat weekly,   - R ear otitis externa with chronic hearing loss: Completed Ofloxacin per ENT  - TTE w/o obvious evidence of endocarditis     [] Non-oliguric MISA   - Improving MISA with associated uremic encephelopathy, requiring HD (12/19 and 12/22)   - SCr 4.3 from 4.9, BERNICE vick in place   -K 5.5 this AM, repeat BMP at noon  - Anemia:  Procrit 10K sq weekly    [] s/p OLT with transaminitis  - Everolimus 2/2, MMF on hold since last admission in setting of OM; Pred 5/5  - PPx: Valcyte renally dosed, off Bactrim  - Liver doppler with patent vessels   - LFTs stable    [] DM  - diabetic diet  -on Lantus 18U, premeal lispro 7U, ISS  - tight glycemic control via Lantus/Lispro.  this AM, given AM lispro 7U+8U from ISS, will monitor FS throughout day and increase coverage PRN     65M with PMHx of IDDM, HLD, obesity, HFpEF with mild LV diastolic dysfunction, MGUS, chronic anemia with a history of duodenal ulcer 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  Underwent OLT on 7/2/2020, post op coure c/b VRE bacteremia tx with linezolid and delirium likely in setting of CNI toxicity (FK discontinued/Everolimus initiated 7/27).    Recent admission from 11/20-12/2 for OM of the R heel, s/p debridement with Podiatry, and was discharged with Cefepime x 6 weeks via PICC line (placed 12/1).     Initial wound cx on admission grew Pseudomonas, OR cx grew staph epi.  He now presents with from clinic rising transaminitis and MISA (SCr 3.3 from 1.5)    Hospital course c/b C. Diff colitis and worsening renal function, requiring HD    [] ID:   - Afebrile, last fever 12/22.  bld/urine cxs negative to date  - C. Diff colitis: continue Vanco PO q6hr until 12/28, then decrease to bid while on meropenem (end date of meropenem: 1/5)  - R heel OM: s/p debridement by podiatry last admission; wound healing, no signs on infection, continue meropenem until 1/5   - CMV +/- :  Valcyte 450mg TIW (renally adjusted), CMV PCR neg from 12/15. will send repeat weekly,   - R ear otitis externa with chronic hearing loss: Completed Ofloxacin per ENT  - TTE w/o obvious evidence of endocarditis     [] Non-oliguric MISA   - Improving MISA with associated uremic encephelopathy, requiring HD (12/19 and 12/22)   - SCr 4.3 from 4.9, Plan to remove BERNICE vick today.  -K 5.5 this AM, repeat BMP at noon  - Anemia:  Procrit 10K sq weekly    [] s/p OLT with transaminitis  - Everolimus 2/2, MMF on hold since last admission in setting of OM; Pred 5/5  - PPx: Valcyte renally dosed, off Bactrim  - Liver doppler with patent vessels   - LFTs stable    [] DM  - diabetic diet  - this morning, continue Lantus 18 and ISS, increase pre-meal lispro to 10U.

## 2020-12-27 NOTE — PROGRESS NOTE ADULT - ATTENDING COMMENTS
s/p OLT 5 months ago. Admitted with MISA, transaminitis  LFT's improved, back to baseline  MISA stable, non-oliguric. Cr continues to improve, down to 4.3. No need for HD at this time. Lasix held for now until improvement in Cr. Still making 3L urine  immunosuppression - Cont everolimus 2mg bid, prednisone 5 bid, Cellcept held due to infection  f/u nephrology  encourage ambulation with assistance  cont abx as per ID for osteo and c.diff colitis- both improving. Plan to continue for at least another week. s/p OLT 5 months ago. Admitted with MISA, transaminitis  LFT's improved, back to baseline  MISA stable, non-oliguric. Cr continues to improve, down to 4.3. No need for HD at this time. Lasix held for now until improvement in Cr. Still making 3L urine  immunosuppression - Cont everolimus 2mg bid, prednisone 5 bid, Cellcept held due to infection  f/u nephrology  encourage ambulation with assistance  cont abx as per ID for osteo and c.diff colitis- both improving. Plan to continue for at least another week

## 2020-12-27 NOTE — PROGRESS NOTE ADULT - PROVIDER SPECIALTY LIST ADULT
Requesting [facility]  LOV: 4/30/19  RTC: one month  Last Relevant Labs: na  Filled: 5/20/19 #30 with 2 refills    No upcoming appt with Marli Barnes  My chart sent Transplant Surgery

## 2020-12-27 NOTE — PROGRESS NOTE ADULT - SUBJECTIVE AND OBJECTIVE BOX
[facility] DIVISION OF KIDNEY DISEASES AND HYPERTENSION -- FOLLOW UP NOTE  --------------------------------------------------------------------------------  Chief Complaint:    24 hour events/subjective:  Patient was seen and examined at bedside  has remained afebrile   excellent UOP - 2.7 L in the last 24 hrs, Cr slowly trending down- 4.3 today       PAST HISTORY  --------------------------------------------------------------------------------  No significant changes to PMH, PSH, FHx, SHx, unless otherwise noted    ALLERGIES & MEDICATIONS  --------------------------------------------------------------------------------  Allergies    codeine (Anaphylaxis)    Intolerances      Standing Inpatient Medications  aspirin  chewable 81 milliGRAM(s) Oral daily  chlorhexidine 2% Cloths 1 Application(s) Topical <User Schedule>  epoetin barry-epbx (RETACRIT) Injectable 44252 Unit(s) IV Push every 7 days  everolimus (ZORTRESS) 2 milliGRAM(s) Oral <User Schedule>  everolimus (ZORTRESS) 2 milliGRAM(s) Oral <User Schedule>  famotidine    Tablet 40 milliGRAM(s) Oral daily  heparin   Injectable 5000 Unit(s) SubCutaneous every 12 hours  insulin glargine Injectable (LANTUS) 18 Unit(s) SubCutaneous at bedtime  insulin lispro (ADMELOG) corrective regimen sliding scale   SubCutaneous three times a day before meals  insulin lispro (ADMELOG) corrective regimen sliding scale   SubCutaneous at bedtime  insulin lispro Injectable (ADMELOG) 10 Unit(s) SubCutaneous three times a day before meals  magnesium oxide 400 milliGRAM(s) Oral daily  meropenem  IVPB 500 milliGRAM(s) IV Intermittent every 12 hours  NIFEdipine XL 30 milliGRAM(s) Oral daily  nystatin    Suspension 718485 Unit(s) Oral four times a day  predniSONE   Tablet 5 milliGRAM(s) Oral two times a day  sodium bicarbonate 1300 milliGRAM(s) Oral three times a day  tamsulosin 0.4 milliGRAM(s) Oral at bedtime  valGANciclovir 450 milliGRAM(s) Oral <User Schedule>  vancomycin    Solution 125 milliGRAM(s) Oral every 6 hours    PRN Inpatient Medications      REVIEW OF SYSTEMS  --------------------------------------------------------------------------------  Gen: No fatigue, fevers/chills, weakness  Skin: No rashes  Head/Eyes/Ears/Mouth: No headache;No sore throat  Respiratory: No dyspnea, cough,   CV: No chest pain, PND, orthopnea  GI: No abdominal pain, diarrhea, constipation, nausea, vomiting  Transplant: No pain  : No increased frequency, dysuria, hematuria, nocturia  MSK: No joint pain/swelling; no back pain; no edema  Neuro: No dizziness/lightheadedness, weakness, seizures, numbness, tingling  Psych: No significant nervousness, anxiety, stress, depression    All other systems were reviewed and are negative, except as noted.    VITALS/PHYSICAL EXAM  --------------------------------------------------------------------------------  T(C): 36.6 (12-27-20 @ 06:18), Max: 37.1 (12-26-20 @ 17:00)  HR: 86 (12-27-20 @ 06:18) (71 - 88)  BP: 155/79 (12-27-20 @ 06:18) (129/68 - 155/79)  RR: 19 (12-27-20 @ 06:18) (18 - 19)  SpO2: 100% (12-27-20 @ 06:18) (96% - 100%)  Wt(kg): --        12-26-20 @ 07:01  -  12-27-20 @ 07:00  --------------------------------------------------------  IN: 1110 mL / OUT: 2900 mL / NET: -1790 mL      Physical Exam:  	Gen: NAD  	HEENT: PERRL, supple neck, clear oropharynx  	Pulm: CTA B/L  	CV: RRR, S1S2; no rub  	Back: No spinal or CVA tenderness; no sacral edema  	Abd: +BS, soft, nontender/nondistended                      Transplant: No tenderness, swelling  	: No suprapubic tenderness  	UE: Warm, FROM; no edema; no asterixis  	LE: Warm, FROM; no edema  	Neuro: No focal deficits  	Psych: Normal affect and mood  	Skin: Warm, without rashes      LABS/STUDIES  --------------------------------------------------------------------------------              8.1    4.45  >-----------<  192      [12-27-20 @ 06:30]              25.7     138  |  100  |  79  ----------------------------<  348      [12-27-20 @ 06:30]  5.5   |  24  |  4.32        Ca     7.9     [12-27-20 @ 06:30]      Mg     1.9     [12-27-20 @ 06:30]      Phos  6.2     [12-27-20 @ 06:30]    TPro  5.7  /  Alb  2.7  /  TBili  0.1  /  DBili  x   /  AST  29  /  ALT  28  /  AlkPhos  161  [12-27-20 @ 06:30]    PT/INR: PT 12.1 , INR 1.01       [12-27-20 @ 06:30]  PTT: 28.9       [12-27-20 @ 06:30]      Creatinine Trend:  SCr 4.32 [12-27 @ 06:30]  SCr 4.49 [12-26 @ 06:31]  SCr 4.92 [12-25 @ 06:26]  SCr 4.92 [12-24 @ 05:57]  SCr 4.73 [12-23 @ 05:43]              Urinalysis - [12-20-20 @ 10:18]      Color Yellow / Appearance Slightly Turbid / SG 1.010 / pH 6.0      Gluc 500 mg/dL / Ketone Negative  / Bili Negative / Urobili Negative       Blood Moderate / Protein 100 / Leuk Est Large / Nitrite Negative      RBC 9 /  / Hyaline 0 / Gran  / Sq Epi  / Non Sq Epi 0 / Bacteria Few      Iron 160, TIBC Unable to calculate Test Repeated, %sat Unable to calculate Test Repeated      [06-12-20 @ 09:04]  Ferritin 493      [04-29-20 @ 08:20]  HbA1c 6.4      [02-12-20 @ 17:08]  TSH 3.26      [04-26-20 @ 09:47]  Lipid: chol 158, , HDL 44, LDL 93      [08-05-20 @ 08:57]    HBsAg Nonreact      [12-19-20 @ 14:40]  HCV 0.06, Nonreact      [12-19-20 @ 14:40]    C3 Complement 158      [12-17-20 @ 16:43]  C4 Complement 38      [12-17-20 @ 16:43]

## 2020-12-27 NOTE — PROGRESS NOTE ADULT - ASSESSMENT
65 yr old man with liver transplant in July 2020 on everolimus and steroids, cellcept on hold due to infection. Right plantar osteo s/p debridement , was on IV cefepime for ~2 weeks s/p graft, appears clean and no signs of infection. Admitted with MISA. Serum creatinine was 1.3-1.5mg/dL on previous admission few weeks ago.     1. Non Oliguric MISA likely due to AIN from cefepime vs ATN in the setting of sepsis- U/A with leukocytes on admission but cultures remain negative. Non obstructive left distal ureteric stone on CT. U/S with enlarged kidneys, no olga hydronephrosis.  Cr has been gradually improving and has good UOP . Last IHD was on 12/22.  will continue to hold off dialysis . Shiley can be removed today  ( placed over a week ago) to avoid any new infection source.     2. Fever, now resolved. C diff infection, AIN, Heel Osteomyelitis- Appreciate ID recs. Currently on PO Vancomycin and meropenem ( to be continued till Jan 5th )   3. Anemia - continue retacrit 10,000 units sq weekly   4. Metabolic acidosis - Continue sodium bicarb 1300mg po TID   5.  Liver Transplant Recipient- managed by [facility] surgery and hepatology

## 2020-12-27 NOTE — PROGRESS NOTE ADULT - SUBJECTIVE AND OBJECTIVE BOX
Transplant Surgery - Multidisciplinary Rounds  --------------------------------------------------------------  s/p OLT 7/2/2020      Admitted 12/14/2020 with transaminitis, MISA, diarrhea    Present:  Patient seen and examined during AM rounds with multidisciplinary team including Transplant Surgeon: Dr. Garcia, Transplant Nephrologist: Dr. FAY Patel, OLY Burciaga. Disciplines not in attendance will be notified of the plan.      HPI: 65M with PMHx of IDDM, HLD, obesity, HFpEF with mild LV diastolic dysfunction, MGUS, chronic anemia with a history of duodenal ulcer 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  Underwent OLT on 7/2/2020, post op course c/b VRE bacteremia tx with linezolid and delirium likely in setting of CNI toxicity (FK discontinued/Everolimus initiated 7/27).    Recent admission from 11/20-12/2 for OM of the R heel, s/p debridement with Podiatry, and was discharged with Cefepime x 6 weeks via PICC line (placed 12/1). Initial wound cx on admission grew Pseudomonas, OR cx grew staph epi. Was also found with neutropenia and Valcyte was reduced (was on 900mg daily CMV +/-).      Sent to ED from clinic with rising transaminitis and MISA (SCr 3.3 from 1.5). Hospital course c/b C diff colitis and worsening renal function requiring HD.     Interval Events:  - afebrile, stable VSS. Last episode of fever on 12/22 with neg bld cx to date  - Renal function: SCr downtrending to 4.32 from 4.49), BUN up to 79 from 73. Required HD x 2 12/19 and 12/22, u/o improving 2.9L. K 5.5 this AM  - BP better controlled; started on Nifedipine XL 30mg daily   -Alk phos uptrended to 161 from 139. AST 29, ALT 28, Tbili 0.1.    Potential Discharge date: pending clinical improvement     Education:  Medications    Plan of care:  See Below       MEDICATIONS  (STANDING):  aspirin  chewable 81 milliGRAM(s) Oral daily  chlorhexidine 2% Cloths 1 Application(s) Topical <User Schedule>  epoetin barry-epbx (RETACRIT) Injectable 40245 Unit(s) IV Push every 7 days  everolimus (ZORTRESS) 2 milliGRAM(s) Oral <User Schedule>  everolimus (ZORTRESS) 2 milliGRAM(s) Oral <User Schedule>  famotidine    Tablet 40 milliGRAM(s) Oral daily  heparin   Injectable 5000 Unit(s) SubCutaneous every 12 hours  insulin glargine Injectable (LANTUS) 18 Unit(s) SubCutaneous at bedtime  insulin lispro (ADMELOG) corrective regimen sliding scale   SubCutaneous three times a day before meals  insulin lispro (ADMELOG) corrective regimen sliding scale   SubCutaneous at bedtime  insulin lispro Injectable (ADMELOG) 7 Unit(s) SubCutaneous three times a day before meals  magnesium oxide 400 milliGRAM(s) Oral daily  meropenem  IVPB 500 milliGRAM(s) IV Intermittent every 12 hours  NIFEdipine XL 30 milliGRAM(s) Oral daily  nystatin    Suspension 209578 Unit(s) Oral four times a day  predniSONE   Tablet 5 milliGRAM(s) Oral two times a day  sodium bicarbonate 1300 milliGRAM(s) Oral three times a day  tamsulosin 0.4 milliGRAM(s) Oral at bedtime  valGANciclovir 450 milliGRAM(s) Oral <User Schedule>  vancomycin    Solution 125 milliGRAM(s) Oral every 6 hours    MEDICATIONS  (PRN):      PAST MEDICAL & SURGICAL HISTORY:  GIB (gastrointestinal bleeding)    GERD with esophagitis  Gastritis &amp; Non Bleeding Ulcers    Hepatic encephalopathy    Obesity    Fatty liver disease, nonalcoholic    Renal stones  25 years ago    Hypertension    Neuropathy    Hypercholesteremia    Diabetes    S/P cholecystectomy        Vital Signs Last 24 Hrs  T(C): 36.7 (26 Dec 2020 05:00), Max: 36.9 (25 Dec 2020 09:00)  T(F): 98 (26 Dec 2020 05:00), Max: 98.5 (25 Dec 2020 09:00)  HR: 73 (26 Dec 2020 05:00) (73 - 89)  BP: 136/66 (26 Dec 2020 05:00) (119/59 - 166/72)  BP(mean): --  RR: 18 (26 Dec 2020 05:00) (18 - 18)  SpO2: 94% (26 Dec 2020 05:00) (94% - 100%)    I&O's Summary    25 Dec 2020 07:01  -  26 Dec 2020 07:00  --------------------------------------------------------  IN: 950 mL / OUT: 2620 mL / NET: -1670 mL                              8.3    4.19  )-----------( 171      ( 26 Dec 2020 06:31 )             26.1     12-26    136  |  99  |  73<H>  ----------------------------<  194<H>  4.9   |  24  |  4.49<H>    Ca    7.9<L>      26 Dec 2020 06:31  Phos  6.1     12-26  Mg     1.9     12-26    TPro  5.7<L>  /  Alb  2.7<L>  /  TBili  0.1<L>  /  DBili  x   /  AST  21  /  ALT  23  /  AlkPhos  139<H>  12-26      MEDICATIONS  (STANDING):  aspirin  chewable 81 milliGRAM(s) Oral daily  chlorhexidine 2% Cloths 1 Application(s) Topical <User Schedule>  epoetin barry-epbx (RETACRIT) Injectable 22370 Unit(s) IV Push every 7 days  everolimus (ZORTRESS) 2 milliGRAM(s) Oral <User Schedule>  everolimus (ZORTRESS) 2 milliGRAM(s) Oral <User Schedule>  famotidine    Tablet 40 milliGRAM(s) Oral daily  heparin   Injectable 5000 Unit(s) SubCutaneous every 12 hours  insulin glargine Injectable (LANTUS) 18 Unit(s) SubCutaneous at bedtime  insulin lispro (ADMELOG) corrective regimen sliding scale   SubCutaneous three times a day before meals  insulin lispro (ADMELOG) corrective regimen sliding scale   SubCutaneous at bedtime  insulin lispro Injectable (ADMELOG) 7 Unit(s) SubCutaneous three times a day before meals  magnesium oxide 400 milliGRAM(s) Oral daily  meropenem  IVPB 500 milliGRAM(s) IV Intermittent every 12 hours  NIFEdipine XL 30 milliGRAM(s) Oral daily  nystatin    Suspension 022614 Unit(s) Oral four times a day  predniSONE   Tablet 5 milliGRAM(s) Oral two times a day  sodium bicarbonate 1300 milliGRAM(s) Oral three times a day  tamsulosin 0.4 milliGRAM(s) Oral at bedtime  valGANciclovir 450 milliGRAM(s) Oral <User Schedule>  vancomycin    Solution 125 milliGRAM(s) Oral every 6 hours    MEDICATIONS  (PRN):      PAST MEDICAL & SURGICAL HISTORY:  GIB (gastrointestinal bleeding)    GERD with esophagitis  Gastritis &amp; Non Bleeding Ulcers    Hepatic encephalopathy    Obesity    Fatty liver disease, nonalcoholic    Renal stones  25 years ago    Hypertension    Neuropathy    Hypercholesteremia    Diabetes    S/P cholecystectomy        Vital Signs Last 24 Hrs  T(C): 36.6 (27 Dec 2020 06:18), Max: 37.1 (26 Dec 2020 09:00)  T(F): 97.8 (27 Dec 2020 06:18), Max: 98.8 (26 Dec 2020 17:00)  HR: 86 (27 Dec 2020 06:18) (71 - 88)  BP: 155/79 (27 Dec 2020 06:18) (129/68 - 155/79)  BP(mean): --  RR: 19 (27 Dec 2020 06:18) (18 - 19)  SpO2: 100% (27 Dec 2020 06:18) (96% - 100%)    I&O's Summary    26 Dec 2020 07:01  -  27 Dec 2020 07:00  --------------------------------------------------------  IN: 1110 mL / OUT: 2900 mL / NET: -1790 mL                              8.1    4.45  )-----------( 192      ( 27 Dec 2020 06:30 )             25.7     12-27    138  |  100  |  79<H>  ----------------------------<  348<H>  5.5<H>   |  24  |  4.32<H>    Ca    7.9<L>      27 Dec 2020 06:30  Phos  6.2     12-27  Mg     1.9     12-27    TPro  5.7<L>  /  Alb  2.7<L>  /  TBili  0.1<L>  /  DBili  x   /  AST  29  /  ALT  28  /  AlkPhos  161<H>  12-27          Babesia microti PCR, Blood. (collected 12-25-20 @ 09:01)  Source: .Blood  Final Report (12-25-20 @ 12:15):    Babesia microti PCR    Results: NOT detected    ***************Result Note*************    The detection of Babesia microti by PCR has only been    validated for whole blood; this test has not been approved    by the US Food and Drug Administration (FDA). Performance    characteristics of this assay have been determined by    [employer]. The clinical significance    of results should be considered in conjunction with the    overall clinical presentation of the patient. Result is not    intended to be used as the sole means for clinical diagnosis    or patient management decisions.    One negative sample does not necessarily rule    out the presence of a parasitic infection.    Culture - Blood (collected 12-20-20 @ 11:52)  Source: .Blood Blood-Peripheral  Final Report (12-25-20 @ 12:00):    No Growth Final    Culture - Blood (collected 12-20-20 @ 11:52)  Source: .Blood Blood-Peripheral  Final Report (12-25-20 @ 12:00):    No Growth Final        Review of systems  Gen: afebrile  Skin:  R heel wound   Head/Eyes/Ears/Mouth: No headache; Normal hearing; Normal vision w/o blurriness; No sinus pain/discomfort, sore throat  Respiratory: No dyspnea, cough, wheezing, hemoptysis  CV: No chest pain, PND, orthopnea  GI:  denies abdominal pain, diarrhea, constipation, nausea, vomiting, melena, hematochezia  : No increased frequency, dysuria, hematuria, nocturia  MSK: No joint pain/swelling; no back pain; no edema  Neuro: No dizziness/lightheadedness, weakness, seizures, numbness, tingling  Heme: No easy bruising or bleeding  Endo: No heat/cold intolerance  Psych: No significant nervousness, anxiety, stress, depression  All other systems were reviewed and are negative, except as noted.      PHYSICAL EXAM:  Constitutional: Well developed / well nourished  Eyes: Anicteric, PERRLA  ENMT: nc/at  Respiratory: CTA B/L  Cardiovascular: RRR, + heart murmur  Gastrointestinal: Soft, mild distention, non tender, incision well healed  Genitourinary: Voiding spontaneously  Extremities: SCD's in place and working bilaterally. no edema  Vascular: Palpable dp pulses bilaterally  Neurological: A&O x3  Skin: R heel ulcer: no drainage, no cellulitis  Musculoskeletal: Moving all extremities  Psychiatric: Responsive         Transplant Surgery - Multidisciplinary Rounds  --------------------------------------------------------------  s/p OLT 7/2/2020      Admitted 12/14/2020 with transaminitis, MISA, diarrhea    Present:  Patient seen and examined during AM rounds with multidisciplinary team including Transplant Surgeon: Dr. Garcia, Transplant Nephrologist: Dr. FAY Patel, OLY Burciaga. Disciplines not in attendance will be notified of the plan.      HPI: 65M with PMHx of IDDM, HLD, obesity, HFpEF with mild LV diastolic dysfunction, MGUS, chronic anemia with a history of duodenal ulcer 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  Underwent OLT on 7/2/2020, post op course c/b VRE bacteremia tx with linezolid and delirium likely in setting of CNI toxicity (FK discontinued/Everolimus initiated 7/27).    Recent admission from 11/20-12/2 for OM of the R heel, s/p debridement with Podiatry, and was discharged with Cefepime x 6 weeks via PICC line (placed 12/1). Initial wound cx on admission grew Pseudomonas, OR cx grew staph epi. Was also found with neutropenia and Valcyte was reduced (was on 900mg daily CMV +/-).      Sent to ED from clinic with rising transaminitis and MISA (SCr 3.3 from 1.5). Hospital course c/b C diff colitis and worsening renal function requiring HD.     Interval Events:  - afebrile, stable VSS. Last episode of fever on 12/22 with neg bld cx to date  - Renal function: SCr downtrending to 4.32 from 4.49), BUN up to 79 from 73. Required HD x 2 12/19 and 12/22, u/o improving 2.9L. K 5.5 this AM  - BP better controlled; started on Nifedipine XL 30mg daily   -Alk phos uptrended to 161 from 139. AST 29, ALT 28, Tbili 0.1.    Potential Discharge date: pending clinical improvement     Education:  Medications    Plan of care:  See Below       MEDICATIONS  (STANDING):  aspirin  chewable 81 milliGRAM(s) Oral daily  chlorhexidine 2% Cloths 1 Application(s) Topical <User Schedule>  epoetin barry-epbx (RETACRIT) Injectable 72195 Unit(s) IV Push every 7 days  everolimus (ZORTRESS) 2 milliGRAM(s) Oral <User Schedule>  everolimus (ZORTRESS) 2 milliGRAM(s) Oral <User Schedule>  famotidine    Tablet 40 milliGRAM(s) Oral daily  heparin   Injectable 5000 Unit(s) SubCutaneous every 12 hours  insulin glargine Injectable (LANTUS) 18 Unit(s) SubCutaneous at bedtime  insulin lispro (ADMELOG) corrective regimen sliding scale   SubCutaneous three times a day before meals  insulin lispro (ADMELOG) corrective regimen sliding scale   SubCutaneous at bedtime  insulin lispro Injectable (ADMELOG) 7 Unit(s) SubCutaneous three times a day before meals  magnesium oxide 400 milliGRAM(s) Oral daily  meropenem  IVPB 500 milliGRAM(s) IV Intermittent every 12 hours  NIFEdipine XL 30 milliGRAM(s) Oral daily  nystatin    Suspension 199243 Unit(s) Oral four times a day  predniSONE   Tablet 5 milliGRAM(s) Oral two times a day  sodium bicarbonate 1300 milliGRAM(s) Oral three times a day  tamsulosin 0.4 milliGRAM(s) Oral at bedtime  valGANciclovir 450 milliGRAM(s) Oral <User Schedule>  vancomycin    Solution 125 milliGRAM(s) Oral every 6 hours    MEDICATIONS  (PRN):      PAST MEDICAL & SURGICAL HISTORY:  GIB (gastrointestinal bleeding)    GERD with esophagitis  Gastritis &amp; Non Bleeding Ulcers    Hepatic encephalopathy    Obesity    Fatty liver disease, nonalcoholic    Renal stones  25 years ago    Hypertension    Neuropathy    Hypercholesteremia    Diabetes    S/P cholecystectomy        Vital Signs Last 24 Hrs  T(C): 36.7 (26 Dec 2020 05:00), Max: 36.9 (25 Dec 2020 09:00)  T(F): 98 (26 Dec 2020 05:00), Max: 98.5 (25 Dec 2020 09:00)  HR: 73 (26 Dec 2020 05:00) (73 - 89)  BP: 136/66 (26 Dec 2020 05:00) (119/59 - 166/72)  BP(mean): --  RR: 18 (26 Dec 2020 05:00) (18 - 18)  SpO2: 94% (26 Dec 2020 05:00) (94% - 100%)    I&O's Summary    25 Dec 2020 07:01  -  26 Dec 2020 07:00  --------------------------------------------------------  IN: 950 mL / OUT: 2620 mL / NET: -1670 mL                              8.3    4.19  )-----------( 171      ( 26 Dec 2020 06:31 )             26.1     12-26    136  |  99  |  73<H>  ----------------------------<  194<H>  4.9   |  24  |  4.49<H>    Ca    7.9<L>      26 Dec 2020 06:31  Phos  6.1     12-26  Mg     1.9     12-26    TPro  5.7<L>  /  Alb  2.7<L>  /  TBili  0.1<L>  /  DBili  x   /  AST  21  /  ALT  23  /  AlkPhos  139<H>  12-26      MEDICATIONS  (STANDING):  aspirin  chewable 81 milliGRAM(s) Oral daily  chlorhexidine 2% Cloths 1 Application(s) Topical <User Schedule>  epoetin barry-epbx (RETACRIT) Injectable 04739 Unit(s) IV Push every 7 days  everolimus (ZORTRESS) 2 milliGRAM(s) Oral <User Schedule>  everolimus (ZORTRESS) 2 milliGRAM(s) Oral <User Schedule>  famotidine    Tablet 40 milliGRAM(s) Oral daily  heparin   Injectable 5000 Unit(s) SubCutaneous every 12 hours  insulin glargine Injectable (LANTUS) 18 Unit(s) SubCutaneous at bedtime  insulin lispro (ADMELOG) corrective regimen sliding scale   SubCutaneous three times a day before meals  insulin lispro (ADMELOG) corrective regimen sliding scale   SubCutaneous at bedtime  insulin lispro Injectable (ADMELOG) 7 Unit(s) SubCutaneous three times a day before meals  magnesium oxide 400 milliGRAM(s) Oral daily  meropenem  IVPB 500 milliGRAM(s) IV Intermittent every 12 hours  NIFEdipine XL 30 milliGRAM(s) Oral daily  nystatin    Suspension 948347 Unit(s) Oral four times a day  predniSONE   Tablet 5 milliGRAM(s) Oral two times a day  sodium bicarbonate 1300 milliGRAM(s) Oral three times a day  tamsulosin 0.4 milliGRAM(s) Oral at bedtime  valGANciclovir 450 milliGRAM(s) Oral <User Schedule>  vancomycin    Solution 125 milliGRAM(s) Oral every 6 hours    MEDICATIONS  (PRN):      PAST MEDICAL & SURGICAL HISTORY:  GIB (gastrointestinal bleeding)    GERD with esophagitis  Gastritis &amp; Non Bleeding Ulcers    Hepatic encephalopathy    Obesity    Fatty liver disease, nonalcoholic    Renal stones  25 years ago    Hypertension    Neuropathy    Hypercholesteremia    Diabetes    S/P cholecystectomy        Vital Signs Last 24 Hrs  T(C): 36.6 (27 Dec 2020 06:18), Max: 37.1 (26 Dec 2020 09:00)  T(F): 97.8 (27 Dec 2020 06:18), Max: 98.8 (26 Dec 2020 17:00)  HR: 86 (27 Dec 2020 06:18) (71 - 88)  BP: 155/79 (27 Dec 2020 06:18) (129/68 - 155/79)  BP(mean): --  RR: 19 (27 Dec 2020 06:18) (18 - 19)  SpO2: 100% (27 Dec 2020 06:18) (96% - 100%)    I&O's Summary    26 Dec 2020 07:01  -  27 Dec 2020 07:00  --------------------------------------------------------  IN: 1110 mL / OUT: 2900 mL / NET: -1790 mL                              8.1    4.45  )-----------( 192      ( 27 Dec 2020 06:30 )             25.7     12-27    138  |  100  |  79<H>  ----------------------------<  348<H>  5.5<H>   |  24  |  4.32<H>    Ca    7.9<L>      27 Dec 2020 06:30  Phos  6.2     12-27  Mg     1.9     12-27    TPro  5.7<L>  /  Alb  2.7<L>  /  TBili  0.1<L>  /  DBili  x   /  AST  29  /  ALT  28  /  AlkPhos  161<H>  12-27          Babesia microti PCR, Blood. (collected 12-25-20 @ 09:01)  Source: .Blood  Final Report (12-25-20 @ 12:15):    Babesia microti PCR    Results: NOT detected    ***************Result Note*************    The detection of Babesia microti by PCR has only been    validated for whole blood; this test has not been approved    by the US Food and Drug Administration (FDA). Performance    characteristics of this assay have been determined by    [employer]. The clinical significance    of results should be considered in conjunction with the    overall clinical presentation of the patient. Result is not    intended to be used as the sole means for clinical diagnosis    or patient management decisions.    One negative sample does not necessarily rule    out the presence of a parasitic infection.    Culture - Blood (collected 12-20-20 @ 11:52)  Source: .Blood Blood-Peripheral  Final Report (12-25-20 @ 12:00):    No Growth Final    Culture - Blood (collected 12-20-20 @ 11:52)  Source: .Blood Blood-Peripheral  Final Report (12-25-20 @ 12:00):    No Growth Final        Review of systems  Gen: afebrile  Skin:  R heel wound   Head/Eyes/Ears/Mouth: No headache; Normal hearing; Normal vision w/o blurriness; No sinus pain/discomfort, sore throat  Respiratory: No dyspnea, cough, wheezing, hemoptysis  CV: No chest pain, PND, orthopnea  GI:  denies abdominal pain, diarrhea, constipation, nausea, vomiting, melena, hematochezia  : No increased frequency, dysuria, hematuria, nocturia  MSK: No joint pain/swelling; no back pain; no edema  Neuro: No dizziness/lightheadedness, weakness, seizures, numbness, tingling  Heme: No easy bruising or bleeding  Endo: No heat/cold intolerance  Psych: No significant nervousness, anxiety, stress, depression  All other systems were reviewed and are negative, except as noted.        PHYSICAL EXAM:  Constitutional: Well developed / well nourished  Eyes: Anicteric, PERRLA  ENMT: nc/at  Respiratory: CTA B/L  Cardiovascular: RRR, + heart murmur  Gastrointestinal: Soft, mild distention, non tender, incision well healed  Genitourinary: Voiding spontaneously  Extremities: SCD's in place and working bilaterally. no edema  Vascular: Palpable dp pulses bilaterally  Neurological: A&O x3  Skin: R heel ulcer: no drainage, no cellulitis  Musculoskeletal: Moving all extremities  Psychiatric: Responsive

## 2020-12-27 NOTE — PROGRESS NOTE ADULT - SUBJECTIVE AND OBJECTIVE BOX
Interval Events:   Cr downtrending to 4.32 from 4.49)  no new complaints     Hospital Medications:  aspirin  chewable 81 milliGRAM(s) Oral daily  chlorhexidine 2% Cloths 1 Application(s) Topical <User Schedule>  epoetin barry-epbx (RETACRIT) Injectable 35010 Unit(s) IV Push every 7 days  everolimus (ZORTRESS) 2 milliGRAM(s) Oral <User Schedule>  everolimus (ZORTRESS) 2 milliGRAM(s) Oral <User Schedule>  famotidine    Tablet 40 milliGRAM(s) Oral daily  heparin   Injectable 5000 Unit(s) SubCutaneous every 12 hours  insulin glargine Injectable (LANTUS) 18 Unit(s) SubCutaneous at bedtime  insulin lispro (ADMELOG) corrective regimen sliding scale   SubCutaneous at bedtime  insulin lispro (ADMELOG) corrective regimen sliding scale   SubCutaneous three times a day before meals  insulin lispro Injectable (ADMELOG) 10 Unit(s) SubCutaneous three times a day before meals  magnesium oxide 400 milliGRAM(s) Oral daily  meropenem  IVPB 500 milliGRAM(s) IV Intermittent every 12 hours  NIFEdipine XL 30 milliGRAM(s) Oral daily  nystatin    Suspension 714278 Unit(s) Oral four times a day  predniSONE   Tablet 5 milliGRAM(s) Oral two times a day  sodium bicarbonate 1300 milliGRAM(s) Oral three times a day  tamsulosin 0.4 milliGRAM(s) Oral at bedtime  valGANciclovir 450 milliGRAM(s) Oral <User Schedule>  vancomycin    Solution 125 milliGRAM(s) Oral every 6 hours        ROS:   General:  No fevers, chills or night sweats  ENT:  No sore throat or dysphagia  CV:  No pain or palpitations  Resp:  No dyspnea, cough or  wheezing  GI:  as above  Skin:  No rash or edema  Neuro: no weakness   Hematologic: no bleeding  Musculoskeletal: no muscle pain or join pain  Psych: no agitation      PHYSICAL EXAM:   Vital Signs:  Vital Signs Last 24 Hrs  T(C): 37 (27 Dec 2020 09:00), Max: 37.1 (26 Dec 2020 17:00)  T(F): 98.6 (27 Dec 2020 09:00), Max: 98.8 (26 Dec 2020 17:00)  HR: 88 (27 Dec 2020 09:00) (71 - 88)  BP: 149/76 (27 Dec 2020 09:00) (129/68 - 155/79)  BP(mean): --  RR: 18 (27 Dec 2020 09:00) (18 - 19)  SpO2: 100% (27 Dec 2020 09:00) (96% - 100%)  Daily     Daily     Constitutional: Well developed / well nourished  Eyes: Anicteric, PERRLA  ENMT: nc/at  Respiratory: CTA B/L  Cardiovascular: RRR, + heart murmur  Gastrointestinal: Soft, mild distention, non tender, incision well healed  Genitourinary: Voiding spontaneously  Extremities: SCD's in place and working bilaterally. no edema  Vascular: Palpable dp pulses bilaterally  Neurological: A&O x3  Skin: R heel ulcer: no drainage, no cellulitis  Musculoskeletal: Moving all extremities  Psychiatric: Responsive    LABS:                        8.1    4.45  )-----------( 192      ( 27 Dec 2020 06:30 )             25.7     Mean Cell Volume: 91.1 fl (12-27-20 @ 06:30)    12-27    138  |  100  |  79<H>  ----------------------------<  348<H>  5.5<H>   |  24  |  4.32<H>    Ca    7.9<L>      27 Dec 2020 06:30  Phos  6.2     12-27  Mg     1.9     12-27    TPro  5.7<L>  /  Alb  2.7<L>  /  TBili  0.1<L>  /  DBili  x   /  AST  29  /  ALT  28  /  AlkPhos  161<H>  12-27    LIVER FUNCTIONS - ( 27 Dec 2020 06:30 )  Alb: 2.7 g/dL / Pro: 5.7 g/dL / ALK PHOS: 161 U/L / ALT: 28 U/L / AST: 29 U/L / GGT: x           PT/INR - ( 27 Dec 2020 06:30 )   PT: 12.1 sec;   INR: 1.01 ratio         PTT - ( 27 Dec 2020 06:30 )  PTT:28.9 sec                            8.1    4.45  )-----------( 192      ( 27 Dec 2020 06:30 )             25.7                         8.3    4.19  )-----------( 171      ( 26 Dec 2020 06:31 )             26.1                         8.6    4.63  )-----------( 164      ( 25 Dec 2020 06:26 )             26.7       Imaging:

## 2020-12-27 NOTE — PROGRESS NOTE ADULT - SUBJECTIVE AND OBJECTIVE BOX
[facility]  Ramón Morales PA-C  [address] [place] [address]  [phone]      INTERVAL HPI/OVERNIGHT EVENTS:    overnight events noted   no acute complaints       MEDICATIONS  (STANDING):  aspirin  chewable 81 milliGRAM(s) Oral daily  chlorhexidine 2% Cloths 1 Application(s) Topical daily  epoetin barry-epbx (RETACRIT) Injectable 77731 Unit(s) SubCutaneous every 7 days  everolimus (ZORTRESS) 1.5 milliGRAM(s) Oral <User Schedule>  heparin   Injectable 5000 Unit(s) SubCutaneous every 12 hours  insulin glargine Injectable (LANTUS) 10 Unit(s) SubCutaneous at bedtime  insulin lispro (ADMELOG) corrective regimen sliding scale   SubCutaneous three times a day before meals  insulin lispro (ADMELOG) corrective regimen sliding scale   SubCutaneous at bedtime  insulin lispro Injectable (ADMELOG) 5 Unit(s) SubCutaneous three times a day before meals  insulin lispro Injectable (ADMELOG) 2 Unit(s) SubCutaneous once  magnesium oxide 400 milliGRAM(s) Oral daily  meropenem  IVPB 500 milliGRAM(s) IV Intermittent every 24 hours  nystatin    Suspension 016582 Unit(s) Oral four times a day  ofloxacin 0.3% Solution 10 Drop(s) Right Ear two times a day  pantoprazole    Tablet 40 milliGRAM(s) Oral before breakfast  predniSONE   Tablet 5 milliGRAM(s) Oral two times a day  sodium bicarbonate 1300 milliGRAM(s) Oral three times a day  sodium bicarbonate  Infusion 0.05 mEq/kG/Hr (75 mL/Hr) IV Continuous <Continuous>  tamsulosin 0.4 milliGRAM(s) Oral at bedtime  valGANciclovir 450 milliGRAM(s) Oral <User Schedule>  vancomycin    Solution 125 milliGRAM(s) Oral every 6 hours    MEDICATIONS  (PRN):      Allergies    codeine (Anaphylaxis)    Intolerances        ROS:   General:  No wt loss, No fevers, chills, night sweats, + fatigue,   Eyes:  Good vision, no reported pain  ENT:  No sore throat, pain, runny nose, dysphagia  CV:  No pain, palpitations, hypo/hypertension  Resp:  No dyspnea, cough, tachypnea, wheezing  GI:  No pain, No nausea, No vomiting, No diarrhea, No constipation, No weight loss, No fever, No pruritis, No rectal bleeding, No tarry stools, No dysphagia,  :  No pain, bleeding, incontinence, nocturia  Muscle:  No pain, weakness  Neuro:  No weakness, tingling, memory problems  Psych:  No fatigue, insomnia, mood problems, depression  Endocrine:  No polyuria, polydipsia, cold/heat intolerance  Heme:  No petechiae, ecchymosis, easy bruisability  Skin:  No rash, tattoos, scars, edema      PHYSICAL EXAM:   Vital Signs:  Vital Signs Last 24 Hrs  T(C): 36.5 (19 Dec 2020 15:00), Max: 37.2 (19 Dec 2020 04:00)  T(F): 97.7 (19 Dec 2020 15:00), Max: 99 (19 Dec 2020 04:00)  HR: 75 (19 Dec 2020 15:00) (75 - 91)  BP: 148/68 (19 Dec 2020 15:00) (118/81 - 163/70)  BP(mean): 95 (19 Dec 2020 15:00) (90 - 106)  RR: 20 (19 Dec 2020 15:00) (16 - 20)  SpO2: 96% (19 Dec 2020 15:00) (92% - 98%)  Daily     Daily Weight in k.8 (18 Dec 2020 20:35)    GENERAL:  no distress  HEENT:  NC/AT  ABDOMEN:  Soft, non-tender, non-distended, normoactive bowel sounds  EXTEREMITIES:  no cyanosis,clubbing or edema  SKIN:  No rash/erythema/ecchymoses/petechiae/wounds/abscess/warm/dry  NEURO:  Alert, oriented, no asterixis, no tremor, no encephalopathy      LABS:                        7.4    4.50  )-----------( 179      ( 19 Dec 2020 05:29 )             22.9     12-    133<L>  |  101  |  92<H>  ----------------------------<  224<H>  4.7   |  16<L>  |  6.94<H>    Ca    8.2<L>      19 Dec 2020 05:29  Phos  4.6       Mg     2.2         TPro  5.8<L>  /  Alb  2.6<L>  /  TBili  0.1<L>  /  DBili  x   /  AST  31  /  ALT  47<H>  /  AlkPhos  222<H>  -    PT/INR - ( 19 Dec 2020 05:29 )   PT: 13.1 sec;   INR: 1.10 ratio         PTT - ( 19 Dec 2020 05:29 )  PTT:26.1 sec  Urinalysis Basic - ( 17 Dec 2020 16:54 )    Color: Light Yellow / Appearance: Slightly Turbid / S.012 / pH: x  Gluc: x / Ketone: Negative  / Bili: Negative / Urobili: Negative   Blood: x / Protein: 100 mg/dL / Nitrite: Negative   Leuk Esterase: Large / RBC: 3 /hpf /  /HPF   Sq Epi: x / Non Sq Epi: 0 /hpf / Bacteria: Negative        RADIOLOGY & ADDITIONAL TESTS:

## 2020-12-28 DIAGNOSIS — I10 ESSENTIAL (PRIMARY) HYPERTENSION: ICD-10-CM

## 2020-12-28 DIAGNOSIS — Z94.4 LIVER TRANSPLANT STATUS: ICD-10-CM

## 2020-12-28 DIAGNOSIS — E11.9 TYPE 2 DIABETES MELLITUS WITHOUT COMPLICATIONS: ICD-10-CM

## 2020-12-28 DIAGNOSIS — F32.1 MAJOR DEPRESSIVE DISORDER, SINGLE EPISODE, MODERATE: ICD-10-CM

## 2020-12-28 DIAGNOSIS — N17.9 ACUTE KIDNEY FAILURE, UNSPECIFIED: ICD-10-CM

## 2020-12-28 DIAGNOSIS — F32.0 MAJOR DEPRESSIVE DISORDER, SINGLE EPISODE, MILD: ICD-10-CM

## 2020-12-28 LAB
ALBUMIN SERPL ELPH-MCNC: 3.1 G/DL — LOW (ref 3.3–5)
ALP SERPL-CCNC: 137 U/L — HIGH (ref 40–120)
ALT FLD-CCNC: 27 U/L — SIGNIFICANT CHANGE UP (ref 10–45)
ANION GAP SERPL CALC-SCNC: 14 MMOL/L — SIGNIFICANT CHANGE UP (ref 5–17)
APPEARANCE UR: CLEAR — SIGNIFICANT CHANGE UP
APTT BLD: 29.2 SEC — SIGNIFICANT CHANGE UP (ref 27.5–35.5)
AST SERPL-CCNC: 19 U/L — SIGNIFICANT CHANGE UP (ref 10–40)
BASOPHILS # BLD AUTO: 0.02 K/UL — SIGNIFICANT CHANGE UP (ref 0–0.2)
BASOPHILS NFR BLD AUTO: 0.5 % — SIGNIFICANT CHANGE UP (ref 0–2)
BILIRUB SERPL-MCNC: 0.2 MG/DL — SIGNIFICANT CHANGE UP (ref 0.2–1.2)
BILIRUB UR-MCNC: NEGATIVE — SIGNIFICANT CHANGE UP
BUN SERPL-MCNC: 73 MG/DL — HIGH (ref 7–23)
CALCIUM SERPL-MCNC: 8.5 MG/DL — SIGNIFICANT CHANGE UP (ref 8.4–10.5)
CHLORIDE SERPL-SCNC: 102 MMOL/L — SIGNIFICANT CHANGE UP (ref 96–108)
CO2 SERPL-SCNC: 25 MMOL/L — SIGNIFICANT CHANGE UP (ref 22–31)
COLOR SPEC: SIGNIFICANT CHANGE UP
CREAT ?TM UR-MCNC: <2 MG/DL — SIGNIFICANT CHANGE UP
CREAT SERPL-MCNC: 4.05 MG/DL — HIGH (ref 0.5–1.3)
DIFF PNL FLD: ABNORMAL
EOSINOPHIL # BLD AUTO: 0.03 K/UL — SIGNIFICANT CHANGE UP (ref 0–0.5)
EOSINOPHIL NFR BLD AUTO: 0.7 % — SIGNIFICANT CHANGE UP (ref 0–6)
EVEROLIMUS, WHOLE BLOOD RESULT: 3 NG/ML — SIGNIFICANT CHANGE UP (ref 3–8)
GLUCOSE BLDC GLUCOMTR-MCNC: 177 MG/DL — HIGH (ref 70–99)
GLUCOSE BLDC GLUCOMTR-MCNC: 192 MG/DL — HIGH (ref 70–99)
GLUCOSE BLDC GLUCOMTR-MCNC: 195 MG/DL — HIGH (ref 70–99)
GLUCOSE BLDC GLUCOMTR-MCNC: 223 MG/DL — HIGH (ref 70–99)
GLUCOSE SERPL-MCNC: 202 MG/DL — HIGH (ref 70–99)
GLUCOSE UR QL: ABNORMAL
HCT VFR BLD CALC: 26.8 % — LOW (ref 39–50)
HGB BLD-MCNC: 8.7 G/DL — LOW (ref 13–17)
IMM GRANULOCYTES NFR BLD AUTO: 1.1 % — SIGNIFICANT CHANGE UP (ref 0–1.5)
INR BLD: 1.04 RATIO — SIGNIFICANT CHANGE UP (ref 0.88–1.16)
KETONES UR-MCNC: NEGATIVE — SIGNIFICANT CHANGE UP
LEUKOCYTE ESTERASE UR-ACNC: ABNORMAL
LYMPHOCYTES # BLD AUTO: 0.73 K/UL — LOW (ref 1–3.3)
LYMPHOCYTES # BLD AUTO: 16.7 % — SIGNIFICANT CHANGE UP (ref 13–44)
MAGNESIUM SERPL-MCNC: 2.1 MG/DL — SIGNIFICANT CHANGE UP (ref 1.6–2.6)
MCHC RBC-ENTMCNC: 29.1 PG — SIGNIFICANT CHANGE UP (ref 27–34)
MCHC RBC-ENTMCNC: 32.5 GM/DL — SIGNIFICANT CHANGE UP (ref 32–36)
MCV RBC AUTO: 89.6 FL — SIGNIFICANT CHANGE UP (ref 80–100)
MONOCYTES # BLD AUTO: 0.38 K/UL — SIGNIFICANT CHANGE UP (ref 0–0.9)
MONOCYTES NFR BLD AUTO: 8.7 % — SIGNIFICANT CHANGE UP (ref 2–14)
NEUTROPHILS # BLD AUTO: 3.15 K/UL — SIGNIFICANT CHANGE UP (ref 1.8–7.4)
NEUTROPHILS NFR BLD AUTO: 72.3 % — SIGNIFICANT CHANGE UP (ref 43–77)
NITRITE UR-MCNC: NEGATIVE — SIGNIFICANT CHANGE UP
NRBC # BLD: 0 /100 WBCS — SIGNIFICANT CHANGE UP (ref 0–0)
PH UR: 6.5 — SIGNIFICANT CHANGE UP (ref 5–8)
PHOSPHATE SERPL-MCNC: 5.8 MG/DL — HIGH (ref 2.5–4.5)
PLATELET # BLD AUTO: 173 K/UL — SIGNIFICANT CHANGE UP (ref 150–400)
POTASSIUM SERPL-MCNC: 5.2 MMOL/L — SIGNIFICANT CHANGE UP (ref 3.5–5.3)
POTASSIUM SERPL-SCNC: 5.2 MMOL/L — SIGNIFICANT CHANGE UP (ref 3.5–5.3)
PROT ?TM UR-MCNC: 37 MG/DL — HIGH (ref 0–12)
PROT SERPL-MCNC: 5.9 G/DL — LOW (ref 6–8.3)
PROT UR-MCNC: ABNORMAL
PROT/CREAT UR-RTO: SIGNIFICANT CHANGE UP (ref 0–0.2)
PROTHROM AB SERPL-ACNC: 12.5 SEC — SIGNIFICANT CHANGE UP (ref 10.6–13.6)
RBC # BLD: 2.99 M/UL — LOW (ref 4.2–5.8)
RBC # FLD: 13.5 % — SIGNIFICANT CHANGE UP (ref 10.3–14.5)
SARS-COV-2 RNA SPEC QL NAA+PROBE: SIGNIFICANT CHANGE UP
SODIUM SERPL-SCNC: 141 MMOL/L — SIGNIFICANT CHANGE UP (ref 135–145)
SP GR SPEC: 1.01 — SIGNIFICANT CHANGE UP (ref 1.01–1.02)
UROBILINOGEN FLD QL: NEGATIVE — SIGNIFICANT CHANGE UP
WBC # BLD: 4.36 K/UL — SIGNIFICANT CHANGE UP (ref 3.8–10.5)
WBC # FLD AUTO: 4.36 K/UL — SIGNIFICANT CHANGE UP (ref 3.8–10.5)

## 2020-12-28 PROCEDURE — 99232 SBSQ HOSP IP/OBS MODERATE 35: CPT

## 2020-12-28 PROCEDURE — 93971 EXTREMITY STUDY: CPT | Mod: 26,RT

## 2020-12-28 PROCEDURE — 90792 PSYCH DIAG EVAL W/MED SRVCS: CPT

## 2020-12-28 RX ADMIN — MAGNESIUM OXIDE 400 MG ORAL TABLET 400 MILLIGRAM(S): 241.3 TABLET ORAL at 13:27

## 2020-12-28 RX ADMIN — Medication 10 UNIT(S): at 08:01

## 2020-12-28 RX ADMIN — Medication 5 MILLIGRAM(S): at 06:38

## 2020-12-28 RX ADMIN — Medication 2: at 18:06

## 2020-12-28 RX ADMIN — HEPARIN SODIUM 5000 UNIT(S): 5000 INJECTION INTRAVENOUS; SUBCUTANEOUS at 18:06

## 2020-12-28 RX ADMIN — Medication 30 MILLIGRAM(S): at 06:37

## 2020-12-28 RX ADMIN — Medication 125 MILLIGRAM(S): at 13:27

## 2020-12-28 RX ADMIN — INSULIN GLARGINE 18 UNIT(S): 100 INJECTION, SOLUTION SUBCUTANEOUS at 22:11

## 2020-12-28 RX ADMIN — Medication 1300 MILLIGRAM(S): at 06:38

## 2020-12-28 RX ADMIN — TAMSULOSIN HYDROCHLORIDE 0.4 MILLIGRAM(S): 0.4 CAPSULE ORAL at 22:11

## 2020-12-28 RX ADMIN — HEPARIN SODIUM 5000 UNIT(S): 5000 INJECTION INTRAVENOUS; SUBCUTANEOUS at 06:38

## 2020-12-28 RX ADMIN — Medication 500000 UNIT(S): at 06:38

## 2020-12-28 RX ADMIN — Medication 81 MILLIGRAM(S): at 13:26

## 2020-12-28 RX ADMIN — Medication 5 MILLIGRAM(S): at 18:07

## 2020-12-28 RX ADMIN — Medication 10 UNIT(S): at 18:05

## 2020-12-28 RX ADMIN — Medication 10 UNIT(S): at 13:43

## 2020-12-28 RX ADMIN — Medication 500000 UNIT(S): at 13:39

## 2020-12-28 RX ADMIN — MEROPENEM 100 MILLIGRAM(S): 1 INJECTION INTRAVENOUS at 06:38

## 2020-12-28 RX ADMIN — EVEROLIMUS 2 MILLIGRAM(S): 10 TABLET ORAL at 20:19

## 2020-12-28 RX ADMIN — MEROPENEM 100 MILLIGRAM(S): 1 INJECTION INTRAVENOUS at 18:55

## 2020-12-28 RX ADMIN — Medication 125 MILLIGRAM(S): at 18:06

## 2020-12-28 RX ADMIN — Medication 2: at 08:01

## 2020-12-28 RX ADMIN — Medication 125 MILLIGRAM(S): at 06:38

## 2020-12-28 RX ADMIN — EVEROLIMUS 2 MILLIGRAM(S): 10 TABLET ORAL at 08:01

## 2020-12-28 RX ADMIN — Medication 4: at 13:44

## 2020-12-28 RX ADMIN — FAMOTIDINE 40 MILLIGRAM(S): 10 INJECTION INTRAVENOUS at 13:27

## 2020-12-28 RX ADMIN — Medication 500000 UNIT(S): at 18:07

## 2020-12-28 NOTE — PROGRESS NOTE ADULT - SUBJECTIVE AND OBJECTIVE BOX
Follow Up:  s/p OLT, Heel OM     Interval History:    REVIEW OF SYSTEMS  [  ] ROS unobtainable because:    [  ] All other systems negative except as noted below    Constitutional:  [ ] fever [ ] chills  [ ] weight loss  [ ] weakness  Skin:  [ ] rash [ ] phlebitis	  Eyes: [ ] icterus [ ] pain  [ ] discharge	  ENMT: [ ] sore throat  [ ] thrush [ ] ulcers [ ] exudates  Respiratory: [ ] dyspnea [ ] hemoptysis [ ] cough [ ] sputum	  Cardiovascular:  [ ] chest pain [ ] palpitations [ ] edema	  Gastrointestinal:  [ ] nausea [ ] vomiting [ ] diarrhea [ ] constipation [ ] pain	  Genitourinary:  [ ] dysuria [ ] frequency [ ] hematuria [ ] discharge [ ] flank pain  [ ] incontinence  Musculoskeletal:  [ ] myalgias [ ] arthralgias [ ] arthritis  [ ] back pain  Neurological:  [ ] headache [ ] seizures  [ ] confusion/altered mental status    Allergies  codeine (Anaphylaxis)        ANTIMICROBIALS:  meropenem  IVPB 500 every 12 hours  nystatin    Suspension 920167 four times a day  valGANciclovir 450 <User Schedule>  vancomycin    Solution 125 every 6 hours      OTHER MEDS:  MEDICATIONS  (STANDING):  aspirin  chewable 81 daily  epoetin barry-epbx (RETACRIT) Injectable 69593 every 7 days  everolimus (ZORTRESS) 2 <User Schedule>  everolimus (ZORTRESS) 2 <User Schedule>  famotidine    Tablet 40 daily  heparin   Injectable 5000 every 12 hours  insulin glargine Injectable (LANTUS) 18 at bedtime  insulin lispro (ADMELOG) corrective regimen sliding scale  at bedtime  insulin lispro (ADMELOG) corrective regimen sliding scale  three times a day before meals  insulin lispro Injectable (ADMELOG) 10 three times a day before meals  NIFEdipine XL 30 daily  predniSONE   Tablet 5 two times a day  tamsulosin 0.4 at bedtime      Vital Signs Last 24 Hrs  T(C): 36.6 (28 Dec 2020 17:06), Max: 36.8 (27 Dec 2020 21:00)  T(F): 97.9 (28 Dec 2020 17:06), Max: 98.3 (27 Dec 2020 21:00)  HR: 83 (28 Dec 2020 17:06) (73 - 83)  BP: 173/75 (28 Dec 2020 17:06) (147/73 - 173/75)  BP(mean): --  RR: 18 (28 Dec 2020 17:06) (18 - 18)  SpO2: 97% (28 Dec 2020 17:06) (97% - 100%)    PHYSICAL EXAMINATION:  General: Alert and Awake, NAD  HEENT: PERRL, EOMI  Neck: Supple  Cardiac: RRR, No M/R/G  Resp: CTAB, No Wh/Rh/Ra  Abdomen: NBS, NT/ND, No HSM, No rigidity or guarding  MSK: No LE edema. No Calf tenderness  : No bob  Skin: No rashes or lesions. Skin is warm and dry to the touch.   Neuro: Alert and Awake. CN 2-12 Grossly intact. Moves all four extremities spontaneously.  Psych: Calm, Pleasant, Cooperative                          8.7    4.36  )-----------( 173      ( 28 Dec 2020 06:06 )             26.8       12    141  |  102  |  73<H>  ----------------------------<  202<H>  5.2   |  25  |  4.05<H>    Ca    8.5      28 Dec 2020 06:06  Phos  5.8       Mg     2.1         TPro  5.9<L>  /  Alb  3.1<L>  /  TBili  0.2  /  DBili  x   /  AST  19  /  ALT  27  /  AlkPhos  137<H>        Urinalysis Basic - ( 28 Dec 2020 11:31 )    Color: Light Yellow / Appearance: Clear / S.013 / pH: x  Gluc: x / Ketone: Negative  / Bili: Negative / Urobili: Negative   Blood: x / Protein: 30 mg/dL / Nitrite: Negative   Leuk Esterase: Large / RBC: 3 /hpf /  /HPF   Sq Epi: x / Non Sq Epi: 0 /hpf / Bacteria: Negative        MICROBIOLOGY:  v  .Blood  20   Babesia microti PCR  Results: NOT detected  ***************Result Note*************  The detection of Babesia microti by PCR has only been  validated for whole blood; this test has not been approved  by the US Food and Drug Administration (FDA). Performance  characteristics of this assay have been determined by  [facility]. The clinical significance  of results should be considered in conjunction with the  overall clinical presentation of the patient. Result is not  intended to be used as the sole means for clinical diagnosis  or patient management decisions.  One negative sample does not necessarily rule  out the presence of a parasitic infection.  --  --      .Blood Blood-Peripheral  20   No Growth Final  --  --      .Urine Catheterized  20   No growth  --  --      .Catheter PICC line tip  12-15-20   < 15 colonies Coag Negative Staphylococcus  --  --      .Urine Clean Catch (Midstream)  12-15-20   <10,000 CFU/mL Normal Urogenital Shantel  --  --      .Blood PICC/PERC Single Lumen  20   No Growth Final  --  --                RADIOLOGY:    <The imaging below has been reviewed and visualized by me independently. Findings as detailed in report below> Follow Up:  s/p OLT, Heel OM     Interval History: afebrile. no acute overnight events. very tearful this AM.     REVIEW OF SYSTEMS  [  ] ROS unobtainable because:    [x  ] All other systems negative except as noted below    Constitutional:  [ ] fever [ ] chills  [ ] weight loss  [ ] weakness  Skin:  [ ] rash [ ] phlebitis	  Eyes: [ ] icterus [ ] pain  [ ] discharge	  ENMT: [ ] sore throat  [ ] thrush [ ] ulcers [ ] exudates  Respiratory: [ ] dyspnea [ ] hemoptysis [ ] cough [ ] sputum	  Cardiovascular:  [ ] chest pain [ ] palpitations [ ] edema	  Gastrointestinal:  [ ] nausea [ ] vomiting [ ] diarrhea [ ] constipation [ ] pain	  Genitourinary:  [ ] dysuria [ ] frequency [ ] hematuria [ ] discharge [ ] flank pain  [ ] incontinence  Musculoskeletal:  [ ] myalgias [ ] arthralgias [ ] arthritis  [ ] back pain  Neurological:  [ ] headache [ ] seizures  [ ] confusion/altered mental status    Allergies  codeine (Anaphylaxis)        ANTIMICROBIALS:  meropenem  IVPB 500 every 12 hours  nystatin    Suspension 089375 four times a day  valGANciclovir 450 <User Schedule>  vancomycin    Solution 125 every 6 hours      OTHER MEDS:  MEDICATIONS  (STANDING):  aspirin  chewable 81 daily  epoetin barry-epbx (RETACRIT) Injectable 60428 every 7 days  everolimus (ZORTRESS) 2 <User Schedule>  everolimus (ZORTRESS) 2 <User Schedule>  famotidine    Tablet 40 daily  heparin   Injectable 5000 every 12 hours  insulin glargine Injectable (LANTUS) 18 at bedtime  insulin lispro (ADMELOG) corrective regimen sliding scale  at bedtime  insulin lispro (ADMELOG) corrective regimen sliding scale  three times a day before meals  insulin lispro Injectable (ADMELOG) 10 three times a day before meals  NIFEdipine XL 30 daily  predniSONE   Tablet 5 two times a day  tamsulosin 0.4 at bedtime      Vital Signs Last 24 Hrs  T(C): 36.6 (28 Dec 2020 17:06), Max: 36.8 (27 Dec 2020 21:00)  T(F): 97.9 (28 Dec 2020 17:06), Max: 98.3 (27 Dec 2020 21:00)  HR: 83 (28 Dec 2020 17:06) (73 - 83)  BP: 173/75 (28 Dec 2020 17:06) (147/73 - 173/75)  BP(mean): --  RR: 18 (28 Dec 2020 17:06) (18 - 18)  SpO2: 97% (28 Dec 2020 17:06) (97% - 100%)    PHYSICAL EXAMINATION:  General: Alert and Awake, NAD  HEENT: PERRL, EOMI  Neck: refusing examination this AM  Cardiac:  refusing examination this AM  Resp:  refusing examination this AM  Abdomen: refusing examination this AM  MSK:  refusing examination this AM  : No bob  Skin:  refusing examination this AM  Neuro: Alert and Awake. CN 2-12 Grossly intact. Moves all four extremities spontaneously.  Psych: Tearful this AM                         8.7    4.36  )-----------( 173      ( 28 Dec 2020 06:06 )             26.8       12    141  |  102  |  73<H>  ----------------------------<  202<H>  5.2   |  25  |  4.05<H>    Ca    8.5      28 Dec 2020 06:06  Phos  5.8       Mg     2.1         TPro  5.9<L>  /  Alb  3.1<L>  /  TBili  0.2  /  DBili  x   /  AST  19  /  ALT  27  /  AlkPhos  137<H>        Urinalysis Basic - ( 28 Dec 2020 11:31 )    Color: Light Yellow / Appearance: Clear / S.013 / pH: x  Gluc: x / Ketone: Negative  / Bili: Negative / Urobili: Negative   Blood: x / Protein: 30 mg/dL / Nitrite: Negative   Leuk Esterase: Large / RBC: 3 /hpf /  /HPF   Sq Epi: x / Non Sq Epi: 0 /hpf / Bacteria: Negative        MICROBIOLOGY:  v  .Blood  20   Babesia microti PCR  Results: NOT detected  ***************Result Note*************  The detection of Babesia microti by PCR has only been  validated for whole blood; this test has not been approved  by the US Food and Drug Administration (FDA). Performance  characteristics of this assay have been determined by  [facility]. The clinical significance  of results should be considered in conjunction with the  overall clinical presentation of the patient. Result is not  intended to be used as the sole means for clinical diagnosis  or patient management decisions.  One negative sample does not necessarily rule  out the presence of a parasitic infection.  --  --      .Blood Blood-Peripheral  20   No Growth Final  --  --      .Urine Catheterized  20   No growth  --  --      .Catheter PICC line tip  12-15-20   < 15 colonies Coag Negative Staphylococcus  --  --      .Urine Clean Catch (Midstream)  12-15-20   <10,000 CFU/mL Normal Urogenital Shantel  --  --      .Blood PICC/PERC Single Lumen  20   No Growth Final  --  --                RADIOLOGY:    <The imaging below has been reviewed and visualized by me independently. Findings as detailed in report below>    EXAM:  CT ABDOMEN AND PELVIS                        EXAM:  CT CHEST                        PROCEDURE DATE:  2020    1. Third spacing, as characterized by pleural effusions, anasarca, ascites.  2. Stable 5 mm stone in the distal left ureter without hydroureteronephrosis.  3. Scattered 1 to 2 mm nodules, new from 2020. Unless the patient is at high risk for malignancy, these do not merit follow-up.

## 2020-12-28 NOTE — PROGRESS NOTE ADULT - ATTENDING COMMENTS
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 DM, HTN, Foot lesion on chronic antibiotics  Patient seen, examined and reviewed available clinical and lab data including history,  progress notes and consult notes.  Reviewed immunosuppression and allograft function including urine out put, creatinine trend, urine studies and kidney, bladder imaging.  Reviewed medication regimen for glycemic control and blood pressure control  Suggestions:  1. Serum immunofixation  Noted prior finding  2. Monitor daily electrolytes, creatinine while admitted  Will monitor renal function recovery  Will follow  I was present during and reviewed clinical and lab data as well as assessment and plan as documented. Please contact if any additional questions with any change in clinical condition or on availability of any additional information or reports.

## 2020-12-28 NOTE — PROGRESS NOTE ADULT - ASSESSMENT
65M with PMHx of IDDM, HLD, obesity, HFpEF with mild LV diastolic dysfunction, MGUS, chronic anemia with a history of duodenal ulcer 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  Underwent OLT on 7/2/2020, post op coure c/b VRE bacteremia tx with linezolid and delirium likely in setting of CNI toxicity (FK discontinued/Everolimus initiated 7/27).    Recent admission from 11/20-12/2 for OM of the R heel, s/p debridement with Podiatry, and was discharged with Cefepime x 6 weeks via PICC line (placed 12/1).     Initial wound cx on admission grew Pseudomonas, OR cx grew staph epi.  He now presents with from clinic rising transaminitis and MISA (SCr 3.3 from 1.5)    Hospital course c/b C. Diff colitis and worsening renal function, requiring HD    [] ID:   - Afebrile, last fever 12/22.  bld/urine cxs negative to date  - C. Diff colitis: decrease PO vanco to bid (while on meropenem)   - R heel OM: s/p debridement by podiatry last admission; wound healing, no signs on infection, continue meropenem until 1/5   - CMV +/- :  Valcyte 450mg TIW (renally adjusted), CMV PCR neg from 12/15. will send repeat weekly,   - R ear otitis externa with chronic hearing loss: Completed Ofloxacin per ENT  - TTE w/o obvious evidence of endocarditis     [] Non-oliguric MISA   - Improving MISA with associated uremic encephelopathy, requiring HD (12/19 and 12/22)   - Anemia:  Procrit 10K sq weekly    [] s/p OLT with transaminitis  - Everolimus 2/2, MMF on hold since last admission in setting of OM; Pred 5/5  - PPx: Valcyte renally dosed, off Bactrim  - Liver doppler with patent vessels   - LFTs stable    [] R LE edema  - r/o DVT; LE doppler pending     [] DM  - diabetic diet  - Lantus 18u qhs, Lispro 10u tid

## 2020-12-28 NOTE — BEHAVIORAL HEALTH ASSESSMENT NOTE - NSBHCHARTREVIEWLAB_PSY_A_CORE FT
CBC Full  -  ( 28 Dec 2020 06:06 )  WBC Count : 4.36 K/uL  RBC Count : 2.99 M/uL  Hemoglobin : 8.7 g/dL  Hematocrit : 26.8 %  Platelet Count - Automated : 173 K/uL  Mean Cell Volume : 89.6 fl  Mean Cell Hemoglobin : 29.1 pg  Mean Cell Hemoglobin Concentration : 32.5 gm/dL  Auto Neutrophil # : 3.15 K/uL  Auto Lymphocyte # : 0.73 K/uL  Auto Monocyte # : 0.38 K/uL  Auto Eosinophil # : 0.03 K/uL  Auto Basophil # : 0.02 K/uL  Auto Neutrophil % : 72.3 %  Auto Lymphocyte % : 16.7 %  Auto Monocyte % : 8.7 %  Auto Eosinophil % : 0.7 %  Auto Basophil % : 0.5 %        141  |  102  |  73<H>  ----------------------------<  202<H>  5.2   |  25  |  4.05<H>    Ca    8.5      28 Dec 2020 06:06  Phos  5.8       Mg     2.1         TPro  5.9<L>  /  Alb  3.1<L>  /  TBili  0.2  /  DBili  x   /  AST  19  /  ALT  27  /  AlkPhos  137<H>      LIVER FUNCTIONS - ( 28 Dec 2020 06:06 )  Alb: 3.1 g/dL / Pro: 5.9 g/dL / ALK PHOS: 137 U/L / ALT: 27 U/L / AST: 19 U/L / GGT: x           Urinalysis Basic - ( 28 Dec 2020 11:31 )    Color: Light Yellow / Appearance: Clear / S.013 / pH: x  Gluc: x / Ketone: Negative  / Bili: Negative / Urobili: Negative   Blood: x / Protein: 30 mg/dL / Nitrite: Negative   Leuk Esterase: Large / RBC: 3 /hpf /  /HPF   Sq Epi: x / Non Sq Epi: 0 /hpf / Bacteria: Negative

## 2020-12-28 NOTE — PROGRESS NOTE ADULT - ASSESSMENT
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, CNI Neurotoxicity), IDDM, hyperlipidemia, obesity, HFpEF with mild LV diastolic dysfunction, MGUS who presented with fevers, diarrhea and abnormal labwork    Of note, recent admission for R Heel Osteomyelitis   s/p right foot heel incision and drainage w/ application of stravix and bone biopsy on 11/24  Superficial cultures with Pseudomonas and Operative Cultures with Coag Neg Staph in fluid media  Discharged on IV Cefepime with plan for 6 week course    On 12/14 labwork was noted to have MISA and Transaminitis  Of note [facility] outpatient labs from 12/9 with Cr of 1.41 and LFT's WNL    U/A (12/15, 12/17, 12/20), with pyuria  UCx (12/15, 12/17) with NGTD  COVID19 PCR (12/14) Negative  RVP / COVID19 PCR (12/17, 12/20) Negative  C diff toxin assay (12/15) indeterminate with Positive C diff PCR  CXR (12/15, 12/21) without pulmonary infiltrates  CT A/P (12/15) with distal left ureteral calculus without hydronephrosis and Diffuse wall thickening, slightly asymmetric on the left but without prostate abscess.   Renal US (12/16) with left collecting system fullness but no hydronephrosis    CT Chest (12/23) with small pulmonary nodules - doubt this is the cause of his fever  CT A/P (12/23) with persistent 5 mm left ureteral stone without hydronephrosis    No obvious etiology for fever/infection on the CT imaging unless the ureteral stone is source however, absence of hydronephrosis argues against this.     Overall, C diff infection, Fever, Transaminitis, MISA, Abnormal Urinalysis, Nephrolithiasis, Heel Osteomyelitis, Liver Transplant Recipient    # Fever / Transaminitis  - Continue Meropenem to 500 mg IV q12 hours  - resolving    # C. diff Infection / Diarrhea  - Continue oral vancomycin 125 mg PO q6 hours (End Date: 12/28/20) followed by: Vancomycin 125 mg PO Q12H (while on Meropenem - End Date: 1/5/20)    # RLE Heel OM  - Continue Meropenem 500 mg IV q12 hours (Tentative End Date: 1/5/2020 - 6 weeks from heel debridement)    # Acute kidney injury / Abnormal Urinalysis / Nephrolithiasis   - Continue Meropenem 500 mg IV q12 hours; now off of Cefepime given ?AIN  - Management of MISA per transplant nephrology    #Liver Transplant Recipient  - Continue Valcyte 450mg PO TIW  - Bactrim on hold due to MISA - can consider adding Mepron    I will continue to follow. Please feel free to contact me with any further questions.    Eusebio Pérez M.D.  [facility] Division of Infectious Disease  8AM-5PM: Pager Number [phone]  After Hours (or if no response): Please contact the Infectious Diseases Office at [phone]     The above assessment and plan were discussed with Dr Chris

## 2020-12-28 NOTE — BEHAVIORAL HEALTH ASSESSMENT NOTE - NSBHCHARTREVIEWVS_PSY_A_CORE FT
Vital Signs Last 24 Hrs  T(C): 36.7 (28 Dec 2020 14:52), Max: 36.8 (27 Dec 2020 21:00)  T(F): 98.1 (28 Dec 2020 14:52), Max: 98.3 (27 Dec 2020 21:00)  HR: 78 (28 Dec 2020 14:52) (73 - 90)  BP: 149/71 (28 Dec 2020 14:52) (147/73 - 164/74)  BP(mean): --  RR: 18 (28 Dec 2020 14:52) (18 - 18)  SpO2: 98% (28 Dec 2020 14:52) (97% - 100%)

## 2020-12-28 NOTE — PROGRESS NOTE ADULT - SUBJECTIVE AND OBJECTIVE BOX
Chief Complaint:  Patient is a 65y old  Male who presents with a chief complaint of transaminitis (27 Dec 2020 11:57)      Interval Events:     Allergies:  codeine (Anaphylaxis)      Hospital Medications:  aspirin  chewable 81 milliGRAM(s) Oral daily  chlorhexidine 2% Cloths 1 Application(s) Topical <User Schedule>  epoetin barry-epbx (RETACRIT) Injectable 59293 Unit(s) IV Push every 7 days  everolimus (ZORTRESS) 2 milliGRAM(s) Oral <User Schedule>  everolimus (ZORTRESS) 2 milliGRAM(s) Oral <User Schedule>  famotidine    Tablet 40 milliGRAM(s) Oral daily  heparin   Injectable 5000 Unit(s) SubCutaneous every 12 hours  insulin glargine Injectable (LANTUS) 18 Unit(s) SubCutaneous at bedtime  insulin lispro (ADMELOG) corrective regimen sliding scale   SubCutaneous three times a day before meals  insulin lispro (ADMELOG) corrective regimen sliding scale   SubCutaneous at bedtime  insulin lispro Injectable (ADMELOG) 10 Unit(s) SubCutaneous three times a day before meals  magnesium oxide 400 milliGRAM(s) Oral daily  meropenem  IVPB 500 milliGRAM(s) IV Intermittent every 12 hours  NIFEdipine XL 30 milliGRAM(s) Oral daily  nystatin    Suspension 142891 Unit(s) Oral four times a day  predniSONE   Tablet 5 milliGRAM(s) Oral two times a day  sodium bicarbonate 1300 milliGRAM(s) Oral three times a day  tamsulosin 0.4 milliGRAM(s) Oral at bedtime  valGANciclovir 450 milliGRAM(s) Oral <User Schedule>  vancomycin    Solution 125 milliGRAM(s) Oral every 6 hours      PMHX/PSHX:  GIB (gastrointestinal bleeding)    GERD with esophagitis    Hepatic encephalopathy    Obesity    Fatty liver disease, nonalcoholic    Renal stones    Hypertension    Neuropathy    Hypercholesteremia    Diabetes    S/P cholecystectomy    No significant past surgical history        Family history:  Family history of type 2 diabetes mellitus    Family history of hypertension    Family history of stomach cancer    No pertinent family history in first degree relatives        ROS: As per HPI, 14-point ROS negative otherwise.    General:  No wt loss, fevers, chills, night sweats, fatigue,   Eyes:  Good vision, no reported pain  ENT:  No sore throat, pain, runny nose, dysphagia  CV:  No pain, palpitations, hypo/hypertension  Resp:  No dyspnea, cough, tachypnea, wheezing  GI:  See HPI  :  No pain, bleeding, incontinence, nocturia  Muscle:  No pain, weakness  Neuro:  No weakness, tingling, memory problems  Psych:  No fatigue, insomnia, mood problems, depression  Endocrine:  No polyuria, polydipsia, cold/heat intolerance  Heme:  No petechiae, ecchymosis, easy bruisability  Skin:  No rash, edema      PHYSICAL EXAM:     Vital Signs:  Vital Signs Last 24 Hrs  T(C): 36.8 (28 Dec 2020 05:00), Max: 37.1 (27 Dec 2020 13:00)  T(F): 98.2 (28 Dec 2020 05:00), Max: 98.7 (27 Dec 2020 13:00)  HR: 73 (28 Dec 2020 05:00) (73 - 90)  BP: 147/73 (28 Dec 2020 05:00) (144/73 - 164/74)  BP(mean): --  RR: 18 (28 Dec 2020 05:) (18 - 18)  SpO2: 99% (28 Dec 2020 05:00) (97% - 100%)  Daily     Daily Weight in k.3 (28 Dec 2020 05:00)    GENERAL:  appears comfortable, no acute distress  HEENT:  NC/AT,  conjunctivae clear, sclera -anicteric  CHEST:  no increased effort  HEART:  Regular rate and rhythm  ABDOMEN:  Soft, non-tender, non-distended,  no masses ,no hepato-splenomegaly,   EXTREMITIES:  no cyanosis, clubbing or edema  SKIN:  No rash/erythema/ecchymoses/petechiae/wounds  NEURO:  Alert, oriented    LABS:                        8.7    4.36  )-----------( 173      ( 28 Dec 2020 06:06 )             26.8         141  |  102  |  73<H>  ----------------------------<  202<H>  5.2   |  25  |  4.05<H>    Ca    8.5      28 Dec 2020 06:06  Phos  5.8       Mg     2.1         TPro  5.9<L>  /  Alb  3.1<L>  /  TBili  0.2  /  DBili  x   /  AST  19  /  ALT  27  /  AlkPhos  137<H>  12-28    LIVER FUNCTIONS - ( 28 Dec 2020 06:06 )  Alb: 3.1 g/dL / Pro: 5.9 g/dL / ALK PHOS: 137 U/L / ALT: 27 U/L / AST: 19 U/L / GGT: x           PT/INR - ( 28 Dec 2020 06:06 )   PT: 12.5 sec;   INR: 1.04 ratio         PTT - ( 28 Dec 2020 06:06 )  PTT:29.2 sec        Imaging:             Chief Complaint:  Patient is a 65y old  Male who presents with a chief complaint of transaminitis (27 Dec 2020 11:57)      Interval Events: Patient very frustrated with being in hospital. Denies abdominal pain, diarrhea, n/v. No chest pain, shortness of breath.     Allergies:  codeine (Anaphylaxis)      Hospital Medications:  aspirin  chewable 81 milliGRAM(s) Oral daily  chlorhexidine 2% Cloths 1 Application(s) Topical <User Schedule>  epoetin barry-epbx (RETACRIT) Injectable 92290 Unit(s) IV Push every 7 days  everolimus (ZORTRESS) 2 milliGRAM(s) Oral <User Schedule>  everolimus (ZORTRESS) 2 milliGRAM(s) Oral <User Schedule>  famotidine    Tablet 40 milliGRAM(s) Oral daily  heparin   Injectable 5000 Unit(s) SubCutaneous every 12 hours  insulin glargine Injectable (LANTUS) 18 Unit(s) SubCutaneous at bedtime  insulin lispro (ADMELOG) corrective regimen sliding scale   SubCutaneous three times a day before meals  insulin lispro (ADMELOG) corrective regimen sliding scale   SubCutaneous at bedtime  insulin lispro Injectable (ADMELOG) 10 Unit(s) SubCutaneous three times a day before meals  magnesium oxide 400 milliGRAM(s) Oral daily  meropenem  IVPB 500 milliGRAM(s) IV Intermittent every 12 hours  NIFEdipine XL 30 milliGRAM(s) Oral daily  nystatin    Suspension 834232 Unit(s) Oral four times a day  predniSONE   Tablet 5 milliGRAM(s) Oral two times a day  sodium bicarbonate 1300 milliGRAM(s) Oral three times a day  tamsulosin 0.4 milliGRAM(s) Oral at bedtime  valGANciclovir 450 milliGRAM(s) Oral <User Schedule>  vancomycin    Solution 125 milliGRAM(s) Oral every 6 hours      PMHX/PSHX:  GIB (gastrointestinal bleeding)    GERD with esophagitis    Hepatic encephalopathy    Obesity    Fatty liver disease, nonalcoholic    Renal stones    Hypertension    Neuropathy    Hypercholesteremia    Diabetes    S/P cholecystectomy    No significant past surgical history        Family history:  Family history of type 2 diabetes mellitus    Family history of hypertension    Family history of stomach cancer    No pertinent family history in first degree relatives        ROS: As per HPI, 14-point ROS negative otherwise.    General:  No wt loss, fevers, chills, night sweats, fatigue,   Eyes:  Good vision, no reported pain  ENT:  No sore throat, pain, runny nose, dysphagia  CV:  No pain, palpitations, hypo/hypertension  Resp:  No dyspnea, cough, tachypnea, wheezing  GI:  See HPI  :  No pain, bleeding, incontinence, nocturia  Muscle:  No pain, weakness  Neuro:  No weakness, tingling, memory problems  Psych:  No fatigue, insomnia, mood problems, depression  Endocrine:  No polyuria, polydipsia, cold/heat intolerance  Heme:  No petechiae, ecchymosis, easy bruisability  Skin:  No rash, edema      PHYSICAL EXAM:     Vital Signs:  Vital Signs Last 24 Hrs  T(C): 36.8 (28 Dec 2020 05:00), Max: 37.1 (27 Dec 2020 13:00)  T(F): 98.2 (28 Dec 2020 05:00), Max: 98.7 (27 Dec 2020 13:00)  HR: 73 (28 Dec 2020 05:00) (73 - 90)  BP: 147/73 (28 Dec 2020 05:00) (144/73 - 164/74)  BP(mean): --  RR: 18 (28 Dec 2020 05:00) (18 - 18)  SpO2: 99% (28 Dec 2020 05:00) (97% - 100%)  Daily     Daily Weight in k.3 (28 Dec 2020 05:00)    GENERAL:  appears comfortable, no acute distress  HEENT:  NC/AT,  conjunctivae clear, sclera -anicteric  CHEST:  no increased effort  HEART:  Regular rate and rhythm  ABDOMEN:  Soft, non-tender, non-distended,  no masses ,no hepato-splenomegaly,   EXTREMITIES:  RLE swelling  SKIN:  No rash/erythema/ecchymoses/petechiae/wounds  NEURO:  Alert, oriented    LABS:                        8.7    4.36  )-----------( 173      ( 28 Dec 2020 06:06 )             26.8     12-    141  |  102  |  73<H>  ----------------------------<  202<H>  5.2   |  25  |  4.05<H>    Ca    8.5      28 Dec 2020 06:06  Phos  5.8     12-  Mg     2.1     -    TPro  5.9<L>  /  Alb  3.1<L>  /  TBili  0.2  /  DBili  x   /  AST  19  /  ALT  27  /  AlkPhos  137<H>  12-28    LIVER FUNCTIONS - ( 28 Dec 2020 06:06 )  Alb: 3.1 g/dL / Pro: 5.9 g/dL / ALK PHOS: 137 U/L / ALT: 27 U/L / AST: 19 U/L / GGT: x           PT/INR - ( 28 Dec 2020 06:06 )   PT: 12.5 sec;   INR: 1.04 ratio         PTT - ( 28 Dec 2020 06:06 )  PTT:29.2 sec        Imaging:

## 2020-12-28 NOTE — PROGRESS NOTE ADULT - SUBJECTIVE AND OBJECTIVE BOX
Transplant Surgery - Multidisciplinary Rounds  --------------------------------------------------------------  s/p OLT 7/2/2020      Admitted 12/14/2020 with transaminitis, MISA, diarrhea    Present: Patient seen and examined with multidisciplinary team including Transplant Surgeon: Dr. Garcia, Transplant Nephrologist: Dr. Salvador, Pharmacist: Jessica Nagel. ACPs: Otilia, and unit RN during am rounds.  Disciplines not in attendance will be notified of the plan.     HPI: 65M with PMHx of IDDM, HLD, obesity, HFpEF with mild LV diastolic dysfunction, MGUS, chronic anemia with a history of duodenal ulcer 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  Underwent OLT on 7/2/2020, post op course c/b VRE bacteremia tx with linezolid and delirium likely in setting of CNI toxicity (FK discontinued/Everolimus initiated 7/27).    Recent admission from 11/20-12/2 for OM of the R heel, s/p debridement with Podiatry, and was discharged with Cefepime x 6 weeks via PICC line (placed 12/1). Initial wound cx on admission grew Pseudomonas, OR cx grew staph epi. Was also found with neutropenia and Valcyte was reduced (was on 900mg daily CMV +/-).      Sent to ED from clinic with rising transaminitis and MISA (SCr 3.3 from 1.5). Hospital course c/b C diff colitis and worsening renal function requiring HD x 2 (12/19 and 12/22)    Interval Events:  - afebrile, stable VSS. Last episode of fever on 12/22 with neg bld/urine cxs  - Renal function improving: SCr 4.05 (from 4.14), u/o 2L, has not required HD since 12/22. Shiley removed yesterday  - R LE edema, no calf tenderness, no SOB   - LFTs stable     Potential Discharge date: pending clinical improvement     Education:  Medications    Plan of care:  See Below    MEDICATIONS  (STANDING):  aspirin  chewable 81 milliGRAM(s) Oral daily  chlorhexidine 2% Cloths 1 Application(s) Topical <User Schedule>  epoetin barry-epbx (RETACRIT) Injectable 26290 Unit(s) IV Push every 7 days  everolimus (ZORTRESS) 2 milliGRAM(s) Oral <User Schedule>  everolimus (ZORTRESS) 2 milliGRAM(s) Oral <User Schedule>  famotidine    Tablet 40 milliGRAM(s) Oral daily  heparin   Injectable 5000 Unit(s) SubCutaneous every 12 hours  insulin glargine Injectable (LANTUS) 18 Unit(s) SubCutaneous at bedtime  insulin lispro (ADMELOG) corrective regimen sliding scale   SubCutaneous at bedtime  insulin lispro (ADMELOG) corrective regimen sliding scale   SubCutaneous three times a day before meals  insulin lispro Injectable (ADMELOG) 10 Unit(s) SubCutaneous three times a day before meals  magnesium oxide 400 milliGRAM(s) Oral daily  meropenem  IVPB 500 milliGRAM(s) IV Intermittent every 12 hours  NIFEdipine XL 30 milliGRAM(s) Oral daily  nystatin    Suspension 682221 Unit(s) Oral four times a day  predniSONE   Tablet 5 milliGRAM(s) Oral two times a day  tamsulosin 0.4 milliGRAM(s) Oral at bedtime  valGANciclovir 450 milliGRAM(s) Oral <User Schedule>  vancomycin    Solution 125 milliGRAM(s) Oral every 6 hours    PAST MEDICAL & SURGICAL HISTORY:  GIB (gastrointestinal bleeding)  GERD with esophagitis  Gastritis &amp; Non Bleeding Ulcers  Hepatic encephalopathy  Obesity  Fatty liver disease, nonalcoholic  Renal stones 25 years ago  Hypertension  Neuropathy  Hypercholesteremia  Diabetes  S/P cholecystectomy    Vital Signs Last 24 Hrs  T(C): 36.8 (28 Dec 2020 05:00), Max: 37.1 (27 Dec 2020 13:00)  T(F): 98.2 (28 Dec 2020 05:00), Max: 98.7 (27 Dec 2020 13:00)  HR: 73 (28 Dec 2020 05:00) (73 - 90)  BP: 147/73 (28 Dec 2020 05:00) (144/73 - 164/74)  BP(mean): --  RR: 18 (28 Dec 2020 05:00) (18 - 18)  SpO2: 99% (28 Dec 2020 05:00) (97% - 100%)    I&O's Summary    27 Dec 2020 07:01  -  28 Dec 2020 07:00  --------------------------------------------------------  IN: 460 mL / OUT: 2100 mL / NET: -1640 mL                           8.7    4.36  )-----------( 173      ( 28 Dec 2020 06:06 )             26.8     12-28    141  |  102  |  73<H>  ----------------------------<  202<H>  5.2   |  25  |  4.05<H>    Ca    8.5      28 Dec 2020 06:06  Phos  5.8     12-28  Mg     2.1     12-28    TPro  5.9<L>  /  Alb  3.1<L>  /  TBili  0.2  /  DBili  x   /  AST  19  /  ALT  27  /  AlkPhos  137<H>  12-28      Babesia microti PCR, Blood. (collected 12-25-20 @ 09:01)  Source: .Blood  Final Report (12-25-20 @ 12:15):    Babesia microti PCR    Results: NOT detected    ***************Result Note*************    The detection of Babesia microti by PCR has only been    validated for whole blood; this test has not been approved    by the [place] Food and Drug Administration (FDA). Performance    characteristics of this assay have been determined by    [employer]. The clinical significance    of results should be considered in conjunction with the    overall clinical presentation of the patient. Result is not    intended to be used as the sole means for clinical diagnosis    or patient management decisions.    One negative sample does not necessarily rule    out the presence of a parasitic infection.      Review of systems  Gen: afebrile  Skin:  R heel wound   Head/Eyes/Ears/Mouth: No headache; Normal hearing; Normal vision w/o blurriness; No sinus pain/discomfort, sore throat  Respiratory: No dyspnea, cough, wheezing, hemoptysis  CV: No chest pain, PND, orthopnea  GI:  denies abdominal pain, diarrhea, constipation, nausea, vomiting, melena, hematochezia  : No increased frequency, dysuria, hematuria, nocturia  MSK: No joint pain/swelling; no back pain; no edema  Neuro: No dizziness/lightheadedness, weakness, seizures, numbness, tingling  Heme: No easy bruising or bleeding  Endo: No heat/cold intolerance  Psych: No significant nervousness, anxiety, stress, depression  All other systems were reviewed and are negative, except as noted.        PHYSICAL EXAM:  Constitutional: Well developed / well nourished  Eyes: Anicteric, PERRLA  ENMT: nc/at  Respiratory: CTA B/L  Cardiovascular: RRR, + heart murmur  Gastrointestinal: Soft, mild distention, non tender, incision well healed  Genitourinary: Voiding spontaneously  Extremities: SCD's in place and working bilaterally. R LE edema  Vascular: Palpable dp pulses bilaterally  Neurological: A&O x3  Skin: R heel ulcer: no drainage, no cellulitis  Musculoskeletal: Moving all extremities  Psychiatric: Responsive

## 2020-12-28 NOTE — BEHAVIORAL HEALTH ASSESSMENT NOTE - AXIS III
small esophageal varices (12/2019), portal hypertensive gastropathy, ascites, hx SBP, hx hepatic encephalopathy, GAVE syndrome s/p APC, hx duodenal ulcer (5/2019), HTN, HLD, DM, HFpEF (EF 70%), recent ESBL E coli prostate abscess (3/2020 s/p 4 weeks ertapenem), orthostatic hypotension ( on midodrine) small esophageal varices (12/2019), portal hypertensive gastropathy, ascites, hx SBP, hx hepatic encephalopathy, GAVE syndrome s/p APC, hx duodenal ulcer (5/2019), HTN, HLD, DM, HFpEF (EF 70%), recent ESBL E coli prostate abscess (3/2020 s/p 4 weeks ertapenem), orthostatic hypotension ( on midodrine), OM

## 2020-12-28 NOTE — BEHAVIORAL HEALTH ASSESSMENT NOTE - CASE SUMMARY
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  Underwent OLT on 7/2/2020, post op course c/b VRE bacteremia tx with linezolid and delirium likely in setting of CNI toxicity (FK discontinued/Everolimus initiated 7/27). Previous admission in 11/20-12/2 for OM of the R heel, s/p debridement with Podiatry, had PICC line (placed 12/1). Pt currently is being treated for OM and MISA. Consult for depression.  Patient seen and evaluated, awake and alert oriented x3     Pt was calm cooperative, He relates depression due to  prolonged hospital stay and chronic marital issues. Despite this pt is goal oriented , denies current SI/HI.  Pt's wife denies pt is an acute risk denies red flag signs for suicide. She denies pt expressing suicidal ideations or plan to her. Currently denies A/V Hallucinations though he did have some AH one week ago which have resolved.  Pt may benefit from Zoloft once he is no longer on Linezolid and agrees to starting an antidepressant. 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  Underwent OLT on 7/2/2020, post op course c/b VRE bacteremia tx with linezolid and delirium likely in setting of CNI toxicity (FK discontinued/Everolimus initiated 7/27). Previous admission in 11/20-12/2 for OM of the R heel, s/p debridement with Podiatry, had PICC line (placed 12/1). Pt currently is being treated for OM and MISA. Consult for depression.  Patient seen and evaluated, awake and alert oriented x3     Pt was calm cooperative, He relates depression due to  prolonged hospital stay and chronic marital issues. Despite this pt is goal oriented , denies current SI/HI.  Pt's wife denies pt is an acute risk denies red flag signs for suicide. She denies pt expressing suicidal ideations or plan to her. Currently denies A/V Hallucinations though he did have some AH one week ago which have resolved.  Pt may benefit from Zoloft but should not take if he is on Linezolid again and if he agrees to starting an antidepressant.

## 2020-12-28 NOTE — BEHAVIORAL HEALTH ASSESSMENT NOTE - SUICIDE PROTECTIVE FACTORS
Responsibility to family and others/Identifies reasons for living/Has future plans/Supportive social network of family or friends/Cultural, spiritual and/or moral attitudes against suicide

## 2020-12-28 NOTE — BEHAVIORAL HEALTH ASSESSMENT NOTE - HPI (INCLUDE ILLNESS QUALITY, SEVERITY, DURATION, TIMING, CONTEXT, MODIFYING FACTORS, ASSOCIATED SIGNS AND SYMPTOMS)
[employer]    Pt is a 66 y/o male, retired from construction management, with 4 kids (18, 24, 26, 34), lives with his spouse and 3 adult kids, with no PPHx, no h/o inpt psychiatric hospitalizations, no SA/SIB, PMHx of IDDM, HLD, obesity, HFpEF with mild LV diastolic dysfunction, MGUS, chronic anemia with a history of duodenal ulcer 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  Underwent OLT on 7/2/2020, post op course c/b VRE bacteremia tx with linezolid and delirium likely in setting of CNI toxicity (FK discontinued/Everolimus initiated 7/27). Previous admission in 11/20-12/2 for OM of the R heel, s/p debridement with Podiatry, had PICC line (placed 12/1). Pt currently is being treated for OM and MISA. Consult for depression.    case endorsed by Debra ALDRIDGE- Pt appears depressed seemed to want to talk to someone. She denies pt expressing suicidal ideations. Pt relates wanting to go home so plans to continue antibiotics in the hospital until Wednesday.     Patient seen and evaluated with Dr. Manzano   pt was seen, pt is awake and alert, pt is oriented to person, place, short term 3/3, long term 3/3 , concentration 3/5.  Pt reports feeling depressed due to prolonged hospital stay, "I was only supposed to be here for 3 days". He reports living with his wife and 3 adult kids in [place]. She relates that he is missing all the holidays with the children and grandchildren while being here. He relates concerns that his wife wants to divorce him and that she maybe seeing other people. He relates she holds things he said to her while he was sick/delirious against him. He relates some worthlessness due to this. He does not express hopelessness and has future goals. he relates wanting to go home and spend time with his kids and try to improve his relationship with his wife. He relates being receptive to couples therapy "What ever it takes". While pt was discussing stresses he had related "What's the point if I'm here I should shoot myself". He then later states this was a figure of speak due to frustration and that he has no intention of doing this. He relates firearms were removed from his house. He adamantly denies current SI/HI, He comments that "that's against my Rastafarian "currently denies A/V Hallucinations. He related if his wife  him he would. He however relates last week that he got a phone call from god. He denies CAH. He relates it recommended talking to his children and continue going on. He relates he is sick and feels his time is limited so should make the most of it. He reports good sleep and appetite at home further relating, the noise in the hospital affects sleep and food taste bad here.   Safety plan reviewed with pt who relates being familiar with [facility].     Though pt initially was hesitant due to pt's above states regarding firearm collateral was need to assess for safety  collateral from pt's wife Ada ([phone]), She relates suggesting to get a consult for depression. She relates "My  does not usually cry". She noted him having some crying spells from time to time so was concerned for him. She corroborates with above stating the prolonged hospital stay has made him more depressed. She reports the only thing strange he did was stating he talked about god a week ago. She felt him speaking with a  would be helpful "god told him not to worry" He would talking. Nothing helps him feel better. She denies any red flag signs (e.g Giving away items). She denies pt expressing suicidal ideations or plan with her. She denies thinking he is an acute risk to self. She wanted divorce in 2019. He would be irritable in the hospital. When pt was still being treated he had a reaction with the In the past he had threatened. x2 months. chronic marital issues.    No firearms are the house. When pt is well his is not a violent person.    Safety plan reviewed with her as well    who reports patient has no PPHx, reports patient had a work injury in Oct 1997 which lead to memory impairments, resolved and returned back to baseline after a year.  Reports at baseline that patient is sharp, no issues with long term/short term memories, no h/o dementia.  She reports patient has h/o confusion particularly when his ammonia levels are elevated however confusion resolves after it is treated. She reports that patient has no h/o substance abuse issues, no h/o SA or aggression.  She reports patient has no psychiatric family hx, denies patient has access to guns, denies patient has any legal issues. 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  Underwent OLT on 7/2/2020, post op course c/b VRE bacteremia tx with linezolid and delirium likely in setting of CNI toxicity (FK discontinued/Everolimus initiated 7/27). Previous admission in 11/20-12/2 for OM of the R heel, s/p debridement with Podiatry, had PICC line (placed 12/1). Pt currently is being treated for OM and MISA. Consult for depression.    case endorsed by Debra ALDRIDGE- Pt appears depressed seemed to want to talk to someone. She denies pt expressing suicidal ideations. Pt relates wanting to go home so plans to continue antibiotics in the hospital until Wednesday.     Patient seen and evaluated with Dr. Manzano   pt was seen, pt is awake and alert, pt is oriented to person, place, short term 3/3, long term 3/3 , concentration 3/5.  Pt reports feeling depressed due to prolonged hospital stay, "I was only supposed to be here for 3 days". He reports living with his wife and 3 adult kids in [place]. She relates that he is missing all the holidays with the children and grandchildren while being here. He relates concerns that his wife wants to divorce him and that she maybe seeing other people. He relates she holds things he said to her while he was sick/delirious against him. He relates some worthlessness due to this. He does not express hopelessness and has future goals. he relates wanting to go home and spend time with his kids and try to improve his relationship with his wife. He relates being receptive to couples therapy "What ever it takes". While pt was discussing stressors he had related "What's the point if I'm here, I should shoot myself". He then later states this was a figure of speak due to frustration and that he has no intention of doing this. He relates firearms were removed from his house and that he does not have access. He adamantly denies current SI/HI, He comments that "that's against my Voodoo "currently denies A/V Hallucinations. He related if his wife  him he would. He however relates last week that he got a phone call from god. He denies CAH. He relates it recommended talking to his children and continue going on. He relates he is sick and feels his time is limited so should make the most of it. He reports good sleep and appetite at home further relating, the noise in the hospital affects sleep and food taste bad here.   Safety plan reviewed with pt who relates being familiar with [facility].     Though pt initially was hesitant due to pt's above states regarding firearm collateral was need to assess for safety  collateral from pt's wife Ada ([phone]), She relates suggesting to get a consult for depression. She relates "My  does not usually cry". She noted him having some crying spells from time to time so was concerned for him. She corroborates with above stating the prolonged hospital stay has made him more depressed. She reports the only thing strange was he stated he talked about god a week ago on the phone "god told him not to worry". She felt that him speaking with a  would be helpful. She denies any red flag signs (e.g Giving away items). She denies pt expressing suicidal ideations or plan with her. She denies thinking he is an acute risk to self.  She relates they did have chronic marital issues and she wanted a divorce in 2019. She relates there are No firearms are the house.  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  Underwent OLT on 7/2/2020, post op course c/b VRE bacteremia tx with linezolid and delirium likely in setting of CNI toxicity (FK discontinued/Everolimus initiated 7/27). Previous admission in 11/20-12/2 for OM of the R heel, s/p debridement with Podiatry, had PICC line (placed 12/1). Pt currently is being treated for OM and MISA. Consult for depression.    Case endorsed by Debra ALDRIDGE- Pt appears depressed seemed to want to talk to someone. She denies pt expressing suicidal ideations. Pt relates wanting to go home so plans to continue antibiotics in the hospital until Wednesday.     Patient seen and evaluated with Dr. Manzano   pt was seen, pt is awake and alert, pt is oriented to person, place, short term 3/3, long term 3/3 , concentration 3/5.  Pt reports feeling depressed due to prolonged hospital stay, "I was only supposed to be here for 3 days". He reports living with his wife and 3 adult kids in [place]. She relates that he is missing all the holidays with the children and grandchildren while being here. He relates concerns that his wife wants to divorce him and that she maybe seeing other people. He relates she holds things he said to her while he was sick/delirious against him. He relates some worthlessness due to this. He does not express hopelessness and has future goals. he relates wanting to go home and spend time with his kids and try to improve his relationship with his wife. He relates being receptive to couples therapy "What ever it takes". While pt was discussing stressors he had related "What's the point if I'm here, I should shoot myself". He then later states this was a figure of speak due to frustration and that he has no intention of doing this. He relates firearms were removed from his house and that he does not have access. He adamantly denies current SI/HI, He comments that "that's against my Latter day "currently denies A/V Hallucinations. He related if his wife  him he would. He however relates last week that he got a phone call from god. He denies CAH. He relates it recommended talking to his children and continue going on. He relates he is sick and feels his time is limited so should make the most of it. He reports good sleep and appetite at home further relating, the noise in the hospital affects sleep and food taste bad here.   Safety plan reviewed with pt who relates being familiar with [facility].     Though pt initially was hesitant due to pt's above states regarding firearm collateral was need to assess for safety  collateral from pt's wife Ada ([phone]), She relates suggesting to get a consult for depression. She relates "My  does not usually cry". She noted him having some crying spells from time to time so was concerned for him. She corroborates with above stating the prolonged hospital stay has made him more depressed. She reports the only thing strange was he stated he talked about god a week ago on the phone "god told him not to worry". She felt that him speaking with a  would be helpful. She denies any red flag signs (e.g Giving away items). She denies pt expressing suicidal ideations or plan with her. She denies thinking he is an acute risk to self.  She relates they did have chronic marital issues and she wanted a divorce in 2019. She relates there are No firearms are the house. His other home where he said he kept guns was sold to his son and she denies that he has access to any guns there either.   Safety plan reviewed with her as well

## 2020-12-28 NOTE — PROGRESS NOTE ADULT - ASSESSMENT
65M with PMHx of IDDM, HLD, obesity, HFpEF with mild LV diastolic dysfunction, MGUS, chronic anemia with a history of duodenal ulcer as well as GAVE and duodenal AVM s/p APC (last on 10/11/19), decompensated JEAN/Cirrhosis 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, post op course c/b VRE bacteremia tx with linezolid and delirium likely in setting of CNI toxicity (FK discontinued/Everolimus initiated 7/27). Recent admission from 11/20-12/2 for OM of the R heel, s/p debridement with Podiatry, and was discharged with Cefepime x 6 weeks via PICC line (placed 12/1). Initial wound cx on admission grew Pseudomonas, OR cx grew staph epi. Was also found with neutropenia and Valcyte was reduced (was on 900mg daily CMV +/-) Sent to ED from clinic with rising transaminitis and MISA (SCr 3.3 from 1.5). Hospital course c/b C diff colitis and worsening renal function requiring HD.      Impression:   # C. diff: on po vanco since 12/15. Continues to improved from symptom standpoint. No diarrhea and no contact precautiosn needed  # MISA with c/f uremia:, Cr continues to improve. Diuresing well with 2-3L u/o. Required HD x 2 12/19 and 12/22,  Kidney slightly enlarged on u/s with labs and clinical picture consistent with AIN although cannot exclude acute tubular necrosis with interstitial features. FeNa and FeUrea suggest possible AIN (from cefepime) Cr has been gradually improving and has good UOP . Shiley to be removed today  ( placed over a week ago) to avoid any new infection source.   # Fever: resolved. ( Last fever 12/22) CT scan unrevealing for source. Possible secondary to AIN. Underlying infection (bladder source?) is on differential. Has cdiff which is resolving. Possible osteo though low suspicion from ID and podiatry teams as patient was on targeted antibiotics with clinical appearance of a healing wound.  # Right foot osteomyelitis:  reviewed by podiatry, appears stable. s/p surgical debridement and graft placement on 11/24 by Podiatry.   Wound culture grew pseudomonas and bone bx on 11/24 growing S. epidermidis - likely contaminant. Had PICC line, now removed. On meropenem  # Otitis externa: on floxin drops  # S/p liver transplant 7/2/20:     Recipient Info:   ABO: A  CMV:  Neg  EBV Positive  Donor:  Donor ID:  [id number] Match: [id number]  Age: 29 ABO:  A1 High Risk:   No COD: Cardiovascular  Anti CMV positive, EBV IgG- positive  HepBcAb-neg, Hepatitis C-DANA- neg, Hepatitis C ab-neg    Recommendations:  - continue with po vanco, meropenum,( to be continued until 01/05/2021)  - immunosuppressives with everolimus and prednisone per transplant surgery  - check lower ext dopplers  - strict glycemic control  - hold bactrim given MISA  - monitor CBC, CMP, INR  - monitor I/Os  - transplant hepatology to follow

## 2020-12-28 NOTE — BEHAVIORAL HEALTH ASSESSMENT NOTE - SUMMARY
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 Hospital course c/b MISA, VRE UTI, acute blood loss anemia, received blood transfusion, EGD done 6/22 with Grade II esophageal varices, acute gastritis with hemorrhage and acute duodenitis with hemorrhage. Patient had worsening mental status, concerning decompensating liver cirrhosis, hepatic encephalopathy patient then transferred to SICU for further care. He is now s/p OLT complicated by delirium, bacteremia, hyper/hypoglycemia.  Psychiatry consulted to assess for delirium, agitation.    Patient seen and evaluated, awake and alert oriented x 2.  Able to report some recent events in the hospital such as having a liver transplant.  Denies SI/HI, denies feeling depressed.  Reports feeling frustrated that staff in the hospital have been telling him he is confused however patient disagrees with this.  Reports at times feeling as if people are standing behind him.  Currently denies AVH.  Per wife, patient for the past week has been waxing and waning, reports yesterday patient was having visual hallucinations, seeing tacos on the floor.  She reports patient is also more irritable.  Denies patient has any cognitive deficits or dementia at baseline.  At this time, patient's presentation consistent with delirium 2/2 GMC.  Would recommend decreasing Remeron to 15mg po qhs as high doses may be more activating and starting Seroquel 25mg po q1800 as well as melatonin 3mg po qhs. 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  Underwent OLT on 7/2/2020, post op course c/b VRE bacteremia tx with linezolid and delirium likely in setting of CNI toxicity (FK discontinued/Everolimus initiated 7/27). Previous admission in 11/20-12/2 for OM of the R heel, s/p debridement with Podiatry, had PICC line (placed 12/1). Pt currently is being treated for OM and MISA. Consult for depression.    Patient seen and evaluated, awake and alert oriented x3     Pt was calm cooperative, He relates depression due to  prolonged hospital stay and chronic marital issues. Despite this pt is goal oriented , denies current SI/HI,  collateral denies thinking pt is an acute risk denies red flag signs for suicide. She denies pt expressing suicidal ideations or plan to her. Currently denies A/V Hallucinations. 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  Underwent OLT on 7/2/2020, post op course c/b VRE bacteremia tx with linezolid and delirium likely in setting of CNI toxicity (FK discontinued/Everolimus initiated 7/27). Previous admission in 11/20-12/2 for OM of the R heel, s/p debridement with Podiatry, had PICC line (placed 12/1). Pt currently is being treated for OM and MISA. Consult for depression.    Patient seen and evaluated, awake and alert oriented x3     Pt was calm cooperative, He relates depression due to  prolonged hospital stay and chronic marital issues. Despite this pt is goal oriented , denies current SI/HI,  collateral denies thinking pt is an acute risk denies red flag signs for suicide. She denies pt expressing suicidal ideations or plan to her. Currently denies A/V Hallucinations.

## 2020-12-28 NOTE — BEHAVIORAL HEALTH ASSESSMENT NOTE - NSBHCONSULTMEDS_PSY_A_CORE FT
Pt would benefit from anti-depressant however was not receptive to starting medication  If pt changes his mind may consider Zoloft 25mg daily

## 2020-12-28 NOTE — BEHAVIORAL HEALTH ASSESSMENT NOTE - PRIMARY DX
MDD (major depressive disorder), single episode, mild Current moderate episode of major depressive disorder without prior episode

## 2020-12-28 NOTE — BEHAVIORAL HEALTH ASSESSMENT NOTE - NSBHCONSULTFOLLOWAFTERCARE_PSY_A_CORE FT
OP psych f/u at [facility]- [phone]  Provided [facility] contact for crisis  other safety plan reviewed with pt  may consider MANI rea  May connect family with [facility] support group, (discussed with pt) pt seemed receptive to starting therapy/couples therapy  OP psych f/u at [facility]- [phone]  Provided [facility] contact for crisis  other safety plan reviewed with pt  may consider SW eval  follow up with inhouse    May connect family with [facility] support group, (discussed with pt)

## 2020-12-28 NOTE — PROGRESS NOTE ADULT - SUBJECTIVE AND OBJECTIVE BOX
[facility]  Ramón Morales PA-C  [address] [place] [address]  [phone]      INTERVAL HPI/OVERNIGHT EVENTS:    patient seen and examined  overnight events noted       MEDICATIONS  (STANDING):  aspirin  chewable 81 milliGRAM(s) Oral daily  chlorhexidine 2% Cloths 1 Application(s) Topical daily  epoetin barry-epbx (RETACRIT) Injectable 76777 Unit(s) SubCutaneous every 7 days  everolimus (ZORTRESS) 1.5 milliGRAM(s) Oral <User Schedule>  heparin   Injectable 5000 Unit(s) SubCutaneous every 12 hours  insulin glargine Injectable (LANTUS) 10 Unit(s) SubCutaneous at bedtime  insulin lispro (ADMELOG) corrective regimen sliding scale   SubCutaneous three times a day before meals  insulin lispro (ADMELOG) corrective regimen sliding scale   SubCutaneous at bedtime  insulin lispro Injectable (ADMELOG) 5 Unit(s) SubCutaneous three times a day before meals  insulin lispro Injectable (ADMELOG) 2 Unit(s) SubCutaneous once  magnesium oxide 400 milliGRAM(s) Oral daily  meropenem  IVPB 500 milliGRAM(s) IV Intermittent every 24 hours  nystatin    Suspension 192309 Unit(s) Oral four times a day  ofloxacin 0.3% Solution 10 Drop(s) Right Ear two times a day  pantoprazole    Tablet 40 milliGRAM(s) Oral before breakfast  predniSONE   Tablet 5 milliGRAM(s) Oral two times a day  sodium bicarbonate 1300 milliGRAM(s) Oral three times a day  sodium bicarbonate  Infusion 0.05 mEq/kG/Hr (75 mL/Hr) IV Continuous <Continuous>  tamsulosin 0.4 milliGRAM(s) Oral at bedtime  valGANciclovir 450 milliGRAM(s) Oral <User Schedule>  vancomycin    Solution 125 milliGRAM(s) Oral every 6 hours    MEDICATIONS  (PRN):      Allergies    codeine (Anaphylaxis)    Intolerances        ROS:   General:  No wt loss, No fevers, chills, night sweats, + fatigue,   Eyes:  Good vision, no reported pain  ENT:  No sore throat, pain, runny nose, dysphagia  CV:  No pain, palpitations, hypo/hypertension  Resp:  No dyspnea, cough, tachypnea, wheezing  GI:  No pain, No nausea, No vomiting, No diarrhea, No constipation, No weight loss, No fever, No pruritis, No rectal bleeding, No tarry stools, No dysphagia,  :  No pain, bleeding, incontinence, nocturia  Muscle:  No pain, weakness  Neuro:  No weakness, tingling, memory problems  Psych:  No fatigue, insomnia, mood problems, depression  Endocrine:  No polyuria, polydipsia, cold/heat intolerance  Heme:  No petechiae, ecchymosis, easy bruisability  Skin:  No rash, tattoos, scars, edema      PHYSICAL EXAM:   Vital Signs:  Vital Signs Last 24 Hrs  T(C): 36.5 (19 Dec 2020 15:00), Max: 37.2 (19 Dec 2020 04:00)  T(F): 97.7 (19 Dec 2020 15:00), Max: 99 (19 Dec 2020 04:00)  HR: 75 (19 Dec 2020 15:00) (75 - 91)  BP: 148/68 (19 Dec 2020 15:00) (118/81 - 163/70)  BP(mean): 95 (19 Dec 2020 15:00) (90 - 106)  RR: 20 (19 Dec 2020 15:00) (16 - 20)  SpO2: 96% (19 Dec 2020 15:00) (92% - 98%)  Daily     Daily Weight in k.8 (18 Dec 2020 20:35)    GENERAL:  no distress  HEENT:  NC/AT  ABDOMEN:  Soft, non-tender, non-distended, normoactive bowel sounds  EXTEREMITIES:  +RLE edema   SKIN:  No rash/erythema/ecchymoses/petechiae/wounds/abscess/warm/dry  NEURO:  awake, alert      LABS:                        7.4    4.50  )-----------( 179      ( 19 Dec 2020 05:29 )             22.9     12-19    133<L>  |  101  |  92<H>  ----------------------------<  224<H>  4.7   |  16<L>  |  6.94<H>    Ca    8.2<L>      19 Dec 2020 05:29  Phos  4.6     12-  Mg     2.2     12-    TPro  5.8<L>  /  Alb  2.6<L>  /  TBili  0.1<L>  /  DBili  x   /  AST  31  /  ALT  47<H>  /  AlkPhos  222<H>  12-19    PT/INR - ( 19 Dec 2020 05:29 )   PT: 13.1 sec;   INR: 1.10 ratio         PTT - ( 19 Dec 2020 05:29 )  PTT:26.1 sec  Urinalysis Basic - ( 17 Dec 2020 16:54 )    Color: Light Yellow / Appearance: Slightly Turbid / S.012 / pH: x  Gluc: x / Ketone: Negative  / Bili: Negative / Urobili: Negative   Blood: x / Protein: 100 mg/dL / Nitrite: Negative   Leuk Esterase: Large / RBC: 3 /hpf /  /HPF   Sq Epi: x / Non Sq Epi: 0 /hpf / Bacteria: Negative        RADIOLOGY & ADDITIONAL TESTS:

## 2020-12-28 NOTE — BEHAVIORAL HEALTH ASSESSMENT NOTE - NSBHCHARTREVIEWINVESTIGATE_PSY_A_CORE FT
Ventricular Rate 106 BPM    Atrial Rate 106 BPM    P-R Interval 190 ms    QRS Duration 90 ms    Q-T Interval 342 ms    QTC Calculation(Bazett) 454 ms    P Axis 52 degrees    R Axis 15 degrees    T Axis 56 degrees    Diagnosis Line SINUS TACHYCARDIA  WHEN COMPARED WITH ECG OF 27-JUL-2020 04:49,  NO SIGNIFICANT CHANGE WAS FOUND  Confirmed by JANNETH WALTER PERWAIZ ([id number]) on 12/16/2020 12:11:11 AM Ventricular Rate 106 BPM    Atrial Rate 106 BPM  P-R Interval 190 ms  QRS Duration 90 ms    Q-T Interval 342 ms    QTC Calculation(Bazett) 454 ms    P Axis 52 degrees    R Axis 15 degrees    T Axis 56 degrees    Diagnosis Line SINUS TACHYCARDIA  WHEN COMPARED WITH ECG OF 27-JUL-2020 04:49,  NO SIGNIFICANT CHANGE WAS FOUND  Confirmed by JANNETH WALTER PERWAIZ ([id number]) on 12/16/2020 12:11:11 AM

## 2020-12-28 NOTE — BEHAVIORAL HEALTH ASSESSMENT NOTE - DIFFERENTIAL
Adjustment disorder with depressed mood vs MDD MDD, mild vs adjustment disorder MDD, moderate vs adjustment disorder

## 2020-12-28 NOTE — BEHAVIORAL HEALTH ASSESSMENT NOTE - FAMILY HISTORY OF SUBSTANCE ABUSE
Patient states her job is requesting letter stating she is a high risk person due to her medical hx. Patient denies any exposure to COVID-19 or cough, fever, sob. Patient works at a [employer] (formerly [employer] service. Patient is not requesting for time off, she just needs to proof her medical hx.     Route do     None known

## 2020-12-28 NOTE — PROGRESS NOTE ADULT - ATTENDING COMMENTS
s/p OLT 5 months ago. Admitted with MISA, transaminitis  LFT's back to baseline  MISA stable, non-oliguric. Cr continues to improve, down to 4. No need for HD at this time. Lasix held for now until improvement in Cr. making 2L urine  immunosuppression - Cont everolimus 2mg bid, prednisone 5 bid, Cellcept held due to infection  f/u nephrology  encourage ambulation with assistance  cont abx as per ID for osteo and c.diff colitis- both improving.

## 2020-12-28 NOTE — PROGRESS NOTE ADULT - ATTENDING COMMENTS
Hepatology Staff: Yomi Medina MD    I saw and examined the patient along with  Dr. Singh 12-28-20 @ 13:39.    Patient Medical Record, hosptial course was reviewed and summarized as below:    Vitals: Vital Signs Last 24 Hrs  T(C): 36.8 (28 Dec 2020 05:00), Max: 36.8 (27 Dec 2020 21:00)  T(F): 98.2 (28 Dec 2020 05:00), Max: 98.3 (27 Dec 2020 21:00)  HR: 73 (28 Dec 2020 05:00) (73 - 90)  BP: 147/73 (28 Dec 2020 05:00) (147/73 - 164/74)  RR: 18 (28 Dec 2020 05:00) (18 - 18)  SpO2: 99% (28 Dec 2020 05:00) (97% - 100%)    Labs:Creatinine, Serum: 4.05 mg/dL (12-28-20 @ 06:06)  Bilirubin Total, Serum: 0.2 mg/dL (12-28-20 @ 06:06)  INR: 1.04 ratio (12-28-20 @ 06:06)      I/O: I&O's Summary    27 Dec 2020 07:01  -  28 Dec 2020 07:00  --------------------------------------------------------  IN: 460 mL / OUT: 2100 mL / NET: -1640 mL      Nutritional Status:   Albumin, Serum: 3.1 g/dL (12-28-20 @ 06:06)    Last 24 hour events:  Patient has been very upset and angry, showed his frustration, and want to go home. MISA has been improving, and with good urine output. Likely had ATN ( ?Interstitial nephritis), now in recovery phase.    Recommendations: We had along discussion with the patient about his need for continued hospitalization for completion of his IV antibiotics, and closer monitoring of his renal function- but he continued to remain adamant for going home as soon as possible. Discussed with ID, and Transplant Surgery team during multidisciplinary rounds.   May need a PICC or Midline if we need to continue with home antibiotics ( vs persuade patient to stay to end of this week to continue finish 5 weeks at least of antibiotics)  Cont with Everolimus 2/2. Myfortic on hold, and Prednisone 5 mg daily.  Cont with IV Meropenem.  Healing ulcer in the foot.  Agree with the rest of the management.      Plan discussed with Primary team.

## 2020-12-28 NOTE — BEHAVIORAL HEALTH ASSESSMENT NOTE - RISK ASSESSMENT
Risk factors: acute/chronic cognitive impairments, acute/chronic medical issues, not receiving treatment    Protective factors: no current SIIP/HIIP, no h/o SA/SIB, no h/o psych admissions, no access to weapons, no active substance abuse, with adult children, domiciled, intact marriage, social supports, positive therapeutic relationship, engaged in treatment, compliant with treatment, help-seeking behaviors    Overall, pt is a low risk of harm to self/others and does not require psychiatric admission for safety and stabilization. Low Acute Suicide Risk

## 2020-12-28 NOTE — PROGRESS NOTE ADULT - SUBJECTIVE AND OBJECTIVE BOX
--------------------------------------------------------------------------------  Chief Complaint:  I want to go home  24 hour events/subjective:    Reviewed    PAST HISTORY  --------------------------------------------------------------------------------  No significant changes to PMH, PSH, FHx, SHx, unless otherwise noted    ALLERGIES & MEDICATIONS  --------------------------------------------------------------------------------  Allergies    codeine (Anaphylaxis)    Intolerances      Standing Inpatient Medications  aspirin  chewable 81 milliGRAM(s) Oral daily  chlorhexidine 2% Cloths 1 Application(s) Topical <User Schedule>  epoetin barry-epbx (RETACRIT) Injectable 06917 Unit(s) IV Push every 7 days  everolimus (ZORTRESS) 2 milliGRAM(s) Oral <User Schedule>  everolimus (ZORTRESS) 2 milliGRAM(s) Oral <User Schedule>  famotidine    Tablet 40 milliGRAM(s) Oral daily  heparin   Injectable 5000 Unit(s) SubCutaneous every 12 hours  insulin glargine Injectable (LANTUS) 18 Unit(s) SubCutaneous at bedtime  insulin lispro (ADMELOG) corrective regimen sliding scale   SubCutaneous at bedtime  insulin lispro (ADMELOG) corrective regimen sliding scale   SubCutaneous three times a day before meals  insulin lispro Injectable (ADMELOG) 10 Unit(s) SubCutaneous three times a day before meals  magnesium oxide 400 milliGRAM(s) Oral daily  meropenem  IVPB 500 milliGRAM(s) IV Intermittent every 12 hours  NIFEdipine XL 30 milliGRAM(s) Oral daily  nystatin    Suspension 845721 Unit(s) Oral four times a day  predniSONE   Tablet 5 milliGRAM(s) Oral two times a day  tamsulosin 0.4 milliGRAM(s) Oral at bedtime  valGANciclovir 450 milliGRAM(s) Oral <User Schedule>  vancomycin    Solution 125 milliGRAM(s) Oral every 6 hours    PRN Inpatient Medications      REVIEW OF SYSTEMS  --------------------------------------------------------------------------------  Gen: No fatigue, fevers/chills, weakness  Skin: No rashes  Head/Eyes/Ears/Mouth: No headache;No sore throat  Respiratory: No dyspnea, cough,   CV: No chest pain, PND, orthopnea  GI: No abdominal pain, diarrhea, constipation, nausea, vomiting  : No increased frequency, dysuria, hematuria, nocturia  MSK: No joint pain/swelling; no back pain; no edema  Neuro: No dizziness/lightheadedness, weakness, seizures, numbness, tingling  Psych: No significant nervousness, anxiety, stress, depression    All other systems were reviewed and are negative, except as noted.    VITALS/PHYSICAL EXAM  --------------------------------------------------------------------------------  T(C): 36.6 (12-28-20 @ 17:06), Max: 36.8 (12-27-20 @ 21:00)  HR: 83 (12-28-20 @ 17:06) (73 - 83)  BP: 173/75 (12-28-20 @ 17:06) (147/73 - 173/75)  RR: 18 (12-28-20 @ 17:06) (18 - 18)  SpO2: 97% (12-28-20 @ 17:06) (97% - 100%)        12-27-20 @ 07:01  -  12-28-20 @ 07:00  --------------------------------------------------------  IN: 460 mL / OUT: 2100 mL / NET: -1640 mL      Physical Exam:  	Gen: Appears upset, frustrated. No acute distress/dyspnoea  	HEENT: PERRL, supple neck, clear oropharynx  	Pulm: CTA B/L  	CV: RRR, S1S2; no rub  	Abd: +BS, soft, nontender/nondistended        	: No suprapubic tenderness  	LE:  no edema, Noted foot lesion  	Neuro: No focal deficits, intact gait  	Psych: Normal affect and mood  	Skin: Warm, without rashes      LABS/STUDIES  --------------------------------------------------------------------------------              8.7    4.36  >-----------<  173      [12-28-20 @ 06:06]              26.8     141  |  102  |  73  ----------------------------<  202      [12-28-20 @ 06:06]  5.2   |  25  |  4.05        Ca     8.5     [12-28-20 @ 06:06]      Mg     2.1     [12-28-20 @ 06:06]      Phos  5.8     [12-28-20 @ 06:06]    TPro  5.9  /  Alb  3.1  /  TBili  0.2  /  DBili  x   /  AST  19  /  ALT  27  /  AlkPhos  137  [12-28-20 @ 06:06]    PT/INR: PT 12.5 , INR 1.04       [12-28-20 @ 06:06]  PTT: 29.2       [12-28-20 @ 06:06]      Creatinine Trend:  SCr 4.05 [12-28 @ 06:06]  SCr 4.14 [12-27 @ 12:12]  SCr 4.32 [12-27 @ 06:30]  SCr 4.49 [12-26 @ 06:31]  SCr 4.92 [12-25 @ 06:26]        Urinalysis - [12-28-20 @ 11:31]      Color Light Yellow / Appearance Clear / SG 1.013 / pH 6.5      Gluc 200 mg/dL / Ketone Negative  / Bili Negative / Urobili Negative       Blood Small / Protein 30 mg/dL / Leuk Est Large / Nitrite Negative      RBC 3 /  / Hyaline 4 / Gran  / Sq Epi  / Non Sq Epi 0 / Bacteria Negative    Urine Creatinine <2      [12-28-20 @ 11:31]  Urine Protein 37      [12-28-20 @ 11:31]    Iron 160, TIBC Unable to calculate Test Repeated, %sat Unable to calculate Test Repeated      [06-12-20 @ 09:04]  Ferritin 493      [04-29-20 @ 08:20]  HbA1c 6.4      [02-12-20 @ 17:08]  TSH 3.26      [04-26-20 @ 09:47]  Lipid: chol 158, , HDL 44, LDL 93      [08-05-20 @ 08:57]    HBsAg Nonreact      [12-19-20 @ 14:40]  HCV 0.06, Nonreact      [12-19-20 @ 14:40]    C3 Complement 158      [12-17-20 @ 16:43]  C4 Complement 38      [12-17-20 @ 16:43]    Immunofixation, Serum:    One Weak IgA Lambda  Band and One Weak IgG Lambda Band Identified    Reference Range: None Detected (04.29.20 @ 08:20)    < from: US Kidney and Bladder (12.16.20 @ 18:49) >  FINDINGS:    Right kidney: 15.3 cm. No renal mass, hydronephrosis or calculi.    Left kidney: 16.0 cm. No renal mass or calculi. There is fullness of the collecting system.    Urinary bladder: Within normal limits. Bilateral ureteral jets are demonstrated.    There is left upper quadrant free fluid.    IMPRESSION:    Fullness of the left collecting system.  Left upper quadrant ascites.      < end of copied text >

## 2020-12-29 ENCOUNTER — TRANSCRIPTION ENCOUNTER (OUTPATIENT)
Age: 65
End: 2020-12-29

## 2020-12-29 LAB
A PHAGOCYTOPH DNA BLD QL NAA+PROBE: NEGATIVE — SIGNIFICANT CHANGE UP
ALBUMIN SERPL ELPH-MCNC: 2.7 G/DL — LOW (ref 3.3–5)
ALP SERPL-CCNC: 117 U/L — SIGNIFICANT CHANGE UP (ref 40–120)
ALT FLD-CCNC: 28 U/L — SIGNIFICANT CHANGE UP (ref 10–45)
ANION GAP SERPL CALC-SCNC: 13 MMOL/L — SIGNIFICANT CHANGE UP (ref 5–17)
APTT BLD: 29.3 SEC — SIGNIFICANT CHANGE UP (ref 27.5–35.5)
AST SERPL-CCNC: 24 U/L — SIGNIFICANT CHANGE UP (ref 10–40)
B MICROTI DNA BLD QL NAA+PROBE: NEGATIVE — SIGNIFICANT CHANGE UP
B MIYAMOTOI GLPQ BLD QL NAA+NON-PROBE: NEGATIVE — SIGNIFICANT CHANGE UP
BABESIA DNA SPEC QL NAA+PROBE: NEGATIVE — SIGNIFICANT CHANGE UP
BABESIA DNA SPEC QL NAA+PROBE: NEGATIVE — SIGNIFICANT CHANGE UP
BASOPHILS # BLD AUTO: 0.01 K/UL — SIGNIFICANT CHANGE UP (ref 0–0.2)
BASOPHILS NFR BLD AUTO: 0.3 % — SIGNIFICANT CHANGE UP (ref 0–2)
BILIRUB SERPL-MCNC: 0.1 MG/DL — LOW (ref 0.2–1.2)
BUN SERPL-MCNC: 63 MG/DL — HIGH (ref 7–23)
CALCIUM SERPL-MCNC: 7.5 MG/DL — LOW (ref 8.4–10.5)
CHLORIDE SERPL-SCNC: 107 MMOL/L — SIGNIFICANT CHANGE UP (ref 96–108)
CO2 SERPL-SCNC: 21 MMOL/L — LOW (ref 22–31)
CREAT SERPL-MCNC: 3.27 MG/DL — HIGH (ref 0.5–1.3)
E CHAFFEENSIS DNA BLD QL NAA+PROBE: NEGATIVE — SIGNIFICANT CHANGE UP
E EWINGII DNA SPEC QL NAA+PROBE: NEGATIVE — SIGNIFICANT CHANGE UP
EHRLICHIA DNA SPEC QL NAA+PROBE: NEGATIVE — SIGNIFICANT CHANGE UP
EOSINOPHIL # BLD AUTO: 0.04 K/UL — SIGNIFICANT CHANGE UP (ref 0–0.5)
EOSINOPHIL NFR BLD AUTO: 1.1 % — SIGNIFICANT CHANGE UP (ref 0–6)
EVEROLIMUS, WHOLE BLOOD RESULT: 3.3 NG/ML — SIGNIFICANT CHANGE UP (ref 3–8)
GLUCOSE BLDC GLUCOMTR-MCNC: 112 MG/DL — HIGH (ref 70–99)
GLUCOSE BLDC GLUCOMTR-MCNC: 131 MG/DL — HIGH (ref 70–99)
GLUCOSE BLDC GLUCOMTR-MCNC: 137 MG/DL — HIGH (ref 70–99)
GLUCOSE BLDC GLUCOMTR-MCNC: 175 MG/DL — HIGH (ref 70–99)
GLUCOSE SERPL-MCNC: 155 MG/DL — HIGH (ref 70–99)
HCT VFR BLD CALC: 22.8 % — LOW (ref 39–50)
HGB BLD-MCNC: 7.1 G/DL — LOW (ref 13–17)
IMM GRANULOCYTES NFR BLD AUTO: 1.1 % — SIGNIFICANT CHANGE UP (ref 0–1.5)
INR BLD: 1.09 RATIO — SIGNIFICANT CHANGE UP (ref 0.88–1.16)
LYMPHOCYTES # BLD AUTO: 0.58 K/UL — LOW (ref 1–3.3)
LYMPHOCYTES # BLD AUTO: 15.5 % — SIGNIFICANT CHANGE UP (ref 13–44)
MAGNESIUM SERPL-MCNC: 1.7 MG/DL — SIGNIFICANT CHANGE UP (ref 1.6–2.6)
MCHC RBC-ENTMCNC: 28.5 PG — SIGNIFICANT CHANGE UP (ref 27–34)
MCHC RBC-ENTMCNC: 31.1 GM/DL — LOW (ref 32–36)
MCV RBC AUTO: 91.6 FL — SIGNIFICANT CHANGE UP (ref 80–100)
MONOCYTES # BLD AUTO: 0.31 K/UL — SIGNIFICANT CHANGE UP (ref 0–0.9)
MONOCYTES NFR BLD AUTO: 8.3 % — SIGNIFICANT CHANGE UP (ref 2–14)
NEUTROPHILS # BLD AUTO: 2.76 K/UL — SIGNIFICANT CHANGE UP (ref 1.8–7.4)
NEUTROPHILS NFR BLD AUTO: 73.7 % — SIGNIFICANT CHANGE UP (ref 43–77)
NRBC # BLD: 0 /100 WBCS — SIGNIFICANT CHANGE UP (ref 0–0)
PHOSPHATE SERPL-MCNC: 4.8 MG/DL — HIGH (ref 2.5–4.5)
PLATELET # BLD AUTO: 167 K/UL — SIGNIFICANT CHANGE UP (ref 150–400)
POTASSIUM SERPL-MCNC: 5 MMOL/L — SIGNIFICANT CHANGE UP (ref 3.5–5.3)
POTASSIUM SERPL-SCNC: 5 MMOL/L — SIGNIFICANT CHANGE UP (ref 3.5–5.3)
PROT SERPL-MCNC: 5 G/DL — LOW (ref 6–8.3)
PROTHROM AB SERPL-ACNC: 13 SEC — SIGNIFICANT CHANGE UP (ref 10.6–13.6)
RBC # BLD: 2.49 M/UL — LOW (ref 4.2–5.8)
RBC # FLD: 13.6 % — SIGNIFICANT CHANGE UP (ref 10.3–14.5)
SODIUM SERPL-SCNC: 141 MMOL/L — SIGNIFICANT CHANGE UP (ref 135–145)
WBC # BLD: 3.74 K/UL — LOW (ref 3.8–10.5)
WBC # FLD AUTO: 3.74 K/UL — LOW (ref 3.8–10.5)

## 2020-12-29 PROCEDURE — 99232 SBSQ HOSP IP/OBS MODERATE 35: CPT

## 2020-12-29 PROCEDURE — 99232 SBSQ HOSP IP/OBS MODERATE 35: CPT | Mod: GC

## 2020-12-29 RX ORDER — EVEROLIMUS 10 MG/1
3 TABLET ORAL
Refills: 0 | Status: DISCONTINUED | OUTPATIENT
Start: 2020-12-29 | End: 2020-12-30

## 2020-12-29 RX ORDER — ACETAMINOPHEN 500 MG
650 TABLET ORAL ONCE
Refills: 0 | Status: COMPLETED | OUTPATIENT
Start: 2020-12-29 | End: 2020-12-29

## 2020-12-29 RX ORDER — ERYTHROPOIETIN 10000 [IU]/ML
10000 INJECTION, SOLUTION INTRAVENOUS; SUBCUTANEOUS ONCE
Refills: 0 | Status: COMPLETED | OUTPATIENT
Start: 2020-12-29 | End: 2020-12-29

## 2020-12-29 RX ORDER — VANCOMYCIN HCL 1 G
125 VIAL (EA) INTRAVENOUS EVERY 12 HOURS
Refills: 0 | Status: DISCONTINUED | OUTPATIENT
Start: 2020-12-29 | End: 2020-12-30

## 2020-12-29 RX ADMIN — Medication 125 MILLIGRAM(S): at 17:50

## 2020-12-29 RX ADMIN — Medication 5 MILLIGRAM(S): at 05:35

## 2020-12-29 RX ADMIN — VALGANCICLOVIR 450 MILLIGRAM(S): 450 TABLET, FILM COATED ORAL at 09:43

## 2020-12-29 RX ADMIN — Medication 30 MILLIGRAM(S): at 05:35

## 2020-12-29 RX ADMIN — Medication 500000 UNIT(S): at 22:38

## 2020-12-29 RX ADMIN — ERYTHROPOIETIN 10000 UNIT(S): 10000 INJECTION, SOLUTION INTRAVENOUS; SUBCUTANEOUS at 10:39

## 2020-12-29 RX ADMIN — Medication 500000 UNIT(S): at 00:35

## 2020-12-29 RX ADMIN — HEPARIN SODIUM 5000 UNIT(S): 5000 INJECTION INTRAVENOUS; SUBCUTANEOUS at 17:49

## 2020-12-29 RX ADMIN — Medication 500000 UNIT(S): at 05:35

## 2020-12-29 RX ADMIN — Medication 10 UNIT(S): at 18:05

## 2020-12-29 RX ADMIN — Medication 125 MILLIGRAM(S): at 05:35

## 2020-12-29 RX ADMIN — Medication 81 MILLIGRAM(S): at 12:40

## 2020-12-29 RX ADMIN — Medication 650 MILLIGRAM(S): at 22:21

## 2020-12-29 RX ADMIN — MEROPENEM 100 MILLIGRAM(S): 1 INJECTION INTRAVENOUS at 18:15

## 2020-12-29 RX ADMIN — EVEROLIMUS 3 MILLIGRAM(S): 10 TABLET ORAL at 20:18

## 2020-12-29 RX ADMIN — FAMOTIDINE 40 MILLIGRAM(S): 10 INJECTION INTRAVENOUS at 12:40

## 2020-12-29 RX ADMIN — EVEROLIMUS 2 MILLIGRAM(S): 10 TABLET ORAL at 09:44

## 2020-12-29 RX ADMIN — Medication 125 MILLIGRAM(S): at 00:35

## 2020-12-29 RX ADMIN — Medication 650 MILLIGRAM(S): at 21:51

## 2020-12-29 RX ADMIN — MAGNESIUM OXIDE 400 MG ORAL TABLET 400 MILLIGRAM(S): 241.3 TABLET ORAL at 12:40

## 2020-12-29 RX ADMIN — INSULIN GLARGINE 18 UNIT(S): 100 INJECTION, SOLUTION SUBCUTANEOUS at 22:36

## 2020-12-29 RX ADMIN — Medication 10 UNIT(S): at 09:04

## 2020-12-29 RX ADMIN — TAMSULOSIN HYDROCHLORIDE 0.4 MILLIGRAM(S): 0.4 CAPSULE ORAL at 20:18

## 2020-12-29 RX ADMIN — CHLORHEXIDINE GLUCONATE 1 APPLICATION(S): 213 SOLUTION TOPICAL at 06:40

## 2020-12-29 RX ADMIN — HEPARIN SODIUM 5000 UNIT(S): 5000 INJECTION INTRAVENOUS; SUBCUTANEOUS at 05:35

## 2020-12-29 RX ADMIN — Medication 500000 UNIT(S): at 12:40

## 2020-12-29 RX ADMIN — Medication 500000 UNIT(S): at 17:48

## 2020-12-29 RX ADMIN — MEROPENEM 100 MILLIGRAM(S): 1 INJECTION INTRAVENOUS at 05:36

## 2020-12-29 RX ADMIN — Medication 10 UNIT(S): at 12:41

## 2020-12-29 NOTE — PROGRESS NOTE BEHAVIORAL HEALTH - NSBHCHARTREVIEWVS_PSY_A_CORE FT
Vital Signs Last 24 Hrs  T(C): 36.6 (29 Dec 2020 13:00), Max: 37.2 (29 Dec 2020 01:00)  T(F): 97.8 (29 Dec 2020 13:00), Max: 99 (29 Dec 2020 01:00)  HR: 78 (29 Dec 2020 13:00) (75 - 88)  BP: 133/65 (29 Dec 2020 13:00) (133/65 - 173/75)  BP(mean): --  RR: 18 (29 Dec 2020 13:00) (18 - 18)  SpO2: 98% (29 Dec 2020 13:00) (97% - 100%)

## 2020-12-29 NOTE — DISCHARGE NOTE NURSING/CASE MANAGEMENT/SOCIAL WORK - PATIENT PORTAL LINK FT
You can access the FollowMyHealth Patient Portal offered by [facility] by registering at the following website: [email or web address]. By joining [employer]’s FollowMyHealth portal, you will also be able to view your health information using other applications (apps) compatible with our system.

## 2020-12-29 NOTE — PROGRESS NOTE ADULT - SUBJECTIVE AND OBJECTIVE BOX
Chief Complaint:  Patient is a 65y old  Male who presents with a chief complaint of transaminitis (28 Dec 2020 18:27)      Interval Events:     Allergies:  codeine (Anaphylaxis)      Hospital Medications:  aspirin  chewable 81 milliGRAM(s) Oral daily  chlorhexidine 2% Cloths 1 Application(s) Topical <User Schedule>  epoetin barry-epbx (RETACRIT) Injectable 14225 Unit(s) IV Push every 7 days  everolimus (ZORTRESS) 2 milliGRAM(s) Oral <User Schedule>  everolimus (ZORTRESS) 2 milliGRAM(s) Oral <User Schedule>  famotidine    Tablet 40 milliGRAM(s) Oral daily  heparin   Injectable 5000 Unit(s) SubCutaneous every 12 hours  insulin glargine Injectable (LANTUS) 18 Unit(s) SubCutaneous at bedtime  insulin lispro (ADMELOG) corrective regimen sliding scale   SubCutaneous at bedtime  insulin lispro (ADMELOG) corrective regimen sliding scale   SubCutaneous three times a day before meals  insulin lispro Injectable (ADMELOG) 10 Unit(s) SubCutaneous three times a day before meals  magnesium oxide 400 milliGRAM(s) Oral daily  meropenem  IVPB 500 milliGRAM(s) IV Intermittent every 12 hours  NIFEdipine XL 30 milliGRAM(s) Oral daily  nystatin    Suspension 933885 Unit(s) Oral four times a day  predniSONE   Tablet 5 milliGRAM(s) Oral two times a day  tamsulosin 0.4 milliGRAM(s) Oral at bedtime  valGANciclovir 450 milliGRAM(s) Oral <User Schedule>  vancomycin    Solution 125 milliGRAM(s) Oral every 12 hours      PMHX/PSHX:  GIB (gastrointestinal bleeding)    GERD with esophagitis    Hepatic encephalopathy    Obesity    Fatty liver disease, nonalcoholic    Renal stones    Hypertension    Neuropathy    Hypercholesteremia    Diabetes    S/P cholecystectomy    No significant past surgical history        Family history:  Family history of type 2 diabetes mellitus    Family history of hypertension    Family history of stomach cancer    No pertinent family history in first degree relatives        ROS: As per HPI, 14-point ROS negative otherwise.    General:  No wt loss, fevers, chills, night sweats, fatigue,   Eyes:  Good vision, no reported pain  ENT:  No sore throat, pain, runny nose, dysphagia  CV:  No pain, palpitations, hypo/hypertension  Resp:  No dyspnea, cough, tachypnea, wheezing  GI:  See HPI  :  No pain, bleeding, incontinence, nocturia  Muscle:  No pain, weakness  Neuro:  No weakness, tingling, memory problems  Psych:  No fatigue, insomnia, mood problems, depression  Endocrine:  No polyuria, polydipsia, cold/heat intolerance  Heme:  No petechiae, ecchymosis, easy bruisability  Skin:  No rash, edema      PHYSICAL EXAM:     Vital Signs:  Vital Signs Last 24 Hrs  T(C): 36.8 (29 Dec 2020 05:00), Max: 37.2 (29 Dec 2020 01:00)  T(F): 98.3 (29 Dec 2020 05:00), Max: 99 (29 Dec 2020 01:00)  HR: 75 (29 Dec 2020 05:) (75 - 88)  BP: 150/78 (29 Dec 2020 05:) (133/75 - 173/75)  BP(mean): --  RR: 18 (29 Dec 2020 05:) (18 - 18)  SpO2: 98% (29 Dec 2020 05:) (97% - 100%)  Daily     Daily Weight in k.5 (29 Dec 2020 05:)    GENERAL:  appears comfortable, no acute distress  HEENT:  NC/AT,  conjunctivae clear, sclera -anicteric  CHEST:  no increased effort  HEART:  Regular rate and rhythm  ABDOMEN:  Soft, non-tender, non-distended,  no masses ,no hepato-splenomegaly,   EXTREMITIES:  no cyanosis, clubbing or edema  SKIN:  No rash/erythema/ecchymoses/petechiae/wounds  NEURO:  Alert, oriented    LABS:                        7.1    3.74  )-----------( 167      ( 29 Dec 2020 06:33 )             22.8         141  |  107  |  63<H>  ----------------------------<  155<H>  5.0   |  21<L>  |  3.27<H>    Ca    7.5<L>      29 Dec 2020 06:32  Phos  4.8       Mg     1.7         TPro  5.0<L>  /  Alb  2.7<L>  /  TBili  0.1<L>  /  DBili  x   /  AST  24  /  ALT  28  /  AlkPhos  117      LIVER FUNCTIONS - ( 29 Dec 2020 06:32 )  Alb: 2.7 g/dL / Pro: 5.0 g/dL / ALK PHOS: 117 U/L / ALT: 28 U/L / AST: 24 U/L / GGT: x           PT/INR - ( 29 Dec 2020 06:33 )   PT: 13.0 sec;   INR: 1.09 ratio         PTT - ( 29 Dec 2020 06:33 )  PTT:29.3 sec  Urinalysis Basic - ( 28 Dec 2020 11:31 )    Color: Light Yellow / Appearance: Clear / S.013 / pH: x  Gluc: x / Ketone: Negative  / Bili: Negative / Urobili: Negative   Blood: x / Protein: 30 mg/dL / Nitrite: Negative   Leuk Esterase: Large / RBC: 3 /hpf /  /HPF   Sq Epi: x / Non Sq Epi: 0 /hpf / Bacteria: Negative          Imaging:             Chief Complaint:  Patient is a 65y old  Male who presents with a chief complaint of transaminitis (28 Dec 2020 18:27)      Interval Events: No overnight events.     Allergies:  codeine (Anaphylaxis)      Hospital Medications:  aspirin  chewable 81 milliGRAM(s) Oral daily  chlorhexidine 2% Cloths 1 Application(s) Topical <User Schedule>  epoetin barry-epbx (RETACRIT) Injectable 54520 Unit(s) IV Push every 7 days  everolimus (ZORTRESS) 2 milliGRAM(s) Oral <User Schedule>  everolimus (ZORTRESS) 2 milliGRAM(s) Oral <User Schedule>  famotidine    Tablet 40 milliGRAM(s) Oral daily  heparin   Injectable 5000 Unit(s) SubCutaneous every 12 hours  insulin glargine Injectable (LANTUS) 18 Unit(s) SubCutaneous at bedtime  insulin lispro (ADMELOG) corrective regimen sliding scale   SubCutaneous at bedtime  insulin lispro (ADMELOG) corrective regimen sliding scale   SubCutaneous three times a day before meals  insulin lispro Injectable (ADMELOG) 10 Unit(s) SubCutaneous three times a day before meals  magnesium oxide 400 milliGRAM(s) Oral daily  meropenem  IVPB 500 milliGRAM(s) IV Intermittent every 12 hours  NIFEdipine XL 30 milliGRAM(s) Oral daily  nystatin    Suspension 433175 Unit(s) Oral four times a day  predniSONE   Tablet 5 milliGRAM(s) Oral two times a day  tamsulosin 0.4 milliGRAM(s) Oral at bedtime  valGANciclovir 450 milliGRAM(s) Oral <User Schedule>  vancomycin    Solution 125 milliGRAM(s) Oral every 12 hours      PMHX/PSHX:  GIB (gastrointestinal bleeding)    GERD with esophagitis    Hepatic encephalopathy    Obesity    Fatty liver disease, nonalcoholic    Renal stones    Hypertension    Neuropathy    Hypercholesteremia    Diabetes    S/P cholecystectomy    No significant past surgical history        Family history:  Family history of type 2 diabetes mellitus    Family history of hypertension    Family history of stomach cancer    No pertinent family history in first degree relatives        ROS: As per HPI, 14-point ROS negative otherwise.    General:  No wt loss, fevers, chills, night sweats, fatigue,   Eyes:  Good vision, no reported pain  ENT:  No sore throat, pain, runny nose, dysphagia  CV:  No pain, palpitations, hypo/hypertension  Resp:  No dyspnea, cough, tachypnea, wheezing  GI:  See HPI  :  No pain, bleeding, incontinence, nocturia  Muscle:  No pain, weakness  Neuro:  No weakness, tingling, memory problems  Psych:  No fatigue, insomnia, mood problems, depression  Endocrine:  No polyuria, polydipsia, cold/heat intolerance  Heme:  No petechiae, ecchymosis, easy bruisability  Skin:  No rash, edema      PHYSICAL EXAM:     Vital Signs:  Vital Signs Last 24 Hrs  T(C): 36.8 (29 Dec 2020 05:00), Max: 37.2 (29 Dec 2020 01:00)  T(F): 98.3 (29 Dec 2020 05:00), Max: 99 (29 Dec 2020 01:00)  HR: 75 (29 Dec 2020 05:) (75 - 88)  BP: 150/78 (29 Dec 2020 05:) (133/75 - 173/75)  BP(mean): --  RR: 18 (29 Dec 2020 05:) (18 - 18)  SpO2: 98% (29 Dec 2020 05:) (97% - 100%)  Daily     Daily Weight in k.5 (29 Dec 2020 05:)    GENERAL:  appears comfortable, no acute distress  HEENT:  NC/AT,  conjunctivae clear, sclera -anicteric  CHEST:  no increased effort  HEART:  Regular rate and rhythm  ABDOMEN:  Soft, non-tender, non-distended,  no masses ,no hepato-splenomegaly,   EXTREMITIES:  no cyanosis, clubbing or edema  SKIN:  No rash/erythema/ecchymoses/petechiae/wounds  NEURO:  Alert, oriented    LABS:                        7.1    3.74  )-----------( 167      ( 29 Dec 2020 06:33 )             22.8         141  |  107  |  63<H>  ----------------------------<  155<H>  5.0   |  21<L>  |  3.27<H>    Ca    7.5<L>      29 Dec 2020 06:32  Phos  4.8       Mg     1.7         TPro  5.0<L>  /  Alb  2.7<L>  /  TBili  0.1<L>  /  DBili  x   /  AST  24  /  ALT  28  /  AlkPhos  117      LIVER FUNCTIONS - ( 29 Dec 2020 06:32 )  Alb: 2.7 g/dL / Pro: 5.0 g/dL / ALK PHOS: 117 U/L / ALT: 28 U/L / AST: 24 U/L / GGT: x           PT/INR - ( 29 Dec 2020 06:33 )   PT: 13.0 sec;   INR: 1.09 ratio         PTT - ( 29 Dec 2020 06:33 )  PTT:29.3 sec  Urinalysis Basic - ( 28 Dec 2020 11:31 )    Color: Light Yellow / Appearance: Clear / S.013 / pH: x  Gluc: x / Ketone: Negative  / Bili: Negative / Urobili: Negative   Blood: x / Protein: 30 mg/dL / Nitrite: Negative   Leuk Esterase: Large / RBC: 3 /hpf /  /HPF   Sq Epi: x / Non Sq Epi: 0 /hpf / Bacteria: Negative          Imaging:

## 2020-12-29 NOTE — PROGRESS NOTE ADULT - SUBJECTIVE AND OBJECTIVE BOX
Transplant Surgery - Multidisciplinary Rounds  --------------------------------------------------------------  s/p OLT 7/2/2020      Admitted 12/14/2020 with transaminitis, MIAS, diarrhea    Present: Patient seen and examined with multidisciplinary team including Transplant Surgeon: Dr. Alonzo, Transplant Nephrologist: Dr. Salvador, Pharmacist: Jessica Nagel. ACPs: Otilia, and unit RN during am rounds.  Disciplines not in attendance will be notified of the plan.     HPI: 65M with PMHx of IDDM, HLD, obesity, HFpEF with mild LV diastolic dysfunction, MGUS, chronic anemia with a history of duodenal ulcer 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  Underwent OLT on 7/2/2020, post op course c/b VRE bacteremia tx with linezolid and delirium likely in setting of CNI toxicity (FK discontinued/Everolimus initiated 7/27).    Recent admission from 11/20-12/2 for OM of the R heel, s/p debridement with Podiatry, and was discharged with Cefepime x 6 weeks via PICC line (placed 12/1). Initial wound cx on admission grew Pseudomonas, OR cx grew staph epi. Was also found with neutropenia and Valcyte was reduced (was on 900mg daily CMV +/-).      Sent to ED from clinic with rising transaminitis and MISA (SCr 3.3 from 1.5). Hospital course c/b C diff colitis and worsening renal function requiring HD x 2 (12/19 and 12/22)    Interval Events:  - afebrile, stable VSS.   - Renal function improving: SCr 3.2, good u/o 2.5L   - R LE doppler negative for DVT    Potential Discharge date: Wednesday     Education:  Medications    Plan of care:  See Below    MEDICATIONS  (STANDING):  aspirin  chewable 81 milliGRAM(s) Oral daily  chlorhexidine 2% Cloths 1 Application(s) Topical <User Schedule>  epoetin barry-epbx (RETACRIT) Injectable 11073 Unit(s) IV Push every 7 days  epoetin barry-epbx (RETACRIT) Injectable 87257 Unit(s) SubCutaneous once  everolimus (ZORTRESS) 2 milliGRAM(s) Oral <User Schedule>  everolimus (ZORTRESS) 2 milliGRAM(s) Oral <User Schedule>  famotidine    Tablet 40 milliGRAM(s) Oral daily  heparin   Injectable 5000 Unit(s) SubCutaneous every 12 hours  insulin glargine Injectable (LANTUS) 18 Unit(s) SubCutaneous at bedtime  insulin lispro (ADMELOG) corrective regimen sliding scale   SubCutaneous at bedtime  insulin lispro (ADMELOG) corrective regimen sliding scale   SubCutaneous three times a day before meals  insulin lispro Injectable (ADMELOG) 10 Unit(s) SubCutaneous three times a day before meals  magnesium oxide 400 milliGRAM(s) Oral daily  meropenem  IVPB 500 milliGRAM(s) IV Intermittent every 12 hours  NIFEdipine XL 30 milliGRAM(s) Oral daily  nystatin    Suspension 856686 Unit(s) Oral four times a day  tamsulosin 0.4 milliGRAM(s) Oral at bedtime  valGANciclovir 450 milliGRAM(s) Oral <User Schedule>  vancomycin    Solution 125 milliGRAM(s) Oral every 12 hours      PAST MEDICAL & SURGICAL HISTORY:  GIB (gastrointestinal bleeding)  GERD with esophagitis  Gastritis &amp; Non Bleeding Ulcers  Hepatic encephalopathy  Obesity  Fatty liver disease, nonalcoholic  Renal stones 25 years ago  Hypertension  Neuropathy  Hypercholesteremia  Diabetes  S/P cholecystectomy    Vital Signs Last 24 Hrs  T(C): 36.6 (29 Dec 2020 09:00), Max: 37.2 (29 Dec 2020 01:00)  T(F): 97.8 (29 Dec 2020 09:00), Max: 99 (29 Dec 2020 01:00)  HR: 75 (29 Dec 2020 05:00) (75 - 88)  BP: 145/66 (29 Dec 2020 09:00) (133/75 - 173/75)  BP(mean): --  RR: 18 (29 Dec 2020 09:00) (18 - 18)  SpO2: 97% (29 Dec 2020 09:00) (97% - 100%)    I&O's Summary    28 Dec 2020 07:01  -  29 Dec 2020 07:00  --------------------------------------------------------  IN: 1340 mL / OUT: 2500 mL / NET: -1160 mL    29 Dec 2020 07:01  -  29 Dec 2020 10:32  --------------------------------------------------------  IN: 120 mL / OUT: 250 mL / NET: -130 mL                         7.1    3.74  )-----------( 167      ( 29 Dec 2020 06:33 )             22.8     12-29    141  |  107  |  63<H>  ----------------------------<  155<H>  5.0   |  21<L>  |  3.27<H>    Ca    7.5<L>      29 Dec 2020 06:32  Phos  4.8     12-29  Mg     1.7     12-29    TPro  5.0<L>  /  Alb  2.7<L>  /  TBili  0.1<L>  /  DBili  x   /  AST  24  /  ALT  28  /  AlkPhos  117  12-29    Babesia microti PCR, Blood. (collected 12-25-20 @ 09:01)  Source: .Blood  Final Report (12-25-20 @ 12:15):    Babesia microti PCR    Results: NOT detected    ***************Result Note*************    The detection of Babesia microti by PCR has only been    validated for whole blood; this test has not been approved    by the US Food and Drug Administration (FDA). Performance    characteristics of this assay have been determined by    [employer]. The clinical significance    of results should be considered in conjunction with the    overall clinical presentation of the patient. Result is not    intended to be used as the sole means for clinical diagnosis    or patient management decisions.    One negative sample does not necessarily rule    out the presence of a parasitic infection.    Review of systems  Gen: afebrile  Skin:  R heel wound   Head/Eyes/Ears/Mouth: No headache; Normal hearing; Normal vision w/o blurriness; No sinus pain/discomfort, sore throat  Respiratory: No dyspnea, cough, wheezing, hemoptysis  CV: No chest pain, PND, orthopnea  GI:  denies abdominal pain, diarrhea, constipation, nausea, vomiting, melena, hematochezia  : No increased frequency, dysuria, hematuria, nocturia  MSK: No joint pain/swelling; no back pain; no edema  Neuro: No dizziness/lightheadedness, weakness, seizures, numbness, tingling  Heme: No easy bruising or bleeding  Endo: No heat/cold intolerance  Psych: No significant nervousness, anxiety, stress, depression  All other systems were reviewed and are negative, except as noted.      PHYSICAL EXAM:  Constitutional: Well developed / well nourished  Eyes: Anicteric, PERRLA  ENMT: nc/at  Respiratory: CTA B/L  Cardiovascular: RRR, + heart murmur  Gastrointestinal: Soft, mild distention, non tender, incision well healed  Genitourinary: Voiding spontaneously  Extremities: SCD's in place and working bilaterally. R LE edema  Vascular: Palpable dp pulses bilaterally  Neurological: A&O x3  Skin: R heel ulcer: no drainage, no cellulitis  Musculoskeletal: Moving all extremities  Psychiatric: Responsive               Transplant Surgery - Multidisciplinary Rounds  --------------------------------------------------------------  s/p OLT 7/2/2020      Admitted 12/14/2020 with transaminitis, MISA, diarrhea    Present: Patient seen and examined with multidisciplinary team including Transplant Surgeon: Dr. Alonzo, Dr. Gray, Transplant Hepatologist Dr. Medina,  Transplant Nephrologist: Dr. Salvador, Pharmacist: Jessica Nagel. ACPs: Patrick Bingham and unit RN during am rounds.  Disciplines not in attendance will be notified of the plan.     HPI: 65M with PMHx of IDDM, HLD, obesity, HFpEF with mild LV diastolic dysfunction, MGUS, chronic anemia with a history of duodenal ulcer 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  Underwent OLT on 7/2/2020, post op course c/b VRE bacteremia tx with linezolid and delirium likely in setting of CNI toxicity (FK discontinued/Everolimus initiated 7/27).    Recent admission from 11/20-12/2 for OM of the R heel, s/p debridement with Podiatry, and was discharged with Cefepime x 6 weeks via PICC line (placed 12/1). Initial wound cx on admission grew Pseudomonas, OR cx grew staph epi. Was also found with neutropenia and Valcyte was reduced (was on 900mg daily CMV +/-).      Sent to ED from clinic with rising transaminitis and MISA (SCr 3.3 from 1.5). Hospital course c/b C diff colitis and worsening renal function requiring HD x 2 (12/19 and 12/22)    Interval Events:  - afebrile, stable VSS.   - Renal function improving: SCr 3.2, good u/o 2.5L   - R LE doppler negative for DVT    Potential Discharge date: Wednesday     Education:  Medications    Plan of care:  See Below    MEDICATIONS  (STANDING):  aspirin  chewable 81 milliGRAM(s) Oral daily  chlorhexidine 2% Cloths 1 Application(s) Topical <User Schedule>  epoetin barry-epbx (RETACRIT) Injectable 53314 Unit(s) IV Push every 7 days  epoetin barry-epbx (RETACRIT) Injectable 98717 Unit(s) SubCutaneous once  everolimus (ZORTRESS) 2 milliGRAM(s) Oral <User Schedule>  everolimus (ZORTRESS) 2 milliGRAM(s) Oral <User Schedule>  famotidine    Tablet 40 milliGRAM(s) Oral daily  heparin   Injectable 5000 Unit(s) SubCutaneous every 12 hours  insulin glargine Injectable (LANTUS) 18 Unit(s) SubCutaneous at bedtime  insulin lispro (ADMELOG) corrective regimen sliding scale   SubCutaneous at bedtime  insulin lispro (ADMELOG) corrective regimen sliding scale   SubCutaneous three times a day before meals  insulin lispro Injectable (ADMELOG) 10 Unit(s) SubCutaneous three times a day before meals  magnesium oxide 400 milliGRAM(s) Oral daily  meropenem  IVPB 500 milliGRAM(s) IV Intermittent every 12 hours  NIFEdipine XL 30 milliGRAM(s) Oral daily  nystatin    Suspension 734757 Unit(s) Oral four times a day  tamsulosin 0.4 milliGRAM(s) Oral at bedtime  valGANciclovir 450 milliGRAM(s) Oral <User Schedule>  vancomycin    Solution 125 milliGRAM(s) Oral every 12 hours      PAST MEDICAL & SURGICAL HISTORY:  GIB (gastrointestinal bleeding)  GERD with esophagitis  Gastritis &amp; Non Bleeding Ulcers  Hepatic encephalopathy  Obesity  Fatty liver disease, nonalcoholic  Renal stones 25 years ago  Hypertension  Neuropathy  Hypercholesteremia  Diabetes  S/P cholecystectomy    Vital Signs Last 24 Hrs  T(C): 36.6 (29 Dec 2020 09:00), Max: 37.2 (29 Dec 2020 01:00)  T(F): 97.8 (29 Dec 2020 09:00), Max: 99 (29 Dec 2020 01:00)  HR: 75 (29 Dec 2020 05:00) (75 - 88)  BP: 145/66 (29 Dec 2020 09:00) (133/75 - 173/75)  BP(mean): --  RR: 18 (29 Dec 2020 09:00) (18 - 18)  SpO2: 97% (29 Dec 2020 09:00) (97% - 100%)    I&O's Summary    28 Dec 2020 07:01  -  29 Dec 2020 07:00  --------------------------------------------------------  IN: 1340 mL / OUT: 2500 mL / NET: -1160 mL    29 Dec 2020 07:01  -  29 Dec 2020 10:32  --------------------------------------------------------  IN: 120 mL / OUT: 250 mL / NET: -130 mL                         7.1    3.74  )-----------( 167      ( 29 Dec 2020 06:33 )             22.8     12-29    141  |  107  |  63<H>  ----------------------------<  155<H>  5.0   |  21<L>  |  3.27<H>    Ca    7.5<L>      29 Dec 2020 06:32  Phos  4.8     12-29  Mg     1.7     12-29    TPro  5.0<L>  /  Alb  2.7<L>  /  TBili  0.1<L>  /  DBili  x   /  AST  24  /  ALT  28  /  AlkPhos  117  12-29    Babesia microti PCR, Blood. (collected 12-25-20 @ 09:01)  Source: .Blood  Final Report (12-25-20 @ 12:15):    Babesia microti PCR    Results: NOT detected    ***************Result Note*************    The detection of Babesia microti by PCR has only been    validated for whole blood; this test has not been approved    by the US Food and Drug Administration (FDA). Performance    characteristics of this assay have been determined by    [employer]. The clinical significance    of results should be considered in conjunction with the    overall clinical presentation of the patient. Result is not    intended to be used as the sole means for clinical diagnosis    or patient management decisions.    One negative sample does not necessarily rule    out the presence of a parasitic infection.    Review of systems  Gen: afebrile  Skin:  R heel wound   Head/Eyes/Ears/Mouth: No headache; Normal hearing; Normal vision w/o blurriness; No sinus pain/discomfort, sore throat  Respiratory: No dyspnea, cough, wheezing, hemoptysis  CV: No chest pain, PND, orthopnea  GI:  denies abdominal pain, diarrhea, constipation, nausea, vomiting, melena, hematochezia  : No increased frequency, dysuria, hematuria, nocturia  MSK: No joint pain/swelling; no back pain; no edema  Neuro: No dizziness/lightheadedness, weakness, seizures, numbness, tingling  Heme: No easy bruising or bleeding  Endo: No heat/cold intolerance  Psych: No significant nervousness, anxiety, stress, depression  All other systems were reviewed and are negative, except as noted.      PHYSICAL EXAM:  Constitutional: Well developed / well nourished  Eyes: Anicteric, PERRLA  ENMT: nc/at  Respiratory: CTA B/L  Cardiovascular: RRR, + heart murmur  Gastrointestinal: Soft, mild distention, non tender, incision well healed  Genitourinary: Voiding spontaneously  Extremities: SCD's in place and working bilaterally. R LE edema  Vascular: Palpable dp pulses bilaterally  Neurological: A&O x3  Skin: R heel ulcer: no drainage, no cellulitis  Musculoskeletal: Moving all extremities  Psychiatric: Responsive

## 2020-12-29 NOTE — PROGRESS NOTE ADULT - ATTENDING COMMENTS
MISA, non oliguric without diuretic  Improving ceratinine  Liver Transplant recipient with functioning allograft  Creatinine trend noted  Comorbidities reviewed. DM, HTN, Foot ulcer  Patient seen, examined and reviewed available clinical and lab data including history,  progress notes and consult notes.  Reviewed immunosuppression and allograft function including urine out put, creatinine trend, urine studies and abdominal imaging.  Reviewed medication regimen for glycemic control and blood pressure control  Suggestions:  1. Monitor creatinine, electrolytes  2. Avoid dehydration  Immunosuppression managed by liver transplant team  Will follow  I was present during and reviewed clinical and lab data as well as assessment and plan as documented . Please contact if any additional questions with any change in clinical condition or on availability of any additional information or reports.

## 2020-12-29 NOTE — PROGRESS NOTE BEHAVIORAL HEALTH - NSBHCONSULTFOLLOWAFTERCARE_PSY_A_CORE FT
pt seemed receptive to starting therapy/couples therapy  OP psych f/u at [facility]- [phone]  Provided [facility] contact for crisis  other safety plan reviewed with pt  may consider SW eval  follow up with inhouse    May connect family with [facility] support group, (discussed with pt)

## 2020-12-29 NOTE — PROGRESS NOTE ADULT - SUBJECTIVE AND OBJECTIVE BOX
--------------------------------------------------------------------------------  Chief Complaint: No new symptoms    24 hour events/subjective:  Reviewed      PAST HISTORY  --------------------------------------------------------------------------------  No significant changes to PMH, PSH, FHx, SHx, unless otherwise noted    ALLERGIES & MEDICATIONS  --------------------------------------------------------------------------------  Allergies    codeine (Anaphylaxis)    Intolerances      Standing Inpatient Medications  aspirin  chewable 81 milliGRAM(s) Oral daily  chlorhexidine 2% Cloths 1 Application(s) Topical <User Schedule>  epoetin barry-epbx (RETACRIT) Injectable 64616 Unit(s) IV Push every 7 days  everolimus (ZORTRESS) 3 milliGRAM(s) Oral <User Schedule>  famotidine    Tablet 40 milliGRAM(s) Oral daily  heparin   Injectable 5000 Unit(s) SubCutaneous every 12 hours  insulin glargine Injectable (LANTUS) 18 Unit(s) SubCutaneous at bedtime  insulin lispro (ADMELOG) corrective regimen sliding scale   SubCutaneous at bedtime  insulin lispro (ADMELOG) corrective regimen sliding scale   SubCutaneous three times a day before meals  insulin lispro Injectable (ADMELOG) 10 Unit(s) SubCutaneous three times a day before meals  magnesium oxide 400 milliGRAM(s) Oral daily  meropenem  IVPB 500 milliGRAM(s) IV Intermittent every 12 hours  NIFEdipine XL 30 milliGRAM(s) Oral daily  nystatin    Suspension 552468 Unit(s) Oral four times a day  tamsulosin 0.4 milliGRAM(s) Oral at bedtime  valGANciclovir 450 milliGRAM(s) Oral <User Schedule>  vancomycin    Solution 125 milliGRAM(s) Oral every 12 hours    PRN Inpatient Medications      REVIEW OF SYSTEMS  --------------------------------------------------------------------------------  No pain/SOB/dizziness/fever/chills  All other systems were reviewed and are negative, except as noted.    VITALS/PHYSICAL EXAM  --------------------------------------------------------------------------------  T(C): 36.6 (12-29-20 @ 13:00), Max: 37.2 (12-29-20 @ 01:00)  HR: 78 (12-29-20 @ 13:00) (75 - 88)  BP: 133/65 (12-29-20 @ 13:00) (133/65 - 173/75)  RR: 18 (12-29-20 @ 13:00) (18 - 18)  SpO2: 98% (12-29-20 @ 13:00) (97% - 100%)        12-28-20 @ 07:01  -  12-29-20 @ 07:00  --------------------------------------------------------  IN: 1340 mL / OUT: 2500 mL / NET: -1160 mL    12-29-20 @ 07:01  -  12-29-20 @ 16:58  --------------------------------------------------------  IN: 120 mL / OUT: 600 mL / NET: -480 mL      Physical Exam:  	Gen: NAD, well-appearing  	HEENT: PERRL, supple neck, clear oropharynx  	Pulm: CTA B/L  	CV: RRR, S1S2; no rub  	Abd: +BS, soft, nontender/nondistended       	UE: Warm, FROM, intact strength; no edema; no asterixis  	LE: Warm, FROM, intact strength; no edema  	Neuro: No asterixis  	Psych: Normal affect and mood  	Skin: Warm, without rashes  Foot dressing noted R      LABS/STUDIES  --------------------------------------------------------------------------------              7.1    3.74  >-----------<  167      [12-29-20 @ 06:33]              22.8     141  |  107  |  63  ----------------------------<  155      [12-29-20 @ 06:32]  5.0   |  21  |  3.27        Ca     7.5     [12-29-20 @ 06:32]      Mg     1.7     [12-29-20 @ 06:32]      Phos  4.8     [12-29-20 @ 06:32]    TPro  5.0  /  Alb  2.7  /  TBili  0.1  /  DBili  x   /  AST  24  /  ALT  28  /  AlkPhos  117  [12-29-20 @ 06:32]    PT/INR: PT 13.0 , INR 1.09       [12-29-20 @ 06:33]  PTT: 29.3       [12-29-20 @ 06:33]      Creatinine Trend:  SCr 3.27 [12-29 @ 06:32]  SCr 4.05 [12-28 @ 06:06]  SCr 4.14 [12-27 @ 12:12]  SCr 4.32 [12-27 @ 06:30]  SCr 4.49 [12-26 @ 06:31]              Urinalysis - [12-28-20 @ 11:31]      Color Light Yellow / Appearance Clear / SG 1.013 / pH 6.5      Gluc 200 mg/dL / Ketone Negative  / Bili Negative / Urobili Negative       Blood Small / Protein 30 mg/dL / Leuk Est Large / Nitrite Negative      RBC 3 /  / Hyaline 4 / Gran  / Sq Epi  / Non Sq Epi 0 / Bacteria Negative    Urine Creatinine <2      [12-28-20 @ 11:31]  Urine Protein 37      [12-28-20 @ 11:31]    Iron 160, TIBC Unable to calculate Test Repeated, %sat Unable to calculate Test Repeated      [06-12-20 @ 09:04]  Ferritin 493      [04-29-20 @ 08:20]  HbA1c 6.4      [02-12-20 @ 17:08]  TSH 3.26      [04-26-20 @ 09:47]  Lipid: chol 158, , HDL 44, LDL 93      [08-05-20 @ 08:57]    HBsAg Nonreact      [12-19-20 @ 14:40]  HCV 0.06, Nonreact      [12-19-20 @ 14:40]    C3 Complement 158      [12-17-20 @ 16:43]  C4 Complement 38      [12-17-20 @ 16:43]

## 2020-12-29 NOTE — PROGRESS NOTE ADULT - NSHPATTENDINGPLANDISCUSS_GEN_ALL_CORE
PA, resident, nurse, nephrologist
PA, nurse, nephrologist, resident, hepatologist
PA, resident, nurse, nephrologist
PA, nurse, nephrologist
patient, ICU team.
Transplant team
Transplant team

## 2020-12-29 NOTE — PROGRESS NOTE BEHAVIORAL HEALTH - SUMMARY
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  Underwent OLT on 7/2/2020, post op course c/b VRE bacteremia tx with linezolid and delirium likely in setting of CNI toxicity (FK discontinued/Everolimus initiated 7/27). Previous admission in 11/20-12/2 for OM of the R heel, s/p debridement with Podiatry, had PICC line (placed 12/1). Pt currently is being treated for OM and MISA. Consult for depression. seen today as followup    Patient seen and evaluated, awake and alert oriented x3     Pt was calm cooperative remains goal oriented. Pt reports some depression about being in the hospital.  denies current SI/HI.  She denies pt expressing suicidal ideations or plan to her. Currently denies A/V Hallucinations.

## 2020-12-29 NOTE — PROGRESS NOTE ADULT - ATTENDING COMMENTS
Hepatology Staff: Yomi Medina MD    I saw and examined the patient along with  Dr. Singh 12-29-20 @ 12:05.    Patient Medical Record, hosptial course was reviewed and summarized as below:    Vitals: Vital Signs Last 24 Hrs  T(C): 36.6 (29 Dec 2020 09:00), Max: 37.2 (29 Dec 2020 01:00)  T(F): 97.8 (29 Dec 2020 09:00), Max: 99 (29 Dec 2020 01:00)  HR: 75 (29 Dec 2020 05:00) (75 - 88)  BP: 145/66 (29 Dec 2020 09:00) (133/75 - 173/75)  RR: 18 (29 Dec 2020 09:00) (18 - 18)  SpO2: 97% (29 Dec 2020 09:00) (97% - 100%)    Antibiotics:vancomycin    Solution 125 milliGRAM(s) Oral every 12 hours    Diuretics:  Labs:INR: 1.09 ratio (12-29-20 @ 06:33)  Creatinine, Serum: 3.27 mg/dL (12-29-20 @ 06:32)  Bilirubin Total, Serum: 0.1 mg/dL (12-29-20 @ 06:32)      I/O: I&O's Summary    28 Dec 2020 07:01  -  29 Dec 2020 07:00  --------------------------------------------------------  IN: 1340 mL / OUT: 2500 mL / NET: -1160 mL    29 Dec 2020 07:01  -  29 Dec 2020 12:05  --------------------------------------------------------  IN: 120 mL / OUT: 250 mL / NET: -130 mL      Nutritional Status:   Albumin, Serum: 2.7 g/dL (12-29-20 @ 06:32)    Last 24 hour events: Clinically stable, good graft function.     Recommendations: Cont with current abx ( IV Lachelle + IV Vanc) for osteomyelitis. MISA- improving. Blood sugar needs better control. Cont with Everolimus 2/2 ( level is low). Will adjust on follow up as outpatient. Keep CellCept and Bactrim on hold. Reduce Prednisone to 5 mg daily.       Plan discussed with Primary team.

## 2020-12-29 NOTE — PROGRESS NOTE ADULT - ASSESSMENT
64yo M with PMHx of IDDM, HLD, obesity, HFpEF, with mild LV diastolic dysfunction, MGUS, chronic anemia with a history of duodenal ulcer as well as GAVE and duodenal AVM s/p APC (last on 10/11/19), decompensated JEAN/Cirrhosis 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, post op course c/b VRE bacteremia tx with linezolid and delirium likely in setting of CNI toxicity (FK discontinued/Everolimus initiated 7/27). Recent admission from 11/20-12/2 for OM of the R heel, s/p debridement with Podiatry, and was discharged with Cefepime x 6 weeks via PICC line (placed 12/1). Initial wound cx on admission grew Pseudomonas, OR cx grew staph epi. Was also found with neutropenia and Valcyte was reduced (was on 900mg daily CMV +/-) Sent to ED from clinic with rising transaminitis and MISA (SCr 3.3 from 1.5). Hospital course c/b C diff colitis and worsening renal function requiring HD.      Impression:   # C. diff: on po vanco since 12/15. Continues to improved from symptom standpoint. No diarrhea and no contact precautiosn needed  # MISA with c/f uremia: mental status stable. Cr continues to improve, now 3.2. Nonoliguric, has good urine output. Only required HD x 2 12/19 and 12/22,  Kidney slightly enlarged on u/s with labs and clinical picture consistent with AIN although cannot exclude acute tubular necrosis with interstitial features. FeNa and FeUrea suggest possible AIN (from cefepime) Cr has been gradually improving and has good UOP . Shiley to be removed today  ( placed over a week ago) to avoid any new infection source.   # Fever: resolved. ( Last fever 12/22) CT scan unrevealing for source. Possible secondary to AIN. Underlying infection (bladder source?) is on differential. Has cdiff which is resolving. Possible osteo though low suspicion from ID and podiatry teams as patient was on targeted antibiotics with clinical appearance of a healing wound.  # Right foot osteomyelitis:  reviewed by podiatry, appears stable. s/p surgical debridement and graft placement on 11/24 by Podiatry.   Wound culture grew pseudomonas and bone bx on 11/24 growing S. epidermidis - likely contaminant. Had PICC line, now removed. On meropenem for 1 more day  # Otitis externa: on floxin drops  # S/p liver transplant 7/2/20:     Recipient Info:   ABO: A  CMV:  Neg  EBV Positive  Donor:  Donor ID:  [id number] Match: [id number]  Age: 29 ABO:  A1 High Risk:   No COD: Cardiovascular  Anti CMV positive, EBV IgG- positive  HepBcAb-neg, Hepatitis C-DANA- neg, Hepatitis C ab-neg    Recommendations:  - continue with po vanco, meropenum per transplant ID and primary team  - immunosuppressives with everolimus and prednisone per transplant surgery  - strict glycemic control  - hold bactrim given MISA  - monitor CBC, CMP, INR  - monitor I/Os  - manage dosing of immunosuppression as outpatient as multiple infections currently recovering  - transplant hepatology to follow

## 2020-12-29 NOTE — PROGRESS NOTE BEHAVIORAL HEALTH - NSBHFUPINTERVALHXFT_PSY_A_CORE
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  Underwent OLT on 7/2/2020, post op course c/b VRE bacteremia tx with linezolid and delirium likely in setting of CNI toxicity (FK discontinued/Everolimus initiated 7/27). Previous admission in 11/20-12/2 for OM of the R heel, s/p debridement with Podiatry, had PICC line (placed 12/1). Pt currently is being treated for OM and MISA. Consult for depression, seen today as follow up      Patient seen and evaluated with Dr. Manzano   pt was seen pt was calm cooperative, less hyperverbal then previous eval.   He relates "I'll take to someone" He relates he does not want to take more meds but is open to therapy. He relates motivation to go home and spend time with his family. Pt remains goal oriented. pt denies si/hi denies a/v hallucinations.   Safety plan reviewed with pt who relates being familiar with [facility].

## 2020-12-29 NOTE — PROGRESS NOTE ADULT - SUBJECTIVE AND OBJECTIVE BOX
Follow Up:  s/p OLT, Fever, Heel OM    Interval History:    REVIEW OF SYSTEMS  [  ] ROS unobtainable because:    [  ] All other systems negative except as noted below    Constitutional:  [ ] fever [ ] chills  [ ] weight loss  [ ] weakness  Skin:  [ ] rash [ ] phlebitis	  Eyes: [ ] icterus [ ] pain  [ ] discharge	  ENMT: [ ] sore throat  [ ] thrush [ ] ulcers [ ] exudates  Respiratory: [ ] dyspnea [ ] hemoptysis [ ] cough [ ] sputum	  Cardiovascular:  [ ] chest pain [ ] palpitations [ ] edema	  Gastrointestinal:  [ ] nausea [ ] vomiting [ ] diarrhea [ ] constipation [ ] pain	  Genitourinary:  [ ] dysuria [ ] frequency [ ] hematuria [ ] discharge [ ] flank pain  [ ] incontinence  Musculoskeletal:  [ ] myalgias [ ] arthralgias [ ] arthritis  [ ] back pain  Neurological:  [ ] headache [ ] seizures  [ ] confusion/altered mental status    Allergies  codeine (Anaphylaxis)        ANTIMICROBIALS:  meropenem  IVPB 500 every 12 hours  nystatin    Suspension 912960 four times a day  valGANciclovir 450 <User Schedule>  vancomycin    Solution 125 every 12 hours      OTHER MEDS:  MEDICATIONS  (STANDING):  aspirin  chewable 81 daily  epoetin barry-epbx (RETACRIT) Injectable 60023 every 7 days  everolimus (ZORTRESS) 3 <User Schedule>  famotidine    Tablet 40 daily  heparin   Injectable 5000 every 12 hours  insulin glargine Injectable (LANTUS) 18 at bedtime  insulin lispro (ADMELOG) corrective regimen sliding scale  at bedtime  insulin lispro (ADMELOG) corrective regimen sliding scale  three times a day before meals  insulin lispro Injectable (ADMELOG) 10 three times a day before meals  NIFEdipine XL 30 daily  tamsulosin 0.4 at bedtime      Vital Signs Last 24 Hrs  T(C): 36.9 (29 Dec 2020 17:00), Max: 37.2 (29 Dec 2020 01:00)  T(F): 98.5 (29 Dec 2020 17:00), Max: 99 (29 Dec 2020 01:00)  HR: 83 (29 Dec 2020 17:00) (75 - 88)  BP: 162/78 (29 Dec 2020 17:00) (133/65 - 162/78)  BP(mean): 111 (29 Dec 2020 17:00) (111 - 111)  RR: 18 (29 Dec 2020 17:00) (18 - 18)  SpO2: 100% (29 Dec 2020 17:00) (97% - 100%)    PHYSICAL EXAMINATION:  General: Alert and Awake, NAD  HEENT: PERRL, EOMI  Neck: Supple  Cardiac: RRR, No M/R/G  Resp: CTAB, No Wh/Rh/Ra  Abdomen: NBS, NT/ND, No HSM, No rigidity or guarding  MSK: No LE edema. No Calf tenderness  : No bob  Skin: No rashes or lesions. Skin is warm and dry to the touch.   Neuro: Alert and Awake. CN 2-12 Grossly intact. Moves all four extremities spontaneously.  Psych: Calm, Pleasant, Cooperative                          7.1    3.74  )-----------( 167      ( 29 Dec 2020 06:33 )             22.8           141  |  107  |  63<H>  ----------------------------<  155<H>  5.0   |  21<L>  |  3.27<H>    Ca    7.5<L>      29 Dec 2020 06:32  Phos  4.8       Mg     1.7         TPro  5.0<L>  /  Alb  2.7<L>  /  TBili  0.1<L>  /  DBili  x   /  AST  24  /  ALT  28  /  AlkPhos  117        Urinalysis Basic - ( 28 Dec 2020 11:31 )    Color: Light Yellow / Appearance: Clear / S.013 / pH: x  Gluc: x / Ketone: Negative  / Bili: Negative / Urobili: Negative   Blood: x / Protein: 30 mg/dL / Nitrite: Negative   Leuk Esterase: Large / RBC: 3 /hpf /  /HPF   Sq Epi: x / Non Sq Epi: 0 /hpf / Bacteria: Negative        MICROBIOLOGY:  v  .Blood  20   Babesia microti PCR  Results: NOT detected  ***************Result Note*************  The detection of Babesia microti by PCR has only been  validated for whole blood; this test has not been approved  by the US Food and Drug Administration (FDA). Performance  characteristics of this assay have been determined by  [facility]. The clinical significance  of results should be considered in conjunction with the  overall clinical presentation of the patient. Result is not  intended to be used as the sole means for clinical diagnosis  or patient management decisions.  One negative sample does not necessarily rule  out the presence of a parasitic infection.  --  --      .Blood Blood-Peripheral  20   No Growth Final  --  --      .Urine Catheterized  20   No growth  --  --      .Catheter PICC line tip  12-15-20   < 15 colonies Coag Negative Staphylococcus  --  --      .Urine Clean Catch (Midstream)  12-15-20   <10,000 CFU/mL Normal Urogenital Shantel  --  --      .Blood PICC/PERC Single Lumen  20   No Growth Final  --  --                RADIOLOGY:    <The imaging below has been reviewed and visualized by me independently. Findings as detailed in report below> Follow Up:  s/p OLT, Fever, Heel OM    Interval History: afebrile. no n/v/d    REVIEW OF SYSTEMS  [  ] ROS unobtainable because:    [x  ] All other systems negative except as noted below    Constitutional:  [ ] fever [ ] chills  [ ] weight loss  [ ] weakness  Skin:  [ ] rash [ ] phlebitis	  Eyes: [ ] icterus [ ] pain  [ ] discharge	  ENMT: [ ] sore throat  [ ] thrush [ ] ulcers [ ] exudates  Respiratory: [ ] dyspnea [ ] hemoptysis [ ] cough [ ] sputum	  Cardiovascular:  [ ] chest pain [ ] palpitations [ ] edema	  Gastrointestinal:  [ ] nausea [ ] vomiting [ ] diarrhea [ ] constipation [ ] pain	  Genitourinary:  [ ] dysuria [ ] frequency [ ] hematuria [ ] discharge [ ] flank pain  [ ] incontinence  Musculoskeletal:  [ ] myalgias [ ] arthralgias [ ] arthritis  [ ] back pain  Neurological:  [ ] headache [ ] seizures  [ ] confusion/altered mental status    Allergies  codeine (Anaphylaxis)        ANTIMICROBIALS:  meropenem  IVPB 500 every 12 hours  nystatin    Suspension 547269 four times a day  valGANciclovir 450 <User Schedule>  vancomycin    Solution 125 every 12 hours      OTHER MEDS:  MEDICATIONS  (STANDING):  aspirin  chewable 81 daily  epoetin barry-epbx (RETACRIT) Injectable 18189 every 7 days  everolimus (ZORTRESS) 3 <User Schedule>  famotidine    Tablet 40 daily  heparin   Injectable 5000 every 12 hours  insulin glargine Injectable (LANTUS) 18 at bedtime  insulin lispro (ADMELOG) corrective regimen sliding scale  at bedtime  insulin lispro (ADMELOG) corrective regimen sliding scale  three times a day before meals  insulin lispro Injectable (ADMELOG) 10 three times a day before meals  NIFEdipine XL 30 daily  tamsulosin 0.4 at bedtime      Vital Signs Last 24 Hrs  T(C): 36.9 (29 Dec 2020 17:00), Max: 37.2 (29 Dec 2020 01:00)  T(F): 98.5 (29 Dec 2020 17:00), Max: 99 (29 Dec 2020 01:00)  HR: 83 (29 Dec 2020 17:00) (75 - 88)  BP: 162/78 (29 Dec 2020 17:00) (133/65 - 162/78)  BP(mean): 111 (29 Dec 2020 17:00) (111 - 111)  RR: 18 (29 Dec 2020 17:00) (18 - 18)  SpO2: 100% (29 Dec 2020 17:00) (97% - 100%)    PHYSICAL EXAMINATION:  General: Alert and Awake, NAD  HEENT: PERRL, EOMI  Neck: Supple  Cardiac: RRR, No M/R/G  Resp: CTAB, No Wh/Rh/Ra  Abdomen: NBS, NT/ND, No HSM, No rigidity or guarding  MSK: RLE foot with overlying dressing. No LE edema. No Calf tenderness  : No bob  Skin: No rashes or lesions. Skin is warm and dry to the touch.   Neuro: Alert and Awake. CN 2-12 Grossly intact. Moves all four extremities spontaneously.  Psych: Calm, Pleasant, Cooperative  Vasc: RIJ catheter (No surrounding erythema, drainage or tenderness to palpation)                          7.1    3.74  )-----------( 167      ( 29 Dec 2020 06:33 )             22.8           141  |  107  |  63<H>  ----------------------------<  155<H>  5.0   |  21<L>  |  3.27<H>    Ca    7.5<L>      29 Dec 2020 06:32  Phos  4.8       Mg     1.7         TPro  5.0<L>  /  Alb  2.7<L>  /  TBili  0.1<L>  /  DBili  x   /  AST  24  /  ALT  28  /  AlkPhos  117        Urinalysis Basic - ( 28 Dec 2020 11:31 )    Color: Light Yellow / Appearance: Clear / S.013 / pH: x  Gluc: x / Ketone: Negative  / Bili: Negative / Urobili: Negative   Blood: x / Protein: 30 mg/dL / Nitrite: Negative   Leuk Esterase: Large / RBC: 3 /hpf /  /HPF   Sq Epi: x / Non Sq Epi: 0 /hpf / Bacteria: Negative        MICROBIOLOGY:  v  .Blood  20   Babesia microti PCR  Results: NOT detected  ***************Result Note*************  The detection of Babesia microti by PCR has only been  validated for whole blood; this test has not been approved  by the US Food and Drug Administration (FDA). Performance  characteristics of this assay have been determined by  [facility]. The clinical significance  of results should be considered in conjunction with the  overall clinical presentation of the patient. Result is not  intended to be used as the sole means for clinical diagnosis  or patient management decisions.  One negative sample does not necessarily rule  out the presence of a parasitic infection.  --  --      .Blood Blood-Peripheral  20   No Growth Final  --  --      .Urine Catheterized  20   No growth  --  --      .Catheter PICC line tip  12-15-20   < 15 colonies Coag Negative Staphylococcus  --  --      .Urine Clean Catch (Midstream)  12-15-20   <10,000 CFU/mL Normal Urogenital Shantel  --  --      .Blood PICC/PERC Single Lumen  20   No Growth Final  --  --                RADIOLOGY:    <The imaging below has been reviewed and visualized by me independently. Findings as detailed in report below>    EXAM:  CT ABDOMEN AND PELVIS                        EXAM:  CT CHEST                        PROCEDURE DATE:  2020    1. Third spacing, as characterized by pleural effusions, anasarca, ascites.  2. Stable 5 mm stone in the distal left ureter without hydroureteronephrosis.  3. Scattered 1 to 2 mm nodules, new from 2020. Unless the patient is at high risk for malignancy, these do not merit follow-up.

## 2020-12-29 NOTE — PROGRESS NOTE BEHAVIORAL HEALTH - NSBHCHARTREVIEWLAB_PSY_A_CORE FT
CBC Full  -  ( 29 Dec 2020 06:33 )  WBC Count : 3.74 K/uL  RBC Count : 2.49 M/uL  Hemoglobin : 7.1 g/dL  Hematocrit : 22.8 %  Platelet Count - Automated : 167 K/uL  Mean Cell Volume : 91.6 fl  Mean Cell Hemoglobin : 28.5 pg  Mean Cell Hemoglobin Concentration : 31.1 gm/dL  Auto Neutrophil # : 2.76 K/uL  Auto Lymphocyte # : 0.58 K/uL  Auto Monocyte # : 0.31 K/uL  Auto Eosinophil # : 0.04 K/uL  Auto Basophil # : 0.01 K/uL  Auto Neutrophil % : 73.7 %  Auto Lymphocyte % : 15.5 %  Auto Monocyte % : 8.3 %  Auto Eosinophil % : 1.1 %  Auto Basophil % : 0.3 %        141  |  107  |  63<H>  ----------------------------<  155<H>  5.0   |  21<L>  |  3.27<H>    Ca    7.5<L>      29 Dec 2020 06:32  Phos  4.8       Mg     1.7         TPro  5.0<L>  /  Alb  2.7<L>  /  TBili  0.1<L>  /  DBili  x   /  AST  24  /  ALT  28  /  AlkPhos  117      LIVER FUNCTIONS - ( 29 Dec 2020 06:32 )  Alb: 2.7 g/dL / Pro: 5.0 g/dL / ALK PHOS: 117 U/L / ALT: 28 U/L / AST: 24 U/L / GGT: x           Urinalysis Basic - ( 28 Dec 2020 11:31 )    Color: Light Yellow / Appearance: Clear / S.013 / pH: x  Gluc: x / Ketone: Negative  / Bili: Negative / Urobili: Negative   Blood: x / Protein: 30 mg/dL / Nitrite: Negative   Leuk Esterase: Large / RBC: 3 /hpf /  /HPF   Sq Epi: x / Non Sq Epi: 0 /hpf / Bacteria: Negative

## 2020-12-29 NOTE — PROGRESS NOTE ADULT - ASSESSMENT
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, CNI Neurotoxicity), IDDM, hyperlipidemia, obesity, HFpEF with mild LV diastolic dysfunction, MGUS who presented with fevers, diarrhea and abnormal labwork    Of note, recent admission for R Heel Osteomyelitis   s/p right foot heel incision and drainage w/ application of stravix and bone biopsy on 11/24  Superficial cultures with Pseudomonas and Operative Cultures with Coag Neg Staph in fluid media  Discharged on IV Cefepime with plan for 6 week course    On 12/14 labwork was noted to have MISA and Transaminitis  Of note [facility] outpatient labs from 12/9 with Cr of 1.41 and LFT's WNL    U/A (12/15, 12/17, 12/20), with pyuria  UCx (12/15, 12/17) with NGTD  COVID19 PCR (12/14) Negative  RVP / COVID19 PCR (12/17, 12/20) Negative  C diff toxin assay (12/15) indeterminate with Positive C diff PCR  CXR (12/15, 12/21) without pulmonary infiltrates  CT A/P (12/15) with distal left ureteral calculus without hydronephrosis and Diffuse wall thickening, slightly asymmetric on the left but without prostate abscess.   Renal US (12/16) with left collecting system fullness but no hydronephrosis    CT Chest (12/23) with small pulmonary nodules - doubt this is the cause of his fever  CT A/P (12/23) with persistent 5 mm left ureteral stone without hydronephrosis    No obvious etiology for fever/infection on the CT imaging unless the ureteral stone is source however, absence of hydronephrosis argues against this.     Discussed case with Dr Chris on 12/28. Patient will complete >5 weeks of therapy from heel resection as of tomorrow (12/30). Was on anti-pseudomonal drug prior to the debridement. No PO options for the pseudomonas isolated from his abscess culture (surgical cultures at the time with CoNS which was likely procurement contaminant). Patient would be high risk for OPAT given: unstable social situation at home, Labile CrCl and ?AIN from cefepime (and thus possible that this may occur on meropenem). Patient is not willing to stay past tomorrow as inpatient despite discussing risks with him and risks of recurrence of infection at his heel (which can ultimately lead to amputation). Patient agreed to at least remain through tomorrow to receive doses of meropenem.    Overall, C diff infection, Fever, Transaminitis, MISA, Abnormal Urinalysis, Nephrolithiasis, Heel Osteomyelitis, Liver Transplant Recipient    # Fever / Transaminitis  - Continue Meropenem to 500 mg IV q12 hours   - resolving    # C. diff Infection / Diarrhea  - Recommend Vancomycin 125 mg PO Q12H ( End Date: 2/2/20)    # RLE Heel OM  - Continue Meropenem 500 mg IV q12 hours    # Acute kidney injury (improving) / Abnormal Urinalysis / Nephrolithiasis   - Continue Meropenem 500 mg IV q12 hours; now off of Cefepime given ?AIN  - Management of MISA per transplant nephrology    #Liver Transplant Recipient  - Continue Valcyte 450mg PO TIW  - Bactrim on hold due to MISA - can consider adding Mepron    I will continue to follow. Please feel free to contact me with any further questions.    Eusebio Pérez M.D.  [facility] Division of Infectious Disease  8AM-5PM: Pager Number [phone]  After Hours (or if no response): Please contact the Infectious Diseases Office at [phone]     The above assessment and plan were discussed with transplant surgery team

## 2020-12-29 NOTE — PROGRESS NOTE ADULT - ASSESSMENT
65M with PMHx of IDDM, HLD, obesity, HFpEF with mild LV diastolic dysfunction, MGUS, chronic anemia with a history of duodenal ulcer 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  Underwent OLT on 7/2/2020, post op coure c/b VRE bacteremia tx with linezolid and delirium likely in setting of CNI toxicity (FK discontinued/Everolimus initiated 7/27).    Recent admission from 11/20-12/2 for OM of the R heel, s/p debridement with Podiatry, and was discharged with Cefepime x 6 weeks via PICC line (placed 12/1).     Initial wound cx on admission grew Pseudomonas, OR cx grew staph epi.  He now presents with from clinic rising transaminitis and MISA (SCr 3.3 from 1.5)    Hospital course c/b C. Diff colitis and worsening renal function, requiring HD    [] ID:   - Afebrile, last fever 12/22.  bld/urine cxs negative   - C. Diff colitis: completed tx with vanco PO, dose reduced to bid while on meropenem   - R heel OM: s/p debridement by podiatry last admission; wound healing, no signs on infection, continue meropenem until 1/5   - CMV +/- :  Valcyte 450mg TIW (renally adjusted), CMV PCR neg from 12/15. will send repeat weekly,   - R ear otitis externa with chronic hearing loss: Completed Ofloxacin per ENT  - TTE w/o obvious evidence of endocarditis     [] Non-oliguric MISA   - required HD (12/19 and 12/22)   - Renal function improving   - Anemia:  Procrit 10K sq weekly    [] s/p OLT with transaminitis  - Everolimus 2/2, MMF on hold since last admission in setting of OM; Decr Pred 5mg daily  - PPx: Valcyte renally dosed, off Bactrim  - Liver doppler with patent vessels   - LFTs stable    [] R LE edema  - r/o DVT; LE doppler negative    [] DM  - diabetic diet  - Lantus 18u qhs, Lispro 10u tid

## 2020-12-29 NOTE — PROGRESS NOTE BEHAVIORAL HEALTH - CASE SUMMARY
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  Underwent OLT on 7/2/2020, post op course c/b VRE bacteremia tx with linezolid and delirium likely in setting of CNI toxicity (FK discontinued/Everolimus initiated 7/27). Previous admission in 11/20-12/2 for OM of the R heel, s/p debridement with Podiatry, had PICC line (placed 12/1). Pt currently is being treated for OM and MISA. Consult for depression.  Patient seen and evaluated, awake and alert oriented x3     Pt was calm cooperative, He relates depression due to  prolonged hospital stay and chronic marital issues. Despite this pt is goal oriented , denies current SI/HI.  Pt's wife denies pt is an acute risk denies red flag signs for suicide. She denies pt expressing suicidal ideations or plan to her. Currently denies A/V Hallucinations though he did have some AH one week ago which have resolved.  Pt may benefit from Zoloft but should not take if he is on Linezolid again and if he agrees to starting an antidepressant.

## 2020-12-29 NOTE — PROGRESS NOTE ADULT - SUBJECTIVE AND OBJECTIVE BOX
[facility]  Ramón Morales PA-C  [address] [place] [address]  [phone]      INTERVAL HPI/OVERNIGHT EVENTS:    patient seen and examined  overnight events noted       MEDICATIONS  (STANDING):  aspirin  chewable 81 milliGRAM(s) Oral daily  chlorhexidine 2% Cloths 1 Application(s) Topical daily  epoetin barry-epbx (RETACRIT) Injectable 27973 Unit(s) SubCutaneous every 7 days  everolimus (ZORTRESS) 1.5 milliGRAM(s) Oral <User Schedule>  heparin   Injectable 5000 Unit(s) SubCutaneous every 12 hours  insulin glargine Injectable (LANTUS) 10 Unit(s) SubCutaneous at bedtime  insulin lispro (ADMELOG) corrective regimen sliding scale   SubCutaneous three times a day before meals  insulin lispro (ADMELOG) corrective regimen sliding scale   SubCutaneous at bedtime  insulin lispro Injectable (ADMELOG) 5 Unit(s) SubCutaneous three times a day before meals  insulin lispro Injectable (ADMELOG) 2 Unit(s) SubCutaneous once  magnesium oxide 400 milliGRAM(s) Oral daily  meropenem  IVPB 500 milliGRAM(s) IV Intermittent every 24 hours  nystatin    Suspension 363986 Unit(s) Oral four times a day  ofloxacin 0.3% Solution 10 Drop(s) Right Ear two times a day  pantoprazole    Tablet 40 milliGRAM(s) Oral before breakfast  predniSONE   Tablet 5 milliGRAM(s) Oral two times a day  sodium bicarbonate 1300 milliGRAM(s) Oral three times a day  sodium bicarbonate  Infusion 0.05 mEq/kG/Hr (75 mL/Hr) IV Continuous <Continuous>  tamsulosin 0.4 milliGRAM(s) Oral at bedtime  valGANciclovir 450 milliGRAM(s) Oral <User Schedule>  vancomycin    Solution 125 milliGRAM(s) Oral every 6 hours    MEDICATIONS  (PRN):      Allergies    codeine (Anaphylaxis)    Intolerances        ROS:   General:  No wt loss, No fevers, chills, night sweats, + fatigue,   Eyes:  Good vision, no reported pain  ENT:  No sore throat, pain, runny nose, dysphagia  CV:  No pain, palpitations, hypo/hypertension  Resp:  No dyspnea, cough, tachypnea, wheezing  GI:  No pain, No nausea, No vomiting, No diarrhea, No constipation, No weight loss, No fever, No pruritis, No rectal bleeding, No tarry stools, No dysphagia,  :  No pain, bleeding, incontinence, nocturia  Muscle:  No pain, weakness  Neuro:  No weakness, tingling, memory problems  Psych:  No fatigue, insomnia, mood problems, depression  Endocrine:  No polyuria, polydipsia, cold/heat intolerance  Heme:  No petechiae, ecchymosis, easy bruisability  Skin:  No rash, tattoos, scars, edema      PHYSICAL EXAM:   Vital Signs:  Vital Signs Last 24 Hrs  T(C): 36.5 (19 Dec 2020 15:00), Max: 37.2 (19 Dec 2020 04:00)  T(F): 97.7 (19 Dec 2020 15:00), Max: 99 (19 Dec 2020 04:00)  HR: 75 (19 Dec 2020 15:00) (75 - 91)  BP: 148/68 (19 Dec 2020 15:00) (118/81 - 163/70)  BP(mean): 95 (19 Dec 2020 15:00) (90 - 106)  RR: 20 (19 Dec 2020 15:00) (16 - 20)  SpO2: 96% (19 Dec 2020 15:00) (92% - 98%)  Daily     Daily Weight in k.8 (18 Dec 2020 20:35)    GENERAL:  no distress  HEENT:  NC/AT  ABDOMEN:  Soft, non-tender, non-distended, normoactive bowel sounds  EXTEREMITIES:  +RLE edema   SKIN:  No rash/erythema/ecchymoses/petechiae/wounds/abscess/warm/dry  NEURO:  awake, alert      LABS:                        7.4    4.50  )-----------( 179      ( 19 Dec 2020 05:29 )             22.9     12-19    133<L>  |  101  |  92<H>  ----------------------------<  224<H>  4.7   |  16<L>  |  6.94<H>    Ca    8.2<L>      19 Dec 2020 05:29  Phos  4.6     12-  Mg     2.2     12-    TPro  5.8<L>  /  Alb  2.6<L>  /  TBili  0.1<L>  /  DBili  x   /  AST  31  /  ALT  47<H>  /  AlkPhos  222<H>  12-19    PT/INR - ( 19 Dec 2020 05:29 )   PT: 13.1 sec;   INR: 1.10 ratio         PTT - ( 19 Dec 2020 05:29 )  PTT:26.1 sec  Urinalysis Basic - ( 17 Dec 2020 16:54 )    Color: Light Yellow / Appearance: Slightly Turbid / S.012 / pH: x  Gluc: x / Ketone: Negative  / Bili: Negative / Urobili: Negative   Blood: x / Protein: 100 mg/dL / Nitrite: Negative   Leuk Esterase: Large / RBC: 3 /hpf /  /HPF   Sq Epi: x / Non Sq Epi: 0 /hpf / Bacteria: Negative        RADIOLOGY & ADDITIONAL TESTS:

## 2020-12-29 NOTE — DISCHARGE NOTE NURSING/CASE MANAGEMENT/SOCIAL WORK - NSDCFUADDAPPT_GEN_ALL_CORE_FT
FOLLOW-UP:  1. Please call to make a follow-up appointment with Dr. Gray ( date  )  2. Please follow up with your primary care physician in one week regarding your hospitalization.

## 2020-12-29 NOTE — PROGRESS NOTE BEHAVIORAL HEALTH - AXIS III
small esophageal varices (12/2019), portal hypertensive gastropathy, ascites, hx SBP, hx hepatic encephalopathy, GAVE syndrome s/p APC, hx duodenal ulcer (5/2019), HTN, HLD, DM, HFpEF (EF 70%), recent ESBL E coli prostate abscess (3/2020 s/p 4 weeks ertapenem), orthostatic hypotension ( on midodrine), OM

## 2020-12-30 ENCOUNTER — NON-APPOINTMENT (OUTPATIENT)
Age: 65
End: 2020-12-30

## 2020-12-30 VITALS
OXYGEN SATURATION: 98 % | HEART RATE: 86 BPM | SYSTOLIC BLOOD PRESSURE: 128 MMHG | RESPIRATION RATE: 18 BRPM | DIASTOLIC BLOOD PRESSURE: 62 MMHG | TEMPERATURE: 98 F

## 2020-12-30 LAB
ALBUMIN SERPL ELPH-MCNC: 2.9 G/DL — LOW (ref 3.3–5)
ALP SERPL-CCNC: 129 U/L — HIGH (ref 40–120)
ALT FLD-CCNC: 32 U/L — SIGNIFICANT CHANGE UP (ref 10–45)
ANION GAP SERPL CALC-SCNC: 13 MMOL/L — SIGNIFICANT CHANGE UP (ref 5–17)
APTT BLD: 29.3 SEC — SIGNIFICANT CHANGE UP (ref 27.5–35.5)
AST SERPL-CCNC: 23 U/L — SIGNIFICANT CHANGE UP (ref 10–40)
BASOPHILS # BLD AUTO: 0.03 K/UL — SIGNIFICANT CHANGE UP (ref 0–0.2)
BASOPHILS NFR BLD AUTO: 0.7 % — SIGNIFICANT CHANGE UP (ref 0–2)
BILIRUB SERPL-MCNC: 0.1 MG/DL — LOW (ref 0.2–1.2)
BUN SERPL-MCNC: 70 MG/DL — HIGH (ref 7–23)
CALCIUM SERPL-MCNC: 8.5 MG/DL — SIGNIFICANT CHANGE UP (ref 8.4–10.5)
CHLORIDE SERPL-SCNC: 104 MMOL/L — SIGNIFICANT CHANGE UP (ref 96–108)
CO2 SERPL-SCNC: 23 MMOL/L — SIGNIFICANT CHANGE UP (ref 22–31)
CREAT SERPL-MCNC: 3.61 MG/DL — HIGH (ref 0.5–1.3)
EBV DNA SERPL NAA+PROBE-ACNC: SIGNIFICANT CHANGE UP IU/ML
EOSINOPHIL # BLD AUTO: 0.07 K/UL — SIGNIFICANT CHANGE UP (ref 0–0.5)
EOSINOPHIL NFR BLD AUTO: 1.6 % — SIGNIFICANT CHANGE UP (ref 0–6)
EVEROLIMUS, WHOLE BLOOD RESULT: 2.3 NG/ML — LOW (ref 3–8)
GLUCOSE BLDC GLUCOMTR-MCNC: 170 MG/DL — HIGH (ref 70–99)
GLUCOSE SERPL-MCNC: 198 MG/DL — HIGH (ref 70–99)
HCT VFR BLD CALC: 25.2 % — LOW (ref 39–50)
HERPES-6 (HSV-6) PCR: SIGNIFICANT CHANGE UP COPIES/ML
HGB BLD-MCNC: 7.8 G/DL — LOW (ref 13–17)
IMM GRANULOCYTES NFR BLD AUTO: 1.2 % — SIGNIFICANT CHANGE UP (ref 0–1.5)
INR BLD: 1.08 RATIO — SIGNIFICANT CHANGE UP (ref 0.88–1.16)
LYMPHOCYTES # BLD AUTO: 0.89 K/UL — LOW (ref 1–3.3)
LYMPHOCYTES # BLD AUTO: 20.6 % — SIGNIFICANT CHANGE UP (ref 13–44)
MAGNESIUM SERPL-MCNC: 1.8 MG/DL — SIGNIFICANT CHANGE UP (ref 1.6–2.6)
MCHC RBC-ENTMCNC: 28.8 PG — SIGNIFICANT CHANGE UP (ref 27–34)
MCHC RBC-ENTMCNC: 31 GM/DL — LOW (ref 32–36)
MCV RBC AUTO: 93 FL — SIGNIFICANT CHANGE UP (ref 80–100)
MONOCYTES # BLD AUTO: 0.43 K/UL — SIGNIFICANT CHANGE UP (ref 0–0.9)
MONOCYTES NFR BLD AUTO: 9.9 % — SIGNIFICANT CHANGE UP (ref 2–14)
NEUTROPHILS # BLD AUTO: 2.86 K/UL — SIGNIFICANT CHANGE UP (ref 1.8–7.4)
NEUTROPHILS NFR BLD AUTO: 66 % — SIGNIFICANT CHANGE UP (ref 43–77)
NRBC # BLD: 0 /100 WBCS — SIGNIFICANT CHANGE UP (ref 0–0)
PHOSPHATE SERPL-MCNC: 5.7 MG/DL — HIGH (ref 2.5–4.5)
PLATELET # BLD AUTO: 169 K/UL — SIGNIFICANT CHANGE UP (ref 150–400)
POTASSIUM SERPL-MCNC: 5.4 MMOL/L — HIGH (ref 3.5–5.3)
POTASSIUM SERPL-SCNC: 5.4 MMOL/L — HIGH (ref 3.5–5.3)
PROT SERPL-MCNC: 5.7 G/DL — LOW (ref 6–8.3)
PROTHROM AB SERPL-ACNC: 12.9 SEC — SIGNIFICANT CHANGE UP (ref 10.6–13.6)
RBC # BLD: 2.71 M/UL — LOW (ref 4.2–5.8)
RBC # FLD: 13.8 % — SIGNIFICANT CHANGE UP (ref 10.3–14.5)
SODIUM SERPL-SCNC: 140 MMOL/L — SIGNIFICANT CHANGE UP (ref 135–145)
WBC # BLD: 4.33 K/UL — SIGNIFICANT CHANGE UP (ref 3.8–10.5)
WBC # FLD AUTO: 4.33 K/UL — SIGNIFICANT CHANGE UP (ref 3.8–10.5)

## 2020-12-30 PROCEDURE — 99232 SBSQ HOSP IP/OBS MODERATE 35: CPT | Mod: GC

## 2020-12-30 PROCEDURE — 99232 SBSQ HOSP IP/OBS MODERATE 35: CPT

## 2020-12-30 RX ORDER — EVEROLIMUS 10 MG/1
4 TABLET ORAL
Qty: 0 | Refills: 0 | DISCHARGE
Start: 2020-12-30

## 2020-12-30 RX ORDER — ALBUMIN HUMAN 25 %
100 VIAL (ML) INTRAVENOUS ONCE
Refills: 0 | Status: COMPLETED | OUTPATIENT
Start: 2020-12-30 | End: 2020-12-30

## 2020-12-30 RX ORDER — VALGANCICLOVIR 450 MG/1
450 TABLET, FILM COATED ORAL
Qty: 0 | Refills: 0 | DISCHARGE
Start: 2020-12-30

## 2020-12-30 RX ORDER — INSULIN GLARGINE 100 [IU]/ML
18 INJECTION, SOLUTION SUBCUTANEOUS
Qty: 0 | Refills: 0 | DISCHARGE
Start: 2020-12-30

## 2020-12-30 RX ORDER — NIFEDIPINE 30 MG
1 TABLET, EXTENDED RELEASE 24 HR ORAL
Qty: 30 | Refills: 0
Start: 2020-12-30 | End: 2021-01-28

## 2020-12-30 RX ORDER — SODIUM ZIRCONIUM CYCLOSILICATE 10 G/10G
10 POWDER, FOR SUSPENSION ORAL ONCE
Refills: 0 | Status: COMPLETED | OUTPATIENT
Start: 2020-12-30 | End: 2020-12-30

## 2020-12-30 RX ORDER — VANCOMYCIN HCL 1 G
2.5 VIAL (EA) INTRAVENOUS
Qty: 15 | Refills: 0
Start: 2020-12-30 | End: 2021-01-01

## 2020-12-30 RX ORDER — MAGNESIUM SULFATE 500 MG/ML
2 VIAL (ML) INJECTION ONCE
Refills: 0 | Status: COMPLETED | OUTPATIENT
Start: 2020-12-30 | End: 2020-12-30

## 2020-12-30 RX ORDER — EVEROLIMUS 10 MG/1
3 TABLET ORAL
Qty: 0 | Refills: 0 | DISCHARGE
Start: 2020-12-30

## 2020-12-30 RX ADMIN — HEPARIN SODIUM 5000 UNIT(S): 5000 INJECTION INTRAVENOUS; SUBCUTANEOUS at 05:20

## 2020-12-30 RX ADMIN — MAGNESIUM OXIDE 400 MG ORAL TABLET 400 MILLIGRAM(S): 241.3 TABLET ORAL at 11:32

## 2020-12-30 RX ADMIN — Medication 500000 UNIT(S): at 05:20

## 2020-12-30 RX ADMIN — Medication 50 GRAM(S): at 07:38

## 2020-12-30 RX ADMIN — SODIUM ZIRCONIUM CYCLOSILICATE 10 GRAM(S): 10 POWDER, FOR SUSPENSION ORAL at 09:24

## 2020-12-30 RX ADMIN — Medication 10 UNIT(S): at 08:06

## 2020-12-30 RX ADMIN — MEROPENEM 100 MILLIGRAM(S): 1 INJECTION INTRAVENOUS at 05:19

## 2020-12-30 RX ADMIN — EVEROLIMUS 3 MILLIGRAM(S): 10 TABLET ORAL at 07:38

## 2020-12-30 RX ADMIN — Medication 5 MILLIGRAM(S): at 05:19

## 2020-12-30 RX ADMIN — FAMOTIDINE 40 MILLIGRAM(S): 10 INJECTION INTRAVENOUS at 11:32

## 2020-12-30 RX ADMIN — Medication 125 MILLIGRAM(S): at 05:20

## 2020-12-30 RX ADMIN — Medication 30 MILLIGRAM(S): at 05:19

## 2020-12-30 RX ADMIN — Medication 50 MILLILITER(S): at 10:34

## 2020-12-30 RX ADMIN — Medication 50 MILLILITER(S): at 09:23

## 2020-12-30 RX ADMIN — Medication 81 MILLIGRAM(S): at 11:32

## 2020-12-30 RX ADMIN — Medication 2: at 08:06

## 2020-12-30 NOTE — PROGRESS NOTE ADULT - SUBJECTIVE AND OBJECTIVE BOX
Chief Complaint:  Patient is a 65y old  Male who presents with a chief complaint of transaminitis (29 Dec 2020 17:26)      Interval Events:     Allergies:  codeine (Anaphylaxis)      Hospital Medications:  aspirin  chewable 81 milliGRAM(s) Oral daily  chlorhexidine 2% Cloths 1 Application(s) Topical <User Schedule>  epoetin barry-epbx (RETACRIT) Injectable 61257 Unit(s) IV Push every 7 days  everolimus (ZORTRESS) 3 milliGRAM(s) Oral <User Schedule>  famotidine    Tablet 40 milliGRAM(s) Oral daily  heparin   Injectable 5000 Unit(s) SubCutaneous every 12 hours  insulin glargine Injectable (LANTUS) 18 Unit(s) SubCutaneous at bedtime  insulin lispro (ADMELOG) corrective regimen sliding scale   SubCutaneous three times a day before meals  insulin lispro (ADMELOG) corrective regimen sliding scale   SubCutaneous at bedtime  insulin lispro Injectable (ADMELOG) 10 Unit(s) SubCutaneous three times a day before meals  magnesium oxide 400 milliGRAM(s) Oral daily  NIFEdipine XL 30 milliGRAM(s) Oral daily  nystatin    Suspension 751177 Unit(s) Oral four times a day  predniSONE   Tablet 5 milliGRAM(s) Oral daily  tamsulosin 0.4 milliGRAM(s) Oral at bedtime  valGANciclovir 450 milliGRAM(s) Oral <User Schedule>  vancomycin    Solution 125 milliGRAM(s) Oral every 12 hours      PMHX/PSHX:  GIB (gastrointestinal bleeding)    GERD with esophagitis    Hepatic encephalopathy    Obesity    Fatty liver disease, nonalcoholic    Renal stones    Hypertension    Neuropathy    Hypercholesteremia    Diabetes    S/P cholecystectomy    No significant past surgical history        Family history:  Family history of type 2 diabetes mellitus    Family history of hypertension    Family history of stomach cancer    No pertinent family history in first degree relatives        ROS: As per HPI, 14-point ROS negative otherwise.    General:  No wt loss, fevers, chills, night sweats, fatigue,   Eyes:  Good vision, no reported pain  ENT:  No sore throat, pain, runny nose, dysphagia  CV:  No pain, palpitations, hypo/hypertension  Resp:  No dyspnea, cough, tachypnea, wheezing  GI:  See HPI  :  No pain, bleeding, incontinence, nocturia  Muscle:  No pain, weakness  Neuro:  No weakness, tingling, memory problems  Psych:  No fatigue, insomnia, mood problems, depression  Endocrine:  No polyuria, polydipsia, cold/heat intolerance  Heme:  No petechiae, ecchymosis, easy bruisability  Skin:  No rash, edema      PHYSICAL EXAM:     Vital Signs:  Vital Signs Last 24 Hrs  T(C): 36.9 (30 Dec 2020 05:16), Max: 36.9 (29 Dec 2020 17:00)  T(F): 98.5 (30 Dec 2020 05:16), Max: 98.5 (29 Dec 2020 17:00)  HR: 82 (30 Dec 2020 05:16) (78 - 89)  BP: 135/69 (30 Dec 2020 05:16) (119/55 - 162/78)  BP(mean): 94 (29 Dec 2020 21:03) (94 - 111)  RR: 18 (30 Dec 2020 05:16) (18 - 18)  SpO2: 98% (30 Dec 2020 05:16) (97% - 100%)  Daily     Daily     GENERAL:  appears comfortable, no acute distress  HEENT:  NC/AT,  conjunctivae clear, sclera -anicteric  CHEST:  no increased effort  HEART:  Regular rate and rhythm  ABDOMEN:  Soft, non-tender, non-distended,  no masses ,no hepato-splenomegaly,   EXTREMITIES:  no cyanosis, clubbing or edema  SKIN:  No rash/erythema/ecchymoses/petechiae/wounds  NEURO:  Alert, oriented    LABS:                        7.8    4.33  )-----------( 169      ( 30 Dec 2020 06:01 )             25.2     12-30    140  |  104  |  70<H>  ----------------------------<  198<H>  5.4<H>   |  23  |  3.61<H>    Ca    8.5      30 Dec 2020 06:01  Phos  5.7     12-30  Mg     1.8     12-30    TPro  5.7<L>  /  Alb  2.9<L>  /  TBili  0.1<L>  /  DBili  x   /  AST  23  /  ALT  32  /  AlkPhos  129<H>  12-30    LIVER FUNCTIONS - ( 30 Dec 2020 06:01 )  Alb: 2.9 g/dL / Pro: 5.7 g/dL / ALK PHOS: 129 U/L / ALT: 32 U/L / AST: 23 U/L / GGT: x           PT/INR - ( 30 Dec 2020 06:01 )   PT: 12.9 sec;   INR: 1.08 ratio         PTT - ( 30 Dec 2020 06:01 )  PTT:29.3 sec  Urinalysis Basic - ( 28 Dec 2020 11:31 )    Color: Light Yellow / Appearance: Clear / S.013 / pH: x  Gluc: x / Ketone: Negative  / Bili: Negative / Urobili: Negative   Blood: x / Protein: 30 mg/dL / Nitrite: Negative   Leuk Esterase: Large / RBC: 3 /hpf /  /HPF   Sq Epi: x / Non Sq Epi: 0 /hpf / Bacteria: Negative          Imaging:             Chief Complaint:  Patient is a 65y old  Male who presents with a chief complaint of transaminitis (29 Dec 2020 17:26)      Interval Events: No overnight events. No difficulty voiding. Eager to be discharged.    Allergies:  codeine (Anaphylaxis)      Hospital Medications:  aspirin  chewable 81 milliGRAM(s) Oral daily  chlorhexidine 2% Cloths 1 Application(s) Topical <User Schedule>  epoetin barry-epbx (RETACRIT) Injectable 08505 Unit(s) IV Push every 7 days  everolimus (ZORTRESS) 3 milliGRAM(s) Oral <User Schedule>  famotidine    Tablet 40 milliGRAM(s) Oral daily  heparin   Injectable 5000 Unit(s) SubCutaneous every 12 hours  insulin glargine Injectable (LANTUS) 18 Unit(s) SubCutaneous at bedtime  insulin lispro (ADMELOG) corrective regimen sliding scale   SubCutaneous three times a day before meals  insulin lispro (ADMELOG) corrective regimen sliding scale   SubCutaneous at bedtime  insulin lispro Injectable (ADMELOG) 10 Unit(s) SubCutaneous three times a day before meals  magnesium oxide 400 milliGRAM(s) Oral daily  NIFEdipine XL 30 milliGRAM(s) Oral daily  nystatin    Suspension 357640 Unit(s) Oral four times a day  predniSONE   Tablet 5 milliGRAM(s) Oral daily  tamsulosin 0.4 milliGRAM(s) Oral at bedtime  valGANciclovir 450 milliGRAM(s) Oral <User Schedule>  vancomycin    Solution 125 milliGRAM(s) Oral every 12 hours      PMHX/PSHX:  GIB (gastrointestinal bleeding)    GERD with esophagitis    Hepatic encephalopathy    Obesity    Fatty liver disease, nonalcoholic    Renal stones    Hypertension    Neuropathy    Hypercholesteremia    Diabetes    S/P cholecystectomy    No significant past surgical history        Family history:  Family history of type 2 diabetes mellitus    Family history of hypertension    Family history of stomach cancer    No pertinent family history in first degree relatives        ROS: As per HPI, 14-point ROS negative otherwise.    General:  No wt loss, fevers, chills, night sweats, fatigue,   Eyes:  Good vision, no reported pain  ENT:  No sore throat, pain, runny nose, dysphagia  CV:  No pain, palpitations, hypo/hypertension  Resp:  No dyspnea, cough, tachypnea, wheezing  GI:  See HPI  :  No pain, bleeding, incontinence, nocturia  Muscle:  No pain, weakness  Neuro:  No weakness, tingling, memory problems  Psych:  No fatigue, insomnia, mood problems, depression  Endocrine:  No polyuria, polydipsia, cold/heat intolerance  Heme:  No petechiae, ecchymosis, easy bruisability  Skin:  No rash, edema      PHYSICAL EXAM:     Vital Signs:  Vital Signs Last 24 Hrs  T(C): 36.9 (30 Dec 2020 05:16), Max: 36.9 (29 Dec 2020 17:00)  T(F): 98.5 (30 Dec 2020 05:16), Max: 98.5 (29 Dec 2020 17:00)  HR: 82 (30 Dec 2020 05:16) (78 - 89)  BP: 135/69 (30 Dec 2020 05:16) (119/55 - 162/78)  BP(mean): 94 (29 Dec 2020 21:03) (94 - 111)  RR: 18 (30 Dec 2020 05:16) (18 - 18)  SpO2: 98% (30 Dec 2020 05:16) (97% - 100%)  Daily     Daily     GENERAL:  appears comfortable, no acute distress  HEENT:  NC/AT,  conjunctivae clear, sclera -anicteric  CHEST:  no increased effort  HEART:  Regular rate and rhythm  ABDOMEN:  Soft, non-tender, non-distended,  no masses ,no hepato-splenomegaly,   EXTREMITIES: mild RLE  SKIN:  No rash/erythema/ecchymoses/petechiae/wounds  NEURO:  Alert, oriented    LABS:                        7.8    4.33  )-----------( 169      ( 30 Dec 2020 06:01 )             25.2     12-30    140  |  104  |  70<H>  ----------------------------<  198<H>  5.4<H>   |  23  |  3.61<H>    Ca    8.5      30 Dec 2020 06:01  Phos  5.7     12-30  Mg     1.8     12-30    TPro  5.7<L>  /  Alb  2.9<L>  /  TBili  0.1<L>  /  DBili  x   /  AST  23  /  ALT  32  /  AlkPhos  129<H>  12-30    LIVER FUNCTIONS - ( 30 Dec 2020 06:01 )  Alb: 2.9 g/dL / Pro: 5.7 g/dL / ALK PHOS: 129 U/L / ALT: 32 U/L / AST: 23 U/L / GGT: x           PT/INR - ( 30 Dec 2020 06:01 )   PT: 12.9 sec;   INR: 1.08 ratio         PTT - ( 30 Dec 2020 06:01 )  PTT:29.3 sec  Urinalysis Basic - ( 28 Dec 2020 11:31 )    Color: Light Yellow / Appearance: Clear / S.013 / pH: x  Gluc: x / Ketone: Negative  / Bili: Negative / Urobili: Negative   Blood: x / Protein: 30 mg/dL / Nitrite: Negative   Leuk Esterase: Large / RBC: 3 /hpf /  /HPF   Sq Epi: x / Non Sq Epi: 0 /hpf / Bacteria: Negative          Imaging:

## 2020-12-30 NOTE — PROGRESS NOTE ADULT - REASON FOR ADMISSION
transaminitis
transaminitis, MISA, fever
transaminitis
transaminitis
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
## 2020-12-30 NOTE — PROGRESS NOTE ADULT - COVID-19 NEGATIVE LAB RESULT
ASSESSMENT:     Venous stasis dermatitis.   congestive heart failure.     Plan:   Furosemide. daily until better.    compression stockings during the day. Off at bedtime .  topical steroid for the dermatitis.  low salt diet   
COVID-19 ruled out

## 2020-12-30 NOTE — PROGRESS NOTE ADULT - ASSESSMENT
66yo M with PMHx of IDDM, HLD, obesity, HFpEF, with mild LV diastolic dysfunction, MGUS, chronic anemia with a history of duodenal ulcer as well as GAVE and duodenal AVM s/p APC (last on 10/11/19), decompensated JEAN/Cirrhosis 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, post op course c/b VRE bacteremia tx with linezolid and delirium likely in setting of CNI toxicity (FK discontinued/Everolimus initiated 7/27). Recent admission from 11/20-12/2 for OM of the R heel, s/p debridement with Podiatry, and was discharged with Cefepime x 6 weeks via PICC line (placed 12/1). Initial wound cx on admission grew Pseudomonas, OR cx grew staph epi. Was also found with neutropenia and Valcyte was reduced (was on 900mg daily CMV +/-) Sent to ED from clinic with rising transaminitis and MISA (SCr 3.3 from 1.5). Hospital course c/b C diff colitis and worsening renal function requiring HD.      Impression:   # C. diff: on po vanco since 12/15. Continues to improved from symptom standpoint. No diarrhea and no contact precautiosn needed  # MISA with c/f uremia: Cr. slightly up today possible from dehydration with high urine output. mental status stable. Cr still oveall improved. Nonoliguric. Only required HD x 2 12/19 and 12/22,  Kidney slightly enlarged on u/s with labs and clinical picture consistent with AIN or acute tubular necrosis with interstitial features. FeNa and FeUrea suggest possible AIN (from cefepime) Cr has been gradually improving and has good UOP . Shiley removed.  # Fever: resolved. ( Last fever 12/22) CT scan unrevealing for source. Possible secondary to AIN. Underlying infection (bladder source?) is on differential. Has cdiff which is resolving. Possible osteo though low suspicion from ID and podiatry teams as patient was on targeted antibiotics with clinical appearance of a healing wound.  # Right foot osteomyelitis:  reviewed by podiatry, appears stable. s/p surgical debridement and graft placement on 11/24 by Podiatry.   Wound culture grew pseudomonas and bone bx on 11/24 growing S. epidermidis - likely contaminant. Had PICC line, now removed. Meropenem to be completed now  # Otitis externa: on floxin drops  # S/p liver transplant 7/2/20:     Recipient Info:   ABO: A  CMV:  Neg  EBV Positive  Donor:  Donor ID:  [id number] Match: [id number]  Age: 29 ABO:  A1 High Risk:   No COD: Cardiovascular  Anti CMV positive, EBV IgG- positive  HepBcAb-neg, Hepatitis C-DANA- neg, Hepatitis C ab-neg    Recommendations:  - give albumin today for volume expansion  - check PVR  - monitor BMP and kidney function closely as outpatient  - continue with po vanc to complete course as outpatient  - immunosuppressives with everolimus and prednisone per transplant surgery  - strict glycemic control  - holding bactrim given MISA; restart on outpatient basis  - manage immunosuppression closely as outpatient as multiple infections currently recovering  - transplant hepatology to follow

## 2020-12-30 NOTE — PROGRESS NOTE ADULT - SUBJECTIVE AND OBJECTIVE BOX
Transplant Surgery - Multidisciplinary Rounds  --------------------------------------------------------------  s/p OLT 7/2/2020      Admitted 12/14/2020 with transaminitis, MISA, diarrhea    Present: Patient seen and examined with multidisciplinary team including Transplant Surgeon: Dr. Alonzo, Dr. Gray, Transplant Hepatologist Dr. Medina,  Transplant Nephrologist: Dr. Salvador, Pharmacist: Jessica Nagel. ACPs: Patrick Bingham and unit RN during am rounds.  Disciplines not in attendance will be notified of the plan.     HPI: 65M with PMHx of IDDM, HLD, obesity, HFpEF with mild LV diastolic dysfunction, MGUS, chronic anemia with a history of duodenal ulcer 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  Underwent OLT on 7/2/2020, post op course c/b VRE bacteremia tx with linezolid and delirium likely in setting of CNI toxicity (FK discontinued/Everolimus initiated 7/27).    Recent admission from 11/20-12/2 for OM of the R heel, s/p debridement with Podiatry, and was discharged with Cefepime x 6 weeks via PICC line (placed 12/1). Initial wound cx on admission grew Pseudomonas, OR cx grew staph epi. Was also found with neutropenia and Valcyte was reduced (was on 900mg daily CMV +/-).      Sent to ED from clinic with rising transaminitis and MISA (SCr 3.3 from 1.5). Hospital course c/b C diff colitis and worsening renal function requiring HD x 2 (12/19 and 12/22)    Interval Events:  - afebrile, stable VSS.   - Renal function improving in the last few days with slight increase today, Creat 3.61 from 3.2 yesterday, good u/o 1.75 L  - Mild swelling of RLE, R LE doppler negative for DVT on 12/28  -K 5.4 this morning  -FS improving, last 175, 137, 112  -reports no further episodes of diarrhea    Potential Discharge date: 12/30/20    Education:  Medications    Plan of care:  See Below    MEDICATIONS  (STANDING):  aspirin  chewable 81 milliGRAM(s) Oral daily  chlorhexidine 2% Cloths 1 Application(s) Topical <User Schedule>  epoetin barry-epbx (RETACRIT) Injectable 03261 Unit(s) IV Push every 7 days  epoetin barry-epbx (RETACRIT) Injectable 20834 Unit(s) SubCutaneous once  everolimus (ZORTRESS) 2 milliGRAM(s) Oral <User Schedule>  everolimus (ZORTRESS) 2 milliGRAM(s) Oral <User Schedule>  famotidine    Tablet 40 milliGRAM(s) Oral daily  heparin   Injectable 5000 Unit(s) SubCutaneous every 12 hours  insulin glargine Injectable (LANTUS) 18 Unit(s) SubCutaneous at bedtime  insulin lispro (ADMELOG) corrective regimen sliding scale   SubCutaneous at bedtime  insulin lispro (ADMELOG) corrective regimen sliding scale   SubCutaneous three times a day before meals  insulin lispro Injectable (ADMELOG) 10 Unit(s) SubCutaneous three times a day before meals  magnesium oxide 400 milliGRAM(s) Oral daily  meropenem  IVPB 500 milliGRAM(s) IV Intermittent every 12 hours  NIFEdipine XL 30 milliGRAM(s) Oral daily  nystatin    Suspension 908786 Unit(s) Oral four times a day  tamsulosin 0.4 milliGRAM(s) Oral at bedtime  valGANciclovir 450 milliGRAM(s) Oral <User Schedule>  vancomycin    Solution 125 milliGRAM(s) Oral every 12 hours      PAST MEDICAL & SURGICAL HISTORY:  GIB (gastrointestinal bleeding)  GERD with esophagitis  Gastritis &amp; Non Bleeding Ulcers  Hepatic encephalopathy  Obesity  Fatty liver disease, nonalcoholic  Renal stones 25 years ago  Hypertension  Neuropathy  Hypercholesteremia  Diabetes  S/P cholecystectomy      MEDICATIONS  (STANDING):  albumin human 25% IVPB 100 milliLiter(s) IV Intermittent once  aspirin  chewable 81 milliGRAM(s) Oral daily  chlorhexidine 2% Cloths 1 Application(s) Topical <User Schedule>  epoetin barry-epbx (RETACRIT) Injectable 36886 Unit(s) IV Push every 7 days  everolimus (ZORTRESS) 3 milliGRAM(s) Oral <User Schedule>  famotidine    Tablet 40 milliGRAM(s) Oral daily  heparin   Injectable 5000 Unit(s) SubCutaneous every 12 hours  insulin glargine Injectable (LANTUS) 18 Unit(s) SubCutaneous at bedtime  insulin lispro (ADMELOG) corrective regimen sliding scale   SubCutaneous three times a day before meals  insulin lispro (ADMELOG) corrective regimen sliding scale   SubCutaneous at bedtime  insulin lispro Injectable (ADMELOG) 10 Unit(s) SubCutaneous three times a day before meals  magnesium oxide 400 milliGRAM(s) Oral daily  NIFEdipine XL 30 milliGRAM(s) Oral daily  predniSONE   Tablet 5 milliGRAM(s) Oral daily  tamsulosin 0.4 milliGRAM(s) Oral at bedtime  valGANciclovir 450 milliGRAM(s) Oral <User Schedule>  vancomycin    Solution 125 milliGRAM(s) Oral every 12 hours    MEDICATIONS  (PRN):      PAST MEDICAL & SURGICAL HISTORY:  GIB (gastrointestinal bleeding)    GERD with esophagitis  Gastritis &amp; Non Bleeding Ulcers    Hepatic encephalopathy    Obesity    Fatty liver disease, nonalcoholic    Renal stones  25 years ago    Hypertension    Neuropathy    Hypercholesteremia    Diabetes    S/P cholecystectomy        Vital Signs Last 24 Hrs  T(C): 36.9 (30 Dec 2020 08:00), Max: 36.9 (29 Dec 2020 17:00)  T(F): 98.5 (30 Dec 2020 08:00), Max: 98.5 (29 Dec 2020 17:00)  HR: 86 (30 Dec 2020 08:00) (78 - 89)  BP: 128/62 (30 Dec 2020 08:00) (119/55 - 162/78)  BP(mean): 94 (29 Dec 2020 21:03) (94 - 111)  RR: 18 (30 Dec 2020 08:00) (18 - 18)  SpO2: 98% (30 Dec 2020 08:00) (97% - 100%)    I&O's Summary    29 Dec 2020 07:01  -  30 Dec 2020 07:00  --------------------------------------------------------  IN: 1010 mL / OUT: 1775 mL / NET: -765 mL    30 Dec 2020 07:01  -  30 Dec 2020 09:44  --------------------------------------------------------  IN: 170 mL / OUT: 400 mL / NET: -230 mL                              7.8    4.33  )-----------( 169      ( 30 Dec 2020 06:01 )             25.2     12-30    140  |  104  |  70<H>  ----------------------------<  198<H>  5.4<H>   |  23  |  3.61<H>    Ca    8.5      30 Dec 2020 06:01  Phos  5.7     12-30  Mg     1.8     12-30    TPro  5.7<L>  /  Alb  2.9<L>  /  TBili  0.1<L>  /  DBili  x   /  AST  23  /  ALT  32  /  AlkPhos  129<H>  12-30          Babesia microti PCR, Blood. (collected 12-25-20 @ 09:01)  Source: .Blood  Final Report (12-25-20 @ 12:15):    Babesia microti PCR    Results: NOT detected    ***************Result Note*************    The detection of Babesia microti by PCR has only been    validated for whole blood; this test has not been approved    by the US Food and Drug Administration (FDA). Performance    characteristics of this assay have been determined by    [facility]. The clinical significance    of results should be considered in conjunction with the    overall clinical presentation of the patient. Result is not    intended to be used as the sole means for clinical diagnosis    or patient management decisions.    One negative sample does not necessarily rule    out the presence of a parasitic infection.            Review of systems  Gen: afebrile  Skin:  R heel wound   Head/Eyes/Ears/Mouth: No headache; Normal hearing; Normal vision w/o blurriness; No sinus pain/discomfort, sore throat  Respiratory: No dyspnea, cough, wheezing, hemoptysis  CV: No chest pain, PND, orthopnea  GI:  denies abdominal pain, diarrhea, constipation, nausea, vomiting, melena, hematochezia  : No increased frequency, dysuria, hematuria, nocturia  MSK: No joint pain/swelling; no back pain; no edema  Neuro: No dizziness/lightheadedness, weakness, seizures, numbness, tingling  Heme: No easy bruising or bleeding  Endo: No heat/cold intolerance  Psych: No significant nervousness, anxiety, stress, depression  All other systems were reviewed and are negative, except as noted.      PHYSICAL EXAM:  Constitutional: Well developed / well nourished  Eyes: Anicteric, PERRLA  ENMT: nc/at  Respiratory: CTA B/L  Cardiovascular: RRR, + heart murmur  Gastrointestinal: Soft, mild distention, non tender, incision well healed  Genitourinary: Voiding spontaneously  Extremities: SCD's in place and working bilaterally. R LE edema  Vascular: Palpable dp pulses bilaterally  Neurological: A&O x3  Skin: R heel ulcer: no drainage, no cellulitis  Musculoskeletal: Moving all extremities  Psychiatric: Responsive

## 2020-12-30 NOTE — PROGRESS NOTE ADULT - ASSESSMENT
65M with PMHx of IDDM, HLD, obesity, HFpEF with mild LV diastolic dysfunction, MGUS, chronic anemia with a history of duodenal ulcer 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  Underwent OLT on 7/2/2020, post op coure c/b VRE bacteremia tx with linezolid and delirium likely in setting of CNI toxicity (FK discontinued/Everolimus initiated 7/27).    Recent admission from 11/20-12/2 for OM of the R heel, s/p debridement with Podiatry, and was discharged with Cefepime x 6 weeks via PICC line (placed 12/1).     Initial wound cx on admission grew Pseudomonas, OR cx grew staph epi.  He now presents with from clinic rising transaminitis and MISA (SCr 3.3 from 1.5)    Hospital course c/b C. Diff colitis and worsening renal function, requiring HD    Plan to discharge patient home today with repeat labs tomorrow morning and close outpt follow up with transplant surgery, transplant hepatology and renal team.     [] ID:   - Afebrile, last fever 12/22.  bld/urine cxs negative   - C. Diff colitis: completed tx with vanco PO, with plan to continue for 3 days on discharge, last dose of Meropenem completed today  - R heel OM: s/p debridement by podiatry last admission; wound healing, no signs on infection, continue meropenem until 1/5   - CMV +/- :  Valcyte 450mg TIW (renally adjusted), CMV PCR neg from 12/15. will send repeat weekly,   - R ear otitis externa with chronic hearing loss: Completed Ofloxacin per ENT  - TTE w/o obvious evidence of endocarditis     [] Non-oliguric MISA   - K 5.4 today, given one dose of lokelma with plan to repeat labs tomorrow morning as outpatient  - required HD (12/19 and 12/22)   - Creat 3.61 today (from 3.27), plan to give 200 of 25% Albumin today. Obtain PVR today  - Anemia:  Procrit 10K sq weekly    [] s/p OLT with transaminitis  - Everolimus 2/2, MMF on hold since last admission in setting of OM; Decr Pred 5mg daily  - PPx: Valcyte renally dosed, off Bactrim  - Liver doppler with patent vessels   - LFTs stable    [] R LE edema  - r/o DVT; LE doppler negative 12/28    [] DM  - diabetic diet  - Lantus 18u qhs, Lispro 10u tid

## 2020-12-30 NOTE — PROGRESS NOTE ADULT - ATTENDING COMMENTS
Hepatology Staff: Yomi Medina MD    I saw and examined the patient along with  Dr. Singh 12-30-20 @ 10:52.    Patient Medical Record, hosptial course was reviewed and summarized as below:    Vitals: Vital Signs Last 24 Hrs  T(C): 36.9 (30 Dec 2020 08:00), Max: 36.9 (29 Dec 2020 17:00)  T(F): 98.5 (30 Dec 2020 08:00), Max: 98.5 (29 Dec 2020 17:00)  HR: 86 (30 Dec 2020 08:00) (78 - 89)  BP: 128/62 (30 Dec 2020 08:00) (119/55 - 162/78)  BP(mean): 94 (29 Dec 2020 21:03) (94 - 111)  RR: 18 (30 Dec 2020 08:00) (18 - 18)  SpO2: 98% (30 Dec 2020 08:00) (97% - 100%)    Labs:Creatinine, Serum: 3.61 mg/dL (12-30-20 @ 06:01)  Bilirubin Total, Serum: 0.1 mg/dL (12-30-20 @ 06:01)  INR: 1.08 ratio (12-30-20 @ 06:01)    I/O: I&O's Summary    29 Dec 2020 07:01  -  30 Dec 2020 07:00  --------------------------------------------------------  IN: 1010 mL / OUT: 1775 mL / NET: -765 mL    30 Dec 2020 07:01  -  30 Dec 2020 10:52  --------------------------------------------------------  IN: 170 mL / OUT: 400 mL / NET: -230 mL      Nutritional Status:   Albumin, Serum: 2.9 g/dL (12-30-20 @ 06:01)    Last 24 hour events: Overall doing well. Noted mild rise in creatinine compared to yesterday. Creat 3.61 today (from 3.27). Making good urine (no on HD)    Recommendations: Keep current dose of Everolimus ( 3/2) - follow up levels as outpatient. Cont with Prednisone 5 mg daily. CellCept- keep on hold. Blood sugar better controlled. Patient will complete his antibiotics course (last dose of Meropenem today). He will cont with PO Vanc x 3 more days for C. Diff.. Plan to give 200 of 25% Albumin today.  d/c Nystatin.  Resume Bactrim DS.  Follow up labs tomorrow as outpatient.      Plan discussed with Primary team.

## 2020-12-31 ENCOUNTER — APPOINTMENT (OUTPATIENT)
Dept: TRANSPLANT | Facility: CLINIC | Age: 65
End: 2020-12-31

## 2020-12-31 LAB
ALBUMIN SERPL ELPH-MCNC: 3.4 G/DL
ALP BLD-CCNC: 125 U/L
ALT SERPL-CCNC: 34 U/L
ANION GAP SERPL CALC-SCNC: 18 MMOL/L
AST SERPL-CCNC: 27 U/L
B BURGDOR DNA SPEC QL NAA+PROBE: NEGATIVE — SIGNIFICANT CHANGE UP
B19V DNA FLD QL NAA+PROBE: SIGNIFICANT CHANGE UP IU/ML
BASOPHILS # BLD AUTO: 0.03 K/UL
BASOPHILS NFR BLD AUTO: 0.6 %
BILIRUB SERPL-MCNC: 0.2 MG/DL
BUN SERPL-MCNC: 69 MG/DL
CALCIUM SERPL-MCNC: 8.9 MG/DL
CHLORIDE SERPL-SCNC: 103 MMOL/L
CO2 SERPL-SCNC: 19 MMOL/L
CREAT SERPL-MCNC: 3.4 MG/DL
EOSINOPHIL # BLD AUTO: 0.04 K/UL
EOSINOPHIL NFR BLD AUTO: 0.8 %
GLUCOSE SERPL-MCNC: 141 MG/DL
HCT VFR BLD CALC: 26.2 %
HGB BLD-MCNC: 8.2 G/DL
IMM GRANULOCYTES NFR BLD AUTO: 0.8 %
INR PPP: 1.05 RATIO
LYMPHOCYTES # BLD AUTO: 0.63 K/UL
LYMPHOCYTES NFR BLD AUTO: 12.3 %
MAN DIFF?: NORMAL
MCHC RBC-ENTMCNC: 29.7 PG
MCHC RBC-ENTMCNC: 31.3 GM/DL
MCV RBC AUTO: 94.9 FL
MONOCYTES # BLD AUTO: 0.39 K/UL
MONOCYTES NFR BLD AUTO: 7.6 %
NEUTROPHILS # BLD AUTO: 4 K/UL
NEUTROPHILS NFR BLD AUTO: 77.9 %
PLATELET # BLD AUTO: 188 K/UL
POTASSIUM SERPL-SCNC: 5.5 MMOL/L
PROT SERPL-MCNC: 6 G/DL
PT BLD: 12.5 SEC
RBC # BLD: 2.76 M/UL
RBC # FLD: 14 %
SODIUM SERPL-SCNC: 140 MMOL/L
WBC # FLD AUTO: 5.13 K/UL

## 2021-01-01 NOTE — PROGRESS NOTE ADULT - PROBLEM SELECTOR PROBLEM 2
(2) cough or sneeze
Adrenal insufficiency
Sepsis
Sepsis
Adrenal insufficiency

## 2021-01-01 NOTE — PHYSICAL THERAPY INITIAL EVALUATION ADULT - WEIGHT-BEARING RESTRICTIONS: GAIT, REHAB EVAL
Infant with respiratory distress at birth requiring CPAP. CBC WNL, Blood culture pending. Maternal labs, and GBS negative.     Plan:  Follow Blood culture - NGTD 2 days  S/p Amp/Gent x 36 hours   full weight-bearing

## 2021-01-03 PROBLEM — G47.33 OBSTRUCTIVE SLEEP APNEA, ADULT: Status: ACTIVE | Noted: 2020-08-11

## 2021-01-04 ENCOUNTER — APPOINTMENT (OUTPATIENT)
Dept: TRANSPLANT | Facility: CLINIC | Age: 66
End: 2021-01-04
Payer: MEDICARE

## 2021-01-04 ENCOUNTER — APPOINTMENT (OUTPATIENT)
Dept: NEPHROLOGY | Facility: CLINIC | Age: 66
End: 2021-01-04
Payer: MEDICARE

## 2021-01-04 VITALS
RESPIRATION RATE: 16 BRPM | HEART RATE: 83 BPM | TEMPERATURE: 98.1 F | BODY MASS INDEX: 36.16 KG/M2 | HEIGHT: 72 IN | WEIGHT: 267 LBS | OXYGEN SATURATION: 93 % | SYSTOLIC BLOOD PRESSURE: 131 MMHG | DIASTOLIC BLOOD PRESSURE: 69 MMHG

## 2021-01-04 DIAGNOSIS — G47.33 OBSTRUCTIVE SLEEP APNEA (ADULT) (PEDIATRIC): ICD-10-CM

## 2021-01-04 LAB — EVEROLIMUS BLD-MCNC: 2.2 NG/ML

## 2021-01-04 PROCEDURE — 99213 OFFICE O/P EST LOW 20 MIN: CPT

## 2021-01-04 PROCEDURE — 99072 ADDL SUPL MATRL&STAF TM PHE: CPT

## 2021-01-04 PROCEDURE — 99214 OFFICE O/P EST MOD 30 MIN: CPT

## 2021-01-04 RX ORDER — DIPHENHYDRAMINE HCL 50 MG/1
50 CAPSULE ORAL
Qty: 90 | Refills: 1 | Status: DISCONTINUED | COMMUNITY
Start: 2020-08-04 | End: 2021-01-04

## 2021-01-04 RX ORDER — TRAMADOL HYDROCHLORIDE 50 MG/1
50 TABLET, COATED ORAL
Refills: 0 | Status: DISCONTINUED | COMMUNITY
End: 2021-01-04

## 2021-01-04 RX ORDER — CIPROFLOXACIN HYDROCHLORIDE 750 MG/1
750 TABLET, FILM COATED ORAL DAILY
Qty: 14 | Refills: 0 | Status: DISCONTINUED | COMMUNITY
Start: 2020-11-03 | End: 2021-01-04

## 2021-01-04 RX ORDER — FUROSEMIDE 20 MG/1
20 TABLET ORAL DAILY
Qty: 60 | Refills: 1 | Status: DISCONTINUED | COMMUNITY
Start: 2020-09-01 | End: 2021-01-04

## 2021-01-04 RX ORDER — METFORMIN HYDROCHLORIDE 500 MG/1
500 TABLET, COATED ORAL DAILY
Qty: 30 | Refills: 3 | Status: DISCONTINUED | COMMUNITY
Start: 2020-10-13 | End: 2021-01-04

## 2021-01-04 RX ORDER — SENNOSIDES 8.6 MG/1
8.6 CAPSULE, GELATIN COATED ORAL
Qty: 90 | Refills: 1 | Status: DISCONTINUED | COMMUNITY
Start: 2020-07-21 | End: 2021-01-04

## 2021-01-04 RX ORDER — NYSTATIN 100000 [USP'U]/ML
100000 SUSPENSION ORAL 4 TIMES DAILY
Qty: 2 | Refills: 2 | Status: DISCONTINUED | COMMUNITY
Start: 2020-07-21 | End: 2021-01-04

## 2021-01-04 NOTE — ASSESSMENT
[FreeTextEntry1] : 65 yr old male s/p OLT on 7/2/20 for JEAN cirrhosis. Recent admission for transmisitis and MISA required HD on 12/19 and 12/22. Transaminitis resolved and doppler with good perfusion. Pt was c-diff positive, completed PO vanco on 1/2/20 and completed course of meropenem for R. heel OM. Problems with partner continue. S/p renal failure with dialysis and 25 lb weight gain. To be followed by Dr. Patel.\par \par Labs from 12/31/20:\par AST: 27, ALT: 34, AlkP: 125, TB: 0.2, INR: 1.05\par Cr: 3.40 (3.6, 3.2), Na: 140, K: 5.5, BUN: 69, Gluc: 141, Everolimus: 2.2 \par WBC 5.1, 8.2/26.2, Plt: 188. \par \par Immunosuppression: Everolimus 3mg BID, OFF MMF, Prednisone 5mg daily. \par On valcyte 450mg T/Th/Sat\par Protonix 40mg daily, ASA 81mg daily, Nifedipine 30mg daily, Flomax 0.4mg qHS, Mg 400mg daily\par Lantus 18 units qHS and humalog 10 units pre-meals + sliding scale. \par Bactrim held d/t elevated K? \par \par Plan: \par -Labs today \par -Start lasix 40mg daily for edema. Discussed low salt diet and limit fluid intake. \par -Re-start MMF 500mg BID\par -Nephrologist, Dr. Patel today \par -Podiatry follow up (R. heel wound)\par -Decrease lantus to 12 and humalog 8 with meals. \par -SW follow up - needs outpatient psychiatrist \par \par Follow up next week.

## 2021-01-04 NOTE — HISTORY OF PRESENT ILLNESS
[FreeTextEntry1] : 64 yr old s/p OLT 7/2/2020 PMHx of IDDM, HLD, obesity, HFpEF with mild LV diastolic dysfunction, MGUS, chronic anemia with a history of duodenal ulcer as well as GAVE and duodenal AVM s/p APC (last on 10/11/19) being seen today for recent MISA episode. 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  \par \par Pt most recently admitted in December 2020 with osteomyelitis and MISA.  Also had C diff.  MISA thought possibly due to AIN from Cefepime versus ATN from osteo.  He was on cefepime about 2 weeks before MISA and had large kidneys and culture negative pyuria.  Eventually improved and pt discharged off of dialysis.  Never had biopsy,.  Was not treated with prednisone for AIN due to infection.  Other GN serologies negative.  Pt gained much weight since discharge.  Feels like belly and legs are swollen.  No trouble urinating. \par \par Cause(s) of Liver Disease: Nonalcoholic Steatohepatitis (JEAN) \par CMV Status (Donor/Recipient): Positive/Negative \par Donor Characteristics: no Public Health Service increased risk, no hepatitis c (DANA+), not hepatitis B core Ab positive \par \par \par

## 2021-01-04 NOTE — ASSESSMENT
[FreeTextEntry1] : 65 yr old man with liver transplant in July 2020 on everolimus and steroids, right plantar osteo s/p debridement, baseline creatinine 1.3-1.5 with recent MISA requiring dialysis. \par \par 1. Non Oliguric MISA likely due to AIN from cefepime vs ATN in the setting of sepsis- U/A with leukocytes on admission but cultures remain negative and kidney are large.. Non-obstructive left distal ureteric stone on CT. U/S with enlarged kidneys, no olga hydronephrosis. Last HD was on 12/22.    Will repeat creatinine today.  Will also discuss stopping protoinix as it is associated with CKD.  \par 2. Liver Transplant Recipient- managed by [facility] surgery and hepatology.  Only on Envarsus and prednisone.  MMF started today at 500mgs BID.  \par 3. C diff infection - resolved.  Has occasonal soft stools. \par 4.  Heel Osteomyelitis-  off all antibiotics.  \par 5. Edema - starting lasix 40mgs PO daily.  Discussed low salt diet and limiting fluid intake.  \par 6. Diabetes - blood sugars have been low.  Decreased lantus to 12 and humolog to 8 units with meals.  \par \par Pt will return for f/u in 1 month. \par

## 2021-01-04 NOTE — HISTORY OF PRESENT ILLNESS
[Nonalcoholic Steatohepatitis (JEAN)] : Nonalcoholic Steatohepatitis (JEAN) [Liver] : Liver [Donor after brain death (DBD] : Donor after brain death (DBD) [Positive/Negative] : Positive/Negative [PHS Increased Risk] : no Public Health Service increased risk [Hepatitis C (DANA+)] : no hepatitis c (DANA+) [Hepatitis B Core Ab Positive] : not hepatitis B core Ab positive [FreeTextEntry1] : 65 yr old s/p OLT 7/2/2020  PMHx of IDDM, HLD, obesity, HFpEF with mild LV diastolic  dysfunction, MGUS, chronic anemia with a history of duodenal ulcer as well as GAVE and duodenal AVM s/p APC (last on 10/11/19), 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 \par \par Immunosuppression: Everolimus 3mg BID, OFF MMF, Prednisone 5mg daily. \par On valcyte 450mg T/Th/Sat\par Protonix 40mg daily, ASA 81mg daily, Nifedipine 30mg daily, Flomax 0.4mg qHS, Mg 400mg daily\par Lantus 18 units qHS and humalog 10 units pre-meals + sliding scale. \par Bactrim held d/t elevated K. \par \par Discharged on no diuretics. \par Patient recently admitted on 12/14/20 for  transaminitis and MISA. MISA required HD on 12/19 and 12/22.  Transaminitis resolved and doppler with good perfusion. Pt was c-diff positive, completed PO vanco on 1/2/20 and completed course of meropenem for R. heel OM. \par Presents today for follow up post discharge on 12/30/20. \par Pt with weight gain, 267 lb today, +30lbs. Swollen legs, right more than left, belly swolelen, but no trouble urinating. \par Also having difficulty with emotions and became combative at home with family members this weekend. \par Sugars have been slightly low, less than 100.

## 2021-01-05 LAB
ALBUMIN SERPL ELPH-MCNC: 3.6 G/DL
ALP BLD-CCNC: 130 U/L
ALT SERPL-CCNC: 47 U/L
ANION GAP SERPL CALC-SCNC: 13 MMOL/L
APPEARANCE: CLEAR
AST SERPL-CCNC: 29 U/L
BACTERIA: ABNORMAL
BASOPHILS # BLD AUTO: 0.03 K/UL
BASOPHILS NFR BLD AUTO: 0.6 %
BILIRUB SERPL-MCNC: 0.2 MG/DL
BILIRUBIN URINE: NEGATIVE
BLOOD URINE: NORMAL
BUN SERPL-MCNC: 63 MG/DL
CALCIUM SERPL-MCNC: 8.6 MG/DL
CHLORIDE SERPL-SCNC: 109 MMOL/L
CO2 SERPL-SCNC: 22 MMOL/L
COLOR: NORMAL
CREAT SERPL-MCNC: 2.56 MG/DL
CREAT SPEC-SCNC: 51 MG/DL
CREAT/PROT UR: 0.8 RATIO
EOSINOPHIL # BLD AUTO: 0.05 K/UL
EOSINOPHIL NFR BLD AUTO: 1.1 %
EVEROLIMUS BLD-MCNC: 3.5 NG/ML
GGT SERPL-CCNC: 27 U/L
GLUCOSE QUALITATIVE U: NORMAL
GLUCOSE SERPL-MCNC: 136 MG/DL
HCT VFR BLD CALC: 27.6 %
HGB BLD-MCNC: 8.2 G/DL
HYALINE CASTS: 1 /LPF
IMM GRANULOCYTES NFR BLD AUTO: 0.9 %
KETONES URINE: NEGATIVE
LEUKOCYTE ESTERASE URINE: ABNORMAL
LYMPHOCYTES # BLD AUTO: 0.65 K/UL
LYMPHOCYTES NFR BLD AUTO: 14 %
MAGNESIUM SERPL-MCNC: 1.7 MG/DL
MAN DIFF?: NORMAL
MCHC RBC-ENTMCNC: 28.6 PG
MCHC RBC-ENTMCNC: 29.7 GM/DL
MCV RBC AUTO: 96.2 FL
MICROSCOPIC-UA: NORMAL
MONOCYTES # BLD AUTO: 0.31 K/UL
MONOCYTES NFR BLD AUTO: 6.7 %
NEUTROPHILS # BLD AUTO: 3.57 K/UL
NEUTROPHILS NFR BLD AUTO: 76.7 %
NITRITE URINE: NEGATIVE
PH URINE: 6.5
PHOSPHATE SERPL-MCNC: 4.8 MG/DL
PLATELET # BLD AUTO: 230 K/UL
POTASSIUM SERPL-SCNC: 5.7 MMOL/L
PROT SERPL-MCNC: 6.3 G/DL
PROT UR-MCNC: 38 MG/DL
PROTEIN URINE: ABNORMAL
RBC # BLD: 2.87 M/UL
RBC # FLD: 14.5 %
RED BLOOD CELLS URINE: 7 /HPF
SODIUM SERPL-SCNC: 144 MMOL/L
SPECIFIC GRAVITY URINE: 1.01
SQUAMOUS EPITHELIAL CELLS: 4 /HPF
UROBILINOGEN URINE: NORMAL
WBC # FLD AUTO: 4.65 K/UL
WHITE BLOOD CELLS URINE: 205 /HPF

## 2021-01-06 LAB — INTERPRETATION SERPL IFE-IMP: SIGNIFICANT CHANGE UP

## 2021-01-14 ENCOUNTER — APPOINTMENT (OUTPATIENT)
Dept: TRANSPLANT | Facility: CLINIC | Age: 66
End: 2021-01-14
Payer: MEDICARE

## 2021-01-14 ENCOUNTER — APPOINTMENT (OUTPATIENT)
Dept: NEPHROLOGY | Facility: CLINIC | Age: 66
End: 2021-01-14
Payer: MEDICARE

## 2021-01-14 VITALS
SYSTOLIC BLOOD PRESSURE: 126 MMHG | WEIGHT: 247 LBS | DIASTOLIC BLOOD PRESSURE: 66 MMHG | HEIGHT: 72 IN | HEART RATE: 83 BPM | TEMPERATURE: 97.6 F | OXYGEN SATURATION: 97 % | RESPIRATION RATE: 16 BRPM | BODY MASS INDEX: 33.46 KG/M2

## 2021-01-14 PROCEDURE — 99214 OFFICE O/P EST MOD 30 MIN: CPT

## 2021-01-14 PROCEDURE — 99072 ADDL SUPL MATRL&STAF TM PHE: CPT

## 2021-01-14 RX ORDER — FERROUS SULFATE 325(65) MG
1 TABLET ORAL
Qty: 0 | Refills: 3 | DISCHARGE
Start: 2021-01-14

## 2021-01-14 NOTE — PHYSICAL EXAM
[Alert] : alert [Healthy Appearing] : healthy appearing [No Acute Distress] : no acute distress [Clear to Auscultation] : lungs were clear to auscultation bilaterally [Normal Rate] : normal rate [Regular Rhythm] : regular rhythm [Non-Tender] : Liver Edge: non-tender [Scleral Icterus] : no scleral icterus [Abdominal Bruit] : no abdominal bruit [Ascites Fluid Wave] : no ascites fluid wave [Ascites Tense] : no ascites tense [Clean] : unclean [Dry] : moist [Healing Well] : healing poorly [] : right posterior tibial palpable [Jaundice] : no jaundice [Normal Turgor] : normal turgor [Hepatic Encephalopathy] : no hepatic encephalopathy [de-identified] : massively edematous [de-identified] : minimal fluid obese no anasarca [de-identified] : Tamar Snow incision well healed, obese no ascites [FreeTextEntry1] : 1+ edema right heal small draiange redressed h202 and betadine [TextBox_40] : 1+

## 2021-01-14 NOTE — HISTORY OF PRESENT ILLNESS
[de-identified] : Pt has lost over 20 Lbs.  Feels much better, minimal swelling and more so on right foot (one with wound).  Belly feels softer.  Still tired.  Creatinine down to 2.27, potassium 4.3.  Hemoglobin still low.  Blood sugars 150-180 this week.  [FreeTextEntry1] : 64 yr old s/p OLT 7/2/2020 PMHx of IDDM, HLD, obesity, HFpEF with mild LV diastolic dysfunction, MGUS, chronic anemia with a history of duodenal ulcer as well as GAVE and duodenal AVM s/p APC (last on 10/11/19) being seen today for recent MISA episode. 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  \par \par Pt most recently admitted in December 2020 with osteomyelitis and MISA.  Also had C diff.  MISA thought possibly due to AIN from Cefepime versus ATN from osteo.  He was on cefepime about 2 weeks before MISA and had large kidneys and culture negative pyuria.  Eventually improved and pt discharged off of dialysis.  Never had biopsy,.  Was not treated with prednisone for AIN due to infection.  Other GN serologies negative.  Pt gained much weight since discharge.  Feels like belly and legs are swollen.  No trouble urinating. \par \par Cause(s) of Liver Disease: Nonalcoholic Steatohepatitis (JEAN) \par CMV Status (Donor/Recipient): Positive/Negative \par Donor Characteristics: no Public Health Service increased risk, no hepatitis c (DANA+), not hepatitis B core Ab positive \par \par \par

## 2021-01-14 NOTE — PHYSICAL EXAM
[General Appearance - Alert] : alert [General Appearance - Well Nourished] : well nourished [General Appearance - In No Acute Distress] : in no acute distress [General Appearance - Well Developed] : well developed [Sclera] : the sclera and conjunctiva were normal [Outer Ear] : the ears and nose were normal in appearance [Hearing Threshold Finger Rub Not [place]] : hearing was normal [Neck Appearance] : the appearance of the neck was normal [Neck Cervical Mass (___cm)] : no neck mass was observed [] : no respiratory distress [Respiration, Rhythm And Depth] : normal respiratory rhythm and effort [Exaggerated Use Of Accessory Muscles For Inspiration] : no accessory muscle use [Auscultation Breath Sounds / Voice Sounds] : lungs were clear to auscultation bilaterally [Heart Rate And Rhythm] : heart rate was normal and rhythm regular [Heart Sounds] : normal S1 and S2 [Heart Sounds Gallop] : no gallops [Bowel Sounds] : normal bowel sounds [Abdomen Soft] : soft [Abnormal Walk] : normal gait [Nail Clubbing] : no clubbing  or cyanosis of the fingernails [Musculoskeletal - Swelling] : no joint swelling seen [Cranial Nerves] : cranial nerves 2-12 were intact [No Focal Deficits] : no focal deficits [Oriented To Time, Place, And Person] : oriented to person, place, and time [Impaired Insight] : insight and judgment were intact [Affect] : the affect was normal [FreeTextEntry1] : belly soft, mild distension.

## 2021-01-14 NOTE — ASSESSMENT
[FreeTextEntry1] : 65 yr old man with liver transplant in July 2020 on everolimus and steroids, right plantar osteo s/p debridement, baseline creatinine 1.3-1.5 with recent MISA requiring dialysis. \par \par 1. Non Oliguric MISA likely due to AIN from cefepime vs ATN in the setting of sepsis- U/A with leukocytes on admission but cultures remain negative and kidney are large.. Non-obstructive left distal ureteric stone on CT. U/S with enlarged kidneys, no olga hydronephrosis. Last HD was on 12/22.   Creatinine improving to 2.27 and less WBCs in urine.  Protienuria has also decreased.  \par 2. Liver Transplant Recipient- managed by [facility] surgery and hepatology.  On envarsus, pred and MMF 500mgs BID. 3. Anemia - check iron studies, would start iron in interim\par 4.  Heel Osteomyelitis-  off all antibiotics.  \par 5. Edema - now minimal, would reduce lasix to 40mgs daily.  \par 6. Diabetes - blood sugars have been controlled. \par \par Pt will return for f/u in 2 weeks\par

## 2021-01-14 NOTE — HISTORY OF PRESENT ILLNESS
[Nonalcoholic Steatohepatitis (JEAN)] : Nonalcoholic Steatohepatitis (JEAN) [Liver] : Liver [Donor after brain death (DBD] : Donor after brain death (DBD) [Positive/Negative] : Positive/Negative [PHS Increased Risk] : no Public Health Service increased risk [Hepatitis C (DANA+)] : no hepatitis c (DANA+) [Hepatitis B Core Ab Positive] : not hepatitis B core Ab positive [FreeTextEntry1] : 65 yr old s/p OLT 7/2/2020  PMHx of IDDM, HLD, obesity, HFpEF with mild LV diastolic  dysfunction, MGUS, chronic anemia with a history of duodenal ulcer as well as GAVE and duodenal AVM s/p APC (last on 10/11/19), 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 \par \par Immunosuppression: Everolimus 3mg BID, MMF 500BID, Prednisone 5mg daily. \par Bactrim held d/t elevated K. \par \par Patient recently admitted on 12/14/20 -12/30/20 for  transaminitis and MISA. MISA required HD on 12/19 and 12/22.  Transaminitis resolved and doppler with good perfusion. Pt was c-diff positive, completed PO vanco on 1/2/20 and completed course of meropenem for R. heel OM. \par Presents today for follow up.\par \par Pt with weight loss of 22lbs since last visit  weight today 245lb today. Lower extremity edema significantly improved, abdomen not as tense. overall feels pretty good, ambulating better with assistance of cane,  accidentally left cane at home today.  No trouble urinating. \par Also having difficulty with emotions and became combative at home with family members this weekend. \par Sugars have been in the mid to low 100's. \par c/o drainage from heel minimal, dressing changed with H202 and betadine. Has not seen podiatry. Appt. next week. Edema and ansarca improved weight down 20lbs. Decrease lasix to 40/20mg. Labs 1 week. RTC 2weeks.

## 2021-01-14 NOTE — PLAN
[FreeTextEntry1] : Greatly improved edema and renal function. Decrease lasix and to see podiatry next week.

## 2021-01-15 ENCOUNTER — NON-APPOINTMENT (OUTPATIENT)
Age: 66
End: 2021-01-15

## 2021-01-15 LAB
ALBUMIN SERPL ELPH-MCNC: 3.6 G/DL
ALP BLD-CCNC: 131 U/L
ALT SERPL-CCNC: 32 U/L
ANION GAP SERPL CALC-SCNC: 15 MMOL/L
APPEARANCE: CLEAR
AST SERPL-CCNC: 13 U/L
BACTERIA: NEGATIVE
BASOPHILS # BLD AUTO: 0.02 K/UL
BASOPHILS NFR BLD AUTO: 0.5 %
BILIRUB SERPL-MCNC: 0.2 MG/DL
BILIRUBIN URINE: NEGATIVE
BLOOD URINE: NEGATIVE
BUN SERPL-MCNC: 49 MG/DL
CALCIUM SERPL-MCNC: 8.8 MG/DL
CHLORIDE SERPL-SCNC: 106 MMOL/L
CO2 SERPL-SCNC: 22 MMOL/L
COLOR: NORMAL
CREAT SERPL-MCNC: 2.27 MG/DL
CREAT SPEC-SCNC: 67 MG/DL
CREAT/PROT UR: 0.4 RATIO
EOSINOPHIL # BLD AUTO: 0.09 K/UL
EOSINOPHIL NFR BLD AUTO: 2.4 %
EVEROLIMUS BLD-MCNC: 4.6 NG/ML
G6PD SER-CCNC: 17.1 U/G HGB
GLUCOSE QUALITATIVE U: NEGATIVE
GLUCOSE SERPL-MCNC: 141 MG/DL
HCT VFR BLD CALC: 27.3 %
HGB BLD-MCNC: 8.4 G/DL
HYALINE CASTS: 1 /LPF
IMM GRANULOCYTES NFR BLD AUTO: 0.5 %
KETONES URINE: NEGATIVE
LEUKOCYTE ESTERASE URINE: ABNORMAL
LYMPHOCYTES # BLD AUTO: 0.97 K/UL
LYMPHOCYTES NFR BLD AUTO: 25.4 %
MAGNESIUM SERPL-MCNC: 1.5 MG/DL
MAN DIFF?: NORMAL
MCHC RBC-ENTMCNC: 28.7 PG
MCHC RBC-ENTMCNC: 30.8 GM/DL
MCV RBC AUTO: 93.2 FL
MICROSCOPIC-UA: NORMAL
MONOCYTES # BLD AUTO: 0.33 K/UL
MONOCYTES NFR BLD AUTO: 8.6 %
NEUTROPHILS # BLD AUTO: 2.39 K/UL
NEUTROPHILS NFR BLD AUTO: 62.6 %
NITRITE URINE: NEGATIVE
PH URINE: 6
PLATELET # BLD AUTO: 178 K/UL
POTASSIUM SERPL-SCNC: 4.3 MMOL/L
PROT SERPL-MCNC: 6.6 G/DL
PROT UR-MCNC: 28 MG/DL
PROTEIN URINE: ABNORMAL
RBC # BLD: 2.93 M/UL
RBC # FLD: 14.2 %
RED BLOOD CELLS URINE: 5 /HPF
SODIUM SERPL-SCNC: 143 MMOL/L
SPECIFIC GRAVITY URINE: 1.01
SQUAMOUS EPITHELIAL CELLS: 0 /HPF
UROBILINOGEN URINE: NORMAL
WBC # FLD AUTO: 3.82 K/UL
WHITE BLOOD CELLS URINE: 74 /HPF

## 2021-01-18 NOTE — ED PROCEDURE NOTE - CPROC ED INDICATIONS1
"  Assessment & Plan     Breakthrough bleeding on birth control pills  - I reviewed how this is a normal phenomenon that is expected due to both being on the minipill and also given the 2 week \"holiday\" off the OCPs about 6 weeks ago.  She can expect BTB to continue for a couple more packs and possibly the entirety of being on Micronor, until she makes the change to the combo OCPs which should start to regulate her cycles after 2-3 packs (but still w/ BTB until that time).                               No follow-ups on file.    Bryan Hawk MD  [facility] Farley is a 30 year old who presents to clinic today for the following health issues     Pt here for irregular VB on Micronor.  Pt has been on Micronor since 12/20/2019 postpartum check, and is still breastfeeding occasionally, but almost ready to wean completely.  Pt got Rx Micronor as well as combo OCPs 12/20/2020 from PCP, but ran out of her Micronor about 6 weeks ago and was off for 2 weeks.  She got a w/d bleed when she went off, and despite going back on Micronor for about 1 month, she continues to have intermittent VB, like a menses, and associated with menstrual-related pains.  She plans to go on combo OCPs soon, but is not yet ready to completely wean.               Review of Systems   Genitourinary: Positive for menstrual problem. Negative for pelvic pain and vaginal discharge.            Objective    /62 (BP Location: Right arm, Patient Position: Sitting, Cuff Size: Adult Regular)   Ht 1.651 m (5' 5\")   Wt 65.8 kg (145 lb)   LMP 01/11/2021 (Within Days)   BMI 24.13 kg/m    Body mass index is 24.13 kg/m .  Physical Exam  Constitutional:       General: She is not in acute distress.     Appearance: She is normal weight. She is not ill-appearing.   Neurological:      Mental Status: She is alert.      Declined pelvic exam after reviewing sx's c/w BTB on OCPs.                  "
ascites/suspected spontaneous bacterial peritonitis

## 2021-01-19 ENCOUNTER — APPOINTMENT (OUTPATIENT)
Dept: WOUND CARE | Facility: CLINIC | Age: 66
End: 2021-01-19
Payer: MEDICARE

## 2021-01-19 VITALS
HEART RATE: 84 BPM | TEMPERATURE: 97 F | DIASTOLIC BLOOD PRESSURE: 67 MMHG | SYSTOLIC BLOOD PRESSURE: 119 MMHG | RESPIRATION RATE: 16 BRPM

## 2021-01-19 PROCEDURE — 99072 ADDL SUPL MATRL&STAF TM PHE: CPT

## 2021-01-19 PROCEDURE — 11042 DBRDMT SUBQ TIS 1ST 20SQCM/<: CPT

## 2021-01-21 PROCEDURE — 86769 SARS-COV-2 COVID-19 ANTIBODY: CPT

## 2021-01-21 PROCEDURE — 85610 PROTHROMBIN TIME: CPT

## 2021-01-21 PROCEDURE — 82962 GLUCOSE BLOOD TEST: CPT

## 2021-01-21 PROCEDURE — 87635 SARS-COV-2 COVID-19 AMP PRB: CPT

## 2021-01-21 PROCEDURE — U0003: CPT

## 2021-01-21 PROCEDURE — 86665 EPSTEIN-BARR CAPSID VCA: CPT

## 2021-01-21 PROCEDURE — 86923 COMPATIBILITY TEST ELECTRIC: CPT

## 2021-01-21 PROCEDURE — 83690 ASSAY OF LIPASE: CPT

## 2021-01-21 PROCEDURE — 87798 DETECT AGENT NOS DNA AMP: CPT

## 2021-01-21 PROCEDURE — 86160 COMPLEMENT ANTIGEN: CPT

## 2021-01-21 PROCEDURE — 97161 PT EVAL LOW COMPLEX 20 MIN: CPT

## 2021-01-21 PROCEDURE — P9047: CPT

## 2021-01-21 PROCEDURE — 80169 DRUG ASSAY EVEROLIMUS: CPT

## 2021-01-21 PROCEDURE — 84540 ASSAY OF URINE/UREA-N: CPT

## 2021-01-21 PROCEDURE — 86334 IMMUNOFIX E-PHORESIS SERUM: CPT

## 2021-01-21 PROCEDURE — 85730 THROMBOPLASTIN TIME PARTIAL: CPT

## 2021-01-21 PROCEDURE — 85025 COMPLETE CBC W/AUTO DIFF WBC: CPT

## 2021-01-21 PROCEDURE — 80202 ASSAY OF VANCOMYCIN: CPT

## 2021-01-21 PROCEDURE — 99261: CPT

## 2021-01-21 PROCEDURE — 87533 HHV-6 DNA QUANT: CPT

## 2021-01-21 PROCEDURE — 71045 X-RAY EXAM CHEST 1 VIEW: CPT

## 2021-01-21 PROCEDURE — 87040 BLOOD CULTURE FOR BACTERIA: CPT

## 2021-01-21 PROCEDURE — 93970 EXTREMITY STUDY: CPT

## 2021-01-21 PROCEDURE — 87086 URINE CULTURE/COLONY COUNT: CPT

## 2021-01-21 PROCEDURE — 84145 PROCALCITONIN (PCT): CPT

## 2021-01-21 PROCEDURE — 85045 AUTOMATED RETICULOCYTE COUNT: CPT

## 2021-01-21 PROCEDURE — 82140 ASSAY OF AMMONIA: CPT

## 2021-01-21 PROCEDURE — 82570 ASSAY OF URINE CREATININE: CPT

## 2021-01-21 PROCEDURE — 93971 EXTREMITY STUDY: CPT

## 2021-01-21 PROCEDURE — 86850 RBC ANTIBODY SCREEN: CPT

## 2021-01-21 PROCEDURE — 87476 LYME DIS DNA AMP PROBE: CPT

## 2021-01-21 PROCEDURE — P9040: CPT

## 2021-01-21 PROCEDURE — 84156 ASSAY OF PROTEIN URINE: CPT

## 2021-01-21 PROCEDURE — 97116 GAIT TRAINING THERAPY: CPT

## 2021-01-21 PROCEDURE — 87324 CLOSTRIDIUM AG IA: CPT

## 2021-01-21 PROCEDURE — 87799 DETECT AGENT NOS DNA QUANT: CPT

## 2021-01-21 PROCEDURE — 0225U NFCT DS DNA&RNA 21 SARSCOV2: CPT

## 2021-01-21 PROCEDURE — 80048 BASIC METABOLIC PNL TOTAL CA: CPT

## 2021-01-21 PROCEDURE — 97530 THERAPEUTIC ACTIVITIES: CPT

## 2021-01-21 PROCEDURE — 93306 TTE W/DOPPLER COMPLETE: CPT

## 2021-01-21 PROCEDURE — 82436 ASSAY OF URINE CHLORIDE: CPT

## 2021-01-21 PROCEDURE — 87205 SMEAR GRAM STAIN: CPT

## 2021-01-21 PROCEDURE — 84100 ASSAY OF PHOSPHORUS: CPT

## 2021-01-21 PROCEDURE — 76770 US EXAM ABDO BACK WALL COMP: CPT

## 2021-01-21 PROCEDURE — 86900 BLOOD TYPING SEROLOGIC ABO: CPT

## 2021-01-21 PROCEDURE — 74176 CT ABD & PELVIS W/O CONTRAST: CPT

## 2021-01-21 PROCEDURE — 87070 CULTURE OTHR SPECIMN AEROBIC: CPT

## 2021-01-21 PROCEDURE — 83735 ASSAY OF MAGNESIUM: CPT

## 2021-01-21 PROCEDURE — 99285 EMERGENCY DEPT VISIT HI MDM: CPT | Mod: 25

## 2021-01-21 PROCEDURE — 87449 NOS EACH ORGANISM AG IA: CPT

## 2021-01-21 PROCEDURE — 83935 ASSAY OF URINE OSMOLALITY: CPT

## 2021-01-21 PROCEDURE — 86790 VIRUS ANTIBODY NOS: CPT

## 2021-01-21 PROCEDURE — 80076 HEPATIC FUNCTION PANEL: CPT

## 2021-01-21 PROCEDURE — 86901 BLOOD TYPING SEROLOGIC RH(D): CPT

## 2021-01-21 PROCEDURE — 84300 ASSAY OF URINE SODIUM: CPT

## 2021-01-21 PROCEDURE — 80074 ACUTE HEPATITIS PANEL: CPT

## 2021-01-21 PROCEDURE — 71250 CT THORAX DX C-: CPT

## 2021-01-21 PROCEDURE — 82550 ASSAY OF CK (CPK): CPT

## 2021-01-21 PROCEDURE — 85027 COMPLETE CBC AUTOMATED: CPT

## 2021-01-21 PROCEDURE — 80053 COMPREHEN METABOLIC PANEL: CPT

## 2021-01-21 PROCEDURE — 86663 EPSTEIN-BARR ANTIBODY: CPT

## 2021-01-21 PROCEDURE — 81001 URINALYSIS AUTO W/SCOPE: CPT

## 2021-01-21 PROCEDURE — 82150 ASSAY OF AMYLASE: CPT

## 2021-01-21 PROCEDURE — 94660 CPAP INITIATION&MGMT: CPT

## 2021-01-21 PROCEDURE — 86664 EPSTEIN-BARR NUCLEAR ANTIGEN: CPT

## 2021-01-21 PROCEDURE — 36430 TRANSFUSION BLD/BLD COMPNT: CPT

## 2021-01-21 PROCEDURE — 93005 ELECTROCARDIOGRAM TRACING: CPT

## 2021-01-21 PROCEDURE — 87493 C DIFF AMPLIFIED PROBE: CPT

## 2021-01-21 PROCEDURE — 93975 VASCULAR STUDY: CPT

## 2021-01-22 ENCOUNTER — OUTPATIENT (OUTPATIENT)
Dept: OUTPATIENT SERVICES | Facility: HOSPITAL | Age: 66
LOS: 1 days | Discharge: HOME | End: 2021-01-22
Payer: MEDICARE

## 2021-01-22 DIAGNOSIS — Z90.49 ACQUIRED ABSENCE OF OTHER SPECIFIED PARTS OF DIGESTIVE TRACT: Chronic | ICD-10-CM

## 2021-01-22 PROCEDURE — 90792 PSYCH DIAG EVAL W/MED SRVCS: CPT | Mod: 95

## 2021-01-25 DIAGNOSIS — N52.01 ERECTILE DYSFUNCTION DUE TO ARTERIAL INSUFFICIENCY: ICD-10-CM

## 2021-01-25 RX ORDER — SILDENAFIL 25 MG/1
25 TABLET ORAL
Qty: 14 | Refills: 0 | Status: ACTIVE | COMMUNITY
Start: 2021-01-25 | End: 1900-01-01

## 2021-01-25 NOTE — HISTORY OF PRESENT ILLNESS
[FreeTextEntry1] : Pt presents with right plantar heel ulcer that was recently infected back in late November. He was hospitalized in [facility] where he had a wound debridement. Since his previous visit he has been applying dry dressing to his right heel wound. He is requesting dressing supplies. He continues to ambulate in heel relief [facility] post op shoes.  He denies N/V/Sob.

## 2021-01-25 NOTE — ASSESSMENT
[FreeTextEntry1] : Pt is 64 yo who presents w/ right heel wound\par -pt was seen and examined\par -Right foot heel wound sharply debrided using suture removal kit down to the level of subQ but not beyond\par -Santyl collagenase prescribed\par -Instructed to apply santyl daily to wound bed followed DSD\par -Reappointed for 2 weeks

## 2021-01-26 RX ORDER — BLOOD-GLUCOSE METER
EACH MISCELLANEOUS
Qty: 2 | Refills: 3 | Status: ACTIVE | COMMUNITY
Start: 2020-08-05 | End: 1900-01-01

## 2021-01-31 NOTE — PROGRESS NOTE ADULT - ASSESSMENT
65 y/o M PMHx of IDDMII, HfpEF, decompensated JEAN cirrhosis, hepatic encephalopathy, previous episode of SBP, previous GBS bacteremia and ESBL UTI with left thigh hematoma and now leukocytosis and fever.    Recent CT A/P with minimal ascites  I suspect that leukocytosis and fever is reactive from hematoma vs. UTI  Lower suspicion for superinfected hematoma given physical examination (no tenderness to palpation at this time)  BCx NGTD  U/A with pyuria and UCx with > 100K ESBL Ecoli     E coli susceptible to bactrim. Would switch mari to bactrim.     #Fever / Leukocytosis / E coli UTI  --Stop Meropenem  --Recommend Bactrim DS PO BID (through 1/10)  --Continue to follow CBC with diff  --Continue to follow temperature curve  --Follow up on preliminary blood cultures    #Pre-LT Evaluation  --No absolute ID contraindications for liver transplant  --For danilo-operative antibiotics will require Meropenem and Vancomycin x1    #Encounter to Vaccinate Patient  --Influenza 12/17/19  --PCV13 12/16/19 (Due for PPSV23 in February)  --Will require Hepatitis B vaccination (will address after discharge)    I will continue to follow. Please feel free to contact me with any further questions.    Eusebio Pérez M.D.  [facility] Division of Infectious Disease  8AM-5PM: Pager Number [phone]  After Hours (or if no response): Please contact the Infectious Diseases Office at [phone] 31-Jan-2021 17:14

## 2021-02-01 ENCOUNTER — APPOINTMENT (OUTPATIENT)
Dept: ENDOCRINOLOGY | Facility: CLINIC | Age: 66
End: 2021-02-01

## 2021-02-01 NOTE — H&P ADULT - NS ABD PE RECTAL EXAM
Left message on home phone to return call regarding follow up and pathology results; office number provided.     patient refused

## 2021-02-02 ENCOUNTER — APPOINTMENT (OUTPATIENT)
Dept: WOUND CARE | Facility: CLINIC | Age: 66
End: 2021-02-02
Payer: MEDICARE

## 2021-02-02 PROCEDURE — 99072 ADDL SUPL MATRL&STAF TM PHE: CPT

## 2021-02-02 PROCEDURE — 11042 DBRDMT SUBQ TIS 1ST 20SQCM/<: CPT

## 2021-02-03 ENCOUNTER — LABORATORY RESULT (OUTPATIENT)
Age: 66
End: 2021-02-03

## 2021-02-04 ENCOUNTER — APPOINTMENT (OUTPATIENT)
Dept: TRANSPLANT | Facility: CLINIC | Age: 66
End: 2021-02-04
Payer: MEDICARE

## 2021-02-04 ENCOUNTER — APPOINTMENT (OUTPATIENT)
Dept: NEPHROLOGY | Facility: CLINIC | Age: 66
End: 2021-02-04
Payer: MEDICARE

## 2021-02-04 VITALS
BODY MASS INDEX: 30.88 KG/M2 | WEIGHT: 228 LBS | HEIGHT: 72 IN | RESPIRATION RATE: 15 BRPM | SYSTOLIC BLOOD PRESSURE: 99 MMHG | DIASTOLIC BLOOD PRESSURE: 63 MMHG | HEART RATE: 75 BPM | TEMPERATURE: 98 F | OXYGEN SATURATION: 100 %

## 2021-02-04 LAB
25(OH)D3 SERPL-MCNC: 21.2 NG/ML
ALBUMIN SERPL ELPH-MCNC: 3.6 G/DL
ALP BLD-CCNC: 132 U/L
ALT SERPL-CCNC: 19 U/L
ANION GAP SERPL CALC-SCNC: 14 MMOL/L
APPEARANCE: ABNORMAL
AST SERPL-CCNC: 13 U/L
BACTERIA: NEGATIVE
BASOPHILS # BLD AUTO: 0.02 K/UL
BASOPHILS NFR BLD AUTO: 0.5 %
BILIRUB SERPL-MCNC: 0.2 MG/DL
BILIRUBIN URINE: NEGATIVE
BLOOD URINE: ABNORMAL
BUN SERPL-MCNC: 50 MG/DL
CALCIUM SERPL-MCNC: 9 MG/DL
CHLORIDE SERPL-SCNC: 104 MMOL/L
CHOLEST SERPL-MCNC: 237 MG/DL
CO2 SERPL-SCNC: 19 MMOL/L
COLOR: YELLOW
CREAT SERPL-MCNC: 2.51 MG/DL
CREAT SPEC-SCNC: 121 MG/DL
CREAT/PROT UR: 2.8 RATIO
EOSINOPHIL # BLD AUTO: 0.06 K/UL
EOSINOPHIL NFR BLD AUTO: 1.5 %
GGT SERPL-CCNC: 20 U/L
GLUCOSE QUALITATIVE U: NORMAL
GLUCOSE SERPL-MCNC: 136 MG/DL
HCT VFR BLD CALC: 24.6 %
HDLC SERPL-MCNC: 45 MG/DL
HGB BLD-MCNC: 8 G/DL
HYALINE CASTS: 3 /LPF
IMM GRANULOCYTES NFR BLD AUTO: 0.2 %
KETONES URINE: NEGATIVE
LDLC SERPL CALC-MCNC: 128 MG/DL
LEUKOCYTE ESTERASE URINE: ABNORMAL
LYMPHOCYTES # BLD AUTO: 0.76 K/UL
LYMPHOCYTES NFR BLD AUTO: 18.8 %
MAGNESIUM SERPL-MCNC: 2 MG/DL
MAN DIFF?: NORMAL
MCHC RBC-ENTMCNC: 27.9 PG
MCHC RBC-ENTMCNC: 32.5 GM/DL
MCV RBC AUTO: 85.7 FL
MICROSCOPIC-UA: NORMAL
MONOCYTES # BLD AUTO: 0.46 K/UL
MONOCYTES NFR BLD AUTO: 11.4 %
NEUTROPHILS # BLD AUTO: 2.74 K/UL
NEUTROPHILS NFR BLD AUTO: 67.6 %
NITRITE URINE: NEGATIVE
NONHDLC SERPL-MCNC: 192 MG/DL
PH URINE: 6
PHOSPHATE SERPL-MCNC: 3.4 MG/DL
PLATELET # BLD AUTO: 161 K/UL
POTASSIUM SERPL-SCNC: 4.2 MMOL/L
PROT SERPL-MCNC: 6.5 G/DL
PROT UR-MCNC: 342 MG/DL
PROTEIN URINE: ABNORMAL
RBC # BLD: 2.87 M/UL
RBC # FLD: 14.2 %
RED BLOOD CELLS URINE: 10 /HPF
SARS-COV-2 IGG SERPL IA-ACNC: 0.08 INDEX
SARS-COV-2 IGG SERPL QL IA: NEGATIVE
SODIUM SERPL-SCNC: 138 MMOL/L
SPECIFIC GRAVITY URINE: 1.02
SQUAMOUS EPITHELIAL CELLS: 2 /HPF
TRIGL SERPL-MCNC: 319 MG/DL
URINE COMMENTS: NORMAL
UROBILINOGEN URINE: NORMAL
WBC # FLD AUTO: 4.05 K/UL
WHITE BLOOD CELLS URINE: >720 /HPF

## 2021-02-04 PROCEDURE — 99214 OFFICE O/P EST MOD 30 MIN: CPT

## 2021-02-04 PROCEDURE — 99072 ADDL SUPL MATRL&STAF TM PHE: CPT

## 2021-02-04 PROCEDURE — 99213 OFFICE O/P EST LOW 20 MIN: CPT

## 2021-02-04 RX ORDER — FUROSEMIDE 40 MG/1
40 TABLET ORAL TWICE DAILY
Qty: 60 | Refills: 2 | Status: DISCONTINUED | COMMUNITY
Start: 2021-01-04 | End: 2021-02-04

## 2021-02-04 NOTE — REASON FOR VISIT
[Follow-Up] : a follow-up visit  [Family Member] : family member [FreeTextEntry3] : OLTX  [FreeTextEntry5] : 7/2/20

## 2021-02-04 NOTE — ASSESSMENT
[FreeTextEntry1] : 65 yr old man with liver transplant in July 2020 on everolimus and steroids, right plantar osteo s/p debridement, baseline creatinine 1.3-1.5 with recent MISA requiring dialysis. \par \par 1. Non Oliguric MISA likely due to AIN from cefepime vs ATN in the setting of sepsis- U/A with leukocytes on admission but cultures remain negative and kidney are large.. Non-obstructive left distal ureteric stone on CT. U/S with enlarged kidneys, no olga hydronephrosis. Last HD was on 12/22.   Creatinine improved to 2.27 but now up to 2.5 with more pyuria and proteinuria.  Also appears dry on exam.  Stop lasix and nifedipine.  check urine culture.  If negative will need to be reassessed for AIN, possible etiologies include bactrim as it was recently resumed, and protonix.    Proteinuria can also be from Zortress. \par 2. Liver Transplant Recipient- managed by [facility] surgery and hepatology.  On envarsus, pred and MMF 500mgs BID. 3. Anemia - check iron studies, B12, folate.  Started pt on iron last visit.  May need epo if iron and vitamins normal. \par 4.  Heel Osteomyelitis-  off all antibiotics.  \par 5. Edema - resolved, stop lasix\par 6. Diabetes - blood sugars have been controlled. \par \par Pt will return for f/u in 1 month.  \par

## 2021-02-04 NOTE — HISTORY OF PRESENT ILLNESS
[Nonalcoholic Steatohepatitis (JEAN)] : Nonalcoholic Steatohepatitis (JEAN) [Liver] : Liver [Donor after brain death (DBD] : Donor after brain death (DBD) [Positive/Negative] : Positive/Negative [PHS Increased Risk] : no Public Health Service increased risk [Hepatitis C (DANA+)] : no hepatitis c (DANA+) [Hepatitis B Core Ab Positive] : not hepatitis B core Ab positive [FreeTextEntry1] : 65 yr old s/p OLT 2020  \par PMHx of IDDM, HLD, obesity, HFpEF with mild LV diastolic  dysfunction, MGUS, chronic anemia with a history of duodenal ulcer as well as GAVE and duodenal AVM s/p APC (last on 10/11/19), 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  Multiple hospitalizations due to UTIs, emphysematous cystitis (2020) and prostate abscess (3/2020).\par \par Patient most recently admitted on 20 -20 for  transaminitis, osteomyelitis and MISA. MISA required HD on  and .  Transaminitis resolved and doppler with good perfusion. Pt was c-diff positive, completed PO vanco on 20 and completed course of meropenem for R. heel OM.  \par \par Interval Events (21): weight down 20 lbs (225lb today). Lower extremity edema has resolved.  On lasix 40mg daily. \par Ambulating better with cane. No issues with urination\par Mood improved. On zoloft. Seeing psychiatry every 2 weeks. Family relationship improved at this time. Much better on Zoloft\par Intermittent diarrhea once a day, especially in the morning. \par Sugars - 130 - 150s. \par R. heel wound - saw podiatry yesterday. \par \par Labs from 2/3/21\par AST: 13, ALT: 19, AlkP: 132, TB: 0.2,\par Cr: 2.51 (2.27, 2.56, 3.40,), Na: 138, K: 4.2, BUN: 50, Gluc: 136, Everolimus: pending \par WBC 4.0, 8.0/24.6, Plt: 161\par Triglycerides: 319, Chol: 237, LDL: 128, HDL: 45, Vit D: 21.2, M.0\par UA: large leukocytes, 300 protein, Pr/Cr: 2.8. \par \par Immunosuppression: Everolimus 3mg BID, MMF 500BID, Prednisone 5mg daily. \par lasix 40mg/20mg, iron daily.\par On valcyte 450mg QOD, Bactrim SS QOD, \par Protonix 40mg daily, ASA 81mg daily, Nifedipine 30mg daily, Flomax 0.4mg qHS, Mg 400mg daily\par Lantus 12 units qHS and humalog 8 units pre-meals + sliding scale

## 2021-02-04 NOTE — HISTORY OF PRESENT ILLNESS
[de-identified] : Pt feels ok, continues to lose weight. Blood pressure is now low - 110/50 at home, lower in office today.  No further edema.  Also has occasional diarrhea in the morning.  Creatinine increased to 2.5, also now has proteinuria.  Ua with copious WBCs, pt denies dysuria.   [FreeTextEntry1] : 64 yr old s/p OLT 7/2/2020 PMHx of IDDM, HLD, obesity, HFpEF with mild LV diastolic dysfunction, MGUS, chronic anemia with a history of duodenal ulcer as well as GAVE and duodenal AVM s/p APC (last on 10/11/19) being seen today for recent MISA episode. 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  \par \par Pt most recently admitted in December 2020 with osteomyelitis and MISA.  Also had C diff.  MISA thought possibly due to AIN from Cefepime versus ATN from osteo.  He was on cefepime about 2 weeks before MISA and had large kidneys and culture negative pyuria.  Eventually improved and pt discharged off of dialysis.  Never had biopsy,.  Was not treated with prednisone for AIN due to infection.  Other GN serologies negative.  Pt gained much weight since discharge.  Feels like belly and legs are swollen.  No trouble urinating. \par \par Cause(s) of Liver Disease: Nonalcoholic Steatohepatitis (JEAN) \par CMV Status (Donor/Recipient): Positive/Negative \par Donor Characteristics: no Public Health Service increased risk, no hepatitis c (DANA+), not hepatitis B core Ab positive \par \par \par

## 2021-02-04 NOTE — PHYSICAL EXAM
[Alert] : alert [Healthy Appearing] : healthy appearing [No Acute Distress] : no acute distress [Clear to Auscultation] : lungs were clear to auscultation bilaterally [Normal Rate] : normal rate [Regular Rhythm] : regular rhythm [Non-Tender] : Liver Edge: non-tender [] : left posterior tibial palpable [Normal Turgor] : normal turgor [Scleral Icterus] : no scleral icterus [Abdominal Bruit] : no abdominal bruit [Ascites Fluid Wave] : no ascites fluid wave [Ascites Tense] : no ascites tense [Clean] : unclean [Dry] : moist [Healing Well] : healing poorly [Jaundice] : no jaundice [Hepatic Encephalopathy] : no hepatic encephalopathy [de-identified] : minimal fluid obese no anasarca [de-identified] : Tamar Snow incision well healed, obese no ascites [FreeTextEntry1] : 1/2 + edema right heel but no where else wound 2cm no drainage defect clean h202 and betadine applied, healing well

## 2021-02-04 NOTE — PHYSICAL EXAM

## 2021-02-04 NOTE — ASSESSMENT
[FreeTextEntry1] : 65 yr old male s/p OLT on 20 for JEAN. Recent admission for transmisitis and MISA required HD on 20 and 20 with improved renal function. Good graft function. \par Bilateral edema has resolved. \par \par Labs from 2/3/21\par AST: 13, ALT: 19, AlkP: 132, TB: 0.2,\par Cr: 2.51 (2.27, 2.56), Na: 138, K: 4.2, BUN: 50, Gluc: 136, Everolimus: pending \par WBC 4.0, 8.0/24.6, Plt: 161\par Triglycerides: 319, Chol: 237, LDL: 128, HDL: 45, Vit D: 21.2, M.0\par UA: large leukocytes, 300 protein\par \par Everolimus 3mg BID, MMF 500BID, Prednisone 5mg daily. \par Lasix 40mg/20mg. Lantus 12 units qHS and humalog 8 units pre-meals + sliding scale \par \par \par Plan: \par -Start Zetia 10mg daily and Crestor 5mg daily. \par -Stop diuretics. Stop Nifedpine. \par -Nephrologist, Dr. Patel today \par -Continue podiatry follow up (R. heel wound) - saw yesterday\par -Follow up in 1 month.

## 2021-02-05 PROCEDURE — 99214 OFFICE O/P EST MOD 30 MIN: CPT | Mod: 95

## 2021-02-08 LAB
BACTERIA UR CULT: NORMAL
EVEROLIMUS BLD-MCNC: 4.7 NG/ML
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
## 2021-02-10 RX ORDER — EVEROLIMUS 1 MG/1
1 TABLET ORAL TWICE DAILY
Qty: 180 | Refills: 11 | Status: DISCONTINUED | OUTPATIENT
Start: 2021-02-10 | End: 2021-02-10

## 2021-02-11 ENCOUNTER — LABORATORY RESULT (OUTPATIENT)
Age: 66
End: 2021-02-11

## 2021-02-11 DIAGNOSIS — F32.9 MAJOR DEPRESSIVE DISORDER, SINGLE EPISODE, UNSPECIFIED: ICD-10-CM

## 2021-02-12 DIAGNOSIS — R35.0 FREQUENCY OF MICTURITION: ICD-10-CM

## 2021-02-12 LAB
ALBUMIN SERPL ELPH-MCNC: 3.7 G/DL
ALP BLD-CCNC: 167 U/L
ALT SERPL-CCNC: 35 U/L
ANION GAP SERPL CALC-SCNC: 14 MMOL/L
APPEARANCE: ABNORMAL
AST SERPL-CCNC: 14 U/L
BACTERIA: ABNORMAL
BASOPHILS # BLD AUTO: 0.03 K/UL
BASOPHILS NFR BLD AUTO: 0.4 %
BILIRUB SERPL-MCNC: 0.3 MG/DL
BILIRUBIN URINE: NEGATIVE
BLOOD URINE: ABNORMAL
BUN SERPL-MCNC: 47 MG/DL
CALCIUM SERPL-MCNC: 9.2 MG/DL
CHLORIDE SERPL-SCNC: 103 MMOL/L
CO2 SERPL-SCNC: 18 MMOL/L
COLOR: ABNORMAL
CREAT SERPL-MCNC: 2.57 MG/DL
CREAT SPEC-SCNC: 161 MG/DL
CREAT/PROT UR: 0.8 RATIO
EOSINOPHIL # BLD AUTO: 0.01 K/UL
EOSINOPHIL NFR BLD AUTO: 0.1 %
GLUCOSE QUALITATIVE U: ABNORMAL
GLUCOSE SERPL-MCNC: 249 MG/DL
HCT VFR BLD CALC: 26.3 %
HGB BLD-MCNC: 8.1 G/DL
HYALINE CASTS: 0 /LPF
IMM GRANULOCYTES NFR BLD AUTO: 0.6 %
KETONES URINE: NEGATIVE
LEUKOCYTE ESTERASE URINE: ABNORMAL
LYMPHOCYTES # BLD AUTO: 0.41 K/UL
LYMPHOCYTES NFR BLD AUTO: 5.8 %
MAGNESIUM SERPL-MCNC: 1.9 MG/DL
MAN DIFF?: NORMAL
MCHC RBC-ENTMCNC: 27.6 PG
MCHC RBC-ENTMCNC: 30.8 GM/DL
MCV RBC AUTO: 89.5 FL
MICROSCOPIC-UA: NORMAL
MONOCYTES # BLD AUTO: 0.25 K/UL
MONOCYTES NFR BLD AUTO: 3.5 %
NEUTROPHILS # BLD AUTO: 6.35 K/UL
NEUTROPHILS NFR BLD AUTO: 89.6 %
NITRITE URINE: NEGATIVE
PH URINE: 6
PLATELET # BLD AUTO: 235 K/UL
POTASSIUM SERPL-SCNC: 4.7 MMOL/L
PROT SERPL-MCNC: 6.8 G/DL
PROT UR-MCNC: 124 MG/DL
PROTEIN URINE: ABNORMAL
RBC # BLD: 2.94 M/UL
RBC # FLD: 14.4 %
RED BLOOD CELLS URINE: 6 /HPF
SARS-COV-2 IGG SERPL IA-ACNC: 0.07 INDEX
SARS-COV-2 IGG SERPL QL IA: NEGATIVE
SARS-COV-2 N GENE NPH QL NAA+PROBE: NOT DETECTED
SODIUM SERPL-SCNC: 135 MMOL/L
SPECIFIC GRAVITY URINE: 1.02
SQUAMOUS EPITHELIAL CELLS: 2 /HPF
URINE COMMENTS: NORMAL
UROBILINOGEN URINE: NORMAL
WBC # FLD AUTO: 7.09 K/UL
WHITE BLOOD CELLS URINE: >720 /HPF

## 2021-02-18 LAB
BACTERIA BLD CULT: NORMAL
BACTERIA UR CULT: NORMAL
CMV DNA SPEC QL NAA+PROBE: NOT DETECTED IU/ML

## 2021-02-23 ENCOUNTER — APPOINTMENT (OUTPATIENT)
Dept: WOUND CARE | Facility: CLINIC | Age: 66
End: 2021-02-23
Payer: MEDICARE

## 2021-02-23 VITALS
HEART RATE: 94 BPM | DIASTOLIC BLOOD PRESSURE: 64 MMHG | TEMPERATURE: 96.7 F | RESPIRATION RATE: 16 BRPM | SYSTOLIC BLOOD PRESSURE: 101 MMHG

## 2021-02-23 PROCEDURE — 11042 DBRDMT SUBQ TIS 1ST 20SQCM/<: CPT

## 2021-02-23 PROCEDURE — 99072 ADDL SUPL MATRL&STAF TM PHE: CPT

## 2021-03-02 NOTE — ASSESSMENT
[FreeTextEntry1] : Pt is 64 yo who presents w/ right heel wound\par -pt was seen and examined\par -Right foot heel wound sharply debrided using #15 blade down to the level of subQ but not beyond\par -Santyl collagenase prescribed/continue\par -Rx Sherron Ag\par -Instructed to apply Sherron daily to wound bed followed DSD\par \par Plan for skin substitute next visit.\par Type: PuraPly\par Wound has shown improvement through Increased granulation tissue, decrease in wound size:\par Wound is free from infection, drainage and necrotic tissue:\par Patient has adequate blood supply as demonstrated by: bleeding \par Patient is on a comprehensive diabetic management program\par HgbA1c results on 12/14/2020: 7.6%\par Offloading measures (Surgical shoe)\par Wound has been treated with standards of care for how many weeks 4\par -Graft application next visit pending insurance approval/ PuraPly\par -RTC 2 weeks

## 2021-03-02 NOTE — HISTORY OF PRESENT ILLNESS
[FreeTextEntry1] : Pt presents with right plantar heel ulcer that was recently infected back in late November. He was hospitalized in [facility] where he had a wound debridement. Since his previous visit he has been applying dry dressing to his right heel wound.  He continues to ambulate in heel relief darco post op shoes.  He denies N/V/Sob.

## 2021-03-02 NOTE — ASSESSMENT
[FreeTextEntry1] : Pt is 64 yo who presents w/ right heel wound\par -pt was seen and examined\par -Right foot heel wound sharply debrided using suture removal kit/dermal currette down to the level of subQ but not beyond\par -Santyl collagenase prescribed/continue\par -Instructed to apply santyl daily to wound bed followed DSD\par \par \par Plan for skin substitute.\par Type: Apligraf\par Wound has shown improvement through Increased granulation tissue, decrease in wound size:\par Wound is free from infection, drainage and necrotic tissue:\par Patient has adequate blood supply as demonstrated by: bleeding \par Patient is on a comprehensive diabetic management program\par HgbA1c results on 12/14/2020: 7.6%\par Offloading measures (Surgical shoe)\par Wound has been treated with standards of care for how many weeks 4\par -Graft application next visit pending insurance approval/ apligraf\par -Reappointed for 3 weeks

## 2021-03-02 NOTE — HISTORY OF PRESENT ILLNESS
[FreeTextEntry1] : Pt presents with right plantar heel ulcer that was recently infected back in late November. He was hospitalized in [facility] where he had a wound debridement. Since his previous visit he has been applying dry dressing to his right heel wound.  He continues to ambulate in heel relief darco post op shoes.  He denies N/V/Sob.

## 2021-03-03 NOTE — PROGRESS NOTE ADULT - PROBLEM SELECTOR PLAN 3
Called to bedside as patient had self-discontinued his right Permacath secondary to confusion. Patient does have some dementia and does not remember when he removed the catheter. Right Permacath site examined and no hematoma noted. Pressure held above insertion site for ~10 minutes. Site cleaned and dressed w/ gauze. Minimal amount of blood noted on sheets. Will reassess patient again in 15-20 mins. Patient's wife Jo Ann informed. SICU attending aware.    Yamile Colorado PA-C Decompensated cirrhosis due to JEAN, MELD-Na 30   -pt on liver transplant list  -EGD in 12/19 showed small varices and GAVE  -POCUS in ED with no pocket amenable to safely perform paracentesis   -GI/Hepatology consult appreciated.   -bilirubin 9.2   -Trend LFT, coags, BMP  -hold diuretics as below.

## 2021-03-05 LAB
ALBUMIN SERPL ELPH-MCNC: 3.4 G/DL
ALP BLD-CCNC: 156 U/L
ALT SERPL-CCNC: 58 U/L
ANION GAP SERPL CALC-SCNC: 10 MMOL/L
APPEARANCE: ABNORMAL
AST SERPL-CCNC: 19 U/L
BACTERIA: NEGATIVE
BASOPHILS # BLD AUTO: 0.03 K/UL
BASOPHILS NFR BLD AUTO: 0.7 %
BILIRUB SERPL-MCNC: <0.2 MG/DL
BILIRUBIN URINE: NEGATIVE
BLOOD URINE: ABNORMAL
BUN SERPL-MCNC: 39 MG/DL
CALCIUM SERPL-MCNC: 8.6 MG/DL
CHLORIDE SERPL-SCNC: 107 MMOL/L
CO2 SERPL-SCNC: 21 MMOL/L
COLOR: YELLOW
CREAT SERPL-MCNC: 1.82 MG/DL
CREAT SPEC-SCNC: 78 MG/DL
CREAT/PROT UR: 0.5 RATIO
EOSINOPHIL # BLD AUTO: 0.07 K/UL
EOSINOPHIL NFR BLD AUTO: 1.5 %
EVEROLIMUS BLD-MCNC: 4.5 NG/ML
GLUCOSE QUALITATIVE U: ABNORMAL
GLUCOSE SERPL-MCNC: 241 MG/DL
HCT VFR BLD CALC: 23 %
HGB BLD-MCNC: 7.1 G/DL
HYALINE CASTS: 0 /LPF
IMM GRANULOCYTES NFR BLD AUTO: 1.1 %
KETONES URINE: NEGATIVE
LEUKOCYTE ESTERASE URINE: ABNORMAL
LYMPHOCYTES # BLD AUTO: 0.79 K/UL
LYMPHOCYTES NFR BLD AUTO: 17.4 %
MAGNESIUM SERPL-MCNC: 1.9 MG/DL
MAN DIFF?: NORMAL
MCHC RBC-ENTMCNC: 28 PG
MCHC RBC-ENTMCNC: 30.9 GM/DL
MCV RBC AUTO: 90.6 FL
MICROSCOPIC-UA: NORMAL
MONOCYTES # BLD AUTO: 0.36 K/UL
MONOCYTES NFR BLD AUTO: 7.9 %
NEUTROPHILS # BLD AUTO: 3.25 K/UL
NEUTROPHILS NFR BLD AUTO: 71.4 %
NITRITE URINE: NEGATIVE
PH URINE: 6
PHOSPHATE SERPL-MCNC: 3.2 MG/DL
PLATELET # BLD AUTO: 218 K/UL
POTASSIUM SERPL-SCNC: 4.5 MMOL/L
PROT SERPL-MCNC: 6 G/DL
PROT UR-MCNC: 36 MG/DL
PROTEIN URINE: ABNORMAL
RBC # BLD: 2.54 M/UL
RBC # FLD: 14.8 %
RED BLOOD CELLS URINE: 23 /HPF
SODIUM SERPL-SCNC: 138 MMOL/L
SPECIFIC GRAVITY URINE: 1.01
SQUAMOUS EPITHELIAL CELLS: 1 /HPF
URINE COMMENTS: NORMAL
UROBILINOGEN URINE: NORMAL
WBC # FLD AUTO: 4.55 K/UL
WHITE BLOOD CELLS URINE: >720 /HPF

## 2021-03-05 PROCEDURE — 99214 OFFICE O/P EST MOD 30 MIN: CPT | Mod: 95

## 2021-03-06 ENCOUNTER — TRANSCRIPTION ENCOUNTER (OUTPATIENT)
Age: 66
End: 2021-03-06

## 2021-03-08 ENCOUNTER — NON-APPOINTMENT (OUTPATIENT)
Age: 66
End: 2021-03-08

## 2021-03-08 NOTE — PROGRESS NOTE ADULT - PROBLEM/PLAN-3
DISPLAY PLAN FREE TEXT
show
DISPLAY PLAN FREE TEXT

## 2021-03-08 NOTE — PHYSICAL THERAPY INITIAL EVALUATION ADULT - LEVEL OF INDEPENDENCE: GAIT, REHAB EVAL
I called the patient and I spoke to him on the phone.  He had a knocking or thumping sound in his left ear due to uncertain etiology.  I had recommended audiological evaluation.  He underwent that.  I have reviewed the audiogram.  It shows that he has symmetrical hearing in both ears.  There is nothing unusual seen on the audiogram.    I have gone over this with the patient.  He seems to think that when he had the hearing test in a put the probe in his ear, in his right ear went straight in in in the left ear went straight up.  He is not able to really describe that any better.  I told him when I examined him in the office his ears look normal.  At this point, the patient does seem upset by this description that his ears would be normal.  He has hung up.  At this point I do not recommend any further intervention.  
moderate assist (50% patients effort)

## 2021-03-09 ENCOUNTER — APPOINTMENT (OUTPATIENT)
Dept: WOUND CARE | Facility: CLINIC | Age: 66
End: 2021-03-09
Payer: MEDICARE

## 2021-03-09 VITALS
DIASTOLIC BLOOD PRESSURE: 63 MMHG | SYSTOLIC BLOOD PRESSURE: 100 MMHG | BODY MASS INDEX: 30.88 KG/M2 | WEIGHT: 228 LBS | HEART RATE: 81 BPM | HEIGHT: 72 IN | TEMPERATURE: 97.3 F

## 2021-03-09 PROCEDURE — 99072 ADDL SUPL MATRL&STAF TM PHE: CPT

## 2021-03-09 PROCEDURE — 11042 DBRDMT SUBQ TIS 1ST 20SQCM/<: CPT

## 2021-03-09 NOTE — BEHAVIORAL HEALTH ASSESSMENT NOTE - GAIT / STATION
Left detailed msg for patient informing that Amiodarone and Metoprolol tartrate were sent by [facility] to [facility] on [address] x 2 refills.   30 tabs of Ferrous sulfate also sent by [facility]. Instructed pt to f/u with the pharmacy and to return call to the office if he is unable to obtain.    Abnormal gait / station

## 2021-03-10 RX ORDER — EVEROLIMUS 10 MG/1
4 TABLET ORAL
Qty: 0 | Refills: 3 | DISCHARGE
Start: 2021-03-10

## 2021-03-11 ENCOUNTER — APPOINTMENT (OUTPATIENT)
Dept: HEPATOLOGY | Facility: CLINIC | Age: 66
End: 2021-03-11
Payer: MEDICARE

## 2021-03-11 ENCOUNTER — APPOINTMENT (OUTPATIENT)
Dept: TRANSPLANT | Facility: CLINIC | Age: 66
End: 2021-03-11

## 2021-03-11 ENCOUNTER — APPOINTMENT (OUTPATIENT)
Dept: NEPHROLOGY | Facility: CLINIC | Age: 66
End: 2021-03-11
Payer: MEDICARE

## 2021-03-11 VITALS
SYSTOLIC BLOOD PRESSURE: 96 MMHG | DIASTOLIC BLOOD PRESSURE: 57 MMHG | HEART RATE: 84 BPM | RESPIRATION RATE: 16 BRPM | BODY MASS INDEX: 31.69 KG/M2 | TEMPERATURE: 97.9 F | WEIGHT: 234 LBS | HEIGHT: 72 IN

## 2021-03-11 PROCEDURE — 99072 ADDL SUPL MATRL&STAF TM PHE: CPT

## 2021-03-11 PROCEDURE — 99214 OFFICE O/P EST MOD 30 MIN: CPT

## 2021-03-11 RX ORDER — SULFAMETHOXAZOLE AND TRIMETHOPRIM 400; 80 MG/1; MG/1
400-80 TABLET ORAL
Qty: 90 | Refills: 3 | Status: DISCONTINUED | COMMUNITY
Start: 2020-07-21 | End: 2021-03-11

## 2021-03-11 RX ORDER — CIPROFLOXACIN HYDROCHLORIDE 500 MG/1
500 TABLET, FILM COATED ORAL
Qty: 14 | Refills: 0 | Status: DISCONTINUED | COMMUNITY
Start: 2021-02-12 | End: 2021-03-11

## 2021-03-11 NOTE — PHYSICAL EXAM
[General Appearance - Alert] : alert [General Appearance - In No Acute Distress] : in no acute distress [General Appearance - Well Nourished] : well nourished [General Appearance - Well Developed] : well developed [Sclera] : the sclera and conjunctiva were normal [Outer Ear] : the ears and nose were normal in appearance [Hearing Threshold Finger Rub Not [place]] : hearing was normal [Neck Appearance] : the appearance of the neck was normal [Neck Cervical Mass (___cm)] : no neck mass was observed [] : no respiratory distress [Respiration, Rhythm And Depth] : normal respiratory rhythm and effort [Exaggerated Use Of Accessory Muscles For Inspiration] : no accessory muscle use [Auscultation Breath Sounds / Voice Sounds] : lungs were clear to auscultation bilaterally [Heart Rate And Rhythm] : heart rate was normal and rhythm regular [Heart Sounds] : normal S1 and S2 [Heart Sounds Gallop] : no gallops [Bowel Sounds] : normal bowel sounds [Abdomen Soft] : soft [FreeTextEntry1] : belly soft, mild distension.  [Abnormal Walk] : normal gait [Nail Clubbing] : no clubbing  or cyanosis of the fingernails [Musculoskeletal - Swelling] : no joint swelling seen [Cranial Nerves] : cranial nerves 2-12 were intact [No Focal Deficits] : no focal deficits [Oriented To Time, Place, And Person] : oriented to person, place, and time [Impaired Insight] : insight and judgment were intact [Affect] : the affect was normal

## 2021-03-11 NOTE — ASSESSMENT
[FreeTextEntry1] : 65 yr old man with liver transplant in July 2020 on everolimus and steroids, right plantar osteo s/p debridement, baseline creatinine 1.3-1.5 with recent MISA requiring dialysis. \par \par 1. Non Oliguric MISA likely due to AIN from cefepime vs ATN in the setting of sepsis- U/A with leukocytes on admission but cultures remain negative and kidney are large.. Non-obstructive left distal ureteric stone on CT. U/S with enlarged kidneys, no olga hydronephrosis. Last HD was on 12/22.   Creatinine down to 2.2 then up to 2.5 when bactrim resumed.  Bactrim stopped and down to 1.8.  Suspect bactrim as culprit.  Will start mepron for PJP prophylaxis and will check G6PD next visit.  Has low level proteinuria from Zortress or intrinsic renal disease.  \par 2. Liver Transplant Recipient- managed by [facility] and hepatology.  On envarsus, pred and MMF 500mgs BID. 3. Anemia - started on procrit.  \par 4.  Heel Osteomyelitis-  off all antibiotics.  \par 5. Edema - monitor.  If worsens will need to resume lasix. \par \par Pt will return for f/u in 1 month.  \par

## 2021-03-11 NOTE — HISTORY OF PRESENT ILLNESS
[FreeTextEntry1] : 64 yr old s/p OLT 7/2/2020 PMHx of IDDM, HLD, obesity, HFpEF with mild LV diastolic dysfunction, MGUS, chronic anemia with a history of duodenal ulcer as well as GAVE and duodenal AVM s/p APC (last on 10/11/19) being seen today for recent MISA episode. 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  \par \par Pt most recently admitted in December 2020 with osteomyelitis and MISA.  Also had C diff.  MISA thought possibly due to AIN from Cefepime versus ATN from osteo.  He was on cefepime about 2 weeks before MISA and had large kidneys and culture negative pyuria.  Eventually improved and pt discharged off of dialysis.  Never had biopsy,.  Was not treated with prednisone for AIN due to infection.  Other GN serologies negative.  Pt gained much weight since discharge.  Feels like belly and legs are swollen.  No trouble urinating. \par \par Cause(s) of Liver Disease: Nonalcoholic Steatohepatitis (JEAN) \par CMV Status (Donor/Recipient): Positive/Negative \par Donor Characteristics: no Public Health Service increased risk, no hepatitis c (DANA+), not hepatitis B core Ab positive \par \par \par  [de-identified] : Pt feels well.  Has gained back mild LE edema.  Creatinine improved to 1.8 this week.  still has pyuria.  Was given a course of cipro.

## 2021-03-15 RX ORDER — ORAL SEMAGLUTIDE 3 MG/1
3 TABLET ORAL DAILY
Qty: 30 | Refills: 0 | Status: DISCONTINUED | COMMUNITY
Start: 2021-03-11 | End: 2021-03-15

## 2021-03-16 ENCOUNTER — APPOINTMENT (OUTPATIENT)
Dept: WOUND CARE | Facility: CLINIC | Age: 66
End: 2021-03-16

## 2021-03-16 RX ORDER — ELECTROLYTES/DEXTROSE
31G X 5 MM SOLUTION, ORAL ORAL
Qty: 1 | Refills: 1 | Status: ACTIVE | COMMUNITY
Start: 2021-03-16 | End: 1900-01-01

## 2021-03-23 ENCOUNTER — APPOINTMENT (OUTPATIENT)
Dept: WOUND CARE | Facility: CLINIC | Age: 66
End: 2021-03-23
Payer: MEDICARE

## 2021-03-23 PROCEDURE — 15275 SKIN SUB GRAFT FACE/NK/HF/G: CPT

## 2021-03-23 PROCEDURE — 99072 ADDL SUPL MATRL&STAF TM PHE: CPT

## 2021-03-23 NOTE — ASSESSMENT
[FreeTextEntry1] : Pt is 66 yo who presents w/ right heel wound\par -pt was seen and examined\par -Right foot heel wound sharply debrided using #15 blade down to the level of subQ but not beyond\par -apligraft 20 units applied to R heel wound today woith wound veil/alleyvne foam/dsd.\par -pt to change out dressing only, w/ gauze, foam, ABD pad, cling\par -RTC 2 weeks, will consider another apligraf application pendng authorization

## 2021-03-23 NOTE — HISTORY OF PRESENT ILLNESS
[FreeTextEntry1] : Pt presents with right plantar heel ulcer that was recently infected back in late November. He was hospitalized in [facility] where he had a wound debridement. Since his previous visit he has been applying dry dressing to his right heel wound.  He continues to ambulate in heel relief darco post op shoes.  He denies N/V/Sob.

## 2021-03-23 NOTE — HISTORY OF PRESENT ILLNESS
[FreeTextEntry1] : Pt presents with right plantar heel ulcer that was recently infected back in late November. He was hospitalized in [facility] where he had a wound debridement. Since his previous visit he has been applying dry dressing to his right heel wound.  He continues to ambulate in heel relief darco post op shoes.  He denies N/V/Sob.

## 2021-03-25 ENCOUNTER — APPOINTMENT (OUTPATIENT)
Dept: TRANSPLANT | Facility: CLINIC | Age: 66
End: 2021-03-25

## 2021-03-26 LAB
ALBUMIN SERPL ELPH-MCNC: 3.6 G/DL
ALP BLD-CCNC: 112 U/L
ALT SERPL-CCNC: 21 U/L
ANION GAP SERPL CALC-SCNC: 9 MMOL/L
AST SERPL-CCNC: 14 U/L
BASOPHILS # BLD AUTO: 0.03 K/UL
BASOPHILS NFR BLD AUTO: 0.9 %
BILIRUB SERPL-MCNC: 0.2 MG/DL
BUN SERPL-MCNC: 39 MG/DL
CALCIUM SERPL-MCNC: 8.9 MG/DL
CHLORIDE SERPL-SCNC: 111 MMOL/L
CO2 SERPL-SCNC: 22 MMOL/L
CREAT SERPL-MCNC: 1.67 MG/DL
EOSINOPHIL # BLD AUTO: 0.12 K/UL
EOSINOPHIL NFR BLD AUTO: 3.7 %
EVEROLIMUS BLD-MCNC: 2.9 NG/ML
GGT SERPL-CCNC: 16 U/L
GLUCOSE SERPL-MCNC: 89 MG/DL
HCT VFR BLD CALC: 23.4 %
HGB BLD-MCNC: 6.9 G/DL
IMM GRANULOCYTES NFR BLD AUTO: 0.6 %
LYMPHOCYTES # BLD AUTO: 0.65 K/UL
LYMPHOCYTES NFR BLD AUTO: 19.9 %
MAGNESIUM SERPL-MCNC: 2.1 MG/DL
MAN DIFF?: NORMAL
MCHC RBC-ENTMCNC: 28.3 PG
MCHC RBC-ENTMCNC: 29.5 GM/DL
MCV RBC AUTO: 95.9 FL
MONOCYTES # BLD AUTO: 0.34 K/UL
MONOCYTES NFR BLD AUTO: 10.4 %
NEUTROPHILS # BLD AUTO: 2.11 K/UL
NEUTROPHILS NFR BLD AUTO: 64.5 %
PLATELET # BLD AUTO: 173 K/UL
POTASSIUM SERPL-SCNC: 4.6 MMOL/L
PROT SERPL-MCNC: 5.7 G/DL
RBC # BLD: 2.44 M/UL
RBC # FLD: 17.1 %
SODIUM SERPL-SCNC: 142 MMOL/L
TACROLIMUS SERPL-MCNC: <2 NG/ML
WBC # FLD AUTO: 3.27 K/UL

## 2021-03-26 RX ORDER — PNV NO.95/FERROUS FUM/FOLIC AC 28MG-0.8MG
100 TABLET ORAL DAILY
Qty: 30 | Refills: 3 | Status: ACTIVE | COMMUNITY
Start: 2021-03-26 | End: 1900-01-01

## 2021-03-26 RX ORDER — FERROUS SULFATE 325(65) MG
325 (65 FE) TABLET ORAL TWICE DAILY
Qty: 60 | Refills: 3 | Status: ACTIVE | COMMUNITY
Start: 2021-01-14 | End: 1900-01-01

## 2021-03-31 LAB — G6PD SER-CCNC: 21.6 U/G HGB

## 2021-04-01 ENCOUNTER — APPOINTMENT (OUTPATIENT)
Dept: TRANSPLANT | Facility: CLINIC | Age: 66
End: 2021-04-01
Payer: MEDICARE

## 2021-04-01 ENCOUNTER — APPOINTMENT (OUTPATIENT)
Dept: HEPATOLOGY | Facility: CLINIC | Age: 66
End: 2021-04-01

## 2021-04-01 VITALS
TEMPERATURE: 98 F | SYSTOLIC BLOOD PRESSURE: 105 MMHG | DIASTOLIC BLOOD PRESSURE: 57 MMHG | BODY MASS INDEX: 32.51 KG/M2 | WEIGHT: 240 LBS | RESPIRATION RATE: 12 BRPM | HEART RATE: 87 BPM | HEIGHT: 72 IN | OXYGEN SATURATION: 100 %

## 2021-04-01 PROCEDURE — 99072 ADDL SUPL MATRL&STAF TM PHE: CPT

## 2021-04-01 PROCEDURE — 99214 OFFICE O/P EST MOD 30 MIN: CPT

## 2021-04-01 RX ORDER — EVEROLIMUS 0.5 MG/1
0.5 TABLET ORAL DAILY
Qty: 15 | Refills: 0 | Status: DISCONTINUED | COMMUNITY
Start: 2020-08-04 | End: 2021-04-01

## 2021-04-01 RX ORDER — NIFEDIPINE 30 MG/1
30 TABLET, FILM COATED, EXTENDED RELEASE ORAL DAILY
Qty: 30 | Refills: 6 | Status: DISCONTINUED | COMMUNITY
Start: 2020-12-30 | End: 2021-04-01

## 2021-04-01 RX ORDER — OMEGA-3/DHA/EPA/FISH OIL 300-1000MG
400 CAPSULE ORAL DAILY
Qty: 30 | Refills: 5 | Status: DISCONTINUED | COMMUNITY
Start: 2020-07-21 | End: 2021-04-01

## 2021-04-01 NOTE — REVIEW OF SYSTEMS
[Diarrhea] : diarrhea [Negative] : Heme/Lymph [FreeTextEntry7] : Diarrhea stopped  [FreeTextEntry9] : impaired gait d/t R heel wound

## 2021-04-01 NOTE — ASSESSMENT
[FreeTextEntry1] : 65 yr old male s/p OLT on 7/2/20 for JEAN on everolimus, MMF and pred stable liver function, Cr improved 1.67 (previously 1.8, 2.57) low grade proteinuria \par \par \par Labs from 3/25/21 \par TB 0.2, AST 14, ALT 21,  \par Cr: 1.67  (previously 1.82, 2.57, 2.51), Na: 142, K: 4.6, BUN: 39, Gluc: 89, Everolimus: 2.9\par WBC 3.27, H/H 6.9/ 23.4, \par 3/3/21-Urine P/cR 0.5\par \par Immuno: \par Everolimus 3mg BID, MMF 500BID, Prednisone 5mg daily. \par \par \par Plan: \par -Continue renal follow up Dr. Patel \par -Continue podiatry follow up (R. heel wound) \par -Follow up in

## 2021-04-01 NOTE — HISTORY OF PRESENT ILLNESS
[Nonalcoholic Steatohepatitis (JEAN)] : Nonalcoholic Steatohepatitis (JEAN) [Liver] : Liver [PHS Increased Risk] : no Public Health Service increased risk [Hepatitis C (DANA+)] : no hepatitis c (DANA+) [Hepatitis B Core Ab Positive] : not hepatitis B core Ab positive [FreeTextEntry1] : 65 yr old s/p OLT 2020  \par PMHx of IDDM, HLD, obesity, HFpEF with mild LV diastolic  dysfunction, MGUS, chronic anemia with a history of duodenal ulcer as well as GAVE and duodenal AVM s/p APC (last on 10/11/19), 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  Multiple hospitalizations due to UTIs, emphysematous cystitis (2020) and prostate abscess (3/2020). \par \par Last Hospitalization: \par 20 -20 for  transaminitis, osteomyelitis and MISA. MISA required HD on  and .  Transaminitis resolved and doppler with good perfusion. Pt was c-diff positive, completed PO vanco on 20 and completed course of meropenem for R. heel OM.  \par \par Interval Events (21): weight gain 12 lbs (240lb today, 228lbs 3/9/20). Lower extremity edema improved R>L  Not on lasix. \par Ambulating better with cane. No issues with urination\par R. heel wound - Improving recent debridement with apligraft application following with podiatry\par Mood improved. On zoloft. Seeing psychiatry every 2 weeks. Family relationship improved at this time. \par \par Sugars - 120-130s \par \par \par Labs from 3/25/21 \par TB 0.2, AST 14, ALT 21,  \par Cr: 1.67  (previously 1.82, 2.57, 2.51), Na: 142, K: 4.6, BUN: 39, Gluc: 89, Everolimus: 2.9\par WBC 3.27, H/H 6.9/ 23.4, \par \par 2/3/21: Triglycerides: 319, Chol: 237, LDL: 128, HDL: 45, Vit D: 21.2, M.0\par UA: large leukocytes, 300 protein, Pr/Cr: 2.8. \par \par Immunosuppression:\par Everolimus 3mg BID, MMF 500BID, Prednisone 5mg daily.

## 2021-04-01 NOTE — PHYSICAL EXAM
[Alert] : alert [Scleral Icterus] : no scleral icterus [Clear to Auscultation] : lungs were clear to auscultation bilaterally [Normal Rate] : normal rate [Soft] : soft [No Edema] : no edema [] : right femoral palpable [Asterixis] : no asterixis [Hepatic Encephalopathy] : no hepatic encephalopathy [Depression] : depression [FreeTextEntry1] : Gained 12 lbs and s/p skin graft to foot, 3/25, dressing intact, first pheizer shot 1 week ago. Needs nutrtion and go up on epo. Decrease cellcept to 500/250mg, Go up on epo [id number] and get labs with retic and no fk level. Labs 2 wweeks. RTC 2 mos.

## 2021-04-05 ENCOUNTER — NON-APPOINTMENT (OUTPATIENT)
Age: 66
End: 2021-04-05

## 2021-04-06 ENCOUNTER — APPOINTMENT (OUTPATIENT)
Dept: WOUND CARE | Facility: CLINIC | Age: 66
End: 2021-04-06
Payer: MEDICARE

## 2021-04-06 VITALS
DIASTOLIC BLOOD PRESSURE: 57 MMHG | HEART RATE: 88 BPM | TEMPERATURE: 97.3 F | SYSTOLIC BLOOD PRESSURE: 104 MMHG | RESPIRATION RATE: 14 BRPM

## 2021-04-06 PROCEDURE — 11042 DBRDMT SUBQ TIS 1ST 20SQCM/<: CPT

## 2021-04-06 PROCEDURE — 99072 ADDL SUPL MATRL&STAF TM PHE: CPT

## 2021-04-09 PROCEDURE — 99214 OFFICE O/P EST MOD 30 MIN: CPT | Mod: 95

## 2021-04-15 ENCOUNTER — APPOINTMENT (OUTPATIENT)
Dept: TRANSPLANT | Facility: CLINIC | Age: 66
End: 2021-04-15

## 2021-04-19 ENCOUNTER — NON-APPOINTMENT (OUTPATIENT)
Age: 66
End: 2021-04-19

## 2021-04-20 ENCOUNTER — APPOINTMENT (OUTPATIENT)
Dept: WOUND CARE | Facility: CLINIC | Age: 66
End: 2021-04-20
Payer: MEDICARE

## 2021-04-20 PROCEDURE — 15275 SKIN SUB GRAFT FACE/NK/HF/G: CPT

## 2021-04-20 PROCEDURE — 99072 ADDL SUPL MATRL&STAF TM PHE: CPT

## 2021-04-20 NOTE — PLAN
[FreeTextEntry1] : Plan for Skin Substitute.\par Type: Apligraf\par Wound has shown improvement through Increased granulation tissue and decrease in wound size\par Wound is free from infection, drainage and necrotic tissue\par Patient has adequate blood supply as demonstrated by bleeding\par Patient is on a comprehensive diabetic management program:\par HgbA1c results/date (7.6 as of 12/2020)\par Offloading measures cam boot\par Wound has been treated with standards of care for  weeks\par

## 2021-04-20 NOTE — ASSESSMENT
[FreeTextEntry1] : Pt is 64 yo who presents w/ right heel wound\par -pt was seen and examined\par -Right foot heel wound sharply debrided using #15 blade down to the level of subQ but not beyond\par -Patient to return to [facility]ik and dsd daily, being done by son\par -RTC 2 weeks, will consider another Apligraf application pending authorization

## 2021-04-22 LAB
ALBUMIN SERPL ELPH-MCNC: 3.9 G/DL
ALP BLD-CCNC: 105 U/L
ALT SERPL-CCNC: 22 U/L
ANION GAP SERPL CALC-SCNC: 10 MMOL/L
AST SERPL-CCNC: 14 U/L
BASOPHILS # BLD AUTO: 0.03 K/UL
BASOPHILS NFR BLD AUTO: 0.9 %
BILIRUB SERPL-MCNC: 0.2 MG/DL
BUN SERPL-MCNC: 35 MG/DL
CALCIUM SERPL-MCNC: 9.1 MG/DL
CHLORIDE SERPL-SCNC: 110 MMOL/L
CO2 SERPL-SCNC: 25 MMOL/L
CREAT SERPL-MCNC: 1.68 MG/DL
EOSINOPHIL # BLD AUTO: 0.12 K/UL
EOSINOPHIL NFR BLD AUTO: 3.5 %
EVEROLIMUS BLD-MCNC: 6.1 NG/ML
FERRITIN SERPL-MCNC: 229 NG/ML
FOLATE SERPL-MCNC: 16.3 NG/ML
GLUCOSE SERPL-MCNC: 116 MG/DL
HCT VFR BLD CALC: 29.1 %
HGB BLD-MCNC: 8.6 G/DL
IMM GRANULOCYTES NFR BLD AUTO: 0.3 %
IRON SATN MFR SERPL: 34 %
IRON SERPL-MCNC: 64 UG/DL
LYMPHOCYTES # BLD AUTO: 0.74 K/UL
LYMPHOCYTES NFR BLD AUTO: 21.4 %
MAGNESIUM SERPL-MCNC: 1.9 MG/DL
MAN DIFF?: NORMAL
MCHC RBC-ENTMCNC: 28.8 PG
MCHC RBC-ENTMCNC: 29.6 GM/DL
MCV RBC AUTO: 97.3 FL
MONOCYTES # BLD AUTO: 0.38 K/UL
MONOCYTES NFR BLD AUTO: 11 %
NEUTROPHILS # BLD AUTO: 2.17 K/UL
NEUTROPHILS NFR BLD AUTO: 62.9 %
PLATELET # BLD AUTO: 180 K/UL
POTASSIUM SERPL-SCNC: 4.9 MMOL/L
PROT SERPL-MCNC: 6.1 G/DL
RBC # BLD: 2.99 M/UL
RBC # FLD: 14.6 %
SODIUM SERPL-SCNC: 144 MMOL/L
TIBC SERPL-MCNC: 188 UG/DL
TRANSFERRIN SERPL-MCNC: 160 MG/DL
UIBC SERPL-MCNC: 124 UG/DL
VIT B12 SERPL-MCNC: 458 PG/ML
WBC # FLD AUTO: 3.45 K/UL

## 2021-04-23 NOTE — H&P ADULT - NSCORESITESY/N_GEN_A_CORE_RD
[facility] ambulatory encounter  CARDIOLOGY OFFICE VISIT    PCP: CHARLES Szymanski     CHIEF COMPLAINT:    Chief Complaint   Patient presents with   • Office Visit     New pt-episodic lightheadedness. He has lightheadedness daily that lasts a few minutes. A year and half ago he fainted. Increasing fluid intake has helped. A couple months ago he almost passed out. He has a tingling sensation on top of his head and halo in his vision during his lightheaded episodes.Pt denies CP, palpitations and edema.        PERTINENT HISTORY   lightheadness    HISTORY OF PRESENT ILLNESS   Abdias Velasquez is a 67 year old male seen today to evaluate his ongoing lightheadedness.  In November 2019 he had a a syncopal event during the middle of the night when he got up to use the bathroom.  He denied any dizziness prior to the event.  Since then he has had no further syncopal events but does have chronic lightheadedness.  He states that the lightheadedness occurs once he is up and walking a short distance.  It does not happen at all times in fact he is quite active walking daily and denies any chest pain, dyspnea on exam exertion or limitations to his activity.  When he does however get the lightheadedness he will have some visual changes almost a “water type feeling in his vision and a halo effect.   He did originally have a workup done with his primary care provider back in November 2019 that did include an echocardiogram which did show some aortic valve sclerosis with mild regurgitation and a mildly dilated ascending aorta at 3.8.  He had a normal function and no wall motion abnormalities.  He also recently completed 48 hour Holter monitor at [facility] where he did have symptoms of lightheadedness as described above.  During the time he remained in sinus rhythm with no significant ectopy.  There was no pause, atrial fib or flutter noted.  His carotid ultrasound was normal without stenosis and is CT of his head showed no acute  intracranial abnormalities.    The patient is additional history is significant for coronary calcification, he had a negative stress echocardiogram in 2014, he remains on statin therapy but no longer takes a daily baby aspirin.  REVIEW OF SYSTEMS   All other systems are reviewed and are negative except as documented in the HPI.     HISTORIES   I have reviewed the past medical history, family history, social history, medications and allergies listed in the medical record as obtained by my nursing staff and support staff and agree with their documentation.    PHYSICAL EXAM     Vital Signs:    Vitals:    04/27/21 1335 04/27/21 1356 04/27/21 1357   BP: 135/78 114/72 101/56   BP Location: LUE - Left upper extremity LUE - Left upper extremity LUE - Left upper extremity   Patient Position: Supine Sitting Standing   Pulse: (!) 57 (!) 59 63   Weight: 110.7 kg     Height: 6' 4\" (1.93 m)       General:   Alert, cooperative, conversive in no acute distress.  Skin:  Warm and dry without rash.    Head:  Normocephalic-atraumatic.   Neck:  Trachea is midline. No adenopathy.  Normal thyroid without mass or tenderness.  Eyes:  Normal conjunctiva and sclera.  Cardiovascular: regular rate and rhythm and systolic murmur: 2/6  Respiratory: clear to auscultation bilaterally  Gastrointestinal:  Soft and nontender.  Normal bowel sounds.  No hepatomegaly or splenomegaly.   Extremities:  no edema, redness or tenderness in the calves or thighs  Neuro:   Orientated x 4.  No focal deficits or lateralizing signs.  Psychiatric:   Cooperative.  Appropriate mood & affect.    LABORATORY DATA     Lab Results   Component Value Date    SODIUM 144 04/27/2021    CHLORIDE 106 04/27/2021    CO2 29 04/27/2021    POTASSIUM 4.5 04/27/2021    BUN 20 04/27/2021    CREATININE 1.34 (H) 04/27/2021    GLUCOSE 97 04/27/2021     Lab Results   Component Value Date    WBC 7.6 01/08/2021    HCT 39.9 01/08/2021    HGB 14.0 01/08/2021     01/08/2021    and   Lab  Results   Component Value Date    CHOLESTEROL 162 06/29/2020    HDL 56 06/29/2020    CALCLDL 78 06/29/2020    TRIGLYCERIDE 138 06/29/2020     Lab Results   Component Value Date    AST 19 04/27/2021    GPT 26 04/27/2021    ALKPT 114 04/27/2021    BILIRUBIN 0.8 04/27/2021     TSH (mcUnits/mL)   Date Value   11/25/2019 0.998         DIAGNOSTIC IMAGING   All pertinent diagnostic imaging reviewed.  48 Hr Holter 3/12/21 at [facility]  Conclusions:  Is a 48-hour Holter monitor patient with history of   syncope.  Findings are benign with no significant arrhythmias   noted.  There was just rare atrial and ventricular ectopy noted.    Symptoms as above corresponded with sinus rhythm.     Echo 11/29/19  Normal left ventricular size, systolic function and wall thickness, with no regional wall motion abnormalities.  Grade I/IV diastolic dysfunction (abnormal relaxation filling pattern), normal to mildly elevated filling pressures.  Global longitudinal strain -19 %.  Normal right ventricular size and systolic function, RVSP 16.9 mmHg.  Aortic valve sclerosis with mild regurgitation.  Mildly dilated ascending aorta at 3.8 cm.  Dilated IVC with normal respiratory variation.  Mildly dilated pulmonary artery.    Carotid US 1125/19  IMPRESSION:   1. Sonographically right carotid arteries show no significant stenoses.  Velocities are within normal limits.  2. Sonographically, left carotid arteries show no significant stenoses.  Velocities are within normal limits.  3. Appropriate blood flow bilateral vertebral arteries towards the brain.    CT Calcium Score 5/16/14  CONCLUSION: Significant calcifications of coronary arteries. Total calcium  score 1537, greater than 90th percentile for patient age.    Stress Echo 4/30/14  Abnormal stress echocardiogram with small area of ischemia in diagonal branch distribution.  ASSESSMENT & PLAN   Orthostatic hypotension.  Blood pressures obtained in clinic today show evidence of orthostatic hypotension.  He  is work to increase fluids recently started to increase his salt intake.  I have asked him to consider compression stockings.  Would like to repeat echocardiogram for surveillance I do appreciate a cardiac murmur today so will reassess the aortic sclerosis with regurgitation that was noted in 2019. If he continues to remain symptomatic, consider midodrine.  Patient will return to clinic in 1 month and follow up with Dr. Larsen     Dyslipidemia, on statin.  Fasting labs were completed today.    Aortic valve sclerosis with mild regurgitation.  Plan to repeat echocardiogram for surveillance.  Patient is already on statin therapy I did advise him to start aspirin 81 mg daily.    Coronary calcification..  Continue statin therapy, add baby aspirin we did discuss repeating nuclear med stress test, patient would like to hold off on this at this time.  He remains very active and denies any dyspnea, chest pain or palpitations.  He does however state he has lightheadedness only on some occasions once he starts ambulating.    Patient will Return in about 4 weeks (around 5/25/2021) for f/u lightheadness w Chava. (NP for Chava). or sooner if problems arise.    Instructions provided as documented in the after visit summary.           No

## 2021-04-26 ENCOUNTER — NON-APPOINTMENT (OUTPATIENT)
Age: 66
End: 2021-04-26

## 2021-04-27 ENCOUNTER — APPOINTMENT (OUTPATIENT)
Dept: WOUND CARE | Facility: CLINIC | Age: 66
End: 2021-04-27
Payer: MEDICARE

## 2021-04-27 VITALS — WEIGHT: 240 LBS | TEMPERATURE: 96.8 F | BODY MASS INDEX: 32.51 KG/M2 | HEIGHT: 72 IN

## 2021-04-27 PROCEDURE — 0598T R-T FLUOR WOUND IMG 1ST SITE: CPT

## 2021-04-27 PROCEDURE — 99072 ADDL SUPL MATRL&STAF TM PHE: CPT

## 2021-04-27 PROCEDURE — 15275 SKIN SUB GRAFT FACE/NK/HF/G: CPT

## 2021-04-27 NOTE — HISTORY OF PRESENT ILLNESS
[FreeTextEntry1] : Pt presents with right plantar heel ulcer that was recently infected back in late November. He was hospitalized in [facility] where he had a wound debridement. Since his previous visit he has been applying dry dressing to his right heel wound.  He continues to ambulate in heel relief darco post op shoes.  He denies N/V/Sob.

## 2021-04-27 NOTE — ASSESSMENT
[FreeTextEntry1] : Pt is 64 yo who presents w/ right heel wound\par -pt was seen and examined\par -Right foot heel wound sharply debrided using #15 blade down to the level of subQ but not beyond\par -Patient to return to erik and dsd daily, being done by son\par -Apligraft application today 4/20, and dressed w/ wound veil and allevyn pad dressing, to change outer dressing daily and apply foam.\par -RTC 2 weeks, will consider another Apligraf application pending authorization\par \par Skin substitute applied.\par Type: Apligraf\par Application number:2\par Site: Right heel Ulcer\par Wound bed debrided of bioburden and prepped for product application:\par Total units received: 44\par Total units applied:  10\par Total units wasted due to wound size 34\par Product secured with steri-strips and bolster dressing:\par Patient to f/u in 1 week\par

## 2021-04-30 LAB
25(OH)D3 SERPL-MCNC: 28.1 NG/ML
ALBUMIN SERPL ELPH-MCNC: 3.9 G/DL
ALP BLD-CCNC: 102 U/L
ALT SERPL-CCNC: 23 U/L
ANION GAP SERPL CALC-SCNC: 10 MMOL/L
APPEARANCE: CLEAR
AST SERPL-CCNC: 11 U/L
BACTERIA: NEGATIVE
BASOPHILS # BLD AUTO: 0.03 K/UL
BASOPHILS NFR BLD AUTO: 0.9 %
BILIRUB SERPL-MCNC: 0.3 MG/DL
BILIRUBIN URINE: NEGATIVE
BLOOD URINE: NORMAL
BUN SERPL-MCNC: 45 MG/DL
CALCIUM SERPL-MCNC: 8.9 MG/DL
CHLORIDE SERPL-SCNC: 108 MMOL/L
CHOLEST SERPL-MCNC: 144 MG/DL
CO2 SERPL-SCNC: 24 MMOL/L
COLOR: NORMAL
COVID-19 SPIKE DOMAIN ANTIBODY INTERPRETATION: NEGATIVE
CREAT SERPL-MCNC: 1.78 MG/DL
CREAT SPEC-SCNC: 76 MG/DL
CREAT/PROT UR: 0.5 RATIO
EOSINOPHIL # BLD AUTO: 0.1 K/UL
EOSINOPHIL NFR BLD AUTO: 3.1 %
ESTIMATED AVERAGE GLUCOSE: 131 MG/DL
EVEROLIMUS BLD-MCNC: 4.7 NG/ML
GLUCOSE QUALITATIVE U: NEGATIVE
GLUCOSE SERPL-MCNC: 111 MG/DL
HBA1C MFR BLD HPLC: 6.2 %
HCT VFR BLD CALC: 30.2 %
HDLC SERPL-MCNC: 44 MG/DL
HGB BLD-MCNC: 9.2 G/DL
HYALINE CASTS: 1 /LPF
IMM GRANULOCYTES NFR BLD AUTO: 0.3 %
KETONES URINE: NEGATIVE
LDLC SERPL CALC-MCNC: 60 MG/DL
LEUKOCYTE ESTERASE URINE: ABNORMAL
LYMPHOCYTES # BLD AUTO: 0.71 K/UL
LYMPHOCYTES NFR BLD AUTO: 21.9 %
MAGNESIUM SERPL-MCNC: 1.8 MG/DL
MAN DIFF?: NORMAL
MCHC RBC-ENTMCNC: 28.6 PG
MCHC RBC-ENTMCNC: 30.5 GM/DL
MCV RBC AUTO: 93.8 FL
MICROSCOPIC-UA: NORMAL
MONOCYTES # BLD AUTO: 0.31 K/UL
MONOCYTES NFR BLD AUTO: 9.6 %
NEUTROPHILS # BLD AUTO: 2.08 K/UL
NEUTROPHILS NFR BLD AUTO: 64.2 %
NITRITE URINE: NEGATIVE
NONHDLC SERPL-MCNC: 101 MG/DL
PH URINE: 6
PHOSPHATE SERPL-MCNC: 3.2 MG/DL
PLATELET # BLD AUTO: 165 K/UL
POTASSIUM SERPL-SCNC: 4.2 MMOL/L
PROT SERPL-MCNC: 6.1 G/DL
PROT UR-MCNC: 35 MG/DL
PROTEIN URINE: ABNORMAL
RBC # BLD: 3.22 M/UL
RBC # FLD: 14.2 %
RED BLOOD CELLS URINE: 6 /HPF
SARS-COV-2 AB SERPL IA-ACNC: 0.4 U/ML
SODIUM SERPL-SCNC: 142 MMOL/L
SPECIFIC GRAVITY URINE: 1.01
SQUAMOUS EPITHELIAL CELLS: 1 /HPF
TRIGL SERPL-MCNC: 205 MG/DL
UROBILINOGEN URINE: NORMAL
WBC # FLD AUTO: 3.24 K/UL
WHITE BLOOD CELLS URINE: 48 /HPF

## 2021-05-10 ENCOUNTER — NON-APPOINTMENT (OUTPATIENT)
Age: 66
End: 2021-05-10

## 2021-05-11 ENCOUNTER — APPOINTMENT (OUTPATIENT)
Dept: WOUND CARE | Facility: CLINIC | Age: 66
End: 2021-05-11
Payer: MEDICARE

## 2021-05-11 PROCEDURE — 15275 SKIN SUB GRAFT FACE/NK/HF/G: CPT

## 2021-05-11 PROCEDURE — 99072 ADDL SUPL MATRL&STAF TM PHE: CPT

## 2021-05-11 NOTE — HISTORY OF PRESENT ILLNESS
[FreeTextEntry1] : Pt presents with right plantar heel ulcer that was recently infected back in late November. [place] he had a wound debridement. Since his previous visit he has been applying dry dressing to his right heel wound.  He continues to ambulate in heel relief darco post op shoes.  He denies N/V/Sob. Patient ishere for apligraf application

## 2021-05-11 NOTE — ASSESSMENT
[FreeTextEntry1] : Pt is 66 yo who presents w/ right heel wound\par -pt was seen and examined\par -Right foot heel wound sharply debrided using #15 blade down to the level of subQ but not beyond\par -Apligraft application today 4/20, and dressed w/ wound veil and allevyn pad dressing, to change outer dressing daily and apply foam.\par -RTC 2 weeks, will consider another Apligraf application pending authorization\par \par Skin substitute applied.\par Type: Apligraf\par Application number:2\par Site: Right heel Ulcer\par Wound bed debrided of bioburden and prepped for product application:\par Total units received: 44\par Total units applied:  10\par Total units wasted due to wound size 34\par Product secured with steri-strips and bolster dressing:\par Patient to f/u in 1 week\par

## 2021-05-11 NOTE — ASSESSMENT
[FreeTextEntry1] : Pt is 64 yo who presents w/ right heel wound\par -pt was seen and examined\par -Right foot heel wound sharply debrided using #15 blade down to the level of subQ but not beyond\par -Apligraft application today 5/11 10 sq cm , and dressed w/ wound veil and allevyn pad dressing, to change outer dressing daily and apply foam.\par -RTC 2 weeks\par \par Skin substitute applied.\par Type: Apligraf\par Application number:2\par Site: Right heel Ulcer\par Wound bed debrided of bioburden and prepped for product application:\par Total units received: 44\par Total units applied:  10\par Total units wasted due to wound size 34\par Product secured with steri-strips and bolster dressing:\par Patient to f/u in 2 week\par

## 2021-05-19 ENCOUNTER — NON-APPOINTMENT (OUTPATIENT)
Age: 66
End: 2021-05-19

## 2021-05-24 ENCOUNTER — NON-APPOINTMENT (OUTPATIENT)
Age: 66
End: 2021-05-24

## 2021-05-24 ENCOUNTER — APPOINTMENT (OUTPATIENT)
Dept: TRANSPLANT | Facility: CLINIC | Age: 66
End: 2021-05-24

## 2021-05-24 NOTE — BEHAVIORAL HEALTH ASSESSMENT NOTE - AFFECT QUALITY
loratadine (CLARITIN) 10 MG tablet     Last Written Prescription Date:  4/22/2020  Last Fill Quantity: 90,   # refills: 2  Last Office Visit : 11/16/2020  Future Office visit:  6/7/2021    Routing refill request to provider for review/approval because:  Refer to clinic for review due to Pt is not between the ages of 3-64 years of age.           Carla Law RN  Central Triage Red Flags/Med Refills    
Depressed

## 2021-05-25 ENCOUNTER — APPOINTMENT (OUTPATIENT)
Dept: WOUND CARE | Facility: CLINIC | Age: 66
End: 2021-05-25
Payer: MEDICARE

## 2021-05-25 VITALS — BODY MASS INDEX: 32.51 KG/M2 | TEMPERATURE: 97.1 F | WEIGHT: 240 LBS | HEIGHT: 72 IN

## 2021-05-25 PROCEDURE — 99072 ADDL SUPL MATRL&STAF TM PHE: CPT

## 2021-05-25 PROCEDURE — 11042 DBRDMT SUBQ TIS 1ST 20SQCM/<: CPT

## 2021-06-03 ENCOUNTER — NON-APPOINTMENT (OUTPATIENT)
Age: 66
End: 2021-06-03

## 2021-06-03 LAB
ALBUMIN SERPL ELPH-MCNC: 3.8 G/DL
ALP BLD-CCNC: 115 U/L
ALT SERPL-CCNC: 26 U/L
ANION GAP SERPL CALC-SCNC: 11 MMOL/L
AST SERPL-CCNC: 13 U/L
BASOPHILS # BLD AUTO: 0.02 K/UL
BASOPHILS NFR BLD AUTO: 0.4 %
BILIRUB SERPL-MCNC: 0.2 MG/DL
BUN SERPL-MCNC: 43 MG/DL
CALCIUM SERPL-MCNC: 8.7 MG/DL
CHLORIDE SERPL-SCNC: 107 MMOL/L
CO2 SERPL-SCNC: 22 MMOL/L
COVID-19 SPIKE DOMAIN ANTIBODY INTERPRETATION: NEGATIVE
CREAT SERPL-MCNC: 1.85 MG/DL
EOSINOPHIL # BLD AUTO: 0.07 K/UL
EOSINOPHIL NFR BLD AUTO: 1.6 %
EVEROLIMUS BLD-MCNC: 4.8 NG/ML
GLUCOSE SERPL-MCNC: 142 MG/DL
HCT VFR BLD CALC: 29.6 %
HGB BLD-MCNC: 9.9 G/DL
IMM GRANULOCYTES NFR BLD AUTO: 0.2 %
LYMPHOCYTES # BLD AUTO: 0.89 K/UL
LYMPHOCYTES NFR BLD AUTO: 19.9 %
MAGNESIUM SERPL-MCNC: 1.8 MG/DL
MAN DIFF?: NORMAL
MCHC RBC-ENTMCNC: 28.9 PG
MCHC RBC-ENTMCNC: 33.4 GM/DL
MCV RBC AUTO: 86.5 FL
MONOCYTES # BLD AUTO: 0.48 K/UL
MONOCYTES NFR BLD AUTO: 10.7 %
NEUTROPHILS # BLD AUTO: 3 K/UL
NEUTROPHILS NFR BLD AUTO: 67.2 %
PLATELET # BLD AUTO: 156 K/UL
POTASSIUM SERPL-SCNC: 4.5 MMOL/L
PROT SERPL-MCNC: 6.1 G/DL
RBC # BLD: 3.42 M/UL
RBC # FLD: 13 %
SARS-COV-2 AB SERPL IA-ACNC: 0.4 U/ML
SODIUM SERPL-SCNC: 140 MMOL/L
WBC # FLD AUTO: 4.47 K/UL

## 2021-06-09 PROBLEM — N17.9 AKI (ACUTE KIDNEY INJURY): Status: ACTIVE | Noted: 2021-01-14

## 2021-06-10 ENCOUNTER — APPOINTMENT (OUTPATIENT)
Dept: TRANSPLANT | Facility: CLINIC | Age: 66
End: 2021-06-10
Payer: MEDICARE

## 2021-06-10 ENCOUNTER — APPOINTMENT (OUTPATIENT)
Dept: HEPATOLOGY | Facility: CLINIC | Age: 66
End: 2021-06-10

## 2021-06-10 DIAGNOSIS — N17.9 ACUTE KIDNEY FAILURE, UNSPECIFIED: ICD-10-CM

## 2021-06-10 PROCEDURE — 99072 ADDL SUPL MATRL&STAF TM PHE: CPT

## 2021-06-10 PROCEDURE — 99215 OFFICE O/P EST HI 40 MIN: CPT

## 2021-06-10 NOTE — PHYSICAL EXAM
[Alert] : alert [Clear to Auscultation] : lungs were clear to auscultation bilaterally [Normal Rate] : normal rate [Soft] : soft [No Edema] : no edema [] : right femoral palpable [Depression] : depression [Scleral Icterus] : no scleral icterus [Asterixis] : no asterixis [Hepatic Encephalopathy] : no hepatic encephalopathy [de-identified] : no shake [FreeTextEntry1] : Foot looks great!, Liver normal 3s, cr. 1.8 off of diuretics 11mos s/p oltx . Glucose to see rx Victoza 0.6ml daily x 1 week and then labs. Glucose 142. Will go with SQ injection and redo procrit, will renew., hct 28.

## 2021-06-10 NOTE — REVIEW OF SYSTEMS
[Negative] : Heme/Lymph [Diarrhea] : diarrhea [FreeTextEntry7] : Diarrhea stopped  [FreeTextEntry9] : impaired gait d/t R heel wound

## 2021-06-10 NOTE — HISTORY OF PRESENT ILLNESS
[Nonalcoholic Steatohepatitis (JEAN)] : Nonalcoholic Steatohepatitis (JEAN) [Liver] : Liver [PHS Increased Risk] : no Public Health Service increased risk [Hepatitis C (DANA+)] : no hepatitis c (DANA+) [Hepatitis B Core Ab Positive] : not hepatitis B core Ab positive [FreeTextEntry1] : 65 yr old s/p OLT 7/2/2020  \par PMHx of IDDM, HLD, obesity, HFpEF with mild LV diastolic  dysfunction, MGUS, chronic anemia with a history of duodenal ulcer as well as GAVE and duodenal AVM s/p APC (last on 10/11/19), 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 \par \par Last Hospitalization: \par 12/14/20 -12/30/20 for  transaminitis, osteomyelitis and MISA. MISA required HD on 12/19 and 12/22.  Transaminitis resolved and doppler with good perfusion. Pt was c-diff positive, completed PO vanco on 1/2/20 and completed course of meropenem for R. heel OM.  \par \par Interval Events (6/10/21): Last seen 2 months ago. Presents for follow up today. \par weight today - 240 lb. \par Received covid vaccine, however Covid ab neg. \par Following with wound care closely for heel ulcer. Great improvement\par Lasix 20mg daily (decreased from alternating 40mg and 20mg as creatinine slightly elevated). Using compression stockings. Advised to watch salt, sodium intake and elevate legs. Now dc'd and stopped.\par Immunosuppression: Currently on everolimus 3mg BID, MMF 500mg in AM and 250mg in PM and prednisone 5mg.\par \par Labs (6/3/21)\par AST 13, ALT 26, AlkP 115, TB 0.2, Everolimus 4.8. \par Cr 1.85 previously 1.78, K 4.5. Covid AB (-), Gluc 142.\par WBC 4.4, H/H 9.9/29.6, Plt 156. \par Labs (4/29/21) – Triglycerides 205, Chol 144, LDL 60 (on zetia 10mg and crestor 5mg)

## 2021-06-10 NOTE — ASSESSMENT
[FreeTextEntry1] : 65 yr old male s/p OLT on 7/2/20 for JEAN on everolimus, MMF and pred stable liver function, Cr improved 1.85 (previously 1.78)\par \par Labs (6/3/21)\par AST 13, ALT 26, AlkP 115, TB 0.2, Everolimus 4.8. \par Cr 1.85 previously 1.78, K 4.5. Covid AB (-), Gluc 142.\par WBC 4.4, H/H 9.9/29.6, Plt 156. \par Labs (4/29/21) – Triglycerides 205, Chol 144, LDL 60 (on zetia 10mg and crestor 5mg)\par \par Everolimus 3mg BID, MMF 500mg in AM and 250mg in PM and prednisone 5mg.\par \par Plan: \par -Diuretics d/c\par -Start victoza 0.6mg daily and labs 1 week after starting \par -Zoloft increased by psy to 100mg daily \par -D/C valcyte next month \par -Excellent improvement on heel wound. Dsg changed today. \par

## 2021-06-15 ENCOUNTER — APPOINTMENT (OUTPATIENT)
Dept: WOUND CARE | Facility: CLINIC | Age: 66
End: 2021-06-15
Payer: MEDICARE

## 2021-06-15 VITALS — TEMPERATURE: 96.9 F | WEIGHT: 240 LBS | BODY MASS INDEX: 32.51 KG/M2 | HEIGHT: 72 IN

## 2021-06-15 PROCEDURE — 11042 DBRDMT SUBQ TIS 1ST 20SQCM/<: CPT

## 2021-06-15 PROCEDURE — 99072 ADDL SUPL MATRL&STAF TM PHE: CPT

## 2021-06-15 NOTE — HISTORY OF PRESENT ILLNESS
[FreeTextEntry1] : Pt presents with right plantar heel ulcer that was recently infected back in late November. He was hospitalized in [facility] where he had a wound debridement. Since his previous visit he has been applying dry dressing to his right heel wound.  He continues to ambulate in heel relief darco post op shoes.  He denies N/V/Sob. Patient ishere for apligraf application

## 2021-06-15 NOTE — ASSESSMENT
[FreeTextEntry1] : Pt is 66 yo who presents w/ right heel wound, last apligraf application on 5/11/21\par -pt was seen and examined\par -Right foot heel fibrogranular wound sharply debrided using #15 blade down to the level of subQ but not beyond, no signs of infection\par -applied erik to right heel wound and DSD\par -continue with erik daily\par -RTC 2 weeks

## 2021-06-21 PROCEDURE — 99213 OFFICE O/P EST LOW 20 MIN: CPT | Mod: 95

## 2021-06-22 NOTE — ASSESSMENT
[FreeTextEntry1] : Pt is 64 yo who presents w/ right heel wound, last apligraf application on 5/11/21\par -pt was seen and examined\par -Right foot heel fibro- granular wound sharply debrided using dermal curette down to the level of subQ but not beyond, no signs of infection, wound significantly smaller\par -apply bacitracin and bandaid daily\par - rec see orthotist Paulo Marrufo for orthopedic shoes \par -RTC 2 weeks

## 2021-06-22 NOTE — HISTORY OF PRESENT ILLNESS
[FreeTextEntry1] : Pt presents with right plantar heel ulcer that was recently infected back in late November. He was hospitalized in [facility] where he had a wound debridement. Since his previous visit he has been applying dry dressing to his right heel wound.  He continues to ambulate in heel relief darco post op shoes.  He denies N/V/Sob. Patient ishere for apligraf application

## 2021-06-29 ENCOUNTER — APPOINTMENT (OUTPATIENT)
Dept: WOUND CARE | Facility: CLINIC | Age: 66
End: 2021-06-29
Payer: MEDICARE

## 2021-06-29 VITALS — TEMPERATURE: 96.6 F | BODY MASS INDEX: 32.51 KG/M2 | HEIGHT: 72 IN | WEIGHT: 240 LBS

## 2021-06-29 PROCEDURE — 11042 DBRDMT SUBQ TIS 1ST 20SQCM/<: CPT

## 2021-06-29 PROCEDURE — 99072 ADDL SUPL MATRL&STAF TM PHE: CPT

## 2021-06-29 NOTE — HISTORY OF PRESENT ILLNESS
[FreeTextEntry1] : Pt presents with right plantar heel ulcer that was recently infected back in late November. He was hospitalized in [facility] where he had a wound debridement. Since his previous visit he has been applying dry dressing to his right heel wound.  He continues to ambulate in heel relief darco post op shoes.  He denies N/V/Sob. Patient ishere for apligraf application

## 2021-06-29 NOTE — ASSESSMENT
[FreeTextEntry1] : Pt is 64 yo who presents w/ right heel wound, last apligraf application on 5/11/21\par -pt was seen and examined\par -Right foot heel fibro- granular wound sharply debrided using dermal curette down to the level of subQ but not beyond, no signs of infection, wound significantly smaller\par -apply bacitracin and bandaid daily\par - rec see orthotist Paulo Marrufo for orthopedic shoes \par -RTC 3 weeks

## 2021-06-30 NOTE — PROGRESS NOTE ADULT - ASSESSMENT
63 yo M with hx of T2DM uncontrolled due to steroids x 10 years with neuropathy and R DFU of the heel, HTN, HLD, MGUS, and decompensated JEAN cirrhosis s/p liver x-plant on 7/2/2020 admitted 6/11/2020 with confusion secondary to liver failure and encephalopathy. Endocrine consulted for glycemic control in the setting of steroid use now on Prednisone 5mg daily. abdomen

## 2021-07-01 ENCOUNTER — NON-APPOINTMENT (OUTPATIENT)
Age: 66
End: 2021-07-01

## 2021-07-20 ENCOUNTER — APPOINTMENT (OUTPATIENT)
Dept: WOUND CARE | Facility: CLINIC | Age: 66
End: 2021-07-20
Payer: MEDICARE

## 2021-07-20 VITALS — TEMPERATURE: 97.7 F | BODY MASS INDEX: 31.29 KG/M2 | WEIGHT: 231 LBS | HEIGHT: 72 IN

## 2021-07-20 PROCEDURE — XXXXX: CPT

## 2021-07-20 NOTE — CONSULT NOTE ADULT - ASSESSMENT
63 yo M PMHx of decompensated JEAN cirrhosis c/b small esophageal varices (12/2019), portal hypertensive gastropathy, ascites, hx SBP, hx hepatic encephalopathy, GAVE syndrome s/p APC, hx duodenal ulcer (5/2019), HTN, HLD, DM, HFpEF (EF 70%), recent ESBL E coli prostate abscess (3/2020 s/p 4 weeks ertapenem) presents with decompensated cirrhosis with hepatic encephalopathy 2/2 VRE E faecium UTI s/p lactulose now at baseline mental status. MICU consulted 65 yo M PMHx of decompensated JEAN cirrhosis c/b small esophageal varices (12/2019), portal hypertensive gastropathy, ascites, hx SBP, hx hepatic encephalopathy, GAVE syndrome s/p APC, hx duodenal ulcer (5/2019), HTN, HLD, DM, HFpEF (EF 70%), recent ESBL E coli prostate abscess (3/2020 s/p 4 weeks ertapenem)j, orthostatic hypotension on midodrine presents with decompensated cirrhosis with hepatic encephalopathy 2/2 VRE E faecium UTI s/p lactulose now at baseline mental status. MICU consulted for symptomatic GIB with melena.    #Melena  Pt with 1 reported episode of melena and was symptomatic while sitting and developed hypotension 90/41 that improved once pt was supine again. Based on complications of cirrhosis, he is at risk for GIB. Pt has hx orthostatic hypotension on midodrine and he has been taking midodrine.    -Would only transfuse 1u pRBC  -Check post-transfusion CBC for goal Hg >8, monitor serial CBCs  -Would place 2nd PIV 20g or larger for possible need for resuscitation  -Start PPI IV BID  -Discuss with GI and transplant hepatology about melena. They may want to make him NPO for endoscopy. Consider PPI gtt and octreotide gtt  -Continue midodrine 20mg q8  -Pt is currently hemodynamically stable and not a MICU candidate.    D/w Dr. Jorge 63 yo M PMHx of decompensated JEAN cirrhosis c/b small esophageal varices (12/2019), portal hypertensive gastropathy, ascites, hx SBP, hx hepatic encephalopathy, GAVE syndrome s/p APC, hx duodenal ulcer (5/2019), HTN, HLD, DM, HFpEF (EF 70%), recent ESBL E coli prostate abscess (3/2020 s/p 4 weeks ertapenem)j, orthostatic hypotension on midodrine presents with decompensated cirrhosis with hepatic encephalopathy 2/2 VRE E faecium UTI s/p lactulose now at baseline mental status. MICU consulted for symptomatic GIB with melena.    #Melena  Pt with 1 reported episode of melena and was symptomatic while sitting and developed hypotension 90/41 that improved once pt was supine again. Based on complications of cirrhosis, he is at risk for GIB. Pt has hx orthostatic hypotension on midodrine and he has been taking midodrine.    -Would only transfuse 1u pRBC  -Check post-transfusion CBC for goal Hg >8, monitor serial CBCs  -Would place 2nd PIV 20g or larger for possible need for resuscitation  -Start PPI IV BID  -Discuss with GI and transplant hepatology about melena. They may want to make him NPO for endoscopy. Consider PPI gtt and octreotide gtt  -Continue midodrine 20mg q8  -Pt is currently hemodynamically stable and not a MICU candidate.    D/w Dr. Jorge and primary team NP Yes

## 2021-07-21 NOTE — PATIENT PROFILE ADULT - IS THERE A SUSPICION OF ABUSE/NEGLIGENCE?
----- Message from Alvina Cabrera DO sent at 7/21/2021 10:30 AM CDT -----  Please inform pt of normal pap with no high risk hpv.  No more paps for her. MB   no

## 2021-07-29 LAB
ALBUMIN SERPL ELPH-MCNC: 3.8 G/DL
ALP BLD-CCNC: 109 U/L
ALT SERPL-CCNC: 19 U/L
ANION GAP SERPL CALC-SCNC: 12 MMOL/L
APPEARANCE: ABNORMAL
AST SERPL-CCNC: 12 U/L
BACTERIA: NEGATIVE
BASOPHILS # BLD AUTO: 0.03 K/UL
BASOPHILS NFR BLD AUTO: 0.6 %
BILIRUB SERPL-MCNC: 0.3 MG/DL
BILIRUBIN URINE: NEGATIVE
BLOOD URINE: ABNORMAL
BUN SERPL-MCNC: 37 MG/DL
CALCIUM SERPL-MCNC: 9 MG/DL
CHLORIDE SERPL-SCNC: 105 MMOL/L
CHOLEST SERPL-MCNC: 184 MG/DL
CO2 SERPL-SCNC: 21 MMOL/L
COLOR: YELLOW
COVID-19 SPIKE DOMAIN ANTIBODY INTERPRETATION: NEGATIVE
CREAT SERPL-MCNC: 1.89 MG/DL
CREAT SPEC-SCNC: 134 MG/DL
CREAT/PROT UR: 0.6 RATIO
EOSINOPHIL # BLD AUTO: 0.14 K/UL
EOSINOPHIL NFR BLD AUTO: 3 %
EVEROLIMUS BLD-MCNC: 5.1 NG/ML
GLUCOSE QUALITATIVE U: NEGATIVE
GLUCOSE SERPL-MCNC: 134 MG/DL
HCT VFR BLD CALC: 27.1 %
HDLC SERPL-MCNC: 37 MG/DL
HGB BLD-MCNC: 8.5 G/DL
HYALINE CASTS: 0 /LPF
IMM GRANULOCYTES NFR BLD AUTO: 0.2 %
KETONES URINE: NEGATIVE
LDLC SERPL CALC-MCNC: NORMAL MG/DL
LEUKOCYTE ESTERASE URINE: ABNORMAL
LYMPHOCYTES # BLD AUTO: 1.17 K/UL
LYMPHOCYTES NFR BLD AUTO: 25.2 %
MAGNESIUM SERPL-MCNC: 1.9 MG/DL
MAN DIFF?: NORMAL
MCHC RBC-ENTMCNC: 28.2 PG
MCHC RBC-ENTMCNC: 31.4 GM/DL
MCV RBC AUTO: 90 FL
MICROSCOPIC-UA: NORMAL
MONOCYTES # BLD AUTO: 0.45 K/UL
MONOCYTES NFR BLD AUTO: 9.7 %
NEUTROPHILS # BLD AUTO: 2.85 K/UL
NEUTROPHILS NFR BLD AUTO: 61.3 %
NITRITE URINE: NEGATIVE
NONHDLC SERPL-MCNC: 147 MG/DL
PH URINE: 6
PLATELET # BLD AUTO: 207 K/UL
POTASSIUM SERPL-SCNC: 4.7 MMOL/L
PROT SERPL-MCNC: 6.3 G/DL
PROT UR-MCNC: 75 MG/DL
PROTEIN URINE: ABNORMAL
RBC # BLD: 3.01 M/UL
RBC # FLD: 14.7 %
RED BLOOD CELLS URINE: 3 /HPF
SARS-COV-2 AB SERPL IA-ACNC: 0.4 U/ML
SODIUM SERPL-SCNC: 138 MMOL/L
SPECIFIC GRAVITY URINE: 1.02
SQUAMOUS EPITHELIAL CELLS: 0 /HPF
TRIGL SERPL-MCNC: 466 MG/DL
URINE COMMENTS: NORMAL
UROBILINOGEN URINE: NORMAL
WBC # FLD AUTO: 4.65 K/UL
WHITE BLOOD CELLS URINE: 458 /HPF

## 2021-07-29 RX ORDER — ROSUVASTATIN CALCIUM 20 MG/1
20 TABLET, FILM COATED ORAL
Qty: 90 | Refills: 3 | Status: ACTIVE | COMMUNITY
Start: 2021-02-04 | End: 1900-01-01

## 2021-08-02 NOTE — ED ADULT NURSE NOTE - NS ED NURSE TRANSPORT WITH
Rx requested:  Requested Prescriptions     Pending Prescriptions Disp Refills    sertraline (ZOLOFT) 100 MG tablet 30 tablet 8     Sig: Take 1 tablet by mouth daily       Last Office Visit:   2/19/2021      Last filled:  Pt requesting 90 days     Next Visit Date:  Future Appointments   Date Time Provider Emily Hanks   8/23/2021  9:30 AM Hanh Moffett MD [address] [place] [address]
IV pump

## 2021-08-03 RX ORDER — OMEGA-3/DHA/EPA/FISH OIL 300-1000MG
1000 CAPSULE ORAL TWICE DAILY
Qty: 60 | Refills: 5 | Status: DISCONTINUED | COMMUNITY
Start: 2021-07-29 | End: 2021-08-03

## 2021-08-10 PROCEDURE — 99213 OFFICE O/P EST LOW 20 MIN: CPT | Mod: 95

## 2021-08-17 NOTE — H&P ADULT - PROBLEM/PLAN-7
DISPLAY PLAN FREE TEXT

## 2021-08-19 ENCOUNTER — APPOINTMENT (OUTPATIENT)
Dept: TRANSPLANT | Facility: CLINIC | Age: 66
End: 2021-08-19
Payer: MEDICARE

## 2021-08-19 VITALS
TEMPERATURE: 97 F | HEART RATE: 70 BPM | DIASTOLIC BLOOD PRESSURE: 65 MMHG | WEIGHT: 240 LBS | SYSTOLIC BLOOD PRESSURE: 110 MMHG | RESPIRATION RATE: 12 BRPM | HEIGHT: 72 IN | OXYGEN SATURATION: 100 % | BODY MASS INDEX: 32.51 KG/M2

## 2021-08-19 LAB
ALBUMIN SERPL ELPH-MCNC: 3.8 G/DL
ALP BLD-CCNC: 104 U/L
ALT SERPL-CCNC: 30 U/L
ANION GAP SERPL CALC-SCNC: 10 MMOL/L
AST SERPL-CCNC: 26 U/L
BASOPHILS # BLD AUTO: 0.02 K/UL
BASOPHILS NFR BLD AUTO: 0.6 %
BILIRUB SERPL-MCNC: 0.2 MG/DL
BUN SERPL-MCNC: 32 MG/DL
CALCIUM SERPL-MCNC: 8.8 MG/DL
CHLORIDE SERPL-SCNC: 108 MMOL/L
CO2 SERPL-SCNC: 24 MMOL/L
CREAT SERPL-MCNC: 1.66 MG/DL
EOSINOPHIL # BLD AUTO: 0.1 K/UL
EOSINOPHIL NFR BLD AUTO: 3 %
EVEROLIMUS BLD-MCNC: 3.6 NG/ML
FERRITIN SERPL-MCNC: 317 NG/ML
FOLATE SERPL-MCNC: >20 NG/ML
GGT SERPL-CCNC: 11 U/L
GLUCOSE SERPL-MCNC: 124 MG/DL
HCT VFR BLD CALC: 27.4 %
HGB BLD-MCNC: 8.8 G/DL
IMM GRANULOCYTES NFR BLD AUTO: 0.3 %
IRON SATN MFR SERPL: 43 %
IRON SERPL-MCNC: 82 UG/DL
LYMPHOCYTES # BLD AUTO: 0.94 K/UL
LYMPHOCYTES NFR BLD AUTO: 28.4 %
MAGNESIUM SERPL-MCNC: 1.9 MG/DL
MAN DIFF?: NORMAL
MCHC RBC-ENTMCNC: 28.6 PG
MCHC RBC-ENTMCNC: 32.1 GM/DL
MCV RBC AUTO: 89 FL
MONOCYTES # BLD AUTO: 0.27 K/UL
MONOCYTES NFR BLD AUTO: 8.2 %
NEUTROPHILS # BLD AUTO: 1.97 K/UL
NEUTROPHILS NFR BLD AUTO: 59.5 %
PLATELET # BLD AUTO: 170 K/UL
POTASSIUM SERPL-SCNC: 4.2 MMOL/L
PROT SERPL-MCNC: 6.2 G/DL
RBC # BLD: 3.08 M/UL
RBC # BLD: 3.08 M/UL
RBC # FLD: 15.2 %
RETICS # AUTO: 3.8 %
RETICS AGGREG/RBC NFR: 118 K/UL
SODIUM SERPL-SCNC: 142 MMOL/L
TIBC SERPL-MCNC: 192 UG/DL
TRANSFERRIN SERPL-MCNC: 155 MG/DL
UIBC SERPL-MCNC: 110 UG/DL
VIT B12 SERPL-MCNC: 683 PG/ML
WBC # FLD AUTO: 3.31 K/UL

## 2021-08-19 PROCEDURE — 99215 OFFICE O/P EST HI 40 MIN: CPT

## 2021-08-19 NOTE — ASSESSMENT
[FreeTextEntry1] : 65 yr old male s/p OLT on 7/2/20 for JEAN on everolimus, MMF and pred stable liver function, Cr improved 1.66 (previously 1.89, 1.85, 1.78)\par \par Plan: \par Labs in 2 weeks \par

## 2021-08-19 NOTE — PLAN
[FreeTextEntry1] : donovan castaneda, increase everolimus to 4/4 and cellcept to 500 mg bid and ok for Moderna trial last dose April. Unable to acquire Victoza. Labs in 2 weeks. RTC Sept 13th for trial and check mri for follow up.

## 2021-08-19 NOTE — HISTORY OF PRESENT ILLNESS
[Nonalcoholic Steatohepatitis (JEAN)] : Nonalcoholic Steatohepatitis (JEAN) [Liver] : Liver [PHS Increased Risk] : no Public Health Service increased risk [Hepatitis C (DANA+)] : no hepatitis c (DANA+) [Hepatitis B Core Ab Positive] : not hepatitis B core Ab positive [FreeTextEntry1] : 65 yr old s/p OLT 7/2/2020  \par \par PMHx of IDDM, HLD, obesity, HFpEF with mild LV diastolic  dysfunction, MGUS, chronic anemia with a history of duodenal ulcer as well as GAVE and duodenal AVM s/p APC (last on 10/11/19), 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 \par \par Last Hospitalization: \par 12/14/20 -12/30/20 for  transaminitis, osteomyelitis and MISA. MISA required HD on 12/19 and 12/22.  Transaminitis resolved and doppler with good perfusion. Pt was c-diff positive, completed PO vanco on 1/2/20 and completed course of meropenem for R. heel OM.  \par \par Interval Events (8/19/21) -1 year post transplant, looks well, Foot wound healed. \par Labs reviewed LFTs stable. anemic but stable. Not on procrit not covered by insurance, unable to afford. \par Discussed getting booster covid vaccine, pt has failed to develop a response. James Ab have been negative. \par \par (6/10/21): Last seen 2 months ago. Presents for follow up today. \par weight today - 240 lb. \par Received covid vaccine, however Covid ab neg. \par Following with wound care closely for heel ulcer. Great improvement\par Lasix 20mg daily (decreased from alternating 40mg and 20mg as creatinine slightly elevated). Using compression stockings. Advised to watch salt, sodium intake and elevate legs. Now dc'd and stopped.\par Immunosuppression: Currently on everolimus 3mg BID, MMF 500mg in AM and 250mg in PM and prednisone 5mg.\par \par Labs (8/18/21)\par Na 142, K 4.2 \par Cr 1.66 (previously 1.89/1.85/1.78) \par TB 0.2, AST 26, ALT 30, \par GGT 11\par Mag 1.9 \par WBC 3.31, H/H 8.8/27.4,   \par Everolimus 3.6

## 2021-08-19 NOTE — PHYSICAL EXAM
[Alert] : alert [Clear to Auscultation] : lungs were clear to auscultation bilaterally [Normal Rate] : normal rate [Soft] : soft [No Edema] : no edema [] : right femoral palpable [Depression] : depression [Scleral Icterus] : no scleral icterus [Asterixis] : no asterixis [Hepatic Encephalopathy] : no hepatic encephalopathy [de-identified] : liver barely palpable, ? fluid within abd. [de-identified] : Healed  [FreeTextEntry1] : Multiple scratch marks to arms and legs bilateral from dog foot healed [de-identified] : no shake

## 2021-09-13 ENCOUNTER — APPOINTMENT (OUTPATIENT)
Dept: TRANSPLANT | Facility: CLINIC | Age: 66
End: 2021-09-13
Payer: MEDICARE

## 2021-09-13 VITALS
BODY MASS INDEX: 32.01 KG/M2 | OXYGEN SATURATION: 98 % | WEIGHT: 236 LBS | TEMPERATURE: 98.4 F | SYSTOLIC BLOOD PRESSURE: 127 MMHG | DIASTOLIC BLOOD PRESSURE: 76 MMHG | HEART RATE: 80 BPM | RESPIRATION RATE: 16 BRPM

## 2021-09-13 PROCEDURE — 99215 OFFICE O/P EST HI 40 MIN: CPT

## 2021-09-13 NOTE — PLAN
[FreeTextEntry1] : Check new labs today, on cellcept 500mg bid and zortress 4mg bid, check lipids and covid antibody, take 3rd shot of pfeizer.

## 2021-09-13 NOTE — ASSESSMENT
[FreeTextEntry1] : 65 yr old male s/p OLT on 7/2/20 for JEAN on everolimus, MMF and pred stable liver function, Cr stable btw 1.6-1.8. BG well controlled, he is exercising. Ambulating with cane for stability.  \par Immuno: Everolimus 4/4,  BId, Pred 5 \par \par Plan: \par -Will check labs today

## 2021-09-13 NOTE — HISTORY OF PRESENT ILLNESS
[Nonalcoholic Steatohepatitis (JEAN)] : Nonalcoholic Steatohepatitis (JEAN) [Liver] : Liver [PHS Increased Risk] : no Public Health Service increased risk [Hepatitis C (DANA+)] : no hepatitis c (DANA+) [Hepatitis B Core Ab Positive] : not hepatitis B core Ab positive [FreeTextEntry1] : 65 yr old s/p OLT 7/2/2020  \par \par PMHx of IDDM, HLD, obesity, HFpEF with mild LV diastolic  dysfunction, MGUS, chronic anemia with a history of duodenal ulcer as well as GAVE and duodenal AVM s/p APC (last on 10/11/19), 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 \par \par Last Hospitalization: \par 12/14/20 -12/30/20 for  transaminitis, osteomyelitis and MISA. MISA required HD on 12/19 and 12/22.  Transaminitis resolved and doppler with good perfusion. Pt was c-diff positive, completed PO vanco on 1/2/20 and completed course of meropenem for R. heel OM.  \par \par Interval Events: (9/13/21)-Weight today 236 (4 lb weight loss since last visit) \par Blood glucose reading at home between 100-160 taking lantus 12 and sliding scale with meals \par Recieved Pfizer Vaccine -has not developed AB \par Current Immuno everolimus 4mg BID, MMF 500mg BID and prednisone 5mg.\par LFTs stable \par \par (8/19/21) -1 year post transplant, looks well, Foot wound healed. \par Labs reviewed LFTs stable. anemic but stable. Not on procrit not covered by insurance, unable to afford. \par Discussed getting booster covid vaccine, pt has failed to develop a response. James Ab have been negative. \par \par (6/10/21): Last seen 2 months ago. Presents for follow up today. \par weight today - 240lb  \par Received covid (Pfizer) vaccine, however Covid ab neg. \par Following with wound care closely for heel ulcer. Great improvement\par Lasix 20mg daily (decreased from alternating 40mg and 20mg as creatinine slightly elevated). Using compression stockings. Advised to watch salt, sodium intake and elevate legs. Now dc'd and stopped.\par \par Immunosuppression: Currently on everolimus 4mg BID, MMF 500mg BID and prednisone 5mg.\par \par Labs (8/18/21)\par Na 142, K 4.2 \par Cr 1.66 (previously 1.89/1.85/1.78) \par TB 0.2, AST 26, ALT 30, \par GGT 11\par Mag 1.9 \par WBC 3.31, H/H 8.8/27.4,   \par Everolimus 3.6

## 2021-09-13 NOTE — REVIEW OF SYSTEMS
[Negative] : Heme/Lymph [Diarrhea] : diarrhea [Itching] : no itching [Skin Rash] : no skin rash [FreeTextEntry9] : right foot wood healed  [de-identified] : reports easy bruising

## 2021-09-13 NOTE — PHYSICAL EXAM
[Alert] : alert [Clear to Auscultation] : lungs were clear to auscultation bilaterally [Normal Rate] : normal rate [Soft] : soft [No Edema] : no edema [] : right femoral palpable [Scleral Icterus] : no scleral icterus [Ascites Fluid Wave] : no ascites fluid wave [Abdominal Ascites] : no abdominal ascites [Spider Angioma] : no spider angioma [Asterixis] : no asterixis [Hepatic Encephalopathy] : no hepatic encephalopathy [de-identified] : well healed [de-identified] : Healed  no hernia [FreeTextEntry1] :  1+ le Edema R>L  [de-identified] : no shake

## 2021-09-17 LAB
ALBUMIN SERPL ELPH-MCNC: 3.9 G/DL
ALP BLD-CCNC: 136 U/L
ALT SERPL-CCNC: 55 U/L
ANION GAP SERPL CALC-SCNC: 13 MMOL/L
AST SERPL-CCNC: 31 U/L
BASOPHILS # BLD AUTO: 0.03 K/UL
BASOPHILS NFR BLD AUTO: 0.7 %
BILIRUB SERPL-MCNC: 0.2 MG/DL
BUN SERPL-MCNC: 42 MG/DL
CALCIUM SERPL-MCNC: 9 MG/DL
CHLORIDE SERPL-SCNC: 104 MMOL/L
CHOLEST SERPL-MCNC: 168 MG/DL
CO2 SERPL-SCNC: 21 MMOL/L
COVID-19 SPIKE DOMAIN ANTIBODY INTERPRETATION: NEGATIVE
CREAT SERPL-MCNC: 1.84 MG/DL
EOSINOPHIL # BLD AUTO: 0.06 K/UL
EOSINOPHIL NFR BLD AUTO: 1.5 %
ESTIMATED AVERAGE GLUCOSE: 197 MG/DL
EVEROLIMUS BLD-MCNC: 8.6 NG/ML
GGT SERPL-CCNC: 15 U/L
GLUCOSE SERPL-MCNC: 297 MG/DL
HBA1C MFR BLD HPLC: 8.5 %
HBV CORE IGG+IGM SER QL: NONREACTIVE
HBV DNA # SERPL NAA+PROBE: NOT DETECTED IU/ML
HBV SURFACE AB SER QL: NONREACTIVE
HBV SURFACE AB SERPL IA-ACNC: 4.5 MIU/ML
HBV SURFACE AG SER QL: NONREACTIVE
HCT VFR BLD CALC: 27.3 %
HCV AB SER QL: NONREACTIVE
HCV RNA SERPL NAA DL=5-ACNC: NOT DETECTED IU/ML
HCV RNA SERPL NAA+PROBE-LOG IU: NOT DETECTED LOG10IU/ML
HCV S/CO RATIO: 0.28 S/CO
HDLC SERPL-MCNC: 37 MG/DL
HEPB DNA PCR LOG: NOT DETECTED LOG10IU/ML
HGB BLD-MCNC: 9.1 G/DL
HIV1+2 AB SPEC QL IA.RAPID: NONREACTIVE
IMM GRANULOCYTES NFR BLD AUTO: 0.5 %
LDLC SERPL CALC-MCNC: NORMAL MG/DL
LYMPHOCYTES # BLD AUTO: 1.01 K/UL
LYMPHOCYTES NFR BLD AUTO: 24.7 %
MAN DIFF?: NORMAL
MCHC RBC-ENTMCNC: 28.5 PG
MCHC RBC-ENTMCNC: 33.3 GM/DL
MCV RBC AUTO: 85.6 FL
MONOCYTES # BLD AUTO: 0.43 K/UL
MONOCYTES NFR BLD AUTO: 10.5 %
NEUTROPHILS # BLD AUTO: 2.54 K/UL
NEUTROPHILS NFR BLD AUTO: 62.1 %
NONHDLC SERPL-MCNC: 132 MG/DL
PLATELET # BLD AUTO: 162 K/UL
POTASSIUM SERPL-SCNC: 4.4 MMOL/L
PROT SERPL-MCNC: 6.5 G/DL
RBC # BLD: 3.19 M/UL
RBC # FLD: 13.7 %
SARS-COV-2 AB SERPL IA-ACNC: 0.4 U/ML
SODIUM SERPL-SCNC: 138 MMOL/L
TRIGL SERPL-MCNC: 614 MG/DL
WBC # FLD AUTO: 4.09 K/UL

## 2021-09-18 ENCOUNTER — TRANSCRIPTION ENCOUNTER (OUTPATIENT)
Age: 66
End: 2021-09-18

## 2021-10-11 ENCOUNTER — APPOINTMENT (OUTPATIENT)
Dept: TRANSPLANT | Facility: CLINIC | Age: 66
End: 2021-10-11

## 2021-10-12 NOTE — DISCHARGE NOTE ADULT - NS MD DC FALL RISK RISK
Per patient myChart message:  To: PAIN NURSE      From: Jamison Breen      Created: 10/12/2021 11:45 AM      i my surgery got canceled till November because of no beds available.  I've been off suboxone and don't want to restart to only have to ween off again.  I need something to help with the pain , it's been horrible.        4/21/21: last visit with JUANITA Ramon CNP  _________________________________    Mychart sent to pt:  From: Cha Molina RN      Created: 10/12/2021 1:14 PM      Devante Swift,  Sorry to hear this!  You will need to make an appointment with JUANITA Ramon CNP to discuss. You can call [phone].          Will keep encounter open for a couple of days in anticipation of patient call back.    Cha RN-BSN  [facility]       For information on Fall & Injury Prevention, visit [email or web address]

## 2021-10-19 LAB
25(OH)D3 SERPL-MCNC: 22.7 NG/ML
ALBUMIN SERPL ELPH-MCNC: 3.6 G/DL
ALP BLD-CCNC: 118 U/L
ALT SERPL-CCNC: 55 U/L
ANION GAP SERPL CALC-SCNC: 13 MMOL/L
AST SERPL-CCNC: 26 U/L
BASOPHILS # BLD AUTO: 0.02 K/UL
BASOPHILS NFR BLD AUTO: 0.4 %
BILIRUB SERPL-MCNC: 0.3 MG/DL
BUN SERPL-MCNC: 35 MG/DL
CALCIUM SERPL-MCNC: 9 MG/DL
CHLORIDE SERPL-SCNC: 107 MMOL/L
CHOLEST SERPL-MCNC: 165 MG/DL
CO2 SERPL-SCNC: 18 MMOL/L
COVID-19 SPIKE DOMAIN ANTIBODY INTERPRETATION: NEGATIVE
CREAT SERPL-MCNC: 2.09 MG/DL
EOSINOPHIL # BLD AUTO: 0.06 K/UL
EOSINOPHIL NFR BLD AUTO: 1.3 %
EVEROLIMUS BLD-MCNC: 8.3 NG/ML
GGT SERPL-CCNC: 19 U/L
GLUCOSE SERPL-MCNC: 151 MG/DL
HCT VFR BLD CALC: 28.2 %
HDLC SERPL-MCNC: 46 MG/DL
HGB BLD-MCNC: 8.9 G/DL
IMM GRANULOCYTES NFR BLD AUTO: 0.2 %
LDLC SERPL CALC-MCNC: 43 MG/DL
LYMPHOCYTES # BLD AUTO: 1.11 K/UL
LYMPHOCYTES NFR BLD AUTO: 24.3 %
MAGNESIUM SERPL-MCNC: 1.7 MG/DL
MAN DIFF?: NORMAL
MCHC RBC-ENTMCNC: 27.5 PG
MCHC RBC-ENTMCNC: 31.6 GM/DL
MCV RBC AUTO: 87 FL
MONOCYTES # BLD AUTO: 0.42 K/UL
MONOCYTES NFR BLD AUTO: 9.2 %
NEUTROPHILS # BLD AUTO: 2.95 K/UL
NEUTROPHILS NFR BLD AUTO: 64.6 %
NONHDLC SERPL-MCNC: 118 MG/DL
PLATELET # BLD AUTO: 181 K/UL
POTASSIUM SERPL-SCNC: 4.7 MMOL/L
PROT SERPL-MCNC: 6.2 G/DL
RBC # BLD: 3.24 M/UL
RBC # FLD: 13.2 %
SARS-COV-2 AB SERPL IA-ACNC: 0.4 U/ML
SODIUM SERPL-SCNC: 138 MMOL/L
TRIGL SERPL-MCNC: 376 MG/DL
WBC # FLD AUTO: 4.57 K/UL

## 2021-10-19 RX ORDER — FUROSEMIDE 40 MG/1
40 TABLET ORAL DAILY
Qty: 30 | Refills: 2 | Status: DISCONTINUED | COMMUNITY
Start: 2021-04-22 | End: 2021-10-19

## 2021-10-19 RX ORDER — VALGANCICLOVIR HYDROCHLORIDE 450 MG/1
450 TABLET ORAL EVERY OTHER DAY
Qty: 15 | Refills: 5 | Status: DISCONTINUED | COMMUNITY
Start: 2020-07-21 | End: 2021-10-19

## 2021-10-19 RX ORDER — COLLAGENASE SANTYL 250 [ARB'U]/G
250 OINTMENT TOPICAL DAILY
Qty: 1 | Refills: 2 | Status: DISCONTINUED | COMMUNITY
Start: 2021-01-19 | End: 2021-10-19

## 2021-10-27 NOTE — BEHAVIORAL HEALTH ASSESSMENT NOTE - VIOLENCE RISK FACTORS:
PSYCHIATRY FOLLOW-UP NOTE      Chief Complaint   Patient presents with   • Follow-Up     alcohol use disorder, anxiety, depression       History Of Present Illness:  Donna Morales is a 42 y.o. female with hypertension, alcohol use disorder, anxiety disorder, chronic insomnia comes in today for follow up, was last seen 6 weeks ago.  She was seen along with her mother today.  She was recently hospitalized for fluid and electrolyte replacement and also treated for alcohol withdrawal.  She was discharged on 10/25 and since that she has been back home she has had 3.5 ounce of wine in the last 2 days.  She is having withdrawal symptoms including diarrhea, anxiety and tremors.  She was visibly anxious and shaky at her appointment.  She is also having troubles with sleep even with Trazodone.  She mentions that she is having broken sleep throughout the night.  She continues to have cravings for alcohol.  She is still struggling with anxiety and depression and the stress of not being able to work.  Her mother and grandmother have been really supportive.  She is feeling better in regards to nausea and appetite.  She denies having thoughts of wanting to hurt herself.    Social History:   She and her boyfriend are taking a break from the relationship, lives alone in [place], good support from mother and grandmother and both live in [facility], unemployed.    Substance Use:  Alcohol - See HPI for details  Nicotine - Denies  Cannabis - Denies  Illicit drugs - Denies    Past Medication Trials:  Ativan, Topamax for alcohol use disorder (ineffective), Antabuse 500 mg, Acamprosate, Neurontin, Hydroxyzine    Medications:  Current Outpatient Medications   Medication Sig Dispense Refill   • LORazepam (ATIVAN) 2 MG tablet Take 1 Tablet by mouth every 8 hours for 3 days, THEN 1 Tablet every 12 hours for 3 days, THEN 0.5 Tablets every 12 hours for 3 days, THEN 0.5 Tablets every day for 3 days. 20 Tablet 0   • fluoxetine (PROZAC) 40 MG  "capsule Take 1 Capsule by mouth every day. 90 Capsule 0   • naltrexone (DEPADE) 50 MG Tab Take 1 Tablet by mouth every day. 30 Tablet 0   • traZODone (DESYREL) 150 MG Tab Take 1-2 Tablets by mouth at bedtime. 180 Tablet 0   • busPIRone (BUSPAR) 15 MG tablet Take 1 Tablet by mouth 2 times a day. 180 Tablet 0   • loperamide (IMODIUM) 2 MG Cap Take 1 Capsule by mouth 4 times a day as needed for Diarrhea for up to 15 days. 60 Capsule 0   • magnesium oxide (MAG-OX) 400 MG Tab tablet Take 1 Tablet by mouth 2 times a day for 30 days. 30 Tablet 0   • Melatonin 10 MG Tab Take 20 mg by mouth at bedtime.       No current facility-administered medications for this visit.       Review Of Systems:    Constitutional - Positive for fatigue  Psychiatric - Positive for anxiety, depression, alcohol use, poor sleep    Physical Examination:  Vital signs: Ht 1.626 m (5' 4\")   Wt 73.5 kg (162 lb)   BMI 27.81 kg/m²     Musculoskeletal: No abnormal movements.     Mental Status Evaluation:   General: Young black female, tearful, dressed in casual attire, good grooming and hygiene, in no apparent distress, calm and cooperative, good eye contact, no psychomotor retardation  Orientation: Alert and oriented to person, place and time  Recent and remote memory: Grossly intact  Attention span and concentration: Grossly intact  Speech: Spontaneous, normal rate, rhythm and tone  Thought Process: Linear, logical and goal directed  Thought Content: Denies suicidal or homicidal ideations, intent or plan  Perception: No delusions noted  Associations: Intact  Language: Appropriate  Fund of knowledge and vocabulary: Grossly adequate  Mood: \"okay\"  Affect: Dysphoric, mood congruent  Insight: Good  Judgment: Good    Depression screening:  Depression Screen (PHQ-2/PHQ-9) 6/2/2021 9/16/2021 10/21/2021   PHQ-2 Total Score 0 - 2   PHQ-2 Total Score - 5 -   PHQ-9 Total Score - - 8   PHQ-9 Total Score - 20 -     Interpretation of PHQ-9 Total Score   Score " Severity   1-4 No Depression   5-9 Mild Depression   10-14 Moderate Depression   15-19 Moderately Severe Depression   20-27 Severe Depression    Medical Records/Labs/Diagnostic Tests Reviewed:  [place] PDMP records - no prescribed controlled medications found in the last 2 years      Impression:  1.  Adjustment disorder with anxiety and depression (unemployed, relationship stress) - stable   2.  Alcohol use disorder, severe - stable  3.  Unspecified anxiety disorder - stable  4.  Chronic insomnia secondary to depression, anxiety, alcohol use - stable  5.  Alcohol withdrawal - stable    Plan:  1.  Referral placed for intensive outpatient program.  Discussed that she will need to stay sober through the program and she is willing to give it a try.  She is worried about alcohol withdrawal symptoms and she appeared visibly anxious and tremulous during her appointment.  She is agreeable to Ativan taper to slowly taper her off alcohol and avoid alcohol withdrawal seizure and possible hospitalization.  2.  Start Ativan 2 mg every 8 hours for 3 days followed by 2 mg every 12 hours for 3 days followed by 1 mg every 12 hours for 3 days followed by 1 mg daily for 3 days and then discontinue.  She was advised not to combine Ativan and alcohol.  Her mother will be monitoring her medication use.  3.  Increase Prozac to 40 mg daily for depression and anxiety  4.  Restart Naltrexone 50 mg daily for alcohol use disorder  5.  Continue Buspirone 15 mg twice daily for anxiety and depression augmentation  6.  Continue Trazodone 150-300 mg at bedtime for sleep  7.  Recommend her to start using over-the-counter Vitamin B1 100 mg daily  8.  I have also advised her to set up an appointment with PCP to follow-up on her hospitalization.  9.  Provided supportive psychotherapy (> 16 minutes).  Provided support in regards to struggles with alcohol.  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   Discussed importance of sobriety since that she has lost her relationship and her job in the last few months and alcohol is clearly affecting her physical and mental health.    Return to clinic in 4-6 weeks or sooner if symptoms worsen    The proposed treatment plan was discussed with the patient who was provided the opportunity to ask questions and make suggestions regarding alternative treatment. Patient verbalized understanding and expressed agreement with the plan.     Sujey Dunbar M.D.  10/27/21    This note was created using voice recognition software (Dragon). The accuracy of the dictation is limited by the abilities of the software. I have reviewed the note prior to signing, however some errors in grammar and context are still possible. If you have any questions related to this note please do not hesitate to contact our office.    None Known

## 2021-11-02 NOTE — ED PROVIDER NOTE - ADMIT DISPOSITION PRESENT ON ADMISSION SEPSIS
Liu Jalloh is a 43 No

## 2021-11-19 ENCOUNTER — EMERGENCY (EMERGENCY)
Facility: HOSPITAL | Age: 66
LOS: 1 days | Discharge: HOME | End: 2021-11-19
Attending: EMERGENCY MEDICINE
Payer: MEDICARE

## 2021-11-19 VITALS
WEIGHT: 212.97 LBS | HEIGHT: 73 IN | SYSTOLIC BLOOD PRESSURE: 109 MMHG | OXYGEN SATURATION: 99 % | DIASTOLIC BLOOD PRESSURE: 62 MMHG | HEART RATE: 91 BPM | RESPIRATION RATE: 18 BRPM | TEMPERATURE: 98 F

## 2021-11-19 DIAGNOSIS — Z90.49 ACQUIRED ABSENCE OF OTHER SPECIFIED PARTS OF DIGESTIVE TRACT: Chronic | ICD-10-CM

## 2021-11-19 LAB
ALBUMIN SERPL ELPH-MCNC: 3.4 G/DL — SIGNIFICANT CHANGE UP (ref 3.3–5)
ALBUMIN SERPL ELPH-MCNC: 3.6 G/DL
ALP BLD-CCNC: 169 U/L
ALP SERPL-CCNC: 163 U/L — HIGH (ref 40–120)
ALT FLD-CCNC: 91 U/L — HIGH (ref 10–45)
ALT SERPL-CCNC: 86 U/L
ANION GAP SERPL CALC-SCNC: 16 MMOL/L — SIGNIFICANT CHANGE UP (ref 5–17)
ANION GAP SERPL CALC-SCNC: 17 MMOL/L
APTT BLD: 26.2 SEC — LOW (ref 27.5–35.5)
AST SERPL-CCNC: 37 U/L
AST SERPL-CCNC: 41 U/L — HIGH (ref 10–40)
BASE EXCESS BLDV CALC-SCNC: -3.4 MMOL/L — LOW (ref -2–2)
BASOPHILS # BLD AUTO: 0.03 K/UL
BASOPHILS # BLD AUTO: 0.09 K/UL — SIGNIFICANT CHANGE UP (ref 0–0.2)
BASOPHILS NFR BLD AUTO: 0.4 %
BASOPHILS NFR BLD AUTO: 1.7 % — SIGNIFICANT CHANGE UP (ref 0–2)
BILIRUB SERPL-MCNC: 0.3 MG/DL
BILIRUB SERPL-MCNC: 0.3 MG/DL — SIGNIFICANT CHANGE UP (ref 0.2–1.2)
BUN SERPL-MCNC: 43 MG/DL — HIGH (ref 7–23)
BUN SERPL-MCNC: 46 MG/DL
CA-I SERPL-SCNC: 1.24 MMOL/L — SIGNIFICANT CHANGE UP (ref 1.15–1.33)
CALCIUM SERPL-MCNC: 9.2 MG/DL
CALCIUM SERPL-MCNC: 9.2 MG/DL — SIGNIFICANT CHANGE UP (ref 8.4–10.5)
CHLORIDE BLDV-SCNC: 106 MMOL/L — SIGNIFICANT CHANGE UP (ref 96–108)
CHLORIDE SERPL-SCNC: 100 MMOL/L — SIGNIFICANT CHANGE UP (ref 96–108)
CHLORIDE SERPL-SCNC: 102 MMOL/L
CMV DNA SPEC QL NAA+PROBE: 293 IU/ML
CO2 BLDV-SCNC: 23 MMOL/L — SIGNIFICANT CHANGE UP (ref 22–26)
CO2 SERPL-SCNC: 18 MMOL/L — LOW (ref 22–31)
CO2 SERPL-SCNC: 20 MMOL/L
CREAT SERPL-MCNC: 2.02 MG/DL — HIGH (ref 0.5–1.3)
CREAT SERPL-MCNC: 2.03 MG/DL
EBV DNA SERPL NAA+PROBE-ACNC: NOT DETECTED IU/ML
ELLIPTOCYTES BLD QL SMEAR: SLIGHT — SIGNIFICANT CHANGE UP
EOSINOPHIL # BLD AUTO: 0 K/UL — SIGNIFICANT CHANGE UP (ref 0–0.5)
EOSINOPHIL # BLD AUTO: 0.06 K/UL
EOSINOPHIL NFR BLD AUTO: 0 % — SIGNIFICANT CHANGE UP (ref 0–6)
EOSINOPHIL NFR BLD AUTO: 0.8 %
GAS PNL BLDV: 137 MMOL/L — SIGNIFICANT CHANGE UP (ref 136–145)
GAS PNL BLDV: SIGNIFICANT CHANGE UP
GAS PNL BLDV: SIGNIFICANT CHANGE UP
GGT SERPL-CCNC: 37 U/L
GLUCOSE BLDV-MCNC: 239 MG/DL — HIGH (ref 70–99)
GLUCOSE SERPL-MCNC: 385 MG/DL
GLUCOSE SERPL-MCNC: 422 MG/DL — HIGH (ref 70–99)
HCO3 BLDV-SCNC: 22 MMOL/L — SIGNIFICANT CHANGE UP (ref 22–29)
HCT VFR BLD CALC: 26.9 % — LOW (ref 39–50)
HCT VFR BLD CALC: 27.3 %
HCT VFR BLDA CALC: 22 % — LOW (ref 39–51)
HGB BLD CALC-MCNC: 7.4 G/DL — LOW (ref 12.6–17.4)
HGB BLD-MCNC: 8.4 G/DL
HGB BLD-MCNC: 8.7 G/DL — LOW (ref 13–17)
IMM GRANULOCYTES NFR BLD AUTO: 0.7 %
INR BLD: 1.13 RATIO — SIGNIFICANT CHANGE UP (ref 0.88–1.16)
LACTATE BLDV-MCNC: 1.2 MMOL/L — SIGNIFICANT CHANGE UP (ref 0.7–2)
LYMPHOCYTES # BLD AUTO: 0.63 K/UL — LOW (ref 1–3.3)
LYMPHOCYTES # BLD AUTO: 0.86 K/UL
LYMPHOCYTES # BLD AUTO: 11.3 % — LOW (ref 13–44)
LYMPHOCYTES NFR BLD AUTO: 11.4 %
MAGNESIUM SERPL-MCNC: 1.8 MG/DL
MAN DIFF?: NORMAL
MANUAL SMEAR VERIFICATION: SIGNIFICANT CHANGE UP
MCHC RBC-ENTMCNC: 26.2 PG
MCHC RBC-ENTMCNC: 26.3 PG — LOW (ref 27–34)
MCHC RBC-ENTMCNC: 30.8 GM/DL
MCHC RBC-ENTMCNC: 32.3 GM/DL — SIGNIFICANT CHANGE UP (ref 32–36)
MCV RBC AUTO: 81.3 FL — SIGNIFICANT CHANGE UP (ref 80–100)
MCV RBC AUTO: 85 FL
MONOCYTES # BLD AUTO: 0.14 K/UL — SIGNIFICANT CHANGE UP (ref 0–0.9)
MONOCYTES # BLD AUTO: 0.5 K/UL
MONOCYTES NFR BLD AUTO: 2.6 % — SIGNIFICANT CHANGE UP (ref 2–14)
MONOCYTES NFR BLD AUTO: 6.6 %
NEUTROPHILS # BLD AUTO: 4.69 K/UL — SIGNIFICANT CHANGE UP (ref 1.8–7.4)
NEUTROPHILS # BLD AUTO: 6.03 K/UL
NEUTROPHILS NFR BLD AUTO: 80.1 %
NEUTROPHILS NFR BLD AUTO: 84.4 % — HIGH (ref 43–77)
PCO2 BLDV: 41 MMHG — LOW (ref 42–55)
PH BLDV: 7.34 — SIGNIFICANT CHANGE UP (ref 7.32–7.43)
PLAT MORPH BLD: NORMAL — SIGNIFICANT CHANGE UP
PLATELET # BLD AUTO: 226 K/UL — SIGNIFICANT CHANGE UP (ref 150–400)
PLATELET # BLD AUTO: 260 K/UL
PO2 BLDV: 29 MMHG — SIGNIFICANT CHANGE UP (ref 25–45)
POLYCHROMASIA BLD QL SMEAR: SLIGHT — SIGNIFICANT CHANGE UP
POTASSIUM BLDV-SCNC: 3.8 MMOL/L — SIGNIFICANT CHANGE UP (ref 3.5–5.1)
POTASSIUM SERPL-MCNC: 4.3 MMOL/L — SIGNIFICANT CHANGE UP (ref 3.5–5.3)
POTASSIUM SERPL-SCNC: 4.3 MMOL/L — SIGNIFICANT CHANGE UP (ref 3.5–5.3)
POTASSIUM SERPL-SCNC: 5 MMOL/L
PROT SERPL-MCNC: 6.5 G/DL
PROT SERPL-MCNC: 7.2 G/DL — SIGNIFICANT CHANGE UP (ref 6–8.3)
PROTHROM AB SERPL-ACNC: 13.5 SEC — SIGNIFICANT CHANGE UP (ref 10.6–13.6)
RAPID RVP RESULT: SIGNIFICANT CHANGE UP
RBC # BLD: 3.21 M/UL
RBC # BLD: 3.31 M/UL — LOW (ref 4.2–5.8)
RBC # FLD: 13.2 %
RBC # FLD: 13.2 % — SIGNIFICANT CHANGE UP (ref 10.3–14.5)
RBC BLD AUTO: SIGNIFICANT CHANGE UP
S PYO AG SPEC QL IA: NEGATIVE — SIGNIFICANT CHANGE UP
SAO2 % BLDV: 47.8 % — LOW (ref 67–88)
SARS-COV-2 RNA SPEC QL NAA+PROBE: SIGNIFICANT CHANGE UP
SODIUM SERPL-SCNC: 134 MMOL/L — LOW (ref 135–145)
SODIUM SERPL-SCNC: 139 MMOL/L
WBC # BLD: 5.56 K/UL — SIGNIFICANT CHANGE UP (ref 3.8–10.5)
WBC # FLD AUTO: 5.56 K/UL — SIGNIFICANT CHANGE UP (ref 3.8–10.5)
WBC # FLD AUTO: 7.53 K/UL

## 2021-11-19 PROCEDURE — 99284 EMERGENCY DEPT VISIT MOD MDM: CPT | Mod: 25

## 2021-11-19 PROCEDURE — 84484 ASSAY OF TROPONIN QUANT: CPT

## 2021-11-19 PROCEDURE — 87880 STREP A ASSAY W/OPTIC: CPT

## 2021-11-19 PROCEDURE — 82435 ASSAY OF BLOOD CHLORIDE: CPT

## 2021-11-19 PROCEDURE — 82962 GLUCOSE BLOOD TEST: CPT

## 2021-11-19 PROCEDURE — 85025 COMPLETE CBC W/AUTO DIFF WBC: CPT

## 2021-11-19 PROCEDURE — 93010 ELECTROCARDIOGRAM REPORT: CPT

## 2021-11-19 PROCEDURE — 82330 ASSAY OF CALCIUM: CPT

## 2021-11-19 PROCEDURE — 87086 URINE CULTURE/COLONY COUNT: CPT

## 2021-11-19 PROCEDURE — 85018 HEMOGLOBIN: CPT

## 2021-11-19 PROCEDURE — 83605 ASSAY OF LACTIC ACID: CPT

## 2021-11-19 PROCEDURE — 85730 THROMBOPLASTIN TIME PARTIAL: CPT

## 2021-11-19 PROCEDURE — 99284 EMERGENCY DEPT VISIT MOD MDM: CPT

## 2021-11-19 PROCEDURE — 0225U NFCT DS DNA&RNA 21 SARSCOV2: CPT

## 2021-11-19 PROCEDURE — 87081 CULTURE SCREEN ONLY: CPT

## 2021-11-19 PROCEDURE — 81001 URINALYSIS AUTO W/SCOPE: CPT

## 2021-11-19 PROCEDURE — 82947 ASSAY GLUCOSE BLOOD QUANT: CPT

## 2021-11-19 PROCEDURE — 99285 EMERGENCY DEPT VISIT HI MDM: CPT

## 2021-11-19 PROCEDURE — 82565 ASSAY OF CREATININE: CPT

## 2021-11-19 PROCEDURE — 93005 ELECTROCARDIOGRAM TRACING: CPT

## 2021-11-19 PROCEDURE — 82010 KETONE BODYS QUAN: CPT

## 2021-11-19 PROCEDURE — 84295 ASSAY OF SERUM SODIUM: CPT

## 2021-11-19 PROCEDURE — 82803 BLOOD GASES ANY COMBINATION: CPT

## 2021-11-19 PROCEDURE — 99283 EMERGENCY DEPT VISIT LOW MDM: CPT | Mod: GC

## 2021-11-19 PROCEDURE — 85014 HEMATOCRIT: CPT

## 2021-11-19 PROCEDURE — 87103 BLOOD FUNGUS CULTURE: CPT

## 2021-11-19 PROCEDURE — 71046 X-RAY EXAM CHEST 2 VIEWS: CPT | Mod: 26

## 2021-11-19 PROCEDURE — 85610 PROTHROMBIN TIME: CPT

## 2021-11-19 PROCEDURE — 36415 COLL VENOUS BLD VENIPUNCTURE: CPT

## 2021-11-19 PROCEDURE — 84132 ASSAY OF SERUM POTASSIUM: CPT

## 2021-11-19 PROCEDURE — 80048 BASIC METABOLIC PNL TOTAL CA: CPT

## 2021-11-19 PROCEDURE — 80053 COMPREHEN METABOLIC PANEL: CPT

## 2021-11-19 PROCEDURE — 87040 BLOOD CULTURE FOR BACTERIA: CPT

## 2021-11-19 PROCEDURE — 71046 X-RAY EXAM CHEST 2 VIEWS: CPT

## 2021-11-19 RX ORDER — SODIUM CHLORIDE 9 MG/ML
1000 INJECTION INTRAMUSCULAR; INTRAVENOUS; SUBCUTANEOUS ONCE
Refills: 0 | Status: COMPLETED | OUTPATIENT
Start: 2021-11-19 | End: 2021-11-19

## 2021-11-19 RX ORDER — MYCOPHENOLATE MOFETIL 250 MG/1
500 CAPSULE ORAL ONCE
Refills: 0 | Status: COMPLETED | OUTPATIENT
Start: 2021-11-19 | End: 2021-11-19

## 2021-11-19 RX ORDER — INSULIN LISPRO 100/ML
6 VIAL (ML) SUBCUTANEOUS ONCE
Refills: 0 | Status: COMPLETED | OUTPATIENT
Start: 2021-11-19 | End: 2021-11-19

## 2021-11-19 RX ORDER — TAMSULOSIN HYDROCHLORIDE 0.4 MG/1
0.4 CAPSULE ORAL ONCE
Refills: 0 | Status: COMPLETED | OUTPATIENT
Start: 2021-11-19 | End: 2021-11-19

## 2021-11-19 RX ORDER — MYCOPHENOLATE MOFETIL 250 MG/1
250 CAPSULE ORAL
Qty: 60 | Refills: 11 | Status: DISCONTINUED | COMMUNITY
Start: 2021-04-01 | End: 2021-11-19

## 2021-11-19 RX ORDER — INSULIN GLARGINE 100 [IU]/ML
12 INJECTION, SOLUTION SUBCUTANEOUS AT BEDTIME
Refills: 0 | Status: DISCONTINUED | OUTPATIENT
Start: 2021-11-19 | End: 2021-11-23

## 2021-11-19 RX ORDER — EVEROLIMUS 10 MG/1
1 TABLET ORAL ONCE
Refills: 0 | Status: COMPLETED | OUTPATIENT
Start: 2021-11-19 | End: 2021-11-19

## 2021-11-19 RX ORDER — OMEGA-3 ACID ETHYL ESTERS 1 G
1 CAPSULE ORAL ONCE
Refills: 0 | Status: COMPLETED | OUTPATIENT
Start: 2021-11-19 | End: 2021-11-19

## 2021-11-19 RX ADMIN — MYCOPHENOLATE MOFETIL 500 MILLIGRAM(S): 250 CAPSULE ORAL at 22:09

## 2021-11-19 RX ADMIN — Medication 6 UNIT(S): at 20:56

## 2021-11-19 RX ADMIN — SODIUM CHLORIDE 1000 MILLILITER(S): 9 INJECTION INTRAMUSCULAR; INTRAVENOUS; SUBCUTANEOUS at 20:59

## 2021-11-19 RX ADMIN — Medication 1 GRAM(S): at 22:09

## 2021-11-19 RX ADMIN — SODIUM CHLORIDE 1000 MILLILITER(S): 9 INJECTION INTRAMUSCULAR; INTRAVENOUS; SUBCUTANEOUS at 22:26

## 2021-11-19 RX ADMIN — INSULIN GLARGINE 12 UNIT(S): 100 INJECTION, SOLUTION SUBCUTANEOUS at 20:58

## 2021-11-19 RX ADMIN — EVEROLIMUS 1 MILLIGRAM(S): 10 TABLET ORAL at 22:09

## 2021-11-19 RX ADMIN — TAMSULOSIN HYDROCHLORIDE 0.4 MILLIGRAM(S): 0.4 CAPSULE ORAL at 21:00

## 2021-11-19 NOTE — ED CLERICAL - NS ED CLERK NOTE PRE-ARRIVAL INFORMATION; ADDITIONAL PRE-ARRIVAL INFORMATION
CC/Reason For referral: R/O Covid. Pt with cough/ sore throat and weakness.   Patient Hx Liver Transplant July 2020  Preferred Consultant(if applicable):  Dr. Pérez for ID cell [phone]  Who admits for you (if needed): call MD for decision if needed  Do you have documents you would like to fax over? no  Would you still like to speak to an ED attending? Yes Please

## 2021-11-19 NOTE — ED PROVIDER NOTE - CARE PLAN
Principal Discharge DX:	Sore throat   1 Principal Discharge DX:	Hyperglycemia  Secondary Diagnosis:	Viral URI with cough  Secondary Diagnosis:	Generalized weakness

## 2021-11-19 NOTE — ED PROVIDER NOTE - PROGRESS NOTE DETAILS
Jada IBRAHIM (PGY-3): Spoke with transplant team, aware of UA, as pt is not symptomatic, and has had leuk esterase in urine in past, recommends not treating with abx at this time, and will follow the culture and treat if culture is positive.

## 2021-11-19 NOTE — ED PROVIDER NOTE - CLINICAL SUMMARY MEDICAL DECISION MAKING FREE TEXT BOX
Patient is 66yo M with multiple medical problems presenting with several weeks of sore throat, general body weakness and fatigue. Patient denies cough, CP, SOB, fevers, chills today. Concern for viral illness, concern of PMH of liver transplant, will order blood work including blood cultures, chest xray, plan to admit to transplant team.

## 2021-11-19 NOTE — ED PROVIDER NOTE - RESPIRATORY NEGATIVE STATEMENT, MLM
no chest pain, no cough, and no shortness of breath today, reports cough over one week ago now relieved

## 2021-11-19 NOTE — ED PROVIDER NOTE - NSFOLLOWUPINSTRUCTIONS_ED_ALL_ED_FT
- Lab and imaging results, if performed, were discussed with you along with your discharge diagnosis    - Follow up with your transplant team within one week, they would like to see you in the office this week and will schedule you.     - Return to the ED for any new, worsening, or concerning symptoms to you    - You will be contacted if your urine culture is positive in order to be started on antibiotics    - Continue all prescribed medications    - Rest and keep yourself hydrated with fluids

## 2021-11-19 NOTE — ED PROVIDER NOTE - PATIENT PORTAL LINK FT
You can access the FollowMyHealth Patient Portal offered by [facility] by registering at the following website: [email or web address]. By joining [facility]’s FollowMyHealth portal, you will also be able to view your health information using other applications (apps) compatible with our system.

## 2021-11-19 NOTE — ED ADULT TRIAGE NOTE - CHIEF COMPLAINT QUOTE
Weakness x several weeks has been having loss of appetite Denies fever or chills  Covid neg C/o cough

## 2021-11-19 NOTE — CONSULT NOTE ADULT - ASSESSMENT
65M Hx decompensated JEAN cirrhosis s/p DDLT (7/2/2020 -- recipient CMV neg | donor CMV (+)) c/b CNII toxicity + VRE bacteremia, DM, HTN, obesity, HFpEF, MGUS, GAVE, duodenal AVM s/p APC, c diff infection (12/20), osteomyelitis (12/20) p/w fatigue, sore throat, cough x 3 weeks.       Impression:   #Cough, sore throat: possible pharyngitis / URI vs will need to evaluate for PNA   #CMV donor (+)  #S/p DDLT for decompensated JEAN cirrhosis       Recommendations:   - Please obtain full infectious work up: COVID PCR, CXR, BCx x 2, UA/UCx, fungal cultures  - Obtain CMV PCR   - Trend CMP, CBC, PT/INR daily   - Full note to follow           Thank you for involving us in the care of this patient, please reach out if any further questions.     Ky Ram MD  Gastroenterology/Hepatology Fellow, PGY5    Available on Microsoft Teams  [phone] ([facility])  [phone] ([facility])  Please contact on call fellow weekdays after 5pm-7am and weekends: [phone]    65M Hx decompensated JEAN cirrhosis s/p DDLT (7/2/2020 -- recipient CMV neg | donor CMV (+)) c/b CNII toxicity + VRE bacteremia, DM, HTN, obesity, HFpEF, MGUS, GAVE, duodenal AVM s/p APC, c diff infection (12/20), osteomyelitis (12/20) p/w fatigue, sore throat, cough x 3 weeks.       Impression:   #Cough, sore throat: x3 weeks of symptoms. Ddx possible pharyngitis vs URI vs will need to evaluate for PNA vs influenza vs COVID-19 infection. Currently afebrile, HD stable without leukocytosis (only elevated neutrophil count) with recent sick contact with similar symptoms.     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 Off CMV ppx.     #S/p DDLT for decompensated JEAN cirrhosis (7/2/2020)      Recommendations:   - Please obtain full infectious work up:             --COVID PCR            -- RVP, influenza swab             -- Group A step swab            -- CXR            -- BCx x 2            -- UA/UCx             -- fungitell             -- can consider fungal cultures            -- if having diarrhea, obtain C diff + GI PCR  - Obtain CMV PCR   - Immunosuppression regimen: everolimus 4/4, MMF 500mg, prednisone 5mg  - Trend CMP, CBC, PT/INR daily             Thank you for involving us in the care of this patient, please reach out if any further questions.     Ky Ram MD  Gastroenterology/Hepatology Fellow, PGY5    Available on Microsoft Teams  [phone] ([facility])  [phone] ([facility])  Please contact on call fellow weekdays after 5pm-7am and weekends: [phone]    65M Hx decompensated JEAN cirrhosis s/p DDLT (7/2/2020 -- recipient CMV neg | donor CMV (+)) c/b CNII toxicity + VRE bacteremia, DM, HTN, obesity, HFpEF, MGUS, GAVE, duodenal AVM s/p APC, c diff infection (12/20), osteomyelitis (12/20) p/w fatigue, sore throat, cough x 3 weeks.       Impression:   #Cough, sore throat: x3 weeks of symptoms. Ddx possible pharyngitis vs URI vs will need to evaluate for PNA vs influenza vs COVID-19 infection. Currently afebrile, HD stable without leukocytosis (only elevated neutrophil count) with recent sick contact with similar symptoms.     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 Off CMV ppx.     #S/p DDLT for decompensated JEAN cirrhosis (7/2/2020)      Recommendations:   - Please obtain full infectious work up:             --COVID PCR            -- RVP, influenza swab             -- Group A step swab            -- CXR            -- BCx x 2            -- UA/UCx             -- fungitell             -- can consider fungal cultures            -- if having diarrhea, obtain C diff + GI PCR  - Obtain CMV PCR   - Discussed placing on valcyte for CMV viremia (see attending attestation later)  - Immunosuppression regimen: everolimus 4/4, MMF 500mg, prednisone 5mg  - Trend CMP, CBC, PT/INR daily             Thank you for involving us in the care of this patient, please reach out if any further questions.     Ky Ram MD  Gastroenterology/Hepatology Fellow, PGY5    Available on Microsoft Teams  [phone] ([facility])  [phone] ([facility])  Please contact on call fellow weekdays after 5pm-7am and weekends: [phone]    65M Hx decompensated JEAN cirrhosis s/p DDLT (7/2/2020 -- recipient CMV neg | donor CMV (+)) c/b CNII toxicity + VRE bacteremia, DM, HTN, obesity, HFpEF, MGUS, GAVE, duodenal AVM s/p APC, c diff infection (12/20), osteomyelitis (12/20) p/w fatigue, sore throat, cough x 3 weeks.       Impression:   #Cough, sore throat: x3 weeks of symptoms. Ddx possible pharyngitis vs URI vs will need to evaluate for PNA vs influenza vs COVID-19 infection. Currently afebrile, HD stable without leukocytosis (only elevated neutrophil count) with recent sick contact with similar symptoms.     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 Off CMV ppx.     #S/p DDLT for decompensated JEAN cirrhosis (7/2/2020)      Recommendations:   - Please obtain full infectious work up:             --COVID PCR            -- RVP, influenza swab             -- CXR            -- BCx x 2            -- UA/UCx   - Obtain repeat CMV PCR   - Start renally dosed Valcyte for low level CMV viremia, discussed with Transplant ID  - Immunosuppression regimen: everolimus 4/4, MMF 500mg, prednisone 5mg  - If labs stable, COVID-19 PCR negative, and no pneumonia on CXR, may be able to be discharged home from ER without admission, with plan for close outpatient follow-up next week in transplant office            Thank you for involving us in the care of this patient, please reach out if any further questions.     Ky Ram MD  Gastroenterology/Hepatology Fellow, PGY5    Available on Microsoft Teams  [phone] ([facility])  [phone] ([facility])  Please contact on call fellow weekdays after 5pm-7am and weekends: [phone]

## 2021-11-19 NOTE — CONSULT NOTE ADULT - SUBJECTIVE AND OBJECTIVE BOX
HPI: 65M Hx decompensated JEAN cirrhosis s/p DDLT (7/2/2020)     Allergies:  codeine (Anaphylaxis)      Home Medications:    Hospital Medications:      PMHX/PSHX:  Diabetes    Hypercholesteremia    Neuropathy    Hypertension    Renal stones    Fatty liver disease, nonalcoholic    Obesity    Hepatic encephalopathy    GERD with esophagitis    GIB (gastrointestinal bleeding)    No significant past surgical history    S/P cholecystectomy        Family history:  No pertinent family history in first degree relatives    Family history of stomach cancer    Family history of hypertension    Family history of type 2 diabetes mellitus        Denies family history of colon cancer/polyps, stomach cancer/polyps, pancreatic cancer/masses, liver cancer/disease, ovarian cancer and endometrial cancer.    Social History:     Tob: Denies  EtOH: Denies  Illicit Drugs: Denies    ROS:     General:  No wt loss, fevers, chills, night sweats, fatigue  Eyes:  Good vision, no reported pain  ENT:  No sore throat, pain, runny nose, dysphagia  CV:  No pain, palpitations, hypo/hypertension  Pulm:  No dyspnea, cough, tachypnea, wheezing  GI:  No pain, No nausea, No vomiting, No diarrhea, No constipation, No weight loss, No fever, No pruritis, No rectal bleeding, No tarry stools, No dysphagia,  :  No pain, bleeding, incontinence, nocturia  Muscle:  No pain, weakness  Neuro:  No weakness, tingling, memory problems  Psych:  No fatigue, insomnia, mood problems, depression  Endocrine:  No polyuria, polydipsia, cold/heat intolerance  Heme:  No petechiae, ecchymosis, easy bruisability  Skin:  No rash, tattoos, scars, edema    PHYSICAL EXAM:     GENERAL:  No acute distress  HEENT:  Normocephalic/atraumatic, no scleral icterus  CHEST:  Clear to auscultation bilaterally, no wheezes/rales/ronchi, no accessory muscle use  HEART:  Regular rate and rhythm, no murmurs/rubs/gallops  ABDOMEN:  Soft, non-tender, non-distended, normoactive bowel sounds,  no masses, no hepato-splenomegaly, no signs of chronic liver disease  EXTREMITIES: No cyanosis, clubbing, or edema  SKIN:  No rash/erythema/ecchymoses/petechiae/wounds/abscess/warm/dry  NEURO:  Alert and oriented x 3, no asterixis    Vital Signs:  Vital Signs Last 24 Hrs  T(C): 36.9 (19 Nov 2021 15:38), Max: 36.9 (19 Nov 2021 13:57)  T(F): 98.4 (19 Nov 2021 15:38), Max: 98.5 (19 Nov 2021 13:57)  HR: 90 (19 Nov 2021 15:38) (90 - 91)  BP: 112/72 (19 Nov 2021 15:38) (109/62 - 112/72)  BP(mean): --  RR: 18 (19 Nov 2021 13:57) (18 - 18)  SpO2: 99% (19 Nov 2021 15:38) (99% - 99%)  Daily Height in cm: 185.42 (19 Nov 2021 13:57)    Daily     LABS:                        Imaging:             HPI: 65M Hx decompensated JEAN cirrhosis s/p DDLT (7/2/2020 -- recipient CMV neg | donor CMV (+)) c/b CNII toxicity + VRE bacteremia, DM, HTN, obesity, HFpEF, MGUS, GAVE, duodenal AVM s/p APC, c diff infection (12/20), osteomyelitis (12/20) p/w fatigue, sore throat, cough x 3 weeks.     Pt endorses main symptoms have been severe fatigue, cough (particularly at night), and sore throat x 3 weeks. Has had FORTINO + thinks weight loss of 10lbs in recent months. Denies abd pain, abd distension, n/v/d/f/c, SOB/MOYER, chest pain, jaundice.     Allergies:  codeine (Anaphylaxis)      Home Medications:    Hospital Medications:      PMHX/PSHX:  Diabetes    Hypercholesteremia    Neuropathy    Hypertension    Renal stones    Fatty liver disease, nonalcoholic    Obesity    Hepatic encephalopathy    GERD with esophagitis    GIB (gastrointestinal bleeding)    No significant past surgical history    S/P cholecystectomy        Family history:  No pertinent family history in first degree relatives    Family history of stomach cancer    Family history of hypertension    Family history of type 2 diabetes mellitus        Denies family history of colon cancer/polyps, stomach cancer/polyps, pancreatic cancer/masses, liver cancer/disease, ovarian cancer and endometrial cancer.    Social History:     Tob: Denies  EtOH: Denies  Illicit Drugs: Denies    ROS:     General:  No wt loss, fevers, chills, night sweats, fatigue  Eyes:  Good vision, no reported pain  ENT:  No sore throat, pain, runny nose, dysphagia  CV:  No pain, palpitations, hypo/hypertension  Pulm:  No dyspnea, cough, tachypnea, wheezing  GI:  No pain, No nausea, No vomiting, No diarrhea, No constipation, No weight loss, No fever, No pruritis, No rectal bleeding, No tarry stools, No dysphagia,  :  No pain, bleeding, incontinence, nocturia  Muscle:  No pain, weakness  Neuro:  No weakness, tingling, memory problems  Psych:  No fatigue, insomnia, mood problems, depression  Endocrine:  No polyuria, polydipsia, cold/heat intolerance  Heme:  No petechiae, ecchymosis, easy bruisability  Skin:  No rash, tattoos, scars, edema    PHYSICAL EXAM:     GENERAL:  No acute distress  HEENT:  Normocephalic/atraumatic, no scleral icterus  CHEST:  Clear to auscultation bilaterally, no wheezes/rales/ronchi, no accessory muscle use  HEART:  Regular rate and rhythm, no murmurs/rubs/gallops  ABDOMEN:  Soft, non-tender, non-distended, normoactive bowel sounds,  no masses, no hepato-splenomegaly, no signs of chronic liver disease  EXTREMITIES: No cyanosis, clubbing, or edema  SKIN:  No rash/erythema/ecchymoses/petechiae/wounds/abscess/warm/dry  NEURO:  Alert and oriented x 3, no asterixis    Vital Signs:  Vital Signs Last 24 Hrs  T(C): 36.9 (19 Nov 2021 15:38), Max: 36.9 (19 Nov 2021 13:57)  T(F): 98.4 (19 Nov 2021 15:38), Max: 98.5 (19 Nov 2021 13:57)  HR: 90 (19 Nov 2021 15:38) (90 - 91)  BP: 112/72 (19 Nov 2021 15:38) (109/62 - 112/72)  BP(mean): --  RR: 18 (19 Nov 2021 13:57) (18 - 18)  SpO2: 99% (19 Nov 2021 15:38) (99% - 99%)  Daily Height in cm: 185.42 (19 Nov 2021 13:57)    Daily     LABS:                        Imaging:             HPI: 65M Hx decompensated JEAN cirrhosis s/p DDLT (7/2/2020 -- recipient CMV neg | donor CMV (+)) c/b CNII toxicity + VRE bacteremia, DM, HTN, obesity, HFpEF, MGUS, GAVE, duodenal AVM s/p APC, c diff infection (12/20), osteomyelitis (12/20) p/w fatigue, sore throat, cough x 3 weeks.     Pt endorses main symptoms have been severe fatigue, cough (particularly at night), sore throat x 3 weeks. Has had FORTINO + thinks weight loss of 10lbs in recent months. Endorses 3 daily BMs, usually soft (this is typical for him). Denies runny nose, abd pain, abd distension, n/v/f/c, SOB/MOYER, chest pain, jaundice. (+) sick contact w/ similar symptoms of sore throat and cough (denies that he had COVID infection).     In ED, afebrile HD stable on RA     Outpatient Labs:   WBC 7.53 > Hb 8.4 < (elevated neutrophil % to 80)  BUN 46 / Cr 2.03 (in 10/21 BUN 35/Cr 2.09, b/l Cr in 1.8s)  Tbili 0.3 | Alk P 169 | AST 37 | ALT 86  CMV  <-- not detectable 2/21        Allergies:  codeine (Anaphylaxis)      Home Medications:    Hospital Medications:      PMHX/PSHX:  Diabetes    Hypercholesteremia    Neuropathy    Hypertension    Renal stones    Fatty liver disease, nonalcoholic    Obesity    Hepatic encephalopathy    GERD with esophagitis    GIB (gastrointestinal bleeding)    No significant past surgical history    S/P cholecystectomy        Family history:  No pertinent family history in first degree relatives    Family history of stomach cancer    Family history of hypertension    Family history of type 2 diabetes mellitus        Denies family history of colon cancer/polyps, stomach cancer/polyps, pancreatic cancer/masses, liver cancer/disease, ovarian cancer and endometrial cancer.    Social History:     Tob: Denies  EtOH: Denies  Illicit Drugs: Denies    ROS:     General: +wt loss, fevers, chills, night sweats, +fatigue  Eyes:  Good vision, no reported pain  ENT:  No sore throat, pain, runny nose, dysphagia  CV:  No pain, palpitations, hypo/hypertension  Pulm:  No dyspnea, cough, tachypnea, wheezing  GI: see above  :  No pain, bleeding, incontinence, nocturia  Muscle:  No pain, weakness  Neuro:  No weakness, tingling, memory problems  Psych:  No fatigue, insomnia, mood problems, depression  Endocrine:  No polyuria, polydipsia, cold/heat intolerance  Heme:  No petechiae, ecchymosis, easy bruisability  Skin:  No rash, tattoos, scars, edema    PHYSICAL EXAM:     GENERAL:  No acute distress, tired appearing, +hoarsness  HEENT:  Normocephalic/atraumatic, no scleral icterus, no exudates seen  CHEST:  Clear to auscultation bilaterally, no wheezes/rales/ronchi, no accessory muscle use  HEART:  Regular rate and rhythm, no murmurs/rubs/gallops  ABDOMEN:  Soft, non-tender, non-distended, normoactive bowel sounds, +surgical scars  EXTREMITIES: No cyanosis, clubbing, or edema  SKIN:  No rash/warm/dry  NEURO:  Alert and oriented x 3, no asterixis, +ambulating with cane    Vital Signs:  Vital Signs Last 24 Hrs  T(C): 36.9 (19 Nov 2021 15:38), Max: 36.9 (19 Nov 2021 13:57)  T(F): 98.4 (19 Nov 2021 15:38), Max: 98.5 (19 Nov 2021 13:57)  HR: 90 (19 Nov 2021 15:38) (90 - 91)  BP: 112/72 (19 Nov 2021 15:38) (109/62 - 112/72)  BP(mean): --  RR: 18 (19 Nov 2021 13:57) (18 - 18)  SpO2: 99% (19 Nov 2021 15:38) (99% - 99%)  Daily Height in cm: 185.42 (19 Nov 2021 13:57)    Daily     LABS:                        Imaging:

## 2021-11-19 NOTE — CONSULT NOTE ADULT - ATTENDING COMMENTS
64 y/o M PMHx DDLT (07/2020, c/b post-op VRE bacteremia), prior ESBL e.coli prostate abscess, R heel OM (12/2020), C. Diff (12/2020), DM, MGUS, and HFpEF, presents with sore throat and cough x several weeks.     Suspect viral etiology  Recommend RVP  Lower suspicion for bacterial etiology, but reasonable to check rapid strep Ag  Recommend CXR    Low level CMV viremia unlikely contributing  Reasonable to reinitiate Valcyte at PPx dosing - 450 mg PO Q24H    If labwork is unrevealing and patient remains stable no objection to discharge from the ED    Overall, Cough, CMV Viremia, MISA, OLT Recipient    I will be away over this upcoming weekend. Please contact the Infectious Diseases Office with any further questions or concerns.     Eusebio Pérez M.D.  [facility] Division of Infectious Disease  8AM-5PM: Pager Number [phone]  After Hours (or if no response): Please contact the Infectious Diseases Office at [phone]     The above assessment and plan were discussed with transplant surgery team and Dr Letitia Mo
66 yo M with obesity, HTN, IDDM2, SHENG, MGUS, HFpEF, CKD (with baseline Cr 1.8), and decompensated JEAN cirrhosis s/p DDLT (7/2/20, CMV high risk with D+/R-) with post-transplant course complicated by CNI neurotoxicity necessitating switch to everolimus, C diff colitis (12/2020), and osteomyelitis (12/2020), presenting to the ER due to 2-3 weeks of viral upper respiratory symptoms, also with mild MISA on CKD, mild liver enzyme elevations, and low level CMV viremia on recent outpatient labs done on 11/17/21. Clinically he appears non-toxic. Recommend RVP, COVID-19 PCR, and CXR, as well as repeat labs to reassess liver chemistries and renal function. Will start renally dosed Valcyte as per discussion with Transplant ID and Transplant Surgery. If labs stable, COVID-19 PCR negative, and no pneumonia on CXR, he may be able to be discharged home from ER without admission, with plan for close outpatient follow-up next week in our Transplant office.    Please don't hesitate to call with any questions or concerns.    Letitia Mo M.D., Ph.D.  Transplant Hepatology  Cell: [phone]

## 2021-11-19 NOTE — ED PROVIDER NOTE - OBJECTIVE STATEMENT
Patient is 66 y/o M PMH DDLT (07/2020, c/b post-op VRE bacteremia), prior ESBL e.coli prostate abscess, R heel OM (12/2020), C. Diff (12/2020), DM, MGUS, and HFpEF, presents with sore throat and cough x several weeks with generalized weakness and fatigue. Patient denies fevers, CP, SOB, abdominal pain, urinary complaints, back pain or leg pain. Patient referred to ED by his transplant doctor.

## 2021-11-19 NOTE — CONSULT NOTE ADULT - SUBJECTIVE AND OBJECTIVE BOX
Patient is a 65y old  Male who presents with a chief complaint of sore throat    HPI:  66 y/o M PMHx DDLT (07/2020, c/b post-op VRE bacteremia), prior ESBL e.coli prostate abscess, R heel OM (12/2020), C. Diff (12/2020), DM, MGUS, and HFpEF, presents with sore throat and cough x several weeks.   Notes sore throat began about 3 weeks ago, associated with generalized weakness/fatigue and cough productive of clearish sputum occasionally. No fever/chills or SOB. No dysuria, abdominal pain, vomiting, or diarrhea. Had a neg COVID test on 11/15.     REVIEW OF SYSTEMS  [  ] ROS unobtainable because:    [ x ] All other systems negative except as noted below    Constitutional:  [ ] fever [ ] chills  [ ] weight loss  [ ]night sweat  [ ]poor appetite/PO intake [ ]fatigue   Skin:  [ ] rash [ ] phlebitis	  Eyes: [ ] icterus [ ] pain  [ ] discharge	  ENMT: [ ] sore throat  [ ] thrush [ ] ulcers [ ] exudates [ ]anosmia  Respiratory: [ ] dyspnea [ ] hemoptysis [ ] cough [ ] sputum	  Cardiovascular:  [ ] chest pain [ ] palpitations [ ] edema	  Gastrointestinal:  [ ] nausea [ ] vomiting [ ] diarrhea [ ] constipation [ ] pain	  Genitourinary:  [ ] dysuria [ ] frequency [ ] hematuria [ ] discharge [ ] flank pain  [ ] incontinence  Musculoskeletal:  [ ] myalgias [ ] arthralgias [ ] arthritis  [ ] back pain  Neurological:  [ ] headache [ ] weakness [ ] seizures  [ ] confusion/altered mental status    prior hospital charts reviewed [V]  primary team notes reviewed [V]  other consultant notes reviewed [V]    PAST MEDICAL & SURGICAL HISTORY:  Diabetes  Hypercholesteremia  Neuropathy  Hypertension  Renal stones  Fatty liver disease, nonalcoholic  Obesity  Hepatic encephalopathy  GERD with esophagitis  Gastritis   GIB (gastrointestinal bleeding)  S/P cholecystectomy      FAMILY HISTORY:  Family history of stomach cancer  Family history of hypertension  Family history of type 2 diabetes mellitus      SOCIAL HISTORY:  Non-smoker    Allergies  codeine (Anaphylaxis)        ANTIMICROBIALS:    ANTIMICROBIALS (past 90 days):   MEDICATIONS  (STANDING):        VITALS:  Vital Signs Last 24 Hrs  T(F): 98.4 (11-19-21 @ 15:38), Max: 98.5 (11-19-21 @ 13:57)  Vital Signs Last 24 Hrs  HR: 90 (11-19-21 @ 15:38) (90 - 91)  BP: 112/72 (11-19-21 @ 15:38) (109/62 - 112/72)  RR: 18 (11-19-21 @ 13:57)  SpO2: 99% (11-19-21 @ 15:38) (99% - 99%)  Wt(kg): --    PHYSICAL EXAM:  Constitutional: non-toxic, no distress  HEAD/EYES: anicteric, no conjunctival injection  ENT:  supple, no exudates  Cardiovascular:   +S1/S2  Respiratory:  +BS bilaterally  GI:  soft, non-tender  :  no bob  Musculoskeletal:  no LE edema  Neurologic: awake and alert, TORREZ spontaneously  Skin:  no erythema  Psychiatric:  calm, cooperative      Labs:    WBC Trend:  Auto Neutrophil #: 2.86 K/uL (12-30-20 @ 06:01)  Auto Neutrophil #: 2.76 K/uL (12-29-20 @ 06:33)  Auto Neutrophil #: 3.15 K/uL (12-28-20 @ 06:06)  Auto Neutrophil #: 3.32 K/uL (12-27-20 @ 06:30)  Auto Neutrophil #: 3.17 K/uL (12-26-20 @ 06:31)          MICROBIOLOGY:  MRSA PCR Result.: NotDetec (11-22-20 @ 04:02)    Babesia microti PCR, Blood. (collected 25 Dec 2020 09:01)  Source: .Blood  Final Report:    Babesia microti PCR    Results: NOT detected    ***************Result Note*************    The detection of Babesia microti by PCR has only been    validated for whole blood; this test has not been approved    by the US Food and Drug Administration (FDA). Performance    characteristics of this assay have been determined by    [employer]. The clinical significance    of results should be considered in conjunction with the    overall clinical presentation of the patient. Result is not    intended to be used as the sole means for clinical diagnosis    or patient management decisions.    One negative sample does not necessarily rule    out the presence of a parasitic infection.    Culture - Blood (collected 20 Dec 2020 11:52)  Source: .Blood Blood-Peripheral  Final Report:    No Growth Final    Culture - Blood (collected 20 Dec 2020 11:52)  Source: .Blood Blood-Peripheral  Final Report:    No Growth Final    Culture - Urine (collected 17 Dec 2020 20:08)  Source: .Urine Catheterized  Final Report:    No growth    Culture - Catheter (collected 15 Dec 2020 16:43)  Source: .Catheter PICC line tip  Final Report:    < 15 colonies Coag Negative Staphylococcus    Culture - Urine (collected 15 Dec 2020 16:28)  Source: .Urine Clean Catch (Midstream)  Final Report:    <10,000 CFU/mL Normal Urogenital Shantel    Culture - Blood (collected 14 Dec 2020 23:13)  Source: .Blood Blood-Peripheral  Final Report:    No Growth Final    Culture - Blood (collected 14 Dec 2020 23:13)  Source: .Blood Blood-Peripheral  Final Report:    No Growth Final    Culture - Blood (collected 14 Dec 2020 23:13)  Source: .Blood PICC/PERC Single Lumen  Final Report:    No Growth Final    Culture - Tissue with Gram Stain (collected 25 Nov 2020 00:55)  Source: .Tissue Other  Final Report:    Growth in fluid media only Staphylococcus epidermidis    "Susceptibilities not performed"  Organism: Staphylococcus epidermidis  Organism: Staphylococcus epidermidis    Sensitivities:      -  Ampicillin/Sulbactam: R <=8/4      -  Cefazolin: R <=4      -  Clindamycin: S <=0.25      -  Erythromycin: R >4      -  Gentamicin: R >8 Should not be used as monotherapy      -  Oxacillin: R >2      -  Penicillin: R >8      -  RIF- Rifampin: S <=1 Should not be used as monotherapy      -  Tetra/Doxy: R >8      -  Trimethoprim/Sulfamethoxazole: R >2/38      -  Vancomycin: S 4      Method Type: LEONARDO        COVID-19 IgG Antibody Interpretation: Negative (12-23-20 @ 08:41)  COVID-19 IgA Antibody Interpretation: Equiv (12-23-20 @ 08:41)        RADIOLOGY:  No imaging

## 2021-11-19 NOTE — CONSULT NOTE ADULT - ASSESSMENT
66 y/o M PMHx DDLT (07/2020, c/b post-op VRE bacteremia), prior ESBL e.coli prostate abscess, R heel OM (12/2020), C. Diff (12/2020), DM, MGUS, and HFpEF, presents with sore throat and cough x several weeks.     #Sore Throat, Cough   Likely had viral URI, lower suspicion for underlying PNA or GAS pharyngitis  Non-toxic appearing, no respiratory distress    #Post-Liver Transplant, Immunosuppressed State  CMV +/-  No longer on CMV prophylaxis  Last CMV  (11/17) - unlikely to be clinically significant, can monitor level    Recs:  - check full RVP  - check rapid group A strep swab  - check CXR  - monitor CBC w/ Diff  - monitor temp     66 y/o M PMHx DDLT (07/2020, c/b post-op VRE bacteremia), prior ESBL e.coli prostate abscess, R heel OM (12/2020), C. Diff (12/2020), DM, MGUS, and HFpEF, presents with sore throat and cough x several weeks.     #Sore Throat, Cough   Likely had viral URI, lower suspicion for underlying PNA or GAS pharyngitis  Non-toxic appearing, no respiratory distress    #Post-Liver Transplant, Immunosuppressed State  CMV +/-  No longer on CMV prophylaxis  Last CMV  (11/17) - unlikely to be clinically significant  However reasonable to reinitiate prophylaxis dosing Valcyte    Recs:  - Recommend Valcyte 450 mg PO Q24H  - check full RVP  - check rapid group A strep swab  - check CXR  - monitor CBC w/ Diff  - monitor temp

## 2021-11-19 NOTE — ED ADULT NURSE NOTE - OBJECTIVE STATEMENT
Patient   is  alert  and  oriented x3.  Color  is  good  and  skin warm to touch.  H e is  c/o  cough, sore  throat  and  general  body  weakness.  Patient  has  been afebrile.

## 2021-11-19 NOTE — ED PROVIDER NOTE - ATTENDING CONTRIBUTION TO CARE
65M hx of liver transplant on cellcept and prednisone 5mg, CKD, DM sent in by liver transplant service with c/o cough, generalized weakness for past 2-3 weeks. Son had URI before pt developed sx. Pt notes dry cough, sore throat, painful swallowing. Dec PO 2/2 painful swallowing. No fevers, cp, abd pain, dysuria. +Urinary frequency. Pt well appearing NAD. NCAT Normal pharynx, no tonsillar hypertrophy or erythema, no exudates. RRR. Lungs CTA BL, abd soft ntnd, skin warm dry no rashes, no fnd. D/w transplant, sending labs, cultures, RVP, CXR. TBA.

## 2021-11-20 VITALS
OXYGEN SATURATION: 98 % | DIASTOLIC BLOOD PRESSURE: 72 MMHG | TEMPERATURE: 98 F | RESPIRATION RATE: 18 BRPM | HEART RATE: 93 BPM | SYSTOLIC BLOOD PRESSURE: 120 MMHG

## 2021-11-20 LAB
ANION GAP SERPL CALC-SCNC: 13 MMOL/L — SIGNIFICANT CHANGE UP (ref 5–17)
APPEARANCE UR: ABNORMAL
BACTERIA # UR AUTO: NEGATIVE — SIGNIFICANT CHANGE UP
BILIRUB UR-MCNC: NEGATIVE — SIGNIFICANT CHANGE UP
BUN SERPL-MCNC: 40 MG/DL — HIGH (ref 7–23)
CALCIUM SERPL-MCNC: 8.7 MG/DL — SIGNIFICANT CHANGE UP (ref 8.4–10.5)
CHLORIDE SERPL-SCNC: 104 MMOL/L — SIGNIFICANT CHANGE UP (ref 96–108)
CO2 SERPL-SCNC: 19 MMOL/L — LOW (ref 22–31)
COLOR SPEC: YELLOW — SIGNIFICANT CHANGE UP
CREAT SERPL-MCNC: 1.87 MG/DL — HIGH (ref 0.5–1.3)
DIFF PNL FLD: ABNORMAL
EPI CELLS # UR: 3 /HPF — SIGNIFICANT CHANGE UP
GLUCOSE SERPL-MCNC: 240 MG/DL — HIGH (ref 70–99)
GLUCOSE UR QL: ABNORMAL
HYALINE CASTS # UR AUTO: 1 /LPF — SIGNIFICANT CHANGE UP (ref 0–2)
KETONES UR-MCNC: SIGNIFICANT CHANGE UP
LEUKOCYTE ESTERASE UR-ACNC: ABNORMAL
NITRITE UR-MCNC: NEGATIVE — SIGNIFICANT CHANGE UP
PH UR: 6.5 — SIGNIFICANT CHANGE UP (ref 5–8)
POTASSIUM SERPL-MCNC: 3.7 MMOL/L — SIGNIFICANT CHANGE UP (ref 3.5–5.3)
POTASSIUM SERPL-SCNC: 3.7 MMOL/L — SIGNIFICANT CHANGE UP (ref 3.5–5.3)
PROT UR-MCNC: ABNORMAL
RBC CASTS # UR COMP ASSIST: 27 /HPF — HIGH (ref 0–4)
SODIUM SERPL-SCNC: 136 MMOL/L — SIGNIFICANT CHANGE UP (ref 135–145)
SP GR SPEC: 1.02 — SIGNIFICANT CHANGE UP (ref 1.01–1.02)
UROBILINOGEN FLD QL: NEGATIVE — SIGNIFICANT CHANGE UP
WBC UR QL: 386 /HPF — HIGH (ref 0–5)

## 2021-11-20 RX ORDER — VALGANCICLOVIR 450 MG/1
1 TABLET, FILM COATED ORAL
Qty: 30 | Refills: 0
Start: 2021-11-20 | End: 2021-12-19

## 2021-11-21 LAB
CULTURE RESULTS: SIGNIFICANT CHANGE UP
SPECIMEN SOURCE: SIGNIFICANT CHANGE UP

## 2021-11-22 LAB — EVEROLIMUS BLD-MCNC: 9.1 NG/ML

## 2021-11-23 LAB — CMV DNA CSF QL NAA+PROBE: SIGNIFICANT CHANGE UP

## 2021-11-24 LAB
CULTURE RESULTS: SIGNIFICANT CHANGE UP
SPECIMEN SOURCE: SIGNIFICANT CHANGE UP

## 2021-11-25 LAB
CULTURE RESULTS: SIGNIFICANT CHANGE UP
SPECIMEN SOURCE: SIGNIFICANT CHANGE UP

## 2021-11-29 ENCOUNTER — APPOINTMENT (OUTPATIENT)
Dept: TRANSPLANT | Facility: CLINIC | Age: 66
End: 2021-11-29
Payer: MEDICARE

## 2021-11-29 VITALS
OXYGEN SATURATION: 98 % | WEIGHT: 217 LBS | DIASTOLIC BLOOD PRESSURE: 63 MMHG | HEART RATE: 99 BPM | TEMPERATURE: 98 F | RESPIRATION RATE: 16 BRPM | SYSTOLIC BLOOD PRESSURE: 110 MMHG | BODY MASS INDEX: 29.39 KG/M2 | HEIGHT: 72 IN

## 2021-11-29 DIAGNOSIS — R07.81 PLEURODYNIA: ICD-10-CM

## 2021-11-29 PROCEDURE — 99215 OFFICE O/P EST HI 40 MIN: CPT

## 2021-11-29 RX ORDER — ACETAMINOPHEN 325 MG/1
325 TABLET ORAL
Qty: 90 | Refills: 0 | Status: ACTIVE | COMMUNITY
Start: 2020-07-21

## 2021-11-29 NOTE — PHYSICAL EXAM
[Alert] : alert [Clear to Auscultation] : lungs were clear to auscultation bilaterally [Normal Rate] : normal rate [Soft] : soft [No Edema] : no edema [] : right femoral palpable [Scleral Icterus] : no scleral icterus [Ascites Fluid Wave] : no ascites fluid wave [Abdominal Ascites] : no abdominal ascites [Spider Angioma] : no spider angioma [Asterixis] : no asterixis [Hepatic Encephalopathy] : no hepatic encephalopathy [de-identified] : throat not particularly injected [de-identified] : right 8th rib mid axillary line point tenderness mid 8th rib [de-identified] : well healed, right flank pain - [de-identified] : Healed  no hernia [FreeTextEntry1] :  1+ le Edema R>L  [de-identified] : no shake

## 2021-11-29 NOTE — HISTORY OF PRESENT ILLNESS
[Nonalcoholic Steatohepatitis (JEAN)] : Nonalcoholic Steatohepatitis (JEAN) [Liver] : Liver [PHS Increased Risk] : no Public Health Service increased risk [Hepatitis C (DANA+)] : no hepatitis c (DANA+) [Hepatitis B Core Ab Positive] : not hepatitis B core Ab positive [FreeTextEntry1] : 65 yr old s/p OLT 7/2/2020. \par \par PMHx of IDDM, HLD, obesity, HFpEF with mild LV diastolic  dysfunction, MGUS, chronic anemia with a history of duodenal ulcer as well as GAVE and duodenal AVM s/p APC (last on 10/11/19), 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 \par \par Last Hospitalization: \par 12/14/20 -12/30/20 for  transaminitis, osteomyelitis and MISA. MISA required HD on 12/19/20 and 12/22.  Transaminitis resolved and doppler with good perfusion. Pt was c-diff positive, completed PO vanco on 1/2/20 and completed course of meropenem for R. heel OM.  \par \par Interval Events (11/29/21): Last seen by me on 9/13/21 and presents today for follow up. \par \par Seen in ER on 11/19 for weakness, cough, slight elevation in cr 2 (baseline 1.6-1.8). , CXR clear, covid neg. Started on valcyte 450mg daily. \par \par Still with cough, sore throat and weakness, slightly improved. Fell at home last week, right flank pain. Sugars 120-160 per patient. \par \par Has not had covid booster. Will get today. \par \par Current Immuno everolimus 4mg BID, MMF 500mg BID and prednisone 5mg.\par \par Labs 11/19/21\par AST 41, ALT 91, AlkP 163, TB 0.3, Cr 1.87 (2.0), K 3.7, Gluc 240, Everolimus - 9.1\par WBC 5.5, H/H 8.7/26.9, Plt 226, \par \par \par Interval Events: (9/13/21)-Weight today 236 (4 lb weight loss since last visit) \par Blood glucose reading at home between 100-160 taking lantus 12 and sliding scale with meals \par Recieved Pfizer Vaccine -has not developed AB \par LFTs stable \par \par (8/19/21) -1 year post transplant, looks well, Foot wound healed. \par Labs reviewed LFTs stable. anemic but stable. Not on procrit not covered by insurance, unable to afford. \par Discussed getting booster covid vaccine, pt has failed to develop a response. James Ab have been negative.

## 2021-11-29 NOTE — REVIEW OF SYSTEMS
[Negative] : Heme/Lymph [Diarrhea] : diarrhea [Itching] : no itching [Skin Rash] : no skin rash [FreeTextEntry9] : right foot wood healed  [de-identified] : reports easy bruising

## 2021-11-29 NOTE — ASSESSMENT
[FreeTextEntry1] : 65 yr old male s/p OLT on 7/2/20 for JEAN on everolimus, MMF and pred.  Ambulating with cane for stability.  \par Recent ER visit for cough, weakness and elevated Cr 2.0. Found to have , valcyte 450mg started. \par \par Immuno: Everolimus 4/4,  BId, Pred 5 \par \par -Labs 11/19/21\par AST 41, ALT 91, AlkP 163, TB 0.3, Cr 1.87 (2.0), K 3.7, Gluc 240, Everolimus - 9.1\par WBC 5.5, H/H 8.7/26.9, Plt 226, \par \par -Labs 10/14/21 - Trigly 376, Chol 165, LDL 43\par -Labs 9/13/21 - A1C 8.5\par \par Plan: \par -Xray rib and follow chest x ray, repeat covid test labs and covid booster, rx diclofenac 1 week and 450 valgan, check cmv. Recheck everolimus level

## 2021-11-30 ENCOUNTER — OUTPATIENT (OUTPATIENT)
Dept: OUTPATIENT SERVICES | Facility: HOSPITAL | Age: 66
LOS: 1 days | End: 2021-11-30
Payer: MEDICARE

## 2021-11-30 ENCOUNTER — APPOINTMENT (OUTPATIENT)
Dept: RADIOLOGY | Facility: CLINIC | Age: 66
End: 2021-11-30
Payer: MEDICARE

## 2021-11-30 DIAGNOSIS — Z90.49 ACQUIRED ABSENCE OF OTHER SPECIFIED PARTS OF DIGESTIVE TRACT: Chronic | ICD-10-CM

## 2021-11-30 DIAGNOSIS — Z00.8 ENCOUNTER FOR OTHER GENERAL EXAMINATION: ICD-10-CM

## 2021-11-30 PROCEDURE — 71046 X-RAY EXAM CHEST 2 VIEWS: CPT

## 2021-11-30 PROCEDURE — 71046 X-RAY EXAM CHEST 2 VIEWS: CPT | Mod: 26

## 2021-11-30 PROCEDURE — 71100 X-RAY EXAM RIBS UNI 2 VIEWS: CPT | Mod: 26,RT

## 2021-11-30 PROCEDURE — 71100 X-RAY EXAM RIBS UNI 2 VIEWS: CPT

## 2021-12-03 ENCOUNTER — NON-APPOINTMENT (OUTPATIENT)
Age: 66
End: 2021-12-03

## 2021-12-03 LAB
ALBUMIN SERPL ELPH-MCNC: 3.5 G/DL
ALP BLD-CCNC: 175 U/L
ALT SERPL-CCNC: 118 U/L
ANION GAP SERPL CALC-SCNC: 13 MMOL/L
AST SERPL-CCNC: 80 U/L
BASOPHILS # BLD AUTO: 0.05 K/UL
BASOPHILS NFR BLD AUTO: 0.8 %
BILIRUB SERPL-MCNC: 0.3 MG/DL
BUN SERPL-MCNC: 40 MG/DL
CALCIUM SERPL-MCNC: 8.8 MG/DL
CHLORIDE SERPL-SCNC: 103 MMOL/L
CHOLEST SERPL-MCNC: 135 MG/DL
CMV DNA SPEC QL NAA+PROBE: 150 IU/ML
CO2 SERPL-SCNC: 22 MMOL/L
COVID-19 SPIKE DOMAIN ANTIBODY INTERPRETATION: NEGATIVE
CREAT SERPL-MCNC: 1.96 MG/DL
EOSINOPHIL # BLD AUTO: 0.08 K/UL
EOSINOPHIL NFR BLD AUTO: 1.2 %
ESTIMATED AVERAGE GLUCOSE: 266 MG/DL
EVEROLIMUS BLD-MCNC: 8.4 NG/ML
GLUCOSE SERPL-MCNC: 315 MG/DL
HBA1C MFR BLD HPLC: 10.9 %
HCT VFR BLD CALC: 25.5 %
HDLC SERPL-MCNC: 44 MG/DL
HGB BLD-MCNC: 7.9 G/DL
IMM GRANULOCYTES NFR BLD AUTO: 0.5 %
LDLC SERPL CALC-MCNC: 16 MG/DL
LYMPHOCYTES # BLD AUTO: 0.98 K/UL
LYMPHOCYTES NFR BLD AUTO: 15.1 %
MAGNESIUM SERPL-MCNC: 1.7 MG/DL
MAN DIFF?: NORMAL
MCHC RBC-ENTMCNC: 26 PG
MCHC RBC-ENTMCNC: 31 GM/DL
MCV RBC AUTO: 83.9 FL
MONOCYTES # BLD AUTO: 0.31 K/UL
MONOCYTES NFR BLD AUTO: 4.8 %
NEUTROPHILS # BLD AUTO: 5.05 K/UL
NEUTROPHILS NFR BLD AUTO: 77.6 %
NONHDLC SERPL-MCNC: 91 MG/DL
PLATELET # BLD AUTO: 228 K/UL
POTASSIUM SERPL-SCNC: 4.8 MMOL/L
PROT SERPL-MCNC: 6.5 G/DL
RBC # BLD: 3.04 M/UL
RBC # FLD: 14.2 %
SARS-COV-2 AB SERPL IA-ACNC: 0.4 U/ML
SARS-COV-2 N GENE NPH QL NAA+PROBE: NOT DETECTED
SODIUM SERPL-SCNC: 138 MMOL/L
TRIGL SERPL-MCNC: 372 MG/DL
WBC # FLD AUTO: 6.5 K/UL

## 2021-12-07 ENCOUNTER — APPOINTMENT (OUTPATIENT)
Dept: HEPATOLOGY | Facility: CLINIC | Age: 66
End: 2021-12-07
Payer: MEDICARE

## 2021-12-07 VITALS
WEIGHT: 217 LBS | RESPIRATION RATE: 16 BRPM | OXYGEN SATURATION: 99 % | HEIGHT: 72 IN | DIASTOLIC BLOOD PRESSURE: 65 MMHG | TEMPERATURE: 97.7 F | HEART RATE: 93 BPM | SYSTOLIC BLOOD PRESSURE: 106 MMHG | BODY MASS INDEX: 29.39 KG/M2

## 2021-12-07 PROCEDURE — 99214 OFFICE O/P EST MOD 30 MIN: CPT

## 2021-12-07 RX ORDER — LIRAGLUTIDE 6 MG/ML
18 INJECTION SUBCUTANEOUS DAILY
Qty: 1 | Refills: 3 | Status: DISCONTINUED | COMMUNITY
Start: 2021-03-15 | End: 2021-12-07

## 2021-12-13 NOTE — H&P ADULT - ALLERGIC/IMMUNOLOGIC
Patient's culture suggests asymptomatic bacterial colonization with group B strep.  Would recommend patient continue her course of antibiotics.  Follow-up with urology as planned in clinic encounter.   negative

## 2021-12-15 ENCOUNTER — OUTPATIENT (OUTPATIENT)
Dept: OUTPATIENT SERVICES | Facility: HOSPITAL | Age: 66
LOS: 1 days | End: 2021-12-15
Payer: MEDICARE

## 2021-12-15 ENCOUNTER — APPOINTMENT (OUTPATIENT)
Dept: ULTRASOUND IMAGING | Facility: CLINIC | Age: 66
End: 2021-12-15
Payer: MEDICARE

## 2021-12-15 DIAGNOSIS — Z90.49 ACQUIRED ABSENCE OF OTHER SPECIFIED PARTS OF DIGESTIVE TRACT: Chronic | ICD-10-CM

## 2021-12-15 DIAGNOSIS — E11.9 TYPE 2 DIABETES MELLITUS WITHOUT COMPLICATIONS: ICD-10-CM

## 2021-12-15 PROCEDURE — 76700 US EXAM ABDOM COMPLETE: CPT | Mod: 26

## 2021-12-15 PROCEDURE — 76700 US EXAM ABDOM COMPLETE: CPT

## 2021-12-17 ENCOUNTER — NON-APPOINTMENT (OUTPATIENT)
Age: 66
End: 2021-12-17

## 2021-12-18 LAB
CULTURE RESULTS: SIGNIFICANT CHANGE UP
SPECIMEN SOURCE: SIGNIFICANT CHANGE UP

## 2021-12-19 ENCOUNTER — NON-APPOINTMENT (OUTPATIENT)
Age: 66
End: 2021-12-19

## 2021-12-19 ENCOUNTER — INPATIENT (INPATIENT)
Facility: HOSPITAL | Age: 66
LOS: 17 days | Discharge: NURSING, REHAB OR LONG-TERM CARE | DRG: 177 | End: 2022-01-06
Attending: HOSPITALIST | Admitting: HOSPITALIST
Payer: MEDICARE

## 2021-12-19 VITALS
HEIGHT: 73 IN | TEMPERATURE: 99 F | OXYGEN SATURATION: 96 % | DIASTOLIC BLOOD PRESSURE: 66 MMHG | HEART RATE: 88 BPM | RESPIRATION RATE: 16 BRPM | SYSTOLIC BLOOD PRESSURE: 103 MMHG | WEIGHT: 210.1 LBS

## 2021-12-19 DIAGNOSIS — Z90.49 ACQUIRED ABSENCE OF OTHER SPECIFIED PARTS OF DIGESTIVE TRACT: Chronic | ICD-10-CM

## 2021-12-19 LAB
ALBUMIN SERPL ELPH-MCNC: 3.4 G/DL — SIGNIFICANT CHANGE UP (ref 3.3–5)
ALP SERPL-CCNC: 152 U/L — HIGH (ref 40–120)
ALT FLD-CCNC: 66 U/L — HIGH (ref 10–45)
ANION GAP SERPL CALC-SCNC: 15 MMOL/L — SIGNIFICANT CHANGE UP (ref 5–17)
AST SERPL-CCNC: 45 U/L — HIGH (ref 10–40)
BASOPHILS # BLD AUTO: 0 K/UL — SIGNIFICANT CHANGE UP (ref 0–0.2)
BASOPHILS NFR BLD AUTO: 0 % — SIGNIFICANT CHANGE UP (ref 0–2)
BILIRUB SERPL-MCNC: 0.3 MG/DL — SIGNIFICANT CHANGE UP (ref 0.2–1.2)
BUN SERPL-MCNC: 34 MG/DL — HIGH (ref 7–23)
CALCIUM SERPL-MCNC: 8.9 MG/DL — SIGNIFICANT CHANGE UP (ref 8.4–10.5)
CHLORIDE SERPL-SCNC: 100 MMOL/L — SIGNIFICANT CHANGE UP (ref 96–108)
CK SERPL-CCNC: 80 U/L — SIGNIFICANT CHANGE UP (ref 30–200)
CO2 SERPL-SCNC: 19 MMOL/L — LOW (ref 22–31)
CREAT SERPL-MCNC: 1.93 MG/DL — HIGH (ref 0.5–1.3)
EOSINOPHIL # BLD AUTO: 0 K/UL — SIGNIFICANT CHANGE UP (ref 0–0.5)
EOSINOPHIL NFR BLD AUTO: 0 % — SIGNIFICANT CHANGE UP (ref 0–6)
GLUCOSE SERPL-MCNC: 229 MG/DL — HIGH (ref 70–99)
HCT VFR BLD CALC: 22.9 % — LOW (ref 39–50)
HGB BLD-MCNC: 7.3 G/DL — LOW (ref 13–17)
LIDOCAIN IGE QN: 10 U/L — SIGNIFICANT CHANGE UP (ref 7–60)
LYMPHOCYTES # BLD AUTO: 0.45 K/UL — LOW (ref 1–3.3)
LYMPHOCYTES # BLD AUTO: 12.3 % — LOW (ref 13–44)
MANUAL SMEAR VERIFICATION: SIGNIFICANT CHANGE UP
MCHC RBC-ENTMCNC: 26.4 PG — LOW (ref 27–34)
MCHC RBC-ENTMCNC: 31.9 GM/DL — LOW (ref 32–36)
MCV RBC AUTO: 83 FL — SIGNIFICANT CHANGE UP (ref 80–100)
MONOCYTES # BLD AUTO: 0.16 K/UL — SIGNIFICANT CHANGE UP (ref 0–0.9)
MONOCYTES NFR BLD AUTO: 4.4 % — SIGNIFICANT CHANGE UP (ref 2–14)
NEUTROPHILS # BLD AUTO: 3.02 K/UL — SIGNIFICANT CHANGE UP (ref 1.8–7.4)
NEUTROPHILS NFR BLD AUTO: 81.6 % — HIGH (ref 43–77)
NEUTS BAND # BLD: 1.7 % — SIGNIFICANT CHANGE UP (ref 0–8)
PLAT MORPH BLD: NORMAL — SIGNIFICANT CHANGE UP
PLATELET # BLD AUTO: 141 K/UL — LOW (ref 150–400)
POTASSIUM SERPL-MCNC: 4.2 MMOL/L — SIGNIFICANT CHANGE UP (ref 3.5–5.3)
POTASSIUM SERPL-SCNC: 4.2 MMOL/L — SIGNIFICANT CHANGE UP (ref 3.5–5.3)
PROT SERPL-MCNC: 6.6 G/DL — SIGNIFICANT CHANGE UP (ref 6–8.3)
RBC # BLD: 2.76 M/UL — LOW (ref 4.2–5.8)
RBC # FLD: 16.8 % — HIGH (ref 10.3–14.5)
RBC BLD AUTO: SIGNIFICANT CHANGE UP
SODIUM SERPL-SCNC: 134 MMOL/L — LOW (ref 135–145)
TROPONIN T, HIGH SENSITIVITY RESULT: 43 NG/L — SIGNIFICANT CHANGE UP (ref 0–51)
WBC # BLD: 3.63 K/UL — LOW (ref 3.8–10.5)
WBC # FLD AUTO: 3.63 K/UL — LOW (ref 3.8–10.5)

## 2021-12-19 PROCEDURE — 70450 CT HEAD/BRAIN W/O DYE: CPT | Mod: 26,MA

## 2021-12-19 PROCEDURE — 99285 EMERGENCY DEPT VISIT HI MDM: CPT

## 2021-12-19 PROCEDURE — 71250 CT THORAX DX C-: CPT | Mod: 26,MA

## 2021-12-19 PROCEDURE — 72170 X-RAY EXAM OF PELVIS: CPT | Mod: 26

## 2021-12-19 NOTE — ED ADULT NURSE NOTE - OBJECTIVE STATEMENT
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 Patient comes to the ER from home after an unwitnessed fall around 15:30 today. Patient last remembers laying in bed, however he was found on the floor of his room with his walker by his son with bowel and urinary incontinence. Patient does not recall walking, fall or cause of fall. As per patient he did not hit his head falling, landed on his back. Denies anticoagulation use. Patient also had a fall last week due to unsteady gait- reports "2 right bruised ribs". A&Ox4, breathing spontaneous and unlabored, palpable pulses, skin color normal for ethnicity, neuro intact, +b/l PERRL. Denies chest pain, shortness of breath, fever, chills. Bed locked and in lowest position for safety.

## 2021-12-19 NOTE — ED PROVIDER NOTE - ATTENDING CONTRIBUTION TO CARE
Fall vs syncope, diagnosed with outpatient PNA, on antibiotics.  Liver transplant on immunosuppressives.  Fell while walking to the bathroom, has no memory of falling or why he fell.  Endorsing increased frequency of falls recently with lingering R chest wall pain after prior fall, no new chest pains/palps tonight with fall.    A 14 point review of systems is negative except as in HPI or otherwise documented.    Exam:  General: Patient well appearing, vital signs within normal limits  HEENT: airway patent with moist mucous membranes  Cardiac: RRR S1/S2 with strong peripheral pulses  Respiratory: lungs clear without respiratory distress, mild R chest wall tenderness without crepitus or deformity  GI: abdomen soft, non tender, non distended  Neuro: no gross neurologic deficits  MSK: no midline spinal tenderness, no long bone tenderness or deformity, stable pelvis  Skin: warm, well perfused  Psych: normal mood and affect    Patient presenting with fall vs syncope, also in setting of reported PNA - plan for labs, CT head to screen for occult intracranial injury, CT chest for PNA also to r/o subacute R sided rib fractures given ongoing pain from prior fall, labs, consult transplant surgery, likely admission for further workup given unclear etiology of recurrent falls in setting of complex PMH

## 2021-12-19 NOTE — ED PROVIDER NOTE - CLINICAL SUMMARY MEDICAL DECISION MAKING FREE TEXT BOX
65 yo M PMHx DDLT (07/2020, c/b post-op VRE bacteremia), prior ESBL e.coli prostate abscess, R heel OM (12/2020), C. Diff (12/2020), DM, MGUS, and HFpEF, decompensated JEAN cirrhosis s/p liver transplant 2020 presenting with LOC and unwitnessed fall. No signs of external trauma, no c-spine ttp. AOx4, neuro exam benign. Concern for seizure vs cardiac etiology of syncope. Will obtain head CT, cardiac workup. Will reach out to transplant team.

## 2021-12-19 NOTE — ED ADULT NURSE NOTE - NSIMPLEMENTINTERV_GEN_ALL_ED
Implemented All Fall Risk Interventions:  [place] to call system. Call bell, personal items and telephone within reach. Instruct patient to call for assistance. Room bathroom lighting operational. Non-slip footwear when patient is off stretcher. Physically safe environment: no spills, clutter or unnecessary equipment. Stretcher in lowest position, wheels locked, appropriate side rails in place. Provide visual cue, wrist band, yellow gown, etc. Monitor gait and stability. Monitor for mental status changes and reorient to person, place, and time. Review medications for side effects contributing to fall risk. Reinforce activity limits and safety measures with patient and family.

## 2021-12-19 NOTE — ED PROVIDER NOTE - OBJECTIVE STATEMENT
67 y/o M PMHx DDLT (07/2020, c/b post-op VRE bacteremia), prior ESBL e.coli prostate abscess, R heel OM (12/2020), C. Diff (12/2020), DM, MGUS, and HFpEF, presents with 67 y/o M PMHx DDLT (07/2020, c/b post-op VRE bacteremia), prior ESBL e.coli prostate abscess, R heel OM (12/2020), C. Diff (12/2020), DM, MGUS, and HFpEF, decompensated JEAN cirrhosis status post liver transplant 2020 for JEAN presents status post unwitnessed fall and LOC 7 hours ago. Patient apparently called his son, who found patient on the floor unconscious - last thing patient remembers is waking up in bed after son found him on the ground. Patient does not remember calling his son and he denies any prior hx seizures. He denies tongue biting but does endorse bowel/urinary incontinence. He denies any chest pain or SOB, headache, or nausea/vomiting. Uses walker to ambulate at baseline. Denies any other bodily pain or injury.     allergies: codeine  PMD: Dr Dillan Coto

## 2021-12-19 NOTE — ED ADULT TRIAGE NOTE - CHIEF COMPLAINT QUOTE
Fall, pt does not remember incident, pt wife reports multiple falls, wife states pt has been progressively becoming more forgetful. No AC use Pt AXOX3 on arrival to ED, poor historian of events

## 2021-12-20 DIAGNOSIS — Z94.4 LIVER TRANSPLANT STATUS: ICD-10-CM

## 2021-12-20 DIAGNOSIS — Z29.9 ENCOUNTER FOR PROPHYLACTIC MEASURES, UNSPECIFIED: ICD-10-CM

## 2021-12-20 DIAGNOSIS — U07.1 COVID-19: ICD-10-CM

## 2021-12-20 DIAGNOSIS — N39.0 URINARY TRACT INFECTION, SITE NOT SPECIFIED: ICD-10-CM

## 2021-12-20 DIAGNOSIS — E11.9 TYPE 2 DIABETES MELLITUS WITHOUT COMPLICATIONS: ICD-10-CM

## 2021-12-20 DIAGNOSIS — J18.9 PNEUMONIA, UNSPECIFIED ORGANISM: ICD-10-CM

## 2021-12-20 DIAGNOSIS — R74.01 ELEVATION OF LEVELS OF LIVER TRANSAMINASE LEVELS: ICD-10-CM

## 2021-12-20 DIAGNOSIS — R55 SYNCOPE AND COLLAPSE: ICD-10-CM

## 2021-12-20 DIAGNOSIS — G47.33 OBSTRUCTIVE SLEEP APNEA (ADULT) (PEDIATRIC): ICD-10-CM
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
## 2021-12-20 DIAGNOSIS — E78.5 HYPERLIPIDEMIA, UNSPECIFIED: ICD-10-CM

## 2021-12-20 LAB
ALBUMIN SERPL ELPH-MCNC: 3.3 G/DL — SIGNIFICANT CHANGE UP (ref 3.3–5)
ALP SERPL-CCNC: 142 U/L — HIGH (ref 40–120)
ALT FLD-CCNC: 59 U/L — HIGH (ref 10–45)
APPEARANCE UR: ABNORMAL
AST SERPL-CCNC: 40 U/L — SIGNIFICANT CHANGE UP (ref 10–40)
BACTERIA # UR AUTO: NEGATIVE — SIGNIFICANT CHANGE UP
BILIRUB DIRECT SERPL-MCNC: 0.1 MG/DL — SIGNIFICANT CHANGE UP (ref 0–0.3)
BILIRUB INDIRECT FLD-MCNC: 0.2 MG/DL — SIGNIFICANT CHANGE UP (ref 0.2–1)
BILIRUB SERPL-MCNC: 0.3 MG/DL — SIGNIFICANT CHANGE UP (ref 0.2–1.2)
BILIRUB UR-MCNC: NEGATIVE — SIGNIFICANT CHANGE UP
COLOR SPEC: YELLOW — SIGNIFICANT CHANGE UP
CREAT SERPL-MCNC: 1.95 MG/DL — HIGH (ref 0.5–1.3)
DIFF PNL FLD: ABNORMAL
EPI CELLS # UR: 1 /HPF — SIGNIFICANT CHANGE UP
GLUCOSE BLDC GLUCOMTR-MCNC: 102 MG/DL — HIGH (ref 70–99)
GLUCOSE BLDC GLUCOMTR-MCNC: 184 MG/DL — HIGH (ref 70–99)
GLUCOSE BLDC GLUCOMTR-MCNC: 60 MG/DL — LOW (ref 70–99)
GLUCOSE BLDC GLUCOMTR-MCNC: 65 MG/DL — LOW (ref 70–99)
GLUCOSE UR QL: ABNORMAL
HYALINE CASTS # UR AUTO: 1 /LPF — SIGNIFICANT CHANGE UP (ref 0–2)
INR BLD: 1.18 RATIO — HIGH (ref 0.88–1.16)
KETONES UR-MCNC: NEGATIVE — SIGNIFICANT CHANGE UP
LDH SERPL L TO P-CCNC: 334 U/L — HIGH (ref 50–242)
LEUKOCYTE ESTERASE UR-ACNC: ABNORMAL
NITRITE UR-MCNC: NEGATIVE — SIGNIFICANT CHANGE UP
PH UR: 6 — SIGNIFICANT CHANGE UP (ref 5–8)
PROCALCITONIN SERPL-MCNC: 0.33 NG/ML — HIGH (ref 0.02–0.1)
PROT SERPL-MCNC: 6.4 G/DL — SIGNIFICANT CHANGE UP (ref 6–8.3)
PROT UR-MCNC: ABNORMAL
PROTHROM AB SERPL-ACNC: 14 SEC — HIGH (ref 10.6–13.6)
RAPID RVP RESULT: DETECTED
RBC CASTS # UR COMP ASSIST: 6 /HPF — HIGH (ref 0–4)
SARS-COV-2 RNA SPEC QL NAA+PROBE: DETECTED
SARS-COV-2 RNA SPEC QL NAA+PROBE: DETECTED
SP GR SPEC: 1.02 — SIGNIFICANT CHANGE UP (ref 1.01–1.02)
TROPONIN T, HIGH SENSITIVITY RESULT: 42 NG/L — SIGNIFICANT CHANGE UP (ref 0–51)
UROBILINOGEN FLD QL: NEGATIVE — SIGNIFICANT CHANGE UP
WBC UR QL: 261 /HPF — HIGH (ref 0–5)

## 2021-12-20 PROCEDURE — 99223 1ST HOSP IP/OBS HIGH 75: CPT | Mod: GC

## 2021-12-20 PROCEDURE — 99223 1ST HOSP IP/OBS HIGH 75: CPT

## 2021-12-20 PROCEDURE — 99222 1ST HOSP IP/OBS MODERATE 55: CPT

## 2021-12-20 RX ORDER — REMDESIVIR 5 MG/ML
100 INJECTION INTRAVENOUS EVERY 24 HOURS
Refills: 0 | Status: COMPLETED | OUTPATIENT
Start: 2021-12-21 | End: 2021-12-24

## 2021-12-20 RX ORDER — DEXTROSE 50 % IN WATER 50 %
15 SYRINGE (ML) INTRAVENOUS ONCE
Refills: 0 | Status: DISCONTINUED | OUTPATIENT
Start: 2021-12-20 | End: 2022-01-06

## 2021-12-20 RX ORDER — INSULIN LISPRO 100/ML
7 VIAL (ML) SUBCUTANEOUS
Refills: 0 | Status: DISCONTINUED | OUTPATIENT
Start: 2021-12-20 | End: 2021-12-20

## 2021-12-20 RX ORDER — GLUCAGON INJECTION, SOLUTION 0.5 MG/.1ML
1 INJECTION, SOLUTION SUBCUTANEOUS ONCE
Refills: 0 | Status: DISCONTINUED | OUTPATIENT
Start: 2021-12-20 | End: 2022-01-06

## 2021-12-20 RX ORDER — DEXTROSE 50 % IN WATER 50 %
12.5 SYRINGE (ML) INTRAVENOUS ONCE
Refills: 0 | Status: DISCONTINUED | OUTPATIENT
Start: 2021-12-20 | End: 2022-01-06

## 2021-12-20 RX ORDER — ASPIRIN/CALCIUM CARB/MAGNESIUM 324 MG
81 TABLET ORAL DAILY
Refills: 0 | Status: DISCONTINUED | OUTPATIENT
Start: 2021-12-20 | End: 2021-12-21

## 2021-12-20 RX ORDER — INSULIN GLARGINE 100 [IU]/ML
12 INJECTION, SOLUTION SUBCUTANEOUS AT BEDTIME
Refills: 0 | Status: DISCONTINUED | OUTPATIENT
Start: 2021-12-20 | End: 2021-12-20

## 2021-12-20 RX ORDER — CEFEPIME 1 G/1
1000 INJECTION, POWDER, FOR SOLUTION INTRAMUSCULAR; INTRAVENOUS ONCE
Refills: 0 | Status: COMPLETED | OUTPATIENT
Start: 2021-12-20 | End: 2021-12-20

## 2021-12-20 RX ORDER — ATORVASTATIN CALCIUM 80 MG/1
80 TABLET, FILM COATED ORAL AT BEDTIME
Refills: 0 | Status: DISCONTINUED | OUTPATIENT
Start: 2021-12-20 | End: 2021-12-22

## 2021-12-20 RX ORDER — NYSTATIN 500MM UNIT
500000 POWDER (EA) MISCELLANEOUS
Refills: 0 | Status: DISCONTINUED | OUTPATIENT
Start: 2021-12-20 | End: 2022-01-06

## 2021-12-20 RX ORDER — DEXTROSE 50 % IN WATER 50 %
25 SYRINGE (ML) INTRAVENOUS ONCE
Refills: 0 | Status: DISCONTINUED | OUTPATIENT
Start: 2021-12-20 | End: 2022-01-06

## 2021-12-20 RX ORDER — REMDESIVIR 5 MG/ML
200 INJECTION INTRAVENOUS EVERY 24 HOURS
Refills: 0 | Status: COMPLETED | OUTPATIENT
Start: 2021-12-20 | End: 2021-12-20

## 2021-12-20 RX ORDER — MYCOPHENOLATE MOFETIL 250 MG/1
500 CAPSULE ORAL
Refills: 0 | Status: DISCONTINUED | OUTPATIENT
Start: 2021-12-20 | End: 2021-12-20

## 2021-12-20 RX ORDER — SERTRALINE 25 MG/1
100 TABLET, FILM COATED ORAL DAILY
Refills: 0 | Status: DISCONTINUED | OUTPATIENT
Start: 2021-12-20 | End: 2021-12-21

## 2021-12-20 RX ORDER — SODIUM CHLORIDE 9 MG/ML
1000 INJECTION, SOLUTION INTRAVENOUS
Refills: 0 | Status: DISCONTINUED | OUTPATIENT
Start: 2021-12-20 | End: 2022-01-06

## 2021-12-20 RX ORDER — ACETAMINOPHEN 500 MG
650 TABLET ORAL ONCE
Refills: 0 | Status: COMPLETED | OUTPATIENT
Start: 2021-12-20 | End: 2021-12-20

## 2021-12-20 RX ORDER — INSULIN LISPRO 100/ML
VIAL (ML) SUBCUTANEOUS AT BEDTIME
Refills: 0 | Status: DISCONTINUED | OUTPATIENT
Start: 2021-12-20 | End: 2021-12-30

## 2021-12-20 RX ORDER — REMDESIVIR 5 MG/ML
INJECTION INTRAVENOUS
Refills: 0 | Status: COMPLETED | OUTPATIENT
Start: 2021-12-20 | End: 2021-12-24

## 2021-12-20 RX ORDER — VALGANCICLOVIR 450 MG/1
450 TABLET, FILM COATED ORAL DAILY
Refills: 0 | Status: DISCONTINUED | OUTPATIENT
Start: 2021-12-20 | End: 2022-01-06

## 2021-12-20 RX ORDER — EVEROLIMUS 10 MG/1
4 TABLET ORAL
Refills: 0 | Status: DISCONTINUED | OUTPATIENT
Start: 2021-12-20 | End: 2021-12-20

## 2021-12-20 RX ORDER — CEFTRIAXONE 500 MG/1
1000 INJECTION, POWDER, FOR SOLUTION INTRAMUSCULAR; INTRAVENOUS EVERY 24 HOURS
Refills: 0 | Status: DISCONTINUED | OUTPATIENT
Start: 2021-12-20 | End: 2021-12-20

## 2021-12-20 RX ORDER — INSULIN LISPRO 100/ML
VIAL (ML) SUBCUTANEOUS
Refills: 0 | Status: DISCONTINUED | OUTPATIENT
Start: 2021-12-20 | End: 2021-12-30

## 2021-12-20 RX ORDER — HEPARIN SODIUM 5000 [USP'U]/ML
5000 INJECTION INTRAVENOUS; SUBCUTANEOUS EVERY 8 HOURS
Refills: 0 | Status: DISCONTINUED | OUTPATIENT
Start: 2021-12-20 | End: 2021-12-21

## 2021-12-20 RX ORDER — REMDESIVIR 5 MG/ML
INJECTION INTRAVENOUS
Refills: 0 | Status: DISCONTINUED | OUTPATIENT
Start: 2021-12-20 | End: 2021-12-20

## 2021-12-20 RX ORDER — EVEROLIMUS 10 MG/1
3 TABLET ORAL
Refills: 0 | Status: DISCONTINUED | OUTPATIENT
Start: 2021-12-20 | End: 2021-12-20

## 2021-12-20 RX ORDER — CEFEPIME 1 G/1
1000 INJECTION, POWDER, FOR SOLUTION INTRAMUSCULAR; INTRAVENOUS EVERY 12 HOURS
Refills: 0 | Status: DISCONTINUED | OUTPATIENT
Start: 2021-12-21 | End: 2021-12-22

## 2021-12-20 RX ORDER — CEFEPIME 1 G/1
INJECTION, POWDER, FOR SOLUTION INTRAMUSCULAR; INTRAVENOUS
Refills: 0 | Status: DISCONTINUED | OUTPATIENT
Start: 2021-12-20 | End: 2021-12-22

## 2021-12-20 RX ORDER — SODIUM CHLORIDE 9 MG/ML
250 INJECTION INTRAMUSCULAR; INTRAVENOUS; SUBCUTANEOUS
Refills: 0 | Status: COMPLETED | OUTPATIENT
Start: 2021-12-20 | End: 2021-12-20

## 2021-12-20 RX ORDER — REMDESIVIR 5 MG/ML
200 INJECTION INTRAVENOUS EVERY 24 HOURS
Refills: 0 | Status: DISCONTINUED | OUTPATIENT
Start: 2021-12-20 | End: 2021-12-20

## 2021-12-20 RX ADMIN — Medication 650 MILLIGRAM(S): at 12:50

## 2021-12-20 RX ADMIN — CEFEPIME 100 MILLIGRAM(S): 1 INJECTION, POWDER, FOR SOLUTION INTRAMUSCULAR; INTRAVENOUS at 21:36

## 2021-12-20 RX ADMIN — Medication 500000 UNIT(S): at 23:11

## 2021-12-20 RX ADMIN — Medication 1: at 13:02

## 2021-12-20 RX ADMIN — Medication 1 TABLET(S): at 13:05

## 2021-12-20 RX ADMIN — Medication 7 UNIT(S): at 13:01

## 2021-12-20 RX ADMIN — Medication 650 MILLIGRAM(S): at 17:17

## 2021-12-20 RX ADMIN — ATORVASTATIN CALCIUM 80 MILLIGRAM(S): 80 TABLET, FILM COATED ORAL at 21:37

## 2021-12-20 RX ADMIN — HEPARIN SODIUM 5000 UNIT(S): 5000 INJECTION INTRAVENOUS; SUBCUTANEOUS at 21:37

## 2021-12-20 RX ADMIN — VALGANCICLOVIR 450 MILLIGRAM(S): 450 TABLET, FILM COATED ORAL at 13:04

## 2021-12-20 RX ADMIN — REMDESIVIR 500 MILLIGRAM(S): 5 INJECTION INTRAVENOUS at 18:14

## 2021-12-20 RX ADMIN — HEPARIN SODIUM 5000 UNIT(S): 5000 INJECTION INTRAVENOUS; SUBCUTANEOUS at 13:03

## 2021-12-20 RX ADMIN — Medication 81 MILLIGRAM(S): at 13:05

## 2021-12-20 RX ADMIN — SODIUM CHLORIDE 310 MILLILITER(S): 9 INJECTION INTRAMUSCULAR; INTRAVENOUS; SUBCUTANEOUS at 14:38

## 2021-12-20 RX ADMIN — Medication 500000 UNIT(S): at 13:04

## 2021-12-20 RX ADMIN — SERTRALINE 100 MILLIGRAM(S): 25 TABLET, FILM COATED ORAL at 13:04

## 2021-12-20 RX ADMIN — Medication 500000 UNIT(S): at 18:14

## 2021-12-20 NOTE — H&P ADULT - NSHPPHYSICALEXAM_GEN_ALL_CORE
VITALS:   T(C): 37.1 (12-20-21 @ 06:03), Max: 37.6 (12-20-21 @ 00:22)  HR: 88 (12-20-21 @ 06:03) (82 - 88)  BP: 106/60 (12-20-21 @ 06:03) (103/66 - 166/70)  RR: 18 (12-20-21 @ 06:03) (16 - 22)  SpO2: 98% (12-20-21 @ 06:03) (96% - 99%)    PHYSICAL EXAM:     GENERAL: NAD, lying in bed comfortably on room air  HEAD:  Atraumatic, Normocephalic  EYES: EOMI, PERRLA, conjunctiva and sclera clear  ENT: Moist mucous membranes  NECK: Supple, No JVD  CHEST/LUNG: Clear to auscultation bilaterally; No rales, rhonchi, wheezing, or rubs. Unlabored respirations  HEART: Regular rate and rhythm; No murmurs, rubs, or gallops  ABDOMEN: normal bowel sounds; Soft, nontender, nondistended  EXTREMITIES:  2+ Peripheral Pulses, brisk capillary refill. No clubbing, cyanosis, or edema  Neurological:  A&Ox3, no focal deficits   SKIN: No rashes or lesions  PSYCH: normal affect and mood

## 2021-12-20 NOTE — H&P ADULT - PROBLEM SELECTOR PLAN 5
- Transplant hepatology consulted  - Transplant ID consulted  - prednisone 5  - cellcept 500 q12  - bactrim, valcyte for prophylaxis Mildly elevated admission AST/ALT at 45/66 respectively. Recent ultrasound showed good graft flow.   - Transplant hepatology consult

## 2021-12-20 NOTE — H&P ADULT - NSHPREVIEWOFSYSTEMS_GEN_ALL_CORE
REVIEW OF SYSTEMS:  CONSTITUTIONAL: No fever, chills, night sweats, or fatigue  EYES: No eye pain, visual disturbances, or discharge  ENMT:  No difficulty hearing, tinnitus, vertigo; No sinus or throat pain  NECK: No pain or stiffness  RESPIRATORY: No cough, wheezing, or hemoptysis; No shortness of breath  CARDIOVASCULAR: No chest pain, palpitations, dizziness, or leg swelling  GASTROINTESTINAL: No abdominal or epigastric pain. No nausea, vomiting, or hematemesis; No diarrhea or constipation. No melena or hematochezia.  GENITOURINARY: No dysuria, frequency, hematuria, or incontinence  NEUROLOGICAL: No headaches, memory loss, loss of strength, numbness, or tremors  SKIN: No itching, burning, rashes, or lesions   LYMPH NODES: No enlarged glands  ENDOCRINE: No heat or cold intolerance; No hair loss  MUSCULOSKELETAL: No joint pain or swelling; No muscle, back, or extremity pain  PSYCHIATRIC: No depression, anxiety, mood swings, or difficulty sleeping  HEME/LYMPH: No easy bruising, or bleeding gums  ALLERGY AND IMMUNOLOGIC: No hives or eczema REVIEW OF SYSTEMS:  CONSTITUTIONAL: No fever, chills, night sweats, or fatigue  EYES: No eye pain, visual disturbances, or discharge  ENMT:  No difficulty hearing, tinnitus, vertigo;   NECK: No pain or stiffness  RESPIRATORY: No wheezing, or hemoptysis; No shortness of breath  CARDIOVASCULAR: No chest pain, palpitations, dizziness, or leg swelling  GASTROINTESTINAL: No abdominal or epigastric pain. No nausea, vomiting, or hematemesis; No diarrhea or constipation. No melena or hematochezia.  GENITOURINARY: No dysuria, frequency, hematuria, or incontinence  NEUROLOGICAL: No headaches, memory loss, loss of strength, numbness, or tremors  SKIN: No itching, burning, rashes, or lesions   LYMPH NODES: No enlarged glands  ENDOCRINE: No heat or cold intolerance; No hair loss  MUSCULOSKELETAL: No joint pain or swelling; No muscle, back, or extremity pain  PSYCHIATRIC: No depression, anxiety, mood swings, or difficulty sleeping  HEME/LYMPH: No easy bruising, or bleeding gums  ALLERGY AND IMMUNOLOGIC: No hives or eczema

## 2021-12-20 NOTE — H&P ADULT - PROBLEM SELECTOR PLAN 9
DVT: hep subq  Diet: consistent carb, DASH  Dispo: Home 20 rosuvastatin, aspiring 81 for preventative medicine  - atorvastatin 80  - aspirin 81

## 2021-12-20 NOTE — CONSULT NOTE ADULT - ATTENDING COMMENTS
Patient seen and examined - COVID PCR +  No headache - no recollection of event.  EOMI, VFF  Face symmetric   No drift  Strength full throughout  Agree with plan as documented above

## 2021-12-20 NOTE — H&P ADULT - PROBLEM SELECTOR PLAN 4
Mildly elevated admission AST/ALT at 45/66 respectively. Recent ultrasound showed good graft flow  - Transplant hepatology consult Mildly elevated admission AST/ALT at 45/66 respectively. Recent ultrasound showed good graft flow.   - Transplant hepatology consult CT chest concerning for possible beginnings of multifocal pneumonia. May be the beginnings of covid pneumonia vs everolimus side effect  - See above (coronavirus)  - Pulm consult: are lung findings secondary to covid or everolimus side effect?  - Will CTM for now

## 2021-12-20 NOTE — H&P ADULT - NSHPLABSRESULTS_GEN_ALL_CORE
LABS: Personally reviewed labs, imaging, and ECG                          7.3    3.63  )-----------( 141      ( 19 Dec 2021 22:41 )             22.9       12-19    134<L>  |  100  |  34<H>  ----------------------------<  229<H>  4.2   |  19<L>  |  1.93<H>    Ca    8.9      19 Dec 2021 22:41    TPro  6.6  /  Alb  3.4  /  TBili  0.3  /  DBili  x   /  AST  45<H>  /  ALT  66<H>  /  AlkPhos  152<H>  12-19       LIVER FUNCTIONS - ( 19 Dec 2021 22:41 )  Alb: 3.4 g/dL / Pro: 6.6 g/dL / ALK PHOS: 152 U/L / ALT: 66 U/L / AST: 45 U/L / GGT: x                            Lactate Trend      CARDIAC MARKERS ( 19 Dec 2021 22:41 )  x     / x     / 80 U/L / x     / x            CAPILLARY BLOOD GLUCOSE                RADIOLOGY & ADDITIONAL TESTS:     < from: CT Chest No Cont (12.19.21 @ 22:49) >    IMPRESSION:    No CT evidence of acute traumatic sequelae within the chest.    Patchy bilateral nodular and groundglass airspace opacities, most   prominent within the left upper and lower lobes, concerning for   multifocal pneumonia. Right apical irregular nodular opacity, new since   12/22/2020. Findings may represent additional focus of   infection/inflammation, however short-term follow-up after treatment, in   3 months is recommended.    --- End of Report ---    < end of copied text >    < from: CT Head No Cont (12.19.21 @ 22:49) >    IMPRESSION:    No acute intracranial hemorrhage, mass effect or calvarial fracture.    Paranasal sinus mucosal thickening and opacification. Correlate for   sinusitis.    --- End of Report ---    < end of copied text >    < from: Xray Pelvis AP only (12.19.21 @ 23:06) >    FINDINGS:    No acute fracture or dislocation.  Mild bilateral hip joint space narrowing.    IMPRESSION:  No acute fracture or dislocation.    < end of copied text >

## 2021-12-20 NOTE — PHYSICAL THERAPY INITIAL EVALUATION ADULT - ADDITIONAL COMMENTS
X-Ray Pelvis: No acute displaced fracture. X-Ray Pelvis: No acute displaced fracture.  CT Head: No acute intracranial hemorrhage, mass effect or calvarial fracture. Paranasal sinus mucosal thickening and opacification. Correlate for sinusitis. Pt reports living with his spouse and son. No steps to negotiate. PTA, pt was independent with all mobility & ADL's with use of rolling walker. Pt owns rolling walker, wheelchair, straight cane, shower chair.    X-Ray Pelvis: No acute displaced fracture.  CT Head: No acute intracranial hemorrhage, mass effect or calvarial fracture. Paranasal sinus mucosal thickening and opacification. Correlate for sinusitis.

## 2021-12-20 NOTE — H&P ADULT - PROBLEM SELECTOR PLAN 1
Unclear etiology of the fall. Patient denies loss of consciousness. Low suspicion of a cardiogenic cause with unremarkable trops and EKG. Patient said that he was "out of it" when he fell but did not lose consciousness. He also said that he urinated on the floor but claims this was intentional. Neurologic cause?   - Neurology following  - EEG  - TTE  - Telemetry  - Infectious work up: Sputum culture, urinalysis, urine culture, blood cultures  - Pt consult Unclear etiology of the fall. Patient denies loss of consciousness. Low suspicion of a cardiogenic cause with unremarkable trops and EKG. Denies mechanical causes. Patient said that he was "out of it" when he fell but did not lose consciousness. He also said that he urinated on the floor but claims this was intentional. Neurologic cause?   - Neurology following  - EEG  - TTE  - Telemetry  - Infectious work up: Sputum culture, urinalysis, urine culture, blood cultures  - Pt consult Unclear etiology of the fall. Patient denies loss of consciousness. Low suspicion of a cardiogenic cause with unremarkable trops and EKG. Denies mechanical causes. Patient said that he was "out of it" when he fell but did not lose consciousness. He also said that he urinated on the floor but claims this was intentional. Neurologic cause?   - Neurology following  - Orthostatic blood pressures  - EEG  - TTE  - Telemetry  - Infectious work up: Sputum culture, urinalysis, urine culture, blood cultures  - Pt consult Unclear etiology of the fall. Patient denies loss of consciousness. Low suspicion of a cardiogenic cause with unremarkable trops and EKG. Denies mechanical causes. Patient said that he was "out of it" when he fell but did not lose consciousness. He also said that he urinated on the floor but claims this was intentional. Neurologic cause?   - Neurology following  - Orthostatic blood pressures were positive for orthostatic hypotension  - EEG  - TTE  - Telemetry  - Infectious work up: Sputum culture, urinalysis, urine culture, blood cultures  - Pt consult Unclear etiology of the fall. Patient denies loss of consciousness. Low suspicion of a cardiogenic cause with unremarkable trops and EKG. Denies mechanical causes. Patient said that he was "out of it" when he fell but did not lose consciousness. He also said that he urinated on the floor but claims this was intentional. Neurologic cause?   - Neurology following  - Orthostatic blood pressures were positive for orthostatic hypotension  - EEG  - TTE  - Telemetry  - Infectious work up: Sputum culture, urine culture, blood cultures  - Pt consult

## 2021-12-20 NOTE — H&P ADULT - ASSESSMENT
67 y/o M PMHx DDLT (07/2020, c/b post-op VRE bacteremia), prior ESBL e.coli prostate abscess, R heel OM (12/2020), C. Diff (12/2020), DM, MGUS, and HFpEF, decompensated JEAN cirrhosis status post liver transplant 2020 for [facility] presents status post unwitnessed fall.

## 2021-12-20 NOTE — CONSULT NOTE ADULT - ASSESSMENT
65 y/o M PMHx DDLT (07/2020, c/b post-op VRE bacteremia), prior ESBL e.coli prostate abscess, R heel OM (12/2020), C. 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 Pulm consulted for abnormal CT chest      Assessment: CT chest reviewed with Chest Radiologist Dr Arreguin. Findings consistent with either COVID or pneumonitis. Not possible to make a distinction between the two based on imaging per Dr Arreguin. In addition, no way to determine the difference between the two from a pulmonary standpoint. No indication for bronchoscopy at this time. Literature review for everolimus reveals it does cause pneumonitis. At this point the patient is not hypoxemic therefore does not require steroids for COVID.     Plan:  Impossible to make a distinction between COVID and everolimus pneumonitis from a pulmonary or radiologic standpoint in the case  Decision to stop everolimus and start steroids per transplant hepatology team and primary team  No indication for bronchoscopy at this time as it will not  or help answer this question  Management of COVID per primary team  Pulm will follow

## 2021-12-20 NOTE — H&P ADULT - PROBLEM SELECTOR PLAN 6
Home insulin regimen 12 lantus and 10 humalog premeal  - Will start 12 lantus and 7 admelog premeal for now  - CTM Home insulin regimen 12 lantus and 10 humalog premeal  - Will start 12 lantus and 7 admelog premeal for now  - SSI  - CTM - Transplant hepatology consulted  - Transplant ID consulted  - prednisone 5  - cellcept 500 q12  - bactrim, valcyte for prophylaxis

## 2021-12-20 NOTE — CONSULT NOTE ADULT - ASSESSMENT
66M s/p with h/o decompensated JEAN cirrhosis s/p liver transplant on 7/2/2021, DM2, HLD, obesity, HFpEF with mild LV diastolic dysfunction, MGUS, chronic anemia, SHENG presenting s/p fall this afternoon    Syncope:  -would admit to Medicine for observation and full w/u given new LOC/urinary incontinence (cardiac vs. seizure)    s/p OLT  -good graft function so far  -no surgical needs at this time  -would involve Hepatology and Dr. Pérez from Transplant ID (?multifocal PNA on CTC)    will follow peripherally. Please do not hesitate to contact us with any questions or concerns    Transplant Surgery  [id number] 66M s/p with h/o decompensated JEAN cirrhosis s/p liver transplant on 7/2/2021, DM2, HLD, obesity, HFpEF with mild LV diastolic dysfunction, MGUS, chronic anemia, SHENG presenting s/p fall this afternoon    Syncope:  -would admit to Medicine for observation and full w/u given new LOC/urinary incontinence (cardiac vs. seizure)    s/p OLT  -good graft function so far  -no surgical needs at this time  -would involve Hepatology and Dr. Pérez from Transplant ID in AM    will follow peripherally. Please do not hesitate to contact us with any questions or concerns    Transplant Surgery  [id number] 66M s/p with h/o decompensated JEAN cirrhosis s/p liver transplant on 7/2/2021, DM2, HLD, obesity, HFpEF with mild LV diastolic dysfunction, MGUS, chronic anemia, SHENG presenting s/p fall this afternoon found to be covid positive    s/p OLT   - Immunosuppression: Recommend holding MMF and Everolimus.  Send STAT Everolimus level - ordered. Continue Prednisone 5mg daily and PPX agents, Nystatin, Bactrim and Valcyte - ordered  - Daily CBC, CMP, INR. Monitor LFTs and renal function.  Creatinine elevated to 1.93 today  - Covid positive: Start Remdesevir per protocol - ordered  - no surgical needs at this time  - would involve Hepatology and Dr. Pérez from Transplant ID today  - remainder of care as per primary team    Please do not hesitate to contact us with any questions or concerns    Transplant Surgery  [phone]

## 2021-12-20 NOTE — H&P ADULT - PROBLEM SELECTOR PLAN 8
DVT: hep subq  Diet: consistent carb, DASH  Dispo: Home 20 rosuvastatin, aspiring 81 for preventative medicine  - atorvastatin 80  - aspirin 81 Home insulin regimen 12 lantus and 10 humalog premeal  - Will start 12 lantus and 7 admelog premeal for now  - SSI  - CTM

## 2021-12-20 NOTE — CONSULT NOTE ADULT - ASSESSMENT
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 Presented to ED 11/19/21 with negative workup; now found to be COVID positive w/ CT chest showing patchy groundglass opacities. Unclear if all pulmonary symptoms and CT findings are from COVID, or whether there is a component from everolimus pneumonitis. This distinction is important, as everolimus pneumonitis would warrant holding everolimus and higher steroid dosing. Will ask pulmonary to evaluate    Recommendations:  - c/w workup for fall per primary team  - f/u everolimus level, ok to continue for now  - pulmonary consult to help evaluate whether pulmonary symptoms and CT findings are from COVID or everolimus pneumonitis  - hold cellcept   - ok to continue with prednisone 5mg qd  - ok to give remdesivir per protocol  - trend CMP, INR  - rest of care per primary team    Hepatology will continue to follow.     Charles Salgado, PGY5  Gastroenterology/Hepatology Fellow  Available on [employer] Teams  [phone] ([facility] Short Range Pager)  [phone] (Long Range Pager)    After 5pm, please contact the on-call GI fellow. [phone]

## 2021-12-20 NOTE — CONSULT NOTE ADULT - SUBJECTIVE AND OBJECTIVE BOX
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 He follows w/ Dr. Medina in hepatology clinic. Since 9/2021 patient having slight rise in his AST/ALT; on everolimus 4mg BID, MMF 500mg BID, prednisone 5mg qd. Concern for recurrent JEAN vs early rejection. Additionally, patient being treated for CMV w/ valcyte 450mg qd with low level of viremia.   Patient presented to ED 11/19/21 for cough and upper respiratory symptoms; CXR, covid negative and patient discharged.   He now presents 12/19/21 after fall at home.  Patient apparently called his son, who found patient on the floor unconscious - last thing patient remembers is waking up in bed after son found him on the ground. Patient does not remember calling his son and he denies any prior hx seizures. He denies tongue biting but does endorse bowel/urinary incontinence. He denies any chest pain or SOB, headache, or nausea/vomiting. Uses walker to ambulate at baseline. Denies any other bodily pain or injury.     On presentation patient HDS. Labs w/ worsening Hg (7.3 from 7.9), thrombocytopenia (plt 149 from 228), improved liver enzymes from prior (45/66). NCHCT wnl. CT chest showing patchy bilateral nodule and groundglass airspace opacities.     Allergies:  codeine (Anaphylaxis)      Home Medications:    Hospital Medications:  aspirin enteric coated 81 milliGRAM(s) Oral daily  atorvastatin 80 milliGRAM(s) Oral at bedtime  benzonatate 100 milliGRAM(s) Oral every 8 hours PRN  casirivimab/imdevimab in sodium chloride 0.9% (EUA) IVPB 100 milliLiter(s) IV Intermittent once  dextrose 40% Gel 15 Gram(s) Oral once  dextrose 5%. 1000 milliLiter(s) IV Continuous <Continuous>  dextrose 5%. 1000 milliLiter(s) IV Continuous <Continuous>  dextrose 50% Injectable 25 Gram(s) IV Push once  dextrose 50% Injectable 12.5 Gram(s) IV Push once  dextrose 50% Injectable 25 Gram(s) IV Push once  glucagon  Injectable 1 milliGRAM(s) IntraMuscular once  heparin   Injectable 5000 Unit(s) SubCutaneous every 8 hours  insulin glargine Injectable (LANTUS) 12 Unit(s) SubCutaneous at bedtime  insulin lispro (ADMELOG) corrective regimen sliding scale   SubCutaneous three times a day before meals  insulin lispro (ADMELOG) corrective regimen sliding scale   SubCutaneous at bedtime  insulin lispro Injectable (ADMELOG) 7 Unit(s) SubCutaneous three times a day before meals  nystatin    Suspension 322075 Unit(s) Oral four times a day  sertraline 100 milliGRAM(s) Oral daily  sodium chloride 0.9%. 250 milliLiter(s) IV Continuous <Continuous>  trimethoprim   80 mG/sulfamethoxazole 400 mG 1 Tablet(s) Oral daily  valGANciclovir 450 milliGRAM(s) Oral daily      PMHX/PSHX:  Diabetes    Hypercholesteremia    Neuropathy    Hypertension    Renal stones    Fatty liver disease, nonalcoholic    Obesity    Hepatic encephalopathy    GERD with esophagitis    GIB (gastrointestinal bleeding)    No significant past surgical history    S/P cholecystectomy        Family history:  No pertinent family history in first degree relatives    Family history of stomach cancer    Family history of hypertension    Family history of type 2 diabetes mellitus        Denies family history of colon cancer/polyps, stomach cancer/polyps, pancreatic cancer/masses, liver cancer/disease, ovarian cancer and endometrial cancer.    Social History:   Tob: Denies  EtOH: Denies  Illicit Drugs: Denies    ROS:     General:  No wt loss, fevers, chills, night sweats, fatigue  Eyes:  Good vision, no reported pain  ENT:  No sore throat, pain, runny nose, dysphagia  CV:  No pain, palpitations, hypo/hypertension  Pulm:  No dyspnea, cough, tachypnea, wheezing  GI:  see HPI  :  No pain, bleeding, incontinence, nocturia  Muscle:  No pain, weakness  Neuro:  No weakness, tingling, memory problems  Psych:  No fatigue, insomnia, mood problems, depression  Endocrine:  No polyuria, polydipsia, cold/heat intolerance  Heme:  No petechiae, ecchymosis, easy bruisability  Skin:  No rash, tattoos, scars, edema    PHYSICAL EXAM:     GENERAL:  No acute distress  HEENT:  NCAT, no scleral icterus   CHEST:  no respiratory distress  HEART:  Regular rate and rhythm  ABDOMEN:  Soft, non-tender, non-distended, normoactive bowel sounds,  no masses  EXTREMITIES: No edema  SKIN:  No rash/erythema/ecchymoses/petechiae/wounds/abscess/warm/dry  NEURO:  Alert and oriented x 3, no asterixis    Vital Signs:  Vital Signs Last 24 Hrs  T(C): 37.7 (20 Dec 2021 12:18), Max: 37.7 (20 Dec 2021 12:18)  T(F): 99.8 (20 Dec 2021 12:18), Max: 99.8 (20 Dec 2021 12:18)  HR: 86 (20 Dec 2021 12:18) (82 - 88)  BP: 146/68 (20 Dec 2021 12:18) (103/66 - 166/70)  BP(mean): --  RR: 18 (20 Dec 2021 06:03) (16 - 22)  SpO2: 93% (20 Dec 2021 12:18) (93% - 99%)  Daily Height in cm: 185.42 (19 Dec 2021 18:38)    Daily     LABS:                        7.3    3.63  )-----------( 141      ( 19 Dec 2021 22:41 )             22.9     Mean Cell Volume: 83.0 fl (12-19-21 @ 22:41)    12-19    134<L>  |  100  |  34<H>  ----------------------------<  229<H>  4.2   |  19<L>  |  1.93<H>    Ca    8.9      19 Dec 2021 22:41    TPro  6.6  /  Alb  3.4  /  TBili  0.3  /  DBili  x   /  AST  45<H>  /  ALT  66<H>  /  AlkPhos  152<H>  12-19    LIVER FUNCTIONS - ( 19 Dec 2021 22:41 )  Alb: 3.4 g/dL / Pro: 6.6 g/dL / ALK PHOS: 152 U/L / ALT: 66 U/L / AST: 45 U/L / GGT: x               Amylase Serum--      Lipase serum10       Ammonia--                          7.3    3.63  )-----------( 141      ( 19 Dec 2021 22:41 )             22.9       Imaging:  CT chest 12/19/21  FINDINGS:    LUNGS AND AIRWAYS: Patent central airways. Bilateral patchy groundglass   and nodular airspace opacities, most prominent within the left upper and   lower lobes. Nonspecific irregular nodular airspace opacity within the   right lung apex, new since 12/23/2020, measuring up to 1.4 cm. PLEURA: No   pleural effusion.  MEDIASTINUM AND RONNIE: No lymphadenopathy.  VESSELS: Metastatic calcifications of the thoracic aorta, which is normal   in caliber.  HEART: Heart size is normal. No pericardial effusion. Aortic valvular and   coronary artery calcification.  CHEST WALL AND LOWER NECK: Within normal limits.  VISUALIZED UPPER ABDOMEN: Cholecystectomy. Trace perihepatic ascites,   decreased since prior examination.  BONES: Degenerative changes.    IMPRESSION:    No CT evidence of acute traumatic sequelae within the chest.    Patchy bilateral nodular and groundglass airspace opacities, most   prominent within the left upper and lower lobes, concerning for   multifocal pneumonia. Right apical irregular nodular opacity, new since   12/22/2020. Findings may represent additional focus of   infection/inflammation, however short-term follow-up after treatment, in   3 months is recommended.      NCT 12/19/21  FINDINGS:    No acute intracranial hemorrhage, mass effect, or midline shift. The   brain demonstrates periventricular hypoattenuation, nonspecific in   etiology but likely representing chronic microvascular changes.  No   abnormal extra-axial fluid collections are present. Chronic appearing   right thalamic lacunar infarct.    The ventricles and sulci demonstrate age appropriate involutional   changes.  The basal cisterns are patent.    Paranasal sinus mucosal thickening most prominent involving the left   maxillary, bilateral ethmoid, and sphenoid sinuses. Trace opacification   of the right inferior mastoid air cells with sclerotic appearance of the   mastoid tip, may represent sequelae of chronic inflammation. Intraorbital   contents are unremarkable. Calvarium is intact.    IMPRESSION:    No acute intracranial hemorrhage, mass effect or calvarial fracture.    Paranasal sinus mucosal thickening and opacification. Correlate for   sinusitis.

## 2021-12-20 NOTE — H&P ADULT - PROBLEM SELECTOR PLAN 2
Cough and sore throat for the past few days. RVP positive for covid at this visit  - Currently in PICU (Covid unit)  - satting well on room air  - cough drops PRN  - CTM

## 2021-12-20 NOTE — CONSULT NOTE ADULT - ATTENDING COMMENTS
65 yo male with h/o JEAN cirrhosis s/p liver transplant 7/20 now on immunosuppressive medications admitted s/p fall. No syncope. Pt found to be COVID positive. Pt had CT chest that shows multilobar small GGO. Currently pt is asymptomatic. Denies fever chills, SOB, chest pain or abnormal sputum production. Pt also with significant PMH of SHENG on CPAP. Pt says he is non compliant with CPAP.  Pt  medication currently includes, predenisone, and 67 yo male with h/o JEAN cirrhosis s/p liver transplant 7/20 now on immunosuppressive medications admitted s/p fall. No syncope. Pt found to be COVID positive. Pt had CT chest that shows multilobar small GGO. Currently pt is asymptomatic. Denies fever chills, SOB, chest pain or abnormal sputum production. Pt also with significant PMH of SHENG on CPAP. Pt says he is non compliant with CPAP.  Pt  medication currently includes, prednisone, everolimus, and Cellcept.    A/P.  1. COVID + patient  with liver transplant on immunosuppression including everolimus , clinically asymptomatic with GGO on CT CHEST. Dx includes Covid pneumonia vs pneumonitis from everolimus. everolimus hs been reported to cause pneumonitis in 8-14% of patients. However, impossible to differentiate between Covid pneumonia and pneumonitis. My guess is that probably Covid pneumonia but cannot prove that at this time. since pt is on room air , would not need additional steroids for Covid. If pneumonitis from everolimus , would be stage one and would not require steroids. At this point would continue with remdesivir and monitor closely. If pt requires 4 liter NC or more, would add decadron.  2. SHENG.  I witnessed pt desaturated to  89-90% 02 saturation while sleeping without chest rise. Also confirmed on respiratory monitoring tracing. Please encourage patient to use CPAP while sleeping.  3. Liver transplant. No objection at this time in continuing everolimus. will defer to transplant team.

## 2021-12-20 NOTE — CONSULT NOTE ADULT - SUBJECTIVE AND OBJECTIVE BOX
TRANSPLANT SURGERY    66M M with obesity, HTN, IDDM2, SHENG, MGUS, chronic anemia, HFpEF, CKD (with baseline Cr 1.8), and decompensated JEAN cirrhosis s/p OLT (7/2/20, CMV high risk with D+/R-) with post-transplant course c/b CNI neurotoxicity necessitating switch to Everolimus, C.diff colitis (12/2020), and Osteomyelitis (12/2020)    -Presented to [facility] ED 11/19, found to have URI symptoms with cough, negative RVP/COVID, CXR negative.    -Found to have low level CMV viremia, for which he was restarted on Valcyte (293-->150 on 11/29).  -c/o of mechanical fall at home, no LOC, no head injury, but with +bruised ribs (no fx on 11/30 CXR as outpatient)  -During this time, he also was found to have a mild transaminitis.  He has been compliant with his immunosuppression regimen and has maintained good levels.  A liver U/S on 12/17 revealed good flow to graft.    He returns after family found him s/p fall at ~3P with +LOC. no head trauma.  +urinary incontinence.  Pt recalls ambulating to bathroom using walker, but nothing afterwards until awoken by son in bed.    In ED, pt is Afebrile, VS stable.  AAOx3. conversant. good historian.  Denies headache, blurry vision, CP, palpitations, SOB, extremity pain or weakness, changes to speech, n/v/d/c, abdominal pain, fevers, chills, urinary changes.  He continues with +URI symptoms, unchanged over the past month.      W/U:  CTH negative  CTC ?multifocal PNA  Pelvic XR with no fxs    Labs:  -glucose appropriate (difficult control as outpatient, A1C 10.9)  -mild neutropenia (on Valcyte)  -downtrending anemia (known chronic anemia)  -LFTs are improving from recent elevation two weeks ago  -cardiac enzymes and tele strip unremarkable so far  -rest of labs appropriate      Plan of care:  See Below      PAST MEDICAL & SURGICAL HISTORY:  Diabetes    Hypercholesteremia    Neuropathy    Hypertension    Renal stones  25 years ago    Fatty liver disease, nonalcoholic    Obesity    Hepatic encephalopathy    GERD with esophagitis  Gastritis &amp; Non Bleeding Ulcers    GIB (gastrointestinal bleeding)    S/P cholecystectomy        Vital Signs Last 24 Hrs  T(C): 37.6 (20 Dec 2021 00:22), Max: 37.6 (20 Dec 2021 00:22)  T(F): 99.7 (20 Dec 2021 00:22), Max: 99.7 (20 Dec 2021 00:22)  HR: 82 (20 Dec 2021 00:22) (82 - 88)  BP: 144/70 (20 Dec 2021 00:22) (103/66 - 166/70)  BP(mean): --  RR: 19 (20 Dec 2021 00:22) (16 - 22)  SpO2: 99% (20 Dec 2021 00:22) (96% - 99%)    I&O's Summary                            7.3    3.63  )-----------( 141      ( 19 Dec 2021 22:41 )             22.9     12-19    134<L>  |  100  |  34<H>  ----------------------------<  229<H>  4.2   |  19<L>  |  1.93<H>    Ca    8.9      19 Dec 2021 22:41    TPro  6.6  /  Alb  3.4  /  TBili  0.3  /  DBili  x   /  AST  45<H>  /  ALT  66<H>  /  AlkPhos  152<H>  12-19        Review of systems  Gen: No weight changes, fatigue, fevers/chills, weakness  Skin: No rashes  Head/Eyes/Ears/Mouth: No headache; Normal hearing; Normal vision w/o blurriness; No sinus pain/discomfort, sore throat. see HPI  Respiratory: No dyspnea, wheezing, hemoptysis. See HPI  CV: No chest pain, PND, orthopnea  GI: no abdominal pain. no diarrhea, constipation, nausea, vomiting, melena, hematochezia  : No increased frequency, dysuria, hematuria, nocturia  MSK: No joint pain/swelling; no back pain; no edema  Neuro: No dizziness/lightheadedness, weakness, seizures, numbness, tingling. see HPI  Heme: No easy bruising or bleeding  Endo: No heat/cold intolerance  Psych: No significant nervousness, anxiety, stress, depression  All other systems were reviewed and are negative, except as noted.      PHYSICAL EXAM:  Constitutional: Well developed / well nourished  Eyes: anicteric, PERRLA  ENMT: nc/at, no thrush  Neck: supple  Respiratory: CTA B/L  Cardiovascular: RRR  Gastrointestinal: Soft abdomen, ND, well healed incisional chevron scar  Genitourinary: Voiding spontaneously  Extremities: no edema, good strength and sensation throughout, previous ulcer healing well.  Vascular: Palpable pulses  Neurological: A&O x3  Skin: no rashes, ulcerations, lesions  Musculoskeletal: Moving all extremities  Psychiatric: Responsive

## 2021-12-20 NOTE — CONSULT NOTE ADULT - ASSESSMENT
Reason For Visit  CAROLIN KIM is here today for a nurse visit for TB reading.      Current Meds   1. Pantoprazole Sodium 40 MG Oral Tablet Delayed Release; TAKE 1 TABLET BY MOUTH   EVERY DAY;   Therapy: 22Mar2017 to (Evaluate:[id number])  Requested for: 18May2017; Last   Rx:[id number] Ordered   2. Quasense 0.15-0.03 MG Oral Tablet;   Therapy: 19Oct2015 to Recorded    Allergies  No Known Drug Allergies    Nurse Documentation    TB was negative.           Signatures   Electronically signed by : BUSHRA Garcia; Oct 26 2017  1:11PM CST           Impression: Unwitnessed fall 2/2 to either cardiac etiology in the setting of possible convulsive syncope vs neurological etiology (  1st time seizure)  Recommendations:   Keep SBP normotensive   Orthostatic vitals  vEEG   Brain MRI w/wo contrast   CXR   Prolactin, LDH   Telemetry   Echo   Correction of electrolytes as needed   U/A   MIVF  rest of management per primary team           Patient is a 65yo Rt handed M with pmh of DDLT (07/2020, c/b post-op VRE bacteremia), prior ESBL e.coli prostate abscess, Rt heel OM (12/2020), C. Diff (12/2020), DM-II, MGUS, and HFpEF, decompensated JEAN cirrhosis (s/p liver transplant 2020) HTN presents to [facility] s/p  unwitnessed fall with + LOC which occurred this afternoon. Denies tongue laceration. There is no postictal confusion noted. Ct head w/o contrast was negative.       Impression: Unwitnessed fall 2/2 to either cardiac etiology in the setting of possible convulsive syncope vs neurological etiology (  1st time seizure)  Recommendations:   Keep SBP normotensive   Orthostatic vitals  vEEG   Brain MRI w/wo contrast   CXR   Prolactin, LDH   Telemetry   Echo   Correction of electrolytes as needed   U/A   MIVF  rest of management per primary team          Case to be discussed with Neurology Attending, Dr. Titus.  Patient is a 65yo Rt handed M with pmh of DDLT (07/2020, c/b post-op VRE bacteremia), prior ESBL e.coli prostate abscess, Rt heel OM (12/2020), C. Diff (12/2020), DM-II, MGUS, and HFpEF, decompensated JEAN cirrhosis (s/p liver transplant 2020) HTN presents to [facility] s/p  unwitnessed fall with + LOC which occurred this afternoon. Denies tongue laceration. There is no postictal confusion noted. Ct head w/o contrast was negative.       Impression: Unwitnessed fall 2/2 to either cardiac etiology in the setting of possible convulsive syncope vs neurological etiology (  1st time seizure)  Recommendations:   Keep SBP normotensive   Orthostatic vitals  vEEG   Brain MRI w/wo contrast   CXR   Procal,  LDH   Telemetry   Echo   Correction of electrolytes as needed   U/A   MIVF  rest of management per primary team          Case to be discussed with Neurology Attending, Dr. Titus.  Patient is a 67yo Rt handed M with pmh of DDLT (07/2020, c/b post-op VRE bacteremia), prior ESBL e.coli prostate abscess, Rt heel OM (12/2020), C. Diff (12/2020), DM-II, MGUS, and HFpEF, decompensated JEAN cirrhosis (s/p liver transplant 2020) HTN presents to [facility] s/p  unwitnessed fall with + LOC which occurred this afternoon. Denies tongue laceration. There is no postictal confusion noted. Ct head w/o contrast was negative.       Impression: Unwitnessed fall 2/2 to either cardiac etiology in the setting of possible convulsive syncope vs neurological etiology (  1st time seizure)  Recommendations:   Keep SBP normotensive   Orthostatic vitals  rEEG   CXR   Procal,  LDH   Telemetry   Echo   Correction of electrolytes as needed   U/A   MIVF  rest of management per primary team          Case to be discussed with Neurology Attending, Dr. Titus.  Patient is a 67yo Rt handed M with pmh of DDLT (07/2020, c/b post-op VRE bacteremia), prior ESBL e.coli prostate abscess, Rt heel OM (12/2020), C. Diff (12/2020), DM-II, MGUS, and HFpEF, decompensated JEAN cirrhosis (s/p liver transplant 2020) HTN presents to [facility] s/p  unwitnessed fall with + LOC which occurred this afternoon. Denies tongue laceration. There is no postictal confusion noted. Ct head w/o contrast was negative.       Impression: Unwitnessed fall 2/2 to either cardiac etiology in the setting of possible convulsive syncope vs neurological etiology (  1st time seizure)  Recommendations:   Keep SBP normotensive   Orthostatic vitals  Brain MRI w/wo contrast  rEEG   CXR   Procal,  LDH   Telemetry   Echo   Correction of electrolytes as needed   U/A   MIVF  rest of management per primary team          Case to be discussed with Neurology Attending, Dr. Titus.

## 2021-12-20 NOTE — H&P ADULT - PROBLEM SELECTOR PLAN 3
CT chest concerning for possible beginnings of multifocal pneumonia. May be the beginnings of covid pneumonia  - Will CTM for now CT chest concerning for possible beginnings of multifocal pneumonia. May be the beginnings of covid pneumonia  - See above (coronavirus  - Will CTM for now CT chest concerning for possible beginnings of multifocal pneumonia. May be the beginnings of covid pneumonia  - See above (coronavirus)  - Will CTM for now CT chest concerning for possible beginnings of multifocal pneumonia. May be the beginnings of covid pneumonia vs everolimus side effect  - See above (coronavirus)  - Pulm consult: are lung findings secondary to covid or everolimus side effect?  - Will CTM for now Urinalysis consistent with UTI.  - Will collect urine culture  - Ceftriaxone empirically 5 days total (12/20 - )

## 2021-12-20 NOTE — H&P ADULT - HISTORY OF PRESENT ILLNESS
67 y/o M PMHx DDLT (07/2020, c/b post-op VRE bacteremia), prior ESBL e.coli prostate abscess, R heel OM (12/2020), C. 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 Patient fell on 12/19/21 at around 3:30 pm. Estimates that he was on the floor for roughly 2-3 minutes and was able to call out to son for assistance. He said before the fall he got out of the bed and started walking to his walker to use the restroom to urinate before falling backwards and landing on his pelvis. He denies any trauma to his head, chest, ribs and only endorses landing on his glutes. He says he normally feels lightheaded when he gets up too quickly so he will wait for a moment for lightheadedness to resolve before moving. He denies feeling lightheaded/dizzy the preceding moment of the fall. He says he felt out of bit until his son laid him down in his bed. He denies any loss of consciousness, mechanical misstep/slip, chest pain, palpitations, SOB, lethargy, fatigue, fever, chills, dysuria, hematuria, any other signs of bleeding such as hematochezia, melena.    Also endorses having a cough and sore throat    In ED, patient initial vitals were 103/55, 88 HR, 16 RR, 37.1 C, 96 O2 on room air 67 y/o M PMHx DDLT (07/2020, c/b post-op VRE bacteremia), prior ESBL e.coli prostate abscess, R heel OM (12/2020), C. 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 Patient fell on 12/19/21 at around 3:30 pm. Estimates that he was on the floor for roughly 2-3 minutes and was able to call out to son for assistance. He said before the fall he got out of the bed and started walking to his walker to use the restroom to urinate before falling backwards and landing on his pelvis. He denies any trauma to his head, chest, ribs and only endorses landing on his glutes. He says he normally feels lightheaded when he gets up too quickly so he will wait for a moment for lightheadedness to resolve before moving. He denies feeling lightheaded/dizzy the preceding moment of the fall. He said that he urinated on the floor but states that he had to go to the bathroom and this was not unintentional but denies loss of bowel control. He says he felt out of bit until his son laid him down in his bed. He denies any loss of consciousness, mechanical misstep/slip, chest pain, palpitations, SOB, lethargy, fatigue, fever, chills, dysuria, hematuria, any other signs of bleeding such as hematochezia, melena.    Also endorses having a cough and sore throat    In ED, patient initial vitals were 103/55, 88 HR, 16 RR, 37.1 C, 96 O2 on room air

## 2021-12-20 NOTE — PHYSICAL THERAPY INITIAL EVALUATION ADULT - PRECAUTIONS/LIMITATIONS, REHAB EVAL
He said before the fall he got out of the bed and started walking to his walker to use the restroom to urinate before falling backwards and landing on his pelvis. He denied any trauma to his head, chest, ribs and only endorses landing on his glutes. He said that he urinated on the floor but stated that he had to go to the bathroom and this was not unintentional but denied loss of bowel control. He said before the fall he got out of the bed and started walking to his walker to use the restroom to urinate before falling backwards and landing on his pelvis. He denied any trauma to his head, chest, ribs and only endorses landing on his glutes. He said that he urinated on the floor but stated that he had to go to the bathroom and this was not unintentional but denied loss of bowel control./fall precautions

## 2021-12-20 NOTE — PHYSICAL THERAPY INITIAL EVALUATION ADULT - PERTINENT HX OF CURRENT PROBLEM, REHAB EVAL
Pt is a 67 y/o M with PMH DDLT (07/2020, c/b post-op VRE bacteremia), prior ESBL e.coli prostate abscess, R heel OM (12/2020), C. Diff (12/2020), DM, MGUS, and HFpEF, decompensated JEAN cirrhosis status post liver transplant 2020 for JEAN presented s/p unwitnessed fall. Patient fell on 12/19/21 at ~3:30 pm, estimated that he was on the floor for roughly 2-3 minutes and was able to call out to son for assistance. CONT....

## 2021-12-20 NOTE — CONSULT NOTE ADULT - SUBJECTIVE AND OBJECTIVE BOX
Patient is a 66y old  Male who presents with a chief complaint of Fall (20 Dec 2021 13:04)    HPI:    65 y/o M PMHx DDLT (2020, c/b post-op VRE bacteremia), prior ESBL e.coli prostate abscess, R heel OM (2020), C. Diff (2020), DM, MGUS, and HFpEF, decompensated JEAN cirrhosis status post liver transplant  for JEAN presents status post unwitnessed fall. Patient fell on 21 at around 3:30 pm. Estimates that he was on the floor for roughly 2-3 minutes and was able to call out to son for assistance. He said before the fall he got out of the bed and started walking to his walker to use the restroom to urinate before falling backwards and landing on his pelvis. He denies any trauma to his head, chest, ribs and only endorses landing on his glutes. He says he normally feels lightheaded when he gets up too quickly so he will wait for a moment for lightheadedness to resolve before moving. He denies feeling lightheaded/dizzy the preceding moment of the fall. He said that he urinated on the floor but states that he had to go to the bathroom and this was not unintentional but denies loss of bowel control. He says he felt out of bit until his son laid him down in his bed. He denies any loss of consciousness, mechanical misstep/slip, chest pain, palpitations, SOB, lethargy, fatigue, fever, chills, dysuria, hematuria, any other signs of bleeding such as hematochezia, melena.    Also endorses having a cough and sore throat    In ED, patient initial vitals were 103/55, 88 HR, 16 RR, 37.1 C, 96 O2 on room air (20 Dec 2021 08:58)     prior hospital charts reviewed [  ]  primary team notes reviewed [  ]  other consultant notes reviewed [  ]    PAST MEDICAL & SURGICAL HISTORY:  Diabetes    Hypercholesteremia    Neuropathy    Hypertension    Renal stones  25 years ago    Fatty liver disease, nonalcoholic    Obesity    Hepatic encephalopathy    GERD with esophagitis  Gastritis &amp; Non Bleeding Ulcers    GIB (gastrointestinal bleeding)    S/P cholecystectomy        Allergies  codeine (Anaphylaxis)    ANTIMICROBIALS (past 90 days)  MEDICATIONS  (STANDING):    nystatin    Suspension   622207 Unit(s) Oral (21 @ 13:04)    trimethoprim   80 mG/sulfamethoxazole 400 mG   1 Tablet(s) Oral (21 @ 13:05)    valGANciclovir   450 milliGRAM(s) Oral (21 @ 13:04)      ANTIMICROBIALS:    nystatin    Suspension 683962 four times a day  remdesivir  IVPB    trimethoprim   80 mG/sulfamethoxazole 400 mG 1 daily  valGANciclovir 450 daily    OTHER MEDS: MEDICATIONS  (STANDING):  aspirin enteric coated 81 daily  atorvastatin 80 at bedtime  benzonatate 100 every 8 hours PRN  dextrose 40% Gel 15 once  dextrose 50% Injectable 25 once  dextrose 50% Injectable 12.5 once  dextrose 50% Injectable 25 once  glucagon  Injectable 1 once  heparin   Injectable 5000 every 8 hours  insulin glargine Injectable (LANTUS) 12 at bedtime  insulin lispro (ADMELOG) corrective regimen sliding scale  three times a day before meals  insulin lispro (ADMELOG) corrective regimen sliding scale  at bedtime  insulin lispro Injectable (ADMELOG) 7 three times a day before meals  predniSONE   Tablet 5 daily  sertraline 100 daily    SOCIAL HISTORY:   hx smoking  non-smoker    FAMILY HISTORY:  Family history of stomach cancer    Family history of hypertension    Family history of type 2 diabetes mellitus      REVIEW OF SYSTEMS  [  ] ROS unobtainable because:    [  ] All other systems negative except as noted below:	    Constitutional:  [ ] fever [ ] chills  [ ] weight loss  [ ] weakness  Skin:  [ ] rash [ ] phlebitis	  Eyes: [ ] icterus [ ] pain  [ ] discharge	  ENMT: [ ] sore throat  [ ] thrush [ ] ulcers [ ] exudates  Respiratory: [ ] dyspnea [ ] hemoptysis [ ] cough [ ] sputum	  Cardiovascular:  [ ] chest pain [ ] palpitations [ ] edema	  Gastrointestinal:  [ ] nausea [ ] vomiting [ ] diarrhea [ ] constipation [ ] pain	  Genitourinary:  [ ] dysuria [ ] frequency [ ] hematuria [ ] discharge [ ] flank pain  [ ] incontinence  Musculoskeletal:  [ ] myalgias [ ] arthralgias [ ] arthritis  [ ] back pain  Neurological:  [ ] headache [ ] seizures  [ ] confusion/altered mental status  Psychiatric:  [ ] anxiety [ ] depression	  Hematology/Lymphatics:  [ ] lymphadenopathy  Endocrine:  [ ] adrenal [ ] thyroid  Allergic/Immunologic:	 [ ] transplant [ ] seasonal    Vital Signs Last 24 Hrs  T(F): 99.5 (21 @ 15:30), Max: 101.6 (21 @ 13:52)  Vital Signs Last 24 Hrs  HR: 82 (21 @ 15:48) (82 - 92)  BP: 112/59 (21 @ 15:30) (103/66 - 166/70)  RR: 18 (21 @ 15:30)  SpO2: 96% (21 @ 15:48) (93% - 99%)  Wt(kg): --    PHYSICAL EXAMINATION:  General: Alert and Awake, NAD  HEENT: PERRL, EOMI, No subconjunctival hemorrhages, Oropharynx Clear, MMM  Neck: Supple, No CHELSEA  Cardiac: RRR, No M/R/G  Resp: CTAB, No Wh/Rh/Ra  Abdomen: NBS, NT/ND, No HSM, No rigidity or guarding  MSK: No LE edema. No stigmata of IE. No evidence of phlebitis. No evidence of synovitis.  : No bob  Skin: No rashes or lesions. Skin is warm and dry to the touch.   Neuro: Alert and Awake. CN 2-12 Grossly intact. Moves all four extremities spontaneously.  Psych: Calm, Pleasant, Cooperative                          7.3    3.63  )-----------( 141      ( 19 Dec 2021 22:41 )             22.9         x   |  x   |  x   ----------------------------<  x   x    |  x   |  1.95<H>    Ca    8.9      19 Dec 2021 22:41    TPro  6.4  /  Alb  3.3  /  TBili  0.3  /  DBili  0.1  /  AST  40  /  ALT  59<H>  /  AlkPhos  142<H>      Urinalysis Basic - ( 20 Dec 2021 12:42 )    Color: Yellow / Appearance: Slightly Turbid / S.016 / pH: x  Gluc: x / Ketone: Negative  / Bili: Negative / Urobili: Negative   Blood: x / Protein: 100 mg/dL / Nitrite: Negative   Leuk Esterase: Large / RBC: 6 /hpf /  /HPF   Sq Epi: x / Non Sq Epi: 1 /hpf / Bacteria: Negative    MICROBIOLOGY:          Rapid RVP Result: Detected ( @ 00:38)        RADIOLOGY:    <The imaging below has been reviewed and visualized by me independently. Findings as detailed in report below>     Patient is a 66y old  Male who presents with a chief complaint of Fall (20 Dec 2021 13:04)    HPI:    67 y/o M PMHx DDLT (2020, c/b post-op VRE bacteremia), prior ESBL e.coli prostate abscess, R heel OM (2020), C. Diff (2020), DM, MGUS, and HFpEF, decompensated JEAN cirrhosis status post liver transplant  for JEAN presents status post unwitnessed fall. Patient fell on 21 at around 3:30 pm. Estimates that he was on the floor for roughly 2-3 minutes and was able to call out to son for assistance. He said before the fall he got out of the bed and started walking to his walker to use the restroom to urinate before falling backwards and landing on his pelvis. He denies any trauma to his head, chest, ribs and only endorses landing on his glutes. He says he normally feels lightheaded when he gets up too quickly so he will wait for a moment for lightheadedness to resolve before moving. He denies feeling lightheaded/dizzy the preceding moment of the fall. He said that he urinated on the floor but states that he had to go to the bathroom and this was not unintentional but denies loss of bowel control. He says he felt out of bit until his son laid him down in his bed. He denies any loss of consciousness, mechanical misstep/slip, chest pain, palpitations, SOB, lethargy, fatigue, fever, chills, dysuria, hematuria, any other signs of bleeding such as hematochezia, melena.    Also endorses having a cough and sore throat    In ED, patient initial vitals were 103/55, 88 HR, 16 RR, 37.1 C, 96 O2 on room air (20 Dec 2021 08:58)     prior hospital charts reviewed [  ]  primary team notes reviewed [x  ]  other consultant notes reviewed [ x ]    PAST MEDICAL & SURGICAL HISTORY:  Diabetes    Hypercholesteremia    Neuropathy    Hypertension    Renal stones  25 years ago    Fatty liver disease, nonalcoholic    Obesity    Hepatic encephalopathy    GERD with esophagitis  Gastritis &amp; Non Bleeding Ulcers    GIB (gastrointestinal bleeding)    S/P cholecystectomy        Allergies  codeine (Anaphylaxis)    ANTIMICROBIALS (past 90 days)  MEDICATIONS  (STANDING):    nystatin    Suspension   [id number] Unit(s) Oral (21 @ 13:04)    trimethoprim   80 mG/sulfamethoxazole 400 mG   1 Tablet(s) Oral (21 @ 13:05)    valGANciclovir   450 milliGRAM(s) Oral (21 @ 13:04)      ANTIMICROBIALS:    nystatin    Suspension 120036 four times a day  remdesivir  IVPB    trimethoprim   80 mG/sulfamethoxazole 400 mG 1 daily  valGANciclovir 450 daily    OTHER MEDS: MEDICATIONS  (STANDING):  aspirin enteric coated 81 daily  atorvastatin 80 at bedtime  benzonatate 100 every 8 hours PRN  dextrose 40% Gel 15 once  dextrose 50% Injectable 25 once  dextrose 50% Injectable 12.5 once  dextrose 50% Injectable 25 once  glucagon  Injectable 1 once  heparin   Injectable 5000 every 8 hours  insulin glargine Injectable (LANTUS) 12 at bedtime  insulin lispro (ADMELOG) corrective regimen sliding scale  three times a day before meals  insulin lispro (ADMELOG) corrective regimen sliding scale  at bedtime  insulin lispro Injectable (ADMELOG) 7 three times a day before meals  predniSONE   Tablet 5 daily  sertraline 100 daily    SOCIAL HISTORY: no smoking or etoh use. no drug use.     FAMILY HISTORY:  Family history of stomach cancer    Family history of hypertension    Family history of type 2 diabetes mellitus      REVIEW OF SYSTEMS  [  ] ROS unobtainable because:    [ x ] All other systems negative except as noted below:	    Constitutional:  [x ] fever [ x] chills  [ ] weight loss  [ ] weakness  Skin:  [ ] rash [ ] phlebitis	  Eyes: [ ] icterus [ ] pain  [ ] discharge	  ENMT: [x ] sore throat  [ ] thrush [ ] ulcers [ ] exudates  Respiratory: [ ] dyspnea [ ] hemoptysis [x ] cough [ ] sputum	  Cardiovascular:  [ ] chest pain [ ] palpitations [ ] edema	  Gastrointestinal:  [ ] nausea [ ] vomiting [ ] diarrhea [ ] constipation [ ] pain	  Genitourinary:  [ ] dysuria [ ] frequency [ ] hematuria [ ] discharge [ ] flank pain  [ ] incontinence  Musculoskeletal:  [ ] myalgias [ ] arthralgias [ ] arthritis  [ ] back pain  Neurological:  [ ] headache [ ] seizures  [ ] confusion/altered mental status  Psychiatric:  [ ] anxiety [ ] depression	  Hematology/Lymphatics:  [ ] lymphadenopathy  Endocrine:  [ ] adrenal [ ] thyroid  Allergic/Immunologic:	 [ ] transplant [ ] seasonal    Vital Signs Last 24 Hrs  T(F): 99.5 (21 @ 15:30), Max: 101.6 (21 @ 13:52)  Vital Signs Last 24 Hrs  HR: 82 (21 @ 15:48) (82 - 92)  BP: 112/59 (21 @ 15:30) (103/66 - 166/70)  RR: 18 (21 @ 15:30)  SpO2: 96% (21 @ 15:48) (93% - 99%)  Wt(kg): --    PHYSICAL EXAMINATION:  General: Alert and Awake, NAD  HEENT: PERRL, EOMI, No subconjunctival hemorrhages, Oropharynx Clear, MMM  Neck: Supple, No CHELSEA  Cardiac: RRR, No M/R/G  Resp: Rales at lung bases, no wheezes or rhonchi.   Abdomen: NBS, NT/ND, No HSM, No rigidity or guarding  MSK: No LE edema. No stigmata of IE. No evidence of phlebitis. No evidence of synovitis.  : No bob  Skin: No rashes or lesions. Skin is warm and dry to the touch.   Neuro: Alert and Awake. CN 2-12 Grossly intact. Moves all four extremities spontaneously.  Psych: Calm, Pleasant, Cooperative                          7.3    3.63  )-----------( 141      ( 19 Dec 2021 22:41 )             22.9     12-20    x   |  x   |  x   ----------------------------<  x   x    |  x   |  1.95<H>    Ca    8.9      19 Dec 2021 22:41    TPro  6.4  /  Alb  3.3  /  TBili  0.3  /  DBili  0.1  /  AST  40  /  ALT  59<H>  /  AlkPhos  142<H>  12-20    Urinalysis Basic - ( 20 Dec 2021 12:42 )    Color: Yellow / Appearance: Slightly Turbid / S.016 / pH: x  Gluc: x / Ketone: Negative  / Bili: Negative / Urobili: Negative   Blood: x / Protein: 100 mg/dL / Nitrite: Negative   Leuk Esterase: Large / RBC: 6 /hpf /  /HPF   Sq Epi: x / Non Sq Epi: 1 /hpf / Bacteria: Negative    MICROBIOLOGY:    Rapid RVP Result: Detected ( @ 00:38)    RADIOLOGY:    <The imaging below has been reviewed and visualized by me independently. Findings as detailed in report below>    < from: CT Chest No Cont (21 @ 22:49) >  IMPRESSION:    No CT evidence of acute traumatic sequelae within the chest.    Patchy bilateral nodular and groundglass airspace opacities, most   prominent within the left upper and lower lobes, concerning for   multifocal pneumonia. Right apical irregular nodular opacity, new since   2020. Findings may represent additional focus of   infection/inflammation, however short-term follow-up after treatment, in   3 months is recommended.    < end of copied text >

## 2021-12-20 NOTE — CHART NOTE - NSCHARTNOTEFT_GEN_A_CORE
Spoke to son who was there initially for the fall. Said father was having falls for the past few weeks and believes it may be correlated with the "pneumonia" that was found about 1 week ago at the liver center. Says that dad has been having these episodes for the past month or so. Son describes episodes where dad will begin to stumble and they will catch him. He doesn't lose consciousness but he is "zoned out" and seems confused. Patient has urinated during these episodes but will verbalize that he has to urinate while in his confused state and urinate where he is sitting. Also patient seems to be having memory issues over the past year and seems to forget common things such as the existence of a family dog.

## 2021-12-20 NOTE — CONSULT NOTE ADULT - SUBJECTIVE AND OBJECTIVE BOX
MRN-[id number]  Patient is a 66y old  Male who presents with a chief complaint of   HPI:  Patient is a 67yo Rt handed M with pmh of DDLT (07/2020, c/b post-op VRE bacteremia), prior ESBL e.coli prostate abscess, R heel OM (12/2020), C. Diff (12/2020), DM, MGUS, and HFpEF, decompensated JEAN cirrhosis status post liver transplant 2020 for JEAN presents to [facility] sp  unwitnessed fall with + LOC which occurred about 7 hrs ago. .    PAST MEDICAL & SURGICAL HISTORY:  Diabetes    Hypercholesteremia    Neuropathy    Hypertension    Renal stones  25 years ago    Fatty liver disease, nonalcoholic    Obesity    Hepatic encephalopathy    GERD with esophagitis  Gastritis &amp; Non Bleeding Ulcers    GIB (gastrointestinal bleeding)    S/P cholecystectomy      FAMILY HISTORY:  Family history of stomach cancer    Family history of hypertension    Family history of type 2 diabetes mellitus      Social Hx:  Nonsmoker, no drug or alcohol use    Home Medications:  aspirin 81 mg oral delayed release tablet: 1 tab(s) orally once a day (02 Dec 2020 12:28)  everolimus 1 mg oral tablet: 3 tab(s) orally 2 times a day   (8AM and 8PM) (30 Dec 2020 09:58)  HumaLOG KwikPen 100 units/mL injectable solution: 10 unit(s) injectable 3 times a day (before meals) (02 Dec 2020 12:28)  insulin glargine: 18 unit(s) subcutaneous once a day (at bedtime) (30 Dec 2020 09:58)  magnesium oxide 400 mg (241.3 mg elemental magnesium) oral tablet: 1 tab(s) orally once a day (before a meal) (30 Dec 2020 09:58)  pantoprazole 40 mg oral delayed release tablet: 1 tab(s) orally once a day (before a meal) (02 Dec 2020 12:28)  predniSONE 5 mg oral tablet: 1 tab(s) orally once a day (02 Dec 2020 12:28)  tamsulosin 0.4 mg oral capsule: 1 cap(s) orally once a day (at bedtime) (02 Dec 2020 12:28)  valGANciclovir 450 mg oral tablet: 1 tab(s) orally every Monday, Wednesday, and Friday (30 Dec 2020 09:58)    MEDICATIONS  (STANDING):    MEDICATIONS  (PRN):    Allergies  codeine (Anaphylaxis)    Intolerances      REVIEW OF SYSTEMS  General:	  Skin/Breast:	  Ophthalmologic:  ENMT:	  Respiratory and Thorax:	  Cardiovascular:	  Gastrointestinal:	  Genitourinary:	  Musculoskeletal:	  Neurological:	  Psychiatric:	  Hematology/Lymphatics:	  Endocrine:	  Allergic/Immunologic:	    ROS: Pertinent positives in HPI, all other ROS were reviewed and are negative.      Vital Signs Last 24 Hrs  T(C): 37.1 (19 Dec 2021 18:38), Max: 37.1 (19 Dec 2021 18:38)  T(F): 98.7 (19 Dec 2021 18:38), Max: 98.7 (19 Dec 2021 18:38)  HR: 88 (19 Dec 2021 18:38) (88 - 88)  BP: 103/66 (19 Dec 2021 18:38) (103/66 - 103/66)  BP(mean): --  RR: 16 (19 Dec 2021 18:38) (16 - 16)  SpO2: 96% (19 Dec 2021 18:38) (96% - 96%)    GENERAL EXAM:  Constitutional: awake and alert. NAD  HEENT: PERRLA, EOMI  Neck: Supple  Respiratory: Breath sounds are clear bilaterally  Cardiovascular: S1 and S2, regular / irregular rhythm  Gastrointestinal: soft, nontender  Extremities: no edema, no cyanosis  Vascular: no carotid bruits  Musculoskeletal: no joint swelling/tenderness, no abnormal movements  Skin: no rashes    NEUROLOGICAL EXAM:  MS: AAOX3, fluent, attends b/l; recent and remote memory intact; normal attention, language and fund of knowledge.   CN: VFF, EOMI, PERRL, no JORDAN, no APD,  V1-3 intact, no facial asymmetry, t/p midline, SCM/trap intact.  Eyes-Fundi: no papilledema.  Motor: Strength: 5/5 4x. Tone: normal. Bulk: normal. DTR 2+ symm.  Plantar flex b/l. Sensation: intact to LT/PP/Vibration/Position/Temperature 4x.   Coordination: intact 4x.   Gait:  Romberg negative, pull test negative; walks with narrow base, pivots in 2 steps.    NIHSS  mRS    Labs:   cbc                      7.3    3.63  )-----------( 141      ( 19 Dec 2021 22:41 )             22.9     [id number]    134<L>  |  100  |  34<H>  ----------------------------<  229<H>  4.2   |  19<L>  |  1.93<H>    Ca    8.9      19 Dec 2021 22:41    TPro  6.6  /  Alb  3.4  /  TBili  0.3  /  DBili  x   /  AST  45<H>  /  ALT  66<H>  /  AlkPhos  152<H>  12-19    CardiacMarkersCARDIAC MARKERS ( 19 Dec 2021 22:41 )  x     / x     / 80 U/L / x     / x          LFTsLIVER FUNCTIONS - ( 19 Dec 2021 22:41 )  Alb: 3.4 g/dL / Pro: 6.6 g/dL / ALK PHOS: 152 U/L / ALT: 66 U/L / AST: 45 U/L / GGT: x           Radiology:  CT head w/o contrast: IMPRESSION:    No acute intracranial hemorrhage, mass effect or calvarial fracture.    Paranasal sinus mucosal thickening and opacification. Correlate for   sinusitis.   MRN-[id number]  Patient is a 66y old  Male who presents with a chief complaint of   HPI:  Patient is a 67yo Rt handed M with pmh of DDLT (07/2020, c/b post-op VRE bacteremia), prior ESBL e.coli prostate abscess, Rt heel OM (12/2020), C. Diff (12/2020), DM-II, MGUS, and HFpEF, decompensated JEAN cirrhosis (s/p liver transplant 2020) HTN presents to [facility] s/p  unwitnessed fall with + LOC which occurred this afternoon. Patient states he was lying in bed and needed to urinate. Patient sat up and as he was in the process of standing up to get his walker, he passed out. Patient states he remember calling his son during the event. Patient woke up to him lying in bed. Patient states he did not have any postictal confusion, nor tongue biting. Patient states there was urine on the floor; however,     PAST MEDICAL & SURGICAL HISTORY:  Diabetes    Hypercholesteremia    Neuropathy    Hypertension    Renal stones  25 years ago    Fatty liver disease, nonalcoholic    Obesity    Hepatic encephalopathy    GERD with esophagitis  Gastritis &amp; Non Bleeding Ulcers    GIB (gastrointestinal bleeding)    S/P cholecystectomy      FAMILY HISTORY:  Family history of stomach cancer    Family history of hypertension    Family history of type 2 diabetes mellitus      Social Hx:  Nonsmoker, no drug or alcohol use    Home Medications:  aspirin 81 mg oral delayed release tablet: 1 tab(s) orally once a day (02 Dec 2020 12:28)  everolimus 1 mg oral tablet: 3 tab(s) orally 2 times a day   (8AM and 8PM) (30 Dec 2020 09:58)  HumaLOG KwikPen 100 units/mL injectable solution: 10 unit(s) injectable 3 times a day (before meals) (02 Dec 2020 12:28)  insulin glargine: 18 unit(s) subcutaneous once a day (at bedtime) (30 Dec 2020 09:58)  magnesium oxide 400 mg (241.3 mg elemental magnesium) oral tablet: 1 tab(s) orally once a day (before a meal) (30 Dec 2020 09:58)  pantoprazole 40 mg oral delayed release tablet: 1 tab(s) orally once a day (before a meal) (02 Dec 2020 12:28)  predniSONE 5 mg oral tablet: 1 tab(s) orally once a day (02 Dec 2020 12:28)  tamsulosin 0.4 mg oral capsule: 1 cap(s) orally once a day (at bedtime) (02 Dec 2020 12:28)  valGANciclovir 450 mg oral tablet: 1 tab(s) orally every Monday, Wednesday, and Friday (30 Dec 2020 09:58)    MEDICATIONS  (STANDING):    MEDICATIONS  (PRN):    Allergies  codeine (Anaphylaxis)    Intolerances      REVIEW OF SYSTEMS  General:	  Skin/Breast:	  Ophthalmologic:  ENMT:	  Respiratory and Thorax:	  Cardiovascular:	  Gastrointestinal:	  Genitourinary:	  Musculoskeletal:	  Neurological:	  Psychiatric:	  Hematology/Lymphatics:	  Endocrine:	  Allergic/Immunologic:	    ROS: Pertinent positives in HPI, all other ROS were reviewed and are negative.      Vital Signs Last 24 Hrs  T(C): 37.1 (19 Dec 2021 18:38), Max: 37.1 (19 Dec 2021 18:38)  T(F): 98.7 (19 Dec 2021 18:38), Max: 98.7 (19 Dec 2021 18:38)  HR: 88 (19 Dec 2021 18:38) (88 - 88)  BP: 103/66 (19 Dec 2021 18:38) (103/66 - 103/66)  BP(mean): --  RR: 16 (19 Dec 2021 18:38) (16 - 16)  SpO2: 96% (19 Dec 2021 18:38) (96% - 96%)    GENERAL EXAM:  Constitutional: awake and alert. NAD  HEENT: PERRLA, EOMI  Neck: Supple  Respiratory: Breath sounds are clear bilaterally  Cardiovascular: S1 and S2, regular / irregular rhythm  Gastrointestinal: soft, nontender  Extremities: no edema, no cyanosis  Vascular: no carotid bruits  Musculoskeletal: no joint swelling/tenderness, no abnormal movements  Skin: no rashes    NEUROLOGICAL EXAM:  MS: AAOX3, fluent, attends b/l; recent and remote memory intact; normal attention, language and fund of knowledge.   CN: VFF, EOMI, PERRL, no JORDAN, no APD,  V1-3 intact, no facial asymmetry, t/p midline, SCM/trap intact.  Eyes-Fundi: no papilledema.  Motor: Strength: 5/5 4x. Tone: normal. Bulk: normal. DTR 2+ symm.  Plantar flex b/l. Sensation: intact to LT/PP/Vibration/Position/Temperature 4x.   Coordination: intact 4x.   Gait:  Romberg negative, pull test negative; walks with narrow base, pivots in 2 steps.    NIHSS  mRS    Labs:   cbc                      7.3    3.63  )-----------( 141      ( 19 Dec 2021 22:41 )             22.9     [id number]    134<L>  |  100  |  34<H>  ----------------------------<  229<H>  4.2   |  19<L>  |  1.93<H>    Ca    8.9      19 Dec 2021 22:41    TPro  6.6  /  Alb  3.4  /  TBili  0.3  /  DBili  x   /  AST  45<H>  /  ALT  66<H>  /  AlkPhos  152<H>  12-19    CardiacMarkersCARDIAC MARKERS ( 19 Dec 2021 22:41 )  x     / x     / 80 U/L / x     / x          LFTsLIVER FUNCTIONS - ( 19 Dec 2021 22:41 )  Alb: 3.4 g/dL / Pro: 6.6 g/dL / ALK PHOS: 152 U/L / ALT: 66 U/L / AST: 45 U/L / GGT: x           Radiology:  CT head w/o contrast: IMPRESSION:    No acute intracranial hemorrhage, mass effect or calvarial fracture.    Paranasal sinus mucosal thickening and opacification. Correlate for   sinusitis.   MRN-[id number]  Patient is a 66y old  Male who presents with a chief complaint of   HPI:  Patient is a 67yo Rt handed M with pmh of DDLT (07/2020, c/b post-op VRE bacteremia), prior ESBL e.coli prostate abscess, Rt heel OM (12/2020), C. Diff (12/2020), DM-II, MGUS, and HFpEF, decompensated JEAN cirrhosis (s/p liver transplant 2020) HTN presents to [facility] s/p  unwitnessed fall with + LOC which occurred this afternoon. Patient states he was lying in bed and needed to urinate. Patient sat up and as he was in the process of standing up to get his walker, he passed out. Patient states he remember calling his son during the event. Patient woke up to him lying in bed. Patient states he did not have any postictal confusion, nor tongue biting. Patient states there was urine on the floor; however, patient state he was already having the urge to urinate. Patient's son came and states the whole event lasted about 2-3 minutes. Patient states he has been feeling weak for the past 2 months. Denies headaches, numbness and tingling.     PAST MEDICAL & SURGICAL HISTORY:  Diabetes    Hypercholesteremia    Neuropathy    Hypertension    Renal stones  25 years ago    Fatty liver disease, nonalcoholic    Obesity    Hepatic encephalopathy    GERD with esophagitis  Gastritis &amp; Non Bleeding Ulcers    GIB (gastrointestinal bleeding)    S/P cholecystectomy      FAMILY HISTORY:  Family history of stomach cancer    Family history of hypertension    Family history of type 2 diabetes mellitus      Social Hx:  Nonsmoker, no drug or alcohol use    Home Medications:  aspirin 81 mg oral delayed release tablet: 1 tab(s) orally once a day (02 Dec 2020 12:28)  everolimus 1 mg oral tablet: 3 tab(s) orally 2 times a day   (8AM and 8PM) (30 Dec 2020 09:58)  HumaLOG KwikPen 100 units/mL injectable solution: 10 unit(s) injectable 3 times a day (before meals) (02 Dec 2020 12:28)  insulin glargine: 18 unit(s) subcutaneous once a day (at bedtime) (30 Dec 2020 09:58)  magnesium oxide 400 mg (241.3 mg elemental magnesium) oral tablet: 1 tab(s) orally once a day (before a meal) (30 Dec 2020 09:58)  pantoprazole 40 mg oral delayed release tablet: 1 tab(s) orally once a day (before a meal) (02 Dec 2020 12:28)  predniSONE 5 mg oral tablet: 1 tab(s) orally once a day (02 Dec 2020 12:28)  tamsulosin 0.4 mg oral capsule: 1 cap(s) orally once a day (at bedtime) (02 Dec 2020 12:28)  valGANciclovir 450 mg oral tablet: 1 tab(s) orally every Monday, Wednesday, and Friday (30 Dec 2020 09:58)    MEDICATIONS  (STANDING):    MEDICATIONS  (PRN):    Allergies  codeine (Anaphylaxis)    Intolerances      REVIEW OF SYSTEMS  General: Denies fever and chills. Feels weak 		  Ophthalmologic: Denies blurred vision   Respiratory and Thorax: Denies sob 	  Cardiovascular:	Denies chest pain   Gastrointestinal:	Denies N, V   Genitourinary:	Mentions urinary incontinence   Neurological: Denies headaches, numbness and tingling. Denies tongue bite       ROS: Pertinent positives in HPI, all other ROS were reviewed and are negative.      Vital Signs Last 24 Hrs  T(C): 37.1 (19 Dec 2021 18:38), Max: 37.1 (19 Dec 2021 18:38)  T(F): 98.7 (19 Dec 2021 18:38), Max: 98.7 (19 Dec 2021 18:38)  HR: 88 (19 Dec 2021 18:38) (88 - 88)  BP: 103/66 (19 Dec 2021 18:38) (103/66 - 103/66)  BP(mean): --  RR: 16 (19 Dec 2021 18:38) (16 - 16)  SpO2: 96% (19 Dec 2021 18:38) (96% - 96%)    GENERAL EXAM:  Constitutional: awake and alert. NAD  HEENT: PERRL, EOMI  Neck: Supple  Gastrointestinal: soft, nontender  Extremities: no edema, no cyanosis  Vascular: no carotid bruits  Musculoskeletal: no joint swelling/tenderness, no abnormal movements  Skin: no rashes    NEUROLOGICAL EXAM:  MS: AAOX3, fluent, follows commands.   CN: VFF, EOMI, PERRL,  V1-3 intact, no facial asymmetry, t/p midline, SCM/trap intact.  Motor: Strength: 5/5 4x.   Tone: normal. Bulk: normal.   DTR 2+ symm.    Plantar flex b/l.   Sensation: intact to LT/Temperature 4x.   Coordination: intact 4x.   Gait:  Deferred.      Labs:   cbc                      7.3    3.63  )-----------( 141      ( 19 Dec 2021 22:41 )             22.9     [id number]    134<L>  |  100  |  34<H>  ----------------------------<  229<H>  4.2   |  19<L>  |  1.93<H>    Ca    8.9      19 Dec 2021 22:41    TPro  6.6  /  Alb  3.4  /  TBili  0.3  /  DBili  x   /  AST  45<H>  /  ALT  66<H>  /  AlkPhos  152<H>  12-19    CardiacMarkersCARDIAC MARKERS ( 19 Dec 2021 22:41 )  x     / x     / 80 U/L / x     / x          LFTsLIVER FUNCTIONS - ( 19 Dec 2021 22:41 )  Alb: 3.4 g/dL / Pro: 6.6 g/dL / ALK PHOS: 152 U/L / ALT: 66 U/L / AST: 45 U/L / GGT: x           Radiology:  CT head w/o contrast: IMPRESSION:    No acute intracranial hemorrhage, mass effect or calvarial fracture.    Paranasal sinus mucosal thickening and opacification. Correlate for   sinusitis.

## 2021-12-20 NOTE — PHYSICAL THERAPY INITIAL EVALUATION ADULT - DISCHARGE DISPOSITION, PT EVAL
TBD pending assessment of ambulation Progressing towards home with assist & home PT sub-acute rehab/rehabilitation facility

## 2021-12-20 NOTE — CONSULT NOTE ADULT - ATTENDING COMMENTS
liver function good after transplant  +MISA with rise in Cr to 1.9. IV hydration, nephrology eval if continues to worsen  check everolimus level (sendout) liver function good after transplant  +CKD with stable Cr 1.9. IV hydration, nephrology eval if worsens  check everolimus level (sendout)  mgmt as per transplant ID

## 2021-12-20 NOTE — CONSULT NOTE ADULT - ATTENDING COMMENTS
metabolic syndrome, obesity, JEAN cirrhosis s/p liver transplant 7/2/20 w/ CKD on everolimus, MMF, pred 5 mg daily here with 1 month of cough found to have bilateral pulmonary infiltrates and COVID+. Not requiring supplemental oxygen, afebrile, and not short of breath.    Concern for everolimus causing non-infectious pneumonitis vs COVID.  Degree of symptoms seems to be out of proportion to degree of abnormality on CT  Recommend holding MMF and continuing EVR and pred 5 for now  Pulmonary consultation

## 2021-12-20 NOTE — CONSULT NOTE ADULT - SUBJECTIVE AND OBJECTIVE BOX
CHIEF COMPLAINT:    HPI per H and P: HPI:  65 y/o M PMHx DDLT (2020, c/b post-op VRE bacteremia), prior ESBL e.coli prostate abscess, R heel OM (2020), C. Diff (2020), DM, MGUS, and HFpEF, decompensated JEAN cirrhosis status post liver transplant  for JEAN presents status post unwitnessed fall. Patient fell on 21 at around 3:30 pm. Estimates that he was on the floor for roughly 2-3 minutes and was able to call out to son for assistance. He said before the fall he got out of the bed and started walking to his walker to use the restroom to urinate before falling backwards and landing on his pelvis. He denies any trauma to his head, chest, ribs and only endorses landing on his glutes. He says he normally feels lightheaded when he gets up too quickly so he will wait for a moment for lightheadedness to resolve before moving. He denies feeling lightheaded/dizzy the preceding moment of the fall. He said that he urinated on the floor but states that he had to go to the bathroom and this was not unintentional but denies loss of bowel control. He says he felt out of bit until his son laid him down in his bed. He denies any loss of consciousness, mechanical misstep/slip, chest pain, palpitations, SOB, lethargy, fatigue, fever, chills, dysuria, hematuria, any other signs of bleeding such as hematochezia, melena.    Also endorses having a cough and sore throat    In ED, patient initial vitals were 103/55, 88 HR, 16 RR, 37.1 C, 96 O2 on room air (20 Dec 2021 08:58)      PAST MEDICAL & SURGICAL HISTORY:  Diabetes    Hypercholesteremia    Neuropathy    Hypertension    Renal stones  25 years ago    Fatty liver disease, nonalcoholic    Obesity    Hepatic encephalopathy    GERD with esophagitis  Gastritis &amp; Non Bleeding Ulcers    GIB (gastrointestinal bleeding)    S/P cholecystectomy        FAMILY HISTORY:  Family history of stomach cancer    Family history of hypertension    Family history of type 2 diabetes mellitus        SOCIAL HISTORY:  Smoking: [ ] Never Smoked [ ] Former Smoker (__ packs x ___ years) [ ] Current Smoker  (__ packs x ___ years)  Substance Use: [ ] Never Used [ ] Used ____  EtOH Use:  Occupation:  Recent Travel:  Country of Birth:      Allergies    codeine (Anaphylaxis)    Intolerances        HOME MEDICATIONS:     ROS  Constitutional: denies fevers, chills, night sweats, weight loss  HEENT: denies visual changes, cough  Cardiovascular: denies palpitations, chest pain, edema  Respiratory: denies SOB, wheezing  Gastrointestinal: denies N/V/D, abdominal pain, hematochezia, melena  : denies dysuria, urinary urgency, increased frequency  MSK: denies muscle weakness, joint pain  Skin: denies new rashes or masses  Heme: denies bleeding, bruising  Neuro: denies headache, weakness    PHYSICAL EXAM:   GEN: Age appropriate, resting comfortably in bed, no acute distress, non toxic appearing, speaking in complete sentences.   HEENT: Conjunctiva and sclera normal  PULM: Lungs CTAB, no wheezes, rales, rhonchi  CV: RRR, S1S2, no MRG  MSK: no stiffness or joint effusions  Abdominal: Soft, nontender to palpation, non-distended, +BS  Extremities: No edema or cyanosis  NEURO: AAOx3  Psych: normal affect, normal behavior  Skin: No rashes, lesions      OBJECTIVE:  ICU Vital Signs Last 24 Hrs  T(C): 37.5 (20 Dec 2021 15:30), Max: 38.7 (20 Dec 2021 13:52)  T(F): 99.5 (20 Dec 2021 15:30), Max: 101.6 (20 Dec 2021 13:52)  HR: 82 (20 Dec 2021 15:48) (82 - 92)  BP: 112/59 (20 Dec 2021 15:30) (103/66 - 166/70)  BP(mean): --  ABP: --  ABP(mean): --  RR: 18 (20 Dec 2021 15:30) (16 - 22)  SpO2: 96% (20 Dec 2021 15:48) (93% - 99%)         @ 07:01  -   @ 17:05  --------------------------------------------------------  IN: 250 mL / OUT: 600 mL / NET: -350 mL      CAPILLARY BLOOD GLUCOSE      POCT Blood Glucose.: 163 mg/dL (20 Dec 2021 12:50)        HOSPITAL MEDICATIONS:  aspirin enteric coated 81 milliGRAM(s) Oral daily  heparin   Injectable 5000 Unit(s) SubCutaneous every 8 hours    nystatin    Suspension 808434 Unit(s) Oral four times a day  remdesivir  IVPB   IV Intermittent   remdesivir  IVPB 200 milliGRAM(s) IV Intermittent every 24 hours  trimethoprim   80 mG/sulfamethoxazole 400 mG 1 Tablet(s) Oral daily  valGANciclovir 450 milliGRAM(s) Oral daily      atorvastatin 80 milliGRAM(s) Oral at bedtime  dextrose 40% Gel 15 Gram(s) Oral once  dextrose 50% Injectable 25 Gram(s) IV Push once  dextrose 50% Injectable 12.5 Gram(s) IV Push once  dextrose 50% Injectable 25 Gram(s) IV Push once  glucagon  Injectable 1 milliGRAM(s) IntraMuscular once  insulin glargine Injectable (LANTUS) 12 Unit(s) SubCutaneous at bedtime  insulin lispro (ADMELOG) corrective regimen sliding scale   SubCutaneous three times a day before meals  insulin lispro (ADMELOG) corrective regimen sliding scale   SubCutaneous at bedtime  insulin lispro Injectable (ADMELOG) 7 Unit(s) SubCutaneous three times a day before meals  predniSONE   Tablet 5 milliGRAM(s) Oral daily    benzonatate 100 milliGRAM(s) Oral every 8 hours PRN    sertraline 100 milliGRAM(s) Oral daily          dextrose 5%. 1000 milliLiter(s) IV Continuous <Continuous>  dextrose 5%. 1000 milliLiter(s) IV Continuous <Continuous>            LABS:                        7.3    3.63  )-----------( 141      ( 19 Dec 2021 22:41 )             22.9     Hgb Trend: 7.3<--  12-20    x   |  x   |  x   ----------------------------<  x   x    |  x   |  1.95<H>    Ca    8.9      19 Dec 2021 22:41    TPro  6.4  /  Alb  3.3  /  TBili  0.3  /  DBili  0.1  /  AST  40  /  ALT  59<H>  /  AlkPhos  142<H>  12-20    Creatinine Trend: 1.95<--, 1.93<--  PT/INR - ( 20 Dec 2021 12:42 )   PT: 14.0 sec;   INR: 1.18 ratio           Urinalysis Basic - ( 20 Dec 2021 12:42 )    Color: Yellow / Appearance: Slightly Turbid / S.016 / pH: x  Gluc: x / Ketone: Negative  / Bili: Negative / Urobili: Negative   Blood: x / Protein: 100 mg/dL / Nitrite: Negative   Leuk Esterase: Large / RBC: 6 /hpf /  /HPF   Sq Epi: x / Non Sq Epi: 1 /hpf / Bacteria: Negative                   CHIEF COMPLAINT:    HPI per H and P: HPI:  67 y/o M PMHx DDLT (2020, c/b post-op VRE bacteremia), prior ESBL e.coli prostate abscess, R heel OM (2020), C. Diff (2020), DM, MGUS, and HFpEF, decompensated JEAN cirrhosis status post liver transplant  for JEAN presents status post unwitnessed fall. Patient fell on 21 at around 3:30 pm. Estimates that he was on the floor for roughly 2-3 minutes and was able to call out to son for assistance. He said before the fall he got out of the bed and started walking to his walker to use the restroom to urinate before falling backwards and landing on his pelvis. He denies any trauma to his head, chest, ribs and only endorses landing on his glutes. He says he normally feels lightheaded when he gets up too quickly so he will wait for a moment for lightheadedness to resolve before moving. He denies feeling lightheaded/dizzy the preceding moment of the fall. He said that he urinated on the floor but states that he had to go to the bathroom and this was not unintentional but denies loss of bowel control. He says he felt out of bit until his son laid him down in his bed. He denies any loss of consciousness, mechanical misstep/slip, chest pain, palpitations, SOB, lethargy, fatigue, fever, chills, dysuria, hematuria, any other signs of bleeding such as hematochezia, melena.    Also endorses having a cough and sore throat    In ED, patient initial vitals were 103/55, 88 HR, 16 RR, 37.1 C, 96 O2 on room air (20 Dec 2021 08:58)      PAST MEDICAL & SURGICAL HISTORY:  Diabetes    Hypercholesteremia    Neuropathy    Hypertension    Renal stones  25 years ago    Fatty liver disease, nonalcoholic    Obesity    Hepatic encephalopathy    GERD with esophagitis  Gastritis &amp; Non Bleeding Ulcers    GIB (gastrointestinal bleeding)    S/P cholecystectomy        FAMILY HISTORY:  Family history of stomach cancer    Family history of hypertension    Family history of type 2 diabetes mellitus        SOCIAL HISTORY:  Smoking: [ x] Never Smoked [ ] Former Smoker (__ packs x ___ years) [ ] Current Smoker  (__ packs x ___ years)  Substance Use: [ ] Never Used [ ] Used ____  EtOH Use:  Occupation:  Recent Travel:  Country of Birth:      Allergies    codeine (Anaphylaxis)    Intolerances        HOME MEDICATIONS:     ROS  Constitutional: denies fevers, chills, night sweats, weight loss  HEENT: denies visual changes, cough  Cardiovascular: denies palpitations, chest pain, edema  Respiratory: denies SOB, wheezing  Gastrointestinal: denies N/V/D, abdominal pain, hematochezia, melena  : denies dysuria, urinary urgency, increased frequency  MSK: denies muscle weakness, joint pain  Skin: denies new rashes or masses  Heme: denies bleeding, bruising  Neuro: denies headache, weakness    PHYSICAL EXAM:   GEN: Age appropriate, resting comfortably in bed, no acute distress, non toxic appearing, speaking in complete sentences.   HEENT: Conjunctiva and sclera normal  PULM: Lungs CTAB, no wheezes, rales, rhonchi  CV: RRR, S1S2, no MRG  MSK: no stiffness or joint effusions  Abdominal: Soft, nontender to palpation, non-distended, +BS  Extremities: No edema or cyanosis  NEURO: AAOx3  Psych: normal affect, normal behavior  Skin: No rashes, lesions      OBJECTIVE:  ICU Vital Signs Last 24 Hrs  T(C): 37.5 (20 Dec 2021 15:30), Max: 38.7 (20 Dec 2021 13:52)  T(F): 99.5 (20 Dec 2021 15:30), Max: 101.6 (20 Dec 2021 13:52)  HR: 82 (20 Dec 2021 15:48) (82 - 92)  BP: 112/59 (20 Dec 2021 15:30) (103/66 - 166/70)  BP(mean): --  ABP: --  ABP(mean): --  RR: 18 (20 Dec 2021 15:30) (16 - 22)  SpO2: 96% (20 Dec 2021 15:48) (93% - 99%)         @ 07:01  -   @ 17:05  --------------------------------------------------------  IN: 250 mL / OUT: 600 mL / NET: -350 mL      CAPILLARY BLOOD GLUCOSE      POCT Blood Glucose.: 163 mg/dL (20 Dec 2021 12:50)        HOSPITAL MEDICATIONS:  aspirin enteric coated 81 milliGRAM(s) Oral daily  heparin   Injectable 5000 Unit(s) SubCutaneous every 8 hours    nystatin    Suspension 396952 Unit(s) Oral four times a day  remdesivir  IVPB   IV Intermittent   remdesivir  IVPB 200 milliGRAM(s) IV Intermittent every 24 hours  trimethoprim   80 mG/sulfamethoxazole 400 mG 1 Tablet(s) Oral daily  valGANciclovir 450 milliGRAM(s) Oral daily      atorvastatin 80 milliGRAM(s) Oral at bedtime  dextrose 40% Gel 15 Gram(s) Oral once  dextrose 50% Injectable 25 Gram(s) IV Push once  dextrose 50% Injectable 12.5 Gram(s) IV Push once  dextrose 50% Injectable 25 Gram(s) IV Push once  glucagon  Injectable 1 milliGRAM(s) IntraMuscular once  insulin glargine Injectable (LANTUS) 12 Unit(s) SubCutaneous at bedtime  insulin lispro (ADMELOG) corrective regimen sliding scale   SubCutaneous three times a day before meals  insulin lispro (ADMELOG) corrective regimen sliding scale   SubCutaneous at bedtime  insulin lispro Injectable (ADMELOG) 7 Unit(s) SubCutaneous three times a day before meals  predniSONE   Tablet 5 milliGRAM(s) Oral daily    benzonatate 100 milliGRAM(s) Oral every 8 hours PRN    sertraline 100 milliGRAM(s) Oral daily          dextrose 5%. 1000 milliLiter(s) IV Continuous <Continuous>  dextrose 5%. 1000 milliLiter(s) IV Continuous <Continuous>            LABS:                        7.3    3.63  )-----------( 141      ( 19 Dec 2021 22:41 )             22.9     Hgb Trend: 7.3<--  12-20    x   |  x   |  x   ----------------------------<  x   x    |  x   |  1.95<H>    Ca    8.9      19 Dec 2021 22:41    TPro  6.4  /  Alb  3.3  /  TBili  0.3  /  DBili  0.1  /  AST  40  /  ALT  59<H>  /  AlkPhos  142<H>  12-20    Creatinine Trend: 1.95<--, 1.93<--  PT/INR - ( 20 Dec 2021 12:42 )   PT: 14.0 sec;   INR: 1.18 ratio           Urinalysis Basic - ( 20 Dec 2021 12:42 )    Color: Yellow / Appearance: Slightly Turbid / S.016 / pH: x  Gluc: x / Ketone: Negative  / Bili: Negative / Urobili: Negative   Blood: x / Protein: 100 mg/dL / Nitrite: Negative   Leuk Esterase: Large / RBC: 6 /hpf /  /HPF   Sq Epi: x / Non Sq Epi: 1 /hpf / Bacteria: Negative

## 2021-12-20 NOTE — H&P ADULT - ATTENDING COMMENTS
66M with PMHx of T2DM and liver transplant presenting after a fall. Inconsistent story as ED told me patient syncopized, but patient told my team he fell. Unsure if it was mechanical. He denies loss of consciousness. He was on the floor for 2-3 minutes before his son moved him. He states he urinated while on the floor because of urge incontinence. He has been having a non-productive cough for one month. He has been having negative COVID-19 tests weekly since symptoms started    Labs personally reviewed   CTA Chest personally reviewed by me: faint GGO in YASMINE and LLL    VSS  Afebrile  Not hypoxic    Neurology & transplant surgery consult appreciated    A/P:  Most important thing to do is obtain collateral from son. If patient only fell does not need much of a work up as an inpatient. It could be purely from infection such as COVID such as weakness or poor PO intake. For now we can start with orthostatics and TTE. Transplant ID called by me, I will do any infectious work up they want & if they want COVID directed therapies. Hepatology aware. Transplant surgery recommending holding CellCept and Everolimus. Currently only on Prednisone. He is on his prophylactic meds. DVT PPx. Reduce pre-meal insulin slightly, continue with basal.

## 2021-12-21 ENCOUNTER — APPOINTMENT (OUTPATIENT)
Dept: HEPATOLOGY | Facility: CLINIC | Age: 66
End: 2021-12-21

## 2021-12-21 DIAGNOSIS — D64.9 ANEMIA, UNSPECIFIED: ICD-10-CM

## 2021-12-21 LAB
A1C WITH ESTIMATED AVERAGE GLUCOSE RESULT: 10.1 % — HIGH (ref 4–5.6)
ALBUMIN SERPL ELPH-MCNC: 2.8 G/DL — LOW (ref 3.3–5)
ALP SERPL-CCNC: 151 U/L — HIGH (ref 40–120)
ALT FLD-CCNC: 66 U/L — HIGH (ref 10–45)
ANION GAP SERPL CALC-SCNC: 15 MMOL/L — SIGNIFICANT CHANGE UP (ref 5–17)
AST SERPL-CCNC: 70 U/L — HIGH (ref 10–40)
BILIRUB DIRECT SERPL-MCNC: 0.1 MG/DL — SIGNIFICANT CHANGE UP (ref 0–0.3)
BILIRUB INDIRECT FLD-MCNC: 0.3 MG/DL — SIGNIFICANT CHANGE UP (ref 0.2–1)
BILIRUB SERPL-MCNC: 0.4 MG/DL — SIGNIFICANT CHANGE UP (ref 0.2–1.2)
BLD GP AB SCN SERPL QL: NEGATIVE — SIGNIFICANT CHANGE UP
BUN SERPL-MCNC: 32 MG/DL — HIGH (ref 7–23)
CALCIUM SERPL-MCNC: 8.2 MG/DL — LOW (ref 8.4–10.5)
CHLORIDE SERPL-SCNC: 100 MMOL/L — SIGNIFICANT CHANGE UP (ref 96–108)
CO2 SERPL-SCNC: 17 MMOL/L — LOW (ref 22–31)
CREAT SERPL-MCNC: 1.96 MG/DL — HIGH (ref 0.5–1.3)
CREAT SERPL-MCNC: 1.99 MG/DL — HIGH (ref 0.5–1.3)
CRP SERPL-MCNC: 141 MG/L — HIGH (ref 0–4)
CRYPTOC AG FLD QL: NEGATIVE — SIGNIFICANT CHANGE UP
CULTURE RESULTS: SIGNIFICANT CHANGE UP
D DIMER BLD IA.RAPID-MCNC: 598 NG/ML DDU — HIGH
ESTIMATED AVERAGE GLUCOSE: 243 MG/DL — HIGH (ref 68–114)
GLUCOSE BLDC GLUCOMTR-MCNC: 148 MG/DL — HIGH (ref 70–99)
GLUCOSE BLDC GLUCOMTR-MCNC: 165 MG/DL — HIGH (ref 70–99)
GLUCOSE BLDC GLUCOMTR-MCNC: 190 MG/DL — HIGH (ref 70–99)
GLUCOSE BLDC GLUCOMTR-MCNC: 216 MG/DL — HIGH (ref 70–99)
GLUCOSE SERPL-MCNC: 179 MG/DL — HIGH (ref 70–99)
HCT VFR BLD CALC: 21.3 % — LOW (ref 39–50)
HCT VFR BLD CALC: 24 % — LOW (ref 39–50)
HGB BLD-MCNC: 6.7 G/DL — CRITICAL LOW (ref 13–17)
HGB BLD-MCNC: 7.7 G/DL — LOW (ref 13–17)
INR BLD: 1.28 RATIO — HIGH (ref 0.88–1.16)
MAGNESIUM SERPL-MCNC: 1.5 MG/DL — LOW (ref 1.6–2.6)
MCHC RBC-ENTMCNC: 26.5 PG — LOW (ref 27–34)
MCHC RBC-ENTMCNC: 26.6 PG — LOW (ref 27–34)
MCHC RBC-ENTMCNC: 31.5 GM/DL — LOW (ref 32–36)
MCHC RBC-ENTMCNC: 32.1 GM/DL — SIGNIFICANT CHANGE UP (ref 32–36)
MCV RBC AUTO: 83 FL — SIGNIFICANT CHANGE UP (ref 80–100)
MCV RBC AUTO: 84.2 FL — SIGNIFICANT CHANGE UP (ref 80–100)
MRSA PCR RESULT.: SIGNIFICANT CHANGE UP
NRBC # BLD: 0 /100 WBCS — SIGNIFICANT CHANGE UP (ref 0–0)
NRBC # BLD: 0 /100 WBCS — SIGNIFICANT CHANGE UP (ref 0–0)
OB PNL STL: POSITIVE
PHOSPHATE SERPL-MCNC: 1.7 MG/DL — LOW (ref 2.5–4.5)
PLATELET # BLD AUTO: 128 K/UL — LOW (ref 150–400)
PLATELET # BLD AUTO: 141 K/UL — LOW (ref 150–400)
POTASSIUM SERPL-MCNC: 3.7 MMOL/L — SIGNIFICANT CHANGE UP (ref 3.5–5.3)
POTASSIUM SERPL-SCNC: 3.7 MMOL/L — SIGNIFICANT CHANGE UP (ref 3.5–5.3)
PROT SERPL-MCNC: 6 G/DL — SIGNIFICANT CHANGE UP (ref 6–8.3)
PROTHROM AB SERPL-ACNC: 15.2 SEC — HIGH (ref 10.6–13.6)
RBC # BLD: 2.53 M/UL — LOW (ref 4.2–5.8)
RBC # BLD: 2.89 M/UL — LOW (ref 4.2–5.8)
RBC # FLD: 16.4 % — HIGH (ref 10.3–14.5)
RBC # FLD: 16.6 % — HIGH (ref 10.3–14.5)
RH IG SCN BLD-IMP: POSITIVE — SIGNIFICANT CHANGE UP
S AUREUS DNA NOSE QL NAA+PROBE: DETECTED
SODIUM SERPL-SCNC: 132 MMOL/L — LOW (ref 135–145)
SPECIMEN SOURCE: SIGNIFICANT CHANGE UP
WBC # BLD: 4.31 K/UL — SIGNIFICANT CHANGE UP (ref 3.8–10.5)
WBC # BLD: 4.62 K/UL — SIGNIFICANT CHANGE UP (ref 3.8–10.5)
WBC # FLD AUTO: 4.31 K/UL — SIGNIFICANT CHANGE UP (ref 3.8–10.5)
WBC # FLD AUTO: 4.62 K/UL — SIGNIFICANT CHANGE UP (ref 3.8–10.5)

## 2021-12-21 PROCEDURE — 99233 SBSQ HOSP IP/OBS HIGH 50: CPT | Mod: GC

## 2021-12-21 PROCEDURE — 99232 SBSQ HOSP IP/OBS MODERATE 35: CPT

## 2021-12-21 PROCEDURE — 99232 SBSQ HOSP IP/OBS MODERATE 35: CPT | Mod: GC

## 2021-12-21 PROCEDURE — 93306 TTE W/DOPPLER COMPLETE: CPT | Mod: 26

## 2021-12-21 RX ORDER — EVEROLIMUS 10 MG/1
4 TABLET ORAL
Refills: 0 | Status: DISCONTINUED | OUTPATIENT
Start: 2021-12-21 | End: 2021-12-25

## 2021-12-21 RX ORDER — PANTOPRAZOLE SODIUM 20 MG/1
40 TABLET, DELAYED RELEASE ORAL DAILY
Refills: 0 | Status: DISCONTINUED | OUTPATIENT
Start: 2021-12-21 | End: 2021-12-21

## 2021-12-21 RX ORDER — PANTOPRAZOLE SODIUM 20 MG/1
40 TABLET, DELAYED RELEASE ORAL
Refills: 0 | Status: DISCONTINUED | OUTPATIENT
Start: 2021-12-21 | End: 2021-12-22

## 2021-12-21 RX ORDER — SODIUM,POTASSIUM PHOSPHATES 278-250MG
2 POWDER IN PACKET (EA) ORAL ONCE
Refills: 0 | Status: COMPLETED | OUTPATIENT
Start: 2021-12-21 | End: 2021-12-21

## 2021-12-21 RX ORDER — ACETAMINOPHEN 500 MG
975 TABLET ORAL ONCE
Refills: 0 | Status: COMPLETED | OUTPATIENT
Start: 2021-12-21 | End: 2021-12-21

## 2021-12-21 RX ORDER — MAGNESIUM SULFATE 500 MG/ML
1 VIAL (ML) INJECTION ONCE
Refills: 0 | Status: COMPLETED | OUTPATIENT
Start: 2021-12-21 | End: 2021-12-21

## 2021-12-21 RX ORDER — PANTOPRAZOLE SODIUM 20 MG/1
40 TABLET, DELAYED RELEASE ORAL ONCE
Refills: 0 | Status: COMPLETED | OUTPATIENT
Start: 2021-12-21 | End: 2021-12-21

## 2021-12-21 RX ADMIN — VALGANCICLOVIR 450 MILLIGRAM(S): 450 TABLET, FILM COATED ORAL at 11:29

## 2021-12-21 RX ADMIN — CEFEPIME 100 MILLIGRAM(S): 1 INJECTION, POWDER, FOR SOLUTION INTRAMUSCULAR; INTRAVENOUS at 18:37

## 2021-12-21 RX ADMIN — Medication 5 MILLIGRAM(S): at 05:27

## 2021-12-21 RX ADMIN — Medication 1: at 17:51

## 2021-12-21 RX ADMIN — EVEROLIMUS 4 MILLIGRAM(S): 10 TABLET ORAL at 18:37

## 2021-12-21 RX ADMIN — ATORVASTATIN CALCIUM 80 MILLIGRAM(S): 80 TABLET, FILM COATED ORAL at 21:51

## 2021-12-21 RX ADMIN — Medication 2: at 09:26

## 2021-12-21 RX ADMIN — Medication 100 GRAM(S): at 09:25

## 2021-12-21 RX ADMIN — Medication 2 PACKET(S): at 13:05

## 2021-12-21 RX ADMIN — PANTOPRAZOLE SODIUM 40 MILLIGRAM(S): 20 TABLET, DELAYED RELEASE ORAL at 17:41

## 2021-12-21 RX ADMIN — HEPARIN SODIUM 5000 UNIT(S): 5000 INJECTION INTRAVENOUS; SUBCUTANEOUS at 13:05

## 2021-12-21 RX ADMIN — Medication 975 MILLIGRAM(S): at 11:28

## 2021-12-21 RX ADMIN — SERTRALINE 100 MILLIGRAM(S): 25 TABLET, FILM COATED ORAL at 11:29

## 2021-12-21 RX ADMIN — HEPARIN SODIUM 5000 UNIT(S): 5000 INJECTION INTRAVENOUS; SUBCUTANEOUS at 05:28

## 2021-12-21 RX ADMIN — Medication 1: at 13:03

## 2021-12-21 RX ADMIN — REMDESIVIR 500 MILLIGRAM(S): 5 INJECTION INTRAVENOUS at 17:42

## 2021-12-21 RX ADMIN — CEFEPIME 100 MILLIGRAM(S): 1 INJECTION, POWDER, FOR SOLUTION INTRAMUSCULAR; INTRAVENOUS at 05:32

## 2021-12-21 RX ADMIN — Medication 975 MILLIGRAM(S): at 11:55

## 2021-12-21 RX ADMIN — Medication 500000 UNIT(S): at 17:41

## 2021-12-21 RX ADMIN — Medication 81 MILLIGRAM(S): at 11:28

## 2021-12-21 RX ADMIN — PANTOPRAZOLE SODIUM 40 MILLIGRAM(S): 20 TABLET, DELAYED RELEASE ORAL at 21:51

## 2021-12-21 RX ADMIN — Medication 500000 UNIT(S): at 11:28

## 2021-12-21 RX ADMIN — Medication 500000 UNIT(S): at 05:28

## 2021-12-21 RX ADMIN — Medication 1 TABLET(S): at 11:31

## 2021-12-21 NOTE — PROGRESS NOTE ADULT - SUBJECTIVE AND OBJECTIVE BOX
Internal Medicine   Jose Alaniz | PGY-1     OVERNIGHT EVENTS:      SUBJECTIVE:       MEDICATIONS  (STANDING):  aspirin enteric coated 81 milliGRAM(s) Oral daily  atorvastatin 80 milliGRAM(s) Oral at bedtime  cefepime   IVPB      cefepime   IVPB 1000 milliGRAM(s) IV Intermittent every 12 hours  dextrose 40% Gel 15 Gram(s) Oral once  dextrose 5%. 1000 milliLiter(s) (50 mL/Hr) IV Continuous <Continuous>  dextrose 5%. 1000 milliLiter(s) (100 mL/Hr) IV Continuous <Continuous>  dextrose 50% Injectable 25 Gram(s) IV Push once  dextrose 50% Injectable 12.5 Gram(s) IV Push once  dextrose 50% Injectable 25 Gram(s) IV Push once  glucagon  Injectable 1 milliGRAM(s) IntraMuscular once  heparin   Injectable 5000 Unit(s) SubCutaneous every 8 hours  insulin lispro (ADMELOG) corrective regimen sliding scale   SubCutaneous three times a day before meals  insulin lispro (ADMELOG) corrective regimen sliding scale   SubCutaneous at bedtime  nystatin    Suspension 352831 Unit(s) Oral four times a day  predniSONE   Tablet 5 milliGRAM(s) Oral daily  remdesivir  IVPB   IV Intermittent   remdesivir  IVPB 100 milliGRAM(s) IV Intermittent every 24 hours  sertraline 100 milliGRAM(s) Oral daily  trimethoprim   80 mG/sulfamethoxazole 400 mG 1 Tablet(s) Oral daily  valGANciclovir 450 milliGRAM(s) Oral daily    MEDICATIONS  (PRN):  benzonatate 100 milliGRAM(s) Oral every 8 hours PRN Cough        T(F): 98.9 (12-21-21 @ 04:41), Max: 101.6 (12-20-21 @ 13:52)  HR: 97 (12-21-21 @ 05:49) (66 - 98)  BP: 131/65 (12-21-21 @ 04:41) (110/55 - 146/68)  BP(mean): --  RR: 18 (12-21-21 @ 04:41) (18 - 21)  SpO2: 92% (12-21-21 @ 05:49) (91% - 96%)    PHYSICAL EXAM:     GENERAL: NAD, lying in bed comfortably  HEAD:  Atraumatic, Normocephalic  EYES: EOMI, PERRLA, conjunctiva and sclera clear, no nystagmus noted  ENT: Moist mucous membranes,   NECK: Supple, No JVD, trachea midline  CHEST/LUNG: Clear to auscultation bilaterally; No rales, rhonchi, wheezing, or rubs. Unlabored respirations  HEART: Regular rate and rhythm; No murmurs, rubs, or gallops, normal S1/S2  ABDOMEN: normal bowel sounds; Soft, nontender, nondistended, no organomegaly   EXTREMITIES:  2+ Peripheral Pulses, brisk capillary refill. No clubbing, cyanosis, or edema  MSK: No gross deformities noted   Neurological:  A&Ox3, no focal deficits   SKIN: No rashes or lesions  PSYCH: Normal mood, affect     TELEMETRY:    LABS:                        6.7    4.62  )-----------( 141      ( 21 Dec 2021 06:49 )             21.3     12-20    x   |  x   |  x   ----------------------------<  x   x    |  x   |  1.95<H>    Ca    8.9      19 Dec 2021 22:41    TPro  6.4  /  Alb  3.3  /  TBili  0.3  /  DBili  0.1  /  AST  40  /  ALT  59<H>  /  AlkPhos  142<H>  12-20    CARDIAC MARKERS ( 19 Dec 2021 22:41 )  x     / x     / 80 U/L / x     / x          PT/INR - ( 20 Dec 2021 12:42 )   PT: 14.0 sec;   INR: 1.18 ratio             Creatinine Trend: 1.95<--, 1.93<--  I&O's Summary    20 Dec 2021 07:01  -  21 Dec 2021 07:00  --------------------------------------------------------  IN: 1550 mL / OUT: 1300 mL / NET: 250 mL      BNP    RADIOLOGY & ADDITIONAL STUDIES:             Internal Medicine   [facility] Alan[facility] | PGY-1     OVERNIGHT EVENTS: No overnight events. Patient refusing CPAP for SHENG      SUBJECTIVE: Patient was seen and examined at bedside this morning. Denies any nausea/vomiting/diarrhea, headache, shortness of breath, abdominal pain or chest pain/palpitations. Patient responding appropriately to questions and able to make needs known. Vital signs/imaging/telemetry events reviewed.       MEDICATIONS  (STANDING):  aspirin enteric coated 81 milliGRAM(s) Oral daily  atorvastatin 80 milliGRAM(s) Oral at bedtime  cefepime   IVPB      cefepime   IVPB 1000 milliGRAM(s) IV Intermittent every 12 hours  dextrose 40% Gel 15 Gram(s) Oral once  dextrose 5%. 1000 milliLiter(s) (50 mL/Hr) IV Continuous <Continuous>  dextrose 5%. 1000 milliLiter(s) (100 mL/Hr) IV Continuous <Continuous>  dextrose 50% Injectable 25 Gram(s) IV Push once  dextrose 50% Injectable 12.5 Gram(s) IV Push once  dextrose 50% Injectable 25 Gram(s) IV Push once  glucagon  Injectable 1 milliGRAM(s) IntraMuscular once  heparin   Injectable 5000 Unit(s) SubCutaneous every 8 hours  insulin lispro (ADMELOG) corrective regimen sliding scale   SubCutaneous three times a day before meals  insulin lispro (ADMELOG) corrective regimen sliding scale   SubCutaneous at bedtime  nystatin    Suspension 561271 Unit(s) Oral four times a day  predniSONE   Tablet 5 milliGRAM(s) Oral daily  remdesivir  IVPB   IV Intermittent   remdesivir  IVPB 100 milliGRAM(s) IV Intermittent every 24 hours  sertraline 100 milliGRAM(s) Oral daily  trimethoprim   80 mG/sulfamethoxazole 400 mG 1 Tablet(s) Oral daily  valGANciclovir 450 milliGRAM(s) Oral daily    MEDICATIONS  (PRN):  benzonatate 100 milliGRAM(s) Oral every 8 hours PRN Cough        T(F): 98.9 (12-21-21 @ 04:41), Max: 101.6 (12-20-21 @ 13:52)  HR: 97 (12-21-21 @ 05:49) (66 - 98)  BP: 131/65 (12-21-21 @ 04:41) (110/55 - 146/68)  BP(mean): --  RR: 18 (12-21-21 @ 04:41) (18 - 21)  SpO2: 92% (12-21-21 @ 05:49) (91% - 96%)    PHYSICAL EXAM:     GENERAL: NAD, lying in bed comfortably on room air, coughing  HEAD:  Atraumatic, Normocephalic  EYES: EOMI, PERRLA, conjunctiva and sclera clear  ENT: Moist mucous membranes  NECK: Supple, No JVD  CHEST/LUNG: Clear to auscultation bilaterally; No rales, rhonchi, wheezing, or rubs. Unlabored respirations  HEART: Regular rate and rhythm; No murmurs, rubs, or gallops  ABDOMEN: normal bowel sounds; Soft, nontender, nondistended  EXTREMITIES:  2+ Peripheral Pulses, brisk capillary refill. No clubbing, cyanosis, or edema  Neurological:  A&Ox3, no focal deficits   SKIN: No rashes or lesions  PSYCH: normal affect and mood    TELEMETRY:    LABS:                        6.7    4.62  )-----------( 141      ( 21 Dec 2021 06:49 )             21.3     12-20    x   |  x   |  x   ----------------------------<  x   x    |  x   |  1.95<H>    Ca    8.9      19 Dec 2021 22:41    TPro  6.4  /  Alb  3.3  /  TBili  0.3  /  DBili  0.1  /  AST  40  /  ALT  59<H>  /  AlkPhos  142<H>  12-20    CARDIAC MARKERS ( 19 Dec 2021 22:41 )  x     / x     / 80 U/L / x     / x          PT/INR - ( 20 Dec 2021 12:42 )   PT: 14.0 sec;   INR: 1.18 ratio             Creatinine Trend: 1.95<--, 1.93<--  I&O's Summary    20 Dec 2021 07:01  -  21 Dec 2021 07:00  --------------------------------------------------------  IN: 1550 mL / OUT: 1300 mL / NET: 250 mL      BNP    RADIOLOGY & ADDITIONAL STUDIES:

## 2021-12-21 NOTE — PROGRESS NOTE ADULT - PROBLEM SELECTOR PLAN 1
Unclear etiology of the fall. Patient denies loss of consciousness. Low suspicion of a cardiogenic cause with unremarkable trops and EKG. Denies mechanical causes. Patient said that he was "out of it" when he fell but did not lose consciousness. He also said that he urinated on the floor but claims this was intentional. Neurologic cause?   - Neurology following  - Orthostatic blood pressures were positive for orthostatic hypotension  - EEG  - TTE  - Telemetry  - Infectious work up: Sputum culture, urine culture, blood cultures  - Pt consult Unclear etiology of the fall. Patient denies loss of consciousness. Low suspicion of a cardiogenic cause with unremarkable trops and EKG. Denies mechanical causes. Patient said that he was "out of it" when he fell but did not lose consciousness. He also said that he urinated on the floor but claims this was intentional. Neurologic cause?   - Neurology following  - Orthostatic blood pressures were positive for orthostatic hypotension  - TTE  - Telemetry  - Infectious work up: Sputum culture, urine culture, blood cultures  - Pt consult

## 2021-12-21 NOTE — PROGRESS NOTE ADULT - PROBLEM SELECTOR PLAN 5
Mildly elevated admission AST/ALT at 45/66 respectively. Recent ultrasound showed good graft flow.   - Transplant hepatology consult H/H slowly downtrending. Per nursing, patient had "dark brown" stools. Unfortunately sample was not saved so could not assess. Previous endoscopy was done roughly 1 year ago for melena and was shown to have varices and bleeding  - Active type and screen  - trend CBC  - pRBC PRN when hgb less than 7  - protonix 40 q24 for now until can see stool sample and confirm possible melena H/H slowly downtrending. Per nursing, patient had "dark brown" stools. Unfortunately sample was not saved so could not assess. Previous endoscopy was done roughly 1 year ago for melena and was shown to have varices and bleeding  - Blood consent in chart  - Active type and screen  - trend CBC  - pRBC PRN when hgb less than 7  - protonix 40 q24 for now until can see stool sample and confirm possible melena H/H slowly downtrending. Per nursing, patient had "dark brown" stools. Unfortunately sample was not saved so could not assess. Previous endoscopy was done roughly 1 year ago for melena and was shown to have varices and bleeding  - Blood consent in chart  - Active type and screen  - trend CBC  - pRBC PRN when hgb less than 7  - Gi prophlyaxis: protonix 40 q12   - Will see stool sample to confirm possible melena

## 2021-12-21 NOTE — PROGRESS NOTE ADULT - PROBLEM SELECTOR PLAN 2
Cough and sore throat for the past few days. RVP positive for covid at this visit  - Currently in PICU (Covid unit)  - satting well on room air  - cough drops PRN  - CTM Cough and sore throat for the past few days. RVP positive for covid at this visit  - Currently in PICU (Covid unit)  - satting well on room air  - cough drops PRN  - on remdesivir (12/20 - )  - s/p monoclonal antibody infusion (12/20)  - CT

## 2021-12-21 NOTE — PROGRESS NOTE ADULT - PROBLEM SELECTOR PLAN 4
CT chest concerning for possible beginnings of multifocal pneumonia. May be the beginnings of covid pneumonia vs everolimus side effect  - See above (coronavirus)  - Pulm consult: are lung findings secondary to covid or everolimus side effect?  - Will CTM for now CT chest concerning for possible beginnings of multifocal pneumonia. May be the beginnings of covid pneumonia vs everolimus side effect  - See above (coronavirus)  - Pulm consult: are lung findings secondary to covid or everolimus side effect? Unable to be determined by imaging  - Will CTM for now

## 2021-12-21 NOTE — PROGRESS NOTE ADULT - PROBLEM SELECTOR PLAN 7
On home APAP per sleep note on [employer]  - Will allow patient to use CPAP given he is desatting when sleeping per nurse. - Transplant hepatology consulted  - Transplant surgery consulted  - Transplant ID consulted  - prednisone 5  - Unclear if everolimus should be continued   - cellcept 500 q12 held per recs  - bactrim, valcyte for prophylaxis

## 2021-12-21 NOTE — PROGRESS NOTE ADULT - SUBJECTIVE AND OBJECTIVE BOX
Gastroenterology/Hepatology Progress Note      Interval Events:   - no acute events yesterday  - seen by pulm, ID; on cefepime (UTI), started on remdesivir  - no new complaints this morning    Allergies:  codeine (Anaphylaxis)      Hospital Medications:  aspirin enteric coated 81 milliGRAM(s) Oral daily  atorvastatin 80 milliGRAM(s) Oral at bedtime  benzonatate 100 milliGRAM(s) Oral every 8 hours PRN  cefepime   IVPB 1000 milliGRAM(s) IV Intermittent every 12 hours  cefepime   IVPB      dextrose 40% Gel 15 Gram(s) Oral once  dextrose 5%. 1000 milliLiter(s) IV Continuous <Continuous>  dextrose 5%. 1000 milliLiter(s) IV Continuous <Continuous>  dextrose 50% Injectable 25 Gram(s) IV Push once  dextrose 50% Injectable 12.5 Gram(s) IV Push once  dextrose 50% Injectable 25 Gram(s) IV Push once  glucagon  Injectable 1 milliGRAM(s) IntraMuscular once  heparin   Injectable 5000 Unit(s) SubCutaneous every 8 hours  insulin lispro (ADMELOG) corrective regimen sliding scale   SubCutaneous three times a day before meals  insulin lispro (ADMELOG) corrective regimen sliding scale   SubCutaneous at bedtime  nystatin    Suspension 474970 Unit(s) Oral four times a day  potassium phosphate / sodium phosphate Powder (PHOS-NaK) 2 Packet(s) Oral once  predniSONE   Tablet 5 milliGRAM(s) Oral daily  remdesivir  IVPB   IV Intermittent   remdesivir  IVPB 100 milliGRAM(s) IV Intermittent every 24 hours  sertraline 100 milliGRAM(s) Oral daily  trimethoprim   80 mG/sulfamethoxazole 400 mG 1 Tablet(s) Oral daily  valGANciclovir 450 milliGRAM(s) Oral daily        PHYSICAL EXAM:   Vital Signs:  Vital Signs Last 24 Hrs  T(C): 37.2 (21 Dec 2021 04:41), Max: 38.7 (20 Dec 2021 13:52)  T(F): 98.9 (21 Dec 2021 04:41), Max: 101.6 (20 Dec 2021 13:52)  HR: 97 (21 Dec 2021 05:49) (66 - 98)  BP: 131/65 (21 Dec 2021 04:41) (110/55 - 146/68)  BP(mean): --  RR: 18 (21 Dec 2021 04:41) (18 - 21)  SpO2: 92% (21 Dec 2021 05:49) (91% - 96%)  Daily     Daily     GENERAL:  No acute distress  HEENT:  NCAT, no scleral icterus   CHEST:  no respiratory distress  HEART:  Regular rate and rhythm  ABDOMEN:  Soft, non-tender, non-distended, normoactive bowel sounds,  no masses  EXTREMITIES: No edema  SKIN:  No rash/erythema/ecchymoses/petechiae/wounds/abscess/warm/dry  NEURO:  Alert and oriented x 3, no asterixis    LABS:                        6.7    4.62  )-----------( 141      ( 21 Dec 2021 06:49 )             21.3     Mean Cell Volume: 84.2 fl (- @ 06:49)        132<L>  |  100  |  32<H>  ----------------------------<  179<H>  3.7   |  17<L>  |  1.96<H>    Ca    8.2<L>      21 Dec 2021 07:14  Phos  1.7       Mg     1.5         TPro  6.0  /  Alb  2.8<L>  /  TBili  0.4  /  DBili  0.1  /  AST  70<H>  /  ALT  66<H>  /  AlkPhos  151<H>      LIVER FUNCTIONS - ( 21 Dec 2021 07:13 )  Alb: 2.8 g/dL / Pro: 6.0 g/dL / ALK PHOS: 151 U/L / ALT: 66 U/L / AST: 70 U/L / GGT: x           PT/INR - ( 21 Dec 2021 08:03 )   PT: 15.2 sec;   INR: 1.28 ratio           Urinalysis Basic - ( 20 Dec 2021 12:42 )    Color: Yellow / Appearance: Slightly Turbid / S.016 / pH: x  Gluc: x / Ketone: Negative  / Bili: Negative / Urobili: Negative   Blood: x / Protein: 100 mg/dL / Nitrite: Negative   Leuk Esterase: Large / RBC: 6 /hpf /  /HPF   Sq Epi: x / Non Sq Epi: 1 /hpf / Bacteria: Negative            Imaging:

## 2021-12-21 NOTE — PROGRESS NOTE ADULT - ASSESSMENT
65 y/o M PMHx DDLT (07/2020, c/b post-op VRE bacteremia), prior ESBL e.coli prostate abscess, R heel OM (12/2020), C. 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

## 2021-12-21 NOTE — PROGRESS NOTE ADULT - SUBJECTIVE AND OBJECTIVE BOX
Follow Up:  COVID19    Interval History: afebrile overnight. continued cough.     REVIEW OF SYSTEMS  [  ] ROS unobtainable because:    [ x ] All other systems negative except as noted below:	    Constitutional:  [x ] fever [ x] chills  [ ] weight loss  [ ] weakness  Skin:  [ ] rash [ ] phlebitis	  Eyes: [ ] icterus [ ] pain  [ ] discharge	  ENMT: [x ] sore throat  [ ] thrush [ ] ulcers [ ] exudates  Respiratory: [ ] dyspnea [ ] hemoptysis [x ] cough [ ] sputum	  Cardiovascular:  [ ] chest pain [ ] palpitations [ ] edema	  Gastrointestinal:  [ ] nausea [ ] vomiting [ ] diarrhea [ ] constipation [ ] pain	  Genitourinary:  [ ] dysuria [ ] frequency [ ] hematuria [ ] discharge [ ] flank pain  [ ] incontinence  Musculoskeletal:  [ ] myalgias [ ] arthralgias [ ] arthritis  [ ] back pain  Neurological:  [ ] headache [ ] seizures  [ ] confusion/altered mental status    Allergies  codeine (Anaphylaxis)        ANTIMICROBIALS:  cefepime   IVPB 1000 every 12 hours  cefepime   IVPB    nystatin    Suspension 349914 four times a day  remdesivir  IVPB    remdesivir  IVPB 100 every 24 hours  trimethoprim   80 mG/sulfamethoxazole 400 mG 1 daily  valGANciclovir 450 daily      OTHER MEDS:  MEDICATIONS  (STANDING):  aspirin enteric coated 81 daily  atorvastatin 80 at bedtime  benzonatate 100 every 8 hours PRN  dextrose 40% Gel 15 once  dextrose 50% Injectable 25 once  dextrose 50% Injectable 12.5 once  dextrose 50% Injectable 25 once  everolimus (ZORTRESS) 4 two times a day  glucagon  Injectable 1 once  heparin   Injectable 5000 every 8 hours  insulin lispro (ADMELOG) corrective regimen sliding scale  three times a day before meals  insulin lispro (ADMELOG) corrective regimen sliding scale  at bedtime  pantoprazole  Injectable 40 two times a day  predniSONE   Tablet 5 daily  sertraline 100 daily      Vital Signs Last 24 Hrs  T(C): 36.3 (21 Dec 2021 16:51), Max: 37.6 (21 Dec 2021 11:14)  T(F): 97.4 (21 Dec 2021 16:51), Max: 99.7 (21 Dec 2021 11:14)  HR: 67 (21 Dec 2021 16:51) (66 - 98)  BP: 129/64 (21 Dec 2021 16:51) (119/62 - 136/63)  BP(mean): --  RR: 19 (21 Dec 2021 16:51) (18 - 21)  SpO2: 96% (21 Dec 2021 16:51) (91% - 96%)    PHYSICAL EXAMINATION:  General: Alert and Awake, NAD  HEENT: PERRL, EOMI  Cardiac: RRR, No M/R/G  Resp: Rales at lung bases, no wheezes or rhonchi.   Abdomen: NBS, NT/ND, No HSM, No rigidity or guarding  MSK: No LE edema. No stigmata of IE. No evidence of phlebitis. No evidence of synovitis.  : No bob  Skin: No rashes or lesions. Skin is warm and dry to the touch.   Neuro: Alert and Awake. CN 2-12 Grossly intact. Moves all four extremities spontaneously.  Psych: Calm, Pleasant, Cooperative    LABORATORY:                          7.7    4.31  )-----------( 128      ( 21 Dec 2021 16:13 )             24.0           132<L>  |  100  |  32<H>  ----------------------------<  179<H>  3.7   |  17<L>  |  1.96<H>    Ca    8.2<L>      21 Dec 2021 07:14  Phos  1.7       Mg     1.5         TPro  6.0  /  Alb  2.8<L>  /  TBili  0.4  /  DBili  0.1  /  AST  70<H>  /  ALT  66<H>  /  AlkPhos  151<H>        Urinalysis Basic - ( 20 Dec 2021 12:42 )    Color: Yellow / Appearance: Slightly Turbid / S.016 / pH: x  Gluc: x / Ketone: Negative  / Bili: Negative / Urobili: Negative   Blood: x / Protein: 100 mg/dL / Nitrite: Negative   Leuk Esterase: Large / RBC: 6 /hpf /  /HPF   Sq Epi: x / Non Sq Epi: 1 /hpf / Bacteria: Negative    C-Reactive Protein, Serum: 141 mg/L (21 @ 07:14)    D-Dimer Assay, Quantitative: 598 ng/mL DDU (21 @ 08:03)    Procalcitonin, Serum: 0.33 ng/mL (21 @ 12:42)    MICROBIOLOGY:    Rapid RVP Result: Detected ( @ 00:38)    RADIOLOGY:    <The imaging below has been reviewed and visualized by me independently. Findings as detailed in report below>    < from: CT Chest No Cont (21 @ 22:49) >  IMPRESSION:    No CT evidence of acute traumatic sequelae within the chest.    Patchy bilateral nodular and groundglass airspace opacities, most   prominent within the left upper and lower lobes, concerning for   multifocal pneumonia. Right apical irregular nodular opacity, new since   2020. Findings may represent additional focus of   infection/inflammation, however short-term follow-up after treatment, in   3 months is recommended.    < end of copied text >

## 2021-12-21 NOTE — PROGRESS NOTE ADULT - PROBLEM SELECTOR PLAN 6
- Transplant hepatology consulted  - Transplant ID consulted  - prednisone 5  - cellcept 500 q12  - bactrim, valcyte for prophylaxis - Transplant hepatology consulted  - Transplant surgery consulted  - Transplant ID consulted  - prednisone 5  - Unclear if everolimus should be continued   - cellcept 500 q12 held per recs  - bactrim, valcyte for prophylaxis Mildly elevated admission AST/ALT at 45/66 respectively. Recent ultrasound showed good graft flow.   - Transplant hepatology consult

## 2021-12-21 NOTE — PROGRESS NOTE ADULT - ASSESSMENT
66 year old male PMH DDLT (07/2020, c/b post-op VRE bacteremia), prior ESBL E. coli prostate abscess, R heel OM (12/2020), C. 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    Found to be COVID19 Positive   U/A (12/20) 261 WBC   CT Chest (12/19) Patchy bilateral nodular and ground glass airspace opacities and Right apical irregular nodular opacity    Patient with persistent sore throat and cough which is largely unchanged since 11/2021.   Of note, patient's son tested positive for COVID19 but around time of last ED visit in mid-November 2021  Patient's COVID19 testing (11/29) and RVP (11/19) was Negative    Even if previous symptoms in 11/2021 were from COVID19 he may now have been exposed to Omicron   He has not mounted a detectable antibody response (as of 11/29/21 spike antibody)  He also now has high grade fevers  s/p Regen-CoV 12/20  Also started on Remdesivir    #COVID19  s/p Regen-CoV 12/20  --Maintain COVID19 Isolation Precautions (21 days from positive swab, in light of immunocompromised status)  --Recommend Ferritin, CRP, D-Dimer every 48-72H  --Continue Remdesivir x 5 days total - monitor BUN/Cr and LFT's  --No indication for Dexamethasone at present as O2 saturations WNL    #Fever, Abnormal Urinalysis  Fever likely COVID19 induced (GGO opacities in lungs which may be consistent)  MRSA/MSSA Nasal PCR Positive for MSSA  However, also with pyuria on U/A and new nodular infiltrate on CT Chest in RUL)   --Continue Cefepime 1g IV Q12H for now   --Continue to follow CBC with diff  --Continue to follow temperature curve  --Follow up on preliminary blood and urine cultures    #Abnormal CT Chest (GGO and Irregular Nodular RUL Infiltrate)  Reviewed imaging with radiology today (12/21)  Unclear if GGO changes are secondary to COVID19 of Everolimus toxicity  Irregular Nodular RUL Infiltrate may be atypical manifestation of COVID19 but will obtain workup as below  --Reordered serum Fungitell  --Follow up on Aspergillus Galactomannan and Serum Crypt Ag     I will continue to follow. Please feel free to contact me with any further questions.    Eusebio Pérez M.D.  [facility] Division of Infectious Disease  8AM-5PM: Pager Number [phone]  After Hours (or if no response): Please contact the Infectious Diseases Office at [phone]     The above assessment and plan were discussed with Dr Angel Lee (transplant hepatology)

## 2021-12-21 NOTE — PROGRESS NOTE ADULT - PROBLEM SELECTOR PLAN 8
Home insulin regimen 12 lantus and 10 humalog premeal  - Will start 12 lantus and 7 admelog premeal for now  - SSI  - CTM Home insulin regimen 12 lantus and 10 humalog premeal  - Will hold insulin given glucose has been running low  - SSI  - CTM On home APAP per sleep note on [employer]  - Will allow patient to use CPAP given he is desatting when sleeping per nurse.

## 2021-12-21 NOTE — PROGRESS NOTE ADULT - ATTENDING COMMENTS
Doubt syncope at this time, but patient is not sure. He does have a history of falls and orthostasis. Would be conservative and have PT see him with a TTE and monitor on telemetry. Low suspicion for seizure. Hemoglobin with slight drop, possible fluctuation. Will transfuse to 7 and monitor. ID following and s/p mAB and on Remdesivir & Cefepime. Begin discharge planning once reasonable assure patient without occult infection (aside from COVID that is).

## 2021-12-21 NOTE — PROGRESS NOTE ADULT - ATTENDING COMMENTS
metabolic syndrome, obesity, JEAN cirrhosis s/p liver transplant 7/2/20 w/ CKD on everolimus, MMF, pred 5 mg daily here with 1 month of cough found to have bilateral pulmonary infiltrates and COVID+. Not requiring supplemental oxygen, afebrile, and not short of breath.    Concern for everolimus causing non-infectious pneumonitis vs COVID.  Imaging reviewed with radiology, GGOs more consistent with COVID for which he now on remdisivir but I think the time line of cough for >1 month is more consistent with everolimus pneumonitis. 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

## 2021-12-21 NOTE — PROGRESS NOTE ADULT - PROBLEM SELECTOR PLAN 3
Urinalysis consistent with UTI.  - Will collect urine culture  - Ceftriaxone empirically 5 days total (12/20 - )

## 2021-12-22 ENCOUNTER — TRANSCRIPTION ENCOUNTER (OUTPATIENT)
Age: 66
End: 2021-12-22

## 2021-12-22 LAB
ALBUMIN SERPL ELPH-MCNC: 2.7 G/DL — LOW (ref 3.3–5)
ALBUMIN SERPL ELPH-MCNC: 2.7 G/DL — LOW (ref 3.3–5)
ALP SERPL-CCNC: 138 U/L — HIGH (ref 40–120)
ALP SERPL-CCNC: 138 U/L — HIGH (ref 40–120)
ALT FLD-CCNC: 50 U/L — HIGH (ref 10–45)
ALT FLD-CCNC: 51 U/L — HIGH (ref 10–45)
ANION GAP SERPL CALC-SCNC: 14 MMOL/L — SIGNIFICANT CHANGE UP (ref 5–17)
AST SERPL-CCNC: 36 U/L — SIGNIFICANT CHANGE UP (ref 10–40)
AST SERPL-CCNC: 37 U/L — SIGNIFICANT CHANGE UP (ref 10–40)
BILIRUB DIRECT SERPL-MCNC: 0.1 MG/DL — SIGNIFICANT CHANGE UP (ref 0–0.3)
BILIRUB INDIRECT FLD-MCNC: 0.3 MG/DL — SIGNIFICANT CHANGE UP (ref 0.2–1)
BILIRUB SERPL-MCNC: 0.4 MG/DL — SIGNIFICANT CHANGE UP (ref 0.2–1.2)
BILIRUB SERPL-MCNC: 0.4 MG/DL — SIGNIFICANT CHANGE UP (ref 0.2–1.2)
BUN SERPL-MCNC: 35 MG/DL — HIGH (ref 7–23)
CALCIUM SERPL-MCNC: 8.3 MG/DL — LOW (ref 8.4–10.5)
CHLORIDE SERPL-SCNC: 103 MMOL/L — SIGNIFICANT CHANGE UP (ref 96–108)
CMV DNA CSF QL NAA+PROBE: SIGNIFICANT CHANGE UP
CO2 SERPL-SCNC: 17 MMOL/L — LOW (ref 22–31)
CREAT SERPL-MCNC: 1.98 MG/DL — HIGH (ref 0.5–1.3)
CREAT SERPL-MCNC: 1.99 MG/DL — HIGH (ref 0.5–1.3)
GLUCOSE BLDC GLUCOMTR-MCNC: 121 MG/DL — HIGH (ref 70–99)
GLUCOSE BLDC GLUCOMTR-MCNC: 200 MG/DL — HIGH (ref 70–99)
GLUCOSE BLDC GLUCOMTR-MCNC: 211 MG/DL — HIGH (ref 70–99)
GLUCOSE BLDC GLUCOMTR-MCNC: 223 MG/DL — HIGH (ref 70–99)
GLUCOSE BLDC GLUCOMTR-MCNC: 243 MG/DL — HIGH (ref 70–99)
GLUCOSE SERPL-MCNC: 91 MG/DL — SIGNIFICANT CHANGE UP (ref 70–99)
HCT VFR BLD CALC: 24.3 % — LOW (ref 39–50)
HCT VFR BLD CALC: 24.3 % — LOW (ref 39–50)
HCT VFR BLD CALC: 24.9 % — LOW (ref 39–50)
HGB BLD-MCNC: 7.8 G/DL — LOW (ref 13–17)
HGB BLD-MCNC: 7.9 G/DL — LOW (ref 13–17)
HGB BLD-MCNC: 8.3 G/DL — LOW (ref 13–17)
INR BLD: 1.18 RATIO — HIGH (ref 0.88–1.16)
MAGNESIUM SERPL-MCNC: 1.7 MG/DL — SIGNIFICANT CHANGE UP (ref 1.6–2.6)
MCHC RBC-ENTMCNC: 26.1 PG — LOW (ref 27–34)
MCHC RBC-ENTMCNC: 26.8 PG — LOW (ref 27–34)
MCHC RBC-ENTMCNC: 26.9 PG — LOW (ref 27–34)
MCHC RBC-ENTMCNC: 32.1 GM/DL — SIGNIFICANT CHANGE UP (ref 32–36)
MCHC RBC-ENTMCNC: 32.5 GM/DL — SIGNIFICANT CHANGE UP (ref 32–36)
MCHC RBC-ENTMCNC: 33.3 GM/DL — SIGNIFICANT CHANGE UP (ref 32–36)
MCV RBC AUTO: 80.8 FL — SIGNIFICANT CHANGE UP (ref 80–100)
MCV RBC AUTO: 81.3 FL — SIGNIFICANT CHANGE UP (ref 80–100)
MCV RBC AUTO: 82.4 FL — SIGNIFICANT CHANGE UP (ref 80–100)
NRBC # BLD: 0 /100 WBCS — SIGNIFICANT CHANGE UP (ref 0–0)
PHOSPHATE SERPL-MCNC: 3.2 MG/DL — SIGNIFICANT CHANGE UP (ref 2.5–4.5)
PLATELET # BLD AUTO: 133 K/UL — LOW (ref 150–400)
PLATELET # BLD AUTO: 133 K/UL — LOW (ref 150–400)
PLATELET # BLD AUTO: 139 K/UL — LOW (ref 150–400)
POTASSIUM SERPL-MCNC: 3.7 MMOL/L — SIGNIFICANT CHANGE UP (ref 3.5–5.3)
POTASSIUM SERPL-SCNC: 3.7 MMOL/L — SIGNIFICANT CHANGE UP (ref 3.5–5.3)
PROT SERPL-MCNC: 5.8 G/DL — LOW (ref 6–8.3)
PROT SERPL-MCNC: 5.9 G/DL — LOW (ref 6–8.3)
PROTHROM AB SERPL-ACNC: 14 SEC — HIGH (ref 10.6–13.6)
RBC # BLD: 2.95 M/UL — LOW (ref 4.2–5.8)
RBC # BLD: 2.99 M/UL — LOW (ref 4.2–5.8)
RBC # BLD: 3.08 M/UL — LOW (ref 4.2–5.8)
RBC # FLD: 17 % — HIGH (ref 10.3–14.5)
RBC # FLD: 17.1 % — HIGH (ref 10.3–14.5)
RBC # FLD: 17.1 % — HIGH (ref 10.3–14.5)
SODIUM SERPL-SCNC: 134 MMOL/L — LOW (ref 135–145)
WBC # BLD: 3.47 K/UL — LOW (ref 3.8–10.5)
WBC # BLD: 3.64 K/UL — LOW (ref 3.8–10.5)
WBC # BLD: 4.37 K/UL — SIGNIFICANT CHANGE UP (ref 3.8–10.5)
WBC # FLD AUTO: 3.47 K/UL — LOW (ref 3.8–10.5)
WBC # FLD AUTO: 3.64 K/UL — LOW (ref 3.8–10.5)
WBC # FLD AUTO: 4.37 K/UL — SIGNIFICANT CHANGE UP (ref 3.8–10.5)

## 2021-12-22 PROCEDURE — 99232 SBSQ HOSP IP/OBS MODERATE 35: CPT | Mod: GC

## 2021-12-22 RX ORDER — HEPARIN SODIUM 5000 [USP'U]/ML
5000 INJECTION INTRAVENOUS; SUBCUTANEOUS EVERY 8 HOURS
Refills: 0 | Status: DISCONTINUED | OUTPATIENT
Start: 2021-12-22 | End: 2021-12-30

## 2021-12-22 RX ORDER — PANTOPRAZOLE SODIUM 20 MG/1
40 TABLET, DELAYED RELEASE ORAL
Refills: 0 | Status: DISCONTINUED | OUTPATIENT
Start: 2021-12-22 | End: 2022-01-06

## 2021-12-22 RX ORDER — ATORVASTATIN CALCIUM 80 MG/1
40 TABLET, FILM COATED ORAL AT BEDTIME
Refills: 0 | Status: DISCONTINUED | OUTPATIENT
Start: 2021-12-22 | End: 2022-01-06

## 2021-12-22 RX ORDER — CEFTRIAXONE 500 MG/1
1000 INJECTION, POWDER, FOR SOLUTION INTRAMUSCULAR; INTRAVENOUS EVERY 24 HOURS
Refills: 0 | Status: COMPLETED | OUTPATIENT
Start: 2021-12-22 | End: 2021-12-25

## 2021-12-22 RX ADMIN — Medication 500000 UNIT(S): at 23:00

## 2021-12-22 RX ADMIN — HEPARIN SODIUM 5000 UNIT(S): 5000 INJECTION INTRAVENOUS; SUBCUTANEOUS at 14:40

## 2021-12-22 RX ADMIN — Medication 500000 UNIT(S): at 00:21

## 2021-12-22 RX ADMIN — PANTOPRAZOLE SODIUM 40 MILLIGRAM(S): 20 TABLET, DELAYED RELEASE ORAL at 06:44

## 2021-12-22 RX ADMIN — PANTOPRAZOLE SODIUM 40 MILLIGRAM(S): 20 TABLET, DELAYED RELEASE ORAL at 18:20

## 2021-12-22 RX ADMIN — ATORVASTATIN CALCIUM 40 MILLIGRAM(S): 80 TABLET, FILM COATED ORAL at 21:37

## 2021-12-22 RX ADMIN — Medication 1 TABLET(S): at 12:42

## 2021-12-22 RX ADMIN — EVEROLIMUS 4 MILLIGRAM(S): 10 TABLET ORAL at 18:19

## 2021-12-22 RX ADMIN — Medication 500000 UNIT(S): at 12:41

## 2021-12-22 RX ADMIN — EVEROLIMUS 4 MILLIGRAM(S): 10 TABLET ORAL at 06:43

## 2021-12-22 RX ADMIN — HEPARIN SODIUM 5000 UNIT(S): 5000 INJECTION INTRAVENOUS; SUBCUTANEOUS at 21:37

## 2021-12-22 RX ADMIN — CEFEPIME 100 MILLIGRAM(S): 1 INJECTION, POWDER, FOR SOLUTION INTRAMUSCULAR; INTRAVENOUS at 06:43

## 2021-12-22 RX ADMIN — Medication 500000 UNIT(S): at 19:02

## 2021-12-22 RX ADMIN — Medication 500000 UNIT(S): at 06:44

## 2021-12-22 RX ADMIN — REMDESIVIR 500 MILLIGRAM(S): 5 INJECTION INTRAVENOUS at 18:18

## 2021-12-22 RX ADMIN — CEFTRIAXONE 100 MILLIGRAM(S): 500 INJECTION, POWDER, FOR SOLUTION INTRAMUSCULAR; INTRAVENOUS at 18:24

## 2021-12-22 RX ADMIN — Medication 1: at 12:41

## 2021-12-22 RX ADMIN — Medication 5 MILLIGRAM(S): at 06:44

## 2021-12-22 RX ADMIN — VALGANCICLOVIR 450 MILLIGRAM(S): 450 TABLET, FILM COATED ORAL at 14:39

## 2021-12-22 RX ADMIN — Medication 2: at 18:20

## 2021-12-22 NOTE — PROGRESS NOTE ADULT - PROBLEM SELECTOR PLAN 6
Mildly elevated admission AST/ALT at 45/66 respectively. Recent ultrasound showed good graft flow.   - Transplant hepatology consult

## 2021-12-22 NOTE — CHART NOTE - NSCHARTNOTEFT_GEN_A_CORE
66 year old male PMH DDLT (07/2020, c/b post-op VRE bacteremia), prior ESBL E. coli prostate abscess, R heel OM (12/2020), C. 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    Found to be COVID19 Positive   U/A (12/20) 261 WBC   CT Chest (12/19) Patchy bilateral nodular and ground glass airspace opacities and Right apical irregular nodular opacity    Patient with persistent sore throat and cough which is largely unchanged since 11/2021.   Of note, patient's son tested positive for COVID19 but around time of last ED visit in mid-November 2021  Patient's COVID19 testing (11/29) and RVP (11/19) was Negative    Even if previous symptoms in 11/2021 were from COVID19 he may now have been exposed to Omicron   He has not mounted a detectable antibody response (as of 11/29/21 spike antibody)  He also now has high grade fevers  s/p Regen-CoV 12/20  Also started on Remdesivir    #COVID19  s/p Regen-CoV 12/20  --Maintain COVID19 Isolation Precautions (21 days from positive swab, in light of immunocompromised status)  --Recommend Ferritin, CRP, D-Dimer every 48-72H  --Continue Remdesivir x 5 days total - monitor BUN/Cr and LFT's (Favor completing closer to 5 days of therapy)  --No indication for Dexamethasone at present as O2 saturations WNL    #Fever, Abnormal Urinalysis (but UCx Negative)  Fever likely COVID19 induced (GGO opacities in lungs which may be consistent)  MRSA/MSSA Nasal PCR Positive for MSSA  However, also with new nodular infiltrate on CT Chest in RUL  --BCx, UCx Negative; Deescalate Cefepime to Ceftriaxone 1g IV Q24H (Favor giving 5 day empiric course, 12/21 -->, can be PO Ceftin on discharge)  --Continue to follow CBC with diff  --Continue to follow temperature curve    #Abnormal CT Chest (GGO and Irregular Nodular RUL Infiltrate)  Reviewed imaging with radiology today (12/21)  Unclear if GGO changes are secondary to COVID19 of Everolimus toxicity  Irregular Nodular RUL Infiltrate may be atypical manifestation of COVID19 but will obtain workup as below  Serum Cryptococcal Ag Negative  --Follow up on Aspergillus Galactomannan and Fungitell    I will continue to follow. Please feel free to contact me with any further questions.    Eusebio Pérez M.D.  [facility] Division of Infectious Disease  8AM-5PM: Pager Number [phone]  After Hours (or if no response): Please contact the Infectious Diseases Office at [phone]     The above assessment and plan were discussed with Dr Beard and Dr Mo 9Hepatology)

## 2021-12-22 NOTE — PROGRESS NOTE ADULT - PROBLEM SELECTOR PLAN 9
Home insulin regimen 12 lantus and 10 humalog premeal  - Will hold insulin given glucose has been running low  - SSI  - CTM

## 2021-12-22 NOTE — PROGRESS NOTE ADULT - SUBJECTIVE AND OBJECTIVE BOX
Internal Medicine   Jose Alaniz | PGY-1     OVERNIGHT EVENTS:      SUBJECTIVE:       MEDICATIONS  (STANDING):  atorvastatin 80 milliGRAM(s) Oral at bedtime  cefepime   IVPB 1000 milliGRAM(s) IV Intermittent every 12 hours  cefepime   IVPB      dextrose 40% Gel 15 Gram(s) Oral once  dextrose 5%. 1000 milliLiter(s) (50 mL/Hr) IV Continuous <Continuous>  dextrose 5%. 1000 milliLiter(s) (100 mL/Hr) IV Continuous <Continuous>  dextrose 50% Injectable 25 Gram(s) IV Push once  dextrose 50% Injectable 12.5 Gram(s) IV Push once  dextrose 50% Injectable 25 Gram(s) IV Push once  everolimus (ZORTRESS) 4 milliGRAM(s) Oral two times a day  glucagon  Injectable 1 milliGRAM(s) IntraMuscular once  insulin lispro (ADMELOG) corrective regimen sliding scale   SubCutaneous three times a day before meals  insulin lispro (ADMELOG) corrective regimen sliding scale   SubCutaneous at bedtime  nystatin    Suspension 551952 Unit(s) Oral four times a day  pantoprazole  Injectable 40 milliGRAM(s) IV Push two times a day  predniSONE   Tablet 5 milliGRAM(s) Oral daily  remdesivir  IVPB   IV Intermittent   remdesivir  IVPB 100 milliGRAM(s) IV Intermittent every 24 hours  trimethoprim   80 mG/sulfamethoxazole 400 mG 1 Tablet(s) Oral daily  valGANciclovir 450 milliGRAM(s) Oral daily    MEDICATIONS  (PRN):  benzonatate 100 milliGRAM(s) Oral every 8 hours PRN Cough        T(F): 97.5 (12-22-21 @ 04:45), Max: 99.7 (12-21-21 @ 11:14)  HR: 70 (12-22-21 @ 04:50) (67 - 89)  BP: 126/75 (12-22-21 @ 04:45) (109/81 - 136/63)  BP(mean): --  RR: 21 (12-22-21 @ 04:50) (18 - 21)  SpO2: 97% (12-22-21 @ 04:50) (91% - 97%)    PHYSICAL EXAM:     GENERAL: NAD, lying in bed comfortably  HEAD:  Atraumatic, Normocephalic  EYES: EOMI, PERRLA, conjunctiva and sclera clear, no nystagmus noted  ENT: Moist mucous membranes,   NECK: Supple, No JVD, trachea midline  CHEST/LUNG: Clear to auscultation bilaterally; No rales, rhonchi, wheezing, or rubs. Unlabored respirations  HEART: Regular rate and rhythm; No murmurs, rubs, or gallops, normal S1/S2  ABDOMEN: normal bowel sounds; Soft, nontender, nondistended, no organomegaly   EXTREMITIES:  2+ Peripheral Pulses, brisk capillary refill. No clubbing, cyanosis, or edema  MSK: No gross deformities noted   Neurological:  A&Ox3, no focal deficits   SKIN: No rashes or lesions  PSYCH: Normal mood, affect     TELEMETRY:    LABS:                        7.8    3.64  )-----------( 139      ( 22 Dec 2021 06:28 )             24.3     12-22    134<L>  |  103  |  35<H>  ----------------------------<  91  3.7   |  17<L>  |  1.98<H>    Ca    8.3<L>      22 Dec 2021 06:28  Phos  3.2     12-22  Mg     1.7     12-22    TPro  5.9<L>  /  Alb  2.7<L>  /  TBili  0.4  /  DBili  0.1  /  AST  37  /  ALT  51<H>  /  AlkPhos  138<H>  12-22        PT/INR - ( 22 Dec 2021 06:28 )   PT: 14.0 sec;   INR: 1.18 ratio             Creatinine Trend: 1.98<--, 1.96<--, 1.99<--, 1.95<--, 1.93<--  I&O's Summary    21 Dec 2021 07:01  -  22 Dec 2021 07:00  --------------------------------------------------------  IN: 50 mL / OUT: 1700 mL / NET: -1650 mL      BNP    RADIOLOGY & ADDITIONAL STUDIES:             no... Internal Medicine   [facility] | PGY-1     OVERNIGHT EVENTS: No overnight events      SUBJECTIVE: Patient was seen and examined at bedside this morning. Denies any nausea/vomiting/diarrhea, headache, shortness of breath, abdominal pain or chest pain/palpitations. Patient responding appropriately to questions and able to make needs known. Vital signs/imaging/telemetry events reviewed.       MEDICATIONS  (STANDING):  atorvastatin 80 milliGRAM(s) Oral at bedtime  cefepime   IVPB 1000 milliGRAM(s) IV Intermittent every 12 hours  cefepime   IVPB      dextrose 40% Gel 15 Gram(s) Oral once  dextrose 5%. 1000 milliLiter(s) (50 mL/Hr) IV Continuous <Continuous>  dextrose 5%. 1000 milliLiter(s) (100 mL/Hr) IV Continuous <Continuous>  dextrose 50% Injectable 25 Gram(s) IV Push once  dextrose 50% Injectable 12.5 Gram(s) IV Push once  dextrose 50% Injectable 25 Gram(s) IV Push once  everolimus (ZORTRESS) 4 milliGRAM(s) Oral two times a day  glucagon  Injectable 1 milliGRAM(s) IntraMuscular once  insulin lispro (ADMELOG) corrective regimen sliding scale   SubCutaneous three times a day before meals  insulin lispro (ADMELOG) corrective regimen sliding scale   SubCutaneous at bedtime  nystatin    Suspension 521940 Unit(s) Oral four times a day  pantoprazole  Injectable 40 milliGRAM(s) IV Push two times a day  predniSONE   Tablet 5 milliGRAM(s) Oral daily  remdesivir  IVPB   IV Intermittent   remdesivir  IVPB 100 milliGRAM(s) IV Intermittent every 24 hours  trimethoprim   80 mG/sulfamethoxazole 400 mG 1 Tablet(s) Oral daily  valGANciclovir 450 milliGRAM(s) Oral daily    MEDICATIONS  (PRN):  benzonatate 100 milliGRAM(s) Oral every 8 hours PRN Cough        T(F): 97.5 (12-22-21 @ 04:45), Max: 99.7 (12-21-21 @ 11:14)  HR: 70 (12-22-21 @ 04:50) (67 - 89)  BP: 126/75 (12-22-21 @ 04:45) (109/81 - 136/63)  BP(mean): --  RR: 21 (12-22-21 @ 04:50) (18 - 21)  SpO2: 97% (12-22-21 @ 04:50) (91% - 97%)    PHYSICAL EXAM:     GENERAL: NAD, lying in bed comfortably on room air, coughing  HEAD:  Atraumatic, Normocephalic  EYES: EOMI, PERRLA, conjunctiva and sclera clear  ENT: Moist mucous membranes  NECK: Supple, No JVD  CHEST/LUNG: Clear to auscultation bilaterally; No rales, rhonchi, wheezing, or rubs. Unlabored respirations  HEART: Regular rate and rhythm; No murmurs, rubs, or gallops  ABDOMEN: normal bowel sounds; Soft, nontender, nondistended  EXTREMITIES:  2+ Peripheral Pulses, brisk capillary refill. No clubbing, cyanosis, or edema  Neurological:  A&Ox3, no focal deficits   SKIN: No rashes or lesions  PSYCH: normal affect and mood    TELEMETRY:    LABS:                        7.8    3.64  )-----------( 139      ( 22 Dec 2021 06:28 )             24.3     12-22    134<L>  |  103  |  35<H>  ----------------------------<  91  3.7   |  17<L>  |  1.98<H>    Ca    8.3<L>      22 Dec 2021 06:28  Phos  3.2     12-22  Mg     1.7     12-22    TPro  5.9<L>  /  Alb  2.7<L>  /  TBili  0.4  /  DBili  0.1  /  AST  37  /  ALT  51<H>  /  AlkPhos  138<H>  12-22        PT/INR - ( 22 Dec 2021 06:28 )   PT: 14.0 sec;   INR: 1.18 ratio             Creatinine Trend: 1.98<--, 1.96<--, 1.99<--, 1.95<--, 1.93<--  I&O's Summary    21 Dec 2021 07:01  -  22 Dec 2021 07:00  --------------------------------------------------------  IN: 50 mL / OUT: 1700 mL / NET: -1650 mL      BNP    RADIOLOGY & ADDITIONAL STUDIES:

## 2021-12-22 NOTE — DISCHARGE NOTE PROVIDER - NSDCCPCAREPLAN_GEN_ALL_CORE_FT
PRINCIPAL DISCHARGE DIAGNOSIS  Diagnosis: Fall  Assessment and Plan of Treatment: You had a fall which is what brought you to the hospital. CT head was unremarkable for any signs of a stroke. A TTE of the heart showed good heart function. You were found to have orthostatic blood pressure (blood pressure that drops when you change positions too quickly). You were also found to have a low red blood cell count most likely due to bleeding in your GI tract which could also contribute to falling.      SECONDARY DISCHARGE DIAGNOSES  Diagnosis: 2019 novel coronavirus disease (COVID-19)  Assessment and Plan of Treatment: You came into the hospital having a cough and sore throat. You were found to be positive for covid-19. CT of the chest showed that you could possibly be developing pneumonia. You were able to breathe comfortable on room air without the need for oxygen while awake and only needed CPAP for when you were sleeping due to your sleep apnea. You were started on remdesivir and also received monoclonal antibody infusion.    Diagnosis: Melena  Assessment and Plan of Treatment: You were found to have black (melenotic) stool which could be a sign of a bleed in your GI tract. Your red blood cell count slowly dropped and you received blood after consenting to a blood transfusion. Hepatology/GI decided to _____     PRINCIPAL DISCHARGE DIAGNOSIS  Diagnosis: Fall  Assessment and Plan of Treatment: You had a fall which is what brought you to the hospital. CT head was unremarkable for any signs of a stroke. A TTE of the heart showed good heart function. You were found to have orthostatic blood pressure (blood pressure that drops when you change positions too quickly). You were also found to have a low red blood cell count most likely due to bleeding in your GI tract which could also contribute to falling.      SECONDARY DISCHARGE DIAGNOSES  Diagnosis: 2019 novel coronavirus disease (COVID-19)  Assessment and Plan of Treatment: You came into the hospital having a cough and sore throat. You were found to be positive for covid-19. CT of the chest showed that you could possibly be developing pneumonia. You were able to breathe comfortable on room air without the need for oxygen while awake and only needed CPAP for when you were sleeping due to your sleep apnea. You were started on remdesivir and also received monoclonal antibody infusion.    Diagnosis: Melena  Assessment and Plan of Treatment: You were found to have black (melenotic) stool which could be a sign of a bleed in your GI tract. Your red blood cell count slowly dropped and you received blood after consenting to a blood transfusion. Hepatology/GI decided that there was no need to perform endoscopy. Your melena resolved and you hemoglobin (blood levels) were stable.     PRINCIPAL DISCHARGE DIAGNOSIS  Diagnosis: Fall  Assessment and Plan of Treatment: You had a fall which is what brought you to the hospital. CT head was unremarkable for any signs of a stroke. A TTE of the heart showed good heart function. You were found to have orthostatic blood pressure (blood pressure that drops when you change positions too quickly). You were also found to have a low red blood cell count most likely due to bleeding in your GI tract which could also contribute to falling.      SECONDARY DISCHARGE DIAGNOSES  Diagnosis: 2019 novel coronavirus disease (COVID-19)  Assessment and Plan of Treatment: You came into the hospital having a cough and sore throat. You were found to be positive for covid-19. CT of the chest showed that you could possibly be developing pneumonia. You were able to breathe comfortable on room air without the need for oxygen while awake and only needed CPAP for when you were sleeping due to your sleep apnea. You were started on remdesivir and also received monoclonal antibody infusion. Your covid symptoms worsened and you were started on solumedrol. Your symptoms improved and you were saturating well on room air    Diagnosis: Melena  Assessment and Plan of Treatment: You were found to have black (melenotic) stool which could be a sign of a bleed in your GI tract. Your red blood cell count slowly dropped and you received blood after consenting to a blood transfusion. Hepatology/GI decided that there was no need to perform endoscopy. Your melena resolved and you hemoglobin (blood levels) were stable.     PRINCIPAL DISCHARGE DIAGNOSIS  Diagnosis: Fall  Assessment and Plan of Treatment: You had a fall which is what brought you to the hospital. CT head was unremarkable for any signs of a stroke. A TTE of the heart showed good heart function. You were found to have orthostatic blood pressure (blood pressure that drops when you change positions too quickly). You were also found to have a low red blood cell count most likely due to bleeding in your GI tract which could also contribute to falling. Please follow at [facility].      SECONDARY DISCHARGE DIAGNOSES  Diagnosis: 2019 novel coronavirus disease (COVID-19)  Assessment and Plan of Treatment: You came into the hospital having a cough and sore throat. You were found to be positive for covid-19. CT of the chest showed that you could possibly be developing pneumonia. You were able to breathe comfortable on room air without the need for oxygen while awake and only needed CPAP for when you were sleeping due to your sleep apnea. You were started on remdesivir and also received monoclonal antibody infusion. Your covid symptoms worsened and you were started on dexamethasone. Your symptoms improved and you were saturating well on room air    Diagnosis: Melena  Assessment and Plan of Treatment: You were found to have black (melenotic) stool which could be a sign of a bleed in your GI tract. Your red blood cell count slowly dropped and you received blood after consenting to a blood transfusion. Hepatology/GI decided that there was no need to perform endoscopy. Your melena resolved and you hemoglobin (blood levels) were stable.     PRINCIPAL DISCHARGE DIAGNOSIS  Diagnosis: Fall  Assessment and Plan of Treatment: You had a fall which is what brought you to the hospital. CT head was unremarkable for any signs of a stroke. A TTE of the heart showed good heart function. You were found to have orthostatic blood pressure (blood pressure that drops when you change positions too quickly). You were also found to have a low red blood cell count most likely due to bleeding in your GI tract which could also contribute to falling. Please follow at [facility].      SECONDARY DISCHARGE DIAGNOSES  Diagnosis: 2019 novel coronavirus disease (COVID-19)  Assessment and Plan of Treatment: You came into the hospital having a cough and sore throat. You were found to be positive for covid-19. CT of the chest showed that you could possibly be developing pneumonia. You were able to breathe comfortable on room air without the need for oxygen while awake and only needed CPAP for when you were sleeping due to your sleep apnea. You were started on remdesivir and also received monoclonal antibody infusion. Your covid symptoms worsened and you were started on dexamethasone. Your symptoms improved and you were saturating well on room air    Diagnosis: Melena  Assessment and Plan of Treatment: You were found to have black (melenotic) stool which could be a sign of a bleed in your GI tract. Your red blood cell count slowly dropped and you received blood after consenting to a blood transfusion. Hepatology/GI decided that there was no need to perform endoscopy. Your melena resolved and you hemoglobin (blood levels) were stable.    Diagnosis: Transplanted liver  Assessment and Plan of Treatment: Cellcept was initially held due to infection. Prednisone was held initially due to the need to start decadron for covid infection. Your last dose of decadron is 1/7, after that you can continue prednisone 5 mg daily. Please follow up with liver transplant team within 1 week of discharge from the hospital.    Diagnosis: DVT, lower extremity  Assessment and Plan of Treatment: You were found to have blood clots in the vein in your left leg. You will need to take Eliquis 10 mg twice a day x 7 days (until 1/13) then start taking Eliquis 5 mg twice a day after that. Please follow up with your primary care physician within 1-2 weeks of discharge from hospital.    Diagnosis: Diabetes  Assessment and Plan of Treatment: You are on higher insulin dose due to being on decadron for treatment of covid. High dose steroids can induce hyperglycemia. Your last dose of Decadron is 1/7, after that your insulin regimen will need to be readjusted at rehab. Please follow up with your endocrinologist within 1 week of discharge from hospital.

## 2021-12-22 NOTE — PROGRESS NOTE ADULT - PROBLEM SELECTOR PLAN 5
H/H slowly downtrending. Per nursing, patient had "dark brown" stools. Unfortunately sample was not saved so could not assess. Previous endoscopy was done roughly 1 year ago for melena and was shown to have varices and bleeding  - Blood consent in chart  - Active type and screen  - trend CBC  - pRBC PRN when hgb less than 7  - Gi prophlyaxis: protonix 40 q12   - Will see stool sample to confirm possible melena H/H slowly downtrending. Per nursing, patient had "dark brown" stools. Unfortunately sample was not saved so could not assess. Previous endoscopy was done roughly 1 year ago for melena and was shown to have varices and bleeding  - Blood consent in chart  - Active type and screen  - trend CBC  - pRBC PRN when hgb less than 7  - Gi prophlyaxis: protonix 40 q12   - FOBT positive  - Hep not interested in scoping currently

## 2021-12-22 NOTE — PROGRESS NOTE ADULT - ASSESSMENT
67 y/o M PMHx DDLT (07/2020, c/b post-op VRE bacteremia), prior ESBL e.coli prostate abscess, R heel OM (12/2020), C. 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

## 2021-12-22 NOTE — PROGRESS NOTE ADULT - PROBLEM SELECTOR PLAN 8
On home APAP per sleep note on [employer]  - Will allow patient to use CPAP given he is desatting when sleeping per nurse.

## 2021-12-22 NOTE — DISCHARGE NOTE PROVIDER - HOSPITAL COURSE
HPI:  65 y/o M PMHx DDLT (07/2020, c/b post-op VRE bacteremia), prior ESBL e.coli prostate abscess, R heel OM (12/2020), C. 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 Patient fell on 12/19/21 at around 3:30 pm. Estimates that he was on the floor for roughly 2-3 minutes and was able to call out to son for assistance. He said before the fall he got out of the bed and started walking to his walker to use the restroom to urinate before falling backwards and landing on his pelvis. He denies any trauma to his head, chest, ribs and only endorses landing on his glutes. He says he normally feels lightheaded when he gets up too quickly so he will wait for a moment for lightheadedness to resolve before moving. He denies feeling lightheaded/dizzy the preceding moment of the fall. He said that he urinated on the floor but states that he had to go to the bathroom and this was not unintentional but denies loss of bowel control. He says he felt out of bit until his son laid him down in his bed. He denies any loss of consciousness, mechanical misstep/slip, chest pain, palpitations, SOB, lethargy, fatigue, fever, chills, dysuria, hematuria, any other signs of bleeding such as hematochezia, melena.    Also endorses having a cough and sore throat    In ED, patient initial vitals were 103/55, 88 HR, 16 RR, 37.1 C, 96 O2 on room air (20 Dec 2021 08:58)    Hospital Course: Low suspicion for neurologic cause with CT head being unremarkable. TTE was also unremarkable. Patient was found to have UA consistent with UTI and was started on empiric cefepime per transplant ID recs but urine culture (obtained before antibiotic administration) was not consistent with UTI. Patient also had slightly downtrending H/H and was found to have melenotic stool with positive FOBT. Hepatology recommended _____. Patient was also having cough and sore throat but satting well on room air. He was found to be Covid positive and CT chest findings were concerning for possible beginnings of multifocal pneumonia. Pulmonology was consulted given question of if the CT chest findings were more likely from covid or side effect of everolimus but concluded that this could not be assessed from imaging alone and recommended agaisnt bronchoscopy at this time.   HPI:  65 y/o M PMHx DDLT (07/2020, c/b post-op VRE bacteremia), prior ESBL e.coli prostate abscess, R heel OM (12/2020), C. 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 Patient fell on 12/19/21 at around 3:30 pm. Estimates that he was on the floor for roughly 2-3 minutes and was able to call out to son for assistance. He said before the fall he got out of the bed and started walking to his walker to use the restroom to urinate before falling backwards and landing on his pelvis. He denies any trauma to his head, chest, ribs and only endorses landing on his glutes. He says he normally feels lightheaded when he gets up too quickly so he will wait for a moment for lightheadedness to resolve before moving. He denies feeling lightheaded/dizzy the preceding moment of the fall. He said that he urinated on the floor but states that he had to go to the bathroom and this was not unintentional but denies loss of bowel control. He says he felt out of bit until his son laid him down in his bed. He denies any loss of consciousness, mechanical misstep/slip, chest pain, palpitations, SOB, lethargy, fatigue, fever, chills, dysuria, hematuria, any other signs of bleeding such as hematochezia, melena.    Also endorses having a cough and sore throat    In ED, patient initial vitals were 103/55, 88 HR, 16 RR, 37.1 C, 96 O2 on room air (20 Dec 2021 08:58)    Hospital Course: Low suspicion for neurologic cause with CT head being unremarkable. TTE was also unremarkable. Patient was found to have UA consistent with UTI and was started on empiric cefepime per transplant ID recs but urine culture (obtained before antibiotic administration) was not consistent with UTI. Patient also had slightly downtrending H/H and was found to have melenotic stool with positive FOBT. Hepatology recommended no scope at this time and melena resolved while H/H was stable. Patient was also having cough and sore throat but satting well on room air. He was found to be Covid positive and CT chest findings were concerning for possible beginnings of multifocal pneumonia. Pulmonology was consulted given question of if the CT chest findings were more likely from covid or side effect of everolimus but concluded that this could not be assessed from imaging alone and recommended agaisnt bronchoscopy at this time. Patient satted well on room air throughout his stay and was transferred to rehab.   HPI:  67 y/o M PMHx DDLT (07/2020, c/b post-op VRE bacteremia), prior ESBL e.coli prostate abscess, R heel OM (12/2020), C. 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 Patient fell on 12/19/21 at around 3:30 pm. Estimates that he was on the floor for roughly 2-3 minutes and was able to call out to son for assistance. He said before the fall he got out of the bed and started walking to his walker to use the restroom to urinate before falling backwards and landing on his pelvis. He denies any trauma to his head, chest, ribs and only endorses landing on his glutes. He says he normally feels lightheaded when he gets up too quickly so he will wait for a moment for lightheadedness to resolve before moving. He denies feeling lightheaded/dizzy the preceding moment of the fall. He said that he urinated on the floor but states that he had to go to the bathroom and this was not unintentional but denies loss of bowel control. He says he felt out of bit until his son laid him down in his bed. He denies any loss of consciousness, mechanical misstep/slip, chest pain, palpitations, SOB, lethargy, fatigue, fever, chills, dysuria, hematuria, any other signs of bleeding such as hematochezia, melena.    Also endorses having a cough and sore throat    In ED, patient initial vitals were 103/55, 88 HR, 16 RR, 37.1 C, 96 O2 on room air (20 Dec 2021 08:58)    Hospital Course: Low suspicion for neurologic cause with CT head being unremarkable. TTE was also unremarkable. Patient was found to have UA consistent with UTI and was started on empiric cefepime per transplant ID recs but urine culture (obtained before antibiotic administration) was not consistent with UTI. Patient also had slightly downtrending H/H and was found to have melenotic stool with positive FOBT. Hepatology recommended no scope at this time and melena resolved while H/H was stable. Patient was also having cough and sore throat but satting well on room air. He was found to be Covid positive and CT chest findings were concerning for possible beginnings of multifocal pneumonia. Pulmonology was consulted given question of if the CT chest findings were more likely from covid or side effect of everolimus but concluded that this could not be assessed from imaging alone and recommended agaisnt bronchoscopy at this time. Pt received remdesivir and monoclonal antibioties. Due to worsening covid pna, patient started on solumedrol. LFT was seen to be uptrending and liver biopsy done for concern of rejection. Liver biopsy showed_____   HPI:  65 y/o M PMHx JEAN cirrhosis s/p DDLT (07/2020, c/b post-op VRE bacteremia), prior ESBL e.coli prostate abscess, R heel OM (12/2020), C. Diff (12/2020), DM, MGUS, and HFpEF,  presents status post unwitnessed fall. Patient fell on 12/19/21 at around 3:30 pm. Estimates that he was on the floor for roughly 2-3 minutes and was able to call out to son for assistance. He said before the fall he got out of the bed and started walking to his walker to use the restroom to urinate before falling backwards and landing on his pelvis. He denies any trauma to his head, chest, ribs and only endorses landing on his glutes. He says he normally feels lightheaded when he gets up too quickly so he will wait for a moment for lightheadedness to resolve before moving. He denies feeling lightheaded/dizzy the preceding moment of the fall. He said that he urinated on the floor but states that he had to go to the bathroom and this was not unintentional but denies loss of bowel control. He says he felt out of bit until his son laid him down in his bed. He denies any loss of consciousness, mechanical misstep/slip, chest pain, palpitations, SOB, lethargy, fatigue, fever, chills, dysuria, hematuria, any other signs of bleeding such as hematochezia, melena.    Also endorses having a cough and sore throat    In ED, patient initial vitals were 103/55, 88 HR, 16 RR, 37.1 C, 96 O2 on room air (20 Dec 2021 08:58)    Hospital Course: Low suspicion for neurologic cause with CT head being unremarkable. TTE was also unremarkable. Patient was found to have UA consistent with UTI and was started on empiric cefepime per transplant ID recs but urine culture (obtained before antibiotic administration) was not consistent with UTI. Patient also had slightly downtrending H/H and was found to have melenotic stool with positive FOBT. Hepatology recommended no scope at this time and melena resolved while H/H was stable. Patient was also having cough and sore throat but satting well on room air. He was found to be Covid positive and CT chest findings were concerning for possible beginnings of multifocal pneumonia. Pulmonology was consulted given question of if the CT chest findings were more likely from covid or side effect of everolimus but concluded that this could not be assessed from imaging alone and recommended agaisnt bronchoscopy at this time. Pt received remdesivir and monoclonal antibioties, and on dexamethasone 12/29-1/7. LFT was seen to be uptrending and liver biopsy done for concern of rejection. 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 PCP prophylaxis changed from bactrim to atovaquone for LFTs. Endocrine consulted for steroid induced hyperglycemia. Patient medically optimized for discharge to [facility].   HPI:  65 y/o M PMHx JEAN cirrhosis s/p DDLT (07/2020, c/b post-op VRE bacteremia), prior ESBL e.coli prostate abscess, R heel OM (12/2020), C. Diff (12/2020), DM, MGUS, and HFpEF,  presents status post unwitnessed fall. Patient fell on 12/19/21 at around 3:30 pm. Estimates that he was on the floor for roughly 2-3 minutes and was able to call out to son for assistance. He said before the fall he got out of the bed and started walking to his walker to use the restroom to urinate before falling backwards and landing on his pelvis. He denies any trauma to his head, chest, ribs and only endorses landing on his glutes. He says he normally feels lightheaded when he gets up too quickly so he will wait for a moment for lightheadedness to resolve before moving. He denies feeling lightheaded/dizzy the preceding moment of the fall. He said that he urinated on the floor but states that he had to go to the bathroom and this was not unintentional but denies loss of bowel control. He says he felt out of bit until his son laid him down in his bed. He denies any loss of consciousness, mechanical misstep/slip, chest pain, palpitations, SOB, lethargy, fatigue, fever, chills, dysuria, hematuria, any other signs of bleeding such as hematochezia, melena.    Also endorses having a cough and sore throat    In ED, patient initial vitals were 103/55, 88 HR, 16 RR, 37.1 C, 96 O2 on room air (20 Dec 2021 08:58)    Hospital Course: Low suspicion for neurologic cause with CT head being unremarkable. TTE was also unremarkable. Patient was found to have UA consistent with UTI and was started on empiric cefepime per transplant ID recs but urine culture (obtained before antibiotic administration) was not consistent with UTI. Patient also had slightly downtrending H/H and was found to have melenotic stool with positive FOBT. Hepatology recommended no scope at this time and melena resolved while H/H was stable. Patient was also having cough and sore throat but satting well on room air. He was found to be Covid positive and CT chest findings were concerning for possible beginnings of multifocal pneumonia. Pulmonology was consulted given question of if the CT chest findings were more likely from covid or side effect of everolimus but concluded that this could not be assessed from imaging alone and recommended agaisnt bronchoscopy at this time. Pt received remdesivir and monoclonal antibioties, and on dexamethasone 12/29-1/7. LFT was seen to be uptrending and liver biopsy done for concern of rejection. 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 PCP prophylaxis changed from bactrim to atovaquone for LFTs. Endocrine consulted for steroid induced hyperglycemia. Patient was found to have DVT on L femoral vein and started on full treatment AC. Patient medically optimized for discharge to [facility].     Follow up:  -His last dose of decadron 6 mg would be 1/6. Starting 1/8: back to prednisone 5 mg  -Atovaquone should be discontinued after being off decadron, last dose of atovaquone is 1/7  -start on Eliquis 10 mg BID on 1/6 - 1/13. Starting on 1/14 transition to Eliquis 5 mg BID   -continue current insulin regimen at discharge with moderate sliding scale. Starting 1/8, once off decadron, lower Lantus to 20 units and Admelog 12 units with meals with moderate sliding scale. Need to be further adjusted at rehab after that.

## 2021-12-22 NOTE — PROGRESS NOTE ADULT - ATTENDING COMMENTS
Patient seen and examined at bedside. No acute events overnight. Patient responded well to one unit pRBC and currently that this is blood loss anemia. He is on Cefepime, but blood culture and urine culture negative thus far. He is on Remdesivir, but no hypoxic. Orthostatics positive and TTE unremarkable in a gentleman with well documented history of falls. PT saw patient. Currently on Everolimus & Prednisone, holding CellCept. It is looking more and more like patient has maximized his inpatient benefit. For his orthostasis he should continue to perform lifestyle modification such as abdominal binder, compression stockings, and changing positions slowing to allow his body to equilibrate. We will speak to hepatology and ID, but I expect discharge soon. I don't think there is anything else that can be done which will minimize his risk of falling. Prepare for discharge. Patient seen and examined at bedside. No acute events overnight. Patient responded well to one unit pRBC Possibly had melanotic event yesterday. He is on Cefepime, but blood culture and urine culture negative thus far. He is on Remdesivir, but no hypoxic. Orthostatics positive and TTE unremarkable in a gentleman with well documented history of falls. PT saw patient. Currently on Everolimus & Prednisone, holding CellCept. For his orthostasis he should continue to perform lifestyle modification such as abdominal binder, compression stockings, and changing positions slowing to allow his body to equilibrate. Will discuss with hepatology about EGD

## 2021-12-22 NOTE — DISCHARGE NOTE PROVIDER - NSFOLLOWUPCLINICS_GEN_ALL_ED_FT
Gastroenterology at [facility]  Gastroenterology  [address] [place] [address]  Phone: [phone]  Fax:   Follow Up Time: 1 week    [facility] Endocrinology  Endocrinology  [address] [place] [id number]  Phone: [phone]  Fax:   Follow Up Time: 1 week    [facility] General Internal Medicine  General Internal Medicine  [address] [place] [address]  Phone: [phone]  Fax:   Follow Up Time: 1 week

## 2021-12-22 NOTE — PROGRESS NOTE ADULT - PROBLEM SELECTOR PLAN 11
DVT: hep subq  Diet: consistent carb, DASH  Dispo: DVT: hep subq held in setting of possible GI bleed  Diet: consistent carb, DASH  Dispo:

## 2021-12-22 NOTE — PROGRESS NOTE ADULT - PROBLEM SELECTOR PLAN 10
Home 20 rosuvastatin, aspiring 81 for preventative medicine  - atorvastatin 80  - aspirin 81 Home 20 rosuvastatin, aspiring 81 for preventative medicine  - atorvastatin 80  - aspirin 81 held given possible GI bleed

## 2021-12-22 NOTE — PROGRESS NOTE ADULT - PROBLEM SELECTOR PLAN 4
CT chest concerning for possible beginnings of multifocal pneumonia. May be the beginnings of covid pneumonia vs everolimus side effect  - See above (coronavirus)  - Pulm consult: are lung findings secondary to covid or everolimus side effect? Unable to be determined by imaging  - Will CTM for now

## 2021-12-22 NOTE — PROGRESS NOTE ADULT - PROBLEM SELECTOR PLAN 2
Cough and sore throat for the past few days. RVP positive for covid at this visit  - Currently in PICU (Covid unit)  - satting well on room air  - cough drops PRN  - on remdesivir (12/20 - )  - s/p monoclonal antibody infusion (12/20)  - CT

## 2021-12-22 NOTE — DISCHARGE NOTE PROVIDER - NSDCMRMEDTOKEN_GEN_ALL_CORE_FT
aspirin 81 mg oral delayed release tablet: 1 tab(s) orally once a day  CellCept 500 mg oral tablet: 1 tab(s) orally 2 times a day  everolimus 1 mg oral tablet: 4 tab(s) orally 2 times a day  folic acid 1 mg oral tablet: 1 tab(s) orally once a day  HumaLOG KwikPen 100 units/mL injectable solution: 10 unit(s) injectable 3 times a day (before meals)  Lantus 100 units/mL subcutaneous solution: 12 unit(s) subcutaneous once a day (at bedtime)  Lovaza 1000 mg oral capsule: 2 cap(s) orally 2 times a day  magnesium oxide 400 mg (241.3 mg elemental magnesium) oral tablet: 1 tab(s) orally once a day (before a meal)  NIFEdipine 30 mg oral tablet, extended release: 1 tab(s) orally once a day  predniSONE 5 mg oral tablet: 1 tab(s) orally once a day  Protonix 40 mg oral delayed release tablet: 1 tab(s) orally once a day  rosuvastatin 20 mg oral tablet: 1 tab(s) orally once a day  Valcyte 450 mg oral tablet: 1 tab(s) orally once a day   Zetia 10 mg oral tablet: 1 tab(s) orally once a day  Zoloft 100 mg oral tablet: 1 tab(s) orally once a day   aspirin 81 mg oral delayed release tablet: 1 tab(s) orally once a day  atovaquone 750 mg/5 mL oral suspension: 10 milliliter(s) orally once a day  benzonatate 100 mg oral capsule: 1 cap(s) orally every 8 hours, As needed, Cough  dexamethasone 6 mg oral tablet: 1 tab(s) orally once a day  everolimus 1 mg oral tablet: 3 tab(s) orally 2 times a day  ezetimibe 10 mg oral tablet: 1 tab(s) orally once a day  folic acid 1 mg oral tablet: 1 tab(s) orally once a day  insulin glargine: 46 unit(s) subcutaneous once a day (at bedtime)  insulin lispro 100 units/mL injectable solution: 29 unit(s) injectable 3 times a day before meals   Lovaza 1000 mg oral capsule: 2 cap(s) orally 2 times a day  mycophenolate mofetil 250 mg oral capsule: 500 milligram(s) orally 2 times a day  nystatin 100,000 units/mL oral suspension: 5 milliliter(s) orally 4 times a day  pantoprazole 40 mg oral delayed release tablet: 1 tab(s) orally 2 times a day  rosuvastatin 20 mg oral tablet: 1 tab(s) orally once a day  Valcyte 450 mg oral tablet: 1 tab(s) orally once a day   Zoloft 100 mg oral tablet: 1 tab(s) orally once a day   aspirin 81 mg oral delayed release tablet: 1 tab(s) orally once a day  atovaquone 750 mg/5 mL oral suspension: 10 milliliter(s) orally once a day  benzonatate 100 mg oral capsule: 1 cap(s) orally every 8 hours, As needed, Cough  dexamethasone 6 mg oral tablet: 1 tab(s) orally once a day last dose jan 7th  everolimus 1 mg oral tablet: 3 tab(s) orally 2 times a day  ezetimibe 10 mg oral tablet: 1 tab(s) orally once a day  folic acid 1 mg oral tablet: 1 tab(s) orally once a day  insulin glargine: 46 unit(s) subcutaneous once a day (at bedtime)  insulin lispro 100 units/mL injectable solution: 29 unit(s) injectable 3 times a day before meals   Lovaza 1000 mg oral capsule: 2 cap(s) orally 2 times a day  mycophenolate mofetil 250 mg oral capsule: 500 milligram(s) orally 2 times a day  nystatin 100,000 units/mL oral suspension: 5 milliliter(s) orally 4 times a day  pantoprazole 40 mg oral delayed release tablet: 1 tab(s) orally 2 times a day  rosuvastatin 20 mg oral tablet: 1 tab(s) orally once a day  Valcyte 450 mg oral tablet: 1 tab(s) orally once a day   Zoloft 100 mg oral tablet: 1 tab(s) orally once a day

## 2021-12-22 NOTE — PROGRESS NOTE ADULT - PROBLEM SELECTOR PLAN 1
Unclear etiology of the fall. Patient denies loss of consciousness. Low suspicion of a cardiogenic cause with unremarkable trops and EKG. Denies mechanical causes. Patient said that he was "out of it" when he fell but did not lose consciousness. He also said that he urinated on the floor but claims this was intentional. Neurologic cause?   - Neurology following  - Orthostatic blood pressures were positive for orthostatic hypotension  - TTE  - Telemetry  - Infectious work up: Sputum culture, urine culture, blood cultures  - Pt consult Unclear etiology of the fall. Patient denies loss of consciousness. Low suspicion of a cardiogenic cause with unremarkable trops and EKG. Denies mechanical causes. Patient said that he was "out of it" when he fell but did not lose consciousness. He also said that he urinated on the floor but claims this was intentional. Neurologic cause?   - Neurology following  - Orthostatic blood pressures were positive for orthostatic hypotension  - TTE unremarkable  - Telemetry  - Infectious work up: Sputum culture, urine culture, blood cultures so far NGTD  - Pt consult

## 2021-12-22 NOTE — DISCHARGE NOTE PROVIDER - CARE PROVIDER_API CALL
AGUSTIN MURDOCK [facility]  Cardiology  [address] [place] [address]  Phone: [phone]  Fax: [phone]  Follow Up Time: 1 week

## 2021-12-22 NOTE — PROGRESS NOTE ADULT - PROBLEM SELECTOR PLAN 3
Urinalysis consistent with UTI.  - Will collect urine culture  - Ceftriaxone empirically 5 days total (12/20 - ) Urinalysis consistent with UTI.  - Urine culture <10,000 normal urogenital ninfa  - Cefipime empirically (12/20 - )

## 2021-12-22 NOTE — DISCHARGE NOTE PROVIDER - NSDCFUSCHEDAPPT_GEN_ALL_CORE_FT
JESSICA MARTIN ; 01/04/2022 ; [facility] [address] JESSICA García ; 02/15/2022 ; [facility] [address] [facility] JESSICA García ; 03/17/2022 ; [facility] JESS[facility] M[facility] ; 01/18/2022 ; [facility] [address] JESSICA García ; 02/15/2022 ; [facility] [address] [facility] JESSICA García ; 03/17/2022 ; [facility]

## 2021-12-22 NOTE — DISCHARGE NOTE PROVIDER - DETAILS OF MALNUTRITION DIAGNOSIS/DIAGNOSES
This patient has been assessed with a concern for Malnutrition and was treated during this hospitalization for the following Nutrition diagnosis/diagnoses:     -  12/28/2021: Moderate protein-calorie malnutrition

## 2021-12-22 NOTE — PROGRESS NOTE ADULT - ATTENDING COMMENTS
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 Hepatic allograft function stable. Continue everolimus 4 mg po q12h and prednisone 5 mg po daily. Currently holding home MMF due to COVID-19 pneumonia. Continue prophylaxis with Bactrim SS (previously on atovaquone at home due to remote history of hyperkalemia on Bactrim but appears to be tolerating resumed Bactrim thus far) and Valcyte. S/p 1 unit PRBCs yesterday with appropriate response, with FOBT positive yesterday but no overt GI bleeding. Can consider repeat endoscopic evaluations for his chronic anemia on an outpatient basis after he has fully recovered from COVID-19. He will also need repeat chest imaging in a few months for follow-up of the patchy ground-glass and nodule pulmonary opacities to rule out everolimus pneumonitis.    Please don't hesitate to call with any questions or concerns.    Letitia Mo M.D., Ph.D.  Transplant Hepatology  Cell: [phone]

## 2021-12-22 NOTE — PROGRESS NOTE ADULT - SUBJECTIVE AND OBJECTIVE BOX
Gastroenterology/Hepatology Progress Note      Interval Events:   - yesterday patient w/ Hg 6.9, transfused 1u pRBC w/ appropriate response  - last night patient w/ dark bowel movement, started on pantoprazole 40mg IV BID, this morning patient w/ brown bowel movement  - no new complaints     Allergies:  codeine (Anaphylaxis)      Hospital Medications:  atorvastatin 80 milliGRAM(s) Oral at bedtime  benzonatate 100 milliGRAM(s) Oral every 8 hours PRN  cefepime   IVPB 1000 milliGRAM(s) IV Intermittent every 12 hours  cefepime   IVPB      dextrose 40% Gel 15 Gram(s) Oral once  dextrose 5%. 1000 milliLiter(s) IV Continuous <Continuous>  dextrose 5%. 1000 milliLiter(s) IV Continuous <Continuous>  dextrose 50% Injectable 25 Gram(s) IV Push once  dextrose 50% Injectable 12.5 Gram(s) IV Push once  dextrose 50% Injectable 25 Gram(s) IV Push once  everolimus (ZORTRESS) 4 milliGRAM(s) Oral two times a day  glucagon  Injectable 1 milliGRAM(s) IntraMuscular once  heparin   Injectable 5000 Unit(s) SubCutaneous every 8 hours  insulin lispro (ADMELOG) corrective regimen sliding scale   SubCutaneous three times a day before meals  insulin lispro (ADMELOG) corrective regimen sliding scale   SubCutaneous at bedtime  nystatin    Suspension 677493 Unit(s) Oral four times a day  pantoprazole  Injectable 40 milliGRAM(s) IV Push two times a day  predniSONE   Tablet 5 milliGRAM(s) Oral daily  remdesivir  IVPB   IV Intermittent   remdesivir  IVPB 100 milliGRAM(s) IV Intermittent every 24 hours  trimethoprim   80 mG/sulfamethoxazole 400 mG 1 Tablet(s) Oral daily  valGANciclovir 450 milliGRAM(s) Oral daily        PHYSICAL EXAM:   Vital Signs:  Vital Signs Last 24 Hrs  T(C): 36.4 (22 Dec 2021 11:28), Max: 36.9 (21 Dec 2021 19:27)  T(F): 97.6 (22 Dec 2021 11:28), Max: 98.5 (21 Dec 2021 19:27)  HR: 83 (22 Dec 2021 11:28) (67 - 89)  BP: 125/72 (22 Dec 2021 11:28) (109/81 - 136/63)  BP(mean): --  RR: 21 (22 Dec 2021 11:28) (18 - 21)  SpO2: 95% (22 Dec 2021 11:28) (95% - 97%)  Daily     Daily     GENERAL:  No acute distress  HEENT:  NCAT, no scleral icterus  CHEST: no resp distress  HEART:  RRR  ABDOMEN:  Soft, non-tender, non-distended, normoactive bowel sounds, no masses  EXTREMITIES:  No cyanosis, clubbing, or edema  SKIN:  No rash/erythema/ecchymoses/petechiae/wounds/abscess/warm/dry  NEURO:  Alert and oriented x 3, no asterixis, no tremor    LABS:                        7.8    3.64  )-----------( 139      ( 22 Dec 2021 06:28 )             24.3     Mean Cell Volume: 81.3 fl (- @ 06:28)        134<L>  |  103  |  35<H>  ----------------------------<  91  3.7   |  17<L>  |  1.98<H>    Ca    8.3<L>      22 Dec 2021 06:28  Phos  3.2       Mg     1.7         TPro  5.9<L>  /  Alb  2.7<L>  /  TBili  0.4  /  DBili  0.1  /  AST  37  /  ALT  51<H>  /  AlkPhos  138<H>      LIVER FUNCTIONS - ( 22 Dec 2021 06:28 )  Alb: 2.7 g/dL / Pro: 5.9 g/dL / ALK PHOS: 138 U/L / ALT: 51 U/L / AST: 37 U/L / GGT: x           PT/INR - ( 22 Dec 2021 06:28 )   PT: 14.0 sec;   INR: 1.18 ratio           Urinalysis Basic - ( 20 Dec 2021 12:42 )    Color: Yellow / Appearance: Slightly Turbid / S.016 / pH: x  Gluc: x / Ketone: Negative  / Bili: Negative / Urobili: Negative   Blood: x / Protein: 100 mg/dL / Nitrite: Negative   Leuk Esterase: Large / RBC: 6 /hpf /  /HPF   Sq Epi: x / Non Sq Epi: 1 /hpf / Bacteria: Negative            Imaging:

## 2021-12-22 NOTE — PROGRESS NOTE ADULT - ASSESSMENT
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 Presented to ED 11/19/21 with negative workup; this current presentation now found to be COVID positive w/ CT chest showing patchy groundglass opacities. DDx for symptoms include COVID (though had symptoms of cough 1 month ago) vs everolimus pneumonitis. 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 Patient was seen by pulmonary, unable to differentiate between everolimus pneumonitis and COVID. Case was reviewed with radiology - believes that CT findings more consistent with COVID (patchy ground glass infiltrates) and not everolimus pneumonitis (no fibrosis). Would recommend treating for COVID (remdesivir) and continuing current immunosuppression with plan to re-image in a short interval. If patient's respiratory symptoms worsen, will readdress role of holding everolimus and possibly higher doses of steroids for pneumonitis.     # DM - poorly controlled DM, A1c 10.1    # anemia - patient w/ acute on chronic anemia, had dark stools, though unclear if melena (green around edges) and on iron; will continue w/ supportive management, iron supplementation, transfusions prn, no plans for endoscopic evaluation at this time    Recommendations:  - c/w everolimus 4mg BID  - hold cellcept   - ok to continue with prednisone 5mg qd  - ok to give remdesivir per protocol  - iron studies, if iron deficient would benefit from IV iron while inpatient   - pantropazole 40mg PO BID  - no plans for endoscopic evaluation   - c/w syncope workup per neuro  - glycemic control (may benefit from endocrine consult; patient does not currently follow w/ endo)  - trend CMP, INR  - rest of care per primary team    Hepatology will continue to follow.     Charles Salgado, PGY5  Gastroenterology/Hepatology Fellow  Available on Microsoft Teams  [phone] ([employer] Short Range Pager)  [phone] (Long Range Pager)    After 5pm, please contact the on-call GI fellow. [phone]   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 (course c/b VRE bacteremia, CNI toxicity to both tacro and cyclosporine; switched to everolimus 7/27/20), HLD, obesity, HFpEF presenting w/ fall    # fall - likely 2/2 pulmonary symptoms; trop negative, TTE (poor study) negative, no events on tele, no EEG, no brain MRI    # pulmonary symptoms - patient w/ 1+ month of upper respiratory symptoms, of cough. Presented to ED 11/19/21 with negative workup; this current presentation now found to be COVID positive w/ CT chest showing patchy groundglass opacities. DDx for symptoms include COVID (though had symptoms of cough 1 month ago) vs everolimus pneumonitis. 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 Patient was seen by pulmonary, unable to differentiate between everolimus pneumonitis and COVID. Case was reviewed with radiology - believes that CT findings more consistent with COVID (patchy ground glass infiltrates) and not everolimus pneumonitis (no fibrosis). Would recommend treating for COVID (remdesivir) and continuing current immunosuppression with plan to re-image in a short interval. If patient's respiratory symptoms worsen, will readdress role of holding everolimus and possibly higher doses of steroids for pneumonitis.     # DM - poorly controlled DM, A1c 10.1    # anemia - patient w/ acute on chronic anemia, had dark stools, though unclear if melena (green around edges) and on iron; will continue w/ supportive management, iron supplementation, transfusions prn, no plans for endoscopic evaluation at this time    Recommendations:  - c/w everolimus 4mg q12h  - hold cellcept   - ok to continue with prednisone 5mg qd  - ok to give remdesivir per protocol  - iron studies, if iron deficient would benefit from IV iron while inpatient   - pantropazole 40mg PO BID  - no plans for endoscopic evaluation   - c/w syncope workup per neuro  - glycemic control (may benefit from endocrine consult; patient does not currently follow w/ endo)  - trend CMP, INR  - rest of care per primary team    Hepatology will continue to follow.     Charles Salgado, PGY5  Gastroenterology/Hepatology Fellow  Available on Microsoft Teams  [phone] ([employer] Short Range Pager)  [phone] (Long Range Pager)    After 5pm, please contact the on-call GI fellow. [phone]

## 2021-12-22 NOTE — PROGRESS NOTE ADULT - PROBLEM SELECTOR PLAN 7
- Transplant hepatology consulted  - Transplant surgery consulted  - Transplant ID consulted  - prednisone 5  - Unclear if everolimus should be continued   - cellcept 500 q12 held per recs  - bactrim, valcyte for prophylaxis - Transplant hepatology consulted  - Transplant surgery consulted  - Transplant ID consulted  - prednisone 5  - everolimus  - cellcept 500 q12 held per recs  - bactrim, valcyte for prophylaxis

## 2021-12-23 DIAGNOSIS — N30.10 INTERSTITIAL CYSTITIS (CHRONIC) WITHOUT HEMATURIA: ICD-10-CM

## 2021-12-23 LAB
ALBUMIN SERPL ELPH-MCNC: 2.8 G/DL — LOW (ref 3.3–5)
ALBUMIN SERPL ELPH-MCNC: 2.8 G/DL — LOW (ref 3.3–5)
ALP SERPL-CCNC: 151 U/L — HIGH (ref 40–120)
ALP SERPL-CCNC: 151 U/L — HIGH (ref 40–120)
ALT FLD-CCNC: 37 U/L — SIGNIFICANT CHANGE UP (ref 10–45)
ALT FLD-CCNC: 39 U/L — SIGNIFICANT CHANGE UP (ref 10–45)
ANION GAP SERPL CALC-SCNC: 13 MMOL/L — SIGNIFICANT CHANGE UP (ref 5–17)
AST SERPL-CCNC: 23 U/L — SIGNIFICANT CHANGE UP (ref 10–40)
AST SERPL-CCNC: 25 U/L — SIGNIFICANT CHANGE UP (ref 10–40)
BASOPHILS # BLD AUTO: 0.01 K/UL — SIGNIFICANT CHANGE UP (ref 0–0.2)
BASOPHILS NFR BLD AUTO: 0.2 % — SIGNIFICANT CHANGE UP (ref 0–2)
BILIRUB DIRECT SERPL-MCNC: 0.2 MG/DL — SIGNIFICANT CHANGE UP (ref 0–0.3)
BILIRUB INDIRECT FLD-MCNC: 0.1 MG/DL — LOW (ref 0.2–1)
BILIRUB SERPL-MCNC: 0.3 MG/DL — SIGNIFICANT CHANGE UP (ref 0.2–1.2)
BILIRUB SERPL-MCNC: 0.3 MG/DL — SIGNIFICANT CHANGE UP (ref 0.2–1.2)
BUN SERPL-MCNC: 38 MG/DL — HIGH (ref 7–23)
CALCIUM SERPL-MCNC: 8.1 MG/DL — LOW (ref 8.4–10.5)
CHLORIDE SERPL-SCNC: 102 MMOL/L — SIGNIFICANT CHANGE UP (ref 96–108)
CO2 SERPL-SCNC: 18 MMOL/L — LOW (ref 22–31)
CREAT SERPL-MCNC: 2 MG/DL — HIGH (ref 0.5–1.3)
CREAT SERPL-MCNC: 2.02 MG/DL — HIGH (ref 0.5–1.3)
EOSINOPHIL # BLD AUTO: 0.02 K/UL — SIGNIFICANT CHANGE UP (ref 0–0.5)
EOSINOPHIL NFR BLD AUTO: 0.5 % — SIGNIFICANT CHANGE UP (ref 0–6)
FERRITIN SERPL-MCNC: 1638 NG/ML — HIGH (ref 30–400)
FERRITIN SERPL-MCNC: 2626 NG/ML — HIGH (ref 30–400)
FUNGITELL: <31 PG/ML — SIGNIFICANT CHANGE UP
GALACTOMANNAN AG SERPL-ACNC: 0.05 INDEX — SIGNIFICANT CHANGE UP (ref 0–0.49)
GLUCOSE BLDC GLUCOMTR-MCNC: 158 MG/DL — HIGH (ref 70–99)
GLUCOSE BLDC GLUCOMTR-MCNC: 167 MG/DL — HIGH (ref 70–99)
GLUCOSE BLDC GLUCOMTR-MCNC: 168 MG/DL — HIGH (ref 70–99)
GLUCOSE BLDC GLUCOMTR-MCNC: 184 MG/DL — HIGH (ref 70–99)
GLUCOSE BLDC GLUCOMTR-MCNC: 194 MG/DL — HIGH (ref 70–99)
GLUCOSE SERPL-MCNC: 139 MG/DL — HIGH (ref 70–99)
HCT VFR BLD CALC: 24.1 % — LOW (ref 39–50)
HGB BLD-MCNC: 8 G/DL — LOW (ref 13–17)
IMM GRANULOCYTES NFR BLD AUTO: 1 % — SIGNIFICANT CHANGE UP (ref 0–1.5)
INR BLD: 1.17 RATIO — HIGH (ref 0.88–1.16)
IRON SATN MFR SERPL: 22 % — SIGNIFICANT CHANGE UP (ref 16–55)
IRON SATN MFR SERPL: 27 UG/DL — LOW (ref 45–165)
LYMPHOCYTES # BLD AUTO: 0.87 K/UL — LOW (ref 1–3.3)
LYMPHOCYTES # BLD AUTO: 21.5 % — SIGNIFICANT CHANGE UP (ref 13–44)
MAGNESIUM SERPL-MCNC: 1.7 MG/DL — SIGNIFICANT CHANGE UP (ref 1.6–2.6)
MCHC RBC-ENTMCNC: 26.9 PG — LOW (ref 27–34)
MCHC RBC-ENTMCNC: 33.2 GM/DL — SIGNIFICANT CHANGE UP (ref 32–36)
MCV RBC AUTO: 81.1 FL — SIGNIFICANT CHANGE UP (ref 80–100)
MONOCYTES # BLD AUTO: 0.23 K/UL — SIGNIFICANT CHANGE UP (ref 0–0.9)
MONOCYTES NFR BLD AUTO: 5.7 % — SIGNIFICANT CHANGE UP (ref 2–14)
NEUTROPHILS # BLD AUTO: 2.87 K/UL — SIGNIFICANT CHANGE UP (ref 1.8–7.4)
NEUTROPHILS NFR BLD AUTO: 71.1 % — SIGNIFICANT CHANGE UP (ref 43–77)
NRBC # BLD: 0 /100 WBCS — SIGNIFICANT CHANGE UP (ref 0–0)
PHOSPHATE SERPL-MCNC: 3 MG/DL — SIGNIFICANT CHANGE UP (ref 2.5–4.5)
PLATELET # BLD AUTO: 148 K/UL — LOW (ref 150–400)
POTASSIUM SERPL-MCNC: 3.9 MMOL/L — SIGNIFICANT CHANGE UP (ref 3.5–5.3)
POTASSIUM SERPL-SCNC: 3.9 MMOL/L — SIGNIFICANT CHANGE UP (ref 3.5–5.3)
PROT SERPL-MCNC: 5.8 G/DL — LOW (ref 6–8.3)
PROT SERPL-MCNC: 6.1 G/DL — SIGNIFICANT CHANGE UP (ref 6–8.3)
PROTHROM AB SERPL-ACNC: 13.9 SEC — HIGH (ref 10.6–13.6)
RBC # BLD: 2.97 M/UL — LOW (ref 4.2–5.8)
RBC # FLD: 16.6 % — HIGH (ref 10.3–14.5)
SODIUM SERPL-SCNC: 133 MMOL/L — LOW (ref 135–145)
TIBC SERPL-MCNC: 121 UG/DL — LOW (ref 220–430)
TRANSFERRIN SERPL-MCNC: 95 MG/DL — LOW (ref 200–360)
UIBC SERPL-MCNC: 94 UG/DL — LOW (ref 110–370)
WBC # BLD: 4.04 K/UL — SIGNIFICANT CHANGE UP (ref 3.8–10.5)
WBC # FLD AUTO: 4.04 K/UL — SIGNIFICANT CHANGE UP (ref 3.8–10.5)

## 2021-12-23 PROCEDURE — 99233 SBSQ HOSP IP/OBS HIGH 50: CPT | Mod: GC

## 2021-12-23 PROCEDURE — 99232 SBSQ HOSP IP/OBS MODERATE 35: CPT | Mod: GC

## 2021-12-23 PROCEDURE — 99232 SBSQ HOSP IP/OBS MODERATE 35: CPT

## 2021-12-23 RX ADMIN — VALGANCICLOVIR 450 MILLIGRAM(S): 450 TABLET, FILM COATED ORAL at 18:04

## 2021-12-23 RX ADMIN — REMDESIVIR 500 MILLIGRAM(S): 5 INJECTION INTRAVENOUS at 17:33

## 2021-12-23 RX ADMIN — Medication 1 TABLET(S): at 17:14

## 2021-12-23 RX ADMIN — HEPARIN SODIUM 5000 UNIT(S): 5000 INJECTION INTRAVENOUS; SUBCUTANEOUS at 14:46

## 2021-12-23 RX ADMIN — EVEROLIMUS 4 MILLIGRAM(S): 10 TABLET ORAL at 17:31

## 2021-12-23 RX ADMIN — Medication 5 MILLIGRAM(S): at 06:35

## 2021-12-23 RX ADMIN — Medication 500000 UNIT(S): at 23:24

## 2021-12-23 RX ADMIN — Medication 500000 UNIT(S): at 11:51

## 2021-12-23 RX ADMIN — CEFTRIAXONE 100 MILLIGRAM(S): 500 INJECTION, POWDER, FOR SOLUTION INTRAMUSCULAR; INTRAVENOUS at 17:32

## 2021-12-23 RX ADMIN — ATORVASTATIN CALCIUM 40 MILLIGRAM(S): 80 TABLET, FILM COATED ORAL at 21:43

## 2021-12-23 RX ADMIN — Medication 1: at 14:52

## 2021-12-23 RX ADMIN — Medication 1: at 08:37

## 2021-12-23 RX ADMIN — HEPARIN SODIUM 5000 UNIT(S): 5000 INJECTION INTRAVENOUS; SUBCUTANEOUS at 21:43

## 2021-12-23 RX ADMIN — Medication 500000 UNIT(S): at 17:32

## 2021-12-23 RX ADMIN — PANTOPRAZOLE SODIUM 40 MILLIGRAM(S): 20 TABLET, DELAYED RELEASE ORAL at 17:32

## 2021-12-23 RX ADMIN — Medication 1: at 17:31

## 2021-12-23 RX ADMIN — Medication 100 MILLIGRAM(S): at 21:43

## 2021-12-23 RX ADMIN — PANTOPRAZOLE SODIUM 40 MILLIGRAM(S): 20 TABLET, DELAYED RELEASE ORAL at 06:35

## 2021-12-23 RX ADMIN — HEPARIN SODIUM 5000 UNIT(S): 5000 INJECTION INTRAVENOUS; SUBCUTANEOUS at 06:35

## 2021-12-23 RX ADMIN — Medication 500000 UNIT(S): at 06:35

## 2021-12-23 RX ADMIN — EVEROLIMUS 4 MILLIGRAM(S): 10 TABLET ORAL at 06:35

## 2021-12-23 NOTE — PROGRESS NOTE ADULT - ATTENDING COMMENTS
1. Covid positive  with GGO likely representing Covid pneumonia. less likely pneumonitis. Pt remains on immunosuppression and is doing well. oxygenation remains 98% on room air.  Pt's main complaint is cough. No need for steroids for lungs.  2. SHENG. Encourage pt to use his CPAP device.

## 2021-12-23 NOTE — PROGRESS NOTE ADULT - ATTENDING COMMENTS
metabolic syndrome, obesity, JEAN cirrhosis s/p liver transplant 7/2/20 w/ CKD on everolimus, MMF, pred 5 mg daily here with 1 month of cough found to have bilateral pulmonary infiltrates and COVID+. Not requiring supplemental oxygen, afebrile, and not short of breath.    Concern for everolimus causing non-infectious pneumonitis vs COVID.  Imaging reviewed with radiology, GGOs more consistent with COVID for which he now on remdisivir but I think the time line of cough for >1 month is more consistent with everolimus pneumonitis. Unfortunately, we do not have great options after everolimus for immunosuppression due to a history of neurotoxicity from CNI  Recommend holding MMF and continuing EVR and pred 5 for now.  Finishing remdisivir treatment tmrw and planning for discharge.  Needs follow-up CT in 1-2 months. If worsening or develops hypoxia/fevers/shortness of breath then we will need to seriously consider stopping everolimus.

## 2021-12-23 NOTE — CHART NOTE - NSCHARTNOTEFT_GEN_A_CORE
Discussed case management. Likely convulsive syncope. Patient should have EEG and MRI however can be done outpatient and does not need to hold up discharge. Can follow up neurology outpatient: [address] [place] [address] [phone]. Neurology to sign off, please call as needed

## 2021-12-23 NOTE — PROGRESS NOTE ADULT - SUBJECTIVE AND OBJECTIVE BOX
Patient is a 66y old  Male who presents with a chief complaint of Fall (23 Dec 2021 06:50)      SUBJECTIVE / OVERNIGHT EVENTS:    Patient seen and examined at bedside. No acute events overnight.    T(F): 98.9 (12-23 @ 04:25), Max: 98.9 (12-23 @ 04:25)  HR: 77 (12-23 @ 05:58) (71 - 83)  BP: 131/70 (12-23 @ 04:25) (125/72 - 144/71)  RR: 18 (12-23 @ 04:25) (18 - 21)  SpO2: 92% (12-23 @ 05:58) (92% - 96%)    I&O's Summary    22 Dec 2021 07:01  -  23 Dec 2021 07:00  --------------------------------------------------------  IN: 0 mL / OUT: 1400 mL / NET: -1400 mL        MEDICATIONS  (STANDING):  atorvastatin 40 milliGRAM(s) Oral at bedtime  cefTRIAXone   IVPB 1000 milliGRAM(s) IV Intermittent every 24 hours  dextrose 40% Gel 15 Gram(s) Oral once  dextrose 5%. 1000 milliLiter(s) (50 mL/Hr) IV Continuous <Continuous>  dextrose 5%. 1000 milliLiter(s) (100 mL/Hr) IV Continuous <Continuous>  dextrose 50% Injectable 25 Gram(s) IV Push once  dextrose 50% Injectable 12.5 Gram(s) IV Push once  dextrose 50% Injectable 25 Gram(s) IV Push once  everolimus (ZORTRESS) 4 milliGRAM(s) Oral two times a day  glucagon  Injectable 1 milliGRAM(s) IntraMuscular once  heparin   Injectable 5000 Unit(s) SubCutaneous every 8 hours  insulin lispro (ADMELOG) corrective regimen sliding scale   SubCutaneous three times a day before meals  insulin lispro (ADMELOG) corrective regimen sliding scale   SubCutaneous at bedtime  nystatin    Suspension 376205 Unit(s) Oral four times a day  pantoprazole    Tablet 40 milliGRAM(s) Oral two times a day  predniSONE   Tablet 5 milliGRAM(s) Oral daily  remdesivir  IVPB 100 milliGRAM(s) IV Intermittent every 24 hours  remdesivir  IVPB   IV Intermittent   trimethoprim   80 mG/sulfamethoxazole 400 mG 1 Tablet(s) Oral daily  valGANciclovir 450 milliGRAM(s) Oral daily    MEDICATIONS  (PRN):  benzonatate 100 milliGRAM(s) Oral every 8 hours PRN Cough      PHYSICAL EXAM:   GEN: Age appropriate, resting comfortably in bed, no acute distress, non toxic appearing, speaking in complete sentences.   HEENT: Conjunctiva and sclera normal  PULM: Lungs CTAB, no wheezes, rales, rhonchi  CV: RRR, S1S2, no MRG  MSK: no stiffness or joint effusions  Abdominal: Soft, nontender to palpation, non-distended, +BS  Extremities: No edema or cyanosis  NEURO: AAOx3  Psych: normal affect, normal behavior  Skin: No rashes, lesions    LABS:  Labs personally reviewed.                        8.0    4.04  )-----------( 148      ( 23 Dec 2021 07:03 )             24.1     Hgb Trend: 8.0<--, 7.9<--, 7.8<--, 8.3<--, 7.7<--  12-23    133<L>  |  102  |  38<H>  ----------------------------<  139<H>  3.9   |  18<L>  |  2.02<H>    Ca    8.1<L>      23 Dec 2021 07:07  Phos  3.0     12-23  Mg     1.7     12-23    TPro  5.8<L>  /  Alb  2.8<L>  /  TBili  0.3  /  DBili  x   /  AST  25  /  ALT  37  /  AlkPhos  151<H>  12-23    Creatinine Trend: 2.02<--, 2.00<--, 1.98<--, 1.96<--, 1.99<--, 1.95<--  PT/INR - ( 23 Dec 2021 07:03 )   PT: 13.9 sec;   INR: 1.17 ratio                 [facility]  Pulmonary and Critical Care Fellow    PGY-5 Pager: [facility] [place][phone]  [facility]-[phone]  [facility] Pulmonary Spectra [phone]   Night Float:

## 2021-12-23 NOTE — PROGRESS NOTE ADULT - ASSESSMENT
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 Presented to ED 11/19/21 with negative workup; this current presentation now found to be COVID positive w/ CT chest showing patchy groundglass opacities. DDx for symptoms include COVID (though had symptoms of cough 1 month ago) vs everolimus pneumonitis. 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 Patient was seen by pulmonary, unable to differentiate between everolimus pneumonitis and COVID. Case was reviewed with radiology - believes that CT findings more consistent with COVID (patchy ground glass infiltrates) and not everolimus pneumonitis (no fibrosis). Would recommend treating for COVID (remdesivir) and continuing current immunosuppression with plan to re-image in a short interval. If patient's respiratory symptoms worsen, will readdress role of holding everolimus and possibly higher doses of steroids for pneumonitis.     # DM - poorly controlled DM, A1c 10.1    # anemia - patient w/ acute on chronic anemia, had dark stools, though unclear if melena (green around edges) and on iron; will continue w/ supportive management, iron supplementation, transfusions prn, no plans for endoscopic evaluation at this time    Recommendations:  - c/w everolimus 4mg q12h  - hold cellcept   - ok to continue with prednisone 5mg qd  - ok to give remdesivir per protocol  - pantropazole 40mg PO BID  - no plans for endoscopic evaluation   - c/w syncope workup per neuro  - glycemic control (may benefit from endocrine consult; patient does not currently follow w/ endo)  - trend CMP, INR  - rest of care per primary team    Hepatology will continue to follow.     Charles Salgado, PGY5  Gastroenterology/Hepatology Fellow  Available on Microsoft Teams  [phone] ([employer] Short Range Pager)  [phone] (Long Range Pager)    After 5pm, please contact the on-call GI fellow. [phone]

## 2021-12-23 NOTE — PROGRESS NOTE ADULT - ASSESSMENT
65 y/o M PMHx DDLT (07/2020, c/b post-op VRE bacteremia), prior ESBL e.coli prostate abscess, R heel OM (12/2020), C. 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 Pulm consulted for abnormal CT chest      Assessment: CT chest reviewed with Chest Radiologist Dr Arreguin. Findings consistent with either COVID or pneumonitis. Not possible to make a distinction between the two based on imaging per Dr Arreguin. In addition, no way to determine the difference between the two from a pulmonary standpoint. No indication for bronchoscopy at this time. Literature review for everolimus reveals it does cause pneumonitis. At this point the patient is not hypoxemic therefore does not require steroids for COVID.     Plan:  Impossible to make a distinction between COVID and everolimus pneumonitis from a pulmonary or radiologic standpoint in the case  Decision to stop everolimus and start steroids per transplant hepatology team and primary team  No indication for bronchoscopy at this time as it will not  or help answer this question  Management of COVID per primary team  No objection to discharge from a pulmonary standpoint  Please call with questions

## 2021-12-23 NOTE — PROGRESS NOTE ADULT - ATTENDING COMMENTS
Patient seen and examined at bedside. No acute events overnight. VSS and not hypoxic. Patient recommended for SANTANA so will gauge his interest. Fall is likely mechanical or related to his orthostasis (well documented history of falls and orthostasis). Recommend lifestyle modification, compression stockings and abdominal binder. For his COVID he got mAB and will finish a full five day course of Remdesivir (12/24). Currently on CTX for possible cystitis? Can discharge to finish five day course on Ceftin. Blood cultures NGTD. Fungitell, Galactomannan and Everolimus level all pending & can be followed up as an outpatient. CT Chest with GGO, recommend outpatient CT scan to ensure resolution. Pulmonary consult appreciated. Also appreciate transplant surgery and hepatology consults.    Earliest patient can be discharged is 12/24. If he wants SANTANA will have to go to a COVID-19 accepting facility or maintain in quarantine for 21 days given immunocompromised status. If this is the case then he will likely become even more deconditioned. I'm not quite sure he will make significant improvement in SANTANA to reduce his risk of falls. Will have informed discussion with patient and come to a consensus.

## 2021-12-23 NOTE — PROGRESS NOTE ADULT - PROBLEM SELECTOR PLAN 4
CT chest concerning for possible beginnings of multifocal pneumonia. May be the beginnings of covid pneumonia vs everolimus side effect  - See above (coronavirus)  - Pulm consult: are lung findings secondary to covid or everolimus side effect? Unable to be determined by imaging  - Will CTM for now Urinalysis consistent with UTI.  - Urine culture <10,000 normal urogenital ninfa

## 2021-12-23 NOTE — PROGRESS NOTE ADULT - PROBLEM SELECTOR PLAN 3
Urinalysis consistent with UTI.  - Urine culture <10,000 normal urogenital ninfa  - Cefipime empirically (12/20 - ) CT chest concerning for possible beginnings of multifocal pneumonia. May be the beginnings of covid pneumonia vs everolimus side effect  - See above (coronavirus)  - Pulm consult: are lung findings secondary to covid or everolimus side effect? Unable to be determined by imaging  - Empiric antibiotics for five days (cefepime 12/21 - 12/25) per ID  - Will CTM for now

## 2021-12-23 NOTE — PROGRESS NOTE ADULT - PROBLEM SELECTOR PLAN 5
H/H slowly downtrending. Per nursing, patient had "dark brown" stools. Unfortunately sample was not saved so could not assess. Previous endoscopy was done roughly 1 year ago for melena and was shown to have varices and bleeding  - Blood consent in chart  - Active type and screen  - trend CBC  - pRBC PRN when hgb less than 7  - Gi prophlyaxis: protonix 40 q12   - FOBT positive  - Hep not interested in scoping currently

## 2021-12-23 NOTE — PROGRESS NOTE ADULT - SUBJECTIVE AND OBJECTIVE BOX
Follow Up:  COVID19    Interval History:    REVIEW OF SYSTEMS  [  ] ROS unobtainable because:    [  ] All other systems negative except as noted below:     Constitutional:  [ ] fever [ ] chills  [ ] weight loss  [ ] weakness  Skin:  [ ] rash [ ] phlebitis	  Eyes: [ ] icterus [ ] pain  [ ] discharge	  ENMT: [ ] sore throat  [ ] thrush [ ] ulcers [ ] exudates  Respiratory: [  ] dyspnea [ ] hemoptysis [ ] cough [ ] sputum	  Cardiovascular:  [ ] chest pain [ ] palpitations [ ] edema	  Gastrointestinal:  [ ] nausea [ ] vomiting [ ] diarrhea [ ] constipation [ ] pain	  Genitourinary:  [ ] dysuria [ ] frequency [ ] hematuria [ ] discharge [ ] flank pain  [ ] incontinence  Musculoskeletal:  [ ] myalgias [ ] arthralgias [ ] arthritis  [ ] back pain  Neurological:  [ ] headache [ ] seizures  [ ] confusion/altered mental status    Allergies  codeine (Anaphylaxis)        ANTIMICROBIALS:  cefTRIAXone   IVPB 1000 every 24 hours  nystatin    Suspension 565135 four times a day  remdesivir  IVPB 100 every 24 hours  remdesivir  IVPB    trimethoprim   80 mG/sulfamethoxazole 400 mG 1 daily  valGANciclovir 450 daily      OTHER MEDS:  MEDICATIONS  (STANDING):  atorvastatin 40 at bedtime  benzonatate 100 every 8 hours PRN  dextrose 40% Gel 15 once  dextrose 50% Injectable 25 once  dextrose 50% Injectable 12.5 once  dextrose 50% Injectable 25 once  everolimus (ZORTRESS) 4 two times a day  glucagon  Injectable 1 once  heparin   Injectable 5000 every 8 hours  insulin lispro (ADMELOG) corrective regimen sliding scale  three times a day before meals  insulin lispro (ADMELOG) corrective regimen sliding scale  at bedtime  pantoprazole    Tablet 40 two times a day  predniSONE   Tablet 5 daily      Vital Signs Last 24 Hrs  T(C): 37.1 (23 Dec 2021 14:38), Max: 37.2 (23 Dec 2021 04:25)  T(F): 98.7 (23 Dec 2021 14:38), Max: 98.9 (23 Dec 2021 04:25)  HR: 75 (23 Dec 2021 14:38) (71 - 78)  BP: 127/90 (23 Dec 2021 14:57) (95/59 - 144/71)  BP(mean): --  RR: 19 (23 Dec 2021 14:38) (18 - 20)  SpO2: 93% (23 Dec 2021 14:38) (92% - 96%)    PHYSICAL EXAMINATION:    General: Alert and Awake, NAD  HEENT: PERRL, EOMI  Neck: Supple  Cardiac: RRR, No M/R/G  Resp: CTAB, No Wh/Rh/Ra  Abdomen: NBS, NT/ND, No HSM, No rigidity or guarding  MSK: No LE edema. No Calf tenderness  : No bob  Skin: No rashes or lesions. Skin is warm and dry to the touch.   Neuro: Alert and Awake. CN 2-12 Grossly intact. Moves all four extremities spontaneously.  Psych: Calm, Pleasant, Cooperative    LABORATORY:                          8.0    4.04  )-----------( 148      ( 23 Dec 2021 07:03 )             24.1       12-23    133<L>  |  102  |  38<H>  ----------------------------<  139<H>  3.9   |  18<L>  |  2.02<H>    Ca    8.1<L>      23 Dec 2021 07:07  Phos  3.0     12-23  Mg     1.7     12-23    TPro  5.8<L>  /  Alb  2.8<L>  /  TBili  0.3  /  DBili  x   /  AST  25  /  ALT  37  /  AlkPhos  151<H>  12-23          C-Reactive Protein, Serum: 141 mg/L (12-21-21 @ 07:14)      Ferritin, Serum: 1638 ng/mL (12-23-21 @ 08:28)  Ferritin, Serum: 2626 ng/mL (12-21-21 @ 12:54)      D-Dimer Assay, Quantitative: 598 ng/mL DDU (12-21-21 @ 08:03)      Procalcitonin, Serum: 0.33 ng/mL (12-20-21 @ 12:42)      MICROBIOLOGY:  v  Clean Catch Clean Catch (Midstream)  12-20-21   <10,000 CFU/mL Normal Urogenital Shantel  --  --      .Blood Blood-Peripheral  12-20-21   No growth to date.  --  --      .Blood Blood-Peripheral  12-20-21   No growth to date.  --  --          Rapid RVP Result: Detected (12-20 @ 00:38)        RADIOLOGY:    <The imaging below has been reviewed and visualized by me independently. Findings as detailed in report below> Follow Up:  COVID19    Interval History: afebrile overnight. cough is slightly improved. denies dyspnea.     REVIEW OF SYSTEMS  [  ] ROS unobtainable because:    [ x ] All other systems negative except as noted below:	    Constitutional:  [x ] fever [ x] chills  [ ] weight loss  [ ] weakness  Skin:  [ ] rash [ ] phlebitis	  Eyes: [ ] icterus [ ] pain  [ ] discharge	  ENMT: [x ] sore throat  [ ] thrush [ ] ulcers [ ] exudates  Respiratory: [ ] dyspnea [ ] hemoptysis [x ] cough [ ] sputum	  Cardiovascular:  [ ] chest pain [ ] palpitations [ ] edema	  Gastrointestinal:  [ ] nausea [ ] vomiting [ ] diarrhea [ ] constipation [ ] pain	  Genitourinary:  [ ] dysuria [ ] frequency [ ] hematuria [ ] discharge [ ] flank pain  [ ] incontinence  Musculoskeletal:  [ ] myalgias [ ] arthralgias [ ] arthritis  [ ] back pain  Neurological:  [ ] headache [ ] seizures  [ ] confusion/altered mental status    Allergies  codeine (Anaphylaxis)        ANTIMICROBIALS:  cefTRIAXone   IVPB 1000 every 24 hours  nystatin    Suspension 912769 four times a day  remdesivir  IVPB 100 every 24 hours  remdesivir  IVPB    trimethoprim   80 mG/sulfamethoxazole 400 mG 1 daily  valGANciclovir 450 daily      OTHER MEDS:  MEDICATIONS  (STANDING):  atorvastatin 40 at bedtime  benzonatate 100 every 8 hours PRN  dextrose 40% Gel 15 once  dextrose 50% Injectable 25 once  dextrose 50% Injectable 12.5 once  dextrose 50% Injectable 25 once  everolimus (ZORTRESS) 4 two times a day  glucagon  Injectable 1 once  heparin   Injectable 5000 every 8 hours  insulin lispro (ADMELOG) corrective regimen sliding scale  three times a day before meals  insulin lispro (ADMELOG) corrective regimen sliding scale  at bedtime  pantoprazole    Tablet 40 two times a day  predniSONE   Tablet 5 daily      Vital Signs Last 24 Hrs  T(C): 37.1 (23 Dec 2021 14:38), Max: 37.2 (23 Dec 2021 04:25)  T(F): 98.7 (23 Dec 2021 14:38), Max: 98.9 (23 Dec 2021 04:25)  HR: 75 (23 Dec 2021 14:38) (71 - 78)  BP: 127/90 (23 Dec 2021 14:57) (95/59 - 144/71)  BP(mean): --  RR: 19 (23 Dec 2021 14:38) (18 - 20)  SpO2: 93% (23 Dec 2021 14:38) (92% - 96%)    PHYSICAL EXAMINATION:  General: Alert and Awake, NAD  HEENT: PERRL, EOMI  Cardiac: RRR, No M/R/G  Resp: Rales at lung bases, no wheezes or rhonchi.   Abdomen: NBS, NT/ND, No HSM, No rigidity or guarding  MSK: No LE edema. No stigmata of IE. No evidence of phlebitis. No evidence of synovitis.  : No bob  Skin: No rashes or lesions. Skin is warm and dry to the touch.   Neuro: Alert and Awake. CN 2-12 Grossly intact. Moves all four extremities spontaneously.  Psych: Calm, Pleasant, Cooperative    LABORATORY:                          8.0    4.04  )-----------( 148      ( 23 Dec 2021 07:03 )             24.1       12-23    133<L>  |  102  |  38<H>  ----------------------------<  139<H>  3.9   |  18<L>  |  2.02<H>    Ca    8.1<L>      23 Dec 2021 07:07  Phos  3.0     12-23  Mg     1.7     12-23    TPro  5.8<L>  /  Alb  2.8<L>  /  TBili  0.3  /  DBili  x   /  AST  25  /  ALT  37  /  AlkPhos  151<H>  12-23          C-Reactive Protein, Serum: 141 mg/L (12-21-21 @ 07:14)      Ferritin, Serum: 1638 ng/mL (12-23-21 @ 08:28)  Ferritin, Serum: 2626 ng/mL (12-21-21 @ 12:54)      D-Dimer Assay, Quantitative: 598 ng/mL DDU (12-21-21 @ 08:03)      Procalcitonin, Serum: 0.33 ng/mL (12-20-21 @ 12:42)      MICROBIOLOGY:  v  Clean Catch Clean Catch (Midstream)  12-20-21   <10,000 CFU/mL Normal Urogenital Shantel  --  --      .Blood Blood-Peripheral  12-20-21   No growth to date.  --  --      .Blood Blood-Peripheral  12-20-21   No growth to date.  --  --          Rapid RVP Result: Detected (12-20 @ 00:38)        RADIOLOGY:    <The imaging below has been reviewed and visualized by me independently. Findings as detailed in report below>    < from: CT Chest No Cont (12.19.21 @ 22:49) >  IMPRESSION:    No CT evidence of acute traumatic sequelae within the chest.    Patchy bilateral nodular and groundglass airspace opacities, most   prominent within the left upper and lower lobes, concerning for   multifocal pneumonia. Right apical irregular nodular opacity, new since   12/22/2020. Findings may represent additional focus of   infection/inflammation, however short-term follow-up after treatment, in   3 months is recommended.    < end of copied text >

## 2021-12-23 NOTE — PROGRESS NOTE ADULT - PROBLEM SELECTOR PLAN 10
Home 20 rosuvastatin, aspiring 81 for preventative medicine  - atorvastatin 80  - aspirin 81 held given possible GI bleed

## 2021-12-23 NOTE — PROGRESS NOTE ADULT - ASSESSMENT
66 year old male PMH DDLT (07/2020, c/b post-op VRE bacteremia), prior ESBL E. coli prostate abscess, R heel OM (12/2020), C. 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    Found to be COVID19 Positive   U/A (12/20) 261 WBC   CT Chest (12/19) Patchy bilateral nodular and ground glass airspace opacities and Right apical irregular nodular opacity    Patient with persistent sore throat and cough which is largely unchanged since 11/2021.   Of note, patient's son tested positive for COVID19 but around time of last ED visit in mid-November 2021  Patient's COVID19 testing (11/29) and RVP (11/19) was Negative    Even if previous symptoms in 11/2021 were from COVID19 he may now have been exposed to Omicron   He has not mounted a detectable antibody response (as of 11/29/21 spike antibody)  He also now has high grade fevers  s/p Regen-CoV 12/20  Also started on Remdesivir    #COVID19  s/p Regen-CoV 12/20  --Maintain COVID19 Isolation Precautions (21 days from positive swab, in light of immunocompromised status)  --Recommend Ferritin, CRP, D-Dimer every 48-72H  --Continue Remdesivir x 5 days total - monitor BUN/Cr and LFT's (Favor 5 days of therapy)  --No indication for Dexamethasone at present as O2 saturations WNL    #Fever, Abnormal Urinalysis (but UCx Negative)  Fever likely COVID19 induced (GGO opacities in lungs which may be consistent)  MRSA/MSSA Nasal PCR Positive for MSSA  However, also with new nodular infiltrate on CT Chest in RUL  --Continue Ceftriaxone 1g IV Q24H (Favor giving 5 day empiric course, 12/21 --> 12/25, can switch to PO Ceftin on discharge)  --Continue to follow CBC with diff  --Continue to follow temperature curve    #Abnormal CT Chest (GGO and Irregular Nodular RUL Infiltrate)  Reviewed imaging with radiology today (12/21)  Unclear if GGO changes are secondary to COVID19 of Everolimus toxicity  Irregular Nodular RUL Infiltrate may be atypical manifestation of COVID19 but will obtain workup as below  Serum Cryptococcal Ag Negative  --Follow up on Aspergillus Galactomannan and Fungitell    I will be away over this upcoming weekend. Please contact the Infectious Diseases Office with any further questions or concerns.     Eusebio Pérez M.D.  [facility] Division of Infectious Disease  8AM-5PM: Pager Number [phone]  After Hours (or if no response): Please contact the Infectious Diseases Office at [phone]

## 2021-12-23 NOTE — PROGRESS NOTE ADULT - PROBLEM SELECTOR PLAN 11
DVT: hep subq held in setting of possible GI bleed  Diet: consistent carb, DASH  Dispo: DVT: hep subq given hypercoaguable state secondary to covid  Diet: consistent carb, DASH  Dispo:

## 2021-12-23 NOTE — PROGRESS NOTE ADULT - SUBJECTIVE AND OBJECTIVE BOX
Gastroenterology/Hepatology Progress Note      Interval Events:   - no events overnight, continues on room air    Allergies:  codeine (Anaphylaxis)      Hospital Medications:  atorvastatin 40 milliGRAM(s) Oral at bedtime  benzonatate 100 milliGRAM(s) Oral every 8 hours PRN  cefTRIAXone   IVPB 1000 milliGRAM(s) IV Intermittent every 24 hours  dextrose 40% Gel 15 Gram(s) Oral once  dextrose 5%. 1000 milliLiter(s) IV Continuous <Continuous>  dextrose 5%. 1000 milliLiter(s) IV Continuous <Continuous>  dextrose 50% Injectable 25 Gram(s) IV Push once  dextrose 50% Injectable 12.5 Gram(s) IV Push once  dextrose 50% Injectable 25 Gram(s) IV Push once  everolimus (ZORTRESS) 4 milliGRAM(s) Oral two times a day  glucagon  Injectable 1 milliGRAM(s) IntraMuscular once  heparin   Injectable 5000 Unit(s) SubCutaneous every 8 hours  insulin lispro (ADMELOG) corrective regimen sliding scale   SubCutaneous three times a day before meals  insulin lispro (ADMELOG) corrective regimen sliding scale   SubCutaneous at bedtime  nystatin    Suspension 335360 Unit(s) Oral four times a day  pantoprazole    Tablet 40 milliGRAM(s) Oral two times a day  predniSONE   Tablet 5 milliGRAM(s) Oral daily  remdesivir  IVPB 100 milliGRAM(s) IV Intermittent every 24 hours  remdesivir  IVPB   IV Intermittent   trimethoprim   80 mG/sulfamethoxazole 400 mG 1 Tablet(s) Oral daily  valGANciclovir 450 milliGRAM(s) Oral daily        PHYSICAL EXAM:   Vital Signs:  Vital Signs Last 24 Hrs  T(C): 37.2 (23 Dec 2021 04:25), Max: 37.2 (23 Dec 2021 04:25)  T(F): 98.9 (23 Dec 2021 04:25), Max: 98.9 (23 Dec 2021 04:25)  HR: 77 (23 Dec 2021 05:58) (71 - 83)  BP: 131/70 (23 Dec 2021 04:25) (125/72 - 144/71)  BP(mean): --  RR: 18 (23 Dec 2021 04:25) (18 - 21)  SpO2: 92% (23 Dec 2021 05:58) (92% - 96%)  Daily     Daily     GENERAL:  No acute distress  HEENT:  NCAT, no scleral icterus  CHEST: no resp distress  HEART:  RRR  ABDOMEN:  Soft, non-tender, non-distended, normoactive bowel sounds, no masses  EXTREMITIES:  No cyanosis, clubbing, or edema  SKIN:  No rash/erythema/ecchymoses/petechiae/wounds/abscess/warm/dry  NEURO:  Alert and oriented x 3, no asterixis, no tremor    LABS:                        8.0    4.04  )-----------( 148      ( 23 Dec 2021 07:03 )             24.1     Mean Cell Volume: 81.1 fl (12-23-21 @ 07:03)    12-23    133<L>  |  102  |  38<H>  ----------------------------<  139<H>  3.9   |  18<L>  |  2.02<H>    Ca    8.1<L>      23 Dec 2021 07:07  Phos  3.0     12-23  Mg     1.7     12-23    TPro  5.8<L>  /  Alb  2.8<L>  /  TBili  0.3  /  DBili  x   /  AST  25  /  ALT  37  /  AlkPhos  151<H>  12-23    LIVER FUNCTIONS - ( 23 Dec 2021 07:07 )  Alb: 2.8 g/dL / Pro: 5.8 g/dL / ALK PHOS: 151 U/L / ALT: 37 U/L / AST: 25 U/L / GGT: x           PT/INR - ( 23 Dec 2021 07:03 )   PT: 13.9 sec;   INR: 1.17 ratio                   Imaging:

## 2021-12-23 NOTE — PROGRESS NOTE ADULT - SUBJECTIVE AND OBJECTIVE BOX
Internal Medicine   Jose Alaniz | PGY-1     OVERNIGHT EVENTS:      SUBJECTIVE:       MEDICATIONS  (STANDING):  atorvastatin 40 milliGRAM(s) Oral at bedtime  cefTRIAXone   IVPB 1000 milliGRAM(s) IV Intermittent every 24 hours  dextrose 40% Gel 15 Gram(s) Oral once  dextrose 5%. 1000 milliLiter(s) (50 mL/Hr) IV Continuous <Continuous>  dextrose 5%. 1000 milliLiter(s) (100 mL/Hr) IV Continuous <Continuous>  dextrose 50% Injectable 25 Gram(s) IV Push once  dextrose 50% Injectable 12.5 Gram(s) IV Push once  dextrose 50% Injectable 25 Gram(s) IV Push once  everolimus (ZORTRESS) 4 milliGRAM(s) Oral two times a day  glucagon  Injectable 1 milliGRAM(s) IntraMuscular once  heparin   Injectable 5000 Unit(s) SubCutaneous every 8 hours  insulin lispro (ADMELOG) corrective regimen sliding scale   SubCutaneous three times a day before meals  insulin lispro (ADMELOG) corrective regimen sliding scale   SubCutaneous at bedtime  nystatin    Suspension 991986 Unit(s) Oral four times a day  pantoprazole    Tablet 40 milliGRAM(s) Oral two times a day  predniSONE   Tablet 5 milliGRAM(s) Oral daily  remdesivir  IVPB 100 milliGRAM(s) IV Intermittent every 24 hours  remdesivir  IVPB   IV Intermittent   trimethoprim   80 mG/sulfamethoxazole 400 mG 1 Tablet(s) Oral daily  valGANciclovir 450 milliGRAM(s) Oral daily    MEDICATIONS  (PRN):  benzonatate 100 milliGRAM(s) Oral every 8 hours PRN Cough        T(F): 98.9 (12-23-21 @ 04:25), Max: 98.9 (12-23-21 @ 04:25)  HR: 71 (12-23-21 @ 04:25) (71 - 83)  BP: 131/70 (12-23-21 @ 04:25) (125/72 - 144/71)  BP(mean): --  RR: 18 (12-23-21 @ 04:25) (18 - 21)  SpO2: 96% (12-23-21 @ 04:25) (93% - 96%)    PHYSICAL EXAM:     GENERAL: NAD, lying in bed comfortably  HEAD:  Atraumatic, Normocephalic  EYES: EOMI, PERRLA, conjunctiva and sclera clear, no nystagmus noted  ENT: Moist mucous membranes,   NECK: Supple, No JVD, trachea midline  CHEST/LUNG: Clear to auscultation bilaterally; No rales, rhonchi, wheezing, or rubs. Unlabored respirations  HEART: Regular rate and rhythm; No murmurs, rubs, or gallops, normal S1/S2  ABDOMEN: normal bowel sounds; Soft, nontender, nondistended, no organomegaly   EXTREMITIES:  2+ Peripheral Pulses, brisk capillary refill. No clubbing, cyanosis, or edema  MSK: No gross deformities noted   Neurological:  A&Ox3, no focal deficits   SKIN: No rashes or lesions  PSYCH: Normal mood, affect     TELEMETRY:    LABS:                        7.9    4.37  )-----------( 133      ( 22 Dec 2021 14:15 )             24.3     12-22    134<L>  |  103  |  35<H>  ----------------------------<  91  3.7   |  17<L>  |  1.98<H>    Ca    8.3<L>      22 Dec 2021 06:28  Phos  3.2     12-22  Mg     1.7     12-22    TPro  5.9<L>  /  Alb  2.7<L>  /  TBili  0.4  /  DBili  0.1  /  AST  37  /  ALT  51<H>  /  AlkPhos  138<H>  12-22        PT/INR - ( 22 Dec 2021 06:28 )   PT: 14.0 sec;   INR: 1.18 ratio             Creatinine Trend: 1.98<--, 1.96<--, 1.99<--, 1.95<--, 1.93<--  I&O's Summary    21 Dec 2021 07:01  -  22 Dec 2021 07:00  --------------------------------------------------------  IN: 50 mL / OUT: 1700 mL / NET: -1650 mL    22 Dec 2021 07:01  -  23 Dec 2021 06:50  --------------------------------------------------------  IN: 0 mL / OUT: 1400 mL / NET: -1400 mL      BNP    RADIOLOGY & ADDITIONAL STUDIES:             Internal Medicine   Fatoumatahildajacob Alaniz | PGY-1     OVERNIGHT EVENTS: No overnight events. Tele: sinus 70-80s      SUBJECTIVE: Patient was seen and examined at bedside this morning. Patient expresses desire to go home instead of rehab. Said that he would consider rehab over the next day because he is still due for remdesivir tm IV. Denies any nausea/vomiting/diarrhea, headache, shortness of breath, abdominal pain or chest pain/palpitations. Patient responding appropriately to questions and able to make needs known. Vital signs/imaging/telemetry events reviewed.       MEDICATIONS  (STANDING):  atorvastatin 40 milliGRAM(s) Oral at bedtime  cefTRIAXone   IVPB 1000 milliGRAM(s) IV Intermittent every 24 hours  dextrose 40% Gel 15 Gram(s) Oral once  dextrose 5%. 1000 milliLiter(s) (50 mL/Hr) IV Continuous <Continuous>  dextrose 5%. 1000 milliLiter(s) (100 mL/Hr) IV Continuous <Continuous>  dextrose 50% Injectable 25 Gram(s) IV Push once  dextrose 50% Injectable 12.5 Gram(s) IV Push once  dextrose 50% Injectable 25 Gram(s) IV Push once  everolimus (ZORTRESS) 4 milliGRAM(s) Oral two times a day  glucagon  Injectable 1 milliGRAM(s) IntraMuscular once  heparin   Injectable 5000 Unit(s) SubCutaneous every 8 hours  insulin lispro (ADMELOG) corrective regimen sliding scale   SubCutaneous three times a day before meals  insulin lispro (ADMELOG) corrective regimen sliding scale   SubCutaneous at bedtime  nystatin    Suspension 557818 Unit(s) Oral four times a day  pantoprazole    Tablet 40 milliGRAM(s) Oral two times a day  predniSONE   Tablet 5 milliGRAM(s) Oral daily  remdesivir  IVPB 100 milliGRAM(s) IV Intermittent every 24 hours  remdesivir  IVPB   IV Intermittent   trimethoprim   80 mG/sulfamethoxazole 400 mG 1 Tablet(s) Oral daily  valGANciclovir 450 milliGRAM(s) Oral daily    MEDICATIONS  (PRN):  benzonatate 100 milliGRAM(s) Oral every 8 hours PRN Cough        T(F): 98.9 (12-23-21 @ 04:25), Max: 98.9 (12-23-21 @ 04:25)  HR: 71 (12-23-21 @ 04:25) (71 - 83)  BP: 131/70 (12-23-21 @ 04:25) (125/72 - 144/71)  BP(mean): --  RR: 18 (12-23-21 @ 04:25) (18 - 21)  SpO2: 96% (12-23-21 @ 04:25) (93% - 96%)    PHYSICAL EXAM:     GENERAL: NAD, lying in bed comfortably on room air, coughing  HEAD:  Atraumatic, Normocephalic  EYES: EOMI, PERRLA, conjunctiva and sclera clear  ENT: Moist mucous membranes  NECK: Supple, No JVD  CHEST/LUNG: Clear to auscultation bilaterally; No rales, rhonchi, wheezing, or rubs. Unlabored respirations  HEART: Regular rate and rhythm; No murmurs, rubs, or gallops  ABDOMEN: normal bowel sounds; Soft, nontender, nondistended  EXTREMITIES:  2+ Peripheral Pulses, brisk capillary refill. No clubbing, cyanosis, or edema  Neurological:  A&Ox3, no focal deficits   SKIN: No rashes or lesions  PSYCH: normal affect and mood    TELEMETRY:    LABS:                        7.9    4.37  )-----------( 133      ( 22 Dec 2021 14:15 )             24.3     12-22    134<L>  |  103  |  35<H>  ----------------------------<  91  3.7   |  17<L>  |  1.98<H>    Ca    8.3<L>      22 Dec 2021 06:28  Phos  3.2     12-22  Mg     1.7     12-22    TPro  5.9<L>  /  Alb  2.7<L>  /  TBili  0.4  /  DBili  0.1  /  AST  37  /  ALT  51<H>  /  AlkPhos  138<H>  12-22        PT/INR - ( 22 Dec 2021 06:28 )   PT: 14.0 sec;   INR: 1.18 ratio             Creatinine Trend: 1.98<--, 1.96<--, 1.99<--, 1.95<--, 1.93<--  I&O's Summary    21 Dec 2021 07:01  -  22 Dec 2021 07:00  --------------------------------------------------------  IN: 50 mL / OUT: 1700 mL / NET: -1650 mL    22 Dec 2021 07:01  -  23 Dec 2021 06:50  --------------------------------------------------------  IN: 0 mL / OUT: 1400 mL / NET: -1400 mL      BNP    RADIOLOGY & ADDITIONAL STUDIES:

## 2021-12-24 LAB
ALBUMIN SERPL ELPH-MCNC: 2.9 G/DL — LOW (ref 3.3–5)
ALBUMIN SERPL ELPH-MCNC: 2.9 G/DL — LOW (ref 3.3–5)
ALP SERPL-CCNC: 176 U/L — HIGH (ref 40–120)
ALP SERPL-CCNC: 176 U/L — HIGH (ref 40–120)
ALT FLD-CCNC: 35 U/L — SIGNIFICANT CHANGE UP (ref 10–45)
ALT FLD-CCNC: 35 U/L — SIGNIFICANT CHANGE UP (ref 10–45)
ANION GAP SERPL CALC-SCNC: 12 MMOL/L — SIGNIFICANT CHANGE UP (ref 5–17)
AST SERPL-CCNC: 26 U/L — SIGNIFICANT CHANGE UP (ref 10–40)
AST SERPL-CCNC: 26 U/L — SIGNIFICANT CHANGE UP (ref 10–40)
BILIRUB DIRECT SERPL-MCNC: <0.1 MG/DL — SIGNIFICANT CHANGE UP (ref 0–0.3)
BILIRUB INDIRECT FLD-MCNC: >0.1 MG/DL — LOW (ref 0.2–1)
BILIRUB SERPL-MCNC: 0.2 MG/DL — SIGNIFICANT CHANGE UP (ref 0.2–1.2)
BILIRUB SERPL-MCNC: 0.2 MG/DL — SIGNIFICANT CHANGE UP (ref 0.2–1.2)
BLD GP AB SCN SERPL QL: NEGATIVE — SIGNIFICANT CHANGE UP
BUN SERPL-MCNC: 40 MG/DL — HIGH (ref 7–23)
CALCIUM SERPL-MCNC: 8.6 MG/DL — SIGNIFICANT CHANGE UP (ref 8.4–10.5)
CHLORIDE SERPL-SCNC: 104 MMOL/L — SIGNIFICANT CHANGE UP (ref 96–108)
CO2 SERPL-SCNC: 19 MMOL/L — LOW (ref 22–31)
CREAT SERPL-MCNC: 1.84 MG/DL — HIGH (ref 0.5–1.3)
CRP SERPL-MCNC: 140 MG/L — HIGH (ref 0–4)
D DIMER BLD IA.RAPID-MCNC: 492 NG/ML DDU — HIGH
GLUCOSE BLDC GLUCOMTR-MCNC: 165 MG/DL — HIGH (ref 70–99)
GLUCOSE BLDC GLUCOMTR-MCNC: 182 MG/DL — HIGH (ref 70–99)
GLUCOSE BLDC GLUCOMTR-MCNC: 183 MG/DL — HIGH (ref 70–99)
GLUCOSE BLDC GLUCOMTR-MCNC: 269 MG/DL — HIGH (ref 70–99)
GLUCOSE SERPL-MCNC: 188 MG/DL — HIGH (ref 70–99)
HCT VFR BLD CALC: 26.5 % — LOW (ref 39–50)
HGB BLD-MCNC: 8.3 G/DL — LOW (ref 13–17)
INR BLD: 1.19 RATIO — HIGH (ref 0.88–1.16)
MAGNESIUM SERPL-MCNC: 1.8 MG/DL — SIGNIFICANT CHANGE UP (ref 1.6–2.6)
MCHC RBC-ENTMCNC: 25.9 PG — LOW (ref 27–34)
MCHC RBC-ENTMCNC: 31.3 GM/DL — LOW (ref 32–36)
MCV RBC AUTO: 82.8 FL — SIGNIFICANT CHANGE UP (ref 80–100)
NRBC # BLD: 0 /100 WBCS — SIGNIFICANT CHANGE UP (ref 0–0)
PHOSPHATE SERPL-MCNC: 3 MG/DL — SIGNIFICANT CHANGE UP (ref 2.5–4.5)
PLATELET # BLD AUTO: 168 K/UL — SIGNIFICANT CHANGE UP (ref 150–400)
POTASSIUM SERPL-MCNC: 4.3 MMOL/L — SIGNIFICANT CHANGE UP (ref 3.5–5.3)
POTASSIUM SERPL-SCNC: 4.3 MMOL/L — SIGNIFICANT CHANGE UP (ref 3.5–5.3)
PROT SERPL-MCNC: 6.4 G/DL — SIGNIFICANT CHANGE UP (ref 6–8.3)
PROT SERPL-MCNC: 6.4 G/DL — SIGNIFICANT CHANGE UP (ref 6–8.3)
PROTHROM AB SERPL-ACNC: 14.2 SEC — HIGH (ref 10.6–13.6)
RBC # BLD: 3.2 M/UL — LOW (ref 4.2–5.8)
RBC # FLD: 16.3 % — HIGH (ref 10.3–14.5)
RH IG SCN BLD-IMP: POSITIVE — SIGNIFICANT CHANGE UP
SODIUM SERPL-SCNC: 135 MMOL/L — SIGNIFICANT CHANGE UP (ref 135–145)
WBC # BLD: 4.21 K/UL — SIGNIFICANT CHANGE UP (ref 3.8–10.5)
WBC # FLD AUTO: 4.21 K/UL — SIGNIFICANT CHANGE UP (ref 3.8–10.5)

## 2021-12-24 PROCEDURE — 99232 SBSQ HOSP IP/OBS MODERATE 35: CPT

## 2021-12-24 RX ADMIN — Medication 1: at 11:56

## 2021-12-24 RX ADMIN — Medication 1: at 16:48

## 2021-12-24 RX ADMIN — REMDESIVIR 500 MILLIGRAM(S): 5 INJECTION INTRAVENOUS at 17:07

## 2021-12-24 RX ADMIN — CEFTRIAXONE 100 MILLIGRAM(S): 500 INJECTION, POWDER, FOR SOLUTION INTRAMUSCULAR; INTRAVENOUS at 17:06

## 2021-12-24 RX ADMIN — Medication 100 MILLIGRAM(S): at 22:02

## 2021-12-24 RX ADMIN — Medication 1: at 08:27

## 2021-12-24 RX ADMIN — Medication 1: at 22:03

## 2021-12-24 RX ADMIN — HEPARIN SODIUM 5000 UNIT(S): 5000 INJECTION INTRAVENOUS; SUBCUTANEOUS at 13:24

## 2021-12-24 RX ADMIN — HEPARIN SODIUM 5000 UNIT(S): 5000 INJECTION INTRAVENOUS; SUBCUTANEOUS at 22:02

## 2021-12-24 RX ADMIN — ATORVASTATIN CALCIUM 40 MILLIGRAM(S): 80 TABLET, FILM COATED ORAL at 22:02

## 2021-12-24 RX ADMIN — PANTOPRAZOLE SODIUM 40 MILLIGRAM(S): 20 TABLET, DELAYED RELEASE ORAL at 17:08

## 2021-12-24 RX ADMIN — EVEROLIMUS 4 MILLIGRAM(S): 10 TABLET ORAL at 17:07

## 2021-12-24 RX ADMIN — PANTOPRAZOLE SODIUM 40 MILLIGRAM(S): 20 TABLET, DELAYED RELEASE ORAL at 05:30

## 2021-12-24 RX ADMIN — Medication 1 TABLET(S): at 11:56

## 2021-12-24 RX ADMIN — Medication 5 MILLIGRAM(S): at 05:30

## 2021-12-24 RX ADMIN — Medication 500000 UNIT(S): at 17:07

## 2021-12-24 RX ADMIN — Medication 500000 UNIT(S): at 11:56

## 2021-12-24 RX ADMIN — VALGANCICLOVIR 450 MILLIGRAM(S): 450 TABLET, FILM COATED ORAL at 11:57

## 2021-12-24 RX ADMIN — EVEROLIMUS 4 MILLIGRAM(S): 10 TABLET ORAL at 05:31

## 2021-12-24 RX ADMIN — HEPARIN SODIUM 5000 UNIT(S): 5000 INJECTION INTRAVENOUS; SUBCUTANEOUS at 05:30

## 2021-12-24 RX ADMIN — Medication 500000 UNIT(S): at 05:30

## 2021-12-24 NOTE — PROGRESS NOTE ADULT - PROBLEM SELECTOR PLAN 2
Cough and sore throat for the past few days. RVP positive for covid at this visit  - Currently in PICU (Covid unit)  - satting well on room air  - cough drops PRN  - on remdesivir (12/20 - 12/25 )  - s/p monoclonal antibody infusion (12/20)  - [facility]

## 2021-12-24 NOTE — PROGRESS NOTE ADULT - PROBLEM SELECTOR PLAN 7
- Transplant hepatology consulted  - Transplant surgery consulted  - Transplant ID consulted  - prednisone 5  - everolimus  - cellcept 500 q12 held per recs  - bactrim, valcyte for prophylaxis

## 2021-12-24 NOTE — PROGRESS NOTE ADULT - ATTENDING COMMENTS
Patient is a 67 y/o M PMHx DDLT (07/2020, c/b post-op VRE bacteremia), prior ESBL e.coli prostate abscess, R heel OM (12/2020), C. Diff (12/2020), DM, MGUS, and HFpEF, decompensated JEAN cirrhosis status post liver transplant 2020 for [facility] presents status post unwitnessed fall, found to be COVID 19 + s/p remdesivir x5 days. Currently on RA, saturating 98%. Of note, given immunocompromised state, patient must isolte for 21 days. On CXR, noted to have infitlrations, c/w ceftriaxone until 12/25. Regarding liver transplant, c/w everlomus and prednisone daily. Concern that everolimus may be causing non infectious pneumonitis, per Transplant Hepatology, recommend continuing everolimus and f/u CT in 1-2 months. PT recommended [facility], d/w CM for possible placement next week

## 2021-12-24 NOTE — PROGRESS NOTE ADULT - PROBLEM SELECTOR PLAN 3
CT chest concerning for possible beginnings of multifocal pneumonia. May be the beginnings of covid pneumonia vs everolimus side effect  - See above (coronavirus)  - Pulm consult: are lung findings secondary to covid or everolimus side effect? Unable to be determined by imaging  - Empiric antibiotics for five days (ceftriaxone 12/21 - 12/25) per ID  - Will CTM for now

## 2021-12-24 NOTE — PROGRESS NOTE ADULT - PROBLEM SELECTOR PLAN 1
Unclear etiology of the fall. Patient denies loss of consciousness. Low suspicion of a cardiogenic cause with unremarkable trops and EKG. Denies mechanical causes. Patient said that he was "out of it" when he fell but did not lose consciousness. He also said that he urinated on the floor but claims this was intentional. Neurologic cause?   - Neurology following  - Orthostatic blood pressures were positive for orthostatic hypotension  - TTE unremarkable  - Telemetry  - Infectious work up: Sputum culture, urine culture, blood cultures so far NGTD  - Pt consult

## 2021-12-24 NOTE — PROGRESS NOTE ADULT - SUBJECTIVE AND OBJECTIVE BOX
Internal Medicine   Jose Alaniz | PGY-1     OVERNIGHT EVENTS: No overnight events. Tele: Sinus 70-90s      SUBJECTIVE: Patient was seen and examined at bedside this morning. Patient amenable to going to acute rehab and seeing psychiatrist today but doesn't wish to stay in the hospital waiting for placement. Will talk to family again about the plan today and come to a final conclusion tomorrow. Denies any nausea/vomiting/diarrhea, headache, shortness of breath, abdominal pain or chest pain/palpitations. Patient responding appropriately to questions and able to make needs known. Vital signs/imaging/telemetry events reviewed.       MEDICATIONS  (STANDING):  atorvastatin 40 milliGRAM(s) Oral at bedtime  cefTRIAXone   IVPB 1000 milliGRAM(s) IV Intermittent every 24 hours  dextrose 40% Gel 15 Gram(s) Oral once  dextrose 5%. 1000 milliLiter(s) (50 mL/Hr) IV Continuous <Continuous>  dextrose 5%. 1000 milliLiter(s) (100 mL/Hr) IV Continuous <Continuous>  dextrose 50% Injectable 25 Gram(s) IV Push once  dextrose 50% Injectable 12.5 Gram(s) IV Push once  dextrose 50% Injectable 25 Gram(s) IV Push once  everolimus (ZORTRESS) 4 milliGRAM(s) Oral two times a day  glucagon  Injectable 1 milliGRAM(s) IntraMuscular once  heparin   Injectable 5000 Unit(s) SubCutaneous every 8 hours  insulin lispro (ADMELOG) corrective regimen sliding scale   SubCutaneous three times a day before meals  insulin lispro (ADMELOG) corrective regimen sliding scale   SubCutaneous at bedtime  nystatin    Suspension [id number] Unit(s) Oral four times a day  pantoprazole    Tablet 40 milliGRAM(s) Oral two times a day  predniSONE   Tablet 5 milliGRAM(s) Oral daily  remdesivir  IVPB 100 milliGRAM(s) IV Intermittent every 24 hours  remdesivir  IVPB   IV Intermittent   trimethoprim   80 mG/sulfamethoxazole 400 mG 1 Tablet(s) Oral daily  valGANciclovir 450 milliGRAM(s) Oral daily    MEDICATIONS  (PRN):  benzonatate 100 milliGRAM(s) Oral every 8 hours PRN Cough        T(F): 98.3 (12-24-21 @ 04:44), Max: 98.8 (12-23-21 @ 21:34)  HR: 74 (12-24-21 @ 04:44) (74 - 80)  BP: 125/68 (12-24-21 @ 04:44) (95/59 - 147/67)  BP(mean): --  RR: 18 (12-24-21 @ 04:44) (18 - 19)  SpO2: 95% (12-24-21 @ 04:44) (92% - 97%)    PHYSICAL EXAM:     GENERAL: NAD, lying in bed comfortably on room air  HEAD:  Atraumatic, Normocephalic  EYES: EOMI, PERRLA, conjunctiva and sclera clear  ENT: Moist mucous membranes  NECK: Supple, No JVD  CHEST/LUNG: Clear to auscultation bilaterally; No rales, rhonchi, wheezing, or rubs. Unlabored respirations  HEART: Regular rate and rhythm; No murmurs, rubs, or gallops  ABDOMEN: normal bowel sounds; Soft, nontender, nondistended  EXTREMITIES:  2+ Peripheral Pulses, brisk capillary refill. No clubbing, cyanosis, or edema  Neurological:  A&Ox3, no focal deficits   SKIN: No rashes or lesions  PSYCH: normal affect and mood    TELEMETRY:    LABS:                        8.0    4.04  )-----------( 148      ( 23 Dec 2021 07:03 )             24.1     12-23    133<L>  |  102  |  38<H>  ----------------------------<  139<H>  3.9   |  18<L>  |  2.02<H>    Ca    8.1<L>      23 Dec 2021 07:07  Phos  3.0     12-23  Mg     1.7     12-23    TPro  5.8<L>  /  Alb  2.8<L>  /  TBili  0.3  /  DBili  x   /  AST  25  /  ALT  37  /  AlkPhos  151<H>  12-23        PT/INR - ( 23 Dec 2021 07:03 )   PT: 13.9 sec;   INR: 1.17 ratio             Creatinine Trend: 2.02<--, 2.00<--, 1.98<--, 1.96<--, 1.99<--, 1.95<--  I&O's Summary    23 Dec 2021 07:01  -  24 Dec 2021 07:00  --------------------------------------------------------  IN: 400 mL / OUT: 2200 mL / NET: -1800 mL      BNP    RADIOLOGY & ADDITIONAL STUDIES:

## 2021-12-25 DIAGNOSIS — F32.9 MAJOR DEPRESSIVE DISORDER, SINGLE EPISODE, UNSPECIFIED: ICD-10-CM

## 2021-12-25 DIAGNOSIS — N18.9 CHRONIC KIDNEY DISEASE, UNSPECIFIED: ICD-10-CM

## 2021-12-25 LAB
ALBUMIN SERPL ELPH-MCNC: 2.6 G/DL — LOW (ref 3.3–5)
ALBUMIN SERPL ELPH-MCNC: 2.7 G/DL — LOW (ref 3.3–5)
ALP SERPL-CCNC: 181 U/L — HIGH (ref 40–120)
ALP SERPL-CCNC: 181 U/L — HIGH (ref 40–120)
ALT FLD-CCNC: 29 U/L — SIGNIFICANT CHANGE UP (ref 10–45)
ALT FLD-CCNC: 32 U/L — SIGNIFICANT CHANGE UP (ref 10–45)
ANION GAP SERPL CALC-SCNC: 13 MMOL/L — SIGNIFICANT CHANGE UP (ref 5–17)
AST SERPL-CCNC: 24 U/L — SIGNIFICANT CHANGE UP (ref 10–40)
AST SERPL-CCNC: 24 U/L — SIGNIFICANT CHANGE UP (ref 10–40)
BASOPHILS # BLD AUTO: 0.02 K/UL — SIGNIFICANT CHANGE UP (ref 0–0.2)
BASOPHILS NFR BLD AUTO: 0.5 % — SIGNIFICANT CHANGE UP (ref 0–2)
BILIRUB DIRECT SERPL-MCNC: <0.1 MG/DL — SIGNIFICANT CHANGE UP (ref 0–0.3)
BILIRUB INDIRECT FLD-MCNC: >0.1 MG/DL — LOW (ref 0.2–1)
BILIRUB SERPL-MCNC: 0.2 MG/DL — SIGNIFICANT CHANGE UP (ref 0.2–1.2)
BILIRUB SERPL-MCNC: 0.2 MG/DL — SIGNIFICANT CHANGE UP (ref 0.2–1.2)
BUN SERPL-MCNC: 43 MG/DL — HIGH (ref 7–23)
CALCIUM SERPL-MCNC: 8.1 MG/DL — LOW (ref 8.4–10.5)
CHLORIDE SERPL-SCNC: 103 MMOL/L — SIGNIFICANT CHANGE UP (ref 96–108)
CO2 SERPL-SCNC: 18 MMOL/L — LOW (ref 22–31)
CREAT SERPL-MCNC: 1.87 MG/DL — HIGH (ref 0.5–1.3)
CREAT SERPL-MCNC: 1.88 MG/DL — HIGH (ref 0.5–1.3)
CULTURE RESULTS: SIGNIFICANT CHANGE UP
CULTURE RESULTS: SIGNIFICANT CHANGE UP
EOSINOPHIL # BLD AUTO: 0.03 K/UL — SIGNIFICANT CHANGE UP (ref 0–0.5)
EOSINOPHIL NFR BLD AUTO: 0.8 % — SIGNIFICANT CHANGE UP (ref 0–6)
GLUCOSE BLDC GLUCOMTR-MCNC: 128 MG/DL — HIGH (ref 70–99)
GLUCOSE BLDC GLUCOMTR-MCNC: 214 MG/DL — HIGH (ref 70–99)
GLUCOSE BLDC GLUCOMTR-MCNC: 219 MG/DL — HIGH (ref 70–99)
GLUCOSE BLDC GLUCOMTR-MCNC: 351 MG/DL — HIGH (ref 70–99)
GLUCOSE SERPL-MCNC: 205 MG/DL — HIGH (ref 70–99)
HCT VFR BLD CALC: 25.6 % — LOW (ref 39–50)
HGB BLD-MCNC: 8.1 G/DL — LOW (ref 13–17)
IMM GRANULOCYTES NFR BLD AUTO: 0.8 % — SIGNIFICANT CHANGE UP (ref 0–1.5)
INR BLD: 1.24 RATIO — HIGH (ref 0.88–1.16)
LYMPHOCYTES # BLD AUTO: 0.74 K/UL — LOW (ref 1–3.3)
LYMPHOCYTES # BLD AUTO: 18.9 % — SIGNIFICANT CHANGE UP (ref 13–44)
MAGNESIUM SERPL-MCNC: 1.8 MG/DL — SIGNIFICANT CHANGE UP (ref 1.6–2.6)
MCHC RBC-ENTMCNC: 26.4 PG — LOW (ref 27–34)
MCHC RBC-ENTMCNC: 31.6 GM/DL — LOW (ref 32–36)
MCV RBC AUTO: 83.4 FL — SIGNIFICANT CHANGE UP (ref 80–100)
MONOCYTES # BLD AUTO: 0.27 K/UL — SIGNIFICANT CHANGE UP (ref 0–0.9)
MONOCYTES NFR BLD AUTO: 6.9 % — SIGNIFICANT CHANGE UP (ref 2–14)
NEUTROPHILS # BLD AUTO: 2.82 K/UL — SIGNIFICANT CHANGE UP (ref 1.8–7.4)
NEUTROPHILS NFR BLD AUTO: 72.1 % — SIGNIFICANT CHANGE UP (ref 43–77)
NRBC # BLD: 0 /100 WBCS — SIGNIFICANT CHANGE UP (ref 0–0)
PHOSPHATE SERPL-MCNC: 2.8 MG/DL — SIGNIFICANT CHANGE UP (ref 2.5–4.5)
PLATELET # BLD AUTO: 185 K/UL — SIGNIFICANT CHANGE UP (ref 150–400)
POTASSIUM SERPL-MCNC: 4.1 MMOL/L — SIGNIFICANT CHANGE UP (ref 3.5–5.3)
POTASSIUM SERPL-SCNC: 4.1 MMOL/L — SIGNIFICANT CHANGE UP (ref 3.5–5.3)
PROT SERPL-MCNC: 5.8 G/DL — LOW (ref 6–8.3)
PROT SERPL-MCNC: 6 G/DL — SIGNIFICANT CHANGE UP (ref 6–8.3)
PROTHROM AB SERPL-ACNC: 14.7 SEC — HIGH (ref 10.6–13.6)
RBC # BLD: 3.07 M/UL — LOW (ref 4.2–5.8)
RBC # FLD: 16.3 % — HIGH (ref 10.3–14.5)
SODIUM SERPL-SCNC: 134 MMOL/L — LOW (ref 135–145)
SPECIMEN SOURCE: SIGNIFICANT CHANGE UP
SPECIMEN SOURCE: SIGNIFICANT CHANGE UP
WBC # BLD: 3.91 K/UL — SIGNIFICANT CHANGE UP (ref 3.8–10.5)
WBC # FLD AUTO: 3.91 K/UL — SIGNIFICANT CHANGE UP (ref 3.8–10.5)

## 2021-12-25 PROCEDURE — 99232 SBSQ HOSP IP/OBS MODERATE 35: CPT

## 2021-12-25 RX ORDER — INSULIN LISPRO 100/ML
1 VIAL (ML) SUBCUTANEOUS
Refills: 0 | Status: DISCONTINUED | OUTPATIENT
Start: 2021-12-25 | End: 2021-12-25

## 2021-12-25 RX ORDER — INSULIN LISPRO 100/ML
2 VIAL (ML) SUBCUTANEOUS
Refills: 0 | Status: DISCONTINUED | OUTPATIENT
Start: 2021-12-25 | End: 2021-12-27

## 2021-12-25 RX ORDER — INSULIN GLARGINE 100 [IU]/ML
4 INJECTION, SOLUTION SUBCUTANEOUS AT BEDTIME
Refills: 0 | Status: DISCONTINUED | OUTPATIENT
Start: 2021-12-25 | End: 2021-12-27

## 2021-12-25 RX ORDER — EVEROLIMUS 10 MG/1
4 TABLET ORAL
Refills: 0 | Status: DISCONTINUED | OUTPATIENT
Start: 2021-12-25 | End: 2022-01-04

## 2021-12-25 RX ADMIN — Medication 500000 UNIT(S): at 00:01

## 2021-12-25 RX ADMIN — PANTOPRAZOLE SODIUM 40 MILLIGRAM(S): 20 TABLET, DELAYED RELEASE ORAL at 18:41

## 2021-12-25 RX ADMIN — HEPARIN SODIUM 5000 UNIT(S): 5000 INJECTION INTRAVENOUS; SUBCUTANEOUS at 13:11

## 2021-12-25 RX ADMIN — PANTOPRAZOLE SODIUM 40 MILLIGRAM(S): 20 TABLET, DELAYED RELEASE ORAL at 05:42

## 2021-12-25 RX ADMIN — Medication 500000 UNIT(S): at 18:42

## 2021-12-25 RX ADMIN — ATORVASTATIN CALCIUM 40 MILLIGRAM(S): 80 TABLET, FILM COATED ORAL at 21:46

## 2021-12-25 RX ADMIN — Medication 5 MILLIGRAM(S): at 05:41

## 2021-12-25 RX ADMIN — Medication 500000 UNIT(S): at 05:41

## 2021-12-25 RX ADMIN — EVEROLIMUS 4 MILLIGRAM(S): 10 TABLET ORAL at 18:43

## 2021-12-25 RX ADMIN — HEPARIN SODIUM 5000 UNIT(S): 5000 INJECTION INTRAVENOUS; SUBCUTANEOUS at 21:46

## 2021-12-25 RX ADMIN — EVEROLIMUS 4 MILLIGRAM(S): 10 TABLET ORAL at 05:42

## 2021-12-25 RX ADMIN — Medication 2: at 08:16

## 2021-12-25 RX ADMIN — CEFTRIAXONE 100 MILLIGRAM(S): 500 INJECTION, POWDER, FOR SOLUTION INTRAMUSCULAR; INTRAVENOUS at 18:47

## 2021-12-25 RX ADMIN — INSULIN GLARGINE 4 UNIT(S): 100 INJECTION, SOLUTION SUBCUTANEOUS at 21:45

## 2021-12-25 RX ADMIN — Medication 5: at 17:02

## 2021-12-25 RX ADMIN — Medication 1 TABLET(S): at 12:01

## 2021-12-25 RX ADMIN — Medication 500000 UNIT(S): at 12:01

## 2021-12-25 RX ADMIN — HEPARIN SODIUM 5000 UNIT(S): 5000 INJECTION INTRAVENOUS; SUBCUTANEOUS at 05:41

## 2021-12-25 RX ADMIN — VALGANCICLOVIR 450 MILLIGRAM(S): 450 TABLET, FILM COATED ORAL at 12:02

## 2021-12-25 RX ADMIN — Medication 2: at 11:58

## 2021-12-25 RX ADMIN — Medication 2 UNIT(S): at 17:01

## 2021-12-25 RX ADMIN — Medication 500000 UNIT(S): at 23:07

## 2021-12-25 NOTE — PROGRESS NOTE ADULT - PROBLEM SELECTOR PLAN 2
Cough and sore throat for the past few days. RVP positive for covid at this visit  - Currently in PICU (Covid unit)  - satting well on room air  - cough drops PRN  - on remdesivir (12/20 - 12/25 )  - s/p monoclonal antibody infusion (12/20)  - [facility] Cough and sore throat for the past few days. RVP positive for covid at this visit  - Currently in PICU (Covid unit)  - satting well on room air  - cough drops PRN  - s/p remdesivir (12/20 - 12/25 )  - s/p monoclonal antibody infusion (12/20)  - CT

## 2021-12-25 NOTE — PROGRESS NOTE ADULT - PROBLEM SELECTOR PLAN 11
DVT: hep subq given hypercoaguable state secondary to covid  Diet: consistent carb, DASH  Dispo: Home 20 rosuvastatin, aspiring 81 for preventative medicine  - atorvastatin 80  - aspirin 81 held given possible GI bleed Home insulin regimen 12 lantus and 10 humalog premeal  - Will hold insulin given glucose has been running low  - SSI  - CTM

## 2021-12-25 NOTE — PROGRESS NOTE ADULT - SUBJECTIVE AND OBJECTIVE BOX
Internal Medicine   Jose Alaniz | PGY-1     OVERNIGHT EVENTS:      SUBJECTIVE:       MEDICATIONS  (STANDING):  atorvastatin 40 milliGRAM(s) Oral at bedtime  cefTRIAXone   IVPB 1000 milliGRAM(s) IV Intermittent every 24 hours  dextrose 40% Gel 15 Gram(s) Oral once  dextrose 5%. 1000 milliLiter(s) (50 mL/Hr) IV Continuous <Continuous>  dextrose 5%. 1000 milliLiter(s) (100 mL/Hr) IV Continuous <Continuous>  dextrose 50% Injectable 25 Gram(s) IV Push once  dextrose 50% Injectable 12.5 Gram(s) IV Push once  dextrose 50% Injectable 25 Gram(s) IV Push once  everolimus (ZORTRESS) 4 milliGRAM(s) Oral two times a day  glucagon  Injectable 1 milliGRAM(s) IntraMuscular once  heparin   Injectable 5000 Unit(s) SubCutaneous every 8 hours  insulin lispro (ADMELOG) corrective regimen sliding scale   SubCutaneous three times a day before meals  insulin lispro (ADMELOG) corrective regimen sliding scale   SubCutaneous at bedtime  nystatin    Suspension [id number] Unit(s) Oral four times a day  pantoprazole    Tablet 40 milliGRAM(s) Oral two times a day  predniSONE   Tablet 5 milliGRAM(s) Oral daily  trimethoprim   80 mG/sulfamethoxazole 400 mG 1 Tablet(s) Oral daily  valGANciclovir 450 milliGRAM(s) Oral daily    MEDICATIONS  (PRN):  benzonatate 100 milliGRAM(s) Oral every 8 hours PRN Cough        T(F): 98.7 (12-25-21 @ 05:11), Max: 98.7 (12-25-21 @ 05:11)  HR: 81 (12-25-21 @ 05:42) (72 - 86)  BP: 126/60 (12-25-21 @ 05:11) (117/68 - 140/67)  BP(mean): --  RR: 18 (12-25-21 @ 05:11) (18 - 19)  SpO2: 94% (12-25-21 @ 05:42) (90% - 98%)    PHYSICAL EXAM:     GENERAL: NAD, lying in bed comfortably  HEAD:  Atraumatic, Normocephalic  EYES: EOMI, PERRLA, conjunctiva and sclera clear, no nystagmus noted  ENT: Moist mucous membranes,   NECK: Supple, No JVD, trachea midline  CHEST/LUNG: Clear to auscultation bilaterally; No rales, rhonchi, wheezing, or rubs. Unlabored respirations  HEART: Regular rate and rhythm; No murmurs, rubs, or gallops, normal S1/S2  ABDOMEN: normal bowel sounds; Soft, nontender, nondistended, no organomegaly   EXTREMITIES:  2+ Peripheral Pulses, brisk capillary refill. No clubbing, cyanosis, or edema  MSK: No gross deformities noted   Neurological:  A&Ox3, no focal deficits   SKIN: No rashes or lesions  PSYCH: Normal mood, affect     TELEMETRY:    LABS:                        8.1    3.91  )-----------( 185      ( 25 Dec 2021 06:54 )             25.6     12-24    135  |  104  |  40<H>  ----------------------------<  188<H>  4.3   |  19<L>  |  1.84<H>    Ca    8.6      24 Dec 2021 12:31  Phos  3.0     12-24  Mg     1.8     12-24    TPro  6.4  /  Alb  2.9<L>  /  TBili  0.2  /  DBili  <0.1  /  AST  26  /  ALT  35  /  AlkPhos  176<H>  12-24        PT/INR - ( 24 Dec 2021 07:00 )   PT: 14.2 sec;   INR: 1.19 ratio             Creatinine Trend: 1.84<--, 2.02<--, 2.00<--, 1.98<--, 1.96<--, 1.99<--  I&O's Summary    24 Dec 2021 07:01  -  25 Dec 2021 07:00  --------------------------------------------------------  IN: 360 mL / OUT: 800 mL / NET: -440 mL      BNP    RADIOLOGY & ADDITIONAL STUDIES:             Internal Medicine   Jose Alaniz | PGY-1     OVERNIGHT EVENTS: No overnight events. Tele: Sinus 80-90      SUBJECTIVE: Patient was seen and examined at bedside this morning. Says cough and sore throat is improving. Denies any nausea/vomiting/diarrhea, headache, shortness of breath, abdominal pain or chest pain/palpitations. Patient responding appropriately to questions and able to make needs known. Vital signs/imaging/telemetry events reviewed.       MEDICATIONS  (STANDING):  atorvastatin 40 milliGRAM(s) Oral at bedtime  cefTRIAXone   IVPB 1000 milliGRAM(s) IV Intermittent every 24 hours  dextrose 40% Gel 15 Gram(s) Oral once  dextrose 5%. 1000 milliLiter(s) (50 mL/Hr) IV Continuous <Continuous>  dextrose 5%. 1000 milliLiter(s) (100 mL/Hr) IV Continuous <Continuous>  dextrose 50% Injectable 25 Gram(s) IV Push once  dextrose 50% Injectable 12.5 Gram(s) IV Push once  dextrose 50% Injectable 25 Gram(s) IV Push once  everolimus (ZORTRESS) 4 milliGRAM(s) Oral two times a day  glucagon  Injectable 1 milliGRAM(s) IntraMuscular once  heparin   Injectable 5000 Unit(s) SubCutaneous every 8 hours  insulin lispro (ADMELOG) corrective regimen sliding scale   SubCutaneous three times a day before meals  insulin lispro (ADMELOG) corrective regimen sliding scale   SubCutaneous at bedtime  nystatin    Suspension 286408 Unit(s) Oral four times a day  pantoprazole    Tablet 40 milliGRAM(s) Oral two times a day  predniSONE   Tablet 5 milliGRAM(s) Oral daily  trimethoprim   80 mG/sulfamethoxazole 400 mG 1 Tablet(s) Oral daily  valGANciclovir 450 milliGRAM(s) Oral daily    MEDICATIONS  (PRN):  benzonatate 100 milliGRAM(s) Oral every 8 hours PRN Cough        T(F): 98.7 (12-25-21 @ 05:11), Max: 98.7 (12-25-21 @ 05:11)  HR: 81 (12-25-21 @ 05:42) (72 - 86)  BP: 126/60 (12-25-21 @ 05:11) (117/68 - 140/67)  BP(mean): --  RR: 18 (12-25-21 @ 05:11) (18 - 19)  SpO2: 94% (12-25-21 @ 05:42) (90% - 98%)    PHYSICAL EXAM:     GENERAL: NAD, lying in bed comfortably  HEAD:  Atraumatic, Normocephalic  EYES: EOMI, PERRLA, conjunctiva and sclera clear, no nystagmus noted  ENT: Moist mucous membranes,   NECK: Supple, No JVD, trachea midline  CHEST/LUNG: Clear to auscultation bilaterally; No rales, rhonchi, wheezing, or rubs. Unlabored respirations  HEART: Regular rate and rhythm; No murmurs, rubs, or gallops, normal S1/S2  ABDOMEN: normal bowel sounds; Soft, nontender, nondistended, no organomegaly   EXTREMITIES:  2+ Peripheral Pulses, brisk capillary refill. No clubbing, cyanosis, or edema  MSK: No gross deformities noted   Neurological:  A&Ox3, no focal deficits   SKIN: No rashes or lesions  PSYCH: Normal mood, affect     TELEMETRY:    LABS:                        8.1    3.91  )-----------( 185      ( 25 Dec 2021 06:54 )             25.6     12-24    135  |  104  |  40<H>  ----------------------------<  188<H>  4.3   |  19<L>  |  1.84<H>    Ca    8.6      24 Dec 2021 12:31  Phos  3.0     12-24  Mg     1.8     12-24    TPro  6.4  /  Alb  2.9<L>  /  TBili  0.2  /  DBili  <0.1  /  AST  26  /  ALT  35  /  AlkPhos  176<H>  12-24        PT/INR - ( 24 Dec 2021 07:00 )   PT: 14.2 sec;   INR: 1.19 ratio             Creatinine Trend: 1.84<--, 2.02<--, 2.00<--, 1.98<--, 1.96<--, 1.99<--  I&O's Summary    24 Dec 2021 07:01  -  25 Dec 2021 07:00  --------------------------------------------------------  IN: 360 mL / OUT: 800 mL / NET: -440 mL      BNP    RADIOLOGY & ADDITIONAL STUDIES:

## 2021-12-25 NOTE — PROGRESS NOTE ADULT - PROBLEM SELECTOR PLAN 9
Home insulin regimen 12 lantus and 10 humalog premeal  - Will hold insulin given glucose has been running low  - SSI  - CTM On home APAP per sleep note on [employer]  - Will allow patient to use CPAP given he is desatting when sleeping per nurse.

## 2021-12-25 NOTE — PROGRESS NOTE ADULT - PROBLEM SELECTOR PLAN 12
DVT: hep subq given hypercoaguable state secondary to covid  Diet: consistent carb, DASH  Dispo: Home 20 rosuvastatin, aspiring 81 for preventative medicine  - atorvastatin 80  - aspirin 81 held given possible GI bleed

## 2021-12-25 NOTE — PROGRESS NOTE ADULT - PROBLEM SELECTOR PLAN 10
Home 20 rosuvastatin, aspiring 81 for preventative medicine  - atorvastatin 80  - aspirin 81 held given possible GI bleed Home insulin regimen 12 lantus and 10 humalog premeal  - Will hold insulin given glucose has been running low  - SSI  - CTM Baseline creatinine at 1.8-1.9.   - CTM

## 2021-12-25 NOTE — PROGRESS NOTE ADULT - PROBLEM SELECTOR PLAN 8
On home APAP per sleep note on [employer]  - Will allow patient to use CPAP given he is desatting when sleeping per nurse. - home zoloft held in setting of GI bleed  - Patient is amenable to speaking to inpatient psych. Will consult psych

## 2021-12-25 NOTE — PROGRESS NOTE ADULT - ATTENDING COMMENTS
Patient is a 65 y/o M PMHx DDLT (07/2020, c/b post-op VRE bacteremia), prior ESBL e.coli prostate abscess, R heel OM (12/2020), C. Diff (12/2020), DM, MGUS, and HFpEF, decompensated JEAN cirrhosis status post liver transplant 2020 for [facility] presents status post unwitnessed fall, found to be COVID 19 + s/p remdesivir x5 days. Currently on RA, saturating 98%. Of note, given immunocompromised state, patient must isolte for 21 days. On CXR, noted to have infitlrations, s/p 5 days of ceftriaxone (last day today). Regarding liver transplant, c/w everlomus and prednisone daily. Concern that everolimus may be causing non infectious pneumonitis, per Transplant Hepatology, recommend continuing everolimus and f/u CT in 1-2 months. Regarding his T2Dm, patient's home regimen is lantus 12 U qhs and 10 U humalog premeal. His course was c/b hypoglycemia to 60s and so insulin was held (likely 2/2 dfecreased po intake). Patient's FS are now elevated, reintroduced insulin w/ lower dosing-lantus 4 U, premeal 2 U TID based on sliding scale requirements.     Dispo: PT recommended [facility]. Have facility that can accept him on 12/30, patient is amenable to staying until then.

## 2021-12-26 LAB
ALBUMIN SERPL ELPH-MCNC: 2.8 G/DL — LOW (ref 3.3–5)
ALP SERPL-CCNC: 187 U/L — HIGH (ref 40–120)
ALT FLD-CCNC: 27 U/L — SIGNIFICANT CHANGE UP (ref 10–45)
ANION GAP SERPL CALC-SCNC: 12 MMOL/L — SIGNIFICANT CHANGE UP (ref 5–17)
AST SERPL-CCNC: 22 U/L — SIGNIFICANT CHANGE UP (ref 10–40)
BILIRUB SERPL-MCNC: 0.3 MG/DL — SIGNIFICANT CHANGE UP (ref 0.2–1.2)
BUN SERPL-MCNC: 41 MG/DL — HIGH (ref 7–23)
CALCIUM SERPL-MCNC: 8.6 MG/DL — SIGNIFICANT CHANGE UP (ref 8.4–10.5)
CHLORIDE SERPL-SCNC: 102 MMOL/L — SIGNIFICANT CHANGE UP (ref 96–108)
CO2 SERPL-SCNC: 19 MMOL/L — LOW (ref 22–31)
CREAT SERPL-MCNC: 1.85 MG/DL — HIGH (ref 0.5–1.3)
CRP SERPL-MCNC: 156 MG/L — HIGH (ref 0–4)
D DIMER BLD IA.RAPID-MCNC: 632 NG/ML DDU — HIGH
EVEROLIMUS, WHOLE BLOOD RESULT: 3.5 NG/ML — SIGNIFICANT CHANGE UP (ref 3–8)
GLUCOSE BLDC GLUCOMTR-MCNC: 128 MG/DL — HIGH (ref 70–99)
GLUCOSE BLDC GLUCOMTR-MCNC: 162 MG/DL — HIGH (ref 70–99)
GLUCOSE BLDC GLUCOMTR-MCNC: 214 MG/DL — HIGH (ref 70–99)
GLUCOSE BLDC GLUCOMTR-MCNC: 218 MG/DL — HIGH (ref 70–99)
GLUCOSE SERPL-MCNC: 106 MG/DL — HIGH (ref 70–99)
HCT VFR BLD CALC: 25.2 % — LOW (ref 39–50)
HGB BLD-MCNC: 7.9 G/DL — LOW (ref 13–17)
INR BLD: 1.22 RATIO — HIGH (ref 0.88–1.16)
LDH SERPL L TO P-CCNC: 260 U/L — HIGH (ref 50–242)
MAGNESIUM SERPL-MCNC: 1.8 MG/DL — SIGNIFICANT CHANGE UP (ref 1.6–2.6)
MCHC RBC-ENTMCNC: 26.3 PG — LOW (ref 27–34)
MCHC RBC-ENTMCNC: 31.3 GM/DL — LOW (ref 32–36)
MCV RBC AUTO: 84 FL — SIGNIFICANT CHANGE UP (ref 80–100)
NRBC # BLD: 0 /100 WBCS — SIGNIFICANT CHANGE UP (ref 0–0)
PHOSPHATE SERPL-MCNC: 2.3 MG/DL — LOW (ref 2.5–4.5)
PLATELET # BLD AUTO: 195 K/UL — SIGNIFICANT CHANGE UP (ref 150–400)
POTASSIUM SERPL-MCNC: 4.1 MMOL/L — SIGNIFICANT CHANGE UP (ref 3.5–5.3)
POTASSIUM SERPL-SCNC: 4.1 MMOL/L — SIGNIFICANT CHANGE UP (ref 3.5–5.3)
PROT SERPL-MCNC: 6.1 G/DL — SIGNIFICANT CHANGE UP (ref 6–8.3)
PROTHROM AB SERPL-ACNC: 14.5 SEC — HIGH (ref 10.6–13.6)
RBC # BLD: 3 M/UL — LOW (ref 4.2–5.8)
RBC # FLD: 16.1 % — HIGH (ref 10.3–14.5)
SODIUM SERPL-SCNC: 133 MMOL/L — LOW (ref 135–145)
WBC # BLD: 3.94 K/UL — SIGNIFICANT CHANGE UP (ref 3.8–10.5)
WBC # FLD AUTO: 3.94 K/UL — SIGNIFICANT CHANGE UP (ref 3.8–10.5)

## 2021-12-26 PROCEDURE — 99232 SBSQ HOSP IP/OBS MODERATE 35: CPT

## 2021-12-26 RX ORDER — SODIUM,POTASSIUM PHOSPHATES 278-250MG
2 POWDER IN PACKET (EA) ORAL ONCE
Refills: 0 | Status: COMPLETED | OUTPATIENT
Start: 2021-12-26 | End: 2021-12-26

## 2021-12-26 RX ORDER — BENZOCAINE AND MENTHOL 5; 1 G/100ML; G/100ML
1 LIQUID ORAL EVERY 6 HOURS
Refills: 0 | Status: DISCONTINUED | OUTPATIENT
Start: 2021-12-26 | End: 2022-01-06

## 2021-12-26 RX ADMIN — Medication 2: at 17:39

## 2021-12-26 RX ADMIN — PANTOPRAZOLE SODIUM 40 MILLIGRAM(S): 20 TABLET, DELAYED RELEASE ORAL at 17:34

## 2021-12-26 RX ADMIN — HEPARIN SODIUM 5000 UNIT(S): 5000 INJECTION INTRAVENOUS; SUBCUTANEOUS at 14:44

## 2021-12-26 RX ADMIN — PANTOPRAZOLE SODIUM 40 MILLIGRAM(S): 20 TABLET, DELAYED RELEASE ORAL at 06:38

## 2021-12-26 RX ADMIN — Medication 500000 UNIT(S): at 12:21

## 2021-12-26 RX ADMIN — INSULIN GLARGINE 4 UNIT(S): 100 INJECTION, SOLUTION SUBCUTANEOUS at 22:26

## 2021-12-26 RX ADMIN — ATORVASTATIN CALCIUM 40 MILLIGRAM(S): 80 TABLET, FILM COATED ORAL at 22:25

## 2021-12-26 RX ADMIN — Medication 100 MILLIGRAM(S): at 06:38

## 2021-12-26 RX ADMIN — Medication 5 MILLIGRAM(S): at 06:38

## 2021-12-26 RX ADMIN — Medication 2 PACKET(S): at 12:20

## 2021-12-26 RX ADMIN — Medication 1: at 12:19

## 2021-12-26 RX ADMIN — Medication 500000 UNIT(S): at 17:34

## 2021-12-26 RX ADMIN — Medication 500000 UNIT(S): at 22:25

## 2021-12-26 RX ADMIN — Medication 2 UNIT(S): at 12:19

## 2021-12-26 RX ADMIN — EVEROLIMUS 4 MILLIGRAM(S): 10 TABLET ORAL at 17:51

## 2021-12-26 RX ADMIN — Medication 500000 UNIT(S): at 06:37

## 2021-12-26 RX ADMIN — Medication 2 UNIT(S): at 17:37

## 2021-12-26 RX ADMIN — HEPARIN SODIUM 5000 UNIT(S): 5000 INJECTION INTRAVENOUS; SUBCUTANEOUS at 06:38

## 2021-12-26 RX ADMIN — EVEROLIMUS 4 MILLIGRAM(S): 10 TABLET ORAL at 06:37

## 2021-12-26 RX ADMIN — Medication 1 TABLET(S): at 12:21

## 2021-12-26 RX ADMIN — HEPARIN SODIUM 5000 UNIT(S): 5000 INJECTION INTRAVENOUS; SUBCUTANEOUS at 22:25

## 2021-12-26 RX ADMIN — Medication 2 UNIT(S): at 08:42

## 2021-12-26 RX ADMIN — VALGANCICLOVIR 450 MILLIGRAM(S): 450 TABLET, FILM COATED ORAL at 12:21

## 2021-12-26 NOTE — PROGRESS NOTE ADULT - PROBLEM SELECTOR PLAN 11
Home insulin regimen 12 lantus and 10 humalog premeal. A1c 10.1.  -now on lantus 4u and premeal 2u TID, caution in s/o hypoglycemia  - SSI  - CTM

## 2021-12-26 NOTE — PROVIDER CONTACT NOTE (MEDICATION) - ACTION/TREATMENT ORDERED:
MD made aware, okay to administer medication without level, level to be drawn tomorrow morning based on nephro recommendation.

## 2021-12-26 NOTE — PROGRESS NOTE ADULT - PROBLEM SELECTOR PLAN 2
Cough and sore throat for the past few days. RVP positive for covid at this visit  - Currently in PICU (Covid unit)  - satting well on room air  - cough drops PRN  - s/p remdesivir (12/20 - 12/25 )  - s/p monoclonal antibody infusion (12/20)  - CT

## 2021-12-26 NOTE — PROGRESS NOTE ADULT - SUBJECTIVE AND OBJECTIVE BOX
PROGRESS NOTE:   Authored by Dayna Navarrete MD  Pager: [facility] [phone]; [facility] [phone]    Patient is a 66y old  Male who presents with a chief complaint of Fall (25 Dec 2021 07:17)      SUBJECTIVE / OVERNIGHT EVENTS:  NAEON. Refused CPAP overnight. This AM, denies CP, SOB, subjective f/c, n/v.  All other review of systems is negative unless indicated above.    MEDICATIONS  (STANDING):  atorvastatin 40 milliGRAM(s) Oral at bedtime  dextrose 40% Gel 15 Gram(s) Oral once  dextrose 5%. 1000 milliLiter(s) (50 mL/Hr) IV Continuous <Continuous>  dextrose 5%. 1000 milliLiter(s) (100 mL/Hr) IV Continuous <Continuous>  dextrose 50% Injectable 25 Gram(s) IV Push once  dextrose 50% Injectable 12.5 Gram(s) IV Push once  dextrose 50% Injectable 25 Gram(s) IV Push once  everolimus (ZORTRESS) 4 milliGRAM(s) Oral two times a day  glucagon  Injectable 1 milliGRAM(s) IntraMuscular once  heparin   Injectable 5000 Unit(s) SubCutaneous every 8 hours  insulin glargine Injectable (LANTUS) 4 Unit(s) SubCutaneous at bedtime  insulin lispro (ADMELOG) corrective regimen sliding scale   SubCutaneous three times a day before meals  insulin lispro (ADMELOG) corrective regimen sliding scale   SubCutaneous at bedtime  insulin lispro Injectable (ADMELOG) 2 Unit(s) SubCutaneous three times a day before meals  nystatin    Suspension 079645 Unit(s) Oral four times a day  pantoprazole    Tablet 40 milliGRAM(s) Oral two times a day  predniSONE   Tablet 5 milliGRAM(s) Oral daily  trimethoprim   80 mG/sulfamethoxazole 400 mG 1 Tablet(s) Oral daily  valGANciclovir 450 milliGRAM(s) Oral daily    MEDICATIONS  (PRN):  benzonatate 100 milliGRAM(s) Oral every 8 hours PRN Cough      CAPILLARY BLOOD GLUCOSE      POCT Blood Glucose.: 128 mg/dL (25 Dec 2021 21:22)  POCT Blood Glucose.: 351 mg/dL (25 Dec 2021 16:14)  POCT Blood Glucose.: 214 mg/dL (25 Dec 2021 11:51)  POCT Blood Glucose.: 219 mg/dL (25 Dec 2021 08:12)    I&O's Summary    24 Dec 2021 07:01  -  25 Dec 2021 07:00  --------------------------------------------------------  IN: 360 mL / OUT: 800 mL / NET: -440 mL    25 Dec 2021 07:01  -  26 Dec 2021 06:22  --------------------------------------------------------  IN: 0 mL / OUT: 2050 mL / NET: -2050 mL        PHYSICAL EXAM:  Vital Signs Last 24 Hrs  T(C): 36.6 (26 Dec 2021 05:03), Max: 37.4 (25 Dec 2021 21:58)  T(F): 97.9 (26 Dec 2021 05:03), Max: 99.3 (25 Dec 2021 21:58)  HR: 83 (26 Dec 2021 05:03) (77 - 98)  BP: 120/70 (26 Dec 2021 05:03) (95/66 - 121/66)  BP(mean): --  RR: 20 (26 Dec 2021 05:03) (18 - 20)  SpO2: 96% (26 Dec 2021 05:03) (94% - 98%)    GENERAL: NAD, lying in bed comfortably  HEAD:  Atraumatic, Normocephalic  EYES: EOMI, PERRLA, conjunctiva and sclera clear, no nystagmus noted  ENT: Moist mucous membranes,   NECK: Supple, No JVD, trachea midline  CHEST/LUNG: Clear to auscultation bilaterally; No rales, rhonchi, wheezing, or rubs. Unlabored respirations  HEART: Regular rate and rhythm; No murmurs, rubs, or gallops, normal S1/S2  ABDOMEN: normal bowel sounds; Soft, nontender, nondistended, no organomegaly   EXTREMITIES:  2+ Peripheral Pulses, brisk capillary refill. No clubbing, cyanosis, or edema  MSK: No gross deformities noted   Neurological:  A&Ox3, no focal deficits   SKIN: No rashes or lesions  PSYCH: Normal mood, affect     LABS:                        8.1    3.91  )-----------( 185      ( 25 Dec 2021 06:54 )             25.6     12-25    134<L>  |  103  |  43<H>  ----------------------------<  205<H>  4.1   |  18<L>  |  1.88<H>    Ca    8.1<L>      25 Dec 2021 06:54  Phos  2.8     12-25  Mg     1.8     12-25    TPro  5.8<L>  /  Alb  2.6<L>  /  TBili  0.2  /  DBili  <0.1  /  AST  24  /  ALT  32  /  AlkPhos  181<H>  12-25    PT/INR - ( 25 Dec 2021 06:54 )   PT: 14.7 sec;   INR: 1.24 ratio                     RADIOLOGY & ADDITIONAL TESTS:  Results Reviewed:   Imaging Personally Reviewed:  Electrocardiogram Personally Reviewed:    COORDINATION OF CARE:  Care Discussed with Consultants/Other Providers [Y/N]:  Prior or Outpatient Records Reviewed [Y/N]:   PROGRESS NOTE:   Authored by Dayna Navarrete MD  Pager: [facility] [phone]; [facility] [phone]    Patient is a 66y old  Male who presents with a chief complaint of Fall (25 Dec 2021 07:17)      SUBJECTIVE / OVERNIGHT EVENTS:  NAEON. Refused CPAP overnight. Currently on 2LNC.  This AM, denies CP, SOB, subjective f/c, n/v.  All other review of systems is negative unless indicated above.    MEDICATIONS  (STANDING):  atorvastatin 40 milliGRAM(s) Oral at bedtime  dextrose 40% Gel 15 Gram(s) Oral once  dextrose 5%. 1000 milliLiter(s) (50 mL/Hr) IV Continuous <Continuous>  dextrose 5%. 1000 milliLiter(s) (100 mL/Hr) IV Continuous <Continuous>  dextrose 50% Injectable 25 Gram(s) IV Push once  dextrose 50% Injectable 12.5 Gram(s) IV Push once  dextrose 50% Injectable 25 Gram(s) IV Push once  everolimus (ZORTRESS) 4 milliGRAM(s) Oral two times a day  glucagon  Injectable 1 milliGRAM(s) IntraMuscular once  heparin   Injectable 5000 Unit(s) SubCutaneous every 8 hours  insulin glargine Injectable (LANTUS) 4 Unit(s) SubCutaneous at bedtime  insulin lispro (ADMELOG) corrective regimen sliding scale   SubCutaneous three times a day before meals  insulin lispro (ADMELOG) corrective regimen sliding scale   SubCutaneous at bedtime  insulin lispro Injectable (ADMELOG) 2 Unit(s) SubCutaneous three times a day before meals  nystatin    Suspension [id number] Unit(s) Oral four times a day  pantoprazole    Tablet 40 milliGRAM(s) Oral two times a day  predniSONE   Tablet 5 milliGRAM(s) Oral daily  trimethoprim   80 mG/sulfamethoxazole 400 mG 1 Tablet(s) Oral daily  valGANciclovir 450 milliGRAM(s) Oral daily    MEDICATIONS  (PRN):  benzonatate 100 milliGRAM(s) Oral every 8 hours PRN Cough      CAPILLARY BLOOD GLUCOSE      POCT Blood Glucose.: 128 mg/dL (25 Dec 2021 21:22)  POCT Blood Glucose.: 351 mg/dL (25 Dec 2021 16:14)  POCT Blood Glucose.: 214 mg/dL (25 Dec 2021 11:51)  POCT Blood Glucose.: 219 mg/dL (25 Dec 2021 08:12)    I&O's Summary    24 Dec 2021 07:01  -  25 Dec 2021 07:00  --------------------------------------------------------  IN: 360 mL / OUT: 800 mL / NET: -440 mL    25 Dec 2021 07:01  -  26 Dec 2021 06:22  --------------------------------------------------------  IN: 0 mL / OUT: 2050 mL / NET: -2050 mL        PHYSICAL EXAM:  Vital Signs Last 24 Hrs  T(C): 36.6 (26 Dec 2021 05:03), Max: 37.4 (25 Dec 2021 21:58)  T(F): 97.9 (26 Dec 2021 05:03), Max: 99.3 (25 Dec 2021 21:58)  HR: 83 (26 Dec 2021 05:03) (77 - 98)  BP: 120/70 (26 Dec 2021 05:03) (95/66 - 121/66)  BP(mean): --  RR: 20 (26 Dec 2021 05:03) (18 - 20)  SpO2: 96% (26 Dec 2021 05:03) (94% - 98%)    GENERAL: NAD, lying in bed comfortably  HEAD:  Atraumatic, Normocephalic  EYES: EOMI, PERRLA, conjunctiva and sclera clear, no nystagmus noted  ENT: Moist mucous membranes,   NECK: Supple, No JVD, trachea midline  CHEST/LUNG: Clear to auscultation bilaterally; No rales, rhonchi, wheezing, or rubs. Unlabored respirations  HEART: Regular rate and rhythm; No murmurs, rubs, or gallops, normal S1/S2  ABDOMEN: normal bowel sounds; Soft, nontender, nondistended, no organomegaly   EXTREMITIES:  2+ Peripheral Pulses, brisk capillary refill. No clubbing, cyanosis, or edema  MSK: No gross deformities noted   Neurological:  A&Ox3, no focal deficits   SKIN: No rashes or lesions  PSYCH: Normal mood, affect     LABS:                        8.1    3.91  )-----------( 185      ( 25 Dec 2021 06:54 )             25.6     12-25    134<L>  |  103  |  43<H>  ----------------------------<  205<H>  4.1   |  18<L>  |  1.88<H>    Ca    8.1<L>      25 Dec 2021 06:54  Phos  2.8     12-25  Mg     1.8     12-25    TPro  5.8<L>  /  Alb  2.6<L>  /  TBili  0.2  /  DBili  <0.1  /  AST  24  /  ALT  32  /  AlkPhos  181<H>  12-25    PT/INR - ( 25 Dec 2021 06:54 )   PT: 14.7 sec;   INR: 1.24 ratio                     RADIOLOGY & ADDITIONAL TESTS:  Results Reviewed:   Imaging Personally Reviewed:  Electrocardiogram Personally Reviewed:    COORDINATION OF CARE:  Care Discussed with Consultants/Other Providers [Y/N]:  Prior or Outpatient Records Reviewed [Y/N]:

## 2021-12-26 NOTE — PROGRESS NOTE ADULT - ATTENDING COMMENTS
Mr. Segundo is a 67 y/o M PMHx DDLT (07/2020, c/b post-op VRE bacteremia), prior ESBL e.coli prostate abscess, R heel OM (12/2020), C. Diff (12/2020), DM, MGUS, and HFpEF, decompensated JEAN cirrhosis status post liver transplant 2020 (on everolimus and prednisone) presents status post unwitnessed fall, found to be COVID 19 + s/p remdesivir x5 days. Given immunocompromised status will need to quarantine for 21 days. s/p 5 days of Ceftriaxone for pulmonary infiltrations. Noted to have hypoglycemia earlier on in hospitalization currently fingersticks mostly < 180 on 4U Lantus and 2U TID of admelog can slowly uptitrate as needed (home regimen 12U Lantus 10U Humalog TID). PT recommending SANTANA d/c plan for 12/30.

## 2021-12-26 NOTE — PROGRESS NOTE ADULT - PROBLEM SELECTOR PLAN 1
Unclear etiology of the fall. Patient denies loss of consciousness. Low suspicion of a cardiogenic cause with unremarkable trops and EKG. Denies mechanical causes. Patient said that he was "out of it" when he fell but did not lose consciousness. He also said that he urinated on the floor but claims this was intentional. Neurologic cause?   - Neurology following  - Orthostatic blood pressures were positive for orthostatic hypotension  - TTE unremarkable  - Telemetry  - Infectious work up: Sputum culture, urine culture, blood cultures so far NGTD  - Pt consul-> rehab Unclear etiology of the fall. Patient denies loss of consciousness. Low suspicion of a cardiogenic cause with unremarkable trops and EKG. Denies mechanical causes. Patient said that he was "out of it" when he fell but did not lose consciousness. He also said that he urinated on the floor but claims this was intentional. Neurologic cause?   - Neurology following  - Orthostatic blood pressures were positive for orthostatic hypotension  - TTE unremarkable  - c/w telemetry  - Infectious work up: Sputum culture, urine culture, blood cultures so far NGTD  - Pt consul-> rehab

## 2021-12-26 NOTE — PROGRESS NOTE ADULT - PROBLEM SELECTOR PLAN 8
- home zoloft held in setting of GI bleed  - Patient is amenable to speaking to inpatient psych. Will consult psych

## 2021-12-27 LAB
ALBUMIN SERPL ELPH-MCNC: 2.7 G/DL — LOW (ref 3.3–5)
ALP SERPL-CCNC: 201 U/L — HIGH (ref 40–120)
ALT FLD-CCNC: 31 U/L — SIGNIFICANT CHANGE UP (ref 10–45)
ANION GAP SERPL CALC-SCNC: 10 MMOL/L — SIGNIFICANT CHANGE UP (ref 5–17)
ANISOCYTOSIS BLD QL: SLIGHT — SIGNIFICANT CHANGE UP
AST SERPL-CCNC: 31 U/L — SIGNIFICANT CHANGE UP (ref 10–40)
BASOPHILS # BLD AUTO: 0.08 K/UL — SIGNIFICANT CHANGE UP (ref 0–0.2)
BASOPHILS NFR BLD AUTO: 2.7 % — HIGH (ref 0–2)
BILIRUB SERPL-MCNC: 0.3 MG/DL — SIGNIFICANT CHANGE UP (ref 0.2–1.2)
BUN SERPL-MCNC: 36 MG/DL — HIGH (ref 7–23)
CALCIUM SERPL-MCNC: 8.3 MG/DL — LOW (ref 8.4–10.5)
CHLORIDE SERPL-SCNC: 104 MMOL/L — SIGNIFICANT CHANGE UP (ref 96–108)
CO2 SERPL-SCNC: 21 MMOL/L — LOW (ref 22–31)
CREAT SERPL-MCNC: 1.78 MG/DL — HIGH (ref 0.5–1.3)
DACRYOCYTES BLD QL SMEAR: SLIGHT — SIGNIFICANT CHANGE UP
ELLIPTOCYTES BLD QL SMEAR: SIGNIFICANT CHANGE UP
EOSINOPHIL # BLD AUTO: 0.11 K/UL — SIGNIFICANT CHANGE UP (ref 0–0.5)
EOSINOPHIL NFR BLD AUTO: 3.6 % — SIGNIFICANT CHANGE UP (ref 0–6)
GLUCOSE BLDC GLUCOMTR-MCNC: 167 MG/DL — HIGH (ref 70–99)
GLUCOSE BLDC GLUCOMTR-MCNC: 197 MG/DL — HIGH (ref 70–99)
GLUCOSE BLDC GLUCOMTR-MCNC: 248 MG/DL — HIGH (ref 70–99)
GLUCOSE BLDC GLUCOMTR-MCNC: 337 MG/DL — HIGH (ref 70–99)
GLUCOSE SERPL-MCNC: 140 MG/DL — HIGH (ref 70–99)
HCT VFR BLD CALC: 24.6 % — LOW (ref 39–50)
HGB BLD-MCNC: 7.6 G/DL — LOW (ref 13–17)
INR BLD: 1.27 RATIO — HIGH (ref 0.88–1.16)
LYMPHOCYTES # BLD AUTO: 0.45 K/UL — LOW (ref 1–3.3)
LYMPHOCYTES # BLD AUTO: 15.2 % — SIGNIFICANT CHANGE UP (ref 13–44)
MAGNESIUM SERPL-MCNC: 1.9 MG/DL — SIGNIFICANT CHANGE UP (ref 1.6–2.6)
MANUAL SMEAR VERIFICATION: SIGNIFICANT CHANGE UP
MCHC RBC-ENTMCNC: 26.2 PG — LOW (ref 27–34)
MCHC RBC-ENTMCNC: 30.9 GM/DL — LOW (ref 32–36)
MCV RBC AUTO: 84.8 FL — SIGNIFICANT CHANGE UP (ref 80–100)
MICROCYTES BLD QL: SLIGHT — SIGNIFICANT CHANGE UP
MONOCYTES # BLD AUTO: 0.16 K/UL — SIGNIFICANT CHANGE UP (ref 0–0.9)
MONOCYTES NFR BLD AUTO: 5.3 % — SIGNIFICANT CHANGE UP (ref 2–14)
NEUTROPHILS # BLD AUTO: 2.14 K/UL — SIGNIFICANT CHANGE UP (ref 1.8–7.4)
NEUTROPHILS NFR BLD AUTO: 73.2 % — SIGNIFICANT CHANGE UP (ref 43–77)
PHOSPHATE SERPL-MCNC: 2.6 MG/DL — SIGNIFICANT CHANGE UP (ref 2.5–4.5)
PLAT MORPH BLD: NORMAL — SIGNIFICANT CHANGE UP
PLATELET # BLD AUTO: 204 K/UL — SIGNIFICANT CHANGE UP (ref 150–400)
POIKILOCYTOSIS BLD QL AUTO: SIGNIFICANT CHANGE UP
POTASSIUM SERPL-MCNC: 4.2 MMOL/L — SIGNIFICANT CHANGE UP (ref 3.5–5.3)
POTASSIUM SERPL-SCNC: 4.2 MMOL/L — SIGNIFICANT CHANGE UP (ref 3.5–5.3)
PROT SERPL-MCNC: 6 G/DL — SIGNIFICANT CHANGE UP (ref 6–8.3)
PROTHROM AB SERPL-ACNC: 15.1 SEC — HIGH (ref 10.6–13.6)
RBC # BLD: 2.9 M/UL — LOW (ref 4.2–5.8)
RBC # FLD: 16 % — HIGH (ref 10.3–14.5)
RBC BLD AUTO: ABNORMAL
SCHISTOCYTES BLD QL AUTO: SLIGHT — SIGNIFICANT CHANGE UP
SODIUM SERPL-SCNC: 135 MMOL/L — SIGNIFICANT CHANGE UP (ref 135–145)
WBC # BLD: 2.93 K/UL — LOW (ref 3.8–10.5)
WBC # FLD AUTO: 2.93 K/UL — LOW (ref 3.8–10.5)

## 2021-12-27 PROCEDURE — 71045 X-RAY EXAM CHEST 1 VIEW: CPT | Mod: 26

## 2021-12-27 PROCEDURE — 99233 SBSQ HOSP IP/OBS HIGH 50: CPT | Mod: GC

## 2021-12-27 PROCEDURE — 99232 SBSQ HOSP IP/OBS MODERATE 35: CPT

## 2021-12-27 RX ORDER — INSULIN LISPRO 100/ML
3 VIAL (ML) SUBCUTANEOUS
Refills: 0 | Status: DISCONTINUED | OUTPATIENT
Start: 2021-12-27 | End: 2021-12-29

## 2021-12-27 RX ORDER — INSULIN GLARGINE 100 [IU]/ML
5 INJECTION, SOLUTION SUBCUTANEOUS AT BEDTIME
Refills: 0 | Status: DISCONTINUED | OUTPATIENT
Start: 2021-12-27 | End: 2021-12-29

## 2021-12-27 RX ADMIN — Medication 2: at 22:13

## 2021-12-27 RX ADMIN — HEPARIN SODIUM 5000 UNIT(S): 5000 INJECTION INTRAVENOUS; SUBCUTANEOUS at 05:47

## 2021-12-27 RX ADMIN — Medication 2 UNIT(S): at 08:34

## 2021-12-27 RX ADMIN — Medication 500000 UNIT(S): at 17:51

## 2021-12-27 RX ADMIN — EVEROLIMUS 4 MILLIGRAM(S): 10 TABLET ORAL at 05:48

## 2021-12-27 RX ADMIN — Medication 1 TABLET(S): at 12:36

## 2021-12-27 RX ADMIN — HEPARIN SODIUM 5000 UNIT(S): 5000 INJECTION INTRAVENOUS; SUBCUTANEOUS at 13:20

## 2021-12-27 RX ADMIN — Medication 3 UNIT(S): at 17:51

## 2021-12-27 RX ADMIN — INSULIN GLARGINE 5 UNIT(S): 100 INJECTION, SOLUTION SUBCUTANEOUS at 22:13

## 2021-12-27 RX ADMIN — ATORVASTATIN CALCIUM 40 MILLIGRAM(S): 80 TABLET, FILM COATED ORAL at 22:15

## 2021-12-27 RX ADMIN — Medication 2: at 17:50

## 2021-12-27 RX ADMIN — Medication 2 UNIT(S): at 12:36

## 2021-12-27 RX ADMIN — Medication 1: at 12:34

## 2021-12-27 RX ADMIN — Medication 500000 UNIT(S): at 05:46

## 2021-12-27 RX ADMIN — VALGANCICLOVIR 450 MILLIGRAM(S): 450 TABLET, FILM COATED ORAL at 12:35

## 2021-12-27 RX ADMIN — Medication 500000 UNIT(S): at 22:14

## 2021-12-27 RX ADMIN — PANTOPRAZOLE SODIUM 40 MILLIGRAM(S): 20 TABLET, DELAYED RELEASE ORAL at 17:52

## 2021-12-27 RX ADMIN — PANTOPRAZOLE SODIUM 40 MILLIGRAM(S): 20 TABLET, DELAYED RELEASE ORAL at 05:47

## 2021-12-27 RX ADMIN — Medication 1: at 08:33

## 2021-12-27 RX ADMIN — Medication 5 MILLIGRAM(S): at 05:47

## 2021-12-27 RX ADMIN — HEPARIN SODIUM 5000 UNIT(S): 5000 INJECTION INTRAVENOUS; SUBCUTANEOUS at 22:16

## 2021-12-27 RX ADMIN — EVEROLIMUS 4 MILLIGRAM(S): 10 TABLET ORAL at 17:52

## 2021-12-27 RX ADMIN — Medication 500000 UNIT(S): at 12:35

## 2021-12-27 NOTE — PROGRESS NOTE ADULT - SUBJECTIVE AND OBJECTIVE BOX
PROGRESS NOTE:     CONTACT INFO:   Carrera (Xiao) Verito   NS: [phone]/[facility]: [id number]  PGY-3    Patient is a 66y old  Male who presents with a chief complaint of Fall (26 Dec 2021 06:22)      SUBJECTIVE / OVERNIGHT EVENTS:    ADDITIONAL REVIEW OF SYSTEMS:    MEDICATIONS  (STANDING):  atorvastatin 40 milliGRAM(s) Oral at bedtime  dextrose 40% Gel 15 Gram(s) Oral once  dextrose 5%. 1000 milliLiter(s) (50 mL/Hr) IV Continuous <Continuous>  dextrose 5%. 1000 milliLiter(s) (100 mL/Hr) IV Continuous <Continuous>  dextrose 50% Injectable 25 Gram(s) IV Push once  dextrose 50% Injectable 12.5 Gram(s) IV Push once  dextrose 50% Injectable 25 Gram(s) IV Push once  everolimus (ZORTRESS) 4 milliGRAM(s) Oral two times a day  glucagon  Injectable 1 milliGRAM(s) IntraMuscular once  heparin   Injectable 5000 Unit(s) SubCutaneous every 8 hours  insulin glargine Injectable (LANTUS) 4 Unit(s) SubCutaneous at bedtime  insulin lispro (ADMELOG) corrective regimen sliding scale   SubCutaneous three times a day before meals  insulin lispro (ADMELOG) corrective regimen sliding scale   SubCutaneous at bedtime  insulin lispro Injectable (ADMELOG) 2 Unit(s) SubCutaneous three times a day before meals  nystatin    Suspension 079955 Unit(s) Oral four times a day  pantoprazole    Tablet 40 milliGRAM(s) Oral two times a day  predniSONE   Tablet 5 milliGRAM(s) Oral daily  trimethoprim   80 mG/sulfamethoxazole 400 mG 1 Tablet(s) Oral daily  valGANciclovir 450 milliGRAM(s) Oral daily    MEDICATIONS  (PRN):  benzocaine 15 mG/menthol 3.6 mG (Sugar-Free) Lozenge 1 Lozenge Oral every 6 hours PRN Sore Throat  benzonatate 100 milliGRAM(s) Oral every 8 hours PRN Cough      CAPILLARY BLOOD GLUCOSE      POCT Blood Glucose.: 167 mg/dL (27 Dec 2021 08:12)  POCT Blood Glucose.: 218 mg/dL (26 Dec 2021 22:17)  POCT Blood Glucose.: 214 mg/dL (26 Dec 2021 16:55)  POCT Blood Glucose.: 162 mg/dL (26 Dec 2021 12:04)    I&O's Summary    26 Dec 2021 07:01  -  27 Dec 2021 07:00  --------------------------------------------------------  IN: 120 mL / OUT: 900 mL / NET: -780 mL        PHYSICAL EXAM:  Vital Signs Last 24 Hrs  T(C): 36.8 (27 Dec 2021 05:20), Max: 37.6 (26 Dec 2021 21:06)  T(F): 98.2 (27 Dec 2021 05:20), Max: 99.7 (26 Dec 2021 21:06)  HR: 76 (27 Dec 2021 05:20) (76 - 87)  BP: 133/72 (27 Dec 2021 05:20) (120/66 - 139/69)  BP(mean): --  RR: 18 (27 Dec 2021 05:20) (18 - 20)  SpO2: 95% (27 Dec 2021 05:20) (95% - 95%)    CONSTITUTIONAL: NAD, well-developed  RESPIRATORY: Normal respiratory effort; lungs are clear to auscultation bilaterally  CARDIOVASCULAR: Regular rate and rhythm, normal S1 and S2, no murmur/rub/gallop; No lower extremity edema; Peripheral pulses are 2+ bilaterally  ABDOMEN: Nontender to palpation, normoactive bowel sounds, no rebound/guarding; No hepatosplenomegaly  MUSCLOSKELETAL: no clubbing or cyanosis of digits; no joint swelling or tenderness to palpation  PSYCH: A+O to person, place, and time; affect appropriate    LABS:                        7.6    2.93  )-----------( 204      ( 27 Dec 2021 06:46 )             24.6     12-27    135  |  104  |  36<H>  ----------------------------<  140<H>  4.2   |  21<L>  |  1.78<H>    Ca    8.3<L>      27 Dec 2021 06:46  Phos  2.6     12-27  Mg     1.9     12-27    TPro  6.0  /  Alb  2.7<L>  /  TBili  0.3  /  DBili  x   /  AST  31  /  ALT  31  /  AlkPhos  201<H>  12-27    PT/INR - ( 27 Dec 2021 06:48 )   PT: 15.1 sec;   INR: 1.27 ratio                     RADIOLOGY & ADDITIONAL TESTS:  Results Reviewed:   Imaging Personally Reviewed:  Electrocardiogram Personally Reviewed:    COORDINATION OF CARE:  Care Discussed with Consultants/Other Providers [Y/N]:  Prior or Outpatient Records Reviewed [Y/N]:   PROGRESS NOTE:     CONTACT INFO:   Deandre Sellers (Xiao)   NS: [phone]/[facility]: [id number]  PGY-3    Patient is a 66y old  Male who presents with a chief complaint of Fall (26 Dec 2021 06:22)      SUBJECTIVE / OVERNIGHT EVENTS: no acute event overnight.     ADDITIONAL REVIEW OF SYSTEMS: reports feeling well. Ada sob, fever, chills. Urinating well.    MEDICATIONS  (STANDING):  atorvastatin 40 milliGRAM(s) Oral at bedtime  dextrose 40% Gel 15 Gram(s) Oral once  dextrose 5%. 1000 milliLiter(s) (50 mL/Hr) IV Continuous <Continuous>  dextrose 5%. 1000 milliLiter(s) (100 mL/Hr) IV Continuous <Continuous>  dextrose 50% Injectable 25 Gram(s) IV Push once  dextrose 50% Injectable 12.5 Gram(s) IV Push once  dextrose 50% Injectable 25 Gram(s) IV Push once  everolimus (ZORTRESS) 4 milliGRAM(s) Oral two times a day  glucagon  Injectable 1 milliGRAM(s) IntraMuscular once  heparin   Injectable 5000 Unit(s) SubCutaneous every 8 hours  insulin glargine Injectable (LANTUS) 4 Unit(s) SubCutaneous at bedtime  insulin lispro (ADMELOG) corrective regimen sliding scale   SubCutaneous three times a day before meals  insulin lispro (ADMELOG) corrective regimen sliding scale   SubCutaneous at bedtime  insulin lispro Injectable (ADMELOG) 2 Unit(s) SubCutaneous three times a day before meals  nystatin    Suspension 366034 Unit(s) Oral four times a day  pantoprazole    Tablet 40 milliGRAM(s) Oral two times a day  predniSONE   Tablet 5 milliGRAM(s) Oral daily  trimethoprim   80 mG/sulfamethoxazole 400 mG 1 Tablet(s) Oral daily  valGANciclovir 450 milliGRAM(s) Oral daily    MEDICATIONS  (PRN):  benzocaine 15 mG/menthol 3.6 mG (Sugar-Free) Lozenge 1 Lozenge Oral every 6 hours PRN Sore Throat  benzonatate 100 milliGRAM(s) Oral every 8 hours PRN Cough      CAPILLARY BLOOD GLUCOSE      POCT Blood Glucose.: 167 mg/dL (27 Dec 2021 08:12)  POCT Blood Glucose.: 218 mg/dL (26 Dec 2021 22:17)  POCT Blood Glucose.: 214 mg/dL (26 Dec 2021 16:55)  POCT Blood Glucose.: 162 mg/dL (26 Dec 2021 12:04)    I&O's Summary    26 Dec 2021 07:01  -  27 Dec 2021 07:00  --------------------------------------------------------  IN: 120 mL / OUT: 900 mL / NET: -780 mL        PHYSICAL EXAM:  Vital Signs Last 24 Hrs  T(C): 36.8 (27 Dec 2021 05:20), Max: 37.6 (26 Dec 2021 21:06)  T(F): 98.2 (27 Dec 2021 05:20), Max: 99.7 (26 Dec 2021 21:06)  HR: 76 (27 Dec 2021 05:20) (76 - 87)  BP: 133/72 (27 Dec 2021 05:20) (120/66 - 139/69)  BP(mean): --  RR: 18 (27 Dec 2021 05:20) (18 - 20)  SpO2: 95% (27 Dec 2021 05:20) (95% - 95%)    CONSTITUTIONAL: NAD, well-developed, laying in bed comfortably  RESPIRATORY: Normal respiratory effort; lungs are clear to auscultation bilaterally  CARDIOVASCULAR: Regular rate and rhythm, normal S1 and S2, no murmur/rub/gallop; No lower extremity edema  ABDOMEN: Nontender to palpation, normoactive bowel sounds, no rebound/guarding  MUSCLOSKELETAL: no clubbing or cyanosis of digits; no joint swelling or tenderness to palpation  PSYCH: A+O to person, place, and time; affect appropriate    LABS:                        7.6    2.93  )-----------( 204      ( 27 Dec 2021 06:46 )             24.6     12-27    135  |  104  |  36<H>  ----------------------------<  140<H>  4.2   |  21<L>  |  1.78<H>    Ca    8.3<L>      27 Dec 2021 06:46  Phos  2.6     12-27  Mg     1.9     12-27    TPro  6.0  /  Alb  2.7<L>  /  TBili  0.3  /  DBili  x   /  AST  31  /  ALT  31  /  AlkPhos  201<H>  12-27    PT/INR - ( 27 Dec 2021 06:48 )   PT: 15.1 sec;   INR: 1.27 ratio                     RADIOLOGY & ADDITIONAL TESTS:  Results Reviewed:   Imaging Personally Reviewed:  Electrocardiogram Personally Reviewed:    COORDINATION OF CARE:  Care Discussed with Consultants/Other Providers [Y/N]:  Prior or Outpatient Records Reviewed [Y/N]:

## 2021-12-27 NOTE — PROGRESS NOTE ADULT - PROBLEM SELECTOR PLAN 2
Cough and sore throat for the past few days. RVP positive for covid at this visit  - Currently in PICU (Covid unit)  - satting well on room air  - cough drops PRN  - s/p remdesivir (12/20 - 12/25 )  - s/p monoclonal antibody infusion (12/20)  - CT Cough and sore throat for the past few days. RVP positive for covid at this visit  - Currently in PICU (Covid unit)  - satting well on room air. Desats at night 2/2 SHENG. Refused cpap  - cough drops PRN  - s/p remdesivir (12/20 - 12/25 )  - s/p monoclonal antibody infusion (12/20)  - [facility]

## 2021-12-27 NOTE — PROGRESS NOTE ADULT - PROBLEM SELECTOR PLAN 9
On home APAP per sleep note on [employer]  - Will allow patient to use CPAP given he is desatting when sleeping per nurse. On home APAP per sleep note on [employer]  -pt been refusing

## 2021-12-27 NOTE — PROGRESS NOTE ADULT - PROBLEM SELECTOR PLAN 7
- Transplant hepatology consulted  - Transplant surgery consulted  - Transplant ID consulted  - prednisone 5  - everolimus  - cellcept 500 q12 held per recs  - bactrim, valcyte for prophylaxis - Transplant hepatology recs appreciated  - Transplant surgery recs appreciated  - Transplant ID recs appreciated  - prednisone 5  - everolimus  - cellcept 500 q12 held per recs  - bactrim, valcyte for prophylaxis

## 2021-12-27 NOTE — PROGRESS NOTE ADULT - SUBJECTIVE AND OBJECTIVE BOX
Follow Up:  COVID19    Interval History:    REVIEW OF SYSTEMS  [  ] ROS unobtainable because:    [  ] All other systems negative except as noted below:     Constitutional:  [ ] fever [ ] chills  [ ] weight loss  [ ] weakness  Skin:  [ ] rash [ ] phlebitis	  Eyes: [ ] icterus [ ] pain  [ ] discharge	  ENMT: [ ] sore throat  [ ] thrush [ ] ulcers [ ] exudates  Respiratory: [  ] dyspnea [ ] hemoptysis [ ] cough [ ] sputum	  Cardiovascular:  [ ] chest pain [ ] palpitations [ ] edema	  Gastrointestinal:  [ ] nausea [ ] vomiting [ ] diarrhea [ ] constipation [ ] pain	  Genitourinary:  [ ] dysuria [ ] frequency [ ] hematuria [ ] discharge [ ] flank pain  [ ] incontinence  Musculoskeletal:  [ ] myalgias [ ] arthralgias [ ] arthritis  [ ] back pain  Neurological:  [ ] headache [ ] seizures  [ ] confusion/altered mental status    Allergies  codeine (Anaphylaxis)        ANTIMICROBIALS:  nystatin    Suspension [id number] four times a day  trimethoprim   80 mG/sulfamethoxazole 400 mG 1 daily  valGANciclovir 450 daily      OTHER MEDS:  MEDICATIONS  (STANDING):  atorvastatin 40 at bedtime  benzonatate 100 every 8 hours PRN  dextrose 40% Gel 15 once  dextrose 50% Injectable 25 once  dextrose 50% Injectable 12.5 once  dextrose 50% Injectable 25 once  everolimus (ZORTRESS) 4 two times a day  glucagon  Injectable 1 once  heparin   Injectable 5000 every 8 hours  insulin glargine Injectable (LANTUS) 5 at bedtime  insulin lispro (ADMELOG) corrective regimen sliding scale  three times a day before meals  insulin lispro (ADMELOG) corrective regimen sliding scale  at bedtime  insulin lispro Injectable (ADMELOG) 3 three times a day before meals  pantoprazole    Tablet 40 two times a day  predniSONE   Tablet 5 daily      Vital Signs Last 24 Hrs  T(C): 37.1 (27 Dec 2021 16:23), Max: 37.6 (26 Dec 2021 21:06)  T(F): 98.8 (27 Dec 2021 16:23), Max: 99.7 (26 Dec 2021 21:06)  HR: 83 (27 Dec 2021 16:23) (76 - 87)  BP: 134/69 (27 Dec 2021 16:23) (133/72 - 139/69)  BP(mean): --  RR: 18 (27 Dec 2021 16:23) (18 - 18)  SpO2: 93% (27 Dec 2021 16:23) (92% - 95%)    PHYSICAL EXAMINATION:    General: Alert and Awake, NAD  HEENT: PERRL, EOMI  Neck: Supple  Cardiac: RRR, No M/R/G  Resp: CTAB, No Wh/Rh/Ra  Abdomen: NBS, NT/ND, No HSM, No rigidity or guarding  MSK: No LE edema. No Calf tenderness  : No bob  Skin: No rashes or lesions. Skin is warm and dry to the touch.   Neuro: Alert and Awake. CN 2-12 Grossly intact. Moves all four extremities spontaneously.  Psych: Calm, Pleasant, Cooperative    LABORATORY:                          7.6    2.93  )-----------( 204      ( 27 Dec 2021 06:46 )             24.6       12-27    135  |  104  |  36<H>  ----------------------------<  140<H>  4.2   |  21<L>  |  1.78<H>    Ca    8.3<L>      27 Dec 2021 06:46  Phos  2.6     12-27  Mg     1.9     12-27    TPro  6.0  /  Alb  2.7<L>  /  TBili  0.3  /  DBili  x   /  AST  31  /  ALT  31  /  AlkPhos  201<H>  12-27    C-Reactive Protein, Serum: 156 mg/L (12-26-21 @ 07:31)  C-Reactive Protein, Serum: 140 mg/L (12-24-21 @ 12:31)  C-Reactive Protein, Serum: 141 mg/L (12-21-21 @ 07:14)    Ferritin, Serum: 1638 ng/mL (12-23-21 @ 08:28)  Ferritin, Serum: 2626 ng/mL (12-21-21 @ 12:54)    D-Dimer Assay, Quantitative: 632 ng/mL DDU (12-26-21 @ 07:32)  D-Dimer Assay, Quantitative: 492 ng/mL DDU (12-24-21 @ 07:00)  D-Dimer Assay, Quantitative: 598 ng/mL DDU (12-21-21 @ 08:03)    MICROBIOLOGY:    Clean Catch Clean Catch (Midstream)  12-20-21   <10,000 CFU/mL Normal Urogenital Shantel  --  --      .Blood Blood-Peripheral  12-20-21   No Growth Final  --  --      .Blood Blood-Peripheral  12-20-21   No Growth Final  --  --                RADIOLOGY:    <The imaging below has been reviewed and visualized by me independently. Findings as detailed in report below> Follow Up:  COVID19    Interval History: afebrile. cough somewhat improved. since the weekend requiring 2L NC.     REVIEW OF SYSTEMS  [  ] ROS unobtainable because:    [ x ] All other systems negative except as noted below:     Constitutional:  [ ] fever [ ] chills  [ ] weight loss  [ ] weakness  Skin:  [ ] rash [ ] phlebitis	  Eyes: [ ] icterus [ ] pain  [ ] discharge	  ENMT: [ ] sore throat  [ ] thrush [ ] ulcers [ ] exudates  Respiratory: [  ] dyspnea [ ] hemoptysis [x ] cough [ ] sputum	  Cardiovascular:  [ ] chest pain [ ] palpitations [ ] edema	  Gastrointestinal:  [ ] nausea [ ] vomiting [ ] diarrhea [ ] constipation [ ] pain	  Genitourinary:  [ ] dysuria [ ] frequency [ ] hematuria [ ] discharge [ ] flank pain  [ ] incontinence  Musculoskeletal:  [ ] myalgias [ ] arthralgias [ ] arthritis  [ ] back pain  Neurological:  [ ] headache [ ] seizures  [ ] confusion/altered mental status    Allergies  codeine (Anaphylaxis)        ANTIMICROBIALS:  nystatin    Suspension [id number] four times a day  trimethoprim   80 mG/sulfamethoxazole 400 mG 1 daily  valGANciclovir 450 daily      OTHER MEDS:  MEDICATIONS  (STANDING):  atorvastatin 40 at bedtime  benzonatate 100 every 8 hours PRN  dextrose 40% Gel 15 once  dextrose 50% Injectable 25 once  dextrose 50% Injectable 12.5 once  dextrose 50% Injectable 25 once  everolimus (ZORTRESS) 4 two times a day  glucagon  Injectable 1 once  heparin   Injectable 5000 every 8 hours  insulin glargine Injectable (LANTUS) 5 at bedtime  insulin lispro (ADMELOG) corrective regimen sliding scale  three times a day before meals  insulin lispro (ADMELOG) corrective regimen sliding scale  at bedtime  insulin lispro Injectable (ADMELOG) 3 three times a day before meals  pantoprazole    Tablet 40 two times a day  predniSONE   Tablet 5 daily      Vital Signs Last 24 Hrs  T(C): 37.1 (27 Dec 2021 16:23), Max: 37.6 (26 Dec 2021 21:06)  T(F): 98.8 (27 Dec 2021 16:23), Max: 99.7 (26 Dec 2021 21:06)  HR: 83 (27 Dec 2021 16:23) (76 - 87)  BP: 134/69 (27 Dec 2021 16:23) (133/72 - 139/69)  BP(mean): --  RR: 18 (27 Dec 2021 16:23) (18 - 18)  SpO2: 93% (27 Dec 2021 16:23) (92% - 95%)    PHYSICAL EXAMINATION:    General: Alert and Awake, NAD  Cardiac: RRR, No M/R/G  Resp: CTAB, No Wh/Rh/Ra  Abdomen: NBS, NT/ND, No HSM, No rigidity or guarding  MSK: No LE edema. No Calf tenderness  : No bob  Skin: No rashes or lesions. Skin is warm and dry to the touch.   Neuro: Alert and Awake. CN 2-12 Grossly intact. Moves all four extremities spontaneously.  Psych: Calm, Pleasant, Cooperative    LABORATORY:                          7.6    2.93  )-----------( 204      ( 27 Dec 2021 06:46 )             24.6       12-27    135  |  104  |  36<H>  ----------------------------<  140<H>  4.2   |  21<L>  |  1.78<H>    Ca    8.3<L>      27 Dec 2021 06:46  Phos  2.6     12-27  Mg     1.9     12-27    TPro  6.0  /  Alb  2.7<L>  /  TBili  0.3  /  DBili  x   /  AST  31  /  ALT  31  /  AlkPhos  201<H>  12-27    C-Reactive Protein, Serum: 156 mg/L (12-26-21 @ 07:31)  C-Reactive Protein, Serum: 140 mg/L (12-24-21 @ 12:31)  C-Reactive Protein, Serum: 141 mg/L (12-21-21 @ 07:14)    Ferritin, Serum: 1638 ng/mL (12-23-21 @ 08:28)  Ferritin, Serum: 2626 ng/mL (12-21-21 @ 12:54)    D-Dimer Assay, Quantitative: 632 ng/mL DDU (12-26-21 @ 07:32)  D-Dimer Assay, Quantitative: 492 ng/mL DDU (12-24-21 @ 07:00)  D-Dimer Assay, Quantitative: 598 ng/mL DDU (12-21-21 @ 08:03)    MICROBIOLOGY:    Clean Catch Clean Catch (Midstream)  12-20-21   <10,000 CFU/mL Normal Urogenital Shantel  --  --      .Blood Blood-Peripheral  12-20-21   No Growth Final  --  --      .Blood Blood-Peripheral  12-20-21   No Growth Final  --  --    RADIOLOGY:    <The imaging below has been reviewed and visualized by me independently. Findings as detailed in report below> Follow Up:  COVID19    Interval History: afebrile. cough somewhat improved. since the weekend requiring 2L NC.     REVIEW OF SYSTEMS  [  ] ROS unobtainable because:    [ x ] All other systems negative except as noted below:     Constitutional:  [ ] fever [ ] chills  [ ] weight loss  [ ] weakness  Skin:  [ ] rash [ ] phlebitis	  Eyes: [ ] icterus [ ] pain  [ ] discharge	  ENMT: [ ] sore throat  [ ] thrush [ ] ulcers [ ] exudates  Respiratory: [  ] dyspnea [ ] hemoptysis [x ] cough [ ] sputum	  Cardiovascular:  [ ] chest pain [ ] palpitations [ ] edema	  Gastrointestinal:  [ ] nausea [ ] vomiting [ ] diarrhea [ ] constipation [ ] pain	  Genitourinary:  [ ] dysuria [ ] frequency [ ] hematuria [ ] discharge [ ] flank pain  [ ] incontinence  Musculoskeletal:  [ ] myalgias [ ] arthralgias [ ] arthritis  [ ] back pain  Neurological:  [ ] headache [ ] seizures  [ ] confusion/altered mental status    Allergies  codeine (Anaphylaxis)        ANTIMICROBIALS:  nystatin    Suspension [id number] four times a day  trimethoprim   80 mG/sulfamethoxazole 400 mG 1 daily  valGANciclovir 450 daily      OTHER MEDS:  MEDICATIONS  (STANDING):  atorvastatin 40 at bedtime  benzonatate 100 every 8 hours PRN  dextrose 40% Gel 15 once  dextrose 50% Injectable 25 once  dextrose 50% Injectable 12.5 once  dextrose 50% Injectable 25 once  everolimus (ZORTRESS) 4 two times a day  glucagon  Injectable 1 once  heparin   Injectable 5000 every 8 hours  insulin glargine Injectable (LANTUS) 5 at bedtime  insulin lispro (ADMELOG) corrective regimen sliding scale  three times a day before meals  insulin lispro (ADMELOG) corrective regimen sliding scale  at bedtime  insulin lispro Injectable (ADMELOG) 3 three times a day before meals  pantoprazole    Tablet 40 two times a day  predniSONE   Tablet 5 daily      Vital Signs Last 24 Hrs  T(C): 37.1 (27 Dec 2021 16:23), Max: 37.6 (26 Dec 2021 21:06)  T(F): 98.8 (27 Dec 2021 16:23), Max: 99.7 (26 Dec 2021 21:06)  HR: 83 (27 Dec 2021 16:23) (76 - 87)  BP: 134/69 (27 Dec 2021 16:23) (133/72 - 139/69)  BP(mean): --  RR: 18 (27 Dec 2021 16:23) (18 - 18)  SpO2: 93% (27 Dec 2021 16:23) (92% - 95%)    PHYSICAL EXAMINATION:    General: Alert and Awake, NAD, 2L NC  Cardiac: RRR, No M/R/G  Resp: CTAB, No Wh/Rh/Ra  Abdomen: NBS, NT/ND, No HSM, No rigidity or guarding  MSK: No LE edema. No Calf tenderness  : No bob  Skin: No rashes or lesions. Skin is warm and dry to the touch.   Neuro: Alert and Awake. CN 2-12 Grossly intact. Moves all four extremities spontaneously.  Psych: Calm, Pleasant, Cooperative    LABORATORY:                          7.6    2.93  )-----------( 204      ( 27 Dec 2021 06:46 )             24.6       12-27    135  |  104  |  36<H>  ----------------------------<  140<H>  4.2   |  21<L>  |  1.78<H>    Ca    8.3<L>      27 Dec 2021 06:46  Phos  2.6     12-27  Mg     1.9     12-27    TPro  6.0  /  Alb  2.7<L>  /  TBili  0.3  /  DBili  x   /  AST  31  /  ALT  31  /  AlkPhos  201<H>  12-27    C-Reactive Protein, Serum: 156 mg/L (12-26-21 @ 07:31)  C-Reactive Protein, Serum: 140 mg/L (12-24-21 @ 12:31)  C-Reactive Protein, Serum: 141 mg/L (12-21-21 @ 07:14)    Ferritin, Serum: 1638 ng/mL (12-23-21 @ 08:28)  Ferritin, Serum: 2626 ng/mL (12-21-21 @ 12:54)    D-Dimer Assay, Quantitative: 632 ng/mL DDU (12-26-21 @ 07:32)  D-Dimer Assay, Quantitative: 492 ng/mL DDU (12-24-21 @ 07:00)  D-Dimer Assay, Quantitative: 598 ng/mL DDU (12-21-21 @ 08:03)    MICROBIOLOGY:    Clean Catch Clean Catch (Midstream)  12-20-21   <10,000 CFU/mL Normal Urogenital Shantel  --  --      .Blood Blood-Peripheral  12-20-21   No Growth Final  --  --      .Blood Blood-Peripheral  12-20-21   No Growth Final  --  --    RADIOLOGY:    <The imaging below has been reviewed and visualized by me independently. Findings as detailed in report below>    < from: Xray Chest 1 View- PORTABLE-Urgent (Xray Chest 1 View- PORTABLE-Urgent .) (12.27.21 @ 11:41) >  IMPRESSION:  Bilateral patchy opacities, more prominent on the left.    < end of copied text >

## 2021-12-27 NOTE — PROGRESS NOTE ADULT - PROBLEM SELECTOR PLAN 3
CT chest concerning for possible beginnings of multifocal pneumonia. May be the beginnings of covid pneumonia vs everolimus side effect  - See above (coronavirus)  - Pulm consult: are lung findings secondary to covid or everolimus side effect? Unable to be determined by imaging  - Empiric antibiotics for five days (ceftriaxone 12/21 - 12/25) per ID  - Will CTM for now CT chest concerning for possible beginnings of multifocal pneumonia. May be the beginnings of covid pneumonia vs everolimus side effect  - See above (coronavirus)  - Pulm consult: are lung findings secondary to covid or everolimus side effect? Unable to be determined by imaging  - Empiric antibiotics for five days (ceftriaxone 12/21 - 12/25) per ID

## 2021-12-27 NOTE — PROGRESS NOTE ADULT - ATTENDING COMMENTS
65M, HTN, DM2, obesity, SHENG on noct. 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 after fall in the bathroom after he got up, felt dizzy and weak. Here still orthostatic per physical therapy  - COVID-19 with cough for a few weeks, but several negative PCRs until the one on admission, mild hypoxia sO2 95% on O2 2L/min during the day and CPAP at night  - CKD  - immunosuppression with everolimus 3 mg bid, off MMF due to infection, on prednisone 5 mg/d. On valcyte prophylaxis.    -- continue current immunosuppression

## 2021-12-27 NOTE — PROGRESS NOTE ADULT - SUBJECTIVE AND OBJECTIVE BOX
Gastroenterology/Hepatology Progress Note      Interval Events:   - fluctuating O2 overnight, sometimes requiring 2L NC  - no cough  - no new complaints     Allergies:  codeine (Anaphylaxis)      Hospital Medications:  atorvastatin 40 milliGRAM(s) Oral at bedtime  benzocaine 15 mG/menthol 3.6 mG (Sugar-Free) Lozenge 1 Lozenge Oral every 6 hours PRN  benzonatate 100 milliGRAM(s) Oral every 8 hours PRN  dextrose 40% Gel 15 Gram(s) Oral once  dextrose 5%. 1000 milliLiter(s) IV Continuous <Continuous>  dextrose 5%. 1000 milliLiter(s) IV Continuous <Continuous>  dextrose 50% Injectable 25 Gram(s) IV Push once  dextrose 50% Injectable 12.5 Gram(s) IV Push once  dextrose 50% Injectable 25 Gram(s) IV Push once  everolimus (ZORTRESS) 4 milliGRAM(s) Oral two times a day  glucagon  Injectable 1 milliGRAM(s) IntraMuscular once  heparin   Injectable 5000 Unit(s) SubCutaneous every 8 hours  insulin glargine Injectable (LANTUS) 4 Unit(s) SubCutaneous at bedtime  insulin lispro (ADMELOG) corrective regimen sliding scale   SubCutaneous three times a day before meals  insulin lispro (ADMELOG) corrective regimen sliding scale   SubCutaneous at bedtime  insulin lispro Injectable (ADMELOG) 2 Unit(s) SubCutaneous three times a day before meals  nystatin    Suspension [id number] Unit(s) Oral four times a day  pantoprazole    Tablet 40 milliGRAM(s) Oral two times a day  predniSONE   Tablet 5 milliGRAM(s) Oral daily  trimethoprim   80 mG/sulfamethoxazole 400 mG 1 Tablet(s) Oral daily  valGANciclovir 450 milliGRAM(s) Oral daily        PHYSICAL EXAM:   Vital Signs:  Vital Signs Last 24 Hrs  T(C): 36.8 (27 Dec 2021 05:20), Max: 37.6 (26 Dec 2021 21:06)  T(F): 98.2 (27 Dec 2021 05:20), Max: 99.7 (26 Dec 2021 21:06)  HR: 76 (27 Dec 2021 05:20) (76 - 87)  BP: 133/72 (27 Dec 2021 05:20) (120/66 - 139/69)  BP(mean): --  RR: 18 (27 Dec 2021 05:20) (18 - 20)  SpO2: 95% (27 Dec 2021 09:24) (95% - 95%)  Daily     Daily     GENERAL:  No acute distress  HEENT:  NCAT, no scleral icterus  CHEST: no resp distress on 2L NC  HEART:  RRR  ABDOMEN:  surgical site c/d/i, nontender, no r/g  EXTREMITIES:  No cyanosis, clubbing, or edema  SKIN:  No rash/erythema/ecchymoses/petechiae/wounds/abscess/warm/dry  NEURO:  Alert and oriented x 3, no asterixis, no tremor    LABS:                        7.6    2.93  )-----------( 204      ( 27 Dec 2021 06:46 )             24.6     Mean Cell Volume: 84.8 fl (12-27-21 @ 06:46)    12-27    135  |  104  |  36<H>  ----------------------------<  140<H>  4.2   |  21<L>  |  1.78<H>    Ca    8.3<L>      27 Dec 2021 06:46  Phos  2.6     12-27  Mg     1.9     12-27    TPro  6.0  /  Alb  2.7<L>  /  TBili  0.3  /  DBili  x   /  AST  31  /  ALT  31  /  AlkPhos  201<H>  12-27    LIVER FUNCTIONS - ( 27 Dec 2021 06:46 )  Alb: 2.7 g/dL / Pro: 6.0 g/dL / ALK PHOS: 201 U/L / ALT: 31 U/L / AST: 31 U/L / GGT: x           PT/INR - ( 27 Dec 2021 06:48 )   PT: 15.1 sec;   INR: 1.27 ratio                   Imaging:

## 2021-12-27 NOTE — PROGRESS NOTE ADULT - PROBLEM SELECTOR PLAN 8
- home zoloft held in setting of GI bleed  - Patient is amenable to speaking to inpatient psych. Will consult psych - home zoloft held in setting of GI bleed  - consider restarting outpt

## 2021-12-27 NOTE — PROGRESS NOTE ADULT - PROBLEM SELECTOR PLAN 5
H/H slowly downtrending. Per nursing, patient had "dark brown" stools. Unfortunately sample was not saved so could not assess. Previous endoscopy was done roughly 1 year ago for melena and was shown to have varices and bleeding  - Blood consent in chart  - Active type and screen  - trend CBC  - pRBC PRN when hgb less than 7  - Gi prophlyaxis: protonix 40 q12   - FOBT positive  - Hep not interested in scoping currently H/H slowly downtrending. Per nursing, patient had "dark brown" stools. Unfortunately sample was not saved so could not assess. Previous endoscopy was done roughly 1 year ago for melena and was shown to have varices and bleeding. Stable. No additional melena  - Blood consent in chart  - Active type and screen  - trend CBC  - pRBC PRN when hgb less than 7  - Gi prophlyaxis: protonix 40 q12   - FOBT positive  - Hep not interested in scoping currently

## 2021-12-27 NOTE — PROGRESS NOTE ADULT - ASSESSMENT
66 year old male PMH DDLT (07/2020, c/b post-op VRE bacteremia), prior ESBL E. coli prostate abscess, R heel OM (12/2020), C. 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    Found to be COVID19 Positive   U/A (12/20) 261 WBC   CT Chest (12/19) Patchy bilateral nodular and ground glass airspace opacities and Right apical irregular nodular opacity    Patient with persistent sore throat and cough which is largely unchanged since 11/2021.   Of note, patient's son tested positive for COVID19 but around time of last ED visit in mid-November 2021  Patient's COVID19 testing (11/29) and RVP (11/19) was Negative    Even if previous symptoms in 11/2021 were from COVID19 he may now have been exposed to Omicron   He has not mounted a detectable antibody response (as of 11/29/21 spike antibody)  He also now has high grade fevers  s/p Regen-CoV 12/20  s/p 5 days of Remdesivir    #COVID19  s/p Regen-CoV 12/20  s/p 5 days of Remdesivir  --Maintain COVID19 Isolation Precautions (21 days from positive swab, in light of immunocompromised status)  --Recommend Ferritin, CRP, D-Dimer every 48-72H  --CXR with prominent left sided infiltrates; would pursue CT Chest noncontrast  --If any further increase in O2 requirements would switch prednisone to Dexamethasone 6 mg PO/IV Q24H.  at present as O2 saturations WNL    #Fever, Abnormal Urinalysis (but UCx Negative)  Fever likely COVID19 induced (GGO opacities in lungs which may be consistent)  MRSA/MSSA Nasal PCR Positive for MSSA  However, also with new nodular infiltrate on CT Chest in RUL  --s/p 5 days of Ceftriaxone    #Abnormal CT Chest (GGO and Irregular Nodular RUL Infiltrate)  Reviewed imaging with radiology today (12/21)  Unclear if GGO changes are secondary to COVID19 of Everolimus toxicity  Irregular Nodular RUL Infiltrate may be atypical manifestation of COVID19 but will obtain workup as below  Serum Cryptococcal Ag Negative  Fungitell <31  Aspergillus galactomannan negative  --Repeat imaging in ~1 month    I will continue to follow. Please feel free to contact me with any further questions.    Eusebio Pérez M.D.  [facility] Division of Infectious Disease  8AM-5PM: Pager Number [phone]  After Hours (or if no response): Please contact the Infectious Diseases Office at [phone]

## 2021-12-27 NOTE — PROGRESS NOTE ADULT - ATTENDING COMMENTS
Patient seen and examined at bedside. No acute events overnight. He does become hypoxic at night likely secondary to known SHENG for which he does not want CPAP for. Can provide supplemental O2 at night, but not needed during the day. I stood by his bedside for several minutes and his O2 saturation fluctuates between 88% to 100%. He has no complaints. He finished Remdesvir and five days of Ceftriaxone/Cefepime for possible UTI. Patient pending SANTANA placement as discussed with patient and family. Will repeat CXR today per hepatology request.

## 2021-12-27 NOTE — PROGRESS NOTE ADULT - ASSESSMENT
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 Presented to ED 11/19/21 with negative workup; this current presentation now found to be COVID positive w/ CT chest showing patchy groundglass opacities. DDx for symptoms include COVID (though had symptoms of cough 1 month ago) vs everolimus pneumonitis. 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 Patient was seen by pulmonary, unable to differentiate between everolimus pneumonitis and COVID. Case was reviewed with radiology - believes that CT findings more consistent with COVID (patchy ground glass infiltrates) and not everolimus pneumonitis (no fibrosis). Would recommend treating for COVID (remdesivir) and continuing current immunosuppression with plan to re-image in a short interval. If patient's respiratory symptoms worsen, will readdress role of holding everolimus and possibly higher doses of steroids for pneumonitis.     # DM - poorly controlled DM, A1c 10.1    # anemia - patient w/ acute on chronic anemia, had dark stools, though unclear if melena (green around edges) and on iron; will continue w/ supportive management, iron supplementation, transfusions prn, no plans for endoscopic evaluation at this time    Recommendations:  - c/w everolimus 4mg q12h  - hold cellcept   - ok to continue with prednisone 5mg qd  - ok to give remdesivir per protocol  - repeat chest Xray today; if worsening oxygenation consider repeating chest CT  - pantropazole 40mg PO BID  - no plans for endoscopic evaluation   - glycemic control (may benefit from endocrine consult; patient does not currently follow w/ endo)  - trend CMP, INR  - rest of care per primary team    Hepatology will continue to follow.     Charles Salgado, PGY5  Gastroenterology/Hepatology Fellow  Available on [employer] Teams  [phone] ([employer] Short Range Pager)  [phone] (Long Range Pager)    After 5pm, please contact the on-call GI fellow. [phone]

## 2021-12-27 NOTE — PROGRESS NOTE ADULT - PROBLEM SELECTOR PLAN 6
Mildly elevated admission AST/ALT at 45/66 respectively. Recent ultrasound showed good graft flow.   - Transplant hepatology consult Mildly elevated admission AST/ALT at 45/66 respectively. Recent ultrasound showed good graft flow.   - Transplant hepatology recs appreciated

## 2021-12-27 NOTE — PROGRESS NOTE ADULT - PROBLEM SELECTOR PLAN 1
Unclear etiology of the fall. Patient denies loss of consciousness. Low suspicion of a cardiogenic cause with unremarkable trops and EKG. Denies mechanical causes. Patient said that he was "out of it" when he fell but did not lose consciousness. He also said that he urinated on the floor but claims this was intentional. Neurologic cause?   - Neurology following  - Orthostatic blood pressures were positive for orthostatic hypotension  - TTE unremarkable  - c/w telemetry  - Infectious work up: Sputum culture, urine culture, blood cultures so far NGTD  - Pt consul-> rehab

## 2021-12-28 LAB
ALBUMIN SERPL ELPH-MCNC: 2.6 G/DL — LOW (ref 3.3–5)
ALP SERPL-CCNC: 339 U/L — HIGH (ref 40–120)
ALT FLD-CCNC: 105 U/L — HIGH (ref 10–45)
ANION GAP SERPL CALC-SCNC: 11 MMOL/L — SIGNIFICANT CHANGE UP (ref 5–17)
AST SERPL-CCNC: 160 U/L — HIGH (ref 10–40)
BILIRUB SERPL-MCNC: 0.3 MG/DL — SIGNIFICANT CHANGE UP (ref 0.2–1.2)
BUN SERPL-MCNC: 34 MG/DL — HIGH (ref 7–23)
CALCIUM SERPL-MCNC: 8.3 MG/DL — LOW (ref 8.4–10.5)
CHLORIDE SERPL-SCNC: 99 MMOL/L — SIGNIFICANT CHANGE UP (ref 96–108)
CO2 SERPL-SCNC: 20 MMOL/L — LOW (ref 22–31)
CREAT SERPL-MCNC: 1.89 MG/DL — HIGH (ref 0.5–1.3)
CRP SERPL-MCNC: 121 MG/L — HIGH (ref 0–4)
D DIMER BLD IA.RAPID-MCNC: 871 NG/ML DDU — HIGH
FERRITIN SERPL-MCNC: 1525 NG/ML — HIGH (ref 30–400)
GLUCOSE BLDC GLUCOMTR-MCNC: 159 MG/DL — HIGH (ref 70–99)
GLUCOSE BLDC GLUCOMTR-MCNC: 249 MG/DL — HIGH (ref 70–99)
GLUCOSE BLDC GLUCOMTR-MCNC: 252 MG/DL — HIGH (ref 70–99)
GLUCOSE BLDC GLUCOMTR-MCNC: 311 MG/DL — HIGH (ref 70–99)
GLUCOSE BLDC GLUCOMTR-MCNC: 379 MG/DL — HIGH (ref 70–99)
GLUCOSE SERPL-MCNC: 149 MG/DL — HIGH (ref 70–99)
HCT VFR BLD CALC: 24.6 % — LOW (ref 39–50)
HGB BLD-MCNC: 7.6 G/DL — LOW (ref 13–17)
INR BLD: 1.18 RATIO — HIGH (ref 0.88–1.16)
MAGNESIUM SERPL-MCNC: 1.9 MG/DL — SIGNIFICANT CHANGE UP (ref 1.6–2.6)
MCHC RBC-ENTMCNC: 26.2 PG — LOW (ref 27–34)
MCHC RBC-ENTMCNC: 30.9 GM/DL — LOW (ref 32–36)
MCV RBC AUTO: 84.8 FL — SIGNIFICANT CHANGE UP (ref 80–100)
NRBC # BLD: 0 /100 WBCS — SIGNIFICANT CHANGE UP (ref 0–0)
PHOSPHATE SERPL-MCNC: 2.2 MG/DL — LOW (ref 2.5–4.5)
PLATELET # BLD AUTO: 221 K/UL — SIGNIFICANT CHANGE UP (ref 150–400)
POTASSIUM SERPL-MCNC: 4.3 MMOL/L — SIGNIFICANT CHANGE UP (ref 3.5–5.3)
POTASSIUM SERPL-SCNC: 4.3 MMOL/L — SIGNIFICANT CHANGE UP (ref 3.5–5.3)
PROT SERPL-MCNC: 6.3 G/DL — SIGNIFICANT CHANGE UP (ref 6–8.3)
PROTHROM AB SERPL-ACNC: 14.1 SEC — HIGH (ref 10.6–13.6)
RBC # BLD: 2.9 M/UL — LOW (ref 4.2–5.8)
RBC # FLD: 15.9 % — HIGH (ref 10.3–14.5)
SARS-COV-2 RNA SPEC QL NAA+PROBE: DETECTED
SODIUM SERPL-SCNC: 130 MMOL/L — LOW (ref 135–145)
WBC # BLD: 2.85 K/UL — LOW (ref 3.8–10.5)
WBC # FLD AUTO: 2.85 K/UL — LOW (ref 3.8–10.5)

## 2021-12-28 PROCEDURE — 71250 CT THORAX DX C-: CPT | Mod: 26

## 2021-12-28 PROCEDURE — 99233 SBSQ HOSP IP/OBS HIGH 50: CPT

## 2021-12-28 PROCEDURE — 99233 SBSQ HOSP IP/OBS HIGH 50: CPT | Mod: GC

## 2021-12-28 PROCEDURE — 99232 SBSQ HOSP IP/OBS MODERATE 35: CPT | Mod: GC

## 2021-12-28 RX ORDER — DEXAMETHASONE 0.5 MG/5ML
6 ELIXIR ORAL DAILY
Refills: 0 | Status: DISCONTINUED | OUTPATIENT
Start: 2021-12-28 | End: 2022-01-06

## 2021-12-28 RX ADMIN — Medication 3 UNIT(S): at 13:21

## 2021-12-28 RX ADMIN — Medication 500000 UNIT(S): at 05:35

## 2021-12-28 RX ADMIN — ATORVASTATIN CALCIUM 40 MILLIGRAM(S): 80 TABLET, FILM COATED ORAL at 22:43

## 2021-12-28 RX ADMIN — Medication 3 UNIT(S): at 17:11

## 2021-12-28 RX ADMIN — EVEROLIMUS 4 MILLIGRAM(S): 10 TABLET ORAL at 17:59

## 2021-12-28 RX ADMIN — Medication 3 UNIT(S): at 08:24

## 2021-12-28 RX ADMIN — Medication 5 MILLIGRAM(S): at 05:36

## 2021-12-28 RX ADMIN — HEPARIN SODIUM 5000 UNIT(S): 5000 INJECTION INTRAVENOUS; SUBCUTANEOUS at 05:36

## 2021-12-28 RX ADMIN — Medication 500000 UNIT(S): at 11:50

## 2021-12-28 RX ADMIN — Medication 1 TABLET(S): at 11:51

## 2021-12-28 RX ADMIN — VALGANCICLOVIR 450 MILLIGRAM(S): 450 TABLET, FILM COATED ORAL at 11:50

## 2021-12-28 RX ADMIN — PANTOPRAZOLE SODIUM 40 MILLIGRAM(S): 20 TABLET, DELAYED RELEASE ORAL at 18:02

## 2021-12-28 RX ADMIN — Medication 1: at 22:55

## 2021-12-28 RX ADMIN — PANTOPRAZOLE SODIUM 40 MILLIGRAM(S): 20 TABLET, DELAYED RELEASE ORAL at 05:36

## 2021-12-28 RX ADMIN — Medication 2: at 17:11

## 2021-12-28 RX ADMIN — Medication 5: at 13:21

## 2021-12-28 RX ADMIN — Medication 1: at 08:20

## 2021-12-28 RX ADMIN — INSULIN GLARGINE 5 UNIT(S): 100 INJECTION, SOLUTION SUBCUTANEOUS at 22:55

## 2021-12-28 RX ADMIN — HEPARIN SODIUM 5000 UNIT(S): 5000 INJECTION INTRAVENOUS; SUBCUTANEOUS at 14:24

## 2021-12-28 RX ADMIN — EVEROLIMUS 4 MILLIGRAM(S): 10 TABLET ORAL at 05:37

## 2021-12-28 RX ADMIN — HEPARIN SODIUM 5000 UNIT(S): 5000 INJECTION INTRAVENOUS; SUBCUTANEOUS at 22:43

## 2021-12-28 RX ADMIN — Medication 500000 UNIT(S): at 17:56

## 2021-12-28 NOTE — PROGRESS NOTE ADULT - PROBLEM SELECTOR PLAN 7
- Transplant hepatology recs appreciated  - Transplant surgery recs appreciated  - Transplant ID recs appreciated  - prednisone 5  - everolimus  - cellcept 500 q12 held per recs  - bactrim, valcyte for prophylaxis

## 2021-12-28 NOTE — PATIENT PROFILE ADULT - INTERNATIONAL TRAVEL
Daily Note     Today's date: 2020  Patient name: Prabha Cross  : 1930  MRN: [id number]  Referring provider: Martha Sinclair MD  Dx:   Encounter Diagnosis     ICD-10-CM    1  Closed nondisplaced subtrochanteric fracture of left femur with routine healing, subsequent encounter S72 [id number]                   Subjective: Patient reports is not sleeping well due to L hip pain  Pt is planning to get Aleve at [facility] to try at night  " The tylenol does not work"      Objective: See treatment diary below      Assessment: Tolerated current regimen well Allowed for rest between TE as patient is fatigued and also to limit WB   Only ex that produced some soreness was step ups but it was minor in nature  Good tolerance to TE  Shows good endurance despite age  Plan: Continue per plan of care  Precautions ORTHOSTATIC HYPOTENSION, hip fx recently, decreased balance, h/o compression fracture in spine       Specialty Daily Treatment Diary       Manual                              Exercise Diary                 Nu step        Supine hip abd 10x       Heel slides 10x       QS 10x:03       Glut sets 10x:03       bridges        SAQ          Seated HR/TR 2x10 Seated 2x10     LAQ  2# 2x10 2# 3x10       Ball squeeze 2x10 Ball squeeze 2x10     SLR x 3 // bars  2# 2x10 2# 2x10     Ham curls // bars  2# x10 2# 2x10     sidestepping   2# x5 at bars     Heel toe   X 5 at bars     Squats   15x to chair- 2 foam pads 2x10 with 2 foam pads     Step ups FW  4" 10x ea 4 in 2x10     Lateral step up   4 in x10                             Modalities
No

## 2021-12-28 NOTE — PROGRESS NOTE ADULT - ASSESSMENT
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 (course c/b VRE bacteremia, CNI toxicity to both tacro and cyclosporine; switched to everolimus 7/27/20), HLD, obesity, HFpEF presenting w/ fall    # elevated liver enzymes - newly elevated AST/ALT 12/28/21, unclear etiology, no abd pain and mostly hepatocellular injury (unlikely stone disease), DILI (2/2 remdesivir) possible, rejection (while holding MMF) possible, COVID induced ischemic injury possible (though lower levels than expected), will monitor for now  # fall - likely 2/2 pulmonary symptoms; trop negative, TTE (poor study) negative, no events on tele, no EEG, no brain MRI, plan for outpt follow up  # pulmonary symptoms - patient w/ 1+ month of upper respiratory symptoms, of cough. Presented to ED 11/19/21 with negative workup; this current presentation now found to be COVID positive w/ CT chest showing patchy groundglass opacities. DDx for symptoms include COVID (though had symptoms of cough 1 month ago) vs everolimus pneumonitis. 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 Patient was seen by pulmonary, unable to differentiate between everolimus pneumonitis and COVID. Case was reviewed with radiology - believes that CT findings more consistent with COVID (patchy ground glass infiltrates) and not everolimus pneumonitis (no fibrosis). Would recommend treating for COVID (remdesivir) and continuing current immunosuppression with plan to re-image in a short interval. If patient's respiratory symptoms worsen, will readdress role of holding everolimus and possibly higher doses of steroids for pneumonitis.     # DM - poorly controlled DM, A1c 10.1    # anemia - patient w/ acute on chronic anemia, had dark stools, though unclear if melena (green around edges) and on iron; will continue w/ supportive management, iron supplementation, transfusions prn, no plans for endoscopic evaluation at this time    Recommendations:  - check everolimus level  - c/w everolimus 4mg q12h  - hold cellcept   - ok to continue with prednisone 5mg qd  - s/p remdesivir   - CT chest  - please only provide supplemental oxygen if patient actually requires it; patient w/ SHENG and often while sleeping will desat  - pantropazole 40mg PO BID  - no plans for endoscopic evaluation   - glycemic control (may benefit from endocrine consult; patient does not currently follow w/ endo)  - trend CMP, INR  - rest of care per primary team    Hepatology will continue to follow.     Charles Salgado, PGY5  Gastroenterology/Hepatology Fellow  Available on Microsoft Teams  [phone] ([employer] Short Range Pager)  [phone] (Long Range Pager)    After 5pm, please contact the on-call GI fellow. [phone]

## 2021-12-28 NOTE — ED ADULT NURSE NOTE - NS PRO PASSIVE SMOKE EXP
Unknown Otherwise healthy UTD vac, 15m old male here with fever x1 day, cough. sick contacts at home. brought in for nasal flaring and increased WOB. Tylenol x2 at home. While in waiting room sx improved. Tolerating PO, normal wet diapers, no diarrhea, new rashes

## 2021-12-28 NOTE — PROGRESS NOTE ADULT - SUBJECTIVE AND OBJECTIVE BOX
Gastroenterology/Hepatology Progress Note      Interval Events:   - overnight Tmaz 100.1  - remains on 2L NC, however when NC is removed O2 sats remain >95%  - labs w/ newly elevated AST//105 from 31/31, no abd pain    Allergies:  codeine (Anaphylaxis)      Hospital Medications:  atorvastatin 40 milliGRAM(s) Oral at bedtime  benzocaine 15 mG/menthol 3.6 mG (Sugar-Free) Lozenge 1 Lozenge Oral every 6 hours PRN  benzonatate 100 milliGRAM(s) Oral every 8 hours PRN  dextrose 40% Gel 15 Gram(s) Oral once  dextrose 5%. 1000 milliLiter(s) IV Continuous <Continuous>  dextrose 5%. 1000 milliLiter(s) IV Continuous <Continuous>  dextrose 50% Injectable 25 Gram(s) IV Push once  dextrose 50% Injectable 12.5 Gram(s) IV Push once  dextrose 50% Injectable 25 Gram(s) IV Push once  everolimus (ZORTRESS) 4 milliGRAM(s) Oral two times a day  glucagon  Injectable 1 milliGRAM(s) IntraMuscular once  heparin   Injectable 5000 Unit(s) SubCutaneous every 8 hours  insulin glargine Injectable (LANTUS) 5 Unit(s) SubCutaneous at bedtime  insulin lispro (ADMELOG) corrective regimen sliding scale   SubCutaneous three times a day before meals  insulin lispro (ADMELOG) corrective regimen sliding scale   SubCutaneous at bedtime  insulin lispro Injectable (ADMELOG) 3 Unit(s) SubCutaneous three times a day before meals  nystatin    Suspension 966493 Unit(s) Oral four times a day  pantoprazole    Tablet 40 milliGRAM(s) Oral two times a day  predniSONE   Tablet 5 milliGRAM(s) Oral daily  trimethoprim   80 mG/sulfamethoxazole 400 mG 1 Tablet(s) Oral daily  valGANciclovir 450 milliGRAM(s) Oral daily        PHYSICAL EXAM:   Vital Signs:  Vital Signs Last 24 Hrs  T(C): 37.2 (28 Dec 2021 05:13), Max: 37.8 (27 Dec 2021 20:32)  T(F): 98.9 (28 Dec 2021 05:13), Max: 100.1 (27 Dec 2021 20:32)  HR: 87 (28 Dec 2021 05:13) (77 - 89)  BP: 124/62 (28 Dec 2021 05:13) (124/62 - 151/69)  BP(mean): --  RR: 20 (28 Dec 2021 05:13) (18 - 20)  SpO2: 94% (28 Dec 2021 05:13) (92% - 95%)  Daily     Daily     GENERAL:  No acute distress  HEENT:  NCAT, no scleral icterus  CHEST: no resp distress on RA  HEART:  RRR  ABDOMEN:  surgical site c/d/i, nontender, no r/g  EXTREMITIES:  No cyanosis, clubbing, or edema  SKIN:  No rash/erythema/ecchymoses/petechiae/wounds/abscess/warm/dry  NEURO:  Alert and oriented x 3, no asterixis, no tremor    LABS:                        7.6    2.85  )-----------( 221      ( 28 Dec 2021 06:57 )             24.6     Mean Cell Volume: 84.8 fl (12-28-21 @ 06:57)    12-28    130<L>  |  99  |  34<H>  ----------------------------<  149<H>  4.3   |  20<L>  |  1.89<H>    Ca    8.3<L>      28 Dec 2021 06:57  Phos  2.2     12-28  Mg     1.9     12-28    TPro  6.3  /  Alb  2.6<L>  /  TBili  0.3  /  DBili  x   /  AST  160<H>  /  ALT  105<H>  /  AlkPhos  339<H>  12-28    LIVER FUNCTIONS - ( 28 Dec 2021 06:57 )  Alb: 2.6 g/dL / Pro: 6.3 g/dL / ALK PHOS: 339 U/L / ALT: 105 U/L / AST: 160 U/L / GGT: x           PT/INR - ( 28 Dec 2021 06:58 )   PT: 14.1 sec;   INR: 1.18 ratio                   Imaging:

## 2021-12-28 NOTE — DIETITIAN INITIAL EVALUATION ADULT. - ADD RECOMMEND
Continue Consistent Carbohydrate and DASH diet, will provide 2 salt packets with breakfast per pt request to increase PO intake. Pt declined offer for nutrition supplements. Provided dining services phone number to order meals. Monitor PO intake, GI tolerance, skin integrity and labs. RD remains available if needed, pt is aware.

## 2021-12-28 NOTE — PROGRESS NOTE ADULT - PROBLEM SELECTOR PLAN 3
CT chest concerning for possible beginnings of multifocal pneumonia. May be the beginnings of covid pneumonia vs everolimus side effect  - See above (coronavirus)  - Pulm consult: are lung findings secondary to covid or everolimus side effect? Unable to be determined by imaging  - Empiric antibiotics for five days (ceftriaxone 12/21 - 12/25) per ID CT chest concerning for possible beginnings of multifocal pneumonia. May be the beginnings of covid pneumonia vs everolimus side effect  - See above (coronavirus)  - Pulm consult: are lung findings secondary to covid or everolimus side effect? Unable to be determined by imaging  - Empiric antibiotics for five days (ceftriaxone 12/21 - 12/25) per ID  - CT chest showed possibly worsening COVID PNA

## 2021-12-28 NOTE — PATIENT PROFILE ADULT - FALL HARM RISK - HARM RISK INTERVENTIONS

## 2021-12-28 NOTE — PROGRESS NOTE ADULT - SUBJECTIVE AND OBJECTIVE BOX
Oral Schultz, PGY-1  Internal Medicine  Pager: [phone]    PROGRESS NOTE:     Patient is a 66y old  Male who presents with a chief complaint of Fall (27 Dec 2021 17:02)      SUBJECTIVE / OVERNIGHT EVENTS:    ADDITIONAL REVIEW OF SYSTEMS:    MEDICATIONS  (STANDING):  atorvastatin 40 milliGRAM(s) Oral at bedtime  dextrose 40% Gel 15 Gram(s) Oral once  dextrose 5%. 1000 milliLiter(s) (50 mL/Hr) IV Continuous <Continuous>  dextrose 5%. 1000 milliLiter(s) (100 mL/Hr) IV Continuous <Continuous>  dextrose 50% Injectable 25 Gram(s) IV Push once  dextrose 50% Injectable 12.5 Gram(s) IV Push once  dextrose 50% Injectable 25 Gram(s) IV Push once  everolimus (ZORTRESS) 4 milliGRAM(s) Oral two times a day  glucagon  Injectable 1 milliGRAM(s) IntraMuscular once  heparin   Injectable 5000 Unit(s) SubCutaneous every 8 hours  insulin glargine Injectable (LANTUS) 5 Unit(s) SubCutaneous at bedtime  insulin lispro (ADMELOG) corrective regimen sliding scale   SubCutaneous three times a day before meals  insulin lispro (ADMELOG) corrective regimen sliding scale   SubCutaneous at bedtime  insulin lispro Injectable (ADMELOG) 3 Unit(s) SubCutaneous three times a day before meals  nystatin    Suspension [id number] Unit(s) Oral four times a day  pantoprazole    Tablet 40 milliGRAM(s) Oral two times a day  predniSONE   Tablet 5 milliGRAM(s) Oral daily  trimethoprim   80 mG/sulfamethoxazole 400 mG 1 Tablet(s) Oral daily  valGANciclovir 450 milliGRAM(s) Oral daily    MEDICATIONS  (PRN):  benzocaine 15 mG/menthol 3.6 mG (Sugar-Free) Lozenge 1 Lozenge Oral every 6 hours PRN Sore Throat  benzonatate 100 milliGRAM(s) Oral every 8 hours PRN Cough      CAPILLARY BLOOD GLUCOSE      POCT Blood Glucose.: 337 mg/dL (27 Dec 2021 22:01)  POCT Blood Glucose.: 248 mg/dL (27 Dec 2021 17:28)  POCT Blood Glucose.: 197 mg/dL (27 Dec 2021 11:56)  POCT Blood Glucose.: 167 mg/dL (27 Dec 2021 08:12)    I&O's Summary    27 Dec 2021 07:01  -  28 Dec 2021 07:00  --------------------------------------------------------  IN: 620 mL / OUT: 1700 mL / NET: -1080 mL        PHYSICAL EXAM:  Vital Signs Last 24 Hrs  T(C): 37.2 (28 Dec 2021 05:13), Max: 37.8 (27 Dec 2021 20:32)  T(F): 98.9 (28 Dec 2021 05:13), Max: 100.1 (27 Dec 2021 20:32)  HR: 87 (28 Dec 2021 05:13) (77 - 89)  BP: 124/62 (28 Dec 2021 05:13) (124/62 - 151/69)  BP(mean): --  RR: 20 (28 Dec 2021 05:13) (18 - 20)  SpO2: 94% (28 Dec 2021 05:13) (92% - 95%)    CONSTITUTIONAL: NAD, well-developed  HEENT: normal conjunctiva, PEERLA  RESPIRATORY: Normal respiratory effort; lungs are clear to auscultation bilaterally  CARDIOVASCULAR: Regular rate and rhythm, normal S1 and S2, no murmur/rub/gallop;   ABDOMEN: No tenderness to palpation at all four quadrants, +BS  MUSCULOSKELETAL no clubbing or cyanosis of digits; no joint swelling or tenderness to palpation  EXTREMITIES No lower extremity edema; Peripheral pulses are 2+ bilaterally  SKIN: well-perfused, no dry skin    LABS:                        7.6    2.93  )-----------( 204      ( 27 Dec 2021 06:46 )             24.6     12-27    135  |  104  |  36<H>  ----------------------------<  140<H>  4.2   |  21<L>  |  1.78<H>    Ca    8.3<L>      27 Dec 2021 06:46  Phos  2.6     12-27  Mg     1.9     12-27    TPro  6.0  /  Alb  2.7<L>  /  TBili  0.3  /  DBili  x   /  AST  31  /  ALT  31  /  AlkPhos  201<H>  12-27    PT/INR - ( 27 Dec 2021 06:48 )   PT: 15.1 sec;   INR: 1.27 ratio                     RADIOLOGY & ADDITIONAL TESTS:  Results Reviewed:   Imaging Personally Reviewed:  Electrocardiogram Personally Reviewed:    COORDINATION OF CARE:  Care Discussed with Consultants/Other Providers [Y/N]:  Prior or Outpatient Records Reviewed [Y/N]:   Oral Schultz, PGY-1  Internal Medicine  Pager: [phone]    PROGRESS NOTE:     Patient is a 66y old  Male who presents with a chief complaint of Fall (27 Dec 2021 17:02)      SUBJECTIVE / OVERNIGHT EVENTS: No acute events O/N. VSS. Pt satting in low to mid 90s in 2L.    ADDITIONAL REVIEW OF SYSTEMS: -ve    MEDICATIONS  (STANDING):  atorvastatin 40 milliGRAM(s) Oral at bedtime  dextrose 40% Gel 15 Gram(s) Oral once  dextrose 5%. 1000 milliLiter(s) (50 mL/Hr) IV Continuous <Continuous>  dextrose 5%. 1000 milliLiter(s) (100 mL/Hr) IV Continuous <Continuous>  dextrose 50% Injectable 25 Gram(s) IV Push once  dextrose 50% Injectable 12.5 Gram(s) IV Push once  dextrose 50% Injectable 25 Gram(s) IV Push once  everolimus (ZORTRESS) 4 milliGRAM(s) Oral two times a day  glucagon  Injectable 1 milliGRAM(s) IntraMuscular once  heparin   Injectable 5000 Unit(s) SubCutaneous every 8 hours  insulin glargine Injectable (LANTUS) 5 Unit(s) SubCutaneous at bedtime  insulin lispro (ADMELOG) corrective regimen sliding scale   SubCutaneous three times a day before meals  insulin lispro (ADMELOG) corrective regimen sliding scale   SubCutaneous at bedtime  insulin lispro Injectable (ADMELOG) 3 Unit(s) SubCutaneous three times a day before meals  nystatin    Suspension 285801 Unit(s) Oral four times a day  pantoprazole    Tablet 40 milliGRAM(s) Oral two times a day  predniSONE   Tablet 5 milliGRAM(s) Oral daily  trimethoprim   80 mG/sulfamethoxazole 400 mG 1 Tablet(s) Oral daily  valGANciclovir 450 milliGRAM(s) Oral daily    MEDICATIONS  (PRN):  benzocaine 15 mG/menthol 3.6 mG (Sugar-Free) Lozenge 1 Lozenge Oral every 6 hours PRN Sore Throat  benzonatate 100 milliGRAM(s) Oral every 8 hours PRN Cough      CAPILLARY BLOOD GLUCOSE      POCT Blood Glucose.: 337 mg/dL (27 Dec 2021 22:01)  POCT Blood Glucose.: 248 mg/dL (27 Dec 2021 17:28)  POCT Blood Glucose.: 197 mg/dL (27 Dec 2021 11:56)  POCT Blood Glucose.: 167 mg/dL (27 Dec 2021 08:12)    I&O's Summary    27 Dec 2021 07:01  -  28 Dec 2021 07:00  --------------------------------------------------------  IN: 620 mL / OUT: 1700 mL / NET: -1080 mL        PHYSICAL EXAM:  Vital Signs Last 24 Hrs  T(C): 37.2 (28 Dec 2021 05:13), Max: 37.8 (27 Dec 2021 20:32)  T(F): 98.9 (28 Dec 2021 05:13), Max: 100.1 (27 Dec 2021 20:32)  HR: 87 (28 Dec 2021 05:13) (77 - 89)  BP: 124/62 (28 Dec 2021 05:13) (124/62 - 151/69)  BP(mean): --  RR: 20 (28 Dec 2021 05:13) (18 - 20)  SpO2: 94% (28 Dec 2021 05:13) (92% - 95%)    CONSTITUTIONAL: NAD, well-developed  HEENT: normal conjunctiva, PEERLA  RESPIRATORY: Normal respiratory effort; lungs are clear to auscultation bilaterally  CARDIOVASCULAR: Regular rate and rhythm, normal S1 and S2, no murmur/rub/gallop;   ABDOMEN: No tenderness to palpation at all four quadrants, +BS  MUSCULOSKELETAL no clubbing or cyanosis of digits; no joint swelling or tenderness to palpation  EXTREMITIES No lower extremity edema; Peripheral pulses are 2+ bilaterally  SKIN: well-perfused, no dry skin    LABS:                        7.6    2.93  )-----------( 204      ( 27 Dec 2021 06:46 )             24.6     12-27    135  |  104  |  36<H>  ----------------------------<  140<H>  4.2   |  21<L>  |  1.78<H>    Ca    8.3<L>      27 Dec 2021 06:46  Phos  2.6     12-27  Mg     1.9     12-27    TPro  6.0  /  Alb  2.7<L>  /  TBili  0.3  /  DBili  x   /  AST  31  /  ALT  31  /  AlkPhos  201<H>  12-27    PT/INR - ( 27 Dec 2021 06:48 )   PT: 15.1 sec;   INR: 1.27 ratio                     RADIOLOGY & ADDITIONAL TESTS:  Results Reviewed:   Imaging Personally Reviewed:  Electrocardiogram Personally Reviewed:    COORDINATION OF CARE:  Care Discussed with Consultants/Other Providers [Y/N]:  Prior or Outpatient Records Reviewed [Y/N]:

## 2021-12-28 NOTE — PROGRESS NOTE ADULT - ATTENDING COMMENTS
65M, HTN, DM2, obesity, SHENG on noct. 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 after fall in the bathroom after he got up, felt dizzy and weak. Here still orthostatic per physical therapy  - COVID-19 with cough for a few weeks, but several negative PCRs until the one on admission, mild hypoxia sO2 95% on O2 2L/min during the day and CPAP at night; small pulm. infiltrates in lower and upper lung fields, thought more likely to be due to COVID than everolimus pneumonitis  - CKD, creat ~1.8 stable  - immunosuppression with everolimus 4 mg bid, off MMF due to infection, on prednisone 5 mg/d. On valcyte prophylaxis.  - increased AST/ALT/ALP on 12/28. No abdominal pain or tenderness. Last US 12/15: coarsened hepatic echotexture, no focacl lesion. CCY. Extrahepatic bile ducts not visualized. DD: COVID-related liver injury, DILI due to remdesivir or atorvastatin, recection.     -- continue current immunosuppression and prophylaxis with valgancyclovir, bactrim and nystatin  -- get everolimus trough level  -- daily CMP

## 2021-12-28 NOTE — DIETITIAN INITIAL EVALUATION ADULT. - PERSON TAUGHT/METHOD
Reviewed Heart Healthy nutrition therapy and carb counting for DM, though pt declined verbal instruction and preferred to read materials. Briefly verbalized carb sources and heart healthy recommendations. Pt was also educated in the past per prior RD notes./verbal instruction/written material/patient instructed

## 2021-12-28 NOTE — PROGRESS NOTE ADULT - PROBLEM SELECTOR PLAN 2
Cough and sore throat for the past few days. RVP positive for covid at this visit  - Currently in PICU (Covid unit)  - satting well on room air. Desats at night 2/2 SHENG. Refused cpap  - cough drops PRN  - s/p remdesivir (12/20 - 12/25 )  - s/p monoclonal antibody infusion (12/20)  - [facility]

## 2021-12-28 NOTE — DIETITIAN INITIAL EVALUATION ADULT. - ORAL INTAKE PTA/DIET HISTORY
Pt reports variable, decreased PO intake since October when he began to feel ill. When feeling well, pt reports trying to eat healthy, though does use salt on certain foods. Pt with DM, A1c is 10.1%. He states prior to feeling ill two months ago, his BG were controlled, <200mg/dL. Since feeling ill, sugars have been uncontrollable. Pt takes lantus and humalog pre-meal. Weighs himself daily for HF.

## 2021-12-28 NOTE — PROGRESS NOTE ADULT - PROBLEM SELECTOR PLAN 6
Mildly elevated admission AST/ALT at 45/66 respectively. Recent ultrasound showed good graft flow.   - Transplant hepatology recs appreciated

## 2021-12-28 NOTE — DIETITIAN INITIAL EVALUATION ADULT. - CHIEF COMPLAINT
"65 y/o M PMHx DDLT (07/2020, c/b post-op VRE bacteremia), prior ESBL e.coli prostate abscess, R heel OM (12/2020), C. Diff (12/2020), DM, MGUS, and HFpEF, decompensated JEAN cirrhosis status post liver transplant 2020 for JEAN presents status post unwitnessed fall."

## 2021-12-28 NOTE — DIETITIAN NUTRITION RISK NOTIFICATION - TREATMENT: THE FOLLOWING DIET HAS BEEN RECOMMENDED
Diet, Regular:   Consistent Carbohydrate {Evening Snack} (CSTCHOSN)  DASH/TLC {Sodium & Cholesterol Restricted} (DASH) (12-20-21 @ 07:23) [Active]

## 2021-12-28 NOTE — PROVIDER CONTACT NOTE (OTHER) - ASSESSMENT
Patient is A&Ox4 on room air and CPAP at night  As per patient unable to tolerate CPAP mask- patient states it is uncomfortable and does not want to wear it  Respiratory at bedside placed patient on 2L O2
Pt A&O4, dt desats to the 80s when sleeping without CPAP. Pt educated on the necessity to keep CPAP on while sleeping. Pt still refusing to keep CPAP on.
Pt is A&Ox4. Pt is s/p liver tx in 2020. Pt is in no apparent distress, VSS. Pt on 1L NC.
Pt is A&Ox4. VSS. Pt is in no apparent distress, denies chest pain, headache. Pt has not had an everolimus lab result since 12/20/21. Everolimus lab was drawn and sent on 12/27/21, but results are not yet avaliable.
patient is a&ox4 on 2L O2 and telemetry  patient refusing bed time CPAP- patient education provided on the importance of CPAP use while sleeping   respiratory made aware and encouraged to endorse CPAP to patient

## 2021-12-28 NOTE — DIETITIAN INITIAL EVALUATION ADULT. - PERTINENT LABORATORY DATA
12-28 Na130 mmol/L<L> Glu 149 mg/dL<H> K+ 4.3 mmol/L Cr  1.89 mg/dL<H> BUN 34 mg/dL<H> Phos 2.2 mg/dL<L> Alb 2.6 g/dL<L> PAB n/a    A1C with Estimated Average Glucose Result: 10.1 % (12-21-21 @ 16:29)

## 2021-12-28 NOTE — PROGRESS NOTE ADULT - ASSESSMENT
65 y/o M PMHx DDLT (07/2020, c/b post-op VRE bacteremia), prior ESBL e.coli prostate abscess, R heel OM (12/2020), C. 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 65 y/o M PMHx DDLT (07/2020, c/b post-op VRE bacteremia), prior ESBL e.coli prostate abscess, R heel OM (12/2020), C. 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 Currently await rehab placement. Recent CT chest showed possible worsening of COVID PNA,

## 2021-12-28 NOTE — DIETITIAN INITIAL EVALUATION ADULT. - PROBLEM SELECTOR PLAN 6
- Transplant hepatology consulted  - Transplant ID consulted  - prednisone 5  - cellcept 500 q12  - bactrim, valcyte for prophylaxis

## 2021-12-28 NOTE — PROGRESS NOTE ADULT - ASSESSMENT
66 year old male PMH DDLT (07/2020, c/b post-op VRE bacteremia), prior ESBL E. coli prostate abscess, R heel OM (12/2020), C. 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    Found to be COVID19 Positive   U/A (12/20) 261 WBC   CT Chest (12/19) Patchy bilateral nodular and ground glass airspace opacities and Right apical irregular nodular opacity    Patient with persistent sore throat and cough which is largely unchanged since 11/2021.   Of note, patient's son tested positive for COVID19 but around time of last ED visit in mid-November 2021  Patient's COVID19 testing (11/29) and RVP (11/19) was Negative    Even if previous symptoms in 11/2021 were from COVID19 he may now have been exposed to Omicron   He has not mounted a detectable antibody response (as of 11/29/21 spike antibody)  He also now has high grade fevers  s/p Regen-CoV 12/20  s/p 5 days of Remdesivir    #COVID19  s/p Regen-CoV 12/20  s/p 5 days of Remdesivir  --Maintain COVID19 Isolation Precautions (21 days from positive swab, in light of immunocompromised status)  --Recommend Ferritin, CRP, D-Dimer every 48-72H  --Hypoxic to 80's today. Recommend changing prednisone to Dexamethasone 6 mg IV Q24H x 10 days    #Transaminitis  Likely COVID19 induced  --Continue to trend transaminases  --If continued uptrend would check RUQ US    #Fever, Abnormal Urinalysis (but UCx Negative)  Fever likely COVID19 induced (GGO opacities in lungs which may be consistent)  MRSA/MSSA Nasal PCR Positive for MSSA  However, also with new nodular infiltrate on CT Chest in RUL  --s/p 5 days of Ceftriaxone    #Abnormal CT Chest (GGO and Irregular Nodular RUL Infiltrate)  Reviewed imaging with radiology today (12/21)  Unclear if GGO changes are secondary to COVID19 of Everolimus toxicity  Irregular Nodular RUL Infiltrate may be atypical manifestation of COVID19 but will obtain workup as below  Serum Cryptococcal Ag Negative  Fungitell <31  Aspergillus galactomannan negative  --Repeat imaging in ~1 month    I will continue to follow. Please feel free to contact me with any further questions.    Eusebio Pérez M.D.  [facility] Division of Infectious Disease  8AM-5PM: Pager Number [phone]  After Hours (or if no response): Please contact the Infectious Diseases Office at [phone]     The above assessment and plan were discussed with Dr Beard (medicine) and Dr Rose (Transplant Hepatology)

## 2021-12-28 NOTE — PROGRESS NOTE ADULT - PROBLEM SELECTOR PLAN 5
H/H slowly downtrending. Per nursing, patient had "dark brown" stools. Unfortunately sample was not saved so could not assess. Previous endoscopy was done roughly 1 year ago for melena and was shown to have varices and bleeding. Stable. No additional melena  - Blood consent in chart  - Active type and screen  - trend CBC  - pRBC PRN when hgb less than 7  - Gi prophlyaxis: protonix 40 q12   - FOBT positive  - Hep not interested in scoping currently

## 2021-12-28 NOTE — PROGRESS NOTE ADULT - SUBJECTIVE AND OBJECTIVE BOX
Follow Up:  COVID19    Interval History: desaturation to low 80's on room air. afebrile.     REVIEW OF SYSTEMS  [  ] ROS unobtainable because:    [ x ] All other systems negative except as noted below    Constitutional:  [ ] fever [ ] chills  [ ] weight loss  [ ] weakness  Skin:  [ ] rash [ ] phlebitis	  Eyes: [ ] icterus [ ] pain  [ ] discharge	  ENMT: [ ] sore throat  [ ] thrush [ ] ulcers [ ] exudates  Respiratory: [ x] dyspnea [ ] hemoptysis [ x] cough [ ] sputum	  Cardiovascular:  [ ] chest pain [ ] palpitations [ ] edema	  Gastrointestinal:  [ ] nausea [ ] vomiting [ ] diarrhea [ ] constipation [ ] pain	  Genitourinary:  [ ] dysuria [ ] frequency [ ] hematuria [ ] discharge [ ] flank pain  [ ] incontinence  Musculoskeletal:  [ ] myalgias [ ] arthralgias [ ] arthritis  [ ] back pain  Neurological:  [ ] headache [ ] seizures  [ ] confusion/altered mental status    Allergies  codeine (Anaphylaxis)        ANTIMICROBIALS:  nystatin    Suspension 544227 four times a day  trimethoprim   80 mG/sulfamethoxazole 400 mG 1 daily  valGANciclovir 450 daily      OTHER MEDS:  MEDICATIONS  (STANDING):  atorvastatin 40 at bedtime  benzonatate 100 every 8 hours PRN  dexAMETHasone     Tablet 6 daily  dextrose 40% Gel 15 once  dextrose 50% Injectable 25 once  dextrose 50% Injectable 12.5 once  dextrose 50% Injectable 25 once  everolimus (ZORTRESS) 4 two times a day  glucagon  Injectable 1 once  heparin   Injectable 5000 every 8 hours  insulin glargine Injectable (LANTUS) 5 at bedtime  insulin lispro (ADMELOG) corrective regimen sliding scale  three times a day before meals  insulin lispro (ADMELOG) corrective regimen sliding scale  at bedtime  insulin lispro Injectable (ADMELOG) 3 three times a day before meals  pantoprazole    Tablet 40 two times a day      Vital Signs Last 24 Hrs  T(C): 37.1 (28 Dec 2021 11:10), Max: 37.8 (27 Dec 2021 20:32)  T(F): 98.7 (28 Dec 2021 11:10), Max: 100.1 (27 Dec 2021 20:32)  HR: 84 (28 Dec 2021 11:10) (84 - 89)  BP: 123/71 (28 Dec 2021 11:10) (123/71 - 151/69)  BP(mean): --  RR: 18 (28 Dec 2021 11:10) (18 - 20)  SpO2: 92% (28 Dec 2021 11:10) (92% - 95%)    PHYSICAL EXAMINATION:    General: Alert and Awake, NAD  Cardiac: RRR, No M/R/G  Resp: CTAB, No Wh/Rh/Ra  Abdomen: NBS, NT/ND, No HSM, No rigidity or guarding  MSK: No LE edema. No Calf tenderness  : No bob  Skin: No rashes or lesions. Skin is warm and dry to the touch.   Neuro: Alert and Awake. CN 2-12 Grossly intact. Moves all four extremities spontaneously.  Psych: Calm, Pleasant, Cooperative                          7.6    2.85  )-----------( 221      ( 28 Dec 2021 06:57 )             24.6       12-28    130<L>  |  99  |  34<H>  ----------------------------<  149<H>  4.3   |  20<L>  |  1.89<H>    Ca    8.3<L>      28 Dec 2021 06:57  Phos  2.2     12-28  Mg     1.9     12-28    TPro  6.3  /  Alb  2.6<L>  /  TBili  0.3  /  DBili  x   /  AST  160<H>  /  ALT  105<H>  /  AlkPhos  339<H>  12-28          MICROBIOLOGY:    Clean Catch Clean Catch (Midstream)  12-20-21   <10,000 CFU/mL Normal Urogenital Shantel  --  --      .Blood Blood-Peripheral  12-20-21   No Growth Final  --  --      .Blood Blood-Peripheral  12-20-21   No Growth Final  --  --    RADIOLOGY:    <The imaging below has been reviewed and visualized by me independently. Findings as detailed in report below>    < from: CT Chest No Cont (12.28.21 @ 11:31) >  IMPRESSION:    Worsening COVID-19 pneumonia    < end of copied text >

## 2021-12-28 NOTE — PROGRESS NOTE ADULT - ATTENDING COMMENTS
Patient feeling well today. He gave me a thumbs up. He's mildly hypoxic, but ever so transiently. CT chest performed and reviewed by me. He has worsening GGO that are bilateral (but predominantly on left) and peripheral in nature. We were initially considering Everolimus induced pneumonitis, but distribution seems more in line with COVID (possibly?). Everolimus level done on this admission noted (3s). I will touch base with hepatology regarding these CT findings. Prior pulmonary consult stated unable to determine radiographically between the two and bronchoscopy would not add any new information. Thus, seems like we will have to make a clinical decision regarding his medications.     We are still working on disposition. If it is related to COVID unsure if there is anything modifiable I can do while he's here. Will be diligent with monitoring his O2 requirements. DDimer in the 800s, maintain DVT PPx for now. Low threshold for full dose should there be concern for rapidly escalation O2 requirements and PE.

## 2021-12-28 NOTE — DIETITIAN INITIAL EVALUATION ADULT. - PROBLEM SELECTOR PLAN 4
CT chest concerning for possible beginnings of multifocal pneumonia. May be the beginnings of covid pneumonia vs everolimus side effect  - See above (coronavirus)  - Pulm consult: are lung findings secondary to covid or everolimus side effect?  - Will CTM for now

## 2021-12-28 NOTE — PROGRESS NOTE ADULT - PROBLEM SELECTOR PLAN 1
Unclear etiology of the fall. Patient denies loss of consciousness. Low suspicion of a cardiogenic cause with unremarkable trops and EKG. Denies mechanical causes. Patient said that he was "out of it" when he fell but did not lose consciousness. He also said that he urinated on the floor but claims this was intentional. Neurologic cause?   - Neurology following  - Orthostatic blood pressures were positive for orthostatic hypotension  - TTE unremarkable  - c/w telemetry  - Infectious work up: Sputum culture, urine culture, blood cultures so far NGTD  - Pt consul-> rehab Unclear etiology of the fall. Patient denies loss of consciousness. Low suspicion of a cardiogenic cause with unremarkable trops and EKG. Denies mechanical causes. Patient said that he was "out of it" when he fell but did not lose consciousness. He also said that he urinated on the floor but claims this was intentional. Neurologic cause?   - Neurology following  - Orthostatic blood pressures were positive for orthostatic hypotension  - TTE unremarkable  - c/w telemetry  - Infectious work up: Sputum culture, urine culture, blood cultures so far NGTD  - Pt consul-> rehab, likely 12/30.

## 2021-12-28 NOTE — DIETITIAN INITIAL EVALUATION ADULT. - PERTINENT MEDS FT
MEDICATIONS  (STANDING):  atorvastatin 40 milliGRAM(s) Oral at bedtime  dextrose 40% Gel 15 Gram(s) Oral once  dextrose 5%. 1000 milliLiter(s) (50 mL/Hr) IV Continuous <Continuous>  dextrose 5%. 1000 milliLiter(s) (100 mL/Hr) IV Continuous <Continuous>  dextrose 50% Injectable 25 Gram(s) IV Push once  dextrose 50% Injectable 12.5 Gram(s) IV Push once  dextrose 50% Injectable 25 Gram(s) IV Push once  everolimus (ZORTRESS) 4 milliGRAM(s) Oral two times a day  glucagon  Injectable 1 milliGRAM(s) IntraMuscular once  heparin   Injectable 5000 Unit(s) SubCutaneous every 8 hours  insulin glargine Injectable (LANTUS) 5 Unit(s) SubCutaneous at bedtime  insulin lispro (ADMELOG) corrective regimen sliding scale   SubCutaneous three times a day before meals  insulin lispro (ADMELOG) corrective regimen sliding scale   SubCutaneous at bedtime  insulin lispro Injectable (ADMELOG) 3 Unit(s) SubCutaneous three times a day before meals  nystatin    Suspension 083353 Unit(s) Oral four times a day  pantoprazole    Tablet 40 milliGRAM(s) Oral two times a day  predniSONE   Tablet 5 milliGRAM(s) Oral daily  trimethoprim   80 mG/sulfamethoxazole 400 mG 1 Tablet(s) Oral daily  valGANciclovir 450 milliGRAM(s) Oral daily

## 2021-12-28 NOTE — DIETITIAN INITIAL EVALUATION ADULT. - PROBLEM SELECTOR PLAN 1
Unclear etiology of the fall. Patient denies loss of consciousness. Low suspicion of a cardiogenic cause with unremarkable trops and EKG. Denies mechanical causes. Patient said that he was "out of it" when he fell but did not lose consciousness. He also said that he urinated on the floor but claims this was intentional. Neurologic cause?   - Neurology following  - Orthostatic blood pressures were positive for orthostatic hypotension  - EEG  - TTE  - Telemetry  - Infectious work up: Sputum culture, urine culture, blood cultures  - Pt consult

## 2021-12-28 NOTE — DIETITIAN INITIAL EVALUATION ADULT. - OTHER INFO
This admission: Reports fair PO intake 2/2 dislike of food. 50-75% intake of meals per flow sheets. Requests salt packets with breakfast for his hard boiled eggs. Explained Heart Healthy diet to patient, [facility] diet. Pt reports he would eat more if he received salt at breakfast. Will accommodate patient, pt willing to continue on Heart Healthy diet for lunch and dinner. Dining services phone number provided- pt will order his meals to help increase PO intake. Declined offer for glucerna.     Confirms NKFA, no nausea/vomiting, no difficulty swallowing, some difficulty chewing certain foods 2/2 poor dentition. Pt reports he is able to select foods he can chew to remain on regular texture diet for more food choices  No GI distress, Vitamin D3, B, C, iron, omega-3 and folic acid micronutrient supplementation PTA, last BM 12/26    Weight Hx: Reports intentional wt loss x2 years. Last year 12/2020 pt was 249.5 lbs per prior RD note (12/17/20). This admission, pt is 210 lbs dosing wt (12/19/21). Endorses both intentional wt loss and unintentional wt loss when feeling ill x2 months.

## 2021-12-28 NOTE — PROVIDER CONTACT NOTE (OTHER) - RECOMMENDATIONS
MD notified and made aware
MD notified and made aware
Notify MD. Hold medication until everolimus labs result?
Notify provider and assess pt at bedside.
Notify MD. Hold medication until labs are drawn in the AM?

## 2021-12-28 NOTE — DIETITIAN INITIAL EVALUATION ADULT. - PROBLEM SELECTOR PLAN 8
Home insulin regimen 12 lantus and 10 humalog premeal  - Will start 12 lantus and 7 admelog premeal for now  - SSI  - CTM

## 2021-12-29 LAB
ALBUMIN SERPL ELPH-MCNC: 2.9 G/DL — LOW (ref 3.3–5)
ALBUMIN SERPL ELPH-MCNC: 2.9 G/DL — LOW (ref 3.3–5)
ALP SERPL-CCNC: 444 U/L — HIGH (ref 40–120)
ALP SERPL-CCNC: 444 U/L — HIGH (ref 40–120)
ALT FLD-CCNC: 142 U/L — HIGH (ref 10–45)
ALT FLD-CCNC: 142 U/L — HIGH (ref 10–45)
ANION GAP SERPL CALC-SCNC: 13 MMOL/L — SIGNIFICANT CHANGE UP (ref 5–17)
AST SERPL-CCNC: 116 U/L — HIGH (ref 10–40)
AST SERPL-CCNC: 116 U/L — HIGH (ref 10–40)
BILIRUB DIRECT SERPL-MCNC: 0.1 MG/DL — SIGNIFICANT CHANGE UP (ref 0–0.3)
BILIRUB INDIRECT FLD-MCNC: 0.2 MG/DL — SIGNIFICANT CHANGE UP (ref 0.2–1)
BILIRUB SERPL-MCNC: 0.3 MG/DL — SIGNIFICANT CHANGE UP (ref 0.2–1.2)
BILIRUB SERPL-MCNC: 0.3 MG/DL — SIGNIFICANT CHANGE UP (ref 0.2–1.2)
BUN SERPL-MCNC: 33 MG/DL — HIGH (ref 7–23)
CALCIUM SERPL-MCNC: 9.1 MG/DL — SIGNIFICANT CHANGE UP (ref 8.4–10.5)
CHLORIDE SERPL-SCNC: 104 MMOL/L — SIGNIFICANT CHANGE UP (ref 96–108)
CO2 SERPL-SCNC: 19 MMOL/L — LOW (ref 22–31)
CREAT SERPL-MCNC: 1.88 MG/DL — HIGH (ref 0.5–1.3)
CREAT SERPL-MCNC: 1.88 MG/DL — HIGH (ref 0.5–1.3)
GLUCOSE BLDC GLUCOMTR-MCNC: 216 MG/DL — HIGH (ref 70–99)
GLUCOSE BLDC GLUCOMTR-MCNC: 326 MG/DL — HIGH (ref 70–99)
GLUCOSE BLDC GLUCOMTR-MCNC: 385 MG/DL — HIGH (ref 70–99)
GLUCOSE BLDC GLUCOMTR-MCNC: 392 MG/DL — HIGH (ref 70–99)
GLUCOSE BLDC GLUCOMTR-MCNC: 399 MG/DL — HIGH (ref 70–99)
GLUCOSE SERPL-MCNC: 188 MG/DL — HIGH (ref 70–99)
HCT VFR BLD CALC: 24.2 % — LOW (ref 39–50)
HGB BLD-MCNC: 7.5 G/DL — LOW (ref 13–17)
INR BLD: 1.18 RATIO — HIGH (ref 0.88–1.16)
MCHC RBC-ENTMCNC: 26 PG — LOW (ref 27–34)
MCHC RBC-ENTMCNC: 31 GM/DL — LOW (ref 32–36)
MCV RBC AUTO: 84 FL — SIGNIFICANT CHANGE UP (ref 80–100)
NRBC # BLD: 0 /100 WBCS — SIGNIFICANT CHANGE UP (ref 0–0)
PLATELET # BLD AUTO: 204 K/UL — SIGNIFICANT CHANGE UP (ref 150–400)
POTASSIUM SERPL-MCNC: 4.3 MMOL/L — SIGNIFICANT CHANGE UP (ref 3.5–5.3)
POTASSIUM SERPL-SCNC: 4.3 MMOL/L — SIGNIFICANT CHANGE UP (ref 3.5–5.3)
PROT SERPL-MCNC: 6.4 G/DL — SIGNIFICANT CHANGE UP (ref 6–8.3)
PROT SERPL-MCNC: 6.4 G/DL — SIGNIFICANT CHANGE UP (ref 6–8.3)
PROTHROM AB SERPL-ACNC: 14.1 SEC — HIGH (ref 10.6–13.6)
RBC # BLD: 2.88 M/UL — LOW (ref 4.2–5.8)
RBC # FLD: 15.6 % — HIGH (ref 10.3–14.5)
SODIUM SERPL-SCNC: 136 MMOL/L — SIGNIFICANT CHANGE UP (ref 135–145)
WBC # BLD: 2.37 K/UL — LOW (ref 3.8–10.5)
WBC # FLD AUTO: 2.37 K/UL — LOW (ref 3.8–10.5)

## 2021-12-29 PROCEDURE — 99232 SBSQ HOSP IP/OBS MODERATE 35: CPT | Mod: GC

## 2021-12-29 PROCEDURE — 99233 SBSQ HOSP IP/OBS HIGH 50: CPT | Mod: GC

## 2021-12-29 PROCEDURE — 93976 VASCULAR STUDY: CPT | Mod: 26

## 2021-12-29 PROCEDURE — 76705 ECHO EXAM OF ABDOMEN: CPT | Mod: 26,59

## 2021-12-29 RX ORDER — INSULIN GLARGINE 100 [IU]/ML
12 INJECTION, SOLUTION SUBCUTANEOUS AT BEDTIME
Refills: 0 | Status: DISCONTINUED | OUTPATIENT
Start: 2021-12-29 | End: 2021-12-30

## 2021-12-29 RX ORDER — INSULIN LISPRO 100/ML
6 VIAL (ML) SUBCUTANEOUS
Refills: 0 | Status: DISCONTINUED | OUTPATIENT
Start: 2021-12-29 | End: 2021-12-30

## 2021-12-29 RX ADMIN — Medication 500000 UNIT(S): at 17:30

## 2021-12-29 RX ADMIN — Medication 6 UNIT(S): at 16:14

## 2021-12-29 RX ADMIN — Medication 6 UNIT(S): at 09:00

## 2021-12-29 RX ADMIN — INSULIN GLARGINE 12 UNIT(S): 100 INJECTION, SOLUTION SUBCUTANEOUS at 21:23

## 2021-12-29 RX ADMIN — Medication 4: at 11:47

## 2021-12-29 RX ADMIN — Medication 500000 UNIT(S): at 11:48

## 2021-12-29 RX ADMIN — PANTOPRAZOLE SODIUM 40 MILLIGRAM(S): 20 TABLET, DELAYED RELEASE ORAL at 05:55

## 2021-12-29 RX ADMIN — HEPARIN SODIUM 5000 UNIT(S): 5000 INJECTION INTRAVENOUS; SUBCUTANEOUS at 05:54

## 2021-12-29 RX ADMIN — Medication 500000 UNIT(S): at 00:59

## 2021-12-29 RX ADMIN — HEPARIN SODIUM 5000 UNIT(S): 5000 INJECTION INTRAVENOUS; SUBCUTANEOUS at 21:53

## 2021-12-29 RX ADMIN — Medication 2: at 08:57

## 2021-12-29 RX ADMIN — Medication 5: at 16:14

## 2021-12-29 RX ADMIN — HEPARIN SODIUM 5000 UNIT(S): 5000 INJECTION INTRAVENOUS; SUBCUTANEOUS at 15:38

## 2021-12-29 RX ADMIN — Medication 6 UNIT(S): at 11:47

## 2021-12-29 RX ADMIN — ATORVASTATIN CALCIUM 40 MILLIGRAM(S): 80 TABLET, FILM COATED ORAL at 21:29

## 2021-12-29 RX ADMIN — Medication 500000 UNIT(S): at 05:54

## 2021-12-29 RX ADMIN — Medication 6 MILLIGRAM(S): at 05:54

## 2021-12-29 RX ADMIN — VALGANCICLOVIR 450 MILLIGRAM(S): 450 TABLET, FILM COATED ORAL at 11:48

## 2021-12-29 RX ADMIN — PANTOPRAZOLE SODIUM 40 MILLIGRAM(S): 20 TABLET, DELAYED RELEASE ORAL at 17:31

## 2021-12-29 RX ADMIN — EVEROLIMUS 4 MILLIGRAM(S): 10 TABLET ORAL at 05:56

## 2021-12-29 RX ADMIN — Medication 3: at 21:25

## 2021-12-29 RX ADMIN — Medication 1 TABLET(S): at 11:48

## 2021-12-29 RX ADMIN — EVEROLIMUS 4 MILLIGRAM(S): 10 TABLET ORAL at 17:32

## 2021-12-29 NOTE — PROGRESS NOTE ADULT - ASSESSMENT
67 y/o M PMHx DDLT (07/2020, c/b post-op VRE bacteremia), prior ESBL e.coli prostate abscess, R heel OM (12/2020), C. 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 Currently await rehab placement. Recent CT chest showed possible worsening of COVID PNA,  67 y/o M PMHx DDLT (07/2020, c/b post-op VRE bacteremia), prior ESBL e.coli prostate abscess, R heel OM (12/2020), C. 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 Currently await rehab placement. Recent CT chest showed possible worsening of COVID PNA, currently on solumedrol.

## 2021-12-29 NOTE — PROGRESS NOTE ADULT - PROBLEM SELECTOR PLAN 6
Mildly elevated admission AST/ALT at 45/66 respectively. Recent ultrasound showed good graft flow.   - Transplant hepatology recs appreciated Mildly elevated admission AST/ALT at 45/66 respectively. Recent ultrasound showed good graft flow.   - Transplant hepatology recs appreciated  - RUQ US

## 2021-12-29 NOTE — PROGRESS NOTE ADULT - ATTENDING COMMENTS
65M, HTN, DM2, obesity, SHENG on noct. 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 after fall in the bathroom after he got up, felt dizzy and weak. Here still orthostatic per physical therapy  - COVID-19 with cough for a few weeks, but several negative PCRs until the one on admission, mild hypoxia sO2 95% on O2 2L/min during the day and CPAP at night; small pulm. infiltrates in lower and upper lung fields, thought more likely to be due to COVID than everolimus pneumonitis  - CKD, creat ~1.8 stable  - immunosuppression with everolimus 4 mg bid, off MMF due to infection, on prednisone 5 mg/d. On valcyte prophylaxis.  - increased AST/ALT/ALP on 12/28 - AST improved today, ALP and ALT higher. No abdominal pain or tenderness. Last US 12/15: coarsened hepatic echotexture, no focal lesion. CCY. Extrahepatic bile ducts not visualized. DD: COVID-related liver injury, DILI due to remdesivir or atorvastatin, rejection.     -- continue current immunosuppression and prophylaxis with valgancyclovir, bactrim and nystatin  -- f/u everolimus trough level  -- daily CMP

## 2021-12-29 NOTE — PROGRESS NOTE ADULT - SUBJECTIVE AND OBJECTIVE BOX
Oral Schultz, PGY-1  Internal Medicine  Pager: [phone]    PROGRESS NOTE:     Patient is a 66y old  Male who presents with a chief complaint of Fall (28 Dec 2021 14:10)      SUBJECTIVE / OVERNIGHT EVENTS:    ADDITIONAL REVIEW OF SYSTEMS:    MEDICATIONS  (STANDING):  atorvastatin 40 milliGRAM(s) Oral at bedtime  dexAMETHasone     Tablet 6 milliGRAM(s) Oral daily  dextrose 40% Gel 15 Gram(s) Oral once  dextrose 5%. 1000 milliLiter(s) (50 mL/Hr) IV Continuous <Continuous>  dextrose 5%. 1000 milliLiter(s) (100 mL/Hr) IV Continuous <Continuous>  dextrose 50% Injectable 25 Gram(s) IV Push once  dextrose 50% Injectable 12.5 Gram(s) IV Push once  dextrose 50% Injectable 25 Gram(s) IV Push once  everolimus (ZORTRESS) 4 milliGRAM(s) Oral two times a day  glucagon  Injectable 1 milliGRAM(s) IntraMuscular once  heparin   Injectable 5000 Unit(s) SubCutaneous every 8 hours  insulin glargine Injectable (LANTUS) 5 Unit(s) SubCutaneous at bedtime  insulin lispro (ADMELOG) corrective regimen sliding scale   SubCutaneous three times a day before meals  insulin lispro (ADMELOG) corrective regimen sliding scale   SubCutaneous at bedtime  insulin lispro Injectable (ADMELOG) 3 Unit(s) SubCutaneous three times a day before meals  nystatin    Suspension 296985 Unit(s) Oral four times a day  pantoprazole    Tablet 40 milliGRAM(s) Oral two times a day  trimethoprim   80 mG/sulfamethoxazole 400 mG 1 Tablet(s) Oral daily  valGANciclovir 450 milliGRAM(s) Oral daily    MEDICATIONS  (PRN):  benzocaine 15 mG/menthol 3.6 mG (Sugar-Free) Lozenge 1 Lozenge Oral every 6 hours PRN Sore Throat  benzonatate 100 milliGRAM(s) Oral every 8 hours PRN Cough      CAPILLARY BLOOD GLUCOSE      POCT Blood Glucose.: 252 mg/dL (28 Dec 2021 22:52)  POCT Blood Glucose.: 249 mg/dL (28 Dec 2021 16:52)  POCT Blood Glucose.: 379 mg/dL (28 Dec 2021 13:19)  POCT Blood Glucose.: 311 mg/dL (28 Dec 2021 11:56)  POCT Blood Glucose.: 159 mg/dL (28 Dec 2021 07:50)    I&O's Summary    28 Dec 2021 07:01  -  29 Dec 2021 07:00  --------------------------------------------------------  IN: 480 mL / OUT: 2150 mL / NET: -1670 mL        PHYSICAL EXAM:  Vital Signs Last 24 Hrs  T(C): 36.4 (29 Dec 2021 05:12), Max: 37.1 (28 Dec 2021 11:10)  T(F): 97.5 (29 Dec 2021 05:12), Max: 98.7 (28 Dec 2021 11:10)  HR: 74 (29 Dec 2021 05:12) (72 - 84)  BP: 123/73 (29 Dec 2021 05:12) (119/77 - 123/73)  BP(mean): --  RR: 18 (29 Dec 2021 05:12) (18 - 18)  SpO2: 95% (29 Dec 2021 05:12) (92% - 95%)    CONSTITUTIONAL: NAD, well-developed  HEENT: normal conjunctiva, PEERLA  RESPIRATORY: Normal respiratory effort; lungs are clear to auscultation bilaterally  CARDIOVASCULAR: Regular rate and rhythm, normal S1 and S2, no murmur/rub/gallop;   ABDOMEN: No tenderness to palpation at all four quadrants, +BS  MUSCULOSKELETAL no clubbing or cyanosis of digits; no joint swelling or tenderness to palpation  EXTREMITIES No lower extremity edema; Peripheral pulses are 2+ bilaterally  SKIN: well-perfused, no dry skin    LABS:                        7.5    2.37  )-----------( 204      ( 29 Dec 2021 06:37 )             24.2     12-28    130<L>  |  99  |  34<H>  ----------------------------<  149<H>  4.3   |  20<L>  |  1.89<H>    Ca    8.3<L>      28 Dec 2021 06:57  Phos  2.2     12-28  Mg     1.9     12-28    TPro  6.3  /  Alb  2.6<L>  /  TBili  0.3  /  DBili  x   /  AST  160<H>  /  ALT  105<H>  /  AlkPhos  339<H>  12-28    PT/INR - ( 29 Dec 2021 06:37 )   PT: 14.1 sec;   INR: 1.18 ratio                     RADIOLOGY & ADDITIONAL TESTS:  Results Reviewed:   Imaging Personally Reviewed:  Electrocardiogram Personally Reviewed:    COORDINATION OF CARE:  Care Discussed with Consultants/Other Providers [Y/N]:  Prior or Outpatient Records Reviewed [Y/N]:   Oral Schultz, PGY-1  Internal Medicine  Pager: [phone]    PROGRESS NOTE:     Patient is a 66y old  Male who presents with a chief complaint of Fall (28 Dec 2021 14:10)      SUBJECTIVE / OVERNIGHT EVENTS: Pt has worsening LFTs, and was given solumedrol. Although clinically worse, pt did not feel SOB, dizzy, lightheaded when walking.    ADDITIONAL REVIEW OF SYSTEMS: -ve    MEDICATIONS  (STANDING):  atorvastatin 40 milliGRAM(s) Oral at bedtime  dexAMETHasone     Tablet 6 milliGRAM(s) Oral daily  dextrose 40% Gel 15 Gram(s) Oral once  dextrose 5%. 1000 milliLiter(s) (50 mL/Hr) IV Continuous <Continuous>  dextrose 5%. 1000 milliLiter(s) (100 mL/Hr) IV Continuous <Continuous>  dextrose 50% Injectable 25 Gram(s) IV Push once  dextrose 50% Injectable 12.5 Gram(s) IV Push once  dextrose 50% Injectable 25 Gram(s) IV Push once  everolimus (ZORTRESS) 4 milliGRAM(s) Oral two times a day  glucagon  Injectable 1 milliGRAM(s) IntraMuscular once  heparin   Injectable 5000 Unit(s) SubCutaneous every 8 hours  insulin glargine Injectable (LANTUS) 5 Unit(s) SubCutaneous at bedtime  insulin lispro (ADMELOG) corrective regimen sliding scale   SubCutaneous three times a day before meals  insulin lispro (ADMELOG) corrective regimen sliding scale   SubCutaneous at bedtime  insulin lispro Injectable (ADMELOG) 3 Unit(s) SubCutaneous three times a day before meals  nystatin    Suspension 113224 Unit(s) Oral four times a day  pantoprazole    Tablet 40 milliGRAM(s) Oral two times a day  trimethoprim   80 mG/sulfamethoxazole 400 mG 1 Tablet(s) Oral daily  valGANciclovir 450 milliGRAM(s) Oral daily    MEDICATIONS  (PRN):  benzocaine 15 mG/menthol 3.6 mG (Sugar-Free) Lozenge 1 Lozenge Oral every 6 hours PRN Sore Throat  benzonatate 100 milliGRAM(s) Oral every 8 hours PRN Cough      CAPILLARY BLOOD GLUCOSE      POCT Blood Glucose.: 252 mg/dL (28 Dec 2021 22:52)  POCT Blood Glucose.: 249 mg/dL (28 Dec 2021 16:52)  POCT Blood Glucose.: 379 mg/dL (28 Dec 2021 13:19)  POCT Blood Glucose.: 311 mg/dL (28 Dec 2021 11:56)  POCT Blood Glucose.: 159 mg/dL (28 Dec 2021 07:50)    I&O's Summary    28 Dec 2021 07:01  -  29 Dec 2021 07:00  --------------------------------------------------------  IN: 480 mL / OUT: 2150 mL / NET: -1670 mL        PHYSICAL EXAM:  Vital Signs Last 24 Hrs  T(C): 36.4 (29 Dec 2021 05:12), Max: 37.1 (28 Dec 2021 11:10)  T(F): 97.5 (29 Dec 2021 05:12), Max: 98.7 (28 Dec 2021 11:10)  HR: 74 (29 Dec 2021 05:12) (72 - 84)  BP: 123/73 (29 Dec 2021 05:12) (119/77 - 123/73)  BP(mean): --  RR: 18 (29 Dec 2021 05:12) (18 - 18)  SpO2: 95% (29 Dec 2021 05:12) (92% - 95%)    CONSTITUTIONAL: NAD, well-developed  HEENT: normal conjunctiva, PEERLA  RESPIRATORY: b/l decreased lung sounds  CARDIOVASCULAR: Regular rate and rhythm, normal S1 and S2, no murmur/rub/gallop;   ABDOMEN: No tenderness to palpation at all four quadrants, +BS  MUSCULOSKELETAL no clubbing or cyanosis of digits; no joint swelling or tenderness to palpation  EXTREMITIES No lower extremity edema; Peripheral pulses are 2+ bilaterally  SKIN: well-perfused, no dry skin, pt subjectively complained about tingling/numbness    LABS:                        7.5    2.37  )-----------( 204      ( 29 Dec 2021 06:37 )             24.2     12-28    130<L>  |  99  |  34<H>  ----------------------------<  149<H>  4.3   |  20<L>  |  1.89<H>    Ca    8.3<L>      28 Dec 2021 06:57  Phos  2.2     12-28  Mg     1.9     12-28    TPro  6.3  /  Alb  2.6<L>  /  TBili  0.3  /  DBili  x   /  AST  160<H>  /  ALT  105<H>  /  AlkPhos  339<H>  12-28    PT/INR - ( 29 Dec 2021 06:37 )   PT: 14.1 sec;   INR: 1.18 ratio                     RADIOLOGY & ADDITIONAL TESTS:  Results Reviewed:   Imaging Personally Reviewed:  Electrocardiogram Personally Reviewed:    COORDINATION OF CARE:  Care Discussed with Consultants/Other Providers [Y/N]:  Prior or Outpatient Records Reviewed [Y/N]:

## 2021-12-29 NOTE — PROGRESS NOTE ADULT - PROBLEM SELECTOR PLAN 1
Unclear etiology of the fall. Patient denies loss of consciousness. Low suspicion of a cardiogenic cause with unremarkable trops and EKG. Denies mechanical causes. Patient said that he was "out of it" when he fell but did not lose consciousness. He also said that he urinated on the floor but claims this was intentional. Neurologic cause?   - Neurology following  - Orthostatic blood pressures were positive for orthostatic hypotension  - TTE unremarkable  - c/w telemetry  - Infectious work up: Sputum culture, urine culture, blood cultures so far NGTD  - Pt consul-> rehab, likely 12/30.

## 2021-12-29 NOTE — PROGRESS NOTE ADULT - SUBJECTIVE AND OBJECTIVE BOX
Gastroenterology/Hepatology Progress Note      Interval Events:   - no acute events overnight; back on 2 L NC  - liver enzymes this morning w/ improving AST, stable ALT, rising alk phos    Allergies:  codeine (Anaphylaxis)      Hospital Medications:  atorvastatin 40 milliGRAM(s) Oral at bedtime  benzocaine 15 mG/menthol 3.6 mG (Sugar-Free) Lozenge 1 Lozenge Oral every 6 hours PRN  benzonatate 100 milliGRAM(s) Oral every 8 hours PRN  dexAMETHasone     Tablet 6 milliGRAM(s) Oral daily  dextrose 40% Gel 15 Gram(s) Oral once  dextrose 5%. 1000 milliLiter(s) IV Continuous <Continuous>  dextrose 5%. 1000 milliLiter(s) IV Continuous <Continuous>  dextrose 50% Injectable 25 Gram(s) IV Push once  dextrose 50% Injectable 12.5 Gram(s) IV Push once  dextrose 50% Injectable 25 Gram(s) IV Push once  everolimus (ZORTRESS) 4 milliGRAM(s) Oral two times a day  glucagon  Injectable 1 milliGRAM(s) IntraMuscular once  heparin   Injectable 5000 Unit(s) SubCutaneous every 8 hours  insulin glargine Injectable (LANTUS) 12 Unit(s) SubCutaneous at bedtime  insulin lispro (ADMELOG) corrective regimen sliding scale   SubCutaneous three times a day before meals  insulin lispro (ADMELOG) corrective regimen sliding scale   SubCutaneous at bedtime  insulin lispro Injectable (ADMELOG) 6 Unit(s) SubCutaneous three times a day before meals  nystatin    Suspension 296983 Unit(s) Oral four times a day  pantoprazole    Tablet 40 milliGRAM(s) Oral two times a day  trimethoprim   80 mG/sulfamethoxazole 400 mG 1 Tablet(s) Oral daily  valGANciclovir 450 milliGRAM(s) Oral daily        PHYSICAL EXAM:   Vital Signs:  Vital Signs Last 24 Hrs  T(C): 36.3 (29 Dec 2021 15:47), Max: 36.7 (28 Dec 2021 22:08)  T(F): 97.4 (29 Dec 2021 15:47), Max: 98.1 (28 Dec 2021 22:08)  HR: 82 (29 Dec 2021 15:47) (72 - 88)  BP: 133/72 (29 Dec 2021 15:47) (119/77 - 133/72)  BP(mean): --  RR: 18 (29 Dec 2021 15:47) (18 - 21)  SpO2: 97% (29 Dec 2021 15:47) (93% - 97%)  Daily     Daily     GENERAL:  No acute distress  HEENT:  NCAT, no scleral icterus  CHEST: no resp distress on RA  HEART:  RRR  ABDOMEN:  surgical site c/d/i, nontender, no r/g  EXTREMITIES:  No cyanosis, clubbing, or edema  SKIN:  No rash/erythema/ecchymoses/petechiae/wounds/abscess/warm/dry  NEURO:  Alert and oriented x 3, no asterixis, no tremor      LABS:                        7.5    2.37  )-----------( 204      ( 29 Dec 2021 06:37 )             24.2     Mean Cell Volume: 84.0 fl (12-29-21 @ 06:37)    12-29    136  |  104  |  33<H>  ----------------------------<  188<H>  4.3   |  19<L>  |  1.88<H>    Ca    9.1      29 Dec 2021 07:42  Phos  2.2     12-28  Mg     1.9     12-28    TPro  6.4  /  Alb  2.9<L>  /  TBili  0.3  /  DBili  0.1  /  AST  116<H>  /  ALT  142<H>  /  AlkPhos  444<H>  12-29    LIVER FUNCTIONS - ( 29 Dec 2021 07:42 )  Alb: 2.9 g/dL / Pro: 6.4 g/dL / ALK PHOS: 444 U/L / ALT: 142 U/L / AST: 116 U/L / GGT: x           PT/INR - ( 29 Dec 2021 06:37 )   PT: 14.1 sec;   INR: 1.18 ratio                   Imaging:

## 2021-12-29 NOTE — PROGRESS NOTE ADULT - PROBLEM SELECTOR PLAN 3
CT chest concerning for possible beginnings of multifocal pneumonia. May be the beginnings of covid pneumonia vs everolimus side effect  - See above (coronavirus)  - Pulm consult: are lung findings secondary to covid or everolimus side effect? Unable to be determined by imaging  - Empiric antibiotics for five days (ceftriaxone 12/21 - 12/25) per ID  - CT chest showed possibly worsening COVID PNA

## 2021-12-29 NOTE — PROGRESS NOTE ADULT - PROBLEM SELECTOR PLAN 2
Cough and sore throat for the past few days. RVP positive for covid at this visit  - Currently in PICU (Covid unit)  - satting well on room air. Desats at night 2/2 SHENG. Refused cpap  - cough drops PRN  - s/p remdesivir (12/20 - 12/25 )  - s/p monoclonal antibody infusion (12/20)  - [facility] Cough and sore throat for the past few days. RVP positive for covid at this visit  - Currently in PICU (Covid unit)  - satting well on room air. Desats at night 2/2 SHENG. Refused cpap  - cough drops PRN  - s/p remdesivir (12/20 - 12/25 )  - s/p monoclonal antibody infusion (12/20)  - CTM  - c/w solumedrol 10d

## 2021-12-29 NOTE — PROGRESS NOTE ADULT - ASSESSMENT
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 (course c/b VRE bacteremia, CNI toxicity to both tacro and cyclosporine; switched to everolimus 7/27/20), HLD, obesity, HFpEF presenting w/ fall    # elevated liver enzymes - newly elevated AST/ALT 12/28/21, unclear etiology, no abd pain and mostly hepatocellular injury (unlikely stone disease), DILI (2/2 remdesivir) possible, rejection (while holding MMF) possible, COVID induced ischemic injury possible (though lower levels than expected), will monitor for now  # fall - likely 2/2 pulmonary symptoms; trop negative, TTE (poor study) negative, no events on tele, no EEG, no brain MRI, plan for outpt follow up  # pulmonary symptoms - patient w/ 1+ month of upper respiratory symptoms, of cough. Presented to ED 11/19/21 with negative workup; this current presentation now found to be COVID positive w/ CT chest showing patchy groundglass opacities. DDx for symptoms include COVID (though had symptoms of cough 1 month ago) vs everolimus pneumonitis. 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 Patient was seen by pulmonary, unable to differentiate between everolimus pneumonitis and COVID. Case was reviewed with radiology - believes that CT findings more consistent with COVID (patchy ground glass infiltrates) and not everolimus pneumonitis (no fibrosis). Would recommend treating for COVID (remdesivir) and continuing current immunosuppression with plan to re-image in a short interval. If patient's respiratory symptoms worsen, will readdress role of holding everolimus and possibly higher doses of steroids for pneumonitis.     # DM - poorly controlled DM, A1c 10.1    # anemia - patient w/ acute on chronic anemia, had dark stools, though unclear if melena (green around edges) and on iron; will continue w/ supportive management, iron supplementation, transfusions prn, no plans for endoscopic evaluation at this time    Recommendations:  - f/u everolimus level  - c/w everolimus 4mg q12h  - hold cellcept   - ok to continue with prednisone 5mg qd  - s/p remdesivir   - please only provide supplemental oxygen if patient actually requires it; patient w/ SHENG and often while sleeping will desat  - pantropazole 40mg PO BID  - no plans for endoscopic evaluation   - glycemic control (may benefit from endocrine consult; patient does not currently follow w/ endo)  - trend CMP, INR  - rest of care per primary team    Hepatology will continue to follow.     Charles Salgado, PGY5  Gastroenterology/Hepatology Fellow  Available on Microsoft Teams  [phone] (C[employer] Dragon Short Range Pager)  [phone] (Long Range Pager)    After 5pm, please contact the on-call GI fellow. [phone]

## 2021-12-29 NOTE — PROGRESS NOTE ADULT - ATTENDING COMMENTS
Patient seen and examined at bedside. No acute events overnight. He feels well and has no perceived hypoxia. I spoke with transplant hepatology and transplant ID yesterday. Will start Dexamethasone 6 mg daily for 10 days in lieu of home prednisone given possible late sequelae of COVID as seen on CT Chest. Patient requires oxygen, will ambulate & monitor to see if he is stable on current liters. Transaminitis is unchanged with normal T. Bili. Will pursue RUQ sono. Need to continue to monitor LFTs. Will concurrently work on placing to [facility] as it may take some time given his COVID status. Will touch base with hepatology.

## 2021-12-30 ENCOUNTER — RESULT REVIEW (OUTPATIENT)
Age: 66
End: 2021-12-30

## 2021-12-30 LAB
ALBUMIN SERPL ELPH-MCNC: 3 G/DL — LOW (ref 3.3–5)
ALP SERPL-CCNC: 491 U/L — HIGH (ref 40–120)
ALT FLD-CCNC: 190 U/L — HIGH (ref 10–45)
ANION GAP SERPL CALC-SCNC: 15 MMOL/L — SIGNIFICANT CHANGE UP (ref 5–17)
APTT BLD: 31.5 SEC — SIGNIFICANT CHANGE UP (ref 27.5–35.5)
AST SERPL-CCNC: 129 U/L — HIGH (ref 10–40)
BASOPHILS # BLD AUTO: 0 K/UL — SIGNIFICANT CHANGE UP (ref 0–0.2)
BASOPHILS NFR BLD AUTO: 0 % — SIGNIFICANT CHANGE UP (ref 0–2)
BILIRUB SERPL-MCNC: 0.2 MG/DL — SIGNIFICANT CHANGE UP (ref 0.2–1.2)
BLD GP AB SCN SERPL QL: NEGATIVE — SIGNIFICANT CHANGE UP
BUN SERPL-MCNC: 36 MG/DL — HIGH (ref 7–23)
CALCIUM SERPL-MCNC: 9 MG/DL — SIGNIFICANT CHANGE UP (ref 8.4–10.5)
CHLORIDE SERPL-SCNC: 99 MMOL/L — SIGNIFICANT CHANGE UP (ref 96–108)
CO2 SERPL-SCNC: 18 MMOL/L — LOW (ref 22–31)
CREAT SERPL-MCNC: 1.84 MG/DL — HIGH (ref 0.5–1.3)
CRP SERPL-MCNC: 72 MG/L — HIGH (ref 0–4)
D DIMER BLD IA.RAPID-MCNC: 987 NG/ML DDU — HIGH
DACRYOCYTES BLD QL SMEAR: SLIGHT — SIGNIFICANT CHANGE UP
ELLIPTOCYTES BLD QL SMEAR: SLIGHT — SIGNIFICANT CHANGE UP
EOSINOPHIL # BLD AUTO: 0 K/UL — SIGNIFICANT CHANGE UP (ref 0–0.5)
EOSINOPHIL NFR BLD AUTO: 0 % — SIGNIFICANT CHANGE UP (ref 0–6)
EVEROLIMUS, WHOLE BLOOD RESULT: 14.5 NG/ML — HIGH (ref 3–8)
GLUCOSE BLDC GLUCOMTR-MCNC: 249 MG/DL — HIGH (ref 70–99)
GLUCOSE BLDC GLUCOMTR-MCNC: 315 MG/DL — HIGH (ref 70–99)
GLUCOSE BLDC GLUCOMTR-MCNC: 332 MG/DL — HIGH (ref 70–99)
GLUCOSE BLDC GLUCOMTR-MCNC: 342 MG/DL — HIGH (ref 70–99)
GLUCOSE SERPL-MCNC: 296 MG/DL — HIGH (ref 70–99)
HCT VFR BLD CALC: 22.7 % — LOW (ref 39–50)
HGB BLD-MCNC: 7.1 G/DL — LOW (ref 13–17)
INR BLD: 1.08 RATIO — SIGNIFICANT CHANGE UP (ref 0.88–1.16)
LYMPHOCYTES # BLD AUTO: 0.3 K/UL — LOW (ref 1–3.3)
LYMPHOCYTES # BLD AUTO: 11.6 % — LOW (ref 13–44)
MAGNESIUM SERPL-MCNC: 2.2 MG/DL — SIGNIFICANT CHANGE UP (ref 1.6–2.6)
MANUAL SMEAR VERIFICATION: SIGNIFICANT CHANGE UP
MCHC RBC-ENTMCNC: 26.1 PG — LOW (ref 27–34)
MCHC RBC-ENTMCNC: 31.3 GM/DL — LOW (ref 32–36)
MCV RBC AUTO: 83.5 FL — SIGNIFICANT CHANGE UP (ref 80–100)
MONOCYTES # BLD AUTO: 0.09 K/UL — SIGNIFICANT CHANGE UP (ref 0–0.9)
MONOCYTES NFR BLD AUTO: 3.6 % — SIGNIFICANT CHANGE UP (ref 2–14)
NEUTROPHILS # BLD AUTO: 2.2 K/UL — SIGNIFICANT CHANGE UP (ref 1.8–7.4)
NEUTROPHILS NFR BLD AUTO: 84.8 % — HIGH (ref 43–77)
PHOSPHATE SERPL-MCNC: 2.4 MG/DL — LOW (ref 2.5–4.5)
PLAT MORPH BLD: NORMAL — SIGNIFICANT CHANGE UP
PLATELET # BLD AUTO: 197 K/UL — SIGNIFICANT CHANGE UP (ref 150–400)
POIKILOCYTOSIS BLD QL AUTO: SLIGHT — SIGNIFICANT CHANGE UP
POTASSIUM SERPL-MCNC: 4.5 MMOL/L — SIGNIFICANT CHANGE UP (ref 3.5–5.3)
POTASSIUM SERPL-SCNC: 4.5 MMOL/L — SIGNIFICANT CHANGE UP (ref 3.5–5.3)
PROT SERPL-MCNC: 6.4 G/DL — SIGNIFICANT CHANGE UP (ref 6–8.3)
PROTHROM AB SERPL-ACNC: 12.9 SEC — SIGNIFICANT CHANGE UP (ref 10.6–13.6)
RBC # BLD: 2.72 M/UL — LOW (ref 4.2–5.8)
RBC # FLD: 15.4 % — HIGH (ref 10.3–14.5)
RBC BLD AUTO: ABNORMAL
RH IG SCN BLD-IMP: POSITIVE — SIGNIFICANT CHANGE UP
SARS-COV-2 RNA SPEC QL NAA+PROBE: DETECTED
SODIUM SERPL-SCNC: 132 MMOL/L — LOW (ref 135–145)
WBC # BLD: 2.59 K/UL — LOW (ref 3.8–10.5)
WBC # FLD AUTO: 2.59 K/UL — LOW (ref 3.8–10.5)

## 2021-12-30 PROCEDURE — 99231 SBSQ HOSP IP/OBS SF/LOW 25: CPT

## 2021-12-30 PROCEDURE — 88313 SPECIAL STAINS GROUP 2: CPT | Mod: 26

## 2021-12-30 PROCEDURE — 99232 SBSQ HOSP IP/OBS MODERATE 35: CPT | Mod: GC

## 2021-12-30 PROCEDURE — 99232 SBSQ HOSP IP/OBS MODERATE 35: CPT

## 2021-12-30 PROCEDURE — 99233 SBSQ HOSP IP/OBS HIGH 50: CPT | Mod: GC

## 2021-12-30 PROCEDURE — 88307 TISSUE EXAM BY PATHOLOGIST: CPT | Mod: 26

## 2021-12-30 RX ORDER — INSULIN LISPRO 100/ML
VIAL (ML) SUBCUTANEOUS
Refills: 0 | Status: DISCONTINUED | OUTPATIENT
Start: 2021-12-30 | End: 2021-12-30

## 2021-12-30 RX ORDER — INSULIN LISPRO 100/ML
VIAL (ML) SUBCUTANEOUS
Refills: 0 | Status: DISCONTINUED | OUTPATIENT
Start: 2021-12-30 | End: 2022-01-06

## 2021-12-30 RX ORDER — INSULIN LISPRO 100/ML
11 VIAL (ML) SUBCUTANEOUS
Refills: 0 | Status: DISCONTINUED | OUTPATIENT
Start: 2021-12-30 | End: 2021-12-31

## 2021-12-30 RX ORDER — INSULIN GLARGINE 100 [IU]/ML
18 INJECTION, SOLUTION SUBCUTANEOUS AT BEDTIME
Refills: 0 | Status: DISCONTINUED | OUTPATIENT
Start: 2021-12-30 | End: 2021-12-31

## 2021-12-30 RX ORDER — INSULIN LISPRO 100/ML
VIAL (ML) SUBCUTANEOUS AT BEDTIME
Refills: 0 | Status: DISCONTINUED | OUTPATIENT
Start: 2021-12-30 | End: 2021-12-30

## 2021-12-30 RX ORDER — INSULIN LISPRO 100/ML
19 VIAL (ML) SUBCUTANEOUS ONCE
Refills: 0 | Status: COMPLETED | OUTPATIENT
Start: 2021-12-30 | End: 2021-12-30

## 2021-12-30 RX ORDER — INSULIN LISPRO 100/ML
VIAL (ML) SUBCUTANEOUS EVERY 6 HOURS
Refills: 0 | Status: DISCONTINUED | OUTPATIENT
Start: 2021-12-30 | End: 2021-12-30

## 2021-12-30 RX ADMIN — INSULIN GLARGINE 18 UNIT(S): 100 INJECTION, SOLUTION SUBCUTANEOUS at 22:38

## 2021-12-30 RX ADMIN — Medication 19 UNIT(S): at 08:17

## 2021-12-30 RX ADMIN — Medication 500000 UNIT(S): at 05:14

## 2021-12-30 RX ADMIN — Medication 500000 UNIT(S): at 11:02

## 2021-12-30 RX ADMIN — Medication 6 MILLIGRAM(S): at 05:14

## 2021-12-30 RX ADMIN — Medication 500000 UNIT(S): at 00:26

## 2021-12-30 RX ADMIN — VALGANCICLOVIR 450 MILLIGRAM(S): 450 TABLET, FILM COATED ORAL at 11:02

## 2021-12-30 RX ADMIN — Medication 4: at 17:00

## 2021-12-30 RX ADMIN — ATORVASTATIN CALCIUM 40 MILLIGRAM(S): 80 TABLET, FILM COATED ORAL at 21:51

## 2021-12-30 RX ADMIN — EVEROLIMUS 4 MILLIGRAM(S): 10 TABLET ORAL at 05:26

## 2021-12-30 RX ADMIN — PANTOPRAZOLE SODIUM 40 MILLIGRAM(S): 20 TABLET, DELAYED RELEASE ORAL at 17:05

## 2021-12-30 RX ADMIN — EVEROLIMUS 4 MILLIGRAM(S): 10 TABLET ORAL at 17:04

## 2021-12-30 RX ADMIN — PANTOPRAZOLE SODIUM 40 MILLIGRAM(S): 20 TABLET, DELAYED RELEASE ORAL at 05:14

## 2021-12-30 RX ADMIN — Medication 500000 UNIT(S): at 17:04

## 2021-12-30 RX ADMIN — Medication 1 TABLET(S): at 11:03

## 2021-12-30 RX ADMIN — Medication 8: at 11:16

## 2021-12-30 RX ADMIN — HEPARIN SODIUM 5000 UNIT(S): 5000 INJECTION INTRAVENOUS; SUBCUTANEOUS at 05:15

## 2021-12-30 NOTE — PROGRESS NOTE ADULT - SUBJECTIVE AND OBJECTIVE BOX
Gastroenterology/Hepatology Progress Note      Interval Events:   - no acute events overnight  - this morning w/ worsening liver enzymes  - no abd pain    Allergies:  codeine (Anaphylaxis)      Hospital Medications:  atorvastatin 40 milliGRAM(s) Oral at bedtime  benzocaine 15 mG/menthol 3.6 mG (Sugar-Free) Lozenge 1 Lozenge Oral every 6 hours PRN  benzonatate 100 milliGRAM(s) Oral every 8 hours PRN  dexAMETHasone     Tablet 6 milliGRAM(s) Oral daily  dextrose 40% Gel 15 Gram(s) Oral once  dextrose 5%. 1000 milliLiter(s) IV Continuous <Continuous>  dextrose 5%. 1000 milliLiter(s) IV Continuous <Continuous>  dextrose 50% Injectable 25 Gram(s) IV Push once  dextrose 50% Injectable 12.5 Gram(s) IV Push once  dextrose 50% Injectable 25 Gram(s) IV Push once  everolimus (ZORTRESS) 4 milliGRAM(s) Oral two times a day  glucagon  Injectable 1 milliGRAM(s) IntraMuscular once  insulin glargine Injectable (LANTUS) 18 Unit(s) SubCutaneous at bedtime  insulin lispro (ADMELOG) corrective regimen sliding scale   SubCutaneous every 6 hours  insulin lispro Injectable (ADMELOG) 11 Unit(s) SubCutaneous three times a day before meals  nystatin    Suspension 886583 Unit(s) Oral four times a day  pantoprazole    Tablet 40 milliGRAM(s) Oral two times a day  trimethoprim   80 mG/sulfamethoxazole 400 mG 1 Tablet(s) Oral daily  valGANciclovir 450 milliGRAM(s) Oral daily        PHYSICAL EXAM:   Vital Signs:  Vital Signs Last 24 Hrs  T(C): 36.7 (30 Dec 2021 04:29), Max: 36.7 (30 Dec 2021 04:29)  T(F): 98.1 (30 Dec 2021 04:29), Max: 98.1 (30 Dec 2021 04:29)  HR: 73 (30 Dec 2021 04:29) (73 - 91)  BP: 169/79 (30 Dec 2021 04:29) (122/71 - 169/79)  BP(mean): --  RR: 18 (30 Dec 2021 04:29) (18 - 21)  SpO2: 94% (30 Dec 2021 09:11) (94% - 97%)  Daily     Daily     GENERAL:  No acute distress  HEENT:  NCAT, no scleral icterus  CHEST: no resp distress on RA  HEART:  RRR  ABDOMEN:  surgical site c/d/i, nontender, no r/g  EXTREMITIES:  No cyanosis, clubbing, or edema  SKIN:  No rash/erythema/ecchymoses/petechiae/wounds/abscess/warm/dry  NEURO:  Alert and oriented x 3, no asterixis, no tremor    LABS:                        7.1    2.59  )-----------( 197      ( 30 Dec 2021 07:23 )             22.7     Mean Cell Volume: 83.5 fl (12-30-21 @ 07:23)    12-30    132<L>  |  99  |  36<H>  ----------------------------<  296<H>  4.5   |  18<L>  |  1.84<H>    Ca    9.0      30 Dec 2021 07:31  Phos  2.4     12-30  Mg     2.2     12-30    TPro  6.4  /  Alb  3.0<L>  /  TBili  0.2  /  DBili  x   /  AST  129<H>  /  ALT  190<H>  /  AlkPhos  491<H>  12-30    LIVER FUNCTIONS - ( 30 Dec 2021 07:31 )  Alb: 3.0 g/dL / Pro: 6.4 g/dL / ALK PHOS: 491 U/L / ALT: 190 U/L / AST: 129 U/L / GGT: x           PT/INR - ( 30 Dec 2021 07:32 )   PT: 12.9 sec;   INR: 1.08 ratio         PTT - ( 30 Dec 2021 07:32 )  PTT:31.5 sec          Imaging:

## 2021-12-30 NOTE — PROGRESS NOTE ADULT - SUBJECTIVE AND OBJECTIVE BOX
SURGICAL SCHEDULING REQUEST    Dr: Oliver Ruiz MD     Patient Name: Fran Ellison    History #: [id number]    : 1967    Telephone:  [phone]    Procedure: hysteroscopy, D&C, Myosure, possible hysteroscopic myomectomy, under ultrasound guidance    Diagnosis: postmenopausal bleeding, submucosal fibroids    Location: [facility]  Type: URGENT  Special Instructions:    Pt recently had leg surgery. Ok to schedule 2 weeks or later after 2021, when leg boot will be taken off    Please remind pt to take preprocedural cytotec       HISTORY  65 y/o M with PMHx of decompensated JEAN cirrhosis c/b small esophageal varices (12/2019), portal hypertensive gastropathy, ascites, hx SBP, hx hepatic encephalopathy, GAVE syndrome s/p APC, hx duodenal ulcer (5/2019), HTN, HLD, DM, HFpEF (EF 70%), recent ESBL E coli prostate abscess (3/2020 s/p 4 weeks ertapenem), orthostatic hypotension ( on midodrine) who was initially admitted to medicine for hepatic encephalopathy. Hospital course c/b MISA, VRE UTI, acute blood loss anemia, received blood transfusion, EGD done 6/22 with Grade II esophageal varices, acute gastritis with hemorrhage and acute duodenitis with hemorrhage. Patient had worsening mental status, concerning decompensating liver cirrhosis, hepatic encephalopathy patient then transferred to SICU for further care.    24 HOUR EVENTS:  -Pt to go to OR thursday (7/2/20) morning for liver transplant  -H/H 6.8/19.4, plt 18 --> pt given 3u PRBC and 1 pack of platelets preoperatively  -Clarithromycin and Flagyl discontinued  -Left radial arterial line placed for OR  -Episode of hyperglycemia --> added low-dose ISS q6      SUBJECTIVE/ROS:  [ ] A ten-point review of systems was otherwise negative except as noted.  [ ] Due to altered mental status/intubation, subjective information were not able to be obtained from the patient. History was obtained, to the extent possible, from review of the chart and collateral sources of information.      NEURO  Exam: awake, alert, oriented  Meds: ondansetron Injectable 4 milliGRAM(s) IV Push every 8 hours  [x] Adequacy of sedation and pain control has been assessed and adjusted      RESPIRATORY  RR: 11 (07-02-20 @ 00:00) (10 - 25)  SpO2: 100% (07-02-20 @ 00:00) (95% - 100%)  Exam: unlabored, saturaign well on room air      CARDIOVASCULAR  HR: 72 (07-02-20 @ 00:00) (70 - 85)  BP: 105/51 (07-01-20 @ 19:00) (101/50 - 131/61)  BP(mean): 73 (07-01-20 @ 19:00) (72 - 101)  ABP: 118/40 (07-02-20 @ 00:00) (98/53 - 135/47)  ABP(mean): 65 (07-02-20 @ 00:00) (64 - 75)  Exam: regular rate and rhythm  Cardiac Rhythm: normal sinus  Perfusion     [x]Adequate   [ ]Inadequate  Mentation   [x]Normal       [ ]Reduced  Extremities  [x]Warm         [ ]Cool  Volume Status [ ]Hypervolemic [x]Euvolemic [ ]Hypovolemic  Meds: midodrine. 20 milliGRAM(s) Oral three times a day  tamsulosin 0.4 milliGRAM(s) Oral at bedtime      GI/NUTRITION  Exam: soft, nontender, nondistended  Diet: NPO after 10pm for procedure  Meds: lactulose Syrup 30 Gram(s) Oral every 6 hours  pantoprazole  Injectable 40 milliGRAM(s) IV Push every 12 hours      GENITOURINARY  I&O's Detail    06-30 @ 07:01  -  07-01 @ 07:00  --------------------------------------------------------  IN:    octreotide  Infusion: 230 mL    Oral Fluid: 200 mL    Packed Red Blood Cells: 300 mL    Solution: 300 mL  Total IN: 1030 mL    OUT:    Voided: 1625 mL  Total OUT: 1625 mL    Total NET: -595 mL      07-01 @ 07:01  -  07-02 @ 00:25  --------------------------------------------------------  IN:    octreotide  Infusion: 160 mL    Oral Fluid: 100 mL    Packed Red Blood Cells: 300 mL    Solution: 62.5 mL  Total IN: 622.5 mL    OUT:    Voided: 1300 mL  Total OUT: 1300 mL    Total NET: -677.5 mL      Weight (kg): 103 (07-01 @ 13:54)  07-01    133<L>  |  99  |  55<H>  ----------------------------<  105<H>  5.4<H>   |  23  |  1.33<H>    Ca    10.3      01 Jul 2020 05:21  Phos  2.9     07-01  Mg     2.0     07-01    TPro  4.7<L>  /  Alb  3.2<L>  /  TBili  15.9<H>  /  DBili  3.6<H>  /  AST  29  /  ALT  15  /  AlkPhos  83  07-01    [ ] Beasley catheter, indication: N/A  Meds: folic acid 1 milliGRAM(s) Oral daily      HEMATOLOGIC  Meds:   [] VTE Prophylaxis: ICDs                        6.8    12.15 )-----------( 18       ( 01 Jul 2020 18:37 )             19.4     PT/INR - ( 01 Jul 2020 01:19 )   PT: 32.6 sec;   INR: 2.99 ratio    PTT - ( 01 Jul 2020 01:19 )  PTT:52.8 sec  Transfusion     [ ] PRBC   [ ] Platelets   [ ] FFP   [ ] Cryoprecipitate      INFECTIOUS DISEASES  WBC Count: 12.15 K/uL (07-01 @ 18:37)  WBC Count: 15.32 K/uL (07-01 @ 01:19)  RECENT CULTURES:  Meds: DAPTOmycin IVPB 600 milliGRAM(s) IV Intermittent every 24 hours  epoetin barry-epbx (RETACRIT) Injectable 84309 Unit(s) SubCutaneous <User Schedule>  hepatitis B Vaccine - Adult (ENGERIX-B) 20 MICROGram(s) IntraMuscular once  meropenem  IVPB 500 milliGRAM(s) IV Intermittent once  nystatin    Suspension 462519 Unit(s) Oral four times a day  rifAXIMin 550 milliGRAM(s) Oral two times a day  trimethoprim  160 mG/sulfamethoxazole 800 mG 1 Tablet(s) Oral daily      ENDOCRINE  CAPILLARY BLOOD GLUCOSE  POCT Blood Glucose.: 292 mg/dL (01 Jul 2020 21:54)  POCT Blood Glucose.: 307 mg/dL (01 Jul 2020 21:29)  Meds: insulin lispro (HumaLOG) corrective regimen sliding scale   SubCutaneous three times a day before meals  octreotide  Infusion 50 MICROgram(s)/Hr IV Continuous <Continuous>        ACCESS DEVICES:  [x] Peripheral IV  [ ] Central Venous Line	[ ] R	[ ] L	[ ] IJ	[ ] Fem	[ ] SC	Placed:   [x] Arterial Line		[ ] R	[x] L	[ ] Fem	[x] Rad	[ ] Ax	Placed: 07/01/20  [ ] PICC:					[ ] Mediport  [ ] Urinary Catheter, Date Placed:   [x] Necessity of urinary, arterial, and venous catheters discussed    OTHER MEDICATIONS:  chlorhexidine 2% Cloths 1 Application(s) Topical <User Schedule>  nystatin Cream 1 Application(s) Topical two times a day      CODE STATUS: full code      IMAGING: HISTORY  63 y/o M with PMHx of decompensated JEAN cirrhosis c/b small esophageal varices (12/2019), portal hypertensive gastropathy, ascites, hx SBP, hx hepatic encephalopathy, GAVE syndrome s/p APC, hx duodenal ulcer (5/2019), HTN, HLD, DM, HFpEF (EF 70%), recent ESBL E coli prostate abscess (3/2020 s/p 4 weeks ertapenem), orthostatic hypotension ( on midodrine) who was initially admitted to medicine for hepatic encephalopathy. Hospital course c/b MISA, VRE UTI, acute blood loss anemia, received blood transfusion, EGD done 6/22 with Grade II esophageal varices, acute gastritis with hemorrhage and acute duodenitis with hemorrhage. Patient had worsening mental status, concerning decompensating liver cirrhosis, hepatic encephalopathy patient then transferred to SICU for further care.    24 HOUR EVENTS:  -Pt to go to OR thursday (7/2/20) morning for liver transplant  -H/H 6.8/19.4, plt 18 --> pt given 3u PRBC and 1 pack of platelets preoperatively  -Clarithromycin and Flagyl discontinued  -Left radial arterial line placed for OR  -Episode of hyperglycemia () --> added low-dose ISS q6      SUBJECTIVE/ROS:  [ ] A ten-point review of systems was otherwise negative except as noted.  [ ] Due to altered mental status/intubation, subjective information were not able to be obtained from the patient. History was obtained, to the extent possible, from review of the chart and collateral sources of information.      NEURO  Exam: awake, alert, oriented  Meds: ondansetron Injectable 4 milliGRAM(s) IV Push every 8 hours  [x] Adequacy of sedation and pain control has been assessed and adjusted      RESPIRATORY  RR: 11 (07-02-20 @ 00:00) (10 - 25)  SpO2: 100% (07-02-20 @ 00:00) (95% - 100%)  Exam: unlabored, saturaign well on room air      CARDIOVASCULAR  HR: 72 (07-02-20 @ 00:00) (70 - 85)  BP: 105/51 (07-01-20 @ 19:00) (101/50 - 131/61)  BP(mean): 73 (07-01-20 @ 19:00) (72 - 101)  ABP: 118/40 (07-02-20 @ 00:00) (98/53 - 135/47)  ABP(mean): 65 (07-02-20 @ 00:00) (64 - 75)  Exam: regular rate and rhythm  Cardiac Rhythm: normal sinus  Perfusion     [x]Adequate   [ ]Inadequate  Mentation   [x]Normal       [ ]Reduced  Extremities  [x]Warm         [ ]Cool  Volume Status [ ]Hypervolemic [x]Euvolemic [ ]Hypovolemic  Meds: midodrine. 20 milliGRAM(s) Oral three times a day  tamsulosin 0.4 milliGRAM(s) Oral at bedtime      GI/NUTRITION  Exam: soft, nontender, nondistended  Diet: NPO after 10pm for procedure  Meds: lactulose Syrup 30 Gram(s) Oral every 6 hours  pantoprazole  Injectable 40 milliGRAM(s) IV Push every 12 hours      GENITOURINARY  I&O's Detail    06-30 @ 07:01  -  07-01 @ 07:00  --------------------------------------------------------  IN:    octreotide  Infusion: 230 mL    Oral Fluid: 200 mL    Packed Red Blood Cells: 300 mL    Solution: 300 mL  Total IN: 1030 mL    OUT:    Voided: 1625 mL  Total OUT: 1625 mL    Total NET: -595 mL      07-01 @ 07:01  -  07-02 @ 00:25  --------------------------------------------------------  IN:    octreotide  Infusion: 160 mL    Oral Fluid: 100 mL    Packed Red Blood Cells: 300 mL    Solution: 62.5 mL  Total IN: 622.5 mL    OUT:    Voided: 1300 mL  Total OUT: 1300 mL    Total NET: -677.5 mL      Weight (kg): 103 (07-01 @ 13:54)  07-01    133<L>  |  99  |  55<H>  ----------------------------<  105<H>  5.4<H>   |  23  |  1.33<H>    Ca    10.3      01 Jul 2020 05:21  Phos  2.9     07-01  Mg     2.0     07-01    TPro  4.7<L>  /  Alb  3.2<L>  /  TBili  15.9<H>  /  DBili  3.6<H>  /  AST  29  /  ALT  15  /  AlkPhos  83  07-01    [ ] Beasley catheter, indication: N/A  Meds: folic acid 1 milliGRAM(s) Oral daily      HEMATOLOGIC  Meds:   [] VTE Prophylaxis: ICDs                        6.8    12.15 )-----------( 18       ( 01 Jul 2020 18:37 )             19.4     PT/INR - ( 01 Jul 2020 01:19 )   PT: 32.6 sec;   INR: 2.99 ratio    PTT - ( 01 Jul 2020 01:19 )  PTT:52.8 sec  Transfusion     [ ] PRBC   [ ] Platelets   [ ] FFP   [ ] Cryoprecipitate      INFECTIOUS DISEASES  WBC Count: 12.15 K/uL (07-01 @ 18:37)  WBC Count: 15.32 K/uL (07-01 @ 01:19)  RECENT CULTURES:  Meds: DAPTOmycin IVPB 600 milliGRAM(s) IV Intermittent every 24 hours  epoetin barry-epbx (RETACRIT) Injectable 17162 Unit(s) SubCutaneous <User Schedule>  hepatitis B Vaccine - Adult (ENGERIX-B) 20 MICROGram(s) IntraMuscular once  meropenem  IVPB 500 milliGRAM(s) IV Intermittent once  nystatin    Suspension [id number] Unit(s) Oral four times a day  rifAXIMin 550 milliGRAM(s) Oral two times a day  trimethoprim  160 mG/sulfamethoxazole 800 mG 1 Tablet(s) Oral daily      ENDOCRINE  CAPILLARY BLOOD GLUCOSE  POCT Blood Glucose.: 292 mg/dL (01 Jul 2020 21:54)  POCT Blood Glucose.: 307 mg/dL (01 Jul 2020 21:29)  Meds: insulin lispro (HumaLOG) corrective regimen sliding scale   SubCutaneous three times a day before meals  octreotide  Infusion 50 MICROgram(s)/Hr IV Continuous <Continuous>        ACCESS DEVICES:  [x] Peripheral IV  [ ] Central Venous Line	[ ] R	[ ] L	[ ] IJ	[ ] Fem	[ ] SC	Placed:   [x] Arterial Line		[ ] R	[x] L	[ ] Fem	[x] Rad	[ ] Ax	Placed: 07/01/20  [ ] PICC:					[ ] Mediport  [ ] Urinary Catheter, Date Placed:   [x] Necessity of urinary, arterial, and venous catheters discussed    OTHER MEDICATIONS:  chlorhexidine 2% Cloths 1 Application(s) Topical <User Schedule>  nystatin Cream 1 Application(s) Topical two times a day      CODE STATUS: full code      IMAGING:

## 2021-12-30 NOTE — PROGRESS NOTE ADULT - SUBJECTIVE AND OBJECTIVE BOX
PROGRESS NOTE:   Authored by Johnathan Ordonez MD  Pager: [facility] [phone]; [phone]    Patient is a 66y old  Male who presents with a chief complaint of Fall (29 Dec 2021 16:22)      SUBJECTIVE / OVERNIGHT EVENTS:  No acute events overnight.     ADDITIONAL REVIEW OF SYSTEMS:    MEDICATIONS  (STANDING):  atorvastatin 40 milliGRAM(s) Oral at bedtime  dexAMETHasone     Tablet 6 milliGRAM(s) Oral daily  dextrose 40% Gel 15 Gram(s) Oral once  dextrose 5%. 1000 milliLiter(s) (50 mL/Hr) IV Continuous <Continuous>  dextrose 5%. 1000 milliLiter(s) (100 mL/Hr) IV Continuous <Continuous>  dextrose 50% Injectable 12.5 Gram(s) IV Push once  dextrose 50% Injectable 25 Gram(s) IV Push once  dextrose 50% Injectable 25 Gram(s) IV Push once  everolimus (ZORTRESS) 4 milliGRAM(s) Oral two times a day  glucagon  Injectable 1 milliGRAM(s) IntraMuscular once  heparin   Injectable 5000 Unit(s) SubCutaneous every 8 hours  insulin glargine Injectable (LANTUS) 18 Unit(s) SubCutaneous at bedtime  insulin lispro (ADMELOG) corrective regimen sliding scale   SubCutaneous three times a day before meals  insulin lispro (ADMELOG) corrective regimen sliding scale   SubCutaneous at bedtime  insulin lispro Injectable (ADMELOG) 11 Unit(s) SubCutaneous three times a day before meals  insulin lispro Injectable (ADMELOG). 19 Unit(s) SubCutaneous once  nystatin    Suspension 604779 Unit(s) Oral four times a day  pantoprazole    Tablet 40 milliGRAM(s) Oral two times a day  trimethoprim   80 mG/sulfamethoxazole 400 mG 1 Tablet(s) Oral daily  valGANciclovir 450 milliGRAM(s) Oral daily    MEDICATIONS  (PRN):  benzocaine 15 mG/menthol 3.6 mG (Sugar-Free) Lozenge 1 Lozenge Oral every 6 hours PRN Sore Throat  benzonatate 100 milliGRAM(s) Oral every 8 hours PRN Cough      CAPILLARY BLOOD GLUCOSE      POCT Blood Glucose.: 332 mg/dL (30 Dec 2021 07:24)  POCT Blood Glucose.: 392 mg/dL (29 Dec 2021 21:07)  POCT Blood Glucose.: 399 mg/dL (29 Dec 2021 21:04)  POCT Blood Glucose.: 385 mg/dL (29 Dec 2021 15:45)  POCT Blood Glucose.: 326 mg/dL (29 Dec 2021 11:45)  POCT Blood Glucose.: 216 mg/dL (29 Dec 2021 08:11)    I&O's Summary    29 Dec 2021 07:01  -  30 Dec 2021 07:00  --------------------------------------------------------  IN: 360 mL / OUT: 1100 mL / NET: -740 mL    30 Dec 2021 07:01  -  30 Dec 2021 08:03  --------------------------------------------------------  IN: 0 mL / OUT: 950 mL / NET: -950 mL        PHYSICAL EXAM:  Vital Signs Last 24 Hrs  T(C): 36.7 (30 Dec 2021 04:29), Max: 36.7 (30 Dec 2021 04:29)  T(F): 98.1 (30 Dec 2021 04:29), Max: 98.1 (30 Dec 2021 04:29)  HR: 73 (30 Dec 2021 04:29) (73 - 91)  BP: 169/79 (30 Dec 2021 04:29) (122/71 - 169/79)  BP(mean): --  RR: 18 (30 Dec 2021 04:29) (18 - 21)  SpO2: 94% (30 Dec 2021 04:29) (94% - 97%)    CONSTITUTIONAL: NAD, well-developed  HEENT: normal conjunctiva, PEERLA  RESPIRATORY: b/l decreased lung sounds  CARDIOVASCULAR: Regular rate and rhythm, normal S1 and S2, no murmur/rub/gallop;   ABDOMEN: No tenderness to palpation at all four quadrants, +BS  MUSCULOSKELETAL no clubbing or cyanosis of digits; no joint swelling or tenderness to palpation  EXTREMITIES No lower extremity edema; Peripheral pulses are 2+ bilaterally  SKIN: well-perfused, no dry skin, pt subjectively complained about tingling/numbness    LABS:                        7.1    2.59  )-----------( 197      ( 30 Dec 2021 07:23 )             22.7     12-29    136  |  104  |  33<H>  ----------------------------<  188<H>  4.3   |  19<L>  |  1.88<H>    Ca    9.1      29 Dec 2021 07:42    TPro  6.4  /  Alb  2.9<L>  /  TBili  0.3  /  DBili  0.1  /  AST  116<H>  /  ALT  142<H>  /  AlkPhos  444<H>  12-29    PT/INR - ( 29 Dec 2021 06:37 )   PT: 14.1 sec;   INR: 1.18 ratio                     RADIOLOGY & ADDITIONAL TESTS:  Results Reviewed:   Imaging Personally Reviewed:  Electrocardiogram Personally Reviewed:    COORDINATION OF CARE:  Care Discussed with Consultants/Other Providers [Y/N]:  Prior or Outpatient Records Reviewed [Y/N]:

## 2021-12-30 NOTE — PROCEDURE NOTE - SEDATION
Problem: PAIN - ADULT  Goal: Verbalizes/displays adequate comfort level or baseline comfort level  Description  Interventions:  - Encourage patient to monitor pain and request assistance  - Assess pain using appropriate pain scale  - Administer analgesics based on type and severity of pain and evaluate response  - Implement non-pharmacological measures as appropriate and evaluate response  - Consider cultural and social influences on pain and pain management  - Notify physician/advanced practitioner if interventions unsuccessful or patient reports new pain  Outcome: Progressing     Problem: INFECTION - ADULT  Goal: Absence or prevention of progression during hospitalization  Description  INTERVENTIONS:  - Assess and monitor for signs and symptoms of infection  - Monitor lab/diagnostic results  - Monitor all insertion sites, i e  indwelling lines, tubes, and drains  - Monitor endotracheal if appropriate and nasal secretions for changes in amount and color  - [place] appropriate cooling/warming therapies per order  - Administer medications as ordered  - Instruct and encourage patient and family to use good hand hygiene technique  - Identify and instruct in appropriate isolation precautions for identified infection/condition  Outcome: Progressing     Problem: SAFETY ADULT  Goal: Patient will remain free of falls  Description  INTERVENTIONS:  - Assess patient frequently for physical needs  -  Identify cognitive and physical deficits and behaviors that affect risk of falls    -  [place] fall precautions as indicated by assessment   - Educate patient/family on patient safety including physical limitations  - Instruct patient to call for assistance with activity based on assessment  - Modify environment to reduce risk of injury  - Consider OT/PT consult to assist with strengthening/mobility  Outcome: Progressing  Goal: Maintain or return to baseline ADL function  Description  INTERVENTIONS:  -  Assess patient's ability to carry out ADLs; assess patient's baseline for ADL function and identify physical deficits which impact ability to perform ADLs (bathing, care of mouth/teeth, toileting, grooming, dressing, etc )  - Assess/evaluate cause of self-care deficits   - Assess range of motion  - Assess patient's mobility; develop plan if impaired  - Assess patient's need for assistive devices and provide as appropriate  - Encourage maximum independence but intervene and supervise when necessary  - Involve family in performance of ADLs  - Assess for home care needs following discharge   - Consider OT consult to assist with ADL evaluation and planning for discharge  - Provide patient education as appropriate  Outcome: Progressing  Goal: Maintain or return mobility status to optimal level  Description  INTERVENTIONS:  - Assess patient's baseline mobility status (ambulation, transfers, stairs, etc )    - Identify cognitive and physical deficits and behaviors that affect mobility  - Identify mobility aids required to assist with transfers and/or ambulation (gait belt, sit-to-stand, lift, walker, cane, etc )  - [place] fall precautions as indicated by assessment  - Record patient progress and toleration of activity level on Mobility SBAR; progress patient to next Phase/Stage  - Instruct patient to call for assistance with activity based on assessment  - Consider rehabilitation consult to assist with strengthening/weightbearing, etc   Outcome: Progressing     Problem: DISCHARGE PLANNING  Goal: Discharge to home or other facility with appropriate resources  Description  INTERVENTIONS:  - Identify barriers to discharge w/patient and caregiver  - Arrange for needed discharge resources and transportation as appropriate  - Identify discharge learning needs (meds, wound care, etc )  - Arrange for interpretive services to assist at discharge as needed  - Refer to Case Management Department for coordinating discharge planning if the patient needs post-hospital services based on physician/advanced practitioner order or complex needs related to functional status, cognitive ability, or social support system  Outcome: Progressing     Problem: Knowledge Deficit  Goal: Patient/family/caregiver demonstrates understanding of disease process, treatment plan, medications, and discharge instructions  Description  Complete learning assessment and assess knowledge base    Interventions:  - Provide teaching at level of understanding  - Provide teaching via preferred learning methods  Outcome: Progressing     Problem: CARDIOVASCULAR - ADULT  Goal: Maintains optimal cardiac output and hemodynamic stability  Description  INTERVENTIONS:  - Monitor I/O, vital signs and rhythm  - Monitor for S/S and trends of decreased cardiac output  - Administer and titrate ordered vasoactive medications to optimize hemodynamic stability  - Assess quality of pulses, skin color and temperature  - Assess for signs of decreased coronary artery perfusion  - Instruct patient to report change in severity of symptoms  Outcome: Progressing  Goal: Absence of cardiac dysrhythmias or at baseline rhythm  Description  INTERVENTIONS:  - Continuous cardiac monitoring, vital signs, obtain 12 lead EKG if ordered  - Administer antiarrhythmic and heart rate control medications as ordered  - Monitor electrolytes and administer replacement therapy as ordered  Outcome: Progressing     Problem: RESPIRATORY - ADULT  Goal: Achieves optimal ventilation and oxygenation  Description  INTERVENTIONS:  - Assess for changes in respiratory status  - Assess for changes in mentation and behavior  - Position to facilitate oxygenation and minimize respiratory effort  - Oxygen administered by appropriate delivery if ordered  - Initiate smoking cessation education as indicated  - Encourage broncho-pulmonary hygiene including cough, deep breathe, Incentive Spirometry  - Assess the need for suctioning and aspirate as needed  - Assess and instruct to report SOB or any respiratory difficulty  - Respiratory Therapy support as indicated  Outcome: Progressing     Problem: Potential for Falls  Goal: Patient will remain free of falls  Description  INTERVENTIONS:  - Assess patient frequently for physical needs  -  Identify cognitive and physical deficits and behaviors that affect risk of falls    -  [place] fall precautions as indicated by assessment   - Educate patient/family on patient safety including physical limitations  - Instruct patient to call for assistance with activity based on assessment  - Modify environment to reduce risk of injury  - Consider OT/PT consult to assist with strengthening/mobility  Outcome: Progressing Provided by Anesthesia Department

## 2021-12-30 NOTE — PROGRESS NOTE ADULT - ASSESSMENT
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 (course c/b VRE bacteremia, CNI toxicity to both tacro and cyclosporine; switched to everolimus 7/27/20), HLD, obesity, HFpEF presenting w/ fall    # elevated liver enzymes - newly elevated AST/ALT 12/28/21 and rising, unclear etiology, no abd pain, nl bili, DILI (2/2 remdesivir) possible, rejection (while holding MMF) possible, COVID induced ischemic injury possible (though lower levels than expected), will plan for liver biopsy to guide further management  # fall - likely 2/2 pulmonary symptoms; trop negative, TTE (poor study) negative, no events on tele, no EEG, no brain MRI, plan for outpt follow up  # pulmonary symptoms - patient w/ 1+ month of upper respiratory symptoms, of cough. Presented to ED 11/19/21 with negative workup; this current presentation now found to be COVID positive w/ CT chest showing patchy groundglass opacities. DDx for symptoms include COVID (though had symptoms of cough 1 month ago) vs everolimus pneumonitis. 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 Patient was seen by pulmonary, unable to differentiate between everolimus pneumonitis and COVID. Case was reviewed with radiology - believes that CT findings more consistent with COVID (patchy ground glass infiltrates) and not everolimus pneumonitis (no fibrosis). Would recommend treating for COVID (remdesivir) and continuing current immunosuppression with plan to re-image in a short interval. If patient's respiratory symptoms worsen, will readdress role of holding everolimus and possibly higher doses of steroids for pneumonitis.     # DM - poorly controlled DM, A1c 10.1    # anemia - patient w/ acute on chronic anemia, had dark stools, though unclear if melena (green around edges) and on iron; will continue w/ supportive management, iron supplementation, transfusions prn, no plans for endoscopic evaluation at this time    Recommendations:  - consult IR for liver biopsy  - f/u everolimus level  - c/w everolimus 4mg q12h  - hold cellcept   - c/w dexamethasone 6 PO qd  - check CMV PCR  - pending liver biopsy results will guide steroid dosing and restarting cellcept   - c/w bactrim and valcyte   - s/p remdesivir   - pantropazole 40mg PO BID  - no plans for endoscopic evaluation   - glycemic control (may benefit from endocrine consult; patient does not currently follow w/ endo)  - trend CMP, INR  - rest of care per primary team    Hepatology will continue to follow.     Charles Salgado, PGY5  Gastroenterology/Hepatology Fellow  Available on Microsoft Teams  [phone] ([employer] Short Range Pager)  [phone] (Long Range Pager)    After 5pm, please contact the on-call GI fellow. [phone]

## 2021-12-30 NOTE — PROGRESS NOTE ADULT - ASSESSMENT
66 year old male PMH DDLT (07/2020, c/b post-op VRE bacteremia), prior ESBL E. coli prostate abscess, R heel OM (12/2020), C. 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    Found to be COVID19 Positive   U/A (12/20) 261 WBC   CT Chest (12/19) Patchy bilateral nodular and ground glass airspace opacities and Right apical irregular nodular opacity    Patient with persistent sore throat and cough which is largely unchanged since 11/2021.   Of note, patient's son tested positive for COVID19 but around time of last ED visit in mid-November 2021  Patient's COVID19 testing (11/29) and RVP (11/19) was Negative    Even if previous symptoms in 11/2021 were from COVID19 he may now have been exposed to Omicron   He has not mounted a detectable antibody response (as of 11/29/21 spike antibody)  He also now has high grade fevers  s/p Regen-CoV 12/20  s/p 5 days of Remdesivir    #COVID19  s/p Regen-CoV 12/20  s/p 5 days of Remdesivir  --Maintain COVID19 Isolation Precautions (21 days from positive swab, in light of immunocompromised status)  --Recommend Ferritin, CRP, D-Dimer every 48-72H  --Continue Dexamethasone 6 mg IV Q24H x 10 days    #Transaminitis  Likely COVID19 induced  --Continue to trend transaminases  --Planned for Liver transplant today    #Fever, Abnormal Urinalysis (but UCx Negative)  Fever likely COVID19 induced (GGO opacities in lungs which may be consistent)  MRSA/MSSA Nasal PCR Positive for MSSA  However, also with new nodular infiltrate on CT Chest in RUL  --s/p 5 days of Ceftriaxone    #Abnormal CT Chest (GGO and Irregular Nodular RUL Infiltrate)  Reviewed imaging with radiology today (12/21)  Unclear if GGO changes are secondary to COVID19 of Everolimus toxicity  Irregular Nodular RUL Infiltrate may be atypical manifestation of COVID19 but will obtain workup as below  Serum Cryptococcal Ag Negative  Fungitell <31  Aspergillus galactomannan negative  --Repeat imaging in ~1 month    I will be away over this upcoming weekend. Please contact the Infectious Diseases Office with any further questions or concerns.     Eusebio Pérez M.D.  [facility] Division of Infectious Disease  8AM-5PM: Pager Number [phone]  After Hours (or if no response): Please contact the Infectious Diseases Office at [phone]

## 2021-12-30 NOTE — PROVIDER CONTACT NOTE (OTHER) - SITUATION
Patient diagnosed with Covid >10 days ago. Asymptomatic. No further isolation required.
Immunocompromised patient who is asymptomatic who continues to test negative on Covid swab. Patient's CT value was 29.4.
Patient refusing CPAP at bedtime
Orthostatic BP while working with PT  Patient was asymptomatic
Pt has not had a everolimus lab drawn.
Patient not tolerating CPAP at night
Pt does not have everolimus lab results. Unsure whether to administer morning dose of everolimus (4mg)
pt noncompliant with night CPAP

## 2021-12-30 NOTE — PROGRESS NOTE ADULT - PROBLEM SELECTOR PLAN 2
Cough and sore throat for the past few days. RVP positive for covid at this visit  - Currently in PICU (Covid unit)  - satting well on room air. Desats at night 2/2 SHENG. Refused cpap  - cough drops PRN  - s/p remdesivir (12/20 - 12/25 )  - s/p monoclonal antibody infusion (12/20)  - CTM  - c/w dexamethasone 10d (12/29-  )  - Pt current having increasing O2 requirement, will check O2 requirement with walking

## 2021-12-30 NOTE — PRE-OP CHECKLIST - ALLERGY BAND ON
Usage Warning: This plan is only intended for use with surgical procedures which generate a 90 day global period.  These procedures are listed below.  Use of this plan for other procedures (Mohs surgery or excision with a different repair than listed below for example) is inappropriate and will increase your risk of failing an audit. done Detail Level: Simple Major Surgery (90 Day Global Surgeries Only) To Be Peformed Today Or Tomorrow: Excision with Adjacent Tissue Transfer Repair Date Of Surgery: today Reason For Emergent Surgery: Pertinent risks, benefits, and alternative treatments, as well as potential consequences of no repair, were explained. The patient was encouraged to ask questions and all were answered. All closure options were carefully considered. In order to restore function, maximize cosmesis, and expedite healing, a flap/graft was felt to be the best option to close this particular surgical wound. As this is a major surgical procedure (90 day global period) the decision to perform the procedure required, additional time was spent with the patient discussing the procedure.

## 2021-12-30 NOTE — PROVIDER CONTACT NOTE (OTHER) - DATE AND TIME:
30-Dec-2021 13:40
26-Dec-2021 00:00
29-Dec-2021 11:28
21-Dec-2021 01:55
24-Dec-2021 00:00
28-Dec-2021 01:35
30-Dec-2021 13:00
27-Dec-2021 04:24

## 2021-12-30 NOTE — PROVIDER CONTACT NOTE (OTHER) - BACKGROUND
Admitted with fall s/p COVID
Patient admitted for syncope and collapse
Pt admitted for syncope and collapse. COVID positive. Pt on CPAP for sleep apnea.
Dx: Syncope & Collapse. COVID+, s/p liver tx 2020.
Dx: Syncope and Collapse
Given elevated value, patient does not seem to be in infectious process of disease. Patient will remain off isolation.
Patient admitted s/p syncope and collapse

## 2021-12-30 NOTE — PROGRESS NOTE ADULT - SUBJECTIVE AND OBJECTIVE BOX
Follow Up:      Interval History:    REVIEW OF SYSTEMS  [  ] ROS unobtainable because:    [  ] All other systems negative except as noted below    Constitutional:  [ ] fever [ ] chills  [ ] weight loss  [ ] weakness  Skin:  [ ] rash [ ] phlebitis	  Eyes: [ ] icterus [ ] pain  [ ] discharge	  ENMT: [ ] sore throat  [ ] thrush [ ] ulcers [ ] exudates  Respiratory: [ ] dyspnea [ ] hemoptysis [ ] cough [ ] sputum	  Cardiovascular:  [ ] chest pain [ ] palpitations [ ] edema	  Gastrointestinal:  [ ] nausea [ ] vomiting [ ] diarrhea [ ] constipation [ ] pain	  Genitourinary:  [ ] dysuria [ ] frequency [ ] hematuria [ ] discharge [ ] flank pain  [ ] incontinence  Musculoskeletal:  [ ] myalgias [ ] arthralgias [ ] arthritis  [ ] back pain  Neurological:  [ ] headache [ ] seizures  [ ] confusion/altered mental status    Allergies  codeine (Anaphylaxis)        ANTIMICROBIALS:  nystatin    Suspension 541207 four times a day  trimethoprim   80 mG/sulfamethoxazole 400 mG 1 daily  valGANciclovir 450 daily      OTHER MEDS:  MEDICATIONS  (STANDING):  atorvastatin 40 at bedtime  benzonatate 100 every 8 hours PRN  dexAMETHasone     Tablet 6 daily  dextrose 40% Gel 15 once  dextrose 50% Injectable 25 once  dextrose 50% Injectable 12.5 once  dextrose 50% Injectable 25 once  everolimus (ZORTRESS) 4 two times a day  glucagon  Injectable 1 once  insulin glargine Injectable (LANTUS) 18 at bedtime  insulin lispro (ADMELOG) corrective regimen sliding scale  every 6 hours  insulin lispro Injectable (ADMELOG) 11 three times a day before meals  pantoprazole    Tablet 40 two times a day      Vital Signs Last 24 Hrs  T(C): 36.3 (30 Dec 2021 18:20), Max: 37.2 (30 Dec 2021 14:45)  T(F): 97.3 (30 Dec 2021 18:20), Max: 98.9 (30 Dec 2021 14:45)  HR: 71 (30 Dec 2021 19:50) (65 - 91)  BP: 158/75 (30 Dec 2021 19:50) (137/69 - 169/79)  BP(mean): 101 (30 Dec 2021 18:35) (82 - 101)  RR: 16 (30 Dec 2021 19:50) (14 - 20)  SpO2: 98% (30 Dec 2021 19:50) (93% - 98%)    PHYSICAL EXAMINATION:  General: Alert and Awake, NAD  HEENT: PERRL, EOMI  Neck: Supple  Cardiac: RRR, No M/R/G  Resp: CTAB, No Wh/Rh/Ra  Abdomen: NBS, NT/ND, No HSM, No rigidity or guarding  MSK: No LE edema. No Calf tenderness  : No bob  Skin: No rashes or lesions. Skin is warm and dry to the touch.   Neuro: Alert and Awake. CN 2-12 Grossly intact. Moves all four extremities spontaneously.  Psych: Calm, Pleasant, Cooperative                          7.1    2.59  )-----------( 197      ( 30 Dec 2021 07:23 )             22.7       12-30    132<L>  |  99  |  36<H>  ----------------------------<  296<H>  4.5   |  18<L>  |  1.84<H>    Ca    9.0      30 Dec 2021 07:31  Phos  2.4     12-30  Mg     2.2     12-30    TPro  6.4  /  Alb  3.0<L>  /  TBili  0.2  /  DBili  x   /  AST  129<H>  /  ALT  190<H>  /  AlkPhos  491<H>  12-30          MICROBIOLOGY:  v  Clean Catch Clean Catch (Midstream)  12-20-21   <10,000 CFU/mL Normal Urogenital Shantel  --  --      .Blood Blood-Peripheral  12-20-21   No Growth Final  --  --      .Blood Blood-Peripheral  12-20-21   No Growth Final  --  --                RADIOLOGY:    <The imaging below has been reviewed and visualized by me independently. Findings as detailed in report below> Follow Up:  COVID19    Interval History: seen off of supplemental O2 today. planned for liver biopsy today.    REVIEW OF SYSTEMS  [  ] ROS unobtainable because:    [ x ] All other systems negative except as noted below    Constitutional:  [ ] fever [ ] chills  [ ] weight loss  [ ] weakness  Skin:  [ ] rash [ ] phlebitis	  Eyes: [ ] icterus [ ] pain  [ ] discharge	  ENMT: [ ] sore throat  [ ] thrush [ ] ulcers [ ] exudates  Respiratory: [ x] dyspnea [ ] hemoptysis [ x] cough [ ] sputum	  Cardiovascular:  [ ] chest pain [ ] palpitations [ ] edema	  Gastrointestinal:  [ ] nausea [ ] vomiting [ ] diarrhea [ ] constipation [ ] pain	  Genitourinary:  [ ] dysuria [ ] frequency [ ] hematuria [ ] discharge [ ] flank pain  [ ] incontinence  Musculoskeletal:  [ ] myalgias [ ] arthralgias [ ] arthritis  [ ] back pain  Neurological:  [ ] headache [ ] seizures  [ ] confusion/altered mental status    Allergies  codeine (Anaphylaxis)        ANTIMICROBIALS:  nystatin    Suspension 869726 four times a day  trimethoprim   80 mG/sulfamethoxazole 400 mG 1 daily  valGANciclovir 450 daily      OTHER MEDS:  MEDICATIONS  (STANDING):  atorvastatin 40 at bedtime  benzonatate 100 every 8 hours PRN  dexAMETHasone     Tablet 6 daily  dextrose 40% Gel 15 once  dextrose 50% Injectable 25 once  dextrose 50% Injectable 12.5 once  dextrose 50% Injectable 25 once  everolimus (ZORTRESS) 4 two times a day  glucagon  Injectable 1 once  insulin glargine Injectable (LANTUS) 18 at bedtime  insulin lispro (ADMELOG) corrective regimen sliding scale  every 6 hours  insulin lispro Injectable (ADMELOG) 11 three times a day before meals  pantoprazole    Tablet 40 two times a day      Vital Signs Last 24 Hrs  T(C): 36.3 (30 Dec 2021 18:20), Max: 37.2 (30 Dec 2021 14:45)  T(F): 97.3 (30 Dec 2021 18:20), Max: 98.9 (30 Dec 2021 14:45)  HR: 71 (30 Dec 2021 19:50) (65 - 91)  BP: 158/75 (30 Dec 2021 19:50) (137/69 - 169/79)  BP(mean): 101 (30 Dec 2021 18:35) (82 - 101)  RR: 16 (30 Dec 2021 19:50) (14 - 20)  SpO2: 98% (30 Dec 2021 19:50) (93% - 98%)    PHYSICAL EXAMINATION:    General: Alert and Awake, NAD  Cardiac: RRR, No M/R/G  Resp: CTAB, No Wh/Rh/Ra  Abdomen: NBS, NT/ND, No HSM, No rigidity or guarding  MSK: No LE edema. No Calf tenderness  : No bob  Skin: No rashes or lesions. Skin is warm and dry to the touch.   Neuro: Alert and Awake. CN 2-12 Grossly intact. Moves all four extremities spontaneously.  Psych: Calm, Pleasant, Cooperative                          7.1    2.59  )-----------( 197      ( 30 Dec 2021 07:23 )             22.7       12-30    132<L>  |  99  |  36<H>  ----------------------------<  296<H>  4.5   |  18<L>  |  1.84<H>    Ca    9.0      30 Dec 2021 07:31  Phos  2.4     12-30  Mg     2.2     12-30    TPro  6.4  /  Alb  3.0<L>  /  TBili  0.2  /  DBili  x   /  AST  129<H>  /  ALT  190<H>  /  AlkPhos  491<H>  12-30          MICROBIOLOGY:  v  Clean Catch Clean Catch (Midstream)  12-20-21   <10,000 CFU/mL Normal Urogenital Shantel  --  --      .Blood Blood-Peripheral  12-20-21   No Growth Final  --  --      .Blood Blood-Peripheral  12-20-21   No Growth Final  --  --    RADIOLOGY:    <The imaging below has been reviewed and visualized by me independently. Findings as detailed in report below>    < from: US Trans Liver W/ Doppler (12.29.21 @ 15:21) >    IMPRESSION:  Patent portal veins with normal directionality of flow.  Patent hepatic veins and hepatic artery.  Coarsened hepatic echotexture.    < end of copied text >

## 2021-12-30 NOTE — CONSULT NOTE ADULT - SUBJECTIVE AND OBJECTIVE BOX
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    -case reviewed and approved for today  -place IR procedure order under Dr. Adrian  -d/w primary team  -hold ac (patient received heparin sq @ 5am-- will plan to do this evening)  -remain NPO    please contact IR with questions or concern.s

## 2021-12-30 NOTE — PROGRESS NOTE ADULT - PROBLEM SELECTOR PLAN 4
Health Maintenance Due   Topic Date Due   • Influenza Vaccine (1) 09/01/2020       Patient is due for the topics as listed above and wishes to proceed with them. Orders placed for Immunization(s) Influenza.         Urinalysis consistent with UTI.  - Urine culture <10,000 normal urogenital ninfa

## 2021-12-30 NOTE — PROGRESS NOTE ADULT - PROBLEM SELECTOR PLAN 6
Mildly elevated admission AST/ALT at 45/66 respectively. Recent ultrasound showed good graft flow.   - Transplant hepatology recs appreciated  - RUQ US showed coarsened hepatic echotexture   - Will cont to monitor LFT's

## 2021-12-30 NOTE — PROGRESS NOTE ADULT - ATTENDING COMMENTS
Patient feeling well and wants to go home. Transplant Liver US seems unremarkable to me. Will monitor LFTs and discuss with hepatology. Continue with Everolimus for now (levels in lab) and Dexamethasone in lieu of Prednisone. He is receiving 10 days of dexamethasone per Transplant ID for sequelae of COVID (GGO in lungs). Currently intermittently requires oxygen for respiratory failure, will wean and ambulate patient. Will have PT come and re-evaluate patient for disposition. Increase basal-bolus by 20 to 25% for steroid-induced hyperglycemia. Begin discharge planning (assuming cleared by hepatology and at least stable respiratory failure).

## 2021-12-30 NOTE — PROVIDER CONTACT NOTE (OTHER) - REASON
Orthostatic BP
Discontinuation of Isolation for COVID patient
Patient's Cycle Threshold value is 29.4
Pt has not had a everolimus lab drawn.
Patient not tolerating CPAP at night
Pt does not have everolimus lab results. Unsure whether to administer morning dose of everolimus (4mg)
Pt noncompliant with night CPAP
Patient refusing CPAP at bedtime

## 2021-12-30 NOTE — PROGRESS NOTE ADULT - ATTENDING COMMENTS
65M, HTN, DM2, obesity, SHENG on noct. 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 after fall in the bathroom after he got up, felt dizzy and weak, likey due to  - COVID-19. No signif. cough here, mostly not wearing NC, good saturation; small pulm. infiltrates in lower and upper lung fields, thought more likely    COVID than everolimus pneumonitis, had cough for a few weeks, but several negative PCRs until the one on admission, mild hypoxia sO2 95% on O2 2L/min during the day and CPAP at night for SHENG  - CKD, creat ~1.8 stable  - immunosuppression with everolimus 4 mg bid, off MMF due to infection, on prednisone 5 mg/d. On valcyte prophylaxis.  - slowly increasing AST/ALT/ALP, on dexamethasone 12/28- for COVID - which may have prevented further rise if he has acute rejection. No abdominal pain or tenderness. Last US 12/15: coarsened hepatic echotexture, no focal lesion. CCY. Extrahepatic bile ducts not visualized. DD: COVID-related liver injury, DILI due to remdesivir or atorvastatin, rejection.     -- liver biopsy today, keep NPO, hold heparin  -- continue current immunosuppression and prophylaxis with valgancyclovir, bactrim and nystatin  -- please order a repeat everolimus trough level  -- daily CMP

## 2021-12-30 NOTE — PROGRESS NOTE ADULT - PROBLEM SELECTOR PLAN 11
Home insulin regimen 12 lantus and 10 humalog premeal. A1c 10.1.  -now on lantus 4u and premeal 2u TID, caution in s/o hypoglycemia  - Mod SSI  - CTM

## 2021-12-30 NOTE — CHART NOTE - NSCHARTNOTEFT_GEN_A_CORE
Spoke with Dr Rose     No need for pressures or transjugular approach. Will perform percutaneously to rule out rejection    Pathology aware that biopsy will be performed tonight Spoke with Dr Rose of transplant hepatology    No need for pressures or transjugular approach. Will perform percutaneously to rule out rejection    Pathology aware that biopsy will be performed tonight

## 2021-12-30 NOTE — PRE PROCEDURE NOTE - PRE PROCEDURE EVALUATION
Interventional Radiology    HPI: 64 yo M w/ PMHx JEAN cirrhosis s/p OLT 20 presents to IR for liver biopsy.    Allergies: codeine (Anaphylaxis)    Medications (Abx/Cardiac/Anticoagulation/Blood Products)  heparin   Injectable: 5000 Unit(s) SubCutaneous ( @ 05:15)  nystatin    Suspension: 527200 Unit(s) Oral ( @ 17:04)  trimethoprim   80 mG/sulfamethoxazole 400 m Tablet(s) Oral ( @ 11:03)  valGANciclovir: 450 milliGRAM(s) Oral ( @ 11:02)    Data:  185.4  95.3  T(C): 37.2  HR: 76  BP: 164/75  RR: 20  SpO2: 95%    Exam  General: No acute distress  Chest: Non labored breathing  Abdomen: Non-distended  Extremities: No swelling, warm    -WBC 2.59 / HgB 7.1 / Hct 22.7 / Plt 197  -Na 132 / Cl 99 / BUN 36 / Glucose 296  -K 4.5 / CO2 18 / Cr 1.84  - / Alk Phos 491 / T.Bili 0.2  -INR1.08    Plan: 66y Male presents for liver biopsy.  -Risks/Benefits/alternatives explained with the patient and/or healthcare proxy and witnessed informed consent obtained.

## 2021-12-30 NOTE — PROVIDER CONTACT NOTE (OTHER) - ACTION/TREATMENT ORDERED:
MD Notified. MD ordered to give morning dose of everolimus although everolimus labs have not yet resulted from 12/27/21.
MD notified
MD Notified. Administer medication as per MD, draw everolimus labs with morning labs.
MD notified and made aware
Provider will come assess pt when she has a chance.
MD notified and made aware  No further interventions at this time  Patient safety maintained and continue to monitor

## 2021-12-30 NOTE — PROVIDER CONTACT NOTE (OTHER) - NAME OF MD/NP/PA/DO NOTIFIED:
MD Pradeep Ramey
Johnathan Ordonez MD
Physician
MD Molina
MD Rica Ramey
Team 2
Unit leadership and bed board.
MD Pradeep Ramey

## 2021-12-30 NOTE — PROGRESS NOTE ADULT - ASSESSMENT
67 y/o M PMHx DDLT (07/2020, c/b post-op VRE bacteremia), prior ESBL e.coli prostate abscess, R heel OM (12/2020), C. 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 Currently await rehab placement. Recent CT chest showed possible worsening of COVID PNA, currently on solumedrol.

## 2021-12-31 LAB
ALBUMIN SERPL ELPH-MCNC: 2.7 G/DL — LOW (ref 3.3–5)
ALP SERPL-CCNC: 431 U/L — HIGH (ref 40–120)
ALT FLD-CCNC: 182 U/L — HIGH (ref 10–45)
ANION GAP SERPL CALC-SCNC: 11 MMOL/L — SIGNIFICANT CHANGE UP (ref 5–17)
AST SERPL-CCNC: 91 U/L — HIGH (ref 10–40)
BASOPHILS # BLD AUTO: 0 K/UL — SIGNIFICANT CHANGE UP (ref 0–0.2)
BASOPHILS NFR BLD AUTO: 0 % — SIGNIFICANT CHANGE UP (ref 0–2)
BILIRUB SERPL-MCNC: 0.2 MG/DL — SIGNIFICANT CHANGE UP (ref 0.2–1.2)
BUN SERPL-MCNC: 39 MG/DL — HIGH (ref 7–23)
CALCIUM SERPL-MCNC: 9 MG/DL — SIGNIFICANT CHANGE UP (ref 8.4–10.5)
CHLORIDE SERPL-SCNC: 101 MMOL/L — SIGNIFICANT CHANGE UP (ref 96–108)
CO2 SERPL-SCNC: 19 MMOL/L — LOW (ref 22–31)
CREAT SERPL-MCNC: 2.11 MG/DL — HIGH (ref 0.5–1.3)
EOSINOPHIL # BLD AUTO: 0 K/UL — SIGNIFICANT CHANGE UP (ref 0–0.5)
EOSINOPHIL NFR BLD AUTO: 0 % — SIGNIFICANT CHANGE UP (ref 0–6)
GLUCOSE BLDC GLUCOMTR-MCNC: 293 MG/DL — HIGH (ref 70–99)
GLUCOSE BLDC GLUCOMTR-MCNC: 322 MG/DL — HIGH (ref 70–99)
GLUCOSE BLDC GLUCOMTR-MCNC: 344 MG/DL — HIGH (ref 70–99)
GLUCOSE BLDC GLUCOMTR-MCNC: 383 MG/DL — HIGH (ref 70–99)
GLUCOSE SERPL-MCNC: 311 MG/DL — HIGH (ref 70–99)
HCT VFR BLD CALC: 23 % — LOW (ref 39–50)
HGB BLD-MCNC: 7.2 G/DL — LOW (ref 13–17)
IMM GRANULOCYTES NFR BLD AUTO: 0.6 % — SIGNIFICANT CHANGE UP (ref 0–1.5)
INR BLD: 1.01 RATIO — SIGNIFICANT CHANGE UP (ref 0.88–1.16)
LYMPHOCYTES # BLD AUTO: 0.42 K/UL — LOW (ref 1–3.3)
LYMPHOCYTES # BLD AUTO: 12.2 % — LOW (ref 13–44)
MAGNESIUM SERPL-MCNC: 2.1 MG/DL — SIGNIFICANT CHANGE UP (ref 1.6–2.6)
MCHC RBC-ENTMCNC: 25.8 PG — LOW (ref 27–34)
MCHC RBC-ENTMCNC: 31.3 GM/DL — LOW (ref 32–36)
MCV RBC AUTO: 82.4 FL — SIGNIFICANT CHANGE UP (ref 80–100)
MONOCYTES # BLD AUTO: 0.14 K/UL — SIGNIFICANT CHANGE UP (ref 0–0.9)
MONOCYTES NFR BLD AUTO: 4.1 % — SIGNIFICANT CHANGE UP (ref 2–14)
NEUTROPHILS # BLD AUTO: 2.85 K/UL — SIGNIFICANT CHANGE UP (ref 1.8–7.4)
NEUTROPHILS NFR BLD AUTO: 83.1 % — HIGH (ref 43–77)
NRBC # BLD: 0 /100 WBCS — SIGNIFICANT CHANGE UP (ref 0–0)
PHOSPHATE SERPL-MCNC: 2.7 MG/DL — SIGNIFICANT CHANGE UP (ref 2.5–4.5)
PLATELET # BLD AUTO: 211 K/UL — SIGNIFICANT CHANGE UP (ref 150–400)
POTASSIUM SERPL-MCNC: 4.9 MMOL/L — SIGNIFICANT CHANGE UP (ref 3.5–5.3)
POTASSIUM SERPL-SCNC: 4.9 MMOL/L — SIGNIFICANT CHANGE UP (ref 3.5–5.3)
PROT SERPL-MCNC: 6.2 G/DL — SIGNIFICANT CHANGE UP (ref 6–8.3)
PROTHROM AB SERPL-ACNC: 12.1 SEC — SIGNIFICANT CHANGE UP (ref 10.6–13.6)
RBC # BLD: 2.79 M/UL — LOW (ref 4.2–5.8)
RBC # FLD: 15.4 % — HIGH (ref 10.3–14.5)
SODIUM SERPL-SCNC: 131 MMOL/L — LOW (ref 135–145)
WBC # BLD: 3.43 K/UL — LOW (ref 3.8–10.5)
WBC # FLD AUTO: 3.43 K/UL — LOW (ref 3.8–10.5)

## 2021-12-31 PROCEDURE — 99233 SBSQ HOSP IP/OBS HIGH 50: CPT | Mod: GC

## 2021-12-31 PROCEDURE — 99232 SBSQ HOSP IP/OBS MODERATE 35: CPT | Mod: GC

## 2021-12-31 PROCEDURE — 99231 SBSQ HOSP IP/OBS SF/LOW 25: CPT

## 2021-12-31 RX ORDER — INSULIN LISPRO 100/ML
VIAL (ML) SUBCUTANEOUS AT BEDTIME
Refills: 0 | Status: DISCONTINUED | OUTPATIENT
Start: 2021-12-31 | End: 2022-01-06

## 2021-12-31 RX ORDER — INSULIN GLARGINE 100 [IU]/ML
20 INJECTION, SOLUTION SUBCUTANEOUS AT BEDTIME
Refills: 0 | Status: DISCONTINUED | OUTPATIENT
Start: 2021-12-31 | End: 2022-01-01

## 2021-12-31 RX ORDER — INSULIN LISPRO 100/ML
15 VIAL (ML) SUBCUTANEOUS
Refills: 0 | Status: DISCONTINUED | OUTPATIENT
Start: 2021-12-31 | End: 2022-01-01

## 2021-12-31 RX ORDER — HEPARIN SODIUM 5000 [USP'U]/ML
5000 INJECTION INTRAVENOUS; SUBCUTANEOUS EVERY 8 HOURS
Refills: 0 | Status: DISCONTINUED | OUTPATIENT
Start: 2022-01-01 | End: 2022-01-04

## 2021-12-31 RX ADMIN — ATORVASTATIN CALCIUM 40 MILLIGRAM(S): 80 TABLET, FILM COATED ORAL at 21:56

## 2021-12-31 RX ADMIN — EVEROLIMUS 4 MILLIGRAM(S): 10 TABLET ORAL at 05:17

## 2021-12-31 RX ADMIN — Medication 8: at 07:52

## 2021-12-31 RX ADMIN — Medication 11 UNIT(S): at 07:52

## 2021-12-31 RX ADMIN — Medication 500000 UNIT(S): at 17:00

## 2021-12-31 RX ADMIN — PANTOPRAZOLE SODIUM 40 MILLIGRAM(S): 20 TABLET, DELAYED RELEASE ORAL at 05:03

## 2021-12-31 RX ADMIN — VALGANCICLOVIR 450 MILLIGRAM(S): 450 TABLET, FILM COATED ORAL at 13:17

## 2021-12-31 RX ADMIN — Medication 500000 UNIT(S): at 05:03

## 2021-12-31 RX ADMIN — BENZOCAINE AND MENTHOL 1 LOZENGE: 5; 1 LIQUID ORAL at 21:56

## 2021-12-31 RX ADMIN — Medication 1 TABLET(S): at 13:17

## 2021-12-31 RX ADMIN — PANTOPRAZOLE SODIUM 40 MILLIGRAM(S): 20 TABLET, DELAYED RELEASE ORAL at 17:00

## 2021-12-31 RX ADMIN — Medication 6: at 12:06

## 2021-12-31 RX ADMIN — INSULIN GLARGINE 20 UNIT(S): 100 INJECTION, SOLUTION SUBCUTANEOUS at 21:56

## 2021-12-31 RX ADMIN — Medication 11 UNIT(S): at 12:05

## 2021-12-31 RX ADMIN — Medication 11 UNIT(S): at 16:58

## 2021-12-31 RX ADMIN — Medication 6 MILLIGRAM(S): at 05:03

## 2021-12-31 RX ADMIN — Medication 2: at 22:00

## 2021-12-31 RX ADMIN — Medication 10: at 16:59

## 2021-12-31 RX ADMIN — EVEROLIMUS 4 MILLIGRAM(S): 10 TABLET ORAL at 17:01

## 2021-12-31 RX ADMIN — Medication 500000 UNIT(S): at 23:10

## 2021-12-31 RX ADMIN — Medication 500000 UNIT(S): at 01:08

## 2021-12-31 RX ADMIN — Medication 100 MILLIGRAM(S): at 21:56

## 2021-12-31 RX ADMIN — Medication 500000 UNIT(S): at 13:18

## 2021-12-31 NOTE — PROGRESS NOTE ADULT - PROBLEM SELECTOR PLAN 1
Unclear etiology of the fall. Patient denies loss of consciousness. Low suspicion of a cardiogenic cause with unremarkable trops and EKG. Denies mechanical causes. Patient said that he was "out of it" when he fell but did not lose consciousness. He also said that he urinated on the floor but claims this was intentional. Neurologic cause?   - Neurology following  - Orthostatic blood pressures were positive for orthostatic hypotension  - TTE unremarkable  - c/w telemetry  - Infectious work up: Sputum culture, urine culture, blood cultures so far NGTD  - Pt consul-> rehab, likely 12/30. Unclear etiology of the fall. Patient denies loss of consciousness. Low suspicion of a cardiogenic cause with unremarkable trops and EKG. Denies mechanical causes. Patient said that he was "out of it" when he fell but did not lose consciousness. He also said that he urinated on the floor but claims this was intentional. Neurologic cause?   - Neurology following  - Orthostatic blood pressures were positive for orthostatic hypotension  - TTE unremarkable  - c/w telemetry  - Infectious work up: Sputum culture, urine culture, blood cultures so far NGTD  - Pt consul-> rehab

## 2021-12-31 NOTE — PROGRESS NOTE ADULT - ATTENDING COMMENTS
Pt is s/p liver BX , awaiting path report.  Pt states to feel better and is saturating well on RA now.  Pt is noted to have poor glycemic control on Steroid and with A1C of 1O.  Lantus and admelog increased and will need endo in or oupt for poorly controlled DM and now worse in setting on Steroid use.        Faustina Ferrera   Hospitalist

## 2021-12-31 NOTE — PROGRESS NOTE ADULT - PROBLEM SELECTOR PLAN 6
Mildly elevated admission AST/ALT at 45/66 respectively. Recent ultrasound showed good graft flow.   - Transplant hepatology recs appreciated  - RUQ US showed coarsened hepatic echotexture   - Will cont to monitor LFT's Mildly elevated admission AST/ALT at 45/66 respectively. Recent ultrasound showed good graft flow.   - Transplant hepatology recs appreciated  - RUQ US showed coarsened hepatic echotexture   - Liver biopsy (12/30), path pending   - Will cont to monitor LFT's

## 2021-12-31 NOTE — PROGRESS NOTE ADULT - ASSESSMENT
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 (course c/b VRE bacteremia, CNI toxicity to both tacro and cyclosporine; switched to everolimus 7/27/20), HLD, obesity, HFpEF presenting w/ fall    # elevated liver enzymes - newly elevated AST/ALT 12/28/21 and rising, unclear etiology, no abd pain, nl bili, DILI (2/2 remdesivir) possible, rejection (while holding MMF) possible, COVID induced ischemic injury possible (though lower levels than expected), will plan for liver biopsy to guide further management  # fall - likely 2/2 pulmonary symptoms; trop negative, TTE (poor study) negative, no events on tele, no EEG, no brain MRI, plan for outpt follow up  # pulmonary symptoms - patient w/ 1+ month of upper respiratory symptoms, of cough. Presented to ED 11/19/21 with negative workup; this current presentation now found to be COVID positive w/ CT chest showing patchy groundglass opacities. DDx for symptoms include COVID (though had symptoms of cough 1 month ago) vs everolimus pneumonitis. 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 Patient was seen by pulmonary, unable to differentiate between everolimus pneumonitis and COVID. Case was reviewed with radiology - believes that CT findings more consistent with COVID (patchy ground glass infiltrates) and not everolimus pneumonitis (no fibrosis). Would recommend treating for COVID (remdesivir) and continuing current immunosuppression with plan to re-image in a short interval. If patient's respiratory symptoms worsen, will readdress role of holding everolimus and possibly higher doses of steroids for pneumonitis.     # DM - poorly controlled DM, A1c 10.1    # anemia - patient w/ acute on chronic anemia, had dark stools, though unclear if melena (green around edges) and on iron; will continue w/ supportive management, iron supplementation, transfusions prn, no plans for endoscopic evaluation at this time    Recommendations:  - consult IR for liver biopsy  - f/u everolimus level  - c/w everolimus 4mg q12h  - hold cellcept   - c/w dexamethasone 6 PO qd  - check CMV PCR  - pending liver biopsy results will guide steroid dosing and restarting cellcept   - c/w bactrim and valcyte   - s/p remdesivir   - pantropazole 40mg PO BID  - no plans for endoscopic evaluation   - glycemic control (may benefit from endocrine consult; patient does not currently follow w/ endo)  - trend CMP, INR  - rest of care per primary team    Margaret Matthew MD  GI Fellow, PGY-4  Available via [employer] Teams    NON-URGENT CONSULTS:  Please email [email or web address]  AT NIGHT AND ON WEEKENDS:  Contact on-call GI fellow via answering service ([phone]) from 5pm-8am and on weekends/holidays  MONDAY-FRIDAY 8AM-5PM:  Pager# [phone] ([facility]) or [phone] ([facility])  GI Phone# [phone] ([facility])         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 (course c/b VRE bacteremia, CNI toxicity to both tacro and cyclosporine; switched to everolimus 7/27/20), HLD, obesity, HFpEF presenting w/ fall    # elevated liver enzymes - newly elevated AST/ALT 12/28/21 and rising, unclear etiology, no abd pain, nl bili, DILI (2/2 remdesivir) possible, rejection (while holding MMF) possible, COVID induced ischemic injury possible (though lower levels than expected), will plan for liver biopsy to guide further management; CMV PCR neg 12/21  # fall - likely 2/2 pulmonary symptoms; trop negative, TTE (poor study) negative, no events on tele, no EEG, no brain MRI, plan for outpt follow up  # pulmonary symptoms - patient w/ 1+ month of upper respiratory symptoms, of cough. Presented to ED 11/19/21 with negative workup; this current presentation now found to be COVID positive w/ CT chest showing patchy groundglass opacities. DDx for symptoms include COVID (though had symptoms of cough 1 month ago) vs everolimus pneumonitis. 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 Patient was seen by pulmonary, unable to differentiate between everolimus pneumonitis and COVID. Case was reviewed with radiology - believes that CT findings more consistent with COVID (patchy ground glass infiltrates) and not everolimus pneumonitis (no fibrosis). Would recommend treating for COVID (remdesivir) and continuing current immunosuppression with plan to re-image in a short interval. If patient's respiratory symptoms worsen, will readdress role of holding everolimus and possibly higher doses of steroids for pneumonitis.     # DM - poorly controlled DM, A1c 10.1    # anemia - patient w/ acute on chronic anemia, had dark stools, though unclear if melena (green around edges) and on iron; will continue w/ supportive management, iron supplementation, transfusions prn, no plans for endoscopic evaluation at this time    Recommendations:  - f/u liver biopsy results from 12/30  - pending liver biopsy results will guide steroid dosing and restarting cellcept   - f/u everolimus level from 12/31  - c/w everolimus 4mg q12h  - hold cellcept in the setting of ongoing COVID infection  - c/w dexamethasone 6 PO qd  - c/w bactrim and valcyte   - s/p remdesivir   - pantropazole 40mg PO BID  - no plans for endoscopic evaluation   - glycemic control (may benefit from endocrine consult; patient does not currently follow w/ endo)  - trend CMP, INR  - rest of care per primary team    Margaret Matthew MD  GI Fellow, PGY-4  Available via Microsoft Teams    NON-URGENT CONSULTS:  Please email [email or web address]  [email or web address]  AT NIGHT AND ON WEEKENDS:  Contact on-call GI fellow via answering service ([phone]) from 5pm-8am and on weekends/holidays  MONDAY-FRIDAY 8AM-5PM:  Pager# [phone] ([facility]) or [phone] ([facility])  GI Phone# [phone] ([facility])

## 2021-12-31 NOTE — PROGRESS NOTE ADULT - SUBJECTIVE AND OBJECTIVE BOX
Chief Complaint:  Patient is a 66y old  Male who presents with a chief complaint of Fall (30 Dec 2021 20:14)      Interval Events:   Reports feeling well. Denies any N/V/D/C, abd pain, melena or hematochezia.  s/p liver bx by IR yesterday 12/30.    Hospital Medications:  atorvastatin 40 milliGRAM(s) Oral at bedtime  benzocaine 15 mG/menthol 3.6 mG (Sugar-Free) Lozenge 1 Lozenge Oral every 6 hours PRN  benzonatate 100 milliGRAM(s) Oral every 8 hours PRN  dexAMETHasone     Tablet 6 milliGRAM(s) Oral daily  dextrose 40% Gel 15 Gram(s) Oral once  dextrose 5%. 1000 milliLiter(s) IV Continuous <Continuous>  dextrose 5%. 1000 milliLiter(s) IV Continuous <Continuous>  dextrose 50% Injectable 25 Gram(s) IV Push once  dextrose 50% Injectable 12.5 Gram(s) IV Push once  dextrose 50% Injectable 25 Gram(s) IV Push once  everolimus (ZORTRESS) 4 milliGRAM(s) Oral two times a day  glucagon  Injectable 1 milliGRAM(s) IntraMuscular once  insulin glargine Injectable (LANTUS) 18 Unit(s) SubCutaneous at bedtime  insulin lispro (ADMELOG) corrective regimen sliding scale   SubCutaneous three times a day before meals  insulin lispro Injectable (ADMELOG) 11 Unit(s) SubCutaneous three times a day before meals  nystatin    Suspension 338998 Unit(s) Oral four times a day  pantoprazole    Tablet 40 milliGRAM(s) Oral two times a day  trimethoprim   80 mG/sulfamethoxazole 400 mG 1 Tablet(s) Oral daily  valGANciclovir 450 milliGRAM(s) Oral daily      PMHX/PSHX:  Diabetes    Hypercholesteremia    Neuropathy    Hypertension    Renal stones    Fatty liver disease, nonalcoholic    Obesity    Hepatic encephalopathy    GERD with esophagitis    GIB (gastrointestinal bleeding)    No significant past surgical history    S/P cholecystectomy            ROS: 14 point ROS negative unless otherwise stated in subjective      PHYSICAL EXAM:     GENERAL:  No acute distress  HEENT:  NCAT, no scleral icterus  CHEST: no resp distress on RA  HEART:  RRR  ABDOMEN:  surgical site c/d/i, nontender, no r/g  EXTREMITIES:  No cyanosis, clubbing, or edema  SKIN:  No rash/erythema/ecchymoses/petechiae/wounds/abscess/warm/dry  NEURO:  Alert and oriented x 3, no asterixis, no tremor      Vital Signs:  Vital Signs Last 24 Hrs  T(C): 36.9 (30 Dec 2021 20:05), Max: 37.2 (30 Dec 2021 14:45)  T(F): 98.4 (30 Dec 2021 20:05), Max: 98.9 (30 Dec 2021 14:45)  HR: 80 (30 Dec 2021 20:05) (65 - 80)  BP: 145/72 (30 Dec 2021 20:05) (137/69 - 169/79)  BP(mean): 101 (30 Dec 2021 18:35) (82 - 101)  RR: 20 (30 Dec 2021 20:05) (14 - 20)  SpO2: 98% (30 Dec 2021 20:05) (93% - 98%)  Daily Height in cm: 185.42 (30 Dec 2021 17:40)    Daily     LABS:                        7.1    2.59  )-----------( 197      ( 30 Dec 2021 07:23 )             22.7     12-30    132<L>  |  99  |  36<H>  ----------------------------<  296<H>  4.5   |  18<L>  |  1.84<H>    Ca    9.0      30 Dec 2021 07:31  Phos  2.4     12-30  Mg     2.2     12-30    TPro  6.4  /  Alb  3.0<L>  /  TBili  0.2  /  DBili  x   /  AST  129<H>  /  ALT  190<H>  /  AlkPhos  491<H>  12-30    LIVER FUNCTIONS - ( 30 Dec 2021 07:31 )  Alb: 3.0 g/dL / Pro: 6.4 g/dL / ALK PHOS: 491 U/L / ALT: 190 U/L / AST: 129 U/L / GGT: x           PT/INR - ( 30 Dec 2021 07:32 )   PT: 12.9 sec;   INR: 1.08 ratio         PTT - ( 30 Dec 2021 07:32 )  PTT:31.5 sec        Imaging: No new abdominal imaging

## 2021-12-31 NOTE — PROGRESS NOTE ADULT - ATTENDING COMMENTS
65M, HTN, DM2, obesity, SHENG on noct. 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 after fall in the bathroom after he got up, felt dizzy and weak, likely due to  - COVID-19. No signif. cough here, mostly not wearing NC, good saturation; small pulm. infiltrates in lower and upper lung fields, thought more likely    COVID than everolimus pneumonitis. Had cough for a few weeks, but several negative PCRs until the one on admission, mild hypoxia sO2 95% on O2 2L/min during the day and CPAP at night for SHENG  - CKD, creat ~1.8 stable  - immunosuppression with everolimus 4 mg bid, off MMF due to infection, on prednisone 5 mg/d init., now dexamethasone for 10 days for COVID. On valcyte prophylaxis.  - slowly increasing AST/ALT/ALP until 12/30. Liver biopsy 12/30 preliminary information by Dr. 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 Last US 12/15: coarsened hepatic echotexture, no focal lesion. CCY. Extrahepatic bile ducts not visualized. DD: COVID-related liver injury, DILI due to remdesivir or atorvastatin with irregular distribution within the liver and missed by the biopsy.       -- continue current immunosuppression and prophylaxis with valgancyclovir, bactrim and nystatin  -- repeat everolimus trough level  -- daily CMP  -- f/u final read of liver biopsy

## 2021-12-31 NOTE — PROGRESS NOTE ADULT - SUBJECTIVE AND OBJECTIVE BOX
Vascular & Interventional Radiology    Interval history: No acute events.    Allergies: codeine (Anaphylaxis)    Medications (Abx/Cardiac/Anticoagulation/Blood Products)    heparin   Injectable: 5000 Unit(s) SubCutaneous ( @ 05:15)  nystatin    Suspension: 228131 Unit(s) Oral ( @ 17:00)  trimethoprim   80 mG/sulfamethoxazole 400 m Tablet(s) Oral ( @ 13:17)  valGANciclovir: 450 milliGRAM(s) Oral ( @ 13:17)    Data:    T(C): 36.8  HR: 88  BP: 136/68  RR: 18  SpO2: 96%    -WBC 3.43 / HgB 7.2 / Hct 23.0 / Plt 211  -Na 131 / Cl 101 / BUN 39 / Glucose 311  -K 4.9 / CO2 19 / Cr 2.11  - / Alk Phos 431 / T.Bili 0.2  -INR1.01      Assessment:  66y Male w/ JEAN cirrhosis s/p liver biopsy    Plan:   - f/u path  - trend labs  - reconsult as needed

## 2021-12-31 NOTE — PROGRESS NOTE ADULT - SUBJECTIVE AND OBJECTIVE BOX
PROGRESS NOTE:   Authored by Johnathan Ordonez MD  Pager: [facility] [phone]; [facility] [phone]    Patient is a 66y old  Male who presents with a chief complaint of Fall (31 Dec 2021 03:50)      SUBJECTIVE / OVERNIGHT EVENTS:    ADDITIONAL REVIEW OF SYSTEMS:    MEDICATIONS  (STANDING):  atorvastatin 40 milliGRAM(s) Oral at bedtime  dexAMETHasone     Tablet 6 milliGRAM(s) Oral daily  dextrose 40% Gel 15 Gram(s) Oral once  dextrose 5%. 1000 milliLiter(s) (50 mL/Hr) IV Continuous <Continuous>  dextrose 5%. 1000 milliLiter(s) (100 mL/Hr) IV Continuous <Continuous>  dextrose 50% Injectable 25 Gram(s) IV Push once  dextrose 50% Injectable 12.5 Gram(s) IV Push once  dextrose 50% Injectable 25 Gram(s) IV Push once  everolimus (ZORTRESS) 4 milliGRAM(s) Oral two times a day  glucagon  Injectable 1 milliGRAM(s) IntraMuscular once  insulin glargine Injectable (LANTUS) 18 Unit(s) SubCutaneous at bedtime  insulin lispro (ADMELOG) corrective regimen sliding scale   SubCutaneous three times a day before meals  insulin lispro Injectable (ADMELOG) 11 Unit(s) SubCutaneous three times a day before meals  nystatin    Suspension 946617 Unit(s) Oral four times a day  pantoprazole    Tablet 40 milliGRAM(s) Oral two times a day  trimethoprim   80 mG/sulfamethoxazole 400 mG 1 Tablet(s) Oral daily  valGANciclovir 450 milliGRAM(s) Oral daily    MEDICATIONS  (PRN):  benzocaine 15 mG/menthol 3.6 mG (Sugar-Free) Lozenge 1 Lozenge Oral every 6 hours PRN Sore Throat  benzonatate 100 milliGRAM(s) Oral every 8 hours PRN Cough      CAPILLARY BLOOD GLUCOSE      POCT Blood Glucose.: 322 mg/dL (31 Dec 2021 07:22)  POCT Blood Glucose.: 342 mg/dL (30 Dec 2021 21:03)  POCT Blood Glucose.: 249 mg/dL (30 Dec 2021 16:03)  POCT Blood Glucose.: 315 mg/dL (30 Dec 2021 11:13)    I&O's Summary    30 Dec 2021 07:01  -  31 Dec 2021 07:00  --------------------------------------------------------  IN: 360 mL / OUT: 2250 mL / NET: -1890 mL        PHYSICAL EXAM:  Vital Signs Last 24 Hrs  T(C): 36.6 (31 Dec 2021 04:23), Max: 37.2 (30 Dec 2021 14:45)  T(F): 97.8 (31 Dec 2021 04:23), Max: 98.9 (30 Dec 2021 14:45)  HR: 77 (31 Dec 2021 04:23) (65 - 80)  BP: 142/78 (31 Dec 2021 04:23) (137/69 - 164/75)  BP(mean): 101 (30 Dec 2021 18:35) (82 - 101)  RR: 20 (31 Dec 2021 04:23) (14 - 20)  SpO2: 92% (31 Dec 2021 04:23) (92% - 98%)    GENERAL: No acute distress, well-developed  HEAD:  Atraumatic, Normocephalic  EYES: EOMI, PERRLA, conjunctiva and sclera clear  NECK: Supple, no lymphadenopathy, no JVD  CHEST/LUNG: CTAB; No wheezes, rales, or rhonchi  HEART: Regular rate and rhythm; No murmurs, rubs, or gallops  ABDOMEN: Soft, non-tender, non-distended; normal bowel sounds, no organomegaly  EXTREMITIES:  2+ peripheral pulses b/l, No clubbing, cyanosis, or edema  NEUROLOGY: A&O x 3, no focal deficits  SKIN: No rashes or lesions    LABS:                        7.2    3.43  )-----------( 211      ( 31 Dec 2021 07:09 )             23.0     12-30    132<L>  |  99  |  36<H>  ----------------------------<  296<H>  4.5   |  18<L>  |  1.84<H>    Ca    9.0      30 Dec 2021 07:31  Phos  2.4     12-30  Mg     2.2     12-30    TPro  6.4  /  Alb  3.0<L>  /  TBili  0.2  /  DBili  x   /  AST  129<H>  /  ALT  190<H>  /  AlkPhos  491<H>  12-30    PT/INR - ( 30 Dec 2021 07:32 )   PT: 12.9 sec;   INR: 1.08 ratio         PTT - ( 30 Dec 2021 07:32 )  PTT:31.5 sec            RADIOLOGY & ADDITIONAL TESTS:  Results Reviewed:   Imaging Personally Reviewed:  Electrocardiogram Personally Reviewed:    COORDINATION OF CARE:  Care Discussed with Consultants/Other Providers [Y/N]:  Prior or Outpatient Records Reviewed [Y/N]:   PROGRESS NOTE:   Authored by Johnathan Ordonez MD  Pager: [facility] [phone]; [facility] [phone]    Patient is a 66y old  Male who presents with a chief complaint of Fall (31 Dec 2021 03:50)      SUBJECTIVE / OVERNIGHT EVENTS:  s/p liver biopsy with IR overnight. Denies any symptoms and currently on RA. Still having mild cough and sore throat but no SOB.     ADDITIONAL REVIEW OF SYSTEMS:    MEDICATIONS  (STANDING):  atorvastatin 40 milliGRAM(s) Oral at bedtime  dexAMETHasone     Tablet 6 milliGRAM(s) Oral daily  dextrose 40% Gel 15 Gram(s) Oral once  dextrose 5%. 1000 milliLiter(s) (50 mL/Hr) IV Continuous <Continuous>  dextrose 5%. 1000 milliLiter(s) (100 mL/Hr) IV Continuous <Continuous>  dextrose 50% Injectable 25 Gram(s) IV Push once  dextrose 50% Injectable 12.5 Gram(s) IV Push once  dextrose 50% Injectable 25 Gram(s) IV Push once  everolimus (ZORTRESS) 4 milliGRAM(s) Oral two times a day  glucagon  Injectable 1 milliGRAM(s) IntraMuscular once  insulin glargine Injectable (LANTUS) 18 Unit(s) SubCutaneous at bedtime  insulin lispro (ADMELOG) corrective regimen sliding scale   SubCutaneous three times a day before meals  insulin lispro Injectable (ADMELOG) 11 Unit(s) SubCutaneous three times a day before meals  nystatin    Suspension 567827 Unit(s) Oral four times a day  pantoprazole    Tablet 40 milliGRAM(s) Oral two times a day  trimethoprim   80 mG/sulfamethoxazole 400 mG 1 Tablet(s) Oral daily  valGANciclovir 450 milliGRAM(s) Oral daily    MEDICATIONS  (PRN):  benzocaine 15 mG/menthol 3.6 mG (Sugar-Free) Lozenge 1 Lozenge Oral every 6 hours PRN Sore Throat  benzonatate 100 milliGRAM(s) Oral every 8 hours PRN Cough      CAPILLARY BLOOD GLUCOSE      POCT Blood Glucose.: 322 mg/dL (31 Dec 2021 07:22)  POCT Blood Glucose.: 342 mg/dL (30 Dec 2021 21:03)  POCT Blood Glucose.: 249 mg/dL (30 Dec 2021 16:03)  POCT Blood Glucose.: 315 mg/dL (30 Dec 2021 11:13)    I&O's Summary    30 Dec 2021 07:01  -  31 Dec 2021 07:00  --------------------------------------------------------  IN: 360 mL / OUT: 2250 mL / NET: -1890 mL        PHYSICAL EXAM:  Vital Signs Last 24 Hrs  T(C): 36.6 (31 Dec 2021 04:23), Max: 37.2 (30 Dec 2021 14:45)  T(F): 97.8 (31 Dec 2021 04:23), Max: 98.9 (30 Dec 2021 14:45)  HR: 77 (31 Dec 2021 04:23) (65 - 80)  BP: 142/78 (31 Dec 2021 04:23) (137/69 - 164/75)  BP(mean): 101 (30 Dec 2021 18:35) (82 - 101)  RR: 20 (31 Dec 2021 04:23) (14 - 20)  SpO2: 92% (31 Dec 2021 04:23) (92% - 98%)    CONSTITUTIONAL: NAD, well-developed  HEENT: normal conjunctiva, PEERLA  RESPIRATORY: b/l decreased lung sounds  CARDIOVASCULAR: Regular rate and rhythm, normal S1 and S2, no murmur/rub/gallop;   ABDOMEN: No tenderness to palpation at all four quadrants, +BS  MUSCULOSKELETAL no clubbing or cyanosis of digits; no joint swelling or tenderness to palpation  EXTREMITIES No lower extremity edema; Peripheral pulses are 2+ bilaterally  SKIN: well-perfused, no dry skin, pt subjectively complained about tingling/numbness      LABS:                        7.2    3.43  )-----------( 211      ( 31 Dec 2021 07:09 )             23.0     12-30    132<L>  |  99  |  36<H>  ----------------------------<  296<H>  4.5   |  18<L>  |  1.84<H>    Ca    9.0      30 Dec 2021 07:31  Phos  2.4     12-30  Mg     2.2     12-30    TPro  6.4  /  Alb  3.0<L>  /  TBili  0.2  /  DBili  x   /  AST  129<H>  /  ALT  190<H>  /  AlkPhos  491<H>  12-30    PT/INR - ( 30 Dec 2021 07:32 )   PT: 12.9 sec;   INR: 1.08 ratio         PTT - ( 30 Dec 2021 07:32 )  PTT:31.5 sec            RADIOLOGY & ADDITIONAL TESTS:  Results Reviewed:   Imaging Personally Reviewed:  Electrocardiogram Personally Reviewed:    COORDINATION OF CARE:  Care Discussed with Consultants/Other Providers [Y/N]:  Prior or Outpatient Records Reviewed [Y/N]:

## 2021-12-31 NOTE — PROGRESS NOTE ADULT - ATTENDING COMMENTS
66y Male w/ JEAN cirrhosis s/p liver biopsy    Plan:   - f/u path  - trend labs  - reconsult as needed

## 2021-12-31 NOTE — PROGRESS NOTE ADULT - PROBLEM SELECTOR PLAN 3
CT chest concerning for possible beginnings of multifocal pneumonia. May be the beginnings of covid pneumonia vs everolimus side effect  - See above (coronavirus)  - Pulm consult: are lung findings secondary to covid or everolimus side effect? Unable to be determined by imaging  - Empiric antibiotics for five days (ceftriaxone 12/21 - 12/25) per ID  - CT chest showed possibly worsening COVID PNA 0

## 2022-01-01 ENCOUNTER — APPOINTMENT (OUTPATIENT)
Age: 67
End: 2022-01-01

## 2022-01-01 ENCOUNTER — LABORATORY RESULT (OUTPATIENT)
Age: 67
End: 2022-01-01

## 2022-01-01 ENCOUNTER — INPATIENT (INPATIENT)
Facility: HOSPITAL | Age: 67
LOS: 4 days | Discharge: HOME HEALTH | DRG: 617 | End: 2022-12-31
Attending: HOSPITALIST
Payer: MEDICARE

## 2022-01-01 ENCOUNTER — RESULT REVIEW (OUTPATIENT)
Age: 67
End: 2022-01-01

## 2022-01-01 ENCOUNTER — APPOINTMENT (OUTPATIENT)
Dept: HEPATOLOGY | Facility: CLINIC | Age: 67
End: 2022-01-01

## 2022-01-01 ENCOUNTER — TRANSCRIPTION ENCOUNTER (OUTPATIENT)
Age: 67
End: 2022-01-01

## 2022-01-01 ENCOUNTER — APPOINTMENT (OUTPATIENT)
Dept: HEMATOLOGY ONCOLOGY | Facility: CLINIC | Age: 67
End: 2022-01-01

## 2022-01-01 ENCOUNTER — APPOINTMENT (OUTPATIENT)
Dept: WOUND CARE | Facility: CLINIC | Age: 67
End: 2022-01-01

## 2022-01-01 ENCOUNTER — OUTPATIENT (OUTPATIENT)
Dept: OUTPATIENT SERVICES | Facility: HOSPITAL | Age: 67
LOS: 1 days | Discharge: HOME | End: 2022-01-01

## 2022-01-01 ENCOUNTER — INPATIENT (INPATIENT)
Facility: HOSPITAL | Age: 67
LOS: 6 days | Discharge: HOME | DRG: 617 | End: 2022-09-05
Attending: STUDENT IN AN ORGANIZED HEALTH CARE EDUCATION/TRAINING PROGRAM | Admitting: HOSPITALIST
Payer: MEDICARE

## 2022-01-01 ENCOUNTER — APPOINTMENT (OUTPATIENT)
Dept: WOUND CARE | Facility: CLINIC | Age: 67
End: 2022-01-01
Payer: MEDICARE

## 2022-01-01 ENCOUNTER — NON-APPOINTMENT (OUTPATIENT)
Age: 67
End: 2022-01-01

## 2022-01-01 ENCOUNTER — INPATIENT (INPATIENT)
Facility: HOSPITAL | Age: 67
LOS: 7 days | Discharge: HOME | DRG: 617 | End: 2022-11-11
Attending: INTERNAL MEDICINE | Admitting: INTERNAL MEDICINE
Payer: MEDICARE

## 2022-01-01 ENCOUNTER — APPOINTMENT (OUTPATIENT)
Dept: TRANSPLANT | Facility: CLINIC | Age: 67
End: 2022-01-01

## 2022-01-01 VITALS
OXYGEN SATURATION: 97 % | SYSTOLIC BLOOD PRESSURE: 147 MMHG | HEART RATE: 76 BPM | TEMPERATURE: 98 F | RESPIRATION RATE: 18 BRPM | DIASTOLIC BLOOD PRESSURE: 74 MMHG

## 2022-01-01 VITALS
OXYGEN SATURATION: 97 % | TEMPERATURE: 98 F | HEART RATE: 82 BPM | SYSTOLIC BLOOD PRESSURE: 132 MMHG | HEIGHT: 73 IN | RESPIRATION RATE: 18 BRPM | DIASTOLIC BLOOD PRESSURE: 69 MMHG | WEIGHT: 229.94 LBS

## 2022-01-01 VITALS
WEIGHT: 225 LBS | TEMPERATURE: 97.5 F | OXYGEN SATURATION: 96 % | RESPIRATION RATE: 14 BRPM | DIASTOLIC BLOOD PRESSURE: 66 MMHG | BODY MASS INDEX: 30.15 KG/M2 | HEIGHT: 72.5 IN | SYSTOLIC BLOOD PRESSURE: 106 MMHG | HEART RATE: 77 BPM

## 2022-01-01 VITALS
HEART RATE: 80 BPM | SYSTOLIC BLOOD PRESSURE: 152 MMHG | DIASTOLIC BLOOD PRESSURE: 67 MMHG | RESPIRATION RATE: 18 BRPM | TEMPERATURE: 98 F | OXYGEN SATURATION: 98 %

## 2022-01-01 VITALS
OXYGEN SATURATION: 99 % | DIASTOLIC BLOOD PRESSURE: 61 MMHG | RESPIRATION RATE: 16 BRPM | TEMPERATURE: 98 F | HEIGHT: 73 IN | HEART RATE: 62 BPM | SYSTOLIC BLOOD PRESSURE: 124 MMHG | WEIGHT: 250 LBS

## 2022-01-01 VITALS
TEMPERATURE: 97.8 F | OXYGEN SATURATION: 99 % | WEIGHT: 269 LBS | HEIGHT: 72.5 IN | BODY MASS INDEX: 36.04 KG/M2 | HEART RATE: 85 BPM | SYSTOLIC BLOOD PRESSURE: 134 MMHG | DIASTOLIC BLOOD PRESSURE: 71 MMHG

## 2022-01-01 VITALS
HEART RATE: 80 BPM | BODY MASS INDEX: 31.35 KG/M2 | OXYGEN SATURATION: 95 % | HEIGHT: 72.5 IN | WEIGHT: 234.02 LBS | SYSTOLIC BLOOD PRESSURE: 89 MMHG | DIASTOLIC BLOOD PRESSURE: 55 MMHG

## 2022-01-01 VITALS
SYSTOLIC BLOOD PRESSURE: 139 MMHG | DIASTOLIC BLOOD PRESSURE: 75 MMHG | TEMPERATURE: 98 F | HEART RATE: 72 BPM | RESPIRATION RATE: 18 BRPM | OXYGEN SATURATION: 99 %

## 2022-01-01 VITALS
SYSTOLIC BLOOD PRESSURE: 157 MMHG | HEART RATE: 69 BPM | OXYGEN SATURATION: 100 % | TEMPERATURE: 98 F | RESPIRATION RATE: 18 BRPM | DIASTOLIC BLOOD PRESSURE: 83 MMHG

## 2022-01-01 VITALS — TEMPERATURE: 97.3 F

## 2022-01-01 DIAGNOSIS — Z90.49 ACQUIRED ABSENCE OF OTHER SPECIFIED PARTS OF DIGESTIVE TRACT: Chronic | ICD-10-CM

## 2022-01-01 DIAGNOSIS — M86.9 OSTEOMYELITIS, UNSPECIFIED: ICD-10-CM

## 2022-01-01 DIAGNOSIS — I73.9 PERIPHERAL VASCULAR DISEASE, UNSPECIFIED: ICD-10-CM

## 2022-01-01 DIAGNOSIS — R79.89 OTHER SPECIFIED ABNORMAL FINDINGS OF BLOOD CHEMISTRY: ICD-10-CM

## 2022-01-01 DIAGNOSIS — D61.9 APLASTIC ANEMIA, UNSPECIFIED: ICD-10-CM

## 2022-01-01 DIAGNOSIS — Z79.899 OTHER LONG TERM (CURRENT) DRUG THERAPY: ICD-10-CM

## 2022-01-01 DIAGNOSIS — R09.89 OTHER SPECIFIED SYMPTOMS AND SIGNS INVOLVING THE CIRCULATORY AND RESPIRATORY SYSTEMS: ICD-10-CM

## 2022-01-01 DIAGNOSIS — L03.115 CELLULITIS OF RIGHT LOWER LIMB: ICD-10-CM

## 2022-01-01 DIAGNOSIS — S91.309A UNSPECIFIED OPEN WOUND, UNSPECIFIED FOOT, INITIAL ENCOUNTER: ICD-10-CM

## 2022-01-01 DIAGNOSIS — Z94.4 LIVER TRANSPLANT STATUS: ICD-10-CM

## 2022-01-01 DIAGNOSIS — E11.9 TYPE 2 DIABETES MELLITUS WITHOUT COMPLICATIONS: ICD-10-CM

## 2022-01-01 DIAGNOSIS — E87.20 ACIDOSIS, UNSPECIFIED: ICD-10-CM

## 2022-01-01 DIAGNOSIS — R19.7 DIARRHEA, UNSPECIFIED: ICD-10-CM

## 2022-01-01 DIAGNOSIS — Z29.9 ENCOUNTER FOR PROPHYLACTIC MEASURES, UNSPECIFIED: ICD-10-CM

## 2022-01-01 DIAGNOSIS — Z86.39 PERSONAL HISTORY OF OTHER ENDOCRINE, NUTRITIONAL AND METABOLIC DISEASE: ICD-10-CM

## 2022-01-01 DIAGNOSIS — Z79.4 LONG TERM (CURRENT) USE OF INSULIN: ICD-10-CM

## 2022-01-01 DIAGNOSIS — I82.412 ACUTE EMBOLISM AND THROMBOSIS OF LEFT FEMORAL VEIN: ICD-10-CM

## 2022-01-01 DIAGNOSIS — N18.4 CHRONIC KIDNEY DISEASE, STAGE 4 (SEVERE): ICD-10-CM

## 2022-01-01 DIAGNOSIS — D75.89 OTHER SPECIFIED DISEASES OF BLOOD AND BLOOD-FORMING ORGANS: ICD-10-CM

## 2022-01-01 DIAGNOSIS — D64.9 ANEMIA, UNSPECIFIED: ICD-10-CM

## 2022-01-01 DIAGNOSIS — Z87.438 PERSONAL HISTORY OF OTHER DISEASES OF MALE GENITAL ORGANS: ICD-10-CM

## 2022-01-01 DIAGNOSIS — N18.9 CHRONIC KIDNEY DISEASE, UNSPECIFIED: ICD-10-CM

## 2022-01-01 DIAGNOSIS — N40.0 BENIGN PROSTATIC HYPERPLASIA WITHOUT LOWER URINARY TRACT SYMPMS: ICD-10-CM

## 2022-01-01 DIAGNOSIS — Z01.818 ENCOUNTER FOR OTHER PREPROCEDURAL EXAMINATION: ICD-10-CM

## 2022-01-01 DIAGNOSIS — I50.32 CHRONIC DIASTOLIC (CONGESTIVE) HEART FAILURE: ICD-10-CM

## 2022-01-01 DIAGNOSIS — N18.32 CHRONIC KIDNEY DISEASE, STAGE 3B: ICD-10-CM

## 2022-01-01 DIAGNOSIS — D47.2 MONOCLONAL GAMMOPATHY: ICD-10-CM

## 2022-01-01 DIAGNOSIS — M85.10: ICD-10-CM

## 2022-01-01 DIAGNOSIS — G62.9 POLYNEUROPATHY, UNSPECIFIED: ICD-10-CM

## 2022-01-01 DIAGNOSIS — Z98.890 OTHER SPECIFIED POSTPROCEDURAL STATES: ICD-10-CM

## 2022-01-01 LAB
-  AMPICILLIN/SULBACTAM: SIGNIFICANT CHANGE UP
-  AMPICILLIN/SULBACTAM: SIGNIFICANT CHANGE UP
-  CEFAZOLIN: SIGNIFICANT CHANGE UP
-  CEFAZOLIN: SIGNIFICANT CHANGE UP
-  CLINDAMYCIN: SIGNIFICANT CHANGE UP
-  CLINDAMYCIN: SIGNIFICANT CHANGE UP
-  ERYTHROMYCIN: SIGNIFICANT CHANGE UP
-  ERYTHROMYCIN: SIGNIFICANT CHANGE UP
-  GENTAMICIN: SIGNIFICANT CHANGE UP
-  GENTAMICIN: SIGNIFICANT CHANGE UP
-  OXACILLIN: SIGNIFICANT CHANGE UP
-  OXACILLIN: SIGNIFICANT CHANGE UP
-  PENICILLIN: SIGNIFICANT CHANGE UP
-  PENICILLIN: SIGNIFICANT CHANGE UP
-  RIFAMPIN: SIGNIFICANT CHANGE UP
-  RIFAMPIN: SIGNIFICANT CHANGE UP
-  TETRACYCLINE: SIGNIFICANT CHANGE UP
-  TETRACYCLINE: SIGNIFICANT CHANGE UP
-  TRIMETHOPRIM/SULFAMETHOXAZOLE: SIGNIFICANT CHANGE UP
-  TRIMETHOPRIM/SULFAMETHOXAZOLE: SIGNIFICANT CHANGE UP
-  VANCOMYCIN: SIGNIFICANT CHANGE UP
-  VANCOMYCIN: SIGNIFICANT CHANGE UP
A1C WITH ESTIMATED AVERAGE GLUCOSE RESULT: 6.7 % — HIGH (ref 4–5.6)
A1C WITH ESTIMATED AVERAGE GLUCOSE RESULT: 8 % — HIGH (ref 4–5.6)
ALBUMIN SERPL ELPH-MCNC: 2.7 G/DL — LOW (ref 3.3–5)
ALBUMIN SERPL ELPH-MCNC: 2.9 G/DL — LOW (ref 3.3–5)
ALBUMIN SERPL ELPH-MCNC: 2.9 G/DL — LOW (ref 3.3–5)
ALBUMIN SERPL ELPH-MCNC: 3.1 G/DL — LOW (ref 3.3–5)
ALBUMIN SERPL ELPH-MCNC: 3.2 G/DL — LOW (ref 3.3–5)
ALBUMIN SERPL ELPH-MCNC: 3.3 G/DL — SIGNIFICANT CHANGE UP (ref 3.3–5)
ALBUMIN SERPL ELPH-MCNC: 3.5 G/DL — SIGNIFICANT CHANGE UP (ref 3.3–5)
ALBUMIN SERPL ELPH-MCNC: 3.7 G/DL
ALBUMIN SERPL ELPH-MCNC: 3.7 G/DL
ALBUMIN SERPL ELPH-MCNC: 3.9 G/DL
ALBUMIN SERPL ELPH-MCNC: 3.9 G/DL — SIGNIFICANT CHANGE UP (ref 3.3–5)
ALP BLD-CCNC: 114 U/L
ALP BLD-CCNC: 125 U/L
ALP BLD-CCNC: 162 U/L
ALP SERPL-CCNC: 111 U/L — SIGNIFICANT CHANGE UP (ref 40–120)
ALP SERPL-CCNC: 121 U/L — HIGH (ref 40–120)
ALP SERPL-CCNC: 125 U/L — HIGH (ref 40–120)
ALP SERPL-CCNC: 126 U/L — HIGH (ref 40–120)
ALP SERPL-CCNC: 133 U/L — HIGH (ref 40–120)
ALP SERPL-CCNC: 134 U/L — HIGH (ref 40–120)
ALP SERPL-CCNC: 143 U/L — HIGH (ref 40–120)
ALP SERPL-CCNC: 144 U/L — HIGH (ref 40–120)
ALP SERPL-CCNC: 145 U/L — HIGH (ref 40–120)
ALP SERPL-CCNC: 399 U/L — HIGH (ref 40–120)
ALP SERPL-CCNC: 81 U/L — SIGNIFICANT CHANGE UP (ref 40–120)
ALP SERPL-CCNC: 83 U/L — SIGNIFICANT CHANGE UP (ref 40–120)
ALP SERPL-CCNC: 87 U/L — SIGNIFICANT CHANGE UP (ref 40–120)
ALP SERPL-CCNC: 89 U/L — SIGNIFICANT CHANGE UP (ref 40–120)
ALP SERPL-CCNC: 93 U/L — SIGNIFICANT CHANGE UP (ref 40–120)
ALP SERPL-CCNC: 93 U/L — SIGNIFICANT CHANGE UP (ref 40–120)
ALP SERPL-CCNC: 98 U/L — SIGNIFICANT CHANGE UP (ref 40–120)
ALP SERPL-CCNC: 99 U/L — SIGNIFICANT CHANGE UP (ref 40–120)
ALT FLD-CCNC: 17 U/L — SIGNIFICANT CHANGE UP (ref 10–45)
ALT FLD-CCNC: 17 U/L — SIGNIFICANT CHANGE UP (ref 10–45)
ALT FLD-CCNC: 18 U/L — SIGNIFICANT CHANGE UP (ref 10–45)
ALT FLD-CCNC: 184 U/L — HIGH (ref 10–45)
ALT FLD-CCNC: 20 U/L — SIGNIFICANT CHANGE UP (ref 10–45)
ALT FLD-CCNC: 21 U/L — SIGNIFICANT CHANGE UP (ref 10–45)
ALT FLD-CCNC: 22 U/L — SIGNIFICANT CHANGE UP (ref 10–45)
ALT FLD-CCNC: 22 U/L — SIGNIFICANT CHANGE UP (ref 10–45)
ALT FLD-CCNC: 23 U/L — SIGNIFICANT CHANGE UP (ref 10–45)
ALT FLD-CCNC: 23 U/L — SIGNIFICANT CHANGE UP (ref 10–45)
ALT FLD-CCNC: 24 U/L — SIGNIFICANT CHANGE UP (ref 10–45)
ALT FLD-CCNC: 25 U/L — SIGNIFICANT CHANGE UP (ref 10–45)
ALT FLD-CCNC: 26 U/L — SIGNIFICANT CHANGE UP (ref 10–45)
ALT FLD-CCNC: 27 U/L — SIGNIFICANT CHANGE UP (ref 10–45)
ALT FLD-CCNC: 28 U/L — SIGNIFICANT CHANGE UP (ref 10–45)
ALT FLD-CCNC: 30 U/L — SIGNIFICANT CHANGE UP (ref 10–45)
ALT FLD-CCNC: 33 U/L — SIGNIFICANT CHANGE UP (ref 10–45)
ALT FLD-CCNC: 43 U/L — SIGNIFICANT CHANGE UP (ref 10–45)
ALT SERPL-CCNC: 11 U/L
ALT SERPL-CCNC: 15 U/L
ALT SERPL-CCNC: 40 U/L
ANION GAP SERPL CALC-SCNC: 10 MMOL/L — SIGNIFICANT CHANGE UP (ref 5–17)
ANION GAP SERPL CALC-SCNC: 11 MMOL/L
ANION GAP SERPL CALC-SCNC: 11 MMOL/L — SIGNIFICANT CHANGE UP (ref 5–17)
ANION GAP SERPL CALC-SCNC: 12 MMOL/L — SIGNIFICANT CHANGE UP (ref 5–17)
ANION GAP SERPL CALC-SCNC: 12 MMOL/L — SIGNIFICANT CHANGE UP (ref 5–17)
ANION GAP SERPL CALC-SCNC: 13 MMOL/L — SIGNIFICANT CHANGE UP (ref 5–17)
ANION GAP SERPL CALC-SCNC: 14 MMOL/L — SIGNIFICANT CHANGE UP (ref 5–17)
ANION GAP SERPL CALC-SCNC: 8 MMOL/L — SIGNIFICANT CHANGE UP (ref 5–17)
ANION GAP SERPL CALC-SCNC: 8 MMOL/L — SIGNIFICANT CHANGE UP (ref 5–17)
ANION GAP SERPL CALC-SCNC: 9 MMOL/L — SIGNIFICANT CHANGE UP (ref 5–17)
ANISOCYTOSIS BLD QL: SLIGHT — SIGNIFICANT CHANGE UP
ANISOCYTOSIS BLD QL: SLIGHT — SIGNIFICANT CHANGE UP
APPEARANCE UR: ABNORMAL
APPEARANCE: ABNORMAL
APTT BLD: 27.3 SEC — LOW (ref 27.5–35.5)
APTT BLD: 27.7 SEC — SIGNIFICANT CHANGE UP (ref 27.5–35.5)
APTT BLD: 27.9 SEC — SIGNIFICANT CHANGE UP (ref 27.5–35.5)
APTT BLD: 27.9 SEC — SIGNIFICANT CHANGE UP (ref 27.5–35.5)
APTT BLD: 28 SEC — SIGNIFICANT CHANGE UP (ref 27.5–35.5)
APTT BLD: 28.2 SEC — SIGNIFICANT CHANGE UP (ref 27.5–35.5)
APTT BLD: 28.2 SEC — SIGNIFICANT CHANGE UP (ref 27.5–35.5)
APTT BLD: 28.3 SEC — SIGNIFICANT CHANGE UP (ref 27.5–35.5)
APTT BLD: 28.6 SEC — SIGNIFICANT CHANGE UP (ref 27.5–35.5)
APTT BLD: 28.7 SEC — SIGNIFICANT CHANGE UP (ref 27.5–35.5)
APTT BLD: 29.1 SEC — SIGNIFICANT CHANGE UP (ref 27.5–35.5)
APTT BLD: 29.3 SEC — SIGNIFICANT CHANGE UP (ref 27.5–35.5)
APTT BLD: 29.4 SEC — SIGNIFICANT CHANGE UP (ref 27.5–35.5)
APTT BLD: 29.7 SEC — SIGNIFICANT CHANGE UP (ref 27.5–35.5)
APTT BLD: 30.4 SEC — SIGNIFICANT CHANGE UP (ref 27.5–35.5)
APTT BLD: 31 SEC — SIGNIFICANT CHANGE UP (ref 27.5–35.5)
AST SERPL-CCNC: 10 U/L
AST SERPL-CCNC: 11 U/L — SIGNIFICANT CHANGE UP (ref 10–40)
AST SERPL-CCNC: 12 U/L
AST SERPL-CCNC: 12 U/L — SIGNIFICANT CHANGE UP (ref 10–40)
AST SERPL-CCNC: 13 U/L — SIGNIFICANT CHANGE UP (ref 10–40)
AST SERPL-CCNC: 14 U/L — SIGNIFICANT CHANGE UP (ref 10–40)
AST SERPL-CCNC: 14 U/L — SIGNIFICANT CHANGE UP (ref 10–40)
AST SERPL-CCNC: 15 U/L — SIGNIFICANT CHANGE UP (ref 10–40)
AST SERPL-CCNC: 15 U/L — SIGNIFICANT CHANGE UP (ref 10–40)
AST SERPL-CCNC: 16 U/L — SIGNIFICANT CHANGE UP (ref 10–40)
AST SERPL-CCNC: 18 U/L — SIGNIFICANT CHANGE UP (ref 10–40)
AST SERPL-CCNC: 19 U/L — SIGNIFICANT CHANGE UP (ref 10–40)
AST SERPL-CCNC: 22 U/L — SIGNIFICANT CHANGE UP (ref 10–40)
AST SERPL-CCNC: 23 U/L — SIGNIFICANT CHANGE UP (ref 10–40)
AST SERPL-CCNC: 23 U/L — SIGNIFICANT CHANGE UP (ref 10–40)
AST SERPL-CCNC: 30 U/L
AST SERPL-CCNC: 76 U/L — HIGH (ref 10–40)
BACTERIA # UR AUTO: NEGATIVE — SIGNIFICANT CHANGE UP
BACTERIA SPEC CULT: NORMAL
BACTERIA UR CULT: NORMAL
BASE EXCESS BLDV CALC-SCNC: -13 MMOL/L — LOW (ref -2–3)
BASE EXCESS BLDV CALC-SCNC: -2.5 MMOL/L — LOW (ref -2–3)
BASE EXCESS BLDV CALC-SCNC: -5.4 MMOL/L — LOW (ref -2–3)
BASE EXCESS BLDV CALC-SCNC: -7.2 MMOL/L — LOW (ref -2–3)
BASE EXCESS BLDV CALC-SCNC: -9.3 MMOL/L — LOW (ref -2–3)
BASOPHILS # BLD AUTO: 0 K/UL — SIGNIFICANT CHANGE UP (ref 0–0.2)
BASOPHILS # BLD AUTO: 0 K/UL — SIGNIFICANT CHANGE UP (ref 0–0.2)
BASOPHILS # BLD AUTO: 0.03 K/UL — SIGNIFICANT CHANGE UP (ref 0–0.2)
BASOPHILS # BLD AUTO: 0.04 K/UL
BASOPHILS # BLD AUTO: 0.04 K/UL — SIGNIFICANT CHANGE UP (ref 0–0.2)
BASOPHILS # BLD AUTO: 0.04 K/UL — SIGNIFICANT CHANGE UP (ref 0–0.2)
BASOPHILS # BLD AUTO: 0.06 K/UL — SIGNIFICANT CHANGE UP (ref 0–0.2)
BASOPHILS # BLD AUTO: 0.08 K/UL — SIGNIFICANT CHANGE UP (ref 0–0.2)
BASOPHILS # BLD AUTO: 0.08 K/UL — SIGNIFICANT CHANGE UP (ref 0–0.2)
BASOPHILS # BLD AUTO: 0.1 K/UL — SIGNIFICANT CHANGE UP (ref 0–0.2)
BASOPHILS NFR BLD AUTO: 0 % — SIGNIFICANT CHANGE UP (ref 0–2)
BASOPHILS NFR BLD AUTO: 0.4 % — SIGNIFICANT CHANGE UP (ref 0–2)
BASOPHILS NFR BLD AUTO: 0.6 % — SIGNIFICANT CHANGE UP (ref 0–2)
BASOPHILS NFR BLD AUTO: 0.6 % — SIGNIFICANT CHANGE UP (ref 0–2)
BASOPHILS NFR BLD AUTO: 0.7 % — SIGNIFICANT CHANGE UP (ref 0–2)
BASOPHILS NFR BLD AUTO: 0.8 %
BASOPHILS NFR BLD AUTO: 0.9 % — SIGNIFICANT CHANGE UP (ref 0–2)
BASOPHILS NFR BLD AUTO: 1 % — SIGNIFICANT CHANGE UP (ref 0–2)
BASOPHILS NFR BLD AUTO: 1.1 % — SIGNIFICANT CHANGE UP (ref 0–2)
BASOPHILS NFR BLD AUTO: 1.2 % — SIGNIFICANT CHANGE UP (ref 0–2)
BASOPHILS NFR BLD AUTO: 1.2 % — SIGNIFICANT CHANGE UP (ref 0–2)
BASOPHILS NFR BLD AUTO: 1.3 % — SIGNIFICANT CHANGE UP (ref 0–2)
BASOPHILS NFR BLD AUTO: 1.3 % — SIGNIFICANT CHANGE UP (ref 0–2)
BASOPHILS NFR BLD AUTO: 2 % — SIGNIFICANT CHANGE UP (ref 0–2)
BILIRUB SERPL-MCNC: 0.1 MG/DL — LOW (ref 0.2–1.2)
BILIRUB SERPL-MCNC: 0.2 MG/DL
BILIRUB SERPL-MCNC: 0.2 MG/DL
BILIRUB SERPL-MCNC: 0.2 MG/DL — SIGNIFICANT CHANGE UP (ref 0.2–1.2)
BILIRUB SERPL-MCNC: 0.3 MG/DL
BILIRUB SERPL-MCNC: 0.3 MG/DL — SIGNIFICANT CHANGE UP (ref 0.2–1.2)
BILIRUB UR-MCNC: NEGATIVE — SIGNIFICANT CHANGE UP
BILIRUBIN URINE: NEGATIVE
BLD GP AB SCN SERPL QL: NEGATIVE — SIGNIFICANT CHANGE UP
BLOOD GAS VENOUS - CREATININE: SIGNIFICANT CHANGE UP MG/DL (ref 0.5–1.3)
BLOOD URINE: ABNORMAL
BUN SERPL-MCNC: 24 MG/DL — HIGH (ref 7–23)
BUN SERPL-MCNC: 24 MG/DL — HIGH (ref 7–23)
BUN SERPL-MCNC: 27 MG/DL — HIGH (ref 7–23)
BUN SERPL-MCNC: 29 MG/DL — HIGH (ref 7–23)
BUN SERPL-MCNC: 30 MG/DL
BUN SERPL-MCNC: 30 MG/DL — HIGH (ref 7–23)
BUN SERPL-MCNC: 31 MG/DL — HIGH (ref 7–23)
BUN SERPL-MCNC: 31 MG/DL — HIGH (ref 7–23)
BUN SERPL-MCNC: 32 MG/DL — HIGH (ref 7–23)
BUN SERPL-MCNC: 33 MG/DL — HIGH (ref 7–23)
BUN SERPL-MCNC: 33 MG/DL — HIGH (ref 7–23)
BUN SERPL-MCNC: 34 MG/DL — HIGH (ref 7–23)
BUN SERPL-MCNC: 35 MG/DL — HIGH (ref 7–23)
BUN SERPL-MCNC: 36 MG/DL
BUN SERPL-MCNC: 39 MG/DL — HIGH (ref 7–23)
BUN SERPL-MCNC: 40 MG/DL
BUN SERPL-MCNC: 40 MG/DL — HIGH (ref 7–23)
BUN SERPL-MCNC: 40 MG/DL — HIGH (ref 7–23)
BUN SERPL-MCNC: 42 MG/DL — HIGH (ref 7–23)
C DIFF GDH STL QL: NEGATIVE — SIGNIFICANT CHANGE UP
C DIFF GDH STL QL: SIGNIFICANT CHANGE UP
CA-I SERPL-SCNC: 1.07 MMOL/L — LOW (ref 1.15–1.33)
CA-I SERPL-SCNC: 1.23 MMOL/L — SIGNIFICANT CHANGE UP (ref 1.15–1.33)
CA-I SERPL-SCNC: 1.29 MMOL/L — SIGNIFICANT CHANGE UP (ref 1.15–1.33)
CA-I SERPL-SCNC: SIGNIFICANT CHANGE UP MMOL/L (ref 1.15–1.33)
CALCIUM SERPL-MCNC: 8.4 MG/DL — SIGNIFICANT CHANGE UP (ref 8.4–10.5)
CALCIUM SERPL-MCNC: 8.6 MG/DL — SIGNIFICANT CHANGE UP (ref 8.4–10.5)
CALCIUM SERPL-MCNC: 8.7 MG/DL — SIGNIFICANT CHANGE UP (ref 8.4–10.5)
CALCIUM SERPL-MCNC: 8.8 MG/DL — SIGNIFICANT CHANGE UP (ref 8.4–10.5)
CALCIUM SERPL-MCNC: 8.9 MG/DL — SIGNIFICANT CHANGE UP (ref 8.4–10.5)
CALCIUM SERPL-MCNC: 9 MG/DL
CALCIUM SERPL-MCNC: 9 MG/DL — SIGNIFICANT CHANGE UP (ref 8.4–10.5)
CALCIUM SERPL-MCNC: 9 MG/DL — SIGNIFICANT CHANGE UP (ref 8.4–10.5)
CALCIUM SERPL-MCNC: 9.1 MG/DL
CALCIUM SERPL-MCNC: 9.1 MG/DL — SIGNIFICANT CHANGE UP (ref 8.4–10.5)
CALCIUM SERPL-MCNC: 9.1 MG/DL — SIGNIFICANT CHANGE UP (ref 8.4–10.5)
CALCIUM SERPL-MCNC: 9.2 MG/DL
CALCIUM SERPL-MCNC: 9.2 MG/DL — SIGNIFICANT CHANGE UP (ref 8.4–10.5)
CHLORIDE BLDV-SCNC: 105 MMOL/L — SIGNIFICANT CHANGE UP (ref 96–108)
CHLORIDE BLDV-SCNC: 107 MMOL/L — SIGNIFICANT CHANGE UP (ref 96–108)
CHLORIDE BLDV-SCNC: 114 MMOL/L — HIGH (ref 96–108)
CHLORIDE BLDV-SCNC: 95 MMOL/L — LOW (ref 96–108)
CHLORIDE SERPL-SCNC: 102 MMOL/L — SIGNIFICANT CHANGE UP (ref 96–108)
CHLORIDE SERPL-SCNC: 104 MMOL/L
CHLORIDE SERPL-SCNC: 104 MMOL/L
CHLORIDE SERPL-SCNC: 105 MMOL/L — SIGNIFICANT CHANGE UP (ref 96–108)
CHLORIDE SERPL-SCNC: 106 MMOL/L — SIGNIFICANT CHANGE UP (ref 96–108)
CHLORIDE SERPL-SCNC: 107 MMOL/L — SIGNIFICANT CHANGE UP (ref 96–108)
CHLORIDE SERPL-SCNC: 108 MMOL/L — SIGNIFICANT CHANGE UP (ref 96–108)
CHLORIDE SERPL-SCNC: 108 MMOL/L — SIGNIFICANT CHANGE UP (ref 96–108)
CHLORIDE SERPL-SCNC: 109 MMOL/L
CHLORIDE SERPL-SCNC: 109 MMOL/L — HIGH (ref 96–108)
CHLORIDE SERPL-SCNC: 109 MMOL/L — HIGH (ref 96–108)
CHLORIDE SERPL-SCNC: 110 MMOL/L — HIGH (ref 96–108)
CMV DNA CSF QL NAA+PROBE: 1550 — SIGNIFICANT CHANGE UP
CMV DNA CSF QL NAA+PROBE: SIGNIFICANT CHANGE UP
CMV DNA CSF QL NAA+PROBE: SIGNIFICANT CHANGE UP
CMV DNA SPEC NAA+PROBE-LOG#: 3.19 LOG10IU/ML — HIGH
CMV DNA SPEC NAA+PROBE-LOG#: ABNORMAL LOG10IU/ML
CMV DNA SPEC NAA+PROBE-LOG#: SIGNIFICANT CHANGE UP LOG10IU/ML
CO2 BLDV-SCNC: 16 MMOL/L — LOW (ref 22–26)
CO2 BLDV-SCNC: 17 MMOL/L — LOW (ref 22–26)
CO2 BLDV-SCNC: 21 MMOL/L — LOW (ref 22–26)
CO2 BLDV-SCNC: 22 MMOL/L — SIGNIFICANT CHANGE UP (ref 22–26)
CO2 BLDV-SCNC: 26 MMOL/L — SIGNIFICANT CHANGE UP (ref 22–26)
CO2 SERPL-SCNC: 17 MMOL/L — LOW (ref 22–31)
CO2 SERPL-SCNC: 17 MMOL/L — LOW (ref 22–31)
CO2 SERPL-SCNC: 18 MMOL/L — LOW (ref 22–31)
CO2 SERPL-SCNC: 19 MMOL/L — LOW (ref 22–31)
CO2 SERPL-SCNC: 19 MMOL/L — LOW (ref 22–31)
CO2 SERPL-SCNC: 20 MMOL/L — LOW (ref 22–31)
CO2 SERPL-SCNC: 20 MMOL/L — LOW (ref 22–31)
CO2 SERPL-SCNC: 21 MMOL/L
CO2 SERPL-SCNC: 21 MMOL/L — LOW (ref 22–31)
CO2 SERPL-SCNC: 22 MMOL/L
CO2 SERPL-SCNC: 22 MMOL/L — SIGNIFICANT CHANGE UP (ref 22–31)
CO2 SERPL-SCNC: 23 MMOL/L — SIGNIFICANT CHANGE UP (ref 22–31)
CO2 SERPL-SCNC: 24 MMOL/L
CO2 SERPL-SCNC: 24 MMOL/L — SIGNIFICANT CHANGE UP (ref 22–31)
COLOR SPEC: SIGNIFICANT CHANGE UP
COLOR: YELLOW
COMMENT - URINE: SIGNIFICANT CHANGE UP
CREAT SERPL-MCNC: 1.56 MG/DL — HIGH (ref 0.5–1.3)
CREAT SERPL-MCNC: 1.6 MG/DL — HIGH (ref 0.5–1.3)
CREAT SERPL-MCNC: 1.65 MG/DL — HIGH (ref 0.5–1.3)
CREAT SERPL-MCNC: 1.73 MG/DL — HIGH (ref 0.5–1.3)
CREAT SERPL-MCNC: 1.78 MG/DL — HIGH (ref 0.5–1.3)
CREAT SERPL-MCNC: 1.78 MG/DL — HIGH (ref 0.5–1.3)
CREAT SERPL-MCNC: 1.79 MG/DL — HIGH (ref 0.5–1.3)
CREAT SERPL-MCNC: 1.81 MG/DL — HIGH (ref 0.5–1.3)
CREAT SERPL-MCNC: 1.82 MG/DL — HIGH (ref 0.5–1.3)
CREAT SERPL-MCNC: 1.84 MG/DL — HIGH (ref 0.5–1.3)
CREAT SERPL-MCNC: 1.86 MG/DL — HIGH (ref 0.5–1.3)
CREAT SERPL-MCNC: 1.88 MG/DL — HIGH (ref 0.5–1.3)
CREAT SERPL-MCNC: 1.89 MG/DL — HIGH (ref 0.5–1.3)
CREAT SERPL-MCNC: 1.89 MG/DL — HIGH (ref 0.5–1.3)
CREAT SERPL-MCNC: 1.91 MG/DL — HIGH (ref 0.5–1.3)
CREAT SERPL-MCNC: 1.91 MG/DL — HIGH (ref 0.5–1.3)
CREAT SERPL-MCNC: 1.95 MG/DL — HIGH (ref 0.5–1.3)
CREAT SERPL-MCNC: 1.96 MG/DL
CREAT SERPL-MCNC: 1.98 MG/DL — HIGH (ref 0.5–1.3)
CREAT SERPL-MCNC: 2.05 MG/DL — HIGH (ref 0.5–1.3)
CREAT SERPL-MCNC: 2.11 MG/DL
CREAT SERPL-MCNC: 2.14 MG/DL — HIGH (ref 0.5–1.3)
CREAT SERPL-MCNC: 2.15 MG/DL
CREAT SERPL-MCNC: 2.2 MG/DL — HIGH (ref 0.5–1.3)
CREAT SERPL-MCNC: 2.29 MG/DL — HIGH (ref 0.5–1.3)
CREAT SERPL-MCNC: 2.34 MG/DL — HIGH (ref 0.5–1.3)
CRP SERPL-MCNC: 180 MG/L — HIGH (ref 0–4)
CRP SERPL-MCNC: 38 MG/L — HIGH (ref 0–4)
CRP SERPL-MCNC: 44 MG/L — HIGH (ref 0–4)
CULTURE RESULTS: SIGNIFICANT CHANGE UP
DIFF PNL FLD: ABNORMAL
EGFR: 30 ML/MIN/1.73M2 — LOW
EGFR: 31 ML/MIN/1.73M2 — LOW
EGFR: 32 ML/MIN/1.73M2 — LOW
EGFR: 33 ML/MIN/1.73M2
EGFR: 33 ML/MIN/1.73M2 — LOW
EGFR: 34 ML/MIN/1.73M2
EGFR: 35 ML/MIN/1.73M2 — LOW
EGFR: 37 ML/MIN/1.73M2
EGFR: 37 ML/MIN/1.73M2 — LOW
EGFR: 37 ML/MIN/1.73M2 — LOW
EGFR: 38 ML/MIN/1.73M2 — LOW
EGFR: 38 ML/MIN/1.73M2 — LOW
EGFR: 39 ML/MIN/1.73M2 — LOW
EGFR: 40 ML/MIN/1.73M2 — LOW
EGFR: 40 ML/MIN/1.73M2 — LOW
EGFR: 41 ML/MIN/1.73M2 — LOW
EGFR: 41 ML/MIN/1.73M2 — LOW
EGFR: 42 ML/MIN/1.73M2 — LOW
EGFR: 42 ML/MIN/1.73M2 — LOW
EGFR: 43 ML/MIN/1.73M2 — LOW
EGFR: 46 ML/MIN/1.73M2 — LOW
EGFR: 47 ML/MIN/1.73M2 — LOW
EGFR: 48 ML/MIN/1.73M2 — LOW
ELLIPTOCYTES BLD QL SMEAR: SLIGHT — SIGNIFICANT CHANGE UP
ELLIPTOCYTES BLD QL SMEAR: SLIGHT — SIGNIFICANT CHANGE UP
EOSINOPHIL # BLD AUTO: 0 K/UL — SIGNIFICANT CHANGE UP (ref 0–0.5)
EOSINOPHIL # BLD AUTO: 0.03 K/UL — SIGNIFICANT CHANGE UP (ref 0–0.5)
EOSINOPHIL # BLD AUTO: 0.06 K/UL — SIGNIFICANT CHANGE UP (ref 0–0.5)
EOSINOPHIL # BLD AUTO: 0.07 K/UL — SIGNIFICANT CHANGE UP (ref 0–0.5)
EOSINOPHIL # BLD AUTO: 0.08 K/UL — SIGNIFICANT CHANGE UP (ref 0–0.5)
EOSINOPHIL # BLD AUTO: 0.09 K/UL
EOSINOPHIL # BLD AUTO: 0.09 K/UL — SIGNIFICANT CHANGE UP (ref 0–0.5)
EOSINOPHIL # BLD AUTO: 0.1 K/UL — SIGNIFICANT CHANGE UP (ref 0–0.5)
EOSINOPHIL # BLD AUTO: 0.12 K/UL — SIGNIFICANT CHANGE UP (ref 0–0.5)
EOSINOPHIL # BLD AUTO: 0.13 K/UL — SIGNIFICANT CHANGE UP (ref 0–0.5)
EOSINOPHIL # BLD AUTO: 0.14 K/UL — SIGNIFICANT CHANGE UP (ref 0–0.5)
EOSINOPHIL NFR BLD AUTO: 0 % — SIGNIFICANT CHANGE UP (ref 0–6)
EOSINOPHIL NFR BLD AUTO: 0.4 % — SIGNIFICANT CHANGE UP (ref 0–6)
EOSINOPHIL NFR BLD AUTO: 0.5 % — SIGNIFICANT CHANGE UP (ref 0–6)
EOSINOPHIL NFR BLD AUTO: 1 % — SIGNIFICANT CHANGE UP (ref 0–6)
EOSINOPHIL NFR BLD AUTO: 1.1 % — SIGNIFICANT CHANGE UP (ref 0–6)
EOSINOPHIL NFR BLD AUTO: 1.4 % — SIGNIFICANT CHANGE UP (ref 0–6)
EOSINOPHIL NFR BLD AUTO: 1.7 %
EOSINOPHIL NFR BLD AUTO: 1.7 % — SIGNIFICANT CHANGE UP (ref 0–6)
EOSINOPHIL NFR BLD AUTO: 1.9 % — SIGNIFICANT CHANGE UP (ref 0–6)
EOSINOPHIL NFR BLD AUTO: 2 % — SIGNIFICANT CHANGE UP (ref 0–6)
EOSINOPHIL NFR BLD AUTO: 2.2 % — SIGNIFICANT CHANGE UP (ref 0–6)
EOSINOPHIL NFR BLD AUTO: 2.6 % — SIGNIFICANT CHANGE UP (ref 0–6)
EOSINOPHIL NFR BLD AUTO: 3.7 % — SIGNIFICANT CHANGE UP (ref 0–6)
EOSINOPHIL NFR BLD AUTO: 4.2 % — SIGNIFICANT CHANGE UP (ref 0–6)
EPI CELLS # UR: 1 /HPF — SIGNIFICANT CHANGE UP
ERYTHROCYTE [SEDIMENTATION RATE] IN BLOOD: 106 MM/HR — HIGH (ref 0–20)
ERYTHROCYTE [SEDIMENTATION RATE] IN BLOOD: 90 MM/HR — HIGH (ref 0–20)
ERYTHROCYTE [SEDIMENTATION RATE] IN BLOOD: 99 MM/HR — HIGH (ref 0–20)
ESTIMATED AVERAGE GLUCOSE: 146 MG/DL — HIGH (ref 68–114)
ESTIMATED AVERAGE GLUCOSE: 183 MG/DL — HIGH (ref 68–114)
EVEROLIMUS BLD-MCNC: 5.8 NG/ML
EVEROLIMUS, WHOLE BLOOD RESULT: 4.1 NG/ML — SIGNIFICANT CHANGE UP (ref 3–8)
EVEROLIMUS, WHOLE BLOOD RESULT: 6.4 NG/ML — SIGNIFICANT CHANGE UP (ref 3–8)
EVEROLIMUS, WHOLE BLOOD RESULT: <0.5 NG/ML — LOW (ref 3–8)
FERRITIN SERPL-MCNC: 207 NG/ML
FERRITIN SERPL-MCNC: 261 NG/ML
FLUAV AG NPH QL: SIGNIFICANT CHANGE UP
FLUBV AG NPH QL: SIGNIFICANT CHANGE UP
FOLATE SERPL-MCNC: >20 NG/ML
GAS PNL BLDV: 133 MMOL/L — LOW (ref 136–145)
GAS PNL BLDV: 137 MMOL/L — SIGNIFICANT CHANGE UP (ref 136–145)
GAS PNL BLDV: 138 MMOL/L — SIGNIFICANT CHANGE UP (ref 136–145)
GAS PNL BLDV: 142 MMOL/L — SIGNIFICANT CHANGE UP (ref 136–145)
GAS PNL BLDV: SIGNIFICANT CHANGE UP
GLUCOSE BLDC GLUCOMTR-MCNC: 102 MG/DL — HIGH (ref 70–99)
GLUCOSE BLDC GLUCOMTR-MCNC: 107 MG/DL — HIGH (ref 70–99)
GLUCOSE BLDC GLUCOMTR-MCNC: 107 MG/DL — HIGH (ref 70–99)
GLUCOSE BLDC GLUCOMTR-MCNC: 117 MG/DL — HIGH (ref 70–99)
GLUCOSE BLDC GLUCOMTR-MCNC: 120 MG/DL — HIGH (ref 70–99)
GLUCOSE BLDC GLUCOMTR-MCNC: 131 MG/DL — HIGH (ref 70–99)
GLUCOSE BLDC GLUCOMTR-MCNC: 136 MG/DL — HIGH (ref 70–99)
GLUCOSE BLDC GLUCOMTR-MCNC: 139 MG/DL — HIGH (ref 70–99)
GLUCOSE BLDC GLUCOMTR-MCNC: 141 MG/DL — HIGH (ref 70–99)
GLUCOSE BLDC GLUCOMTR-MCNC: 142 MG/DL — HIGH (ref 70–99)
GLUCOSE BLDC GLUCOMTR-MCNC: 144 MG/DL — HIGH (ref 70–99)
GLUCOSE BLDC GLUCOMTR-MCNC: 149 MG/DL — HIGH (ref 70–99)
GLUCOSE BLDC GLUCOMTR-MCNC: 149 MG/DL — HIGH (ref 70–99)
GLUCOSE BLDC GLUCOMTR-MCNC: 154 MG/DL — HIGH (ref 70–99)
GLUCOSE BLDC GLUCOMTR-MCNC: 155 MG/DL — HIGH (ref 70–99)
GLUCOSE BLDC GLUCOMTR-MCNC: 157 MG/DL — HIGH (ref 70–99)
GLUCOSE BLDC GLUCOMTR-MCNC: 158 MG/DL — HIGH (ref 70–99)
GLUCOSE BLDC GLUCOMTR-MCNC: 159 MG/DL — HIGH (ref 70–99)
GLUCOSE BLDC GLUCOMTR-MCNC: 159 MG/DL — HIGH (ref 70–99)
GLUCOSE BLDC GLUCOMTR-MCNC: 160 MG/DL — HIGH (ref 70–99)
GLUCOSE BLDC GLUCOMTR-MCNC: 165 MG/DL — HIGH (ref 70–99)
GLUCOSE BLDC GLUCOMTR-MCNC: 167 MG/DL — HIGH (ref 70–99)
GLUCOSE BLDC GLUCOMTR-MCNC: 175 MG/DL — HIGH (ref 70–99)
GLUCOSE BLDC GLUCOMTR-MCNC: 178 MG/DL — HIGH (ref 70–99)
GLUCOSE BLDC GLUCOMTR-MCNC: 178 MG/DL — HIGH (ref 70–99)
GLUCOSE BLDC GLUCOMTR-MCNC: 179 MG/DL — HIGH (ref 70–99)
GLUCOSE BLDC GLUCOMTR-MCNC: 181 MG/DL — HIGH (ref 70–99)
GLUCOSE BLDC GLUCOMTR-MCNC: 182 MG/DL — HIGH (ref 70–99)
GLUCOSE BLDC GLUCOMTR-MCNC: 183 MG/DL — HIGH (ref 70–99)
GLUCOSE BLDC GLUCOMTR-MCNC: 185 MG/DL — HIGH (ref 70–99)
GLUCOSE BLDC GLUCOMTR-MCNC: 186 MG/DL — HIGH (ref 70–99)
GLUCOSE BLDC GLUCOMTR-MCNC: 187 MG/DL — HIGH (ref 70–99)
GLUCOSE BLDC GLUCOMTR-MCNC: 197 MG/DL — HIGH (ref 70–99)
GLUCOSE BLDC GLUCOMTR-MCNC: 200 MG/DL — HIGH (ref 70–99)
GLUCOSE BLDC GLUCOMTR-MCNC: 201 MG/DL — HIGH (ref 70–99)
GLUCOSE BLDC GLUCOMTR-MCNC: 202 MG/DL — HIGH (ref 70–99)
GLUCOSE BLDC GLUCOMTR-MCNC: 202 MG/DL — HIGH (ref 70–99)
GLUCOSE BLDC GLUCOMTR-MCNC: 206 MG/DL — HIGH (ref 70–99)
GLUCOSE BLDC GLUCOMTR-MCNC: 206 MG/DL — HIGH (ref 70–99)
GLUCOSE BLDC GLUCOMTR-MCNC: 209 MG/DL — HIGH (ref 70–99)
GLUCOSE BLDC GLUCOMTR-MCNC: 211 MG/DL — HIGH (ref 70–99)
GLUCOSE BLDC GLUCOMTR-MCNC: 212 MG/DL — HIGH (ref 70–99)
GLUCOSE BLDC GLUCOMTR-MCNC: 214 MG/DL — HIGH (ref 70–99)
GLUCOSE BLDC GLUCOMTR-MCNC: 215 MG/DL — HIGH (ref 70–99)
GLUCOSE BLDC GLUCOMTR-MCNC: 215 MG/DL — HIGH (ref 70–99)
GLUCOSE BLDC GLUCOMTR-MCNC: 216 MG/DL — HIGH (ref 70–99)
GLUCOSE BLDC GLUCOMTR-MCNC: 219 MG/DL — HIGH (ref 70–99)
GLUCOSE BLDC GLUCOMTR-MCNC: 219 MG/DL — HIGH (ref 70–99)
GLUCOSE BLDC GLUCOMTR-MCNC: 221 MG/DL — HIGH (ref 70–99)
GLUCOSE BLDC GLUCOMTR-MCNC: 222 MG/DL — HIGH (ref 70–99)
GLUCOSE BLDC GLUCOMTR-MCNC: 224 MG/DL — HIGH (ref 70–99)
GLUCOSE BLDC GLUCOMTR-MCNC: 225 MG/DL — HIGH (ref 70–99)
GLUCOSE BLDC GLUCOMTR-MCNC: 231 MG/DL — HIGH (ref 70–99)
GLUCOSE BLDC GLUCOMTR-MCNC: 232 MG/DL — HIGH (ref 70–99)
GLUCOSE BLDC GLUCOMTR-MCNC: 233 MG/DL — HIGH (ref 70–99)
GLUCOSE BLDC GLUCOMTR-MCNC: 236 MG/DL — HIGH (ref 70–99)
GLUCOSE BLDC GLUCOMTR-MCNC: 236 MG/DL — HIGH (ref 70–99)
GLUCOSE BLDC GLUCOMTR-MCNC: 240 MG/DL — HIGH (ref 70–99)
GLUCOSE BLDC GLUCOMTR-MCNC: 242 MG/DL — HIGH (ref 70–99)
GLUCOSE BLDC GLUCOMTR-MCNC: 247 MG/DL — HIGH (ref 70–99)
GLUCOSE BLDC GLUCOMTR-MCNC: 248 MG/DL — HIGH (ref 70–99)
GLUCOSE BLDC GLUCOMTR-MCNC: 261 MG/DL — HIGH (ref 70–99)
GLUCOSE BLDC GLUCOMTR-MCNC: 265 MG/DL — HIGH (ref 70–99)
GLUCOSE BLDC GLUCOMTR-MCNC: 268 MG/DL — HIGH (ref 70–99)
GLUCOSE BLDC GLUCOMTR-MCNC: 268 MG/DL — HIGH (ref 70–99)
GLUCOSE BLDC GLUCOMTR-MCNC: 277 MG/DL — HIGH (ref 70–99)
GLUCOSE BLDC GLUCOMTR-MCNC: 280 MG/DL — HIGH (ref 70–99)
GLUCOSE BLDC GLUCOMTR-MCNC: 282 MG/DL — HIGH (ref 70–99)
GLUCOSE BLDC GLUCOMTR-MCNC: 295 MG/DL — HIGH (ref 70–99)
GLUCOSE BLDC GLUCOMTR-MCNC: 296 MG/DL — HIGH (ref 70–99)
GLUCOSE BLDC GLUCOMTR-MCNC: 298 MG/DL — HIGH (ref 70–99)
GLUCOSE BLDC GLUCOMTR-MCNC: 299 MG/DL — HIGH (ref 70–99)
GLUCOSE BLDC GLUCOMTR-MCNC: 302 MG/DL — HIGH (ref 70–99)
GLUCOSE BLDC GLUCOMTR-MCNC: 304 MG/DL — HIGH (ref 70–99)
GLUCOSE BLDC GLUCOMTR-MCNC: 312 MG/DL — HIGH (ref 70–99)
GLUCOSE BLDC GLUCOMTR-MCNC: 318 MG/DL — HIGH (ref 70–99)
GLUCOSE BLDC GLUCOMTR-MCNC: 323 MG/DL — HIGH (ref 70–99)
GLUCOSE BLDC GLUCOMTR-MCNC: 339 MG/DL — HIGH (ref 70–99)
GLUCOSE BLDC GLUCOMTR-MCNC: 352 MG/DL — HIGH (ref 70–99)
GLUCOSE BLDC GLUCOMTR-MCNC: 359 MG/DL — HIGH (ref 70–99)
GLUCOSE BLDC GLUCOMTR-MCNC: 60 MG/DL — LOW (ref 70–99)
GLUCOSE BLDC GLUCOMTR-MCNC: 62 MG/DL — LOW (ref 70–99)
GLUCOSE BLDC GLUCOMTR-MCNC: 65 MG/DL — LOW (ref 70–99)
GLUCOSE BLDC GLUCOMTR-MCNC: 89 MG/DL — SIGNIFICANT CHANGE UP (ref 70–99)
GLUCOSE BLDC GLUCOMTR-MCNC: 92 MG/DL — SIGNIFICANT CHANGE UP (ref 70–99)
GLUCOSE BLDC GLUCOMTR-MCNC: 96 MG/DL — SIGNIFICANT CHANGE UP (ref 70–99)
GLUCOSE BLDC GLUCOMTR-MCNC: 98 MG/DL — SIGNIFICANT CHANGE UP (ref 70–99)
GLUCOSE BLDV-MCNC: 168 MG/DL — HIGH (ref 70–99)
GLUCOSE BLDV-MCNC: 179 MG/DL — HIGH (ref 70–99)
GLUCOSE BLDV-MCNC: 191 MG/DL — HIGH (ref 70–99)
GLUCOSE BLDV-MCNC: 193 MG/DL — HIGH (ref 70–99)
GLUCOSE QUALITATIVE U: ABNORMAL
GLUCOSE SERPL-MCNC: 109 MG/DL — HIGH (ref 70–99)
GLUCOSE SERPL-MCNC: 113 MG/DL — HIGH (ref 70–99)
GLUCOSE SERPL-MCNC: 124 MG/DL — HIGH (ref 70–99)
GLUCOSE SERPL-MCNC: 126 MG/DL — HIGH (ref 70–99)
GLUCOSE SERPL-MCNC: 126 MG/DL — HIGH (ref 70–99)
GLUCOSE SERPL-MCNC: 135 MG/DL — HIGH (ref 70–99)
GLUCOSE SERPL-MCNC: 147 MG/DL — HIGH (ref 70–99)
GLUCOSE SERPL-MCNC: 151 MG/DL — HIGH (ref 70–99)
GLUCOSE SERPL-MCNC: 153 MG/DL — HIGH (ref 70–99)
GLUCOSE SERPL-MCNC: 154 MG/DL
GLUCOSE SERPL-MCNC: 157 MG/DL — HIGH (ref 70–99)
GLUCOSE SERPL-MCNC: 163 MG/DL — HIGH (ref 70–99)
GLUCOSE SERPL-MCNC: 173 MG/DL — HIGH (ref 70–99)
GLUCOSE SERPL-MCNC: 175 MG/DL — HIGH (ref 70–99)
GLUCOSE SERPL-MCNC: 183 MG/DL — HIGH (ref 70–99)
GLUCOSE SERPL-MCNC: 185 MG/DL — HIGH (ref 70–99)
GLUCOSE SERPL-MCNC: 193 MG/DL — HIGH (ref 70–99)
GLUCOSE SERPL-MCNC: 199 MG/DL — HIGH (ref 70–99)
GLUCOSE SERPL-MCNC: 199 MG/DL — HIGH (ref 70–99)
GLUCOSE SERPL-MCNC: 201 MG/DL — HIGH (ref 70–99)
GLUCOSE SERPL-MCNC: 205 MG/DL — HIGH (ref 70–99)
GLUCOSE SERPL-MCNC: 228 MG/DL — HIGH (ref 70–99)
GLUCOSE SERPL-MCNC: 240 MG/DL — HIGH (ref 70–99)
GLUCOSE SERPL-MCNC: 245 MG/DL — HIGH (ref 70–99)
GLUCOSE SERPL-MCNC: 262 MG/DL — HIGH (ref 70–99)
GLUCOSE SERPL-MCNC: 274 MG/DL — HIGH (ref 70–99)
GLUCOSE SERPL-MCNC: 294 MG/DL
GLUCOSE SERPL-MCNC: 89 MG/DL
GLUCOSE UR QL: ABNORMAL
GRAM STN FLD: SIGNIFICANT CHANGE UP
HCO3 BLDV-SCNC: 15 MMOL/L — LOW (ref 22–29)
HCO3 BLDV-SCNC: 15 MMOL/L — LOW (ref 22–29)
HCO3 BLDV-SCNC: 20 MMOL/L — LOW (ref 22–29)
HCO3 BLDV-SCNC: 20 MMOL/L — LOW (ref 22–29)
HCO3 BLDV-SCNC: 24 MMOL/L — SIGNIFICANT CHANGE UP (ref 22–29)
HCT VFR BLD CALC: 23.8 % — LOW (ref 39–50)
HCT VFR BLD CALC: 24.2 % — LOW (ref 39–50)
HCT VFR BLD CALC: 25.3 % — LOW (ref 39–50)
HCT VFR BLD CALC: 25.6 % — LOW (ref 39–50)
HCT VFR BLD CALC: 26.5 % — LOW (ref 39–50)
HCT VFR BLD CALC: 26.7 % — LOW (ref 39–50)
HCT VFR BLD CALC: 27.4 % — LOW (ref 39–50)
HCT VFR BLD CALC: 28.5 % — LOW (ref 39–50)
HCT VFR BLD CALC: 28.8 % — LOW (ref 39–50)
HCT VFR BLD CALC: 31 % — LOW (ref 39–50)
HCT VFR BLD CALC: 31.8 % — LOW (ref 39–50)
HCT VFR BLD CALC: 31.8 % — LOW (ref 39–50)
HCT VFR BLD CALC: 32 %
HCT VFR BLD CALC: 32.2 % — LOW (ref 39–50)
HCT VFR BLD CALC: 32.6 % — LOW (ref 39–50)
HCT VFR BLD CALC: 32.6 % — LOW (ref 39–50)
HCT VFR BLD CALC: 32.9 % — LOW (ref 39–50)
HCT VFR BLD CALC: 32.9 % — LOW (ref 39–50)
HCT VFR BLD CALC: 34.1 % — LOW (ref 39–50)
HCT VFR BLD CALC: 34.6 % — LOW (ref 39–50)
HCT VFR BLD CALC: 35.3 % — LOW (ref 39–50)
HCT VFR BLD CALC: 35.3 % — LOW (ref 39–50)
HCT VFR BLD CALC: 35.4 % — LOW (ref 39–50)
HCT VFR BLD CALC: 35.6 % — LOW (ref 39–50)
HCT VFR BLD CALC: 36.7 % — LOW (ref 39–50)
HCT VFR BLD CALC: 37.5 % — LOW (ref 39–50)
HCT VFR BLD CALC: 39.6 % — SIGNIFICANT CHANGE UP (ref 39–50)
HCT VFR BLDA CALC: 22 % — LOW (ref 39–51)
HCT VFR BLDA CALC: 24 % — LOW (ref 39–51)
HCT VFR BLDA CALC: 34 % — LOW (ref 39–51)
HCT VFR BLDA CALC: 35 % — LOW (ref 39–51)
HGB BLD CALC-MCNC: 11.4 G/DL — LOW (ref 12.6–17.4)
HGB BLD CALC-MCNC: 11.7 G/DL — LOW (ref 12.6–17.4)
HGB BLD CALC-MCNC: 7.4 G/DL — LOW (ref 12.6–17.4)
HGB BLD CALC-MCNC: 7.9 G/DL — LOW (ref 12.6–17.4)
HGB BLD-MCNC: 10.2 G/DL — LOW (ref 13–17)
HGB BLD-MCNC: 10.2 G/DL — LOW (ref 13–17)
HGB BLD-MCNC: 10.3 G/DL
HGB BLD-MCNC: 10.3 G/DL — LOW (ref 13–17)
HGB BLD-MCNC: 10.3 G/DL — LOW (ref 13–17)
HGB BLD-MCNC: 10.4 G/DL — LOW (ref 13–17)
HGB BLD-MCNC: 10.5 G/DL — LOW (ref 13–17)
HGB BLD-MCNC: 10.5 G/DL — LOW (ref 13–17)
HGB BLD-MCNC: 10.6 G/DL — LOW (ref 13–17)
HGB BLD-MCNC: 11 G/DL — LOW (ref 13–17)
HGB BLD-MCNC: 11 G/DL — LOW (ref 13–17)
HGB BLD-MCNC: 11.2 G/DL — LOW (ref 13–17)
HGB BLD-MCNC: 11.4 G/DL — LOW (ref 13–17)
HGB BLD-MCNC: 11.5 G/DL — LOW (ref 13–17)
HGB BLD-MCNC: 11.8 G/DL — LOW (ref 13–17)
HGB BLD-MCNC: 12.6 G/DL — LOW (ref 13–17)
HGB BLD-MCNC: 7.4 G/DL — LOW (ref 13–17)
HGB BLD-MCNC: 7.6 G/DL — LOW (ref 13–17)
HGB BLD-MCNC: 7.9 G/DL — LOW (ref 13–17)
HGB BLD-MCNC: 8.1 G/DL — LOW (ref 13–17)
HGB BLD-MCNC: 8.2 G/DL — LOW (ref 13–17)
HGB BLD-MCNC: 8.2 G/DL — LOW (ref 13–17)
HGB BLD-MCNC: 8.5 G/DL — LOW (ref 13–17)
HGB BLD-MCNC: 8.9 G/DL — LOW (ref 13–17)
HGB BLD-MCNC: 9.4 G/DL — LOW (ref 13–17)
HGB BLD-MCNC: 9.9 G/DL — LOW (ref 13–17)
HYALINE CASTS # UR AUTO: 1 /LPF — SIGNIFICANT CHANGE UP (ref 0–2)
HYPOCHROMIA BLD QL: SLIGHT — SIGNIFICANT CHANGE UP
HYPOCHROMIA BLD QL: SLIGHT — SIGNIFICANT CHANGE UP
IMM GRANULOCYTES NFR BLD AUTO: 0.2 % — SIGNIFICANT CHANGE UP (ref 0–1.5)
IMM GRANULOCYTES NFR BLD AUTO: 0.3 % — SIGNIFICANT CHANGE UP (ref 0–1.5)
IMM GRANULOCYTES NFR BLD AUTO: 0.4 %
IMM GRANULOCYTES NFR BLD AUTO: 0.8 % — SIGNIFICANT CHANGE UP (ref 0–1.5)
IMM GRANULOCYTES NFR BLD AUTO: 0.9 % — SIGNIFICANT CHANGE UP (ref 0–0.9)
IMM GRANULOCYTES NFR BLD AUTO: 0.9 % — SIGNIFICANT CHANGE UP (ref 0–1.5)
IMM GRANULOCYTES NFR BLD AUTO: 1.2 % — HIGH (ref 0–0.9)
IMM GRANULOCYTES NFR BLD AUTO: 2.2 % — HIGH (ref 0–0.9)
IMM GRANULOCYTES NFR BLD AUTO: 5.1 % — HIGH (ref 0–1.5)
INR BLD: 0.97 RATIO — SIGNIFICANT CHANGE UP (ref 0.88–1.16)
INR BLD: 1.01 RATIO — SIGNIFICANT CHANGE UP (ref 0.88–1.16)
INR BLD: 1.01 RATIO — SIGNIFICANT CHANGE UP (ref 0.88–1.16)
INR BLD: 1.02 RATIO — SIGNIFICANT CHANGE UP (ref 0.88–1.16)
INR BLD: 1.03 RATIO — SIGNIFICANT CHANGE UP (ref 0.88–1.16)
INR BLD: 1.03 RATIO — SIGNIFICANT CHANGE UP (ref 0.88–1.16)
INR BLD: 1.05 RATIO — SIGNIFICANT CHANGE UP (ref 0.88–1.16)
INR BLD: 1.06 RATIO — SIGNIFICANT CHANGE UP (ref 0.88–1.16)
INR BLD: 1.07 RATIO — SIGNIFICANT CHANGE UP (ref 0.88–1.16)
INR BLD: 1.09 RATIO — SIGNIFICANT CHANGE UP (ref 0.88–1.16)
INR BLD: 1.15 RATIO — SIGNIFICANT CHANGE UP (ref 0.88–1.16)
INR BLD: 1.19 RATIO — HIGH (ref 0.88–1.16)
INR BLD: 1.2 RATIO — HIGH (ref 0.88–1.16)
INR PPP: 1.09 RATIO
IRON SATN MFR SERPL: 24 %
IRON SATN MFR SERPL: 26 %
IRON SERPL-MCNC: 42 UG/DL
IRON SERPL-MCNC: 45 UG/DL
KETONES UR-MCNC: NEGATIVE — SIGNIFICANT CHANGE UP
KETONES URINE: NEGATIVE
LACTATE BLDV-MCNC: 0.7 MMOL/L — SIGNIFICANT CHANGE UP (ref 0.5–2)
LACTATE BLDV-MCNC: 0.9 MMOL/L — SIGNIFICANT CHANGE UP (ref 0.5–2)
LACTATE BLDV-MCNC: 0.9 MMOL/L — SIGNIFICANT CHANGE UP (ref 0.5–2)
LACTATE BLDV-MCNC: 1.1 MMOL/L — SIGNIFICANT CHANGE UP (ref 0.5–2)
LACTATE BLDV-MCNC: 1.3 MMOL/L — SIGNIFICANT CHANGE UP (ref 0.5–2)
LEUKOCYTE ESTERASE UR-ACNC: ABNORMAL
LEUKOCYTE ESTERASE URINE: ABNORMAL
LYMPHOCYTES # BLD AUTO: 0.23 K/UL — LOW (ref 1–3.3)
LYMPHOCYTES # BLD AUTO: 0.8 K/UL — LOW (ref 1–3.3)
LYMPHOCYTES # BLD AUTO: 0.93 K/UL — LOW (ref 1–3.3)
LYMPHOCYTES # BLD AUTO: 0.95 K/UL — LOW (ref 1–3.3)
LYMPHOCYTES # BLD AUTO: 0.99 K/UL — LOW (ref 1–3.3)
LYMPHOCYTES # BLD AUTO: 1 K/UL — SIGNIFICANT CHANGE UP (ref 1–3.3)
LYMPHOCYTES # BLD AUTO: 1 K/UL — SIGNIFICANT CHANGE UP (ref 1–3.3)
LYMPHOCYTES # BLD AUTO: 1.06 K/UL — SIGNIFICANT CHANGE UP (ref 1–3.3)
LYMPHOCYTES # BLD AUTO: 1.08 K/UL
LYMPHOCYTES # BLD AUTO: 1.16 K/UL — SIGNIFICANT CHANGE UP (ref 1–3.3)
LYMPHOCYTES # BLD AUTO: 1.2 K/UL — SIGNIFICANT CHANGE UP (ref 1–3.3)
LYMPHOCYTES # BLD AUTO: 1.23 K/UL — SIGNIFICANT CHANGE UP (ref 1–3.3)
LYMPHOCYTES # BLD AUTO: 1.3 K/UL — SIGNIFICANT CHANGE UP (ref 1–3.3)
LYMPHOCYTES # BLD AUTO: 1.4 K/UL — SIGNIFICANT CHANGE UP (ref 1–3.3)
LYMPHOCYTES # BLD AUTO: 1.9 K/UL — SIGNIFICANT CHANGE UP (ref 1–3.3)
LYMPHOCYTES # BLD AUTO: 10.5 % — LOW (ref 13–44)
LYMPHOCYTES # BLD AUTO: 12.4 % — LOW (ref 13–44)
LYMPHOCYTES # BLD AUTO: 16.6 % — SIGNIFICANT CHANGE UP (ref 13–44)
LYMPHOCYTES # BLD AUTO: 17.4 % — SIGNIFICANT CHANGE UP (ref 13–44)
LYMPHOCYTES # BLD AUTO: 2 K/UL — SIGNIFICANT CHANGE UP (ref 1–3.3)
LYMPHOCYTES # BLD AUTO: 20.2 % — SIGNIFICANT CHANGE UP (ref 13–44)
LYMPHOCYTES # BLD AUTO: 24.8 % — SIGNIFICANT CHANGE UP (ref 13–44)
LYMPHOCYTES # BLD AUTO: 25.6 % — SIGNIFICANT CHANGE UP (ref 13–44)
LYMPHOCYTES # BLD AUTO: 26.9 % — SIGNIFICANT CHANGE UP (ref 13–44)
LYMPHOCYTES # BLD AUTO: 27 % — SIGNIFICANT CHANGE UP (ref 13–44)
LYMPHOCYTES # BLD AUTO: 28.4 % — SIGNIFICANT CHANGE UP (ref 13–44)
LYMPHOCYTES # BLD AUTO: 28.6 % — SIGNIFICANT CHANGE UP (ref 13–44)
LYMPHOCYTES # BLD AUTO: 30.1 % — SIGNIFICANT CHANGE UP (ref 13–44)
LYMPHOCYTES # BLD AUTO: 32.8 % — SIGNIFICANT CHANGE UP (ref 13–44)
LYMPHOCYTES # BLD AUTO: 40 % — SIGNIFICANT CHANGE UP (ref 13–44)
LYMPHOCYTES # BLD AUTO: 5.8 % — LOW (ref 13–44)
LYMPHOCYTES NFR BLD AUTO: 20.5 %
MACROCYTES BLD QL: SLIGHT — SIGNIFICANT CHANGE UP
MACROCYTES BLD QL: SLIGHT — SIGNIFICANT CHANGE UP
MAGNESIUM SERPL-MCNC: 1.8 MG/DL — SIGNIFICANT CHANGE UP (ref 1.6–2.6)
MAGNESIUM SERPL-MCNC: 1.8 MG/DL — SIGNIFICANT CHANGE UP (ref 1.6–2.6)
MAGNESIUM SERPL-MCNC: 1.9 MG/DL — SIGNIFICANT CHANGE UP (ref 1.6–2.6)
MAGNESIUM SERPL-MCNC: 2 MG/DL — SIGNIFICANT CHANGE UP (ref 1.6–2.6)
MAGNESIUM SERPL-MCNC: 2.1 MG/DL — SIGNIFICANT CHANGE UP (ref 1.6–2.6)
MAGNESIUM SERPL-MCNC: 2.1 MG/DL — SIGNIFICANT CHANGE UP (ref 1.6–2.6)
MAN DIFF?: NORMAL
MANUAL SMEAR VERIFICATION: SIGNIFICANT CHANGE UP
MCHC RBC-ENTMCNC: 25.3 PG — LOW (ref 27–34)
MCHC RBC-ENTMCNC: 25.6 PG — LOW (ref 27–34)
MCHC RBC-ENTMCNC: 25.6 PG — LOW (ref 27–34)
MCHC RBC-ENTMCNC: 25.9 PG — LOW (ref 27–34)
MCHC RBC-ENTMCNC: 26 PG — LOW (ref 27–34)
MCHC RBC-ENTMCNC: 26.2 PG — LOW (ref 27–34)
MCHC RBC-ENTMCNC: 26.8 PG — LOW (ref 27–34)
MCHC RBC-ENTMCNC: 27.5 PG — SIGNIFICANT CHANGE UP (ref 27–34)
MCHC RBC-ENTMCNC: 28.6 PG — SIGNIFICANT CHANGE UP (ref 27–34)
MCHC RBC-ENTMCNC: 28.7 PG — SIGNIFICANT CHANGE UP (ref 27–34)
MCHC RBC-ENTMCNC: 28.7 PG — SIGNIFICANT CHANGE UP (ref 27–34)
MCHC RBC-ENTMCNC: 28.8 PG
MCHC RBC-ENTMCNC: 28.8 PG — SIGNIFICANT CHANGE UP (ref 27–34)
MCHC RBC-ENTMCNC: 28.9 PG — SIGNIFICANT CHANGE UP (ref 27–34)
MCHC RBC-ENTMCNC: 29 PG — SIGNIFICANT CHANGE UP (ref 27–34)
MCHC RBC-ENTMCNC: 29.1 PG — SIGNIFICANT CHANGE UP (ref 27–34)
MCHC RBC-ENTMCNC: 29.1 PG — SIGNIFICANT CHANGE UP (ref 27–34)
MCHC RBC-ENTMCNC: 29.2 PG — SIGNIFICANT CHANGE UP (ref 27–34)
MCHC RBC-ENTMCNC: 29.2 PG — SIGNIFICANT CHANGE UP (ref 27–34)
MCHC RBC-ENTMCNC: 29.4 PG — SIGNIFICANT CHANGE UP (ref 27–34)
MCHC RBC-ENTMCNC: 29.6 PG — SIGNIFICANT CHANGE UP (ref 27–34)
MCHC RBC-ENTMCNC: 29.7 PG — SIGNIFICANT CHANGE UP (ref 27–34)
MCHC RBC-ENTMCNC: 29.8 PG — SIGNIFICANT CHANGE UP (ref 27–34)
MCHC RBC-ENTMCNC: 29.8 PG — SIGNIFICANT CHANGE UP (ref 27–34)
MCHC RBC-ENTMCNC: 30 PG — SIGNIFICANT CHANGE UP (ref 27–34)
MCHC RBC-ENTMCNC: 30.1 PG — SIGNIFICANT CHANGE UP (ref 27–34)
MCHC RBC-ENTMCNC: 30.6 GM/DL — LOW (ref 32–36)
MCHC RBC-ENTMCNC: 30.7 GM/DL — LOW (ref 32–36)
MCHC RBC-ENTMCNC: 30.7 GM/DL — LOW (ref 32–36)
MCHC RBC-ENTMCNC: 30.8 GM/DL — LOW (ref 32–36)
MCHC RBC-ENTMCNC: 30.9 GM/DL — LOW (ref 32–36)
MCHC RBC-ENTMCNC: 30.9 GM/DL — LOW (ref 32–36)
MCHC RBC-ENTMCNC: 31 GM/DL — LOW (ref 32–36)
MCHC RBC-ENTMCNC: 31 PG — SIGNIFICANT CHANGE UP (ref 27–34)
MCHC RBC-ENTMCNC: 31.2 GM/DL — LOW (ref 32–36)
MCHC RBC-ENTMCNC: 31.2 GM/DL — LOW (ref 32–36)
MCHC RBC-ENTMCNC: 31.5 GM/DL — LOW (ref 32–36)
MCHC RBC-ENTMCNC: 31.5 GM/DL — LOW (ref 32–36)
MCHC RBC-ENTMCNC: 31.6 G/DL — LOW (ref 32–36)
MCHC RBC-ENTMCNC: 31.6 GM/DL — LOW (ref 32–36)
MCHC RBC-ENTMCNC: 31.7 G/DL — LOW (ref 32–36)
MCHC RBC-ENTMCNC: 31.7 GM/DL — LOW (ref 32–36)
MCHC RBC-ENTMCNC: 31.7 GM/DL — LOW (ref 32–36)
MCHC RBC-ENTMCNC: 31.8 GM/DL — LOW (ref 32–36)
MCHC RBC-ENTMCNC: 31.9 G/DL — LOW (ref 32–36)
MCHC RBC-ENTMCNC: 31.9 GM/DL — LOW (ref 32–36)
MCHC RBC-ENTMCNC: 32.1 GM/DL — SIGNIFICANT CHANGE UP (ref 32–36)
MCHC RBC-ENTMCNC: 32.2 GM/DL
MCHC RBC-ENTMCNC: 32.2 GM/DL — SIGNIFICANT CHANGE UP (ref 32–36)
MCHC RBC-ENTMCNC: 32.3 G/DL — SIGNIFICANT CHANGE UP (ref 32–36)
MCHC RBC-ENTMCNC: 32.4 GM/DL — SIGNIFICANT CHANGE UP (ref 32–36)
MCHC RBC-ENTMCNC: 32.7 G/DL — SIGNIFICANT CHANGE UP (ref 32–36)
MCV RBC AUTO: 81 FL — SIGNIFICANT CHANGE UP (ref 80–100)
MCV RBC AUTO: 81.2 FL — SIGNIFICANT CHANGE UP (ref 80–100)
MCV RBC AUTO: 82.1 FL — SIGNIFICANT CHANGE UP (ref 80–100)
MCV RBC AUTO: 82.9 FL — SIGNIFICANT CHANGE UP (ref 80–100)
MCV RBC AUTO: 83.1 FL — SIGNIFICANT CHANGE UP (ref 80–100)
MCV RBC AUTO: 83.8 FL — SIGNIFICANT CHANGE UP (ref 80–100)
MCV RBC AUTO: 84.5 FL — SIGNIFICANT CHANGE UP (ref 80–100)
MCV RBC AUTO: 84.7 FL — SIGNIFICANT CHANGE UP (ref 80–100)
MCV RBC AUTO: 84.8 FL — SIGNIFICANT CHANGE UP (ref 80–100)
MCV RBC AUTO: 86.4 FL — SIGNIFICANT CHANGE UP (ref 80–100)
MCV RBC AUTO: 87.8 FL — SIGNIFICANT CHANGE UP (ref 80–100)
MCV RBC AUTO: 89.4 FL
MCV RBC AUTO: 90.8 FL — SIGNIFICANT CHANGE UP (ref 80–100)
MCV RBC AUTO: 91.3 FL — SIGNIFICANT CHANGE UP (ref 80–100)
MCV RBC AUTO: 91.9 FL — SIGNIFICANT CHANGE UP (ref 80–100)
MCV RBC AUTO: 92 FL — SIGNIFICANT CHANGE UP (ref 80–100)
MCV RBC AUTO: 92.4 FL — SIGNIFICANT CHANGE UP (ref 80–100)
MCV RBC AUTO: 92.5 FL — SIGNIFICANT CHANGE UP (ref 80–100)
MCV RBC AUTO: 92.5 FL — SIGNIFICANT CHANGE UP (ref 80–100)
MCV RBC AUTO: 92.7 FL — SIGNIFICANT CHANGE UP (ref 80–100)
MCV RBC AUTO: 93 FL — SIGNIFICANT CHANGE UP (ref 80–100)
MCV RBC AUTO: 93.1 FL — SIGNIFICANT CHANGE UP (ref 80–100)
MCV RBC AUTO: 93.2 FL — SIGNIFICANT CHANGE UP (ref 80–100)
MCV RBC AUTO: 93.8 FL — SIGNIFICANT CHANGE UP (ref 80–100)
MCV RBC AUTO: 97.9 FL — SIGNIFICANT CHANGE UP (ref 80–100)
MELD SCORE WITH DIALYSIS: 20 POINTS — SIGNIFICANT CHANGE UP
MELD SCORE WITHOUT DIALYSIS: 13 POINTS — SIGNIFICANT CHANGE UP
METHOD TYPE: SIGNIFICANT CHANGE UP
METHOD TYPE: SIGNIFICANT CHANGE UP
MICROCYTES BLD QL: SLIGHT — SIGNIFICANT CHANGE UP
MICROCYTES BLD QL: SLIGHT — SIGNIFICANT CHANGE UP
MONOCYTES # BLD AUTO: 0.08 K/UL — SIGNIFICANT CHANGE UP (ref 0–0.9)
MONOCYTES # BLD AUTO: 0.13 K/UL — SIGNIFICANT CHANGE UP (ref 0–0.9)
MONOCYTES # BLD AUTO: 0.2 K/UL — SIGNIFICANT CHANGE UP (ref 0–0.9)
MONOCYTES # BLD AUTO: 0.3 K/UL — SIGNIFICANT CHANGE UP (ref 0–0.9)
MONOCYTES # BLD AUTO: 0.3 K/UL — SIGNIFICANT CHANGE UP (ref 0–0.9)
MONOCYTES # BLD AUTO: 0.33 K/UL — SIGNIFICANT CHANGE UP (ref 0–0.9)
MONOCYTES # BLD AUTO: 0.4 K/UL — SIGNIFICANT CHANGE UP (ref 0–0.9)
MONOCYTES # BLD AUTO: 0.41 K/UL — SIGNIFICANT CHANGE UP (ref 0–0.9)
MONOCYTES # BLD AUTO: 0.44 K/UL — SIGNIFICANT CHANGE UP (ref 0–0.9)
MONOCYTES # BLD AUTO: 0.45 K/UL — SIGNIFICANT CHANGE UP (ref 0–0.9)
MONOCYTES # BLD AUTO: 0.5 K/UL — SIGNIFICANT CHANGE UP (ref 0–0.9)
MONOCYTES # BLD AUTO: 0.53 K/UL
MONOCYTES NFR BLD AUTO: 10 % — SIGNIFICANT CHANGE UP (ref 2–14)
MONOCYTES NFR BLD AUTO: 10 % — SIGNIFICANT CHANGE UP (ref 2–14)
MONOCYTES NFR BLD AUTO: 10.1 %
MONOCYTES NFR BLD AUTO: 10.4 % — SIGNIFICANT CHANGE UP (ref 2–14)
MONOCYTES NFR BLD AUTO: 2 % — SIGNIFICANT CHANGE UP (ref 2–14)
MONOCYTES NFR BLD AUTO: 3.1 % — SIGNIFICANT CHANGE UP (ref 2–14)
MONOCYTES NFR BLD AUTO: 3.4 % — SIGNIFICANT CHANGE UP (ref 2–14)
MONOCYTES NFR BLD AUTO: 3.7 % — SIGNIFICANT CHANGE UP (ref 2–14)
MONOCYTES NFR BLD AUTO: 5.3 % — SIGNIFICANT CHANGE UP (ref 2–14)
MONOCYTES NFR BLD AUTO: 6.8 % — SIGNIFICANT CHANGE UP (ref 2–14)
MONOCYTES NFR BLD AUTO: 6.8 % — SIGNIFICANT CHANGE UP (ref 2–14)
MONOCYTES NFR BLD AUTO: 7.6 % — SIGNIFICANT CHANGE UP (ref 2–14)
MONOCYTES NFR BLD AUTO: 7.6 % — SIGNIFICANT CHANGE UP (ref 2–14)
MONOCYTES NFR BLD AUTO: 9.1 % — SIGNIFICANT CHANGE UP (ref 2–14)
MONOCYTES NFR BLD AUTO: 9.3 % — SIGNIFICANT CHANGE UP (ref 2–14)
MONOCYTES NFR BLD AUTO: 9.9 % — SIGNIFICANT CHANGE UP (ref 2–14)
MRSA PCR RESULT.: SIGNIFICANT CHANGE UP
MRSA PCR RESULT.: SIGNIFICANT CHANGE UP
MYELOCYTES NFR BLD: 1 % — HIGH (ref 0–0)
NEUTROPHILS # BLD AUTO: 1.85 K/UL — SIGNIFICANT CHANGE UP (ref 1.8–7.4)
NEUTROPHILS # BLD AUTO: 2.01 K/UL — SIGNIFICANT CHANGE UP (ref 1.8–7.4)
NEUTROPHILS # BLD AUTO: 2.2 K/UL — SIGNIFICANT CHANGE UP (ref 1.8–7.4)
NEUTROPHILS # BLD AUTO: 2.25 K/UL — SIGNIFICANT CHANGE UP (ref 1.8–7.4)
NEUTROPHILS # BLD AUTO: 2.3 K/UL — SIGNIFICANT CHANGE UP (ref 1.8–7.4)
NEUTROPHILS # BLD AUTO: 2.55 K/UL — SIGNIFICANT CHANGE UP (ref 1.8–7.4)
NEUTROPHILS # BLD AUTO: 2.6 K/UL — SIGNIFICANT CHANGE UP (ref 1.8–7.4)
NEUTROPHILS # BLD AUTO: 3.2 K/UL — SIGNIFICANT CHANGE UP (ref 1.8–7.4)
NEUTROPHILS # BLD AUTO: 3.51 K/UL
NEUTROPHILS # BLD AUTO: 3.63 K/UL — SIGNIFICANT CHANGE UP (ref 1.8–7.4)
NEUTROPHILS # BLD AUTO: 3.66 K/UL — SIGNIFICANT CHANGE UP (ref 1.8–7.4)
NEUTROPHILS # BLD AUTO: 3.96 K/UL — SIGNIFICANT CHANGE UP (ref 1.8–7.4)
NEUTROPHILS # BLD AUTO: 5.2 K/UL — SIGNIFICANT CHANGE UP (ref 1.8–7.4)
NEUTROPHILS # BLD AUTO: 5.53 K/UL — SIGNIFICANT CHANGE UP (ref 1.8–7.4)
NEUTROPHILS # BLD AUTO: 6.8 K/UL — SIGNIFICANT CHANGE UP (ref 1.8–7.4)
NEUTROPHILS # BLD AUTO: 7.82 K/UL — HIGH (ref 1.8–7.4)
NEUTROPHILS NFR BLD AUTO: 46 % — SIGNIFICANT CHANGE UP (ref 43–77)
NEUTROPHILS NFR BLD AUTO: 55.3 % — SIGNIFICANT CHANGE UP (ref 43–77)
NEUTROPHILS NFR BLD AUTO: 55.8 % — SIGNIFICANT CHANGE UP (ref 43–77)
NEUTROPHILS NFR BLD AUTO: 56.3 % — SIGNIFICANT CHANGE UP (ref 43–77)
NEUTROPHILS NFR BLD AUTO: 57.7 % — SIGNIFICANT CHANGE UP (ref 43–77)
NEUTROPHILS NFR BLD AUTO: 59 % — SIGNIFICANT CHANGE UP (ref 43–77)
NEUTROPHILS NFR BLD AUTO: 59.6 % — SIGNIFICANT CHANGE UP (ref 43–77)
NEUTROPHILS NFR BLD AUTO: 62.2 % — SIGNIFICANT CHANGE UP (ref 43–77)
NEUTROPHILS NFR BLD AUTO: 64.8 % — SIGNIFICANT CHANGE UP (ref 43–77)
NEUTROPHILS NFR BLD AUTO: 66.5 %
NEUTROPHILS NFR BLD AUTO: 69.7 % — SIGNIFICANT CHANGE UP (ref 43–77)
NEUTROPHILS NFR BLD AUTO: 74.9 % — SIGNIFICANT CHANGE UP (ref 43–77)
NEUTROPHILS NFR BLD AUTO: 77.6 % — HIGH (ref 43–77)
NEUTROPHILS NFR BLD AUTO: 82.4 % — HIGH (ref 43–77)
NEUTROPHILS NFR BLD AUTO: 83.9 % — HIGH (ref 43–77)
NEUTROPHILS NFR BLD AUTO: 91.4 % — HIGH (ref 43–77)
NEUTS BAND # BLD: 0.9 % — SIGNIFICANT CHANGE UP (ref 0–8)
NITRITE UR-MCNC: NEGATIVE — SIGNIFICANT CHANGE UP
NITRITE URINE: NEGATIVE
NRBC # BLD: 0 /100 WBCS — SIGNIFICANT CHANGE UP (ref 0–0)
ORGANISM # SPEC MICROSCOPIC CNT: SIGNIFICANT CHANGE UP
OTHER CELLS CSF MANUAL: 8.5 ML/DL — LOW (ref 18–22)
OVALOCYTES BLD QL SMEAR: SLIGHT — SIGNIFICANT CHANGE UP
OVALOCYTES BLD QL SMEAR: SLIGHT — SIGNIFICANT CHANGE UP
PCO2 BLDV: 26 MMHG — LOW (ref 42–55)
PCO2 BLDV: 38 MMHG — LOW (ref 42–55)
PCO2 BLDV: 46 MMHG — SIGNIFICANT CHANGE UP (ref 42–55)
PCO2 BLDV: 47 MMHG — SIGNIFICANT CHANGE UP (ref 42–55)
PCO2 BLDV: 51 MMHG — SIGNIFICANT CHANGE UP (ref 42–55)
PH BLDV: 7.13 — CRITICAL LOW (ref 7.32–7.43)
PH BLDV: 7.24 — LOW (ref 7.32–7.43)
PH BLDV: 7.29 — LOW (ref 7.32–7.43)
PH BLDV: 7.33 — SIGNIFICANT CHANGE UP (ref 7.32–7.43)
PH BLDV: 7.37 — SIGNIFICANT CHANGE UP (ref 7.32–7.43)
PH UR: 6 — SIGNIFICANT CHANGE UP (ref 5–8)
PH URINE: 6
PHOSPHATE SERPL-MCNC: 2.1 MG/DL — LOW (ref 2.5–4.5)
PHOSPHATE SERPL-MCNC: 2.2 MG/DL — LOW (ref 2.5–4.5)
PHOSPHATE SERPL-MCNC: 2.3 MG/DL — LOW (ref 2.5–4.5)
PHOSPHATE SERPL-MCNC: 2.5 MG/DL — SIGNIFICANT CHANGE UP (ref 2.5–4.5)
PHOSPHATE SERPL-MCNC: 2.7 MG/DL — SIGNIFICANT CHANGE UP (ref 2.5–4.5)
PHOSPHATE SERPL-MCNC: 2.8 MG/DL — SIGNIFICANT CHANGE UP (ref 2.5–4.5)
PHOSPHATE SERPL-MCNC: 3 MG/DL — SIGNIFICANT CHANGE UP (ref 2.5–4.5)
PHOSPHATE SERPL-MCNC: 3.3 MG/DL — SIGNIFICANT CHANGE UP (ref 2.5–4.5)
PHOSPHATE SERPL-MCNC: 3.4 MG/DL — SIGNIFICANT CHANGE UP (ref 2.5–4.5)
PHOSPHATE SERPL-MCNC: 3.6 MG/DL — SIGNIFICANT CHANGE UP (ref 2.5–4.5)
PHOSPHATE SERPL-MCNC: 3.7 MG/DL — SIGNIFICANT CHANGE UP (ref 2.5–4.5)
PHOSPHATE SERPL-MCNC: 3.7 MG/DL — SIGNIFICANT CHANGE UP (ref 2.5–4.5)
PHOSPHATE SERPL-MCNC: 3.8 MG/DL — SIGNIFICANT CHANGE UP (ref 2.5–4.5)
PHOSPHATE SERPL-MCNC: 3.8 MG/DL — SIGNIFICANT CHANGE UP (ref 2.5–4.5)
PHOSPHATE SERPL-MCNC: 3.9 MG/DL — SIGNIFICANT CHANGE UP (ref 2.5–4.5)
PHOSPHATE SERPL-MCNC: 4 MG/DL — SIGNIFICANT CHANGE UP (ref 2.5–4.5)
PLAT MORPH BLD: NORMAL — SIGNIFICANT CHANGE UP
PLATELET # BLD AUTO: 187 K/UL — SIGNIFICANT CHANGE UP (ref 150–400)
PLATELET # BLD AUTO: 188 K/UL — SIGNIFICANT CHANGE UP (ref 150–400)
PLATELET # BLD AUTO: 190 K/UL — SIGNIFICANT CHANGE UP (ref 150–400)
PLATELET # BLD AUTO: 192 K/UL — SIGNIFICANT CHANGE UP (ref 150–400)
PLATELET # BLD AUTO: 193 K/UL — SIGNIFICANT CHANGE UP (ref 150–400)
PLATELET # BLD AUTO: 196 K/UL — SIGNIFICANT CHANGE UP (ref 150–400)
PLATELET # BLD AUTO: 200 K/UL — SIGNIFICANT CHANGE UP (ref 150–400)
PLATELET # BLD AUTO: 203 K/UL — SIGNIFICANT CHANGE UP (ref 150–400)
PLATELET # BLD AUTO: 204 K/UL — SIGNIFICANT CHANGE UP (ref 150–400)
PLATELET # BLD AUTO: 209 K/UL — SIGNIFICANT CHANGE UP (ref 150–400)
PLATELET # BLD AUTO: 214 K/UL — SIGNIFICANT CHANGE UP (ref 150–400)
PLATELET # BLD AUTO: 214 K/UL — SIGNIFICANT CHANGE UP (ref 150–400)
PLATELET # BLD AUTO: 219 K/UL — SIGNIFICANT CHANGE UP (ref 150–400)
PLATELET # BLD AUTO: 220 K/UL
PLATELET # BLD AUTO: 220 K/UL — SIGNIFICANT CHANGE UP (ref 150–400)
PLATELET # BLD AUTO: 220 K/UL — SIGNIFICANT CHANGE UP (ref 150–400)
PLATELET # BLD AUTO: 221 K/UL — SIGNIFICANT CHANGE UP (ref 150–400)
PLATELET # BLD AUTO: 224 K/UL — SIGNIFICANT CHANGE UP (ref 150–400)
PLATELET # BLD AUTO: 225 K/UL — SIGNIFICANT CHANGE UP (ref 150–400)
PLATELET # BLD AUTO: 228 K/UL — SIGNIFICANT CHANGE UP (ref 150–400)
PLATELET # BLD AUTO: 230 K/UL — SIGNIFICANT CHANGE UP (ref 150–400)
PLATELET # BLD AUTO: 238 K/UL — SIGNIFICANT CHANGE UP (ref 150–400)
PLATELET # BLD AUTO: 249 K/UL — SIGNIFICANT CHANGE UP (ref 150–400)
PLATELET # BLD AUTO: 255 K/UL — SIGNIFICANT CHANGE UP (ref 150–400)
PLATELET # BLD AUTO: 262 K/UL — SIGNIFICANT CHANGE UP (ref 150–400)
PLATELET # BLD AUTO: 266 K/UL — SIGNIFICANT CHANGE UP (ref 150–400)
PLATELET # BLD AUTO: 272 K/UL — SIGNIFICANT CHANGE UP (ref 150–400)
PLATELET # BLD AUTO: 275 K/UL — SIGNIFICANT CHANGE UP (ref 150–400)
PLATELET # BLD AUTO: 293 K/UL — SIGNIFICANT CHANGE UP (ref 150–400)
PO2 BLDV: 27 MMHG — SIGNIFICANT CHANGE UP (ref 25–45)
PO2 BLDV: 49 MMHG — HIGH (ref 25–45)
PO2 BLDV: 53 MMHG — HIGH (ref 25–45)
PO2 BLDV: 54 MMHG — HIGH (ref 25–45)
PO2 BLDV: 67 MMHG — HIGH (ref 25–45)
POIKILOCYTOSIS BLD QL AUTO: SLIGHT — SIGNIFICANT CHANGE UP
POIKILOCYTOSIS BLD QL AUTO: SLIGHT — SIGNIFICANT CHANGE UP
POLYCHROMASIA BLD QL SMEAR: SLIGHT — SIGNIFICANT CHANGE UP
POLYCHROMASIA BLD QL SMEAR: SLIGHT — SIGNIFICANT CHANGE UP
POTASSIUM BLDV-SCNC: 3.1 MMOL/L — LOW (ref 3.5–5.1)
POTASSIUM BLDV-SCNC: 3.3 MMOL/L — LOW (ref 3.5–5.1)
POTASSIUM BLDV-SCNC: 4.5 MMOL/L — SIGNIFICANT CHANGE UP (ref 3.5–5.1)
POTASSIUM BLDV-SCNC: 5.1 MMOL/L — SIGNIFICANT CHANGE UP (ref 3.5–5.1)
POTASSIUM SERPL-MCNC: 3.6 MMOL/L — SIGNIFICANT CHANGE UP (ref 3.5–5.3)
POTASSIUM SERPL-MCNC: 3.7 MMOL/L — SIGNIFICANT CHANGE UP (ref 3.5–5.3)
POTASSIUM SERPL-MCNC: 3.8 MMOL/L — SIGNIFICANT CHANGE UP (ref 3.5–5.3)
POTASSIUM SERPL-MCNC: 3.8 MMOL/L — SIGNIFICANT CHANGE UP (ref 3.5–5.3)
POTASSIUM SERPL-MCNC: 3.9 MMOL/L — SIGNIFICANT CHANGE UP (ref 3.5–5.3)
POTASSIUM SERPL-MCNC: 4.1 MMOL/L — SIGNIFICANT CHANGE UP (ref 3.5–5.3)
POTASSIUM SERPL-MCNC: 4.1 MMOL/L — SIGNIFICANT CHANGE UP (ref 3.5–5.3)
POTASSIUM SERPL-MCNC: 4.2 MMOL/L — SIGNIFICANT CHANGE UP (ref 3.5–5.3)
POTASSIUM SERPL-MCNC: 4.3 MMOL/L — SIGNIFICANT CHANGE UP (ref 3.5–5.3)
POTASSIUM SERPL-MCNC: 4.4 MMOL/L — SIGNIFICANT CHANGE UP (ref 3.5–5.3)
POTASSIUM SERPL-MCNC: 4.5 MMOL/L — SIGNIFICANT CHANGE UP (ref 3.5–5.3)
POTASSIUM SERPL-MCNC: 4.5 MMOL/L — SIGNIFICANT CHANGE UP (ref 3.5–5.3)
POTASSIUM SERPL-MCNC: 5 MMOL/L — SIGNIFICANT CHANGE UP (ref 3.5–5.3)
POTASSIUM SERPL-MCNC: 5.1 MMOL/L — SIGNIFICANT CHANGE UP (ref 3.5–5.3)
POTASSIUM SERPL-SCNC: 3.6 MMOL/L — SIGNIFICANT CHANGE UP (ref 3.5–5.3)
POTASSIUM SERPL-SCNC: 3.7 MMOL/L — SIGNIFICANT CHANGE UP (ref 3.5–5.3)
POTASSIUM SERPL-SCNC: 3.8 MMOL/L — SIGNIFICANT CHANGE UP (ref 3.5–5.3)
POTASSIUM SERPL-SCNC: 3.8 MMOL/L — SIGNIFICANT CHANGE UP (ref 3.5–5.3)
POTASSIUM SERPL-SCNC: 3.9 MMOL/L — SIGNIFICANT CHANGE UP (ref 3.5–5.3)
POTASSIUM SERPL-SCNC: 4.1 MMOL/L — SIGNIFICANT CHANGE UP (ref 3.5–5.3)
POTASSIUM SERPL-SCNC: 4.1 MMOL/L — SIGNIFICANT CHANGE UP (ref 3.5–5.3)
POTASSIUM SERPL-SCNC: 4.2 MMOL/L — SIGNIFICANT CHANGE UP (ref 3.5–5.3)
POTASSIUM SERPL-SCNC: 4.3 MMOL/L
POTASSIUM SERPL-SCNC: 4.3 MMOL/L — SIGNIFICANT CHANGE UP (ref 3.5–5.3)
POTASSIUM SERPL-SCNC: 4.4 MMOL/L — SIGNIFICANT CHANGE UP (ref 3.5–5.3)
POTASSIUM SERPL-SCNC: 4.5 MMOL/L
POTASSIUM SERPL-SCNC: 4.5 MMOL/L — SIGNIFICANT CHANGE UP (ref 3.5–5.3)
POTASSIUM SERPL-SCNC: 4.5 MMOL/L — SIGNIFICANT CHANGE UP (ref 3.5–5.3)
POTASSIUM SERPL-SCNC: 5 MMOL/L — SIGNIFICANT CHANGE UP (ref 3.5–5.3)
POTASSIUM SERPL-SCNC: 5.1 MMOL/L — SIGNIFICANT CHANGE UP (ref 3.5–5.3)
POTASSIUM SERPL-SCNC: 5.8 MMOL/L
PROT SERPL-MCNC: 6 G/DL — SIGNIFICANT CHANGE UP (ref 6–8.3)
PROT SERPL-MCNC: 6.2 G/DL — SIGNIFICANT CHANGE UP (ref 6–8.3)
PROT SERPL-MCNC: 6.3 G/DL — SIGNIFICANT CHANGE UP (ref 6–8.3)
PROT SERPL-MCNC: 6.4 G/DL — SIGNIFICANT CHANGE UP (ref 6–8.3)
PROT SERPL-MCNC: 6.5 G/DL — SIGNIFICANT CHANGE UP (ref 6–8.3)
PROT SERPL-MCNC: 6.6 G/DL
PROT SERPL-MCNC: 6.6 G/DL — SIGNIFICANT CHANGE UP (ref 6–8.3)
PROT SERPL-MCNC: 6.8 G/DL
PROT SERPL-MCNC: 6.8 G/DL
PROT SERPL-MCNC: 6.9 G/DL — SIGNIFICANT CHANGE UP (ref 6–8.3)
PROT SERPL-MCNC: 7 G/DL — SIGNIFICANT CHANGE UP (ref 6–8.3)
PROT SERPL-MCNC: 7.1 G/DL — SIGNIFICANT CHANGE UP (ref 6–8.3)
PROT SERPL-MCNC: 7.2 G/DL — SIGNIFICANT CHANGE UP (ref 6–8.3)
PROT UR-MCNC: ABNORMAL
PROTEIN URINE: ABNORMAL
PROTHROM AB SERPL-ACNC: 11.3 SEC — SIGNIFICANT CHANGE UP (ref 10.5–13.4)
PROTHROM AB SERPL-ACNC: 11.6 SEC — SIGNIFICANT CHANGE UP (ref 10.5–13.4)
PROTHROM AB SERPL-ACNC: 11.7 SEC — SIGNIFICANT CHANGE UP (ref 10.5–13.4)
PROTHROM AB SERPL-ACNC: 11.7 SEC — SIGNIFICANT CHANGE UP (ref 10.5–13.4)
PROTHROM AB SERPL-ACNC: 11.9 SEC — SIGNIFICANT CHANGE UP (ref 10.5–13.4)
PROTHROM AB SERPL-ACNC: 12 SEC — SIGNIFICANT CHANGE UP (ref 10.5–13.4)
PROTHROM AB SERPL-ACNC: 12.1 SEC — SIGNIFICANT CHANGE UP (ref 10.5–13.4)
PROTHROM AB SERPL-ACNC: 12.2 SEC — SIGNIFICANT CHANGE UP (ref 10.5–13.4)
PROTHROM AB SERPL-ACNC: 12.4 SEC — SIGNIFICANT CHANGE UP (ref 10.5–13.4)
PROTHROM AB SERPL-ACNC: 12.6 SEC — SIGNIFICANT CHANGE UP (ref 10.5–13.4)
PROTHROM AB SERPL-ACNC: 13.4 SEC — SIGNIFICANT CHANGE UP (ref 10.5–13.4)
PROTHROM AB SERPL-ACNC: 13.7 SEC — HIGH (ref 10.5–13.4)
PROTHROM AB SERPL-ACNC: 13.8 SEC — HIGH (ref 10.5–13.4)
PT BLD: 12.6 SEC
RBC # BLD: 2.92 M/UL — LOW (ref 4.2–5.8)
RBC # BLD: 2.93 M/UL — LOW (ref 4.2–5.8)
RBC # BLD: 3.08 M/UL — LOW (ref 4.2–5.8)
RBC # BLD: 3.13 M/UL — LOW (ref 4.2–5.8)
RBC # BLD: 3.16 M/UL — LOW (ref 4.2–5.8)
RBC # BLD: 3.16 M/UL — LOW (ref 4.2–5.8)
RBC # BLD: 3.27 M/UL — LOW (ref 4.2–5.8)
RBC # BLD: 3.28 M/UL — LOW (ref 4.2–5.8)
RBC # BLD: 3.42 M/UL — LOW (ref 4.2–5.8)
RBC # BLD: 3.42 M/UL — LOW (ref 4.2–5.8)
RBC # BLD: 3.43 M/UL — LOW (ref 4.2–5.8)
RBC # BLD: 3.49 M/UL
RBC # BLD: 3.53 M/UL — LOW (ref 4.2–5.8)
RBC # BLD: 3.56 M/UL — LOW (ref 4.2–5.8)
RBC # BLD: 3.58 M/UL
RBC # BLD: 3.58 M/UL — LOW (ref 4.2–5.8)
RBC # BLD: 3.59 M/UL — LOW (ref 4.2–5.8)
RBC # BLD: 3.6 M/UL — LOW (ref 4.2–5.8)
RBC # BLD: 3.66 M/UL — LOW (ref 4.2–5.8)
RBC # BLD: 3.79 M/UL — LOW (ref 4.2–5.8)
RBC # BLD: 3.79 M/UL — LOW (ref 4.2–5.8)
RBC # BLD: 3.8 M/UL — LOW (ref 4.2–5.8)
RBC # BLD: 3.81 M/UL — LOW (ref 4.2–5.8)
RBC # BLD: 3.81 M/UL — LOW (ref 4.2–5.8)
RBC # BLD: 3.85 M/UL — LOW (ref 4.2–5.8)
RBC # BLD: 3.85 M/UL — LOW (ref 4.2–5.8)
RBC # BLD: 3.87 M/UL — LOW (ref 4.2–5.8)
RBC # BLD: 3.97 M/UL — LOW (ref 4.2–5.8)
RBC # BLD: 3.99 M/UL
RBC # BLD: 4 M/UL — LOW (ref 4.2–5.8)
RBC # BLD: 4.05 M/UL — LOW (ref 4.2–5.8)
RBC # FLD: 12.1 % — SIGNIFICANT CHANGE UP (ref 10.3–14.5)
RBC # FLD: 12.9 %
RBC # FLD: 12.9 % — SIGNIFICANT CHANGE UP (ref 10.3–14.5)
RBC # FLD: 13 % — SIGNIFICANT CHANGE UP (ref 10.3–14.5)
RBC # FLD: 13.1 % — SIGNIFICANT CHANGE UP (ref 10.3–14.5)
RBC # FLD: 13.1 % — SIGNIFICANT CHANGE UP (ref 10.3–14.5)
RBC # FLD: 13.2 % — SIGNIFICANT CHANGE UP (ref 10.3–14.5)
RBC # FLD: 13.2 % — SIGNIFICANT CHANGE UP (ref 10.3–14.5)
RBC # FLD: 13.3 % — SIGNIFICANT CHANGE UP (ref 10.3–14.5)
RBC # FLD: 13.4 % — SIGNIFICANT CHANGE UP (ref 10.3–14.5)
RBC # FLD: 13.5 % — SIGNIFICANT CHANGE UP (ref 10.3–14.5)
RBC # FLD: 13.6 % — SIGNIFICANT CHANGE UP (ref 10.3–14.5)
RBC # FLD: 13.8 % — SIGNIFICANT CHANGE UP (ref 10.3–14.5)
RBC # FLD: 13.8 % — SIGNIFICANT CHANGE UP (ref 10.3–14.5)
RBC # FLD: 15.4 % — HIGH (ref 10.3–14.5)
RBC # FLD: 16.4 % — HIGH (ref 10.3–14.5)
RBC # FLD: 17.7 % — HIGH (ref 10.3–14.5)
RBC # FLD: 17.8 % — HIGH (ref 10.3–14.5)
RBC # FLD: 17.8 % — HIGH (ref 10.3–14.5)
RBC # FLD: 18 % — HIGH (ref 10.3–14.5)
RBC # FLD: 18 % — HIGH (ref 10.3–14.5)
RBC # FLD: 18.2 % — HIGH (ref 10.3–14.5)
RBC # FLD: 18.3 % — HIGH (ref 10.3–14.5)
RBC # FLD: 18.4 % — HIGH (ref 10.3–14.5)
RBC # FLD: 18.6 % — HIGH (ref 10.3–14.5)
RBC # FLD: 18.7 % — HIGH (ref 10.3–14.5)
RBC # FLD: 18.9 % — HIGH (ref 10.3–14.5)
RBC BLD AUTO: SIGNIFICANT CHANGE UP
RBC CASTS # UR COMP ASSIST: 4 /HPF — SIGNIFICANT CHANGE UP (ref 0–4)
RETICS # AUTO: 2.2 %
RETICS # AUTO: 2.2 %
RETICS AGGREG/RBC NFR: 77.8 K/UL
RETICS AGGREG/RBC NFR: 86.2 K/UL
RH IG SCN BLD-IMP: POSITIVE — SIGNIFICANT CHANGE UP
ROULEAUX BLD QL SMEAR: PRESENT — SIGNIFICANT CHANGE UP
RSV RNA NPH QL NAA+NON-PROBE: SIGNIFICANT CHANGE UP
S AUREUS DNA NOSE QL NAA+PROBE: DETECTED
S AUREUS DNA NOSE QL NAA+PROBE: SIGNIFICANT CHANGE UP
SAO2 % BLDV: 37.2 % — LOW (ref 67–88)
SAO2 % BLDV: 75.9 % — SIGNIFICANT CHANGE UP (ref 67–88)
SAO2 % BLDV: 81.2 % — SIGNIFICANT CHANGE UP (ref 67–88)
SAO2 % BLDV: 86.3 % — SIGNIFICANT CHANGE UP (ref 67–88)
SAO2 % BLDV: 93 % — HIGH (ref 67–88)
SARS-COV-2 RNA SPEC QL NAA+PROBE: SIGNIFICANT CHANGE UP
SMUDGE CELLS # BLD: PRESENT — SIGNIFICANT CHANGE UP
SODIUM SERPL-SCNC: 134 MMOL/L — LOW (ref 135–145)
SODIUM SERPL-SCNC: 135 MMOL/L — SIGNIFICANT CHANGE UP (ref 135–145)
SODIUM SERPL-SCNC: 136 MMOL/L — SIGNIFICANT CHANGE UP (ref 135–145)
SODIUM SERPL-SCNC: 137 MMOL/L
SODIUM SERPL-SCNC: 137 MMOL/L — SIGNIFICANT CHANGE UP (ref 135–145)
SODIUM SERPL-SCNC: 138 MMOL/L — SIGNIFICANT CHANGE UP (ref 135–145)
SODIUM SERPL-SCNC: 139 MMOL/L
SODIUM SERPL-SCNC: 139 MMOL/L — SIGNIFICANT CHANGE UP (ref 135–145)
SODIUM SERPL-SCNC: 140 MMOL/L — SIGNIFICANT CHANGE UP (ref 135–145)
SODIUM SERPL-SCNC: 141 MMOL/L — SIGNIFICANT CHANGE UP (ref 135–145)
SODIUM SERPL-SCNC: 142 MMOL/L
SP GR SPEC: 1.02 — SIGNIFICANT CHANGE UP (ref 1.01–1.02)
SPECIFIC GRAVITY URINE: 1.01
SPECIMEN SOURCE: SIGNIFICANT CHANGE UP
SURGICAL PATHOLOGY STUDY: SIGNIFICANT CHANGE UP
TIBC SERPL-MCNC: 171 UG/DL
TIBC SERPL-MCNC: 173 UG/DL
UIBC SERPL-MCNC: 127 UG/DL
UIBC SERPL-MCNC: 132 UG/DL
UROBILINOGEN FLD QL: NEGATIVE — SIGNIFICANT CHANGE UP
UROBILINOGEN URINE: NORMAL
VANCOMYCIN TROUGH SERPL-MCNC: 7.9 UG/ML — LOW (ref 10–20)
VARIANT LYMPHS # BLD: 1 % — SIGNIFICANT CHANGE UP (ref 0–6)
VIT B12 SERPL-MCNC: 518 PG/ML
WBC # BLD: 3.23 K/UL — LOW (ref 3.8–10.5)
WBC # BLD: 3.32 K/UL — LOW (ref 3.8–10.5)
WBC # BLD: 3.47 K/UL — LOW (ref 3.8–10.5)
WBC # BLD: 3.59 K/UL — LOW (ref 3.8–10.5)
WBC # BLD: 3.7 K/UL — LOW (ref 3.8–10.5)
WBC # BLD: 3.97 K/UL — SIGNIFICANT CHANGE UP (ref 3.8–10.5)
WBC # BLD: 4.1 K/UL — SIGNIFICANT CHANGE UP (ref 3.8–10.5)
WBC # BLD: 4.2 K/UL — SIGNIFICANT CHANGE UP (ref 3.8–10.5)
WBC # BLD: 4.5 K/UL — SIGNIFICANT CHANGE UP (ref 3.8–10.5)
WBC # BLD: 4.53 K/UL — SIGNIFICANT CHANGE UP (ref 3.8–10.5)
WBC # BLD: 5.25 K/UL — SIGNIFICANT CHANGE UP (ref 3.8–10.5)
WBC # BLD: 5.62 K/UL — SIGNIFICANT CHANGE UP (ref 3.8–10.5)
WBC # BLD: 5.79 K/UL — SIGNIFICANT CHANGE UP (ref 3.8–10.5)
WBC # BLD: 5.87 K/UL — SIGNIFICANT CHANGE UP (ref 3.8–10.5)
WBC # BLD: 5.99 K/UL — SIGNIFICANT CHANGE UP (ref 3.8–10.5)
WBC # BLD: 6.1 K/UL — SIGNIFICANT CHANGE UP (ref 3.8–10.5)
WBC # BLD: 6.2 K/UL — SIGNIFICANT CHANGE UP (ref 3.8–10.5)
WBC # BLD: 6.4 K/UL — SIGNIFICANT CHANGE UP (ref 3.8–10.5)
WBC # BLD: 6.48 K/UL — SIGNIFICANT CHANGE UP (ref 3.8–10.5)
WBC # BLD: 6.5 K/UL — SIGNIFICANT CHANGE UP (ref 3.8–10.5)
WBC # BLD: 6.64 K/UL — SIGNIFICANT CHANGE UP (ref 3.8–10.5)
WBC # BLD: 6.66 K/UL — SIGNIFICANT CHANGE UP (ref 3.8–10.5)
WBC # BLD: 6.7 K/UL — SIGNIFICANT CHANGE UP (ref 3.8–10.5)
WBC # BLD: 7.02 K/UL — SIGNIFICANT CHANGE UP (ref 3.8–10.5)
WBC # BLD: 7.39 K/UL — SIGNIFICANT CHANGE UP (ref 3.8–10.5)
WBC # BLD: 7.58 K/UL — SIGNIFICANT CHANGE UP (ref 3.8–10.5)
WBC # BLD: 8.1 K/UL — SIGNIFICANT CHANGE UP (ref 3.8–10.5)
WBC # BLD: 9.39 K/UL — SIGNIFICANT CHANGE UP (ref 3.8–10.5)
WBC # FLD AUTO: 3.23 K/UL — LOW (ref 3.8–10.5)
WBC # FLD AUTO: 3.32 K/UL — LOW (ref 3.8–10.5)
WBC # FLD AUTO: 3.47 K/UL — LOW (ref 3.8–10.5)
WBC # FLD AUTO: 3.59 K/UL — LOW (ref 3.8–10.5)
WBC # FLD AUTO: 3.7 K/UL — LOW (ref 3.8–10.5)
WBC # FLD AUTO: 3.97 K/UL — SIGNIFICANT CHANGE UP (ref 3.8–10.5)
WBC # FLD AUTO: 4.1 K/UL — SIGNIFICANT CHANGE UP (ref 3.8–10.5)
WBC # FLD AUTO: 4.2 K/UL — SIGNIFICANT CHANGE UP (ref 3.8–10.5)
WBC # FLD AUTO: 4.5 K/UL — SIGNIFICANT CHANGE UP (ref 3.8–10.5)
WBC # FLD AUTO: 4.53 K/UL — SIGNIFICANT CHANGE UP (ref 3.8–10.5)
WBC # FLD AUTO: 5.25 K/UL — SIGNIFICANT CHANGE UP (ref 3.8–10.5)
WBC # FLD AUTO: 5.27 K/UL
WBC # FLD AUTO: 5.62 K/UL — SIGNIFICANT CHANGE UP (ref 3.8–10.5)
WBC # FLD AUTO: 5.79 K/UL — SIGNIFICANT CHANGE UP (ref 3.8–10.5)
WBC # FLD AUTO: 5.87 K/UL — SIGNIFICANT CHANGE UP (ref 3.8–10.5)
WBC # FLD AUTO: 5.99 K/UL — SIGNIFICANT CHANGE UP (ref 3.8–10.5)
WBC # FLD AUTO: 6.1 K/UL — SIGNIFICANT CHANGE UP (ref 3.8–10.5)
WBC # FLD AUTO: 6.2 K/UL — SIGNIFICANT CHANGE UP (ref 3.8–10.5)
WBC # FLD AUTO: 6.4 K/UL — SIGNIFICANT CHANGE UP (ref 3.8–10.5)
WBC # FLD AUTO: 6.48 K/UL — SIGNIFICANT CHANGE UP (ref 3.8–10.5)
WBC # FLD AUTO: 6.5 K/UL — SIGNIFICANT CHANGE UP (ref 3.8–10.5)
WBC # FLD AUTO: 6.64 K/UL — SIGNIFICANT CHANGE UP (ref 3.8–10.5)
WBC # FLD AUTO: 6.66 K/UL — SIGNIFICANT CHANGE UP (ref 3.8–10.5)
WBC # FLD AUTO: 6.7 K/UL — SIGNIFICANT CHANGE UP (ref 3.8–10.5)
WBC # FLD AUTO: 7.02 K/UL — SIGNIFICANT CHANGE UP (ref 3.8–10.5)
WBC # FLD AUTO: 7.39 K/UL — SIGNIFICANT CHANGE UP (ref 3.8–10.5)
WBC # FLD AUTO: 7.58 K/UL — SIGNIFICANT CHANGE UP (ref 3.8–10.5)
WBC # FLD AUTO: 8.1 K/UL — SIGNIFICANT CHANGE UP (ref 3.8–10.5)
WBC # FLD AUTO: 9.39 K/UL — SIGNIFICANT CHANGE UP (ref 3.8–10.5)
WBC UR QL: 488 /HPF — HIGH (ref 0–5)

## 2022-01-01 PROCEDURE — 85025 COMPLETE CBC W/AUTO DIFF WBC: CPT

## 2022-01-01 PROCEDURE — 96375 TX/PRO/DX INJ NEW DRUG ADDON: CPT

## 2022-01-01 PROCEDURE — 87324 CLOSTRIDIUM AG IA: CPT

## 2022-01-01 PROCEDURE — 99232 SBSQ HOSP IP/OBS MODERATE 35: CPT

## 2022-01-01 PROCEDURE — 99232 SBSQ HOSP IP/OBS MODERATE 35: CPT | Mod: GC

## 2022-01-01 PROCEDURE — 86850 RBC ANTIBODY SCREEN: CPT

## 2022-01-01 PROCEDURE — 85610 PROTHROMBIN TIME: CPT

## 2022-01-01 PROCEDURE — 87077 CULTURE AEROBIC IDENTIFY: CPT

## 2022-01-01 PROCEDURE — 84295 ASSAY OF SERUM SODIUM: CPT

## 2022-01-01 PROCEDURE — 82435 ASSAY OF BLOOD CHLORIDE: CPT

## 2022-01-01 PROCEDURE — U0005: CPT

## 2022-01-01 PROCEDURE — 82330 ASSAY OF CALCIUM: CPT

## 2022-01-01 PROCEDURE — 93923 UPR/LXTR ART STDY 3+ LVLS: CPT | Mod: 26

## 2022-01-01 PROCEDURE — 36415 COLL VENOUS BLD VENIPUNCTURE: CPT

## 2022-01-01 PROCEDURE — A9585: CPT

## 2022-01-01 PROCEDURE — 97162 PT EVAL MOD COMPLEX 30 MIN: CPT

## 2022-01-01 PROCEDURE — 83735 ASSAY OF MAGNESIUM: CPT

## 2022-01-01 PROCEDURE — 73630 X-RAY EXAM OF FOOT: CPT | Mod: 26,RT

## 2022-01-01 PROCEDURE — 81001 URINALYSIS AUTO W/SCOPE: CPT

## 2022-01-01 PROCEDURE — 83605 ASSAY OF LACTIC ACID: CPT

## 2022-01-01 PROCEDURE — U0003: CPT

## 2022-01-01 PROCEDURE — 87205 SMEAR GRAM STAIN: CPT

## 2022-01-01 PROCEDURE — 99222 1ST HOSP IP/OBS MODERATE 55: CPT

## 2022-01-01 PROCEDURE — 82947 ASSAY GLUCOSE BLOOD QUANT: CPT

## 2022-01-01 PROCEDURE — 85018 HEMOGLOBIN: CPT

## 2022-01-01 PROCEDURE — 88305 TISSUE EXAM BY PATHOLOGIST: CPT

## 2022-01-01 PROCEDURE — 99233 SBSQ HOSP IP/OBS HIGH 50: CPT

## 2022-01-01 PROCEDURE — 88311 DECALCIFY TISSUE: CPT | Mod: 26

## 2022-01-01 PROCEDURE — 99213 OFFICE O/P EST LOW 20 MIN: CPT

## 2022-01-01 PROCEDURE — 73718 MRI LOWER EXTREMITY W/O DYE: CPT | Mod: 26,RT

## 2022-01-01 PROCEDURE — 80048 BASIC METABOLIC PNL TOTAL CA: CPT

## 2022-01-01 PROCEDURE — 97161 PT EVAL LOW COMPLEX 20 MIN: CPT

## 2022-01-01 PROCEDURE — 88305 TISSUE EXAM BY PATHOLOGIST: CPT | Mod: 26

## 2022-01-01 PROCEDURE — 86901 BLOOD TYPING SEROLOGIC RH(D): CPT

## 2022-01-01 PROCEDURE — 80202 ASSAY OF VANCOMYCIN: CPT

## 2022-01-01 PROCEDURE — 93306 TTE W/DOPPLER COMPLETE: CPT | Mod: 26

## 2022-01-01 PROCEDURE — 93010 ELECTROCARDIOGRAM REPORT: CPT

## 2022-01-01 PROCEDURE — 88311 DECALCIFY TISSUE: CPT

## 2022-01-01 PROCEDURE — 85014 HEMATOCRIT: CPT

## 2022-01-01 PROCEDURE — 85652 RBC SED RATE AUTOMATED: CPT

## 2022-01-01 PROCEDURE — 86900 BLOOD TYPING SEROLOGIC ABO: CPT

## 2022-01-01 PROCEDURE — 73720 MRI LWR EXTREMITY W/O&W/DYE: CPT

## 2022-01-01 PROCEDURE — 84132 ASSAY OF SERUM POTASSIUM: CPT

## 2022-01-01 PROCEDURE — 73718 MRI LOWER EXTREMITY W/O DYE: CPT

## 2022-01-01 PROCEDURE — 99285 EMERGENCY DEPT VISIT HI MDM: CPT

## 2022-01-01 PROCEDURE — 82962 GLUCOSE BLOOD TEST: CPT

## 2022-01-01 PROCEDURE — 99223 1ST HOSP IP/OBS HIGH 75: CPT

## 2022-01-01 PROCEDURE — 71045 X-RAY EXAM CHEST 1 VIEW: CPT

## 2022-01-01 PROCEDURE — 99233 SBSQ HOSP IP/OBS HIGH 50: CPT | Mod: GC

## 2022-01-01 PROCEDURE — 82565 ASSAY OF CREATININE: CPT

## 2022-01-01 PROCEDURE — 97530 THERAPEUTIC ACTIVITIES: CPT

## 2022-01-01 PROCEDURE — 84100 ASSAY OF PHOSPHORUS: CPT

## 2022-01-01 PROCEDURE — 87075 CULTR BACTERIA EXCEPT BLOOD: CPT

## 2022-01-01 PROCEDURE — 83036 HEMOGLOBIN GLYCOSYLATED A1C: CPT

## 2022-01-01 PROCEDURE — 96374 THER/PROPH/DIAG INJ IV PUSH: CPT

## 2022-01-01 PROCEDURE — 99261: CPT

## 2022-01-01 PROCEDURE — 80053 COMPREHEN METABOLIC PANEL: CPT

## 2022-01-01 PROCEDURE — 93970 EXTREMITY STUDY: CPT

## 2022-01-01 PROCEDURE — 12345: CPT | Mod: NC,GC

## 2022-01-01 PROCEDURE — 99239 HOSP IP/OBS DSCHRG MGMT >30: CPT | Mod: GC

## 2022-01-01 PROCEDURE — 87641 MR-STAPH DNA AMP PROBE: CPT

## 2022-01-01 PROCEDURE — 88304 TISSUE EXAM BY PATHOLOGIST: CPT

## 2022-01-01 PROCEDURE — 99223 1ST HOSP IP/OBS HIGH 75: CPT | Mod: GC

## 2022-01-01 PROCEDURE — 93923 UPR/LXTR ART STDY 3+ LVLS: CPT

## 2022-01-01 PROCEDURE — 87449 NOS EACH ORGANISM AG IA: CPT

## 2022-01-01 PROCEDURE — 71045 X-RAY EXAM CHEST 1 VIEW: CPT | Mod: 26

## 2022-01-01 PROCEDURE — 85730 THROMBOPLASTIN TIME PARTIAL: CPT

## 2022-01-01 PROCEDURE — 85027 COMPLETE CBC AUTOMATED: CPT

## 2022-01-01 PROCEDURE — 88304 TISSUE EXAM BY PATHOLOGIST: CPT | Mod: 26

## 2022-01-01 PROCEDURE — 87640 STAPH A DNA AMP PROBE: CPT

## 2022-01-01 PROCEDURE — 87040 BLOOD CULTURE FOR BACTERIA: CPT

## 2022-01-01 PROCEDURE — C8929: CPT

## 2022-01-01 PROCEDURE — 82803 BLOOD GASES ANY COMBINATION: CPT

## 2022-01-01 PROCEDURE — 87637 SARSCOV2&INF A&B&RSV AMP PRB: CPT

## 2022-01-01 PROCEDURE — 73720 MRI LWR EXTREMITY W/O&W/DYE: CPT | Mod: 26,RT

## 2022-01-01 PROCEDURE — 87070 CULTURE OTHR SPECIMN AEROBIC: CPT

## 2022-01-01 PROCEDURE — 87635 SARS-COV-2 COVID-19 AMP PRB: CPT

## 2022-01-01 PROCEDURE — 99214 OFFICE O/P EST MOD 30 MIN: CPT

## 2022-01-01 PROCEDURE — 11042 DBRDMT SUBQ TIS 1ST 20SQCM/<: CPT

## 2022-01-01 PROCEDURE — 73630 X-RAY EXAM OF FOOT: CPT

## 2022-01-01 PROCEDURE — 86140 C-REACTIVE PROTEIN: CPT

## 2022-01-01 PROCEDURE — 80169 DRUG ASSAY EVEROLIMUS: CPT

## 2022-01-01 PROCEDURE — 87186 SC STD MICRODIL/AGAR DIL: CPT

## 2022-01-01 PROCEDURE — 97164 PT RE-EVAL EST PLAN CARE: CPT

## 2022-01-01 PROCEDURE — 99285 EMERGENCY DEPT VISIT HI MDM: CPT | Mod: 25

## 2022-01-01 PROCEDURE — 93970 EXTREMITY STUDY: CPT | Mod: 26

## 2022-01-01 PROCEDURE — 11042 DBRDMT SUBQ TIS 1ST 20SQCM/<: CPT | Mod: 79

## 2022-01-01 DEVICE — GRAFT FISH SKIN OMEGA3 MARIGEN MICRO 7SQCM: Type: IMPLANTABLE DEVICE | Site: RIGHT | Status: FUNCTIONAL

## 2022-01-01 DEVICE — GRAFT FISH SKIN OMEGA3 WOUND 7X10CM: Type: IMPLANTABLE DEVICE | Site: RIGHT | Status: FUNCTIONAL

## 2022-01-01 RX ORDER — PANTOPRAZOLE SODIUM 20 MG/1
40 TABLET, DELAYED RELEASE ORAL
Refills: 0 | Status: DISCONTINUED | OUTPATIENT
Start: 2022-01-01 | End: 2022-01-01

## 2022-01-01 RX ORDER — INSULIN LISPRO 100/ML
VIAL (ML) SUBCUTANEOUS
Refills: 0 | Status: DISCONTINUED | OUTPATIENT
Start: 2022-01-01 | End: 2022-01-01

## 2022-01-01 RX ORDER — INSULIN GLARGINE 100 [IU]/ML
28 INJECTION, SOLUTION SUBCUTANEOUS AT BEDTIME
Refills: 0 | Status: DISCONTINUED | OUTPATIENT
Start: 2022-01-01 | End: 2022-01-02

## 2022-01-01 RX ORDER — CEFEPIME 1 G/1
2000 INJECTION, POWDER, FOR SOLUTION INTRAMUSCULAR; INTRAVENOUS ONCE
Refills: 0 | Status: COMPLETED | OUTPATIENT
Start: 2022-01-01 | End: 2022-01-01

## 2022-01-01 RX ORDER — DEXTROSE 50 % IN WATER 50 %
12.5 SYRINGE (ML) INTRAVENOUS ONCE
Refills: 0 | Status: DISCONTINUED | OUTPATIENT
Start: 2022-01-01 | End: 2022-01-01

## 2022-01-01 RX ORDER — FOLIC ACID 0.8 MG
1 TABLET ORAL DAILY
Refills: 0 | Status: DISCONTINUED | OUTPATIENT
Start: 2022-01-01 | End: 2022-01-01

## 2022-01-01 RX ORDER — INSULIN GLARGINE 100 [IU]/ML
10 INJECTION, SOLUTION SUBCUTANEOUS AT BEDTIME
Refills: 0 | Status: DISCONTINUED | OUTPATIENT
Start: 2022-01-01 | End: 2022-01-01

## 2022-01-01 RX ORDER — HYDROMORPHONE HYDROCHLORIDE 2 MG/ML
0.5 INJECTION INTRAMUSCULAR; INTRAVENOUS; SUBCUTANEOUS
Refills: 0 | Status: DISCONTINUED | OUTPATIENT
Start: 2022-01-01 | End: 2022-01-01

## 2022-01-01 RX ORDER — OMEGA-3 ACID ETHYL ESTERS 1 G
2 CAPSULE ORAL EVERY 12 HOURS
Refills: 0 | Status: DISCONTINUED | OUTPATIENT
Start: 2022-01-01 | End: 2022-01-01

## 2022-01-01 RX ORDER — DEXTROSE 50 % IN WATER 50 %
15 SYRINGE (ML) INTRAVENOUS ONCE
Refills: 0 | Status: DISCONTINUED | OUTPATIENT
Start: 2022-01-01 | End: 2022-01-01

## 2022-01-01 RX ORDER — MYCOPHENOLATE MOFETIL 250 MG/1
250 CAPSULE ORAL TWICE DAILY
Qty: 60 | Refills: 11 | Status: DISCONTINUED | COMMUNITY
Start: 2022-05-09 | End: 2022-01-01

## 2022-01-01 RX ORDER — PIPERACILLIN AND TAZOBACTAM 4; .5 G/20ML; G/20ML
3.38 INJECTION, POWDER, LYOPHILIZED, FOR SOLUTION INTRAVENOUS ONCE
Refills: 0 | Status: COMPLETED | OUTPATIENT
Start: 2022-01-01 | End: 2022-01-01

## 2022-01-01 RX ORDER — PIPERACILLIN AND TAZOBACTAM 4; .5 G/20ML; G/20ML
3.38 INJECTION, POWDER, LYOPHILIZED, FOR SOLUTION INTRAVENOUS EVERY 8 HOURS
Refills: 0 | Status: DISCONTINUED | OUTPATIENT
Start: 2022-01-01 | End: 2022-01-01

## 2022-01-01 RX ORDER — VALGANCICLOVIR 450 MG/1
450 TABLET, FILM COATED ORAL DAILY
Refills: 0 | Status: DISCONTINUED | OUTPATIENT
Start: 2022-01-01 | End: 2022-01-01

## 2022-01-01 RX ORDER — CHLORHEXIDINE GLUCONATE 213 G/1000ML
1 SOLUTION TOPICAL DAILY
Refills: 0 | Status: DISCONTINUED | OUTPATIENT
Start: 2022-01-01 | End: 2022-01-01

## 2022-01-01 RX ORDER — VALGANCICLOVIR 450 MG/1
1 TABLET, FILM COATED ORAL
Qty: 40 | Refills: 0
Start: 2022-01-01 | End: 2023-01-01

## 2022-01-01 RX ORDER — DEXTROSE 50 % IN WATER 50 %
25 SYRINGE (ML) INTRAVENOUS ONCE
Refills: 0 | Status: DISCONTINUED | OUTPATIENT
Start: 2022-01-01 | End: 2022-01-01

## 2022-01-01 RX ORDER — TAMSULOSIN HYDROCHLORIDE 0.4 MG/1
0.4 CAPSULE ORAL AT BEDTIME
Refills: 0 | Status: DISCONTINUED | OUTPATIENT
Start: 2022-01-01 | End: 2022-01-01

## 2022-01-01 RX ORDER — INSULIN GLARGINE 100 [IU]/ML
14 INJECTION, SOLUTION SUBCUTANEOUS AT BEDTIME
Refills: 0 | Status: DISCONTINUED | OUTPATIENT
Start: 2022-01-01 | End: 2022-01-01

## 2022-01-01 RX ORDER — SODIUM HYPOCHLORITE 0.125 %
1 SOLUTION, NON-ORAL MISCELLANEOUS DAILY
Refills: 0 | Status: DISCONTINUED | OUTPATIENT
Start: 2022-01-01 | End: 2022-01-01

## 2022-01-01 RX ORDER — COLLAGENASE SANTYL 250 [ARB'U]/G
250 OINTMENT TOPICAL DAILY
Qty: 2 | Refills: 3 | Status: ACTIVE | COMMUNITY
Start: 2022-01-01 | End: 1900-01-01

## 2022-01-01 RX ORDER — VANCOMYCIN HCL 1 G
1000 VIAL (EA) INTRAVENOUS ONCE
Refills: 0 | Status: COMPLETED | OUTPATIENT
Start: 2022-01-01 | End: 2022-01-01

## 2022-01-01 RX ORDER — SODIUM CHLORIDE 9 MG/ML
1000 INJECTION, SOLUTION INTRAVENOUS
Refills: 0 | Status: DISCONTINUED | OUTPATIENT
Start: 2022-01-01 | End: 2022-01-01

## 2022-01-01 RX ORDER — ONDANSETRON 8 MG/1
8 TABLET, FILM COATED ORAL ONCE
Refills: 0 | Status: DISCONTINUED | OUTPATIENT
Start: 2022-01-01 | End: 2022-01-01

## 2022-01-01 RX ORDER — FOLIC ACID 0.8 MG
1 TABLET ORAL EVERY 24 HOURS
Refills: 0 | Status: DISCONTINUED | OUTPATIENT
Start: 2022-01-01 | End: 2022-01-01

## 2022-01-01 RX ORDER — LIDOCAINE HCL 20 MG/ML
10 VIAL (ML) INJECTION ONCE
Refills: 0 | Status: COMPLETED | OUTPATIENT
Start: 2022-01-01 | End: 2022-01-01

## 2022-01-01 RX ORDER — ACETAMINOPHEN 500 MG
975 TABLET ORAL EVERY 6 HOURS
Refills: 0 | Status: DISCONTINUED | OUTPATIENT
Start: 2022-01-01 | End: 2022-01-01

## 2022-01-01 RX ORDER — MYCOPHENOLATE MOFETIL 250 MG/1
250 CAPSULE ORAL
Refills: 0 | Status: DISCONTINUED | OUTPATIENT
Start: 2022-01-01 | End: 2022-01-01

## 2022-01-01 RX ORDER — GLUCAGON INJECTION, SOLUTION 0.5 MG/.1ML
1 INJECTION, SOLUTION SUBCUTANEOUS ONCE
Refills: 0 | Status: DISCONTINUED | OUTPATIENT
Start: 2022-01-01 | End: 2022-01-01

## 2022-01-01 RX ORDER — ERTAPENEM SODIUM 1 G/1
1000 INJECTION, POWDER, LYOPHILIZED, FOR SOLUTION INTRAMUSCULAR; INTRAVENOUS EVERY 24 HOURS
Refills: 0 | Status: DISCONTINUED | OUTPATIENT
Start: 2022-01-01 | End: 2022-01-01

## 2022-01-01 RX ORDER — INSULIN LISPRO 100/ML
12 VIAL (ML) SUBCUTANEOUS
Refills: 0 | Status: DISCONTINUED | OUTPATIENT
Start: 2022-01-01 | End: 2022-01-01

## 2022-01-01 RX ORDER — CEFEPIME 1 G/1
INJECTION, POWDER, FOR SOLUTION INTRAMUSCULAR; INTRAVENOUS
Refills: 0 | Status: DISCONTINUED | OUTPATIENT
Start: 2022-01-01 | End: 2022-01-01

## 2022-01-01 RX ORDER — MYCOPHENOLATE MOFETIL 250 MG/1
1 CAPSULE ORAL
Qty: 60 | Refills: 0
Start: 2022-01-01 | End: 2023-01-01

## 2022-01-01 RX ORDER — MYCOPHENOLATE MOFETIL 250 MG/1
2 CAPSULE ORAL
Qty: 0 | Refills: 1 | DISCHARGE
Start: 2022-01-01

## 2022-01-01 RX ORDER — LEVOFLOXACIN 500 MG/1
500 TABLET, FILM COATED ORAL DAILY
Qty: 10 | Refills: 0 | Status: ACTIVE | COMMUNITY
Start: 2022-01-01 | End: 1900-01-01

## 2022-01-01 RX ORDER — PIPERACILLIN AND TAZOBACTAM 4; .5 G/20ML; G/20ML
3.38 INJECTION, POWDER, LYOPHILIZED, FOR SOLUTION INTRAVENOUS ONCE
Refills: 0 | Status: DISCONTINUED | OUTPATIENT
Start: 2022-01-01 | End: 2022-01-01

## 2022-01-01 RX ORDER — MYCOPHENOLATE MOFETIL 250 MG/1
500 CAPSULE ORAL
Qty: 0 | Refills: 0 | DISCHARGE
Start: 2022-01-01

## 2022-01-01 RX ORDER — MYCOPHENOLATE MOFETIL 250 MG/1
500 CAPSULE ORAL
Refills: 0 | Status: DISCONTINUED | OUTPATIENT
Start: 2022-01-01 | End: 2022-01-01

## 2022-01-01 RX ORDER — SODIUM,POTASSIUM PHOSPHATES 278-250MG
2 POWDER IN PACKET (EA) ORAL ONCE
Refills: 0 | Status: COMPLETED | OUTPATIENT
Start: 2022-01-01 | End: 2022-01-01

## 2022-01-01 RX ORDER — LANOLIN ALCOHOL/MO/W.PET/CERES
3 CREAM (GRAM) TOPICAL AT BEDTIME
Refills: 0 | Status: DISCONTINUED | OUTPATIENT
Start: 2022-01-01 | End: 2022-01-01

## 2022-01-01 RX ORDER — EVEROLIMUS 10 MG/1
1 TABLET ORAL
Qty: 0 | Refills: 0 | DISCHARGE

## 2022-01-01 RX ORDER — SENNA PLUS 8.6 MG/1
2 TABLET ORAL AT BEDTIME
Refills: 0 | Status: DISCONTINUED | OUTPATIENT
Start: 2022-01-01 | End: 2022-01-01

## 2022-01-01 RX ORDER — INSULIN LISPRO 100/ML
VIAL (ML) SUBCUTANEOUS AT BEDTIME
Refills: 0 | Status: DISCONTINUED | OUTPATIENT
Start: 2022-01-01 | End: 2022-01-01

## 2022-01-01 RX ORDER — AMPICILLIN SODIUM AND SULBACTAM SODIUM 250; 125 MG/ML; MG/ML
INJECTION, POWDER, FOR SUSPENSION INTRAMUSCULAR; INTRAVENOUS
Refills: 0 | Status: DISCONTINUED | OUTPATIENT
Start: 2022-01-01 | End: 2022-01-01

## 2022-01-01 RX ORDER — CARBAMIDE PEROXIDE 81.86 MG/ML
1 SOLUTION/ DROPS AURICULAR (OTIC)
Refills: 0 | Status: DISCONTINUED | OUTPATIENT
Start: 2022-01-01 | End: 2022-01-01

## 2022-01-01 RX ORDER — VANCOMYCIN HCL 1 G
1250 VIAL (EA) INTRAVENOUS EVERY 24 HOURS
Refills: 0 | Status: DISCONTINUED | OUTPATIENT
Start: 2022-01-01 | End: 2022-01-01

## 2022-01-01 RX ORDER — ACETAMINOPHEN 500 MG
650 TABLET ORAL EVERY 6 HOURS
Refills: 0 | Status: DISCONTINUED | OUTPATIENT
Start: 2022-01-01 | End: 2022-01-01

## 2022-01-01 RX ORDER — INSULIN GLARGINE 100 [IU]/ML
5 INJECTION, SOLUTION SUBCUTANEOUS
Qty: 0 | Refills: 0 | DISCHARGE
Start: 2022-01-01

## 2022-01-01 RX ORDER — CEFEPIME 1 G/1
2000 INJECTION, POWDER, FOR SOLUTION INTRAMUSCULAR; INTRAVENOUS EVERY 12 HOURS
Refills: 0 | Status: DISCONTINUED | OUTPATIENT
Start: 2022-01-01 | End: 2022-01-01

## 2022-01-01 RX ORDER — ACETAMINOPHEN 500 MG
1000 TABLET ORAL ONCE
Refills: 0 | Status: COMPLETED | OUTPATIENT
Start: 2022-01-01 | End: 2022-01-01

## 2022-01-01 RX ORDER — INSULIN GLARGINE 100 [IU]/ML
16 INJECTION, SOLUTION SUBCUTANEOUS
Qty: 0 | Refills: 0 | DISCHARGE
Start: 2022-01-01

## 2022-01-01 RX ORDER — INSULIN LISPRO 100/ML
VIAL (ML) SUBCUTANEOUS EVERY 6 HOURS
Refills: 0 | Status: DISCONTINUED | OUTPATIENT
Start: 2022-01-01 | End: 2022-01-01

## 2022-01-01 RX ORDER — VANCOMYCIN HCL 1 G
1500 VIAL (EA) INTRAVENOUS EVERY 12 HOURS
Refills: 0 | Status: DISCONTINUED | OUTPATIENT
Start: 2022-01-01 | End: 2022-01-01

## 2022-01-01 RX ORDER — INSULIN LISPRO 100/ML
20 VIAL (ML) SUBCUTANEOUS
Refills: 0 | Status: DISCONTINUED | OUTPATIENT
Start: 2022-01-01 | End: 2022-01-02

## 2022-01-01 RX ORDER — VANCOMYCIN HCL 1 G
1500 VIAL (EA) INTRAVENOUS EVERY 24 HOURS
Refills: 0 | Status: DISCONTINUED | OUTPATIENT
Start: 2022-01-01 | End: 2022-01-01

## 2022-01-01 RX ORDER — SERTRALINE 25 MG/1
100 TABLET, FILM COATED ORAL DAILY
Refills: 0 | Status: DISCONTINUED | OUTPATIENT
Start: 2022-01-01 | End: 2022-01-01

## 2022-01-01 RX ORDER — INSULIN GLARGINE 100 [IU]/ML
16 INJECTION, SOLUTION SUBCUTANEOUS AT BEDTIME
Refills: 0 | Status: DISCONTINUED | OUTPATIENT
Start: 2022-01-01 | End: 2022-01-01

## 2022-01-01 RX ORDER — POLYETHYLENE GLYCOL 3350 17 G/17G
17 POWDER, FOR SOLUTION ORAL EVERY 24 HOURS
Refills: 0 | Status: DISCONTINUED | OUTPATIENT
Start: 2022-01-01 | End: 2022-01-01

## 2022-01-01 RX ORDER — HEPARIN SODIUM 5000 [USP'U]/ML
5000 INJECTION INTRAVENOUS; SUBCUTANEOUS EVERY 12 HOURS
Refills: 0 | Status: DISCONTINUED | OUTPATIENT
Start: 2022-01-01 | End: 2022-01-01

## 2022-01-01 RX ORDER — VALGANCICLOVIR 450 MG/1
1 TABLET, FILM COATED ORAL
Qty: 0 | Refills: 0 | DISCHARGE
Start: 2022-01-01

## 2022-01-01 RX ORDER — VALGANCICLOVIR 450 MG/1
450 TABLET, FILM COATED ORAL EVERY 12 HOURS
Refills: 0 | Status: DISCONTINUED | OUTPATIENT
Start: 2022-01-01 | End: 2022-01-01

## 2022-01-01 RX ORDER — MYCOPHENOLATE MOFETIL 250 MG/1
250 CAPSULE ORAL ONCE
Refills: 0 | Status: DISCONTINUED | OUTPATIENT
Start: 2022-01-01 | End: 2022-01-01

## 2022-01-01 RX ORDER — DEXTROSE 50 % IN WATER 50 %
15 SYRINGE (ML) INTRAVENOUS ONCE
Refills: 0 | Status: COMPLETED | OUTPATIENT
Start: 2022-01-01 | End: 2022-01-01

## 2022-01-01 RX ORDER — ONDANSETRON 8 MG/1
4 TABLET, FILM COATED ORAL ONCE
Refills: 0 | Status: DISCONTINUED | OUTPATIENT
Start: 2022-01-01 | End: 2022-01-01

## 2022-01-01 RX ORDER — SERTRALINE 25 MG/1
1 TABLET, FILM COATED ORAL
Qty: 0 | Refills: 0 | DISCHARGE
Start: 2022-01-01

## 2022-01-01 RX ORDER — EVEROLIMUS 10 MG/1
3 TABLET ORAL
Refills: 0 | Status: DISCONTINUED | OUTPATIENT
Start: 2022-01-01 | End: 2022-01-01

## 2022-01-01 RX ORDER — INSULIN LISPRO 100/ML
10 VIAL (ML) SUBCUTANEOUS
Refills: 0 | Status: DISCONTINUED | OUTPATIENT
Start: 2022-01-01 | End: 2022-01-01

## 2022-01-01 RX ORDER — CEFUROXIME AXETIL 250 MG
1 TABLET ORAL
Qty: 28 | Refills: 0
Start: 2022-01-01 | End: 2023-01-01

## 2022-01-01 RX ORDER — MORPHINE SULFATE 50 MG/1
2 CAPSULE, EXTENDED RELEASE ORAL EVERY 4 HOURS
Refills: 0 | Status: DISCONTINUED | OUTPATIENT
Start: 2022-01-01 | End: 2022-01-01

## 2022-01-01 RX ORDER — VALGANCICLOVIR 450 MG/1
450 TABLET, FILM COATED ORAL EVERY 24 HOURS
Refills: 0 | Status: DISCONTINUED | OUTPATIENT
Start: 2022-01-01 | End: 2022-01-01

## 2022-01-01 RX ORDER — AMPICILLIN SODIUM AND SULBACTAM SODIUM 250; 125 MG/ML; MG/ML
3 INJECTION, POWDER, FOR SUSPENSION INTRAMUSCULAR; INTRAVENOUS EVERY 6 HOURS
Refills: 0 | Status: DISCONTINUED | OUTPATIENT
Start: 2022-01-01 | End: 2022-01-01

## 2022-01-01 RX ORDER — SERTRALINE 25 MG/1
100 TABLET, FILM COATED ORAL EVERY 24 HOURS
Refills: 0 | Status: DISCONTINUED | OUTPATIENT
Start: 2022-01-01 | End: 2022-01-01

## 2022-01-01 RX ORDER — VANCOMYCIN HCL 1 G
1550 VIAL (EA) INTRAVENOUS ONCE
Refills: 0 | Status: DISCONTINUED | OUTPATIENT
Start: 2022-01-01 | End: 2022-01-01

## 2022-01-01 RX ORDER — MYCOPHENOLATE MOFETIL 500 MG/1
500 TABLET ORAL TWICE DAILY
Qty: 180 | Refills: 1 | Status: DISCONTINUED | COMMUNITY
Start: 2022-01-01 | End: 2022-01-01

## 2022-01-01 RX ORDER — AMOXICILLIN AND CLAVULANATE POTASSIUM 875; 125 MG/1; MG/1
875-125 TABLET, COATED ORAL
Qty: 20 | Refills: 0 | Status: ACTIVE | COMMUNITY
Start: 2022-01-01 | End: 1900-01-01

## 2022-01-01 RX ORDER — POLYETHYLENE GLYCOL 3350 17 G/17G
17 POWDER, FOR SOLUTION ORAL DAILY
Refills: 0 | Status: DISCONTINUED | OUTPATIENT
Start: 2022-01-01 | End: 2022-01-01

## 2022-01-01 RX ORDER — HEPARIN SODIUM 5000 [USP'U]/ML
5000 INJECTION INTRAVENOUS; SUBCUTANEOUS EVERY 8 HOURS
Refills: 0 | Status: DISCONTINUED | OUTPATIENT
Start: 2022-01-01 | End: 2022-01-01

## 2022-01-01 RX ORDER — INSULIN GLARGINE 100 [IU]/ML
5 INJECTION, SOLUTION SUBCUTANEOUS AT BEDTIME
Refills: 0 | Status: DISCONTINUED | OUTPATIENT
Start: 2022-01-01 | End: 2022-01-01

## 2022-01-01 RX ORDER — SERTRALINE HYDROCHLORIDE 100 MG/1
100 TABLET, FILM COATED ORAL
Qty: 30 | Refills: 5 | Status: ACTIVE | COMMUNITY
Start: 2021-01-04 | End: 1900-01-01

## 2022-01-01 RX ORDER — AMPICILLIN SODIUM AND SULBACTAM SODIUM 250; 125 MG/ML; MG/ML
3 INJECTION, POWDER, FOR SUSPENSION INTRAMUSCULAR; INTRAVENOUS ONCE
Refills: 0 | Status: COMPLETED | OUTPATIENT
Start: 2022-01-01 | End: 2022-01-01

## 2022-01-01 RX ORDER — VANCOMYCIN HCL 1 G
1000 VIAL (EA) INTRAVENOUS ONCE
Refills: 0 | Status: DISCONTINUED | OUTPATIENT
Start: 2022-01-01 | End: 2022-01-01

## 2022-01-01 RX ORDER — SODIUM CHLORIDE 9 MG/ML
1000 INJECTION INTRAMUSCULAR; INTRAVENOUS; SUBCUTANEOUS ONCE
Refills: 0 | Status: COMPLETED | OUTPATIENT
Start: 2022-01-01 | End: 2022-01-01

## 2022-01-01 RX ORDER — SODIUM CHLORIDE 9 MG/ML
250 INJECTION INTRAMUSCULAR; INTRAVENOUS; SUBCUTANEOUS ONCE
Refills: 0 | Status: COMPLETED | OUTPATIENT
Start: 2022-01-01 | End: 2022-01-01

## 2022-01-01 RX ORDER — ONDANSETRON 8 MG/1
4 TABLET, FILM COATED ORAL EVERY 8 HOURS
Refills: 0 | Status: DISCONTINUED | OUTPATIENT
Start: 2022-01-01 | End: 2022-01-01

## 2022-01-01 RX ORDER — FOLIC ACID 1 MG/1
1 TABLET ORAL
Qty: 30 | Refills: 5 | Status: ACTIVE | COMMUNITY
Start: 2021-03-26 | End: 1900-01-01

## 2022-01-01 RX ORDER — INSULIN LISPRO 100/ML
8 VIAL (ML) SUBCUTANEOUS
Refills: 0 | Status: DISCONTINUED | OUTPATIENT
Start: 2022-01-01 | End: 2022-01-01

## 2022-01-01 RX ORDER — ATORVASTATIN CALCIUM 80 MG/1
20 TABLET, FILM COATED ORAL AT BEDTIME
Refills: 0 | Status: DISCONTINUED | OUTPATIENT
Start: 2022-01-01 | End: 2022-01-01

## 2022-01-01 RX ORDER — PREDNISONE 10 MG/1
10 TABLET ORAL DAILY
Qty: 60 | Refills: 2 | Status: ACTIVE | COMMUNITY
Start: 2022-01-01 | End: 1900-01-01

## 2022-01-01 RX ORDER — EVEROLIMUS 10 MG/1
4 TABLET ORAL
Refills: 0 | Status: DISCONTINUED | OUTPATIENT
Start: 2022-01-01 | End: 2022-01-01

## 2022-01-01 RX ORDER — INFLUENZA VIRUS VACCINE 15; 15; 15; 15 UG/.5ML; UG/.5ML; UG/.5ML; UG/.5ML
0.7 SUSPENSION INTRAMUSCULAR ONCE
Refills: 0 | Status: DISCONTINUED | OUTPATIENT
Start: 2022-01-01 | End: 2022-01-01

## 2022-01-01 RX ORDER — TAMSULOSIN HYDROCHLORIDE 0.4 MG/1
0.4 CAPSULE ORAL
Qty: 30 | Refills: 1 | Status: ACTIVE | COMMUNITY
Start: 1900-01-01 | End: 1900-01-01

## 2022-01-01 RX ORDER — SENNA PLUS 8.6 MG/1
2 TABLET ORAL
Qty: 0 | Refills: 0 | DISCHARGE
Start: 2022-01-01

## 2022-01-01 RX ORDER — INSULIN GLARGINE 100 [IU]/ML
14 INJECTION, SOLUTION SUBCUTANEOUS
Qty: 0 | Refills: 0 | DISCHARGE
Start: 2022-01-01

## 2022-01-01 RX ORDER — MYCOPHENOLATE MOFETIL 500 MG/1
500 TABLET ORAL TWICE DAILY
Qty: 60 | Refills: 11 | Status: DISCONTINUED | COMMUNITY
Start: 2020-07-21 | End: 2022-01-01

## 2022-01-01 RX ADMIN — SERTRALINE 100 MILLIGRAM(S): 25 TABLET, FILM COATED ORAL at 13:05

## 2022-01-01 RX ADMIN — Medication 975 MILLIGRAM(S): at 22:24

## 2022-01-01 RX ADMIN — Medication 2 GRAM(S): at 22:29

## 2022-01-01 RX ADMIN — VALGANCICLOVIR 450 MILLIGRAM(S): 450 TABLET, FILM COATED ORAL at 08:41

## 2022-01-01 RX ADMIN — Medication 650 MILLIGRAM(S): at 05:45

## 2022-01-01 RX ADMIN — INSULIN GLARGINE 14 UNIT(S): 100 INJECTION, SOLUTION SUBCUTANEOUS at 21:27

## 2022-01-01 RX ADMIN — PANTOPRAZOLE SODIUM 40 MILLIGRAM(S): 20 TABLET, DELAYED RELEASE ORAL at 18:07

## 2022-01-01 RX ADMIN — PIPERACILLIN AND TAZOBACTAM 200 GRAM(S): 4; .5 INJECTION, POWDER, LYOPHILIZED, FOR SOLUTION INTRAVENOUS at 16:16

## 2022-01-01 RX ADMIN — Medication 8 UNIT(S): at 16:09

## 2022-01-01 RX ADMIN — MYCOPHENOLATE MOFETIL 500 MILLIGRAM(S): 250 CAPSULE ORAL at 17:05

## 2022-01-01 RX ADMIN — SERTRALINE 100 MILLIGRAM(S): 25 TABLET, FILM COATED ORAL at 08:48

## 2022-01-01 RX ADMIN — Medication 1 APPLICATION(S): at 12:35

## 2022-01-01 RX ADMIN — Medication 10 MILLIGRAM(S): at 08:33

## 2022-01-01 RX ADMIN — Medication 2 GRAM(S): at 19:44

## 2022-01-01 RX ADMIN — MYCOPHENOLATE MOFETIL 250 MILLIGRAM(S): 250 CAPSULE ORAL at 19:54

## 2022-01-01 RX ADMIN — PIPERACILLIN AND TAZOBACTAM 25 GRAM(S): 4; .5 INJECTION, POWDER, LYOPHILIZED, FOR SOLUTION INTRAVENOUS at 21:20

## 2022-01-01 RX ADMIN — SERTRALINE 100 MILLIGRAM(S): 25 TABLET, FILM COATED ORAL at 11:18

## 2022-01-01 RX ADMIN — Medication 2: at 18:27

## 2022-01-01 RX ADMIN — Medication 2: at 08:13

## 2022-01-01 RX ADMIN — HEPARIN SODIUM 5000 UNIT(S): 5000 INJECTION INTRAVENOUS; SUBCUTANEOUS at 05:13

## 2022-01-01 RX ADMIN — Medication 250 MILLIGRAM(S): at 06:59

## 2022-01-01 RX ADMIN — Medication 1: at 08:01

## 2022-01-01 RX ADMIN — Medication 20 MILLIGRAM(S): at 05:11

## 2022-01-01 RX ADMIN — Medication 650 MILLIGRAM(S): at 22:33

## 2022-01-01 RX ADMIN — AMPICILLIN SODIUM AND SULBACTAM SODIUM 200 GRAM(S): 250; 125 INJECTION, POWDER, FOR SUSPENSION INTRAMUSCULAR; INTRAVENOUS at 17:39

## 2022-01-01 RX ADMIN — PIPERACILLIN AND TAZOBACTAM 25 GRAM(S): 4; .5 INJECTION, POWDER, LYOPHILIZED, FOR SOLUTION INTRAVENOUS at 21:57

## 2022-01-01 RX ADMIN — AMPICILLIN SODIUM AND SULBACTAM SODIUM 200 GRAM(S): 250; 125 INJECTION, POWDER, FOR SUSPENSION INTRAMUSCULAR; INTRAVENOUS at 23:11

## 2022-01-01 RX ADMIN — Medication 166.67 MILLIGRAM(S): at 06:45

## 2022-01-01 RX ADMIN — PIPERACILLIN AND TAZOBACTAM 25 GRAM(S): 4; .5 INJECTION, POWDER, LYOPHILIZED, FOR SOLUTION INTRAVENOUS at 22:15

## 2022-01-01 RX ADMIN — TAMSULOSIN HYDROCHLORIDE 0.4 MILLIGRAM(S): 0.4 CAPSULE ORAL at 21:38

## 2022-01-01 RX ADMIN — CARBAMIDE PEROXIDE 1 DROP(S): 81.86 SOLUTION/ DROPS AURICULAR (OTIC) at 22:03

## 2022-01-01 RX ADMIN — HEPARIN SODIUM 5000 UNIT(S): 5000 INJECTION INTRAVENOUS; SUBCUTANEOUS at 21:55

## 2022-01-01 RX ADMIN — HEPARIN SODIUM 5000 UNIT(S): 5000 INJECTION INTRAVENOUS; SUBCUTANEOUS at 13:19

## 2022-01-01 RX ADMIN — ATORVASTATIN CALCIUM 20 MILLIGRAM(S): 80 TABLET, FILM COATED ORAL at 21:10

## 2022-01-01 RX ADMIN — Medication 6: at 08:09

## 2022-01-01 RX ADMIN — SODIUM CHLORIDE 75 MILLILITER(S): 9 INJECTION, SOLUTION INTRAVENOUS at 18:36

## 2022-01-01 RX ADMIN — CARBAMIDE PEROXIDE 1 DROP(S): 81.86 SOLUTION/ DROPS AURICULAR (OTIC) at 05:14

## 2022-01-01 RX ADMIN — Medication 2 GRAM(S): at 19:15

## 2022-01-01 RX ADMIN — PIPERACILLIN AND TAZOBACTAM 25 GRAM(S): 4; .5 INJECTION, POWDER, LYOPHILIZED, FOR SOLUTION INTRAVENOUS at 05:10

## 2022-01-01 RX ADMIN — PIPERACILLIN AND TAZOBACTAM 25 GRAM(S): 4; .5 INJECTION, POWDER, LYOPHILIZED, FOR SOLUTION INTRAVENOUS at 13:14

## 2022-01-01 RX ADMIN — Medication 10 UNIT(S): at 16:52

## 2022-01-01 RX ADMIN — Medication 400 MILLIGRAM(S): at 19:26

## 2022-01-01 RX ADMIN — SODIUM CHLORIDE 250 MILLILITER(S): 9 INJECTION INTRAMUSCULAR; INTRAVENOUS; SUBCUTANEOUS at 12:19

## 2022-01-01 RX ADMIN — Medication 20 MILLIGRAM(S): at 22:05

## 2022-01-01 RX ADMIN — CHLORHEXIDINE GLUCONATE 1 APPLICATION(S): 213 SOLUTION TOPICAL at 12:45

## 2022-01-01 RX ADMIN — INSULIN GLARGINE 10 UNIT(S): 100 INJECTION, SOLUTION SUBCUTANEOUS at 00:00

## 2022-01-01 RX ADMIN — Medication 2 GRAM(S): at 22:13

## 2022-01-01 RX ADMIN — Medication 1 MILLIGRAM(S): at 11:16

## 2022-01-01 RX ADMIN — VALGANCICLOVIR 450 MILLIGRAM(S): 450 TABLET, FILM COATED ORAL at 09:03

## 2022-01-01 RX ADMIN — PANTOPRAZOLE SODIUM 40 MILLIGRAM(S): 20 TABLET, DELAYED RELEASE ORAL at 05:49

## 2022-01-01 RX ADMIN — HEPARIN SODIUM 5000 UNIT(S): 5000 INJECTION INTRAVENOUS; SUBCUTANEOUS at 17:52

## 2022-01-01 RX ADMIN — INSULIN GLARGINE 10 UNIT(S): 100 INJECTION, SOLUTION SUBCUTANEOUS at 21:54

## 2022-01-01 RX ADMIN — EVEROLIMUS 3 MILLIGRAM(S): 10 TABLET ORAL at 08:55

## 2022-01-01 RX ADMIN — HEPARIN SODIUM 5000 UNIT(S): 5000 INJECTION INTRAVENOUS; SUBCUTANEOUS at 22:17

## 2022-01-01 RX ADMIN — Medication 1 TABLET(S): at 11:20

## 2022-01-01 RX ADMIN — Medication 2: at 12:39

## 2022-01-01 RX ADMIN — Medication 10 MILLILITER(S): at 09:00

## 2022-01-01 RX ADMIN — MYCOPHENOLATE MOFETIL 500 MILLIGRAM(S): 250 CAPSULE ORAL at 17:39

## 2022-01-01 RX ADMIN — INSULIN GLARGINE 10 UNIT(S): 100 INJECTION, SOLUTION SUBCUTANEOUS at 21:30

## 2022-01-01 RX ADMIN — PANTOPRAZOLE SODIUM 40 MILLIGRAM(S): 20 TABLET, DELAYED RELEASE ORAL at 06:08

## 2022-01-01 RX ADMIN — CARBAMIDE PEROXIDE 1 DROP(S): 81.86 SOLUTION/ DROPS AURICULAR (OTIC) at 17:54

## 2022-01-01 RX ADMIN — Medication 10 UNIT(S): at 17:50

## 2022-01-01 RX ADMIN — INSULIN GLARGINE 14 UNIT(S): 100 INJECTION, SOLUTION SUBCUTANEOUS at 22:27

## 2022-01-01 RX ADMIN — VALGANCICLOVIR 450 MILLIGRAM(S): 450 TABLET, FILM COATED ORAL at 18:42

## 2022-01-01 RX ADMIN — CEFEPIME 100 MILLIGRAM(S): 1 INJECTION, POWDER, FOR SOLUTION INTRAMUSCULAR; INTRAVENOUS at 14:56

## 2022-01-01 RX ADMIN — Medication 20 MILLIGRAM(S): at 05:33

## 2022-01-01 RX ADMIN — EVEROLIMUS 4 MILLIGRAM(S): 10 TABLET ORAL at 10:34

## 2022-01-01 RX ADMIN — Medication 20 MILLIGRAM(S): at 05:52

## 2022-01-01 RX ADMIN — Medication 20 MILLIGRAM(S): at 05:21

## 2022-01-01 RX ADMIN — VALGANCICLOVIR 450 MILLIGRAM(S): 450 TABLET, FILM COATED ORAL at 08:33

## 2022-01-01 RX ADMIN — Medication 2: at 09:04

## 2022-01-01 RX ADMIN — CARBAMIDE PEROXIDE 1 DROP(S): 81.86 SOLUTION/ DROPS AURICULAR (OTIC) at 05:46

## 2022-01-01 RX ADMIN — PANTOPRAZOLE SODIUM 40 MILLIGRAM(S): 20 TABLET, DELAYED RELEASE ORAL at 05:07

## 2022-01-01 RX ADMIN — Medication 4: at 17:23

## 2022-01-01 RX ADMIN — PANTOPRAZOLE SODIUM 40 MILLIGRAM(S): 20 TABLET, DELAYED RELEASE ORAL at 06:58

## 2022-01-01 RX ADMIN — Medication 15 GRAM(S): at 13:19

## 2022-01-01 RX ADMIN — Medication 10 MILLIGRAM(S): at 13:15

## 2022-01-01 RX ADMIN — Medication 4: at 18:29

## 2022-01-01 RX ADMIN — HEPARIN SODIUM 5000 UNIT(S): 5000 INJECTION INTRAVENOUS; SUBCUTANEOUS at 13:43

## 2022-01-01 RX ADMIN — INSULIN GLARGINE 5 UNIT(S): 100 INJECTION, SOLUTION SUBCUTANEOUS at 22:12

## 2022-01-01 RX ADMIN — MYCOPHENOLATE MOFETIL 500 MILLIGRAM(S): 250 CAPSULE ORAL at 17:08

## 2022-01-01 RX ADMIN — Medication 1 MILLIGRAM(S): at 14:55

## 2022-01-01 RX ADMIN — Medication 1 DROP(S): at 05:49

## 2022-01-01 RX ADMIN — PIPERACILLIN AND TAZOBACTAM 25 GRAM(S): 4; .5 INJECTION, POWDER, LYOPHILIZED, FOR SOLUTION INTRAVENOUS at 18:41

## 2022-01-01 RX ADMIN — Medication 1 DROP(S): at 15:21

## 2022-01-01 RX ADMIN — Medication 1 APPLICATION(S): at 12:21

## 2022-01-01 RX ADMIN — HEPARIN SODIUM 5000 UNIT(S): 5000 INJECTION INTRAVENOUS; SUBCUTANEOUS at 06:21

## 2022-01-01 RX ADMIN — Medication 250 MILLIGRAM(S): at 21:58

## 2022-01-01 RX ADMIN — Medication 1 DROP(S): at 17:37

## 2022-01-01 RX ADMIN — Medication 2: at 09:29

## 2022-01-01 RX ADMIN — Medication 20 MILLIGRAM(S): at 06:20

## 2022-01-01 RX ADMIN — Medication 1 MILLIGRAM(S): at 11:58

## 2022-01-01 RX ADMIN — PANTOPRAZOLE SODIUM 40 MILLIGRAM(S): 20 TABLET, DELAYED RELEASE ORAL at 05:11

## 2022-01-01 RX ADMIN — CARBAMIDE PEROXIDE 1 DROP(S): 81.86 SOLUTION/ DROPS AURICULAR (OTIC) at 05:53

## 2022-01-01 RX ADMIN — Medication 650 MILLIGRAM(S): at 06:58

## 2022-01-01 RX ADMIN — CHLORHEXIDINE GLUCONATE 1 APPLICATION(S): 213 SOLUTION TOPICAL at 11:15

## 2022-01-01 RX ADMIN — Medication 3: at 21:18

## 2022-01-01 RX ADMIN — Medication 10 UNIT(S): at 08:35

## 2022-01-01 RX ADMIN — PIPERACILLIN AND TAZOBACTAM 25 GRAM(S): 4; .5 INJECTION, POWDER, LYOPHILIZED, FOR SOLUTION INTRAVENOUS at 13:20

## 2022-01-01 RX ADMIN — Medication 15 UNIT(S): at 08:09

## 2022-01-01 RX ADMIN — MYCOPHENOLATE MOFETIL 250 MILLIGRAM(S): 250 CAPSULE ORAL at 05:19

## 2022-01-01 RX ADMIN — HEPARIN SODIUM 5000 UNIT(S): 5000 INJECTION INTRAVENOUS; SUBCUTANEOUS at 17:40

## 2022-01-01 RX ADMIN — Medication 2 GRAM(S): at 08:48

## 2022-01-01 RX ADMIN — CHLORHEXIDINE GLUCONATE 1 APPLICATION(S): 213 SOLUTION TOPICAL at 16:16

## 2022-01-01 RX ADMIN — Medication 2: at 08:24

## 2022-01-01 RX ADMIN — CEFEPIME 100 MILLIGRAM(S): 1 INJECTION, POWDER, FOR SOLUTION INTRAMUSCULAR; INTRAVENOUS at 06:13

## 2022-01-01 RX ADMIN — PANTOPRAZOLE SODIUM 40 MILLIGRAM(S): 20 TABLET, DELAYED RELEASE ORAL at 06:37

## 2022-01-01 RX ADMIN — Medication 20 MILLIGRAM(S): at 05:32

## 2022-01-01 RX ADMIN — Medication 1: at 14:19

## 2022-01-01 RX ADMIN — Medication 1: at 08:54

## 2022-01-01 RX ADMIN — SERTRALINE 100 MILLIGRAM(S): 25 TABLET, FILM COATED ORAL at 22:17

## 2022-01-01 RX ADMIN — PIPERACILLIN AND TAZOBACTAM 25 GRAM(S): 4; .5 INJECTION, POWDER, LYOPHILIZED, FOR SOLUTION INTRAVENOUS at 13:21

## 2022-01-01 RX ADMIN — AMPICILLIN SODIUM AND SULBACTAM SODIUM 200 GRAM(S): 250; 125 INJECTION, POWDER, FOR SUSPENSION INTRAMUSCULAR; INTRAVENOUS at 23:01

## 2022-01-01 RX ADMIN — INSULIN GLARGINE 10 UNIT(S): 100 INJECTION, SOLUTION SUBCUTANEOUS at 21:37

## 2022-01-01 RX ADMIN — Medication 975 MILLIGRAM(S): at 23:25

## 2022-01-01 RX ADMIN — PIPERACILLIN AND TAZOBACTAM 25 GRAM(S): 4; .5 INJECTION, POWDER, LYOPHILIZED, FOR SOLUTION INTRAVENOUS at 05:19

## 2022-01-01 RX ADMIN — Medication 1 MILLIGRAM(S): at 09:05

## 2022-01-01 RX ADMIN — Medication 2: at 06:44

## 2022-01-01 RX ADMIN — CHLORHEXIDINE GLUCONATE 1 APPLICATION(S): 213 SOLUTION TOPICAL at 09:27

## 2022-01-01 RX ADMIN — CHLORHEXIDINE GLUCONATE 1 APPLICATION(S): 213 SOLUTION TOPICAL at 17:02

## 2022-01-01 RX ADMIN — PANTOPRAZOLE SODIUM 40 MILLIGRAM(S): 20 TABLET, DELAYED RELEASE ORAL at 08:09

## 2022-01-01 RX ADMIN — INSULIN GLARGINE 14 UNIT(S): 100 INJECTION, SOLUTION SUBCUTANEOUS at 22:03

## 2022-01-01 RX ADMIN — HEPARIN SODIUM 5000 UNIT(S): 5000 INJECTION INTRAVENOUS; SUBCUTANEOUS at 22:03

## 2022-01-01 RX ADMIN — PANTOPRAZOLE SODIUM 40 MILLIGRAM(S): 20 TABLET, DELAYED RELEASE ORAL at 05:33

## 2022-01-01 RX ADMIN — HEPARIN SODIUM 5000 UNIT(S): 5000 INJECTION INTRAVENOUS; SUBCUTANEOUS at 06:09

## 2022-01-01 RX ADMIN — PIPERACILLIN AND TAZOBACTAM 25 GRAM(S): 4; .5 INJECTION, POWDER, LYOPHILIZED, FOR SOLUTION INTRAVENOUS at 23:00

## 2022-01-01 RX ADMIN — EVEROLIMUS 4 MILLIGRAM(S): 10 TABLET ORAL at 08:11

## 2022-01-01 RX ADMIN — PIPERACILLIN AND TAZOBACTAM 25 GRAM(S): 4; .5 INJECTION, POWDER, LYOPHILIZED, FOR SOLUTION INTRAVENOUS at 20:04

## 2022-01-01 RX ADMIN — PIPERACILLIN AND TAZOBACTAM 25 GRAM(S): 4; .5 INJECTION, POWDER, LYOPHILIZED, FOR SOLUTION INTRAVENOUS at 03:00

## 2022-01-01 RX ADMIN — Medication 975 MILLIGRAM(S): at 19:16

## 2022-01-01 RX ADMIN — Medication 2: at 17:49

## 2022-01-01 RX ADMIN — PIPERACILLIN AND TAZOBACTAM 25 GRAM(S): 4; .5 INJECTION, POWDER, LYOPHILIZED, FOR SOLUTION INTRAVENOUS at 05:14

## 2022-01-01 RX ADMIN — PIPERACILLIN AND TAZOBACTAM 25 GRAM(S): 4; .5 INJECTION, POWDER, LYOPHILIZED, FOR SOLUTION INTRAVENOUS at 13:05

## 2022-01-01 RX ADMIN — Medication 4: at 12:19

## 2022-01-01 RX ADMIN — PIPERACILLIN AND TAZOBACTAM 25 GRAM(S): 4; .5 INJECTION, POWDER, LYOPHILIZED, FOR SOLUTION INTRAVENOUS at 02:38

## 2022-01-01 RX ADMIN — MYCOPHENOLATE MOFETIL 250 MILLIGRAM(S): 250 CAPSULE ORAL at 09:27

## 2022-01-01 RX ADMIN — SERTRALINE 100 MILLIGRAM(S): 25 TABLET, FILM COATED ORAL at 11:54

## 2022-01-01 RX ADMIN — Medication 10 UNIT(S): at 13:35

## 2022-01-01 RX ADMIN — Medication 2: at 13:02

## 2022-01-01 RX ADMIN — PIPERACILLIN AND TAZOBACTAM 25 GRAM(S): 4; .5 INJECTION, POWDER, LYOPHILIZED, FOR SOLUTION INTRAVENOUS at 05:31

## 2022-01-01 RX ADMIN — Medication 2 GRAM(S): at 19:50

## 2022-01-01 RX ADMIN — PIPERACILLIN AND TAZOBACTAM 200 GRAM(S): 4; .5 INJECTION, POWDER, LYOPHILIZED, FOR SOLUTION INTRAVENOUS at 05:24

## 2022-01-01 RX ADMIN — VALGANCICLOVIR 450 MILLIGRAM(S): 450 TABLET, FILM COATED ORAL at 17:18

## 2022-01-01 RX ADMIN — Medication 2: at 17:57

## 2022-01-01 RX ADMIN — Medication 6 MILLIGRAM(S): at 06:09

## 2022-01-01 RX ADMIN — HEPARIN SODIUM 5000 UNIT(S): 5000 INJECTION INTRAVENOUS; SUBCUTANEOUS at 18:19

## 2022-01-01 RX ADMIN — Medication 8 UNIT(S): at 17:07

## 2022-01-01 RX ADMIN — HEPARIN SODIUM 5000 UNIT(S): 5000 INJECTION INTRAVENOUS; SUBCUTANEOUS at 05:22

## 2022-01-01 RX ADMIN — SERTRALINE 100 MILLIGRAM(S): 25 TABLET, FILM COATED ORAL at 11:13

## 2022-01-01 RX ADMIN — INSULIN GLARGINE 14 UNIT(S): 100 INJECTION, SOLUTION SUBCUTANEOUS at 22:17

## 2022-01-01 RX ADMIN — Medication 12 UNIT(S): at 11:15

## 2022-01-01 RX ADMIN — PIPERACILLIN AND TAZOBACTAM 25 GRAM(S): 4; .5 INJECTION, POWDER, LYOPHILIZED, FOR SOLUTION INTRAVENOUS at 05:33

## 2022-01-01 RX ADMIN — Medication 650 MILLIGRAM(S): at 23:30

## 2022-01-01 RX ADMIN — PIPERACILLIN AND TAZOBACTAM 25 GRAM(S): 4; .5 INJECTION, POWDER, LYOPHILIZED, FOR SOLUTION INTRAVENOUS at 21:59

## 2022-01-01 RX ADMIN — AMPICILLIN SODIUM AND SULBACTAM SODIUM 200 GRAM(S): 250; 125 INJECTION, POWDER, FOR SUSPENSION INTRAMUSCULAR; INTRAVENOUS at 05:48

## 2022-01-01 RX ADMIN — MYCOPHENOLATE MOFETIL 500 MILLIGRAM(S): 250 CAPSULE ORAL at 05:51

## 2022-01-01 RX ADMIN — VALGANCICLOVIR 450 MILLIGRAM(S): 450 TABLET, FILM COATED ORAL at 05:19

## 2022-01-01 RX ADMIN — AMPICILLIN SODIUM AND SULBACTAM SODIUM 200 GRAM(S): 250; 125 INJECTION, POWDER, FOR SUSPENSION INTRAMUSCULAR; INTRAVENOUS at 23:20

## 2022-01-01 RX ADMIN — HEPARIN SODIUM 5000 UNIT(S): 5000 INJECTION INTRAVENOUS; SUBCUTANEOUS at 15:24

## 2022-01-01 RX ADMIN — Medication 2 GRAM(S): at 08:55

## 2022-01-01 RX ADMIN — MYCOPHENOLATE MOFETIL 250 MILLIGRAM(S): 250 CAPSULE ORAL at 08:27

## 2022-01-01 RX ADMIN — Medication 2: at 08:46

## 2022-01-01 RX ADMIN — PIPERACILLIN AND TAZOBACTAM 25 GRAM(S): 4; .5 INJECTION, POWDER, LYOPHILIZED, FOR SOLUTION INTRAVENOUS at 05:11

## 2022-01-01 RX ADMIN — Medication 3: at 11:58

## 2022-01-01 RX ADMIN — Medication 4: at 17:03

## 2022-01-01 RX ADMIN — MYCOPHENOLATE MOFETIL 500 MILLIGRAM(S): 250 CAPSULE ORAL at 05:11

## 2022-01-01 RX ADMIN — AMPICILLIN SODIUM AND SULBACTAM SODIUM 200 GRAM(S): 250; 125 INJECTION, POWDER, FOR SUSPENSION INTRAMUSCULAR; INTRAVENOUS at 13:03

## 2022-01-01 RX ADMIN — PIPERACILLIN AND TAZOBACTAM 25 GRAM(S): 4; .5 INJECTION, POWDER, LYOPHILIZED, FOR SOLUTION INTRAVENOUS at 21:52

## 2022-01-01 RX ADMIN — EVEROLIMUS 4 MILLIGRAM(S): 10 TABLET ORAL at 18:07

## 2022-01-01 RX ADMIN — Medication 12 UNIT(S): at 17:24

## 2022-01-01 RX ADMIN — Medication 10 UNIT(S): at 18:27

## 2022-01-01 RX ADMIN — Medication 2 GRAM(S): at 22:05

## 2022-01-01 RX ADMIN — Medication 2: at 13:36

## 2022-01-01 RX ADMIN — PANTOPRAZOLE SODIUM 40 MILLIGRAM(S): 20 TABLET, DELAYED RELEASE ORAL at 05:32

## 2022-01-01 RX ADMIN — AMPICILLIN SODIUM AND SULBACTAM SODIUM 200 GRAM(S): 250; 125 INJECTION, POWDER, FOR SUSPENSION INTRAMUSCULAR; INTRAVENOUS at 05:13

## 2022-01-01 RX ADMIN — Medication 8 UNIT(S): at 08:46

## 2022-01-01 RX ADMIN — MYCOPHENOLATE MOFETIL 500 MILLIGRAM(S): 250 CAPSULE ORAL at 17:36

## 2022-01-01 RX ADMIN — Medication 10 MILLIGRAM(S): at 05:45

## 2022-01-01 RX ADMIN — MYCOPHENOLATE MOFETIL 500 MILLIGRAM(S): 250 CAPSULE ORAL at 05:14

## 2022-01-01 RX ADMIN — Medication 2 GRAM(S): at 08:27

## 2022-01-01 RX ADMIN — PIPERACILLIN AND TAZOBACTAM 200 GRAM(S): 4; .5 INJECTION, POWDER, LYOPHILIZED, FOR SOLUTION INTRAVENOUS at 21:52

## 2022-01-01 RX ADMIN — MYCOPHENOLATE MOFETIL 500 MILLIGRAM(S): 250 CAPSULE ORAL at 17:17

## 2022-01-01 RX ADMIN — VALGANCICLOVIR 450 MILLIGRAM(S): 450 TABLET, FILM COATED ORAL at 13:16

## 2022-01-01 RX ADMIN — CHLORHEXIDINE GLUCONATE 1 APPLICATION(S): 213 SOLUTION TOPICAL at 12:35

## 2022-01-01 RX ADMIN — CHLORHEXIDINE GLUCONATE 1 APPLICATION(S): 213 SOLUTION TOPICAL at 12:22

## 2022-01-01 RX ADMIN — PIPERACILLIN AND TAZOBACTAM 25 GRAM(S): 4; .5 INJECTION, POWDER, LYOPHILIZED, FOR SOLUTION INTRAVENOUS at 10:56

## 2022-01-01 RX ADMIN — Medication 2: at 12:34

## 2022-01-01 RX ADMIN — Medication 3: at 17:51

## 2022-01-01 RX ADMIN — CEFEPIME 100 MILLIGRAM(S): 1 INJECTION, POWDER, FOR SOLUTION INTRAMUSCULAR; INTRAVENOUS at 22:27

## 2022-01-01 RX ADMIN — PANTOPRAZOLE SODIUM 40 MILLIGRAM(S): 20 TABLET, DELAYED RELEASE ORAL at 05:13

## 2022-01-01 RX ADMIN — Medication 1 MILLIGRAM(S): at 11:14

## 2022-01-01 RX ADMIN — Medication 20 MILLIGRAM(S): at 05:18

## 2022-01-01 RX ADMIN — Medication 1 MILLIGRAM(S): at 09:02

## 2022-01-01 RX ADMIN — PANTOPRAZOLE SODIUM 40 MILLIGRAM(S): 20 TABLET, DELAYED RELEASE ORAL at 05:52

## 2022-01-01 RX ADMIN — Medication 20 MILLIGRAM(S): at 05:14

## 2022-01-01 RX ADMIN — SENNA PLUS 2 TABLET(S): 8.6 TABLET ORAL at 21:38

## 2022-01-01 RX ADMIN — INSULIN GLARGINE 5 UNIT(S): 100 INJECTION, SOLUTION SUBCUTANEOUS at 21:53

## 2022-01-01 RX ADMIN — Medication 650 MILLIGRAM(S): at 23:37

## 2022-01-01 RX ADMIN — HEPARIN SODIUM 5000 UNIT(S): 5000 INJECTION INTRAVENOUS; SUBCUTANEOUS at 18:56

## 2022-01-01 RX ADMIN — CHLORHEXIDINE GLUCONATE 1 APPLICATION(S): 213 SOLUTION TOPICAL at 12:48

## 2022-01-01 RX ADMIN — PANTOPRAZOLE SODIUM 40 MILLIGRAM(S): 20 TABLET, DELAYED RELEASE ORAL at 05:53

## 2022-01-01 RX ADMIN — ATORVASTATIN CALCIUM 40 MILLIGRAM(S): 80 TABLET, FILM COATED ORAL at 21:14

## 2022-01-01 RX ADMIN — Medication 650 MILLIGRAM(S): at 10:20

## 2022-01-01 RX ADMIN — VALGANCICLOVIR 450 MILLIGRAM(S): 450 TABLET, FILM COATED ORAL at 11:54

## 2022-01-01 RX ADMIN — Medication 15 UNIT(S): at 11:22

## 2022-01-01 RX ADMIN — Medication 15 UNIT(S): at 16:52

## 2022-01-01 RX ADMIN — Medication 3: at 12:09

## 2022-01-01 RX ADMIN — SERTRALINE 100 MILLIGRAM(S): 25 TABLET, FILM COATED ORAL at 12:10

## 2022-01-01 RX ADMIN — Medication 10 UNIT(S): at 19:26

## 2022-01-01 RX ADMIN — VALGANCICLOVIR 450 MILLIGRAM(S): 450 TABLET, FILM COATED ORAL at 13:04

## 2022-01-01 RX ADMIN — PANTOPRAZOLE SODIUM 40 MILLIGRAM(S): 20 TABLET, DELAYED RELEASE ORAL at 05:14

## 2022-01-01 RX ADMIN — PIPERACILLIN AND TAZOBACTAM 25 GRAM(S): 4; .5 INJECTION, POWDER, LYOPHILIZED, FOR SOLUTION INTRAVENOUS at 13:36

## 2022-01-01 RX ADMIN — MYCOPHENOLATE MOFETIL 250 MILLIGRAM(S): 250 CAPSULE ORAL at 17:18

## 2022-01-01 RX ADMIN — CARBAMIDE PEROXIDE 1 DROP(S): 81.86 SOLUTION/ DROPS AURICULAR (OTIC) at 19:56

## 2022-01-01 RX ADMIN — Medication 1 MILLIGRAM(S): at 08:41

## 2022-01-01 RX ADMIN — Medication 1: at 13:16

## 2022-01-01 RX ADMIN — CARBAMIDE PEROXIDE 1 DROP(S): 81.86 SOLUTION/ DROPS AURICULAR (OTIC) at 17:38

## 2022-01-01 RX ADMIN — PANTOPRAZOLE SODIUM 40 MILLIGRAM(S): 20 TABLET, DELAYED RELEASE ORAL at 14:55

## 2022-01-01 RX ADMIN — TAMSULOSIN HYDROCHLORIDE 0.4 MILLIGRAM(S): 0.4 CAPSULE ORAL at 21:30

## 2022-01-01 RX ADMIN — PANTOPRAZOLE SODIUM 40 MILLIGRAM(S): 20 TABLET, DELAYED RELEASE ORAL at 06:13

## 2022-01-01 RX ADMIN — MYCOPHENOLATE MOFETIL 250 MILLIGRAM(S): 250 CAPSULE ORAL at 22:31

## 2022-01-01 RX ADMIN — HEPARIN SODIUM 5000 UNIT(S): 5000 INJECTION INTRAVENOUS; SUBCUTANEOUS at 05:49

## 2022-01-01 RX ADMIN — SERTRALINE 100 MILLIGRAM(S): 25 TABLET, FILM COATED ORAL at 09:02

## 2022-01-01 RX ADMIN — Medication 1 DROP(S): at 05:40

## 2022-01-01 RX ADMIN — Medication 2: at 09:23

## 2022-01-01 RX ADMIN — Medication 650 MILLIGRAM(S): at 11:20

## 2022-01-01 RX ADMIN — SERTRALINE 100 MILLIGRAM(S): 25 TABLET, FILM COATED ORAL at 12:36

## 2022-01-01 RX ADMIN — Medication 8 UNIT(S): at 12:20

## 2022-01-01 RX ADMIN — SERTRALINE 100 MILLIGRAM(S): 25 TABLET, FILM COATED ORAL at 08:41

## 2022-01-01 RX ADMIN — Medication 1: at 08:08

## 2022-01-01 RX ADMIN — Medication 3: at 17:10

## 2022-01-01 RX ADMIN — CARBAMIDE PEROXIDE 1 DROP(S): 81.86 SOLUTION/ DROPS AURICULAR (OTIC) at 05:48

## 2022-01-01 RX ADMIN — Medication 3: at 12:23

## 2022-01-01 RX ADMIN — MYCOPHENOLATE MOFETIL 500 MILLIGRAM(S): 250 CAPSULE ORAL at 05:32

## 2022-01-01 RX ADMIN — CEFEPIME 100 MILLIGRAM(S): 1 INJECTION, POWDER, FOR SOLUTION INTRAMUSCULAR; INTRAVENOUS at 17:56

## 2022-01-01 RX ADMIN — Medication 2: at 11:36

## 2022-01-01 RX ADMIN — Medication 8 UNIT(S): at 12:34

## 2022-01-01 RX ADMIN — Medication 2 GRAM(S): at 08:41

## 2022-01-01 RX ADMIN — PIPERACILLIN AND TAZOBACTAM 25 GRAM(S): 4; .5 INJECTION, POWDER, LYOPHILIZED, FOR SOLUTION INTRAVENOUS at 05:06

## 2022-01-01 RX ADMIN — MYCOPHENOLATE MOFETIL 250 MILLIGRAM(S): 250 CAPSULE ORAL at 19:56

## 2022-01-01 RX ADMIN — AMPICILLIN SODIUM AND SULBACTAM SODIUM 200 GRAM(S): 250; 125 INJECTION, POWDER, FOR SUSPENSION INTRAMUSCULAR; INTRAVENOUS at 05:54

## 2022-01-01 RX ADMIN — SODIUM CHLORIDE 2000 MILLILITER(S): 9 INJECTION INTRAMUSCULAR; INTRAVENOUS; SUBCUTANEOUS at 20:04

## 2022-01-01 RX ADMIN — MYCOPHENOLATE MOFETIL 250 MILLIGRAM(S): 250 CAPSULE ORAL at 20:16

## 2022-01-01 RX ADMIN — AMPICILLIN SODIUM AND SULBACTAM SODIUM 200 GRAM(S): 250; 125 INJECTION, POWDER, FOR SUSPENSION INTRAMUSCULAR; INTRAVENOUS at 22:31

## 2022-01-01 RX ADMIN — SERTRALINE 100 MILLIGRAM(S): 25 TABLET, FILM COATED ORAL at 06:42

## 2022-01-01 RX ADMIN — AMPICILLIN SODIUM AND SULBACTAM SODIUM 200 GRAM(S): 250; 125 INJECTION, POWDER, FOR SUSPENSION INTRAMUSCULAR; INTRAVENOUS at 05:39

## 2022-01-01 RX ADMIN — Medication 1 MILLIGRAM(S): at 08:48

## 2022-01-01 RX ADMIN — Medication 10 UNIT(S): at 14:17

## 2022-01-01 RX ADMIN — PIPERACILLIN AND TAZOBACTAM 25 GRAM(S): 4; .5 INJECTION, POWDER, LYOPHILIZED, FOR SOLUTION INTRAVENOUS at 06:21

## 2022-01-01 RX ADMIN — VALGANCICLOVIR 450 MILLIGRAM(S): 450 TABLET, FILM COATED ORAL at 08:47

## 2022-01-01 RX ADMIN — PIPERACILLIN AND TAZOBACTAM 25 GRAM(S): 4; .5 INJECTION, POWDER, LYOPHILIZED, FOR SOLUTION INTRAVENOUS at 13:59

## 2022-01-01 RX ADMIN — Medication 1: at 13:17

## 2022-01-01 RX ADMIN — Medication 3 MILLIGRAM(S): at 22:31

## 2022-01-01 RX ADMIN — PIPERACILLIN AND TAZOBACTAM 25 GRAM(S): 4; .5 INJECTION, POWDER, LYOPHILIZED, FOR SOLUTION INTRAVENOUS at 14:52

## 2022-01-01 RX ADMIN — Medication 2 GRAM(S): at 09:02

## 2022-01-01 RX ADMIN — CHLORHEXIDINE GLUCONATE 1 APPLICATION(S): 213 SOLUTION TOPICAL at 12:55

## 2022-01-01 RX ADMIN — INSULIN GLARGINE 10 UNIT(S): 100 INJECTION, SOLUTION SUBCUTANEOUS at 21:20

## 2022-01-01 RX ADMIN — Medication 4: at 17:07

## 2022-01-01 RX ADMIN — VALGANCICLOVIR 450 MILLIGRAM(S): 450 TABLET, FILM COATED ORAL at 12:13

## 2022-01-01 RX ADMIN — Medication 1 TABLET(S): at 18:42

## 2022-01-01 RX ADMIN — SERTRALINE 100 MILLIGRAM(S): 25 TABLET, FILM COATED ORAL at 09:05

## 2022-01-01 RX ADMIN — PANTOPRAZOLE SODIUM 40 MILLIGRAM(S): 20 TABLET, DELAYED RELEASE ORAL at 05:40

## 2022-01-01 RX ADMIN — HEPARIN SODIUM 5000 UNIT(S): 5000 INJECTION INTRAVENOUS; SUBCUTANEOUS at 05:41

## 2022-01-01 RX ADMIN — Medication 4: at 16:52

## 2022-01-01 RX ADMIN — Medication 1 MILLIGRAM(S): at 13:35

## 2022-01-01 RX ADMIN — HEPARIN SODIUM 5000 UNIT(S): 5000 INJECTION INTRAVENOUS; SUBCUTANEOUS at 06:13

## 2022-01-01 RX ADMIN — HEPARIN SODIUM 5000 UNIT(S): 5000 INJECTION INTRAVENOUS; SUBCUTANEOUS at 21:30

## 2022-01-01 RX ADMIN — VALGANCICLOVIR 450 MILLIGRAM(S): 450 TABLET, FILM COATED ORAL at 11:21

## 2022-01-01 RX ADMIN — TAMSULOSIN HYDROCHLORIDE 0.4 MILLIGRAM(S): 0.4 CAPSULE ORAL at 21:10

## 2022-01-01 RX ADMIN — Medication 1 MILLIGRAM(S): at 12:35

## 2022-01-01 RX ADMIN — HEPARIN SODIUM 5000 UNIT(S): 5000 INJECTION INTRAVENOUS; SUBCUTANEOUS at 05:19

## 2022-01-01 RX ADMIN — SERTRALINE 100 MILLIGRAM(S): 25 TABLET, FILM COATED ORAL at 08:55

## 2022-01-01 RX ADMIN — Medication 2: at 17:07

## 2022-01-01 RX ADMIN — Medication 8 UNIT(S): at 17:04

## 2022-01-01 RX ADMIN — INSULIN GLARGINE 28 UNIT(S): 100 INJECTION, SOLUTION SUBCUTANEOUS at 21:23

## 2022-01-01 RX ADMIN — Medication 10 UNIT(S): at 17:52

## 2022-01-01 RX ADMIN — AMPICILLIN SODIUM AND SULBACTAM SODIUM 200 GRAM(S): 250; 125 INJECTION, POWDER, FOR SUSPENSION INTRAMUSCULAR; INTRAVENOUS at 12:30

## 2022-01-01 RX ADMIN — MYCOPHENOLATE MOFETIL 500 MILLIGRAM(S): 250 CAPSULE ORAL at 17:50

## 2022-01-01 RX ADMIN — Medication 10 UNIT(S): at 10:35

## 2022-01-01 RX ADMIN — PANTOPRAZOLE SODIUM 40 MILLIGRAM(S): 20 TABLET, DELAYED RELEASE ORAL at 05:54

## 2022-01-01 RX ADMIN — MYCOPHENOLATE MOFETIL 250 MILLIGRAM(S): 250 CAPSULE ORAL at 17:09

## 2022-01-01 RX ADMIN — Medication 3: at 13:33

## 2022-01-01 RX ADMIN — Medication 10 UNIT(S): at 09:22

## 2022-01-01 RX ADMIN — Medication 10 MILLIGRAM(S): at 05:53

## 2022-01-01 RX ADMIN — Medication 10 MILLIGRAM(S): at 05:14

## 2022-01-01 RX ADMIN — SERTRALINE 100 MILLIGRAM(S): 25 TABLET, FILM COATED ORAL at 11:58

## 2022-01-01 RX ADMIN — Medication 1 DROP(S): at 05:14

## 2022-01-01 RX ADMIN — Medication 4: at 16:45

## 2022-01-01 RX ADMIN — Medication 1 DROP(S): at 18:07

## 2022-01-01 RX ADMIN — MYCOPHENOLATE MOFETIL 500 MILLIGRAM(S): 250 CAPSULE ORAL at 06:43

## 2022-01-01 RX ADMIN — CEFEPIME 100 MILLIGRAM(S): 1 INJECTION, POWDER, FOR SOLUTION INTRAMUSCULAR; INTRAVENOUS at 20:04

## 2022-01-01 RX ADMIN — MYCOPHENOLATE MOFETIL 250 MILLIGRAM(S): 250 CAPSULE ORAL at 13:14

## 2022-01-01 RX ADMIN — Medication 650 MILLIGRAM(S): at 22:37

## 2022-01-01 RX ADMIN — SERTRALINE 100 MILLIGRAM(S): 25 TABLET, FILM COATED ORAL at 12:47

## 2022-01-01 RX ADMIN — Medication 12 UNIT(S): at 13:55

## 2022-01-01 RX ADMIN — PIPERACILLIN AND TAZOBACTAM 25 GRAM(S): 4; .5 INJECTION, POWDER, LYOPHILIZED, FOR SOLUTION INTRAVENOUS at 21:54

## 2022-01-01 RX ADMIN — SENNA PLUS 2 TABLET(S): 8.6 TABLET ORAL at 21:55

## 2022-01-01 RX ADMIN — Medication 2: at 12:26

## 2022-01-01 RX ADMIN — Medication 975 MILLIGRAM(S): at 20:15

## 2022-01-01 RX ADMIN — Medication 10 UNIT(S): at 17:57

## 2022-01-01 RX ADMIN — HEPARIN SODIUM 5000 UNIT(S): 5000 INJECTION INTRAVENOUS; SUBCUTANEOUS at 13:06

## 2022-01-01 RX ADMIN — Medication 250 MILLIGRAM(S): at 23:00

## 2022-01-01 RX ADMIN — SENNA PLUS 2 TABLET(S): 8.6 TABLET ORAL at 21:30

## 2022-01-01 RX ADMIN — Medication 1 DROP(S): at 18:44

## 2022-01-01 RX ADMIN — Medication 4: at 16:40

## 2022-01-01 RX ADMIN — VALGANCICLOVIR 450 MILLIGRAM(S): 450 TABLET, FILM COATED ORAL at 11:13

## 2022-01-01 RX ADMIN — SERTRALINE 100 MILLIGRAM(S): 25 TABLET, FILM COATED ORAL at 13:16

## 2022-01-01 RX ADMIN — MYCOPHENOLATE MOFETIL 500 MILLIGRAM(S): 250 CAPSULE ORAL at 22:05

## 2022-01-01 RX ADMIN — INSULIN GLARGINE 14 UNIT(S): 100 INJECTION, SOLUTION SUBCUTANEOUS at 22:06

## 2022-01-01 RX ADMIN — SERTRALINE 100 MILLIGRAM(S): 25 TABLET, FILM COATED ORAL at 08:33

## 2022-01-01 RX ADMIN — HEPARIN SODIUM 5000 UNIT(S): 5000 INJECTION INTRAVENOUS; SUBCUTANEOUS at 21:10

## 2022-01-01 RX ADMIN — Medication 10 MILLIGRAM(S): at 06:42

## 2022-01-01 RX ADMIN — INSULIN GLARGINE 5 UNIT(S): 100 INJECTION, SOLUTION SUBCUTANEOUS at 22:59

## 2022-01-01 RX ADMIN — Medication 2 PACKET(S): at 18:06

## 2022-01-01 RX ADMIN — INSULIN GLARGINE 14 UNIT(S): 100 INJECTION, SOLUTION SUBCUTANEOUS at 22:32

## 2022-01-01 RX ADMIN — TAMSULOSIN HYDROCHLORIDE 0.4 MILLIGRAM(S): 0.4 CAPSULE ORAL at 21:55

## 2022-01-01 RX ADMIN — Medication 2: at 09:01

## 2022-01-01 RX ADMIN — HEPARIN SODIUM 5000 UNIT(S): 5000 INJECTION INTRAVENOUS; SUBCUTANEOUS at 21:14

## 2022-01-01 RX ADMIN — CARBAMIDE PEROXIDE 1 DROP(S): 81.86 SOLUTION/ DROPS AURICULAR (OTIC) at 05:40

## 2022-01-01 RX ADMIN — Medication 3 MILLIGRAM(S): at 22:03

## 2022-01-01 RX ADMIN — Medication 4: at 12:35

## 2022-01-01 RX ADMIN — ERTAPENEM SODIUM 120 MILLIGRAM(S): 1 INJECTION, POWDER, LYOPHILIZED, FOR SOLUTION INTRAMUSCULAR; INTRAVENOUS at 05:53

## 2022-01-01 RX ADMIN — Medication 8 UNIT(S): at 09:01

## 2022-01-01 RX ADMIN — Medication 500000 UNIT(S): at 23:10

## 2022-01-01 RX ADMIN — AMPICILLIN SODIUM AND SULBACTAM SODIUM 200 GRAM(S): 250; 125 INJECTION, POWDER, FOR SUSPENSION INTRAMUSCULAR; INTRAVENOUS at 18:17

## 2022-01-01 RX ADMIN — Medication 10 UNIT(S): at 13:16

## 2022-01-01 RX ADMIN — HEPARIN SODIUM 5000 UNIT(S): 5000 INJECTION INTRAVENOUS; SUBCUTANEOUS at 13:09

## 2022-01-01 RX ADMIN — CHLORHEXIDINE GLUCONATE 1 APPLICATION(S): 213 SOLUTION TOPICAL at 12:14

## 2022-01-01 RX ADMIN — Medication 10 MILLIGRAM(S): at 11:13

## 2022-01-01 RX ADMIN — HEPARIN SODIUM 5000 UNIT(S): 5000 INJECTION INTRAVENOUS; SUBCUTANEOUS at 05:45

## 2022-01-01 RX ADMIN — Medication 1 DROP(S): at 05:52

## 2022-01-01 RX ADMIN — Medication 2: at 17:35

## 2022-01-01 RX ADMIN — VALGANCICLOVIR 450 MILLIGRAM(S): 450 TABLET, FILM COATED ORAL at 12:10

## 2022-01-01 RX ADMIN — Medication 4: at 12:14

## 2022-01-01 RX ADMIN — HEPARIN SODIUM 5000 UNIT(S): 5000 INJECTION INTRAVENOUS; SUBCUTANEOUS at 21:37

## 2022-01-01 RX ADMIN — PANTOPRAZOLE SODIUM 40 MILLIGRAM(S): 20 TABLET, DELAYED RELEASE ORAL at 06:42

## 2022-01-01 RX ADMIN — MYCOPHENOLATE MOFETIL 500 MILLIGRAM(S): 250 CAPSULE ORAL at 05:33

## 2022-01-01 RX ADMIN — Medication 500000 UNIT(S): at 11:20

## 2022-01-01 RX ADMIN — Medication 1 MILLIGRAM(S): at 08:55

## 2022-01-01 RX ADMIN — Medication 1 MILLIGRAM(S): at 08:33

## 2022-01-01 RX ADMIN — Medication 10 MILLIGRAM(S): at 05:49

## 2022-01-01 RX ADMIN — PANTOPRAZOLE SODIUM 40 MILLIGRAM(S): 20 TABLET, DELAYED RELEASE ORAL at 06:20

## 2022-01-01 RX ADMIN — Medication 1 APPLICATION(S): at 17:02

## 2022-01-01 RX ADMIN — AMPICILLIN SODIUM AND SULBACTAM SODIUM 200 GRAM(S): 250; 125 INJECTION, POWDER, FOR SUSPENSION INTRAMUSCULAR; INTRAVENOUS at 13:16

## 2022-01-01 RX ADMIN — MYCOPHENOLATE MOFETIL 250 MILLIGRAM(S): 250 CAPSULE ORAL at 09:22

## 2022-01-01 RX ADMIN — ATORVASTATIN CALCIUM 20 MILLIGRAM(S): 80 TABLET, FILM COATED ORAL at 21:38

## 2022-01-01 RX ADMIN — MYCOPHENOLATE MOFETIL 250 MILLIGRAM(S): 250 CAPSULE ORAL at 05:54

## 2022-01-01 RX ADMIN — Medication 10 UNIT(S): at 13:02

## 2022-01-01 RX ADMIN — MYCOPHENOLATE MOFETIL 500 MILLIGRAM(S): 250 CAPSULE ORAL at 18:42

## 2022-01-01 RX ADMIN — Medication 500000 UNIT(S): at 18:08

## 2022-01-01 RX ADMIN — Medication 166.67 MILLIGRAM(S): at 06:20

## 2022-01-01 RX ADMIN — Medication 20 MILLIGRAM(S): at 05:54

## 2022-01-01 RX ADMIN — Medication 1: at 17:53

## 2022-01-01 RX ADMIN — PIPERACILLIN AND TAZOBACTAM 25 GRAM(S): 4; .5 INJECTION, POWDER, LYOPHILIZED, FOR SOLUTION INTRAVENOUS at 15:17

## 2022-01-01 RX ADMIN — Medication 2: at 21:41

## 2022-01-01 RX ADMIN — Medication 2 GRAM(S): at 21:52

## 2022-01-01 RX ADMIN — Medication 6: at 11:22

## 2022-01-01 RX ADMIN — Medication 10 UNIT(S): at 08:46

## 2022-01-01 RX ADMIN — Medication 650 MILLIGRAM(S): at 23:00

## 2022-01-01 RX ADMIN — Medication 2 GRAM(S): at 09:05

## 2022-01-01 RX ADMIN — Medication 500000 UNIT(S): at 06:08

## 2022-01-01 RX ADMIN — Medication 10 MILLIGRAM(S): at 05:40

## 2022-01-01 RX ADMIN — AMPICILLIN SODIUM AND SULBACTAM SODIUM 200 GRAM(S): 250; 125 INJECTION, POWDER, FOR SUSPENSION INTRAMUSCULAR; INTRAVENOUS at 18:56

## 2022-01-01 RX ADMIN — CARBAMIDE PEROXIDE 1 DROP(S): 81.86 SOLUTION/ DROPS AURICULAR (OTIC) at 18:19

## 2022-01-01 RX ADMIN — CEFEPIME 100 MILLIGRAM(S): 1 INJECTION, POWDER, FOR SOLUTION INTRAMUSCULAR; INTRAVENOUS at 05:46

## 2022-01-01 RX ADMIN — AMPICILLIN SODIUM AND SULBACTAM SODIUM 200 GRAM(S): 250; 125 INJECTION, POWDER, FOR SUSPENSION INTRAMUSCULAR; INTRAVENOUS at 12:27

## 2022-01-01 RX ADMIN — Medication 1 DROP(S): at 18:18

## 2022-01-01 RX ADMIN — MYCOPHENOLATE MOFETIL 250 MILLIGRAM(S): 250 CAPSULE ORAL at 20:00

## 2022-01-01 RX ADMIN — Medication 12 UNIT(S): at 08:57

## 2022-01-01 RX ADMIN — INSULIN GLARGINE 10 UNIT(S): 100 INJECTION, SOLUTION SUBCUTANEOUS at 21:57

## 2022-01-01 RX ADMIN — Medication 1: at 17:27

## 2022-01-01 RX ADMIN — EVEROLIMUS 3 MILLIGRAM(S): 10 TABLET ORAL at 21:10

## 2022-01-01 RX ADMIN — Medication 8 UNIT(S): at 17:50

## 2022-01-01 RX ADMIN — HEPARIN SODIUM 5000 UNIT(S): 5000 INJECTION INTRAVENOUS; SUBCUTANEOUS at 13:37

## 2022-01-01 RX ADMIN — MYCOPHENOLATE MOFETIL 250 MILLIGRAM(S): 250 CAPSULE ORAL at 19:44

## 2022-01-01 RX ADMIN — CEFEPIME 100 MILLIGRAM(S): 1 INJECTION, POWDER, FOR SOLUTION INTRAMUSCULAR; INTRAVENOUS at 09:00

## 2022-01-01 RX ADMIN — MYCOPHENOLATE MOFETIL 250 MILLIGRAM(S): 250 CAPSULE ORAL at 05:22

## 2022-01-01 RX ADMIN — MYCOPHENOLATE MOFETIL 250 MILLIGRAM(S): 250 CAPSULE ORAL at 09:58

## 2022-01-01 RX ADMIN — SERTRALINE 100 MILLIGRAM(S): 25 TABLET, FILM COATED ORAL at 12:13

## 2022-01-01 RX ADMIN — Medication 1 MILLIGRAM(S): at 11:18

## 2022-01-01 RX ADMIN — CHLORHEXIDINE GLUCONATE 1 APPLICATION(S): 213 SOLUTION TOPICAL at 12:36

## 2022-01-01 RX ADMIN — SERTRALINE 100 MILLIGRAM(S): 25 TABLET, FILM COATED ORAL at 13:35

## 2022-01-01 RX ADMIN — CHLORHEXIDINE GLUCONATE 1 APPLICATION(S): 213 SOLUTION TOPICAL at 11:58

## 2022-01-01 RX ADMIN — INSULIN GLARGINE 5 UNIT(S): 100 INJECTION, SOLUTION SUBCUTANEOUS at 23:05

## 2022-01-01 RX ADMIN — Medication 1 MILLIGRAM(S): at 06:42

## 2022-01-01 RX ADMIN — PANTOPRAZOLE SODIUM 40 MILLIGRAM(S): 20 TABLET, DELAYED RELEASE ORAL at 05:29

## 2022-01-01 NOTE — PROGRESS NOTE ADULT - PROBLEM SELECTOR PLAN 6
Mildly elevated admission AST/ALT at 45/66 respectively. Recent ultrasound showed good graft flow.   - Transplant hepatology recs appreciated  - RUQ US showed coarsened hepatic echotexture   - Liver biopsy (12/30), path pending   - Will cont to monitor LFT's

## 2022-01-01 NOTE — PROGRESS NOTE ADULT - SUBJECTIVE AND OBJECTIVE BOX
PROGRESS NOTE:   Authored by Johnathan Ordonez MD  Pager: [facility] [phone]; [facility] [phone]    Patient is a 66y old  Male who presents with a chief complaint of Fall (31 Dec 2021 21:31)      SUBJECTIVE / OVERNIGHT EVENTS:    ADDITIONAL REVIEW OF SYSTEMS:    MEDICATIONS  (STANDING):  atorvastatin 40 milliGRAM(s) Oral at bedtime  dexAMETHasone     Tablet 6 milliGRAM(s) Oral daily  dextrose 40% Gel 15 Gram(s) Oral once  dextrose 5%. 1000 milliLiter(s) (50 mL/Hr) IV Continuous <Continuous>  dextrose 5%. 1000 milliLiter(s) (100 mL/Hr) IV Continuous <Continuous>  dextrose 50% Injectable 25 Gram(s) IV Push once  dextrose 50% Injectable 12.5 Gram(s) IV Push once  dextrose 50% Injectable 25 Gram(s) IV Push once  everolimus (ZORTRESS) 4 milliGRAM(s) Oral two times a day  glucagon  Injectable 1 milliGRAM(s) IntraMuscular once  heparin   Injectable 5000 Unit(s) SubCutaneous every 8 hours  insulin glargine Injectable (LANTUS) 20 Unit(s) SubCutaneous at bedtime  insulin lispro (ADMELOG) corrective regimen sliding scale   SubCutaneous three times a day before meals  insulin lispro (ADMELOG) corrective regimen sliding scale   SubCutaneous at bedtime  insulin lispro Injectable (ADMELOG) 15 Unit(s) SubCutaneous three times a day before meals  nystatin    Suspension 288053 Unit(s) Oral four times a day  pantoprazole    Tablet 40 milliGRAM(s) Oral two times a day  trimethoprim   80 mG/sulfamethoxazole 400 mG 1 Tablet(s) Oral daily  valGANciclovir 450 milliGRAM(s) Oral daily    MEDICATIONS  (PRN):  benzocaine 15 mG/menthol 3.6 mG (Sugar-Free) Lozenge 1 Lozenge Oral every 6 hours PRN Sore Throat  benzonatate 100 milliGRAM(s) Oral every 8 hours PRN Cough      CAPILLARY BLOOD GLUCOSE      POCT Blood Glucose.: 268 mg/dL (01 Jan 2022 07:24)  POCT Blood Glucose.: 344 mg/dL (31 Dec 2021 21:09)  POCT Blood Glucose.: 383 mg/dL (31 Dec 2021 16:39)  POCT Blood Glucose.: 293 mg/dL (31 Dec 2021 11:28)    I&O's Summary    31 Dec 2021 07:01  -  01 Jan 2022 07:00  --------------------------------------------------------  IN: 960 mL / OUT: 2320 mL / NET: -1360 mL        PHYSICAL EXAM:  Vital Signs Last 24 Hrs  T(C): 36.7 (01 Jan 2022 04:50), Max: 36.8 (31 Dec 2021 20:01)  T(F): 98.1 (01 Jan 2022 04:50), Max: 98.2 (31 Dec 2021 20:01)  HR: 80 (01 Jan 2022 04:50) (79 - 88)  BP: 125/62 (01 Jan 2022 04:50) (125/62 - 136/68)  BP(mean): --  RR: 18 (01 Jan 2022 04:50) (18 - 18)  SpO2: 92% (01 Jan 2022 04:50) (92% - 96%)    GENERAL: No acute distress, well-developed  HEAD:  Atraumatic, Normocephalic  EYES: EOMI, PERRLA, conjunctiva and sclera clear  NECK: Supple, no lymphadenopathy, no JVD  CHEST/LUNG: CTAB; No wheezes, rales, or rhonchi  HEART: Regular rate and rhythm; No murmurs, rubs, or gallops  ABDOMEN: Soft, non-tender, non-distended; normal bowel sounds, no organomegaly  EXTREMITIES:  2+ peripheral pulses b/l, No clubbing, cyanosis, or edema  NEUROLOGY: A&O x 3, no focal deficits  SKIN: No rashes or lesions    LABS:                        7.2    3.43  )-----------( 211      ( 31 Dec 2021 07:09 )             23.0     12-31    131<L>  |  101  |  39<H>  ----------------------------<  311<H>  4.9   |  19<L>  |  2.11<H>    Ca    9.0      31 Dec 2021 06:58  Phos  2.7     12-31  Mg     2.1     12-31    TPro  6.2  /  Alb  2.7<L>  /  TBili  0.2  /  DBili  x   /  AST  91<H>  /  ALT  182<H>  /  AlkPhos  431<H>  12-31    PT/INR - ( 31 Dec 2021 07:09 )   PT: 12.1 sec;   INR: 1.01 ratio                     RADIOLOGY & ADDITIONAL TESTS:  Results Reviewed:   Imaging Personally Reviewed:  Electrocardiogram Personally Reviewed:    COORDINATION OF CARE:  Care Discussed with Consultants/Other Providers [Y/N]:  Prior or Outpatient Records Reviewed [Y/N]:   PROGRESS NOTE:   Authored by Johnathan Ordonez MD  Pager: [facility] [phone]; [facility] [phone]    Patient is a 66y old  Male who presents with a chief complaint of Fall (31 Dec 2021 21:31)      SUBJECTIVE / OVERNIGHT EVENTS:  No acute events overnight. Still having mild cough and sore throat. Denies any abdominal pain. Currently on RA with no SOB     ADDITIONAL REVIEW OF SYSTEMS:    MEDICATIONS  (STANDING):  atorvastatin 40 milliGRAM(s) Oral at bedtime  dexAMETHasone     Tablet 6 milliGRAM(s) Oral daily  dextrose 40% Gel 15 Gram(s) Oral once  dextrose 5%. 1000 milliLiter(s) (50 mL/Hr) IV Continuous <Continuous>  dextrose 5%. 1000 milliLiter(s) (100 mL/Hr) IV Continuous <Continuous>  dextrose 50% Injectable 25 Gram(s) IV Push once  dextrose 50% Injectable 12.5 Gram(s) IV Push once  dextrose 50% Injectable 25 Gram(s) IV Push once  everolimus (ZORTRESS) 4 milliGRAM(s) Oral two times a day  glucagon  Injectable 1 milliGRAM(s) IntraMuscular once  heparin   Injectable 5000 Unit(s) SubCutaneous every 8 hours  insulin glargine Injectable (LANTUS) 20 Unit(s) SubCutaneous at bedtime  insulin lispro (ADMELOG) corrective regimen sliding scale   SubCutaneous three times a day before meals  insulin lispro (ADMELOG) corrective regimen sliding scale   SubCutaneous at bedtime  insulin lispro Injectable (ADMELOG) 15 Unit(s) SubCutaneous three times a day before meals  nystatin    Suspension 132793 Unit(s) Oral four times a day  pantoprazole    Tablet 40 milliGRAM(s) Oral two times a day  trimethoprim   80 mG/sulfamethoxazole 400 mG 1 Tablet(s) Oral daily  valGANciclovir 450 milliGRAM(s) Oral daily    MEDICATIONS  (PRN):  benzocaine 15 mG/menthol 3.6 mG (Sugar-Free) Lozenge 1 Lozenge Oral every 6 hours PRN Sore Throat  benzonatate 100 milliGRAM(s) Oral every 8 hours PRN Cough      CAPILLARY BLOOD GLUCOSE      POCT Blood Glucose.: 268 mg/dL (01 Jan 2022 07:24)  POCT Blood Glucose.: 344 mg/dL (31 Dec 2021 21:09)  POCT Blood Glucose.: 383 mg/dL (31 Dec 2021 16:39)  POCT Blood Glucose.: 293 mg/dL (31 Dec 2021 11:28)    I&O's Summary    31 Dec 2021 07:01  -  01 Jan 2022 07:00  --------------------------------------------------------  IN: 960 mL / OUT: 2320 mL / NET: -1360 mL        PHYSICAL EXAM:  Vital Signs Last 24 Hrs  T(C): 36.7 (01 Jan 2022 04:50), Max: 36.8 (31 Dec 2021 20:01)  T(F): 98.1 (01 Jan 2022 04:50), Max: 98.2 (31 Dec 2021 20:01)  HR: 80 (01 Jan 2022 04:50) (79 - 88)  BP: 125/62 (01 Jan 2022 04:50) (125/62 - 136/68)  BP(mean): --  RR: 18 (01 Jan 2022 04:50) (18 - 18)  SpO2: 92% (01 Jan 2022 04:50) (92% - 96%)    CONSTITUTIONAL: NAD, well-developed  HEENT: normal conjunctiva, PEERLA  RESPIRATORY: b/l decreased lung sounds  CARDIOVASCULAR: Regular rate and rhythm, normal S1 and S2, no murmur/rub/gallop;   ABDOMEN: No tenderness to palpation at all four quadrants, +BS  MUSCULOSKELETAL no clubbing or cyanosis of digits; no joint swelling or tenderness to palpation  EXTREMITIES No lower extremity edema; Peripheral pulses are 2+ bilaterally  SKIN: well-perfused, no dry skin, pt subjectively complained about tingling/numbness    LABS:                        7.2    3.43  )-----------( 211      ( 31 Dec 2021 07:09 )             23.0     12-31    131<L>  |  101  |  39<H>  ----------------------------<  311<H>  4.9   |  19<L>  |  2.11<H>    Ca    9.0      31 Dec 2021 06:58  Phos  2.7     12-31  Mg     2.1     12-31    TPro  6.2  /  Alb  2.7<L>  /  TBili  0.2  /  DBili  x   /  AST  91<H>  /  ALT  182<H>  /  AlkPhos  431<H>  12-31    PT/INR - ( 31 Dec 2021 07:09 )   PT: 12.1 sec;   INR: 1.01 ratio                     RADIOLOGY & ADDITIONAL TESTS:  Results Reviewed:   Imaging Personally Reviewed:  Electrocardiogram Personally Reviewed:    COORDINATION OF CARE:  Care Discussed with Consultants/Other Providers [Y/N]:  Prior or Outpatient Records Reviewed [Y/N]:

## 2022-01-01 NOTE — PROGRESS NOTE ADULT - ASSESSMENT
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 (course c/b VRE bacteremia, CNI toxicity to both tacro and cyclosporine; switched to everolimus 7/27/20), HLD, obesity, HFpEF presenting w/ fall    # elevated liver enzymes - newly elevated AST/ALT 12/28/21 and rising, unclear etiology, no abd pain, nl bili, DILI (2/2 remdesivir) possible, rejection (while holding MMF) possible, COVID induced ischemic injury possible (though lower levels than expected), will plan for liver biopsy to guide further management; CMV PCR neg 12/21  # fall - likely 2/2 pulmonary symptoms; trop negative, TTE (poor study) negative, no events on tele, no EEG, no brain MRI, plan for outpt follow up  # pulmonary symptoms - patient w/ 1+ month of upper respiratory symptoms, of cough. Presented to ED 11/19/21 with negative workup; this current presentation now found to be COVID positive w/ CT chest showing patchy groundglass opacities. DDx for symptoms include COVID (though had symptoms of cough 1 month ago) vs everolimus pneumonitis. 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 Patient was seen by pulmonary, unable to differentiate between everolimus pneumonitis and COVID. Case was reviewed with radiology - believes that CT findings more consistent with COVID (patchy ground glass infiltrates) and not everolimus pneumonitis (no fibrosis). Would recommend treating for COVID (remdesivir) and continuing current immunosuppression with plan to re-image in a short interval. If patient's respiratory symptoms worsen, will readdress role of holding everolimus and possibly higher doses of steroids for pneumonitis.     # DM - poorly controlled DM, A1c 10.1    # anemia - patient w/ acute on chronic anemia, had dark stools, though unclear if melena (green around edges) and on iron; will continue w/ supportive management, iron supplementation, transfusions prn, no plans for endoscopic evaluation at this time    Recommendations:  - f/u final liver biopsy results from 12/30 (prelim results with no rejection)  - pending liver biopsy results will guide steroid dosing and restarting cellcept   - f/u everolimus level from 12/31  - c/w everolimus 4mg q12h  - hold cellcept in the setting of ongoing COVID infection  - c/w dexamethasone 6 PO qd  - c/w bactrim and valcyte   - s/p remdesivir   - pantropazole 40mg PO BID  - no plans for endoscopic evaluation   - glycemic control (may benefit from endocrine consult; patient does not currently follow w/ endo)  - trend CMP, INR  - rest of care per primary team    Margaret Matthew MD  GI Fellow, PGY-4  Available via Microsoft Teams    NON-URGENT CONSULTS:  Please email [email or web address]  [email or web address]  AT NIGHT AND ON WEEKENDS:  Contact on-call GI fellow via answering service ([phone]) from 5pm-8am and on weekends/holidays  MONDAY-FRIDAY 8AM-5PM:  Pager# [phone] ([facility]) or [phone] ([facility])  GI Phone# [phone] ([facility])

## 2022-01-01 NOTE — PROGRESS NOTE ADULT - ASSESSMENT
65 y/o M PMHx DDLT (07/2020, c/b post-op VRE bacteremia), prior ESBL e.coli prostate abscess, R heel OM (12/2020), C. 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 Currently await rehab placement. Recent CT chest showed possible worsening of COVID PNA, currently on solumedrol.

## 2022-01-01 NOTE — PROGRESS NOTE ADULT - ATTENDING COMMENTS
SPO2 stable on RA and is awaiting on final biopsy report .  cont glucose monitoring.  Will start DC planning          Faustina Ferrera   Hospitalist   [phone]

## 2022-01-01 NOTE — PROGRESS NOTE ADULT - ATTENDING COMMENTS
65M, HTN, DM2, obesity, SHENG on noct. 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 after fall in the bathroom after he got up, felt dizzy and weak, likely due to  - COVID-19. No signif. cough here, mostly not wearing NC, good saturation; small pulm. infiltrates in lower and upper lung fields, thought more likely    COVID than everolimus pneumonitis. Had cough for a few weeks, but several negative PCRs until the one on admission, mild hypoxia sO2 95% on O2 2L/min during the day and CPAP at night for SHENG  - CKD, creat ~1.8 stable  - immunosuppression with everolimus 4 mg bid, off MMF due to infection, on prednisone 5 mg/d init., now dexamethasone for 10 days for COVID. On valcyte prophylaxis.  -    - elevated liver enzymes, now stable, likely due to COVID. S/p liver biopsy 12/30 after continued rise of ALT to 190 U/L, while off MMF and on dexamethasone for COVID.  Preliminary information by Dr. 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 Last US 12/15: coarsened hepatic echotexture, no focal lesion. CCY. Extrahepatic bile ducts not visualized. DD: COVID-related liver injury, DILI due to remdesivir or atorvastatin with irregular distribution within the liver and missed by the biopsy.       -- continue current immunosuppression and prophylaxis with valgancyclovir, bactrim and nystatin  -- f/u everolimus trough level  -- daily CMP  -- f/u final read of liver biopsy  -- can discharge with outpatient f/u, rehab planned

## 2022-01-01 NOTE — PROGRESS NOTE ADULT - SUBJECTIVE AND OBJECTIVE BOX
Chief Complaint:  Patient is a 66y old  Male who presents with a chief complaint of Fall (01 Jan 2022 07:33)      Interval Events:   Reports feeling well. Denies any N/V/D/C, abd pain, melena or hematochezia.  Prelim liver bx w/o c/f rejection.    Hospital Medications:  atorvastatin 40 milliGRAM(s) Oral at bedtime  benzocaine 15 mG/menthol 3.6 mG (Sugar-Free) Lozenge 1 Lozenge Oral every 6 hours PRN  benzonatate 100 milliGRAM(s) Oral every 8 hours PRN  dexAMETHasone     Tablet 6 milliGRAM(s) Oral daily  dextrose 40% Gel 15 Gram(s) Oral once  dextrose 5%. 1000 milliLiter(s) IV Continuous <Continuous>  dextrose 5%. 1000 milliLiter(s) IV Continuous <Continuous>  dextrose 50% Injectable 25 Gram(s) IV Push once  dextrose 50% Injectable 12.5 Gram(s) IV Push once  dextrose 50% Injectable 25 Gram(s) IV Push once  everolimus (ZORTRESS) 4 milliGRAM(s) Oral two times a day  glucagon  Injectable 1 milliGRAM(s) IntraMuscular once  heparin   Injectable 5000 Unit(s) SubCutaneous every 8 hours  insulin glargine Injectable (LANTUS) 20 Unit(s) SubCutaneous at bedtime  insulin lispro (ADMELOG) corrective regimen sliding scale   SubCutaneous three times a day before meals  insulin lispro (ADMELOG) corrective regimen sliding scale   SubCutaneous at bedtime  insulin lispro Injectable (ADMELOG) 15 Unit(s) SubCutaneous three times a day before meals  nystatin    Suspension 186821 Unit(s) Oral four times a day  pantoprazole    Tablet 40 milliGRAM(s) Oral two times a day  trimethoprim   80 mG/sulfamethoxazole 400 mG 1 Tablet(s) Oral daily  valGANciclovir 450 milliGRAM(s) Oral daily      PMHX/PSHX:  Diabetes    Hypercholesteremia    Neuropathy    Hypertension    Renal stones    Fatty liver disease, nonalcoholic    Obesity    Hepatic encephalopathy    GERD with esophagitis    GIB (gastrointestinal bleeding)    No significant past surgical history    S/P cholecystectomy            ROS: 14 point ROS negative unless otherwise stated in subjective      PHYSICAL EXAM:     GENERAL:  Well developed, no distress  HEENT:  NC/AT,  conjunctivae clear, sclera anicteric  CHEST:  Full & symmetric excursion, no increased effort w/ respirations  HEART:  Regular rhythm & rate  ABDOMEN:  Soft, non-tender, non-distended  EXTREMITIES:  no LE  edema  SKIN:  No rash/erythema/ecchymoses/petechiae/wounds/jaundice  NEURO:  Alert, orientedx3    Vital Signs:  Vital Signs Last 24 Hrs  T(C): 36.7 (01 Jan 2022 04:50), Max: 36.8 (31 Dec 2021 20:01)  T(F): 98.1 (01 Jan 2022 04:50), Max: 98.2 (31 Dec 2021 20:01)  HR: 80 (01 Jan 2022 04:50) (79 - 88)  BP: 125/62 (01 Jan 2022 04:50) (125/62 - 136/68)  BP(mean): --  RR: 18 (01 Jan 2022 04:50) (18 - 18)  SpO2: 92% (01 Jan 2022 04:50) (92% - 96%)  Daily     Daily     LABS:                        7.2    3.43  )-----------( 211      ( 31 Dec 2021 07:09 )             23.0     12-31    131<L>  |  101  |  39<H>  ----------------------------<  311<H>  4.9   |  19<L>  |  2.11<H>    Ca    9.0      31 Dec 2021 06:58  Phos  2.7     12-31  Mg     2.1     12-31    TPro  6.2  /  Alb  2.7<L>  /  TBili  0.2  /  DBili  x   /  AST  91<H>  /  ALT  182<H>  /  AlkPhos  431<H>  12-31    LIVER FUNCTIONS - ( 31 Dec 2021 06:58 )  Alb: 2.7 g/dL / Pro: 6.2 g/dL / ALK PHOS: 431 U/L / ALT: 182 U/L / AST: 91 U/L / GGT: x           PT/INR - ( 31 Dec 2021 07:09 )   PT: 12.1 sec;   INR: 1.01 ratio                 Imaging: No new abdominal imaging

## 2022-01-02 LAB
ALBUMIN SERPL ELPH-MCNC: 3 G/DL — LOW (ref 3.3–5)
ALP SERPL-CCNC: 346 U/L — HIGH (ref 40–120)
ALT FLD-CCNC: 184 U/L — HIGH (ref 10–45)
ANION GAP SERPL CALC-SCNC: 13 MMOL/L — SIGNIFICANT CHANGE UP (ref 5–17)
APTT BLD: 28.8 SEC — SIGNIFICANT CHANGE UP (ref 27.5–35.5)
AST SERPL-CCNC: 69 U/L — HIGH (ref 10–40)
BILIRUB SERPL-MCNC: 0.2 MG/DL — SIGNIFICANT CHANGE UP (ref 0.2–1.2)
BUN SERPL-MCNC: 41 MG/DL — HIGH (ref 7–23)
CALCIUM SERPL-MCNC: 8.4 MG/DL — SIGNIFICANT CHANGE UP (ref 8.4–10.5)
CHLORIDE SERPL-SCNC: 102 MMOL/L — SIGNIFICANT CHANGE UP (ref 96–108)
CO2 SERPL-SCNC: 20 MMOL/L — LOW (ref 22–31)
CREAT SERPL-MCNC: 1.91 MG/DL — HIGH (ref 0.5–1.3)
GLUCOSE BLDC GLUCOMTR-MCNC: 221 MG/DL — HIGH (ref 70–99)
GLUCOSE BLDC GLUCOMTR-MCNC: 236 MG/DL — HIGH (ref 70–99)
GLUCOSE BLDC GLUCOMTR-MCNC: 277 MG/DL — HIGH (ref 70–99)
GLUCOSE BLDC GLUCOMTR-MCNC: 317 MG/DL — HIGH (ref 70–99)
GLUCOSE SERPL-MCNC: 261 MG/DL — HIGH (ref 70–99)
HCT VFR BLD CALC: 23.1 % — LOW (ref 39–50)
HGB BLD-MCNC: 7.3 G/DL — LOW (ref 13–17)
INR BLD: 0.98 RATIO — SIGNIFICANT CHANGE UP (ref 0.88–1.16)
MAGNESIUM SERPL-MCNC: 2 MG/DL — SIGNIFICANT CHANGE UP (ref 1.6–2.6)
MCHC RBC-ENTMCNC: 26.3 PG — LOW (ref 27–34)
MCHC RBC-ENTMCNC: 31.6 GM/DL — LOW (ref 32–36)
MCV RBC AUTO: 83.1 FL — SIGNIFICANT CHANGE UP (ref 80–100)
NRBC # BLD: 0 /100 WBCS — SIGNIFICANT CHANGE UP (ref 0–0)
PHOSPHATE SERPL-MCNC: 2.6 MG/DL — SIGNIFICANT CHANGE UP (ref 2.5–4.5)
PLATELET # BLD AUTO: 190 K/UL — SIGNIFICANT CHANGE UP (ref 150–400)
POTASSIUM SERPL-MCNC: 4.3 MMOL/L — SIGNIFICANT CHANGE UP (ref 3.5–5.3)
POTASSIUM SERPL-SCNC: 4.3 MMOL/L — SIGNIFICANT CHANGE UP (ref 3.5–5.3)
PROT SERPL-MCNC: 6 G/DL — SIGNIFICANT CHANGE UP (ref 6–8.3)
PROTHROM AB SERPL-ACNC: 11.8 SEC — SIGNIFICANT CHANGE UP (ref 10.6–13.6)
RBC # BLD: 2.78 M/UL — LOW (ref 4.2–5.8)
RBC # FLD: 15.3 % — HIGH (ref 10.3–14.5)
SODIUM SERPL-SCNC: 135 MMOL/L — SIGNIFICANT CHANGE UP (ref 135–145)
WBC # BLD: 2.77 K/UL — LOW (ref 3.8–10.5)
WBC # FLD AUTO: 2.77 K/UL — LOW (ref 3.8–10.5)

## 2022-01-02 PROCEDURE — 99233 SBSQ HOSP IP/OBS HIGH 50: CPT | Mod: GC

## 2022-01-02 RX ORDER — INSULIN LISPRO 100/ML
23 VIAL (ML) SUBCUTANEOUS
Refills: 0 | Status: DISCONTINUED | OUTPATIENT
Start: 2022-01-02 | End: 2022-01-03

## 2022-01-02 RX ORDER — INSULIN GLARGINE 100 [IU]/ML
32 INJECTION, SOLUTION SUBCUTANEOUS AT BEDTIME
Refills: 0 | Status: DISCONTINUED | OUTPATIENT
Start: 2022-01-02 | End: 2022-01-03

## 2022-01-02 RX ADMIN — Medication 4: at 17:18

## 2022-01-02 RX ADMIN — Medication 2: at 21:22

## 2022-01-02 RX ADMIN — VALGANCICLOVIR 450 MILLIGRAM(S): 450 TABLET, FILM COATED ORAL at 11:37

## 2022-01-02 RX ADMIN — EVEROLIMUS 4 MILLIGRAM(S): 10 TABLET ORAL at 09:50

## 2022-01-02 RX ADMIN — Medication 500000 UNIT(S): at 17:18

## 2022-01-02 RX ADMIN — PANTOPRAZOLE SODIUM 40 MILLIGRAM(S): 20 TABLET, DELAYED RELEASE ORAL at 05:40

## 2022-01-02 RX ADMIN — HEPARIN SODIUM 5000 UNIT(S): 5000 INJECTION INTRAVENOUS; SUBCUTANEOUS at 05:39

## 2022-01-02 RX ADMIN — INSULIN GLARGINE 32 UNIT(S): 100 INJECTION, SOLUTION SUBCUTANEOUS at 21:24

## 2022-01-02 RX ADMIN — Medication 1 TABLET(S): at 11:37

## 2022-01-02 RX ADMIN — EVEROLIMUS 4 MILLIGRAM(S): 10 TABLET ORAL at 20:59

## 2022-01-02 RX ADMIN — ATORVASTATIN CALCIUM 40 MILLIGRAM(S): 80 TABLET, FILM COATED ORAL at 21:00

## 2022-01-02 RX ADMIN — HEPARIN SODIUM 5000 UNIT(S): 5000 INJECTION INTRAVENOUS; SUBCUTANEOUS at 15:39

## 2022-01-02 RX ADMIN — Medication 23 UNIT(S): at 17:18

## 2022-01-02 RX ADMIN — HEPARIN SODIUM 5000 UNIT(S): 5000 INJECTION INTRAVENOUS; SUBCUTANEOUS at 21:00

## 2022-01-02 RX ADMIN — Medication 4: at 11:36

## 2022-01-02 RX ADMIN — Medication 500000 UNIT(S): at 05:40

## 2022-01-02 RX ADMIN — Medication 500000 UNIT(S): at 11:37

## 2022-01-02 RX ADMIN — Medication 6 MILLIGRAM(S): at 05:40

## 2022-01-02 RX ADMIN — Medication 20 UNIT(S): at 11:37

## 2022-01-02 RX ADMIN — PANTOPRAZOLE SODIUM 40 MILLIGRAM(S): 20 TABLET, DELAYED RELEASE ORAL at 17:17

## 2022-01-02 RX ADMIN — Medication 6: at 08:20

## 2022-01-02 RX ADMIN — Medication 20 UNIT(S): at 08:21

## 2022-01-02 NOTE — PROGRESS NOTE ADULT - PROBLEM SELECTOR PLAN 7
- Transplant hepatology recs appreciated  - Transplant surgery recs appreciated  - Transplant ID recs appreciated  - prednisone 5  - everolimus  - cellcept 500 q12 held per recs  - bactrim, valcyte for prophylaxis - Transplant hepatology recs appreciated  - Transplant surgery recs appreciated  - Transplant ID recs appreciated  - c/w everolimus 4mg q12h  - hold cellcept in the setting of ongoing COVID infection  - c/w dexamethasone 6 PO qd  - c/w bactrim and valcyte

## 2022-01-02 NOTE — PROGRESS NOTE ADULT - PROBLEM SELECTOR PLAN 4
Chemo as planned after labs  RTC 2 weeks Urinalysis consistent with UTI.  - Urine culture <10,000 normal urogenital ninfa

## 2022-01-02 NOTE — PROGRESS NOTE ADULT - PROBLEM SELECTOR PLAN 2
Cough and sore throat for the past few days. RVP positive for covid at this visit  - Currently in PICU (Covid unit)  - satting well on room air. Desats at night 2/2 SHENG. Refused cpap  - cough drops PRN  - s/p remdesivir (12/20 - 12/25 )  - s/p monoclonal antibody infusion (12/20)  - CTM  - c/w dexamethasone 10d (12/29-1/7)  - Pt current having increasing O2 requirement, will check O2 requirement with walking Cough and sore throat for the past few days. RVP positive for covid at this visit  - Currently in PICU (Covid unit)  - satting well on room air. Desats at night 2/2 SHENG. Refused cpap  - cough drops PRN  - s/p remdesivir (12/20 - 12/25 )  - s/p monoclonal antibody infusion (12/20)  - CTM  - c/w dexamethasone 10d (12/29-1/7)

## 2022-01-02 NOTE — PROGRESS NOTE ADULT - SUBJECTIVE AND OBJECTIVE BOX
PROGRESS NOTE:   Authored by Johnathan Ordonez MD  Pager: [facility] [phone]; [facility] [phone]    Patient is a 66y old  Male who presents with a chief complaint of Fall (01 Jan 2022 09:49)      SUBJECTIVE / OVERNIGHT EVENTS:    ADDITIONAL REVIEW OF SYSTEMS:    MEDICATIONS  (STANDING):  atorvastatin 40 milliGRAM(s) Oral at bedtime  dexAMETHasone     Tablet 6 milliGRAM(s) Oral daily  dextrose 40% Gel 15 Gram(s) Oral once  dextrose 5%. 1000 milliLiter(s) (50 mL/Hr) IV Continuous <Continuous>  dextrose 5%. 1000 milliLiter(s) (100 mL/Hr) IV Continuous <Continuous>  dextrose 50% Injectable 25 Gram(s) IV Push once  dextrose 50% Injectable 12.5 Gram(s) IV Push once  dextrose 50% Injectable 25 Gram(s) IV Push once  everolimus (ZORTRESS) 4 milliGRAM(s) Oral two times a day  glucagon  Injectable 1 milliGRAM(s) IntraMuscular once  heparin   Injectable 5000 Unit(s) SubCutaneous every 8 hours  insulin glargine Injectable (LANTUS) 28 Unit(s) SubCutaneous at bedtime  insulin lispro (ADMELOG) corrective regimen sliding scale   SubCutaneous at bedtime  insulin lispro (ADMELOG) corrective regimen sliding scale   SubCutaneous three times a day before meals  insulin lispro Injectable (ADMELOG) 20 Unit(s) SubCutaneous three times a day before meals  nystatin    Suspension 668714 Unit(s) Oral four times a day  pantoprazole    Tablet 40 milliGRAM(s) Oral two times a day  trimethoprim   80 mG/sulfamethoxazole 400 mG 1 Tablet(s) Oral daily  valGANciclovir 450 milliGRAM(s) Oral daily    MEDICATIONS  (PRN):  benzocaine 15 mG/menthol 3.6 mG (Sugar-Free) Lozenge 1 Lozenge Oral every 6 hours PRN Sore Throat  benzonatate 100 milliGRAM(s) Oral every 8 hours PRN Cough      CAPILLARY BLOOD GLUCOSE      POCT Blood Glucose.: 352 mg/dL (01 Jan 2022 21:15)  POCT Blood Glucose.: 248 mg/dL (01 Jan 2022 16:15)  POCT Blood Glucose.: 277 mg/dL (01 Jan 2022 11:17)    I&O's Summary    01 Jan 2022 07:01  -  02 Jan 2022 07:00  --------------------------------------------------------  IN: 480 mL / OUT: 1850 mL / NET: -1370 mL        PHYSICAL EXAM:  Vital Signs Last 24 Hrs  T(C): 36.7 (02 Jan 2022 04:26), Max: 36.9 (01 Jan 2022 11:36)  T(F): 98 (02 Jan 2022 04:26), Max: 98.4 (01 Jan 2022 11:36)  HR: 73 (02 Jan 2022 04:26) (73 - 90)  BP: 118/63 (02 Jan 2022 04:26) (118/63 - 124/72)  BP(mean): --  RR: 18 (02 Jan 2022 04:26) (18 - 18)  SpO2: 91% (02 Jan 2022 04:26) (91% - 96%)    GENERAL: No acute distress, well-developed  HEAD:  Atraumatic, Normocephalic  EYES: EOMI, PERRLA, conjunctiva and sclera clear  NECK: Supple, no lymphadenopathy, no JVD  CHEST/LUNG: CTAB; No wheezes, rales, or rhonchi  HEART: Regular rate and rhythm; No murmurs, rubs, or gallops  ABDOMEN: Soft, non-tender, non-distended; normal bowel sounds, no organomegaly  EXTREMITIES:  2+ peripheral pulses b/l, No clubbing, cyanosis, or edema  NEUROLOGY: A&O x 3, no focal deficits  SKIN: No rashes or lesions    LABS:                        7.3    2.77  )-----------( 190      ( 02 Jan 2022 04:57 )             23.1     01-02    135  |  102  |  41<H>  ----------------------------<  261<H>  4.3   |  20<L>  |  1.91<H>    Ca    8.4      02 Jan 2022 04:57  Phos  2.6     01-02  Mg     2.0     01-02    TPro  6.0  /  Alb  3.0<L>  /  TBili  0.2  /  DBili  x   /  AST  69<H>  /  ALT  184<H>  /  AlkPhos  346<H>  01-02    PT/INR - ( 02 Jan 2022 04:57 )   PT: 11.8 sec;   INR: 0.98 ratio         PTT - ( 02 Jan 2022 04:57 )  PTT:28.8 sec            RADIOLOGY & ADDITIONAL TESTS:  Results Reviewed:   Imaging Personally Reviewed:  Electrocardiogram Personally Reviewed:    COORDINATION OF CARE:  Care Discussed with Consultants/Other Providers [Y/N]:  Prior or Outpatient Records Reviewed [Y/N]:   PROGRESS NOTE:   Authored by Johnathan Ordonez MD  Pager: [facility] [phone]; [facility] [phone]    Patient is a 66y old  Male who presents with a chief complaint of Fall (01 Jan 2022 09:49)      SUBJECTIVE / OVERNIGHT EVENTS:  No acute events overnight. Feeling better and denies any abdominal pain or N/V.     ADDITIONAL REVIEW OF SYSTEMS:    MEDICATIONS  (STANDING):  atorvastatin 40 milliGRAM(s) Oral at bedtime  dexAMETHasone     Tablet 6 milliGRAM(s) Oral daily  dextrose 40% Gel 15 Gram(s) Oral once  dextrose 5%. 1000 milliLiter(s) (50 mL/Hr) IV Continuous <Continuous>  dextrose 5%. 1000 milliLiter(s) (100 mL/Hr) IV Continuous <Continuous>  dextrose 50% Injectable 25 Gram(s) IV Push once  dextrose 50% Injectable 12.5 Gram(s) IV Push once  dextrose 50% Injectable 25 Gram(s) IV Push once  everolimus (ZORTRESS) 4 milliGRAM(s) Oral two times a day  glucagon  Injectable 1 milliGRAM(s) IntraMuscular once  heparin   Injectable 5000 Unit(s) SubCutaneous every 8 hours  insulin glargine Injectable (LANTUS) 28 Unit(s) SubCutaneous at bedtime  insulin lispro (ADMELOG) corrective regimen sliding scale   SubCutaneous at bedtime  insulin lispro (ADMELOG) corrective regimen sliding scale   SubCutaneous three times a day before meals  insulin lispro Injectable (ADMELOG) 20 Unit(s) SubCutaneous three times a day before meals  nystatin    Suspension [id number] Unit(s) Oral four times a day  pantoprazole    Tablet 40 milliGRAM(s) Oral two times a day  trimethoprim   80 mG/sulfamethoxazole 400 mG 1 Tablet(s) Oral daily  valGANciclovir 450 milliGRAM(s) Oral daily    MEDICATIONS  (PRN):  benzocaine 15 mG/menthol 3.6 mG (Sugar-Free) Lozenge 1 Lozenge Oral every 6 hours PRN Sore Throat  benzonatate 100 milliGRAM(s) Oral every 8 hours PRN Cough      CAPILLARY BLOOD GLUCOSE      POCT Blood Glucose.: 352 mg/dL (01 Jan 2022 21:15)  POCT Blood Glucose.: 248 mg/dL (01 Jan 2022 16:15)  POCT Blood Glucose.: 277 mg/dL (01 Jan 2022 11:17)    I&O's Summary    01 Jan 2022 07:01  -  02 Jan 2022 07:00  --------------------------------------------------------  IN: 480 mL / OUT: 1850 mL / NET: -1370 mL        PHYSICAL EXAM:  Vital Signs Last 24 Hrs  T(C): 36.7 (02 Jan 2022 04:26), Max: 36.9 (01 Jan 2022 11:36)  T(F): 98 (02 Jan 2022 04:26), Max: 98.4 (01 Jan 2022 11:36)  HR: 73 (02 Jan 2022 04:26) (73 - 90)  BP: 118/63 (02 Jan 2022 04:26) (118/63 - 124/72)  BP(mean): --  RR: 18 (02 Jan 2022 04:26) (18 - 18)  SpO2: 91% (02 Jan 2022 04:26) (91% - 96%)    CONSTITUTIONAL: NAD, well-developed  HEENT: normal conjunctiva, PEERLA  RESPIRATORY: b/l decreased lung sounds  CARDIOVASCULAR: Regular rate and rhythm, normal S1 and S2, no murmur/rub/gallop;   ABDOMEN: No tenderness to palpation at all four quadrants, +BS  MUSCULOSKELETAL no clubbing or cyanosis of digits; no joint swelling or tenderness to palpation  EXTREMITIES No lower extremity edema; Peripheral pulses are 2+ bilaterally  SKIN: well-perfused, no dry skin, pt subjectively complained about tingling/numbness      LABS:                        7.3    2.77  )-----------( 190      ( 02 Jan 2022 04:57 )             23.1     01-02    135  |  102  |  41<H>  ----------------------------<  261<H>  4.3   |  20<L>  |  1.91<H>    Ca    8.4      02 Jan 2022 04:57  Phos  2.6     01-02  Mg     2.0     01-02    TPro  6.0  /  Alb  3.0<L>  /  TBili  0.2  /  DBili  x   /  AST  69<H>  /  ALT  184<H>  /  AlkPhos  346<H>  01-02    PT/INR - ( 02 Jan 2022 04:57 )   PT: 11.8 sec;   INR: 0.98 ratio         PTT - ( 02 Jan 2022 04:57 )  PTT:28.8 sec            RADIOLOGY & ADDITIONAL TESTS:  Results Reviewed:   Imaging Personally Reviewed:  Electrocardiogram Personally Reviewed:    COORDINATION OF CARE:  Care Discussed with Consultants/Other Providers [Y/N]:  Prior or Outpatient Records Reviewed [Y/N]:

## 2022-01-02 NOTE — PROGRESS NOTE ADULT - PROBLEM SELECTOR PLAN 6
Mildly elevated admission AST/ALT at 45/66 respectively. Recent ultrasound showed good graft flow.   - Transplant hepatology recs appreciated  - RUQ US showed coarsened hepatic echotexture   - Liver biopsy (12/30), path pending   - Will cont to monitor LFT's Mildly elevated admission AST/ALT at 45/66 respectively. Recent ultrasound showed good graft flow.   - Transplant hepatology recs appreciated  - RUQ US showed coarsened hepatic echotexture   - Liver biopsy (12/30), preliminary path shows no rejection   - Will cont to monitor LFT's

## 2022-01-02 NOTE — PROGRESS NOTE ADULT - PROBLEM SELECTOR PLAN 5
H/H slowly downtrending. Per nursing, patient had "dark brown" stools. Unfortunately sample was not saved so could not assess. Previous endoscopy was done roughly 1 year ago for melena and was shown to have varices and bleeding. Stable. No additional melena  - Blood consent in chart  - Active type and screen  - trend CBC  - pRBC PRN when hgb less than 7  - Gi prophlyaxis: protonix 40 q12   - FOBT positive  - Hep not interested in scoping currently H/H slowly downtrending. Per nursing, patient had "dark brown" stools. Unfortunately sample was not saved so could not assess. Previous endoscopy was done roughly 1 year ago for melena and was shown to have varices and bleeding. Stable. No additional melena  - Blood consent in chart  - Active type and screen  - trend CBC  - pRBC PRN when hgb less than 7  - Gi prophlyaxis: protonix 40 q12   - FOBT positive  - Hep not planning in scoping currently

## 2022-01-02 NOTE — PROGRESS NOTE ADULT - PROBLEM SELECTOR PLAN 3
CT chest concerning for possible beginnings of multifocal pneumonia. May be the beginnings of covid pneumonia vs everolimus side effect  - See above (coronavirus)  - Pulm consult: are lung findings secondary to covid or everolimus side effect? Unable to be determined by imaging  - Empiric antibiotics for five days (ceftriaxone 12/21 - 12/25) per ID  - CT chest showed possibly worsening COVID PNA CT chest concerning for possible beginnings of multifocal pneumonia. May be the beginnings of covid pneumonia vs everolimus side effect  - See above (coronavirus)  - Pulm consult: are lung findings secondary to covid or everolimus side effect? Unable to be determined by imaging  - S/P Empiric antibiotics for five days (ceftriaxone 12/21 - 12/25) per ID  - CT chest showed possibly worsening COVID PNA

## 2022-01-03 DIAGNOSIS — E11.65 TYPE 2 DIABETES MELLITUS WITH HYPERGLYCEMIA: ICD-10-CM

## 2022-01-03 DIAGNOSIS — R73.9 HYPERGLYCEMIA, UNSPECIFIED: ICD-10-CM

## 2022-01-03 LAB
ALBUMIN SERPL ELPH-MCNC: 3 G/DL — LOW (ref 3.3–5)
ALP SERPL-CCNC: 359 U/L — HIGH (ref 40–120)
ALT FLD-CCNC: 180 U/L — HIGH (ref 10–45)
ANION GAP SERPL CALC-SCNC: 14 MMOL/L — SIGNIFICANT CHANGE UP (ref 5–17)
APTT BLD: 35.8 SEC — HIGH (ref 27.5–35.5)
AST SERPL-CCNC: 56 U/L — HIGH (ref 10–40)
BILIRUB SERPL-MCNC: 0.1 MG/DL — LOW (ref 0.2–1.2)
BLD GP AB SCN SERPL QL: NEGATIVE — SIGNIFICANT CHANGE UP
BUN SERPL-MCNC: 46 MG/DL — HIGH (ref 7–23)
CALCIUM SERPL-MCNC: 8.5 MG/DL — SIGNIFICANT CHANGE UP (ref 8.4–10.5)
CHLORIDE SERPL-SCNC: 101 MMOL/L — SIGNIFICANT CHANGE UP (ref 96–108)
CO2 SERPL-SCNC: 19 MMOL/L — LOW (ref 22–31)
CREAT SERPL-MCNC: 1.87 MG/DL — HIGH (ref 0.5–1.3)
EVEROLIMUS, WHOLE BLOOD RESULT: 17 NG/ML — HIGH (ref 3–8)
GLUCOSE BLDC GLUCOMTR-MCNC: 505 MG/DL — CRITICAL HIGH (ref 70–99)
GLUCOSE BLDC GLUCOMTR-MCNC: 514 MG/DL — CRITICAL HIGH (ref 70–99)
GLUCOSE SERPL-MCNC: 556 MG/DL — CRITICAL HIGH (ref 70–99)
HCT VFR BLD CALC: 23.7 % — LOW (ref 39–50)
HGB BLD-MCNC: 7.1 G/DL — LOW (ref 13–17)
INR BLD: 0.95 RATIO — SIGNIFICANT CHANGE UP (ref 0.88–1.16)
MAGNESIUM SERPL-MCNC: 1.9 MG/DL — SIGNIFICANT CHANGE UP (ref 1.6–2.6)
MCHC RBC-ENTMCNC: 26.3 PG — LOW (ref 27–34)
MCHC RBC-ENTMCNC: 30 GM/DL — LOW (ref 32–36)
MCV RBC AUTO: 87.8 FL — SIGNIFICANT CHANGE UP (ref 80–100)
NRBC # BLD: 0 /100 WBCS — SIGNIFICANT CHANGE UP (ref 0–0)
PHOSPHATE SERPL-MCNC: 2.2 MG/DL — LOW (ref 2.5–4.5)
PLATELET # BLD AUTO: 201 K/UL — SIGNIFICANT CHANGE UP (ref 150–400)
POTASSIUM SERPL-MCNC: 4.6 MMOL/L — SIGNIFICANT CHANGE UP (ref 3.5–5.3)
POTASSIUM SERPL-SCNC: 4.6 MMOL/L — SIGNIFICANT CHANGE UP (ref 3.5–5.3)
PROT SERPL-MCNC: 5.7 G/DL — LOW (ref 6–8.3)
PROTHROM AB SERPL-ACNC: 11.4 SEC — SIGNIFICANT CHANGE UP (ref 10.6–13.6)
RBC # BLD: 2.7 M/UL — LOW (ref 4.2–5.8)
RBC # FLD: 15.5 % — HIGH (ref 10.3–14.5)
RH IG SCN BLD-IMP: POSITIVE — SIGNIFICANT CHANGE UP
SODIUM SERPL-SCNC: 134 MMOL/L — LOW (ref 135–145)
WBC # BLD: 2.42 K/UL — LOW (ref 3.8–10.5)
WBC # FLD AUTO: 2.42 K/UL — LOW (ref 3.8–10.5)

## 2022-01-03 PROCEDURE — 99233 SBSQ HOSP IP/OBS HIGH 50: CPT | Mod: GC

## 2022-01-03 PROCEDURE — 99232 SBSQ HOSP IP/OBS MODERATE 35: CPT | Mod: GC

## 2022-01-03 PROCEDURE — 99222 1ST HOSP IP/OBS MODERATE 55: CPT

## 2022-01-03 RX ORDER — ATOVAQUONE 750 MG/5ML
1500 SUSPENSION ORAL DAILY
Refills: 0 | Status: DISCONTINUED | OUTPATIENT
Start: 2022-01-04 | End: 2022-01-06

## 2022-01-03 RX ORDER — INSULIN GLARGINE 100 [IU]/ML
42 INJECTION, SOLUTION SUBCUTANEOUS AT BEDTIME
Refills: 0 | Status: DISCONTINUED | OUTPATIENT
Start: 2022-01-03 | End: 2022-01-03

## 2022-01-03 RX ORDER — INSULIN GLARGINE 100 [IU]/ML
46 INJECTION, SOLUTION SUBCUTANEOUS AT BEDTIME
Refills: 0 | Status: DISCONTINUED | OUTPATIENT
Start: 2022-01-03 | End: 2022-01-06

## 2022-01-03 RX ORDER — INSULIN LISPRO 100/ML
27 VIAL (ML) SUBCUTANEOUS
Refills: 0 | Status: DISCONTINUED | OUTPATIENT
Start: 2022-01-03 | End: 2022-01-05

## 2022-01-03 RX ADMIN — PANTOPRAZOLE SODIUM 40 MILLIGRAM(S): 20 TABLET, DELAYED RELEASE ORAL at 17:27

## 2022-01-03 RX ADMIN — Medication 6 MILLIGRAM(S): at 05:13

## 2022-01-03 RX ADMIN — HEPARIN SODIUM 5000 UNIT(S): 5000 INJECTION INTRAVENOUS; SUBCUTANEOUS at 12:19

## 2022-01-03 RX ADMIN — ATORVASTATIN CALCIUM 40 MILLIGRAM(S): 80 TABLET, FILM COATED ORAL at 21:38

## 2022-01-03 RX ADMIN — Medication 500000 UNIT(S): at 12:19

## 2022-01-03 RX ADMIN — Medication 500000 UNIT(S): at 00:12

## 2022-01-03 RX ADMIN — Medication 500000 UNIT(S): at 17:27

## 2022-01-03 RX ADMIN — HEPARIN SODIUM 5000 UNIT(S): 5000 INJECTION INTRAVENOUS; SUBCUTANEOUS at 05:13

## 2022-01-03 RX ADMIN — Medication 6: at 17:26

## 2022-01-03 RX ADMIN — INSULIN GLARGINE 46 UNIT(S): 100 INJECTION, SOLUTION SUBCUTANEOUS at 21:37

## 2022-01-03 RX ADMIN — Medication 27 UNIT(S): at 12:19

## 2022-01-03 RX ADMIN — Medication 4: at 12:20

## 2022-01-03 RX ADMIN — PANTOPRAZOLE SODIUM 40 MILLIGRAM(S): 20 TABLET, DELAYED RELEASE ORAL at 05:14

## 2022-01-03 RX ADMIN — Medication 500000 UNIT(S): at 05:14

## 2022-01-03 RX ADMIN — HEPARIN SODIUM 5000 UNIT(S): 5000 INJECTION INTRAVENOUS; SUBCUTANEOUS at 21:37

## 2022-01-03 RX ADMIN — Medication 12: at 07:53

## 2022-01-03 RX ADMIN — EVEROLIMUS 4 MILLIGRAM(S): 10 TABLET ORAL at 17:27

## 2022-01-03 RX ADMIN — VALGANCICLOVIR 450 MILLIGRAM(S): 450 TABLET, FILM COATED ORAL at 12:18

## 2022-01-03 RX ADMIN — Medication 1 TABLET(S): at 12:19

## 2022-01-03 RX ADMIN — EVEROLIMUS 4 MILLIGRAM(S): 10 TABLET ORAL at 05:13

## 2022-01-03 RX ADMIN — Medication 23 UNIT(S): at 07:53

## 2022-01-03 RX ADMIN — Medication 27 UNIT(S): at 17:25

## 2022-01-03 NOTE — CONSULT NOTE ADULT - CONSULT REASON
bilateral opacities
COVID19
T2DM with hyperglycemia
s/p liver transplant
liver biopsy
unwitnessed fall
syncope

## 2022-01-03 NOTE — CONSULT NOTE ADULT - PROVIDER SPECIALTY LIST ADULT
Transplant Surgery
Pulmonology
Endocrinology
Neurology
Transplant ID
Intervent Radiology
Transplant Hepatology

## 2022-01-03 NOTE — PROGRESS NOTE ADULT - PROBLEM SELECTOR PLAN 2
Cough and sore throat for the past few days. RVP positive for covid at this visit  - Currently in PICU (Covid unit)  - satting well on room air. Desats at night 2/2 SHENG. Refused cpap  - cough drops PRN  - s/p remdesivir (12/20 - 12/25 )  - s/p monoclonal antibody infusion (12/20)  - CTM  - c/w dexamethasone 10d (12/29-1/7)

## 2022-01-03 NOTE — PROGRESS NOTE ADULT - ATTENDING COMMENTS
Glucose is poorly controlled , cont to uptitrate Lantus and admelog and ENdo consults .  NO evidence of DKA       [facility]

## 2022-01-03 NOTE — PROGRESS NOTE ADULT - PROBLEM SELECTOR PLAN 11
Home insulin regimen 12 lantus and 10 humalog premeal. A1c 10.1.  -now on lantus 4u and premeal 2u TID, caution in s/o hypoglycemia  - Mod SSI  - CTM Home insulin regimen 12 lantus and 10 humalog premeal. A1c 10.1.  -now on increasing insulin requirements due to steroid induced hyperglycemia  - Endocrine consulted

## 2022-01-03 NOTE — CONSULT NOTE ADULT - ASSESSMENT
65 y/o man with T2DM on insulin uncontrolled HbA1C 10.1 c/b HFpEF, neuropathy, DFU, retinopathy. Also with hx of liver transplant in 2020 for JEAN cirrhosis on prednisone 5 mg daily, MGUS, presented s/p fall, awaiting rehab placement. Found to be COVID positive, on steroids. Endocrine consulted for T2DM with hyperglycemia.    #T2DM with marked hyperglycemia with significantly higher insulin requirements compared to home, likely due to COVID infection and steroids. On dexamethasone 6mg daily.  - goal glucose 100-180  - recommend Lantus 46 units qhs  - agree with Admelog 27 units TID pre-meal  - moderate pre-meal correction scale and change bedtime scale to moderate HS correction scale  - check FS AC/HS  - carb consistent diet  - registered dietician eval  Discharge  - Plan TBD    #steroid induced hyperglycemia  on dexamethasone 6 mg daily since 12/28, planned to stop 1/7  - please notify endocrine if any changes to steroid regimen    #HLD  - continue statin if no contraindication       Nicolette Rojas DO, Endocrine Fellow   Pager [phone] from 9-5PM. After hours and on weekends please call [phone].

## 2022-01-03 NOTE — PROGRESS NOTE ADULT - PROBLEM SELECTOR PLAN 3
CT chest concerning for possible beginnings of multifocal pneumonia. May be the beginnings of covid pneumonia vs everolimus side effect  - See above (coronavirus)  - Pulm consult: are lung findings secondary to covid or everolimus side effect? Unable to be determined by imaging  - S/P Empiric antibiotics for five days (ceftriaxone 12/21 - 12/25) per ID  - CT chest showed possibly worsening COVID PNA

## 2022-01-03 NOTE — PROGRESS NOTE ADULT - PROBLEM SELECTOR PLAN 7
- Transplant hepatology recs appreciated  - Transplant surgery recs appreciated  - Transplant ID recs appreciated  - c/w everolimus 4mg q12h  - hold cellcept in the setting of ongoing COVID infection  - c/w dexamethasone 6 PO qd  - c/w bactrim and valcyte

## 2022-01-03 NOTE — CONSULT NOTE ADULT - CONSULT REQUESTED DATE/TIME
20-Dec-2021 00:17
30-Dec-2021 09:53
20-Dec-2021 12:42
20-Dec-2021 16:30
20-Dec-2021 00:29
20-Dec-2021 13:04
03-Jan-2022 18:57

## 2022-01-03 NOTE — CONSULT NOTE ADULT - SUBJECTIVE AND OBJECTIVE BOX
HPI:  65 y/o man with T2DM on insulin uncontrolled HbA1C 10.1 c/b HFpEF, neuropathy, DFU, retinopathy. Also with hx of liver transplant in 2020 for JEAN cirrhosis on prednisone 5 mg daily, MGUS, presented s/p fall, awaiting rehab placement. Found to be COVID positive, on steroids. Endocrine consulted for T2DM with hyperglycemia.    Diagnosed with T2DM ~ 16-27 years ago. Complications as noted above. Follows with Endocrinologist, Dr. Mathur. Takes Lantus 12 units qhs, humalog 12 units TID pre-meal with low pre-meal correction scale. Checks FS 3-4 x/day and levels range 160-170 usually with rare hypoglycemia. Watches diet and moderates carbs.   +PO intake.      PAST MEDICAL & SURGICAL HISTORY:  Diabetes    Hypercholesteremia    Neuropathy    Hypertension    Renal stones  25 years ago    Fatty liver disease, nonalcoholic    Obesity    Hepatic encephalopathy    GERD with esophagitis  Gastritis &amp; Non Bleeding Ulcers    GIB (gastrointestinal bleeding)    S/P cholecystectomy        FAMILY HISTORY:  Family history of stomach cancer    Family history of hypertension    Family history of type 2 diabetes mellitus in father        Social History:  Denies tobacco use   Denies ETOH use        Outpatient Medications: Home Medications:  aspirin 81 mg oral delayed release tablet: 1 tab(s) orally once a day (20 Dec 2021 13:25)  CellCept 500 mg oral tablet: 1 tab(s) orally 2 times a day (20 Dec 2021 13:25)  everolimus 1 mg oral tablet: 4 tab(s) orally 2 times a day (20 Dec 2021 13:25)  folic acid 1 mg oral tablet: 1 tab(s) orally once a day (20 Dec 2021 13:25)  HumaLOG KwikPen 100 units/mL injectable solution: 10 unit(s) injectable 3 times a day (before meals) (20 Dec 2021 13:25)  Lantus 100 units/mL subcutaneous solution: 12 unit(s) subcutaneous once a day (at bedtime) (20 Dec 2021 13:25)  Lovaza 1000 mg oral capsule: 2 cap(s) orally 2 times a day (20 Dec 2021 13:25)  magnesium oxide 400 mg (241.3 mg elemental magnesium) oral tablet: 1 tab(s) orally once a day (before a meal) (20 Dec 2021 09:55)  predniSONE 5 mg oral tablet: 1 tab(s) orally once a day (20 Dec 2021 13:25)  Protonix 40 mg oral delayed release tablet: 1 tab(s) orally once a day (20 Dec 2021 13:25)  rosuvastatin 20 mg oral tablet: 1 tab(s) orally once a day (20 Dec 2021 13:25)  Zetia 10 mg oral tablet: 1 tab(s) orally once a day (20 Dec 2021 13:25)  Zoloft 100 mg oral tablet: 1 tab(s) orally once a day (20 Dec 2021 13:25)      MEDICATIONS  (STANDING):  atorvastatin 40 milliGRAM(s) Oral at bedtime  dexAMETHasone     Tablet 6 milliGRAM(s) Oral daily  dextrose 40% Gel 15 Gram(s) Oral once  dextrose 5%. 1000 milliLiter(s) (50 mL/Hr) IV Continuous <Continuous>  dextrose 5%. 1000 milliLiter(s) (100 mL/Hr) IV Continuous <Continuous>  dextrose 50% Injectable 25 Gram(s) IV Push once  dextrose 50% Injectable 12.5 Gram(s) IV Push once  dextrose 50% Injectable 25 Gram(s) IV Push once  everolimus (ZORTRESS) 4 milliGRAM(s) Oral two times a day  glucagon  Injectable 1 milliGRAM(s) IntraMuscular once  heparin   Injectable 5000 Unit(s) SubCutaneous every 8 hours  insulin glargine Injectable (LANTUS) 46 Unit(s) SubCutaneous at bedtime  insulin lispro (ADMELOG) corrective regimen sliding scale   SubCutaneous three times a day before meals  insulin lispro (ADMELOG) corrective regimen sliding scale   SubCutaneous at bedtime  insulin lispro Injectable (ADMELOG) 27 Unit(s) SubCutaneous three times a day before meals  nystatin    Suspension 930573 Unit(s) Oral four times a day  pantoprazole    Tablet 40 milliGRAM(s) Oral two times a day  valGANciclovir 450 milliGRAM(s) Oral daily    MEDICATIONS  (PRN):  benzocaine 15 mG/menthol 3.6 mG (Sugar-Free) Lozenge 1 Lozenge Oral every 6 hours PRN Sore Throat  benzonatate 100 milliGRAM(s) Oral every 8 hours PRN Cough      Allergies    codeine (Anaphylaxis)    Intolerances      Review of Systems:  Constitutional: No fever, chills   Neuro: No tremors, headache   Cardiovascular: No chest pain, palpitations  Respiratory: No SOB, no cough  GI: No nausea, vomiting, abdominal pain  : No dysuria, polyuria   Psych: no depression, anxiety   Endocrine: no polyphagia, polydipsia     ALL OTHER SYSTEMS REVIEWED AND NEGATIVE        PHYSICAL EXAM:  VITALS: T(C): 36.7 (01-03-22 @ 12:23)  T(F): 98.1 (01-03-22 @ 12:23), Max: 98.1 (01-03-22 @ 12:23)  HR: 90 (01-03-22 @ 12:23) (88 - 95)  BP: 116/69 (01-03-22 @ 12:23) (116/69 - 121/68)  RR:  (18 - 18)  SpO2:  (91% - 94%)  Wt(kg): --  GENERAL: NAD, well-groomed, well-developed  EYES: No proptosis, anicteric  HEENT:  Atraumatic, Normocephalic, moist mucous membranes  RESPIRATORY: no acute respiratory distress, no accessory muscle use   NEURO: AOx3, moves all extremities spontaneously   PSYCH: Reactive affect, euthymic mood    POCT Blood Glucose.: 252 mg/dL (01-03-22 @ 16:28)  POCT Blood Glucose.: 238 mg/dL (01-03-22 @ 11:53)  POCT Blood Glucose.: 505 mg/dL (01-03-22 @ 07:33)  POCT Blood Glucose.: 514 mg/dL (01-03-22 @ 07:31)  POCT Blood Glucose.: 317 mg/dL (01-02-22 @ 21:20)  POCT Blood Glucose.: 236 mg/dL (01-02-22 @ 16:24)  POCT Blood Glucose.: 221 mg/dL (01-02-22 @ 11:14)  POCT Blood Glucose.: 277 mg/dL (01-02-22 @ 08:11)  POCT Blood Glucose.: 352 mg/dL (01-01-22 @ 21:15)  POCT Blood Glucose.: 248 mg/dL (01-01-22 @ 16:15)  POCT Blood Glucose.: 277 mg/dL (01-01-22 @ 11:17)  POCT Blood Glucose.: 268 mg/dL (01-01-22 @ 07:24)  POCT Blood Glucose.: 344 mg/dL (12-31-21 @ 21:09)                            7.1    2.42  )-----------( 201      ( 03 Jan 2022 07:12 )             23.7       01-03    134<L>  |  101  |  46<H>  ----------------------------<  556<HH>  4.6   |  19<L>  |  1.87<H>    EGFR if : 42<L>  EGFR if non : 37<L>    Ca    8.5      01-03  Mg     1.9     01-03  Phos  2.2     01-03    TPro  5.7<L>  /  Alb  3.0<L>  /  TBili  0.1<L>  /  DBili  x   /  AST  56<H>  /  ALT  180<H>  /  AlkPhos  359<H>  01-03      Thyroid Function Tests:              Radiology:

## 2022-01-03 NOTE — PROGRESS NOTE ADULT - SUBJECTIVE AND OBJECTIVE BOX
Dori Cramer, PGY-1  TEAMS or Pager [phone] / [phone]  ---------------------------------------------------------------------------------------------  Patient is a 66y old  Male who presents with a chief complaint of Fall (02 Jan 2022 07:36)      SUBJECTIVE / OVERNIGHT EVENTS:  ADDITIONAL REVIEW OF SYSTEMS:  CONSTITUTIONAL: No weakness, fevers or chills  EYES/ENT: No visual changes;  No vertigo or throat pain   NECK: No pain or stiffness  RESPIRATORY: No cough, wheezing, hemoptysis; No shortness of breath  CARDIOVASCULAR: No chest pain or palpitations  GASTROINTESTINAL: No abdominal or epigastric pain. No nausea, vomiting, or hematemesis; No diarrhea or constipation. No melena or hematochezia.  GENITOURINARY: No dysuria, frequency or hematuria  NEUROLOGICAL: No numbness or weakness  SKIN: No itching, rashes      MEDICATIONS  (STANDING):  atorvastatin 40 milliGRAM(s) Oral at bedtime  dexAMETHasone     Tablet 6 milliGRAM(s) Oral daily  dextrose 40% Gel 15 Gram(s) Oral once  dextrose 5%. 1000 milliLiter(s) (50 mL/Hr) IV Continuous <Continuous>  dextrose 5%. 1000 milliLiter(s) (100 mL/Hr) IV Continuous <Continuous>  dextrose 50% Injectable 25 Gram(s) IV Push once  dextrose 50% Injectable 12.5 Gram(s) IV Push once  dextrose 50% Injectable 25 Gram(s) IV Push once  everolimus (ZORTRESS) 4 milliGRAM(s) Oral two times a day  glucagon  Injectable 1 milliGRAM(s) IntraMuscular once  heparin   Injectable 5000 Unit(s) SubCutaneous every 8 hours  insulin glargine Injectable (LANTUS) 32 Unit(s) SubCutaneous at bedtime  insulin lispro (ADMELOG) corrective regimen sliding scale   SubCutaneous three times a day before meals  insulin lispro (ADMELOG) corrective regimen sliding scale   SubCutaneous at bedtime  insulin lispro Injectable (ADMELOG) 23 Unit(s) SubCutaneous three times a day before meals  nystatin    Suspension 980702 Unit(s) Oral four times a day  pantoprazole    Tablet 40 milliGRAM(s) Oral two times a day  trimethoprim   80 mG/sulfamethoxazole 400 mG 1 Tablet(s) Oral daily  valGANciclovir 450 milliGRAM(s) Oral daily    MEDICATIONS  (PRN):  benzocaine 15 mG/menthol 3.6 mG (Sugar-Free) Lozenge 1 Lozenge Oral every 6 hours PRN Sore Throat  benzonatate 100 milliGRAM(s) Oral every 8 hours PRN Cough      CAPILLARY BLOOD GLUCOSE      POCT Blood Glucose.: 317 mg/dL (02 Jan 2022 21:20)  POCT Blood Glucose.: 236 mg/dL (02 Jan 2022 16:24)  POCT Blood Glucose.: 221 mg/dL (02 Jan 2022 11:14)  POCT Blood Glucose.: 277 mg/dL (02 Jan 2022 08:11)    I&O's Summary    02 Jan 2022 07:01  -  03 Jan 2022 07:00  --------------------------------------------------------  IN: 480 mL / OUT: 600 mL / NET: -120 mL        PHYSICAL EXAM:  Vital Signs Last 24 Hrs  T(C): 36.4 (03 Jan 2022 04:58), Max: 36.8 (02 Jan 2022 11:19)  T(F): 97.5 (03 Jan 2022 04:58), Max: 98.2 (02 Jan 2022 11:19)  HR: 88 (03 Jan 2022 04:58) (80 - 95)  BP: 121/68 (03 Jan 2022 04:58) (116/72 - 136/72)  BP(mean): --  RR: 18 (03 Jan 2022 04:58) (18 - 18)  SpO2: 91% (03 Jan 2022 04:58) (91% - 94%)    CONSTITUTIONAL: NAD, well-developed  HEENT: normal conjunctiva, PEERLA  RESPIRATORY: b/l decreased lung sounds  CARDIOVASCULAR: Regular rate and rhythm, normal S1 and S2, no murmur/rub/gallop;   ABDOMEN: No tenderness to palpation at all four quadrants, +BS  MUSCULOSKELETAL no clubbing or cyanosis of digits; no joint swelling or tenderness to palpation  EXTREMITIES No lower extremity edema; Peripheral pulses are 2+ bilaterally  SKIN: well-perfused, no dry skin, pt subjectively complained about tingling/numbness      LABS:                        7.3    2.77  )-----------( 190      ( 02 Jan 2022 04:57 )             23.1     01-02    135  |  102  |  41<H>  ----------------------------<  261<H>  4.3   |  20<L>  |  1.91<H>    Ca    8.4      02 Jan 2022 04:57  Phos  2.6     01-02  Mg     2.0     01-02    TPro  6.0  /  Alb  3.0<L>  /  TBili  0.2  /  DBili  x   /  AST  69<H>  /  ALT  184<H>  /  AlkPhos  346<H>  01-02    PT/INR - ( 02 Jan 2022 04:57 )   PT: 11.8 sec;   INR: 0.98 ratio         PTT - ( 02 Jan 2022 04:57 )  PTT:28.8 sec                RADIOLOGY & ADDITIONAL TESTS:  Results Reviewed:   Imaging Personally Reviewed:  Electrocardiogram Personally Reviewed:    COORDINATION OF CARE:  Care Discussed with Consultants/Other Providers [Y/N]:  Prior or Outpatient Records Reviewed [Y/N]:   Dori Cramer, PGY-1  TEAMS or Pager [phone] / [phone]  ---------------------------------------------------------------------------------------------  Patient is a 66y old  Male who presents with a chief complaint of Fall (02 Jan 2022 07:36)      SUBJECTIVE / OVERNIGHT EVENTS:  NAEOVN, pt denies any pain    ADDITIONAL REVIEW OF SYSTEMS:  CONSTITUTIONAL: No weakness, fevers or chills  EYES/ENT: No visual changes;  No vertigo or throat pain   NECK: No pain or stiffness  RESPIRATORY: No cough, wheezing, hemoptysis; No shortness of breath  CARDIOVASCULAR: No chest pain or palpitations  GASTROINTESTINAL: No abdominal or epigastric pain. No nausea, vomiting, or hematemesis; No diarrhea or constipation. No melena or hematochezia.  GENITOURINARY: No dysuria, frequency or hematuria  NEUROLOGICAL: No numbness or weakness  SKIN: No itching, rashes      MEDICATIONS  (STANDING):  atorvastatin 40 milliGRAM(s) Oral at bedtime  dexAMETHasone     Tablet 6 milliGRAM(s) Oral daily  dextrose 40% Gel 15 Gram(s) Oral once  dextrose 5%. 1000 milliLiter(s) (50 mL/Hr) IV Continuous <Continuous>  dextrose 5%. 1000 milliLiter(s) (100 mL/Hr) IV Continuous <Continuous>  dextrose 50% Injectable 25 Gram(s) IV Push once  dextrose 50% Injectable 12.5 Gram(s) IV Push once  dextrose 50% Injectable 25 Gram(s) IV Push once  everolimus (ZORTRESS) 4 milliGRAM(s) Oral two times a day  glucagon  Injectable 1 milliGRAM(s) IntraMuscular once  heparin   Injectable 5000 Unit(s) SubCutaneous every 8 hours  insulin glargine Injectable (LANTUS) 32 Unit(s) SubCutaneous at bedtime  insulin lispro (ADMELOG) corrective regimen sliding scale   SubCutaneous three times a day before meals  insulin lispro (ADMELOG) corrective regimen sliding scale   SubCutaneous at bedtime  insulin lispro Injectable (ADMELOG) 23 Unit(s) SubCutaneous three times a day before meals  nystatin    Suspension 854137 Unit(s) Oral four times a day  pantoprazole    Tablet 40 milliGRAM(s) Oral two times a day  trimethoprim   80 mG/sulfamethoxazole 400 mG 1 Tablet(s) Oral daily  valGANciclovir 450 milliGRAM(s) Oral daily    MEDICATIONS  (PRN):  benzocaine 15 mG/menthol 3.6 mG (Sugar-Free) Lozenge 1 Lozenge Oral every 6 hours PRN Sore Throat  benzonatate 100 milliGRAM(s) Oral every 8 hours PRN Cough      CAPILLARY BLOOD GLUCOSE      POCT Blood Glucose.: 317 mg/dL (02 Jan 2022 21:20)  POCT Blood Glucose.: 236 mg/dL (02 Jan 2022 16:24)  POCT Blood Glucose.: 221 mg/dL (02 Jan 2022 11:14)  POCT Blood Glucose.: 277 mg/dL (02 Jan 2022 08:11)    I&O's Summary    02 Jan 2022 07:01  -  03 Jan 2022 07:00  --------------------------------------------------------  IN: 480 mL / OUT: 600 mL / NET: -120 mL        PHYSICAL EXAM:  Vital Signs Last 24 Hrs  T(C): 36.4 (03 Jan 2022 04:58), Max: 36.8 (02 Jan 2022 11:19)  T(F): 97.5 (03 Jan 2022 04:58), Max: 98.2 (02 Jan 2022 11:19)  HR: 88 (03 Jan 2022 04:58) (80 - 95)  BP: 121/68 (03 Jan 2022 04:58) (116/72 - 136/72)  BP(mean): --  RR: 18 (03 Jan 2022 04:58) (18 - 18)  SpO2: 91% (03 Jan 2022 04:58) (91% - 94%)    CONSTITUTIONAL: NAD, well-developed  HEENT: normal conjunctiva, PEERLA  RESPIRATORY: b/l decreased lung sounds  CARDIOVASCULAR: Regular rate and rhythm, normal S1 and S2, no murmur/rub/gallop;   ABDOMEN: No tenderness to palpation at all four quadrants, +BS  MUSCULOSKELETAL no clubbing or cyanosis of digits; no joint swelling or tenderness to palpation  EXTREMITIES No lower extremity edema; Peripheral pulses are 2+ bilaterally  SKIN: well-perfused, no dry skin, pt subjectively complained about tingling/numbness      LABS:                                   7.1    2.42  )-----------( 201      ( 03 Jan 2022 07:12 )             23.7     01-03    134<L>  |  101  |  46<H>  ----------------------------<  556<HH>  4.6   |  19<L>  |  1.87<H>    Ca    8.5      03 Jan 2022 07:10  Phos  2.2     01-03  Mg     1.9     01-03    TPro  5.7<L>  /  Alb  3.0<L>  /  TBili  0.1<L>  /  DBili  x   /  AST  56<H>  /  ALT  180<H>  /  AlkPhos  359<H>  01-03    PT/INR - ( 03 Jan 2022 07:11 )   PT: 11.4 sec;   INR: 0.95 ratio         PTT - ( 03 Jan 2022 07:11 )  PTT:35.8 sec          RADIOLOGY & ADDITIONAL TESTS:  Results Reviewed:   Imaging Personally Reviewed:  Electrocardiogram Personally Reviewed:    COORDINATION OF CARE:  Care Discussed with Consultants/Other Providers [Y/N]:  Prior or Outpatient Records Reviewed [Y/N]:

## 2022-01-03 NOTE — PROGRESS NOTE ADULT - ASSESSMENT
67 y/o M PMHx DDLT (07/2020, c/b post-op VRE bacteremia), prior ESBL e.coli prostate abscess, R heel OM (12/2020), C. 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 Currently await rehab placement. Recent CT chest showed possible worsening of COVID PNA, currently on solumedrol. 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 Diff (12/2020), DM, MGUS, and HFpEF, presenting for unwitnessed fall. Currently await rehab placement. Recent CT chest showed possible worsening of COVID PNA, currently on solumedrol.

## 2022-01-03 NOTE — CONSULT NOTE ADULT - ATTENDING COMMENTS
Agree with fellow's plan above. Adjust basal/bolus regimen as patient remains severely hyperglycemic.    Kala Rios (pager [phone])  On evenings and weekends, please call [phone] or page endocrine fellow on call.   Please note that this patient may be followed by different provider tomorrow. If no answer, contact endocrine fellow on call.

## 2022-01-03 NOTE — PROGRESS NOTE ADULT - PROBLEM SELECTOR PLAN 5
H/H slowly downtrending. Per nursing, patient had "dark brown" stools. Unfortunately sample was not saved so could not assess. Previous endoscopy was done roughly 1 year ago for melena and was shown to have varices and bleeding. Stable. No additional melena  - Blood consent in chart  - Active type and screen  - trend CBC  - pRBC PRN when hgb less than 7  - Gi prophlyaxis: protonix 40 q12   - FOBT positive  - Hep not planning in scoping currently

## 2022-01-03 NOTE — PROGRESS NOTE ADULT - PROBLEM SELECTOR PLAN 6
Mildly elevated admission AST/ALT at 45/66 respectively. Recent ultrasound showed good graft flow.   - Transplant hepatology recs appreciated  - RUQ US showed coarsened hepatic echotexture   - Liver biopsy (12/30), preliminary path shows no rejection   - Will cont to monitor LFT's

## 2022-01-03 NOTE — PROVIDER CONTACT NOTE (CRITICAL VALUE NOTIFICATION) - ACTION/TREATMENT ORDERED:
MD made aware, administer SS and standing insulin, to discuss endo consult with team, continue to monitor.
Provider made aware. PRBCs ordered. safety mainatined.

## 2022-01-03 NOTE — PROGRESS NOTE ADULT - ATTENDING COMMENTS
Metabolic syndrome, obesity, JEAN cirrhosis s/p liver transplant 7/2/20 w/ CKD on everolimus, MMF, pred 5 mg daily here with 1 month of cough found to have bilateral pulmonary infiltrates and COVID+. Not requiring supplemental oxygen, afebrile, and not short of breath.    Concern for everolimus causing non-infectious pneumonitis vs COVID although improvement in symptoms while on everolimus suggests COVID-19 pneumonia.  Recommend continue to hold MMF and continuing EVR.  Liver biopsy for elevated liver enzymes reported inconclusive from last week. I suspect that is related more to Bactrim that was started around 12/20.  Needs follow-up CT in 1-2 months.   Needs endocrinology consultation for better control of glucose while on dexamethasone.

## 2022-01-03 NOTE — PROGRESS NOTE ADULT - SUBJECTIVE AND OBJECTIVE BOX
Gastroenterology/Hepatology Progress Note      Interval Events:   - over weekend oxygenation improving, now on room air, satting 91%  - labs w/ worsening hyperglycemia  - liver enzymes downtrending     Allergies:  codeine (Anaphylaxis)      Hospital Medications:  atorvastatin 40 milliGRAM(s) Oral at bedtime  benzocaine 15 mG/menthol 3.6 mG (Sugar-Free) Lozenge 1 Lozenge Oral every 6 hours PRN  benzonatate 100 milliGRAM(s) Oral every 8 hours PRN  dexAMETHasone     Tablet 6 milliGRAM(s) Oral daily  dextrose 40% Gel 15 Gram(s) Oral once  dextrose 5%. 1000 milliLiter(s) IV Continuous <Continuous>  dextrose 5%. 1000 milliLiter(s) IV Continuous <Continuous>  dextrose 50% Injectable 25 Gram(s) IV Push once  dextrose 50% Injectable 12.5 Gram(s) IV Push once  dextrose 50% Injectable 25 Gram(s) IV Push once  everolimus (ZORTRESS) 4 milliGRAM(s) Oral two times a day  glucagon  Injectable 1 milliGRAM(s) IntraMuscular once  heparin   Injectable 5000 Unit(s) SubCutaneous every 8 hours  insulin glargine Injectable (LANTUS) 42 Unit(s) SubCutaneous at bedtime  insulin lispro (ADMELOG) corrective regimen sliding scale   SubCutaneous at bedtime  insulin lispro (ADMELOG) corrective regimen sliding scale   SubCutaneous three times a day before meals  insulin lispro Injectable (ADMELOG) 27 Unit(s) SubCutaneous three times a day before meals  nystatin    Suspension 933353 Unit(s) Oral four times a day  pantoprazole    Tablet 40 milliGRAM(s) Oral two times a day  trimethoprim   80 mG/sulfamethoxazole 400 mG 1 Tablet(s) Oral daily  valGANciclovir 450 milliGRAM(s) Oral daily        PHYSICAL EXAM:   Vital Signs:  Vital Signs Last 24 Hrs  T(C): 36.4 (03 Jan 2022 04:58), Max: 36.8 (02 Jan 2022 11:19)  T(F): 97.5 (03 Jan 2022 04:58), Max: 98.2 (02 Jan 2022 11:19)  HR: 88 (03 Jan 2022 04:58) (80 - 95)  BP: 121/68 (03 Jan 2022 04:58) (116/72 - 136/72)  BP(mean): --  RR: 18 (03 Jan 2022 04:58) (18 - 18)  SpO2: 91% (03 Jan 2022 04:58) (91% - 94%)  Daily     Daily     GENERAL:  No acute distress  HEENT:  NCAT, no scleral icterus  CHEST: no resp distress  HEART:  RRR  ABDOMEN:  Soft, non-tender, non-distended, normoactive bowel sounds, no masses  EXTREMITIES:  No cyanosis, clubbing, or edema  SKIN:  No rash/erythema/ecchymoses/petechiae/wounds/abscess/warm/dry  NEURO:  Alert and oriented x 3, no asterixis, no tremor    LABS:                        7.1    2.42  )-----------( 201      ( 03 Jan 2022 07:12 )             23.7     Mean Cell Volume: 87.8 fl (01-03-22 @ 07:12)    01-03    134<L>  |  101  |  46<H>  ----------------------------<  556<HH>  4.6   |  19<L>  |  1.87<H>    Ca    8.5      03 Jan 2022 07:10  Phos  2.2     01-03  Mg     1.9     01-03    TPro  5.7<L>  /  Alb  3.0<L>  /  TBili  0.1<L>  /  DBili  x   /  AST  56<H>  /  ALT  180<H>  /  AlkPhos  359<H>  01-03    LIVER FUNCTIONS - ( 03 Jan 2022 07:10 )  Alb: 3.0 g/dL / Pro: 5.7 g/dL / ALK PHOS: 359 U/L / ALT: 180 U/L / AST: 56 U/L / GGT: x           PT/INR - ( 03 Jan 2022 07:11 )   PT: 11.4 sec;   INR: 0.95 ratio         PTT - ( 03 Jan 2022 07:11 )  PTT:35.8 sec          Imaging:           Gastroenterology/Hepatology Progress Note      Interval Events:   - over weekend oxygenation improving, now on room air, satting 91%  - labs w/ worsening hyperglycemia  - liver enzymes w/ improving alk phos (491->359), downtrending AST/ALT (now 56/180)  - liver biopsy pathology pending    Allergies:  codeine (Anaphylaxis)      Hospital Medications:  atorvastatin 40 milliGRAM(s) Oral at bedtime  benzocaine 15 mG/menthol 3.6 mG (Sugar-Free) Lozenge 1 Lozenge Oral every 6 hours PRN  benzonatate 100 milliGRAM(s) Oral every 8 hours PRN  dexAMETHasone     Tablet 6 milliGRAM(s) Oral daily  dextrose 40% Gel 15 Gram(s) Oral once  dextrose 5%. 1000 milliLiter(s) IV Continuous <Continuous>  dextrose 5%. 1000 milliLiter(s) IV Continuous <Continuous>  dextrose 50% Injectable 25 Gram(s) IV Push once  dextrose 50% Injectable 12.5 Gram(s) IV Push once  dextrose 50% Injectable 25 Gram(s) IV Push once  everolimus (ZORTRESS) 4 milliGRAM(s) Oral two times a day  glucagon  Injectable 1 milliGRAM(s) IntraMuscular once  heparin   Injectable 5000 Unit(s) SubCutaneous every 8 hours  insulin glargine Injectable (LANTUS) 42 Unit(s) SubCutaneous at bedtime  insulin lispro (ADMELOG) corrective regimen sliding scale   SubCutaneous at bedtime  insulin lispro (ADMELOG) corrective regimen sliding scale   SubCutaneous three times a day before meals  insulin lispro Injectable (ADMELOG) 27 Unit(s) SubCutaneous three times a day before meals  nystatin    Suspension [id number] Unit(s) Oral four times a day  pantoprazole    Tablet 40 milliGRAM(s) Oral two times a day  trimethoprim   80 mG/sulfamethoxazole 400 mG 1 Tablet(s) Oral daily  valGANciclovir 450 milliGRAM(s) Oral daily        PHYSICAL EXAM:   Vital Signs:  Vital Signs Last 24 Hrs  T(C): 36.4 (03 Jan 2022 04:58), Max: 36.8 (02 Jan 2022 11:19)  T(F): 97.5 (03 Jan 2022 04:58), Max: 98.2 (02 Jan 2022 11:19)  HR: 88 (03 Jan 2022 04:58) (80 - 95)  BP: 121/68 (03 Jan 2022 04:58) (116/72 - 136/72)  BP(mean): --  RR: 18 (03 Jan 2022 04:58) (18 - 18)  SpO2: 91% (03 Jan 2022 04:58) (91% - 94%)  Daily     Daily     GENERAL:  No acute distress  HEENT:  NCAT, no scleral icterus  CHEST: no resp distress on RA  HEART:  RRR  ABDOMEN:  surgical site c/d/i, nontender, no r/g  EXTREMITIES:  No cyanosis, clubbing, or edema  SKIN:  No rash/erythema/ecchymoses/petechiae/wounds/abscess/warm/dry  NEURO:  Alert and oriented x 3, no asterixis, no tremor    LABS:                        7.1    2.42  )-----------( 201      ( 03 Jan 2022 07:12 )             23.7     Mean Cell Volume: 87.8 fl (01-03-22 @ 07:12)    01-03    134<L>  |  101  |  46<H>  ----------------------------<  556<HH>  4.6   |  19<L>  |  1.87<H>    Ca    8.5      03 Jan 2022 07:10  Phos  2.2     01-03  Mg     1.9     01-03    TPro  5.7<L>  /  Alb  3.0<L>  /  TBili  0.1<L>  /  DBili  x   /  AST  56<H>  /  ALT  180<H>  /  AlkPhos  359<H>  01-03    LIVER FUNCTIONS - ( 03 Jan 2022 07:10 )  Alb: 3.0 g/dL / Pro: 5.7 g/dL / ALK PHOS: 359 U/L / ALT: 180 U/L / AST: 56 U/L / GGT: x           PT/INR - ( 03 Jan 2022 07:11 )   PT: 11.4 sec;   INR: 0.95 ratio         PTT - ( 03 Jan 2022 07:11 )  PTT:35.8 sec          Imaging:

## 2022-01-03 NOTE — PROGRESS NOTE ADULT - ASSESSMENT
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 DDx includes COVID ischemic injury (less likely), DILI (possibly 2/2 bactrim started 12/20/21); will d/c bactrim and monitor  # fall - likely 2/2 pulmonary symptoms; trop negative, TTE (poor study) negative, no events on tele, no EEG, no brain MRI, plan for outpt follow up  # pulmonary symptoms - patient w/ 1+ month of upper respiratory symptoms, of cough. Presented to ED 11/19/21 with negative workup; this current presentation now found to be COVID positive w/ CT chest showing patchy groundglass opacities. DDx for symptoms include COVID (though had symptoms of cough 1 month ago) vs everolimus pneumonitis. 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 Patient was seen by pulmonary, unable to differentiate between everolimus pneumonitis and COVID. Case was reviewed with radiology - believes that CT findings more consistent with COVID (patchy ground glass infiltrates) and not everolimus pneumonitis (no fibrosis). Would recommend treating for COVID (remdesivir) and continuing current immunosuppression with plan to re-image in a short interval. If patient's respiratory symptoms worsen, will readdress role of holding everolimus and possibly higher doses of steroids for pneumonitis.     # DM - poorly controlled DM, A1c 10.1    # anemia - patient w/ acute on chronic anemia, had dark stools, though unclear if melena (green around edges) and on iron; will continue w/ supportive management, iron supplementation, transfusions prn, no plans for endoscopic evaluation at this time    Recommendations:  - stop bactrim, please start atovaquone 1500mg qd (spoke w/ transplant ID and ok with switch)  - c/w everolimus 4mg q12h  - hold cellcept   - c/w dexamethasone 6 PO qd  - glycemic control, recommend endocrine consult  - c/w valcyte   - s/p remdesivir   - trend CRP, ferritin, Ddimer  - transfuse 1u pRBC today  - pantropazole 40mg PO BID  - no plans for endoscopic evaluation   - trend CMP, INR  - rest of care per primary team    Hepatology will continue to follow.     Charles Salgado, PGY5  Gastroenterology/Hepatology Fellow  Available on [employer] Teams  [phone] ([facility] Short Range Pager)  [phone] (Long Range Pager)    After 5pm, please contact the on-call GI fellow. [phone]

## 2022-01-04 ENCOUNTER — APPOINTMENT (OUTPATIENT)
Dept: TRANSPLANT | Facility: CLINIC | Age: 67
End: 2022-01-04

## 2022-01-04 DIAGNOSIS — I10 ESSENTIAL (PRIMARY) HYPERTENSION: ICD-10-CM

## 2022-01-04 LAB
ALBUMIN SERPL ELPH-MCNC: 3 G/DL — LOW (ref 3.3–5)
ALP SERPL-CCNC: 285 U/L — HIGH (ref 40–120)
ALT FLD-CCNC: 156 U/L — HIGH (ref 10–45)
ANION GAP SERPL CALC-SCNC: 13 MMOL/L — SIGNIFICANT CHANGE UP (ref 5–17)
APTT BLD: 30.7 SEC — SIGNIFICANT CHANGE UP (ref 27.5–35.5)
AST SERPL-CCNC: 42 U/L — HIGH (ref 10–40)
BASOPHILS # BLD AUTO: 0 K/UL — SIGNIFICANT CHANGE UP (ref 0–0.2)
BASOPHILS NFR BLD AUTO: 0 % — SIGNIFICANT CHANGE UP (ref 0–2)
BILIRUB SERPL-MCNC: 0.2 MG/DL — SIGNIFICANT CHANGE UP (ref 0.2–1.2)
BUN SERPL-MCNC: 46 MG/DL — HIGH (ref 7–23)
CALCIUM SERPL-MCNC: 8.5 MG/DL — SIGNIFICANT CHANGE UP (ref 8.4–10.5)
CHLORIDE SERPL-SCNC: 104 MMOL/L — SIGNIFICANT CHANGE UP (ref 96–108)
CO2 SERPL-SCNC: 21 MMOL/L — LOW (ref 22–31)
CREAT SERPL-MCNC: 1.83 MG/DL — HIGH (ref 0.5–1.3)
CRP SERPL-MCNC: 7 MG/L — HIGH (ref 0–4)
D DIMER BLD IA.RAPID-MCNC: 2420 NG/ML DDU — HIGH
EOSINOPHIL # BLD AUTO: 0.02 K/UL — SIGNIFICANT CHANGE UP (ref 0–0.5)
EOSINOPHIL NFR BLD AUTO: 0.7 % — SIGNIFICANT CHANGE UP (ref 0–6)
EVEROLIMUS, WHOLE BLOOD RESULT: 13.2 NG/ML — HIGH (ref 3–8)
FERRITIN SERPL-MCNC: 702 NG/ML — HIGH (ref 30–400)
GLUCOSE SERPL-MCNC: 139 MG/DL — HIGH (ref 70–99)
HCT VFR BLD CALC: 26.4 % — LOW (ref 39–50)
HCT VFR BLD CALC: 26.8 % — LOW (ref 39–50)
HGB BLD-MCNC: 8.2 G/DL — LOW (ref 13–17)
HGB BLD-MCNC: 8.5 G/DL — LOW (ref 13–17)
IMM GRANULOCYTES NFR BLD AUTO: 1.7 % — HIGH (ref 0–1.5)
INR BLD: 0.98 RATIO — SIGNIFICANT CHANGE UP (ref 0.88–1.16)
LYMPHOCYTES # BLD AUTO: 0.54 K/UL — LOW (ref 1–3.3)
LYMPHOCYTES # BLD AUTO: 18.8 % — SIGNIFICANT CHANGE UP (ref 13–44)
MCHC RBC-ENTMCNC: 26.3 PG — LOW (ref 27–34)
MCHC RBC-ENTMCNC: 26.6 PG — LOW (ref 27–34)
MCHC RBC-ENTMCNC: 31.1 GM/DL — LOW (ref 32–36)
MCHC RBC-ENTMCNC: 31.7 GM/DL — LOW (ref 32–36)
MCV RBC AUTO: 83.8 FL — SIGNIFICANT CHANGE UP (ref 80–100)
MCV RBC AUTO: 84.6 FL — SIGNIFICANT CHANGE UP (ref 80–100)
MONOCYTES # BLD AUTO: 0.21 K/UL — SIGNIFICANT CHANGE UP (ref 0–0.9)
MONOCYTES NFR BLD AUTO: 7.3 % — SIGNIFICANT CHANGE UP (ref 2–14)
NEUTROPHILS # BLD AUTO: 2.06 K/UL — SIGNIFICANT CHANGE UP (ref 1.8–7.4)
NEUTROPHILS NFR BLD AUTO: 71.5 % — SIGNIFICANT CHANGE UP (ref 43–77)
NRBC # BLD: 0 /100 WBCS — SIGNIFICANT CHANGE UP (ref 0–0)
NRBC # BLD: 0 /100 WBCS — SIGNIFICANT CHANGE UP (ref 0–0)
PLATELET # BLD AUTO: 177 K/UL — SIGNIFICANT CHANGE UP (ref 150–400)
PLATELET # BLD AUTO: 182 K/UL — SIGNIFICANT CHANGE UP (ref 150–400)
POTASSIUM SERPL-MCNC: 4.3 MMOL/L — SIGNIFICANT CHANGE UP (ref 3.5–5.3)
POTASSIUM SERPL-SCNC: 4.3 MMOL/L — SIGNIFICANT CHANGE UP (ref 3.5–5.3)
PROT SERPL-MCNC: 5.9 G/DL — LOW (ref 6–8.3)
PROTHROM AB SERPL-ACNC: 11.8 SEC — SIGNIFICANT CHANGE UP (ref 10.6–13.6)
RBC # BLD: 3.12 M/UL — LOW (ref 4.2–5.8)
RBC # BLD: 3.2 M/UL — LOW (ref 4.2–5.8)
RBC # FLD: 15.1 % — HIGH (ref 10.3–14.5)
RBC # FLD: 15.2 % — HIGH (ref 10.3–14.5)
SARS-COV-2 RNA SPEC QL NAA+PROBE: DETECTED
SODIUM SERPL-SCNC: 138 MMOL/L — SIGNIFICANT CHANGE UP (ref 135–145)
WBC # BLD: 2.88 K/UL — LOW (ref 3.8–10.5)
WBC # BLD: 3.69 K/UL — LOW (ref 3.8–10.5)
WBC # FLD AUTO: 2.88 K/UL — LOW (ref 3.8–10.5)
WBC # FLD AUTO: 3.69 K/UL — LOW (ref 3.8–10.5)

## 2022-01-04 PROCEDURE — 47000 NEEDLE BIOPSY OF LIVER PERQ: CPT

## 2022-01-04 PROCEDURE — 99232 SBSQ HOSP IP/OBS MODERATE 35: CPT

## 2022-01-04 PROCEDURE — 99232 SBSQ HOSP IP/OBS MODERATE 35: CPT | Mod: GC

## 2022-01-04 PROCEDURE — 99233 SBSQ HOSP IP/OBS HIGH 50: CPT | Mod: GC

## 2022-01-04 PROCEDURE — 76942 ECHO GUIDE FOR BIOPSY: CPT | Mod: 26

## 2022-01-04 RX ORDER — EVEROLIMUS 10 MG/1
3 TABLET ORAL
Refills: 0 | Status: DISCONTINUED | OUTPATIENT
Start: 2022-01-04 | End: 2022-01-06

## 2022-01-04 RX ORDER — ENOXAPARIN SODIUM 100 MG/ML
40 INJECTION SUBCUTANEOUS EVERY 12 HOURS
Refills: 0 | Status: DISCONTINUED | OUTPATIENT
Start: 2022-01-04 | End: 2022-01-05

## 2022-01-04 RX ADMIN — PANTOPRAZOLE SODIUM 40 MILLIGRAM(S): 20 TABLET, DELAYED RELEASE ORAL at 17:51

## 2022-01-04 RX ADMIN — Medication 27 UNIT(S): at 12:06

## 2022-01-04 RX ADMIN — Medication 27 UNIT(S): at 08:20

## 2022-01-04 RX ADMIN — ATOVAQUONE 1500 MILLIGRAM(S): 750 SUSPENSION ORAL at 12:07

## 2022-01-04 RX ADMIN — Medication 500000 UNIT(S): at 00:36

## 2022-01-04 RX ADMIN — Medication 2: at 08:20

## 2022-01-04 RX ADMIN — Medication 500000 UNIT(S): at 12:06

## 2022-01-04 RX ADMIN — HEPARIN SODIUM 5000 UNIT(S): 5000 INJECTION INTRAVENOUS; SUBCUTANEOUS at 05:27

## 2022-01-04 RX ADMIN — Medication 6 MILLIGRAM(S): at 05:27

## 2022-01-04 RX ADMIN — Medication 500000 UNIT(S): at 05:27

## 2022-01-04 RX ADMIN — Medication 2: at 17:48

## 2022-01-04 RX ADMIN — Medication 2: at 12:05

## 2022-01-04 RX ADMIN — VALGANCICLOVIR 450 MILLIGRAM(S): 450 TABLET, FILM COATED ORAL at 12:07

## 2022-01-04 RX ADMIN — INSULIN GLARGINE 46 UNIT(S): 100 INJECTION, SOLUTION SUBCUTANEOUS at 22:37

## 2022-01-04 RX ADMIN — HEPARIN SODIUM 5000 UNIT(S): 5000 INJECTION INTRAVENOUS; SUBCUTANEOUS at 12:06

## 2022-01-04 RX ADMIN — Medication 27 UNIT(S): at 17:49

## 2022-01-04 RX ADMIN — PANTOPRAZOLE SODIUM 40 MILLIGRAM(S): 20 TABLET, DELAYED RELEASE ORAL at 05:27

## 2022-01-04 RX ADMIN — ATORVASTATIN CALCIUM 40 MILLIGRAM(S): 80 TABLET, FILM COATED ORAL at 22:37

## 2022-01-04 RX ADMIN — Medication 500000 UNIT(S): at 17:50

## 2022-01-04 RX ADMIN — EVEROLIMUS 3 MILLIGRAM(S): 10 TABLET ORAL at 17:50

## 2022-01-04 NOTE — PROGRESS NOTE ADULT - PROBLEM SELECTOR PLAN 3
C/w atorvastatin 40 milliGRAM(s) Oral at bedtime  -pt on Rosuvastatin 20mg plus Zetia 10mg PTA. restart upon discharge to rehab  -F/u levels as out pt C/w atorvastatin 40 milliGRAM(s) Oral at bedtime  -pt on Rosuvastatin 20mg plus Zetia 10mg and Lovaza PTA>  restart upon discharge to rehab  -F/u levels as out pt

## 2022-01-04 NOTE — PROGRESS NOTE ADULT - PROBLEM SELECTOR PLAN 11
Home insulin regimen 12 lantus and 10 humalog premeal. A1c 10.1.  -now on increasing insulin requirements due to steroid induced hyperglycemia  - Endocrine consulted

## 2022-01-04 NOTE — PROGRESS NOTE ADULT - ASSESSMENT
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 DDx includes COVID ischemic injury (less likely), DILI (possibly 2/2 bactrim started 12/20/21); bactrim discontinued 1/3/21, now w/ downtrending liver enzymes  # fall - likely 2/2 pulmonary symptoms; trop negative, TTE (poor study) negative, no events on tele, no EEG, no brain MRI, plan for outpt follow up  # pulmonary symptoms - patient w/ 1+ month of upper respiratory symptoms, of cough. Presented to ED 11/19/21 with negative workup; this current presentation now found to be COVID positive w/ CT chest showing patchy groundglass opacities. DDx for symptoms include COVID (though had symptoms of cough 1 month ago) vs everolimus pneumonitis. 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 Patient was seen by pulmonary, unable to differentiate between everolimus pneumonitis and COVID. Case was reviewed with radiology - believes that CT findings more consistent with COVID (patchy ground glass infiltrates) and not everolimus pneumonitis (no fibrosis). Would recommend treating for COVID (remdesivir) and continuing current immunosuppression with plan to re-image in a short interval. If patient's respiratory symptoms worsen, will readdress role of holding everolimus and possibly higher doses of steroids for pneumonitis.     # DM - poorly controlled DM, A1c 10.1, better FSG since 1/3/21    # anemia - patient w/ acute on chronic anemia, had dark stools, though unclear if melena (green around edges) and on iron; will continue w/ supportive management, iron supplementation, transfusions prn, no plans for endoscopic evaluation at this time    Recommendations:  - continue to hold bactrim, please start atovaquone 1500mg qd while on higher dose of steroids (spoke w/ transplant ID and ok with switch)  - decrease everolimus to 3mg q12  - stable for discharge from hepatology perspective   - please check everolimus level tomorrow morning BEFORE morning dose of everolimus if still in hospital. Likely that previously elevated level does not represent true trough   - hold cellcept   - c/w dexamethasone 6 PO qd per ID  - glycemic control per endo  - c/w valcyte   - s/p remdesivir   - pantropazole 40mg PO BID  - no plans for endoscopic evaluation   - trend CMP, INR  - rest of care per primary team    Hepatology will continue to follow.     Charles Salgado, PGY5  Gastroenterology/Hepatology Fellow  Available on [employer] Teams  [phone] ([facility] Short Range Pager)  [phone] (Long Range Pager)    After 5pm, please contact the on-call GI fellow. [phone]   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 (course c/b VRE bacteremia, CNI toxicity to both tacro and cyclosporine; switched to everolimus 7/27/20), HLD, obesity, HFpEF presenting w/ fall    # elevated liver enzymes - newly elevated AST/ALT 12/28/2, unclear etiology, no abd pain, nl bili, s/p liver biopsy 12/30/21 w/o evidence of rejection. DDx includes COVID ischemic injury (less likely), DILI (possibly 2/2 bactrim started 12/20/21); bactrim discontinued 1/3/21, now w/ downtrending liver enzymes  # fall - likely 2/2 pulmonary symptoms; trop negative, TTE (poor study) negative, no events on tele, no EEG, no brain MRI, plan for outpt follow up  # pulmonary symptoms - patient w/ 1+ month of upper respiratory symptoms, of cough. Presented to ED 11/19/21 with negative workup; this current presentation now found to be COVID positive w/ CT chest showing patchy groundglass opacities. DDx for symptoms include COVID (though had symptoms of cough 1 month ago) vs everolimus pneumonitis. 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 Patient was seen by pulmonary, unable to differentiate between everolimus pneumonitis and COVID. Case was reviewed with radiology - believes that CT findings more consistent with COVID (patchy ground glass infiltrates) and not everolimus pneumonitis (no fibrosis). Would recommend treating for COVID (remdesivir) and continuing current immunosuppression with plan to re-image in a short interval. If patient's respiratory symptoms worsen, will readdress role of holding everolimus and possibly higher doses of steroids for pneumonitis.     # DM - poorly controlled DM, A1c 10.1, better FSG since 1/3/21    # anemia - patient w/ acute on chronic anemia, had dark stools, though unclear if melena (green around edges) and on iron; will continue w/ supportive management, iron supplementation, transfusions prn, no plans for endoscopic evaluation at this time    Recommendations:  - continue to hold bactrim, please start atovaquone 1500mg qd while on higher dose of steroids (spoke w/ transplant ID and ok with switch)  - decrease everolimus to 3mg q12  - stable for discharge from hepatology perspective   - please check everolimus level tomorrow morning BEFORE morning dose of everolimus if still in hospital.   - hold cellcept   - c/w dexamethasone 6 PO qd per ID  - glycemic control per endo  - c/w valcyte   - s/p remdesivir   - pantropazole 40mg PO BID  - no plans for endoscopic evaluation   - trend CMP, INR  - rest of care per primary team    Hepatology will continue to follow.     Charles Salgado, PGY5  Gastroenterology/Hepatology Fellow  Available on [employer] Teams  [phone] ([facility] Short Range Pager)  [phone] (Long Range Pager)    After 5pm, please contact the on-call GI fellow. [phone]

## 2022-01-04 NOTE — PROGRESS NOTE ADULT - SUBJECTIVE AND OBJECTIVE BOX
Patient is a 63y Male whom presented to the hospital with ckd and dena   pateint seen and examined nad , no fever , no chills      PAST MEDICAL & SURGICAL HISTORY:  GIB (gastrointestinal bleeding)  GERD with esophagitis: Gastritis &amp; Non Bleeding Ulcers  Hepatic encephalopathy  Obesity  Fatty liver disease, nonalcoholic  Renal stones: 25 years ago  Hypertension  Neuropathy  Hypercholesteremia  Diabetes  S/P cholecystectomy      MEDICATIONS  (STANDING):  dextrose 5%. 1000 milliLiter(s) (50 mL/Hr) IV Continuous <Continuous>  dextrose 50% Injectable 12.5 Gram(s) IV Push once  dextrose 50% Injectable 25 Gram(s) IV Push once  dextrose 50% Injectable 25 Gram(s) IV Push once  insulin lispro (HumaLOG) corrective regimen sliding scale   SubCutaneous three times a day before meals  insulin lispro (HumaLOG) corrective regimen sliding scale   SubCutaneous at bedtime      Allergies    codeine (Anaphylaxis)    Intolerances    NO  RED MEAT (Unknown)                                     SOCIAL HISTORY:  Denies ETOh,Smoking,     FAMILY HISTORY:  Family history of type 2 diabetes mellitus  Family history of hypertension  Family history of stomach cancer      REVIEW OF SYSTEMS:    CONSTITUTIONAL: No weakness, fevers or chills  RESPIRATORY: No cough, wheezing, hemoptysis; No shortness of breath  CARDIOVASCULAR: No chest pain or palpitations  GASTROINTESTINAL: No abdominal or epigastric pain. No nausea, vomiting,     No diarrhea or constipation. No melena   GENITOURINARY: No dysuria, frequency or hematuria  NEUROLOGICAL: No numbness or weakness  SKIN: dry                                                   8.4    4.08  )-----------( 43       ( 15 Dec 2019 11:22 )             25.2       CBC Full  -  ( 15 Dec 2019 11:22 )  WBC Count : 4.08 K/uL  RBC Count : 2.56 M/uL  Hemoglobin : 8.4 g/dL  Hematocrit : 25.2 %  Platelet Count - Automated : 43 K/uL  Mean Cell Volume : 98.4 fl  Mean Cell Hemoglobin : 32.8 pg  Mean Cell Hemoglobin Concentration : 33.3 gm/dL  Auto Neutrophil # : x  Auto Lymphocyte # : x  Auto Monocyte # : x  Auto Eosinophil # : x  Auto Basophil # : x  Auto Neutrophil % : x  Auto Lymphocyte % : x  Auto Monocyte % : x  Auto Eosinophil % : x  Auto Basophil % : x      12-15    134<L>  |  98  |  25<H>  ----------------------------<  185<H>  3.8   |  26  |  1.25    Ca    10.5      15 Dec 2019 07:22    TPro  5.7<L>  /  Alb  3.2<L>  /  TBili  8.2<H>  /  DBili  x   /  AST  27  /  ALT  20  /  AlkPhos  111  12-14      CAPILLARY BLOOD GLUCOSE      POCT Blood Glucose.: 238 mg/dL (15 Dec 2019 12:16)  POCT Blood Glucose.: 191 mg/dL (15 Dec 2019 08:45)  POCT Blood Glucose.: 223 mg/dL (14 Dec 2019 21:52)  POCT Blood Glucose.: 230 mg/dL (14 Dec 2019 17:56)      Vital Signs Last 24 Hrs  T(C): 36.6 (15 Dec 2019 14:07), Max: 36.7 (15 Dec 2019 05:10)  T(F): 97.8 (15 Dec 2019 14:07), Max: 98 (15 Dec 2019 05:10)  HR: 78 (15 Dec 2019 14:07) (78 - 82)  BP: 123/69 (15 Dec 2019 14:07) (108/65 - 125/70)  BP(mean): --  RR: 18 (15 Dec 2019 14:07) (18 - 18)  SpO2: 97% (15 Dec 2019 14:07) (95% - 99%)          PHYSICAL EXAM:    Constitutional: NAD  HEENT: conjunctive   clear   Neck:  No JVD  Respiratory: CTAB  Cardiovascular: S1 and S2  Gastrointestinal: BS+, soft, NT/ND  Extremities: No peripheral edema  Neurological:  no focal deficits  Psychiatric: Normal mood, normal affect                        8.4    4.08  )-----------( 43       ( 15 Dec 2019 11:22 )             25.2       CBC Full  -  ( 15 Dec 2019 11:22 )  WBC Count : 4.08 K/uL  RBC Count : 2.56 M/uL  Hemoglobin : 8.4 g/dL  Hematocrit : 25.2 %  Platelet Count - Automated : 43 K/uL  Mean Cell Volume : 98.4 fl  Mean Cell Hemoglobin : 32.8 pg  Mean Cell Hemoglobin Concentration : 33.3 gm/dL  Auto Neutrophil # : x  Auto Lymphocyte # : x  Auto Monocyte # : x  Auto Eosinophil # : x  Auto Basophil # : x  Auto Neutrophil % : x  Auto Lymphocyte % : x  Auto Monocyte % : x  Auto Eosinophil % : x  Auto Basophil % : x      12-15    134<L>  |  98  |  25<H>  ----------------------------<  185<H>  3.8   |  26  |  1.25    Ca    10.5      15 Dec 2019 07:22    TPro  5.7<L>  /  Alb  3.2<L>  /  TBili  8.2<H>  /  DBili  x   /  AST  27  /  ALT  20  /  AlkPhos  111  12-14      CAPILLARY BLOOD GLUCOSE      POCT Blood Glucose.: 238 mg/dL (15 Dec 2019 12:16)  POCT Blood Glucose.: 191 mg/dL (15 Dec 2019 08:45)  POCT Blood Glucose.: 223 mg/dL (14 Dec 2019 21:52)  POCT Blood Glucose.: 230 mg/dL (14 Dec 2019 17:56)      Vital Signs Last 24 Hrs  T(C): 36.6 (15 Dec 2019 14:07), Max: 36.7 (15 Dec 2019 05:10)  T(F): 97.8 (15 Dec 2019 14:07), Max: 98 (15 Dec 2019 05:10)  HR: 78 (15 Dec 2019 14:07) (78 - 82)  BP: 123/69 (15 Dec 2019 14:07) (108/65 - 125/70)  BP(mean): --  RR: 18 (15 Dec 2019 14:07) (18 - 18)  SpO2: 97% (15 Dec 2019 14:07) (95% - 99%)        Skin: dry   Access: Not applicable 57 30

## 2022-01-04 NOTE — PROGRESS NOTE ADULT - SUBJECTIVE AND OBJECTIVE BOX
Dori Cramer, PGY-1  TEAMS or Pager [phone] / [phone]  ---------------------------------------------------------------------------------------------  Patient is a 66y old  Male who presents with a chief complaint of Fall (02 Jan 2022 07:36)      SUBJECTIVE / OVERNIGHT EVENTS:  NAEOVN, pt denies any pain    ADDITIONAL REVIEW OF SYSTEMS:  CONSTITUTIONAL: No weakness, fevers or chills  EYES/ENT: No visual changes;  No vertigo or throat pain   NECK: No pain or stiffness  RESPIRATORY: No cough, wheezing, hemoptysis; No shortness of breath  CARDIOVASCULAR: No chest pain or palpitations  GASTROINTESTINAL: No abdominal or epigastric pain. No nausea, vomiting, or hematemesis; No diarrhea or constipation. No melena or hematochezia.  GENITOURINARY: No dysuria, frequency or hematuria  NEUROLOGICAL: No numbness or weakness  SKIN: No itching, rashes      MEDICATIONS  (STANDING):  atorvastatin 40 milliGRAM(s) Oral at bedtime  dexAMETHasone     Tablet 6 milliGRAM(s) Oral daily  dextrose 40% Gel 15 Gram(s) Oral once  dextrose 5%. 1000 milliLiter(s) (50 mL/Hr) IV Continuous <Continuous>  dextrose 5%. 1000 milliLiter(s) (100 mL/Hr) IV Continuous <Continuous>  dextrose 50% Injectable 25 Gram(s) IV Push once  dextrose 50% Injectable 12.5 Gram(s) IV Push once  dextrose 50% Injectable 25 Gram(s) IV Push once  everolimus (ZORTRESS) 4 milliGRAM(s) Oral two times a day  glucagon  Injectable 1 milliGRAM(s) IntraMuscular once  heparin   Injectable 5000 Unit(s) SubCutaneous every 8 hours  insulin glargine Injectable (LANTUS) 32 Unit(s) SubCutaneous at bedtime  insulin lispro (ADMELOG) corrective regimen sliding scale   SubCutaneous three times a day before meals  insulin lispro (ADMELOG) corrective regimen sliding scale   SubCutaneous at bedtime  insulin lispro Injectable (ADMELOG) 23 Unit(s) SubCutaneous three times a day before meals  nystatin    Suspension 950000 Unit(s) Oral four times a day  pantoprazole    Tablet 40 milliGRAM(s) Oral two times a day  trimethoprim   80 mG/sulfamethoxazole 400 mG 1 Tablet(s) Oral daily  valGANciclovir 450 milliGRAM(s) Oral daily    MEDICATIONS  (PRN):  benzocaine 15 mG/menthol 3.6 mG (Sugar-Free) Lozenge 1 Lozenge Oral every 6 hours PRN Sore Throat  benzonatate 100 milliGRAM(s) Oral every 8 hours PRN Cough      CAPILLARY BLOOD GLUCOSE      POCT Blood Glucose.: 317 mg/dL (02 Jan 2022 21:20)  POCT Blood Glucose.: 236 mg/dL (02 Jan 2022 16:24)  POCT Blood Glucose.: 221 mg/dL (02 Jan 2022 11:14)  POCT Blood Glucose.: 277 mg/dL (02 Jan 2022 08:11)    I&O's Summary    02 Jan 2022 07:01  -  03 Jan 2022 07:00  --------------------------------------------------------  IN: 480 mL / OUT: 600 mL / NET: -120 mL        PHYSICAL EXAM:  Vital Signs Last 24 Hrs  T(C): 36.4 (03 Jan 2022 04:58), Max: 36.8 (02 Jan 2022 11:19)  T(F): 97.5 (03 Jan 2022 04:58), Max: 98.2 (02 Jan 2022 11:19)  HR: 88 (03 Jan 2022 04:58) (80 - 95)  BP: 121/68 (03 Jan 2022 04:58) (116/72 - 136/72)  BP(mean): --  RR: 18 (03 Jan 2022 04:58) (18 - 18)  SpO2: 91% (03 Jan 2022 04:58) (91% - 94%)    CONSTITUTIONAL: NAD, well-developed  HEENT: normal conjunctiva, PEERLA  RESPIRATORY: b/l decreased lung sounds  CARDIOVASCULAR: Regular rate and rhythm, normal S1 and S2, no murmur/rub/gallop;   ABDOMEN: No tenderness to palpation at all four quadrants, +BS  MUSCULOSKELETAL no clubbing or cyanosis of digits; no joint swelling or tenderness to palpation  EXTREMITIES No lower extremity edema; Peripheral pulses are 2+ bilaterally  SKIN: well-perfused, no dry skin, pt subjectively complained about tingling/numbness      LABS:                                   7.1    2.42  )-----------( 201      ( 03 Jan 2022 07:12 )             23.7     01-03    134<L>  |  101  |  46<H>  ----------------------------<  556<HH>  4.6   |  19<L>  |  1.87<H>    Ca    8.5      03 Jan 2022 07:10  Phos  2.2     01-03  Mg     1.9     01-03    TPro  5.7<L>  /  Alb  3.0<L>  /  TBili  0.1<L>  /  DBili  x   /  AST  56<H>  /  ALT  180<H>  /  AlkPhos  359<H>  01-03    PT/INR - ( 03 Jan 2022 07:11 )   PT: 11.4 sec;   INR: 0.95 ratio         PTT - ( 03 Jan 2022 07:11 )  PTT:35.8 sec          RADIOLOGY & ADDITIONAL TESTS:  Results Reviewed:   Imaging Personally Reviewed:  Electrocardiogram Personally Reviewed:    COORDINATION OF CARE:  Care Discussed with Consultants/Other Providers [Y/N]:  Prior or Outpatient Records Reviewed [Y/N]:   Dori Cramer, PGY-1  TEAMS or Pager [phone] / [phone]  ---------------------------------------------------------------------------------------------  Patient is a 66y old  Male who presents with a chief complaint of Fall (02 Jan 2022 07:36)      SUBJECTIVE / OVERNIGHT EVENTS:  NAEOVN, pt denies any pain    ADDITIONAL REVIEW OF SYSTEMS:  CONSTITUTIONAL: No weakness, fevers or chills  EYES/ENT: No visual changes;  No vertigo or throat pain   NECK: No pain or stiffness  RESPIRATORY: No cough, wheezing, hemoptysis; No shortness of breath  CARDIOVASCULAR: No chest pain or palpitations  GASTROINTESTINAL: No abdominal or epigastric pain. No nausea, vomiting, or hematemesis; No diarrhea or constipation. No melena or hematochezia.  GENITOURINARY: No dysuria, frequency or hematuria  NEUROLOGICAL: No numbness or weakness  SKIN: No itching, rashes      MEDICATIONS  (STANDING):  atorvastatin 40 milliGRAM(s) Oral at bedtime  dexAMETHasone     Tablet 6 milliGRAM(s) Oral daily  dextrose 40% Gel 15 Gram(s) Oral once  dextrose 5%. 1000 milliLiter(s) (50 mL/Hr) IV Continuous <Continuous>  dextrose 5%. 1000 milliLiter(s) (100 mL/Hr) IV Continuous <Continuous>  dextrose 50% Injectable 25 Gram(s) IV Push once  dextrose 50% Injectable 12.5 Gram(s) IV Push once  dextrose 50% Injectable 25 Gram(s) IV Push once  everolimus (ZORTRESS) 4 milliGRAM(s) Oral two times a day  glucagon  Injectable 1 milliGRAM(s) IntraMuscular once  heparin   Injectable 5000 Unit(s) SubCutaneous every 8 hours  insulin glargine Injectable (LANTUS) 32 Unit(s) SubCutaneous at bedtime  insulin lispro (ADMELOG) corrective regimen sliding scale   SubCutaneous three times a day before meals  insulin lispro (ADMELOG) corrective regimen sliding scale   SubCutaneous at bedtime  insulin lispro Injectable (ADMELOG) 23 Unit(s) SubCutaneous three times a day before meals  nystatin    Suspension 934548 Unit(s) Oral four times a day  pantoprazole    Tablet 40 milliGRAM(s) Oral two times a day  trimethoprim   80 mG/sulfamethoxazole 400 mG 1 Tablet(s) Oral daily  valGANciclovir 450 milliGRAM(s) Oral daily    MEDICATIONS  (PRN):  benzocaine 15 mG/menthol 3.6 mG (Sugar-Free) Lozenge 1 Lozenge Oral every 6 hours PRN Sore Throat  benzonatate 100 milliGRAM(s) Oral every 8 hours PRN Cough      CAPILLARY BLOOD GLUCOSE      POCT Blood Glucose.: 317 mg/dL (02 Jan 2022 21:20)  POCT Blood Glucose.: 236 mg/dL (02 Jan 2022 16:24)  POCT Blood Glucose.: 221 mg/dL (02 Jan 2022 11:14)  POCT Blood Glucose.: 277 mg/dL (02 Jan 2022 08:11)    I&O's Summary    02 Jan 2022 07:01  -  03 Jan 2022 07:00  --------------------------------------------------------  IN: 480 mL / OUT: 600 mL / NET: -120 mL        PHYSICAL EXAM:  Vital Signs Last 24 Hrs  T(C): 36.4 (03 Jan 2022 04:58), Max: 36.8 (02 Jan 2022 11:19)  T(F): 97.5 (03 Jan 2022 04:58), Max: 98.2 (02 Jan 2022 11:19)  HR: 88 (03 Jan 2022 04:58) (80 - 95)  BP: 121/68 (03 Jan 2022 04:58) (116/72 - 136/72)  BP(mean): --  RR: 18 (03 Jan 2022 04:58) (18 - 18)  SpO2: 91% (03 Jan 2022 04:58) (91% - 94%)    CONSTITUTIONAL: NAD, well-developed  HEENT: normal conjunctiva, PEERLA  RESPIRATORY: b/l decreased lung sounds  CARDIOVASCULAR: Regular rate and rhythm, normal S1 and S2, no murmur/rub/gallop;   ABDOMEN: No tenderness to palpation at all four quadrants, +BS  MUSCULOSKELETAL no clubbing or cyanosis of digits; no joint swelling or tenderness to palpation  EXTREMITIES No lower extremity edema; Peripheral pulses are 2+ bilaterally  SKIN: well-perfused, no dry skin, pt subjectively complained about tingling/numbness      LABS:                                   8.5    2.88  )-----------( 182      ( 04 Jan 2022 07:25 )             26.8     01-04    138  |  104  |  46<H>  ----------------------------<  139<H>  4.3   |  21<L>  |  1.83<H>    Ca    8.5      04 Jan 2022 07:24  Phos  2.2     01-03  Mg     1.9     01-03    TPro  5.9<L>  /  Alb  3.0<L>  /  TBili  0.2  /  DBili  x   /  AST  42<H>  /  ALT  156<H>  /  AlkPhos  285<H>  01-04          RADIOLOGY & ADDITIONAL TESTS:  Results Reviewed:   Imaging Personally Reviewed:  Electrocardiogram Personally Reviewed:    COORDINATION OF CARE:  Care Discussed with Consultants/Other Providers [Y/N]:  Prior or Outpatient Records Reviewed [Y/N]:

## 2022-01-04 NOTE — PROGRESS NOTE ADULT - ASSESSMENT
67 y/o M w/h/o uncontrolled T2DM on insulin and chronic low dose of Prednisone PTA (HbA1C 10.1). DM c/b neuropathy, DFU, retinopathy. Also h/o HFpEF, liver transplant in 2020 for JEAN cirrhosis, MGUS, presented s/p fall. Awaiting rehab placement. Found to be COVID positive, on steroids. Endocrine consulted for T2DM with hyperglycemia. Pt glucotoxic yesterday but glucose greatly improved BG today while on present insulin doses. Will continue with present doses and adjust to BG goal 100 to 180s. No hypoglycemia.   RD and team to evaluate pt regarding request for extra salt with meals.                  67 y/o M w/h/o uncontrolled T2DM on insulin and chronic low dose of Prednisone PTA (HbA1C 10.1). DM c/b neuropathy, DFU, retinopathy. Also h/o HFpEF, liver transplant in 2020 for JEAN cirrhosis, MGUS, presented s/p fall. Awaiting rehab placement. Found to be COVID positive, on steroids. Endocrine consulted for T2DM with hyperglycemia. Pt glucotoxic yesterday but glucose greatly improved BG today while on present insulin doses. Will continue with present doses and adjust to BG goal 100 to 180s. No hypoglycemia.   RD and team to evaluate and speak to pt regarding request for extra salt with meals. Has h/o HTN

## 2022-01-04 NOTE — PROGRESS NOTE ADULT - SUBJECTIVE AND OBJECTIVE BOX
Gastroenterology/Hepatology Progress Note      Interval Events:   - no acute events overnight  - seen by endo, improved glycemic control  - this morning AM labs w/ improving liver enzymes  - everolimus level from 12/30 resulted 17 (appears to have been drawn after AM dose which was given at 5:25am, everolimus level drawn at 12:30pm)    Allergies:  codeine (Anaphylaxis)      Hospital Medications:  atorvastatin 40 milliGRAM(s) Oral at bedtime  atovaquone  Suspension 1500 milliGRAM(s) Oral daily  benzocaine 15 mG/menthol 3.6 mG (Sugar-Free) Lozenge 1 Lozenge Oral every 6 hours PRN  benzonatate 100 milliGRAM(s) Oral every 8 hours PRN  dexAMETHasone     Tablet 6 milliGRAM(s) Oral daily  dextrose 40% Gel 15 Gram(s) Oral once  dextrose 5%. 1000 milliLiter(s) IV Continuous <Continuous>  dextrose 5%. 1000 milliLiter(s) IV Continuous <Continuous>  dextrose 50% Injectable 25 Gram(s) IV Push once  dextrose 50% Injectable 12.5 Gram(s) IV Push once  dextrose 50% Injectable 25 Gram(s) IV Push once  everolimus (ZORTRESS) 3 milliGRAM(s) Oral two times a day  glucagon  Injectable 1 milliGRAM(s) IntraMuscular once  heparin   Injectable 5000 Unit(s) SubCutaneous every 8 hours  insulin glargine Injectable (LANTUS) 46 Unit(s) SubCutaneous at bedtime  insulin lispro (ADMELOG) corrective regimen sliding scale   SubCutaneous at bedtime  insulin lispro (ADMELOG) corrective regimen sliding scale   SubCutaneous three times a day before meals  insulin lispro Injectable (ADMELOG) 27 Unit(s) SubCutaneous three times a day before meals  nystatin    Suspension 296444 Unit(s) Oral four times a day  pantoprazole    Tablet 40 milliGRAM(s) Oral two times a day  valGANciclovir 450 milliGRAM(s) Oral daily        PHYSICAL EXAM:   Vital Signs:  Vital Signs Last 24 Hrs  T(C): 36.8 (04 Jan 2022 05:01), Max: 37.1 (03 Jan 2022 19:22)  T(F): 98.3 (04 Jan 2022 05:01), Max: 98.8 (03 Jan 2022 19:22)  HR: 85 (04 Jan 2022 05:01) (81 - 92)  BP: 155/80 (04 Jan 2022 05:01) (116/69 - 173/81)  BP(mean): --  RR: 19 (04 Jan 2022 05:01) (18 - 19)  SpO2: 94% (04 Jan 2022 05:01) (93% - 94%)  Daily     Daily     GENERAL:  No acute distress  HEENT:  NCAT, no scleral icterus  CHEST: no resp distress on RA  HEART:  RRR  ABDOMEN:  surgical site c/d/i, nontender, no r/g  EXTREMITIES:  No cyanosis, clubbing, or edema  SKIN:  No rash/erythema/ecchymoses/petechiae/wounds/abscess/warm/dry  NEURO:  Alert and oriented x 3, no asterixis, no tremor  LABS:                        8.5    2.88  )-----------( 182      ( 04 Jan 2022 07:25 )             26.8     Mean Cell Volume: 83.8 fl (01-04-22 @ 07:25)    01-04    138  |  104  |  46<H>  ----------------------------<  139<H>  4.3   |  21<L>  |  1.83<H>    Ca    8.5      04 Jan 2022 07:24  Phos  2.2     01-03  Mg     1.9     01-03    TPro  5.9<L>  /  Alb  3.0<L>  /  TBili  0.2  /  DBili  x   /  AST  42<H>  /  ALT  156<H>  /  AlkPhos  285<H>  01-04    LIVER FUNCTIONS - ( 04 Jan 2022 07:24 )  Alb: 3.0 g/dL / Pro: 5.9 g/dL / ALK PHOS: 285 U/L / ALT: 156 U/L / AST: 42 U/L / GGT: x           PT/INR - ( 04 Jan 2022 07:24 )   PT: 11.8 sec;   INR: 0.98 ratio         PTT - ( 04 Jan 2022 07:24 )  PTT:30.7 sec          Imaging:           Gastroenterology/Hepatology Progress Note      Interval Events:   - no acute events overnight  - seen by endo, improved glycemic control  - this morning AM labs w/ improving liver enzymes  - everolimus level from 12/30 resulted 17 (appears to have been drawn after AM dose which was given at 5:25am, everolimus level drawn presumably at 7 AM)    Allergies:  codeine (Anaphylaxis)      Hospital Medications:  atorvastatin 40 milliGRAM(s) Oral at bedtime  atovaquone  Suspension 1500 milliGRAM(s) Oral daily  benzocaine 15 mG/menthol 3.6 mG (Sugar-Free) Lozenge 1 Lozenge Oral every 6 hours PRN  benzonatate 100 milliGRAM(s) Oral every 8 hours PRN  dexAMETHasone     Tablet 6 milliGRAM(s) Oral daily  dextrose 40% Gel 15 Gram(s) Oral once  dextrose 5%. 1000 milliLiter(s) IV Continuous <Continuous>  dextrose 5%. 1000 milliLiter(s) IV Continuous <Continuous>  dextrose 50% Injectable 25 Gram(s) IV Push once  dextrose 50% Injectable 12.5 Gram(s) IV Push once  dextrose 50% Injectable 25 Gram(s) IV Push once  everolimus (ZORTRESS) 3 milliGRAM(s) Oral two times a day  glucagon  Injectable 1 milliGRAM(s) IntraMuscular once  heparin   Injectable 5000 Unit(s) SubCutaneous every 8 hours  insulin glargine Injectable (LANTUS) 46 Unit(s) SubCutaneous at bedtime  insulin lispro (ADMELOG) corrective regimen sliding scale   SubCutaneous at bedtime  insulin lispro (ADMELOG) corrective regimen sliding scale   SubCutaneous three times a day before meals  insulin lispro Injectable (ADMELOG) 27 Unit(s) SubCutaneous three times a day before meals  nystatin    Suspension 221368 Unit(s) Oral four times a day  pantoprazole    Tablet 40 milliGRAM(s) Oral two times a day  valGANciclovir 450 milliGRAM(s) Oral daily        PHYSICAL EXAM:   Vital Signs:  Vital Signs Last 24 Hrs  T(C): 36.8 (04 Jan 2022 05:01), Max: 37.1 (03 Jan 2022 19:22)  T(F): 98.3 (04 Jan 2022 05:01), Max: 98.8 (03 Jan 2022 19:22)  HR: 85 (04 Jan 2022 05:01) (81 - 92)  BP: 155/80 (04 Jan 2022 05:01) (116/69 - 173/81)  BP(mean): --  RR: 19 (04 Jan 2022 05:01) (18 - 19)  SpO2: 94% (04 Jan 2022 05:01) (93% - 94%)  Daily     Daily     GENERAL:  No acute distress  HEENT:  NCAT, no scleral icterus  CHEST: no resp distress on RA  HEART:  RRR  ABDOMEN:  surgical site c/d/i, nontender, no r/g  EXTREMITIES:  No cyanosis, clubbing, or edema  SKIN:  No rash/erythema/ecchymoses/petechiae/wounds/abscess/warm/dry  NEURO:  Alert and oriented x 3, no asterixis, no tremor  LABS:                        8.5    2.88  )-----------( 182      ( 04 Jan 2022 07:25 )             26.8     Mean Cell Volume: 83.8 fl (01-04-22 @ 07:25)    01-04    138  |  104  |  46<H>  ----------------------------<  139<H>  4.3   |  21<L>  |  1.83<H>    Ca    8.5      04 Jan 2022 07:24  Phos  2.2     01-03  Mg     1.9     01-03    TPro  5.9<L>  /  Alb  3.0<L>  /  TBili  0.2  /  DBili  x   /  AST  42<H>  /  ALT  156<H>  /  AlkPhos  285<H>  01-04    LIVER FUNCTIONS - ( 04 Jan 2022 07:24 )  Alb: 3.0 g/dL / Pro: 5.9 g/dL / ALK PHOS: 285 U/L / ALT: 156 U/L / AST: 42 U/L / GGT: x           PT/INR - ( 04 Jan 2022 07:24 )   PT: 11.8 sec;   INR: 0.98 ratio         PTT - ( 04 Jan 2022 07:24 )  PTT:30.7 sec          Imaging:

## 2022-01-04 NOTE — PROGRESS NOTE ADULT - PROBLEM SELECTOR PLAN 7
- Transplant hepatology recs appreciated  - Transplant surgery recs appreciated  - Transplant ID recs appreciated  - c/w everolimus 4mg q12h  - hold cellcept in the setting of ongoing COVID infection  - c/w dexamethasone 6 PO qd  - c/w bactrim and valcyte - Transplant hepatology recs appreciated  - Transplant surgery recs appreciated  - Transplant ID recs appreciated  - c/w everolimus 3mg q12h  - hold cellcept in the setting of ongoing COVID infection  - c/w dexamethasone 6 PO qd  - c/w bactrim and valcyte

## 2022-01-04 NOTE — PROGRESS NOTE ADULT - SUBJECTIVE AND OBJECTIVE BOX
DIABETES FOLLOW UP NOTE: Saw pt earlier today  INTERVAL HX: Pt stable, reports tolerating TFs  with improved BG levels while on present insulin doses. No hypoglycemia. Remains on Dexa 6mg for now. Also on Prednisone 5mg at home after liver tx. No complaints verbalized except that he dislikes hospital food and wants salt in his meals. States he doesn't have HTN so he can eat salty food freely. Noted  in EMR h/o HTN but not on any meds.     Review of Systems:  General: As above  Cardiovascular: No chest pain, palpitations  Respiratory: No SOB, no cough  GI: No nausea, vomiting, abdominal pain  Endocrine: No polyuria, polydipsia or S&Sx of hypoglycemia    Allergies    codeine (Anaphylaxis)    Intolerances      MEDICATIONS:  atorvastatin 40 milliGRAM(s) Oral at bedtime  dexAMETHasone     Tablet 6 milliGRAM(s) Oral daily  everolimus (ZORTRESS) 3 milliGRAM(s) Oral two times a day  insulin glargine Injectable (LANTUS) 46 Unit(s) SubCutaneous at bedtime  insulin lispro (ADMELOG) corrective regimen sliding scale   SubCutaneous three times a day before meals  insulin lispro (ADMELOG) corrective regimen sliding scale   SubCutaneous at bedtime  insulin lispro Injectable (ADMELOG) 27 Unit(s) SubCutaneous three times a day before meals  valGANciclovir 450 milliGRAM(s) Oral daily      PHYSICAL EXAM:  VITALS: T(C): 37.1 (01-04-22 @ 12:38)  T(F): 98.7 (01-04-22 @ 12:38), Max: 98.8 (01-03-22 @ 19:22)  HR: 84 (01-04-22 @ 12:38) (81 - 92)  BP: 133/78 (01-04-22 @ 12:38) (123/63 - 173/81)  RR:  (19 - 19)  SpO2:  (93% - 97%)  Wt(kg): --  GENERAL: Male laying in bed in NAD  Abdomen: Soft, nontender, non distended, central adiposity  Extremities: Warm, no edema in all 4 exts  NEURO: A&O X3.     LABS:  POCT Blood Glucose.: 183 mg/dL (01-04-22 @ 11:52)  POCT Blood Glucose.: 155 mg/dL (01-04-22 @ 07:49)  POCT Blood Glucose.: 211 mg/dL (01-03-22 @ 21:16)  POCT Blood Glucose.: 252 mg/dL (01-03-22 @ 16:28)  POCT Blood Glucose.: 238 mg/dL (01-03-22 @ 11:53)  POCT Blood Glucose.: 505 mg/dL (01-03-22 @ 07:33)  POCT Blood Glucose.: 514 mg/dL (01-03-22 @ 07:31)  POCT Blood Glucose.: 317 mg/dL (01-02-22 @ 21:20)  POCT Blood Glucose.: 236 mg/dL (01-02-22 @ 16:24)  POCT Blood Glucose.: 221 mg/dL (01-02-22 @ 11:14)  POCT Blood Glucose.: 277 mg/dL (01-02-22 @ 08:11)  POCT Blood Glucose.: 352 mg/dL (01-01-22 @ 21:15)  POCT Blood Glucose.: 248 mg/dL (01-01-22 @ 16:15)                            8.5    2.88  )-----------( 182      ( 04 Jan 2022 07:25 )             26.8       01-04    138  |  104  |  46<H>  ----------------------------<  139<H>  4.3   |  21<L>  |  1.83<H>    EGFR if non : 38<L>    Ca    8.5      01-04  Mg     1.9     01-03  Phos  2.2     01-03    TPro  5.9<L>  /  Alb  3.0<L>  /  TBili  0.2  /  DBili  x   /  AST  42<H>  /  ALT  156<H>  /  AlkPhos  285<H>  01-04    A1C with Estimated Average Glucose Result: 10.1 % (12-21-21 @ 16:29)      Estimated Average Glucose: 243 mg/dL (12-21-21 @ 16:29)                       DIABETES FOLLOW UP NOTE: Saw pt earlier today  INTERVAL HX: Pt stable, reports tolerating TFs  with improved BG levels while on present insulin doses. No hypoglycemia. Remains on Dexa 6mg for now. Also on Prednisone 5mg at home after liver tx. No complaints verbalized except that he dislikes hospital food and wants salt in his meals. States he doesn't have HTN so he can eat salty food freely. Noted  in EMR h/o HTN on Nifedipine XR at home      Review of Systems:  General: As above  Cardiovascular: No chest pain, palpitations  Respiratory: No SOB, no cough  GI: No nausea, vomiting, abdominal pain  Endocrine: No polyuria, polydipsia or S&Sx of hypoglycemia    Allergies    codeine (Anaphylaxis)    Intolerances      MEDICATIONS:  atorvastatin 40 milliGRAM(s) Oral at bedtime  dexAMETHasone     Tablet 6 milliGRAM(s) Oral daily  everolimus (ZORTRESS) 3 milliGRAM(s) Oral two times a day  insulin glargine Injectable (LANTUS) 46 Unit(s) SubCutaneous at bedtime  insulin lispro (ADMELOG) corrective regimen sliding scale   SubCutaneous three times a day before meals  insulin lispro (ADMELOG) corrective regimen sliding scale   SubCutaneous at bedtime  insulin lispro Injectable (ADMELOG) 27 Unit(s) SubCutaneous three times a day before meals  valGANciclovir 450 milliGRAM(s) Oral daily      PHYSICAL EXAM:  VITALS: T(C): 37.1 (01-04-22 @ 12:38)  T(F): 98.7 (01-04-22 @ 12:38), Max: 98.8 (01-03-22 @ 19:22)  HR: 84 (01-04-22 @ 12:38) (81 - 92)  BP: 133/78 (01-04-22 @ 12:38) (123/63 - 173/81)  RR:  (19 - 19)  SpO2:  (93% - 97%)  Wt(kg): --  GENERAL: Male laying in bed in NAD  Abdomen: Soft, nontender, non distended, central adiposity  Extremities: Warm, no edema in all 4 exts  NEURO: A&O X3.     LABS:  POCT Blood Glucose.: 183 mg/dL (01-04-22 @ 11:52)  POCT Blood Glucose.: 155 mg/dL (01-04-22 @ 07:49)  POCT Blood Glucose.: 211 mg/dL (01-03-22 @ 21:16)  POCT Blood Glucose.: 252 mg/dL (01-03-22 @ 16:28)  POCT Blood Glucose.: 238 mg/dL (01-03-22 @ 11:53)  POCT Blood Glucose.: 505 mg/dL (01-03-22 @ 07:33)  POCT Blood Glucose.: 514 mg/dL (01-03-22 @ 07:31)  POCT Blood Glucose.: 317 mg/dL (01-02-22 @ 21:20)  POCT Blood Glucose.: 236 mg/dL (01-02-22 @ 16:24)  POCT Blood Glucose.: 221 mg/dL (01-02-22 @ 11:14)  POCT Blood Glucose.: 277 mg/dL (01-02-22 @ 08:11)  POCT Blood Glucose.: 352 mg/dL (01-01-22 @ 21:15)  POCT Blood Glucose.: 248 mg/dL (01-01-22 @ 16:15)                            8.5    2.88  )-----------( 182      ( 04 Jan 2022 07:25 )             26.8       01-04    138  |  104  |  46<H>  ----------------------------<  139<H>  4.3   |  21<L>  |  1.83<H>    EGFR if non : 38<L>    Ca    8.5      01-04  Mg     1.9     01-03  Phos  2.2     01-03    TPro  5.9<L>  /  Alb  3.0<L>  /  TBili  0.2  /  DBili  x   /  AST  42<H>  /  ALT  156<H>  /  AlkPhos  285<H>  01-04    A1C with Estimated Average Glucose Result: 10.1 % (12-21-21 @ 16:29)      Estimated Average Glucose: 243 mg/dL (12-21-21 @ 16:29)                       DIABETES FOLLOW UP NOTE: Saw pt earlier today  INTERVAL HX: Pt stable, reports tolerating POs with improved BG levels while on present insulin doses. No hypoglycemia. Remains on Dexa 6mg for now. Also on Prednisone 5mg at home after liver tx. No complaints verbalized except that he dislikes hospital food and wants salt in his meals. States he doesn't have HTN so he can eat salty food freely. Noted  in EMR h/o HTN on Nifedipine XR at home      Review of Systems:  General: As above  Cardiovascular: No chest pain, palpitations  Respiratory: No SOB, no cough  GI: No nausea, vomiting, abdominal pain  Endocrine: No polyuria, polydipsia or S&Sx of hypoglycemia    Allergies    codeine (Anaphylaxis)    Intolerances      MEDICATIONS:  atorvastatin 40 milliGRAM(s) Oral at bedtime  dexAMETHasone     Tablet 6 milliGRAM(s) Oral daily  everolimus (ZORTRESS) 3 milliGRAM(s) Oral two times a day  insulin glargine Injectable (LANTUS) 46 Unit(s) SubCutaneous at bedtime  insulin lispro (ADMELOG) corrective regimen sliding scale   SubCutaneous three times a day before meals  insulin lispro (ADMELOG) corrective regimen sliding scale   SubCutaneous at bedtime  insulin lispro Injectable (ADMELOG) 27 Unit(s) SubCutaneous three times a day before meals  valGANciclovir 450 milliGRAM(s) Oral daily      PHYSICAL EXAM:  VITALS: T(C): 37.1 (01-04-22 @ 12:38)  T(F): 98.7 (01-04-22 @ 12:38), Max: 98.8 (01-03-22 @ 19:22)  HR: 84 (01-04-22 @ 12:38) (81 - 92)  BP: 133/78 (01-04-22 @ 12:38) (123/63 - 173/81)  RR:  (19 - 19)  SpO2:  (93% - 97%)  Wt(kg): --  GENERAL: Male laying in bed in NAD  Abdomen: Soft, nontender, non distended, central adiposity  Extremities: Warm, no edema in all 4 exts  NEURO: A&O X3.     LABS:  POCT Blood Glucose.: 183 mg/dL (01-04-22 @ 11:52)  POCT Blood Glucose.: 155 mg/dL (01-04-22 @ 07:49)  POCT Blood Glucose.: 211 mg/dL (01-03-22 @ 21:16)  POCT Blood Glucose.: 252 mg/dL (01-03-22 @ 16:28)  POCT Blood Glucose.: 238 mg/dL (01-03-22 @ 11:53)  POCT Blood Glucose.: 505 mg/dL (01-03-22 @ 07:33)  POCT Blood Glucose.: 514 mg/dL (01-03-22 @ 07:31)  POCT Blood Glucose.: 317 mg/dL (01-02-22 @ 21:20)  POCT Blood Glucose.: 236 mg/dL (01-02-22 @ 16:24)  POCT Blood Glucose.: 221 mg/dL (01-02-22 @ 11:14)  POCT Blood Glucose.: 277 mg/dL (01-02-22 @ 08:11)  POCT Blood Glucose.: 352 mg/dL (01-01-22 @ 21:15)  POCT Blood Glucose.: 248 mg/dL (01-01-22 @ 16:15)                            8.5    2.88  )-----------( 182      ( 04 Jan 2022 07:25 )             26.8       01-04    138  |  104  |  46<H>  ----------------------------<  139<H>  4.3   |  21<L>  |  1.83<H>    EGFR if non : 38<L>    Ca    8.5      01-04  Mg     1.9     01-03  Phos  2.2     01-03    TPro  5.9<L>  /  Alb  3.0<L>  /  TBili  0.2  /  DBili  x   /  AST  42<H>  /  ALT  156<H>  /  AlkPhos  285<H>  01-04    A1C with Estimated Average Glucose Result: 10.1 % (12-21-21 @ 16:29)      Estimated Average Glucose: 243 mg/dL (12-21-21 @ 16:29)

## 2022-01-04 NOTE — PROGRESS NOTE ADULT - PROBLEM SELECTOR PLAN 4
-BP goal <130/80  -Variable BP readings while in hospital  -On Nifedipine at home  -Please follow BP readings and restart med as needed.  -Manage per primary team

## 2022-01-04 NOTE — PROGRESS NOTE ADULT - ASSESSMENT
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 Diff (12/2020), DM, MGUS, and HFpEF, presenting for unwitnessed fall. Currently await rehab placement. Recent CT chest showed possible worsening of COVID PNA, currently on solumedrol. 67 y/o M PMHx decompensated JEAN cirrhosis s/p DDLT (07/2020, c/b post-op VRE bacteremia), prior ESBL e.coli prostate abscess, R heel OM (12/2020), C. Diff (12/2020), DM, MGUS, and HFpEF, presenting for unwitnessed fall. Currently await rehab placement. Recent CT chest showed possible worsening of COVID PNA, currently on dexamethasone.

## 2022-01-04 NOTE — PROGRESS NOTE ADULT - ATTENDING COMMENTS
Awaiting for [facility] placement.  currently hemodynamically stable       Faustina Souzare   HOspitalist    65 y/o M PMHx JEAN cirrhosis s/p DDLT (07/2020, c/b post-op VRE bacteremia), prior ESBL e.coli prostate abscess, R heel OM (12/2020), C. Diff (12/2020), DM 2, MGUS, and HFpEF,  admitted s/p unwitnessed fall. Patient fell on 12/19/21 at around 3:30 pm. Estimates that he was on the floor for roughly 2-3 minutes and was able to call out to son for assistance.  He endorsed cough and sore throat on admission.   In ED, patient initial vitals were 103/55, 88 HR, 16 RR, 37.1 C, 96 O2 RA   Neg CT head .  Patient was found to have UA consistent with UTI and was started on empiric cefepime per transplant ID recs but urine culture (obtained before antibiotic administration) was not consistent with UTI. Patient also had slightly downtrending H/H and was found to have melenotic stool with positive FOBT. Hepatology recommended no scope at this time and melena resolved while H/H was stable. Patient was also having cough and sore throat with good saturation on room air and was found to be Covid positive and CT chest findings were concerning for possible beginnings of multifocal pneumonia. Pulmonology was consulted given question of if the CT chest findings were more likely from covid or side effect of everolimus but concluded that this could not be assessed from imaging alone and recommended agaisnt bronchoscopy at this time. Pt completed remdesivir and monoclonal antibioties, and was placed on dexamethasone 12/29-1/7 with improvement of SPo2 as pt later developped Dyspnea and was on O2 supplement via NC which was then DC.   LFT was seen to be uptrending and liverbiopsy was by IR done for concern of rejection. 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 LFT are now down trending.   PCP prophylaxis changed from bactrim to atovaquone for LFTs as per Transplant team. Endocrine consulted for steroid induced hyperglycemia and glucose is now improving .  Repeat DD was done which showed uptrending DD , and his has no dyspnea and maintained SPO2 on RA , so will get lower ext doppler and place pt on ? Lovenox BID .  Will hold of on CTA for now but if dyspneic or hemodynamic change , please order CTA of chest .  GOal is [facility] once stable     Faustina [facility]

## 2022-01-04 NOTE — PROGRESS NOTE ADULT - PROBLEM SELECTOR PLAN 2
-Steroid therapy with Dexa for x 10 days> then Prednisone 5mg daily maintenance  -Please contac endo team with any chages on steroid doses.

## 2022-01-04 NOTE — PROGRESS NOTE ADULT - PROBLEM SELECTOR PLAN 1
-Test BG ac and hs  -C/w Lantus 46 units qhs for now. Will need to decrease dose once back on low Prednisone dose  -C/w Admelog 27 units TID pre-meal for now. Will adjust as needed. Pt aware of high dose and the need to eat if he receives insulin. Pt verbalized understanding  - C/w moderate pre-meal Admelog correction scale ac and hs  - RD eval to evaluate home diet and address pt request for salt with meals  Discharge  - Plan TBD. Likely basal/bolus. Needs to improve A1C levels.   -F/u with pvt endo Dr Kevan Foreman benefit from Freestyle Nick 2 to keep close BG monitoring at home.

## 2022-01-05 LAB
ALBUMIN SERPL ELPH-MCNC: 2.9 G/DL — LOW (ref 3.3–5)
ALP SERPL-CCNC: 261 U/L — HIGH (ref 40–120)
ALT FLD-CCNC: 147 U/L — HIGH (ref 10–45)
ANION GAP SERPL CALC-SCNC: 12 MMOL/L — SIGNIFICANT CHANGE UP (ref 5–17)
AST SERPL-CCNC: 36 U/L — SIGNIFICANT CHANGE UP (ref 10–40)
BILIRUB SERPL-MCNC: 0.2 MG/DL — SIGNIFICANT CHANGE UP (ref 0.2–1.2)
BUN SERPL-MCNC: 45 MG/DL — HIGH (ref 7–23)
CALCIUM SERPL-MCNC: 8.7 MG/DL — SIGNIFICANT CHANGE UP (ref 8.4–10.5)
CHLORIDE SERPL-SCNC: 103 MMOL/L — SIGNIFICANT CHANGE UP (ref 96–108)
CO2 SERPL-SCNC: 20 MMOL/L — LOW (ref 22–31)
CREAT SERPL-MCNC: 1.76 MG/DL — HIGH (ref 0.5–1.3)
EVEROLIMUS, WHOLE BLOOD RESULT: 16.2 NG/ML — HIGH (ref 3–8)
EVEROLIMUS, WHOLE BLOOD RESULT: 8.1 NG/ML — HIGH (ref 3–8)
GLUCOSE SERPL-MCNC: 231 MG/DL — HIGH (ref 70–99)
HCT VFR BLD CALC: 26.6 % — LOW (ref 39–50)
HGB BLD-MCNC: 8.4 G/DL — LOW (ref 13–17)
MAGNESIUM SERPL-MCNC: 1.9 MG/DL — SIGNIFICANT CHANGE UP (ref 1.6–2.6)
MCHC RBC-ENTMCNC: 26.8 PG — LOW (ref 27–34)
MCHC RBC-ENTMCNC: 31.6 GM/DL — LOW (ref 32–36)
MCV RBC AUTO: 84.7 FL — SIGNIFICANT CHANGE UP (ref 80–100)
NRBC # BLD: 0 /100 WBCS — SIGNIFICANT CHANGE UP (ref 0–0)
PHOSPHATE SERPL-MCNC: 2.6 MG/DL — SIGNIFICANT CHANGE UP (ref 2.5–4.5)
PLATELET # BLD AUTO: 176 K/UL — SIGNIFICANT CHANGE UP (ref 150–400)
POTASSIUM SERPL-MCNC: 5.1 MMOL/L — SIGNIFICANT CHANGE UP (ref 3.5–5.3)
POTASSIUM SERPL-SCNC: 5.1 MMOL/L — SIGNIFICANT CHANGE UP (ref 3.5–5.3)
PROT SERPL-MCNC: 5.8 G/DL — LOW (ref 6–8.3)
RBC # BLD: 3.14 M/UL — LOW (ref 4.2–5.8)
RBC # FLD: 15.6 % — HIGH (ref 10.3–14.5)
SODIUM SERPL-SCNC: 135 MMOL/L — SIGNIFICANT CHANGE UP (ref 135–145)
WBC # BLD: 3.29 K/UL — LOW (ref 3.8–10.5)
WBC # FLD AUTO: 3.29 K/UL — LOW (ref 3.8–10.5)

## 2022-01-05 PROCEDURE — 99232 SBSQ HOSP IP/OBS MODERATE 35: CPT

## 2022-01-05 PROCEDURE — 99232 SBSQ HOSP IP/OBS MODERATE 35: CPT | Mod: GC

## 2022-01-05 PROCEDURE — 93970 EXTREMITY STUDY: CPT | Mod: 26

## 2022-01-05 RX ORDER — INSULIN LISPRO 100/ML
29 VIAL (ML) SUBCUTANEOUS
Refills: 0 | Status: DISCONTINUED | OUTPATIENT
Start: 2022-01-05 | End: 2022-01-06

## 2022-01-05 RX ORDER — ENOXAPARIN SODIUM 100 MG/ML
100 INJECTION SUBCUTANEOUS
Refills: 0 | Status: DISCONTINUED | OUTPATIENT
Start: 2022-01-05 | End: 2022-01-06

## 2022-01-05 RX ADMIN — ATOVAQUONE 1500 MILLIGRAM(S): 750 SUSPENSION ORAL at 11:53

## 2022-01-05 RX ADMIN — EVEROLIMUS 3 MILLIGRAM(S): 10 TABLET ORAL at 05:18

## 2022-01-05 RX ADMIN — ENOXAPARIN SODIUM 100 MILLIGRAM(S): 100 INJECTION SUBCUTANEOUS at 17:19

## 2022-01-05 RX ADMIN — Medication 500000 UNIT(S): at 17:18

## 2022-01-05 RX ADMIN — Medication 4: at 11:51

## 2022-01-05 RX ADMIN — Medication 6 MILLIGRAM(S): at 05:20

## 2022-01-05 RX ADMIN — EVEROLIMUS 3 MILLIGRAM(S): 10 TABLET ORAL at 17:18

## 2022-01-05 RX ADMIN — PANTOPRAZOLE SODIUM 40 MILLIGRAM(S): 20 TABLET, DELAYED RELEASE ORAL at 05:20

## 2022-01-05 RX ADMIN — Medication 4: at 07:52

## 2022-01-05 RX ADMIN — Medication 29 UNIT(S): at 16:45

## 2022-01-05 RX ADMIN — PANTOPRAZOLE SODIUM 40 MILLIGRAM(S): 20 TABLET, DELAYED RELEASE ORAL at 17:19

## 2022-01-05 RX ADMIN — Medication 500000 UNIT(S): at 11:52

## 2022-01-05 RX ADMIN — VALGANCICLOVIR 450 MILLIGRAM(S): 450 TABLET, FILM COATED ORAL at 11:53

## 2022-01-05 RX ADMIN — ENOXAPARIN SODIUM 40 MILLIGRAM(S): 100 INJECTION SUBCUTANEOUS at 05:21

## 2022-01-05 RX ADMIN — Medication 27 UNIT(S): at 07:53

## 2022-01-05 RX ADMIN — ATORVASTATIN CALCIUM 40 MILLIGRAM(S): 80 TABLET, FILM COATED ORAL at 22:28

## 2022-01-05 RX ADMIN — Medication 29 UNIT(S): at 11:51

## 2022-01-05 RX ADMIN — INSULIN GLARGINE 46 UNIT(S): 100 INJECTION, SOLUTION SUBCUTANEOUS at 22:27

## 2022-01-05 RX ADMIN — Medication 500000 UNIT(S): at 05:20

## 2022-01-05 RX ADMIN — Medication 2: at 16:44

## 2022-01-05 NOTE — PROGRESS NOTE ADULT - PROBLEM SELECTOR PLAN 3
CT chest concerning for possible beginnings of multifocal pneumonia. May be the beginnings of covid pneumonia vs everolimus side effect  - See above (coronavirus)  - Pulm consult: are lung findings secondary to covid or everolimus side effect? Unable to be determined by imaging  - S/P Empiric antibiotics for five days (ceftriaxone 12/21 - 12/25) per ID  - CT chest showed possibly worsening COVID PNA CT chest concerning for possible beginnings of multifocal pneumonia due to covid vs everolimus side effect. Improved.   - Pulm consult: are lung findings secondary to covid or everolimus side effect? Unable to be determined by imaging  - S/P Empiric antibiotics for five days (ceftriaxone 12/21 - 12/25) per ID

## 2022-01-05 NOTE — PROGRESS NOTE ADULT - ATTENDING COMMENTS
65 y/o M PMHx JEAN cirrhosis s/p DDLT (07/2020, c/b post-op VRE bacteremia), prior ESBL e.coli prostate abscess, R heel OM (12/2020), C. Diff (12/2020), DM 2, MGUS, and HFpEF,  admitted s/p unwitnessed fall.     COVID PNA  - Pulmonology was consulted given question of if the CT chest findings were more likely from covid or side effect of everolimus but concluded that this could not be assessed from imaging alone and recommended against bronchoscopy at this time.  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   -LFT are now down trending.    Liver Biochemical Testing Trend:  Aspartate Aminotransferase (AST/SGOT): 36 (01-05-22 @ 06:05)  Alanine Aminotransferase (ALT/SGPT): 147 (01-05-22 @ 06:05)  Aspartate Aminotransferase (AST/SGOT): 42 (01-04-22 @ 07:24)  Alanine Aminotransferase (ALT/SGPT): 156 (01-04-22 @ 07:24)       d/c planning

## 2022-01-05 NOTE — PROGRESS NOTE ADULT - PROBLEM SELECTOR PLAN 11
Home insulin regimen 12 lantus and 10 humalog premeal. A1c 10.1.  -now on increasing insulin requirements due to steroid induced hyperglycemia  - Endocrine consulted Home insulin regimen 12 lantus and 10 humalog premeal. A1c 10.1.  -now on increasing insulin requirements due to steroid induced hyperglycemia  - Endocrine is following  -Lantus 46 and admelog 27 TID

## 2022-01-05 NOTE — PROGRESS NOTE ADULT - ATTENDING COMMENTS
Metabolic syndrome, obesity, JEAN cirrhosis s/p liver transplant 7/2/20 w/ CKD on everolimus, MMF, pred 5 mg daily here with 1 month of cough found to have bilateral pulmonary infiltrates and COVID+. Not requiring supplemental oxygen, afebrile, and not short of breath.    Concern for everolimus causing non-infectious pneumonitis vs COVID although improvement in symptoms while on everolimus suggests COVID-19 pneumonia.  Recommend continue to hold MMF and continuing EVR.  Decrease EVR from 4/4 to 3/3 based on high trough levels although still unclear if they are really adequate trough levels.  Liver biopsy for elevated liver enzymes reported inconclusive from last week. I suspect that is related more to Bactrim that was started around 12/20.  bactrim switched kaela mepron 1/3/21, enzyme improving.  Needs follow-up CT chest in 1-2 months.   Endocrinology increased insulin on dexamethasone yesterday now with better sugar control.  Will return to prednisone 5 mg daily after dexamethasone finishes.  Needs ID f/u next week. Will email for transplant f/u next week as well  Pending going to [facility].

## 2022-01-05 NOTE — PROGRESS NOTE ADULT - PROBLEM SELECTOR PLAN 5
H/H slowly downtrending. Per nursing, patient had "dark brown" stools. Unfortunately sample was not saved so could not assess. Previous endoscopy was done roughly 1 year ago for melena and was shown to have varices and bleeding. Stable. No additional melena  - Blood consent in chart  - Active type and screen  - trend CBC  - pRBC PRN when hgb less than 7  - Gi prophlyaxis: protonix 40 q12   - FOBT positive  - Hep not planning in scoping currently H/H slowly downtrending. Per nursing, patient had "dark brown" stools. Unfortunately sample was not saved so could not assess. Previous endoscopy was done roughly 1 year ago for melena and was shown to have varices and bleeding. Stable. No additional melena  - transfuse if Hgb < 7  - Gi prophlyaxis: protonix 40 q12   - Hep not planning in scoping currently

## 2022-01-05 NOTE — PROGRESS NOTE ADULT - ASSESSMENT
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 (course c/b VRE bacteremia, CNI toxicity to both tacro and cyclosporine; switched to everolimus 7/27/20), HLD, obesity, HFpEF presenting w/ fall    # elevated liver enzymes - newly elevated AST/ALT 12/28/2, unclear etiology, no abd pain, nl bili, s/p liver biopsy 12/30/21 w/o evidence of rejection. DDx includes COVID ischemic injury (less likely), DILI (possibly 2/2 bactrim started 12/20/21); bactrim discontinued 1/3/21, now w/ downtrending liver enzymes  # fall - likely 2/2 pulmonary symptoms; trop negative, TTE (poor study) negative, no events on tele, no EEG, no brain MRI, plan for outpt follow up  # pulmonary symptoms - patient w/ 1+ month of upper respiratory symptoms, of cough. Presented to ED 11/19/21 with negative workup; this current presentation now found to be COVID positive w/ CT chest showing patchy groundglass opacities. DDx for symptoms include COVID (though had symptoms of cough 1 month ago) vs everolimus pneumonitis. 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 Patient was seen by pulmonary, unable to differentiate between everolimus pneumonitis and COVID. Case was reviewed with radiology - believes that CT findings more consistent with COVID (patchy ground glass infiltrates) and not everolimus pneumonitis (no fibrosis). Would recommend treating for COVID (remdesivir) and continuing current immunosuppression with plan to re-image in a short interval. If patient's respiratory symptoms worsen, will readdress role of holding everolimus and possibly higher doses of steroids for pneumonitis.     # DM - poorly controlled DM, A1c 10.1, better FSG since 1/3/21    # anemia - patient w/ acute on chronic anemia, had dark stools, though unclear if melena (green around edges) and on iron; will continue w/ supportive management, iron supplementation, transfusions prn, no plans for endoscopic evaluation at this time    Recommendations:  - continue to hold bactrim, please c/w atovaquone 1500mg qd while on higher dose of steroids (spoke w/ transplant ID and ok with switch), can be stopped once dexamethasone is stopped   - decrease everolimus to 3mg q12  - stable for discharge from hepatology perspective   - repeat CT chest in 1-2 months as outpatient   - continue to hold cellcept, will be restarted as outpatient    - c/w dexamethasone 6 PO qd per ID  - glycemic control per endo  - c/w valcyte   - s/p remdesivir   - pantropazole 40mg PO qd  - trend CMP, INR  - rest of care per primary team    Hepatology will continue to follow.     Charles Salgado, PGY5  Gastroenterology/Hepatology Fellow  Available on [employer] Teams  [phone] ([employer] Short Range Pager)  [phone] (Long Range Pager)    After 5pm, please contact the on-call GI fellow. [phone]

## 2022-01-05 NOTE — HOSPICE CARE NOTE - CONVESATION DETAILS
[facility] unable to accept this referral. Message left  for CM covering today at [phone].   "At this time, due to critical shortage staffing in the area, we are unable to provide the best services needed for the patient in question. We suggest the referral sent to other hospices if hospice is the patients/families desired d/c plan."

## 2022-01-05 NOTE — PROGRESS NOTE ADULT - PROBLEM SELECTOR PLAN 2
Cough and sore throat for the past few days. RVP positive for covid at this visit  - Currently in PICU (Covid unit)  - satting well on room air. Desats at night 2/2 SHENG. Refused cpap  - cough drops PRN  - s/p remdesivir (12/20 - 12/25 )  - s/p monoclonal antibody infusion (12/20)  - CTM  - c/w dexamethasone 10d (12/29-1/7) Cough and sore throat for the past few days. RVP positive for covid at this visit  - satting well on room air. Desats at night 2/2 SHENG. Refused cpap  - cough drops PRN  - s/p remdesivir (12/20 - 12/25 )  - s/p monoclonal antibody infusion (12/20)  - c/w dexamethasone 10d (12/29-1/7)  -f/u Doppler venous

## 2022-01-05 NOTE — PROGRESS NOTE ADULT - PROBLEM SELECTOR PLAN 2
-Steroid therapy with Dexa for x 10 days> then Prednisone 5mg daily maintenance  -Please contac endo team with any chages on steroid doses.   -Last dose of dexa for 1/7/22 per EMR. Will need to decrease insulin doses according to BG and return to Prednisone 5mg daily.

## 2022-01-05 NOTE — PROGRESS NOTE ADULT - ASSESSMENT
67 y/o M PMHx decompensated JEAN cirrhosis s/p DDLT (07/2020, c/b post-op VRE bacteremia), prior ESBL e.coli prostate abscess, R heel OM (12/2020), C. Diff (12/2020), DM, MGUS, and HFpEF, presenting for unwitnessed fall. Currently await rehab placement. Recent CT chest showed possible worsening of COVID PNA, currently on dexamethasone.

## 2022-01-05 NOTE — PROGRESS NOTE ADULT - PROBLEM SELECTOR PLAN 4
Urinalysis consistent with UTI.  - Urine culture <10,000 normal urogenital ninfa Urinalysis consistent with UTI.  - Urine culture <10,000 normal urogenital ninfa  -s/p Ceftriaxone x 5 days O-Z Plasty Text: The defect edges were debeveled with a #15 scalpel blade.  Given the location of the defect, shape of the defect and the proximity to free margins an O-Z plasty (double transposition flap) was deemed most appropriate.  Using a sterile surgical marker, the appropriate transposition flaps were drawn incorporating the defect and placing the expected incisions within the relaxed skin tension lines where possible.    The area thus outlined was incised deep to adipose tissue with a #15 scalpel blade.  The skin margins were undermined to an appropriate distance in all directions utilizing iris scissors.  Hemostasis was achieved with electrocautery.  The flaps were then transposed into place, one clockwise and the other counterclockwise, and anchored with interrupted buried subcutaneous sutures.

## 2022-01-05 NOTE — PROGRESS NOTE ADULT - PROBLEM SELECTOR PLAN 1
-Test BG ac and hs  -C/w Lantus 46 units qhs for now. Will need to decrease dose once back on low Prednisone dose  -Change Admelog dose to 29 units TID pre-meal for now. Will adjust as needed. Pt aware of high dose and the need to eat if he receives insulin. Pt verbalized understanding  - C/w moderate pre-meal Admelog correction scale ac and hs  - RD eval to evaluate home diet and address pt request for salt with meals  -Contact endo team with any changes on steroid therapy. Due for last dose 1/7/21  Discharge  - Plan TBD. Likely basal/bolus. Needs to improve A1C levels.   -F/u with pvt endo Dr Mathur  -Ramez benefit from Freestyle Nick 2 to keep close BG monitoring once pt goes home.

## 2022-01-05 NOTE — PROGRESS NOTE ADULT - PROBLEM SELECTOR PLAN 7
- Transplant hepatology recs appreciated  - Transplant surgery recs appreciated  - Transplant ID recs appreciated  - c/w everolimus 3mg q12h  - hold cellcept in the setting of ongoing COVID infection  - c/w dexamethasone 6 PO qd  - c/w bactrim and valcyte - Transplant hepatology recs appreciated  - Transplant surgery recs appreciated  - Transplant ID recs appreciated  - c/w everolimus 3mg q12h  - hold cellcept in the setting of ongoing COVID infection, f/u outpatient   - c/w dexamethasone 6 PO qd  - c/w bactrim and valcyte

## 2022-01-05 NOTE — PROGRESS NOTE ADULT - SUBJECTIVE AND OBJECTIVE BOX
PROGRESS NOTE:   Authored by Rahel Dotson MD     Patient is a 66y old  Male who presents with a chief complaint of Fall (05 Jan 2022 09:39)      SUBJECTIVE / OVERNIGHT EVENTS:    ADDITIONAL REVIEW OF SYSTEMS:    MEDICATIONS  (STANDING):  atorvastatin 40 milliGRAM(s) Oral at bedtime  atovaquone  Suspension 1500 milliGRAM(s) Oral daily  dexAMETHasone     Tablet 6 milliGRAM(s) Oral daily  dextrose 40% Gel 15 Gram(s) Oral once  dextrose 5%. 1000 milliLiter(s) (50 mL/Hr) IV Continuous <Continuous>  dextrose 5%. 1000 milliLiter(s) (100 mL/Hr) IV Continuous <Continuous>  dextrose 50% Injectable 25 Gram(s) IV Push once  dextrose 50% Injectable 12.5 Gram(s) IV Push once  dextrose 50% Injectable 25 Gram(s) IV Push once  enoxaparin Injectable 40 milliGRAM(s) SubCutaneous every 12 hours  everolimus (ZORTRESS) 3 milliGRAM(s) Oral two times a day  glucagon  Injectable 1 milliGRAM(s) IntraMuscular once  insulin glargine Injectable (LANTUS) 46 Unit(s) SubCutaneous at bedtime  insulin lispro (ADMELOG) corrective regimen sliding scale   SubCutaneous three times a day before meals  insulin lispro (ADMELOG) corrective regimen sliding scale   SubCutaneous at bedtime  insulin lispro Injectable (ADMELOG) 29 Unit(s) SubCutaneous three times a day before meals  nystatin    Suspension 628870 Unit(s) Oral four times a day  pantoprazole    Tablet 40 milliGRAM(s) Oral two times a day  valGANciclovir 450 milliGRAM(s) Oral daily    MEDICATIONS  (PRN):  benzocaine 15 mG/menthol 3.6 mG (Sugar-Free) Lozenge 1 Lozenge Oral every 6 hours PRN Sore Throat  benzonatate 100 milliGRAM(s) Oral every 8 hours PRN Cough      CAPILLARY BLOOD GLUCOSE      POCT Blood Glucose.: 225 mg/dL (05 Jan 2022 07:47)  POCT Blood Glucose.: 219 mg/dL (04 Jan 2022 21:54)  POCT Blood Glucose.: 187 mg/dL (04 Jan 2022 17:06)  POCT Blood Glucose.: 183 mg/dL (04 Jan 2022 11:52)    I&O's Summary    04 Jan 2022 07:01  -  05 Jan 2022 07:00  --------------------------------------------------------  IN: 400 mL / OUT: 1200 mL / NET: -800 mL    05 Jan 2022 07:01  -  05 Jan 2022 10:30  --------------------------------------------------------  IN: 300 mL / OUT: 1500 mL / NET: -1200 mL        PHYSICAL EXAM:  Vital Signs Last 24 Hrs  T(C): 36.7 (05 Jan 2022 04:14), Max: 37.1 (04 Jan 2022 12:38)  T(F): 98.1 (05 Jan 2022 04:14), Max: 98.7 (04 Jan 2022 12:38)  HR: 86 (05 Jan 2022 04:14) (84 - 89)  BP: 141/75 (05 Jan 2022 04:14) (133/70 - 153/81)  BP(mean): --  RR: 17 (05 Jan 2022 04:14) (17 - 19)  SpO2: 95% (05 Jan 2022 04:14) (95% - 97%)    GENERAL: No acute distress, well-developed  HEAD:  Atraumatic, Normocephalic  EYES: EOMI, PERRLA, conjunctiva and sclera clear  NECK: Supple, no lymphadenopathy, no JVD  CHEST/LUNG: CTAB; No wheezes, rales, or rhonchi  HEART: Regular rate and rhythm; No murmurs, rubs, or gallops  ABDOMEN: Soft, non-tender, non-distended; normal bowel sounds, no organomegaly  EXTREMITIES:  2+ peripheral pulses b/l, No clubbing, cyanosis, or edema  NEUROLOGY: A&O x 3, no focal deficits  SKIN: No rashes or lesions    LABS:                        8.4    3.29  )-----------( 176      ( 05 Jan 2022 06:06 )             26.6     01-05    135  |  103  |  45<H>  ----------------------------<  231<H>  5.1   |  20<L>  |  1.76<H>    Ca    8.7      05 Jan 2022 06:05  Phos  2.6     01-05  Mg     1.9     01-05    TPro  5.8<L>  /  Alb  2.9<L>  /  TBili  0.2  /  DBili  x   /  AST  36  /  ALT  147<H>  /  AlkPhos  261<H>  01-05    PT/INR - ( 04 Jan 2022 07:24 )   PT: 11.8 sec;   INR: 0.98 ratio         PTT - ( 04 Jan 2022 07:24 )  PTT:30.7 sec            RADIOLOGY & ADDITIONAL TESTS:  Results Reviewed:   Imaging Personally Reviewed:  Electrocardiogram Personally Reviewed:    COORDINATION OF CARE:  Care Discussed with Consultants/Other Providers [Y/N]:  Prior or Outpatient Records Reviewed [Y/N]:   PROGRESS NOTE:   Authored by Rahel Dotson MD     Patient is a 66y old  Male who presents with a chief complaint of Fall (05 Jan 2022 09:39)      SUBJECTIVE / OVERNIGHT EVENTS:     ADDITIONAL REVIEW OF SYSTEMS:    MEDICATIONS  (STANDING):  atorvastatin 40 milliGRAM(s) Oral at bedtime  atovaquone  Suspension 1500 milliGRAM(s) Oral daily  dexAMETHasone     Tablet 6 milliGRAM(s) Oral daily  dextrose 40% Gel 15 Gram(s) Oral once  dextrose 5%. 1000 milliLiter(s) (50 mL/Hr) IV Continuous <Continuous>  dextrose 5%. 1000 milliLiter(s) (100 mL/Hr) IV Continuous <Continuous>  dextrose 50% Injectable 25 Gram(s) IV Push once  dextrose 50% Injectable 12.5 Gram(s) IV Push once  dextrose 50% Injectable 25 Gram(s) IV Push once  enoxaparin Injectable 40 milliGRAM(s) SubCutaneous every 12 hours  everolimus (ZORTRESS) 3 milliGRAM(s) Oral two times a day  glucagon  Injectable 1 milliGRAM(s) IntraMuscular once  insulin glargine Injectable (LANTUS) 46 Unit(s) SubCutaneous at bedtime  insulin lispro (ADMELOG) corrective regimen sliding scale   SubCutaneous three times a day before meals  insulin lispro (ADMELOG) corrective regimen sliding scale   SubCutaneous at bedtime  insulin lispro Injectable (ADMELOG) 29 Unit(s) SubCutaneous three times a day before meals  nystatin    Suspension 599233 Unit(s) Oral four times a day  pantoprazole    Tablet 40 milliGRAM(s) Oral two times a day  valGANciclovir 450 milliGRAM(s) Oral daily    MEDICATIONS  (PRN):  benzocaine 15 mG/menthol 3.6 mG (Sugar-Free) Lozenge 1 Lozenge Oral every 6 hours PRN Sore Throat  benzonatate 100 milliGRAM(s) Oral every 8 hours PRN Cough      CAPILLARY BLOOD GLUCOSE      POCT Blood Glucose.: 225 mg/dL (05 Jan 2022 07:47)  POCT Blood Glucose.: 219 mg/dL (04 Jan 2022 21:54)  POCT Blood Glucose.: 187 mg/dL (04 Jan 2022 17:06)  POCT Blood Glucose.: 183 mg/dL (04 Jan 2022 11:52)    I&O's Summary    04 Jan 2022 07:01  -  05 Jan 2022 07:00  --------------------------------------------------------  IN: 400 mL / OUT: 1200 mL / NET: -800 mL    05 Jan 2022 07:01  -  05 Jan 2022 10:30  --------------------------------------------------------  IN: 300 mL / OUT: 1500 mL / NET: -1200 mL        PHYSICAL EXAM:  Vital Signs Last 24 Hrs  T(C): 36.7 (05 Jan 2022 04:14), Max: 37.1 (04 Jan 2022 12:38)  T(F): 98.1 (05 Jan 2022 04:14), Max: 98.7 (04 Jan 2022 12:38)  HR: 86 (05 Jan 2022 04:14) (84 - 89)  BP: 141/75 (05 Jan 2022 04:14) (133/70 - 153/81)  BP(mean): --  RR: 17 (05 Jan 2022 04:14) (17 - 19)  SpO2: 95% (05 Jan 2022 04:14) (95% - 97%)    GENERAL: No acute distress, well-developed  HEAD:  Atraumatic, Normocephalic  EYES: EOMI, PERRLA, conjunctiva and sclera clear  NECK: Supple, no lymphadenopathy, no JVD  CHEST/LUNG: CTAB; No wheezes, rales, or rhonchi  HEART: Regular rate and rhythm; No murmurs, rubs, or gallops  ABDOMEN: Soft, non-tender, non-distended; normal bowel sounds, no organomegaly  EXTREMITIES:  2+ peripheral pulses b/l, No clubbing, cyanosis, or edema  NEUROLOGY: A&O x 3, no focal deficits  SKIN: No rashes or lesions    LABS:                        8.4    3.29  )-----------( 176      ( 05 Jan 2022 06:06 )             26.6     01-05    135  |  103  |  45<H>  ----------------------------<  231<H>  5.1   |  20<L>  |  1.76<H>    Ca    8.7      05 Jan 2022 06:05  Phos  2.6     01-05  Mg     1.9     01-05    TPro  5.8<L>  /  Alb  2.9<L>  /  TBili  0.2  /  DBili  x   /  AST  36  /  ALT  147<H>  /  AlkPhos  261<H>  01-05    PT/INR - ( 04 Jan 2022 07:24 )   PT: 11.8 sec;   INR: 0.98 ratio         PTT - ( 04 Jan 2022 07:24 )  PTT:30.7 sec            RADIOLOGY & ADDITIONAL TESTS:  Results Reviewed:   Imaging Personally Reviewed:  Electrocardiogram Personally Reviewed:    COORDINATION OF CARE:  Care Discussed with Consultants/Other Providers [Y/N]:  Prior or Outpatient Records Reviewed [Y/N]:   PROGRESS NOTE:   Authored by Rahel Dotson MD     Patient is a 66y old  Male who presents with a chief complaint of Fall (05 Jan 2022 09:39)      SUBJECTIVE / OVERNIGHT EVENTS:   Patient denied CP, SOB, palpitation, N/V/D, fever, chills.     ADDITIONAL REVIEW OF SYSTEMS:    MEDICATIONS  (STANDING):  atorvastatin 40 milliGRAM(s) Oral at bedtime  atovaquone  Suspension 1500 milliGRAM(s) Oral daily  dexAMETHasone     Tablet 6 milliGRAM(s) Oral daily  dextrose 40% Gel 15 Gram(s) Oral once  dextrose 5%. 1000 milliLiter(s) (50 mL/Hr) IV Continuous <Continuous>  dextrose 5%. 1000 milliLiter(s) (100 mL/Hr) IV Continuous <Continuous>  dextrose 50% Injectable 25 Gram(s) IV Push once  dextrose 50% Injectable 12.5 Gram(s) IV Push once  dextrose 50% Injectable 25 Gram(s) IV Push once  enoxaparin Injectable 40 milliGRAM(s) SubCutaneous every 12 hours  everolimus (ZORTRESS) 3 milliGRAM(s) Oral two times a day  glucagon  Injectable 1 milliGRAM(s) IntraMuscular once  insulin glargine Injectable (LANTUS) 46 Unit(s) SubCutaneous at bedtime  insulin lispro (ADMELOG) corrective regimen sliding scale   SubCutaneous three times a day before meals  insulin lispro (ADMELOG) corrective regimen sliding scale   SubCutaneous at bedtime  insulin lispro Injectable (ADMELOG) 29 Unit(s) SubCutaneous three times a day before meals  nystatin    Suspension 100628 Unit(s) Oral four times a day  pantoprazole    Tablet 40 milliGRAM(s) Oral two times a day  valGANciclovir 450 milliGRAM(s) Oral daily    MEDICATIONS  (PRN):  benzocaine 15 mG/menthol 3.6 mG (Sugar-Free) Lozenge 1 Lozenge Oral every 6 hours PRN Sore Throat  benzonatate 100 milliGRAM(s) Oral every 8 hours PRN Cough      CAPILLARY BLOOD GLUCOSE      POCT Blood Glucose.: 225 mg/dL (05 Jan 2022 07:47)  POCT Blood Glucose.: 219 mg/dL (04 Jan 2022 21:54)  POCT Blood Glucose.: 187 mg/dL (04 Jan 2022 17:06)  POCT Blood Glucose.: 183 mg/dL (04 Jan 2022 11:52)    I&O's Summary    04 Jan 2022 07:01  -  05 Jan 2022 07:00  --------------------------------------------------------  IN: 400 mL / OUT: 1200 mL / NET: -800 mL    05 Jan 2022 07:01  -  05 Jan 2022 10:30  --------------------------------------------------------  IN: 300 mL / OUT: 1500 mL / NET: -1200 mL        PHYSICAL EXAM:  Vital Signs Last 24 Hrs  T(C): 36.7 (05 Jan 2022 04:14), Max: 37.1 (04 Jan 2022 12:38)  T(F): 98.1 (05 Jan 2022 04:14), Max: 98.7 (04 Jan 2022 12:38)  HR: 86 (05 Jan 2022 04:14) (84 - 89)  BP: 141/75 (05 Jan 2022 04:14) (133/70 - 153/81)  BP(mean): --  RR: 17 (05 Jan 2022 04:14) (17 - 19)  SpO2: 95% (05 Jan 2022 04:14) (95% - 97%)    GENERAL: No acute distress, well-developed  HEAD:  Atraumatic, Normocephalic  EYES: EOMI, PERRLA, conjunctiva and sclera clear  NECK: Supple, no lymphadenopathy, no JVD  CHEST/LUNG: CTAB; No wheezes, rales, or rhonchi  HEART: Regular rate and rhythm; No murmurs, rubs, or gallops  ABDOMEN: Soft, non-tender, non-distended; normal bowel sounds, no organomegaly  EXTREMITIES:  2+ peripheral pulses b/l, No clubbing, cyanosis, or edema  NEUROLOGY: A&O x 3, no focal deficits  SKIN: No rashes or lesions    LABS:                        8.4    3.29  )-----------( 176      ( 05 Jan 2022 06:06 )             26.6     01-05    135  |  103  |  45<H>  ----------------------------<  231<H>  5.1   |  20<L>  |  1.76<H>    Ca    8.7      05 Jan 2022 06:05  Phos  2.6     01-05  Mg     1.9     01-05    TPro  5.8<L>  /  Alb  2.9<L>  /  TBili  0.2  /  DBili  x   /  AST  36  /  ALT  147<H>  /  AlkPhos  261<H>  01-05    PT/INR - ( 04 Jan 2022 07:24 )   PT: 11.8 sec;   INR: 0.98 ratio         PTT - ( 04 Jan 2022 07:24 )  PTT:30.7 sec            RADIOLOGY & ADDITIONAL TESTS:  Results Reviewed:   Imaging Personally Reviewed:  Electrocardiogram Personally Reviewed:    COORDINATION OF CARE:  Care Discussed with Consultants/Other Providers [Y/N]:  Prior or Outpatient Records Reviewed [Y/N]:

## 2022-01-05 NOTE — PROGRESS NOTE ADULT - SUBJECTIVE AND OBJECTIVE BOX
Gastroenterology/Hepatology Progress Note      Interval Events:   - no acute events overnight  - everolimus decreased to 3 bid, improving liver enzyme,s Cr 1.8->1.7    Allergies:  codeine (Anaphylaxis)      Hospital Medications:  atorvastatin 40 milliGRAM(s) Oral at bedtime  atovaquone  Suspension 1500 milliGRAM(s) Oral daily  benzocaine 15 mG/menthol 3.6 mG (Sugar-Free) Lozenge 1 Lozenge Oral every 6 hours PRN  benzonatate 100 milliGRAM(s) Oral every 8 hours PRN  dexAMETHasone     Tablet 6 milliGRAM(s) Oral daily  dextrose 40% Gel 15 Gram(s) Oral once  dextrose 5%. 1000 milliLiter(s) IV Continuous <Continuous>  dextrose 5%. 1000 milliLiter(s) IV Continuous <Continuous>  dextrose 50% Injectable 25 Gram(s) IV Push once  dextrose 50% Injectable 12.5 Gram(s) IV Push once  dextrose 50% Injectable 25 Gram(s) IV Push once  enoxaparin Injectable 40 milliGRAM(s) SubCutaneous every 12 hours  everolimus (ZORTRESS) 3 milliGRAM(s) Oral two times a day  glucagon  Injectable 1 milliGRAM(s) IntraMuscular once  insulin glargine Injectable (LANTUS) 46 Unit(s) SubCutaneous at bedtime  insulin lispro (ADMELOG) corrective regimen sliding scale   SubCutaneous at bedtime  insulin lispro (ADMELOG) corrective regimen sliding scale   SubCutaneous three times a day before meals  insulin lispro Injectable (ADMELOG) 27 Unit(s) SubCutaneous three times a day before meals  nystatin    Suspension 768199 Unit(s) Oral four times a day  pantoprazole    Tablet 40 milliGRAM(s) Oral two times a day  valGANciclovir 450 milliGRAM(s) Oral daily        PHYSICAL EXAM:   Vital Signs:  Vital Signs Last 24 Hrs  T(C): 36.7 (05 Jan 2022 04:14), Max: 37.1 (04 Jan 2022 12:38)  T(F): 98.1 (05 Jan 2022 04:14), Max: 98.7 (04 Jan 2022 12:38)  HR: 86 (05 Jan 2022 04:14) (84 - 89)  BP: 141/75 (05 Jan 2022 04:14) (133/70 - 153/81)  BP(mean): --  RR: 17 (05 Jan 2022 04:14) (17 - 19)  SpO2: 95% (05 Jan 2022 04:14) (95% - 97%)  Daily     Daily     GENERAL:  No acute distress  HEENT:  NCAT, no scleral icterus  CHEST: no resp distress  HEART:  RRR  ABDOMEN:  Soft, non-tender, non-distended, normoactive bowel sounds, no masses  EXTREMITIES:  No cyanosis, clubbing, or edema  SKIN:  No rash/erythema/ecchymoses/petechiae/wounds/abscess/warm/dry  NEURO:  Alert and oriented x 3, no asterixis, no tremor    LABS:                        8.4    3.29  )-----------( 176      ( 05 Jan 2022 06:06 )             26.6     Mean Cell Volume: 84.7 fl (01-05-22 @ 06:06)    01-05    135  |  103  |  45<H>  ----------------------------<  231<H>  5.1   |  20<L>  |  1.76<H>    Ca    8.7      05 Jan 2022 06:05  Phos  2.6     01-05  Mg     1.9     01-05    TPro  5.8<L>  /  Alb  2.9<L>  /  TBili  0.2  /  DBili  x   /  AST  36  /  ALT  147<H>  /  AlkPhos  261<H>  01-05    LIVER FUNCTIONS - ( 05 Jan 2022 06:05 )  Alb: 2.9 g/dL / Pro: 5.8 g/dL / ALK PHOS: 261 U/L / ALT: 147 U/L / AST: 36 U/L / GGT: x           PT/INR - ( 04 Jan 2022 07:24 )   PT: 11.8 sec;   INR: 0.98 ratio         PTT - ( 04 Jan 2022 07:24 )  PTT:30.7 sec          Imaging:

## 2022-01-05 NOTE — PROGRESS NOTE ADULT - PROBLEM SELECTOR PLAN 3
C/w atorvastatin 40 milliGRAM(s) Oral at bedtime  -F/u elevated LFT. Managed bu liver tx team  -pt on Rosuvastatin 20mg plus Zetia 10mg and Lovaza PTA>  restart upon discharge to rehab if not contraindicated  -F/u levels as out pt

## 2022-01-05 NOTE — PROGRESS NOTE ADULT - ASSESSMENT
65 y/o M w/h/o uncontrolled T2DM on insulin and chronic low dose of Prednisone PTA (HbA1C 10.1). DM c/b neuropathy, DFU, retinopathy. Also h/o HFpEF, liver transplant in 2020 for JEAN cirrhosis, MGUS, presented s/p fall. Awaiting rehab placement. Found to be COVID positive, on steroids. Endocrine consulted for T2DM with hyperglycemia. BG variable due to variable PO intake and steroid therapy. Hyperglycemic today so will increase premeal insulin to BG goal 100 to 180s. No hypoglycemia.

## 2022-01-05 NOTE — PROGRESS NOTE ADULT - SUBJECTIVE AND OBJECTIVE BOX
DIABETES FOLLOW UP NOTE: Saw pt earlier today  INTERVAL HX: Pt stable, reports tolerating POs with variable BG levels while on present insulin doses due to variable PO intake. No hypoglycemia. Remains on Dexa 6mg. Also on Prednisone 5mg at home after liver tx. No complaints but today pt stated he doesn't use salt with his food even though yesterday he was asking for salt with his meals. Awaiting rehab.       Review of Systems:  General: As above  Cardiovascular: No chest pain, palpitations  Respiratory: No SOB, no cough  GI: No nausea, vomiting, abdominal pain  Endocrine: No polyuria, polydipsia or S&Sx of hypoglycemia    Allergies    codeine (Anaphylaxis)    Intolerances      MEDICATIONS:  atorvastatin 40 milliGRAM(s) Oral at bedtime  atovaquone  Suspension 1500 milliGRAM(s) Oral daily  dexAMETHasone     Tablet 6 milliGRAM(s) Oral daily  everolimus (ZORTRESS) 3 milliGRAM(s) Oral two times a day  insulin glargine Injectable (LANTUS) 46 Unit(s) SubCutaneous at bedtime  insulin lispro (ADMELOG) corrective regimen sliding scale   SubCutaneous at bedtime  insulin lispro (ADMELOG) corrective regimen sliding scale   SubCutaneous three times a day before meals  insulin lispro Injectable (ADMELOG) 29 Unit(s) SubCutaneous three times a day before meals  valGANciclovir 450 milliGRAM(s) Oral daily      PHYSICAL EXAM:  VITALS: T(C): 36.8 (01-05-22 @ 11:14)  T(F): 98.3 (01-05-22 @ 11:14), Max: 98.3 (01-05-22 @ 11:14)  HR: 96 (01-05-22 @ 11:14) (86 - 96)  BP: 116/72 (01-05-22 @ 11:14) (116/72 - 153/81)  RR:  (17 - 17)  SpO2:  (95% - 96%)  Wt(kg): --  GENERAL: Male laying in bed in NAD  Abdomen: Soft, nontender, non distended, central adiposity  Extremities: Warm, no edema in all 4 exts  NEURO: Alert and able to answer questions. Contradicting info given from one day to another about diet.     LABS:  POCT Blood Glucose.: 216 mg/dL (01-05-22 @ 11:22)  POCT Blood Glucose.: 225 mg/dL (01-05-22 @ 07:47)  POCT Blood Glucose.: 219 mg/dL (01-04-22 @ 21:54)  POCT Blood Glucose.: 187 mg/dL (01-04-22 @ 17:06)  POCT Blood Glucose.: 183 mg/dL (01-04-22 @ 11:52)  POCT Blood Glucose.: 155 mg/dL (01-04-22 @ 07:49)  POCT Blood Glucose.: 211 mg/dL (01-03-22 @ 21:16)  POCT Blood Glucose.: 252 mg/dL (01-03-22 @ 16:28)  POCT Blood Glucose.: 238 mg/dL (01-03-22 @ 11:53)  POCT Blood Glucose.: 505 mg/dL (01-03-22 @ 07:33)  POCT Blood Glucose.: 514 mg/dL (01-03-22 @ 07:31)  POCT Blood Glucose.: 317 mg/dL (01-02-22 @ 21:20)  POCT Blood Glucose.: 236 mg/dL (01-02-22 @ 16:24)                            8.4    3.29  )-----------( 176      ( 05 Jan 2022 06:06 )             26.6       01-05    135  |  103  |  45<H>  ----------------------------<  231<H>  5.1   |  20<L>  |  1.76<H>    EGFR if non : 39<L>    Ca    8.7      01-05  Mg     1.9     01-05  Phos  2.6     01-05    TPro  5.8<L>  /  Alb  2.9<L>  /  TBili  0.2  /  DBili  x   /  AST  36  /  ALT  147<H>  /  AlkPhos  261<H>  01-05      A1C with Estimated Average Glucose Result: 10.1 % (12-21-21 @ 16:29)      Estimated Average Glucose: 243 mg/dL (12-21-21 @ 16:29)

## 2022-01-05 NOTE — PROGRESS NOTE ADULT - PROBLEM SELECTOR PLAN 1
Unclear etiology of the fall. Patient denies loss of consciousness. Low suspicion of a cardiogenic cause with unremarkable trops and EKG. Denies mechanical causes. Patient said that he was "out of it" when he fell but did not lose consciousness. He also said that he urinated on the floor but claims this was intentional. Neurologic cause?   - Neurology following  - Orthostatic blood pressures were positive for orthostatic hypotension  - TTE unremarkable  - c/w telemetry  - Infectious work up: Sputum culture, urine culture, blood cultures so far NGTD  - Pt consul-> rehab Unclear etiology of the fall. Patient denies loss of consciousness. Low suspicion of a cardiogenic cause with unremarkable trops and EKG. Denies mechanical causes. Patient said that he was "out of it" when he fell but did not lose consciousness. He also said that he urinated on the floor but claims this was intentional. Neurologic cause?   - Neurology following  - Orthostatic blood pressures were positive for orthostatic hypotension  - TTE unremarkable  - c/w telemetry  - Infectious work up: Sputum culture, urine culture, blood cultures so far NGTD  - Pt consult-> rehab

## 2022-01-06 ENCOUNTER — TRANSCRIPTION ENCOUNTER (OUTPATIENT)
Age: 67
End: 2022-01-06

## 2022-01-06 VITALS
DIASTOLIC BLOOD PRESSURE: 60 MMHG | TEMPERATURE: 98 F | HEART RATE: 92 BPM | SYSTOLIC BLOOD PRESSURE: 121 MMHG | OXYGEN SATURATION: 96 % | RESPIRATION RATE: 18 BRPM

## 2022-01-06 LAB
ANION GAP SERPL CALC-SCNC: 11 MMOL/L — SIGNIFICANT CHANGE UP (ref 5–17)
BASOPHILS # BLD AUTO: 0 K/UL — SIGNIFICANT CHANGE UP (ref 0–0.2)
BASOPHILS NFR BLD AUTO: 0 % — SIGNIFICANT CHANGE UP (ref 0–2)
BUN SERPL-MCNC: 44 MG/DL — HIGH (ref 7–23)
CALCIUM SERPL-MCNC: 8.6 MG/DL — SIGNIFICANT CHANGE UP (ref 8.4–10.5)
CHLORIDE SERPL-SCNC: 106 MMOL/L — SIGNIFICANT CHANGE UP (ref 96–108)
CMV DNA CSF QL NAA+PROBE: SIGNIFICANT CHANGE UP
CO2 SERPL-SCNC: 22 MMOL/L — SIGNIFICANT CHANGE UP (ref 22–31)
CREAT SERPL-MCNC: 1.8 MG/DL — HIGH (ref 0.5–1.3)
EOSINOPHIL # BLD AUTO: 0.05 K/UL — SIGNIFICANT CHANGE UP (ref 0–0.5)
EOSINOPHIL NFR BLD AUTO: 1.7 % — SIGNIFICANT CHANGE UP (ref 0–6)
EVEROLIMUS, WHOLE BLOOD RESULT: 17.2 NG/ML — HIGH (ref 3–8)
GLUCOSE SERPL-MCNC: 142 MG/DL — HIGH (ref 70–99)
HCT VFR BLD CALC: 27.8 % — LOW (ref 39–50)
HGB BLD-MCNC: 8.6 G/DL — LOW (ref 13–17)
LYMPHOCYTES # BLD AUTO: 0.52 K/UL — LOW (ref 1–3.3)
LYMPHOCYTES # BLD AUTO: 17.1 % — SIGNIFICANT CHANGE UP (ref 13–44)
MAGNESIUM SERPL-MCNC: 2 MG/DL — SIGNIFICANT CHANGE UP (ref 1.6–2.6)
MCHC RBC-ENTMCNC: 26.5 PG — LOW (ref 27–34)
MCHC RBC-ENTMCNC: 30.9 GM/DL — LOW (ref 32–36)
MCV RBC AUTO: 85.5 FL — SIGNIFICANT CHANGE UP (ref 80–100)
MONOCYTES # BLD AUTO: 0.05 K/UL — SIGNIFICANT CHANGE UP (ref 0–0.9)
MONOCYTES NFR BLD AUTO: 1.7 % — LOW (ref 2–14)
NEUTROPHILS # BLD AUTO: 2.28 K/UL — SIGNIFICANT CHANGE UP (ref 1.8–7.4)
NEUTROPHILS NFR BLD AUTO: 74.4 % — SIGNIFICANT CHANGE UP (ref 43–77)
PHOSPHATE SERPL-MCNC: 3.3 MG/DL — SIGNIFICANT CHANGE UP (ref 2.5–4.5)
PLATELET # BLD AUTO: 176 K/UL — SIGNIFICANT CHANGE UP (ref 150–400)
POTASSIUM SERPL-MCNC: 4.5 MMOL/L — SIGNIFICANT CHANGE UP (ref 3.5–5.3)
POTASSIUM SERPL-SCNC: 4.5 MMOL/L — SIGNIFICANT CHANGE UP (ref 3.5–5.3)
RBC # BLD: 3.25 M/UL — LOW (ref 4.2–5.8)
RBC # FLD: 16 % — HIGH (ref 10.3–14.5)
SODIUM SERPL-SCNC: 139 MMOL/L — SIGNIFICANT CHANGE UP (ref 135–145)
WBC # BLD: 3.06 K/UL — LOW (ref 3.8–10.5)
WBC # FLD AUTO: 3.06 K/UL — LOW (ref 3.8–10.5)

## 2022-01-06 PROCEDURE — 82272 OCCULT BLD FECES 1-3 TESTS: CPT

## 2022-01-06 PROCEDURE — U0005: CPT

## 2022-01-06 PROCEDURE — P9040: CPT

## 2022-01-06 PROCEDURE — 86923 COMPATIBILITY TEST ELECTRIC: CPT

## 2022-01-06 PROCEDURE — 82728 ASSAY OF FERRITIN: CPT

## 2022-01-06 PROCEDURE — 70450 CT HEAD/BRAIN W/O DYE: CPT | Mod: MA

## 2022-01-06 PROCEDURE — 80169 DRUG ASSAY EVEROLIMUS: CPT

## 2022-01-06 PROCEDURE — 76942 ECHO GUIDE FOR BIOPSY: CPT

## 2022-01-06 PROCEDURE — 82565 ASSAY OF CREATININE: CPT

## 2022-01-06 PROCEDURE — 82962 GLUCOSE BLOOD TEST: CPT

## 2022-01-06 PROCEDURE — 86140 C-REACTIVE PROTEIN: CPT

## 2022-01-06 PROCEDURE — 87449 NOS EACH ORGANISM AG IA: CPT

## 2022-01-06 PROCEDURE — 99285 EMERGENCY DEPT VISIT HI MDM: CPT

## 2022-01-06 PROCEDURE — 83615 LACTATE (LD) (LDH) ENZYME: CPT

## 2022-01-06 PROCEDURE — 83550 IRON BINDING TEST: CPT

## 2022-01-06 PROCEDURE — 99239 HOSP IP/OBS DSCHRG MGMT >30: CPT | Mod: GC

## 2022-01-06 PROCEDURE — 83540 ASSAY OF IRON: CPT

## 2022-01-06 PROCEDURE — 84145 PROCALCITONIN (PCT): CPT

## 2022-01-06 PROCEDURE — 93306 TTE W/DOPPLER COMPLETE: CPT

## 2022-01-06 PROCEDURE — 72170 X-RAY EXAM OF PELVIS: CPT

## 2022-01-06 PROCEDURE — 97530 THERAPEUTIC ACTIVITIES: CPT

## 2022-01-06 PROCEDURE — 85025 COMPLETE CBC W/AUTO DIFF WBC: CPT

## 2022-01-06 PROCEDURE — 99232 SBSQ HOSP IP/OBS MODERATE 35: CPT | Mod: GC

## 2022-01-06 PROCEDURE — 85027 COMPLETE CBC AUTOMATED: CPT

## 2022-01-06 PROCEDURE — 83036 HEMOGLOBIN GLYCOSYLATED A1C: CPT

## 2022-01-06 PROCEDURE — 97116 GAIT TRAINING THERAPY: CPT

## 2022-01-06 PROCEDURE — U0003: CPT

## 2022-01-06 PROCEDURE — 80076 HEPATIC FUNCTION PANEL: CPT

## 2022-01-06 PROCEDURE — 84466 ASSAY OF TRANSFERRIN: CPT

## 2022-01-06 PROCEDURE — 71045 X-RAY EXAM CHEST 1 VIEW: CPT

## 2022-01-06 PROCEDURE — 36415 COLL VENOUS BLD VENIPUNCTURE: CPT

## 2022-01-06 PROCEDURE — 97161 PT EVAL LOW COMPLEX 20 MIN: CPT

## 2022-01-06 PROCEDURE — 93005 ELECTROCARDIOGRAM TRACING: CPT

## 2022-01-06 PROCEDURE — 76705 ECHO EXAM OF ABDOMEN: CPT

## 2022-01-06 PROCEDURE — 81001 URINALYSIS AUTO W/SCOPE: CPT

## 2022-01-06 PROCEDURE — 93975 VASCULAR STUDY: CPT

## 2022-01-06 PROCEDURE — 99232 SBSQ HOSP IP/OBS MODERATE 35: CPT

## 2022-01-06 PROCEDURE — 87635 SARS-COV-2 COVID-19 AMP PRB: CPT

## 2022-01-06 PROCEDURE — 87086 URINE CULTURE/COLONY COUNT: CPT

## 2022-01-06 PROCEDURE — 84100 ASSAY OF PHOSPHORUS: CPT

## 2022-01-06 PROCEDURE — 85730 THROMBOPLASTIN TIME PARTIAL: CPT

## 2022-01-06 PROCEDURE — 85610 PROTHROMBIN TIME: CPT

## 2022-01-06 PROCEDURE — 82550 ASSAY OF CK (CPK): CPT

## 2022-01-06 PROCEDURE — 85379 FIBRIN DEGRADATION QUANT: CPT

## 2022-01-06 PROCEDURE — 0225U NFCT DS DNA&RNA 21 SARSCOV2: CPT

## 2022-01-06 PROCEDURE — 83735 ASSAY OF MAGNESIUM: CPT

## 2022-01-06 PROCEDURE — 94660 CPAP INITIATION&MGMT: CPT

## 2022-01-06 PROCEDURE — 83690 ASSAY OF LIPASE: CPT

## 2022-01-06 PROCEDURE — 88313 SPECIAL STAINS GROUP 2: CPT

## 2022-01-06 PROCEDURE — 80048 BASIC METABOLIC PNL TOTAL CA: CPT

## 2022-01-06 PROCEDURE — 87305 ASPERGILLUS AG IA: CPT

## 2022-01-06 PROCEDURE — 86850 RBC ANTIBODY SCREEN: CPT

## 2022-01-06 PROCEDURE — 87040 BLOOD CULTURE FOR BACTERIA: CPT

## 2022-01-06 PROCEDURE — 47000 NEEDLE BIOPSY OF LIVER PERQ: CPT

## 2022-01-06 PROCEDURE — 36430 TRANSFUSION BLD/BLD COMPNT: CPT

## 2022-01-06 PROCEDURE — 86900 BLOOD TYPING SEROLOGIC ABO: CPT

## 2022-01-06 PROCEDURE — 88307 TISSUE EXAM BY PATHOLOGIST: CPT

## 2022-01-06 PROCEDURE — 87641 MR-STAPH DNA AMP PROBE: CPT

## 2022-01-06 PROCEDURE — 71250 CT THORAX DX C-: CPT | Mod: MA

## 2022-01-06 PROCEDURE — 86901 BLOOD TYPING SEROLOGIC RH(D): CPT

## 2022-01-06 PROCEDURE — 84484 ASSAY OF TROPONIN QUANT: CPT

## 2022-01-06 PROCEDURE — 87640 STAPH A DNA AMP PROBE: CPT

## 2022-01-06 PROCEDURE — 86403 PARTICLE AGGLUT ANTBDY SCRN: CPT

## 2022-01-06 PROCEDURE — 93970 EXTREMITY STUDY: CPT

## 2022-01-06 PROCEDURE — 80053 COMPREHEN METABOLIC PANEL: CPT

## 2022-01-06 RX ORDER — EZETIMIBE 10 MG/1
1 TABLET ORAL
Qty: 0 | Refills: 0 | DISCHARGE
Start: 2022-01-06

## 2022-01-06 RX ORDER — MYCOPHENOLATE MOFETIL 250 MG/1
500 CAPSULE ORAL
Qty: 0 | Refills: 0 | DISCHARGE
Start: 2022-01-06

## 2022-01-06 RX ORDER — ROSUVASTATIN CALCIUM 5 MG/1
1 TABLET ORAL
Qty: 0 | Refills: 0 | DISCHARGE

## 2022-01-06 RX ORDER — INSULIN LISPRO 100/ML
29 VIAL (ML) SUBCUTANEOUS
Qty: 0 | Refills: 0 | DISCHARGE
Start: 2022-01-06

## 2022-01-06 RX ORDER — MYCOPHENOLATE MOFETIL 250 MG/1
500 CAPSULE ORAL
Refills: 0 | Status: DISCONTINUED | OUTPATIENT
Start: 2022-01-06 | End: 2022-01-06

## 2022-01-06 RX ORDER — NYSTATIN 500MM UNIT
5 POWDER (EA) MISCELLANEOUS
Qty: 0 | Refills: 0 | DISCHARGE
Start: 2022-01-06

## 2022-01-06 RX ORDER — ATOVAQUONE 750 MG/5ML
10 SUSPENSION ORAL
Qty: 0 | Refills: 0 | DISCHARGE
Start: 2022-01-06 | End: 2022-01-07

## 2022-01-06 RX ORDER — ATORVASTATIN CALCIUM 80 MG/1
1 TABLET, FILM COATED ORAL
Qty: 0 | Refills: 0 | DISCHARGE
Start: 2022-01-06

## 2022-01-06 RX ORDER — ROSUVASTATIN CALCIUM 5 MG/1
1 TABLET ORAL
Qty: 0 | Refills: 0 | DISCHARGE
Start: 2022-01-06

## 2022-01-06 RX ORDER — INSULIN GLARGINE 100 [IU]/ML
12 INJECTION, SOLUTION SUBCUTANEOUS
Qty: 0 | Refills: 0 | DISCHARGE

## 2022-01-06 RX ORDER — APIXABAN 2.5 MG/1
2 TABLET, FILM COATED ORAL
Qty: 28 | Refills: 0
Start: 2022-01-06 | End: 2022-01-12

## 2022-01-06 RX ORDER — DEXAMETHASONE 0.5 MG/5ML
1 ELIXIR ORAL
Qty: 0 | Refills: 0 | DISCHARGE
Start: 2022-01-06 | End: 2022-01-07

## 2022-01-06 RX ORDER — MYCOPHENOLATE MOFETIL 250 MG/1
1 CAPSULE ORAL
Qty: 0 | Refills: 0 | DISCHARGE

## 2022-01-06 RX ORDER — EVEROLIMUS 10 MG/1
3 TABLET ORAL
Qty: 0 | Refills: 0 | DISCHARGE
Start: 2022-01-06

## 2022-01-06 RX ORDER — PANTOPRAZOLE SODIUM 20 MG/1
1 TABLET, DELAYED RELEASE ORAL
Qty: 0 | Refills: 0 | DISCHARGE
Start: 2022-01-06

## 2022-01-06 RX ORDER — INSULIN LISPRO 100/ML
10 VIAL (ML) SUBCUTANEOUS
Qty: 0 | Refills: 0 | DISCHARGE

## 2022-01-06 RX ORDER — ATOVAQUONE 750 MG/5ML
10 SUSPENSION ORAL
Qty: 0 | Refills: 0 | DISCHARGE
Start: 2022-01-06

## 2022-01-06 RX ORDER — DEXAMETHASONE 0.5 MG/5ML
1 ELIXIR ORAL
Qty: 0 | Refills: 0 | DISCHARGE
Start: 2022-01-06

## 2022-01-06 RX ORDER — EZETIMIBE 10 MG/1
1 TABLET ORAL
Qty: 0 | Refills: 0 | DISCHARGE

## 2022-01-06 RX ORDER — PANTOPRAZOLE SODIUM 20 MG/1
1 TABLET, DELAYED RELEASE ORAL
Qty: 0 | Refills: 0 | DISCHARGE

## 2022-01-06 RX ORDER — INSULIN GLARGINE 100 [IU]/ML
46 INJECTION, SOLUTION SUBCUTANEOUS
Qty: 0 | Refills: 0 | DISCHARGE
Start: 2022-01-06

## 2022-01-06 RX ADMIN — Medication 29 UNIT(S): at 11:47

## 2022-01-06 RX ADMIN — EVEROLIMUS 3 MILLIGRAM(S): 10 TABLET ORAL at 06:35

## 2022-01-06 RX ADMIN — Medication 500000 UNIT(S): at 17:47

## 2022-01-06 RX ADMIN — Medication 29 UNIT(S): at 16:37

## 2022-01-06 RX ADMIN — Medication 4: at 11:47

## 2022-01-06 RX ADMIN — EVEROLIMUS 3 MILLIGRAM(S): 10 TABLET ORAL at 17:48

## 2022-01-06 RX ADMIN — Medication 6 MILLIGRAM(S): at 06:35

## 2022-01-06 RX ADMIN — Medication 29 UNIT(S): at 08:08

## 2022-01-06 RX ADMIN — Medication 500000 UNIT(S): at 11:46

## 2022-01-06 RX ADMIN — Medication 500000 UNIT(S): at 06:37

## 2022-01-06 RX ADMIN — PANTOPRAZOLE SODIUM 40 MILLIGRAM(S): 20 TABLET, DELAYED RELEASE ORAL at 17:47

## 2022-01-06 RX ADMIN — ENOXAPARIN SODIUM 100 MILLIGRAM(S): 100 INJECTION SUBCUTANEOUS at 06:39

## 2022-01-06 RX ADMIN — ATOVAQUONE 1500 MILLIGRAM(S): 750 SUSPENSION ORAL at 11:46

## 2022-01-06 RX ADMIN — MYCOPHENOLATE MOFETIL 500 MILLIGRAM(S): 250 CAPSULE ORAL at 17:50

## 2022-01-06 RX ADMIN — VALGANCICLOVIR 450 MILLIGRAM(S): 450 TABLET, FILM COATED ORAL at 11:52

## 2022-01-06 RX ADMIN — PANTOPRAZOLE SODIUM 40 MILLIGRAM(S): 20 TABLET, DELAYED RELEASE ORAL at 06:35

## 2022-01-06 NOTE — DISCHARGE NOTE NURSING/CASE MANAGEMENT/SOCIAL WORK - PATIENT PORTAL LINK FT
You can access the FollowMyHealth Patient Portal offered by [facility] by registering at the following website: [email or web address]. By joining [employer]’s FollowMyHealth portal, you will also be able to view your health information using other applications (apps) compatible with our system.

## 2022-01-06 NOTE — CHART NOTE - NSCHARTNOTEFT_GEN_A_CORE
Nutrition Follow Up Note  Patient seen for: Malnutrition follow-up, consult seen regarding nutrition services assessment education    Chart reviewed, events noted - "Pt is a 67 y/o male, with a PMH of T2DM, fatty liver disease (nonalcoholic), GI bleed, hepatic encephalopathy, hypercholesterolemia, HTN, neuropathy, obesity, s/p cholecystectomy,  donor liver transplant,  decompensated JEAN cirrhosis status post liver transplant  for JEAN presents status post unwitnessed fall"    Source: [x] Patient       [x] Medical record        [] RN        [] Family at bedside       [] Other:    -If unable to interview patient: [] Trach/Vent/BiPAP  [] Disoriented/confused/inappropriate to interview    Diet Order: Diet, Regular:   Consistent Carbohydrate {Evening Snack} (CSTCHOSN)  DASH/TLC {Sodium & Cholesterol Restricted} (DASH) (21 @ 21:47) [Active]    Is current order appropriate/adequate? [X] Yes  []  No     PO intake :   [X] >75%  Adequate    [] 50-75%  Fair       [] <50%  Poor    Nutrition-related concerns:   - Pt assessed at bedside. Reports good appetite, tolerating current with good PO intake, confirmed with flow sheets that indicate % PO intake.   - Pt upset at time of RD visit, complaining of "not getting his salt packets for breakfast". As per previous RD note, pt to be "provided with 2 salt packets for breakfast to increase PO intake". Communicated with diet office.   - Pt with elevated fingersticks due to steroid induced hyperglycemia, currently on Ademlog, Lantus.   - Food preference of tea obtained, will honor as able.     GI: Pt reports no nausea, vomiting, diarrhea, constipation. Last BM on  per pt. No chewing/swallowing difficulty at this time.     Weights:   Dosing weight: 210.1 lbs (). No new weights listed. Will continue to monitor as able.     Nutritionally Pertinent Medications: Lovenox, Admelog, Lantus, Decadron, Lipitor, Protonix    Labs: on   Cr 1.8 (H)  BUN 44 (H)  Gluc 142 (H)  Fingersticks 130 () ; 151-225 () ; 155-219 ()  Hb A1c 10.1 (H) ()    Skin per nursing documentation: no pressure injuries reported   Edema per nursing documentation: no edema noted     Estimated Needs:   [X] no change since previous assessment  Based on dosing weight of 95.3 kg  Energy needs: 20-25 kcal/day (1385-1716 calories)  Protein needs: 0.8-1.0 g/kg (76-95g protein)  [] recalculated:     Previous Nutrition Diagnosis:   1. Moderate protein-calorie malnutrition, chronic  Nutrition Diagnosis is: [X] ongoing  [] resolved [] not applicable     New Nutrition Diagnosis: [X] Not applicable    Nutrition Care Plan:  [X] In Progress   [] Achieved  [] Not applicable    Nutrition Interventions: Education Provided: [] Yes:  [X] No: pt denied education at time of RD visit as pt upset. Diet education/literature provided during previous RD visit.     Recommendations:     1. Liberalize diet to regular, low sodium, consistent carbohydrate with evening snack, to further optimize PO intake.     2. Encourage PO intake   3. Monitor weight trends, labs, hydration status, BM, GI distress, skin integrity   4. RD to remain available; obtain and honor food preferences as able     Monitoring and Evaluation:   Continue to monitor nutritional intake, tolerance to diet prescription, weights, labs, skin integrity    RD remains available upon request and will follow up per protocol  Srini Williamson, Dietetic Intern Pager#[phone] Nutrition Follow Up Note  Patient seen for: Malnutrition follow-up, consult seen regarding nutrition services assessment education    Chart reviewed, events noted - "Pt is a 67 y/o male, with a PMH of T2DM, fatty liver disease (nonalcoholic), GI bleed, hepatic encephalopathy, hypercholesterolemia, HTN, neuropathy, obesity, s/p cholecystectomy,  donor liver transplant,  decompensated JEAN cirrhosis status post liver transplant  for JEAN presents status post unwitnessed fall"    Source: [x] Patient       [x] Medical record        [] RN        [] Family at bedside       [] Other:    -If unable to interview patient: [] Trach/Vent/BiPAP  [] Disoriented/confused/inappropriate to interview    Diet Order: Diet, Regular:   Consistent Carbohydrate {Evening Snack} (CSTCHOSN)  DASH/TLC {Sodium & Cholesterol Restricted} (DASH) (21 @ 21:47) [Active]    Is current order appropriate/adequate? [X] Yes  []  No     PO intake :   [X] >75%  Adequate    [] 50-75%  Fair       [] <50%  Poor    Nutrition-related concerns:   - Pt assessed at bedside. Reports good appetite, tolerating current with good PO intake, confirmed with flow sheets that indicate % PO intake. Pt not amenable to try Glucerna at this time.   - Pt upset at time of RD visit, complaining of "not getting his salt packets for breakfast". As per previous RD note, pt to be "provided with 2 salt packets for breakfast to increase PO intake". Communicated with diet office.   - Pt with elevated fingersticks due to steroid induced hyperglycemia, currently on Ademlog, Lantus.   - Food preference of tea obtained, will honor as able.     GI: Pt reports no nausea, vomiting, diarrhea, constipation. Last BM on  per pt. No chewing/swallowing difficulty at this time.     Weights:   Dosing weight: 210.1 lbs (). No new weights listed. Will continue to monitor as able.     Nutritionally Pertinent Medications: Lovenox, Admelog, Lantus, Decadron, Lipitor, Protonix    Labs: on   Cr 1.8 (H)  BUN 44 (H)  Gluc 142 (H)  Fingersticks 130 () ; 151-225 () ; 155-219 ()  Hb A1c 10.1 (H) ()    Skin per nursing documentation: no pressure injuries reported   Edema per nursing documentation: no edema noted     Estimated Needs:   [X] no change since previous assessment  Based on dosing weight of 95.3 kg  Energy needs: 20-25 kcal/day (2523-9647 calories)  Protein needs: 0.8-1.0 g/kg (76-95g protein)  [] recalculated:     Previous Nutrition Diagnosis:   1. Moderate protein-calorie malnutrition, chronic  Nutrition Diagnosis is: [X] ongoing  [] resolved [] not applicable     New Nutrition Diagnosis: [X] Not applicable    Nutrition Care Plan:  [X] In Progress   [] Achieved  [] Not applicable    Nutrition Interventions: Education Provided: [] Yes:  [X] No: pt denied education at time of RD visit as pt upset. Diet education/literature provided during previous RD visit.     Recommendations:     1. Liberalize diet to regular, low sodium, consistent carbohydrate with evening snack, to further optimize PO intake.     2. Encourage PO intake   3. Monitor weight trends, labs, hydration status, BM, GI distress, skin integrity   4. RD to remain available; obtain and honor food preferences as able     Monitoring and Evaluation:   Continue to monitor nutritional intake, tolerance to diet prescription, weights, labs, skin integrity    RD remains available upon request and will follow up per protocol  Sriin Williamson, Dietetic Intern Pager#[phone] Nutrition Follow Up Note  Patient seen for: Malnutrition follow-up, consult seen regarding nutrition services assessment education    Chart reviewed, events noted - "Pt is a 67 y/o male, with a PMH of T2DM, fatty liver disease (nonalcoholic), GI bleed, hepatic encephalopathy, hypercholesterolemia, HTN, neuropathy, obesity, s/p cholecystectomy,  donor liver transplant,  decompensated JEAN cirrhosis status post liver transplant  for JEAN presents status post unwitnessed fall"    Source: [x] Patient       [x] Medical record        [] RN        [] Family at bedside       [] Other:    -If unable to interview patient: [] Trach/Vent/BiPAP  [] Disoriented/confused/inappropriate to interview    Diet Order: Diet, Regular:   Consistent Carbohydrate {Evening Snack} (CSTCHOSN)  DASH/TLC {Sodium & Cholesterol Restricted} (DASH) (21 @ 21:47) [Active]    Is current order appropriate/adequate? [X] Yes  []  No     PO intake :   [X] >75%  Adequate    [] 50-75%  Fair       [] <50%  Poor    Nutrition-related concerns:   - Pt assessed at bedside. Reports good appetite, tolerating current with good PO intake, confirmed with flow sheets that indicate % PO intake. Pt not amenable to try Glucerna at this time.   - Pt upset at time of RD visit, complaining of "not getting his salt packets for breakfast". As per previous RD note, pt to be "provided with 2 salt packets for breakfast to increase PO intake". Communicated with diet office.   - Pt with elevated fingersticks due to steroid induced hyperglycemia, currently on Ademlog, Lantus.   - Food preference of tea obtained, will honor as able.     GI: Pt reports no nausea, vomiting, diarrhea, constipation. Last BM on  per pt. No chewing/swallowing difficulty at this time.     Weights:   Dosing weight: 210.1 lbs (). No new weights listed. Will continue to monitor as able.     Nutritionally Pertinent Medications: Lovenox, Admelog, Lantus, Decadron, Lipitor, Protonix    Labs: on   Cr 1.8 (H)  BUN 44 (H)  Gluc 142 (H)  Fingersticks 130 () ; 151-225 () ; 155-219 ()  Hb A1c 10.1 (H) ()    Skin per nursing documentation: no pressure injuries reported   Edema per nursing documentation: no edema noted     Estimated Needs:   [X] no change since previous assessment  Based on dosing weight of 95.3 kg  Energy needs: 20-25 kcal/day (8720-0110 calories)  Protein needs: 0.8-1.0 g/kg (76-95g protein)  [] recalculated:     Previous Nutrition Diagnosis:   1. Moderate protein-calorie malnutrition, chronic  Nutrition Diagnosis is: [X] ongoing  [] resolved [] not applicable     New Nutrition Diagnosis: [X] Not applicable    Nutrition Care Plan:  [X] In Progress   [] Achieved  [] Not applicable    Nutrition Interventions: Education Provided: [] Yes:  [X] No: pt denied education at time of RD visit as pt upset. Diet education/literature provided during previous RD visit. Pt with no nutrition-related questions at this time. Made aware RD remains available.     Recommendations:     1. Liberalize diet to regular, low sodium, consistent carbohydrate with evening snack, to further optimize PO intake.     2. Encourage PO intake   3. Monitor weight trends, labs, hydration status, BM, GI distress, skin integrity   4. RD to remain available; obtain and honor food preferences as able     Monitoring and Evaluation:   Continue to monitor nutritional intake, tolerance to diet prescription, weights, labs, skin integrity    RD remains available upon request and will follow up per protocol  Srini Williamson, Dietetic Intern Pager#[phone] Nutrition Follow Up Note  Patient seen for: Malnutrition follow-up, consult seen regarding nutrition services assessment education    Chart reviewed, events noted - "Pt is a 65 y/o male, with a PMH of T2DM, fatty liver disease (nonalcoholic), GI bleed, hepatic encephalopathy, hypercholesterolemia, HTN, neuropathy, obesity, s/p cholecystectomy,  donor liver transplant,  decompensated JEAN cirrhosis status post liver transplant  for JEAN presents status post unwitnessed fall"    Source: [x] Patient       [x] Medical record        [] RN        [] Family at bedside       [] Other:    -If unable to interview patient: [] Trach/Vent/BiPAP  [] Disoriented/confused/inappropriate to interview    Diet Order: Diet, Regular:   Consistent Carbohydrate {Evening Snack} (CSTCHOSN)  DASH/TLC {Sodium & Cholesterol Restricted} (DASH) (21 @ 21:47) [Active]    Is current order appropriate/adequate? [X] Yes  []  No     PO intake :   [X] >75%  Adequate    [] 50-75%  Fair       [] <50%  Poor    Nutrition-related concerns:   - Pt assessed at bedside. Reports good appetite, tolerating current with good PO intake, confirmed with flow sheets that indicate % PO intake. Pt not amenable to try Glucerna at this time.   - Pt upset at time of RD visit, complaining of "not getting his salt packets for breakfast". As per previous RD note, pt to be "provided with 2 salt packets for breakfast to increase PO intake". Communicated with diet office.   - Pt with elevated fingersticks due to steroid induced hyperglycemia, currently on Ademlog, Lantus.   - Food preference of tea obtained, will honor as able.     GI: Pt reports no nausea, vomiting, diarrhea, constipation. Last BM on  per pt. No chewing/swallowing difficulty at this time.     Weights:   Dosing weight: 210.1 lbs (). No new weights listed. Will continue to monitor as able.     Nutritionally Pertinent Medications: Lovenox, Admelog, Lantus, Decadron, Lipitor, Protonix, Cellcept    Labs: on   Cr 1.80 (H)  BUN 44 (H)  Gluc 142 (H)  Fingersticks 130 () ; 151-225 () ; 155-219 (1/4)  Hb A1c 10.1 (H) ()    Skin per nursing documentation: no pressure injuries reported   Edema per nursing documentation: no edema noted     Estimated Needs:   [X] no change since previous assessment  Based on dosing weight of 95.3 kg  Energy needs: 20-25 kcal/day (8465-6095 calories)  Protein needs: 0.8-1.0 g/kg (76-95g protein)  [] recalculated:     Previous Nutrition Diagnosis:   1. Moderate protein-calorie malnutrition, chronic  Nutrition Diagnosis is: [X] ongoing  [] resolved [] not applicable     New Nutrition Diagnosis: [X] Not applicable    Nutrition Care Plan:  [X] In Progress   [] Achieved  [] Not applicable    Nutrition Interventions: Education Provided: [] Yes:  [X] No: pt denied education at time of RD visit as pt upset. Diet education/literature provided during previous RD visit. Pt with no nutrition-related questions at this time. Made aware RD remains available.     Recommendations:     1. Liberalize diet to regular, low sodium, consistent carbohydrate with evening snack, to further optimize PO intake.     2. Encourage PO intake   3. Monitor weight trends, labs, hydration status, BM, GI distress, skin integrity   4. RD to remain available; obtain and honor food preferences as able     Monitoring and Evaluation:   Continue to monitor nutritional intake, tolerance to diet prescription, weights, labs, skin integrity    RD remains available upon request and will follow up per protocol  Srini Williamson, Dietetic Intern Pager#[phone]

## 2022-01-06 NOTE — PROGRESS NOTE ADULT - PROBLEM SELECTOR PLAN 5
H/H slowly downtrending. Per nursing, patient had "dark brown" stools. Unfortunately sample was not saved so could not assess. Previous endoscopy was done roughly 1 year ago for melena and was shown to have varices and bleeding. Stable. No additional melena  - transfuse if Hgb < 7  - Gi prophlyaxis: protonix 40 q12   - Hep not planning in scoping currently

## 2022-01-06 NOTE — PROGRESS NOTE ADULT - PROBLEM SELECTOR PLAN 3
CT chest concerning for possible beginnings of multifocal pneumonia due to covid vs everolimus side effect. Improved.   - Pulm consult: are lung findings secondary to covid or everolimus side effect? Unable to be determined by imaging  - S/P Empiric antibiotics for five days (ceftriaxone 12/21 - 12/25) per ID

## 2022-01-06 NOTE — PROGRESS NOTE ADULT - ATTENDING COMMENTS
Metabolic syndrome, obesity, JEAN cirrhosis s/p liver transplant 7/2/20 w/ CKD on everolimus, MMF, pred 5 mg daily here with 1 month of cough found to have bilateral pulmonary infiltrates and COVID+. Not requiring supplemental oxygen, afebrile, and not short of breath.    Oxygenation improved.  Pending DC to [facility].  Decrease EVR from 4/4 to 3/3 based on high trough levels although still unclear if they are really adequate trough levels.  Liver biopsy for elevated liver enzymes reported inconclusive from last week. I suspect that is related more to Bactrim that was started around 12/20.  bactrim switched kaela mepron 1/3/21, enzyme improving.  Needs follow-up CT chest in 1-2 months.   Will return to prednisone 5 mg daily after dexamethasone finishes.  Resume /500 per ID today.

## 2022-01-06 NOTE — PROGRESS NOTE ADULT - SUBJECTIVE AND OBJECTIVE BOX
Diabetes Follow up note:    Chief complaint: T2DM w/steroid induced hyperglycemia    Interval Hx: BG values 130-low 200s over past 24 hours. Pt seen at bedside. Remains on decadron 6mg daily, last dose scheduled for 1/7. Pt to be discharged to rehab later today. Reports feeling well. Tolerating POs. Discussed his home regimen. Often will only eat one meal a day. Had liver txplant last year and treated patient at that time.     Review of Systems:  General: denies pain  GI: Tolerating POs. Denies N/V/D/Abd pain  CV: Denies CP/SOB  ENDO: No S&Sx of hypoglycemia  MEDS:  atorvastatin 40 milliGRAM(s) Oral at bedtime  dexAMETHasone     Tablet 6 milliGRAM(s) Oral daily    insulin glargine Injectable (LANTUS) 46 Unit(s) SubCutaneous at bedtime  insulin lispro (ADMELOG) corrective regimen sliding scale   SubCutaneous at bedtime  insulin lispro (ADMELOG) corrective regimen sliding scale   SubCutaneous three times a day before meals  insulin lispro Injectable (ADMELOG) 29 Unit(s) SubCutaneous three times a day before meals    atovaquone  Suspension 1500 milliGRAM(s) Oral daily  nystatin    Suspension 395414 Unit(s) Oral four times a day  valGANciclovir 450 milliGRAM(s) Oral daily    Allergies    codeine (Anaphylaxis)      PE:  General: Male lying in bed. NAD>   Vital Signs Last 24 Hrs  T(C): 36.6 (06 Jan 2022 12:10), Max: 36.7 (05 Jan 2022 20:30)  T(F): 97.9 (06 Jan 2022 12:10), Max: 98.1 (05 Jan 2022 20:30)  HR: 79 (06 Jan 2022 12:10) (70 - 85)  BP: 138/74 (06 Jan 2022 12:10) (127/73 - 152/79)  BP(mean): --  RR: 18 (06 Jan 2022 12:10) (18 - 18)  SpO2: 97% (06 Jan 2022 12:10) (96% - 97%)  Abd: Soft, NT,ND,   Extremities: Warm. no edema x 4 ext.   Neuro: A&O X3    LABS:  POCT Blood Glucose.: 208 mg/dL (01-06-22 @ 11:36)  POCT Blood Glucose.: 130 mg/dL (01-06-22 @ 07:46)  POCT Blood Glucose.: 200 mg/dL (01-05-22 @ 21:41)  POCT Blood Glucose.: 151 mg/dL (01-05-22 @ 16:36)  POCT Blood Glucose.: 216 mg/dL (01-05-22 @ 11:22)  POCT Blood Glucose.: 225 mg/dL (01-05-22 @ 07:47)  POCT Blood Glucose.: 219 mg/dL (01-04-22 @ 21:54)  POCT Blood Glucose.: 187 mg/dL (01-04-22 @ 17:06)  POCT Blood Glucose.: 183 mg/dL (01-04-22 @ 11:52)  POCT Blood Glucose.: 155 mg/dL (01-04-22 @ 07:49)  POCT Blood Glucose.: 211 mg/dL (01-03-22 @ 21:16)  POCT Blood Glucose.: 252 mg/dL (01-03-22 @ 16:28)                            8.6    3.06  )-----------( 176      ( 06 Jan 2022 06:26 )             27.8       01-06    139  |  106  |  44<H>  ----------------------------<  142<H>  4.5   |  22  |  1.80<H>    Ca    8.6      06 Jan 2022 06:26  Phos  3.3     01-06  Mg     2.0     01-06    TPro  5.8<L>  /  Alb  2.9<L>  /  TBili  0.2  /  DBili  x   /  AST  36  /  ALT  147<H>  /  AlkPhos  261<H>  01-05      A1C with Estimated Average Glucose Result: 10.1 % (12-21-21 @ 16:29)          Contact number: kit [phone] or [phone]

## 2022-01-06 NOTE — PROGRESS NOTE ADULT - PROBLEM SELECTOR PLAN 4
Urinalysis consistent with UTI.  - Urine culture <10,000 normal urogenital ninfa  -s/p Ceftriaxone x 5 days

## 2022-01-06 NOTE — PROGRESS NOTE ADULT - SUBJECTIVE AND OBJECTIVE BOX
PROGRESS NOTE:   Authored by Rahel Dotson MD     Patient is a 66y old  Male who presents with a chief complaint of Fall (05 Jan 2022 15:28)      SUBJECTIVE / OVERNIGHT EVENTS:    ADDITIONAL REVIEW OF SYSTEMS:    MEDICATIONS  (STANDING):  atorvastatin 40 milliGRAM(s) Oral at bedtime  atovaquone  Suspension 1500 milliGRAM(s) Oral daily  dexAMETHasone     Tablet 6 milliGRAM(s) Oral daily  dextrose 40% Gel 15 Gram(s) Oral once  dextrose 5%. 1000 milliLiter(s) (50 mL/Hr) IV Continuous <Continuous>  dextrose 5%. 1000 milliLiter(s) (100 mL/Hr) IV Continuous <Continuous>  dextrose 50% Injectable 25 Gram(s) IV Push once  dextrose 50% Injectable 12.5 Gram(s) IV Push once  dextrose 50% Injectable 25 Gram(s) IV Push once  enoxaparin Injectable 100 milliGRAM(s) SubCutaneous two times a day  everolimus (ZORTRESS) 3 milliGRAM(s) Oral two times a day  glucagon  Injectable 1 milliGRAM(s) IntraMuscular once  insulin glargine Injectable (LANTUS) 46 Unit(s) SubCutaneous at bedtime  insulin lispro (ADMELOG) corrective regimen sliding scale   SubCutaneous at bedtime  insulin lispro (ADMELOG) corrective regimen sliding scale   SubCutaneous three times a day before meals  insulin lispro Injectable (ADMELOG) 29 Unit(s) SubCutaneous three times a day before meals  nystatin    Suspension 782635 Unit(s) Oral four times a day  pantoprazole    Tablet 40 milliGRAM(s) Oral two times a day  valGANciclovir 450 milliGRAM(s) Oral daily    MEDICATIONS  (PRN):  benzocaine 15 mG/menthol 3.6 mG (Sugar-Free) Lozenge 1 Lozenge Oral every 6 hours PRN Sore Throat  benzonatate 100 milliGRAM(s) Oral every 8 hours PRN Cough      CAPILLARY BLOOD GLUCOSE      POCT Blood Glucose.: 130 mg/dL (06 Jan 2022 07:46)  POCT Blood Glucose.: 200 mg/dL (05 Jan 2022 21:41)  POCT Blood Glucose.: 151 mg/dL (05 Jan 2022 16:36)  POCT Blood Glucose.: 216 mg/dL (05 Jan 2022 11:22)    I&O's Summary    05 Jan 2022 07:01  -  06 Jan 2022 07:00  --------------------------------------------------------  IN: 900 mL / OUT: 1800 mL / NET: -900 mL        PHYSICAL EXAM:  Vital Signs Last 24 Hrs  T(C): 36.7 (06 Jan 2022 05:15), Max: 36.8 (05 Jan 2022 11:14)  T(F): 98 (06 Jan 2022 05:15), Max: 98.3 (05 Jan 2022 11:14)  HR: 70 (06 Jan 2022 05:15) (70 - 96)  BP: 127/73 (06 Jan 2022 05:15) (116/72 - 152/79)  BP(mean): --  RR: 18 (06 Jan 2022 05:15) (17 - 18)  SpO2: 96% (06 Jan 2022 05:15) (96% - 96%)    GENERAL: No acute distress, well-developed  HEAD:  Atraumatic, Normocephalic  EYES: EOMI, PERRLA, conjunctiva and sclera clear  NECK: Supple, no lymphadenopathy, no JVD  CHEST/LUNG: CTAB; No wheezes, rales, or rhonchi  HEART: Regular rate and rhythm; No murmurs, rubs, or gallops  ABDOMEN: Soft, non-tender, non-distended; normal bowel sounds, no organomegaly  EXTREMITIES:  2+ peripheral pulses b/l, No clubbing, cyanosis, or edema  NEUROLOGY: A&O x 3, no focal deficits  SKIN: No rashes or lesions    LABS:                        8.6    3.06  )-----------( 176      ( 06 Jan 2022 06:26 )             27.8     01-06    139  |  106  |  44<H>  ----------------------------<  142<H>  4.5   |  22  |  1.80<H>    Ca    8.6      06 Jan 2022 06:26  Phos  3.3     01-06  Mg     2.0     01-06    TPro  5.8<L>  /  Alb  2.9<L>  /  TBili  0.2  /  DBili  x   /  AST  36  /  ALT  147<H>  /  AlkPhos  261<H>  01-05                RADIOLOGY & ADDITIONAL TESTS:  Results Reviewed:   Imaging Personally Reviewed:  Electrocardiogram Personally Reviewed:    COORDINATION OF CARE:  Care Discussed with Consultants/Other Providers [Y/N]:  Prior or Outpatient Records Reviewed [Y/N]:   PROGRESS NOTE:   Authored by Rahel Dotson MD     Patient is a 66y old  Male who presents with a chief complaint of Fall (05 Jan 2022 15:28)      SUBJECTIVE / OVERNIGHT EVENTS:  Patient denied chest pain, SOB, fever, chills. Walked to bathroom yesterday without dizziness.     ADDITIONAL REVIEW OF SYSTEMS:    MEDICATIONS  (STANDING):  atorvastatin 40 milliGRAM(s) Oral at bedtime  atovaquone  Suspension 1500 milliGRAM(s) Oral daily  dexAMETHasone     Tablet 6 milliGRAM(s) Oral daily  dextrose 40% Gel 15 Gram(s) Oral once  dextrose 5%. 1000 milliLiter(s) (50 mL/Hr) IV Continuous <Continuous>  dextrose 5%. 1000 milliLiter(s) (100 mL/Hr) IV Continuous <Continuous>  dextrose 50% Injectable 25 Gram(s) IV Push once  dextrose 50% Injectable 12.5 Gram(s) IV Push once  dextrose 50% Injectable 25 Gram(s) IV Push once  enoxaparin Injectable 100 milliGRAM(s) SubCutaneous two times a day  everolimus (ZORTRESS) 3 milliGRAM(s) Oral two times a day  glucagon  Injectable 1 milliGRAM(s) IntraMuscular once  insulin glargine Injectable (LANTUS) 46 Unit(s) SubCutaneous at bedtime  insulin lispro (ADMELOG) corrective regimen sliding scale   SubCutaneous at bedtime  insulin lispro (ADMELOG) corrective regimen sliding scale   SubCutaneous three times a day before meals  insulin lispro Injectable (ADMELOG) 29 Unit(s) SubCutaneous three times a day before meals  nystatin    Suspension [id number] Unit(s) Oral four times a day  pantoprazole    Tablet 40 milliGRAM(s) Oral two times a day  valGANciclovir 450 milliGRAM(s) Oral daily    MEDICATIONS  (PRN):  benzocaine 15 mG/menthol 3.6 mG (Sugar-Free) Lozenge 1 Lozenge Oral every 6 hours PRN Sore Throat  benzonatate 100 milliGRAM(s) Oral every 8 hours PRN Cough      CAPILLARY BLOOD GLUCOSE      POCT Blood Glucose.: 130 mg/dL (06 Jan 2022 07:46)  POCT Blood Glucose.: 200 mg/dL (05 Jan 2022 21:41)  POCT Blood Glucose.: 151 mg/dL (05 Jan 2022 16:36)  POCT Blood Glucose.: 216 mg/dL (05 Jan 2022 11:22)    I&O's Summary    05 Jan 2022 07:01  -  06 Jan 2022 07:00  --------------------------------------------------------  IN: 900 mL / OUT: 1800 mL / NET: -900 mL        PHYSICAL EXAM:  Vital Signs Last 24 Hrs  T(C): 36.7 (06 Jan 2022 05:15), Max: 36.8 (05 Jan 2022 11:14)  T(F): 98 (06 Jan 2022 05:15), Max: 98.3 (05 Jan 2022 11:14)  HR: 70 (06 Jan 2022 05:15) (70 - 96)  BP: 127/73 (06 Jan 2022 05:15) (116/72 - 152/79)  BP(mean): --  RR: 18 (06 Jan 2022 05:15) (17 - 18)  SpO2: 96% (06 Jan 2022 05:15) (96% - 96%)    GENERAL: No acute distress, well-developed  HEAD:  Atraumatic, Normocephalic  EYES: EOMI, PERRLA, conjunctiva and sclera clear  NECK: Supple, no lymphadenopathy, no JVD  CHEST/LUNG: CTAB; No wheezes, rales, or rhonchi  HEART: Regular rate and rhythm; No murmurs, rubs, or gallops  ABDOMEN: Soft, non-tender, non-distended; normal bowel sounds, no organomegaly  EXTREMITIES:  2+ peripheral pulses b/l, No clubbing, cyanosis, or edema  NEUROLOGY: A&O x 3, no focal deficits  SKIN: No rashes or lesions    LABS:                        8.6    3.06  )-----------( 176      ( 06 Jan 2022 06:26 )             27.8     01-06    139  |  106  |  44<H>  ----------------------------<  142<H>  4.5   |  22  |  1.80<H>    Ca    8.6      06 Jan 2022 06:26  Phos  3.3     01-06  Mg     2.0     01-06    TPro  5.8<L>  /  Alb  2.9<L>  /  TBili  0.2  /  DBili  x   /  AST  36  /  ALT  147<H>  /  AlkPhos  261<H>  01-05                RADIOLOGY & ADDITIONAL TESTS:  Results Reviewed:   Imaging Personally Reviewed:  Electrocardiogram Personally Reviewed:    COORDINATION OF CARE:  Care Discussed with Consultants/Other Providers [Y/N]:  Prior or Outpatient Records Reviewed [Y/N]:

## 2022-01-06 NOTE — PROGRESS NOTE ADULT - PROVIDER SPECIALTY LIST ADULT
Internal Medicine
Transplant Hepatology
Transplant ID
Intervent Radiology
Transplant Hepatology
Transplant ID
Transplant Hepatology
Pulmonology
Transplant Hepatology
Transplant ID
Internal Medicine
Endocrinology
Internal Medicine
Internal Medicine
Endocrinology
Endocrinology
Internal Medicine

## 2022-01-06 NOTE — PROGRESS NOTE ADULT - PROBLEM SELECTOR PROBLEM 1
Fall
Fall
Uncontrolled type 2 diabetes mellitus with hyperglycemia, with long-term current use of insulin
Fall
Uncontrolled type 2 diabetes mellitus with hyperglycemia, with long-term current use of insulin
Fall
Fall
Uncontrolled type 2 diabetes mellitus with hyperglycemia, with long-term current use of insulin
Fall

## 2022-01-06 NOTE — PROGRESS NOTE ADULT - PROBLEM SELECTOR PLAN 1
-test BG AC/HS  -c/w Lantus 46 units QHS  -c/w Admelog 29 units AC meals  -c/w Admelog moderate correction scale AC and mod HS scale  -cons carb diet  -steroids-last dose 1/7 in rehab.   Discharge  Can be discharged on Lantus 46 units and Admelog 29 units TID w/meals.   Starting 1/8, can be reduced to Lantus 20 units QHS and Admelog 12 units w/meals. May need further titration in rehab.   -F/u with pvt endo Dr Mathur

## 2022-01-06 NOTE — PROGRESS NOTE ADULT - PROBLEM SELECTOR PLAN 7
- Transplant hepatology recs appreciated  - Transplant surgery recs appreciated  - Transplant ID recs appreciated  - c/w everolimus 3mg q12h  - hold cellcept in the setting of ongoing COVID infection, f/u outpatient   - c/w dexamethasone 6 PO qd  - c/w bactrim and valcyte - Transplant hepatology recs appreciated  - Transplant surgery recs appreciated  - Transplant ID recs appreciated  - c/w everolimus 3mg q12h  - hold cellcept in the setting of ongoing COVID infection, f/u outpatient   - c/w dexamethasone 6 PO qd  - c/w Valcyte  -on atorvaquone, will d/c after being off decadron

## 2022-01-06 NOTE — PROGRESS NOTE ADULT - ASSESSMENT
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 DDx includes COVID ischemic injury (less likely), DILI (possibly 2/2 bactrim started 12/20/21); bactrim discontinued 1/3/21, now w/ downtrending liver enzymes  # fall - likely 2/2 pulmonary symptoms; trop negative, TTE (poor study) negative, no events on tele, no EEG, no brain MRI, plan for outpt follow up  # pulmonary symptoms - patient w/ 1+ month of upper respiratory symptoms, of cough. Presented to ED 11/19/21 with negative workup; this current presentation now found to be COVID positive w/ CT chest showing patchy groundglass opacities. DDx for symptoms include COVID (though had symptoms of cough 1 month ago) vs everolimus pneumonitis. 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 Patient was seen by pulmonary, unable to differentiate between everolimus pneumonitis and COVID. Case was reviewed with radiology - believes that CT findings more consistent with COVID (patchy ground glass infiltrates) and not everolimus pneumonitis (no fibrosis). Would recommend treating for COVID (remdesivir) and continuing current immunosuppression with plan to re-image in a short interval. If patient's respiratory symptoms worsen, will readdress role of holding everolimus and possibly higher doses of steroids for pneumonitis.     # DM - poorly controlled DM, A1c 10.1, better FSG since 1/3/21    # anemia - patient w/ acute on chronic anemia, had dark stools, though unclear if melena (green around edges) and on iron; will continue w/ supportive management, iron supplementation, transfusions prn, no plans for endoscopic evaluation at this time    Recommendations:  - continue to hold bactrim, please c/w atovaquone 1500mg qd while on higher dose of steroids (spoke w/ transplant ID and ok with switch), can be stopped once dexamethasone is stopped   - c/w everolimus 3mg q12  - stable for discharge from hepatology perspective   - repeat CT chest in 1-2 months as outpatient   - restart cellcept 500mg BID  - c/w dexamethasone 6 PO qd per ID, please discuss with ID when can be switched back to prednisone 5mg  - glycemic control per endo  - c/w valcyte   - s/p remdesivir   - pantropazole 40mg PO qd  - trend CMP, INR  - rest of care per primary team    Hepatology will continue to follow.     Charles Salgado, PGY5  Gastroenterology/Hepatology Fellow  Available on [employer] Teams  [phone] ([employer] Short Range Pager)  [phone] (Long Range Pager)    After 5pm, please contact the on-call GI fellow. [phone]

## 2022-01-06 NOTE — DISCHARGE NOTE NURSING/CASE MANAGEMENT/SOCIAL WORK - NSDCVIVACCINE_GEN_ALL_CORE_FT
influenza, injectable, quadrivalent, preservative free; 17-Dec-2019 10:15; Rajinder Rivera (RN); [employer]; [id number] (Exp. Date: 30-Jun-2020); IntraMuscular; Deltoid Right.; 0.5 milliLiter(s); VIS (VIS Published: 15-Aug-2019, VIS Presented: 17-Dec-2019);   Pneumococcal conjugate PCV 13; 16-Dec-2019 18:28; Jodie Serrano (RN); [facility]; [id number] (Exp. Date: 01-Jun-2021); IntraMuscular; Deltoid Right.; 0.5 milliLiter(s); VIS (VIS Published: 05-Nov-2015, VIS Presented: 16-Dec-2019);   pneumococcal polysaccharide PPV23; 18-Feb-2020 13:31; Carroll Guardado (RN); [employer] [id number] (Exp. Date: 18-May-2021); IntraMuscular; Deltoid Right.; 0.5 milliLiter(s); VIS (VIS Published: 06-Oct-2009, VIS Presented: 18-Feb-2020);

## 2022-01-06 NOTE — PROGRESS NOTE ADULT - PROBLEM SELECTOR PLAN 2
Steroid therapy with Dexa for x 10 days> then Prednisone 5mg daily maintenance  -Last dose of dexa for 1/7/22. See above insulin recommendations for rehab

## 2022-01-06 NOTE — PROGRESS NOTE ADULT - PROBLEM SELECTOR PLAN 1
Unclear etiology of the fall. Patient denies loss of consciousness. Low suspicion of a cardiogenic cause with unremarkable trops and EKG. Denies mechanical causes. Patient said that he was "out of it" when he fell but did not lose consciousness. He also said that he urinated on the floor but claims this was intentional. Neurologic cause?   - Neurology following  - Orthostatic blood pressures were positive for orthostatic hypotension  - TTE unremarkable  - c/w telemetry  - Infectious work up: Sputum culture, urine culture, blood cultures so far NGTD  - Pt consult-> rehab

## 2022-01-06 NOTE — PROGRESS NOTE ADULT - NSPROGADDITIONALINFOA_GEN_ALL_CORE
-Plan discussed with pt/team.  Contact info: [phone] (24/7). pager [phone]  [email or web address] password [email or web address]  Spent 30 minutes providing face to face education as well as assessing  pt/labs/meds and discussing plan with primary team (HLD/HTN/DM hx)  Adjusting insulin  Discharge plan  Follow up care
-Plan discussed with pt/team.  Contact info: [phone] (24/7). pager [phone]  [email or web address] password [email or web address]  Spent 30 minutes providing face to face education as well as assessing  pt/labs/meds and discussing plan with primary team (HLD/HTN/DM hx)  Adjusting insulin  Discharge plan  Follow up care
26 minutes spent on the development of plan of care/coordination of care/glycemic control through review of labs, blood glucose values and vital signs.
SPO2 stable on RA and is awaiting on final biopsy report .  cont glucose monitoring         Faustina Maisha   Hospitalist   [phone]

## 2022-01-06 NOTE — PROGRESS NOTE ADULT - PROBLEM SELECTOR PROBLEM 2
2019 novel coronavirus disease (COVID-19)
Steroid-induced hyperglycemia
2019 novel coronavirus disease (COVID-19)
no

## 2022-01-06 NOTE — PROGRESS NOTE ADULT - ASSESSMENT
67 y/o M w/h/o uncontrolled T2DM on insulin and chronic low dose of Prednisone PTA (HbA1C 10.1). DM c/b neuropathy, DFU, retinopathy. Also h/o HFpEF, liver transplant in 2020 for JEAN cirrhosis, MGUS, presented s/p fall. Also found to have Covid requiring decadron.  Going to rehab today. Tolerating POs. Discussed will need insulin adjustments for rehab/home based on steroid taper and A1C. BG goal (100-180mg/dl).

## 2022-01-06 NOTE — PROGRESS NOTE ADULT - NUTRITIONAL ASSESSMENT
Diet, Regular:   Consistent Carbohydrate {Evening Snack} (CSTCHOSN)  DASH/TLC {Sodium & Cholesterol Restricted} (DASH) (12-30-21 @ 21:47) [Active]    Needs consult since pt denies having HTN > off meds but noted variable BP. On Nifedipine as out pt
This patient has been assessed with a concern for Malnutrition and has been determined to have a diagnosis/diagnoses of Moderate protein-calorie malnutrition.    This patient is being managed with:   Diet Regular-  Consistent Carbohydrate {Evening Snack} (CSTCHOSN)  DASH/TLC {Sodium & Cholesterol Restricted} (DASH)  Entered: Dec 20 2021  7:23AM    
Diet, Regular:   Consistent Carbohydrate {Evening Snack} (CSTCHOSN)  DASH/TLC {Sodium & Cholesterol Restricted} (DASH) (12-30-21 @ 21:47) [Active]    Needs consult since pt denies having HTN > off meds but noted variable BP. On Nifedipine as out pt
This patient has been assessed with a concern for Malnutrition and has been determined to have a diagnosis/diagnoses of Moderate protein-calorie malnutrition.    This patient is being managed with:   Diet Regular-  Consistent Carbohydrate {Evening Snack} (CSTCHOSN)  DASH/TLC {Sodium & Cholesterol Restricted} (DASH)  Entered: Dec 30 2021  9:51AM    
Diet, Regular:   Consistent Carbohydrate {Evening Snack} (CSTCHOSN)  Low Sodium (01-06-22 @ 14:55) [Active]
This patient has been assessed with a concern for Malnutrition and has been determined to have a diagnosis/diagnoses of Moderate protein-calorie malnutrition.    This patient is being managed with:   Diet Regular-  Consistent Carbohydrate {Evening Snack} (CSTCHOSN)  DASH/TLC {Sodium & Cholesterol Restricted} (DASH)  Entered: Dec 30 2021  9:51AM    
This patient has been assessed with a concern for Malnutrition and has been determined to have a diagnosis/diagnoses of Moderate protein-calorie malnutrition.    This patient is being managed with:   Diet Regular-  Consistent Carbohydrate {Evening Snack} (CSTCHOSN)  DASH/TLC {Sodium & Cholesterol Restricted} (DASH)  Entered: Dec 20 2021  7:23AM    
This patient has been assessed with a concern for Malnutrition and has been determined to have a diagnosis/diagnoses of Moderate protein-calorie malnutrition.    This patient is being managed with:   Diet Regular-  Consistent Carbohydrate {Evening Snack} (CSTCHOSN)  DASH/TLC {Sodium & Cholesterol Restricted} (DASH)  Entered: Dec 30 2021  9:51AM    
This patient has been assessed with a concern for Malnutrition and has been determined to have a diagnosis/diagnoses of Moderate protein-calorie malnutrition.    This patient is being managed with:   Diet Regular-  Consistent Carbohydrate {Evening Snack} (CSTCHOSN)  DASH/TLC {Sodium & Cholesterol Restricted} (DASH)  Entered: Dec 20 2021  7:23AM

## 2022-01-06 NOTE — PROGRESS NOTE ADULT - PROBLEM SELECTOR PLAN 12
Home 20 rosuvastatin, aspiring 81 for preventative medicine  - atorvastatin 80  - aspirin 81 held given possible GI bleed Home 20 rosuvastatin, aspiring 81 for preventative medicine  - atorvastatin 80  - resume aspirin 81 (no longer GIB)

## 2022-01-06 NOTE — PROGRESS NOTE ADULT - REASON FOR ADMISSION
Fall

## 2022-01-06 NOTE — PROGRESS NOTE ADULT - PROBLEM SELECTOR PLAN 3
BP goal <130/80  -Variable BP readings while in hospital  -On Nifedipine at home  -Please follow BP readings and restart med as needed.  -Manage per primary team

## 2022-01-06 NOTE — PROGRESS NOTE ADULT - SUBJECTIVE AND OBJECTIVE BOX
Gastroenterology/Hepatology Progress Note      Interval Events:   - no acute events, awaiting discharge    Allergies:  codeine (Anaphylaxis)      Hospital Medications:  atorvastatin 40 milliGRAM(s) Oral at bedtime  atovaquone  Suspension 1500 milliGRAM(s) Oral daily  benzocaine 15 mG/menthol 3.6 mG (Sugar-Free) Lozenge 1 Lozenge Oral every 6 hours PRN  benzonatate 100 milliGRAM(s) Oral every 8 hours PRN  dexAMETHasone     Tablet 6 milliGRAM(s) Oral daily  dextrose 40% Gel 15 Gram(s) Oral once  dextrose 5%. 1000 milliLiter(s) IV Continuous <Continuous>  dextrose 5%. 1000 milliLiter(s) IV Continuous <Continuous>  dextrose 50% Injectable 25 Gram(s) IV Push once  dextrose 50% Injectable 12.5 Gram(s) IV Push once  dextrose 50% Injectable 25 Gram(s) IV Push once  enoxaparin Injectable 100 milliGRAM(s) SubCutaneous two times a day  everolimus (ZORTRESS) 3 milliGRAM(s) Oral two times a day  glucagon  Injectable 1 milliGRAM(s) IntraMuscular once  insulin glargine Injectable (LANTUS) 46 Unit(s) SubCutaneous at bedtime  insulin lispro (ADMELOG) corrective regimen sliding scale   SubCutaneous three times a day before meals  insulin lispro (ADMELOG) corrective regimen sliding scale   SubCutaneous at bedtime  insulin lispro Injectable (ADMELOG) 29 Unit(s) SubCutaneous three times a day before meals  nystatin    Suspension [id number] Unit(s) Oral four times a day  pantoprazole    Tablet 40 milliGRAM(s) Oral two times a day  valGANciclovir 450 milliGRAM(s) Oral daily        PHYSICAL EXAM:   Vital Signs:  Vital Signs Last 24 Hrs  T(C): 36.7 (06 Jan 2022 05:15), Max: 36.8 (05 Jan 2022 11:14)  T(F): 98 (06 Jan 2022 05:15), Max: 98.3 (05 Jan 2022 11:14)  HR: 70 (06 Jan 2022 05:15) (70 - 96)  BP: 127/73 (06 Jan 2022 05:15) (116/72 - 152/79)  BP(mean): --  RR: 18 (06 Jan 2022 05:15) (17 - 18)  SpO2: 96% (06 Jan 2022 05:15) (96% - 96%)  Daily     Daily     GENERAL:  No acute distress  HEENT:  NCAT, no scleral icterus  CHEST: no resp distress  HEART:  RRR  ABDOMEN:  Soft, non-tender, non-distended, normoactive bowel sounds, no masses  EXTREMITIES:  No cyanosis, clubbing, or edema  SKIN:  No rash/erythema/ecchymoses/petechiae/wounds/abscess/warm/dry  NEURO:  Alert and oriented x 3, no asterixis, no tremor    LABS:                        8.6    3.06  )-----------( 176      ( 06 Jan 2022 06:26 )             27.8     Mean Cell Volume: 85.5 fl (01-06-22 @ 06:26)    01-06    139  |  106  |  44<H>  ----------------------------<  142<H>  4.5   |  22  |  1.80<H>    Ca    8.6      06 Jan 2022 06:26  Phos  3.3     01-06  Mg     2.0     01-06    TPro  5.8<L>  /  Alb  2.9<L>  /  TBili  0.2  /  DBili  x   /  AST  36  /  ALT  147<H>  /  AlkPhos  261<H>  01-05    LIVER FUNCTIONS - ( 05 Jan 2022 06:05 )  Alb: 2.9 g/dL / Pro: 5.8 g/dL / ALK PHOS: 261 U/L / ALT: 147 U/L / AST: 36 U/L / GGT: x                     Imaging:

## 2022-01-06 NOTE — PROGRESS NOTE ADULT - PROBLEM SELECTOR PLAN 2
Cough and sore throat for the past few days. RVP positive for covid at this visit  - satting well on room air. Desats at night 2/2 SHENG. Refused cpap  - cough drops PRN  - s/p remdesivir (12/20 - 12/25 )  - s/p monoclonal antibody infusion (12/20)  - c/w dexamethasone 10d (12/29-1/7)  -f/u Doppler venous

## 2022-01-06 NOTE — PROGRESS NOTE ADULT - PROBLEM SELECTOR PLAN 4
C/w atorvastatin 40 milliGRAM(s) Oral at bedtime  -F/u elevated LFT. Managed by liver tx team  -pt on Rosuvastatin 20mg plus Zetia 10mg and Lovaza PTA>  restart upon discharge to rehab if not contraindicated  -F/u levels as out pt.      discussed w/pt and team  pager: [phone]   office:  [phone] (M-F 9a-5pm)               [phone] (nights/weekends)   [email or web address] password [email or web address]

## 2022-01-06 NOTE — PROGRESS NOTE ADULT - ASSESSMENT
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 Diff (12/2020), DM, MGUS, and HFpEF, presenting for unwitnessed fall. Currently await rehab placement. Recent CT chest showed possible worsening of COVID PNA, currently on dexamethasone.

## 2022-01-06 NOTE — PROGRESS NOTE ADULT - PROBLEM SELECTOR PLAN 11
Home insulin regimen 12 lantus and 10 humalog premeal. A1c 10.1.  -now on increasing insulin requirements due to steroid induced hyperglycemia  - Endocrine is following  -Lantus 46 and admelog 27 TID

## 2022-01-06 NOTE — PROGRESS NOTE ADULT - ATTENDING SUPERVISION STATEMENT
Resident
Resident
Fellow
Resident

## 2022-01-06 NOTE — DISCHARGE NOTE NURSING/CASE MANAGEMENT/SOCIAL WORK - NSDCPEFALRISK_GEN_ALL_CORE
For information on Fall & Injury Prevention, visit: [email or web address] OR  [email or web address] OR  https://www.cdc.gov/steadi/patient.html

## 2022-01-06 NOTE — PROGRESS NOTE ADULT - ATTENDING COMMENTS
65 y/o M PMHx JEAN cirrhosis s/p DDLT (07/2020, c/b post-op VRE bacteremia), prior ESBL e.coli prostate abscess, R heel OM (12/2020), C. Diff (12/2020), DM 2, MGUS, and HFpEF,  admitted s/p unwitnessed fall.     COVID PNA  - Pulmonology was consulted given question of if the CT chest findings were more likely from covid or side effect of everolimus but concluded that this could not be assessed from imaging alone and recommended against bronchoscopy at this time.  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   -LFT are now down trending.      stable for discharge.  Discharge time spent: 36 min

## 2022-01-06 NOTE — CHART NOTE - NSCHARTNOTESELECT_GEN_ALL_CORE
Infectious Diseases/Event Note
Neurology
Event Note
Follow-up Note/Nutrition Services
Interventional Radiology/Event Note

## 2022-01-06 NOTE — PROGRESS NOTE ADULT - PROBLEM SELECTOR PLAN 8
- home zoloft held in setting of GI bleed  - consider restarting outpt -resume Zoloft due to no longer having GIB

## 2022-01-10 ENCOUNTER — NON-APPOINTMENT (OUTPATIENT)
Age: 67
End: 2022-01-10

## 2022-01-13 RX ORDER — APIXABAN 2.5 MG/1
2 TABLET, FILM COATED ORAL
Qty: 28 | Refills: 0
Start: 2022-01-13 | End: 2022-01-19

## 2022-01-14 RX ORDER — APIXABAN 2.5 MG/1
1 TABLET, FILM COATED ORAL
Qty: 60 | Refills: 0
Start: 2022-01-14 | End: 2022-02-12

## 2022-01-23 PROBLEM — E66.9 OBESITY, CLASS I, BMI 30-34.9: Status: ACTIVE | Noted: 2020-08-20

## 2022-01-23 RX ORDER — DICLOFENAC SODIUM 50 MG/1
50 TABLET, DELAYED RELEASE ORAL TWICE DAILY
Qty: 14 | Refills: 0 | Status: DISCONTINUED | COMMUNITY
Start: 2021-11-29 | End: 2022-01-23

## 2022-01-23 RX ORDER — ADHESIVE TAPE 3"X 2.3 YD
50 MCG TAPE, NON-MEDICATED TOPICAL
Qty: 90 | Refills: 1 | Status: DISCONTINUED | COMMUNITY
Start: 2021-02-04 | End: 2022-01-23

## 2022-01-23 NOTE — HISTORY OF PRESENT ILLNESS
[Nonalcoholic Steatohepatitis (JEAN)] : Nonalcoholic Steatohepatitis (JEAN) [Liver] : Liver [PHS Increased Risk] : no Public Health Service increased risk [Hepatitis C (DANA+)] : no hepatitis c (DANA+) [Hepatitis B Core Ab Positive] : not hepatitis B core Ab positive [FreeTextEntry1] : Prashant Segundo is a 66 yr old PMHx JEAN Cirrhosis s/p DDLT (7/2/2020, c/b post-op VRE bacteremia), h/o R. heel OM (12/2020),\par \par PMHx of IDDM, HLD, obesity, HFpEF with mild LV diastolic  dysfunction, MGUS, chronic anemia with a history of duodenal ulcer as well as GAVE and duodenal AVM s/p APC (last on 10/11/19), 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 \par \par Interval Events (1/24/22): Last seen by me on 11/29/21 and presents today for post-discharge follow up. \par \par Admitted from 12/20/21 to 1/6/22 s/p fall at home, no LOC or injuries. Found to be +Covid w/ CT chest findings concerning for beginnings of multifocal PNA. Received remdesivir and mononclonal antibodies and on dexamethasone (12/29-1/7). 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 PCP prophylaxis changed from bactrim to atovaquone for LFTs. Endocrine consulted for steroid induced hyperglycemia. Patient was found to have DVT on L femoral vein and started on full treatment AC, now on Eliquis 5mg BID. Patient was discharged to [facility].  \par \par Received covid booster? \par \par sugars?       activity? \par \par Current Immuno: Everolimus 3mg BID, MMF 500mg BID, Prednisone 5mg daily \par \par Labs 1/6/22\par AST 36, , AlkP 261, TB 0.2, Cr 1.80 (baseline), Ever 17.2, WBC 3.0, CMV<96, Covid spike 0.40\par

## 2022-01-23 NOTE — ASSESSMENT
[FreeTextEntry1] : 66 yr old PMHx JEAN Cirrhosis s/p DDLT (7/2/2020, c/b post-op VRE bacteremia), h/o R. heel OM (12/2020), with multiple comorbidities including IDDM, HLD and MGUS. \par \par Recent admission s/p fall at home, found to be +Covid w/ CT chest findings concerning for multifocal PNA. Received remdesivir and mononclonal antibodies and on dexamethasone (12/29-1/7). 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 Also w/ DVT on L femoral vein, now on Eliquis 5mg BID. Patient was discharged to [facility].  \par \par Everolimus 3mg BID, MMF 500mg BID, Prednisone 5mg daily \par \par Labs at discharge on 1/6/22 \par AST 36, , AlkP 261, TB 0.2, Cr 1.80 (baseline), Ever 17.2, WBC 3.0, CMV<96, Covid spike 0.40\par \par Plan: \par -

## 2022-01-23 NOTE — REVIEW OF SYSTEMS
[Negative] : Heme/Lymph [Diarrhea] : diarrhea [Itching] : no itching [Skin Rash] : no skin rash [FreeTextEntry9] : right foot wood healed  [de-identified] : reports easy bruising

## 2022-01-23 NOTE — PHYSICAL EXAM
[Alert] : alert [Clear to Auscultation] : lungs were clear to auscultation bilaterally [Normal Rate] : normal rate [Soft] : soft [No Edema] : no edema [] : right femoral palpable [Scleral Icterus] : no scleral icterus [Ascites Fluid Wave] : no ascites fluid wave [Abdominal Ascites] : no abdominal ascites [Spider Angioma] : no spider angioma [Asterixis] : no asterixis [Hepatic Encephalopathy] : no hepatic encephalopathy [de-identified] : throat not particularly injected [de-identified] : well healed [de-identified] : Healed  no hernia [FreeTextEntry1] :  1+ le Edema R>L  [de-identified] : no shake

## 2022-01-24 ENCOUNTER — APPOINTMENT (OUTPATIENT)
Dept: TRANSPLANT | Facility: CLINIC | Age: 67
End: 2022-01-24

## 2022-01-24 DIAGNOSIS — E66.9 OBESITY, UNSPECIFIED: ICD-10-CM

## 2022-01-25 NOTE — ED ADULT TRIAGE NOTE - ACCOMPANIED BY
EMT/paramedic
Gen:  alert, awake, no acute distress  HEENT:  atraumatic head, airway clear, pupils equal and round  CV:  rrr, nl S1, S2, no m/r/g  Pulm:  lungs CTA b/l  Abd: s/nt/nd, +BS, reducible abdominal hernia  Ext:  moving all extremities  Neuro:  grossly intact, no focal deficits  Skin:  clear, dry, intact  Psych: AOx3, normal affect, no apparent risk to self or others

## 2022-01-27 ENCOUNTER — APPOINTMENT (OUTPATIENT)
Dept: TRANSPLANT | Facility: CLINIC | Age: 67
End: 2022-01-27

## 2022-01-27 NOTE — PROGRESS NOTE ADULT - PROBLEM SELECTOR PLAN 7
lovenox for DVT ppx    IMPROVE VTE Individual Risk Assessment          RISK                                                          Points  [  ] Previous VTE                                                3  [  ] Thrombophilia                                             2  [  ] Lower limb paralysis                                   2        (unable to hold up >15 seconds)    [  ] Current Cancer                                             2         (within 6 months)  [  ] Immobilization > 24 hrs                              1  [  ] ICU/CCU stay > 24 hours                             1  [x  ] Age > 60                                                         1    IMPROVE VTE Score: 1
100

## 2022-01-27 NOTE — PHYSICAL THERAPY INITIAL EVALUATION ADULT - PERTINENT HX OF CURRENT PROBLEM, REHAB EVAL
65 yo M PMHx of decompensated JEAN cirrhosis, HTN, HLD, DM, HFpEF (EF 70%) presents with acute liver failure, and reported hepatic encephalopathy.
63 yo M PMHx of decompensated JEAN cirrhosis, HTN, HLD, DM, HFpEF (EF 70%) presents with acute liver failure, and reported hepatic encephalopathy.
operating room

## 2022-01-28 LAB
ALBUMIN SERPL ELPH-MCNC: 3.3 G/DL
ALP BLD-CCNC: 154 U/L
ALT SERPL-CCNC: 84 U/L
ANION GAP SERPL CALC-SCNC: 13 MMOL/L
AST SERPL-CCNC: 30 U/L
BASOPHILS # BLD AUTO: 0.02 K/UL
BASOPHILS NFR BLD AUTO: 0.6 %
BILIRUB SERPL-MCNC: 0.2 MG/DL
BUN SERPL-MCNC: 29 MG/DL
CALCIUM SERPL-MCNC: 8.7 MG/DL
CHLORIDE SERPL-SCNC: 112 MMOL/L
CO2 SERPL-SCNC: 19 MMOL/L
COVID-19 SPIKE DOMAIN ANTIBODY INTERPRETATION: POSITIVE
CREAT SERPL-MCNC: 1.64 MG/DL
EOSINOPHIL # BLD AUTO: 0.03 K/UL
EOSINOPHIL NFR BLD AUTO: 0.9 %
GGT SERPL-CCNC: 40 U/L
GLUCOSE SERPL-MCNC: 185 MG/DL
HCT VFR BLD CALC: 26.9 %
HGB BLD-MCNC: 8 G/DL
IMM GRANULOCYTES NFR BLD AUTO: 1.5 %
LYMPHOCYTES # BLD AUTO: 0.71 K/UL
LYMPHOCYTES NFR BLD AUTO: 20.9 %
MAGNESIUM SERPL-MCNC: 2 MG/DL
MAN DIFF?: NORMAL
MCHC RBC-ENTMCNC: 26.9 PG
MCHC RBC-ENTMCNC: 29.7 GM/DL
MCV RBC AUTO: 90.6 FL
MONOCYTES # BLD AUTO: 0.38 K/UL
MONOCYTES NFR BLD AUTO: 11.2 %
NEUTROPHILS # BLD AUTO: 2.21 K/UL
NEUTROPHILS NFR BLD AUTO: 64.9 %
PLATELET # BLD AUTO: 228 K/UL
POTASSIUM SERPL-SCNC: 4.6 MMOL/L
PROT SERPL-MCNC: 6.2 G/DL
RBC # BLD: 2.97 M/UL
RBC # FLD: 18.8 %
SARS-COV-2 AB SERPL IA-ACNC: >250 U/ML
SODIUM SERPL-SCNC: 144 MMOL/L
WBC # FLD AUTO: 3.4 K/UL

## 2022-01-31 ENCOUNTER — APPOINTMENT (OUTPATIENT)
Dept: TRANSPLANT | Facility: CLINIC | Age: 67
End: 2022-01-31
Payer: MEDICARE

## 2022-01-31 ENCOUNTER — APPOINTMENT (OUTPATIENT)
Dept: TRANSPLANT | Facility: CLINIC | Age: 67
End: 2022-01-31

## 2022-01-31 VITALS
BODY MASS INDEX: 29.39 KG/M2 | WEIGHT: 217 LBS | OXYGEN SATURATION: 96 % | TEMPERATURE: 97.8 F | RESPIRATION RATE: 16 BRPM | HEIGHT: 72 IN | HEART RATE: 87 BPM | DIASTOLIC BLOOD PRESSURE: 65 MMHG | SYSTOLIC BLOOD PRESSURE: 118 MMHG

## 2022-01-31 DIAGNOSIS — F32.A DEPRESSION, UNSPECIFIED: ICD-10-CM

## 2022-01-31 DIAGNOSIS — E78.1 PURE HYPERGLYCERIDEMIA: ICD-10-CM

## 2022-01-31 LAB — CMV DNA SPEC QL NAA+PROBE: ABNORMAL IU/ML

## 2022-01-31 PROCEDURE — 99215 OFFICE O/P EST HI 40 MIN: CPT

## 2022-01-31 RX ORDER — ERYTHROPOIETIN 10000 [IU]/ML
10000 INJECTION, SOLUTION INTRAVENOUS; SUBCUTANEOUS
Qty: 1 | Refills: 0 | Status: DISCONTINUED | COMMUNITY
Start: 2020-09-04 | End: 2022-01-31

## 2022-01-31 RX ORDER — ASPIRIN ENTERIC COATED TABLETS 81 MG 81 MG/1
81 TABLET, DELAYED RELEASE ORAL DAILY
Qty: 90 | Refills: 3 | Status: DISCONTINUED | COMMUNITY
Start: 2020-07-21 | End: 2022-01-31

## 2022-01-31 RX ORDER — INSULIN LISPRO 100 [IU]/ML
100 INJECTION, SOLUTION INTRAVENOUS; SUBCUTANEOUS
Refills: 3 | Status: ACTIVE | COMMUNITY
Start: 2020-07-21

## 2022-01-31 RX ORDER — ATOVAQUONE 750 MG/5ML
750 SUSPENSION ORAL DAILY
Qty: 4 | Refills: 3 | Status: DISCONTINUED | COMMUNITY
Start: 2021-03-11 | End: 2022-01-31

## 2022-01-31 NOTE — REVIEW OF SYSTEMS
[Diarrhea] : diarrhea [Itching] : no itching [Skin Rash] : no skin rash [FreeTextEntry9] : right foot wood healed  [de-identified] : reports easy bruising

## 2022-01-31 NOTE — ASSESSMENT
[FreeTextEntry1] : 66 yr old PMHx JEAN Cirrhosis s/p DDLT (7/2/2020, c/b post-op VRE bacteremia), h/o R. heel OM (12/2020), with multiple comorbidities including IDDM, HLD and MGUS. \par \par Recent admission s/p fall at home, found to be +Covid w/ CT chest findings concerning for multifocal PNA. Received remdesivir and mononclonal antibodies and on dexamethasone (12/29-1/7). 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 Also w/ DVT on L femoral vein, now on Eliquis 5mg BID. Patient was discharged to [facility].  \par \par Everolimus 4mg BID, MMF 500mg BID, Prednisone 5mg daily \par \par Labs 1/27/22\par AST 30, ALT 84, AlkP 154, TB 0.2, Cr 1.64, James >250, CMV<96, Everolimus pending\par \par Plan: \par -D/c aspirin \par -Discussed booster\par -Lipids with next set of labs\par -RTC in 2-3 months

## 2022-01-31 NOTE — PHYSICAL EXAM
[Scleral Icterus] : no scleral icterus [Ascites Fluid Wave] : no ascites fluid wave [Abdominal Ascites] : no abdominal ascites [Spider Angioma] : no spider angioma [Asterixis] : no asterixis [Hepatic Encephalopathy] : no hepatic encephalopathy [de-identified] : throat not injected [de-identified] : well healed [de-identified] : Healed  no hernia [FreeTextEntry1] : no edema [de-identified] : no shake

## 2022-01-31 NOTE — HISTORY OF PRESENT ILLNESS
[PHS Increased Risk] : no Public Health Service increased risk [Hepatitis C (DANA+)] : no hepatitis c (DANA+) [Hepatitis B Core Ab Positive] : not hepatitis B core Ab positive [FreeTextEntry1] : Prashant Segundo is a 66 yr old PMHx JEAN Cirrhosis s/p DDLT (7/2/2020, c/b post-op VRE bacteremia), h/o R. heel OM (12/2020),\par \par PMHx of IDDM, HLD, obesity, HFpEF with mild LV diastolic  dysfunction, MGUS, chronic anemia with a history of duodenal ulcer as well as GAVE and duodenal AVM s/p APC (last on 10/11/19), 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 \par \par Interval Events (1/24/22): Last seen by me on 11/29/21 and presents today for post-discharge follow up. \par \par Admitted from 12/20/21 to 1/6/22 s/p fall at home, no LOC or injuries. Found to be +Covid w/ CT chest findings concerning for beginnings of multifocal PNA. Received remdesivir and mononclonal antibodies and on dexamethasone (12/29-1/7). 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 PCP prophylaxis changed from bactrim to atovaquone for LFTs. Endocrine consulted for steroid induced hyperglycemia. Patient was found to have DVT on L femoral vein and started on full treatment AC, now on Eliquis 5mg BID. Patient was discharged to [facility].  \par \par Did not receive covid booster. \par \par Saw Endo on 1/28/22. Lantus decreased to 8 units qHS and humalog decreased to 8 units pre-meals.\par \par Using walker, activity improving.  \par \par Maintained on eliquis 5mg BID for left femoral vein DVT. \par \par Current Immuno: Everolimus 4mg BID, MMF 500mg BID, Prednisone 5mg daily \par \par Labs 1/27/22\par AST 30, ALT 84, AlkP 154, TB 0.2, Cr 1.64, James >250, CMV<96, Everolimus pending

## 2022-02-01 NOTE — PROGRESS NOTE ADULT - PROBLEM/PLAN-5
Patient states no med changes or bleeding problems or unexplained bruising. Patient instructed to continue current dosing schedule and eat a serving of green veggies once weekly (this is what pt states is feasible). Verbalizes understanding. Will recheck next week. Patient instructed regarding medication; results given and questions answered. Nutritional counseling given.  Dietary factors affecting therapy addressed.  Patient instructed to monitor for excessive bruising or bleeding. Findings reported by Hannah Gutierrez RN.    Today's INR is Lab Results - Last 18 Months   Lab Units 02/01/22  0000   INR  2.60*           DISPLAY PLAN FREE TEXT

## 2022-02-02 LAB — EVEROLIMUS BLD-MCNC: 9.3 NG/ML

## 2022-02-08 RX ORDER — APIXABAN 5 MG/1
5 TABLET, FILM COATED ORAL TWICE DAILY
Qty: 60 | Refills: 5 | Status: ACTIVE | COMMUNITY
Start: 2022-01-31 | End: 1900-01-01

## 2022-02-08 RX ORDER — UBIDECARENONE/VIT E ACET 100MG-5
50 MCG CAPSULE ORAL
Qty: 90 | Refills: 1 | Status: ACTIVE | COMMUNITY
Start: 2022-02-08 | End: 1900-01-01

## 2022-02-10 NOTE — PROGRESS NOTE ADULT - NSREFPHYEXREFTO_GEN_ALL_CORE
Palliative Care Initial Visit      Facility: [facility]     Patient Name: Brandon Bennett  YOB: 1973  MRN: [id number]    Date of Visit: 5/27/2021   Visit Made By: Bharath Lee CNP  Location or unit:Unit     Referring Provider:  Dorita Zuniga CNP       Primary Care Physician:   Neri Puri MD  Office Phone #: [phone]  Fax Phone #: [phone]    Goals of Care/Level of Care     Number of hospitalizations in the last year:  Multiple admissions within the past year.   Last hospitalization: 7/22/2020     Advanced Care Documents: No advanced directives on file.     POA / Surrogate :  No POA paperwork previously completed.  Discussed today.  Patient wishes to discuss this with his wife, who he said will be returning back to the hospital later in the day.   POLST: Not previously completed.   Code Status: FULL CODE    Prognosis (quality & quantity):  Guarded.   Basis for estimate:chart review, clinical assessment and discussion with medical team  High risk of death within one year? Yes    No chief complaint on file.    HX Obtained by: Patient, Medical Team and Chart Review    HPI  47 year old male with history of HFrEF, NYHA Class II, AHA Class C, HTN, and CKD who presented to [facility] for elective right heart catheterization for titration of medications and evaluation for advanced heart failure interventions including LVAD and transplant.  Etiology is nonischemic, diagnosed with HFrEF in 6624-3243 following viral infection.  TTE on 1/4/2021 with LVEF 16% with global hypokinesis.   Palliative Care consulted on 5/26 for goals of care.     Brandon has a past medical history of Abdominal bloating, Chronic kidney disease, CKD (chronic kidney disease), Congestive cardiac failure (CMS/HCC), Epistaxis, High cholesterol, Hyperlipoproteinemia, Hypertension, Hypoxia, Nonischemic dilated cardiomyopathy (CMS/HCC), Obesity (BMI 30.0-34.9), On home oxygen therapy, and Sleep apnea. He also has no past medical  Patient has an appointment today with Dr. Dueñas    Thank you,  Layla Gerardo, RN  Triage Nurse   history of Difficult intubation, Failed moderate sedation during procedure, Malignant hyperthermia, Motion sickness, or PONV (postoperative nausea and vomiting).  Brandon has Abnormal cardiovascular function study; Acute on chronic HFrEF (heart failure with reduced ejection fraction) (CMS/HCC); Hypertensive heart disease with chronic systolic congestive heart failure (CMS/HCC); Pure hypercholesterolemia; At risk for sudden cardiac death; Nonischemic dilated cardiomyopathy (CMS/HCC); Chronic combined systolic and diastolic congestive heart failure (CMS/HCC); IVCD (intraventricular conduction defect); Abdominal bloating; Flu vaccine need; Epistaxis; Chronic rhinitis; S/P ICD (internal cardiac defibrillator) procedure; Obesity (BMI 30.0-34.9); ICD (implantable cardioverter-defibrillator) in place; Seborrheic dermatitis; Central sleep apnea; Retching; Screening for prostate cancer; Palliative care encounter; and Counseling regarding advanced care planning and goals of care on their problem list.  Brandon has a past surgical history that includes ICD Implant; Cardiac catheterization; and Left heart cath (01/10/2019).  His family history includes Diabetes in his mother; Heart disease in his father and mother; Liver Disease in his father.  Brandon reports that he quit smoking about 3 years ago. His smoking use included cigarettes. He smoked 0.00 packs per day. He has never used smokeless tobacco. He reports current alcohol use of about 1.0 standard drinks of alcohol per week. He reports that he does not use drugs.  Brandon Taylor has No Known Allergies.    Review of Systems   All other systems reviewed and are negative.  ALL SYSTEMS REVIEWED AND NEGATIVE EXCEPT FOR THOSE IN HPI.    Vitals:    05/27/21 1300 05/27/21 1400 05/27/21 1500 05/27/21 1600   BP: 98/61 107/62 (!) 81/51 (!) 87/60   Pulse: 100 81 80 78   Resp: 18 15 (!) 22 (!) 27   Temp:       TempSrc:       SpO2: 98%      Weight:       Height:         ESAS Scale   [place]  Inpatient Physical Exam Symptom Assessment System  Pain: 0    Palliative Performance Scale  Palliative Performance Scale  Palliative Performance Scale: Ambulation Reduced. Unable to do Normal Work Some Evidence of Disease. Self-Care Full. Intake Normal or Reduced. Consciousness Level Full.        Heart Failure Tools  New York Heart Association (NYHA) Functional Classification of Heart Failure  Class & Patient Symptoms: Slight limitation of physical activity. Comfortable at rest. Ordinary physical activity results in fatigue, palpitation, dyspnea (shortness of breath).  American College Cardiology/American Heart Association Stages of Heart Failure  Stage & Patient Symptoms: Objective evidence of moderately severe cardiovasular disease. Marked limitation in activity due to symptoms, even during less-than ordinary activity. Comfortable only at rest.    Objective   Physical Exam   Constitutional: He is oriented to person, place, and time. He appears well-developed and well-nourished. He is active. No distress.   HENT:   Head: Normocephalic and atraumatic.   Eyes: Pupils are equal, round, and reactive to light. Conjunctivae are normal. Right eye exhibits no discharge. Left eye exhibits no discharge. No scleral icterus.   Cardiovascular: Normal rate.   Pulmonary/Chest: Effort normal. No respiratory distress.   Abdominal: He exhibits no distension.   Musculoskeletal:         General: No deformity or edema.      Cervical back: Neck supple.   Neurological: He is alert and oriented to person, place, and time.   Skin: No rash noted. He is not diaphoretic. No erythema.   Psychiatric: He has a normal mood and affect. His speech is normal and behavior is normal. Judgment and thought content normal. Cognition and memory are normal.      Add'l Social Hx   • Patient lives with:     • POA / Surrogate:     • Home based resources in place       Pediatric Personal Preferences   • Favorite things / activities     • Ideas about Quality of Life     • Important  coping items, techniques during stressful times       Spiritual Assessment   • [employer] Tradition     • Active [employer]     • Open to  Visits       Function Status Assessment   • Function Status Assessment       Labs & Imaging   Recent Results (from the past 24 hour(s))   Comprehensive Metabolic Panel    Collection Time: 05/26/21 10:00 PM   Result Value Ref Range    Fasting Status      Sodium 138 135 - 145 mmol/L    Potassium 3.3 (L) 3.4 - 5.1 mmol/L    Chloride 101 98 - 107 mmol/L    Carbon Dioxide 29 21 - 32 mmol/L    Anion Gap 11 10 - 20 mmol/L    Glucose 113 (H) 65 - 99 mg/dL    BUN 20 6 - 20 mg/dL    Creatinine 1.15 0.67 - 1.17 mg/dL    Glomerular Filtration Rate 87 (L) >90 mL/min/1.73m2    BUN/ Creatinine Ratio 17 7 - 25    Calcium 8.6 8.4 - 10.2 mg/dL    Bilirubin, Total 1.7 (H) 0.2 - 1.0 mg/dL    GOT/AST 23 <=37 Units/L    GPT/ALT 46 <64 Units/L    Alkaline Phosphatase 62 45 - 117 Units/L    Albumin 3.9 3.6 - 5.1 g/dL    Protein, Total 7.5 6.4 - 8.2 g/dL    Globulin 3.6 2.0 - 4.0 g/dL    A/G Ratio 1.1 1.0 - 2.4   Magnesium    Collection Time: 05/26/21 10:00 PM   Result Value Ref Range    Magnesium 2.2 1.7 - 2.4 mg/dL   GLUCOSE, BEDSIDE - POINT OF CARE    Collection Time: 05/27/21  3:24 AM   Result Value Ref Range    GLUCOSE, BEDSIDE - POINT OF CARE 132 (H) 70 - 99 mg/dL   BLOOD GAS, MIXED VENOUS WITH COOXIMETRY - RESPIRATORY    Collection Time: 05/27/21  4:00 AM   Result Value Ref Range    CONDITION - RESPIRATORY ROOM AIR     CONDITION - RESPIRATORY      FIO2 - RESPIRATORY      HEMOGLOBIN - RESPIRATORY 16.0 13.0 - 17.0 g/dL    BASE EXCESS / DEFICIT, MIXED VENOUS - RESPIRATORY 6 mmol/L    CARBOXYHEMOGLOBIN, MIXED VENOUS - RESPIRATORY 1 %    HCO3, MIXED VENOUS - RESPIRATORY 31 mmol/L    METHEMOGLOBIN - RESPIRATORY 0.5 <=1.6 %    OXYHEMOGLOBIN, MIXED VENOUS - RESPIRATORY 49.3 %    O2 CONTENT, MIXED VENOUS - RESPIRATORY 11 %    PCO2, MIXED VENOUS - RESPIRATORY 47 mm Hg    PH, MIXED VENOUS -  RESPIRATORY 7.43 Units    PO2, MIXED VENOUS - RESPIRATORY 33 mm Hg    O2 SATURATION, MIXED VENOUS - RESPIRATORY 50 %    SITE - RESPIRATORY Venous Line     TEMPERATURE - RESPIRATORY 36.8 degrees   Free T3    Collection Time: 05/27/21  4:20 AM   Result Value Ref Range    T3, Free 3.4 2.2 - 4.0 pg/mL   Free T4    Collection Time: 05/27/21  4:20 AM   Result Value Ref Range    T4, Free 1.1 0.8 - 1.5 ng/dL   Iron And total Iron Binding Capacity    Collection Time: 05/27/21  4:20 AM   Result Value Ref Range    Iron 55 (L) 65 - 175 mcg/dL    Iron Binding Capacity 321 250 - 450 mcg/dL    Iron, Percent Saturation 17 15 - 45 %   Prealbumin    Collection Time: 05/27/21  4:20 AM   Result Value Ref Range    Prealbumin 33.0 18.0 - 38.0 mg/dL   Thyroid Stimulating Hormone Reflex    Collection Time: 05/27/21  4:20 AM   Result Value Ref Range    TSH 1.972 0.350 - 5.000 mcUnits/mL   Vitamin D -25 Hydroxy    Collection Time: 05/27/21  4:20 AM   Result Value Ref Range    Vitamin D, 25-Hydroxy 12.1 (L) 30.0 - 100.0 ng/mL   Cortisol, AM    Collection Time: 05/27/21  4:20 AM   Result Value Ref Range    Cortisol, AM 13.1 5.2 - 22.5 mcg/dL   Comprehensive Metabolic Panel    Collection Time: 05/27/21  4:21 AM   Result Value Ref Range    Fasting Status      Sodium 137 135 - 145 mmol/L    Potassium 3.7 3.4 - 5.1 mmol/L    Chloride 102 98 - 107 mmol/L    Carbon Dioxide 28 21 - 32 mmol/L    Anion Gap 11 10 - 20 mmol/L    Glucose 124 (H) 65 - 99 mg/dL    BUN 17 6 - 20 mg/dL    Creatinine 1.08 0.67 - 1.17 mg/dL    Glomerular Filtration Rate >90 >90 mL/min/1.73m2    BUN/ Creatinine Ratio 16 7 - 25    Calcium 8.8 8.4 - 10.2 mg/dL    Bilirubin, Total 2.1 (H) 0.2 - 1.0 mg/dL    GOT/AST 21 <=37 Units/L    GPT/ALT 40 <64 Units/L    Alkaline Phosphatase 64 45 - 117 Units/L    Albumin 3.9 3.6 - 5.1 g/dL    Protein, Total 7.6 6.4 - 8.2 g/dL    Globulin 3.7 2.0 - 4.0 g/dL    A/G Ratio 1.1 1.0 - 2.4   Magnesium    Collection Time: 05/27/21  4:21 AM   Result  Value Ref Range    Magnesium 2.3 1.7 - 2.4 mg/dL   Phosphorus    Collection Time: 05/27/21  4:21 AM   Result Value Ref Range    Phosphorus 3.0 2.4 - 4.7 mg/dL   C Reactive Protein    Collection Time: 05/27/21  4:21 AM   Result Value Ref Range    C-Reactive Protein 0.5 <=1.0 mg/dL   Glycohemoglobin    Collection Time: 05/27/21  4:21 AM   Result Value Ref Range    Hemoglobin A1C 6.0 (H) 4.5 - 5.6 %   Lactate Dehydrogenase    Collection Time: 05/27/21  4:21 AM   Result Value Ref Range    LD, Total 212 86 - 234 Units/L   Lipid Panel With Reflex    Collection Time: 05/27/21  4:21 AM   Result Value Ref Range    Fasting Status      Cholesterol 153 <=199 mg/dL    Triglycerides 157 (H) <=149 mg/dL    HDL 48 >=40 mg/dL    LDL 74 <=129 mg/dL    Non-HDL Cholesterol 105 mg/dL    Cholesterol/ HDL Ratio 3.2 <=4.4   Uric Acid    Collection Time: 05/27/21  4:21 AM   Result Value Ref Range    Uric Acid 11.9 (H) 3.5 - 7.2 mg/dL   CBC with Automated Differential (performable only)    Collection Time: 05/27/21  4:21 AM   Result Value Ref Range    WBC 6.0 4.2 - 11.0 K/mcL    RBC 5.06 4.50 - 5.90 mil/mcL    HGB 15.8 13.0 - 17.0 g/dL    HCT 46.9 39.0 - 51.0 %    MCV 92.7 78.0 - 100.0 fl    MCH 31.2 26.0 - 34.0 pg    MCHC 33.7 32.0 - 36.5 g/dL    RDW-CV 12.9 11.0 - 15.0 %    RDW-SD 44.0 39.0 - 50.0 fL     140 - 450 K/mcL    NRBC 0 <=0 /100 WBC    Neutrophil, Percent 63 %    Lymphocytes, Percent 21 %    Mono, Percent 11 %    Eosinophils, Percent 3 %    Basophils, Percent 1 %    Immature Granulocytes 1 %    Absolute Neutrophils 3.8 1.8 - 7.7 K/mcL    Absolute Lymphocytes 1.3 1.0 - 4.8 K/mcL    Absolute Monocytes 0.7 0.3 - 0.9 K/mcL    Absolute Eosinophils  0.2 0.0 - 0.5 K/mcL    Absolute Basophils 0.1 0.0 - 0.3 K/mcL    Absolute Immmature Granulocytes 0.0 0.0 - 0.2 K/mcL   Bilirubin, Direct    Collection Time: 05/27/21  4:21 AM   Result Value Ref Range    Bilirubin, Direct 0.4 (H) 0.0 - 0.2 mg/dL   BLOOD GAS, MIXED VENOUS WITH  COOXIMETRY - RESPIRATORY    Collection Time: 05/27/21  5:11 AM   Result Value Ref Range    CONDITION - RESPIRATORY ROOM AIR     CONDITION - RESPIRATORY      FIO2 - RESPIRATORY      HEMOGLOBIN - RESPIRATORY 15.9 13.0 - 17.0 g/dL    BASE EXCESS / DEFICIT, MIXED VENOUS - RESPIRATORY 4 mmol/L    CARBOXYHEMOGLOBIN, MIXED VENOUS - RESPIRATORY 2 %    HCO3, MIXED VENOUS - RESPIRATORY 29 mmol/L    METHEMOGLOBIN - RESPIRATORY 0.0 <=1.6 %    OXYHEMOGLOBIN, MIXED VENOUS - RESPIRATORY 55.8 %    O2 CONTENT, MIXED VENOUS - RESPIRATORY 12 %    PCO2, MIXED VENOUS - RESPIRATORY 42 mm Hg    PH, MIXED VENOUS - RESPIRATORY 7.45 Units    PO2, MIXED VENOUS - RESPIRATORY 30 mm Hg    O2 SATURATION, MIXED VENOUS - RESPIRATORY 57 %    SITE - RESPIRATORY Venous Line     TEMPERATURE - RESPIRATORY 36.6 degrees   GLUCOSE, BEDSIDE - POINT OF CARE    Collection Time: 05/27/21  7:38 AM   Result Value Ref Range    GLUCOSE, BEDSIDE - POINT OF CARE 133 (H) 70 - 99 mg/dL   Comprehensive Metabolic Panel    Collection Time: 05/27/21 11:04 AM   Result Value Ref Range    Fasting Status      Sodium 134 (L) 135 - 145 mmol/L    Potassium 4.5 3.4 - 5.1 mmol/L    Chloride 101 98 - 107 mmol/L    Carbon Dioxide 28 21 - 32 mmol/L    Anion Gap 10 10 - 20 mmol/L    Glucose 160 (H) 65 - 99 mg/dL    BUN 18 6 - 20 mg/dL    Creatinine 1.21 (H) 0.67 - 1.17 mg/dL    Glomerular Filtration Rate 82 (L) >90 mL/min/1.73m2    BUN/ Creatinine Ratio 15 7 - 25    Calcium 9.4 8.4 - 10.2 mg/dL    Bilirubin, Total 2.6 (H) 0.2 - 1.0 mg/dL    GOT/AST 18 <=37 Units/L    GPT/ALT 46 <64 Units/L    Alkaline Phosphatase 67 45 - 117 Units/L    Albumin 4.2 3.6 - 5.1 g/dL    Protein, Total 8.1 6.4 - 8.2 g/dL    Globulin 3.9 2.0 - 4.0 g/dL    A/G Ratio 1.1 1.0 - 2.4   Magnesium    Collection Time: 05/27/21 11:04 AM   Result Value Ref Range    Magnesium 2.3 1.7 - 2.4 mg/dL   GLUCOSE, BEDSIDE - POINT OF CARE    Collection Time: 05/27/21 11:20 AM   Result Value Ref Range    GLUCOSE, BEDSIDE - POINT  OF CARE 167 (H) 70 - 99 mg/dL       Assessment   Counseling regarding advanced care planning and goals of care  Discussed GOC with the patient.  Patient's goal at this time is to prolong his life, and patient is interested in being considered for advanced heart failure therapies, such as a LVAD or heart transplant if needed.  Patient states that he understands risks with both and was able to speak with another patient who had a LVAD placed.  Patient states he spoke with the other patient on the phone and found the conversation to be very helpful.  Patient wishes to continue returning to the hospital if needed in the future when set backs occur.  Patient has support from family with following up with his providers.  Patient is considering completing POA paperwork today, however he states that he wants to review and discuss with his wife.   Palliative Care IP team will follow up.     Palliative care encounter  Palliative Care NP seen the patient today for an initial visit.  Patient seen sitting up in the chair.  Patient in NAD.  Patient A&Ox4.  Patient denies any pain.  Patient exhibits good insight on PMHx and as well as for reason for current admisson.  Patient reports that he is  and has seven children.  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  Patient has had multiple admissions in the past 12 months.      We discussed advanced heart failure.  Patient states that he was first diagnosis a few years ago and said that it was after having a \"cold.\"   Patient also has hx of HTN and CKD. We discussed his symptoms, which were minimal, such as occasional SOB.  Patient initially presented to the hospital for RHC.  We discussed palliative care services and their potential role in his care in the future.  We discussed importance of completing POA documentation.       Plan:  -Discussed GOC with the patient.    -Patient's goal at this time is to prolong his life, and patient is interested in  being considered for advanced heart failure therapies, such as a LVAD or heart transplant if needed.    -Patient states that he understands risks with both and was able to speak with another patient who had a LVAD placed.  Patient states he spoke with the other patient on the phone and found the conversation to be very helpful.    -Patient wishes to continue returning to the hospital if needed in the future when set backs occur.    -Patient has support from family with following up with his providers.    -Patient is considering completing POA paperwork today, however he states that he wants to review and discuss with his wife.     -Palliative Care IP team will follow up.     Primary Dx:  Chronic Systolic Heart Failure (NYHA Class II, AHA Class C); HTN; CKD    Ethical, Legal Decision-making capacity: Yes    Advance Care Planning/Goals of Care: Discussed today.  See notes above.    Care Coordination/Psychosocial/Spiritual Needs:  No spiritual distress or needs noted.      POA at Time of First Visit: No    POA at End of Visit: No    POLST at Time of Visit: No    POLST at End of Visit: No    LET Order at Time of Visit: No    LET Order at Time of Disch: No    Reason if No POA on Chart Prior to DC: REFUSED at this time.  Patient wishes to review form and discuss with his wife first.     Discharge Disposition: Disposition Not Yet Determined    Total combined time for today's Face to Face encounter was 140 minutes, with > 50% of that time spent counseling and coordinating care regarding the above.     Due to the development of diagnoses during an acute hospital stay, H&P and progress notes will not be shared in real-time with the patient to ensure that healthcare providers can do proper explanations and reviews. Pre-mature information may sometimes be harmful to the patient if not relayed in a compassionate and well-timed manner and most often are best relayed by the health care provider. No information is withheld from the  patient or family, and the patient and family may request all documentation after the patient is discharged once diagnoses and conditions have been confirmed.      Thank you for involving Palliative and Supportive Care.  Please contact the covering provider via [employer] with further questions or concerns.        Bharath Lee, STEPHENIE

## 2022-02-15 ENCOUNTER — APPOINTMENT (OUTPATIENT)
Dept: TRANSPLANT | Facility: CLINIC | Age: 67
End: 2022-02-15

## 2022-03-03 DIAGNOSIS — Z00.00 ENCOUNTER FOR GENERAL ADULT MEDICAL EXAMINATION W/OUT ABNORMAL FINDINGS: ICD-10-CM

## 2022-03-03 DIAGNOSIS — E78.5 HYPERLIPIDEMIA, UNSPECIFIED: ICD-10-CM

## 2022-03-08 RX ORDER — FUROSEMIDE 40 MG/1
40 TABLET ORAL DAILY
Refills: 0 | Status: ACTIVE | COMMUNITY
Start: 2022-02-10

## 2022-03-10 RX ORDER — EZETIMIBE 10 MG/1
10 TABLET ORAL
Qty: 30 | Refills: 5 | Status: ACTIVE | COMMUNITY
Start: 2021-02-04 | End: 1900-01-01

## 2022-03-10 RX ORDER — INSULIN GLARGINE 100 [IU]/ML
100 INJECTION, SOLUTION SUBCUTANEOUS
Qty: 1 | Refills: 2 | Status: ACTIVE | COMMUNITY
Start: 2020-08-04 | End: 1900-01-01

## 2022-03-12 NOTE — ED ADULT NURSE REASSESSMENT NOTE - COMFORT CARE
Patient given discharge instructions and able to teach back instructions.     wait time explained/plan of care explained

## 2022-03-17 ENCOUNTER — APPOINTMENT (OUTPATIENT)
Dept: ENDOCRINOLOGY | Facility: CLINIC | Age: 67
End: 2022-03-17

## 2022-03-22 NOTE — PHYSICAL THERAPY INITIAL EVALUATION ADULT - ASSISTIVE DEVICE, REHAB EVAL
HR=72 bpm, IESO=659/77 mmhg, SpO2=96.0 %, Resp=16 B/min, EtCO2=42 mmHg, Apnea=0 Seconds, Comment=sinus rhythm bed rails

## 2022-03-28 ENCOUNTER — LABORATORY RESULT (OUTPATIENT)
Age: 67
End: 2022-03-28

## 2022-03-29 ENCOUNTER — APPOINTMENT (OUTPATIENT)
Dept: HEPATOLOGY | Facility: CLINIC | Age: 67
End: 2022-03-29
Payer: MEDICARE

## 2022-03-29 VITALS
TEMPERATURE: 97.3 F | OXYGEN SATURATION: 96 % | WEIGHT: 243 LBS | SYSTOLIC BLOOD PRESSURE: 113 MMHG | HEART RATE: 87 BPM | RESPIRATION RATE: 16 BRPM | DIASTOLIC BLOOD PRESSURE: 62 MMHG | BODY MASS INDEX: 32.56 KG/M2 | HEIGHT: 72.5 IN

## 2022-03-29 DIAGNOSIS — E55.9 VITAMIN D DEFICIENCY, UNSPECIFIED: ICD-10-CM

## 2022-03-29 PROCEDURE — 99214 OFFICE O/P EST MOD 30 MIN: CPT

## 2022-03-29 RX ORDER — ERGOCALCIFEROL 1.25 MG/1
1.25 MG CAPSULE ORAL
Qty: 12 | Refills: 0 | Status: ACTIVE | COMMUNITY
Start: 2022-03-29 | End: 1900-01-01

## 2022-03-30 ENCOUNTER — OUTPATIENT (OUTPATIENT)
Dept: OUTPATIENT SERVICES | Facility: HOSPITAL | Age: 67
LOS: 1 days | Discharge: HOME | End: 2022-03-30

## 2022-03-30 DIAGNOSIS — Z90.49 ACQUIRED ABSENCE OF OTHER SPECIFIED PARTS OF DIGESTIVE TRACT: Chronic | ICD-10-CM

## 2022-03-30 DIAGNOSIS — D64.9 ANEMIA, UNSPECIFIED: ICD-10-CM

## 2022-03-30 LAB
25(OH)D3 SERPL-MCNC: 26.5 NG/ML
ALBUMIN SERPL ELPH-MCNC: 3.8 G/DL
ALP BLD-CCNC: 113 U/L
ALT SERPL-CCNC: 25 U/L
ANION GAP SERPL CALC-SCNC: 10 MMOL/L
AST SERPL-CCNC: 13 U/L
BASOPHILS # BLD AUTO: 0.02 K/UL
BASOPHILS NFR BLD AUTO: 0.7 %
BILIRUB SERPL-MCNC: 0.3 MG/DL
BUN SERPL-MCNC: 37 MG/DL
CALCIUM SERPL-MCNC: 8.8 MG/DL
CHLORIDE SERPL-SCNC: 110 MMOL/L
CHOLEST SERPL-MCNC: 143 MG/DL
CO2 SERPL-SCNC: 22 MMOL/L
CREAT SERPL-MCNC: 2.13 MG/DL
EGFR: 34 ML/MIN/1.73M2
EOSINOPHIL # BLD AUTO: 0.08 K/UL
EOSINOPHIL NFR BLD AUTO: 2.9 %
ESTIMATED AVERAGE GLUCOSE: 137 MG/DL
GGT SERPL-CCNC: 15 U/L
GLUCOSE SERPL-MCNC: 140 MG/DL
HBA1C MFR BLD HPLC: 6.4 %
HCT VFR BLD CALC: 26.5 %
HDLC SERPL-MCNC: 60 MG/DL
HGB BLD-MCNC: 8 G/DL
IMM GRANULOCYTES NFR BLD AUTO: 0.7 %
LDLC SERPL CALC-MCNC: 65 MG/DL
LYMPHOCYTES # BLD AUTO: 0.73 K/UL
LYMPHOCYTES NFR BLD AUTO: 26.1 %
MAGNESIUM SERPL-MCNC: 2.1 MG/DL
MAN DIFF?: NORMAL
MCHC RBC-ENTMCNC: 29 PG
MCHC RBC-ENTMCNC: 30.2 GM/DL
MCV RBC AUTO: 96 FL
MONOCYTES # BLD AUTO: 0.21 K/UL
MONOCYTES NFR BLD AUTO: 7.5 %
NEUTROPHILS # BLD AUTO: 1.74 K/UL
NEUTROPHILS NFR BLD AUTO: 62.1 %
NONHDLC SERPL-MCNC: 83 MG/DL
PHOSPHATE SERPL-MCNC: 3.9 MG/DL
PLATELET # BLD AUTO: 165 K/UL
POTASSIUM SERPL-SCNC: 4.2 MMOL/L
PROT SERPL-MCNC: 6.3 G/DL
RBC # BLD: 2.76 M/UL
RBC # FLD: 14.2 %
SODIUM SERPL-SCNC: 142 MMOL/L
TRIGL SERPL-MCNC: 87 MG/DL
WBC # FLD AUTO: 2.8 K/UL

## 2022-03-31 ENCOUNTER — RESULT REVIEW (OUTPATIENT)
Age: 67
End: 2022-03-31

## 2022-03-31 ENCOUNTER — APPOINTMENT (OUTPATIENT)
Dept: HEMATOLOGY ONCOLOGY | Facility: CLINIC | Age: 67
End: 2022-03-31
Payer: MEDICARE

## 2022-03-31 VITALS
DIASTOLIC BLOOD PRESSURE: 63 MMHG | OXYGEN SATURATION: 94 % | BODY MASS INDEX: 32.82 KG/M2 | SYSTOLIC BLOOD PRESSURE: 134 MMHG | HEIGHT: 72.5 IN | WEIGHT: 245 LBS | HEART RATE: 80 BPM

## 2022-03-31 LAB
BASOPHILS # BLD AUTO: 0 K/UL — SIGNIFICANT CHANGE UP (ref 0–0.2)
BASOPHILS NFR BLD AUTO: 1.1 % — SIGNIFICANT CHANGE UP (ref 0–2)
EOSINOPHIL # BLD AUTO: 0.1 K/UL — SIGNIFICANT CHANGE UP (ref 0–0.5)
EOSINOPHIL NFR BLD AUTO: 1.6 % — SIGNIFICANT CHANGE UP (ref 0–6)
EVEROLIMUS BLD-MCNC: 6.6 NG/ML
HCT VFR BLD CALC: 27.4 % — LOW (ref 39–50)
HGB BLD-MCNC: 8.6 G/DL — LOW (ref 13–17)
LYMPHOCYTES # BLD AUTO: 0.8 K/UL — LOW (ref 1–3.3)
LYMPHOCYTES # BLD AUTO: 21 % — SIGNIFICANT CHANGE UP (ref 13–44)
MCHC RBC-ENTMCNC: 29.6 PG — SIGNIFICANT CHANGE UP (ref 27–34)
MCHC RBC-ENTMCNC: 31.3 G/DL — LOW (ref 32–36)
MCV RBC AUTO: 94.5 FL — SIGNIFICANT CHANGE UP (ref 80–100)
MONOCYTES # BLD AUTO: 0.2 K/UL — SIGNIFICANT CHANGE UP (ref 0–0.9)
MONOCYTES NFR BLD AUTO: 5.6 % — SIGNIFICANT CHANGE UP (ref 2–14)
NEUTROPHILS # BLD AUTO: 2.6 K/UL — SIGNIFICANT CHANGE UP (ref 1.8–7.4)
NEUTROPHILS NFR BLD AUTO: 70.6 % — SIGNIFICANT CHANGE UP (ref 43–77)
PLATELET # BLD AUTO: 175 K/UL — SIGNIFICANT CHANGE UP (ref 150–400)
RBC # BLD: 2.9 M/UL — LOW (ref 4.2–5.8)
RBC # FLD: 14.2 % — SIGNIFICANT CHANGE UP (ref 10.3–14.5)
WBC # BLD: 3.7 K/UL — LOW (ref 3.8–10.5)
WBC # FLD AUTO: 3.7 K/UL — LOW (ref 3.8–10.5)

## 2022-03-31 PROCEDURE — 99204 OFFICE O/P NEW MOD 45 MIN: CPT

## 2022-03-31 NOTE — HISTORY OF PRESENT ILLNESS
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 Pt's Hb has been in the range of 8-9 since Jan 2021 on record. He has received multiple transfusions in the past. Taking iron supplement. Pt also has CKD4 following up with nephrology. Denies any blood in stool. Colonoscopy  a year a go was reportedly normal. Pt feels fatigue. Denies HA. CP, SOB, abd pain, constipation, diarrhea, melena, hematuria, dysuria.

## 2022-03-31 NOTE — ASSESSMENT
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 Pt's Hb has been in the range of 8-9 since Jan 2021 on record. He has received multiple transfusions in the past. Taking iron supplement. Pt also has CKD4 following up with nephrology. Denies any blood in stool. Colonoscopy  a year a go was reportedly normal. Pt feels fatigue. Denies HA. CP, SOB, abd pain, constipation, diarrhea, melena, hematuria, dysuria. \par \par \par # anemia\par -records reviewed\par -pt on eliquis for DVT \par -colonoscopy 2018 showed hemorrhoids, gastritis\par -will check B12, folate, iron studies with ferritin to r/o deficiencies\par -check erythropoietin levels given CKD4\par -pt may need epogen\par \par # DVT\par -LLE DVT on Eliquis \par \par will call pt with result\par \par \par f/u in 4 weeks

## 2022-03-31 NOTE — PHYSICAL EXAM
[Ambulatory and capable of all self care but unable to carry out any work activities] : Status 2- Ambulatory and capable of all self care but unable to carry out any work activities. Up and about more than 50% of waking hours [Normal] : affect appropriate [de-identified] : b/l LE edema 2+ [de-identified] : ecchymosis of b/l forearms

## 2022-04-05 ENCOUNTER — NON-APPOINTMENT (OUTPATIENT)
Age: 67
End: 2022-04-05

## 2022-04-05 LAB
ALBUMIN SERPL ELPH-MCNC: 4.1 G/DL
ALP BLD-CCNC: 102 U/L
ALT SERPL-CCNC: 31 U/L
ANION GAP SERPL CALC-SCNC: 13 MMOL/L
AST SERPL-CCNC: 24 U/L
BILIRUB SERPL-MCNC: 0.2 MG/DL
BUN SERPL-MCNC: 44 MG/DL
CALCIUM SERPL-MCNC: 9 MG/DL
CHLORIDE SERPL-SCNC: 111 MMOL/L
CO2 SERPL-SCNC: 20 MMOL/L
CREAT SERPL-MCNC: 2.19 MG/DL
EGFR: 32 ML/MIN/1.73M2
EPO SERPL-MCNC: 29.1 MIU/ML
FERRITIN SERPL-MCNC: 373 NG/ML
FOLATE SERPL-MCNC: >20 NG/ML
GLUCOSE SERPL-MCNC: 95 MG/DL
IRON SATN MFR SERPL: 33 %
IRON SERPL-MCNC: 65 UG/DL
POTASSIUM SERPL-SCNC: 4.7 MMOL/L
PROT SERPL-MCNC: 6.5 G/DL
RBC # BLD: 2.83 M/UL
RETICS # AUTO: 3.1 %
RETICS AGGREG/RBC NFR: 87.7 K/UL
SODIUM SERPL-SCNC: 144 MMOL/L
TIBC SERPL-MCNC: 197 UG/DL
UIBC SERPL-MCNC: 132 UG/DL
VIT B12 SERPL-MCNC: 760 PG/ML

## 2022-04-06 LAB — CMV DNA SPEC QL NAA+PROBE: 346 IU/ML

## 2022-04-08 NOTE — ED PROVIDER NOTE - FAMILY HISTORY
Patient returned call to [facility]. <<-----Click on this checkbox to enter Family History

## 2022-04-11 NOTE — ED ADULT TRIAGE NOTE - AS O2 DELIVERY
SURGERY FOLLOW-UP NOTE    CHIEF COMPLAINT:   Chief Complaint   Patient presents with   • Pre-Op Exam     Umbilical hernia without obstruction and without gangrene        HISTORY OF PRESENT ILLNESS:  Javan Garibay is a 46 year old male who presents today for re-evaluation of an umbilical hernia. He was last seen on 9/10/21. At that time, he stated the umbilical bulge has been present for at least the past 2-3 years.  His wife notes that it has been slowly increasing in size and he reports intermittent sharp pain at the hernia site.  It has always been reducible he denies any nausea, vomiting, or changes in his bowel habits.  He enjoys lifting weights, however he denies any constipation, diabetes, or chronic cough.  He is a nonsmoker    Interval History: Over the past 6 months, the hernia has become much more tender. Pain is now daily and interferes with sleep. Pain is sharp and severe when he hits the area on things by accident. Denies nausea, vomiting, or constipation. States it has felt more firm and harder to reduce.    MEDICATIONS:  Current Outpatient Medications   Medication Sig Dispense Refill   • ibuprofen (MOTRIN) 200 MG tablet Take 3-4 tablets by mouth daily.     • Acidophilus Lactobacillus Cap Take 1 capsule by mouth daily.       No current facility-administered medications for this visit.         ALLERGIES:  ALLERGIES:  No Known Allergies    REVIEW OF SYSTEMS:  Constitutional: There is no history of fevers or chills  Eyes: Patient denies blurred vision or photophobia  ENMT: No history of ear pain, tinnitus or epistaxis  Cardiovascular: Denies history of chest pain or palpitations  Respiratory: There is no history of shortness of breath, orthopnea or paroxysmal nocturnal dyspnea  Gastrointestinal: See HPI  Genitourinary: There is no history of dysuria or hematuria  Musculoskeletal: Denies history of new joint pain or swelling  Neurologic: No history of numbness, tingling or weakness  Skin: No history of  skin rashes  Psychiatric: Denies anxiety, depression, agitation and dependencies  All other systems that were reviewed are negative.    PHYSICAL EXAMINATION:  Vitals:    04/11/22 1059   BP: 112/76     GENERAL: This is a 46 year old male in no acute distress.  PSYCHIATRIC: He is awake, alert and oriented to time, place and person. Mood and affect are appropriate.  HEAD: Appears to be atraumatic and normocephalic.  EYES: Reveal no icterus, no scleral injection, no conjunctival pallor.  THROAT: Oropharyngeal exam reveals moist oral mucosa. There is no erythema, discharge or thrush. Dentition is good.  NECK: Supple and symmetric.   LUNGS: Reveals good effort and lungs are clear   HEART: Reveals presence of first and second heart sounds. Regular rate and rhythm.   ABDOMEN:  Soft and nontender. There is no rebound tenderness.  There is no hepatosplenomegaly. There is an incarcerated umbilical hernia that is unable to fully reduce secondary to pain in this setting  EXTREMITIES: No clubbing, cyanosis or edema bilaterally.  NEUROLOGIC: Cranial nerves II through XII appear grossly intact. Sensation is intact.  SKIN: Appears unremarkable and no rashes are present.    Labs:  Results for LONDON MENCHACA (MRN [id number]) as of 4/11/2022 07:48   Ref. Range 11/15/2021 07:44   Sodium Latest Ref Range: 135 - 145 mmol/L 143   Potassium Latest Ref Range: 3.4 - 5.1 mmol/L 4.0   Chloride Latest Ref Range: 98 - 107 mmol/L 105   CO2 Latest Ref Range: 21 - 32 mmol/L 29   ANION GAP Latest Ref Range: 10 - 20 mmol/L 13   Glucose Latest Ref Range: 70 - 99 mg/dL 92   BUN Latest Ref Range: 6 - 20 mg/dL 17   Creatinine Latest Ref Range: 0.67 - 1.17 mg/dL 1.29 (H)   Glomerular Filtration Rate Latest Ref Range: >=60  66   BUN/CREATININE RATIO Latest Ref Range: 7 - 25  13   CALCIUM Latest Ref Range: 8.4 - 10.2 mg/dL 8.8   TOTAL BILIRUBIN Latest Ref Range: 0.2 - 1.0 mg/dL 1.0   AST/SGOT Latest Ref Range: <=37 Units/L 19   ALT/SGPT Latest Ref Range: <64  Units/L 46   ALK PHOSPHATASE Latest Ref Range: 45 - 117 Units/L 65   Albumin Latest Ref Range: 3.6 - 5.1 g/dL 3.9         Diagnostics:  None new    Assessment and Plan:  Javan Garibay is a 45 year old male with no significant PMH with a symptomatic umbilical hernia.     Currently there is no evidence of obstruction but the hernia is now incarcerated. Due to its size and his ongoing symptoms, I recommend repair.     We discussed the natural course of ventral hernias including but not limited to the risk of incarceration or strangulation, obstruction, increase in size, and worsened pain.  We also discussed the risks and benefits of elective repair including the use of mesh.  Specifically, we discussed the risk of bleeding, infection, recurrence, postoperative pain, and injury to adjacent structures.  The patient understands and agrees to proceed with surgery.      - schedule OR for open umbilical hernia repair with mesh       Jesus Moscoso MD  4/11/2022 11:31 AM       room air

## 2022-04-12 NOTE — PROGRESS NOTE ADULT - PROBLEM SELECTOR PROBLEM 3
Diabetes mellitus type 2, controlled, with complications Helical Rim Advancement Flap Text: The defect edges were debeveled with a #15 blade scalpel.  Given the location of the defect and the proximity to free margins (helical rim) a double helical rim advancement flap was deemed most appropriate.  Using a sterile surgical marker, the appropriate advancement flaps were drawn incorporating the defect and placing the expected incisions between the helical rim and antihelix where possible.  The area thus outlined was incised through and through with a #15 scalpel blade.  With a skin hook and iris scissors, the flaps were gently and sharply undermined and freed up.

## 2022-04-13 RX ORDER — PEN NEEDLE, DIABETIC 29 G X1/2"
31G X 5 MM NEEDLE, DISPOSABLE MISCELLANEOUS
Qty: 4 | Refills: 3 | Status: ACTIVE | COMMUNITY
Start: 2020-05-26 | End: 1900-01-01

## 2022-04-19 NOTE — ED PROVIDER NOTE - PRINCIPAL DIAGNOSIS
FAMILY HISTORY:  Mother  Still living? Unknown  Family history of stroke, Age at diagnosis: Age Unknown    Grandparent  Still living? Unknown  Family history of stroke, Age at diagnosis: Age Unknown     Confusion

## 2022-04-25 ENCOUNTER — RESULT REVIEW (OUTPATIENT)
Age: 67
End: 2022-04-25

## 2022-04-25 ENCOUNTER — APPOINTMENT (OUTPATIENT)
Dept: HEMATOLOGY ONCOLOGY | Facility: CLINIC | Age: 67
End: 2022-04-25
Payer: MEDICARE

## 2022-04-25 VITALS
DIASTOLIC BLOOD PRESSURE: 75 MMHG | HEART RATE: 80 BPM | BODY MASS INDEX: 32.83 KG/M2 | OXYGEN SATURATION: 97 % | HEIGHT: 72.5 IN | WEIGHT: 245.02 LBS | SYSTOLIC BLOOD PRESSURE: 126 MMHG

## 2022-04-25 DIAGNOSIS — D75.89 OTHER SPECIFIED DISEASES OF BLOOD AND BLOOD-FORMING ORGANS: ICD-10-CM

## 2022-04-25 LAB
BASOPHILS # BLD AUTO: 0.1 K/UL — SIGNIFICANT CHANGE UP (ref 0–0.2)
BASOPHILS NFR BLD AUTO: 1.5 % — SIGNIFICANT CHANGE UP (ref 0–2)
EOSINOPHIL # BLD AUTO: 0 K/UL — SIGNIFICANT CHANGE UP (ref 0–0.5)
EOSINOPHIL NFR BLD AUTO: 1.3 % — SIGNIFICANT CHANGE UP (ref 0–6)
HCT VFR BLD CALC: 26.1 % — LOW (ref 39–50)
HGB BLD-MCNC: 8.4 G/DL — LOW (ref 13–17)
LYMPHOCYTES # BLD AUTO: 0.9 K/UL — LOW (ref 1–3.3)
LYMPHOCYTES # BLD AUTO: 24.2 % — SIGNIFICANT CHANGE UP (ref 13–44)
MCHC RBC-ENTMCNC: 30.5 PG — SIGNIFICANT CHANGE UP (ref 27–34)
MCHC RBC-ENTMCNC: 32.1 G/DL — SIGNIFICANT CHANGE UP (ref 32–36)
MCV RBC AUTO: 95 FL — SIGNIFICANT CHANGE UP (ref 80–100)
MONOCYTES # BLD AUTO: 0.3 K/UL — SIGNIFICANT CHANGE UP (ref 0–0.9)
MONOCYTES NFR BLD AUTO: 9 % — SIGNIFICANT CHANGE UP (ref 2–14)
NEUTROPHILS # BLD AUTO: 2.4 K/UL — SIGNIFICANT CHANGE UP (ref 1.8–7.4)
NEUTROPHILS NFR BLD AUTO: 63.9 % — SIGNIFICANT CHANGE UP (ref 43–77)
PLATELET # BLD AUTO: 190 K/UL — SIGNIFICANT CHANGE UP (ref 150–400)
RBC # BLD: 2.75 M/UL — LOW (ref 4.2–5.8)
RBC # FLD: 13.9 % — SIGNIFICANT CHANGE UP (ref 10.3–14.5)
WBC # BLD: 3.8 K/UL — SIGNIFICANT CHANGE UP (ref 3.8–10.5)
WBC # FLD AUTO: 3.8 K/UL — SIGNIFICANT CHANGE UP (ref 3.8–10.5)

## 2022-04-25 PROCEDURE — 99214 OFFICE O/P EST MOD 30 MIN: CPT

## 2022-04-25 NOTE — ASSESSMENT
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 Pt's Hb has been in the range of 8-9 since Jan 2021 on record. He has received multiple transfusions in the past. Taking iron supplement. Pt also has CKD4 following up with nephrology. Denies any blood in stool. Colonoscopy  a year a go was reportedly normal. Pt feels fatigue. Denies HA. CP, SOB, abd pain, constipation, diarrhea, melena, hematuria, dysuria. \par \par \par # anemia\par -records reviewed\par -pt on eliquis for DVT \par -colonoscopy 2018 showed hemorrhoids, gastritis\par -B12, folate, iron WNL. \par -Erythropoietin slightly elevated. Retic index 1.35 showed hypoproliferation likely due to cellcept and everolimus pt is taking for liver transplant\par -discussed with Dr. Medina (hepatology) for possible dose reduction of immunosuppressants \par -Pt may benefit from epogen injection. (14516 units subQ every 2 weeks)\par -continue iron supplement\par - reassess in 2 weeks after injection\par \par # MGUS\par -continue to monitor\par -will repeat immunoelectrophoresis next visit\par \par \par # DVT\par -LLE DVT on Eliquis \par \par \par \par \par f/u in 4 weeks

## 2022-04-25 NOTE — HISTORY OF PRESENT ILLNESS
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 Pt's Hb has been in the range of 8-9 since Jan 2021 on record. He has received multiple transfusions in the past. Taking iron supplement. Pt also has CKD4 following up with nephrology. Denies any blood in stool. Colonoscopy  a year a go was reportedly normal. Pt feels fatigue. Denies HA. CP, SOB, abd pain, constipation, diarrhea, melena, hematuria, dysuria.  [de-identified] : 4/25/22: Prashant is here for follw up accompanied by his son. He complains of feeling fatigue. Sleeps up to 12 hrs a day. B12, folate, iron WNL. Erythropoetin slightly elevated. Retic index 1.35 showed hypoproliferation likely due to everolimus and cellcept for liver transplant. Pt may benefit from epo injection. Will d/w Dr. Medina. Denies HA. CP, SOB, abd pain, constipation, diarrhea, melena, hematuria, dysuria.

## 2022-04-25 NOTE — PHYSICAL EXAM
[Ambulatory and capable of all self care but unable to carry out any work activities] : Status 2- Ambulatory and capable of all self care but unable to carry out any work activities. Up and about more than 50% of waking hours [Normal] : affect appropriate [de-identified] : b/l LE edema 2+ [de-identified] : ecchymosis of b/l forearms

## 2022-04-26 LAB
ALBUMIN SERPL ELPH-MCNC: 4 G/DL
ALP BLD-CCNC: 112 U/L
ALT SERPL-CCNC: 46 U/L
ANION GAP SERPL CALC-SCNC: 10 MMOL/L
AST SERPL-CCNC: 25 U/L
BILIRUB SERPL-MCNC: 0.2 MG/DL
BUN SERPL-MCNC: 34 MG/DL
CALCIUM SERPL-MCNC: 8.7 MG/DL
CHLORIDE SERPL-SCNC: 109 MMOL/L
CO2 SERPL-SCNC: 23 MMOL/L
CREAT SERPL-MCNC: 2.19 MG/DL
EGFR: 32 ML/MIN/1.73M2
FERRITIN SERPL-MCNC: 304 NG/ML
GLUCOSE SERPL-MCNC: 125 MG/DL
IRON SATN MFR SERPL: 40 %
IRON SERPL-MCNC: 69 UG/DL
POTASSIUM SERPL-SCNC: 4.5 MMOL/L
PROT SERPL-MCNC: 6.2 G/DL
RBC # BLD: 2.8 M/UL
RETICS # AUTO: 3.2 %
RETICS AGGREG/RBC NFR: 90.7 K/UL
SODIUM SERPL-SCNC: 141 MMOL/L
TIBC SERPL-MCNC: 173 UG/DL
UIBC SERPL-MCNC: 104 UG/DL

## 2022-05-03 LAB — EPO SERPL-MCNC: 43 MIU/ML

## 2022-05-04 ENCOUNTER — OUTPATIENT (OUTPATIENT)
Dept: OUTPATIENT SERVICES | Facility: HOSPITAL | Age: 67
LOS: 1 days | Discharge: HOME | End: 2022-05-04

## 2022-05-04 DIAGNOSIS — D64.9 ANEMIA, UNSPECIFIED: ICD-10-CM

## 2022-05-04 DIAGNOSIS — Z90.49 ACQUIRED ABSENCE OF OTHER SPECIFIED PARTS OF DIGESTIVE TRACT: Chronic | ICD-10-CM

## 2022-05-09 ENCOUNTER — APPOINTMENT (OUTPATIENT)
Dept: TRANSPLANT | Facility: CLINIC | Age: 67
End: 2022-05-09

## 2022-05-09 VITALS
SYSTOLIC BLOOD PRESSURE: 118 MMHG | WEIGHT: 248 LBS | BODY MASS INDEX: 33.23 KG/M2 | DIASTOLIC BLOOD PRESSURE: 66 MMHG | HEART RATE: 86 BPM | OXYGEN SATURATION: 89 % | HEIGHT: 72.5 IN | RESPIRATION RATE: 14 BRPM | TEMPERATURE: 98 F

## 2022-05-09 PROCEDURE — 99214 OFFICE O/P EST MOD 30 MIN: CPT

## 2022-05-09 NOTE — HISTORY OF PRESENT ILLNESS
[Nonalcoholic Steatohepatitis (JEAN)] : Nonalcoholic Steatohepatitis (JEAN) [Liver] : Liver [PHS Increased Risk] : no Public Health Service increased risk [Hepatitis C (DANA+)] : no hepatitis c (DANA+) [Hepatitis B Core Ab Positive] : not hepatitis B core Ab positive [FreeTextEntry1] : Prashant Segundo is a 66 yr old PMHx JEAN Cirrhosis s/p DDLT (7/2/2020, c/b post-op VRE bacteremia), h/o R. heel OM (12/2020),\par \par PMHx of IDDM, HLD, obesity, HFpEF with mild LV diastolic  dysfunction, MGUS, chronic anemia with a history of duodenal ulcer as well as GAVE and duodenal AVM s/p APC (last on 10/11/19), 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 \par \par Admitted from 12/20/21 to 1/6/22 s/p fall at home, no LOC or injuries. Found to be +Covid w/ CT chest findings concerning for beginnings of multifocal PNA. Received remdesivir and mononclonal antibodies and on dexamethasone (12/29-1/7). 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 PCP prophylaxis changed from bactrim to atovaquone for LFTs. Endocrine consulted for steroid induced hyperglycemia. Patient was found to have DVT on L femoral vein and started on full treatment AC, now on Eliquis 5mg BID. \par \par \par Following with Endo last A1 C 6.4\par \par Activity with cane \par \par Maintained on eliquis 5mg BID for left femoral vein DVT. \par \par He reports some significant weight gain. \par \par Current Immuno: Everolimus 4mg BID, MMF 500mg BID, Prednisone 5mg daily \par \par Labs 4/25/2022 \par TB 0.2, AST 25, ALT 46, , Cr 2.19 eGFR 32\par WBC 3.8, H/H 8.4/26.1, \par Everolimus 6.6

## 2022-05-09 NOTE — ASSESSMENT
[FreeTextEntry1] : 66 yr old PMHx JEAN Cirrhosis s/p DDLT (7/2/2020, c/b post-op VRE bacteremia), h/o R. heel OM (12/2020), with multiple comorbidities including IDDM, HLD and MGUS. \par \par S/p hospitalization [facility] 12/2021-1/2022 s/p fall at home, found to be +Covid w/ CT chest findings concerning for multifocal PNA. Received remdesivir and mononclonal antibodies and on dexamethasone (12/29-1/7). Also underwent liver biopsy due to uptrend in LFTs, no signs of rejection. \par Also w/ DVT on L femoral vein on Eliquis 5mg, hematology to determine length of therapy. \par \par Everolimus 4mg BID, MMF 500mg BID, Prednisone 5mg daily \par \par \par Plan: \par -Labs today \par -Will decrease MMF to 250 BID given anemia, eventually will d/c\par -check CMV with labs, previously detected level, low viral load on valcyte\par -Refer to Dermatology for annual check up \par -pt to have monthly labs and continued follow up with hepatology \par \par

## 2022-05-09 NOTE — PLAN
[FreeTextEntry1] : He is doing well. \par \par LFTs have been normal. \par \par Remains anemic,  Will decrease cellcept to 250 bid, everolimus, pred 5 (Dr. Medina will begin to wean). \par \par Will follow up with Dr. Medina on a monthly basis. \par \par

## 2022-05-11 ENCOUNTER — RESULT REVIEW (OUTPATIENT)
Age: 67
End: 2022-05-11

## 2022-05-11 ENCOUNTER — APPOINTMENT (OUTPATIENT)
Age: 67
End: 2022-05-11

## 2022-05-11 LAB
ANISOCYTOSIS BLD QL: SLIGHT — SIGNIFICANT CHANGE UP
BASOPHILS # BLD AUTO: 0.1 K/UL — SIGNIFICANT CHANGE UP (ref 0–0.2)
BASOPHILS NFR BLD AUTO: 2 % — SIGNIFICANT CHANGE UP (ref 0–2)
BURR CELLS BLD QL SMEAR: PRESENT — SIGNIFICANT CHANGE UP
ELLIPTOCYTES BLD QL SMEAR: SLIGHT — SIGNIFICANT CHANGE UP
EOSINOPHIL # BLD AUTO: 0.1 K/UL — SIGNIFICANT CHANGE UP (ref 0–0.5)
EOSINOPHIL NFR BLD AUTO: 1 % — SIGNIFICANT CHANGE UP (ref 0–6)
HCT VFR BLD CALC: 23.4 % — LOW (ref 39–50)
HGB BLD-MCNC: 7.5 G/DL — LOW (ref 13–17)
LYMPHOCYTES # BLD AUTO: 1.1 K/UL — SIGNIFICANT CHANGE UP (ref 1–3.3)
LYMPHOCYTES # BLD AUTO: 28 % — SIGNIFICANT CHANGE UP (ref 13–44)
MACROCYTES BLD QL: SLIGHT — SIGNIFICANT CHANGE UP
MCHC RBC-ENTMCNC: 30.3 PG — SIGNIFICANT CHANGE UP (ref 27–34)
MCHC RBC-ENTMCNC: 32 G/DL — SIGNIFICANT CHANGE UP (ref 32–36)
MCV RBC AUTO: 94.5 FL — SIGNIFICANT CHANGE UP (ref 80–100)
MICROCYTES BLD QL: SLIGHT — SIGNIFICANT CHANGE UP
MONOCYTES # BLD AUTO: 0.3 K/UL — SIGNIFICANT CHANGE UP (ref 0–0.9)
MONOCYTES NFR BLD AUTO: 5 % — SIGNIFICANT CHANGE UP (ref 2–14)
NEUTROPHILS # BLD AUTO: 2.4 K/UL — SIGNIFICANT CHANGE UP (ref 1.8–7.4)
NEUTROPHILS NFR BLD AUTO: 64 % — SIGNIFICANT CHANGE UP (ref 43–77)
OVALOCYTES BLD QL SMEAR: SLIGHT — SIGNIFICANT CHANGE UP
PLAT MORPH BLD: NORMAL — SIGNIFICANT CHANGE UP
PLATELET # BLD AUTO: 178 K/UL — SIGNIFICANT CHANGE UP (ref 150–400)
POIKILOCYTOSIS BLD QL AUTO: SLIGHT — SIGNIFICANT CHANGE UP
POLYCHROMASIA BLD QL SMEAR: SLIGHT — SIGNIFICANT CHANGE UP
RBC # BLD: 2.48 M/UL — LOW (ref 4.2–5.8)
RBC # FLD: 14.3 % — SIGNIFICANT CHANGE UP (ref 10.3–14.5)
RBC BLD AUTO: SIGNIFICANT CHANGE UP
WBC # BLD: 4 K/UL — SIGNIFICANT CHANGE UP (ref 3.8–10.5)
WBC # FLD AUTO: 4 K/UL — SIGNIFICANT CHANGE UP (ref 3.8–10.5)

## 2022-05-12 DIAGNOSIS — Z94.4 LIVER TRANSPLANT STATUS: ICD-10-CM

## 2022-05-12 DIAGNOSIS — D61.9 APLASTIC ANEMIA, UNSPECIFIED: ICD-10-CM

## 2022-05-12 DIAGNOSIS — D75.89 OTHER SPECIFIED DISEASES OF BLOOD AND BLOOD-FORMING ORGANS: ICD-10-CM

## 2022-05-12 DIAGNOSIS — E11.9 TYPE 2 DIABETES MELLITUS WITHOUT COMPLICATIONS: ICD-10-CM

## 2022-05-13 RX ORDER — PREDNISONE 5 MG/1
5 TABLET ORAL
Qty: 90 | Refills: 3 | Status: ACTIVE | COMMUNITY
Start: 2020-07-21 | End: 1900-01-01

## 2022-05-16 ENCOUNTER — APPOINTMENT (OUTPATIENT)
Dept: DERMATOLOGY | Facility: CLINIC | Age: 67
End: 2022-05-16
Payer: MEDICARE

## 2022-05-16 DIAGNOSIS — D22.9 MELANOCYTIC NEVI, UNSPECIFIED: ICD-10-CM

## 2022-05-16 DIAGNOSIS — D69.2 OTHER NONTHROMBOCYTOPENIC PURPURA: ICD-10-CM

## 2022-05-16 DIAGNOSIS — B36.0 PITYRIASIS VERSICOLOR: ICD-10-CM

## 2022-05-16 DIAGNOSIS — L30.4 ERYTHEMA INTERTRIGO: ICD-10-CM

## 2022-05-16 DIAGNOSIS — L57.0 ACTINIC KERATOSIS: ICD-10-CM

## 2022-05-16 PROCEDURE — 17003 DESTRUCT PREMALG LES 2-14: CPT

## 2022-05-16 PROCEDURE — 99203 OFFICE O/P NEW LOW 30 MIN: CPT | Mod: 25

## 2022-05-16 PROCEDURE — 17000 DESTRUCT PREMALG LESION: CPT

## 2022-05-16 RX ORDER — KETOCONAZOLE 20 MG/G
2 CREAM TOPICAL
Qty: 1 | Refills: 2 | Status: ACTIVE | COMMUNITY
Start: 2022-05-16 | End: 1900-01-01

## 2022-05-16 RX ORDER — KETOCONAZOLE 20.5 MG/ML
2 SHAMPOO, SUSPENSION TOPICAL
Qty: 1 | Refills: 3 | Status: ACTIVE | COMMUNITY
Start: 2022-05-16 | End: 1900-01-01

## 2022-05-16 RX ORDER — HYDROCORTISONE 25 MG/G
2.5 CREAM TOPICAL
Qty: 30 | Refills: 2 | Status: ACTIVE | COMMUNITY
Start: 2022-05-16 | End: 1900-01-01

## 2022-05-17 LAB
ALBUMIN SERPL ELPH-MCNC: 3.8 G/DL
ALP BLD-CCNC: 115 U/L
ALT SERPL-CCNC: 51 U/L
ANION GAP SERPL CALC-SCNC: 10 MMOL/L
AST SERPL-CCNC: 36 U/L
BASOPHILS # BLD AUTO: 0.03 K/UL
BASOPHILS NFR BLD AUTO: 0.9 %
BILIRUB SERPL-MCNC: 0.2 MG/DL
BUN SERPL-MCNC: 37 MG/DL
CALCIUM SERPL-MCNC: 8.7 MG/DL
CHLORIDE SERPL-SCNC: 110 MMOL/L
CMV DNA SPEC QL NAA+PROBE: NOT DETECTED IU/ML
CMVPCR LOG: NOT DETECTED LOG10IU/ML
CO2 SERPL-SCNC: 21 MMOL/L
CREAT SERPL-MCNC: 1.82 MG/DL
EGFR: 40 ML/MIN/1.73M2
EOSINOPHIL # BLD AUTO: 0.09 K/UL
EOSINOPHIL NFR BLD AUTO: 2.6 %
EVEROLIMUS BLD-MCNC: 4.8 NG/ML
GLUCOSE SERPL-MCNC: 230 MG/DL
HCT VFR BLD CALC: 24 %
HGB BLD-MCNC: 7.3 G/DL
IMM GRANULOCYTES NFR BLD AUTO: 0.9 %
LYMPHOCYTES # BLD AUTO: 0.89 K/UL
LYMPHOCYTES NFR BLD AUTO: 25.8 %
MAGNESIUM SERPL-MCNC: 2.1 MG/DL
MAN DIFF?: NORMAL
MCHC RBC-ENTMCNC: 28.9 PG
MCHC RBC-ENTMCNC: 30.4 GM/DL
MCV RBC AUTO: 94.9 FL
MONOCYTES # BLD AUTO: 0.24 K/UL
MONOCYTES NFR BLD AUTO: 7 %
NEUTROPHILS # BLD AUTO: 2.17 K/UL
NEUTROPHILS NFR BLD AUTO: 62.8 %
PLATELET # BLD AUTO: 189 K/UL
POTASSIUM SERPL-SCNC: 4.3 MMOL/L
PROT SERPL-MCNC: 6 G/DL
RBC # BLD: 2.53 M/UL
RBC # FLD: 14.6 %
SODIUM SERPL-SCNC: 142 MMOL/L
WBC # FLD AUTO: 3.45 K/UL

## 2022-05-26 ENCOUNTER — APPOINTMENT (OUTPATIENT)
Age: 67
End: 2022-05-26

## 2022-05-27 ENCOUNTER — OUTPATIENT (OUTPATIENT)
Dept: OUTPATIENT SERVICES | Facility: HOSPITAL | Age: 67
LOS: 1 days | Discharge: HOME | End: 2022-05-27

## 2022-05-27 DIAGNOSIS — Z90.49 ACQUIRED ABSENCE OF OTHER SPECIFIED PARTS OF DIGESTIVE TRACT: Chronic | ICD-10-CM

## 2022-05-27 DIAGNOSIS — D64.9 ANEMIA, UNSPECIFIED: ICD-10-CM

## 2022-05-29 ENCOUNTER — TRANSCRIPTION ENCOUNTER (OUTPATIENT)
Age: 67
End: 2022-05-29

## 2022-06-06 ENCOUNTER — RESULT REVIEW (OUTPATIENT)
Age: 67
End: 2022-06-06

## 2022-06-06 ENCOUNTER — APPOINTMENT (OUTPATIENT)
Age: 67
End: 2022-06-06

## 2022-06-06 LAB
BASOPHILS # BLD AUTO: 0.1 K/UL — SIGNIFICANT CHANGE UP (ref 0–0.2)
BASOPHILS NFR BLD AUTO: 1.9 % — SIGNIFICANT CHANGE UP (ref 0–2)
EOSINOPHIL # BLD AUTO: 0 K/UL — SIGNIFICANT CHANGE UP (ref 0–0.5)
EOSINOPHIL NFR BLD AUTO: 0.9 % — SIGNIFICANT CHANGE UP (ref 0–6)
HCT VFR BLD CALC: 29.5 % — LOW (ref 39–50)
HGB BLD-MCNC: 9.7 G/DL — LOW (ref 13–17)
LYMPHOCYTES # BLD AUTO: 0.8 K/UL — LOW (ref 1–3.3)
LYMPHOCYTES # BLD AUTO: 18.6 % — SIGNIFICANT CHANGE UP (ref 13–44)
MCHC RBC-ENTMCNC: 30.3 PG — SIGNIFICANT CHANGE UP (ref 27–34)
MCHC RBC-ENTMCNC: 32.8 G/DL — SIGNIFICANT CHANGE UP (ref 32–36)
MCV RBC AUTO: 92.2 FL — SIGNIFICANT CHANGE UP (ref 80–100)
MONOCYTES # BLD AUTO: 0.2 K/UL — SIGNIFICANT CHANGE UP (ref 0–0.9)
MONOCYTES NFR BLD AUTO: 4.8 % — SIGNIFICANT CHANGE UP (ref 2–14)
NEUTROPHILS # BLD AUTO: 3.1 K/UL — SIGNIFICANT CHANGE UP (ref 1.8–7.4)
NEUTROPHILS NFR BLD AUTO: 73.9 % — SIGNIFICANT CHANGE UP (ref 43–77)
PLATELET # BLD AUTO: 160 K/UL — SIGNIFICANT CHANGE UP (ref 150–400)
RBC # BLD: 3.2 M/UL — LOW (ref 4.2–5.8)
RBC # FLD: 12.8 % — SIGNIFICANT CHANGE UP (ref 10.3–14.5)
WBC # BLD: 4.2 K/UL — SIGNIFICANT CHANGE UP (ref 3.8–10.5)
WBC # FLD AUTO: 4.2 K/UL — SIGNIFICANT CHANGE UP (ref 3.8–10.5)

## 2022-06-06 NOTE — H&P ADULT - NSHPPOAPRESSUREULCER_GEN_ALL_CORE
Thank you for coming to our Emergency Department today. It is important to remember that some problems are difficult to diagnose and may not be found during your first visit. Be sure to follow up with your primary care doctor.  If you do not have one, you may contact the one listed on your discharge paperwork or you may also call the [facility] Appointment Desk at [phone] to schedule an appointment with one.     Return to the ER with any questions/concerns, new/concerning symptoms, worsening or failure to improve. Do not drive or make any important decisions for 24 hours if you have received any pain medications, sedatives or mood altering drugs during your ER visit.   no

## 2022-06-07 DIAGNOSIS — D61.9 APLASTIC ANEMIA, UNSPECIFIED: ICD-10-CM

## 2022-06-07 DIAGNOSIS — Z94.4 LIVER TRANSPLANT STATUS: ICD-10-CM

## 2022-06-07 DIAGNOSIS — D75.89 OTHER SPECIFIED DISEASES OF BLOOD AND BLOOD-FORMING ORGANS: ICD-10-CM

## 2022-06-07 DIAGNOSIS — E11.9 TYPE 2 DIABETES MELLITUS WITHOUT COMPLICATIONS: ICD-10-CM

## 2022-06-07 RX ORDER — EVEROLIMUS 1 MG/1
1 TABLET ORAL TWICE DAILY
Qty: 720 | Refills: 3 | Status: ACTIVE | COMMUNITY
Start: 2021-03-10 | End: 1900-01-01

## 2022-06-15 ENCOUNTER — EMERGENCY (EMERGENCY)
Facility: HOSPITAL | Age: 67
LOS: 1 days | Discharge: ANOTHER HOSPITAL | End: 2022-06-15
Attending: EMERGENCY MEDICINE | Admitting: EMERGENCY MEDICINE
Payer: MEDICARE

## 2022-06-15 VITALS
SYSTOLIC BLOOD PRESSURE: 114 MMHG | WEIGHT: 240.08 LBS | HEIGHT: 73 IN | HEART RATE: 80 BPM | DIASTOLIC BLOOD PRESSURE: 60 MMHG | OXYGEN SATURATION: 96 % | TEMPERATURE: 96 F | RESPIRATION RATE: 18 BRPM

## 2022-06-15 DIAGNOSIS — Z90.49 ACQUIRED ABSENCE OF OTHER SPECIFIED PARTS OF DIGESTIVE TRACT: Chronic | ICD-10-CM

## 2022-06-15 LAB
ALBUMIN SERPL ELPH-MCNC: 3.5 G/DL — SIGNIFICANT CHANGE UP (ref 3.3–5)
ALP SERPL-CCNC: 115 U/L — SIGNIFICANT CHANGE UP (ref 40–120)
ALT FLD-CCNC: 159 U/L — HIGH (ref 12–78)
ANION GAP SERPL CALC-SCNC: 8 MMOL/L — SIGNIFICANT CHANGE UP (ref 5–17)
APPEARANCE UR: ABNORMAL
APTT BLD: 31.7 SEC — SIGNIFICANT CHANGE UP (ref 27.5–35.5)
AST SERPL-CCNC: 55 U/L — HIGH (ref 15–37)
BACTERIA # UR AUTO: ABNORMAL
BASOPHILS # BLD AUTO: 0.02 K/UL — SIGNIFICANT CHANGE UP (ref 0–0.2)
BASOPHILS NFR BLD AUTO: 0.4 % — SIGNIFICANT CHANGE UP (ref 0–2)
BILIRUB SERPL-MCNC: 0.5 MG/DL — SIGNIFICANT CHANGE UP (ref 0.2–1.2)
BILIRUB UR-MCNC: NEGATIVE — SIGNIFICANT CHANGE UP
BUN SERPL-MCNC: 52 MG/DL — HIGH (ref 7–23)
CALCIUM SERPL-MCNC: 9.1 MG/DL — SIGNIFICANT CHANGE UP (ref 8.5–10.1)
CHLORIDE SERPL-SCNC: 110 MMOL/L — HIGH (ref 96–108)
CK SERPL-CCNC: 496 U/L — HIGH (ref 26–308)
CO2 SERPL-SCNC: 20 MMOL/L — LOW (ref 22–31)
COLOR SPEC: YELLOW — SIGNIFICANT CHANGE UP
CREAT SERPL-MCNC: 2.2 MG/DL — HIGH (ref 0.5–1.3)
DIFF PNL FLD: ABNORMAL
EGFR: 32 ML/MIN/1.73M2 — LOW
EOSINOPHIL # BLD AUTO: 0.01 K/UL — SIGNIFICANT CHANGE UP (ref 0–0.5)
EOSINOPHIL NFR BLD AUTO: 0.2 % — SIGNIFICANT CHANGE UP (ref 0–6)
EPI CELLS # UR: SIGNIFICANT CHANGE UP
GLUCOSE SERPL-MCNC: 363 MG/DL — HIGH (ref 70–99)
GLUCOSE UR QL: 1000 MG/DL
HCT VFR BLD CALC: 30.5 % — LOW (ref 39–50)
HGB BLD-MCNC: 10.4 G/DL — LOW (ref 13–17)
IMM GRANULOCYTES NFR BLD AUTO: 1.7 % — HIGH (ref 0–1.5)
INR BLD: 1.41 RATIO — HIGH (ref 0.88–1.16)
KETONES UR-MCNC: NEGATIVE — SIGNIFICANT CHANGE UP
LEUKOCYTE ESTERASE UR-ACNC: ABNORMAL
LIDOCAIN IGE QN: 62 U/L — LOW (ref 73–393)
LYMPHOCYTES # BLD AUTO: 0.63 K/UL — LOW (ref 1–3.3)
LYMPHOCYTES # BLD AUTO: 11.7 % — LOW (ref 13–44)
MCHC RBC-ENTMCNC: 30.5 PG — SIGNIFICANT CHANGE UP (ref 27–34)
MCHC RBC-ENTMCNC: 34.1 GM/DL — SIGNIFICANT CHANGE UP (ref 32–36)
MCV RBC AUTO: 89.4 FL — SIGNIFICANT CHANGE UP (ref 80–100)
MONOCYTES # BLD AUTO: 0.37 K/UL — SIGNIFICANT CHANGE UP (ref 0–0.9)
MONOCYTES NFR BLD AUTO: 6.9 % — SIGNIFICANT CHANGE UP (ref 2–14)
NEUTROPHILS # BLD AUTO: 4.25 K/UL — SIGNIFICANT CHANGE UP (ref 1.8–7.4)
NEUTROPHILS NFR BLD AUTO: 79.1 % — HIGH (ref 43–77)
NITRITE UR-MCNC: NEGATIVE — SIGNIFICANT CHANGE UP
NRBC # BLD: 0 /100 WBCS — SIGNIFICANT CHANGE UP (ref 0–0)
PH UR: 5 — SIGNIFICANT CHANGE UP (ref 5–8)
PLATELET # BLD AUTO: 156 K/UL — SIGNIFICANT CHANGE UP (ref 150–400)
POTASSIUM SERPL-MCNC: 4.5 MMOL/L — SIGNIFICANT CHANGE UP (ref 3.5–5.3)
POTASSIUM SERPL-SCNC: 4.5 MMOL/L — SIGNIFICANT CHANGE UP (ref 3.5–5.3)
PROT SERPL-MCNC: 7.4 G/DL — SIGNIFICANT CHANGE UP (ref 6–8.3)
PROT UR-MCNC: 100
PROTHROM AB SERPL-ACNC: 16.5 SEC — HIGH (ref 10.5–13.4)
RBC # BLD: 3.41 M/UL — LOW (ref 4.2–5.8)
RBC # FLD: 13.7 % — SIGNIFICANT CHANGE UP (ref 10.3–14.5)
RBC CASTS # UR COMP ASSIST: ABNORMAL /HPF (ref 0–4)
SARS-COV-2 RNA SPEC QL NAA+PROBE: SIGNIFICANT CHANGE UP
SODIUM SERPL-SCNC: 138 MMOL/L — SIGNIFICANT CHANGE UP (ref 135–145)
SP GR SPEC: 1.02 — SIGNIFICANT CHANGE UP (ref 1.01–1.02)
UROBILINOGEN FLD QL: NEGATIVE — SIGNIFICANT CHANGE UP
WBC # BLD: 5.37 K/UL — SIGNIFICANT CHANGE UP (ref 3.8–10.5)
WBC # FLD AUTO: 5.37 K/UL — SIGNIFICANT CHANGE UP (ref 3.8–10.5)
WBC UR QL: ABNORMAL

## 2022-06-15 PROCEDURE — 71045 X-RAY EXAM CHEST 1 VIEW: CPT | Mod: 26

## 2022-06-15 PROCEDURE — 74176 CT ABD & PELVIS W/O CONTRAST: CPT | Mod: 26,QQ

## 2022-06-15 PROCEDURE — 73502 X-RAY EXAM HIP UNI 2-3 VIEWS: CPT | Mod: 26,LT

## 2022-06-15 PROCEDURE — 71250 CT THORAX DX C-: CPT | Mod: 26,MA

## 2022-06-15 PROCEDURE — 70450 CT HEAD/BRAIN W/O DYE: CPT | Mod: 26,MA

## 2022-06-15 PROCEDURE — 99285 EMERGENCY DEPT VISIT HI MDM: CPT

## 2022-06-15 PROCEDURE — 93010 ELECTROCARDIOGRAM REPORT: CPT

## 2022-06-15 PROCEDURE — 72125 CT NECK SPINE W/O DYE: CPT | Mod: 26,MA

## 2022-06-15 RX ORDER — SODIUM CHLORIDE 9 MG/ML
500 INJECTION INTRAMUSCULAR; INTRAVENOUS; SUBCUTANEOUS ONCE
Refills: 0 | Status: COMPLETED | OUTPATIENT
Start: 2022-06-15 | End: 2022-06-15

## 2022-06-15 RX ORDER — ACETAMINOPHEN 500 MG
1000 TABLET ORAL ONCE
Refills: 0 | Status: COMPLETED | OUTPATIENT
Start: 2022-06-15 | End: 2022-06-15

## 2022-06-15 RX ORDER — ONDANSETRON 8 MG/1
4 TABLET, FILM COATED ORAL ONCE
Refills: 0 | Status: COMPLETED | OUTPATIENT
Start: 2022-06-15 | End: 2022-06-15

## 2022-06-15 RX ORDER — CEFTRIAXONE 500 MG/1
1000 INJECTION, POWDER, FOR SOLUTION INTRAMUSCULAR; INTRAVENOUS ONCE
Refills: 0 | Status: COMPLETED | OUTPATIENT
Start: 2022-06-15 | End: 2022-06-15

## 2022-06-15 RX ADMIN — ONDANSETRON 4 MILLIGRAM(S): 8 TABLET, FILM COATED ORAL at 16:45

## 2022-06-15 RX ADMIN — CEFTRIAXONE 1000 MILLIGRAM(S): 500 INJECTION, POWDER, FOR SOLUTION INTRAMUSCULAR; INTRAVENOUS at 20:20

## 2022-06-15 RX ADMIN — SODIUM CHLORIDE 500 MILLILITER(S): 9 INJECTION INTRAMUSCULAR; INTRAVENOUS; SUBCUTANEOUS at 16:44

## 2022-06-15 RX ADMIN — Medication 400 MILLIGRAM(S): at 16:55

## 2022-06-15 RX ADMIN — CEFTRIAXONE 100 MILLIGRAM(S): 500 INJECTION, POWDER, FOR SOLUTION INTRAMUSCULAR; INTRAVENOUS at 19:52

## 2022-06-15 NOTE — ED ADULT TRIAGE NOTE - CHIEF COMPLAINT QUOTE
Per pt's son pt had a fall outside his shower. Pt's son states pt was on the floor for 3 hours. Pt's son states that pt had a fall yesterday too and might have hit his head. Pt states he is on blood thinners. Pt states he is nauseous

## 2022-06-15 NOTE — ED PROVIDER NOTE - OBJECTIVE STATEMENT
pt c/o 3 falls past 24 hrs with head neck and left sided pain. pt was on floor for 3 hrs this am after last fall until was able to crawl to phone. + mild nausea on way to er. no loc, dizziness, vomiting, weakness, numbness, sob, arm pain.  pmd -ramez

## 2022-06-15 NOTE — ED ADULT NURSE NOTE - NSIMPLEMENTINTERV_GEN_ALL_ED
Implemented All Fall with Harm Risk Interventions:  [employer] to call system. Call bell, personal items and telephone within reach. Instruct patient to call for assistance. Room bathroom lighting operational. Non-slip footwear when patient is off stretcher. Physically safe environment: no spills, clutter or unnecessary equipment. Stretcher in lowest position, wheels locked, appropriate side rails in place. Provide visual cue, wrist band, yellow gown, etc. Monitor gait and stability. Monitor for mental status changes and reorient to person, place, and time. Review medications for side effects contributing to fall risk. Reinforce activity limits and safety measures with patient and family. Provide visual clues: red socks.

## 2022-06-15 NOTE — ED ADULT NURSE NOTE - OBJECTIVE STATEMENT
Received pt alert and oriented x4.  C/o fall at home today. Pt son found patient on floor who was there for 3 hrs.  Pt son with patient and reports that his father has had multiple falls since yesterday for a total of 3.  Pt today explains that the curtain teddy fell on his head and he fell.  Pt has multiple skin tears to left arm as well as right hand.  Pt also explains that he's been weaker since Friday but denies fevers, chills.  Denies SOB, chest pain.  Does report left side/rib pain where he fell.  Denies LOC, dizziness.

## 2022-06-15 NOTE — ED PROVIDER NOTE - CLINICAL SUMMARY MEDICAL DECISION MAKING FREE TEXT BOX
pt c/o 3 falls past 24 hrs, pain left head, neck left side, nausea. plan - ekg/xr/ct/labs/ivf/zofran/iv tylenol

## 2022-06-15 NOTE — ED PROVIDER NOTE - PROGRESS NOTE DETAILS
d/w edwin (uro) he recommends d/w [place] xfer center. called xfer center [facility]aris/w dagher ([facility] xplant service) he will accept xfer to floor

## 2022-06-16 ENCOUNTER — INPATIENT (INPATIENT)
Facility: HOSPITAL | Age: 67
LOS: 12 days | Discharge: NURSING, REHAB OR LONG-TERM CARE | DRG: 690 | End: 2022-06-29
Attending: TRANSPLANT SURGERY | Admitting: TRANSPLANT SURGERY
Payer: MEDICARE

## 2022-06-16 VITALS
DIASTOLIC BLOOD PRESSURE: 85 MMHG | RESPIRATION RATE: 18 BRPM | SYSTOLIC BLOOD PRESSURE: 167 MMHG | WEIGHT: 234.13 LBS | HEART RATE: 84 BPM | TEMPERATURE: 99 F | OXYGEN SATURATION: 96 %

## 2022-06-16 VITALS
SYSTOLIC BLOOD PRESSURE: 142 MMHG | RESPIRATION RATE: 18 BRPM | DIASTOLIC BLOOD PRESSURE: 80 MMHG | TEMPERATURE: 98 F | OXYGEN SATURATION: 96 % | HEART RATE: 88 BPM

## 2022-06-16 DIAGNOSIS — N20.0 CALCULUS OF KIDNEY: ICD-10-CM

## 2022-06-16 DIAGNOSIS — Z94.4 LIVER TRANSPLANT STATUS: ICD-10-CM

## 2022-06-16 DIAGNOSIS — Z90.49 ACQUIRED ABSENCE OF OTHER SPECIFIED PARTS OF DIGESTIVE TRACT: Chronic | ICD-10-CM

## 2022-06-16 LAB
ALBUMIN SERPL ELPH-MCNC: 3.9 G/DL — SIGNIFICANT CHANGE UP (ref 3.3–5)
ALP SERPL-CCNC: 113 U/L — SIGNIFICANT CHANGE UP (ref 40–120)
ALT FLD-CCNC: 96 U/L — HIGH (ref 10–45)
ANION GAP SERPL CALC-SCNC: 10 MMOL/L — SIGNIFICANT CHANGE UP (ref 5–17)
APTT BLD: 29.7 SEC — SIGNIFICANT CHANGE UP (ref 27.5–35.5)
AST SERPL-CCNC: 29 U/L — SIGNIFICANT CHANGE UP (ref 10–40)
BASOPHILS # BLD AUTO: 0.02 K/UL — SIGNIFICANT CHANGE UP (ref 0–0.2)
BASOPHILS NFR BLD AUTO: 0.5 % — SIGNIFICANT CHANGE UP (ref 0–2)
BILIRUB SERPL-MCNC: 0.4 MG/DL — SIGNIFICANT CHANGE UP (ref 0.2–1.2)
BUN SERPL-MCNC: 47 MG/DL — HIGH (ref 7–23)
CALCIUM SERPL-MCNC: 9.1 MG/DL — SIGNIFICANT CHANGE UP (ref 8.4–10.5)
CHLORIDE SERPL-SCNC: 108 MMOL/L — SIGNIFICANT CHANGE UP (ref 96–108)
CO2 SERPL-SCNC: 21 MMOL/L — LOW (ref 22–31)
CREAT SERPL-MCNC: 2.02 MG/DL — HIGH (ref 0.5–1.3)
CULTURE RESULTS: SIGNIFICANT CHANGE UP
EGFR: 36 ML/MIN/1.73M2 — LOW
EOSINOPHIL # BLD AUTO: 0.03 K/UL — SIGNIFICANT CHANGE UP (ref 0–0.5)
EOSINOPHIL NFR BLD AUTO: 0.7 % — SIGNIFICANT CHANGE UP (ref 0–6)
GLUCOSE BLDC GLUCOMTR-MCNC: 246 MG/DL — HIGH (ref 70–99)
GLUCOSE BLDC GLUCOMTR-MCNC: 249 MG/DL — HIGH (ref 70–99)
GLUCOSE BLDC GLUCOMTR-MCNC: 338 MG/DL — HIGH (ref 70–99)
GLUCOSE BLDC GLUCOMTR-MCNC: 343 MG/DL — HIGH (ref 70–99)
GLUCOSE SERPL-MCNC: 253 MG/DL — HIGH (ref 70–99)
HCT VFR BLD CALC: 28.8 % — LOW (ref 39–50)
HGB BLD-MCNC: 9.6 G/DL — LOW (ref 13–17)
IMM GRANULOCYTES NFR BLD AUTO: 1.2 % — SIGNIFICANT CHANGE UP (ref 0–1.5)
INR BLD: 1.35 RATIO — HIGH (ref 0.88–1.16)
LYMPHOCYTES # BLD AUTO: 0.71 K/UL — LOW (ref 1–3.3)
LYMPHOCYTES # BLD AUTO: 17.6 % — SIGNIFICANT CHANGE UP (ref 13–44)
MAGNESIUM SERPL-MCNC: 2.1 MG/DL — SIGNIFICANT CHANGE UP (ref 1.6–2.6)
MCHC RBC-ENTMCNC: 30 PG — SIGNIFICANT CHANGE UP (ref 27–34)
MCHC RBC-ENTMCNC: 33.3 GM/DL — SIGNIFICANT CHANGE UP (ref 32–36)
MCV RBC AUTO: 90 FL — SIGNIFICANT CHANGE UP (ref 80–100)
MONOCYTES # BLD AUTO: 0.28 K/UL — SIGNIFICANT CHANGE UP (ref 0–0.9)
MONOCYTES NFR BLD AUTO: 6.9 % — SIGNIFICANT CHANGE UP (ref 2–14)
NEUTROPHILS # BLD AUTO: 2.94 K/UL — SIGNIFICANT CHANGE UP (ref 1.8–7.4)
NEUTROPHILS NFR BLD AUTO: 73.1 % — SIGNIFICANT CHANGE UP (ref 43–77)
NRBC # BLD: 0 /100 WBCS — SIGNIFICANT CHANGE UP (ref 0–0)
PHOSPHATE SERPL-MCNC: 3 MG/DL — SIGNIFICANT CHANGE UP (ref 2.5–4.5)
PLATELET # BLD AUTO: 142 K/UL — LOW (ref 150–400)
POTASSIUM SERPL-MCNC: 4.4 MMOL/L — SIGNIFICANT CHANGE UP (ref 3.5–5.3)
POTASSIUM SERPL-SCNC: 4.4 MMOL/L — SIGNIFICANT CHANGE UP (ref 3.5–5.3)
PROT SERPL-MCNC: 6.9 G/DL — SIGNIFICANT CHANGE UP (ref 6–8.3)
PROTHROM AB SERPL-ACNC: 15.6 SEC — HIGH (ref 10.5–13.4)
RBC # BLD: 3.2 M/UL — LOW (ref 4.2–5.8)
RBC # FLD: 13.5 % — SIGNIFICANT CHANGE UP (ref 10.3–14.5)
SODIUM SERPL-SCNC: 139 MMOL/L — SIGNIFICANT CHANGE UP (ref 135–145)
SPECIMEN SOURCE: SIGNIFICANT CHANGE UP
WBC # BLD: 4.03 K/UL — SIGNIFICANT CHANGE UP (ref 3.8–10.5)
WBC # FLD AUTO: 4.03 K/UL — SIGNIFICANT CHANGE UP (ref 3.8–10.5)

## 2022-06-16 PROCEDURE — 81001 URINALYSIS AUTO W/SCOPE: CPT

## 2022-06-16 PROCEDURE — 96365 THER/PROPH/DIAG IV INF INIT: CPT

## 2022-06-16 PROCEDURE — 99232 SBSQ HOSP IP/OBS MODERATE 35: CPT

## 2022-06-16 PROCEDURE — 70450 CT HEAD/BRAIN W/O DYE: CPT | Mod: MA

## 2022-06-16 PROCEDURE — 73502 X-RAY EXAM HIP UNI 2-3 VIEWS: CPT

## 2022-06-16 PROCEDURE — 80053 COMPREHEN METABOLIC PANEL: CPT

## 2022-06-16 PROCEDURE — U0003: CPT

## 2022-06-16 PROCEDURE — 85025 COMPLETE CBC W/AUTO DIFF WBC: CPT

## 2022-06-16 PROCEDURE — 96376 TX/PRO/DX INJ SAME DRUG ADON: CPT

## 2022-06-16 PROCEDURE — 83690 ASSAY OF LIPASE: CPT

## 2022-06-16 PROCEDURE — 85730 THROMBOPLASTIN TIME PARTIAL: CPT

## 2022-06-16 PROCEDURE — U0005: CPT

## 2022-06-16 PROCEDURE — 72125 CT NECK SPINE W/O DYE: CPT | Mod: MA

## 2022-06-16 PROCEDURE — 96375 TX/PRO/DX INJ NEW DRUG ADDON: CPT

## 2022-06-16 PROCEDURE — 85610 PROTHROMBIN TIME: CPT

## 2022-06-16 PROCEDURE — 99222 1ST HOSP IP/OBS MODERATE 55: CPT

## 2022-06-16 PROCEDURE — 71250 CT THORAX DX C-: CPT | Mod: MA

## 2022-06-16 PROCEDURE — 74176 CT ABD & PELVIS W/O CONTRAST: CPT | Mod: QQ

## 2022-06-16 PROCEDURE — 93005 ELECTROCARDIOGRAM TRACING: CPT

## 2022-06-16 PROCEDURE — 99285 EMERGENCY DEPT VISIT HI MDM: CPT | Mod: 25

## 2022-06-16 PROCEDURE — 71045 X-RAY EXAM CHEST 1 VIEW: CPT

## 2022-06-16 PROCEDURE — 82550 ASSAY OF CK (CPK): CPT

## 2022-06-16 PROCEDURE — 36415 COLL VENOUS BLD VENIPUNCTURE: CPT

## 2022-06-16 PROCEDURE — 87086 URINE CULTURE/COLONY COUNT: CPT

## 2022-06-16 RX ORDER — INSULIN LISPRO 100/ML
6 VIAL (ML) SUBCUTANEOUS
Refills: 0 | Status: DISCONTINUED | OUTPATIENT
Start: 2022-06-16 | End: 2022-06-20

## 2022-06-16 RX ORDER — INSULIN LISPRO 100/ML
VIAL (ML) SUBCUTANEOUS ONCE
Refills: 0 | Status: COMPLETED | OUTPATIENT
Start: 2022-06-17 | End: 2022-06-17

## 2022-06-16 RX ORDER — INSULIN LISPRO 100/ML
VIAL (ML) SUBCUTANEOUS AT BEDTIME
Refills: 0 | Status: DISCONTINUED | OUTPATIENT
Start: 2022-06-16 | End: 2022-06-29

## 2022-06-16 RX ORDER — DEXTROSE 50 % IN WATER 50 %
25 SYRINGE (ML) INTRAVENOUS ONCE
Refills: 0 | Status: DISCONTINUED | OUTPATIENT
Start: 2022-06-16 | End: 2022-06-29

## 2022-06-16 RX ORDER — MYCOPHENOLATE MOFETIL 250 MG/1
250 CAPSULE ORAL
Refills: 0 | Status: DISCONTINUED | OUTPATIENT
Start: 2022-06-16 | End: 2022-06-29

## 2022-06-16 RX ORDER — SERTRALINE 25 MG/1
100 TABLET, FILM COATED ORAL DAILY
Refills: 0 | Status: DISCONTINUED | OUTPATIENT
Start: 2022-06-16 | End: 2022-06-29

## 2022-06-16 RX ORDER — LIDOCAINE 4 G/100G
1 CREAM TOPICAL DAILY
Refills: 0 | Status: DISCONTINUED | OUTPATIENT
Start: 2022-06-16 | End: 2022-06-17

## 2022-06-16 RX ORDER — INSULIN LISPRO 100/ML
VIAL (ML) SUBCUTANEOUS AT BEDTIME
Refills: 0 | Status: DISCONTINUED | OUTPATIENT
Start: 2022-06-16 | End: 2022-06-16

## 2022-06-16 RX ORDER — EVEROLIMUS 10 MG/1
4 TABLET ORAL
Refills: 0 | Status: DISCONTINUED | OUTPATIENT
Start: 2022-06-16 | End: 2022-06-16

## 2022-06-16 RX ORDER — VALGANCICLOVIR 450 MG/1
450 TABLET, FILM COATED ORAL DAILY
Refills: 0 | Status: DISCONTINUED | OUTPATIENT
Start: 2022-06-16 | End: 2022-06-17

## 2022-06-16 RX ORDER — OMEGA-3 ACID ETHYL ESTERS 1 G
1 CAPSULE ORAL
Refills: 0 | Status: DISCONTINUED | OUTPATIENT
Start: 2022-06-16 | End: 2022-06-29

## 2022-06-16 RX ORDER — SODIUM CHLORIDE 9 MG/ML
1000 INJECTION, SOLUTION INTRAVENOUS
Refills: 0 | Status: DISCONTINUED | OUTPATIENT
Start: 2022-06-16 | End: 2022-06-29

## 2022-06-16 RX ORDER — INSULIN GLARGINE 100 [IU]/ML
8 INJECTION, SOLUTION SUBCUTANEOUS AT BEDTIME
Refills: 0 | Status: DISCONTINUED | OUTPATIENT
Start: 2022-06-16 | End: 2022-06-20

## 2022-06-16 RX ORDER — ATORVASTATIN CALCIUM 80 MG/1
80 TABLET, FILM COATED ORAL AT BEDTIME
Refills: 0 | Status: DISCONTINUED | OUTPATIENT
Start: 2022-06-16 | End: 2022-06-21

## 2022-06-16 RX ORDER — GLUCAGON INJECTION, SOLUTION 0.5 MG/.1ML
1 INJECTION, SOLUTION SUBCUTANEOUS ONCE
Refills: 0 | Status: DISCONTINUED | OUTPATIENT
Start: 2022-06-16 | End: 2022-06-29

## 2022-06-16 RX ORDER — HEPARIN SODIUM 5000 [USP'U]/ML
5000 INJECTION INTRAVENOUS; SUBCUTANEOUS EVERY 12 HOURS
Refills: 0 | Status: DISCONTINUED | OUTPATIENT
Start: 2022-06-16 | End: 2022-06-29

## 2022-06-16 RX ORDER — DEXTROSE 50 % IN WATER 50 %
15 SYRINGE (ML) INTRAVENOUS ONCE
Refills: 0 | Status: DISCONTINUED | OUTPATIENT
Start: 2022-06-16 | End: 2022-06-29

## 2022-06-16 RX ORDER — SODIUM CHLORIDE 9 MG/ML
1000 INJECTION INTRAMUSCULAR; INTRAVENOUS; SUBCUTANEOUS
Refills: 0 | Status: DISCONTINUED | OUTPATIENT
Start: 2022-06-16 | End: 2022-06-18

## 2022-06-16 RX ORDER — TAMSULOSIN HYDROCHLORIDE 0.4 MG/1
0.4 CAPSULE ORAL AT BEDTIME
Refills: 0 | Status: DISCONTINUED | OUTPATIENT
Start: 2022-06-16 | End: 2022-06-28

## 2022-06-16 RX ORDER — INSULIN LISPRO 100/ML
VIAL (ML) SUBCUTANEOUS
Refills: 0 | Status: DISCONTINUED | OUTPATIENT
Start: 2022-06-16 | End: 2022-06-16

## 2022-06-16 RX ORDER — ACETAMINOPHEN 500 MG
1000 TABLET ORAL ONCE
Refills: 0 | Status: COMPLETED | OUTPATIENT
Start: 2022-06-16 | End: 2022-06-16

## 2022-06-16 RX ORDER — FOLIC ACID 0.8 MG
1 TABLET ORAL DAILY
Refills: 0 | Status: DISCONTINUED | OUTPATIENT
Start: 2022-06-16 | End: 2022-06-29

## 2022-06-16 RX ORDER — PANTOPRAZOLE SODIUM 20 MG/1
40 TABLET, DELAYED RELEASE ORAL
Refills: 0 | Status: DISCONTINUED | OUTPATIENT
Start: 2022-06-16 | End: 2022-06-29

## 2022-06-16 RX ORDER — CEFTRIAXONE 500 MG/1
1000 INJECTION, POWDER, FOR SOLUTION INTRAMUSCULAR; INTRAVENOUS EVERY 24 HOURS
Refills: 0 | Status: COMPLETED | OUTPATIENT
Start: 2022-06-16 | End: 2022-06-18

## 2022-06-16 RX ORDER — INSULIN LISPRO 100/ML
VIAL (ML) SUBCUTANEOUS
Refills: 0 | Status: DISCONTINUED | OUTPATIENT
Start: 2022-06-16 | End: 2022-06-29

## 2022-06-16 RX ORDER — EVEROLIMUS 10 MG/1
4 TABLET ORAL
Refills: 0 | Status: DISCONTINUED | OUTPATIENT
Start: 2022-06-16 | End: 2022-06-17

## 2022-06-16 RX ORDER — DEXTROSE 50 % IN WATER 50 %
12.5 SYRINGE (ML) INTRAVENOUS ONCE
Refills: 0 | Status: DISCONTINUED | OUTPATIENT
Start: 2022-06-16 | End: 2022-06-29

## 2022-06-16 RX ADMIN — Medication 2: at 14:34

## 2022-06-16 RX ADMIN — LIDOCAINE 1 PATCH: 4 CREAM TOPICAL at 23:30

## 2022-06-16 RX ADMIN — Medication 1 MILLIGRAM(S): at 11:12

## 2022-06-16 RX ADMIN — VALGANCICLOVIR 450 MILLIGRAM(S): 450 TABLET, FILM COATED ORAL at 11:12

## 2022-06-16 RX ADMIN — Medication 2: at 09:03

## 2022-06-16 RX ADMIN — HEPARIN SODIUM 5000 UNIT(S): 5000 INJECTION INTRAVENOUS; SUBCUTANEOUS at 21:21

## 2022-06-16 RX ADMIN — LIDOCAINE 1 PATCH: 4 CREAM TOPICAL at 11:12

## 2022-06-16 RX ADMIN — EVEROLIMUS 4 MILLIGRAM(S): 10 TABLET ORAL at 09:05

## 2022-06-16 RX ADMIN — Medication 4: at 21:58

## 2022-06-16 RX ADMIN — PANTOPRAZOLE SODIUM 40 MILLIGRAM(S): 20 TABLET, DELAYED RELEASE ORAL at 09:02

## 2022-06-16 RX ADMIN — LIDOCAINE 1 PATCH: 4 CREAM TOPICAL at 18:02

## 2022-06-16 RX ADMIN — SODIUM CHLORIDE 75 MILLILITER(S): 9 INJECTION INTRAMUSCULAR; INTRAVENOUS; SUBCUTANEOUS at 10:48

## 2022-06-16 RX ADMIN — SODIUM CHLORIDE 75 MILLILITER(S): 9 INJECTION INTRAMUSCULAR; INTRAVENOUS; SUBCUTANEOUS at 21:59

## 2022-06-16 RX ADMIN — Medication 400 MILLIGRAM(S): at 04:33

## 2022-06-16 RX ADMIN — TAMSULOSIN HYDROCHLORIDE 0.4 MILLIGRAM(S): 0.4 CAPSULE ORAL at 20:28

## 2022-06-16 RX ADMIN — SERTRALINE 100 MILLIGRAM(S): 25 TABLET, FILM COATED ORAL at 11:12

## 2022-06-16 RX ADMIN — LIDOCAINE 1 PATCH: 4 CREAM TOPICAL at 18:17

## 2022-06-16 RX ADMIN — EVEROLIMUS 4 MILLIGRAM(S): 10 TABLET ORAL at 20:27

## 2022-06-16 RX ADMIN — ATORVASTATIN CALCIUM 80 MILLIGRAM(S): 80 TABLET, FILM COATED ORAL at 20:28

## 2022-06-16 RX ADMIN — MYCOPHENOLATE MOFETIL 250 MILLIGRAM(S): 250 CAPSULE ORAL at 18:10

## 2022-06-16 RX ADMIN — CEFTRIAXONE 100 MILLIGRAM(S): 500 INJECTION, POWDER, FOR SOLUTION INTRAMUSCULAR; INTRAVENOUS at 09:03

## 2022-06-16 RX ADMIN — INSULIN GLARGINE 8 UNIT(S): 100 INJECTION, SOLUTION SUBCUTANEOUS at 21:58

## 2022-06-16 RX ADMIN — Medication 4: at 18:01

## 2022-06-16 RX ADMIN — Medication 1 GRAM(S): at 18:03

## 2022-06-16 RX ADMIN — Medication 5 MILLIGRAM(S): at 09:04

## 2022-06-16 NOTE — CONSULT NOTE ADULT - ASSESSMENT
66M w/ PMHx JEAN cirrhosis s/p OLT 7/2/20 (course c/b VRE bacteremia, CNI toxicity to both tacro and cyclosporine; switched to everolimus 7/27/20), HLD, IDDM, obesity, HFpEF presenting w/ fall and ureteral stone to [facility] on 6/15/22, now transferred to [facility] for further management.     Impression:   #Fall: now second admission for falls  #Ureteral stone c/b hydronephrosis   #Elevated liver enzymes: liver biopsy 12/30/21 without e/o rejection, found to have NRH  #JEAN cirrhosis s/p LT  #CMV viremia   #DM  #Anemia      Recommendations:  - evaluation of ureteral stone/management per urology   - immunosuppression: everolimus 4 mg BID (dose by level), and CellCept 250 mg BID, and prednisone 5mg daily  - prophylactic dose of Valcyte 450 mg daily  - will need to discuss with ID re: remaining on valcyte ppx      Thank you for involving us in the care of this patient, please reach out if any further questions.     Ky Ram MD  Gastroenterology/Hepatology Fellow, PGY5    Available on [employer] Teams  [phone] ([facility])  [phone] ([facility])  Please contact on call fellow weekdays after 5pm-7am and weekends: [phone]

## 2022-06-16 NOTE — H&P ADULT - NSHPREVIEWOFSYSTEMS_GEN_ALL_CORE
Review of systems  Gen: No weight changes, fatigue, fevers/chills, weakness  Skin: No rashes  Head/Eyes/Ears/Mouth: No headache; Normal hearing; Normal vision w/o blurriness; No sinus pain/discomfort, sore throat  Respiratory: No dyspnea, cough, wheezing, hemoptysis  CV: No chest pain, PND, orthopnea  GI: C/O mild abdominal pain at surgical site, No diarrhea, constipation, nausea, vomiting, melena, hematochezia  : No increased frequency, dysuria, hematuria, nocturia  MSK: No joint pain/swelling; no back pain; no edema  Neuro: No dizziness/lightheadedness, weakness, seizures, numbness, tingling  Heme: No easy bruising or bleeding  Endo: No heat/cold intolerance  Psych: No significant nervousness, anxiety, stress, depression  All other systems were reviewed and are negative, except as noted. Review of systems  Gen: No weight changes, fatigue, fevers/chills, weakness  Skin: No rashes, +ecchymosis, +skin tears  Head/Eyes/Ears/Mouth: No headache; Normal hearing; Normal vision w/o blurriness; No sinus pain/discomfort, sore throat  Respiratory: No dyspnea, cough, wheezing, hemoptysis  CV: No chest pain, PND, orthopnea  GI:No abd pain, diarrhea, constipation, nausea, vomiting, melena, hematochezia  : No increased frequency, dysuria, hematuria, nocturia  MSK: No joint pain/swelling; +left sided pain, + back pain; no edema  Neuro: No dizziness/lightheadedness, weakness, seizures, numbness, tingling  Endo: No heat/cold intolerance  Psych: No significant nervousness, anxiety, stress, depression  All other systems were reviewed and are negative, except as noted.

## 2022-06-16 NOTE — PHYSICAL THERAPY INITIAL EVALUATION ADULT - ADDITIONAL COMMENTS
Patient lives in pvt house with son, ramp+ to enter.  Patient ambulated with straight cane independent indoor. Outdoor mobility using RW. pt owns w/c and hospital bed at home.

## 2022-06-16 NOTE — H&P ADULT - NSHPPHYSICALEXAM_GEN_ALL_CORE
PHYSICAL EXAM:  Constitutional: Well developed / well nourished  Eyes: Anicteric, PERRLA  ENMT: nc/at  Neck: supple  Respiratory: CTA B/L  Cardiovascular: RRR  Gastrointestinal: Soft,NT,ND  Genitourinary: Voiding spontaneously  Extremities: SCD's in place and working bilaterally  Vascular: Palpable dp pulses bilaterally  Neurological: A&O x3  Skin:  Musculoskeletal: Moving all extremities  Psychiatric: Responsive PHYSICAL EXAM:  Constitutional: Well developed / well nourished  Eyes: Anicteric, PERRLA  ENMT: nc/at  Neck: supple  Respiratory: CTA B/L  Cardiovascular: RRR  Gastrointestinal: Soft,NT,ND  Genitourinary: Voiding spontaneously  Extremities: SCD's in place and working bilaterally, +1 edema BL LE noted   Vascular: Palpable dp pulses bilaterally  Neurological: A&O x3  Skin: Gen skin tears, Buttocks MAD No rashes noted,   Musculoskeletal: Moving all extremities  Psychiatric: Responsive

## 2022-06-16 NOTE — CONSULT NOTE ADULT - ASSESSMENT
66 year old male s/p fall found incidentally to have a 5mm Left distal ureteral stone with hydro  - monitor creatinine; baseline appears to be around 1.8, creatinine downtrending  - urine culture  - continue tamsulosin, which can aid in stone passage  - encourate hydration  - strain urine  - no acute  intervention at this time, unless patient becomes febrile, or creatinine not improving

## 2022-06-16 NOTE — H&P ADULT - NSHPLABSRESULTS_GEN_ALL_CORE
Vital Signs Last 24 Hrs  T(C): 37.1 (16 Jun 2022 06:19), Max: 37.1 (16 Jun 2022 02:04)  T(F): 98.8 (16 Jun 2022 06:19), Max: 98.8 (16 Jun 2022 02:04)  HR: 84 (16 Jun 2022 06:19) (80 - 90)  BP: 167/85 (16 Jun 2022 06:19) (114/60 - 167/85)  BP(mean): --  RR: 18 (16 Jun 2022 06:19) (18 - 18)  SpO2: 96% (16 Jun 2022 06:19) (91% - 96%)      LABS:                        10.4   5.37  )-----------( 156      ( 15 Rachid 2022 16:37 )             30.5     06-15    138  |  110<H>  |  52<H>  ----------------------------<  363<H>  4.5   |  20<L>  |  2.20<H>    Ca    9.1      15 Rachid 2022 16:37    TPro  7.4  /  Alb  3.5  /  TBili  0.5  /  DBili  x   /  AST  55<H>  /  ALT  159<H>  /  AlkPhos  115  06-15    PT/INR - ( 15 Rachid 2022 16:37 )   PT: 16.5 sec;   INR: 1.41 ratio         PTT - ( 15 Rachid 2022 16:37 )  PTT:31.7 sec Vital Signs Last 24 Hrs  T(C): 37.1 (16 Jun 2022 06:19), Max: 37.1 (16 Jun 2022 02:04)  T(F): 98.8 (16 Jun 2022 06:19), Max: 98.8 (16 Jun 2022 02:04)  HR: 84 (16 Jun 2022 06:19) (80 - 90)  BP: 167/85 (16 Jun 2022 06:19) (114/60 - 167/85)  BP(mean): --  RR: 18 (16 Jun 2022 06:19) (18 - 18)  SpO2: 96% (16 Jun 2022 06:19) (91% - 96%)      LABS:                        10.4   5.37  )-----------( 156      ( 15 Rachid 2022 16:37 )             30.5     06-15    138  |  110<H>  |  52<H>  ----------------------------<  363<H>  4.5   |  20<L>  |  2.20<H>    Ca    9.1      15 Rachid 2022 16:37    TPro  7.4  /  Alb  3.5  /  TBili  0.5  /  DBili  x   /  AST  55<H>  /  ALT  159<H>  /  AlkPhos  115  06-15    PT/INR - ( 15 Rachid 2022 16:37 )   PT: 16.5 sec;   INR: 1.41 ratio         PTT - ( 15 Rachid 2022 16:37 )  PTT:31.7 sec    Radiology:  	  INTERPRETATION:  CLINICAL INFORMATION: Found on the floor. History of   falls. Diabetes fatty liver renal stones and cholecystectomy    COMPARISON: CT chest of 12/28/2021 and CT chest and abdomen of 12/23/2020.    CONTRAST/COMPLICATIONS:  IV Contrast: NONE  0 cc administered   0 cc discarded  Oral Contrast: NONE  Complications: None reported at time of study completion    PROCEDURE:  CT of the Chest, Abdomen and Pelvis was performed.  Sagittal and coronal reformats were performed.    FINDINGS:  CHEST:  LUNGS AND LARGE AIRWAYS: Patent central airways. No pulmonary nodules.   There is patchy density in the left upper lobe posteriorly which may   represent small area of infiltrate versus atelectasis. There is also   linear atelectasis or scarring at the left lung base  PLEURA: No pleural effusion.  VESSELS: Atherosclerotic changes of the aorta and coronary arteries.  HEART: Heart size is normal. No pericardial effusion.  MEDIASTINUM AND RONNIE: No lymphadenopathy.  CHEST WALL AND LOWER NECK: Within normal limits.    ABDOMEN AND PELVIS:  LIVER: Within normal limits.  BILE DUCTS: Normal caliber.  GALLBLADDER: Cholecystectomy.  SPLEEN: Within normal limits.  PANCREAS: Within normal limits.  ADRENALS: Within normal limits.  KIDNEYS/URETERS: There is new mild left hydroureter and hydronephrosis to   the level of the distal ureteral calculus seen on images 143 2/1/1945   measuring 5 mm.    BLADDER: Mild wall thickening appears slightly asymmetric to the left.  REPRODUCTIVE ORGANS: Prostate within normal limits.    BOWEL: No bowel obstruction. Appendix is normal.  PERITONEUM: No ascites.  VESSELS: Atherosclerotic changes.  RETROPERITONEUM/LYMPH NODES: No lymphadenopathy.  ABDOMINAL WALL: Within normal limits.  BONES: Degenerative changes. There are subacute/chronic appearing rib   fractures involving the right eighth and ninth ribs not seen on the   previous study. There is a nondisplaced probable acute fracture of the   left seventh anterior rib    IMPRESSION:  Acute nondisplaced left seventh anterior rib fracture  Small area of linear atelectasis versus infiltrate in the posterior left   upper lobe  5 mm distal left ureteral calculus causing hydroureteronephrosis  Mild bladder wall thickening appearing more prominent on the left.

## 2022-06-16 NOTE — PATIENT PROFILE ADULT - FALL HARM RISK - TYPE OF ASSESSMENT
Patient:   ELIA CRISOSTOMO            MRN: [id number]            FIN: [id number]               Age:   60 years     Sex:  FEMALE     :  57   Associated Diagnoses:   Dementia; Schizophrenia; Altered mental status; Altered mental status   Author:   ALTHEA WALKER      Chief Complaint   17 00:00           unresponsive in Nsg home     Additional Info   not able to give any history.   was recently [facility]zed in the psych hospital      History of Present Illness             The patient presents with no wittnessed suizures.        Review of Systems   Constitutional:  Weakness, Decreased activity.    Eye:  Negative.    Ear/Nose/Mouth/Throat:  Negative.    Cardiovascular:  Negative.    Respiratory:  Negative.    Gastrointestinal:  Negative.    Genitourinary:  Negative.    Musculoskeletal:  Negative.    Integumentary:  Negative.    Hematology/Lymphatics:  Negative.    Neurologic:  Abnormal balance, Confusion.    Endocrine:  Negative.    Allergy/Immunologic:  Negative.    Psychiatric:  Anxiety, Nora, Delusional.    All other systems All other systems are negative.     Histories   Past Med History: Past Medical History   Dementia  Dysphagia  OA (osteoarthritis)  Schizophrenia     Family History:    Cardiac arrhythmia  MOTHER  FATHER  Peripheral vascular disease  MOTHER  FATHER  Ischemic heart disease  MOTHER  FATHER  Stroke  MOTHER  Myocardial infarction  MOTHER  FATHER  Depression  MOTHER  Hypertension  MOTHER  FATHER  Hyperlipidemia  MOTHER  FATHER  Congestive heart failure  MOTHER  FATHER  Heart disease  MOTHER  FATHER  Prostate cancer  FATHER     Procedure History:    Operative procedure on lower leg (1981).   Social History        Alcohol  Details: Use: None.  Details: Use: None.  Home/Environment  Details: Alcohol Abuse in Household: No.  Substance Abuse in Household: No.  Smoker in Household: No.  Living Situation: Extended Care Facility.  Substance Abuse  Details: Use: None.  Details: Use:  None.  Tobacco  Details: Smoked/Smokeless Tobacco Last 30 Days: No.  Use: Never smoker.  Details: Smoked/Smokeless Tobacco Last 30 Days: No.  Use: Never smoker.  .        Health Status   Allergies:    Allergic Reactions (Selected)  NKA   Current medications:  (Selected)   Inpatient Medications  Ordered  Clozaril oral 100 mg tablet: 200 mg = 2 tab, Oral, TID, 09/25/17 9:00:00, Routine, Tab  LORazepam injection (Ativan): 0.5 mg = 0.25 mL, Slow IV Push, Q10 Minutes, PRN seizures, 09/25/17 0:39:00, Routine, Injection  LORazepam oral 0.5 mg tablet: 0.5 mg = 1 tab, Oral, Q4H, PRN anxiety, 09/25/17 0:35:00, Routine, Tab  Tums (carbonate) oral 500 mg chewable tablet [elemental Ca 200 mg]: 500 mg = 1 tab, Chewed, Q4H, PRN indigestion, 09/25/17 0:35:00, Routine, Tab Chew  acetaminophen: 650 mg = 2 tab, Oral, Q4H, PRN pain, 09/25/17 0:34:00, Routine, Tab  aspirin oral 81 mg DR tablet (Ecotrin Low Strength): 81 mg = 1 tab, Oral, Daily, 09/25/17 9:00:00, Routine, Tab DR  atorvastatin oral 20 mg tablet: 20 mg = 1 tab, Oral, Q Evening, 09/25/17 1:00:00, Routine, Tab  cefTRIAXone  IV injection (Rocephin): 1,000 mg = 10 mL, Slow IV Push, Daily, Rationale: Empiric (suspected infection), Indication: UTI/Pyelonephritis, 09/25/17 21:00:00, Routine, x 7 days, Stop: 10/01/17 21:00:00, mL TOTAL Volume 10, RATE: 300 mL/hr, Infuse over 2 minutes, Injection  cloZAPine oral 25 mg tablet: 50 mg = 2 tab, Oral, TID, 09/25/17 9:00:00, Routine, Tab  influenza virus vaccine, inactivated IM injection: 0.5 mL, IM, On Call, 09/25/17 2:46:58, Routine, Injection  sertraline oral 100 mg tablet: 100 mg = 1 tab, Oral, Q Bedtime, 09/25/17 21:00:00, Routine, Tab  valproic acid oral 250 mg capsule: 250 mg = 1 cap, Oral, BID, 09/25/17 9:00:00, Routine, Cap  Documented Medications  Documented  Clozaril oral 100 mg tablet: 200 mg = 2 tab, Oral, TID, with 50mg tablet=total 250mg, Maintenance  LORazepam oral 0.5 mg tablet: 0.5 mg = 1 tab, Oral, Q4H, PRN as needed  for anxiety, Maintenance  Tums (carbonate) oral 500 mg chewable tablet [elemental Ca 200 mg]: 500 mg = 1 tab, Chewed, Q4H, PRN for indigestion, Tab Chew, Maintenance  acetaminophen oral 325 mg tablet (Tylenol): 650 mg = 2 tab, Oral, Q4H, PRN as needed for pain, Maintenance  aspirin oral 81 mg DR tablet (Ecotrin Low Strength): 81 mg = 1 tab, Oral, Daily, Tab DR, Maintenance  atorvastatin oral 20 mg tablet: 20 mg = 1 tab, Oral, Q Evening, Tab, Maintenance  cloZAPine 50 mg oral tablet: 50 mg = 1 tab, Oral, TID, with 200mg tablet=total 250mg, Tab, Maintenance  sertraline oral 100 mg tablet: 100 mg = 1 tab, Oral, Q Bedtime, Tab, Maintenance      Physical Examination   VS/Measurements        Vitals between:   24-SEP-2017 09:40:31   TO   25-SEP-2017 09:40:31                   LAST RESULT MINIMUM MAXIMUM  Temperature 36.4 36.4 36.7  Heart Rate 70 70 86  Respiratory Rate 16 13 20  NISBP           105 105 151  NIDBP           72 68 105  NIMBP           112 72 112  SpO2                    98 94 100     General:  No acute distress, lethargic..    HENT:  Normocephalic.    Neck:  Supple.    Respiratory:  Lungs are clear to auscultation.    Cardiovascular:  Normal rate.    Gastrointestinal:  Soft, Non-tender.    Genitourinary:  No costovertebral angle tenderness.    Lymphatics:  No lymphadenopathy neck, axilla, groin.    Musculoskeletal:  No swelling.       Review / Management   Laboratory results:       Labs between:  24-SEP-2017 09:40 to 25-SEP-2017 09:40    CBC:                 WBC  HgB  Hct  Plt  MCV  RDW   24-SEP-2017 7.1  13.1  39.4  266  96.8  14.5     DIFF:                 Seg  Neutroph//ABS  Lymph//ABS  Mono//ABS  EOS/ABS   24-SEP-2017 NOT APPLICABLE  53 // 3.8 38 // 2.7 9 // 0.7 0 // (L) 0.0     BMP:                 Na  Cl  BUN  Glu   24-SEP-2017 145  (H) 109  11  (H) 178                              K  CO2  Cr  Ca                              3.8  22  0.79  9.5     CMP:                 AST  ALT  AlkPhos  Bili  Albumin    24-SEP-2017 23  22  (H) 134  0.2  (L) 3.3     Other Chem:             Mg  Phos  Triglycerides  GGTP  DirectBili                           1.8             COAG:                 INR  PT  PTT  Ddimer  Fibrinogen    24-SEP-2017 1.0  11.2  25                      .       Impression and Plan   Dx and Plan:     Diagnosis     Dementia (DSV86-TV F03.90, Diagnosis, Hospitalized, Patient Stated).     Schizophrenia (WHX20-OC F20.9, Diagnosis, Hospitalized, Patient Stated).     Altered mental status (TWB29-VD R41.82, Discharge, Medical).     Complaint of Altered mental status (PNED [id number], Reason For Visit, Medical).     suizure disorder.     h/o CVA..     .         Course: Progressing as expected.    Orders     neuro eval, restart valparoic acid. monitor closely.     .             Electronically Signed On 09/25/2017 09:45  __________________________________________________   ALTHEA WALKER     Admission

## 2022-06-16 NOTE — H&P ADULT - ASSESSMENT
PMHx of IDDM, HLD, obesity, HFpEF with mild LV diastolic dysfunction, MGUS, chronic anemia with a history of duodenal ulcer as well as GAVE and duodenal AVM s/p APC (last on 10/11/19), 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  He is transferred to [facility] from [facility] where he was admitted on 6/15/22 for multiple falls over 24hr period.   As per pt he had 3 falls in 24hrs, he was on the floor for 3 hours after last fall and crawled to the phone to call for help. He is c/o head,neck,left side pain, and mild nausea on way to ED. On Ct he was found to have a 5 mm distal left ureteral calculus causing hydroureteronephrosis.       Plan:   -Admit to Transplant Surgery   -Cont Ceftriaxone  -Consult Urology   -Consult ID          Immuno:   -Everolimus 4/4  -MMF 250mg BID  -Pred 5mg QD PMHx of IDDM, HLD, obesity, HFpEF with mild LV diastolic dysfunction, MGUS, chronic anemia with a history of duodenal ulcer as well as GAVE and duodenal AVM s/p APC (last on 10/11/19), 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  He is transferred to [facility] from [facility] where he was admitted on 6/15/22 for multiple falls over 24hr period.   As per pt he had 3 falls in 24hrs, he was on the floor for 3 hours after last fall and crawled to the phone to call for help. He is c/o head,neck,left side pain, and mild nausea on way to ED. On Ct he was found to have a 5 mm distal left ureteral calculus causing hydroureteronephrosis.       Plan:   -Admit to Transplant Surgery   -Cont Ceftriaxone  -Consult ID  -Consult Urology for left ureteral calculus   -ISS  -NPO  -CBC,CMP,Mg/Phos,Coags,Evero level  -f/u Ucx, Bld Cx     Immuno:   -Everolimus 4/4  -MMF 250mg BID  -Pred 5mg QD

## 2022-06-16 NOTE — PHYSICAL THERAPY INITIAL EVALUATION ADULT - PRECAUTIONS/LIMITATIONS, REHAB EVAL
transferred to [facility] from [facility] where he was admitted on 6/15/22 for multiple falls over 24hr period.   As per pt he had 3 falls in 24hrs, he was on the floor for 3 hours after last fall and crawled to the phone to call for help. He is c/o head,neck,left side pain, and mild nausea on way to ED. CT Cspine- No acute fx/dislocation. CTH- no acute ICH, CT Chest- Degenerative changes. There is a nondisplaced probable acute fracture of the L seventh anterior rib. On Ct he was found to have a 5 mm distal left ureteral calculus causing hydroureteronephrosis. transferred to [facility] from [facility] where he was admitted on 6/15/22 for multiple falls over 24hr period.   As per pt he had 3 falls in 24hrs, he was on the floor for 3 hours after last fall and crawled to the phone to call for help. He is c/o head,neck,left side pain, and mild nausea on way to ED. CT Cspine- No acute fx/dislocation. CTH- no acute ICH, CT Chest- Degenerative changes. There is a nondisplaced probable acute fracture of the L seventh anterior rib. On Ct he was found to have a 5 mm distal left ureteral calculus causing hydroureteronephrosis. transferred to [facility] from [facility] where he was admitted on 6/15/22 for multiple falls over 24hr period.   As per pt he had 3 falls in 24hrs, he was on the floor for 3 hours after last fall and crawled to the phone to call for help. He is c/o head,neck,left side pain, and mild nausea on way to ED. CT Cspine- No acute fx/dislocation. CTH- no acute ICH, CT Chest- Degenerative changes. There is a nondisplaced probable acute fracture of the L seventh anterior rib. On exam- noted bruise L anterior knee. no c/o pain/tenderness.   pt. with h/o fall.  pt c/o pain L anterior rib cage 8/10 during activity. no  c/o pain b/l ue/ le. No swelling/redness/ localized tenderness noted b/l ue/le. except bruise L ant knee. No c/o pain elsewhere including b/l le /ue during functional mobility/on weight bearing. On Ct he was found to have a 5 mm distal left ureteral calculus causing hydroureteronephrosis. transferred to [facility] from [facility] where he was admitted on 6/15/22 for multiple falls over 24hr period.   As per pt he had 3 falls in 24hrs, he was on the floor for 3 hours after last fall and crawled to the phone to call for help. He is c/o head,neck,left side pain, and mild nausea on way to ED. CT Cspine- No acute fx/dislocation. CTH- no acute ICH, CT Chest- Degenerative changes. There is a nondisplaced probable acute fracture of the L seventh anterior rib. On exam- noted bruise L anterior knee. no c/o pain/tenderness.   pt. with h/o fall.  pt c/o pain L anterior rib cage 8/10 during activity. no  c/o pain b/l ue/ le. No swelling/ localized tenderness noted b/l ue/le. No c/o pain elsewhere including b/l le /ue during functional mobility/on weight bearing.

## 2022-06-16 NOTE — H&P ADULT - HISTORY OF PRESENT ILLNESS
Cause(s) of Liver Disease: Nonalcoholic Steatohepatitis (JEAN)   Transplant Organ(s): Liver   Donor Characteristics: no Public Health Service increased risk, no hepatitis c (DANA+), not hepatitis B core Ab positive   Prashant Segundo is a 66 yr old PMHx JEAN Cirrhosis s/p DDLT (7/2/2020, c/b post-op VRE bacteremia), h/o R. heel OM (12/2020),    PMHx of IDDM, HLD, obesity, HFpEF with mild LV diastolic dysfunction, MGUS, chronic anemia with a history of duodenal ulcer as well as GAVE and duodenal AVM s/p APC (last on 10/11/19), 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  He is transferred to [facility] from [facility] where he was admitted on 6/15/22 for multiple falls over 24hr period.   As per pt he had 3 falls in 24hrs, he was on the floor for 3 hours after last fall and crawled to the phone to call for help. He is c/o head,neck,left side pain, and mild nausea on way to ED. On Ct he was found to have a 5 mm distal left ureteral calculus causing hydroureteronephrosis He denies LOC, dizziness, vomiting, weakness, numbness, sob or chest pain    Of note he was admitted from 12/20/21 to 1/6/22 s/p fall at home, no LOC or injuries. Found to be +Covid w/ CT chest findings concerning for beginnings of multifocal PNA. Received remdesivir and mononclonal antibodies and on dexamethasone (12/29-1/7). 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 PCP prophylaxis changed from bactrim to atovaquone for LFTs. Endocrine consulted for steroid induced hyperglycemia. Patient was found to have DVT on L femoral vein and started on full treatment AC, now on Eliquis 5mg BID.    PMHx of IDDM, HLD, obesity, HFpEF with mild LV diastolic dysfunction, MGUS, chronic anemia with a history of duodenal ulcer as well as GAVE and duodenal AVM s/p APC (last on 10/11/19), 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  He is transferred to [facility] from [facility] where he was admitted on 6/15/22 for multiple falls over 24hr period.   As per pt he had 3 falls in 24hrs, he was on the floor for 3 hours after last fall and crawled to the phone to call for help. He is c/o head,neck,left side pain, and mild nausea on way to ED. On Ct he was found to have a 5 mm distal left ureteral calculus causing hydroureteronephrosis He denies LOC, dizziness, vomiting, weakness, numbness, sob or chest pain    Of note he was admitted from 12/20/21 to 1/6/22 s/p fall at home, no LOC or injuries. Found to be +Covid w/ CT chest findings concerning for beginnings of multifocal PNA. Received remdesivir and mononclonal antibodies and on dexamethasone (12/29-1/7). 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 PCP prophylaxis changed from bactrim to atovaquone for LFTs. Endocrine consulted for steroid induced hyperglycemia. Patient was found to have DVT on L femoral vein and started on full treatment AC, now on Eliquis 5mg BID.     Cause(s) of Liver Disease: Nonalcoholic Steatohepatitis (JEAN)   Transplant Organ(s): Liver   Donor Characteristics: no Public Health Service increased risk, no hepatitis c (DANA+), not hepatitis B core Ab positive   Prashant Segundo is a 66 yr old PMHx JEAN Cirrhosis s/p DDLT (7/2/2020, c/b post-op VRE bacteremia), h/o R. heel OM (12/2020),

## 2022-06-16 NOTE — PATIENT PROFILE ADULT - FALL HARM RISK - HARM RISK INTERVENTIONS
Assistance with ambulation/Assistance OOB with selected safe patient handling equipment/Communicate Risk of Fall with Harm to all staff/Discuss with provider need for PT consult/Monitor gait and stability/Provide patient with walking aids - walker, cane, crutches/Reinforce activity limits and safety measures with patient and family/Reorient to person, place and time as needed/Review medications for side effects contributing to fall risk/Sit up slowly, dangle for a short time, stand at bedside before walking/Tailored Fall Risk Interventions/Use of alarms - bed, chair and/or voice tab/Visual Cue: Yellow wristband and red socks/Bed in lowest position, wheels locked, appropriate side rails in place/Call bell, personal items and telephone in reach/Instruct patient to call for assistance before getting out of bed or chair/Non-slip footwear when patient is out of bed/[place] to call system/Physically safe environment - no spills, clutter or unnecessary equipment/Purposeful Proactive Rounding/Room/bathroom lighting operational, light cord in reach

## 2022-06-16 NOTE — CONSULT NOTE ADULT - ATTENDING COMMENTS
66-year-old male with a known history of cirrhosis secondary to nonalcoholic steatohepatitis, status post orthotopic liver transplantation July 2, 2020 with complicated posttransplant course as noted in fellow's note.  Patient currently admitted status post fall and a ureteral stone initially at [facility] on Kristi 15, 2022 and was transferred to [facility] for further management.  I agree with the outlined history, physical examination, assessment and plan.  Would recommend urology evaluation for management of the ureteral stone.  Continue with Inderal was 4 mg twice a day, CellCept to 50 mg twice a day and prednisone 5 mg daily.    Keep patient on Valcyte 450 mg daily. 66-year-old male with a known history of cirrhosis secondary to nonalcoholic steatohepatitis, status post orthotopic liver transplantation July 2, 2020 with complicated posttransplant course as noted in fellow's note.  Patient currently admitted status post fall and a ureteral stone initially at [facility] on Kristi 15, 2022 and was transferred to [facility] for further management.  I agree with the outlined history, physical examination, assessment and plan.  Would recommend urology evaluation for management of the ureteral stone.  Continue with Everolimus was 4 mg twice a day, CellCept to 50 mg twice a day and prednisone 5 mg daily.    Keep patient on Valcyte 450 mg daily. 66-year-old male with a known history of cirrhosis secondary to nonalcoholic steatohepatitis, status post orthotopic liver transplantation July 2, 2020 with complicated posttransplant course as noted in fellow's note.  Patient currently admitted status post fall and a ureteral stone initially at [facility] on Kristi 15, 2022 and was transferred to [facility] for further management.  I agree with the outlined history, physical examination, assessment and plan.  Would recommend urology evaluation for management of the ureteral stone.  Continue with Everolimus was 4 mg twice a day, CellCept to 250 mg twice a day and prednisone 5 mg daily.    Keep patient on Valcyte 450 mg daily.

## 2022-06-16 NOTE — ED ADULT NURSE REASSESSMENT NOTE - NS ED NURSE REASSESS COMMENT FT1
pt resting comfortably, no acute distress. As per transfer center, No bed available at [place], transfer will be initiated when bed is assigned

## 2022-06-16 NOTE — CONSULT NOTE ADULT - SUBJECTIVE AND OBJECTIVE BOX
HPI:  Patient is a 66y Male who presented after fall when getting out of shower, on evaluation, CT scans incidentally showed a 5mm left distal ureteral calculus.    He is transferred to [facility] from [facility] where he was admitted on 6/15/22 for multiple falls over 24hr period.   As per pt he had 3 falls in 24hrs, he was on the floor for 3 hours after last fall and crawled to the phone to call for help. He is c/o head,neck,left side pain, and mild nausea on way to ED. He denied LOC, dizziness, vomiting, weakness, numbness, sob or chest pain.      Besides the stone, his CT also showed some rib fractures.     He states he last passed a large stone 30 years ago. Per the chart, he has seen Dr. Mayfield in the past (last in ) for treatment of prostate abscess as well as BPH.  Patient denies any urinary frequency, hematuria, dysuria, or urgency, hesitancy.  He does take tamsulosin.       PAST MEDICAL & SURGICAL HISTORY:  Diabetes      Hypercholesteremia      Neuropathy      Hypertension      Renal stones  25 years ago      Fatty liver disease, nonalcoholic      Obesity      Hepatic encephalopathy      GERD with esophagitis  Gastritis &amp; Non Bleeding Ulcers      GIB (gastrointestinal bleeding)      S/P cholecystectomy        MEDICATIONS  (STANDING):  atorvastatin 80 milliGRAM(s) Oral at bedtime  cefTRIAXone   IVPB 1000 milliGRAM(s) IV Intermittent every 24 hours  dextrose 5%. 1000 milliLiter(s) (100 mL/Hr) IV Continuous <Continuous>  dextrose 5%. 1000 milliLiter(s) (50 mL/Hr) IV Continuous <Continuous>  dextrose 50% Injectable 25 Gram(s) IV Push once  dextrose 50% Injectable 12.5 Gram(s) IV Push once  dextrose 50% Injectable 25 Gram(s) IV Push once  everolimus (ZORTRESS) 4 milliGRAM(s) Oral <User Schedule>  folic acid 1 milliGRAM(s) Oral daily  glucagon  Injectable 1 milliGRAM(s) IntraMuscular once  insulin lispro (ADMELOG) corrective regimen sliding scale   SubCutaneous three times a day before meals  insulin lispro (ADMELOG) corrective regimen sliding scale   SubCutaneous at bedtime  lidocaine   4% Patch 1 Patch Transdermal daily  mycophenolate mofetil 250 milliGRAM(s) Oral two times a day  omega-3-Acid Ethyl Esters 1 Gram(s) Oral two times a day  pantoprazole    Tablet 40 milliGRAM(s) Oral before breakfast  predniSONE   Tablet 5 milliGRAM(s) Oral daily  sertraline 100 milliGRAM(s) Oral daily  sodium chloride 0.9%. 1000 milliLiter(s) (75 mL/Hr) IV Continuous <Continuous>  tamsulosin 0.4 milliGRAM(s) Oral at bedtime  valGANciclovir 450 milliGRAM(s) Oral daily    MEDICATIONS  (PRN):  dextrose Oral Gel 15 Gram(s) Oral once PRN Blood Glucose LESS THAN 70 milliGRAM(s)/deciliter    FAMILY HISTORY:  Family history of stomach cancer    Family history of hypertension    Family history of type 2 diabetes mellitus      Allergies    codeine (Anaphylaxis)    Intolerances      SOCIAL HISTORY:   Tobacco hx:    REVIEW OF SYSTEMS: Pertinent positives and negatives as stated in HPI, otherwise negative    Vital signs  T(C): 37.1, Max: 37.1 ( @ 02:04)  HR: 84  BP: 167/85  SpO2: 96%      Physical Exam  Gen: NAD  Abd: Soft, NT, ND  Back: No CVAT   : voided urine- tea colored     LABS:           @ 09:03    WBC 4.03  / Hct 28.8  / SCr 2.02     06-15 @ 16:37    WBC 5.37  / Hct 30.5  / SCr 2.20         139  |  108  |  47<H>  ----------------------------<  253<H>  4.4   |  21<L>  |  2.02<H>    Ca    9.1      2022 09:03  Phos  3.0       Mg     2.1         TPro  6.9  /  Alb  3.9  /  TBili  0.4  /  DBili  x   /  AST  29  /  ALT  96<H>  /  AlkPhos  113  -    PT/INR - ( 2022 09:03 )   PT: 15.6 sec;   INR: 1.35 ratio         PTT - ( 2022 09:03 )  PTT:29.7 sec  Urinalysis Basic - ( 15 Rachid 2022 18:55 )    Color: Yellow / Appearance: Slightly Turbid / S.020 / pH: x  Gluc: x / Ketone: Negative  / Bili: Negative / Urobili: Negative   Blood: x / Protein: 100 / Nitrite: Negative   Leuk Esterase: Moderate / RBC: 11-25 /HPF / WBC 26-50   Sq Epi: x / Non Sq Epi: Occasional / Bacteria: Few        Urine Cx:     Blood Cx:    RADIOLOGY:  < from: CT Abdomen and Pelvis No Cont (06.15.22 @ 17:39) >    IMPRESSION:  Acute nondisplaced left seventh anterior rib fracture  Small area of linear atelectasis versus infiltrate in the posterior left   upper lobe  5 mm distal left ureteral calculus causing hydroureteronephrosis  Mild bladder wall thickening appearing more prominent on the left.    < end of copied text >

## 2022-06-16 NOTE — H&P ADULT - NS ATTEND AMEND GEN_ALL_CORE FT
Agree with above.  Admitted with provoked fall and left ureteral stone with left hydroureter.  has left flank pain on exam.  Will continue abx, urology consult.  incentive spirometry and pain control for rib fx. Liver function is good.

## 2022-06-16 NOTE — PHYSICAL THERAPY INITIAL EVALUATION ADULT - PERTINENT HX OF CURRENT PROBLEM, REHAB EVAL
PMHx of IDDM, HLD, obesity, HFpEF with mild LV diastolic dysfunction, MGUS, chronic anemia with a history of duodenal ulcer as well as GAVE and duodenal AVM s/p APC (last on 10/11/19), 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

## 2022-06-16 NOTE — CONSULT NOTE ADULT - SUBJECTIVE AND OBJECTIVE BOX
Chief Complaint:  Patient is a 66y old  Male who presents with a chief complaint of ureteral calculus, hydroureteronephrosis (2022 06:56)      HPI: 66M w/ PMHx JEAN cirrhosis s/p OLT 20 (course c/b VRE bacteremia, CNI toxicity to both tacro and cyclosporine; switched to everolimus 20), HLD, IDDM, obesity, HFpEF presenting w/ fall and ureteral stone to [facility] on 6/15/22, now transferred to [facility] for further management.     Pt reported 3 falls within 24hr, reports he was on the floor x 3hr before being able to call for help. Found to have subsequent head/neck/left sided pain. CT a/p at [place] showed 5mm distal left ureteral calculus w/ hydronephrosis.     Denies abd pain, n/v/d/f/c, LOC, dizziness, HA, cp, sob.     Allergies:  codeine (Anaphylaxis)      Home Medications:    Hospital Medications:  atorvastatin 80 milliGRAM(s) Oral at bedtime  cefTRIAXone   IVPB 1000 milliGRAM(s) IV Intermittent every 24 hours  dextrose 5%. 1000 milliLiter(s) IV Continuous <Continuous>  dextrose 5%. 1000 milliLiter(s) IV Continuous <Continuous>  dextrose 50% Injectable 25 Gram(s) IV Push once  dextrose 50% Injectable 12.5 Gram(s) IV Push once  dextrose 50% Injectable 25 Gram(s) IV Push once  dextrose Oral Gel 15 Gram(s) Oral once PRN  everolimus (ZORTRESS) 4 milliGRAM(s) Oral <User Schedule>  folic acid 1 milliGRAM(s) Oral daily  glucagon  Injectable 1 milliGRAM(s) IntraMuscular once  insulin lispro (ADMELOG) corrective regimen sliding scale   SubCutaneous three times a day before meals  insulin lispro (ADMELOG) corrective regimen sliding scale   SubCutaneous at bedtime  lidocaine   4% Patch 1 Patch Transdermal daily  mycophenolate mofetil 250 milliGRAM(s) Oral two times a day  omega-3-Acid Ethyl Esters 1 Gram(s) Oral two times a day  pantoprazole    Tablet 40 milliGRAM(s) Oral before breakfast  predniSONE   Tablet 5 milliGRAM(s) Oral daily  sertraline 100 milliGRAM(s) Oral daily  sodium chloride 0.9%. 1000 milliLiter(s) IV Continuous <Continuous>  tamsulosin 0.4 milliGRAM(s) Oral at bedtime  valGANciclovir 450 milliGRAM(s) Oral daily      PMHX/PSHX:  Diabetes    Hypercholesteremia    Neuropathy    Hypertension    Renal stones    Fatty liver disease, nonalcoholic    Obesity    Hepatic encephalopathy    GERD with esophagitis    GIB (gastrointestinal bleeding)    No significant past surgical history    S/P cholecystectomy        Family history:  No pertinent family history in first degree relatives    Family history of stomach cancer    Family history of hypertension    Family history of type 2 diabetes mellitus        Denies family history of colon cancer/polyps, stomach cancer/polyps, pancreatic cancer/masses, liver cancer/disease, ovarian cancer and endometrial cancer.    Social History:     Tob: Denies  EtOH: Denies  Illicit Drugs: Denies    ROS:     General:  No wt loss, fevers, chills, night sweats, fatigue  Eyes:  Good vision, no reported pain  ENT:  No sore throat, pain, runny nose, dysphagia  CV:  No pain, palpitations, hypo/hypertension  Pulm:  No dyspnea, cough, tachypnea, wheezing  GI: see above  :  No pain, bleeding, incontinence, nocturia  Muscle:  No pain, weakness  Neuro:  No weakness, tingling, memory problems  Psych:  No fatigue, insomnia, mood problems, depression  Endocrine:  No polyuria, polydipsia, cold/heat intolerance  Heme:  No petechiae, ecchymosis, easy bruisability  Skin:  No rash, tattoos, scars, edema    PHYSICAL EXAM:     GENERAL:  No acute distress  HEENT:  Normocephalic/atraumatic, no scleral icterus  CHEST:  Clear to auscultation bilaterally, no wheezes/rales/ronchi, no accessory muscle use  HEART:  Regular rate and rhythm, no murmurs/rubs/gallops  ABDOMEN:  Soft, non-tender, non-distended, normoactive bowel sounds, +chevron incision   EXTREMITIES: No cyanosis, clubbing, or edema  SKIN:  No rash/warm/dry  NEURO:  Alert and oriented x 3, no asterixis    Vital Signs:  Vital Signs Last 24 Hrs  T(C): 37.1 (2022 06:19), Max: 37.1 (2022 02:04)  T(F): 98.8 (2022 06:19), Max: 98.8 (2022 02:04)  HR: 84 (2022 06:19) (80 - 90)  BP: 167/85 (2022 06:19) (114/60 - 167/85)  BP(mean): --  RR: 18 (2022 06:19) (18 - 18)  SpO2: 96% (2022 06:19) (91% - 96%)  Daily     Daily     LABS:                        9.6    4.03  )-----------( 142      ( 2022 09:03 )             28.8     Mean Cell Volume: 90.0 fl (22 @ 09:03)    -    139  |  108  |  47<H>  ----------------------------<  253<H>  4.4   |  21<L>  |  2.02<H>    Ca    9.1      2022 09:03  Phos  3.0     -  Mg     2.1     -16    TPro  6.9  /  Alb  3.9  /  TBili  0.4  /  DBili  x   /  AST  29  /  ALT  96<H>  /  AlkPhos  113  06-16    LIVER FUNCTIONS - ( 2022 09:03 )  Alb: 3.9 g/dL / Pro: 6.9 g/dL / ALK PHOS: 113 U/L / ALT: 96 U/L / AST: 29 U/L / GGT: x           PT/INR - ( 2022 09:03 )   PT: 15.6 sec;   INR: 1.35 ratio         PTT - ( 2022 09:03 )  PTT:29.7 sec  Urinalysis Basic - ( 15 Rachid 2022 18:55 )    Color: Yellow / Appearance: Slightly Turbid / S.020 / pH: x  Gluc: x / Ketone: Negative  / Bili: Negative / Urobili: Negative   Blood: x / Protein: 100 / Nitrite: Negative   Leuk Esterase: Moderate / RBC: 11-25 /HPF / WBC 26-50   Sq Epi: x / Non Sq Epi: Occasional / Bacteria: Few      Amylase Serum--      Lipase serum62       Ammonia--                          9.6    4.03  )-----------( 142      ( 2022 09:03 )             28.8                         10.4   5.37  )-----------( 156      ( 15 Rachid 2022 16:37 )             30.5       Imaging:

## 2022-06-16 NOTE — PHYSICAL THERAPY INITIAL EVALUATION ADULT - RANGE OF MOTION EXAMINATION, REHAB EVAL
August 18, 2020       Osvaldo Garrido MD  [address] [facility] [address]  Via Fax: [phone]      Patient: Thien Mccain   YOB: 1964   Date of Visit: 8/18/2020       Dear Dr. Garrido:    Thank you for referring Thien Mccain to me for evaluation. Below are my notes for this visit with him.    If you have questions, please do not hesitate to call me. I look forward to following your patient along with you.      Sincerely,        Shaila Mendez MD        CC: No Recipients  Shaila Mendez MD  8/18/2020 10:05 AM  Sign when Signing Visit  HPI:   56 year old male coming in with retroperitoneal thickening versus a mass.  He had a CT scan several months ago because of some nonspecific back pain.  This was found to be from a nerve origin, and he underwent an injection for this.  That will care for the pain.  He no longer has this pain at all.  During that CT scan, he was found to have retroperitoneal thickening versus a mass.  He is seeing me for management of this.  He currently is eating well and having bowel movements.  He has no fevers or chills.  He has no night sweats.  He has no weight gain or weight loss.  He has no loss of appetite.  He occasionally gets nausea, but that has not changed.  He is ambulatory and functional.  He otherwise feels well.        Review of Systems:  Review of Systems   Constitutional: Negative for activity change, appetite change, chills, diaphoresis, fatigue, fever and unexpected weight change.   HENT: Negative for congestion, ear discharge, ear pain, hearing loss, sinus pain, sore throat, trouble swallowing and voice change.    Eyes: Negative for pain, discharge and redness.   Respiratory: Negative for cough, chest tightness, shortness of breath, wheezing and stridor.    Cardiovascular: Negative for chest pain, palpitations and leg swelling.   Gastrointestinal: Negative for abdominal distention, abdominal pain, blood in stool, constipation, diarrhea, nausea and  vomiting.   Endocrine: Negative for cold intolerance, heat intolerance and polyuria.   Genitourinary: Negative for difficulty urinating, flank pain, frequency and urgency.   Musculoskeletal: Negative for arthralgias, back pain, joint swelling and myalgias.   Skin: Negative for color change, pallor, rash and wound.   Allergic/Immunologic: Negative for environmental allergies and food allergies.   Neurological: Negative for dizziness, seizures, facial asymmetry, speech difficulty, weakness, light-headedness and headaches.   Hematological: Negative for adenopathy. Does not bruise/bleed easily.   Psychiatric/Behavioral: Negative for agitation, confusion and dysphoric mood.       Past Medical History: Reviewed  Morbid obesity  DM2    Past Surgical History: Reviewed  Rotator cuff surgery, right    Current Medication: Reviewed and updated  Current Outpatient Medications   Medication Sig Dispense Refill   • metformin (GLUCOPHAGE) 1000 MG tablet Take 1,000 mg by mouth.     • losartan (COZAAR) 50 MG tablet Take 50 mg by mouth daily.     • ibuprofen (MOTRIN) 800 MG tablet Take 800 mg by mouth.     • glipiZIDE (GLUCOTROL) 5 MG tablet Take 5 mg by mouth.       No current facility-administered medications for this visit.        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 No pertinent family history.  No cancer  No chronic diseases      Physical Exam:  Physical Exam   Constitutional: oriented to person, place, and time. appears well-developed and well-nourished.   HENT:   Head: Normocephalic and atraumatic.   Nose: Nose normal.   Eyes: Conjunctivae and EOM are normal. Right eye exhibits no discharge. Left eye exhibits no discharge. No scleral icterus.   Neck: Normal range of motion. No JVD present. No tracheal deviation present. No thyromegaly present.   Cardiovascular:  Normal rate, regular rhythm and intact distal pulses.   Pulmonary/Chest: Effort normal and breath sounds normal. No respiratory distress.  no wheezes.   Abdominal: Soft.  Obese, no distension and no mass. There is no tenderness. There is no rebound and no guarding. No hernia.   Musculoskeletal: Normal range of motion.  no edema, tenderness or deformity.   Lymphadenopathy:  no cervical adenopathy.   Neurological: alert and oriented to person, place, and time.   Skin: Skin is warm and dry. No rash noted.   Psychiatric: normal mood and affect. behavior is normal. Thought content normal.       Imaging Work Up: Reviewed and demonstrated    [id number] (7/18/2020)     EXAM: CT ABDOMEN AND PELVIS W CON     CLINICAL INDICATION: Abnormal lumbar spine x-ray, patient reports leg pain     COMPARISON: None available     TECHNIQUE: Helical CT was performed of the abdomen and pelvis, with axial, coronal, and   sagittal reconstructions performed and provided for review. A total of 80 ml Omnipaque 350 was   administered intravenously during the study. Automated exposure control was utilized.     FINDINGS:     LOWER CHEST: The heart is normal in size. The lung bases are grossly clear.     LIVER: Normal     BILIARY TREE AND GALLBLADDER: Multiple stones in the gallbladder.  No biliary dilation.     PANCREAS: Normal     SPLEEN: Normal     ADRENAL GLANDS: Normal     KIDNEYS AND URETERS: 7 mm stone in the lower pole right kidney.  No suspicious lesions or   obstructive uropathy.     BLADDER: Normal     REPRODUCTIVE ORGANS: Grossly normal prostate     BOWEL: No bowel obstruction or inflammation.  Normal appendix.     PERITONEUM AND RETROPERITONEUM: No free or focal fluid or air.  Small area of soft tissue   thickening in the superior left periaortic retroperitoneum measuring 2.7 x 1.6 x 5.4 cm (series   3 image 56, coronal image 58, sagittal image 81) abutting the aorta and celiac and SMA origins.   No vascular displacement.     VESSELS:  Unremarkable     LYMPH NODES: Aside from above, no lymphadenopathy     BODY WALL: Small fat-containing umbilical hernia.     BONES: Mild degenerative changes in the lumbar spine with discogenic change worst at L2-L3 and   additional degeneration in the lower lumbar facets.  Additional mild degeneration in the imaged   lower thoracic spine.  No suspicious findings.     IMPRESSION:  1.  Soft tissue thickening in the superior left periaortic retroperitoneum measuring up to   5.4 cm.  While nonspecific, this could represent confluent periaortic lymph nodes or early   retroperitoneal fibrosis, with retroperitoneal lymphoma not entirely excluded.  At a minimum,   would suggest follow-up with CT/MRI in 3-6 months.  If indicated, FDG PET/CT or biopsy could be   considered.  2.  Nonobstructing right renal calculus measuring 7 mm.  3.  Cholecystolithiasis.       Assessment:   This is a 56 year old male patient with a retroperitoneal mass that is suspicious for lymphadenopathy.  I explained that this may be a benign diagnosis such as reactive lymphadenopathy or even just retroperitoneal fibrosis.  It may also represent lymphoma.  Based on its location, is not amenable to surgical biopsy.  I would recommend a PET scan to evaluate the avidity of the mass and its activity.  If this is PET avid, I would recommend an IR biopsy.  If it is not PET avid, then get an interval scan at 3 months from the original, which would be in October.  They are okay with this plan.  All questions were answered.    Thank you for involving me in his care.    -Shaila Mendez MD  General Surgery and Surgical Oncology  [facility], [place], operated by [facility], [place]            bilateral upper extremity ROM was WFL (within functional limits)/bilateral lower extremity ROM was WFL (within functional limits)

## 2022-06-17 LAB
ALBUMIN SERPL ELPH-MCNC: 3.7 G/DL — SIGNIFICANT CHANGE UP (ref 3.3–5)
ALP SERPL-CCNC: 115 U/L — SIGNIFICANT CHANGE UP (ref 40–120)
ALT FLD-CCNC: 84 U/L — HIGH (ref 10–45)
ANION GAP SERPL CALC-SCNC: 13 MMOL/L — SIGNIFICANT CHANGE UP (ref 5–17)
AST SERPL-CCNC: 28 U/L — SIGNIFICANT CHANGE UP (ref 10–40)
BASOPHILS # BLD AUTO: 0.01 K/UL — SIGNIFICANT CHANGE UP (ref 0–0.2)
BASOPHILS NFR BLD AUTO: 0.3 % — SIGNIFICANT CHANGE UP (ref 0–2)
BILIRUB SERPL-MCNC: 0.3 MG/DL — SIGNIFICANT CHANGE UP (ref 0.2–1.2)
BUN SERPL-MCNC: 40 MG/DL — HIGH (ref 7–23)
CALCIUM SERPL-MCNC: 9.2 MG/DL — SIGNIFICANT CHANGE UP (ref 8.4–10.5)
CHLORIDE SERPL-SCNC: 109 MMOL/L — HIGH (ref 96–108)
CMV DNA CSF QL NAA+PROBE: SIGNIFICANT CHANGE UP
CMV DNA SPEC NAA+PROBE-LOG#: SIGNIFICANT CHANGE UP LOG10IU/ML
CO2 SERPL-SCNC: 18 MMOL/L — LOW (ref 22–31)
CREAT SERPL-MCNC: 1.98 MG/DL — HIGH (ref 0.5–1.3)
CULTURE RESULTS: SIGNIFICANT CHANGE UP
EGFR: 37 ML/MIN/1.73M2 — LOW
EOSINOPHIL # BLD AUTO: 0.06 K/UL — SIGNIFICANT CHANGE UP (ref 0–0.5)
EOSINOPHIL NFR BLD AUTO: 1.6 % — SIGNIFICANT CHANGE UP (ref 0–6)
GLUCOSE BLDC GLUCOMTR-MCNC: 195 MG/DL — HIGH (ref 70–99)
GLUCOSE BLDC GLUCOMTR-MCNC: 202 MG/DL — HIGH (ref 70–99)
GLUCOSE BLDC GLUCOMTR-MCNC: 203 MG/DL — HIGH (ref 70–99)
GLUCOSE BLDC GLUCOMTR-MCNC: 228 MG/DL — HIGH (ref 70–99)
GLUCOSE BLDC GLUCOMTR-MCNC: 229 MG/DL — HIGH (ref 70–99)
GLUCOSE BLDC GLUCOMTR-MCNC: 264 MG/DL — HIGH (ref 70–99)
GLUCOSE SERPL-MCNC: 221 MG/DL — HIGH (ref 70–99)
HCT VFR BLD CALC: 28.6 % — LOW (ref 39–50)
HGB BLD-MCNC: 9.6 G/DL — LOW (ref 13–17)
IMM GRANULOCYTES NFR BLD AUTO: 1.1 % — SIGNIFICANT CHANGE UP (ref 0–1.5)
LYMPHOCYTES # BLD AUTO: 0.85 K/UL — LOW (ref 1–3.3)
LYMPHOCYTES # BLD AUTO: 22.9 % — SIGNIFICANT CHANGE UP (ref 13–44)
MAGNESIUM SERPL-MCNC: 2 MG/DL — SIGNIFICANT CHANGE UP (ref 1.6–2.6)
MCHC RBC-ENTMCNC: 30 PG — SIGNIFICANT CHANGE UP (ref 27–34)
MCHC RBC-ENTMCNC: 33.6 GM/DL — SIGNIFICANT CHANGE UP (ref 32–36)
MCV RBC AUTO: 89.4 FL — SIGNIFICANT CHANGE UP (ref 80–100)
MONOCYTES # BLD AUTO: 0.29 K/UL — SIGNIFICANT CHANGE UP (ref 0–0.9)
MONOCYTES NFR BLD AUTO: 7.8 % — SIGNIFICANT CHANGE UP (ref 2–14)
NEUTROPHILS # BLD AUTO: 2.46 K/UL — SIGNIFICANT CHANGE UP (ref 1.8–7.4)
NEUTROPHILS NFR BLD AUTO: 66.3 % — SIGNIFICANT CHANGE UP (ref 43–77)
NRBC # BLD: 0 /100 WBCS — SIGNIFICANT CHANGE UP (ref 0–0)
PHOSPHATE SERPL-MCNC: 2.5 MG/DL — SIGNIFICANT CHANGE UP (ref 2.5–4.5)
PLATELET # BLD AUTO: 140 K/UL — LOW (ref 150–400)
POTASSIUM SERPL-MCNC: 4.2 MMOL/L — SIGNIFICANT CHANGE UP (ref 3.5–5.3)
POTASSIUM SERPL-SCNC: 4.2 MMOL/L — SIGNIFICANT CHANGE UP (ref 3.5–5.3)
PROT SERPL-MCNC: 6.9 G/DL — SIGNIFICANT CHANGE UP (ref 6–8.3)
RBC # BLD: 3.2 M/UL — LOW (ref 4.2–5.8)
RBC # FLD: 13.3 % — SIGNIFICANT CHANGE UP (ref 10.3–14.5)
SODIUM SERPL-SCNC: 140 MMOL/L — SIGNIFICANT CHANGE UP (ref 135–145)
SPECIMEN SOURCE: SIGNIFICANT CHANGE UP
WBC # BLD: 3.71 K/UL — LOW (ref 3.8–10.5)
WBC # FLD AUTO: 3.71 K/UL — LOW (ref 3.8–10.5)

## 2022-06-17 PROCEDURE — 76770 US EXAM ABDO BACK WALL COMP: CPT | Mod: 26

## 2022-06-17 PROCEDURE — 99223 1ST HOSP IP/OBS HIGH 75: CPT

## 2022-06-17 PROCEDURE — 99232 SBSQ HOSP IP/OBS MODERATE 35: CPT

## 2022-06-17 PROCEDURE — 72141 MRI NECK SPINE W/O DYE: CPT | Mod: 26

## 2022-06-17 RX ORDER — ACETAMINOPHEN 500 MG
1000 TABLET ORAL ONCE
Refills: 0 | Status: COMPLETED | OUTPATIENT
Start: 2022-06-17 | End: 2022-06-17

## 2022-06-17 RX ORDER — LIDOCAINE 4 G/100G
1 CREAM TOPICAL DAILY
Refills: 0 | Status: DISCONTINUED | OUTPATIENT
Start: 2022-06-17 | End: 2022-06-23

## 2022-06-17 RX ORDER — EVEROLIMUS 10 MG/1
4 TABLET ORAL
Refills: 0 | Status: DISCONTINUED | OUTPATIENT
Start: 2022-06-17 | End: 2022-06-28

## 2022-06-17 RX ORDER — ALPRAZOLAM 0.25 MG
0.5 TABLET ORAL ONCE
Refills: 0 | Status: DISCONTINUED | OUTPATIENT
Start: 2022-06-17 | End: 2022-06-17

## 2022-06-17 RX ORDER — LIDOCAINE 4 G/100G
1 CREAM TOPICAL DAILY
Refills: 0 | Status: DISCONTINUED | OUTPATIENT
Start: 2022-06-17 | End: 2022-06-29

## 2022-06-17 RX ADMIN — INSULIN GLARGINE 8 UNIT(S): 100 INJECTION, SOLUTION SUBCUTANEOUS at 22:19

## 2022-06-17 RX ADMIN — Medication 1000 MILLIGRAM(S): at 12:47

## 2022-06-17 RX ADMIN — MYCOPHENOLATE MOFETIL 250 MILLIGRAM(S): 250 CAPSULE ORAL at 17:01

## 2022-06-17 RX ADMIN — VALGANCICLOVIR 450 MILLIGRAM(S): 450 TABLET, FILM COATED ORAL at 12:33

## 2022-06-17 RX ADMIN — MYCOPHENOLATE MOFETIL 250 MILLIGRAM(S): 250 CAPSULE ORAL at 05:58

## 2022-06-17 RX ADMIN — Medication 1 GRAM(S): at 05:58

## 2022-06-17 RX ADMIN — Medication 6 UNIT(S): at 13:35

## 2022-06-17 RX ADMIN — LIDOCAINE 1 PATCH: 4 CREAM TOPICAL at 12:33

## 2022-06-17 RX ADMIN — EVEROLIMUS 4 MILLIGRAM(S): 10 TABLET ORAL at 08:59

## 2022-06-17 RX ADMIN — Medication 4: at 13:36

## 2022-06-17 RX ADMIN — LIDOCAINE 1 PATCH: 4 CREAM TOPICAL at 22:20

## 2022-06-17 RX ADMIN — ATORVASTATIN CALCIUM 80 MILLIGRAM(S): 80 TABLET, FILM COATED ORAL at 22:20

## 2022-06-17 RX ADMIN — HEPARIN SODIUM 5000 UNIT(S): 5000 INJECTION INTRAVENOUS; SUBCUTANEOUS at 06:02

## 2022-06-17 RX ADMIN — EVEROLIMUS 4 MILLIGRAM(S): 10 TABLET ORAL at 20:33

## 2022-06-17 RX ADMIN — Medication 2: at 18:32

## 2022-06-17 RX ADMIN — Medication 400 MILLIGRAM(S): at 12:32

## 2022-06-17 RX ADMIN — Medication 1 MILLIGRAM(S): at 12:34

## 2022-06-17 RX ADMIN — Medication 0.5 MILLIGRAM(S): at 16:47

## 2022-06-17 RX ADMIN — LIDOCAINE 1 PATCH: 4 CREAM TOPICAL at 06:08

## 2022-06-17 RX ADMIN — Medication 2: at 02:45

## 2022-06-17 RX ADMIN — Medication 4: at 09:04

## 2022-06-17 RX ADMIN — Medication 1 GRAM(S): at 17:01

## 2022-06-17 RX ADMIN — Medication 6 UNIT(S): at 09:04

## 2022-06-17 RX ADMIN — LIDOCAINE 1 PATCH: 4 CREAM TOPICAL at 22:19

## 2022-06-17 RX ADMIN — CEFTRIAXONE 100 MILLIGRAM(S): 500 INJECTION, POWDER, FOR SOLUTION INTRAMUSCULAR; INTRAVENOUS at 08:59

## 2022-06-17 RX ADMIN — PANTOPRAZOLE SODIUM 40 MILLIGRAM(S): 20 TABLET, DELAYED RELEASE ORAL at 05:58

## 2022-06-17 RX ADMIN — Medication 6 UNIT(S): at 18:32

## 2022-06-17 RX ADMIN — TAMSULOSIN HYDROCHLORIDE 0.4 MILLIGRAM(S): 0.4 CAPSULE ORAL at 22:21

## 2022-06-17 RX ADMIN — HEPARIN SODIUM 5000 UNIT(S): 5000 INJECTION INTRAVENOUS; SUBCUTANEOUS at 17:01

## 2022-06-17 RX ADMIN — Medication 5 MILLIGRAM(S): at 05:58

## 2022-06-17 RX ADMIN — SERTRALINE 100 MILLIGRAM(S): 25 TABLET, FILM COATED ORAL at 12:33

## 2022-06-17 NOTE — CONSULT NOTE ADULT - SUBJECTIVE AND OBJECTIVE BOX
Patient is a 66y old  Male who presents with a chief complaint of ureteral calculus, hydroureteronephrosis (2022 14:43)    HPI:    66 year old male PMH DDLT (2020, c/b post-op VRE bacteremia), prior ESBL E. coli prostate abscess, R heel OM (2020), C. Diff (2020), DM, MGUS, and HFpEF, decompensated JEAN cirrhosis status post liver transplant  for JEAN who presents with falls and CT imaging suggesting 5 mm distal left ureteral calculus causing hydroureteronephrosis.     Of note he was admitted from 21 to 22 s/p fall at home, no LOC or injuries. Found to be +Covid w/ CT chest findings concerning for beginnings of multifocal PNA. Received remdesivir and mononclonal antibodies and on dexamethasone (-). 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 PCP prophylaxis changed from bactrim to atovaquone for LFTs. Endocrine consulted for steroid induced hyperglycemia. Patient was found to have DVT on L femoral vein and started on full treatment AC, now on Eliquis 5mg BID.     Cause(s) of Liver Disease: Nonalcoholic Steatohepatitis (JEAN)   Transplant Organ(s): Liver   Donor Characteristics: no Public Health Service increased risk, no hepatitis c (DANA+), not hepatitis B core Ab positive   Prashant Segundo is a 66 yr old PMHx JEAN Cirrhosis s/p DDLT (2020, c/b post-op VRE bacteremia), h/o R. heel OM (2020), (2022 06:56)     prior hospital charts reviewed [  ]  primary team notes reviewed [ x ]  other consultant notes reviewed [ x ]    PAST MEDICAL & SURGICAL HISTORY:  Diabetes      Hypercholesteremia      Neuropathy      Hypertension      Renal stones  25 years ago      Fatty liver disease, nonalcoholic      Obesity      Hepatic encephalopathy      GERD with esophagitis  Gastritis &amp; Non Bleeding Ulcers      GIB (gastrointestinal bleeding)      S/P cholecystectomy          Allergies  codeine (Anaphylaxis)    ANTIMICROBIALS (past 90 days)  MEDICATIONS  (STANDING):  cefTRIAXone   IVPB   100 mL/Hr IV Intermittent (22 @ 08:59)   100 mL/Hr IV Intermittent (22 @ 09:03)    valGANciclovir   450 milliGRAM(s) Oral (22 @ 12:33)   450 milliGRAM(s) Oral (22 @ 11:12)      ANTIMICROBIALS:    cefTRIAXone   IVPB 1000 every 24 hours  valGANciclovir 450 daily    OTHER MEDS: MEDICATIONS  (STANDING):  atorvastatin 80 at bedtime  dextrose 50% Injectable 25 once  dextrose 50% Injectable 12.5 once  dextrose 50% Injectable 25 once  dextrose Oral Gel 15 once PRN  everolimus (ZORTRESS) 4 <User Schedule>  glucagon  Injectable 1 once  heparin   Injectable 5000 every 12 hours  insulin glargine Injectable (LANTUS) 8 at bedtime  insulin lispro (ADMELOG) corrective regimen sliding scale  three times a day before meals  insulin lispro (ADMELOG) corrective regimen sliding scale  at bedtime  insulin lispro Injectable (ADMELOG) 6 three times a day before meals  mycophenolate mofetil 250 two times a day  pantoprazole    Tablet 40 before breakfast  predniSONE   Tablet 5 daily  sertraline 100 daily  tamsulosin 0.4 at bedtime    SOCIAL HISTORY: no smoking or etoh use    FAMILY HISTORY:  Family history of stomach cancer    Family history of hypertension    Family history of type 2 diabetes mellitus      REVIEW OF SYSTEMS  [  ] ROS unobtainable because:    [ x ] All other systems negative except as noted below:	    Constitutional:  [ ] fever [ ] chills  [ ] weight loss  [ ] weakness  Skin:  [ ] rash [ ] phlebitis	  Eyes: [ ] icterus [ ] pain  [ ] discharge	  ENMT: [ ] sore throat  [ ] thrush [ ] ulcers [ ] exudates  Respiratory: [ ] dyspnea [ ] hemoptysis [ ] cough [ ] sputum	  Cardiovascular:  [ ] chest pain [ ] palpitations [ ] edema	  Gastrointestinal:  [ ] nausea [ ] vomiting [ ] diarrhea [ ] constipation [ ] pain	  Genitourinary:  [ ] dysuria [ ] frequency [ ] hematuria [ ] discharge [ ] flank pain  [ ] incontinence  Musculoskeletal:  [ ] myalgias [ ] arthralgias [ ] arthritis  [ ] back pain +rib pain  Neurological:  [ ] headache [ ] seizures  [ ] confusion/altered mental status  Psychiatric:  [ ] anxiety [ ] depression	  Hematology/Lymphatics:  [ ] lymphadenopathy  Endocrine:  [ ] adrenal [ ] thyroid  Allergic/Immunologic:	 [ ] transplant [ ] seasonal    Vital Signs Last 24 Hrs  T(F): 98.7 (22 @ 12:18), Max: 98.9 (22 @ 02:00)  Vital Signs Last 24 Hrs  HR: 74 (22 @ 12:18) (74 - 83)  BP: 172/78 (22 @ 12:18) (156/72 - 172/78)  RR: 18 (22 @ 12:18)  SpO2: 94% (22 @ 12:18) (93% - 94%)  Wt(kg): --    PHYSICAL EXAMINATION:  General: Alert and Awake, NAD  HEENT: PERRL, EOMI  Neck: Supple, No CHELSEA  Cardiac: RRR, No M/R/G  Resp: CTAB, No Wh/Rh/Ra  Abdomen: NBS, NT/ND, No HSM, No rigidity or guarding  MSK: No LE edema. No stigmata of IE. No evidence of phlebitis. No evidence of synovitis.  Skin: No rashes or lesions. Skin is warm and dry to the touch.   Neuro: Alert and Awake. CN 2-12 Grossly intact. Moves all four extremities spontaneously.  Psych: Calm, Pleasant, Cooperative                          9.6    3.71  )-----------( 140      ( 2022 07:14 )             28.6     06-    140  |  109<H>  |  40<H>  ----------------------------<  221<H>  4.2   |  18<L>  |  1.98<H>    Ca    9.2      2022 07:16  Phos  2.5       Mg     2.0         TPro  6.9  /  Alb  3.7  /  TBili  0.3  /  DBili  x   /  AST  28  /  ALT  84<H>  /  AlkPhos  115  06-17    Urinalysis Basic - ( 15 Rachid 2022 18:55 )    Color: Yellow / Appearance: Slightly Turbid / S.020 / pH: x  Gluc: x / Ketone: Negative  / Bili: Negative / Urobili: Negative   Blood: x / Protein: 100 / Nitrite: Negative   Leuk Esterase: Moderate / RBC: 11-25 /HPF / WBC 26-50   Sq Epi: x / Non Sq Epi: Occasional / Bacteria: Few    MICROBIOLOGY:  Culture - Urine (collected 2022 10:07)  Source: Clean Catch Clean Catch (Midstream)  Final Report (2022 14:27):    <10,000 CFU/mL Normal Urogenital Shantel    Culture - Urine (collected 2022 00:52)  Source: Clean Catch Clean Catch (Midstream)  Final Report (2022 23:14):    <10,000 CFU/mL Normal Urogenital Shantel    CMVPCR Log: NotDetec [id number]/mL ( @ 17:21)    RADIOLOGY:    <The imaging below has been reviewed and visualized by me independently. Findings as detailed in report below>    < from: CT Abdomen and Pelvis No Cont (06.15.22 @ 17:39) >  IMPRESSION:  Acute nondisplaced left seventh anterior rib fracture  Small area of linear atelectasis versus infiltrate in the posterior left   upper lobe  5 mm distal left ureteral calculus causing hydroureteronephrosis  Mild bladder wall thickening appearing more prominent on the left.    < end of copied text >  < from: US Kidney and Bladder (22 @ 14:48) >  IMPRESSION:  Mild left hydronephrosis, stable to mildly improved since the prior CT.    Nonvisualization of the left ureter. Unable to assess for the presence of   the left ureteral calculus seen on the prior CT exam.    Bilateral ureteral jets are visualized.    Trace debris within the urinary bladder. Correlate with urinalysis.    < end of copied text >

## 2022-06-17 NOTE — PROGRESS NOTE ADULT - SUBJECTIVE AND OBJECTIVE BOX
Transplant Surgery - Multidisciplinary Rounds  --------------------------------------------------------------  OLT 7/2/2020    Present:   Patient seen and examined with multidisciplinary Transplant team including  (Surgeon: Dr. Dagher. Hepatologist: Dr. Blankenship. Pharmacist: OLY Blanca, RNs in AM rounds.   Disciplines not in attendance will be notified of the plan.     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    s/p OLT 7/2/20 (course c/b VRE bacteremia, L foot OM, CNI toxicity to both Tacrolimus and Cyclosporine; switched to Everolimus 7/27/20    Admitted from 12/20/21 to 1/6/22 s/p fall at home, no LOC or injuries.  - Found to be +Covid w/ CT chest findings concerning for beginnings of multifocal PNA. Received remdesivir and mononclonal antibodies and on dexamethasone (12/29-1/7).   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 PCP prophylaxis changed from bactrim to atovaquone for LFTs.   -Patient was found to have DVT on L femoral vein and started on full treatment AC, now on Eliquis 5mg BID.     ------------  Transferred to [facility] from [facility] on 6/16/2022 where he was admitted for multiple falls over 24hr period.  +HA, +neck pain, +L rib pain.  Imaging revealed:  -C5-C7 mod-severe spinal stenosis  -L rib fx  -5mm distal L ureteral stone  -diarrhea    Interval Events:  -afebrile, VSS  -diarrhea has resolved  -c/o tooth discomfort (has cap) limiting PO  -baseline independent post-txp, occasionally requiring walker.  in-house, pt with continued b/l LE weakness limiting mobility  -neck and L rib pain, somewhat controlled  -tolerating PO  -UO 1L without issues, fran    Potential Discharge date: pending clinical stability    Education:  Medications    Plan of care:  See Below    MEDICATIONS  (STANDING):  atorvastatin 80 milliGRAM(s) Oral at bedtime  cefTRIAXone   IVPB 1000 milliGRAM(s) IV Intermittent every 24 hours  dextrose 5%. 1000 milliLiter(s) (100 mL/Hr) IV Continuous <Continuous>  dextrose 5%. 1000 milliLiter(s) (50 mL/Hr) IV Continuous <Continuous>  dextrose 50% Injectable 25 Gram(s) IV Push once  dextrose 50% Injectable 12.5 Gram(s) IV Push once  dextrose 50% Injectable 25 Gram(s) IV Push once  everolimus (ZORTRESS) 4 milliGRAM(s) Oral <User Schedule>  folic acid 1 milliGRAM(s) Oral daily  glucagon  Injectable 1 milliGRAM(s) IntraMuscular once  heparin   Injectable 5000 Unit(s) SubCutaneous every 12 hours  insulin glargine Injectable (LANTUS) 8 Unit(s) SubCutaneous at bedtime  insulin lispro (ADMELOG) corrective regimen sliding scale   SubCutaneous three times a day before meals  insulin lispro (ADMELOG) corrective regimen sliding scale   SubCutaneous at bedtime  insulin lispro Injectable (ADMELOG) 6 Unit(s) SubCutaneous three times a day before meals  lidocaine   4% Patch 1 Patch Transdermal daily  lidocaine   4% Patch 1 Patch Transdermal daily  mycophenolate mofetil 250 milliGRAM(s) Oral two times a day  omega-3-Acid Ethyl Esters 1 Gram(s) Oral two times a day  pantoprazole    Tablet 40 milliGRAM(s) Oral before breakfast  predniSONE   Tablet 5 milliGRAM(s) Oral daily  sertraline 100 milliGRAM(s) Oral daily  sodium chloride 0.9%. 1000 milliLiter(s) (75 mL/Hr) IV Continuous <Continuous>  tamsulosin 0.4 milliGRAM(s) Oral at bedtime  valGANciclovir 450 milliGRAM(s) Oral daily    MEDICATIONS  (PRN):  dextrose Oral Gel 15 Gram(s) Oral once PRN Blood Glucose LESS THAN 70 milliGRAM(s)/deciliter        Vital Signs Last 24 Hrs  T(C): 37.1 (17 Jun 2022 12:18), Max: 37.2 (17 Jun 2022 02:00)  T(F): 98.7 (17 Jun 2022 12:18), Max: 98.9 (17 Jun 2022 02:00)  HR: 74 (17 Jun 2022 12:18) (74 - 83)  BP: 172/78 (17 Jun 2022 12:18) (106/65 - 172/78)  BP(mean): --  RR: 18 (17 Jun 2022 12:18) (18 - 18)  SpO2: 94% (17 Jun 2022 12:18) (93% - 97%)    I&O's Summary    16 Jun 2022 07:01  -  17 Jun 2022 07:00  --------------------------------------------------------  IN: 1140 mL / OUT: 920 mL / NET: 220 mL                              9.6    3.71  )-----------( 140      ( 17 Jun 2022 07:14 )             28.6     06-17    140  |  109<H>  |  40<H>  ----------------------------<  221<H>  4.2   |  18<L>  |  1.98<H>    Ca    9.2      17 Jun 2022 07:16  Phos  2.5     06-17  Mg     2.0     06-17    TPro  6.9  /  Alb  3.7  /  TBili  0.3  /  DBili  x   /  AST  28  /  ALT  84<H>  /  AlkPhos  115  06-17        Review of systems  Gen: No weight changes, fatigue, fevers/chills, see hpi  Skin: No rashes  Head/Eyes/Ears/Mouth: No headache; Normal hearing; Normal vision w/o blurriness; No sinus pain/discomfort, sore throat  Respiratory: No dyspnea, cough, wheezing, hemoptysis  CV: No chest pain, PND, orthopnea  GI:  no diarrhea, constipation, nausea, vomiting, melena, hematochezia  : No increased frequency, dysuria, hematuria, nocturia  MSK: see hpi, no edema  Neuro: No dizziness/lightheadedness, weakness, seizures, numbness, tingling  Heme: No easy bruising or bleeding  Endo: No heat/cold intolerance  Psych: No significant nervousness, anxiety, stress, depression  All other systems were reviewed and are negative, except as noted.      PHYSICAL EXAM:  Constitutional: Well developed / well nourished  Eyes: anicteric, PERRLA  ENMT: nc/at, no thrush, loose front tooth  Neck: supple  Respiratory: CTA B/L  Cardiovascular: RRR.  Gastrointestinal: Soft abdomen, ND/NT. well healed incisional scar  Genitourinary: Voiding spontaneously  Extremities: SCD's in place and working bilaterally, no calf TTP. no edema. L rib pain to palpation  Vascular: Palpable dp pulses bilaterally.   Neurological: A&O x3  Skin: no rashes, ulcerations, lesions  Musculoskeletal: Moving all extremities, decreased strength throughout  Psychiatric: Responsive

## 2022-06-17 NOTE — CONSULT NOTE ADULT - SUBJECTIVE AND OBJECTIVE BOX
[facility]  Ramón Falk PA-C  [address] [place] [address]  [phone]      Chief Complaint:  Patient is a 66y old  Male who presents with a chief complaint of ureteral calculus, hydroureteronephrosis (2022 13:23)      HPI: PMHx of IDDM, HLD, obesity, HFpEF with mild LV diastolic dysfunction, MGUS, chronic anemia with a history of duodenal ulcer as well as GAVE and duodenal AVM s/p APC (last on 10/11/19), 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 Multiple hospitalizations due to UTIs, emphysematous cystitis (2020) and prostate abscess (3/2020).  He is transferred to [facility] from [facility] where he was admitted on 6/15/22 for multiple falls over 24hr period.   As per pt he had 3 falls in 24hrs, he was on the floor for 3 hours after last fall and crawled to the phone to call for help. He is c/o head,neck,left side pain, and mild nausea on way to ED. On Ct he was found to have a 5 mm distal left ureteral calculus causing hydroureteronephrosis He denies LOC, dizziness, vomiting, weakness, numbness, sob or chest pain    Of note he was admitted from 21 to 22 s/p fall at home, no LOC or injuries. Found to be +Covid w/ CT chest findings concerning for beginnings of multifocal PNA. Received remdesivir and mononclonal antibodies and on dexamethasone (-). 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 PCP prophylaxis changed from bactrim to atovaquone for LFTs. Endocrine consulted for steroid induced hyperglycemia. Patient was found to have DVT on L femoral vein and started on full treatment AC, now on Eliquis 5mg BID.     Allergies:  codeine (Anaphylaxis)      Medications:  atorvastatin 80 milliGRAM(s) Oral at bedtime  cefTRIAXone   IVPB 1000 milliGRAM(s) IV Intermittent every 24 hours  dextrose 5%. 1000 milliLiter(s) IV Continuous <Continuous>  dextrose 5%. 1000 milliLiter(s) IV Continuous <Continuous>  dextrose 50% Injectable 25 Gram(s) IV Push once  dextrose 50% Injectable 12.5 Gram(s) IV Push once  dextrose 50% Injectable 25 Gram(s) IV Push once  dextrose Oral Gel 15 Gram(s) Oral once PRN  everolimus (ZORTRESS) 4 milliGRAM(s) Oral <User Schedule>  folic acid 1 milliGRAM(s) Oral daily  glucagon  Injectable 1 milliGRAM(s) IntraMuscular once  heparin   Injectable 5000 Unit(s) SubCutaneous every 12 hours  insulin glargine Injectable (LANTUS) 8 Unit(s) SubCutaneous at bedtime  insulin lispro (ADMELOG) corrective regimen sliding scale   SubCutaneous three times a day before meals  insulin lispro (ADMELOG) corrective regimen sliding scale   SubCutaneous at bedtime  insulin lispro Injectable (ADMELOG) 6 Unit(s) SubCutaneous three times a day before meals  lidocaine   4% Patch 1 Patch Transdermal daily  lidocaine   4% Patch 1 Patch Transdermal daily  mycophenolate mofetil 250 milliGRAM(s) Oral two times a day  omega-3-Acid Ethyl Esters 1 Gram(s) Oral two times a day  pantoprazole    Tablet 40 milliGRAM(s) Oral before breakfast  predniSONE   Tablet 5 milliGRAM(s) Oral daily  sertraline 100 milliGRAM(s) Oral daily  sodium chloride 0.9%. 1000 milliLiter(s) IV Continuous <Continuous>  tamsulosin 0.4 milliGRAM(s) Oral at bedtime  valGANciclovir 450 milliGRAM(s) Oral daily      PMHX/PSHX:  Diabetes    Hypercholesteremia    Neuropathy    Hypertension    Renal stones    Fatty liver disease, nonalcoholic    Obesity    Hepatic encephalopathy    GERD with esophagitis    GIB (gastrointestinal bleeding)    No significant past surgical history    S/P cholecystectomy        Family history:  No pertinent family history in first degree relatives    Family history of stomach cancer    Family history of hypertension    Family history of type 2 diabetes mellitus        Social History:     ROS:     General:  No wt loss, fevers, chills, night sweats, fatigue,   Eyes:  Good vision, no reported pain  ENT:  No sore throat, pain, runny nose, dysphagia  CV:  No pain, palpitations, hypo/hypertension  Resp:  No dyspnea, cough, tachypnea, wheezing  GI:  No pain, No nausea, No vomiting, No diarrhea, No constipation, No weight loss, No fever, No pruritis, No rectal bleeding, No tarry stools, No dysphagia,  :  No pain, bleeding, incontinence, nocturia  Muscle:  + pain, weakness  Neuro:  No weakness, tingling, memory problems  Psych:  No fatigue, insomnia, mood problems, depression  Endocrine:  No polyuria, polydipsia, cold/heat intolerance  Heme:  No petechiae, ecchymosis, easy bruisability  Skin:  No rash, tattoos, scars, edema      PHYSICAL EXAM:   Vital Signs:  Vital Signs Last 24 Hrs  T(C): 37.1 (2022 12:18), Max: 37.2 (2022 02:00)  T(F): 98.7 (2022 12:18), Max: 98.9 (2022 02:00)  HR: 74 (2022 12:18) (74 - 83)  BP: 172/78 (2022 12:18) (156/72 - 172/78)  BP(mean): --  RR: 18 (2022 12:18) (18 - 18)  SpO2: 94% (2022 12:18) (93% - 94%)  Daily     Daily     GENERAL:  Appears stated age,   HEENT:  NC/AT,    CHEST:  Full & symmetric excursion,   HEART:  Regular rhythm  ABDOMEN:  Soft, non-tender, non-distended,   EXTEREMITIES:  no cyanosis,clubbing or edema  SKIN:  No rash  NEURO:  Alert,    LABS:                        9.6    3.71  )-----------( 140      ( 2022 07:14 )             28.6     -    140  |  109<H>  |  40<H>  ----------------------------<  221<H>  4.2   |  18<L>  |  1.98<H>    Ca    9.2      2022 07:16  Phos  2.5       Mg     2.0         TPro  6.9  /  Alb  3.7  /  TBili  0.3  /  DBili  x   /  AST  28  /  ALT  84<H>  /  AlkPhos  115  06-17    LIVER FUNCTIONS - ( 2022 07:16 )  Alb: 3.7 g/dL / Pro: 6.9 g/dL / ALK PHOS: 115 U/L / ALT: 84 U/L / AST: 28 U/L / GGT: x           PT/INR - ( 2022 09:03 )   PT: 15.6 sec;   INR: 1.35 ratio         PTT - ( 2022 09:03 )  PTT:29.7 sec  Urinalysis Basic - ( 15 Rachid 2022 18:55 )    Color: Yellow / Appearance: Slightly Turbid / S.020 / pH: x  Gluc: x / Ketone: Negative  / Bili: Negative / Urobili: Negative   Blood: x / Protein: 100 / Nitrite: Negative   Leuk Esterase: Moderate / RBC: 11-25 /HPF / WBC 26-50   Sq Epi: x / Non Sq Epi: Occasional / Bacteria: Few          Imaging:  < from: CT Abdomen and Pelvis No Cont (06.15.22 @ 17:39) >    ACC: [id number] EXAM:  CT CHEST                        ACC: [id number] EXAM:  CT ABDOMEN AND PELVIS                          *** ADDENDUM***    Comparison is made with the plain films of the pelvis from earlier in the   day. Linear lucency suspected atthe level of the left greater trochanter   is appreciated on the CT scan as well however is not felt to represent   traumatic injury. This likely represents heterotopic ossification.    --- End of Report ---    *** END OF ADDENDUM***      PROCEDURE DATE:  06/15/2022          INTERPRETATION:  CLINICAL INFORMATION: Found on the floor. History of   falls. Diabetes fatty liver renal stones and cholecystectomy    COMPARISON: CT chest of 2021 and CT chest and abdomen of 2020.    CONTRAST/COMPLICATIONS:  IV Contrast: NONE  0 cc administered   0 cc discarded  Oral Contrast: NONE  Complications: None reported at time of study completion    PROCEDURE:  CT of the Chest, Abdomen and Pelvis was performed.  Sagittal and coronal reformats were performed.    FINDINGS:  CHEST:  LUNGS AND LARGE AIRWAYS: Patent central airways. No pulmonary nodules.   There is patchy density in the left upper lobe posteriorly which may   represent small area of infiltrate versus atelectasis. There is also   linear atelectasis or scarring at the left lung base  PLEURA: No pleural effusion.  VESSELS: Atherosclerotic changes of the aorta and coronary arteries.  HEART: Heart size is normal. No pericardial effusion.  MEDIASTINUM AND RONNIE: No lymphadenopathy.  CHEST WALL AND LOWER NECK: Within normal limits.    ABDOMEN AND PELVIS:  LIVER: Within normal limits.  BILE DUCTS: Normal caliber.  GALLBLADDER: Cholecystectomy.  SPLEEN: Within normal limits.  PANCREAS: Within normal limits.  ADRENALS: Within normal limits.  KIDNEYS/URETERS: There is new mild left hydroureter and hydronephrosis to   the level of the distal ureteral calculus seen on images [id number]   measuring 5 mm.    BLADDER: Mild wall thickening appears slightly asymmetric to the left.  REPRODUCTIVE ORGANS: Prostate within normal limits.    BOWEL: No bowel obstruction. Appendix is normal.  PERITONEUM: No ascites.  VESSELS: Atherosclerotic changes.  RETROPERITONEUM/LYMPH NODES: No lymphadenopathy.  ABDOMINAL WALL: Within normal limits.  BONES: Degenerative changes. There are subacute/chronic appearing rib   fractures involving the right eighth and ninth ribs not seen on the   previous study. There is a nondisplaced probable acute fracture of the   left seventh anterior rib    IMPRESSION:  Acute nondisplaced left seventh anterior rib fracture  Small area of linear atelectasis versus infiltrate in the posterior left   upper lobe  5 mm distal left ureteral calculus causing hydroureteronephrosis  Mild bladder wall thickening appearing more prominent on the left.    --- End of Report ---    ***Please see the addendum at the top of this report. It may contain   additional important information or changes.****        CALE JAVED MD; Attending Radiologist  This document has been electronically signed. Rachid 15 2022  5:56PM  Addend:CALE JAVED MD; Attending Radiologist  This addendum was electronically signed on: Rachid 15 2022  6:40PM.    < end of copied text >

## 2022-06-17 NOTE — PROGRESS NOTE ADULT - ASSESSMENT
66M w/ PMHx JEAN cirrhosis s/p OLT 7/2/20 (course c/b VRE bacteremia, CNI toxicity to both tacro and cyclosporine; switched to everolimus 7/27/20), HLD, IDDM, obesity, HFpEF presenting w/ fall and ureteral stone to [facility] on 6/15/22, now transferred to [facility] for further management.     Impression:   #Fall: now second admission for falls  #Ureteral stone c/b hydronephrosis   #Elevated liver enzymes: liver biopsy 12/30/21 without e/o rejection, found to have NRH  #JEAN cirrhosis s/p LT  #CMV viremia   #DM  #Anemia      Recommendations:  - evaluation of ureteral stone/management per urology   - immunosuppression: everolimus 4 mg BID (dose by level), and CellCept 250 mg BID, and prednisone 5mg daily  - prophylactic dose of Valcyte 450 mg daily  - will need to discuss with ID re: remaining on valcyte ppx      Thank you for involving us in the care of this patient, please reach out if any further questions.     Ky Ram MD  Gastroenterology/Hepatology Fellow, PGY5    Available on [employer] Teams  [phone] ([facility])  [phone] ([facility])  Please contact on call fellow weekdays after 5pm-7am and weekends: [phone]   66M w/ PMHx JEAN cirrhosis s/p OLT 7/2/20 (course c/b VRE bacteremia, CNI toxicity to both tacro and cyclosporine; switched to everolimus 7/27/20), HLD, IDDM, obesity, HFpEF presenting w/ fall and ureteral stone to [facility] on 6/15/22, now transferred to [facility] for further management.     Impression:   #Fall, rib fracture: now second admission for falls c/b rib fracture   #Ureteral stone c/b hydronephrosis   #Elevated liver enzymes: liver biopsy 12/30/21 without e/o rejection, found to have NRH  #JEAN cirrhosis s/p LT  #CMV viremia   #DM  #Anemia      Recommendations:  - plan to repeat US renal to evaluate hydronephrosis/passage of stone   - evaluation of ureteral stone/management per urology   - neuro evaluation re: recurrent falls  - immunosuppression: everolimus 4 mg BID (dose by level), and CellCept 250 mg BID, and prednisone 5mg daily  - prophylactic dose of Valcyte 450 mg daily  - will need to discuss with ID re: remaining on valcyte ppx      *Note not final unless signed by attending    Thank you for involving us in the care of this patient, please reach out if any further questions.     Ky Ram MD  Gastroenterology/Hepatology Fellow, PGY5    Available on [employer] Teams  [phone] ([facility])  [phone] ([facility])  Please contact on call fellow weekdays after 5pm-7am and weekends: [phone]

## 2022-06-17 NOTE — CONSULT NOTE ADULT - ASSESSMENT
anemia   Hx GIB  decompensated JEAN/Cirrhosis s/p liver transplant   ureteral calculus    diarrhea resolved  no signs of GI bleeding  CBC daily  diet as tolerated  lfts improving   ct noted   urology following   care per transplant services appreciated   will follow  laurent pt

## 2022-06-17 NOTE — CONSULT NOTE ADULT - ASSESSMENT
66 year old male PMH DDLT (07/2020, c/b post-op VRE bacteremia), prior ESBL E. coli prostate abscess, R heel OM (12/2020), C. Diff (12/2020), DM, MGUS, and HFpEF, decompensated JEAN cirrhosis status post liver transplant 2020 for JEAN who presents with falls.    U/A with pyuria and hematuria  UCx with <10K normal urogenital ninfa (but appears it may have been obtained following dose of Ceftriaxone at [place])  CT A/P with 5 mm distal left ureteral calculus causing hydroureteronephrosis.   Repeat Renal US with slight improvement in left sided hydronephrosis    Suspect urinaylsis findings secondary to kidney stone  Lower concern for UTI at present and patient not otherwise systemically ill to suggest infection above level of kidney stone  Can complete one more day of Ceftriaxone with dose tomorrow to complete empiric UTI treatment course    #Abnormal Urinalysis, Nephrolithiasis  --Recommend completing one more dose of Ceftriaxone tomorrow  --Follow WBC  --Follow BUN/Cr  --Follow temperature curve  --Follow up with urology with respect to management of nephrolithiasis    #OLT Recipient (CMV D+/R-), CMV Viremia  Patient with intermittent low level CMV Viremia.  Started on Valcyte 450 mg PO Q24H in 11/2021   Equivalent of prophylaxis dosing when account for his renal function  293 (11/17/21)  <96 (11/19/21)  150 (11/29/21)  <96 (12/21/21)  <96 (12/31/21)  <96 (1/27/22)  346 (3/28/22)  Not Detected (5/9/22)  Not Detected (6/16/22)  --No absolute indication to continue Secondary CMV Prophylaxis at this point. CMV Viremia did not reach significant viral load or end organ disease. Also now with relative leukopenia and thrombocytopenia. Favor discontinuing Valcyte at this point. Would continue to monitor CMV PCR's with his outpatient followups.    I will follow the patient as needed. Please feel free to contact me with any further questions or concerns.    Eusebio Pérez M.D.  [facility] Division of Infectious Disease  8AM-5PM Monday - Friday: Available on [employer] Teams  After Hours and Holidays (or if no response on Microsoft Teams): Please contact the Infectious Diseases Office at [phone]     The above assessment and plan were discussed with [facility] PA  66 year old male PMH DDLT (07/2020, c/b post-op VRE bacteremia), prior ESBL E. coli prostate abscess, R heel OM (12/2020), C. Diff (12/2020), DM, MGUS, and HFpEF, decompensated JEAN cirrhosis status post liver transplant 2020 for JEAN who presents with falls.    U/A with pyuria and hematuria  UCx with <10K normal urogenital ninfa (but appears it may have been obtained following dose of Ceftriaxone at [place])  CT A/P with 5 mm distal left ureteral calculus causing hydroureteronephrosis.   Repeat Renal US with slight improvement in left sided hydronephrosis    Suspect urinaylsis findings secondary to kidney stone  Lower concern for UTI at present and patient not otherwise systemically ill to suggest infection above level of kidney stone  Can complete one more day of Ceftriaxone with dose tomorrow to complete empiric UTI treatment course    #Abnormal Urinalysis, Nephrolithiasis  --Recommend completing one more dose of Ceftriaxone tomorrow  --Follow WBC  --Follow BUN/Cr  --Follow temperature curve  --Follow up with urology with respect to management of nephrolithiasis    #OLT Recipient (CMV D+/R-), CMV Viremia  Patient with intermittent low level CMV Viremia.  Started on Valcyte 450 mg PO Q24H in 11/2021   Equivalent of prophylaxis dosing when accounting for his renal function  293 (11/17/21)  <96 (11/19/21)  150 (11/29/21)  <96 (12/21/21)  <96 (12/31/21)  <96 (1/27/22)  346 (3/28/22)  Not Detected (5/9/22)  Not Detected (6/16/22)  --No absolute indication to continue Secondary CMV Prophylaxis at this point. Completed >1 month of Valcyte from negative PCR. CMV Viremia did not reach significant viral load or end organ disease. Also now with relative leukopenia and thrombocytopenia. Favor discontinuing Valcyte at this point. Would continue to monitor CMV PCR's with his outpatient followups.    I will follow the patient as needed. Please feel free to contact me with any further questions or concerns.    Eusebio Pérez M.D.  [facility] Division of Infectious Disease  8AM-5PM Monday - Friday: Available on [employer] Teams  After Hours and Holidays (or if no response on Microsoft Teams): Please contact the Infectious Diseases Office at [phone]     The above assessment and plan were discussed with [facility] PA  66 year old male PMH DDLT (07/2020, c/b post-op VRE bacteremia), prior ESBL E. coli prostate abscess, R heel OM (12/2020), C. Diff (12/2020), DM, MGUS, and HFpEF, decompensated JEAN cirrhosis status post liver transplant 2020 for JEAN who presents with falls.    U/A with pyuria and hematuria  UCx with <10K normal urogenital ninfa (but appears it may have been obtained following dose of Ceftriaxone at [place])  CT A/P with 5 mm distal left ureteral calculus causing hydroureteronephrosis.   Repeat Renal US with slight improvement in left sided hydronephrosis    Suspect urinaylsis findings secondary to kidney stone  Lower concern for UTI at present and patient not otherwise systemically ill to suggest infection above level of kidney stone  Can complete one more day of Ceftriaxone with dose tomorrow to complete empiric UTI treatment course    #Abnormal Urinalysis, Nephrolithiasis  --Recommend completing one more dose of Ceftriaxone tomorrow  --Follow WBC  --Follow BUN/Cr  --Follow temperature curve  --Follow up with urology with respect to management of nephrolithiasis    #OLT Recipient (CMV D+/R-), CMV Viremia  Patient with intermittent low level CMV Viremia.  Started on Valcyte 450 mg PO Q24H in 11/2021   Equivalent of prophylaxis dosing when accounting for his renal function  293 (11/17/21)  <96 (11/19/21)  150 (11/29/21)  <96 (12/21/21)  <96 (12/31/21)  <96 (1/27/22)  346 (3/28/22)  Not Detected (5/9/22)  Not Detected (6/16/22)  --No absolute indication to continue Secondary CMV Prophylaxis at this point. Completed >1 month of Valcyte from negative PCR. CMV Viremia did not reach significant viral load or end organ disease. Also now with relative leukopenia and thrombocytopenia. Favor discontinuing Valcyte at this point. Would continue to monitor CMV PCR's with his outpatient followups.    I will follow the patient as needed. Please feel free to contact me with any further questions or concerns.    Eusebio Pérez M.D.  [facility] Division of Infectious Disease  8AM-5PM Monday - Friday: Available on [employer] Teams  After Hours and Holidays (or if no response on Microsoft Teams): Please contact the Infectious Diseases Office at [phone]     The above assessment and plan were discussed with [facility] PA

## 2022-06-17 NOTE — CONSULT NOTE ADULT - ASSESSMENT
Patient is a 66y old  Male who presents with a chief complaint of ureteral calculus, hydroureteronephrosis (17 Jun 2022 17:33)      HPI:  PMHx of IDDM, HLD, obesity, HFpEF with mild LV diastolic dysfunction, MGUS, chronic anemia with a history of duodenal ulcer as well as GAVE and duodenal AVM s/p APC (last on 10/11/19), 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  He is transferred to [facility] from [facility] where he was admitted on 6/15/22 for multiple falls over 24hr period.   As per pt he had 3 falls in 24hrs, he was on the floor for 3 hours after last fall and crawled to the phone to call for help. He is c/o head,neck,left side pain, and mild nausea on way to ED. On Ct he was found to have a 5 mm distal left ureteral calculus causing hydroureteronephrosis He denies LOC, dizziness, vomiting, weakness, numbness, sob or chest pain    Of note he was admitted from 12/20/21 to 1/6/22 s/p fall at home, no LOC or injuries. Found to be +Covid w/ CT chest findings concerning for beginnings of multifocal PNA. Received remdesivir and mononclonal antibodies and on dexamethasone (12/29-1/7). 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 PCP prophylaxis changed from bactrim to atovaquone for LFTs. Endocrine consulted for steroid induced hyperglycemia. Patient was found to have DVT on L femoral vein and started on full treatment AC, now on Eliquis 5mg BID.     Cause(s) of Liver Disease: Nonalcoholic Steatohepatitis (JEAN)   Transplant Organ(s): Liver   Donor Characteristics: no [employer] increased risk, no hepatitis c (DANA+), not hepatitis B core Ab positive   Prashant Segundo is a 66 yr old PMHx JEAN Cirrhosis s/p DDLT (7/2/2020, c/b post-op VRE bacteremia), h/o R. heel OM (12/2020), (16 Jun 2022 06:56)      PAST MEDICAL & SURGICAL HISTORY:  Diabetes      Hypercholesteremia      Neuropathy      Hypertension      Renal stones  25 years ago      Fatty liver disease, nonalcoholic      Obesity      Hepatic encephalopathy      GERD with esophagitis  Gastritis &amp; Non Bleeding Ulcers      GIB (gastrointestinal bleeding)      S/P cholecystectomy        MEDICATIONS  (STANDING):  atorvastatin 80 milliGRAM(s) Oral at bedtime  cefTRIAXone   IVPB 1000 milliGRAM(s) IV Intermittent every 24 hours  dextrose 5%. 1000 milliLiter(s) (100 mL/Hr) IV Continuous <Continuous>  dextrose 5%. 1000 milliLiter(s) (50 mL/Hr) IV Continuous <Continuous>  dextrose 50% Injectable 25 Gram(s) IV Push once  dextrose 50% Injectable 12.5 Gram(s) IV Push once  dextrose 50% Injectable 25 Gram(s) IV Push once  everolimus (ZORTRESS) 4 milliGRAM(s) Oral <User Schedule>  folic acid 1 milliGRAM(s) Oral daily  glucagon  Injectable 1 milliGRAM(s) IntraMuscular once  heparin   Injectable 5000 Unit(s) SubCutaneous every 12 hours  insulin glargine Injectable (LANTUS) 8 Unit(s) SubCutaneous at bedtime  insulin lispro (ADMELOG) corrective regimen sliding scale   SubCutaneous three times a day before meals  insulin lispro (ADMELOG) corrective regimen sliding scale   SubCutaneous at bedtime  insulin lispro Injectable (ADMELOG) 6 Unit(s) SubCutaneous three times a day before meals  lidocaine   4% Patch 1 Patch Transdermal daily  lidocaine   4% Patch 1 Patch Transdermal daily  mycophenolate mofetil 250 milliGRAM(s) Oral two times a day  omega-3-Acid Ethyl Esters 1 Gram(s) Oral two times a day  pantoprazole    Tablet 40 milliGRAM(s) Oral before breakfast  predniSONE   Tablet 5 milliGRAM(s) Oral daily  sertraline 100 milliGRAM(s) Oral daily  sodium chloride 0.9%. 1000 milliLiter(s) (75 mL/Hr) IV Continuous <Continuous>  tamsulosin 0.4 milliGRAM(s) Oral at bedtime    MEDICATIONS  (PRN):  dextrose Oral Gel 15 Gram(s) Oral once PRN Blood Glucose LESS THAN 70 milliGRAM(s)/deciliter      Allergies    codeine (Anaphylaxis)    Intolerances        FAMILY HISTORY:  Family history of stomach cancer    Family history of hypertension    Family history of type 2 diabetes mellitus    Vital Signs Last 24 Hrs  T(C): 36.8 (17 Jun 2022 21:06), Max: 37.2 (17 Jun 2022 02:00)  T(F): 98.3 (17 Jun 2022 21:06), Max: 98.9 (17 Jun 2022 02:00)  HR: 76 (17 Jun 2022 21:06) (74 - 79)  BP: 148/72 (17 Jun 2022 21:06) (148/72 - 172/78)  BP(mean): --  RR: 16 (17 Jun 2022 21:06) (16 - 18)  SpO2: 93% (17 Jun 2022 21:06) (93% - 94%)    EOE:               Mandible FROM             Facial bones and MOM grossly intact             ( -  ) trismus             (  - ) LAD             (  - ) swelling             (  - ) asymmetry             (  - ) palpation             (  - ) SOB             (  - ) dysphagia             (  - ) LOC    IOE:  permanent dentition with multiple missing teeth            hard/soft palate: WNL           tongue/FOM WNL           labial/buccal mucosa WNL           ( -  ) percussion           (  - ) palpation           (  - ) swelling     - no mobility noted  - multiple retained root tips  - multiple carious teeth  - no signs of acute odontogenic infection noted    Radiographs: Pan and PAs taken and interpreted: permanent dentition with multiple missing teeth; multiple retained root tips; #9 and #10 implants; several carious teeth; #7 PARL; no other abnormalities noted    LABS:                        9.6    3.71  )-----------( 140      ( 17 Jun 2022 07:14 )             28.6     06-17    140  |  109<H>  |  40<H>  ----------------------------<  221<H>  4.2   |  18<L>  |  1.98<H>    Ca    9.2      17 Jun 2022 07:16  Phos  2.5     06-17  Mg     2.0     06-17    TPro  6.9  /  Alb  3.7  /  TBili  0.3  /  DBili  x   /  AST  28  /  ALT  84<H>  /  AlkPhos  115  06-17    WBC Count: 3.71 K/uL *L* [3.80 - 10.50] (06-17 @ 07:14)    Platelet Count - Automated: 140 K/uL *L* [150 - 400] (06-17 @ 07:14)  Platelet Count - Automated: 142 K/uL *L* [150 - 400] (06-16 @ 09:03)    INR: 1.35 ratio *H* [0.88 - 1.16] (06-16 @ 09:03)    Culture Results:   <10,000 CFU/mL Normal Urogenital Shantel (06-16 @ 10:07)  Culture Results:   <10,000 CFU/mL Normal Urogenital Shantel (06-16 @ 00:52)    ASSESSMENT: No signs of acute odontogenic infection.     RECOMMENDATIONS:   1) Dental F/U with outpatient dentist for comprehensive dental care.     Pinky Thomas DMD, #[id number]  Oral surgeon consulted: Dr. Misael Tineo

## 2022-06-17 NOTE — PROGRESS NOTE ADULT - ASSESSMENT
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    s/p OLT 7/2/20 (course c/b VRE bacteremia, L Foot OM, CNI toxicity to both Tacrolimus and Cyclosporine; switched to Everolimus 7/27/20).  Recent syncope and fall, COVID all in 12/2022.  Transferred s/p fall from [place]    s/p fall   -CT neck with mod-severe spinal stenosis with associated pain. Neuro/NeuroSx teams consulted, appreciate input  -MRI neck non-con  -PT/OT  -no cardiac concerns at this time  -Dental c/s for loose dentition    L ureteral stone  -Appreciate  input  -continue PO/IV hydration and Flomax in hopes stone will pass  -will check US   -mild MISA on arrival, Cr trending down    s/p OLT  -good graft function  -SQH/SCDs    Immunosuppression  -continue Everolimus per level, MMF  250BID, Pred 5  -PPx: Valcyte (recent low level viremia, Transplant ID to review), PPI 66M with h/o IDDM, HLD, obesity, HFpEF with mild LV diastolic dysfunction, MGUS, AOCD, with a history of duodenal ulcer as well as GAVE and duodenal AVM s/p APC (last on 10/11/19), 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    s/p OLT 7/2/20 (course c/b VRE bacteremia, L Foot OM, CNI toxicity to both Tacrolimus and Cyclosporine; switched to Everolimus 7/27/20).  Recent syncope and fall, COVID all in 12/2022.  Transferred s/p fall from [place]    s/p fall   -CT neck with mod-severe spinal stenosis with associated pain. Neuro/NeuroSx teams consulted, appreciate input  -MRI neck non-con  -PT/OT  -no cardiac concerns at this time  -Dental c/s for loose dentition    L ureteral stone with hydronephrosis  -Appreciate  input  -continue PO/IV hydration and Flomax in hopes stone will pass  -will check US   -mild MISA on arrival, Cr trending down    s/p OLT  -good graft function  -SQH/SCDs    Immunosuppression  -continue Everolimus per level, MMF  250BID, Pred 5  -PPx: Valcyte (recent low level viremia, Transplant ID to review), PPI

## 2022-06-17 NOTE — PROGRESS NOTE ADULT - SUBJECTIVE AND OBJECTIVE BOX
Chief Complaint:  Patient is a 66y old  Male who presents with a chief complaint of ureteral calculus, hydroureteronephrosis (2022 13:19)      Interval Events:   NAEON, afebrile HD stable       Allergies:  codeine (Anaphylaxis)        Hospital Medications:  atorvastatin 80 milliGRAM(s) Oral at bedtime  cefTRIAXone   IVPB 1000 milliGRAM(s) IV Intermittent every 24 hours  dextrose 5%. 1000 milliLiter(s) IV Continuous <Continuous>  dextrose 5%. 1000 milliLiter(s) IV Continuous <Continuous>  dextrose 50% Injectable 25 Gram(s) IV Push once  dextrose 50% Injectable 12.5 Gram(s) IV Push once  dextrose 50% Injectable 25 Gram(s) IV Push once  dextrose Oral Gel 15 Gram(s) Oral once PRN  everolimus (ZORTRESS) 4 milliGRAM(s) Oral <User Schedule>  folic acid 1 milliGRAM(s) Oral daily  glucagon  Injectable 1 milliGRAM(s) IntraMuscular once  heparin   Injectable 5000 Unit(s) SubCutaneous every 12 hours  insulin glargine Injectable (LANTUS) 8 Unit(s) SubCutaneous at bedtime  insulin lispro (ADMELOG) corrective regimen sliding scale   SubCutaneous three times a day before meals  insulin lispro (ADMELOG) corrective regimen sliding scale   SubCutaneous at bedtime  insulin lispro Injectable (ADMELOG) 6 Unit(s) SubCutaneous three times a day before meals  lidocaine   4% Patch 1 Patch Transdermal daily  lidocaine   4% Patch 1 Patch Transdermal daily  mycophenolate mofetil 250 milliGRAM(s) Oral two times a day  omega-3-Acid Ethyl Esters 1 Gram(s) Oral two times a day  pantoprazole    Tablet 40 milliGRAM(s) Oral before breakfast  predniSONE   Tablet 5 milliGRAM(s) Oral daily  sertraline 100 milliGRAM(s) Oral daily  sodium chloride 0.9%. 1000 milliLiter(s) IV Continuous <Continuous>  tamsulosin 0.4 milliGRAM(s) Oral at bedtime  valGANciclovir 450 milliGRAM(s) Oral daily      PMHX/PSHX:  Diabetes    Hypercholesteremia    Neuropathy    Hypertension    Renal stones    Fatty liver disease, nonalcoholic    Obesity    Hepatic encephalopathy    GERD with esophagitis    GIB (gastrointestinal bleeding)    No significant past surgical history    S/P cholecystectomy        Family history:  No pertinent family history in first degree relatives    Family history of stomach cancer    Family history of hypertension    Family history of type 2 diabetes mellitus        ROS:     General:  No wt loss, fevers, chills, night sweats, fatigue,   Eyes:  Good vision, no reported pain  ENT:  No sore throat, pain, runny nose, dysphagia  CV:  No pain, palpitations, hypo/hypertension  Pulm:  No dyspnea, cough, tachypnea, wheezing  GI:  see above  :  No pain, bleeding, incontinence, nocturia  Muscle:  No pain, weakness  Neuro:  No weakness, tingling, memory problems  Psych:  No fatigue, insomnia, mood problems, depression  Endocrine:  No polyuria, polydipsia, cold/heat intolerance  Heme:  No petechiae, ecchymosis, easy bruisability  Skin:  No rash, tattoos, scars, edema      PHYSICAL EXAM:   Vital Signs:  Vital Signs Last 24 Hrs  T(C): 37.1 (2022 12:18), Max: 37.2 (2022 02:00)  T(F): 98.7 (2022 12:18), Max: 98.9 (2022 02:00)  HR: 74 (2022 12:18) (74 - 83)  BP: 172/78 (2022 12:18) (106/65 - 172/78)  BP(mean): --  RR: 18 (2022 12:18) (18 - 18)  SpO2: 94% (2022 12:18) (93% - 97%)  Daily     Daily     GENERAL:  No acute distress  HEENT:  Normocephalic/atraumatic, no scleral icterus  CHEST:  Clear to auscultation bilaterally, no wheezes/rales/ronchi, no accessory muscle use  HEART:  Regular rate and rhythm, no murmurs/rubs/gallops  ABDOMEN:  Soft, non-tender, non-distended, normoactive bowel sounds, +chevron incision   EXTREMITIES: No cyanosis, clubbing, or edema  SKIN:  No rash/warm/dry  NEURO:  Alert and oriented x 3    LABS:                        9.6    3.71  )-----------( 140      ( 2022 07:14 )             28.6     Mean Cell Volume: 89.4 fl (-22 @ 07:14)    -    140  |  109<H>  |  40<H>  ----------------------------<  221<H>  4.2   |  18<L>  |  1.98<H>    Ca    9.2      2022 07:16  Phos  2.5     06-17  Mg     2.0     06-17    TPro  6.9  /  Alb  3.7  /  TBili  0.3  /  DBili  x   /  AST  28  /  ALT  84<H>  /  AlkPhos  115  06-17    LIVER FUNCTIONS - ( 2022 07:16 )  Alb: 3.7 g/dL / Pro: 6.9 g/dL / ALK PHOS: 115 U/L / ALT: 84 U/L / AST: 28 U/L / GGT: x           PT/INR - ( 2022 09:03 )   PT: 15.6 sec;   INR: 1.35 ratio         PTT - ( 2022 09:03 )  PTT:29.7 sec  Urinalysis Basic - ( 15 Rachid 2022 18:55 )    Color: Yellow / Appearance: Slightly Turbid / S.020 / pH: x  Gluc: x / Ketone: Negative  / Bili: Negative / Urobili: Negative   Blood: x / Protein: 100 / Nitrite: Negative   Leuk Esterase: Moderate / RBC: 11-25 /HPF / WBC 26-50   Sq Epi: x / Non Sq Epi: Occasional / Bacteria: Few                              9.6    3.71  )-----------( 140      ( 2022 07:14 )             28.6                         9.6    4.03  )-----------( 142      ( 2022 09:03 )             28.8                         10.4   5.37  )-----------( 156      ( 15 Rachid 2022 16:37 )             30.5       Imaging:             Chief Complaint:  Patient is a 66y old  Male who presents with a chief complaint of ureteral calculus, hydroureteronephrosis (2022 13:19)      Interval Events:   NAEON, afebrile HD stable   Had episode of diarrhea, now resolved  Denies abd pain, n/v   Urinating without discomfort       Allergies:  codeine (Anaphylaxis)        Hospital Medications:  atorvastatin 80 milliGRAM(s) Oral at bedtime  cefTRIAXone   IVPB 1000 milliGRAM(s) IV Intermittent every 24 hours  dextrose 5%. 1000 milliLiter(s) IV Continuous <Continuous>  dextrose 5%. 1000 milliLiter(s) IV Continuous <Continuous>  dextrose 50% Injectable 25 Gram(s) IV Push once  dextrose 50% Injectable 12.5 Gram(s) IV Push once  dextrose 50% Injectable 25 Gram(s) IV Push once  dextrose Oral Gel 15 Gram(s) Oral once PRN  everolimus (ZORTRESS) 4 milliGRAM(s) Oral <User Schedule>  folic acid 1 milliGRAM(s) Oral daily  glucagon  Injectable 1 milliGRAM(s) IntraMuscular once  heparin   Injectable 5000 Unit(s) SubCutaneous every 12 hours  insulin glargine Injectable (LANTUS) 8 Unit(s) SubCutaneous at bedtime  insulin lispro (ADMELOG) corrective regimen sliding scale   SubCutaneous three times a day before meals  insulin lispro (ADMELOG) corrective regimen sliding scale   SubCutaneous at bedtime  insulin lispro Injectable (ADMELOG) 6 Unit(s) SubCutaneous three times a day before meals  lidocaine   4% Patch 1 Patch Transdermal daily  lidocaine   4% Patch 1 Patch Transdermal daily  mycophenolate mofetil 250 milliGRAM(s) Oral two times a day  omega-3-Acid Ethyl Esters 1 Gram(s) Oral two times a day  pantoprazole    Tablet 40 milliGRAM(s) Oral before breakfast  predniSONE   Tablet 5 milliGRAM(s) Oral daily  sertraline 100 milliGRAM(s) Oral daily  sodium chloride 0.9%. 1000 milliLiter(s) IV Continuous <Continuous>  tamsulosin 0.4 milliGRAM(s) Oral at bedtime  valGANciclovir 450 milliGRAM(s) Oral daily      PMHX/PSHX:  Diabetes    Hypercholesteremia    Neuropathy    Hypertension    Renal stones    Fatty liver disease, nonalcoholic    Obesity    Hepatic encephalopathy    GERD with esophagitis    GIB (gastrointestinal bleeding)    No significant past surgical history    S/P cholecystectomy        Family history:  No pertinent family history in first degree relatives    Family history of stomach cancer    Family history of hypertension    Family history of type 2 diabetes mellitus        ROS:     General:  No wt loss, fevers, chills, night sweats, fatigue,   Eyes:  Good vision, no reported pain  ENT:  No sore throat, pain, runny nose, dysphagia  CV:  No pain, palpitations, hypo/hypertension  Pulm:  No dyspnea, cough, tachypnea, wheezing  GI:  see above  :  No pain, bleeding, incontinence, nocturia  Muscle:  No pain, weakness  Neuro:  No weakness, tingling, memory problems  Psych:  No fatigue, insomnia, mood problems, depression  Endocrine:  No polyuria, polydipsia, cold/heat intolerance  Heme:  No petechiae, ecchymosis, easy bruisability  Skin:  No rash, tattoos, scars, edema      PHYSICAL EXAM:   Vital Signs:  Vital Signs Last 24 Hrs  T(C): 37.1 (2022 12:18), Max: 37.2 (2022 02:00)  T(F): 98.7 (2022 12:18), Max: 98.9 (2022 02:00)  HR: 74 (2022 12:18) (74 - 83)  BP: 172/78 (2022 12:18) (106/65 - 172/78)  BP(mean): --  RR: 18 (2022 12:18) (18 - 18)  SpO2: 94% (2022 12:18) (93% - 97%)  Daily     Daily     GENERAL:  No acute distress  HEENT:  Normocephalic/atraumatic, no scleral icterus  CHEST:  Clear to auscultation bilaterally, no wheezes/rales/ronchi, no accessory muscle use  HEART:  Regular rate and rhythm, no murmurs/rubs/gallops  ABDOMEN:  Soft, non-tender, non-distended, normoactive bowel sounds, +chevron incision   EXTREMITIES: No cyanosis, clubbing, or edema  SKIN:  No rash/warm/dry  NEURO:  Alert and oriented x 3    LABS:                        9.6    3.71  )-----------( 140      ( 2022 07:14 )             28.6     Mean Cell Volume: 89.4 fl (-22 @ 07:14)    06-17    140  |  109<H>  |  40<H>  ----------------------------<  221<H>  4.2   |  18<L>  |  1.98<H>    Ca    9.2      2022 07:16  Phos  2.5       Mg     2.0     -    TPro  6.9  /  Alb  3.7  /  TBili  0.3  /  DBili  x   /  AST  28  /  ALT  84<H>  /  AlkPhos  115  -17    LIVER FUNCTIONS - ( 2022 07:16 )  Alb: 3.7 g/dL / Pro: 6.9 g/dL / ALK PHOS: 115 U/L / ALT: 84 U/L / AST: 28 U/L / GGT: x           PT/INR - ( 2022 09:03 )   PT: 15.6 sec;   INR: 1.35 ratio         PTT - ( 2022 09:03 )  PTT:29.7 sec  Urinalysis Basic - ( 15 Rachid 2022 18:55 )    Color: Yellow / Appearance: Slightly Turbid / S.020 / pH: x  Gluc: x / Ketone: Negative  / Bili: Negative / Urobili: Negative   Blood: x / Protein: 100 / Nitrite: Negative   Leuk Esterase: Moderate / RBC: 11-25 /HPF / WBC 26-50   Sq Epi: x / Non Sq Epi: Occasional / Bacteria: Few                              9.6    3.71  )-----------( 140      ( 2022 07:14 )             28.6                         9.6    4.03  )-----------( 142      ( 2022 09:03 )             28.8                         10.4   5.37  )-----------( 156      ( 15 Rachid 2022 16:37 )             30.5       Imaging:

## 2022-06-17 NOTE — CHART NOTE - NSCHARTNOTEFT_GEN_A_CORE
Neurology    Called by primary team regarding increase number of falls and extremity weakness. Reviewed CT head and c spine that was performed on 6/15 w/ neuro attending and there is concern for severe stenosis of C spine. Can consider MR imaging /  neurosurgical evaluation if within goals of care. If workup is unrevealing then can page us back for to evaluate if further workup for falls is warranted.    --------------------------------------------    ACC: [id number] EXAM:  CT CERVICAL SPINE                        ACC: [id number] EXAM:  CT BRAIN                        PROCEDURE DATE:  06/15/2022      INTERPRETATION:  INDICATION: Headache with neck pain status post trauma.  COMPARISON EXAMINATION:  CT 12/19/2021    FINDINGS:    HEMISPHERES:  Involutional changes are noted with volume loss. Small vessel ischemic changes are scattered within the white matter. Similar findings were noted previously. No acute abnormality or hemorrhage suggested.  VENTRICLES:  Midline with ex vacuo enlargement  POSTERIOR FOSSA:  No acute abnormality noted  EXTRACEREBRAL SPACES:  No subdural or epidural collections are noted.  SKULL BASE AND CALVARIUM:  Appears intact.  No fracture or destructive   lesion is identified.  SINUSES AND MASTOIDS:  Mild scattered mucosal thickening noted in   sinuses. Mastoids are clear.    FINDINGS: There is alteration of the cervical lordosis which may reflect positioning or spasm.    C1/C2  :  The anterior and posterior arches of C1 appear to be intact. There is no C1-C2 subluxation. No odontoid fracture is noted. Base of C2 appears to be intact    The mid and lower cervical vertebral bodies appear to be intact. No upper thoracic fracture is noted.    There are extensive degenerative changes at C5-6 and C6-7 with moderate to severe stenosis. Less advanced degenerative changes are noted in the other cervical levels.    Prevertebral soft tissues are unremarkable.    BRAIN  IMPRESSION:  1)  chronic ischemic changes are noted in both hemispheres with atrophy. No acute abnormality suggested.  2)  no intracerebral hemorrhage, contusion, or extracerebral collections are identified.    CERVICAL IMPRESSION: No acute fracture identified. Multilevel degenerative changes, extensive at C5-6 and C6-7. Moderate to severe stenosis noted at these levels. If clinically warranted, cervical MR imaging may be considered to assess for possible cord encroachment.    --- End of Report --- Neurology    Called by primary team regarding increase number of falls and extremity weakness. Reviewed CT head and c spine that was performed on 6/15 w/ neuro attending and there is concern for severe stenosis of C spine. Can consider MR imaging /  spine service evaluation if within goals of care. If workup is unrevealing then can page us back for to evaluate if further workup for falls is warranted.    --------------------------------------------    ACC: [id number] EXAM:  CT CERVICAL SPINE                        ACC: [id number] EXAM:  CT BRAIN                        PROCEDURE DATE:  06/15/2022      INTERPRETATION:  INDICATION: Headache with neck pain status post trauma.  COMPARISON EXAMINATION:  CT 12/19/2021    FINDINGS:    HEMISPHERES:  Involutional changes are noted with volume loss. Small vessel ischemic changes are scattered within the white matter. Similar findings were noted previously. No acute abnormality or hemorrhage suggested.  VENTRICLES:  Midline with ex vacuo enlargement  POSTERIOR FOSSA:  No acute abnormality noted  EXTRACEREBRAL SPACES:  No subdural or epidural collections are noted.  SKULL BASE AND CALVARIUM:  Appears intact.  No fracture or destructive   lesion is identified.  SINUSES AND MASTOIDS:  Mild scattered mucosal thickening noted in   sinuses. Mastoids are clear.    FINDINGS: There is alteration of the cervical lordosis which may reflect positioning or spasm.    C1/C2  :  The anterior and posterior arches of C1 appear to be intact. There is no C1-C2 subluxation. No odontoid fracture is noted. Base of C2 appears to be intact    The mid and lower cervical vertebral bodies appear to be intact. No upper thoracic fracture is noted.    There are extensive degenerative changes at C5-6 and C6-7 with moderate to severe stenosis. Less advanced degenerative changes are noted in the other cervical levels.    Prevertebral soft tissues are unremarkable.    BRAIN  IMPRESSION:  1)  chronic ischemic changes are noted in both hemispheres with atrophy. No acute abnormality suggested.  2)  no intracerebral hemorrhage, contusion, or extracerebral collections are identified.    CERVICAL IMPRESSION: No acute fracture identified. Multilevel degenerative changes, extensive at C5-6 and C6-7. Moderate to severe stenosis noted at these levels. If clinically warranted, cervical MR imaging may be considered to assess for possible cord encroachment.    --- End of Report ---

## 2022-06-18 ENCOUNTER — TRANSCRIPTION ENCOUNTER (OUTPATIENT)
Age: 67
End: 2022-06-18

## 2022-06-18 LAB
A1C WITH ESTIMATED AVERAGE GLUCOSE RESULT: 7.8 % — HIGH (ref 4–5.6)
ALBUMIN SERPL ELPH-MCNC: 3.5 G/DL — SIGNIFICANT CHANGE UP (ref 3.3–5)
ALP SERPL-CCNC: 109 U/L — SIGNIFICANT CHANGE UP (ref 40–120)
ALT FLD-CCNC: 71 U/L — HIGH (ref 10–45)
ANION GAP SERPL CALC-SCNC: 10 MMOL/L — SIGNIFICANT CHANGE UP (ref 5–17)
AST SERPL-CCNC: 24 U/L — SIGNIFICANT CHANGE UP (ref 10–40)
BILIRUB SERPL-MCNC: 0.2 MG/DL — SIGNIFICANT CHANGE UP (ref 0.2–1.2)
BUN SERPL-MCNC: 37 MG/DL — HIGH (ref 7–23)
CALCIUM SERPL-MCNC: 8.8 MG/DL — SIGNIFICANT CHANGE UP (ref 8.4–10.5)
CHLORIDE SERPL-SCNC: 110 MMOL/L — HIGH (ref 96–108)
CO2 SERPL-SCNC: 19 MMOL/L — LOW (ref 22–31)
CREAT SERPL-MCNC: 1.92 MG/DL — HIGH (ref 0.5–1.3)
EGFR: 38 ML/MIN/1.73M2 — LOW
ESTIMATED AVERAGE GLUCOSE: 177 MG/DL — HIGH (ref 68–114)
GLUCOSE BLDC GLUCOMTR-MCNC: 156 MG/DL — HIGH (ref 70–99)
GLUCOSE BLDC GLUCOMTR-MCNC: 191 MG/DL — HIGH (ref 70–99)
GLUCOSE BLDC GLUCOMTR-MCNC: 263 MG/DL — HIGH (ref 70–99)
GLUCOSE BLDC GLUCOMTR-MCNC: 291 MG/DL — HIGH (ref 70–99)
GLUCOSE SERPL-MCNC: 288 MG/DL — HIGH (ref 70–99)
HCT VFR BLD CALC: 27.8 % — LOW (ref 39–50)
HGB BLD-MCNC: 9.2 G/DL — LOW (ref 13–17)
MAGNESIUM SERPL-MCNC: 1.8 MG/DL — SIGNIFICANT CHANGE UP (ref 1.6–2.6)
MCHC RBC-ENTMCNC: 30.4 PG — SIGNIFICANT CHANGE UP (ref 27–34)
MCHC RBC-ENTMCNC: 33.1 GM/DL — SIGNIFICANT CHANGE UP (ref 32–36)
MCV RBC AUTO: 91.7 FL — SIGNIFICANT CHANGE UP (ref 80–100)
NRBC # BLD: 0 /100 WBCS — SIGNIFICANT CHANGE UP (ref 0–0)
PHOSPHATE SERPL-MCNC: 2.7 MG/DL — SIGNIFICANT CHANGE UP (ref 2.5–4.5)
PLATELET # BLD AUTO: 137 K/UL — LOW (ref 150–400)
POTASSIUM SERPL-MCNC: 4.2 MMOL/L — SIGNIFICANT CHANGE UP (ref 3.5–5.3)
POTASSIUM SERPL-SCNC: 4.2 MMOL/L — SIGNIFICANT CHANGE UP (ref 3.5–5.3)
PROT SERPL-MCNC: 6.3 G/DL — SIGNIFICANT CHANGE UP (ref 6–8.3)
RBC # BLD: 3.03 M/UL — LOW (ref 4.2–5.8)
RBC # FLD: 13.3 % — SIGNIFICANT CHANGE UP (ref 10.3–14.5)
SODIUM SERPL-SCNC: 139 MMOL/L — SIGNIFICANT CHANGE UP (ref 135–145)
WBC # BLD: 3.2 K/UL — LOW (ref 3.8–10.5)
WBC # FLD AUTO: 3.2 K/UL — LOW (ref 3.8–10.5)

## 2022-06-18 PROCEDURE — 99223 1ST HOSP IP/OBS HIGH 75: CPT | Mod: GC

## 2022-06-18 PROCEDURE — 99232 SBSQ HOSP IP/OBS MODERATE 35: CPT | Mod: GC

## 2022-06-18 RX ORDER — HYDROMORPHONE HYDROCHLORIDE 2 MG/ML
0.25 INJECTION INTRAMUSCULAR; INTRAVENOUS; SUBCUTANEOUS ONCE
Refills: 0 | Status: DISCONTINUED | OUTPATIENT
Start: 2022-06-18 | End: 2022-06-18

## 2022-06-18 RX ORDER — TRAMADOL HYDROCHLORIDE 50 MG/1
25 TABLET ORAL EVERY 12 HOURS
Refills: 0 | Status: DISCONTINUED | OUTPATIENT
Start: 2022-06-18 | End: 2022-06-25

## 2022-06-18 RX ORDER — POLYETHYLENE GLYCOL 3350 17 G/17G
17 POWDER, FOR SOLUTION ORAL ONCE
Refills: 0 | Status: COMPLETED | OUTPATIENT
Start: 2022-06-18 | End: 2022-06-18

## 2022-06-18 RX ORDER — SODIUM CHLORIDE 9 MG/ML
1000 INJECTION INTRAMUSCULAR; INTRAVENOUS; SUBCUTANEOUS
Refills: 0 | Status: DISCONTINUED | OUTPATIENT
Start: 2022-06-18 | End: 2022-06-19

## 2022-06-18 RX ADMIN — Medication 5 MILLIGRAM(S): at 06:34

## 2022-06-18 RX ADMIN — PANTOPRAZOLE SODIUM 40 MILLIGRAM(S): 20 TABLET, DELAYED RELEASE ORAL at 06:35

## 2022-06-18 RX ADMIN — HEPARIN SODIUM 5000 UNIT(S): 5000 INJECTION INTRAVENOUS; SUBCUTANEOUS at 17:00

## 2022-06-18 RX ADMIN — INSULIN GLARGINE 8 UNIT(S): 100 INJECTION, SOLUTION SUBCUTANEOUS at 21:28

## 2022-06-18 RX ADMIN — HYDROMORPHONE HYDROCHLORIDE 0.25 MILLIGRAM(S): 2 INJECTION INTRAMUSCULAR; INTRAVENOUS; SUBCUTANEOUS at 03:04

## 2022-06-18 RX ADMIN — MYCOPHENOLATE MOFETIL 250 MILLIGRAM(S): 250 CAPSULE ORAL at 06:34

## 2022-06-18 RX ADMIN — SERTRALINE 100 MILLIGRAM(S): 25 TABLET, FILM COATED ORAL at 11:39

## 2022-06-18 RX ADMIN — LIDOCAINE 1 PATCH: 4 CREAM TOPICAL at 19:45

## 2022-06-18 RX ADMIN — HEPARIN SODIUM 5000 UNIT(S): 5000 INJECTION INTRAVENOUS; SUBCUTANEOUS at 06:35

## 2022-06-18 RX ADMIN — SODIUM CHLORIDE 50 MILLILITER(S): 9 INJECTION INTRAMUSCULAR; INTRAVENOUS; SUBCUTANEOUS at 06:33

## 2022-06-18 RX ADMIN — Medication 2: at 17:49

## 2022-06-18 RX ADMIN — TRAMADOL HYDROCHLORIDE 25 MILLIGRAM(S): 50 TABLET ORAL at 22:00

## 2022-06-18 RX ADMIN — CEFTRIAXONE 100 MILLIGRAM(S): 500 INJECTION, POWDER, FOR SOLUTION INTRAMUSCULAR; INTRAVENOUS at 08:08

## 2022-06-18 RX ADMIN — TAMSULOSIN HYDROCHLORIDE 0.4 MILLIGRAM(S): 0.4 CAPSULE ORAL at 21:28

## 2022-06-18 RX ADMIN — Medication 6 UNIT(S): at 09:00

## 2022-06-18 RX ADMIN — Medication 6 UNIT(S): at 12:47

## 2022-06-18 RX ADMIN — Medication 1 GRAM(S): at 17:00

## 2022-06-18 RX ADMIN — TRAMADOL HYDROCHLORIDE 25 MILLIGRAM(S): 50 TABLET ORAL at 21:27

## 2022-06-18 RX ADMIN — ATORVASTATIN CALCIUM 80 MILLIGRAM(S): 80 TABLET, FILM COATED ORAL at 21:28

## 2022-06-18 RX ADMIN — Medication 6: at 09:00

## 2022-06-18 RX ADMIN — POLYETHYLENE GLYCOL 3350 17 GRAM(S): 17 POWDER, FOR SOLUTION ORAL at 12:04

## 2022-06-18 RX ADMIN — LIDOCAINE 1 PATCH: 4 CREAM TOPICAL at 11:34

## 2022-06-18 RX ADMIN — LIDOCAINE 1 PATCH: 4 CREAM TOPICAL at 11:32

## 2022-06-18 RX ADMIN — EVEROLIMUS 4 MILLIGRAM(S): 10 TABLET ORAL at 08:35

## 2022-06-18 RX ADMIN — LIDOCAINE 1 PATCH: 4 CREAM TOPICAL at 23:19

## 2022-06-18 RX ADMIN — Medication 1 GRAM(S): at 06:34

## 2022-06-18 RX ADMIN — Medication 6: at 12:46

## 2022-06-18 RX ADMIN — EVEROLIMUS 4 MILLIGRAM(S): 10 TABLET ORAL at 19:58

## 2022-06-18 RX ADMIN — Medication 1 MILLIGRAM(S): at 11:32

## 2022-06-18 RX ADMIN — LIDOCAINE 1 PATCH: 4 CREAM TOPICAL at 11:00

## 2022-06-18 RX ADMIN — HYDROMORPHONE HYDROCHLORIDE 0.25 MILLIGRAM(S): 2 INJECTION INTRAMUSCULAR; INTRAVENOUS; SUBCUTANEOUS at 03:48

## 2022-06-18 RX ADMIN — Medication 6 UNIT(S): at 17:49

## 2022-06-18 RX ADMIN — MYCOPHENOLATE MOFETIL 250 MILLIGRAM(S): 250 CAPSULE ORAL at 17:00

## 2022-06-18 NOTE — PROGRESS NOTE ADULT - ASSESSMENT
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    s/p OLT 7/2/20 (course c/b VRE bacteremia, L Foot OM, CNI toxicity to both Tacrolimus and Cyclosporine; switched to Everolimus 7/27/20).  Recent syncope and fall, COVID all in 12/2022.  Transferred s/p fall from [facility]    s/p fall   -CT neck with mod-severe spinal stenosis with associated pain. Neuro/NeuroSx teams consulted, appreciate input  -MRI neck non-con with mod canal stenosis  - Spoke with neurology who rec neurosurgery (spine)  - Neurosx called for consult will fu recs  -PT/OT  -no cardiac concerns at this time  -Dental consult appreciated Rec;SL: No signs of acute odontogenic infection, fu outpt for comprehensive dental care    L ureteral stone with hydronephrosis  -Appreciate  input  -continue PO/IV hydration and Flomax in hopes stone will pass  -Renal U/S with no acute stone, mild hydro   -mild MISA on arrival, Cr trending down    s/p OLT  -good graft function  -SQH/SCDs    Immunosuppression  -continue Everolimus per level, MMF  250BID, Pred 5  -PPx: Valcyte (recent low level viremia, Transplant ID to review), PPI

## 2022-06-18 NOTE — DIETITIAN INITIAL EVALUATION ADULT - PERTINENT MEDS FT
MEDICATIONS  (STANDING):  atorvastatin 80 milliGRAM(s) Oral at bedtime  dextrose 5%. 1000 milliLiter(s) (100 mL/Hr) IV Continuous <Continuous>  dextrose 5%. 1000 milliLiter(s) (50 mL/Hr) IV Continuous <Continuous>  dextrose 50% Injectable 25 Gram(s) IV Push once  dextrose 50% Injectable 12.5 Gram(s) IV Push once  dextrose 50% Injectable 25 Gram(s) IV Push once  everolimus (ZORTRESS) 4 milliGRAM(s) Oral <User Schedule>  folic acid 1 milliGRAM(s) Oral daily  glucagon  Injectable 1 milliGRAM(s) IntraMuscular once  heparin   Injectable 5000 Unit(s) SubCutaneous every 12 hours  insulin glargine Injectable (LANTUS) 8 Unit(s) SubCutaneous at bedtime  insulin lispro (ADMELOG) corrective regimen sliding scale   SubCutaneous three times a day before meals  insulin lispro (ADMELOG) corrective regimen sliding scale   SubCutaneous at bedtime  insulin lispro Injectable (ADMELOG) 6 Unit(s) SubCutaneous three times a day before meals  lidocaine   4% Patch 1 Patch Transdermal daily  lidocaine   4% Patch 1 Patch Transdermal daily  mycophenolate mofetil 250 milliGRAM(s) Oral two times a day  omega-3-Acid Ethyl Esters 1 Gram(s) Oral two times a day  pantoprazole    Tablet 40 milliGRAM(s) Oral before breakfast  predniSONE   Tablet 5 milliGRAM(s) Oral daily  sertraline 100 milliGRAM(s) Oral daily  sodium chloride 0.9%. 1000 milliLiter(s) (50 mL/Hr) IV Continuous <Continuous>  tamsulosin 0.4 milliGRAM(s) Oral at bedtime    MEDICATIONS  (PRN):  dextrose Oral Gel 15 Gram(s) Oral once PRN Blood Glucose LESS THAN 70 milliGRAM(s)/deciliter  traMADol 25 milliGRAM(s) Oral every 12 hours PRN Severe Pain (7 - 10)

## 2022-06-18 NOTE — CONSULT NOTE ADULT - ATTENDING COMMENTS
HPI as per resident note, personally verified by me. Patient reports he has had falls for the past 6 months to 1 year and often his legs will just give out on him. He has been reporting neck pain during this time as well as having difficulty building muscle bulk and strength in his BUE's. No bowel or bladder incontinence and no saddle anesthesia.    MS: SARAHO x3.  Follows commands. Attn/conc, recent and remote memory, fund of knowledge WNL  Language: Speech is clear, fluent, normal volume, good repetition, naming, comprehension, registration of words.  CNs: PERRL (R 2mm, L 2mm). has difficulty seeing in all quadrants (chronic) even with/without glasses but grossly VFF to motion. Can read close up. EOMI no nystagmus. V1-3 intact LT, No facial asymmetry b/l, full. Hearing normal (rubbing fingers) b/l. Tongue midline. Shoulder shrug symmetric b/l    Motor: Significantly decreased muscle bulk in proximal BUE's with normal tone. Otherwise normal muscle bulk & tone. BUE's 5/5 all except for distal LUE 4+/5 (pain limited). BLE's proximally 4+/5 proximally and 5/5 distally    Sensation: Intact to LT, vibration, proprioception, temp b/l UE. Decreased LT, proprioception (appreciates at ankles b/l), vibration (mid shin in R and worse in L)  DTR's: 3+ R biceps, 2+ and brisk remainder of BUE's, 3+ KJ b/l, 0+ AJ b/l, and neutral R and downgoing L plantar response  Coordination: No dysmetria to FTN b/l UE, cannot assess HTS due to pain. R > L intention tremor with postural component  Gait and station: Cannot assess as unable to bring patient to standing position due to pain     A/P:  Falls  Weakness  Cervical spine stenosis  Left rib fracture  DM type 2  Heart failure  L femoral DVT on Eliquis    - Etiology for repeated falls and BUE proximal weakness likely combination of cervical spinal stenosis with myelopathic symptoms (multilevel changes but most severe at C6-C7 as per MRI). However, need to also consider more distal spinal stenosis causing further myelopathy or radiculopathy given symptoms in his legs. Likely also related to his peripheral neuropathy and need to consider additional toxic/metabolic cause  - MRI c-spine w/o with noted symptoms as above. Would also check MRI t-spine w/o and MRI l-spine w/o  - Check labs for additional causes with B12, TSH, free T4, Vit D, CPK, ESR, CRP, A1C, B1, B6, copper, syphilis serology TP; can follow-up results as outpatient  - Appreciate NRS input  - Pain control  - PT/OT as tolerated  - May benefit from outpatient EMG/NCS (Dr. Gaurang Viera, [phone])  - Continue to address above medical problems, as you are doing  - Will continue to follow patient peripherally with you, please call with additional questions or concerns

## 2022-06-18 NOTE — DIETITIAN INITIAL EVALUATION ADULT - CONTINUE CURRENT NUTRITION CARE PLAN
- Continue consistent CHO diet   - monitor for DASH diet (pt asked not to add) Monitor tolerance, add Low Fat restrictions as needed.   - monitor PO intake, labs and adust diet as needed. RD remains available to review diet education and adjust diet recommendations as needed./yes

## 2022-06-18 NOTE — CONSULT NOTE ADULT - ASSESSMENT
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 States he has had increase in falls over few months but particularly increased since friday. Had 3 falls since last friday all with his L leg giving out although he notes his L leg is stronger than other. He fell off chair on tuesday similarly and also tripped in bathroom. Has head, neck, left side pain. During events denied headaches, vision changes, dizziness, slurred speech, facial droop, new focal weakness/ numbness worse than baseline.      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 Exam notable for diminished muscle bulk proximally of upper extremities, brisk reflexes UE, decreased motor str and sensation in LE, positive cross adductor.      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    Recommendations:   [ ] Although likely secondary to cervical spinal canal stenosis, can obtain MR thoracic and lumbar spine w/o con  [ ] Given likely chronicity of pathology, low current clinical utility for steroids  [ ] PT/OT, pain management, fall precautions  [ ] Rest per neurosurgery    Discussed above recommendations w/ neuro attending and was seen by attending on rounds. Recommendations finalized once attested.

## 2022-06-18 NOTE — DIETITIAN INITIAL EVALUATION ADULT - ORAL INTAKE PTA/DIET HISTORY
Pt interviewed at bedside. Reports good appetite PTA, reports following low Na low CHO diet PTA. Confirms NKFA. Pt reports taking dietary supplements/micronutrients PTA: iron, vit c, vit d, b12. Pt denies history of chewing/swallowing difficulty.

## 2022-06-18 NOTE — DISCHARGE NOTE PROVIDER - NSDCFUSCHEDAPPT_GEN_ALL_CORE_FT
Yomi Medina  [facility]  Hepatology [address] [place] [address]   Scheduled Appointment: 06/23/2022    [facility]  [facility]  Scheduled Appointment: 07/05/2022    [facility]  [facility]  Scheduled Appointment: 07/18/2022    Pedro Luis Selby  [facility]  [facility]  Scheduled Appointment: 07/25/2022    [facility]  [facility]  Scheduled Appointment: 08/01/2022    [facility]  [facility]  Scheduled Appointment: 08/15/2022    [facility]  [facility]  Scheduled Appointment: 08/29/2022     [facility]  [facility]  Scheduled Appointment: 07/05/2022    [facility]  [facility]  Scheduled Appointment: 07/18/2022    Pedro Luis Selby  [facility]  [facility]  Scheduled Appointment: 07/25/2022    [facility]  [facility]  Scheduled Appointment: 08/01/2022    [facility]  [facility]  Scheduled Appointment: 08/15/2022    [facility]  [facility]  Scheduled Appointment: 08/29/2022

## 2022-06-18 NOTE — PROGRESS NOTE ADULT - ASSESSMENT
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     Impression:   #Fall, rib fracture: now second admission for falls c/b rib fracture   #Ureteral stone c/b hydronephrosis, stable/improved  #Elevated liver enzymes: liver biopsy 12/30/21 without e/o rejection, found to have NRH  #JEAN cirrhosis s/p LT  #CMV viremia   #DM  #Anemia    Recommendations:  - renal US reveals stable-improved left hydronephrosis  - evaluation of ureteral stone/management per urology   - neuro evaluation re: recurrent falls  - immunosuppression: everolimus 4 mg BID (dose by level), and CellCept 250 mg BID, and prednisone 5mg daily  - prophylactic dose of Valcyte 450 mg daily  - will need to discuss with ID re: remaining on valcyte ppx    Recommendations incomplete until finalized by attending signature/attestation to note.    Gallo Rodriguez, PGY-4  GI/Hepatology Fellow    MONDAY-FRIDAY 8AM-5PM:  Pager# [phone] ([facility]) or [phone] ([facility])    NON-URGENT CONSULTS:  Please email [email or web address]  AT NIGHT AND ON WEEKENDS:  Contact on-call GI fellow via answering service ([phone]) from 5pm-8am and on weekends/holidays

## 2022-06-18 NOTE — DIETITIAN INITIAL EVALUATION ADULT - PERTINENT LABORATORY DATA
06-18    139  |  110<H>  |  37<H>  ----------------------------<  288<H>  4.2   |  19<L>  |  1.92<H>    Ca    8.8      18 Jun 2022 07:12  Phos  2.7     06-18  Mg     1.8     06-18    TPro  6.3  /  Alb  3.5  /  TBili  0.2  /  DBili  x   /  AST  24  /  ALT  71<H>  /  AlkPhos  109  06-18 06-18 @ 07:12: Na 139, BUN 37<H>, Cr 1.92<H>, <H>, K+ 4.2, Phos 2.7, Mg 1.8, Alk Phos 109, ALT/SGPT 71<H>, AST/SGOT 24, HbA1c --    POCT Blood Glucose.: 291 mg/dL (06-18-22 @ 12:11)  POCT Blood Glucose.: 263 mg/dL (06-18-22 @ 08:15)  POCT Blood Glucose.: 229 mg/dL (06-17-22 @ 21:24)  POCT Blood Glucose.: 195 mg/dL (06-17-22 @ 18:28)    A1C with Estimated Average Glucose Result: 7.8 % (06-18-22 @ 07:12)  A1C with Estimated Average Glucose Result: 10.1 % (12-21-21 @ 16:29)

## 2022-06-18 NOTE — DISCHARGE NOTE PROVIDER - HOSPITAL COURSE
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    s/p OLT 7/2/20 (course c/b VRE bacteremia, L foot OM, CNI toxicity to both Tacrolimus and Cyclosporine; switched to Everolimus 7/27/20    Admitted from 12/20/21 to 1/6/22 s/p fall at home, no LOC or injuries.  - Found to be +Covid w/ CT chest findings concerning for beginnings of multifocal PNA. Received remdesivir and mononclonal antibodies and on dexamethasone (12/29-1/7).   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 PCP prophylaxis changed from bactrim to atovaquone for LFTs.   -Patient was found to have DVT on L femoral vein and started on full treatment AC, now on Eliquis 5mg BID.     D/C summary:  Was evaluated daily by our multidisciplinary transplant team of surgeons, nephrologists, pharmacists, ACPs, RNs, Nutrition and deemed safe for d/c home with the following plan:  -FK----, MMF 1g BID, Pred taper  -standard PPx Fluc/Bactrim/Valcyte------  -f/u with Dr. rodriguez--------           66M with h/o IDDM, HLD, obesity, HFpEF with mild LV diastolic dysfunction, MGUS, AOCD, with a history of duodenal ulcer as well as GAVE and duodenal AVM s/p APC (last on 10/11/19), decompensated EJAN/Cirrhosis (ascites/SBP/HE). 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    s/p OLT 7/2/20 (course c/b VRE bacteremia, L foot OM, CNI toxicity to both Tacrolimus and Cyclosporine; switched to Everolimus 7/27/20    Admitted from 12/20/21 to 1/6/22 s/p fall at home, no LOC or injuries.  - Found to be +Covid w/ CT chest findings concerning for beginnings of multifocal PNA. Received remdesivir and mononclonal antibodies and on dexamethasone (12/29-1/7).   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 PCP prophylaxis changed from bactrim to atovaquone for LFTs.   -Patient was found to have DVT on L femoral vein and started on full treatment AC, now on Eliquis 5mg BID.     Transferred to [facility] from [facility]"" on 6/16/2022 where he was admitted for multiple falls over 24hr period.  +HA, +neck pain, +L rib pain.  Hospital course:   h/o Falls: work up unrevealing for cause of falls  MRI of cervical spine showed moderate to severe spinal stenosis; followed by Neuro, no intervention.   L rib fractures - pain improved with lidocaine patches  MISA/Hydronephrosis - initial imaging with left ureteral stone; likely passed (f/u imaging with no stone)     He was evaluated daily by our multidisciplinary transplant team of surgeons, nephrologists, pharmacists, ACPs, RNs, Nutrition and deemed safe for discharge to rehab with the following plan:  - Immuno:   - PPX:   - Follow up              66M with h/o IDDM, HLD, obesity, HFpEF with mild LV diastolic dysfunction, MGUS, AOCD, with a history of duodenal ulcer as well as GAVE and duodenal AVM s/p APC (last on 10/11/19), 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    s/p OLT 7/2/20 (course c/b VRE bacteremia, L foot OM, CNI toxicity to both Tacrolimus and Cyclosporine; switched to Everolimus 7/27/20    Admitted from 12/20/21 to 1/6/22 s/p fall at home, no LOC or injuries.  - Found to be +Covid w/ CT chest findings concerning for beginnings of multifocal PNA. Received remdesivir and mononclonal antibodies and on dexamethasone (12/29-1/7).   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 PCP prophylaxis changed from bactrim to atovaquone for LFTs.   -Patient was found to have DVT on L femoral vein and started on full treatment AC, now on Eliquis 5mg BID.     Transferred to [facility] from [facility] on 6/16/2022 where he was admitted for multiple falls over 24hr period.  +HA, +neck pain, +L rib pain.  Hospital course:   - h/o Falls: work up unrevealing for cause of falls  - MRI of cervical spine showed moderate to severe spinal stenosis; followed by Neuro, no intervention.   - L rib fractures - pain improved with lidocaine patches  - MISA/Hydronephrosis - initial imaging with left ureteral stone; likely passed (f/u imaging with no stone)     He was evaluated daily by our multidisciplinary transplant team of surgeons, nephrologists, pharmacists, ACPs, RNs, Nutrition and deemed safe for discharge to rehab with the following plan:  - Immuno:   - PPX:   - Follow up              66M with h/o IDDM, HLD, obesity, HFpEF with mild LV diastolic dysfunction, MGUS, AOCD, with a history of duodenal ulcer as well as GAVE and duodenal AVM s/p APC (last on 10/11/19), 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    s/p OLT 7/2/20 (course c/b VRE bacteremia, L foot OM, CNI toxicity to both Tacrolimus and Cyclosporine; switched to Everolimus 7/27/20    Admitted from 12/20/21 to 1/6/22 s/p fall at home, no LOC or injuries.  - Found to be +Covid w/ CT chest findings concerning for beginnings of multifocal PNA. Received remdesivir and mononclonal antibodies and on dexamethasone (12/29-1/7).   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 PCP prophylaxis changed from bactrim to atovaquone for LFTs.   -Patient was found to have DVT on L femoral vein and started on full treatment AC, now on Eliquis 5mg BID.     Transferred to [facility] from [facility] on 6/16/2022 where he was admitted for multiple falls over 24hr period.  +HA, +neck pain, +L rib pain.  Hospital course:   - h/o Falls: work up unrevealing for cause of falls  - MRI of cervical spine showed moderate to severe spinal stenosis; followed by Neuro, no intervention.  Remainder of work-up to be completed as outpatient  - L rib fractures - pain improved with lidocaine patches  - MISA/Hydronephrosis - initial imaging with left ureteral stone; likely passed (f/u imaging with no stone)     He was evaluated daily by our multidisciplinary transplant team of surgeons, nephrologists, pharmacists, ACPs, RNs, Nutrition and deemed safe for discharge to rehab with the following plan:  - Immuno:  Everolimus 4mg daily in AM, Everolimus 3mg daily in PM. MMF 250mg Po BID  - PPX: protonix qd    - Follow up in clinic for lab work and appointment on XXXXX  - Neurology follow-up: Dr. Viera: [id number] [phone]) for EMG/NCS within 1-2 weeks after discharge  - Neurosurgery Follow-up: Dr. Aguilera 1-2weeks after discharge             66M with h/o IDDM, HLD, obesity, HFpEF with mild LV diastolic dysfunction, MGUS, AOCD, with a history of duodenal ulcer as well as GAVE and duodenal AVM s/p APC (last on 10/11/19), 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    s/p OLT 7/2/20 (course c/b VRE bacteremia, L foot OM, CNI toxicity to both Tacrolimus and Cyclosporine; switched to Everolimus 7/27/20    Admitted from 12/20/21 to 1/6/22 s/p fall at home, no LOC or injuries.  - Found to be +Covid w/ CT chest findings concerning for beginnings of multifocal PNA. Received remdesivir and mononclonal antibodies and on dexamethasone (12/29-1/7).   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 PCP prophylaxis changed from bactrim to atovaquone for LFTs.   -Patient was found to have DVT on L femoral vein and started on full treatment AC, now on Eliquis 5mg BID.     Transferred to [facility] from [facility] on 6/16/2022 where he was admitted for multiple falls over 24hr period.  +HA, +neck pain, +L rib pain.  Hospital course:   - h/o Falls: work up unrevealing for cause of falls  - MRI of cervical spine showed moderate to severe spinal stenosis; followed by Neuro, no intervention.  Remainder of work-up to be completed as outpatient  - L rib fractures - pain improved with lidocaine patches  - MISA/Hydronephrosis - initial imaging with left ureteral stone; likely passed (f/u imaging with no stone)     He was evaluated daily by our multidisciplinary transplant team of surgeons, nephrologists, pharmacists, ACPs, RNs, Nutrition and deemed safe for discharge to rehab with the following plan:  - Immuno:  Everolimus 3mg BID, MMF 250mg Po BID. No steroids  - PPX: protonix qd    - Follow up in transplant clinic for lab work and appointment in 2 weeks  - Neurology follow-up: Dr. Viera: [id number] [phone]) for EMG/NCS within 1-2 weeks after discharge  - Neurosurgery Follow-up: Dr. Aguilera 1-2weeks after discharge

## 2022-06-18 NOTE — DISCHARGE NOTE PROVIDER - PROVIDER TOKENS
PROVIDER:[TOKEN:[[id number]]],PROVIDER:[TOKEN:[[id number]]] PROVIDER:[TOKEN:[[id number]]],PROVIDER:[TOKEN:[[id number]]],PROVIDER:[TOKEN:[[id number]]],PROVIDER:[TOKEN:[[id number]]]

## 2022-06-18 NOTE — PROGRESS NOTE ADULT - SUBJECTIVE AND OBJECTIVE BOX
Interval Events:   No acute events overnight.  Afebrile.  Renal US performed yesterday.    ROS:   12 point review of systems performed and negative except otherwise noted in HPI.    Hospital Medications:  atorvastatin 80 milliGRAM(s) Oral at bedtime  dextrose 5%. 1000 milliLiter(s) IV Continuous <Continuous>  dextrose 5%. 1000 milliLiter(s) IV Continuous <Continuous>  dextrose 50% Injectable 25 Gram(s) IV Push once  dextrose 50% Injectable 12.5 Gram(s) IV Push once  dextrose 50% Injectable 25 Gram(s) IV Push once  dextrose Oral Gel 15 Gram(s) Oral once PRN  everolimus (ZORTRESS) 4 milliGRAM(s) Oral <User Schedule>  folic acid 1 milliGRAM(s) Oral daily  glucagon  Injectable 1 milliGRAM(s) IntraMuscular once  heparin   Injectable 5000 Unit(s) SubCutaneous every 12 hours  insulin glargine Injectable (LANTUS) 8 Unit(s) SubCutaneous at bedtime  insulin lispro (ADMELOG) corrective regimen sliding scale   SubCutaneous three times a day before meals  insulin lispro (ADMELOG) corrective regimen sliding scale   SubCutaneous at bedtime  insulin lispro Injectable (ADMELOG) 6 Unit(s) SubCutaneous three times a day before meals  lidocaine   4% Patch 1 Patch Transdermal daily  lidocaine   4% Patch 1 Patch Transdermal daily  mycophenolate mofetil 250 milliGRAM(s) Oral two times a day  omega-3-Acid Ethyl Esters 1 Gram(s) Oral two times a day  pantoprazole    Tablet 40 milliGRAM(s) Oral before breakfast  predniSONE   Tablet 5 milliGRAM(s) Oral daily  sertraline 100 milliGRAM(s) Oral daily  sodium chloride 0.9%. 1000 milliLiter(s) IV Continuous <Continuous>  tamsulosin 0.4 milliGRAM(s) Oral at bedtime  traMADol 25 milliGRAM(s) Oral every 12 hours PRN      PHYSICAL EXAM:   Vital Signs:  Vital Signs Last 24 Hrs  T(C): 36.9 (2022 06:09), Max: 37.1 (2022 12:18)  T(F): 98.4 (2022 06:09), Max: 98.7 (2022 12:18)  HR: 79 (2022 06:09) (74 - 79)  BP: 136/72 (2022 06:09) (136/72 - 172/78)  BP(mean): --  RR: 18 (2022 06:09) (16 - 18)  SpO2: 96% (2022 06:09) (93% - 96%)  Daily     Daily Weight in k.5 (2022 21:06)    GENERAL: no acute distress  NEURO: alert  HEENT: NCAT, no conjunctival pallor appreciated  CHEST: no respiratory distress, no accessory muscle use  CARDIAC: regular rate, +S1/S2  ABDOMEN: soft, nontender, no rebound or guarding  EXTREMITIES: warm, well perfused  SKIN: no lesions noted    LABS: reviewed                        9.2    3.20  )-----------( 137      ( 2022 07:12 )             27.8     06-18    139  |  110<H>  |  37<H>  ----------------------------<  288<H>  4.2   |  19<L>  |  1.92<H>    Ca    8.8      2022 07:12  Phos  2.7     06-18  Mg     1.8     18    TPro  6.3  /  Alb  3.5  /  TBili  0.2  /  DBili  x   /  AST  24  /  ALT  71<H>  /  AlkPhos  109  -18    LIVER FUNCTIONS - ( 2022 07:12 )  Alb: 3.5 g/dL / Pro: 6.3 g/dL / ALK PHOS: 109 U/L / ALT: 71 U/L / AST: 24 U/L / GGT: x             Interval Diagnostic Studies: see sunrise for full report

## 2022-06-18 NOTE — PROGRESS NOTE ADULT - SUBJECTIVE AND OBJECTIVE BOX
Transplant Surgery - Multidisciplinary Rounds  --------------------------------------------------------------  OLT 7/2/2020    Present:   Patient seen and examined with multidisciplinary Transplant team including  Surgeon: Dr. Ackerman, Hepatologist: Dr. Rose, NP Patrick  unit RNs during AM rounds.   Disciplines not in attendance will be notified of the plan.     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    s/p OLT 7/2/20 (course c/b VRE bacteremia, L foot OM, CNI toxicity to both Tacrolimus and Cyclosporine; switched to Everolimus 7/27/20    Admitted from 12/20/21 to 1/6/22 s/p fall at home, no LOC or injuries.  - Found to be +Covid w/ CT chest findings concerning for beginnings of multifocal PNA. Received remdesivir and mononclonal antibodies and on dexamethasone (12/29-1/7).   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 PCP prophylaxis changed from bactrim to atovaquone for LFTs.   -Patient was found to have DVT on L femoral vein and started on full treatment AC, now on Eliquis 5mg BID.     ------------  Transferred to [facility] from [facility] on 6/16/2022 where he was admitted for multiple falls over 24hr period.  +HA, +neck pain, +L rib pain.  Imaging revealed:  -C5-C7 mod-severe spinal stenosis  -L rib fx  -5mm distal L ureteral stone  -diarrhea    Interval Events:  -afebrile, VSS  -diarrhea has resolved  -c/o tooth discomfort (has cap) limiting PO  -baseline independent post-txp, occasionally requiring walker.  in-house, pt with continued b/l LE weakness limiting mobility  -Seen by Neuro rec MRI to r/o stenosis MRI done  -neck and L rib pain, somewhat controlled  -tolerating PO  -UO 1L without issues, fran      Imunnosupression:  Everolimus 4mg BID fu daily level, MMF 250mg bid (due to GI symptoms), pred 5QD  Potential Discharge date: pending clinical stability  Education:  Medications  Plan of care:  See Below       MEDICATIONS  (STANDING):  atorvastatin 80 milliGRAM(s) Oral at bedtime  dextrose 5%. 1000 milliLiter(s) (100 mL/Hr) IV Continuous <Continuous>  dextrose 5%. 1000 milliLiter(s) (50 mL/Hr) IV Continuous <Continuous>  dextrose 50% Injectable 25 Gram(s) IV Push once  dextrose 50% Injectable 12.5 Gram(s) IV Push once  dextrose 50% Injectable 25 Gram(s) IV Push once  everolimus (ZORTRESS) 4 milliGRAM(s) Oral <User Schedule>  folic acid 1 milliGRAM(s) Oral daily  glucagon  Injectable 1 milliGRAM(s) IntraMuscular once  heparin   Injectable 5000 Unit(s) SubCutaneous every 12 hours  insulin glargine Injectable (LANTUS) 8 Unit(s) SubCutaneous at bedtime  insulin lispro (ADMELOG) corrective regimen sliding scale   SubCutaneous three times a day before meals  insulin lispro (ADMELOG) corrective regimen sliding scale   SubCutaneous at bedtime  insulin lispro Injectable (ADMELOG) 6 Unit(s) SubCutaneous three times a day before meals  lidocaine   4% Patch 1 Patch Transdermal daily  lidocaine   4% Patch 1 Patch Transdermal daily  mycophenolate mofetil 250 milliGRAM(s) Oral two times a day  omega-3-Acid Ethyl Esters 1 Gram(s) Oral two times a day  pantoprazole    Tablet 40 milliGRAM(s) Oral before breakfast  polyethylene glycol 3350 17 Gram(s) Oral once  predniSONE   Tablet 5 milliGRAM(s) Oral daily  sertraline 100 milliGRAM(s) Oral daily  sodium chloride 0.9%. 1000 milliLiter(s) (50 mL/Hr) IV Continuous <Continuous>  tamsulosin 0.4 milliGRAM(s) Oral at bedtime    MEDICATIONS  (PRN):  dextrose Oral Gel 15 Gram(s) Oral once PRN Blood Glucose LESS THAN 70 milliGRAM(s)/deciliter  traMADol 25 milliGRAM(s) Oral every 12 hours PRN Severe Pain (7 - 10)      PAST MEDICAL & SURGICAL HISTORY:  Diabetes  Hypercholesteremia  Neuropathy  Hypertension  Renal stones 25 years ago  Fatty liver disease, nonalcoholic  Obesity  Hepatic encephalopathy  GERD with esophagitis Gastritis &amp; Non Bleeding Ulcers  GIB (gastrointestinal bleeding)  S/P cholecystectomy      Vital Signs Last 24 Hrs  T(C): 36.9 (18 Jun 2022 06:09), Max: 37.1 (17 Jun 2022 12:18)  T(F): 98.4 (18 Jun 2022 06:09), Max: 98.7 (17 Jun 2022 12:18)  HR: 79 (18 Jun 2022 06:09) (74 - 79)  BP: 136/72 (18 Jun 2022 06:09) (136/72 - 172/78)  BP(mean): --  RR: 18 (18 Jun 2022 06:09) (16 - 18)  SpO2: 96% (18 Jun 2022 06:09) (93% - 96%)    I&O's Summary    17 Jun 2022 07:01  -  18 Jun 2022 07:00  --------------------------------------------------------  IN: 850 mL / OUT: 1050 mL / NET: -200 mL    18 Jun 2022 07:01  -  18 Jun 2022 11:55  --------------------------------------------------------  IN: 480 mL / OUT: 500 mL / NET: -20 mL                        9.2    3.20  )-----------( 137      ( 18 Jun 2022 07:12 )             27.8     06-18    139  |  110<H>  |  37<H>  ----------------------------<  288<H>  4.2   |  19<L>  |  1.92<H>    Ca    8.8      18 Jun 2022 07:12  Phos  2.7     06-18  Mg     1.8     06-18    TPro  6.3  /  Alb  3.5  /  TBili  0.2  /  DBili  x   /  AST  24  /  ALT  71<H>  /  AlkPhos  109  06-18      Culture - Blood (collected 06-16-22 @ 17:21)  Source: .Blood Blood  Preliminary Report (06-17-22 @ 23:02):    No growth to date.    Culture - Blood (collected 06-16-22 @ 17:21)  Source: .Blood Blood  Preliminary Report (06-17-22 @ 23:02):    No growth to date.    Culture - Urine (collected 06-16-22 @ 10:07)  Source: Clean Catch Clean Catch (Midstream)  Final Report (06-17-22 @ 14:27):    <10,000 CFU/mL Normal Urogenital Shantel    Culture - Urine (collected 06-16-22 @ 00:52)  Source: Clean Catch Clean Catch (Midstream)  Final Report (06-16-22 @ 23:14):    <10,000 CFU/mL Normal Urogenital Shantel        Review of systems  Gen: No weight changes, fatigue, fevers/chills, see hpi  Skin: No rashes  Head/Eyes/Ears/Mouth: No headache; Normal hearing; Normal vision w/o blurriness; No sinus pain/discomfort, sore throat  Respiratory: No dyspnea, cough, wheezing, hemoptysis  CV: No chest pain, PND, orthopnea  GI:  no diarrhea, constipation, nausea, vomiting, melena, hematochezia  : No increased frequency, dysuria, hematuria, nocturia  MSK: see hpi, no edema  Neuro: No dizziness/lightheadedness, weakness, seizures, numbness, tingling  Heme: No easy bruising or bleeding  Endo: No heat/cold intolerance  Psych: No significant nervousness, anxiety, stress, depression  All other systems were reviewed and are negative, except as noted.      PHYSICAL EXAM:  Constitutional: Well developed / well nourished  Eyes: anicteric, PERRLA  ENMT: nc/at, no thrush, loose front tooth  Neck: supple  Respiratory: CTA B/L  Cardiovascular: RRR.  Gastrointestinal: Soft abdomen, ND/NT. well healed incisional scar  Genitourinary: Voiding spontaneously  Extremities: SCD's in place and working bilaterally, no calf TTP. no edema. L rib pain to palpation  Vascular: Palpable dp pulses bilaterally.   Neurological: A&O x3  Skin: no rashes, ulcerations, lesions  Musculoskeletal: Moving all extremities, decreased strength throughout  Psychiatric: Responsive

## 2022-06-18 NOTE — CONSULT NOTE ADULT - SUBJECTIVE AND OBJECTIVE BOX
p ([id number])    66M adm transplant medicine, [facility] hx. Presenting w/ fall and ureteral stone c/b hydronephrosis. Endorses neck pain with movement. Nsgy consulted for MRI findings. MRI rec'd by neurology b/c patient having inc # of falls. CT C-spine just showing degen changes. CTH neg. MR C: multilevel spondylosis w/ canal/foraminal narrowing, worst at C5-7. Exam: AOx3, FC, PERRL, EOMI, no facial, 5/5 throughout, no drift, no haddad, no clonus.    --Anticoagulation:  heparin   Injectable 5000 Unit(s) SubCutaneous every 12 hours    =====================  PAST MEDICAL HISTORY   Diabetes    Hypercholesteremia    Neuropathy    Hypertension    Renal stones    Fatty liver disease, nonalcoholic    Obesity    Hepatic encephalopathy    GERD with esophagitis    GIB (gastrointestinal bleeding)      PAST SURGICAL HISTORY   No significant past surgical history    S/P cholecystectomy      codeine (Anaphylaxis)      MEDICATIONS:  Antibiotics:    Neuro:  sertraline 100 milliGRAM(s) Oral daily  traMADol 25 milliGRAM(s) Oral every 12 hours PRN    Other:  atorvastatin 80 milliGRAM(s) Oral at bedtime  dextrose 5%. 1000 milliLiter(s) IV Continuous <Continuous>  dextrose 5%. 1000 milliLiter(s) IV Continuous <Continuous>  dextrose 50% Injectable 25 Gram(s) IV Push once  dextrose 50% Injectable 12.5 Gram(s) IV Push once  dextrose 50% Injectable 25 Gram(s) IV Push once  dextrose Oral Gel 15 Gram(s) Oral once PRN  everolimus (ZORTRESS) 4 milliGRAM(s) Oral <User Schedule>  folic acid 1 milliGRAM(s) Oral daily  glucagon  Injectable 1 milliGRAM(s) IntraMuscular once  insulin glargine Injectable (LANTUS) 8 Unit(s) SubCutaneous at bedtime  insulin lispro (ADMELOG) corrective regimen sliding scale   SubCutaneous three times a day before meals  insulin lispro (ADMELOG) corrective regimen sliding scale   SubCutaneous at bedtime  insulin lispro Injectable (ADMELOG) 6 Unit(s) SubCutaneous three times a day before meals  mycophenolate mofetil 250 milliGRAM(s) Oral two times a day  pantoprazole    Tablet 40 milliGRAM(s) Oral before breakfast  predniSONE   Tablet 5 milliGRAM(s) Oral daily  sodium chloride 0.9%. 1000 milliLiter(s) IV Continuous <Continuous>  tamsulosin 0.4 milliGRAM(s) Oral at bedtime      SOCIAL HISTORY:   Occupation:   Marital Status:     FAMILY HISTORY:  No pertinent family history in first degree relatives    Family history of stomach cancer    Family history of hypertension    Family history of type 2 diabetes mellitus        ROS: Negative except per HPI    LABS:                          9.2    3.20  )-----------( 137      ( 18 Jun 2022 07:12 )             27.8     06-18    139  |  110<H>  |  37<H>  ----------------------------<  288<H>  4.2   |  19<L>  |  1.92<H>    Ca    8.8      18 Jun 2022 07:12  Phos  2.7     06-18  Mg     1.8     06-18    TPro  6.3  /  Alb  3.5  /  TBili  0.2  /  DBili  x   /  AST  24  /  ALT  71<H>  /  AlkPhos  109  06-18      e

## 2022-06-18 NOTE — DIETITIAN INITIAL EVALUATION ADULT - OTHER CALCULATIONS
Dosing wt 232.5 pounds used for calorie anf fluid needs calculation; IBW+10% (BMI 30 ) 202 pounds for protein needs calculations.

## 2022-06-18 NOTE — CONSULT NOTE ADULT - SUBJECTIVE AND OBJECTIVE BOX
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   As per pt he had 3 falls in 24hrs, he was on the floor for 3 hours after last fall and crawled to the phone to call for help. He is c/o head,neck,left side pain, and mild nausea on way to ED. On Ct he was found to have a 5 mm distal left ureteral calculus causing hydroureteronephrosis.     Of note he was admitted from 12/20/21 to 1/6/22 s/p fall at home, no LOC or injuries. Found to be +Covid w/ CT chest findings concerning for beginnings of multifocal PNA. Received remdesivir and mononclonal antibodies and on dexamethasone (12/29-1/7). 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 PCP prophylaxis changed from bactrim to atovaquone for LFTs. Endocrine consulted for steroid induced hyperglycemia. Patient was found to have DVT on L femoral vein and started on full treatment AC, now on Eliquis 5mg BID.      INCOMPLETE       PAST MEDICAL & SURGICAL HISTORY:  Diabetes    Hypercholesteremia    Neuropathy    Hypertension    Renal stones  25 years ago    Fatty liver disease, nonalcoholic    Obesity    Hepatic encephalopathy    GERD with esophagitis  Gastritis &amp; Non Bleeding Ulcers    GIB (gastrointestinal bleeding)    S/P cholecystectomy    FAMILY HISTORY:  Family history of stomach cancer    Family history of hypertension    Family history of type 2 diabetes mellitus    SOCIAL HISTORY:    MEDICATIONS (HOME):  Home Medications:  aspirin 81 mg oral delayed release tablet: 1 tab(s) orally once a day (20 Dec 2021 13:25)  everolimus 1 mg oral tablet: 3 tab(s) orally 2 times a day (06 Jan 2022 14:46)  ezetimibe 10 mg oral tablet: 1 tab(s) orally once a day (06 Jan 2022 14:43)  folic acid 1 mg oral tablet: 1 tab(s) orally once a day (20 Dec 2021 13:25)  insulin glargine: 46 unit(s) subcutaneous once a day (at bedtime) (06 Jan 2022 14:39)  insulin lispro 100 units/mL injectable solution: 29 unit(s) injectable 3 times a day before meals  (06 Jan 2022 14:39)  Lovaza 1000 mg oral capsule: 2 cap(s) orally 2 times a day (20 Dec 2021 13:25)  mycophenolate mofetil 250 mg oral capsule: 500 milligram(s) orally 2 times a day (06 Jan 2022 14:46)  pantoprazole 40 mg oral delayed release tablet: 1 tab(s) orally 2 times a day (06 Jan 2022 14:46)  rosuvastatin 20 mg oral tablet: 1 tab(s) orally once a day (06 Jan 2022 14:43)  Zoloft 100 mg oral tablet: 1 tab(s) orally once a day (20 Dec 2021 13:25)    MEDICATIONS  (STANDING):  atorvastatin 80 milliGRAM(s) Oral at bedtime  dextrose 5%. 1000 milliLiter(s) (100 mL/Hr) IV Continuous <Continuous>  dextrose 5%. 1000 milliLiter(s) (50 mL/Hr) IV Continuous <Continuous>  dextrose 50% Injectable 25 Gram(s) IV Push once  dextrose 50% Injectable 12.5 Gram(s) IV Push once  dextrose 50% Injectable 25 Gram(s) IV Push once  everolimus (ZORTRESS) 4 milliGRAM(s) Oral <User Schedule>  folic acid 1 milliGRAM(s) Oral daily  glucagon  Injectable 1 milliGRAM(s) IntraMuscular once  heparin   Injectable 5000 Unit(s) SubCutaneous every 12 hours  insulin glargine Injectable (LANTUS) 8 Unit(s) SubCutaneous at bedtime  insulin lispro (ADMELOG) corrective regimen sliding scale   SubCutaneous three times a day before meals  insulin lispro (ADMELOG) corrective regimen sliding scale   SubCutaneous at bedtime  insulin lispro Injectable (ADMELOG) 6 Unit(s) SubCutaneous three times a day before meals  lidocaine   4% Patch 1 Patch Transdermal daily  lidocaine   4% Patch 1 Patch Transdermal daily  mycophenolate mofetil 250 milliGRAM(s) Oral two times a day  omega-3-Acid Ethyl Esters 1 Gram(s) Oral two times a day  pantoprazole    Tablet 40 milliGRAM(s) Oral before breakfast  predniSONE   Tablet 5 milliGRAM(s) Oral daily  sertraline 100 milliGRAM(s) Oral daily  sodium chloride 0.9%. 1000 milliLiter(s) (50 mL/Hr) IV Continuous <Continuous>  tamsulosin 0.4 milliGRAM(s) Oral at bedtime    MEDICATIONS  (PRN):  dextrose Oral Gel 15 Gram(s) Oral once PRN Blood Glucose LESS THAN 70 milliGRAM(s)/deciliter  traMADol 25 milliGRAM(s) Oral every 12 hours PRN Severe Pain (7 - 10)    ALLERGIES/INTOLERANCES:  Allergies  codeine (Anaphylaxis)    Intolerances    VITALS & EXAMINATION:  Vital Signs Last 24 Hrs  T(C): 36.9 (18 Jun 2022 12:39), Max: 36.9 (18 Jun 2022 06:09)  T(F): 98.4 (18 Jun 2022 12:39), Max: 98.4 (18 Jun 2022 06:09)  HR: 75 (18 Jun 2022 12:39) (75 - 79)  BP: 169/83 (18 Jun 2022 12:39) (136/72 - 169/83)  BP(mean): --  RR: 18 (18 Jun 2022 12:39) (16 - 18)  SpO2: 93% (18 Jun 2022 12:39) (93% - 96%)       LABORATORY:  CBC                       9.2    3.20  )-----------( 137      ( 18 Jun 2022 07:12 )             27.8     Chem 06-18    139  |  110<H>  |  37<H>  ----------------------------<  288<H>  4.2   |  19<L>  |  1.92<H>    Ca    8.8      18 Jun 2022 07:12  Phos  2.7     06-18  Mg     1.8     06-18    TPro  6.3  /  Alb  3.5  /  TBili  0.2  /  DBili  x   /  AST  24  /  ALT  71<H>  /  AlkPhos  109  06-18    LFTs LIVER FUNCTIONS - ( 18 Jun 2022 07:12 )  Alb: 3.5 g/dL / Pro: 6.3 g/dL / ALK PHOS: 109 U/L / ALT: 71 U/L / AST: 24 U/L / GGT: x           Coagulopathy   Lipid Panel   A1c   Cardiac enzymes     U/A   CSF  Other    STUDIES & IMAGING: (EEG, CT, MR, U/S, TTE/DUSTIN):    < from: MR Cervical Spine No Cont (06.17.22 @ 18:27) >    ACC: [id number] EXAM:  MR SPINE CERVICAL                          PROCEDURE DATE:  06/17/2022          INTERPRETATION:  CLINICAL INFORMATION: Neck pain status post fall.   Moderate-severe stenosis.    TECHNIQUE: Multiplanar multisequence MR of the cervical spine was   acquired.    COMPARISON: There are no prior MRI studies available for comparison.   Comparison is made to the most recent prior CT examination of the   cervical spine from 6/15/2022.    FINDINGS: No acute fractures or dislocations are seen. Otherwise, the   cervical alignment is maintained. There is no loss of vertebral body   height. There is no aggressive marrow signal change. Cord signal is   normal. Nonspecific fluid is seen between the C1-C2 articulation.    Level by level evaluation reveals:    At C2-C3: There is disc space desiccation with minor circumferential loss   of height. A shallow bulging disc is seen which mildly deforms the   ventral thecal sac. Facet and uncinate hypertrophy are noted bilaterally.   There is no spinal canal or foraminal stenosis.    At C3-C4: There is disc space desiccation with mild circumferential loss   of height. A posterior disc osteophyte complex deforms the ventral thecal   sac and nearly contacts the ventral surface of the cord.Bilateral facet   and uncinate hypertrophy are noted which contribute to severe left and   moderate right foraminal narrowing. There is overall minimal canal   stenosis.    At C4-C5: There is disc space desiccation with mild circumferential loss   ofheight. Shallow posterior disc osteophyte complex deforms the ventral   thecal sac. Bilateral facet and uncinate hypertrophy are noted which   contribute to mild left but no right foraminal narrowing. There is no   canal stenosis.    At C5-C6: There is diffuse disc degeneration with loss of height and   endplate osteophyte formation. A posterior disc osteophyte complex   deforms the ventral thecal sac and nearly contacts the ventral surface of   the cord. Bilateral facet and uncinate hypertrophy are noted which   contribute to severe left and mild right foraminal narrowing. There is   overall mild canal stenosis.    At C6-C7: There is diffuse disc degeneration with loss of height and   endplate osteophyte formation. A posterior disc osteophyte complex more   asymmetric towards the right lateral recess is seen which deforms the   ventral thecal sac and contacts the ventral surface of the cord with cord   flattening. No cord signal abnormality is seen. Bilateral facet and   uncinate hypertrophy are noted which contribute to severe right and   moderate left foraminal stenosis. There is overall moderate-severe canal   stenosis as well.    At C7-T1: Unremarkable.    Bilateral mastoid air cell effusions are partially visualized.    IMPRESSION: Multilevel cervical spondylosis with variable degrees of   canal and foraminal narrowing, as outlined. Findings are worst at the   C6-C7 level.    --- End of Report ---    MARTIN MURDOCK MD; Attending Radiologist  This document has been electronically signed. 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 States he has had increase in falls over few months but particularly increased since friday. Had 3 falls since last friday all with his L leg giving out although he notes his L leg is stronger than other. He fell off chair on tuesday similarly and also tripped in bathroom. Has head, neck, left side pain. During events denied headaches, vision changes, dizziness, slurred speech, facial droop, new focal weakness/ numbness worse than baseline.      PAST MEDICAL & SURGICAL HISTORY:  Diabetes    Hypercholesteremia    Neuropathy    Hypertension    Renal stones  25 years ago    Fatty liver disease, nonalcoholic    Obesity    Hepatic encephalopathy    GERD with esophagitis  Gastritis &amp; Non Bleeding Ulcers    GIB (gastrointestinal bleeding)    S/P cholecystectomy    FAMILY HISTORY:  Family history of stomach cancer    Family history of hypertension    Family history of type 2 diabetes mellitus    SOCIAL HISTORY:    MEDICATIONS (HOME):  Home Medications:  aspirin 81 mg oral delayed release tablet: 1 tab(s) orally once a day (20 Dec 2021 13:25)  everolimus 1 mg oral tablet: 3 tab(s) orally 2 times a day (06 Jan 2022 14:46)  ezetimibe 10 mg oral tablet: 1 tab(s) orally once a day (06 Jan 2022 14:43)  folic acid 1 mg oral tablet: 1 tab(s) orally once a day (20 Dec 2021 13:25)  insulin glargine: 46 unit(s) subcutaneous once a day (at bedtime) (06 Jan 2022 14:39)  insulin lispro 100 units/mL injectable solution: 29 unit(s) injectable 3 times a day before meals  (06 Jan 2022 14:39)  Lovaza 1000 mg oral capsule: 2 cap(s) orally 2 times a day (20 Dec 2021 13:25)  mycophenolate mofetil 250 mg oral capsule: 500 milligram(s) orally 2 times a day (06 Jan 2022 14:46)  pantoprazole 40 mg oral delayed release tablet: 1 tab(s) orally 2 times a day (06 Jan 2022 14:46)  rosuvastatin 20 mg oral tablet: 1 tab(s) orally once a day (06 Jan 2022 14:43)  Zoloft 100 mg oral tablet: 1 tab(s) orally once a day (20 Dec 2021 13:25)    MEDICATIONS  (STANDING):  atorvastatin 80 milliGRAM(s) Oral at bedtime  dextrose 5%. 1000 milliLiter(s) (100 mL/Hr) IV Continuous <Continuous>  dextrose 5%. 1000 milliLiter(s) (50 mL/Hr) IV Continuous <Continuous>  dextrose 50% Injectable 25 Gram(s) IV Push once  dextrose 50% Injectable 12.5 Gram(s) IV Push once  dextrose 50% Injectable 25 Gram(s) IV Push once  everolimus (ZORTRESS) 4 milliGRAM(s) Oral <User Schedule>  folic acid 1 milliGRAM(s) Oral daily  glucagon  Injectable 1 milliGRAM(s) IntraMuscular once  heparin   Injectable 5000 Unit(s) SubCutaneous every 12 hours  insulin glargine Injectable (LANTUS) 8 Unit(s) SubCutaneous at bedtime  insulin lispro (ADMELOG) corrective regimen sliding scale   SubCutaneous three times a day before meals  insulin lispro (ADMELOG) corrective regimen sliding scale   SubCutaneous at bedtime  insulin lispro Injectable (ADMELOG) 6 Unit(s) SubCutaneous three times a day before meals  lidocaine   4% Patch 1 Patch Transdermal daily  lidocaine   4% Patch 1 Patch Transdermal daily  mycophenolate mofetil 250 milliGRAM(s) Oral two times a day  omega-3-Acid Ethyl Esters 1 Gram(s) Oral two times a day  pantoprazole    Tablet 40 milliGRAM(s) Oral before breakfast  predniSONE   Tablet 5 milliGRAM(s) Oral daily  sertraline 100 milliGRAM(s) Oral daily  sodium chloride 0.9%. 1000 milliLiter(s) (50 mL/Hr) IV Continuous <Continuous>  tamsulosin 0.4 milliGRAM(s) Oral at bedtime    MEDICATIONS  (PRN):  dextrose Oral Gel 15 Gram(s) Oral once PRN Blood Glucose LESS THAN 70 milliGRAM(s)/deciliter  traMADol 25 milliGRAM(s) Oral every 12 hours PRN Severe Pain (7 - 10)    ALLERGIES/INTOLERANCES:  Allergies  codeine (Anaphylaxis)    Intolerances    VITALS & EXAMINATION:  Vital Signs Last 24 Hrs  T(C): 36.9 (18 Jun 2022 12:39), Max: 36.9 (18 Jun 2022 06:09)  T(F): 98.4 (18 Jun 2022 12:39), Max: 98.4 (18 Jun 2022 06:09)  HR: 75 (18 Jun 2022 12:39) (75 - 79)  BP: 169/83 (18 Jun 2022 12:39) (136/72 - 169/83)  BP(mean): --  RR: 18 (18 Jun 2022 12:39) (16 - 18)  SpO2: 93% (18 Jun 2022 12:39) (93% - 96%)    General:  Constitutional: Male, appears stated age, in pain thats positional  Head: Normocephalic; Eyes: clear sclera;   Ears: hearing reduced R ear  Extremities: No cyanosis;  Skin: nonpitting edema of LE  Resp: breathing comfortably     Neurological (>12):  MS: Awake, alert.  Follows commands.   Language: Speech is clear, fluent, normal volume, good repetition, comprehension, registration of words.  CNs: PERRL (R 2mm, L 2mm). has difficulty seeing in all quadrants (chronic) even with/without glasses. Can read close up. EOMI no nystagmus. V1-3 intact LT, No facial asymmetry b/l, full. Hearing grossly normal (rubbing fingers) b/l. Tongue midline. Shoulder shrug symmetric b/l    Motor: Normal muscle bulk & tone. Exam limited due to pain from fxr.              Deltoid	Biceps	Triceps	   R	5	5	5	5		 	  L	5	4	5	5		(biceps flex limited from pain of fxr)	  	H-Flex	K-Ext	D-Flex	P-Flex  R	4	5	4+	4+			 	   L	4-	4+	4+	4+		 exam limited from fxr on L side.    Sensation: Intact to LT, vibration, proprioception, temp b/l UE. Decreased LT, proprioception (appreciates at ankles b/l), vibration (mid shin in R and worse in L). Cortical: Extinction on DSS (neglect): none  Reflexes R/L:  Biceps(C5) 3/3  BR(C6) 2/2   Triceps(C7)  2/2 Patellar(L4)   1/1 Ankle 0/0  Toes: downgoing bilaterally  No ankle clonus  Coordination: No dysmetria to FTN b/l UE, cannot assess HTS due to pain  Gait: Cannot get to standing position due to pain     LABORATORY:  CBC                       9.2    3.20  )-----------( 137      ( 18 Jun 2022 07:12 )             27.8     Chem 06-18    139  |  110<H>  |  37<H>  ----------------------------<  288<H>  4.2   |  19<L>  |  1.92<H>    Ca    8.8      18 Jun 2022 07:12  Phos  2.7     06-18  Mg     1.8     06-18    TPro  6.3  /  Alb  3.5  /  TBili  0.2  /  DBili  x   /  AST  24  /  ALT  71<H>  /  AlkPhos  109  06-18    LFTs LIVER FUNCTIONS - ( 18 Jun 2022 07:12 )  Alb: 3.5 g/dL / Pro: 6.3 g/dL / ALK PHOS: 109 U/L / ALT: 71 U/L / AST: 24 U/L / GGT: x           Coagulopathy   Lipid Panel   A1c   Cardiac enzymes     U/A   CSF  Other    STUDIES & IMAGING: (EEG, CT, MR, U/S, TTE/DUSTIN):    < from: MR Cervical Spine No Cont (06.17.22 @ 18:27) >    ACC: [id number] EXAM:  MR SPINE CERVICAL                          PROCEDURE DATE:  06/17/2022          INTERPRETATION:  CLINICAL INFORMATION: Neck pain status post fall.   Moderate-severe stenosis.    TECHNIQUE: Multiplanar multisequence MR of the cervical spine was   acquired.    COMPARISON: There are no prior MRI studies available for comparison.   Comparison is made to the most recent prior CT examination of the   cervical spine from 6/15/2022.    FINDINGS: No acute fractures or dislocations are seen. Otherwise, the   cervical alignment is maintained. There is no loss of vertebral body   height. There is no aggressive marrow signal change. Cord signal is   normal. Nonspecific fluid is seen between the C1-C2 articulation.    Level by level evaluation reveals:    At C2-C3: There is disc space desiccation with minor circumferential loss   of height. A shallow bulging disc is seen which mildly deforms the   ventral thecal sac. Facet and uncinate hypertrophy are noted bilaterally.   There is no spinal canal or foraminal stenosis.    At C3-C4: There is disc space desiccation with mild circumferential loss   of height. A posterior disc osteophyte complex deforms the ventral thecal   sac and nearly contacts the ventral surface of the cord.Bilateral facet   and uncinate hypertrophy are noted which contribute to severe left and   moderate right foraminal narrowing. There is overall minimal canal   stenosis.    At C4-C5: There is disc space desiccation with mild circumferential loss   ofheight. Shallow posterior disc osteophyte complex deforms the ventral   thecal sac. Bilateral facet and uncinate hypertrophy are noted which   contribute to mild left but no right foraminal narrowing. There is no   canal stenosis.    At C5-C6: There is diffuse disc degeneration with loss of height and   endplate osteophyte formation. A posterior disc osteophyte complex   deforms the ventral thecal sac and nearly contacts the ventral surface of   the cord. Bilateral facet and uncinate hypertrophy are noted which   contribute to severe left and mild right foraminal narrowing. There is   overall mild canal stenosis.    At C6-C7: There is diffuse disc degeneration with loss of height and   endplate osteophyte formation. A posterior disc osteophyte complex more   asymmetric towards the right lateral recess is seen which deforms the   ventral thecal sac and contacts the ventral surface of the cord with cord   flattening. No cord signal abnormality is seen. Bilateral facet and   uncinate hypertrophy are noted which contribute to severe right and   moderate left foraminal stenosis. There is overall moderate-severe canal   stenosis as well.    At C7-T1: Unremarkable.    Bilateral mastoid air cell effusions are partially visualized.    IMPRESSION: Multilevel cervical spondylosis with variable degrees of   canal and foraminal narrowing, as outlined. Findings are worst at the   C6-C7 level.    --- End of Report ---    MARTIN MURDOCK MD; Attending Radiologist  This document has been electronically signed. 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 States he has had increase in falls over few months but particularly increased since friday. Had 3 falls since last friday all with his L leg giving out although he notes his L leg is stronger than other. He fell off chair on tuesday similarly and also tripped in bathroom. Has head, neck, left side pain. Does state he has been having chronic neck pain and although strenght appears intact in uppers has been having some diminshment as well. During events denied headaches, vision changes, dizziness, slurred speech, facial droop, new focal weakness/ numbness worse than baseline.      PAST MEDICAL & SURGICAL HISTORY:  Diabetes    Hypercholesteremia    Neuropathy    Hypertension    Renal stones  25 years ago    Fatty liver disease, nonalcoholic    Obesity    Hepatic encephalopathy    GERD with esophagitis  Gastritis &amp; Non Bleeding Ulcers    GIB (gastrointestinal bleeding)    S/P cholecystectomy    FAMILY HISTORY:  Family history of stomach cancer    Family history of hypertension    Family history of type 2 diabetes mellitus    SOCIAL HISTORY:    MEDICATIONS (HOME):  Home Medications:  aspirin 81 mg oral delayed release tablet: 1 tab(s) orally once a day (20 Dec 2021 13:25)  everolimus 1 mg oral tablet: 3 tab(s) orally 2 times a day (06 Jan 2022 14:46)  ezetimibe 10 mg oral tablet: 1 tab(s) orally once a day (06 Jan 2022 14:43)  folic acid 1 mg oral tablet: 1 tab(s) orally once a day (20 Dec 2021 13:25)  insulin glargine: 46 unit(s) subcutaneous once a day (at bedtime) (06 Jan 2022 14:39)  insulin lispro 100 units/mL injectable solution: 29 unit(s) injectable 3 times a day before meals  (06 Jan 2022 14:39)  Lovaza 1000 mg oral capsule: 2 cap(s) orally 2 times a day (20 Dec 2021 13:25)  mycophenolate mofetil 250 mg oral capsule: 500 milligram(s) orally 2 times a day (06 Jan 2022 14:46)  pantoprazole 40 mg oral delayed release tablet: 1 tab(s) orally 2 times a day (06 Jan 2022 14:46)  rosuvastatin 20 mg oral tablet: 1 tab(s) orally once a day (06 Jan 2022 14:43)  Zoloft 100 mg oral tablet: 1 tab(s) orally once a day (20 Dec 2021 13:25)    MEDICATIONS  (STANDING):  atorvastatin 80 milliGRAM(s) Oral at bedtime  dextrose 5%. 1000 milliLiter(s) (100 mL/Hr) IV Continuous <Continuous>  dextrose 5%. 1000 milliLiter(s) (50 mL/Hr) IV Continuous <Continuous>  dextrose 50% Injectable 25 Gram(s) IV Push once  dextrose 50% Injectable 12.5 Gram(s) IV Push once  dextrose 50% Injectable 25 Gram(s) IV Push once  everolimus (ZORTRESS) 4 milliGRAM(s) Oral <User Schedule>  folic acid 1 milliGRAM(s) Oral daily  glucagon  Injectable 1 milliGRAM(s) IntraMuscular once  heparin   Injectable 5000 Unit(s) SubCutaneous every 12 hours  insulin glargine Injectable (LANTUS) 8 Unit(s) SubCutaneous at bedtime  insulin lispro (ADMELOG) corrective regimen sliding scale   SubCutaneous three times a day before meals  insulin lispro (ADMELOG) corrective regimen sliding scale   SubCutaneous at bedtime  insulin lispro Injectable (ADMELOG) 6 Unit(s) SubCutaneous three times a day before meals  lidocaine   4% Patch 1 Patch Transdermal daily  lidocaine   4% Patch 1 Patch Transdermal daily  mycophenolate mofetil 250 milliGRAM(s) Oral two times a day  omega-3-Acid Ethyl Esters 1 Gram(s) Oral two times a day  pantoprazole    Tablet 40 milliGRAM(s) Oral before breakfast  predniSONE   Tablet 5 milliGRAM(s) Oral daily  sertraline 100 milliGRAM(s) Oral daily  sodium chloride 0.9%. 1000 milliLiter(s) (50 mL/Hr) IV Continuous <Continuous>  tamsulosin 0.4 milliGRAM(s) Oral at bedtime    MEDICATIONS  (PRN):  dextrose Oral Gel 15 Gram(s) Oral once PRN Blood Glucose LESS THAN 70 milliGRAM(s)/deciliter  traMADol 25 milliGRAM(s) Oral every 12 hours PRN Severe Pain (7 - 10)    ALLERGIES/INTOLERANCES:  Allergies  codeine (Anaphylaxis)    Intolerances    VITALS & EXAMINATION:  Vital Signs Last 24 Hrs  T(C): 36.9 (18 Jun 2022 12:39), Max: 36.9 (18 Jun 2022 06:09)  T(F): 98.4 (18 Jun 2022 12:39), Max: 98.4 (18 Jun 2022 06:09)  HR: 75 (18 Jun 2022 12:39) (75 - 79)  BP: 169/83 (18 Jun 2022 12:39) (136/72 - 169/83)  BP(mean): --  RR: 18 (18 Jun 2022 12:39) (16 - 18)  SpO2: 93% (18 Jun 2022 12:39) (93% - 96%)    General:  Constitutional: Male, appears stated age, in pain thats positional  Head: Normocephalic; Eyes: clear sclera;   Ears: hearing reduced R ear  Extremities: No cyanosis;  Skin: nonpitting edema of LE  Resp: breathing comfortably     Neurological (>12):  MS: Awake, alert.  Follows commands.   Language: Speech is clear, fluent, normal volume, good repetition, comprehension, registration of words.  CNs: PERRL (R 2mm, L 2mm). has difficulty seeing in all quadrants (chronic) even with/without glasses. Can read close up. EOMI no nystagmus. V1-3 intact LT, No facial asymmetry b/l, full. Hearing grossly normal (rubbing fingers) b/l. Tongue midline. Shoulder shrug symmetric b/l    Motor: Normal muscle bulk & tone. Exam limited due to pain from fxr.              Deltoid	Biceps	Triceps	   R	5	5	5	5		 	  L	5	4	5	5		(biceps flex limited from pain of fxr)	  	H-Flex	K-Ext	D-Flex	P-Flex  R	4	5	4+	4+			 	   L	4-	4+	4+	4+		 exam limited from fxr on L side.    Sensation: Intact to LT, vibration, proprioception, temp b/l UE. Decreased LT, proprioception (appreciates at ankles b/l), vibration (mid shin in R and worse in L). Cortical: Extinction on DSS (neglect): none  Reflexes R/L:  Biceps(C5) 3/3  BR(C6) 2/2   Triceps(C7)  2/2 Patellar(L4)   1/1 Ankle 0/0  + cross adductor on reflex input on L inner thigh  Toes: downgoing bilaterally  No ankle clonus  Coordination: No dysmetria to FTN b/l UE, cannot assess HTS due to pain  Gait: Cannot get to standing position due to pain     LABORATORY:  CBC                       9.2    3.20  )-----------( 137      ( 18 Jun 2022 07:12 )             27.8     Chem 06-18    139  |  110<H>  |  37<H>  ----------------------------<  288<H>  4.2   |  19<L>  |  1.92<H>    Ca    8.8      18 Jun 2022 07:12  Phos  2.7     06-18  Mg     1.8     06-18    TPro  6.3  /  Alb  3.5  /  TBili  0.2  /  DBili  x   /  AST  24  /  ALT  71<H>  /  AlkPhos  109  06-18    LFTs LIVER FUNCTIONS - ( 18 Jun 2022 07:12 )  Alb: 3.5 g/dL / Pro: 6.3 g/dL / ALK PHOS: 109 U/L / ALT: 71 U/L / AST: 24 U/L / GGT: x           Coagulopathy   Lipid Panel   A1c   Cardiac enzymes     U/A   CSF  Other    STUDIES & IMAGING: (EEG, CT, MR, U/S, TTE/DUSTIN):    < from: MR Cervical Spine No Cont (06.17.22 @ 18:27) >    ACC: [id number] EXAM:  MR SPINE CERVICAL                          PROCEDURE DATE:  06/17/2022          INTERPRETATION:  CLINICAL INFORMATION: Neck pain status post fall.   Moderate-severe stenosis.    TECHNIQUE: Multiplanar multisequence MR of the cervical spine was   acquired.    COMPARISON: There are no prior MRI studies available for comparison.   Comparison is made to the most recent prior CT examination of the   cervical spine from 6/15/2022.    FINDINGS: No acute fractures or dislocations are seen. Otherwise, the   cervical alignment is maintained. There is no loss of vertebral body   height. There is no aggressive marrow signal change. Cord signal is   normal. Nonspecific fluid is seen between the C1-C2 articulation.    Level by level evaluation reveals:    At C2-C3: There is disc space desiccation with minor circumferential loss   of height. A shallow bulging disc is seen which mildly deforms the   ventral thecal sac. Facet and uncinate hypertrophy are noted bilaterally.   There is no spinal canal or foraminal stenosis.    At C3-C4: There is disc space desiccation with mild circumferential loss   of height. A posterior disc osteophyte complex deforms the ventral thecal   sac and nearly contacts the ventral surface of the cord.Bilateral facet   and uncinate hypertrophy are noted which contribute to severe left and   moderate right foraminal narrowing. There is overall minimal canal   stenosis.    At C4-C5: There is disc space desiccation with mild circumferential loss   ofheight. Shallow posterior disc osteophyte complex deforms the ventral   thecal sac. Bilateral facet and uncinate hypertrophy are noted which   contribute to mild left but no right foraminal narrowing. There is no   canal stenosis.    At C5-C6: There is diffuse disc degeneration with loss of height and   endplate osteophyte formation. A posterior disc osteophyte complex   deforms the ventral thecal sac and nearly contacts the ventral surface of   the cord. Bilateral facet and uncinate hypertrophy are noted which   contribute to severe left and mild right foraminal narrowing. There is   overall mild canal stenosis.    At C6-C7: There is diffuse disc degeneration with loss of height and   endplate osteophyte formation. A posterior disc osteophyte complex more   asymmetric towards the right lateral recess is seen which deforms the   ventral thecal sac and contacts the ventral surface of the cord with cord   flattening. No cord signal abnormality is seen. Bilateral facet and   uncinate hypertrophy are noted which contribute to severe right and   moderate left foraminal stenosis. There is overall moderate-severe canal   stenosis as well.    At C7-T1: Unremarkable.    Bilateral mastoid air cell effusions are partially visualized.    IMPRESSION: Multilevel cervical spondylosis with variable degrees of   canal and foraminal narrowing, as outlined. Findings are worst at the   C6-C7 level.    --- End of Report ---    MARTIN MURDOCK MD; Attending Radiologist  This document has been electronically signed. 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 States he has had increase in falls over few months but particularly increased since friday. Had 3 falls since last friday all with his L leg giving out although he notes his L leg is stronger than other. He fell off chair on Tuesday similarly and also tripped in bathroom. Has head, neck, left side pain. Does state he has been having chronic neck pain and although strength appears intact in uppers has been having some decrease as well. During events denied headaches, vision changes, dizziness, slurred speech, facial droop, new focal weakness/ numbness worse than baseline.      ROS: All systems negative except as documented in HPI    PAST MEDICAL & SURGICAL HISTORY:  Diabetes    Hypercholesteremia    Neuropathy    Hypertension    Renal stones  25 years ago    Fatty liver disease, nonalcoholic    Obesity    Hepatic encephalopathy    GERD with esophagitis  Gastritis &amp; Non Bleeding Ulcers    GIB (gastrointestinal bleeding)    S/P cholecystectomy    FAMILY HISTORY:  Family history of stomach cancer    Family history of hypertension    Family history of type 2 diabetes mellitus    SOCIAL HISTORY: No reports of current tobacco, alcohol, or illicit drug use    MEDICATIONS (HOME):  Home Medications:  aspirin 81 mg oral delayed release tablet: 1 tab(s) orally once a day (20 Dec 2021 13:25)  everolimus 1 mg oral tablet: 3 tab(s) orally 2 times a day (06 Jan 2022 14:46)  ezetimibe 10 mg oral tablet: 1 tab(s) orally once a day (06 Jan 2022 14:43)  folic acid 1 mg oral tablet: 1 tab(s) orally once a day (20 Dec 2021 13:25)  insulin glargine: 46 unit(s) subcutaneous once a day (at bedtime) (06 Jan 2022 14:39)  insulin lispro 100 units/mL injectable solution: 29 unit(s) injectable 3 times a day before meals  (06 Jan 2022 14:39)  Lovaza 1000 mg oral capsule: 2 cap(s) orally 2 times a day (20 Dec 2021 13:25)  mycophenolate mofetil 250 mg oral capsule: 500 milligram(s) orally 2 times a day (06 Jan 2022 14:46)  pantoprazole 40 mg oral delayed release tablet: 1 tab(s) orally 2 times a day (06 Jan 2022 14:46)  rosuvastatin 20 mg oral tablet: 1 tab(s) orally once a day (06 Jan 2022 14:43)  Zoloft 100 mg oral tablet: 1 tab(s) orally once a day (20 Dec 2021 13:25)    MEDICATIONS  (STANDING):  atorvastatin 80 milliGRAM(s) Oral at bedtime  dextrose 5%. 1000 milliLiter(s) (100 mL/Hr) IV Continuous <Continuous>  dextrose 5%. 1000 milliLiter(s) (50 mL/Hr) IV Continuous <Continuous>  dextrose 50% Injectable 25 Gram(s) IV Push once  dextrose 50% Injectable 12.5 Gram(s) IV Push once  dextrose 50% Injectable 25 Gram(s) IV Push once  everolimus (ZORTRESS) 4 milliGRAM(s) Oral <User Schedule>  folic acid 1 milliGRAM(s) Oral daily  glucagon  Injectable 1 milliGRAM(s) IntraMuscular once  heparin   Injectable 5000 Unit(s) SubCutaneous every 12 hours  insulin glargine Injectable (LANTUS) 8 Unit(s) SubCutaneous at bedtime  insulin lispro (ADMELOG) corrective regimen sliding scale   SubCutaneous three times a day before meals  insulin lispro (ADMELOG) corrective regimen sliding scale   SubCutaneous at bedtime  insulin lispro Injectable (ADMELOG) 6 Unit(s) SubCutaneous three times a day before meals  lidocaine   4% Patch 1 Patch Transdermal daily  lidocaine   4% Patch 1 Patch Transdermal daily  mycophenolate mofetil 250 milliGRAM(s) Oral two times a day  omega-3-Acid Ethyl Esters 1 Gram(s) Oral two times a day  pantoprazole    Tablet 40 milliGRAM(s) Oral before breakfast  predniSONE   Tablet 5 milliGRAM(s) Oral daily  sertraline 100 milliGRAM(s) Oral daily  sodium chloride 0.9%. 1000 milliLiter(s) (50 mL/Hr) IV Continuous <Continuous>  tamsulosin 0.4 milliGRAM(s) Oral at bedtime    MEDICATIONS  (PRN):  dextrose Oral Gel 15 Gram(s) Oral once PRN Blood Glucose LESS THAN 70 milliGRAM(s)/deciliter  traMADol 25 milliGRAM(s) Oral every 12 hours PRN Severe Pain (7 - 10)    ALLERGIES/INTOLERANCES:  Allergies  codeine (Anaphylaxis)    Intolerances    VITALS & EXAMINATION:  Vital Signs Last 24 Hrs  T(C): 36.9 (18 Jun 2022 12:39), Max: 36.9 (18 Jun 2022 06:09)  T(F): 98.4 (18 Jun 2022 12:39), Max: 98.4 (18 Jun 2022 06:09)  HR: 75 (18 Jun 2022 12:39) (75 - 79)  BP: 169/83 (18 Jun 2022 12:39) (136/72 - 169/83)  BP(mean): --  RR: 18 (18 Jun 2022 12:39) (16 - 18)  SpO2: 93% (18 Jun 2022 12:39) (93% - 96%)    General:  Constitutional: Male, appears stated age, in pain thats positional  Head: Normocephalic; Eyes: clear sclera;   Ears: hearing reduced R ear  CV/Extremities: No cyanosis; Peripheral pulses palpable, no edema  Skin: nonpitting edema of LE  Resp: breathing comfortably   Eyes: Fundoscopy not well visualized    Neurological (>12):  MS: Awake, alert.  Follows commands.   Language: Speech is clear, fluent, normal volume, good repetition, comprehension, registration of words.  CNs: PERRL (R 2mm, L 2mm). has difficulty seeing in all quadrants (chronic) even with/without glasses. Can read close up. EOMI no nystagmus. V1-3 intact LT, No facial asymmetry b/l, full. Hearing grossly normal (rubbing fingers) b/l. Tongue midline. Shoulder shrug symmetric b/l    Motor: Normal muscle bulk & tone. Exam limited due to pain from fxr.              Deltoid	Biceps	Triceps	   R	5	5	5	5		 	  L	5	4	5	5		(biceps flex limited from pain of fxr)	  	H-Flex	K-Ext	D-Flex	P-Flex  R	4	5	4+	4+			 	   L	4-	4+	4+	4+		 exam limited from fxr on L side.    Sensation: Intact to LT, vibration, proprioception, temp b/l UE. Decreased LT, proprioception (appreciates at ankles b/l), vibration (mid shin in R and worse in L). Cortical: Extinction on DSS (neglect): none  Reflexes R/L:  Biceps(C5) 3/3  BR(C6) 2/2   Triceps(C7)  2/2 Patellar(L4)   1/1 Ankle 0/0  + cross adductor on reflex input on L inner thigh  Toes: downgoing bilaterally  No ankle clonus  Coordination: No dysmetria to FTN b/l UE, cannot assess HTS due to pain  Gait: Cannot get to standing position due to pain     LABORATORY:  CBC                       9.2    3.20  )-----------( 137      ( 18 Jun 2022 07:12 )             27.8     Chem 06-18    139  |  110<H>  |  37<H>  ----------------------------<  288<H>  4.2   |  19<L>  |  1.92<H>    Ca    8.8      18 Jun 2022 07:12  Phos  2.7     06-18  Mg     1.8     06-18    TPro  6.3  /  Alb  3.5  /  TBili  0.2  /  DBili  x   /  AST  24  /  ALT  71<H>  /  AlkPhos  109  06-18    LFTs LIVER FUNCTIONS - ( 18 Jun 2022 07:12 )  Alb: 3.5 g/dL / Pro: 6.3 g/dL / ALK PHOS: 109 U/L / ALT: 71 U/L / AST: 24 U/L / GGT: x           Coagulopathy   Lipid Panel   A1c   Cardiac enzymes     U/A   CSF  Other    STUDIES & IMAGING: (EEG, CT, MR, U/S, TTE/DUSTIN):    < from: MR Cervical Spine No Cont (06.17.22 @ 18:27) >    ACC: [id number] EXAM:  MR SPINE CERVICAL                          PROCEDURE DATE:  06/17/2022          INTERPRETATION:  CLINICAL INFORMATION: Neck pain status post fall.   Moderate-severe stenosis.    TECHNIQUE: Multiplanar multisequence MR of the cervical spine was   acquired.    COMPARISON: There are no prior MRI studies available for comparison.   Comparison is made to the most recent prior CT examination of the   cervical spine from 6/15/2022.    FINDINGS: No acute fractures or dislocations are seen. Otherwise, the   cervical alignment is maintained. There is no loss of vertebral body   height. There is no aggressive marrow signal change. Cord signal is   normal. Nonspecific fluid is seen between the C1-C2 articulation.    Level by level evaluation reveals:    At C2-C3: There is disc space desiccation with minor circumferential loss   of height. A shallow bulging disc is seen which mildly deforms the   ventral thecal sac. Facet and uncinate hypertrophy are noted bilaterally.   There is no spinal canal or foraminal stenosis.    At C3-C4: There is disc space desiccation with mild circumferential loss   of height. A posterior disc osteophyte complex deforms the ventral thecal   sac and nearly contacts the ventral surface of the cord.Bilateral facet   and uncinate hypertrophy are noted which contribute to severe left and   moderate right foraminal narrowing. There is overall minimal canal   stenosis.    At C4-C5: There is disc space desiccation with mild circumferential loss   ofheight. Shallow posterior disc osteophyte complex deforms the ventral   thecal sac. Bilateral facet and uncinate hypertrophy are noted which   contribute to mild left but no right foraminal narrowing. There is no   canal stenosis.    At C5-C6: There is diffuse disc degeneration with loss of height and   endplate osteophyte formation. A posterior disc osteophyte complex   deforms the ventral thecal sac and nearly contacts the ventral surface of   the cord. Bilateral facet and uncinate hypertrophy are noted which   contribute to severe left and mild right foraminal narrowing. There is   overall mild canal stenosis.    At C6-C7: There is diffuse disc degeneration with loss of height and   endplate osteophyte formation. A posterior disc osteophyte complex more   asymmetric towards the right lateral recess is seen which deforms the   ventral thecal sac and contacts the ventral surface of the cord with cord   flattening. No cord signal abnormality is seen. Bilateral facet and   uncinate hypertrophy are noted which contribute to severe right and   moderate left foraminal stenosis. There is overall moderate-severe canal   stenosis as well.    At C7-T1: Unremarkable.    Bilateral mastoid air cell effusions are partially visualized.    IMPRESSION: Multilevel cervical spondylosis with variable degrees of   canal and foraminal narrowing, as outlined. Findings are worst at the   C6-C7 level.    --- End of Report ---    MARTIN MURDOCK MD; Attending Radiologist  This document has been electronically signed. Jun 18 2022  9:22AM    < end of copied text >

## 2022-06-18 NOTE — CONSULT NOTE ADULT - ASSESSMENT
66M adm transplant medicine, [facility] hx. Presenting w/ fall and ureteral stone c/b hydronephrosis. Endorses neck pain with movement. Nsgy consulted for MRI findings. MRI rec'd by neurology b/c patient having inc # of falls. CT C-spine just showing degen changes. CTH neg. MR C: multilevel spondylosis w/ canal/foraminal narrowing, worst at C5-7. Exam: AOx3, FC, PERRL, EOMI, no facial, 5/5 throughout, no drift, no haddad, no clonus.   -No acute nsgy intervention  -PT/OT  -Pain control  -Further w/u of falls per neurology  -Outatient f/u w/ Dr. Aguilera 1-2w from d/c

## 2022-06-18 NOTE — DISCHARGE NOTE PROVIDER - CARE PROVIDER_API CALL
Dagher, Nabil N (MD)  Surgery  [address] [place] [address]  Phone: [phone]  Fax: [phone]  Follow Up Time:     Yomi Medina)  Gastroenterology; Internal Medicine; Transplant Hepatology  [address] [place] [address]  Phone: [phone]  Fax: [phone]  Follow Up Time:    Dagher, Nabil N (MD)  Surgery  [address] [place] [address]  Phone: [phone]  Fax: [phone]  Follow Up Time:     Yomi Medina)  Gastroenterology; Internal Medicine; Transplant Hepatology  [address] [place] [address]  Phone: [phone]  Fax: [phone]  Follow Up Time:     Gaurang Viera)  Neurology; Neurophysiology  [address], [address]  [place] [address]  Phone: [phone]  Fax: [phone]  Follow Up Time:     Amish Aguilera)  Neurosurgery  [address], [address]  [place] [address]  Phone: [phone]  Fax: [phone]  Follow Up Time:

## 2022-06-18 NOTE — DISCHARGE NOTE PROVIDER - NSDCCPCAREPLAN_GEN_ALL_CORE_FT
PRINCIPAL DISCHARGE DIAGNOSIS  Diagnosis: S/P liver transplant  Assessment and Plan of Treatment: No heavy lifting anything more than 10-15lbs or straining. Otherwise, you may return to your usual level of physical activity. If you are taking narcotic pain medication (such as Percocet), do NOT drive a car, operate machinery or make important decisions.  Call trnansplant clinic If you developed any of the following, fever, pain, redness, swelling at incision site, cough, nausea, vomiting, painful urination, difficulty urination, or not making any urine.  NOTIFY YOUR SURGEON IF: You have any bleeding that does not stop, any pus draining from your wound, any fever (over 100.4 F) or chills, persistent nausea/vomiting with inability to tolerate food or liquids, persistent diarrhea, or if your pain is not controlled on your discharge pain medications.  Immunosuppression:   Keep away from people who have cough, cold, and symptom of flu.  Only take medications that are on your discharge list  If you missed your medications call the transplant office, do not double up medication because you missed a dose.  If you have any question regarding your medication please call transplant office.       PRINCIPAL DISCHARGE DIAGNOSIS  Diagnosis: Fall at home  Assessment and Plan of Treatment: You were admitted to the hospital for frequent falls at home.  You are going to a rehab center to continue to increase your strength and mobility  Please call neurologist  Dr. Viera to set up appointment for EMG/NCS within 1-2 weeks after dischargewithin 1 week after discharge from rehab.  Dr. Viera: [phone] [phone]) for EMG/NCS within 1-2 weeks after discharge      SECONDARY DISCHARGE DIAGNOSES  Diagnosis: S/P liver transplant  Assessment and Plan of Treatment: No heavy lifting anything more than 10-15lbs or straining. Otherwise, you may return to your usual level of physical activity. If you are taking narcotic pain medication (such as Percocet), do NOT drive a car, operate machinery or make important decisions.  Call transplant clinic If you developed any of the following, fever, pain, redness, swelling at incision site, cough, nausea, vomiting, painful urination, difficulty urination, or not making any urine.  NOTIFY YOUR SURGEON IF: You have any bleeding that does not stop, any pus draining from your wound, any fever (over 100.4 F) or chills, persistent nausea/vomiting with inability to tolerate food or liquids, persistent diarrhea, or if your pain is not controlled on your discharge pain medications.      Diagnosis: Immunosuppression  Assessment and Plan of Treatment: Keep away from people who have cough, cold, and symptom of flu.  Only take medications that are on your discharge list  If you missed your medications call the transplant office, do not double up medication because you missed a dose.  If you have any question regarding your medication please call transplant office.     PRINCIPAL DISCHARGE DIAGNOSIS  Diagnosis: Fall at home  Assessment and Plan of Treatment: You were admitted to the hospital for frequent falls at home.  You are going to a rehab center to continue to increase your strength and mobility  Please call neurologist  Dr. Viera to set up appointment for EMG/NCS within 1-2 weeks after discharge from   - Dr. Viera: [phone] [phone])      SECONDARY DISCHARGE DIAGNOSES  Diagnosis: S/P liver transplant  Assessment and Plan of Treatment: No heavy lifting anything more than 10-15lbs or straining. Otherwise, you may return to your usual level of physical activity. If you are taking narcotic pain medication (such as Percocet), do NOT drive a car, operate machinery or make important decisions.  Call transplant clinic If you developed any of the following, fever, pain, redness, swelling at incision site, cough, nausea, vomiting, painful urination, difficulty urination, or not making any urine.  NOTIFY YOUR SURGEON IF: You have any bleeding that does not stop, any pus draining from your wound, any fever (over 100.4 F) or chills, persistent nausea/vomiting with inability to tolerate food or liquids, persistent diarrhea, or if your pain is not controlled on your discharge pain medications.      Diagnosis: Immunosuppression  Assessment and Plan of Treatment: Keep away from people who have cough, cold, and symptom of flu.  Only take medications that are on your discharge list  If you missed your medications call the transplant office, do not double up medication because you missed a dose.  If you have any question regarding your medication please call transplant office.

## 2022-06-18 NOTE — DISCHARGE NOTE PROVIDER - NSDCMRMEDTOKEN_GEN_ALL_CORE_FT
aspirin 81 mg oral delayed release tablet: 1 tab(s) orally once a day  everolimus 1 mg oral tablet: 3 tab(s) orally 2 times a day  ezetimibe 10 mg oral tablet: 1 tab(s) orally once a day  folic acid 1 mg oral tablet: 1 tab(s) orally once a day  insulin glargine: 46 unit(s) subcutaneous once a day (at bedtime)  insulin lispro 100 units/mL injectable solution: 29 unit(s) injectable 3 times a day before meals   Lovaza 1000 mg oral capsule: 2 cap(s) orally 2 times a day  mycophenolate mofetil 250 mg oral capsule: 500 milligram(s) orally 2 times a day  pantoprazole 40 mg oral delayed release tablet: 1 tab(s) orally 2 times a day  rosuvastatin 20 mg oral tablet: 1 tab(s) orally once a day  Valcyte 450 mg oral tablet: 1 tab(s) orally once a day   Zoloft 100 mg oral tablet: 1 tab(s) orally once a day   3 in 1 commode :   aspirin 81 mg oral delayed release tablet: 1 tab(s) orally once a day  everolimus 1 mg oral tablet: 3 tab(s) orally 2 times a day  ezetimibe 10 mg oral tablet: 1 tab(s) orally once a day  folic acid 1 mg oral tablet: 1 tab(s) orally once a day  insulin glargine: 46 unit(s) subcutaneous once a day (at bedtime)  insulin lispro 100 units/mL injectable solution: 29 unit(s) injectable 3 times a day before meals   Lovaza 1000 mg oral capsule: 2 cap(s) orally 2 times a day  mycophenolate mofetil 250 mg oral capsule: 500 milligram(s) orally 2 times a day  pantoprazole 40 mg oral delayed release tablet: 1 tab(s) orally 2 times a day  rosuvastatin 20 mg oral tablet: 1 tab(s) orally once a day  Valcyte 450 mg oral tablet: 1 tab(s) orally once a day   Zoloft 100 mg oral tablet: 1 tab(s) orally once a day   Admelog 100 units/mL injectable solution: 10 unit(s) subcutaneous 3 times a day (before meals) with additional sliding scale coverage  everolimus 1 mg oral tablet: 4mg orally once a day at 8AM  everolimus 1 mg oral tablet: 3 milligram(s) orally once a day at 8PM  folic acid 1 mg oral tablet: 1 tab(s) orally once a day  heparin: 5000 unit(s) subcutaneous 2 times a day  insulin glargine 100 units/mL subcutaneous solution: 14 unit(s) subcutaneous once a day (at bedtime)  lidocaine 4% topical film: Apply topically to affected area once a day to R rib cage, As Needed  lidocaine 4% topical film: Apply topically to affected area once a day to cervical spine as needed  mycophenolate mofetil 250 mg oral capsule: 1 cap(s) orally 2 times a day  omega-3 polyunsaturated fatty acids ethyl esters 1000 mg oral capsule: 1 cap(s) orally 2 times a day  pantoprazole 40 mg oral delayed release tablet: 1 tab(s) orally once a day (before a meal)  polyethylene glycol 3350 oral powder for reconstitution: 17 gram(s) orally once a day  sertraline 100 mg oral tablet: 1 tab(s) orally once a day  traMADol 50 mg oral tablet: 0.5 tab(s) orally every 12 hours, As needed, Severe Pain (7 - 10)   Admelog 100 units/mL injectable solution: 10 unit(s) subcutaneous 3 times a day (before meals) with additional sliding scale coverage  everolimus 1 mg oral tablet: 3 milligram(s) orally 2 times a day    at 8AM and 8PM  folic acid 1 mg oral tablet: 1 tab(s) orally once a day  heparin: 5000 unit(s) subcutaneous 2 times a day  insulin glargine 100 units/mL subcutaneous solution: 14 unit(s) subcutaneous once a day (at bedtime)  lidocaine 4% topical film: Apply topically to affected area once a day to R rib cage, As Needed  lidocaine 4% topical film: Apply topically to affected area once a day to cervical spine as needed  mycophenolate mofetil 250 mg oral capsule: 1 cap(s) orally 2 times a day  omega-3 polyunsaturated fatty acids ethyl esters 1000 mg oral capsule: 1 cap(s) orally 2 times a day  pantoprazole 40 mg oral delayed release tablet: 1 tab(s) orally once a day (before a meal)  polyethylene glycol 3350 oral powder for reconstitution: 17 gram(s) orally once a day  sertraline 100 mg oral tablet: 1 tab(s) orally once a day  traMADol 50 mg oral tablet: 0.5 tab(s) orally every 12 hours, As needed, Severe Pain (7 - 10)

## 2022-06-18 NOTE — DIETITIAN INITIAL EVALUATION ADULT - ADD RECOMMEND
- Will continue to monitor PO intake, weight, labs, skin, GI status, diet.   - If not medically contraindicated, recommend MVI, vitamin C 500 mg daily + zinc 220 mg daily to promote wound healing.

## 2022-06-18 NOTE — PROGRESS NOTE ADULT - SUBJECTIVE AND OBJECTIVE BOX
[facility]  Ramón Falk PA-C  [facility] [place] [address]  [phone]      INTERVAL HPI/OVERNIGHT EVENTS:  pt seen and examined, no new events  feels well  no BM    MEDICATIONS  (STANDING):  atorvastatin 80 milliGRAM(s) Oral at bedtime  dextrose 5%. 1000 milliLiter(s) (100 mL/Hr) IV Continuous <Continuous>  dextrose 5%. 1000 milliLiter(s) (50 mL/Hr) IV Continuous <Continuous>  dextrose 50% Injectable 25 Gram(s) IV Push once  dextrose 50% Injectable 12.5 Gram(s) IV Push once  dextrose 50% Injectable 25 Gram(s) IV Push once  everolimus (ZORTRESS) 4 milliGRAM(s) Oral <User Schedule>  folic acid 1 milliGRAM(s) Oral daily  glucagon  Injectable 1 milliGRAM(s) IntraMuscular once  heparin   Injectable 5000 Unit(s) SubCutaneous every 12 hours  insulin glargine Injectable (LANTUS) 8 Unit(s) SubCutaneous at bedtime  insulin lispro (ADMELOG) corrective regimen sliding scale   SubCutaneous three times a day before meals  insulin lispro (ADMELOG) corrective regimen sliding scale   SubCutaneous at bedtime  insulin lispro Injectable (ADMELOG) 6 Unit(s) SubCutaneous three times a day before meals  lidocaine   4% Patch 1 Patch Transdermal daily  lidocaine   4% Patch 1 Patch Transdermal daily  mycophenolate mofetil 250 milliGRAM(s) Oral two times a day  omega-3-Acid Ethyl Esters 1 Gram(s) Oral two times a day  pantoprazole    Tablet 40 milliGRAM(s) Oral before breakfast  polyethylene glycol 3350 17 Gram(s) Oral once  predniSONE   Tablet 5 milliGRAM(s) Oral daily  sertraline 100 milliGRAM(s) Oral daily  sodium chloride 0.9%. 1000 milliLiter(s) (50 mL/Hr) IV Continuous <Continuous>  tamsulosin 0.4 milliGRAM(s) Oral at bedtime    MEDICATIONS  (PRN):  dextrose Oral Gel 15 Gram(s) Oral once PRN Blood Glucose LESS THAN 70 milliGRAM(s)/deciliter  traMADol 25 milliGRAM(s) Oral every 12 hours PRN Severe Pain (7 - 10)      Allergies    codeine (Anaphylaxis)    Intolerances        ROS:   General:  No wt loss, fevers, chills, night sweats, fatigue,   Eyes:  Good vision, no reported pain  ENT:  No sore throat, pain, runny nose, dysphagia  CV:  No pain, palpitations, hypo/hypertension  Resp:  No dyspnea, cough, tachypnea, wheezing  GI:  No pain, No nausea, No vomiting, No diarrhea, No constipation, No weight loss, No fever, No pruritis, No rectal bleeding, No tarry stools, No dysphagia,  :  No pain, bleeding, incontinence, nocturia  Muscle:  No pain, weakness  Neuro:  No weakness, tingling, memory problems  Psych:  No fatigue, insomnia, mood problems, depression  Endocrine:  No polyuria, polydipsia, cold/heat intolerance  Heme:  No petechiae, ecchymosis, easy bruisability  Skin:  No rash, tattoos, scars, edema      PHYSICAL EXAM:   Vital Signs:  Vital Signs Last 24 Hrs  T(C): 36.9 (2022 06:09), Max: 37.1 (2022 12:18)  T(F): 98.4 (2022 06:09), Max: 98.7 (2022 12:18)  HR: 79 (2022 06:09) (74 - 79)  BP: 136/72 (2022 06:09) (136/72 - 172/78)  BP(mean): --  RR: 18 (2022 06:09) (16 - 18)  SpO2: 96% (2022 06:09) (93% - 96%)  Daily     Daily Weight in k.5 (2022 21:06)    GENERAL:  Appears stated age,   HEENT:  NC/AT,    CHEST:  Full & symmetric excursion,   HEART:  Regular rhythm,  ABDOMEN:  Soft, non-tender, non-distended,  EXTEREMITIES:  no cyanosis  SKIN:  No rash  NEURO:  Alert,       LABS:                        9.2    3.20  )-----------( 137      ( 2022 07:12 )             27.8     -18    139  |  110<H>  |  37<H>  ----------------------------<  288<H>  4.2   |  19<L>  |  1.92<H>    Ca    8.8      2022 07:12  Phos  2.7       Mg     1.8         TPro  6.3  /  Alb  3.5  /  TBili  0.2  /  DBili  x   /  AST  24  /  ALT  71<H>  /  AlkPhos  109            RADIOLOGY & ADDITIONAL TESTS:

## 2022-06-18 NOTE — DIETITIAN INITIAL EVALUATION ADULT - NSFNSGIIOFT_GEN_A_CORE
- Pt denies nausea, vomiting, diarrhea, or constipation. state he had some diarrhea last week but resolved  - Last BM: states 2 days ago; currently ordered for bowel regimen (miralax)

## 2022-06-18 NOTE — DISCHARGE NOTE PROVIDER - NSDCFUADDAPPT_GEN_ALL_CORE_FT
FOLLOW-UP:  1. Please call to make a follow-up appointment with   ( date  )  2. Please follow up with your primary care physician in one week regarding your hospitalization. - Follow up in Transplant clinic for lab work and appointment in 2 weeks.     Phone: [phone]    - Neurology follow-up: Dr. Viera: [address] Phone [phone]) for EMG/NCS within 1-2 weeks after discharge    - Neurosurgery Follow-up: Dr. Aguilera within  1-2weeks after discharge     Phone: [phone]    - Please follow up with your primary care physician in one week regarding your hospitalization.

## 2022-06-18 NOTE — DIETITIAN INITIAL EVALUATION ADULT - PERSON TAUGHT/METHOD
- Provided education on Carbohydrate Consistent diet including sources of carbohydrates, portion sizes, pairing protein with carbohydrates, limiting sugar sweetened beverages in diet and the importance of consistent eating pattern to help optimize glycemic control.   - discussed low Na diet  - Pt denies having further questions/ concerns about diet and nutrition- made aware RD remains available./verbal instruction/written material/individual instruction/teach back - (Patient repeats in own words)/patient instructed

## 2022-06-18 NOTE — DISCHARGE NOTE PROVIDER - CARE PROVIDERS DIRECT ADDRESSES
,DirectAddress_Unknown,[email or web address] ,DirectAddress_Unknown,[email or web address],DirectAddress_Unknown,DirectAddress_Unknown

## 2022-06-19 LAB
24R-OH-CALCIDIOL SERPL-MCNC: 21.4 NG/ML — LOW (ref 30–80)
A1C WITH ESTIMATED AVERAGE GLUCOSE RESULT: 8 % — HIGH (ref 4–5.6)
ALBUMIN SERPL ELPH-MCNC: 3.3 G/DL — SIGNIFICANT CHANGE UP (ref 3.3–5)
ALP SERPL-CCNC: 111 U/L — SIGNIFICANT CHANGE UP (ref 40–120)
ALT FLD-CCNC: 85 U/L — HIGH (ref 10–45)
ANION GAP SERPL CALC-SCNC: 12 MMOL/L — SIGNIFICANT CHANGE UP (ref 5–17)
AST SERPL-CCNC: 46 U/L — HIGH (ref 10–40)
BILIRUB SERPL-MCNC: 0.2 MG/DL — SIGNIFICANT CHANGE UP (ref 0.2–1.2)
BUN SERPL-MCNC: 36 MG/DL — HIGH (ref 7–23)
CALCIUM SERPL-MCNC: 8.7 MG/DL — SIGNIFICANT CHANGE UP (ref 8.4–10.5)
CHLORIDE SERPL-SCNC: 109 MMOL/L — HIGH (ref 96–108)
CK SERPL-CCNC: 108 U/L — SIGNIFICANT CHANGE UP (ref 30–200)
CO2 SERPL-SCNC: 18 MMOL/L — LOW (ref 22–31)
CREAT SERPL-MCNC: 1.88 MG/DL — HIGH (ref 0.5–1.3)
CRP SERPL-MCNC: 22 MG/L — HIGH (ref 0–4)
EGFR: 39 ML/MIN/1.73M2 — LOW
ERYTHROCYTE [SEDIMENTATION RATE] IN BLOOD: 35 MM/HR — HIGH (ref 0–20)
ESTIMATED AVERAGE GLUCOSE: 183 MG/DL — HIGH (ref 68–114)
EVEROLIMUS, WHOLE BLOOD RESULT: 5.3 NG/ML — SIGNIFICANT CHANGE UP (ref 3–8)
GLUCOSE BLDC GLUCOMTR-MCNC: 166 MG/DL — HIGH (ref 70–99)
GLUCOSE BLDC GLUCOMTR-MCNC: 183 MG/DL — HIGH (ref 70–99)
GLUCOSE BLDC GLUCOMTR-MCNC: 262 MG/DL — HIGH (ref 70–99)
GLUCOSE BLDC GLUCOMTR-MCNC: 267 MG/DL — HIGH (ref 70–99)
GLUCOSE SERPL-MCNC: 247 MG/DL — HIGH (ref 70–99)
HCT VFR BLD CALC: 27.9 % — LOW (ref 39–50)
HGB BLD-MCNC: 9 G/DL — LOW (ref 13–17)
MAGNESIUM SERPL-MCNC: 1.8 MG/DL — SIGNIFICANT CHANGE UP (ref 1.6–2.6)
MCHC RBC-ENTMCNC: 29.7 PG — SIGNIFICANT CHANGE UP (ref 27–34)
MCHC RBC-ENTMCNC: 32.3 GM/DL — SIGNIFICANT CHANGE UP (ref 32–36)
MCV RBC AUTO: 92.1 FL — SIGNIFICANT CHANGE UP (ref 80–100)
NRBC # BLD: 0 /100 WBCS — SIGNIFICANT CHANGE UP (ref 0–0)
PHOSPHATE SERPL-MCNC: 2.8 MG/DL — SIGNIFICANT CHANGE UP (ref 2.5–4.5)
PLATELET # BLD AUTO: 142 K/UL — LOW (ref 150–400)
POTASSIUM SERPL-MCNC: 4.3 MMOL/L — SIGNIFICANT CHANGE UP (ref 3.5–5.3)
POTASSIUM SERPL-SCNC: 4.3 MMOL/L — SIGNIFICANT CHANGE UP (ref 3.5–5.3)
PROT SERPL-MCNC: 6 G/DL — SIGNIFICANT CHANGE UP (ref 6–8.3)
RBC # BLD: 3.03 M/UL — LOW (ref 4.2–5.8)
RBC # FLD: 13.5 % — SIGNIFICANT CHANGE UP (ref 10.3–14.5)
SODIUM SERPL-SCNC: 139 MMOL/L — SIGNIFICANT CHANGE UP (ref 135–145)
T4 FREE SERPL-MCNC: 1 NG/DL — SIGNIFICANT CHANGE UP (ref 0.9–1.8)
TSH SERPL-MCNC: 1.45 UIU/ML — SIGNIFICANT CHANGE UP (ref 0.27–4.2)
VIT B12 SERPL-MCNC: 838 PG/ML — SIGNIFICANT CHANGE UP (ref 232–1245)
WBC # BLD: 3.62 K/UL — LOW (ref 3.8–10.5)
WBC # FLD AUTO: 3.62 K/UL — LOW (ref 3.8–10.5)

## 2022-06-19 PROCEDURE — 99232 SBSQ HOSP IP/OBS MODERATE 35: CPT | Mod: GC

## 2022-06-19 RX ORDER — MAGNESIUM SULFATE 500 MG/ML
2 VIAL (ML) INJECTION ONCE
Refills: 0 | Status: COMPLETED | OUTPATIENT
Start: 2022-06-19 | End: 2022-06-19

## 2022-06-19 RX ADMIN — Medication 2: at 13:11

## 2022-06-19 RX ADMIN — Medication 2: at 21:29

## 2022-06-19 RX ADMIN — HEPARIN SODIUM 5000 UNIT(S): 5000 INJECTION INTRAVENOUS; SUBCUTANEOUS at 05:04

## 2022-06-19 RX ADMIN — HEPARIN SODIUM 5000 UNIT(S): 5000 INJECTION INTRAVENOUS; SUBCUTANEOUS at 17:20

## 2022-06-19 RX ADMIN — MYCOPHENOLATE MOFETIL 250 MILLIGRAM(S): 250 CAPSULE ORAL at 05:04

## 2022-06-19 RX ADMIN — TRAMADOL HYDROCHLORIDE 25 MILLIGRAM(S): 50 TABLET ORAL at 21:58

## 2022-06-19 RX ADMIN — EVEROLIMUS 4 MILLIGRAM(S): 10 TABLET ORAL at 19:45

## 2022-06-19 RX ADMIN — EVEROLIMUS 4 MILLIGRAM(S): 10 TABLET ORAL at 07:34

## 2022-06-19 RX ADMIN — LIDOCAINE 1 PATCH: 4 CREAM TOPICAL at 11:11

## 2022-06-19 RX ADMIN — Medication 6 UNIT(S): at 13:11

## 2022-06-19 RX ADMIN — LIDOCAINE 1 PATCH: 4 CREAM TOPICAL at 19:38

## 2022-06-19 RX ADMIN — LIDOCAINE 1 PATCH: 4 CREAM TOPICAL at 11:10

## 2022-06-19 RX ADMIN — Medication 6 UNIT(S): at 17:55

## 2022-06-19 RX ADMIN — Medication 1 GRAM(S): at 05:04

## 2022-06-19 RX ADMIN — Medication 6: at 09:05

## 2022-06-19 RX ADMIN — MYCOPHENOLATE MOFETIL 250 MILLIGRAM(S): 250 CAPSULE ORAL at 17:19

## 2022-06-19 RX ADMIN — TAMSULOSIN HYDROCHLORIDE 0.4 MILLIGRAM(S): 0.4 CAPSULE ORAL at 21:28

## 2022-06-19 RX ADMIN — Medication 25 GRAM(S): at 17:19

## 2022-06-19 RX ADMIN — Medication 5 MILLIGRAM(S): at 05:04

## 2022-06-19 RX ADMIN — INSULIN GLARGINE 8 UNIT(S): 100 INJECTION, SOLUTION SUBCUTANEOUS at 21:28

## 2022-06-19 RX ADMIN — Medication 1 GRAM(S): at 17:19

## 2022-06-19 RX ADMIN — Medication 6 UNIT(S): at 09:06

## 2022-06-19 RX ADMIN — SERTRALINE 100 MILLIGRAM(S): 25 TABLET, FILM COATED ORAL at 11:10

## 2022-06-19 RX ADMIN — Medication 1 MILLIGRAM(S): at 11:10

## 2022-06-19 RX ADMIN — Medication 2: at 17:55

## 2022-06-19 RX ADMIN — PANTOPRAZOLE SODIUM 40 MILLIGRAM(S): 20 TABLET, DELAYED RELEASE ORAL at 05:05

## 2022-06-19 RX ADMIN — ATORVASTATIN CALCIUM 80 MILLIGRAM(S): 80 TABLET, FILM COATED ORAL at 21:28

## 2022-06-19 RX ADMIN — TRAMADOL HYDROCHLORIDE 25 MILLIGRAM(S): 50 TABLET ORAL at 21:28

## 2022-06-19 NOTE — PROGRESS NOTE ADULT - SUBJECTIVE AND OBJECTIVE BOX
Interval Events:   No acute events overnight.  Patient still endorsing pain over right rib cage.    ROS:   12 point review of systems performed and negative except otherwise noted in HPI.    Hospital Medications:  atorvastatin 80 milliGRAM(s) Oral at bedtime  dextrose 5%. 1000 milliLiter(s) IV Continuous <Continuous>  dextrose 5%. 1000 milliLiter(s) IV Continuous <Continuous>  dextrose 50% Injectable 25 Gram(s) IV Push once  dextrose 50% Injectable 12.5 Gram(s) IV Push once  dextrose 50% Injectable 25 Gram(s) IV Push once  dextrose Oral Gel 15 Gram(s) Oral once PRN  everolimus (ZORTRESS) 4 milliGRAM(s) Oral <User Schedule>  folic acid 1 milliGRAM(s) Oral daily  glucagon  Injectable 1 milliGRAM(s) IntraMuscular once  heparin   Injectable 5000 Unit(s) SubCutaneous every 12 hours  insulin glargine Injectable (LANTUS) 8 Unit(s) SubCutaneous at bedtime  insulin lispro (ADMELOG) corrective regimen sliding scale   SubCutaneous three times a day before meals  insulin lispro (ADMELOG) corrective regimen sliding scale   SubCutaneous at bedtime  insulin lispro Injectable (ADMELOG) 6 Unit(s) SubCutaneous three times a day before meals  lidocaine   4% Patch 1 Patch Transdermal daily  lidocaine   4% Patch 1 Patch Transdermal daily  mycophenolate mofetil 250 milliGRAM(s) Oral two times a day  omega-3-Acid Ethyl Esters 1 Gram(s) Oral two times a day  pantoprazole    Tablet 40 milliGRAM(s) Oral before breakfast  predniSONE   Tablet 5 milliGRAM(s) Oral daily  sertraline 100 milliGRAM(s) Oral daily  sodium chloride 0.9%. 1000 milliLiter(s) IV Continuous <Continuous>  tamsulosin 0.4 milliGRAM(s) Oral at bedtime  traMADol 25 milliGRAM(s) Oral every 12 hours PRN      PHYSICAL EXAM:   Vital Signs:  Vital Signs Last 24 Hrs  T(C): 37 (2022 04:43), Max: 37 (2022 21:01)  T(F): 98.6 (2022 04:43), Max: 98.6 (2022 21:01)  HR: 80 (2022 04:43) (75 - 82)  BP: 147/71 (2022 04:43) (147/71 - 169/83)  BP(mean): --  RR: 18 (2022 04:43) (18 - 18)  SpO2: 93% (2022 04:43) (93% - 98%)  Daily     Daily Weight in k.7 (2022 04:43)    GENERAL: no acute distress  NEURO: alert  HEENT: NCAT, no conjunctival pallor appreciated  CHEST: no respiratory distress, no accessory muscle use  CARDIAC: regular rate, +S1/S2  ABDOMEN: soft, nontender, no rebound or guarding  EXTREMITIES: warm, well perfused  SKIN: no lesions noted    LABS: reviewed                        9.0    3.62  )-----------( 142      ( 2022 06:43 )             27.9         139  |  109<H>  |  36<H>  ----------------------------<  247<H>  4.3   |  18<L>  |  1.88<H>    Ca    8.7      2022 06:41  Phos  2.8       Mg     1.8         TPro  6.0  /  Alb  3.3  /  TBili  0.2  /  DBili  x   /  AST  46<H>  /  ALT  85<H>  /  AlkPhos  111      LIVER FUNCTIONS - ( 2022 06:41 )  Alb: 3.3 g/dL / Pro: 6.0 g/dL / ALK PHOS: 111 U/L / ALT: 85 U/L / AST: 46 U/L / GGT: x             Interval Diagnostic Studies: see sunrise for full report

## 2022-06-19 NOTE — PROGRESS NOTE ADULT - ASSESSMENT
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    s/p OLT 7/2/20 (course c/b VRE bacteremia, L Foot OM, CNI toxicity to both Tacrolimus and Cyclosporine; switched to Everolimus 7/27/20).  Recent syncope and fall, COVID all in 12/2022.  Transferred s/p fall from [place]    s/p fall   -CT neck with mod-severe spinal stenosis with associated pain. Neuro/NeuroSx teams consulted, appreciate input  -MRI neck non-con with mod canal stenosis  - Neurology recommendations noted (labs and MRI T&L) ordered   - neurosurgery (spine) consulted no sx intervention at this time rec PT/OT  -PT/OT  -no cardiac concerns at this time  -Dental consult appreciated Rec;SL: No signs of acute odontogenic infection, fu outpt for comprehensive dental care    L ureteral stone with hydronephrosis  -Appreciate  input  -continue PO/IV hydration and Flomax in hopes stone will pass  -Renal U/S with no acute stone, mild hydro   -mild MISA on arrival, Cr trending down    s/p OLT  -good graft function  -SQH/SCDs    Immunosuppression  -continue Everolimus per level, MMF  250BID, Pred 5  -PPx: Valcyte (recent low level viremia, Transplant ID to review), PPI    Dispo:  - PT recs for SANTANA when medically cleared (pt refused SANTANA for now wants to be DC home with home PT)

## 2022-06-19 NOTE — PROGRESS NOTE ADULT - ASSESSMENT
66M w/ PMHx JEAN cirrhosis s/p OLT 7/2/20 (course c/b VRE bacteremia, CNI toxicity to both tacro and cyclosporine; switched to everolimus 7/27/20), HLD, IDDM, obesity, HFpEF presenting w/ fall and ureteral stone to [facility] on 6/15/22, now transferred to [facility] for further management.     Impression:   #Fall, rib fracture: now second admission for falls c/b rib fracture   #Ureteral stone c/b hydronephrosis, stable/improved  #Elevated liver enzymes: liver biopsy 12/30/21 without e/o rejection, found to have NRH  #JEAN cirrhosis s/p LT  #CMV viremia   #DM  #Anemia    Recommendations:  - renal US reveals stable-improved left hydronephrosis  - evaluation of ureteral stone/management per urology   - neuro evaluation re: recurrent falls, stating likely 2/2 spinal canal stenosis however NSGY with no plans for surgical intervention  - immunosuppression: everolimus 4 mg BID (dose by level), and CellCept 250 mg BID, and prednisone 5mg daily  - prophylactic dose of Valcyte 450 mg daily  - will need to discuss with ID re: remaining on valcyte ppx    Recommendations incomplete until finalized by attending signature/attestation to note.    Gallo Rodriguez, PGY-4  GI/Hepatology Fellow    MONDAY-FRIDAY 8AM-5PM:  Pager# [phone] ([facility]) or [phone] ([facility])    NON-URGENT CONSULTS:  Please email [email or web address]  AT NIGHT AND ON WEEKENDS:  Contact on-call GI fellow via answering service ([phone]) from 5pm-8am and on weekends/holidays

## 2022-06-19 NOTE — PROGRESS NOTE ADULT - SUBJECTIVE AND OBJECTIVE BOX
[facility]  Ramón Falk PA-C  [facility] [place] [address]  [phone]      INTERVAL HPI/OVERNIGHT EVENTS:  pt seen and examined, no new events  tolerating diet, +BM yesterday   LFTs down trending     MEDICATIONS  (STANDING):  atorvastatin 80 milliGRAM(s) Oral at bedtime  dextrose 5%. 1000 milliLiter(s) (100 mL/Hr) IV Continuous <Continuous>  dextrose 5%. 1000 milliLiter(s) (50 mL/Hr) IV Continuous <Continuous>  dextrose 50% Injectable 25 Gram(s) IV Push once  dextrose 50% Injectable 12.5 Gram(s) IV Push once  dextrose 50% Injectable 25 Gram(s) IV Push once  everolimus (ZORTRESS) 4 milliGRAM(s) Oral <User Schedule>  folic acid 1 milliGRAM(s) Oral daily  glucagon  Injectable 1 milliGRAM(s) IntraMuscular once  heparin   Injectable 5000 Unit(s) SubCutaneous every 12 hours  insulin glargine Injectable (LANTUS) 8 Unit(s) SubCutaneous at bedtime  insulin lispro (ADMELOG) corrective regimen sliding scale   SubCutaneous three times a day before meals  insulin lispro (ADMELOG) corrective regimen sliding scale   SubCutaneous at bedtime  insulin lispro Injectable (ADMELOG) 6 Unit(s) SubCutaneous three times a day before meals  lidocaine   4% Patch 1 Patch Transdermal daily  lidocaine   4% Patch 1 Patch Transdermal daily  mycophenolate mofetil 250 milliGRAM(s) Oral two times a day  omega-3-Acid Ethyl Esters 1 Gram(s) Oral two times a day  pantoprazole    Tablet 40 milliGRAM(s) Oral before breakfast  polyethylene glycol 3350 17 Gram(s) Oral once  predniSONE   Tablet 5 milliGRAM(s) Oral daily  sertraline 100 milliGRAM(s) Oral daily  sodium chloride 0.9%. 1000 milliLiter(s) (50 mL/Hr) IV Continuous <Continuous>  tamsulosin 0.4 milliGRAM(s) Oral at bedtime    MEDICATIONS  (PRN):  dextrose Oral Gel 15 Gram(s) Oral once PRN Blood Glucose LESS THAN 70 milliGRAM(s)/deciliter  traMADol 25 milliGRAM(s) Oral every 12 hours PRN Severe Pain (7 - 10)      Allergies    codeine (Anaphylaxis)    Intolerances        ROS:   General:  No wt loss, fevers, chills, night sweats, fatigue,   Eyes:  Good vision, no reported pain  ENT:  No sore throat, pain, runny nose, dysphagia  CV:  No pain, palpitations, hypo/hypertension  Resp:  No dyspnea, cough, tachypnea, wheezing  GI:  No pain, No nausea, No vomiting, No diarrhea, No constipation, No weight loss, No fever, No pruritis, No rectal bleeding, No tarry stools, No dysphagia,  :  No pain, bleeding, incontinence, nocturia  Muscle:  No pain, weakness  Neuro:  No weakness, tingling, memory problems  Psych:  No fatigue, insomnia, mood problems, depression  Endocrine:  No polyuria, polydipsia, cold/heat intolerance  Heme:  No petechiae, ecchymosis, easy bruisability  Skin:  No rash, tattoos, scars, edema      PHYSICAL EXAM:   Vital Signs Last 24 Hrs  T(C): 37 (2022 04:43), Max: 37 (2022 21:01)  T(F): 98.6 (2022 04:43), Max: 98.6 (2022 21:01)  HR: 80 (2022 04:43) (75 - 82)  BP: 147/71 (2022 04:43) (147/71 - 169/83)  BP(mean): --  RR: 18 (2022 04:43) (18 - 18)  SpO2: 93% (2022 04:43) (93% - 98%)  Daily     Daily Weight in k.5 (2022 21:06)    GENERAL:  Appears stated age,   HEENT:  NC/AT,    CHEST:  Full & symmetric excursion,   HEART:  Regular rhythm,  ABDOMEN:  Soft, non-tender, non-distended,  EXTEREMITIES:  no cyanosis  SKIN:  No rash  NEURO:  Alert,       LABS:                        9.0    3.62  )-----------( 142      ( 2022 06:43 )             27.9   06-19    139  |  109<H>  |  36<H>  ----------------------------<  247<H>  4.3   |  18<L>  |  1.88<H>    Ca    8.7      2022 06:41  Phos  2.8       Mg     1.8         TPro  6.0  /  Alb  3.3  /  TBili  0.2  /  DBili  x   /  AST  46<H>  /  ALT  85<H>  /  AlkPhos  111          RADIOLOGY & ADDITIONAL TESTS:

## 2022-06-19 NOTE — PROGRESS NOTE ADULT - SUBJECTIVE AND OBJECTIVE BOX
Transplant Surgery - Multidisciplinary Rounds  --------------------------------------------------------------  OLT 7/2/2020    Present:   Patient seen and examined with multidisciplinary Transplant team including  Surgeon: Dr. Ackerman, Hepatologist: Dr. Rose, NP Patrick  unit RNs during AM rounds.   Disciplines not in attendance will be notified of the plan.     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    s/p OLT 7/2/20 (course c/b VRE bacteremia, L foot OM, CNI toxicity to both Tacrolimus and Cyclosporine; switched to Everolimus 7/27/20    Admitted from 12/20/21 to 1/6/22 s/p fall at home, no LOC or injuries.  - Found to be +Covid w/ CT chest findings concerning for beginnings of multifocal PNA. Received remdesivir and mononclonal antibodies and on dexamethasone (12/29-1/7).   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 PCP prophylaxis changed from bactrim to atovaquone for LFTs.   -Patient was found to have DVT on L femoral vein and started on full treatment AC, now on Eliquis 5mg BID.     ------------  Transferred to [facility] from [facility]"" on 6/16/2022 where he was admitted for multiple falls over 24hr period.  +HA, +neck pain, +L rib pain.  Imaging revealed:  -C5-C7 mod-severe spinal stenosis  -L rib fx  -5mm distal L ureteral stone  -diarrhea    Interval Events:  -afebrile, VSS  -c/o tooth discomfort (has cap) limiting PO seen by dentist no infection  -baseline independent post-txp, occasionally requiring walker.  in-house, pt with continued b/l LE weakness limiting mobility  -Seen by Neuro rec neuro sx   -neck and L rib pain, somewhat controlled  -tolerating PO        Imunnosupression:  Everolimus 4mg BID fu daily level, MMF 250mg bid (due to GI symptoms), pred 5QD  Potential Discharge date: pending clinical stability  Education:  Medications  Plan of care:  See Below       MEDICATIONS  (STANDING):  atorvastatin 80 milliGRAM(s) Oral at bedtime  dextrose 5%. 1000 milliLiter(s) (100 mL/Hr) IV Continuous <Continuous>  dextrose 5%. 1000 milliLiter(s) (50 mL/Hr) IV Continuous <Continuous>  dextrose 50% Injectable 25 Gram(s) IV Push once  dextrose 50% Injectable 12.5 Gram(s) IV Push once  dextrose 50% Injectable 25 Gram(s) IV Push once  everolimus (ZORTRESS) 4 milliGRAM(s) Oral <User Schedule>  folic acid 1 milliGRAM(s) Oral daily  glucagon  Injectable 1 milliGRAM(s) IntraMuscular once  heparin   Injectable 5000 Unit(s) SubCutaneous every 12 hours  insulin glargine Injectable (LANTUS) 8 Unit(s) SubCutaneous at bedtime  insulin lispro (ADMELOG) corrective regimen sliding scale   SubCutaneous at bedtime  insulin lispro (ADMELOG) corrective regimen sliding scale   SubCutaneous three times a day before meals  insulin lispro Injectable (ADMELOG) 6 Unit(s) SubCutaneous three times a day before meals  lidocaine   4% Patch 1 Patch Transdermal daily  lidocaine   4% Patch 1 Patch Transdermal daily  mycophenolate mofetil 250 milliGRAM(s) Oral two times a day  omega-3-Acid Ethyl Esters 1 Gram(s) Oral two times a day  pantoprazole    Tablet 40 milliGRAM(s) Oral before breakfast  predniSONE   Tablet 5 milliGRAM(s) Oral daily  sertraline 100 milliGRAM(s) Oral daily  sodium chloride 0.9%. 1000 milliLiter(s) (50 mL/Hr) IV Continuous <Continuous>  tamsulosin 0.4 milliGRAM(s) Oral at bedtime    MEDICATIONS  (PRN):  dextrose Oral Gel 15 Gram(s) Oral once PRN Blood Glucose LESS THAN 70 milliGRAM(s)/deciliter  traMADol 25 milliGRAM(s) Oral every 12 hours PRN Severe Pain (7 - 10)      PAST MEDICAL & SURGICAL HISTORY:  Diabetes  Hypercholesteremia  Neuropathy  Hypertension  Renal stones  25 years ago  Fatty liver disease, nonalcoholic  Obesity  Hepatic encephalopathy  GERD with esophagitis  Gastritis &amp; Non Bleeding Ulcers  GIB (gastrointestinal bleeding)  S/P cholecystectomy      Vital Signs Last 24 Hrs  T(C): 37 (19 Jun 2022 04:43), Max: 37 (18 Jun 2022 21:01)  T(F): 98.6 (19 Jun 2022 04:43), Max: 98.6 (18 Jun 2022 21:01)  HR: 80 (19 Jun 2022 04:43) (80 - 82)  BP: 147/71 (19 Jun 2022 04:43) (147/71 - 158/72)  BP(mean): --  RR: 18 (19 Jun 2022 04:43) (18 - 18)  SpO2: 93% (19 Jun 2022 04:43) (93% - 98%)    I&O's Summary    18 Jun 2022 07:01  -  19 Jun 2022 07:00  --------------------------------------------------------  IN: 2780 mL / OUT: 1380 mL / NET: 1400 mL    19 Jun 2022 07:01  -  19 Jun 2022 12:49  --------------------------------------------------------  IN: 480 mL / OUT: 650 mL / NET: -170 mL                          9.0    3.62  )-----------( 142      ( 19 Jun 2022 06:43 )             27.9     06-19    139  |  109<H>  |  36<H>  ----------------------------<  247<H>  4.3   |  18<L>  |  1.88<H>    Ca    8.7      19 Jun 2022 06:41  Phos  2.8     06-19  Mg     1.8     06-19    TPro  6.0  /  Alb  3.3  /  TBili  0.2  /  DBili  x   /  AST  46<H>  /  ALT  85<H>  /  AlkPhos  111  06-19      Culture - Blood (collected 06-16-22 @ 17:21)  Source: .Blood Blood  Preliminary Report (06-17-22 @ 23:02):    No growth to date.    Culture - Blood (collected 06-16-22 @ 17:21)  Source: .Blood Blood  Preliminary Report (06-17-22 @ 23:02):    No growth to date.    Culture - Urine (collected 06-16-22 @ 10:07)  Source: Clean Catch Clean Catch (Midstream)  Final Report (06-17-22 @ 14:27):    <10,000 CFU/mL Normal Urogenital Shantel    Culture - Urine (collected 06-16-22 @ 00:52)  Source: Clean Catch Clean Catch (Midstream)  Final Report (06-16-22 @ 23:14):    <10,000 CFU/mL Normal Urogenital Shantel       Review of systems  Gen: No weight changes, fatigue, fevers/chills, see hpi  Skin: No rashes  Head/Eyes/Ears/Mouth: No headache; Normal hearing; Normal vision w/o blurriness; No sinus pain/discomfort, sore throat  Respiratory: No dyspnea, cough, wheezing, hemoptysis  CV: No chest pain, PND, orthopnea  GI:  no diarrhea, constipation, nausea, vomiting, melena, hematochezia  : No increased frequency, dysuria, hematuria, nocturia  MSK: see hpi, no edema  Neuro: No dizziness/lightheadedness, weakness, seizures, numbness, tingling  Heme: No easy bruising or bleeding  Endo: No heat/cold intolerance  Psych: No significant nervousness, anxiety, stress, depression  All other systems were reviewed and are negative, except as noted.      PHYSICAL EXAM:  Constitutional: Well developed / well nourished  Eyes: anicteric, PERRLA  ENMT: nc/at, no thrush, loose front tooth  Neck: supple  Respiratory: CTA B/L  Cardiovascular: RRR.  Gastrointestinal: Soft abdomen, ND/NT. well healed incisional scar  Genitourinary: Voiding spontaneously  Extremities: SCD's in place and working bilaterally, no calf TTP. no edema. L rib pain to palpation  Vascular: Palpable dp pulses bilaterally.   Neurological: A&O x3  Skin: no rashes, ulcerations, lesions  Musculoskeletal: Moving all extremities, decreased strength throughout  Psychiatric: Responsive

## 2022-06-20 ENCOUNTER — APPOINTMENT (OUTPATIENT)
Age: 67
End: 2022-06-20

## 2022-06-20 LAB
ALBUMIN SERPL ELPH-MCNC: 3.4 G/DL — SIGNIFICANT CHANGE UP (ref 3.3–5)
ALP SERPL-CCNC: 121 U/L — HIGH (ref 40–120)
ALT FLD-CCNC: 124 U/L — HIGH (ref 10–45)
ANION GAP SERPL CALC-SCNC: 10 MMOL/L — SIGNIFICANT CHANGE UP (ref 5–17)
AST SERPL-CCNC: 69 U/L — HIGH (ref 10–40)
BASOPHILS # BLD AUTO: 0.02 K/UL — SIGNIFICANT CHANGE UP (ref 0–0.2)
BASOPHILS NFR BLD AUTO: 0.6 % — SIGNIFICANT CHANGE UP (ref 0–2)
BILIRUB SERPL-MCNC: 0.2 MG/DL — SIGNIFICANT CHANGE UP (ref 0.2–1.2)
BUN SERPL-MCNC: 37 MG/DL — HIGH (ref 7–23)
CALCIUM SERPL-MCNC: 8.9 MG/DL — SIGNIFICANT CHANGE UP (ref 8.4–10.5)
CHLORIDE SERPL-SCNC: 107 MMOL/L — SIGNIFICANT CHANGE UP (ref 96–108)
CO2 SERPL-SCNC: 21 MMOL/L — LOW (ref 22–31)
CREAT SERPL-MCNC: 1.91 MG/DL — HIGH (ref 0.5–1.3)
EGFR: 38 ML/MIN/1.73M2 — LOW
EOSINOPHIL # BLD AUTO: 0.09 K/UL — SIGNIFICANT CHANGE UP (ref 0–0.5)
EOSINOPHIL NFR BLD AUTO: 2.6 % — SIGNIFICANT CHANGE UP (ref 0–6)
GLUCOSE BLDC GLUCOMTR-MCNC: 182 MG/DL — HIGH (ref 70–99)
GLUCOSE BLDC GLUCOMTR-MCNC: 241 MG/DL — HIGH (ref 70–99)
GLUCOSE BLDC GLUCOMTR-MCNC: 265 MG/DL — HIGH (ref 70–99)
GLUCOSE BLDC GLUCOMTR-MCNC: 283 MG/DL — HIGH (ref 70–99)
GLUCOSE SERPL-MCNC: 294 MG/DL — HIGH (ref 70–99)
HCT VFR BLD CALC: 28.7 % — LOW (ref 39–50)
HGB BLD-MCNC: 9.1 G/DL — LOW (ref 13–17)
IMM GRANULOCYTES NFR BLD AUTO: 0.6 % — SIGNIFICANT CHANGE UP (ref 0–1.5)
LYMPHOCYTES # BLD AUTO: 0.95 K/UL — LOW (ref 1–3.3)
LYMPHOCYTES # BLD AUTO: 27.5 % — SIGNIFICANT CHANGE UP (ref 13–44)
MAGNESIUM SERPL-MCNC: 2 MG/DL — SIGNIFICANT CHANGE UP (ref 1.6–2.6)
MCHC RBC-ENTMCNC: 29.2 PG — SIGNIFICANT CHANGE UP (ref 27–34)
MCHC RBC-ENTMCNC: 31.7 GM/DL — LOW (ref 32–36)
MCV RBC AUTO: 92 FL — SIGNIFICANT CHANGE UP (ref 80–100)
MONOCYTES # BLD AUTO: 0.35 K/UL — SIGNIFICANT CHANGE UP (ref 0–0.9)
MONOCYTES NFR BLD AUTO: 10.1 % — SIGNIFICANT CHANGE UP (ref 2–14)
NEUTROPHILS # BLD AUTO: 2.03 K/UL — SIGNIFICANT CHANGE UP (ref 1.8–7.4)
NEUTROPHILS NFR BLD AUTO: 58.6 % — SIGNIFICANT CHANGE UP (ref 43–77)
NRBC # BLD: 0 /100 WBCS — SIGNIFICANT CHANGE UP (ref 0–0)
PHOSPHATE SERPL-MCNC: 2.8 MG/DL — SIGNIFICANT CHANGE UP (ref 2.5–4.5)
PLATELET # BLD AUTO: 144 K/UL — LOW (ref 150–400)
POTASSIUM SERPL-MCNC: 4.5 MMOL/L — SIGNIFICANT CHANGE UP (ref 3.5–5.3)
POTASSIUM SERPL-SCNC: 4.5 MMOL/L — SIGNIFICANT CHANGE UP (ref 3.5–5.3)
PROT SERPL-MCNC: 6.4 G/DL — SIGNIFICANT CHANGE UP (ref 6–8.3)
RBC # BLD: 3.12 M/UL — LOW (ref 4.2–5.8)
RBC # FLD: 13.5 % — SIGNIFICANT CHANGE UP (ref 10.3–14.5)
SODIUM SERPL-SCNC: 138 MMOL/L — SIGNIFICANT CHANGE UP (ref 135–145)
T PALLIDUM AB TITR SER: NEGATIVE — SIGNIFICANT CHANGE UP
WBC # BLD: 3.46 K/UL — LOW (ref 3.8–10.5)
WBC # FLD AUTO: 3.46 K/UL — LOW (ref 3.8–10.5)

## 2022-06-20 PROCEDURE — 72148 MRI LUMBAR SPINE W/O DYE: CPT | Mod: 26

## 2022-06-20 PROCEDURE — 99232 SBSQ HOSP IP/OBS MODERATE 35: CPT | Mod: GC

## 2022-06-20 RX ORDER — INSULIN GLARGINE 100 [IU]/ML
10 INJECTION, SOLUTION SUBCUTANEOUS AT BEDTIME
Refills: 0 | Status: DISCONTINUED | OUTPATIENT
Start: 2022-06-20 | End: 2022-06-21

## 2022-06-20 RX ORDER — INSULIN LISPRO 100/ML
8 VIAL (ML) SUBCUTANEOUS
Refills: 0 | Status: DISCONTINUED | OUTPATIENT
Start: 2022-06-20 | End: 2022-06-21

## 2022-06-20 RX ORDER — POLYETHYLENE GLYCOL 3350 17 G/17G
17 POWDER, FOR SOLUTION ORAL DAILY
Refills: 0 | Status: DISCONTINUED | OUTPATIENT
Start: 2022-06-20 | End: 2022-06-29

## 2022-06-20 RX ORDER — ALPRAZOLAM 0.25 MG
0.5 TABLET ORAL ONCE
Refills: 0 | Status: DISCONTINUED | OUTPATIENT
Start: 2022-06-20 | End: 2022-06-20

## 2022-06-20 RX ADMIN — LIDOCAINE 1 PATCH: 4 CREAM TOPICAL at 00:51

## 2022-06-20 RX ADMIN — ATORVASTATIN CALCIUM 80 MILLIGRAM(S): 80 TABLET, FILM COATED ORAL at 22:02

## 2022-06-20 RX ADMIN — Medication 1 GRAM(S): at 17:38

## 2022-06-20 RX ADMIN — SERTRALINE 100 MILLIGRAM(S): 25 TABLET, FILM COATED ORAL at 13:02

## 2022-06-20 RX ADMIN — Medication 0.5 MILLIGRAM(S): at 15:18

## 2022-06-20 RX ADMIN — Medication 8 UNIT(S): at 17:37

## 2022-06-20 RX ADMIN — Medication 1 MILLIGRAM(S): at 13:02

## 2022-06-20 RX ADMIN — HEPARIN SODIUM 5000 UNIT(S): 5000 INJECTION INTRAVENOUS; SUBCUTANEOUS at 17:38

## 2022-06-20 RX ADMIN — POLYETHYLENE GLYCOL 3350 17 GRAM(S): 17 POWDER, FOR SOLUTION ORAL at 13:03

## 2022-06-20 RX ADMIN — Medication 6 UNIT(S): at 09:07

## 2022-06-20 RX ADMIN — INSULIN GLARGINE 10 UNIT(S): 100 INJECTION, SOLUTION SUBCUTANEOUS at 22:03

## 2022-06-20 RX ADMIN — Medication 5 MILLIGRAM(S): at 05:30

## 2022-06-20 RX ADMIN — EVEROLIMUS 1 MILLIGRAM(S): 10 TABLET ORAL at 09:08

## 2022-06-20 RX ADMIN — LIDOCAINE 1 PATCH: 4 CREAM TOPICAL at 13:01

## 2022-06-20 RX ADMIN — Medication 2: at 17:38

## 2022-06-20 RX ADMIN — HEPARIN SODIUM 5000 UNIT(S): 5000 INJECTION INTRAVENOUS; SUBCUTANEOUS at 05:30

## 2022-06-20 RX ADMIN — MYCOPHENOLATE MOFETIL 250 MILLIGRAM(S): 250 CAPSULE ORAL at 05:30

## 2022-06-20 RX ADMIN — TRAMADOL HYDROCHLORIDE 25 MILLIGRAM(S): 50 TABLET ORAL at 09:43

## 2022-06-20 RX ADMIN — MYCOPHENOLATE MOFETIL 250 MILLIGRAM(S): 250 CAPSULE ORAL at 17:38

## 2022-06-20 RX ADMIN — Medication 8 UNIT(S): at 12:59

## 2022-06-20 RX ADMIN — TAMSULOSIN HYDROCHLORIDE 0.4 MILLIGRAM(S): 0.4 CAPSULE ORAL at 22:03

## 2022-06-20 RX ADMIN — TRAMADOL HYDROCHLORIDE 25 MILLIGRAM(S): 50 TABLET ORAL at 10:14

## 2022-06-20 RX ADMIN — Medication 1 GRAM(S): at 05:29

## 2022-06-20 RX ADMIN — Medication 6: at 13:00

## 2022-06-20 RX ADMIN — EVEROLIMUS 4 MILLIGRAM(S): 10 TABLET ORAL at 20:11

## 2022-06-20 RX ADMIN — Medication 6: at 09:07

## 2022-06-20 NOTE — PROGRESS NOTE ADULT - SUBJECTIVE AND OBJECTIVE BOX
Transplant Surgery - Multidisciplinary Rounds  --------------------------------------------------------------  OLT 7/2/2020    Present: Patient seen and examined with multidisciplinary Transplant team including  Surgeon: Dr. Dagher, Dr. Gray. Hepatologist: Dr. Lee, transplant pharmacist ANDRZEJ Blanca. PGY 3 Dr. Delgado. NP Patrick  unit RNs during AM rounds.   Disciplines not in attendance will be notified of the plan.     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    s/p OLT 7/2/20 (course c/b VRE bacteremia, L foot OM, CNI toxicity to both Tacrolimus and Cyclosporine; switched to Everolimus 7/27/20    Admitted from 12/20/21 to 1/6/22 s/p fall at home, no LOC or injuries.  - Found to be +Covid w/ CT chest findings concerning for beginnings of multifocal PNA. Received remdesivir and mononclonal antibodies and on dexamethasone (12/29-1/7).   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 PCP prophylaxis changed from bactrim to atovaquone for LFTs.   -Patient was found to have DVT on L femoral vein and started on full treatment AC, now on Eliquis 5mg BID.     ------------  Transferred to [facility] from [facility] on 6/16/2022 where he was admitted for multiple falls over 24hr period.  +HA, +neck pain, +L rib pain.  Imaging revealed:  -C5-C7 mod-severe spinal stenosis  -L rib fx  -5mm distal L ureteral stone  -diarrhea    Interval Events:  -afebrile, VSS  -c/o tooth discomfort (has cap) limiting PO seen by dentist no infection  -baseline independent post-txp, occasionally requiring walker.  in-house, pt with continued b/l LE weakness limiting mobility  -Seen by Neuro rec MRI T&L, and labs  -neck and L rib pain, somewhat controlled  -tolerating PO        Imunnosupression:  Everolimus 4mg BID fu daily level, MMF 250mg bid (due to GI symptoms), pred 5QD  Potential Discharge date: pending clinical stability  Education:  Medications  Plan of care:  See Below          MEDICATIONS  (STANDING):  atorvastatin 80 milliGRAM(s) Oral at bedtime  dextrose 5%. 1000 milliLiter(s) (100 mL/Hr) IV Continuous <Continuous>  dextrose 5%. 1000 milliLiter(s) (50 mL/Hr) IV Continuous <Continuous>  dextrose 50% Injectable 25 Gram(s) IV Push once  dextrose 50% Injectable 12.5 Gram(s) IV Push once  dextrose 50% Injectable 25 Gram(s) IV Push once  everolimus (ZORTRESS) 4 milliGRAM(s) Oral <User Schedule>  folic acid 1 milliGRAM(s) Oral daily  glucagon  Injectable 1 milliGRAM(s) IntraMuscular once  heparin   Injectable 5000 Unit(s) SubCutaneous every 12 hours  insulin glargine Injectable (LANTUS) 10 Unit(s) SubCutaneous at bedtime  insulin lispro (ADMELOG) corrective regimen sliding scale   SubCutaneous three times a day before meals  insulin lispro (ADMELOG) corrective regimen sliding scale   SubCutaneous at bedtime  insulin lispro Injectable (ADMELOG) 8 Unit(s) SubCutaneous three times a day before meals  lidocaine   4% Patch 1 Patch Transdermal daily  lidocaine   4% Patch 1 Patch Transdermal daily  mycophenolate mofetil 250 milliGRAM(s) Oral two times a day  omega-3-Acid Ethyl Esters 1 Gram(s) Oral two times a day  pantoprazole    Tablet 40 milliGRAM(s) Oral before breakfast  polyethylene glycol 3350 17 Gram(s) Oral daily  predniSONE   Tablet 5 milliGRAM(s) Oral daily  sertraline 100 milliGRAM(s) Oral daily  tamsulosin 0.4 milliGRAM(s) Oral at bedtime    MEDICATIONS  (PRN):  artificial  tears Solution 1 Drop(s) Both EYES four times a day PRN Dry Eyes  dextrose Oral Gel 15 Gram(s) Oral once PRN Blood Glucose LESS THAN 70 milliGRAM(s)/deciliter  traMADol 25 milliGRAM(s) Oral every 12 hours PRN Severe Pain (7 - 10)      PAST MEDICAL & SURGICAL HISTORY:  Diabetes      Hypercholesteremia      Neuropathy      Hypertension      Renal stones  25 years ago      Fatty liver disease, nonalcoholic      Obesity      Hepatic encephalopathy      GERD with esophagitis  Gastritis &amp; Non Bleeding Ulcers      GIB (gastrointestinal bleeding)      S/P cholecystectomy          Vital Signs Last 24 Hrs  T(C): 36.8 (20 Jun 2022 09:02), Max: 36.9 (19 Jun 2022 13:00)  T(F): 98.2 (20 Jun 2022 09:02), Max: 98.5 (19 Jun 2022 21:10)  HR: 75 (20 Jun 2022 09:02) (75 - 87)  BP: 154/71 (20 Jun 2022 09:02) (146/76 - 161/75)  BP(mean): --  RR: 18 (20 Jun 2022 09:02) (18 - 18)  SpO2: 94% (20 Jun 2022 09:02) (94% - 97%)    I&O's Summary    19 Jun 2022 07:01  -  20 Jun 2022 07:00  --------------------------------------------------------  IN: 1940 mL / OUT: 3100 mL / NET: -1160 mL                              9.1    3.46  )-----------( 144      ( 20 Jun 2022 06:53 )             28.7     06-20    138  |  107  |  37<H>  ----------------------------<  294<H>  4.5   |  21<L>  |  1.91<H>    Ca    8.9      20 Jun 2022 06:51  Phos  2.8     06-20  Mg     2.0     06-20    TPro  6.4  /  Alb  3.4  /  TBili  0.2  /  DBili  x   /  AST  69<H>  /  ALT  124<H>  /  AlkPhos  121<H>  06-20          Culture - Blood (collected 06-16-22 @ 17:21)  Source: .Blood Blood  Preliminary Report (06-17-22 @ 23:02):    No growth to date.    Culture - Blood (collected 06-16-22 @ 17:21)  Source: .Blood Blood  Preliminary Report (06-17-22 @ 23:02):    No growth to date.    Culture - Urine (collected 06-16-22 @ 10:07)  Source: Clean Catch Clean Catch (Midstream)  Final Report (06-17-22 @ 14:27):    <10,000 CFU/mL Normal Urogenital Shantel    Culture - Urine (collected 06-16-22 @ 00:52)  Source: Clean Catch Clean Catch (Midstream)  Final Report (06-16-22 @ 23:14):    <10,000 CFU/mL Normal Urogenital Shantel                 Review of systems  Gen: No weight changes, fatigue, fevers/chills, see hpi  Skin: No rashes  Head/Eyes/Ears/Mouth: No headache; Normal hearing; Normal vision w/o blurriness; No sinus pain/discomfort, sore throat  Respiratory: No dyspnea, cough, wheezing, hemoptysis  CV: No chest pain, PND, orthopnea  GI:  no diarrhea, constipation, nausea, vomiting, melena, hematochezia  : No increased frequency, dysuria, hematuria, nocturia  MSK: see hpi, no edema  Neuro: No dizziness/lightheadedness, weakness, seizures, numbness, tingling  Heme: No easy bruising or bleeding  Endo: No heat/cold intolerance  Psych: No significant nervousness, anxiety, stress, depression  All other systems were reviewed and are negative, except as noted.      PHYSICAL EXAM:  Constitutional: Well developed / well nourished  Eyes: anicteric, PERRLA  ENMT: nc/at, no thrush, loose front tooth  Neck: supple  Respiratory: CTA B/L  Cardiovascular: RRR.  Gastrointestinal: Soft abdomen, ND/NT. well healed incisional scar  Genitourinary: Voiding spontaneously  Extremities: SCD's in place and working bilaterally, no calf TTP. no edema. L rib pain to palpation  Vascular: Palpable dp pulses bilaterally.   Neurological: A&O x3  Skin: no rashes, ulcerations, lesions  Musculoskeletal: Moving all extremities, decreased strength throughout  Psychiatric: Responsive

## 2022-06-20 NOTE — PROGRESS NOTE ADULT - ASSESSMENT
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    s/p OLT 7/2/20 (course c/b VRE bacteremia, L Foot OM, CNI toxicity to both Tacrolimus and Cyclosporine; switched to Everolimus 7/27/20).  Recent syncope and fall, COVID all in 12/2022.  Transferred s/p fall from [place]    s/p fall   -CT neck with mod-severe spinal stenosis with associated pain. Neuro/NeuroSx teams consulted, appreciate input  -MRI neck non-con with mod canal stenosis  - Neurology recommendations noted MRI T&L ordered   - neurosurgery (spine) consulted no sx intervention at this time rec PT/OT  -no cardiac concerns at this time  -Dental consult appreciated Rec;SL: No signs of acute odontogenic infection, fu outpt for comprehensive dental care    L ureteral stone with hydronephrosis  -Appreciate  input  -continue PO/IV hydration and Flomax in hopes stone will pass  -Renal U/S with no acute stone, mild hydro   -mild MISA on arrival, Cr trending down    s/p OLT  -good graft function  -SQH/SCDs    Immunosuppression  -continue Everolimus per level, MMF  250BID, Pred 5  -PPx: Valcyte (recent low level viremia, Transplant ID to review), PPI    DM:  - BG elevated in the high 200's  - Increase Lantus to 10Units and premeal to 8untis  - Continue ISS  - Diabetic diet    Dispo:  - PT recs for [facility] when medically cleared (pt refused [facility] for now wants to be DC home with home PT)

## 2022-06-20 NOTE — PROGRESS NOTE ADULT - SUBJECTIVE AND OBJECTIVE BOX
Interval Events:   NAEON, afebrile HD stable  Doing well, denies complaints  Does not prefer to go to rehab at this time  amenable to MRI spine with sedation    ROS:   12 point review of systems performed and negative except otherwise noted in HPI.    Hospital Medications:  atorvastatin 80 milliGRAM(s) Oral at bedtime  dextrose 5%. 1000 milliLiter(s) IV Continuous <Continuous>  dextrose 5%. 1000 milliLiter(s) IV Continuous <Continuous>  dextrose 50% Injectable 25 Gram(s) IV Push once  dextrose 50% Injectable 12.5 Gram(s) IV Push once  dextrose 50% Injectable 25 Gram(s) IV Push once  dextrose Oral Gel 15 Gram(s) Oral once PRN  everolimus (ZORTRESS) 4 milliGRAM(s) Oral <User Schedule>  folic acid 1 milliGRAM(s) Oral daily  glucagon  Injectable 1 milliGRAM(s) IntraMuscular once  heparin   Injectable 5000 Unit(s) SubCutaneous every 12 hours  insulin glargine Injectable (LANTUS) 8 Unit(s) SubCutaneous at bedtime  insulin lispro (ADMELOG) corrective regimen sliding scale   SubCutaneous three times a day before meals  insulin lispro (ADMELOG) corrective regimen sliding scale   SubCutaneous at bedtime  insulin lispro Injectable (ADMELOG) 6 Unit(s) SubCutaneous three times a day before meals  lidocaine   4% Patch 1 Patch Transdermal daily  lidocaine   4% Patch 1 Patch Transdermal daily  mycophenolate mofetil 250 milliGRAM(s) Oral two times a day  omega-3-Acid Ethyl Esters 1 Gram(s) Oral two times a day  pantoprazole    Tablet 40 milliGRAM(s) Oral before breakfast  predniSONE   Tablet 5 milliGRAM(s) Oral daily  sertraline 100 milliGRAM(s) Oral daily  sodium chloride 0.9%. 1000 milliLiter(s) IV Continuous <Continuous>  tamsulosin 0.4 milliGRAM(s) Oral at bedtime  traMADol 25 milliGRAM(s) Oral every 12 hours PRN      PHYSICAL EXAM:   Vital Signs Last 24 Hrs  T(C): 36.8 (20 Jun 2022 09:02), Max: 36.9 (19 Jun 2022 13:00)  T(F): 98.2 (20 Jun 2022 09:02), Max: 98.5 (19 Jun 2022 21:10)  HR: 75 (20 Jun 2022 09:02) (75 - 87)  BP: 154/71 (20 Jun 2022 09:02) (146/76 - 161/75)  BP(mean): --  RR: 18 (20 Jun 2022 09:02) (18 - 18)  SpO2: 94% (20 Jun 2022 09:02) (94% - 97%)    GENERAL: no acute distress  NEURO: alert  HEENT: NCAT, no conjunctival pallor appreciated  CHEST: no respiratory distress, no accessory muscle use  CARDIAC: regular rate, +S1/S2  ABDOMEN: soft, nontender, no rebound or guarding  EXTREMITIES: warm, well perfused  SKIN: no lesions noted    LABS:                        9.1    3.46  )-----------( 144      ( 20 Jun 2022 06:53 )             28.7     06-20    138  |  107  |  37<H>  ----------------------------<  294<H>  4.5   |  21<L>  |  1.91<H>    Ca    8.9      20 Jun 2022 06:51  Phos  2.8     06-20  Mg     2.0     06-20    TPro  6.4  /  Alb  3.4  /  TBili  0.2  /  DBili  x   /  AST  69<H>  /  ALT  124<H>  /  AlkPhos  121<H>  06-20    LIVER FUNCTIONS - ( 20 Jun 2022 06:51 )  Alb: 3.4 g/dL / Pro: 6.4 g/dL / ALK PHOS: 121 U/L / ALT: 124 U/L / AST: 69 U/L / GGT: x                     Interval Diagnostic Studies: see sunrise for full report

## 2022-06-20 NOTE — PROGRESS NOTE ADULT - SUBJECTIVE AND OBJECTIVE BOX
[facility]  Ramón Falk PA-C  formerly [facility] [place] [address]  [phone]      INTERVAL HPI/OVERNIGHT EVENTS:  pt seen and examined, no new events  tolerating diet    MEDICATIONS  (STANDING):  atorvastatin 80 milliGRAM(s) Oral at bedtime  dextrose 5%. 1000 milliLiter(s) (100 mL/Hr) IV Continuous <Continuous>  dextrose 5%. 1000 milliLiter(s) (50 mL/Hr) IV Continuous <Continuous>  dextrose 50% Injectable 25 Gram(s) IV Push once  dextrose 50% Injectable 12.5 Gram(s) IV Push once  dextrose 50% Injectable 25 Gram(s) IV Push once  everolimus (ZORTRESS) 4 milliGRAM(s) Oral <User Schedule>  folic acid 1 milliGRAM(s) Oral daily  glucagon  Injectable 1 milliGRAM(s) IntraMuscular once  heparin   Injectable 5000 Unit(s) SubCutaneous every 12 hours  insulin glargine Injectable (LANTUS) 8 Unit(s) SubCutaneous at bedtime  insulin lispro (ADMELOG) corrective regimen sliding scale   SubCutaneous three times a day before meals  insulin lispro (ADMELOG) corrective regimen sliding scale   SubCutaneous at bedtime  insulin lispro Injectable (ADMELOG) 6 Unit(s) SubCutaneous three times a day before meals  lidocaine   4% Patch 1 Patch Transdermal daily  lidocaine   4% Patch 1 Patch Transdermal daily  mycophenolate mofetil 250 milliGRAM(s) Oral two times a day  omega-3-Acid Ethyl Esters 1 Gram(s) Oral two times a day  pantoprazole    Tablet 40 milliGRAM(s) Oral before breakfast  polyethylene glycol 3350 17 Gram(s) Oral once  predniSONE   Tablet 5 milliGRAM(s) Oral daily  sertraline 100 milliGRAM(s) Oral daily  sodium chloride 0.9%. 1000 milliLiter(s) (50 mL/Hr) IV Continuous <Continuous>  tamsulosin 0.4 milliGRAM(s) Oral at bedtime    MEDICATIONS  (PRN):  dextrose Oral Gel 15 Gram(s) Oral once PRN Blood Glucose LESS THAN 70 milliGRAM(s)/deciliter  traMADol 25 milliGRAM(s) Oral every 12 hours PRN Severe Pain (7 - 10)      Allergies    codeine (Anaphylaxis)    Intolerances        ROS:   General:  No wt loss, fevers, chills, night sweats, fatigue,   Eyes:  Good vision, no reported pain  ENT:  No sore throat, pain, runny nose, dysphagia  CV:  No pain, palpitations, hypo/hypertension  Resp:  No dyspnea, cough, tachypnea, wheezing  GI:  No pain, No nausea, No vomiting, No diarrhea, No constipation, No weight loss, No fever, No pruritis, No rectal bleeding, No tarry stools, No dysphagia,  :  No pain, bleeding, incontinence, nocturia  Muscle:  No pain, weakness  Neuro:  No weakness, tingling, memory problems  Psych:  No fatigue, insomnia, mood problems, depression  Endocrine:  No polyuria, polydipsia, cold/heat intolerance  Heme:  No petechiae, ecchymosis, easy bruisability  Skin:  No rash, tattoos, scars, edema      PHYSICAL EXAM:   Vital Signs Last 24 Hrs  T(C): 36.8 (2022 09:02), Max: 36.9 (2022 13:00)  T(F): 98.2 (2022 09:02), Max: 98.5 (2022 21:10)  HR: 75 (2022 09:02) (75 - 87)  BP: 154/71 (2022 09:02) (146/76 - 161/75)  BP(mean): --  RR: 18 (2022 09:02) (18 - 18)  SpO2: 94% (2022 09:02) (94% - 97%)  Daily     Daily Weight in k.5 (2022 21:06)    GENERAL:  Appears stated age,   HEENT:  NC/AT,    CHEST:  Full & symmetric excursion,   HEART:  Regular rhythm,  ABDOMEN:  Soft, non-tender, non-distended,  EXTEREMITIES:  no cyanosis  SKIN:  No rash  NEURO:  Alert,       LABS:                        9.1    3.46  )-----------( 144      ( 2022 06:53 )             28.7   06-20    138  |  107  |  37<H>  ----------------------------<  294<H>  4.5   |  21<L>  |  1.91<H>    Ca    8.9      2022 06:51  Phos  2.8       Mg     2.0         TPro  6.4  /  Alb  3.4  /  TBili  0.2  /  DBili  x   /  AST  69<H>  /  ALT  124<H>  /  AlkPhos  121<H>          RADIOLOGY & ADDITIONAL TESTS:

## 2022-06-20 NOTE — PROGRESS NOTE ADULT - ASSESSMENT
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     Impression:   #Fall, rib fracture: now second admission for falls c/b rib fracture. Per neurologic evaluation possibly 2/2 spinal canal stenosis     #Ureteral stone c/b hydronephrosis, stable/improved  #Elevated liver enzymes: liver biopsy 12/30/21 without e/o rejection, found to have NRH  #JEAN cirrhosis s/p LT  #CMV viremia   #DM  #Anemia    Recommendations:  - neuro evaluation re: recurrent falls, stating likely 2/2 spinal canal stenosis however NSGY with no plans for surgical intervention  - renal US reveals stable-improved left hydronephrosis  - evaluation of ureteral stone/management per urology   - immunosuppression: everolimus 4 mg BID (dose by level), and CellCept 250 mg BID, and prednisone 5mg daily  - d/c valcyte per ID   - will need outpatient neurology follow up         Recommendations incomplete until finalized by attending signature/attestation to note.    Thank you for involving us in the care of this patient, please reach out if any further questions.     Ky Ram MD  Gastroenterology/Hepatology Fellow, PGY5    Available on [employer] Teams  [phone] ([facility])  [phone] ([facility])  Please contact on call fellow weekdays after 5pm-7am and weekends: [phone]

## 2022-06-21 LAB
ALBUMIN SERPL ELPH-MCNC: 3.4 G/DL — SIGNIFICANT CHANGE UP (ref 3.3–5)
ALP SERPL-CCNC: 120 U/L — SIGNIFICANT CHANGE UP (ref 40–120)
ALT FLD-CCNC: 152 U/L — HIGH (ref 10–45)
ANION GAP SERPL CALC-SCNC: 10 MMOL/L — SIGNIFICANT CHANGE UP (ref 5–17)
AST SERPL-CCNC: 84 U/L — HIGH (ref 10–40)
BASOPHILS # BLD AUTO: 0.03 K/UL — SIGNIFICANT CHANGE UP (ref 0–0.2)
BASOPHILS NFR BLD AUTO: 0.8 % — SIGNIFICANT CHANGE UP (ref 0–2)
BILIRUB SERPL-MCNC: 0.2 MG/DL — SIGNIFICANT CHANGE UP (ref 0.2–1.2)
BUN SERPL-MCNC: 39 MG/DL — HIGH (ref 7–23)
CALCIUM SERPL-MCNC: 8.9 MG/DL — SIGNIFICANT CHANGE UP (ref 8.4–10.5)
CHLORIDE SERPL-SCNC: 107 MMOL/L — SIGNIFICANT CHANGE UP (ref 96–108)
CO2 SERPL-SCNC: 22 MMOL/L — SIGNIFICANT CHANGE UP (ref 22–31)
COPPER SERPL-MCNC: 116 UG/DL — SIGNIFICANT CHANGE UP (ref 69–132)
CREAT SERPL-MCNC: 1.86 MG/DL — HIGH (ref 0.5–1.3)
CULTURE RESULTS: SIGNIFICANT CHANGE UP
CULTURE RESULTS: SIGNIFICANT CHANGE UP
EGFR: 39 ML/MIN/1.73M2 — LOW
EOSINOPHIL # BLD AUTO: 0.07 K/UL — SIGNIFICANT CHANGE UP (ref 0–0.5)
EOSINOPHIL NFR BLD AUTO: 1.9 % — SIGNIFICANT CHANGE UP (ref 0–6)
EVEROLIMUS, WHOLE BLOOD RESULT: 5.8 NG/ML — SIGNIFICANT CHANGE UP (ref 3–8)
GLUCOSE BLDC GLUCOMTR-MCNC: 163 MG/DL — HIGH (ref 70–99)
GLUCOSE BLDC GLUCOMTR-MCNC: 229 MG/DL — HIGH (ref 70–99)
GLUCOSE BLDC GLUCOMTR-MCNC: 246 MG/DL — HIGH (ref 70–99)
GLUCOSE BLDC GLUCOMTR-MCNC: 264 MG/DL — HIGH (ref 70–99)
GLUCOSE SERPL-MCNC: 200 MG/DL — HIGH (ref 70–99)
HCT VFR BLD CALC: 28.1 % — LOW (ref 39–50)
HGB BLD-MCNC: 8.9 G/DL — LOW (ref 13–17)
IMM GRANULOCYTES NFR BLD AUTO: 0.8 % — SIGNIFICANT CHANGE UP (ref 0–1.5)
LYMPHOCYTES # BLD AUTO: 1 K/UL — SIGNIFICANT CHANGE UP (ref 1–3.3)
LYMPHOCYTES # BLD AUTO: 26.8 % — SIGNIFICANT CHANGE UP (ref 13–44)
MAGNESIUM SERPL-MCNC: 1.8 MG/DL — SIGNIFICANT CHANGE UP (ref 1.6–2.6)
MCHC RBC-ENTMCNC: 29.2 PG — SIGNIFICANT CHANGE UP (ref 27–34)
MCHC RBC-ENTMCNC: 31.7 GM/DL — LOW (ref 32–36)
MCV RBC AUTO: 92.1 FL — SIGNIFICANT CHANGE UP (ref 80–100)
MONOCYTES # BLD AUTO: 0.34 K/UL — SIGNIFICANT CHANGE UP (ref 0–0.9)
MONOCYTES NFR BLD AUTO: 9.1 % — SIGNIFICANT CHANGE UP (ref 2–14)
NEUTROPHILS # BLD AUTO: 2.26 K/UL — SIGNIFICANT CHANGE UP (ref 1.8–7.4)
NEUTROPHILS NFR BLD AUTO: 60.6 % — SIGNIFICANT CHANGE UP (ref 43–77)
NRBC # BLD: 0 /100 WBCS — SIGNIFICANT CHANGE UP (ref 0–0)
PHOSPHATE SERPL-MCNC: 3.4 MG/DL — SIGNIFICANT CHANGE UP (ref 2.5–4.5)
PLATELET # BLD AUTO: 145 K/UL — LOW (ref 150–400)
POTASSIUM SERPL-MCNC: 4.6 MMOL/L — SIGNIFICANT CHANGE UP (ref 3.5–5.3)
POTASSIUM SERPL-SCNC: 4.6 MMOL/L — SIGNIFICANT CHANGE UP (ref 3.5–5.3)
PROT SERPL-MCNC: 6.5 G/DL — SIGNIFICANT CHANGE UP (ref 6–8.3)
RBC # BLD: 3.05 M/UL — LOW (ref 4.2–5.8)
RBC # FLD: 13.2 % — SIGNIFICANT CHANGE UP (ref 10.3–14.5)
SODIUM SERPL-SCNC: 139 MMOL/L — SIGNIFICANT CHANGE UP (ref 135–145)
SPECIMEN SOURCE: SIGNIFICANT CHANGE UP
SPECIMEN SOURCE: SIGNIFICANT CHANGE UP
WBC # BLD: 3.73 K/UL — LOW (ref 3.8–10.5)
WBC # FLD AUTO: 3.73 K/UL — LOW (ref 3.8–10.5)

## 2022-06-21 PROCEDURE — 99232 SBSQ HOSP IP/OBS MODERATE 35: CPT | Mod: GC

## 2022-06-21 RX ORDER — ALPRAZOLAM 0.25 MG
0.5 TABLET ORAL ONCE
Refills: 0 | Status: DISCONTINUED | OUTPATIENT
Start: 2022-06-21 | End: 2022-06-21

## 2022-06-21 RX ORDER — INSULIN GLARGINE 100 [IU]/ML
12 INJECTION, SOLUTION SUBCUTANEOUS AT BEDTIME
Refills: 0 | Status: ACTIVE | OUTPATIENT
Start: 2022-06-21 | End: 2023-01-01

## 2022-06-21 RX ORDER — INSULIN LISPRO 100/ML
10 VIAL (ML) SUBCUTANEOUS
Refills: 0 | Status: DISCONTINUED | OUTPATIENT
Start: 2022-06-21 | End: 2022-06-29

## 2022-06-21 RX ADMIN — Medication 1 MILLIGRAM(S): at 11:38

## 2022-06-21 RX ADMIN — EVEROLIMUS 4 MILLIGRAM(S): 10 TABLET ORAL at 08:59

## 2022-06-21 RX ADMIN — Medication 8 UNIT(S): at 08:57

## 2022-06-21 RX ADMIN — TRAMADOL HYDROCHLORIDE 25 MILLIGRAM(S): 50 TABLET ORAL at 18:03

## 2022-06-21 RX ADMIN — PANTOPRAZOLE SODIUM 40 MILLIGRAM(S): 20 TABLET, DELAYED RELEASE ORAL at 05:43

## 2022-06-21 RX ADMIN — Medication 5 MILLIGRAM(S): at 05:43

## 2022-06-21 RX ADMIN — LIDOCAINE 1 PATCH: 4 CREAM TOPICAL at 18:58

## 2022-06-21 RX ADMIN — HEPARIN SODIUM 5000 UNIT(S): 5000 INJECTION INTRAVENOUS; SUBCUTANEOUS at 17:26

## 2022-06-21 RX ADMIN — Medication 4: at 08:57

## 2022-06-21 RX ADMIN — Medication 8 UNIT(S): at 13:01

## 2022-06-21 RX ADMIN — Medication 1 GRAM(S): at 05:45

## 2022-06-21 RX ADMIN — LIDOCAINE 1 PATCH: 4 CREAM TOPICAL at 11:38

## 2022-06-21 RX ADMIN — SERTRALINE 100 MILLIGRAM(S): 25 TABLET, FILM COATED ORAL at 11:38

## 2022-06-21 RX ADMIN — TAMSULOSIN HYDROCHLORIDE 0.4 MILLIGRAM(S): 0.4 CAPSULE ORAL at 21:37

## 2022-06-21 RX ADMIN — MYCOPHENOLATE MOFETIL 250 MILLIGRAM(S): 250 CAPSULE ORAL at 17:25

## 2022-06-21 RX ADMIN — HEPARIN SODIUM 5000 UNIT(S): 5000 INJECTION INTRAVENOUS; SUBCUTANEOUS at 05:44

## 2022-06-21 RX ADMIN — TRAMADOL HYDROCHLORIDE 25 MILLIGRAM(S): 50 TABLET ORAL at 18:59

## 2022-06-21 RX ADMIN — Medication 10 UNIT(S): at 17:57

## 2022-06-21 RX ADMIN — LIDOCAINE 1 PATCH: 4 CREAM TOPICAL at 11:37

## 2022-06-21 RX ADMIN — INSULIN GLARGINE 12 UNIT(S): 100 INJECTION, SOLUTION SUBCUTANEOUS at 21:38

## 2022-06-21 RX ADMIN — Medication 6: at 13:01

## 2022-06-21 RX ADMIN — EVEROLIMUS 4 MILLIGRAM(S): 10 TABLET ORAL at 20:17

## 2022-06-21 RX ADMIN — Medication 4: at 17:58

## 2022-06-21 RX ADMIN — Medication 1 GRAM(S): at 17:25

## 2022-06-21 RX ADMIN — MYCOPHENOLATE MOFETIL 250 MILLIGRAM(S): 250 CAPSULE ORAL at 05:43

## 2022-06-21 NOTE — PROGRESS NOTE ADULT - SUBJECTIVE AND OBJECTIVE BOX
Transplant Surgery - Multidisciplinary Rounds  --------------------------------------------------------------  OLT 7/2/2020    Present: Patient seen and examined with multidisciplinary Transplant team including  Surgeon: Dr. Gray. Hepatologist: Dr. Lee, transplant pharmacist ANDRZEJ Blanca, ZBIGNIEW Hurley and unit RNs during AM rounds.   Disciplines not in attendance will be notified of the plan.     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    s/p OLT 7/2/20 (course c/b VRE bacteremia, L foot OM, CNI toxicity to both Tacrolimus and Cyclosporine; switched to Everolimus 7/27/20    Admitted from 12/20/21 to 1/6/22 s/p fall at home, no LOC or injuries.  - Found to be +Covid w/ CT chest findings concerning for beginnings of multifocal PNA. Received remdesivir and mononclonal antibodies and on dexamethasone (12/29-1/7).   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 PCP prophylaxis changed from bactrim to atovaquone for LFTs.   -Patient was found to have DVT on L femoral vein and started on full treatment AC, now on Eliquis 5mg BID.     ------------  Transferred to [facility] from [facility] on 6/16/2022 where he was admitted for multiple falls over 24hr period.  +HA, +neck pain, +L rib pain.  Imaging revealed:  -C5-C7 mod-severe spinal stenosis  -L rib fx  -5mm distal L ureteral stone  -diarrhea    Interval Events:  - Afebrile, VSS  - Baseline independent post-txp, occasionally requiring walker.  in-house, pt with continued b/l LE weakness limiting mobility. Wants to go home as opposed to SANTANA  - MRI lumbar done. Unable to tolerate thoracic MRI due to claustrophobia. Rescheduled  - Neck and L rib pain, somewhat controlled  - Hyperglycemia: Insulin regimen adjusted    Immunosuppression:  Everolimus 4mg BID, MMF 250mg bid (due to GI symptoms), pred 5QD  Potential Discharge date: pending clinical stability  Education:  Medications  Plan of care:  See Below          MEDICATIONS  (STANDING):  ALPRAZolam 0.5 milliGRAM(s) Oral once  dextrose 5%. 1000 milliLiter(s) (100 mL/Hr) IV Continuous <Continuous>  dextrose 5%. 1000 milliLiter(s) (50 mL/Hr) IV Continuous <Continuous>  dextrose 50% Injectable 25 Gram(s) IV Push once  dextrose 50% Injectable 12.5 Gram(s) IV Push once  dextrose 50% Injectable 25 Gram(s) IV Push once  everolimus (ZORTRESS) 4 milliGRAM(s) Oral <User Schedule>  folic acid 1 milliGRAM(s) Oral daily  glucagon  Injectable 1 milliGRAM(s) IntraMuscular once  heparin   Injectable 5000 Unit(s) SubCutaneous every 12 hours  insulin glargine Injectable (LANTUS) 10 Unit(s) SubCutaneous at bedtime  insulin lispro (ADMELOG) corrective regimen sliding scale   SubCutaneous three times a day before meals  insulin lispro (ADMELOG) corrective regimen sliding scale   SubCutaneous at bedtime  insulin lispro Injectable (ADMELOG) 8 Unit(s) SubCutaneous three times a day before meals  lidocaine   4% Patch 1 Patch Transdermal daily  lidocaine   4% Patch 1 Patch Transdermal daily  mycophenolate mofetil 250 milliGRAM(s) Oral two times a day  omega-3-Acid Ethyl Esters 1 Gram(s) Oral two times a day  pantoprazole    Tablet 40 milliGRAM(s) Oral before breakfast  polyethylene glycol 3350 17 Gram(s) Oral daily  predniSONE   Tablet 5 milliGRAM(s) Oral daily  sertraline 100 milliGRAM(s) Oral daily  tamsulosin 0.4 milliGRAM(s) Oral at bedtime    MEDICATIONS  (PRN):  artificial  tears Solution 1 Drop(s) Both EYES four times a day PRN Dry Eyes  dextrose Oral Gel 15 Gram(s) Oral once PRN Blood Glucose LESS THAN 70 milliGRAM(s)/deciliter  traMADol 25 milliGRAM(s) Oral every 12 hours PRN Severe Pain (7 - 10)      PAST MEDICAL & SURGICAL HISTORY:  Diabetes      Hypercholesteremia      Neuropathy      Hypertension      Renal stones  25 years ago      Fatty liver disease, nonalcoholic      Obesity      Hepatic encephalopathy      GERD with esophagitis  Gastritis &amp; Non Bleeding Ulcers      GIB (gastrointestinal bleeding)      S/P cholecystectomy          Vital Signs Last 24 Hrs  T(C): 36.8 (21 Jun 2022 13:22), Max: 37 (21 Jun 2022 05:22)  T(F): 98.2 (21 Jun 2022 13:22), Max: 98.6 (21 Jun 2022 05:22)  HR: 73 (21 Jun 2022 13:22) (71 - 74)  BP: 149/77 (21 Jun 2022 13:22) (131/66 - 149/77)  BP(mean): --  RR: 18 (21 Jun 2022 13:22) (18 - 18)  SpO2: 95% (21 Jun 2022 13:22) (95% - 96%)    I&O's Summary    20 Jun 2022 07:01  -  21 Jun 2022 07:00  --------------------------------------------------------  IN: 1160 mL / OUT: 2240 mL / NET: -1080 mL    21 Jun 2022 07:01  -  21 Jun 2022 15:53  --------------------------------------------------------  IN: 480 mL / OUT: 800 mL / NET: -320 mL                              8.9    3.73  )-----------( 145      ( 21 Jun 2022 06:21 )             28.1     06-21    139  |  107  |  39<H>  ----------------------------<  200<H>  4.6   |  22  |  1.86<H>    Ca    8.9      21 Jun 2022 06:21  Phos  3.4     06-21  Mg     1.8     06-21    TPro  6.5  /  Alb  3.4  /  TBili  0.2  /  DBili  x   /  AST  84<H>  /  ALT  152<H>  /  AlkPhos  120  06-21          Culture - Blood (collected 06-16-22 @ 17:21)  Source: .Blood Blood  Preliminary Report (06-17-22 @ 23:02):    No growth to date.    Culture - Blood (collected 06-16-22 @ 17:21)  Source: .Blood Blood  Preliminary Report (06-17-22 @ 23:02):    No growth to date.    Culture - Urine (collected 06-16-22 @ 10:07)  Source: Clean Catch Clean Catch (Midstream)  Final Report (06-17-22 @ 14:27):    <10,000 CFU/mL Normal Urogenital Shantel    Culture - Urine (collected 06-16-22 @ 00:52)  Source: Clean Catch Clean Catch (Midstream)  Final Report (06-16-22 @ 23:14):    <10,000 CFU/mL Normal Urogenital Shantel         Review of systems  Gen: No weight changes, fatigue, fevers/chills, see hpi  Skin: No rashes  Head/Eyes/Ears/Mouth: No headache; Normal hearing; Normal vision w/o blurriness; No sinus pain/discomfort, sore throat  Respiratory: No dyspnea, cough, wheezing, hemoptysis  CV: No chest pain, PND, orthopnea  GI:  no diarrhea, constipation, nausea, vomiting, melena, hematochezia  : No increased frequency, dysuria, hematuria, nocturia  MSK: see hpi, no edema  Neuro: No dizziness/lightheadedness, weakness, seizures, numbness, tingling  Heme: No easy bruising or bleeding  Endo: No heat/cold intolerance  Psych: No significant nervousness, anxiety, stress, depression  All other systems were reviewed and are negative, except as noted.      PHYSICAL EXAM:  Constitutional: Well developed / well nourished  Eyes: anicteric, PERRLA  ENMT: nc/at, no thrush, loose front tooth  Neck: supple  Respiratory: CTA B/L  Cardiovascular: RRR.  Gastrointestinal: Soft abdomen, ND/NT. well healed incisional scar  Genitourinary: Voiding spontaneously  Extremities: SCD's in place and working bilaterally, no calf TTP. no edema. L rib pain to palpation  Vascular: Palpable dp pulses bilaterally.   Neurological: A&O x3  Skin: no rashes, ulcerations, lesions  Musculoskeletal: Moving all extremities, decreased strength throughout  Psychiatric: Responsive

## 2022-06-21 NOTE — PROGRESS NOTE ADULT - ASSESSMENT
66M w/ PMHx JEAN cirrhosis s/p OLT 7/2/20 (course c/b VRE bacteremia, CNI toxicity to both tacro and cyclosporine; switched to everolimus 7/27/20), HLD, IDDM, obesity, HFpEF presenting w/ fall and ureteral stone to [facility] on 6/15/22, now transferred to [facility] for further management.     Impression:   #Fall, rib fracture: now second admission for falls c/b rib fracture. Per neurologic evaluation possibly 2/2 spinal canal stenosis     #Ureteral stone c/b hydronephrosis, stable/improved  #Elevated liver enzymes: liver biopsy 12/30/21 without e/o rejection, found to have NRH  #JEAN cirrhosis s/p LT  #CMV viremia   #DM  #Anemia    Recommendations:  - f/u MRI L spine  - appreciate neuro evaluation re: recurrent falls, stating likely 2/2 spinal canal stenosis  (NSGY with no plans for surgical intervention) vs may need EMG evaluation as an outpatient  - renal US reveals stable-improved left hydronephrosis  - evaluation of ureteral stone/management per urology   - immunosuppression: everolimus 4 mg BID (dose by level), and CellCept 250 mg BID, and prednisone 5mg daily  - d/c valcyte per ID   - will need outpatient neurology follow up         Recommendations incomplete until finalized by attending signature/attestation to note.    Thank you for involving us in the care of this patient, please reach out if any further questions.     Ky Ram MD  Gastroenterology/Hepatology Fellow, PGY5    Available on [employer] Teams  [phone] ([facility])  [phone] ([facility])  Please contact on call fellow weekdays after 5pm-7am and weekends: [phone]

## 2022-06-21 NOTE — CHART NOTE - NSCHARTNOTEFT_GEN_A_CORE
Neurology    Briefly, Patient RP is a 65yo M PMHx IDDM, HLD, obesity, HFpEF with mild LV diastolic dysfunction, MGUS, chronic anemia, DVT L fem vein on eliquis, with a history of duodenal ulcer as well as GAVE and duodenal AVM s/p APC (last on 10/11/19), decompensated JEAN/Cirrhosis (ascites/SBP/HE), UTI emphysematous cystitis (2/2020) and prostate abscess (3/2020) presents as transfer to [facility] from [facility] for multiple falls progressively worsening over the week but more so in 24hr period. Exam notable for diminished muscle bulk proximally of upper extremities, brisk reflexes UE, decreased motor str and sensation in LE, positive cross adductor.      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 Reviewed MR c spine and the MR L spine. Patient was unable to tolerate the MR T spine study. Notified by primary team for plan for pending discharge.     Recommendations:   [ ] Regarding falls workup, can pursue rest of workup in outpatient setting. Can be referred to ortho spine and neurology (Dr Viera: [id number] [phone]) for EMG/NCS within 1-2 weeks after discharge.    [ ] Given likely chronicity of pathology, low current clinical utility for steroids  [ ] PT/OT, pain management, fall precautions    Discussed above recommendations w/ neuro attending. If no further questions, neurology will sign off. If questions, please page. Patient called me and she is doing well after her right cataract surgery. She likes the lens I gave her for her left eye and wants an RX.  She is due to have cataract surgery OS in late February and wants a contact lens so she feels balanced with her surgic

## 2022-06-21 NOTE — PROGRESS NOTE ADULT - SUBJECTIVE AND OBJECTIVE BOX
Interval Events:   NAEON, afebrile HD stable  Doing well, denies complaints      ROS:   12 point review of systems performed and negative except otherwise noted in HPI.    Hospital Medications:  atorvastatin 80 milliGRAM(s) Oral at bedtime  dextrose 5%. 1000 milliLiter(s) IV Continuous <Continuous>  dextrose 5%. 1000 milliLiter(s) IV Continuous <Continuous>  dextrose 50% Injectable 25 Gram(s) IV Push once  dextrose 50% Injectable 12.5 Gram(s) IV Push once  dextrose 50% Injectable 25 Gram(s) IV Push once  dextrose Oral Gel 15 Gram(s) Oral once PRN  everolimus (ZORTRESS) 4 milliGRAM(s) Oral <User Schedule>  folic acid 1 milliGRAM(s) Oral daily  glucagon  Injectable 1 milliGRAM(s) IntraMuscular once  heparin   Injectable 5000 Unit(s) SubCutaneous every 12 hours  insulin glargine Injectable (LANTUS) 8 Unit(s) SubCutaneous at bedtime  insulin lispro (ADMELOG) corrective regimen sliding scale   SubCutaneous three times a day before meals  insulin lispro (ADMELOG) corrective regimen sliding scale   SubCutaneous at bedtime  insulin lispro Injectable (ADMELOG) 6 Unit(s) SubCutaneous three times a day before meals  lidocaine   4% Patch 1 Patch Transdermal daily  lidocaine   4% Patch 1 Patch Transdermal daily  mycophenolate mofetil 250 milliGRAM(s) Oral two times a day  omega-3-Acid Ethyl Esters 1 Gram(s) Oral two times a day  pantoprazole    Tablet 40 milliGRAM(s) Oral before breakfast  predniSONE   Tablet 5 milliGRAM(s) Oral daily  sertraline 100 milliGRAM(s) Oral daily  sodium chloride 0.9%. 1000 milliLiter(s) IV Continuous <Continuous>  tamsulosin 0.4 milliGRAM(s) Oral at bedtime  traMADol 25 milliGRAM(s) Oral every 12 hours PRN      PHYSICAL EXAM:   Vital Signs Last 24 Hrs  T(C): 37 (21 Jun 2022 05:22), Max: 37 (21 Jun 2022 05:22)  T(F): 98.6 (21 Jun 2022 05:22), Max: 98.6 (21 Jun 2022 05:22)  HR: 71 (21 Jun 2022 05:22) (71 - 74)  BP: 131/66 (21 Jun 2022 05:22) (131/66 - 168/80)  BP(mean): --  RR: 18 (21 Jun 2022 05:22) (18 - 18)  SpO2: 95% (21 Jun 2022 05:22) (94% - 96%)    GENERAL: no acute distress  NEURO: alert  HEENT: NCAT, no conjunctival pallor appreciated  CHEST: no respiratory distress, no accessory muscle use  CARDIAC: regular rate, +S1/S2  ABDOMEN: soft, nontender, no rebound or guarding  EXTREMITIES: warm, well perfused  SKIN: no lesions noted      LABS:                        8.9    3.73  )-----------( 145      ( 21 Jun 2022 06:21 )             28.1     06-21    139  |  107  |  39<H>  ----------------------------<  200<H>  4.6   |  22  |  1.86<H>    Ca    8.9      21 Jun 2022 06:21  Phos  3.4     06-21  Mg     1.8     06-21    TPro  6.5  /  Alb  3.4  /  TBili  0.2  /  DBili  x   /  AST  84<H>  /  ALT  152<H>  /  AlkPhos  120  06-21    LIVER FUNCTIONS - ( 21 Jun 2022 06:21 )  Alb: 3.4 g/dL / Pro: 6.5 g/dL / ALK PHOS: 120 U/L / ALT: 152 U/L / AST: 84 U/L / GGT: x                                   Interval Diagnostic Studies: see sunrise for full report

## 2022-06-21 NOTE — PROGRESS NOTE ADULT - ASSESSMENT
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    s/p OLT 7/2/20 (course c/b VRE bacteremia, L Foot OM, CNI toxicity to both Tacrolimus and Cyclosporine; switched to Everolimus 7/27/20).  Recent syncope and fall, COVID all in 12/2022.  Transferred s/p fall from [place]    s/p fall   - CT neck with mod-severe spinal stenosis with associated pain. Neuro/NeuroSx teams consulted, appreciate input  - MRI neck non-con with mod canal stenosis  - MRI lumbar completed.    - Neurosurgery: Can pursue rest of workup in outpatient setting. Can be referred to ortho spine and neurology (Dr Viera: [id number] [phone]) for EMG/NCS within 1-2 weeks after discharge. Given likely chronicity of pathology, low current clinical utility for steroids  - Continue PT/OT, pain management, fall precautions   - no cardiac concerns at this time  - Dental consult appreciated Rec; No signs of acute odontogenic infection. FU outpt for comprehensive dental care    L ureteral stone with hydronephrosis  -Appreciate  input  -continue PO/IV hydration and Flomax in hopes stone will pass  -Renal U/S with no acute stone, mild hydro   -mild MISA on arrival, Cr trending down    s/p OLT  - LFTs slightly up.    - DC Atorvastatin  -SQH/SCDs    Immunosuppression  -continue Everolimus per level, MMF  250BID, Pred 5  -PPx: Valcyte (recent low level viremia, Transplant ID to review), PPI    DM:  - Elevated glucose  - Lantus 10Units and premeal to 8untis  - Continue ISS  - Diabetic diet    Dispo:  - PT recs for SANTANA when medically cleared (pt refused SANTANA for now wants to be DC home with home PT)

## 2022-06-21 NOTE — PROGRESS NOTE ADULT - SUBJECTIVE AND OBJECTIVE BOX
[facility]  Ramón Falk PA-C  [facility] [place] [address]  [phone]      INTERVAL HPI/OVERNIGHT EVENTS:  pt seen and examined, no new events  tolerating diet, +BM    MEDICATIONS  (STANDING):  atorvastatin 80 milliGRAM(s) Oral at bedtime  dextrose 5%. 1000 milliLiter(s) (100 mL/Hr) IV Continuous <Continuous>  dextrose 5%. 1000 milliLiter(s) (50 mL/Hr) IV Continuous <Continuous>  dextrose 50% Injectable 25 Gram(s) IV Push once  dextrose 50% Injectable 12.5 Gram(s) IV Push once  dextrose 50% Injectable 25 Gram(s) IV Push once  everolimus (ZORTRESS) 4 milliGRAM(s) Oral <User Schedule>  folic acid 1 milliGRAM(s) Oral daily  glucagon  Injectable 1 milliGRAM(s) IntraMuscular once  heparin   Injectable 5000 Unit(s) SubCutaneous every 12 hours  insulin glargine Injectable (LANTUS) 8 Unit(s) SubCutaneous at bedtime  insulin lispro (ADMELOG) corrective regimen sliding scale   SubCutaneous three times a day before meals  insulin lispro (ADMELOG) corrective regimen sliding scale   SubCutaneous at bedtime  insulin lispro Injectable (ADMELOG) 6 Unit(s) SubCutaneous three times a day before meals  lidocaine   4% Patch 1 Patch Transdermal daily  lidocaine   4% Patch 1 Patch Transdermal daily  mycophenolate mofetil 250 milliGRAM(s) Oral two times a day  omega-3-Acid Ethyl Esters 1 Gram(s) Oral two times a day  pantoprazole    Tablet 40 milliGRAM(s) Oral before breakfast  polyethylene glycol 3350 17 Gram(s) Oral once  predniSONE   Tablet 5 milliGRAM(s) Oral daily  sertraline 100 milliGRAM(s) Oral daily  sodium chloride 0.9%. 1000 milliLiter(s) (50 mL/Hr) IV Continuous <Continuous>  tamsulosin 0.4 milliGRAM(s) Oral at bedtime    MEDICATIONS  (PRN):  dextrose Oral Gel 15 Gram(s) Oral once PRN Blood Glucose LESS THAN 70 milliGRAM(s)/deciliter  traMADol 25 milliGRAM(s) Oral every 12 hours PRN Severe Pain (7 - 10)      Allergies    codeine (Anaphylaxis)    Intolerances        ROS:   General:  No wt loss, fevers, chills, night sweats, fatigue,   Eyes:  Good vision, no reported pain  ENT:  No sore throat, pain, runny nose, dysphagia  CV:  No pain, palpitations, hypo/hypertension  Resp:  No dyspnea, cough, tachypnea, wheezing  GI:  No pain, No nausea, No vomiting, No diarrhea, No constipation, No weight loss, No fever, No pruritis, No rectal bleeding, No tarry stools, No dysphagia,  :  No pain, bleeding, incontinence, nocturia  Muscle:  No pain, weakness  Neuro:  No weakness, tingling, memory problems  Psych:  No fatigue, insomnia, mood problems, depression  Endocrine:  No polyuria, polydipsia, cold/heat intolerance  Heme:  No petechiae, ecchymosis, easy bruisability  Skin:  No rash, tattoos, scars, edema      PHYSICAL EXAM:   Vital Signs Last 24 Hrs  T(C): 37 (22 @ 05:22), Max: 37 (22 @ 05:22)  T(F): 98.6 (22 @ 05:22), Max: 98.6 (22 @ 05:22)  HR: 71 (22 @ 05:22) (71 - 74)  BP: 131/66 (22 @ 05:22) (131/66 - 168/80)  BP(mean): --  RR: 18 (22 @ 05:22) (18 - 18)  SpO2: 95% (22 @ 05:22) (94% - 96%)  Daily     Daily Weight in k.5 (2022 21:06)    GENERAL:  Appears stated age,   HEENT:  NC/AT,    CHEST:  Full & symmetric excursion,   HEART:  Regular rhythm,  ABDOMEN:  Soft, non-tender, non-distended,  EXTEREMITIES:  no cyanosis  SKIN:  No rash  NEURO:  Alert,       LABS:                        8.9    3.73  )-----------( 145      ( 2022 06:21 )             28.1       139  |  107  |  39<H>  ----------------------------<  200<H>  4.6   |  22  |  1.86<H>    Ca    8.9      2022 06:21  Phos  3.4       Mg     1.8         TPro  6.5  /  Alb  3.4  /  TBili  0.2  /  DBili  x   /  AST  84<H>  /  ALT  152<H>  /  AlkPhos  120          RADIOLOGY & ADDITIONAL TESTS:

## 2022-06-22 LAB
ALBUMIN SERPL ELPH-MCNC: 3.2 G/DL — LOW (ref 3.3–5)
ALP SERPL-CCNC: 122 U/L — HIGH (ref 40–120)
ALT FLD-CCNC: 146 U/L — HIGH (ref 10–45)
ANION GAP SERPL CALC-SCNC: 10 MMOL/L — SIGNIFICANT CHANGE UP (ref 5–17)
AST SERPL-CCNC: 70 U/L — HIGH (ref 10–40)
BILIRUB SERPL-MCNC: 0.2 MG/DL — SIGNIFICANT CHANGE UP (ref 0.2–1.2)
BUN SERPL-MCNC: 41 MG/DL — HIGH (ref 7–23)
CALCIUM SERPL-MCNC: 8.9 MG/DL — SIGNIFICANT CHANGE UP (ref 8.4–10.5)
CHLORIDE SERPL-SCNC: 108 MMOL/L — SIGNIFICANT CHANGE UP (ref 96–108)
CO2 SERPL-SCNC: 20 MMOL/L — LOW (ref 22–31)
CREAT SERPL-MCNC: 1.96 MG/DL — HIGH (ref 0.5–1.3)
EGFR: 37 ML/MIN/1.73M2 — LOW
EVEROLIMUS, WHOLE BLOOD RESULT: 6.9 NG/ML — SIGNIFICANT CHANGE UP (ref 3–8)
EVEROLIMUS, WHOLE BLOOD RESULT: 8 NG/ML — SIGNIFICANT CHANGE UP (ref 3–8)
EVEROLIMUS, WHOLE BLOOD RESULT: 9.1 NG/ML — HIGH (ref 3–8)
GLUCOSE BLDC GLUCOMTR-MCNC: 117 MG/DL — HIGH (ref 70–99)
GLUCOSE BLDC GLUCOMTR-MCNC: 165 MG/DL — HIGH (ref 70–99)
GLUCOSE BLDC GLUCOMTR-MCNC: 181 MG/DL — HIGH (ref 70–99)
GLUCOSE BLDC GLUCOMTR-MCNC: 253 MG/DL — HIGH (ref 70–99)
GLUCOSE SERPL-MCNC: 244 MG/DL — HIGH (ref 70–99)
HCT VFR BLD CALC: 29.3 % — LOW (ref 39–50)
HGB BLD-MCNC: 9.4 G/DL — LOW (ref 13–17)
MAGNESIUM SERPL-MCNC: 1.8 MG/DL — SIGNIFICANT CHANGE UP (ref 1.6–2.6)
MCHC RBC-ENTMCNC: 29.8 PG — SIGNIFICANT CHANGE UP (ref 27–34)
MCHC RBC-ENTMCNC: 32.1 GM/DL — SIGNIFICANT CHANGE UP (ref 32–36)
MCV RBC AUTO: 93 FL — SIGNIFICANT CHANGE UP (ref 80–100)
NRBC # BLD: 0 /100 WBCS — SIGNIFICANT CHANGE UP (ref 0–0)
PHOSPHATE SERPL-MCNC: 3.3 MG/DL — SIGNIFICANT CHANGE UP (ref 2.5–4.5)
PLATELET # BLD AUTO: 159 K/UL — SIGNIFICANT CHANGE UP (ref 150–400)
POTASSIUM SERPL-MCNC: 4.6 MMOL/L — SIGNIFICANT CHANGE UP (ref 3.5–5.3)
POTASSIUM SERPL-SCNC: 4.6 MMOL/L — SIGNIFICANT CHANGE UP (ref 3.5–5.3)
PROT SERPL-MCNC: 6.4 G/DL — SIGNIFICANT CHANGE UP (ref 6–8.3)
RBC # BLD: 3.15 M/UL — LOW (ref 4.2–5.8)
RBC # FLD: 13.3 % — SIGNIFICANT CHANGE UP (ref 10.3–14.5)
SODIUM SERPL-SCNC: 138 MMOL/L — SIGNIFICANT CHANGE UP (ref 135–145)
WBC # BLD: 3.21 K/UL — LOW (ref 3.8–10.5)
WBC # FLD AUTO: 3.21 K/UL — LOW (ref 3.8–10.5)

## 2022-06-22 PROCEDURE — 99222 1ST HOSP IP/OBS MODERATE 55: CPT

## 2022-06-22 PROCEDURE — 99232 SBSQ HOSP IP/OBS MODERATE 35: CPT | Mod: GC

## 2022-06-22 RX ORDER — MAGNESIUM SULFATE 500 MG/ML
2 VIAL (ML) INJECTION ONCE
Refills: 0 | Status: COMPLETED | OUTPATIENT
Start: 2022-06-22 | End: 2022-06-22

## 2022-06-22 RX ADMIN — TRAMADOL HYDROCHLORIDE 25 MILLIGRAM(S): 50 TABLET ORAL at 06:00

## 2022-06-22 RX ADMIN — PANTOPRAZOLE SODIUM 40 MILLIGRAM(S): 20 TABLET, DELAYED RELEASE ORAL at 06:01

## 2022-06-22 RX ADMIN — TAMSULOSIN HYDROCHLORIDE 0.4 MILLIGRAM(S): 0.4 CAPSULE ORAL at 20:21

## 2022-06-22 RX ADMIN — EVEROLIMUS 4 MILLIGRAM(S): 10 TABLET ORAL at 08:36

## 2022-06-22 RX ADMIN — HEPARIN SODIUM 5000 UNIT(S): 5000 INJECTION INTRAVENOUS; SUBCUTANEOUS at 06:00

## 2022-06-22 RX ADMIN — MYCOPHENOLATE MOFETIL 250 MILLIGRAM(S): 250 CAPSULE ORAL at 06:00

## 2022-06-22 RX ADMIN — Medication 10 UNIT(S): at 08:37

## 2022-06-22 RX ADMIN — LIDOCAINE 1 PATCH: 4 CREAM TOPICAL at 00:00

## 2022-06-22 RX ADMIN — Medication 1 MILLIGRAM(S): at 11:55

## 2022-06-22 RX ADMIN — MYCOPHENOLATE MOFETIL 250 MILLIGRAM(S): 250 CAPSULE ORAL at 17:25

## 2022-06-22 RX ADMIN — SERTRALINE 100 MILLIGRAM(S): 25 TABLET, FILM COATED ORAL at 11:55

## 2022-06-22 RX ADMIN — Medication 1 GRAM(S): at 06:00

## 2022-06-22 RX ADMIN — Medication 6: at 08:37

## 2022-06-22 RX ADMIN — TRAMADOL HYDROCHLORIDE 25 MILLIGRAM(S): 50 TABLET ORAL at 06:40

## 2022-06-22 RX ADMIN — Medication 2: at 13:14

## 2022-06-22 RX ADMIN — LIDOCAINE 1 PATCH: 4 CREAM TOPICAL at 11:54

## 2022-06-22 RX ADMIN — Medication 1 GRAM(S): at 17:25

## 2022-06-22 RX ADMIN — Medication 25 GRAM(S): at 13:13

## 2022-06-22 RX ADMIN — Medication 10 UNIT(S): at 13:13

## 2022-06-22 RX ADMIN — EVEROLIMUS 4 MILLIGRAM(S): 10 TABLET ORAL at 20:21

## 2022-06-22 RX ADMIN — Medication 5 MILLIGRAM(S): at 06:00

## 2022-06-22 RX ADMIN — INSULIN GLARGINE 12 UNIT(S): 100 INJECTION, SOLUTION SUBCUTANEOUS at 21:44

## 2022-06-22 RX ADMIN — Medication 10 UNIT(S): at 17:24

## 2022-06-22 RX ADMIN — HEPARIN SODIUM 5000 UNIT(S): 5000 INJECTION INTRAVENOUS; SUBCUTANEOUS at 17:25

## 2022-06-22 NOTE — PROGRESS NOTE ADULT - SUBJECTIVE AND OBJECTIVE BOX
[facility]  Ramón Falk PA-C  [facility] [place] [address]  [phone]      INTERVAL HPI/OVERNIGHT EVENTS:  pt seen and examined, no new events  tolerating diet, +BM    MEDICATIONS  (STANDING):  atorvastatin 80 milliGRAM(s) Oral at bedtime  dextrose 5%. 1000 milliLiter(s) (100 mL/Hr) IV Continuous <Continuous>  dextrose 5%. 1000 milliLiter(s) (50 mL/Hr) IV Continuous <Continuous>  dextrose 50% Injectable 25 Gram(s) IV Push once  dextrose 50% Injectable 12.5 Gram(s) IV Push once  dextrose 50% Injectable 25 Gram(s) IV Push once  everolimus (ZORTRESS) 4 milliGRAM(s) Oral <User Schedule>  folic acid 1 milliGRAM(s) Oral daily  glucagon  Injectable 1 milliGRAM(s) IntraMuscular once  heparin   Injectable 5000 Unit(s) SubCutaneous every 12 hours  insulin glargine Injectable (LANTUS) 8 Unit(s) SubCutaneous at bedtime  insulin lispro (ADMELOG) corrective regimen sliding scale   SubCutaneous three times a day before meals  insulin lispro (ADMELOG) corrective regimen sliding scale   SubCutaneous at bedtime  insulin lispro Injectable (ADMELOG) 6 Unit(s) SubCutaneous three times a day before meals  lidocaine   4% Patch 1 Patch Transdermal daily  lidocaine   4% Patch 1 Patch Transdermal daily  mycophenolate mofetil 250 milliGRAM(s) Oral two times a day  omega-3-Acid Ethyl Esters 1 Gram(s) Oral two times a day  pantoprazole    Tablet 40 milliGRAM(s) Oral before breakfast  polyethylene glycol 3350 17 Gram(s) Oral once  predniSONE   Tablet 5 milliGRAM(s) Oral daily  sertraline 100 milliGRAM(s) Oral daily  sodium chloride 0.9%. 1000 milliLiter(s) (50 mL/Hr) IV Continuous <Continuous>  tamsulosin 0.4 milliGRAM(s) Oral at bedtime    MEDICATIONS  (PRN):  dextrose Oral Gel 15 Gram(s) Oral once PRN Blood Glucose LESS THAN 70 milliGRAM(s)/deciliter  traMADol 25 milliGRAM(s) Oral every 12 hours PRN Severe Pain (7 - 10)      Allergies    codeine (Anaphylaxis)    Intolerances        ROS:   General:  No wt loss, fevers, chills, night sweats, fatigue,   Eyes:  Good vision, no reported pain  ENT:  No sore throat, pain, runny nose, dysphagia  CV:  No pain, palpitations, hypo/hypertension  Resp:  No dyspnea, cough, tachypnea, wheezing  GI:  No pain, No nausea, No vomiting, No diarrhea, No constipation, No weight loss, No fever, No pruritis, No rectal bleeding, No tarry stools, No dysphagia,  :  No pain, bleeding, incontinence, nocturia  Muscle:  No pain, weakness  Neuro:  No weakness, tingling, memory problems  Psych:  No fatigue, insomnia, mood problems, depression  Endocrine:  No polyuria, polydipsia, cold/heat intolerance  Heme:  No petechiae, ecchymosis, easy bruisability  Skin:  No rash, tattoos, scars, edema      PHYSICAL EXAM:   Vital Signs Last 24 Hrs  T(C): 36.7 (2022 09:00), Max: 36.9 (2022 01:00)  T(F): 98.1 (2022 09:00), Max: 98.5 (2022 01:00)  HR: 78 (2022 09:00) (73 - 80)  BP: 137/73 (2022 09:00) (137/73 - 156/74)  BP(mean): --  RR: 18 (2022 09:00) (18 - 18)  SpO2: 96% (2022 09:00) (95% - 98%)  Daily     Daily Weight in k.5 (2022 21:06)    GENERAL:  Appears stated age,   HEENT:  NC/AT,    CHEST:  Full & symmetric excursion,   HEART:  Regular rhythm,  ABDOMEN:  Soft, non-tender, non-distended,  EXTEREMITIES:  no cyanosis  SKIN:  No rash  NEURO:  Alert,       LABS:                        9.4    3.21  )-----------( 159      ( 2022 06:19 )             29.3   06-22    138  |  108  |  41<H>  ----------------------------<  244<H>  4.6   |  20<L>  |  1.96<H>    Ca    8.9      2022 06:19  Phos  3.3       Mg     1.8         TPro  6.4  /  Alb  3.2<L>  /  TBili  0.2  /  DBili  x   /  AST  70<H>  /  ALT  146<H>  /  AlkPhos  122<H>          RADIOLOGY & ADDITIONAL TESTS:

## 2022-06-22 NOTE — CONSULT NOTE ADULT - PROVIDER SPECIALTY LIST ADULT
Neurosurgery
Dental
Gastroenterology
Neurology
Rehab Medicine
Transplant ID
Transplant Hepatology
Urology

## 2022-06-22 NOTE — CONSULT NOTE ADULT - CONSULT REASON
MR findings
kidney stone
Abnormal Urinalysis, CMV Viremia
Hx GIB, office pt
falls
Rehabilitation consult
post-LT
rule out odontogenic source of infection

## 2022-06-22 NOTE — PROGRESS NOTE ADULT - ASSESSMENT
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    s/p OLT 7/2/20 (course c/b VRE bacteremia, L Foot OM, CNI toxicity to both Tacrolimus and Cyclosporine; switched to Everolimus 7/27/20).  Recent syncope and fall, COVID all in 12/2022.  Transferred s/p fall from [facility]    s/p fall   - CT neck with mod-severe spinal stenosis with associated pain. Neuro/NeuroSx teams consulted, appreciate input  - MRI neck non-con with mod canal stenosis  - MRI lumbar completed.    - Neurosurgery: Can pursue rest of workup in outpatient setting. Can be referred to ortho spine and neurology (Dr Viera: [phone] [phone]) for EMG/NCS within 1-2 weeks after discharge. Given likely chronicity of pathology, low current clinical utility for steroids  - Continue PT/OT, pain management, fall precautions . Physiatry consult to asses potential for acute rehab   - no cardiac concerns at this time  - Dental consult appreciated Rec; No signs of acute odontogenic infection. FU outpt for comprehensive dental care    L ureteral stone with hydronephrosis  -Appreciate  input  -continue PO/IV hydration and Flomax in hopes stone will pass  -Renal U/S with no acute stone, mild hydro   -mild MISA on arrival, Cr trending down    s/p OLT  - LFTs persist slightly elevated  - Atorvastatin DCd 6/21  -SQH/SCDs    Immunosuppression  -continue Everolimus per level, MMF  250BID, Pred 5  -PPx: Valcyte (recent low level viremia, Transplant ID to review), PPI    DM:  - Elevated glucose  - Lantus 10Units and premeal to 8untis  - Continue ISS  - Diabetic diet    Dispo:  - pending Physiatry eval for acute rehab

## 2022-06-22 NOTE — CONSULT NOTE ADULT - REASON FOR ADMISSION
ureteral calculus, hydroureteronephrosis

## 2022-06-22 NOTE — PROGRESS NOTE ADULT - ASSESSMENT
66M w/ PMHx JEAN cirrhosis s/p OLT 7/2/20 (course c/b VRE bacteremia, CNI toxicity to both tacro and cyclosporine; switched to everolimus 7/27/20), HLD, IDDM, obesity, HFpEF presenting w/ fall and ureteral stone to [facility] on 6/15/22, now transferred to [facility] for further management.     Impression:   #Fall, rib fracture: now second admission for falls c/b rib fracture. Per neurologic evaluation possibly 2/2 spinal canal stenosis     #Ureteral stone c/b hydronephrosis, stable/improved  #Elevated liver enzymes: liver biopsy 12/30/21 without e/o rejection, found to have NRH  #JEAN cirrhosis s/p LT  #CMV viremia   #DM  #Anemia    Recommendations:  - appreciate neuro evaluation re: recurrent falls, stating likely 2/2 spinal canal stenosis  (NSGY with no plans for surgical intervention) vs may need EMG evaluation as an outpatient  - immunosuppression: everolimus 4 mg BID (dose by level), and CellCept 250 mg BID, and prednisone 5mg daily  - d/c valcyte per ID   - will need outpatient neurology follow up   - appreciate dispo planning (re: rehab eval)      Recommendations incomplete until finalized by attending signature/attestation to note.    Thank you for involving us in the care of this patient, please reach out if any further questions.     Ky Ram MD  Gastroenterology/Hepatology Fellow, PGY5    Available on [employer] Teams  [phone] ([facility])  [id number] ([facility])  Please contact on call fellow weekdays after 5pm-7am and weekends: [phone]

## 2022-06-22 NOTE — PROGRESS NOTE ADULT - SUBJECTIVE AND OBJECTIVE BOX
Transplant Surgery - Multidisciplinary Rounds  --------------------------------------------------------------  OLT 7/2/2020    Present: Patient seen and examined with multidisciplinary Transplant team including  Surgeon: Dr. Gray. Hepatologist: Dr. Juarez, transplant pharmacist ANDRZEJ Blanca, ZBIGNIEW Hurley and unit RNs during AM rounds.   Disciplines not in attendance will be notified of the plan.     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    s/p OLT 7/2/20 (course c/b VRE bacteremia, L foot OM, CNI toxicity to both Tacrolimus and Cyclosporine; switched to Everolimus 7/27/20    Admitted from 12/20/21 to 1/6/22 s/p fall at home, no LOC or injuries.  - Found to be +Covid w/ CT chest findings concerning for beginnings of multifocal PNA. Received remdesivir and mononclonal antibodies and on dexamethasone (12/29-1/7).   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 PCP prophylaxis changed from bactrim to atovaquone for LFTs.   -Patient was found to have DVT on L femoral vein and started on full treatment AC, now on Eliquis 5mg BID.     ------------  Transferred to [facility] from [facility] on 6/16/2022 where he was admitted for multiple falls over 24hr period.  +HA, +neck pain, +L rib pain.  Imaging revealed:  -C5-C7 mod-severe spinal stenosis  -L rib fx  -5mm distal L ureteral stone  -diarrhea    Interval Events:  - Afebrile, VSS  - Continues to be weak. Ambulating in room with walker due to b/l LE weakness limiting mobility. Wants to go home as opposed to rehab but willing to be evaluated for acute rehab  - Neuro recommending outpatient follow-up  Will need EMG/NCS  - Hyperglycemia: Insulin regimen adjusted  -LFTs stable    Immunosuppression:  Everolimus 4mg BID, MMF 250mg bid (due to GI symptoms), pred 5QD  Potential Discharge date: pending clinical stability  Education:  Medications  Plan of care:  See Below       MEDICATIONS  (STANDING):  dextrose 5%. 1000 milliLiter(s) (100 mL/Hr) IV Continuous <Continuous>  dextrose 5%. 1000 milliLiter(s) (50 mL/Hr) IV Continuous <Continuous>  dextrose 50% Injectable 25 Gram(s) IV Push once  dextrose 50% Injectable 12.5 Gram(s) IV Push once  dextrose 50% Injectable 25 Gram(s) IV Push once  everolimus (ZORTRESS) 4 milliGRAM(s) Oral <User Schedule>  folic acid 1 milliGRAM(s) Oral daily  glucagon  Injectable 1 milliGRAM(s) IntraMuscular once  heparin   Injectable 5000 Unit(s) SubCutaneous every 12 hours  insulin glargine Injectable (LANTUS) 12 Unit(s) SubCutaneous at bedtime  insulin lispro (ADMELOG) corrective regimen sliding scale   SubCutaneous three times a day before meals  insulin lispro (ADMELOG) corrective regimen sliding scale   SubCutaneous at bedtime  insulin lispro Injectable (ADMELOG) 10 Unit(s) SubCutaneous three times a day before meals  lidocaine   4% Patch 1 Patch Transdermal daily  lidocaine   4% Patch 1 Patch Transdermal daily  mycophenolate mofetil 250 milliGRAM(s) Oral two times a day  omega-3-Acid Ethyl Esters 1 Gram(s) Oral two times a day  pantoprazole    Tablet 40 milliGRAM(s) Oral before breakfast  polyethylene glycol 3350 17 Gram(s) Oral daily  predniSONE   Tablet 5 milliGRAM(s) Oral daily  sertraline 100 milliGRAM(s) Oral daily  tamsulosin 0.4 milliGRAM(s) Oral at bedtime    MEDICATIONS  (PRN):  artificial  tears Solution 1 Drop(s) Both EYES four times a day PRN Dry Eyes  dextrose Oral Gel 15 Gram(s) Oral once PRN Blood Glucose LESS THAN 70 milliGRAM(s)/deciliter  traMADol 25 milliGRAM(s) Oral every 12 hours PRN Severe Pain (7 - 10)      PAST MEDICAL & SURGICAL HISTORY:  Diabetes      Hypercholesteremia      Neuropathy      Hypertension      Renal stones  25 years ago      Fatty liver disease, nonalcoholic      Obesity      Hepatic encephalopathy      GERD with esophagitis  Gastritis &amp; Non Bleeding Ulcers      GIB (gastrointestinal bleeding)      S/P cholecystectomy          Vital Signs Last 24 Hrs  T(C): 36.7 (22 Jun 2022 09:00), Max: 36.9 (22 Jun 2022 01:00)  T(F): 98.1 (22 Jun 2022 09:00), Max: 98.5 (22 Jun 2022 01:00)  HR: 78 (22 Jun 2022 09:00) (73 - 80)  BP: 137/73 (22 Jun 2022 09:00) (137/73 - 156/74)  BP(mean): --  RR: 18 (22 Jun 2022 09:00) (18 - 18)  SpO2: 96% (22 Jun 2022 09:00) (95% - 98%)    I&O's Summary    21 Jun 2022 07:01  -  22 Jun 2022 07:00  --------------------------------------------------------  IN: 1320 mL / OUT: 2050 mL / NET: -730 mL    22 Jun 2022 07:01  -  22 Jun 2022 12:30  --------------------------------------------------------  IN: 340 mL / OUT: 400 mL / NET: -60 mL                              9.4    3.21  )-----------( 159      ( 22 Jun 2022 06:19 )             29.3     06-22    138  |  108  |  41<H>  ----------------------------<  244<H>  4.6   |  20<L>  |  1.96<H>    Ca    8.9      22 Jun 2022 06:19  Phos  3.3     06-22  Mg     1.8     06-22    TPro  6.4  /  Alb  3.2<L>  /  TBili  0.2  /  DBili  x   /  AST  70<H>  /  ALT  146<H>  /  AlkPhos  122<H>  06-22          Culture - Blood (collected 06-16-22 @ 17:21)  Source: .Blood Blood  Final Report (06-21-22 @ 23:00):    No Growth Final    Culture - Blood (collected 06-16-22 @ 17:21)  Source: .Blood Blood  Final Report (06-21-22 @ 23:00):    No Growth Final    Culture - Urine (collected 06-16-22 @ 10:07)  Source: Clean Catch Clean Catch (Midstream)  Final Report (06-17-22 @ 14:27):    <10,000 CFU/mL Normal Urogenital Shantel    Culture - Urine (collected 06-16-22 @ 00:52)  Source: Clean Catch Clean Catch (Midstream)  Final Report (06-16-22 @ 23:14):    <10,000 CFU/mL Normal Urogenital Shantel         Review of systems  Gen: No weight changes, fatigue, fevers/chills, see hpi  Skin: No rashes  Head/Eyes/Ears/Mouth: No headache; Normal hearing; Normal vision w/o blurriness; No sinus pain/discomfort, sore throat  Respiratory: No dyspnea, cough, wheezing, hemoptysis  CV: No chest pain, PND, orthopnea  GI:  no diarrhea, constipation, nausea, vomiting, melena, hematochezia  : No increased frequency, dysuria, hematuria, nocturia  MSK: see hpi, no edema  Neuro: No dizziness/lightheadedness, weakness, seizures, numbness, tingling  Heme: No easy bruising or bleeding  Endo: No heat/cold intolerance  Psych: No significant nervousness, anxiety, stress, depression  All other systems were reviewed and are negative, except as noted.      PHYSICAL EXAM:  Constitutional: Well developed / well nourished  Eyes: anicteric, PERRLA  ENMT: nc/at, no thrush, loose front tooth  Neck: supple  Respiratory: CTA B/L  Cardiovascular: RRR.  Gastrointestinal: Soft abdomen, ND/NT. well healed incisional scar  Genitourinary: Voiding spontaneously  Extremities: SCD's in place and working bilaterally, no calf TTP. no edema. L rib pain to palpation  Vascular: Palpable dp pulses bilaterally.   Neurological: A&O x3  Skin: no rashes, ulcerations, lesions  Musculoskeletal: Moving all extremities, decreased strength throughout  Psychiatric: Responsive

## 2022-06-22 NOTE — CONSULT NOTE ADULT - CONSULT REQUESTED DATE/TIME
18-Jun-2022 13:01
22-Jun-2022 14:34
17-Jun-2022 14:43
17-Jun-2022 17:34
17-Jun-2022 22:19
18-Jun-2022 13:48
16-Jun-2022
16-Jun-2022 13:20

## 2022-06-22 NOTE — PROGRESS NOTE ADULT - SUBJECTIVE AND OBJECTIVE BOX
3-15-17   Home visit/[place] Risk Assessment/EW screening completed today at the TCU in anticipation that client may be DC back to the TCU next week.  CC had talked with TAYLOR Boo at the [facility] today and she stated that client, daughter and staff are going over to the [facility] tomorrow to see how client does.  SW stated that she is doing better but still there is some concern about her ability to go back to the AL.    CC then visited with PT at the TCU Shira. She stated as well some apprehension about client going back but stated that if she is able to be DC then she would recommend a self locking wheelchair. CC asked if client is going to be DC next week then who is going to wok on getting this order started.  Shira stated that client could borrow one from the TCU until this was ordered.  CC stated this was fine, but she needed help from the therapy department to initiate the process of getting a new one since she had been in the facility for greater then 30 days.  Shira stated she would take with OT and get back to CC if client does DC next week.    Member resides: Currently client has been in the TCU for greater then 30 days and there is conversation about potentially being DC next week depending on the results of the CT scan and also on how client does going over there with family.    Member currently receiving the following services:  Client has 24 hr customized living at [facility].  CC closed EW due to greater then 30 days in the TCU.  CC will open this up again if client is DC back to the [facility].    See EMR for a list of client's diagnoses and medications.   Medication management: Medications reviewed:N/A. Medication management: RN med set up. Medication understanding:Caregiver has understanding of regimen and is able to complete med set up/administration Yes. Client does not manage her own medication at this time and will not if she goes back to the AL. RN will set up and administer   Member Mood/behavior-PHQ9  Interval Events:   NAEON, afebrile HD stable  Doing well, denies complaints  Will think about going to acute rehab      ROS:   12 point review of systems performed and negative except otherwise noted in HPI.    Hospital Medications:  atorvastatin 80 milliGRAM(s) Oral at bedtime  dextrose 5%. 1000 milliLiter(s) IV Continuous <Continuous>  dextrose 5%. 1000 milliLiter(s) IV Continuous <Continuous>  dextrose 50% Injectable 25 Gram(s) IV Push once  dextrose 50% Injectable 12.5 Gram(s) IV Push once  dextrose 50% Injectable 25 Gram(s) IV Push once  dextrose Oral Gel 15 Gram(s) Oral once PRN  everolimus (ZORTRESS) 4 milliGRAM(s) Oral <User Schedule>  folic acid 1 milliGRAM(s) Oral daily  glucagon  Injectable 1 milliGRAM(s) IntraMuscular once  heparin   Injectable 5000 Unit(s) SubCutaneous every 12 hours  insulin glargine Injectable (LANTUS) 8 Unit(s) SubCutaneous at bedtime  insulin lispro (ADMELOG) corrective regimen sliding scale   SubCutaneous three times a day before meals  insulin lispro (ADMELOG) corrective regimen sliding scale   SubCutaneous at bedtime  insulin lispro Injectable (ADMELOG) 6 Unit(s) SubCutaneous three times a day before meals  lidocaine   4% Patch 1 Patch Transdermal daily  lidocaine   4% Patch 1 Patch Transdermal daily  mycophenolate mofetil 250 milliGRAM(s) Oral two times a day  omega-3-Acid Ethyl Esters 1 Gram(s) Oral two times a day  pantoprazole    Tablet 40 milliGRAM(s) Oral before breakfast  predniSONE   Tablet 5 milliGRAM(s) Oral daily  sertraline 100 milliGRAM(s) Oral daily  sodium chloride 0.9%. 1000 milliLiter(s) IV Continuous <Continuous>  tamsulosin 0.4 milliGRAM(s) Oral at bedtime  traMADol 25 milliGRAM(s) Oral every 12 hours PRN      PHYSICAL EXAM:   Vital Signs Last 24 Hrs  T(C): 36.7 (22 Jun 2022 09:00), Max: 36.9 (22 Jun 2022 01:00)  T(F): 98.1 (22 Jun 2022 09:00), Max: 98.5 (22 Jun 2022 01:00)  HR: 78 (22 Jun 2022 09:00) (73 - 80)  BP: 137/73 (22 Jun 2022 09:00) (137/73 - 156/74)  BP(mean): --  RR: 18 (22 Jun 2022 09:00) (18 - 18)  SpO2: 96% (22 Jun 2022 09:00) (95% - 98%)    GENERAL: no acute distress  NEURO: alert  HEENT: NCAT, no conjunctival pallor  appreciated  CHEST: no respiratory distress, no accessory muscle use  CARDIAC: regular rate, +S1/S2  ABDOMEN: soft, nontender, no rebound or guarding  EXTREMITIES: warm, well perfused  SKIN: no lesions noted    LABS:                        9.4    3.21  )-----------( 159      ( 22 Jun 2022 06:19 )             29.3     06-22    138  |  108  |  41<H>  ----------------------------<  244<H>  4.6   |  20<L>  |  1.96<H>    Ca    8.9      22 Jun 2022 06:19  Phos  3.3     06-22  Mg     1.8     06-22    TPro  6.4  /  Alb  3.2<L>  /  TBili  0.2  /  DBili  x   /  AST  70<H>  /  ALT  146<H>  /  AlkPhos  122<H>  06-22    LIVER FUNCTIONS - ( 22 Jun 2022 06:19 )  Alb: 3.2 g/dL / Pro: 6.4 g/dL / ALK PHOS: 122 U/L / ALT: 146 U/L / AST: 70 U/L / GGT: x                             Interval Diagnostic Studies: see sunrise for full report   score:  No concerns, but client did state to CC manage times during the visit:  I don't like it hear, I want to go back to my home.  CC asked her more specifically where her home was and she was unable to identify where this was but she knows it is not were she is now.     Plan of Care: CC will wait for update to see how client did when she goes over to the AL and also to see what family decides after the results of the CAT scan.  CC will then move forward with either transferring her to LTC client or opening her back up to EW if she returns to AL.    CC will sign CP signature sheet today and then f/u with client and family for review and signature if client is DC back to the AL.    CC will wait to hear back from SW and RN at AL later in this week to see what next steps will be for client.    Follow-Up Plan: Member informed of future contact, plan to f/u with member with a 6 month telephone assessment.  Contact information shared with member and family, encouraged member to call with any questions or concerns prior to this.  See [facility] for further detailed information  Arely Duckworth MA [facility] Care Coordinator   [phone]

## 2022-06-23 ENCOUNTER — APPOINTMENT (OUTPATIENT)
Dept: HEPATOLOGY | Facility: CLINIC | Age: 67
End: 2022-06-23

## 2022-06-23 LAB
ALBUMIN SERPL ELPH-MCNC: 3.2 G/DL — LOW (ref 3.3–5)
ALP SERPL-CCNC: 125 U/L — HIGH (ref 40–120)
ALT FLD-CCNC: 126 U/L — HIGH (ref 10–45)
ANION GAP SERPL CALC-SCNC: 13 MMOL/L — SIGNIFICANT CHANGE UP (ref 5–17)
AST SERPL-CCNC: 49 U/L — HIGH (ref 10–40)
BASOPHILS # BLD AUTO: 0.04 K/UL — SIGNIFICANT CHANGE UP (ref 0–0.2)
BASOPHILS NFR BLD AUTO: 1.1 % — SIGNIFICANT CHANGE UP (ref 0–2)
BILIRUB SERPL-MCNC: 0.2 MG/DL — SIGNIFICANT CHANGE UP (ref 0.2–1.2)
BUN SERPL-MCNC: 42 MG/DL — HIGH (ref 7–23)
CALCIUM SERPL-MCNC: 9 MG/DL — SIGNIFICANT CHANGE UP (ref 8.4–10.5)
CHLORIDE SERPL-SCNC: 106 MMOL/L — SIGNIFICANT CHANGE UP (ref 96–108)
CO2 SERPL-SCNC: 21 MMOL/L — LOW (ref 22–31)
CREAT SERPL-MCNC: 2.01 MG/DL — HIGH (ref 0.5–1.3)
EGFR: 36 ML/MIN/1.73M2 — LOW
EOSINOPHIL # BLD AUTO: 0.06 K/UL — SIGNIFICANT CHANGE UP (ref 0–0.5)
EOSINOPHIL NFR BLD AUTO: 1.7 % — SIGNIFICANT CHANGE UP (ref 0–6)
GLUCOSE BLDC GLUCOMTR-MCNC: 131 MG/DL — HIGH (ref 70–99)
GLUCOSE BLDC GLUCOMTR-MCNC: 164 MG/DL — HIGH (ref 70–99)
GLUCOSE BLDC GLUCOMTR-MCNC: 198 MG/DL — HIGH (ref 70–99)
GLUCOSE BLDC GLUCOMTR-MCNC: 239 MG/DL — HIGH (ref 70–99)
GLUCOSE BLDC GLUCOMTR-MCNC: 250 MG/DL — HIGH (ref 70–99)
GLUCOSE SERPL-MCNC: 238 MG/DL — HIGH (ref 70–99)
HCT VFR BLD CALC: 29.3 % — LOW (ref 39–50)
HGB BLD-MCNC: 9.3 G/DL — LOW (ref 13–17)
IMM GRANULOCYTES NFR BLD AUTO: 1.1 % — SIGNIFICANT CHANGE UP (ref 0–1.5)
LYMPHOCYTES # BLD AUTO: 0.82 K/UL — LOW (ref 1–3.3)
LYMPHOCYTES # BLD AUTO: 22.6 % — SIGNIFICANT CHANGE UP (ref 13–44)
MAGNESIUM SERPL-MCNC: 2 MG/DL — SIGNIFICANT CHANGE UP (ref 1.6–2.6)
MCHC RBC-ENTMCNC: 29.2 PG — SIGNIFICANT CHANGE UP (ref 27–34)
MCHC RBC-ENTMCNC: 31.7 GM/DL — LOW (ref 32–36)
MCV RBC AUTO: 91.8 FL — SIGNIFICANT CHANGE UP (ref 80–100)
MONOCYTES # BLD AUTO: 0.39 K/UL — SIGNIFICANT CHANGE UP (ref 0–0.9)
MONOCYTES NFR BLD AUTO: 10.7 % — SIGNIFICANT CHANGE UP (ref 2–14)
NEUTROPHILS # BLD AUTO: 2.28 K/UL — SIGNIFICANT CHANGE UP (ref 1.8–7.4)
NEUTROPHILS NFR BLD AUTO: 62.8 % — SIGNIFICANT CHANGE UP (ref 43–77)
NRBC # BLD: 0 /100 WBCS — SIGNIFICANT CHANGE UP (ref 0–0)
PHOSPHATE SERPL-MCNC: 3.6 MG/DL — SIGNIFICANT CHANGE UP (ref 2.5–4.5)
PLATELET # BLD AUTO: 149 K/UL — LOW (ref 150–400)
POTASSIUM SERPL-MCNC: 4.6 MMOL/L — SIGNIFICANT CHANGE UP (ref 3.5–5.3)
POTASSIUM SERPL-SCNC: 4.6 MMOL/L — SIGNIFICANT CHANGE UP (ref 3.5–5.3)
PROT SERPL-MCNC: 6.4 G/DL — SIGNIFICANT CHANGE UP (ref 6–8.3)
RBC # BLD: 3.19 M/UL — LOW (ref 4.2–5.8)
RBC # FLD: 13.2 % — SIGNIFICANT CHANGE UP (ref 10.3–14.5)
SARS-COV-2 RNA SPEC QL NAA+PROBE: SIGNIFICANT CHANGE UP
SODIUM SERPL-SCNC: 140 MMOL/L — SIGNIFICANT CHANGE UP (ref 135–145)
WBC # BLD: 3.63 K/UL — LOW (ref 3.8–10.5)
WBC # FLD AUTO: 3.63 K/UL — LOW (ref 3.8–10.5)

## 2022-06-23 PROCEDURE — 99232 SBSQ HOSP IP/OBS MODERATE 35: CPT | Mod: GC

## 2022-06-23 RX ORDER — LIDOCAINE 4 G/100G
1 CREAM TOPICAL DAILY
Refills: 0 | Status: DISCONTINUED | OUTPATIENT
Start: 2022-06-23 | End: 2022-06-29

## 2022-06-23 RX ORDER — INSULIN GLARGINE 100 [IU]/ML
14 INJECTION, SOLUTION SUBCUTANEOUS AT BEDTIME
Refills: 0 | Status: DISCONTINUED | OUTPATIENT
Start: 2022-06-23 | End: 2022-06-29

## 2022-06-23 RX ADMIN — HEPARIN SODIUM 5000 UNIT(S): 5000 INJECTION INTRAVENOUS; SUBCUTANEOUS at 17:29

## 2022-06-23 RX ADMIN — LIDOCAINE 1 PATCH: 4 CREAM TOPICAL at 01:24

## 2022-06-23 RX ADMIN — TRAMADOL HYDROCHLORIDE 25 MILLIGRAM(S): 50 TABLET ORAL at 05:32

## 2022-06-23 RX ADMIN — PANTOPRAZOLE SODIUM 40 MILLIGRAM(S): 20 TABLET, DELAYED RELEASE ORAL at 05:26

## 2022-06-23 RX ADMIN — Medication 1 GRAM(S): at 17:28

## 2022-06-23 RX ADMIN — TRAMADOL HYDROCHLORIDE 25 MILLIGRAM(S): 50 TABLET ORAL at 06:14

## 2022-06-23 RX ADMIN — MYCOPHENOLATE MOFETIL 250 MILLIGRAM(S): 250 CAPSULE ORAL at 05:26

## 2022-06-23 RX ADMIN — MYCOPHENOLATE MOFETIL 250 MILLIGRAM(S): 250 CAPSULE ORAL at 17:29

## 2022-06-23 RX ADMIN — LIDOCAINE 1 PATCH: 4 CREAM TOPICAL at 01:26

## 2022-06-23 RX ADMIN — Medication 4: at 08:29

## 2022-06-23 RX ADMIN — Medication 10 UNIT(S): at 17:29

## 2022-06-23 RX ADMIN — EVEROLIMUS 4 MILLIGRAM(S): 10 TABLET ORAL at 20:30

## 2022-06-23 RX ADMIN — TAMSULOSIN HYDROCHLORIDE 0.4 MILLIGRAM(S): 0.4 CAPSULE ORAL at 22:22

## 2022-06-23 RX ADMIN — EVEROLIMUS 4 MILLIGRAM(S): 10 TABLET ORAL at 08:34

## 2022-06-23 RX ADMIN — Medication 1 GRAM(S): at 05:32

## 2022-06-23 RX ADMIN — Medication 10 UNIT(S): at 08:29

## 2022-06-23 RX ADMIN — HEPARIN SODIUM 5000 UNIT(S): 5000 INJECTION INTRAVENOUS; SUBCUTANEOUS at 05:25

## 2022-06-23 RX ADMIN — INSULIN GLARGINE 14 UNIT(S): 100 INJECTION, SOLUTION SUBCUTANEOUS at 22:26

## 2022-06-23 RX ADMIN — Medication 5 MILLIGRAM(S): at 05:26

## 2022-06-23 NOTE — PROGRESS NOTE ADULT - SUBJECTIVE AND OBJECTIVE BOX
Transplant Surgery - Multidisciplinary Rounds  --------------------------------------------------------------  OLT 7/2/2020    Present: Patient seen and examined with multidisciplinary Transplant team including  Surgeon: Dr. Gray. Hepatologist: Dr. Lee, transplant pharmacist ANDRZEJ Blanca, ZBIGNIEW Hurley and unit RNs during AM rounds.   Disciplines not in attendance will be notified of the plan.     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    s/p OLT 7/2/20 (course c/b VRE bacteremia, L foot OM, CNI toxicity to both Tacrolimus and Cyclosporine; switched to Everolimus 7/27/20    Admitted from 12/20/21 to 1/6/22 s/p fall at home, no LOC or injuries.  - Found to be +Covid w/ CT chest findings concerning for beginnings of multifocal PNA. Received remdesivir and mononclonal antibodies and on dexamethasone (12/29-1/7).   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 PCP prophylaxis changed from bactrim to atovaquone for LFTs.   -Patient was found to have DVT on L femoral vein and started on full treatment AC, now on Eliquis 5mg BID.     ------------  Transferred to [facility] from [facility] on 6/16/2022 where he was admitted for multiple falls over 24hr period.  +HA, +neck pain, +L rib pain.  Imaging revealed:  -C5-C7 mod-severe spinal stenosis  -L rib fx  -5mm distal L ureteral stone  -diarrhea    Interval Events:  - Afebrile, VSS  - Continues to be weak. Ambulating in room with walker due to b/l LE weakness limiting mobility. Wants to go home as opposed to rehab but willing to be evaluated for acute rehab  - Neuro recommending outpatient follow-up  Will need EMG/NCS  - Hyperglycemia: Insulin regimen adjusted  -LFTs stable    Immunosuppression:  Everolimus 4mg BID, MMF 250mg bid (due to GI symptoms), pred 5QD  Potential Discharge date: pending clinical stability  Education:  Medications  Plan of care:  See Below       MEDICATIONS  (STANDING):  dextrose 5%. 1000 milliLiter(s) (100 mL/Hr) IV Continuous <Continuous>  dextrose 5%. 1000 milliLiter(s) (50 mL/Hr) IV Continuous <Continuous>  dextrose 50% Injectable 25 Gram(s) IV Push once  dextrose 50% Injectable 12.5 Gram(s) IV Push once  dextrose 50% Injectable 25 Gram(s) IV Push once  everolimus (ZORTRESS) 4 milliGRAM(s) Oral <User Schedule>  folic acid 1 milliGRAM(s) Oral daily  glucagon  Injectable 1 milliGRAM(s) IntraMuscular once  heparin   Injectable 5000 Unit(s) SubCutaneous every 12 hours  insulin glargine Injectable (LANTUS) 12 Unit(s) SubCutaneous at bedtime  insulin lispro (ADMELOG) corrective regimen sliding scale   SubCutaneous three times a day before meals  insulin lispro (ADMELOG) corrective regimen sliding scale   SubCutaneous at bedtime  insulin lispro Injectable (ADMELOG) 10 Unit(s) SubCutaneous three times a day before meals  lidocaine   4% Patch 1 Patch Transdermal daily  lidocaine   4% Patch 1 Patch Transdermal daily  mycophenolate mofetil 250 milliGRAM(s) Oral two times a day  omega-3-Acid Ethyl Esters 1 Gram(s) Oral two times a day  pantoprazole    Tablet 40 milliGRAM(s) Oral before breakfast  polyethylene glycol 3350 17 Gram(s) Oral daily  predniSONE   Tablet 5 milliGRAM(s) Oral daily  sertraline 100 milliGRAM(s) Oral daily  tamsulosin 0.4 milliGRAM(s) Oral at bedtime    MEDICATIONS  (PRN):  artificial  tears Solution 1 Drop(s) Both EYES four times a day PRN Dry Eyes  dextrose Oral Gel 15 Gram(s) Oral once PRN Blood Glucose LESS THAN 70 milliGRAM(s)/deciliter  traMADol 25 milliGRAM(s) Oral every 12 hours PRN Severe Pain (7 - 10)      PAST MEDICAL & SURGICAL HISTORY:  Diabetes      Hypercholesteremia      Neuropathy      Hypertension      Renal stones  25 years ago      Fatty liver disease, nonalcoholic      Obesity      Hepatic encephalopathy      GERD with esophagitis  Gastritis &amp; Non Bleeding Ulcers      GIB (gastrointestinal bleeding)      S/P cholecystectomy          Vital Signs Last 24 Hrs  T(C): 36.7 (22 Jun 2022 09:00), Max: 36.9 (22 Jun 2022 01:00)  T(F): 98.1 (22 Jun 2022 09:00), Max: 98.5 (22 Jun 2022 01:00)  HR: 78 (22 Jun 2022 09:00) (73 - 80)  BP: 137/73 (22 Jun 2022 09:00) (137/73 - 156/74)  BP(mean): --  RR: 18 (22 Jun 2022 09:00) (18 - 18)  SpO2: 96% (22 Jun 2022 09:00) (95% - 98%)    I&O's Summary    21 Jun 2022 07:01  -  22 Jun 2022 07:00  --------------------------------------------------------  IN: 1320 mL / OUT: 2050 mL / NET: -730 mL    22 Jun 2022 07:01  -  22 Jun 2022 12:30  --------------------------------------------------------  IN: 340 mL / OUT: 400 mL / NET: -60 mL                              9.4    3.21  )-----------( 159      ( 22 Jun 2022 06:19 )             29.3     06-22    138  |  108  |  41<H>  ----------------------------<  244<H>  4.6   |  20<L>  |  1.96<H>    Ca    8.9      22 Jun 2022 06:19  Phos  3.3     06-22  Mg     1.8     06-22    TPro  6.4  /  Alb  3.2<L>  /  TBili  0.2  /  DBili  x   /  AST  70<H>  /  ALT  146<H>  /  AlkPhos  122<H>  06-22          Culture - Blood (collected 06-16-22 @ 17:21)  Source: .Blood Blood  Final Report (06-21-22 @ 23:00):    No Growth Final    Culture - Blood (collected 06-16-22 @ 17:21)  Source: .Blood Blood  Final Report (06-21-22 @ 23:00):    No Growth Final    Culture - Urine (collected 06-16-22 @ 10:07)  Source: Clean Catch Clean Catch (Midstream)  Final Report (06-17-22 @ 14:27):    <10,000 CFU/mL Normal Urogenital Shantel    Culture - Urine (collected 06-16-22 @ 00:52)  Source: Clean Catch Clean Catch (Midstream)  Final Report (06-16-22 @ 23:14):    <10,000 CFU/mL Normal Urogenital Shantel         Review of systems  Gen: No weight changes, fatigue, fevers/chills, see hpi  Skin: No rashes  Head/Eyes/Ears/Mouth: No headache; Normal hearing; Normal vision w/o blurriness; No sinus pain/discomfort, sore throat  Respiratory: No dyspnea, cough, wheezing, hemoptysis  CV: No chest pain, PND, orthopnea  GI:  no diarrhea, constipation, nausea, vomiting, melena, hematochezia  : No increased frequency, dysuria, hematuria, nocturia  MSK: see hpi, no edema  Neuro: No dizziness/lightheadedness, weakness, seizures, numbness, tingling  Heme: No easy bruising or bleeding  Endo: No heat/cold intolerance  Psych: No significant nervousness, anxiety, stress, depression  All other systems were reviewed and are negative, except as noted.      PHYSICAL EXAM:  Constitutional: Well developed / well nourished  Eyes: anicteric, PERRLA  ENMT: nc/at, no thrush, loose front tooth  Neck: supple  Respiratory: CTA B/L  Cardiovascular: RRR.  Gastrointestinal: Soft abdomen, ND/NT. well healed incisional scar  Genitourinary: Voiding spontaneously  Extremities: SCD's in place and working bilaterally, no calf TTP. no edema. L rib pain to palpation  Vascular: Palpable dp pulses bilaterally.   Neurological: A&O x3  Skin: no rashes, ulcerations, lesions  Musculoskeletal: Moving all extremities, decreased strength throughout  Psychiatric: Responsive     Transplant Surgery - Multidisciplinary Rounds  --------------------------------------------------------------  OLT 7/2/2020    Present: Patient seen and examined with multidisciplinary Transplant team including  Surgeon: Dr. Gray. Hepatologist: Dr. Lee, transplant pharmacist ANDRZEJ Blanca, ZBIGNIEW Hurley and unit RNs during AM rounds.   Disciplines not in attendance will be notified of the plan.     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    s/p OLT 7/2/20 (course c/b VRE bacteremia, L foot OM, CNI toxicity to both Tacrolimus and Cyclosporine; switched to Everolimus 7/27/20    Admitted from 12/20/21 to 1/6/22 s/p fall at home, no LOC or injuries.  - Found to be +Covid w/ CT chest findings concerning for beginnings of multifocal PNA. Received remdesivir and mononclonal antibodies and on dexamethasone (12/29-1/7).   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 PCP prophylaxis changed from bactrim to atovaquone for LFTs.   -Patient was found to have DVT on L femoral vein and started on full treatment AC, now on Eliquis 5mg BID.     ------------  Transferred to [facility] from [facility] on 6/16/2022 where he was admitted for multiple falls over 24hr period.  +HA, +neck pain, +L rib pain.  Imaging revealed:  -C5-C7 mod-severe spinal stenosis  -L rib fx  -5mm distal L ureteral stone  -diarrhea    Interval Events:  - Afebrile, VSS  - Continues to be weak.  Willing to go to Acute rehab  - Neuro recommending outpatient follow-up  Will need EMG/NCS  - Hyperglycemia: Insulin regimen adjusted  - LFTs stable    Immunosuppression:  Everolimus 4mg BID, MMF 250mg bid (due to GI symptoms), pred 5mg QD  Potential Discharge date: pending acceptance at acute rehab  Education:  Medications  Plan of care:  See Below       MEDICATIONS  (STANDING):  dextrose 5%. 1000 milliLiter(s) (100 mL/Hr) IV Continuous <Continuous>  dextrose 5%. 1000 milliLiter(s) (50 mL/Hr) IV Continuous <Continuous>  dextrose 50% Injectable 25 Gram(s) IV Push once  dextrose 50% Injectable 12.5 Gram(s) IV Push once  dextrose 50% Injectable 25 Gram(s) IV Push once  everolimus (ZORTRESS) 4 milliGRAM(s) Oral <User Schedule>  folic acid 1 milliGRAM(s) Oral daily  glucagon  Injectable 1 milliGRAM(s) IntraMuscular once  heparin   Injectable 5000 Unit(s) SubCutaneous every 12 hours  insulin glargine Injectable (LANTUS) 12 Unit(s) SubCutaneous at bedtime  insulin lispro (ADMELOG) corrective regimen sliding scale   SubCutaneous three times a day before meals  insulin lispro (ADMELOG) corrective regimen sliding scale   SubCutaneous at bedtime  insulin lispro Injectable (ADMELOG) 10 Unit(s) SubCutaneous three times a day before meals  lidocaine   4% Patch 1 Patch Transdermal daily  lidocaine   4% Patch 1 Patch Transdermal daily  mycophenolate mofetil 250 milliGRAM(s) Oral two times a day  omega-3-Acid Ethyl Esters 1 Gram(s) Oral two times a day  pantoprazole    Tablet 40 milliGRAM(s) Oral before breakfast  polyethylene glycol 3350 17 Gram(s) Oral daily  predniSONE   Tablet 5 milliGRAM(s) Oral daily  sertraline 100 milliGRAM(s) Oral daily  tamsulosin 0.4 milliGRAM(s) Oral at bedtime    MEDICATIONS  (PRN):  artificial  tears Solution 1 Drop(s) Both EYES four times a day PRN Dry Eyes  dextrose Oral Gel 15 Gram(s) Oral once PRN Blood Glucose LESS THAN 70 milliGRAM(s)/deciliter  traMADol 25 milliGRAM(s) Oral every 12 hours PRN Severe Pain (7 - 10)      PAST MEDICAL & SURGICAL HISTORY:  Diabetes      Hypercholesteremia      Neuropathy      Hypertension      Renal stones  25 years ago      Fatty liver disease, nonalcoholic      Obesity      Hepatic encephalopathy      GERD with esophagitis  Gastritis &amp; Non Bleeding Ulcers      GIB (gastrointestinal bleeding)      S/P cholecystectomy          Vital Signs Last 24 Hrs  T(C): 37.1 (23 Jun 2022 09:00), Max: 37.2 (23 Jun 2022 05:00)  T(F): 98.8 (23 Jun 2022 09:00), Max: 99 (23 Jun 2022 05:00)  HR: 78 (23 Jun 2022 09:00) (70 - 87)  BP: 139/70 (23 Jun 2022 09:00) (139/70 - 158/80)  BP(mean): --  RR: 18 (23 Jun 2022 09:00) (18 - 18)  SpO2: 94% (23 Jun 2022 09:00) (94% - 97%)    I&O's Summary    22 Jun 2022 07:01  -  23 Jun 2022 07:00  --------------------------------------------------------  IN: 1310 mL / OUT: 2500 mL / NET: -1190 mL    23 Jun 2022 07:01  -  23 Jun 2022 12:44  --------------------------------------------------------  IN: 340 mL / OUT: 700 mL / NET: -360 mL                              9.3    3.63  )-----------( 149      ( 23 Jun 2022 05:59 )             29.3     06-23    140  |  106  |  42<H>  ----------------------------<  238<H>  4.6   |  21<L>  |  2.01<H>    Ca    9.0      23 Jun 2022 05:59  Phos  3.6     06-23  Mg     2.0     06-23    TPro  6.4  /  Alb  3.2<L>  /  TBili  0.2  /  DBili  x   /  AST  49<H>  /  ALT  126<H>  /  AlkPhos  125<H>  06-23          Culture - Blood (collected 06-16-22 @ 17:21)  Source: .Blood Blood  Final Report (06-21-22 @ 23:00):    No Growth Final    Culture - Blood (collected 06-16-22 @ 17:21)  Source: .Blood Blood  Final Report (06-21-22 @ 23:00):    No Growth Final       Review of systems  Gen: No weight changes, fatigue, fevers/chills, see hpi  Skin: No rashes  Head/Eyes/Ears/Mouth: No headache; Normal hearing; Normal vision w/o blurriness; No sinus pain/discomfort, sore throat  Respiratory: No dyspnea, cough, wheezing, hemoptysis  CV: No chest pain, PND, orthopnea  GI:  no diarrhea, constipation, nausea, vomiting, melena, hematochezia  : No increased frequency, dysuria, hematuria, nocturia  MSK: see hpi, no edema  Neuro: No dizziness/lightheadedness, weakness, seizures, numbness, tingling  Heme: No easy bruising or bleeding  Endo: No heat/cold intolerance  Psych: No significant nervousness, anxiety, stress, depression  All other systems were reviewed and are negative, except as noted.      PHYSICAL EXAM:  Constitutional: Well developed / well nourished  Eyes: anicteric, PERRLA  ENMT: nc/at, no thrush, loose front tooth  Neck: supple  Respiratory: CTA B/L  Cardiovascular: RRR.  Gastrointestinal: Soft abdomen, ND/NT. well healed incisional scar  Genitourinary: Voiding spontaneously  Extremities: SCD's in place and working bilaterally, no calf TTP. no edema. L rib pain to palpation  Vascular: Palpable dp pulses bilaterally.   Neurological: A&O x3  Skin: no rashes, ulcerations, lesions  Musculoskeletal: Moving all extremities, decreased strength throughout  Psychiatric: Responsive

## 2022-06-23 NOTE — PROGRESS NOTE ADULT - ASSESSMENT
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    s/p OLT 7/2/20 (course c/b VRE bacteremia, L Foot OM, CNI toxicity to both Tacrolimus and Cyclosporine; switched to Everolimus 7/27/20).  Recent syncope and fall, COVID all in 12/2022.  Transferred s/p fall from [place]    s/p fall   - CT neck with mod-severe spinal stenosis with associated pain. Neuro/NeuroSx teams consulted, appreciate input  - MRI neck non-con with mod canal stenosis  - MRI lumbar completed.    - Neurosurgery: Can pursue rest of workup in outpatient setting. Can be referred to ortho spine and neurology (Dr Viera: [phone]) for EMG/NCS within 1-2 weeks after discharge. Given likely chronicity of pathology, low current clinical utility for steroids  - Continue PT/OT, pain management, fall precautions . Physiatry consult to asses potential for acute rehab   - no cardiac concerns at this time  - Dental consult appreciated Rec; No signs of acute odontogenic infection. FU outpt for comprehensive dental care    L ureteral stone with hydronephrosis  -Appreciate  input  -continue PO/IV hydration and Flomax in hopes stone will pass  -Renal U/S with no acute stone, mild hydro   -mild MISA on arrival, Cr trending down    s/p OLT  - LFTs persist slightly elevated  - Atorvastatin DCd 6/21  -SQH/SCDs    Immunosuppression  -continue Everolimus per level, MMF  250BID, Pred 5  -PPx: Valcyte (recent low level viremia, Transplant ID to review), PPI    DM:  - Elevated glucose  - Lantus 10Units and premeal to 8untis  - Continue ISS  - Diabetic diet    Dispo:  - pending Physiatry eval for acute rehab   66M with h/o IDDM, HLD, obesity, HFpEF with mild LV diastolic dysfunction, MGUS, AOCD, with a history of duodenal ulcer as well as GAVE and duodenal AVM s/p APC (last on 10/11/19), 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    s/p OLT 7/2/20 (course c/b VRE bacteremia, L Foot OM, CNI toxicity to both Tacrolimus and Cyclosporine; switched to Everolimus 7/27/20).  Recent syncope and fall, COVID all in 12/2022.  Transferred s/p fall from [place]    s/p fall   - CT neck with mod-severe spinal stenosis with associated pain. Neuro/NeuroSx teams consulted, appreciate input  - MRI neck non-con with mod canal stenosis  - MRI lumbar completed.    - Dental consult appreciated Rec; No signs of acute odontogenic infection. FU outpt for comprehensive dental care  - Neurosurgery: Can pursue rest of workup in outpatient setting. Can be referred to ortho spine and neurology (Dr Viera: [phone] [phone]) for EMG/NCS within 1-2 weeks after discharge. Given likely chronicity of pathology, low current clinical utility for steroids  - Continue PT/OT, pain management, fall precautions . Physiatry recommending Acute rehab. CM submitted request    L ureteral stone with hydronephrosis  -Appreciate  input  -continue PO/IV hydration and Flomax in hopes stone will pass  -Renal U/S with no acute stone, mild hydro   -mild MISA on arrival, Cr trending down/stable. Urine clear    s/p OLT  - LFTs persist slightly elevated  - Atorvastatin DCd 6/21  -SQH/SCDs  - Daily CBC, CMP, INR, Mag, Phos, Everolimus level    Immunosuppression  -continue Everolimus per level, MMF  250BID, Pred 5  -PPx: Valcyte (recent low level viremia, Transplant ID following), PPI    DM:  - glucose improving  - Increase Lantus to 14Units qpm. Continue Premeal admelog 10units  - Continue ISS  - Diabetic diet    Dispo:  - pending acceptance at Acute Rehab

## 2022-06-23 NOTE — PROGRESS NOTE ADULT - SUBJECTIVE AND OBJECTIVE BOX
[facility]  Ramón Falk PA-C  [facility] [place] [address]  [phone]      INTERVAL HPI/OVERNIGHT EVENTS:  pt seen and examined, no new events  tolerating diet, +BM    MEDICATIONS  (STANDING):  atorvastatin 80 milliGRAM(s) Oral at bedtime  dextrose 5%. 1000 milliLiter(s) (100 mL/Hr) IV Continuous <Continuous>  dextrose 5%. 1000 milliLiter(s) (50 mL/Hr) IV Continuous <Continuous>  dextrose 50% Injectable 25 Gram(s) IV Push once  dextrose 50% Injectable 12.5 Gram(s) IV Push once  dextrose 50% Injectable 25 Gram(s) IV Push once  everolimus (ZORTRESS) 4 milliGRAM(s) Oral <User Schedule>  folic acid 1 milliGRAM(s) Oral daily  glucagon  Injectable 1 milliGRAM(s) IntraMuscular once  heparin   Injectable 5000 Unit(s) SubCutaneous every 12 hours  insulin glargine Injectable (LANTUS) 8 Unit(s) SubCutaneous at bedtime  insulin lispro (ADMELOG) corrective regimen sliding scale   SubCutaneous three times a day before meals  insulin lispro (ADMELOG) corrective regimen sliding scale   SubCutaneous at bedtime  insulin lispro Injectable (ADMELOG) 6 Unit(s) SubCutaneous three times a day before meals  lidocaine   4% Patch 1 Patch Transdermal daily  lidocaine   4% Patch 1 Patch Transdermal daily  mycophenolate mofetil 250 milliGRAM(s) Oral two times a day  omega-3-Acid Ethyl Esters 1 Gram(s) Oral two times a day  pantoprazole    Tablet 40 milliGRAM(s) Oral before breakfast  polyethylene glycol 3350 17 Gram(s) Oral once  predniSONE   Tablet 5 milliGRAM(s) Oral daily  sertraline 100 milliGRAM(s) Oral daily  sodium chloride 0.9%. 1000 milliLiter(s) (50 mL/Hr) IV Continuous <Continuous>  tamsulosin 0.4 milliGRAM(s) Oral at bedtime    MEDICATIONS  (PRN):  dextrose Oral Gel 15 Gram(s) Oral once PRN Blood Glucose LESS THAN 70 milliGRAM(s)/deciliter  traMADol 25 milliGRAM(s) Oral every 12 hours PRN Severe Pain (7 - 10)      Allergies    codeine (Anaphylaxis)    Intolerances        ROS:   General:  No wt loss, fevers, chills, night sweats, fatigue,   Eyes:  Good vision, no reported pain  ENT:  No sore throat, pain, runny nose, dysphagia  CV:  No pain, palpitations, hypo/hypertension  Resp:  No dyspnea, cough, tachypnea, wheezing  GI:  No pain, No nausea, No vomiting, No diarrhea, No constipation, No weight loss, No fever, No pruritis, No rectal bleeding, No tarry stools, No dysphagia,  :  No pain, bleeding, incontinence, nocturia  Muscle:  No pain, weakness  Neuro:  No weakness, tingling, memory problems  Psych:  No fatigue, insomnia, mood problems, depression  Endocrine:  No polyuria, polydipsia, cold/heat intolerance  Heme:  No petechiae, ecchymosis, easy bruisability  Skin:  No rash, tattoos, scars, edema      PHYSICAL EXAM:   Vital Signs Last 24 Hrs  T(C): 37.1 (2022 09:00), Max: 37.2 (2022 05:00)  T(F): 98.8 (2022 09:00), Max: 99 (2022 05:00)  HR: 78 (2022 09:00) (70 - 87)  BP: 139/70 (2022 09:00) (139/70 - 158/80)  BP(mean): --  RR: 18 (2022 09:00) (18 - 18)  SpO2: 94% (2022 09:00) (94% - 97%)  Daily     Daily Weight in k.5 (2022 21:06)    GENERAL:  Appears stated age,   HEENT:  NC/AT,    CHEST:  Full & symmetric excursion,   HEART:  Regular rhythm,  ABDOMEN:  Soft, non-tender, non-distended,  EXTEREMITIES:  no cyanosis  SKIN:  No rash  NEURO:  Alert,       LABS:                        9.3    3.63  )-----------( 149      ( 2022 05:59 )             29.3   06-23    140  |  106  |  42<H>  ----------------------------<  238<H>  4.6   |  21<L>  |  2.01<H>    Ca    9.0      2022 05:59  Phos  3.6       Mg     2.0         TPro  6.4  /  Alb  3.2<L>  /  TBili  0.2  /  DBili  x   /  AST  49<H>  /  ALT  126<H>  /  AlkPhos  125<H>            RADIOLOGY & ADDITIONAL TESTS:

## 2022-06-23 NOTE — PROGRESS NOTE ADULT - ASSESSMENT
66M w/ PMHx JEAN cirrhosis s/p OLT 7/2/20 (course c/b VRE bacteremia, CNI toxicity to both tacro and cyclosporine; switched to everolimus 7/27/20), HLD, IDDM, obesity, HFpEF presenting w/ fall and ureteral stone to [facility] on 6/15/22, now transferred to [facility] for further management.     Impression:   #Fall, rib fracture: now second admission for falls c/b rib fracture. Per neurologic evaluation possibly 2/2 spinal canal stenosis     #Ureteral stone c/b hydronephrosis, stable/improved  #Elevated liver enzymes: liver biopsy 12/30/21 without e/o rejection, found to have NRH  #JEAN cirrhosis s/p LT  #CMV viremia   #DM  #Anemia    Recommendations:  - appreciate neuro evaluation re: recurrent falls, stating likely 2/2 spinal canal stenosis / cervical myelopathy  (NSGY with no plans for surgical intervention) vs may need EMG evaluation as an outpatient  - immunosuppression: everolimus 4 mg BID (dose by level), and CellCept 250 mg BID, and prednisone 5mg daily  - d/c valcyte per ID   - will need outpatient neurology follow up   - appreciate dispo planning; recommended for acute rehab      Recommendations incomplete until finalized by attending signature/attestation to note.    Thank you for involving us in the care of this patient, please reach out if any further questions.     Ky Ram MD  Gastroenterology/Hepatology Fellow, PGY5    Available on [employer] Teams  [phone] ([facility])  [phone] ([facility])  Please contact on call fellow weekdays after 5pm-7am and weekends: [phone]

## 2022-06-23 NOTE — PROGRESS NOTE ADULT - SUBJECTIVE AND OBJECTIVE BOX
Interval Events:   NAEON, afebrile HD stable  Doing well, denies complaints  Recommended for acute rehab      ROS:   12 point review of systems performed and negative except otherwise noted in HPI.    Hospital Medications:  atorvastatin 80 milliGRAM(s) Oral at bedtime  dextrose 5%. 1000 milliLiter(s) IV Continuous <Continuous>  dextrose 5%. 1000 milliLiter(s) IV Continuous <Continuous>  dextrose 50% Injectable 25 Gram(s) IV Push once  dextrose 50% Injectable 12.5 Gram(s) IV Push once  dextrose 50% Injectable 25 Gram(s) IV Push once  dextrose Oral Gel 15 Gram(s) Oral once PRN  everolimus (ZORTRESS) 4 milliGRAM(s) Oral <User Schedule>  folic acid 1 milliGRAM(s) Oral daily  glucagon  Injectable 1 milliGRAM(s) IntraMuscular once  heparin   Injectable 5000 Unit(s) SubCutaneous every 12 hours  insulin glargine Injectable (LANTUS) 8 Unit(s) SubCutaneous at bedtime  insulin lispro (ADMELOG) corrective regimen sliding scale   SubCutaneous three times a day before meals  insulin lispro (ADMELOG) corrective regimen sliding scale   SubCutaneous at bedtime  insulin lispro Injectable (ADMELOG) 6 Unit(s) SubCutaneous three times a day before meals  lidocaine   4% Patch 1 Patch Transdermal daily  lidocaine   4% Patch 1 Patch Transdermal daily  mycophenolate mofetil 250 milliGRAM(s) Oral two times a day  omega-3-Acid Ethyl Esters 1 Gram(s) Oral two times a day  pantoprazole    Tablet 40 milliGRAM(s) Oral before breakfast  predniSONE   Tablet 5 milliGRAM(s) Oral daily  sertraline 100 milliGRAM(s) Oral daily  sodium chloride 0.9%. 1000 milliLiter(s) IV Continuous <Continuous>  tamsulosin 0.4 milliGRAM(s) Oral at bedtime  traMADol 25 milliGRAM(s) Oral every 12 hours PRN      PHYSICAL EXAM:   Vital Signs Last 24 Hrs  T(C): 37.1 (23 Jun 2022 09:00), Max: 37.2 (23 Jun 2022 05:00)  T(F): 98.8 (23 Jun 2022 09:00), Max: 99 (23 Jun 2022 05:00)  HR: 78 (23 Jun 2022 09:00) (70 - 87)  BP: 139/70 (23 Jun 2022 09:00) (139/70 - 158/80)  BP(mean): --  RR: 18 (23 Jun 2022 09:00) (18 - 18)  SpO2: 94% (23 Jun 2022 09:00) (94% - 97%)    GENERAL: no acute distress  NEURO: alert  HEENT: NCAT, no conjunctival pallor  appreciated  CHEST: no respiratory distress, no accessory muscle use  CARDIAC: regular rate, +S1/S2  ABDOMEN: soft, nontender, no rebound or guarding  EXTREMITIES: warm, well perfused  SKIN: no lesions noted      LABS:                        9.3    3.63  )-----------( 149      ( 23 Jun 2022 05:59 )             29.3     06-23    140  |  106  |  42<H>  ----------------------------<  238<H>  4.6   |  21<L>  |  2.01<H>    Ca    9.0      23 Jun 2022 05:59  Phos  3.6     06-23  Mg     2.0     06-23    TPro  6.4  /  Alb  3.2<L>  /  TBili  0.2  /  DBili  x   /  AST  49<H>  /  ALT  126<H>  /  AlkPhos  125<H>  06-23    LIVER FUNCTIONS - ( 23 Jun 2022 05:59 )  Alb: 3.2 g/dL / Pro: 6.4 g/dL / ALK PHOS: 125 U/L / ALT: 126 U/L / AST: 49 U/L / GGT: x                               Interval Diagnostic Studies: see sunrise for full report

## 2022-06-24 LAB
ALBUMIN SERPL ELPH-MCNC: 3.4 G/DL — SIGNIFICANT CHANGE UP (ref 3.3–5)
ALP SERPL-CCNC: 132 U/L — HIGH (ref 40–120)
ALT FLD-CCNC: 144 U/L — HIGH (ref 10–45)
ANION GAP SERPL CALC-SCNC: 14 MMOL/L — SIGNIFICANT CHANGE UP (ref 5–17)
AST SERPL-CCNC: 65 U/L — HIGH (ref 10–40)
BASOPHILS # BLD AUTO: 0.02 K/UL — SIGNIFICANT CHANGE UP (ref 0–0.2)
BASOPHILS NFR BLD AUTO: 0.5 % — SIGNIFICANT CHANGE UP (ref 0–2)
BILIRUB SERPL-MCNC: 0.2 MG/DL — SIGNIFICANT CHANGE UP (ref 0.2–1.2)
BUN SERPL-MCNC: 41 MG/DL — HIGH (ref 7–23)
CALCIUM SERPL-MCNC: 9.2 MG/DL — SIGNIFICANT CHANGE UP (ref 8.4–10.5)
CHLORIDE SERPL-SCNC: 108 MMOL/L — SIGNIFICANT CHANGE UP (ref 96–108)
CO2 SERPL-SCNC: 21 MMOL/L — LOW (ref 22–31)
CREAT SERPL-MCNC: 1.98 MG/DL — HIGH (ref 0.5–1.3)
EGFR: 37 ML/MIN/1.73M2 — LOW
EOSINOPHIL # BLD AUTO: 0.06 K/UL — SIGNIFICANT CHANGE UP (ref 0–0.5)
EOSINOPHIL NFR BLD AUTO: 1.4 % — SIGNIFICANT CHANGE UP (ref 0–6)
EVEROLIMUS, WHOLE BLOOD RESULT: 7.5 NG/ML — SIGNIFICANT CHANGE UP (ref 3–8)
GLUCOSE BLDC GLUCOMTR-MCNC: 138 MG/DL — HIGH (ref 70–99)
GLUCOSE BLDC GLUCOMTR-MCNC: 192 MG/DL — HIGH (ref 70–99)
GLUCOSE BLDC GLUCOMTR-MCNC: 194 MG/DL — HIGH (ref 70–99)
GLUCOSE BLDC GLUCOMTR-MCNC: 215 MG/DL — HIGH (ref 70–99)
GLUCOSE SERPL-MCNC: 182 MG/DL — HIGH (ref 70–99)
HCT VFR BLD CALC: 30.7 % — LOW (ref 39–50)
HGB BLD-MCNC: 9.6 G/DL — LOW (ref 13–17)
IMM GRANULOCYTES NFR BLD AUTO: 1 % — SIGNIFICANT CHANGE UP (ref 0–1.5)
LYMPHOCYTES # BLD AUTO: 0.79 K/UL — LOW (ref 1–3.3)
LYMPHOCYTES # BLD AUTO: 18.9 % — SIGNIFICANT CHANGE UP (ref 13–44)
MAGNESIUM SERPL-MCNC: 2 MG/DL — SIGNIFICANT CHANGE UP (ref 1.6–2.6)
MCHC RBC-ENTMCNC: 29.1 PG — SIGNIFICANT CHANGE UP (ref 27–34)
MCHC RBC-ENTMCNC: 31.3 GM/DL — LOW (ref 32–36)
MCV RBC AUTO: 93 FL — SIGNIFICANT CHANGE UP (ref 80–100)
MONOCYTES # BLD AUTO: 0.5 K/UL — SIGNIFICANT CHANGE UP (ref 0–0.9)
MONOCYTES NFR BLD AUTO: 11.9 % — SIGNIFICANT CHANGE UP (ref 2–14)
NEUTROPHILS # BLD AUTO: 2.78 K/UL — SIGNIFICANT CHANGE UP (ref 1.8–7.4)
NEUTROPHILS NFR BLD AUTO: 66.3 % — SIGNIFICANT CHANGE UP (ref 43–77)
NRBC # BLD: 0 /100 WBCS — SIGNIFICANT CHANGE UP (ref 0–0)
PHOSPHATE SERPL-MCNC: 3.5 MG/DL — SIGNIFICANT CHANGE UP (ref 2.5–4.5)
PLATELET # BLD AUTO: 155 K/UL — SIGNIFICANT CHANGE UP (ref 150–400)
POTASSIUM SERPL-MCNC: 4.8 MMOL/L — SIGNIFICANT CHANGE UP (ref 3.5–5.3)
POTASSIUM SERPL-SCNC: 4.8 MMOL/L — SIGNIFICANT CHANGE UP (ref 3.5–5.3)
PROT SERPL-MCNC: 6.8 G/DL — SIGNIFICANT CHANGE UP (ref 6–8.3)
RBC # BLD: 3.3 M/UL — LOW (ref 4.2–5.8)
RBC # FLD: 13.2 % — SIGNIFICANT CHANGE UP (ref 10.3–14.5)
SODIUM SERPL-SCNC: 143 MMOL/L — SIGNIFICANT CHANGE UP (ref 135–145)
WBC # BLD: 4.19 K/UL — SIGNIFICANT CHANGE UP (ref 3.8–10.5)
WBC # FLD AUTO: 4.19 K/UL — SIGNIFICANT CHANGE UP (ref 3.8–10.5)

## 2022-06-24 PROCEDURE — 99232 SBSQ HOSP IP/OBS MODERATE 35: CPT

## 2022-06-24 PROCEDURE — 99232 SBSQ HOSP IP/OBS MODERATE 35: CPT | Mod: GC

## 2022-06-24 RX ADMIN — Medication 1 GRAM(S): at 06:04

## 2022-06-24 RX ADMIN — Medication 10 UNIT(S): at 09:08

## 2022-06-24 RX ADMIN — EVEROLIMUS 4 MILLIGRAM(S): 10 TABLET ORAL at 09:12

## 2022-06-24 RX ADMIN — LIDOCAINE 1 PATCH: 4 CREAM TOPICAL at 12:13

## 2022-06-24 RX ADMIN — MYCOPHENOLATE MOFETIL 250 MILLIGRAM(S): 250 CAPSULE ORAL at 17:15

## 2022-06-24 RX ADMIN — SERTRALINE 100 MILLIGRAM(S): 25 TABLET, FILM COATED ORAL at 12:12

## 2022-06-24 RX ADMIN — LIDOCAINE 1 PATCH: 4 CREAM TOPICAL at 07:02

## 2022-06-24 RX ADMIN — LIDOCAINE 1 PATCH: 4 CREAM TOPICAL at 12:14

## 2022-06-24 RX ADMIN — PANTOPRAZOLE SODIUM 40 MILLIGRAM(S): 20 TABLET, DELAYED RELEASE ORAL at 06:03

## 2022-06-24 RX ADMIN — Medication 10 UNIT(S): at 12:52

## 2022-06-24 RX ADMIN — LIDOCAINE 1 PATCH: 4 CREAM TOPICAL at 00:00

## 2022-06-24 RX ADMIN — HEPARIN SODIUM 5000 UNIT(S): 5000 INJECTION INTRAVENOUS; SUBCUTANEOUS at 17:14

## 2022-06-24 RX ADMIN — Medication 10 UNIT(S): at 17:19

## 2022-06-24 RX ADMIN — Medication 2: at 12:52

## 2022-06-24 RX ADMIN — LIDOCAINE 1 PATCH: 4 CREAM TOPICAL at 19:00

## 2022-06-24 RX ADMIN — Medication 4: at 09:08

## 2022-06-24 RX ADMIN — Medication 5 MILLIGRAM(S): at 06:03

## 2022-06-24 RX ADMIN — TAMSULOSIN HYDROCHLORIDE 0.4 MILLIGRAM(S): 0.4 CAPSULE ORAL at 20:14

## 2022-06-24 RX ADMIN — INSULIN GLARGINE 14 UNIT(S): 100 INJECTION, SOLUTION SUBCUTANEOUS at 22:38

## 2022-06-24 RX ADMIN — Medication 1 GRAM(S): at 17:15

## 2022-06-24 RX ADMIN — EVEROLIMUS 4 MILLIGRAM(S): 10 TABLET ORAL at 20:14

## 2022-06-24 RX ADMIN — Medication 1 MILLIGRAM(S): at 12:13

## 2022-06-24 RX ADMIN — TRAMADOL HYDROCHLORIDE 25 MILLIGRAM(S): 50 TABLET ORAL at 10:40

## 2022-06-24 RX ADMIN — MYCOPHENOLATE MOFETIL 250 MILLIGRAM(S): 250 CAPSULE ORAL at 06:03

## 2022-06-24 RX ADMIN — TRAMADOL HYDROCHLORIDE 25 MILLIGRAM(S): 50 TABLET ORAL at 09:49

## 2022-06-24 RX ADMIN — HEPARIN SODIUM 5000 UNIT(S): 5000 INJECTION INTRAVENOUS; SUBCUTANEOUS at 06:04

## 2022-06-24 NOTE — OCCUPATIONAL THERAPY INITIAL EVALUATION ADULT - PERTINENT HX OF CURRENT PROBLEM, REHAB EVAL
65 y/o M He is transferred to [facility] from [facility] where he was admitted on 6/15/22 for multiple falls over 24hr period. As per pt he had 3 falls in 24hrs, he was on the floor for 3 hours after last fall and crawled to the phone to call for help. He is c/o head,neck,left side pain, and mild nausea on way to ED. On Ct he was found to have a 5 mm distal left ureteral calculus causing hydroureteronephrosis. cont' to additional comments

## 2022-06-24 NOTE — PROGRESS NOTE ADULT - SUBJECTIVE AND OBJECTIVE BOX
Transplant Surgery - Multidisciplinary Rounds  --------------------------------------------------------------  OLT 7/2/2020    Present: Patient seen and examined with multidisciplinary Transplant team including  Surgeon: Dr. Gray. Hepatologist: Dr. Lee, [facility] and unit RNs during AM rounds.   Disciplines not in attendance will be notified of the plan.     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    s/p OLT 7/2/20 (course c/b VRE bacteremia, L foot OM, CNI toxicity to both Tacrolimus and Cyclosporine; switched to Everolimus 7/27/20    Admitted from 12/20/21 to 1/6/22 s/p fall at home, no LOC or injuries.  - Found to be +Covid w/ CT chest findings concerning for beginnings of multifocal PNA. Received remdesivir and mononclonal antibodies and on dexamethasone (12/29-1/7).   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 PCP prophylaxis changed from bactrim to atovaquone for LFTs.   -Patient was found to have DVT on L femoral vein and started on full treatment AC, now on Eliquis 5mg BID.     ------------  Transferred to [facility] from [facility] on 6/16/2022 where he was admitted for multiple falls over 24hr period.  +HA, +neck pain, +L rib pain.  Imaging revealed:  -C5-C7 mod-severe spinal stenosis  -L rib fx  -5mm distal L ureteral stone  -diarrhea    Interval Events:  - Afebrile, VSS  -Continues to be weak.  Agreeable to go to rehab. Paperwork submitted to [facility] yesterday   -FS yesterday 200s, increased Lantus to 14U. On Premeal 10U. FS today a little improved.   -LFTs slightly up today.   -Neuro recommending outpatient follow-up  Will need EMG/NCS  - LFTs stable    Immunosuppression:  Everolimus 4mg BID, MMF 250mg bid (due to GI symptoms), pred 5mg QD  Potential Discharge date: pending acceptance at acute rehab  Education:  Medications  Plan of care:  See Below      MEDICATIONS  (STANDING):  dextrose 5%. 1000 milliLiter(s) (100 mL/Hr) IV Continuous <Continuous>  dextrose 5%. 1000 milliLiter(s) (50 mL/Hr) IV Continuous <Continuous>  dextrose 50% Injectable 25 Gram(s) IV Push once  dextrose 50% Injectable 12.5 Gram(s) IV Push once  dextrose 50% Injectable 25 Gram(s) IV Push once  everolimus (ZORTRESS) 4 milliGRAM(s) Oral <User Schedule>  folic acid 1 milliGRAM(s) Oral daily  glucagon  Injectable 1 milliGRAM(s) IntraMuscular once  heparin   Injectable 5000 Unit(s) SubCutaneous every 12 hours  insulin glargine Injectable (LANTUS) 14 Unit(s) SubCutaneous at bedtime  insulin lispro (ADMELOG) corrective regimen sliding scale   SubCutaneous three times a day before meals  insulin lispro (ADMELOG) corrective regimen sliding scale   SubCutaneous at bedtime  insulin lispro Injectable (ADMELOG) 10 Unit(s) SubCutaneous three times a day before meals  lidocaine   4% Patch 1 Patch Transdermal daily  lidocaine   4% Patch 1 Patch Transdermal daily  mycophenolate mofetil 250 milliGRAM(s) Oral two times a day  omega-3-Acid Ethyl Esters 1 Gram(s) Oral two times a day  pantoprazole    Tablet 40 milliGRAM(s) Oral before breakfast  polyethylene glycol 3350 17 Gram(s) Oral daily  predniSONE   Tablet 5 milliGRAM(s) Oral daily  sertraline 100 milliGRAM(s) Oral daily  tamsulosin 0.4 milliGRAM(s) Oral at bedtime    MEDICATIONS  (PRN):  artificial  tears Solution 1 Drop(s) Both EYES four times a day PRN Dry Eyes  dextrose Oral Gel 15 Gram(s) Oral once PRN Blood Glucose LESS THAN 70 milliGRAM(s)/deciliter  traMADol 25 milliGRAM(s) Oral every 12 hours PRN Severe Pain (7 - 10)      PAST MEDICAL & SURGICAL HISTORY:  Diabetes      Hypercholesteremia      Neuropathy      Hypertension      Renal stones  25 years ago      Fatty liver disease, nonalcoholic      Obesity      Hepatic encephalopathy      GERD with esophagitis  Gastritis &amp; Non Bleeding Ulcers      GIB (gastrointestinal bleeding)      S/P cholecystectomy          Vital Signs Last 24 Hrs  T(C): 37 (24 Jun 2022 04:57), Max: 37.1 (23 Jun 2022 13:00)  T(F): 98.6 (24 Jun 2022 04:57), Max: 98.8 (23 Jun 2022 17:00)  HR: 77 (24 Jun 2022 09:21) (72 - 83)  BP: 131/64 (24 Jun 2022 09:21) (131/64 - 164/84)  BP(mean): --  RR: 18 (24 Jun 2022 04:57) (18 - 18)  SpO2: 95% (24 Jun 2022 09:21) (94% - 98%)    I&O's Summary    23 Jun 2022 07:01  -  24 Jun 2022 07:00  --------------------------------------------------------  IN: 2220 mL / OUT: 2870 mL / NET: -650 mL    24 Jun 2022 07:01  -  24 Jun 2022 12:39  --------------------------------------------------------  IN: 120 mL / OUT: 200 mL / NET: -80 mL                              9.6    4.19  )-----------( 155      ( 24 Jun 2022 06:20 )             30.7     06-24    143  |  108  |  41<H>  ----------------------------<  182<H>  4.8   |  21<L>  |  1.98<H>    Ca    9.2      24 Jun 2022 06:20  Phos  3.5     06-24  Mg     2.0     06-24    TPro  6.8  /  Alb  3.4  /  TBili  0.2  /  DBili  x   /  AST  65<H>  /  ALT  144<H>  /  AlkPhos  132<H>  06-24       Review of systems  Gen: No weight changes, fatigue, fevers/chills, see hpi  Skin: No rashes  Head/Eyes/Ears/Mouth: No headache; Normal hearing; Normal vision w/o blurriness; No sinus pain/discomfort, sore throat  Respiratory: No dyspnea, cough, wheezing, hemoptysis  CV: No chest pain, PND, orthopnea  GI:  no diarrhea, constipation, nausea, vomiting, melena, hematochezia  : No increased frequency, dysuria, hematuria, nocturia  MSK: see hpi, no edema  Neuro: No dizziness/lightheadedness, weakness, seizures, numbness, tingling  Heme: No easy bruising or bleeding  Endo: No heat/cold intolerance  Psych: No significant nervousness, anxiety, stress, depression  All other systems were reviewed and are negative, except as noted.      PHYSICAL EXAM:  Constitutional: Well developed / well nourished  Eyes: anicteric, PERRLA  ENMT: nc/at, no thrush, loose front tooth  Neck: supple  Respiratory: CTA B/L  Cardiovascular: RRR.  Gastrointestinal: Soft abdomen, ND/NT. well healed incisional scar  Genitourinary: Voiding spontaneously  Extremities: SCD's in place and working bilaterally, no calf TTP. no edema. L rib pain to palpation  Vascular: Palpable dp pulses bilaterally.   Neurological: A&O x3  Skin: no rashes, ulcerations, lesions  Musculoskeletal: Moving all extremities, decreased strength throughout  Psychiatric: Responsive     Transplant Surgery - Multidisciplinary Rounds  --------------------------------------------------------------  OLT 7/2/2020    Present: Patient seen and examined with multidisciplinary Transplant team including  Surgeon: Dr. Gray. Hepatologist: Dr. Lee, [facility] and unit RNs during AM rounds.   Disciplines not in attendance will be notified of the plan.     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    s/p OLT 7/2/20 (course c/b VRE bacteremia, L foot OM, CNI toxicity to both Tacrolimus and Cyclosporine; switched to Everolimus 7/27/20    Admitted from 12/20/21 to 1/6/22 s/p fall at home, no LOC or injuries.  - Found to be +Covid w/ CT chest findings concerning for beginnings of multifocal PNA. Received remdesivir and mononclonal antibodies and on dexamethasone (12/29-1/7).   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 PCP prophylaxis changed from bactrim to atovaquone for LFTs.   -Patient was found to have DVT on L femoral vein and started on full treatment AC, now on Eliquis 5mg BID.     ------------  Transferred to [facility] from [facility] on 6/16/2022 where he was admitted for multiple falls over 24hr period.  +HA, +neck pain, +L rib pain.  Imaging revealed:  -C5-C7 mod-severe spinal stenosis  -L rib fx  -5mm distal L ureteral stone  -diarrhea    Interval Events:  - Afebrile, VSS  -Continues to be weak.  Agreeable to go to rehab. Paperwork submitted to [facility] yesterday   -FS yesterday 200s, increased Lantus to 14U. On Premeal 10U. FS today a little improved.   -LFTs slightly up today.   -Neuro recommending outpatient follow-up  Will need EMG/NCS      Immunosuppression:  Everolimus 4mg BID, MMF 250mg bid (due to GI symptoms), pred 5mg QD  Potential Discharge date: pending acceptance at acute rehab  Education:  Medications  Plan of care:  See Below      MEDICATIONS  (STANDING):  dextrose 5%. 1000 milliLiter(s) (100 mL/Hr) IV Continuous <Continuous>  dextrose 5%. 1000 milliLiter(s) (50 mL/Hr) IV Continuous <Continuous>  dextrose 50% Injectable 25 Gram(s) IV Push once  dextrose 50% Injectable 12.5 Gram(s) IV Push once  dextrose 50% Injectable 25 Gram(s) IV Push once  everolimus (ZORTRESS) 4 milliGRAM(s) Oral <User Schedule>  folic acid 1 milliGRAM(s) Oral daily  glucagon  Injectable 1 milliGRAM(s) IntraMuscular once  heparin   Injectable 5000 Unit(s) SubCutaneous every 12 hours  insulin glargine Injectable (LANTUS) 14 Unit(s) SubCutaneous at bedtime  insulin lispro (ADMELOG) corrective regimen sliding scale   SubCutaneous three times a day before meals  insulin lispro (ADMELOG) corrective regimen sliding scale   SubCutaneous at bedtime  insulin lispro Injectable (ADMELOG) 10 Unit(s) SubCutaneous three times a day before meals  lidocaine   4% Patch 1 Patch Transdermal daily  lidocaine   4% Patch 1 Patch Transdermal daily  mycophenolate mofetil 250 milliGRAM(s) Oral two times a day  omega-3-Acid Ethyl Esters 1 Gram(s) Oral two times a day  pantoprazole    Tablet 40 milliGRAM(s) Oral before breakfast  polyethylene glycol 3350 17 Gram(s) Oral daily  predniSONE   Tablet 5 milliGRAM(s) Oral daily  sertraline 100 milliGRAM(s) Oral daily  tamsulosin 0.4 milliGRAM(s) Oral at bedtime    MEDICATIONS  (PRN):  artificial  tears Solution 1 Drop(s) Both EYES four times a day PRN Dry Eyes  dextrose Oral Gel 15 Gram(s) Oral once PRN Blood Glucose LESS THAN 70 milliGRAM(s)/deciliter  traMADol 25 milliGRAM(s) Oral every 12 hours PRN Severe Pain (7 - 10)      PAST MEDICAL & SURGICAL HISTORY:  Diabetes      Hypercholesteremia      Neuropathy      Hypertension      Renal stones  25 years ago      Fatty liver disease, nonalcoholic      Obesity      Hepatic encephalopathy      GERD with esophagitis  Gastritis &amp; Non Bleeding Ulcers      GIB (gastrointestinal bleeding)      S/P cholecystectomy          Vital Signs Last 24 Hrs  T(C): 37 (24 Jun 2022 04:57), Max: 37.1 (23 Jun 2022 13:00)  T(F): 98.6 (24 Jun 2022 04:57), Max: 98.8 (23 Jun 2022 17:00)  HR: 77 (24 Jun 2022 09:21) (72 - 83)  BP: 131/64 (24 Jun 2022 09:21) (131/64 - 164/84)  BP(mean): --  RR: 18 (24 Jun 2022 04:57) (18 - 18)  SpO2: 95% (24 Jun 2022 09:21) (94% - 98%)    I&O's Summary    23 Jun 2022 07:01  -  24 Jun 2022 07:00  --------------------------------------------------------  IN: 2220 mL / OUT: 2870 mL / NET: -650 mL    24 Jun 2022 07:01  -  24 Jun 2022 12:39  --------------------------------------------------------  IN: 120 mL / OUT: 200 mL / NET: -80 mL                              9.6    4.19  )-----------( 155      ( 24 Jun 2022 06:20 )             30.7     06-24    143  |  108  |  41<H>  ----------------------------<  182<H>  4.8   |  21<L>  |  1.98<H>    Ca    9.2      24 Jun 2022 06:20  Phos  3.5     06-24  Mg     2.0     06-24    TPro  6.8  /  Alb  3.4  /  TBili  0.2  /  DBili  x   /  AST  65<H>  /  ALT  144<H>  /  AlkPhos  132<H>  06-24       Review of systems  Gen: No weight changes, fatigue, fevers/chills, see hpi  Skin: No rashes  Head/Eyes/Ears/Mouth: No headache; Normal hearing; Normal vision w/o blurriness; No sinus pain/discomfort, sore throat  Respiratory: No dyspnea, cough, wheezing, hemoptysis  CV: No chest pain, PND, orthopnea  GI:  no diarrhea, constipation, nausea, vomiting, melena, hematochezia  : No increased frequency, dysuria, hematuria, nocturia  MSK: see hpi, no edema  Neuro: No dizziness/lightheadedness, weakness, seizures, numbness, tingling  Heme: No easy bruising or bleeding  Endo: No heat/cold intolerance  Psych: No significant nervousness, anxiety, stress, depression  All other systems were reviewed and are negative, except as noted.      PHYSICAL EXAM:  Constitutional: Well developed / well nourished  Eyes: anicteric, PERRLA  ENMT: nc/at, no thrush, loose front tooth  Neck: supple  Respiratory: CTA B/L  Cardiovascular: RRR.  Gastrointestinal: Soft abdomen, ND/NT. well healed incisional scar  Genitourinary: Voiding spontaneously  Extremities: SCD's in place and working bilaterally, no calf TTP. no edema. L rib pain to palpation  Vascular: Palpable dp pulses bilaterally.   Neurological: A&O x3  Skin: no rashes, ulcerations, lesions  Musculoskeletal: Moving all extremities, decreased strength throughout  Psychiatric: Responsive

## 2022-06-24 NOTE — PROGRESS NOTE ADULT - SUBJECTIVE AND OBJECTIVE BOX
Interval Events:   NAEON, afebrile HD stable  Doing well, denies complaints  Recommended for acute rehab      ROS:   12 point review of systems performed and negative except otherwise noted in HPI.    Hospital Medications:  atorvastatin 80 milliGRAM(s) Oral at bedtime  dextrose 5%. 1000 milliLiter(s) IV Continuous <Continuous>  dextrose 5%. 1000 milliLiter(s) IV Continuous <Continuous>  dextrose 50% Injectable 25 Gram(s) IV Push once  dextrose 50% Injectable 12.5 Gram(s) IV Push once  dextrose 50% Injectable 25 Gram(s) IV Push once  dextrose Oral Gel 15 Gram(s) Oral once PRN  everolimus (ZORTRESS) 4 milliGRAM(s) Oral <User Schedule>  folic acid 1 milliGRAM(s) Oral daily  glucagon  Injectable 1 milliGRAM(s) IntraMuscular once  heparin   Injectable 5000 Unit(s) SubCutaneous every 12 hours  insulin glargine Injectable (LANTUS) 8 Unit(s) SubCutaneous at bedtime  insulin lispro (ADMELOG) corrective regimen sliding scale   SubCutaneous three times a day before meals  insulin lispro (ADMELOG) corrective regimen sliding scale   SubCutaneous at bedtime  insulin lispro Injectable (ADMELOG) 6 Unit(s) SubCutaneous three times a day before meals  lidocaine   4% Patch 1 Patch Transdermal daily  lidocaine   4% Patch 1 Patch Transdermal daily  mycophenolate mofetil 250 milliGRAM(s) Oral two times a day  omega-3-Acid Ethyl Esters 1 Gram(s) Oral two times a day  pantoprazole    Tablet 40 milliGRAM(s) Oral before breakfast  predniSONE   Tablet 5 milliGRAM(s) Oral daily  sertraline 100 milliGRAM(s) Oral daily  sodium chloride 0.9%. 1000 milliLiter(s) IV Continuous <Continuous>  tamsulosin 0.4 milliGRAM(s) Oral at bedtime  traMADol 25 milliGRAM(s) Oral every 12 hours PRN      PHYSICAL EXAM:   Vital Signs Last 24 Hrs  T(C): 37 (24 Jun 2022 04:57), Max: 37.1 (23 Jun 2022 13:00)  T(F): 98.6 (24 Jun 2022 04:57), Max: 98.8 (23 Jun 2022 17:00)  HR: 77 (24 Jun 2022 09:21) (72 - 83)  BP: 131/64 (24 Jun 2022 09:21) (131/64 - 164/84)  BP(mean): --  RR: 18 (24 Jun 2022 04:57) (18 - 18)  SpO2: 95% (24 Jun 2022 09:21) (94% - 98%)    GENERAL: no acute distress  NEURO: alert  HEENT: NCAT, no conjunctival pallor  appreciated  CHEST: no respiratory distress, no accessory muscle use  CARDIAC: regular rate, +S1/S2  ABDOMEN: soft, nontender, no rebound or guarding  EXTREMITIES: warm, well perfused  SKIN: no lesions noted        LABS:                        9.6    4.19  )-----------( 155      ( 24 Jun 2022 06:20 )             30.7     06-24    143  |  108  |  41<H>  ----------------------------<  182<H>  4.8   |  21<L>  |  1.98<H>    Ca    9.2      24 Jun 2022 06:20  Phos  3.5     06-24  Mg     2.0     06-24    TPro  6.8  /  Alb  3.4  /  TBili  0.2  /  DBili  x   /  AST  65<H>  /  ALT  144<H>  /  AlkPhos  132<H>  06-24    LIVER FUNCTIONS - ( 24 Jun 2022 06:20 )  Alb: 3.4 g/dL / Pro: 6.8 g/dL / ALK PHOS: 132 U/L / ALT: 144 U/L / AST: 65 U/L / GGT: x                                           Interval Diagnostic Studies: see sunrise for full report

## 2022-06-24 NOTE — PROGRESS NOTE ADULT - ASSESSMENT
66M w/ PMHx JEAN cirrhosis s/p OLT 7/2/20 (course c/b VRE bacteremia, CNI toxicity to both tacro and cyclosporine; switched to everolimus 7/27/20), HLD, IDDM, obesity, HFpEF presenting w/ fall and ureteral stone to [facility] on 6/15/22, now transferred to [facility] for further management.     Impression:   #Fall, rib fracture: now second admission for falls c/b rib fracture. Per neurologic evaluation possibly 2/2 spinal canal stenosis     #Ureteral stone c/b hydronephrosis, stable/improved  #Elevated liver enzymes: liver biopsy 12/30/21 without e/o rejection, found to have NRH  #JEAN cirrhosis s/p LT  #CMV viremia   #DM  #Anemia    Recommendations:  - appreciate neuro evaluation re: recurrent falls, stating likely 2/2 spinal canal stenosis / cervical myelopathy  (NSGY with no plans for surgical intervention) vs may need EMG evaluation as an outpatient  - immunosuppression: everolimus 4 mg BID (dose by level), and CellCept 250 mg BID, and prednisone 5mg daily  - remains off valcyte per ID   - will need outpatient neurology follow up   - appreciate dispo planning; recommended for acute rehab      Recommendations incomplete until finalized by attending signature/attestation to note.    Thank you for involving us in the care of this patient, please reach out if any further questions.     Ky Ram MD  Gastroenterology/Hepatology Fellow, PGY5    Available on [employer] Teams  [phone] ([facility])  [phone] ([facility])  Please contact on call fellow weekdays after 5pm-7am and weekends: [phone]

## 2022-06-24 NOTE — OCCUPATIONAL THERAPY INITIAL EVALUATION ADULT - BED MOBILITY TRAINING, PT EVAL
GOAL: Pt will be independent with all bed mobility tasks within 4 weeks to increase ability to engage in functional mobility tasks.

## 2022-06-24 NOTE — PROGRESS NOTE ADULT - ASSESSMENT
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    s/p OLT 7/2/20 (course c/b VRE bacteremia, L Foot OM, CNI toxicity to both Tacrolimus and Cyclosporine; switched to Everolimus 7/27/20).  Recent syncope and fall, COVID all in 12/2022.  Transferred s/p fall from [facility]    s/p fall   - CT neck with mod-severe spinal stenosis with associated pain. Neuro/NeuroSx teams consulted, appreciate input  - MRI neck non-con with mod canal stenosis  - MRI lumbar completed.    - Dental consult appreciated Rec; No signs of acute odontogenic infection. FU outpt for comprehensive dental care  - Neurosurgery: Can pursue rest of workup in outpatient setting. Can be referred to ortho spine and neurology (Dr Viera: [phone]) for EMG/NCS within 1-2 weeks after discharge. Given likely chronicity of pathology, low current clinical utility for steroids  - Continue PT/OT, pain management, fall precautions . Physiatry recommending Acute rehab. CM submitted request    L ureteral stone with hydronephrosis  -Appreciate  input  -continue PO/IV hydration and Flomax in hopes stone will pass  -Renal U/S with no acute stone, mild hydro   -mild MISA on arrival, Cr trending down/stable. Urine clear    s/p OLT  - LFTs persist slightly elevated- will continue to monitor  - Atorvastatin DCd 6/21  -SQH/SCDs  - Daily CBC, CMP, INR, Mag, Phos, Everolimus level    Immunosuppression  -continue Everolimus per level, MMF  250BID, Pred 5  -PPx: Valcyte (recent low level viremia, Transplant ID following), PPI    DM:  - glucose improving  - Increase Lantus to 14Units qpm. Continue Premeal admelog 10units  - Continue ISS  - Diabetic diet    Dispo:  - pending acceptance at Acute Rehab

## 2022-06-24 NOTE — PROGRESS NOTE ADULT - SUBJECTIVE AND OBJECTIVE BOX
no new complaints     REVIEW OF SYSTEMS  Constitutional - No fever,  No fatigue  HEENT - No vertigo, No neck pain  Neurological - No headaches, No memory loss, No loss of strength, No numbness, No tremors  Skin - No rashes, No lesions   Musculoskeletal - No joint pain, No joint swelling, No muscle pain  Psychiatric - No depression, No anxiety    FUNCTIONAL PROGRESS  6/23 PT  bed mobility min assist   transfers min assist with RW  gait min assist with RW x 10 feet  unsteady gait     OT 6/24  bed mobility min assist  transfers min assist with RW      VITALS  T(C): 37 (06-24-22 @ 04:57), Max: 37.1 (06-23-22 @ 13:00)  HR: 77 (06-24-22 @ 09:21) (72 - 83)  BP: 131/64 (06-24-22 @ 09:21) (131/64 - 164/84)  RR: 18 (06-24-22 @ 04:57) (18 - 18)  SpO2: 95% (06-24-22 @ 09:21) (94% - 98%)  Wt(kg): --    MEDICATIONS   artificial  tears Solution 1 Drop(s) four times a day PRN  dextrose 5%. 1000 milliLiter(s) <Continuous>  dextrose 5%. 1000 milliLiter(s) <Continuous>  dextrose 50% Injectable 25 Gram(s) once  dextrose 50% Injectable 12.5 Gram(s) once  dextrose 50% Injectable 25 Gram(s) once  dextrose Oral Gel 15 Gram(s) once PRN  everolimus (ZORTRESS) 4 milliGRAM(s) <User Schedule>  folic acid 1 milliGRAM(s) daily  glucagon  Injectable 1 milliGRAM(s) once  heparin   Injectable 5000 Unit(s) every 12 hours  insulin glargine Injectable (LANTUS) 14 Unit(s) at bedtime  insulin lispro (ADMELOG) corrective regimen sliding scale   three times a day before meals  insulin lispro (ADMELOG) corrective regimen sliding scale   at bedtime  insulin lispro Injectable (ADMELOG) 10 Unit(s) three times a day before meals  lidocaine   4% Patch 1 Patch daily  lidocaine   4% Patch 1 Patch daily  mycophenolate mofetil 250 milliGRAM(s) two times a day  omega-3-Acid Ethyl Esters 1 Gram(s) two times a day  pantoprazole    Tablet 40 milliGRAM(s) before breakfast  polyethylene glycol 3350 17 Gram(s) daily  predniSONE   Tablet 5 milliGRAM(s) daily  sertraline 100 milliGRAM(s) daily  tamsulosin 0.4 milliGRAM(s) at bedtime  traMADol 25 milliGRAM(s) every 12 hours PRN      RECENT LABS - Reviewed                        9.6    4.19  )-----------( 155      ( 24 Jun 2022 06:20 )             30.7     06-24    143  |  108  |  41<H>  ----------------------------<  182<H>  4.8   |  21<L>  |  1.98<H>    Ca    9.2      24 Jun 2022 06:20  Phos  3.5     06-24  Mg     2.0     06-24    TPro  6.8  /  Alb  3.4  /  TBili  0.2  /  DBili  x   /  AST  65<H>  /  ALT  144<H>  /  AlkPhos  132<H>  06-24      ------------------------------------------------------------------------  PHYSICAL EXAM  Constitutional - NAD, Comfortable, in bed   Chest - Breathing comfortably  Cardiovascular - S1S2   Abdomen - Soft   Extremities - No C/C/E, No calf tenderness   Neurologic Exam -                    Cognitive - Awake, Alert, AAO to self, place, date, year, situation     Communication - Fluent, No dysarthria        Motor - No focal deficits                    LEFT    UE - ShAB 5/5, EF 5/5, EE 5/5, WE 5/5,  5/5                    RIGHT UE - ShAB 5/5, EF 5/5, EE 5/5, WE 5/5,  5/5                    LEFT    LE - HF 5/5, KE 5/5, DF 5/5, PF 5/5                    RIGHT LE - HF 5/5, KE 5/5, DF 5/5, PF 5/5        Sensory - decreased to LT, b/l LE to calf      Psychiatric - Mood stable, Affect WNL  ----------------------------------------------------------------------------------------  ASSESSMENT/PLAN  66yMale h/o JEAN cirrhosis s/p OLT 7/2020, DM with functional deficits after fall at home with rib fracture  falls at home, MRI with stenosis, also decreased sensation on neuro exam, peripheral neuropathy  plan for EMG as outpatient  Pain - Tylenol, lidocaine patch  DVT PPX - SCDs, heparin SQ  Rehab -   continue bedside therapy, requires min assist with functional mobility   out of bed to chair daily   Will continue to follow for ongoing rehab needs and recommendations.    Recommend ACUTE inpatient rehabilitation for the functional deficits consisting of 3 hours of therapy/day & 24 hour RN/daily PMR physician for comorbid medical management. Patient will be able to tolerate 3 hours a day.

## 2022-06-24 NOTE — OCCUPATIONAL THERAPY INITIAL EVALUATION ADULT - LIVES WITH, PROFILE
pt lives in a private house with 3 children. pt has ramp to enter front door and no stairs inside. pt owns 3-1 commode, wheelchair, hospital bed, cane, shower chair, and rolling walker. PTA pt was independent in all ADLs and functional tasks with use of AE/children

## 2022-06-24 NOTE — OCCUPATIONAL THERAPY INITIAL EVALUATION ADULT - ADL RETRAINING, OT EVAL
GOAL: pt will be independent with all UB/LB dressing tasks within 4 weeks. GOAL: Pt will be independent in all toileting tasks within 4 weeks. GOAL: Pt will be independent with all bathing tasks within 4 weeks.

## 2022-06-24 NOTE — OCCUPATIONAL THERAPY INITIAL EVALUATION ADULT - ADDITIONAL COMMENTS
cont' from current problem- Of note he was admitted from 12/20/21 to 1/6/22 s/p fall at home, no LOC or injuries. Found to be +Covid w/ CT chest findings concerning for beginnings of multifocal PNA. Received remdesivir and mononclonal antibodies and on dexamethasone (12/29-1/7). 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 Patient was found to have DVT on L femoral vein and started on full treatment AC, now on Eliquis 5mg BID. MRI Spine 6/17- Multilevel cervical spondylosis with variable degrees of canal and foraminal narrowing, as outlined. Findings are worst at the C6-C7 level.

## 2022-06-24 NOTE — OCCUPATIONAL THERAPY INITIAL EVALUATION ADULT - DIAGNOSIS, OT EVAL
pt presents with decreased strength, balance, endurance, postural control, sensation, and ROM limiting their ability to engage in ADLs and functional tasks.

## 2022-06-24 NOTE — PROGRESS NOTE ADULT - ASSESSMENT
anemia   Hx GIB  decompensated JEAN/Cirrhosis s/p liver transplant   ureteral calculus    diarrhea resolved  no signs of GI bleeding  CBC daily  diet as tolerated  lfts improving   ct noted   urology following   care per transplant services appreciated   dc planing as per primary   dw pt

## 2022-06-24 NOTE — PROGRESS NOTE ADULT - SUBJECTIVE AND OBJECTIVE BOX
[facility]  Ramón Falk PA-C  [facility] [place] [address]  [phone]      INTERVAL HPI/OVERNIGHT EVENTS:  pt seen and examined, no new events  tolerating diet, +BM    MEDICATIONS  (STANDING):  atorvastatin 80 milliGRAM(s) Oral at bedtime  dextrose 5%. 1000 milliLiter(s) (100 mL/Hr) IV Continuous <Continuous>  dextrose 5%. 1000 milliLiter(s) (50 mL/Hr) IV Continuous <Continuous>  dextrose 50% Injectable 25 Gram(s) IV Push once  dextrose 50% Injectable 12.5 Gram(s) IV Push once  dextrose 50% Injectable 25 Gram(s) IV Push once  everolimus (ZORTRESS) 4 milliGRAM(s) Oral <User Schedule>  folic acid 1 milliGRAM(s) Oral daily  glucagon  Injectable 1 milliGRAM(s) IntraMuscular once  heparin   Injectable 5000 Unit(s) SubCutaneous every 12 hours  insulin glargine Injectable (LANTUS) 8 Unit(s) SubCutaneous at bedtime  insulin lispro (ADMELOG) corrective regimen sliding scale   SubCutaneous three times a day before meals  insulin lispro (ADMELOG) corrective regimen sliding scale   SubCutaneous at bedtime  insulin lispro Injectable (ADMELOG) 6 Unit(s) SubCutaneous three times a day before meals  lidocaine   4% Patch 1 Patch Transdermal daily  lidocaine   4% Patch 1 Patch Transdermal daily  mycophenolate mofetil 250 milliGRAM(s) Oral two times a day  omega-3-Acid Ethyl Esters 1 Gram(s) Oral two times a day  pantoprazole    Tablet 40 milliGRAM(s) Oral before breakfast  polyethylene glycol 3350 17 Gram(s) Oral once  predniSONE   Tablet 5 milliGRAM(s) Oral daily  sertraline 100 milliGRAM(s) Oral daily  sodium chloride 0.9%. 1000 milliLiter(s) (50 mL/Hr) IV Continuous <Continuous>  tamsulosin 0.4 milliGRAM(s) Oral at bedtime    MEDICATIONS  (PRN):  dextrose Oral Gel 15 Gram(s) Oral once PRN Blood Glucose LESS THAN 70 milliGRAM(s)/deciliter  traMADol 25 milliGRAM(s) Oral every 12 hours PRN Severe Pain (7 - 10)      Allergies    codeine (Anaphylaxis)    Intolerances        ROS:   General:  No wt loss, fevers, chills, night sweats, fatigue,   Eyes:  Good vision, no reported pain  ENT:  No sore throat, pain, runny nose, dysphagia  CV:  No pain, palpitations, hypo/hypertension  Resp:  No dyspnea, cough, tachypnea, wheezing  GI:  No pain, No nausea, No vomiting, No diarrhea, No constipation, No weight loss, No fever, No pruritis, No rectal bleeding, No tarry stools, No dysphagia,  :  No pain, bleeding, incontinence, nocturia  Muscle:  No pain, weakness  Neuro:  No weakness, tingling, memory problems  Psych:  No fatigue, insomnia, mood problems, depression  Endocrine:  No polyuria, polydipsia, cold/heat intolerance  Heme:  No petechiae, ecchymosis, easy bruisability  Skin:  No rash, tattoos, scars, edema      PHYSICAL EXAM:   Vital Signs Last 24 Hrs  T(C): 37 (2022 04:57), Max: 37.1 (2022 13:00)  T(F): 98.6 (2022 04:57), Max: 98.8 (2022 17:00)  HR: 77 (2022 09:21) (72 - 83)  BP: 131/64 (2022 09:21) (131/64 - 164/84)  BP(mean): --  RR: 18 (2022 04:57) (18 - 18)  SpO2: 95% (2022 09:21) (94% - 98%)  Daily     Daily Weight in k.5 (2022 21:06)    GENERAL:  Appears stated age,   HEENT:  NC/AT,    CHEST:  Full & symmetric excursion,   HEART:  Regular rhythm,  ABDOMEN:  Soft, non-tender, non-distended,  EXTEREMITIES:  no cyanosis  SKIN:  No rash  NEURO:  Alert,       LABS:                        9.6    4.19  )-----------( 155      ( 2022 06:20 )             30.7   06-24    143  |  108  |  41<H>  ----------------------------<  182<H>  4.8   |  21<L>  |  1.98<H>    Ca    9.2      2022 06:20  Phos  3.5       Mg     2.0         TPro  6.8  /  Alb  3.4  /  TBili  0.2  /  DBili  x   /  AST  65<H>  /  ALT  144<H>  /  AlkPhos  132<H>              RADIOLOGY & ADDITIONAL TESTS:

## 2022-06-24 NOTE — OCCUPATIONAL THERAPY INITIAL EVALUATION ADULT - GROOMING, PREVIOUS LEVEL OF FUNCTION, OT EVAL
Daniel Sauer MD  [facility]    [facility]   [address] [place], [place] [id number] [phone]      [facility]   [id number]  [place] [address] [phone]     Knee Arthroscopy Postoperative Discharge Instructions    ACTIVITY  [x]  You should not bear weight on the operative extremity, you should use crutches and you may put your toes down on the ground for balance.  [x]  Active motion and strengthening of the quadriceps muscles should start the day after surgery as illustrated on the postoperative knee exercise sheet. You have no restrictions on motion of your knee.    WOUND CARE/BANDAGES  [x]  A feeling of “splashing” fluid in the joint is not a cause for concern.  It is residual fluid from surgery and will be absorbed.  [x]  The small incisions may be sore and develop bruising over the next several days. This bruising will eventually disappear and does not require special care.  [x]  A soft compression dressing has been applied to your knee.  This compression dressing should be comfortable and absorb any leakage of fluid and/or blood.  [x]  Although dressing may become moist or blood stained, this is not usually cause for alarm.  [x]  Change dressing the day after surgery, cover incisions with Band-Aids.  [x]  Keep an elastic wrap or cold cuff on the knee for the first 7-10 days to control swelling.    COLD THERAPY  [x]  Use ice packs as instructed at the hospital during the first 7-10 days after surgery.    BATHING  [x]  Keep your incisions dry for the first 48 hours.  [x]  After 48 hours you may shower.  [x]  NO bathing or complete soaking of the incision until seen at the first postoperative clinic appointment.    MEDICATIONS  [x]  Please remind us of any drug allergies.  [x]  You will be given a prescription for pain medication at the time of discharge to be taken as directed.  Often the prescription will contain  Hydrocodone and can cause drowsiness, nausea or a fine rash.  If these problems develop, contact our office for a different medication.  [x]  You should not drive an automobile or operate heavy machinery while on pain medication.  [x]  Senokot S 50/8.6 mg daily for constipation while taking narcotic pain medications.  May need to supplement this with Milk of Magnesia if constipation persists.  [x] Anti-inflammatories can be quite helpful after knee surgery for pain. You may take OTC ibuprofen 600mg every 6 hours as needed or Aleve (Naproxen) 440mg tabs (2 tabs) every 12 hours as needed. Take with food to avoid stomach update. Do not take if you are on prescription blood thinners or other anti-inflammatories (such as Meloxicam).     BLOOD CLOT PREVENTION  [x]  If over 40 years of age, take Aspirin 325 mg daily for 4 weeks unless you are on other blood thinners.    FOLLOW UP  [x]  Call [phone] the first weekday after your surgery to schedule a postoperative clinic appointment 10-14 days after surgery.    When to Call Your Doctor  Call your doctor right away at [phone] or [phone] if off hours or on weekends (ask for the Orthopedic Surgeon on call) if you have any of these problems after surgery:   · Increased knee pain  · Swelling/redness  · Yellowish drainage  · Fever over 101.0°F or 38.3°C  · Pain not controlled by medication is also a cause for concern             1. Quadriceps Sets  · Sit or lie down on your back with your legs straight out.  Bend ankle and toes toward your head.     · Tighten the thigh pressing the back of your knee down to the floor.         Hold for 5 seconds each.  · Perform at least 50 repetitions per day.           2. Straight Leg Raises  · Lying on your back, bend your uninvolved knee and put your foot on the floor.    · With the involved leg, bend ankle toward your head as you tighten your thigh muscle, performing a Quadriceps Set (#1 above).    · Keeping the leg  straight, lift your leg 12-18 inches off the floor.  Hold for 3 seconds, then lower leg.  · Perform at least 50 repetitions per day        3. Terminal Knee Extensions  · Lie on your back with a towel roll under your knee.   · Tighten your thigh muscle and slowly raise your foot off the floor, straightening your knee.  Hold for 3 seconds, then lower leg.  · Perform at least 50 repetitions per day.       4. Heel Slides  · Lie on your back.  Slide your heel up toward the buttocks as far as you can.  · As you reach the end range, use a towel behind the thigh to assist in        further bending.  Hold for 5 seconds.   · Perform at 10 repetitions, 4-6 times per day.     Care After Anesthesia or Sedation    After Discharge  · Due to the medicine given, someone must drive you home.  It is strongly recommended to have someone stay with you at home the day of discharge and the night after surgery.  · If you have infants or small children at home, please have someone help you for at least 24 hours after your surgery.  · Do not drive for at least 24 hours after surgery (or as told by your doctor).  · Rest for the remainder of the day. Go up and down stairs slowly.  · Do not smoke after surgery.  Smoking can delay healing.  · Do not operate heavy or potential harmful equipment.  · Do not make legally binding decisions.  · Do no drink alcohol for 24 hours.    Diet  · Nausea may be expected for the first 24 to 48 hours.  Start eating a bland diet (toast, gelatin, 7-up, hot cereal, crackers, sherbet, broth, soup).  · Drink plenty of fluids (6 to 8 glasses of water).    · Resume your regular diet as able.  Food intake is important.    · Avoid greasy or spicy foods for 24 hours.    Urination  · The effects of anesthetics may cause some people to have trouble passing urine the day of surgery.  Drink a lot of fluids to help prevent this.  · Try to urinate within 8 hours of surgery.  · If you are unable to pass urine and feel like you  need to, call your doctor or the hospital.    Pain Control  · If your incision was injected with a long acting local anesthetic, it will wear off in 4 to 6 hours.  You can expect to have some pain at this time.  · Treat your pain with the prescribed pain medicine before it wears off.  Do not wait until your pain becomes severe.    · Ask your nurse when you had your last pain medicine, so you know when you can take another one after you get home.    · Your last pain medicine was given at _________.  · Your last dose of Ibuprofen was given at 15:28PM.      Preventing pneumonia  Pneumonia is a serious lung infection that can occur after surgery.   · Wash your hands often, especially before and after eating and going to the bathroom.  · Keep your mouth clean.  Brush your teeth and tongue twice a day.  Bacteria in your mouth can get into your lungs.  · Take deep breaths and cough to keep your lungs clear.  · Sit up in the chair for meals to prevent choking.  · Progress to getting out of bed and walking to expand your lungs an maintain your strength.       Call your doctor if you have:    · Nausea and vomiting that does not stop  · Fever over 101 degrees F.  · Pain not relieved by pain medication  · If you feel you have to pass urine and you are not able to do so.  · Unusual changes in behavior  · Dizziness  · Hives  · Severe cough with green or yellow mucous     If you are not able to reach your doctor, you may call the emergency department.         independent

## 2022-06-25 LAB
ALBUMIN SERPL ELPH-MCNC: 3.2 G/DL — LOW (ref 3.3–5)
ALP SERPL-CCNC: 136 U/L — HIGH (ref 40–120)
ALT FLD-CCNC: 128 U/L — HIGH (ref 10–45)
ANION GAP SERPL CALC-SCNC: 12 MMOL/L — SIGNIFICANT CHANGE UP (ref 5–17)
AST SERPL-CCNC: 50 U/L — HIGH (ref 10–40)
BILIRUB SERPL-MCNC: 0.2 MG/DL — SIGNIFICANT CHANGE UP (ref 0.2–1.2)
BUN SERPL-MCNC: 45 MG/DL — HIGH (ref 7–23)
CALCIUM SERPL-MCNC: 9 MG/DL — SIGNIFICANT CHANGE UP (ref 8.4–10.5)
CHLORIDE SERPL-SCNC: 106 MMOL/L — SIGNIFICANT CHANGE UP (ref 96–108)
CO2 SERPL-SCNC: 21 MMOL/L — LOW (ref 22–31)
CREAT SERPL-MCNC: 1.95 MG/DL — HIGH (ref 0.5–1.3)
EGFR: 37 ML/MIN/1.73M2 — LOW
GLUCOSE BLDC GLUCOMTR-MCNC: 122 MG/DL — HIGH (ref 70–99)
GLUCOSE BLDC GLUCOMTR-MCNC: 138 MG/DL — HIGH (ref 70–99)
GLUCOSE BLDC GLUCOMTR-MCNC: 227 MG/DL — HIGH (ref 70–99)
GLUCOSE BLDC GLUCOMTR-MCNC: 228 MG/DL — HIGH (ref 70–99)
GLUCOSE SERPL-MCNC: 207 MG/DL — HIGH (ref 70–99)
HCT VFR BLD CALC: 30.3 % — LOW (ref 39–50)
HGB BLD-MCNC: 9.5 G/DL — LOW (ref 13–17)
MAGNESIUM SERPL-MCNC: 2.1 MG/DL — SIGNIFICANT CHANGE UP (ref 1.6–2.6)
MCHC RBC-ENTMCNC: 29.1 PG — SIGNIFICANT CHANGE UP (ref 27–34)
MCHC RBC-ENTMCNC: 31.4 GM/DL — LOW (ref 32–36)
MCV RBC AUTO: 92.9 FL — SIGNIFICANT CHANGE UP (ref 80–100)
NRBC # BLD: 0 /100 WBCS — SIGNIFICANT CHANGE UP (ref 0–0)
PHOSPHATE SERPL-MCNC: 3.6 MG/DL — SIGNIFICANT CHANGE UP (ref 2.5–4.5)
PLATELET # BLD AUTO: 166 K/UL — SIGNIFICANT CHANGE UP (ref 150–400)
POTASSIUM SERPL-MCNC: 4.5 MMOL/L — SIGNIFICANT CHANGE UP (ref 3.5–5.3)
POTASSIUM SERPL-SCNC: 4.5 MMOL/L — SIGNIFICANT CHANGE UP (ref 3.5–5.3)
PROT SERPL-MCNC: 6.9 G/DL — SIGNIFICANT CHANGE UP (ref 6–8.3)
RBC # BLD: 3.26 M/UL — LOW (ref 4.2–5.8)
RBC # FLD: 13.1 % — SIGNIFICANT CHANGE UP (ref 10.3–14.5)
SODIUM SERPL-SCNC: 139 MMOL/L — SIGNIFICANT CHANGE UP (ref 135–145)
WBC # BLD: 3.42 K/UL — LOW (ref 3.8–10.5)
WBC # FLD AUTO: 3.42 K/UL — LOW (ref 3.8–10.5)

## 2022-06-25 PROCEDURE — 99232 SBSQ HOSP IP/OBS MODERATE 35: CPT | Mod: GC

## 2022-06-25 RX ORDER — TRAMADOL HYDROCHLORIDE 50 MG/1
25 TABLET ORAL EVERY 12 HOURS
Refills: 0 | Status: DISCONTINUED | OUTPATIENT
Start: 2022-06-25 | End: 2022-06-29

## 2022-06-25 RX ADMIN — MYCOPHENOLATE MOFETIL 250 MILLIGRAM(S): 250 CAPSULE ORAL at 06:16

## 2022-06-25 RX ADMIN — LIDOCAINE 1 PATCH: 4 CREAM TOPICAL at 13:39

## 2022-06-25 RX ADMIN — EVEROLIMUS 4 MILLIGRAM(S): 10 TABLET ORAL at 09:15

## 2022-06-25 RX ADMIN — INSULIN GLARGINE 14 UNIT(S): 100 INJECTION, SOLUTION SUBCUTANEOUS at 21:28

## 2022-06-25 RX ADMIN — LIDOCAINE 1 PATCH: 4 CREAM TOPICAL at 19:00

## 2022-06-25 RX ADMIN — SERTRALINE 100 MILLIGRAM(S): 25 TABLET, FILM COATED ORAL at 13:38

## 2022-06-25 RX ADMIN — Medication 4: at 13:37

## 2022-06-25 RX ADMIN — TAMSULOSIN HYDROCHLORIDE 0.4 MILLIGRAM(S): 0.4 CAPSULE ORAL at 20:47

## 2022-06-25 RX ADMIN — Medication 10 UNIT(S): at 17:48

## 2022-06-25 RX ADMIN — Medication 4: at 09:15

## 2022-06-25 RX ADMIN — LIDOCAINE 1 PATCH: 4 CREAM TOPICAL at 00:00

## 2022-06-25 RX ADMIN — Medication 1 MILLIGRAM(S): at 13:38

## 2022-06-25 RX ADMIN — HEPARIN SODIUM 5000 UNIT(S): 5000 INJECTION INTRAVENOUS; SUBCUTANEOUS at 17:49

## 2022-06-25 RX ADMIN — Medication 5 MILLIGRAM(S): at 06:03

## 2022-06-25 RX ADMIN — MYCOPHENOLATE MOFETIL 250 MILLIGRAM(S): 250 CAPSULE ORAL at 17:49

## 2022-06-25 RX ADMIN — EVEROLIMUS 4 MILLIGRAM(S): 10 TABLET ORAL at 20:48

## 2022-06-25 RX ADMIN — TRAMADOL HYDROCHLORIDE 25 MILLIGRAM(S): 50 TABLET ORAL at 06:47

## 2022-06-25 RX ADMIN — PANTOPRAZOLE SODIUM 40 MILLIGRAM(S): 20 TABLET, DELAYED RELEASE ORAL at 06:03

## 2022-06-25 RX ADMIN — Medication 1 GRAM(S): at 17:49

## 2022-06-25 RX ADMIN — Medication 10 UNIT(S): at 13:37

## 2022-06-25 RX ADMIN — Medication 1 GRAM(S): at 06:03

## 2022-06-25 RX ADMIN — Medication 10 UNIT(S): at 09:15

## 2022-06-25 RX ADMIN — HEPARIN SODIUM 5000 UNIT(S): 5000 INJECTION INTRAVENOUS; SUBCUTANEOUS at 06:03

## 2022-06-25 NOTE — PROGRESS NOTE ADULT - ASSESSMENT
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    s/p OLT 7/2/20 (course c/b VRE bacteremia, L Foot OM, CNI toxicity to both Tacrolimus and Cyclosporine; switched to Everolimus 7/27/20).  Recent syncope and fall, COVID all in 12/2022.  Transferred s/p fall from [place]    s/p fall   - CT neck with mod-severe spinal stenosis with associated pain. Neuro/NeuroSx teams consulted, appreciate input  - MRI neck non-con with mod canal stenosis  - MRI lumbar completed.    - Dental consult appreciated Rec; No signs of acute odontogenic infection. FU outpt for comprehensive dental care  - Neurosurgery: Can pursue rest of workup in outpatient setting. Can be referred to ortho spine and neurology (Dr Virea: [address] [phone]) for EMG/NCS within 1-2 weeks after discharge. Given likely chronicity of pathology, low current clinical utility for steroids  - Continue PT/OT, pain management, fall precautions . Physiatry recommending Acute rehab. Declined by Jerry Cordero CM working on finding new facility     L ureteral stone with hydronephrosis  -Appreciate  input  -continue PO/IV hydration and Flomax in hopes stone will pass  -Renal U/S with no acute stone, mild hydro   -mild MISA on arrival, Cr trending down/stable. Urine clear    s/p OLT  - LFTs were uptrending, now down.   - Atorvastatin DCd 6/21  -SQH/SCDs  - Daily CBC, CMP, INR, Mag, Phos, Everolimus level    Immunosuppression  -continue Everolimus per level, MMF  250BID, Pred 5  -PPx: Valcyte (recent low level viremia, Transplant ID following), PPI    DM:  - glucose improving  - Continue Lantus to 14Units qpm. Continue Premeal admelog 10units  - Continue ISS  - Diabetic diet    Dispo:  - pending acceptance at Acute Rehab

## 2022-06-25 NOTE — PROGRESS NOTE ADULT - ASSESSMENT
·	anemia   ·	Hx GIB  ·	decompensated JEAN/Cirrhosis s/p liver transplant   ·	ureteral calculus    diarrhea resolved  no signs of GI bleeding  CBC daily  diet as tolerated  lfts improving   ct noted   urology following   care per transplant services appreciated   dc planing as per primary   dw pt

## 2022-06-25 NOTE — PROGRESS NOTE ADULT - SUBJECTIVE AND OBJECTIVE BOX
Interval Events:   NAEON, afebrile HD stable  Doing well, denies complaints  Recommended for acute rehab      Hospital Medications:  artificial  tears Solution 1 Drop(s) Both EYES four times a day PRN  dextrose 5%. 1000 milliLiter(s) IV Continuous <Continuous>  dextrose 5%. 1000 milliLiter(s) IV Continuous <Continuous>  dextrose 50% Injectable 25 Gram(s) IV Push once  dextrose 50% Injectable 12.5 Gram(s) IV Push once  dextrose 50% Injectable 25 Gram(s) IV Push once  dextrose Oral Gel 15 Gram(s) Oral once PRN  everolimus (ZORTRESS) 4 milliGRAM(s) Oral <User Schedule>  folic acid 1 milliGRAM(s) Oral daily  glucagon  Injectable 1 milliGRAM(s) IntraMuscular once  heparin   Injectable 5000 Unit(s) SubCutaneous every 12 hours  insulin glargine Injectable (LANTUS) 14 Unit(s) SubCutaneous at bedtime  insulin lispro (ADMELOG) corrective regimen sliding scale   SubCutaneous three times a day before meals  insulin lispro (ADMELOG) corrective regimen sliding scale   SubCutaneous at bedtime  insulin lispro Injectable (ADMELOG) 10 Unit(s) SubCutaneous three times a day before meals  lidocaine   4% Patch 1 Patch Transdermal daily  lidocaine   4% Patch 1 Patch Transdermal daily  mycophenolate mofetil 250 milliGRAM(s) Oral two times a day  omega-3-Acid Ethyl Esters 1 Gram(s) Oral two times a day  pantoprazole    Tablet 40 milliGRAM(s) Oral before breakfast  polyethylene glycol 3350 17 Gram(s) Oral daily  sertraline 100 milliGRAM(s) Oral daily  tamsulosin 0.4 milliGRAM(s) Oral at bedtime  traMADol 25 milliGRAM(s) Oral every 12 hours PRN      ROS: All system reviewed and negative except as mentioned above.    PHYSICAL EXAM:   Vital Signs:  Vital Signs Last 24 Hrs  T(C): 36.9 (24 Jun 2022 21:00), Max: 36.9 (24 Jun 2022 21:00)  T(F): 98.4 (24 Jun 2022 21:00), Max: 98.4 (24 Jun 2022 21:00)  HR: 77 (24 Jun 2022 21:00) (76 - 77)  BP: 139/67 (24 Jun 2022 21:00) (136/75 - 139/67)  BP(mean): --  RR: 18 (24 Jun 2022 21:00) (18 - 18)  SpO2: 94% (24 Jun 2022 21:00) (94% - 96%)  Daily     Daily     GENERAL:  NAD, Appears stated age  HEENT:  NC/AT,  conjunctivae clear and pink, sclera -anicteric  CHEST:  Normal Effort, Breath sounds clear  HEART:  RRR, S1 + S2, no murmurs  ABDOMEN:  Soft, non-tender, non-distended, normoactive bowel sounds,  no masses  EXTREMITIES:  no cyanosis or edema  SKIN:  Warm & Dry. No rash or erythema  NEURO:  Alert, oriented, no focal deficit    LABS:                        9.5    3.42  )-----------( 166      ( 25 Jun 2022 07:14 )             30.3     Mean Cell Volume: 92.9 fl (06-25-22 @ 07:14)    06-25    139  |  106  |  45<H>  ----------------------------<  207<H>  4.5   |  21<L>  |  1.95<H>    Ca    9.0      25 Jun 2022 07:07  Phos  3.6     06-25  Mg     2.1     06-25    TPro  6.9  /  Alb  3.2<L>  /  TBili  0.2  /  DBili  x   /  AST  50<H>  /  ALT  128<H>  /  AlkPhos  136<H>  06-25    LIVER FUNCTIONS - ( 25 Jun 2022 07:07 )  Alb: 3.2 g/dL / Pro: 6.9 g/dL / ALK PHOS: 136 U/L / ALT: 128 U/L / AST: 50 U/L / GGT: x                                       9.5    3.42  )-----------( 166      ( 25 Jun 2022 07:14 )             30.3                         9.6    4.19  )-----------( 155      ( 24 Jun 2022 06:20 )             30.7                         9.3    3.63  )-----------( 149      ( 23 Jun 2022 05:59 )             29.3       Imaging: Images reviewed.

## 2022-06-25 NOTE — PROGRESS NOTE ADULT - SUBJECTIVE AND OBJECTIVE BOX
Transplant Surgery - Multidisciplinary Rounds  --------------------------------------------------------------  OLT 7/2/2020    Present: Patient seen and examined with multidisciplinary Transplant team including  Surgeon: Dr. Gray. Hepatologist: Dr. Lee, [facility] and unit RNs during AM rounds.   Disciplines not in attendance will be notified of the plan.     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    s/p OLT 7/2/20 (course c/b VRE bacteremia, L foot OM, CNI toxicity to both Tacrolimus and Cyclosporine; switched to Everolimus 7/27/20    Admitted from 12/20/21 to 1/6/22 s/p fall at home, no LOC or injuries.  - Found to be +Covid w/ CT chest findings concerning for beginnings of multifocal PNA. Received remdesivir and mononclonal antibodies and on dexamethasone (12/29-1/7).   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 PCP prophylaxis changed from bactrim to atovaquone for LFTs.   -Patient was found to have DVT on L femoral vein and started on full treatment AC, now on Eliquis 5mg BID.     ------------  Transferred to [facility] from [facility] on 6/16/2022 where he was admitted for multiple falls over 24hr period.  +HA, +neck pain, +L rib pain.  Imaging revealed:  -C5-C7 mod-severe spinal stenosis  -L rib fx  -5mm distal L ureteral stone  -also w/ diarrhea    Interval Events:  - Afebrile, VSS  -Continues to be weak but reports some improvement.  -Was declined by [facility] yesterday, CM applying to other facilities.   -LFTs slightly uptrended yesterday, now downtrending today.   -Neuro recommending outpatient follow-up  Will need EMG/NCS    Immunosuppression:  Everolimus 4mg BID, MMF 250mg bid (due to GI symptoms), pred 5mg QD  Potential Discharge date: pending acceptance at acute rehab  Education:  Medications  Plan of care:  See Below      MEDICATIONS  (STANDING):  dextrose 5%. 1000 milliLiter(s) (50 mL/Hr) IV Continuous <Continuous>  dextrose 5%. 1000 milliLiter(s) (100 mL/Hr) IV Continuous <Continuous>  dextrose 50% Injectable 25 Gram(s) IV Push once  dextrose 50% Injectable 12.5 Gram(s) IV Push once  dextrose 50% Injectable 25 Gram(s) IV Push once  everolimus (ZORTRESS) 4 milliGRAM(s) Oral <User Schedule>  folic acid 1 milliGRAM(s) Oral daily  glucagon  Injectable 1 milliGRAM(s) IntraMuscular once  heparin   Injectable 5000 Unit(s) SubCutaneous every 12 hours  insulin glargine Injectable (LANTUS) 14 Unit(s) SubCutaneous at bedtime  insulin lispro (ADMELOG) corrective regimen sliding scale   SubCutaneous three times a day before meals  insulin lispro (ADMELOG) corrective regimen sliding scale   SubCutaneous at bedtime  insulin lispro Injectable (ADMELOG) 10 Unit(s) SubCutaneous three times a day before meals  lidocaine   4% Patch 1 Patch Transdermal daily  lidocaine   4% Patch 1 Patch Transdermal daily  mycophenolate mofetil 250 milliGRAM(s) Oral two times a day  omega-3-Acid Ethyl Esters 1 Gram(s) Oral two times a day  pantoprazole    Tablet 40 milliGRAM(s) Oral before breakfast  polyethylene glycol 3350 17 Gram(s) Oral daily  predniSONE   Tablet 5 milliGRAM(s) Oral daily  sertraline 100 milliGRAM(s) Oral daily  tamsulosin 0.4 milliGRAM(s) Oral at bedtime    MEDICATIONS  (PRN):  artificial  tears Solution 1 Drop(s) Both EYES four times a day PRN Dry Eyes  dextrose Oral Gel 15 Gram(s) Oral once PRN Blood Glucose LESS THAN 70 milliGRAM(s)/deciliter  traMADol 25 milliGRAM(s) Oral every 12 hours PRN Severe Pain (7 - 10)      PAST MEDICAL & SURGICAL HISTORY:  Diabetes      Hypercholesteremia      Neuropathy      Hypertension      Renal stones  25 years ago      Fatty liver disease, nonalcoholic      Obesity      Hepatic encephalopathy      GERD with esophagitis  Gastritis &amp; Non Bleeding Ulcers      GIB (gastrointestinal bleeding)      S/P cholecystectomy          Vital Signs Last 24 Hrs  T(C): 36.9 (24 Jun 2022 21:00), Max: 36.9 (24 Jun 2022 21:00)  T(F): 98.4 (24 Jun 2022 21:00), Max: 98.4 (24 Jun 2022 21:00)  HR: 77 (24 Jun 2022 21:00) (76 - 77)  BP: 139/67 (24 Jun 2022 21:00) (131/64 - 139/67)  BP(mean): --  RR: 18 (24 Jun 2022 21:00) (18 - 18)  SpO2: 94% (24 Jun 2022 21:00) (94% - 96%)    I&O's Summary    24 Jun 2022 07:01  -  25 Jun 2022 07:00  --------------------------------------------------------  IN: 360 mL / OUT: 600 mL / NET: -240 mL                              9.5    3.42  )-----------( 166      ( 25 Jun 2022 07:14 )             30.3     06-25    139  |  106  |  45<H>  ----------------------------<  207<H>  4.5   |  21<L>  |  1.95<H>    Ca    9.0      25 Jun 2022 07:07  Phos  3.6     06-25  Mg     2.1     06-25    TPro  6.9  /  Alb  3.2<L>  /  TBili  0.2  /  DBili  x   /  AST  50<H>  /  ALT  128<H>  /  AlkPhos  136<H>  06-25         Review of systems  Gen: No weight changes, fatigue, fevers/chills, see hpi  Skin: No rashes  Head/Eyes/Ears/Mouth: No headache; Normal hearing; Normal vision w/o blurriness; No sinus pain/discomfort, sore throat  Respiratory: No dyspnea, cough, wheezing, hemoptysis  CV: No chest pain, PND, orthopnea  GI:  no diarrhea, constipation, nausea, vomiting, melena, hematochezia  : No increased frequency, dysuria, hematuria, nocturia  MSK: see hpi, no edema  Neuro: No dizziness/lightheadedness, weakness, seizures, numbness, tingling  Heme: No easy bruising or bleeding  Endo: No heat/cold intolerance  Psych: No significant nervousness, anxiety, stress, depression  All other systems were reviewed and are negative, except as noted.      PHYSICAL EXAM:  Constitutional: Well developed / well nourished  Eyes: anicteric, PERRLA  ENMT: nc/at, no thrush, loose front tooth  Neck: supple  Respiratory: CTA B/L  Cardiovascular: RRR.  Gastrointestinal: Soft abdomen, ND/NT. well healed incisional scar  Genitourinary: Voiding spontaneously  Extremities: SCD's in place and working bilaterally, no calf TTP. no edema. L rib pain to palpation  Vascular: Palpable dp pulses bilaterally.   Neurological: A&O x3  Skin: no rashes, ulcerations, lesions  Musculoskeletal: Moving all extremities, decreased strength throughout  Psychiatric: Responsive

## 2022-06-25 NOTE — PROGRESS NOTE ADULT - SUBJECTIVE AND OBJECTIVE BOX
INTERVAL HPI/OVERNIGHT EVENTS:  Events noted     MEDICATIONS  (STANDING):  atorvastatin 80 milliGRAM(s) Oral at bedtime  dextrose 5%. 1000 milliLiter(s) (100 mL/Hr) IV Continuous <Continuous>  dextrose 5%. 1000 milliLiter(s) (50 mL/Hr) IV Continuous <Continuous>  dextrose 50% Injectable 25 Gram(s) IV Push once  dextrose 50% Injectable 12.5 Gram(s) IV Push once  dextrose 50% Injectable 25 Gram(s) IV Push once  everolimus (ZORTRESS) 4 milliGRAM(s) Oral <User Schedule>  folic acid 1 milliGRAM(s) Oral daily  glucagon  Injectable 1 milliGRAM(s) IntraMuscular once  heparin   Injectable 5000 Unit(s) SubCutaneous every 12 hours  insulin glargine Injectable (LANTUS) 8 Unit(s) SubCutaneous at bedtime  insulin lispro (ADMELOG) corrective regimen sliding scale   SubCutaneous three times a day before meals  insulin lispro (ADMELOG) corrective regimen sliding scale   SubCutaneous at bedtime  insulin lispro Injectable (ADMELOG) 6 Unit(s) SubCutaneous three times a day before meals  lidocaine   4% Patch 1 Patch Transdermal daily  lidocaine   4% Patch 1 Patch Transdermal daily  mycophenolate mofetil 250 milliGRAM(s) Oral two times a day  omega-3-Acid Ethyl Esters 1 Gram(s) Oral two times a day  pantoprazole    Tablet 40 milliGRAM(s) Oral before breakfast  polyethylene glycol 3350 17 Gram(s) Oral once  predniSONE   Tablet 5 milliGRAM(s) Oral daily  sertraline 100 milliGRAM(s) Oral daily  sodium chloride 0.9%. 1000 milliLiter(s) (50 mL/Hr) IV Continuous <Continuous>  tamsulosin 0.4 milliGRAM(s) Oral at bedtime    MEDICATIONS  (PRN):  dextrose Oral Gel 15 Gram(s) Oral once PRN Blood Glucose LESS THAN 70 milliGRAM(s)/deciliter  traMADol 25 milliGRAM(s) Oral every 12 hours PRN Severe Pain (7 - 10)      Allergies    codeine (Anaphylaxis)    Intolerances        ROS:   General:  No wt loss, fevers, chills, night sweats, fatigue,   Eyes:  Good vision, no reported pain  ENT:  No sore throat, pain, runny nose, dysphagia  CV:  No pain, palpitations, hypo/hypertension  Resp:  No dyspnea, cough, tachypnea, wheezing  GI:  No pain, No nausea, No vomiting, No diarrhea, No constipation, No weight loss, No fever, No pruritis, No rectal bleeding, No tarry stools, No dysphagia,  :  No pain, bleeding, incontinence, nocturia  Muscle:  No pain, weakness  Neuro:  No weakness, tingling, memory problems  Psych:  No fatigue, insomnia, mood problems, depression  Endocrine:  No polyuria, polydipsia, cold/heat intolerance  Heme:  No petechiae, ecchymosis, easy bruisability  Skin:  No rash, tattoos, scars, edema      PHYSICAL EXAM:   Vital Signs Last 24 Hrs  T(C): 37 (2022 04:57), Max: 37.1 (2022 13:00)  T(F): 98.6 (2022 04:57), Max: 98.8 (2022 17:00)  HR: 77 (2022 09:21) (72 - 83)  BP: 131/64 (2022 09:21) (131/64 - 164/84)  BP(mean): --  RR: 18 (2022 04:57) (18 - 18)  SpO2: 95% (2022 09:21) (94% - 98%)  Daily     Daily Weight in k.5 (2022 21:06)    GENERAL:  Appears stated age,   HEENT:  NC/AT,    CHEST:  Full & symmetric excursion,   HEART:  Regular rhythm,  ABDOMEN:  Soft, non-tender, non-distended,  EXTEREMITIES:  no cyanosis  SKIN:  No rash  NEURO:  Alert,       LABS:                        9.6    4.19  )-----------( 155      ( 2022 06:20 )             30.7   06-24    143  |  108  |  41<H>  ----------------------------<  182<H>  4.8   |  21<L>  |  1.98<H>    Ca    9.2      2022 06:20  Phos  3.5     06-24  Mg     2.0     06-24    TPro  6.8  /  Alb  3.4  /  TBili  0.2  /  DBili  x   /  AST  65<H>  /  ALT  144<H>  /  AlkPhos  132<H>              RADIOLOGY & ADDITIONAL TESTS:

## 2022-06-25 NOTE — PROGRESS NOTE ADULT - ASSESSMENT
66M w/ PMHx JEAN cirrhosis s/p OLT 7/2/20 (course c/b VRE bacteremia, CNI toxicity to both tacro and cyclosporine; switched to everolimus 7/27/20), HLD, IDDM, obesity, HFpEF presenting w/ fall and [facility], now transferred to [facility] for further management.     Impression:   #Fall, rib fracture: now second admission for falls c/b rib fracture. Per neurologic evaluation possibly 2/2 spinal canal stenosis     #Ureteral stone c/b hydronephrosis, stable/improved  #Elevated liver enzymes: liver biopsy 12/30/21 without e/o rejection, found to have NRH  #JEAN cirrhosis s/p LT  #CMV viremia   #DM  #Anemia    Recommendations:  - appreciate neuro evaluation re: recurrent falls, stating likely 2/2 spinal canal stenosis / cervical myelopathy  (NSGY with no plans for surgical intervention) vs may need EMG evaluation as an outpatient  - immunosuppression: everolimus 4 mg BID (dose by level), and CellCept 250 mg BID,  - stop Prednisone  - remains off valcyte per ID   - will need outpatient neurology follow up   - appreciate dispo planning; recommended for acute rehab      Recommendations incomplete until finalized by attending signature/attestation to note.    Thank you for involving us in the care of this patient, please reach out if any further questions.       Felix Romero MD  Gastroenterology/Hepatology Fellow  Contact on-call GI fellow via answering service ([phone]) from 5pm-8am AND on weekends/holidays

## 2022-06-26 LAB
ALBUMIN SERPL ELPH-MCNC: 3.4 G/DL — SIGNIFICANT CHANGE UP (ref 3.3–5)
ALP SERPL-CCNC: 132 U/L — HIGH (ref 40–120)
ALT FLD-CCNC: 127 U/L — HIGH (ref 10–45)
ANION GAP SERPL CALC-SCNC: 9 MMOL/L — SIGNIFICANT CHANGE UP (ref 5–17)
AST SERPL-CCNC: 58 U/L — HIGH (ref 10–40)
BILIRUB SERPL-MCNC: 0.2 MG/DL — SIGNIFICANT CHANGE UP (ref 0.2–1.2)
BUN SERPL-MCNC: 40 MG/DL — HIGH (ref 7–23)
CALCIUM SERPL-MCNC: 8.5 MG/DL — SIGNIFICANT CHANGE UP (ref 8.4–10.5)
CHLORIDE SERPL-SCNC: 107 MMOL/L — SIGNIFICANT CHANGE UP (ref 96–108)
CO2 SERPL-SCNC: 22 MMOL/L — SIGNIFICANT CHANGE UP (ref 22–31)
CREAT SERPL-MCNC: 1.77 MG/DL — HIGH (ref 0.5–1.3)
EGFR: 42 ML/MIN/1.73M2 — LOW
GLUCOSE BLDC GLUCOMTR-MCNC: 121 MG/DL — HIGH (ref 70–99)
GLUCOSE BLDC GLUCOMTR-MCNC: 174 MG/DL — HIGH (ref 70–99)
GLUCOSE BLDC GLUCOMTR-MCNC: 231 MG/DL — HIGH (ref 70–99)
GLUCOSE BLDC GLUCOMTR-MCNC: 94 MG/DL — SIGNIFICANT CHANGE UP (ref 70–99)
GLUCOSE SERPL-MCNC: 152 MG/DL — HIGH (ref 70–99)
HCT VFR BLD CALC: 30.3 % — LOW (ref 39–50)
HGB BLD-MCNC: 9.6 G/DL — LOW (ref 13–17)
MAGNESIUM SERPL-MCNC: 1.9 MG/DL — SIGNIFICANT CHANGE UP (ref 1.6–2.6)
MCHC RBC-ENTMCNC: 29.5 PG — SIGNIFICANT CHANGE UP (ref 27–34)
MCHC RBC-ENTMCNC: 31.7 GM/DL — LOW (ref 32–36)
MCV RBC AUTO: 93.2 FL — SIGNIFICANT CHANGE UP (ref 80–100)
NRBC # BLD: 0 /100 WBCS — SIGNIFICANT CHANGE UP (ref 0–0)
PHOSPHATE SERPL-MCNC: 3.3 MG/DL — SIGNIFICANT CHANGE UP (ref 2.5–4.5)
PLATELET # BLD AUTO: 181 K/UL — SIGNIFICANT CHANGE UP (ref 150–400)
POTASSIUM SERPL-MCNC: 4.1 MMOL/L — SIGNIFICANT CHANGE UP (ref 3.5–5.3)
POTASSIUM SERPL-SCNC: 4.1 MMOL/L — SIGNIFICANT CHANGE UP (ref 3.5–5.3)
PROT SERPL-MCNC: 6.5 G/DL — SIGNIFICANT CHANGE UP (ref 6–8.3)
RBC # BLD: 3.25 M/UL — LOW (ref 4.2–5.8)
RBC # FLD: 13 % — SIGNIFICANT CHANGE UP (ref 10.3–14.5)
SODIUM SERPL-SCNC: 138 MMOL/L — SIGNIFICANT CHANGE UP (ref 135–145)
WBC # BLD: 3.14 K/UL — LOW (ref 3.8–10.5)
WBC # FLD AUTO: 3.14 K/UL — LOW (ref 3.8–10.5)

## 2022-06-26 PROCEDURE — 99232 SBSQ HOSP IP/OBS MODERATE 35: CPT | Mod: GC

## 2022-06-26 RX ORDER — ACETAMINOPHEN 500 MG
650 TABLET ORAL ONCE
Refills: 0 | Status: COMPLETED | OUTPATIENT
Start: 2022-06-26 | End: 2022-06-26

## 2022-06-26 RX ADMIN — Medication 1 GRAM(S): at 18:10

## 2022-06-26 RX ADMIN — MYCOPHENOLATE MOFETIL 250 MILLIGRAM(S): 250 CAPSULE ORAL at 05:40

## 2022-06-26 RX ADMIN — MYCOPHENOLATE MOFETIL 250 MILLIGRAM(S): 250 CAPSULE ORAL at 18:10

## 2022-06-26 RX ADMIN — POLYETHYLENE GLYCOL 3350 17 GRAM(S): 17 POWDER, FOR SOLUTION ORAL at 12:51

## 2022-06-26 RX ADMIN — LIDOCAINE 1 PATCH: 4 CREAM TOPICAL at 12:50

## 2022-06-26 RX ADMIN — Medication 650 MILLIGRAM(S): at 22:01

## 2022-06-26 RX ADMIN — Medication 1 MILLIGRAM(S): at 12:51

## 2022-06-26 RX ADMIN — Medication 1 GRAM(S): at 05:40

## 2022-06-26 RX ADMIN — LIDOCAINE 1 PATCH: 4 CREAM TOPICAL at 21:27

## 2022-06-26 RX ADMIN — EVEROLIMUS 4 MILLIGRAM(S): 10 TABLET ORAL at 20:03

## 2022-06-26 RX ADMIN — TAMSULOSIN HYDROCHLORIDE 0.4 MILLIGRAM(S): 0.4 CAPSULE ORAL at 21:52

## 2022-06-26 RX ADMIN — TRAMADOL HYDROCHLORIDE 25 MILLIGRAM(S): 50 TABLET ORAL at 13:44

## 2022-06-26 RX ADMIN — Medication 650 MILLIGRAM(S): at 23:42

## 2022-06-26 RX ADMIN — Medication 10 UNIT(S): at 12:51

## 2022-06-26 RX ADMIN — HEPARIN SODIUM 5000 UNIT(S): 5000 INJECTION INTRAVENOUS; SUBCUTANEOUS at 18:11

## 2022-06-26 RX ADMIN — SERTRALINE 100 MILLIGRAM(S): 25 TABLET, FILM COATED ORAL at 12:51

## 2022-06-26 RX ADMIN — Medication 10 UNIT(S): at 09:14

## 2022-06-26 RX ADMIN — HEPARIN SODIUM 5000 UNIT(S): 5000 INJECTION INTRAVENOUS; SUBCUTANEOUS at 05:40

## 2022-06-26 RX ADMIN — Medication 2: at 09:14

## 2022-06-26 RX ADMIN — PANTOPRAZOLE SODIUM 40 MILLIGRAM(S): 20 TABLET, DELAYED RELEASE ORAL at 05:40

## 2022-06-26 RX ADMIN — INSULIN GLARGINE 14 UNIT(S): 100 INJECTION, SOLUTION SUBCUTANEOUS at 21:52

## 2022-06-26 RX ADMIN — TRAMADOL HYDROCHLORIDE 25 MILLIGRAM(S): 50 TABLET ORAL at 12:56

## 2022-06-26 RX ADMIN — EVEROLIMUS 4 MILLIGRAM(S): 10 TABLET ORAL at 08:25

## 2022-06-26 NOTE — PROGRESS NOTE ADULT - SUBJECTIVE AND OBJECTIVE BOX
Interval Events:   NAEON, afebrile HD stable  Doing well, denies complaints  Recommended for acute rehab    Hospital Medications:  artificial  tears Solution 1 Drop(s) Both EYES four times a day PRN  dextrose 5%. 1000 milliLiter(s) IV Continuous <Continuous>  dextrose 5%. 1000 milliLiter(s) IV Continuous <Continuous>  dextrose 50% Injectable 25 Gram(s) IV Push once  dextrose 50% Injectable 25 Gram(s) IV Push once  dextrose 50% Injectable 12.5 Gram(s) IV Push once  dextrose Oral Gel 15 Gram(s) Oral once PRN  everolimus (ZORTRESS) 4 milliGRAM(s) Oral <User Schedule>  folic acid 1 milliGRAM(s) Oral daily  glucagon  Injectable 1 milliGRAM(s) IntraMuscular once  heparin   Injectable 5000 Unit(s) SubCutaneous every 12 hours  insulin glargine Injectable (LANTUS) 14 Unit(s) SubCutaneous at bedtime  insulin lispro (ADMELOG) corrective regimen sliding scale   SubCutaneous three times a day before meals  insulin lispro (ADMELOG) corrective regimen sliding scale   SubCutaneous at bedtime  insulin lispro Injectable (ADMELOG) 10 Unit(s) SubCutaneous three times a day before meals  lidocaine   4% Patch 1 Patch Transdermal daily  lidocaine   4% Patch 1 Patch Transdermal daily  mycophenolate mofetil 250 milliGRAM(s) Oral two times a day  omega-3-Acid Ethyl Esters 1 Gram(s) Oral two times a day  pantoprazole    Tablet 40 milliGRAM(s) Oral before breakfast  polyethylene glycol 3350 17 Gram(s) Oral daily  sertraline 100 milliGRAM(s) Oral daily  tamsulosin 0.4 milliGRAM(s) Oral at bedtime  traMADol 25 milliGRAM(s) Oral every 12 hours PRN      ROS: All system reviewed and negative except as mentioned above.    PHYSICAL EXAM:   Vital Signs:  Vital Signs Last 24 Hrs  T(C): 36.6 (2022 06:34), Max: 36.9 (2022 21:00)  T(F): 97.9 (2022 06:34), Max: 98.4 (2022 21:00)  HR: 69 (2022 06:34) (69 - 79)  BP: 127/67 (2022 06:34) (127/67 - 157/81)  BP(mean): --  RR: 18 (2022 06:34) (18 - 18)  SpO2: 97% (2022 06:34) (97% - 97%)  Daily     Daily Weight in k (2022 06:34)    GENERAL:  NAD, Appears stated age  HEENT:  NC/AT,  conjunctivae clear and pink, sclera -anicteric  CHEST:  Normal Effort, Breath sounds clear  HEART:  RRR, S1 + S2, no murmurs  ABDOMEN:  Soft, non-tender, non-distended, normoactive bowel sounds,  no masses  EXTREMITIES:  no cyanosis or edema  SKIN:  Warm & Dry. No rash or erythema  NEURO:  Alert, oriented, no focal deficit    LABS:                        9.5    3.42  )-----------( 166      ( 2022 07:14 )             30.3     Mean Cell Volume: 92.9 fl (- @ 07:14)        139  |  106  |  45<H>  ----------------------------<  207<H>  4.5   |  21<L>  |  1.95<H>    Ca    9.0      2022 07:07  Phos  3.6       Mg     2.1         TPro  6.9  /  Alb  3.2<L>  /  TBili  0.2  /  DBili  x   /  AST  50<H>  /  ALT  128<H>  /  AlkPhos  136<H>  25    LIVER FUNCTIONS - ( 2022 07:07 )  Alb: 3.2 g/dL / Pro: 6.9 g/dL / ALK PHOS: 136 U/L / ALT: 128 U/L / AST: 50 U/L / GGT: x                                       9.5    3.42  )-----------( 166      ( 2022 07:14 )             30.3                         9.6    4.19  )-----------( 155      ( 2022 06:20 )             30.7       Imaging: Images reviewed.

## 2022-06-26 NOTE — PROGRESS NOTE ADULT - ASSESSMENT
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    s/p OLT 7/2/20 (course c/b VRE bacteremia, L Foot OM, CNI toxicity to both Tacrolimus and Cyclosporine; switched to Everolimus 7/27/20).  Recent syncope and fall, COVID all in 12/2022.  Transferred s/p fall from [facility]    s/p fall   - CT neck with mod-severe spinal stenosis with associated pain. Neuro/NeuroSx teams consulted, appreciate input  - MRI neck non-con with mod canal stenosis  - MRI lumbar completed.    - Neurosurgery: Can pursue rest of workup in outpatient setting. Can be referred to ortho spine and neurology (Dr Viera: [id number] [phone]) for EMG/NCS within 1-2 weeks after discharge. Given likely chronicity of pathology, low current clinical utility for steroids  - Continue PT/OT, pain management, fall precautions . Physiatry recommending Acute rehab. Declined by [facility] working on finding new facility     [] Hydronephrosis   -Appreciate  input  -continue PO/IV hydration and Flomax   -Renal U/S with no acute stone, mild hydro   -mild MISA on arrival, Cr trending down/stable. Urine clear    [] s/p OLT  - LFTs stable  - Atorvastatin DCd 6/21  - SQH/SCDs  - Daily CBC, CMP, INR, Mag, Phos, Everolimus level  - watch WBC     Immunosuppression  -continue Everolimus per level, MMF  250BID, dced pred   -PPx: off bactrim/valcyte/nystatin  - CMV +/-, Neg CMV PCR on 6/16    DM:  - glucose improving  - Continue lantus/lispro  - Continue ISS  - Diabetic diet    Dispo:  - awaiting dc to rehab, pending auth

## 2022-06-26 NOTE — PROGRESS NOTE ADULT - SUBJECTIVE AND OBJECTIVE BOX
Transplant Surgery - Multidisciplinary Rounds  --------------------------------------------------------------  OLT 7/2/2020    Present: Patient seen and examined with multidisciplinary Transplant team including  Surgeon: Dr. Gray. Hepatologist: Dr. Lee, [facility] and unit RN during AM rounds.   Disciplines not in attendance will be notified of the plan.     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    s/p OLT 7/2/20 course c/b VRE bacteremia, L foot OM, CNI toxicity to both Tacrolimus and Cyclosporine; switched to Everolimus 7/27/20    Admitted from 12/20/21 to 1/6/22 s/p fall at home, no LOC or injuries.  - Found to be +Covid w/ CT chest findings concerning for beginnings of multifocal PNA. Received remdesivir and mononclonal antibodies and on dexamethasone (12/29-1/7).   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 PCP prophylaxis changed from bactrim to atovaquone for LFTs.   -Patient was found to have DVT on L femoral vein and started on full treatment AC, now on Eliquis 5mg BID.     ------------  Transferred to [facility] from [facility] on 6/16/2022 where he was admitted for multiple falls over 24hr period.  +HA, +neck pain, +L rib pain.  Imaging revealed:  -C5-C7 mod-severe spinal stenosis  -L rib fx  -5mm distal L ureteral stone  -also w/ diarrhea    Interval Events:  - Afebrile, VSS  - Continues to be weak but reports some improvement.  - awaiting discharge to rehab facility     Immunosuppression:  Everolimus 4mg BID, MMF 250mg bid (due to GI symptoms)  Potential Discharge date: pending acceptance at acute rehab  Education:  Medications  Plan of care:  See Below    MEDICATIONS  (STANDING):  dextrose 5%. 1000 milliLiter(s) (50 mL/Hr) IV Continuous <Continuous>  dextrose 5%. 1000 milliLiter(s) (100 mL/Hr) IV Continuous <Continuous>  dextrose 50% Injectable 25 Gram(s) IV Push once  dextrose 50% Injectable 12.5 Gram(s) IV Push once  dextrose 50% Injectable 25 Gram(s) IV Push once  everolimus (ZORTRESS) 4 milliGRAM(s) Oral <User Schedule>  folic acid 1 milliGRAM(s) Oral daily  glucagon  Injectable 1 milliGRAM(s) IntraMuscular once  heparin   Injectable 5000 Unit(s) SubCutaneous every 12 hours  insulin glargine Injectable (LANTUS) 14 Unit(s) SubCutaneous at bedtime  insulin lispro (ADMELOG) corrective regimen sliding scale   SubCutaneous three times a day before meals  insulin lispro (ADMELOG) corrective regimen sliding scale   SubCutaneous at bedtime  insulin lispro Injectable (ADMELOG) 10 Unit(s) SubCutaneous three times a day before meals  lidocaine   4% Patch 1 Patch Transdermal daily  lidocaine   4% Patch 1 Patch Transdermal daily  mycophenolate mofetil 250 milliGRAM(s) Oral two times a day  omega-3-Acid Ethyl Esters 1 Gram(s) Oral two times a day  pantoprazole    Tablet 40 milliGRAM(s) Oral before breakfast  polyethylene glycol 3350 17 Gram(s) Oral daily  sertraline 100 milliGRAM(s) Oral daily  tamsulosin 0.4 milliGRAM(s) Oral at bedtime    MEDICATIONS  (PRN):  artificial  tears Solution 1 Drop(s) Both EYES four times a day PRN Dry Eyes  dextrose Oral Gel 15 Gram(s) Oral once PRN Blood Glucose LESS THAN 70 milliGRAM(s)/deciliter  traMADol 25 milliGRAM(s) Oral every 12 hours PRN Severe Pain (7 - 10)      PAST MEDICAL & SURGICAL HISTORY:  Diabetes  Hypercholesteremia  Neuropathy  Hypertension  Renal stones 25 years ago  Fatty liver disease, nonalcoholic  Obesity  Hepatic encephalopathy  GERD with esophagitis Gastritis &amp; Non Bleeding Ulcers  GIB (gastrointestinal bleeding)  S/P cholecystectomy    Vital Signs Last 24 Hrs  T(C): 36.6 (26 Jun 2022 06:34), Max: 36.9 (25 Jun 2022 21:00)  T(F): 97.9 (26 Jun 2022 06:34), Max: 98.4 (25 Jun 2022 21:00)  HR: 69 (26 Jun 2022 06:34) (69 - 79)  BP: 127/67 (26 Jun 2022 06:34) (127/67 - 157/81)  BP(mean): --  RR: 18 (26 Jun 2022 06:34) (18 - 18)  SpO2: 97% (26 Jun 2022 06:34) (97% - 97%)    I&O's Summary    25 Jun 2022 07:01  -  26 Jun 2022 07:00  --------------------------------------------------------  IN: 600 mL / OUT: 1700 mL / NET: -1100 mL    26 Jun 2022 07:01  -  26 Jun 2022 11:01  --------------------------------------------------------  IN: 240 mL / OUT: 300 mL / NET: -60 mL                         9.6    3.14  )-----------( 181      ( 26 Jun 2022 07:01 )             30.3     06-26    138  |  107  |  40<H>  ----------------------------<  152<H>  4.1   |  22  |  1.77<H>    Ca    8.5      26 Jun 2022 07:00  Phos  3.3     06-26  Mg     1.9     06-26    TPro  6.5  /  Alb  3.4  /  TBili  0.2  /  DBili  x   /  AST  58<H>  /  ALT  127<H>  /  AlkPhos  132<H>  06-26      Review of systems  Gen: No weight changes, fatigue, fevers/chills, see hpi  Skin: No rashes  Head/Eyes/Ears/Mouth: No headache; Normal hearing; Normal vision w/o blurriness; No sinus pain/discomfort, sore throat  Respiratory: No dyspnea, cough, wheezing, hemoptysis  CV: No chest pain, PND, orthopnea  GI:  no diarrhea, constipation, nausea, vomiting, melena, hematochezia  : No increased frequency, dysuria, hematuria, nocturia  MSK: see hpi, no edema  Neuro: No dizziness/lightheadedness, weakness, seizures, numbness, tingling  Heme: No easy bruising or bleeding  Endo: No heat/cold intolerance  Psych: No significant nervousness, anxiety, stress, depression  All other systems were reviewed and are negative, except as noted.      PHYSICAL EXAM:  Constitutional: Well developed / well nourished  Eyes: anicteric, PERRLA  ENMT: nc/at, no thrush, loose front tooth  Neck: supple  Respiratory: CTA B/L  Cardiovascular: RRR.  Gastrointestinal: Soft abdomen, ND/NT. well healed incisional scar  Genitourinary: Voiding spontaneously  Extremities: SCD's in place and working bilaterally, no calf TTP. no edema. L rib pain to palpation  Vascular: Palpable dp pulses bilaterally.   Neurological: A&O x3  Skin: no rashes, ulcerations, lesions  Musculoskeletal: Moving all extremities, decreased strength throughout  Psychiatric: Responsive

## 2022-06-26 NOTE — PROGRESS NOTE ADULT - ASSESSMENT
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 for further management.     Impression:   #Fall, rib fracture: now second admission for falls c/b rib fracture. Per neurologic evaluation possibly 2/2 spinal canal stenosis     #Ureteral stone c/b hydronephrosis, stable/improved  #Elevated liver enzymes: liver biopsy 12/30/21 without e/o rejection, found to have NRH  #JEAN cirrhosis s/p LT  #CMV viremia   #DM  #Anemia    Recommendations:  - appreciate neuro evaluation re: recurrent falls, stating likely 2/2 spinal canal stenosis / cervical myelopathy  (NSGY with no plans for surgical intervention) vs may need EMG evaluation as an outpatient  - immunosuppression: everolimus 4 mg BID (dose by level)  - remains off valcyte per ID   - will need outpatient neurology follow up   - appreciate dispo planning; recommended for acute rehab      Recommendations incomplete until finalized by attending signature/attestation to note.    Thank you for involving us in the care of this patient, please reach out if any further questions.       Felix Romero MD  Gastroenterology/Hepatology Fellow  Contact on-call GI fellow via answering service ([phone]) from 5pm-8am AND on weekends/holidays

## 2022-06-27 DIAGNOSIS — N18.9 CHRONIC KIDNEY DISEASE, UNSPECIFIED: ICD-10-CM

## 2022-06-27 LAB
ALBUMIN SERPL ELPH-MCNC: 3.5 G/DL — SIGNIFICANT CHANGE UP (ref 3.3–5)
ALP SERPL-CCNC: 146 U/L — HIGH (ref 40–120)
ALT FLD-CCNC: 123 U/L — HIGH (ref 10–45)
ANION GAP SERPL CALC-SCNC: 12 MMOL/L — SIGNIFICANT CHANGE UP (ref 5–17)
AST SERPL-CCNC: 47 U/L — HIGH (ref 10–40)
BASOPHILS # BLD AUTO: 0.03 K/UL — SIGNIFICANT CHANGE UP (ref 0–0.2)
BASOPHILS NFR BLD AUTO: 1 % — SIGNIFICANT CHANGE UP (ref 0–2)
BILIRUB SERPL-MCNC: 0.2 MG/DL — SIGNIFICANT CHANGE UP (ref 0.2–1.2)
BUN SERPL-MCNC: 44 MG/DL — HIGH (ref 7–23)
CALCIUM SERPL-MCNC: 9 MG/DL — SIGNIFICANT CHANGE UP (ref 8.4–10.5)
CHLORIDE SERPL-SCNC: 107 MMOL/L — SIGNIFICANT CHANGE UP (ref 96–108)
CO2 SERPL-SCNC: 22 MMOL/L — SIGNIFICANT CHANGE UP (ref 22–31)
CREAT SERPL-MCNC: 2.25 MG/DL — HIGH (ref 0.5–1.3)
EGFR: 31 ML/MIN/1.73M2 — LOW
EOSINOPHIL # BLD AUTO: 0.07 K/UL — SIGNIFICANT CHANGE UP (ref 0–0.5)
EOSINOPHIL NFR BLD AUTO: 2.4 % — SIGNIFICANT CHANGE UP (ref 0–6)
EVEROLIMUS, WHOLE BLOOD RESULT: 6.6 NG/ML — SIGNIFICANT CHANGE UP (ref 3–8)
EVEROLIMUS, WHOLE BLOOD RESULT: 7.6 NG/ML — SIGNIFICANT CHANGE UP (ref 3–8)
EVEROLIMUS, WHOLE BLOOD RESULT: 8.2 NG/ML — HIGH (ref 3–8)
GLUCOSE BLDC GLUCOMTR-MCNC: 123 MG/DL — HIGH (ref 70–99)
GLUCOSE BLDC GLUCOMTR-MCNC: 128 MG/DL — HIGH (ref 70–99)
GLUCOSE BLDC GLUCOMTR-MCNC: 138 MG/DL — HIGH (ref 70–99)
GLUCOSE BLDC GLUCOMTR-MCNC: 156 MG/DL — HIGH (ref 70–99)
GLUCOSE SERPL-MCNC: 228 MG/DL — HIGH (ref 70–99)
HCT VFR BLD CALC: 30.9 % — LOW (ref 39–50)
HGB BLD-MCNC: 9.5 G/DL — LOW (ref 13–17)
IMM GRANULOCYTES NFR BLD AUTO: 0.3 % — SIGNIFICANT CHANGE UP (ref 0–1.5)
LYMPHOCYTES # BLD AUTO: 0.9 K/UL — LOW (ref 1–3.3)
LYMPHOCYTES # BLD AUTO: 30.5 % — SIGNIFICANT CHANGE UP (ref 13–44)
MAGNESIUM SERPL-MCNC: 2.1 MG/DL — SIGNIFICANT CHANGE UP (ref 1.6–2.6)
MCHC RBC-ENTMCNC: 29.6 PG — SIGNIFICANT CHANGE UP (ref 27–34)
MCHC RBC-ENTMCNC: 30.7 GM/DL — LOW (ref 32–36)
MCV RBC AUTO: 96.3 FL — SIGNIFICANT CHANGE UP (ref 80–100)
MONOCYTES # BLD AUTO: 0.43 K/UL — SIGNIFICANT CHANGE UP (ref 0–0.9)
MONOCYTES NFR BLD AUTO: 14.6 % — HIGH (ref 2–14)
NEUTROPHILS # BLD AUTO: 1.51 K/UL — LOW (ref 1.8–7.4)
NEUTROPHILS NFR BLD AUTO: 51.2 % — SIGNIFICANT CHANGE UP (ref 43–77)
NRBC # BLD: 0 /100 WBCS — SIGNIFICANT CHANGE UP (ref 0–0)
PHOSPHATE SERPL-MCNC: 3.6 MG/DL — SIGNIFICANT CHANGE UP (ref 2.5–4.5)
PLATELET # BLD AUTO: 202 K/UL — SIGNIFICANT CHANGE UP (ref 150–400)
POTASSIUM SERPL-MCNC: 4.3 MMOL/L — SIGNIFICANT CHANGE UP (ref 3.5–5.3)
POTASSIUM SERPL-SCNC: 4.3 MMOL/L — SIGNIFICANT CHANGE UP (ref 3.5–5.3)
PROT SERPL-MCNC: 6.6 G/DL — SIGNIFICANT CHANGE UP (ref 6–8.3)
RBC # BLD: 3.21 M/UL — LOW (ref 4.2–5.8)
RBC # FLD: 12.8 % — SIGNIFICANT CHANGE UP (ref 10.3–14.5)
SODIUM SERPL-SCNC: 141 MMOL/L — SIGNIFICANT CHANGE UP (ref 135–145)
WBC # BLD: 2.95 K/UL — LOW (ref 3.8–10.5)
WBC # FLD AUTO: 2.95 K/UL — LOW (ref 3.8–10.5)

## 2022-06-27 PROCEDURE — 99232 SBSQ HOSP IP/OBS MODERATE 35: CPT

## 2022-06-27 PROCEDURE — 99222 1ST HOSP IP/OBS MODERATE 55: CPT | Mod: GC

## 2022-06-27 RX ORDER — ACETAMINOPHEN 500 MG
1000 TABLET ORAL ONCE
Refills: 0 | Status: COMPLETED | OUTPATIENT
Start: 2022-06-27 | End: 2022-06-27

## 2022-06-27 RX ADMIN — TRAMADOL HYDROCHLORIDE 25 MILLIGRAM(S): 50 TABLET ORAL at 18:56

## 2022-06-27 RX ADMIN — Medication 400 MILLIGRAM(S): at 23:38

## 2022-06-27 RX ADMIN — EVEROLIMUS 4 MILLIGRAM(S): 10 TABLET ORAL at 20:27

## 2022-06-27 RX ADMIN — PANTOPRAZOLE SODIUM 40 MILLIGRAM(S): 20 TABLET, DELAYED RELEASE ORAL at 09:17

## 2022-06-27 RX ADMIN — LIDOCAINE 1 PATCH: 4 CREAM TOPICAL at 13:32

## 2022-06-27 RX ADMIN — Medication 10 UNIT(S): at 17:33

## 2022-06-27 RX ADMIN — SERTRALINE 100 MILLIGRAM(S): 25 TABLET, FILM COATED ORAL at 13:33

## 2022-06-27 RX ADMIN — TAMSULOSIN HYDROCHLORIDE 0.4 MILLIGRAM(S): 0.4 CAPSULE ORAL at 20:27

## 2022-06-27 RX ADMIN — Medication 1 GRAM(S): at 05:40

## 2022-06-27 RX ADMIN — Medication 10 UNIT(S): at 09:16

## 2022-06-27 RX ADMIN — HEPARIN SODIUM 5000 UNIT(S): 5000 INJECTION INTRAVENOUS; SUBCUTANEOUS at 17:32

## 2022-06-27 RX ADMIN — Medication 10 UNIT(S): at 13:32

## 2022-06-27 RX ADMIN — INSULIN GLARGINE 14 UNIT(S): 100 INJECTION, SOLUTION SUBCUTANEOUS at 22:00

## 2022-06-27 RX ADMIN — EVEROLIMUS 4 MILLIGRAM(S): 10 TABLET ORAL at 09:55

## 2022-06-27 RX ADMIN — MYCOPHENOLATE MOFETIL 250 MILLIGRAM(S): 250 CAPSULE ORAL at 05:41

## 2022-06-27 RX ADMIN — POLYETHYLENE GLYCOL 3350 17 GRAM(S): 17 POWDER, FOR SOLUTION ORAL at 13:33

## 2022-06-27 RX ADMIN — HEPARIN SODIUM 5000 UNIT(S): 5000 INJECTION INTRAVENOUS; SUBCUTANEOUS at 05:41

## 2022-06-27 RX ADMIN — Medication 1 MILLIGRAM(S): at 13:33

## 2022-06-27 RX ADMIN — Medication 1 GRAM(S): at 17:33

## 2022-06-27 RX ADMIN — MYCOPHENOLATE MOFETIL 250 MILLIGRAM(S): 250 CAPSULE ORAL at 17:33

## 2022-06-27 RX ADMIN — TRAMADOL HYDROCHLORIDE 25 MILLIGRAM(S): 50 TABLET ORAL at 17:31

## 2022-06-27 RX ADMIN — Medication 2: at 09:16

## 2022-06-27 NOTE — PROGRESS NOTE ADULT - ASSESSMENT
66M w/ PMHx JEAN cirrhosis s/p OLT 7/2/20 (course c/b VRE bacteremia, CNI toxicity to both tacro and cyclosporine; switched to everolimus 7/27/20), HLD, IDDM, obesity, HFpEF presenting w/ fall and ureteral stone to [facility] on 6/15/22, now transferred to [facility] for further management.     Impression:   # CKD - slightly worsening creatinine - repeat kidney US planned  #Fall, rib fracture: now second admission for falls c/b rib fracture. Per neurologic evaluation possibly 2/2 spinal canal stenosis   #Ureteral stone c/b hydronephrosis, stable/improved  #Elevated liver enzymes: liver biopsy 12/30/21 without e/o rejection, found to have NRH  #JEAN cirrhosis s/p LT  #CMV viremia   #DM  #Anemia    Recommendations:  - Repeat renal US  - Check orthostatic hypotension  - appreciate neuro evaluation re: recurrent falls, stating likely 2/2 spinal canal stenosis / cervical myelopathy  (NSGY with no plans for surgical intervention) vs may need EMG evaluation as an outpatient  - immunosuppression: everolimus 4 mg BID (dose by level)  - remains off valcyte per ID   - will need outpatient neurology follow up   - appreciate dispo planning; recommended for acute rehab    Thank you for involving us in the care of this patient. Please reach out if any further questions.    Adonis Rubio, PGY-5  Hepatology Fellow    Available on [employer] Teams  Pager [phone] ([facility]) or [phone] ([facility])  After 5PM/Weekends, please contact the on-call GI fellow: [phone]  Available through [employer] Teams

## 2022-06-27 NOTE — PROGRESS NOTE ADULT - SUBJECTIVE AND OBJECTIVE BOX
INTERVAL HPI/OVERNIGHT EVENTS:  Events noted     MEDICATIONS  (STANDING):  atorvastatin 80 milliGRAM(s) Oral at bedtime  dextrose 5%. 1000 milliLiter(s) (100 mL/Hr) IV Continuous <Continuous>  dextrose 5%. 1000 milliLiter(s) (50 mL/Hr) IV Continuous <Continuous>  dextrose 50% Injectable 25 Gram(s) IV Push once  dextrose 50% Injectable 12.5 Gram(s) IV Push once  dextrose 50% Injectable 25 Gram(s) IV Push once  everolimus (ZORTRESS) 4 milliGRAM(s) Oral <User Schedule>  folic acid 1 milliGRAM(s) Oral daily  glucagon  Injectable 1 milliGRAM(s) IntraMuscular once  heparin   Injectable 5000 Unit(s) SubCutaneous every 12 hours  insulin glargine Injectable (LANTUS) 8 Unit(s) SubCutaneous at bedtime  insulin lispro (ADMELOG) corrective regimen sliding scale   SubCutaneous three times a day before meals  insulin lispro (ADMELOG) corrective regimen sliding scale   SubCutaneous at bedtime  insulin lispro Injectable (ADMELOG) 6 Unit(s) SubCutaneous three times a day before meals  lidocaine   4% Patch 1 Patch Transdermal daily  lidocaine   4% Patch 1 Patch Transdermal daily  mycophenolate mofetil 250 milliGRAM(s) Oral two times a day  omega-3-Acid Ethyl Esters 1 Gram(s) Oral two times a day  pantoprazole    Tablet 40 milliGRAM(s) Oral before breakfast  polyethylene glycol 3350 17 Gram(s) Oral once  predniSONE   Tablet 5 milliGRAM(s) Oral daily  sertraline 100 milliGRAM(s) Oral daily  sodium chloride 0.9%. 1000 milliLiter(s) (50 mL/Hr) IV Continuous <Continuous>  tamsulosin 0.4 milliGRAM(s) Oral at bedtime    MEDICATIONS  (PRN):  dextrose Oral Gel 15 Gram(s) Oral once PRN Blood Glucose LESS THAN 70 milliGRAM(s)/deciliter  traMADol 25 milliGRAM(s) Oral every 12 hours PRN Severe Pain (7 - 10)      Allergies    codeine (Anaphylaxis)    Intolerances        ROS:   General:  No wt loss, fevers, chills, night sweats, fatigue,   Eyes:  Good vision, no reported pain  ENT:  No sore throat, pain, runny nose, dysphagia  CV:  No pain, palpitations, hypo/hypertension  Resp:  No dyspnea, cough, tachypnea, wheezing  GI:  No pain, No nausea, No vomiting, No diarrhea, No constipation, No weight loss, No fever, No pruritis, No rectal bleeding, No tarry stools, No dysphagia,  :  No pain, bleeding, incontinence, nocturia  Muscle:  No pain, weakness  Neuro:  No weakness, tingling, memory problems  Psych:  No fatigue, insomnia, mood problems, depression  Endocrine:  No polyuria, polydipsia, cold/heat intolerance  Heme:  No petechiae, ecchymosis, easy bruisability  Skin:  No rash, tattoos, scars, edema      PHYSICAL EXAM:   Vital Signs Last 24 Hrs  T(C): 36.5 (2022 05:52), Max: 37 (2022 12:57)  T(F): 97.7 (2022 05:52), Max: 98.6 (2022 12:57)  HR: 71 (2022 05:52) (71 - 78)  BP: 105/61 (2022 05:52) (105/61 - 169/77)  BP(mean): --  RR: 18 (2022 05:52) (18 - 18)  SpO2: 96% (2022 05:52) (94% - 96%)  Daily     Daily Weight in k.5 (2022 21:06)    GENERAL:  Appears stated age,   HEENT:  NC/AT,    CHEST:  Full & symmetric excursion,   HEART:  Regular rhythm,  ABDOMEN:  Soft, non-tender, non-distended,  EXTEREMITIES:  no cyanosis  SKIN:  No rash  NEURO:  Alert,       LABS:                        9.5    2.95  )-----------( 202      ( 2022 06:27 )             30.9       141  |  107  |  44<H>  ----------------------------<  228<H>  4.3   |  22  |  2.25<H>    Ca    9.0      2022 06:27  Phos  3.6       Mg     2.1         TPro  6.6  /  Alb  3.5  /  TBili  0.2  /  DBili  x   /  AST  47<H>  /  ALT  123<H>  /  AlkPhos  146<H>              RADIOLOGY & ADDITIONAL TESTS:

## 2022-06-27 NOTE — PROGRESS NOTE ADULT - ASSESSMENT
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    s/p OLT 7/2/20 (course c/b VRE bacteremia, L Foot OM, CNI toxicity to both Tacrolimus and Cyclosporine; switched to Everolimus 7/27/20).  Recent syncope and fall, COVID all in 12/2022.  Transferred s/p fall from [place]    s/p fall   - CT neck with mod-severe spinal stenosis with associated pain. Neuro/NeuroSx teams consulted, appreciate input  - MRI neck non-con with mod canal stenosis  - MRI lumbar completed.    - Neurosurgery: Can pursue rest of workup in outpatient setting. Can be referred to ortho spine and neurology (Dr Viera: [phone]) for EMG/NCS within 1-2 weeks after discharge. Given likely chronicity of pathology, low current clinical utility for steroids  - Continue PT/OT, pain management, fall precautions . Physiatry recommending Acute rehab. Declined by Jerry Cordero CM working on finding new facility   - Check orthostatic BP     [] Hydronephrosis, L ureteral stone  - Appreciate  input, cont flomax  - Repeat Renal US today     [] s/p OLT  - LFTs stable  - Atorvastatin DCd 6/21  - SQH/SCDs  - Daily CBC, CMP, INR, Mag, Phos, Everolimus level  - watch WBC, leukopenic. Off valcyte, MMF 250bid     Immunosuppression  -continue Everolimus per level, MMF  250BID, dced pred   -PPx: off bactrim/valcyte/nystatin  - CMV +/-, Neg CMV PCR on 6/16    DM:  - glucose improving  - Continue lantus/lispro  - Continue ISS  - Diabetic diet    Dispo:  - awaiting dc to rehab, pending auth

## 2022-06-27 NOTE — PROGRESS NOTE ADULT - SUBJECTIVE AND OBJECTIVE BOX
[facility] DIVISION OF KIDNEY DISEASES AND HYPERTENSION -- INITIAL CONSULT NOTE  --------------------------------------------------------------------------------    HPI:  Pt. is a 66 y.o. M w/ PMHx of DM, HLD, obesity, HFpEF with mild LV diastolic dysfunction, MGUS, AOCD, with a history of duodenal ulcer as well as GAVE and duodenal AVM s/p APC (last on 10/11/19), decompensated JEAN/Cirrhosis (ascites/SBP/HE), multiple UTIs, history of JEAN/Cirrhosis 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 on immunosuppression with Prednisone 5mg, Everolimus 4mg and CellCept 250mg BID and CKD is admitted for fall. Pt. denies any lightheadedness, dizziness, chest pain or issues passing urine. Pt. noted to have positive orthostatic VS however asymptomatic today.         PAST HISTORY  --------------------------------------------------------------------------------  PAST MEDICAL & SURGICAL HISTORY:  Diabetes      Hypercholesteremia      Neuropathy      Hypertension      Renal stones  25 years ago      Fatty liver disease, nonalcoholic      Obesity      Hepatic encephalopathy      GERD with esophagitis  Gastritis &amp; Non Bleeding Ulcers      GIB (gastrointestinal bleeding)      S/P cholecystectomy        FAMILY HISTORY:  Family history of stomach cancer    Family history of hypertension    Family history of type 2 diabetes mellitus      Social History:  denies smoking, ETOH or drug use (16 Jun 2022 06:56)        ALLERGIES & MEDICATIONS  --------------------------------------------------------------------------------  Allergies    codeine (Anaphylaxis)    Intolerances      Standing Inpatient Medications  dextrose 5%. 1000 milliLiter(s) IV Continuous <Continuous>  dextrose 5%. 1000 milliLiter(s) IV Continuous <Continuous>  dextrose 50% Injectable 25 Gram(s) IV Push once  dextrose 50% Injectable 12.5 Gram(s) IV Push once  dextrose 50% Injectable 25 Gram(s) IV Push once  everolimus (ZORTRESS) 4 milliGRAM(s) Oral <User Schedule>  folic acid 1 milliGRAM(s) Oral daily  glucagon  Injectable 1 milliGRAM(s) IntraMuscular once  heparin   Injectable 5000 Unit(s) SubCutaneous every 12 hours  insulin glargine Injectable (LANTUS) 14 Unit(s) SubCutaneous at bedtime  insulin lispro (ADMELOG) corrective regimen sliding scale   SubCutaneous three times a day before meals  insulin lispro (ADMELOG) corrective regimen sliding scale   SubCutaneous at bedtime  insulin lispro Injectable (ADMELOG) 10 Unit(s) SubCutaneous three times a day before meals  lidocaine   4% Patch 1 Patch Transdermal daily  lidocaine   4% Patch 1 Patch Transdermal daily  mycophenolate mofetil 250 milliGRAM(s) Oral two times a day  omega-3-Acid Ethyl Esters 1 Gram(s) Oral two times a day  pantoprazole    Tablet 40 milliGRAM(s) Oral before breakfast  polyethylene glycol 3350 17 Gram(s) Oral daily  sertraline 100 milliGRAM(s) Oral daily  tamsulosin 0.4 milliGRAM(s) Oral at bedtime    PRN Inpatient Medications  artificial  tears Solution 1 Drop(s) Both EYES four times a day PRN  dextrose Oral Gel 15 Gram(s) Oral once PRN  traMADol 25 milliGRAM(s) Oral every 12 hours PRN      REVIEW OF SYSTEMS  --------------------------------------------------------------------------------  Gen: No weight changes, fatigue, fevers/chills, weakness  Skin: No rashes  Head/Eyes/Ears/Mouth: No headache; Normal hearing; Normal vision w/o blurriness; No sinus pain/discomfort, sore throat  Respiratory: No dyspnea, cough, wheezing, hemoptysis  CV: No chest pain, PND, orthopnea  GI: No abdominal pain, diarrhea, constipation, nausea, vomiting, melena, hematochezia  : No increased frequency, dysuria, hematuria, nocturia  MSK: No joint pain/swelling; no back pain; no edema  Neuro: No dizziness/lightheadedness, weakness, seizures, numbness, tingling  Heme: No easy bruising or bleeding  Endo: No heat/cold intolerance  Psych: No significant nervousness, anxiety, stress, depression        VITALS/PHYSICAL EXAM  --------------------------------------------------------------------------------  T(C): 36.5 (06-27-22 @ 05:52), Max: 36.9 (06-26-22 @ 21:15)  HR: 67 (06-27-22 @ 13:02) (67 - 78)  BP: 136/71 (06-27-22 @ 13:02) (105/61 - 169/77)  RR: 18 (06-27-22 @ 13:02) (18 - 18)  SpO2: 97% (06-27-22 @ 13:02) (94% - 97%)  Wt(kg): --  Height (cm): 185.4 (06-27-22 @ 11:19)      06-26-22 @ 07:01  -  06-27-22 @ 07:00  --------------------------------------------------------  IN: 720 mL / OUT: 1775 mL / NET: -1055 mL    06-27-22 @ 07:01  -  06-27-22 @ 15:01  --------------------------------------------------------  IN: 240 mL / OUT: 275 mL / NET: -35 mL      Physical Exam:  	Gen: Not in distress, well-appearing  	HEENT: no scleral icterus, moist oral mucosa. No thrush. Supple neck, no JVD  	Pulm: normal respiratory effort, lungs clear to auscultation bilaterally   	CV: regular rate and rhythm, S1 and S2 normal, no murmur   	Abd: normoactive bowel sounds, soft and non distended abdomen. No tenderness, guarding or rigidity                    Transplant non tender, no bruit  	: No suprapubic tenderness          Back: No spinal or CVA tenderness; no pre sacral edema          Extremities: No edema. Distal pulses 2+ bilaterally           Skin: warm no rash, no cyanosis   	Neuro: Alert and oriented to person, place and time. Normal speech. Normal affect.       LABS/STUDIES  --------------------------------------------------------------------------------              9.5    2.95  >-----------<  202      [06-27-22 @ 06:27]              30.9     141  |  107  |  44  ----------------------------<  228      [06-27-22 @ 06:27]  4.3   |  22  |  2.25        Ca     9.0     [06-27-22 @ 06:27]      Mg     2.1     [06-27-22 @ 06:27]      Phos  3.6     [06-27-22 @ 06:27]    TPro  6.6  /  Alb  3.5  /  TBili  0.2  /  DBili  x   /  AST  47  /  ALT  123  /  AlkPhos  146  [06-27-22 @ 06:27]          Creatinine Trend:  SCr 2.25 [06-27 @ 06:27]  SCr 1.77 [06-26 @ 07:00]  SCr 1.95 [06-25 @ 07:07]  SCr 1.98 [06-24 @ 06:20]  SCr 2.01 [06-23 @ 05:59]    Urinalysis - [06-15-22 @ 18:55]      Color Yellow / Appearance Slightly Turbid / SG 1.020 / pH 5.0      Gluc 1000 / Ketone Negative  / Bili Negative / Urobili Negative       Blood Large / Protein 100 / Leuk Est Moderate / Nitrite Negative      RBC 11-25 / WBC 26-50 / Hyaline  / Gran  / Sq Epi  / Non Sq Epi Occasional / Bacteria Few      Iron 27, TIBC 121, %sat 22      [12-23-21 @ 08:43]  Ferritin 702      [01-04-22 @ 09:14]  Vitamin D (25OH) 21.4      [06-19-22 @ 12:19]  HbA1c 6.4      [02-12-20 @ 17:08]  TSH 1.45      [06-19-22 @ 12:19]    HBsAb <3.0      [12-04-19 @ 08:33]  HBsAb Nonreact      [06-13-20 @ 10:35]  HBsAg Nonreact      [12-19-20 @ 14:40]  HBcAb Nonreact      [06-13-20 @ 10:35]  HCV 0.06, Nonreact      [12-19-20 @ 14:40]  HIV Nonreact      [10-23-19 @ 05:08]    SRAVAN: titer Negative, pattern --      [12-03-19 @ 09:01]  dsDNA <12      [02-05-18 @ 18:48]  C3 Complement 158      [12-17-20 @ 16:43]  C4 Complement 38      [12-17-20 @ 16:43]  ANCA: cANCA Negative, pANCA Negative, atypical ANCA Negative      [02-05-18 @ 18:48]  Syphilis Screen (Treponema Pallidum Ab) Negative      [06-19-22 @ 12:19]  Free Light Chains: kappa 10.93, lambda 3.76, ratio = 2.91      [12-06 @ 09:43]  Immunofixation Serum:   Abnormal pattern identified which is not fully diagnostic; however, the  pattern raises the possibility of a weak restricted band.  Advise  clinical correlation.    Reference Range: None Detected    g      [12-29-20 @ 08:59]  SPEP Interpretation: Two Weak Gamma-Migrating Paraproteins Identified      [04-29-20 @ 08:20]  Immunofixation Urine: Reference Range: None Detected      [12-04-19 @ 14:03]  UPEP Interpretation: Weak Gamma-Migrating Paraprotein Identified      [12-04-19 @ 14:03]    Tacrolimus  Cyclosporine  Sirolimus  Mycophenolate  BK PCR  CMV PCRCMVPCR Log: NotDetec Ppb44OY/mL (06-16 @ 17:21)    Parvo PCR  EBV PCR

## 2022-06-27 NOTE — PROGRESS NOTE ADULT - SUBJECTIVE AND OBJECTIVE BOX
Interval Events:   - No acute events    Allergies:  codeine (Anaphylaxis)        Hospital Medications:  artificial  tears Solution 1 Drop(s) Both EYES four times a day PRN  dextrose 5%. 1000 milliLiter(s) IV Continuous <Continuous>  dextrose 5%. 1000 milliLiter(s) IV Continuous <Continuous>  dextrose 50% Injectable 25 Gram(s) IV Push once  dextrose 50% Injectable 12.5 Gram(s) IV Push once  dextrose 50% Injectable 25 Gram(s) IV Push once  dextrose Oral Gel 15 Gram(s) Oral once PRN  everolimus (ZORTRESS) 4 milliGRAM(s) Oral <User Schedule>  folic acid 1 milliGRAM(s) Oral daily  glucagon  Injectable 1 milliGRAM(s) IntraMuscular once  heparin   Injectable 5000 Unit(s) SubCutaneous every 12 hours  insulin glargine Injectable (LANTUS) 14 Unit(s) SubCutaneous at bedtime  insulin lispro (ADMELOG) corrective regimen sliding scale   SubCutaneous three times a day before meals  insulin lispro (ADMELOG) corrective regimen sliding scale   SubCutaneous at bedtime  insulin lispro Injectable (ADMELOG) 10 Unit(s) SubCutaneous three times a day before meals  lidocaine   4% Patch 1 Patch Transdermal daily  lidocaine   4% Patch 1 Patch Transdermal daily  mycophenolate mofetil 250 milliGRAM(s) Oral two times a day  omega-3-Acid Ethyl Esters 1 Gram(s) Oral two times a day  pantoprazole    Tablet 40 milliGRAM(s) Oral before breakfast  polyethylene glycol 3350 17 Gram(s) Oral daily  sertraline 100 milliGRAM(s) Oral daily  tamsulosin 0.4 milliGRAM(s) Oral at bedtime  traMADol 25 milliGRAM(s) Oral every 12 hours PRN      PMHX/PSHX:  Diabetes    Hypercholesteremia    Neuropathy    Hypertension    Renal stones    Fatty liver disease, nonalcoholic    Obesity    Hepatic encephalopathy    GERD with esophagitis    GIB (gastrointestinal bleeding)    No significant past surgical history    S/P cholecystectomy        Family history:  No pertinent family history in first degree relatives    Family history of stomach cancer    Family history of hypertension    Family history of type 2 diabetes mellitus        ROS: As per HPI, otherwise 14-point ROS reviewed and negative.      PHYSICAL EXAM:   Vital Signs:  Vital Signs Last 24 Hrs  T(C): 36.5 (2022 05:52), Max: 37 (2022 12:57)  T(F): 97.7 (2022 05:52), Max: 98.6 (2022 12:57)  HR: 71 (2022 05:52) (71 - 78)  BP: 105/61 (2022 05:52) (105/61 - 169/77)  BP(mean): --  RR: 18 (2022 05:52) (18 - 18)  SpO2: 96% (2022 05:52) (94% - 96%)  Daily Height in cm: 185.42 (2022 11:19)    Daily Weight in k.5 (2022 05:52)      22 @ 07:01  -  22 @ 07:00  --------------------------------------------------------  IN: 720 mL / OUT: 1775 mL / NET: -1055 mL    22 @ 07:01  -  22 @ 12:23  --------------------------------------------------------  IN: 240 mL / OUT: 275 mL / NET: -35 mL    GENERAL:  NAD, Appears stated age  HEENT:  NC/AT,  conjunctivae clear and pink, sclera -anicteric  CHEST:  Normal Effort, Breath sounds clear  HEART:  RRR, S1 + S2, no murmurs  ABDOMEN:  Soft, non-tender, non-distended, normoactive bowel sounds,  no masses  EXTREMITIES:  no cyanosis or edema  SKIN:  Warm & Dry. No rash or erythema  NEURO:  Alert, oriented, no focal deficit    LABS:                        9.5    2.95  )-----------( 202      ( 2022 06:27 )             30.9     Mean Cell Volume: 96.3 fl (22 @ 06:27)        141  |  107  |  44<H>  ----------------------------<  228<H>  4.3   |  22  |  2.25<H>    Ca    9.0      2022 06:27  Phos  3.6       Mg     2.1         TPro  6.6  /  Alb  3.5  /  TBili  0.2  /  DBili  x   /  AST  47<H>  /  ALT  123<H>  /  AlkPhos  146<H>      LIVER FUNCTIONS - ( 2022 06:27 )  Alb: 3.5 g/dL / Pro: 6.6 g/dL / ALK PHOS: 146 U/L / ALT: 123 U/L / AST: 47 U/L / GGT: x                                   9.5    2.95  )-----------( 202      ( 2022 06:27 )             30.9                         9.6    3.14  )-----------( 181      ( 2022 07:01 )             30.3                         9.5    3.42  )-----------( 166      ( 2022 07:14 )             30.3       Imaging:

## 2022-06-27 NOTE — PROGRESS NOTE ADULT - ASSESSMENT
Pt. is a 66 y.o. M w/ PMHx of DM, HLD, obesity, HFpEF with mild LV diastolic dysfunction, MGUS, AOCD, with a history of duodenal ulcer as well as GAVE and duodenal AVM s/p APC (last on 10/11/19), decompensated JEAN/Cirrhosis (ascites/SBP/HE), multiple UTIs, history of JEAN/Cirrhosis 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 on immunosuppression with Prednisone 5mg, Everolimus 4mg and CellCept 250mg BID and CKD is admitted for fall.

## 2022-06-27 NOTE — PROGRESS NOTE ADULT - SUBJECTIVE AND OBJECTIVE BOX
Transplant Surgery - Multidisciplinary Rounds  --------------------------------------------------------------  OLT 7/2/2020    Present: Patient seen and examined with multidisciplinary Transplant team including  Surgeon: Dr. Lucio, Hepatologist: Dr. Medina, OLY Waters and unit RN during AM rounds.   Disciplines not in attendance will be notified of the plan.     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    s/p OLT 7/2/20 course c/b VRE bacteremia, L foot OM, CNI toxicity to both Tacrolimus and Cyclosporine; switched to Everolimus 7/27/20    Admitted from 12/20/21 to 1/6/22 s/p fall at home, no LOC or injuries.  - Found to be +Covid w/ CT chest findings concerning for beginnings of multifocal PNA. Received remdesivir and mononclonal antibodies and on dexamethasone (12/29-1/7).   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 PCP prophylaxis changed from bactrim to atovaquone for LFTs.   -Patient was found to have DVT on L femoral vein and started on full treatment AC, now on Eliquis 5mg BID.     ------------  Transferred to [facility] from [facility] on 6/16/2022 where he was admitted for multiple falls over 24hr period.  +HA, +neck pain, +L rib pain.  Imaging revealed:  -C5-C7 mod-severe spinal stenosis  -L rib fx  -5mm distal L ureteral stone  -also w/ diarrhea    Interval Events:  - Afebrile, Stable VSS  - WBC trending down 2.2 down from 1.7  - Rising SCr 2.2    Immunosuppression:  Everolimus 4mg BID, MMF 250mg bid (due to GI symptoms)    Potential Discharge date: pending acceptance at acute rehab  Education:  Medications  Plan of care:  See Below    MEDICATIONS  (STANDING):  dextrose 5%. 1000 milliLiter(s) (50 mL/Hr) IV Continuous <Continuous>  dextrose 5%. 1000 milliLiter(s) (100 mL/Hr) IV Continuous <Continuous>  dextrose 50% Injectable 25 Gram(s) IV Push once  dextrose 50% Injectable 12.5 Gram(s) IV Push once  dextrose 50% Injectable 25 Gram(s) IV Push once  everolimus (ZORTRESS) 4 milliGRAM(s) Oral <User Schedule>  folic acid 1 milliGRAM(s) Oral daily  glucagon  Injectable 1 milliGRAM(s) IntraMuscular once  heparin   Injectable 5000 Unit(s) SubCutaneous every 12 hours  insulin glargine Injectable (LANTUS) 14 Unit(s) SubCutaneous at bedtime  insulin lispro (ADMELOG) corrective regimen sliding scale   SubCutaneous three times a day before meals  insulin lispro (ADMELOG) corrective regimen sliding scale   SubCutaneous at bedtime  insulin lispro Injectable (ADMELOG) 10 Unit(s) SubCutaneous three times a day before meals  lidocaine   4% Patch 1 Patch Transdermal daily  lidocaine   4% Patch 1 Patch Transdermal daily  mycophenolate mofetil 250 milliGRAM(s) Oral two times a day  omega-3-Acid Ethyl Esters 1 Gram(s) Oral two times a day  pantoprazole    Tablet 40 milliGRAM(s) Oral before breakfast  polyethylene glycol 3350 17 Gram(s) Oral daily  sertraline 100 milliGRAM(s) Oral daily  tamsulosin 0.4 milliGRAM(s) Oral at bedtime    MEDICATIONS  (PRN):  artificial  tears Solution 1 Drop(s) Both EYES four times a day PRN Dry Eyes  dextrose Oral Gel 15 Gram(s) Oral once PRN Blood Glucose LESS THAN 70 milliGRAM(s)/deciliter  traMADol 25 milliGRAM(s) Oral every 12 hours PRN Severe Pain (7 - 10)      PAST MEDICAL & SURGICAL HISTORY:  Diabetes  Hypercholesteremia  Neuropathy  Hypertension  Renal stones  25 years ago  Fatty liver disease, nonalcoholic  Obesity  Hepatic encephalopathy  GERD with esophagitis Gastritis &amp; Non Bleeding Ulcers  GIB (gastrointestinal bleeding)  S/P cholecystectomy    Vital Signs Last 24 Hrs  T(C): 36.5 (27 Jun 2022 05:52), Max: 37 (26 Jun 2022 12:57)  T(F): 97.7 (27 Jun 2022 05:52), Max: 98.6 (26 Jun 2022 12:57)  HR: 71 (27 Jun 2022 05:52) (71 - 78)  BP: 105/61 (27 Jun 2022 05:52) (105/61 - 169/77)  BP(mean): --  RR: 18 (27 Jun 2022 05:52) (18 - 18)  SpO2: 96% (27 Jun 2022 05:52) (94% - 96%)    I&O's Summary    26 Jun 2022 07:01  -  27 Jun 2022 07:00  --------------------------------------------------------  IN: 720 mL / OUT: 1775 mL / NET: -1055 mL    27 Jun 2022 07:01  -  27 Jun 2022 11:36  --------------------------------------------------------  IN: 240 mL / OUT: 275 mL / NET: -35 mL                         9.5    2.95  )-----------( 202      ( 27 Jun 2022 06:27 )             30.9     06-27    141  |  107  |  44<H>  ----------------------------<  228<H>  4.3   |  22  |  2.25<H>    Ca    9.0      27 Jun 2022 06:27  Phos  3.6     06-27  Mg     2.1     06-27    TPro  6.6  /  Alb  3.5  /  TBili  0.2  /  DBili  x   /  AST  47<H>  /  ALT  123<H>  /  AlkPhos  146<H>  06-27      Review of systems  Gen: No weight changes, fatigue, fevers/chills, see hpi  Skin: No rashes  Head/Eyes/Ears/Mouth: No headache; Normal hearing; Normal vision w/o blurriness; No sinus pain/discomfort, sore throat  Respiratory: No dyspnea, cough, wheezing, hemoptysis  CV: No chest pain, PND, orthopnea  GI:  no diarrhea, constipation, nausea, vomiting, melena, hematochezia  : No increased frequency, dysuria, hematuria, nocturia  MSK: see hpi, no edema  Neuro: No dizziness/lightheadedness, weakness, seizures, numbness, tingling  Heme: No easy bruising or bleeding  Endo: No heat/cold intolerance  Psych: No significant nervousness, anxiety, stress, depression  All other systems were reviewed and are negative, except as noted.      PHYSICAL EXAM:  Constitutional: Well developed / well nourished  Eyes: anicteric, PERRLA  ENMT: nc/at, no thrush, loose front tooth  Neck: supple  Respiratory: CTA B/L  Cardiovascular: RRR.  Gastrointestinal: Soft abdomen, ND/NT. well healed incisional scar  Genitourinary: Voiding spontaneously  Extremities: SCD's in place and working bilaterally, no calf TTP. no edema. L rib pain to palpation  Vascular: Palpable dp pulses bilaterally.   Neurological: A&O x3  Skin: no rashes, ulcerations, lesions  Musculoskeletal: Moving all extremities, decreased strength throughout  Psychiatric: Responsive

## 2022-06-27 NOTE — PROGRESS NOTE ADULT - TIME BILLING
Liver transplant recipient with chronic kidney disease, up trending creatinine  Multiple kidney injury risks including DM, HLD, Obesity, Nephrolithiasis, BPH, MGUS  Noted clinical, lab, imaging data, noted proteinuria  Suggestions:  Recheck serum creatinine, check Urine protein/creatinine  Check Post void bladder volume  Renal function preservation strategy will include maintaining optimized control of blood glucose, lipid, achieving and maintaining healthy weight and managing risks for nephrolithiasis.  Re image kidneys by ultrasound in 6 weeks for resolution of hydronephrosis and if persistent urology evaluation for further work up  Will follow  I was present during and reviewed clinical and lab data as well as assessment and plan as documented by the house staff as noted. Please contact if any additional questions with any change in clinical condition or on availability of any additional information or reports. Liver transplant recipient with chronic kidney disease, up trending creatinine  Multiple kidney injury risks including DM, HLD, Obesity, Nephrolithiasis, BPH, MGUS  Noted clinical, lab, imaging data, noted proteinuria  Suggestions:  Recheck serum creatinine, check Urine protein/creatinine  Check Post void bladder volume  Renal function preservation strategy will include maintaining optimized control of blood glucose, lipid, achieving and maintaining healthy weight, avoiding nephrotoxic agents such as NSAIDs and managing risks for nephrolithiasis.  Re image kidneys by ultrasound in 6 weeks for resolution of hydronephrosis and if persistent urology evaluation for further work up  Will follow  I was present during and reviewed clinical and lab data as well as assessment and plan as documented by the house staff as noted. Please contact if any additional questions with any change in clinical condition or on availability of any additional information or reports.

## 2022-06-28 LAB
ALBUMIN SERPL ELPH-MCNC: 3.4 G/DL — SIGNIFICANT CHANGE UP (ref 3.3–5)
ALP SERPL-CCNC: 142 U/L — HIGH (ref 40–120)
ALT FLD-CCNC: 105 U/L — HIGH (ref 10–45)
ANION GAP SERPL CALC-SCNC: 12 MMOL/L — SIGNIFICANT CHANGE UP (ref 5–17)
AST SERPL-CCNC: 43 U/L — HIGH (ref 10–40)
BASOPHILS # BLD AUTO: 0.02 K/UL — SIGNIFICANT CHANGE UP (ref 0–0.2)
BASOPHILS NFR BLD AUTO: 0.7 % — SIGNIFICANT CHANGE UP (ref 0–2)
BILIRUB SERPL-MCNC: 0.2 MG/DL — SIGNIFICANT CHANGE UP (ref 0.2–1.2)
BUN SERPL-MCNC: 44 MG/DL — HIGH (ref 7–23)
CALCIUM SERPL-MCNC: 8.8 MG/DL — SIGNIFICANT CHANGE UP (ref 8.4–10.5)
CHLORIDE SERPL-SCNC: 107 MMOL/L — SIGNIFICANT CHANGE UP (ref 96–108)
CO2 SERPL-SCNC: 21 MMOL/L — LOW (ref 22–31)
CREAT SERPL-MCNC: 2 MG/DL — HIGH (ref 0.5–1.3)
EGFR: 36 ML/MIN/1.73M2 — LOW
EOSINOPHIL # BLD AUTO: 0.06 K/UL — SIGNIFICANT CHANGE UP (ref 0–0.5)
EOSINOPHIL NFR BLD AUTO: 2 % — SIGNIFICANT CHANGE UP (ref 0–6)
GLUCOSE BLDC GLUCOMTR-MCNC: 168 MG/DL — HIGH (ref 70–99)
GLUCOSE BLDC GLUCOMTR-MCNC: 190 MG/DL — HIGH (ref 70–99)
GLUCOSE BLDC GLUCOMTR-MCNC: 199 MG/DL — HIGH (ref 70–99)
GLUCOSE BLDC GLUCOMTR-MCNC: 238 MG/DL — HIGH (ref 70–99)
GLUCOSE SERPL-MCNC: 175 MG/DL — HIGH (ref 70–99)
HCT VFR BLD CALC: 29 % — LOW (ref 39–50)
HGB BLD-MCNC: 9.2 G/DL — LOW (ref 13–17)
IMM GRANULOCYTES NFR BLD AUTO: 0.3 % — SIGNIFICANT CHANGE UP (ref 0–1.5)
LYMPHOCYTES # BLD AUTO: 0.95 K/UL — LOW (ref 1–3.3)
LYMPHOCYTES # BLD AUTO: 32.4 % — SIGNIFICANT CHANGE UP (ref 13–44)
MAGNESIUM SERPL-MCNC: 2 MG/DL — SIGNIFICANT CHANGE UP (ref 1.6–2.6)
MCHC RBC-ENTMCNC: 29.5 PG — SIGNIFICANT CHANGE UP (ref 27–34)
MCHC RBC-ENTMCNC: 31.7 GM/DL — LOW (ref 32–36)
MCV RBC AUTO: 92.9 FL — SIGNIFICANT CHANGE UP (ref 80–100)
MONOCYTES # BLD AUTO: 0.44 K/UL — SIGNIFICANT CHANGE UP (ref 0–0.9)
MONOCYTES NFR BLD AUTO: 15 % — HIGH (ref 2–14)
NEUTROPHILS # BLD AUTO: 1.45 K/UL — LOW (ref 1.8–7.4)
NEUTROPHILS NFR BLD AUTO: 49.6 % — SIGNIFICANT CHANGE UP (ref 43–77)
NRBC # BLD: 0 /100 WBCS — SIGNIFICANT CHANGE UP (ref 0–0)
PHOSPHATE SERPL-MCNC: 3.4 MG/DL — SIGNIFICANT CHANGE UP (ref 2.5–4.5)
PLATELET # BLD AUTO: 207 K/UL — SIGNIFICANT CHANGE UP (ref 150–400)
POTASSIUM SERPL-MCNC: 4.3 MMOL/L — SIGNIFICANT CHANGE UP (ref 3.5–5.3)
POTASSIUM SERPL-SCNC: 4.3 MMOL/L — SIGNIFICANT CHANGE UP (ref 3.5–5.3)
PROT ?TM UR-MCNC: 122 MG/DL — HIGH (ref 0–12)
PROT SERPL-MCNC: 6.4 G/DL — SIGNIFICANT CHANGE UP (ref 6–8.3)
RBC # BLD: 3.12 M/UL — LOW (ref 4.2–5.8)
RBC # FLD: 13 % — SIGNIFICANT CHANGE UP (ref 10.3–14.5)
SODIUM SERPL-SCNC: 140 MMOL/L — SIGNIFICANT CHANGE UP (ref 135–145)
WBC # BLD: 2.93 K/UL — LOW (ref 3.8–10.5)
WBC # FLD AUTO: 2.93 K/UL — LOW (ref 3.8–10.5)

## 2022-06-28 PROCEDURE — 99232 SBSQ HOSP IP/OBS MODERATE 35: CPT | Mod: GC

## 2022-06-28 PROCEDURE — 76775 US EXAM ABDO BACK WALL LIM: CPT | Mod: 26

## 2022-06-28 PROCEDURE — 99232 SBSQ HOSP IP/OBS MODERATE 35: CPT

## 2022-06-28 RX ORDER — EVEROLIMUS 10 MG/1
4 TABLET ORAL
Refills: 0 | Status: DISCONTINUED | OUTPATIENT
Start: 2022-06-29 | End: 2022-06-29

## 2022-06-28 RX ORDER — EVEROLIMUS 10 MG/1
3 TABLET ORAL
Refills: 0 | Status: DISCONTINUED | OUTPATIENT
Start: 2022-06-28 | End: 2022-06-29

## 2022-06-28 RX ORDER — EVEROLIMUS 10 MG/1
4 TABLET ORAL ONCE
Refills: 0 | Status: COMPLETED | OUTPATIENT
Start: 2022-06-28 | End: 2022-06-28

## 2022-06-28 RX ORDER — CHLORHEXIDINE GLUCONATE 213 G/1000ML
1 SOLUTION TOPICAL
Refills: 0 | Status: DISCONTINUED | OUTPATIENT
Start: 2022-06-28 | End: 2022-06-29

## 2022-06-28 RX ADMIN — Medication 2: at 10:29

## 2022-06-28 RX ADMIN — POLYETHYLENE GLYCOL 3350 17 GRAM(S): 17 POWDER, FOR SOLUTION ORAL at 13:38

## 2022-06-28 RX ADMIN — LIDOCAINE 1 PATCH: 4 CREAM TOPICAL at 03:03

## 2022-06-28 RX ADMIN — Medication 10 UNIT(S): at 18:15

## 2022-06-28 RX ADMIN — MYCOPHENOLATE MOFETIL 250 MILLIGRAM(S): 250 CAPSULE ORAL at 18:15

## 2022-06-28 RX ADMIN — Medication 2: at 18:15

## 2022-06-28 RX ADMIN — HEPARIN SODIUM 5000 UNIT(S): 5000 INJECTION INTRAVENOUS; SUBCUTANEOUS at 05:28

## 2022-06-28 RX ADMIN — LIDOCAINE 1 PATCH: 4 CREAM TOPICAL at 13:39

## 2022-06-28 RX ADMIN — TRAMADOL HYDROCHLORIDE 25 MILLIGRAM(S): 50 TABLET ORAL at 10:49

## 2022-06-28 RX ADMIN — Medication 1000 MILLIGRAM(S): at 00:00

## 2022-06-28 RX ADMIN — Medication 1 GRAM(S): at 18:15

## 2022-06-28 RX ADMIN — LIDOCAINE 1 PATCH: 4 CREAM TOPICAL at 13:38

## 2022-06-28 RX ADMIN — EVEROLIMUS 4 MILLIGRAM(S): 10 TABLET ORAL at 10:43

## 2022-06-28 RX ADMIN — PANTOPRAZOLE SODIUM 40 MILLIGRAM(S): 20 TABLET, DELAYED RELEASE ORAL at 05:29

## 2022-06-28 RX ADMIN — TRAMADOL HYDROCHLORIDE 25 MILLIGRAM(S): 50 TABLET ORAL at 11:20

## 2022-06-28 RX ADMIN — LIDOCAINE 1 PATCH: 4 CREAM TOPICAL at 19:00

## 2022-06-28 RX ADMIN — INSULIN GLARGINE 14 UNIT(S): 100 INJECTION, SOLUTION SUBCUTANEOUS at 21:25

## 2022-06-28 RX ADMIN — SERTRALINE 100 MILLIGRAM(S): 25 TABLET, FILM COATED ORAL at 13:38

## 2022-06-28 RX ADMIN — Medication 10 UNIT(S): at 10:30

## 2022-06-28 RX ADMIN — MYCOPHENOLATE MOFETIL 250 MILLIGRAM(S): 250 CAPSULE ORAL at 05:29

## 2022-06-28 RX ADMIN — HEPARIN SODIUM 5000 UNIT(S): 5000 INJECTION INTRAVENOUS; SUBCUTANEOUS at 18:14

## 2022-06-28 RX ADMIN — Medication 1 MILLIGRAM(S): at 13:38

## 2022-06-28 RX ADMIN — Medication 1 DROP(S): at 15:01

## 2022-06-28 RX ADMIN — Medication 4: at 13:39

## 2022-06-28 RX ADMIN — EVEROLIMUS 3 MILLIGRAM(S): 10 TABLET ORAL at 20:17

## 2022-06-28 RX ADMIN — CHLORHEXIDINE GLUCONATE 1 APPLICATION(S): 213 SOLUTION TOPICAL at 13:40

## 2022-06-28 RX ADMIN — Medication 10 UNIT(S): at 13:40

## 2022-06-28 RX ADMIN — Medication 1 GRAM(S): at 05:28

## 2022-06-28 NOTE — PROGRESS NOTE ADULT - ASSESSMENT
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    s/p OLT 7/2/20 (course c/b VRE bacteremia, L Foot OM, CNI toxicity to both Tacrolimus and Cyclosporine; switched to Everolimus 7/27/20).  Recent syncope and fall, COVID all in 12/2022.  Transferred s/p fall from [facility]    s/p fall   - CT neck with mod-severe spinal stenosis with associated pain. Neuro/NeuroSx teams consulted, appreciate input  - MRI neck non-con with mod canal stenosis  - MRI lumbar completed.    - Neurosurgery: Can pursue rest of workup in outpatient setting. Can be referred to ortho spine and neurology (Dr Viera: [phone] [phone]) for EMG/NCS within 1-2 weeks after discharge. Given likely chronicity of pathology, low current clinical utility for steroids  - Continue PT/OT, pain management, fall precautions . Physiatry recommending Acute rehab. Declined by Jerry Cordero CM working on finding new facility       [] Hydronephrosis, L ureteral stone  - Appreciate  input,   - repeat renal us with no acute findings     [] s/p OLT  - LFTs stable  - Atorvastatin DCd 6/21  - SQH/SCDs  - Daily CBC, CMP, INR, Mag, Phos, Everolimus level  - watch WBC, leukopenic. Off valcyte, MMF 250bid     Immunosuppression  -continue Everolimus per level, MMF  250BID, dced pred   -PPx: off bactrim/valcyte/nystatin  - CMV +/-, Neg CMV PCR on 6/16    DM:  - glucose improving  - Continue lantus/lispro  - Continue ISS  - Diabetic diet    Dispo:  - awaiting dc to rehab, pending auth

## 2022-06-28 NOTE — PROGRESS NOTE ADULT - ASSESSMENT
66M w/ PMHx JEAN cirrhosis s/p OLT 7/2/20 (course c/b VRE bacteremia, CNI toxicity to both tacro and cyclosporine; switched to everolimus 7/27/20), HLD, IDDM, obesity, HFpEF presenting w/ fall and ureteral stone to [facility] on 6/15/22, now transferred to [facility] for further management.     Impression:   # CKD - slightly worsening creatinine - repeat kidney US: Mild left hydronephrosis. No calculus is seen in the visualized ureter,   however there is a portion of the ureter that is obscured secondary to bowel gas which correlates with the location of the renal stone seen on CT abdomen pelvis 6/15/2022.    #Fall, rib fracture: now second admission for falls c/b rib fracture. Per neurologic evaluation possibly 2/2 spinal canal stenosis   #Ureteral stone c/b hydronephrosis, stable/improved  #Elevated liver enzymes: liver biopsy 12/30/21 without e/o rejection, found to have NRH  #JEAN cirrhosis s/p LT  #CMV viremia   #DM  #Anemia    Recommendations:  - f/u urology/nephrology recs  - appreciate neuro evaluation re: recurrent falls, stating likely 2/2 spinal canal stenosis / cervical myelopathy  (NSGY with no plans for surgical intervention) vs may need EMG evaluation as an outpatient  - immunosuppression: everolimus 4/3 mg BID (dose by level)  - remains off valcyte per ID   - will need outpatient neurology follow up   - appreciate dispo planning; awaiting SANTANA        Thank you for involving us in the care of this patient, please reach out if any further questions.     Ky Ram MD  Gastroenterology/Hepatology Fellow, PGY5    Available on [employer] Teams  [phone] ([facility])  [phone] ([facility])  Please contact on call fellow weekdays after 5pm-7am and weekends: [phone]

## 2022-06-28 NOTE — PROGRESS NOTE ADULT - SUBJECTIVE AND OBJECTIVE BOX
[facility] DIVISION OF KIDNEY DISEASES AND HYPERTENSION -- FOLLOW UP NOTE  --------------------------------------------------------------------------------  HPI:  Pt. is a 66 y.o. M w/ PMHx of DM, HLD, obesity, HFpEF with mild LV diastolic dysfunction, MGUS, AOCD, with a history of duodenal ulcer as well as GAVE and duodenal AVM s/p APC (last on 10/11/19), decompensated JEAN/Cirrhosis (ascites/SBP/HE), multiple UTIs, history of JEAN/Cirrhosis 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 on immunosuppression with Prednisone 5mg, Everolimus 4mg and CellCept 250mg BID and CKD is admitted for fall. Pt. denies any lightheadedness, dizziness, chest pain or issues passing urine. Pt. noted to have positive orthostatic VS however asymptomatic today.     Pt. seen this AM. No acute complaints. SCr. now improving. UOP adequate.     PAST HISTORY  --------------------------------------------------------------------------------  No significant changes to PMH, PSH, FHx, SHx, unless otherwise noted    ALLERGIES & MEDICATIONS  --------------------------------------------------------------------------------  Allergies    codeine (Anaphylaxis)    Intolerances      Standing Inpatient Medications  chlorhexidine 2% Cloths 1 Application(s) Topical <User Schedule>  dextrose 5%. 1000 milliLiter(s) IV Continuous <Continuous>  dextrose 5%. 1000 milliLiter(s) IV Continuous <Continuous>  dextrose 50% Injectable 25 Gram(s) IV Push once  dextrose 50% Injectable 12.5 Gram(s) IV Push once  dextrose 50% Injectable 25 Gram(s) IV Push once  everolimus (ZORTRESS) 3 milliGRAM(s) Oral <User Schedule>  folic acid 1 milliGRAM(s) Oral daily  glucagon  Injectable 1 milliGRAM(s) IntraMuscular once  heparin   Injectable 5000 Unit(s) SubCutaneous every 12 hours  insulin glargine Injectable (LANTUS) 14 Unit(s) SubCutaneous at bedtime  insulin lispro (ADMELOG) corrective regimen sliding scale   SubCutaneous three times a day before meals  insulin lispro (ADMELOG) corrective regimen sliding scale   SubCutaneous at bedtime  insulin lispro Injectable (ADMELOG) 10 Unit(s) SubCutaneous three times a day before meals  lidocaine   4% Patch 1 Patch Transdermal daily  lidocaine   4% Patch 1 Patch Transdermal daily  mycophenolate mofetil 250 milliGRAM(s) Oral two times a day  omega-3-Acid Ethyl Esters 1 Gram(s) Oral two times a day  pantoprazole    Tablet 40 milliGRAM(s) Oral before breakfast  polyethylene glycol 3350 17 Gram(s) Oral daily  sertraline 100 milliGRAM(s) Oral daily  tamsulosin 0.4 milliGRAM(s) Oral at bedtime    PRN Inpatient Medications  artificial  tears Solution 1 Drop(s) Both EYES four times a day PRN  dextrose Oral Gel 15 Gram(s) Oral once PRN  traMADol 25 milliGRAM(s) Oral every 12 hours PRN      REVIEW OF SYSTEMS  --------------------------------------------------------------------------------  Gen: No fevers/chills  Respiratory: No dyspnea, cough,   CV: No chest pain, PND, orthopnea  GI: No abdominal pain, diarrhea, constipation, nausea, vomiting  Transplant: No pain  : No increased frequency, dysuria, hematuria   MSK: No edema  Neuro: No dizziness/lightheadedness    All other systems were reviewed and are negative, except as noted.    VITALS/PHYSICAL EXAM  --------------------------------------------------------------------------------  T(C): 36.5 (06-28-22 @ 13:00), Max: 36.8 (06-27-22 @ 21:47)  HR: 69 (06-28-22 @ 13:00) (69 - 73)  BP: 149/70 (06-28-22 @ 13:00) (127/69 - 149/70)  RR: 18 (06-28-22 @ 13:00) (18 - 18)  SpO2: 97% (06-28-22 @ 13:00) (97% - 98%)  Wt(kg): --  Height (cm): 185.4 (06-27-22 @ 11:19)      06-27-22 @ 07:01  -  06-28-22 @ 07:00  --------------------------------------------------------  IN: 940 mL / OUT: 1965 mL / NET: -1025 mL        Physical Exam:  	Gen: Not in distress, well-appearing  	HEENT: no scleral icterus, moist oral mucosa. No thrush. Supple neck, no JVD  	Pulm: normal respiratory effort, lungs clear to auscultation bilaterally   	CV: regular rate and rhythm, S1 and S2 normal, no murmur   	Abd: normoactive bowel sounds, soft and non distended abdomen. No tenderness, guarding or rigidity                    Transplant non tender, no bruit  	: No suprapubic tenderness          Back: No spinal or CVA tenderness; no pre sacral edema          Extremities: No edema. Distal pulses 2+ bilaterally           Skin: warm no rash, no cyanosis   	Neuro: Alert and oriented to person, place and time. Normal speech. Normal affect.     LABS/STUDIES  --------------------------------------------------------------------------------              9.2    2.93  >-----------<  207      [06-28-22 @ 07:03]              29.0     140  |  107  |  44  ----------------------------<  175      [06-28-22 @ 07:06]  4.3   |  21  |  2.00        Ca     8.8     [06-28-22 @ 07:06]      Mg     2.0     [06-28-22 @ 07:06]      Phos  3.4     [06-28-22 @ 07:06]    TPro  6.4  /  Alb  3.4  /  TBili  0.2  /  DBili  x   /  AST  43  /  ALT  105  /  AlkPhos  142  [06-28-22 @ 07:06]    Creatinine Trend:  SCr 2.00 [06-28 @ 07:06]  SCr 2.25 [06-27 @ 06:27]  SCr 1.77 [06-26 @ 07:00]  SCr 1.95 [06-25 @ 07:07]  SCr 1.98 [06-24 @ 06:20]    Urinalysis - [06-15-22 @ 18:55]      Color Yellow / Appearance Slightly Turbid / SG 1.020 / pH 5.0      Gluc 1000 / Ketone Negative  / Bili Negative / Urobili Negative       Blood Large / Protein 100 / Leuk Est Moderate / Nitrite Negative      RBC 11-25 / WBC 26-50 / Hyaline  / Gran  / Sq Epi  / Non Sq Epi Occasional / Bacteria Few    Urine Protein 122      [06-27-22 @ 19:03]    Iron 27, TIBC 121, %sat 22      [12-23-21 @ 08:43]  Ferritin 702      [01-04-22 @ 09:14]  Vitamin D (25OH) 21.4      [06-19-22 @ 12:19]  HbA1c 6.4      [02-12-20 @ 17:08]  TSH 1.45      [06-19-22 @ 12:19]      Syphilis Screen (Treponema Pallidum Ab) Negative      [06-19-22 @ 12:19]

## 2022-06-28 NOTE — PROGRESS NOTE ADULT - SUBJECTIVE AND OBJECTIVE BOX
Interval Events:   - No acute events  - Awaiting SANTANA    Allergies:  codeine (Anaphylaxis)        Hospital Medications:  artificial  tears Solution 1 Drop(s) Both EYES four times a day PRN  dextrose 5%. 1000 milliLiter(s) IV Continuous <Continuous>  dextrose 5%. 1000 milliLiter(s) IV Continuous <Continuous>  dextrose 50% Injectable 25 Gram(s) IV Push once  dextrose 50% Injectable 12.5 Gram(s) IV Push once  dextrose 50% Injectable 25 Gram(s) IV Push once  dextrose Oral Gel 15 Gram(s) Oral once PRN  everolimus (ZORTRESS) 4 milliGRAM(s) Oral <User Schedule>  folic acid 1 milliGRAM(s) Oral daily  glucagon  Injectable 1 milliGRAM(s) IntraMuscular once  heparin   Injectable 5000 Unit(s) SubCutaneous every 12 hours  insulin glargine Injectable (LANTUS) 14 Unit(s) SubCutaneous at bedtime  insulin lispro (ADMELOG) corrective regimen sliding scale   SubCutaneous three times a day before meals  insulin lispro (ADMELOG) corrective regimen sliding scale   SubCutaneous at bedtime  insulin lispro Injectable (ADMELOG) 10 Unit(s) SubCutaneous three times a day before meals  lidocaine   4% Patch 1 Patch Transdermal daily  lidocaine   4% Patch 1 Patch Transdermal daily  mycophenolate mofetil 250 milliGRAM(s) Oral two times a day  omega-3-Acid Ethyl Esters 1 Gram(s) Oral two times a day  pantoprazole    Tablet 40 milliGRAM(s) Oral before breakfast  polyethylene glycol 3350 17 Gram(s) Oral daily  sertraline 100 milliGRAM(s) Oral daily  tamsulosin 0.4 milliGRAM(s) Oral at bedtime  traMADol 25 milliGRAM(s) Oral every 12 hours PRN      PMHX/PSHX:  Diabetes    Hypercholesteremia    Neuropathy    Hypertension    Renal stones    Fatty liver disease, nonalcoholic    Obesity    Hepatic encephalopathy    GERD with esophagitis    GIB (gastrointestinal bleeding)    No significant past surgical history    S/P cholecystectomy        Family history:  No pertinent family history in first degree relatives    Family history of stomach cancer    Family history of hypertension    Family history of type 2 diabetes mellitus        ROS: As per HPI, otherwise 14-point ROS reviewed and negative.      PHYSICAL EXAM:   Vital Signs Last 24 Hrs  T(C): 36.8 (28 Jun 2022 05:00), Max: 36.8 (27 Jun 2022 21:47)  T(F): 98.2 (28 Jun 2022 05:00), Max: 98.2 (27 Jun 2022 21:47)  HR: 73 (28 Jun 2022 05:00) (67 - 73)  BP: 127/69 (28 Jun 2022 05:00) (127/69 - 144/74)  BP(mean): --  RR: 18 (28 Jun 2022 05:00) (18 - 18)  SpO2: 97% (28 Jun 2022 05:00) (97% - 98%)    GENERAL:  NAD, Appears stated age  HEENT:  NC/AT,  conjunctivae clear and pink, sclera -anicteric  CHEST:  Normal Effort, Breath sounds clear  HEART:  RRR, S1 + S2, no murmurs  ABDOMEN:  Soft, non-tender, non-distended, normoactive bowel sounds,  no masses  EXTREMITIES:  no cyanosis or edema  SKIN:  Warm & Dry. No rash or erythema  NEURO:  Alert, oriented, no focal deficit    LABS:                        9.2    2.93  )-----------( 207      ( 28 Jun 2022 07:03 )             29.0     06-28    140  |  107  |  44<H>  ----------------------------<  175<H>  4.3   |  21<L>  |  2.00<H>    Ca    8.8      28 Jun 2022 07:06  Phos  3.4     06-28  Mg     2.0     06-28    TPro  6.4  /  Alb  3.4  /  TBili  0.2  /  DBili  x   /  AST  43<H>  /  ALT  105<H>  /  AlkPhos  142<H>  06-28    LIVER FUNCTIONS - ( 28 Jun 2022 07:06 )  Alb: 3.4 g/dL / Pro: 6.4 g/dL / ALK PHOS: 142 U/L / ALT: 105 U/L / AST: 43 U/L / GGT: x                             Imaging:

## 2022-06-28 NOTE — PROGRESS NOTE ADULT - SUBJECTIVE AND OBJECTIVE BOX
INTERVAL HPI/OVERNIGHT EVENTS:  Events noted     MEDICATIONS  (STANDING):  atorvastatin 80 milliGRAM(s) Oral at bedtime  dextrose 5%. 1000 milliLiter(s) (100 mL/Hr) IV Continuous <Continuous>  dextrose 5%. 1000 milliLiter(s) (50 mL/Hr) IV Continuous <Continuous>  dextrose 50% Injectable 25 Gram(s) IV Push once  dextrose 50% Injectable 12.5 Gram(s) IV Push once  dextrose 50% Injectable 25 Gram(s) IV Push once  everolimus (ZORTRESS) 4 milliGRAM(s) Oral <User Schedule>  folic acid 1 milliGRAM(s) Oral daily  glucagon  Injectable 1 milliGRAM(s) IntraMuscular once  heparin   Injectable 5000 Unit(s) SubCutaneous every 12 hours  insulin glargine Injectable (LANTUS) 8 Unit(s) SubCutaneous at bedtime  insulin lispro (ADMELOG) corrective regimen sliding scale   SubCutaneous three times a day before meals  insulin lispro (ADMELOG) corrective regimen sliding scale   SubCutaneous at bedtime  insulin lispro Injectable (ADMELOG) 6 Unit(s) SubCutaneous three times a day before meals  lidocaine   4% Patch 1 Patch Transdermal daily  lidocaine   4% Patch 1 Patch Transdermal daily  mycophenolate mofetil 250 milliGRAM(s) Oral two times a day  omega-3-Acid Ethyl Esters 1 Gram(s) Oral two times a day  pantoprazole    Tablet 40 milliGRAM(s) Oral before breakfast  polyethylene glycol 3350 17 Gram(s) Oral once  predniSONE   Tablet 5 milliGRAM(s) Oral daily  sertraline 100 milliGRAM(s) Oral daily  sodium chloride 0.9%. 1000 milliLiter(s) (50 mL/Hr) IV Continuous <Continuous>  tamsulosin 0.4 milliGRAM(s) Oral at bedtime    MEDICATIONS  (PRN):  dextrose Oral Gel 15 Gram(s) Oral once PRN Blood Glucose LESS THAN 70 milliGRAM(s)/deciliter  traMADol 25 milliGRAM(s) Oral every 12 hours PRN Severe Pain (7 - 10)      Allergies    codeine (Anaphylaxis)    Intolerances        ROS:   General:  No wt loss, fevers, chills, night sweats, fatigue,   Eyes:  Good vision, no reported pain  ENT:  No sore throat, pain, runny nose, dysphagia  CV:  No pain, palpitations, hypo/hypertension  Resp:  No dyspnea, cough, tachypnea, wheezing  GI:  No pain, No nausea, No vomiting, No diarrhea, No constipation, No weight loss, No fever, No pruritis, No rectal bleeding, No tarry stools, No dysphagia,  :  No pain, bleeding, incontinence, nocturia  Muscle:  No pain, weakness  Neuro:  No weakness, tingling, memory problems  Psych:  No fatigue, insomnia, mood problems, depression  Endocrine:  No polyuria, polydipsia, cold/heat intolerance  Heme:  No petechiae, ecchymosis, easy bruisability  Skin:  No rash, tattoos, scars, edema      PHYSICAL EXAM:   Vital Signs Last 24 Hrs  T(C): 36.8 (2022 05:00), Max: 36.8 (2022 21:47)  T(F): 98.2 (2022 05:00), Max: 98.2 (2022 21:47)  HR: 73 (2022 05:00) (67 - 73)  BP: 127/69 (2022 05:00) (127/69 - 144/74)  BP(mean): --  RR: 18 (2022 05:00) (18 - 18)  SpO2: 97% (2022 05:00) (97% - 98%)  Daily     Daily Weight in k.5 (2022 21:06)    GENERAL:  Appears stated age,   HEENT:  NC/AT,    CHEST:  Full & symmetric excursion,   HEART:  Regular rhythm,  ABDOMEN:  Soft, non-tender, non-distended,  EXTEREMITIES:  no cyanosis  SKIN:  No rash  NEURO:  Alert,       LABS:                                   9.2    2.93  )-----------( 207      ( 2022 07:03 )             29.0   06-    140  |  107  |  44<H>  ----------------------------<  175<H>  4.3   |  21<L>  |  2.00<H>    Ca    8.8      2022 07:06  Phos  3.4     06-  Mg     2.0     -    TPro  6.4  /  Alb  3.4  /  TBili  0.2  /  DBili  x   /  AST  43<H>  /  ALT  105<H>  /  AlkPhos  142<H>              RADIOLOGY & ADDITIONAL TESTS:

## 2022-06-28 NOTE — PROGRESS NOTE ADULT - TIME BILLING
Liver transplant recipient with chronic kidney disease, up trending creatinine  Multiple kidney injury risks including DM, HLD, Obesity, Nephrolithiasis, BPH, MGUS  Noted clinical, lab, imaging data, noted proteinuria  Downtrending creatinine  Suggestions:  Periodic monitoring of renal function, blood chemistry  Renal function preservation strategy will include maintaining optimized control of blood glucose, lipid, achieving and maintaining healthy weight, avoiding nephrotoxic agents such as NSAIDs and managing risks for nephrolithiasis.  Re image kidneys by ultrasound in 6 weeks for resolution of hydronephrosis and if persistent urology evaluation for further work up  Will follow  I was present during and reviewed clinical and lab data as well as assessment and plan as documented by the house staff as noted. Please contact if any additional questions with any change in clinical condition or on availability of any additional information or reports. Liver transplant recipient with chronic kidney disease elevated creatinine  Multiple kidney injury risks including DM, HLD, Obesity, Nephrolithiasis, BPH, MGUS  Noted clinical, lab, imaging data, noted proteinuria  Downtrending creatinine noted  Suggestions:  Periodic monitoring of renal function, blood chemistry  Renal function preservation strategy will include maintaining optimized control of blood glucose, lipid, achieving and maintaining healthy weight, avoiding nephrotoxic agents such as NSAIDs and managing risks for nephrolithiasis.  Re image kidneys by ultrasound in 6 weeks for resolution of hydronephrosis and if persistent urology evaluation for further work up  Will follow  I was present during and reviewed clinical and lab data as well as assessment and plan as documented by the house staff as noted. Please contact if any additional questions with any change in clinical condition or on availability of any additional information or reports.

## 2022-06-28 NOTE — PROGRESS NOTE ADULT - ASSESSMENT
·	anemia   ·	Hx GIB  ·	decompensated JEAN/Cirrhosis s/p liver transplant   ·	ureteral calculus    diarrhea resolved  no signs of GI bleeding  CBC daily  diet as tolerated  lfts improving   ct noted   urology following   care per transplant services appreciated   dc planing as per primary, awaiting rehab   dw pt

## 2022-06-28 NOTE — PROGRESS NOTE ADULT - PROBLEM SELECTOR PLAN 1
Pt. admitted w/ SCr. of 2.2. (Baseline Cr. 1.8-2.0). SCr. trended back to baseline range and then trended to 2.0 today.  Last Renal US (6/17) showing mild L. hydronephrosis. Would repeat Renal US in 6 weeks. Obtain Protein Cr. ratio and f/u urology consult. Increase in creatinine possibly due to relative hypotension as Pt. with episode of BP in 100/60 when Pt. with higher blood pressures chronically on 6/27. Pt. asymptomatic with positive orthostatic VS.     If you have any questions, please feel free to contact me  Surendra Boyer  Nephrology Fellow  [phone]; Prefer [employer] TEAMS  (After 5pm or on weekends please page the on-call fellow)
Pt. admitted w/ SCr. of 2.2. (Baseline Cr. 1.8-2.0). SCr. trended back to baseline range and then went up to 2.2 today.  Last Renal US(6/17) showing mild L. hydronephrosis. Would repeat Renal US. Obtain Protein Cr. ratio and f/u urology consult. Increase in creatinine possibly due to relative hypotension as Pt. with episode of BP in 100/60 when Pt. with higher blood pressures chronically. Pt. asymptomatic with positive orthostatic VS.     If you have any questions, please feel free to contact me  Surendra Boyer  Nephrology Fellow  [phone]; Prefer [employer] TEAMS  (After 5pm or on weekends please page the on-call fellow)

## 2022-06-28 NOTE — PROGRESS NOTE ADULT - SUBJECTIVE AND OBJECTIVE BOX
Transplant Surgery - Multidisciplinary Rounds  --------------------------------------------------------------  OLT 7/2/2020    Present: Patient seen and examined with multidisciplinary Transplant team including  Surgeon: Dr. Lucio, Hepatologist: Dr. Medina, OLY Waters and unit RN during AM rounds.   Disciplines not in attendance will be notified of the plan.     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    s/p OLT 7/2/20 course c/b VRE bacteremia, L foot OM, CNI toxicity to both Tacrolimus and Cyclosporine; switched to Everolimus 7/27/20    Admitted from 12/20/21 to 1/6/22 s/p fall at home, no LOC or injuries.  - Found to be +Covid w/ CT chest findings concerning for beginnings of multifocal PNA. Received remdesivir and mononclonal antibodies and on dexamethasone (12/29-1/7).   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 PCP prophylaxis changed from bactrim to atovaquone for LFTs.   -Patient was found to have DVT on L femoral vein and started on full treatment AC, now on Eliquis 5mg BID.     ------------  Transferred to [facility] from [facility] on 6/16/2022 where he was admitted for multiple falls over 24hr period.  +HA, +neck pain, +L rib pain.  Imaging revealed:  -C5-C7 mod-severe spinal stenosis  -L rib fx  -5mm distal L ureteral stone  -also w/ diarrhea    Interval Events:  - Afebrile, Stable VSS  - no overnight events   - renal us with no acute findings     Immunosuppression:  Everolimus 4mg BID, MMF 250mg bid (due to GI symptoms)    Potential Discharge date: pending acceptance at acute rehab  Education:  Medications  Plan of care:  See Below    MEDICATIONS  (STANDING):  chlorhexidine 2% Cloths 1 Application(s) Topical <User Schedule>  dextrose 5%. 1000 milliLiter(s) (100 mL/Hr) IV Continuous <Continuous>  dextrose 5%. 1000 milliLiter(s) (50 mL/Hr) IV Continuous <Continuous>  dextrose 50% Injectable 25 Gram(s) IV Push once  dextrose 50% Injectable 12.5 Gram(s) IV Push once  dextrose 50% Injectable 25 Gram(s) IV Push once  everolimus (ZORTRESS) 3 milliGRAM(s) Oral <User Schedule>  folic acid 1 milliGRAM(s) Oral daily  glucagon  Injectable 1 milliGRAM(s) IntraMuscular once  heparin   Injectable 5000 Unit(s) SubCutaneous every 12 hours  insulin glargine Injectable (LANTUS) 14 Unit(s) SubCutaneous at bedtime  insulin lispro (ADMELOG) corrective regimen sliding scale   SubCutaneous three times a day before meals  insulin lispro (ADMELOG) corrective regimen sliding scale   SubCutaneous at bedtime  insulin lispro Injectable (ADMELOG) 10 Unit(s) SubCutaneous three times a day before meals  lidocaine   4% Patch 1 Patch Transdermal daily  lidocaine   4% Patch 1 Patch Transdermal daily  mycophenolate mofetil 250 milliGRAM(s) Oral two times a day  omega-3-Acid Ethyl Esters 1 Gram(s) Oral two times a day  pantoprazole    Tablet 40 milliGRAM(s) Oral before breakfast  polyethylene glycol 3350 17 Gram(s) Oral daily  sertraline 100 milliGRAM(s) Oral daily  tamsulosin 0.4 milliGRAM(s) Oral at bedtime    MEDICATIONS  (PRN):  artificial  tears Solution 1 Drop(s) Both EYES four times a day PRN Dry Eyes  dextrose Oral Gel 15 Gram(s) Oral once PRN Blood Glucose LESS THAN 70 milliGRAM(s)/deciliter  traMADol 25 milliGRAM(s) Oral every 12 hours PRN Severe Pain (7 - 10)    PAST MEDICAL & SURGICAL HISTORY:  Diabetes  Hypercholesteremia  Neuropathy  Hypertension  Renal stones 25 years ago  Fatty liver disease, nonalcoholic  Obesity  Hepatic encephalopathy  GERD with esophagitis  GIB (gastrointestinal bleeding)  S/P cholecystectomy    Vital Signs Last 24 Hrs  T(C): 36.8 (28 Jun 2022 05:00), Max: 36.8 (27 Jun 2022 21:47)  T(F): 98.2 (28 Jun 2022 05:00), Max: 98.2 (27 Jun 2022 21:47)  HR: 73 (28 Jun 2022 05:00) (67 - 73)  BP: 127/69 (28 Jun 2022 05:00) (127/69 - 144/74)  BP(mean): --  RR: 18 (28 Jun 2022 05:00) (18 - 18)  SpO2: 97% (28 Jun 2022 05:00) (97% - 98%)    I&O's Summary    27 Jun 2022 07:01  -  28 Jun 2022 07:00  --------------------------------------------------------  IN: 940 mL / OUT: 1965 mL / NET: -1025 mL                         9.2    2.93  )-----------( 207      ( 28 Jun 2022 07:03 )             29.0     06-28    140  |  107  |  44<H>  ----------------------------<  175<H>  4.3   |  21<L>  |  2.00<H>    Ca    8.8      28 Jun 2022 07:06  Phos  3.4     06-28  Mg     2.0     06-28    TPro  6.4  /  Alb  3.4  /  TBili  0.2  /  DBili  x   /  AST  43<H>  /  ALT  105<H>  /  AlkPhos  142<H>  06-28    Review of systems  Gen: No weight changes, fatigue, fevers/chills, see hpi  Skin: No rashes  Head/Eyes/Ears/Mouth: No headache; Normal hearing; Normal vision w/o blurriness; No sinus pain/discomfort, sore throat  Respiratory: No dyspnea, cough, wheezing, hemoptysis  CV: No chest pain, PND, orthopnea  GI:  no diarrhea, constipation, nausea, vomiting, melena, hematochezia  : No increased frequency, dysuria, hematuria, nocturia  MSK: see hpi, no edema  Neuro: No dizziness/lightheadedness, weakness, seizures, numbness, tingling  Heme: No easy bruising or bleeding  Endo: No heat/cold intolerance  Psych: No significant nervousness, anxiety, stress, depression  All other systems were reviewed and are negative, except as noted.      PHYSICAL EXAM:  Constitutional: Well developed / well nourished  Eyes: anicteric, PERRLA  ENMT: nc/at, no thrush, loose front tooth  Neck: supple  Respiratory: CTA B/L  Cardiovascular: RRR.  Gastrointestinal: Soft abdomen, ND/NT. well healed incisional scar  Genitourinary: Voiding spontaneously  Extremities: SCD's in place and working bilaterally, no calf TTP. no edema. L rib pain to palpation  Vascular: Palpable dp pulses bilaterally.   Neurological: A&O x3  Skin: no rashes, ulcerations, lesions  Musculoskeletal: Moving all extremities, decreased strength throughout  Psychiatric: Responsive

## 2022-06-29 ENCOUNTER — TRANSCRIPTION ENCOUNTER (OUTPATIENT)
Age: 67
End: 2022-06-29

## 2022-06-29 VITALS
HEART RATE: 86 BPM | RESPIRATION RATE: 18 BRPM | TEMPERATURE: 99 F | DIASTOLIC BLOOD PRESSURE: 66 MMHG | OXYGEN SATURATION: 96 % | SYSTOLIC BLOOD PRESSURE: 133 MMHG

## 2022-06-29 LAB
ALBUMIN SERPL ELPH-MCNC: 3.3 G/DL — SIGNIFICANT CHANGE UP (ref 3.3–5)
ALP SERPL-CCNC: 144 U/L — HIGH (ref 40–120)
ALT FLD-CCNC: 86 U/L — HIGH (ref 10–45)
ANION GAP SERPL CALC-SCNC: 10 MMOL/L — SIGNIFICANT CHANGE UP (ref 5–17)
AST SERPL-CCNC: 31 U/L — SIGNIFICANT CHANGE UP (ref 10–40)
BASOPHILS # BLD AUTO: 0.04 K/UL — SIGNIFICANT CHANGE UP (ref 0–0.2)
BASOPHILS NFR BLD AUTO: 1.1 % — SIGNIFICANT CHANGE UP (ref 0–2)
BILIRUB SERPL-MCNC: 0.2 MG/DL — SIGNIFICANT CHANGE UP (ref 0.2–1.2)
BUN SERPL-MCNC: 45 MG/DL — HIGH (ref 7–23)
CALCIUM SERPL-MCNC: 9 MG/DL — SIGNIFICANT CHANGE UP (ref 8.4–10.5)
CHLORIDE SERPL-SCNC: 108 MMOL/L — SIGNIFICANT CHANGE UP (ref 96–108)
CMV DNA CSF QL NAA+PROBE: SIGNIFICANT CHANGE UP
CMV DNA SPEC NAA+PROBE-LOG#: ABNORMAL LOG10IU/ML
CO2 SERPL-SCNC: 21 MMOL/L — LOW (ref 22–31)
CREAT SERPL-MCNC: 2.22 MG/DL — HIGH (ref 0.5–1.3)
EGFR: 32 ML/MIN/1.73M2 — LOW
EOSINOPHIL # BLD AUTO: 0.06 K/UL — SIGNIFICANT CHANGE UP (ref 0–0.5)
EOSINOPHIL NFR BLD AUTO: 1.7 % — SIGNIFICANT CHANGE UP (ref 0–6)
EVEROLIMUS, WHOLE BLOOD RESULT: 7.1 NG/ML — SIGNIFICANT CHANGE UP (ref 3–8)
EVEROLIMUS, WHOLE BLOOD RESULT: 7.9 NG/ML — SIGNIFICANT CHANGE UP (ref 3–8)
GLUCOSE BLDC GLUCOMTR-MCNC: 152 MG/DL — HIGH (ref 70–99)
GLUCOSE BLDC GLUCOMTR-MCNC: 164 MG/DL — HIGH (ref 70–99)
GLUCOSE BLDC GLUCOMTR-MCNC: 98 MG/DL — SIGNIFICANT CHANGE UP (ref 70–99)
GLUCOSE SERPL-MCNC: 165 MG/DL — HIGH (ref 70–99)
HCT VFR BLD CALC: 30.2 % — LOW (ref 39–50)
HGB BLD-MCNC: 9.5 G/DL — LOW (ref 13–17)
IMM GRANULOCYTES NFR BLD AUTO: 0.3 % — SIGNIFICANT CHANGE UP (ref 0–1.5)
LYMPHOCYTES # BLD AUTO: 1 K/UL — SIGNIFICANT CHANGE UP (ref 1–3.3)
LYMPHOCYTES # BLD AUTO: 28.1 % — SIGNIFICANT CHANGE UP (ref 13–44)
MAGNESIUM SERPL-MCNC: 2 MG/DL — SIGNIFICANT CHANGE UP (ref 1.6–2.6)
MCHC RBC-ENTMCNC: 29.1 PG — SIGNIFICANT CHANGE UP (ref 27–34)
MCHC RBC-ENTMCNC: 31.5 GM/DL — LOW (ref 32–36)
MCV RBC AUTO: 92.6 FL — SIGNIFICANT CHANGE UP (ref 80–100)
MONOCYTES # BLD AUTO: 0.5 K/UL — SIGNIFICANT CHANGE UP (ref 0–0.9)
MONOCYTES NFR BLD AUTO: 14 % — SIGNIFICANT CHANGE UP (ref 2–14)
MRSA PCR RESULT.: SIGNIFICANT CHANGE UP
NEUTROPHILS # BLD AUTO: 1.95 K/UL — SIGNIFICANT CHANGE UP (ref 1.8–7.4)
NEUTROPHILS NFR BLD AUTO: 54.8 % — SIGNIFICANT CHANGE UP (ref 43–77)
NRBC # BLD: 0 /100 WBCS — SIGNIFICANT CHANGE UP (ref 0–0)
PHOSPHATE SERPL-MCNC: 3.5 MG/DL — SIGNIFICANT CHANGE UP (ref 2.5–4.5)
PLATELET # BLD AUTO: 204 K/UL — SIGNIFICANT CHANGE UP (ref 150–400)
POTASSIUM SERPL-MCNC: 4.6 MMOL/L — SIGNIFICANT CHANGE UP (ref 3.5–5.3)
POTASSIUM SERPL-SCNC: 4.6 MMOL/L — SIGNIFICANT CHANGE UP (ref 3.5–5.3)
PROT SERPL-MCNC: 6.7 G/DL — SIGNIFICANT CHANGE UP (ref 6–8.3)
RBC # BLD: 3.26 M/UL — LOW (ref 4.2–5.8)
RBC # FLD: 12.9 % — SIGNIFICANT CHANGE UP (ref 10.3–14.5)
S AUREUS DNA NOSE QL NAA+PROBE: SIGNIFICANT CHANGE UP
SARS-COV-2 RNA SPEC QL NAA+PROBE: SIGNIFICANT CHANGE UP
SODIUM SERPL-SCNC: 139 MMOL/L — SIGNIFICANT CHANGE UP (ref 135–145)
WBC # BLD: 3.56 K/UL — LOW (ref 3.8–10.5)
WBC # FLD AUTO: 3.56 K/UL — LOW (ref 3.8–10.5)

## 2022-06-29 PROCEDURE — 86780 TREPONEMA PALLIDUM: CPT

## 2022-06-29 PROCEDURE — U0003: CPT

## 2022-06-29 PROCEDURE — 84156 ASSAY OF PROTEIN URINE: CPT

## 2022-06-29 PROCEDURE — 80053 COMPREHEN METABOLIC PANEL: CPT

## 2022-06-29 PROCEDURE — 97530 THERAPEUTIC ACTIVITIES: CPT

## 2022-06-29 PROCEDURE — 84100 ASSAY OF PHOSPHORUS: CPT

## 2022-06-29 PROCEDURE — 36415 COLL VENOUS BLD VENIPUNCTURE: CPT

## 2022-06-29 PROCEDURE — 72141 MRI NECK SPINE W/O DYE: CPT

## 2022-06-29 PROCEDURE — 76775 US EXAM ABDO BACK WALL LIM: CPT

## 2022-06-29 PROCEDURE — 86140 C-REACTIVE PROTEIN: CPT

## 2022-06-29 PROCEDURE — 87086 URINE CULTURE/COLONY COUNT: CPT

## 2022-06-29 PROCEDURE — 82525 ASSAY OF COPPER: CPT

## 2022-06-29 PROCEDURE — 72148 MRI LUMBAR SPINE W/O DYE: CPT

## 2022-06-29 PROCEDURE — 97162 PT EVAL MOD COMPLEX 30 MIN: CPT

## 2022-06-29 PROCEDURE — 80169 DRUG ASSAY EVEROLIMUS: CPT

## 2022-06-29 PROCEDURE — 84443 ASSAY THYROID STIM HORMONE: CPT

## 2022-06-29 PROCEDURE — 85025 COMPLETE CBC W/AUTO DIFF WBC: CPT

## 2022-06-29 PROCEDURE — 86593 SYPHILIS TEST NON-TREP QUANT: CPT

## 2022-06-29 PROCEDURE — 76770 US EXAM ABDO BACK WALL COMP: CPT

## 2022-06-29 PROCEDURE — 83735 ASSAY OF MAGNESIUM: CPT

## 2022-06-29 PROCEDURE — 85652 RBC SED RATE AUTOMATED: CPT

## 2022-06-29 PROCEDURE — 82550 ASSAY OF CK (CPK): CPT

## 2022-06-29 PROCEDURE — 97116 GAIT TRAINING THERAPY: CPT

## 2022-06-29 PROCEDURE — 84425 ASSAY OF VITAMIN B-1: CPT

## 2022-06-29 PROCEDURE — 82306 VITAMIN D 25 HYDROXY: CPT

## 2022-06-29 PROCEDURE — 87040 BLOOD CULTURE FOR BACTERIA: CPT

## 2022-06-29 PROCEDURE — U0005: CPT

## 2022-06-29 PROCEDURE — 97166 OT EVAL MOD COMPLEX 45 MIN: CPT

## 2022-06-29 PROCEDURE — 85730 THROMBOPLASTIN TIME PARTIAL: CPT

## 2022-06-29 PROCEDURE — 87640 STAPH A DNA AMP PROBE: CPT

## 2022-06-29 PROCEDURE — 84439 ASSAY OF FREE THYROXINE: CPT

## 2022-06-29 PROCEDURE — 83036 HEMOGLOBIN GLYCOSYLATED A1C: CPT

## 2022-06-29 PROCEDURE — 82962 GLUCOSE BLOOD TEST: CPT

## 2022-06-29 PROCEDURE — 87641 MR-STAPH DNA AMP PROBE: CPT

## 2022-06-29 PROCEDURE — 82607 VITAMIN B-12: CPT

## 2022-06-29 PROCEDURE — 99232 SBSQ HOSP IP/OBS MODERATE 35: CPT

## 2022-06-29 PROCEDURE — 85610 PROTHROMBIN TIME: CPT

## 2022-06-29 RX ORDER — MYCOPHENOLATE MOFETIL 250 MG/1
2 CAPSULE ORAL
Qty: 0 | Refills: 0 | DISCHARGE
Start: 2022-06-29

## 2022-06-29 RX ORDER — HEPARIN SODIUM 5000 [USP'U]/ML
5000 INJECTION INTRAVENOUS; SUBCUTANEOUS
Qty: 0 | Refills: 0 | DISCHARGE
Start: 2022-06-29

## 2022-06-29 RX ORDER — FOLIC ACID 0.8 MG
1 TABLET ORAL
Qty: 0 | Refills: 0 | DISCHARGE

## 2022-06-29 RX ORDER — FOLIC ACID 0.8 MG
1 TABLET ORAL
Qty: 0 | Refills: 0 | DISCHARGE
Start: 2022-06-29

## 2022-06-29 RX ORDER — LIDOCAINE 4 G/100G
1 CREAM TOPICAL
Qty: 0 | Refills: 0 | DISCHARGE
Start: 2022-06-29

## 2022-06-29 RX ORDER — MYCOPHENOLATE MOFETIL 250 MG/1
1 CAPSULE ORAL
Qty: 0 | Refills: 0 | DISCHARGE
Start: 2022-06-29

## 2022-06-29 RX ORDER — INSULIN GLARGINE 100 [IU]/ML
14 INJECTION, SOLUTION SUBCUTANEOUS
Qty: 0 | Refills: 0 | DISCHARGE
Start: 2022-06-29

## 2022-06-29 RX ORDER — SERTRALINE 25 MG/1
1 TABLET, FILM COATED ORAL
Qty: 0 | Refills: 0 | DISCHARGE

## 2022-06-29 RX ORDER — TRAMADOL HYDROCHLORIDE 50 MG/1
0.5 TABLET ORAL
Qty: 0 | Refills: 0 | DISCHARGE
Start: 2022-06-29

## 2022-06-29 RX ORDER — EVEROLIMUS 10 MG/1
3 TABLET ORAL
Qty: 0 | Refills: 0 | DISCHARGE
Start: 2022-06-29

## 2022-06-29 RX ORDER — POLYETHYLENE GLYCOL 3350 17 G/17G
17 POWDER, FOR SOLUTION ORAL
Qty: 0 | Refills: 0 | DISCHARGE
Start: 2022-06-29

## 2022-06-29 RX ORDER — SERTRALINE 25 MG/1
1 TABLET, FILM COATED ORAL
Qty: 0 | Refills: 0 | DISCHARGE
Start: 2022-06-29

## 2022-06-29 RX ORDER — EVEROLIMUS 10 MG/1
3 TABLET ORAL
Refills: 0 | Status: DISCONTINUED | OUTPATIENT
Start: 2022-06-29 | End: 2022-06-29

## 2022-06-29 RX ORDER — PANTOPRAZOLE SODIUM 20 MG/1
1 TABLET, DELAYED RELEASE ORAL
Qty: 0 | Refills: 0 | DISCHARGE
Start: 2022-06-29

## 2022-06-29 RX ORDER — INSULIN LISPRO 100/ML
10 VIAL (ML) SUBCUTANEOUS
Qty: 0 | Refills: 0 | DISCHARGE
Start: 2022-06-29

## 2022-06-29 RX ORDER — EVEROLIMUS 10 MG/1
4 TABLET ORAL
Qty: 0 | Refills: 0 | DISCHARGE
Start: 2022-06-29

## 2022-06-29 RX ORDER — INSULIN LISPRO 100/ML
2 VIAL (ML) SUBCUTANEOUS
Qty: 0 | Refills: 0 | DISCHARGE
Start: 2022-06-29

## 2022-06-29 RX ORDER — INSULIN LISPRO 100/ML
12 VIAL (ML) SUBCUTANEOUS
Qty: 0 | Refills: 0 | DISCHARGE
Start: 2022-06-29

## 2022-06-29 RX ORDER — OMEGA-3 ACID ETHYL ESTERS 1 G
2 CAPSULE ORAL
Qty: 0 | Refills: 0 | DISCHARGE

## 2022-06-29 RX ORDER — OMEGA-3 ACID ETHYL ESTERS 1 G
1 CAPSULE ORAL
Qty: 0 | Refills: 0 | DISCHARGE
Start: 2022-06-29

## 2022-06-29 RX ORDER — INSULIN LISPRO 100/ML
14 VIAL (ML) SUBCUTANEOUS
Qty: 0 | Refills: 0 | DISCHARGE
Start: 2022-06-29

## 2022-06-29 RX ADMIN — SERTRALINE 100 MILLIGRAM(S): 25 TABLET, FILM COATED ORAL at 12:54

## 2022-06-29 RX ADMIN — Medication 1 MILLIGRAM(S): at 12:54

## 2022-06-29 RX ADMIN — MYCOPHENOLATE MOFETIL 250 MILLIGRAM(S): 250 CAPSULE ORAL at 05:51

## 2022-06-29 RX ADMIN — PANTOPRAZOLE SODIUM 40 MILLIGRAM(S): 20 TABLET, DELAYED RELEASE ORAL at 05:52

## 2022-06-29 RX ADMIN — LIDOCAINE 1 PATCH: 4 CREAM TOPICAL at 01:00

## 2022-06-29 RX ADMIN — EVEROLIMUS 4 MILLIGRAM(S): 10 TABLET ORAL at 09:55

## 2022-06-29 RX ADMIN — HEPARIN SODIUM 5000 UNIT(S): 5000 INJECTION INTRAVENOUS; SUBCUTANEOUS at 05:52

## 2022-06-29 RX ADMIN — CHLORHEXIDINE GLUCONATE 1 APPLICATION(S): 213 SOLUTION TOPICAL at 05:53

## 2022-06-29 RX ADMIN — Medication 2: at 09:56

## 2022-06-29 RX ADMIN — Medication 10 UNIT(S): at 09:56

## 2022-06-29 RX ADMIN — Medication 1 GRAM(S): at 05:51

## 2022-06-29 NOTE — DISCHARGE NOTE NURSING/CASE MANAGEMENT/SOCIAL WORK - NSDCFUADDAPPT_GEN_ALL_CORE_FT
- Follow up in Transplant clinic for lab work and appointment in 2 weeks.     Phone: [phone]    - Neurology follow-up: Dr. Viera: [phone]. Phone [phone]) for EMG/NCS within 1-2 weeks after discharge    - Neurosurgery Follow-up: Dr. Aguilera within  1-2weeks after discharge     Phone: [phone]    - Please follow up with your primary care physician in one week regarding your hospitalization.

## 2022-06-29 NOTE — DISCHARGE NOTE NURSING/CASE MANAGEMENT/SOCIAL WORK - NSDCVIVACCINE_GEN_ALL_CORE_FT
influenza, injectable, quadrivalent, preservative free; 17-Dec-2019 10:15; Rajinder Rivera (RN); [employer]; [id number] (Exp. Date: 30-Jun-2020); IntraMuscular; Deltoid Right.; 0.5 milliLiter(s); VIS (VIS Published: 15-Aug-2019, VIS Presented: 17-Dec-2019);   Pneumococcal conjugate PCV 13; 16-Dec-2019 18:28; Jodie Serrano (RN); [facility]; [id number] (Exp. Date: 01-Jun-2021); IntraMuscular; Deltoid Right.; 0.5 milliLiter(s); VIS (VIS Published: 05-Nov-2015, VIS Presented: 16-Dec-2019);   pneumococcal polysaccharide PPV23; 18-Feb-2020 13:31; Carroll Guardado (RN); [employer] [id number] (Exp. Date: 18-May-2021); IntraMuscular; Deltoid Right.; 0.5 milliLiter(s); VIS (VIS Published: 06-Oct-2009, VIS Presented: 18-Feb-2020);

## 2022-06-29 NOTE — PROGRESS NOTE ADULT - SUBJECTIVE AND OBJECTIVE BOX
Transplant Surgery - Multidisciplinary Rounds  --------------------------------------------------------------  OLT 7/2/2020    Present: Patient seen and examined with multidisciplinary Transplant team including  Surgeon: Dr. Lucio, Hepatologist: Dr. Medina, NP Xavi and unit RN during AM rounds.   Disciplines not in attendance will be notified of the plan.     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    s/p OLT 7/2/20 course c/b VRE bacteremia, L foot OM, CNI toxicity to both Tacrolimus and Cyclosporine; switched to Everolimus 7/27/20    Admitted from 12/20/21 to 1/6/22 s/p fall at home, no LOC or injuries.  - Found to be +Covid w/ CT chest findings concerning for beginnings of multifocal PNA. Received remdesivir and mononclonal antibodies and on dexamethasone (12/29-1/7).   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 PCP prophylaxis changed from bactrim to atovaquone for LFTs.   -Patient was found to have DVT on L femoral vein and started on full treatment AC, now on Eliquis 5mg BID.     ------------  Transferred to [facility] from [facility] on 6/16/2022 where he was admitted for multiple falls over 24hr period.  +HA, +neck pain, +L rib pain.  Imaging revealed:  -C5-C7 mod-severe spinal stenosis  -L rib fx  -5mm distal L ureteral stone  -also w/ diarrhea    Interval Events:  - No complaints.    - Flomax stopped with concern for orthostatic hypotension    Immunosuppression:  Everolimus 4mg in AM, 3mg in PM, MMF 250mg bid (due to GI symptoms)    Potential Discharge date: today/tomorrow  Education:  Medications  Plan of care:  See Below      MEDICATIONS  (STANDING):  chlorhexidine 2% Cloths 1 Application(s) Topical <User Schedule>  dextrose 5%. 1000 milliLiter(s) (100 mL/Hr) IV Continuous <Continuous>  dextrose 5%. 1000 milliLiter(s) (50 mL/Hr) IV Continuous <Continuous>  dextrose 50% Injectable 25 Gram(s) IV Push once  dextrose 50% Injectable 12.5 Gram(s) IV Push once  dextrose 50% Injectable 25 Gram(s) IV Push once  everolimus (ZORTRESS) 4 milliGRAM(s) Oral <User Schedule>  everolimus (ZORTRESS) 3 milliGRAM(s) Oral <User Schedule>  folic acid 1 milliGRAM(s) Oral daily  glucagon  Injectable 1 milliGRAM(s) IntraMuscular once  heparin   Injectable 5000 Unit(s) SubCutaneous every 12 hours  insulin glargine Injectable (LANTUS) 14 Unit(s) SubCutaneous at bedtime  insulin lispro (ADMELOG) corrective regimen sliding scale   SubCutaneous three times a day before meals  insulin lispro (ADMELOG) corrective regimen sliding scale   SubCutaneous at bedtime  insulin lispro Injectable (ADMELOG) 10 Unit(s) SubCutaneous three times a day before meals  lidocaine   4% Patch 1 Patch Transdermal daily  lidocaine   4% Patch 1 Patch Transdermal daily  mycophenolate mofetil 250 milliGRAM(s) Oral two times a day  omega-3-Acid Ethyl Esters 1 Gram(s) Oral two times a day  pantoprazole    Tablet 40 milliGRAM(s) Oral before breakfast  polyethylene glycol 3350 17 Gram(s) Oral daily  sertraline 100 milliGRAM(s) Oral daily    MEDICATIONS  (PRN):  artificial  tears Solution 1 Drop(s) Both EYES four times a day PRN Dry Eyes  dextrose Oral Gel 15 Gram(s) Oral once PRN Blood Glucose LESS THAN 70 milliGRAM(s)/deciliter  traMADol 25 milliGRAM(s) Oral every 12 hours PRN Severe Pain (7 - 10)      PAST MEDICAL & SURGICAL HISTORY:  Diabetes      Hypercholesteremia      Neuropathy      Hypertension      Renal stones  25 years ago      Fatty liver disease, nonalcoholic      Obesity      Hepatic encephalopathy      GERD with esophagitis  Gastritis &amp; Non Bleeding Ulcers      GIB (gastrointestinal bleeding)      S/P cholecystectomy          Vital Signs Last 24 Hrs  T(C): 37 (29 Jun 2022 09:00), Max: 37 (29 Jun 2022 09:00)  T(F): 98.6 (29 Jun 2022 09:00), Max: 98.6 (29 Jun 2022 09:00)  HR: 81 (29 Jun 2022 09:00) (69 - 86)  BP: 138/69 (29 Jun 2022 09:00) (137/73 - 149/70)  BP(mean): --  RR: 18 (29 Jun 2022 09:00) (18 - 18)  SpO2: 95% (29 Jun 2022 09:00) (95% - 97%)    I&O's Summary    28 Jun 2022 07:01  -  29 Jun 2022 07:00  --------------------------------------------------------  IN: 960 mL / OUT: 2175 mL / NET: -1215 mL    29 Jun 2022 07:01  -  29 Jun 2022 11:19  --------------------------------------------------------  IN: 240 mL / OUT: 400 mL / NET: -160 mL                              9.5    3.56  )-----------( 204      ( 29 Jun 2022 06:24 )             30.2     06-29    139  |  108  |  45<H>  ----------------------------<  165<H>  4.6   |  21<L>  |  2.22<H>    Ca    9.0      29 Jun 2022 06:25  Phos  3.5     06-29  Mg     2.0     06-29    TPro  6.7  /  Alb  3.3  /  TBili  0.2  /  DBili  x   /  AST  31  /  ALT  86<H>  /  AlkPhos  144<H>  06-29      Review of systems  Gen: No weight changes, fatigue, fevers/chills, see hpi  Skin: No rashes  Head/Eyes/Ears/Mouth: No headache; Normal hearing; Normal vision w/o blurriness; No sinus pain/discomfort, sore throat  Respiratory: No dyspnea, cough, wheezing, hemoptysis  CV: No chest pain, PND, orthopnea  GI:  no diarrhea, constipation, nausea, vomiting, melena, hematochezia  : No increased frequency, dysuria, hematuria, nocturia  MSK: see hpi, no edema  Neuro: No dizziness/lightheadedness, weakness, seizures, numbness, tingling  Heme: No easy bruising or bleeding  Endo: No heat/cold intolerance  Psych: No significant nervousness, anxiety, stress, depression  All other systems were reviewed and are negative, except as noted.      PHYSICAL EXAM:  Constitutional: Well developed / well nourished  Eyes: anicteric, PERRLA  ENMT: nc/at, no thrush, loose front tooth  Neck: supple  Respiratory: CTA B/L  Cardiovascular: RRR.  Gastrointestinal: Soft abdomen, ND/NT. well healed incisional scar  Genitourinary: Voiding spontaneously  Extremities: SCD's in place and working bilaterally, no calf TTP. no edema. L rib pain to palpation  Vascular: Palpable dp pulses bilaterally.   Neurological: A&O x3  Skin: no rashes, ulcerations, lesions  Musculoskeletal: Moving all extremities, decreased strength throughout  Psychiatric: Responsive

## 2022-06-29 NOTE — PROGRESS NOTE ADULT - SUBJECTIVE AND OBJECTIVE BOX
INTERVAL HPI/OVERNIGHT EVENTS:  Events noted     MEDICATIONS  (STANDING):  atorvastatin 80 milliGRAM(s) Oral at bedtime  dextrose 5%. 1000 milliLiter(s) (100 mL/Hr) IV Continuous <Continuous>  dextrose 5%. 1000 milliLiter(s) (50 mL/Hr) IV Continuous <Continuous>  dextrose 50% Injectable 25 Gram(s) IV Push once  dextrose 50% Injectable 12.5 Gram(s) IV Push once  dextrose 50% Injectable 25 Gram(s) IV Push once  everolimus (ZORTRESS) 4 milliGRAM(s) Oral <User Schedule>  folic acid 1 milliGRAM(s) Oral daily  glucagon  Injectable 1 milliGRAM(s) IntraMuscular once  heparin   Injectable 5000 Unit(s) SubCutaneous every 12 hours  insulin glargine Injectable (LANTUS) 8 Unit(s) SubCutaneous at bedtime  insulin lispro (ADMELOG) corrective regimen sliding scale   SubCutaneous three times a day before meals  insulin lispro (ADMELOG) corrective regimen sliding scale   SubCutaneous at bedtime  insulin lispro Injectable (ADMELOG) 6 Unit(s) SubCutaneous three times a day before meals  lidocaine   4% Patch 1 Patch Transdermal daily  lidocaine   4% Patch 1 Patch Transdermal daily  mycophenolate mofetil 250 milliGRAM(s) Oral two times a day  omega-3-Acid Ethyl Esters 1 Gram(s) Oral two times a day  pantoprazole    Tablet 40 milliGRAM(s) Oral before breakfast  polyethylene glycol 3350 17 Gram(s) Oral once  predniSONE   Tablet 5 milliGRAM(s) Oral daily  sertraline 100 milliGRAM(s) Oral daily  sodium chloride 0.9%. 1000 milliLiter(s) (50 mL/Hr) IV Continuous <Continuous>  tamsulosin 0.4 milliGRAM(s) Oral at bedtime    MEDICATIONS  (PRN):  dextrose Oral Gel 15 Gram(s) Oral once PRN Blood Glucose LESS THAN 70 milliGRAM(s)/deciliter  traMADol 25 milliGRAM(s) Oral every 12 hours PRN Severe Pain (7 - 10)      Allergies    codeine (Anaphylaxis)    Intolerances        ROS:   General:  No wt loss, fevers, chills, night sweats, fatigue,   Eyes:  Good vision, no reported pain  ENT:  No sore throat, pain, runny nose, dysphagia  CV:  No pain, palpitations, hypo/hypertension  Resp:  No dyspnea, cough, tachypnea, wheezing  GI:  No pain, No nausea, No vomiting, No diarrhea, No constipation, No weight loss, No fever, No pruritis, No rectal bleeding, No tarry stools, No dysphagia,  :  No pain, bleeding, incontinence, nocturia  Muscle:  No pain, weakness  Neuro:  No weakness, tingling, memory problems  Psych:  No fatigue, insomnia, mood problems, depression  Endocrine:  No polyuria, polydipsia, cold/heat intolerance  Heme:  No petechiae, ecchymosis, easy bruisability  Skin:  No rash, tattoos, scars, edema      PHYSICAL EXAM:   Vital Signs Last 24 Hrs  T(C): 37 (2022 09:00), Max: 37 (2022 09:00)  T(F): 98.6 (:00), Max: 98.6 (2022 09:00)  HR: 81 (:00) (69 - 86)  BP: 138/69 (2022 09:00) (137/73 - 149/70)  BP(mean): --  RR: 18 (2022 09:00) (18 - 18)  SpO2: 95% (2022 09:00) (95% - 97%)Daily     Daily Weight in k.5 (2022 21:06)    GENERAL:  Appears stated age,   HEENT:  NC/AT,    CHEST:  Full & symmetric excursion,   HEART:  Regular rhythm,  ABDOMEN:  Soft, non-tender, non-distended,  EXTEREMITIES:  no cyanosis  SKIN:  No rash  NEURO:  Alert,       LABS:                                   9.2    2.93  )-----------( 207      ( 2022 07:03 )             29.0   -    140  |  107  |  44<H>  ----------------------------<  175<H>  4.3   |  21<L>  |  2.00<H>    Ca    8.8      2022 07:06  Phos  3.4       Mg     2.0         TPro  6.4  /  Alb  3.4  /  TBili  0.2  /  DBili  x   /  AST  43<H>  /  ALT  105<H>  /  AlkPhos  142<H>              RADIOLOGY & ADDITIONAL TESTS:         INTERVAL HPI/OVERNIGHT EVENTS:  Events noted     MEDICATIONS  (STANDING):  atorvastatin 80 milliGRAM(s) Oral at bedtime  dextrose 5%. 1000 milliLiter(s) (100 mL/Hr) IV Continuous <Continuous>  dextrose 5%. 1000 milliLiter(s) (50 mL/Hr) IV Continuous <Continuous>  dextrose 50% Injectable 25 Gram(s) IV Push once  dextrose 50% Injectable 12.5 Gram(s) IV Push once  dextrose 50% Injectable 25 Gram(s) IV Push once  everolimus (ZORTRESS) 4 milliGRAM(s) Oral <User Schedule>  folic acid 1 milliGRAM(s) Oral daily  glucagon  Injectable 1 milliGRAM(s) IntraMuscular once  heparin   Injectable 5000 Unit(s) SubCutaneous every 12 hours  insulin glargine Injectable (LANTUS) 8 Unit(s) SubCutaneous at bedtime  insulin lispro (ADMELOG) corrective regimen sliding scale   SubCutaneous three times a day before meals  insulin lispro (ADMELOG) corrective regimen sliding scale   SubCutaneous at bedtime  insulin lispro Injectable (ADMELOG) 6 Unit(s) SubCutaneous three times a day before meals  lidocaine   4% Patch 1 Patch Transdermal daily  lidocaine   4% Patch 1 Patch Transdermal daily  mycophenolate mofetil 250 milliGRAM(s) Oral two times a day  omega-3-Acid Ethyl Esters 1 Gram(s) Oral two times a day  pantoprazole    Tablet 40 milliGRAM(s) Oral before breakfast  polyethylene glycol 3350 17 Gram(s) Oral once  predniSONE   Tablet 5 milliGRAM(s) Oral daily  sertraline 100 milliGRAM(s) Oral daily  sodium chloride 0.9%. 1000 milliLiter(s) (50 mL/Hr) IV Continuous <Continuous>  tamsulosin 0.4 milliGRAM(s) Oral at bedtime    MEDICATIONS  (PRN):  dextrose Oral Gel 15 Gram(s) Oral once PRN Blood Glucose LESS THAN 70 milliGRAM(s)/deciliter  traMADol 25 milliGRAM(s) Oral every 12 hours PRN Severe Pain (7 - 10)      Allergies    codeine (Anaphylaxis)    Intolerances        ROS:   General:  No wt loss, fevers, chills, night sweats, fatigue,   Eyes:  Good vision, no reported pain  ENT:  No sore throat, pain, runny nose, dysphagia  CV:  No pain, palpitations, hypo/hypertension  Resp:  No dyspnea, cough, tachypnea, wheezing  GI:  No pain, No nausea, No vomiting, No diarrhea, No constipation, No weight loss, No fever, No pruritis, No rectal bleeding, No tarry stools, No dysphagia,  :  No pain, bleeding, incontinence, nocturia  Muscle:  No pain, weakness  Neuro:  No weakness, tingling, memory problems  Psych:  No fatigue, insomnia, mood problems, depression  Endocrine:  No polyuria, polydipsia, cold/heat intolerance  Heme:  No petechiae, ecchymosis, easy bruisability  Skin:  No rash, tattoos, scars, edema      PHYSICAL EXAM:   Vital Signs Last 24 Hrs  T(C): 37 (2022 09:00), Max: 37 (2022 09:00)  T(F): 98.6 (:00), Max: 98.6 (2022 09:00)  HR: 81 (:00) (69 - 86)  BP: 138/69 (2022 09:00) (137/73 - 149/70)  BP(mean): --  RR: 18 (2022 09:00) (18 - 18)  SpO2: 95% (2022 09:00) (95% - 97%)  Daily     Daily Weight in k.5 (2022 21:06)    GENERAL:  Appears stated age,   HEENT:  NC/AT,    CHEST:  Full & symmetric excursion,   HEART:  Regular rhythm,  ABDOMEN:  Soft, non-tender, non-distended,  EXTEREMITIES:  no cyanosis  SKIN:  No rash  NEURO:  Alert,       LABS:                                   9.2    2.93  )-----------( 207      ( 2022 07:03 )             29.0   -    140  |  107  |  44<H>  ----------------------------<  175<H>  4.3   |  21<L>  |  2.00<H>    Ca    8.8      2022 07:06  Phos  3.4       Mg     2.0         TPro  6.4  /  Alb  3.4  /  TBili  0.2  /  DBili  x   /  AST  43<H>  /  ALT  105<H>  /  AlkPhos  142<H>              RADIOLOGY & ADDITIONAL TESTS:

## 2022-06-29 NOTE — PROGRESS NOTE ADULT - NS ATTEND AMEND GEN_ALL_CORE FT
MRIs today then f/u w neuro  dispo planning  immuno: everolimus, MMF, pred
continues PT work.   likely to need acute rehab  immuno: everolimus, pred, mmf 250mg BID
agree with above plan  feels better  lfts stable  creatinine around 2; stable  left ureteral stone; will repeat US  immunosuppression everolimus and prednisone
continues to work w PT.   appreciate neurology input.
feels better  stable lfts and creatinine   plan discharge to rehab
outpt neuro f/u arranged  needs acute rehab  immuno: everolimus/cellcept.
feels better  US with resolving hydro  lfts stable  immunosuppression everolimus and mmf   plan discharge to subacute rehab
needs to continue PT, acute rehab placement pending.  immuno: everolimus, MMF
needs work on PT  stable for acute rehab  immuno: everolimus, pred
plan for acute rehab placement upcoming.   immuno: everolimus, MMF 250mg BID, pred 5.
Agree with above plan.

## 2022-06-29 NOTE — PROGRESS NOTE ADULT - ASSESSMENT
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    s/p OLT 7/2/20 (course c/b VRE bacteremia, L Foot OM, CNI toxicity to both Tacrolimus and Cyclosporine; switched to Everolimus 7/27/20).  Recent syncope and fall, COVID all in 12/2022.  Transferred s/p fall from [place]    s/p fall   - CT neck with mod-severe spinal stenosis with associated pain. Neuro/NeuroSx teams consulted, appreciate input  - MRI neck non-con with mod canal stenosis  - MRI lumbar completed.    - Neurosurgery: Can pursue rest of workup in outpatient setting. Can be referred to ortho spine and neurology (Dr Viera: [id number] [phone]) for EMG/NCS within 1-2 weeks after discharge. Given likely chronicity of pathology, low current clinical utility for steroids  - Continue PT/OT, pain management, fall precautions . Physiatry recommending Acute rehab. Declined by Jerry Cordero. Accepted at [facility].       [] Hydronephrosis, L ureteral stone  - Appreciate  input,   - repeat renal us with no acute findings     [] s/p OLT  - LFTs stable  - Atorvastatin DCd 6/21  - SQH/SCDs  - Daily CBC, CMP, INR, Mag, Phos, Everolimus level  - watch WBC, leukopenic. Off valcyte, MMF 250bid     Immunosuppression  -continue Everolimus per level, MMF  250BID.  Off steroids   -PPx: off bactrim/valcyte/nystatin  - CMV +/-, Neg CMV PCR on 6/16.  Fu CMV PCR sent 6/28    DM:  - glucose improving  - Continue lantus/lispro  - Continue ISS  - Diabetic diet    Dispo:  - awaiting dc to rehab today/tomorrow

## 2022-06-29 NOTE — PROGRESS NOTE ADULT - ATTENDING COMMENTS
65 y/o M w/ hx of JEAN cirrhosis s/p OLT 7/2/20 w/ hx of CNI toxicity requiring switch to EVR, IDDM, HLD, obesity here for recurrent falls.    MRI L spine unrevealing for cause of falls  would benefit from neurology outpatient f/u and EMG. will try to set that up now  persistent mild elevations in liver enzymes, NRH on liver biopsy from 12/2021, holding lipitor  dispo to acute rehab
65 y/o M w/ hx of JEAN cirrhosis s/p OLT 7/2/20 w/ hx of CNI toxicity requiring switch to EVR, IDDM, HLD, obesity here for recurrent falls.    MRI L spine unrevealing for cause of falls  would benefit from neurology outpatient f/u and EMG. will try to set that up now  persistent mild elevations in liver enzymes, NRH on liver biopsy from 12/2021, holding lipitor  dispo to acute rehab  stop prednisone
66M, obesity, DM2, JEAN cirrhosis s/p liver transplant 7/2/20, complicated posttransplant course as above, NRH found on biopsy 12/2021 done in setting of mild COVID and elevated LFTs, initially admitted to [facility] on 6/15 after a fall with left-sided rib fractures, after inability to move his legs. Also found L ureteral stone with hydronephrosis. Transferred to [facility] on 6/16 for further management.    - fall, inability to move legs - now albe to move feet, but lacks coordination and strenght to walk. CT neck reviewed, MRI cervical spine 6/17: cervical spondylosis and cord flattening. Neurology consulted.    - L ureteral stone, ureter not visualized on US 6/17 - may have passed. Urine now yellow. Conservative management per urology.    - mild ALT elevation - JEAN vs. NRH.  - CMV positive donor to negative recipient).  CMV PCR negative on May 9, 2022 as well as June 16, 2022 (was positive on May 20, 2022 with a level of 346.  Discussed with ID.    -- f/u with neurology   -- continue everolimus 4 mg twice a day; target levels 3-5 ng/mL, CellCept 250 mg BID, prednisone 5 mg qd.   -- off valcyte now   -- would use semaglutide for DM as outpatient, rather than insulin, to help lose further weight.
66M, obesity, DM2, JEAN cirrhosis s/p liver transplant 7/2/20, complicated posttransplant course as above, NRH found on biopsy 12/2021 done in setting of mild COVID and elevated LFTs, initially admitted to [facility] on 6/15 after a fall with left-sided rib fractures, after inability to move his legs. Also found L ureteral stone with hydronephrosis. Transferred to [facility] on 6/16 for further management.    - fall due to inability to move legs - now full strength, but unable to get OOB b/o rib pain. CT neck reviewed, MRI cervical spine 6/17: cervical spondylosis and cord flattening. Neurology consulted.    - L ureteral stone, ureter not visualized on US 6/17 - may have passed. Urine now yellow. Conservative management per urology.    - mild ALT elevation - JEAN vs. NRH.  - CMV positive donor to negative recipient).  CMV PCR negative on May 9, 2022 as well as June 16, 2022 (was positive on May 20, 2022 with a level of 346.  Discussed with ID.  - off valcyte    -- f/u MRI thoracic and lumbar spine  -- continue everolimus 4 mg twice a day; target levels 3-5 ng/mL, CellCept 250 mg BID, prednisone 5 mg qd.      -- primary team to update home medications before discharge - he is on Ozempic and on much lower doses of insulin than charted in the H&P.
67 y/o M w/ hx of JEAN cirrhosis s/p OLT 7/2/20 w/ hx of CNI toxicity requiring switch to EVR, IDDM, HLD, obesity here for recurrent falls.    MRI L spine unrevealing for cause of falls  would benefit from neurology outpatient f/u  refusing discharge back to rehab but disposition per PT  persistent mild elevations in liver enzymes, NRH on liver biopsy from 12/2021, stop lipitor now
67 y/o M w/ hx of JEAN cirrhosis s/p OLT 7/2/20 w/ hx of CNI toxicity requiring switch to EVR, IDDM, HLD, obesity here for recurrent falls.    Pending MRI T/L spine  would benefit from neurology outpatient f/u  refusing discharge back to rehab  persistent mild elevations in liver enzymes, NRH on liver biopsy from 12/2021
Patient status post orthotopic liver transplantation on July 2, 2020 currently admitted following a fall and further investigation revealed ureteral stone.   Stbel CKD.  Patient has been evaluated by neurology for falls although etiology so far has not been entirely clear.  Noted orthostatic hypotension, likely related to Flomax, and has been discontinued.  In addition patient also has diabetic neuropathy which might be contributing to his falls.  Everolimus level 7.9 on everolimus to 4 mg a morning and 3 mg daily.  Keep Valcyte off.  Continue with PT/OT.  Dispition to subacute rehab today.
Patient status post orthotopic liver transplantation on July 2, 2020 currently admitted following a fall and further investigation revealed ureteral stone.  Labs from today revealed MISA with mild worsening.  Would recommend nephrology consultation.  Patient has been evaluated by neurology for falls although etiology so far has not been entirely clear.  Noted orthostatic hypotension, likely related to Flomax.  Will discuss with urology for alternative management.  In the edition patient also has diabetic neuropathy which might be contributing to his falls.  He will continue with current immunosuppression regimen with everolimus 4 mg twice a day.  Off Valcyte.  Continue with PT/OT.  Likely will need outpatient acute rehab.
65 y/o M w/ hx of JEAN cirrhosis s/p OLT 7/2/20 w/ hx of CNI toxicity requiring switch to EVR, IDDM, HLD, obesity here for recurrent falls.    MRI L spine unrevealing for cause of falls  would benefit from neurology outpatient f/u and EMG  refusing discharge back to rehab but disposition per PT  persistent mild elevations in liver enzymes, NRH on liver biopsy from 12/2021, holding lipitor, improving
65 y/o M w/ hx of JEAN cirrhosis s/p OLT 7/2/20 w/ hx of CNI toxicity requiring switch to EVR, IDDM, HLD, obesity here for recurrent falls.    MRI L spine unrevealing for cause of falls  would benefit from neurology outpatient f/u and EMG.   persistent mild elevations in liver enzymes, NRH on liver biopsy from 12/2021, holding lipitor  dispo to acute rehab  stopped prednisone
66M w/ PMHx JEAN cirrhosis s/p OLT 7/2/20 (course c/b VRE bacteremia, CNI toxicity to both tacro and cyclosporine; switched to everolimus 7/27/20), HLD, IDDM, obesity, HFpEF presenting w/ fall and ureteral stone to [facility] on 6/15/22, now transferred to [facility] for further management. Ureteral stone c/b hydronephrosis, immunosuppression: everolimus 4 mg BID (dose by level), and CellCept 250 mg BID, and prednisone 5mg daily
Patient status post orthotopic liver transplantation on July 2, 2020 currently admitted following a fall and further investigation revealed ureteral stone.  Labs from today revealed MISA with mild worsening.    Would recommend nephrology consultation.  Patient has been evaluated by neurology for falls although etiology so far has not been entirely clear.  Noted orthostatic hypotension, likely related to Flomax.  Will discuss with urology for alternative management.  In addition patient also has diabetic neuropathy which might be contributing to his falls.  Everolimus level 6.6.  Recommend reducing dose of everolimus to 4 mg a morning and 3 mg daily.  Keep Valcyte off.  Continue with PT/OT.  Likely will need outpatient acute/subacute rehab.
66-year-old male with a known history of cirrhosis secondary to nonalcoholic steatohepatitis, status post orthotopic liver transplantation July 2, 2020 with complicated posttransplant course as noted in fellow's note.  Patient currently admitted status post fall and a ureteral stone initially at [facility] on Kristi 15, 2022 and was transferred to [facility] for further management.    Urology evaluation is appreciated.  Plan is to continue with Flomax and IV hydration with the hope that the stone potentially will pass out.  Continue with Everolimus was 4 mg twice a day (would keep target levels between 3-5), CellCept to 250 mg twice a day and prednisone 5 mg daily.    Patient remains on Valcyte 450 mg daily (CMV positive donor to negative recipient).  CMV PCR negative on May 9, 2022 as well as June 16, 2022 (was positive on May 20, 2022 with a level of 346.  Discussed with ID and would favor discontinuing at this time.

## 2022-06-29 NOTE — PROGRESS NOTE ADULT - SUBJECTIVE AND OBJECTIVE BOX
Interval Events:   - No acute events  - Flomax stopped due to c/f orthostatic hypotension   - D/c planning (possibly home with PT)    Allergies:  codeine (Anaphylaxis)        Hospital Medications:  artificial  tears Solution 1 Drop(s) Both EYES four times a day PRN  dextrose 5%. 1000 milliLiter(s) IV Continuous <Continuous>  dextrose 5%. 1000 milliLiter(s) IV Continuous <Continuous>  dextrose 50% Injectable 25 Gram(s) IV Push once  dextrose 50% Injectable 12.5 Gram(s) IV Push once  dextrose 50% Injectable 25 Gram(s) IV Push once  dextrose Oral Gel 15 Gram(s) Oral once PRN  everolimus (ZORTRESS) 4 milliGRAM(s) Oral <User Schedule>  folic acid 1 milliGRAM(s) Oral daily  glucagon  Injectable 1 milliGRAM(s) IntraMuscular once  heparin   Injectable 5000 Unit(s) SubCutaneous every 12 hours  insulin glargine Injectable (LANTUS) 14 Unit(s) SubCutaneous at bedtime  insulin lispro (ADMELOG) corrective regimen sliding scale   SubCutaneous three times a day before meals  insulin lispro (ADMELOG) corrective regimen sliding scale   SubCutaneous at bedtime  insulin lispro Injectable (ADMELOG) 10 Unit(s) SubCutaneous three times a day before meals  lidocaine   4% Patch 1 Patch Transdermal daily  lidocaine   4% Patch 1 Patch Transdermal daily  mycophenolate mofetil 250 milliGRAM(s) Oral two times a day  omega-3-Acid Ethyl Esters 1 Gram(s) Oral two times a day  pantoprazole    Tablet 40 milliGRAM(s) Oral before breakfast  polyethylene glycol 3350 17 Gram(s) Oral daily  sertraline 100 milliGRAM(s) Oral daily  tamsulosin 0.4 milliGRAM(s) Oral at bedtime  traMADol 25 milliGRAM(s) Oral every 12 hours PRN      PMHX/PSHX:  Diabetes    Hypercholesteremia    Neuropathy    Hypertension    Renal stones    Fatty liver disease, nonalcoholic    Obesity    Hepatic encephalopathy    GERD with esophagitis    GIB (gastrointestinal bleeding)    No significant past surgical history    S/P cholecystectomy        Family history:  No pertinent family history in first degree relatives    Family history of stomach cancer    Family history of hypertension    Family history of type 2 diabetes mellitus        ROS: As per HPI, otherwise 14-point ROS reviewed and negative.      PHYSICAL EXAM:   Vital Signs Last 24 Hrs  T(C): 37 (29 Jun 2022 09:00), Max: 37 (29 Jun 2022 09:00)  T(F): 98.6 (29 Jun 2022 09:00), Max: 98.6 (29 Jun 2022 09:00)  HR: 81 (29 Jun 2022 09:00) (69 - 86)      BP: 138/69 (29 Jun 2022 09:00) (137/73 - 149/70)  BP(mean): --  RR: 18 (29 Jun 2022 09:00) (18 - 18)  SpO2: 95% (29 Jun 2022 09:00) (95% - 97%)    GENERAL:  NAD, Appears stated age  HEENT:  NC/AT,  conjunctivae clear and pink, sclera -anicteric  CHEST:  Normal Effort, Breath sounds clear  HEART:  RRR, S1 + S2, no murmurs  ABDOMEN:  Soft, non-tender, non-distended, normoactive bowel sounds,  no masses  EXTREMITIES:  no cyanosis or edema  SKIN:  Warm & Dry. No rash or erythema  NEURO:  Alert, oriented, no focal deficit        LABS:                        9.5    3.56  )-----------( 204      ( 29 Jun 2022 06:24 )             30.2     06-29    139  |  108  |  45<H>  ----------------------------<  165<H>  4.6   |  21<L>  |  2.22<H>    Ca    9.0      29 Jun 2022 06:25  Phos  3.5     06-29  Mg     2.0     06-29    TPro  6.7  /  Alb  3.3  /  TBili  0.2  /  DBili  x   /  AST  31  /  ALT  86<H>  /  AlkPhos  144<H>  06-29    LIVER FUNCTIONS - ( 29 Jun 2022 06:25 )  Alb: 3.3 g/dL / Pro: 6.7 g/dL / ALK PHOS: 144 U/L / ALT: 86 U/L / AST: 31 U/L / GGT: x                           Imaging:

## 2022-06-29 NOTE — PROGRESS NOTE ADULT - ASSESSMENT
66M w/ PMHx JEAN cirrhosis s/p OLT 7/2/20 (course c/b VRE bacteremia, CNI toxicity to both tacro and cyclosporine; switched to everolimus 7/27/20), HLD, IDDM, obesity, HFpEF presenting w/ fall and ureteral stone to [facility] on 6/15/22, now transferred to [facility] for further management.     Impression:   # CKD - slightly worsening creatinine - repeat kidney US: Mild left hydronephrosis. No calculus is seen in the visualized ureter,   however there is a portion of the ureter that is obscured secondary to bowel gas which correlates with the location of the renal stone seen on CT abdomen pelvis 6/15/2022.    #Fall, rib fracture: now second admission for falls c/b rib fracture. Per neurologic evaluation possibly 2/2 spinal canal stenosis   #Ureteral stone c/b hydronephrosis, stable/improved  #Elevated liver enzymes: liver biopsy 12/30/21 without e/o rejection, found to have NRH  #JEAN cirrhosis s/p LT  #CMV viremia   #DM  #Anemia    Recommendations:  - will need to discuss with urology re: alternative to flomax   - immunosuppression: everolimus 4/3 mg BID (dose by level)  - remains off valcyte per ID   - will need outpatient neurology follow up (re: further evaluation of falls, EMG studies)   - appreciate dispo planning; home w/ PT vs [facility]        Thank you for involving us in the care of this patient, please reach out if any further questions.     Ky Ram MD  Gastroenterology/Hepatology Fellow, PGY5    Available on [employer] Teams  [phone] ([facility])  [id number] ([facility])  Please contact on call fellow weekdays after 5pm-7am and weekends: [phone]

## 2022-06-29 NOTE — PROGRESS NOTE ADULT - PROVIDER SPECIALTY LIST ADULT
Gastroenterology
Gastroenterology
Transplant Surgery
Gastroenterology
Rehab Medicine
Transplant Hepatology
Transplant Surgery
Gastroenterology
Hepatology
Transplant Hepatology
Transplant Surgery
Gastroenterology
Transplant Hepatology
Transplant Surgery
Transplant Surgery
Transplant Nephrology
Transplant Nephrology

## 2022-06-29 NOTE — PROGRESS NOTE ADULT - REASON FOR ADMISSION
ureteral calculus, hydroureteronephrosis

## 2022-06-30 LAB — EVEROLIMUS, WHOLE BLOOD RESULT: 9.1 NG/ML — HIGH (ref 3–8)

## 2022-07-01 LAB — EVEROLIMUS, WHOLE BLOOD RESULT: 9.9 NG/ML — HIGH (ref 3–8)

## 2022-07-02 LAB
EVEROLIMUS, WHOLE BLOOD RESULT: 8.9 NG/ML — HIGH (ref 3–8)
VIT B1 SERPL-MCNC: 131 NMOL/L — SIGNIFICANT CHANGE UP (ref 66.5–200)

## 2022-07-05 ENCOUNTER — APPOINTMENT (OUTPATIENT)
Age: 67
End: 2022-07-05

## 2022-07-17 PROBLEM — N40.0 BPH (BENIGN PROSTATIC HYPERPLASIA): Status: ACTIVE | Noted: 2020-02-08

## 2022-07-17 PROBLEM — R79.89 ELEVATED LFTS: Status: ACTIVE | Noted: 2021-12-07

## 2022-07-17 NOTE — ASSESSMENT
[FreeTextEntry1] : Mr. 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  His other medical problems include diabetes mellitus type 2, hyperlipidemia, obesity, HFpEF with mild LV diastolic dysfunction, MGUS, chronic anemia, obstructive sleep apnea.  Recent prolonged hospitalization following a fall with rib fracture.  Also was noted to have a left ureteral stone and was treated with Flomax with possible passage of the stone as evidenced by negative ultrasound on follow-up.  I\par His immunosuppression currently includes everolimus 3 mg twice a day and CellCept 250 mg twice a day.  His Valcyte was recently discontinued during his hospitalization after noting negative CMV PCR.  However on the day of discharge he had a CMV PCR sent and the result is now positive with a low level of viremia.  He used to be on Valcyte 450 mg once a day. \par \par \par PLAN\par #  Status post orthotopic liver transplantation\par Patient is status post orthotopic liver transplantation secondary to nonalcoholic steatohepatitis.  He has mild elevation of liver tests with normal bilirubin level.  We will continue with current dose of immunosuppression with tacrolimus 3 mg twice a day and CellCept 250 mg twice daily.  \par \par I recommended follow-up labs today that will include CBC, CMP, PT/INR, everolimus level\par \par #  Dyslipidemia\par Patient was on ezetimibe 10 mg daily and rosuvastatin 20 mg daily at bedtime.  In addition he was taking Lovaza.  It is unclear if patient is still on those medications.  Patient is going to give us a call back and let us know after verifying his medications.  Transplant coordinator Amie Duncan will follow up and verify.\par \par #  CMV/PCP prophylaxis\par Patient has a low level of CMV viremia after discontinuation of Valcyte.  I have discussed with ID specialist Dr. Suarez. No need to resume Valcyte at this time.  Will monitor with follow-up labs in about 2 weeks.\par \par #  Diabetes mellitus type 2\par Well-controlled diabetes at this time.  A1c is down to 6.4%.  He is going to follow-up with his endocrinologist. With regards to his management of diabetes he is currently on insulin glargine 14 units subcutaneously once daily at bedtime and Admelog 10 units subcutaneously 3 times a day before meals with additional sliding scale coverage.\par \par #  Coronary artery disease prophylaxis\par Continue with aspirin 81 mg daily.\par \par # ?  Suspected UTI versus BPH\par I recommended an urinalysis with urine culture.  We will follow-up results and will prescribe antibiotics if needed.  Patient denies any fever, chills, rigor at this time.  I also recommended him to follow-up with his urologist.  We will resume Flomax.  I also recommended him to closely watch for any postural hypotension symptoms after initiating Flomax.\par \par \par Return to hepatology clinic in about 3 months.

## 2022-07-17 NOTE — PHYSICAL EXAM
[General Appearance - Alert] : alert [General Appearance - In No Acute Distress] : in no acute distress [Sclera] : the sclera and conjunctiva were normal [PERRL With Normal Accommodation] : pupils were equal in size, round, and reactive to light [Extraocular Movements] : extraocular movements were intact [Outer Ear] : the ears and nose were normal in appearance [Oropharynx] : the oropharynx was normal [Neck Appearance] : the appearance of the neck was normal [Neck Cervical Mass (___cm)] : no neck mass was observed [Jugular Venous Distention Increased] : there was no jugular-venous distention [Thyroid Diffuse Enlargement] : the thyroid was not enlarged [Thyroid Nodule] : there were no palpable thyroid nodules [Auscultation Breath Sounds / Voice Sounds] : lungs were clear to auscultation bilaterally [Heart Rate And Rhythm] : heart rate was normal and rhythm regular [Heart Sounds Gallop] : no gallops [Heart Sounds] : normal S1 and S2 [Murmurs] : no murmurs [Heart Sounds Pericardial Friction Rub] : no pericardial rub [Bowel Sounds] : normal bowel sounds [Abdomen Soft] : soft [Abdomen Tenderness] : non-tender [Abdomen Mass (___ Cm)] : no abdominal mass palpated [] : no rash [Cranial Nerves] : cranial nerves 2-12 were intact [Deep Tendon Reflexes (DTR)] : deep tendon reflexes were 2+ and symmetric [No Focal Deficits] : no focal deficits [Sensation] : the sensory exam was normal to light touch and pinprick [Oriented To Time, Place, And Person] : oriented to person, place, and time [Impaired Insight] : insight and judgment were intact [Affect] : the affect was normal [FreeTextEntry1] : Well-healed surgical scar from liver transplantation.

## 2022-07-17 NOTE — HISTORY OF PRESENT ILLNESS
[FreeTextEntry1] : Mr. 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  His other medical problems include diabetes mellitus type 2, hyperlipidemia, obesity, HFpEF with mild LV diastolic dysfunction, MGUS, chronic anemia, obstructive sleep apnea.\par \par His post transplantation course was complicated with VRE bacteremia that was resolved on antibiotics.  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  His immunosuppression was ultimately switched to everolimus with improvement of his mental status and tremors.  He also has a nonhealing right foot ulcer that took a long time to resolve.  His course was also further complicated with acute kidney injury on the top of chronic kidney disease that required some brief period of dialysis and prolonged hospitalization.\par \par Recent interval hospitalization from June 16, 2022 through June 29, 2022.  He was initially admitted at [facility] status post fall and also was found to have a ureteral stone on Kristi 15, 2022 and was transferred to [facility] in visiting hospital for further management.  A CT scan of the abdomen and pelvis revealed acute nondisplaced left seventh anterior rib fracture.  Small area of linear atelectasis versus infiltrate in the posterior left upper lobe.  5 mm distal left ureteral calculus causing hydronephrosis.  Mild bladder wall thickening which appeared more prominent on the left was noted.  Urologist examined the patient and recommended Flomax and conservative management.  Subsequent ultrasound of the kidney revealed mild left hydronephrosis.  No calculus was noted in the visualized ureter.  He was also noted to have orthostatic hypotension and his Flomax was discontinued at the time of hospital discharge.  He was transitioned initially to a subacute rehab facility where he is currently recuperating.  He denies any dizziness or fall episodes in the interval.\par \par He had an MRI of the cervical spine on January 17, 2022.  This revealed multilevel cervical spondylosis with variable degrees of canal and foraminal narrowing.  Thoracolumbar MRI also revealed degenerative changes at multiple levels but mostly from L1-L5 and L5/S1 as well as T12/L1.\par \par Today on his follow-up visit he reports doing well.  His main complaint today is increased frequency of urination, hesitancy during urination as well as increased straining.  He denies any fever, chills, rigor.  He does report mild dysuria at the end of urination. Currently remains on everolimus 3 mg twice daily (his dose was reduced during his recent hospitalization from 4 mg twice daily), CellCept 250 mg twice daily (dose was reduced from 500 mg twice a day day during his recent hospitalization due to GI symptoms) and his prednisone was discontinued (he was on prednisone 5 mg daily).\par \par With regards to his management of diabetes he is currently on insulin glargine 14 units subcutaneously once daily at bedtime and Admelog 10 units subcutaneously 3 times a day before meals with additional sliding scale coverage.  He also remains on sertraline 100 mg daily, tramadol 50 mg half tablet every 12 hours as needed, pantoprazole 40 mg daily.\par \par Recent labs from July 4, 2022 which revealed a white cell count of 3.39, hemoglobin 10.3 g/dL, platelet count 247,000.  Serum sodium was 137, potassium 4.0, creatinine 2.17 mg/dL.  Liver test from June 29, 2022 revealed total bilirubin 0.2 mg/dL, AST 31, ALT 86, alkaline phosphatase 144 units/L.  His CMV PCR from June 28, 2022 is now positive.  CMV prophylaxis was discontinued during his recent hospitalization.

## 2022-07-17 NOTE — REVIEW OF SYSTEMS
[Negative] : Psychiatric [As Noted in HPI] : as noted in HPI [Hesitancy] : urinary hesitancy [Dysuria] : dysuria

## 2022-08-02 NOTE — ED ADULT TRIAGE NOTE - ESI TRIAGE ACUITY LEVEL, MLM
3 Birth Control Pills Counseling: Birth Control Pill Counseling: I discussed with the patient the potential side effects of OCPs including but not limited to increased risk of stroke, heart attack, thrombophlebitis, deep venous thrombosis, hepatic adenomas, breast changes, GI upset, headaches, and depression.  The patient verbalized understanding of the proper use and possible adverse effects of OCPs. All of the patient's questions and concerns were addressed.

## 2022-08-03 PROBLEM — I82.412 LEFT FEMORAL VEIN DVT: Status: ACTIVE | Noted: 2022-02-08

## 2022-08-03 PROBLEM — D61.9 HYPOPROLIFERATIVE ANEMIA: Status: ACTIVE | Noted: 2022-04-25

## 2022-08-03 NOTE — ASSESSMENT
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 Pt's Hb has been in the range of 8-9 since Jan 2021 on record. He has received multiple transfusions in the past. Taking iron supplement. Pt also has CKD4 following up with nephrology. Denies any blood in stool. Colonoscopy  a year a go was reportedly normal. Pt feels fatigue. Denies HA. CP, SOB, abd pain, constipation, diarrhea, melena, hematuria, dysuria. \par \par \par # anemia\par -multifactorial in the setting of CKD, BM supression, anticoagulation for DVT\par -colonoscopy 2018 showed hemorrhoids, gastritis\par -B12, folate, iron WNL. \par -Erythropoietin slightly elevated. Retic index 1.35 showed hypoproliferation likely due to cellcept and everolimus pt is taking for liver transplant\par -discussed with Dr. Medina (hepatology)> cellcept dosage lowered\par -On epogen injection. (82308 units subQ every 2 weeks) with improvement Hb to 9-10\par -continue iron supplement\par \par \par # MGUS\par -continue to monitor\par -will repeat immunoelectrophoresis next visit\par \par \par # DVT\par -LLE DVT on Eliquis \par \par \par f/u in 8 weeks

## 2022-08-03 NOTE — HISTORY OF PRESENT ILLNESS
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 Pt's Hb has been in the range of 8-9 since Jan 2021 on record. He has received multiple transfusions in the past. Taking iron supplement. Pt also has CKD4 following up with nephrology. Denies any blood in stool. Colonoscopy  a year a go was reportedly normal. Pt feels fatigue. Denies HA. CP, SOB, abd pain, constipation, diarrhea, melena, hematuria, dysuria.  [de-identified] : 4/25/22: Prashant is here for follow up accompanied by his son. He complains of feeling fatigue. Sleeps up to 12 hrs a day. B12, folate, iron WNL. Erythropoetin slightly elevated. Retic index 1.35 showed hypoproliferation likely due to everolimus and cellcept for liver transplant. Pt may benefit from epo injection. Will d/w Dr. Medina. Denies HA. CP, SOB, abd pain, constipation, diarrhea, melena, hematuria, dysuria. \par \par 8/3/22: Prashant is here for follow up accompanied by his son. He complains of feeling fatigue. pt had recent hospitalization due to fall, had left rib fx. Also passed a kidney stone. Cellcept dosage was lowered 2/2 BM suppression. He has been getting epogen injection every 2 weeks with improvement in Hb to 9-10 from 7-8.

## 2022-08-03 NOTE — PHYSICAL EXAM
[Ambulatory and capable of all self care but unable to carry out any work activities] : Status 2- Ambulatory and capable of all self care but unable to carry out any work activities. Up and about more than 50% of waking hours [Normal] : affect appropriate [de-identified] : b/l LE edema 2+ [de-identified] : ecchymosis of b/l forearms

## 2022-08-09 NOTE — PROGRESS NOTE ADULT - SUBJECTIVE AND OBJECTIVE BOX
Pt seen, no complaints    MEDICATIONS  (STANDING):  dextrose 5%. 1000 milliLiter(s) (50 mL/Hr) IV Continuous <Continuous>  dextrose 50% Injectable 12.5 Gram(s) IV Push once  dextrose 50% Injectable 25 Gram(s) IV Push once  dextrose 50% Injectable 25 Gram(s) IV Push once  enoxaparin Injectable 40 milliGRAM(s) SubCutaneous daily  ertapenem  IVPB 1000 milliGRAM(s) IV Intermittent every 24 hours  folic acid 1 milliGRAM(s) Oral daily  furosemide    Tablet 40 milliGRAM(s) Oral daily  insulin glargine Injectable (LANTUS) 12 Unit(s) SubCutaneous at bedtime  insulin lispro (HumaLOG) corrective regimen sliding scale   SubCutaneous three times a day before meals  insulin lispro (HumaLOG) corrective regimen sliding scale   SubCutaneous at bedtime  insulin lispro Injectable (HumaLOG) 4 Unit(s) SubCutaneous three times a day before meals  lactulose Syrup 20 Gram(s) Oral three times a day  midodrine 20 milliGRAM(s) Oral every 8 hours  multivitamin 1 Tablet(s) Oral daily  pantoprazole    Tablet 40 milliGRAM(s) Oral two times a day  rifAXIMin 550 milliGRAM(s) Oral two times a day  spironolactone 25 milliGRAM(s) Oral daily    MEDICATIONS  (PRN):  dextrose 40% Gel 15 Gram(s) Oral once PRN Blood Glucose LESS THAN 70 milliGRAM(s)/deciliter  glucagon  Injectable 1 milliGRAM(s) IntraMuscular once PRN Glucose LESS THAN 70 milligrams/deciliter      ROS  No fever, sweats, chills  No epistaxis, HA, sore throat  No CP, SOB, cough, sputum  No n/v/d, abd pain, melena, hematochezia  No edema  No rash  No anxiety  No back pain, joint pain  No bleeding, bruising  No dysuria, hematuria    Vital Signs Last 24 Hrs  T(C): 36.8 (15 Feb 2020 12:50), Max: 37.1 (14 Feb 2020 20:53)  T(F): 98.3 (15 Feb 2020 12:50), Max: 98.8 (14 Feb 2020 20:53)  HR: 71 (15 Feb 2020 12:50) (71 - 78)  BP: 110/58 (15 Feb 2020 12:50) (110/58 - 131/64)  BP(mean): --  RR: 17 (15 Feb 2020 12:50) (17 - 18)  SpO2: 96% (15 Feb 2020 12:50) (96% - 97%)    PE  NAD  Awake, alert  RRR  CTAB  Abd soft, NT, ND  No edema  No rash grossly  FROM                          8.8    9.36  )-----------( 93       ( 15 Feb 2020 06:39 )             26.0       02-15    133<L>  |  97  |  14  ----------------------------<  107<H>  3.7   |  27  |  0.95    Ca    9.3      15 Feb 2020 06:39    TPro  5.8<L>  /  Alb  2.8<L>  /  TBili  6.7<H>  /  DBili  x   /  AST  22  /  ALT  13  /  AlkPhos  99  02-15 LABS:                        13.1   29.34 )-----------( 114      ( 08 Aug 2022 17:17 )             40.0     08-08    137  |  99  |  22  ----------------------------<  91  3.8   |  27  |  0.59    Ca    8.8      08 Aug 2022 17:17  Mg     2.00     -    TPro  6.7  /  Alb  3.8  /  TBili  0.9  /  DBili  x   /  AST  25  /  ALT  27  /  AlkPhos  90  08          Urinalysis Basic - ( 08 Aug 2022 18:32 )    Color: Yellow / Appearance: Slightly Turbid / S.017 / pH: x  Gluc: x / Ketone: Negative  / Bili: Negative / Urobili: <2 mg/dL   Blood: x / Protein: Trace / Nitrite: Negative   Leuk Esterase: Large / RBC: 7 /HPF / WBC 31 /HPF   Sq Epi: x / Non Sq Epi: 20 /HPF / Bacteria: Few            RADIOLOGY & ADDITIONAL TESTS:  New Results Reviewed Today:   New Imaging Personally Reviewed Today:  New Electrocardiogram Personally Reviewed Today:  Prior or Outpatient Records Reviewed Today:    COMMUNICATION:  Care Discussed with Consultants/Other Providers and Details of Discussion:  Discussions with Patient/Family:  PCP Communication: LABS:                        13.1   29.34 )-----------( 114      ( 08 Aug 2022 17:17 )             40.0     08-08    137  |  99  |  22  ----------------------------<  91  3.8   |  27  |  0.59    Ca    8.8      08 Aug 2022 17:17  Mg     2.00     -    TPro  6.7  /  Alb  3.8  /  TBili  0.9  /  DBili  x   /  AST  25  /  ALT  27  /  AlkPhos  90            Urinalysis Basic - ( 08 Aug 2022 18:32 )    Color: Yellow / Appearance: Slightly Turbid / S.017 / pH: x  Gluc: x / Ketone: Negative  / Bili: Negative / Urobili: <2 mg/dL   Blood: x / Protein: Trace / Nitrite: Negative   Leuk Esterase: Large / RBC: 7 /HPF / WBC 31 /HPF   Sq Epi: x / Non Sq Epi: 20 /HPF / Bacteria: Few        RADIOLOGY & ADDITIONAL TESTS:  New Results Reviewed Today: CBC, CMP, UA, troponin  New Imaging Personally Reviewed Today: CXR  New Electrocardiogram Personally Reviewed Today: NSR, Q waves V2-V3, III, aVF  Prior or Outpatient Records Reviewed Today: from [facility] [place] [facility] - gyn surgery notes    COMMUNICATION:  Discussions with Patient/Family: discussed plan for MRI and TTE with family

## 2022-08-09 NOTE — ASSESSMENT
[FreeTextEntry1] : Pt is 66 yo who presents w/ right heel wound\par -pt was seen and examined\par -Right foot heel wound sharply debrided using #15 blade down to the level of subQ but not beyond\par -Rx Sherron Ag\par -Instructed to apply Sherron daily to wound bed followed DSD\par \par Plan for skin substitute next visit.\par Type: apligraf\par Wound has shown improvement through Increased granulation tissue, decrease in wound size:\par Wound is free from infection, drainage and necrotic tissue:\par Patient has adequate blood supply as demonstrated by: bleeding \par Patient is on a comprehensive diabetic management program\par HgbA1c results on 12/14/2020: 7.6%\par Offloading measures (Surgical shoe)\par Wound has been treated with standards of care for how many weeks 4\par -Graft application next visit pending insurance approval/ apligraf\par -RTC 2 weeks Anesthesia Volume In Cc: 0.5

## 2022-08-18 NOTE — ED ADULT NURSE NOTE - NS ED NURSE DISCH DISPOSITION
[de-identified] : Patient is well nourished, well-developed, in no acute distress, with appropriate mood and affect. The patient is oriented to time, place, and person. Respirations are even and unlabored. Gait evaluation does reveal a limp. There is no inguinal adenopathy. Examination of the contralateral knee shows normal range of motion, strength, no tenderness, and intact skin. The affected limb is well-perfused, without skin lesions, shows a grossly normal motor and sensory examination. Knee motion is significantly reduced and does cause significant pain. The knee moves from 0 to 125 degrees. The knee is stable within that range-of-motion to AP and ML stress. The alignment of the knee is 5 degrees varus. Muscle strength is normal. Pedal pulses are palpable. Hip examination was negative.\par  [de-identified] : Long standing knee, AP knee, lateral knee, and patellar views of the left knee were ordered and taken in the office and demonstrate degenerative joint disease of the knee with joint space narrowing, osteophyte formation, and subchondral sclerosis. Admitted

## 2022-08-27 NOTE — PROVIDER CONTACT NOTE (CRITICAL VALUE NOTIFICATION) - ACTION/TREATMENT ORDERED:
no new orders at this time
will monitor orders
Pt is being followed by ID will monitor orders
1 unit of PRBC ordered.
pending transfer to SICU
No

## 2022-08-29 NOTE — ED ADULT TRIAGE NOTE - CHIEF COMPLAINT QUOTE
diabetic ulcer with cellulitis to the r. big toe x the passed 10 days- pt has hx of DM, neuropathy and is liver transplant recipient 2 years ago

## 2022-08-30 NOTE — CONSULT NOTE ADULT - SUBJECTIVE AND OBJECTIVE BOX
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    Patient has history of LT in July 2020 due to JEAN cirrhosis.  Post-LT course c/b VRE bacteremia, CNI toxicity to both tacrolimus and cyclosporine, and is currently maintained on everolimus and CellCept.  Patient endorses recent trauma ~1 week PTA where toenail was "sheared off" from getting tangled in the bedsheet.  Subsequently, he noticed worsening swelling and erythema of the right great toe in the setting of known diabetic neuropathy. He denies any overt drainage or purulence.  Additionally, he endorses intermittent watery bowel movements over the past 1 week.    ROS:   General:  No fevers, chills, night sweats, fatigue  Eyes:  Good vision, no reported pain  ENT:  No sore throat, pain, runny nose  CV:  No pain, palpitations  Pulm:  No dyspnea, cough  GI:  See HPI, otherwise negative  :  No  incontinence, nocturia  Muscle:  No pain, weakness  Neuro:  No memory problems  Psych:  No insomnia, mood problems, depression  Endocrine:  No polyuria, polydipsia, cold/heat intolerance  Heme:  No petechiae, ecchymosis, easy bruisability  Skin:  No rash    PMHX/PSHX:    Diabetes    Hypercholesteremia    Neuropathy    Hypertension    Renal stones    Fatty liver disease, nonalcoholic    Obesity    Hepatic encephalopathy    GERD with esophagitis    GIB (gastrointestinal bleeding)      Allergies:  codeine (Anaphylaxis)      Hospital Medications:  acetaminophen     Tablet .. 650 milliGRAM(s) Oral every 6 hours PRN  aluminum hydroxide/magnesium hydroxide/simethicone Suspension 30 milliLiter(s) Oral every 4 hours PRN  cefepime   IVPB 2000 milliGRAM(s) IV Intermittent once  cefepime   IVPB      chlorhexidine 2% Cloths 1 Application(s) Topical daily  dextrose 5%. 1000 milliLiter(s) (50 mL/Hr) IV Continuous <Continuous>  dextrose 5%. 1000 milliLiter(s) (100 mL/Hr) IV Continuous <Continuous>  dextrose 50% Injectable 25 Gram(s) IV Push once  dextrose 50% Injectable 12.5 Gram(s) IV Push once  dextrose 50% Injectable 25 Gram(s) IV Push once  dextrose Oral Gel 15 Gram(s) Oral once PRN  everolimus (ZORTRESS) 3 milliGRAM(s) Oral two times a day  folic acid 1 milliGRAM(s) Oral daily  glucagon  Injectable 1 milliGRAM(s) IntraMuscular once  heparin   Injectable 5000 Unit(s) SubCutaneous every 8 hours  insulin glargine Injectable (LANTUS) 14 Unit(s) SubCutaneous at bedtime  insulin lispro (ADMELOG) corrective regimen sliding scale   SubCutaneous three times a day before meals  insulin lispro (ADMELOG) corrective regimen sliding scale   SubCutaneous at bedtime  insulin lispro Injectable (ADMELOG) 10 Unit(s) SubCutaneous three times a day before meals  melatonin 3 milliGRAM(s) Oral at bedtime PRN  mycophenolate mofetil 250 milliGRAM(s) Oral two times a day  ondansetron Injectable 4 milliGRAM(s) IV Push every 8 hours PRN  pantoprazole    Tablet 40 milliGRAM(s) Oral before breakfast  sertraline 100 milliGRAM(s) Oral daily    MAR over past 24 hours:    insulin lispro (ADMELOG) corrective regimen sliding scale   1 Unit(s) SubCutaneous (08-30-22 @ 14:19)    insulin lispro Injectable (ADMELOG)   10 Unit(s) SubCutaneous (08-30-22 @ 14:17)    lidocaine 1% Injectable   10 milliLiter(s) Local Injection (08-30-22 @ 09:00)    piperacillin/tazobactam IVPB...   200 mL/Hr IV Intermittent (08-30-22 @ 05:24)    vancomycin  IVPB   250 mL/Hr IV Intermittent (08-30-22 @ 06:59)      Home Medications:  Last Order Reconciliation Date: 08-30-22 @ 12:23 (Admission Reconciliation)  Admelog 100 units/mL injectable solution: 10 unit(s) subcutaneous 3 times a day (before meals) with additional sliding scale coverage  everolimus 1 mg oral tablet: 3 milligram(s) orally 2 times a day    at 8AM and 8PM  folic acid 1 mg oral tablet: 1 tab(s) orally once a day  insulin glargine 100 units/mL subcutaneous solution: 14 unit(s) subcutaneous once a day (at bedtime)  mycophenolate mofetil 250 mg oral capsule: 1 cap(s) orally 2 times a day  omega-3 polyunsaturated fatty acids ethyl esters 1000 mg oral capsule: 1 cap(s) orally 2 times a day  pantoprazole 40 mg oral delayed release tablet: 1 tab(s) orally once a day (before a meal)  sertraline 100 mg oral tablet: 1 tab(s) orally once a day      Social History:   Tobacco: denies  Alcohol: denies  Recreational drugs: denies    Family history:    No pertinent family history in first degree relatives    Family history of stomach cancer    Family history of hypertension    Family history of type 2 diabetes mellitus        PHYSICAL EXAM:   Vital Signs last 24 hours:  T(F): 97.9 (08-30-22 @ 13:00), Max: 98.4 (08-30-22 @ 01:30)  HR: 76 (08-30-22 @ 13:00) (76 - 86)  BP: 134/73 (08-30-22 @ 13:00) (111/69 - 145/72)  BP(mean): --  ABP: --  ABP(mean): --  RR: 15 (08-30-22 @ 13:00) (15 - 18)  SpO2: 99% (08-30-22 @ 13:00) (96% - 99%)    I&Os:    BMI (kg/m2): 30.3 (08-29-22 @ 19:51)  GENERAL: no acute distress  NEURO: alert  HEENT: NCAT, no conjunctival pallor appreciated  CHEST: no respiratory distress, no accessory muscle use  CARDIAC: regular rate, +S1/S2  ABDOMEN: soft, nontender, no rebound or guarding  EXTREMITIES: warm, well perfused  SKIN: no lesions noted    DIAGNOSTICS:  WBC      Hg       PLT      Na       K        CO2     BUN      Cr       ALT      AST      TB       ALP  5.25     8.9      220      136      3.7      17       42       2.34     ------   ------   ------   ------   08-30-22 @ 04:09  4.2      10.2     193      ------   ------   ------   ------   ------   ------   ------   ------   ------   08-17-22 @ 15:05  3.7      9.9      220      ------   ------   ------   ------   ------   ------   ------   ------   ------   08-03-22 @ 15:58  4.5      9.4      190      ------   ------   ------   ------   ------   ------   ------   ------   ------   07-19-22 @ 14:02  ------   ------   ------   139      4.6      21       45       2.22     86       31       0.2      144      06-29-22 @ 06:25    PT             INR            MELDwith  MELDw/o  15.6           1.35           --             --             06-16-22 @ 09:03  16.5           1.41           --             --             06-15-22 @ 16:37  11.8           0.98           --             --             01-04-22 @ 07:24  11.4           0.95           --             --             01-03-22 @ 07:11  11.8           0.98           --             --             01-02-22 @ 04:57

## 2022-08-30 NOTE — PROGRESS NOTE ADULT - SUBJECTIVE AND OBJECTIVE BOX
Patient is a 66y old  Male who presents with a chief complaint of R toe infection (30 Aug 2022 14:45)       INTERVAL HPI/OVERNIGHT EVENTS:  Patient seen and evaluated at bedside.  Pt is resting comfortable in NAD. Denies N/V/F/C.  Pain rated at X/10    Allergies    codeine (Anaphylaxis)    Intolerances        Vital Signs Last 24 Hrs  T(C): 36.6 (30 Aug 2022 13:00), Max: 36.9 (30 Aug 2022 01:30)  T(F): 97.9 (30 Aug 2022 13:00), Max: 98.4 (30 Aug 2022 01:30)  HR: 76 (30 Aug 2022 13:00) (76 - 86)  BP: 134/73 (30 Aug 2022 13:00) (111/69 - 145/72)  BP(mean): --  RR: 15 (30 Aug 2022 13:00) (15 - 18)  SpO2: 99% (30 Aug 2022 13:00) (96% - 99%)    Parameters below as of 30 Aug 2022 13:00  Patient On (Oxygen Delivery Method): room air        LABS:                        8.9    5.25  )-----------( 220      ( 30 Aug 2022 04:09 )             28.5     08-30    136  |  105  |  42<H>  ----------------------------<  205<H>  3.7   |  17<L>  |  2.34<H>    Ca    8.7      30 Aug 2022 04:09          CAPILLARY BLOOD GLUCOSE      POCT Blood Glucose.: 175 mg/dL (30 Aug 2022 13:50)      Lower Extremity Physical Exam:    Vascular: DP/PT 2/4, B/L, CFT <3seconds B/L, Temperature gradient warm to cool, B/L.   Neuro: Epicritic sensation diminished to the level of toes, B/L.  Musculoskeletal/Ortho: unremarkable  Skin: right foot: distal hallux wound to bone w/ fibrotic base, malodor, erythema extending from hallux to forefoot, no soft tissue crepitations, no drainage    RADIOLOGY & ADDITIONAL TESTS:

## 2022-08-30 NOTE — ED PROVIDER NOTE - ATTENDING CONTRIBUTION TO CARE
Afebrile. Awake and Alert. Lungs CTA. CN II-XII grossly intact. Moves all extremities without lateralization. Right first toe: un-stageable ulcer toe tip with eschar lateral with upstream swelling and erythema.    DM/renal tx with ulcer toe r/o OM, cellulitis  Podiatry and admit IV abx

## 2022-08-30 NOTE — H&P ADULT - PROBLEM SELECTOR PLAN 3
h/o JEAN cirrhosis s/p transplant 2020  on everolimus and cellcept  - will c/w both for now  -ID and transplant hepatology consulted, pending recs

## 2022-08-30 NOTE — H&P ADULT - ASSESSMENT
66M with PMH JEAN cirrhosis s/p liver transplant 7/2/20 (on everolimus/cellcept), HLD, T2DM on insulin with neuropathy, obesity, HFpEF, recent admission in 6/2022 for kidney stone (passed without intervention) now p/w R toe swelling and erythema x 5 days, c/f OM; also with one episode of watery diarrhea.

## 2022-08-30 NOTE — H&P ADULT - PROBLEM SELECTOR PLAN 2
1 week of on/off loose stools, now with 1 episode of watery diarrhea in ED; in setting of immunosuppression for liver tx   - will check c diff, GI PCR, stool cx  - f/u ID regarding further infectious work-up given immunosuppression

## 2022-08-30 NOTE — CONSULT NOTE ADULT - ASSESSMENT
66Y male w right big toe wound probing to bone  -Patient seen and evaluated,  - Patient is afebrile, no leukocytosis  - right foot: erythema localized to the right big toe, mild swelling noted plantarly and extending to the dorsum of the big toe. Dry blood noted underneath nail with medial corner necrosis   - right big toenail removal:  Local anesthesia was achieved about the right big toe with 1% lidocaine plain in a digital block manner. total nail removal was achieved using the suture removal kit. incision and drainage of the nail bed was achieved with 15 blade wound probing to bone and not beyond. 1 cc of pus expressed.  -right foot xray shows bony erosion of the tuft of the first distal phalanx, no OM  -ordered right foot MRI  -Please admit to medicine  - recs vanco/cefepime  -Discussed with attending   66Y male w right big toe wound probing to bone  -Patient seen and evaluated,  - Patient is afebrile, no leukocytosis  - right foot: erythema localized to the right big toe, mild swelling noted plantarly and extending to the dorsum of the big toe. Dry blood noted underneath nail with medial corner necrosis   - right big toenail removal:  Local anesthesia was achieved about the right big toe with 1% lidocaine plain in a digital block manner. total nail removal was achieved using the suture removal kit. incision and drainage of the nail bed was achieved with 15 blade wound probing to bone and not beyond. 1 cc of pus expressed.  -right foot xray shows bony erosion of the tuft of the first distal phalanx, no OM  -ordered right foot MRI  -Please admit to medicine  - recs vanco/cefepime  - Thorough  conversation with patient and patient'sdaughter that the best treatment for osteomyelitis is amputation, Pt is adamantly against surgical procedure and opted for long term abX. Patient explained the outcomes and patient understood.  -Rec ID consult,  -Discussed with attending   66Y male w right big toe wound probing to bone  -Patient seen and evaluated,  - Patient is afebrile, no leukocytosis  - right foot: erythema localized to the right big toe, mild swelling noted plantarly and extending to the dorsum of the big toe. Dry blood noted underneath nail with medial corner necrosis   - right big toenail removal:  Local anesthesia was achieved about the right big toe with 1% lidocaine plain in a digital block manner. total nail removal was achieved using the suture removal kit. incision and drainage of the nail bed was achieved with 15 blade wound probing to bone and not beyond. 1 cc of pus expressed.  -right foot xray shows bony erosion of the tuft of the first distal phalanx, no gas  -ordered right foot MRI  -Please admit to medicine  - recs vanco/cefepime  - Thorough  conversation with patient and patient's daughter that the best treatment for osteomyelitis is amputation, Pt is adamantly against surgical procedure and opted for long term abX. Patient explained the outcomes and patient understood.  -Rec ID consult,  -Discussed with attending

## 2022-08-30 NOTE — H&P ADULT - NSHPSOCIALHISTORY_GEN_ALL_CORE
no smoking, no etoh use in past 5 years, no drugs   lives at home with 3 of his children  ambulates with walker at baseline

## 2022-08-30 NOTE — ED PROVIDER NOTE - CLINICAL SUMMARY MEDICAL DECISION MAKING FREE TEXT BOX
Patient presented w/ R foot wound appears to have developed cellulitis. Initial work up including Xray ordered, podiatry will be consulted.

## 2022-08-30 NOTE — ED ADULT NURSE REASSESSMENT NOTE - NS ED NURSE REASSESS COMMENT FT1
pt found laying in bed, in no visible distress. denies pain. denies sob. iv in right hand remains patent, dressing dry, clean and intact.

## 2022-08-30 NOTE — ED ADULT NURSE REASSESSMENT NOTE - NS ED NURSE REASSESS COMMENT FT1
pt had a large watery stool. called admitting team ZBIGNIEW delgado. was notified an order for a stool sample would be put in. will send when order is placed.

## 2022-08-30 NOTE — H&P ADULT - NSHPPHYSICALEXAM_GEN_ALL_CORE
Vital Signs Last 24 Hrs  T(C): 36.6 (30 Aug 2022 09:01), Max: 36.9 (30 Aug 2022 01:30)  T(F): 97.9 (30 Aug 2022 09:01), Max: 98.4 (30 Aug 2022 01:30)  HR: 86 (30 Aug 2022 09:01) (82 - 86)  BP: 123/59 (30 Aug 2022 09:01) (111/69 - 145/72)  BP(mean): --  RR: 15 (30 Aug 2022 09:01) (15 - 18)  SpO2: 96% (30 Aug 2022 09:01) (96% - 98%)    Parameters below as of 30 Aug 2022 09:01  Patient On (Oxygen Delivery Method): room air    PHYSICAL EXAM:  GENERAL: NAD, well-developed  HEAD:  Atraumatic, normocephalic  EYES: EOMI, conjunctiva and sclera clear  NECK: Supple, no JVD  CHEST/LUNG: Clear to auscultation bilaterally; no wheezing or rales  HEART: Regular rate and rhythm; +holosystolic murmur, + 2+ b/l LE edema R>L  ABDOMEN: Soft, nontender, nondistended; bowel sounds present  EXTREMITIES:  2+ Peripheral Pulses, no clubbing, cyanosis; R first toe swollen +erythema   PSYCH: AAOx3, calm affect, not anxious  NEUROLOGY: non-focal, moving all extremities equally, sensation grossly intact   SKIN: R 1st toe erythema, no rash noted   MUSCULOSKELETAL: no joint swelling or tenderness

## 2022-08-30 NOTE — CONSULT NOTE ADULT - SUBJECTIVE AND OBJECTIVE BOX
Patient is a 66y old  Male who presents with a chief complaint of     HPI: 5yo M w/ pmhx of IDDM, HLD, liver transplant, CKD presented to the ED w/ 10 day history of R toe wound. The patient reported that he got tangled on the bed sheets and because of that his toe nail broke. At the moment he saw minimal amount of blood in the nail; due to severe neuropathy he has no sensation in his feet. He did not feel changes in the next days and 4 days prior to presentation his son alerted him of changes in his foot. He visited his Podiatry the day of presentation who instructed him to visited the ED immediately. The patient denied fevers, chills, pain, drainage although he had not been inspecting his feet.      PAST MEDICAL & SURGICAL HISTORY:  Diabetes      Hypercholesteremia      Neuropathy      Hypertension      Renal stones  25 years ago      Fatty liver disease, nonalcoholic      Obesity      Hepatic encephalopathy      GERD with esophagitis  Gastritis &amp; Non Bleeding Ulcers      GIB (gastrointestinal bleeding)      S/P cholecystectomy          MEDICATIONS  (STANDING):  lidocaine 1% Injectable 10 milliLiter(s) Local Injection once    MEDICATIONS  (PRN):      Allergies    codeine (Anaphylaxis)    Intolerances        VITALS:    Vital Signs Last 24 Hrs  T(C): 36.7 (30 Aug 2022 05:00), Max: 36.9 (30 Aug 2022 01:30)  T(F): 98.1 (30 Aug 2022 05:00), Max: 98.4 (30 Aug 2022 01:30)  HR: 82 (30 Aug 2022 05:00) (82 - 83)  BP: 145/72 (30 Aug 2022 05:00) (111/69 - 145/72)  BP(mean): --  RR: 16 (30 Aug 2022 05:00) (16 - 18)  SpO2: 97% (30 Aug 2022 05:00) (97% - 98%)    Parameters below as of 30 Aug 2022 05:00  Patient On (Oxygen Delivery Method): room air        LABS:                          8.9    5.25  )-----------( 220      ( 30 Aug 2022 04:09 )             28.5       08-30    136  |  105  |  42<H>  ----------------------------<  205<H>  3.7   |  17<L>  |  2.34<H>    Ca    8.7      30 Aug 2022 04:09        CAPILLARY BLOOD GLUCOSE              LOWER EXTREMITY PHYSICAL EXAM:    Vascular: DP/PT 2/4, B/L, CFT <3seconds B/L, Temperature gradient warm to cool, B/L.   Neuro: Epicritic sensation diminished to the level of toes, B/L.  Musculoskeletal/Ortho: unremarkable  Skin: right foot: erythema localized to the right big toe, mild swelling noted plantarly and extending to the dorsum of the big toe. Dry blood noted underneath nail with medial corner necrosis.    RADIOLOGY & ADDITIONAL STUDIES:

## 2022-08-30 NOTE — H&P ADULT - NSICDXPASTMEDICALHX_GEN_ALL_CORE_FT
CM Note: introduced myself to mom and Rosanna Campuzano and explain the role of CM. Rosanna Campuzano is s/p appendectomy. He lives with his mom and 9year old. When asked if Rosanna Campuzano has insurance mom stated \" well he did not do his part\". Asked who is \"he\" and she stated Rosanna Campuzano. I explained that usually the parents applies for the [employer]. No response from mom. Referral left on Dorian Goldmann with [facility] voice mail. Outpatient Observation Bed Notice printed, given to family of Marly Brooke and signed copy placed on chart. Care Management Interventions  PCP Verified by CM:  Yes (Dr. Liset Little in February)  Mode of Transport at Discharge:  (family vehicle)  Transition of Care Consult (CM Consult): Discharge Planning  MyChart Signup: No  Discharge Durable Medical Equipment: No  Physical Therapy Consult: No  Occupational Therapy Consult: No  Speech Therapy Consult: No  Current Support Network: Lives with Caregiver  Confirm Follow Up Transport: Family  Plan discussed with Pt/Family/Caregiver: Yes  Freedom of Choice Offered:  (N/A) Wilton Skiff RN CRM PAST MEDICAL HISTORY:  Diabetes     Fatty liver disease, nonalcoholic     GERD with esophagitis Gastritis & Non Bleeding Ulcers    GIB (gastrointestinal bleeding)     Hepatic encephalopathy     Hypercholesteremia     Hypertension     Neuropathy     Obesity     Renal stones 25 years ago

## 2022-08-30 NOTE — PROGRESS NOTE ADULT - ASSESSMENT
67 y/o M w/ R hallux wound to level of bone  - Pt seen and evaluated  - Afebrile, no leukocytosis  - right foot: distal hallux wound to bone w/ fibrotic base, malodor, erythema extending from hallux to forefoot, no soft tissue crepitations, no drainage  - R foot XR: OM of distal hallux distal phalanx, no tracking soft tissue air  - R foot wound culture pending  - ID consult pending  - Risks and benefits of surgical resection of infected bone explained to pt in detail, pt continues to adamantly refuse, states that he understands long term IV abx are a suboptimal treatment with their own risks, however would still opt for this route of treatment  - R foot MR pending  - Pod plan likely LWC w/ IV abx, will plan for bone biopsy to help with long term abx course  - If pt were to change his mind, please document medical clearance for left foot 1st toe amputation  - Seen with attending 65 y/o M w/ R hallux wound to level of bone  - Pt seen and evaluated  - Afebrile, no leukocytosis  - right foot: distal hallux wound to bone w/ fibrotic base, malodor, erythema extending from hallux to forefoot, no soft tissue crepitations, no drainage  - R foot XR: OM of distal hallux distal phalanx, no tracking soft tissue air  - R foot wound culture pending  - ID consult pending  - Risks and benefits of surgical resection of infected bone explained to pt in detail, pt continues to adamantly refuse, states that he understands long term IV abx are a suboptimal treatment with their own risks, however would still opt for this route of treatment  - R foot MR pending  - Pod plan likely LWC w/ IV abx, will plan for bone biopsy to help with long term abx course  - If pt were to change his mind, please document medical clearance for right foot 1st toe amputation  - Seen with attending

## 2022-08-30 NOTE — CONSULT NOTE ADULT - ASSESSMENT
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  Patient has history of LT in July 2020 due to JEAN cirrhosis.  Post-LT course c/b VRE bacteremia, CNI toxicity to both tacrolimus and cyclosporine, and is currently maintained on everolimus and CellCept.  Patient endorses recent trauma ~1 week PTA where toenail was "sheared off" from getting tangled in the bedsheet.  Subsequently, he noticed worsening swelling and erythema of the right great toe in the setting of known diabetic neuropathy. He denies any overt drainage or purulence.  Additionally, he endorses intermittent watery bowel movements over the past 1 week.      # Right toe osteomyelitis  # Diarrhea  # JEAN cirrhosis s/p LT 7/2/2020: maintained on everolimus and CellCept, had elevated liver chemistreies in December 2021 s/p liver biopsy which was negative for evidence of rejection but revealed NRH  # Low level CMV viremia 6/28/2022  # CKD    Recommendations:  -trend clinical symptoms, exam findings, vital signs, CBC, CMP, INR  -Immunosuppression:       -Active/Maintenance:  everolimus (ZORTRESS) 3 milliGRAM(s) Oral two times a day, 08-30-22 @ 12:23  mycophenolate mofetil 250 milliGRAM(s) Oral two times a day, 08-30-22 @ 12:22       -Levels: Everolimus, Whole Blood Result: 8.9 ng/mL (06-29-22 @ 06:20)  -Antibiotics: cefepime   IVPB 2000 milliGRAM(s) IV  -appreciate recommendations from Transplant ID  -for watery diarrhea, check GI PCR, C diff, and CMV  -management of osteomyelitis and remainder per primary team    Note incomplete until finalized by attending signature/attestation.    Gallo Rodrgiuez  GI/Hepatology Fellow    MONDAY-FRIDAY 8AM-5PM:  Pager# [phone] ([facility]) or [phone] ([facility])    NON-URGENT CONSULTS:  Please email [email or web address]  AT NIGHT AND ON WEEKENDS:  Contact on-call GI fellow via answering service ([phone]) from 5pm-8am and on weekends/holidays   66 year old male with history of JEAN cirrhosis s/p OLT 7/2/20 (course c/b VRE bacteremia, CNI toxicity to both tacro and cyclosporine; switched to everolimus 7/27/20; low level CMV viremia 6/28/2022), HLD, IDDM, obesity, HFpEF, nephrolithiasis who presents with right toe infection.  Patient has history of LT in July 2020 due to JEAN cirrhosis.  Post-LT course c/b VRE bacteremia, CNI toxicity to both tacrolimus and cyclosporine, and is currently maintained on everolimus and CellCept.  Patient endorses recent trauma ~1 week PTA where toenail was "sheared off" from getting tangled in the bedsheet.  Subsequently, he noticed worsening swelling and erythema of the right great toe in the setting of known diabetic neuropathy. He denies any overt drainage or purulence.  Additionally, he endorses intermittent watery bowel movements over the past 1 week.    # Right toe osteomyelitis  # Diarrhea  # JEAN cirrhosis s/p LT 7/2/2020: maintained on everolimus and CellCept, had elevated liver chemistreies in December 2021 s/p liver biopsy which was negative for evidence of rejection but revealed NRH  # Low level CMV viremia 6/28/2022  # CKD    Recommendations:  -trend clinical symptoms, exam findings, vital signs, CBC, CMP, INR  -Immunosuppression:       -Active/Maintenance: on home everolimus 3mg BID and CellCept 250mg BID; hold CellCept for now; patient at risk for impaired wound healing with everolimus however limited treatment options given significant CNI neurotoxicity       -Levels: Everolimus, Whole Blood Result: 8.9 ng/mL (06-29-22 @ 06:20)  -Antibiotics: cefepime IVPB 2000 milliGRAM(s) IV  -appreciate recommendations from Transplant ID  -for watery diarrhea, check GI PCR, C diff, and CMV  -management of osteomyelitis and remainder per primary team    Note incomplete until finalized by attending signature/attestation.    Gallo Rodriguez  GI/Hepatology Fellow    MONDAY-FRIDAY 8AM-5PM:  Pager# [phone] ([facility]) or [phone] ([facility])    NON-URGENT CONSULTS:  Please email [email or web address]  AT NIGHT AND ON WEEKENDS:  Contact on-call GI fellow via answering service ([phone]) from 5pm-8am and on weekends/holidays

## 2022-08-30 NOTE — CONSULT NOTE ADULT - ASSESSMENT
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     COVID19 PCR (8/30) Negative.   ESR (8/30) 106.   CRP (8/30) 180.     XR Foot (8/30) Questionable bony erosion of the tuft of the first distal phalanx.    On 8/30 s/p removal of toenail with nail bed wound that extended to bone.     #Suspected RLE 1st digit OM  --Recommend Vancomycin 1.25g IV Q24H. Check trough prior to third dose. Target trough 10-15  --Recommend Cefepime 2g IV Q12H  --Continue to follow CBC with diff  --Continue to follow temperature curve  --Follow up on preliminary blood cultures  --Follow up on preliminary wound culture  --Follow up on RLE 1st digit MRI    #Liver Transplant Recipient, Recent CMV Viremia  --Recommend CMV PCR (ordered)    I will continue to follow. Please feel free to contact me with any further questions.    Eusebio Pérez M.D.  [facility] Division of Infectious Disease  8AM-5PM Monday - Friday: Available on [employer] Teams  After Hours and Holidays (or if no response on [employer] Teams): Please contact the Infectious Diseases Office at [phone]    The above assessment and plan were discussed with Dr Letitia Mo

## 2022-08-30 NOTE — H&P ADULT - PROBLEM SELECTOR PLAN 4
CKD stage 3b with baseline Cr ~2.3, remains stable and at baseline  monitor closely in setting of abx as above   trend Cr daily, renally dose medications, avoid nephrotoxins

## 2022-08-30 NOTE — CONSULT NOTE ADULT - SUBJECTIVE AND OBJECTIVE BOX
Patient is a 66y old  Male who presents with a chief complaint of R toe infection (30 Aug 2022 12:32)      HPI:  66M with PMH JEAN cirrhosis s/p liver transplant 7/2/20 (on everolimus/cellcept), HLD, T2DM on insulin with neuropathy, obesity, HFpEF, recent admission in 6/2022 for kidney stone, passed without intervention; now p/w R toe infection. Per pt, about 1 week ago, his feet got tangled in his bedsheet and part of the nails on b/l 1st toes  were sheared off; the next day, there was a small blood spot on the R toe. Over the next couple of days, pt did not notice any significant changes; on Thursday evening his son noted the R toe to look swollen and red. Pt denies any pain, but has severe b/l neuropathy in his feet and has very diminished sensation at baseline. Contacted his podiatrist over the weekend and had appt for Monday 8/29 - his podiatrist was c/f worsening cellulitis vs OM and pt sent to ED for further evaluation. Pt states the swelling and erythema of R toe has remains stable since Thursday 8/25, denies any drainage or open wounds, denies any pains; has baseline LE swelling that is also unchanged (improved if anything per pt), no fever, no chills, no n/v or other systemic symptoms.   Of note, pt endorsing one week of on/off loose stools, non bloody, no melena; in ED had one completely watery diarrheal movement - denies any abd pain.     COVID19 PCR (8/30) Negative  ESR (8/30) 106  CRP (8/30) 180    XR Foot (8/30) Questionable bony erosion of the tuft of the first distal phalanx.     prior hospital charts reviewed [  ]  primary team notes reviewed [  ]  other consultant notes reviewed [  ]    PAST MEDICAL & SURGICAL HISTORY:  Diabetes      Hypercholesteremia      Neuropathy      Hypertension      Renal stones  25 years ago      Fatty liver disease, nonalcoholic      Obesity      Hepatic encephalopathy      GERD with esophagitis  Gastritis &amp; Non Bleeding Ulcers      GIB (gastrointestinal bleeding)      S/P cholecystectomy          Allergies  codeine (Anaphylaxis)    ANTIMICROBIALS (past 90 days)  MEDICATIONS  (STANDING):    piperacillin/tazobactam IVPB...   200 mL/Hr IV Intermittent (08-30-22 @ 05:24)    vancomycin  IVPB   250 mL/Hr IV Intermittent (08-30-22 @ 06:59)      ANTIMICROBIALS:    piperacillin/tazobactam IVPB. 3.375 once  vancomycin  IVPB 1250 every 24 hours    OTHER MEDS: MEDICATIONS  (STANDING):  acetaminophen     Tablet .. 650 every 6 hours PRN  aluminum hydroxide/magnesium hydroxide/simethicone Suspension 30 every 4 hours PRN  dextrose 50% Injectable 25 once  dextrose 50% Injectable 12.5 once  dextrose 50% Injectable 25 once  dextrose Oral Gel 15 once PRN  everolimus (ZORTRESS) 3 two times a day  glucagon  Injectable 1 once  heparin   Injectable 5000 every 8 hours  insulin glargine Injectable (LANTUS) 14 at bedtime  insulin lispro (ADMELOG) corrective regimen sliding scale  three times a day before meals  insulin lispro (ADMELOG) corrective regimen sliding scale  at bedtime  insulin lispro Injectable (ADMELOG) 10 three times a day before meals  melatonin 3 at bedtime PRN  mycophenolate mofetil 250 two times a day  ondansetron Injectable 4 every 8 hours PRN  pantoprazole    Tablet 40 before breakfast  sertraline 100 daily    SOCIAL HISTORY:   hx smoking  non-smoker    FAMILY HISTORY:  Family history of stomach cancer    Family history of hypertension    Family history of type 2 diabetes mellitus      REVIEW OF SYSTEMS  [  ] ROS unobtainable because:    [  ] All other systems negative except as noted below:	    Constitutional:  [ ] fever [ ] chills  [ ] weight loss  [ ] weakness  Skin:  [ ] rash [ ] phlebitis	  Eyes: [ ] icterus [ ] pain  [ ] discharge	  ENMT: [ ] sore throat  [ ] thrush [ ] ulcers [ ] exudates  Respiratory: [ ] dyspnea [ ] hemoptysis [ ] cough [ ] sputum	  Cardiovascular:  [ ] chest pain [ ] palpitations [ ] edema	  Gastrointestinal:  [ ] nausea [ ] vomiting [ ] diarrhea [ ] constipation [ ] pain	  Genitourinary:  [ ] dysuria [ ] frequency [ ] hematuria [ ] discharge [ ] flank pain  [ ] incontinence  Musculoskeletal:  [ ] myalgias [ ] arthralgias [ ] arthritis  [ ] back pain  Neurological:  [ ] headache [ ] seizures  [ ] confusion/altered mental status  Psychiatric:  [ ] anxiety [ ] depression	  Hematology/Lymphatics:  [ ] lymphadenopathy  Endocrine:  [ ] adrenal [ ] thyroid  Allergic/Immunologic:	 [ ] transplant [ ] seasonal    Vital Signs Last 24 Hrs  T(F): 97.9 (08-30-22 @ 09:01), Max: 98.4 (08-30-22 @ 01:30)  Vital Signs Last 24 Hrs  HR: 86 (08-30-22 @ 09:01) (82 - 86)  BP: 123/59 (08-30-22 @ 09:01) (111/69 - 145/72)  RR: 15 (08-30-22 @ 09:01)  SpO2: 96% (08-30-22 @ 09:01) (96% - 98%)  Wt(kg): --    PHYSICAL EXAMINATION:  General: Alert and Awake, NAD  HEENT: PERRL, EOMI, No subconjunctival hemorrhages, Oropharynx Clear, MMM  Neck: Supple, No CHELSEA  Cardiac: RRR, No M/R/G  Resp: CTAB, No Wh/Rh/Ra  Abdomen: NBS, NT/ND, No HSM, No rigidity or guarding  MSK: No LE edema. No stigmata of IE. No evidence of phlebitis. No evidence of synovitis.  : No bob  Skin: No rashes or lesions. Skin is warm and dry to the touch.   Neuro: Alert and Awake. CN 2-12 Grossly intact. Moves all four extremities spontaneously.  Psych: Calm, Pleasant, Cooperative                          8.9    5.25  )-----------( 220      ( 30 Aug 2022 04:09 )             28.5     08-30    136  |  105  |  42<H>  ----------------------------<  205<H>  3.7   |  17<L>  |  2.34<H>    Ca    8.7      30 Aug 2022 04:09      MICROBIOLOGY:    COVID19 PCR (8/30) Negative    RADIOLOGY:    <The imaging below has been reviewed and visualized by me independently. Findings as detailed in report below>    EXAM:  XR FOOT COMP MIN 3 VIEWS RT                        PROCEDURE DATE:  08/30/2022    No acute fracture or dislocation.  Questionable bony erosion of the tuft of the first distal phalanx.   Findings are equivocal for osteomyelitis and an MRI of the forefoot may be obtained for more definitive evaluation.       Patient is a 66y old  Male who presents with a chief complaint of R toe infection (30 Aug 2022 12:32)      HPI:  66M with PMH JEAN cirrhosis s/p liver transplant 7/2/20 (on everolimus/cellcept), HLD, T2DM on insulin with neuropathy, obesity, HFpEF, recent admission in 6/2022 for kidney stone, passed without intervention; now p/w R toe infection. Per pt, about 1 week ago, his feet got tangled in his bedsheet and part of the nails on b/l 1st toes  were sheared off; the next day, there was a small blood spot on the R toe. Over the next couple of days, pt did not notice any significant changes; on Thursday evening his son noted the R toe to look swollen and red. Pt denies any pain, but has severe b/l neuropathy in his feet and has very diminished sensation at baseline. Contacted his podiatrist over the weekend and had appt for Monday 8/29 - his podiatrist was c/f worsening cellulitis vs OM and pt sent to ED for further evaluation. Pt states the swelling and erythema of R toe has remains stable since Thursday 8/25, denies any drainage or open wounds, denies any pains; has baseline LE swelling that is also unchanged (improved if anything per pt), no fever, no chills, no n/v or other systemic symptoms.   Of note, pt endorsing one week of on/off loose stools, non bloody, no melena; in ED had one completely watery diarrheal movement - denies any abd pain.     COVID19 PCR (8/30) Negative. ESR (8/30) 106. CRP (8/30) 180. XR Foot (8/30) Questionable bony erosion of the tuft of the first distal phalanx.     prior hospital charts reviewed [  ]  primary team notes reviewed [ x ]  other consultant notes reviewed [ x ]    PAST MEDICAL & SURGICAL HISTORY:  Diabetes      Hypercholesteremia      Neuropathy      Hypertension      Renal stones  25 years ago      Fatty liver disease, nonalcoholic      Obesity      Hepatic encephalopathy      GERD with esophagitis  Gastritis &amp; Non Bleeding Ulcers      GIB (gastrointestinal bleeding)      S/P cholecystectomy          Allergies  codeine (Anaphylaxis)    ANTIMICROBIALS (past 90 days)  MEDICATIONS  (STANDING):    piperacillin/tazobactam IVPB...   200 mL/Hr IV Intermittent (08-30-22 @ 05:24)    vancomycin  IVPB   250 mL/Hr IV Intermittent (08-30-22 @ 06:59)      ANTIMICROBIALS:    piperacillin/tazobactam IVPB. 3.375 once  vancomycin  IVPB 1250 every 24 hours    OTHER MEDS: MEDICATIONS  (STANDING):  acetaminophen     Tablet .. 650 every 6 hours PRN  aluminum hydroxide/magnesium hydroxide/simethicone Suspension 30 every 4 hours PRN  dextrose 50% Injectable 25 once  dextrose 50% Injectable 12.5 once  dextrose 50% Injectable 25 once  dextrose Oral Gel 15 once PRN  everolimus (ZORTRESS) 3 two times a day  glucagon  Injectable 1 once  heparin   Injectable 5000 every 8 hours  insulin glargine Injectable (LANTUS) 14 at bedtime  insulin lispro (ADMELOG) corrective regimen sliding scale  three times a day before meals  insulin lispro (ADMELOG) corrective regimen sliding scale  at bedtime  insulin lispro Injectable (ADMELOG) 10 three times a day before meals  melatonin 3 at bedtime PRN  mycophenolate mofetil 250 two times a day  ondansetron Injectable 4 every 8 hours PRN  pantoprazole    Tablet 40 before breakfast  sertraline 100 daily    SOCIAL HISTORY: no smoking or etoh    FAMILY HISTORY:  Family history of stomach cancer    Family history of hypertension    Family history of type 2 diabetes mellitus      REVIEW OF SYSTEMS  [  ] ROS unobtainable because:    [x  ] All other systems negative except as noted below:	    Constitutional:  [ ] fever [ ] chills  [ ] weight loss  [ ] weakness  Skin:  [ ] rash [ ] phlebitis	  Eyes: [ ] icterus [ ] pain  [ ] discharge	  ENMT: [ ] sore throat  [ ] thrush [ ] ulcers [ ] exudates  Respiratory: [ ] dyspnea [ ] hemoptysis [ ] cough [ ] sputum	  Cardiovascular:  [ ] chest pain [ ] palpitations [ ] edema	  Gastrointestinal:  [ ] nausea [ ] vomiting [ ] diarrhea [ ] constipation [ ] pain	  Genitourinary:  [ ] dysuria [ ] frequency [ ] hematuria [ ] discharge [ ] flank pain  [ ] incontinence  Musculoskeletal:  [ ] myalgias [ ] arthralgias [ ] arthritis  [ ] back pain +RLE 1st toe wound  Neurological:  [ ] headache [ ] seizures  [ ] confusion/altered mental status  Psychiatric:  [ ] anxiety [ ] depression	  Hematology/Lymphatics:  [ ] lymphadenopathy  Endocrine:  [ ] adrenal [ ] thyroid  Allergic/Immunologic:	 [ ] transplant [ ] seasonal    Vital Signs Last 24 Hrs  T(F): 97.9 (08-30-22 @ 09:01), Max: 98.4 (08-30-22 @ 01:30)  Vital Signs Last 24 Hrs  HR: 86 (08-30-22 @ 09:01) (82 - 86)  BP: 123/59 (08-30-22 @ 09:01) (111/69 - 145/72)  RR: 15 (08-30-22 @ 09:01)  SpO2: 96% (08-30-22 @ 09:01) (96% - 98%)  Wt(kg): --    PHYSICAL EXAMINATION:  General: Alert and Awake, NAD  HEENT: PERRL, EOMI, No subconjunctival hemorrhages, Oropharynx Clear, MMM  Neck: Supple, No CHELSEA  Cardiac: RRR, No M/R/G  Resp: CTAB, No Wh/Rh/Ra  Abdomen: NBS, NT/ND, No HSM, No rigidity or guarding  MSK: RLE 1st digit enlarged with faint erythema and removed nail. No LE edema. No stigmata of IE. No evidence of phlebitis. No evidence of synovitis.  : No bob  Skin: Findings per MSK. Skin is warm and dry to the touch.   Neuro: Alert and Awake. CN 2-12 Grossly intact. Moves all four extremities spontaneously.  Psych: Calm, Pleasant, Cooperative                          8.9    5.25  )-----------( 220      ( 30 Aug 2022 04:09 )             28.5     08-30    136  |  105  |  42<H>  ----------------------------<  205<H>  3.7   |  17<L>  |  2.34<H>    Ca    8.7      30 Aug 2022 04:09      MICROBIOLOGY:    COVID19 PCR (8/30) Negative    RADIOLOGY:    <The imaging below has been reviewed and visualized by me independently. Findings as detailed in report below>    EXAM:  XR FOOT COMP MIN 3 VIEWS RT                        PROCEDURE DATE:  08/30/2022    No acute fracture or dislocation.  Questionable bony erosion of the tuft of the first distal phalanx.   Findings are equivocal for osteomyelitis and an MRI of the forefoot may be obtained for more definitive evaluation.

## 2022-08-30 NOTE — ED PROVIDER NOTE - OBJECTIVE STATEMENT
65yo M w/ pmhx of IDDM, HLD, liver transplant, CKD presented to the ED w/ 10 day history of R toe wound. The patient reported that he got tangled on the bed sheets and because of that his toe nail broke. At the moment he saw minimal amount of blood in the nail; due to severe neuropathy he has no sensation in his feet. He did not feel changes in the next days and 4 days prior to presentation his son alerted him of changes in his foot. He visited his Podiatry the day of presentation who instructed him to visited the ED immediately. The patient denied fevers, chills, pain, drainage although he had not been inspecting his feet.

## 2022-08-30 NOTE — H&P ADULT - HISTORY OF PRESENT ILLNESS
66M with PMH JEAN cirrhosis s/p liver transplant 7/2/20 (on everolimus/cellcept), HLD, T2DM on insulin with neuropathy, obesity, HFpEF, recent admission in 6/2022 for kidney stone, passed without intervention; now p/w R toe infection. Per pt, about 1 week ago, his feet got tangled in his bedsheet and part of the nails on b/l 1st toes  were sheared off; the next day, there was a small blood spot on the R toe. Over the next couple of days, pt did not notice any significant changes; on Thursday evening his son noted the R toe to look swollen and red. Pt denies any pain, but has severe b/l neuropathy in his feet and has very diminished sensation at baseline. Contacted his podiatrist over the weekend and had appt for Monday 8/29 - his podiatrist was c/f worsening cellulitis vs OM and pt sent to ED for further evaluation. Pt states the swelling and erythema of R toe has remains stable since Thursday 8/25, denies any drainage or open wounds, denies any pains; has baseline LE swelling that is also unchanged (improved if anything per pt), no fever, no chills, no n/v or other systemic symptoms.   Of note, pt endorsing one week of on/off loose stools, non bloody, no melena; in ED had one completely watery diarrheal movement - denies any abd pain.     In ED, pt with stable vitals; had fot XR with ?erosions c/f OM. Given vanco/zosyn. Podiatry consulted,

## 2022-08-30 NOTE — H&P ADULT - PROBLEM SELECTOR PLAN 5
A1c 8.0 in 6/2022  c/w home dose basal/bolus regimen --> lantus 14units at bedtime and admelog 10units TID   low ISS and mointor FS anc and hs  adjust insulin regimen as needed

## 2022-08-30 NOTE — PATIENT PROFILE ADULT - NSPROMEDSADMININFO_GEN_A_NUR
Daily Note     Today's date: 2022  Patient name: Lauren Moon  : 1942  MRN: [id number]  Referring provider: Maggi Colmenares MD  Dx:   Encounter Diagnosis     ICD-10-CM    1  Left hip pain  M25 552    2  Unilateral primary osteoarthritis, left hip  M16 12    3  Aftercare following left hip joint replacement surgery  Z47 1     Z96 [id number]                   Subjective: Pt reports she had pain last night making it hard to sleep  She believes this may be secondary to decreasing pain medication  Objective: See treatment diary below      Assessment: Tolerated treatment well  Patient with difficulty relaxing to allow PROM of her L hip      Plan:  Add Sharpened Rhomberg and Marching     Precautions: HTN, SOB, Hip Precautions      Manuals        PROM L Hip   15'       MFR TO L ITB                           Neuro Re-Ed         Rhomberg Stance  3x30" D/C      Sharpened Rhomberg   ADD      Tandem Stance         Weight Shifts   30x                                  Ther Ex         Nu Step  L3 6 min       TM         Supine Hip Flexor Stretch         Supine Hamstring Stretch 30" hold  3x       Marching   ADD      Mini Squats         Monster Walks         Side Steps         Step Ups                           Ther Activity                           Gait Training                           Modalities no concerns

## 2022-08-30 NOTE — H&P ADULT - PROBLEM SELECTOR PLAN 1
R 1st toe with swelling and erythema, XR showing possible bony erosion,  and  - overall c/f OM  - podiatry following, wound probing to bone, cx obtained  - MRI ordered, will f/u  - c/w vanco/zosyn for now, monitor vanco troughs and monitor Cr closely   - ID consulted, f/u further recs   - f/u all blood and wound cx   - per podiarty and my d/w patient - refusing surgical treatment/amputation if OM confirmed, wants to c/w medical and abx management only

## 2022-08-30 NOTE — ED ADULT NURSE NOTE - OBJECTIVE STATEMENT
66y male PMH liver transplant 2 years ago, DM to the ED from home via triage c/o of diabetic foot wound. Pt reports that about 8-10 days ago "I got my toe stuck in the sheets and ripped my toenail off." Pt reports that pt has diabetic neuropathy so was unable to feel that pt had wound. Pt reports son was checking wound every night for pt and states that wound "did not get bad" until 2 days ago. Pt tried to get appt with podiatrist but was unable to- podiatrist previously told pt that pt may need toe amputation. Pt also reports that left leg has "been giving out" on pt in the past few weeks. Pt denies chest pain, palpitations, shortness of breath, headache, visual disturbances, numbness/tingling, fever, chills, diaphoresis,  nausea, vomiting, constipation, diarrhea, or urinary symptoms. Stretcher in lowest position and locked, appropriate side rails in place, room cleared of clutter and safety hazards, call bell in reach- pt oriented to use, blankets given for comfort

## 2022-08-30 NOTE — PATIENT PROFILE ADULT - FALL HARM RISK - HARM RISK INTERVENTIONS

## 2022-08-30 NOTE — H&P ADULT - NSHPREVIEWOFSYSTEMS_GEN_ALL_CORE
REVIEW OF SYSTEMS:    CONSTITUTIONAL: No weakness, fevers, chills  EYES/ENT: No visual changes;  no throat pain   NECK: No pain or stiffness  RESPIRATORY: No cough, no shortness of breath  CARDIOVASCULAR: No chest pain or palpitations, +chronic LE edema   GASTROINTESTINAL: no nausea, vomiting, no abdominal pain, no BRBPR, +diarrhea   GENITOURINARY: no polyuria, no dysuria  NEUROLOGICAL: +chronic numbness, no headaches, no confusion   MUSCULOSKELETAL: no back pain, no weakness   SKIN: R toe erythema and swelling   PSYCH: no anxiety, depression  HEME: no gum bleeding, no bruising

## 2022-08-30 NOTE — ED PROVIDER NOTE - PHYSICAL EXAMINATION
MSK: RLE violaceous wound in the tip of the R toe w/ associated erythema, warmth and swelling. R nail broken up to mid toe. Skin peeling in R toe | LLE wnl.   Neuro: loss of sensation in b/l feet, strength normal throughout

## 2022-08-30 NOTE — ED ADULT NURSE NOTE - NSIMPLEMENTINTERV_GEN_ALL_ED
Implemented All Fall Risk Interventions:  Page to call system. Call bell, personal items and telephone within reach. Instruct patient to call for assistance. Room bathroom lighting operational. Non-slip footwear when patient is off stretcher. Physically safe environment: no spills, clutter or unnecessary equipment. Stretcher in lowest position, wheels locked, appropriate side rails in place. Provide visual cue, wrist band, yellow gown, etc. Monitor gait and stability. Monitor for mental status changes and reorient to person, place, and time. Review medications for side effects contributing to fall risk. Reinforce activity limits and safety measures with patient and family.

## 2022-08-31 NOTE — PROGRESS NOTE ADULT - ASSESSMENT
67 y/o M w/ R hallux wound to level of bone  - Pt seen and evaluated  - Afebrile, no leukocytosis  - right foot: distal hallux wound to bone w/ fibrotic base, malodor, erythema extending from hallux to forefoot, no soft tissue crepitations, no drainage  - R foot XR: OM of distal hallux distal phalanx, no tracking soft tissue air  - R foot MR: OM of hallux distal phalanx  - R foot wound culture pending  - ID consult pending  - Third discussion with patient held bedside regarding most definitive treatment for bone infection of his great toe. It was reiterated that long term IV abx in the setting of liver transplant and CKD is not without risks. It was also communicated that it is very unlikely for long term IV abx to cure a bone infection. Upon discussion with his transplant doctor (Dr. Mo) and discussion with podiatry today, Pt is now amenable for right foot partial first ray amputation  - please document medical clearance for right foot partial first ray amputation  - Seen with attending

## 2022-08-31 NOTE — PROGRESS NOTE ADULT - SUBJECTIVE AND OBJECTIVE BOX
Follow Up:      Interval History:    REVIEW OF SYSTEMS  [  ] ROS unobtainable because:    [  ] All other systems negative except as noted below    Constitutional:  [ ] fever [ ] chills  [ ] weight loss  [ ] weakness  Skin:  [ ] rash [ ] phlebitis	  Eyes: [ ] icterus [ ] pain  [ ] discharge	  ENMT: [ ] sore throat  [ ] thrush [ ] ulcers [ ] exudates  Respiratory: [ ] dyspnea [ ] hemoptysis [ ] cough [ ] sputum	  Cardiovascular:  [ ] chest pain [ ] palpitations [ ] edema	  Gastrointestinal:  [ ] nausea [ ] vomiting [ ] diarrhea [ ] constipation [ ] pain	  Genitourinary:  [ ] dysuria [ ] frequency [ ] hematuria [ ] discharge [ ] flank pain  [ ] incontinence  Musculoskeletal:  [ ] myalgias [ ] arthralgias [ ] arthritis  [ ] back pain  Neurological:  [ ] headache [ ] seizures  [ ] confusion/altered mental status    Allergies  codeine (Anaphylaxis)        ANTIMICROBIALS:  cefepime   IVPB 2000 every 12 hours  cefepime   IVPB    vancomycin  IVPB 1250 every 24 hours      OTHER MEDS:  MEDICATIONS  (STANDING):  acetaminophen     Tablet .. 650 every 6 hours PRN  aluminum hydroxide/magnesium hydroxide/simethicone Suspension 30 every 4 hours PRN  dextrose 50% Injectable 25 once  dextrose 50% Injectable 12.5 once  dextrose 50% Injectable 25 once  dextrose Oral Gel 15 once PRN  everolimus (ZORTRESS) 3 <User Schedule>  glucagon  Injectable 1 once  heparin   Injectable 5000 every 8 hours  insulin glargine Injectable (LANTUS) 14 at bedtime  insulin lispro (ADMELOG) corrective regimen sliding scale  three times a day before meals  insulin lispro (ADMELOG) corrective regimen sliding scale  at bedtime  insulin lispro Injectable (ADMELOG) 10 three times a day before meals  melatonin 3 at bedtime PRN  ondansetron Injectable 4 every 8 hours PRN  pantoprazole    Tablet 40 before breakfast  sertraline 100 daily      Vital Signs Last 24 Hrs  T(C): 36.7 (31 Aug 2022 13:00), Max: 37.3 (30 Aug 2022 18:45)  T(F): 98 (31 Aug 2022 13:00), Max: 99.1 (30 Aug 2022 18:45)  HR: 82 (31 Aug 2022 13:00) (68 - 90)  BP: 161/77 (31 Aug 2022 13:00) (129/72 - 161/77)  BP(mean): --  RR: 18 (31 Aug 2022 13:00) (17 - 18)  SpO2: 97% (31 Aug 2022 13:00) (93% - 98%)    Parameters below as of 31 Aug 2022 13:00  Patient On (Oxygen Delivery Method): room air        PHYSICAL EXAMINATION:  General: Alert and Awake, NAD  HEENT: PERRL, EOMI  Neck: Supple  Cardiac: RRR, No M/R/G  Resp: CTAB, No Wh/Rh/Ra  Abdomen: NBS, NT/ND, No HSM, No rigidity or guarding  MSK: No LE edema. No Calf tenderness  : No bob  Skin: No rashes or lesions. Skin is warm and dry to the touch.   Neuro: Alert and Awake. CN 2-12 Grossly intact. Moves all four extremities spontaneously.  Psych: Calm, Pleasant, Cooperative                          7.6    3.47  )-----------( 214      ( 31 Aug 2022 06:33 )             23.8       08-31    138  |  109<H>  |  35<H>  ----------------------------<  124<H>  3.6   |  19<L>  |  2.05<H>    Ca    8.7      31 Aug 2022 06:33  Phos  2.7     08-31  Mg     1.8     08-31    TPro  6.3  /  Alb  3.1<L>  /  TBili  0.2  /  DBili  x   /  AST  23  /  ALT  28  /  AlkPhos  145<H>  08-31          MICROBIOLOGY:  v  .Blood Blood-Peripheral  08-30-22   No growth to date.  --  --      .Blood Blood-Peripheral  08-30-22   No growth to date.  --  --          CMVPCR Log: Det <1.54 Assay Dynamic Range: 34.5 to 1.0E+07 IU/mL (1.54 to 7.00 Log10 IU/mL)  Assay lower limit of quantification (LLOQ) is 34.5 IU/mL (1.54 Log10  IU/mL)  CMV DNA detected below the LLOQ will be reported as Detected < 34.5 IU/mL  (<1.54 Log10 IU/mL)  [facility] Cytomegalovirus (CMV) is an FDA-cleared quantitative test that  enables the detection and quantitation of CMV DNA in EDTA plasma of  infected transplant patients on the marcell 8800 system. This test was  verified by [facility]. Results should be interpreted  with consideration of all clinical findings and laboratory findings and  should not form the sole basis for a diagnosis or treatment decision. Wbm24TU/mL (08-31 @ 06:33)    Clostridium difficile GDH Toxins A&amp;B, EIA:   Negative (08-30-22 @ 14:20)  Clostridium difficile GDH Interpretation: Negative for toxigenic C. Difficile.  This specimen is negative for C.  Difficile glutamate dehydrogenase (GDH) antigen and negative for C.  Difficile Toxins A & B, by EIA.  GDH is a highly sensitive screening  marker for C. Difficile that is produced in large amounts by all C.  Difficile strains, both toxigenic and nontoxigenic.  This assay has not  been validated as a test of cure.  Repeat testing during the same episode  of diarrhea is of limited value and is discouraged.  The results of this  assay should always be interpreted in conjunction with pateint's clinical  history. (08-30-22 @ 14:20)      RADIOLOGY:    <The imaging below has been reviewed and visualized by me independently. Findings as detailed in report below> Follow Up:  Toe Osteomyelitis    Interval History: afebrile. no acute overnight events.     REVIEW OF SYSTEMS  [  ] ROS unobtainable because:    [  x] All other systems negative except as noted below    Constitutional:  [ ] fever [ ] chills  [ ] weight loss  [ ] weakness  Skin:  [ ] rash [ ] phlebitis	  Eyes: [ ] icterus [ ] pain  [ ] discharge	  ENMT: [ ] sore throat  [ ] thrush [ ] ulcers [ ] exudates  Respiratory: [ ] dyspnea [ ] hemoptysis [ ] cough [ ] sputum	  Cardiovascular:  [ ] chest pain [ ] palpitations [ ] edema	  Gastrointestinal:  [ ] nausea [ ] vomiting [ ] diarrhea [ ] constipation [ ] pain	  Genitourinary:  [ ] dysuria [ ] frequency [ ] hematuria [ ] discharge [ ] flank pain  [ ] incontinence  Musculoskeletal:  [ ] myalgias [ ] arthralgias [ ] arthritis  [ ] back pain  Neurological:  [ ] headache [ ] seizures  [ ] confusion/altered mental status    Allergies  codeine (Anaphylaxis)        ANTIMICROBIALS:  cefepime   IVPB 2000 every 12 hours  cefepime   IVPB    vancomycin  IVPB 1250 every 24 hours      OTHER MEDS:  MEDICATIONS  (STANDING):  acetaminophen     Tablet .. 650 every 6 hours PRN  aluminum hydroxide/magnesium hydroxide/simethicone Suspension 30 every 4 hours PRN  dextrose 50% Injectable 25 once  dextrose 50% Injectable 12.5 once  dextrose 50% Injectable 25 once  dextrose Oral Gel 15 once PRN  everolimus (ZORTRESS) 3 <User Schedule>  glucagon  Injectable 1 once  heparin   Injectable 5000 every 8 hours  insulin glargine Injectable (LANTUS) 14 at bedtime  insulin lispro (ADMELOG) corrective regimen sliding scale  three times a day before meals  insulin lispro (ADMELOG) corrective regimen sliding scale  at bedtime  insulin lispro Injectable (ADMELOG) 10 three times a day before meals  melatonin 3 at bedtime PRN  ondansetron Injectable 4 every 8 hours PRN  pantoprazole    Tablet 40 before breakfast  sertraline 100 daily      Vital Signs Last 24 Hrs  T(C): 36.7 (31 Aug 2022 13:00), Max: 37.3 (30 Aug 2022 18:45)  T(F): 98 (31 Aug 2022 13:00), Max: 99.1 (30 Aug 2022 18:45)  HR: 82 (31 Aug 2022 13:00) (68 - 90)  BP: 161/77 (31 Aug 2022 13:00) (129/72 - 161/77)  BP(mean): --  RR: 18 (31 Aug 2022 13:00) (17 - 18)  SpO2: 97% (31 Aug 2022 13:00) (93% - 98%)    Parameters below as of 31 Aug 2022 13:00  Patient On (Oxygen Delivery Method): room air    PHYSICAL EXAMINATION:  General: Alert and Awake, NAD  Cardiac: RRR, No M/R/G  Resp: CTAB, No Wh/Rh/Ra  Abdomen: NBS, NT/ND, No HSM, No rigidity or guarding  MSK: Dressing over RLE 1st digit.No LE edema. No stigmata of IE. No evidence of phlebitis. No evidence of synovitis.  : No bob  Skin: Findings per MSK. Skin is warm and dry to the touch.   Neuro: Alert and Awake. CN 2-12 Grossly intact. Moves all four extremities spontaneously.  Psych: Calm, Pleasant, Cooperative                            7.6    3.47  )-----------( 214      ( 31 Aug 2022 06:33 )             23.8       08-31    138  |  109<H>  |  35<H>  ----------------------------<  124<H>  3.6   |  19<L>  |  2.05<H>    Ca    8.7      31 Aug 2022 06:33  Phos  2.7     08-31  Mg     1.8     08-31    TPro  6.3  /  Alb  3.1<L>  /  TBili  0.2  /  DBili  x   /  AST  23  /  ALT  28  /  AlkPhos  145<H>  08-31          MICROBIOLOGY:  v  .Blood Blood-Peripheral  08-30-22   No growth to date.  --  --      .Blood Blood-Peripheral  08-30-22   No growth to date.  --  --          CMVPCR Log: Det <1.54 Assay Dynamic Range: 34.5 to 1.0E+07 IU/mL (1.54 to 7.00 Log10 IU/mL)  Assay lower limit of quantification (LLOQ) is 34.5 IU/mL (1.54 Log10  IU/mL)  CMV DNA detected below the LLOQ will be reported as Detected < 34.5 IU/mL  (<1.54 Log10 IU/mL)  [facility] Cytomegalovirus (CMV) is an FDA-cleared quantitative test that  enables the detection and quantitation of CMV DNA in EDTA plasma of  infected transplant patients on the marcell 8800 system. This test was  verified by [employer]. Results should be interpreted  with consideration of all clinical findings and laboratory findings and  should not form the sole basis for a diagnosis or treatment decision. Rqj80FX/mL (08-31 @ 06:33)    Clostridium difficile GDH Toxins A&amp;B, EIA:   Negative (08-30-22 @ 14:20)  Clostridium difficile GDH Interpretation: Negative for toxigenic C. Difficile.  This specimen is negative for C.  Difficile glutamate dehydrogenase (GDH) antigen and negative for C.  Difficile Toxins A & B, by EIA.  GDH is a highly sensitive screening  marker for C. Difficile that is produced in large amounts by all C.  Difficile strains, both toxigenic and nontoxigenic.  This assay has not  been validated as a test of cure.  Repeat testing during the same episode  of diarrhea is of limited value and is discouraged.  The results of this  assay should always be interpreted in conjunction with pateint's clinical  history. (08-30-22 @ 14:20)      RADIOLOGY:    <The imaging below has been reviewed and visualized by me independently. Findings as detailed in report below>    < from: MR Foot w/wo IV Cont, Right (08.31.22 @ 10:36) >  IMPRESSION:  Soft tissue irregularity at the distal tip of the phalanx with   osteomyelitis of the distal phalanx tip with likely erosions within the   plantar distal tip of the distal phalanx.  Acute to subacute focal fracture of the lateral base of the distal first   phalanx.  Subchondral trabecular fracture of the second metatarsal head.  Edema within the medial first metatarsal head, which may represent   sequelaof enthesopathy versus erosions in the setting of inflammatory or   crystalline arthropathy.  Tarsometatarsal arthrosis.

## 2022-08-31 NOTE — PROGRESS NOTE ADULT - SUBJECTIVE AND OBJECTIVE BOX
Chief Complaint:  Patient is a 66y old  Male who presents with a chief complaint of R toe infection (30 Aug 2022 17:45)       Interval Events:   Afebrile  C diff negative  Completed [facility] Medications:  acetaminophen     Tablet .. 650 milliGRAM(s) Oral every 6 hours PRN  aluminum hydroxide/magnesium hydroxide/simethicone Suspension 30 milliLiter(s) Oral every 4 hours PRN  cefepime   IVPB 2000 milliGRAM(s) IV Intermittent every 12 hours  cefepime   IVPB      chlorhexidine 2% Cloths 1 Application(s) Topical daily  dextrose 5%. 1000 milliLiter(s) IV Continuous <Continuous>  dextrose 5%. 1000 milliLiter(s) IV Continuous <Continuous>  dextrose 50% Injectable 25 Gram(s) IV Push once  dextrose 50% Injectable 12.5 Gram(s) IV Push once  dextrose 50% Injectable 25 Gram(s) IV Push once  dextrose Oral Gel 15 Gram(s) Oral once PRN  everolimus (ZORTRESS) 3 milliGRAM(s) Oral two times a day  glucagon  Injectable 1 milliGRAM(s) IntraMuscular once  heparin   Injectable 5000 Unit(s) SubCutaneous every 8 hours  insulin glargine Injectable (LANTUS) 14 Unit(s) SubCutaneous at bedtime  insulin lispro (ADMELOG) corrective regimen sliding scale   SubCutaneous three times a day before meals  insulin lispro (ADMELOG) corrective regimen sliding scale   SubCutaneous at bedtime  insulin lispro Injectable (ADMELOG) 10 Unit(s) SubCutaneous three times a day before meals  melatonin 3 milliGRAM(s) Oral at bedtime PRN  ondansetron Injectable 4 milliGRAM(s) IV Push every 8 hours PRN  pantoprazole    Tablet 40 milliGRAM(s) Oral before breakfast  sertraline 100 milliGRAM(s) Oral daily  vancomycin  IVPB 1250 milliGRAM(s) IV Intermittent every 24 hours      ROS:   Complete and normal except as mentioned above.    PHYSICAL EXAM:   Vital Signs:  Vital Signs Last 24 Hrs  T(C): 36.8 (31 Aug 2022 09:00), Max: 37.3 (30 Aug 2022 18:45)  T(F): 98.3 (31 Aug 2022 09:00), Max: 99.1 (30 Aug 2022 18:45)  HR: 86 (31 Aug 2022 09:00) (68 - 90)  BP: 146/73 (31 Aug 2022 09:00) (129/72 - 148/69)  BP(mean): --  RR: 18 (31 Aug 2022 09:00) (15 - 18)  SpO2: 94% (31 Aug 2022 09:00) (93% - 99%)    Parameters below as of 31 Aug 2022 09:00  Patient On (Oxygen Delivery Method): room air      Daily Height in cm: 185.42 (30 Aug 2022 18:45)    Daily     GENERAL: no acute distress  NEURO: alert  HEENT: NCAT, no conjunctival pallor appreciated  CHEST: no respiratory distress, no accessory muscle use  CARDIAC: regular rate, +S1/S2  ABDOMEN: soft, nontender, no rebound or guarding  EXTREMITIES: warm, well perfused; right foot digit dressing c/d/i  SKIN: no lesions noted    LABS: reviewed                        7.6    3.47  )-----------( 214      ( 31 Aug 2022 06:33 )             23.8     08-31    138  |  109<H>  |  35<H>  ----------------------------<  124<H>  3.6   |  19<L>  |  2.05<H>    Ca    8.7      31 Aug 2022 06:33  Phos  2.7     08-31  Mg     1.8     08-31    TPro  6.3  /  Alb  3.1<L>  /  TBili  0.2  /  DBili  x   /  AST  23  /  ALT  28  /  AlkPhos  145<H>  08-31    LIVER FUNCTIONS - ( 31 Aug 2022 06:33 )  Alb: 3.1 g/dL / Pro: 6.3 g/dL / ALK PHOS: 145 U/L / ALT: 28 U/L / AST: 23 U/L / GGT: x             Interval Diagnostic Studies: see sunrise for full report

## 2022-08-31 NOTE — PROGRESS NOTE ADULT - SUBJECTIVE AND OBJECTIVE BOX
Patient is a 66y old  Male who presents with a chief complaint of R toe infection (30 Aug 2022 17:45)       INTERVAL HPI/OVERNIGHT EVENTS:  Patient seen and evaluated at bedside.  Pt is resting comfortable in NAD. Denies N/V/F/C.     Allergies    codeine (Anaphylaxis)    Intolerances        Vital Signs Last 24 Hrs  T(C): 36.8 (31 Aug 2022 09:00), Max: 37.3 (30 Aug 2022 18:45)  T(F): 98.3 (31 Aug 2022 09:00), Max: 99.1 (30 Aug 2022 18:45)  HR: 86 (31 Aug 2022 09:00) (68 - 90)  BP: 146/73 (31 Aug 2022 09:00) (129/72 - 148/69)  BP(mean): --  RR: 18 (31 Aug 2022 09:00) (17 - 18)  SpO2: 94% (31 Aug 2022 09:00) (93% - 98%)    Parameters below as of 31 Aug 2022 09:00  Patient On (Oxygen Delivery Method): room air        LABS:                        7.6    3.47  )-----------( 214      ( 31 Aug 2022 06:33 )             23.8     08-31    138  |  109<H>  |  35<H>  ----------------------------<  124<H>  3.6   |  19<L>  |  2.05<H>    Ca    8.7      31 Aug 2022 06:33  Phos  2.7     08-31  Mg     1.8     08-31    TPro  6.3  /  Alb  3.1<L>  /  TBili  0.2  /  DBili  x   /  AST  23  /  ALT  28  /  AlkPhos  145<H>  08-31    PT/INR - ( 31 Aug 2022 06:33 )   PT: 13.7 sec;   INR: 1.19 ratio         PTT - ( 31 Aug 2022 06:33 )  PTT:29.7 sec    CAPILLARY BLOOD GLUCOSE      POCT Blood Glucose.: 60 mg/dL (31 Aug 2022 12:53)  POCT Blood Glucose.: 62 mg/dL (31 Aug 2022 12:52)  POCT Blood Glucose.: 144 mg/dL (31 Aug 2022 08:03)  POCT Blood Glucose.: 89 mg/dL (30 Aug 2022 22:15)  POCT Blood Glucose.: 142 mg/dL (30 Aug 2022 19:20)  POCT Blood Glucose.: 175 mg/dL (30 Aug 2022 13:50)      Lower Extremity Physical Exam:  Vascular: DP/PT 2/4, B/L, CFT <3seconds B/L, Temperature gradient warm to cool, B/L.   Neuro: Epicritic sensation diminished to the level of toes, B/L.  Musculoskeletal/Ortho: unremarkable  Skin: right foot: distal hallux wound to bone w/ fibrotic base, malodor, erythema extending from hallux to forefoot, no soft tissue crepitations, no drainage    RADIOLOGY & ADDITIONAL TESTS:

## 2022-08-31 NOTE — PROGRESS NOTE ADULT - ASSESSMENT
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     COVID19 PCR (8/30) Negative.   ESR (8/30) 106.   CRP (8/30) 180.     XR Foot (8/30) Questionable bony erosion of the tuft of the first distal phalanx.    On 8/30 s/p removal of toenail with nail bed wound that extended to bone.     MRI RLE (8/31) osteomyelitis of the distal phalanx tip with likely erosions within the plantar distal tip of the distal phalanx.    #RLE 1st digit OM  --Continue Vancomycin 1.25g IV Q24H. Check trough prior to third dose. Target trough 10-15  --Continue Cefepime 2g IV Q12H  --Continue to follow CBC with diff  --Continue to follow temperature curve  --Follow up on preliminary blood cultures  --Follow up on preliminary wound culture    #Liver Transplant Recipient, Recent CMV Viremia  --Follow up on CMV PCR    I will continue to follow. Please feel free to contact me with any further questions.    Eusebio Pérez M.D.  [facility] Division of Infectious Disease  8AM-5PM Monday - Friday: Available on [employer] Teams  After Hours and Holidays (or if no response on [employer] Teams): Please contact the Infectious Diseases Office at [phone]    The above assessment and plan were discussed with Sebastian transplant surgery PA

## 2022-08-31 NOTE — PROGRESS NOTE ADULT - ATTENDING COMMENTS
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 Afebrile and hemodynamically stable and currently on cefepime/vancomycin (8/30- ) with Transplant ID and Podiatry following, with tentative plan for right foot partial first ray amputation given potential increased risk of chronic infection if treated with prolonged IV antibiotics alone per Podiatry, especially in light of microvascular involvement. He is medically cleared to proceed with this low risk surgery. There is increased risk of impaired wound healing due to chronic everolimus therapy but unsafe to be switched to CNI given prior severe neurotoxicity. Holding MMF for now given active infection and stable hepatic allograft function. Starting Valcyte 900 mg po daily for low level CMV viremia but will need to monitor leukopenia.     Please don't hesitate to call with any questions or concerns.    Letitia Mo M.D., Ph.D.  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 Afebrile and hemodynamically stable and currently on cefepime/vancomycin (8/30- ) with Transplant ID and Podiatry following, with plan for right foot partial first ray amputation given potential increased risk of chronic infection if treated with prolonged IV antibiotics alone per Podiatry, especially in light of microvascular involvement. He is medically cleared to proceed with this low risk surgery. There is increased risk of impaired wound healing due to chronic everolimus therapy but unsafe to be switched to CNI given prior severe neurotoxicity. Given stable hepatic allograft function, will hold everolimus for now and ideally until surgical wound is fully healed and re-start  mg po bid and prednisone 10 mg po daily for now for immunosuppression. Starting Valcyte for low level CMV viremia but will need to monitor leukopenia especially with resumed MMF. Also anticipating that he will need adjustments to insulin regimen now that he will be back on steroids.    Please don't hesitate to call with any questions or concerns.    Letitia Mo M.D., Ph.D.  Transplant Hepatology

## 2022-08-31 NOTE — PROGRESS NOTE ADULT - ASSESSMENT
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  Patient has history of LT in July 2020 due to JEAN cirrhosis.  Post-LT course c/b VRE bacteremia, CNI toxicity to both tacrolimus and cyclosporine, and is currently maintained on everolimus and CellCept.  Patient endorses recent trauma ~1 week PTA where toenail was "sheared off" from getting tangled in the bedsheet.  Subsequently, he noticed worsening swelling and erythema of the right great toe in the setting of known diabetic neuropathy. He denies any overt drainage or purulence.  Additionally, he endorses intermittent watery bowel movements over the past 1 week.    # Right toe osteomyelitis  # Diarrhea  # JEAN cirrhosis s/p LT 7/2/2020: maintained on everolimus and CellCept, had elevated liver chemistreies in December 2021 s/p liver biopsy which was negative for evidence of rejection but revealed NRH  # Low level CMV viremia 6/28/2022  # CKD  - C diff negative    Recommendations:  -trend clinical symptoms, exam findings, vital signs, CBC, CMP, INR  -Immunosuppression:       -Active/Maintenance: on home everolimus 3mg BID and CellCept 250mg BID; hold CellCept for now; patient at risk for impaired wound healing with everolimus however limited treatment options given significant CNI neurotoxicity. holding cellcept inpatient       -Levels: Everolimus, Whole Blood Result: 8.9 ng/mL (06-29-22 @ 06:20)  -Antibiotics: cefepime IVPB 2000 milliGRAM(s) IV  -appreciate recommendations from Transplant ID  -for watery diarrhea, f/u GI PCR, and CMV  -management of osteomyelitis and remainder per primary team  -follow up MRI read  -f/u Everolimus levels    Preliminary note until signed by Attending.    Thank you for involving us in this patient's care.    Charlotte Alcocer MD  Gastroenterology/Hepatology Fellow, PGY-VI    NON-URGENT CONSULTS:  Please email [email or web address]  AT NIGHT AND ON WEEKENDS:  Contact on-call GI fellow via answering service ([phone]) from 5pm-8am and on weekends/holidays  MONDAY-FRIDAY 8AM-5PM:  Pager# [phone] ([facility])  GI Phone# [phone] ([facility])   66 year old male with history of JEAN cirrhosis s/p OLT 7/2/20 (course c/b VRE bacteremia, CNI toxicity to both tacro and cyclosporine; switched to everolimus 7/27/20; low level CMV viremia 6/28/2022), HLD, IDDM, obesity, HFpEF, nephrolithiasis who presents with right toe infection.  Patient has history of LT in July 2020 due to JEAN cirrhosis.  Post-LT course c/b VRE bacteremia, CNI toxicity to both tacrolimus and cyclosporine, and is currently maintained on everolimus and CellCept.  Patient endorses recent trauma ~1 week PTA where toenail was "sheared off" from getting tangled in the bedsheet.  Subsequently, he noticed worsening swelling and erythema of the right great toe in the setting of known diabetic neuropathy. He denies any overt drainage or purulence.  Additionally, he endorses intermittent watery bowel movements over the past 1 week.    # Right toe osteomyelitis  # Diarrhea  # JEAN cirrhosis s/p LT 7/2/2020: maintained on everolimus and CellCept, had elevated liver chemistreies in December 2021 s/p liver biopsy which was negative for evidence of rejection but revealed NRH  # Low level CMV viremia 6/28/2022  # CKD  - C diff negative    Recommendations:  -trend clinical symptoms, exam findings, vital signs, CBC, CMP, INR  -Immunosuppression:       -Active/Maintenance: on home everolimus 3mg BID and CellCept 250mg BID; hold CellCept for now; patient at risk for impaired wound healing with everolimus however limited treatment options given significant CNI neurotoxicity. holding cellcept inpatient       -Levels: Everolimus, Whole Blood Result: 8.9 ng/mL (06-29-22 @ 06:20)  -Antibiotics: cefepime IVPB 2000 milliGRAM(s) IV  -appreciate recommendations from Transplant ID  -for watery diarrhea, f/u GI PCR, and CMV  -management of osteomyelitis and remainder per primary team  -follow up MRI read  -f/u Everolimus levels  He is medically cleared to proceed with this low risk surgery.    Preliminary note until signed by Attending.    Thank you for involving us in this patient's care.    Charlotte Alcocer MD  Gastroenterology/Hepatology Fellow, PGY-VI    NON-URGENT CONSULTS:  Please email [email or web address]  [email or web address]  AT NIGHT AND ON WEEKENDS:  Contact on-call GI fellow via answering service ([phone]) from 5pm-8am and on weekends/holidays  MONDAY-FRIDAY 8AM-5PM:  Pager# [phone] ([facility])  GI Phone# [phone] ([facility])

## 2022-09-01 NOTE — PROGRESS NOTE ADULT - SUBJECTIVE AND OBJECTIVE BOX
Chief Complaint:  Patient is a 66y old  Male who presents with a chief complaint of R toe infection (01 Sep 2022 10:45)       Interval Events:   No acute events overnight, remains afebrile    Hospital Medications:  acetaminophen     Tablet .. 650 milliGRAM(s) Oral every 6 hours PRN  aluminum hydroxide/magnesium hydroxide/simethicone Suspension 30 milliLiter(s) Oral every 4 hours PRN  artificial tears (preservative free) Ophthalmic Solution 1 Drop(s) Both EYES two times a day  carbamide peroxide Otic Solution 1 Drop(s) Both Ears two times a day  cefepime   IVPB 2000 milliGRAM(s) IV Intermittent every 12 hours  cefepime   IVPB      chlorhexidine 2% Cloths 1 Application(s) Topical daily  dextrose 5%. 1000 milliLiter(s) IV Continuous <Continuous>  dextrose 5%. 1000 milliLiter(s) IV Continuous <Continuous>  dextrose 50% Injectable 25 Gram(s) IV Push once  dextrose 50% Injectable 12.5 Gram(s) IV Push once  dextrose 50% Injectable 25 Gram(s) IV Push once  dextrose Oral Gel 15 Gram(s) Oral once PRN  glucagon  Injectable 1 milliGRAM(s) IntraMuscular once  insulin glargine Injectable (LANTUS) 14 Unit(s) SubCutaneous at bedtime  insulin lispro (ADMELOG) corrective regimen sliding scale   SubCutaneous three times a day before meals  insulin lispro (ADMELOG) corrective regimen sliding scale   SubCutaneous at bedtime  insulin lispro Injectable (ADMELOG) 10 Unit(s) SubCutaneous three times a day before meals  melatonin 3 milliGRAM(s) Oral at bedtime PRN  mycophenolate mofetil 250 milliGRAM(s) Oral <User Schedule>  ondansetron Injectable 4 milliGRAM(s) IV Push every 8 hours PRN  pantoprazole    Tablet 40 milliGRAM(s) Oral before breakfast  predniSONE   Tablet 10 milliGRAM(s) Oral daily  sertraline 100 milliGRAM(s) Oral daily  valGANciclovir 450 milliGRAM(s) Oral daily  vancomycin  IVPB 1250 milliGRAM(s) IV Intermittent every 24 hours      ROS:   Complete and normal except as mentioned above.    PHYSICAL EXAM:   Vital Signs:  Vital Signs Last 24 Hrs  T(C): 36.4 (01 Sep 2022 12:00), Max: 37.4 (01 Sep 2022 01:04)  T(F): 97.5 (01 Sep 2022 12:00), Max: 99.3 (01 Sep 2022 01:04)  HR: 67 (01 Sep 2022 12:00) (67 - 86)  BP: 150/79 (01 Sep 2022 12:00) (123/65 - 156/76)  BP(mean): 103 (01 Sep 2022 12:00) (103 - 103)  RR: 18 (01 Sep 2022 12:00) (17 - 18)  SpO2: 98% (01 Sep 2022 12:00) (94% - 98%)    Parameters below as of 01 Sep 2022 12:00  Patient On (Oxygen Delivery Method): room air      Daily     Daily     GENERAL: no acute distress  NEURO: alert  HEENT: NCAT, no conjunctival pallor appreciated  CHEST: no respiratory distress, no accessory muscle use  CARDIAC: regular rate, +S1/S2  ABDOMEN: soft, nontender, no rebound or guarding  EXTREMITIES: warm, well perfused; right foot digit dressing c/d/i  SKIN: no lesions noted    LABS: reviewed                        8.1    3.32  )-----------( 230      ( 01 Sep 2022 06:54 )             25.6     09-01    138  |  107  |  29<H>  ----------------------------<  153<H>  3.6   |  18<L>  |  1.86<H>    Ca    8.8      01 Sep 2022 06:54  Phos  2.8     09-01  Mg     1.9     09-01    TPro  6.6  /  Alb  3.3  /  TBili  0.2  /  DBili  x   /  AST  16  /  ALT  23  /  AlkPhos  144<H>  09-01    LIVER FUNCTIONS - ( 01 Sep 2022 06:54 )  Alb: 3.3 g/dL / Pro: 6.6 g/dL / ALK PHOS: 144 U/L / ALT: 23 U/L / AST: 16 U/L / GGT: x             Interval Diagnostic Studies: see sunrise for full report

## 2022-09-01 NOTE — PROGRESS NOTE ADULT - SUBJECTIVE AND OBJECTIVE BOX
Patient is a 66y old  Male who presents with a chief complaint of R toe infection (31 Aug 2022 16:38)       INTERVAL HPI/OVERNIGHT EVENTS:  Patient seen and evaluated at bedside.  Pt is resting comfortable in NAD. Denies N/V/F/C.  Pain rated at X/10    Allergies    codeine (Anaphylaxis)    Intolerances        Vital Signs Last 24 Hrs  T(C): 36.6 (01 Sep 2022 09:00), Max: 37.4 (01 Sep 2022 01:04)  T(F): 97.9 (01 Sep 2022 09:00), Max: 99.3 (01 Sep 2022 01:04)  HR: 71 (01 Sep 2022 09:00) (71 - 86)  BP: 123/65 (01 Sep 2022 09:00) (123/65 - 161/77)  BP(mean): --  RR: 17 (01 Sep 2022 09:00) (17 - 18)  SpO2: 94% (01 Sep 2022 09:00) (94% - 97%)    Parameters below as of 01 Sep 2022 09:00  Patient On (Oxygen Delivery Method): room air        LABS:                        8.1    3.32  )-----------( 230      ( 01 Sep 2022 06:54 )             25.6     09-01    138  |  107  |  29<H>  ----------------------------<  153<H>  3.6   |  18<L>  |  1.86<H>    Ca    8.8      01 Sep 2022 06:54  Phos  2.8     09-01  Mg     1.9     09-01    TPro  6.6  /  Alb  3.3  /  TBili  0.2  /  DBili  x   /  AST  16  /  ALT  23  /  AlkPhos  144<H>  09-01    PT/INR - ( 01 Sep 2022 06:54 )   PT: 13.4 sec;   INR: 1.15 ratio         PTT - ( 01 Sep 2022 06:54 )  PTT:29.4 sec    CAPILLARY BLOOD GLUCOSE      POCT Blood Glucose.: 185 mg/dL (01 Sep 2022 08:51)  POCT Blood Glucose.: 165 mg/dL (31 Aug 2022 21:05)  POCT Blood Glucose.: 215 mg/dL (31 Aug 2022 17:20)  POCT Blood Glucose.: 107 mg/dL (31 Aug 2022 13:32)  POCT Blood Glucose.: 65 mg/dL (31 Aug 2022 13:09)  POCT Blood Glucose.: 60 mg/dL (31 Aug 2022 12:53)  POCT Blood Glucose.: 62 mg/dL (31 Aug 2022 12:52)      Lower Extremity Physical Exam:    Vascular: DP/PT 2/4, B/L, CFT <3seconds B/L, Temperature gradient warm to cool, B/L.   Neuro: Epicritic sensation diminished to the level of toes, B/L.  Musculoskeletal/Ortho: unremarkable  Skin: right foot: distal hallux wound to bone w/ fibrotic base, malodor, erythema extending from hallux to forefoot, no soft tissue crepitations, no drainage      RADIOLOGY & ADDITIONAL TESTS:

## 2022-09-01 NOTE — PROGRESS NOTE ADULT - SUBJECTIVE AND OBJECTIVE BOX
Follow Up:      Interval History:    REVIEW OF SYSTEMS  [  ] ROS unobtainable because:    [  ] All other systems negative except as noted below    Constitutional:  [ ] fever [ ] chills  [ ] weight loss  [ ] weakness  Skin:  [ ] rash [ ] phlebitis	  Eyes: [ ] icterus [ ] pain  [ ] discharge	  ENMT: [ ] sore throat  [ ] thrush [ ] ulcers [ ] exudates  Respiratory: [ ] dyspnea [ ] hemoptysis [ ] cough [ ] sputum	  Cardiovascular:  [ ] chest pain [ ] palpitations [ ] edema	  Gastrointestinal:  [ ] nausea [ ] vomiting [ ] diarrhea [ ] constipation [ ] pain	  Genitourinary:  [ ] dysuria [ ] frequency [ ] hematuria [ ] discharge [ ] flank pain  [ ] incontinence  Musculoskeletal:  [ ] myalgias [ ] arthralgias [ ] arthritis  [ ] back pain  Neurological:  [ ] headache [ ] seizures  [ ] confusion/altered mental status    Allergies  codeine (Anaphylaxis)        ANTIMICROBIALS:  cefepime   IVPB 2000 every 12 hours  cefepime   IVPB    valGANciclovir 450 daily  vancomycin  IVPB 1250 every 24 hours      OTHER MEDS:  MEDICATIONS  (STANDING):  acetaminophen     Tablet .. 650 every 6 hours PRN  aluminum hydroxide/magnesium hydroxide/simethicone Suspension 30 every 4 hours PRN  dextrose 50% Injectable 25 once  dextrose 50% Injectable 12.5 once  dextrose 50% Injectable 25 once  dextrose Oral Gel 15 once PRN  glucagon  Injectable 1 once  insulin glargine Injectable (LANTUS) 14 at bedtime  insulin lispro (ADMELOG) corrective regimen sliding scale  three times a day before meals  insulin lispro (ADMELOG) corrective regimen sliding scale  at bedtime  insulin lispro Injectable (ADMELOG) 10 three times a day before meals  melatonin 3 at bedtime PRN  mycophenolate mofetil 250 <User Schedule>  ondansetron Injectable 4 every 8 hours PRN  pantoprazole    Tablet 40 before breakfast  predniSONE   Tablet 10 daily  sertraline 100 daily      Vital Signs Last 24 Hrs  T(C): 36.4 (01 Sep 2022 12:00), Max: 37.4 (01 Sep 2022 01:04)  T(F): 97.5 (01 Sep 2022 12:00), Max: 99.3 (01 Sep 2022 01:04)  HR: 67 (01 Sep 2022 12:00) (67 - 86)  BP: 150/79 (01 Sep 2022 12:00) (123/65 - 156/76)  BP(mean): 103 (01 Sep 2022 12:00) (103 - 103)  RR: 18 (01 Sep 2022 12:00) (17 - 18)  SpO2: 98% (01 Sep 2022 12:00) (94% - 98%)    Parameters below as of 01 Sep 2022 12:00  Patient On (Oxygen Delivery Method): room air        PHYSICAL EXAMINATION:  General: Alert and Awake, NAD  HEENT: PERRL, EOMI  Neck: Supple  Cardiac: RRR, No M/R/G  Resp: CTAB, No Wh/Rh/Ra  Abdomen: NBS, NT/ND, No HSM, No rigidity or guarding  MSK: No LE edema. No Calf tenderness  : No bob  Skin: No rashes or lesions. Skin is warm and dry to the touch.   Neuro: Alert and Awake. CN 2-12 Grossly intact. Moves all four extremities spontaneously.  Psych: Calm, Pleasant, Cooperative                          8.1    3.32  )-----------( 230      ( 01 Sep 2022 06:54 )             25.6       09-01    138  |  107  |  29<H>  ----------------------------<  153<H>  3.6   |  18<L>  |  1.86<H>    Ca    8.8      01 Sep 2022 06:54  Phos  2.8     09-01  Mg     1.9     09-01    TPro  6.6  /  Alb  3.3  /  TBili  0.2  /  DBili  x   /  AST  16  /  ALT  23  /  AlkPhos  144<H>  09-01          MICROBIOLOGY:  v  .Abscess right foot  08-30-22   Few Staphylococcus aureus  Few Coag Negative Staphylococcus "Susceptibilities not performed"  Few Corynebacterium species "Susceptibilities not performed"  Few Streptococcus agalactiae (Group B) isolated  Group B streptococci are susceptible to ampicillin,  penicillin and cefazolin, but may be resistant to  erythromycin and clindamycin.  Recommendations for intrapartum prophylaxis for Group B  streptococci are penicillin or ampicillin.  --  --      .Blood Blood-Peripheral  08-30-22   No growth to date.  --  --      .Blood Blood-Peripheral  08-30-22   No growth to date.  --  --          CMVPCR Log: Det <1.54 Assay Dynamic Range: 34.5 to 1.0E+07 IU/mL (1.54 to 7.00 Log10 IU/mL)  Assay lower limit of quantification (LLOQ) is 34.5 IU/mL (1.54 Log10  IU/mL)  CMV DNA detected below the LLOQ will be reported as Detected < 34.5 IU/mL  (<1.54 Log10 IU/mL)  [facility] Cytomegalovirus (CMV) is an FDA-cleared quantitative test that  enables the detection and quantitation of CMV DNA in EDTA plasma of  infected transplant patients on the marcell 8800 system. This test was  verified by [employer]. Results should be interpreted  with consideration of all clinical findings and laboratory findings and  should not form the sole basis for a diagnosis or treatment decision. [id number]/mL (08-31 @ 06:33)    Clostridium difficile GDH Toxins A&amp;B, EIA:   Negative (08-30-22 @ 14:20)  Clostridium difficile GDH Interpretation: Negative for toxigenic C. Difficile.  This specimen is negative for C.  Difficile glutamate dehydrogenase (GDH) antigen and negative for C.  Difficile Toxins A & B, by EIA.  GDH is a highly sensitive screening  marker for C. Difficile that is produced in large amounts by all C.  Difficile strains, both toxigenic and nontoxigenic.  This assay has not  been validated as a test of cure.  Repeat testing during the same episode  of diarrhea is of limited value and is discouraged.  The results of this  assay should always be interpreted in conjunction with pateint's clinical  history. (08-30-22 @ 14:20)      RADIOLOGY:    <The imaging below has been reviewed and visualized by me independently. Findings as detailed in report below> Follow Up:  Toe Osteomyelitis    Interval History: afebrile. no acute overnight events.     REVIEW OF SYSTEMS  [  ] ROS unobtainable because:    [ x ] All other systems negative except as noted below    Constitutional:  [ ] fever [ ] chills  [ ] weight loss  [ ] weakness  Skin:  [ ] rash [ ] phlebitis	  Eyes: [ ] icterus [ ] pain  [ ] discharge	  ENMT: [ ] sore throat  [ ] thrush [ ] ulcers [ ] exudates  Respiratory: [ ] dyspnea [ ] hemoptysis [ ] cough [ ] sputum	  Cardiovascular:  [ ] chest pain [ ] palpitations [ ] edema	  Gastrointestinal:  [ ] nausea [ ] vomiting [ ] diarrhea [ ] constipation [ ] pain	  Genitourinary:  [ ] dysuria [ ] frequency [ ] hematuria [ ] discharge [ ] flank pain  [ ] incontinence  Musculoskeletal:  [ ] myalgias [ ] arthralgias [ ] arthritis  [ ] back pain  Neurological:  [ ] headache [ ] seizures  [ ] confusion/altered mental status    Allergies  codeine (Anaphylaxis)        ANTIMICROBIALS:  cefepime   IVPB 2000 every 12 hours  cefepime   IVPB    valGANciclovir 450 daily  vancomycin  IVPB 1250 every 24 hours      OTHER MEDS:  MEDICATIONS  (STANDING):  acetaminophen     Tablet .. 650 every 6 hours PRN  aluminum hydroxide/magnesium hydroxide/simethicone Suspension 30 every 4 hours PRN  dextrose 50% Injectable 25 once  dextrose 50% Injectable 12.5 once  dextrose 50% Injectable 25 once  dextrose Oral Gel 15 once PRN  glucagon  Injectable 1 once  insulin glargine Injectable (LANTUS) 14 at bedtime  insulin lispro (ADMELOG) corrective regimen sliding scale  three times a day before meals  insulin lispro (ADMELOG) corrective regimen sliding scale  at bedtime  insulin lispro Injectable (ADMELOG) 10 three times a day before meals  melatonin 3 at bedtime PRN  mycophenolate mofetil 250 <User Schedule>  ondansetron Injectable 4 every 8 hours PRN  pantoprazole    Tablet 40 before breakfast  predniSONE   Tablet 10 daily  sertraline 100 daily      Vital Signs Last 24 Hrs  T(C): 36.4 (01 Sep 2022 12:00), Max: 37.4 (01 Sep 2022 01:04)  T(F): 97.5 (01 Sep 2022 12:00), Max: 99.3 (01 Sep 2022 01:04)  HR: 67 (01 Sep 2022 12:00) (67 - 86)  BP: 150/79 (01 Sep 2022 12:00) (123/65 - 156/76)  BP(mean): 103 (01 Sep 2022 12:00) (103 - 103)  RR: 18 (01 Sep 2022 12:00) (17 - 18)  SpO2: 98% (01 Sep 2022 12:00) (94% - 98%)    Parameters below as of 01 Sep 2022 12:00  Patient On (Oxygen Delivery Method): room air    PHYSICAL EXAMINATION:  General: Alert and Awake, NAD  Cardiac: RRR, No M/R/G  Resp: CTAB, No Wh/Rh/Ra  Abdomen: NBS, NT/ND, No HSM, No rigidity or guarding  MSK: Dressing over RLE 1st digit. No LE edema. No stigmata of IE. No evidence of phlebitis. No evidence of synovitis.  : No bob  Skin: Findings per MSK. Skin is warm and dry to the touch.   Neuro: Alert and Awake. CN 2-12 Grossly intact. Moves all four extremities spontaneously.  Psych: Calm, Pleasant, Cooperative                            8.1    3.32  )-----------( 230      ( 01 Sep 2022 06:54 )             25.6       09-01    138  |  107  |  29<H>  ----------------------------<  153<H>  3.6   |  18<L>  |  1.86<H>    Ca    8.8      01 Sep 2022 06:54  Phos  2.8     09-01  Mg     1.9     09-01    TPro  6.6  /  Alb  3.3  /  TBili  0.2  /  DBili  x   /  AST  16  /  ALT  23  /  AlkPhos  144<H>  09-01    MICROBIOLOGY:    .Abscess right foot  08-30-22   Few Staphylococcus aureus  Few Coag Negative Staphylococcus "Susceptibilities not performed"  Few Corynebacterium species "Susceptibilities not performed"  Few Streptococcus agalactiae (Group B) isolated  Group B streptococci are susceptible to ampicillin,  penicillin and cefazolin, but may be resistant to  erythromycin and clindamycin.  Recommendations for intrapartum prophylaxis for Group B  streptococci are penicillin or ampicillin.  --  --      .Blood Blood-Peripheral  08-30-22   No growth to date.  --  --      .Blood Blood-Peripheral  08-30-22   No growth to date.  --  --          CMVPCR Log: Det <1.54 Assay Dynamic Range: 34.5 to 1.0E+07 IU/mL (1.54 to 7.00 Log10 IU/mL)  Assay lower limit of quantification (LLOQ) is 34.5 IU/mL (1.54 Log10  IU/mL)  CMV DNA detected below the LLOQ will be reported as Detected < 34.5 IU/mL  (<1.54 Log10 IU/mL)  [facility] Cytomegalovirus (CMV) is an FDA-cleared quantitative test that  enables the detection and quantitation of CMV DNA in EDTA plasma of  infected transplant patients on the marcell 8800 system. This test was  verified by [employer]. Results should be interpreted  with consideration of all clinical findings and laboratory findings and  should not form the sole basis for a diagnosis or treatment decision. [id number]/mL (08-31 @ 06:33)    Clostridium difficile GDH Toxins A&amp;B, EIA:   Negative (08-30-22 @ 14:20)  Clostridium difficile GDH Interpretation: Negative for toxigenic C. Difficile.  This specimen is negative for C.  Difficile glutamate dehydrogenase (GDH) antigen and negative for C.  Difficile Toxins A & B, by EIA.  GDH is a highly sensitive screening  marker for C. Difficile that is produced in large amounts by all C.  Difficile strains, both toxigenic and nontoxigenic.  This assay has not  been validated as a test of cure.  Repeat testing during the same episode  of diarrhea is of limited value and is discouraged.  The results of this  assay should always be interpreted in conjunction with pateint's clinical  history. (08-30-22 @ 14:20)      RADIOLOGY:    <The imaging below has been reviewed and visualized by me independently. Findings as detailed in report below>    < from: Xray Chest 1 View- PORTABLE-Urgent (Xray Chest 1 View- PORTABLE-Urgent .) (09.01.22 @ 11:54) >  IMPRESSION:  Low lung volumes. Clear lungs.    < end of copied text >

## 2022-09-01 NOTE — PROGRESS NOTE ADULT - ASSESSMENT
67 y/o M w/ R hallux wound to level of bone  - Pt seen and evaluated  - Afebrile, no leukocytosis  - right foot: distal hallux wound to bone w/ fibrotic base, malodor, erythema extending from hallux to forefoot, no soft tissue crepitations, no drainage  - R foot XR: OM of distal hallux distal phalanx, no tracking soft tissue air  - R foot MR: OM of hallux distal phalanx  - R foot wound culture pending  - ID recs appreciated  - Third discussion with patient held bedside regarding most definitive treatment for bone infection of his great toe. It was reiterated that long term IV abx in the setting of liver transplant and CKD is not without risks. It was also communicated that it is very unlikely for long term IV abx to cure a bone infection. Upon discussion with his transplant doctor (Dr. Mo) and discussion with podiatry today, Pt is now amenable for right foot partial first ray amputation  - Booked for Friday 9/2 at 2:30 PM with Dr. Noyola   - NPO after midnight   - please document medical clearance for right foot partial first ray amputation  - Seen with attending   67 y/o M w/ R hallux wound to level of bone  - Pt seen and evaluated  - Afebrile, no leukocytosis  - right foot: distal hallux wound to bone w/ fibrotic base, malodor, erythema extending from hallux to forefoot, no soft tissue crepitations, no drainage  - R foot XR: OM of distal hallux distal phalanx, no tracking soft tissue air  - R foot MR: OM of hallux distal phalanx  - R foot wound culture pending  - ID recs appreciated  - Third discussion with patient held bedside regarding most definitive treatment for bone infection of his great toe. It was reiterated that long term IV abx in the setting of liver transplant and CKD is not without risks. It was also communicated that it is very unlikely for long term IV abx to cure a bone infection. Upon discussion with his transplant doctor (Dr. Mo) and discussion with podiatry today, Pt is now amenable for right foot partial first ray amputation  - Booked for Friday 9/2 at 2:30 PM with Dr. Noyola   - NPO after midnight   - Documented medical clearance for right foot partial first ray amputation (appreciated)   - Seen with attending   67 y/o M w/ R hallux wound to level of bone  - Pt seen and evaluated  - Afebrile, no leukocytosis  - right foot: distal hallux wound to bone w/ fibrotic base, malodor, erythema extending from hallux to forefoot, no soft tissue crepitations, no drainage  - R foot XR: OM of distal hallux distal phalanx, no tracking soft tissue air  - R foot MR: OM of hallux distal phalanx  - R foot wound culture pending  - ID recs appreciated  - Third discussion with patient held bedside regarding most definitive treatment for bone infection of his great toe. It was reiterated that long term IV abx in the setting of liver transplant and CKD is not without risks. It was also communicated that it is very unlikely for long term IV abx to cure a bone infection. Upon discussion with his transplant doctor (Dr. Mo) and discussion with podiatry today, Pt is now amenable for right foot partial first ray amputation  - Booked for Friday 9/2 at 2:30 PM with Dr. Noyola   - NPO after midnight, please COVID swab for OR    - Documented medical clearance for right foot partial first ray amputation (appreciated)   - Seen with attending

## 2022-09-01 NOTE — PROGRESS NOTE ADULT - ATTENDING COMMENTS
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 S/p cefepime/vancomycin (8/30-9/1), changed to Unasyn (9/1- ) per Transplant ID. Planned for right foot partial first ray amputation tomorrow by Podiatry. Everolimus being held due to increased risk of impaired wound healing and he cannot be switched to CNI given prior severe neurotoxicity even with minimal dosing. Given stable hepatic allograft function, will monitor hepatic allograft function on prednisone 10 mg po daily (started this admission instead of everolimus) and MMF for now, currently at 250 mg po bid. Continuing Valcyte 450 mg po daily (renally dosed) for low level CMV viremia.    Please don't hesitate to call with any questions or concerns.    Letitia Mo M.D., Ph.D.  Transplant Hepatology

## 2022-09-01 NOTE — PROGRESS NOTE ADULT - ASSESSMENT
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  Patient has history of LT in July 2020 due to JEAN cirrhosis.  Post-LT course c/b VRE bacteremia, CNI toxicity to both tacrolimus and cyclosporine, and is currently maintained on everolimus and CellCept.  Patient endorses recent trauma ~1 week PTA where toenail was "sheared off" from getting tangled in the bedsheet.  Subsequently, he noticed worsening swelling and erythema of the right great toe in the setting of known diabetic neuropathy. He denies any overt drainage or purulence.  Additionally, he endorses intermittent watery bowel movements over the past 1 week.    # Right toe osteomyelitis  # Diarrhea  # JEAN cirrhosis s/p LT 7/2/2020: maintained on everolimus and CellCept, had elevated liver chemistreies in December 2021 s/p liver biopsy which was negative for evidence of rejection but revealed NRH  # Low level CMV viremia 6/28/2022  # CKD  - C diff negative    Recommendations:  -NPO after midnight for OR tomorrow  -trend clinical symptoms, exam findings, vital signs, CBC, CMP, INR  -Immunosuppression:       -Active/Maintenance:  home everolimus 3mg BID and CellCept 250mg BID; hold CellCept and Everolimus for now; patient at risk for impaired wound healing with everolimus however limited treatment options given significant CNI neurotoxicity.         -Levels: Everolimus, Whole Blood Result: 8.9 ng/mL (06-29-22 @ 06:20);   -Antibiotics: cefepime IVPB 2000 milliGRAM(s) IV  -Started Valcyte 450 mg po daily, recheck levels in 1 week  -appreciate recommendations from Transplant ID  -for watery diarrhea, f/u GI PCR, and CMV (detected < 34.5)  -management of osteomyelitis and remainder per primary team  -f/u Everolimus levels  He is medically cleared to proceed with this low risk surgery.    Preliminary note until signed by Attending.    Thank you for involving us in this patient's care.    Charlotte Alcocer MD  Gastroenterology/Hepatology Fellow, PGY-VI    NON-URGENT CONSULTS:  Please email [email or web address]  AT NIGHT AND ON WEEKENDS:  Contact on-call GI fellow via answering service ([phone]) from 5pm-8am and on weekends/holidays  MONDAY-FRIDAY 8AM-5PM:  Pager# [phone] ([facility])  GI Phone# [phone] ([facility])

## 2022-09-01 NOTE — PROGRESS NOTE ADULT - ASSESSMENT
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     COVID19 PCR (8/30) Negative.   ESR (8/30) 106.   CRP (8/30) 180.     XR Foot (8/30) Questionable bony erosion of the tuft of the first distal phalanx.    On 8/30 s/p removal of toenail with nail bed wound that extended to bone.     MRI RLE (8/31) osteomyelitis of the distal phalanx tip with likely erosions within the plantar distal tip of the distal phalanx.    #RLE 1st digit OM, Positive Culture Finding (Polymicrobial including MSSA, GBS)  --Stop Vancomycin and Cefepime  --Recommend Unasyn 3g IV Q6H  --Planned for partial first ray amputation with podiatry  --Continue to follow CBC with diff  --Continue to follow temperature curve  --Follow up on preliminary blood cultures  --Follow up on preliminary wound culture    #Liver Transplant Recipient, Recent CMV Viremia  CMV PCR (8/31) < 34.5  --Agree with initiation of Valcyte 900 mg PO Q24H (prophylaxis dosing)  --Check CMV PCR on 9/8/22    I will continue to follow. Please feel free to contact me with any further questions.    Eusebio Pérez M.D.  [facility] Division of Infectious Disease  8AM-5PM Monday - Friday: Available on [employer] Teams  After Hours and Holidays (or if no response on [employer] Teams): Please contact the Infectious Diseases Office at [phone]    The above assessment and plan were discussed with [facility] PA

## 2022-09-02 NOTE — DISCHARGE NOTE PROVIDER - CARE PROVIDERS DIRECT ADDRESSES
[email or web address]

## 2022-09-02 NOTE — BRIEF OPERATIVE NOTE - OPERATION/FINDINGS
patient is s/p right foot partial hallux amputation: bone at proximal resection was hard and of good quality. EBL 30 cc, closed with 3.0 nylon

## 2022-09-02 NOTE — PRE-ANESTHESIA EVALUATION ADULT - NSRADCARDRESULTSFT_GEN_ALL_CORE
12/21/2021 TTE Conclusions:  Sonographer's note: patient had no IV access available.  Could not administer an ultrasound enhancing agent.  Normal left ventricular systolic function. Probably no  segmental wall motion abnormalities.

## 2022-09-02 NOTE — PROGRESS NOTE ADULT - SUBJECTIVE AND OBJECTIVE BOX
Chief Complaint:  Patient is a 66y old  Male who presents with a chief complaint of R toe infection (02 Sep 2022 06:51)       Interval Events:   Afebrile, stable  Increase in Creatinine today  Planning for OR today    Hospital Medications:  acetaminophen     Tablet .. 650 milliGRAM(s) Oral every 6 hours PRN  aluminum hydroxide/magnesium hydroxide/simethicone Suspension 30 milliLiter(s) Oral every 4 hours PRN  ampicillin/sulbactam  IVPB      ampicillin/sulbactam  IVPB 3 Gram(s) IV Intermittent every 6 hours  artificial tears (preservative free) Ophthalmic Solution 1 Drop(s) Both EYES two times a day  carbamide peroxide Otic Solution 1 Drop(s) Both Ears two times a day  chlorhexidine 2% Cloths 1 Application(s) Topical daily  dextrose 5%. 1000 milliLiter(s) IV Continuous <Continuous>  dextrose 5%. 1000 milliLiter(s) IV Continuous <Continuous>  dextrose 50% Injectable 25 Gram(s) IV Push once  dextrose 50% Injectable 12.5 Gram(s) IV Push once  dextrose 50% Injectable 25 Gram(s) IV Push once  dextrose Oral Gel 15 Gram(s) Oral once PRN  glucagon  Injectable 1 milliGRAM(s) IntraMuscular once  insulin glargine Injectable (LANTUS) 14 Unit(s) SubCutaneous at bedtime  insulin lispro (ADMELOG) corrective regimen sliding scale   SubCutaneous three times a day before meals  insulin lispro (ADMELOG) corrective regimen sliding scale   SubCutaneous at bedtime  insulin lispro Injectable (ADMELOG) 10 Unit(s) SubCutaneous three times a day before meals  melatonin 3 milliGRAM(s) Oral at bedtime PRN  mycophenolate mofetil 250 milliGRAM(s) Oral <User Schedule>  ondansetron Injectable 4 milliGRAM(s) IV Push every 8 hours PRN  pantoprazole    Tablet 40 milliGRAM(s) Oral before breakfast  predniSONE   Tablet 10 milliGRAM(s) Oral daily  sertraline 100 milliGRAM(s) Oral daily  valGANciclovir 450 milliGRAM(s) Oral daily      ROS:   Complete and normal except as mentioned above.    PHYSICAL EXAM:   Vital Signs:  Vital Signs Last 24 Hrs  T(C): 37 (02 Sep 2022 09:00), Max: 37 (02 Sep 2022 09:00)  T(F): 98.6 (02 Sep 2022 09:00), Max: 98.6 (02 Sep 2022 09:00)  HR: 61 (02 Sep 2022 09:00) (61 - 84)  BP: 132/66 (02 Sep 2022 09:00) (130/74 - 150/79)  BP(mean): 98 (01 Sep 2022 16:00) (98 - 103)  RR: 18 (02 Sep 2022 09:00) (17 - 19)  SpO2: 98% (02 Sep 2022 09:00) (93% - 99%)    Parameters below as of 02 Sep 2022 09:00  Patient On (Oxygen Delivery Method): room air      Daily     Daily     GENERAL: no acute distress  NEURO: alert  HEENT: NCAT, no conjunctival pallor appreciated  CHEST: no respiratory distress, no accessory muscle use  CARDIAC: regular rate, +S1/S2  ABDOMEN: soft, nontender, no rebound or guarding  EXTREMITIES: warm, well perfused; right foot digit dressing c/d/i  SKIN: no lesions noted    LABS: reviewed                        8.2    3.23  )-----------( 224      ( 02 Sep 2022 06:59 )             26.7     09-02    139  |  108  |  30<H>  ----------------------------<  199<H>  3.8   |  20<L>  |  2.20<H>    Ca    8.9      02 Sep 2022 06:59  Phos  2.8     09-01  Mg     1.9     09-01    TPro  6.3  /  Alb  2.7<L>  /  TBili  0.1<L>  /  DBili  x   /  AST  14  /  ALT  18  /  AlkPhos  133<H>  09-02    LIVER FUNCTIONS - ( 02 Sep 2022 06:59 )  Alb: 2.7 g/dL / Pro: 6.3 g/dL / ALK PHOS: 133 U/L / ALT: 18 U/L / AST: 14 U/L / GGT: x             Interval Diagnostic Studies: see sunrise for full report

## 2022-09-02 NOTE — PRE-ANESTHESIA EVALUATION ADULT - NSANTHPEFT_GEN_ALL_CORE
GENERAL: NAD  HEAD:  Atraumatic, Normocephalic  CHEST/LUNG: Clear to auscultation bilaterally  HEART: Normal S1/S2  PSYCH: AAOx3  NEUROLOGY: non-focal

## 2022-09-02 NOTE — DISCHARGE NOTE PROVIDER - NSDCFUSCHEDAPPT_GEN_ALL_CORE_FT
[facility]  [facility]  Scheduled Appointment: 09/14/2022    [facility]  Scheduled Appointment: 09/28/2022    Pedro Luis Selby  [facility]  [facility]  Scheduled Appointment: 10/05/2022    [facility]  [facility]  Scheduled Appointment: 10/12/2022    [facility]  [facility]  Scheduled Appointment: 10/26/2022

## 2022-09-02 NOTE — DISCHARGE NOTE PROVIDER - CARE PROVIDER_API CALL
Miguel Noyola (DPM)  Podiatric Medicine and Surgery  [address], [place] [address]  Phone: [phone]  Fax: [phone]  Follow Up Time:     Yomi Medina (MBBS; MS)  Gastroenterology; Internal Medicine; Transplant Hepatology  [address] [place] [address]  Phone: [phone]  Fax: [phone]  Follow Up Time:

## 2022-09-02 NOTE — PROGRESS NOTE ADULT - SUBJECTIVE AND OBJECTIVE BOX
Patient is a 66y old  Male who presents with a chief complaint of R toe infection (01 Sep 2022 15:23)      INTERVAL HPI/OVERNIGHT EVENTS:   Pt is scheduled for right foot partial 1st ray resection with Dr. Segundo at 230pm. Patient is aware of procedure and is NPO since midnight.    MEDICATIONS  (STANDING):  ampicillin/sulbactam  IVPB      ampicillin/sulbactam  IVPB 3 Gram(s) IV Intermittent every 6 hours  artificial tears (preservative free) Ophthalmic Solution 1 Drop(s) Both EYES two times a day  carbamide peroxide Otic Solution 1 Drop(s) Both Ears two times a day  chlorhexidine 2% Cloths 1 Application(s) Topical daily  dextrose 5%. 1000 milliLiter(s) (50 mL/Hr) IV Continuous <Continuous>  dextrose 5%. 1000 milliLiter(s) (100 mL/Hr) IV Continuous <Continuous>  dextrose 50% Injectable 25 Gram(s) IV Push once  dextrose 50% Injectable 12.5 Gram(s) IV Push once  dextrose 50% Injectable 25 Gram(s) IV Push once  glucagon  Injectable 1 milliGRAM(s) IntraMuscular once  insulin glargine Injectable (LANTUS) 14 Unit(s) SubCutaneous at bedtime  insulin lispro (ADMELOG) corrective regimen sliding scale   SubCutaneous three times a day before meals  insulin lispro (ADMELOG) corrective regimen sliding scale   SubCutaneous at bedtime  insulin lispro Injectable (ADMELOG) 10 Unit(s) SubCutaneous three times a day before meals  mycophenolate mofetil 250 milliGRAM(s) Oral <User Schedule>  pantoprazole    Tablet 40 milliGRAM(s) Oral before breakfast  predniSONE   Tablet 10 milliGRAM(s) Oral daily  sertraline 100 milliGRAM(s) Oral daily  valGANciclovir 450 milliGRAM(s) Oral daily    MEDICATIONS  (PRN):  acetaminophen     Tablet .. 650 milliGRAM(s) Oral every 6 hours PRN Temp greater or equal to 38C (100.4F), Mild Pain (1 - 3)  aluminum hydroxide/magnesium hydroxide/simethicone Suspension 30 milliLiter(s) Oral every 4 hours PRN Dyspepsia  dextrose Oral Gel 15 Gram(s) Oral once PRN Blood Glucose LESS THAN 70 milliGRAM(s)/deciliter  melatonin 3 milliGRAM(s) Oral at bedtime PRN Insomnia  ondansetron Injectable 4 milliGRAM(s) IV Push every 8 hours PRN Nausea and/or Vomiting      Allergies    codeine (Anaphylaxis)    Intolerances        Vital Signs Last 24 Hrs  T(C): 36.9 (02 Sep 2022 05:50), Max: 36.9 (01 Sep 2022 22:38)  T(F): 98.4 (02 Sep 2022 05:50), Max: 98.4 (01 Sep 2022 22:38)  HR: 74 (02 Sep 2022 05:50) (67 - 84)  BP: 130/74 (02 Sep 2022 05:50) (123/65 - 150/79)  BP(mean): 98 (01 Sep 2022 16:00) (98 - 103)  RR: 17 (02 Sep 2022 05:50) (17 - 19)  SpO2: 99% (02 Sep 2022 05:50) (93% - 99%)    Parameters below as of 02 Sep 2022 05:50  Patient On (Oxygen Delivery Method): room air        LABS:                        8.1    3.32  )-----------( 230      ( 01 Sep 2022 06:54 )             25.6     09-01    138  |  107  |  29<H>  ----------------------------<  153<H>  3.6   |  18<L>  |  1.86<H>    Ca    8.8      01 Sep 2022 06:54  Phos  2.8     09-01  Mg     1.9     09-01    TPro  6.6  /  Alb  3.3  /  TBili  0.2  /  DBili  x   /  AST  16  /  ALT  23  /  AlkPhos  144<H>  09-01    PT/INR - ( 01 Sep 2022 06:54 )   PT: 13.4 sec;   INR: 1.15 ratio         PTT - ( 01 Sep 2022 06:54 )  PTT:29.4 sec    CAPILLARY BLOOD GLUCOSE      POCT Blood Glucose.: 202 mg/dL (02 Sep 2022 06:38)  POCT Blood Glucose.: 139 mg/dL (01 Sep 2022 21:40)  POCT Blood Glucose.: 186 mg/dL (01 Sep 2022 17:02)  POCT Blood Glucose.: 200 mg/dL (01 Sep 2022 12:46)  POCT Blood Glucose.: 185 mg/dL (01 Sep 2022 08:51)      RADIOLOGY & ADDITIONAL TESTS:    Plan:   To OR today at 230pm with Dr. Noyola for right foot partial 1st ray resection .   CXR on sunrise.  EKG on sunrise.  Medical/Cardiac clearance since 8/31 and documented in chart.  Consent signed and in chart.  Procedure was explained to patient in detail. All alternatives, risks and complications were discussed. All questions answered.

## 2022-09-02 NOTE — PROGRESS NOTE ADULT - ASSESSMENT
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     COVID19 PCR (8/30) Negative.   ESR (8/30) 106.   CRP (8/30) 180.     XR Foot (8/30) Questionable bony erosion of the tuft of the first distal phalanx.    On 8/30 s/p removal of toenail with nail bed wound that extended to bone.     MRI RLE (8/31) osteomyelitis of the distal phalanx tip with likely erosions within the plantar distal tip of the distal phalanx.    #RLE 1st digit OM, Positive Culture Finding (Polymicrobial including MSSA, GBS)  --Continue Unasyn 3g IV Q6H  --Planned for partial first ray amputation with podiatry  --Continue to follow CBC with diff  --Continue to follow temperature curve  --Follow up on preliminary blood cultures  --Follow up on preliminary wound culture    #Liver Transplant Recipient, Recent CMV Viremia  CMV PCR (8/31) < 34.5  --Continue Valcyte 900 mg PO Q24H (prophylaxis dosing)  --Check CMV PCR on 9/8/22    I will continue to follow. Please feel free to contact me with any further questions.    Eusebio Pérez M.D.  [facility] Division of Infectious Disease  8AM-5PM Monday - Friday: Available on [employer] Teams  After Hours and Holidays (or if no response on [employer] Teams): Please contact the Infectious Diseases Office at [phone]    The above assessment and plan were discussed with Dr Letitia Mo

## 2022-09-02 NOTE — PRE-ANESTHESIA EVALUATION ADULT - NSANTHPMHFT_GEN_ALL_CORE
JEAN cirrhosis s/p OLT 7/2/2020 with course c/b VRE bacteremia. 1 week PTA toenail was sheared off when getting tangled in the bedsheet and the patient noticed swelling and erythema.  Subsequently diagnosed with osteomyelitis by podiatry.  Incidentally patient has had one week of loose stools that are non-bloody    Transplant hepatology following, notes in SCM: "He is medically cleared to proceed with this low risk surgery"

## 2022-09-02 NOTE — BRIEF OPERATIVE NOTE - COMMENTS
low concern for residual bone infection, low concern for viability  podiatry plan  -f/u OR data for 48 hrs of no growth  -f/u ID recs  -Stable for d/c from podiatry after 48 hrs of no growth OR data and ID final recs

## 2022-09-02 NOTE — DISCHARGE NOTE PROVIDER - NSDCFUADDAPPT_GEN_ALL_CORE_FT
Podiatry Discharge Instructions:  Follow up: Please follow up with Dr. Noyola within 1 week of discharge from the hospital, please call [phone] for appointment and discuss that you recently were seen in the hospital.  Wound Care: Please leave your dressing clean dry intact until your follow up appointment   Weight bearing: Please weight bear as tolerated in a surgical shoe.  Antibiotics: Please continue as instructed. Follow-up with Dr. Medina in one week with lab check    Podiatry Discharge Instructions:  Follow up: Please follow up with Dr. Noyola within 1 week of discharge from the hospital, please call [phone] for appointment and discuss that you recently were seen in the hospital.  Wound Care: Please leave your dressing clean dry intact until your follow up appointment   Weight bearing: Please weight bear as tolerated in a surgical shoe.  Antibiotics: Please continue as instructed.

## 2022-09-02 NOTE — PROGRESS NOTE ADULT - SUBJECTIVE AND OBJECTIVE BOX
Follow Up:      Interval History:    REVIEW OF SYSTEMS  [  ] ROS unobtainable because:    [  ] All other systems negative except as noted below    Constitutional:  [ ] fever [ ] chills  [ ] weight loss  [ ] weakness  Skin:  [ ] rash [ ] phlebitis	  Eyes: [ ] icterus [ ] pain  [ ] discharge	  ENMT: [ ] sore throat  [ ] thrush [ ] ulcers [ ] exudates  Respiratory: [ ] dyspnea [ ] hemoptysis [ ] cough [ ] sputum	  Cardiovascular:  [ ] chest pain [ ] palpitations [ ] edema	  Gastrointestinal:  [ ] nausea [ ] vomiting [ ] diarrhea [ ] constipation [ ] pain	  Genitourinary:  [ ] dysuria [ ] frequency [ ] hematuria [ ] discharge [ ] flank pain  [ ] incontinence  Musculoskeletal:  [ ] myalgias [ ] arthralgias [ ] arthritis  [ ] back pain  Neurological:  [ ] headache [ ] seizures  [ ] confusion/altered mental status    Allergies  codeine (Anaphylaxis)        ANTIMICROBIALS:  ampicillin/sulbactam  IVPB 3 every 6 hours  valGANciclovir 450 daily      OTHER MEDS:  MEDICATIONS  (STANDING):  acetaminophen     Tablet .. 650 every 6 hours PRN  aluminum hydroxide/magnesium hydroxide/simethicone Suspension 30 every 4 hours PRN  dextrose 50% Injectable 25 once  dextrose 50% Injectable 12.5 once  dextrose 50% Injectable 25 once  dextrose Oral Gel 15 once PRN  glucagon  Injectable 1 once  heparin   Injectable 5000 every 12 hours  insulin glargine Injectable (LANTUS) 14 at bedtime  insulin lispro (ADMELOG) corrective regimen sliding scale  three times a day before meals  insulin lispro (ADMELOG) corrective regimen sliding scale  at bedtime  insulin lispro Injectable (ADMELOG) 10 three times a day before meals  melatonin 3 at bedtime PRN  mycophenolate mofetil 250 <User Schedule>  ondansetron Injectable 4 every 8 hours PRN  pantoprazole    Tablet 40 before breakfast  predniSONE   Tablet 10 daily  sertraline 100 daily      Vital Signs Last 24 Hrs  T(C): 36.6 (02 Sep 2022 17:00), Max: 37.1 (02 Sep 2022 13:00)  T(F): 97.9 (02 Sep 2022 17:00), Max: 98.8 (02 Sep 2022 13:00)  HR: 73 (02 Sep 2022 17:30) (61 - 84)  BP: 153/74 (02 Sep 2022 17:00) (130/74 - 156/69)  BP(mean): 106 (02 Sep 2022 17:00) (100 - 119)  RR: 18 (02 Sep 2022 17:30) (16 - 19)  SpO2: 99% (02 Sep 2022 17:30) (93% - 100%)    Parameters below as of 02 Sep 2022 17:00  Patient On (Oxygen Delivery Method): room air        PHYSICAL EXAMINATION:  General: Alert and Awake, NAD  HEENT: PERRL, EOMI  Neck: Supple  Cardiac: RRR, No M/R/G  Resp: CTAB, No Wh/Rh/Ra  Abdomen: NBS, NT/ND, No HSM, No rigidity or guarding  MSK: No LE edema. No Calf tenderness  : No bob  Skin: No rashes or lesions. Skin is warm and dry to the touch.   Neuro: Alert and Awake. CN 2-12 Grossly intact. Moves all four extremities spontaneously.  Psych: Calm, Pleasant, Cooperative                          8.2    3.23  )-----------( 224      ( 02 Sep 2022 06:59 )             26.7       09-02    139  |  108  |  30<H>  ----------------------------<  199<H>  3.8   |  20<L>  |  2.20<H>    Ca    8.9      02 Sep 2022 06:59  Phos  2.8     09-  Mg     1.9     09-    TPro  6.3  /  Alb  2.7<L>  /  TBili  0.1<L>  /  DBili  x   /  AST  14  /  ALT  18  /  AlkPhos  133<H>  09-02      Urinalysis Basic - ( 02 Sep 2022 11:15 )    Color: Light Yellow / Appearance: Slightly Turbid / S.019 / pH: x  Gluc: x / Ketone: Negative  / Bili: Negative / Urobili: Negative   Blood: x / Protein: 30 mg/dL / Nitrite: Negative   Leuk Esterase: Large / RBC: 4 /hpf /  /HPF   Sq Epi: x / Non Sq Epi: 1 /hpf / Bacteria: Negative        MICROBIOLOGY:  v  .Abscess right foot  22   Culture yields >4 types of aerobic and/or anaerobic bacteria  Call client services within 7 days if further workup is clinically  indicated.  Culture includes  Few Staphylococcus aureus  Few Coag Negative Staphylococcus "Susceptibilities not performed"  Few Corynebacterium species "Susceptibilities not performed"  Few Streptococcus agalactiae (Group B) isolated  Group B streptococci are susceptible to ampicillin,  penicillin and cefazolin, but may be resistant to  erythromycin and clindamycin.  Recommendations for intrapartum prophylaxis for Group B  streptococci are penicillin or ampicillin.  --  Staphylococcus aureus      .Blood Blood-Peripheral  22   No growth to date.  --  --      .Blood Blood-Peripheral  22   No growth to date.  --  --          CMVPCR Log: Det <1.54 Assay Dynamic Range: 34.5 to 1.0E+07 IU/mL (1.54 to 7.00 Log10 IU/mL)  Assay lower limit of quantification (LLOQ) is 34.5 IU/mL (1.54 Log10  IU/mL)  CMV DNA detected below the LLOQ will be reported as Detected < 34.5 IU/mL  (<1.54 Log10 IU/mL)  [facility]ell Cytomegalovirus (CMV) is an FDA-cleared quantitative test that  enables the detection and quantitation of CMV DNA in EDTA plasma of  infected transplant patients on the marcell 8800 system. This test was  verified by [employer]. Results should be interpreted  with consideration of all clinical findings and laboratory findings and  should not form the sole basis for a diagnosis or treatment decision. Mom76EA/mL ( @ 06:33)    Clostridium difficile GDH Toxins A&amp;B, EIA:   Negative (22 @ 14:20)  Clostridium difficile GDH Interpretation: Negative for toxigenic C. Difficile.  This specimen is negative for C.  Difficile glutamate dehydrogenase (GDH) antigen and negative for C.  Difficile Toxins A & B, by EIA.  GDH is a highly sensitive screening  marker for C. Difficile that is produced in large amounts by all C.  Difficile strains, both toxigenic and nontoxigenic.  This assay has not  been validated as a test of cure.  Repeat testing during the same episode  of diarrhea is of limited value and is discouraged.  The results of this  assay should always be interpreted in conjunction with pateint's clinical  history. (22 @ 14:20)      RADIOLOGY:    <The imaging below has been reviewed and visualized by me independently. Findings as detailed in report below> Follow Up:  Toe Osteomyelitis    Interval History: planned for partial amputation of toe today. afebrile.     REVIEW OF SYSTEMS  [  ] ROS unobtainable because:    [ x ] All other systems negative except as noted below    Constitutional:  [ ] fever [ ] chills  [ ] weight loss  [ ] weakness  Skin:  [ ] rash [ ] phlebitis	  Eyes: [ ] icterus [ ] pain  [ ] discharge	  ENMT: [ ] sore throat  [ ] thrush [ ] ulcers [ ] exudates  Respiratory: [ ] dyspnea [ ] hemoptysis [ ] cough [ ] sputum	  Cardiovascular:  [ ] chest pain [ ] palpitations [ ] edema	  Gastrointestinal:  [ ] nausea [ ] vomiting [ ] diarrhea [ ] constipation [ ] pain	  Genitourinary:  [ ] dysuria [ ] frequency [ ] hematuria [ ] discharge [ ] flank pain  [ ] incontinence  Musculoskeletal:  [ ] myalgias [ ] arthralgias [ ] arthritis  [ ] back pain  Neurological:  [ ] headache [ ] seizures  [ ] confusion/altered mental status      Allergies  codeine (Anaphylaxis)        ANTIMICROBIALS:  ampicillin/sulbactam  IVPB 3 every 6 hours  valGANciclovir 450 daily      OTHER MEDS:  MEDICATIONS  (STANDING):  acetaminophen     Tablet .. 650 every 6 hours PRN  aluminum hydroxide/magnesium hydroxide/simethicone Suspension 30 every 4 hours PRN  dextrose 50% Injectable 25 once  dextrose 50% Injectable 12.5 once  dextrose 50% Injectable 25 once  dextrose Oral Gel 15 once PRN  glucagon  Injectable 1 once  heparin   Injectable 5000 every 12 hours  insulin glargine Injectable (LANTUS) 14 at bedtime  insulin lispro (ADMELOG) corrective regimen sliding scale  three times a day before meals  insulin lispro (ADMELOG) corrective regimen sliding scale  at bedtime  insulin lispro Injectable (ADMELOG) 10 three times a day before meals  melatonin 3 at bedtime PRN  mycophenolate mofetil 250 <User Schedule>  ondansetron Injectable 4 every 8 hours PRN  pantoprazole    Tablet 40 before breakfast  predniSONE   Tablet 10 daily  sertraline 100 daily      Vital Signs Last 24 Hrs  T(C): 36.6 (02 Sep 2022 17:00), Max: 37.1 (02 Sep 2022 13:00)  T(F): 97.9 (02 Sep 2022 17:00), Max: 98.8 (02 Sep 2022 13:00)  HR: 73 (02 Sep 2022 17:30) (61 - 84)  BP: 153/74 (02 Sep 2022 17:00) (130/74 - 156/69)  BP(mean): 106 (02 Sep 2022 17:00) (100 - 119)  RR: 18 (02 Sep 2022 17:30) (16 - 19)  SpO2: 99% (02 Sep 2022 17:30) (93% - 100%)    Parameters below as of 02 Sep 2022 17:00  Patient On (Oxygen Delivery Method): room air    PHYSICAL EXAMINATION:  General: Alert and Awake, NAD  Cardiac: RRR, No M/R/G  Resp: CTAB, No Wh/Rh/Ra  Abdomen: NBS, NT/ND, No HSM, No rigidity or guarding  MSK: Dressing over RLE 1st digit. No LE edema. No stigmata of IE. No evidence of phlebitis. No evidence of synovitis.  : No bob  Skin: Findings per MSK. Skin is warm and dry to the touch.   Neuro: Alert and Awake. CN 2-12 Grossly intact. Moves all four extremities spontaneously.  Psych: Calm, Pleasant, Cooperative                          8.2    3.23  )-----------( 224      ( 02 Sep 2022 06:59 )             26.7       09-02    139  |  108  |  30<H>  ----------------------------<  199<H>  3.8   |  20<L>  |  2.20<H>    Ca    8.9      02 Sep 2022 06:59  Phos  2.8     09-  Mg     1.9     09-    TPro  6.3  /  Alb  2.7<L>  /  TBili  0.1<L>  /  DBili  x   /  AST  14  /  ALT  18  /  AlkPhos  133<H>        Urinalysis Basic - ( 02 Sep 2022 11:15 )    Color: Light Yellow / Appearance: Slightly Turbid / S.019 / pH: x  Gluc: x / Ketone: Negative  / Bili: Negative / Urobili: Negative   Blood: x / Protein: 30 mg/dL / Nitrite: Negative   Leuk Esterase: Large / RBC: 4 /hpf /  /HPF   Sq Epi: x / Non Sq Epi: 1 /hpf / Bacteria: Negative        MICROBIOLOGY:  v  .Abscess right foot  22   Culture yields >4 types of aerobic and/or anaerobic bacteria  Call client services within 7 days if further workup is clinically  indicated.  Culture includes  Few Staphylococcus aureus  Few Coag Negative Staphylococcus "Susceptibilities not performed"  Few Corynebacterium species "Susceptibilities not performed"  Few Streptococcus agalactiae (Group B) isolated  Group B streptococci are susceptible to ampicillin,  penicillin and cefazolin, but may be resistant to  erythromycin and clindamycin.  Recommendations for intrapartum prophylaxis for Group B  streptococci are penicillin or ampicillin.  --  Staphylococcus aureus      .Blood Blood-Peripheral  22   No growth to date.  --  --      .Blood Blood-Peripheral  22   No growth to date.  --  --          CMVPCR Log: Det <1.54 Assay Dynamic Range: 34.5 to 1.0E+07 IU/mL (1.54 to 7.00 Log10 IU/mL)  Assay lower limit of quantification (LLOQ) is 34.5 IU/mL (1.54 Log10  IU/mL)  CMV DNA detected below the LLOQ will be reported as Detected < 34.5 IU/mL  (<1.54 Log10 IU/mL)  Marcell Cytomegalovirus (CMV) is an FDA-cleared quantitative test that  enables the detection and quantitation of CMV DNA in EDTA plasma of  infected transplant patients on the marcell 8800 system. This test was  verified by [employer]. Results should be interpreted  with consideration of all clinical findings and laboratory findings and  should not form the sole basis for a diagnosis or treatment decision. Ghv59CE/mL ( @ 06:33)    Clostridium difficile GDH Toxins A&amp;B, EIA:   Negative (22 @ 14:20)  Clostridium difficile GDH Interpretation: Negative for toxigenic C. Difficile.  This specimen is negative for C.  Difficile glutamate dehydrogenase (GDH) antigen and negative for C.  Difficile Toxins A & B, by EIA.  GDH is a highly sensitive screening  marker for C. Difficile that is produced in large amounts by all C.  Difficile strains, both toxigenic and nontoxigenic.  This assay has not  been validated as a test of cure.  Repeat testing during the same episode  of diarrhea is of limited value and is discouraged.  The results of this  assay should always be interpreted in conjunction with pateint's clinical  history. (22 @ 14:20)      RADIOLOGY:    <The imaging below has been reviewed and visualized by me independently. Findings as detailed in report below>    < from: Xray Chest 1 View- PORTABLE-Urgent (Xray Chest 1 View- PORTABLE-Urgent .) (22 @ 11:54) >  IMPRESSION:  Low lung volumes. Clear lungs.    < end of copied text >

## 2022-09-02 NOTE — BRIEF OPERATIVE NOTE - SPECIMENS
microbiology: right foot dirty bone, right boot clean bone; pathology: right foot clean bone, right foot dirty bone

## 2022-09-02 NOTE — DISCHARGE NOTE PROVIDER - NSDCMRMEDTOKEN_GEN_ALL_CORE_FT
Admelog 100 units/mL injectable solution: 10 unit(s) subcutaneous 3 times a day (before meals) with additional sliding scale coverage  everolimus 1 mg oral tablet: 3 milligram(s) orally 2 times a day    at 8AM and 8PM  folic acid 1 mg oral tablet: 1 tab(s) orally once a day  insulin glargine 100 units/mL subcutaneous solution: 14 unit(s) subcutaneous once a day (at bedtime)  mycophenolate mofetil 250 mg oral capsule: 1 cap(s) orally 2 times a day  omega-3 polyunsaturated fatty acids ethyl esters 1000 mg oral capsule: 1 cap(s) orally 2 times a day  pantoprazole 40 mg oral delayed release tablet: 1 tab(s) orally once a day (before a meal)  sertraline 100 mg oral tablet: 1 tab(s) orally once a day   Admelog 100 units/mL injectable solution: 12 unit(s) subcutaneous 3 times a day (before meals) with sliding scale prn  amoxicillin-clavulanate 875 mg-125 mg oral tablet: 1 tab(s) orally 2 times a day   folic acid 1 mg oral tablet: 1 tab(s) orally once a day  Lantus 100 units/mL subcutaneous solution: 16 unit(s) subcutaneous once a day (at bedtime)  mycophenolate mofetil 250 mg oral capsule: 2 cap(s) orally 2 times a day  omega-3 polyunsaturated fatty acids ethyl esters 1000 mg oral capsule: 1 cap(s) orally 2 times a day  pantoprazole 40 mg oral delayed release tablet: 1 tab(s) orally once a day (before a meal)  predniSONE 5 mg oral tablet: 2 tab(s) orally once a day  sertraline 100 mg oral tablet: 1 tab(s) orally once a day  valGANciclovir 450 mg oral tablet: 1 tab(s) orally once a day

## 2022-09-02 NOTE — PROGRESS NOTE ADULT - ATTENDING COMMENTS
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 S/p cefepime/vancomycin (8/30-9/1), changed to Unasyn (9/1- ) per Transplant ID. S/p right foot partial first ray amputation today by Podiatry with cultures sent. Per Podiatry team, they have low concern for residual infection and viability and he can be discharged home from their standpoint if 48 hours of no growth from clean bone culture and pending further Transplant ID recommendations. Pre-op AM labs today notable for MISA on CKD with Cr 2.2. Urinalysis with pyuria but no bacteriuria. May need to re-involve Transplant Nephrology if renal function worsening. Continuing prednisone 10 mg po daily and  mg po bid for immunosuppression. Hepatic allograft function stable on labs. Everolimus was held this admission due to increased risk of impaired wound healing and if graft function stable, will only resume once amputation wound has fully healed. He cannot be switched to CNI given prior severe neurotoxicity even with minimal dosing. Continuing Valcyte 450 mg po daily (renally dosed) for low level CMV viremia.    Please don't hesitate to call with any questions or concerns.    Letitia Mo M.D., Ph.D.  Transplant Hepatology

## 2022-09-02 NOTE — PROGRESS NOTE ADULT - ASSESSMENT
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  Patient has history of LT in July 2020 due to JEAN cirrhosis.  Post-LT course c/b VRE bacteremia, CNI toxicity to both tacrolimus and cyclosporine, and is currently maintained on everolimus and CellCept.  Patient endorses recent trauma ~1 week PTA where toenail was "sheared off" from getting tangled in the bedsheet.  Subsequently, he noticed worsening swelling and erythema of the right great toe in the setting of known diabetic neuropathy. He denies any overt drainage or purulence.  Additionally, he endorses intermittent watery bowel movements over the past 1 week.    # Right toe osteomyelitis  # Diarrhea  # JEAN cirrhosis s/p LT 7/2/2020: maintained on everolimus and CellCept, had elevated liver chemistreies in December 2021 s/p liver biopsy which was negative for evidence of rejection but revealed NRH  # Low level CMV viremia 6/28/2022  # CKD  - C diff negative    Recommendations:  -Check UA   -Plans for OR today  -trend clinical symptoms, exam findings, vital signs, CBC, CMP, INR  -Immunosuppression:       -Active/Maintenance:  home everolimus 3mg BID and CellCept 250mg BID; hold CellCept and Everolimus for now; patient at risk for impaired wound healing with everolimus however limited treatment options given significant CNI neurotoxicity.         -Levels: Everolimus, Whole Blood Result: 8.9 ng/mL (06-29-22 @ 06:20);   -Antibiotics: cefepime IVPB 2000 milliGRAM(s) IV  -Valcyte 450 mg po daily, recheck levels in 1 week  -appreciate recommendations from Transplant ID  -for watery diarrhea, f/u GI PCR, and CMV (detected < 34.5)  -management of osteomyelitis and remainder per primary team  -f/u Everolimus levels  He is medically cleared to proceed with this low risk surgery.    Preliminary note until signed by Attending.    Thank you for involving us in this patient's care.    Charlotte Alcocer MD  Gastroenterology/Hepatology Fellow, PGY-VI    NON-URGENT CONSULTS:  Please email [email or web address]  AT NIGHT AND ON WEEKENDS:  Contact on-call GI fellow via answering service ([phone]) from 5pm-8am and on weekends/holidays  MONDAY-FRIDAY 8AM-5PM:  Pager# [phone] ([facility])  GI Phone# [phone] ([facility])

## 2022-09-02 NOTE — PROGRESS NOTE ADULT - SUBJECTIVE AND OBJECTIVE BOX
Patient tolerated surgery and anesthesia well  Resection of osteomyleitis right Hallux  cultures of clean and dirty bone sent for culture and pathology.  await culture results  would appreciate ID recommendation regarding antibiosis prior to discharge  Podiatry to follow  patient to follow at my office after discharge   [address] [facility] [phone]

## 2022-09-02 NOTE — DISCHARGE NOTE PROVIDER - NSDCCPCAREPLAN_GEN_ALL_CORE_FT
PRINCIPAL DISCHARGE DIAGNOSIS  Diagnosis: Osteomyelitis of great toe of right foot  Assessment and Plan of Treatment: you underwent a partial amputation of your right 1st toe.  please follow instructions per your Podiatry team   Return or call with issues

## 2022-09-03 NOTE — PHYSICAL THERAPY INITIAL EVALUATION ADULT - PERTINENT HX OF CURRENT PROBLEM, REHAB EVAL
Pt is a 65 y/o male with PMH of JEAN cirrhosis s/p liver transplant 7/2/20 (on everolimus/cellcept), HLD, T2DM on insulin with neuropathy, obesity, HFpEF, recent admission in 6/2022 for kidney stone, passed without intervention; now p/w R toe infection. Per pt, about 1 week ago, his feet got tangled in his bedsheet and part of the nails on b/l 1st toes  were sheared off; the next day, there was a small blood spot on the R toe. Over the next couple of days, pt did not notice any significant changes; on Thursday evening his son noted the R toe to look swollen and red. Pt denies any pain, but has severe b/l neuropathy in his feet and has very diminished sensation at baseline. Contacted his podiatrist over the weekend and had appt for Monday 8/29 - his podiatrist was c/f worsening cellulitis vs OM and pt sent to ED for further evaluation. 9/2 s/p R foot partial hallux amputation: sutures intact, no hematoma, mild hyperemia consistent with procedure, no drainage or purulence, no malodor. L foot without clinical signs of infection.  R foot XR: OM of distal hallux distal phalanx, no tracking soft tissue air. R foot MR: OM of hallux distal phalanx. Pt s/p right foot partial hallux amputation 9/2; RLE WBAT to R heel in surgical shoe.

## 2022-09-03 NOTE — PROGRESS NOTE ADULT - SUBJECTIVE AND OBJECTIVE BOX
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     Admitted with right 1st toe osteomyelitis.S/p right foot partial first ray amputation 9/2 by Podiatry. ID following.   Everolimus held in setting of infection.      Interval Events:   - afebrile, stable vs. Remains on unasyn   - POD 1 s/p R toe partial hallux amputation. Pain well controlled  - SCr better      MEDICATIONS  (STANDING):  ampicillin/sulbactam  IVPB 3 Gram(s) IV Intermittent every 6 hours  artificial tears (preservative free) Ophthalmic Solution 1 Drop(s) Both EYES two times a day  carbamide peroxide Otic Solution 1 Drop(s) Both Ears two times a day  chlorhexidine 2% Cloths 1 Application(s) Topical daily  dextrose 5%. 1000 milliLiter(s) (50 mL/Hr) IV Continuous <Continuous>  dextrose 5%. 1000 milliLiter(s) (100 mL/Hr) IV Continuous <Continuous>  dextrose 50% Injectable 25 Gram(s) IV Push once  dextrose 50% Injectable 12.5 Gram(s) IV Push once  dextrose 50% Injectable 25 Gram(s) IV Push once  glucagon  Injectable 1 milliGRAM(s) IntraMuscular once  heparin   Injectable 5000 Unit(s) SubCutaneous every 12 hours  insulin glargine Injectable (LANTUS) 14 Unit(s) SubCutaneous at bedtime  insulin lispro (ADMELOG) corrective regimen sliding scale   SubCutaneous three times a day before meals  insulin lispro (ADMELOG) corrective regimen sliding scale   SubCutaneous at bedtime  insulin lispro Injectable (ADMELOG) 10 Unit(s) SubCutaneous three times a day before meals  mycophenolate mofetil 250 milliGRAM(s) Oral <User Schedule>  pantoprazole    Tablet 40 milliGRAM(s) Oral before breakfast  predniSONE   Tablet 10 milliGRAM(s) Oral daily  sertraline 100 milliGRAM(s) Oral daily  valGANciclovir 450 milliGRAM(s) Oral daily    MEDICATIONS  (PRN):  acetaminophen     Tablet .. 650 milliGRAM(s) Oral every 6 hours PRN Temp greater or equal to 38C (100.4F), Mild Pain (1 - 3)  aluminum hydroxide/magnesium hydroxide/simethicone Suspension 30 milliLiter(s) Oral every 4 hours PRN Dyspepsia  dextrose Oral Gel 15 Gram(s) Oral once PRN Blood Glucose LESS THAN 70 milliGRAM(s)/deciliter  melatonin 3 milliGRAM(s) Oral at bedtime PRN Insomnia  ondansetron Injectable 4 milliGRAM(s) IV Push every 8 hours PRN Nausea and/or Vomiting    Vital Signs Last 24 Hrs  T(C): 36.5 (03 Sep 2022 13:00), Max: 37.1 (02 Sep 2022 18:05)  T(F): 97.7 (03 Sep 2022 13:00), Max: 98.8 (02 Sep 2022 18:05)  HR: 66 (03 Sep 2022 13:00) (63 - 84)  BP: 144/80 (03 Sep 2022 13:00) (133/82 - 169/82)  BP(mean): 106 (02 Sep 2022 17:30) (100 - 119)  RR: 18 (03 Sep 2022 13:00) (16 - 18)  SpO2: 97% (03 Sep 2022 13:00) (95% - 100%)    I&O's Summary    02 Sep 2022 07:01  -  03 Sep 2022 07:00  --------------------------------------------------------  IN: 900 mL / OUT: 2200 mL / NET: -1300 mL    03 Sep 2022 07:01  -  03 Sep 2022 14:14  --------------------------------------------------------  IN: 240 mL / OUT: 700 mL / NET: -460 mL                          7.9    3.59  )-----------( 275      ( 03 Sep 2022 06:53 )             25.3     09-03    138  |  105  |  27<H>  ----------------------------<  274<H>  3.8   |  23  |  1.78<H>    Ca    8.6      03 Sep 2022 06:51  Phos  2.1     09-03  Mg     1.9     09-03    TPro  6.2  /  Alb  3.1<L>  /  TBili  0.1<L>  /  DBili  x   /  AST  13  /  ALT  17  /  AlkPhos  125<H>  09-03      Culture - Tissue with Gram Stain (collected 09-02-22 @ 21:21)  Source: .Tissue Other  Gram Stain (09-03-22 @ 03:35):    No polymorphonuclear leukocytes seen per low power field    Rare Gram Variable Rods seen per oil power field    Culture - Tissue with Gram Stain (collected 09-02-22 @ 21:21)  Source: .Tissue Other  Gram Stain (09-03-22 @ 03:34):    No polymorphonuclear leukocytes seen per low power field    No organisms seen per oil power field    Culture - Abscess with Gram Stain (collected 08-30-22 @ 11:26)  Source: .Abscess right foot  Final Report (09-01-22 @ 16:06):    Culture yields >4 types of aerobic and/or anaerobic bacteria    Call client services within 7 days if further workup is clinically    indicated.    Culture includes    Few Staphylococcus aureus    Few Coag Negative Staphylococcus "Susceptibilities not performed"    Few Corynebacterium species "Susceptibilities not performed"    Few Streptococcus agalactiae (Group B) isolated    Group B streptococci are susceptible to ampicillin,    penicillin and cefazolin, but may be resistant to    erythromycin and clindamycin.    Recommendations for intrapartum prophylaxis for Group B    streptococci are penicillin or ampicillin.  Organism: Staphylococcus aureus (09-01-22 @ 16:06)  Organism: Staphylococcus aureus (09-01-22 @ 16:06)    Culture - Blood (collected 08-30-22 @ 09:40)  Source: .Blood Blood-Peripheral  Preliminary Report (08-31-22 @ 15:05):    No growth to date.    Culture - Blood (collected 08-30-22 @ 09:30)  Source: .Blood Blood-Peripheral  Preliminary Report (08-31-22 @ 15:05):    No growth to date.        ROS:   Complete and normal except as mentioned above.    PHYSICAL EXAM:   GENERAL: no acute distress  NEURO: alert  HEENT: NCAT, no conjunctival pallor appreciated  CHEST: no respiratory distress, no accessory muscle use  CARDIAC: regular rate, +S1/S2  ABDOMEN: soft, nontender, no rebound or guarding  EXTREMITIES: warm, well perfused; right foot digit dressing c/d/i  SKIN: no lesions noted

## 2022-09-03 NOTE — PHYSICAL THERAPY INITIAL EVALUATION ADULT - ADL SKILLS, REHAB EVAL
External INR results today are 2.3. Pt is taking Warfarin 2.5 mg every evening. Per Alexa Galvez an extra dose of Warfarin tonight, resume regular schedule and recheck in 1 week. \" Pt notified.  Results faxed over from [address] [place] [address]. ([phone]) needs device

## 2022-09-03 NOTE — PROGRESS NOTE ADULT - ASSESSMENT
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     [] Right toe osteomyelitis  - s/p R hallux amputation 9/2   - f/u OR cxs  - Podiatry following   - ID following   - Pain control     [] s/p OLT for decompensated JEAN Cirrhosis   - good graft function   - Immuno: Everolimus held this admission in setting of infection, Pred 10mg daily, MMF 250mg bid   - Low level CMV viremia (last pcr 8/31), continue valcyte 450mg daily

## 2022-09-03 NOTE — PHYSICAL THERAPY INITIAL EVALUATION ADULT - GAIT DEVIATIONS NOTED, PT EVAL
pt with 1 instance of LOB secondary accidently stepping outside SRIRAM of RW, pt able to self-correct/decreased nile/increased time in double stance/decreased velocity of limb motion/decreased step length/decreased stride length/decreased weight-shifting ability

## 2022-09-03 NOTE — PROGRESS NOTE ADULT - SUBJECTIVE AND OBJECTIVE BOX
Patient is a 66y old  Male who presents with a chief complaint of R toe infection (02 Sep 2022 16:08)       INTERVAL HPI/OVERNIGHT EVENTS:  Patient seen and evaluated at bedside.  Pt is resting comfortable in NAD. Denies N/V/F/C.  Pain rated at 2/10    Allergies    codeine (Anaphylaxis)    Intolerances        Vital Signs Last 24 Hrs  T(C): 36.6 (03 Sep 2022 09:00), Max: 37.1 (02 Sep 2022 13:00)  T(F): 97.8 (03 Sep 2022 09:00), Max: 98.8 (02 Sep 2022 13:00)  HR: 69 (03 Sep 2022 09:00) (63 - 84)  BP: 169/82 (03 Sep 2022 09:00) (133/82 - 169/82)  BP(mean): 106 (02 Sep 2022 17:30) (100 - 119)  RR: 18 (03 Sep 2022 09:00) (16 - 18)  SpO2: 96% (03 Sep 2022 09:00) (95% - 100%)    Parameters below as of 03 Sep 2022 09:00  Patient On (Oxygen Delivery Method): room air        LABS:                        7.9    3.59  )-----------( 275      ( 03 Sep 2022 06:53 )             25.3     09-03    138  |  105  |  27<H>  ----------------------------<  274<H>  3.8   |  23  |  1.78<H>    Ca    8.6      03 Sep 2022 06:51  Phos  2.1     09-  Mg     1.9     09-03    TPro  6.2  /  Alb  3.1<L>  /  TBili  0.1<L>  /  DBili  x   /  AST  13  /  ALT  17  /  AlkPhos  125<H>  09-03    PT/INR - ( 03 Sep 2022 06:56 )   PT: 12.0 sec;   INR: 1.03 ratio         PTT - ( 03 Sep 2022 06:56 )  PTT:29.1 sec  Urinalysis Basic - ( 02 Sep 2022 11:15 )    Color: Light Yellow / Appearance: Slightly Turbid / S.019 / pH: x  Gluc: x / Ketone: Negative  / Bili: Negative / Urobili: Negative   Blood: x / Protein: 30 mg/dL / Nitrite: Negative   Leuk Esterase: Large / RBC: 4 /hpf /  /HPF   Sq Epi: x / Non Sq Epi: 1 /hpf / Bacteria: Negative      CAPILLARY BLOOD GLUCOSE      POCT Blood Glucose.: 211 mg/dL (03 Sep 2022 08:39)  POCT Blood Glucose.: 236 mg/dL (02 Sep 2022 21:42)  POCT Blood Glucose.: 139 mg/dL (02 Sep 2022 16:05)  POCT Blood Glucose.: 155 mg/dL (02 Sep 2022 13:03)      Lower Extremity Physical Exam:    Vascular: DP/PT 2/4, B/L, CFT <3seconds B/L, Temperature gradient warm to cool, B/L.   Neuro: Epicritic sensation diminished to the level of toes, B/L.  Musculoskeletal/Ortho: unremarkable  Skin:  s/p R foot partial hallux amputation: sutures intact, no hematoma, mild hyperemia consistent with procedure, no drainage or purulence, no malodor. L foot without clinical signs of infection.       RADIOLOGY & ADDITIONAL TESTS:

## 2022-09-03 NOTE — PHYSICAL THERAPY INITIAL EVALUATION ADULT - PLANNED THERAPY INTERVENTIONS, PT EVAL
GOAL: Pt will be able to negotiate 3 steps independently in 2 weeks./balance training/bed mobility training/gait training/strengthening/transfer training

## 2022-09-03 NOTE — PROGRESS NOTE ADULT - ASSESSMENT
63 y/o gentleman with IDDM, dyslipidemia, obesity, GERD, HFpEF with mild LV diastolic dysfunction, MGUS, hronic anemia with a history of duodenal ulcer as well as GAVE and duodenal AVM s/p APC (last on 10/11/19), 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     Re-admitted with worsening HE, UTI/VRE, MISA/Hyponatremia, UGI bleed.     [] VRE UTI  - urine cx from 6/14 grew VRE  - f/u urine cx from 6/21 with NG, bld cxs (6/21) NGTD  - received Linezolid 6/15-22, Now on Bactrim DS  - ID following    [] UGI bleed  - s/p EGD (6/22) c/w hemorrhagic duodenitis/gastritis, Grade 2EV  - transfused 1u PRBC, will keep the goal for Hgb to above 7  - cont Protonix 40mg IV bid  - Change labs from BID to daily in am       [] JEAN Cirrhosis  - Listed for OLT with MELD 35  - cleared by ID   - HE: cont rifaximin/lactulose  - Abd US duplex to assess portal vein: patent vessels   - Epogen 3xweek   - Rec to D/C fluid restriction  - Rec to D/C sodium tabs   - Daily CBC, CMP, INR  - cont SICU care      Please page [phone] with any questions Waiting private transportation

## 2022-09-03 NOTE — PROGRESS NOTE ADULT - ASSESSMENT
65 y/o M w/ R hallux wound to level of bone  - Pt seen and evaluated  - Afebrile, no leukocytosis  - 9/2 s/p R foot partial hallux amputation: sutures intact, no hematoma, mild hyperemia consistent with procedure, no drainage or purulence, no malodor. L foot without clinical signs of infection.   - R foot XR: OM of distal hallux distal phalanx, no tracking soft tissue air  - R foot MR: OM of hallux distal phalanx  - R foot OR data pending   - ID recs appreciated  - Pt will be stable for d/c from podiatry pending 48 hours of no growth from clean bone and final ID recs   - Discussed with attending

## 2022-09-03 NOTE — PHYSICAL THERAPY INITIAL EVALUATION ADULT - ADDITIONAL COMMENTS
Pt lives with children in a private home, 3 steps to negotiate. PTA pt is (I) with ADLs and functional mobility using either cane or RW. Pt stated he has had a few falls or near falls due to L knee buckling.

## 2022-09-04 NOTE — PROGRESS NOTE ADULT - ASSESSMENT
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     [] Right toe osteomyelitis  - s/p R hallux amputation 9/2   - f/u OR cxs  - Podiatry following   - ID following: on Unasyn  - Pain control     [] s/p OLT for decompensated JEAN Cirrhosis   - good graft function   - Immuno: Everolimus held this admission in setting of infection, Pred 10mg daily, MMF 250mg bid   - Low level CMV viremia (last pcr 8/31), continue valcyte 450mg daily

## 2022-09-04 NOTE — PROGRESS NOTE ADULT - SUBJECTIVE AND OBJECTIVE BOX
LVM for patient to return call to the clinic at her convenience.    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     Admitted with right 1st toe osteomyelitis.S/p right foot partial first ray amputation 9/2 by Podiatry. ID following.   Everolimus held in setting of infection.      Interval Events:   - afebrile, stable vs. Remains on unasyn   - POD 2 s/p R toe partial hallux amputation. Pain well controlled            MEDICATIONS  (STANDING):  ampicillin/sulbactam  IVPB 3 Gram(s) IV Intermittent every 6 hours  artificial tears (preservative free) Ophthalmic Solution 1 Drop(s) Both EYES two times a day  carbamide peroxide Otic Solution 1 Drop(s) Both Ears two times a day  chlorhexidine 2% Cloths 1 Application(s) Topical daily  dextrose 5%. 1000 milliLiter(s) (50 mL/Hr) IV Continuous <Continuous>  dextrose 5%. 1000 milliLiter(s) (100 mL/Hr) IV Continuous <Continuous>  dextrose 50% Injectable 25 Gram(s) IV Push once  dextrose 50% Injectable 12.5 Gram(s) IV Push once  dextrose 50% Injectable 25 Gram(s) IV Push once  glucagon  Injectable 1 milliGRAM(s) IntraMuscular once  heparin   Injectable 5000 Unit(s) SubCutaneous every 12 hours  insulin glargine Injectable (LANTUS) 14 Unit(s) SubCutaneous at bedtime  insulin lispro (ADMELOG) corrective regimen sliding scale   SubCutaneous three times a day before meals  insulin lispro (ADMELOG) corrective regimen sliding scale   SubCutaneous at bedtime  insulin lispro Injectable (ADMELOG) 10 Unit(s) SubCutaneous three times a day before meals  mycophenolate mofetil 250 milliGRAM(s) Oral <User Schedule>  pantoprazole    Tablet 40 milliGRAM(s) Oral before breakfast  predniSONE   Tablet 10 milliGRAM(s) Oral daily  sertraline 100 milliGRAM(s) Oral daily  valGANciclovir 450 milliGRAM(s) Oral daily    MEDICATIONS  (PRN):  acetaminophen     Tablet .. 650 milliGRAM(s) Oral every 6 hours PRN Temp greater or equal to 38C (100.4F), Mild Pain (1 - 3)  aluminum hydroxide/magnesium hydroxide/simethicone Suspension 30 milliLiter(s) Oral every 4 hours PRN Dyspepsia  dextrose Oral Gel 15 Gram(s) Oral once PRN Blood Glucose LESS THAN 70 milliGRAM(s)/deciliter  melatonin 3 milliGRAM(s) Oral at bedtime PRN Insomnia  ondansetron Injectable 4 milliGRAM(s) IV Push every 8 hours PRN Nausea and/or Vomiting      PAST MEDICAL & SURGICAL HISTORY:  Diabetes      Hypercholesteremia      Neuropathy      Hypertension      Renal stones  25 years ago      Fatty liver disease, nonalcoholic      Obesity      Hepatic encephalopathy      GERD with esophagitis  Gastritis &amp; Non Bleeding Ulcers      GIB (gastrointestinal bleeding)      S/P cholecystectomy          Vital Signs Last 24 Hrs  T(C): 36.6 (04 Sep 2022 17:00), Max: 36.8 (03 Sep 2022 21:00)  T(F): 97.9 (04 Sep 2022 17:00), Max: 98.3 (04 Sep 2022 01:00)  HR: 74 (04 Sep 2022 17:00) (64 - 79)  BP: 137/64 (04 Sep 2022 17:00) (131/61 - 155/75)  BP(mean): --  RR: 18 (04 Sep 2022 17:00) (18 - 18)  SpO2: 94% (04 Sep 2022 17:00) (93% - 98%)    Parameters below as of 04 Sep 2022 17:00  Patient On (Oxygen Delivery Method): room air        I&O's Summary    03 Sep 2022 07:01  -  04 Sep 2022 07:00  --------------------------------------------------------  IN: 680 mL / OUT: 2685 mL / NET: -2005 mL    04 Sep 2022 07:01  -  04 Sep 2022 17:35  --------------------------------------------------------  IN: 480 mL / OUT: 850 mL / NET: -370 mL                              7.4    4.10  )-----------( 255      ( 04 Sep 2022 06:05 )             24.2     09-04    139  |  106  |  24<H>  ----------------------------<  240<H>  3.9   |  22  |  1.65<H>    Ca    8.4      04 Sep 2022 06:07  Phos  2.2     09-04  Mg     1.9     09-04    TPro  6.2  /  Alb  2.9<L>  /  TBili  0.1<L>  /  DBili  x   /  AST  13  /  ALT  17  /  AlkPhos  126<H>  09-04          Culture - Tissue with Gram Stain (collected 09-02-22 @ 21:21)  Source: .Tissue Other  Gram Stain (09-03-22 @ 03:35):    No polymorphonuclear leukocytes seen per low power field    Rare Gram Variable Rods seen per oil power field  Preliminary Report (09-03-22 @ 20:44):    Rare Staphylococcus aureus    Culture - Tissue with Gram Stain (collected 09-02-22 @ 21:21)  Source: .Tissue Other  Gram Stain (09-03-22 @ 03:34):    No polymorphonuclear leukocytes seen per low power field    No organisms seen per oil power field  Preliminary Report (09-03-22 @ 18:49):    No growth    Culture - Abscess with Gram Stain (collected 08-30-22 @ 11:26)  Source: .Abscess right foot  Final Report (09-01-22 @ 16:06):    Culture yields >4 types of aerobic and/or anaerobic bacteria    Call client services within 7 days if further workup is clinically    indicated.    Culture includes    Few Staphylococcus aureus    Few Coag Negative Staphylococcus "Susceptibilities not performed"    Few Corynebacterium species "Susceptibilities not performed"    Few Streptococcus agalactiae (Group B) isolated    Group B streptococci are susceptible to ampicillin,    penicillin and cefazolin, but may be resistant to    erythromycin and clindamycin.    Recommendations for intrapartum prophylaxis for Group B    streptococci are penicillin or ampicillin.  Organism: Staphylococcus aureus (09-01-22 @ 16:06)  Organism: Staphylococcus aureus (09-01-22 @ 16:06)    Culture - Blood (collected 08-30-22 @ 09:40)  Source: .Blood Blood-Peripheral  Final Report (09-04-22 @ 15:01):    No Growth Final    Culture - Blood (collected 08-30-22 @ 09:30)  Source: .Blood Blood-Peripheral  Final Report (09-04-22 @ 15:01):    No Growth Final                ROS:   Complete and normal except as mentioned above.    PHYSICAL EXAM:   GENERAL: no acute distress  NEURO: alert  HEENT: NCAT, no conjunctival pallor appreciated  CHEST: no respiratory distress, no accessory muscle use  CARDIAC: regular rate, +S1/S2  ABDOMEN: soft, nontender, no rebound or guarding  EXTREMITIES: warm, well perfused; right foot digit dressing c/d/i  SKIN: no lesions noted

## 2022-09-04 NOTE — PROGRESS NOTE ADULT - SUBJECTIVE AND OBJECTIVE BOX
Patient is a 66y old  Male who presents with a chief complaint of R toe infection (03 Sep 2022 14:09)       INTERVAL HPI/OVERNIGHT EVENTS:  Patient seen and evaluated at bedside.  Pt is resting comfortable in NAD. Denies N/V/F/C.  Pain rated at X/10    Allergies    codeine (Anaphylaxis)    Intolerances        Vital Signs Last 24 Hrs  T(C): 36.5 (04 Sep 2022 12:00), Max: 36.8 (03 Sep 2022 21:00)  T(F): 97.7 (04 Sep 2022 12:00), Max: 98.3 (04 Sep 2022 01:00)  HR: 70 (04 Sep 2022 12:00) (64 - 80)  BP: 150/75 (04 Sep 2022 12:00) (131/61 - 155/75)  BP(mean): --  RR: 18 (04 Sep 2022 12:00) (18 - 18)  SpO2: 98% (04 Sep 2022 12:00) (93% - 98%)    Parameters below as of 04 Sep 2022 12:00  Patient On (Oxygen Delivery Method): room air        LABS:                        7.4    4.10  )-----------( 255      ( 04 Sep 2022 06:05 )             24.2     09-04    139  |  106  |  24<H>  ----------------------------<  240<H>  3.9   |  22  |  1.65<H>    Ca    8.4      04 Sep 2022 06:07  Phos  2.2     09-04  Mg     1.9     09-04    TPro  6.2  /  Alb  2.9<L>  /  TBili  0.1<L>  /  DBili  x   /  AST  13  /  ALT  17  /  AlkPhos  126<H>  09-04    PT/INR - ( 04 Sep 2022 06:06 )   PT: 12.2 sec;   INR: 1.05 ratio         PTT - ( 04 Sep 2022 06:06 )  PTT:28.2 sec    CAPILLARY BLOOD GLUCOSE      POCT Blood Glucose.: 261 mg/dL (04 Sep 2022 12:25)  POCT Blood Glucose.: 209 mg/dL (04 Sep 2022 08:33)  POCT Blood Glucose.: 197 mg/dL (03 Sep 2022 21:36)  POCT Blood Glucose.: 214 mg/dL (03 Sep 2022 17:51)      Lower Extremity Physical Exam:    Vascular: DP/PT 2/4, B/L, CFT <3seconds B/L, Temperature gradient warm to cool, B/L.   Neuro: Epicritic sensation diminished to the level of toes, B/L.  Musculoskeletal/Ortho: unremarkable  Skin: 9/2 s/p R foot partial hallux amputation: sutures intact, no hematoma, improved hyperemia consistent with procedure, no drainage or purulence, no malodor. L foot without clinical signs of infection.       RADIOLOGY & ADDITIONAL TESTS:

## 2022-09-04 NOTE — PROGRESS NOTE ADULT - SUBJECTIVE AND OBJECTIVE BOX
Follow Up:      Interval History:    REVIEW OF SYSTEMS  [  ] ROS unobtainable because:    [  ] All other systems negative except as noted below    Constitutional:  [ ] fever [ ] chills  [ ] weight loss  [ ] weakness  Skin:  [ ] rash [ ] phlebitis	  Eyes: [ ] icterus [ ] pain  [ ] discharge	  ENMT: [ ] sore throat  [ ] thrush [ ] ulcers [ ] exudates  Respiratory: [ ] dyspnea [ ] hemoptysis [ ] cough [ ] sputum	  Cardiovascular:  [ ] chest pain [ ] palpitations [ ] edema	  Gastrointestinal:  [ ] nausea [ ] vomiting [ ] diarrhea [ ] constipation [ ] pain	  Genitourinary:  [ ] dysuria [ ] frequency [ ] hematuria [ ] discharge [ ] flank pain  [ ] incontinence  Musculoskeletal:  [ ] myalgias [ ] arthralgias [ ] arthritis  [ ] back pain  Neurological:  [ ] headache [ ] seizures  [ ] confusion/altered mental status    Allergies  codeine (Anaphylaxis)        ANTIMICROBIALS:  ampicillin/sulbactam  IVPB 3 every 6 hours  valGANciclovir 450 daily      OTHER MEDS:  MEDICATIONS  (STANDING):  acetaminophen     Tablet .. 650 every 6 hours PRN  aluminum hydroxide/magnesium hydroxide/simethicone Suspension 30 every 4 hours PRN  dextrose 50% Injectable 25 once  dextrose 50% Injectable 12.5 once  dextrose 50% Injectable 25 once  dextrose Oral Gel 15 once PRN  glucagon  Injectable 1 once  heparin   Injectable 5000 every 12 hours  insulin glargine Injectable (LANTUS) 14 at bedtime  insulin lispro (ADMELOG) corrective regimen sliding scale  three times a day before meals  insulin lispro (ADMELOG) corrective regimen sliding scale  at bedtime  insulin lispro Injectable (ADMELOG) 10 three times a day before meals  melatonin 3 at bedtime PRN  mycophenolate mofetil 250 <User Schedule>  ondansetron Injectable 4 every 8 hours PRN  pantoprazole    Tablet 40 before breakfast  predniSONE   Tablet 10 daily  sertraline 100 daily      Vital Signs Last 24 Hrs  T(C): 36.6 (04 Sep 2022 17:00), Max: 36.8 (03 Sep 2022 21:00)  T(F): 97.9 (04 Sep 2022 17:00), Max: 98.3 (04 Sep 2022 01:00)  HR: 74 (04 Sep 2022 17:00) (64 - 79)  BP: 137/64 (04 Sep 2022 17:00) (131/61 - 155/75)  BP(mean): --  RR: 18 (04 Sep 2022 17:00) (18 - 18)  SpO2: 94% (04 Sep 2022 17:00) (93% - 98%)    Parameters below as of 04 Sep 2022 17:00  Patient On (Oxygen Delivery Method): room air        PHYSICAL EXAMINATION:  General: Alert and Awake, NAD  HEENT: PERRL, EOMI  Neck: Supple  Cardiac: RRR, No M/R/G  Resp: CTAB, No Wh/Rh/Ra  Abdomen: NBS, NT/ND, No HSM, No rigidity or guarding  MSK: No LE edema. No Calf tenderness  : No bob  Skin: No rashes or lesions. Skin is warm and dry to the touch.   Neuro: Alert and Awake. CN 2-12 Grossly intact. Moves all four extremities spontaneously.  Psych: Calm, Pleasant, Cooperative                          7.4    4.10  )-----------( 255      ( 04 Sep 2022 06:05 )             24.2       09-04    139  |  106  |  24<H>  ----------------------------<  240<H>  3.9   |  22  |  1.65<H>    Ca    8.4      04 Sep 2022 06:07  Phos  2.2     09-04  Mg     1.9     09-04    TPro  6.2  /  Alb  2.9<L>  /  TBili  0.1<L>  /  DBili  x   /  AST  13  /  ALT  17  /  AlkPhos  126<H>  09-04          MICROBIOLOGY:  v  .Tissue Other  09-02-22   No growth  --    No polymorphonuclear leukocytes seen per low power field  No organisms seen per oil power field      .Abscess right foot  08-30-22   Culture yields >4 types of aerobic and/or anaerobic bacteria  Call client services within 7 days if further workup is clinically  indicated.  Culture includes  Few Staphylococcus aureus  Few Coag Negative Staphylococcus "Susceptibilities not performed"  Few Corynebacterium species "Susceptibilities not performed"  Few Streptococcus agalactiae (Group B) isolated  Group B streptococci are susceptible to ampicillin,  penicillin and cefazolin, but may be resistant to  erythromycin and clindamycin.  Recommendations for intrapartum prophylaxis for Group B  streptococci are penicillin or ampicillin.  --  Staphylococcus aureus      .Blood Blood-Peripheral  08-30-22   No Growth Final  --  --      .Blood Blood-Peripheral  08-30-22   No Growth Final  --  --          CMVPCR Log: Det <1.54 Assay Dynamic Range: 34.5 to 1.0E+07 IU/mL (1.54 to 7.00 Log10 IU/mL)  Assay lower limit of quantification (LLOQ) is 34.5 IU/mL (1.54 Log10  IU/mL)  CMV DNA detected below the LLOQ will be reported as Detected < 34.5 IU/mL  (<1.54 Log10 IU/mL)  Marcell Cytomegalovirus (CMV) is an FDA-cleared quantitative test that  enables the detection and quantitation of CMV DNA in EDTA plasma of  infected transplant patients on the marcell Fwd: Power00 system. This test was  verified by [employer]. Results should be interpreted  with consideration of all clinical findings and laboratory findings and  should not form the sole basis for a diagnosis or treatment decision. Grr82HE/mL (08-31 @ 06:33)    Clostridium difficile GDH Toxins A&amp;B, EIA:   Negative (08-30-22 @ 14:20)  Clostridium difficile GDH Interpretation: Negative for toxigenic C. Difficile.  This specimen is negative for C.  Difficile glutamate dehydrogenase (GDH) antigen and negative for C.  Difficile Toxins A & B, by EIA.  GDH is a highly sensitive screening  marker for C. Difficile that is produced in large amounts by all C.  Difficile strains, both toxigenic and nontoxigenic.  This assay has not  been validated as a test of cure.  Repeat testing during the same episode  of diarrhea is of limited value and is discouraged.  The results of this  assay should always be interpreted in conjunction with pateint's clinical  history. (08-30-22 @ 14:20)      RADIOLOGY:    <The imaging below has been reviewed and visualized by me independently. Findings as detailed in report below> Follow Up:  Toe Osteomyelitis    Interval History: afebrile. no acute overnight events.     REVIEW OF SYSTEMS  [  ] ROS unobtainable because:    [ x ] All other systems negative except as noted below    Constitutional:  [ ] fever [ ] chills  [ ] weight loss  [ ] weakness  Skin:  [ ] rash [ ] phlebitis	  Eyes: [ ] icterus [ ] pain  [ ] discharge	  ENMT: [ ] sore throat  [ ] thrush [ ] ulcers [ ] exudates  Respiratory: [ ] dyspnea [ ] hemoptysis [ ] cough [ ] sputum	  Cardiovascular:  [ ] chest pain [ ] palpitations [ ] edema	  Gastrointestinal:  [ ] nausea [ ] vomiting [ ] diarrhea [ ] constipation [ ] pain	  Genitourinary:  [ ] dysuria [ ] frequency [ ] hematuria [ ] discharge [ ] flank pain  [ ] incontinence  Musculoskeletal:  [ ] myalgias [ ] arthralgias [ ] arthritis  [ ] back pain  Neurological:  [ ] headache [ ] seizures  [ ] confusion/altered mental status    Allergies  codeine (Anaphylaxis)        ANTIMICROBIALS:  ampicillin/sulbactam  IVPB 3 every 6 hours  valGANciclovir 450 daily      OTHER MEDS:  MEDICATIONS  (STANDING):  acetaminophen     Tablet .. 650 every 6 hours PRN  aluminum hydroxide/magnesium hydroxide/simethicone Suspension 30 every 4 hours PRN  dextrose 50% Injectable 25 once  dextrose 50% Injectable 12.5 once  dextrose 50% Injectable 25 once  dextrose Oral Gel 15 once PRN  glucagon  Injectable 1 once  heparin   Injectable 5000 every 12 hours  insulin glargine Injectable (LANTUS) 14 at bedtime  insulin lispro (ADMELOG) corrective regimen sliding scale  three times a day before meals  insulin lispro (ADMELOG) corrective regimen sliding scale  at bedtime  insulin lispro Injectable (ADMELOG) 10 three times a day before meals  melatonin 3 at bedtime PRN  mycophenolate mofetil 250 <User Schedule>  ondansetron Injectable 4 every 8 hours PRN  pantoprazole    Tablet 40 before breakfast  predniSONE   Tablet 10 daily  sertraline 100 daily      Vital Signs Last 24 Hrs  T(C): 36.6 (04 Sep 2022 17:00), Max: 36.8 (03 Sep 2022 21:00)  T(F): 97.9 (04 Sep 2022 17:00), Max: 98.3 (04 Sep 2022 01:00)  HR: 74 (04 Sep 2022 17:00) (64 - 79)  BP: 137/64 (04 Sep 2022 17:00) (131/61 - 155/75)  BP(mean): --  RR: 18 (04 Sep 2022 17:00) (18 - 18)  SpO2: 94% (04 Sep 2022 17:00) (93% - 98%)    Parameters below as of 04 Sep 2022 17:00  Patient On (Oxygen Delivery Method): room air      PHYSICAL EXAMINATION:  General: Alert and Awake, NAD  Cardiac: RRR, No M/R/G  Resp: CTAB, No Wh/Rh/Ra  Abdomen: NBS, NT/ND, No HSM, No rigidity or guarding  MSK: Dressing over RLE 1st digit. No LE edema. No stigmata of IE. No evidence of phlebitis. No evidence of synovitis.  : No bob  Skin: Findings per MSK. Skin is warm and dry to the touch.   Neuro: Alert and Awake. CN 2-12 Grossly intact. Moves all four extremities spontaneously.  Psych: Calm, Pleasant, Cooperative                            7.4    4.10  )-----------( 255      ( 04 Sep 2022 06:05 )             24.2       09-04    139  |  106  |  24<H>  ----------------------------<  240<H>  3.9   |  22  |  1.65<H>    Ca    8.4      04 Sep 2022 06:07  Phos  2.2     09-04  Mg     1.9     09-04    TPro  6.2  /  Alb  2.9<L>  /  TBili  0.1<L>  /  DBili  x   /  AST  13  /  ALT  17  /  AlkPhos  126<H>  09-04          MICROBIOLOGY:  v  .Tissue Other  09-02-22   No growth  --    No polymorphonuclear leukocytes seen per low power field  No organisms seen per oil power field      .Abscess right foot  08-30-22   Culture yields >4 types of aerobic and/or anaerobic bacteria  Call client services within 7 days if further workup is clinically  indicated.  Culture includes  Few Staphylococcus aureus  Few Coag Negative Staphylococcus "Susceptibilities not performed"  Few Corynebacterium species "Susceptibilities not performed"  Few Streptococcus agalactiae (Group B) isolated  Group B streptococci are susceptible to ampicillin,  penicillin and cefazolin, but may be resistant to  erythromycin and clindamycin.  Recommendations for intrapartum prophylaxis for Group B  streptococci are penicillin or ampicillin.  --  Staphylococcus aureus      .Blood Blood-Peripheral  08-30-22   No Growth Final  --  --      .Blood Blood-Peripheral  08-30-22   No Growth Final  --  --          CMVPCR Log: Det <1.54 Assay Dynamic Range: 34.5 to 1.0E+07 IU/mL (1.54 to 7.00 Log10 IU/mL)  Assay lower limit of quantification (LLOQ) is 34.5 IU/mL (1.54 Log10  IU/mL)  CMV DNA detected below the LLOQ will be reported as Detected < 34.5 IU/mL  (<1.54 Log10 IU/mL)  Marcell Cytomegalovirus (CMV) is an FDA-cleared quantitative test that  enables the detection and quantitation of CMV DNA in EDTA plasma of  infected transplant patients on the marcell 8800 system. This test was  verified by [employer]. Results should be interpreted  with consideration of all clinical findings and laboratory findings and  should not form the sole basis for a diagnosis or treatment decision. Vnb93SR/mL (08-31 @ 06:33)    Clostridium difficile GDH Toxins A&amp;B, EIA:   Negative (08-30-22 @ 14:20)  Clostridium difficile GDH Interpretation: Negative for toxigenic C. Difficile.  This specimen is negative for C.  Difficile glutamate dehydrogenase (GDH) antigen and negative for C.  Difficile Toxins A & B, by EIA.  GDH is a highly sensitive screening  marker for C. Difficile that is produced in large amounts by all C.  Difficile strains, both toxigenic and nontoxigenic.  This assay has not  been validated as a test of cure.  Repeat testing during the same episode  of diarrhea is of limited value and is discouraged.  The results of this  assay should always be interpreted in conjunction with pateint's clinical  history. (08-30-22 @ 14:20)      RADIOLOGY:    <The imaging below has been reviewed and visualized by me independently. Findings as detailed in report below>    < from: Xray Foot AP + Lateral + Oblique, Right (09.02.22 @ 16:13) >  IMPRESSION:  Status post interval amputation of right great toe at the level of the   proximal phalanx diaphysis. Expected post surgical soft tissue changes.    < end of copied text >

## 2022-09-04 NOTE — PROVIDER CONTACT NOTE (OTHER) - ASSESSMENT
Pt A+Ox4, VSS, denies CP; no c/o pain, discomfort, or distress. R foot dressed and covered in ace bandage.

## 2022-09-04 NOTE — PROGRESS NOTE ADULT - ASSESSMENT
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     COVID19 PCR (8/30) Negative.   ESR (8/30) 106.   CRP (8/30) 180.     XR Foot (8/30) Questionable bony erosion of the tuft of the first distal phalanx.    On 8/30 s/p removal of toenail with nail bed wound that extended to bone.     MRI RLE (8/31) osteomyelitis of the distal phalanx tip with likely erosions within the plantar distal tip of the distal phalanx.    On 9/2 s/p Partial amputation of right great toe    #RLE 1st digit OM, Positive Culture Finding (Polymicrobial including MSSA, GBS)  Dirty bone cultures with Staph Aureus but clean bone cultures with no growth  --If no growth on clean bone cultures by tomorrow would discharge on Augmentin through 9/8/22  --Continue to follow CBC with diff  --Continue to follow temperature curve  --Follow up on preliminary blood cultures  --Follow up on preliminary operative cultures    #Liver Transplant Recipient, Recent CMV Viremia  CMV PCR (8/31) < 34.5  --Continue Valcyte 450 mg PO Q24H (prophylaxis dosing)  --Check CMV PCR on 9/8/22    I will continue to follow. Please feel free to contact me with any further questions.    Eusebio Pérez M.D.  [facility] Division of Infectious Disease  8AM-5PM Monday - Friday: Available on [employer] Teams  After Hours and Holidays (or if no response on [employer] Teams): Please contact the Infectious Diseases Office at [phone]    The above assessment and plan were discussed with Sebastian transplant surgery PA

## 2022-09-04 NOTE — PROGRESS NOTE ADULT - ASSESSMENT
67 y/o M w/ R hallux wound to level of bone  - Pt seen and evaluated  - Afebrile, no leukocytosis  - 9/2 s/p R foot partial hallux amputation: sutures intact, no hematoma, improved hyperemia consistent with procedure, no drainage or purulence, no malodor. L foot without clinical signs of infection.   - R foot XR: OM of distal hallux distal phalanx, no tracking soft tissue air  - R foot MR: OM of hallux distal phalanx  - R foot OR clean bone with no growth   - ID recs appreciated  - Pt will be stable for d/c from podiatry pending 24 hours of no growth from clean bone and final ID recs   - Discussed with attending

## 2022-09-05 NOTE — DISCHARGE NOTE NURSING/CASE MANAGEMENT/SOCIAL WORK - PATIENT PORTAL LINK FT
You can access the FollowMyHealth Patient Portal offered by [facility] by registering at the following website: [email or web address]. By joining [employer]’s FollowMyHealth portal, you will also be able to view your health information using other applications (apps) compatible with our system.

## 2022-09-05 NOTE — DISCHARGE NOTE NURSING/CASE MANAGEMENT/SOCIAL WORK - NSDCPEFALRISK_GEN_ALL_CORE
For information on Fall & Injury Prevention, visit: [email or web address] OR  [email or web address] OR  https://www.cdc.gov/steadi/patient.html

## 2022-09-05 NOTE — DISCHARGE NOTE NURSING/CASE MANAGEMENT/SOCIAL WORK - NSDCFUADDAPPT_GEN_ALL_CORE_FT
Follow-up with Dr. Medina in one week with lab check    Podiatry Discharge Instructions:  Follow up: Please follow up with Dr. Noyola within 1 week of discharge from the hospital, please call [phone] for appointment and discuss that you recently were seen in the hospital.  Wound Care: Please leave your dressing clean dry intact until your follow up appointment   Weight bearing: Please weight bear as tolerated in a surgical shoe.  Antibiotics: Please continue as instructed.

## 2022-09-05 NOTE — PROGRESS NOTE ADULT - REASON FOR ADMISSION
R toe infection
present

## 2022-09-05 NOTE — PROGRESS NOTE ADULT - SUBJECTIVE AND OBJECTIVE BOX
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     Admitted with right 1st toe osteomyelitis.S/p right foot partial first ray amputation 9/2 by Podiatry. ID following.   Everolimus held in setting of infection.      Interval Events:   - afebrile, stable vs. Remains on unasyn   - POD 3 s/p R toe partial hallux amputation. Pain well controlled  - ambulating        MEDICATIONS  (STANDING):  ampicillin/sulbactam  IVPB 3 Gram(s) IV Intermittent every 6 hours  artificial tears (preservative free) Ophthalmic Solution 1 Drop(s) Both EYES two times a day  carbamide peroxide Otic Solution 1 Drop(s) Both Ears two times a day  chlorhexidine 2% Cloths 1 Application(s) Topical daily  dextrose 5%. 1000 milliLiter(s) (50 mL/Hr) IV Continuous <Continuous>  dextrose 5%. 1000 milliLiter(s) (100 mL/Hr) IV Continuous <Continuous>  dextrose 50% Injectable 25 Gram(s) IV Push once  dextrose 50% Injectable 12.5 Gram(s) IV Push once  dextrose 50% Injectable 25 Gram(s) IV Push once  glucagon  Injectable 1 milliGRAM(s) IntraMuscular once  heparin   Injectable 5000 Unit(s) SubCutaneous every 12 hours  insulin glargine Injectable (LANTUS) 14 Unit(s) SubCutaneous at bedtime  insulin lispro (ADMELOG) corrective regimen sliding scale   SubCutaneous three times a day before meals  insulin lispro (ADMELOG) corrective regimen sliding scale   SubCutaneous at bedtime  insulin lispro Injectable (ADMELOG) 10 Unit(s) SubCutaneous three times a day before meals  mycophenolate mofetil 250 milliGRAM(s) Oral <User Schedule>  pantoprazole    Tablet 40 milliGRAM(s) Oral before breakfast  predniSONE   Tablet 10 milliGRAM(s) Oral daily  sertraline 100 milliGRAM(s) Oral daily  valGANciclovir 450 milliGRAM(s) Oral daily    MEDICATIONS  (PRN):  acetaminophen     Tablet .. 650 milliGRAM(s) Oral every 6 hours PRN Temp greater or equal to 38C (100.4F), Mild Pain (1 - 3)  aluminum hydroxide/magnesium hydroxide/simethicone Suspension 30 milliLiter(s) Oral every 4 hours PRN Dyspepsia  dextrose Oral Gel 15 Gram(s) Oral once PRN Blood Glucose LESS THAN 70 milliGRAM(s)/deciliter  melatonin 3 milliGRAM(s) Oral at bedtime PRN Insomnia  ondansetron Injectable 4 milliGRAM(s) IV Push every 8 hours PRN Nausea and/or Vomiting      PAST MEDICAL & SURGICAL HISTORY:  Diabetes      Hypercholesteremia      Neuropathy      Hypertension      Renal stones  25 years ago      Fatty liver disease, nonalcoholic      Obesity      Hepatic encephalopathy      GERD with esophagitis  Gastritis &amp; Non Bleeding Ulcers      GIB (gastrointestinal bleeding)      S/P cholecystectomy          Vital Signs Last 24 Hrs  T(C): 36.7 (05 Sep 2022 05:00), Max: 37.2 (04 Sep 2022 21:00)  T(F): 98 (05 Sep 2022 05:00), Max: 98.9 (04 Sep 2022 21:00)  HR: 80 (05 Sep 2022 05:00) (70 - 80)  BP: 145/72 (05 Sep 2022 05:00) (132/65 - 154/83)  BP(mean): --  RR: 18 (05 Sep 2022 05:00) (18 - 18)  SpO2: 99% (05 Sep 2022 05:00) (94% - 99%)    Parameters below as of 05 Sep 2022 05:00  Patient On (Oxygen Delivery Method): room air        I&O's Summary    04 Sep 2022 07:01  -  05 Sep 2022 07:00  --------------------------------------------------------  IN: 480 mL / OUT: 1250 mL / NET: -770 mL                              8.5    4.53  )-----------( 293      ( 05 Sep 2022 06:20 )             27.4     09-05    140  |  106  |  24<H>  ----------------------------<  262<H>  4.1   |  23  |  1.95<H>    Ca    8.8      05 Sep 2022 06:20  Phos  2.5     09-05  Mg     1.8     09-05    TPro  6.2  /  Alb  2.9<L>  /  TBili  0.1<L>  /  DBili  x   /  AST  19  /  ALT  25  /  AlkPhos  134<H>  09-05          Culture - Tissue with Gram Stain (collected 09-02-22 @ 21:21)  Source: .Tissue Other  Gram Stain (09-03-22 @ 03:35):    No polymorphonuclear leukocytes seen per low power field    Rare Gram Variable Rods seen per oil power field  Preliminary Report (09-04-22 @ 21:53):    Rare Staphylococcus aureus    Moderate Bacteroides fragilis "Susceptibilities not performed"    Few Streptococcus agalactiae (Group B) isolated    Group B streptococci are susceptible to ampicillin,    penicillin and cefazolin, but may be resistant to    erythromycin and clindamycin.    Recommendations for intrapartum prophylaxis for Group B    streptococci are penicillin or ampicillin.    Culture - Tissue with Gram Stain (collected 09-02-22 @ 21:21)  Source: .Tissue Other  Gram Stain (09-03-22 @ 03:34):    No polymorphonuclear leukocytes seen per low power field    No organisms seen per oil power field  Preliminary Report (09-03-22 @ 18:49):    No growth    Culture - Abscess with Gram Stain (collected 08-30-22 @ 11:26)  Source: .Abscess right foot  Final Report (09-01-22 @ 16:06):    Culture yields >4 types of aerobic and/or anaerobic bacteria    Call client services within 7 days if further workup is clinically    indicated.    Culture includes    Few Staphylococcus aureus    Few Coag Negative Staphylococcus "Susceptibilities not performed"    Few Corynebacterium species "Susceptibilities not performed"    Few Streptococcus agalactiae (Group B) isolated    Group B streptococci are susceptible to ampicillin,    penicillin and cefazolin, but may be resistant to    erythromycin and clindamycin.    Recommendations for intrapartum prophylaxis for Group B    streptococci are penicillin or ampicillin.  Organism: Staphylococcus aureus (09-01-22 @ 16:06)  Organism: Staphylococcus aureus (09-01-22 @ 16:06)    Culture - Blood (collected 08-30-22 @ 09:40)  Source: .Blood Blood-Peripheral  Final Report (09-04-22 @ 15:01):    No Growth Final    Culture - Blood (collected 08-30-22 @ 09:30)  Source: .Blood Blood-Peripheral  Final Report (09-04-22 @ 15:01):    No Growth Final                        ROS:   Complete and normal except as mentioned above.    PHYSICAL EXAM:   GENERAL: no acute distress  NEURO: alert  HEENT: NCAT, no conjunctival pallor appreciated  CHEST: no respiratory distress, no accessory muscle use  CARDIAC: regular rate, +S1/S2  ABDOMEN: soft, nontender, no rebound or guarding  EXTREMITIES: warm, well perfused; right foot digit dressing c/d/i, dressed daily by Podiatry team  SKIN: no lesions noted

## 2022-09-05 NOTE — DISCHARGE NOTE NURSING/CASE MANAGEMENT/SOCIAL WORK - NSDCVIVACCINE_GEN_ALL_CORE_FT
influenza, injectable, quadrivalent, preservative free; 17-Dec-2019 10:15; Rajinder Rivera (RN); [employer]; [id number] (Exp. Date: 30-Jun-2020); IntraMuscular; Deltoid Right.; 0.5 milliLiter(s); VIS (VIS Published: 15-Aug-2019, VIS Presented: 17-Dec-2019);   Pneumococcal conjugate PCV 13; 16-Dec-2019 18:28; Jodie Serrano (RN); [facility]; [id number] (Exp. Date: 01-Jun-2021); IntraMuscular; Deltoid Right.; 0.5 milliLiter(s); VIS (VIS Published: 05-Nov-2015, VIS Presented: 16-Dec-2019);   pneumococcal polysaccharide PPV23; 18-Feb-2020 13:31; Carroll Guardado (RN); [employer] [id number] (Exp. Date: 18-May-2021); IntraMuscular; Deltoid Right.; 0.5 milliLiter(s); VIS (VIS Published: 06-Oct-2009, VIS Presented: 18-Feb-2020);

## 2022-09-05 NOTE — PROGRESS NOTE ADULT - NS ATTEND AMEND GEN_ALL_CORE FT
I personally reviewed the relevant record, saw, and examined the patient. I agree with the H&P and A&P outlined by the transplant NP as above. The case was discussed with multidisciplinary team.
I personally reviewed the relevant record, saw, and examined the patient. I agree with the H&P and A&P outlined by the transplant PA as above. The case was discussed with multidisciplinary team.
I personally reviewed the relevant record, saw, and examined the patient. I agree with the H&P and A&P outlined by the transplant PA as above. The case was discussed with multidisciplinary team.
alert

## 2022-09-05 NOTE — PROGRESS NOTE ADULT - PROVIDER SPECIALTY LIST ADULT
Podiatry
Podiatry
Transplant Hepatology
Transplant Hepatology
Podiatry
Transplant Hepatology
Transplant Hepatology
Podiatry
Transplant Hepatology
Transplant Hepatology
Transplant ID
Podiatry

## 2022-09-05 NOTE — PROGRESS NOTE ADULT - ASSESSMENT
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     [] Right toe osteomyelitis  - s/p R hallux amputation 9/2   - f/u OR cxs. if clear through today, will switch from Unasyn to Augmentin until  9/8 per Transplant ID  - Podiatry following   - Pain control prn  - PT clearance    [] s/p OLT for decompensated JEAN Cirrhosis   - good graft function   - Immuno: Everolimus held this admission in setting of infection, Pred 10mg daily, MMF 250mg bid   - Low level CMV viremia (last pcr 8/31), continue valcyte 450mg daily  . 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     [] Right toe osteomyelitis  - s/p R hallux amputation 9/2   - f/u OR cxs. if clear through today, will switch from Unasyn to Augmentin until  9/8 per Transplant ID  - Podiatry following   - Pain control prn  - PT clearance    [] s/p OLT for decompensated JEAN Cirrhosis   - good graft function   - Immuno: Everolimus held this admission in setting of infection, Pred 10mg daily, MMF 250mg bid   - Low level CMV viremia (last pcr 8/31), continue valcyte 450mg daily  . 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     [] Right toe osteomyelitis  - s/p R hallux amputation 9/2   - f/u OR cxs. if clear through today, will switch from Unasyn to Augmentin until  9/8 per Transplant ID  - Podiatry following   - Pain control prn  - PT clearance    [] s/p OLT for decompensated JEAN Cirrhosis   - good graft function   - Immuno: Everolimus held this admission in setting of infection, Pred 10mg daily, MMF 250mg bid   - Low level CMV viremia (last pcr 8/31), continue valcyte 450mg daily  . 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     [] Right toe osteomyelitis  - s/p R hallux amputation 9/2   - +polymicrobial infected joint, OR cultures now clear.  Stop Unasyn and start Augmentin  - Podiatry following   - Pain control prn    [] s/p OLT for decompensated JEAN Cirrhosis   - good graft function   - Immuno: Everolimus held this admission in setting of infection, Pred 10mg daily, MMF 250mg bid   - Low level CMV viremia (last pcr 8/31), continue valcyte 450mg daily  . Next check 9/8  - mild MISA, increase PO hydration, 250cc NS bolus    [] DM  - increase regimen today (Lantus/Lispro)    [] Dispo  - plan for d/c today  - f/u with Dr. Medina in one week with lab check (including CMV)  - f/u with Podiatry in one week

## 2022-09-06 NOTE — ED ADULT NURSE NOTE - FALLEN IN THE PAST
Cassy Scott is a 50 y.o. female with history of ventral hernia repair with mesh (2009) who presented with an umbilical hernia inferior to her prior mesh repair. She underwent open umbilical hernia repair, please see operative note for details. She was discharged the same day after meeting discharge criteria.   no

## 2022-09-13 NOTE — PATIENT PROFILE ADULT - FUNCTIONAL SCREEN CURRENT LEVEL: COMMUNICATION, MLM
normal/clear to auscultation bilaterally/no wheezes/no rales/no rhonchi
0 = understands/communicates without difficulty

## 2022-10-08 NOTE — ED ADULT NURSE NOTE - MUSCULOSKELETAL WDL
I put in order for nebulizer with supplies, also sent albuterol ampules to [facility] pharmacy 
Female
Full range of motion of upper and lower extremities, no joint tenderness/swelling.

## 2022-10-14 NOTE — PROGRESS NOTE ADULT - SUBJECTIVE AND OBJECTIVE BOX
Pt called reporting he was actually taking Vitamin D 1000u prior to prescribing 2000u. Pt accidentally thought it was Vitamin B. Review prescription instructions with pt. Pt will start taking vitamin D 50mcg 2000u every day. All questions answered to pt's satisfaction.    Patient is a 62y old  Male who presents with a chief complaint of vomiting and near syncope (03 Dec 2018 15:02)    PAST MEDICAL & SURGICAL HISTORY:  GERD with esophagitis: Gastritis &amp; Non Bleeding Ulcers  Hepatic encephalopathy  Obesity  Fatty liver disease, nonalcoholic  Renal stones: 25 years ago  Hypertension  Neuropathy  Hypercholesteremia  Diabetes  S/P cholecystectomy    JESSICA MARTIN   62y    Male    BRIEF HOSPITAL COURSE:    Review of Systems:                       All other ROS are negative.    Allergies    codeine (Anaphylaxis)    Intolerances          ICU Vital Signs Last 24 Hrs  T(C): 36.9 (03 Dec 2018 17:58), Max: 37 (03 Dec 2018 08:00)  T(F): 98.4 (03 Dec 2018 17:58), Max: 98.6 (03 Dec 2018 08:00)  HR: 115 (03 Dec 2018 20:00) (108 - 146)  BP: 127/58 (03 Dec 2018 20:00) (93/52 - 145/62)  BP(mean): 79 (03 Dec 2018 20:00) (68 - 91)  ABP: --  ABP(mean): --  RR: 27 (03 Dec 2018 20:00) (9 - 33)  SpO2: 99% (03 Dec 2018 20:00) (94% - 100%)      LABS:                        6.6    x     )-----------( x        ( 03 Dec 2018 16:01 )             19.0     12-03    138  |  105  |  48<H>  ----------------------------<  168<H>  4.8   |  24  |  1.10    Ca    7.9<L>      03 Dec 2018 06:24  Phos  3.2     12-03  Mg     1.6     12-03    TPro  4.2<L>  /  Alb  2.1<L>  /  TBili  3.1<H>  /  DBili  1.10<H>  /  AST  38<H>  /  ALT  22  /  AlkPhos  71  12-03      CARDIAC MARKERS ( 02 Dec 2018 08:40 )  x     / x     / 504 U/L / x     / x          CAPILLARY BLOOD GLUCOSE      POCT Blood Glucose.: 253 mg/dL (03 Dec 2018 17:18)  POCT Blood Glucose.: 216 mg/dL (03 Dec 2018 12:31)  POCT Blood Glucose.: 186 mg/dL (03 Dec 2018 05:40)  POCT Blood Glucose.: 198 mg/dL (02 Dec 2018 23:09)    PT/INR - ( 03 Dec 2018 06:24 )   PT: 18.5 sec;   INR: 1.61 ratio         PTT - ( 03 Dec 2018 06:24 )  PTT:30.6 sec    CULTURES:  Culture Results:   No growth to date. (11-30 @ 20:55)  Culture Results:   No growth to date. (11-30 @ 20:55)  Rapid RVP Result: NotDetec (11-30 @ 20:45)  Culture Results:   <10,000 CFU/ml Normal Urogenital ninfa present (11-30 @ 20:19)      Medications:  MEDICATIONS  (STANDING):  atorvastatin 10 milliGRAM(s) Oral at bedtime  dextrose 5%. 1000 milliLiter(s) (50 mL/Hr) IV Continuous <Continuous>  dextrose 50% Injectable 12.5 Gram(s) IV Push once  dextrose 50% Injectable 25 Gram(s) IV Push once  dextrose 50% Injectable 25 Gram(s) IV Push once  gabapentin 200 milliGRAM(s) Oral four times a day  insulin glargine Injectable (LANTUS) 40 Unit(s) SubCutaneous at bedtime  insulin lispro (HumaLOG) corrective regimen sliding scale   SubCutaneous every 6 hours  lactated ringers. 1000 milliLiter(s) (75 mL/Hr) IV Continuous <Continuous>  pantoprazole Infusion 8 mG/Hr (10 mL/Hr) IV Continuous <Continuous>  rifaximin 550 milliGRAM(s) Oral two times a day  sucralfate 1 Gram(s) Oral every 6 hours  ursodiol Capsule 300 milliGRAM(s) Oral three times a day    MEDICATIONS  (PRN):  dextrose 40% Gel 15 Gram(s) Oral once PRN Blood Glucose LESS THAN 70 milliGRAM(s)/deciliter  glucagon  Injectable 1 milliGRAM(s) IntraMuscular once PRN Glucose LESS THAN 70 milligrams/deciliter  lidocaine 2% Gel 1 Application(s) Topical every 3 hours PRN pain        12-02 @ 07:01  -  12-03 @ 07:00  --------------------------------------------------------  IN: 1590 mL / OUT: 1940 mL / NET: -350 mL    12-03 @ 07:01  -  12-03 @ 21:12  --------------------------------------------------------  IN: 2763 mL / OUT: 925 mL / NET: 1838 mL        RADIOLOGY/IMAGING/ECHO    < from: CT Angio Abdomen and Pelvis w/ IV Cont (12.01.18 @ 10:46) >  mpression:    Limited. Suboptimal arterial phase.  No obvious active gastrointestinal hemorrhage. If warranted scintigraphy   might be considered for additional evaluation.  Additional findings as discussed      Assessment/Plan:      62M hx   Morbid obesity DM (2) , PN HTN, HLD, JEAN, cirrhosis hepatic encephalopathy,  GERD PUD,  admit 12/30 UGIB developed in hospital.     Course complicated by:    HE  LA resolved  MISA  now improved   Thrombocytopenia requiring transfusion in setting of ABLA   Coagulopathy  due to cirrhosis lack of factors     Has received 9 Packed cells  2 SDU plts  4 FFP      EGD 12/2  DU injected unable to deploy clips  Repeat EGD today active DU bleeding epi injected again, cautery  2 clips deployed.       EGD today with large bleeding duodenal ulcer s/p clips x2, epi injection, and cautery.    PPI drip carafate  Serial CBC    If further bleed, transfuse factors including cryo for low fibrinogen    IR/Sx      CCT 35

## 2022-10-27 NOTE — ED ADULT NURSE NOTE - BREATH SOUNDS, MLM
LOV: 02/09/2021  Upcoming OV: 02/01/2023  Last medication refill for Anoro Ellipta inhaler: 6/24/2022 with 3 refills    Medication failed refill protocol due to appointment follow-ups not within recommended time frame.   Potential duplicate refill request. Routed to Dr. Allen for review upon return to office.   Clear

## 2022-11-03 NOTE — ED PROVIDER NOTE - ATTENDING CONTRIBUTION TO CARE
Attending MD Muse:  I personally have seen and examined this patient. I have performed a substantive portion of the visit including all aspects of the medical decision making.  Resident note reviewed and agree on plan of care and except where noted.      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    Exam does show necrotic wound to the tip of the right great toe.  There is erythema of the dorsum of the distal one third of the right foot.  The right foot is warm to the touch otherwise.  There is a palpable DP pulse.  Capillary refill is good throughout the right foot otherwise.  There is edema of the bilateral ankles.    Suspect likely infected ulcer of the right great toe.  We will start IV antibiotics, obtain labs and screening x-rays to evaluate for obvious signs of osteomyelitis.  Podiatry consult will be called.  Patient to be admitted given immunosuppression and infected wound of the right great toe.  There is no evidence of acute arterial vascular insufficiency of the right lower extremity.    *The above represents an initial assessment/impression. Please refer to progress notes for potential changes in patient clinical course*

## 2022-11-03 NOTE — CONSULT NOTE ADULT - ASSESSMENT
65 y/o M w/ R hallux wound to level of bone  - Pt seen and evaluated  - Afebrile, no leukocytosis, ESR/CRP pending   - L foot xray" Possible cortical erosion of the tip of the second distal phalanx. Can obtain MR foot to further evaluate for osteomyelitis.  - R foot 2nd digit with distal wound to bone with surrounding necrotic wound bed with malodor, medial bogginess erythema to the level of MTPJ, no crepitus or fluctuance. R foot partial hallux healed with dry stable eschar to incision.. L foot with dorsal abrasion to the 2nd digit with no signs of infection.   - Using sterile suture removal kit medial boggy tissue debrided in excisional manner down to the level of bone and not beyond, increased malodor, no further tracking, pt tolerated procedure well   - R foot cultured   - Recommend admission with IV vancomycin and cefepime   - Recommend ID consult   - Recommend vascular consult for non palpable pulses of R foot   - Ordered LETICIA/PVR   - Podiatry plan is R foot partial 2nd ray amputation pending vascular recs   - Please document medical clearance for procedure under anesthesia   - Discussed with attending  66M w RF 2nd digit wound to bone, healed R foot partial hallux amputation   - Pt seen and evaluated  - Afebrile, no leukocytosis, ESR/CRP pending   - L foot xray" Possible cortical erosion of the tip of the second distal phalanx. Can obtain MR foot to further evaluate for osteomyelitis.  - R foot 2nd digit with distal wound to bone with surrounding necrotic wound bed with malodor, medial bogginess erythema to the level of MTPJ, no crepitus or fluctuance. R foot partial hallux healed with dry stable eschar to incision.. L foot with dorsal abrasion to the 2nd digit with no signs of infection.   - Using sterile suture removal kit medial boggy tissue debrided in excisional manner down to the level of bone and not beyond, increased malodor, no further tracking, pt tolerated procedure well   - R foot cultured   - Recommend admission with IV vancomycin and cefepime   - Recommend ID consult   - Recommend vascular consult for non palpable pulses of R foot   - Ordered LETICIA/PVR   - Podiatry plan is R foot partial 2nd ray amputation pending vascular recs   - Please document medical clearance for procedure under anesthesia   - Discussed with attending

## 2022-11-03 NOTE — ED ADULT NURSE NOTE - OBJECTIVE STATEMENT
pt states, "I had my big toe on my right amputated in september. I went to see my foot doctor and he looked at my foot and told me to come to the ED because my other toe may need to be amputated." pt AOx3 and denies any fevers, lightheadedness, dizziness, chest pain, SOB, abd. pain, n/v/d at present. pt endorses intermittent numbness to feet, pt has PMH of neuropathy. second toe reddened, swollen, warm, with green discharge. distal tip of toe black in color with foul smelling odor from toe.

## 2022-11-03 NOTE — ED PROVIDER NOTE - PHYSICAL EXAMINATION
GENERAL: no acute distress, non-toxic appearing  HEAD: normocephalic, atraumatic  HEENT: normal conjunctiva, oral mucosa moist, neck supple  CARDIAC: regular rate and rhythm, normal S1 and S2,  no appreciable murmurs  PULM: clear to ascultation bilaterally, no crackles, rales, rhonchi, or wheezing  GI: abdomen nondistended, soft, nontender, no guarding or rebound tenderness  NEURO: alert and oriented x 3, normal speech, moving all extremities   MSK: +right foot: amputated first digit, necrosis distally on second toe with surrounding erythema, no ttp, 2+ DP, no peripheral edema, calf tenderness/redness/swelling  SKIN: no visible rashes, dry, well-perfused  PSYCH: appropriate mood and affect

## 2022-11-03 NOTE — ED PROVIDER NOTE - NS ED ROS FT
GENERAL: no fever, no chills, no weight loss  EYES: no change in vision, no irritation, no discharge, no redness, no pain  HEENT: no trouble swallowing or speaking, no throat pain, no ear pain  CARDIAC: no chest pain, no palpitations   PULMONARY: no cough, no shortness of breath, no wheezing  GI: no abdominal pain, no nausea, no vomiting, no diarrhea, no constipation, no melena, no hematochezia, no hematemesis  : no changes in urination, no dysuria, no frequency, no hematuria, no discharge  SKIN: no rashes  NEURO: no headache, no numbness, no weakness  MSK: no joint pain, no muscle pain, no back pain, no calf pain

## 2022-11-03 NOTE — ED PROVIDER NOTE - OBJECTIVE STATEMENT
67 yo M PMHx of DM, liver transplant, HLD, HTN in 2000 presents for possible toe amputation from his podiatrist. Patient had a left big toe amputation Sept 1 and followed up with his podiatrist today who recommended amputation of the second toe as week. Patient has some discomfort in the toe but no pain because he has neuropathy. No fevers, chills, nausea, vomiting.

## 2022-11-03 NOTE — ED PROVIDER NOTE - CLINICAL SUMMARY MEDICAL DECISION MAKING FREE TEXT BOX
67 yo liver transplant pt here for possible toe amputation of second toe on right foot for necrosis, erythema, no infectious symptoms - concern for osteomyelitis, cellulitis. Will get labs, XR, esr, crp. Will give vanc, cefepime.

## 2022-11-04 NOTE — PROGRESS NOTE ADULT - SUBJECTIVE AND OBJECTIVE BOX
Patient is a 66y old  Male who presents with a chief complaint of R toe wound (04 Nov 2022 01:29)       INTERVAL HPI/OVERNIGHT EVENTS:  Patient seen and evaluated at bedside.  Pt is resting comfortable in NAD. Denies N/V/F/C.  Pain rated at X/10    Allergies    codeine (Anaphylaxis)    Intolerances        Vital Signs Last 24 Hrs  T(C): 36.7 (04 Nov 2022 10:30), Max: 37.1 (04 Nov 2022 02:21)  T(F): 98.1 (04 Nov 2022 10:30), Max: 98.7 (04 Nov 2022 02:21)  HR: 62 (04 Nov 2022 10:30) (53 - 72)  BP: 130/78 (04 Nov 2022 10:30) (120/73 - 139/75)  BP(mean): --  RR: 19 (04 Nov 2022 10:30) (16 - 19)  SpO2: 98% (04 Nov 2022 10:30) (98% - 100%)    Parameters below as of 04 Nov 2022 10:30  Patient On (Oxygen Delivery Method): room air        LABS:                        10.5   6.20  )-----------( 221      ( 04 Nov 2022 06:10 )             34.1     11-04    140  |  108  |  33<H>  ----------------------------<  109<H>  4.3   |  23  |  1.73<H>    Ca    8.8      04 Nov 2022 06:11  Mg     2.1     11-04    TPro  6.3  /  Alb  3.2<L>  /  TBili  0.3  /  DBili  x   /  AST  15  /  ALT  33  /  AlkPhos  89  11-04    PT/INR - ( 03 Nov 2022 21:51 )   PT: 12.4 sec;   INR: 1.07 ratio         PTT - ( 04 Nov 2022 06:10 )  PTT:29.3 sec    CAPILLARY BLOOD GLUCOSE      POCT Blood Glucose.: 160 mg/dL (04 Nov 2022 12:16)  POCT Blood Glucose.: 141 mg/dL (04 Nov 2022 11:07)  POCT Blood Glucose.: 102 mg/dL (04 Nov 2022 08:29)  POCT Blood Glucose.: 107 mg/dL (04 Nov 2022 05:48)      Lower Extremity Physical Exam:    Vascular: DP/PT 1/4 to the L foot and 0/4 DP/PT to the R foot, B/L, CFT <3 seconds to the R 3-5 and L 1-5, Temperature gradient warm to cool, B/L.   Neuro: Epicritic sensation diminished to the level of toes, B/L.  Musculoskeletal/Ortho: 9/2 s/p R foot partial hallux healed  Skin: s/p bedside RF 2nd digit debridement: R foot 2nd digit distal probe to bone with necrotic wound bed with surrounding erythema, no purulence or discharge, no crepitus. R partial hallux healed with dry stable eschar. L foot without signs of infection.     RADIOLOGY & ADDITIONAL TESTS:      ******PRELIMINARY REPORT******      ******PRELIMINARY REPORT******       ACC: [id number] EXAM:  XR FOOT COMP MIN 3 VIEWS RT                          PROCEDURE DATE:  11/03/2022    ******PRELIMINARY REPORT******      ******PRELIMINARY REPORT******           INTERPRETATION:  CLINICAL INFORMATION: Right foot pain. Evaluate for   evidence of osteomyelitis.    TECHNIQUE: 4 views of the right foot.    COMPARISON: Right foot radiograph 9/2/2022    FINDINGS:  Right great toe amputation at the level of the proximal phalanx   diaphysis. Possible cortical erosion of the tip of the second distal   phalanx. No soft tissue gas tracking. Vascular calcifications.    IMPRESSION:  Possible cortical erosion of the tip of the second distal phalanx. Can   obtain MR foot to further evaluate for osteomyelitis.        ******PRELIMINARY REPORT******      ******PRELIMINARY REPORT******        NATHAN SWENSON MD; Resident Radiologist  This document is a PRELIMINARY interpretation and is pending final   attending approval. Nov  3 2022 10:23PM   Patient is a 66y old  Male who presents with a chief complaint of R toe wound (04 Nov 2022 01:29)       INTERVAL HPI/OVERNIGHT EVENTS:  Patient seen and evaluated at bedside.  Pt is resting comfortable in NAD. Denies N/V/F/C.  Pain rated at X/10    Allergies    codeine (Anaphylaxis)    Intolerances        Vital Signs Last 24 Hrs  T(C): 36.7 (04 Nov 2022 10:30), Max: 37.1 (04 Nov 2022 02:21)  T(F): 98.1 (04 Nov 2022 10:30), Max: 98.7 (04 Nov 2022 02:21)  HR: 62 (04 Nov 2022 10:30) (53 - 72)  BP: 130/78 (04 Nov 2022 10:30) (120/73 - 139/75)  BP(mean): --  RR: 19 (04 Nov 2022 10:30) (16 - 19)  SpO2: 98% (04 Nov 2022 10:30) (98% - 100%)    Parameters below as of 04 Nov 2022 10:30  Patient On (Oxygen Delivery Method): room air        LABS:                        10.5   6.20  )-----------( 221      ( 04 Nov 2022 06:10 )             34.1     11-04    140  |  108  |  33<H>  ----------------------------<  109<H>  4.3   |  23  |  1.73<H>    Ca    8.8      04 Nov 2022 06:11  Mg     2.1     11-04    TPro  6.3  /  Alb  3.2<L>  /  TBili  0.3  /  DBili  x   /  AST  15  /  ALT  33  /  AlkPhos  89  11-04    PT/INR - ( 03 Nov 2022 21:51 )   PT: 12.4 sec;   INR: 1.07 ratio         PTT - ( 04 Nov 2022 06:10 )  PTT:29.3 sec    CAPILLARY BLOOD GLUCOSE      POCT Blood Glucose.: 160 mg/dL (04 Nov 2022 12:16)  POCT Blood Glucose.: 141 mg/dL (04 Nov 2022 11:07)  POCT Blood Glucose.: 102 mg/dL (04 Nov 2022 08:29)  POCT Blood Glucose.: 107 mg/dL (04 Nov 2022 05:48)      Lower Extremity Physical Exam:    Vascular: DP2/4 B/L, PT 0/4 B/L, CFT <3 seconds to the R 3-5 and L 1-5, Temperature gradient warm to cool, B/L.   Neuro: Epicritic sensation diminished to the level of toes, B/L.  Musculoskeletal/Ortho: 9/2 s/p R foot partial hallux healed  Skin: s/p bedside RF 2nd digit debridement: R foot 2nd digit distal probe to bone with necrotic wound bed with surrounding erythema, no purulence or discharge, no crepitus. R partial hallux healed with dry stable eschar. L foot without signs of infection.     RADIOLOGY & ADDITIONAL TESTS:      ******PRELIMINARY REPORT******      ******PRELIMINARY REPORT******       ACC: [id number] EXAM:  XR FOOT COMP MIN 3 VIEWS RT                          PROCEDURE DATE:  11/03/2022    ******PRELIMINARY REPORT******      ******PRELIMINARY REPORT******           INTERPRETATION:  CLINICAL INFORMATION: Right foot pain. Evaluate for   evidence of osteomyelitis.    TECHNIQUE: 4 views of the right foot.    COMPARISON: Right foot radiograph 9/2/2022    FINDINGS:  Right great toe amputation at the level of the proximal phalanx   diaphysis. Possible cortical erosion of the tip of the second distal   phalanx. No soft tissue gas tracking. Vascular calcifications.    IMPRESSION:  Possible cortical erosion of the tip of the second distal phalanx. Can   obtain MR foot to further evaluate for osteomyelitis.        ******PRELIMINARY REPORT******      ******PRELIMINARY REPORT******        NATHAN SWENSON MD; Resident Radiologist  This document is a PRELIMINARY interpretation and is pending final   attending approval. Nov  3 2022 10:23PM

## 2022-11-04 NOTE — H&P ADULT - ASSESSMENT
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 He had recent admission 8/30 to 9/5/2022 for R toe osteomyelitis, with R partial hallux amputation on 9/2/22. His cultures at that time were growing Bacteroides, GBS, Staph spp. He was discharged home with outpatient PT on 9/5/22. The patient presents to [facility] ED on 11/3/22 from home after being seen by his outpatient podiatrist who recommended amputation of second toe due to concern for worsening infection.     #Osteomyelitis R foot  - Prior cultures growing: GBS, Bacteroides, MSSA  - Given Vanco 1g x1, Cefepime 2g x1 in ED  - Continue Vancomycin, Start Ertapenem (for Bacteroides coverage)   - Follow up blood cultures, wound cultures  - Dankins dressing changes per Podiatry recs  - Will consult ID in AM   - Will consult vascular in AM   - F/U LETICIA     #Hx JEAN cirrhosis s/p OLT 7/2/22  #Hx CMV  - Continue Pred 10  - Holding MMF 250mg BID given concern for osteomyelitis   - Continue holding Everolimus (on hold from prior admission 9/2022)   - Prophylaxis: Valcyte, PPI   - Daily MELD labs     #Hx HFpEF, HLD  - Increased edema, last TTE from 12/2021- repeat TTE ordered  - Consider Lasix IV once on 6Monti  - Continue     #IDDM  - Continue home Lantus 16u qHS, Lispro 12u premeal with

## 2022-11-04 NOTE — PATIENT PROFILE ADULT - IS THERE A SUSPICION OF ABUSE/NEGLIGENCE?
Norma Kate,    Not sure what happened on the appointment today, sorry if it was our mix-up.  I wanted to make sure you saw the labs.  Please follow up with me at your convenience. no

## 2022-11-04 NOTE — CONSULT NOTE ADULT - SUBJECTIVE AND OBJECTIVE BOX
Podiatry pager #: [phone]    Patient is a 66y old  Male who presents with a chief complaint of R foot 2nd toe wound to bone     HPI: 65 yo M PMHx of DM, liver transplant, HLD, HTN in 2000 presents for possible toe amputation from his podiatrist. Patient had a left big toe amputation Sept 1 and followed up with his podiatrist today who recommended amputation of the second toe as week. Pt states that he woke up Tuesday morning with discoloration to the digit, he denies any trauma to it, denies taking any Abx or pain medication for the digit. Was seen in wound care center by Dr. Noyola today who sent him to the hospital. Patient has some discomfort in the toe but no pain because he has neuropathy. No fevers, chills, nausea, vomiting.      PAST MEDICAL & SURGICAL HISTORY:  Diabetes      Hypercholesteremia      Neuropathy      Hypertension      Renal stones  25 years ago      Fatty liver disease, nonalcoholic      Obesity      Hepatic encephalopathy      GERD with esophagitis  Gastritis &amp; Non Bleeding Ulcers      GIB (gastrointestinal bleeding)      S/P cholecystectomy          MEDICATIONS  (STANDING):    MEDICATIONS  (PRN):      Allergies    codeine (Anaphylaxis)    Intolerances        VITALS:    Vital Signs Last 24 Hrs  T(C): 36.8 (03 Nov 2022 16:01), Max: 36.8 (03 Nov 2022 16:01)  T(F): 98.2 (03 Nov 2022 16:01), Max: 98.2 (03 Nov 2022 16:01)  HR: 72 (03 Nov 2022 16:01) (72 - 72)  BP: 139/75 (03 Nov 2022 16:01) (139/75 - 139/75)  BP(mean): --  RR: 18 (03 Nov 2022 16:01) (18 - 18)  SpO2: 99% (03 Nov 2022 16:01) (99% - 99%)    Parameters below as of 03 Nov 2022 16:01  Patient On (Oxygen Delivery Method): room air        LABS:                          11.5   7.39  )-----------( 225      ( 03 Nov 2022 21:51 )             37.5       11-03    139  |  105  |  35<H>  ----------------------------<  175<H>  4.4   |  24  |  1.82<H>    Ca    9.2      03 Nov 2022 21:51    TPro  7.2  /  Alb  3.9  /  TBili  0.3  /  DBili  x   /  AST  22  /  ALT  43  /  AlkPhos  111  11-03      CAPILLARY BLOOD GLUCOSE          PT/INR - ( 03 Nov 2022 21:51 )   PT: 12.4 sec;   INR: 1.07 ratio         PTT - ( 03 Nov 2022 21:51 )  PTT:30.4 sec    LOWER EXTREMITY PHYSICAL EXAM:    Vascular: DP/PT 1/4 to the L foot and 0/4 DP/PT to the R foot, B/L, CFT <3 seconds to the R 3-5 and L 1-5, Temperature gradient warm to cool, B/L.   Neuro: Epicritic sensation diminished to the level of toes, B/L.  Musculoskeletal/Ortho: 9/2 s/p R foot partial hallux healed  Skin: R foot 2nd digit with distal wound to bone with surrounding necrotic wound bed with malodor, medial bogginess erythema to the level of MTPJ, no crepitus or fluctuance. R foot partial hallux healed with dry stable eschar to incision.. L foot with dorsal abrasion to the 2nd digit with no signs of infection.          RADIOLOGY & ADDITIONAL STUDIES:      ******PRELIMINARY REPORT******      ******PRELIMINARY REPORT******       ACC: [id number] EXAM:  XR FOOT COMP MIN 3 VIEWS RT                          PROCEDURE DATE:  11/03/2022    ******PRELIMINARY REPORT******      ******PRELIMINARY REPORT******           INTERPRETATION:  CLINICAL INFORMATION: Right foot pain. Evaluate for   evidence of osteomyelitis.    TECHNIQUE: 4 views of the right foot.    COMPARISON: Right foot radiograph 9/2/2022    FINDINGS:  Right great toe amputation at the level of the proximal phalanx   diaphysis. Possible cortical erosion of the tip of the second distal   phalanx. No soft tissue gas tracking. Vascular calcifications.    IMPRESSION:  Possible cortical erosion of the tip of the second distal phalanx. Can   obtain MR foot to further evaluate for osteomyelitis.        ******PRELIMINARY REPORT******      ******PRELIMINARY REPORT******        NATHAN SWENSON MD; Resident Radiologist  This document is a PRELIMINARY interpretation and is pending final   attending approval. Nov  3 2022 10:23PM  
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 He had recent admission 8/30 to 9/5/2022 for R toe osteomyelitis, with R partial hallux amputation on 9/2/22. His cultures at that time were growing Bacteroides, GBS, Staph spp. He was discharged home with outpatient PT on 9/5/22. The patient presents to [facility] ED On 11/3/22 from home after being seen by his outpatient podiatrist who recommended amputation of second toe due to concern for worsening infection.     Patient reports he had appointment for routine follow up with his podiatrist today, in which his podiatrist was concerned about his R foot near prior amputation site. Patient denies subjective fevers, chills. Reports increased lower extremity edema, to which he reports "usually flares 3 to 4 times a year". He denies sick contacts, cough, shortness of breath.  (04 Nov 2022 01:29)"    Above reviewed. 67 yo M with obesity, DM, CKD, s/p OLT, recent OM, presenting with R toe wound  Recently presented to podiatrist where was noted to have concerned for R toe, sent to hospital for further care  Patient noted some discharge from wound site  No fevers, no chills  ID called for further eval    PAST MEDICAL & SURGICAL HISTORY:  Diabetes      Hypercholesteremia      Neuropathy      Hypertension      Renal stones  25 years ago      Fatty liver disease, nonalcoholic      Obesity      Hepatic encephalopathy      GERD with esophagitis  Gastritis &amp; Non Bleeding Ulcers      GIB (gastrointestinal bleeding)      S/P cholecystectomy    Allergies    codeine (Anaphylaxis)    Intolerances    ANTIMICROBIALS:  ertapenem  IVPB 1000 every 24 hours  valGANciclovir 450 every 24 hours  vancomycin  IVPB 1000 once    OTHER MEDS:  acetaminophen     Tablet .. 975 milliGRAM(s) Oral every 6 hours PRN  chlorhexidine 2% Cloths 1 Application(s) Topical daily  Dakins Solution - 1/4 Strength 1 Application(s) Topical daily  dextrose 5%. 1000 milliLiter(s) IV Continuous <Continuous>  dextrose 5%. 1000 milliLiter(s) IV Continuous <Continuous>  dextrose 50% Injectable 25 Gram(s) IV Push once  dextrose 50% Injectable 12.5 Gram(s) IV Push once  dextrose 50% Injectable 25 Gram(s) IV Push once  dextrose Oral Gel 15 Gram(s) Oral once PRN  folic acid 1 milliGRAM(s) Oral every 24 hours  glucagon  Injectable 1 milliGRAM(s) IntraMuscular once  influenza  Vaccine (HIGH DOSE) 0.7 milliLiter(s) IntraMuscular once  insulin glargine Injectable (LANTUS) 16 Unit(s) SubCutaneous at bedtime  insulin lispro (ADMELOG) corrective regimen sliding scale   SubCutaneous four times a day before meals  insulin lispro Injectable (ADMELOG) 12 Unit(s) SubCutaneous three times a day before meals  omega-3-Acid Ethyl Esters 2 Gram(s) Oral every 12 hours  pantoprazole    Tablet 40 milliGRAM(s) Oral before breakfast  polyethylene glycol 3350 17 Gram(s) Oral every 24 hours PRN  predniSONE   Tablet 10 milliGRAM(s) Oral every 24 hours  senna 2 Tablet(s) Oral at bedtime PRN  sertraline 100 milliGRAM(s) Oral every 24 hours    SOCIAL HISTORY: No tobacco, no alcohol, no illicit drugs    FAMILY HISTORY:  Family history of stomach cancer    Family history of hypertension    Family history of type 2 diabetes mellitus    Drug Dosing Weight  Height (cm): 185.4 (02 Sep 2022 13:53)  Weight (kg): 105 (02 Sep 2022 13:53)  BMI (kg/m2): 30.5 (02 Sep 2022 13:53)  BSA (m2): 2.29 (02 Sep 2022 13:53)    PE:    Vital Signs Last 24 Hrs  T(C): 36.7 (04 Nov 2022 12:54), Max: 37.1 (04 Nov 2022 02:21)  T(F): 98.1 (04 Nov 2022 12:54), Max: 98.7 (04 Nov 2022 02:21)  HR: 70 (04 Nov 2022 12:54) (53 - 70)  BP: 129/75 (04 Nov 2022 12:54) (120/73 - 138/79)  RR: 18 (04 Nov 2022 12:54) (16 - 19)  SpO2: 98% (04 Nov 2022 12:54) (98% - 100%)    Gen: AOx3, NAD, non-toxic, pleasant  CV: S1+S2 normal, nontachycardic  Resp: Clear bilat, no resp distress, no crackles/wheezes  Abd: Soft, nontender, +BS  Ext: R foot, bandaged wound, 2nd toe s/p debridement, some surrounding erythema,  : No Beasley  IV/Skin: No thrombophlebitis  Msk: No low back pain, no arthralgias, no joint swelling  Neuro: No sensory deficits, no motor deficits    LABS:                        10.5   6.20  )-----------( 221      ( 04 Nov 2022 06:10 )             34.1     11-04    140  |  108  |  33<H>  ----------------------------<  109<H>  4.3   |  23  |  1.73<H>    Ca    8.8      04 Nov 2022 06:11  Mg     2.1     11-04    TPro  6.3  /  Alb  3.2<L>  /  TBili  0.3  /  DBili  x   /  AST  15  /  ALT  33  /  AlkPhos  89  11-04    MICROBIOLOGY:  Vancomycin Level, Trough: 7.9 ug/mL (11-04-22 @ 06:10)    Prior culture with MSSA, bacteroides, strep    RADIOLOGY:    11/4    IMPRESSION: Limited study due to vessel noncompressibility. Ankle   brachial indices and segmental pressures were not obtained. Pulse volume   recordings demonstrate no evidence of significant arterial occlusive   disease at either thigh, calf or ankle level.    11/3 XR:    FINDINGS:  The heart is normal in size.  The lungs are clear.  There is no pneumothorax or pleural effusion.    IMPRESSION:  Clear lungs.

## 2022-11-04 NOTE — PROGRESS NOTE ADULT - ASSESSMENT
66M w RF 2nd digit wound to bone, healed R foot partial hallux amputation   - Pt seen and evaluated  - Afebrile, no leukocytosis, ESR 90, CRP 4.4   - L foot xray" Possible cortical erosion of the tip of the second distal phalanx. Can obtain MR foot to further evaluate for osteomyelitis.  - s/p bedside RF 2nd digit debridement: R foot 2nd digit distal probe to bone with necrotic wound bed with surrounding erythema, no purulence or discharge, no crepitus. R partial hallux healed with dry stable eschar. L foot without signs of infection.   - R foot culture pending   - Continue IV antibiotics   - Recommend ID consult   - Ordered R foot MR for extent of OM   - Ordered LETICIA/PVR   - Podiatry plan is R foot partial 2nd ray amputation pending MRI  - Please document medical clearance for procedure under anesthesia   - Seen with attending    66M w RF 2nd digit wound to bone, healed R foot partial hallux amputation   - Pt seen and evaluated  - Afebrile, no leukocytosis, ESR 90, CRP 4.4   - L foot xray" Possible cortical erosion of the tip of the second distal phalanx. Can obtain MR foot to further evaluate for osteomyelitis.  - s/p bedside RF 2nd digit debridement: R foot 2nd digit distal probe to bone with necrotic wound bed with surrounding erythema, no purulence or discharge, no crepitus. R partial hallux healed with dry stable eschar. L foot without signs of infection.   - R foot culture pending   - Continue IV antibiotics   - Recommend ID consult   - Ordered R foot MR for extent of OM   - Ordered LETICIA/PVR (please consult vascular is LETICIA/PVR abnormal)   - Podiatry plan is R foot partial 2nd ray amputation pending MRI  - Please document medical clearance for procedure under anesthesia   - Seen with attending

## 2022-11-04 NOTE — CONSULT NOTE ADULT - ASSESSMENT
65 yo M with obesity, DM, CKD, s/p OLT, recent OM, presenting with R toe wound  No fever, no leukocytosis  Sent in for concern for OM of R second digit  Wound, s/p debridement bedside, culture pending  Prior cultures for recent OM with MSSA, GBS, bacteroides  Overall, OM, wound infection, CKD, S/p OLT  - Zosyn 3.37g q 8  - DC Vanco/Erta  - F/U pending wound culture  - F/U MRI to site  - F/u Podiatry  - Care for transplant/transplant meds per team  - Rebroaden if acute signs sepsis    Miguel Schulte MD  Contact on TEAMS messaging from 9am - 5pm  From 5pm-9am, on weekends, or if no response call [phone] 67 yo M with obesity, DM, CKD, s/p OLT, recent OM, presenting with R toe wound  No fever, no leukocytosis  Sent in for concern for OM of R second digit  Wound, s/p debridement bedside, culture pending  Prior cultures for recent OM with MSSA, GBS, bacteroides  Overall, OM, wound infection, CKD, S/p OLT  - Zosyn 3.375g q 8  - DC Vanco/Erta  - F/U pending wound culture  - F/U MRI to site  - F/u Podiatry/Vasc  - Care for transplant/transplant meds per team  - Rebroaden if acute signs sepsis    Miguel Schulte MD  Contact on TEAMS messaging from 9am - 5pm  From 5pm-9am, on weekends, or if no response call [phone]

## 2022-11-04 NOTE — PATIENT PROFILE ADULT - FALL HARM RISK - HARM RISK INTERVENTIONS

## 2022-11-04 NOTE — H&P ADULT - NSHPLABSRESULTS_GEN_ALL_CORE
Labs:                         11.5   7.39  )-----------( 225      ( 03 Nov 2022 21:51 )             37.5     11-03    139  |  105  |  35<H>  ----------------------------<  175<H>  4.4   |  24  |  1.82<H>    Ca    9.2      03 Nov 2022 21:51    TPro  7.2  /  Alb  3.9  /  TBili  0.3  /  DBili  x   /  AST  22  /  ALT  43  /  AlkPhos  111  11-03      PT/INR - ( 03 Nov 2022 21:51 )   PT: 12.4 sec;   INR: 1.07 ratio    PTT - ( 03 Nov 2022 21:51 )  PTT:30.4 sec    COVID-19 PCR: NotDetec (03 Sep 2022 07:30)  COVID-19 PCR: NotDetec (30 Aug 2022 04:08)  COVID-19 PCR: NotDetec (29 Jun 2022 11:23)    ACC: [id number] EXAM:  XR CHEST PORTABLE URGENT 1V                          PROCEDURE DATE:  11/03/2022    ******PRELIMINARY REPORT******      ******PRELIMINARY REPORT******           INTERPRETATION:  EXAMINATION: XR CHEST URGENT    CLINICAL INDICATION: Screening for infection.    TECHNIQUE: Single frontal, portable view of the chest was obtained.    COMPARISON: Chest x-ray 9/1/2022.    FINDINGS:  The heart is normal in size.  The lungs are clear.  There is no pneumothorax or pleural effusion.    IMPRESSION:  Clear lungs.    ACC: [id number] EXAM:  XR FOOT COMP MIN 3 VIEWS RT                          PROCEDURE DATE:  11/03/2022    ******PRELIMINARY REPORT******      ******PRELIMINARY REPORT******           INTERPRETATION:  CLINICAL INFORMATION: Right foot pain. Evaluate for   evidence of osteomyelitis.    TECHNIQUE: 4 views of the right foot.    COMPARISON: Right foot radiograph 9/2/2022    FINDINGS:  Right great toe amputation at the level of the proximal phalanx   diaphysis. Possible cortical erosion of the tip of the second distal   phalanx. No soft tissue gas tracking. Vascular calcifications.    IMPRESSION:  Possible cortical erosion of the tip of the second distal phalanx. Can   obtain MR foot to further evaluate for osteomyelitis.

## 2022-11-04 NOTE — H&P ADULT - HISTORY OF PRESENT ILLNESS
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 He had recent admission 8/30 to 9/5/2022 for R toe osteomyelitis, with R partial hallux amputation on 9/2/22. His cultures at that time were growing Bacteroides, GBS, Staph spp. He was discharged home with outpatient PT on 9/5/22. The patient presents from home after being seen by his outpatient podiatrist who recommended amputation of second toe due to concern for worsening infection.  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 He had recent admission 8/30 to 9/5/2022 for R toe osteomyelitis, with R partial hallux amputation on 9/2/22. His cultures at that time were growing Bacteroides, GBS, Staph spp. He was discharged home with outpatient PT on 9/5/22. The patient presents to [facility] ED On 11/3/22 from home after being seen by his outpatient podiatrist who recommended amputation of second toe due to concern for worsening infection.     Patient reports he had appointment for routine follow up with his podiatrist today, in which his podiatrist was concerned about his R foot near prior amputation site. Patient denies subjective fevers, chills. Reports increased lower extremity edema, to which he reports "usually flares 3 to 4 times a year". He denies sick contacts, cough, shortness of breath.

## 2022-11-04 NOTE — H&P ADULT - NSHPPHYSICALEXAM_GEN_ALL_CORE
Physical Exam:   General: Well apperaing, resting comfortably in bed in no acute distress   Neuro: Grossly intact bilaterally   HEENT: Normocephalic, atraumatic, trachea midline, no JVD   Chest:   Heart: Regular S1/S2, no murmurs rubs or gallops   Lungs: Unlabored breathing on ***; Clear to auscultation bilaterally, no adventitious sounds   Abdomen: Soft, non-distended, normoactive bowel sounds throughout, no tenderness to palpation in all 4 quadrants   Upper Extremities: No edema, freely mobile bilaterally   Lower Extremities: No edema, SCDs in place, feet warm bilaterally   Skin: Warm, non-diaphoretic throughout Physical Exam:   General: Well appearing male, appears stated age, resting comfortably in bed in no acute distress   Neuro: Grossly intact bilaterally   HEENT: Normocephalic, atraumatic, trachea midline, no JVD; sclera non icteric    Chest: Equal rise and fall of chest   Heart: Regular S1/S2, no murmurs rubs or gallops   Lungs: Unlabored breathing on RA; Clear to auscultation bilaterally, no adventitious sounds   Abdomen: Soft, non-distended, normoactive bowel sounds throughout, no tenderness to palpation in all 4 quadrants   Upper Extremities: No edema, freely mobile bilaterally   Lower Extremities: 4+ pitting edema bilaterally, 2+ PT pulses bilaterally; RLE with warm erythema from ankle to mid calf without crepitus, kerlex around prior amputation site is clean/dry/intact   Skin: Warm, non-diaphoretic throughout, no excoriations noted

## 2022-11-05 NOTE — PROGRESS NOTE ADULT - ASSESSMENT
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 He had recent admission 8/30 to 9/5/2022 for R toe osteomyelitis, with R partial hallux amputation on 9/2/22. His cultures at that time were growing Bacteroides, GBS, Staph spp. He was discharged home with outpatient PT on 9/5/22. The patient presents to [facility] ED on 11/3/22 from home after being seen by his outpatient podiatrist who recommended amputation of second toe due to concern for worsening infection.     #Osteomyelitis R foot  - Prior cultures growing: GBS, Bacteroides, MSSA  - Given Vanco 1g x1, Cefepime 2g x1 in ED  - Continue Vancomycin, Start Ertapenem (for Bacteroides coverage)   - Follow up blood cultures, wound cultures  - Dankins dressing changes per Podiatry recs  - ID consulted on zosyn  - Consult vascular in if LETICIA/ PVR abnormal   - F/U LETICIA   - MRI foot done- f/u results    #Hx JEAN cirrhosis s/p OLT 7/2/22  #Hx CMV  - Inc  Pred to 20  -  restart  bid   - Continue holding Everolimus (on hold from prior admission 9/2022)   - Prophylaxis: Valcyte, PPI   - Daily MELD labs     #Hx HFpEF, HLD  - Increased edema, last TTE from 12/2021- repeat TTE ordered  - Consider Lasix IV once on 6Monti  - Continue     #IDDM  - Continue home Lantus 16u qHS, Lispro 12u premeal with

## 2022-11-05 NOTE — PROGRESS NOTE ADULT - SUBJECTIVE AND OBJECTIVE BOX
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 He had recent admission  to 2022 for R toe osteomyelitis, with R partial hallux amputation on 22. His cultures at that time were growing Bacteroides, GBS, Staph spp. He was discharged home with outpatient PT on 22. The patient presents to [facility] ED On 11/3/22 from home after being seen by his outpatient podiatrist who recommended amputation of second toe due to concern for worsening infection.     Patient reports he had appointment for routine follow up with his podiatrist today, in which his podiatrist was concerned about his R foot near prior amputation site. Patient denies subjective fevers, chills. Reports increased lower extremity edema, to which he reports "usually flares 3 to 4 times a year". He denies sick contacts, cough, shortness of breath.  (2022 01:29)    Interval Events:  Admitted to Tx hepatology service  Podiatry consulted  started on Erta/ Vanco -> ID consulted now on Zosyn per ID       Potential Discharge date: pending clinical improvement    Education:  Medications    Plan of care:  See Below      Immunosupression:   Induction:  completed                                            Maintenance immunosuppression: Everolimus (on hold), MMF on hold, pred 10   Ongoing monitoring for signs of rejection.      MEDICATIONS  (STANDING):  chlorhexidine 2% Cloths 1 Application(s) Topical daily  Dakins Solution - 1/4 Strength 1 Application(s) Topical daily  dextrose 5%. 1000 milliLiter(s) (50 mL/Hr) IV Continuous <Continuous>  dextrose 5%. 1000 milliLiter(s) (100 mL/Hr) IV Continuous <Continuous>  dextrose 50% Injectable 25 Gram(s) IV Push once  dextrose 50% Injectable 12.5 Gram(s) IV Push once  dextrose 50% Injectable 25 Gram(s) IV Push once  folic acid 1 milliGRAM(s) Oral every 24 hours  glucagon  Injectable 1 milliGRAM(s) IntraMuscular once  influenza  Vaccine (HIGH DOSE) 0.7 milliLiter(s) IntraMuscular once  insulin glargine Injectable (LANTUS) 10 Unit(s) SubCutaneous at bedtime  insulin lispro (ADMELOG) corrective regimen sliding scale   SubCutaneous four times a day before meals  insulin lispro Injectable (ADMELOG) 8 Unit(s) SubCutaneous three times a day before meals  mycophenolate mofetil 500 milliGRAM(s) Oral two times a day  omega-3-Acid Ethyl Esters 2 Gram(s) Oral every 12 hours  pantoprazole    Tablet 40 milliGRAM(s) Oral before breakfast  piperacillin/tazobactam IVPB.. 3.375 Gram(s) IV Intermittent every 8 hours  sertraline 100 milliGRAM(s) Oral every 24 hours  valGANciclovir 450 milliGRAM(s) Oral every 24 hours    MEDICATIONS  (PRN):  acetaminophen     Tablet .. 975 milliGRAM(s) Oral every 6 hours PRN Temp greater or equal to 38C (100.4F), Mild Pain (1 - 3), Moderate Pain (4 - 6)  artificial  tears Solution 1 Drop(s) Both EYES every 6 hours PRN Dry Eyes  dextrose Oral Gel 15 Gram(s) Oral once PRN Blood Glucose LESS THAN 70 milliGRAM(s)/deciliter  polyethylene glycol 3350 17 Gram(s) Oral every 24 hours PRN Constipation  senna 2 Tablet(s) Oral at bedtime PRN Constipation      PAST MEDICAL & SURGICAL HISTORY:  Diabetes      Hypercholesteremia      Neuropathy      Hypertension      Renal stones  25 years ago      Fatty liver disease, nonalcoholic      Obesity      Hepatic encephalopathy      GERD with esophagitis  Gastritis &amp; Non Bleeding Ulcers      GIB (gastrointestinal bleeding)      S/P cholecystectomy          Vital Signs Last 24 Hrs  T(C): 36.8 (2022 13:09), Max: 36.9 (2022 21:00)  T(F): 98.2 (2022 13:09), Max: 98.4 (2022 21:00)  HR: 69 (2022 13:09) (65 - 80)  BP: 144/69 (2022 13:09) (120/65 - 144/69)  BP(mean): --  RR: 20 (2022 13:09) (18 - 20)  SpO2: 98% (2022 13:09) (96% - 100%)    Parameters below as of 2022 13:09  Patient On (Oxygen Delivery Method): room air        I&O's Summary    2022 07:01  -  2022 07:00  --------------------------------------------------------  IN: 960 mL / OUT: 1500 mL / NET: -540 mL    2022 08:01  -  2022 17:11  --------------------------------------------------------  IN: 480 mL / OUT: 825 mL / NET: -345 mL                              10.5   5.99  )-----------( 203      ( 2022 06:26 )             32.6     11-05    141  |  110<H>  |  33<H>  ----------------------------<  113<H>  4.3   |  22  |  1.88<H>    Ca    8.6      2022 06:25  Phos  3.6     11-  Mg     2.0         TPro  6.0  /  Alb  3.1<L>  /  TBili  0.2  /  DBili  x   /  AST  13  /  ALT  27  /  AlkPhos  93  11-        Review of systems  Gen: No weight changes, fatigue, fevers/chills, weakness  Skin: No rashes  Head/Eyes/Ears/Mouth: No headache; Normal hearing; Normal vision w/o blurriness; No sinus pain/discomfort, sore throat  Respiratory: No dyspnea, cough, wheezing, hemoptysis  CV: No chest pain, PND, orthopnea  GI: no abdominal pain diarrhea, constipation, nausea, vomiting, melena, hematochezia  : No increased frequency, dysuria, hematuria, nocturia  MSK: No joint pain/swelling; no back pain; no edema  Neuro: No dizziness/lightheadedness, weakness, seizures, numbness, tingling  Heme: No easy bruising or bleeding  Endo: No heat/cold intolerance  Psych: No significant nervousness, anxiety, stress, depression  All other systems were reviewed and are negative, except as noted.      PHYSICAL EXAM:  Constitutional: Well developed / well nourished  Eyes: Anicteric, PERRLA  ENMT: nc/at, no thrush  Neck: supple,   Respiratory: CTA B/L  Cardiovascular: RRR  Gastrointestinal: Soft abdomen, ND, non tender  Genitourinary: Voiding spontaneously  Extremities: warm and no edema  Vascular: Palpable dp pulses bilaterally.   Neurological: A&O x3  Skin: no rashes, ulcerations, lesions  Musculoskeletal: Moving all extremities  Psychiatric: Responsive

## 2022-11-06 NOTE — PROGRESS NOTE ADULT - SUBJECTIVE AND OBJECTIVE BOX
Patient is a 66y old  Male who presents with a chief complaint of R toe wound (05 Nov 2022 17:08)       INTERVAL HPI/OVERNIGHT EVENTS:  Patient seen and evaluated at bedside.  Pt is resting comfortable in NAD. Denies N/V/F/C.  Pain rated at X/10    Allergies    codeine (Anaphylaxis)    Intolerances        Vital Signs Last 24 Hrs  T(C): 36.8 (06 Nov 2022 12:38), Max: 37.2 (05 Nov 2022 17:00)  T(F): 98.2 (06 Nov 2022 12:38), Max: 98.9 (05 Nov 2022 17:00)  HR: 69 (06 Nov 2022 12:38) (65 - 80)  BP: 150/80 (06 Nov 2022 12:38) (137/68 - 155/75)  BP(mean): --  RR: 18 (06 Nov 2022 12:38) (18 - 18)  SpO2: 97% (06 Nov 2022 12:38) (96% - 98%)    Parameters below as of 06 Nov 2022 12:38  Patient On (Oxygen Delivery Method): room air        LABS:                        10.4   7.58  )-----------( 219      ( 06 Nov 2022 07:00 )             32.9     11-06    138  |  107  |  31<H>  ----------------------------<  157<H>  4.2   |  21<L>  |  1.84<H>    Ca    8.8      06 Nov 2022 06:58  Phos  3.4     11-06  Mg     2.1     11-06    TPro  6.4  /  Alb  3.2<L>  /  TBili  0.2  /  DBili  x   /  AST  11  /  ALT  22  /  AlkPhos  93  11-06    PT/INR - ( 06 Nov 2022 07:02 )   PT: 12.2 sec;   INR: 1.05 ratio         PTT - ( 06 Nov 2022 07:02 )  PTT:28.0 sec    CAPILLARY BLOOD GLUCOSE      POCT Blood Glucose.: 242 mg/dL (06 Nov 2022 12:01)  POCT Blood Glucose.: 167 mg/dL (06 Nov 2022 08:51)  POCT Blood Glucose.: 224 mg/dL (05 Nov 2022 21:38)  POCT Blood Glucose.: 240 mg/dL (05 Nov 2022 16:40)      Lower Extremity Physical Exam:  Vascular: DP2/4 B/L, PT 0/4 B/L, CFT <3 seconds to the R 3-5 and L 1-5, Temperature gradient warm to cool, B/L.   Neuro: Epicritic sensation diminished to the level of toes, B/L.  Musculoskeletal/Ortho: 9/2 s/p R foot partial hallux healed  Skin: s/p bedside RF 2nd digit debridement: R foot 2nd digit distal probe to bone with necrotic wound bed with resolved erythema, no purulence or discharge, no crepitus. R partial hallux healed with dry stable eschar. L foot without signs of infection.     RADIOLOGY & ADDITIONAL TESTS:    < from: MR Foot No Cont, Right (11.05.22 @ 11:17) >    ACC: [id number] EXAM:  MR FOOT RT                          PROCEDURE DATE:  11/05/2022          INTERPRETATION:  Clinical indications: Right foot second digit wound   extending to bone.    Multiplanar multisequence noncontrast MRI of the right foot was performed   from the level of the toes the level of the tarsometatarsal articulations.    Correlation is made with radiographs from November 3, 2022.    FINDINGS:    Patient is again noted to be status post amputation of the first toe to   the level of the head of the first proximal phalanx. There is nonspecific   subcortical edema about the distal amputation margin with slightly   decreased T1 marrow signal and overlying soft tissue swelling. Findings   may be related to residual reactive change at the site of amputation.   Possibility of superimposed infection is also in the differential.    There is a deep wound along the distal aspect of the second toe which   extends to bone. Slight increased STIR signal within the distal and   middle phalanges of the second toe with mild associated periosseous   edema. The T1 marrow signal is preserved. Differential considerations   include early osteomyelitis and reactive osteitis.    There is enthesopathic change along the medial aspect of the first   metatarsal head. There is moderate first metatarsophalangeal and hallux   sesamoid joint arthrosis. There is severe arthrosis at the second and   third tarsometatarsal articulations.    Postsurgical changes are seen within the flexor and extensor tendons of   the first toe. There is no evidence of tenosynovitis. There is diffuse   fatty infiltration of the musculature. There Lisfranc ligament is intact.    There is intermetatarsal bursitis at the first and third webspaces.    Soft tissue edema seen throughout the dorsum of the foot which may be   related to cellulitis.    IMPRESSION:    Deep wound along the distal aspect of the second toe extending to bone.   Slight increased STIR signal and periosseous edema about the second toe   distal and middle phalanges with preserved T1 marrow signal. Differential   considerations include early osteomyelitis and reactive osteitis.    Status post amputation of the first toe to the level of the head of the   first proximal phalanx. There is subcortical edema and mildly decreased   T1 marrow signal at the distal amputation margin of the first proximal   phalanx with overlying soft tissue swelling. Differential considerations   include residual reactive edema related to amputation and osteomyelitis.   Continued close interval follow-up is recommended.    --- End of Report ---            ARMAAN TRAYLOR MD; Attending Radiologist  This document has been electronically signed. Nov 5 2022 11:31AM    < end of copied text >

## 2022-11-06 NOTE — PROGRESS NOTE ADULT - ASSESSMENT
66M w RF 2nd digit wound to bone, healed R foot partial hallux amputation   - Pt seen and evaluated  - Afebrile, no leukocytosis   s/p bedside RF 2nd digit debridement: R foot 2nd digit distal probe to bone with necrotic wound bed with resolved erythema, no purulence or discharge, no crepitus. R partial hallux healed with dry stable eschar. L foot without signs of infection.   - L foot xray: Possible cortical erosion of the tip of the second distal phalanx. Can obtain MR foot to further evaluate for osteomyelitis.   - R foot culture >4 organisms aerobic and anaerobic   - Continue IV antibiotics   - R foot MRI: R foot 2nd digit distal phalanx and middle phalanx early OM versus reactive osteitis   - LETICIA/PVR: non-compressible vessels bilateral with normal waveforms at the ankle   - Pod plan for possible right foot partial 2nd digit amputation pending OR availability   - Medical optimization appreciated   - Discussed with attending

## 2022-11-06 NOTE — PROGRESS NOTE ADULT - SUBJECTIVE AND OBJECTIVE BOX
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 He had recent admission  to 2022 for R toe osteomyelitis, with R partial hallux amputation on 22. His cultures at that time were growing Bacteroides, GBS, Staph spp. He was discharged home with outpatient PT on 22. The patient presents to [facility] ED On 11/3/22 from home after being seen by his outpatient podiatrist who recommended amputation of second toe due to concern for worsening infection.     Patient reports he had appointment for routine follow up with his podiatrist today, in which his podiatrist was concerned about his R foot near prior amputation site. Patient denies subjective fevers, chills. Reports increased lower extremity edema, to which he reports "usually flares 3 to 4 times a year". He denies sick contacts, cough, shortness of breath.  (2022 01:29)    Interval Events:  Afebrile, VSS  MRI done- suggestive of osteomyelitis  Podiatry consulted  continued on Zosyn per ID       Potential Discharge date: pending clinical improvement    Education:  Medications    Plan of care:  See Below      Immunosuppression   Induction:  completed                                            Maintenance immunosuppression: Everolimus (on hold), MMF on hold, pred 10   Ongoing monitoring for signs of rejection.      MEDICATIONS  (STANDING):  chlorhexidine 2% Cloths 1 Application(s) Topical daily  Dakins Solution - 1/4 Strength 1 Application(s) Topical daily  dextrose 5%. 1000 milliLiter(s) (50 mL/Hr) IV Continuous <Continuous>  dextrose 5%. 1000 milliLiter(s) (100 mL/Hr) IV Continuous <Continuous>  dextrose 50% Injectable 25 Gram(s) IV Push once  dextrose 50% Injectable 12.5 Gram(s) IV Push once  dextrose 50% Injectable 25 Gram(s) IV Push once  folic acid 1 milliGRAM(s) Oral every 24 hours  glucagon  Injectable 1 milliGRAM(s) IntraMuscular once  influenza  Vaccine (HIGH DOSE) 0.7 milliLiter(s) IntraMuscular once  insulin glargine Injectable (LANTUS) 10 Unit(s) SubCutaneous at bedtime  insulin lispro (ADMELOG) corrective regimen sliding scale   SubCutaneous four times a day before meals  insulin lispro Injectable (ADMELOG) 8 Unit(s) SubCutaneous three times a day before meals  mycophenolate mofetil 500 milliGRAM(s) Oral two times a day  omega-3-Acid Ethyl Esters 2 Gram(s) Oral every 12 hours  pantoprazole    Tablet 40 milliGRAM(s) Oral before breakfast  piperacillin/tazobactam IVPB.. 3.375 Gram(s) IV Intermittent every 8 hours  predniSONE   Tablet 20 milliGRAM(s) Oral daily  sertraline 100 milliGRAM(s) Oral every 24 hours  valGANciclovir 450 milliGRAM(s) Oral every 24 hours    MEDICATIONS  (PRN):  acetaminophen     Tablet .. 975 milliGRAM(s) Oral every 6 hours PRN Temp greater or equal to 38C (100.4F), Mild Pain (1 - 3), Moderate Pain (4 - 6)  artificial  tears Solution 1 Drop(s) Both EYES every 6 hours PRN Dry Eyes  dextrose Oral Gel 15 Gram(s) Oral once PRN Blood Glucose LESS THAN 70 milliGRAM(s)/deciliter  polyethylene glycol 3350 17 Gram(s) Oral every 24 hours PRN Constipation  senna 2 Tablet(s) Oral at bedtime PRN Constipation      PAST MEDICAL & SURGICAL HISTORY:  Diabetes      Hypercholesteremia      Neuropathy      Hypertension      Renal stones  25 years ago      Fatty liver disease, nonalcoholic      Obesity      Hepatic encephalopathy      GERD with esophagitis  Gastritis &amp; Non Bleeding Ulcers      GIB (gastrointestinal bleeding)      S/P cholecystectomy          Vital Signs Last 24 Hrs  T(C): 36.8 (2022 12:38), Max: 37.2 (2022 17:00)  T(F): 98.2 (2022 12:38), Max: 98.9 (2022 17:00)  HR: 69 (2022 12:38) (65 - 80)  BP: 150/80 (2022 12:38) (137/68 - 155/75)  BP(mean): --  RR: 18 (2022 12:38) (18 - 18)  SpO2: 97% (2022 12:38) (96% - 98%)    Parameters below as of 2022 12:38  Patient On (Oxygen Delivery Method): room air        I&O's Summary    2022 08:01  -  2022 07:00  --------------------------------------------------------  IN: 1200 mL / OUT: 2475 mL / NET: -1275 mL    2022 07:01  -  2022 16:33  --------------------------------------------------------  IN: 505 mL / OUT: 500 mL / NET: 5 mL          LABS:                        10.4   7.58  )-----------( 219      ( 2022 07:00 )             32.9     11-06    138  |  107  |  31<H>  ----------------------------<  157<H>  4.2   |  21<L>  |  1.84<H>    Ca    8.8      2022 06:58  Phos  3.4     11-06  Mg     2.1     11-06    TPro  6.4  /  Alb  3.2<L>  /  TBili  0.2  /  DBili  x   /  AST  11  /  ALT  22  /  AlkPhos  93  11-06    PT/INR - ( 2022 07:02 )   PT: 12.2 sec;   INR: 1.05 ratio         PTT - ( 2022 07:02 )  PTT:28.0 sec    CAPILLARY BLOOD GLUCOSE      POCT Blood Glucose.: 242 mg/dL (2022 12:01)  POCT Blood Glucose.: 167 mg/dL (2022 08:51)  POCT Blood Glucose.: 224 mg/dL (2022 21:38)  POCT Blood Glucose.: 240 mg/dL (2022 16:40)    LIVER FUNCTIONS - ( 2022 06:58 )  Alb: 3.2 g/dL / Pro: 6.4 g/dL / ALK PHOS: 93 U/L / ALT: 22 U/L / AST: 11 U/L / GGT: x             Culture - Abscess with Gram Stain (collected 22 @ 23:36)  Source: .Abscess R foot  Final Report (22 @ 09:13):    Culture yields >4 types of aerobic and/or anaerobic bacteria    Call client services within 7 days if further workup is clinically    indicated.    Culture - Blood (collected 22 @ 22:28)  Source: .Blood Blood-Peripheral  Preliminary Report (22 @ 02:02):    No growth to date.    Culture - Blood (collected 22 @ 21:35)  Source: .Blood Blood-Peripheral  Preliminary Report (22 @ 02:02):    No growth to date.          Cultures:    Culture - Abscess with Gram Stain (collected 22 @ 23:36)  Source: .Abscess R foot  Final Report (22 @ :13):    Culture yields >4 types of aerobic and/or anaerobic bacteria    Call client services within 7 days if further workup is clinically    indicated.    Culture - Blood (collected 22 @ 22:28)  Source: .Blood Blood-Peripheral  Preliminary Report (22 @ 02:02):    No growth to date.    Culture - Blood (collected 22 @ 21:35)  Source: .Blood Blood-Peripheral  Preliminary Report (22 @ 02:02):    No growth to date.      Review of systems  Gen: No weight changes, fatigue, fevers/chills, weakness  Skin: No rashes  Head/Eyes/Ears/Mouth: No headache; Normal hearing; Normal vision w/o blurriness; No sinus pain/discomfort, sore throat  Respiratory: No dyspnea, cough, wheezing, hemoptysis  CV: No chest pain, PND, orthopnea  GI: no abdominal pain diarrhea, constipation, nausea, vomiting, melena, hematochezia  : No increased frequency, dysuria, hematuria, nocturia  MSK: No joint pain/swelling; no back pain; no edema  Neuro: No dizziness/lightheadedness, weakness, seizures, numbness, tingling  Heme: No easy bruising or bleeding  Endo: No heat/cold intolerance  Psych: No significant nervousness, anxiety, stress, depression  All other systems were reviewed and are negative, except as noted.      PHYSICAL EXAM:  Constitutional: Well developed / well nourished  Eyes: Anicteric, PERRLA  ENMT: nc/at, no thrush  Neck: supple,   Respiratory: CTA B/L  Cardiovascular: RRR  Gastrointestinal: Soft abdomen, ND, non tender  Genitourinary: Voiding spontaneously  Extremities: warm and no edema  Vascular: Palpable dp pulses bilaterally.   Neurological: A&O x3  Skin: no rashes, ulcerations, lesions  Musculoskeletal: Moving all extremities  Psychiatric: Responsive

## 2022-11-06 NOTE — PROGRESS NOTE ADULT - NS ATTEND AMEND GEN_ALL_CORE FT
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

## 2022-11-06 NOTE — PROGRESS NOTE ADULT - ASSESSMENT
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 He had recent admission 8/30 to 9/5/2022 for R toe osteomyelitis, with R partial hallux amputation on 9/2/22. His cultures at that time were growing Bacteroides, GBS, Staph spp. He was discharged home with outpatient PT on 9/5/22. The patient presents to [facility] ED on 11/3/22 from home after being seen by his outpatient podiatrist who recommended amputation of second toe due to concern for worsening infection.     #Osteomyelitis R foot  - Prior cultures growing: GBS, Bacteroides, MSSA  - Given Vanco 1g x1, Cefepime 2g x1 in ED  - Continue Vancomycin, Start Ertapenem (for Bacteroides coverage)   - Follow up blood cultures, wound cultures  - Dankins dressing changes per Podiatry recs  - ID consulted on zosyn  - Consult vascular in if LETICIA/ PVR abnormal   - F/U LETICIA   - MRI foot done- suggestive osteo  - pod consult - recs partial amp when medically optimized     #Hx JEAN cirrhosis s/p OLT 7/2/22  #Hx CMV  - Inc  Pred to 20  -  restart  bid   - Continue holding Everolimus (on hold from prior admission 9/2022)   - Prophylaxis: Valcyte, PPI   - Daily MELD labs     #Hx HFpEF, HLD  - Increased edema, last TTE from 12/2021- repeat TTE ordered  - Consider Lasix IV once on 6Monti  - Continue     #IDDM  - Continue home Lantus 16u qHS, Lispro 12u premeal with

## 2022-11-07 NOTE — PROGRESS NOTE ADULT - ASSESSMENT
66M w RF 2nd digit wound to bone, healed R foot partial hallux amputation   - Pt seen and evaluated  - Afebrile, no leukocytosis   s/p bedside RF 2nd digit debridement: R foot 2nd digit distal probe to bone with necrotic wound bed with resolved erythema, no purulence or discharge, no crepitus. R partial hallux healed with dry stable eschar. L foot without signs of infection.   - L foot xray: Possible cortical erosion of the tip of the second distal phalanx. Can obtain MR foot to further evaluate for osteomyelitis.   - R foot culture >4 organisms aerobic and anaerobic   - Continue IV antibiotics   - R foot MRI: R foot 2nd digit distal phalanx and middle phalanx early OM versus reactive osteitis   - LETICIA/PVR: non-compressible vessels bilateral with normal waveforms at the ankle   - Patient is booked for right foot partial 2nd ray amputation on Tues 11/8 w Dr Wang   - Medical optimization appreciated   - NPO after midnight  - Seen with attending

## 2022-11-07 NOTE — PROGRESS NOTE ADULT - SUBJECTIVE AND OBJECTIVE BOX
CC: F/U for Wound infection    Saw/spoke to patient. No fevers, no chills. No new complaints.    Allergies  codeine (Anaphylaxis)    ANTIMICROBIALS:  piperacillin/tazobactam IVPB.. 3.375 every 8 hours  valGANciclovir 450 every 24 hours    PE:    Vital Signs Last 24 Hrs  T(C): 36.4 (07 Nov 2022 08:51), Max: 36.9 (07 Nov 2022 00:57)  T(F): 97.6 (07 Nov 2022 08:51), Max: 98.4 (07 Nov 2022 00:57)  HR: 61 (07 Nov 2022 08:51) (61 - 70)  BP: 138/71 (07 Nov 2022 08:51) (138/71 - 147/74)  RR: 20 (07 Nov 2022 08:51) (18 - 20)  SpO2: 96% (07 Nov 2022 08:51) (96% - 98%)    Gen: AOx3, NAD, non-toxic  CV: Nontachycardic  Resp: Breathing comfortably, RA  Abd: Soft, nontender  IV/Skin: No thrombophlebitis    LABS:                        10.3   6.10  )-----------( 228      ( 07 Nov 2022 06:24 )             32.6     11-07    140  |  106  |  32<H>  ----------------------------<  126<H>  4.1   |  23  |  2.29<H>    Ca    8.8      07 Nov 2022 06:25  Phos  3.7     11-07  Mg     2.0     11-07    TPro  6.2  /  Alb  3.3  /  TBili  0.2  /  DBili  x   /  AST  11  /  ALT  21  /  AlkPhos  83  11-07    MICROBIOLOGY:    .Abscess R foot  11-03-22   Culture yields >4 types of aerobic and/or anaerobic bacteria  Call client services within 7 days if further workup is clinically  indicated.  --  --    .Blood Blood-Peripheral  11-03-22   No growth to date.  --  --    .Blood Blood-Peripheral  11-03-22   No growth to date.  --  --    (otherwise reviewed)    RADIOLOGY:    11/5 MR:    IMPRESSION:    Deep wound along the distal aspect of the second toe extending to bone.   Slight increased STIR signal and periosseous edema about the second toe   distal and middle phalanges with preserved T1 marrow signal. Differential   considerations include early osteomyelitis and reactive osteitis.    Status post amputation of the first toe to the level of the head of the   first proximal phalanx. There is subcortical edema and mildly decreased   T1 marrow signal at the distal amputation margin of the first proximal   phalanx with overlying soft tissue swelling. Differential considerations   include residual reactive edema related to amputation and osteomyelitis.   Continued close interval follow-up is recommended.

## 2022-11-07 NOTE — PROGRESS NOTE ADULT - SUBJECTIVE AND OBJECTIVE BOX
Chief Complaint:  Patient is a 66y old  Male who presents with a chief complaint of R toe wound (2022 11:43)      Interval Events: No acute events.      Hospital Medications:  acetaminophen     Tablet .. 975 milliGRAM(s) Oral every 6 hours PRN  artificial  tears Solution 1 Drop(s) Both EYES every 6 hours PRN  chlorhexidine 2% Cloths 1 Application(s) Topical daily  Dakins Solution - 1/4 Strength 1 Application(s) Topical daily  dextrose 5%. 1000 milliLiter(s) IV Continuous <Continuous>  dextrose 5%. 1000 milliLiter(s) IV Continuous <Continuous>  dextrose 50% Injectable 25 Gram(s) IV Push once  dextrose 50% Injectable 12.5 Gram(s) IV Push once  dextrose 50% Injectable 25 Gram(s) IV Push once  dextrose Oral Gel 15 Gram(s) Oral once PRN  folic acid 1 milliGRAM(s) Oral every 24 hours  glucagon  Injectable 1 milliGRAM(s) IntraMuscular once  influenza  Vaccine (HIGH DOSE) 0.7 milliLiter(s) IntraMuscular once  insulin glargine Injectable (LANTUS) 10 Unit(s) SubCutaneous at bedtime  insulin lispro (ADMELOG) corrective regimen sliding scale   SubCutaneous four times a day before meals  insulin lispro Injectable (ADMELOG) 8 Unit(s) SubCutaneous three times a day before meals  mycophenolate mofetil 500 milliGRAM(s) Oral two times a day  omega-3-Acid Ethyl Esters 2 Gram(s) Oral every 12 hours  pantoprazole    Tablet 40 milliGRAM(s) Oral before breakfast  piperacillin/tazobactam IVPB.. 3.375 Gram(s) IV Intermittent every 8 hours  polyethylene glycol 3350 17 Gram(s) Oral every 24 hours PRN  predniSONE   Tablet 20 milliGRAM(s) Oral daily  senna 2 Tablet(s) Oral at bedtime PRN  sertraline 100 milliGRAM(s) Oral every 24 hours  valGANciclovir 450 milliGRAM(s) Oral every 24 hours      PMHX/PSHX:  Diabetes    Hypercholesteremia    Neuropathy    Hypertension    Renal stones    Fatty liver disease, nonalcoholic    Obesity    Hepatic encephalopathy    GERD with esophagitis    GIB (gastrointestinal bleeding)    No significant past surgical history    S/P cholecystectomy            ROS: 14 point ROS negative unless otherwise stated in subjective      PHYSICAL EXAM:     GENERAL:  Well developed, no distress  HEENT:  NC/AT,  conjunctivae clear, sclera anicteric  CHEST:  Full & symmetric excursion, no increased effort w/ respirations  HEART:  Regular rhythm & rate  ABDOMEN:  Soft, non-tender, non-distended; +well healed chevron scar  EXTREMITIES:  +R second toe with dry gangrenous distal tip  SKIN:  No rash/erythema/ecchymoses/petechiae/wounds/jaundice  NEURO:  Alert, orientedx4    Vital Signs:  Vital Signs Last 24 Hrs  T(C): 36.4 (2022 08:51), Max: 36.9 (2022 00:57)  T(F): 97.6 (2022 08:51), Max: 98.4 (2022 00:57)  HR: 61 (2022 08:51) (61 - 70)  BP: 138/71 (2022 08:51) (138/71 - 147/74)  BP(mean): --  RR: 20 (2022 08:51) (18 - 20)  SpO2: 96% (2022 08:51) (96% - 98%)    Parameters below as of 2022 08:51  Patient On (Oxygen Delivery Method): room air      Daily     Daily Weight in k.3 (2022 04:55)    LABS:                        10.3   6.10  )-----------( 228      ( 2022 06:24 )             32.6     11-07    140  |  106  |  32<H>  ----------------------------<  126<H>  4.1   |  23  |  2.29<H>    Ca    8.8      2022 06:25  Phos  3.7     11-07  Mg     2.0     11-07    TPro  6.2  /  Alb  3.3  /  TBili  0.2  /  DBili  x   /  AST  11  /  ALT  21  /  AlkPhos  83  11-07    LIVER FUNCTIONS - ( 2022 06:25 )  Alb: 3.3 g/dL / Pro: 6.2 g/dL / ALK PHOS: 83 U/L / ALT: 21 U/L / AST: 11 U/L / GGT: x           PT/INR - ( 2022 06:24 )   PT: 12.6 sec;   INR: 1.09 ratio         PTT - ( 2022 06:24 )  PTT:27.7 sec        Imaging: No new abdominal imaging

## 2022-11-07 NOTE — PROGRESS NOTE ADULT - ASSESSMENT
67 yo M with obesity, DM, CKD, s/p OLT, recent OM, presenting with R toe wound  No fever, no leukocytosis  Sent in for concern for OM of R second digit  Wound, s/p debridement bedside, culture pending  Prior cultures for recent OM with MSSA, GBS, bacteroides  Overall, OM, wound infection, CKD, S/p OLT  - Zosyn 3.375g q 8  - F/U pending wound culture--contam  - F/u Podiatry/Vasc; OR planning per team  - Care for transplant/transplant meds per team    Miguel Schulte MD  Contact on TEAMS messaging from 9am - 5pm  From 5pm-9am, on weekends, or if no response call [phone]

## 2022-11-07 NOTE — PROGRESS NOTE ADULT - ASSESSMENT
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 He had recent admission 8/30 to 9/5/2022 for R toe osteomyelitis, with R partial hallux amputation on 9/2/22. His cultures at that time were growing Bacteroides, GBS, Staph spp. He was discharged home with outpatient PT on 9/5/22. The patient presents to [facility] ED on 11/3/22 from home after being seen by his outpatient podiatrist who recommended amputation of second toe due to concern for worsening infection.     #Osteomyelitis R foot  - Prior cultures growing: GBS, Bacteroides, MSSA  - Continue Zosyn, Start Ertapenem (for Bacteroides coverage)   - Follow up blood cultures, wound cultures  - Dankins dressing changes per Podiatry recs  - Consult vascular in if LETICIA/ PVR abnormal   - MRI foot done- suggestive osteo  - pod consult - plan for partial toe amp 11/8; NPO after MN; repeat COVID swab today    #Hx JEAN cirrhosis s/p OLT 7/2/22  #Hx CMV  - Inc Pred to 20  - restart  bid   - Continue holding Everolimus (on hold from prior admission 9/2022)   - Prophylaxis: Valcyte, PPI    #Hx HFpEF, HLD  - Increased edema, last TTE from 12/2021- repeat TTE ordered    #IDDM  - Continue home Lantus 16u qHS, Lispro 12u premeal with mISS      **THIS NOTE IS NOT FINALIZED UNTIL SIGNED BY THE ATTENDING**    Margaret Matthew MD  GI Fellow, PGY-5  Available via [employer] Teams    NON-URGENT CONSULTS:  Please email [email or web address]  AT NIGHT AND ON WEEKENDS:  Contact on-call GI fellow via answering service ([phone]) from 5pm-8am and on weekends/holidays  MONDAY-FRIDAY 8AM-5PM:  Pager# [phone] ([facility]) or [phone] ([facility])  GI Phone# [phone] ([facility])   65 y/o M with past medical history significant for obesity, IDDM, HFpEF (LVEF 65% 12/2021), CKD (Bl Cr ~1.6-1.8), 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 He had recent admission 8/30 to 9/5/2022 for R toe osteomyelitis, with R partial hallux amputation on 9/2/22. His cultures at that time were growing Bacteroides, GBS, Staph spp. He was discharged home with outpatient PT on 9/5/22. The patient presents to [facility] ED on 11/3/22 from home after being seen by his outpatient podiatrist who recommended amputation of second toe due to concern for worsening infection.     #Osteomyelitis R foot  - Prior cultures growing: GBS, Bacteroides, MSSA  - Continue Zosyn, Start Ertapenem (for Bacteroides coverage)   - Follow up blood cultures, wound cultures  - Dankins dressing changes per Podiatry recs  - Consult vascular in if LETICIA/ PVR abnormal   - MRI foot done- suggestive osteo  - pod consult - plan for partial toe amp 11/8; NPO after MN; repeat COVID swab today; medically optimized from hepatology perspective to undergo partial amputation    #Hx JEAN cirrhosis s/p OLT 7/2/22  #Hx CMV  - Inc Pred to 20  - restart  bid   - Continue holding Everolimus (on hold from prior admission 9/2022)   - Prophylaxis: Valcyte, PPI    #Hx HFpEF, HLD  - Increased edema, last TTE from 12/2021- repeat TTE 11/4 w/o significant changes compared to prior TTE    #IDDM  - Continue home Lantus 16u qHS, Lispro 12u premeal with mISS      **THIS NOTE IS NOT FINALIZED UNTIL SIGNED BY THE ATTENDING**    Margaret Matthew MD  GI Fellow, PGY-5  Available via [employer] Teams    NON-URGENT CONSULTS:  Please email [email or web address]  AT NIGHT AND ON WEEKENDS:  Contact on-call GI fellow via answering service ([phone]) from 5pm-8am and on weekends/holidays  MONDAY-FRIDAY 8AM-5PM:  Pager# [phone] ([facility]) or [phone] ([facility])  GI Phone# [phone] ([facility])

## 2022-11-07 NOTE — PROGRESS NOTE ADULT - SUBJECTIVE AND OBJECTIVE BOX
Patient is a 66y old  Male who presents with a chief complaint of R toe wound (06 Nov 2022 16:31)       INTERVAL HPI/OVERNIGHT EVENTS:  Patient seen and evaluated at bedside.  Pt is resting comfortable in NAD. Denies N/V/F/C.  Pain rated at X/10    Allergies    codeine (Anaphylaxis)    Intolerances        Vital Signs Last 24 Hrs  T(C): 36.4 (07 Nov 2022 08:51), Max: 36.9 (07 Nov 2022 00:57)  T(F): 97.6 (07 Nov 2022 08:51), Max: 98.4 (07 Nov 2022 00:57)  HR: 61 (07 Nov 2022 08:51) (61 - 70)  BP: 138/71 (07 Nov 2022 08:51) (138/71 - 150/80)  BP(mean): --  RR: 20 (07 Nov 2022 08:51) (18 - 20)  SpO2: 96% (07 Nov 2022 08:51) (96% - 98%)    Parameters below as of 07 Nov 2022 08:51  Patient On (Oxygen Delivery Method): room air        LABS:                        10.3   6.10  )-----------( 228      ( 07 Nov 2022 06:24 )             32.6     11-07    140  |  106  |  32<H>  ----------------------------<  126<H>  4.1   |  23  |  2.29<H>    Ca    8.8      07 Nov 2022 06:25  Phos  3.7     11-07  Mg     2.0     11-07    TPro  6.2  /  Alb  3.3  /  TBili  0.2  /  DBili  x   /  AST  11  /  ALT  21  /  AlkPhos  83  11-07    PT/INR - ( 07 Nov 2022 06:24 )   PT: 12.6 sec;   INR: 1.09 ratio         PTT - ( 07 Nov 2022 06:24 )  PTT:27.7 sec    CAPILLARY BLOOD GLUCOSE      POCT Blood Glucose.: 120 mg/dL (07 Nov 2022 08:03)  POCT Blood Glucose.: 183 mg/dL (06 Nov 2022 21:03)  POCT Blood Glucose.: 178 mg/dL (06 Nov 2022 16:37)  POCT Blood Glucose.: 242 mg/dL (06 Nov 2022 12:01)      Lower Extremity Physical Exam:  Vascular: DP2/4 B/L, PT 0/4 B/L, CFT <3 seconds to the R 3-5 and L 1-5, Temperature gradient warm to cool, B/L.   Neuro: Epicritic sensation diminished to the level of toes, B/L.  Musculoskeletal/Ortho: 9/2 s/p R foot partial hallux healed  Skin: s/p bedside RF 2nd digit debridement: R foot 2nd digit distal probe to bone with necrotic wound bed with resolved erythema, no purulence or discharge, no crepitus. R partial hallux healed with dry stable eschar. L foot without signs of infection.     RADIOLOGY & ADDITIONAL TESTS:

## 2022-11-08 NOTE — PROGRESS NOTE ADULT - ASSESSMENT
67 yo M with obesity, DM, CKD, s/p OLT, recent OM, presenting with R toe wound  No fever, no leukocytosis  Sent in for concern for OM of R second digit  Wound, s/p debridement bedside, culture pending  Prior cultures for recent OM with MSSA, GBS, bacteroides  Overall, OM, wound infection, CKD, S/p OLT  - Zosyn 3.375g q 8  - F/U pending wound culture--contam  - F/u Podiatry/Vasc; OR planning per team  - Care for transplant/transplant meds per team    Miguel Schulte MD  Contact on TEAMS messaging from 9am - 5pm  From 5pm-9am, on weekends, or if no response call [phone]

## 2022-11-08 NOTE — PROGRESS NOTE ADULT - SUBJECTIVE AND OBJECTIVE BOX
Patient is a 66y old  Male who presents with a chief complaint of R toe wound (07 Nov 2022 13:29)      INTERVAL HPI/OVERNIGHT EVENTS:   Pt is scheduled for left foot partial 2nd ray amputation with Dr. Wang at 3pm. Patient is aware of procedure and is NPO since midnight.    MEDICATIONS  (STANDING):  chlorhexidine 2% Cloths 1 Application(s) Topical daily  Dakins Solution - 1/4 Strength 1 Application(s) Topical daily  dextrose 5%. 1000 milliLiter(s) (50 mL/Hr) IV Continuous <Continuous>  dextrose 5%. 1000 milliLiter(s) (100 mL/Hr) IV Continuous <Continuous>  dextrose 50% Injectable 25 Gram(s) IV Push once  dextrose 50% Injectable 12.5 Gram(s) IV Push once  dextrose 50% Injectable 25 Gram(s) IV Push once  folic acid 1 milliGRAM(s) Oral every 24 hours  glucagon  Injectable 1 milliGRAM(s) IntraMuscular once  influenza  Vaccine (HIGH DOSE) 0.7 milliLiter(s) IntraMuscular once  insulin glargine Injectable (LANTUS) 5 Unit(s) SubCutaneous at bedtime  insulin lispro (ADMELOG) corrective regimen sliding scale   SubCutaneous four times a day before meals  insulin lispro Injectable (ADMELOG) 8 Unit(s) SubCutaneous three times a day before meals  mycophenolate mofetil 500 milliGRAM(s) Oral two times a day  omega-3-Acid Ethyl Esters 2 Gram(s) Oral every 12 hours  pantoprazole    Tablet 40 milliGRAM(s) Oral before breakfast  piperacillin/tazobactam IVPB.. 3.375 Gram(s) IV Intermittent every 8 hours  predniSONE   Tablet 20 milliGRAM(s) Oral daily  sertraline 100 milliGRAM(s) Oral every 24 hours  valGANciclovir 450 milliGRAM(s) Oral every 24 hours    MEDICATIONS  (PRN):  acetaminophen     Tablet .. 975 milliGRAM(s) Oral every 6 hours PRN Temp greater or equal to 38C (100.4F), Mild Pain (1 - 3), Moderate Pain (4 - 6)  artificial  tears Solution 1 Drop(s) Both EYES every 6 hours PRN Dry Eyes  dextrose Oral Gel 15 Gram(s) Oral once PRN Blood Glucose LESS THAN 70 milliGRAM(s)/deciliter  polyethylene glycol 3350 17 Gram(s) Oral every 24 hours PRN Constipation  senna 2 Tablet(s) Oral at bedtime PRN Constipation      Allergies    codeine (Anaphylaxis)    Intolerances        Vital Signs Last 24 Hrs  T(C): 36.5 (08 Nov 2022 09:01), Max: 36.8 (07 Nov 2022 17:00)  T(F): 97.7 (08 Nov 2022 09:01), Max: 98.3 (07 Nov 2022 17:00)  HR: 64 (08 Nov 2022 09:01) (64 - 73)  BP: 138/75 (08 Nov 2022 09:01) (135/73 - 147/81)  BP(mean): --  RR: 18 (08 Nov 2022 09:01) (18 - 18)  SpO2: 99% (08 Nov 2022 09:01) (95% - 99%)    Parameters below as of 08 Nov 2022 09:01  Patient On (Oxygen Delivery Method): room air        LABS:                        11.2   6.48  )-----------( 238      ( 08 Nov 2022 06:24 )             35.6     11-08    139  |  106  |  31<H>  ----------------------------<  126<H>  4.5   |  23  |  1.89<H>    Ca    9.1      08 Nov 2022 06:24  Phos  3.8     11-08  Mg     2.0     11-08    TPro  6.2  /  Alb  3.3  /  TBili  0.2  /  DBili  x   /  AST  11  /  ALT  21  /  AlkPhos  83  11-07    PT/INR - ( 08 Nov 2022 06:26 )   PT: 11.7 sec;   INR: 1.01 ratio         PTT - ( 08 Nov 2022 06:26 )  PTT:28.3 sec    CAPILLARY BLOOD GLUCOSE      POCT Blood Glucose.: 149 mg/dL (08 Nov 2022 08:37)  POCT Blood Glucose.: 154 mg/dL (08 Nov 2022 01:02)  POCT Blood Glucose.: 158 mg/dL (07 Nov 2022 22:39)  POCT Blood Glucose.: 159 mg/dL (07 Nov 2022 21:10)  POCT Blood Glucose.: 159 mg/dL (07 Nov 2022 17:15)  POCT Blood Glucose.: 157 mg/dL (07 Nov 2022 12:03)      RADIOLOGY & ADDITIONAL TESTS:    Plan:   To OR today at 3pm with Dr. Wang for  left foot partial 2nd ray amputation.   CXR on sunrise.  EKG on sunrise.  Medical clearance since 11/7 and documented in chart.  Consent signed and in chart.  Procedure was explained to patient in detail. All alternatives, risks and complications were discussed. All questions answered.

## 2022-11-08 NOTE — PROGRESS NOTE ADULT - SUBJECTIVE AND OBJECTIVE BOX
CC: F/U for OM    Saw/spoke to patient. No fevers, no chills. No new complaints.    Allergies  codeine (Anaphylaxis)    ANTIMICROBIALS:  piperacillin/tazobactam IVPB.. 3.375 every 8 hours  valGANciclovir 450 every 24 hours    PE:    Vital Signs Last 24 Hrs  T(C): 36.6 (08 Nov 2022 13:00), Max: 36.8 (07 Nov 2022 17:00)  T(F): 97.9 (08 Nov 2022 13:00), Max: 98.3 (07 Nov 2022 17:00)  HR: 69 (08 Nov 2022 13:00) (64 - 73)  BP: 136/75 (08 Nov 2022 13:00) (135/73 - 147/81)  RR: 18 (08 Nov 2022 13:00) (18 - 18)  SpO2: 97% (08 Nov 2022 13:00) (95% - 99%)    Gen: AOx3, NAD, non-toxic  CV: Nontachycardic  Resp: Breathing comfortably, RA  Abd: Soft, nontender  IV/Skin: No thrombophlebitis    LABS:                        11.2   6.48  )-----------( 238      ( 08 Nov 2022 06:24 )             35.6     11-08    139  |  106  |  31<H>  ----------------------------<  126<H>  4.5   |  23  |  1.89<H>    Ca    9.1      08 Nov 2022 06:24  Phos  3.8     11-08  Mg     2.0     11-08    TPro  6.2  /  Alb  3.3  /  TBili  0.2  /  DBili  x   /  AST  11  /  ALT  21  /  AlkPhos  83  11-07    MICROBIOLOGY:    .Abscess R foot  11-03-22   Culture yields >4 types of aerobic and/or anaerobic bacteria  Call client services within 7 days if further workup is clinically  indicated.  --  --    .Blood Blood-Peripheral  11-03-22   No growth to date.  --  --    .Blood Blood-Peripheral  11-03-22   No growth to date.  --  --    RADIOLOGY:    11/5 MR:    IMPRESSION:    Deep wound along the distal aspect of the second toe extending to bone.   Slight increased STIR signal and periosseous edema about the second toe   distal and middle phalanges with preserved T1 marrow signal. Differential   considerations include early osteomyelitis and reactive osteitis.    Status post amputation of the first toe to the level of the head of the   first proximal phalanx. There is subcortical edema and mildly decreased   T1 marrow signal at the distal amputation margin of the first proximal   phalanx with overlying soft tissue swelling. Differential considerations   include residual reactive edema related to amputation and osteomyelitis.   Continued close interval follow-up is recommended.

## 2022-11-08 NOTE — PROGRESS NOTE ADULT - ASSESSMENT
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 He had recent admission 8/30 to 9/5/2022 for R toe osteomyelitis, with R partial hallux amputation on 9/2/22. His cultures at that time were growing Bacteroides, GBS, Staph spp. He was discharged home with outpatient PT on 9/5/22. The patient presents to [facility] ED on 11/3/22 from home after being seen by his outpatient podiatrist who recommended amputation of second toe due to concern for worsening infection.     #Osteomyelitis R foot - Prior cultures growing: GBS, Bacteroides, MSSA  #Hx JEAN cirrhosis s/p OLT 7/2/22  #Hx CMV  #Hx HFpEF, HLD  #IDDM    Plan:  - Continue Zosyn, Start Ertapenem (for Bacteroides coverage)   - Follow up blood cultures, wound cultures  - Dankins dressing changes per Podiatry recs  - Consult vascular in if LETICIA/ PVR abnormal   - MRI foot done- suggestive osteo  - pod consult - plan for partial toe amp today medically optimized from hepatology perspective to undergo partial amputation  - c/w Pred to 20  - c/w  bid   - Continue holding Everolimus (on hold from prior admission 9/2022)   - Prophylaxis: Valcyte, PPI  - Continue home Lantus 16u qHS, Lispro 12u premeal with mISS    Preliminary note until signed by Attending.    Thank you for involving us in this patient's care.    Charlotte Alcocer MD  Gastroenterology/Hepatology Fellow, PGY-VI    NON-URGENT CONSULTS:  Please email [email or web address]  AT NIGHT AND ON WEEKENDS:  Contact on-call GI fellow via answering service ([phone]) from 5pm-8am and on weekends/holidays  MONDAY-FRIDAY 8AM-5PM:  Pager# [phone] ([facility])  GI Phone# [phone] ([facility])- Increased edema, last TTE from 12/2021- repeat TTE 11/4 w/o significant changes compared to prior TTE

## 2022-11-08 NOTE — PROGRESS NOTE ADULT - SUBJECTIVE AND OBJECTIVE BOX
Chief Complaint:  Patient is a 66y old  Male who presents with a chief complaint of R toe wound (2022 09:53)      Interval Events:   Afebrile and HDS     Hospital Medications:  acetaminophen     Tablet .. 975 milliGRAM(s) Oral every 6 hours PRN  artificial  tears Solution 1 Drop(s) Both EYES every 6 hours PRN  chlorhexidine 2% Cloths 1 Application(s) Topical daily  Dakins Solution - 1/4 Strength 1 Application(s) Topical daily  dextrose 5%. 1000 milliLiter(s) IV Continuous <Continuous>  dextrose 5%. 1000 milliLiter(s) IV Continuous <Continuous>  dextrose 50% Injectable 25 Gram(s) IV Push once  dextrose 50% Injectable 12.5 Gram(s) IV Push once  dextrose 50% Injectable 25 Gram(s) IV Push once  dextrose Oral Gel 15 Gram(s) Oral once PRN  folic acid 1 milliGRAM(s) Oral every 24 hours  glucagon  Injectable 1 milliGRAM(s) IntraMuscular once  influenza  Vaccine (HIGH DOSE) 0.7 milliLiter(s) IntraMuscular once  insulin glargine Injectable (LANTUS) 5 Unit(s) SubCutaneous at bedtime  insulin lispro (ADMELOG) corrective regimen sliding scale   SubCutaneous four times a day before meals  insulin lispro Injectable (ADMELOG) 8 Unit(s) SubCutaneous three times a day before meals  mycophenolate mofetil 500 milliGRAM(s) Oral two times a day  omega-3-Acid Ethyl Esters 2 Gram(s) Oral every 12 hours  pantoprazole    Tablet 40 milliGRAM(s) Oral before breakfast  piperacillin/tazobactam IVPB.. 3.375 Gram(s) IV Intermittent every 8 hours  polyethylene glycol 3350 17 Gram(s) Oral every 24 hours PRN  predniSONE   Tablet 20 milliGRAM(s) Oral daily  senna 2 Tablet(s) Oral at bedtime PRN  sertraline 100 milliGRAM(s) Oral every 24 hours  valGANciclovir 450 milliGRAM(s) Oral every 24 hours      ROS:   Complete and normal except as mentioned above.    PHYSICAL EXAM:   Vital Signs:  Vital Signs Last 24 Hrs  T(C): 36.5 (2022 09:01), Max: 36.8 (2022 17:00)  T(F): 97.7 (2022 09:01), Max: 98.3 (2022 17:00)  HR: 64 (2022 09:01) (64 - 73)  BP: 138/75 (2022 09:01) (135/73 - 147/81)  BP(mean): --  RR: 18 (2022 09:01) (18 - 18)  SpO2: 99% (2022 09:01) (95% - 99%)    Parameters below as of 2022 09:01  Patient On (Oxygen Delivery Method): room air      Daily     Daily Weight in k.8 (2022 05:00)    GENERAL:  Well developed, no distress  HEENT:  NC/AT,  conjunctivae clear, sclera anicteric  CHEST:  Full & symmetric excursion, no increased effort w/ respirations  HEART:  Regular rhythm & rate  ABDOMEN:  Soft, non-tender, non-distended; +well healed chevron scar  EXTREMITIES:  +R second toe with dry gangrenous distal tip  SKIN:  No rash/erythema/ecchymoses/petechiae/wounds/jaundice  NEURO:  Alert, orientedx4    LABS: reviewed                        11.2   6.48  )-----------( 238      ( 2022 06:24 )             35.6     11-08    139  |  106  |  31<H>  ----------------------------<  126<H>  4.5   |  23  |  1.89<H>    Ca    9.1      2022 06:24  Phos  3.8     11-08  Mg     2.0     11-08    TPro  6.2  /  Alb  3.3  /  TBili  0.2  /  DBili  x   /  AST  11  /  ALT  21  /  AlkPhos  83  11-07    LIVER FUNCTIONS - ( 2022 06:25 )  Alb: 3.3 g/dL / Pro: 6.2 g/dL / ALK PHOS: 83 U/L / ALT: 21 U/L / AST: 11 U/L / GGT: x             Interval Diagnostic Studies: see sunrise for full report

## 2022-11-09 NOTE — BRIEF OPERATIVE NOTE - COMMENTS
low concern for residual infection, low concern for viability. pt can be discharged as soon as tomorrow. pod plan to follow up with OR data/ID recs.

## 2022-11-09 NOTE — PROGRESS NOTE ADULT - SUBJECTIVE AND OBJECTIVE BOX
Patient is a 66y old  Male who presents with a chief complaint of R toe wound (08 Nov 2022 12:15)      INTERVAL HPI/OVERNIGHT EVENTS:   Pt is scheduled for right foot partial 2nd ray amputation with Dr. Wang at 4:30pm. Patient is aware of procedure and is NPO since midnight.    MEDICATIONS  (STANDING):  chlorhexidine 2% Cloths 1 Application(s) Topical daily  Dakins Solution - 1/4 Strength 1 Application(s) Topical daily  dextrose 5%. 1000 milliLiter(s) (50 mL/Hr) IV Continuous <Continuous>  dextrose 5%. 1000 milliLiter(s) (100 mL/Hr) IV Continuous <Continuous>  dextrose 50% Injectable 25 Gram(s) IV Push once  dextrose 50% Injectable 12.5 Gram(s) IV Push once  dextrose 50% Injectable 25 Gram(s) IV Push once  folic acid 1 milliGRAM(s) Oral every 24 hours  glucagon  Injectable 1 milliGRAM(s) IntraMuscular once  influenza  Vaccine (HIGH DOSE) 0.7 milliLiter(s) IntraMuscular once  insulin glargine Injectable (LANTUS) 5 Unit(s) SubCutaneous at bedtime  insulin lispro (ADMELOG) corrective regimen sliding scale   SubCutaneous every 6 hours  insulin lispro Injectable (ADMELOG) 8 Unit(s) SubCutaneous three times a day before meals  mycophenolate mofetil 500 milliGRAM(s) Oral two times a day  omega-3-Acid Ethyl Esters 2 Gram(s) Oral every 12 hours  pantoprazole    Tablet 40 milliGRAM(s) Oral before breakfast  piperacillin/tazobactam IVPB.. 3.375 Gram(s) IV Intermittent every 8 hours  predniSONE   Tablet 20 milliGRAM(s) Oral daily  sertraline 100 milliGRAM(s) Oral every 24 hours  valGANciclovir 450 milliGRAM(s) Oral every 24 hours    MEDICATIONS  (PRN):  acetaminophen     Tablet .. 975 milliGRAM(s) Oral every 6 hours PRN Temp greater or equal to 38C (100.4F), Mild Pain (1 - 3), Moderate Pain (4 - 6)  artificial  tears Solution 1 Drop(s) Both EYES every 6 hours PRN Dry Eyes  dextrose Oral Gel 15 Gram(s) Oral once PRN Blood Glucose LESS THAN 70 milliGRAM(s)/deciliter  polyethylene glycol 3350 17 Gram(s) Oral every 24 hours PRN Constipation  senna 2 Tablet(s) Oral at bedtime PRN Constipation      Allergies    codeine (Anaphylaxis)    Intolerances        Vital Signs Last 24 Hrs  T(C): 36.5 (09 Nov 2022 05:00), Max: 36.6 (08 Nov 2022 13:00)  T(F): 97.7 (09 Nov 2022 05:00), Max: 97.9 (08 Nov 2022 13:00)  HR: 61 (09 Nov 2022 05:00) (61 - 77)  BP: 152/70 (09 Nov 2022 05:00) (110/54 - 152/70)  BP(mean): --  RR: 18 (09 Nov 2022 05:00) (18 - 18)  SpO2: 97% (09 Nov 2022 05:00) (95% - 99%)    Parameters below as of 09 Nov 2022 05:00  Patient On (Oxygen Delivery Method): room air        LABS:                        11.8   6.40  )-----------( 266      ( 09 Nov 2022 06:28 )             36.7     11-09    138  |  106  |  32<H>  ----------------------------<  151<H>  4.4   |  23  |  1.91<H>    Ca    8.9      09 Nov 2022 06:34  Phos  3.8     11-08  Mg     2.0     11-08      PT/INR - ( 09 Nov 2022 06:31 )   PT: 11.7 sec;   INR: 1.02 ratio         PTT - ( 09 Nov 2022 06:31 )  PTT:27.9 sec    CAPILLARY BLOOD GLUCOSE      POCT Blood Glucose.: 139 mg/dL (09 Nov 2022 05:35)  POCT Blood Glucose.: 232 mg/dL (09 Nov 2022 00:49)  POCT Blood Glucose.: 182 mg/dL (08 Nov 2022 21:14)  POCT Blood Glucose.: 149 mg/dL (08 Nov 2022 15:55)  POCT Blood Glucose.: 187 mg/dL (08 Nov 2022 12:17)  POCT Blood Glucose.: 149 mg/dL (08 Nov 2022 08:37)      RADIOLOGY & ADDITIONAL TESTS:    Plan:   To OR today at 4:30pm with Dr. Wang for right foot partial 2nd ray amputation .   CXR on sunrise.  EKG on sunrise.  Medical clearance since 11/7 and documented in chart.  Consent signed and in chart.  Procedure was explained to patient in detail. All alternatives, risks and complications were discussed. All questions answered.

## 2022-11-09 NOTE — DISCHARGE NOTE PROVIDER - NSDCFUADDAPPT_GEN_ALL_CORE_FT
Podiatry Discharge Instructions:  Follow up: Please follow up with Dr. Wang within 1 week of discharge from the hospital, please call [phone] for appointment and discuss that you recently were seen in the hospital.  Wound Care: Please leave your dressing clean dry intact until your follow up appointment.  Weight bearing: Please weight bear as tolerated on right heel in a surgical shoe.  Antibiotics: Please continue as instructed. Podiatry Discharge Instructions:  Follow up: Please follow up with Dr. Wang within 1 week of discharge from the hospital, please call [phone] for appointment and discuss that you recently were seen in the hospital.  Wound Care: Please leave your dressing clean dry intact until your follow up appointment.  Weight bearing: Please weight bear as tolerated on right heel in a surgical shoe.  Antibiotics: Please continue as instructed.    TRANSPLANT:  Follow-up in the Transplant clinic in 1-2 weeks with Dr. Medina  Phone: [phone]

## 2022-11-09 NOTE — PHYSICAL THERAPY INITIAL EVALUATION ADULT - ADDITIONAL COMMENTS
Pt lives with children in a private home, 3 steps to negotiate. PTA pt is (I) with ADLs and functional mobility using either cane or RW.

## 2022-11-09 NOTE — BRIEF OPERATIVE NOTE - OPERATION/FINDINGS
s/p right foot partial second toe amputation: bone at proximal resection was hard and of good quality. minimal intra-op bleeding. closed with 3.0 nylon

## 2022-11-09 NOTE — PHYSICAL THERAPY INITIAL EVALUATION ADULT - PERTINENT HX OF CURRENT PROBLEM, REHAB EVAL
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 He had recent admission 8/30 to 9/5/2022 for R toe osteomyelitis, with R partial hallux amputation on 9/2/22. The patient presents to [facility] ED On 11/3/22 from home after being seen by his outpatient podiatrist who recommended amputation of second toe due to concern for worsening infection. s/p right foot partial second toe amputation: on 11/9

## 2022-11-09 NOTE — BRIEF OPERATIVE NOTE - SPECIMENS
pathology: right foot 2nd digit necrotic tissue and bone, right foot 2nd clean bone margin, micro: right foot second digit clean bone margin

## 2022-11-09 NOTE — DISCHARGE NOTE PROVIDER - CARE PROVIDER_API CALL
Yomi Medina (CHERI; MS)  Gastroenterology; Internal Medicine; Transplant Hepatology  [address] [place] [address]  Phone: [phone]  Fax: [phone]  Follow Up Time:

## 2022-11-09 NOTE — PHYSICAL THERAPY INITIAL EVALUATION ADULT - GENERAL OBSERVATIONS, REHAB EVAL
Pt encountered supine in bed, AO x3 R foot bandage, + IV and PACU monitoring Pt encountered supine in bed, AO x4,  R foot bandage, + IV and PACU monitoring. BG = 295. Pt pleasant and cooperative.

## 2022-11-09 NOTE — DISCHARGE NOTE PROVIDER - NSDCCPCAREPLAN_GEN_ALL_CORE_FT
PRINCIPAL DISCHARGE DIAGNOSIS  Diagnosis: Acute osteomyelitis  Assessment and Plan of Treatment: You underwent partial amputation of your right second toe.   Please continue your antibiotics as prescribed  Follow-up with Podiatry as recommended      SECONDARY DISCHARGE DIAGNOSES  Diagnosis: H/O liver transplant  Assessment and Plan of Treatment: Continue to take your medications as prescribed.    Follow-up at the Transplant Clinic with your Hepatologist in 1 week  - Call transplant clinic if you develop fever, increased abdominal pain  - Follow FOOD SAFETY instructions provided to you in your patient education guide booklet       Diagnosis: Immunosuppression  Assessment and Plan of Treatment: - Transplant recipients are at risk for developing infection because immune system is lowered due to transplant medications.   - Avoid contact with sick people; practice good hand hygiene;   - Take medications as directed by your translant team. Never stop taking medications unless instructed by your transplant physician.  - Do NOT double up medication dose if you missed your dose; call the transplant office for instructions.        Diagnosis: Diabetes mellitus  Assessment and Plan of Treatment: If you have diabetes, you may need to monitor your blood sugar more frequently after transplant. Uncongrolled blood sugar levels can lead to poor wound healing and other complications. Follow a low carb and low sugar diet. Continue to take all anti-diabetic medications/insulin as prescribed. Follow up with your Primary Care Doctor regularly for blood sugar/A1c checks. Follow up with an opthalmologist and a podiatrist on an annual basis.  diabetes

## 2022-11-09 NOTE — PROGRESS NOTE ADULT - SUBJECTIVE AND OBJECTIVE BOX
CC: F/U for OM    Saw/spoke to patient. No fevers, no chills. No new complaints.    Allergies  codeine (Anaphylaxis)    ANTIMICROBIALS:  piperacillin/tazobactam IVPB.. 3.375 every 8 hours  valGANciclovir 450 every 24 hours    PE:    Vital Signs Last 24 Hrs  T(C): 36.3 (09 Nov 2022 13:00), Max: 36.5 (09 Nov 2022 01:00)  T(F): 97.4 (09 Nov 2022 13:00), Max: 97.7 (09 Nov 2022 01:00)  HR: 65 (09 Nov 2022 13:00) (61 - 77)  BP: 133/72 (09 Nov 2022 13:00) (110/54 - 152/70)  RR: 18 (09 Nov 2022 13:00) (18 - 18)  SpO2: 100% (09 Nov 2022 13:00) (95% - 100%)    Gen: AOx3, NAD, non-toxic  CV: Nontachycardic  Resp: Breathing comfortably, RA  Abd: Soft, nontender  Ext: bandaged wound    LABS:                        11.0   6.64  )-----------( 272      ( 09 Nov 2022 07:25 )             35.6     11-09    138  |  106  |  32<H>  ----------------------------<  151<H>  4.4   |  23  |  1.91<H>    Ca    8.9      09 Nov 2022 06:34  Phos  3.8     11-09  Mg     2.0     11-09    TPro  6.3  /  Alb  3.3  /  TBili  0.2  /  DBili  x   /  AST  14  /  ALT  24  /  AlkPhos  87  11-09    MICROBIOLOGY:    .Abscess R foot  11-03-22   Culture yields >4 types of aerobic and/or anaerobic bacteria  Call client services within 7 days if further workup is clinically  indicated.  --  --    .Blood Blood-Peripheral  11-03-22   No Growth Final  --  --    .Blood Blood-Peripheral  11-03-22   No Growth Final  --  --    (otherwise reviewed)    RADIOLOGY:    10/5 MR:      IMPRESSION:    Deep wound along the distal aspect of the second toe extending to bone.   Slight increased STIR signal and periosseous edema about the second toe   distal and middle phalanges with preserved T1 marrow signal. Differential   considerations include early osteomyelitis and reactive osteitis.    Status post amputation of the first toe to the level of the head of the   first proximal phalanx. There is subcortical edema and mildly decreased   T1 marrow signal at the distal amputation margin of the first proximal   phalanx with overlying soft tissue swelling. Differential considerations   include residual reactive edema related to amputation and osteomyelitis.   Continued close interval follow-up is recommended.

## 2022-11-09 NOTE — PROGRESS NOTE ADULT - ASSESSMENT
65 yo M with obesity, DM, CKD, s/p OLT, recent OM, presenting with R toe wound  No fever, no leukocytosis  Sent in for concern for OM of R second digit  Wound, s/p debridement bedside, culture pending  Prior cultures for recent OM with MSSA, GBS, bacteroides  Overall, OM, wound infection, CKD, S/p OLT  - Zosyn 3.375g q 8  - F/U pending wound culture--contam  - F/u Podiatry/Vasc; OR planning per team  - Care for transplant/transplant meds per team    Miguel Schulte MD  Contact on TEAMS messaging from 9am - 5pm  From 5pm-9am, on weekends, or if no response call [phone]

## 2022-11-09 NOTE — PHYSICAL THERAPY INITIAL EVALUATION ADULT - MD ORDER
PT eval and treat. AMbulate WBAT to R heel with surgical shoe. PT eval and treat. Ambulate WBAT to R heel with surgical shoe.

## 2022-11-09 NOTE — PROGRESS NOTE ADULT - SUBJECTIVE AND OBJECTIVE BOX
Chief Complaint:  Patient is a 66y old  Male who presents with a chief complaint of R toe wound (2022 07:56)       Interval Events:   Remains afebrile and HDS    Hospital Medications:  acetaminophen     Tablet .. 975 milliGRAM(s) Oral every 6 hours PRN  artificial  tears Solution 1 Drop(s) Both EYES every 6 hours PRN  chlorhexidine 2% Cloths 1 Application(s) Topical daily  Dakins Solution - 1/4 Strength 1 Application(s) Topical daily  dextrose 5%. 1000 milliLiter(s) IV Continuous <Continuous>  dextrose 5%. 1000 milliLiter(s) IV Continuous <Continuous>  dextrose 50% Injectable 25 Gram(s) IV Push once  dextrose 50% Injectable 12.5 Gram(s) IV Push once  dextrose 50% Injectable 25 Gram(s) IV Push once  dextrose Oral Gel 15 Gram(s) Oral once PRN  folic acid 1 milliGRAM(s) Oral every 24 hours  glucagon  Injectable 1 milliGRAM(s) IntraMuscular once  influenza  Vaccine (HIGH DOSE) 0.7 milliLiter(s) IntraMuscular once  insulin glargine Injectable (LANTUS) 5 Unit(s) SubCutaneous at bedtime  insulin lispro (ADMELOG) corrective regimen sliding scale   SubCutaneous every 6 hours  insulin lispro Injectable (ADMELOG) 8 Unit(s) SubCutaneous three times a day before meals  mycophenolate mofetil 500 milliGRAM(s) Oral two times a day  omega-3-Acid Ethyl Esters 2 Gram(s) Oral every 12 hours  pantoprazole    Tablet 40 milliGRAM(s) Oral before breakfast  piperacillin/tazobactam IVPB.. 3.375 Gram(s) IV Intermittent every 8 hours  polyethylene glycol 3350 17 Gram(s) Oral every 24 hours PRN  predniSONE   Tablet 20 milliGRAM(s) Oral daily  senna 2 Tablet(s) Oral at bedtime PRN  sertraline 100 milliGRAM(s) Oral every 24 hours  valGANciclovir 450 milliGRAM(s) Oral every 24 hours      ROS:   Complete and normal except as mentioned above.    PHYSICAL EXAM:   Vital Signs:  Vital Signs Last 24 Hrs  T(C): 36.3 (2022 13:00), Max: 36.5 (2022 01:00)  T(F): 97.4 (2022 13:00), Max: 97.7 (2022 01:00)  HR: 65 (2022 13:00) (61 - 77)  BP: 133/72 (2022 13:00) (110/54 - 152/70)  BP(mean): --  RR: 18 (2022 13:00) (18 - 18)  SpO2: 100% (2022 13:00) (95% - 100%)    Parameters below as of 2022 13:00  Patient On (Oxygen Delivery Method): room air      Daily     Daily Weight in k (2022 01:00)    GENERAL:  Well developed, no distress  HEENT:  NC/AT,  conjunctivae clear, sclera anicteric  CHEST:  Full & symmetric excursion, no increased effort w/ respirations  HEART:  Regular rhythm & rate  ABDOMEN:  Soft, non-tender, non-distended; +well healed chevron scar  EXTREMITIES:  +R second toe with dry gangrenous distal tip  SKIN:  No rash/erythema/ecchymoses/petechiae/wounds/jaundice  NEURO:  Alert, orientedx4    LABS: reviewed                        11.0   6.64  )-----------( 272      ( 2022 07:25 )             35.6     11-    138  |  106  |  32<H>  ----------------------------<  151<H>  4.4   |  23  |  1.91<H>    Ca    8.9      2022 06:34  Phos  3.8       Mg     2.0         TPro  6.3  /  Alb  3.3  /  TBili  0.2  /  DBili  x   /  AST  14  /  ALT  24  /  AlkPhos  87  11-09    LIVER FUNCTIONS - ( 2022 06:28 )  Alb: 3.3 g/dL / Pro: 6.3 g/dL / ALK PHOS: 87 U/L / ALT: 24 U/L / AST: 14 U/L / GGT: x             Interval Diagnostic Studies: see sunrise for full report

## 2022-11-09 NOTE — PROGRESS NOTE ADULT - ASSESSMENT
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 He had recent admission 8/30 to 9/5/2022 for R toe osteomyelitis, with R partial hallux amputation on 9/2/22. His cultures at that time were growing Bacteroides, GBS, Staph spp. He was discharged home with outpatient PT on 9/5/22. The patient presents to [facility] ED on 11/3/22 from home after being seen by his outpatient podiatrist who recommended amputation of second toe due to concern for worsening infection.     #Osteomyelitis R foot - Prior cultures growing: GBS, Bacteroides, MSSA  #Hx JEAN cirrhosis s/p OLT 7/2/22  #Hx CMV  #Hx HFpEF, HLD  #IDDM    Plan:  - pod consult - plan for partial toe amp today medically optimized from hepatology perspective to undergo partial amputation  - Continue Zosyn, Ertapenem (for Bacteroides coverage)   - Follow up blood cultures (-), wound cultures (>4 aerobic/anaerobic bacteria)  - Dankins dressing changes per Podiatry recs  - Consult vascular in if LETICIA/ PVR abnormal   - MRI foot done- suggestive osteo  - c/w Pred to 20  - c/w  bid   - Continue holding Everolimus (on hold from prior admission 9/2022)   - Prophylaxis: Valcyte, PPI    Preliminary note until signed by Attending.    Thank you for involving us in this patient's care.    Charlotte Alcocer MD  Gastroenterology/Hepatology Fellow, PGY-VI    NON-URGENT CONSULTS:  Please email [email or web address]  AT NIGHT AND ON WEEKENDS:  Contact on-call GI fellow via answering service ([phone]) from 5pm-8am and on weekends/holidays  MONDAY-FRIDAY 8AM-5PM:  Pager# [phone] ([facility])  GI Phone# [phone] ([facility])- Increased edema, last TTE from 12/2021- repeat TTE 11/4 w/o significant changes compared to prior TTE

## 2022-11-09 NOTE — PRE-OP CHECKLIST - ORDERS/MEDICATION ADMINISTRATION RECORD ON CHART
PROGRESS NOTES


Chief Complaint


Chief Complaint


Facial cellulitis








ASSESSMENT AND PLAN:


1.  Facial erythema:  appreciate Dr Sherman's input.  Abx incl merrem and doxy.  

Rheum consult


2.  DM2:  welll controlled on home regimen.  cont ISS


3.  HTN:  fair control on home metoprolol.  not on ACE-I as diabetic?


4.  Diabetic gastroparesis:  reglan PRN


5.  Hypokalemia:  worse.  replete PO and IV


6.  Prophylaxis:  loveox





Vitals


Vitals





 Vital Signs








  Date Time  Temp Pulse Resp B/P Pulse Ox O2 Delivery O2 Flow Rate FiO2


 


2/15/17 11:13 98.0 80 20 124/85 98 Room Air  





 98.0       











Physical Exam


General:  Alert, Oriented X3, Cooperative


Heart:  Regular rate


Lungs:  Clear


Abdomen:  Normal bowel sounds, Soft, No tenderness


Extremities:  No edema


Skin:  Other (erythema over malar pominences, nose and phyllum)





Labs


LABS





Laboratory Tests








Test


  2/14/17


19:00 2/14/17


21:02 2/15/17


04:16 2/15/17


07:40


 


White Blood Count


  11.4x10^3/uL


(4.0-11.0) 


  10.4x10^3/uL


(4.0-11.0) 


 


 


Red Blood Count


  5.28x10^6/uL


(3.50-5.40) 


  4.61x10^6/uL


(3.50-5.40) 


 


 


Hemoglobin


  14.3g/dL


(12.0-15.5) 


  12.7g/dL


(12.0-15.5) 


 


 


Hematocrit


  43.6%


(36.0-47.0) 


  38.1%


(36.0-47.0) 


 


 


Mean Corpuscular Volume 83fL ()   83fL ()  


 


Mean Corpuscular Hemoglobin 27pg (25-35)   28pg (25-35)  


 


Mean Corpuscular Hemoglobin


Concent 33g/dL (31-37) 


  


  33g/dL (31-37) 


  


 


 


Red Cell Distribution Width


  13.0%


(11.5-14.5) 


  13.3%


(11.5-14.5) 


 


 


Platelet Count


  389x10^3/uL


(140-400) 


  319x10^3/uL


(140-400) 


 


 


Neutrophils (%) (Auto) 69% (31-73)   60% (31-73)  


 


Lymphocytes (%) (Auto) 24% (24-48)   29% (24-48)  


 


Monocytes (%) (Auto) 6% (0-9)   8% (0-9)  


 


Eosinophils (%) (Auto) 2% (0-3)   2% (0-3)  


 


Basophils (%) (Auto) 0% (0-3)   1% (0-3)  


 


Neutrophils # (Auto)


  7.8x10^3uL


(1.8-7.7) 


  6.2x10^3uL


(1.8-7.7) 


 


 


Lymphocytes # (Auto)


  2.7x10^3/uL


(1.0-4.8) 


  3.0x10^3/uL


(1.0-4.8) 


 


 


Monocytes # (Auto)


  0.7x10^3/uL


(0.0-1.1) 


  0.8x10^3/uL


(0.0-1.1) 


 


 


Eosinophils # (Auto)


  0.2x10^3/uL


(0.0-0.7) 


  0.2x10^3/uL


(0.0-0.7) 


 


 


Basophils # (Auto)


  0.0x10^3/uL


(0.0-0.2) 


  0.1x10^3/uL


(0.0-0.2) 


 


 


Sodium Level


  137mmol/L


(136-145) 


  140mmol/L


(136-145) 


 


 


Potassium Level


  3.2mmol/L


(3.5-5.1) 


  3.0mmol/L


(3.5-5.1) 


 


 


Chloride Level


  97mmol/L


() 


  103mmol/L


() 


 


 


Carbon Dioxide Level


  26mmol/L


(21-32) 


  25mmol/L


(21-32) 


 


 


Anion Gap 14 (6-14)   12 (6-14)  


 


Blood Urea Nitrogen 12mg/dL (7-20)   9mg/dL (7-20)  


 


Creatinine


  0.8mg/dL


(0.6-1.0) 


  0.7mg/dL


(0.6-1.0) 


 


 


Estimated GFR


(Cockcroft-Gault) 80.3 


  


  93.6 


  


 


 


Glucose Level


  133mg/dL


(70-99) 


  109mg/dL


(70-99) 


 


 


Calcium Level


  9.2mg/dL


(8.5-10.1) 


  8.4mg/dL


(8.5-10.1) 


 


 


Glucose (Fingerstick)


  


  77mg/dL


(70-99) 


  99mg/dL


(70-99)














Test


  2/15/17


11:30 


  


  


 


 


Glucose (Fingerstick)


  167mg/dL


(70-99) 


  


  


 











Review of Systems


Review of Systems


erythema better acc. her. no fevers or chil





Comment


Review of Relevant


I have reviewed the following items elise (where applicable) has been applied.


Labs





Laboratory Tests








Test


  2/14/17


19:00 2/14/17


21:02 2/15/17


04:16 2/15/17


07:40


 


White Blood Count


  11.4x10^3/uL


(4.0-11.0) 


  10.4x10^3/uL


(4.0-11.0) 


 


 


Red Blood Count


  5.28x10^6/uL


(3.50-5.40) 


  4.61x10^6/uL


(3.50-5.40) 


 


 


Hemoglobin


  14.3g/dL


(12.0-15.5) 


  12.7g/dL


(12.0-15.5) 


 


 


Hematocrit


  43.6%


(36.0-47.0) 


  38.1%


(36.0-47.0) 


 


 


Mean Corpuscular Volume 83fL ()   83fL ()  


 


Mean Corpuscular Hemoglobin 27pg (25-35)   28pg (25-35)  


 


Mean Corpuscular Hemoglobin


Concent 33g/dL (31-37) 


  


  33g/dL (31-37) 


  


 


 


Red Cell Distribution Width


  13.0%


(11.5-14.5) 


  13.3%


(11.5-14.5) 


 


 


Platelet Count


  389x10^3/uL


(140-400) 


  319x10^3/uL


(140-400) 


 


 


Neutrophils (%) (Auto) 69% (31-73)   60% (31-73)  


 


Lymphocytes (%) (Auto) 24% (24-48)   29% (24-48)  


 


Monocytes (%) (Auto) 6% (0-9)   8% (0-9)  


 


Eosinophils (%) (Auto) 2% (0-3)   2% (0-3)  


 


Basophils (%) (Auto) 0% (0-3)   1% (0-3)  


 


Neutrophils # (Auto)


  7.8x10^3uL


(1.8-7.7) 


  6.2x10^3uL


(1.8-7.7) 


 


 


Lymphocytes # (Auto)


  2.7x10^3/uL


(1.0-4.8) 


  3.0x10^3/uL


(1.0-4.8) 


 


 


Monocytes # (Auto)


  0.7x10^3/uL


(0.0-1.1) 


  0.8x10^3/uL


(0.0-1.1) 


 


 


Eosinophils # (Auto)


  0.2x10^3/uL


(0.0-0.7) 


  0.2x10^3/uL


(0.0-0.7) 


 


 


Basophils # (Auto)


  0.0x10^3/uL


(0.0-0.2) 


  0.1x10^3/uL


(0.0-0.2) 


 


 


Sodium Level


  137mmol/L


(136-145) 


  140mmol/L


(136-145) 


 


 


Potassium Level


  3.2mmol/L


(3.5-5.1) 


  3.0mmol/L


(3.5-5.1) 


 


 


Chloride Level


  97mmol/L


() 


  103mmol/L


() 


 


 


Carbon Dioxide Level


  26mmol/L


(21-32) 


  25mmol/L


(21-32) 


 


 


Anion Gap 14 (6-14)   12 (6-14)  


 


Blood Urea Nitrogen 12mg/dL (7-20)   9mg/dL (7-20)  


 


Creatinine


  0.8mg/dL


(0.6-1.0) 


  0.7mg/dL


(0.6-1.0) 


 


 


Estimated GFR


(Cockcroft-Gault) 80.3 


  


  93.6 


  


 


 


Glucose Level


  133mg/dL


(70-99) 


  109mg/dL


(70-99) 


 


 


Calcium Level


  9.2mg/dL


(8.5-10.1) 


  8.4mg/dL


(8.5-10.1) 


 


 


Glucose (Fingerstick)


  


  77mg/dL


(70-99) 


  99mg/dL


(70-99)














Test


  2/15/17


11:30 


  


  


 


 


Glucose (Fingerstick)


  167mg/dL


(70-99) 


  


  


 








Laboratory Tests








Test


  2/14/17


19:00 2/14/17


21:02 2/15/17


04:16 2/15/17


07:40


 


White Blood Count


  11.4x10^3/uL


(4.0-11.0) 


  10.4x10^3/uL


(4.0-11.0) 


 


 


Red Blood Count


  5.28x10^6/uL


(3.50-5.40) 


  4.61x10^6/uL


(3.50-5.40) 


 


 


Hemoglobin


  14.3g/dL


(12.0-15.5) 


  12.7g/dL


(12.0-15.5) 


 


 


Hematocrit


  43.6%


(36.0-47.0) 


  38.1%


(36.0-47.0) 


 


 


Mean Corpuscular Volume 83fL ()   83fL ()  


 


Mean Corpuscular Hemoglobin 27pg (25-35)   28pg (25-35)  


 


Mean Corpuscular Hemoglobin


Concent 33g/dL (31-37) 


  


  33g/dL (31-37) 


  


 


 


Red Cell Distribution Width


  13.0%


(11.5-14.5) 


  13.3%


(11.5-14.5) 


 


 


Platelet Count


  389x10^3/uL


(140-400) 


  319x10^3/uL


(140-400) 


 


 


Neutrophils (%) (Auto) 69% (31-73)   60% (31-73)  


 


Lymphocytes (%) (Auto) 24% (24-48)   29% (24-48)  


 


Monocytes (%) (Auto) 6% (0-9)   8% (0-9)  


 


Eosinophils (%) (Auto) 2% (0-3)   2% (0-3)  


 


Basophils (%) (Auto) 0% (0-3)   1% (0-3)  


 


Neutrophils # (Auto)


  7.8x10^3uL


(1.8-7.7) 


  6.2x10^3uL


(1.8-7.7) 


 


 


Lymphocytes # (Auto)


  2.7x10^3/uL


(1.0-4.8) 


  3.0x10^3/uL


(1.0-4.8) 


 


 


Monocytes # (Auto)


  0.7x10^3/uL


(0.0-1.1) 


  0.8x10^3/uL


(0.0-1.1) 


 


 


Eosinophils # (Auto)


  0.2x10^3/uL


(0.0-0.7) 


  0.2x10^3/uL


(0.0-0.7) 


 


 


Basophils # (Auto)


  0.0x10^3/uL


(0.0-0.2) 


  0.1x10^3/uL


(0.0-0.2) 


 


 


Sodium Level


  137mmol/L


(136-145) 


  140mmol/L


(136-145) 


 


 


Potassium Level


  3.2mmol/L


(3.5-5.1) 


  3.0mmol/L


(3.5-5.1) 


 


 


Chloride Level


  97mmol/L


() 


  103mmol/L


() 


 


 


Carbon Dioxide Level


  26mmol/L


(21-32) 


  25mmol/L


(21-32) 


 


 


Anion Gap 14 (6-14)   12 (6-14)  


 


Blood Urea Nitrogen 12mg/dL (7-20)   9mg/dL (7-20)  


 


Creatinine


  0.8mg/dL


(0.6-1.0) 


  0.7mg/dL


(0.6-1.0) 


 


 


Estimated GFR


(Cockcroft-Gault) 80.3 


  


  93.6 


  


 


 


Glucose Level


  133mg/dL


(70-99) 


  109mg/dL


(70-99) 


 


 


Calcium Level


  9.2mg/dL


(8.5-10.1) 


  8.4mg/dL


(8.5-10.1) 


 


 


Glucose (Fingerstick)


  


  77mg/dL


(70-99) 


  99mg/dL


(70-99)














Test


  2/15/17


11:30 


  


  


 


 


Glucose (Fingerstick)


  167mg/dL


(70-99) 


  


  


 








Medications





 Current Medications


Vancomycin HCl 1.75 gm/Sodium Chloride 500 ml @  250 mls/hr 1X  ONCE IV  Last 

administered on 2/14/17at 19:12;  Start 2/14/17 at 19:00;  Stop 2/14/17 at 20:59

;  Status DC


Sodium Chloride (Iv Sodium Chloride 0.9% 1000ml Bag) 1,000 ml @  100 mls/hr 1X  

ONCE IV  Last administered on 2/14/17at 19:14;  Start 2/14/17 at 19:00;  Stop 2/

15/17 at 11:21;  Status DC


Ondansetron HCl 4 mg 4 mg PRN Q8HRS  PRN IV NAUSEA/VOMITING;  Start 2/14/17 at 

19:30;  Stop 2/15/17 at 10:59;  Status DC


Sodium Chloride (Iv Sodium Chloride 0.9% 1000ml Bag) 1,000 ml @  100 mls/hr 

Q10H IV  Last administered on 2/15/17at 05:35;  Start 2/14/17 at 19:29;  Stop 2/

15/17 at 11:21;  Status DC


Acetaminophen (Tylenol) 650 mg PRN Q4HRS  PRN PO FEVER Last administered on 2/15

/17at 05:34;  Start 2/14/17 at 19:30;  Stop 2/15/17 at 10:59;  Status DC


Acetaminophen (Tylenol) 650 mg PRN Q6HRS  PRN PO FEVER;  Start 2/14/17 at 19:45


Ondansetron HCl (Zofran) 4 mg PRN Q6HRS  PRN IV NAUSEA;  Start 2/14/17 at 19:45


Acetaminophen/ Hydrocodone Bitart (Lortab 5/325) 1 tab PRN Q6HRS  PRN PO PAIN 

MILD TO MOD;  Start 2/14/17 at 19:45


Insulin Aspart (Novolog) 0-9 UNITS TIDWMEALS SQ ;  Start 2/15/17 at 08:00


Dextrose 12.5 gm PRN Q15MIN  PRN IV SEE COMMENTS;  Start 2/14/17 at 19:45


Gabapentin (Neurontin) 300 mg HS PO  Last administered on 2/14/17at 21:37;  

Start 2/14/17 at 21:00


Metformin HCl (Glucophage) 1,000 mg BIDWMEALS PO  Last administered on 2/15/

17at 08:50;  Start 2/14/17 at 21:00;  Stop 2/15/17 at 11:00;  Status DC


Metoclopramide HCl (Reglan) 10 mg PRN TID  PRN PO INDIGESTION;  Start 2/14/17 

at 19:45


Metoprolol Tartrate (Lopressor) 50 mg DAILY PO  Last administered on 2/15/17at 

08:55;  Start 2/15/17 at 09:00;  Stop 2/15/17 at 11:01;  Status DC


Acarbose (Precose) 100 mg TIDWMEALS PO ;  Start 2/15/17 at 08:00;  Stop 2/15/17 

at 10:58;  Status DC


Acetaminophen/ Aspirin/Caffeine (Excedrin Migraine) 2 tab PRN BID  PRN PO PAIN;

  Start 2/14/17 at 19:45


Cetirizine HCl (Zyrtec) 10 mg PRN DAILY  PRN PO ALLERGIES Last administered on 2

/15/17at 02:18;  Start 2/15/17 at 02:15


Cyclobenzaprine HCl (Flexeril) 5 mg PRN TID  PRN PO MUSCLE PAIN;  Start 2/14/17 

at 20:00


Non-Formulary Medication 10 mg DAILY PO  Last administered on 2/15/17at 08:51;  

Start 2/15/17 at 09:00;  Stop 2/15/17 at 11:01;  Status DC


Glipizide (Glucotrol) 10 mg BIDBFRMEAL PO  Last administered on 2/15/17at 08:50

;  Start 2/15/17 at 07:30


Hydrochlorothiazide (Hydrodiuril) 25 mg DAILY PO  Last administered on 2/15/

17at 08:55;  Start 2/15/17 at 09:00;  Stop 2/15/17 at 11:00;  Status DC


Hydroxyzine Pamoate (Vistaril) 25 mg HS PO  Last administered on 2/14/17at 21:37

;  Start 2/14/17 at 20:00;  Stop 2/15/17 at 00:17;  Status DC


Non-Formulary Medication 1 each DAILY PO  Last administered on 2/15/17at 08:52;

  Start 2/15/17 at 09:00;  Stop 2/15/17 at 11:01;  Status DC


Non-Formulary Medication 10 mg PRN DAILY  PRN PO ALLERGIES;  Start 2/14/17 at 19

:45;  Status UNV


Insulin Detemir (Levemir) 30 units QHS SQ ;  Start 2/14/17 at 21:00;  Stop 2/15/

17 at 00:18;  Status DC


Vancomycin HCl 1 each 1 each PRN DAILY  PRN MC SEE COMMENTS Last administered 

on 2/14/17at 19:53;  Start 2/14/17 at 19:45;  Stop 2/15/17 at 10:12;  Status DC


Vancomycin HCl 1.5 gm/Sodium Chloride 500 ml @  250 mls/hr Q8H IV  Last 

administered on 2/15/17at 02:21;  Start 2/15/17 at 03:00;  Stop 2/15/17 at 10:12

;  Status DC


Meropenem/Sodium Chloride (Merrem/Iv Sodium Chloride 0.9% 50ml) 50 ml @  100 mls

/hr Q8HRS IV  Last administered on 2/15/17at 05:25;  Start 2/14/17 at 22:00


Potassium Chloride (Klor-Con) 40 meq 1X  ONCE PO  Last administered on 2/14/ 17at 21:37;  Start 2/14/17 at 20:00;  Stop 2/14/17 at 20:01;  Status DC


Vancomycin HCl 1 each 1X  ONCE MC ;  Start 2/15/17 at 18:30;  Stop 2/15/17 at 18

:30;  Status DC


Hydroxyzine Pamoate (Vistaril) 25 mg DAILYWSUP PO ;  Start 2/15/17 at 17:00


Insulin Detemir (Levemir) 30 units DAILY SQ ;  Start 2/15/17 at 09:00;  Stop 2/

15/17 at 10:58;  Status DC


Fluconazole (Diflucan) 400 mg DAILY PO  Last administered on 2/15/17at 11:11;  

Start 2/15/17 at 11:00


Insulin Detemir (Levemir) 40 units DAILY SQ ;  Start 2/15/17 at 10:58


Hydrochlorothiazide (Hydrodiuril) 25 mg DAILY@05 PO ;  Start 2/16/17 at 05:00


Metformin HCl (Glucophage) 1,000 mg BID@05,17 PO ;  Start 2/15/17 at 17:00


Metoprolol Tartrate (Lopressor) 50 mg DAILY@05 PO ;  Start 2/16/17 at 05:00


Non-Formulary Medication 10 mg DAILY@05 PO ;  Start 2/16/17 at 05:00


Non-Formulary Medication 1 each DAILY@05 PO ;  Start 2/16/17 at 05:00





Active Scripts


Active


Hydrocodone-Apap 5-325  ** (Hydrocodone Bit/Acetaminophen) 1 Each Tablet 1 Tab 

PO PRN Q6HRS PRN


Prednisone 50 Mg Tablet 1 Tab PO DAILY


Clindamycin Hcl 150 Mg Capsule 3 Cap PO TID 10 Days


Reported


Farxiga (Dapagliflozin Propanediol) 10 Mg Tablet 10 Mg PO DAILY


Doxycycline Hyclate 100 Mg Capsule 1 Cap PO BID


Hydroxyzine Hcl 25 Mg Tablet 1-2 Tab PO HS


Glipizide 10 Mg Tablet 1 Tab PO BID


Hydrochlorothiazide Tablet (Hydrochlorothiazide) 50 Mg Tablet 25 Mg PO DAILY


Metoprolol Tartrate 50 Mg Tablet 1 Tab PO DAILY


Gabapentin 300 Mg Capsule 300 Mg PO HS


Ibuprofen 400 Mg Tablet 1 Tab PO PRN Q6HRS PRN


Zyrtec (Cetirizine Hcl) 10 Mg Capsule 10 Mg PO PRN DAILY PRN


Claritin (Loratadine) 10 Mg Tablet 10 Mg PO PRN DAILY PRN


Jolessa (Levonorgestrel-Eth Estradiol) 1 Each Tbdspk.3mo 1 Each PO DAILY


Analgesic Tablet (Aspirin/Caffeine) 1 Each Tablet 2 Each PO PRN BID PRN


Reglan (Metoclopramide Hcl) 10 Mg Tablet 1 Tab PO TID PRN


Cyclobenzaprine Hcl 5 Mg Tablet 5 Mg PO TID PRN


Lantus Solostar (Insulin Glargine,Hum.rec.anlog) 100 Unit/1 Ml Insuln.pen 40 

Unit SQ DAILY


Precose (Acarbose) 100 Mg Tablet 100 Mg PO TID


Metformin Hcl 1,000 Mg Tablet 1 Tab PO BID


Vitals/I & O





 Vital Sign - Last 24 Hours








 2/14/17 2/14/17 2/14/17 2/14/17





 18:29 19:14 19:45 22:07


 


Temp    98.1





    98.1


 


Pulse 100   85


 


Resp 20   20


 


B/P 162/97 128/90 127/89 144/89


 


Pulse Ox 98   98


 


O2 Delivery Room Air   Room Air


 


    





    





 2/14/17 2/14/17 2/15/17 2/15/17





 22:46 22:56 03:07 07:15


 


Temp 98.1  97.9 97.9





 98.1  97.9 97.9


 


Pulse 85  91 80


 


Resp   18 20


 


B/P 144/89  139/79 133/88


 


Pulse Ox 98  97 97


 


O2 Delivery  Room Air Room Air Room Air


 


    





    





 2/15/17 2/15/17 2/15/17 





 07:30 08:55 11:13 


 


Temp   98.0 





   98.0 


 


Pulse  80 80 


 


Resp   20 


 


B/P  133/88 124/85 


 


Pulse Ox   98 


 


O2 Delivery Room Air  Room Air 














 Intake and Output   


 


 2/14/17 2/14/17 2/15/17





 14:59 22:59 06:59


 


Intake Total   875 ml


 


Balance   875 ml














STEPHANY LOUIE MD Feb 15, 2017 13:10 done

## 2022-11-09 NOTE — DISCHARGE NOTE PROVIDER - HOSPITAL COURSE
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 He had recent admission 8/30 to 9/5/2022 for R toe osteomyelitis, with R partial hallux amputation on 9/2/22. His cultures at that time were growing Bacteroides, GBS, Staph spp. He was discharged home with outpatient PT on 9/5/22. The patient presents to [facility] ED on 11/3/22 from home after being seen by his outpatient podiatrist who recommended amputation of second toe due to concern for worsening infection.     #Osteomyelitis R foot - Prior cultures growing: GBS, Bacteroides, MSSA now s/p partial toe amputation  #Hx JEAN cirrhosis s/p OLT 7/2/22  #Hx CMV  #Hx HFpEF, HLD  #IDDM    Plan:  - ID recommending augmentin 875 mg po q12, plan for  7 days treatment  - Plan to resume Everolimus after 2 weeks outpatient  - Plan for discharge home today  - dc Zosyn upon discharge  - Dankins dressing changes per Podiatry recs  - c/w Pred to 20  - c/w  bid   - Prophylaxis: Valcyte, PPI 65 y/o M with past medical history significant for obesity, IDDM, HFpEF (LVEF 65% 12/2021), CKD (Bl Cr ~1.6-1.8), 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 He had recent admission 8/30 to 9/5/2022 for R toe osteomyelitis, with R partial hallux amputation on 9/2/22. His cultures at that time were growing Bacteroides, GBS, Staph spp. He was discharged home with outpatient PT on 9/5/22. The patient presents to [facility] ED on 11/3/22 from home after being seen by his outpatient podiatrist who recommended amputation of second toe due to concern for worsening infection.     - R foot culture >4 organisms aerobic and anaerobic   - R foot MRI: R foot 2nd digit distal phalanx and middle phalanx early OM versus reactive osteitis   - LETICIA/PVR: non-compressible vessels bilateral with normal waveforms at the ankle    OR 11/9 s/p R foot partial second toe amputation  Continued on Zosyn with plan to change to Augmentin  x 7 days total from OR  11/9 Tissue culture without growth  Post-operative course uneventful  He was tolerating regular diet and had bowel function.  Worked with PT. No skilled needs identified  Immunosuppression was optimized: Everolimus held for additional 2 weeks post- OR.  MMF 500mg BID, Prednisone 20mg po qd     He was evaluated by the multi-disciplinary team including surgeon, nephrologist, ACP, pharmacist, nutrition, social work, and nursing and deemed stable for discharge with the following plan:       MEDS:  Everolimus held for additional 2 weeks post- OR.  MMF 500mg BID, Prednisone 20mg po qd  Augmentin 875mg po bid to go thru 11/16

## 2022-11-09 NOTE — DISCHARGE NOTE PROVIDER - NSDCFUSCHEDAPPT_GEN_ALL_CORE_FT
[facility]  Mirella [facility]  Scheduled Appointment: 11/18/2022    [facility]  Mirella VERAS [facility]  Scheduled Appointment: 12/02/2022

## 2022-11-09 NOTE — DISCHARGE NOTE PROVIDER - NSDCMRMEDTOKEN_GEN_ALL_CORE_FT
Admelog 100 units/mL injectable solution: 12 unit(s) subcutaneous 3 times a day (before meals) with sliding scale prn  folic acid 1 mg oral tablet: 1 tab(s) orally once a day  Lantus 100 units/mL subcutaneous solution: 16 unit(s) subcutaneous once a day (at bedtime)  mycophenolate mofetil 250 mg oral capsule: 2 cap(s) orally 2 times a day  omega-3 polyunsaturated fatty acids ethyl esters 1000 mg oral capsule: 1 cap(s) orally 2 times a day  pantoprazole 40 mg oral delayed release tablet: 1 tab(s) orally once a day (before a meal)  predniSONE 5 mg oral tablet: 2 tab(s) orally once a day  sertraline 100 mg oral tablet: 1 tab(s) orally once a day  valGANciclovir 450 mg oral tablet: 1 tab(s) orally once a day   Admelog 100 units/mL injectable solution: 12 unit(s) subcutaneous 3 times a day (before meals) with sliding scale prn  amoxicillin-clavulanate 875 mg-125 mg oral tablet: 1 tab(s) orally 2 times a day   folic acid 1 mg oral tablet: 1 tab(s) orally once a day  Lantus 100 units/mL subcutaneous solution: 16 unit(s) subcutaneous once a day (at bedtime)  mycophenolate mofetil 250 mg oral capsule: 2 cap(s) orally 2 times a day  omega-3 polyunsaturated fatty acids ethyl esters 1000 mg oral capsule: 1 cap(s) orally 2 times a day  pantoprazole 40 mg oral delayed release tablet: 1 tab(s) orally once a day (before a meal)  predniSONE 5 mg oral tablet: 2 tab(s) orally once a day  sertraline 100 mg oral tablet: 1 tab(s) orally once a day  valGANciclovir 450 mg oral tablet: 1 tab(s) orally once a day   Admelog 100 units/mL injectable solution: 2 unit(s) subcutaneous 3 times a day (before meals)  amoxicillin-clavulanate 875 mg-125 mg oral tablet: 1 tab(s) orally 2 times a day   everolimus 1 mg oral tablet: DO NOT TAKE UNTIL CLEARED BY YOUR HEPATOLOGIST or SURGEON  folic acid 1 mg oral tablet: 1 tab(s) orally once a day  Lantus 100 units/mL subcutaneous solution: 5 unit(s) subcutaneous once a day (at bedtime)  mycophenolate mofetil 250 mg oral capsule: 500  orally 2 times a day  omega-3 polyunsaturated fatty acids ethyl esters 1000 mg oral capsule: 1 cap(s) orally 2 times a day  pantoprazole 40 mg oral delayed release tablet: 1 tab(s) orally once a day (before a meal)  predniSONE 20 mg oral tablet: 1 tab(s) orally once a day  senna leaf extract oral tablet: 2 tab(s) orally once a day (at bedtime), As needed, Constipation  sertraline 100 mg oral tablet: 1 tab(s) orally every 24 hours

## 2022-11-10 NOTE — PROGRESS NOTE ADULT - ASSESSMENT
65 yo M with obesity, DM, CKD, s/p OLT, recent OM, presenting with R toe wound  No fever, no leukocytosis  Sent in for concern for OM of R second digit  Wound, s/p debridement bedside, culture pending  Prior cultures for recent OM with MSSA, GBS, bacteroides  S/p OR partial amp 11/9 low concern for residual infection, low concern for viability  Overall, OM, wound infection, CKD, S/p OLT  - Zosyn 3.375g q 8, when discharge planning Augmentin 875mg po q 12 x 5 days from OR (as long as podiatry has low concern for residual; or culture reassuring)  - Follow up OR culture/path  - F/u Podiatry/Vasc    Miguel Schulte MD  Contact on TEAMS messaging from 9am - 5pm  From 5pm-9am, on weekends, or if no response call [phone]

## 2022-11-10 NOTE — PROGRESS NOTE ADULT - SUBJECTIVE AND OBJECTIVE BOX
Chief Complaint:  Patient is a 66y old  Male who presents with a chief complaint of R toe wound (2022 19:55)      Interval Events:   Remains afebrile and HDS    Hospital Medications:  acetaminophen     Tablet .. 975 milliGRAM(s) Oral every 6 hours PRN  artificial  tears Solution 1 Drop(s) Both EYES every 6 hours PRN  dextrose 5%. 1000 milliLiter(s) IV Continuous <Continuous>  dextrose 5%. 1000 milliLiter(s) IV Continuous <Continuous>  dextrose 50% Injectable 25 Gram(s) IV Push once  dextrose 50% Injectable 12.5 Gram(s) IV Push once  dextrose 50% Injectable 25 Gram(s) IV Push once  dextrose Oral Gel 15 Gram(s) Oral once PRN  folic acid 1 milliGRAM(s) Oral every 24 hours  glucagon  Injectable 1 milliGRAM(s) IntraMuscular once  influenza  Vaccine (HIGH DOSE) 0.7 milliLiter(s) IntraMuscular once  insulin glargine Injectable (LANTUS) 5 Unit(s) SubCutaneous at bedtime  insulin lispro (ADMELOG) corrective regimen sliding scale   SubCutaneous three times a day before meals  insulin lispro (ADMELOG) corrective regimen sliding scale   SubCutaneous at bedtime  mycophenolate mofetil 500 milliGRAM(s) Oral two times a day  omega-3-Acid Ethyl Esters 2 Gram(s) Oral every 12 hours  pantoprazole    Tablet 40 milliGRAM(s) Oral before breakfast  polyethylene glycol 3350 17 Gram(s) Oral every 24 hours PRN  predniSONE   Tablet 20 milliGRAM(s) Oral daily  senna 2 Tablet(s) Oral at bedtime PRN  sertraline 100 milliGRAM(s) Oral every 24 hours      ROS:   Complete and normal except as mentioned above.      PHYSICAL EXAM:   Vital Signs:  Vital Signs Last 24 Hrs  T(C): 36.6 (10 Nov 2022 09:00), Max: 36.9 (2022 19:00)  T(F): 97.8 (10 Nov 2022 09:00), Max: 98.5 (2022 20:50)  HR: 65 (10 Nov 2022 09:00) (63 - 69)  BP: 155/76 (10 Nov 2022 09:00) (123/65 - 172/78)  BP(mean): 98 (2022 19:30) (95 - 116)  RR: 18 (10 Nov 2022 09:00) (16 - 20)  SpO2: 96% (10 Nov 2022 09:00) (96% - 100%)    Parameters below as of 10 Nov 2022 09:00  Patient On (Oxygen Delivery Method): room air      Daily     Daily Weight in k.8 (10 Nov 2022 05:00)    GENERAL:  Well developed, no distress  HEENT:  NC/AT,  conjunctivae clear, sclera anicteric  CHEST:  Full & symmetric excursion, no increased effort w/ respirations  HEART:  Regular rhythm & rate  ABDOMEN:  Soft, non-tender, non-distended; +well healed chevron scar  EXTREMITIES: r toe wrapped in dressing  SKIN:  No rash/erythema/ecchymoses/petechiae/wounds/jaundice  NEURO:  Alert, orientedx4    LABS: reviewed                        11.   7.  )-----------( 262      ( 10 Nov 2022 06:27 )             35.3     11-10    137  |  105  |  32<H>  ----------------------------<  193<H>  5.1   |  24  |  1.79<H>    Ca    8.9      10 Nov 2022 06:29  Phos  3.9     11-10  Mg     2.0     11-10    TPro  6.5  /  Alb  3.3  /  TBili  0.2  /  DBili  x   /  AST  12  /  ALT  23  /  AlkPhos  81  11-10    LIVER FUNCTIONS - ( 10 Nov 2022 06:27 )  Alb: 3.3 g/dL / Pro: 6.5 g/dL / ALK PHOS: 81 U/L / ALT: 23 U/L / AST: 12 U/L / GGT: x             Interval Diagnostic Studies: see sunrise for full report

## 2022-11-10 NOTE — PROGRESS NOTE ADULT - ASSESSMENT
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 He had recent admission 8/30 to 9/5/2022 for R toe osteomyelitis, with R partial hallux amputation on 9/2/22. His cultures at that time were growing Bacteroides, GBS, Staph spp. He was discharged home with outpatient PT on 9/5/22. The patient presents to [facility] ED on 11/3/22 from home after being seen by his outpatient podiatrist who recommended amputation of second toe due to concern for worsening infection.     #Osteomyelitis R foot - Prior cultures growing: GBS, Bacteroides, MSSA now s/p partial toe amputation  #Hx JEAN cirrhosis s/p OLT 7/2/22  #Hx CMV  #Hx HFpEF, HLD  #IDDM    Plan:  - f/u ID recs for plans for Abx  - Will consider restarting Everolimus tomorrow; will need to clarify whether was taking previously as it appears it was held since 9/2022  - If condition remains stable, reasonable to discharge home tomorrow   - Continue Zosyn, Ertapenem (for Bacteroides coverage)   - Follow up blood cultures (-), wound cultures (>4 aerobic/anaerobic bacteria)  - Dankins dressing changes per Podiatry recs  - Consult vascular in if LETICIA/ PVR abnormal   - MRI foot done- suggestive osteo  - c/w Pred to 20  - c/w  bid   - Prophylaxis: Valcyte, PPI    Preliminary note until signed by Attending.    Thank you for involving us in this patient's care.    Charlotte Alcocer MD  Gastroenterology/Hepatology Fellow, PGY-VI    NON-URGENT CONSULTS:  Please email [email or web address]  AT NIGHT AND ON WEEKENDS:  Contact on-call GI fellow via answering service ([phone]) from 5pm-8am and on weekends/holidays  MONDAY-FRIDAY 8AM-5PM:  Pager# [phone] ([facility])  GI Phone# [phone] ([facility])- Increased edema, last TTE from 12/2021- repeat TTE 11/4 w/o significant changes compared to prior TTE

## 2022-11-10 NOTE — PROGRESS NOTE ADULT - SUBJECTIVE AND OBJECTIVE BOX
Patient is a 66y old  Male who presents with a chief complaint of R toe wound (10 Nov 2022 11:45)       INTERVAL HPI/OVERNIGHT EVENTS:  Patient seen and evaluated at bedside.  Pt is resting comfortable in NAD. Denies N/V/F/C.  Pain rated at X/10    Allergies    codeine (Anaphylaxis)    Intolerances        Vital Signs Last 24 Hrs  T(C): 36.6 (10 Nov 2022 09:00), Max: 36.9 (09 Nov 2022 19:00)  T(F): 97.8 (10 Nov 2022 09:00), Max: 98.5 (09 Nov 2022 20:50)  HR: 65 (10 Nov 2022 09:00) (63 - 69)  BP: 155/76 (10 Nov 2022 09:00) (123/65 - 172/78)  BP(mean): 98 (09 Nov 2022 19:30) (95 - 116)  RR: 18 (10 Nov 2022 09:00) (16 - 20)  SpO2: 96% (10 Nov 2022 09:00) (96% - 100%)    Parameters below as of 10 Nov 2022 09:00  Patient On (Oxygen Delivery Method): room air        LABS:                        11.2   7.02  )-----------( 262      ( 10 Nov 2022 06:27 )             35.3     11-10    137  |  105  |  32<H>  ----------------------------<  193<H>  5.1   |  24  |  1.79<H>    Ca    8.9      10 Nov 2022 06:29  Phos  3.9     11-10  Mg     2.0     11-10    TPro  6.5  /  Alb  3.3  /  TBili  0.2  /  DBili  x   /  AST  12  /  ALT  23  /  AlkPhos  81  11-10    PT/INR - ( 10 Nov 2022 06:27 )   PT: 12.2 sec;   INR: 1.05 ratio         PTT - ( 10 Nov 2022 06:27 )  PTT:28.7 sec    CAPILLARY BLOOD GLUCOSE      POCT Blood Glucose.: 295 mg/dL (10 Nov 2022 12:00)  POCT Blood Glucose.: 215 mg/dL (10 Nov 2022 08:35)  POCT Blood Glucose.: 222 mg/dL (09 Nov 2022 22:49)  POCT Blood Glucose.: 149 mg/dL (09 Nov 2022 19:20)  POCT Blood Glucose.: 92 mg/dL (09 Nov 2022 18:30)  POCT Blood Glucose.: 131 mg/dL (09 Nov 2022 16:09)      Lower Extremity Physical Exam:  Vascular: DP2/4 B/L, PT 0/4 B/L, CFT <3 seconds to the R 3-5 and L 1-5, Temperature gradient warm to cool, B/L.   Neuro: Epicritic sensation diminished to the level of toes, B/L.  Musculoskeletal/Ortho: 9/2 s/p R foot partial hallux healed  Skin: s/p 11/9 right foot partial 2nd digit amputation: sutures intact, flaps wamr, no hematoma, skin well-coapted, no dehiscence. R partial hallux healed with dry stable eschar. L foot without signs of infection.     RADIOLOGY & ADDITIONAL TESTS:

## 2022-11-10 NOTE — PROGRESS NOTE ADULT - ASSESSMENT
66M s/p 11/9 right foot partial 2nd digit amputation  - Pt seen and evaluated  - Afebrile, no leukocytosis  - s/p 11/9 right foot partial 2nd digit amputation: sutures intact, flaps wamr, no hematoma, skin well-coapted, no dehiscence. R partial hallux healed with dry stable eschar. L foot without signs of infection.   - L foot xray: Possible cortical erosion of the tip of the second distal phalanx. Can obtain MR foot to further evaluate for osteomyelitis.   - R foot culture >4 organisms aerobic and anaerobic   - R foot MRI: R foot 2nd digit distal phalanx and middle phalanx early OM versus reactive osteitis   - LETICIA/PVR: non-compressible vessels bilateral with normal waveforms at the ankle  - Per intraop findings, low concern for viability and residual infection   - OR data pending   - Patient is stable for discharge on PO Augmentin x 7 days   - F/u information and instructions can be found in the f/u section of d/c provider note   - Seen with attending

## 2022-11-10 NOTE — PROGRESS NOTE ADULT - SUBJECTIVE AND OBJECTIVE BOX
CC: F/U for OM    Saw/spoke to patient. No fevers, no chills. No new complaints.    Allergies  codeine (Anaphylaxis)    ANTIMICROBIALS:      PE:    Vital Signs Last 24 Hrs  T(C): 36.8 (10 Nov 2022 12:53), Max: 36.9 (09 Nov 2022 19:00)  T(F): 98.2 (10 Nov 2022 12:53), Max: 98.5 (09 Nov 2022 20:50)  HR: 78 (10 Nov 2022 12:53) (63 - 78)  BP: 140/71 (10 Nov 2022 12:53) (123/65 - 172/78)  BP(mean): 98 (09 Nov 2022 19:30) (95 - 116)  RR: 18 (10 Nov 2022 12:53) (16 - 20)  SpO2: 99% (10 Nov 2022 12:53) (96% - 100%)    Gen: AOx3, NAD, non-toxic  CV: Nontachycardic  Resp: Breathing comfortably, RA  Abd: Soft, nontender  Ext: Bandaged wound    LABS:                        11.2   7.02  )-----------( 262      ( 10 Nov 2022 06:27 )             35.3     11-10    137  |  105  |  32<H>  ----------------------------<  193<H>  5.1   |  24  |  1.79<H>    Ca    8.9      10 Nov 2022 06:29  Phos  3.9     11-10  Mg     2.0     11-10    TPro  6.5  /  Alb  3.3  /  TBili  0.2  /  DBili  x   /  AST  12  /  ALT  23  /  AlkPhos  81  11-10    MICROBIOLOGY:    .Tissue Other  11-09-22 --  --    No polymorphonuclear cells seen per low power field  No organisms seen per oil power field    .Abscess R foot  11-03-22   Culture yields >4 types of aerobic and/or anaerobic bacteria  Call client services within 7 days if further workup is clinically  indicated.  --  --    .Blood Blood-Peripheral  11-03-22   No Growth Final  --  --    .Blood Blood-Peripheral  11-03-22   No Growth Final  --  --    CMVPCR Log: NotDetec [id number]/mL (11-09 @ 06:28)    (otherwise reviewed)    RADIOLOGY:    11/5 MR:    IMPRESSION:    Deep wound along the distal aspect of the second toe extending to bone.   Slight increased STIR signal and periosseous edema about the second toe   distal and middle phalanges with preserved T1 marrow signal. Differential   considerations include early osteomyelitis and reactive osteitis.    Status post amputation of the first toe to the level of the head of the   first proximal phalanx. There is subcortical edema and mildly decreased   T1 marrow signal at the distal amputation margin of the first proximal   phalanx with overlying soft tissue swelling. Differential considerations   include residual reactive edema related to amputation and osteomyelitis.   Continued close interval follow-up is recommended.

## 2022-11-11 NOTE — PROGRESS NOTE ADULT - SUBJECTIVE AND OBJECTIVE BOX
Chief Complaint:  Patient is a 66y old  Male who presents with a chief complaint of R toe wound (2022 08:25)         Interval Events:   Remains afebrile and HDS    Hospital Medications:  acetaminophen     Tablet .. 975 milliGRAM(s) Oral every 6 hours PRN  artificial  tears Solution 1 Drop(s) Both EYES every 6 hours PRN  dextrose 5%. 1000 milliLiter(s) IV Continuous <Continuous>  dextrose 5%. 1000 milliLiter(s) IV Continuous <Continuous>  dextrose 50% Injectable 25 Gram(s) IV Push once  dextrose 50% Injectable 12.5 Gram(s) IV Push once  dextrose 50% Injectable 25 Gram(s) IV Push once  dextrose Oral Gel 15 Gram(s) Oral once PRN  folic acid 1 milliGRAM(s) Oral every 24 hours  glucagon  Injectable 1 milliGRAM(s) IntraMuscular once  influenza  Vaccine (HIGH DOSE) 0.7 milliLiter(s) IntraMuscular once  insulin glargine Injectable (LANTUS) 5 Unit(s) SubCutaneous at bedtime  insulin lispro (ADMELOG) corrective regimen sliding scale   SubCutaneous three times a day before meals  insulin lispro (ADMELOG) corrective regimen sliding scale   SubCutaneous at bedtime  mycophenolate mofetil 500 milliGRAM(s) Oral two times a day  omega-3-Acid Ethyl Esters 2 Gram(s) Oral every 12 hours  pantoprazole    Tablet 40 milliGRAM(s) Oral before breakfast  piperacillin/tazobactam IVPB.. 3.375 Gram(s) IV Intermittent every 8 hours  polyethylene glycol 3350 17 Gram(s) Oral every 24 hours PRN  predniSONE   Tablet 20 milliGRAM(s) Oral daily  senna 2 Tablet(s) Oral at bedtime PRN  sertraline 100 milliGRAM(s) Oral every 24 hours      ROS:   Complete and normal except as mentioned above.    PHYSICAL EXAM:   Vital Signs:  Vital Signs Last 24 Hrs  T(C): 36.6 (2022 05:00), Max: 36.9 (10 Nov 2022 21:00)  T(F): 97.8 (2022 05:00), Max: 98.4 (10 Nov 2022 21:00)  HR: 72 (2022 05:00) (71 - 78)  BP: 162/76 (2022 05:00) (136/66 - 162/76)  BP(mean): --  RR: 18 (2022 05:00) (18 - 18)  SpO2: 97% (2022 05:00) (92% - 99%)    Parameters below as of 2022 05:00  Patient On (Oxygen Delivery Method): room air      Daily     Daily Weight in k.4 (2022 05:00)    GENERAL:  Well developed, no distress  HEENT:  NC/AT,  conjunctivae clear, sclera anicteric  CHEST:  Full & symmetric excursion, no increased effort w/ respirations  HEART:  Regular rhythm & rate  ABDOMEN:  Soft, non-tender, non-distended; +well healed chevron scar  EXTREMITIES: r toe wrapped in dressing  SKIN:  No rash/erythema/ecchymoses/petechiae/wounds/jaundice  NEURO:  Alert, orientedx4      LABS: reviewed                        .   6.66  )-----------( 249      ( 2022 06:40 )             35.6         139  |  105  |  35<H>  ----------------------------<  183<H>  4.4   |  24  |  1.81<H>    Ca    9.0      2022 06:31  Phos  4.0       Mg     2.0         TPro  6.4  /  Alb  3.5  /  TBili  0.2  /  DBili  x   /  AST  11  /  ALT  20  /  AlkPhos  98  11-11    LIVER FUNCTIONS - ( 2022 06:31 )  Alb: 3.5 g/dL / Pro: 6.4 g/dL / ALK PHOS: 98 U/L / ALT: 20 U/L / AST: 11 U/L / GGT: x             Interval Diagnostic Studies: see sunrise for full report

## 2022-11-11 NOTE — PROGRESS NOTE ADULT - ASSESSMENT
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 He had recent admission 8/30 to 9/5/2022 for R toe osteomyelitis, with R partial hallux amputation on 9/2/22. His cultures at that time were growing Bacteroides, GBS, Staph spp. He was discharged home with outpatient PT on 9/5/22. The patient presents to [facility] ED on 11/3/22 from home after being seen by his outpatient podiatrist who recommended amputation of second toe due to concern for worsening infection.     #Osteomyelitis R foot - Prior cultures growing: GBS, Bacteroides, MSSA now s/p partial toe amputation  #Hx JEAN cirrhosis s/p OLT 7/2/22  #Hx CMV  #Hx HFpEF, HLD  #IDDM    Plan:  - ID recommending 5 days augmentin 875 mg po q12 vs podiatry recommending 7 days  - Plan to resume Everolimus today  - Plan for discharge home today  - Continue Zosyn  - Dankins dressing changes per Podiatry recs  - c/w Pred to 20  - c/w  bid   - Prophylaxis: Valcyte, PPI    Preliminary note until signed by Attending.    Thank you for involving us in this patient's care.    Charlotte Alcocer MD  Gastroenterology/Hepatology Fellow, PGY-VI    NON-URGENT CONSULTS:  Please email [email or web address]  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 He had recent admission 8/30 to 9/5/2022 for R toe osteomyelitis, with R partial hallux amputation on 9/2/22. His cultures at that time were growing Bacteroides, GBS, Staph spp. He was discharged home with outpatient PT on 9/5/22. The patient presents to [facility] ED on 11/3/22 from home after being seen by his outpatient podiatrist who recommended amputation of second toe due to concern for worsening infection.     #Osteomyelitis R foot - Prior cultures growing: GBS, Bacteroides, MSSA now s/p partial toe amputation  #Hx JEAN cirrhosis s/p OLT 7/2/22  #Hx CMV  #Hx HFpEF, HLD  #IDDM    Plan:  - ID recommending augmentin 875 mg po q12, plan for  7 days treatment  - Plan to resume Everolimus after 2 weeks outpatient  - Plan for discharge home today  - dc Zosyn upon discharge  - Dankins dressing changes per Podiatry recs  - c/w Pred to 20  - c/w  bid   - Prophylaxis: Valcyte, PPI    Preliminary note until signed by Attending.    Thank you for involving us in this patient's care.    Charlotte Alcocer MD  Gastroenterology/Hepatology Fellow, PGY-VI    NON-URGENT CONSULTS:  Please email [email or web address]  AT NIGHT AND ON WEEKENDS:  Contact on-call GI fellow via answering service ([phone]) from 5pm-8am and on weekends/holidays  MONDAY-FRIDAY 8AM-5PM:  Pager# [phone] ([facility])  GI Phone# [phone] ([facility])- Increased edema, last TTE from 12/2021- repeat TTE 11/4 w/o significant changes compared to prior TTE

## 2022-11-11 NOTE — DISCHARGE NOTE NURSING/CASE MANAGEMENT/SOCIAL WORK - NSDCPEFALRISK_GEN_ALL_CORE
For information on Fall & Injury Prevention, visit: [email or web address] OR  [email or web address] OR  https://www.cdc.gov/steadi/patient.html

## 2022-11-11 NOTE — PROGRESS NOTE ADULT - SUBJECTIVE AND OBJECTIVE BOX
CC: F/U for OM    Saw/spoke to patient. No fevers, no chills. No new complaints.    Allergies  codeine (Anaphylaxis)    ANTIMICROBIALS:  piperacillin/tazobactam IVPB.. 3.375 every 8 hours    PE:    Vital Signs Last 24 Hrs  T(C): 36.5 (11 Nov 2022 12:48), Max: 36.9 (10 Nov 2022 21:00)  T(F): 97.7 (11 Nov 2022 12:48), Max: 98.4 (10 Nov 2022 21:00)  HR: 69 (11 Nov 2022 12:48) (69 - 75)  BP: 157/83 (11 Nov 2022 12:48) (136/66 - 162/76)  RR: 18 (11 Nov 2022 12:48) (18 - 18)  SpO2: 100% (11 Nov 2022 12:48) (92% - 100%)    Gen: AOx3, NAD, non-toxic  CV: Nontachycardic  Resp: Breathing comfortably, RA  Abd: Soft, nontender  IV/Skin: No thrombophlebitis    LABS:                        11.2   6.66  )-----------( 249      ( 11 Nov 2022 06:40 )             35.6     11-11    139  |  105  |  35<H>  ----------------------------<  183<H>  4.4   |  24  |  1.81<H>    Ca    9.0      11 Nov 2022 06:31  Phos  4.0     11-11  Mg     2.0     11-11    TPro  6.4  /  Alb  3.5  /  TBili  0.2  /  DBili  x   /  AST  11  /  ALT  20  /  AlkPhos  98  11-11    MICROBIOLOGY:    .Tissue Other  11-09-22   No growth  --    No polymorphonuclear cells seen per low power field  No organisms seen per oil power field    .Abscess R foot  11-03-22   Culture yields >4 types of aerobic and/or anaerobic bacteria  Call client services within 7 days if further workup is clinically  indicated.  --  --    .Blood Blood-Peripheral  11-03-22   No Growth Final  --  --    .Blood Blood-Peripheral  11-03-22   No Growth Final  --  --    CMVPCR Log: NotDetec [id number]/mL (11-09 @ 06:28)    (otherwise reviewed)    RADIOLOGY:    11/5 MR:    IMPRESSION:    Deep wound along the distal aspect of the second toe extending to bone.   Slight increased STIR signal and periosseous edema about the second toe   distal and middle phalanges with preserved T1 marrow signal. Differential   considerations include early osteomyelitis and reactive osteitis.    Status post amputation of the first toe to the level of the head of the   first proximal phalanx. There is subcortical edema and mildly decreased   T1 marrow signal at the distal amputation margin of the first proximal   phalanx with overlying soft tissue swelling. Differential considerations   include residual reactive edema related to amputation and osteomyelitis.   Continued close interval follow-up is recommended. Patient brought in by mother for mosquito bite to left eye since yesterday. No prior tx. Patient is acting self, denies any fevers. +swelling and redness noted around left eye.

## 2022-11-11 NOTE — DISCHARGE NOTE NURSING/CASE MANAGEMENT/SOCIAL WORK - NSDCFUADDAPPT_GEN_ALL_CORE_FT
Podiatry Discharge Instructions:  Follow up: Please follow up with Dr. Wang within 1 week of discharge from the hospital, please call [phone] for appointment and discuss that you recently were seen in the hospital.  Wound Care: Please leave your dressing clean dry intact until your follow up appointment.  Weight bearing: Please weight bear as tolerated on right heel in a surgical shoe.  Antibiotics: Please continue as instructed.    TRANSPLANT:  Follow-up in the Transplant clinic in 1-2 weeks with Dr. Medina  Phone: [phone]

## 2022-11-11 NOTE — DISCHARGE NOTE NURSING/CASE MANAGEMENT/SOCIAL WORK - PATIENT PORTAL LINK FT
You can access the FollowMyHealth Patient Portal offered by [facility] by registering at the following website: [email or web address]. By joining "[employer]’s FollowMyHealth portal, you will also be able to view your health information using other applications (apps) compatible with our system.

## 2022-11-11 NOTE — PROGRESS NOTE ADULT - ASSESSMENT
65 yo M with obesity, DM, CKD, s/p OLT, recent OM, presenting with R toe wound  No fever, no leukocytosis  Sent in for concern for OM of R second digit  Wound, s/p debridement bedside, culture pending  Prior cultures for recent OM with MSSA, GBS, bacteroides  S/p OR partial amp 11/9 low concern for residual infection, low concern for viability  Overall, OM, wound infection, CKD, S/p OLT  - Zosyn 3.375g q 8, when discharge planning can send out on Augmentin 875mg po q 12 x 5 days from OR (as long as podiatry has low concern for residual; or culture reassuring)  - Follow up OR culture/path  - F/u Podiatry/Vasc    Signing off. Please call with further questions or change in status.    Miguel Schulte MD  Contact on TEAMS messaging from 9am - 5pm  From 5pm-9am, on weekends, or if no response call [phone]

## 2022-11-11 NOTE — PROGRESS NOTE ADULT - ASSESSMENT
66M s/p 11/9 right foot partial 2nd digit amputation  - Pt seen and evaluated  - Afebrile, no leukocytosis  - s/p 11/9 right foot partial 2nd digit amputation: sutures intact, flaps wamr, no hematoma, skin well-coapted, no dehiscence. R partial hallux healed with dry stable eschar. L foot without signs of infection.   - L foot xray: Possible cortical erosion of the tip of the second distal phalanx. Can obtain MR foot to further evaluate for osteomyelitis.   - R foot culture >4 organisms aerobic and anaerobic   - R foot MRI: R foot 2nd digit distal phalanx and middle phalanx early OM versus reactive osteitis   - LETICIA/PVR: non-compressible vessels bilateral with normal waveforms at the ankle  - Per intraop findings, low concern for viability and residual infection   - OR data no growth prelim   - Patient is stable for discharge on PO Augmentin   - F/u information and instructions can be found in the f/u section of d/c provider note   - Discussed with attending

## 2022-11-11 NOTE — PROGRESS NOTE ADULT - SUBJECTIVE AND OBJECTIVE BOX
Patient is a 66y old  Male who presents with a chief complaint of R toe wound (10 Nov 2022 12:14)       INTERVAL HPI/OVERNIGHT EVENTS:  Patient seen and evaluated at bedside.  Pt is resting comfortable in NAD. Denies N/V/F/C.  Pain rated at X/10    Allergies    codeine (Anaphylaxis)    Intolerances        Vital Signs Last 24 Hrs  T(C): 36.6 (11 Nov 2022 05:00), Max: 36.9 (10 Nov 2022 21:00)  T(F): 97.8 (11 Nov 2022 05:00), Max: 98.4 (10 Nov 2022 21:00)  HR: 72 (11 Nov 2022 05:00) (65 - 78)  BP: 162/76 (11 Nov 2022 05:00) (136/66 - 162/76)  BP(mean): --  RR: 18 (11 Nov 2022 05:00) (18 - 18)  SpO2: 97% (11 Nov 2022 05:00) (92% - 99%)    Parameters below as of 11 Nov 2022 05:00  Patient On (Oxygen Delivery Method): room air        LABS:                        11.2   6.66  )-----------( 249      ( 11 Nov 2022 06:40 )             35.6     11-11    139  |  105  |  35<H>  ----------------------------<  183<H>  4.4   |  24  |  1.81<H>    Ca    9.0      11 Nov 2022 06:31  Phos  4.0     11-11  Mg     2.0     11-11    TPro  6.4  /  Alb  3.5  /  TBili  0.2  /  DBili  x   /  AST  11  /  ALT  20  /  AlkPhos  98  11-11    PT/INR - ( 11 Nov 2022 06:33 )   PT: 11.3 sec;   INR: 0.97 ratio         PTT - ( 11 Nov 2022 06:33 )  PTT:28.2 sec    CAPILLARY BLOOD GLUCOSE      POCT Blood Glucose.: 212 mg/dL (10 Nov 2022 21:12)  POCT Blood Glucose.: 231 mg/dL (10 Nov 2022 17:31)  POCT Blood Glucose.: 295 mg/dL (10 Nov 2022 12:00)  POCT Blood Glucose.: 215 mg/dL (10 Nov 2022 08:35)      Lower Extremity Physical Exam:  Vascular: DP2/4 B/L, PT 0/4 B/L, CFT <3 seconds to the R 3-5 and L 1-5, Temperature gradient warm to cool, B/L.   Neuro: Epicritic sensation diminished to the level of toes, B/L.  Musculoskeletal/Ortho: 9/2 s/p R foot partial hallux healed  Skin: s/p 11/9 right foot partial 2nd digit amputation: sutures intact, flaps warm, no hematoma, skin well-coapted, no dehiscence. R partial hallux healed with dry stable eschar. L foot without signs of infection.     RADIOLOGY & ADDITIONAL TESTS:

## 2022-11-11 NOTE — DISCHARGE NOTE NURSING/CASE MANAGEMENT/SOCIAL WORK - NSDCVIVACCINE_GEN_ALL_CORE_FT
influenza, injectable, quadrivalent, preservative free; 17-Dec-2019 10:15; Rajinder Rivera (RN); [employer]; [id number] (Exp. Date: 30-Jun-2020); IntraMuscular; Deltoid Right.; 0.5 milliLiter(s); VIS (VIS Published: 15-Aug-2019, VIS Presented: 17-Dec-2019);   Pneumococcal conjugate PCV 13; 16-Dec-2019 18:28; Jodie Serrano (RN); [facility]; [id number] (Exp. Date: 01-Jun-2021); IntraMuscular; Deltoid Right.; 0.5 milliLiter(s); VIS (VIS Published: 05-Nov-2015, VIS Presented: 16-Dec-2019);   pneumococcal polysaccharide PPV23; 18-Feb-2020 13:31; Carroll Guardado (RN); [employer] [id number] (Exp. Date: 18-May-2021); IntraMuscular; Deltoid Right.; 0.5 milliLiter(s); VIS (VIS Published: 06-Oct-2009, VIS Presented: 18-Feb-2020);

## 2022-11-11 NOTE — PROGRESS NOTE ADULT - PROVIDER SPECIALTY LIST ADULT
Infectious Disease
Transplant Hepatology
Transplant Hepatology
Infectious Disease
Podiatry
Podiatry
Transplant Hepatology
Infectious Disease
Podiatry
Transplant Hepatology
Podiatry
Podiatry
Transplant Hepatology

## 2022-11-11 NOTE — PROGRESS NOTE ADULT - REASON FOR ADMISSION
R toe wound

## 2022-11-16 NOTE — ED ADULT NURSE NOTE - NURSING ED EENT NOSE
[facility] - Pediatric Acute Care  Orthopedics  Progress Note    Patient Name: Ernestina Chan  MRN: [id number]  Admission Date: 11/14/2022  Hospital Length of Stay: 2 days  Attending Provider: Efren Kohli MD  Primary Care Provider: Primary Doctor No  Follow-up For: Procedure(s) (LRB):  ORIF, FRACTURE, FEMUR, LEFT (Left)    Post-Operative Day: 1 Day Post-Op  Subjective:     Principal Problem:Closed displaced oblique fracture of shaft of left femur    Principal Orthopedic Problem: same    Interval History: Pt seen and examined at bedside. NAEON, VSS, AF. Pain controlled. Denies fevers, chills, chest pain, SOB, N/V/D.   Worked well with PT. Plan for discharge home today.    Review of patient's allergies indicates:  No Known Allergies    Current Facility-Administered Medications   Medication    acetaminophen 32 mg/mL liquid (PEDS) 204.8 mg    hydrocodone-apap 7.5-325 MG/15 ML oral solution 5 mL    ibuprofen 100 mg/5 mL suspension 102 mg    morphine injection 1.02 mg     Objective:     Vital Signs (Most Recent):  Temp: 98.4 °F (36.9 °C) (11/16/22 0850)  Pulse: 95 (11/16/22 0850)  Resp: 20 (11/16/22 0850)  BP: (!) 101/78 (11/16/22 0850)  SpO2: 100 % (11/16/22 0850)   Vital Signs (24h Range):  Temp:  [97.2 °F (36.2 °C)-99.3 °F (37.4 °C)] 98.4 °F (36.9 °C)  Pulse:  [] 95  Resp:  [19-24] 20  SpO2:  [98 %-100 %] 100 %  BP: (100-116)/(55-78) 101/78     Weight: 20.4 kg (44 lb 15.6 oz)     There is no height or weight on file to calculate BMI.      Intake/Output Summary (Last 24 hours) at 11/16/2022 1039  Last data filed at 11/16/2022 0000  Gross per 24 hour   Intake 920.4 ml   Output --   Net 920.4 ml         Ortho/SPM Exam  Gen: NAD, sitting comfortably in bed  CV: regular rate  Resp: non-labored respirations    LLE:  - Dressings c/d/I with some peeling at superior incision  - KI in place  - TA/Gastroc/EHL//FHL assessed in isolation and are without deficit  - SILT throughout  - Compartments soft  - 2+ DP      Significant Labs: All pertinent labs within the past 24 hours have been reviewed.    Significant Imaging: I have reviewed and interpreted all pertinent imaging results/findings.    Assessment/Plan:     * Closed displaced oblique fracture of shaft of left femur  Ernestina Chan is a 5 y.o. male with a long oblique left femoral shaft fracture.  He is closed and neurovascularly intact.  He has no other injuries.  No concern for RAGINI.  He will require operative intervention for this injury.  Now s/p ORIF L femur 11/15/22.    Plan:  NWCRISTINA PERALTA  MM pain control  PT/OT  Drain: none  Hassan: none    Dispo: Home today              Wes Martínez MD  Orthopedics  [facility] - Pediatric Acute Care   normal

## 2022-11-17 NOTE — PROGRESS NOTE ADULT - PROBLEM SELECTOR PLAN 8
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  Macrocytosis and increased indirect component of hyperbilirubinemia consistent with hemolysis secondary to spur cell anemia.  B12, FA, and TSH all within normal limits.  No overt GI bleeding since admission and hemodynamically stable.  Transfusing 1 unit PRBCs 4/26 (to keep Hb >7).  daily CBC  Would benefit from eventual elective (non-emergent) repeat EGD to attempt repeat endoscopic therapy of GAVE and any AVMs to decrease chronic GI blood loss, but will defer for now given COVID-19 pandemic (which is limiting non-emergent procedures) as well as active infection and HE which increase his danilo-procedural risks. However, if overt bleeding develops or if not responding appropriate to PRBC transfusion, then would proceed with inpatient EGD.  f/up heme recs

## 2022-11-30 NOTE — PROGRESS NOTE ADULT - ASSESSMENT
ASSESSMENT:  64y Male s/p TUR of prostate abscess  POD#3    PLAN:   continue present management  bob care  follow labs  Continue Iv Ertapenem  irrigate bob PRN cough

## 2022-12-01 PROBLEM — D47.2 MGUS (MONOCLONAL GAMMOPATHY OF UNKNOWN SIGNIFICANCE): Status: ACTIVE | Noted: 2022-01-01

## 2022-12-01 PROBLEM — D64.9 ANEMIA: Status: ACTIVE | Noted: 2019-12-26

## 2022-12-01 NOTE — PHYSICAL EXAM
[Ambulatory and capable of all self care but unable to carry out any work activities] : Status 2- Ambulatory and capable of all self care but unable to carry out any work activities. Up and about more than 50% of waking hours [Normal] : affect appropriate [de-identified] : b/l LE edema 2+ [de-identified] : ecchymosis of b/l forearms

## 2022-12-01 NOTE — HISTORY OF PRESENT ILLNESS
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 Pt's Hb has been in the range of 8-9 since Jan 2021 on record. He has received multiple transfusions in the past. Taking iron supplement. Pt also has CKD4 following up with nephrology. Denies any blood in stool. Colonoscopy  a year a go was reportedly normal. Pt feels fatigue. Denies HA. CP, SOB, abd pain, constipation, diarrhea, melena, hematuria, dysuria.  [de-identified] : 4/25/22: Prashant is here for follow up accompanied by his son. He complains of feeling fatigue. Sleeps up to 12 hrs a day. B12, folate, iron WNL. Erythropoetin slightly elevated. Retic index 1.35 showed hypoproliferation likely due to everolimus and cellcept for liver transplant. Pt may benefit from epo injection. Will d/w Dr. Medina. Denies HA. CP, SOB, abd pain, constipation, diarrhea, melena, hematuria, dysuria. \par \par 8/3/22: Prashant is here for follow up accompanied by his son. He complains of feeling fatigue. pt had recent hospitalization due to fall, had left rib fx. Also passed a kidney stone. Cellcept dosage was lowered 2/2 BM suppression. He has been getting epogen injection every 2 weeks with improvement in Hb to 9-10 from 7-8. \par \par 12/1/22: pt had toe amputation x 2 in Sept and Oct for osteomyelitis. He has been getting epogen injection every 2 weeks for anemia with improvement in Hb to 10-11 from 7-8. Did not receive epogen for the past 6 weeks.

## 2022-12-01 NOTE — ASSESSMENT
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 Pt's Hb has been in the range of 8-9 since Jan 2021 on record. He has received multiple transfusions in the past. Taking iron supplement. Pt also has CKD4 following up with nephrology. Denies any blood in stool. Colonoscopy  a year a go was reportedly normal. Pt feels fatigue. Denies HA. CP, SOB, abd pain, constipation, diarrhea, melena, hematuria, dysuria. \par \par \par # anemia\par -multifactorial in the setting of CKD, BM supression, anticoagulation for DVT\par -colonoscopy 2018 showed hemorrhoids, gastritis\par -B12, folate, iron WNL. \par -Erythropoietin slightly elevated. Retic index 1.35 showed hypoproliferation likely due to cellcept and everolimus pt is taking for liver transplant\par -discussed with Dr. Medina (hepatology)> cellcept dosage lowered\par -On epogen injection. (72790 units subQ every 2 weeks) with improvement Hb to 9-10, Hold for Hg>10. \par -Pt has not needed epogen since cellcept dosage was lowered\par -continue iron supplement\par \par \par # MGUS\par -continue to monitor\par -will repeat immunoelectrophoresis next visit\par \par \par # DVT\par -provoked LE DVT  occurred after kidney transplant surger\par -on Eliquis \par -can come off AC, if not required by transplant team\par \par \par RTC in 3 months

## 2022-12-17 NOTE — PROGRESS NOTE ADULT - ASSESSMENT
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

## 2022-12-20 PROBLEM — Z98.890 STATUS POST FOOT SURGERY: Status: ACTIVE | Noted: 2022-01-01

## 2022-12-26 NOTE — ED PROVIDER NOTE - OBJECTIVE STATEMENT
67-year-old male with a history of hepatic encephalopathy, liver transplant, hypertension,  peripheral neuropathy with 2 recent amputations of his right first and second toe.  Presenting from podiatrist office with concern for osteomyelitis of the lateral portion of patient's right fifth phalange on his foot.  Patient notes this is couple weeks old, no fevers, no chills, no nausea, no vomiting.  No weakness or fatigue recently.  Patient was seeing podiatry for his first and second digits as a follow-up, and was found to have a large necrotic, wet gangrenous lesion on lateral portion of the right foot.

## 2022-12-26 NOTE — ED PROVIDER NOTE - ATTENDING CONTRIBUTION TO CARE
I, Madelin Soto, performed a history and physical exam of the patient and discussed their management with the resident and /or advanced care provider. I reviewed the resident and /or ACP's note and agree with the documented findings and plan of care. I was present and available for all procedures.     66-year-old male history of obesity, diabetes, heart failure, CKD, Arvizu cirrhosis status post transplant on immunosuppressants, VRE bacteremia, multiple right wound debridements presenting to the ED for evaluation of possible osteomyelitis of the right fifth metatarsal.  Patient reports that in September as well as October he had surgery amputation of his first and second toe on the right.  Has been following up with Dr. Benitez over the past 2 weeks due to nonhealing ulcer of the right lateral foot.  Was sent to the ED today for concern of osteomyelitis with plans for amputation.  Denies any fevers or chills, with neuropathies of bilateral lower extremities denies any pain in the foot from infection.  No nausea or vomiting.    Patient well-appearing in no acute distress resting comfortably in stretcher, regular rate and rhythm.  Lungs clear to auscultation bilaterally speaking in full sentences without increased work of breathing.  Saturating well on room air.  Abdomen is soft nontender no rebound or guarding.  Right lateral foot with large ulceration with necrotic center.  Surrounding erythema no purulent discharge.  Nontender with edema to mid calf.  And x-rays, antibiotic coverage with vancomycin and cefepime.  Labs including ESR CRP and blood cultures.  Podiatry consult and admission.

## 2022-12-26 NOTE — CONSULT NOTE ADULT - ASSESSMENT
67 M with right foot lateral 5th metatarsal wound to bone    - Pt seen and evaluated  - Afebrile, WBC 9.39,  ESR/CRP pending   -  right  foot xray (resident wet read) - cortical erosion noted to the lateral 5th met head (pending)  - Right foot lateral 5th metatarsal wound probing to bone with necrotic wound bed, slight malodor, scant serosanguinous drainage, wound bogginess, erythema expanding from lateral 5th met to digits 3-5, and global right foot non pitting edema. Left foot with no acute signs of infection.   - Aseptic right foot lateral 5th metatarsal wound debridement using sterile suture removal to the level of subQ and not beyond, no further tracking. Patient tolerated procedure well   - Right foot wound cultured and ordered  - Recommend admission to medicine  - Recommend IV vancomycin and cefepime   - Ordered LETICIA/PVR 2/2 to weak pulses  - Podiatry plan is right foot partial fifth ray amputation with right foot debridement and possible graft application  - Please document medical clearance for podiatry procedure under anesthesia   - Discussed with attending    67 M with right foot lateral 5th metatarsal wound to bone    - Pt seen and evaluated  - Afebrile, WBC 9.39,  ESR/CRP pending   -  right  foot xray (resident wet read) - cortical erosion noted to the lateral 5th met head (pending)  - Right foot lateral 5th metatarsal wound probing to bone with necrotic wound bed, slight malodor, scant serosanguinous drainage, wound bogginess, erythema expanding from lateral 5th met to digits 3-5, and global right foot non pitting edema. Left foot with no acute signs of infection.   - Aseptic right foot lateral 5th metatarsal wound debridement using sterile suture removal to the level of subQ and not beyond, no further tracking. Patient tolerated procedure well   - Right foot wound cultured and ordered  - Recommend admission to medicine  - Recommend IV vancomycin and cefepime   - Ordered LETICIA/PVR 2/2 to weak pulses  - Podiatry plan is right foot partial fifth ray amputation with right foot debridement and possible graft application (p) LETICIA/PVR  - Please document medical clearance for podiatry procedure under anesthesia   - Discussed with attending    67 M with right foot lateral 5th metatarsal wound to bone    - Pt seen and evaluated  - Afebrile, WBC 9.39,  ESR/CRP pending   -  right  foot xray (resident wet read) - cortical erosion noted to the lateral 5th met head (pending)  - Right foot lateral 5th metatarsal wound probing to bone with necrotic wound bed, slight malodor, scant serosanguinous drainage, wound bogginess, erythema expanding from lateral 5th met to digits 3-5, and global right foot non pitting edema. Left foot with no acute signs of infection.   - Aseptic right foot lateral 5th metatarsal wound debridement using sterile suture removal to the level of subQ and not beyond, no further tracking. Patient tolerated procedure well   - Right foot wound cultured and ordered  - Recommend admission to medicine  - Recommend IV vancomycin and cefepime   - Ordered LETICIA/PVR 2/2 to weak pulses  - Podiatry plan is right foot partial fifth ray amputation with right foot debridement and possible graft application   - Please document medical clearance for podiatry procedure under anesthesia   - Discussed with attending

## 2022-12-26 NOTE — ED PROVIDER NOTE - PHYSICAL EXAMINATION
CONSTITUTIONAL: well-appearing, in NAD  SKIN: Warm dry, normal skin turgor.  patient with right lateral wet gangrenous suppurative wound with exposed tendon, no bone palpated, this is overlying right 5th toe lateral aspect.  mild erythema noted.  HEAD: NCAT  EYES: no scleral icterus, conjunctiva pink  ENT: normal pharynx with no erythema or exudates  NECK: Supple; non tender. Full ROM.  CARD: RRR, no murmurs.  RESP: clear to ausculation b/l. No crackles or wheezing.  ABD: soft, non-tender, non-distended, no rebound or guarding.  MSK: no pedal edema, no calf tenderness  NEURO:   Sensation grossly intact on right foot.  PSYCH: Cooperative, appropriate.

## 2022-12-26 NOTE — ED PROVIDER NOTE - CLINICAL SUMMARY MEDICAL DECISION MAKING FREE TEXT BOX
Patient sent in from outside podiatrist in the setting of nonhealing, possibly infected right metatarsal fifth digit wound.  No systemic features at this time, but given history of liver transplant, and patient likely on immunosuppressants, will treat as systemic infxn.  Will get cultures of wound.  Will give fluid to patient.  We will get basic labs, type and screen, coagulations, and 2 blood cultures.  will provide podiatry consult, likely admit.  Given patient with history of kidney dysfunction, and consultation with podiatry, will start giving vancomycin and cefepime to cover for aerobic and anaerobic., strep, Staph, Pseudomonas, among others.

## 2022-12-26 NOTE — ED ADULT NURSE NOTE - CHIEF COMPLAINT
set-up required/nonverbal cues (demo/gestures)/1 person assist The patient is a 67y Male complaining of

## 2022-12-26 NOTE — ED ADULT NURSE NOTE - OBJECTIVE STATEMENT
67y male PMH HTN, DM with peripheral neuropathy, liver transplant s/p fatty liver with hepatic encenlopathy to the ED from home via triage c/o of osteomyelitis. Pt has been following with the podiatrist for a toe amputation pt had a few months ago and today the podiatrist noted that pt third toe on the right was infected and pt states "he saw some bone." Pt was sent in for admission. Pt denies any pain in the area and states 'I have neuropathy I cannot feel it." Pt denies chest pain, palpitations, shortness of breath, headache, visual disturbances, numbness/tingling, fever, chills, diaphoresis,  nausea, vomiting, constipation, diarrhea, or urinary symptoms.  Stretcher in lowest position and locked, appropriate side rails in place, room cleared of clutter and safety hazards, blankets given for comfort

## 2022-12-26 NOTE — ED PROVIDER NOTE - NS ED ROS FT
Cardiac: (-) chest pain (-) palpitations  Respiratory:  (-) cough   GI:  (-) nausea (-) vomiting (-) diarrhea   :  (-) dysuria   MS:  (-) back pain   Neuro:  (-) headache (-) numbness (-) tingling   Skin:  (-) rash  Except as documented in the HPI,  all other systems are negative

## 2022-12-26 NOTE — CONSULT NOTE ADULT - SUBJECTIVE AND OBJECTIVE BOX
Patient is a 67y old  Male who presents with a chief complaint of right foot lateral 5th metatarsal wound    HPI: 67 M with PMHx of DM and CKD reports that since his last discharge from hospital back in Nov, he has been following up outpatient with his podiatrist Dr. Wang. Last week on Tuesday 12/20 and again Friday 12/23 he saw his Dr Wang who advised him to come to ED due to worsening wound on thelatreal aspect of his right foot 5th metatarsal.  Patient also reports that he was prescribed Augmentin for 10 days. Last dose taken this morning. Patient denies any fevers, nausea, or chills.       PAST MEDICAL & SURGICAL HISTORY:  Diabetes      Hypercholesteremia      Neuropathy      Hypertension      Renal stones  25 years ago      Fatty liver disease, nonalcoholic      Obesity      Hepatic encephalopathy      GERD with esophagitis  Gastritis &amp; Non Bleeding Ulcers      GIB (gastrointestinal bleeding)      S/P cholecystectomy          MEDICATIONS  (STANDING):  vancomycin  IVPB. 1000 milliGRAM(s) IV Intermittent once    MEDICATIONS  (PRN):      Allergies    codeine (Anaphylaxis)    Intolerances        VITALS:    Vital Signs Last 24 Hrs  T(C): 36.6 (26 Dec 2022 18:10), Max: 36.7 (26 Dec 2022 15:38)  T(F): 97.9 (26 Dec 2022 18:10), Max: 98.1 (26 Dec 2022 15:38)  HR: 64 (26 Dec 2022 18:10) (62 - 68)  BP: 156/74 (26 Dec 2022 18:10) (124/61 - 156/74)  BP(mean): --  RR: 16 (26 Dec 2022 13:36) (16 - 16)  SpO2: 99% (26 Dec 2022 18:10) (99% - 99%)    Parameters below as of 26 Dec 2022 18:10  Patient On (Oxygen Delivery Method): room air        LABS:                          11.2   9.39  )-----------( 214      ( 26 Dec 2022 20:08 )             35.3       12-26    134<L>  |  106  |  40<H>  ----------------------------<  185<H>  5.1   |  17<L>  |  1.60<H>    Ca    9.1      26 Dec 2022 20:08  Phos  3.7     12-26  Mg     2.0     12-26    TPro  7.1  /  Alb  3.3  /  TBili  0.2  /  DBili  x   /  AST  18  /  ALT  30  /  AlkPhos  121<H>  12-26      CAPILLARY BLOOD GLUCOSE          PT/INR - ( 26 Dec 2022 20:08 )   PT: 12.2 sec;   INR: 1.06 ratio         PTT - ( 26 Dec 2022 20:08 )  PTT:27.3 sec    LOWER EXTREMITY PHYSICAL EXAM:    Vascular: DP/PT 1/4, B/L, CFT <3 seconds to R 3-5 toes and L 1-5, Temperature gradient warm to cool, B/L.   Neuro: Epicritic sensation diminished to the level of toes, B/L.  Musculoskeletal/Ortho: s/p  Right foot partial first and second partial amputation healed  Skin: Right foot lateral 5th metatarsal wound probing to bone with necrotic wound bed, slight malodor, scant serosanguinous drainage, wound bogginess, erythema expanding from lateral 5th met to digits 3-5,  and global right foot non pitting edema.. Left foot with no acute signs of infection.     RADIOLOGY & ADDITIONAL STUDIES:

## 2022-12-27 NOTE — H&P ADULT - ATTENDING COMMENTS
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 VSS, no objective signs of sepsis. Exam notable for 3+ pitting edema in b/l LEs (L>R, pt reports chronic) without appreciable crackles on lung auscultation. ESR and CRP elevated. VBG and serum CMP suggestive of metabolic acidosis. R foot XR (prelim) suggestive of OM.     #R foot 5th metatarsal OM  - f/u R foot XR final read  - Appreciate Podiatry recs, plan for R foot partial 5th ray amputation w/ R foot debridement and possible graft application. Preop evaluation as above.   - Prior to proceeding with surgery, would obtain LE duplex to r/o new DVT (has hx, not currently on AC) and also repeat blood gas for pt's metabolic acidosis given that it may change danilo-surgery management. 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 VSS, no objective signs of sepsis. Exam notable for 3+ pitting edema in b/l LEs (L>R, pt reports chronic) without appreciable crackles on lung auscultation. ESR and CRP elevated. VBG and serum CMP suggestive of metabolic acidosis. R foot XR (prelim) suggestive of OM.     #R foot 5th metatarsal OM  #b/l LE edema  #chronic diastolic CHF  #JEAN cirrhosis s/p Liver transplant recipient  #CKD3  #DM2    Plan:   - f/u R foot XR final read  - Appreciate Podiatry recs, plan for R foot partial 5th ray amputation w/ R foot debridement and possible graft application.   - Preop evaluation as above. Prior to proceeding with surgery, would obtain LE duplex to r/o new DVT (has hx of ?provoked DVT, not currently on AC) and also repeat blood gas for pt's metabolic acidosis given that it may change danilo-surgery management.   - c/w empiric vanc/zosyn  - f/u LETICIA/PVR, BCx, R foot wound Cx  - does not appear to be in CHF exacerbation at this time, though considering significant LE edema, can consider a dose of Lasix to optimize volume status  - c/w home transplant meds for now based on Surescripts, but need further med rec in AM  - Consider Transplant ID consult in AM  - Rest of care as per plan above

## 2022-12-27 NOTE — H&P ADULT - PROBLEM SELECTOR PLAN 6
Reported baseline 1.6-1.8  November labs during prior hospitalization mostly in line with that with some exceptions  sCr on admission 1.6  -CTM  -Avoid nephrotoxins as able (On vanc) Hx CNI tox, tolerating evolimus  Per EMR on Mycophenolate mofetil, prednisone, levofloxacin  -C/W Prednisone 10mg, clarify dosing with pt in AM  -C/W Mycophenolate mofetil 500mg BID, if becomes septic can DC, may also consider transplant ID consult  -Confirm if still taking levofloxacin (got 10 day supply in early december)  -C/W Everolimus 4mg BID  -C/W folic acid 1g/day  -Confirm home meds

## 2022-12-27 NOTE — ED ADULT NURSE REASSESSMENT NOTE - NS ED NURSE REASSESS COMMENT FT1
Pt reevaluated by RN. Pt not complaining of any pain at this time. Admitting MD at the bedside prior to reevaluation. VS updated see flowsheet. Stretcher in lowest position and locked, appropriate side rails in place, room cleared of clutter and safety hazards, blankets given for comfort

## 2022-12-27 NOTE — PROGRESS NOTE ADULT - SUBJECTIVE AND OBJECTIVE BOX
Patient is a 67y old  Male who presents with a chief complaint of C/F Foot osteomyelitis (27 Dec 2022 15:53)       INTERVAL HPI/OVERNIGHT EVENTS:  Patient seen and evaluated at bedside.  Pt is resting comfortable in NAD. Denies N/V/F/C.  Pain rated at X/10    Allergies    codeine (Anaphylaxis)    Intolerances        Vital Signs Last 24 Hrs  T(C): 36.3 (27 Dec 2022 10:33), Max: 36.7 (27 Dec 2022 05:01)  T(F): 97.4 (27 Dec 2022 10:33), Max: 98 (27 Dec 2022 05:01)  HR: 72 (27 Dec 2022 10:33) (64 - 72)  BP: 125/69 (27 Dec 2022 10:33) (125/69 - 131/74)  BP(mean): --  RR: 18 (27 Dec 2022 10:33) (18 - 18)  SpO2: 95% (27 Dec 2022 10:33) (95% - 98%)    Parameters below as of 27 Dec 2022 10:33  Patient On (Oxygen Delivery Method): room air        LABS:                        11.0   6.50  )-----------( 204      ( 27 Dec 2022 10:09 )             34.6     12-27    137  |  107  |  35<H>  ----------------------------<  163<H>  4.3   |  19<L>  |  1.56<H>    Ca    8.9      27 Dec 2022 10:09  Phos  3.0     12-27  Mg     2.0     12-27    TPro  6.9  /  Alb  3.2<L>  /  TBili  0.3  /  DBili  x   /  AST  15  /  ALT  22  /  AlkPhos  99  12-27    PT/INR - ( 26 Dec 2022 20:08 )   PT: 12.2 sec;   INR: 1.06 ratio         PTT - ( 26 Dec 2022 20:08 )  PTT:27.3 sec    CAPILLARY BLOOD GLUCOSE      POCT Blood Glucose.: 318 mg/dL (27 Dec 2022 18:16)  POCT Blood Glucose.: 225 mg/dL (27 Dec 2022 11:14)  POCT Blood Glucose.: 136 mg/dL (27 Dec 2022 08:48)      Lower Extremity Physical Exam:  Vascular: DP/PT 1/4, B/L, CFT <3 seconds to R 3-5 toes and L 1-5, Temperature gradient warm to cool, B/L.   < from: Xray Foot AP + Lateral + Oblique, Right (12.26.22 @ 20:34) >    ACC: [id number] EXAM:  XR FOOT COMP MIN 3 VIEWS RT                          PROCEDURE DATE:  12/26/2022          INTERPRETATION:  CLINICAL INDICATION: Concern for fifth digit   osteomyelitis.    TECHNIQUE:  X-ray 3 views of the right foot.    COMPARISON: Right foot x-ray 11/9/2022    FINDINGS:  Status post second toe amputation through mid proximal phalanx shaft with   sharp and smooth osseous stump margin. Status post partial hallux   amputation at the level of the distal first proximal phalanx.  Erosive changes at the distal aspect of the fifth right metatarsal and   the proximal aspect of the fifth proximal phalanx at the MTP joint, new   from 11/9/2022.  Diffuse vascular calcifications.    IMPRESSION:  Erosive changes at the distal aspectof the fifth right metatarsal and   the proximal aspect of the fifth proximal phalanx at the MTP joint, new   from 11/9/2022. Findings are consistent with osteomyelitis/septic   arthritis.    --- End of Report ---           ELIANA BRYAN MD; Resident Radiologist  This document has been electronically signed.  FANNY BERGERON MD; Attending Radiologist  This document has been electronically signed. Dec 27 2022  9:39AM    < end of copied text >  Neuro: Epicritic sensation diminished to the level of toes, B/L.  Musculoskeletal/Ortho: s/p  Right foot partial first and second partial amputation healed  Skin: Right foot lateral 5th metatarsal wound to bone, necrotic wound bed, no malodor, scant serosanguinous drainage, erythema improved to the lateral midfoot, edema improved. Left foot with no acute signs of infection.         RADIOLOGY & ADDITIONAL TESTS:

## 2022-12-27 NOTE — PROGRESS NOTE ADULT - PROBLEM SELECTOR PLAN 8
Reported baseline 1.6-1.8  November labs during prior hospitalization mostly in line with that with some exceptions  sCr on admission 1.6  -CTM  -Avoid nephrotoxins as able (On vanc) -C/W Home tamsulosin 0.4mg

## 2022-12-27 NOTE — PROGRESS NOTE ADULT - PROBLEM SELECTOR PLAN 6
Hx CNI tox, tolerating evolimus  Per EMR on Mycophenolate mofetil, prednisone, levofloxacin  -C/W Prednisone 10mg, clarify dosing with pt in AM  -C/W Mycophenolate mofetil 500mg BID, if becomes septic can DC, may also consider transplant ID consult  -Confirm if still taking levofloxacin (got 10 day supply in early december)  -C/W Everolimus 4mg BID  -C/W folic acid 1g/day  -Confirm home meds Biopsy Type: H and E A1C in november 6.7  Hx Diabetic foot ulcer, neuropathy, and CKD  notes occasional home glucose over 300 but usually low-mid 100s  At home takes Humalog 9U TID w/ meals and Lantus 14 units at night  -hold Humalog, can resume pending ISS  -Lantus 10U qHS for now  -ISS

## 2022-12-27 NOTE — CONSULT NOTE ADULT - SUBJECTIVE AND OBJECTIVE BOX
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 P/W 2 weeks of lateral right foot discomfort followed by outpt podiatrist. Was on creams and Augmentin without improvement told to come to ED. Now without pain. Denies fevers/chills/sob/abd pain/diarrhea/hematuria/dysuria/frequency. Notes constipation, chronic and varies with diet. Hx of diabetic foot ulcer at same location [id number]/2022. Of note pt hadblood glucose of 300 morning before presentation without polydipsia/polyuria/polyphagia/dehydration/syncope/presyncope. No known hx of PAD.    Pt independent in ADLS, walks with cane/walker, avoids stairs. Documentation reflects HFpEF, pt not aware of any cardiac dx, walks 3miles daily denies orthopnea, SOB, MOYER but notes LE edema. Hx nephrolithiasis as recently as september. CKD, per documentation has baseline cr 1.6-1.8, per pt has been told he may one day need a transplant.    Pt notes informed by pods likely needs large resection but prefers to do small resection accepting risk of worse outcomes.     PMHx: HTN, IDDM, CKD JEAN/Hepatic encephalopathy s/p transplant, Nephrolithiasis, HFpEF, VRE, CNI toxicity, PUD, Neuropathy, VRE, CMV, OM, Edema  PSHx: Liver xplant, cholecystectomy, elbow sx  Allergies: Codeine (throat swelling) and cats  Social: Denies MASOOD, was occasional drinker prior to xplant   (27 Dec 2022 02:23)       prior hospital charts reviewed [  ]  primary team notes reviewed [  ]  other consultant notes reviewed [  ]    PAST MEDICAL & SURGICAL HISTORY:  Diabetes      Hypercholesteremia      Neuropathy      Hypertension      Renal stones  25 years ago      Fatty liver disease, nonalcoholic      Obesity      Hepatic encephalopathy      GERD with esophagitis  Gastritis &amp; Non Bleeding Ulcers      GIB (gastrointestinal bleeding)      S/P cholecystectomy          Allergies  codeine (Anaphylaxis)    ANTIMICROBIALS (past 90 days)  MEDICATIONS  (STANDING):  cefepime   IVPB   100 mL/Hr IV Intermittent (12-26-22 @ 20:04)    cefepime   IVPB   100 mL/Hr IV Intermittent (12-27-22 @ 09:00)    vancomycin  IVPB.   250 mL/Hr IV Intermittent (12-26-22 @ 21:58)        cefepime   IVPB 2000 every 12 hours    MEDICATIONS  (STANDING):  dextrose 50% Injectable 25 once  dextrose 50% Injectable 12.5 once  dextrose 50% Injectable 25 once  dextrose Oral Gel 15 once PRN  everolimus (ZORTRESS) 4 two times a day  glucagon  Injectable 1 once  insulin glargine Injectable (LANTUS) 10 at bedtime  insulin lispro (ADMELOG) corrective regimen sliding scale  three times a day before meals  pantoprazole    Tablet 40 before breakfast  polyethylene glycol 3350 17 daily PRN  predniSONE   Tablet 10 daily  senna 2 at bedtime  sertraline 100 daily  tamsulosin 0.4 at bedtime    SOCIAL HISTORY:       FAMILY HISTORY:  Family history of stomach cancer    Family history of hypertension    Family history of type 2 diabetes mellitus      REVIEW OF SYSTEMS  [  ] ROS unobtainable because:    [  ] All other systems negative except as noted below:	    Constitutional:  [ ] fever [ ] chills  [ ] weight loss  [ ] weakness  Skin:  [ ] rash [ ] phlebitis	  Eyes: [ ] icterus [ ] pain  [ ] discharge	  ENMT: [ ] sore throat  [ ] thrush [ ] ulcers [ ] exudates  Respiratory: [ ] dyspnea [ ] hemoptysis [ ] cough [ ] sputum	  Cardiovascular:  [ ] chest pain [ ] palpitations [ ] edema	  Gastrointestinal:  [ ] nausea [ ] vomiting [ ] diarrhea [ ] constipation [ ] pain	  Genitourinary:  [ ] dysuria [ ] frequency [ ] hematuria [ ] discharge [ ] flank pain  [ ] incontinence  Musculoskeletal:  [ ] myalgias [ ] arthralgias [ ] arthritis  [ ] back pain  Neurological:  [ ] headache [ ] seizures  [ ] confusion/altered mental status  Psychiatric:  [ ] anxiety [ ] depression	  Hematology/Lymphatics:  [ ] lymphadenopathy  Endocrine:  [ ] adrenal [ ] thyroid  Allergic/Immunologic:	 [ ] transplant [ ] seasonal    Vital Signs Last 24 Hrs  T(F): 97.4 (12-27-22 @ 10:33), Max: 98.1 (12-26-22 @ 15:38)  Vital Signs Last 24 Hrs  HR: 72 (12-27-22 @ 10:33) (62 - 72)  BP: 125/69 (12-27-22 @ 10:33) (124/61 - 156/74)  RR: 18 (12-27-22 @ 10:33)  SpO2: 95% (12-27-22 @ 10:33) (95% - 99%)  Wt(kg): --    PHYSICAL EXAM:  Constitutional: non-toxic, no distress  HEAD/EYES: anicteric, no conjunctival injection  ENT:  supple, no thrush  Cardiovascular:   normal S1, S2, no murmur, no edema  Respiratory:  clear BS bilaterally, no wheezes, no rales  GI:  soft, non-tender, normal bowel sounds  :  no bob, no CVA tenderness  Musculoskeletal:  no synovitis, normal ROM  Neurologic: awake and alert, normal strength, no focal findings  Skin:  no rash, no erythema, no phlebitis  Heme/Onc: no lymphadenopathy   Psychiatric:  awake, alert, appropriate mood                            11.0   6.50  )-----------( 204      ( 27 Dec 2022 10:09 )             34.6   12-27    137  |  107  |  35<H>  ----------------------------<  163<H>  4.3   |  19<L>  |  1.56<H>    Ca    8.9      27 Dec 2022 10:09  Phos  3.0     12-27  Mg     2.0     12-27    TPro  6.9  /  Alb  3.2<L>  /  TBili  0.3  /  DBili  x   /  AST  15  /  ALT  22  /  AlkPhos  99  12-27    MICROBIOLOGY:        RADIOLOGY:  imaging below personally reviewed and agree with findings    < from: Xray Foot AP + Lateral + Oblique, Right (12.26.22 @ 20:34) >  IMPRESSION:  Erosive changes at the distal aspectof the fifth right metatarsal and   the proximal aspect of the fifth proximal phalanx at the MTP joint, new   from 11/9/2022. Findings are consistent with osteomyelitis/septic   arthritis.      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 P/W 2 weeks of lateral right foot discomfort followed by outpt podiatrist. Was on creams and Augmentin without improvement told to come to ED. Now without pain. Denies fevers/chills/sob/abd pain/diarrhea/hematuria/dysuria/frequency. Notes constipation, chronic and varies with diet. Hx of diabetic foot ulcer at same location [id number]/2022. Of note pt hadblood glucose of 300 morning before presentation without polydipsia/polyuria/polyphagia/dehydration/syncope/presyncope. No known hx of PAD.    Pt independent in ADLS, walks with cane/walker, avoids stairs. Documentation reflects HFpEF, pt not aware of any cardiac dx, walks 3miles daily denies orthopnea, SOB, MOYER but notes LE edema. Hx nephrolithiasis as recently as september. CKD, per documentation has baseline cr 1.6-1.8, per pt has been told he may one day need a transplant.    Pt notes informed by pods likely needs large resection but prefers to do small resection accepting risk of worse outcomes.     PMHx: HTN, IDDM, CKD JEAN/Hepatic encephalopathy s/p transplant, Nephrolithiasis, HFpEF, VRE, CNI toxicity, PUD, Neuropathy, VRE, CMV, OM, Edema  PSHx: Liver xplant, cholecystectomy, elbow sx  Allergies: Codeine (throat swelling) and cats  Social: Denies MASOOD, was occasional drinker prior to xplant   (27 Dec 2022 02:23)       prior hospital charts reviewed [  ]  primary team notes reviewed [ X ]  other consultant notes reviewed [ X ]    PAST MEDICAL & SURGICAL HISTORY:  Diabetes      Hypercholesteremia      Neuropathy      Hypertension      Renal stones  25 years ago      Fatty liver disease, nonalcoholic      Obesity      Hepatic encephalopathy      GERD with esophagitis  Gastritis &amp; Non Bleeding Ulcers      GIB (gastrointestinal bleeding)      S/P cholecystectomy          Allergies  codeine (Anaphylaxis)    ANTIMICROBIALS (past 90 days)  MEDICATIONS  (STANDING):  cefepime   IVPB   100 mL/Hr IV Intermittent (12-26-22 @ 20:04)    cefepime   IVPB   100 mL/Hr IV Intermittent (12-27-22 @ 09:00)    vancomycin  IVPB.   250 mL/Hr IV Intermittent (12-26-22 @ 21:58)        cefepime   IVPB 2000 every 12 hours    MEDICATIONS  (STANDING):  dextrose 50% Injectable 25 once  dextrose 50% Injectable 12.5 once  dextrose 50% Injectable 25 once  dextrose Oral Gel 15 once PRN  everolimus (ZORTRESS) 4 two times a day  glucagon  Injectable 1 once  insulin glargine Injectable (LANTUS) 10 at bedtime  insulin lispro (ADMELOG) corrective regimen sliding scale  three times a day before meals  pantoprazole    Tablet 40 before breakfast  polyethylene glycol 3350 17 daily PRN  predniSONE   Tablet 10 daily  senna 2 at bedtime  sertraline 100 daily  tamsulosin 0.4 at bedtime    SOCIAL HISTORY:     Denies tobacco and alcohol use    FAMILY HISTORY:  Family history of stomach cancer    Family history of hypertension    Family history of type 2 diabetes mellitus      REVIEW OF SYSTEMS  [  ] ROS unobtainable because:    [ X ] All other systems negative except as noted below:	    Constitutional:  [ ] fever [ ] chills  [ ] weight loss  [ ] weakness  Skin:  [ ] rash [ ] phlebitis [X] right foot wound  Eyes: [ ] icterus [ ] pain  [ ] discharge	  ENMT: [ ] sore throat  [ ] thrush [ ] ulcers [ ] exudates  Respiratory: [ ] dyspnea [ ] hemoptysis [ ] cough [ ] sputum	  Cardiovascular:  [ ] chest pain [ ] palpitations [ ] edema	  Gastrointestinal:  [ ] nausea [ ] vomiting [ ] diarrhea [ ] constipation [ ] pain	  Genitourinary:  [ ] dysuria [ ] frequency [ ] hematuria [ ] discharge [ ] flank pain  [ ] incontinence  Musculoskeletal:  [ ] myalgias [ ] arthralgias [ ] arthritis  [ ] back pain  Neurological:  [ ] headache [ ] seizures  [ ] confusion/altered mental status  Psychiatric:  [ ] anxiety [ ] depression	  Hematology/Lymphatics:  [ ] lymphadenopathy  Endocrine:  [ ] adrenal [ ] thyroid  Allergic/Immunologic:	 [ ] transplant [ ] seasonal    Vital Signs Last 24 Hrs  T(F): 97.4 (12-27-22 @ 10:33), Max: 98.1 (12-26-22 @ 15:38)  Vital Signs Last 24 Hrs  HR: 72 (12-27-22 @ 10:33) (62 - 72)  BP: 125/69 (12-27-22 @ 10:33) (124/61 - 156/74)  RR: 18 (12-27-22 @ 10:33)  SpO2: 95% (12-27-22 @ 10:33) (95% - 99%)  Wt(kg): --    PHYSICAL EXAM:  Constitutional: non-toxic, no distress  HEAD/EYES: anicteric, no conjunctival injection  ENT:  supple, no thrush  Cardiovascular:   normal S1, S2, no murmur, no edema  Respiratory:  clear BS bilaterally, no wheezes, no rales  GI:  soft, non-tender, normal bowel sounds  :  no bob, no CVA tenderness  Musculoskeletal:  no synovitis, normal ROM  Neurologic: awake and alert, normal strength, no focal findings  Skin: Large right 5th metatarsal wound with necrotic wound bed; surrounding erythema and warmth; ascending edema up to mid-shin  Heme/Onc: no lymphadenopathy   Psychiatric:  awake, alert, appropriate mood                            11.0   6.50  )-----------( 204      ( 27 Dec 2022 10:09 )             34.6   12-27    137  |  107  |  35<H>  ----------------------------<  163<H>  4.3   |  19<L>  |  1.56<H>    Ca    8.9      27 Dec 2022 10:09  Phos  3.0     12-27  Mg     2.0     12-27    TPro  6.9  /  Alb  3.2<L>  /  TBili  0.3  /  DBili  x   /  AST  15  /  ALT  22  /  AlkPhos  99  12-27    MICROBIOLOGY:        RADIOLOGY:  imaging below personally reviewed and agree with findings    < from: Xray Foot AP + Lateral + Oblique, Right (12.26.22 @ 20:34) >  IMPRESSION:  Erosive changes at the distal aspectof the fifth right metatarsal and   the proximal aspect of the fifth proximal phalanx at the MTP joint, new   from 11/9/2022. Findings are consistent with osteomyelitis/septic   arthritis.      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 P/W 2 weeks of lateral right foot discomfort followed by outpt podiatrist. Was on creams and Augmentin without improvement told to come to ED. Now without pain. Denies fevers/chills/sob/abd pain/diarrhea/hematuria/dysuria/frequency. Notes constipation, chronic and varies with diet. Hx of diabetic foot ulcer at same location [id number]. Of note pt hadblood glucose of 300 morning before presentation without polydipsia/polyuria/polyphagia/dehydration/syncope/presyncope. No known hx of PAD.    Pt independent in ADLS, walks with cane/walker, avoids stairs. Documentation reflects HFpEF, pt not aware of any cardiac dx, walks 3miles daily denies orthopnea, SOB, MOYER but notes LE edema. Hx nephrolithiasis as recently as september. CKD, per documentation has baseline cr 1.6-1.8, per pt has been told he may one day need a transplant.    Pt notes informed by pods likely needs large resection but prefers to do small resection accepting risk of worse outcomes.     PMHx: HTN, IDDM, CKD JEAN/Hepatic encephalopathy s/p transplant, Nephrolithiasis, HFpEF, VRE, CNI toxicity, PUD, Neuropathy, VRE, CMV, OM, Edema  PSHx: Liver xplant, cholecystectomy, elbow sx  Allergies: Codeine (throat swelling) and cats  Social: Denies MASOOD, was occasional drinker prior to xplant   (27 Dec 2022 02:23)       prior hospital charts reviewed [  ]  primary team notes reviewed [ X ]  other consultant notes reviewed [ X ]    PAST MEDICAL & SURGICAL HISTORY:  Diabetes      Hypercholesteremia      Neuropathy      Hypertension      Renal stones  25 years ago      Fatty liver disease, nonalcoholic      Obesity      Hepatic encephalopathy      GERD with esophagitis  Gastritis &amp; Non Bleeding Ulcers      GIB (gastrointestinal bleeding)      S/P cholecystectomy          Allergies  codeine (Anaphylaxis)    ANTIMICROBIALS (past 90 days)  MEDICATIONS  (STANDING):  cefepime   IVPB   100 mL/Hr IV Intermittent (12-26-22 @ 20:04)    cefepime   IVPB   100 mL/Hr IV Intermittent (12-27-22 @ 09:00)    vancomycin  IVPB.   250 mL/Hr IV Intermittent (12-26-22 @ 21:58)        cefepime   IVPB 2000 every 12 hours    MEDICATIONS  (STANDING):  dextrose 50% Injectable 25 once  dextrose 50% Injectable 12.5 once  dextrose 50% Injectable 25 once  dextrose Oral Gel 15 once PRN  everolimus (ZORTRESS) 4 two times a day  glucagon  Injectable 1 once  insulin glargine Injectable (LANTUS) 10 at bedtime  insulin lispro (ADMELOG) corrective regimen sliding scale  three times a day before meals  pantoprazole    Tablet 40 before breakfast  polyethylene glycol 3350 17 daily PRN  predniSONE   Tablet 10 daily  senna 2 at bedtime  sertraline 100 daily  tamsulosin 0.4 at bedtime    SOCIAL HISTORY:     Denies tobacco and alcohol use    FAMILY HISTORY:  Family history of stomach cancer    Family history of hypertension    Family history of type 2 diabetes mellitus      REVIEW OF SYSTEMS  [  ] ROS unobtainable because:    [ X ] All other systems negative except as noted below:	    Constitutional:  [ ] fever [ ] chills  [ ] weight loss  [ ] weakness  Skin:  [ ] rash [ ] phlebitis [X] right foot wound  Eyes: [ ] icterus [ ] pain  [ ] discharge	  ENMT: [ ] sore throat  [ ] thrush [ ] ulcers [ ] exudates  Respiratory: [ ] dyspnea [ ] hemoptysis [ ] cough [ ] sputum	  Cardiovascular:  [ ] chest pain [ ] palpitations [ ] edema	  Gastrointestinal:  [ ] nausea [ ] vomiting [ ] diarrhea [ ] constipation [ ] pain	  Genitourinary:  [ ] dysuria [ ] frequency [ ] hematuria [ ] discharge [ ] flank pain  [ ] incontinence  Musculoskeletal:  [ ] myalgias [ ] arthralgias [ ] arthritis  [ ] back pain  Neurological:  [ ] headache [ ] seizures  [ ] confusion/altered mental status  Psychiatric:  [ ] anxiety [ ] depression	  Hematology/Lymphatics:  [ ] lymphadenopathy  Endocrine:  [ ] adrenal [ ] thyroid  Allergic/Immunologic:	 [ ] transplant [ ] seasonal    Vital Signs Last 24 Hrs  T(F): 97.4 (12-27-22 @ 10:33), Max: 98.1 (12-26-22 @ 15:38)  Vital Signs Last 24 Hrs  HR: 72 (12-27-22 @ 10:33) (62 - 72)  BP: 125/69 (12-27-22 @ 10:33) (124/61 - 156/74)  RR: 18 (12-27-22 @ 10:33)  SpO2: 95% (12-27-22 @ 10:33) (95% - 99%)  Wt(kg): --    PHYSICAL EXAM:  Constitutional: non-toxic, no distress  HEAD/EYES: anicteric, no conjunctival injection  ENT:  supple, no thrush  Cardiovascular:   normal S1, S2, no murmur, no edema  Respiratory:  clear BS bilaterally, no wheezes, no rales  GI:  soft, non-tender, normal bowel sounds  :  no bob, no CVA tenderness  Musculoskeletal:  no synovitis, normal ROM  Neurologic: awake and alert, normal strength, no focal findings  Skin: Large right 5th metatarsal wound with necrotic wound bed; surrounding erythema and warmth; ascending edema up to mid-shin  Heme/Onc: no lymphadenopathy   Psychiatric:  awake, alert, appropriate mood                            11.0   6.50  )-----------( 204      ( 27 Dec 2022 10:09 )             34.6   12-27    137  |  107  |  35<H>  ----------------------------<  163<H>  4.3   |  19<L>  |  1.56<H>    Ca    8.9      27 Dec 2022 10:09  Phos  3.0     12-27  Mg     2.0     12-27    TPro  6.9  /  Alb  3.2<L>  /  TBili  0.3  /  DBili  x   /  AST  15  /  ALT  22  /  AlkPhos  99  12-27    MICROBIOLOGY:    MRSA/MSSA PCR (12.27.22 @ 10:10)   MRSA PCR Result.: NotDetec:     RADIOLOGY:  imaging below personally reviewed and agree with findings    < from: Xray Foot AP + Lateral + Oblique, Right (12.26.22 @ 20:34) >  IMPRESSION:  Erosive changes at the distal aspectof the fifth right metatarsal and   the proximal aspect of the fifth proximal phalanx at the MTP joint, new   from 11/9/2022. Findings are consistent with osteomyelitis/septic   arthritis.      < end of copied text >

## 2022-12-27 NOTE — H&P ADULT - NSHPSOCIALHISTORY_GEN_ALL_CORE
Lives with 3 adult children. Independent in ADLs, walks with cane or walker depending on situation. [place] with 3 steps to entrance, otherwise only stairs to basement which he does not do. Prior to this able to walk 3 miles a day without issue.   Denies alcohol in last five years (prior was social drinker)  Denies tobacco and other drug use

## 2022-12-27 NOTE — CONSULT NOTE ADULT - ATTENDING COMMENTS
No new complaints   No change in clinical condition   Stable from hepatology point of view for foot surgery  We have held the Everolimus due to it's potential effect on wound healing in immediate post op  Will review the chart to see what was the issue with CNIs and whether he can be re-trailed with them  Continue prednisone and cellcept   Check liver tests daily   Will follow up
67 years old male with HTN, IDDM (A1C 6.7 in 11/2022), HF, DM neuropathy, PUD, DVT, JEAN cirrhosis s/p OLT 7/2/2020, hx CMV viremia, hx VRE bacteremia, multiple R foot wounds/resections, presented with right foot discomfort.    Wound of 5th digit RLE with probe to bone  XR of 5th digit concerning for OM  Would cover with Zosyn pending cultures  Would followup on final podiatry plan  Would check CMV Serum PCR given preceding low level viremia    I will continue to follow. Please feel free to contact me with any further questions.    Eusebio Pérez M.D.  [facility] Division of Infectious Disease  8AM-5PM Monday - Friday: Available on [employer] Teams  After Hours and Holidays (or if no response on [employer] Teams): Please contact the Infectious Diseases Office at [phone]

## 2022-12-27 NOTE — PROGRESS NOTE ADULT - ASSESSMENT
67 M with right foot lateral 5th metatarsal wound to bone    - Pt seen and evaluated  - Afebrile, WBC 9.39,  ESR/CRP pending   - Right foot lateral 5th metatarsal wound to bone, necrotic wound bed, no malodor, scant serosanguinous drainage, erythema improved to the lateral midfoot, edema improved. Left foot with no acute signs of infection.   - Right foot Xray: 5th met and proximal phalanx OM   - Right foot wound culture pending  - Continue IV vancomycin and cefepime   - LETICIA/PVR: ABIs noncompressible b/l, waveforms normal - no arterial disease  - Patient is tentatively book for right foot partial fifth ray amputation and possible graft application on Thurs 12/29 w Dr Wang   - Please document medical clearance for podiatry procedure under anesthesia   - Seen with attending

## 2022-12-27 NOTE — CONSULT NOTE ADULT - ASSESSMENT
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  Patient initially evaluated in August 2022 for RLE wound, found to have right toe osteomyelitis, underwent partial right hallux amputation on 9/2/2022.  Wound cultures at that time were growing Bacteroides, GBS, Staph spp, and was ultimately discharged home with outpatient PT on 9/5/22. He presented back to [facility] ED on 11/3/22 from home and ultimately underwent partial amputation of right second toe on 11/9/2022.  He states he recovered from this, however he is now coming back to [facility] ED on 12/27/2022 due to a worsening and open wound over his right fifth digit.  He denies fevers at home.  Patient has history of LT in July 2020 due to JEAN cirrhosis.  Post-LT course c/b VRE bacteremia, CNI toxicity to both tacrolimus and cyclosporine, and is currently maintained on everolimus, CellCept, and prednisone.    # Right fifth toe osteomyelitis  # JEAN cirrhosis s/p LT 7/2/2020: maintained on everolimus and CellCept, had elevated liver chemistries in December 2021 s/p liver biopsy which was negative for evidence of rejection but revealed NRH  # Low level CMV viremia June & August 2022, however undetectable November 2022  # CKD    Recommendations:  -trend clinical symptoms, exam findings, vital signs, CBC, CMP, INR  -Immunosuppression:       -Active/Maintenance: on home everolimus 3mg BID and CellCept 250mg BID; hold CellCept for now; patient at risk for impaired wound healing with everolimus however limited treatment options given significant CNI neurotoxicity  -check everolimus level  -repeat CMV PCR  -7, 30, and 90 day post-operative mortality calculated at 2%, 10%, and 16%, respectively, based upon the Lopez Score  -antibiotics and management of osteomyelitis and remainder per primary team    Note incomplete until finalized by attending signature/attestation.    Gallo Rodriguez  GI/Hepatology Fellow    MONDAY-FRIDAY 8AM-5PM:  Pager# [phone] ([facility]) or [phone] ([facility])    NON-URGENT CONSULTS:  Please email [email or web address]  AT NIGHT AND ON WEEKENDS:  Contact on-call GI fellow via answering service ([phone]) from 5pm-8am and on weekends/holidays   67 year old male with history of JEAN cirrhosis s/p OLT 7/2/20 (course c/b VRE bacteremia, CNI toxicity to both tacro and cyclosporine; switched to everolimus 7/27/20; low level CMV viremia 6/28/2022, still detected on 8/31/2022, however undetectable in November 2022), HLD, IDDM, obesity, HFpEF, nephrolithiasis who presents with recurrent right foot infection.  Patient initially evaluated in August 2022 for RLE wound, found to have right toe osteomyelitis, underwent partial right hallux amputation on 9/2/2022.  Wound cultures at that time were growing Bacteroides, GBS, Staph spp, and was ultimately discharged home with outpatient PT on 9/5/22. He presented back to [facility] ED on 11/3/22 from home and ultimately underwent partial amputation of right second toe on 11/9/2022.  He states he recovered from this, however he is now coming back to [facility] ED on 12/27/2022 due to a worsening and open wound over his right fifth digit.  He denies fevers at home.  Patient has history of LT in July 2020 due to JEAN cirrhosis.  Post-LT course c/b VRE bacteremia, CNI toxicity to both tacrolimus and cyclosporine, and is currently maintained on everolimus, CellCept, and prednisone.    # Right fifth toe osteomyelitis  # JEAN cirrhosis s/p LT 7/2/2020: maintained on everolimus and CellCept, had elevated liver chemistries in December 2021 s/p liver biopsy which was negative for evidence of rejection but revealed NRH  # Low level CMV viremia June & August 2022, however undetectable November 2022  # CKD    Recommendations:  -trend clinical symptoms, exam findings, vital signs, CBC, CMP, INR  -Immunosuppression:       -Active/Maintenance: on home everolimus 3mg BID and CellCept 250mg BID; patient at risk for impaired wound healing with everolimus however limited treatment options given significant CNI neurotoxicity  -check everolimus level  -acceptable to hold everolimus, and continue with home CellCept and prednisone tonight  -repeat CMV PCR  -7, 30, and 90 day post-operative mortality calculated at 2%, 10%, and 16%, respectively, based upon the Lopez Score  -antibiotics and management of osteomyelitis and remainder per primary team    Note incomplete until finalized by attending signature/attestation.    Gallo Rodriguez  GI/Hepatology Fellow    MONDAY-FRIDAY 8AM-5PM:  Pager# [phone] ([facility]) or [phone] ([facility])    NON-URGENT CONSULTS:  Please email [email or web address]  AT NIGHT AND ON WEEKENDS:  Contact on-call GI fellow via answering service ([phone]) from 5pm-8am and on weekends/holidays

## 2022-12-27 NOTE — H&P ADULT - PROBLEM SELECTOR PLAN 2
pH 7.24; pCO2 47, Bicarb 17, AG 11, RR 16  Metabolic acidosis with concomitant respiratory acidosis  note acidosis in 11/22 7.29 may be chronic in setting of CKD  -Bicarb tabs Medical optimization statement:  ECG with NSR, Echo in november with mild diastolic dysfunction and normal EF  RICI Score 2 points, Class III Risk, 10.1 % 30-day risk of death, MI, or cardiac arrest  No further workup/treatment is required to decrease risk of surgery     Not final until attested by attending Preop evaluation for podiatry surgery (partial 5th ray amputation w/ R foot debridement and possible graft application):    - RCRI score of 2 points (Class III Risk) indicating 10.1% 30-day risk of death, MI, or cardiac arrest  - Admission ECG was NSR without signs of acute ischemia; Recent TTE (Nov 2022) with mild diastolic dysfunction and normal EF  - Exam with pitting edema in b/l LEs (L>R, pt reports chronic), but overall does not appear to be in CHF exacerbation  - No prior adverse reaction with anesthesia  - Given pt's comorbidities, pt is at elevated risk for low-intermediate risk surgery  - In setting of prior DVT (1/2022), pt reporting he is not longer on AC, and significant b/l LE swelling, would obtain LE duplex to r/o new DVT and also repeat blood gas for pt's metabolic acidosis prior to proceeding with surgery given that it may change danilo-surgery management.

## 2022-12-27 NOTE — PROGRESS NOTE ADULT - PROBLEM SELECTOR PLAN 2
Preop evaluation for podiatry surgery (partial 5th ray amputation w/ R foot debridement and possible graft application):    - RCRI score of 2 points (Class III Risk) indicating 10.1% 30-day risk of death, MI, or cardiac arrest  - Admission ECG was NSR without signs of acute ischemia; Recent TTE (Nov 2022) with mild diastolic dysfunction and normal EF  - Exam with pitting edema in b/l LEs (L>R, pt reports chronic), but overall does not appear to be in CHF exacerbation  - No prior adverse reaction with anesthesia  - Given pt's comorbidities, pt is at elevated risk for low-intermediate risk surgery  - In setting of prior DVT (1/2022), pt reporting he is not longer on AC, and significant b/l LE swelling, would obtain LE duplex to r/o new DVT and also repeat blood gas for pt's metabolic acidosis prior to proceeding with surgery given that it may change danilo-surgery management.

## 2022-12-27 NOTE — CONSULT NOTE ADULT - ASSESSMENT
67 years old male with HTN, IDDM (A1C 6.7 in 11/2022), HF, DM neuropathy, PUD, DVT, JEAN cirrhosis s/p OLT 7/2/2020, hx CMV viremia, hx VRE bacteremia, multiple R foot wounds/resections, presented with right foot discomfort.    ID is consulted for R foot OM  Recently had partial amputation of 2nd digit 11/9, low concern for residual infection, low concern of viability; D/C course of Augmentin x 5 days post-op  Afebrile, no leukocytosis  ESR 99, CRP 38, similar to 11/2022  R foot xray showed erosive changes at the distal aspect of the fifth right metatarsal and the proximal aspect of the fifth proximal phalanx at the MTP joint  S/p bedside I&D, wound Cx pending  BCx pending  MRSA PCR pending    Antibiotics:  Vancomycin 12/26  Cefepime 12/26 ->      IMPRESSION:      RECOMMENDATIONS:      * THIS IS AN INCOMPLETE NOTE. FINAL RECOMMENDATION WILL BE UPDATED AFTER DISCUSSING WITH ATTENDING *       67 years old male with HTN, IDDM (A1C 6.7 in 11/2022), HF, DM neuropathy, PUD, DVT, JEAN cirrhosis s/p OLT 7/2/2020, hx CMV viremia, hx VRE bacteremia, multiple R foot wounds/resections, presented with right foot discomfort.    ID is consulted for R foot OM  Recently had partial amputation of 2nd digit 11/9, low concern for residual infection, low concern of viability; D/C course of Augmentin x 5 days post-op  Afebrile, no leukocytosis  ESR 99, CRP 38, similar to 11/2022  R foot xray showed erosive changes at the distal aspect of the fifth right metatarsal and the proximal aspect of the fifth proximal phalanx at the MTP joint  S/p bedside I&D, wound Cx pending  BCx pending  MRSA PCR pending    Antibiotics:  Vancomycin 12/26  Cefepime 12/26 ->    Right foot wound look infected with surrounding cellulitis  Would treat for SSTIs with Zosyn with anaerobic and pseudomonas coverage (+ pseudomonas in prior culture)  CMV PCR negative 11/19/22      IMPRESSION:  Right foot ulcer  Right foot cellulitis  S/p OLT 6/2020  Hx CMV viremia    RECOMMENDATIONS:  - D/C cefepime, start IV Zosyn 3.375gm q8hrs  - Check CMV PCR  - Follow up wound culture and blood culture  - Podiatry follow up  - Trend WBC, fever curve, transaminases, creatinine daily  - Will continue to follow      Patient is seen and examined with attending and case is discussed with primary team      Betty Andersen D.O.  PGY-5 Infectious Diseases Fellow  Please contact me via page or text through [employer] Teams  If after 5PM or on weekends, please call [phone]

## 2022-12-27 NOTE — H&P ADULT - ASSESSMENT
66M PMHx HTN, IDDM, diabetic foot ulcers, CKD, HFpEF, neuropathy, PUD, nephrolithiasis, JEAN cirrhosis s/p OLT 7/2/2020 VRE bacteremia, CMV viremia, lower extremity edema, and hx multiple toe amputations P/W 2 weeks of lateral right foot discomfort followed by outpt podiatrist. 0/4 SRS, ESR 99, Xray c/f OM on prelim read also with metabolic acidosis     Impression: OM of R 5th meta tarsal c/b metabolic acidosis 67M PMHx HTN, IDDM, diabetic foot ulcers, CKD, HFpEF, neuropathy, PUD, nephrolithiasis, JEAN cirrhosis s/p OLT 7/2/2020 VRE bacteremia, CMV viremia, lower extremity edema, and hx multiple toe amputations P/W 2 weeks of lateral right foot discomfort followed by outpt podiatrist. 0/4 SRS, ESR 99, Xray c/f OM on prelim read also with metabolic acidosis     Impression: OM of R 5th meta tarsal c/b metabolic acidosis

## 2022-12-27 NOTE — H&P ADULT - NSHPLABSRESULTS_GEN_ALL_CORE
11.2   9.39  )-----------( 214      ( 26 Dec 2022 20:08 )             35.3       12-26    134<L>  |  106  |  40<H>  ----------------------------<  185<H>  5.1   |  17<L>  |  1.60<H>    Ca    9.1      26 Dec 2022 20:08  Phos  3.7     12-26  Mg     2.0     12-26    TPro  7.1  /  Alb  3.3  /  TBili  0.2  /  DBili  x   /  AST  18  /  ALT  30  /  AlkPhos  121<H>  12-26                  PT/INR - ( 26 Dec 2022 20:08 )   PT: 12.2 sec;   INR: 1.06 ratio         PTT - ( 26 Dec 2022 20:08 )  PTT:27.3 sec          CAPILLARY BLOOD GLUCOSE 11.2   9.39  )-----------( 214      ( 26 Dec 2022 20:08 )             35.3       12-26    134<L>  |  106  |  40<H>  ----------------------------<  185<H>  5.1   |  17<L>  |  1.60<H>    Ca    9.1      26 Dec 2022 20:08  Phos  3.7     12-26  Mg     2.0     12-26    TPro  7.1  /  Alb  3.3  /  TBili  0.2  /  DBili  x   /  AST  18  /  ALT  30  /  AlkPhos  121<H>  12-26          PT/INR - ( 26 Dec 2022 20:08 )   PT: 12.2 sec;   INR: 1.06 ratio    PTT - ( 26 Dec 2022 20:08 )  PTT:27.3 sec    IMAGING / ADDITIONAL TESTS: Personally reviewed.    EKG (12/26/22) personally reviewed: NSR, HR 68, QTc 444, no significant ST elevations or depressions appreciated    < from: Xray Foot AP + Lateral + Oblique, Right (12.26.22 @ 20:34) >  IMPRESSION:  Erosive changes at the distal aspect of the fifth right metatarsal and   the proximal aspect of the fifth proximal phalanx at the MTP joint, new   from 11/9/2022. Findings are concerning for osteomyelitis.  ******PRELIMINARY REPORT******    < end of copied text >

## 2022-12-27 NOTE — PROGRESS NOTE ADULT - PROBLEM SELECTOR PLAN 5
Echo in 11/22  EF: 61, mild AS, normal LVSF, mild diastolic dysfunction stage 1, normal RVSF, mild TR  Marked lower extremity edema, no crackles   -Confirm/clarify home lasix dose  -leg elevation Hx CNI tox, tolerating evolimus  Per EMR on Mycophenolate mofetil, prednisone  -C/W Prednisone 20mg  -C/W Everolimus 4mg BID  -C/W folic acid 1g/day  -DC Home Mycophenolate Hx CNI tox, tolerating evolimus  Per EMR on Mycophenolate mofetil, prednisone  -C/W Prednisone 20mg  -C/W Everolimus 4mg BID  -C/W folic acid 1g/day  -Hold Home Mycophenolate

## 2022-12-27 NOTE — H&P ADULT - PROBLEM SELECTOR PLAN 3
Echo in 11/22  EF: 61, mild AS, normal LVSF, mild diastolic dysfunction stage 1, normal RVSF, mild TR  Marked lower extremity edema, no crackles   -Confirm home lasix dose  -Continue home lasix Pt with long medication list. Pt unsure of exact meds and dosage noting our system should have all meds. Medication list not consistent across outpatient notes, discharge notes, and what has actually been filled.  Recommend follow up during daytime and ask his adult children to bring med list. (specifically eliquis as heme outpt note in early december states can DC if clear from transplant team)  Per chart review last filled lasix, rosuvastatin, tamsulosin, eliquis in may of this year or prior.   -Please confirm if taking these as well  -Confirm if still taking levofloxacin (got 10 day supply in early december)  -confirm Everolimus and prednisone dosing

## 2022-12-27 NOTE — PROGRESS NOTE ADULT - PROBLEM SELECTOR PLAN 7
A1C in november 6.7  Hx Diabetic foot ulcer, neuropathy, and CKD  notes occasional home glucose over 300 but usually low-mid 100s  At home takes Humalog 9U TID w/ meals and Lantus 14 units at night  -hold Humalog, can resume pending ISS  -Lantus 10U qHS for now  -ISS Reported baseline 1.6-1.8  November labs during prior hospitalization mostly in line with that with some exceptions  sCr on admission 1.6

## 2022-12-27 NOTE — CONSULT NOTE ADULT - SUBJECTIVE AND OBJECTIVE BOX
HPI:  JESSICA MARTIN is a 67 year old male with history of JEAN cirrhosis s/p OLT 7/2/20 (course c/b VRE bacteremia, CNI toxicity to both tacro and cyclosporine; switched to everolimus 7/27/20; low level CMV viremia 6/28/2022, still detected on 8/31/2022, however undetectable in November 2022), HLD, IDDM, obesity, HFpEF, nephrolithiasis who presents with recurrent right foot infection.      Patient initially evaluated in August 2022 for RLE wound, found to have right toe osteomyelitis, underwent partial right hallux amputation on 9/2/2022.  Wound cultures at that time were growing Bacteroides, GBS, Staph spp, and was ultimately discharged home with outpatient PT on 9/5/22. He presented back to [facility] ED on 11/3/22 from home and ultimately underwent partial amputation of right second toe on 11/9/2022.  He states he recovered from this, however he is now coming back to [facility] ED on 12/27/2022 due to a worsening and open wound over his right fifth digit.  He denies fevers at home.    Patient has history of LT in July 2020 due to JEAN cirrhosis.  Post-LT course c/b VRE bacteremia, CNI toxicity to both tacrolimus and cyclosporine, and is currently maintained on everolimus, CellCept, and predniosne.  During prior hospitalizations, he was unable to transition to CNI given prior toxicity, and recommended to hold everolimus until healing from wound completed, restart CellCept at 250mg BID, and prednisone 10mg daily.    Otherwise, patient denies fevers, chills, weight loss, dysphagia, odynophagia, early satiety, poor oral intake, abdominal pain, nausea, vomiting, diarrhea, melena, hematemesis, hematochezia.    ROS:   General:  No fevers, chills, night sweats, fatigue  Eyes:  Good vision, no reported pain  ENT:  No sore throat, pain, runny nose  CV:  No pain, palpitations  Pulm:  No dyspnea, cough  GI:  See HPI, otherwise negative  :  No  incontinence, nocturia  Muscle:  +RLE pain/wound  Neuro:  No memory problems  Psych:  No insomnia, mood problems, depression  Endocrine:  No polyuria, polydipsia, cold/heat intolerance  Heme:  No petechiae, ecchymosis, easy bruisability  Skin:  No rash    PMHX/PSHX:    Diabetes    Hypercholesteremia    Neuropathy    Hypertension    Renal stones    Fatty liver disease, nonalcoholic    Obesity    Hepatic encephalopathy    GERD with esophagitis    GIB (gastrointestinal bleeding)    No significant past surgical history    S/P cholecystectomy      Allergies:  codeine (Anaphylaxis)      Home Medications: reviewed  Hospital Medications:  chlorhexidine 2% Cloths 1 Application(s) Topical daily  dextrose 5%. 1000 milliLiter(s) IV Continuous <Continuous>  dextrose 5%. 1000 milliLiter(s) IV Continuous <Continuous>  dextrose 50% Injectable 25 Gram(s) IV Push once  dextrose 50% Injectable 12.5 Gram(s) IV Push once  dextrose 50% Injectable 25 Gram(s) IV Push once  dextrose Oral Gel 15 Gram(s) Oral once PRN  everolimus (ZORTRESS) 4 milliGRAM(s) Oral two times a day  folic acid 1 milliGRAM(s) Oral daily  glucagon  Injectable 1 milliGRAM(s) IntraMuscular once  insulin glargine Injectable (LANTUS) 10 Unit(s) SubCutaneous at bedtime  insulin lispro (ADMELOG) corrective regimen sliding scale   SubCutaneous three times a day before meals  pantoprazole    Tablet 40 milliGRAM(s) Oral before breakfast  piperacillin/tazobactam IVPB. 3.375 Gram(s) IV Intermittent once  piperacillin/tazobactam IVPB.- 3.375 Gram(s) IV Intermittent once  polyethylene glycol 3350 17 Gram(s) Oral daily PRN  predniSONE   Tablet 10 milliGRAM(s) Oral daily  senna 2 Tablet(s) Oral at bedtime  sertraline 100 milliGRAM(s) Oral daily  tamsulosin 0.4 milliGRAM(s) Oral at bedtime      Social History:   Tobacco: denies  Alcohol: denies  Recreational drugs: denies    Family history:    No pertinent family history in first degree relatives    Family history of stomach cancer    Family history of hypertension    Family history of type 2 diabetes mellitus      Denies family history of colon cancer/polyps, stomach cancer/polyps, pancreatic cancer/masses, liver cancer/disease, ovarian cancer and endometrial cancer.    PHYSICAL EXAM:   Vital Signs:  Vital Signs Last 24 Hrs  T(C): 36.3 (27 Dec 2022 10:33), Max: 36.7 (27 Dec 2022 05:01)  T(F): 97.4 (27 Dec 2022 10:33), Max: 98 (27 Dec 2022 05:01)  HR: 72 (27 Dec 2022 10:33) (64 - 72)  BP: 125/69 (27 Dec 2022 10:33) (125/69 - 156/74)  BP(mean): --  RR: 18 (27 Dec 2022 10:33) (18 - 18)  SpO2: 95% (27 Dec 2022 10:33) (95% - 99%)    Parameters below as of 27 Dec 2022 10:33  Patient On (Oxygen Delivery Method): room air      Daily     Daily     GENERAL: no acute distress  NEURO: alert  HEENT: NCAT, no conjunctival pallor appreciated  CHEST: no respiratory distress, no accessory muscle use  CARDIAC: regular rate, +S1/S2  ABDOMEN: soft, nontender, no rebound or guarding  EXTREMITIES: warm, well perfused  SKIN: no lesions noted    LABS: reviewed                        11.0   6.50  )-----------( 204      ( 27 Dec 2022 10:09 )             34.6     12-27    137  |  107  |  35<H>  ----------------------------<  163<H>  4.3   |  19<L>  |  1.56<H>    Ca    8.9      27 Dec 2022 10:09  Phos  3.0     12-27  Mg     2.0     12-27    TPro  6.9  /  Alb  3.2<L>  /  TBili  0.3  /  DBili  x   /  AST  15  /  ALT  22  /  AlkPhos  99  12-27    LIVER FUNCTIONS - ( 27 Dec 2022 10:09 )  Alb: 3.2 g/dL / Pro: 6.9 g/dL / ALK PHOS: 99 U/L / ALT: 22 U/L / AST: 15 U/L / GGT: x               Diagnostic Studies: see sunrise for full report

## 2022-12-27 NOTE — H&P ADULT - PROBLEM SELECTOR PLAN 5
A1C in november 6.7  Hx Diabetic foot ulcer, neuropathy, and CKD  notes occasional home glucose over 300 but usually low-mid 100s  At home takes humolog 9U TID w/ meals and lantus 14 units at night  -Check A1C  -Humalog 8U TID w/ meals for now  -Lantus 9U qHS for now  -ISS Echo in 11/22  EF: 61, mild AS, normal LVSF, mild diastolic dysfunction stage 1, normal RVSF, mild TR  Marked lower extremity edema, no crackles   -Confirm/clarify home lasix dose  -leg elevation

## 2022-12-27 NOTE — PROGRESS NOTE ADULT - ASSESSMENT
67M PMHx HTN, IDDM, diabetic foot ulcers, CKD, HFpEF, neuropathy, PUD, nephrolithiasis, JEAN cirrhosis s/p OLT 7/2/2020 VRE bacteremia, CMV viremia, lower extremity edema, and hx multiple toe amputations P/W 2 weeks of lateral right foot discomfort followed by outpt podiatrist. 0/4 SRS, ESR 99, Xray c/f OM on prelim read also with metabolic acidosis     Impression: OM of R 5th meta tarsal c/b metabolic acidosis 67M PMHx HTN, IDDM, diabetic foot ulcers, CKD, HFpEF, neuropathy, PUD, nephrolithiasis, JEAN cirrhosis s/p OLT 7/2/2020 VRE bacteremia, CMV viremia, lower extremity edema, and hx multiple toe amputations P/W 2 weeks of lateral right foot discomfort in the setting of OM of R 5th metatarsal with plan for debridement and graft pending medical optimization

## 2022-12-27 NOTE — PROGRESS NOTE ADULT - PROBLEM SELECTOR PLAN 9
-C/W Home tamsulosin 0.4mg Diet: Consistent carb  DVT: Heparin SQH; no Eliquis at home  GI: c/w home protonix, c/w 2 senna  Dispo: Inpatient, pending pods plan

## 2022-12-27 NOTE — H&P ADULT - NSHPPHYSICALEXAM_GEN_ALL_CORE
T(C): 36.6 (12-27-22 @ 00:45), Max: 36.7 (12-26-22 @ 15:38)  HR: 72 (12-27-22 @ 00:45) (62 - 72)  BP: 126/67 (12-27-22 @ 00:45) (124/61 - 156/74)  RR: 18 (12-27-22 @ 00:45) (16 - 18)  SpO2: 98% (12-27-22 @ 00:45) (98% - 99%)  CONSTITUTIONAL: Well groomed, no apparent distress  EYES: EOMI, No conjunctival or scleral injection, non-icteric  ENMT: Oral mucosa with moist membranes  RESP: No respiratory distress, no use of accessory muscles; CTA b/l,   CV: RRR, +S1S2, no MRG  EXTREM: 4+ pitting edema to upper shin b/l L>R; R foot with resection on 1st and 2nd does, otherwise bandaged  GI: Soft, NT, ND, no rebound, no guarding; no palpable masses; no hepatosplenomegaly; no hernia palpated, +BSx4  SKIN: No rashes or ulcers noted; no subcutaneous nodules or induration palpable though R foot bandaged  NEURO: awake alert orientedx4, EOMI, TORREZ, speech clear and fluent with intake comprehension and intact recent and remote memory  PSYCH: Appropriate insight/judgment; A+O x 3, mood and affect appropriate, recent/remote memory intact T(C): 36.6 (12-27-22 @ 00:45), Max: 36.7 (12-26-22 @ 15:38)  HR: 72 (12-27-22 @ 00:45) (62 - 72)  BP: 126/67 (12-27-22 @ 00:45) (124/61 - 156/74)  RR: 18 (12-27-22 @ 00:45) (16 - 18)  SpO2: 98% (12-27-22 @ 00:45) (98% - 99%)    CONSTITUTIONAL: Well groomed, no apparent distress  EYES: EOMI, No conjunctival or scleral injection, non-icteric  ENMT: Oral mucosa with moist membranes  RESP: No respiratory distress, no use of accessory muscles; CTA b/l,   CV: RRR, +S1S2, no MRG; 3+ pitting edema to upper shin b/l LEs (L>R); 1+ pedal pulses b/l  MSK; no clubbing or cyanosis of hand digits; R foot with resection of 1st and 2nd toes, otherwise bandaged  GI: Soft, NT, ND, no rebound, no guarding; no palpable masses; no hepatosplenomegaly; no hernia palpated, +BSx4  SKIN: No rashes or ulcers noted; no subcutaneous nodules or induration palpable though R foot bandaged  NEURO: awake alert orientedx4, EOMI, TORREZ, speech clear and fluent with intake comprehension and intact recent and remote memory  PSYCH: Appropriate insight/judgment; A+O x 3, mood and affect appropriate

## 2022-12-27 NOTE — H&P ADULT - PROBLEM SELECTOR PLAN 7
-C/W Home tamsulosin 0.4mg A1C in november 6.7  Hx Diabetic foot ulcer, neuropathy, and CKD  notes occasional home glucose over 300 but usually low-mid 100s  At home takes Humalog 9U TID w/ meals and Lantus 14 units at night  -hold Humalog, can resume pending ISS  -Lantus 10U qHS for now  -ISS

## 2022-12-27 NOTE — PROGRESS NOTE ADULT - PROBLEM SELECTOR PLAN 4
pH 7.24; pCO2 47, Bicarb 17, AG 11, RR 16  Metabolic acidosis with concomitant respiratory acidosis  note acidosis in 11/22 7.29 may be chronic in setting of CKD  -repeat am vbg in am then consider bicarb tabs Echo in 11/22  EF: 61, mild AS, normal LVSF, mild diastolic dysfunction stage 1, normal RVSF, mild TR  Marked lower extremity edema, no crackles   -Confirm/clarify home lasix dose  -leg elevation

## 2022-12-27 NOTE — PROGRESS NOTE ADULT - SUBJECTIVE AND OBJECTIVE BOX
INCOMPLETE NOTE    Luis Manuel Govea | PGY1| Pager: [phone]  Interval Events:    REVIEW OF SYSTEMS:  CONSTITUTIONAL: No weakness, fevers or chills  EYES/ENT: No visual changes;  No vertigo or throat pain   NECK: No pain or stiffness  RESPIRATORY: No cough, wheezing, hemoptysis; No shortness of breath  CARDIOVASCULAR: No chest pain or palpitations  GASTROINTESTINAL: No abdominal or epigastric pain. No nausea, vomiting, or hematemesis; No diarrhea or constipation. No melena or hematochezia.  GENITOURINARY: No dysuria, frequency or hematuria  NEUROLOGICAL: No numbness or weakness  SKIN: No itching, burning, rashes, or lesions   All other review of systems is negative unless indicated above.    OBJECTIVE:  ICU Vital Signs Last 24 Hrs  T(C): 36.7 (27 Dec 2022 05:01), Max: 36.7 (26 Dec 2022 15:38)  T(F): 98 (27 Dec 2022 05:01), Max: 98.1 (26 Dec 2022 15:38)  HR: 64 (27 Dec 2022 05:01) (62 - 72)  BP: 131/74 (27 Dec 2022 05:01) (124/61 - 156/74)  BP(mean): --  ABP: --  ABP(mean): --  RR: 18 (27 Dec 2022 05:01) (16 - 18)  SpO2: 96% (27 Dec 2022 05:01) (96% - 99%)    O2 Parameters below as of 27 Dec 2022 05:01  Patient On (Oxygen Delivery Method): room air              CAPILLARY BLOOD GLUCOSE      POCT Blood Glucose.: 136 mg/dL (27 Dec 2022 08:48)      PHYSICAL EXAM:  General: WN/WD NAD  Neurology: A&Ox3, nonfocal, TORREZ x 4  Eyes: PERRLA/ EOMI, Gross vision intact  ENT/Neck: Neck supple, trachea midline, No JVD, Gross hearing intact  Respiratory: CTA B/L, No wheezing, rales, rhonchi  CV: RRR, +S1/S2, -S3/S4, no murmurs, rubs or gallops  Abdominal: Soft, NT, ND +BS, No HSM  MSK: 5/5 strength UE/LE bilaterally  Extremities: No edema, 2+ peripheral pulses  Skin: No Rashes, Hematoma, Ecchymosis  Incisions:   Tubes:    HOSPITAL MEDICATIONS:  MEDICATIONS  (STANDING):  cefepime   IVPB 2000 milliGRAM(s) IV Intermittent every 12 hours  dextrose 5%. 1000 milliLiter(s) (50 mL/Hr) IV Continuous <Continuous>  dextrose 5%. 1000 milliLiter(s) (100 mL/Hr) IV Continuous <Continuous>  dextrose 50% Injectable 25 Gram(s) IV Push once  dextrose 50% Injectable 12.5 Gram(s) IV Push once  dextrose 50% Injectable 25 Gram(s) IV Push once  everolimus (ZORTRESS) 4 milliGRAM(s) Oral two times a day  folic acid 1 milliGRAM(s) Oral daily  glucagon  Injectable 1 milliGRAM(s) IntraMuscular once  insulin glargine Injectable (LANTUS) 10 Unit(s) SubCutaneous at bedtime  insulin lispro (ADMELOG) corrective regimen sliding scale   SubCutaneous three times a day before meals  pantoprazole    Tablet 40 milliGRAM(s) Oral before breakfast  predniSONE   Tablet 10 milliGRAM(s) Oral daily  senna 2 Tablet(s) Oral at bedtime  sertraline 100 milliGRAM(s) Oral daily  tamsulosin 0.4 milliGRAM(s) Oral at bedtime  vancomycin  IVPB 1500 milliGRAM(s) IV Intermittent every 12 hours    MEDICATIONS  (PRN):  dextrose Oral Gel 15 Gram(s) Oral once PRN Blood Glucose LESS THAN 70 milliGRAM(s)/deciliter  polyethylene glycol 3350 17 Gram(s) Oral daily PRN Constipation      LABS:                        11.2   9.39  )-----------( 214      ( 26 Dec 2022 20:08 )             35.3     Hgb Trend: 11.2<--  12-26    134<L>  |  106  |  40<H>  ----------------------------<  185<H>  5.1   |  17<L>  |  1.60<H>    Ca    9.1      26 Dec 2022 20:08  Phos  3.7     12-26  Mg     2.0     12-26    TPro  7.1  /  Alb  3.3  /  TBili  0.2  /  DBili  x   /  AST  18  /  ALT  30  /  AlkPhos  121<H>  12-26    Creatinine Trend: 1.60<--  PT/INR - ( 26 Dec 2022 20:08 )   PT: 12.2 sec;   INR: 1.06 ratio         PTT - ( 26 Dec 2022 20:08 )  PTT:27.3 sec      Venous Blood Gas:  12-26 @ 20:09  7.24/47/49/20/75.9  VBG Lactate: 1.3      MICROBIOLOGY:      Luis Manuel Govea | PGY1| Pager: [phone]  Interval Events: NAEON; patient denies any fever, chills, n/v, or significant pain from LE wound    REVIEW OF SYSTEMS:  CONSTITUTIONAL: No weakness, fevers or chills  EYES/ENT: No visual changes;  No vertigo or throat pain   RESPIRATORY: No cough, wheezing, hemoptysis; No shortness of breath  CARDIOVASCULAR: No chest pain or palpitations  GASTROINTESTINAL: No abdominal or epigastric pain. No nausea, vomiting, or hematemesis  GENITOURINARY: No dysuria, frequency or hematuria  SKIN: RLE is wrapped; toe otherwise appears intact  All other review of systems is negative unless indicated above.    OBJECTIVE:  ICU Vital Signs Last 24 Hrs  T(C): 36.7 (27 Dec 2022 05:01), Max: 36.7 (26 Dec 2022 15:38)  T(F): 98 (27 Dec 2022 05:01), Max: 98.1 (26 Dec 2022 15:38)  HR: 64 (27 Dec 2022 05:01) (62 - 72)  BP: 131/74 (27 Dec 2022 05:01) (124/61 - 156/74)  BP(mean): --  ABP: --  ABP(mean): --  RR: 18 (27 Dec 2022 05:01) (16 - 18)  SpO2: 96% (27 Dec 2022 05:01) (96% - 99%)    O2 Parameters below as of 27 Dec 2022 05:01  Patient On (Oxygen Delivery Method): room air              CAPILLARY BLOOD GLUCOSE      POCT Blood Glucose.: 136 mg/dL (27 Dec 2022 08:48)      PHYSICAL EXAM:  General: WN/WD NAD  Neurology: A&Ox3, nonfocal, TORREZ x 4  Eyes: PERRLA/ EOMI, Gross vision intact  Respiratory: CTA B/L, No wheezing, rales, rhonchi  CV: RRR, +S1/S2, -S3/S4, no murmurs, rubs or gallops  Abdominal: Soft, NT, ND +BS, No HSM  MSK: 5/5 strength UE/LE bilaterally  Extremities: 2+ pitting edema in bilateral LE, 2+ peripheral pulses  Skin: No Rashes, Hematoma, Ecchymosis    HOSPITAL MEDICATIONS:  MEDICATIONS  (STANDING):  cefepime   IVPB 2000 milliGRAM(s) IV Intermittent every 12 hours  dextrose 5%. 1000 milliLiter(s) (50 mL/Hr) IV Continuous <Continuous>  dextrose 5%. 1000 milliLiter(s) (100 mL/Hr) IV Continuous <Continuous>  dextrose 50% Injectable 25 Gram(s) IV Push once  dextrose 50% Injectable 12.5 Gram(s) IV Push once  dextrose 50% Injectable 25 Gram(s) IV Push once  everolimus (ZORTRESS) 4 milliGRAM(s) Oral two times a day  folic acid 1 milliGRAM(s) Oral daily  glucagon  Injectable 1 milliGRAM(s) IntraMuscular once  insulin glargine Injectable (LANTUS) 10 Unit(s) SubCutaneous at bedtime  insulin lispro (ADMELOG) corrective regimen sliding scale   SubCutaneous three times a day before meals  pantoprazole    Tablet 40 milliGRAM(s) Oral before breakfast  predniSONE   Tablet 10 milliGRAM(s) Oral daily  senna 2 Tablet(s) Oral at bedtime  sertraline 100 milliGRAM(s) Oral daily  tamsulosin 0.4 milliGRAM(s) Oral at bedtime  vancomycin  IVPB 1500 milliGRAM(s) IV Intermittent every 12 hours    MEDICATIONS  (PRN):  dextrose Oral Gel 15 Gram(s) Oral once PRN Blood Glucose LESS THAN 70 milliGRAM(s)/deciliter  polyethylene glycol 3350 17 Gram(s) Oral daily PRN Constipation      LABS:                        11.2   9.39  )-----------( 214      ( 26 Dec 2022 20:08 )             35.3     Hgb Trend: 11.2<--  12-26    134<L>  |  106  |  40<H>  ----------------------------<  185<H>  5.1   |  17<L>  |  1.60<H>    Ca    9.1      26 Dec 2022 20:08  Phos  3.7     12-26  Mg     2.0     12-26    TPro  7.1  /  Alb  3.3  /  TBili  0.2  /  DBili  x   /  AST  18  /  ALT  30  /  AlkPhos  121<H>  12-26    Creatinine Trend: 1.60<--  PT/INR - ( 26 Dec 2022 20:08 )   PT: 12.2 sec;   INR: 1.06 ratio         PTT - ( 26 Dec 2022 20:08 )  PTT:27.3 sec      Venous Blood Gas:  12-26 @ 20:09  7.24/47/49/20/75.9  VBG Lactate: 1.3      MICROBIOLOGY:

## 2022-12-27 NOTE — H&P ADULT - PROBLEM SELECTOR PLAN 1
Podiatry following, per their assessment: Right foot lateral 5th metatarsal wound probing to bone with necrotic wound bed, slight malodor, scant serosanguinous drainage, wound bogginess, erythema expanding from lateral 5th met to digits 3-5, and global right foot non pitting edema. Left foot with no acute signs of infection.   Xray prelim read w/ erosive changes at the distal aspect of the fifth right metatarsal and the proximal aspect of the fifth proximal phalanx at the MTP joint, new from 11/9/2022. Findings are concerning for osteomyelitis.  Afebrile, 0/4 SRS, ESR 99; CRP pending  S/P Vanc and cefepime and 1 bolus in ED  -F/U xray official read  -F/U ABR/PVR  -F/U CRP  -F/U right wound culture  -F/U Podiatry recs, Podiatry plan is right foot partial fifth ray amputation with right foot debridement and possible graft application   -C/W Vanc by level  -C/W Cefepime    Medical optimization statement:  RICI Score 2 points, Class III Risk, 10.1 % 30-day risk of death, MI, or cardiac arrest  No further workup/treatment is required to decrease risk of surgery Podiatry following, per their assessment: Right foot lateral 5th metatarsal wound probing to bone with necrotic wound bed, slight malodor, scant serosanguinous drainage, wound bogginess, erythema expanding from lateral 5th met to digits 3-5, and global right foot non pitting edema. Left foot with no acute signs of infection.   Xray prelim read w/ erosive changes at the distal aspect of the fifth right metatarsal and the proximal aspect of the fifth proximal phalanx at the MTP joint, new from 11/9/2022. Findings are concerning for osteomyelitis.  Afebrile, 0/4 SRS, ESR 99; CRP pending  S/P Vanc and cefepime and 1 bolus in ED  -F/U xray official read  -F/U ABR/PVR  -F/U CRP  -F/U right wound culture  -F/U Podiatry recs, Podiatry plan is right foot partial fifth ray amputation with right foot debridement and possible graft application   -C/W Vanc, will do 1500mg BID (113 kg CrCl 72) and check troph prior to 4th dose  -C/W Cefepime 2g BID Podiatry following, per their assessment: Right foot lateral 5th metatarsal wound probing to bone with necrotic wound bed, slight malodor, scant serosanguinous drainage, wound bogginess, erythema expanding from lateral 5th met to digits 3-5, and global right foot non pitting edema. Left foot with no acute signs of infection.   Xray prelim read w/ erosive changes at the distal aspect of the fifth right metatarsal and the proximal aspect of the fifth proximal phalanx at the MTP joint, new from 11/9/2022. Findings are concerning for osteomyelitis.  Afebrile, 0/4 SRS, ESR 99; CRP 38  S/P Vanc and cefepime and 1 bolus in ED  -F/U xray official read  -F/U ABR/PVR  -F/U CRP  -F/U right wound culture  -F/U Podiatry recs, Podiatry plan is right foot partial fifth ray amputation with right foot debridement and possible graft application   -C/W Vanc, will do 1500mg BID (113 kg CrCl 72) and check troph prior to 4th dose  -C/W Cefepime 2g BID

## 2022-12-27 NOTE — H&P ADULT - HISTORY OF PRESENT ILLNESS
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 P/W 2 weeks of lateral right foot discomfort followed by outpt podiatrist. Hx of diabetic foot ulcer at same location from    No known hx of PAD.      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 P/W 2 weeks of lateral right foot discomfort followed by outpt podiatrist. Was on creams and Augmentin without improvement told to come to ED. Now without pain. Denies fevers/chills/sob/abd pain/diarrhea/hematuria/dysuria/frequency. Notes constipation, chronic and varies with diet. Hx of diabetic foot ulcer at same location [id number]. Of note pt hadblood glucose of 300 morning before presentation without polydipsia/polyuria/polyphagia/dehydration/syncope/presyncope. No known hx of PAD.    Pt independent in ADLS, walks with cane/walker, avoids stairs. Documentation reflects HFpEF, pt not aware of any cardiac dx, walks 3miles daily denies orthopnea, SOB, MOYER but notes LE edema. Hx nephrolithiasis as recently as september. CKD, per documentation has baseline cr 1.6-1.8, per pt has been told he may one day need a transplant.    Pt notes informed by pods likely needs large resection but prefers to do small resection accepting risk of worse outcomes.     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 P/W 2 weeks of lateral right foot discomfort followed by outpt podiatrist. Was on creams and Augmentin without improvement told to come to ED. Now without pain. Denies fevers/chills/sob/abd pain/diarrhea/hematuria/dysuria/frequency. Notes constipation, chronic and varies with diet. Hx of diabetic foot ulcer at same location [id number]/2022. Of note pt hadblood glucose of 300 morning before presentation without polydipsia/polyuria/polyphagia/dehydration/syncope/presyncope. No known hx of PAD.    Pt independent in ADLS, walks with cane/walker, avoids stairs. Documentation reflects HFpEF, pt not aware of any cardiac dx, walks 3miles daily denies orthopnea, SOB, MOYER but notes LE edema. Hx nephrolithiasis as recently as september. CKD, per documentation has baseline cr 1.6-1.8, per pt has been told he may one day need a transplant.    Pt notes informed by pods likely needs large resection but prefers to do small resection accepting risk of worse outcomes.     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 P/W 2 weeks of lateral right foot discomfort followed by outpt podiatrist. Was on creams and Augmentin without improvement told to come to ED. Now without pain. Denies fevers/chills/sob/abd pain/diarrhea/hematuria/dysuria/frequency. Notes constipation, chronic and varies with diet. Hx of diabetic foot ulcer at same location [id number]/2022. Of note pt hadblood glucose of 300 morning before presentation without polydipsia/polyuria/polyphagia/dehydration/syncope/presyncope. No known hx of PAD.    Pt independent in ADLS, walks with cane/walker, avoids stairs. Documentation reflects HFpEF, pt not aware of any cardiac dx, walks 3miles daily denies orthopnea, SOB, MOYER but notes LE edema. Hx nephrolithiasis as recently as september. CKD, per documentation has baseline cr 1.6-1.8, per pt has been told he may one day need a transplant.    Pt notes informed by pods likely needs large resection but prefers to do small resection accepting risk of worse outcomes.     PMHx: HTN, IDDM, CKD JEAN/Hepatic encephalopathy s/p transplant, Nephrolithiasis, HFpEF, VRE, CNI toxicity, PUD, Neuropathy, VRE, CMV, OM, Edema  PSHx: Liver xplant, cholecystectomy, elbow sx  Allergies: Codeine (throat swelling) and cats  Social: Denies MASOOD, was occasional drinker prior to xplant
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
## 2022-12-27 NOTE — PROGRESS NOTE ADULT - PROBLEM SELECTOR PLAN 3
Pt with long medication list. Pt unsure of exact meds and dosage noting our system should have all meds. Medication list not consistent across outpatient notes, discharge notes, and what has actually been filled.  Recommend follow up during daytime and ask his adult children to bring med list. (specifically eliquis as heme outpt note in early december states can DC if clear from transplant team)  Per chart review last filled lasix, rosuvastatin, tamsulosin, eliquis in may of this year or prior.   -Please confirm if taking these as well  -Confirm if still taking levofloxacin (got 10 day supply in early december)  -confirm Everolimus and prednisone dosing pH 7.24; pCO2 47, Bicarb 17, AG 11, RR 16  Metabolic acidosis with concomitant respiratory acidosis  note acidosis in 11/22 7.29 may be chronic in setting of CKD  Repeat VBG is WNL

## 2022-12-27 NOTE — H&P ADULT - PROBLEM SELECTOR PLAN 8
Medications: Pt states latest records in system are accurate and he takes medications as prescribed. He was unable to provide list to ensure correct list. Would follow up with him during daytime and ask his children to bring med list. (specifically eliquis as heme outpt note in early december states can DC if clear from transplant team)    Diet: Consistent carb  Per chart review last filled lasix, rosuvastatin, tamsulosin, eliquis may or prior. Please confirm if taking these.  DVT: subQ heparin pending med rec and confirming eliquis iso CKD and potential OR plan  Psych: c/w home sertraline  GI: c/w home protonix, c/w senna, can add miralax if needed  Dispo: Inpatient, pending pods plan  Other: c/w home folic acid Medications: Pt states latest records in system are accurate and he takes medications as prescribed. He was unable to provide list to ensure correct list. Would follow up with him during daytime and ask his children to bring med list. (specifically eliquis as heme outpt note in early december states can DC if clear from transplant team)    Diet: Consistent carb  Per chart review last filled lasix, rosuvastatin, tamsulosin, eliquis may or prior. Please confirm if taking these.  DVT: hx of provoked DVT, per outpt heme note can DC AC pending transplant assessment subQ heparin pending med rec and confirming eliquis iso CKD and potential OR plan  Psych: c/w home sertraline  GI: c/w home protonix, c/w senna, can add miralax if needed  Dispo: Inpatient, pending pods plan  Other: c/w home folic acid Reported baseline 1.6-1.8  November labs during prior hospitalization mostly in line with that with some exceptions  sCr on admission 1.6  -CTM  -Avoid nephrotoxins as able (On vanc)

## 2022-12-27 NOTE — H&P ADULT - PROBLEM SELECTOR PLAN 4
Hx CNI tox, tolerating evolimus  Per EMR on Mycophenolate mofetil, prednisone, levofloxacin  -C/W Prednisone, clarify dosing with pt in AM  -C/W Mycophenolate mofetil 500mg BID  -C/W Levofloxacin  -C/W Everolimus 1mg QD  -Confirm home meds pH 7.24; pCO2 47, Bicarb 17, AG 11, RR 16  Metabolic acidosis with concomitant respiratory acidosis  note acidosis in 11/22 7.29 may be chronic in setting of CKD  -repeat am vbg in am then consider bicarb tabs

## 2022-12-27 NOTE — H&P ADULT - PROBLEM SELECTOR PLAN 10
Diet: Consistent carb  DVT: hx of provoked DVT, per outpt heme note can DC AC pending transplant assessment subQ heparin pending med rec and confirming eliquis iso CKD and potential OR plan  Psych: c/w home sertraline  GI: c/w home protonix, c/w 2 senna, can miralax if needed  Dispo: Inpatient, pending pods plan

## 2022-12-28 NOTE — PROGRESS NOTE ADULT - PROBLEM SELECTOR PLAN 2
Preop evaluation for podiatry surgery (partial 5th ray amputation w/ R foot debridement and possible graft application):    - RCRI score of 2 points (Class III Risk) indicating 10.1% 30-day risk of death, MI, or cardiac arrest  - Admission ECG was NSR without signs of acute ischemia; Recent TTE (Nov 2022) with mild diastolic dysfunction and normal EF  - Exam with pitting edema in b/l LEs (L>R, pt reports chronic), but overall does not appear to be in CHF exacerbation  - No prior adverse reaction with anesthesia  - Given pt's comorbidities, pt is at elevated risk for low-intermediate risk surgery  - In setting of prior DVT (1/2022), pt reporting he is not longer on AC, and significant b/l LE swelling, would obtain LE duplex to r/o new DVT and also repeat blood gas for pt's metabolic acidosis prior to proceeding with surgery given that it may change danilo-surgery management. Hx CNI tox, tolerating evolimus  Per EMR on Mycophenolate mofetil, prednisone  -C/W Prednisone 20mg  -C/W folic acid 1g/day    Appreciate Transplant Hepatology Recs  -Holding Everolimus  -Hold Home Mycophenolate    Appreciate Transplant ID Recs  -F/U CMV PCR

## 2022-12-28 NOTE — PROGRESS NOTE ADULT - SUBJECTIVE AND OBJECTIVE BOX
Follow Up:      Interval History:    REVIEW OF SYSTEMS  [  ] ROS unobtainable because:    [  ] All other systems negative except as noted below    Constitutional:  [ ] fever [ ] chills  [ ] weight loss  [ ] weakness  Skin:  [ ] rash [ ] phlebitis	  Eyes: [ ] icterus [ ] pain  [ ] discharge	  ENMT: [ ] sore throat  [ ] thrush [ ] ulcers [ ] exudates  Respiratory: [ ] dyspnea [ ] hemoptysis [ ] cough [ ] sputum	  Cardiovascular:  [ ] chest pain [ ] palpitations [ ] edema	  Gastrointestinal:  [ ] nausea [ ] vomiting [ ] diarrhea [ ] constipation [ ] pain	  Genitourinary:  [ ] dysuria [ ] frequency [ ] hematuria [ ] discharge [ ] flank pain  [ ] incontinence  Musculoskeletal:  [ ] myalgias [ ] arthralgias [ ] arthritis  [ ] back pain  Neurological:  [ ] headache [ ] seizures  [ ] confusion/altered mental status    Allergies  codeine (Anaphylaxis)        ANTIMICROBIALS:  piperacillin/tazobactam IVPB.. 3.375 every 8 hours      OTHER MEDS:  MEDICATIONS  (STANDING):  atorvastatin 20 at bedtime  dextrose 50% Injectable 25 once  dextrose 50% Injectable 12.5 once  dextrose 50% Injectable 25 once  dextrose Oral Gel 15 once PRN  glucagon  Injectable 1 once  heparin   Injectable 5000 every 8 hours  insulin glargine Injectable (LANTUS) 10 at bedtime  insulin lispro (ADMELOG) corrective regimen sliding scale  three times a day before meals  mycophenolate mofetil 250 two times a day  pantoprazole    Tablet 40 before breakfast  polyethylene glycol 3350 17 daily PRN  predniSONE   Tablet 20 daily  senna 2 at bedtime  sertraline 100 daily  tamsulosin 0.4 at bedtime      Vital Signs Last 24 Hrs  T(C): 36.7 (28 Dec 2022 10:55), Max: 36.7 (27 Dec 2022 19:00)  T(F): 98.1 (28 Dec 2022 10:55), Max: 98.1 (28 Dec 2022 10:55)  HR: 76 (28 Dec 2022 10:55) (60 - 76)  BP: 132/70 (28 Dec 2022 10:55) (125/66 - 161/81)  BP(mean): --  RR: 18 (28 Dec 2022 10:55) (18 - 18)  SpO2: 96% (28 Dec 2022 10:55) (96% - 97%)    Parameters below as of 28 Dec 2022 10:55  Patient On (Oxygen Delivery Method): room air        PHYSICAL EXAMINATION:  General: Alert and Awake, NAD  HEENT: PERRL, EOMI  Neck: Supple  Cardiac: RRR, No M/R/G  Resp: CTAB, No Wh/Rh/Ra  Abdomen: NBS, NT/ND, No HSM, No rigidity or guarding  MSK: No LE edema. No Calf tenderness  : No bob  Skin: No rashes or lesions. Skin is warm and dry to the touch.   Neuro: Alert and Awake. CN 2-12 Grossly intact. Moves all four extremities spontaneously.  Psych: Calm, Pleasant, Cooperative                          10.3   5.62  )-----------( 192      ( 28 Dec 2022 06:56 )             31.8       12-28    139  |  109<H>  |  35<H>  ----------------------------<  173<H>  4.5   |  20<L>  |  1.86<H>    Ca    8.9      28 Dec 2022 06:55  Phos  3.3     12-28  Mg     1.9     12-28    TPro  x   /  Alb  x   /  TBili  0.2  /  DBili  x   /  AST  x   /  ALT  x   /  AlkPhos  x   12-28          MICROBIOLOGY:  v  .Abscess right foot  12-26-22   No growth  --  --      .Blood Blood  12-26-22   No growth to date.  --  --      .Blood Blood  12-26-22   No growth to date.  --  --                RADIOLOGY:    <The imaging below has been reviewed and visualized by me independently. Findings as detailed in report below> Follow Up:  Foot OM    Interval History: afebrile. no acute events.     REVIEW OF SYSTEMS  [  ] ROS unobtainable because:    [ x ] All other systems negative except as noted below    Constitutional:  [ ] fever [ ] chills  [ ] weight loss  [ ] weakness  Skin:  [ ] rash [ ] phlebitis	  Eyes: [ ] icterus [ ] pain  [ ] discharge	  ENMT: [ ] sore throat  [ ] thrush [ ] ulcers [ ] exudates  Respiratory: [ ] dyspnea [ ] hemoptysis [ ] cough [ ] sputum	  Cardiovascular:  [ ] chest pain [ ] palpitations [ ] edema	  Gastrointestinal:  [ ] nausea [ ] vomiting [ ] diarrhea [ ] constipation [ ] pain	  Genitourinary:  [ ] dysuria [ ] frequency [ ] hematuria [ ] discharge [ ] flank pain  [ ] incontinence  Musculoskeletal:  [ ] myalgias [ ] arthralgias [ ] arthritis  [ ] back pain +RLE Foot Wound  Neurological:  [ ] headache [ ] seizures  [ ] confusion/altered mental status    Allergies  codeine (Anaphylaxis)        ANTIMICROBIALS:  piperacillin/tazobactam IVPB.. 3.375 every 8 hours      OTHER MEDS:  MEDICATIONS  (STANDING):  atorvastatin 20 at bedtime  dextrose 50% Injectable 25 once  dextrose 50% Injectable 12.5 once  dextrose 50% Injectable 25 once  dextrose Oral Gel 15 once PRN  glucagon  Injectable 1 once  heparin   Injectable 5000 every 8 hours  insulin glargine Injectable (LANTUS) 10 at bedtime  insulin lispro (ADMELOG) corrective regimen sliding scale  three times a day before meals  mycophenolate mofetil 250 two times a day  pantoprazole    Tablet 40 before breakfast  polyethylene glycol 3350 17 daily PRN  predniSONE   Tablet 20 daily  senna 2 at bedtime  sertraline 100 daily  tamsulosin 0.4 at bedtime      Vital Signs Last 24 Hrs  T(C): 36.7 (28 Dec 2022 10:55), Max: 36.7 (27 Dec 2022 19:00)  T(F): 98.1 (28 Dec 2022 10:55), Max: 98.1 (28 Dec 2022 10:55)  HR: 76 (28 Dec 2022 10:55) (60 - 76)  BP: 132/70 (28 Dec 2022 10:55) (125/66 - 161/81)  BP(mean): --  RR: 18 (28 Dec 2022 10:55) (18 - 18)  SpO2: 96% (28 Dec 2022 10:55) (96% - 97%)    Parameters below as of 28 Dec 2022 10:55  Patient On (Oxygen Delivery Method): room air    PHYSICAL EXAMINATION:  General: Alert and Awake, NAD  HEENT: Normocephalic / Atraumatic  Resp: No accessory muscles of respiration utilized  Abdomen: Not distended.  MSK: +RLE 5th metatarsal wound with necrotic wound bed; surrounding erythema and warmth (improving)  : No bob  Skin: No rashes or lesions.    Neuro: Alert and Awake. CN 2-12 Grossly intact. Moves all four extremities spontaneously.  Psych: Calm, Pleasant, Cooperative                            10.3   5.62  )-----------( 192      ( 28 Dec 2022 06:56 )             31.8       12-28    139  |  109<H>  |  35<H>  ----------------------------<  173<H>  4.5   |  20<L>  |  1.86<H>    Ca    8.9      28 Dec 2022 06:55  Phos  3.3     12-28  Mg     1.9     12-28    TPro  x   /  Alb  x   /  TBili  0.2  /  DBili  x   /  AST  x   /  ALT  x   /  AlkPhos  x   12-28          MICROBIOLOGY:  v  .Abscess right foot  12-26-22   No growth  --  --      .Blood Blood  12-26-22   No growth to date.  --  --      .Blood Blood  12-26-22   No growth to date.  --  --    RADIOLOGY:    <The imaging below has been reviewed and visualized by me independently. Findings as detailed in report below>    < from: Xray Chest 1 View AP/PA (12.28.22 @ 11:21) >  IMPRESSION:  Clear lungs.    < end of copied text >

## 2022-12-28 NOTE — PROGRESS NOTE ADULT - PROBLEM SELECTOR PLAN 5
Hx CNI tox, tolerating evolimus  Per EMR on Mycophenolate mofetil, prednisone  -C/W Prednisone 20mg  -C/W Everolimus 4mg BID  -C/W folic acid 1g/day  -Hold Home Mycophenolate A1C in november 6.7  Hx Diabetic foot ulcer, neuropathy, and CKD  notes occasional home glucose over 300 but usually low-mid 100s  At home takes Humalog 9U TID w/ meals and Lantus 14 units at night  -hold Humalog, can resume pending ISS  -Lantus 10U qHS for now  -ISS

## 2022-12-28 NOTE — PATIENT PROFILE ADULT - FALL HARM RISK - HARM RISK INTERVENTIONS

## 2022-12-28 NOTE — PROGRESS NOTE ADULT - PROBLEM SELECTOR PLAN 3
pH 7.24; pCO2 47, Bicarb 17, AG 11, RR 16  Metabolic acidosis with concomitant respiratory acidosis  note acidosis in 11/22 7.29 may be chronic in setting of CKD  Repeat VBG is WNL Preop evaluation for podiatry surgery (partial 5th ray amputation w/ R foot debridement and possible graft application):    - RCRI score of 2 points (Class III Risk) indicating 10.1% 30-day risk of death, MI, or cardiac arrest  - Admission ECG was NSR without signs of acute ischemia; Recent TTE (Nov 2022) with mild diastolic dysfunction and normal EF  - Exam with pitting edema in b/l LEs (L>R, pt reports chronic), but overall does not appear to be in CHF exacerbation  - No prior adverse reaction with anesthesia  - Given pt's comorbidities, pt is at elevated risk for low-intermediate risk surgery  - In setting of prior DVT (1/2022), pt reporting he is not longer on AC, and significant b/l LE swelling, would obtain LE duplex to r/o new DVT and also repeat blood gas for pt's metabolic acidosis prior to proceeding with surgery given that it may change danilo-surgery management.

## 2022-12-28 NOTE — PROGRESS NOTE ADULT - ASSESSMENT
67M PMHx HTN, IDDM, diabetic foot ulcers, CKD, HFpEF, neuropathy, PUD, nephrolithiasis, JEAN cirrhosis s/p OLT 7/2/2020 VRE bacteremia, CMV viremia, lower extremity edema, and hx multiple toe amputations P/W 2 weeks of lateral right foot discomfort in the setting of OM of R 5th metatarsal with plan for debridement and graft pending medical optimization   67M PMHx HTN, IDDM, diabetic foot ulcers, CKD, HFpEF, neuropathy, PUD, nephrolithiasis, JEAN cirrhosis s/p OLT 7/2/2020 VRE bacteremia, CMV viremia, lower extremity edema, and hx multiple toe amputations P/W 2 weeks of lateral right foot discomfort in the setting of OM of R 5th metatarsal with plan for debridement and graft with plan for OR Tomorrow

## 2022-12-28 NOTE — PROGRESS NOTE ADULT - PROBLEM SELECTOR PLAN 9
Diet: Consistent carb  DVT: Heparin SQH; no Eliquis at home  GI: c/w home protonix, c/w 2 senna  Dispo: Inpatient, pending pods plan

## 2022-12-28 NOTE — PROGRESS NOTE ADULT - PROBLEM SELECTOR PLAN 7
Reported baseline 1.6-1.8  November labs during prior hospitalization mostly in line with that with some exceptions  sCr on admission 1.6 pH 7.24; pCO2 47, Bicarb 17, AG 11, RR 16  Metabolic acidosis with concomitant respiratory acidosis  note acidosis in 11/22 7.29 may be chronic in setting of CKD  Repeat VBG is WNL

## 2022-12-28 NOTE — PROGRESS NOTE ADULT - PROBLEM SELECTOR PROBLEM 2
Preoperative clearance Status post liver transplantation Pt c/o lower back pain radiating down right leg starting last night after uncorking a bottle. Pt states inability to walk. Pt able to transfer from private vehicle to stretcher. Pt states took 1600 mg Ibuprofen today without relief.

## 2022-12-28 NOTE — PATIENT PROFILE ADULT - PASTORAL.
Outcome: Comprehensive Health Assessment declined.    Member received post card and calling to schedule with [facility] primary care provider. No to Covid, Advised to wear mask. Updated [facility] Care team and will update [facility] and PQ phone number to [phone]. Comprehensive Health Assessment declined. No further needs at this time.      Please transfer to Patient Outreach Team at [phone] when patient returns call.    Attempt # 2        
chaplaincy/clergy/

## 2022-12-28 NOTE — PROGRESS NOTE ADULT - SUBJECTIVE AND OBJECTIVE BOX
INCOMPLETE NOTE    Luis Manuel Govea | PGY1| Pager: [phone]  Interval Events:    REVIEW OF SYSTEMS:  CONSTITUTIONAL: No weakness, fevers or chills  EYES/ENT: No visual changes;  No vertigo or throat pain   NECK: No pain or stiffness  RESPIRATORY: No cough, wheezing, hemoptysis; No shortness of breath  CARDIOVASCULAR: No chest pain or palpitations  GASTROINTESTINAL: No abdominal or epigastric pain. No nausea, vomiting, or hematemesis; No diarrhea or constipation. No melena or hematochezia.  GENITOURINARY: No dysuria, frequency or hematuria  NEUROLOGICAL: No numbness or weakness  SKIN: No itching, burning, rashes, or lesions   All other review of systems is negative unless indicated above.    OBJECTIVE:  ICU Vital Signs Last 24 Hrs  T(C): 36.6 (28 Dec 2022 00:55), Max: 36.7 (27 Dec 2022 19:00)  T(F): 97.8 (28 Dec 2022 00:55), Max: 98 (27 Dec 2022 19:00)  HR: 60 (28 Dec 2022 00:55) (60 - 72)  BP: 125/66 (28 Dec 2022 00:55) (125/66 - 161/81)  BP(mean): --  ABP: --  ABP(mean): --  RR: 18 (28 Dec 2022 00:55) (18 - 18)  SpO2: 97% (28 Dec 2022 00:55) (95% - 97%)    O2 Parameters below as of 28 Dec 2022 00:55  Patient On (Oxygen Delivery Method): room air              12-27 @ 07:01  -  12-28 @ 06:41  --------------------------------------------------------  IN: 0 mL / OUT: 400 mL / NET: -400 mL      CAPILLARY BLOOD GLUCOSE      POCT Blood Glucose.: 219 mg/dL (27 Dec 2022 22:10)      PHYSICAL EXAM:  General: WN/WD NAD  Neurology: A&Ox3, nonfocal, TORREZ x 4  Eyes: PERRLA/ EOMI, Gross vision intact  ENT/Neck: Neck supple, trachea midline, No JVD, Gross hearing intact  Respiratory: CTA B/L, No wheezing, rales, rhonchi  CV: RRR, +S1/S2, -S3/S4, no murmurs, rubs or gallops  Abdominal: Soft, NT, ND +BS, No HSM  MSK: 5/5 strength UE/LE bilaterally  Extremities: No edema, 2+ peripheral pulses  Skin: No Rashes, Hematoma, Ecchymosis  Incisions:   Tubes:    HOSPITAL MEDICATIONS:  MEDICATIONS  (STANDING):  atorvastatin 20 milliGRAM(s) Oral at bedtime  chlorhexidine 2% Cloths 1 Application(s) Topical daily  dextrose 5%. 1000 milliLiter(s) (100 mL/Hr) IV Continuous <Continuous>  dextrose 5%. 1000 milliLiter(s) (50 mL/Hr) IV Continuous <Continuous>  dextrose 50% Injectable 25 Gram(s) IV Push once  dextrose 50% Injectable 12.5 Gram(s) IV Push once  dextrose 50% Injectable 25 Gram(s) IV Push once  folic acid 1 milliGRAM(s) Oral daily  glucagon  Injectable 1 milliGRAM(s) IntraMuscular once  heparin   Injectable 5000 Unit(s) SubCutaneous every 8 hours  insulin glargine Injectable (LANTUS) 10 Unit(s) SubCutaneous at bedtime  insulin lispro (ADMELOG) corrective regimen sliding scale   SubCutaneous three times a day before meals  mycophenolate mofetil 250 milliGRAM(s) Oral two times a day  pantoprazole    Tablet 40 milliGRAM(s) Oral before breakfast  piperacillin/tazobactam IVPB.- 3.375 Gram(s) IV Intermittent once  piperacillin/tazobactam IVPB.. 3.375 Gram(s) IV Intermittent every 8 hours  predniSONE   Tablet 20 milliGRAM(s) Oral daily  senna 2 Tablet(s) Oral at bedtime  sertraline 100 milliGRAM(s) Oral daily  tamsulosin 0.4 milliGRAM(s) Oral at bedtime    MEDICATIONS  (PRN):  dextrose Oral Gel 15 Gram(s) Oral once PRN Blood Glucose LESS THAN 70 milliGRAM(s)/deciliter  polyethylene glycol 3350 17 Gram(s) Oral daily PRN Constipation      LABS:                        11.0   6.50  )-----------( 204      ( 27 Dec 2022 10:09 )             34.6     Hgb Trend: 11.0<--, 11.2<--  12-27    137  |  107  |  35<H>  ----------------------------<  163<H>  4.3   |  19<L>  |  1.56<H>    Ca    8.9      27 Dec 2022 10:09  Phos  3.0     12-27  Mg     2.0     12-27    TPro  6.9  /  Alb  3.2<L>  /  TBili  0.3  /  DBili  x   /  AST  15  /  ALT  22  /  AlkPhos  99  12-27    Creatinine Trend: 1.56<--, 1.60<--  PT/INR - ( 26 Dec 2022 20:08 )   PT: 12.2 sec;   INR: 1.06 ratio         PTT - ( 26 Dec 2022 20:08 )  PTT:27.3 sec      Venous Blood Gas:  12-27 @ 10:07  7.33/38/53/20/86.3  VBG Lactate: 1.1  Venous Blood Gas:  12-26 @ 20:09  7.24/47/49/20/75.9  VBG Lactate: 1.3      MICROBIOLOGY:     Culture - Abscess with Gram Stain (collected 26 Dec 2022 22:03)  Source: .Abscess right foot  Preliminary Report (27 Dec 2022 19:44):    No growth    Culture - Blood (collected 26 Dec 2022 19:55)  Source: .Blood Blood  Preliminary Report (28 Dec 2022 01:02):    No growth to date.    Culture - Blood (collected 26 Dec 2022 19:45)  Source: .Blood Blood  Preliminary Report (28 Dec 2022 01:02):    No growth to date.       Luis Manuel Govea | PGY1| Pager: [phone]  Interval Events: NAEON; patient denies any signficant complaints, denies any fevers, coughing, nausea/vomiting, diarrhea, reports no signficant pain from RLE    REVIEW OF SYSTEMS:  All other review of systems is negative unless indicated above.    OBJECTIVE:  ICU Vital Signs Last 24 Hrs  T(C): 36.6 (28 Dec 2022 00:55), Max: 36.7 (27 Dec 2022 19:00)  T(F): 97.8 (28 Dec 2022 00:55), Max: 98 (27 Dec 2022 19:00)  HR: 60 (28 Dec 2022 00:55) (60 - 72)  BP: 125/66 (28 Dec 2022 00:55) (125/66 - 161/81)  BP(mean): --  ABP: --  ABP(mean): --  RR: 18 (28 Dec 2022 00:55) (18 - 18)  SpO2: 97% (28 Dec 2022 00:55) (95% - 97%)    O2 Parameters below as of 28 Dec 2022 00:55  Patient On (Oxygen Delivery Method): room air              12-27 @ 07:01  -  12-28 @ 06:41  --------------------------------------------------------  IN: 0 mL / OUT: 400 mL / NET: -400 mL      CAPILLARY BLOOD GLUCOSE      POCT Blood Glucose.: 219 mg/dL (27 Dec 2022 22:10)      PHYSICAL EXAM:  General: NAD  Neurology: A&Ox3, nonfocal, TORREZ x 4  Respiratory: CTA B/L, No wheezing, rales, rhonchi  CV: RRR, +S1/S2, -S3/S4, no murmurs, rubs or gallops  Abdominal: Soft, NT, ND +BS, No HSM  Extremities: Edema now only trace, signficantly improved without medication  Skin: RLE is wrapped; dressing is clean and intact    HOSPITAL MEDICATIONS:  MEDICATIONS  (STANDING):  atorvastatin 20 milliGRAM(s) Oral at bedtime  chlorhexidine 2% Cloths 1 Application(s) Topical daily  dextrose 5%. 1000 milliLiter(s) (100 mL/Hr) IV Continuous <Continuous>  dextrose 5%. 1000 milliLiter(s) (50 mL/Hr) IV Continuous <Continuous>  dextrose 50% Injectable 25 Gram(s) IV Push once  dextrose 50% Injectable 12.5 Gram(s) IV Push once  dextrose 50% Injectable 25 Gram(s) IV Push once  folic acid 1 milliGRAM(s) Oral daily  glucagon  Injectable 1 milliGRAM(s) IntraMuscular once  heparin   Injectable 5000 Unit(s) SubCutaneous every 8 hours  insulin glargine Injectable (LANTUS) 10 Unit(s) SubCutaneous at bedtime  insulin lispro (ADMELOG) corrective regimen sliding scale   SubCutaneous three times a day before meals  mycophenolate mofetil 250 milliGRAM(s) Oral two times a day  pantoprazole    Tablet 40 milliGRAM(s) Oral before breakfast  piperacillin/tazobactam IVPB.- 3.375 Gram(s) IV Intermittent once  piperacillin/tazobactam IVPB.. 3.375 Gram(s) IV Intermittent every 8 hours  predniSONE   Tablet 20 milliGRAM(s) Oral daily  senna 2 Tablet(s) Oral at bedtime  sertraline 100 milliGRAM(s) Oral daily  tamsulosin 0.4 milliGRAM(s) Oral at bedtime    MEDICATIONS  (PRN):  dextrose Oral Gel 15 Gram(s) Oral once PRN Blood Glucose LESS THAN 70 milliGRAM(s)/deciliter  polyethylene glycol 3350 17 Gram(s) Oral daily PRN Constipation      LABS:                        11.0   6.50  )-----------( 204      ( 27 Dec 2022 10:09 )             34.6     Hgb Trend: 11.0<--, 11.2<--  12-27    137  |  107  |  35<H>  ----------------------------<  163<H>  4.3   |  19<L>  |  1.56<H>    Ca    8.9      27 Dec 2022 10:09  Phos  3.0     12-27  Mg     2.0     12-27    TPro  6.9  /  Alb  3.2<L>  /  TBili  0.3  /  DBili  x   /  AST  15  /  ALT  22  /  AlkPhos  99  12-27    Creatinine Trend: 1.56<--, 1.60<--  PT/INR - ( 26 Dec 2022 20:08 )   PT: 12.2 sec;   INR: 1.06 ratio         PTT - ( 26 Dec 2022 20:08 )  PTT:27.3 sec      Venous Blood Gas:  12-27 @ 10:07  7.33/38/53/20/86.3  VBG Lactate: 1.1  Venous Blood Gas:  12-26 @ 20:09  7.24/47/49/20/75.9  VBG Lactate: 1.3      MICROBIOLOGY:     Culture - Abscess with Gram Stain (collected 26 Dec 2022 22:03)  Source: .Abscess right foot  Preliminary Report (27 Dec 2022 19:44):    No growth    Culture - Blood (collected 26 Dec 2022 19:55)  Source: .Blood Blood  Preliminary Report (28 Dec 2022 01:02):    No growth to date.    Culture - Blood (collected 26 Dec 2022 19:45)  Source: .Blood Blood  Preliminary Report (28 Dec 2022 01:02):    No growth to date.

## 2022-12-28 NOTE — PROGRESS NOTE ADULT - PROBLEM SELECTOR PLAN 4
Echo in 11/22  EF: 61, mild AS, normal LVSF, mild diastolic dysfunction stage 1, normal RVSF, mild TR  Marked lower extremity edema, no crackles   -Confirm/clarify home lasix dose  -leg elevation Echo in 11/22  EF: 61, mild AS, normal LVSF, mild diastolic dysfunction stage 1, normal RVSF, mild TR  Marked lower extremity edema, no crackles   -Denies recent lasix usage

## 2022-12-28 NOTE — PROGRESS NOTE ADULT - PROBLEM SELECTOR PLAN 1
XR shows signs concerning for OM;   Podiatry plans for right foot partial fifth ray amputation with right foot debridement and possible graft application   Patient currently Afebrile, ESR 99; CRP 38    S/P Vanc and cefepime and 1 bolus in ED  -F/U ABR/PVR  -F/U right wound culture    Appreciate ID Recs  -Zosyn 3.375 q8h    Appreciate Transplant Hepatology Recs:  -Hold Everolimus  -Continue Mycophenolate 250mg BID XR shows signs concerning for OM;   Podiatry plans for right foot partial fifth ray amputation with right foot debridement and possible graft application   Patient currently Afebrile, ESR 99; CRP 38    S/P Vanc and cefepime and 1 bolus in ED  -F/U right wound culture; NGTD THus far  -F/U BLood Cx: NGTD Thus far  Appreciate ID Recs  -Zosyn 3.375 q8h    Appreciate Transplant Hepatology Recs:  -Hold Everolimus  -Continue Mycophenolate 250mg BID

## 2022-12-28 NOTE — PROGRESS NOTE ADULT - ASSESSMENT
67 M with right foot lateral 5th metatarsal wound to bone    - Pt seen and evaluated  - Afebrile, WBC 5.62  - Right foot lateral 5th metatarsal wound to bone, necrotic wound bed, no malodor, scant serosanguinous drainage, erythema improved to the lateral midfoot, edema improved. Left foot with no acute signs of infection.   - Right foot Xray: 5th met and proximal phalanx OM   - Right foot wound culture no growth prelim  - Continue IV vancomycin and cefepime   - LETICIA/PVR: ABIs noncompressible b/l, waveforms normal - no arterial disease  - Patient is tentatively book for right foot partial fifth ray amputation and possible graft application on Thurs 12/29 w Dr Wang   - Please document medical clearance for podiatry procedure under anesthesia   - Seen with attending

## 2022-12-29 NOTE — DISCHARGE NOTE PROVIDER - HOSPITAL COURSE
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 P/W 2 weeks of lateral right foot discomfort followed by outpt podiatrist. Was on creams and Augmentin without improvement told to come to ED. Now without pain. Denies fevers/chills/sob/abd pain/diarrhea/hematuria/dysuria/frequency. Notes constipation, chronic and varies with diet. Hx of diabetic foot ulcer at same location [id number]. Of note pt hadblood glucose of 300 morning before presentation without polydipsia/polyuria/polyphagia/dehydration/syncope/presyncope. No known hx of PAD.    Pt independent in ADLS, walks with cane/walker, avoids stairs. Documentation reflects HFpEF, pt not aware of any cardiac dx, walks 3miles daily denies orthopnea, SOB, MOYER but notes LE edema. Hx nephrolithiasis as recently as september. CKD, per documentation has baseline cr 1.6-1.8, per pt has been told he may one day need a transplant.    Pt notes informed by pods likely needs large resection but prefers to do small resection accepting risk of worse outcomes.     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 P/W 2 weeks of lateral right foot discomfort followed by outpt podiatrist. Patient was on creams and Augmentin without improvement told to come to ED. Now without pain.     Hospital Course:  CR of patient's R Foot showed signs concerning for OM. Patient was seen by podiatry and determined to need amputation of R 5th ray. Patient underwent MRI of RLE which confirmed findings. Patient underwent partial 5th ray amputation with keracis graft application. During the operation, patient was noted to have poor intraoperative bleeding, concerning for lack of viable tissue.    Bone Cultures were collected and showed no growth within 48 hours.    Patient was considered stable for DC to home.       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 P/W 2 weeks of lateral right foot discomfort followed by outpt podiatrist. Patient was on creams and Augmentin without improvement told to come to ED. Now without pain.     Hospital Course:  CR of patient's R Foot showed signs concerning for OM. Patient was seen by podiatry and determined to need amputation of R 5th ray. Patient underwent MRI of RLE which confirmed findings. Patient underwent partial 5th ray amputation with keracis graft application. During the operation, patient was noted to have poor intraoperative bleeding, concerning for lack of viable tissue.    Bone Cultures were collected and showed no growth within 48 hours.    Outpatient followup includes his   Dr. Carroll Wang in 1 week for wound care  PCP/Cardiologist Dr. Dillan Coto  Transplant ID Dr. Eusebio Pérez in 2 weeks  Transplant Hepatology Dr. Yomi Medina in 2 weeks due to a recently missed appointment and management of immunosuppressives      Patient was considered stable for DC to home.       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 P/W 2 weeks of lateral right foot discomfort followed by outpt podiatrist. Patient was on creams and Augmentin without improvement told to come to ED. Now without pain.     Hospital Course:  CR of patient's R Foot showed signs concerning for OM. Patient was seen by podiatry and determined to need amputation of R 5th ray. Patient underwent MRI of RLE which confirmed findings. Patient underwent partial 5th ray amputation with keracis graft application. During the operation, patient was noted to have poor intraoperative bleeding, concerning for lack of viable tissue.    Bone Cultures were collected and showed no growth within 48 hours.    Outpatient followup includes his   Dr. Carroll Wang in 1 week for wound care  PCP/Cardiologist Dr. Dillan Coto  Transplant ID Dr. Eusebio Pérez in 2 weeks  Transplant Hepatology Dr. Yomi Medina in 2 weeks due to a recently missed appointment and management of immunosuppressives    Wound Care Orders: Please apply wound vac with black foam to the R lateral foot graft @75mmHg 3x a week, thank you!   In Case wound vac fails, please apply wet to dry.      Patient was considered stable for DC to home.

## 2022-12-29 NOTE — PROGRESS NOTE ADULT - PROBLEM SELECTOR PLAN 5
A1C in november 6.7  Hx Diabetic foot ulcer, neuropathy, and CKD  notes occasional home glucose over 300 but usually low-mid 100s  At home takes Humalog 9U TID w/ meals and Lantus 14 units at night  -hold Humalog, can resume pending ISS  -Lantus 10U qHS for now  -ISS

## 2022-12-29 NOTE — BRIEF OPERATIVE NOTE - COMMENTS
s/p R foot partial 5th ray amputation + graft application   - Low concern for residual infection  - High concern for viability due to poor intraoperative bleeding  - Wound VAC to be applied tomorrow  - Follow cultures for 48 hours, stable for d/c if no growth after 48 hours Universal Safety Interventions

## 2022-12-29 NOTE — PROGRESS NOTE ADULT - PROBLEM SELECTOR PLAN 6
Reported baseline 1.6-1.8  November labs during prior hospitalization mostly in line with that with some exceptions  sCr on admission 1.6

## 2022-12-29 NOTE — PROGRESS NOTE ADULT - PROBLEM SELECTOR PLAN 2
Hx CNI tox, tolerating evolimus  Per EMR on Mycophenolate mofetil, prednisone  -C/W Prednisone 20mg  -C/W folic acid 1g/day    Appreciate Transplant Hepatology Recs  -Holding Everolimus  -Hold Home Mycophenolate    Appreciate Transplant ID Recs  -F/U CMV PCR

## 2022-12-29 NOTE — BRIEF OPERATIVE NOTE - OPERATION/FINDINGS
s/p R foot partial 5th ray amputation with keracis graft application. Poor intraoperative bleeding, bone was of good quality at proximal resection. No purulence or soft tissue tracking after resection.

## 2022-12-29 NOTE — PROVIDER CONTACT NOTE (OTHER) - ACTION/TREATMENT ORDERED:
Marcelino Parkinson aware of elevated blood sugar, no new orders at this time. 10 units of scheduled lantus @ bedtime was given

## 2022-12-29 NOTE — PROGRESS NOTE ADULT - PROBLEM SELECTOR PLAN 1
XR shows signs concerning for OM;   Podiatry plans for right foot partial fifth ray amputation with right foot debridement and possible graft application   Patient currently Afebrile, ESR 99; CRP 38    S/P Vanc and cefepime and 1 bolus in ED  -F/U right wound culture; NGTD THus far  -F/U BLood Cx: NGTD Thus far  Appreciate ID Recs  -Zosyn 3.375 q8h    Appreciate Transplant Hepatology Recs:  -Hold Everolimus  -Continue Mycophenolate 250mg BID XR shows signs concerning for OM;   Podiatry plans for right foot partial fifth ray amputation with right foot debridement and possible graft application   Patient currently Afebrile, ESR 99; CRP 38    S/P Vanc and cefepime and 1 bolus in ED  -F/U right wound culture; NGTD THus far  -F/U BLood Cx: NGTD Thus far  Appreciate ID Recs  -Zosyn 3.375 q8h    Appreciate Transplant Hepatology Recs:  -Hold Everolimus  -Continue Mycophenolate 250mg BID  -debridement with podiatry on 12/29, pending bone culture

## 2022-12-29 NOTE — BRIEF OPERATIVE NOTE - NSICDXBRIEFPREOP_GEN_ALL_CORE_FT
PRE-OP DIAGNOSIS:  Osteomyelitis of right foot 29-Dec-2022 12:00:34  Han Richards  Foot osteomyelitis, right 29-Dec-2022 12:00:48  Han Richards

## 2022-12-29 NOTE — DISCHARGE NOTE PROVIDER - NSDCFUADDAPPT_GEN_ALL_CORE_FT
Podiatry Discharge Instructions:  Follow up: Please follow up with Dr. Carroll Wang within 1 week of discharge from the hospital, please call [phone] for appointment and discuss that you recently were seen in the hospital.  Wound Care: Please apply wound vac to R foot @75mmHg three times a week over the graft.   Weight bearing: Please remain non weight bearing to the R foot.  Antibiotics: Please continue as instructed.

## 2022-12-29 NOTE — PROGRESS NOTE ADULT - PROBLEM SELECTOR PLAN 4
Echo in 11/22  EF: 61, mild AS, normal LVSF, mild diastolic dysfunction stage 1, normal RVSF, mild TR  Marked lower extremity edema, no crackles   -Denies recent lasix usage

## 2022-12-29 NOTE — DISCHARGE NOTE PROVIDER - NSFOLLOWUPCLINICS_GEN_ALL_ED_FT
[facility]  Internal Medicine  [address] [place] [address]  Phone: [phone]  Fax: [phone]  Follow Up Time: 1 week

## 2022-12-29 NOTE — DISCHARGE NOTE PROVIDER - NSDCCPTREATMENT_GEN_ALL_CORE_FT
PRINCIPAL PROCEDURE  Procedure: MRI foot right  Findings and Treatment: BONE MARROW: Limited by motion artifact. Status post amputations at the   first and second digits to the level of the base of the proximal   phalanges. There is edema and decreased T1 signal with small erosions   within the fifth metatarsal head and neck. Additional edema and decreased   T1 signal noted throughout the fifth digit including the proximal through   distal phalanges. Suspect an old fracture deformity of the fifth   metatarsal head and neck. No additional focus of edema or T1 signal   hypointensity is noted. Joint space narrowing and subchondral  < end of copied text >   cystic   changes across the tarsometatarsal joints with spurring at the first   tarsometatarsal joint.  SYNOVIUM/ JOINT FLUID: No large joint effusion  TENDONS: Tendons appear intact with surgical changes at the first and  second distal flexor and extensor tendons.  MUSCLES: Moderate to severe atrophy the intrinsic musculature the foot   with mild associated edema.  NEUROVASCULAR STRUCTURES: Preserved  SUBCUTANEOUS SOFT TISSUES: A skin wound is noted at the lateral aspect of   the forefoot with exposure of the underlying fifth metatarsophalangeal   joint. The skin wound extends deep and beneath the fifth metatarsal   distal third diaphysis. There is skin thickening and edema noted. Small   fluid collection noted along the medial margin the fifth middle and   distal phalanges.  IMPRESSION:  1.  Lateral forefoot skin wound with soft tissue edema and skin   thickening concerning for cellulitis.  2.  Abnormal marrow signal throughout the fifth metatarsal and fifth   digit. Given the adjacent skin wound, findings are concerning for   osteomyelitis.  3.  Small fluid collection noted at the medial margin of the fifth middle   and distal phalanges. May reflect a small infected fluid collection.< from: MR Foot No Cont, Right (12.28.22 @ 09:23) >        SECONDARY PROCEDURE  Procedure: US vein extremity lower bilateral  Findings and Treatment:   < end of copied text >  INTERPRETATION:  CLINICAL INFORMATION: Bilateral lower extremity   swelling, osteomyelitis right foot  COMPARISON: None available.  TECHNIQUE: Duplex sonography of the BILATERAL LOWER extremity veins with   color and spectral Doppler, with and without compression.  FINDINGS: There is edema within the superficial soft tissues of the right   and the left lower extremities.  RIGHT:  Normal compressibility of the RIGHT common femoral, femoral and popliteal   veins.  Doppler examination shows normal spontaneous and phasic flow.  No RIGHT calf vein thrombosis is detected.  LEFT:  Normal compressibility of the LEFT common femoral, femoral and popliteal   veins.  Doppler examination shows normal spontaneous and phasic flow.  No LEFT calf vein thrombosis is detected.  IMPRESSION:  No evidence of deep venous thrombosis in either lower extremity.< from: [facility] Duplex Lower Ext Vein Scan, Bilat (12.27.22 @ 12:56) >

## 2022-12-29 NOTE — PROGRESS NOTE ADULT - SUBJECTIVE AND OBJECTIVE BOX
Patient is a 67y old  Male who presents with a chief complaint of C/F Foot osteomyelitis (29 Dec 2022 07:43)      INTERVAL HPI/OVERNIGHT EVENTS:   Pt is scheduled for R foot fifth ray resection with PT tenotomy with Dr. Wang, at 10:30. Patient is aware of procedure and is NPO since midnight.    MEDICATIONS  (STANDING):  atorvastatin 20 milliGRAM(s) Oral at bedtime  chlorhexidine 2% Cloths 1 Application(s) Topical daily  dextrose 5%. 1000 milliLiter(s) (100 mL/Hr) IV Continuous <Continuous>  dextrose 5%. 1000 milliLiter(s) (50 mL/Hr) IV Continuous <Continuous>  dextrose 50% Injectable 25 Gram(s) IV Push once  dextrose 50% Injectable 12.5 Gram(s) IV Push once  dextrose 50% Injectable 25 Gram(s) IV Push once  folic acid 1 milliGRAM(s) Oral daily  glucagon  Injectable 1 milliGRAM(s) IntraMuscular once  heparin   Injectable 5000 Unit(s) SubCutaneous every 8 hours  insulin glargine Injectable (LANTUS) 10 Unit(s) SubCutaneous at bedtime  insulin lispro (ADMELOG) corrective regimen sliding scale   SubCutaneous three times a day before meals  mycophenolate mofetil 250 milliGRAM(s) Oral two times a day  pantoprazole    Tablet 40 milliGRAM(s) Oral before breakfast  piperacillin/tazobactam IVPB.. 3.375 Gram(s) IV Intermittent every 8 hours  predniSONE   Tablet 20 milliGRAM(s) Oral daily  senna 2 Tablet(s) Oral at bedtime  sertraline 100 milliGRAM(s) Oral daily  tamsulosin 0.4 milliGRAM(s) Oral at bedtime    MEDICATIONS  (PRN):  dextrose Oral Gel 15 Gram(s) Oral once PRN Blood Glucose LESS THAN 70 milliGRAM(s)/deciliter  polyethylene glycol 3350 17 Gram(s) Oral daily PRN Constipation      Allergies    codeine (Anaphylaxis)    Intolerances        Vital Signs Last 24 Hrs  T(C): 36.3 (29 Dec 2022 09:34), Max: 36.7 (28 Dec 2022 10:55)  T(F): 97.3 (29 Dec 2022 09:34), Max: 98.1 (28 Dec 2022 10:55)  HR: 66 (29 Dec 2022 09:34) (66 - 78)  BP: 134/73 (29 Dec 2022 09:34) (132/70 - 140/70)  BP(mean): --  RR: 18 (29 Dec 2022 09:34) (18 - 18)  SpO2: 94% (29 Dec 2022 09:34) (94% - 96%)    Parameters below as of 29 Dec 2022 09:34  Patient On (Oxygen Delivery Method): room air        LABS:                        10.6   5.79  )-----------( 200      ( 29 Dec 2022 06:21 )             32.9     12-29    137  |  106  |  34<H>  ----------------------------<  245<H>  4.4   |  21<L>  |  1.86<H>    Ca    9.0      29 Dec 2022 06:21  Phos  3.3     12-28  Mg     1.9     12-28    TPro  x   /  Alb  x   /  TBili  0.2  /  DBili  x   /  AST  x   /  ALT  x   /  AlkPhos  x   12-28    PT/INR - ( 29 Dec 2022 06:21 )   PT: 11.9 sec;   INR: 1.03 ratio             CAPILLARY BLOOD GLUCOSE      POCT Blood Glucose.: 201 mg/dL (29 Dec 2022 08:04)  POCT Blood Glucose.: 359 mg/dL (28 Dec 2022 21:31)  POCT Blood Glucose.: 339 mg/dL (28 Dec 2022 17:01)  POCT Blood Glucose.: 302 mg/dL (28 Dec 2022 12:20)      RADIOLOGY & ADDITIONAL TESTS:      Procedure was explained to patient in detail. All alternatives, risks and complications were discussed. All questions answered.

## 2022-12-29 NOTE — PROGRESS NOTE ADULT - ASSESSMENT
Plan:   Pt is scheduled for R foot fifth ray resection with PT tenotomy with Dr. Wang, at 10:30. Patient is aware of procedure and is NPO since midnight.  CXR on sunrise.  EKG on sunrise.  Medical/Cardiac clearance since 12/28/22 and documented in chart.  Consent signed and in chart.

## 2022-12-29 NOTE — DISCHARGE NOTE PROVIDER - CARE PROVIDERS DIRECT ADDRESSES
,[email or web address] ,[email or web address],DirectAddress_Unknown,[email or web address] ,[email or web address],DirectAddress_Unknown,[email or web address]

## 2022-12-29 NOTE — DISCHARGE NOTE PROVIDER - NSDCFUSCHEDAPPT_GEN_ALL_CORE_FT
Omaira Simmons  [facility]  Mirella Abraham  Scheduled Appointment: 03/10/2023

## 2022-12-29 NOTE — DISCHARGE NOTE PROVIDER - CARE PROVIDER_API CALL
Carroll Wang (DPM)  Podiatric Medicine and Surgery  [address] [place] [address]  Phone: [phone]  Fax: [phone]  Established Patient  Follow Up Time: 1 week   Carroll Wang (DPM)  Podiatric Medicine and Surgery  [address] [place] [address]  Phone: [phone]  Fax: [phone]  Established Patient  Follow Up Time: 1 week    AGUSTIN MURDOCK  Cardiology  [address] [place] [address]  Phone: [phone]  Fax: [phone]  Established Patient  Follow Up Time: 1 week    Eusebio Pérez)  Infectious Disease; Internal Medicine  [address] [place] [address]  Phone: [phone]  Fax: [phone]  Established Patient  Follow Up Time: 2 weeks   Carroll Wagn (DPM)  Podiatric Medicine and Surgery  [address] [place] [address]  Phone: [phone]  Fax: [phone]  Established Patient  Follow Up Time: 1 week    AGUSTIN MURDOCK  Cardiology  [address] [place] [address]  Phone: [phone]  Fax: [phone]  Established Patient  Follow Up Time: 1 week    Eusebio Pérez)  Infectious Disease; Internal Medicine  [address] [place] [address]  Phone: [phone]  Fax: [phone]  Established Patient  Follow Up Time: 2 weeks    Yomi Medina (MBBS; MS)  Gastroenterology; Internal Medicine; Transplant Hepatology  [address] [place] [address]  Phone: [phone]  Fax: [phone]  Established Patient  Follow Up Time: 2 weeks

## 2022-12-29 NOTE — DISCHARGE NOTE PROVIDER - PROVIDER TOKENS
PROVIDER:[TOKEN:[[id number]],FOLLOWUP:[1 week],ESTABLISHEDPATIENT:[T]] PROVIDER:[TOKEN:[[id number]],FOLLOWUP:[1 week],ESTABLISHEDPATIENT:[T]],PROVIDER:[TOKEN:[[id number]],FOLLOWUP:[1 week],ESTABLISHEDPATIENT:[T]],PROVIDER:[TOKEN:[[id number]],FOLLOWUP:[2 weeks],ESTABLISHEDPATIENT:[T]] PROVIDER:[TOKEN:[[id number]],FOLLOWUP:[1 week],ESTABLISHEDPATIENT:[T]],PROVIDER:[TOKEN:[[id number]],FOLLOWUP:[1 week],ESTABLISHEDPATIENT:[T]],PROVIDER:[TOKEN:[[id number]],FOLLOWUP:[2 weeks],ESTABLISHEDPATIENT:[T]],PROVIDER:[TOKEN:[[id number]],FOLLOWUP:[2 weeks],ESTABLISHEDPATIENT:[T]]

## 2022-12-29 NOTE — PROGRESS NOTE ADULT - ASSESSMENT
67M PMHx HTN, IDDM, diabetic foot ulcers, CKD, HFpEF, neuropathy, PUD, nephrolithiasis, JEAN cirrhosis s/p OLT 7/2/2020 VRE bacteremia, CMV viremia, lower extremity edema, and hx multiple toe amputations P/W 2 weeks of lateral right foot discomfort in the setting of OM of R 5th metatarsal with plan for debridement and graft with plan for OR Tomorrow

## 2022-12-29 NOTE — PRE-OP CHECKLIST - SELECT TESTS ORDERED
201/PT/PTT/INR/Type and Screen/POCT Blood Glucose/COVID-19 201/BMP/CBC/PT/PTT/INR/Type and Screen/POCT Blood Glucose/COVID-19

## 2022-12-29 NOTE — PROGRESS NOTE ADULT - PROBLEM SELECTOR PLAN 7
pH 7.24; pCO2 47, Bicarb 17, AG 11, RR 16  Metabolic acidosis with concomitant respiratory acidosis  note acidosis in 11/22 7.29 may be chronic in setting of CKD  Repeat VBG is WNL

## 2022-12-29 NOTE — DISCHARGE NOTE PROVIDER - NSDCCPCAREPLAN_GEN_ALL_CORE_FT
PRINCIPAL DISCHARGE DIAGNOSIS  Diagnosis: Wound of foot  Assessment and Plan of Treatment:       SECONDARY DISCHARGE DIAGNOSES  Diagnosis: Status post liver transplantation  Assessment and Plan of Treatment:      PRINCIPAL DISCHARGE DIAGNOSIS  Diagnosis: Wound of foot  Assessment and Plan of Treatment: You had an infection of the bone of your right foot, also known as osteomyelitis. You were started on an antibiotic called zosyn and the Right 5th digit was amputated to control the source of the infection.  Please complete your antibiotic course of Augmentin for __ Days and dress the amputation wound site according to instructions.   If you begin experiencing severly increased pain, purulent drainage, and/or fevers, please go to the hospital immediately.   Please follow up with an appointment with Dr. Carroll Wang within 1 week.       PRINCIPAL DISCHARGE DIAGNOSIS  Diagnosis: Wound of foot  Assessment and Plan of Treatment: You had an infection of the bone of your right foot, also known as osteomyelitis. You were started on an antibiotic called zosyn and the Right 5th digit was amputated to control the source of the infection.  Please complete your antibiotic course of Augmentin for 5 Days and dress the amputation wound site according to instructions.   If you begin experiencing severly increased pain, purulent drainage, and/or fevers, please go to the hospital immediately.   Please follow up with an appointment with Dr. Carroll Wang within 1 week.      SECONDARY DISCHARGE DIAGNOSES  Diagnosis: Transplanted liver  Assessment and Plan of Treatment: During your admission, you were evaluted by the transplant hepatology and the transplant infectious disease team. You were found to have elevated levels of a common virus known as Cytomegalovirus. This is commonly seen in transplanted patients who are on immunosuppressive therapy.   Please consult with your doctor if you begin to experience issues your digestive system such as nausea, recurrent diarrhea. Please return to the ED if you begin to have any issues with vision loss or confusion.   As a result, we are starting you on an antiviral medication for this virus and plan for you to follow up with your Transplant ID doctor in 2 weeks for further evaluation. Please attend this appointment and to help manage your long term health and liver transplant.     PRINCIPAL DISCHARGE DIAGNOSIS  Diagnosis: Wound of foot  Assessment and Plan of Treatment: You had an infection of the bone of your right foot, also known as osteomyelitis. You were started on an antibiotic called zosyn and the Right 5th digit was amputated to control the source of the infection.  Please complete your antibiotic course of Augmentin for 5 Days and dress the amputation wound site according to instructions.   If you begin experiencing severly increased pain, purulent drainage, and/or fevers, please go to the hospital immediately.   Please follow up with an appointment with Dr. Carroll Wang within 1 week.      SECONDARY DISCHARGE DIAGNOSES  Diagnosis: Transplanted liver  Assessment and Plan of Treatment: During your admission, you were evaluted by the transplant hepatology and the transplant infectious disease team. You were found to have elevated levels of a common virus known as Cytomegalovirus. This is commonly seen in transplanted patients who are on immunosuppressive therapy.   Please consult with your doctor if you begin to experience issues your digestive system such as nausea, recurrent diarrhea. Please return to the ED if you begin to have any issues with vision loss or confusion.   As a result, we are starting you on an antiviral medication for this virus and plan for you to follow up with your Transplant ID doctor in 2 weeks for further evaluation. Please attend this appointment and to help manage your long term health and liver transplant.  Please also follow up with Dr. Medina for further management of your Transplant medications.     PRINCIPAL DISCHARGE DIAGNOSIS  Diagnosis: Wound of foot  Assessment and Plan of Treatment: You had an infection of the bone of your right foot, also known as osteomyelitis. You were started on an antibiotic called zosyn and the Right 5th digit was amputated to control the source of the infection.  Please complete your antibiotic course of Ceftin for 14 days and dress the amputation wound site according to instructions.   If you begin experiencing severly increased pain, purulent drainage, and/or fevers, please go to the hospital immediately.   Please follow up with an appointment with Dr. Carroll Wang within 1 week.      SECONDARY DISCHARGE DIAGNOSES  Diagnosis: Transplanted liver  Assessment and Plan of Treatment: During your admission, you were evaluted by the transplant hepatology and the transplant infectious disease team. You were found to have elevated levels of a common virus known as Cytomegalovirus. This is commonly seen in transplanted patients who are on immunosuppressive therapy.   Please consult with your doctor if you begin to experience issues your digestive system such as nausea, recurrent diarrhea. Please return to the ED if you begin to have any issues with vision loss or confusion.   As a result, we are starting you on an antiviral medication for this virus and plan for you to follow up with your Transplant ID doctor in 2 weeks for further evaluation. Please attend this appointment and to help manage your long term health and liver transplant.  Please also follow up with Dr. Medina for further management of your Transplant medications.

## 2022-12-29 NOTE — PRE-ANESTHESIA EVALUATION ADULT - WEIGHT IN LBS
250 Pt p/w numbness x 3 days, likely peripheral neuropathy, leaning on ulnar nerve. very inconsistent exam. also consider radiculopathy, vascular abnormality. H&P not c/w stroke. Check XR, US. If neg, d/c w/ f/u PCP / Neuro

## 2022-12-29 NOTE — BRIEF OPERATIVE NOTE - NSICDXBRIEFPROCEDURE_GEN_ALL_CORE_FT
PROCEDURES:  Partial amputation of fifth ray of right foot by open approach 29-Dec-2022 12:00:18  Han Richards  Amputation of toe of right foot with application of graft 29-Dec-2022 12:04:50  Han Richards

## 2022-12-29 NOTE — DISCHARGE NOTE PROVIDER - NPI NUMBER (FOR SYSADMIN USE ONLY) :
[[id number] [[id number]],[[id number] [[id number]],[[id number]]

## 2022-12-29 NOTE — PRE-ANESTHESIA EVALUATION ADULT - NSANTHPMHFT_GEN_ALL_CORE
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

## 2022-12-29 NOTE — DISCHARGE NOTE PROVIDER - NSDCMRMEDTOKEN_GEN_ALL_CORE_FT
Admelog 100 units/mL injectable solution: 14 unit(s) subcutaneous 3 times a day (before meals)  amoxicillin-clavulanate 875 mg-125 mg oral tablet: 1 tab(s) orally 2 times a day   Everolimus 1 MG Oral Tablet:   folic acid 1 mg oral tablet: 1 tab(s) orally once a day  Iron 325 (65 Fe) MG Oral Tablet:   Lantus 100 units/mL subcutaneous solution: 14 unit(s) subcutaneous once a day (at bedtime)  mycophenolate mofetil 250 mg oral capsule: 500  orally 2 times a day  Mycophenolate Mofetil 250 MG Oral Capsule:   omega-3 polyunsaturated fatty acids ethyl esters 1000 mg oral capsule: 1 cap(s) orally 2 times a day  pantoprazole 40 mg oral delayed release tablet: 1 tab(s) orally once a day (before a meal)  predniSONE 20 mg oral tablet: 1 tab(s) orally once a day  senna leaf extract oral tablet: 2 tab(s) orally once a day (at bedtime), As needed, Constipation  sertraline 100 mg oral tablet: 1 tab(s) orally every 24 hours   Admelog 100 units/mL injectable solution: 14 unit(s) subcutaneous 3 times a day (before meals)  amoxicillin-clavulanate 875 mg-125 mg oral tablet: 1 tab(s) orally 2 times a day   Everolimus 1 MG Oral Tablet:   folic acid 1 mg oral tablet: 1 tab(s) orally once a day  Lantus 100 units/mL subcutaneous solution: 14 unit(s) subcutaneous once a day (at bedtime)  mycophenolate mofetil 250 mg oral capsule: 500  orally 2 times a day  omega-3 polyunsaturated fatty acids ethyl esters 1000 mg oral capsule: 1 cap(s) orally 2 times a day  pantoprazole 40 mg oral delayed release tablet: 1 tab(s) orally once a day (before a meal)  predniSONE 20 mg oral tablet: 1 tab(s) orally once a day  senna leaf extract oral tablet: 2 tab(s) orally once a day (at bedtime), As needed, Constipation  sertraline 100 mg oral tablet: 1 tab(s) orally every 24 hours   Admelog 100 units/mL injectable solution: 14 unit(s) subcutaneous 3 times a day (before meals)  Everolimus 1 MG Oral Tablet:   folic acid 1 mg oral tablet: 1 tab(s) orally once a day  Lantus 100 units/mL subcutaneous solution: 14 unit(s) subcutaneous once a day (at bedtime)  mycophenolate mofetil 250 mg oral capsule: 500  orally 2 times a day  omega-3 polyunsaturated fatty acids ethyl esters 1000 mg oral capsule: 1 cap(s) orally 2 times a day  pantoprazole 40 mg oral delayed release tablet: 1 tab(s) orally once a day (before a meal)  predniSONE 20 mg oral tablet: 1 tab(s) orally once a day  senna leaf extract oral tablet: 2 tab(s) orally once a day (at bedtime), As needed, Constipation  sertraline 100 mg oral tablet: 1 tab(s) orally every 24 hours   Admelog 100 units/mL injectable solution: 14 unit(s) subcutaneous 3 times a day (before meals)  amoxicillin-clavulanate 875 mg-125 mg oral tablet: 1 tab(s) orally 2 times a day   folic acid 1 mg oral tablet: 1 tab(s) orally once a day  Lantus 100 units/mL subcutaneous solution: 14 unit(s) subcutaneous once a day (at bedtime)  mycophenolate mofetil 500 mg oral tablet: 1 tab(s) orally 2 times a day   omega-3 polyunsaturated fatty acids ethyl esters 1000 mg oral capsule: 1 cap(s) orally 2 times a day  pantoprazole 40 mg oral delayed release tablet: 1 tab(s) orally once a day (before a meal)  predniSONE 20 mg oral tablet: 1 tab(s) orally once a day  senna leaf extract oral tablet: 2 tab(s) orally once a day (at bedtime), As needed, Constipation  sertraline 100 mg oral tablet: 1 tab(s) orally every 24 hours  valGANciclovir 450 mg oral tablet: 1 tab(s) orally every 12 hours  Wound Care Orders: Please apply wound vac with black foam to the R lateral foot graft @75mmHg 3x a week, thank you!    If Wound Vac Fails, please apply Wet to Dry   Admelog 100 units/mL injectable solution: 14 unit(s) subcutaneous 3 times a day (before meals)  folic acid 1 mg oral tablet: 1 tab(s) orally once a day  Lantus 100 units/mL subcutaneous solution: 14 unit(s) subcutaneous once a day (at bedtime)  mycophenolate mofetil 500 mg oral tablet: 1 tab(s) orally 2 times a day   omega-3 polyunsaturated fatty acids ethyl esters 1000 mg oral capsule: 1 cap(s) orally 2 times a day  pantoprazole 40 mg oral delayed release tablet: 1 tab(s) orally once a day (before a meal)  predniSONE 20 mg oral tablet: 1 tab(s) orally once a day  senna leaf extract oral tablet: 2 tab(s) orally once a day (at bedtime), As needed, Constipation  sertraline 100 mg oral tablet: 1 tab(s) orally every 24 hours  valGANciclovir 450 mg oral tablet: 1 tab(s) orally every 12 hours  Wound Care Orders: Please apply wound vac with black foam to the R lateral foot graft @75mmHg 3x a week, thank you!    If Wound Vac Fails, please apply Wet to Dry   Admelog 100 units/mL injectable solution: 14 unit(s) subcutaneous 3 times a day (before meals)  cefuroxime 500 mg oral tablet: 1 tab(s) orally 2 times a day   folic acid 1 mg oral tablet: 1 tab(s) orally once a day  Lantus 100 units/mL subcutaneous solution: 14 unit(s) subcutaneous once a day (at bedtime)  mycophenolate mofetil 500 mg oral tablet: 1 tab(s) orally 2 times a day   omega-3 polyunsaturated fatty acids ethyl esters 1000 mg oral capsule: 1 cap(s) orally 2 times a day  pantoprazole 40 mg oral delayed release tablet: 1 tab(s) orally once a day (before a meal)  predniSONE 20 mg oral tablet: 1 tab(s) orally once a day  senna leaf extract oral tablet: 2 tab(s) orally once a day (at bedtime), As needed, Constipation  sertraline 100 mg oral tablet: 1 tab(s) orally every 24 hours  valGANciclovir 450 mg oral tablet: 1 tab(s) orally every 12 hours  Wound Care Orders: Please apply wound vac with black foam to the R lateral foot graft @75mmHg 3x a week, thank you!    If Wound Vac Fails, please apply Wet to Dry

## 2022-12-30 NOTE — PROGRESS NOTE ADULT - PROBLEM SELECTOR PLAN 7
pH 7.24; pCO2 47, Bicarb 17, AG 11, RR 16  Metabolic acidosis with concomitant respiratory acidosis  note acidosis in 11/22 7.29 may be chronic in setting of CKD  Repeat VBG is WNL -C/W Home tamsulosin 0.4mg

## 2022-12-30 NOTE — PHYSICAL THERAPY INITIAL EVALUATION ADULT - DIAGNOSIS, PT EVAL
Pt with decreased strength, endurance and balance leading to moderate impairment in functional mobility. Pt with decreased strength, endurance and balance leading to moderate impairment in functional mobility/integumentary impairments

## 2022-12-30 NOTE — PROGRESS NOTE ADULT - SUBJECTIVE AND OBJECTIVE BOX
Follow Up:      Interval History:    REVIEW OF SYSTEMS  [  ] ROS unobtainable because:    [  ] All other systems negative except as noted below    Constitutional:  [ ] fever [ ] chills  [ ] weight loss  [ ] weakness  Skin:  [ ] rash [ ] phlebitis	  Eyes: [ ] icterus [ ] pain  [ ] discharge	  ENMT: [ ] sore throat  [ ] thrush [ ] ulcers [ ] exudates  Respiratory: [ ] dyspnea [ ] hemoptysis [ ] cough [ ] sputum	  Cardiovascular:  [ ] chest pain [ ] palpitations [ ] edema	  Gastrointestinal:  [ ] nausea [ ] vomiting [ ] diarrhea [ ] constipation [ ] pain	  Genitourinary:  [ ] dysuria [ ] frequency [ ] hematuria [ ] discharge [ ] flank pain  [ ] incontinence  Musculoskeletal:  [ ] myalgias [ ] arthralgias [ ] arthritis  [ ] back pain  Neurological:  [ ] headache [ ] seizures  [ ] confusion/altered mental status    Allergies  codeine (Anaphylaxis)        ANTIMICROBIALS:  piperacillin/tazobactam IVPB.. 3.375 every 8 hours  valGANciclovir 450 every 12 hours      OTHER MEDS:  MEDICATIONS  (STANDING):  acetaminophen     Tablet .. 650 every 6 hours PRN  glucagon  Injectable 1 once  heparin   Injectable 5000 every 8 hours  insulin glargine Injectable (LANTUS) 10 at bedtime  insulin lispro (ADMELOG) corrective regimen sliding scale  three times a day before meals  morphine  - Injectable 2 every 4 hours PRN  mycophenolate mofetil 250 two times a day  pantoprazole    Tablet 40 before breakfast  polyethylene glycol 3350 17 daily PRN  predniSONE   Tablet 20 daily  senna 2 at bedtime  sertraline 100 daily  tamsulosin 0.4 at bedtime      Vital Signs Last 24 Hrs  T(C): 37.2 (30 Dec 2022 15:26), Max: 37.2 (30 Dec 2022 15:26)  T(F): 98.9 (30 Dec 2022 15:26), Max: 98.9 (30 Dec 2022 15:26)  HR: 78 (30 Dec 2022 15:26) (68 - 78)  BP: 130/66 (30 Dec 2022 15:26) (117/58 - 144/71)  BP(mean): --  RR: 18 (30 Dec 2022 15:26) (18 - 18)  SpO2: 98% (30 Dec 2022 15:26) (94% - 98%)    Parameters below as of 30 Dec 2022 15:26  Patient On (Oxygen Delivery Method): room air        PHYSICAL EXAMINATION:  General: Alert and Awake, NAD  HEENT: PERRL, EOMI  Neck: Supple  Cardiac: RRR, No M/R/G  Resp: CTAB, No Wh/Rh/Ra  Abdomen: NBS, NT/ND, No HSM, No rigidity or guarding  MSK: No LE edema. No Calf tenderness  : No bob  Skin: No rashes or lesions. Skin is warm and dry to the touch.   Neuro: Alert and Awake. CN 2-12 Grossly intact. Moves all four extremities spontaneously.  Psych: Calm, Pleasant, Cooperative                          10.2   5.87  )-----------( 188      ( 30 Dec 2022 06:57 )             31.8       12-30    137  |  107  |  32<H>  ----------------------------<  199<H>  4.3   |  22  |  1.89<H>    Ca    8.6      30 Dec 2022 06:55            MICROBIOLOGY:  v  .Tissue right foot 5th metatarsal  12-29-22   No growth  --    No polymorphonuclear cells seen per low power field  No organisms seen per oil power field      .Abscess right foot  12-26-22   Rare Coag Negative Staphylococcus  Few Corynebacterium species  "Susceptibilities not performed"  --  --      .Blood Blood  12-26-22   No growth to date.  --  --      .Blood Blood  12-26-22   No growth to date.  --  --          CMVPCR Log: 3.19 Zmn49ZF/mL (12-29 @ 06:21)        RADIOLOGY:    <The imaging below has been reviewed and visualized by me independently. Findings as detailed in report below> Follow Up:  Foot OM    Interval History: afebrile. no acute events.     REVIEW OF SYSTEMS  [  ] ROS unobtainable because:    [ x ] All other systems negative except as noted below    Constitutional:  [ ] fever [ ] chills  [ ] weight loss  [ ] weakness  Skin:  [ ] rash [ ] phlebitis	  Eyes: [ ] icterus [ ] pain  [ ] discharge	  ENMT: [ ] sore throat  [ ] thrush [ ] ulcers [ ] exudates  Respiratory: [ ] dyspnea [ ] hemoptysis [ ] cough [ ] sputum	  Cardiovascular:  [ ] chest pain [ ] palpitations [ ] edema	  Gastrointestinal:  [ ] nausea [ ] vomiting [ ] diarrhea [ ] constipation [ ] pain	  Genitourinary:  [ ] dysuria [ ] frequency [ ] hematuria [ ] discharge [ ] flank pain  [ ] incontinence  Musculoskeletal:  [ ] myalgias [ ] arthralgias [ ] arthritis  [ ] back pain +RLE Foot Wound  Neurological:  [ ] headache [ ] seizures  [ ] confusion/altered mental status      Allergies  codeine (Anaphylaxis)        ANTIMICROBIALS:  piperacillin/tazobactam IVPB.. 3.375 every 8 hours  valGANciclovir 450 every 12 hours      OTHER MEDS:  MEDICATIONS  (STANDING):  acetaminophen     Tablet .. 650 every 6 hours PRN  glucagon  Injectable 1 once  heparin   Injectable 5000 every 8 hours  insulin glargine Injectable (LANTUS) 10 at bedtime  insulin lispro (ADMELOG) corrective regimen sliding scale  three times a day before meals  morphine  - Injectable 2 every 4 hours PRN  mycophenolate mofetil 250 two times a day  pantoprazole    Tablet 40 before breakfast  polyethylene glycol 3350 17 daily PRN  predniSONE   Tablet 20 daily  senna 2 at bedtime  sertraline 100 daily  tamsulosin 0.4 at bedtime      Vital Signs Last 24 Hrs  T(C): 37.2 (30 Dec 2022 15:26), Max: 37.2 (30 Dec 2022 15:26)  T(F): 98.9 (30 Dec 2022 15:26), Max: 98.9 (30 Dec 2022 15:26)  HR: 78 (30 Dec 2022 15:26) (68 - 78)  BP: 130/66 (30 Dec 2022 15:26) (117/58 - 144/71)  BP(mean): --  RR: 18 (30 Dec 2022 15:26) (18 - 18)  SpO2: 98% (30 Dec 2022 15:26) (94% - 98%)    Parameters below as of 30 Dec 2022 15:26  Patient On (Oxygen Delivery Method): room air    PHYSICAL EXAMINATION:  General: Alert and Awake, NAD  HEENT: Normocephalic / Atraumatic  Resp: No accessory muscles of respiration utilized  Abdomen: Not distended.  MSK: +RLE 5th metatarsal wound with necrotic wound bed; surrounding erythema and warmth (improving)  : No bob  Skin: No rashes or lesions.    Neuro: Alert and Awake. CN 2-12 Grossly intact. Moves all four extremities spontaneously.  Psych: Calm, Pleasant, Cooperative                              10.2   5.87  )-----------( 188      ( 30 Dec 2022 06:57 )             31.8       12-30    137  |  107  |  32<H>  ----------------------------<  199<H>  4.3   |  22  |  1.89<H>    Ca    8.6      30 Dec 2022 06:55            MICROBIOLOGY:  v  .Tissue right foot 5th metatarsal  12-29-22   No growth  --    No polymorphonuclear cells seen per low power field  No organisms seen per oil power field      .Abscess right foot  12-26-22   Rare Coag Negative Staphylococcus  Few Corynebacterium species  "Susceptibilities not performed"  --  --      .Blood Blood  12-26-22   No growth to date.  --  --      .Blood Blood  12-26-22   No growth to date.  --  --          CMVPCR Log: 3.19 Nlk98DP/mL (12-29 @ 06:21)        RADIOLOGY:    <The imaging below has been reviewed and visualized by me independently. Findings as detailed in report below>    < from: Xray Foot AP + Lateral + Oblique, Right (12.29.22 @ 12:07) >  IMPRESSION:  Status post subtotal 5th ray resection through metatarsal base with sharp   smooth osseous stump margin and with postsurgical changes in the   overlying soft tissues. Ovoid configuration of staples over stump region   may reflect concurrent skin graft placement.    Stable previously seen partial 1st and 2nd ray amputation defects.    No proximally tracking gas collections beyond the amputation margins and   no focal areas of osteomyelitis.    Remainder of foot unchanged. Also correlate with intraoperative findings.    < end of copied text >

## 2022-12-30 NOTE — PROGRESS NOTE ADULT - PROBLEM SELECTOR PLAN 4
Echo in 11/22  EF: 61, mild AS, normal LVSF, mild diastolic dysfunction stage 1, normal RVSF, mild TR  Marked lower extremity edema, no crackles   -Denies recent lasix usage A1C in november 6.7  Hx Diabetic foot ulcer, neuropathy, and CKD  notes occasional home glucose over 300 but usually low-mid 100s  At home takes Humalog 9U TID w/ meals and Lantus 14 units at night  -hold Humalog, can resume pending ISS  -Lantus 10U qHS for now  -ISS

## 2022-12-30 NOTE — PHYSICAL THERAPY INITIAL EVALUATION ADULT - MODALITIES TREATMENT COMMENTS
Pt tolerated VAC application to R lateral foot wound well. 66yo M PMHx HTN, IDDM, diabetic foot ulcers, CKD, HFpEF, neuropathy, PUD, nephrolithiasis, JEAN cirrhosis s/p OLT 2020 VRE bacteremia, CMV viremia, lower extremity edema, and hx multiple toe amputations. Pt s/p R foot partial 5th ray amputation with keracis graft application on 22, RLE NWB. Spoke with Podiatry Resident Ricardo Tracy earlier this AM, patient cleared for VAC application at this time. Pt received sitting up in bedside chair in NAD, +IVF, +DSD R foot, agreeable to VAC application at this time. Dressing removed, wound received C/D/I with no erythema, no purulence, no malodor. Wound measurin.5cm x 2.0cm x 0.3cm, wound base not well visualized due to adaptic stapled ontop kerecis graft. Appears red with minimal sero-sanguinous drainage.

## 2022-12-30 NOTE — PROGRESS NOTE ADULT - ASSESSMENT
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

## 2022-12-30 NOTE — PHYSICAL THERAPY INITIAL EVALUATION ADULT - PERTINENT HX OF CURRENT PROBLEM, REHAB EVAL
67M PMHx HTN, IDDM, diabetic foot ulcers, CKD, HFpEF, neuropathy, PUD, nephrolithiasis, JEAN cirrhosis s/p OLT 7/2/2020 VRE bacteremia, CMV viremia, lower extremity edema, and hx multiple toe amputations. Pt s/p R foot partial 5th ray amputation with keracis graft application on 12/29/22, RLE NWB.

## 2022-12-30 NOTE — PHYSICAL THERAPY INITIAL EVALUATION ADULT - NSPTDMEREC_GEN_A_CORE
If home, pt states he has ramp entrance in back of home to enter house, Patient states that he ambulates independently with a cane or walker; he also owns a shower bench, standard wheelchair and a commode (he does not use the commode).

## 2022-12-30 NOTE — CHART NOTE - NSCHARTNOTEFT_GEN_A_CORE
Wound Care Team Note:    Request for wound care consult for foot wounds received. Wound care consult referred to Wound Care Team Podiatrists, Guerita Donato/Kathleen/Jazmín. Will defer to podiatry for management. Please refer any questions/concerns to Podiatry. Will not follow.    Tami Monroe, [id number], CWOCN [id number]

## 2022-12-30 NOTE — PHYSICAL THERAPY INITIAL EVALUATION ADULT - NSPTDISCHREC_GEN_A_CORE
Subacute Rehab; if home, pt would require assist with ALL ADL's and functional mobility and home PT. Pt would require assistance to get into home.

## 2022-12-30 NOTE — PROGRESS NOTE ADULT - ASSESSMENT
67 years old male with HTN, IDDM (A1C 6.7 in 11/2022), HF, DM neuropathy, PUD, DVT, JEAN cirrhosis s/p OLT 7/2/2020, hx CMV viremia, hx VRE bacteremia, multiple R foot wounds/resections, presented with right foot discomfort.    ID is consulted for R foot OM  Recently had partial amputation of 2nd digit 11/9, low concern for residual infection, low concern of viability; D/C course of Augmentin x 5 days post-op  Afebrile, no leukocytosis  ESR 99, CRP 38, similar to 11/2022  R foot xray showed erosive changes at the distal aspect of the fifth right metatarsal and the proximal aspect of the fifth proximal phalanx at the MTP joint  S/p bedside I&D  MRSA/MSSA PCR (12/27) Negative    On 12/29 s/p R foot partial 5th ray amputation with keracis graft application  Per Podiatry low concern for residual infection    #RLE Cellulitis, RLE Foot OM, Positive Culture Finding (Coag Neg Staph, Corynebacterium)  Favor superificial findings reflective of procurement contaminant  --Continue Zosyn while admitted; If no growth on clean bone cultures by tomorrow would discharge on Augmentin 875 mg PO Q12H with end date 1/4/23  --Follow up on clean bone cultures  --Follow up on clean bone pathology    #Liver Transplant Recipient, CMV Viremia  --Recommend Valcyte 450 mg PO Q12H (Ordered)  --Patient should follow up with me in the Infectious Diseases Office at [address] [place] [address] in 2 weeks time for repeat CMV testing and clinical followup. Office contact number is [phone]    I will be away over this upcoming weekend. Please contact the Infectious Diseases Office with any further questions or concerns.     Eusebio Pérez M.D.  [facility] Division of Infectious Disease  8AM-5PM Monday - Friday: Available on [employer] Teams  After Hours and Holidays (or if no response on Microsoft Teams): Please contact the Infectious Diseases Office at [phone]     The above assessment and plan were discussed with Dr Navarrete

## 2022-12-30 NOTE — PHYSICAL THERAPY INITIAL EVALUATION ADULT - CRITERIA FOR SKILLED THERAPEUTIC INTERVENTIONS
impairments found Cleansed with NS, pad dry with gauze, cavilon to periwound, black foam ontop tracked to R anterolateral shin with good seal noted 75mmHg continuous, wrapped with jen and secured with tape, ACE wrap applied from forefoot to proximal calf in figure-8 fashion with 50% tension/50% overlap. LE digits 1-5 with <3 sec cap refill after ACE application. Pt left as found in NAD, call bell/possessions in reach all needs met and lines intact, 5P's addressed, VSS, RN Aminata aware of VAC application. [employer] forms completed in chart, SHIRIN Merritt aware./impairments found/rehab potential/therapy frequency/predicted duration of therapy intervention/anticipated equipment needs at discharge/anticipated discharge recommendation

## 2022-12-30 NOTE — PHYSICAL THERAPY INITIAL EVALUATION ADULT - ADDITIONAL COMMENTS
Pt states that he lives in a private house with his 3 adult children; there are 3 steps to enter house. Patient states that he ambulates independently with a cane or walker; he also owns a shower bench and a commode (he does not use the commode). Patient was not receiving any home nursing/home PT/HHA services prior to admission. Pt states that he lives in a private house with his 3 adult children; there are 3 steps to enter house, pt also has ramp entrance through back of house. Patient states that he ambulates independently with a cane or walker; he also owns a shower bench, standard wheelchair and a commode (he does not use the commode). Patient was not receiving any home nursing/home PT/HHA services prior to admission.

## 2022-12-30 NOTE — PROGRESS NOTE ADULT - SUBJECTIVE AND OBJECTIVE BOX
Patient is a 67y old  Male who presents with a chief complaint of C/F Foot osteomyelitis (30 Dec 2022 06:57)       INTERVAL HPI/OVERNIGHT EVENTS:  Patient seen and evaluated at bedside.  Pt is resting comfortable in NAD. Denies N/V/F/C.  Pain rated at X/10    Allergies    codeine (Anaphylaxis)    Intolerances        Vital Signs Last 24 Hrs  T(C): 36.7 (29 Dec 2022 23:57), Max: 36.7 (29 Dec 2022 23:57)  T(F): 98 (29 Dec 2022 23:57), Max: 98 (29 Dec 2022 23:57)  HR: 71 (30 Dec 2022 09:27) (62 - 71)  BP: 127/67 (30 Dec 2022 09:27) (112/63 - 156/72)  BP(mean): 100 (29 Dec 2022 13:15) (83 - 103)  RR: 18 (29 Dec 2022 23:57) (16 - 18)  SpO2: 94% (30 Dec 2022 09:27) (94% - 99%)    Parameters below as of 30 Dec 2022 09:27  Patient On (Oxygen Delivery Method): room air        LABS:                        10.2   5.87  )-----------( 188      ( 30 Dec 2022 06:57 )             31.8     12-30    137  |  107  |  32<H>  ----------------------------<  199<H>  4.3   |  22  |  1.89<H>    Ca    8.6      30 Dec 2022 06:55      PT/INR - ( 29 Dec 2022 06:21 )   PT: 11.9 sec;   INR: 1.03 ratio             CAPILLARY BLOOD GLUCOSE      POCT Blood Glucose.: 179 mg/dL (30 Dec 2022 07:53)  POCT Blood Glucose.: 299 mg/dL (29 Dec 2022 21:47)  POCT Blood Glucose.: 265 mg/dL (29 Dec 2022 17:00)  POCT Blood Glucose.: 206 mg/dL (29 Dec 2022 12:14)      Lower Extremity Physical Exam:  Neuro: Epicritic sensation diminished to the level of toes, B/L.  Musculoskeletal/Ortho: s/p  Right foot partial first and second partial amputation healed  Skin: s/p right foot partial 5th ray amputation  w kerecis graft application, graft intact, adaptic intact, no drainage, no seroma, no erythema, no clinical signs of infection. Left foot with no acute signs of infection.     RADIOLOGY & ADDITIONAL TESTS:

## 2022-12-30 NOTE — PROGRESS NOTE ADULT - PROBLEM SELECTOR PLAN 1
XR shows signs concerning for OM;   Podiatry plans for right foot partial fifth ray amputation with right foot debridement and possible graft application   Patient currently Afebrile, ESR 99; CRP 38    S/P Vanc and cefepime and 1 bolus in ED  -F/U right wound culture; NGTD THus far  -F/U BLood Cx: NGTD Thus far  Appreciate ID Recs  -Zosyn 3.375 q8h    Appreciate Transplant Hepatology Recs:  -Hold Everolimus  -Continue Mycophenolate 250mg BID  -debridement with podiatry on 12/29, pending bone culture XR shows signs concerning for OM;   Podiatry plans for right foot partial fifth ray amputation with right foot debridement and possible graft application   Patient currently Afebrile, ESR 99; CRP 38    S/P Vanc and cefepime and 1 bolus in ED  right wound culture 12/26; Rare coag negative staph  BLood Cx 12/26: NGTD  F/U bone culture 12/29  Appreciate ID Recs  -Zosyn 3.375 q8h    Appreciate Transplant Hepatology Recs:  -Hold Everolimus  -Continue Mycophenolate 250mg BID S/P Right 5th ray amputation; no signficant pain or continued bleeding  F/U bone culture 12/29    Appreciate ID Recs  -Zosyn 3.375 q8h  -Valgancyclovir 450mg q12h iso elevated CMV levels    Appreciate Transplant Hepatology Recs:  -Hold Everolimus  -Continue Mycophenolate 250mg BID

## 2022-12-30 NOTE — PROGRESS NOTE ADULT - SUBJECTIVE AND OBJECTIVE BOX
INCOMPLETE NOTE    Luis Manuel Govea | PGY1| Pager: [phone]  Interval Events:    REVIEW OF SYSTEMS:  CONSTITUTIONAL: No weakness, fevers or chills  EYES/ENT: No visual changes;  No vertigo or throat pain   NECK: No pain or stiffness  RESPIRATORY: No cough, wheezing, hemoptysis; No shortness of breath  CARDIOVASCULAR: No chest pain or palpitations  GASTROINTESTINAL: No abdominal or epigastric pain. No nausea, vomiting, or hematemesis; No diarrhea or constipation. No melena or hematochezia.  GENITOURINARY: No dysuria, frequency or hematuria  NEUROLOGICAL: No numbness or weakness  SKIN: No itching, burning, rashes, or lesions   All other review of systems is negative unless indicated above.    OBJECTIVE:  ICU Vital Signs Last 24 Hrs  T(C): 36.7 (29 Dec 2022 23:57), Max: 36.7 (29 Dec 2022 23:57)  T(F): 98 (29 Dec 2022 23:57), Max: 98 (29 Dec 2022 23:57)  HR: 69 (29 Dec 2022 23:57) (62 - 70)  BP: 144/71 (29 Dec 2022 23:57) (112/63 - 156/72)  BP(mean): 100 (29 Dec 2022 13:15) (83 - 103)  ABP: --  ABP(mean): --  RR: 18 (29 Dec 2022 23:57) (16 - 18)  SpO2: 94% (29 Dec 2022 23:57) (94% - 99%)    O2 Parameters below as of 29 Dec 2022 23:57  Patient On (Oxygen Delivery Method): room air              12-28 @ 07:01 - 12-29 @ 07:00  --------------------------------------------------------  IN: 1440 mL / OUT: 2250 mL / NET: -810 mL    12-29 @ 07:01  -  12-30 @ 06:57  --------------------------------------------------------  IN: 940 mL / OUT: 900 mL / NET: 40 mL      CAPILLARY BLOOD GLUCOSE      POCT Blood Glucose.: 299 mg/dL (29 Dec 2022 21:47)      PHYSICAL EXAM:  General: WN/WD NAD  Neurology: A&Ox3, nonfocal, TORREZ x 4  Eyes: PERRLA/ EOMI, Gross vision intact  ENT/Neck: Neck supple, trachea midline, No JVD, Gross hearing intact  Respiratory: CTA B/L, No wheezing, rales, rhonchi  CV: RRR, +S1/S2, -S3/S4, no murmurs, rubs or gallops  Abdominal: Soft, NT, ND +BS, No HSM  MSK: 5/5 strength UE/LE bilaterally  Extremities: No edema, 2+ peripheral pulses  Skin: No Rashes, Hematoma, Ecchymosis  Incisions:   Tubes:    HOSPITAL MEDICATIONS:  MEDICATIONS  (STANDING):  dextrose 5%. 1000 milliLiter(s) (50 mL/Hr) IV Continuous <Continuous>  dextrose 5%. 1000 milliLiter(s) (100 mL/Hr) IV Continuous <Continuous>  folic acid 1 milliGRAM(s) Oral daily  glucagon  Injectable 1 milliGRAM(s) IntraMuscular once  heparin   Injectable 5000 Unit(s) SubCutaneous every 8 hours  insulin lispro (ADMELOG) corrective regimen sliding scale   SubCutaneous three times a day before meals  pantoprazole    Tablet 40 milliGRAM(s) Oral before breakfast  piperacillin/tazobactam IVPB.. 3.375 Gram(s) IV Intermittent every 8 hours  predniSONE   Tablet 20 milliGRAM(s) Oral daily  senna 2 Tablet(s) Oral at bedtime  sertraline 100 milliGRAM(s) Oral daily  tamsulosin 0.4 milliGRAM(s) Oral at bedtime    MEDICATIONS  (PRN):  acetaminophen     Tablet .. 650 milliGRAM(s) Oral every 6 hours PRN Mild Pain (1 - 3)  morphine  - Injectable 2 milliGRAM(s) IV Push every 4 hours PRN Severe Pain (7 - 10)  polyethylene glycol 3350 17 Gram(s) Oral daily PRN Constipation      LABS:                        10.6   5.79  )-----------( 200      ( 29 Dec 2022 06:21 )             32.9     Hgb Trend: 10.6<--, 10.3<--, 11.0<--, 11.2<--  12-29    137  |  106  |  34<H>  ----------------------------<  245<H>  4.4   |  21<L>  |  1.86<H>    Ca    9.0      29 Dec 2022 06:21      Creatinine Trend: 1.86<--, 1.86<--, 1.56<--, 1.60<--  PT/INR - ( 29 Dec 2022 06:21 )   PT: 11.9 sec;   INR: 1.03 ratio                   MICROBIOLOGY:     Culture - Tissue with Gram Stain (collected 29 Dec 2022 13:56)  Source: .Tissue right foot 5th metatarsal  Gram Stain (29 Dec 2022 23:00):    No polymorphonuclear cells seen per low power field    No organisms seen per oil power field       Luis Manuel Govea | PGY1| Pager: [phone]  Interval Events: AFebrile overnight    REVIEW OF SYSTEMS:  CONSTITUTIONAL: No weakness, fevers or chills  RESPIRATORY: No cough, wheezing, hemoptysis; No shortness of breath  CARDIOVASCULAR: No chest pain or palpitations  GASTROINTESTINAL: No abdominal or epigastric pain. No nausea, vomiting, or hematemesis; No diarrhea or constipation. No melena or hematochezia.  NEUROLOGICAL: No numbness or weakness  SKIN: No itching, burning, rashes, or lesions   All other review of systems is negative unless indicated above.    OBJECTIVE:  ICU Vital Signs Last 24 Hrs  T(C): 36.7 (29 Dec 2022 23:57), Max: 36.7 (29 Dec 2022 23:57)  T(F): 98 (29 Dec 2022 23:57), Max: 98 (29 Dec 2022 23:57)  HR: 69 (29 Dec 2022 23:57) (62 - 70)  BP: 144/71 (29 Dec 2022 23:57) (112/63 - 156/72)  BP(mean): 100 (29 Dec 2022 13:15) (83 - 103)  ABP: --  ABP(mean): --  RR: 18 (29 Dec 2022 23:57) (16 - 18)  SpO2: 94% (29 Dec 2022 23:57) (94% - 99%)    O2 Parameters below as of 29 Dec 2022 23:57  Patient On (Oxygen Delivery Method): room air              12-28 @ 07:01 - 12-29 @ 07:00  --------------------------------------------------------  IN: 1440 mL / OUT: 2250 mL / NET: -810 mL    12-29 @ 07:01  -  12-30 @ 06:57  --------------------------------------------------------  IN: 940 mL / OUT: 900 mL / NET: 40 mL      CAPILLARY BLOOD GLUCOSE      POCT Blood Glucose.: 299 mg/dL (29 Dec 2022 21:47)      PHYSICAL EXAM:  General: WN/WD NAD  Neurology: A&Ox3, nonfocal, TORREZ x 4  Eyes: PERRLA/ EOMI, Gross vision intact  Respiratory: CTA B/L, No wheezing, rales, rhonchi  CV: RRR, +S1/S2, -S3/S4, Holosystolic murmur near 3rd parasternal space bilaterally  Abdominal: Soft, NT, ND +BS,   Extremities: Dressing clean dry intact; pulses 2+  Skin: No Rashes, Hematoma, Ecchymosis  Incisions: Clean, dry intact dressing      HOSPITAL MEDICATIONS:  MEDICATIONS  (STANDING):  dextrose 5%. 1000 milliLiter(s) (50 mL/Hr) IV Continuous <Continuous>  dextrose 5%. 1000 milliLiter(s) (100 mL/Hr) IV Continuous <Continuous>  folic acid 1 milliGRAM(s) Oral daily  glucagon  Injectable 1 milliGRAM(s) IntraMuscular once  heparin   Injectable 5000 Unit(s) SubCutaneous every 8 hours  insulin lispro (ADMELOG) corrective regimen sliding scale   SubCutaneous three times a day before meals  pantoprazole    Tablet 40 milliGRAM(s) Oral before breakfast  piperacillin/tazobactam IVPB.. 3.375 Gram(s) IV Intermittent every 8 hours  predniSONE   Tablet 20 milliGRAM(s) Oral daily  senna 2 Tablet(s) Oral at bedtime  sertraline 100 milliGRAM(s) Oral daily  tamsulosin 0.4 milliGRAM(s) Oral at bedtime    MEDICATIONS  (PRN):  acetaminophen     Tablet .. 650 milliGRAM(s) Oral every 6 hours PRN Mild Pain (1 - 3)  morphine  - Injectable 2 milliGRAM(s) IV Push every 4 hours PRN Severe Pain (7 - 10)  polyethylene glycol 3350 17 Gram(s) Oral daily PRN Constipation      LABS:                        10.6   5.79  )-----------( 200      ( 29 Dec 2022 06:21 )             32.9     Hgb Trend: 10.6<--, 10.3<--, 11.0<--, 11.2<--  12-29    137  |  106  |  34<H>  ----------------------------<  245<H>  4.4   |  21<L>  |  1.86<H>    Ca    9.0      29 Dec 2022 06:21      Creatinine Trend: 1.86<--, 1.86<--, 1.56<--, 1.60<--  PT/INR - ( 29 Dec 2022 06:21 )   PT: 11.9 sec;   INR: 1.03 ratio                   MICROBIOLOGY:     Culture - Tissue with Gram Stain (collected 29 Dec 2022 13:56)  Source: .Tissue right foot 5th metatarsal  Gram Stain (29 Dec 2022 23:00):    No polymorphonuclear cells seen per low power field    No organisms seen per oil power field

## 2022-12-30 NOTE — PROGRESS NOTE ADULT - PROBLEM SELECTOR PLAN 8
-C/W Home tamsulosin 0.4mg Diet: Consistent carb  DVT: Heparin SQH; no Eliquis at home  GI: c/w home protonix, c/w 2 senna  Dispo: Home with Home PT

## 2022-12-30 NOTE — PROGRESS NOTE ADULT - PROBLEM SELECTOR PLAN 3
Preop evaluation for podiatry surgery (partial 5th ray amputation w/ R foot debridement and possible graft application):    - RCRI score of 2 points (Class III Risk) indicating 10.1% 30-day risk of death, MI, or cardiac arrest  - Admission ECG was NSR without signs of acute ischemia; Recent TTE (Nov 2022) with mild diastolic dysfunction and normal EF  - Exam with pitting edema in b/l LEs (L>R, pt reports chronic), but overall does not appear to be in CHF exacerbation  - No prior adverse reaction with anesthesia  - Given pt's comorbidities, pt is at elevated risk for low-intermediate risk surgery  - In setting of prior DVT (1/2022), pt reporting he is not longer on AC, and significant b/l LE swelling, would obtain LE duplex to r/o new DVT and also repeat blood gas for pt's metabolic acidosis prior to proceeding with surgery given that it may change danilo-surgery management. Echo in 11/22  EF: 61, mild AS, normal LVSF, mild diastolic dysfunction stage 1, normal RVSF, mild TR  Marked lower extremity edema, no crackles   -Denies recent lasix usage

## 2022-12-30 NOTE — PHYSICAL THERAPY INITIAL EVALUATION ADULT - PLANNED THERAPY INTERVENTIONS, PT EVAL
bed mobility training/gait training/strengthening/transfer training GOAL: WC MANAGEMENT/bed mobility training/gait training/strengthening/transfer training

## 2022-12-30 NOTE — PROGRESS NOTE ADULT - PROBLEM SELECTOR PLAN 2
Hx CNI tox, tolerating evolimus  Per EMR on Mycophenolate mofetil, prednisone  -C/W Prednisone 20mg  -C/W folic acid 1g/day    Appreciate Transplant Hepatology Recs  -Holding Everolimus  -Hold Home Mycophenolate    Appreciate Transplant ID Recs  -F/U CMV PCR Hx CNI tox, tolerating evolimus  Per EMR on Mycophenolate mofetil, prednisone  -C/W Prednisone 20mg  -C/W folic acid 1g/day    Appreciate Transplant Hepatology Recs  -Holding Everolimus  -Continue Mycophenolate    Appreciate Transplant ID Recs  -CMV PCR; Detected 12/28 Hx CNI tox, tolerating evolimus  Per EMR on Mycophenolate mofetil, prednisone  -C/W Prednisone 20mg  -C/W folic acid 1g/day    Appreciate Transplant Hepatology Recs  -Holding Everolimus  -Continue Mycophenolate  Appreciate Transplant ID Recs  -CMV PCR; Detected 12/28

## 2022-12-30 NOTE — PHYSICAL THERAPY INITIAL EVALUATION ADULT - TRANSFER TRAINING, PT EVAL
GOAL: Pt will perform ALL transfers with independence, w/use of appropriate assistive device as needed, in 4 weeks.

## 2022-12-30 NOTE — PROGRESS NOTE ADULT - ASSESSMENT
67 M with s/p right foot 5th ray amputation on 12/28  - Pt seen and evaluated  - Afebrile, no leukocytosis  - s/p right foot partial 5th ray amputation  w kerecis graft application, graft intact, adaptic intact, no drainage, no seroma, no erythema, no clinical signs of infection. Left foot with no acute signs of infection.   - Right foot Xray: 5th met and proximal phalanx OM   - LETICIA/PVR: ABIs noncompressible b/l, waveforms normal - no arterial disease  - Low concern for residual infection, high concern for viability   - Intraop data pending   - Patient is stable for discharge pending clean bone culture no growth x48hrs  - F/u information and instruction in the f/u section of d/c provider note   - Seen with attending

## 2022-12-31 NOTE — DISCHARGE NOTE NURSING/CASE MANAGEMENT/SOCIAL WORK - NSSCTYPOFSERV_GEN_ALL_CORE
[facility]  GROUP DOCUMENTATION INDIVIDUAL                                                                          Group Therapy Note    Date: 8/4/2021    Group Start Time: 2000  Group End Time: 2030  Group Topic: Community Meeting    [id number] [facility] GROUP DOCUMENTATION GROUP    Group Therapy Note    Attendees: 3         Attendance: Attended    Patient's Goal:  To stay focused    Interventions/techniques: Supported    Follows Directions: Followed directions    Interactions: Interacted appropriately    Mental Status: Flat and Preoccupied    Behavior/appearance: Cooperative    Goals Achieved: Able to engage in interactions, Able to listen to others and Able to give feedback to another      Additional Notes: Meredith Cuevas was out for the [employer]. She stated that she is having a great day, ate great, and will sleep okay tonight. She is having no anxiety nor depression. Nothing good nor bad has happened today. She has no thoughts of hurting herself nor anyone else and is having some thumb pain at the level of a 10. Nurse notified.     Neelima Ruiz CNA home care

## 2022-12-31 NOTE — PROGRESS NOTE ADULT - PROBLEM SELECTOR PLAN 1
S/P Right 5th ray amputation; no signficant pain or continued bleeding  F/U bone culture 12/29    Appreciate ID Recs  -Zosyn 3.375 q8h  -Valgancyclovir 450mg q12h iso elevated CMV levels    Appreciate Transplant Hepatology Recs:  -Hold Everolimus  -Continue Mycophenolate 250mg BID S/P Right 5th ray amputation; no signficant pain or continued bleeding  F/U bone culture 12/29 NGTD    Appreciate ID Recs  -Zosyn 3.375 q8h: DC  -Valgancyclovir 450mg q12h iso elevated CMV levels    Appreciate Transplant Hepatology Recs:  -Hold Everolimus  -Continue Mycophenolate 250mg BID

## 2022-12-31 NOTE — PROGRESS NOTE ADULT - SUBJECTIVE AND OBJECTIVE BOX
Feels good  No new GI or liver related symptom  No NVD  No diarrhea   No chest pain or SOB   No cough     Stable vital signs   Clear lungs   No LN  Soft abdomen   Alert and awake

## 2022-12-31 NOTE — PROGRESS NOTE ADULT - ATTENDING COMMENTS
This is a 67 years old male with HTN, IDDM (A1C 6.7% in 11/2022), HF, DM neuropathy, PUD, DVT, JEAN cirrhosis s/p OLT 7/2/2020, hx CMV viremia, hx VRE bacteremia, multiple R foot wounds/resections, sent by his podiatry for worsening wound on the lateral aspect of his right foot 5th metatarsal.  Patient completed Augmentin for 10 days, has been afebrile.    1. Wound infection with OM of right 5th toe.   2. Chronic OM foot  3. IDDM  4. JEAN Cirrhosis with h/o UGIB  5. H/o CMV viremia, VRE bacteremia    - Afebrile, VSS, no pain in R foot, WBC wnl, Sed rate 99  - NPO p MN for scheduled for R foot fifth ray resection with PT tenotomy with Dr. Wang this am. MRI shows concerning for OM and some collection around 5th toe.  - Appreciated Transplant ID and Hepatology plans- c/w IV Zosyn, held Everolimus but c/w Cellcept  will f/u blood c/, wound c/s  - No c/o chest pain, SOB. In Nov 2022 Echo shows EF 61%, no aortic valve regurgitation seen. Normal left ventricular systolic function. No segmental wall motion abnormalities. No left ventricular thrombus. Mild diastolic dysfunction (Stage I). Normal right ventricular size and function.  - The patient is low to intermediate risk for proposed procedure. No contraindication given his status,. lab values, Vitals  - Held DVT ppx
This is a 67 years old male with HTN, IDDM (A1C 6.7% in 11/2022), HF, DM neuropathy, PUD, DVT, JEAN cirrhosis s/p OLT 7/2/2020, hx CMV viremia, hx VRE bacteremia, multiple R foot wounds/resections, sent by his podiatry for worsening wound on the lateral aspect of his right foot 5th metatarsal.  Patient completed Augmentin for 10 days, has been afebrile.    1. Worsening wound infection with OM of right foot 5th metatarsal.   2. Chronic OM foot  3. IDDM  4. JEAN Cirrhosis with h/o UGIB  5. H/o CMV viremia, VRE bacteremia    - No pain in R foot, afebrile, WBC wnl, Sed rate 99  - Appreciated Transplant ID and Hepatology plans- c/w IV Zosyn, held Everolimus but c/w Cellcept  will f/u blood c/, wound c/s  - Podiatry did the foot dressing and plans for OR tomorrow- ray amputation of R 5th toe. MRI shows concerning for OM and some collection around 5th toe.  - No c/o chest pain, SOB. In Nov 2022 Echo shows EF 61%, no aortic valve regurgitation seen. Normal left ventricular systolic function. No segmental wall motion abnormalities. No left ventricular thrombus. Mild diastolic dysfunction (Stage I). Normal right ventricular size and function.  - The patient is low to intermediate risk for proposed procedure. No contraindication given his status,. lab values  - NPO p MN, will give 1/2 of current basal insulin tonight  - Hold DVT ppx after tonight's dose
This is a 67 years old male with HTN, IDDM (A1C 6.7% in 11/2022), HF, DM neuropathy, PUD, DVT, JEAN cirrhosis s/p OLT 7/2/2020, hx CMV viremia, hx VRE bacteremia, multiple R foot wounds/resections, sent by his podiatry for worsening wound on the lateral aspect of his right foot 5th metatarsal.  Patient completed Augmentin for 10 days, has been afebrile.    1. Wound infection with OM of right 5th toe.   2. Chronic OM foot  3. IDDM  4. JEAN Cirrhosis with h/o UGIB  5. H/o CMV viremia, VRE bacteremia    - Feels ok, afebrile, VSS, no pain in R foot, WBC wnl, s/p R foot fifth ray resection w/ PT tenotomy. bone cx NGTD, per ID can switch to augmentin for discharge. Wound vac to be arranged today, once delivered pt can be discharged home.   - elevated CMV titers, on valcyte, can follow up with ID outpt in 2 weeks   - Transplant Hepatology plans- held Everolimus but c/w Cellcept and pred daily, outpt follow up with Hep clininc in 1 week  - DVT ppx.  - PT eval noted- patient wants to go to Home with home care.  is aware  - d/c today upon delivery of wound vac
This is a 67 years old male with HTN, IDDM (A1C 6.7% in 11/2022), HF, DM neuropathy, PUD, DVT, JEAN cirrhosis s/p OLT 7/2/2020, hx CMV viremia, hx VRE bacteremia, multiple R foot wounds/resections, sent by his podiatry for worsening wound on the lateral aspect of his right foot 5th metatarsal.  Patient completed Augmentin for 10 days, has been afebrile.    1. Worsening wound infection with OM of right foot 5th metatarsal.   2. Chronic OM foot  3. IDDM  4. JEAN Cirrhosis with h/o UGIB  5. H/o CMV viremia, VRE bacteremia    - Afebrile, WBC wnl, Sed rate 99  - On IV Cefepime/Vanco, blood c/s done in ED. ID consult called  - Appreciated Podiatry consult and plan- possible Ray amputation of R 5th toe  - H/H stable, VSS  - c/w other home meds
This is a 67 years old male with HTN, IDDM (A1C 6.7% in 11/2022), HF, DM neuropathy, PUD, DVT, JEAN cirrhosis s/p OLT 7/2/2020, hx CMV viremia, hx VRE bacteremia, multiple R foot wounds/resections, sent by his podiatry for worsening wound on the lateral aspect of his right foot 5th metatarsal.  Patient completed Augmentin for 10 days, has been afebrile.    1. Wound infection with OM of right 5th toe.   2. Chronic OM foot  3. IDDM  4. JEAN Cirrhosis with h/o UGIB  5. H/o CMV viremia, VRE bacteremia    - Feels ok, afebrile, VSS, no pain in R foot, WBC wnl  - s/p R foot fifth ray resection with PT tenotomy with Dr. Wang yesterday. MRI shows concerning for OM and some collection around 5th toe.  will f/u podiatry plans on weight bearing status, dressing/VAC and f/u plans  - spoke to Transplant ID- given elevated CMV titers may need to add Valcyte, c/w IV zosyn, awaiting on Bone c/s  - Transplant Hepatology plans- held Everolimus but c/w Cellcept  - No c/o chest pain, SOB. In Nov 2022 Echo shows EF 61%, no aortic valve regurgitation seen. Normal left ventricular systolic function. No segmental wall motion abnormalities. No left ventricular thrombus. Mild diastolic dysfunction (Stage I). Normal right ventricular size and function.  - DVT ppx.  - PT eval noted- patient wants to go to Home with home care.  is aware  - d/c planning upon bone c/s report, ID clearance

## 2022-12-31 NOTE — DISCHARGE NOTE NURSING/CASE MANAGEMENT/SOCIAL WORK - NSDCPEFALRISK_GEN_ALL_CORE
For information on Fall & Injury Prevention, visit: [email or web address] OR  [email or web address] OR  https://www.cdc.gov/steadi/patient.html

## 2022-12-31 NOTE — PROGRESS NOTE ADULT - REASON FOR ADMISSION
C/F Foot osteomyelitis

## 2022-12-31 NOTE — PROGRESS NOTE ADULT - ASSESSMENT
67M PMHx HTN, IDDM, diabetic foot ulcers, CKD, HFpEF, neuropathy, PUD, nephrolithiasis, JEAN cirrhosis s/p OLT 7/2/2020 VRE bacteremia, CMV viremia, lower extremity edema, and hx multiple toe amputations status post right 5th ray amputation; POD1; with well-controlled pain and no signs of bleeding or infection.   67M PMHx HTN, IDDM, diabetic foot ulcers, CKD, HFpEF, neuropathy, PUD, nephrolithiasis, JEAN cirrhosis s/p OLT 7/2/2020 VRE bacteremia, CMV viremia, lower extremity edema, and hx multiple toe amputations status post right 5th ray amputation; POD1; with well-controlled pain and no signs of bleeding or infection; negative cultures pending DC

## 2022-12-31 NOTE — DISCHARGE NOTE NURSING/CASE MANAGEMENT/SOCIAL WORK - PATIENT PORTAL LINK FT
You can access the FollowMyHealth Patient Portal offered by [facility] by registering at the following website: [email or web address]. By joining [facility]’s FollowMyHealth portal, you will also be able to view your health information using other applications (apps) compatible with our system.

## 2022-12-31 NOTE — DISCHARGE NOTE NURSING/CASE MANAGEMENT/SOCIAL WORK - NSDCFUADDAPPT_GEN_ALL_CORE_FT
Podiatry Discharge Instructions:  Follow up: Please follow up with Dr. Carroll Wang within 1 week of discharge from the hospital, please call [phone] for appointment and discuss that you recently were seen in the hospital.  Wound Care: Please apply wound vac to R foot @75mmHg three times a week over the graft.   Weight bearing: Please remain non weight bearing to the R foot.  Antibiotics: Please continue as instructed.

## 2022-12-31 NOTE — PROGRESS NOTE ADULT - SUBJECTIVE AND OBJECTIVE BOX
INCOMPLETE NOTE    Luis Manuel Govea | PGY1| Pager: [phone]  Interval Events:    REVIEW OF SYSTEMS:  CONSTITUTIONAL: No weakness, fevers or chills  EYES/ENT: No visual changes;  No vertigo or throat pain   NECK: No pain or stiffness  RESPIRATORY: No cough, wheezing, hemoptysis; No shortness of breath  CARDIOVASCULAR: No chest pain or palpitations  GASTROINTESTINAL: No abdominal or epigastric pain. No nausea, vomiting, or hematemesis; No diarrhea or constipation. No melena or hematochezia.  GENITOURINARY: No dysuria, frequency or hematuria  NEUROLOGICAL: No numbness or weakness  SKIN: No itching, burning, rashes, or lesions   All other review of systems is negative unless indicated above.    OBJECTIVE:  ICU Vital Signs Last 24 Hrs  T(C): 37.2 (31 Dec 2022 00:25), Max: 37.2 (30 Dec 2022 15:26)  T(F): 98.9 (31 Dec 2022 00:25), Max: 98.9 (30 Dec 2022 15:26)  HR: 64 (31 Dec 2022 00:25) (64 - 78)  BP: 131/66 (31 Dec 2022 00:25) (117/58 - 131/66)  BP(mean): --  ABP: --  ABP(mean): --  RR: 18 (31 Dec 2022 00:25) (18 - 18)  SpO2: 99% (31 Dec 2022 00:25) (94% - 99%)    O2 Parameters below as of 31 Dec 2022 00:25  Patient On (Oxygen Delivery Method): room air              12-29 @ 07:01 - 12-30 @ 07:00  --------------------------------------------------------  IN: 940 mL / OUT: 900 mL / NET: 40 mL    12-30 @ 07:01  -  12-31 @ 03:30  --------------------------------------------------------  IN: 340 mL / OUT: 1300 mL / NET: -960 mL      CAPILLARY BLOOD GLUCOSE      POCT Blood Glucose.: 312 mg/dL (30 Dec 2022 21:45)      PHYSICAL EXAM:  General: WN/WD NAD  Neurology: A&Ox3, nonfocal, TORREZ x 4  Eyes: PERRLA/ EOMI, Gross vision intact  ENT/Neck: Neck supple, trachea midline, No JVD, Gross hearing intact  Respiratory: CTA B/L, No wheezing, rales, rhonchi  CV: RRR, +S1/S2, -S3/S4, no murmurs, rubs or gallops  Abdominal: Soft, NT, ND +BS, No HSM  MSK: 5/5 strength UE/LE bilaterally  Extremities: No edema, 2+ peripheral pulses  Skin: No Rashes, Hematoma, Ecchymosis  Incisions:   Tubes:    HOSPITAL MEDICATIONS:  MEDICATIONS  (STANDING):  dextrose 5%. 1000 milliLiter(s) (50 mL/Hr) IV Continuous <Continuous>  dextrose 5%. 1000 milliLiter(s) (100 mL/Hr) IV Continuous <Continuous>  folic acid 1 milliGRAM(s) Oral daily  glucagon  Injectable 1 milliGRAM(s) IntraMuscular once  heparin   Injectable 5000 Unit(s) SubCutaneous every 8 hours  insulin glargine Injectable (LANTUS) 10 Unit(s) SubCutaneous at bedtime  insulin lispro (ADMELOG) corrective regimen sliding scale   SubCutaneous three times a day before meals  mycophenolate mofetil 250 milliGRAM(s) Oral two times a day  pantoprazole    Tablet 40 milliGRAM(s) Oral before breakfast  piperacillin/tazobactam IVPB.. 3.375 Gram(s) IV Intermittent every 8 hours  predniSONE   Tablet 20 milliGRAM(s) Oral daily  senna 2 Tablet(s) Oral at bedtime  sertraline 100 milliGRAM(s) Oral daily  tamsulosin 0.4 milliGRAM(s) Oral at bedtime  valGANciclovir 450 milliGRAM(s) Oral every 12 hours    MEDICATIONS  (PRN):  acetaminophen     Tablet .. 650 milliGRAM(s) Oral every 6 hours PRN Mild Pain (1 - 3)  morphine  - Injectable 2 milliGRAM(s) IV Push every 4 hours PRN Severe Pain (7 - 10)  polyethylene glycol 3350 17 Gram(s) Oral daily PRN Constipation      LABS:                        10.2   5.87  )-----------( 188      ( 30 Dec 2022 06:57 )             31.8     Hgb Trend: 10.2<--, 10.6<--, 10.3<--, 11.0<--, 11.2<--  12-30    137  |  107  |  32<H>  ----------------------------<  199<H>  4.3   |  22  |  1.89<H>    Ca    8.6      30 Dec 2022 06:55      Creatinine Trend: 1.89<--, 1.86<--, 1.86<--, 1.56<--, 1.60<--  PT/INR - ( 29 Dec 2022 06:21 )   PT: 11.9 sec;   INR: 1.03 ratio                   MICROBIOLOGY:     Culture - Tissue with Gram Stain (collected 29 Dec 2022 13:56)  Source: .Tissue right foot 5th metatarsal  Gram Stain (29 Dec 2022 23:00):    No polymorphonuclear cells seen per low power field    No organisms seen per oil power field  Preliminary Report (30 Dec 2022 12:27):    No growth       Luis Manuel Govea | PGY1| Pager: [phone]  Interval Events: NAEON; No growth in bone cultures thus far; likely pending DC    REVIEW OF SYSTEMS:  CONSTITUTIONAL: No weakness, fevers or chills  RESPIRATORY: No cough, wheezing, hemoptysis; No shortness of breath  CARDIOVASCULAR: No chest pain or palpitations  GASTROINTESTINAL: No abdominal or epigastric pain. No diarrhea or constipation.  SKIN: No itching, burning, rashes, or lesions   All other review of systems is negative unless indicated above.    OBJECTIVE:  ICU Vital Signs Last 24 Hrs  T(C): 37.2 (31 Dec 2022 00:25), Max: 37.2 (30 Dec 2022 15:26)  T(F): 98.9 (31 Dec 2022 00:25), Max: 98.9 (30 Dec 2022 15:26)  HR: 64 (31 Dec 2022 00:25) (64 - 78)  BP: 131/66 (31 Dec 2022 00:25) (117/58 - 131/66)  BP(mean): --  ABP: --  ABP(mean): --  RR: 18 (31 Dec 2022 00:25) (18 - 18)  SpO2: 99% (31 Dec 2022 00:25) (94% - 99%)    O2 Parameters below as of 31 Dec 2022 00:25  Patient On (Oxygen Delivery Method): room air              12-29 @ 07:01  -  12-30 @ 07:00  --------------------------------------------------------  IN: 940 mL / OUT: 900 mL / NET: 40 mL    12-30 @ 07:01  -  12-31 @ 03:30  --------------------------------------------------------  IN: 340 mL / OUT: 1300 mL / NET: -960 mL      CAPILLARY BLOOD GLUCOSE      POCT Blood Glucose.: 312 mg/dL (30 Dec 2022 21:45)      PHYSICAL EXAM:  General: NAD  Neurology: A&Ox3, nonfocal, TORREZ x 4  Respiratory: CTA B/L, No wheezing, rales, rhonchi  CV: RRR, +S1/S2, -S3/S4, no murmurs, rubs or gallops  Abdominal: Soft, NT, ND +BS, No HSM  Extremities: No edema, 2+ peripheral pulses  Skin: RLE skin appears clean intact, dressing is clean, woundvac working  Incisions: RLE dressing is clean dry and intact      HOSPITAL MEDICATIONS:  MEDICATIONS  (STANDING):  dextrose 5%. 1000 milliLiter(s) (50 mL/Hr) IV Continuous <Continuous>  dextrose 5%. 1000 milliLiter(s) (100 mL/Hr) IV Continuous <Continuous>  folic acid 1 milliGRAM(s) Oral daily  glucagon  Injectable 1 milliGRAM(s) IntraMuscular once  heparin   Injectable 5000 Unit(s) SubCutaneous every 8 hours  insulin glargine Injectable (LANTUS) 10 Unit(s) SubCutaneous at bedtime  insulin lispro (ADMELOG) corrective regimen sliding scale   SubCutaneous three times a day before meals  mycophenolate mofetil 250 milliGRAM(s) Oral two times a day  pantoprazole    Tablet 40 milliGRAM(s) Oral before breakfast  piperacillin/tazobactam IVPB.. 3.375 Gram(s) IV Intermittent every 8 hours  predniSONE   Tablet 20 milliGRAM(s) Oral daily  senna 2 Tablet(s) Oral at bedtime  sertraline 100 milliGRAM(s) Oral daily  tamsulosin 0.4 milliGRAM(s) Oral at bedtime  valGANciclovir 450 milliGRAM(s) Oral every 12 hours    MEDICATIONS  (PRN):  acetaminophen     Tablet .. 650 milliGRAM(s) Oral every 6 hours PRN Mild Pain (1 - 3)  morphine  - Injectable 2 milliGRAM(s) IV Push every 4 hours PRN Severe Pain (7 - 10)  polyethylene glycol 3350 17 Gram(s) Oral daily PRN Constipation      LABS:                        10.2   5.87  )-----------( 188      ( 30 Dec 2022 06:57 )             31.8     Hgb Trend: 10.2<--, 10.6<--, 10.3<--, 11.0<--, 11.2<--  12-30    137  |  107  |  32<H>  ----------------------------<  199<H>  4.3   |  22  |  1.89<H>    Ca    8.6      30 Dec 2022 06:55      Creatinine Trend: 1.89<--, 1.86<--, 1.86<--, 1.56<--, 1.60<--  PT/INR - ( 29 Dec 2022 06:21 )   PT: 11.9 sec;   INR: 1.03 ratio                   MICROBIOLOGY:     Culture - Tissue with Gram Stain (collected 29 Dec 2022 13:56)  Source: .Tissue right foot 5th metatarsal  Gram Stain (29 Dec 2022 23:00):    No polymorphonuclear cells seen per low power field    No organisms seen per oil power field  Preliminary Report (30 Dec 2022 12:27):    No growth

## 2022-12-31 NOTE — CHART NOTE - NSCHARTNOTEFT_GEN_A_CORE
OK to dc from Hepatology standpoint.    C/w Cellcept 500 mg BID, pred 20 daily. Continue holding everolimus.     Follow up in Hepatology Clinic in 1 week: [phone] ([facility]ulty [facility] at [facility] at [address] [place] [address])

## 2022-12-31 NOTE — PROGRESS NOTE ADULT - ASSESSMENT
Post OLT in 2020  Stable from hepatology point of view for DC  Hold everolimus   Continue prednisone and cellcept (500 BID)  Follow up with Dr. Medina in 1-2 weeks

## 2022-12-31 NOTE — PROGRESS NOTE ADULT - PROBLEM SELECTOR PLAN 8
Diet: Consistent carb  DVT: Heparin SQH; no Eliquis at home  GI: c/w home protonix, c/w 2 senna  Dispo: Home with Home PT

## 2022-12-31 NOTE — PROGRESS NOTE ADULT - PROBLEM SELECTOR PLAN 2
Hx CNI tox, tolerating evolimus  Per EMR on Mycophenolate mofetil, prednisone  -C/W Prednisone 20mg  -C/W folic acid 1g/day    Appreciate Transplant Hepatology Recs  -Holding Everolimus  -Continue Mycophenolate  Appreciate Transplant ID Recs  -CMV PCR; Detected 12/28 Hx CNI tox, tolerating evolimus  Per EMR on Mycophenolate mofetil, prednisone  -C/W Prednisone 20mg  -C/W folic acid 1g/day    Appreciate Transplant Hepatology Recs  -Holding Everolimus  -Continue Mycophenolate  Appreciate Transplant ID Recs  -CMV PCR; Detected 12/28  -Valgancyclovir until o/p appt.

## 2022-12-31 NOTE — PROGRESS NOTE ADULT - PROVIDER SPECIALTY LIST ADULT
Transplant ID
Podiatry
Transplant ID
Podiatry
Podiatry
Hepatology
Podiatry
Internal Medicine

## 2022-12-31 NOTE — DISCHARGE NOTE NURSING/CASE MANAGEMENT/SOCIAL WORK - NSDCVIVACCINE_GEN_ALL_CORE_FT
influenza, injectable, quadrivalent, preservative free; 17-Dec-2019 10:15; Rajinder Rivera (RN); [employer]; [id number] (Exp. Date: 30-Jun-2020); IntraMuscular; Deltoid Right.; 0.5 milliLiter(s); VIS (VIS Published: 15-Aug-2019, VIS Presented: 17-Dec-2019);   Pneumococcal conjugate PCV 13; 16-Dec-2019 18:28; Jodie Serrano (RN); [facility]; [id number] (Exp. Date: 01-Jun-2021); IntraMuscular; Deltoid Right.; 0.5 milliLiter(s); VIS (VIS Published: 05-Nov-2015, VIS Presented: 16-Dec-2019);   pneumococcal polysaccharide PPV23; 18-Feb-2020 13:31; Carroll Guardado (RN); [employer] [id number] (Exp. Date: 18-May-2021); IntraMuscular; Deltoid Right.; 0.5 milliLiter(s); VIS (VIS Published: 06-Oct-2009, VIS Presented: 18-Feb-2020);

## 2023-01-01 ENCOUNTER — APPOINTMENT (OUTPATIENT)
Dept: WOUND CARE | Facility: CLINIC | Age: 68
End: 2023-01-01
Payer: MEDICARE

## 2023-01-01 ENCOUNTER — INPATIENT (INPATIENT)
Facility: HOSPITAL | Age: 68
LOS: 1 days | DRG: 683 | End: 2023-07-12
Attending: INTERNAL MEDICINE | Admitting: STUDENT IN AN ORGANIZED HEALTH CARE EDUCATION/TRAINING PROGRAM
Payer: MEDICARE

## 2023-01-01 ENCOUNTER — APPOINTMENT (OUTPATIENT)
Dept: HEMATOLOGY ONCOLOGY | Facility: CLINIC | Age: 68
End: 2023-01-01

## 2023-01-01 ENCOUNTER — APPOINTMENT (OUTPATIENT)
Dept: ORTHOPEDIC SURGERY | Facility: CLINIC | Age: 68
End: 2023-01-01

## 2023-01-01 ENCOUNTER — NON-APPOINTMENT (OUTPATIENT)
Age: 68
End: 2023-01-01

## 2023-01-01 ENCOUNTER — TRANSCRIPTION ENCOUNTER (OUTPATIENT)
Age: 68
End: 2023-01-01

## 2023-01-01 ENCOUNTER — APPOINTMENT (OUTPATIENT)
Dept: INFECTIOUS DISEASE | Facility: CLINIC | Age: 68
End: 2023-01-01
Payer: MEDICARE

## 2023-01-01 ENCOUNTER — APPOINTMENT (OUTPATIENT)
Dept: HEPATOLOGY | Facility: CLINIC | Age: 68
End: 2023-01-01

## 2023-01-01 ENCOUNTER — OUTPATIENT (OUTPATIENT)
Dept: OUTPATIENT SERVICES | Facility: HOSPITAL | Age: 68
LOS: 1 days | End: 2023-01-01
Payer: MEDICARE

## 2023-01-01 ENCOUNTER — OUTPATIENT (OUTPATIENT)
Dept: OUTPATIENT SERVICES | Facility: HOSPITAL | Age: 68
LOS: 1 days | Discharge: HOME | End: 2023-01-01

## 2023-01-01 ENCOUNTER — APPOINTMENT (OUTPATIENT)
Dept: WOUND CARE | Facility: CLINIC | Age: 68
End: 2023-01-01

## 2023-01-01 ENCOUNTER — APPOINTMENT (OUTPATIENT)
Dept: HEPATOLOGY | Facility: CLINIC | Age: 68
End: 2023-01-01
Payer: MEDICARE

## 2023-01-01 ENCOUNTER — LABORATORY RESULT (OUTPATIENT)
Age: 68
End: 2023-01-01

## 2023-01-01 ENCOUNTER — INPATIENT (INPATIENT)
Facility: HOSPITAL | Age: 68
LOS: 7 days | Discharge: NURSING, REHAB OR LONG-TERM CARE | DRG: 853 | End: 2023-01-30
Attending: INTERNAL MEDICINE | Admitting: HOSPITALIST
Payer: MEDICARE

## 2023-01-01 ENCOUNTER — FORM ENCOUNTER (OUTPATIENT)
Age: 68
End: 2023-01-01

## 2023-01-01 ENCOUNTER — INPATIENT (INPATIENT)
Facility: HOSPITAL | Age: 68
LOS: 18 days | Discharge: HOME HEALTH | DRG: 564 | End: 2023-06-30
Attending: STUDENT IN AN ORGANIZED HEALTH CARE EDUCATION/TRAINING PROGRAM | Admitting: STUDENT IN AN ORGANIZED HEALTH CARE EDUCATION/TRAINING PROGRAM
Payer: MEDICARE

## 2023-01-01 ENCOUNTER — RESULT REVIEW (OUTPATIENT)
Age: 68
End: 2023-01-01

## 2023-01-01 VITALS
DIASTOLIC BLOOD PRESSURE: 62 MMHG | SYSTOLIC BLOOD PRESSURE: 134 MMHG | WEIGHT: 250 LBS | TEMPERATURE: 99 F | HEART RATE: 86 BPM | RESPIRATION RATE: 20 BRPM | OXYGEN SATURATION: 98 % | HEIGHT: 73 IN

## 2023-01-01 VITALS
HEIGHT: 72 IN | TEMPERATURE: 97.6 F | OXYGEN SATURATION: 98 % | SYSTOLIC BLOOD PRESSURE: 130 MMHG | BODY MASS INDEX: 35.89 KG/M2 | WEIGHT: 265 LBS | DIASTOLIC BLOOD PRESSURE: 74 MMHG | HEART RATE: 67 BPM

## 2023-01-01 VITALS
HEART RATE: 82 BPM | TEMPERATURE: 98.5 F | WEIGHT: 258 LBS | OXYGEN SATURATION: 98 % | SYSTOLIC BLOOD PRESSURE: 99 MMHG | RESPIRATION RATE: 14 BRPM | HEIGHT: 72 IN | DIASTOLIC BLOOD PRESSURE: 61 MMHG | BODY MASS INDEX: 34.95 KG/M2

## 2023-01-01 VITALS
DIASTOLIC BLOOD PRESSURE: 50 MMHG | TEMPERATURE: 98 F | OXYGEN SATURATION: 95 % | RESPIRATION RATE: 16 BRPM | SYSTOLIC BLOOD PRESSURE: 98 MMHG | HEART RATE: 80 BPM

## 2023-01-01 VITALS
TEMPERATURE: 98 F | HEART RATE: 76 BPM | WEIGHT: 258 LBS | DIASTOLIC BLOOD PRESSURE: 70 MMHG | OXYGEN SATURATION: 99 % | SYSTOLIC BLOOD PRESSURE: 107 MMHG | HEIGHT: 72.5 IN | RESPIRATION RATE: 14 BRPM | BODY MASS INDEX: 34.57 KG/M2

## 2023-01-01 VITALS
DIASTOLIC BLOOD PRESSURE: 63 MMHG | SYSTOLIC BLOOD PRESSURE: 110 MMHG | HEIGHT: 73 IN | TEMPERATURE: 98 F | WEIGHT: 265 LBS | HEART RATE: 68 BPM | RESPIRATION RATE: 16 BRPM | OXYGEN SATURATION: 99 %

## 2023-01-01 VITALS — TEMPERATURE: 97.5 F

## 2023-01-01 VITALS
OXYGEN SATURATION: 98 % | DIASTOLIC BLOOD PRESSURE: 62 MMHG | RESPIRATION RATE: 18 BRPM | TEMPERATURE: 98 F | HEART RATE: 69 BPM | SYSTOLIC BLOOD PRESSURE: 117 MMHG

## 2023-01-01 VITALS
HEART RATE: 85 BPM | DIASTOLIC BLOOD PRESSURE: 48 MMHG | RESPIRATION RATE: 18 BRPM | OXYGEN SATURATION: 98 % | TEMPERATURE: 97 F | SYSTOLIC BLOOD PRESSURE: 121 MMHG

## 2023-01-01 VITALS
SYSTOLIC BLOOD PRESSURE: 102 MMHG | TEMPERATURE: 98 F | HEART RATE: 76 BPM | RESPIRATION RATE: 18 BRPM | WEIGHT: 240.08 LBS | OXYGEN SATURATION: 95 % | DIASTOLIC BLOOD PRESSURE: 53 MMHG | HEIGHT: 73 IN

## 2023-01-01 VITALS — TEMPERATURE: 97.6 F

## 2023-01-01 VITALS
HEART RATE: 69 BPM | RESPIRATION RATE: 16 BRPM | OXYGEN SATURATION: 98 % | SYSTOLIC BLOOD PRESSURE: 117 MMHG | HEIGHT: 73 IN | DIASTOLIC BLOOD PRESSURE: 71 MMHG | BODY MASS INDEX: 33.8 KG/M2 | WEIGHT: 255 LBS

## 2023-01-01 VITALS
RESPIRATION RATE: 14 BRPM | HEART RATE: 72 BPM | DIASTOLIC BLOOD PRESSURE: 73 MMHG | SYSTOLIC BLOOD PRESSURE: 114 MMHG | OXYGEN SATURATION: 100 %

## 2023-01-01 VITALS — TEMPERATURE: 97.2 F

## 2023-01-01 VITALS — TEMPERATURE: 97.3 F

## 2023-01-01 DIAGNOSIS — Z29.9 ENCOUNTER FOR PROPHYLACTIC MEASURES, UNSPECIFIED: ICD-10-CM

## 2023-01-01 DIAGNOSIS — L97.519 NON-PRESSURE CHRONIC ULCER OF OTHER PART OF RIGHT FOOT WITH UNSPECIFIED SEVERITY: ICD-10-CM

## 2023-01-01 DIAGNOSIS — L03.90 CELLULITIS, UNSPECIFIED: ICD-10-CM

## 2023-01-01 DIAGNOSIS — Z79.4 LONG TERM (CURRENT) USE OF INSULIN: ICD-10-CM

## 2023-01-01 DIAGNOSIS — L89.612 PRESSURE ULCER OF RIGHT HEEL, STAGE 2: ICD-10-CM

## 2023-01-01 DIAGNOSIS — Z01.818 ENCOUNTER FOR OTHER PREPROCEDURAL EXAMINATION: ICD-10-CM

## 2023-01-01 DIAGNOSIS — U07.1 COVID-19: ICD-10-CM

## 2023-01-01 DIAGNOSIS — E87.20 ACIDOSIS, UNSPECIFIED: ICD-10-CM

## 2023-01-01 DIAGNOSIS — I95.1 ORTHOSTATIC HYPOTENSION: ICD-10-CM

## 2023-01-01 DIAGNOSIS — L03.115 CELLULITIS OF RIGHT LOWER LIMB: ICD-10-CM

## 2023-01-01 DIAGNOSIS — M25.551 PAIN IN RIGHT HIP: ICD-10-CM

## 2023-01-01 DIAGNOSIS — Z76.82 AWAITING ORGAN TRANSPLANT STATUS: ICD-10-CM

## 2023-01-01 DIAGNOSIS — Z90.49 ACQUIRED ABSENCE OF OTHER SPECIFIED PARTS OF DIGESTIVE TRACT: Chronic | ICD-10-CM

## 2023-01-01 DIAGNOSIS — B25.9 CYTOMEGALOVIRAL DISEASE, UNSPECIFIED: ICD-10-CM

## 2023-01-01 DIAGNOSIS — E87.1 HYPO-OSMOLALITY AND HYPONATREMIA: ICD-10-CM

## 2023-01-01 DIAGNOSIS — R19.7 DIARRHEA, UNSPECIFIED: ICD-10-CM

## 2023-01-01 DIAGNOSIS — N18.9 CHRONIC KIDNEY DISEASE, UNSPECIFIED: ICD-10-CM

## 2023-01-01 DIAGNOSIS — I35.0 NONRHEUMATIC AORTIC (VALVE) STENOSIS: ICD-10-CM

## 2023-01-01 DIAGNOSIS — L97.413 NON-PRESSURE CHRONIC ULCER OF RIGHT HEEL AND MIDFOOT WITH NECROSIS OF MUSCLE: ICD-10-CM

## 2023-01-01 DIAGNOSIS — Z79.899 OTHER LONG TERM (CURRENT) DRUG THERAPY: ICD-10-CM

## 2023-01-01 DIAGNOSIS — E11.9 TYPE 2 DIABETES MELLITUS WITHOUT COMPLICATIONS: ICD-10-CM

## 2023-01-01 DIAGNOSIS — L97.519 TYPE 2 DIABETES MELLITUS WITH FOOT ULCER: ICD-10-CM

## 2023-01-01 DIAGNOSIS — A41.9 SEPSIS, UNSPECIFIED ORGANISM: ICD-10-CM

## 2023-01-01 DIAGNOSIS — E11.621 TYPE 2 DIABETES MELLITUS WITH FOOT ULCER: ICD-10-CM

## 2023-01-01 DIAGNOSIS — N17.9 ACUTE KIDNEY FAILURE, UNSPECIFIED: ICD-10-CM

## 2023-01-01 DIAGNOSIS — N39.0 URINARY TRACT INFECTION, SITE NOT SPECIFIED: ICD-10-CM

## 2023-01-01 DIAGNOSIS — Z87.19 PERSONAL HISTORY OF OTHER DISEASES OF THE DIGESTIVE SYSTEM: ICD-10-CM

## 2023-01-01 DIAGNOSIS — Z94.4 LIVER TRANSPLANT STATUS: ICD-10-CM

## 2023-01-01 DIAGNOSIS — E11.9 TYPE 2 DIABETES MELLITUS W/OUT COMPLICATIONS: ICD-10-CM

## 2023-01-01 DIAGNOSIS — M79.89 OTHER SPECIFIED SOFT TISSUE DISORDERS: ICD-10-CM

## 2023-01-01 DIAGNOSIS — L97.512 NON-PRESSURE CHRONIC ULCER OF OTHER PART OF RIGHT FOOT WITH FAT LAYER EXPOSED: ICD-10-CM

## 2023-01-01 DIAGNOSIS — D64.9 ANEMIA, UNSPECIFIED: ICD-10-CM

## 2023-01-01 DIAGNOSIS — R78.81 BACTEREMIA: ICD-10-CM

## 2023-01-01 DIAGNOSIS — G92.8 OTHER TOXIC ENCEPHALOPATHY: ICD-10-CM

## 2023-01-01 DIAGNOSIS — R10.9 UNSPECIFIED ABDOMINAL PAIN: ICD-10-CM

## 2023-01-01 DIAGNOSIS — K74.60 UNSPECIFIED CIRRHOSIS OF LIVER: ICD-10-CM

## 2023-01-01 DIAGNOSIS — T14.8XXA OTHER INJURY OF UNSPECIFIED BODY REGION, INITIAL ENCOUNTER: ICD-10-CM

## 2023-01-01 DIAGNOSIS — Z79.4 TYPE 2 DIABETES MELLITUS W/OUT COMPLICATIONS: ICD-10-CM

## 2023-01-01 DIAGNOSIS — K76.82 HEPATIC ENCEPHALOPATHY: ICD-10-CM

## 2023-01-01 DIAGNOSIS — K75.81 NONALCOHOLIC STEATOHEPATITIS (NASH): ICD-10-CM

## 2023-01-01 DIAGNOSIS — M86.179 OTHER ACUTE OSTEOMYELITIS, UNSPECIFIED ANKLE AND FOOT: ICD-10-CM

## 2023-01-01 DIAGNOSIS — Z87.898 PERSONAL HISTORY OF OTHER SPECIFIED CONDITIONS: ICD-10-CM

## 2023-01-01 DIAGNOSIS — I50.32 CHRONIC DIASTOLIC (CONGESTIVE) HEART FAILURE: ICD-10-CM

## 2023-01-01 DIAGNOSIS — K74.60 NONALCOHOLIC STEATOHEPATITIS (NASH): ICD-10-CM

## 2023-01-01 LAB
-  AMIKACIN: SIGNIFICANT CHANGE UP
-  AMOXICILLIN/CLAVULANIC ACID: SIGNIFICANT CHANGE UP
-  AMPICILLIN/SULBACTAM: SIGNIFICANT CHANGE UP
-  AMPICILLIN: SIGNIFICANT CHANGE UP
-  AZTREONAM: SIGNIFICANT CHANGE UP
-  CEFAZOLIN: SIGNIFICANT CHANGE UP
-  CEFEPIME: SIGNIFICANT CHANGE UP
-  CEFOXITIN: SIGNIFICANT CHANGE UP
-  CEFTRIAXONE: SIGNIFICANT CHANGE UP
-  CIPROFLOXACIN: SIGNIFICANT CHANGE UP
-  COAGULASE NEGATIVE STAPHYLOCOCCUS: SIGNIFICANT CHANGE UP
-  CORYNEBACTERIUM SPECIES: SIGNIFICANT CHANGE UP
-  ERTAPENEM: SIGNIFICANT CHANGE UP
-  GENTAMICIN: SIGNIFICANT CHANGE UP
-  IMIPENEM: SIGNIFICANT CHANGE UP
-  LEVOFLOXACIN: SIGNIFICANT CHANGE UP
-  MEROPENEM: SIGNIFICANT CHANGE UP
-  PIPERACILLIN/TAZOBACTAM: SIGNIFICANT CHANGE UP
-  TOBRAMYCIN: SIGNIFICANT CHANGE UP
-  TRIMETHOPRIM/SULFAMETHOXAZOLE: SIGNIFICANT CHANGE UP
A1C WITH ESTIMATED AVERAGE GLUCOSE RESULT: 6.8 % — HIGH (ref 4–5.6)
A1C WITH ESTIMATED AVERAGE GLUCOSE RESULT: 7.4 % — HIGH (ref 4–5.6)
A1C WITH ESTIMATED AVERAGE GLUCOSE RESULT: 8.4 % — HIGH (ref 4–5.6)
ALBUMIN SERPL ELPH-MCNC: 2.4 G/DL — LOW (ref 3.3–5)
ALBUMIN SERPL ELPH-MCNC: 2.6 G/DL — LOW (ref 3.3–5)
ALBUMIN SERPL ELPH-MCNC: 2.7 G/DL — LOW (ref 3.3–5)
ALBUMIN SERPL ELPH-MCNC: 2.7 G/DL — LOW (ref 3.3–5)
ALBUMIN SERPL ELPH-MCNC: 2.8 G/DL — LOW (ref 3.3–5)
ALBUMIN SERPL ELPH-MCNC: 2.8 G/DL — LOW (ref 3.3–5)
ALBUMIN SERPL ELPH-MCNC: 3 G/DL — LOW (ref 3.3–5)
ALBUMIN SERPL ELPH-MCNC: 3.1 G/DL — LOW (ref 3.3–5)
ALBUMIN SERPL ELPH-MCNC: 3.1 G/DL — LOW (ref 3.3–5)
ALBUMIN SERPL ELPH-MCNC: 3.2 G/DL — LOW (ref 3.3–5)
ALBUMIN SERPL ELPH-MCNC: 3.2 G/DL — LOW (ref 3.3–5)
ALBUMIN SERPL ELPH-MCNC: 3.2 G/DL — LOW (ref 3.3–5.2)
ALBUMIN SERPL ELPH-MCNC: 3.2 G/DL — LOW (ref 3.3–5.2)
ALBUMIN SERPL ELPH-MCNC: 3.3 G/DL
ALBUMIN SERPL ELPH-MCNC: 3.3 G/DL — SIGNIFICANT CHANGE UP (ref 3.3–5)
ALBUMIN SERPL ELPH-MCNC: 3.3 G/DL — SIGNIFICANT CHANGE UP (ref 3.3–5.2)
ALBUMIN SERPL ELPH-MCNC: 3.4 G/DL — SIGNIFICANT CHANGE UP (ref 3.3–5)
ALBUMIN SERPL ELPH-MCNC: 3.5 G/DL — SIGNIFICANT CHANGE UP (ref 3.3–5)
ALBUMIN SERPL ELPH-MCNC: 3.5 G/DL — SIGNIFICANT CHANGE UP (ref 3.3–5)
ALBUMIN SERPL ELPH-MCNC: 3.6 G/DL — SIGNIFICANT CHANGE UP (ref 3.3–5)
ALBUMIN SERPL ELPH-MCNC: 3.6 G/DL — SIGNIFICANT CHANGE UP (ref 3.3–5)
ALBUMIN SERPL ELPH-MCNC: 3.6 G/DL — SIGNIFICANT CHANGE UP (ref 3.3–5.2)
ALBUMIN SERPL ELPH-MCNC: 3.7 G/DL — SIGNIFICANT CHANGE UP (ref 3.3–5)
ALBUMIN SERPL ELPH-MCNC: 3.9 G/DL — SIGNIFICANT CHANGE UP (ref 3.3–5)
ALBUMIN SERPL ELPH-MCNC: 4 G/DL — SIGNIFICANT CHANGE UP (ref 3.3–5)
ALP BLD-CCNC: 143 U/L
ALP SERPL-CCNC: 100 U/L — SIGNIFICANT CHANGE UP (ref 40–120)
ALP SERPL-CCNC: 106 U/L — SIGNIFICANT CHANGE UP (ref 40–120)
ALP SERPL-CCNC: 107 U/L — SIGNIFICANT CHANGE UP (ref 40–120)
ALP SERPL-CCNC: 109 U/L — SIGNIFICANT CHANGE UP (ref 40–120)
ALP SERPL-CCNC: 110 U/L — SIGNIFICANT CHANGE UP (ref 40–120)
ALP SERPL-CCNC: 110 U/L — SIGNIFICANT CHANGE UP (ref 40–120)
ALP SERPL-CCNC: 114 U/L — SIGNIFICANT CHANGE UP (ref 40–120)
ALP SERPL-CCNC: 115 U/L — SIGNIFICANT CHANGE UP (ref 40–120)
ALP SERPL-CCNC: 119 U/L — SIGNIFICANT CHANGE UP (ref 40–120)
ALP SERPL-CCNC: 120 U/L — SIGNIFICANT CHANGE UP (ref 40–120)
ALP SERPL-CCNC: 125 U/L — HIGH (ref 40–120)
ALP SERPL-CCNC: 126 U/L — HIGH (ref 40–120)
ALP SERPL-CCNC: 127 U/L — HIGH (ref 40–120)
ALP SERPL-CCNC: 128 U/L — HIGH (ref 40–120)
ALP SERPL-CCNC: 134 U/L — HIGH (ref 40–120)
ALP SERPL-CCNC: 137 U/L — HIGH (ref 40–120)
ALP SERPL-CCNC: 149 U/L — HIGH (ref 40–120)
ALP SERPL-CCNC: 151 U/L — HIGH (ref 40–120)
ALP SERPL-CCNC: 153 U/L — HIGH (ref 40–120)
ALP SERPL-CCNC: 155 U/L — HIGH (ref 40–120)
ALP SERPL-CCNC: 164 U/L — HIGH (ref 40–120)
ALP SERPL-CCNC: 168 U/L — HIGH (ref 40–120)
ALP SERPL-CCNC: 173 U/L — HIGH (ref 40–120)
ALP SERPL-CCNC: 178 U/L — HIGH (ref 40–120)
ALP SERPL-CCNC: 179 U/L — HIGH (ref 40–120)
ALP SERPL-CCNC: 185 U/L — HIGH (ref 40–120)
ALP SERPL-CCNC: 226 U/L — HIGH (ref 40–120)
ALP SERPL-CCNC: 98 U/L — SIGNIFICANT CHANGE UP (ref 40–120)
ALT FLD-CCNC: 10 U/L — SIGNIFICANT CHANGE UP (ref 10–45)
ALT FLD-CCNC: 12 U/L — SIGNIFICANT CHANGE UP (ref 10–45)
ALT FLD-CCNC: 16 U/L — SIGNIFICANT CHANGE UP (ref 10–45)
ALT FLD-CCNC: 16 U/L — SIGNIFICANT CHANGE UP (ref 10–45)
ALT FLD-CCNC: 165 U/L — HIGH
ALT FLD-CCNC: 18 U/L — SIGNIFICANT CHANGE UP (ref 10–45)
ALT FLD-CCNC: 20 U/L — SIGNIFICANT CHANGE UP (ref 10–45)
ALT FLD-CCNC: 22 U/L — SIGNIFICANT CHANGE UP (ref 10–45)
ALT FLD-CCNC: 22 U/L — SIGNIFICANT CHANGE UP (ref 10–45)
ALT FLD-CCNC: 24 U/L — SIGNIFICANT CHANGE UP (ref 10–45)
ALT FLD-CCNC: 25 U/L — SIGNIFICANT CHANGE UP (ref 10–45)
ALT FLD-CCNC: 27 U/L — SIGNIFICANT CHANGE UP (ref 10–45)
ALT FLD-CCNC: 31 U/L — SIGNIFICANT CHANGE UP (ref 10–45)
ALT FLD-CCNC: 32 U/L — SIGNIFICANT CHANGE UP (ref 10–45)
ALT FLD-CCNC: 42 U/L — SIGNIFICANT CHANGE UP (ref 10–45)
ALT FLD-CCNC: 43 U/L — SIGNIFICANT CHANGE UP (ref 10–45)
ALT FLD-CCNC: 45 U/L — SIGNIFICANT CHANGE UP (ref 10–45)
ALT FLD-CCNC: 47 U/L — HIGH (ref 10–45)
ALT FLD-CCNC: 48 U/L — HIGH
ALT FLD-CCNC: 51 U/L — HIGH
ALT FLD-CCNC: 52 U/L — HIGH (ref 10–45)
ALT FLD-CCNC: 54 U/L — HIGH (ref 10–45)
ALT FLD-CCNC: 55 U/L — HIGH
ALT FLD-CCNC: 8 U/L — LOW (ref 10–45)
ALT FLD-CCNC: 9 U/L — LOW (ref 10–45)
ALT FLD-CCNC: 9 U/L — LOW (ref 10–45)
ALT SERPL-CCNC: 27 U/L
AMMONIA BLD-MCNC: 16 UMOL/L — SIGNIFICANT CHANGE UP (ref 11–55)
ANION GAP SERPL CALC-SCNC: 11 MMOL/L — SIGNIFICANT CHANGE UP (ref 5–17)
ANION GAP SERPL CALC-SCNC: 11 MMOL/L — SIGNIFICANT CHANGE UP (ref 5–17)
ANION GAP SERPL CALC-SCNC: 12 MMOL/L — SIGNIFICANT CHANGE UP (ref 5–17)
ANION GAP SERPL CALC-SCNC: 13 MMOL/L — SIGNIFICANT CHANGE UP (ref 5–17)
ANION GAP SERPL CALC-SCNC: 14 MMOL/L
ANION GAP SERPL CALC-SCNC: 14 MMOL/L — SIGNIFICANT CHANGE UP (ref 5–17)
ANION GAP SERPL CALC-SCNC: 15 MMOL/L — SIGNIFICANT CHANGE UP (ref 5–17)
ANION GAP SERPL CALC-SCNC: 17 MMOL/L — SIGNIFICANT CHANGE UP (ref 5–17)
ANION GAP SERPL CALC-SCNC: 19 MMOL/L — HIGH (ref 5–17)
ANION GAP SERPL CALC-SCNC: 19 MMOL/L — HIGH (ref 5–17)
ANION GAP SERPL CALC-SCNC: 20 MMOL/L — HIGH (ref 5–17)
ANION GAP SERPL CALC-SCNC: 20 MMOL/L — HIGH (ref 5–17)
ANION GAP SERPL CALC-SCNC: 23 MMOL/L — HIGH (ref 5–17)
ANION GAP SERPL CALC-SCNC: 24 MMOL/L — HIGH (ref 5–17)
ANION GAP SERPL CALC-SCNC: 8 MMOL/L — SIGNIFICANT CHANGE UP (ref 5–17)
ANION GAP SERPL CALC-SCNC: 9 MMOL/L — SIGNIFICANT CHANGE UP (ref 5–17)
ANISOCYTOSIS BLD QL: SLIGHT — SIGNIFICANT CHANGE UP
APPEARANCE UR: ABNORMAL
APPEARANCE UR: CLEAR — SIGNIFICANT CHANGE UP
APTT BLD: 25.1 SEC — LOW (ref 27.5–35.5)
APTT BLD: 25.5 SEC — LOW (ref 27.5–35.5)
APTT BLD: 28.2 SEC — SIGNIFICANT CHANGE UP (ref 27.5–35.5)
AST SERPL-CCNC: 11 U/L — SIGNIFICANT CHANGE UP (ref 10–40)
AST SERPL-CCNC: 12 U/L — SIGNIFICANT CHANGE UP (ref 10–40)
AST SERPL-CCNC: 124 U/L — HIGH
AST SERPL-CCNC: 13 U/L — SIGNIFICANT CHANGE UP (ref 10–40)
AST SERPL-CCNC: 14 U/L — SIGNIFICANT CHANGE UP (ref 10–40)
AST SERPL-CCNC: 16 U/L — SIGNIFICANT CHANGE UP (ref 10–40)
AST SERPL-CCNC: 20 U/L — SIGNIFICANT CHANGE UP (ref 10–40)
AST SERPL-CCNC: 20 U/L — SIGNIFICANT CHANGE UP (ref 10–40)
AST SERPL-CCNC: 21 U/L
AST SERPL-CCNC: 21 U/L — SIGNIFICANT CHANGE UP (ref 10–40)
AST SERPL-CCNC: 22 U/L — SIGNIFICANT CHANGE UP (ref 10–40)
AST SERPL-CCNC: 23 U/L — SIGNIFICANT CHANGE UP (ref 10–40)
AST SERPL-CCNC: 24 U/L — SIGNIFICANT CHANGE UP (ref 10–40)
AST SERPL-CCNC: 25 U/L — SIGNIFICANT CHANGE UP (ref 10–40)
AST SERPL-CCNC: 29 U/L — SIGNIFICANT CHANGE UP (ref 10–40)
AST SERPL-CCNC: 36 U/L — SIGNIFICANT CHANGE UP (ref 10–40)
AST SERPL-CCNC: 38 U/L — SIGNIFICANT CHANGE UP
AST SERPL-CCNC: 42 U/L — HIGH
AST SERPL-CCNC: 43 U/L — HIGH
AST SERPL-CCNC: 6 U/L — LOW (ref 10–40)
AST SERPL-CCNC: 7 U/L — LOW (ref 10–40)
B PERT IGG+IGM PNL SER: ABNORMAL
BACTERIA # UR AUTO: ABNORMAL
BACTERIA # UR AUTO: NEGATIVE — SIGNIFICANT CHANGE UP
BASE EXCESS BLDV CALC-SCNC: -14.9 MMOL/L — LOW (ref -2–3)
BASE EXCESS BLDV CALC-SCNC: -3.7 MMOL/L — LOW (ref -2–3)
BASE EXCESS BLDV CALC-SCNC: -4.7 MMOL/L — LOW (ref -2–3)
BASE EXCESS BLDV CALC-SCNC: -6.3 MMOL/L — LOW (ref -2–3)
BASE EXCESS BLDV CALC-SCNC: -6.9 MMOL/L — LOW (ref -2–3)
BASE EXCESS BLDV CALC-SCNC: -9.2 MMOL/L — LOW (ref -2–3)
BASOPHILS # BLD AUTO: 0 K/UL — SIGNIFICANT CHANGE UP (ref 0–0.2)
BASOPHILS # BLD AUTO: 0.03 K/UL — SIGNIFICANT CHANGE UP (ref 0–0.2)
BASOPHILS # BLD AUTO: 0.04 K/UL — SIGNIFICANT CHANGE UP (ref 0–0.2)
BASOPHILS # BLD AUTO: 0.05 K/UL — SIGNIFICANT CHANGE UP (ref 0–0.2)
BASOPHILS # BLD AUTO: 0.06 K/UL — SIGNIFICANT CHANGE UP (ref 0–0.2)
BASOPHILS # BLD AUTO: 0.17 K/UL
BASOPHILS NFR BLD AUTO: 0 % — SIGNIFICANT CHANGE UP (ref 0–2)
BASOPHILS NFR BLD AUTO: 0.3 % — SIGNIFICANT CHANGE UP (ref 0–2)
BASOPHILS NFR BLD AUTO: 0.4 % — SIGNIFICANT CHANGE UP (ref 0–2)
BASOPHILS NFR BLD AUTO: 0.5 % — SIGNIFICANT CHANGE UP (ref 0–2)
BASOPHILS NFR BLD AUTO: 0.6 % — SIGNIFICANT CHANGE UP (ref 0–2)
BASOPHILS NFR BLD AUTO: 0.7 % — SIGNIFICANT CHANGE UP (ref 0–2)
BASOPHILS NFR BLD AUTO: 0.8 % — SIGNIFICANT CHANGE UP (ref 0–2)
BASOPHILS NFR BLD AUTO: 0.8 % — SIGNIFICANT CHANGE UP (ref 0–2)
BASOPHILS NFR BLD AUTO: 1.8 %
BILIRUB SERPL-MCNC: 0.1 MG/DL — LOW (ref 0.2–1.2)
BILIRUB SERPL-MCNC: 0.2 MG/DL — SIGNIFICANT CHANGE UP (ref 0.2–1.2)
BILIRUB SERPL-MCNC: 0.3 MG/DL — SIGNIFICANT CHANGE UP (ref 0.2–1.2)
BILIRUB SERPL-MCNC: 0.3 MG/DL — SIGNIFICANT CHANGE UP (ref 0.2–1.2)
BILIRUB SERPL-MCNC: 0.4 MG/DL — SIGNIFICANT CHANGE UP (ref 0.2–1.2)
BILIRUB SERPL-MCNC: 0.4 MG/DL — SIGNIFICANT CHANGE UP (ref 0.4–2)
BILIRUB SERPL-MCNC: 0.5 MG/DL — SIGNIFICANT CHANGE UP (ref 0.2–1.2)
BILIRUB SERPL-MCNC: 0.5 MG/DL — SIGNIFICANT CHANGE UP (ref 0.4–2)
BILIRUB SERPL-MCNC: 0.6 MG/DL
BILIRUB SERPL-MCNC: 0.7 MG/DL — SIGNIFICANT CHANGE UP (ref 0.2–1.2)
BILIRUB UR-MCNC: NEGATIVE — SIGNIFICANT CHANGE UP
BLD GP AB SCN SERPL QL: NEGATIVE — SIGNIFICANT CHANGE UP
BUN SERPL-MCNC: 106 MG/DL — HIGH (ref 8–20)
BUN SERPL-MCNC: 107.7 MG/DL — HIGH (ref 8–20)
BUN SERPL-MCNC: 109.1 MG/DL — HIGH (ref 8–20)
BUN SERPL-MCNC: 110.3 MG/DL — HIGH (ref 8–20)
BUN SERPL-MCNC: 111.8 MG/DL — HIGH (ref 8–20)
BUN SERPL-MCNC: 116.8 MG/DL — HIGH (ref 8–20)
BUN SERPL-MCNC: 35 MG/DL
BUN SERPL-MCNC: 35 MG/DL — HIGH (ref 7–23)
BUN SERPL-MCNC: 36 MG/DL — HIGH (ref 7–23)
BUN SERPL-MCNC: 38 MG/DL — HIGH (ref 7–23)
BUN SERPL-MCNC: 38 MG/DL — HIGH (ref 7–23)
BUN SERPL-MCNC: 41 MG/DL — HIGH (ref 7–23)
BUN SERPL-MCNC: 42 MG/DL — HIGH (ref 7–23)
BUN SERPL-MCNC: 43 MG/DL — HIGH (ref 7–23)
BUN SERPL-MCNC: 43 MG/DL — HIGH (ref 7–23)
BUN SERPL-MCNC: 44 MG/DL — HIGH (ref 7–23)
BUN SERPL-MCNC: 45 MG/DL — HIGH (ref 7–23)
BUN SERPL-MCNC: 47 MG/DL — HIGH (ref 7–23)
BUN SERPL-MCNC: 48 MG/DL — HIGH (ref 7–23)
BUN SERPL-MCNC: 48 MG/DL — HIGH (ref 7–23)
BUN SERPL-MCNC: 52 MG/DL — HIGH (ref 7–23)
BUN SERPL-MCNC: 58 MG/DL — HIGH (ref 7–23)
BUN SERPL-MCNC: 59 MG/DL — HIGH (ref 7–23)
BUN SERPL-MCNC: 59 MG/DL — HIGH (ref 7–23)
BUN SERPL-MCNC: 60 MG/DL — HIGH (ref 7–23)
BUN SERPL-MCNC: 60 MG/DL — HIGH (ref 7–23)
C3 SERPL-MCNC: 95 MG/DL — SIGNIFICANT CHANGE UP (ref 81–157)
C4 SERPL-MCNC: 24 MG/DL — SIGNIFICANT CHANGE UP (ref 13–39)
CA-I SERPL-SCNC: 1.09 MMOL/L — LOW (ref 1.15–1.33)
CA-I SERPL-SCNC: 1.21 MMOL/L — SIGNIFICANT CHANGE UP (ref 1.15–1.33)
CA-I SERPL-SCNC: 1.21 MMOL/L — SIGNIFICANT CHANGE UP (ref 1.15–1.33)
CA-I SERPL-SCNC: 1.23 MMOL/L — SIGNIFICANT CHANGE UP (ref 1.15–1.33)
CA-I SERPL-SCNC: 1.26 MMOL/L — SIGNIFICANT CHANGE UP (ref 1.15–1.33)
CA-I SERPL-SCNC: 1.29 MMOL/L — SIGNIFICANT CHANGE UP (ref 1.15–1.33)
CALCIUM SERPL-MCNC: 8.1 MG/DL — LOW (ref 8.4–10.5)
CALCIUM SERPL-MCNC: 8.2 MG/DL — LOW (ref 8.4–10.5)
CALCIUM SERPL-MCNC: 8.3 MG/DL — LOW (ref 8.4–10.5)
CALCIUM SERPL-MCNC: 8.5 MG/DL — SIGNIFICANT CHANGE UP (ref 8.4–10.5)
CALCIUM SERPL-MCNC: 8.7 MG/DL — SIGNIFICANT CHANGE UP (ref 8.4–10.5)
CALCIUM SERPL-MCNC: 8.8 MG/DL — SIGNIFICANT CHANGE UP (ref 8.4–10.5)
CALCIUM SERPL-MCNC: 8.8 MG/DL — SIGNIFICANT CHANGE UP (ref 8.4–10.5)
CALCIUM SERPL-MCNC: 8.9 MG/DL — SIGNIFICANT CHANGE UP (ref 8.4–10.5)
CALCIUM SERPL-MCNC: 9 MG/DL
CALCIUM SERPL-MCNC: 9 MG/DL — SIGNIFICANT CHANGE UP (ref 8.4–10.5)
CALCIUM SERPL-MCNC: 9 MG/DL — SIGNIFICANT CHANGE UP (ref 8.4–10.5)
CALCIUM SERPL-MCNC: 9.1 MG/DL — SIGNIFICANT CHANGE UP (ref 8.4–10.5)
CALCIUM SERPL-MCNC: 9.2 MG/DL — SIGNIFICANT CHANGE UP (ref 8.4–10.5)
CALCIUM SERPL-MCNC: 9.3 MG/DL — SIGNIFICANT CHANGE UP (ref 8.4–10.5)
CALCIUM SERPL-MCNC: 9.4 MG/DL — SIGNIFICANT CHANGE UP (ref 8.4–10.5)
CALCIUM SERPL-MCNC: 9.5 MG/DL — SIGNIFICANT CHANGE UP (ref 8.4–10.5)
CALCIUM SERPL-MCNC: 9.6 MG/DL — SIGNIFICANT CHANGE UP (ref 8.4–10.5)
CALCIUM SERPL-MCNC: 9.6 MG/DL — SIGNIFICANT CHANGE UP (ref 8.4–10.5)
CALCIUM SERPL-MCNC: 9.7 MG/DL — SIGNIFICANT CHANGE UP (ref 8.4–10.5)
CALCIUM SERPL-MCNC: 9.8 MG/DL — SIGNIFICANT CHANGE UP (ref 8.4–10.5)
CHLORIDE BLDV-SCNC: 100 MMOL/L — SIGNIFICANT CHANGE UP (ref 96–108)
CHLORIDE BLDV-SCNC: 102 MMOL/L — SIGNIFICANT CHANGE UP (ref 96–108)
CHLORIDE BLDV-SCNC: 103 MMOL/L — SIGNIFICANT CHANGE UP (ref 96–108)
CHLORIDE BLDV-SCNC: 104 MMOL/L — SIGNIFICANT CHANGE UP (ref 96–108)
CHLORIDE BLDV-SCNC: 106 MMOL/L — SIGNIFICANT CHANGE UP (ref 96–108)
CHLORIDE BLDV-SCNC: 106 MMOL/L — SIGNIFICANT CHANGE UP (ref 96–108)
CHLORIDE SERPL-SCNC: 100 MMOL/L — SIGNIFICANT CHANGE UP (ref 96–108)
CHLORIDE SERPL-SCNC: 101 MMOL/L — SIGNIFICANT CHANGE UP (ref 96–108)
CHLORIDE SERPL-SCNC: 102 MMOL/L
CHLORIDE SERPL-SCNC: 102 MMOL/L — SIGNIFICANT CHANGE UP (ref 96–108)
CHLORIDE SERPL-SCNC: 103 MMOL/L — SIGNIFICANT CHANGE UP (ref 96–108)
CHLORIDE SERPL-SCNC: 104 MMOL/L — SIGNIFICANT CHANGE UP (ref 96–108)
CHLORIDE SERPL-SCNC: 104 MMOL/L — SIGNIFICANT CHANGE UP (ref 96–108)
CHLORIDE SERPL-SCNC: 105 MMOL/L — SIGNIFICANT CHANGE UP (ref 96–108)
CHLORIDE SERPL-SCNC: 106 MMOL/L — SIGNIFICANT CHANGE UP (ref 96–108)
CHLORIDE SERPL-SCNC: 107 MMOL/L — SIGNIFICANT CHANGE UP (ref 96–108)
CHLORIDE SERPL-SCNC: 96 MMOL/L — SIGNIFICANT CHANGE UP (ref 96–108)
CHLORIDE SERPL-SCNC: 96 MMOL/L — SIGNIFICANT CHANGE UP (ref 96–108)
CHLORIDE SERPL-SCNC: 97 MMOL/L — SIGNIFICANT CHANGE UP (ref 96–108)
CHLORIDE SERPL-SCNC: 97 MMOL/L — SIGNIFICANT CHANGE UP (ref 96–108)
CHLORIDE SERPL-SCNC: 98 MMOL/L — SIGNIFICANT CHANGE UP (ref 96–108)
CHLORIDE UR-SCNC: 32 MMOL/L — SIGNIFICANT CHANGE UP
CHOLEST SERPL-MCNC: 160 MG/DL — SIGNIFICANT CHANGE UP
CHOLEST SERPL-MCNC: 209 MG/DL
CK SERPL-CCNC: 24 U/L — LOW (ref 30–200)
CMV DNA CSF QL NAA+PROBE: SIGNIFICANT CHANGE UP
CMV DNA CSF QL NAA+PROBE: SIGNIFICANT CHANGE UP IU/ML
CMV DNA SPEC NAA+PROBE-LOG#: SIGNIFICANT CHANGE UP LOG10IU/ML
CMV DNA SPEC NAA+PROBE-LOG#: SIGNIFICANT CHANGE UP LOG10IU/ML
CMV DNA SPEC QL NAA+PROBE: NOT DETECTED IU/ML
CMVPCR LOG: NOT DETECTED LOG10IU/ML
CO2 BLDV-SCNC: 19 MMOL/L — LOW (ref 22–26)
CO2 BLDV-SCNC: 20 MMOL/L — LOW (ref 22–26)
CO2 BLDV-SCNC: 20 MMOL/L — LOW (ref 22–26)
CO2 BLDV-SCNC: 22 MMOL/L — SIGNIFICANT CHANGE UP (ref 22–26)
CO2 BLDV-SCNC: 23 MMOL/L — SIGNIFICANT CHANGE UP (ref 22–26)
CO2 SERPL-SCNC: 12 MMOL/L — LOW (ref 22–29)
CO2 SERPL-SCNC: 13 MMOL/L — LOW (ref 22–29)
CO2 SERPL-SCNC: 13 MMOL/L — LOW (ref 22–29)
CO2 SERPL-SCNC: 14 MMOL/L — LOW (ref 22–29)
CO2 SERPL-SCNC: 15 MMOL/L — LOW (ref 22–31)
CO2 SERPL-SCNC: 16 MMOL/L — LOW (ref 22–31)
CO2 SERPL-SCNC: 17 MMOL/L — LOW (ref 22–31)
CO2 SERPL-SCNC: 18 MMOL/L — LOW (ref 22–31)
CO2 SERPL-SCNC: 19 MMOL/L
CO2 SERPL-SCNC: 19 MMOL/L — LOW (ref 22–31)
CO2 SERPL-SCNC: 20 MMOL/L — LOW (ref 22–31)
CO2 SERPL-SCNC: 20 MMOL/L — LOW (ref 22–31)
CO2 SERPL-SCNC: 21 MMOL/L — LOW (ref 22–31)
CO2 SERPL-SCNC: 22 MMOL/L — SIGNIFICANT CHANGE UP (ref 22–31)
CO2 SERPL-SCNC: 22 MMOL/L — SIGNIFICANT CHANGE UP (ref 22–31)
CO2 SERPL-SCNC: 23 MMOL/L — SIGNIFICANT CHANGE UP (ref 22–31)
COLOR FLD: YELLOW — SIGNIFICANT CHANGE UP
COLOR SPEC: ABNORMAL
COLOR SPEC: SIGNIFICANT CHANGE UP
COLOR SPEC: YELLOW — SIGNIFICANT CHANGE UP
COMMENT - URINE: SIGNIFICANT CHANGE UP
COMMENT - URINE: SIGNIFICANT CHANGE UP
CREAT ?TM UR-MCNC: 117 MG/DL — SIGNIFICANT CHANGE UP
CREAT ?TM UR-MCNC: 130 MG/DL — SIGNIFICANT CHANGE UP
CREAT ?TM UR-MCNC: 71 MG/DL — SIGNIFICANT CHANGE UP
CREAT SERPL-MCNC: 1.54 MG/DL — HIGH (ref 0.5–1.3)
CREAT SERPL-MCNC: 1.7 MG/DL — HIGH (ref 0.5–1.3)
CREAT SERPL-MCNC: 1.73 MG/DL — HIGH (ref 0.5–1.3)
CREAT SERPL-MCNC: 1.81 MG/DL — HIGH (ref 0.5–1.3)
CREAT SERPL-MCNC: 1.81 MG/DL — HIGH (ref 0.5–1.3)
CREAT SERPL-MCNC: 1.82 MG/DL — HIGH (ref 0.5–1.3)
CREAT SERPL-MCNC: 1.88 MG/DL — HIGH (ref 0.5–1.3)
CREAT SERPL-MCNC: 1.9 MG/DL — HIGH (ref 0.5–1.3)
CREAT SERPL-MCNC: 1.94 MG/DL — HIGH (ref 0.5–1.3)
CREAT SERPL-MCNC: 1.95 MG/DL
CREAT SERPL-MCNC: 1.99 MG/DL — HIGH (ref 0.5–1.3)
CREAT SERPL-MCNC: 2.01 MG/DL — HIGH (ref 0.5–1.3)
CREAT SERPL-MCNC: 2.03 MG/DL — HIGH (ref 0.5–1.3)
CREAT SERPL-MCNC: 2.03 MG/DL — HIGH (ref 0.5–1.3)
CREAT SERPL-MCNC: 2.11 MG/DL — HIGH (ref 0.5–1.3)
CREAT SERPL-MCNC: 2.18 MG/DL — HIGH (ref 0.5–1.3)
CREAT SERPL-MCNC: 2.19 MG/DL — HIGH (ref 0.5–1.3)
CREAT SERPL-MCNC: 2.28 MG/DL — HIGH (ref 0.5–1.3)
CREAT SERPL-MCNC: 2.3 MG/DL — HIGH (ref 0.5–1.3)
CREAT SERPL-MCNC: 2.32 MG/DL — HIGH (ref 0.5–1.3)
CREAT SERPL-MCNC: 2.32 MG/DL — HIGH (ref 0.5–1.3)
CREAT SERPL-MCNC: 2.35 MG/DL — HIGH (ref 0.5–1.3)
CREAT SERPL-MCNC: 2.43 MG/DL — HIGH (ref 0.5–1.3)
CREAT SERPL-MCNC: 2.43 MG/DL — HIGH (ref 0.5–1.3)
CREAT SERPL-MCNC: 2.46 MG/DL — HIGH (ref 0.5–1.3)
CREAT SERPL-MCNC: 2.68 MG/DL — HIGH (ref 0.5–1.3)
CREAT SERPL-MCNC: 2.75 MG/DL — HIGH (ref 0.5–1.3)
CREAT SERPL-MCNC: 5.01 MG/DL — HIGH (ref 0.5–1.3)
CREAT SERPL-MCNC: 5.14 MG/DL — HIGH (ref 0.5–1.3)
CREAT SERPL-MCNC: 5.32 MG/DL — HIGH (ref 0.5–1.3)
CREAT SERPL-MCNC: 5.41 MG/DL — HIGH (ref 0.5–1.3)
CREAT SERPL-MCNC: 5.8 MG/DL — HIGH (ref 0.5–1.3)
CREAT SERPL-MCNC: 5.84 MG/DL — HIGH (ref 0.5–1.3)
CRP SERPL-MCNC: 338 MG/L — HIGH (ref 0–4)
CRP SERPL-MCNC: 37 MG/L — HIGH
CULTURE RESULTS: SIGNIFICANT CHANGE UP
DIFF PNL FLD: ABNORMAL
EGFR: 10 ML/MIN/1.73M2 — LOW
EGFR: 10 ML/MIN/1.73M2 — LOW
EGFR: 11 ML/MIN/1.73M2 — LOW
EGFR: 11 ML/MIN/1.73M2 — LOW
EGFR: 12 ML/MIN/1.73M2 — LOW
EGFR: 12 ML/MIN/1.73M2 — LOW
EGFR: 24 ML/MIN/1.73M2 — LOW
EGFR: 25 ML/MIN/1.73M2 — LOW
EGFR: 28 ML/MIN/1.73M2 — LOW
EGFR: 30 ML/MIN/1.73M2 — LOW
EGFR: 31 ML/MIN/1.73M2 — LOW
EGFR: 32 ML/MIN/1.73M2 — LOW
EGFR: 32 ML/MIN/1.73M2 — LOW
EGFR: 34 ML/MIN/1.73M2 — LOW
EGFR: 35 ML/MIN/1.73M2 — LOW
EGFR: 35 ML/MIN/1.73M2 — LOW
EGFR: 36 ML/MIN/1.73M2 — LOW
EGFR: 36 ML/MIN/1.73M2 — LOW
EGFR: 37 ML/MIN/1.73M2
EGFR: 37 ML/MIN/1.73M2 — LOW
EGFR: 38 ML/MIN/1.73M2 — LOW
EGFR: 39 ML/MIN/1.73M2 — LOW
EGFR: 40 ML/MIN/1.73M2 — LOW
EGFR: 43 ML/MIN/1.73M2 — LOW
EGFR: 44 ML/MIN/1.73M2 — LOW
EGFR: 49 ML/MIN/1.73M2 — LOW
EOSINOPHIL # BLD AUTO: 0 K/UL — SIGNIFICANT CHANGE UP (ref 0–0.5)
EOSINOPHIL # BLD AUTO: 0.01 K/UL — SIGNIFICANT CHANGE UP (ref 0–0.5)
EOSINOPHIL # BLD AUTO: 0.01 K/UL — SIGNIFICANT CHANGE UP (ref 0–0.5)
EOSINOPHIL # BLD AUTO: 0.02 K/UL — SIGNIFICANT CHANGE UP (ref 0–0.5)
EOSINOPHIL # BLD AUTO: 0.02 K/UL — SIGNIFICANT CHANGE UP (ref 0–0.5)
EOSINOPHIL # BLD AUTO: 0.04 K/UL — SIGNIFICANT CHANGE UP (ref 0–0.5)
EOSINOPHIL # BLD AUTO: 0.04 K/UL — SIGNIFICANT CHANGE UP (ref 0–0.5)
EOSINOPHIL # BLD AUTO: 0.06 K/UL — SIGNIFICANT CHANGE UP (ref 0–0.5)
EOSINOPHIL # BLD AUTO: 0.07 K/UL — SIGNIFICANT CHANGE UP (ref 0–0.5)
EOSINOPHIL # BLD AUTO: 0.08 K/UL — SIGNIFICANT CHANGE UP (ref 0–0.5)
EOSINOPHIL # BLD AUTO: 0.08 K/UL — SIGNIFICANT CHANGE UP (ref 0–0.5)
EOSINOPHIL # BLD AUTO: 0.09 K/UL — SIGNIFICANT CHANGE UP (ref 0–0.5)
EOSINOPHIL # BLD AUTO: 0.17 K/UL
EOSINOPHIL NFR BLD AUTO: 0 % — SIGNIFICANT CHANGE UP (ref 0–6)
EOSINOPHIL NFR BLD AUTO: 0.1 % — SIGNIFICANT CHANGE UP (ref 0–6)
EOSINOPHIL NFR BLD AUTO: 0.1 % — SIGNIFICANT CHANGE UP (ref 0–6)
EOSINOPHIL NFR BLD AUTO: 0.2 % — SIGNIFICANT CHANGE UP (ref 0–6)
EOSINOPHIL NFR BLD AUTO: 0.3 % — SIGNIFICANT CHANGE UP (ref 0–6)
EOSINOPHIL NFR BLD AUTO: 0.5 % — SIGNIFICANT CHANGE UP (ref 0–6)
EOSINOPHIL NFR BLD AUTO: 0.5 % — SIGNIFICANT CHANGE UP (ref 0–6)
EOSINOPHIL NFR BLD AUTO: 0.8 % — SIGNIFICANT CHANGE UP (ref 0–6)
EOSINOPHIL NFR BLD AUTO: 0.8 % — SIGNIFICANT CHANGE UP (ref 0–6)
EOSINOPHIL NFR BLD AUTO: 0.9 % — SIGNIFICANT CHANGE UP (ref 0–6)
EOSINOPHIL NFR BLD AUTO: 1 % — SIGNIFICANT CHANGE UP (ref 0–6)
EOSINOPHIL NFR BLD AUTO: 1.1 % — SIGNIFICANT CHANGE UP (ref 0–6)
EOSINOPHIL NFR BLD AUTO: 1.1 % — SIGNIFICANT CHANGE UP (ref 0–6)
EOSINOPHIL NFR BLD AUTO: 1.8 %
EPI CELLS # UR: 0 /HPF — SIGNIFICANT CHANGE UP
EPI CELLS # UR: 1 — SIGNIFICANT CHANGE UP
EPI CELLS # UR: SIGNIFICANT CHANGE UP
ERYTHROCYTE [SEDIMENTATION RATE] IN BLOOD: 102 MM/HR — HIGH (ref 0–20)
ERYTHROCYTE [SEDIMENTATION RATE] IN BLOOD: 108 MM/HR — HIGH (ref 0–20)
ERYTHROCYTE [SEDIMENTATION RATE] IN BLOOD: 120 MM/HR — HIGH (ref 0–20)
ERYTHROCYTE [SEDIMENTATION RATE] IN BLOOD: 97 MM/HR — HIGH (ref 0–20)
ESTIMATED AVERAGE GLUCOSE: 148 MG/DL — HIGH (ref 68–114)
ESTIMATED AVERAGE GLUCOSE: 166 MG/DL — HIGH (ref 68–114)
ESTIMATED AVERAGE GLUCOSE: 194 MG/DL — HIGH (ref 68–114)
FERRITIN SERPL-MCNC: 901 NG/ML — HIGH (ref 30–400)
FLUAV AG NPH QL: SIGNIFICANT CHANGE UP
FLUBV AG NPH QL: SIGNIFICANT CHANGE UP
FLUID INTAKE SUBSTANCE CLASS: SIGNIFICANT CHANGE UP
FOLATE SERPL-MCNC: 19 NG/ML — SIGNIFICANT CHANGE UP
GAS PNL BLDV: 128 MMOL/L — LOW (ref 136–145)
GAS PNL BLDV: 129 MMOL/L — LOW (ref 136–145)
GAS PNL BLDV: 129 MMOL/L — LOW (ref 136–145)
GAS PNL BLDV: 131 MMOL/L — LOW (ref 136–145)
GAS PNL BLDV: 132 MMOL/L — LOW (ref 136–145)
GAS PNL BLDV: 134 MMOL/L — LOW (ref 136–145)
GAS PNL BLDV: SIGNIFICANT CHANGE UP
GI PCR PANEL: SIGNIFICANT CHANGE UP
GLUCOSE BLDC GLUCOMTR-MCNC: 107 MG/DL — HIGH (ref 70–99)
GLUCOSE BLDC GLUCOMTR-MCNC: 111 MG/DL — HIGH (ref 70–99)
GLUCOSE BLDC GLUCOMTR-MCNC: 112 MG/DL — HIGH (ref 70–99)
GLUCOSE BLDC GLUCOMTR-MCNC: 115 MG/DL — HIGH (ref 70–99)
GLUCOSE BLDC GLUCOMTR-MCNC: 116 MG/DL — HIGH (ref 70–99)
GLUCOSE BLDC GLUCOMTR-MCNC: 118 MG/DL — HIGH (ref 70–99)
GLUCOSE BLDC GLUCOMTR-MCNC: 123 MG/DL — HIGH (ref 70–99)
GLUCOSE BLDC GLUCOMTR-MCNC: 124 MG/DL — HIGH (ref 70–99)
GLUCOSE BLDC GLUCOMTR-MCNC: 124 MG/DL — HIGH (ref 70–99)
GLUCOSE BLDC GLUCOMTR-MCNC: 125 MG/DL — HIGH (ref 70–99)
GLUCOSE BLDC GLUCOMTR-MCNC: 126 MG/DL — HIGH (ref 70–99)
GLUCOSE BLDC GLUCOMTR-MCNC: 127 MG/DL — HIGH (ref 70–99)
GLUCOSE BLDC GLUCOMTR-MCNC: 127 MG/DL — HIGH (ref 70–99)
GLUCOSE BLDC GLUCOMTR-MCNC: 129 MG/DL — HIGH (ref 70–99)
GLUCOSE BLDC GLUCOMTR-MCNC: 131 MG/DL — HIGH (ref 70–99)
GLUCOSE BLDC GLUCOMTR-MCNC: 133 MG/DL — HIGH (ref 70–99)
GLUCOSE BLDC GLUCOMTR-MCNC: 134 MG/DL — HIGH (ref 70–99)
GLUCOSE BLDC GLUCOMTR-MCNC: 136 MG/DL — HIGH (ref 70–99)
GLUCOSE BLDC GLUCOMTR-MCNC: 136 MG/DL — HIGH (ref 70–99)
GLUCOSE BLDC GLUCOMTR-MCNC: 138 MG/DL — HIGH (ref 70–99)
GLUCOSE BLDC GLUCOMTR-MCNC: 138 MG/DL — HIGH (ref 70–99)
GLUCOSE BLDC GLUCOMTR-MCNC: 139 MG/DL — HIGH (ref 70–99)
GLUCOSE BLDC GLUCOMTR-MCNC: 146 MG/DL — HIGH (ref 70–99)
GLUCOSE BLDC GLUCOMTR-MCNC: 148 MG/DL — HIGH (ref 70–99)
GLUCOSE BLDC GLUCOMTR-MCNC: 152 MG/DL — HIGH (ref 70–99)
GLUCOSE BLDC GLUCOMTR-MCNC: 152 MG/DL — HIGH (ref 70–99)
GLUCOSE BLDC GLUCOMTR-MCNC: 153 MG/DL — HIGH (ref 70–99)
GLUCOSE BLDC GLUCOMTR-MCNC: 156 MG/DL — HIGH (ref 70–99)
GLUCOSE BLDC GLUCOMTR-MCNC: 157 MG/DL — HIGH (ref 70–99)
GLUCOSE BLDC GLUCOMTR-MCNC: 158 MG/DL — HIGH (ref 70–99)
GLUCOSE BLDC GLUCOMTR-MCNC: 163 MG/DL — HIGH (ref 70–99)
GLUCOSE BLDC GLUCOMTR-MCNC: 168 MG/DL — HIGH (ref 70–99)
GLUCOSE BLDC GLUCOMTR-MCNC: 170 MG/DL — HIGH (ref 70–99)
GLUCOSE BLDC GLUCOMTR-MCNC: 171 MG/DL — HIGH (ref 70–99)
GLUCOSE BLDC GLUCOMTR-MCNC: 175 MG/DL — HIGH (ref 70–99)
GLUCOSE BLDC GLUCOMTR-MCNC: 176 MG/DL — HIGH (ref 70–99)
GLUCOSE BLDC GLUCOMTR-MCNC: 177 MG/DL — HIGH (ref 70–99)
GLUCOSE BLDC GLUCOMTR-MCNC: 177 MG/DL — HIGH (ref 70–99)
GLUCOSE BLDC GLUCOMTR-MCNC: 179 MG/DL — HIGH (ref 70–99)
GLUCOSE BLDC GLUCOMTR-MCNC: 181 MG/DL — HIGH (ref 70–99)
GLUCOSE BLDC GLUCOMTR-MCNC: 182 MG/DL — HIGH (ref 70–99)
GLUCOSE BLDC GLUCOMTR-MCNC: 184 MG/DL — HIGH (ref 70–99)
GLUCOSE BLDC GLUCOMTR-MCNC: 190 MG/DL — HIGH (ref 70–99)
GLUCOSE BLDC GLUCOMTR-MCNC: 191 MG/DL — HIGH (ref 70–99)
GLUCOSE BLDC GLUCOMTR-MCNC: 194 MG/DL — HIGH (ref 70–99)
GLUCOSE BLDC GLUCOMTR-MCNC: 194 MG/DL — HIGH (ref 70–99)
GLUCOSE BLDC GLUCOMTR-MCNC: 195 MG/DL — HIGH (ref 70–99)
GLUCOSE BLDC GLUCOMTR-MCNC: 201 MG/DL — HIGH (ref 70–99)
GLUCOSE BLDC GLUCOMTR-MCNC: 202 MG/DL — HIGH (ref 70–99)
GLUCOSE BLDC GLUCOMTR-MCNC: 203 MG/DL — HIGH (ref 70–99)
GLUCOSE BLDC GLUCOMTR-MCNC: 203 MG/DL — HIGH (ref 70–99)
GLUCOSE BLDC GLUCOMTR-MCNC: 211 MG/DL — HIGH (ref 70–99)
GLUCOSE BLDC GLUCOMTR-MCNC: 212 MG/DL — HIGH (ref 70–99)
GLUCOSE BLDC GLUCOMTR-MCNC: 216 MG/DL — HIGH (ref 70–99)
GLUCOSE BLDC GLUCOMTR-MCNC: 216 MG/DL — HIGH (ref 70–99)
GLUCOSE BLDC GLUCOMTR-MCNC: 220 MG/DL — HIGH (ref 70–99)
GLUCOSE BLDC GLUCOMTR-MCNC: 223 MG/DL — HIGH (ref 70–99)
GLUCOSE BLDC GLUCOMTR-MCNC: 232 MG/DL — HIGH (ref 70–99)
GLUCOSE BLDC GLUCOMTR-MCNC: 234 MG/DL — HIGH (ref 70–99)
GLUCOSE BLDC GLUCOMTR-MCNC: 236 MG/DL — HIGH (ref 70–99)
GLUCOSE BLDC GLUCOMTR-MCNC: 236 MG/DL — HIGH (ref 70–99)
GLUCOSE BLDC GLUCOMTR-MCNC: 237 MG/DL — HIGH (ref 70–99)
GLUCOSE BLDC GLUCOMTR-MCNC: 238 MG/DL — HIGH (ref 70–99)
GLUCOSE BLDC GLUCOMTR-MCNC: 239 MG/DL — HIGH (ref 70–99)
GLUCOSE BLDC GLUCOMTR-MCNC: 244 MG/DL — HIGH (ref 70–99)
GLUCOSE BLDC GLUCOMTR-MCNC: 245 MG/DL — HIGH (ref 70–99)
GLUCOSE BLDC GLUCOMTR-MCNC: 247 MG/DL — HIGH (ref 70–99)
GLUCOSE BLDC GLUCOMTR-MCNC: 248 MG/DL — HIGH (ref 70–99)
GLUCOSE BLDC GLUCOMTR-MCNC: 248 MG/DL — HIGH (ref 70–99)
GLUCOSE BLDC GLUCOMTR-MCNC: 253 MG/DL — HIGH (ref 70–99)
GLUCOSE BLDC GLUCOMTR-MCNC: 255 MG/DL — HIGH (ref 70–99)
GLUCOSE BLDC GLUCOMTR-MCNC: 265 MG/DL — HIGH (ref 70–99)
GLUCOSE BLDC GLUCOMTR-MCNC: 267 MG/DL — HIGH (ref 70–99)
GLUCOSE BLDC GLUCOMTR-MCNC: 268 MG/DL — HIGH (ref 70–99)
GLUCOSE BLDC GLUCOMTR-MCNC: 268 MG/DL — HIGH (ref 70–99)
GLUCOSE BLDC GLUCOMTR-MCNC: 275 MG/DL — HIGH (ref 70–99)
GLUCOSE BLDC GLUCOMTR-MCNC: 276 MG/DL — HIGH (ref 70–99)
GLUCOSE BLDC GLUCOMTR-MCNC: 276 MG/DL — HIGH (ref 70–99)
GLUCOSE BLDC GLUCOMTR-MCNC: 277 MG/DL — HIGH (ref 70–99)
GLUCOSE BLDC GLUCOMTR-MCNC: 278 MG/DL — HIGH (ref 70–99)
GLUCOSE BLDC GLUCOMTR-MCNC: 280 MG/DL — HIGH (ref 70–99)
GLUCOSE BLDC GLUCOMTR-MCNC: 282 MG/DL — HIGH (ref 70–99)
GLUCOSE BLDC GLUCOMTR-MCNC: 283 MG/DL — HIGH (ref 70–99)
GLUCOSE BLDC GLUCOMTR-MCNC: 283 MG/DL — HIGH (ref 70–99)
GLUCOSE BLDC GLUCOMTR-MCNC: 285 MG/DL — HIGH (ref 70–99)
GLUCOSE BLDC GLUCOMTR-MCNC: 286 MG/DL — HIGH (ref 70–99)
GLUCOSE BLDC GLUCOMTR-MCNC: 292 MG/DL — HIGH (ref 70–99)
GLUCOSE BLDC GLUCOMTR-MCNC: 292 MG/DL — HIGH (ref 70–99)
GLUCOSE BLDC GLUCOMTR-MCNC: 294 MG/DL — HIGH (ref 70–99)
GLUCOSE BLDC GLUCOMTR-MCNC: 310 MG/DL — HIGH (ref 70–99)
GLUCOSE BLDC GLUCOMTR-MCNC: 310 MG/DL — HIGH (ref 70–99)
GLUCOSE BLDC GLUCOMTR-MCNC: 314 MG/DL — HIGH (ref 70–99)
GLUCOSE BLDC GLUCOMTR-MCNC: 319 MG/DL — HIGH (ref 70–99)
GLUCOSE BLDC GLUCOMTR-MCNC: 333 MG/DL — HIGH (ref 70–99)
GLUCOSE BLDC GLUCOMTR-MCNC: 334 MG/DL — HIGH (ref 70–99)
GLUCOSE BLDC GLUCOMTR-MCNC: 342 MG/DL — HIGH (ref 70–99)
GLUCOSE BLDC GLUCOMTR-MCNC: 344 MG/DL — HIGH (ref 70–99)
GLUCOSE BLDC GLUCOMTR-MCNC: 346 MG/DL — HIGH (ref 70–99)
GLUCOSE BLDC GLUCOMTR-MCNC: 348 MG/DL — HIGH (ref 70–99)
GLUCOSE BLDC GLUCOMTR-MCNC: 389 MG/DL — HIGH (ref 70–99)
GLUCOSE BLDC GLUCOMTR-MCNC: 395 MG/DL — HIGH (ref 70–99)
GLUCOSE BLDC GLUCOMTR-MCNC: 423 MG/DL — HIGH (ref 70–99)
GLUCOSE BLDC GLUCOMTR-MCNC: 432 MG/DL — HIGH (ref 70–99)
GLUCOSE BLDC GLUCOMTR-MCNC: 93 MG/DL — SIGNIFICANT CHANGE UP (ref 70–99)
GLUCOSE BLDC GLUCOMTR-MCNC: 96 MG/DL — SIGNIFICANT CHANGE UP (ref 70–99)
GLUCOSE BLDV-MCNC: 157 MG/DL — HIGH (ref 70–99)
GLUCOSE BLDV-MCNC: 196 MG/DL — HIGH (ref 70–99)
GLUCOSE BLDV-MCNC: 210 MG/DL — HIGH (ref 70–99)
GLUCOSE BLDV-MCNC: 322 MG/DL — HIGH (ref 70–99)
GLUCOSE BLDV-MCNC: 334 MG/DL — HIGH (ref 70–99)
GLUCOSE BLDV-MCNC: 342 MG/DL — HIGH (ref 70–99)
GLUCOSE SERPL-MCNC: 102 MG/DL — HIGH (ref 70–99)
GLUCOSE SERPL-MCNC: 103 MG/DL — HIGH (ref 70–99)
GLUCOSE SERPL-MCNC: 104 MG/DL — HIGH (ref 70–99)
GLUCOSE SERPL-MCNC: 108 MG/DL — HIGH (ref 70–99)
GLUCOSE SERPL-MCNC: 109 MG/DL — HIGH (ref 70–99)
GLUCOSE SERPL-MCNC: 110 MG/DL — HIGH (ref 70–99)
GLUCOSE SERPL-MCNC: 113 MG/DL — HIGH (ref 70–99)
GLUCOSE SERPL-MCNC: 117 MG/DL — HIGH (ref 70–99)
GLUCOSE SERPL-MCNC: 129 MG/DL — HIGH (ref 70–99)
GLUCOSE SERPL-MCNC: 131 MG/DL — HIGH (ref 70–99)
GLUCOSE SERPL-MCNC: 137 MG/DL — HIGH (ref 70–99)
GLUCOSE SERPL-MCNC: 153 MG/DL — HIGH (ref 70–99)
GLUCOSE SERPL-MCNC: 157 MG/DL — HIGH (ref 70–99)
GLUCOSE SERPL-MCNC: 157 MG/DL — HIGH (ref 70–99)
GLUCOSE SERPL-MCNC: 158 MG/DL — HIGH (ref 70–99)
GLUCOSE SERPL-MCNC: 166 MG/DL — HIGH (ref 70–99)
GLUCOSE SERPL-MCNC: 166 MG/DL — HIGH (ref 70–99)
GLUCOSE SERPL-MCNC: 170 MG/DL
GLUCOSE SERPL-MCNC: 170 MG/DL — HIGH (ref 70–99)
GLUCOSE SERPL-MCNC: 179 MG/DL — HIGH (ref 70–99)
GLUCOSE SERPL-MCNC: 188 MG/DL — HIGH (ref 70–99)
GLUCOSE SERPL-MCNC: 191 MG/DL — HIGH (ref 70–99)
GLUCOSE SERPL-MCNC: 215 MG/DL — HIGH (ref 70–99)
GLUCOSE SERPL-MCNC: 215 MG/DL — HIGH (ref 70–99)
GLUCOSE SERPL-MCNC: 218 MG/DL — HIGH (ref 70–99)
GLUCOSE SERPL-MCNC: 229 MG/DL — HIGH (ref 70–99)
GLUCOSE SERPL-MCNC: 229 MG/DL — HIGH (ref 70–99)
GLUCOSE SERPL-MCNC: 232 MG/DL — HIGH (ref 70–99)
GLUCOSE SERPL-MCNC: 234 MG/DL — HIGH (ref 70–99)
GLUCOSE SERPL-MCNC: 249 MG/DL — HIGH (ref 70–99)
GLUCOSE SERPL-MCNC: 281 MG/DL — HIGH (ref 70–99)
GLUCOSE SERPL-MCNC: 303 MG/DL — HIGH (ref 70–99)
GLUCOSE SERPL-MCNC: 335 MG/DL — HIGH (ref 70–99)
GLUCOSE UR QL: 100 MG/DL
GLUCOSE UR QL: ABNORMAL
GLUCOSE UR QL: NEGATIVE — SIGNIFICANT CHANGE UP
GP B STREP DNA BLD POS QL NAA+NON-PROBE: SIGNIFICANT CHANGE UP
GRAM STN FLD: SIGNIFICANT CHANGE UP
HCO3 BLDV-SCNC: 12 MMOL/L — LOW (ref 22–29)
HCO3 BLDV-SCNC: 18 MMOL/L — LOW (ref 22–29)
HCO3 BLDV-SCNC: 18 MMOL/L — LOW (ref 22–29)
HCO3 BLDV-SCNC: 19 MMOL/L — LOW (ref 22–29)
HCO3 BLDV-SCNC: 20 MMOL/L — LOW (ref 22–29)
HCO3 BLDV-SCNC: 22 MMOL/L — SIGNIFICANT CHANGE UP (ref 22–29)
HCT VFR BLD CALC: 25.9 % — LOW (ref 39–50)
HCT VFR BLD CALC: 26.4 % — LOW (ref 39–50)
HCT VFR BLD CALC: 27 % — LOW (ref 39–50)
HCT VFR BLD CALC: 27.8 % — LOW (ref 39–50)
HCT VFR BLD CALC: 28 % — LOW (ref 39–50)
HCT VFR BLD CALC: 28.3 % — LOW (ref 39–50)
HCT VFR BLD CALC: 28.6 % — LOW (ref 39–50)
HCT VFR BLD CALC: 28.9 % — LOW (ref 39–50)
HCT VFR BLD CALC: 29.1 % — LOW (ref 39–50)
HCT VFR BLD CALC: 29.3 % — LOW (ref 39–50)
HCT VFR BLD CALC: 30.2 % — LOW (ref 39–50)
HCT VFR BLD CALC: 30.5 % — LOW (ref 39–50)
HCT VFR BLD CALC: 30.8 % — LOW (ref 39–50)
HCT VFR BLD CALC: 30.9 % — LOW (ref 39–50)
HCT VFR BLD CALC: 30.9 % — LOW (ref 39–50)
HCT VFR BLD CALC: 31.2 % — LOW (ref 39–50)
HCT VFR BLD CALC: 31.3 % — LOW (ref 39–50)
HCT VFR BLD CALC: 31.4 % — LOW (ref 39–50)
HCT VFR BLD CALC: 31.7 % — LOW (ref 39–50)
HCT VFR BLD CALC: 32 % — LOW (ref 39–50)
HCT VFR BLD CALC: 32.6 % — LOW (ref 39–50)
HCT VFR BLD CALC: 32.9 % — LOW (ref 39–50)
HCT VFR BLD CALC: 32.9 % — LOW (ref 39–50)
HCT VFR BLD CALC: 33.8 % — LOW (ref 39–50)
HCT VFR BLD CALC: 35 % — LOW (ref 39–50)
HCT VFR BLD CALC: 35.7 %
HCT VFR BLDA CALC: 29 % — LOW (ref 39–51)
HCT VFR BLDA CALC: 29 % — LOW (ref 39–51)
HCT VFR BLDA CALC: 31 % — SIGNIFICANT CHANGE UP
HCT VFR BLDA CALC: 32 % — LOW (ref 39–51)
HCT VFR BLDA CALC: 33 % — LOW (ref 39–51)
HCT VFR BLDA CALC: 35 % — LOW (ref 39–51)
HDLC SERPL-MCNC: 30 MG/DL — LOW
HDLC SERPL-MCNC: 50 MG/DL
HGB BLD CALC-MCNC: 10.4 G/DL — LOW (ref 12.6–17.4)
HGB BLD CALC-MCNC: 10.5 G/DL — LOW (ref 12.6–17.4)
HGB BLD CALC-MCNC: 11.1 G/DL — LOW (ref 12.6–17.4)
HGB BLD CALC-MCNC: 11.5 G/DL — LOW (ref 12.6–17.4)
HGB BLD CALC-MCNC: 9.5 G/DL — LOW (ref 12.6–17.4)
HGB BLD CALC-MCNC: 9.8 G/DL — LOW (ref 12.6–17.4)
HGB BLD-MCNC: 10 G/DL — LOW (ref 13–17)
HGB BLD-MCNC: 10 G/DL — LOW (ref 13–17)
HGB BLD-MCNC: 10.1 G/DL — LOW (ref 13–17)
HGB BLD-MCNC: 10.2 G/DL — LOW (ref 13–17)
HGB BLD-MCNC: 10.3 G/DL — LOW (ref 13–17)
HGB BLD-MCNC: 10.4 G/DL — LOW (ref 13–17)
HGB BLD-MCNC: 10.4 G/DL — LOW (ref 13–17)
HGB BLD-MCNC: 10.7 G/DL — LOW (ref 13–17)
HGB BLD-MCNC: 11.1 G/DL
HGB BLD-MCNC: 11.2 G/DL — LOW (ref 13–17)
HGB BLD-MCNC: 8.3 G/DL — LOW (ref 13–17)
HGB BLD-MCNC: 8.4 G/DL — LOW (ref 13–17)
HGB BLD-MCNC: 8.4 G/DL — LOW (ref 13–17)
HGB BLD-MCNC: 8.6 G/DL — LOW (ref 13–17)
HGB BLD-MCNC: 8.9 G/DL — LOW (ref 13–17)
HGB BLD-MCNC: 9 G/DL — LOW (ref 13–17)
HGB BLD-MCNC: 9.1 G/DL — LOW (ref 13–17)
HGB BLD-MCNC: 9.2 G/DL — LOW (ref 13–17)
HGB BLD-MCNC: 9.2 G/DL — LOW (ref 13–17)
HGB BLD-MCNC: 9.5 G/DL — LOW (ref 13–17)
HGB BLD-MCNC: 9.6 G/DL — LOW (ref 13–17)
HGB BLD-MCNC: 9.6 G/DL — LOW (ref 13–17)
HGB BLD-MCNC: 9.7 G/DL — LOW (ref 13–17)
HGB BLD-MCNC: 9.8 G/DL — LOW (ref 13–17)
HGB BLD-MCNC: 9.9 G/DL — LOW (ref 13–17)
HGB BLD-MCNC: 9.9 G/DL — LOW (ref 13–17)
HOROWITZ INDEX BLDV+IHG-RTO: SIGNIFICANT CHANGE UP
HYALINE CASTS # UR AUTO: 0 /LPF — SIGNIFICANT CHANGE UP (ref 0–2)
HYALINE CASTS # UR AUTO: 1 /LPF — SIGNIFICANT CHANGE UP (ref 0–2)
HYALINE CASTS # UR AUTO: 1 /LPF — SIGNIFICANT CHANGE UP (ref 0–2)
HYALINE CASTS # UR AUTO: 4 /LPF — SIGNIFICANT CHANGE UP (ref 0–7)
IMM GRANULOCYTES NFR BLD AUTO: 0.8 % — SIGNIFICANT CHANGE UP (ref 0–0.9)
IMM GRANULOCYTES NFR BLD AUTO: 0.9 % — SIGNIFICANT CHANGE UP (ref 0–0.9)
IMM GRANULOCYTES NFR BLD AUTO: 1 % — HIGH (ref 0–0.9)
IMM GRANULOCYTES NFR BLD AUTO: 1.1 % — HIGH (ref 0–0.9)
IMM GRANULOCYTES NFR BLD AUTO: 1.1 % — HIGH (ref 0–0.9)
IMM GRANULOCYTES NFR BLD AUTO: 1.2 % — HIGH (ref 0–0.9)
IMM GRANULOCYTES NFR BLD AUTO: 1.2 % — HIGH (ref 0–0.9)
IMM GRANULOCYTES NFR BLD AUTO: 1.3 % — HIGH (ref 0–0.9)
IMM GRANULOCYTES NFR BLD AUTO: 1.7 % — HIGH (ref 0–0.9)
IMM GRANULOCYTES NFR BLD AUTO: 2 % — HIGH (ref 0–0.9)
IMM GRANULOCYTES NFR BLD AUTO: 2.7 % — HIGH (ref 0–0.9)
IMM GRANULOCYTES NFR BLD AUTO: 6.9 % — HIGH (ref 0–0.9)
INR BLD: 1.15 RATIO — SIGNIFICANT CHANGE UP (ref 0.88–1.16)
INR BLD: 1.2 RATIO — HIGH (ref 0.88–1.16)
INR BLD: 1.21 RATIO — HIGH (ref 0.88–1.16)
INR BLD: 1.4 RATIO — HIGH (ref 0.88–1.16)
INR PPP: 1.15 RATIO
IRON SATN MFR SERPL: 46 % — SIGNIFICANT CHANGE UP (ref 16–55)
IRON SATN MFR SERPL: 63 UG/DL — SIGNIFICANT CHANGE UP (ref 45–165)
KAPPA LC SER QL IFE: 12.43 MG/DL — HIGH (ref 0.33–1.94)
KAPPA/LAMBDA FREE LIGHT CHAIN RATIO, SERUM: 1.87 RATIO — HIGH (ref 0.26–1.65)
KETONES UR-MCNC: NEGATIVE — SIGNIFICANT CHANGE UP
LACTATE BLDV-MCNC: 0.9 MMOL/L — SIGNIFICANT CHANGE UP (ref 0.5–2)
LACTATE BLDV-MCNC: 1 MMOL/L — SIGNIFICANT CHANGE UP (ref 0.5–2)
LACTATE BLDV-MCNC: 1 MMOL/L — SIGNIFICANT CHANGE UP (ref 0.5–2)
LACTATE BLDV-MCNC: 1.4 MMOL/L — SIGNIFICANT CHANGE UP (ref 0.5–2)
LACTATE BLDV-MCNC: 1.4 MMOL/L — SIGNIFICANT CHANGE UP (ref 0.5–2)
LACTATE BLDV-MCNC: 2.1 MMOL/L — HIGH (ref 0.5–2)
LACTATE BLDV-MCNC: 2.9 MMOL/L — HIGH (ref 0.5–2)
LACTATE BLDV-MCNC: 3 MMOL/L — HIGH (ref 0.5–2)
LACTATE SERPL-SCNC: 2 MMOL/L — SIGNIFICANT CHANGE UP (ref 0.5–2)
LACTATE SERPL-SCNC: 2.2 MMOL/L — HIGH (ref 0.5–2)
LACTATE SERPL-SCNC: 2.3 MMOL/L — HIGH (ref 0.5–2)
LACTATE SERPL-SCNC: 2.4 MMOL/L — HIGH (ref 0.5–2)
LACTATE SERPL-SCNC: 2.4 MMOL/L — HIGH (ref 0.5–2)
LAMBDA LC SER QL IFE: 6.63 MG/DL — HIGH (ref 0.57–2.63)
LDLC SERPL CALC-MCNC: 126 MG/DL
LEUKOCYTE ESTERASE UR-ACNC: ABNORMAL
LIDOCAIN IGE QN: 13 U/L — SIGNIFICANT CHANGE UP (ref 7–60)
LIPID PNL WITH DIRECT LDL SERPL: 102 MG/DL — HIGH
LYMPHOCYTES # BLD AUTO: 0.88 K/UL — LOW (ref 1–3.3)
LYMPHOCYTES # BLD AUTO: 0.95 K/UL — LOW (ref 1–3.3)
LYMPHOCYTES # BLD AUTO: 0.98 K/UL — LOW (ref 1–3.3)
LYMPHOCYTES # BLD AUTO: 1.23 K/UL — SIGNIFICANT CHANGE UP (ref 1–3.3)
LYMPHOCYTES # BLD AUTO: 1.3 K/UL — SIGNIFICANT CHANGE UP (ref 1–3.3)
LYMPHOCYTES # BLD AUTO: 1.47 K/UL — SIGNIFICANT CHANGE UP (ref 1–3.3)
LYMPHOCYTES # BLD AUTO: 1.47 K/UL — SIGNIFICANT CHANGE UP (ref 1–3.3)
LYMPHOCYTES # BLD AUTO: 1.61 K/UL — SIGNIFICANT CHANGE UP (ref 1–3.3)
LYMPHOCYTES # BLD AUTO: 1.63 K/UL — SIGNIFICANT CHANGE UP (ref 1–3.3)
LYMPHOCYTES # BLD AUTO: 1.63 K/UL — SIGNIFICANT CHANGE UP (ref 1–3.3)
LYMPHOCYTES # BLD AUTO: 1.71 K/UL
LYMPHOCYTES # BLD AUTO: 1.84 K/UL — SIGNIFICANT CHANGE UP (ref 1–3.3)
LYMPHOCYTES # BLD AUTO: 1.87 K/UL — SIGNIFICANT CHANGE UP (ref 1–3.3)
LYMPHOCYTES # BLD AUTO: 1.98 K/UL — SIGNIFICANT CHANGE UP (ref 1–3.3)
LYMPHOCYTES # BLD AUTO: 10.3 % — LOW (ref 13–44)
LYMPHOCYTES # BLD AUTO: 11.2 % — LOW (ref 13–44)
LYMPHOCYTES # BLD AUTO: 11.5 % — LOW (ref 13–44)
LYMPHOCYTES # BLD AUTO: 15.6 % — SIGNIFICANT CHANGE UP (ref 13–44)
LYMPHOCYTES # BLD AUTO: 16.7 % — SIGNIFICANT CHANGE UP (ref 13–44)
LYMPHOCYTES # BLD AUTO: 17.2 % — SIGNIFICANT CHANGE UP (ref 13–44)
LYMPHOCYTES # BLD AUTO: 17.8 % — SIGNIFICANT CHANGE UP (ref 13–44)
LYMPHOCYTES # BLD AUTO: 19.9 % — SIGNIFICANT CHANGE UP (ref 13–44)
LYMPHOCYTES # BLD AUTO: 2.06 K/UL — SIGNIFICANT CHANGE UP (ref 1–3.3)
LYMPHOCYTES # BLD AUTO: 2.11 K/UL — SIGNIFICANT CHANGE UP (ref 1–3.3)
LYMPHOCYTES # BLD AUTO: 2.13 K/UL — SIGNIFICANT CHANGE UP (ref 1–3.3)
LYMPHOCYTES # BLD AUTO: 2.13 K/UL — SIGNIFICANT CHANGE UP (ref 1–3.3)
LYMPHOCYTES # BLD AUTO: 2.31 K/UL — SIGNIFICANT CHANGE UP (ref 1–3.3)
LYMPHOCYTES # BLD AUTO: 2.4 K/UL — SIGNIFICANT CHANGE UP (ref 1–3.3)
LYMPHOCYTES # BLD AUTO: 21.2 % — SIGNIFICANT CHANGE UP (ref 13–44)
LYMPHOCYTES # BLD AUTO: 21.7 % — SIGNIFICANT CHANGE UP (ref 13–44)
LYMPHOCYTES # BLD AUTO: 24.8 % — SIGNIFICANT CHANGE UP (ref 13–44)
LYMPHOCYTES # BLD AUTO: 25.3 % — SIGNIFICANT CHANGE UP (ref 13–44)
LYMPHOCYTES # BLD AUTO: 26 % — SIGNIFICANT CHANGE UP (ref 13–44)
LYMPHOCYTES # BLD AUTO: 27.1 % — SIGNIFICANT CHANGE UP (ref 13–44)
LYMPHOCYTES # BLD AUTO: 27.8 % — SIGNIFICANT CHANGE UP (ref 13–44)
LYMPHOCYTES # BLD AUTO: 28.7 % — SIGNIFICANT CHANGE UP (ref 13–44)
LYMPHOCYTES # BLD AUTO: 30 % — SIGNIFICANT CHANGE UP (ref 13–44)
LYMPHOCYTES # BLD AUTO: 30.4 % — SIGNIFICANT CHANGE UP (ref 13–44)
LYMPHOCYTES # BLD AUTO: 32.4 % — SIGNIFICANT CHANGE UP (ref 13–44)
LYMPHOCYTES # FLD: 4 % — SIGNIFICANT CHANGE UP
LYMPHOCYTES NFR BLD AUTO: 17.7 %
MACROCYTES BLD QL: SLIGHT — SIGNIFICANT CHANGE UP
MAGNESIUM SERPL-MCNC: 1.6 MG/DL — SIGNIFICANT CHANGE UP (ref 1.6–2.6)
MAGNESIUM SERPL-MCNC: 1.8 MG/DL — SIGNIFICANT CHANGE UP (ref 1.6–2.6)
MAGNESIUM SERPL-MCNC: 1.9 MG/DL — SIGNIFICANT CHANGE UP (ref 1.6–2.6)
MAGNESIUM SERPL-MCNC: 2 MG/DL — SIGNIFICANT CHANGE UP (ref 1.6–2.6)
MAGNESIUM SERPL-MCNC: 2.1 MG/DL — SIGNIFICANT CHANGE UP (ref 1.6–2.6)
MAGNESIUM SERPL-MCNC: 2.2 MG/DL — SIGNIFICANT CHANGE UP (ref 1.6–2.6)
MAGNESIUM SERPL-MCNC: 2.2 MG/DL — SIGNIFICANT CHANGE UP (ref 1.6–2.6)
MAGNESIUM SERPL-MCNC: 2.3 MG/DL — SIGNIFICANT CHANGE UP (ref 1.6–2.6)
MAGNESIUM SERPL-MCNC: 2.4 MG/DL — SIGNIFICANT CHANGE UP (ref 1.6–2.6)
MAGNESIUM SERPL-MCNC: 2.5 MG/DL — SIGNIFICANT CHANGE UP (ref 1.6–2.6)
MAGNESIUM SERPL-MCNC: 2.5 MG/DL — SIGNIFICANT CHANGE UP (ref 1.6–2.6)
MAN DIFF?: NORMAL
MANUAL SMEAR VERIFICATION: SIGNIFICANT CHANGE UP
MCHC RBC-ENTMCNC: 28.7 PG — SIGNIFICANT CHANGE UP (ref 27–34)
MCHC RBC-ENTMCNC: 28.7 PG — SIGNIFICANT CHANGE UP (ref 27–34)
MCHC RBC-ENTMCNC: 28.9 PG — SIGNIFICANT CHANGE UP (ref 27–34)
MCHC RBC-ENTMCNC: 28.9 PG — SIGNIFICANT CHANGE UP (ref 27–34)
MCHC RBC-ENTMCNC: 29 PG — SIGNIFICANT CHANGE UP (ref 27–34)
MCHC RBC-ENTMCNC: 29 PG — SIGNIFICANT CHANGE UP (ref 27–34)
MCHC RBC-ENTMCNC: 29.1 PG — SIGNIFICANT CHANGE UP (ref 27–34)
MCHC RBC-ENTMCNC: 29.2 PG — SIGNIFICANT CHANGE UP (ref 27–34)
MCHC RBC-ENTMCNC: 29.2 PG — SIGNIFICANT CHANGE UP (ref 27–34)
MCHC RBC-ENTMCNC: 29.3 PG — SIGNIFICANT CHANGE UP (ref 27–34)
MCHC RBC-ENTMCNC: 29.5 PG — SIGNIFICANT CHANGE UP (ref 27–34)
MCHC RBC-ENTMCNC: 29.6 PG — SIGNIFICANT CHANGE UP (ref 27–34)
MCHC RBC-ENTMCNC: 29.7 PG — SIGNIFICANT CHANGE UP (ref 27–34)
MCHC RBC-ENTMCNC: 29.7 PG — SIGNIFICANT CHANGE UP (ref 27–34)
MCHC RBC-ENTMCNC: 29.9 PG — SIGNIFICANT CHANGE UP (ref 27–34)
MCHC RBC-ENTMCNC: 29.9 PG — SIGNIFICANT CHANGE UP (ref 27–34)
MCHC RBC-ENTMCNC: 30 PG
MCHC RBC-ENTMCNC: 30.2 PG — SIGNIFICANT CHANGE UP (ref 27–34)
MCHC RBC-ENTMCNC: 30.6 PG — SIGNIFICANT CHANGE UP (ref 27–34)
MCHC RBC-ENTMCNC: 30.6 PG — SIGNIFICANT CHANGE UP (ref 27–34)
MCHC RBC-ENTMCNC: 30.7 GM/DL — LOW (ref 32–36)
MCHC RBC-ENTMCNC: 30.8 PG — SIGNIFICANT CHANGE UP (ref 27–34)
MCHC RBC-ENTMCNC: 30.9 GM/DL — LOW (ref 32–36)
MCHC RBC-ENTMCNC: 31 GM/DL — LOW (ref 32–36)
MCHC RBC-ENTMCNC: 31.1 GM/DL
MCHC RBC-ENTMCNC: 31.1 GM/DL — LOW (ref 32–36)
MCHC RBC-ENTMCNC: 31.2 GM/DL — LOW (ref 32–36)
MCHC RBC-ENTMCNC: 31.4 GM/DL — LOW (ref 32–36)
MCHC RBC-ENTMCNC: 31.5 GM/DL — LOW (ref 32–36)
MCHC RBC-ENTMCNC: 31.6 GM/DL — LOW (ref 32–36)
MCHC RBC-ENTMCNC: 31.7 GM/DL — LOW (ref 32–36)
MCHC RBC-ENTMCNC: 31.8 GM/DL — LOW (ref 32–36)
MCHC RBC-ENTMCNC: 31.9 GM/DL — LOW (ref 32–36)
MCHC RBC-ENTMCNC: 32 GM/DL — SIGNIFICANT CHANGE UP (ref 32–36)
MCHC RBC-ENTMCNC: 32 GM/DL — SIGNIFICANT CHANGE UP (ref 32–36)
MCHC RBC-ENTMCNC: 32.1 GM/DL — SIGNIFICANT CHANGE UP (ref 32–36)
MCHC RBC-ENTMCNC: 32.2 GM/DL — SIGNIFICANT CHANGE UP (ref 32–36)
MCHC RBC-ENTMCNC: 32.5 GM/DL — SIGNIFICANT CHANGE UP (ref 32–36)
MCHC RBC-ENTMCNC: 32.8 GM/DL — SIGNIFICANT CHANGE UP (ref 32–36)
MCV RBC AUTO: 90.1 FL — SIGNIFICANT CHANGE UP (ref 80–100)
MCV RBC AUTO: 90.2 FL — SIGNIFICANT CHANGE UP (ref 80–100)
MCV RBC AUTO: 90.6 FL — SIGNIFICANT CHANGE UP (ref 80–100)
MCV RBC AUTO: 91.2 FL — SIGNIFICANT CHANGE UP (ref 80–100)
MCV RBC AUTO: 91.6 FL — SIGNIFICANT CHANGE UP (ref 80–100)
MCV RBC AUTO: 91.6 FL — SIGNIFICANT CHANGE UP (ref 80–100)
MCV RBC AUTO: 91.8 FL — SIGNIFICANT CHANGE UP (ref 80–100)
MCV RBC AUTO: 91.9 FL — SIGNIFICANT CHANGE UP (ref 80–100)
MCV RBC AUTO: 92 FL — SIGNIFICANT CHANGE UP (ref 80–100)
MCV RBC AUTO: 92 FL — SIGNIFICANT CHANGE UP (ref 80–100)
MCV RBC AUTO: 92.7 FL — SIGNIFICANT CHANGE UP (ref 80–100)
MCV RBC AUTO: 92.9 FL — SIGNIFICANT CHANGE UP (ref 80–100)
MCV RBC AUTO: 93.2 FL — SIGNIFICANT CHANGE UP (ref 80–100)
MCV RBC AUTO: 93.2 FL — SIGNIFICANT CHANGE UP (ref 80–100)
MCV RBC AUTO: 93.4 FL — SIGNIFICANT CHANGE UP (ref 80–100)
MCV RBC AUTO: 93.5 FL — SIGNIFICANT CHANGE UP (ref 80–100)
MCV RBC AUTO: 93.9 FL — SIGNIFICANT CHANGE UP (ref 80–100)
MCV RBC AUTO: 94 FL — SIGNIFICANT CHANGE UP (ref 80–100)
MCV RBC AUTO: 94.1 FL — SIGNIFICANT CHANGE UP (ref 80–100)
MCV RBC AUTO: 94.1 FL — SIGNIFICANT CHANGE UP (ref 80–100)
MCV RBC AUTO: 94.2 FL — SIGNIFICANT CHANGE UP (ref 80–100)
MCV RBC AUTO: 94.5 FL — SIGNIFICANT CHANGE UP (ref 80–100)
MCV RBC AUTO: 94.6 FL — SIGNIFICANT CHANGE UP (ref 80–100)
MCV RBC AUTO: 94.7 FL — SIGNIFICANT CHANGE UP (ref 80–100)
MCV RBC AUTO: 95.2 FL — SIGNIFICANT CHANGE UP (ref 80–100)
MCV RBC AUTO: 95.3 FL — SIGNIFICANT CHANGE UP (ref 80–100)
MCV RBC AUTO: 96 FL — SIGNIFICANT CHANGE UP (ref 80–100)
MCV RBC AUTO: 96.5 FL
MELD SCORE WITH DIALYSIS: 24 POINTS — SIGNIFICANT CHANGE UP
MELD SCORE WITHOUT DIALYSIS: 19 POINTS — SIGNIFICANT CHANGE UP
METAMYELOCYTES # FLD: 0.9 % — HIGH (ref 0–0)
METAMYELOCYTES # FLD: 1.7 % — HIGH (ref 0–0)
METHOD TYPE: SIGNIFICANT CHANGE UP
MISCELLANEOUS TEST NAME: SIGNIFICANT CHANGE UP
MONOCYTES # BLD AUTO: 0.2 K/UL — SIGNIFICANT CHANGE UP (ref 0–0.9)
MONOCYTES # BLD AUTO: 0.31 K/UL — SIGNIFICANT CHANGE UP (ref 0–0.9)
MONOCYTES # BLD AUTO: 0.37 K/UL — SIGNIFICANT CHANGE UP (ref 0–0.9)
MONOCYTES # BLD AUTO: 0.37 K/UL — SIGNIFICANT CHANGE UP (ref 0–0.9)
MONOCYTES # BLD AUTO: 0.38 K/UL — SIGNIFICANT CHANGE UP (ref 0–0.9)
MONOCYTES # BLD AUTO: 0.38 K/UL — SIGNIFICANT CHANGE UP (ref 0–0.9)
MONOCYTES # BLD AUTO: 0.39 K/UL — SIGNIFICANT CHANGE UP (ref 0–0.9)
MONOCYTES # BLD AUTO: 0.39 K/UL — SIGNIFICANT CHANGE UP (ref 0–0.9)
MONOCYTES # BLD AUTO: 0.41 K/UL — SIGNIFICANT CHANGE UP (ref 0–0.9)
MONOCYTES # BLD AUTO: 0.43 K/UL
MONOCYTES # BLD AUTO: 0.44 K/UL — SIGNIFICANT CHANGE UP (ref 0–0.9)
MONOCYTES # BLD AUTO: 0.47 K/UL — SIGNIFICANT CHANGE UP (ref 0–0.9)
MONOCYTES # BLD AUTO: 0.51 K/UL — SIGNIFICANT CHANGE UP (ref 0–0.9)
MONOCYTES # BLD AUTO: 0.53 K/UL — SIGNIFICANT CHANGE UP (ref 0–0.9)
MONOCYTES # BLD AUTO: 0.55 K/UL — SIGNIFICANT CHANGE UP (ref 0–0.9)
MONOCYTES # BLD AUTO: 0.56 K/UL — SIGNIFICANT CHANGE UP (ref 0–0.9)
MONOCYTES # BLD AUTO: 0.63 K/UL — SIGNIFICANT CHANGE UP (ref 0–0.9)
MONOCYTES # BLD AUTO: 0.72 K/UL — SIGNIFICANT CHANGE UP (ref 0–0.9)
MONOCYTES # BLD AUTO: 0.78 K/UL — SIGNIFICANT CHANGE UP (ref 0–0.9)
MONOCYTES # BLD AUTO: 0.92 K/UL — HIGH (ref 0–0.9)
MONOCYTES NFR BLD AUTO: 10.5 % — SIGNIFICANT CHANGE UP (ref 2–14)
MONOCYTES NFR BLD AUTO: 10.5 % — SIGNIFICANT CHANGE UP (ref 2–14)
MONOCYTES NFR BLD AUTO: 2.7 % — SIGNIFICANT CHANGE UP (ref 2–14)
MONOCYTES NFR BLD AUTO: 4.1 % — SIGNIFICANT CHANGE UP (ref 2–14)
MONOCYTES NFR BLD AUTO: 4.4 %
MONOCYTES NFR BLD AUTO: 4.5 % — SIGNIFICANT CHANGE UP (ref 2–14)
MONOCYTES NFR BLD AUTO: 5 % — SIGNIFICANT CHANGE UP (ref 2–14)
MONOCYTES NFR BLD AUTO: 5.1 % — SIGNIFICANT CHANGE UP (ref 2–14)
MONOCYTES NFR BLD AUTO: 5.1 % — SIGNIFICANT CHANGE UP (ref 2–14)
MONOCYTES NFR BLD AUTO: 5.2 % — SIGNIFICANT CHANGE UP (ref 2–14)
MONOCYTES NFR BLD AUTO: 5.4 % — SIGNIFICANT CHANGE UP (ref 2–14)
MONOCYTES NFR BLD AUTO: 5.5 % — SIGNIFICANT CHANGE UP (ref 2–14)
MONOCYTES NFR BLD AUTO: 5.7 % — SIGNIFICANT CHANGE UP (ref 2–14)
MONOCYTES NFR BLD AUTO: 5.9 % — SIGNIFICANT CHANGE UP (ref 2–14)
MONOCYTES NFR BLD AUTO: 5.9 % — SIGNIFICANT CHANGE UP (ref 2–14)
MONOCYTES NFR BLD AUTO: 6 % — SIGNIFICANT CHANGE UP (ref 2–14)
MONOCYTES NFR BLD AUTO: 6.8 % — SIGNIFICANT CHANGE UP (ref 2–14)
MONOCYTES NFR BLD AUTO: 7 % — SIGNIFICANT CHANGE UP (ref 2–14)
MONOCYTES NFR BLD AUTO: 8.7 % — SIGNIFICANT CHANGE UP (ref 2–14)
MONOCYTES NFR BLD AUTO: 9.1 % — SIGNIFICANT CHANGE UP (ref 2–14)
MONOS+MACROS # FLD: 6 % — SIGNIFICANT CHANGE UP
MRSA PCR RESULT.: SIGNIFICANT CHANGE UP
MYELOCYTES NFR BLD: 11.9 % — HIGH (ref 0–0)
MYELOCYTES NFR BLD: 2.6 % — HIGH (ref 0–0)
NEUTROPHILS # BLD AUTO: 4.07 K/UL — SIGNIFICANT CHANGE UP (ref 1.8–7.4)
NEUTROPHILS # BLD AUTO: 4.21 K/UL — SIGNIFICANT CHANGE UP (ref 1.8–7.4)
NEUTROPHILS # BLD AUTO: 4.3 K/UL — SIGNIFICANT CHANGE UP (ref 1.8–7.4)
NEUTROPHILS # BLD AUTO: 4.35 K/UL — SIGNIFICANT CHANGE UP (ref 1.8–7.4)
NEUTROPHILS # BLD AUTO: 4.64 K/UL — SIGNIFICANT CHANGE UP (ref 1.8–7.4)
NEUTROPHILS # BLD AUTO: 4.73 K/UL — SIGNIFICANT CHANGE UP (ref 1.8–7.4)
NEUTROPHILS # BLD AUTO: 4.75 K/UL — SIGNIFICANT CHANGE UP (ref 1.8–7.4)
NEUTROPHILS # BLD AUTO: 4.8 K/UL — SIGNIFICANT CHANGE UP (ref 1.8–7.4)
NEUTROPHILS # BLD AUTO: 5.01 K/UL — SIGNIFICANT CHANGE UP (ref 1.8–7.4)
NEUTROPHILS # BLD AUTO: 5.1 K/UL — SIGNIFICANT CHANGE UP (ref 1.8–7.4)
NEUTROPHILS # BLD AUTO: 5.78 K/UL — SIGNIFICANT CHANGE UP (ref 1.8–7.4)
NEUTROPHILS # BLD AUTO: 5.81 K/UL — SIGNIFICANT CHANGE UP (ref 1.8–7.4)
NEUTROPHILS # BLD AUTO: 5.81 K/UL — SIGNIFICANT CHANGE UP (ref 1.8–7.4)
NEUTROPHILS # BLD AUTO: 5.88 K/UL — SIGNIFICANT CHANGE UP (ref 1.8–7.4)
NEUTROPHILS # BLD AUTO: 6.01 K/UL — SIGNIFICANT CHANGE UP (ref 1.8–7.4)
NEUTROPHILS # BLD AUTO: 6.25 K/UL — SIGNIFICANT CHANGE UP (ref 1.8–7.4)
NEUTROPHILS # BLD AUTO: 6.45 K/UL — SIGNIFICANT CHANGE UP (ref 1.8–7.4)
NEUTROPHILS # BLD AUTO: 6.93 K/UL — SIGNIFICANT CHANGE UP (ref 1.8–7.4)
NEUTROPHILS # BLD AUTO: 7.18 K/UL
NEUTROPHILS # BLD AUTO: 7.84 K/UL — HIGH (ref 1.8–7.4)
NEUTROPHILS NFR BLD AUTO: 55.1 % — SIGNIFICANT CHANGE UP (ref 43–77)
NEUTROPHILS NFR BLD AUTO: 61.3 % — SIGNIFICANT CHANGE UP (ref 43–77)
NEUTROPHILS NFR BLD AUTO: 61.4 % — SIGNIFICANT CHANGE UP (ref 43–77)
NEUTROPHILS NFR BLD AUTO: 62.2 % — SIGNIFICANT CHANGE UP (ref 43–77)
NEUTROPHILS NFR BLD AUTO: 62.9 % — SIGNIFICANT CHANGE UP (ref 43–77)
NEUTROPHILS NFR BLD AUTO: 63.8 % — SIGNIFICANT CHANGE UP (ref 43–77)
NEUTROPHILS NFR BLD AUTO: 64.1 % — SIGNIFICANT CHANGE UP (ref 43–77)
NEUTROPHILS NFR BLD AUTO: 64.8 % — SIGNIFICANT CHANGE UP (ref 43–77)
NEUTROPHILS NFR BLD AUTO: 64.9 % — SIGNIFICANT CHANGE UP (ref 43–77)
NEUTROPHILS NFR BLD AUTO: 65.1 % — SIGNIFICANT CHANGE UP (ref 43–77)
NEUTROPHILS NFR BLD AUTO: 66.3 % — SIGNIFICANT CHANGE UP (ref 43–77)
NEUTROPHILS NFR BLD AUTO: 68.4 % — SIGNIFICANT CHANGE UP (ref 43–77)
NEUTROPHILS NFR BLD AUTO: 70.2 % — SIGNIFICANT CHANGE UP (ref 43–77)
NEUTROPHILS NFR BLD AUTO: 72 % — SIGNIFICANT CHANGE UP (ref 43–77)
NEUTROPHILS NFR BLD AUTO: 73.9 % — SIGNIFICANT CHANGE UP (ref 43–77)
NEUTROPHILS NFR BLD AUTO: 74.3 %
NEUTROPHILS NFR BLD AUTO: 76.4 % — SIGNIFICANT CHANGE UP (ref 43–77)
NEUTROPHILS NFR BLD AUTO: 81.2 % — HIGH (ref 43–77)
NEUTROPHILS NFR BLD AUTO: 81.9 % — HIGH (ref 43–77)
NEUTROPHILS NFR BLD AUTO: 82.2 % — HIGH (ref 43–77)
NEUTROPHILS-BODY FLUID: 90 % — SIGNIFICANT CHANGE UP
NEUTS BAND # BLD: 4.2 % — SIGNIFICANT CHANGE UP (ref 0–8)
NITRITE UR-MCNC: NEGATIVE — SIGNIFICANT CHANGE UP
NON HDL CHOLESTEROL: 130 MG/DL — HIGH
NONHDLC SERPL-MCNC: 159 MG/DL
NRBC # BLD: 0 /100 WBCS — SIGNIFICANT CHANGE UP (ref 0–0)
NRBC # BLD: 2 /100 — HIGH (ref 0–0)
ORGANISM # SPEC MICROSCOPIC CNT: SIGNIFICANT CHANGE UP
OSMOLALITY UR: 346 MOS/KG — SIGNIFICANT CHANGE UP (ref 300–900)
OSMOLALITY UR: 632 MOS/KG — SIGNIFICANT CHANGE UP (ref 300–900)
PCO2 BLDV: 28 MMHG — LOW (ref 42–55)
PCO2 BLDV: 36 MMHG — LOW (ref 42–55)
PCO2 BLDV: 36 MMHG — LOW (ref 42–55)
PCO2 BLDV: 37 MMHG — LOW (ref 42–55)
PCO2 BLDV: 41 MMHG — LOW (ref 42–55)
PCO2 BLDV: 42 MMHG — SIGNIFICANT CHANGE UP (ref 42–55)
PH BLDV: 7.24 — LOW (ref 7.32–7.43)
PH BLDV: 7.25 — LOW (ref 7.32–7.43)
PH BLDV: 7.32 — SIGNIFICANT CHANGE UP (ref 7.32–7.43)
PH BLDV: 7.33 — SIGNIFICANT CHANGE UP (ref 7.32–7.43)
PH BLDV: 7.33 — SIGNIFICANT CHANGE UP (ref 7.32–7.43)
PH BLDV: 7.35 — SIGNIFICANT CHANGE UP (ref 7.32–7.43)
PH UR: 5 — SIGNIFICANT CHANGE UP (ref 5–8)
PH UR: 5.5 — SIGNIFICANT CHANGE UP (ref 5–8)
PH UR: 6 — SIGNIFICANT CHANGE UP (ref 5–8)
PHOSPHATE SERPL-MCNC: 2.3 MG/DL — LOW (ref 2.5–4.5)
PHOSPHATE SERPL-MCNC: 2.9 MG/DL — SIGNIFICANT CHANGE UP (ref 2.5–4.5)
PHOSPHATE SERPL-MCNC: 3 MG/DL — SIGNIFICANT CHANGE UP (ref 2.5–4.5)
PHOSPHATE SERPL-MCNC: 3 MG/DL — SIGNIFICANT CHANGE UP (ref 2.5–4.5)
PHOSPHATE SERPL-MCNC: 3.1 MG/DL — SIGNIFICANT CHANGE UP (ref 2.5–4.5)
PHOSPHATE SERPL-MCNC: 3.2 MG/DL — SIGNIFICANT CHANGE UP (ref 2.5–4.5)
PHOSPHATE SERPL-MCNC: 3.3 MG/DL — SIGNIFICANT CHANGE UP (ref 2.5–4.5)
PHOSPHATE SERPL-MCNC: 3.3 MG/DL — SIGNIFICANT CHANGE UP (ref 2.5–4.5)
PHOSPHATE SERPL-MCNC: 3.4 MG/DL — SIGNIFICANT CHANGE UP (ref 2.5–4.5)
PHOSPHATE SERPL-MCNC: 3.5 MG/DL — SIGNIFICANT CHANGE UP (ref 2.5–4.5)
PHOSPHATE SERPL-MCNC: 3.6 MG/DL — SIGNIFICANT CHANGE UP (ref 2.5–4.5)
PHOSPHATE SERPL-MCNC: 3.6 MG/DL — SIGNIFICANT CHANGE UP (ref 2.5–4.5)
PHOSPHATE SERPL-MCNC: 3.8 MG/DL — SIGNIFICANT CHANGE UP (ref 2.5–4.5)
PHOSPHATE SERPL-MCNC: 3.9 MG/DL — SIGNIFICANT CHANGE UP (ref 2.5–4.5)
PHOSPHATE SERPL-MCNC: 3.9 MG/DL — SIGNIFICANT CHANGE UP (ref 2.5–4.5)
PHOSPHATE SERPL-MCNC: 4.3 MG/DL — SIGNIFICANT CHANGE UP (ref 2.5–4.5)
PHOSPHATE SERPL-MCNC: 4.5 MG/DL — SIGNIFICANT CHANGE UP (ref 2.5–4.5)
PHOSPHATE SERPL-MCNC: 7.3 MG/DL — HIGH (ref 2.4–4.7)
PLAT MORPH BLD: NORMAL — SIGNIFICANT CHANGE UP
PLATELET # BLD AUTO: 178 K/UL
PLATELET # BLD AUTO: 186 K/UL — SIGNIFICANT CHANGE UP (ref 150–400)
PLATELET # BLD AUTO: 194 K/UL — SIGNIFICANT CHANGE UP (ref 150–400)
PLATELET # BLD AUTO: 216 K/UL — SIGNIFICANT CHANGE UP (ref 150–400)
PLATELET # BLD AUTO: 220 K/UL — SIGNIFICANT CHANGE UP (ref 150–400)
PLATELET # BLD AUTO: 228 K/UL — SIGNIFICANT CHANGE UP (ref 150–400)
PLATELET # BLD AUTO: 231 K/UL — SIGNIFICANT CHANGE UP (ref 150–400)
PLATELET # BLD AUTO: 237 K/UL — SIGNIFICANT CHANGE UP (ref 150–400)
PLATELET # BLD AUTO: 254 K/UL — SIGNIFICANT CHANGE UP (ref 150–400)
PLATELET # BLD AUTO: 259 K/UL — SIGNIFICANT CHANGE UP (ref 150–400)
PLATELET # BLD AUTO: 267 K/UL — SIGNIFICANT CHANGE UP (ref 150–400)
PLATELET # BLD AUTO: 275 K/UL — SIGNIFICANT CHANGE UP (ref 150–400)
PLATELET # BLD AUTO: 282 K/UL — SIGNIFICANT CHANGE UP (ref 150–400)
PLATELET # BLD AUTO: 285 K/UL — SIGNIFICANT CHANGE UP (ref 150–400)
PLATELET # BLD AUTO: 291 K/UL — SIGNIFICANT CHANGE UP (ref 150–400)
PLATELET # BLD AUTO: 293 K/UL — SIGNIFICANT CHANGE UP (ref 150–400)
PLATELET # BLD AUTO: 294 K/UL — SIGNIFICANT CHANGE UP (ref 150–400)
PLATELET # BLD AUTO: 298 K/UL — SIGNIFICANT CHANGE UP (ref 150–400)
PLATELET # BLD AUTO: 300 K/UL — SIGNIFICANT CHANGE UP (ref 150–400)
PLATELET # BLD AUTO: 308 K/UL — SIGNIFICANT CHANGE UP (ref 150–400)
PLATELET # BLD AUTO: 313 K/UL — SIGNIFICANT CHANGE UP (ref 150–400)
PLATELET # BLD AUTO: 320 K/UL — SIGNIFICANT CHANGE UP (ref 150–400)
PLATELET # BLD AUTO: 363 K/UL — SIGNIFICANT CHANGE UP (ref 150–400)
PLATELET # BLD AUTO: 393 K/UL — SIGNIFICANT CHANGE UP (ref 150–400)
PLATELET # BLD AUTO: 412 K/UL — HIGH (ref 150–400)
PLATELET # BLD AUTO: 467 K/UL — HIGH (ref 150–400)
PLATELET # BLD AUTO: 478 K/UL — HIGH (ref 150–400)
PLATELET # BLD AUTO: 481 K/UL — HIGH (ref 150–400)
PO2 BLDV: 19 MMHG — LOW (ref 25–45)
PO2 BLDV: 33 MMHG — SIGNIFICANT CHANGE UP (ref 25–45)
PO2 BLDV: 36 MMHG — SIGNIFICANT CHANGE UP (ref 25–45)
PO2 BLDV: 41 MMHG — SIGNIFICANT CHANGE UP (ref 25–45)
PO2 BLDV: 69 MMHG — HIGH (ref 25–45)
PO2 BLDV: 86 MMHG — HIGH (ref 25–45)
POTASSIUM BLDV-SCNC: 3.8 MMOL/L — SIGNIFICANT CHANGE UP (ref 3.5–5.1)
POTASSIUM BLDV-SCNC: 4 MMOL/L — SIGNIFICANT CHANGE UP (ref 3.5–5.1)
POTASSIUM BLDV-SCNC: 4.4 MMOL/L — SIGNIFICANT CHANGE UP (ref 3.5–5.1)
POTASSIUM BLDV-SCNC: 4.8 MMOL/L — SIGNIFICANT CHANGE UP (ref 3.5–5.1)
POTASSIUM BLDV-SCNC: 4.8 MMOL/L — SIGNIFICANT CHANGE UP (ref 3.5–5.1)
POTASSIUM BLDV-SCNC: 5.1 MMOL/L — SIGNIFICANT CHANGE UP (ref 3.5–5.1)
POTASSIUM SERPL-MCNC: 3.6 MMOL/L — SIGNIFICANT CHANGE UP (ref 3.5–5.3)
POTASSIUM SERPL-MCNC: 3.6 MMOL/L — SIGNIFICANT CHANGE UP (ref 3.5–5.3)
POTASSIUM SERPL-MCNC: 3.8 MMOL/L — SIGNIFICANT CHANGE UP (ref 3.5–5.3)
POTASSIUM SERPL-MCNC: 3.9 MMOL/L — SIGNIFICANT CHANGE UP (ref 3.5–5.3)
POTASSIUM SERPL-MCNC: 3.9 MMOL/L — SIGNIFICANT CHANGE UP (ref 3.5–5.3)
POTASSIUM SERPL-MCNC: 4 MMOL/L — SIGNIFICANT CHANGE UP (ref 3.5–5.3)
POTASSIUM SERPL-MCNC: 4.1 MMOL/L — SIGNIFICANT CHANGE UP (ref 3.5–5.3)
POTASSIUM SERPL-MCNC: 4.2 MMOL/L — SIGNIFICANT CHANGE UP (ref 3.5–5.3)
POTASSIUM SERPL-MCNC: 4.2 MMOL/L — SIGNIFICANT CHANGE UP (ref 3.5–5.3)
POTASSIUM SERPL-MCNC: 4.3 MMOL/L — SIGNIFICANT CHANGE UP (ref 3.5–5.3)
POTASSIUM SERPL-MCNC: 4.3 MMOL/L — SIGNIFICANT CHANGE UP (ref 3.5–5.3)
POTASSIUM SERPL-MCNC: 4.4 MMOL/L — SIGNIFICANT CHANGE UP (ref 3.5–5.3)
POTASSIUM SERPL-MCNC: 4.5 MMOL/L — SIGNIFICANT CHANGE UP (ref 3.5–5.3)
POTASSIUM SERPL-MCNC: 4.6 MMOL/L — SIGNIFICANT CHANGE UP (ref 3.5–5.3)
POTASSIUM SERPL-MCNC: 4.7 MMOL/L — SIGNIFICANT CHANGE UP (ref 3.5–5.3)
POTASSIUM SERPL-MCNC: 4.8 MMOL/L — SIGNIFICANT CHANGE UP (ref 3.5–5.3)
POTASSIUM SERPL-MCNC: 4.8 MMOL/L — SIGNIFICANT CHANGE UP (ref 3.5–5.3)
POTASSIUM SERPL-MCNC: 4.9 MMOL/L — SIGNIFICANT CHANGE UP (ref 3.5–5.3)
POTASSIUM SERPL-MCNC: 5.1 MMOL/L — SIGNIFICANT CHANGE UP (ref 3.5–5.3)
POTASSIUM SERPL-MCNC: 5.1 MMOL/L — SIGNIFICANT CHANGE UP (ref 3.5–5.3)
POTASSIUM SERPL-MCNC: 5.2 MMOL/L — SIGNIFICANT CHANGE UP (ref 3.5–5.3)
POTASSIUM SERPL-SCNC: 3.6 MMOL/L — SIGNIFICANT CHANGE UP (ref 3.5–5.3)
POTASSIUM SERPL-SCNC: 3.6 MMOL/L — SIGNIFICANT CHANGE UP (ref 3.5–5.3)
POTASSIUM SERPL-SCNC: 3.8 MMOL/L — SIGNIFICANT CHANGE UP (ref 3.5–5.3)
POTASSIUM SERPL-SCNC: 3.9 MMOL/L — SIGNIFICANT CHANGE UP (ref 3.5–5.3)
POTASSIUM SERPL-SCNC: 3.9 MMOL/L — SIGNIFICANT CHANGE UP (ref 3.5–5.3)
POTASSIUM SERPL-SCNC: 4 MMOL/L — SIGNIFICANT CHANGE UP (ref 3.5–5.3)
POTASSIUM SERPL-SCNC: 4.1 MMOL/L — SIGNIFICANT CHANGE UP (ref 3.5–5.3)
POTASSIUM SERPL-SCNC: 4.2 MMOL/L — SIGNIFICANT CHANGE UP (ref 3.5–5.3)
POTASSIUM SERPL-SCNC: 4.2 MMOL/L — SIGNIFICANT CHANGE UP (ref 3.5–5.3)
POTASSIUM SERPL-SCNC: 4.3 MMOL/L
POTASSIUM SERPL-SCNC: 4.3 MMOL/L — SIGNIFICANT CHANGE UP (ref 3.5–5.3)
POTASSIUM SERPL-SCNC: 4.3 MMOL/L — SIGNIFICANT CHANGE UP (ref 3.5–5.3)
POTASSIUM SERPL-SCNC: 4.4 MMOL/L — SIGNIFICANT CHANGE UP (ref 3.5–5.3)
POTASSIUM SERPL-SCNC: 4.5 MMOL/L — SIGNIFICANT CHANGE UP (ref 3.5–5.3)
POTASSIUM SERPL-SCNC: 4.6 MMOL/L — SIGNIFICANT CHANGE UP (ref 3.5–5.3)
POTASSIUM SERPL-SCNC: 4.7 MMOL/L — SIGNIFICANT CHANGE UP (ref 3.5–5.3)
POTASSIUM SERPL-SCNC: 4.8 MMOL/L — SIGNIFICANT CHANGE UP (ref 3.5–5.3)
POTASSIUM SERPL-SCNC: 4.8 MMOL/L — SIGNIFICANT CHANGE UP (ref 3.5–5.3)
POTASSIUM SERPL-SCNC: 4.9 MMOL/L — SIGNIFICANT CHANGE UP (ref 3.5–5.3)
POTASSIUM SERPL-SCNC: 5.1 MMOL/L — SIGNIFICANT CHANGE UP (ref 3.5–5.3)
POTASSIUM SERPL-SCNC: 5.1 MMOL/L — SIGNIFICANT CHANGE UP (ref 3.5–5.3)
POTASSIUM SERPL-SCNC: 5.2 MMOL/L — SIGNIFICANT CHANGE UP (ref 3.5–5.3)
POTASSIUM UR-SCNC: 18 MMOL/L — SIGNIFICANT CHANGE UP
POTASSIUM UR-SCNC: 41 MMOL/L — SIGNIFICANT CHANGE UP
PROCALCITONIN SERPL-MCNC: 7.01 NG/ML — HIGH (ref 0.02–0.1)
PROT ?TM UR-MCNC: 99 MG/DL — HIGH (ref 0–12)
PROT SERPL-MCNC: 5.9 G/DL — LOW (ref 6–8.3)
PROT SERPL-MCNC: 6 G/DL — LOW (ref 6.6–8.7)
PROT SERPL-MCNC: 6.1 G/DL — LOW (ref 6.6–8.7)
PROT SERPL-MCNC: 6.1 G/DL — LOW (ref 6.6–8.7)
PROT SERPL-MCNC: 6.1 G/DL — SIGNIFICANT CHANGE UP (ref 6–8.3)
PROT SERPL-MCNC: 6.2 G/DL — SIGNIFICANT CHANGE UP (ref 6–8.3)
PROT SERPL-MCNC: 6.4 G/DL — SIGNIFICANT CHANGE UP (ref 6–8.3)
PROT SERPL-MCNC: 6.5 G/DL — SIGNIFICANT CHANGE UP (ref 6–8.3)
PROT SERPL-MCNC: 6.7 G/DL — SIGNIFICANT CHANGE UP (ref 6–8.3)
PROT SERPL-MCNC: 6.9 G/DL — SIGNIFICANT CHANGE UP (ref 6.6–8.7)
PROT SERPL-MCNC: 6.9 G/DL — SIGNIFICANT CHANGE UP (ref 6–8.3)
PROT SERPL-MCNC: 7 G/DL — SIGNIFICANT CHANGE UP (ref 6–8.3)
PROT SERPL-MCNC: 7.1 G/DL
PROT SERPL-MCNC: 7.1 G/DL — SIGNIFICANT CHANGE UP (ref 6–8.3)
PROT SERPL-MCNC: 7.2 G/DL — SIGNIFICANT CHANGE UP (ref 6–8.3)
PROT SERPL-MCNC: 7.2 G/DL — SIGNIFICANT CHANGE UP (ref 6–8.3)
PROT SERPL-MCNC: 7.3 G/DL — SIGNIFICANT CHANGE UP (ref 6–8.3)
PROT SERPL-MCNC: 7.3 G/DL — SIGNIFICANT CHANGE UP (ref 6–8.3)
PROT SERPL-MCNC: 7.4 G/DL — SIGNIFICANT CHANGE UP (ref 6–8.3)
PROT SERPL-MCNC: 7.6 G/DL — SIGNIFICANT CHANGE UP (ref 6–8.3)
PROT SERPL-MCNC: 7.7 G/DL — SIGNIFICANT CHANGE UP (ref 6–8.3)
PROT SERPL-MCNC: 8.1 G/DL — SIGNIFICANT CHANGE UP (ref 6–8.3)
PROT UR-MCNC: 100
PROT UR-MCNC: ABNORMAL
PROT/CREAT UR-RTO: 0.8 RATIO — HIGH (ref 0–0.2)
PROTHROM AB SERPL-ACNC: 13.2 SEC — SIGNIFICANT CHANGE UP (ref 10.5–13.4)
PROTHROM AB SERPL-ACNC: 13.9 SEC — HIGH (ref 10.5–13.4)
PROTHROM AB SERPL-ACNC: 14 SEC — HIGH (ref 10.5–13.4)
PROTHROM AB SERPL-ACNC: 16.3 SEC — HIGH (ref 10.5–13.4)
PT BLD: 13.5 SEC
RAPID RVP RESULT: SIGNIFICANT CHANGE UP
RAPID RVP RESULT: SIGNIFICANT CHANGE UP
RBC # BLD: 2.75 M/UL — LOW (ref 4.2–5.8)
RBC # BLD: 2.81 M/UL — LOW (ref 4.2–5.8)
RBC # BLD: 2.87 M/UL — LOW (ref 4.2–5.8)
RBC # BLD: 2.94 M/UL — LOW (ref 4.2–5.8)
RBC # BLD: 2.94 M/UL — LOW (ref 4.2–5.8)
RBC # BLD: 2.97 M/UL — LOW (ref 4.2–5.8)
RBC # BLD: 3.04 M/UL — LOW (ref 4.2–5.8)
RBC # BLD: 3.08 M/UL — LOW (ref 4.2–5.8)
RBC # BLD: 3.11 M/UL — LOW (ref 4.2–5.8)
RBC # BLD: 3.21 M/UL — LOW (ref 4.2–5.8)
RBC # BLD: 3.22 M/UL — LOW (ref 4.2–5.8)
RBC # BLD: 3.25 M/UL — LOW (ref 4.2–5.8)
RBC # BLD: 3.29 M/UL — LOW (ref 4.2–5.8)
RBC # BLD: 3.35 M/UL — LOW (ref 4.2–5.8)
RBC # BLD: 3.36 M/UL — LOW (ref 4.2–5.8)
RBC # BLD: 3.36 M/UL — LOW (ref 4.2–5.8)
RBC # BLD: 3.37 M/UL — LOW (ref 4.2–5.8)
RBC # BLD: 3.4 M/UL — LOW (ref 4.2–5.8)
RBC # BLD: 3.42 M/UL — LOW (ref 4.2–5.8)
RBC # BLD: 3.45 M/UL — LOW (ref 4.2–5.8)
RBC # BLD: 3.5 M/UL — LOW (ref 4.2–5.8)
RBC # BLD: 3.52 M/UL — LOW (ref 4.2–5.8)
RBC # BLD: 3.55 M/UL — LOW (ref 4.2–5.8)
RBC # BLD: 3.55 M/UL — LOW (ref 4.2–5.8)
RBC # BLD: 3.56 M/UL — LOW (ref 4.2–5.8)
RBC # BLD: 3.6 M/UL — LOW (ref 4.2–5.8)
RBC # BLD: 3.7 M/UL
RBC # BLD: 3.82 M/UL — LOW (ref 4.2–5.8)
RBC # FLD: 12.8 % — SIGNIFICANT CHANGE UP (ref 10.3–14.5)
RBC # FLD: 12.9 % — SIGNIFICANT CHANGE UP (ref 10.3–14.5)
RBC # FLD: 12.9 % — SIGNIFICANT CHANGE UP (ref 10.3–14.5)
RBC # FLD: 13 % — SIGNIFICANT CHANGE UP (ref 10.3–14.5)
RBC # FLD: 13.1 % — SIGNIFICANT CHANGE UP (ref 10.3–14.5)
RBC # FLD: 13.2 % — SIGNIFICANT CHANGE UP (ref 10.3–14.5)
RBC # FLD: 13.3 % — SIGNIFICANT CHANGE UP (ref 10.3–14.5)
RBC # FLD: 13.3 % — SIGNIFICANT CHANGE UP (ref 10.3–14.5)
RBC # FLD: 13.4 % — SIGNIFICANT CHANGE UP (ref 10.3–14.5)
RBC # FLD: 13.5 % — SIGNIFICANT CHANGE UP (ref 10.3–14.5)
RBC # FLD: 13.7 % — SIGNIFICANT CHANGE UP (ref 10.3–14.5)
RBC # FLD: 13.9 % — SIGNIFICANT CHANGE UP (ref 10.3–14.5)
RBC # FLD: 13.9 % — SIGNIFICANT CHANGE UP (ref 10.3–14.5)
RBC # FLD: 14.1 % — SIGNIFICANT CHANGE UP (ref 10.3–14.5)
RBC # FLD: 14.2 % — SIGNIFICANT CHANGE UP (ref 10.3–14.5)
RBC # FLD: 14.2 % — SIGNIFICANT CHANGE UP (ref 10.3–14.5)
RBC # FLD: 14.3 %
RBC # FLD: 14.4 % — SIGNIFICANT CHANGE UP (ref 10.3–14.5)
RBC # FLD: 14.4 % — SIGNIFICANT CHANGE UP (ref 10.3–14.5)
RBC # FLD: 14.9 % — HIGH (ref 10.3–14.5)
RBC # FLD: 16.7 % — HIGH (ref 10.3–14.5)
RBC # FLD: 16.9 % — HIGH (ref 10.3–14.5)
RBC # FLD: 17.4 % — HIGH (ref 10.3–14.5)
RBC BLD AUTO: ABNORMAL
RBC BLD AUTO: SIGNIFICANT CHANGE UP
RBC BLD AUTO: SIGNIFICANT CHANGE UP
RBC CASTS # UR COMP ASSIST: 148 /HPF — HIGH (ref 0–4)
RBC CASTS # UR COMP ASSIST: 24 /HPF — HIGH (ref 0–4)
RBC CASTS # UR COMP ASSIST: 67 /HPF — HIGH (ref 0–4)
RBC CASTS # UR COMP ASSIST: >50 /HPF — HIGH (ref 0–4)
RBC CASTS # UR COMP ASSIST: ABNORMAL /HPF (ref 0–4)
RCV VOL RI: 540 /UL — HIGH (ref 0–0)
RH IG SCN BLD-IMP: POSITIVE — SIGNIFICANT CHANGE UP
RSV RNA NPH QL NAA+NON-PROBE: SIGNIFICANT CHANGE UP
S AUREUS DNA NOSE QL NAA+PROBE: SIGNIFICANT CHANGE UP
SAO2 % BLDV: 26.4 % — LOW (ref 67–88)
SAO2 % BLDV: 48.5 % — LOW (ref 67–88)
SAO2 % BLDV: 58.7 % — LOW (ref 67–88)
SAO2 % BLDV: 63.7 % — LOW (ref 67–88)
SAO2 % BLDV: 92.9 % — HIGH (ref 67–88)
SAO2 % BLDV: 95.9 % — SIGNIFICANT CHANGE UP
SARS-COV-2 RNA SPEC QL NAA+PROBE: SIGNIFICANT CHANGE UP
SODIUM SERPL-SCNC: 129 MMOL/L — LOW (ref 135–145)
SODIUM SERPL-SCNC: 130 MMOL/L — LOW (ref 135–145)
SODIUM SERPL-SCNC: 131 MMOL/L — LOW (ref 135–145)
SODIUM SERPL-SCNC: 133 MMOL/L — LOW (ref 135–145)
SODIUM SERPL-SCNC: 134 MMOL/L — LOW (ref 135–145)
SODIUM SERPL-SCNC: 135 MMOL/L
SODIUM SERPL-SCNC: 135 MMOL/L — SIGNIFICANT CHANGE UP (ref 135–145)
SODIUM SERPL-SCNC: 135 MMOL/L — SIGNIFICANT CHANGE UP (ref 135–145)
SODIUM SERPL-SCNC: 136 MMOL/L — SIGNIFICANT CHANGE UP (ref 135–145)
SODIUM SERPL-SCNC: 137 MMOL/L — SIGNIFICANT CHANGE UP (ref 135–145)
SODIUM SERPL-SCNC: 138 MMOL/L — SIGNIFICANT CHANGE UP (ref 135–145)
SODIUM SERPL-SCNC: 138 MMOL/L — SIGNIFICANT CHANGE UP (ref 135–145)
SODIUM SERPL-SCNC: 139 MMOL/L — SIGNIFICANT CHANGE UP (ref 135–145)
SODIUM SERPL-SCNC: 141 MMOL/L — SIGNIFICANT CHANGE UP (ref 135–145)
SODIUM UR-SCNC: 40 MMOL/L — SIGNIFICANT CHANGE UP
SODIUM UR-SCNC: 42 MMOL/L — SIGNIFICANT CHANGE UP
SODIUM UR-SCNC: 78 MMOL/L — SIGNIFICANT CHANGE UP
SP GR SPEC: 1.02 — SIGNIFICANT CHANGE UP (ref 1.01–1.02)
SP GR SPEC: 1.05 — HIGH (ref 1.01–1.02)
SPECIMEN SOURCE: SIGNIFICANT CHANGE UP
TIBC SERPL-MCNC: 138 UG/DL — LOW (ref 220–430)
TOTAL NUCLEATED CELL COUNT, BODY FLUID: 3750 /UL — SIGNIFICANT CHANGE UP
TRIGL SERPL-MCNC: 140 MG/DL — SIGNIFICANT CHANGE UP
TRIGL SERPL-MCNC: 167 MG/DL
TROPONIN T, HIGH SENSITIVITY RESULT: 26 NG/L — SIGNIFICANT CHANGE UP (ref 0–51)
TUBE TYPE: SIGNIFICANT CHANGE UP
UIBC SERPL-MCNC: 74 UG/DL — LOW (ref 110–370)
UROBILINOGEN FLD QL: NEGATIVE MG/DL — SIGNIFICANT CHANGE UP
UROBILINOGEN FLD QL: NEGATIVE — SIGNIFICANT CHANGE UP
UUN UR-MCNC: 727 MG/DL — SIGNIFICANT CHANGE UP
VANCOMYCIN FLD-MCNC: 13.3 UG/ML — SIGNIFICANT CHANGE UP
VANCOMYCIN FLD-MCNC: 14 UG/ML — SIGNIFICANT CHANGE UP
VANCOMYCIN FLD-MCNC: 16.4 UG/ML — SIGNIFICANT CHANGE UP
VANCOMYCIN FLD-MCNC: 21.7 UG/ML — SIGNIFICANT CHANGE UP
VANCOMYCIN FLD-MCNC: 23 UG/ML — SIGNIFICANT CHANGE UP
VANCOMYCIN FLD-MCNC: 7.3 UG/ML — SIGNIFICANT CHANGE UP
VANCOMYCIN TROUGH SERPL-MCNC: 15.6 UG/ML — SIGNIFICANT CHANGE UP (ref 10–20)
VANCOMYCIN TROUGH SERPL-MCNC: 21.8 UG/ML — HIGH (ref 10–20)
VANCOMYCIN TROUGH SERPL-MCNC: 26 UG/ML — CRITICAL HIGH (ref 10–20)
VANCOMYCIN TROUGH SERPL-MCNC: 8.8 UG/ML — LOW (ref 10–20)
VARIANT LYMPHS # BLD: 0.9 % — SIGNIFICANT CHANGE UP (ref 0–6)
VIT B12 SERPL-MCNC: 349 PG/ML — SIGNIFICANT CHANGE UP (ref 232–1245)
WBC # BLD: 10.88 K/UL — HIGH (ref 3.8–10.5)
WBC # BLD: 6.26 K/UL — SIGNIFICANT CHANGE UP (ref 3.8–10.5)
WBC # BLD: 6.86 K/UL — SIGNIFICANT CHANGE UP (ref 3.8–10.5)
WBC # BLD: 6.88 K/UL — SIGNIFICANT CHANGE UP (ref 3.8–10.5)
WBC # BLD: 6.91 K/UL — SIGNIFICANT CHANGE UP (ref 3.8–10.5)
WBC # BLD: 7 K/UL — SIGNIFICANT CHANGE UP (ref 3.8–10.5)
WBC # BLD: 7.02 K/UL — SIGNIFICANT CHANGE UP (ref 3.8–10.5)
WBC # BLD: 7.25 K/UL — SIGNIFICANT CHANGE UP (ref 3.8–10.5)
WBC # BLD: 7.25 K/UL — SIGNIFICANT CHANGE UP (ref 3.8–10.5)
WBC # BLD: 7.27 K/UL — SIGNIFICANT CHANGE UP (ref 3.8–10.5)
WBC # BLD: 7.4 K/UL — SIGNIFICANT CHANGE UP (ref 3.8–10.5)
WBC # BLD: 7.42 K/UL — SIGNIFICANT CHANGE UP (ref 3.8–10.5)
WBC # BLD: 7.55 K/UL — SIGNIFICANT CHANGE UP (ref 3.8–10.5)
WBC # BLD: 7.56 K/UL — SIGNIFICANT CHANGE UP (ref 3.8–10.5)
WBC # BLD: 7.59 K/UL — SIGNIFICANT CHANGE UP (ref 3.8–10.5)
WBC # BLD: 7.64 K/UL — SIGNIFICANT CHANGE UP (ref 3.8–10.5)
WBC # BLD: 7.69 K/UL — SIGNIFICANT CHANGE UP (ref 3.8–10.5)
WBC # BLD: 7.76 K/UL — SIGNIFICANT CHANGE UP (ref 3.8–10.5)
WBC # BLD: 7.86 K/UL — SIGNIFICANT CHANGE UP (ref 3.8–10.5)
WBC # BLD: 7.87 K/UL — SIGNIFICANT CHANGE UP (ref 3.8–10.5)
WBC # BLD: 8.18 K/UL — SIGNIFICANT CHANGE UP (ref 3.8–10.5)
WBC # BLD: 8.27 K/UL — SIGNIFICANT CHANGE UP (ref 3.8–10.5)
WBC # BLD: 8.52 K/UL — SIGNIFICANT CHANGE UP (ref 3.8–10.5)
WBC # BLD: 8.78 K/UL — SIGNIFICANT CHANGE UP (ref 3.8–10.5)
WBC # BLD: 9.53 K/UL — SIGNIFICANT CHANGE UP (ref 3.8–10.5)
WBC # BLD: 9.66 K/UL — SIGNIFICANT CHANGE UP (ref 3.8–10.5)
WBC # BLD: 9.89 K/UL — SIGNIFICANT CHANGE UP (ref 3.8–10.5)
WBC # FLD AUTO: 10.88 K/UL — HIGH (ref 3.8–10.5)
WBC # FLD AUTO: 6.26 K/UL — SIGNIFICANT CHANGE UP (ref 3.8–10.5)
WBC # FLD AUTO: 6.86 K/UL — SIGNIFICANT CHANGE UP (ref 3.8–10.5)
WBC # FLD AUTO: 6.88 K/UL — SIGNIFICANT CHANGE UP (ref 3.8–10.5)
WBC # FLD AUTO: 6.91 K/UL — SIGNIFICANT CHANGE UP (ref 3.8–10.5)
WBC # FLD AUTO: 7 K/UL — SIGNIFICANT CHANGE UP (ref 3.8–10.5)
WBC # FLD AUTO: 7.02 K/UL — SIGNIFICANT CHANGE UP (ref 3.8–10.5)
WBC # FLD AUTO: 7.25 K/UL — SIGNIFICANT CHANGE UP (ref 3.8–10.5)
WBC # FLD AUTO: 7.25 K/UL — SIGNIFICANT CHANGE UP (ref 3.8–10.5)
WBC # FLD AUTO: 7.27 K/UL — SIGNIFICANT CHANGE UP (ref 3.8–10.5)
WBC # FLD AUTO: 7.4 K/UL — SIGNIFICANT CHANGE UP (ref 3.8–10.5)
WBC # FLD AUTO: 7.42 K/UL — SIGNIFICANT CHANGE UP (ref 3.8–10.5)
WBC # FLD AUTO: 7.55 K/UL — SIGNIFICANT CHANGE UP (ref 3.8–10.5)
WBC # FLD AUTO: 7.56 K/UL — SIGNIFICANT CHANGE UP (ref 3.8–10.5)
WBC # FLD AUTO: 7.59 K/UL — SIGNIFICANT CHANGE UP (ref 3.8–10.5)
WBC # FLD AUTO: 7.64 K/UL — SIGNIFICANT CHANGE UP (ref 3.8–10.5)
WBC # FLD AUTO: 7.69 K/UL — SIGNIFICANT CHANGE UP (ref 3.8–10.5)
WBC # FLD AUTO: 7.76 K/UL — SIGNIFICANT CHANGE UP (ref 3.8–10.5)
WBC # FLD AUTO: 7.86 K/UL — SIGNIFICANT CHANGE UP (ref 3.8–10.5)
WBC # FLD AUTO: 7.87 K/UL — SIGNIFICANT CHANGE UP (ref 3.8–10.5)
WBC # FLD AUTO: 8.18 K/UL — SIGNIFICANT CHANGE UP (ref 3.8–10.5)
WBC # FLD AUTO: 8.27 K/UL — SIGNIFICANT CHANGE UP (ref 3.8–10.5)
WBC # FLD AUTO: 8.52 K/UL — SIGNIFICANT CHANGE UP (ref 3.8–10.5)
WBC # FLD AUTO: 8.78 K/UL — SIGNIFICANT CHANGE UP (ref 3.8–10.5)
WBC # FLD AUTO: 9.53 K/UL — SIGNIFICANT CHANGE UP (ref 3.8–10.5)
WBC # FLD AUTO: 9.66 K/UL — SIGNIFICANT CHANGE UP (ref 3.8–10.5)
WBC # FLD AUTO: 9.67 K/UL
WBC # FLD AUTO: 9.89 K/UL — SIGNIFICANT CHANGE UP (ref 3.8–10.5)
WBC UR QL: 202 /HPF — HIGH (ref 0–5)
WBC UR QL: 279 /HPF — HIGH (ref 0–5)
WBC UR QL: 399 /HPF — HIGH (ref 0–5)
WBC UR QL: >50 /HPF (ref 0–5)
WBC UR QL: >50 /HPF — HIGH (ref 0–5)

## 2023-01-01 PROCEDURE — 82728 ASSAY OF FERRITIN: CPT

## 2023-01-01 PROCEDURE — 84155 ASSAY OF PROTEIN SERUM: CPT

## 2023-01-01 PROCEDURE — 99232 SBSQ HOSP IP/OBS MODERATE 35: CPT | Mod: GC

## 2023-01-01 PROCEDURE — 99233 SBSQ HOSP IP/OBS HIGH 50: CPT

## 2023-01-01 PROCEDURE — 84295 ASSAY OF SERUM SODIUM: CPT

## 2023-01-01 PROCEDURE — 88307 TISSUE EXAM BY PATHOLOGIST: CPT

## 2023-01-01 PROCEDURE — 83935 ASSAY OF URINE OSMOLALITY: CPT

## 2023-01-01 PROCEDURE — 99232 SBSQ HOSP IP/OBS MODERATE 35: CPT

## 2023-01-01 PROCEDURE — 87077 CULTURE AEROBIC IDENTIFY: CPT

## 2023-01-01 PROCEDURE — 88305 TISSUE EXAM BY PATHOLOGIST: CPT | Mod: 26

## 2023-01-01 PROCEDURE — 85014 HEMATOCRIT: CPT

## 2023-01-01 PROCEDURE — 99214 OFFICE O/P EST MOD 30 MIN: CPT

## 2023-01-01 PROCEDURE — 84100 ASSAY OF PHOSPHORUS: CPT

## 2023-01-01 PROCEDURE — U0003: CPT

## 2023-01-01 PROCEDURE — 99233 SBSQ HOSP IP/OBS HIGH 50: CPT | Mod: GC

## 2023-01-01 PROCEDURE — 86334 IMMUNOFIX E-PHORESIS SERUM: CPT

## 2023-01-01 PROCEDURE — 99285 EMERGENCY DEPT VISIT HI MDM: CPT

## 2023-01-01 PROCEDURE — 87205 SMEAR GRAM STAIN: CPT

## 2023-01-01 PROCEDURE — 87070 CULTURE OTHR SPECIMN AEROBIC: CPT

## 2023-01-01 PROCEDURE — 82803 BLOOD GASES ANY COMBINATION: CPT

## 2023-01-01 PROCEDURE — 85610 PROTHROMBIN TIME: CPT

## 2023-01-01 PROCEDURE — 99239 HOSP IP/OBS DSCHRG MGMT >30: CPT | Mod: GC

## 2023-01-01 PROCEDURE — 86140 C-REACTIVE PROTEIN: CPT

## 2023-01-01 PROCEDURE — 73718 MRI LOWER EXTREMITY W/O DYE: CPT

## 2023-01-01 PROCEDURE — 83605 ASSAY OF LACTIC ACID: CPT

## 2023-01-01 PROCEDURE — 86900 BLOOD TYPING SEROLOGIC ABO: CPT

## 2023-01-01 PROCEDURE — 72146 MRI CHEST SPINE W/O DYE: CPT | Mod: 26

## 2023-01-01 PROCEDURE — 73630 X-RAY EXAM OF FOOT: CPT

## 2023-01-01 PROCEDURE — 93306 TTE W/DOPPLER COMPLETE: CPT | Mod: 26

## 2023-01-01 PROCEDURE — 36415 COLL VENOUS BLD VENIPUNCTURE: CPT

## 2023-01-01 PROCEDURE — 83735 ASSAY OF MAGNESIUM: CPT

## 2023-01-01 PROCEDURE — 82962 GLUCOSE BLOOD TEST: CPT

## 2023-01-01 PROCEDURE — 76770 US EXAM ABDO BACK WALL COMP: CPT | Mod: 26

## 2023-01-01 PROCEDURE — 20611 DRAIN/INJ JOINT/BURSA W/US: CPT | Mod: RT

## 2023-01-01 PROCEDURE — 82436 ASSAY OF URINE CHLORIDE: CPT

## 2023-01-01 PROCEDURE — 71045 X-RAY EXAM CHEST 1 VIEW: CPT

## 2023-01-01 PROCEDURE — 97110 THERAPEUTIC EXERCISES: CPT

## 2023-01-01 PROCEDURE — 84132 ASSAY OF SERUM POTASSIUM: CPT

## 2023-01-01 PROCEDURE — 72148 MRI LUMBAR SPINE W/O DYE: CPT

## 2023-01-01 PROCEDURE — 11042 DBRDMT SUBQ TIS 1ST 20SQCM/<: CPT | Mod: 58

## 2023-01-01 PROCEDURE — 80053 COMPREHEN METABOLIC PANEL: CPT

## 2023-01-01 PROCEDURE — 15275 SKIN SUB GRAFT FACE/NK/HF/G: CPT

## 2023-01-01 PROCEDURE — 93970 EXTREMITY STUDY: CPT

## 2023-01-01 PROCEDURE — 97162 PT EVAL MOD COMPLEX 30 MIN: CPT

## 2023-01-01 PROCEDURE — 93308 TTE F-UP OR LMTD: CPT | Mod: 26

## 2023-01-01 PROCEDURE — 11042 DBRDMT SUBQ TIS 1ST 20SQCM/<: CPT | Mod: 79

## 2023-01-01 PROCEDURE — 73610 X-RAY EXAM OF ANKLE: CPT

## 2023-01-01 PROCEDURE — 84145 PROCALCITONIN (PCT): CPT

## 2023-01-01 PROCEDURE — 99223 1ST HOSP IP/OBS HIGH 75: CPT

## 2023-01-01 PROCEDURE — 87186 SC STD MICRODIL/AGAR DIL: CPT

## 2023-01-01 PROCEDURE — 97164 PT RE-EVAL EST PLAN CARE: CPT

## 2023-01-01 PROCEDURE — 73630 X-RAY EXAM OF FOOT: CPT | Mod: 26,RT

## 2023-01-01 PROCEDURE — 83516 IMMUNOASSAY NONANTIBODY: CPT

## 2023-01-01 PROCEDURE — 72146 MRI CHEST SPINE W/O DYE: CPT

## 2023-01-01 PROCEDURE — 93321 DOPPLER ECHO F-UP/LMTD STD: CPT | Mod: 26

## 2023-01-01 PROCEDURE — 81001 URINALYSIS AUTO W/SCOPE: CPT

## 2023-01-01 PROCEDURE — 0225U NFCT DS DNA&RNA 21 SARSCOV2: CPT

## 2023-01-01 PROCEDURE — 80202 ASSAY OF VANCOMYCIN: CPT

## 2023-01-01 PROCEDURE — 82330 ASSAY OF CALCIUM: CPT

## 2023-01-01 PROCEDURE — 87075 CULTR BACTERIA EXCEPT BLOOD: CPT

## 2023-01-01 PROCEDURE — 86160 COMPLEMENT ANTIGEN: CPT

## 2023-01-01 PROCEDURE — 93005 ELECTROCARDIOGRAM TRACING: CPT

## 2023-01-01 PROCEDURE — 82570 ASSAY OF URINE CREATININE: CPT

## 2023-01-01 PROCEDURE — 97112 NEUROMUSCULAR REEDUCATION: CPT

## 2023-01-01 PROCEDURE — 88311 DECALCIFY TISSUE: CPT | Mod: 26

## 2023-01-01 PROCEDURE — 97530 THERAPEUTIC ACTIVITIES: CPT

## 2023-01-01 PROCEDURE — 99222 1ST HOSP IP/OBS MODERATE 55: CPT

## 2023-01-01 PROCEDURE — 85652 RBC SED RATE AUTOMATED: CPT

## 2023-01-01 PROCEDURE — 73718 MRI LOWER EXTREMITY W/O DYE: CPT | Mod: 26,RT

## 2023-01-01 PROCEDURE — 87507 IADNA-DNA/RNA PROBE TQ 12-25: CPT

## 2023-01-01 PROCEDURE — 86038 ANTINUCLEAR ANTIBODIES: CPT

## 2023-01-01 PROCEDURE — 70450 CT HEAD/BRAIN W/O DYE: CPT

## 2023-01-01 PROCEDURE — 85018 HEMOGLOBIN: CPT

## 2023-01-01 PROCEDURE — 74176 CT ABD & PELVIS W/O CONTRAST: CPT | Mod: MA

## 2023-01-01 PROCEDURE — 93306 TTE W/DOPPLER COMPLETE: CPT

## 2023-01-01 PROCEDURE — 73502 X-RAY EXAM HIP UNI 2-3 VIEWS: CPT | Mod: 26,RT

## 2023-01-01 PROCEDURE — 85027 COMPLETE CBC AUTOMATED: CPT

## 2023-01-01 PROCEDURE — 99223 1ST HOSP IP/OBS HIGH 75: CPT | Mod: GC

## 2023-01-01 PROCEDURE — 76770 US EXAM ABDO BACK WALL COMP: CPT

## 2023-01-01 PROCEDURE — 73502 X-RAY EXAM HIP UNI 2-3 VIEWS: CPT

## 2023-01-01 PROCEDURE — 88307 TISSUE EXAM BY PATHOLOGIST: CPT | Mod: 26

## 2023-01-01 PROCEDURE — 92950 HEART/LUNG RESUSCITATION CPR: CPT

## 2023-01-01 PROCEDURE — 83036 HEMOGLOBIN GLYCOSYLATED A1C: CPT

## 2023-01-01 PROCEDURE — 88305 TISSUE EXAM BY PATHOLOGIST: CPT

## 2023-01-01 PROCEDURE — 97605 NEG PRS WND THER DME<=50SQCM: CPT

## 2023-01-01 PROCEDURE — 84133 ASSAY OF URINE POTASSIUM: CPT

## 2023-01-01 PROCEDURE — 99222 1ST HOSP IP/OBS MODERATE 55: CPT | Mod: GC

## 2023-01-01 PROCEDURE — 85025 COMPLETE CBC W/AUTO DIFF WBC: CPT

## 2023-01-01 PROCEDURE — 74176 CT ABD & PELVIS W/O CONTRAST: CPT | Mod: 26,MA

## 2023-01-01 PROCEDURE — U0005: CPT

## 2023-01-01 PROCEDURE — 71045 X-RAY EXAM CHEST 1 VIEW: CPT | Mod: 26

## 2023-01-01 PROCEDURE — 93010 ELECTROCARDIOGRAM REPORT: CPT

## 2023-01-01 PROCEDURE — 73700 CT LOWER EXTREMITY W/O DYE: CPT | Mod: 26,RT

## 2023-01-01 PROCEDURE — 82550 ASSAY OF CK (CPK): CPT

## 2023-01-01 PROCEDURE — 73700 CT LOWER EXTREMITY W/O DYE: CPT

## 2023-01-01 PROCEDURE — 96375 TX/PRO/DX INJ NEW DRUG ADDON: CPT

## 2023-01-01 PROCEDURE — 87040 BLOOD CULTURE FOR BACTERIA: CPT

## 2023-01-01 PROCEDURE — 83880 ASSAY OF NATRIURETIC PEPTIDE: CPT

## 2023-01-01 PROCEDURE — 99283 EMERGENCY DEPT VISIT LOW MDM: CPT

## 2023-01-01 PROCEDURE — 84300 ASSAY OF URINE SODIUM: CPT

## 2023-01-01 PROCEDURE — 20611 DRAIN/INJ JOINT/BURSA W/US: CPT

## 2023-01-01 PROCEDURE — 12345: CPT | Mod: NC,GC

## 2023-01-01 PROCEDURE — C1889: CPT

## 2023-01-01 PROCEDURE — 74177 CT ABD & PELVIS W/CONTRAST: CPT | Mod: MA

## 2023-01-01 PROCEDURE — 82947 ASSAY GLUCOSE BLOOD QUANT: CPT

## 2023-01-01 PROCEDURE — 87086 URINE CULTURE/COLONY COUNT: CPT

## 2023-01-01 PROCEDURE — 73720 MRI LWR EXTREMITY W/O&W/DYE: CPT | Mod: 26,RT

## 2023-01-01 PROCEDURE — 72141 MRI NECK SPINE W/O DYE: CPT

## 2023-01-01 PROCEDURE — 84165 PROTEIN E-PHORESIS SERUM: CPT

## 2023-01-01 PROCEDURE — 99212 OFFICE O/P EST SF 10 MIN: CPT

## 2023-01-01 PROCEDURE — 87637 SARSCOV2&INF A&B&RSV AMP PRB: CPT

## 2023-01-01 PROCEDURE — 73720 MRI LWR EXTREMITY W/O&W/DYE: CPT

## 2023-01-01 PROCEDURE — 11042 DBRDMT SUBQ TIS 1ST 20SQCM/<: CPT

## 2023-01-01 PROCEDURE — 88311 DECALCIFY TISSUE: CPT

## 2023-01-01 PROCEDURE — 80048 BASIC METABOLIC PNL TOTAL CA: CPT

## 2023-01-01 PROCEDURE — 87640 STAPH A DNA AMP PROBE: CPT

## 2023-01-01 PROCEDURE — 99261: CPT

## 2023-01-01 PROCEDURE — 99231 SBSQ HOSP IP/OBS SF/LOW 25: CPT

## 2023-01-01 PROCEDURE — 83690 ASSAY OF LIPASE: CPT

## 2023-01-01 PROCEDURE — 86901 BLOOD TYPING SEROLOGIC RH(D): CPT

## 2023-01-01 PROCEDURE — 70450 CT HEAD/BRAIN W/O DYE: CPT | Mod: 26

## 2023-01-01 PROCEDURE — 84156 ASSAY OF PROTEIN URINE: CPT

## 2023-01-01 PROCEDURE — 96374 THER/PROPH/DIAG INJ IV PUSH: CPT

## 2023-01-01 PROCEDURE — 97116 GAIT TRAINING THERAPY: CPT

## 2023-01-01 PROCEDURE — 72141 MRI NECK SPINE W/O DYE: CPT | Mod: 26

## 2023-01-01 PROCEDURE — 73610 X-RAY EXAM OF ANKLE: CPT | Mod: 26,RT

## 2023-01-01 PROCEDURE — 82435 ASSAY OF BLOOD CHLORIDE: CPT

## 2023-01-01 PROCEDURE — 72148 MRI LUMBAR SPINE W/O DYE: CPT | Mod: 26

## 2023-01-01 PROCEDURE — 93321 DOPPLER ECHO F-UP/LMTD STD: CPT

## 2023-01-01 PROCEDURE — 99497 ADVNCD CARE PLAN 30 MIN: CPT | Mod: 25

## 2023-01-01 PROCEDURE — 83521 IG LIGHT CHAINS FREE EACH: CPT

## 2023-01-01 PROCEDURE — 87641 MR-STAPH DNA AMP PROBE: CPT

## 2023-01-01 PROCEDURE — 85730 THROMBOPLASTIN TIME PARTIAL: CPT

## 2023-01-01 PROCEDURE — 84484 ASSAY OF TROPONIN QUANT: CPT

## 2023-01-01 PROCEDURE — 82140 ASSAY OF AMMONIA: CPT

## 2023-01-01 PROCEDURE — 93308 TTE F-UP OR LMTD: CPT

## 2023-01-01 PROCEDURE — 82607 VITAMIN B-12: CPT

## 2023-01-01 PROCEDURE — 83540 ASSAY OF IRON: CPT

## 2023-01-01 PROCEDURE — 80061 LIPID PANEL: CPT

## 2023-01-01 PROCEDURE — 86036 ANCA SCREEN EACH ANTIBODY: CPT

## 2023-01-01 PROCEDURE — 72131 CT LUMBAR SPINE W/O DYE: CPT | Mod: 26,MA

## 2023-01-01 PROCEDURE — 11042 DBRDMT SUBQ TIS 1ST 20SQCM/<: CPT | Mod: 78

## 2023-01-01 PROCEDURE — 82746 ASSAY OF FOLIC ACID SERUM: CPT

## 2023-01-01 PROCEDURE — 99239 HOSP IP/OBS DSCHRG MGMT >30: CPT

## 2023-01-01 PROCEDURE — 87150 DNA/RNA AMPLIFIED PROBE: CPT

## 2023-01-01 PROCEDURE — 86850 RBC ANTIBODY SCREEN: CPT

## 2023-01-01 PROCEDURE — 84540 ASSAY OF URINE/UREA-N: CPT

## 2023-01-01 PROCEDURE — 74177 CT ABD & PELVIS W/CONTRAST: CPT | Mod: 26,MA

## 2023-01-01 PROCEDURE — 83550 IRON BINDING TEST: CPT

## 2023-01-01 PROCEDURE — 93970 EXTREMITY STUDY: CPT | Mod: 26

## 2023-01-01 PROCEDURE — 73502 X-RAY EXAM HIP UNI 2-3 VIEWS: CPT | Mod: 26,LT

## 2023-01-01 PROCEDURE — P9047: CPT

## 2023-01-01 PROCEDURE — 89051 BODY FLUID CELL COUNT: CPT

## 2023-01-01 DEVICE — GRAFT FISH SKIN OMEGA3 WOUND 7X10CM: Type: IMPLANTABLE DEVICE | Site: RIGHT | Status: FUNCTIONAL

## 2023-01-01 DEVICE — GRAFT FISH SKIN OMEGA3 MARIGEN MICRO 7SQCM: Type: IMPLANTABLE DEVICE | Site: RIGHT | Status: FUNCTIONAL

## 2023-01-01 DEVICE — SURGIFLO MATRIX WITH THROMBIN KIT: Type: IMPLANTABLE DEVICE | Site: RIGHT | Status: FUNCTIONAL

## 2023-01-01 RX ORDER — MAGNESIUM SULFATE 500 MG/ML
2 VIAL (ML) INJECTION ONCE
Refills: 0 | Status: COMPLETED | OUTPATIENT
Start: 2023-01-01 | End: 2023-01-01

## 2023-01-01 RX ORDER — LANOLIN ALCOHOL/MO/W.PET/CERES
3 CREAM (GRAM) TOPICAL AT BEDTIME
Refills: 0 | Status: DISCONTINUED | OUTPATIENT
Start: 2023-01-01 | End: 2023-01-01

## 2023-01-01 RX ORDER — VANCOMYCIN HCL 1 G
1500 VIAL (EA) INTRAVENOUS EVERY 24 HOURS
Refills: 0 | Status: DISCONTINUED | OUTPATIENT
Start: 2023-01-01 | End: 2023-01-01

## 2023-01-01 RX ORDER — TRAZODONE HCL 50 MG
25 TABLET ORAL ONCE
Refills: 0 | Status: COMPLETED | OUTPATIENT
Start: 2023-01-01 | End: 2023-01-01

## 2023-01-01 RX ORDER — DEXTROSE 50 % IN WATER 50 %
15 SYRINGE (ML) INTRAVENOUS ONCE
Refills: 0 | Status: DISCONTINUED | OUTPATIENT
Start: 2023-01-01 | End: 2023-01-01

## 2023-01-01 RX ORDER — HYDROMORPHONE HYDROCHLORIDE 2 MG/ML
1 INJECTION INTRAMUSCULAR; INTRAVENOUS; SUBCUTANEOUS EVERY 4 HOURS
Refills: 0 | Status: DISCONTINUED | OUTPATIENT
Start: 2023-01-01 | End: 2023-01-01

## 2023-01-01 RX ORDER — INSULIN GLARGINE 100 [IU]/ML
23 INJECTION, SOLUTION SUBCUTANEOUS AT BEDTIME
Refills: 0 | Status: DISCONTINUED | OUTPATIENT
Start: 2023-01-01 | End: 2023-01-01

## 2023-01-01 RX ORDER — ERTAPENEM SODIUM 1 G/1
INJECTION, POWDER, LYOPHILIZED, FOR SOLUTION INTRAMUSCULAR; INTRAVENOUS
Refills: 0 | Status: DISCONTINUED | OUTPATIENT
Start: 2023-01-01 | End: 2023-01-01

## 2023-01-01 RX ORDER — INSULIN LISPRO 100/ML
4 VIAL (ML) SUBCUTANEOUS
Refills: 0 | Status: DISCONTINUED | OUTPATIENT
Start: 2023-01-01 | End: 2023-01-01

## 2023-01-01 RX ORDER — INSULIN LISPRO 100/ML
6 VIAL (ML) SUBCUTANEOUS
Refills: 0 | Status: DISCONTINUED | OUTPATIENT
Start: 2023-01-01 | End: 2023-01-01

## 2023-01-01 RX ORDER — LIDOCAINE 4 G/100G
1 CREAM TOPICAL DAILY
Refills: 0 | Status: DISCONTINUED | OUTPATIENT
Start: 2023-01-01 | End: 2023-01-01

## 2023-01-01 RX ORDER — SENNA PLUS 8.6 MG/1
2 TABLET ORAL AT BEDTIME
Refills: 0 | Status: DISCONTINUED | OUTPATIENT
Start: 2023-01-01 | End: 2023-01-01

## 2023-01-01 RX ORDER — MORPHINE SULFATE 50 MG/1
5 CAPSULE, EXTENDED RELEASE ORAL EVERY 6 HOURS
Refills: 0 | Status: DISCONTINUED | OUTPATIENT
Start: 2023-01-01 | End: 2023-01-01

## 2023-01-01 RX ORDER — DEXTROSE 50 % IN WATER 50 %
12.5 SYRINGE (ML) INTRAVENOUS ONCE
Refills: 0 | Status: DISCONTINUED | OUTPATIENT
Start: 2023-01-01 | End: 2023-01-01

## 2023-01-01 RX ORDER — ACETAMINOPHEN 500 MG
1000 TABLET ORAL ONCE
Refills: 0 | Status: DISCONTINUED | OUTPATIENT
Start: 2023-01-01 | End: 2023-01-01

## 2023-01-01 RX ORDER — LIDOCAINE 4 G/100G
3 CREAM TOPICAL EVERY 24 HOURS
Refills: 0 | Status: DISCONTINUED | OUTPATIENT
Start: 2023-01-01 | End: 2023-01-01

## 2023-01-01 RX ORDER — HEPARIN SODIUM 5000 [USP'U]/ML
5000 INJECTION INTRAVENOUS; SUBCUTANEOUS EVERY 8 HOURS
Refills: 0 | Status: DISCONTINUED | OUTPATIENT
Start: 2023-01-01 | End: 2023-01-01

## 2023-01-01 RX ORDER — INSULIN LISPRO 100/ML
12 VIAL (ML) SUBCUTANEOUS
Refills: 0 | Status: DISCONTINUED | OUTPATIENT
Start: 2023-01-01 | End: 2023-01-01

## 2023-01-01 RX ORDER — HEPARIN SODIUM 5000 [USP'U]/ML
5000 INJECTION INTRAVENOUS; SUBCUTANEOUS EVERY 8 HOURS
Refills: 0 | Status: COMPLETED | OUTPATIENT
Start: 2023-01-01 | End: 2023-01-01

## 2023-01-01 RX ORDER — INSULIN GLARGINE 100 [IU]/ML
7 INJECTION, SOLUTION SUBCUTANEOUS AT BEDTIME
Refills: 0 | Status: DISCONTINUED | OUTPATIENT
Start: 2023-01-01 | End: 2023-01-01

## 2023-01-01 RX ORDER — SODIUM CHLORIDE 9 MG/ML
1000 INJECTION, SOLUTION INTRAVENOUS
Refills: 0 | Status: DISCONTINUED | OUTPATIENT
Start: 2023-01-01 | End: 2023-01-01

## 2023-01-01 RX ORDER — INSULIN LISPRO 100/ML
VIAL (ML) SUBCUTANEOUS
Refills: 0 | Status: DISCONTINUED | OUTPATIENT
Start: 2023-01-01 | End: 2023-01-01

## 2023-01-01 RX ORDER — MYCOPHENOLATE MOFETIL 250 MG/1
500 CAPSULE ORAL
Refills: 0 | Status: DISCONTINUED | OUTPATIENT
Start: 2023-01-01 | End: 2023-01-01

## 2023-01-01 RX ORDER — SODIUM CHLORIDE 9 MG/ML
1000 INJECTION INTRAMUSCULAR; INTRAVENOUS; SUBCUTANEOUS ONCE
Refills: 0 | Status: COMPLETED | OUTPATIENT
Start: 2023-01-01 | End: 2023-01-01

## 2023-01-01 RX ORDER — CEFPODOXIME PROXETIL 100 MG
1 TABLET ORAL
Qty: 0 | Refills: 0 | DISCHARGE
Start: 2023-01-01

## 2023-01-01 RX ORDER — INSULIN LISPRO 100/ML
8 VIAL (ML) SUBCUTANEOUS
Refills: 0 | Status: DISCONTINUED | OUTPATIENT
Start: 2023-01-01 | End: 2023-01-01

## 2023-01-01 RX ORDER — DEXTROSE 50 % IN WATER 50 %
25 SYRINGE (ML) INTRAVENOUS ONCE
Refills: 0 | Status: DISCONTINUED | OUTPATIENT
Start: 2023-01-01 | End: 2023-01-01

## 2023-01-01 RX ORDER — SODIUM BICARBONATE 1 MEQ/ML
0.07 SYRINGE (ML) INTRAVENOUS
Qty: 75 | Refills: 0 | Status: DISCONTINUED | OUTPATIENT
Start: 2023-01-01 | End: 2023-01-01

## 2023-01-01 RX ORDER — CYCLOBENZAPRINE HYDROCHLORIDE 10 MG/1
10 TABLET, FILM COATED ORAL ONCE
Refills: 0 | Status: COMPLETED | OUTPATIENT
Start: 2023-01-01 | End: 2023-01-01

## 2023-01-01 RX ORDER — PANTOPRAZOLE SODIUM 20 MG/1
40 TABLET, DELAYED RELEASE ORAL
Refills: 0 | Status: DISCONTINUED | OUTPATIENT
Start: 2023-01-01 | End: 2023-01-01

## 2023-01-01 RX ORDER — HYDROMORPHONE HYDROCHLORIDE 2 MG/ML
1 INJECTION INTRAMUSCULAR; INTRAVENOUS; SUBCUTANEOUS EVERY 6 HOURS
Refills: 0 | Status: DISCONTINUED | OUTPATIENT
Start: 2023-01-01 | End: 2023-01-01

## 2023-01-01 RX ORDER — INSULIN GLARGINE 100 [IU]/ML
26 INJECTION, SOLUTION SUBCUTANEOUS AT BEDTIME
Refills: 0 | Status: DISCONTINUED | OUTPATIENT
Start: 2023-01-01 | End: 2023-01-01

## 2023-01-01 RX ORDER — VANCOMYCIN HCL 1 G
1250 VIAL (EA) INTRAVENOUS EVERY 24 HOURS
Refills: 0 | Status: DISCONTINUED | OUTPATIENT
Start: 2023-01-01 | End: 2023-01-01

## 2023-01-01 RX ORDER — MYCOPHENOLATE MOFETIL 250 MG/1
250 CAPSULE ORAL TWICE DAILY
Qty: 180 | Refills: 5 | Status: ACTIVE | COMMUNITY
Start: 2022-01-01 | End: 1900-01-01

## 2023-01-01 RX ORDER — VALGANCICLOVIR 450 MG/1
1 TABLET, FILM COATED ORAL
Qty: 40 | Refills: 0
Start: 2023-01-01 | End: 2023-01-01

## 2023-01-01 RX ORDER — VANCOMYCIN HCL 1 G
1000 VIAL (EA) INTRAVENOUS ONCE
Refills: 0 | Status: DISCONTINUED | OUTPATIENT
Start: 2023-01-01 | End: 2023-01-01

## 2023-01-01 RX ORDER — TAMSULOSIN HYDROCHLORIDE 0.4 MG/1
1 CAPSULE ORAL
Refills: 0 | DISCHARGE

## 2023-01-01 RX ORDER — PSYLLIUM SEED (WITH DEXTROSE)
1 POWDER (GRAM) ORAL
Qty: 0 | Refills: 0 | DISCHARGE
Start: 2023-01-01

## 2023-01-01 RX ORDER — INSULIN GLARGINE 100 [IU]/ML
10 INJECTION, SOLUTION SUBCUTANEOUS AT BEDTIME
Refills: 0 | Status: DISCONTINUED | OUTPATIENT
Start: 2023-01-01 | End: 2023-01-01

## 2023-01-01 RX ORDER — SERTRALINE 25 MG/1
1 TABLET, FILM COATED ORAL
Refills: 0 | DISCHARGE

## 2023-01-01 RX ORDER — VALGANCICLOVIR 450 MG/1
450 TABLET, FILM COATED ORAL DAILY
Refills: 0 | Status: DISCONTINUED | OUTPATIENT
Start: 2023-01-01 | End: 2023-01-01

## 2023-01-01 RX ORDER — VANCOMYCIN HCL 1 G
1000 VIAL (EA) INTRAVENOUS ONCE
Refills: 0 | Status: COMPLETED | OUTPATIENT
Start: 2023-01-01 | End: 2023-01-01

## 2023-01-01 RX ORDER — ALPRAZOLAM 0.25 MG
0.5 TABLET ORAL ONCE
Refills: 0 | Status: DISCONTINUED | OUTPATIENT
Start: 2023-01-01 | End: 2023-01-01

## 2023-01-01 RX ORDER — GLUCAGON INJECTION, SOLUTION 0.5 MG/.1ML
1 INJECTION, SOLUTION SUBCUTANEOUS ONCE
Refills: 0 | Status: DISCONTINUED | OUTPATIENT
Start: 2023-01-01 | End: 2023-01-01

## 2023-01-01 RX ORDER — ALBUMIN HUMAN 25 %
100 VIAL (ML) INTRAVENOUS EVERY 8 HOURS
Refills: 0 | Status: COMPLETED | OUTPATIENT
Start: 2023-01-01 | End: 2023-01-01

## 2023-01-01 RX ORDER — HYDROMORPHONE HYDROCHLORIDE 2 MG/ML
0.5 INJECTION INTRAMUSCULAR; INTRAVENOUS; SUBCUTANEOUS
Refills: 0 | Status: DISCONTINUED | OUTPATIENT
Start: 2023-01-01 | End: 2023-01-01

## 2023-01-01 RX ORDER — CEFPODOXIME PROXETIL 100 MG
200 TABLET ORAL EVERY 12 HOURS
Refills: 0 | Status: DISCONTINUED | OUTPATIENT
Start: 2023-01-01 | End: 2023-01-01

## 2023-01-01 RX ORDER — CEFTRIAXONE 500 MG/1
1000 INJECTION, POWDER, FOR SOLUTION INTRAMUSCULAR; INTRAVENOUS EVERY 24 HOURS
Refills: 0 | Status: DISCONTINUED | OUTPATIENT
Start: 2023-01-01 | End: 2023-01-01

## 2023-01-01 RX ORDER — SERTRALINE 25 MG/1
100 TABLET, FILM COATED ORAL DAILY
Refills: 0 | Status: DISCONTINUED | OUTPATIENT
Start: 2023-01-01 | End: 2023-01-01

## 2023-01-01 RX ORDER — ACETAMINOPHEN 500 MG
650 TABLET ORAL EVERY 6 HOURS
Refills: 0 | Status: DISCONTINUED | OUTPATIENT
Start: 2023-01-01 | End: 2023-01-01

## 2023-01-01 RX ORDER — INSULIN LISPRO 100/ML
8 VIAL (ML) SUBCUTANEOUS ONCE
Refills: 0 | Status: COMPLETED | OUTPATIENT
Start: 2023-01-01 | End: 2023-01-01

## 2023-01-01 RX ORDER — HALOPERIDOL DECANOATE 100 MG/ML
5 INJECTION INTRAMUSCULAR ONCE
Refills: 0 | Status: COMPLETED | OUTPATIENT
Start: 2023-01-01 | End: 2023-01-01

## 2023-01-01 RX ORDER — VANCOMYCIN HCL 1 G
1250 VIAL (EA) INTRAVENOUS ONCE
Refills: 0 | Status: DISCONTINUED | OUTPATIENT
Start: 2023-01-01 | End: 2023-01-01

## 2023-01-01 RX ORDER — INSULIN GLARGINE 100 [IU]/ML
14 INJECTION, SOLUTION SUBCUTANEOUS AT BEDTIME
Refills: 0 | Status: DISCONTINUED | OUTPATIENT
Start: 2023-01-01 | End: 2023-01-01

## 2023-01-01 RX ORDER — ACETAMINOPHEN 500 MG
1000 TABLET ORAL ONCE
Refills: 0 | Status: COMPLETED | OUTPATIENT
Start: 2023-01-01 | End: 2023-01-01

## 2023-01-01 RX ORDER — FOLIC ACID 0.8 MG
1 TABLET ORAL DAILY
Refills: 0 | Status: DISCONTINUED | OUTPATIENT
Start: 2023-01-01 | End: 2023-01-01

## 2023-01-01 RX ORDER — INSULIN GLARGINE 100 [IU]/ML
16 INJECTION, SOLUTION SUBCUTANEOUS AT BEDTIME
Refills: 0 | Status: DISCONTINUED | OUTPATIENT
Start: 2023-01-01 | End: 2023-01-01

## 2023-01-01 RX ORDER — METRONIDAZOLE 500 MG
1 TABLET ORAL
Qty: 0 | Refills: 0 | DISCHARGE
Start: 2023-01-01

## 2023-01-01 RX ORDER — METRONIDAZOLE 500 MG
500 TABLET ORAL
Refills: 0 | Status: DISCONTINUED | OUTPATIENT
Start: 2023-01-01 | End: 2023-01-01

## 2023-01-01 RX ORDER — PIPERACILLIN AND TAZOBACTAM 4; .5 G/20ML; G/20ML
3.38 INJECTION, POWDER, LYOPHILIZED, FOR SOLUTION INTRAVENOUS ONCE
Refills: 0 | Status: COMPLETED | OUTPATIENT
Start: 2023-01-01 | End: 2023-01-01

## 2023-01-01 RX ORDER — INSULIN LISPRO 100/ML
8 VIAL (ML) SUBCUTANEOUS
Qty: 0 | Refills: 0 | DISCHARGE
Start: 2023-01-01

## 2023-01-01 RX ORDER — SODIUM CHLORIDE 9 MG/ML
250 INJECTION INTRAMUSCULAR; INTRAVENOUS; SUBCUTANEOUS ONCE
Refills: 0 | Status: COMPLETED | OUTPATIENT
Start: 2023-01-01 | End: 2023-01-01

## 2023-01-01 RX ORDER — INSULIN LISPRO 100/ML
9 VIAL (ML) SUBCUTANEOUS
Qty: 0 | Refills: 0 | DISCHARGE

## 2023-01-01 RX ORDER — LIDOCAINE 4 G/100G
1 CREAM TOPICAL
Qty: 0 | Refills: 0 | DISCHARGE
Start: 2023-01-01

## 2023-01-01 RX ORDER — THIAMINE MONONITRATE (VIT B1) 100 MG
500 TABLET ORAL EVERY 8 HOURS
Refills: 0 | Status: COMPLETED | OUTPATIENT
Start: 2023-01-01 | End: 2023-01-01

## 2023-01-01 RX ORDER — ACETAMINOPHEN 500 MG
650 TABLET ORAL ONCE
Refills: 0 | Status: COMPLETED | OUTPATIENT
Start: 2023-01-01 | End: 2023-01-01

## 2023-01-01 RX ORDER — CEFEPIME 1 G/1
2000 INJECTION, POWDER, FOR SOLUTION INTRAMUSCULAR; INTRAVENOUS ONCE
Refills: 0 | Status: COMPLETED | OUTPATIENT
Start: 2023-01-01 | End: 2023-01-01

## 2023-01-01 RX ORDER — INSULIN GLARGINE 100 [IU]/ML
26 INJECTION, SOLUTION SUBCUTANEOUS
Qty: 0 | Refills: 0 | DISCHARGE
Start: 2023-01-01

## 2023-01-01 RX ORDER — SODIUM CHLORIDE 9 MG/ML
500 INJECTION, SOLUTION INTRAVENOUS ONCE
Refills: 0 | Status: COMPLETED | OUTPATIENT
Start: 2023-01-01 | End: 2023-01-01

## 2023-01-01 RX ORDER — PIPERACILLIN AND TAZOBACTAM 4; .5 G/20ML; G/20ML
3.38 INJECTION, POWDER, LYOPHILIZED, FOR SOLUTION INTRAVENOUS EVERY 8 HOURS
Refills: 0 | Status: DISCONTINUED | OUTPATIENT
Start: 2023-01-01 | End: 2023-01-01

## 2023-01-01 RX ORDER — SODIUM BICARBONATE 1 MEQ/ML
650 SYRINGE (ML) INTRAVENOUS THREE TIMES A DAY
Refills: 0 | Status: DISCONTINUED | OUTPATIENT
Start: 2023-01-01 | End: 2023-01-01

## 2023-01-01 RX ORDER — PSYLLIUM SEED (WITH DEXTROSE)
1 POWDER (GRAM) ORAL DAILY
Refills: 0 | Status: DISCONTINUED | OUTPATIENT
Start: 2023-01-01 | End: 2023-01-01

## 2023-01-01 RX ORDER — INSULIN LISPRO 100/ML
5 VIAL (ML) SUBCUTANEOUS
Refills: 0 | Status: DISCONTINUED | OUTPATIENT
Start: 2023-01-01 | End: 2023-01-01

## 2023-01-01 RX ORDER — INSULIN LISPRO 100/ML
VIAL (ML) SUBCUTANEOUS AT BEDTIME
Refills: 0 | Status: DISCONTINUED | OUTPATIENT
Start: 2023-01-01 | End: 2023-01-01

## 2023-01-01 RX ORDER — ALBUMIN HUMAN 25 %
100 VIAL (ML) INTRAVENOUS EVERY 8 HOURS
Refills: 0 | Status: DISCONTINUED | OUTPATIENT
Start: 2023-01-01 | End: 2023-01-01

## 2023-01-01 RX ORDER — TAMSULOSIN HYDROCHLORIDE 0.4 MG/1
0.4 CAPSULE ORAL AT BEDTIME
Refills: 0 | Status: DISCONTINUED | OUTPATIENT
Start: 2023-01-01 | End: 2023-01-01

## 2023-01-01 RX ORDER — CHLORHEXIDINE GLUCONATE 213 G/1000ML
1 SOLUTION TOPICAL DAILY
Refills: 0 | Status: DISCONTINUED | OUTPATIENT
Start: 2023-01-01 | End: 2023-01-01

## 2023-01-01 RX ORDER — INSULIN LISPRO 100/ML
VIAL (ML) SUBCUTANEOUS EVERY 6 HOURS
Refills: 0 | Status: DISCONTINUED | OUTPATIENT
Start: 2023-01-01 | End: 2023-01-01

## 2023-01-01 RX ORDER — METHOCARBAMOL 500 MG/1
750 TABLET, FILM COATED ORAL EVERY 8 HOURS
Refills: 0 | Status: DISCONTINUED | OUTPATIENT
Start: 2023-01-01 | End: 2023-01-01

## 2023-01-01 RX ORDER — DEXTROSE 50 % IN WATER 50 %
50 SYRINGE (ML) INTRAVENOUS
Qty: 0 | Refills: 0 | DISCHARGE
Start: 2023-01-01

## 2023-01-01 RX ORDER — OMEGA-3-ACID ETHYL ESTERS CAPSULES 1 G/1
1 CAPSULE, LIQUID FILLED ORAL
Qty: 180 | Refills: 0 | Status: ACTIVE | COMMUNITY
Start: 2021-07-30 | End: 1900-01-01

## 2023-01-01 RX ORDER — VALGANCICLOVIR 450 MG/1
450 TABLET, FILM COATED ORAL EVERY 12 HOURS
Refills: 0 | Status: DISCONTINUED | OUTPATIENT
Start: 2023-01-01 | End: 2023-01-01

## 2023-01-01 RX ORDER — FUROSEMIDE 40 MG
80 TABLET ORAL ONCE
Refills: 0 | Status: DISCONTINUED | OUTPATIENT
Start: 2023-01-01 | End: 2023-01-01

## 2023-01-01 RX ORDER — INSULIN LISPRO 100/ML
12 VIAL (ML) SUBCUTANEOUS
Qty: 0 | Refills: 0 | DISCHARGE
Start: 2023-01-01

## 2023-01-01 RX ORDER — VALGANCICLOVIR HYDROCHLORIDE 450 MG/1
450 TABLET ORAL
Qty: 30 | Refills: 5 | Status: ACTIVE | COMMUNITY
Start: 2021-11-29 | End: 1900-01-01

## 2023-01-01 RX ORDER — LACTOBACILLUS ACIDOPHILUS 100MM CELL
1 CAPSULE ORAL
Qty: 0 | Refills: 0 | DISCHARGE
Start: 2023-01-01

## 2023-01-01 RX ORDER — LIDOCAINE 4 G/100G
1 CREAM TOPICAL
Qty: 6 | Refills: 0
Start: 2023-01-01 | End: 2023-07-28

## 2023-01-01 RX ORDER — SODIUM BICARBONATE 1 MEQ/ML
50 SYRINGE (ML) INTRAVENOUS ONCE
Refills: 0 | Status: COMPLETED | OUTPATIENT
Start: 2023-01-01 | End: 2023-01-01

## 2023-01-01 RX ORDER — DICLOFENAC SODIUM 30 MG/G
4 GEL TOPICAL
Refills: 0 | Status: DISCONTINUED | OUTPATIENT
Start: 2023-01-01 | End: 2023-01-01

## 2023-01-01 RX ORDER — VANCOMYCIN HCL 1 G
1500 VIAL (EA) INTRAVENOUS ONCE
Refills: 0 | Status: DISCONTINUED | OUTPATIENT
Start: 2023-01-01 | End: 2023-01-01

## 2023-01-01 RX ORDER — PREDNISONE 10 MG/1
10 TABLET ORAL DAILY
Qty: 30 | Refills: 5 | Status: ACTIVE | COMMUNITY
Start: 2022-01-01 | End: 1900-01-01

## 2023-01-01 RX ORDER — MYCOPHENOLATE MOFETIL 250 MG/1
1 CAPSULE ORAL
Qty: 60 | Refills: 0
Start: 2023-01-01 | End: 2023-01-01

## 2023-01-01 RX ORDER — PANTOPRAZOLE 40 MG/1
40 TABLET, DELAYED RELEASE ORAL
Qty: 90 | Refills: 3 | Status: ACTIVE | COMMUNITY
Start: 1900-01-01 | End: 1900-01-01

## 2023-01-01 RX ORDER — VALGANCICLOVIR 450 MG/1
1 TABLET, FILM COATED ORAL
Refills: 0 | DISCHARGE

## 2023-01-01 RX ORDER — MEROPENEM 1 G/30ML
1000 INJECTION INTRAVENOUS EVERY 12 HOURS
Refills: 0 | Status: DISCONTINUED | OUTPATIENT
Start: 2023-01-01 | End: 2023-01-01

## 2023-01-01 RX ORDER — INSULIN LISPRO 100/ML
2 VIAL (ML) SUBCUTANEOUS
Refills: 0 | Status: DISCONTINUED | OUTPATIENT
Start: 2023-01-01 | End: 2023-01-01

## 2023-01-01 RX ORDER — ONDANSETRON 8 MG/1
4 TABLET, FILM COATED ORAL ONCE
Refills: 0 | Status: DISCONTINUED | OUTPATIENT
Start: 2023-01-01 | End: 2023-01-01

## 2023-01-01 RX ORDER — CEFTRIAXONE 500 MG/1
2000 INJECTION, POWDER, FOR SOLUTION INTRAMUSCULAR; INTRAVENOUS EVERY 24 HOURS
Refills: 0 | Status: DISCONTINUED | OUTPATIENT
Start: 2023-01-01 | End: 2023-01-01

## 2023-01-01 RX ORDER — LACTOBACILLUS ACIDOPHILUS 100MM CELL
1 CAPSULE ORAL DAILY
Refills: 0 | Status: DISCONTINUED | OUTPATIENT
Start: 2023-01-01 | End: 2023-01-01

## 2023-01-01 RX ORDER — INSULIN LISPRO 100/ML
1 VIAL (ML) SUBCUTANEOUS
Refills: 0 | Status: COMPLETED | OUTPATIENT
Start: 2023-01-01 | End: 2023-01-01

## 2023-01-01 RX ORDER — LIDOCAINE 4 G/100G
1 CREAM TOPICAL ONCE
Refills: 0 | Status: COMPLETED | OUTPATIENT
Start: 2023-01-01 | End: 2023-01-01

## 2023-01-01 RX ORDER — HYDROMORPHONE HYDROCHLORIDE 2 MG/ML
2 INJECTION INTRAMUSCULAR; INTRAVENOUS; SUBCUTANEOUS EVERY 6 HOURS
Refills: 0 | Status: DISCONTINUED | OUTPATIENT
Start: 2023-01-01 | End: 2023-01-01

## 2023-01-01 RX ORDER — INSULIN GLARGINE 100 [IU]/ML
14 INJECTION, SOLUTION SUBCUTANEOUS
Refills: 0 | DISCHARGE

## 2023-01-01 RX ORDER — VANCOMYCIN HCL 1 G
1500 VIAL (EA) INTRAVENOUS ONCE
Refills: 0 | Status: COMPLETED | OUTPATIENT
Start: 2023-01-01 | End: 2023-01-01

## 2023-01-01 RX ORDER — ACETAMINOPHEN 500 MG
975 TABLET ORAL ONCE
Refills: 0 | Status: COMPLETED | OUTPATIENT
Start: 2023-01-01 | End: 2023-01-01

## 2023-01-01 RX ORDER — INSULIN GLARGINE 100 [IU]/ML
34 INJECTION, SOLUTION SUBCUTANEOUS
Qty: 0 | Refills: 0 | DISCHARGE
Start: 2023-01-01

## 2023-01-01 RX ORDER — KETOROLAC TROMETHAMINE 30 MG/ML
15 SYRINGE (ML) INJECTION ONCE
Refills: 0 | Status: DISCONTINUED | OUTPATIENT
Start: 2023-01-01 | End: 2023-01-01

## 2023-01-01 RX ORDER — PIPERACILLIN AND TAZOBACTAM 4; .5 G/20ML; G/20ML
3.38 INJECTION, POWDER, LYOPHILIZED, FOR SOLUTION INTRAVENOUS EVERY 12 HOURS
Refills: 0 | Status: DISCONTINUED | OUTPATIENT
Start: 2023-01-01 | End: 2023-01-01

## 2023-01-01 RX ORDER — CEFEPIME 1 G/1
1000 INJECTION, POWDER, FOR SOLUTION INTRAMUSCULAR; INTRAVENOUS ONCE
Refills: 0 | Status: DISCONTINUED | OUTPATIENT
Start: 2023-01-01 | End: 2023-01-01

## 2023-01-01 RX ORDER — ACETAMINOPHEN 500 MG
975 TABLET ORAL EVERY 8 HOURS
Refills: 0 | Status: DISCONTINUED | OUTPATIENT
Start: 2023-01-01 | End: 2023-01-01

## 2023-01-01 RX ORDER — MYCOPHENOLATE MOFETIL 250 MG/1
1 CAPSULE ORAL
Refills: 0 | DISCHARGE

## 2023-01-01 RX ORDER — ERTAPENEM SODIUM 1 G/1
1000 INJECTION, POWDER, LYOPHILIZED, FOR SOLUTION INTRAMUSCULAR; INTRAVENOUS EVERY 24 HOURS
Refills: 0 | Status: DISCONTINUED | OUTPATIENT
Start: 2023-01-01 | End: 2023-01-01

## 2023-01-01 RX ORDER — INSULIN GLARGINE 100 [IU]/ML
34 INJECTION, SOLUTION SUBCUTANEOUS AT BEDTIME
Refills: 0 | Status: DISCONTINUED | OUTPATIENT
Start: 2023-01-01 | End: 2023-01-01

## 2023-01-01 RX ORDER — SODIUM BICARBONATE 1 MEQ/ML
0.05 SYRINGE (ML) INTRAVENOUS
Qty: 75 | Refills: 0 | Status: DISCONTINUED | OUTPATIENT
Start: 2023-01-01 | End: 2023-01-01

## 2023-01-01 RX ORDER — SODIUM CHLORIDE 9 MG/ML
500 INJECTION INTRAMUSCULAR; INTRAVENOUS; SUBCUTANEOUS ONCE
Refills: 0 | Status: COMPLETED | OUTPATIENT
Start: 2023-01-01 | End: 2023-01-01

## 2023-01-01 RX ORDER — INSULIN GLARGINE 100 [IU]/ML
20 INJECTION, SOLUTION SUBCUTANEOUS AT BEDTIME
Refills: 0 | Status: DISCONTINUED | OUTPATIENT
Start: 2023-01-01 | End: 2023-01-01

## 2023-01-01 RX ORDER — INSULIN GLARGINE 100 [IU]/ML
18 INJECTION, SOLUTION SUBCUTANEOUS AT BEDTIME
Refills: 0 | Status: DISCONTINUED | OUTPATIENT
Start: 2023-01-01 | End: 2023-01-01

## 2023-01-01 RX ORDER — CEFUROXIME AXETIL 250 MG
1 TABLET ORAL
Qty: 28 | Refills: 0
Start: 2023-01-01 | End: 2023-01-01

## 2023-01-01 RX ORDER — SODIUM CHLORIDE 9 MG/ML
1000 INJECTION INTRAMUSCULAR; INTRAVENOUS; SUBCUTANEOUS ONCE
Refills: 0 | Status: DISCONTINUED | OUTPATIENT
Start: 2023-01-01 | End: 2023-01-01

## 2023-01-01 RX ORDER — ERTAPENEM SODIUM 1 G/1
1000 INJECTION, POWDER, LYOPHILIZED, FOR SOLUTION INTRAMUSCULAR; INTRAVENOUS ONCE
Refills: 0 | Status: COMPLETED | OUTPATIENT
Start: 2023-01-01 | End: 2023-01-01

## 2023-01-01 RX ORDER — ONDANSETRON 8 MG/1
4 TABLET, FILM COATED ORAL EVERY 8 HOURS
Refills: 0 | Status: DISCONTINUED | OUTPATIENT
Start: 2023-01-01 | End: 2023-01-01

## 2023-01-01 RX ORDER — SODIUM HYPOCHLORITE 0.125 %
1 SOLUTION, NON-ORAL MISCELLANEOUS DAILY
Refills: 0 | Status: DISCONTINUED | OUTPATIENT
Start: 2023-01-01 | End: 2023-01-01

## 2023-01-01 RX ORDER — SODIUM CHLORIDE 9 MG/ML
1000 INJECTION, SOLUTION INTRAVENOUS ONCE
Refills: 0 | Status: COMPLETED | OUTPATIENT
Start: 2023-01-01 | End: 2023-01-01

## 2023-01-01 RX ORDER — CEFTRIAXONE 500 MG/1
1000 INJECTION, POWDER, FOR SOLUTION INTRAMUSCULAR; INTRAVENOUS ONCE
Refills: 0 | Status: COMPLETED | OUTPATIENT
Start: 2023-01-01 | End: 2023-01-01

## 2023-01-01 RX ORDER — DEXTROSE 50 % IN WATER 50 %
25 SYRINGE (ML) INTRAVENOUS
Qty: 0 | Refills: 0 | DISCHARGE
Start: 2023-01-01

## 2023-01-01 RX ORDER — DEXTROSE 50 % IN WATER 50 %
1 SYRINGE (ML) INTRAVENOUS
Qty: 0 | Refills: 0 | DISCHARGE
Start: 2023-01-01

## 2023-01-01 RX ORDER — QUETIAPINE FUMARATE 200 MG/1
25 TABLET, FILM COATED ORAL AT BEDTIME
Refills: 0 | Status: DISCONTINUED | OUTPATIENT
Start: 2023-01-01 | End: 2023-01-01

## 2023-01-01 RX ORDER — FENTANYL CITRATE 50 UG/ML
25 INJECTION INTRAVENOUS ONCE
Refills: 0 | Status: DISCONTINUED | OUTPATIENT
Start: 2023-01-01 | End: 2023-01-01

## 2023-01-01 RX ORDER — METHOCARBAMOL 500 MG/1
750 TABLET, FILM COATED ORAL EVERY 6 HOURS
Refills: 0 | Status: DISCONTINUED | OUTPATIENT
Start: 2023-01-01 | End: 2023-01-01

## 2023-01-01 RX ORDER — MUPIROCIN 20 MG/G
2 OINTMENT TOPICAL
Qty: 1 | Refills: 2 | Status: ACTIVE | COMMUNITY
Start: 2023-01-01 | End: 1900-01-01

## 2023-01-01 RX ORDER — LACTULOSE 10 G/15ML
10 SOLUTION ORAL THREE TIMES A DAY
Refills: 0 | Status: DISCONTINUED | OUTPATIENT
Start: 2023-01-01 | End: 2023-01-01

## 2023-01-01 RX ADMIN — Medication 975 MILLIGRAM(S): at 23:00

## 2023-01-01 RX ADMIN — INSULIN GLARGINE 10 UNIT(S): 100 INJECTION, SOLUTION SUBCUTANEOUS at 21:55

## 2023-01-01 RX ADMIN — HEPARIN SODIUM 5000 UNIT(S): 5000 INJECTION INTRAVENOUS; SUBCUTANEOUS at 14:33

## 2023-01-01 RX ADMIN — Medication 500 MILLIGRAM(S): at 17:53

## 2023-01-01 RX ADMIN — VALGANCICLOVIR 450 MILLIGRAM(S): 450 TABLET, FILM COATED ORAL at 21:25

## 2023-01-01 RX ADMIN — HEPARIN SODIUM 5000 UNIT(S): 5000 INJECTION INTRAVENOUS; SUBCUTANEOUS at 05:38

## 2023-01-01 RX ADMIN — Medication 975 MILLIGRAM(S): at 21:47

## 2023-01-01 RX ADMIN — PANTOPRAZOLE SODIUM 40 MILLIGRAM(S): 20 TABLET, DELAYED RELEASE ORAL at 06:00

## 2023-01-01 RX ADMIN — LIDOCAINE 3 PATCH: 4 CREAM TOPICAL at 22:27

## 2023-01-01 RX ADMIN — INSULIN GLARGINE 10 UNIT(S): 100 INJECTION, SOLUTION SUBCUTANEOUS at 22:26

## 2023-01-01 RX ADMIN — Medication 3: at 12:44

## 2023-01-01 RX ADMIN — TAMSULOSIN HYDROCHLORIDE 0.4 MILLIGRAM(S): 0.4 CAPSULE ORAL at 21:43

## 2023-01-01 RX ADMIN — Medication 975 MILLIGRAM(S): at 06:44

## 2023-01-01 RX ADMIN — Medication 1 MILLIGRAM(S): at 21:42

## 2023-01-01 RX ADMIN — LIDOCAINE 3 PATCH: 4 CREAM TOPICAL at 07:00

## 2023-01-01 RX ADMIN — Medication 1 MILLIGRAM(S): at 11:46

## 2023-01-01 RX ADMIN — Medication 500 MILLIGRAM(S): at 11:06

## 2023-01-01 RX ADMIN — Medication 2: at 17:24

## 2023-01-01 RX ADMIN — INSULIN GLARGINE 10 UNIT(S): 100 INJECTION, SOLUTION SUBCUTANEOUS at 23:17

## 2023-01-01 RX ADMIN — Medication 1 MILLIGRAM(S): at 12:17

## 2023-01-01 RX ADMIN — Medication 500 MILLIGRAM(S): at 17:18

## 2023-01-01 RX ADMIN — MYCOPHENOLATE MOFETIL 500 MILLIGRAM(S): 250 CAPSULE ORAL at 17:09

## 2023-01-01 RX ADMIN — INSULIN GLARGINE 26 UNIT(S): 100 INJECTION, SOLUTION SUBCUTANEOUS at 22:10

## 2023-01-01 RX ADMIN — MYCOPHENOLATE MOFETIL 500 MILLIGRAM(S): 250 CAPSULE ORAL at 17:25

## 2023-01-01 RX ADMIN — Medication 975 MILLIGRAM(S): at 06:02

## 2023-01-01 RX ADMIN — LIDOCAINE 1 PATCH: 4 CREAM TOPICAL at 12:02

## 2023-01-01 RX ADMIN — PANTOPRAZOLE SODIUM 40 MILLIGRAM(S): 20 TABLET, DELAYED RELEASE ORAL at 06:13

## 2023-01-01 RX ADMIN — HEPARIN SODIUM 5000 UNIT(S): 5000 INJECTION INTRAVENOUS; SUBCUTANEOUS at 22:30

## 2023-01-01 RX ADMIN — Medication 975 MILLIGRAM(S): at 22:30

## 2023-01-01 RX ADMIN — Medication 25 MILLIGRAM(S): at 01:25

## 2023-01-01 RX ADMIN — MYCOPHENOLATE MOFETIL 500 MILLIGRAM(S): 250 CAPSULE ORAL at 05:05

## 2023-01-01 RX ADMIN — ERTAPENEM SODIUM 120 MILLIGRAM(S): 1 INJECTION, POWDER, LYOPHILIZED, FOR SOLUTION INTRAMUSCULAR; INTRAVENOUS at 18:40

## 2023-01-01 RX ADMIN — Medication 10 MILLIGRAM(S): at 05:53

## 2023-01-01 RX ADMIN — HEPARIN SODIUM 5000 UNIT(S): 5000 INJECTION INTRAVENOUS; SUBCUTANEOUS at 22:16

## 2023-01-01 RX ADMIN — Medication 20 MILLIGRAM(S): at 05:39

## 2023-01-01 RX ADMIN — LIDOCAINE 1 PATCH: 4 CREAM TOPICAL at 21:56

## 2023-01-01 RX ADMIN — Medication 975 MILLIGRAM(S): at 21:34

## 2023-01-01 RX ADMIN — METHOCARBAMOL 750 MILLIGRAM(S): 500 TABLET, FILM COATED ORAL at 22:35

## 2023-01-01 RX ADMIN — Medication 975 MILLIGRAM(S): at 13:02

## 2023-01-01 RX ADMIN — MYCOPHENOLATE MOFETIL 500 MILLIGRAM(S): 250 CAPSULE ORAL at 18:48

## 2023-01-01 RX ADMIN — Medication 650 MILLIGRAM(S): at 21:12

## 2023-01-01 RX ADMIN — Medication 975 MILLIGRAM(S): at 22:01

## 2023-01-01 RX ADMIN — LIDOCAINE 3 PATCH: 4 CREAM TOPICAL at 07:05

## 2023-01-01 RX ADMIN — Medication 100 MEQ/KG/HR: at 05:38

## 2023-01-01 RX ADMIN — Medication 10 MILLIGRAM(S): at 06:02

## 2023-01-01 RX ADMIN — Medication 3 MILLIGRAM(S): at 22:40

## 2023-01-01 RX ADMIN — MYCOPHENOLATE MOFETIL 500 MILLIGRAM(S): 250 CAPSULE ORAL at 06:02

## 2023-01-01 RX ADMIN — Medication 3: at 12:55

## 2023-01-01 RX ADMIN — HEPARIN SODIUM 5000 UNIT(S): 5000 INJECTION INTRAVENOUS; SUBCUTANEOUS at 05:36

## 2023-01-01 RX ADMIN — PANTOPRAZOLE SODIUM 40 MILLIGRAM(S): 20 TABLET, DELAYED RELEASE ORAL at 06:03

## 2023-01-01 RX ADMIN — PANTOPRAZOLE SODIUM 40 MILLIGRAM(S): 20 TABLET, DELAYED RELEASE ORAL at 05:32

## 2023-01-01 RX ADMIN — HEPARIN SODIUM 5000 UNIT(S): 5000 INJECTION INTRAVENOUS; SUBCUTANEOUS at 06:29

## 2023-01-01 RX ADMIN — HEPARIN SODIUM 5000 UNIT(S): 5000 INJECTION INTRAVENOUS; SUBCUTANEOUS at 21:40

## 2023-01-01 RX ADMIN — Medication 300 MILLIGRAM(S): at 01:22

## 2023-01-01 RX ADMIN — LIDOCAINE 3 PATCH: 4 CREAM TOPICAL at 19:00

## 2023-01-01 RX ADMIN — Medication 1: at 18:17

## 2023-01-01 RX ADMIN — MYCOPHENOLATE MOFETIL 500 MILLIGRAM(S): 250 CAPSULE ORAL at 07:00

## 2023-01-01 RX ADMIN — MYCOPHENOLATE MOFETIL 500 MILLIGRAM(S): 250 CAPSULE ORAL at 18:05

## 2023-01-01 RX ADMIN — INSULIN GLARGINE 10 UNIT(S): 100 INJECTION, SOLUTION SUBCUTANEOUS at 21:58

## 2023-01-01 RX ADMIN — LIDOCAINE 1 PATCH: 4 CREAM TOPICAL at 21:46

## 2023-01-01 RX ADMIN — Medication 650 MILLIGRAM(S): at 00:10

## 2023-01-01 RX ADMIN — Medication 2 UNIT(S): at 17:36

## 2023-01-01 RX ADMIN — Medication 975 MILLIGRAM(S): at 09:47

## 2023-01-01 RX ADMIN — HEPARIN SODIUM 5000 UNIT(S): 5000 INJECTION INTRAVENOUS; SUBCUTANEOUS at 13:34

## 2023-01-01 RX ADMIN — MYCOPHENOLATE MOFETIL 500 MILLIGRAM(S): 250 CAPSULE ORAL at 05:24

## 2023-01-01 RX ADMIN — PIPERACILLIN AND TAZOBACTAM 25 GRAM(S): 4; .5 INJECTION, POWDER, LYOPHILIZED, FOR SOLUTION INTRAVENOUS at 21:24

## 2023-01-01 RX ADMIN — LIDOCAINE 3 PATCH: 4 CREAM TOPICAL at 02:30

## 2023-01-01 RX ADMIN — VALGANCICLOVIR 450 MILLIGRAM(S): 450 TABLET, FILM COATED ORAL at 12:20

## 2023-01-01 RX ADMIN — SODIUM CHLORIDE 250 MILLILITER(S): 9 INJECTION INTRAMUSCULAR; INTRAVENOUS; SUBCUTANEOUS at 22:40

## 2023-01-01 RX ADMIN — HYDROMORPHONE HYDROCHLORIDE 2 MILLIGRAM(S): 2 INJECTION INTRAMUSCULAR; INTRAVENOUS; SUBCUTANEOUS at 14:02

## 2023-01-01 RX ADMIN — Medication 500 MILLIGRAM(S): at 16:43

## 2023-01-01 RX ADMIN — CEFTRIAXONE 100 MILLIGRAM(S): 500 INJECTION, POWDER, FOR SOLUTION INTRAMUSCULAR; INTRAVENOUS at 17:53

## 2023-01-01 RX ADMIN — Medication 1: at 17:38

## 2023-01-01 RX ADMIN — INSULIN GLARGINE 10 UNIT(S): 100 INJECTION, SOLUTION SUBCUTANEOUS at 22:37

## 2023-01-01 RX ADMIN — TAMSULOSIN HYDROCHLORIDE 0.4 MILLIGRAM(S): 0.4 CAPSULE ORAL at 21:57

## 2023-01-01 RX ADMIN — Medication 300 MILLIGRAM(S): at 18:09

## 2023-01-01 RX ADMIN — Medication 8 UNIT(S): at 09:33

## 2023-01-01 RX ADMIN — Medication 2: at 08:24

## 2023-01-01 RX ADMIN — Medication 20 MILLIGRAM(S): at 05:27

## 2023-01-01 RX ADMIN — INSULIN GLARGINE 10 UNIT(S): 100 INJECTION, SOLUTION SUBCUTANEOUS at 22:44

## 2023-01-01 RX ADMIN — Medication 975 MILLIGRAM(S): at 07:41

## 2023-01-01 RX ADMIN — MYCOPHENOLATE MOFETIL 500 MILLIGRAM(S): 250 CAPSULE ORAL at 17:17

## 2023-01-01 RX ADMIN — QUETIAPINE FUMARATE 25 MILLIGRAM(S): 200 TABLET, FILM COATED ORAL at 21:59

## 2023-01-01 RX ADMIN — Medication 3 MILLIGRAM(S): at 21:39

## 2023-01-01 RX ADMIN — Medication 50 MILLILITER(S): at 09:08

## 2023-01-01 RX ADMIN — Medication 975 MILLIGRAM(S): at 06:14

## 2023-01-01 RX ADMIN — Medication 975 MILLIGRAM(S): at 21:56

## 2023-01-01 RX ADMIN — Medication 300 MILLIGRAM(S): at 01:05

## 2023-01-01 RX ADMIN — Medication 2 UNIT(S): at 18:01

## 2023-01-01 RX ADMIN — Medication 4: at 17:35

## 2023-01-01 RX ADMIN — HEPARIN SODIUM 5000 UNIT(S): 5000 INJECTION INTRAVENOUS; SUBCUTANEOUS at 13:18

## 2023-01-01 RX ADMIN — LIDOCAINE 3 PATCH: 4 CREAM TOPICAL at 13:18

## 2023-01-01 RX ADMIN — VALGANCICLOVIR 450 MILLIGRAM(S): 450 TABLET, FILM COATED ORAL at 13:16

## 2023-01-01 RX ADMIN — Medication 975 MILLIGRAM(S): at 22:16

## 2023-01-01 RX ADMIN — METHOCARBAMOL 750 MILLIGRAM(S): 500 TABLET, FILM COATED ORAL at 00:34

## 2023-01-01 RX ADMIN — Medication 500 MILLIGRAM(S): at 05:04

## 2023-01-01 RX ADMIN — METHOCARBAMOL 750 MILLIGRAM(S): 500 TABLET, FILM COATED ORAL at 09:48

## 2023-01-01 RX ADMIN — Medication 10 MILLIGRAM(S): at 06:41

## 2023-01-01 RX ADMIN — Medication 200 MILLIGRAM(S): at 17:07

## 2023-01-01 RX ADMIN — TAMSULOSIN HYDROCHLORIDE 0.4 MILLIGRAM(S): 0.4 CAPSULE ORAL at 22:20

## 2023-01-01 RX ADMIN — Medication 1 MILLIGRAM(S): at 13:07

## 2023-01-01 RX ADMIN — CHLORHEXIDINE GLUCONATE 1 APPLICATION(S): 213 SOLUTION TOPICAL at 12:53

## 2023-01-01 RX ADMIN — Medication 975 MILLIGRAM(S): at 22:07

## 2023-01-01 RX ADMIN — TAMSULOSIN HYDROCHLORIDE 0.4 MILLIGRAM(S): 0.4 CAPSULE ORAL at 22:15

## 2023-01-01 RX ADMIN — HEPARIN SODIUM 5000 UNIT(S): 5000 INJECTION INTRAVENOUS; SUBCUTANEOUS at 05:27

## 2023-01-01 RX ADMIN — Medication 2: at 09:58

## 2023-01-01 RX ADMIN — CHLORHEXIDINE GLUCONATE 1 APPLICATION(S): 213 SOLUTION TOPICAL at 12:20

## 2023-01-01 RX ADMIN — Medication 1 TABLET(S): at 11:05

## 2023-01-01 RX ADMIN — Medication 20 MILLIGRAM(S): at 05:24

## 2023-01-01 RX ADMIN — HEPARIN SODIUM 5000 UNIT(S): 5000 INJECTION INTRAVENOUS; SUBCUTANEOUS at 21:58

## 2023-01-01 RX ADMIN — HEPARIN SODIUM 5000 UNIT(S): 5000 INJECTION INTRAVENOUS; SUBCUTANEOUS at 16:48

## 2023-01-01 RX ADMIN — LIDOCAINE 1 PATCH: 4 CREAM TOPICAL at 19:32

## 2023-01-01 RX ADMIN — HEPARIN SODIUM 5000 UNIT(S): 5000 INJECTION INTRAVENOUS; SUBCUTANEOUS at 13:26

## 2023-01-01 RX ADMIN — INSULIN GLARGINE 10 UNIT(S): 100 INJECTION, SOLUTION SUBCUTANEOUS at 22:40

## 2023-01-01 RX ADMIN — HEPARIN SODIUM 5000 UNIT(S): 5000 INJECTION INTRAVENOUS; SUBCUTANEOUS at 05:34

## 2023-01-01 RX ADMIN — Medication 975 MILLIGRAM(S): at 13:52

## 2023-01-01 RX ADMIN — Medication 8 UNIT(S): at 08:52

## 2023-01-01 RX ADMIN — Medication 1: at 09:33

## 2023-01-01 RX ADMIN — Medication 8 UNIT(S): at 12:45

## 2023-01-01 RX ADMIN — Medication 975 MILLIGRAM(S): at 13:28

## 2023-01-01 RX ADMIN — HEPARIN SODIUM 5000 UNIT(S): 5000 INJECTION INTRAVENOUS; SUBCUTANEOUS at 21:31

## 2023-01-01 RX ADMIN — HEPARIN SODIUM 5000 UNIT(S): 5000 INJECTION INTRAVENOUS; SUBCUTANEOUS at 13:38

## 2023-01-01 RX ADMIN — Medication 25 MILLIGRAM(S): at 09:48

## 2023-01-01 RX ADMIN — Medication 1: at 21:48

## 2023-01-01 RX ADMIN — VALGANCICLOVIR 450 MILLIGRAM(S): 450 TABLET, FILM COATED ORAL at 17:25

## 2023-01-01 RX ADMIN — HEPARIN SODIUM 5000 UNIT(S): 5000 INJECTION INTRAVENOUS; SUBCUTANEOUS at 21:13

## 2023-01-01 RX ADMIN — Medication 2: at 21:27

## 2023-01-01 RX ADMIN — Medication 975 MILLIGRAM(S): at 21:37

## 2023-01-01 RX ADMIN — Medication 975 MILLIGRAM(S): at 21:41

## 2023-01-01 RX ADMIN — METHOCARBAMOL 750 MILLIGRAM(S): 500 TABLET, FILM COATED ORAL at 18:29

## 2023-01-01 RX ADMIN — CHLORHEXIDINE GLUCONATE 1 APPLICATION(S): 213 SOLUTION TOPICAL at 16:41

## 2023-01-01 RX ADMIN — LIDOCAINE 3 PATCH: 4 CREAM TOPICAL at 13:28

## 2023-01-01 RX ADMIN — MYCOPHENOLATE MOFETIL 500 MILLIGRAM(S): 250 CAPSULE ORAL at 20:17

## 2023-01-01 RX ADMIN — HEPARIN SODIUM 5000 UNIT(S): 5000 INJECTION INTRAVENOUS; SUBCUTANEOUS at 21:35

## 2023-01-01 RX ADMIN — Medication 1 UNIT(S): at 17:09

## 2023-01-01 RX ADMIN — Medication 975 MILLIGRAM(S): at 06:12

## 2023-01-01 RX ADMIN — MYCOPHENOLATE MOFETIL 500 MILLIGRAM(S): 250 CAPSULE ORAL at 17:14

## 2023-01-01 RX ADMIN — Medication 80 MILLIGRAM(S): at 17:17

## 2023-01-01 RX ADMIN — Medication 4 UNIT(S): at 16:53

## 2023-01-01 RX ADMIN — METHOCARBAMOL 750 MILLIGRAM(S): 500 TABLET, FILM COATED ORAL at 05:36

## 2023-01-01 RX ADMIN — Medication 6: at 08:08

## 2023-01-01 RX ADMIN — MYCOPHENOLATE MOFETIL 500 MILLIGRAM(S): 250 CAPSULE ORAL at 06:40

## 2023-01-01 RX ADMIN — HEPARIN SODIUM 5000 UNIT(S): 5000 INJECTION INTRAVENOUS; SUBCUTANEOUS at 15:30

## 2023-01-01 RX ADMIN — Medication 975 MILLIGRAM(S): at 05:31

## 2023-01-01 RX ADMIN — Medication 8: at 17:00

## 2023-01-01 RX ADMIN — MYCOPHENOLATE MOFETIL 500 MILLIGRAM(S): 250 CAPSULE ORAL at 05:23

## 2023-01-01 RX ADMIN — Medication 975 MILLIGRAM(S): at 00:06

## 2023-01-01 RX ADMIN — PIPERACILLIN AND TAZOBACTAM 25 GRAM(S): 4; .5 INJECTION, POWDER, LYOPHILIZED, FOR SOLUTION INTRAVENOUS at 13:35

## 2023-01-01 RX ADMIN — Medication 10 MILLIGRAM(S): at 05:58

## 2023-01-01 RX ADMIN — HEPARIN SODIUM 5000 UNIT(S): 5000 INJECTION INTRAVENOUS; SUBCUTANEOUS at 01:26

## 2023-01-01 RX ADMIN — Medication 166.67 MILLIGRAM(S): at 01:39

## 2023-01-01 RX ADMIN — Medication 975 MILLIGRAM(S): at 06:06

## 2023-01-01 RX ADMIN — HEPARIN SODIUM 5000 UNIT(S): 5000 INJECTION INTRAVENOUS; SUBCUTANEOUS at 13:00

## 2023-01-01 RX ADMIN — Medication 0.5 MILLIGRAM(S): at 13:49

## 2023-01-01 RX ADMIN — QUETIAPINE FUMARATE 25 MILLIGRAM(S): 200 TABLET, FILM COATED ORAL at 23:19

## 2023-01-01 RX ADMIN — HEPARIN SODIUM 5000 UNIT(S): 5000 INJECTION INTRAVENOUS; SUBCUTANEOUS at 11:06

## 2023-01-01 RX ADMIN — Medication 1 MILLIGRAM(S): at 11:52

## 2023-01-01 RX ADMIN — TAMSULOSIN HYDROCHLORIDE 0.4 MILLIGRAM(S): 0.4 CAPSULE ORAL at 21:13

## 2023-01-01 RX ADMIN — QUETIAPINE FUMARATE 25 MILLIGRAM(S): 200 TABLET, FILM COATED ORAL at 21:35

## 2023-01-01 RX ADMIN — LIDOCAINE 3 PATCH: 4 CREAM TOPICAL at 10:40

## 2023-01-01 RX ADMIN — HEPARIN SODIUM 5000 UNIT(S): 5000 INJECTION INTRAVENOUS; SUBCUTANEOUS at 23:43

## 2023-01-01 RX ADMIN — Medication 20 MILLIGRAM(S): at 05:01

## 2023-01-01 RX ADMIN — LIDOCAINE 1 PATCH: 4 CREAM TOPICAL at 10:00

## 2023-01-01 RX ADMIN — HEPARIN SODIUM 5000 UNIT(S): 5000 INJECTION INTRAVENOUS; SUBCUTANEOUS at 13:09

## 2023-01-01 RX ADMIN — MYCOPHENOLATE MOFETIL 500 MILLIGRAM(S): 250 CAPSULE ORAL at 17:33

## 2023-01-01 RX ADMIN — Medication 105 MILLIGRAM(S): at 22:10

## 2023-01-01 RX ADMIN — LIDOCAINE 3 PATCH: 4 CREAM TOPICAL at 12:33

## 2023-01-01 RX ADMIN — Medication 20 MILLIGRAM(S): at 12:01

## 2023-01-01 RX ADMIN — Medication 10 MILLIGRAM(S): at 06:19

## 2023-01-01 RX ADMIN — Medication 1 MILLIGRAM(S): at 11:27

## 2023-01-01 RX ADMIN — Medication 3 MILLIGRAM(S): at 22:00

## 2023-01-01 RX ADMIN — Medication 4: at 17:08

## 2023-01-01 RX ADMIN — INSULIN GLARGINE 10 UNIT(S): 100 INJECTION, SOLUTION SUBCUTANEOUS at 23:35

## 2023-01-01 RX ADMIN — HEPARIN SODIUM 5000 UNIT(S): 5000 INJECTION INTRAVENOUS; SUBCUTANEOUS at 13:35

## 2023-01-01 RX ADMIN — HEPARIN SODIUM 5000 UNIT(S): 5000 INJECTION INTRAVENOUS; SUBCUTANEOUS at 22:02

## 2023-01-01 RX ADMIN — MYCOPHENOLATE MOFETIL 500 MILLIGRAM(S): 250 CAPSULE ORAL at 06:14

## 2023-01-01 RX ADMIN — PANTOPRAZOLE SODIUM 40 MILLIGRAM(S): 20 TABLET, DELAYED RELEASE ORAL at 08:32

## 2023-01-01 RX ADMIN — Medication 3 MILLIGRAM(S): at 22:52

## 2023-01-01 RX ADMIN — TAMSULOSIN HYDROCHLORIDE 0.4 MILLIGRAM(S): 0.4 CAPSULE ORAL at 22:40

## 2023-01-01 RX ADMIN — SODIUM CHLORIDE 1000 MILLILITER(S): 9 INJECTION INTRAMUSCULAR; INTRAVENOUS; SUBCUTANEOUS at 17:17

## 2023-01-01 RX ADMIN — HEPARIN SODIUM 5000 UNIT(S): 5000 INJECTION INTRAVENOUS; SUBCUTANEOUS at 21:22

## 2023-01-01 RX ADMIN — VALGANCICLOVIR 450 MILLIGRAM(S): 450 TABLET, FILM COATED ORAL at 13:03

## 2023-01-01 RX ADMIN — CHLORHEXIDINE GLUCONATE 1 APPLICATION(S): 213 SOLUTION TOPICAL at 11:39

## 2023-01-01 RX ADMIN — MYCOPHENOLATE MOFETIL 500 MILLIGRAM(S): 250 CAPSULE ORAL at 18:15

## 2023-01-01 RX ADMIN — LIDOCAINE 1 PATCH: 4 CREAM TOPICAL at 00:24

## 2023-01-01 RX ADMIN — HEPARIN SODIUM 5000 UNIT(S): 5000 INJECTION INTRAVENOUS; SUBCUTANEOUS at 06:45

## 2023-01-01 RX ADMIN — MYCOPHENOLATE MOFETIL 500 MILLIGRAM(S): 250 CAPSULE ORAL at 05:46

## 2023-01-01 RX ADMIN — PANTOPRAZOLE SODIUM 40 MILLIGRAM(S): 20 TABLET, DELAYED RELEASE ORAL at 06:42

## 2023-01-01 RX ADMIN — Medication 6: at 17:13

## 2023-01-01 RX ADMIN — Medication 8 UNIT(S): at 17:00

## 2023-01-01 RX ADMIN — Medication 975 MILLIGRAM(S): at 05:15

## 2023-01-01 RX ADMIN — Medication 975 MILLIGRAM(S): at 22:04

## 2023-01-01 RX ADMIN — HEPARIN SODIUM 5000 UNIT(S): 5000 INJECTION INTRAVENOUS; SUBCUTANEOUS at 13:33

## 2023-01-01 RX ADMIN — CHLORHEXIDINE GLUCONATE 1 APPLICATION(S): 213 SOLUTION TOPICAL at 12:16

## 2023-01-01 RX ADMIN — MYCOPHENOLATE MOFETIL 500 MILLIGRAM(S): 250 CAPSULE ORAL at 05:41

## 2023-01-01 RX ADMIN — Medication 1 MILLIGRAM(S): at 12:28

## 2023-01-01 RX ADMIN — Medication 975 MILLIGRAM(S): at 13:01

## 2023-01-01 RX ADMIN — Medication 1 MILLIGRAM(S): at 12:43

## 2023-01-01 RX ADMIN — HEPARIN SODIUM 5000 UNIT(S): 5000 INJECTION INTRAVENOUS; SUBCUTANEOUS at 06:18

## 2023-01-01 RX ADMIN — HEPARIN SODIUM 5000 UNIT(S): 5000 INJECTION INTRAVENOUS; SUBCUTANEOUS at 14:42

## 2023-01-01 RX ADMIN — Medication 1 MILLIGRAM(S): at 13:03

## 2023-01-01 RX ADMIN — Medication 975 MILLIGRAM(S): at 22:27

## 2023-01-01 RX ADMIN — LACTULOSE 10 GRAM(S): 10 SOLUTION ORAL at 05:53

## 2023-01-01 RX ADMIN — LIDOCAINE 3 PATCH: 4 CREAM TOPICAL at 10:30

## 2023-01-01 RX ADMIN — INSULIN GLARGINE 10 UNIT(S): 100 INJECTION, SOLUTION SUBCUTANEOUS at 22:49

## 2023-01-01 RX ADMIN — Medication 975 MILLIGRAM(S): at 05:35

## 2023-01-01 RX ADMIN — Medication 3 MILLIGRAM(S): at 00:20

## 2023-01-01 RX ADMIN — INSULIN GLARGINE 14 UNIT(S): 100 INJECTION, SOLUTION SUBCUTANEOUS at 22:16

## 2023-01-01 RX ADMIN — MYCOPHENOLATE MOFETIL 500 MILLIGRAM(S): 250 CAPSULE ORAL at 05:57

## 2023-01-01 RX ADMIN — VALGANCICLOVIR 450 MILLIGRAM(S): 450 TABLET, FILM COATED ORAL at 11:54

## 2023-01-01 RX ADMIN — Medication 975 MILLIGRAM(S): at 14:26

## 2023-01-01 RX ADMIN — Medication 975 MILLIGRAM(S): at 22:45

## 2023-01-01 RX ADMIN — Medication 105 MILLIGRAM(S): at 13:32

## 2023-01-01 RX ADMIN — CHLORHEXIDINE GLUCONATE 1 APPLICATION(S): 213 SOLUTION TOPICAL at 13:18

## 2023-01-01 RX ADMIN — DICLOFENAC SODIUM 4 GRAM(S): 30 GEL TOPICAL at 17:09

## 2023-01-01 RX ADMIN — Medication 650 MILLIGRAM(S): at 06:40

## 2023-01-01 RX ADMIN — Medication 1 MILLIGRAM(S): at 13:16

## 2023-01-01 RX ADMIN — PANTOPRAZOLE SODIUM 40 MILLIGRAM(S): 20 TABLET, DELAYED RELEASE ORAL at 05:39

## 2023-01-01 RX ADMIN — PANTOPRAZOLE SODIUM 40 MILLIGRAM(S): 20 TABLET, DELAYED RELEASE ORAL at 06:29

## 2023-01-01 RX ADMIN — Medication 0.5 MILLIGRAM(S): at 19:39

## 2023-01-01 RX ADMIN — Medication 10 MILLIGRAM(S): at 05:41

## 2023-01-01 RX ADMIN — Medication 975 MILLIGRAM(S): at 05:57

## 2023-01-01 RX ADMIN — SERTRALINE 100 MILLIGRAM(S): 25 TABLET, FILM COATED ORAL at 13:17

## 2023-01-01 RX ADMIN — LIDOCAINE 3 PATCH: 4 CREAM TOPICAL at 14:26

## 2023-01-01 RX ADMIN — Medication 975 MILLIGRAM(S): at 22:39

## 2023-01-01 RX ADMIN — LIDOCAINE 3 PATCH: 4 CREAM TOPICAL at 16:42

## 2023-01-01 RX ADMIN — HEPARIN SODIUM 5000 UNIT(S): 5000 INJECTION INTRAVENOUS; SUBCUTANEOUS at 16:41

## 2023-01-01 RX ADMIN — TAMSULOSIN HYDROCHLORIDE 0.4 MILLIGRAM(S): 0.4 CAPSULE ORAL at 21:47

## 2023-01-01 RX ADMIN — Medication 4: at 22:11

## 2023-01-01 RX ADMIN — Medication 975 MILLIGRAM(S): at 13:30

## 2023-01-01 RX ADMIN — METHOCARBAMOL 750 MILLIGRAM(S): 500 TABLET, FILM COATED ORAL at 14:30

## 2023-01-01 RX ADMIN — Medication 300 MILLIGRAM(S): at 01:21

## 2023-01-01 RX ADMIN — HEPARIN SODIUM 5000 UNIT(S): 5000 INJECTION INTRAVENOUS; SUBCUTANEOUS at 22:15

## 2023-01-01 RX ADMIN — METHOCARBAMOL 750 MILLIGRAM(S): 500 TABLET, FILM COATED ORAL at 06:13

## 2023-01-01 RX ADMIN — INSULIN GLARGINE 10 UNIT(S): 100 INJECTION, SOLUTION SUBCUTANEOUS at 22:20

## 2023-01-01 RX ADMIN — METHOCARBAMOL 750 MILLIGRAM(S): 500 TABLET, FILM COATED ORAL at 01:22

## 2023-01-01 RX ADMIN — PIPERACILLIN AND TAZOBACTAM 25 GRAM(S): 4; .5 INJECTION, POWDER, LYOPHILIZED, FOR SOLUTION INTRAVENOUS at 12:19

## 2023-01-01 RX ADMIN — SERTRALINE 100 MILLIGRAM(S): 25 TABLET, FILM COATED ORAL at 12:21

## 2023-01-01 RX ADMIN — Medication 6 UNIT(S): at 17:13

## 2023-01-01 RX ADMIN — Medication 20 MILLIGRAM(S): at 06:00

## 2023-01-01 RX ADMIN — Medication 975 MILLIGRAM(S): at 22:15

## 2023-01-01 RX ADMIN — CHLORHEXIDINE GLUCONATE 1 APPLICATION(S): 213 SOLUTION TOPICAL at 11:25

## 2023-01-01 RX ADMIN — Medication 1: at 13:16

## 2023-01-01 RX ADMIN — PIPERACILLIN AND TAZOBACTAM 25 GRAM(S): 4; .5 INJECTION, POWDER, LYOPHILIZED, FOR SOLUTION INTRAVENOUS at 05:25

## 2023-01-01 RX ADMIN — PANTOPRAZOLE SODIUM 40 MILLIGRAM(S): 20 TABLET, DELAYED RELEASE ORAL at 05:24

## 2023-01-01 RX ADMIN — LIDOCAINE 1 PATCH: 4 CREAM TOPICAL at 17:18

## 2023-01-01 RX ADMIN — CYCLOBENZAPRINE HYDROCHLORIDE 10 MILLIGRAM(S): 10 TABLET, FILM COATED ORAL at 17:17

## 2023-01-01 RX ADMIN — PIPERACILLIN AND TAZOBACTAM 25 GRAM(S): 4; .5 INJECTION, POWDER, LYOPHILIZED, FOR SOLUTION INTRAVENOUS at 15:54

## 2023-01-01 RX ADMIN — Medication 10 MILLIGRAM(S): at 05:38

## 2023-01-01 RX ADMIN — Medication 105 MILLIGRAM(S): at 13:27

## 2023-01-01 RX ADMIN — CHLORHEXIDINE GLUCONATE 1 APPLICATION(S): 213 SOLUTION TOPICAL at 12:44

## 2023-01-01 RX ADMIN — MYCOPHENOLATE MOFETIL 500 MILLIGRAM(S): 250 CAPSULE ORAL at 05:32

## 2023-01-01 RX ADMIN — HEPARIN SODIUM 5000 UNIT(S): 5000 INJECTION INTRAVENOUS; SUBCUTANEOUS at 15:55

## 2023-01-01 RX ADMIN — Medication 1: at 17:57

## 2023-01-01 RX ADMIN — ONDANSETRON 4 MILLIGRAM(S): 8 TABLET, FILM COATED ORAL at 12:59

## 2023-01-01 RX ADMIN — CEFTRIAXONE 100 MILLIGRAM(S): 500 INJECTION, POWDER, FOR SOLUTION INTRAMUSCULAR; INTRAVENOUS at 17:25

## 2023-01-01 RX ADMIN — INSULIN GLARGINE 10 UNIT(S): 100 INJECTION, SOLUTION SUBCUTANEOUS at 22:01

## 2023-01-01 RX ADMIN — Medication 975 MILLIGRAM(S): at 06:30

## 2023-01-01 RX ADMIN — Medication 8 UNIT(S): at 12:01

## 2023-01-01 RX ADMIN — Medication 1 MILLIGRAM(S): at 11:06

## 2023-01-01 RX ADMIN — MYCOPHENOLATE MOFETIL 500 MILLIGRAM(S): 250 CAPSULE ORAL at 18:29

## 2023-01-01 RX ADMIN — VALGANCICLOVIR 450 MILLIGRAM(S): 450 TABLET, FILM COATED ORAL at 05:31

## 2023-01-01 RX ADMIN — Medication 300 MILLIGRAM(S): at 01:52

## 2023-01-01 RX ADMIN — HEPARIN SODIUM 5000 UNIT(S): 5000 INJECTION INTRAVENOUS; SUBCUTANEOUS at 06:00

## 2023-01-01 RX ADMIN — HEPARIN SODIUM 5000 UNIT(S): 5000 INJECTION INTRAVENOUS; SUBCUTANEOUS at 05:32

## 2023-01-01 RX ADMIN — Medication 4 UNIT(S): at 12:48

## 2023-01-01 RX ADMIN — Medication 105 MILLIGRAM(S): at 06:30

## 2023-01-01 RX ADMIN — HEPARIN SODIUM 5000 UNIT(S): 5000 INJECTION INTRAVENOUS; SUBCUTANEOUS at 13:28

## 2023-01-01 RX ADMIN — INSULIN GLARGINE 14 UNIT(S): 100 INJECTION, SOLUTION SUBCUTANEOUS at 22:30

## 2023-01-01 RX ADMIN — LIDOCAINE 1 PATCH: 4 CREAM TOPICAL at 12:15

## 2023-01-01 RX ADMIN — Medication 975 MILLIGRAM(S): at 13:33

## 2023-01-01 RX ADMIN — Medication 975 MILLIGRAM(S): at 16:42

## 2023-01-01 RX ADMIN — LACTULOSE 10 GRAM(S): 10 SOLUTION ORAL at 21:43

## 2023-01-01 RX ADMIN — MYCOPHENOLATE MOFETIL 500 MILLIGRAM(S): 250 CAPSULE ORAL at 17:57

## 2023-01-01 RX ADMIN — Medication 4: at 12:44

## 2023-01-01 RX ADMIN — INSULIN GLARGINE 20 UNIT(S): 100 INJECTION, SOLUTION SUBCUTANEOUS at 21:48

## 2023-01-01 RX ADMIN — MYCOPHENOLATE MOFETIL 500 MILLIGRAM(S): 250 CAPSULE ORAL at 05:31

## 2023-01-01 RX ADMIN — LIDOCAINE 1 PATCH: 4 CREAM TOPICAL at 00:15

## 2023-01-01 RX ADMIN — DICLOFENAC SODIUM 4 GRAM(S): 30 GEL TOPICAL at 05:01

## 2023-01-01 RX ADMIN — Medication 650 MILLIGRAM(S): at 18:06

## 2023-01-01 RX ADMIN — LIDOCAINE 3 PATCH: 4 CREAM TOPICAL at 01:11

## 2023-01-01 RX ADMIN — PANTOPRAZOLE SODIUM 40 MILLIGRAM(S): 20 TABLET, DELAYED RELEASE ORAL at 06:07

## 2023-01-01 RX ADMIN — INSULIN GLARGINE 10 UNIT(S): 100 INJECTION, SOLUTION SUBCUTANEOUS at 22:21

## 2023-01-01 RX ADMIN — INSULIN GLARGINE 14 UNIT(S): 100 INJECTION, SOLUTION SUBCUTANEOUS at 00:58

## 2023-01-01 RX ADMIN — Medication 975 MILLIGRAM(S): at 06:01

## 2023-01-01 RX ADMIN — Medication 975 MILLIGRAM(S): at 06:45

## 2023-01-01 RX ADMIN — Medication 975 MILLIGRAM(S): at 06:41

## 2023-01-01 RX ADMIN — PANTOPRAZOLE SODIUM 40 MILLIGRAM(S): 20 TABLET, DELAYED RELEASE ORAL at 06:11

## 2023-01-01 RX ADMIN — Medication 3: at 13:56

## 2023-01-01 RX ADMIN — HEPARIN SODIUM 5000 UNIT(S): 5000 INJECTION INTRAVENOUS; SUBCUTANEOUS at 21:25

## 2023-01-01 RX ADMIN — Medication 3 MILLIGRAM(S): at 21:32

## 2023-01-01 RX ADMIN — HALOPERIDOL DECANOATE 5 MILLIGRAM(S): 100 INJECTION INTRAMUSCULAR at 17:07

## 2023-01-01 RX ADMIN — Medication 250 MILLIGRAM(S): at 17:50

## 2023-01-01 RX ADMIN — Medication 650 MILLIGRAM(S): at 17:14

## 2023-01-01 RX ADMIN — Medication 8: at 22:30

## 2023-01-01 RX ADMIN — Medication 8 UNIT(S): at 12:27

## 2023-01-01 RX ADMIN — Medication 6: at 16:53

## 2023-01-01 RX ADMIN — TAMSULOSIN HYDROCHLORIDE 0.4 MILLIGRAM(S): 0.4 CAPSULE ORAL at 21:52

## 2023-01-01 RX ADMIN — Medication 10 MILLIGRAM(S): at 06:45

## 2023-01-01 RX ADMIN — Medication 3: at 18:06

## 2023-01-01 RX ADMIN — PANTOPRAZOLE SODIUM 40 MILLIGRAM(S): 20 TABLET, DELAYED RELEASE ORAL at 06:02

## 2023-01-01 RX ADMIN — PANTOPRAZOLE SODIUM 40 MILLIGRAM(S): 20 TABLET, DELAYED RELEASE ORAL at 06:01

## 2023-01-01 RX ADMIN — Medication 4: at 08:46

## 2023-01-01 RX ADMIN — Medication 975 MILLIGRAM(S): at 13:32

## 2023-01-01 RX ADMIN — Medication 3 MILLIGRAM(S): at 22:27

## 2023-01-01 RX ADMIN — Medication 2: at 08:58

## 2023-01-01 RX ADMIN — Medication 3: at 13:42

## 2023-01-01 RX ADMIN — Medication 105 MILLIGRAM(S): at 16:48

## 2023-01-01 RX ADMIN — HEPARIN SODIUM 5000 UNIT(S): 5000 INJECTION INTRAVENOUS; SUBCUTANEOUS at 22:20

## 2023-01-01 RX ADMIN — PANTOPRAZOLE SODIUM 40 MILLIGRAM(S): 20 TABLET, DELAYED RELEASE ORAL at 06:37

## 2023-01-01 RX ADMIN — MYCOPHENOLATE MOFETIL 500 MILLIGRAM(S): 250 CAPSULE ORAL at 18:39

## 2023-01-01 RX ADMIN — Medication 1 MILLIGRAM(S): at 12:42

## 2023-01-01 RX ADMIN — LIDOCAINE 3 PATCH: 4 CREAM TOPICAL at 13:05

## 2023-01-01 RX ADMIN — CHLORHEXIDINE GLUCONATE 1 APPLICATION(S): 213 SOLUTION TOPICAL at 13:08

## 2023-01-01 RX ADMIN — Medication 975 MILLIGRAM(S): at 05:42

## 2023-01-01 RX ADMIN — Medication 10 MILLIGRAM(S): at 09:12

## 2023-01-01 RX ADMIN — CHLORHEXIDINE GLUCONATE 1 APPLICATION(S): 213 SOLUTION TOPICAL at 11:59

## 2023-01-01 RX ADMIN — HEPARIN SODIUM 5000 UNIT(S): 5000 INJECTION INTRAVENOUS; SUBCUTANEOUS at 21:20

## 2023-01-01 RX ADMIN — HEPARIN SODIUM 5000 UNIT(S): 5000 INJECTION INTRAVENOUS; SUBCUTANEOUS at 06:02

## 2023-01-01 RX ADMIN — Medication 105 MILLIGRAM(S): at 21:42

## 2023-01-01 RX ADMIN — Medication 0.5 MILLIGRAM(S): at 18:40

## 2023-01-01 RX ADMIN — Medication 3: at 22:17

## 2023-01-01 RX ADMIN — Medication 1 PACKET(S): at 11:06

## 2023-01-01 RX ADMIN — Medication 15 MILLIGRAM(S): at 17:07

## 2023-01-01 RX ADMIN — INSULIN GLARGINE 14 UNIT(S): 100 INJECTION, SOLUTION SUBCUTANEOUS at 22:35

## 2023-01-01 RX ADMIN — TAMSULOSIN HYDROCHLORIDE 0.4 MILLIGRAM(S): 0.4 CAPSULE ORAL at 22:55

## 2023-01-01 RX ADMIN — HEPARIN SODIUM 5000 UNIT(S): 5000 INJECTION INTRAVENOUS; SUBCUTANEOUS at 05:52

## 2023-01-01 RX ADMIN — Medication 3 MILLIGRAM(S): at 22:16

## 2023-01-01 RX ADMIN — Medication 975 MILLIGRAM(S): at 14:25

## 2023-01-01 RX ADMIN — Medication 8 UNIT(S): at 12:50

## 2023-01-01 RX ADMIN — VALGANCICLOVIR 450 MILLIGRAM(S): 450 TABLET, FILM COATED ORAL at 11:27

## 2023-01-01 RX ADMIN — HYDROMORPHONE HYDROCHLORIDE 2 MILLIGRAM(S): 2 INJECTION INTRAMUSCULAR; INTRAVENOUS; SUBCUTANEOUS at 13:16

## 2023-01-01 RX ADMIN — SERTRALINE 100 MILLIGRAM(S): 25 TABLET, FILM COATED ORAL at 21:25

## 2023-01-01 RX ADMIN — VALGANCICLOVIR 450 MILLIGRAM(S): 450 TABLET, FILM COATED ORAL at 12:53

## 2023-01-01 RX ADMIN — QUETIAPINE FUMARATE 25 MILLIGRAM(S): 200 TABLET, FILM COATED ORAL at 22:52

## 2023-01-01 RX ADMIN — MYCOPHENOLATE MOFETIL 500 MILLIGRAM(S): 250 CAPSULE ORAL at 17:45

## 2023-01-01 RX ADMIN — HEPARIN SODIUM 5000 UNIT(S): 5000 INJECTION INTRAVENOUS; SUBCUTANEOUS at 14:34

## 2023-01-01 RX ADMIN — Medication 10 MILLIGRAM(S): at 05:45

## 2023-01-01 RX ADMIN — HEPARIN SODIUM 5000 UNIT(S): 5000 INJECTION INTRAVENOUS; SUBCUTANEOUS at 05:41

## 2023-01-01 RX ADMIN — INSULIN GLARGINE 14 UNIT(S): 100 INJECTION, SOLUTION SUBCUTANEOUS at 21:19

## 2023-01-01 RX ADMIN — DICLOFENAC SODIUM 4 GRAM(S): 30 GEL TOPICAL at 13:38

## 2023-01-01 RX ADMIN — Medication 1: at 08:58

## 2023-01-01 RX ADMIN — Medication 4: at 12:49

## 2023-01-01 RX ADMIN — LIDOCAINE 3 PATCH: 4 CREAM TOPICAL at 09:05

## 2023-01-01 RX ADMIN — Medication 1: at 22:01

## 2023-01-01 RX ADMIN — LACTULOSE 10 GRAM(S): 10 SOLUTION ORAL at 22:48

## 2023-01-01 RX ADMIN — MYCOPHENOLATE MOFETIL 500 MILLIGRAM(S): 250 CAPSULE ORAL at 06:29

## 2023-01-01 RX ADMIN — Medication 2 UNIT(S): at 18:07

## 2023-01-01 RX ADMIN — MYCOPHENOLATE MOFETIL 500 MILLIGRAM(S): 250 CAPSULE ORAL at 18:17

## 2023-01-01 RX ADMIN — Medication 4: at 08:23

## 2023-01-01 RX ADMIN — Medication 4: at 13:36

## 2023-01-01 RX ADMIN — VALGANCICLOVIR 450 MILLIGRAM(S): 450 TABLET, FILM COATED ORAL at 13:06

## 2023-01-01 RX ADMIN — HEPARIN SODIUM 5000 UNIT(S): 5000 INJECTION INTRAVENOUS; SUBCUTANEOUS at 22:35

## 2023-01-01 RX ADMIN — HEPARIN SODIUM 5000 UNIT(S): 5000 INJECTION INTRAVENOUS; SUBCUTANEOUS at 15:06

## 2023-01-01 RX ADMIN — LACTULOSE 10 GRAM(S): 10 SOLUTION ORAL at 13:34

## 2023-01-01 RX ADMIN — Medication 1 APPLICATION(S): at 12:18

## 2023-01-01 RX ADMIN — Medication 2: at 09:28

## 2023-01-01 RX ADMIN — PIPERACILLIN AND TAZOBACTAM 25 GRAM(S): 4; .5 INJECTION, POWDER, LYOPHILIZED, FOR SOLUTION INTRAVENOUS at 22:01

## 2023-01-01 RX ADMIN — TAMSULOSIN HYDROCHLORIDE 0.4 MILLIGRAM(S): 0.4 CAPSULE ORAL at 22:16

## 2023-01-01 RX ADMIN — QUETIAPINE FUMARATE 25 MILLIGRAM(S): 200 TABLET, FILM COATED ORAL at 21:39

## 2023-01-01 RX ADMIN — LIDOCAINE 1 PATCH: 4 CREAM TOPICAL at 12:33

## 2023-01-01 RX ADMIN — MYCOPHENOLATE MOFETIL 500 MILLIGRAM(S): 250 CAPSULE ORAL at 06:44

## 2023-01-01 RX ADMIN — Medication 1 TABLET(S): at 12:27

## 2023-01-01 RX ADMIN — MYCOPHENOLATE MOFETIL 500 MILLIGRAM(S): 250 CAPSULE ORAL at 05:38

## 2023-01-01 RX ADMIN — Medication 1 TABLET(S): at 12:33

## 2023-01-01 RX ADMIN — Medication 50 MILLILITER(S): at 14:54

## 2023-01-01 RX ADMIN — LACTULOSE 10 GRAM(S): 10 SOLUTION ORAL at 05:40

## 2023-01-01 RX ADMIN — LACTULOSE 10 GRAM(S): 10 SOLUTION ORAL at 06:28

## 2023-01-01 RX ADMIN — HEPARIN SODIUM 5000 UNIT(S): 5000 INJECTION INTRAVENOUS; SUBCUTANEOUS at 06:14

## 2023-01-01 RX ADMIN — CEFTRIAXONE 100 MILLIGRAM(S): 500 INJECTION, POWDER, FOR SOLUTION INTRAMUSCULAR; INTRAVENOUS at 17:17

## 2023-01-01 RX ADMIN — Medication 1: at 09:52

## 2023-01-01 RX ADMIN — Medication 3 MILLIGRAM(S): at 21:52

## 2023-01-01 RX ADMIN — Medication 4 UNIT(S): at 08:40

## 2023-01-01 RX ADMIN — Medication 500 MILLIGRAM(S): at 17:25

## 2023-01-01 RX ADMIN — FENTANYL CITRATE 25 MICROGRAM(S): 50 INJECTION INTRAVENOUS at 19:33

## 2023-01-01 RX ADMIN — INSULIN GLARGINE 14 UNIT(S): 100 INJECTION, SOLUTION SUBCUTANEOUS at 21:13

## 2023-01-01 RX ADMIN — Medication 975 MILLIGRAM(S): at 15:04

## 2023-01-01 RX ADMIN — Medication 500 MILLIGRAM(S): at 05:39

## 2023-01-01 RX ADMIN — HEPARIN SODIUM 5000 UNIT(S): 5000 INJECTION INTRAVENOUS; SUBCUTANEOUS at 05:04

## 2023-01-01 RX ADMIN — HEPARIN SODIUM 5000 UNIT(S): 5000 INJECTION INTRAVENOUS; SUBCUTANEOUS at 22:00

## 2023-01-01 RX ADMIN — Medication 2: at 21:42

## 2023-01-01 RX ADMIN — LIDOCAINE 3 PATCH: 4 CREAM TOPICAL at 18:46

## 2023-01-01 RX ADMIN — Medication 1 UNIT(S): at 12:15

## 2023-01-01 RX ADMIN — INSULIN GLARGINE 26 UNIT(S): 100 INJECTION, SOLUTION SUBCUTANEOUS at 21:42

## 2023-01-01 RX ADMIN — LACTULOSE 10 GRAM(S): 10 SOLUTION ORAL at 22:10

## 2023-01-01 RX ADMIN — MYCOPHENOLATE MOFETIL 500 MILLIGRAM(S): 250 CAPSULE ORAL at 05:27

## 2023-01-01 RX ADMIN — TAMSULOSIN HYDROCHLORIDE 0.4 MILLIGRAM(S): 0.4 CAPSULE ORAL at 21:59

## 2023-01-01 RX ADMIN — Medication 1: at 12:39

## 2023-01-01 RX ADMIN — Medication 975 MILLIGRAM(S): at 10:23

## 2023-01-01 RX ADMIN — Medication 1: at 11:51

## 2023-01-01 RX ADMIN — MYCOPHENOLATE MOFETIL 500 MILLIGRAM(S): 250 CAPSULE ORAL at 05:01

## 2023-01-01 RX ADMIN — PIPERACILLIN AND TAZOBACTAM 25 GRAM(S): 4; .5 INJECTION, POWDER, LYOPHILIZED, FOR SOLUTION INTRAVENOUS at 21:21

## 2023-01-01 RX ADMIN — HEPARIN SODIUM 5000 UNIT(S): 5000 INJECTION INTRAVENOUS; SUBCUTANEOUS at 15:16

## 2023-01-01 RX ADMIN — Medication 166.67 MILLIGRAM(S): at 23:08

## 2023-01-01 RX ADMIN — Medication 10 MILLIGRAM(S): at 05:16

## 2023-01-01 RX ADMIN — Medication 2: at 12:49

## 2023-01-01 RX ADMIN — SODIUM CHLORIDE 75 MILLILITER(S): 9 INJECTION, SOLUTION INTRAVENOUS at 18:32

## 2023-01-01 RX ADMIN — VALGANCICLOVIR 450 MILLIGRAM(S): 450 TABLET, FILM COATED ORAL at 11:39

## 2023-01-01 RX ADMIN — INSULIN GLARGINE 10 UNIT(S): 100 INJECTION, SOLUTION SUBCUTANEOUS at 22:46

## 2023-01-01 RX ADMIN — MYCOPHENOLATE MOFETIL 500 MILLIGRAM(S): 250 CAPSULE ORAL at 17:42

## 2023-01-01 RX ADMIN — Medication 25 GRAM(S): at 13:31

## 2023-01-01 RX ADMIN — Medication 975 MILLIGRAM(S): at 23:17

## 2023-01-01 RX ADMIN — Medication 1 MILLIGRAM(S): at 12:52

## 2023-01-01 RX ADMIN — Medication 10 MILLIGRAM(S): at 05:36

## 2023-01-01 RX ADMIN — Medication 975 MILLIGRAM(S): at 14:16

## 2023-01-01 RX ADMIN — Medication 975 MILLIGRAM(S): at 06:05

## 2023-01-01 RX ADMIN — HEPARIN SODIUM 5000 UNIT(S): 5000 INJECTION INTRAVENOUS; SUBCUTANEOUS at 13:01

## 2023-01-01 RX ADMIN — Medication 1 MILLIGRAM(S): at 12:33

## 2023-01-01 RX ADMIN — Medication 1 TABLET(S): at 13:33

## 2023-01-01 RX ADMIN — CHLORHEXIDINE GLUCONATE 1 APPLICATION(S): 213 SOLUTION TOPICAL at 07:32

## 2023-01-01 RX ADMIN — Medication 975 MILLIGRAM(S): at 13:17

## 2023-01-01 RX ADMIN — SERTRALINE 100 MILLIGRAM(S): 25 TABLET, FILM COATED ORAL at 11:52

## 2023-01-01 RX ADMIN — MYCOPHENOLATE MOFETIL 500 MILLIGRAM(S): 250 CAPSULE ORAL at 18:02

## 2023-01-01 RX ADMIN — Medication 975 MILLIGRAM(S): at 03:14

## 2023-01-01 RX ADMIN — LIDOCAINE 3 PATCH: 4 CREAM TOPICAL at 13:33

## 2023-01-01 RX ADMIN — LIDOCAINE 3 PATCH: 4 CREAM TOPICAL at 07:27

## 2023-01-01 RX ADMIN — TAMSULOSIN HYDROCHLORIDE 0.4 MILLIGRAM(S): 0.4 CAPSULE ORAL at 21:55

## 2023-01-01 RX ADMIN — Medication 4: at 08:36

## 2023-01-01 RX ADMIN — Medication 10 MILLIGRAM(S): at 05:35

## 2023-01-01 RX ADMIN — PANTOPRAZOLE SODIUM 40 MILLIGRAM(S): 20 TABLET, DELAYED RELEASE ORAL at 05:26

## 2023-01-01 RX ADMIN — Medication 1 MILLIGRAM(S): at 12:49

## 2023-01-01 RX ADMIN — ERTAPENEM SODIUM 120 MILLIGRAM(S): 1 INJECTION, POWDER, LYOPHILIZED, FOR SOLUTION INTRAMUSCULAR; INTRAVENOUS at 16:47

## 2023-01-01 RX ADMIN — Medication 975 MILLIGRAM(S): at 14:12

## 2023-01-01 RX ADMIN — Medication 3 MILLIGRAM(S): at 23:18

## 2023-01-01 RX ADMIN — Medication 6: at 08:50

## 2023-01-01 RX ADMIN — Medication 3: at 17:34

## 2023-01-01 RX ADMIN — Medication 1: at 13:17

## 2023-01-01 RX ADMIN — PANTOPRAZOLE SODIUM 40 MILLIGRAM(S): 20 TABLET, DELAYED RELEASE ORAL at 05:05

## 2023-01-01 RX ADMIN — LIDOCAINE 3 PATCH: 4 CREAM TOPICAL at 18:17

## 2023-01-01 RX ADMIN — LIDOCAINE 3 PATCH: 4 CREAM TOPICAL at 01:05

## 2023-01-01 RX ADMIN — CHLORHEXIDINE GLUCONATE 1 APPLICATION(S): 213 SOLUTION TOPICAL at 11:55

## 2023-01-01 RX ADMIN — Medication 1 MILLIGRAM(S): at 13:33

## 2023-01-01 RX ADMIN — VALGANCICLOVIR 450 MILLIGRAM(S): 450 TABLET, FILM COATED ORAL at 11:43

## 2023-01-01 RX ADMIN — MYCOPHENOLATE MOFETIL 500 MILLIGRAM(S): 250 CAPSULE ORAL at 11:06

## 2023-01-01 RX ADMIN — VALGANCICLOVIR 450 MILLIGRAM(S): 450 TABLET, FILM COATED ORAL at 14:17

## 2023-01-01 RX ADMIN — Medication 3: at 13:02

## 2023-01-01 RX ADMIN — Medication 4: at 12:01

## 2023-01-01 RX ADMIN — HEPARIN SODIUM 5000 UNIT(S): 5000 INJECTION INTRAVENOUS; SUBCUTANEOUS at 21:42

## 2023-01-01 RX ADMIN — LIDOCAINE 3 PATCH: 4 CREAM TOPICAL at 14:30

## 2023-01-01 RX ADMIN — SODIUM CHLORIDE 75 MILLILITER(S): 9 INJECTION, SOLUTION INTRAVENOUS at 14:42

## 2023-01-01 RX ADMIN — Medication 975 MILLIGRAM(S): at 21:59

## 2023-01-01 RX ADMIN — Medication 2: at 17:59

## 2023-01-01 RX ADMIN — SERTRALINE 100 MILLIGRAM(S): 25 TABLET, FILM COATED ORAL at 12:54

## 2023-01-01 RX ADMIN — Medication 1: at 22:39

## 2023-01-01 RX ADMIN — Medication 975 MILLIGRAM(S): at 22:35

## 2023-01-01 RX ADMIN — MYCOPHENOLATE MOFETIL 500 MILLIGRAM(S): 250 CAPSULE ORAL at 17:37

## 2023-01-01 RX ADMIN — Medication 975 MILLIGRAM(S): at 13:00

## 2023-01-01 RX ADMIN — TAMSULOSIN HYDROCHLORIDE 0.4 MILLIGRAM(S): 0.4 CAPSULE ORAL at 22:00

## 2023-01-01 RX ADMIN — SODIUM CHLORIDE 1000 MILLILITER(S): 9 INJECTION, SOLUTION INTRAVENOUS at 19:28

## 2023-01-01 RX ADMIN — Medication 975 MILLIGRAM(S): at 14:30

## 2023-01-01 RX ADMIN — INSULIN GLARGINE 10 UNIT(S): 100 INJECTION, SOLUTION SUBCUTANEOUS at 21:46

## 2023-01-01 RX ADMIN — Medication 975 MILLIGRAM(S): at 13:22

## 2023-01-01 RX ADMIN — TAMSULOSIN HYDROCHLORIDE 0.4 MILLIGRAM(S): 0.4 CAPSULE ORAL at 21:35

## 2023-01-01 RX ADMIN — Medication 105 MILLIGRAM(S): at 05:50

## 2023-01-01 RX ADMIN — Medication 975 MILLIGRAM(S): at 13:37

## 2023-01-01 RX ADMIN — Medication 975 MILLIGRAM(S): at 05:44

## 2023-01-01 RX ADMIN — HEPARIN SODIUM 5000 UNIT(S): 5000 INJECTION INTRAVENOUS; SUBCUTANEOUS at 22:46

## 2023-01-01 RX ADMIN — HEPARIN SODIUM 5000 UNIT(S): 5000 INJECTION INTRAVENOUS; SUBCUTANEOUS at 22:27

## 2023-01-01 RX ADMIN — CHLORHEXIDINE GLUCONATE 1 APPLICATION(S): 213 SOLUTION TOPICAL at 11:53

## 2023-01-01 RX ADMIN — CHLORHEXIDINE GLUCONATE 1 APPLICATION(S): 213 SOLUTION TOPICAL at 12:45

## 2023-01-01 RX ADMIN — PIPERACILLIN AND TAZOBACTAM 25 GRAM(S): 4; .5 INJECTION, POWDER, LYOPHILIZED, FOR SOLUTION INTRAVENOUS at 21:14

## 2023-01-01 RX ADMIN — Medication 3 MILLIGRAM(S): at 21:47

## 2023-01-01 RX ADMIN — Medication 1 PACKET(S): at 12:50

## 2023-01-01 RX ADMIN — Medication 975 MILLIGRAM(S): at 22:47

## 2023-01-01 RX ADMIN — Medication 1 MILLIGRAM(S): at 12:34

## 2023-01-01 RX ADMIN — MYCOPHENOLATE MOFETIL 500 MILLIGRAM(S): 250 CAPSULE ORAL at 17:53

## 2023-01-01 RX ADMIN — HEPARIN SODIUM 5000 UNIT(S): 5000 INJECTION INTRAVENOUS; SUBCUTANEOUS at 21:48

## 2023-01-01 RX ADMIN — HEPARIN SODIUM 5000 UNIT(S): 5000 INJECTION INTRAVENOUS; SUBCUTANEOUS at 09:50

## 2023-01-01 RX ADMIN — Medication 975 MILLIGRAM(S): at 06:29

## 2023-01-01 RX ADMIN — Medication 10 MILLIGRAM(S): at 06:13

## 2023-01-01 RX ADMIN — VALGANCICLOVIR 450 MILLIGRAM(S): 450 TABLET, FILM COATED ORAL at 11:52

## 2023-01-01 RX ADMIN — Medication 8 UNIT(S): at 16:44

## 2023-01-01 RX ADMIN — Medication 2 UNIT(S): at 12:59

## 2023-01-01 RX ADMIN — PANTOPRAZOLE SODIUM 40 MILLIGRAM(S): 20 TABLET, DELAYED RELEASE ORAL at 06:39

## 2023-01-01 RX ADMIN — Medication 975 MILLIGRAM(S): at 18:21

## 2023-01-01 RX ADMIN — Medication 975 MILLIGRAM(S): at 05:36

## 2023-01-01 RX ADMIN — LIDOCAINE 3 PATCH: 4 CREAM TOPICAL at 04:19

## 2023-01-01 RX ADMIN — LIDOCAINE 1 PATCH: 4 CREAM TOPICAL at 20:28

## 2023-01-01 RX ADMIN — Medication 20 MILLIGRAM(S): at 05:23

## 2023-01-01 RX ADMIN — Medication 1: at 06:40

## 2023-01-01 RX ADMIN — VALGANCICLOVIR 450 MILLIGRAM(S): 450 TABLET, FILM COATED ORAL at 12:43

## 2023-01-01 RX ADMIN — METHOCARBAMOL 750 MILLIGRAM(S): 500 TABLET, FILM COATED ORAL at 17:14

## 2023-01-01 RX ADMIN — INSULIN GLARGINE 14 UNIT(S): 100 INJECTION, SOLUTION SUBCUTANEOUS at 21:28

## 2023-01-01 RX ADMIN — MEROPENEM 100 MILLIGRAM(S): 1 INJECTION INTRAVENOUS at 05:01

## 2023-01-01 RX ADMIN — HEPARIN SODIUM 5000 UNIT(S): 5000 INJECTION INTRAVENOUS; SUBCUTANEOUS at 06:01

## 2023-01-01 RX ADMIN — CHLORHEXIDINE GLUCONATE 1 APPLICATION(S): 213 SOLUTION TOPICAL at 12:42

## 2023-01-01 RX ADMIN — ERTAPENEM SODIUM 120 MILLIGRAM(S): 1 INJECTION, POWDER, LYOPHILIZED, FOR SOLUTION INTRAMUSCULAR; INTRAVENOUS at 17:11

## 2023-01-01 RX ADMIN — Medication 2: at 22:49

## 2023-01-01 RX ADMIN — Medication 1 MILLIGRAM(S): at 12:02

## 2023-01-01 RX ADMIN — HEPARIN SODIUM 5000 UNIT(S): 5000 INJECTION INTRAVENOUS; SUBCUTANEOUS at 06:39

## 2023-01-01 RX ADMIN — PANTOPRAZOLE SODIUM 40 MILLIGRAM(S): 20 TABLET, DELAYED RELEASE ORAL at 05:02

## 2023-01-01 RX ADMIN — Medication 975 MILLIGRAM(S): at 06:49

## 2023-01-01 RX ADMIN — Medication 1: at 18:25

## 2023-01-01 RX ADMIN — Medication 975 MILLIGRAM(S): at 23:36

## 2023-01-01 RX ADMIN — TAMSULOSIN HYDROCHLORIDE 0.4 MILLIGRAM(S): 0.4 CAPSULE ORAL at 22:35

## 2023-01-01 RX ADMIN — Medication 2 UNIT(S): at 13:57

## 2023-01-01 RX ADMIN — SERTRALINE 100 MILLIGRAM(S): 25 TABLET, FILM COATED ORAL at 14:17

## 2023-01-01 RX ADMIN — TAMSULOSIN HYDROCHLORIDE 0.4 MILLIGRAM(S): 0.4 CAPSULE ORAL at 21:38

## 2023-01-01 RX ADMIN — Medication 10 MILLIGRAM(S): at 06:29

## 2023-01-01 RX ADMIN — LACTULOSE 10 GRAM(S): 10 SOLUTION ORAL at 13:28

## 2023-01-01 RX ADMIN — Medication 500 MILLIGRAM(S): at 17:20

## 2023-01-01 RX ADMIN — Medication 2: at 12:47

## 2023-01-01 RX ADMIN — MYCOPHENOLATE MOFETIL 500 MILLIGRAM(S): 250 CAPSULE ORAL at 17:20

## 2023-01-01 RX ADMIN — CEFTRIAXONE 100 MILLIGRAM(S): 500 INJECTION, POWDER, FOR SOLUTION INTRAMUSCULAR; INTRAVENOUS at 17:20

## 2023-01-01 RX ADMIN — LIDOCAINE 1 PATCH: 4 CREAM TOPICAL at 22:40

## 2023-01-01 RX ADMIN — LIDOCAINE 3 PATCH: 4 CREAM TOPICAL at 09:00

## 2023-01-01 RX ADMIN — Medication 500 MILLIGRAM(S): at 17:14

## 2023-01-01 RX ADMIN — Medication 2 UNIT(S): at 09:28

## 2023-01-01 RX ADMIN — LIDOCAINE 3 PATCH: 4 CREAM TOPICAL at 05:12

## 2023-01-01 RX ADMIN — HEPARIN SODIUM 5000 UNIT(S): 5000 INJECTION INTRAVENOUS; SUBCUTANEOUS at 21:03

## 2023-01-01 RX ADMIN — Medication 50 MILLIEQUIVALENT(S): at 18:26

## 2023-01-01 RX ADMIN — HEPARIN SODIUM 5000 UNIT(S): 5000 INJECTION INTRAVENOUS; SUBCUTANEOUS at 05:57

## 2023-01-01 RX ADMIN — PIPERACILLIN AND TAZOBACTAM 25 GRAM(S): 4; .5 INJECTION, POWDER, LYOPHILIZED, FOR SOLUTION INTRAVENOUS at 05:32

## 2023-01-01 RX ADMIN — Medication 4: at 17:59

## 2023-01-01 RX ADMIN — HEPARIN SODIUM 5000 UNIT(S): 5000 INJECTION INTRAVENOUS; SUBCUTANEOUS at 22:39

## 2023-01-01 RX ADMIN — Medication 4: at 18:00

## 2023-01-01 RX ADMIN — CEFTRIAXONE 100 MILLIGRAM(S): 500 INJECTION, POWDER, FOR SOLUTION INTRAMUSCULAR; INTRAVENOUS at 17:21

## 2023-01-01 RX ADMIN — INSULIN GLARGINE 18 UNIT(S): 100 INJECTION, SOLUTION SUBCUTANEOUS at 22:01

## 2023-01-01 RX ADMIN — HEPARIN SODIUM 5000 UNIT(S): 5000 INJECTION INTRAVENOUS; SUBCUTANEOUS at 21:53

## 2023-01-01 RX ADMIN — SERTRALINE 100 MILLIGRAM(S): 25 TABLET, FILM COATED ORAL at 13:06

## 2023-01-01 RX ADMIN — LIDOCAINE 3 PATCH: 4 CREAM TOPICAL at 01:18

## 2023-01-01 RX ADMIN — Medication 75 MEQ/KG/HR: at 20:20

## 2023-01-01 RX ADMIN — Medication 25 MILLIGRAM(S): at 00:21

## 2023-01-01 RX ADMIN — Medication 1: at 09:04

## 2023-01-01 RX ADMIN — INSULIN GLARGINE 10 UNIT(S): 100 INJECTION, SOLUTION SUBCUTANEOUS at 21:57

## 2023-01-01 RX ADMIN — VALGANCICLOVIR 450 MILLIGRAM(S): 450 TABLET, FILM COATED ORAL at 12:16

## 2023-01-01 RX ADMIN — MYCOPHENOLATE MOFETIL 500 MILLIGRAM(S): 250 CAPSULE ORAL at 19:57

## 2023-01-01 RX ADMIN — Medication 2: at 13:19

## 2023-01-01 RX ADMIN — TAMSULOSIN HYDROCHLORIDE 0.4 MILLIGRAM(S): 0.4 CAPSULE ORAL at 21:31

## 2023-01-01 RX ADMIN — HEPARIN SODIUM 5000 UNIT(S): 5000 INJECTION INTRAVENOUS; SUBCUTANEOUS at 05:24

## 2023-01-01 RX ADMIN — LIDOCAINE 1 PATCH: 4 CREAM TOPICAL at 06:26

## 2023-01-01 RX ADMIN — Medication 1 TABLET(S): at 12:49

## 2023-01-01 RX ADMIN — MYCOPHENOLATE MOFETIL 500 MILLIGRAM(S): 250 CAPSULE ORAL at 09:12

## 2023-01-01 RX ADMIN — METHOCARBAMOL 750 MILLIGRAM(S): 500 TABLET, FILM COATED ORAL at 23:36

## 2023-01-01 RX ADMIN — ERTAPENEM SODIUM 120 MILLIGRAM(S): 1 INJECTION, POWDER, LYOPHILIZED, FOR SOLUTION INTRAMUSCULAR; INTRAVENOUS at 16:53

## 2023-01-01 RX ADMIN — Medication 1 MILLIGRAM(S): at 11:58

## 2023-01-01 RX ADMIN — Medication 2: at 13:16

## 2023-01-01 RX ADMIN — ERTAPENEM SODIUM 120 MILLIGRAM(S): 1 INJECTION, POWDER, LYOPHILIZED, FOR SOLUTION INTRAMUSCULAR; INTRAVENOUS at 17:45

## 2023-01-01 RX ADMIN — LACTULOSE 10 GRAM(S): 10 SOLUTION ORAL at 22:15

## 2023-01-01 RX ADMIN — Medication 1 MILLIGRAM(S): at 12:51

## 2023-01-01 RX ADMIN — Medication 650 MILLIGRAM(S): at 06:00

## 2023-01-01 RX ADMIN — Medication 250 MILLIGRAM(S): at 13:38

## 2023-01-01 RX ADMIN — Medication 2: at 08:38

## 2023-01-01 RX ADMIN — Medication 2: at 12:15

## 2023-01-01 RX ADMIN — Medication 105 MILLIGRAM(S): at 22:53

## 2023-01-01 RX ADMIN — Medication 1000 MILLIGRAM(S): at 14:56

## 2023-01-01 RX ADMIN — Medication 25 MILLIGRAM(S): at 18:28

## 2023-01-01 RX ADMIN — ERTAPENEM SODIUM 120 MILLIGRAM(S): 1 INJECTION, POWDER, LYOPHILIZED, FOR SOLUTION INTRAMUSCULAR; INTRAVENOUS at 16:45

## 2023-01-01 RX ADMIN — Medication 975 MILLIGRAM(S): at 17:56

## 2023-01-01 RX ADMIN — Medication 1: at 22:27

## 2023-01-01 RX ADMIN — Medication 975 MILLIGRAM(S): at 01:26

## 2023-01-01 RX ADMIN — Medication 50 MILLILITER(S): at 23:01

## 2023-01-01 RX ADMIN — CHLORHEXIDINE GLUCONATE 1 APPLICATION(S): 213 SOLUTION TOPICAL at 11:46

## 2023-01-01 RX ADMIN — Medication 0.5 MILLIGRAM(S): at 20:33

## 2023-01-01 RX ADMIN — Medication 975 MILLIGRAM(S): at 05:41

## 2023-01-01 RX ADMIN — Medication 1 MILLIGRAM(S): at 12:21

## 2023-01-01 RX ADMIN — TAMSULOSIN HYDROCHLORIDE 0.4 MILLIGRAM(S): 0.4 CAPSULE ORAL at 22:52

## 2023-01-01 RX ADMIN — Medication 400 MILLIGRAM(S): at 17:15

## 2023-01-01 RX ADMIN — MYCOPHENOLATE MOFETIL 500 MILLIGRAM(S): 250 CAPSULE ORAL at 18:20

## 2023-01-01 RX ADMIN — CEFEPIME 100 MILLIGRAM(S): 1 INJECTION, POWDER, FOR SOLUTION INTRAMUSCULAR; INTRAVENOUS at 17:23

## 2023-01-01 RX ADMIN — Medication 975 MILLIGRAM(S): at 21:31

## 2023-01-01 RX ADMIN — Medication 8 UNIT(S): at 08:47

## 2023-01-01 RX ADMIN — CEFTRIAXONE 100 MILLIGRAM(S): 500 INJECTION, POWDER, FOR SOLUTION INTRAMUSCULAR; INTRAVENOUS at 23:58

## 2023-01-01 RX ADMIN — LIDOCAINE 3 PATCH: 4 CREAM TOPICAL at 01:33

## 2023-01-01 RX ADMIN — Medication 1 MILLIGRAM(S): at 12:15

## 2023-01-01 RX ADMIN — VALGANCICLOVIR 450 MILLIGRAM(S): 450 TABLET, FILM COATED ORAL at 11:59

## 2023-01-01 RX ADMIN — TAMSULOSIN HYDROCHLORIDE 0.4 MILLIGRAM(S): 0.4 CAPSULE ORAL at 22:27

## 2023-01-01 RX ADMIN — SODIUM CHLORIDE 500 MILLILITER(S): 9 INJECTION INTRAMUSCULAR; INTRAVENOUS; SUBCUTANEOUS at 03:14

## 2023-01-01 RX ADMIN — INSULIN GLARGINE 10 UNIT(S): 100 INJECTION, SOLUTION SUBCUTANEOUS at 21:47

## 2023-01-01 RX ADMIN — Medication 975 MILLIGRAM(S): at 01:11

## 2023-01-01 RX ADMIN — Medication 975 MILLIGRAM(S): at 05:52

## 2023-01-01 RX ADMIN — Medication 10 MILLIGRAM(S): at 05:32

## 2023-01-01 RX ADMIN — LIDOCAINE 1 PATCH: 4 CREAM TOPICAL at 19:44

## 2023-01-01 RX ADMIN — Medication 12 UNIT(S): at 16:44

## 2023-01-01 RX ADMIN — Medication 5: at 12:59

## 2023-01-01 RX ADMIN — Medication 1: at 22:13

## 2023-01-01 RX ADMIN — Medication 2: at 10:26

## 2023-01-01 RX ADMIN — PIPERACILLIN AND TAZOBACTAM 200 GRAM(S): 4; .5 INJECTION, POWDER, LYOPHILIZED, FOR SOLUTION INTRAVENOUS at 17:21

## 2023-01-01 RX ADMIN — SERTRALINE 100 MILLIGRAM(S): 25 TABLET, FILM COATED ORAL at 13:04

## 2023-01-01 RX ADMIN — MYCOPHENOLATE MOFETIL 500 MILLIGRAM(S): 250 CAPSULE ORAL at 05:35

## 2023-01-01 RX ADMIN — Medication 20 MILLIGRAM(S): at 05:04

## 2023-01-01 RX ADMIN — Medication 975 MILLIGRAM(S): at 13:31

## 2023-01-01 RX ADMIN — Medication 250 MILLIGRAM(S): at 01:06

## 2023-01-01 RX ADMIN — Medication 1: at 18:23

## 2023-01-01 RX ADMIN — Medication 975 MILLIGRAM(S): at 00:14

## 2023-01-01 RX ADMIN — ERTAPENEM SODIUM 120 MILLIGRAM(S): 1 INJECTION, POWDER, LYOPHILIZED, FOR SOLUTION INTRAMUSCULAR; INTRAVENOUS at 16:21

## 2023-01-01 RX ADMIN — Medication 975 MILLIGRAM(S): at 06:18

## 2023-01-01 RX ADMIN — Medication 4: at 16:44

## 2023-01-01 RX ADMIN — Medication 3 MILLIGRAM(S): at 00:37

## 2023-01-01 RX ADMIN — LIDOCAINE 1 PATCH: 4 CREAM TOPICAL at 15:55

## 2023-01-01 RX ADMIN — Medication 300 MILLIGRAM(S): at 02:14

## 2023-01-01 RX ADMIN — HEPARIN SODIUM 5000 UNIT(S): 5000 INJECTION INTRAVENOUS; SUBCUTANEOUS at 13:23

## 2023-01-01 RX ADMIN — VALGANCICLOVIR 450 MILLIGRAM(S): 450 TABLET, FILM COATED ORAL at 05:23

## 2023-01-01 RX ADMIN — HEPARIN SODIUM 5000 UNIT(S): 5000 INJECTION INTRAVENOUS; SUBCUTANEOUS at 05:16

## 2023-01-01 RX ADMIN — Medication 3 MILLIGRAM(S): at 21:35

## 2023-01-01 RX ADMIN — PIPERACILLIN AND TAZOBACTAM 200 GRAM(S): 4; .5 INJECTION, POWDER, LYOPHILIZED, FOR SOLUTION INTRAVENOUS at 02:28

## 2023-01-01 RX ADMIN — Medication 975 MILLIGRAM(S): at 14:33

## 2023-01-01 RX ADMIN — Medication 1 MILLIGRAM(S): at 12:44

## 2023-01-01 RX ADMIN — SODIUM CHLORIDE 1000 MILLILITER(S): 9 INJECTION, SOLUTION INTRAVENOUS at 11:13

## 2023-01-01 RX ADMIN — Medication 250 MILLIGRAM(S): at 10:44

## 2023-01-01 RX ADMIN — Medication 1 MILLIGRAM(S): at 12:54

## 2023-01-01 RX ADMIN — Medication 8: at 12:34

## 2023-01-01 RX ADMIN — Medication 2 UNIT(S): at 09:04

## 2023-01-01 RX ADMIN — Medication 1: at 14:12

## 2023-01-01 RX ADMIN — LIDOCAINE 1 PATCH: 4 CREAM TOPICAL at 00:45

## 2023-01-01 RX ADMIN — Medication 1 MILLIGRAM(S): at 11:43

## 2023-01-01 RX ADMIN — PANTOPRAZOLE SODIUM 40 MILLIGRAM(S): 20 TABLET, DELAYED RELEASE ORAL at 08:58

## 2023-01-01 RX ADMIN — Medication 4: at 21:47

## 2023-01-01 RX ADMIN — Medication 975 MILLIGRAM(S): at 21:36

## 2023-01-01 RX ADMIN — DICLOFENAC SODIUM 4 GRAM(S): 30 GEL TOPICAL at 00:16

## 2023-01-01 RX ADMIN — MYCOPHENOLATE MOFETIL 500 MILLIGRAM(S): 250 CAPSULE ORAL at 06:18

## 2023-01-01 RX ADMIN — VALGANCICLOVIR 450 MILLIGRAM(S): 450 TABLET, FILM COATED ORAL at 11:46

## 2023-01-01 RX ADMIN — Medication 975 MILLIGRAM(S): at 21:51

## 2023-01-01 RX ADMIN — HEPARIN SODIUM 5000 UNIT(S): 5000 INJECTION INTRAVENOUS; SUBCUTANEOUS at 14:26

## 2023-01-01 RX ADMIN — Medication 4 UNIT(S): at 12:34

## 2023-01-01 RX ADMIN — LIDOCAINE 1 PATCH: 4 CREAM TOPICAL at 21:31

## 2023-01-01 RX ADMIN — Medication 2: at 21:23

## 2023-01-01 RX ADMIN — LIDOCAINE 1 PATCH: 4 CREAM TOPICAL at 22:19

## 2023-01-01 RX ADMIN — INSULIN GLARGINE 23 UNIT(S): 100 INJECTION, SOLUTION SUBCUTANEOUS at 21:22

## 2023-01-01 RX ADMIN — MYCOPHENOLATE MOFETIL 500 MILLIGRAM(S): 250 CAPSULE ORAL at 05:59

## 2023-01-01 RX ADMIN — VALGANCICLOVIR 450 MILLIGRAM(S): 450 TABLET, FILM COATED ORAL at 12:44

## 2023-01-01 RX ADMIN — PANTOPRAZOLE SODIUM 40 MILLIGRAM(S): 20 TABLET, DELAYED RELEASE ORAL at 06:19

## 2023-01-01 RX ADMIN — VALGANCICLOVIR 450 MILLIGRAM(S): 450 TABLET, FILM COATED ORAL at 11:53

## 2023-01-01 RX ADMIN — PANTOPRAZOLE SODIUM 40 MILLIGRAM(S): 20 TABLET, DELAYED RELEASE ORAL at 05:46

## 2023-01-01 RX ADMIN — Medication 500 MILLIGRAM(S): at 05:24

## 2023-01-01 RX ADMIN — MYCOPHENOLATE MOFETIL 500 MILLIGRAM(S): 250 CAPSULE ORAL at 05:36

## 2023-01-01 RX ADMIN — Medication 400 MILLIGRAM(S): at 14:26

## 2023-01-01 RX ADMIN — Medication 975 MILLIGRAM(S): at 22:21

## 2023-01-01 RX ADMIN — Medication 2: at 12:27

## 2023-01-01 RX ADMIN — SODIUM CHLORIDE 1000 MILLILITER(S): 9 INJECTION INTRAMUSCULAR; INTRAVENOUS; SUBCUTANEOUS at 00:00

## 2023-01-01 RX ADMIN — CHLORHEXIDINE GLUCONATE 1 APPLICATION(S): 213 SOLUTION TOPICAL at 12:49

## 2023-01-01 RX ADMIN — Medication 975 MILLIGRAM(S): at 13:26

## 2023-01-01 RX ADMIN — HEPARIN SODIUM 5000 UNIT(S): 5000 INJECTION INTRAVENOUS; SUBCUTANEOUS at 14:14

## 2023-01-01 RX ADMIN — MYCOPHENOLATE MOFETIL 500 MILLIGRAM(S): 250 CAPSULE ORAL at 16:55

## 2023-01-01 RX ADMIN — MYCOPHENOLATE MOFETIL 500 MILLIGRAM(S): 250 CAPSULE ORAL at 18:01

## 2023-01-01 RX ADMIN — Medication 500 MILLIGRAM(S): at 05:27

## 2023-01-01 RX ADMIN — Medication 4: at 17:00

## 2023-01-01 RX ADMIN — Medication 20 MILLIGRAM(S): at 05:30

## 2023-01-01 RX ADMIN — HEPARIN SODIUM 5000 UNIT(S): 5000 INJECTION INTRAVENOUS; SUBCUTANEOUS at 21:56

## 2023-01-01 RX ADMIN — CHLORHEXIDINE GLUCONATE 1 APPLICATION(S): 213 SOLUTION TOPICAL at 11:43

## 2023-01-01 RX ADMIN — VALGANCICLOVIR 450 MILLIGRAM(S): 450 TABLET, FILM COATED ORAL at 12:51

## 2023-01-01 RX ADMIN — LIDOCAINE 3 PATCH: 4 CREAM TOPICAL at 07:13

## 2023-01-01 RX ADMIN — Medication 2: at 09:21

## 2023-01-01 RX ADMIN — Medication 1: at 17:46

## 2023-01-01 RX ADMIN — PANTOPRAZOLE SODIUM 40 MILLIGRAM(S): 20 TABLET, DELAYED RELEASE ORAL at 06:45

## 2023-01-01 RX ADMIN — MYCOPHENOLATE MOFETIL 500 MILLIGRAM(S): 250 CAPSULE ORAL at 06:01

## 2023-01-01 RX ADMIN — Medication 8 UNIT(S): at 08:22

## 2023-01-01 RX ADMIN — Medication 10 MILLIGRAM(S): at 05:24

## 2023-01-01 RX ADMIN — Medication 975 MILLIGRAM(S): at 23:44

## 2023-01-01 RX ADMIN — Medication 1 MILLIGRAM(S): at 11:39

## 2023-01-01 RX ADMIN — Medication 105 MILLIGRAM(S): at 05:42

## 2023-01-01 RX ADMIN — SODIUM CHLORIDE 75 MILLILITER(S): 9 INJECTION, SOLUTION INTRAVENOUS at 16:17

## 2023-01-01 RX ADMIN — HEPARIN SODIUM 5000 UNIT(S): 5000 INJECTION INTRAVENOUS; SUBCUTANEOUS at 21:41

## 2023-01-01 RX ADMIN — Medication 975 MILLIGRAM(S): at 14:34

## 2023-01-01 RX ADMIN — HEPARIN SODIUM 5000 UNIT(S): 5000 INJECTION INTRAVENOUS; SUBCUTANEOUS at 05:31

## 2023-01-01 RX ADMIN — TAMSULOSIN HYDROCHLORIDE 0.4 MILLIGRAM(S): 0.4 CAPSULE ORAL at 21:25

## 2023-01-01 RX ADMIN — Medication 975 MILLIGRAM(S): at 17:14

## 2023-01-01 RX ADMIN — VALGANCICLOVIR 450 MILLIGRAM(S): 450 TABLET, FILM COATED ORAL at 12:41

## 2023-01-01 RX ADMIN — Medication 3 MILLIGRAM(S): at 21:59

## 2023-01-01 RX ADMIN — Medication 500 MILLIGRAM(S): at 06:00

## 2023-01-01 RX ADMIN — HEPARIN SODIUM 5000 UNIT(S): 5000 INJECTION INTRAVENOUS; SUBCUTANEOUS at 14:24

## 2023-01-01 RX ADMIN — MYCOPHENOLATE MOFETIL 500 MILLIGRAM(S): 250 CAPSULE ORAL at 05:16

## 2023-01-01 NOTE — PROGRESS NOTE ADULT - ASSESSMENT
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 (course c/b VRE bacteremia, CNI toxicity to both tacro and cyclosporine; switched to everolimus 7/27/20), HLD, obesity, HFpEF presenting w/ fall    # fall - likely 2/2 pulmonary symptoms; other workup negative so far    # pulmonary symptoms - patient w/ 1+ month of upper respiratory symptoms, of cough. Presented to ED 11/19/21 with negative workup; this current presentation now found to be COVID positive w/ CT chest showing patchy groundglass opacities. DDx for symptoms include COVID (though had symptoms of cough 1 month ago) vs everolimus pneumonitis. 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 Patient was seen by pulmonary, unable to differentiate between everolimus pneumonitis and COVID. Case was reviewed with radiology - believes that CT findings more consistent with COVID (patchy ground glass infiltrates) and not everolimus pneumonitis (no fibrosis). Would recommend treating for COVID (remdesivir) and continuing current immunosuppression with plan to re-image in a short interval. If patient's respiratory symptoms worsen, will readdress role of holding everolimus and possibly higher doses of steroids for pneumonitis.     Recommendations:  - c/w everolimus 4mg BID  - hold cellcept   - ok to continue with prednisone 5mg qd  - ok to give remdesivir per protocol  - trend CMP, INR  - rest of care per primary team    Hepatology will continue to follow.     Charles Salgado, PGY5  Gastroenterology/Hepatology Fellow  Available on [employer] Teams  [phone] ([employer] Short Range Pager)  [phone] (Long Range Pager)    After 5pm, please contact the on-call GI fellow. [phone]   [] : A student assisted with documenting this visit. I have reviewed and verified all information documented by the student, and made modifications to such information, when appropriate. [FreeTextEntry3] : excellent weight gain of 34 g/day since  appt 1 week ago\par represcribed Vit D\par parents deny acholic stools\par jaundice is likely breast milk jaundice, no concerns, will monitor clinically

## 2023-01-17 NOTE — ASSESSMENT
[FreeTextEntry1] : 66M presents s/p right foot partial second toe amputation (DOS 11/9/22) and lateral fifth metatarsal eschar.\par - Pt was seen and evaluated.\par - Right foot second toe incision site sutures intact, no hematoma formation, no fluctuance, skin edges well coapted, flaps warm and viable, no clinical signs of infection. Right foot lateral fifth metatarsal head eschar with periwound erythema, no excess heat, no drainage, no purulence. Right foot hallux prior incision site hyperkeratosis with no clinical signs of infection. Left foot no wounds, no signs of infection.\par - Using a sterile suture removal kit, the right foot second toe incision sutures were removed.\par - Using a sterile currette, the right foot lateral fifth metatarsal eschar and prior hallux incision site were debrided down to the level of but not beyond subq.\par - Clean Bone Margin Culture: No growth.\par - Clean Bone Margin Pathology: No osteomyelitis.\par - Rx PO 500mg Levaquin for 10 days.\par - Rx Santyl.\par - Recommend using Santyl daily to the right foot lateral fifth metatarsal wound followed by 4x4 gauze and kerlix daily. \par - Recommend keeping right foot dry, especially when showering.\par - RTC in 2 weeks.

## 2023-01-17 NOTE — HISTORY OF PRESENT ILLNESS
[FreeTextEntry1] : Pt presents s/p right foot second toe amputation about 1 month ago at [place]. Pt was discharged on Augmentin but was unable to fill his prescription. He began changing his dressings with DSD every other day. Additionally, he complains of a right foot lateral fifth metatarsal eschar that has been present for 1 week. Denies N/V/F/C/SOB.\par \par 12/6/22 FBS: 135mg/dL

## 2023-01-17 NOTE — PHYSICAL EXAM
[Please See PDF for [employer]] : Please See PDF for [employer]. [0] : left 0 [2+] : left 2+ [de-identified] : s/p right foot partial hallux and partial second ray resections. BL midtarsal joint 13, patella orientation central, ankle dorsiflexion 12 degrees, ankle plantarflexion 12 degrees, STJ supination 15 degrees, STJ pronation 9 degrees, hallux dorsiflexion 55 degrees, hallux plantarflexion 35 degrees, no toe contractures noted. [de-identified] : Right foot second toe incision site sutures intact, no hematoma formation, no fluctuance, skin edges well coapted, flaps warm and viable, no clinical signs of infection. Right foot lateral fifth metatarsal head eschar with periwound erythema, no excess heat, no drainage, no purulence. Right foot hallux prior incision site hyperkeratosis with no clinical signs of infection. Left foot no wounds, no signs of infection. [de-identified] : Bilateral sensation diminished to the level of the toes.

## 2023-01-22 NOTE — H&P ADULT - PROBLEM SELECTOR PLAN 1
Pt with fever of 102.3, tachycardia of 101, likely R pedal source, MISA on CKD and metabolic/lactic acidosis meet criteria for severe sepsis. Pt normotensive at this time. Of note, pt on immunosuppression of prednisone, MMF for hepatic transplant. Hx of VRE bacteremia, CMV viremia  -Blood, wound cx pending  -Pt received vanc/cefepime x1 in ED  -C/w broad spectrum antibiotics of vanc (dosed by level)/unasyn  -Resuscitation w/ IVF as needed  -Transplant ID consult Pt with fever of 102.3, tachycardia of 101, likely R pedal source, MISA on CKD and metabolic/lactic acidosis meet criteria for severe sepsis. Pt normotensive at this time. Of note, pt on immunosuppression of prednisone, MMF for hepatic transplant. Hx of VRE bacteremia, CMV viremia  -Blood, wound cx pending  -Pt received vanc/cefepime x1 in ED  -C/w broad spectrum antibiotics of vanc (dosed by level)/zosyn  -Resuscitation w/ IVF as needed (500cc bolus ordered)  -Transplant ID consult Pt with fever of 102.3, tachycardia of 101, likely R pedal source, MISA on CKD and metabolic/lactic acidosis meet criteria for severe sepsis. Pt normotensive at this time. Of note, pt on immunosuppression of prednisone, MMF for hepatic transplant. Hx of VRE bacteremia, CMV viremia  -Blood, wound cx pending  -Pt received vanc/cefepime x1 in ED  -C/w broad spectrum antibiotics of vanc (dosed by level)/zosyn  -Resuscitation w/ IVF as needed (500cc bolus ordered)  -Transplant ID consult  -trend ESR Pt with fever of 102.3, tachycardia of 101, likely R pedal source, MISA on CKD and metabolic/lactic acidosis meet criteria for severe sepsis. Pt normotensive at this time. Of note, pt on immunosuppression of prednisone, MMF for hepatic transplant. Hx of VRE bacteremia, CMV viremia  -Blood, wound cx pending  -Pt received vanc/cefepime x1 in ED  -C/w broad spectrum antibiotics of vanc (dosed by level)/zosyn  -Resuscitation w/ IVF as needed (500cc bolus ordered)  -Transplant ID consulted  -trend ESR

## 2023-01-22 NOTE — H&P ADULT - ASSESSMENT
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 Found to be febrile and tachycardic on admission w/ MISA on CKD and metabolic acidosis on labs - possible osteomyelitic changes on imaging. Admitted to medicine for further management.

## 2023-01-22 NOTE — ED ADULT NURSE NOTE - FUNCTIONAL LEVEL TOILETING, MLM
I have reviewed discharge instructions with the patient. The patient verbalized understanding. Patient left ED via Discharge Method: ambulatory to Home with self transport. The patient is ambulatory upon exit and appears in no acute distress. The patient has discharge instructions and prescription in hand. The patient does not have any questions at this time. The  is driving patient home. Opportunity for questions and clarification provided. Patient given 1 scripts. To continue your aftercare when you leave the hospital, you may receive an automated call from our care team to check in on how you are doing. This is a free service and part of our promise to provide the best care and service to meet your aftercare needs.  If you have questions, or wish to unsubscribe from this service please call [phone]. Thank you for Choosing our [facility] Emergency Department. completely dependent

## 2023-01-22 NOTE — H&P ADULT - PROBLEM SELECTOR PLAN 5
Pt admitted with hyponatremia of 130 i/s/o increasing pain of RLE as well as MISA on CKD. Possibly SIADH or dilutional given pt history of CHF +  -urine lytes to assess cause, likely multifactorial  -f/u AM BMP to trend Na  -can assess need for fluid restriction vs salt supplementation Pt admitted with hyponatremia of 130 i/s/o increasing pain of RLE as well as MISA on CKD. Lower end of normal w/ correction given hyperglycemia of 342  -f/u AM BMP to trend Na  -can f/u with urine lytes should Na downtrend

## 2023-01-22 NOTE — H&P ADULT - PROBLEM SELECTOR PLAN 3
Pt with known CKD, b/l SCr 1.6-1.8 (1.89 on discharge 3 weeks prior to admission 12/30/2022), now with SCr of 2.18 i/s/o  osteomyelitis  -F/u urine lytes  -F/u AM BMP

## 2023-01-22 NOTE — H&P ADULT - PROBLEM SELECTOR PLAN 4
Initial VBG showed pH of 7.24, lactate 2.9, pCO2 41, HCO3 19. Repeat 5 hours later showed normalization of pH to 7.33, pCO2 of 36, HCO3 of 19. Possibly due to specimen delivery delay vs multifactorial cause given GI losses, MISA on CKD, infection  -F/u VBG in AM Initial VBG showed pH of 7.24, lactate 2.9, pCO2 41, HCO3 19. Repeat 5 hours later showed normalization of pH to 7.33, pCO2 of 36, HCO3 of 19. Possibly due to specimen delivery delay vs multifactorial cause given GI losses, MISA on CKD, infection  -F/u VBG in AM  -will assess for need for bicarb supplementation

## 2023-01-22 NOTE — H&P ADULT - NSHPREVIEWOFSYSTEMS_GEN_ALL_CORE
CONSTITUTIONAL: (+) fever. No weight loss, chills, weakness or fatigue.  HEENT:  Eyes:  No visual loss, blurred vision, double vision or yellow sclerae. Ears, Nose, Throat:  No hearing loss, sneezing, congestion, runny nose or sore throat.  SKIN:  No rash or itching.  CARDIOVASCULAR:  No chest pain, chest pressure or chest discomfort. No palpitations.  RESPIRATORY:  No shortness of breath, cough or sputum.  GASTROINTESTINAL:  (+) Diarrhea. No anorexia, nausea, vomiting. No abdominal pain or blood.  GENITOURINARY:  Denies hematuria, dysuria.   NEUROLOGICAL:  No headache, dizziness, syncope, paralysis, ataxia, numbness or tingling in the extremities. No change in bowel or bladder control.  MUSCULOSKELETAL:  No muscle, back pain, joint pain or stiffness.  HEMATOLOGIC:  No anemia, bleeding or bruising.  LYMPHATICS:  No enlarged nodes.   PSYCHIATRIC:  No history of depression or anxiety.  ENDOCRINOLOGIC:  No reports of sweating, cold or heat intolerance. No polyuria or polydipsia.  ALLERGIES:  No history of asthma, hives, eczema or rhinitis. CONSTITUTIONAL: (+) fever. No weight loss, chills, weakness or fatigue.  HEENT:  Eyes:  No visual loss, blurred vision, double vision or yellow sclerae. Ears, Nose, Throat:  No hearing loss, sneezing, congestion, runny nose or sore throat.  SKIN:  No rash or itching.  CARDIOVASCULAR:  No chest pain, chest pressure or chest discomfort. No palpitations.  RESPIRATORY:  No shortness of breath, cough or sputum.  GASTROINTESTINAL:  (+) Diarrhea. No anorexia, nausea, vomiting. No abdominal pain or blood.  GENITOURINARY:  Denies hematuria, dysuria.   NEUROLOGICAL:  No headache, dizziness, syncope, paralysis, ataxia, numbness or tingling in the extremities. No change in bowel or bladder control.  MUSCULOSKELETAL:  No muscle, back pain, joint pain or stiffness. + R foot pain and redness  HEMATOLOGIC:  No anemia, bleeding or bruising.  LYMPHATICS:  No enlarged nodes.   PSYCHIATRIC:  No history of depression or anxiety.  ENDOCRINOLOGIC:  No reports of sweating, cold or heat intolerance. No polyuria or polydipsia.  ALLERGIES:  No history of asthma, hives, eczema or rhinitis.

## 2023-01-22 NOTE — ED PROVIDER NOTE - PHYSICAL EXAMINATION
General: WN/WD NAD  Head: Atraumatic  Eyes: EOM grossly in tact, no scleral icterus  ENT: moist mucous membranes  Neurology: A&Ox 3  Respiratory: normal respiratory effort  CV: Extremities warm and well perfused  Abdominal: Soft, non-distended  Extremities: No edema  Skin: No rashes, RLE lateral aspect of midfoot to forefoot with foul smelling wound. Black eschar present, erythema of surrounding tissue

## 2023-01-22 NOTE — H&P ADULT - PROBLEM SELECTOR PLAN 8
Pt wth JEAN cirrhosis, s/p OLT in 2020  -c/w prednisone, MMF, valganciclovir home medications  -check CMV Pt wth JEAN cirrhosis, s/p OLT in 2020  -c/w prednisone, MMF, valganciclovir home medications  -check CMV  -transplant hepatology consult Pt wth JEAN cirrhosis, s/p OLT in 2020  -c/w prednisone, MMF, valganciclovir home medications  -check CMV  -transplant hepatology consulted

## 2023-01-22 NOTE — H&P ADULT - PROBLEM SELECTOR PLAN 2
XR of foot c/f osteomyelitis  -Podiatry consulted in ED, recs appreciated  -Wound Cx pending  -broad spectrum coverage incl. anaerobes w/ vanc (dosed by level)/unasyn XR of foot c/f osteomyelitis  -Podiatry consulted in ED, recs appreciated  -Wound Cx pending  -broad spectrum coverage incl. anaerobes w/ vanc (dosed by level)/unasyn  -MRI R foot XR of foot c/f osteomyelitis  -Podiatry consulted in ED, recs appreciated  -Wound Cx pending  -broad spectrum coverage incl. anaerobes w/ vanc (dosed by level)/zosyn  -MRI R foot XR of foot c/f osteomyelitis  -Podiatry consulted in ED, recs appreciated  -Wound Cx pending  -broad spectrum coverage incl. anaerobes w/ vanc (dosed by level)/zosyn  -MRI R foot ordered

## 2023-01-22 NOTE — CONSULT NOTE ADULT - ASSESSMENT
67M with right foot open partial 5th ray resection wound to bone and cellulitis.  - Pt seen and evaluated.  - Afebrile, WBC 9.53, ESR/CRP pending.  - Right Foot X-Ray: When compared with prior radiograph, there appears to be mottled appearance of the stump margin of the fifth metatarsal concerning for osteomyelitis. There is surrounding soft tissue edema and air consistent with the wound.   - Right foot partial 5th ray amputation site open to bone, fibrogranular, graft incorporating, retained staple noted, mild serous drainage, erythema to the level of the ankle, excess warmth noted to the global foot. Left foot no wounds, no clinical signs of infection.   - After gaining verbal consent, used a sterile staple remover to remove the retained staple from the right foot wound.  - Right foot wound cultured.  - 12/27 LETICIA/PVR: ABIs noncompressible b/l, waveforms normal - no arterial disease.  - Recommend admit to medicine.  - Recommend IV unasyn.  - Pod Plan: Local wound care pending erythema resolution.  - Discussed with attending.

## 2023-01-22 NOTE — ED PROVIDER NOTE - ATTENDING CONTRIBUTION TO CARE
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 Patient presents today after his wound VAC fell off of his right fifth toe amputation noted to have a bad smell the foot is red and swollen with a necrotic eschar on the lateral margin podiatry was called films were ordered and IV antibiotics were ordered as well for possible continual infection.  Consider readmission patient also with vague abdominal pain and diarrhea no diarrhea at this time abdomen soft.

## 2023-01-22 NOTE — ASSESSMENT
[FreeTextEntry1] : 67 years old male with HTN, IDDM (A1C 6.7 in 11/2022), HF, DM neuropathy, PUD, DVT, JEAN cirrhosis s/p OLT 7/2/2020, hx CMV viremia, hx VRE bacteremia, multiple R foot wounds/resections who presents to ID office for followup. \par \par Admitted to [facility] 12/26/22 - 12/31/22 for foot osteomyelitis. \par On 12/29 s/p R foot partial 5th ray amputation with keracis graft application. \par Per Podiatry low concern for residual infection. \par Treated with Zosyn while admitted and discharged on Augmentin 875 mg PO Q12H with end date 1/4/23.  \par Clean bone cultures with no growth and pathology negative. \par \par Noted to have CMV PCR (12/29) 1,550 (Log 3.19). \par Discharged on Valcyte 450 mg PO Q12H\par \par --Followup with podiatry; monitor off of further antibiotics\par --Check CMV PCR today\par --Continue Valcyte pending CMV PCR result\par

## 2023-01-22 NOTE — HISTORY OF PRESENT ILLNESS
[FreeTextEntry1] : 67 years old male with HTN, IDDM (A1C 6.7 in 11/2022), HF, DM neuropathy, PUD, DVT, JEAN cirrhosis s/p OLT 7/2/2020, hx CMV viremia, hx VRE bacteremia, multiple R foot wounds/resections who presents to ID office for followup. Admitted to [facility] 12/26/22 - 12/31/22 for foot osteomyelitis. R foot Xray showed erosive changes at the distal aspect of the fifth right metatarsal and the proximal aspect of the fifth proximal phalanx at the MTP joint. On 12/29 s/p R foot partial 5th ray amputation with keracis graft application. Per Podiatry low concern for residual infection. Treated with Zosyn while admitted and discharged on Augmentin 875 mg PO Q12H with end date 1/4/23.  Clean bone cultures with no growth and pathology negative. Noted to have CMV PCR (12/29) 1,550 (Log 3.19). Discharged on Valcyte 450 mg PO Q12H\par \par Since discharge has had home wound care with noted healing at the site. No noted drainage. Has wound vac applied to the wound today. no chills or fevers. \par \par

## 2023-01-22 NOTE — ED ADULT NURSE NOTE - NSIMPLEMENTINTERV_GEN_ALL_ED
Implemented All Fall Risk Interventions:  [place] to call system. Call bell, personal items and telephone within reach. Instruct patient to call for assistance. Room bathroom lighting operational. Non-slip footwear when patient is off stretcher. Physically safe environment: no spills, clutter or unnecessary equipment. Stretcher in lowest position, wheels locked, appropriate side rails in place. Provide visual cue, wrist band, yellow gown, etc. Monitor gait and stability. Monitor for mental status changes and reorient to person, place, and time. Review medications for side effects contributing to fall risk. Reinforce activity limits and safety measures with patient and family.

## 2023-01-22 NOTE — H&P ADULT - PROBLEM SELECTOR PLAN 6
Pt with 5 day history of diarrhea (>10x/day)  -Should symptoms persist, GI PCR Pt with 5 day history of diarrhea (>10x/day), previous elevated CMV though pt currently on acycolvir. No further episodes since 1/22 AM  -Should symptoms persist, GI PCR

## 2023-01-22 NOTE — ED PROVIDER NOTE - PROGRESS NOTE DETAILS
Heidy Leroy, PGY-2, EM: s/w podiatry resident, they will evaluate the pt. Heidy Leroy, PGY-2, EM: s/w transplant hepatology. They would like to be reconsulted by medicine once admitted. Heidy Leroy, PGY-2, EM: Pt seen by podiatry. c/o infection from graft site. Recommended pt be treated with Unasyn at next dose. admission to medicine. paged hospitalist as pt previously on their service.

## 2023-01-22 NOTE — ED PROVIDER NOTE - OBJECTIVE STATEMENT
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  Patient was sent home with a wound VAC over his foot.  He states this morning when he woke up the wound VAC was not on properly so he left it at home.  He has been experiencing increasing pain in his foot.  He is also having difficulty ambulating.  Patient ambulates with a walker at baseline.  He has been experiencing diarrhea.  No fever, nausea, vomiting, cough, sore throat, trouble breathing, chest pain, abdominal pain, dysuria or increased urinary frequency.

## 2023-01-22 NOTE — H&P ADULT - NSHPPHYSICALEXAM_GEN_ALL_CORE
PHYSICAL EXAM:   GENERAL: Alert. Not confused. No acute distress. Not thin. Not cachectic. Not obese.  HEAD:  Atraumatic. Normocephalic.  EYES: EOMI. PERRLA. Normal conjunctiva/sclera.  ENT: Neck supple. No JVD. Moist oral mucosa. Not edentulous. No thrush.  LYMPH: Normal supraclavicular/cervical lymph nodes.   CARDIAC: Not tachy, Not peyman. Regular rhythm. Not irregularly irregular. S1. S2. No murmur. No rub. No distant heart sounds.  LUNG/CHEST: (+) inspiratory crackles audible bilaterally. BS equal bilaterally. No wheezes. No rhonchi.  ABDOMEN: Soft. No tenderness. No distension. No fluid wave. Normal bowel sounds.  BACK: No midline/vertebral tenderness. No flank tenderness.  VASCULAR: +2 b/l radial or ulnar pulses. Palpable DP pulses.  EXTREMITIES:  (+) Right foot partial amputation site open to bone with mild serous drainage as well as erythema extending up to ankle, warm to palpation. No ulcers or wounds noted on L foot. No clubbing. No cyanosis. No edema. Moving all 4.  NEUROLOGY: A&Ox3. Non-focal exam. Cranial nerves intact. Normal speech. Sensation intact.  PSYCH: Normal behavior. Normal affect.  SKIN: No jaundice. No erythema. No rash/lesion.  ICU Vital Signs Last 24 Hrs  T(C): 38.7 (22 Jan 2023 23:11), Max: 39.1 (22 Jan 2023 21:05)  T(F): 101.6 (22 Jan 2023 23:11), Max: 102.3 (22 Jan 2023 21:05)  HR: 102 (22 Jan 2023 22:23) (86 - 102)  BP: 128/70 (22 Jan 2023 22:23) (122/64 - 140/82)  BP(mean): --  ABP: --  ABP(mean): --  RR: 22 (22 Jan 2023 22:23) (20 - 22)  SpO2: 94% (22 Jan 2023 22:23) (94% - 100%)    O2 Parameters below as of 22 Jan 2023 22:23  Patient On (Oxygen Delivery Method): room air          I&O's Summary PHYSICAL EXAM:   GENERAL: Alert. Not confused. No acute distress. Not thin. Not cachectic. Not obese.  HEAD:  Atraumatic. Normocephalic.  EYES: EOMI.. Normal conjunctiva/sclera.  ENT: Neck supple. No JVD. Moist oral mucosa. Not edentulous. No thrush.  CARDIAC: Not tachy, Not peyman. Regular rhythm. Not irregularly irregular. S1. S2. No murmur. No rub. No distant heart sounds.  LUNG/CHEST: (+) inspiratory crackles audible bilaterally. BS equal bilaterally. No wheezes. No rhonchi.  ABDOMEN: Soft. No tenderness. No distension. No fluid wave. Normal bowel sounds.  BACK: No midline/vertebral tenderness. No flank tenderness.  VASCULAR: +2 b/l radial or ulnar pulses. Palpable DP pulses.  EXTREMITIES:  (+) Right foot partial amputation site open to bone with mild serous drainage as well as erythema extending up to ankle, warm to palpation. No ulcers or wounds noted on L foot. No clubbing. No cyanosis. No edema. Moving all 4.  NEUROLOGY: A&Ox3. Non-focal exam. Cranial nerves intact. Normal speech. Sensation intact.  PSYCH: Normal behavior. Normal affect.  SKIN: No jaundice. No erythema. No rash/lesion.  ICU Vital Signs Last 24 Hrs  T(C): 38.7 (22 Jan 2023 23:11), Max: 39.1 (22 Jan 2023 21:05)  T(F): 101.6 (22 Jan 2023 23:11), Max: 102.3 (22 Jan 2023 21:05)  HR: 102 (22 Jan 2023 22:23) (86 - 102)  BP: 128/70 (22 Jan 2023 22:23) (122/64 - 140/82)  RR: 22 (22 Jan 2023 22:23) (20 - 22)  SpO2: 94% (22 Jan 2023 22:23) (94% - 100%): room air

## 2023-01-22 NOTE — H&P ADULT - PROBLEM SELECTOR PLAN 9
DVT PPx: lovenox  Diet: Regular  Code status: Full code  Last BM: 01/22 AM  Dispo: pending PT eval  PCP: Dr. Dillan Coto  Pharmacy: [facility] DVT PPx: subQ hep  Diet: consistent carb  Code status: Full code  Last BM: 01/22 AM  Dispo: pending PT eval  PCP: Dr. Dillan Coto  Pharmacy: [facility]

## 2023-01-22 NOTE — ED ADULT NURSE NOTE - OBJECTIVE STATEMENT
First encounter with the pt, pt received from triage with complaints of worsening right foot wound and loose stool. Pt is a/ox4 and verbal. Speech is clear and able to speak in full sentences without distress. Airway is patent with no obstruction nor blocking secretions. Breathing is even and unlabored on room air. Pt has strong pulses palpated bilaterally. Pt is SR on the cardiac monitor. Neuro check is wnl. Limited rom and requires assistance.  Pt denies chest pain, n/v and sob. No acute distress noted. Wound vac is at the pt's bedside and not attached to the pt. Pt has call light within the reach of the pt at the bedside. Will continue to monitor the pt.

## 2023-01-22 NOTE — H&P ADULT - HISTORY OF PRESENT ILLNESS
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 Pt reports no subjective fever but noted to have elevated temperatures per family. Pt has also noticed increased swelling and erythema of R foot. He has had some relief of pain with tylenol. He reports 2 mechanical falls over the past 5 days, citing inability to balance on foot due to pain - once in the bathroom, once out of bed; neither w/ headstrike. He also notes watery diarrhea since Monday 1/16 (~10x/day), last BM was 1/22 AM. He denies cough, abdominal pain, nausea, vomiting, chest pain, palpitations, dyspnea, urinary symptoms.    In ED, VS Temp 102.3, tachycardia 101, /64, RR 22 w/ sat of 94% on RA. Given 1 dose vanc/cefepime.

## 2023-01-22 NOTE — CONSULT NOTE ADULT - SUBJECTIVE AND OBJECTIVE BOX
Patient is a 67y old  Male who presents with a chief complaint of     HPI:      PAST MEDICAL & SURGICAL HISTORY:  Diabetes      Hypercholesteremia      Neuropathy      Hypertension      Renal stones  25 years ago      Fatty liver disease, nonalcoholic      Obesity      Hepatic encephalopathy      GERD with esophagitis  Gastritis &amp; Non Bleeding Ulcers      GIB (gastrointestinal bleeding)      S/P cholecystectomy          MEDICATIONS  (STANDING):    MEDICATIONS  (PRN):      Allergies    codeine (Anaphylaxis)    Intolerances        VITALS:    Vital Signs Last 24 Hrs  T(C): 37 (22 Jan 2023 14:45), Max: 37 (22 Jan 2023 14:45)  T(F): 98.6 (22 Jan 2023 14:45), Max: 98.6 (22 Jan 2023 14:45)  HR: 93 (22 Jan 2023 18:19) (86 - 93)  BP: 140/82 (22 Jan 2023 18:19) (134/62 - 140/82)  BP(mean): --  RR: 22 (22 Jan 2023 18:19) (20 - 22)  SpO2: 100% (22 Jan 2023 18:19) (98% - 100%)    Parameters below as of 22 Jan 2023 18:19  Patient On (Oxygen Delivery Method): room air        LABS:                          10.7   9.53  )-----------( 313      ( 22 Jan 2023 17:10 )             33.8       01-22    130<L>  |  98  |  38<H>  ----------------------------<  335<H>  4.4   |  15<L>  |  2.18<H>    Ca    9.6      22 Jan 2023 17:10    TPro  8.1  /  Alb  3.0<L>  /  TBili  0.7  /  DBili  x   /  AST  29  /  ALT  45  /  AlkPhos  226<H>  01-22      CAPILLARY BLOOD GLUCOSE          PT/INR - ( 22 Jan 2023 17:10 )   PT: 16.3 sec;   INR: 1.40 ratio         PTT - ( 22 Jan 2023 17:10 )  PTT:25.1 sec    LOWER EXTREMITY PHYSICAL EXAM:  Vascular: DP/PT 1/4, B/L, CFT <3 seconds to R 3-5 toes and L 1-5, Temperature gradient warm to cool, B/L.   Neuro: Epicritic sensation diminished to the level of toes, B/L.  Musculoskeletal/Ortho: s/p right foot partial first and second partial amputation healed. s/p right foot partial 5th ray amputation with Kerecis graft application (DOS 12/27/22).  Skin: Right foot partial 5th ray amputation site open to bone, fibrogranular, graft incorporating, retained staple noted, mild serous drainage, erythema to the level of the ankle, excess warmth noted to the global foot. Left foot no wounds, no clinical signs of infection.     RADIOLOGY & ADDITIONAL STUDIES:     Patient is a 67y old  Male who presents with a chief complaint of right foot infection.    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  Patient was sent home with a wound VAC over his foot.  He states this morning when he woke up the wound VAC was not on properly so he left it at home.  He has been experiencing increasing pain in his foot.  He is also having difficulty ambulating.  Patient ambulates with a walker at baseline.  He has been experiencing diarrhea.  No fever, nausea, vomiting, cough, sore throat, trouble breathing, chest pain, abdominal pain, dysuria or increased urinary frequency.      PAST MEDICAL & SURGICAL HISTORY:  Diabetes      Hypercholesteremia      Neuropathy      Hypertension      Renal stones  25 years ago      Fatty liver disease, nonalcoholic      Obesity      Hepatic encephalopathy      GERD with esophagitis  Gastritis &amp; Non Bleeding Ulcers      GIB (gastrointestinal bleeding)      S/P cholecystectomy          MEDICATIONS  (STANDING):    MEDICATIONS  (PRN):      Allergies    codeine (Anaphylaxis)    Intolerances        VITALS:    Vital Signs Last 24 Hrs  T(C): 37 (22 Jan 2023 14:45), Max: 37 (22 Jan 2023 14:45)  T(F): 98.6 (22 Jan 2023 14:45), Max: 98.6 (22 Jan 2023 14:45)  HR: 93 (22 Jan 2023 18:19) (86 - 93)  BP: 140/82 (22 Jan 2023 18:19) (134/62 - 140/82)  BP(mean): --  RR: 22 (22 Jan 2023 18:19) (20 - 22)  SpO2: 100% (22 Jan 2023 18:19) (98% - 100%)    Parameters below as of 22 Jan 2023 18:19  Patient On (Oxygen Delivery Method): room air        LABS:                          10.7   9.53  )-----------( 313      ( 22 Jan 2023 17:10 )             33.8       01-22    130<L>  |  98  |  38<H>  ----------------------------<  335<H>  4.4   |  15<L>  |  2.18<H>    Ca    9.6      22 Jan 2023 17:10    TPro  8.1  /  Alb  3.0<L>  /  TBili  0.7  /  DBili  x   /  AST  29  /  ALT  45  /  AlkPhos  226<H>  01-22      CAPILLARY BLOOD GLUCOSE          PT/INR - ( 22 Jan 2023 17:10 )   PT: 16.3 sec;   INR: 1.40 ratio         PTT - ( 22 Jan 2023 17:10 )  PTT:25.1 sec    LOWER EXTREMITY PHYSICAL EXAM:  Vascular: DP/PT 1/4, B/L, CFT <3 seconds to R 3-5 toes and L 1-5, Temperature gradient warm to cool, B/L.   Neuro: Epicritic sensation diminished to the level of toes, B/L.  Musculoskeletal/Ortho: s/p right foot partial first and second partial amputation healed. s/p right foot partial 5th ray amputation with Kerecis graft application (DOS 12/27/22).  Skin: Right foot partial 5th ray amputation site open to bone, fibrogranular, graft incorporating, retained staple noted, mild serous drainage, erythema to the level of the ankle, excess warmth noted to the global foot. Left foot no wounds, no clinical signs of infection.     RADIOLOGY & ADDITIONAL STUDIES:

## 2023-01-22 NOTE — PHYSICAL EXAM
[General Appearance - Alert] : alert [General Appearance - In No Acute Distress] : in no acute distress [Sclera] : the sclera and conjunctiva were normal [Outer Ear] : the ears and nose were normal in appearance [Oropharynx] : the oropharynx was normal with no thrush [Neck Appearance] : the appearance of the neck was normal [] : no respiratory distress [Exaggerated Use Of Accessory Muscles For Inspiration] : no accessory muscle use [Edema] : there was no peripheral edema [FreeTextEntry1] : RLE foot with wound vac applied. Heel with no wounds.  [Skin Color & Pigmentation] : normal skin color and pigmentation

## 2023-01-22 NOTE — H&P ADULT - NSHPLABSRESULTS_GEN_ALL_CORE
10.7   9.53  )-----------( 313      ( 22 Jan 2023 17:10 )             33.8     01-22    130<L>  |  98  |  38<H>  ----------------------------<  335<H>  4.4   |  15<L>  |  2.18<H>    Ca    9.6      22 Jan 2023 17:10    TPro  8.1  /  Alb  3.0<L>  /  TBili  0.7  /  DBili  x   /  AST  29  /  ALT  45  /  AlkPhos  226<H>  01-22    Xray Ankle Complete 3 Views, Right (01.22.23 @ 17:07)    IMPRESSION:  Status post partial amputation of the fifth ray. When compared with prior   radiograph, there appears to be mottled appearance of the stump margin of   the fifth metatarsal concerning for osteomyelitis. There is surrounding   soft tissue edema and air. Soft tissue air is also present within the   distal soft tissues along the lateral aspect of the fourth   metatarsophalangeal joint.  There is advanced mid foot arthrosis. There   are vascular calcifications.. Labs, XR and EKG tracing personally reviewed and interpreted     10.7   9.53  )-----------( 313      ( 22 Jan 2023 17:10 )             33.8     01-22    130<L>  |  98  |  38<H>  ----------------------------<  335<H>  4.4   |  15<L>  |  2.18<H>    Ca    9.6      22 Jan 2023 17:10    TPro  8.1  /  Alb  3.0<L>  /  TBili  0.7  /  DBili  x   /  AST  29  /  ALT  45  /  AlkPhos  226<H>  01-22    Xray Ankle Complete 3 Views, Right (01.22.23 @ 17:07)    IMPRESSION:  Status post partial amputation of the fifth ray. When compared with prior   radiograph, there appears to be mottled appearance of the stump margin of   the fifth metatarsal concerning for osteomyelitis. There is surrounding   soft tissue edema and air. Soft tissue air is also present within the   distal soft tissues along the lateral aspect of the fourth   metatarsophalangeal joint.  There is advanced mid foot arthrosis. There   are vascular calcifications..

## 2023-01-22 NOTE — ED PROVIDER NOTE - CLINICAL SUMMARY MEDICAL DECISION MAKING FREE TEXT BOX
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 Differential diagnosis includes but is not limited to osteo, infection, cellulitis, necrotizing fasciitis. Pt with diarrhea and foul smelling wound. Wound vac displaced. Will consult podiatry, get XR, labs, CX, tx with ABX. Recent admission notes reviewed showing vanc/cefepime tx for osteo, will order the same. dispo pending w/u. May require re-admission if infection or C diff etiology for diarrhea.

## 2023-01-23 NOTE — CONSULT NOTE ADULT - SUBJECTIVE AND OBJECTIVE BOX
Chief Complaint:  Patient is a 67y old  Male who presents with a chief complaint of cellulitis vs ostemolyelitis (23 Jan 2023 09:56)      HPI:JESSICA MARTIN is a 67y Male s/p OLT 7/2/2020 2/2 end stage liver disease due to JEAN  (on prednisone 20 mg,  BID, valcyte 450 daily  with CMV PCR  note detected 1/18/23  per Dr. 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 Transplant Hepatology consulted for post OLT management.    Patient denying n/v abdominal pain or distention. No confusion. Endorses medication compliance.    PMHX/PSHX:  Diabetes    Hypercholesteremia    Neuropathy    Hypertension    Renal stones    Fatty liver disease, nonalcoholic    Obesity    Hepatic encephalopathy    GERD with esophagitis    GIB (gastrointestinal bleeding)    No significant past surgical history    S/P cholecystectomy      Allergies:  codeine (Anaphylaxis)      Home Medications: reviewed  Hospital Medications:  acetaminophen     Tablet .. 650 milliGRAM(s) Oral every 6 hours PRN  aluminum hydroxide/magnesium hydroxide/simethicone Suspension 30 milliLiter(s) Oral every 4 hours PRN  dextrose 5%. 1000 milliLiter(s) IV Continuous <Continuous>  dextrose 5%. 1000 milliLiter(s) IV Continuous <Continuous>  dextrose 50% Injectable 25 Gram(s) IV Push once  dextrose 50% Injectable 12.5 Gram(s) IV Push once  dextrose 50% Injectable 25 Gram(s) IV Push once  dextrose Oral Gel 15 Gram(s) Oral once PRN  folic acid 1 milliGRAM(s) Oral daily  glucagon  Injectable 1 milliGRAM(s) IntraMuscular once  heparin   Injectable 5000 Unit(s) SubCutaneous every 8 hours  insulin glargine Injectable (LANTUS) 14 Unit(s) SubCutaneous at bedtime  insulin lispro (ADMELOG) corrective regimen sliding scale   SubCutaneous three times a day before meals  insulin lispro (ADMELOG) corrective regimen sliding scale   SubCutaneous at bedtime  lidocaine   4% Patch 1 Patch Transdermal daily  melatonin 3 milliGRAM(s) Oral at bedtime PRN  mycophenolate mofetil 500 milliGRAM(s) Oral two times a day  ondansetron Injectable 4 milliGRAM(s) IV Push every 8 hours PRN  pantoprazole    Tablet 40 milliGRAM(s) Oral before breakfast  piperacillin/tazobactam IVPB.. 3.375 Gram(s) IV Intermittent every 8 hours  predniSONE   Tablet 20 milliGRAM(s) Oral daily  senna 2 Tablet(s) Oral at bedtime PRN  valGANciclovir 450 milliGRAM(s) Oral every 12 hours  vancomycin  IVPB 1500 milliGRAM(s) IV Intermittent once      Social History:   Tob: Denies  EtOH: Denies  Illicit Drugs: Denies    Family history:  No pertinent family history in first degree relatives    Family history of stomach cancer    Family history of hypertension    Family history of type 2 diabetes mellitus       ROS:   Complete and normal except as mentioned above.    PHYSICAL EXAM:   Vital Signs:  Vital Signs Last 24 Hrs  T(C): 37.7 (23 Jan 2023 08:33), Max: 39.1 (22 Jan 2023 21:05)  T(F): 99.9 (23 Jan 2023 08:33), Max: 102.3 (22 Jan 2023 21:05)  HR: 91 (23 Jan 2023 10:58) (76 - 102)  BP: 110/58 (23 Jan 2023 10:58) (108/64 - 140/82)  BP(mean): --  RR: 18 (23 Jan 2023 08:33) (16 - 22)  SpO2: 92% (23 Jan 2023 10:58) (92% - 100%)    Parameters below as of 23 Jan 2023 10:58  Patient On (Oxygen Delivery Method): room air      Daily Height in cm: 185.42 (22 Jan 2023 14:45)    Daily     GENERAL: no acute distress  NEURO: alert  HEENT: anicteric sclera, no conjunctival pallor appreciated  CHEST: no respiratory distress, no accessory muscle use  CARDIAC: regular rate, rhythm  ABDOMEN: soft, non-tender, non-distended, no rebound or guarding  EXTREMITIES: Rt foot partial amputation with crusted over skin, foot in dressing c/d/i  SKIN: no lesions noted    LABS: reviewed                        10.7   9.53  )-----------( 313      ( 22 Jan 2023 17:10 )             33.8     01-22    130<L>  |  98  |  38<H>  ----------------------------<  335<H>  4.4   |  15<L>  |  2.18<H>    Ca    9.6      22 Jan 2023 17:10    TPro  8.1  /  Alb  3.0<L>  /  TBili  0.7  /  DBili  x   /  AST  29  /  ALT  45  /  AlkPhos  226<H>  01-22    LIVER FUNCTIONS - ( 22 Jan 2023 17:10 )  Alb: 3.0 g/dL / Pro: 8.1 g/dL / ALK PHOS: 226 U/L / ALT: 45 U/L / AST: 29 U/L / GGT: x               Diagnostic Studies: see sunrise for full report

## 2023-01-23 NOTE — REVIEW OF SYSTEMS
[Skin Wound] : skin wound [Negative] : Heme/Lymph [Shortness Of Breath] : no shortness of breath [Wheezing] : no wheezing [Abdominal Pain] : no abdominal pain [Vomiting] : no vomiting [Constipation] : no constipation [Diarrhea] : no diarrhea

## 2023-01-23 NOTE — PROGRESS NOTE ADULT - PROBLEM SELECTOR PLAN 1
Pt with fever of 102.3, tachycardia of 101, likely R pedal source, MISA on CKD and metabolic/lactic acidosis meet criteria for severe sepsis. Pt normotensive at this time. Of note, pt on immunosuppression of prednisone, MMF for hepatic transplant. Hx of VRE bacteremia, CMV viremia  -Blood, wound cx pending  -Pt received vanc/cefepime x1 in ED  -C/w broad spectrum antibiotics of vanc (dosed by level)/zosyn  -Resuscitation w/ IVF as needed (500cc bolus ordered)  -Transplant ID consulted  -trend ESR Pt with fever of 102.3, tachycardia of 101, likely R pedal source, MISA on CKD and metabolic/lactic acidosis meet criteria for severe sepsis. Pt normotensive at this time. Of note, pt on immunosuppression of prednisone, MMF for hepatic transplant. Hx of VRE bacteremia, CMV viremia  -Blood culture positive for gram + cocci in 1 aerobic bottle   -Pt received vanc/cefepime x1 in ED  -C/w broad spectrum antibiotics of zosyn (discontinued vanc per ID recs)  -Resuscitation w/ IVF as needed (500cc bolus ordered)  -Transplant ID consulted; f/u recs  -trend ESR  -f/u Urine culture

## 2023-01-23 NOTE — PROGRESS NOTE ADULT - PROBLEM SELECTOR PLAN 9
DVT PPx: subQ hep  Diet: consistent carb  Code status: Full code  Last BM: 01/22 AM  Dispo: pending PT eval  PCP: Dr. Dillan Coto  Pharmacy: [facility] DVT PPx: subQ hep  Diet: consistent carb  Code status: Full code  Last BM: 01/22 AM  Dispo: [facility]  PCP: Dr. Dillan Coto  Pharmacy: [facility]

## 2023-01-23 NOTE — PROGRESS NOTE ADULT - PROBLEM SELECTOR PLAN 2
XR of foot c/f osteomyelitis  -Podiatry consulted in ED, recs appreciated  -Wound Cx pending  -broad spectrum coverage incl. anaerobes w/ vanc (dosed by level)/zosyn  -MRI R foot ordered

## 2023-01-23 NOTE — PROGRESS NOTE ADULT - SUBJECTIVE AND OBJECTIVE BOX
Patient is a 67y old  Male who presents with a chief complaint of cellulitis vs ostemolyelitis (2023 11:41)       INTERVAL HPI/OVERNIGHT EVENTS:  Patient seen and evaluated at bedside.  Pt is resting comfortable in NAD. Denies N/V/F/C.    Allergies    codeine (Anaphylaxis)    Intolerances        Vital Signs Last 24 Hrs  T(C): 37.7 (2023 08:33), Max: 39.1 (2023 21:05)  T(F): 99.9 (2023 08:33), Max: 102.3 (2023 21:05)  HR: 91 (2023 10:58) (76 - 102)  BP: 110/58 (2023 10:58) (108/64 - 140/82)  BP(mean): --  RR: 18 (2023 08:33) (16 - 22)  SpO2: 92% (2023 10:58) (92% - 100%)    Parameters below as of 2023 10:58  Patient On (Oxygen Delivery Method): room air        LABS:                        10.7   9.53  )-----------( 313      ( 2023 17:10 )             33.8     -    130<L>  |  98  |  38<H>  ----------------------------<  335<H>  4.4   |  15<L>  |  2.18<H>    Ca    9.6      2023 17:10    TPro  8.1  /  Alb  3.0<L>  /  TBili  0.7  /  DBili  x   /  AST  29  /  ALT  45  /  AlkPhos  226<H>  -22    PT/INR - ( 2023 17:10 )   PT: 16.3 sec;   INR: 1.40 ratio         PTT - ( 2023 17:10 )  PTT:25.1 sec  Urinalysis Basic - ( 2023 21:12 )    Color: Light Orange / Appearance: Turbid / S.022 / pH: x  Gluc: x / Ketone: Negative  / Bili: Negative / Urobili: Negative   Blood: x / Protein: 300 mg/dL / Nitrite: Negative   Leuk Esterase: Large / RBC: >50 /hpf / WBC >50 /HPF   Sq Epi: x / Non Sq Epi: 1 / Bacteria: Negative      CAPILLARY BLOOD GLUCOSE      POCT Blood Glucose.: 202 mg/dL (2023 11:30)  POCT Blood Glucose.: 211 mg/dL (2023 08:02)  POCT Blood Glucose.: 319 mg/dL (2023 22:18)      Lower Extremity Physical Exam:  Vascular: DP/PT 1/4, B/L, CFT <3 seconds to R 3-5 toes and L 1-5, Temperature gradient warm to cool, B/L.   Neuro: Epicritic sensation diminished to the level of toes, B/L.  Musculoskeletal/Ortho: s/p right foot partial first and second partial amputation healed. s/p right foot partial 5th ray amputation with Kerecis graft application (DOS 22).  Skin: Right foot partial 5th ray amputation site open to bone, fibrogranular, graft incorporating, retained staple noted, mild serous drainage, erythema to the level of the ankle, excess warmth noted to the global foot. Left foot no wounds, no clinical signs of infection.       RADIOLOGY & ADDITIONAL TESTS:

## 2023-01-23 NOTE — ASSESSMENT
[FreeTextEntry1] : \par Mr. 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 His other medical problems include diabetes mellitus type 2, hyperlipidemia, obesity, HFpEF with mild LV diastolic dysfunction, MGUS, chronic anemia, obstructive sleep apnea. Recent prolonged hospitalization for right foot infx requiring amputation.\par \par OLD immunosuppression regimen was everolimus 3 mg twice a day and CellCept 250 mg twice a day, prednisone 5 mg QD. He used to be on Valcyte 450 mg once a day. \par \par \par PLAN\par # Status post orthotopic liver transplantation\par Patient is status post orthotopic liver transplantation secondary to nonalcoholic steatohepatitis. He has normal CMP on recent inpatient bloodwork. Given recent podiatric amputation of the right foot, his immunosuppresive therapy was changed .  He is currently on prednisone 20 gm QD (was on prednisone 5 mg QD prior to 12/22),  cellcept 500 mg BID, and valcyte 450 mg QD (was CMV PCR +1500 in hospital 12/22). Will review his progress on the healing of from his foot amputation, and will plan on switching steroid back to m-TOR if needed.\par \par I recommended follow-up labs today that will include CBC, CMP, PT/INR, a1c and lipid panel\par \par # Dyslipidemia\par Patient was on ezetimibe 10 mg daily and rosuvastatin 20 mg daily at bedtime. In addition he was taking Lovaza. Per med review with [facility] he is not on any of these medications currently. \par \par # CMV/PCP prophylaxis\par On valcyte as listed above for recent + test result in hospital.  Being followed by  ID specialist Dr. Suarez.\par \par # Diabetes mellitus type 2\par Well-controlled diabetes at this time. . He is going to follow-up with his endocrinologist. With regards to his management of diabetes he is currently on insulin glargine 14 units subcutaneously once daily at bedtime and Admelog 9 units subcutaneously 3 times a day before meals with additional sliding scale coverage.\par \par # Coronary artery disease prophylaxis\par -no longer taking asp 81 mg\par \par RTC in 2 weeks\par \par

## 2023-01-23 NOTE — CONSULT NOTE ADULT - ATTENDING COMMENTS
MARIO JESSICA is a 67y Male s/p OLT 7/2/2020 2/2 end stage liver disease due to JEAN  (on prednisone 20 mg,  BID, valcyte 450 daily  with CMV PCR  note detected 1/18/23  per Dr. 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

## 2023-01-23 NOTE — HISTORY OF PRESENT ILLNESS
[de-identified] : Mr. 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 His other medical problems include diabetes mellitus type 2, hyperlipidemia, obesity, HFpEF with mild LV diastolic dysfunction, MGUS, chronic anemia, obstructive sleep apnea.\par \par His post transplantation course was complicated with VRE bacteremia that was resolved on antibiotics. 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 His immunosuppression was ultimately switched to everolimus with improvement of his mental status and tremors. He also has a nonhealing right foot ulcer that took a long time to resolve. His course was also further complicated with acute kidney injury on the top of chronic kidney disease that required some brief period of dialysis and prolonged hospitalization.\par \par Recent interval hospitalization from June 16, 2022 through June 29, 2022. He was initially admitted at [facility] status post fall and also was found to have a ureteral stone on Kristi 15, 2022 and was transferred to [place] in visiting hospital for further management. A CT scan of the abdomen and pelvis revealed acute nondisplaced left seventh anterior rib fracture. Small area of linear atelectasis versus infiltrate in the posterior left upper lobe. 5 mm distal left ureteral calculus causing hydronephrosis. Mild bladder wall thickening which appeared more prominent on the left was noted. Urologist examined the patient and recommended Flomax and conservative management. Subsequent ultrasound of the kidney revealed mild left hydronephrosis. No calculus was noted in the visualized ureter. He was also noted to have orthostatic hypotension and his Flomax was discontinued at the time of hospital discharge. He was transitioned initially to a subacute rehab facility where he is currently recuperating. He denies any dizziness or fall episodes in the interval.\par \par He had an MRI of the cervical spine on January 17, 2022. This revealed multilevel cervical spondylosis with variable degrees of canal and foraminal narrowing. Thoracolumbar MRI also revealed degenerative changes at multiple levels but mostly from L1-L5 and L5/S1 as well as T12/L1.\par \par Interval HPI 07/22:  follow-up visit he reports doing well. His main complaint today is increased frequency of urination, hesitancy during urination as well as increased straining. He denies any fever, chills, rigor. He does report mild dysuria at the end of urination. Currently remains on everolimus 3 mg twice daily (his dose was reduced during his recent hospitalization from 4 mg twice daily), CellCept 250 mg twice daily (dose was reduced from 500 mg twice a day day during his recent hospitalization due to GI symptoms) and his prednisone was discontinued (he was on prednisone 5 mg daily).\par \par With regards to his management of diabetes he is currently on insulin glargine 14 units subcutaneously once daily at bedtime and Admelog 10 units subcutaneously 3 times a day before meals with additional sliding scale coverage. He also remains on sertraline 100 mg daily, tramadol 50 mg half tablet every 12 hours as needed, pantoprazole 40 mg daily.\par \par Recent labs from July 4, 2022 which revealed a white cell count of 3.39, hemoglobin 10.3 g/dL, platelet count 247,000. Serum sodium was 137, potassium 4.0, creatinine 2.17 mg/dL. Liver test from June 29, 2022 revealed total bilirubin 0.2 mg/dL, AST 31, ALT 86, alkaline phosphatase 144 units/L. His CMV PCR from June 28, 2022 is now positive. CMV prophylaxis was discontinued during his recent hospitalization. \par \par Interval HPI 01/18/23:\par Patient was in hospital two weeks ago due to amputation for his right pinky toe and debridement. He currently has drain in foot also in place. He had tip right great hallux removal (in Sept) and half the toe next to it was also removed. All of these were secondary to infections. Patient states he is doing okay, but his energy is low. He still taking lantus 14 QHS, humalog 9 with meals, and sliding scale. Sugars at home have been 120-135. Patient states he is not having burning when he pees, frequency or urgency, fever, chills, N/V, melena, hematochezia, reflux. Colonoscopy was done within last year and normal. \par \par Currently taking 20 mg of prednisone (was increased from 5 mg to 20 mg in 12/22), Mycophenolate 500 mg BID, 100 mg QD zoloft, omega 3 fish oil BID,  valcyte 450 mg QD\par \par Podiatrist name is Dr. Jeter [phone]

## 2023-01-23 NOTE — CONSULT NOTE ADULT - NS ATTEND AMEND GEN_ALL_CORE FT
67M with right foot pain/swelling, cellulitis, GP bacteremia, right foot ulcer with OLT  -bacteremia from right foot   -foot improving   -dc vanco  -cont zosyn 3.375 gm iv q8  -f/u blood cx identification/sensitivities  -check repeat bcx  -check TTE  -cont Valcyte ppx  -high risk for repeated episodes of infection from foot given poorly healing ulcer     Abner Desouza  Attending Physician   Division of Infectious Disease  Office #[phone]  Available on Microsoft Teams also  After 5pm/weekend or no response, call #[phone]

## 2023-01-23 NOTE — PROGRESS NOTE ADULT - ASSESSMENT
67M with right foot open partial 5th ray resection wound to bone and cellulitis.  - Pt seen and evaluated.  - Afebrile, WBC 9.53, ESR/CRP pending.  - Right foot partial 5th ray amputation site open to bone, fibrogranular, graft incorporating, retained staple noted, mild serous drainage, erythema to the level of the ankle, excess warmth noted to the global foot. Left foot no wounds, no clinical signs of infection.  - using sterile suture removal kit, all sutures were removed and eschar was removed proximally, no pus or further tracking noted   - Right foot wound culture pending  - 12/27 LETICIA/PVR: ABIs noncompressible b/l, waveforms normal - no arterial disease.  - ordered right foot MRI to rule put abscess  - pod plan local wound care pending MR and erythema resolution  - Seen with attending.

## 2023-01-23 NOTE — CONSULT NOTE ADULT - ASSESSMENT
MARIOJESSICA is a 67y Male s/p OLT 7/2/2020 2/2 end stage liver disease due to JEAN  (on prednisone 20 mg,  BID, valcyte 450 daily  with CMV PCR  note detected 1/18/23  per Dr. 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 Transplant Hepatology consulted for post OLT management.    # End stage liver disease 2/2 JEAN s/p OLT 7/2020  # OM Rt foot    Recommendations:  - On zosyn/vanc  - On valcyte 450 q12h  - On  BID  - On prednisone 20 daily  - On PPI 40 daily  - BCx, UCx  - Daily CBC, CMP, coags  - Follow up ID recs  - Rest of care per primary    Preliminary note until signed by Attending.    Thank you for involving us in this patient's care.    Charlotte Alcocer MD  Gastroenterology/Hepatology Fellow, PGY-VI    NON-URGENT CONSULTS:  Please email [email or web address]  AT NIGHT AND ON WEEKENDS:  Contact on-call GI fellow via answering service ([phone]) from 5pm-8am and on weekends/holidays  MONDAY-FRIDAY 8AM-5PM:  Pager# [phone] ([facility])  GI Phone# [phone] ([facility]) MARIOJESSICA is a 67y Male s/p OLT 7/2/2020 2/2 end stage liver disease due to JEAN  (on prednisone 20 mg,  BID, valcyte 450 daily  with CMV PCR  note detected 1/18/23  per Dr. 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 Transplant Hepatology consulted for post OLT management.    # End stage liver disease 2/2 JEAN s/p OLT 7/2020  # OM Rt foot    Recommendations:  - On zosyn/vanc  - On valcyte 450 q12h  - On  BID  - On prednisone 20 daily  - On PPI 40 daily  - BCx, UCx  - Daily CBC, CMP, coags  - C diff/GI Pcr if diarrhea persists  - Follow up ID recs  - Rest of care per primary    Preliminary note until signed by Attending.    Thank you for involving us in this patient's care.    Charlotte Alcocer MD  Gastroenterology/Hepatology Fellow, PGY-VI    NON-URGENT CONSULTS:  Please email [email or web address]  [email or web address]  AT NIGHT AND ON WEEKENDS:  Contact on-call GI fellow via answering service ([phone]) from 5pm-8am and on weekends/holidays  MONDAY-FRIDAY 8AM-5PM:  Pager# [phone] ([facility])  GI Phone# [phone] ([facility])

## 2023-01-23 NOTE — PROGRESS NOTE ADULT - PROBLEM SELECTOR PLAN 6
Pt with 5 day history of diarrhea (>10x/day), previous elevated CMV though pt currently on acycolvir. No further episodes since 1/22 AM  -Should symptoms persist, GI PCR Pt with 5 day history of diarrhea (>10x/day), previous elevated CMV though pt currently on acycolvir. No further episodes since 1/22 AM  -f/u GI PCR  -likely cause of metabolic acidosis

## 2023-01-23 NOTE — PROGRESS NOTE ADULT - PROBLEM SELECTOR PLAN 8
Pt wth JEAN cirrhosis, s/p OLT in 2020  -c/w prednisone, MMF, valganciclovir home medications  -check CMV  -transplant hepatology consulted

## 2023-01-23 NOTE — CONSULT NOTE ADULT - ASSESSMENT
Pt is a 67y M with hx of obesity, IDDM, HFpEF (EF 65% as of 12/2021), CKD, JEAN cirrhosis s/p OLT 07/2020 c/b CNI neurotoxicity, VRE bacteremia, multiple RLE wound debridements s/p R fifth ray amputation (12/29/22) presenting for 5 day history of worsening distal RLE pain with subjective fever of 101. Pain has been worsening over the last few days     Prior cultures for recent OM with MSSA, GBS, bacteroides  Febrile 101.6  Erythema and pain has improved per patient  Osteo with air seen on imaging  h/o liver transplant on immunosuppressants  wound appears infected  1/22 Blood cultures sent x 2 sets  1/22 urine cx sent  1/22 R foot cx sent  u/a with 50 WBC and yeast like cells    Lactate 0/9   Procal pending  Currently on intermittent Vanco and continuos zosyn Pt is a 67y M with hx of obesity, IDDM, HFpEF (EF 65% as of 12/2021), CKD, JEAN cirrhosis s/p OLT 07/2020 c/b CNI neurotoxicity, VRE bacteremia, multiple RLE wound debridements s/p R fifth ray amputation (12/29/22) presenting for 5 day history of worsening distal RLE pain with subjective fever of 101. Pain has been worsening over the last few days     Prior cultures for recent OM with MSSA, GBS, bacteroides (12/26 right foot abssess cns 11/3 >4different aerobes and anerobes)  Febrile 101.6  Erythema and pain has improved per patient  Osteo with air seen on imaging  h/o liver transplant on immunosuppressants  wound appears infected  1/22 Blood cultures sent x 2 sets  1/22 urine cx sent  1/22 R foot cx sent  u/a with 50 WBC and yeast like cells    Lactate 0/9   Procal pending  Currently on intermittent Vanco and continuos zosyn Pt is a 67y M with hx of obesity, IDDM, HFpEF (EF 65% as of 12/2021), CKD, JEAN cirrhosis s/p OLT 07/2020 c/b CNI neurotoxicity, VRE bacteremia, multiple RLE wound debridements most recent s/p R fifth ray amputation (12/29/22), presenting for 5 day history of worsening distal RLE pain with subjective fever of 101. Pain has been worsening over the last few days.  OR hx:   9/22 -R foot partial amputation  11/9 R foot partial 2nd amputation  12/29 R foot partial 5th metatarsal osteo    Prior cultures for recent OM with MSSA, GBS, bacteroides (Cultures: 12/26 right foot abssess CNS, and 11/3 >4 different aerobes and anerobes)  Febrile 101.6  Erythema and pain has improved per patient  Osteo with air seen on imaging  h/o liver transplant on immunosuppressants  wound appears infected  1/22 Blood cultures sent x 2 sets  1/22 urine cx sent  1/22 R foot cx sent  u/a with 50 WBC and yeast like cells    Lactate 0/9   Procal pending  Currently on intermittent Vanco and continuos zosyn    Plan  Right foot wound appears to be improving  High risk of recurrent infection in current state  If no further OR intervention then pt will need long term abx for 6 weeks.  For now Continue broad spectrum coverage, Vanco to be dosed per level pt has CKD to keep level between 15-20. Continue zosyn for now  Follow up blood cultures  Continue to monitor fever curve  Trend WBC and creatinine levels daily  Local wound care per podiatry  Plan d/w Dr. Desouza   Pt is a 67y M with hx of obesity, IDDM, HFpEF (EF 65% as of 12/2021), CKD, JEAN cirrhosis s/p OLT 07/2020 c/b CNI neurotoxicity, VRE bacteremia, multiple RLE wound debridements most recent s/p R fifth ray amputation (12/29/22), presenting for 5 day history of worsening distal RLE pain with subjective fever of 101. Pain has been worsening over the last few days.  OR hx:   9/22 -R foot partial amputation  11/9 R foot partial 2nd amputation  12/29 R foot partial 5th metatarsal osteo    Prior cultures for recent OM with MSSA, GBS, bacteroides (Cultures: 12/26 right foot abssess CNS, and 11/3 >4 different aerobes and anerobes)  Febrile 101.6  Erythema and pain has improved per patient  Osteo with air seen on imaging  h/o liver transplant on immunosuppressants  wound appears infected  1/22 Blood cultures sent x 2 sets  1/22 urine cx sent  1/22 R foot cx sent  u/a with 50 WBC and yeast like cells    Lactate 0/9   Procal pending  Currently on intermittent Vanco and continuos zosyn    Plan  Right foot wound appears to be improving  High risk of recurrent infection in current state  If no further OR intervention then pt will need long term abx for 6 weeks.  Continue zosyn, d/c vanco   blood cultures-GPC  Repeat blood cultures for clearance  Continue to monitor fever curve  Trend WBC and creatinine levels daily  Local wound care per podiatry  Plan d/w Dr. Desouza

## 2023-01-23 NOTE — PROGRESS NOTE ADULT - SUBJECTIVE AND OBJECTIVE BOX
INTERVAL HPI/OVERNIGHT EVENTS:    SUBJECTIVE: Patient seen and examined at bedside.    OBJECTIVE:    VITAL SIGNS:  ICU Vital Signs Last 24 Hrs  T(C): 37.8 (2023 00:30), Max: 39.1 (2023 21:05)  T(F): 100.1 (2023 00:30), Max: 102.3 (2023 21:05)  HR: 76 (2023 00:30) (76 - 102)  BP: 108/64 (2023 00:30) (108/64 - 140/82)  BP(mean): --  ABP: --  ABP(mean): --  RR: 16 (2023 00:30) (16 - 22)  SpO2: 98% (2023 00:30) (94% - 100%)    O2 Parameters below as of 2023 00:30  Patient On (Oxygen Delivery Method): room air               @ 07:01  -  - @ 07:00  --------------------------------------------------------  IN: 0 mL / OUT: 200 mL / NET: -200 mL      CAPILLARY BLOOD GLUCOSE      POCT Blood Glucose.: 319 mg/dL (2023 22:18)      PHYSICAL EXAM:    General: NAD  HEENT: NC/AT; PERRL, clear conjunctiva  Neck: supple  Respiratory: CTA b/l  Cardiovascular: +S1/S2; RRR  Abdomen: soft, NT/ND; +BS x4  Extremities:  no LE edema  Vascular: WWP, 2+ peripheral pulses b/l;  Skin: normal color and turgor; no rash  Neurological: A&Ox3, move all extremities. CN II-XII intact    MEDICATIONS:  MEDICATIONS  (STANDING):  dextrose 5%. 1000 milliLiter(s) (100 mL/Hr) IV Continuous <Continuous>  dextrose 5%. 1000 milliLiter(s) (50 mL/Hr) IV Continuous <Continuous>  dextrose 50% Injectable 25 Gram(s) IV Push once  dextrose 50% Injectable 12.5 Gram(s) IV Push once  dextrose 50% Injectable 25 Gram(s) IV Push once  folic acid 1 milliGRAM(s) Oral daily  glucagon  Injectable 1 milliGRAM(s) IntraMuscular once  heparin   Injectable 5000 Unit(s) SubCutaneous every 8 hours  insulin glargine Injectable (LANTUS) 14 Unit(s) SubCutaneous at bedtime  insulin lispro (ADMELOG) corrective regimen sliding scale   SubCutaneous three times a day before meals  insulin lispro (ADMELOG) corrective regimen sliding scale   SubCutaneous at bedtime  mycophenolate mofetil 500 milliGRAM(s) Oral two times a day  pantoprazole    Tablet 40 milliGRAM(s) Oral before breakfast  piperacillin/tazobactam IVPB.. 3.375 Gram(s) IV Intermittent every 8 hours  predniSONE   Tablet 20 milliGRAM(s) Oral daily  valGANciclovir 450 milliGRAM(s) Oral every 12 hours  vancomycin  IVPB 1500 milliGRAM(s) IV Intermittent once    MEDICATIONS  (PRN):  acetaminophen     Tablet .. 650 milliGRAM(s) Oral every 6 hours PRN Temp greater or equal to 38C (100.4F), Mild Pain (1 - 3)  aluminum hydroxide/magnesium hydroxide/simethicone Suspension 30 milliLiter(s) Oral every 4 hours PRN Dyspepsia  dextrose Oral Gel 15 Gram(s) Oral once PRN Blood Glucose LESS THAN 70 milliGRAM(s)/deciliter  melatonin 3 milliGRAM(s) Oral at bedtime PRN Insomnia  ondansetron Injectable 4 milliGRAM(s) IV Push every 8 hours PRN Nausea and/or Vomiting  senna 2 Tablet(s) Oral at bedtime PRN Constipation      ALLERGIES:  Allergies    codeine (Anaphylaxis)    Intolerances        LABS:                        10.7   9.53  )-----------( 313      ( 2023 17:10 )             33.8         130<L>  |  98  |  38<H>  ----------------------------<  335<H>  4.4   |  15<L>  |  2.18<H>    Ca    9.6      2023 17:10    TPro  8.1  /  Alb  3.0<L>  /  TBili  0.7  /  DBili  x   /  AST  29  /  ALT  45  /  AlkPhos  226<H>      PT/INR - ( 2023 17:10 )   PT: 16.3 sec;   INR: 1.40 ratio         PTT - ( 2023 17:10 )  PTT:25.1 sec  Urinalysis Basic - ( 2023 21:12 )    Color: Light Orange / Appearance: Turbid / S.022 / pH: x  Gluc: x / Ketone: Negative  / Bili: Negative / Urobili: Negative   Blood: x / Protein: 300 mg/dL / Nitrite: Negative   Leuk Esterase: Large / RBC: >50 /hpf / WBC >50 /HPF   Sq Epi: x / Non Sq Epi: 1 / Bacteria: Negative        RADIOLOGY & ADDITIONAL TESTS: Reviewed. INTERVAL HPI/OVERNIGHT EVENTS: MARS OVN.     SUBJECTIVE: Patient seen and examined at bedside. This AM patient's only complaint was pain in left hip after fall. Patient otherwise denies fevers, chills, RLE pain.     OBJECTIVE:    VITAL SIGNS:  ICU Vital Signs Last 24 Hrs  T(C): 37.8 (2023 00:30), Max: 39.1 (2023 21:05)  T(F): 100.1 (2023 00:30), Max: 102.3 (2023 21:05)  HR: 76 (2023 00:30) (76 - 102)  BP: 108/64 (2023 00:30) (108/64 - 140/82)  BP(mean): --  ABP: --  ABP(mean): --  RR: 16 (2023 00:30) (16 - 22)  SpO2: 98% (2023 00:30) (94% - 100%)    O2 Parameters below as of 2023 00:30  Patient On (Oxygen Delivery Method): room air               @ 07:01  -   @ 07:00  --------------------------------------------------------  IN: 0 mL / OUT: 200 mL / NET: -200 mL      CAPILLARY BLOOD GLUCOSE      POCT Blood Glucose.: 319 mg/dL (2023 22:18)      PHYSICAL EXAM:    General: NAD  HEENT: NC/AT; PERRL, clear conjunctiva  Neck: supple  Respiratory: CTA b/l  Cardiovascular: +S1/S2; RRR  Abdomen: soft, NT/ND; +BS x4  Extremities: 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 Left foot with poor peripheral pulses. No sensation in bilateral feet.   Skin: redness and warmth around second toe stump on right foot   Neurological: A&Ox3, move all extremities.     MEDICATIONS:  MEDICATIONS  (STANDING):  dextrose 5%. 1000 milliLiter(s) (100 mL/Hr) IV Continuous <Continuous>  dextrose 5%. 1000 milliLiter(s) (50 mL/Hr) IV Continuous <Continuous>  dextrose 50% Injectable 25 Gram(s) IV Push once  dextrose 50% Injectable 12.5 Gram(s) IV Push once  dextrose 50% Injectable 25 Gram(s) IV Push once  folic acid 1 milliGRAM(s) Oral daily  glucagon  Injectable 1 milliGRAM(s) IntraMuscular once  heparin   Injectable 5000 Unit(s) SubCutaneous every 8 hours  insulin glargine Injectable (LANTUS) 14 Unit(s) SubCutaneous at bedtime  insulin lispro (ADMELOG) corrective regimen sliding scale   SubCutaneous three times a day before meals  insulin lispro (ADMELOG) corrective regimen sliding scale   SubCutaneous at bedtime  mycophenolate mofetil 500 milliGRAM(s) Oral two times a day  pantoprazole    Tablet 40 milliGRAM(s) Oral before breakfast  piperacillin/tazobactam IVPB.. 3.375 Gram(s) IV Intermittent every 8 hours  predniSONE   Tablet 20 milliGRAM(s) Oral daily  valGANciclovir 450 milliGRAM(s) Oral every 12 hours  vancomycin  IVPB 1500 milliGRAM(s) IV Intermittent once    MEDICATIONS  (PRN):  acetaminophen     Tablet .. 650 milliGRAM(s) Oral every 6 hours PRN Temp greater or equal to 38C (100.4F), Mild Pain (1 - 3)  aluminum hydroxide/magnesium hydroxide/simethicone Suspension 30 milliLiter(s) Oral every 4 hours PRN Dyspepsia  dextrose Oral Gel 15 Gram(s) Oral once PRN Blood Glucose LESS THAN 70 milliGRAM(s)/deciliter  melatonin 3 milliGRAM(s) Oral at bedtime PRN Insomnia  ondansetron Injectable 4 milliGRAM(s) IV Push every 8 hours PRN Nausea and/or Vomiting  senna 2 Tablet(s) Oral at bedtime PRN Constipation      ALLERGIES:  Allergies    codeine (Anaphylaxis)    Intolerances        LABS:                        10.7   9.53  )-----------( 313      ( 2023 17:10 )             33.8     -    130<L>  |  98  |  38<H>  ----------------------------<  335<H>  4.4   |  15<L>  |  2.18<H>    Ca    9.6      2023 17:10    TPro  8.1  /  Alb  3.0<L>  /  TBili  0.7  /  DBili  x   /  AST  29  /  ALT  45  /  AlkPhos  226<H>      PT/INR - ( 2023 17:10 )   PT: 16.3 sec;   INR: 1.40 ratio         PTT - ( 2023 17:10 )  PTT:25.1 sec  Urinalysis Basic - ( 2023 21:12 )    Color: Light Orange / Appearance: Turbid / S.022 / pH: x  Gluc: x / Ketone: Negative  / Bili: Negative / Urobili: Negative   Blood: x / Protein: 300 mg/dL / Nitrite: Negative   Leuk Esterase: Large / RBC: >50 /hpf / WBC >50 /HPF   Sq Epi: x / Non Sq Epi: 1 / Bacteria: Negative        RADIOLOGY & ADDITIONAL TESTS: Reviewed.

## 2023-01-23 NOTE — PROGRESS NOTE ADULT - ATTENDING COMMENTS
68 yo M PMH DM, liver tx on immunosuppression a/w cellulitis with suspected OM and sepsis with MISA.     #Sepsis  #NAGMA - poss 2/2 diarrhea  #MISA on CKD stage 3 - pending urine lytes, could be ATN from sepsis, pre-renal from diarrhea    - ID consulted - cont Zosyn. f/u wound cx, bcx  - Podiatry following. pending MRI  - GI PCR  - Adjust insulin   - Cont immunosuppressants pending hepatology consult   - Urine lytes sent     Rest as above.

## 2023-01-23 NOTE — PROGRESS NOTE ADULT - PROBLEM SELECTOR PLAN 4
Initial VBG showed pH of 7.24, lactate 2.9, pCO2 41, HCO3 19. Repeat 5 hours later showed normalization of pH to 7.33, pCO2 of 36, HCO3 of 19. Possibly due to specimen delivery delay vs multifactorial cause given GI losses, MISA on CKD, infection  -F/u VBG in AM  -will assess for need for bicarb supplementation

## 2023-01-23 NOTE — CONSULT NOTE ADULT - SUBJECTIVE AND OBJECTIVE BOX
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 Pt reports no subjective fever but noted to have elevated temperatures per family. Pt has also noticed increased swelling and erythema of R foot. He has had some relief of pain with tylenol. He reports 2 mechanical falls over the past 5 days, citing inability to balance on foot due to pain - once in the bathroom, once out of bed; neither w/ headstrike. He also notes watery diarrhea since  (~10x/day), last BM was  AM. He denies cough, abdominal pain, nausea, vomiting, chest pain, palpitations, dyspnea, urinary symptoms.    In ED, VS Temp 102.3, tachycardia 101, /64, RR 22 w/ sat of 94% on RA. Given 1 dose vanc/cefepime.  (2023 23:36)       REVIEW OF SYSTEMS  [  ] ROS unobtainable because:    [ x ] All other systems negative except as noted below    Constitutional:  [x ] fever [ ] chills  [ ] weight loss  [ ]night sweat  [ ]poor appetite/PO intake [ ]fatigue   Skin:  [ ] rash [ ] phlebitis [x] right foot osteo	  Eyes: [ ] icterus [ ] pain  [ ] discharge	  ENMT: [ ] sore throat  [ ] thrush [ ] ulcers [ ] exudates [ ]anosmia  Respiratory: [ ] dyspnea [ ] hemoptysis [ ] cough [ ] sputum	  Cardiovascular:  [ ] chest pain [ ] palpitations [ ] edema	  Gastrointestinal:  [ ] nausea [ ] vomiting [ ] diarrhea [ ] constipation [ ] pain	  Genitourinary:  [ ] dysuria [ ] frequency [ ] hematuria [ ] discharge [ ] flank pain  [ ] incontinence  Musculoskeletal:  [ ] myalgias [ ] arthralgias [ ] arthritis  [ ] back pain  Neurological:  [ ] headache [ ] weakness [ ] seizures  [ ] confusion/altered mental status    prior hospital charts reviewed [V]  primary team notes reviewed [V]  other consultant notes reviewed [V]    PAST MEDICAL & SURGICAL HISTORY:  Diabetes      Hypercholesteremia      Neuropathy      Hypertension      Renal stones  25 years ago      Fatty liver disease, nonalcoholic      Obesity      Hepatic encephalopathy      GERD with esophagitis  Gastritis &amp; Non Bleeding Ulcers      GIB (gastrointestinal bleeding)      S/P cholecystectomy          SOCIAL HISTORY:  - Denied smoking/vaping/alcohol/recreational drug use    FAMILY HISTORY:  Family history of stomach cancer    Family history of hypertension    Family history of type 2 diabetes mellitus        Allergies  codeine (Anaphylaxis)        ANTIMICROBIALS:  piperacillin/tazobactam IVPB.. 3.375 every 8 hours  valGANciclovir 450 every 12 hours  vancomycin  IVPB 1500 once      ANTIMICROBIALS (past 90 days):  MEDICATIONS  (STANDING):  cefepime   IVPB   100 mL/Hr IV Intermittent (23 @ 17:23)    piperacillin/tazobactam IVPB.   200 mL/Hr IV Intermittent (23 @ 02:28)    piperacillin/tazobactam IVPB.-   25 mL/Hr IV Intermittent (23 @ 05:32)    valGANciclovir   450 milliGRAM(s) Oral (23 @ 05:31)    vancomycin  IVPB   300 mL/Hr IV Intermittent (23 @ 18:09)        OTHER MEDS:   MEDICATIONS  (STANDING):  acetaminophen     Tablet .. 650 every 6 hours PRN  aluminum hydroxide/magnesium hydroxide/simethicone Suspension 30 every 4 hours PRN  dextrose 50% Injectable 25 once  dextrose 50% Injectable 12.5 once  dextrose 50% Injectable 25 once  dextrose Oral Gel 15 once PRN  glucagon  Injectable 1 once  heparin   Injectable 5000 every 8 hours  insulin glargine Injectable (LANTUS) 14 at bedtime  insulin lispro (ADMELOG) corrective regimen sliding scale  three times a day before meals  insulin lispro (ADMELOG) corrective regimen sliding scale  at bedtime  melatonin 3 at bedtime PRN  mycophenolate mofetil 500 two times a day  ondansetron Injectable 4 every 8 hours PRN  pantoprazole    Tablet 40 before breakfast  predniSONE   Tablet 20 daily  senna 2 at bedtime PRN      VITALS:  Vital Signs Last 24 Hrs  T(F): 99.9 (23 @ 08:33), Max: 102.3 (23 @ 21:05)    Vital Signs Last 24 Hrs  HR: 96 (23 @ 08:33) (76 - 102)  BP: 130/62 (23 @ 08:33) (108/64 - 140/82)  RR: 18 (23 @ 08:33)  SpO2: 96% (23 @ 08:33) (94% - 100%)  Wt(kg): --    EXAM:    GA: NAD, AOx3  HEENT: oral cavity no lesion  CV: nl S1/S2, no RMG  Lungs: CTAB, No distress  Abd: BS+, soft, nontender, no rebounding pain  Ext: no edema  Neuro: No focal deficits  Skin: Intact  IV: no phlebitis    Labs:                        10.7   9.53  )-----------( 313      ( 2023 17:10 )             33.8         130<L>  |  98  |  38<H>  ----------------------------<  335<H>  4.4   |  15<L>  |  2.18<H>    Ca    9.6      2023 17:10    TPro  8.1  /  Alb  3.0<L>  /  TBili  0.7  /  DBili  x   /  AST  29  /  ALT  45  /  AlkPhos  226<H>        WBC Trend:  WBC Count: 9.53 (23 @ 17:10)      Auto Neutrophil #: 7.84 K/uL (23 @ 17:10)  Auto Neutrophil #: 3.20 K/uL (22 @ 06:21)  Auto Neutrophil #: 7.82 K/uL (22 @ 20:08)  Band Neutrophils %: 0.9 % (22 @ 20:08)  Auto Neutrophil #: 6.8 K/uL (22 @ 16:26)      Creatine Trend:  Creatinine, Serum: 2.18 ()      Liver Biochemical Testing Trend:  Alanine Aminotransferase (ALT/SGPT): 45 ()  Alanine Aminotransferase (ALT/SGPT): 22 (-)  Alanine Aminotransferase (ALT/SGPT): 30 ()  Alanine Aminotransferase (ALT/SGPT): 20 (-11)  Alanine Aminotransferase (ALT/SGPT): 23 (11-10)  Aspartate Aminotransferase (AST/SGOT): 29 (23 @ 17:10)  Aspartate Aminotransferase (AST/SGOT): 15 (22 @ 10:09)  Aspartate Aminotransferase (AST/SGOT): 18 (22 @ 20:08)  Aspartate Aminotransferase (AST/SGOT): 11 (22 @ 06:31)  Aspartate Aminotransferase (AST/SGOT): 12 (11-10-22 @ 06:27)  Bilirubin Total, Serum: 0.7 ()  Bilirubin Total, Serum: 0.2 ()  Bilirubin Total, Serum: 0.3 ()  Bilirubin Total, Serum: 0.2 ()  Bilirubin Total, Serum: 0.2 ()      Trend LDH  21 @ 07:31  260<H>  21 @ 12:42  334<H>  20 @ 07:29  164  20 @ 11:33  281<H>  20 @ 07:11  199      Auto Eosinophil %: 0.0 % (23 @ 17:10)      Urinalysis Basic - ( 2023 21:12 )    Color: Light Orange / Appearance: Turbid / S.022 / pH: x  Gluc: x / Ketone: Negative  / Bili: Negative / Urobili: Negative   Blood: x / Protein: 300 mg/dL / Nitrite: Negative   Leuk Esterase: Large / RBC: >50 /hpf / WBC >50 /HPF   Sq Epi: x / Non Sq Epi: 1 / Bacteria: Negative        MICROBIOLOGY:    MRSA PCR Result.: NotDetec (22 @ 10:10)  MRSA PCR Result.: NotDetec (22 @ 11:26)  MRSA PCR Result.: NotDetec (22 @ 06:24)      Culture - Tissue with Gram Stain (collected 29 Dec 2022 13:56)  Source: .Tissue right foot 5th metatarsal  Final Report:    No growth at 5 days    Culture - Abscess with Gram Stain (collected 26 Dec 2022 22:03)  Source: .Abscess right foot  Final Report:    Rare Coag Negative Staphylococcus    Few Corynebacterium species    "Susceptibilities not performed"    Culture - Blood (collected 26 Dec 2022 19:55)  Source: .Blood Blood  Final Report:    No Growth Final    Culture - Blood (collected 26 Dec 2022 19:45)  Source: .Blood Blood  Final Report:    No Growth Final    Culture - Tissue with Gram Stain (collected 2022 22:58)  Source: .Tissue Other  Final Report:    No growth at 5 days    Culture - Abscess with Gram Stain (collected 2022 23:36)  Source: .Abscess R foot  Final Report:    Culture yields >4 types of aerobic and/or anaerobic bacteria    Call client services within 7 days if further workup is clinically    indicated.    Culture - Blood (collected 2022 22:28)  Source: .Blood Blood-Peripheral  Final Report:    No Growth Final    Culture - Blood (collected 2022 21:35)  Source: .Blood Blood-Peripheral  Final Report:    No Growth Final    Culture - Tissue with Gram Stain (collected 02 Sep 2022 21:21)  Source: .Tissue Other  Final Report:    Rare Staphylococcus aureus    Moderate Bacteroides fragilis "Susceptibilities not performed"    Few Streptococcus agalactiae (Group B) isolated    Group B streptococci are susceptible to ampicillin,    penicillin and cefazolin, but may be resistant to    erythromycin and clindamycin.    Recommendations for intrapartum prophylaxis for Group B    streptococci are penicillin or ampicillin.  Organism: Staphylococcus aureus  Organism: Staphylococcus aureus    Sensitivities:      -  Ampicillin/Sulbactam: S <=8/4      -  Cefazolin: S <=4      -  Clindamycin: R <=0.25 This isolate is presumed to be clindamycin resistant based on detection of inducible resistance. Clindamycin may still be effective in some patients.      -  Erythromycin: R >4      -  Gentamicin: S <=1 Should not be used as monotherapy      -  Oxacillin: S <=0.25 Oxacillin predicts susceptibility for dicloxacillin, methicillin, and nafcillin      -  Penicillin: R >8      -  Rifampin: S <=1 Should not be used as monotherapy      -  Tetra/Doxy: S <=1      -  Trimethoprim/Sulfamethoxazole: S <=0.5/9.5      -  Vancomycin: S 1      Method Type: LEONARDO    Culture - Tissue with Gram Stain (collected 02 Sep 2022 21:21)  Source: .Tissue Other  Final Report:    No growth at 5 days    COVID-19 PCR: NotDetec (22 @ 12:07)          C-Reactive Protein, Serum: 338 (01-22)              Blood Gas Venous - Lactate: 1.0 ( @ 03:50)  Blood Gas Venous - Lactate: 0.9 ( @ 03:50)  Blood Gas Venous - Lactate: 1.4 ( 21:58)  Blood Gas Venous - Lactate: 2.9 ( @ 16:30)    A1C with Estimated Average Glucose Result: 6.7 % (11-10-22 @ 06:28)  A1C with Estimated Average Glucose Result: 8.0 % (22 @ 06:33)    Sedimentation Rate, Erythrocyte: 102 mm/hr (23 @ 03:49)  Sedimentation Rate, Erythrocyte: 120 mm/hr (23 @ 17:10)  Sedimentation Rate, Erythrocyte: 99 mm/hr (22 @ 20:08)  Sedimentation Rate, Erythrocyte: 90 mm/hr (22 @ 21:51)  Sedimentation Rate, Erythrocyte: 106 mm/hr (22 @ 04:09)  Sedimentation Rate, Erythrocyte: 35 mm/hr (22 @ 12:19)    CSF:                  RADIOLOGY:  imaging below personally reviewed                     Patient is a 67y old  Male who presents with a chief complaint of cellulitis vs ostemolyelitis (2023 07:55)    HPI:  Pt is a 67y M with hx of obesity, IDDM, HFpEF (EF 65% as of 2021), CKD, 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 Pt reports no subjective fever but noted to have elevated temperatures per family. Pt has also noticed increased swelling and erythema of R foot. He has had some relief of pain with tylenol. He reports 2 mechanical falls over the past 5 days, citing inability to balance on foot due to pain - once in the bathroom, once out of bed; neither w/ headstrike. He also notes watery diarrhea since  (~10x/day), last BM was  AM. He denies cough, abdominal pain, nausea, vomiting, chest pain, palpitations, dyspnea, urinary symptoms.    In ED, VS Temp 102.3, tachycardia 101, /64, RR 22 w/ sat of 94% on RA. Given 1 dose vanc/cefepime.  (2023 23:36)       REVIEW OF SYSTEMS  [  ] ROS unobtainable because:    [ x ] All other systems negative except as noted below    Constitutional:  [x ] fever [ ] chills  [ ] weight loss  [ ]night sweat  [ ]poor appetite/PO intake [ ]fatigue   Skin:  [ ] rash [ ] phlebitis [x] right foot osteo	  Eyes: [ ] icterus [ ] pain  [ ] discharge	  ENMT: [ ] sore throat  [ ] thrush [ ] ulcers [ ] exudates [ ]anosmia  Respiratory: [ ] dyspnea [ ] hemoptysis [ ] cough [ ] sputum	  Cardiovascular:  [ ] chest pain [ ] palpitations [ ] edema	  Gastrointestinal:  [ ] nausea [ ] vomiting [ ] diarrhea [ ] constipation [ ] pain	  Genitourinary:  [ ] dysuria [ ] frequency [ ] hematuria [ ] discharge [ ] flank pain  [ ] incontinence  Musculoskeletal:  [ ] myalgias [ ] arthralgias [ ] arthritis  [ ] back pain  Neurological:  [ ] headache [ ] weakness [ ] seizures  [ ] confusion/altered mental status    prior hospital charts reviewed [V]  primary team notes reviewed [V]  other consultant notes reviewed [V]    PAST MEDICAL & SURGICAL HISTORY:  Diabetes      Hypercholesteremia      Neuropathy      Hypertension      Renal stones  25 years ago      Fatty liver disease, nonalcoholic      Obesity      Hepatic encephalopathy      GERD with esophagitis  Gastritis &amp; Non Bleeding Ulcers      GIB (gastrointestinal bleeding)      S/P cholecystectomy          SOCIAL HISTORY:  - Denied smoking/vaping/alcohol/recreational drug use    FAMILY HISTORY:  Family history of stomach cancer    Family history of hypertension    Family history of type 2 diabetes mellitus        Allergies  codeine (Anaphylaxis)        ANTIMICROBIALS:  piperacillin/tazobactam IVPB.. 3.375 every 8 hours  valGANciclovir 450 every 12 hours  vancomycin  IVPB 1500 once      ANTIMICROBIALS (past 90 days):  MEDICATIONS  (STANDING):  cefepime   IVPB   100 mL/Hr IV Intermittent (23 @ 17:23)    piperacillin/tazobactam IVPB.   200 mL/Hr IV Intermittent (23 @ 02:28)    piperacillin/tazobactam IVPB.-   25 mL/Hr IV Intermittent (23 @ 05:32)    valGANciclovir   450 milliGRAM(s) Oral (23 @ 05:31)    vancomycin  IVPB   300 mL/Hr IV Intermittent (23 @ 18:09)        OTHER MEDS:   MEDICATIONS  (STANDING):  acetaminophen     Tablet .. 650 every 6 hours PRN  aluminum hydroxide/magnesium hydroxide/simethicone Suspension 30 every 4 hours PRN  dextrose 50% Injectable 25 once  dextrose 50% Injectable 12.5 once  dextrose 50% Injectable 25 once  dextrose Oral Gel 15 once PRN  glucagon  Injectable 1 once  heparin   Injectable 5000 every 8 hours  insulin glargine Injectable (LANTUS) 14 at bedtime  insulin lispro (ADMELOG) corrective regimen sliding scale  three times a day before meals  insulin lispro (ADMELOG) corrective regimen sliding scale  at bedtime  melatonin 3 at bedtime PRN  mycophenolate mofetil 500 two times a day  ondansetron Injectable 4 every 8 hours PRN  pantoprazole    Tablet 40 before breakfast  predniSONE   Tablet 20 daily  senna 2 at bedtime PRN      VITALS:  Vital Signs Last 24 Hrs  T(F): 99.9 (23 @ 08:33), Max: 102.3 (23 @ 21:05)    Vital Signs Last 24 Hrs  HR: 96 (23 @ 08:33) (76 - 102)  BP: 130/62 (23 @ 08:33) (108/64 - 140/82)  RR: 18 (23 @ 08:33)  SpO2: 96% (23 @ 08:33) (94% - 100%)  Wt(kg): --    EXAM:    GA: NAD, AOx3  HEENT: oral cavity no lesion  CV: nl S1/S2, no RMG  Lungs: CTAB, No distress  Abd: BS+, soft, nontender, no rebounding pain  Ext: no edema  Neuro: No focal deficits  Skin: with right foot wound  IV: no phlebitis    Labs:                        10.7   9.53  )-----------( 313      ( 2023 17:10 )             33.8         130<L>  |  98  |  38<H>  ----------------------------<  335<H>  4.4   |  15<L>  |  2.18<H>    Ca    9.6      2023 17:10    TPro  8.1  /  Alb  3.0<L>  /  TBili  0.7  /  DBili  x   /  AST  29  /  ALT  45  /  AlkPhos  226<H>        WBC Trend:  WBC Count: 9.53 (23 @ 17:10)      Auto Neutrophil #: 7.84 K/uL (23 @ 17:10)  Auto Neutrophil #: 3.20 K/uL (22 @ 06:21)  Auto Neutrophil #: 7.82 K/uL (22 @ 20:08)  Band Neutrophils %: 0.9 % (22 @ 20:08)  Auto Neutrophil #: 6.8 K/uL (22 @ 16:26)      Creatine Trend:  Creatinine, Serum: 2.18 ()      Liver Biochemical Testing Trend:  Alanine Aminotransferase (ALT/SGPT): 45 ()  Alanine Aminotransferase (ALT/SGPT): 22 (12-27)  Alanine Aminotransferase (ALT/SGPT): 30 ()  Alanine Aminotransferase (ALT/SGPT): 20 (11-11)  Alanine Aminotransferase (ALT/SGPT): 23 (-10)  Aspartate Aminotransferase (AST/SGOT): 29 (23 @ 17:10)  Aspartate Aminotransferase (AST/SGOT): 15 (22 @ 10:09)  Aspartate Aminotransferase (AST/SGOT): 18 (22 @ 20:08)  Aspartate Aminotransferase (AST/SGOT): 11 (22 @ 06:31)  Aspartate Aminotransferase (AST/SGOT): 12 (11-10-22 @ 06:27)  Bilirubin Total, Serum: 0.7 ()  Bilirubin Total, Serum: 0.2 ()  Bilirubin Total, Serum: 0.3 ()  Bilirubin Total, Serum: 0.2 ()  Bilirubin Total, Serum: 0.2 ()      Trend LDH  21 @ 07:31  260<H>  21 @ 12:42  334<H>  20 @ 07:29  164  20 @ 11:33  281<H>  20 @ 07:11  199      Auto Eosinophil %: 0.0 % (23 @ 17:10)      Urinalysis Basic - ( 2023 21:12 )    Color: Light Orange / Appearance: Turbid / S.022 / pH: x  Gluc: x / Ketone: Negative  / Bili: Negative / Urobili: Negative   Blood: x / Protein: 300 mg/dL / Nitrite: Negative   Leuk Esterase: Large / RBC: >50 /hpf / WBC >50 /HPF   Sq Epi: x / Non Sq Epi: 1 / Bacteria: Negative        MICROBIOLOGY:    MRSA PCR Result.: NotDetec (22 @ 10:10)  MRSA PCR Result.: NotDetec (22 @ 11:26)  MRSA PCR Result.: NotDetec (22 @ 06:24)      Culture - Tissue with Gram Stain (collected 29 Dec 2022 13:56)  Source: .Tissue right foot 5th metatarsal  Final Report:    No growth at 5 days    Culture - Abscess with Gram Stain (collected 26 Dec 2022 22:03)  Source: .Abscess right foot  Final Report:    Rare Coag Negative Staphylococcus    Few Corynebacterium species    "Susceptibilities not performed"    Culture - Blood (collected 26 Dec 2022 19:55)  Source: .Blood Blood  Final Report:    No Growth Final    Culture - Blood (collected 26 Dec 2022 19:45)  Source: .Blood Blood  Final Report:    No Growth Final    Culture - Tissue with Gram Stain (collected 2022 22:58)  Source: .Tissue Other  Final Report:    No growth at 5 days    Culture - Abscess with Gram Stain (collected 2022 23:36)  Source: .Abscess R foot  Final Report:    Culture yields >4 types of aerobic and/or anaerobic bacteria    Call client services within 7 days if further workup is clinically    indicated.    Culture - Blood (collected 2022 22:28)  Source: .Blood Blood-Peripheral  Final Report:    No Growth Final    Culture - Blood (collected 2022 21:35)  Source: .Blood Blood-Peripheral  Final Report:    No Growth Final    Culture - Tissue with Gram Stain (collected 02 Sep 2022 21:21)  Source: .Tissue Other  Final Report:    Rare Staphylococcus aureus    Moderate Bacteroides fragilis "Susceptibilities not performed"    Few Streptococcus agalactiae (Group B) isolated    Group B streptococci are susceptible to ampicillin,    penicillin and cefazolin, but may be resistant to    erythromycin and clindamycin.    Recommendations for intrapartum prophylaxis for Group B    streptococci are penicillin or ampicillin.  Organism: Staphylococcus aureus  Organism: Staphylococcus aureus    Sensitivities:      -  Ampicillin/Sulbactam: S <=8/4      -  Cefazolin: S <=4      -  Clindamycin: R <=0.25 This isolate is presumed to be clindamycin resistant based on detection of inducible resistance. Clindamycin may still be effective in some patients.      -  Erythromycin: R >4      -  Gentamicin: S <=1 Should not be used as monotherapy      -  Oxacillin: S <=0.25 Oxacillin predicts susceptibility for dicloxacillin, methicillin, and nafcillin      -  Penicillin: R >8      -  Rifampin: S <=1 Should not be used as monotherapy      -  Tetra/Doxy: S <=1      -  Trimethoprim/Sulfamethoxazole: S <=0.5/9.5      -  Vancomycin: S 1      Method Type: LEONARDO    Culture - Tissue with Gram Stain (collected 02 Sep 2022 21:21)  Source: .Tissue Other  Final Report:    No growth at 5 days    COVID-19 PCR: NotDetec (22 @ 12:07)          C-Reactive Protein, Serum: 338 ()              Blood Gas Venous - Lactate: 1.0 ( @ 03:50)  Blood Gas Venous - Lactate: 0.9 ( @ 03:50)  Blood Gas Venous - Lactate: 1.4 ( @ 21:58)  Blood Gas Venous - Lactate: 2.9 ( @ 16:30)    A1C with Estimated Average Glucose Result: 6.7 % (11-10-22 @ 06:28)  A1C with Estimated Average Glucose Result: 8.0 % (22 @ 06:33)    Sedimentation Rate, Erythrocyte: 102 mm/hr (23 @ 03:49)  Sedimentation Rate, Erythrocyte: 120 mm/hr (23 @ 17:10)  Sedimentation Rate, Erythrocyte: 99 mm/hr (22 @ 20:08)  Sedimentation Rate, Erythrocyte: 90 mm/hr (22 @ 21:51)  Sedimentation Rate, Erythrocyte: 106 mm/hr (22 @ 04:09)  Sedimentation Rate, Erythrocyte: 35 mm/hr (22 @ 12:19)    CSF:      RADIOLOGY:  < from: Xray Foot AP + Lateral + Oblique, Right (23 @ 17:07) >  CC: [id number] EXAM:  XR FOOT COMP MIN 3 VIEWS RT   ORDERED BY: MAYRA ROBLEDO     ACC: [id number] EXAM:  XR ANKLE COMP MIN 3 VIEWS RT   ORDERED BY: MAYRA ROBLEDO     PROCEDURE DATE:  2023          INTERPRETATION:  CLINICAL INDICATION: Prior amputation. Connected wound   VAC. Evaluate for osteomyelitis    EXAM: 3 views of the right foot and 3 views of the right ankle    COMPARISON: Right foot radiograph 2022      IMPRESSION:  Status post partial amputation of the fifth ray. When compared with prior   radiograph, there appears to be mottled appearance of the stump margin of   the fifth metatarsal concerning for osteomyelitis. There is surrounding   soft tissue edema and air. Soft tissue air is also present within the   distal soft tissues along the lateral aspect of the fourth   metatarsophalangeal joint.  There is advanced mid foot arthrosis. There   are vascular calcifications.    --- End of Report ---        < end of copied text >

## 2023-01-23 NOTE — PROGRESS NOTE ADULT - PROBLEM SELECTOR PLAN 7
On lantus 14u nightly, admelog 9u TID  -c/w lantus 11u nightly, low ISS On lantus 14u nightly, admelog 9u TID  -c/w lantus 14u nightly, low ISS  -adjust insulin per SS requirements

## 2023-01-23 NOTE — PROGRESS NOTE ADULT - PROBLEM SELECTOR PLAN 5
Pt admitted with hyponatremia of 130 i/s/o increasing pain of RLE as well as MISA on CKD. Lower end of normal w/ correction given hyperglycemia of 342  -f/u AM BMP to trend Na  -can f/u with urine lytes should Na downtrend

## 2023-01-24 NOTE — PROGRESS NOTE ADULT - PROBLEM SELECTOR PLAN 4
Initial VBG showed pH of 7.24, lactate 2.9, pCO2 41, HCO3 19. Repeat 5 hours later showed normalization of pH to 7.33, pCO2 of 36, HCO3 of 19. Possibly due to specimen delivery delay vs multifactorial cause given GI losses, MISA on CKD, infection  -F/u VBG in AM  -will assess for need for bicarb supplementation Pt with known CKD, b/l SCr 1.6-1.8 (1.89 on discharge 3 weeks prior to admission 12/30/2022), now with SCr of 2.32 c/f ATN i/s/o possible osteomyelitis  -F/u urine lytes  -F/u AM BMP  -monitor urine output

## 2023-01-24 NOTE — PROGRESS NOTE ADULT - PROBLEM SELECTOR PLAN 3
Pt with known CKD, b/l SCr 1.6-1.8 (1.89 on discharge 3 weeks prior to admission 12/30/2022), now with SCr of 2.18 i/s/o  osteomyelitis  -F/u urine lytes  -F/u AM BMP Cardiovascular Risk Stratification - RCRI =  (2 points).  1. History of ischemic heart disease: No  2. History of congestive heart failure: No  3. History of cerebrovascular disease (stroke or transient ischemic attack): No  4. History of diabetes requiring preoperative insulin use: Yes  5. Chronic kidney disease (creatinine > 2 mg/dL): Yes  6. Undergoing suprainguinal vascular, intraperitoneal, or intrathoracic surgery: No  0 predictors = 0.4%, 1 predictor = 0.9%, 2 predictors = 6.6%, =3 predictors = >11%    - Overall this patient is as 6.6% risk (for cardiac death, nonfatal myocardial infarction, and nonfatal cardiac arrest perioperatively for this low risk procedure). Patient is medically optimized for TMA by podiatry    COVID prior to OR pending; ekg in EMR from 1/22; pre-op labs to be drawn in AM; NPO at midnight; Insulin regimen adjusted for NPO status

## 2023-01-24 NOTE — PROGRESS NOTE ADULT - ATTENDING COMMENTS
67y old  Male s/p OLT 7/2020 who presents with a chief complaint of cellulitis vs ostemolyelitis, good allograft function, stable, would continue the current IS, follow the liver # every other day

## 2023-01-24 NOTE — PROGRESS NOTE ADULT - PROBLEM SELECTOR PLAN 2
XR of foot c/f osteomyelitis  -Podiatry consulted in ED, recs appreciated  -Wound Cx pending  -broad spectrum coverage incl. anaerobes w/ vanc (dosed by level)/zosyn  -MRI R foot ordered XR of foot c/f osteomyelitis  -Podiatry consulted in ED, recs appreciated  -Wound Cx pending  -broad spectrum coverage incl. anaerobes w/ vanc (dosed by level)/zosyn  -MRI R foot showed signs of osteomyelitis; pending TMA by podiatry with possible biospy

## 2023-01-24 NOTE — PROGRESS NOTE ADULT - SUBJECTIVE AND OBJECTIVE BOX
Chief Complaint:  Patient is a 67y old  Male who presents with a chief complaint of cellulitis vs ostemolyelitis (24 Jan 2023 10:53)     Interval Events:   Afebrile  BCx with S. agalactiae  Foot wound with Klebsiell PNA      Hospital Medications:  acetaminophen     Tablet .. 650 milliGRAM(s) Oral every 6 hours PRN  aluminum hydroxide/magnesium hydroxide/simethicone Suspension 30 milliLiter(s) Oral every 4 hours PRN  Dakins Solution - 1/4 Strength 1 Application(s) Topical daily  dextrose 5%. 1000 milliLiter(s) IV Continuous <Continuous>  dextrose 5%. 1000 milliLiter(s) IV Continuous <Continuous>  dextrose 50% Injectable 25 Gram(s) IV Push once  dextrose 50% Injectable 12.5 Gram(s) IV Push once  dextrose 50% Injectable 25 Gram(s) IV Push once  dextrose Oral Gel 15 Gram(s) Oral once PRN  diclofenac sodium 1% Gel 4 Gram(s) Topical four times a day  folic acid 1 milliGRAM(s) Oral daily  glucagon  Injectable 1 milliGRAM(s) IntraMuscular once  heparin   Injectable 5000 Unit(s) SubCutaneous every 8 hours  insulin glargine Injectable (LANTUS) 10 Unit(s) SubCutaneous at bedtime  insulin lispro (ADMELOG) corrective regimen sliding scale   SubCutaneous three times a day before meals  insulin lispro (ADMELOG) corrective regimen sliding scale   SubCutaneous at bedtime  insulin lispro Injectable (ADMELOG) 1 Unit(s) SubCutaneous three times a day before meals  lidocaine   4% Patch 1 Patch Transdermal daily  melatonin 3 milliGRAM(s) Oral at bedtime PRN  mycophenolate mofetil 500 milliGRAM(s) Oral two times a day  ondansetron Injectable 4 milliGRAM(s) IV Push every 8 hours PRN  pantoprazole    Tablet 40 milliGRAM(s) Oral before breakfast  piperacillin/tazobactam IVPB.. 3.375 Gram(s) IV Intermittent every 8 hours  predniSONE   Tablet 20 milliGRAM(s) Oral daily  senna 2 Tablet(s) Oral at bedtime PRN      ROS:   Complete and normal except as mentioned above.    PHYSICAL EXAM:   Vital Signs:  Vital Signs Last 24 Hrs  T(C): 36.7 (24 Jan 2023 09:25), Max: 36.9 (23 Jan 2023 23:25)  T(F): 98 (24 Jan 2023 09:25), Max: 98.4 (23 Jan 2023 23:25)  HR: 72 (24 Jan 2023 09:25) (72 - 76)  BP: 119/75 (24 Jan 2023 09:25) (108/63 - 124/66)  BP(mean): --  RR: 18 (24 Jan 2023 09:25) (18 - 18)  SpO2: 96% (24 Jan 2023 09:25) (95% - 96%)    Parameters below as of 24 Jan 2023 09:25  Patient On (Oxygen Delivery Method): room air      Daily     Daily     GENERAL: no acute distress  NEURO: alert  HEENT: anicteric sclera, no conjunctival pallor appreciated  CHEST: no respiratory distress, no accessory muscle use  CARDIAC: regular rate, rhythm  ABDOMEN: soft, non-tender, non-distended, no rebound or guarding  EXTREMITIES: Rt foot partial amputation with crusted over skin, foot in dressing c/d/i  SKIN: no lesions noted    LABS: reviewed                        8.9    7.42  )-----------( 363      ( 24 Jan 2023 06:59 )             28.6     01-24    134<L>  |  102  |  45<H>  ----------------------------<  218<H>  3.6   |  19<L>  |  2.32<H>    Ca    9.1      24 Jan 2023 06:59  Phos  3.0     01-24  Mg     1.8     01-24    TPro  7.0  /  Alb  2.7<L>  /  TBili  0.2  /  DBili  x   /  AST  20  /  ALT  31  /  AlkPhos  178<H>  01-24    LIVER FUNCTIONS - ( 24 Jan 2023 06:59 )  Alb: 2.7 g/dL / Pro: 7.0 g/dL / ALK PHOS: 178 U/L / ALT: 31 U/L / AST: 20 U/L / GGT: x             Interval Diagnostic Studies: see sunrise for full report

## 2023-01-24 NOTE — PROVIDER CONTACT NOTE (OTHER) - BACKGROUND
with no further intervention. Microbiology informed me that there was a miss labeling of Blood cultures between pt and were not sure who was positive.

## 2023-01-24 NOTE — PROGRESS NOTE ADULT - PROBLEM SELECTOR PLAN 8
Pt wth JEAN cirrhosis, s/p OLT in 2020  -c/w prednisone, MMF, valganciclovir home medications  -check CMV  -transplant hepatology consulted On lantus 14u nightly, admelog 9u TID  -c/w lantus 14u nightly, low ISS  -adjust insulin per SS requirements  -reduced lantus to 10 u prior to OR and NPO

## 2023-01-24 NOTE — PROGRESS NOTE ADULT - PROBLEM SELECTOR PLAN 1
Pt with fever of 102.3, tachycardia of 101, likely R pedal source, MISA on CKD and metabolic/lactic acidosis meet criteria for severe sepsis. Pt normotensive at this time. Of note, pt on immunosuppression of prednisone, MMF for hepatic transplant. Hx of VRE bacteremia, CMV viremia  -Blood culture positive for gram + cocci in 1 aerobic bottle   -Pt received vanc/cefepime x1 in ED  -C/w broad spectrum antibiotics of zosyn (discontinued vanc per ID recs)  -Resuscitation w/ IVF as needed (500cc bolus ordered)  -Transplant ID consulted; f/u recs  -trend ESR  -f/u Urine culture Pt with fever of 102.3, tachycardia of 101, likely R pedal source, MISA on CKD and metabolic/lactic acidosis meet criteria for severe sepsis. Pt normotensive at this time. Of note, pt on immunosuppression of prednisone, MMF for hepatic transplant. Hx of VRE bacteremia, CMV viremia  -Blood culture unclear if patient's was positive per lab  -Pt received vanc/cefepime x1 in ED  -C/w broad spectrum antibiotics of zosyn (discontinued vanc per ID recs)  -Resuscitation w/ IVF as needed (500cc bolus ordered)  -Transplant ID consulted; f/u recs  -trend ESR  -f/u Urine culture

## 2023-01-24 NOTE — PROVIDER CONTACT NOTE (OTHER) - ACTION/TREATMENT ORDERED:
MD made aware, educated and assessed pt about recent findings. No further interventions were made, IV antibiotics still being given MD made aware, educated and assessed pt about recent findings, MD Simeon reordered blood cultures to be sent out for pt. Will reassess as results come in

## 2023-01-24 NOTE — PROGRESS NOTE ADULT - SUBJECTIVE AND OBJECTIVE BOX
Patient is a 67y old  Male who presents with a chief complaint of cellulitis vs ostemolyelitis (2023 07:54)       INTERVAL HPI/OVERNIGHT EVENTS:  Patient seen and evaluated at bedside.  Pt is resting comfortable in NAD. Denies N/V/F/C.    Allergies    codeine (Anaphylaxis)    Intolerances        Vital Signs Last 24 Hrs  T(C): 36.7 (2023 09:25), Max: 36.9 (2023 23:25)  T(F): 98 (2023 09:25), Max: 98.4 (2023 23:25)  HR: 72 (2023 09:25) (72 - 91)  BP: 119/75 (2023 09:25) (108/63 - 124/66)  BP(mean): --  RR: 18 (2023 09:25) (18 - 18)  SpO2: 96% (2023 09:25) (92% - 96%)    Parameters below as of 2023 09:25  Patient On (Oxygen Delivery Method): room air        LABS:                        8.9    7.42  )-----------( 363      ( 2023 06:59 )             28.6     01-24    134<L>  |  102  |  45<H>  ----------------------------<  218<H>  3.6   |  19<L>  |  2.32<H>    Ca    9.1      2023 06:59  Phos  3.0       Mg     1.8         TPro  7.0  /  Alb  2.7<L>  /  TBili  0.2  /  DBili  x   /  AST  20  /  ALT  31  /  AlkPhos  178<H>  -24    PT/INR - ( 2023 06:59 )   PT: 14.0 sec;   INR: 1.21 ratio         PTT - ( 2023 17:10 )  PTT:25.1 sec  Urinalysis Basic - ( 2023 21:12 )    Color: Light Orange / Appearance: Turbid / S.022 / pH: x  Gluc: x / Ketone: Negative  / Bili: Negative / Urobili: Negative   Blood: x / Protein: 300 mg/dL / Nitrite: Negative   Leuk Esterase: Large / RBC: >50 /hpf / WBC >50 /HPF   Sq Epi: x / Non Sq Epi: 1 / Bacteria: Negative      CAPILLARY BLOOD GLUCOSE      POCT Blood Glucose.: 223 mg/dL (2023 07:56)  POCT Blood Glucose.: 275 mg/dL (2023 21:23)  POCT Blood Glucose.: 239 mg/dL (2023 16:46)  POCT Blood Glucose.: 202 mg/dL (2023 11:30)      Lower Extremity Physical Exam:  Vascular: DP/PT 1/4, B/L, CFT <3 seconds to R 3-5 toes and L 1-5, Temperature gradient warm to cool, B/L.   Neuro: Epicritic sensation diminished to the level of toes, B/L.  Musculoskeletal/Ortho: s/p right foot partial first and second partial amputation healed. s/p right foot partial 5th ray amputation with Kerecis graft application (DOS 22).  Skin: Right foot partial 5th ray amputation site open to bone, fibrogranular, graft incorporating, mild serous drainage, erythema to the level of the ankle resolving, excess warmth noted to the global foot resolving. Left foot no wounds, no clinical signs of infection.     RADIOLOGY & ADDITIONAL TESTS:   Patient is a 67y old  Male who presents with a chief complaint of cellulitis vs ostemolyelitis (2023 07:54)       INTERVAL HPI/OVERNIGHT EVENTS:  Patient seen and evaluated at bedside.  Pt is resting comfortable in NAD. Denies N/V/F/C.    Allergies    codeine (Anaphylaxis)    Intolerances        Vital Signs Last 24 Hrs  T(C): 36.7 (2023 09:25), Max: 36.9 (2023 23:25)  T(F): 98 (2023 09:25), Max: 98.4 (2023 23:25)  HR: 72 (2023 09:25) (72 - 91)  BP: 119/75 (2023 09:25) (108/63 - 124/66)  BP(mean): --  RR: 18 (2023 09:25) (18 - 18)  SpO2: 96% (2023 09:25) (92% - 96%)    Parameters below as of 2023 09:25  Patient On (Oxygen Delivery Method): room air        LABS:                        8.9    7.42  )-----------( 363      ( 2023 06:59 )             28.6     01-24    134<L>  |  102  |  45<H>  ----------------------------<  218<H>  3.6   |  19<L>  |  2.32<H>    Ca    9.1      2023 06:59  Phos  3.0       Mg     1.8         TPro  7.0  /  Alb  2.7<L>  /  TBili  0.2  /  DBili  x   /  AST  20  /  ALT  31  /  AlkPhos  178<H>  -24    PT/INR - ( 2023 06:59 )   PT: 14.0 sec;   INR: 1.21 ratio         PTT - ( 2023 17:10 )  PTT:25.1 sec  Urinalysis Basic - ( 2023 21:12 )    Color: Light Orange / Appearance: Turbid / S.022 / pH: x  Gluc: x / Ketone: Negative  / Bili: Negative / Urobili: Negative   Blood: x / Protein: 300 mg/dL / Nitrite: Negative   Leuk Esterase: Large / RBC: >50 /hpf / WBC >50 /HPF   Sq Epi: x / Non Sq Epi: 1 / Bacteria: Negative      CAPILLARY BLOOD GLUCOSE      POCT Blood Glucose.: 223 mg/dL (2023 07:56)  POCT Blood Glucose.: 275 mg/dL (2023 21:23)  POCT Blood Glucose.: 239 mg/dL (2023 16:46)  POCT Blood Glucose.: 202 mg/dL (2023 11:30)      Lower Extremity Physical Exam:  Vascular: DP/PT 1/4, B/L, CFT <3 seconds to R 3-5 toes and L 1-5, Temperature gradient warm to cool, B/L.   Neuro: Epicritic sensation diminished to the level of toes, B/L.  Musculoskeletal/Ortho: s/p right foot partial first and second partial amputation healed. s/p right foot partial 5th ray amputation with Kerecis graft application (DOS 22).  Skin: Right foot partial 5th ray amputation site open to bone, fibrogranular, graft incorporating, mild serous drainage, erythema to the level of the ankle resolving, excess warmth noted to the global foot resolving. Left foot no wounds, no clinical signs of infection.     RADIOLOGY & ADDITIONAL TESTS:  < from: MR Foot w/wo IV Cont, Right (23 @ 14:21) >  PROCEDURE DATE:  2023          INTERPRETATION:  Clinical Information: History of the fifth ray   amputation with worsening right lower extremity pain.    Comparison: Right foot radiographs from 2023.    Technique:  MRI of the right foot.  Intravenous Contrast: 10 cc of Gadavist were administered and 0 cc were   discarded.    Findings:    The fifth ray has been removed with residual bone at the base not   includedon most sequences although there is likely abnormal marrow   signal on the sagittal sequences within the remaining bone consistent   with osteomyelitis. There is a large lateral soft tissue defect which   extends close to the bone in the region of the fifth metatarsal   phalangeal joint. There is adjacent nonenhancing soft tissue extending   more proximally which is consistent with devitalized tissue. There is   also thick enhancing soft tissue deep to the defect which runs along the   lateral margin of the fourth metatarsal which could be related to   phlegmonous change. There is increased T2 marrow signal with enhancement   at the lateral base of the fourth metatarsal, at the lateral edge of the   fourth metatarsal shaft, and at the fourth metatarsal neck and head   laterally which is consistent with osteomyelitis.. Evaluation of the   fourth digit is limited due to loss of fat saturation although there is   likely signal abnormality and enhancement at the base of the fourth   proximal phalanx consistent with osteomyelitis.    Changes at the tarsometatarsal joint are nonspecific although there is a   joint effusion which could be inflammatory or infectious in etiology.   There is also a joint effusion at the navicular medial cuneiform   articulation.    Status post amputation at the distal aspect of the first proximal   phalanx. There is trace edema and enhancement at the tip of the remaining   first metatarsal; correlate for any signs of infection in this location.    Impression:  Osteomyelitis of the fourth metatarsal and proximal fourth proximal   phalanx. Large adjacent lateral ulceration with soft tissue infection.   The remaining fifth metatarsal is minimally included but osteomyelitis is   present.    Joint effusion at the tarsometatarsal joints and at the navicular middle   cuneiform articulation which is nonspecific and could be related to   infection or inflammation.    Mild signal abnormality at the distal margin of the remaining first   proximal phalanx. Thisfinding is nonspecific but could represent   osteomyelitis in the appropriate clinical setting; correlate for signs of   soft tissue infection this location.    < end of copied text >

## 2023-01-24 NOTE — PROGRESS NOTE ADULT - ASSESSMENT
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    Febrile on 1/23: T max 101.6F  No leukocytosis     -R Foot xray on 1/22:  mottled appearance of the stump margin of the fifth metatarsal concerning for osteomyelitis, surrounding soft tissue edema and air.   -Podiatry impression: 5th metatarsal wound site open to bone, fibrogranular, graft incorporating, retained staple noted, mild serous drainage, erythema to the level of the ankle, excess warmth noted to the global foot. R foot wound Cx sent by podiatry   -MRI R foot on 1/23: OM of 4th metatarsal and 4th proximal phalanx, signal abnormality at the distal margin of the remaining first proximal phalanx ?OM, possible OM in remaining distal 5th metatarsal, Large adjacent lateral ulceration with soft tissue infection. Joint effusion at the tarsometatarsal joints and at the navicular middle cuneiform articulation, ?joint infection.     Blood Cx on 1/22: strep B (1/4 bottles) and CoNS (1/4 bottles)  R foot Wound Cx on 1/22: Klebsiella pneumonia and Strep B   UA on 1/22: WBC >50, neg bacteria   Urine Cx on 1/22: NF    Antimicrobials:   Vanc 1/22  Cefepime 1/22  Zosyn (1/23-->)    #OM 4th metatarsal and proximal phalanx, ?OM remaining 5th and 1st proximal phalanx - plan for R foot TMA by podiatry  #Strep B bacteremia 2/2 to OM  #Immunosuppressed, liver transplant recipient     Recommendations: Incomplete   -Continue Zosyn   -F/up repeat blood Cx sent on 1/24  -Plan for R foot TMA by podiatry pending medical clearance   -Get TTE     PT TO BE SEEN. PRELIM NOTE  PENDING RECS. 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    Febrile on 1/23: T max 101.6F  No leukocytosis     -R Foot xray on 1/22:  mottled appearance of the stump margin of the fifth metatarsal concerning for osteomyelitis, surrounding soft tissue edema and air.   -Podiatry impression: 5th metatarsal wound site open to bone, fibrogranular, graft incorporating, retained staple noted, mild serous drainage, erythema to the level of the ankle, excess warmth noted to the global foot. R foot wound Cx sent by podiatry   -MRI R foot on 1/23: OM of 4th metatarsal and 4th proximal phalanx, signal abnormality at the distal margin of the remaining first proximal phalanx ?OM, possible OM in remaining distal 5th metatarsal, Large adjacent lateral ulceration with soft tissue infection. Joint effusion at the tarsometatarsal joints and at the navicular middle cuneiform articulation, ?joint infection.     Blood Cx on 1/22: strep B (1/4 bottles) and CoNS (1/4 bottles)  R foot Wound Cx on 1/22: Klebsiella pneumonia and Strep B   UA on 1/22: WBC >50, neg bacteria   Urine Cx on 1/22: NF    Antimicrobials:   Vanc 1/22  Cefepime 1/22  Zosyn (1/23-->)    #OM 4th metatarsal and proximal phalanx, ?OM remaining 5th and 1st proximal phalanx - plan for R foot TMA by podiatry  #Strep B bacteremia 2/2 to OM  #Immunosuppressed, liver transplant recipient     Recommendations:   -Continue Zosyn 3.375 g IV q 8hrs  -F/up repeat blood Cx sent on 1/24  -Get TTE   -Plan for R foot TMA by podiatry pending medical clearance   -Monitor T curve and WBC trend     Seen and discussed with Dr Beto Arias MD, PGY4  ID fellow  [employer] Teams Preferred  After 5pm/weekends call [phone]       67 year old Male with PMHx of obesity, IDDM, HFpEF (EF 65% as of 12/2021), CKD, 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    Febrile on 1/23: T max 101.6F  No leukocytosis     -R Foot xray on 1/22:  mottled appearance of the stump margin of the fifth metatarsal concerning for osteomyelitis, surrounding soft tissue edema and air.   -Podiatry impression: 5th metatarsal wound site open to bone, fibrogranular, graft incorporating, retained staple noted, mild serous drainage, erythema to the level of the ankle, excess warmth noted to the global foot. R foot wound Cx sent by podiatry   -MRI R foot on 1/23: OM of 4th metatarsal and 4th proximal phalanx, signal abnormality at the distal margin of the remaining first proximal phalanx ?OM, possible OM in remaining distal 5th metatarsal, Large adjacent lateral ulceration with soft tissue infection. Joint effusion at the tarsometatarsal joints and at the navicular middle cuneiform articulation, ?joint infection.     Blood Cx on 1/22: strep B (1/4 bottles) and CoNS (1/4 bottles)  R foot Wound Cx on 1/22: Klebsiella pneumonia and Strep B   UA on 1/22: WBC >50, neg bacteria   Urine Cx on 1/22: NF    Antimicrobials:   Vanc 1/22  Cefepime 1/22  Zosyn (1/23-->)    #OM 4th metatarsal and proximal phalanx, ?OM remaining 5th and 1st proximal phalanx - plan for R foot TMA by podiatry  #Strep B bacteremia 2/2 to OM  #Positive Culture Finding (Wound Culture)  #Immunosuppressed, liver transplant recipient   #CMV Viremia    Recommendations:   -Continue Zosyn 3.375 g IV q 8hrs  -F/up repeat blood Cx sent on 1/24  -Recommend TTE   -Stop Valcyte  -Plan for R foot TMA by podiatry pending medical clearance   -Monitor T curve and WBC trend     Seen and discussed with Dr Beto Arias MD, PGY4  ID fellow  [employer] Teams Preferred  After 5pm/weekends call [phone]

## 2023-01-24 NOTE — PROGRESS NOTE ADULT - ASSESSMENT
MARIOJESSICA is a 67y Male s/p OLT 7/2/2020 2/2 end stage liver disease due to JEAN  (on prednisone 20 mg,  BID, valcyte 450 daily  with CMV PCR  note detected 1/18/23  per Dr. 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 Transplant Hepatology consulted for post OLT management.    # End stage liver disease 2/2 JEAN s/p OLT 7/2020  # OM Rt foot c/b Klebsiella PNA  # Bacteremia S Agalactiae    Recommendations:  - On zosyn/vanc  - f/u Transplant ID recs  - Continue valcyte 450 q12h  - Continue  BID  - Continue prednisone 20 daily  - On PPI 40 daily  - BCx, UCx  - Daily CBC, CMP, coags  - C diff/GI Pcr if diarrhea persists  - Follow up ID recs  - Rest of care per primary    Preliminary note until signed by Attending.    Thank you for involving us in this patient's care.    Charlotte Alcocer MD  Gastroenterology/Hepatology Fellow, PGY-VI    NON-URGENT CONSULTS:  Please email [email or web address]  [email or web address]  AT NIGHT AND ON WEEKENDS:  Contact on-call GI fellow via answering service ([phone]) from 5pm-8am and on weekends/holidays  MONDAY-FRIDAY 8AM-5PM:  Pager# [phone] ([facility])  GI Phone# [phone] ([facility])

## 2023-01-24 NOTE — PROGRESS NOTE ADULT - ASSESSMENT
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 Found to be febrile and tachycardic on admission w/ MISA on CKD and metabolic acidosis on labs - possible osteomyelitic changes on imaging. Admitted to medicine for further management.  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 Found to be febrile and tachycardic on admission w/ MISA on CKD and metabolic acidosis on labs - possible osteomyelitic changes on imaging. Admitted to medicine for further management.  Pending TMA by podiatry on 1/25 and possible bone biopsy for Osteomyelitis

## 2023-01-24 NOTE — PROGRESS NOTE ADULT - PROBLEM SELECTOR PLAN 9
DVT PPx: subQ hep  Diet: consistent carb  Code status: Full code  Last BM: 01/22 AM  Dispo: [facility]  PCP: Dr. Dillan Coto  Pharmacy: [facility] Pt wth JEAN cirrhosis, s/p OLT in 2020  -c/w prednisone, MMF, valganciclovir home medications  -check CMV  -transplant hepatology consulted

## 2023-01-24 NOTE — PROGRESS NOTE ADULT - PROBLEM SELECTOR PLAN 7
On lantus 14u nightly, admelog 9u TID  -c/w lantus 14u nightly, low ISS  -adjust insulin per SS requirements Pt with 5 day history of diarrhea (>10x/day), previous elevated CMV though pt currently on acycolvir. No further episodes since 1/24 AM  -f/u GI PCR  -likely cause of metabolic acidosis

## 2023-01-24 NOTE — PROGRESS NOTE ADULT - PROBLEM SELECTOR PLAN 5
Pt admitted with hyponatremia of 130 i/s/o increasing pain of RLE as well as MISA on CKD. Lower end of normal w/ correction given hyperglycemia of 342  -f/u AM BMP to trend Na  -can f/u with urine lytes should Na downtrend Initial VBG showed pH of 7.24, lactate 2.9, pCO2 41, HCO3 19. Repeat 5 hours later showed normalization of pH to 7.33, pCO2 of 36, HCO3 of 19. Possibly due to specimen delivery delay vs multifactorial cause given GI losses, MISA on CKD, infection  -Improving; ctm on daily bmp  -will assess for need for bicarb supplementation

## 2023-01-24 NOTE — PROGRESS NOTE ADULT - SUBJECTIVE AND OBJECTIVE BOX
Follow Up:      Interval History/ROS: Patient is a 67y old  Male who presents with a chief complaint of cellulitis vs ostemolyelitis.  ID following for OM.  Seen at bedside, ***    REVIEW OF SYSTEMS  [  ] ROS unobtainable because:    [ x ] All other systems negative except as noted below    Constitutional:  [ ] fever [ ] chills  [ ] weight loss  [ ]night sweat  [ ]poor appetite/PO intake [ ]fatigue   Skin:  [ ] rash [ ] phlebitis	  Eyes: [ ] icterus [ ] pain  [ ] discharge	  ENMT: [ ] sore throat  [ ] thrush [ ] ulcers [ ] exudates [ ]anosmia  Respiratory: [ ] dyspnea [ ] hemoptysis [ ] cough [ ] sputum	  Cardiovascular:  [ ] chest pain [ ] palpitations [ ] edema	  Gastrointestinal:  [ ] nausea [ ] vomiting [ ] diarrhea [ ] constipation [ ] pain	  Genitourinary:  [ ] dysuria [ ] frequency [ ] hematuria [ ] discharge [ ] flank pain  [ ] incontinence  Musculoskeletal:  [ ] myalgias [ ] arthralgias [ ] arthritis  [ ] back pain  Neurological:  [ ] headache [ ] weakness [ ] seizures  [ ] confusion/altered mental status    Allergies  codeine (Anaphylaxis)    ANTIMICROBIALS:    piperacillin/tazobactam IVPB.. 3.375 every 8 hours    OTHER MEDS: MEDICATIONS  (STANDING):  acetaminophen     Tablet .. 650 every 6 hours PRN  aluminum hydroxide/magnesium hydroxide/simethicone Suspension 30 every 4 hours PRN  dextrose 50% Injectable 25 once  dextrose 50% Injectable 12.5 once  dextrose 50% Injectable 25 once  dextrose Oral Gel 15 once PRN  glucagon  Injectable 1 once  heparin   Injectable 5000 every 8 hours  insulin glargine Injectable (LANTUS) 10 at bedtime  insulin lispro (ADMELOG) corrective regimen sliding scale  three times a day before meals  insulin lispro (ADMELOG) corrective regimen sliding scale  at bedtime  insulin lispro Injectable (ADMELOG) 1 three times a day before meals  melatonin 3 at bedtime PRN  mycophenolate mofetil 500 two times a day  ondansetron Injectable 4 every 8 hours PRN  pantoprazole    Tablet 40 before breakfast  predniSONE   Tablet 20 daily  senna 2 at bedtime PRN    Vital Signs Last 24 Hrs  T(F): 98 (23 @ 09:25), Max: 102.3 (23 @ 21:05)    Vital Signs Last 24 Hrs  HR: 72 (23 @ 09:25) (72 - 91)  BP: 119/75 (23 @ 09:25) (108/63 - 124/66)  RR: 18 (23 @ 09:25)  SpO2: 96% (23 @ 09:25) (92% - 96%)  Wt(kg): --    EXAM:    GA: NAD  HEENT: oral cavity no lesion  CV: nl S1/S2, no RMG  Lungs: CTAB, no distress  Abd: BS+, soft, nontender, no rebounding pain  Ext: no edema  Neuro: No focal deficits  Skin: Intact  IV: no phlebitis    Labs:                        8.9    7.42  )-----------( 363      ( 2023 06:59 )             28.6         134<L>  |  102  |  45<H>  ----------------------------<  218<H>  3.6   |  19<L>  |  2.32<H>    Ca    9.1      2023 06:59  Phos  3.0       Mg     1.8         TPro  7.0  /  Alb  2.7<L>  /  TBili  0.2  /  DBili  x   /  AST  20  /  ALT  31  /  AlkPhos  178<H>      WBC Trend:  WBC Count: 7.42 (23 @ 06:59)  WBC Count: 7.25 (23 @ 12:45)  WBC Count: 9.53 (23 @ 17:10)    Creatine Trend:  Creatinine, Serum: 2.32 ()  Creatinine, Serum: 2.32 ()  Creatinine, Serum: 2.18 ()    Liver Biochemical Testing Trend:  Alanine Aminotransferase (ALT/SGPT): 31 ()  Alanine Aminotransferase (ALT/SGPT): 31 ()  Alanine Aminotransferase (ALT/SGPT): 45 ()  Alanine Aminotransferase (ALT/SGPT): 22 (12-27)  Alanine Aminotransferase (ALT/SGPT): 30 (12-26)  Aspartate Aminotransferase (AST/SGOT): 20 (23 @ 06:59)  Aspartate Aminotransferase (AST/SGOT): 22 (23 @ 12:45)  Aspartate Aminotransferase (AST/SGOT): 29 (23 @ 17:10)  Aspartate Aminotransferase (AST/SGOT): 15 (22 @ 10:09)  Aspartate Aminotransferase (AST/SGOT): 18 (22 @ 20:08)  Bilirubin Total, Serum: 0.2 ()  Bilirubin Total, Serum: 0.4 ()  Bilirubin Total, Serum: 0.7 ()  Bilirubin Total, Serum: 0.2 ()  Bilirubin Total, Serum: 0.3 ()    Trend LDH  21 @ 07:31  260<H>  21 @ 12:42  334<H>  20 @ 07:29  164  20 @ 11:33  281<H>  20 @ 07:11  199    Urinalysis Basic - ( 2023 21:12 )    Color: Light Orange / Appearance: Turbid / S.022 / pH: x  Gluc: x / Ketone: Negative  / Bili: Negative / Urobili: Negative   Blood: x / Protein: 300 mg/dL / Nitrite: Negative   Leuk Esterase: Large / RBC: >50 /hpf / WBC >50 /HPF   Sq Epi: x / Non Sq Epi: 1 / Bacteria: Negative    MICROBIOLOGY:    MRSA PCR Result.: NotDetec (22 @ 10:10)  MRSA PCR Result.: NotDetec (22 @ 11:26)  MRSA PCR Result.: NotDetec (22 @ 06:24)    Culture - Urine (collected 2023 21:11)  Source: Clean Catch Clean Catch (Midstream)  Final Report:    <10,000 CFU/mL Normal Urogenital Shantel    Culture - Abscess with Gram Stain (collected 2023 18:56)  Source: .Abscess Right foot  Preliminary Report:    Few Klebsiella pneumoniae    Numerous Streptococcus agalactiae (Group B) isolated    Group B streptococci are susceptible to ampicillin,    penicillin and cefazolin, but may be resistant to    erythromycin and clindamycin.    Recommendations for intrapartum prophylaxis for Group B    streptococci are penicillin or ampicillin.    Culture - Blood (collected 2023 17:10)  Source: .Blood Blood-Peripheral  Final Report:    **Please Note**: This is a Corrected Report**    Please disregard result, Bactec bottles mislabeled.    Previously reported as:    Growth in aerobic bottle: Gram positive cocci in pairs    ***Blood Panel PCR results on this specimen are available    approximately 3 hours after the Gram stain result.***    Gram stain, PCR, and/or culture results may not always    correspond due to difference in methodologies.    ************************************************************    This PCR assay was performed by multiplex PCR. This    Assay tests for 66 bacterial and resistance gene targets.    Please refer to the [facility] [employer] test directory    at [email or web address] for details.  Organism: Blood Culture PCR  Organism: Blood Culture PCR    Sensitivities:      -  Streptococcus agalactiae (Group B): Detec      Method Type: PCR    Culture - Blood (collected 2023 17:00)  Source: .Blood Blood-Peripheral  Final Report:    **Please Note**: This is a Corrected Report**    Please disregard report, Bactec bottles are mislabeled.    Previously reported as:    Growth in aerobic bottle: Gram Positive Cocci in Clusters    ***Blood Panel PCR results on this specimen are available    approximately 3 hours after the Gram stain result.***    Gram stain, PCR, and/or culture results may not always    correspond due to difference in methodologies.    ************************************************************    This PCR assay was performed bymultiplex PCR. This    Assay tests for 66 bacterial and resistance gene targets.    Please refer to the [facility] [employer] test directory    at [email or web address] for details.  Organism: Blood Culture PCR  Organism: Blood Culture PCR    Sensitivities:      -  Coagulase negative Staphylococcus: Detec      Method Type: PCR    Culture - Tissue with Gram Stain (collected 29 Dec 2022 13:56)  Source: .Tissue right foot 5th metatarsal  Final Report:    No growth at 5 days    Culture - Abscess with Gram Stain (collected 26 Dec 2022 22:03)  Source: .Abscess right foot  Final Report:    Rare Coag Negative Staphylococcus    Few Corynebacterium species    "Susceptibilities not performed"    Culture - Blood (collected 26 Dec 2022 19:55)  Source: .Blood Blood  Final Report:    No Growth Final    Culture - Blood (collected 26 Dec 2022 19:45)  Source: .Blood Blood  Final Report:    No Growth Final    Culture - Tissue with Gram Stain (collected 2022 22:58)  Source: .Tissue Other  Final Report:    No growth at 5 days    Culture - Abscess with Gram Stain (collected 2022 23:36)  Source: .Abscess R foot  Final Report:    Culture yields >4 types of aerobic and/or anaerobic bacteria    Call client services within 7 days if further workup is clinically    indicated.    Cytomegalovirus By PCR:   NotDetec (23 @ 03:49)    COVID-19 PCR: NotDetec (22 @ 12:07)    Procalcitonin, Serum: 7.01 ()    C-Reactive Protein, Serum: 338 ()    Blood Gas Venous - Lactate: 1.0 ( @ 03:50)  Blood Gas Venous - Lactate: 0.9 ( @ 03:50)  Blood Gas Venous - Lactate: 1.4 ( @ 21:58)  Blood Gas Venous - Lactate: 2.9 ( @ 16:30)    Sedimentation Rate, Erythrocyte: 102 mm/hr (23 @ 03:49)  Sedimentation Rate, Erythrocyte: 120 mm/hr (23 @ 17:10)  Sedimentation Rate, Erythrocyte: 99 mm/hr (22 @ 20:08)  Sedimentation Rate, Erythrocyte: 90 mm/hr (22 @ 21:51)  Sedimentation Rate, Erythrocyte: 106 mm/hr (22 @ 04:09)  Sedimentation Rate, Erythrocyte: 35 mm/hr (22 @ 12:19)    RADIOLOGY:  imaging below personally reviewed      < from: Xray Foot AP + Lateral + Oblique, Right (23 @ 17:07) >  IMPRESSION:  Status post partial amputation of the fifth ray. When compared with prior   radiograph, there appears to be mottled appearance of the stump margin of   the fifth metatarsal concerning for osteomyelitis. There is surrounding   soft tissue edema and air. Soft tissue air is also present within the   distal soft tissues along the lateral aspect of the fourth   metatarsophalangeal joint.  There is advanced mid foot arthrosis. There   are vascular calcifications..    --- End of Report ---    < end of copied text >  < from: MR Foot w/wo IV Cont, Right (23 @ 14:21) >  Impression:  Osteomyelitis of the fourth metatarsal and proximal fourth proximal   phalanx. Large adjacent lateral ulceration with soft tissue infection.   The remaining fifth metatarsal is minimally included but osteomyelitis is   present.    Joint effusion at the tarsometatarsal joints and at the navicular middle   cuneiform articulation which is nonspecific and could be related to   infection or inflammation.    Mild signal abnormality at the distal margin of the remaining first   proximal phalanx. Thisfinding is nonspecific but could represent   osteomyelitis in the appropriate clinical setting; correlate for signs of   soft tissue infection this location.    < end of copied text >                 Follow Up:      Interval History/ROS: Patient is a 67y old  Male who presents with a chief complaint of cellulitis vs ostemolyelitis.  ID following for OM.  Seen at bedside, complaining of R foot pain, diarrhea resolved, awaiting R TMA by podiatry.     REVIEW OF SYSTEMS  [  ] ROS unobtainable because:    [ x ] All other systems negative except as noted below    Constitutional:  [ ] fever [ ] chills  [ ] weight loss  [ ]night sweat  [ ]poor appetite/PO intake [ ]fatigue   Skin:  [ ] rash [ ] phlebitis	  Eyes: [ ] icterus [ ] pain  [ ] discharge	  ENMT: [ ] sore throat  [ ] thrush [ ] ulcers [ ] exudates [ ]anosmia  Respiratory: [ ] dyspnea [ ] hemoptysis [ ] cough [ ] sputum	  Cardiovascular:  [ ] chest pain [ ] palpitations [ ] edema	  Gastrointestinal:  [ ] nausea [ ] vomiting [ ] diarrhea [ ] constipation [ ] pain	  Genitourinary:  [ ] dysuria [ ] frequency [ ] hematuria [ ] discharge [ ] flank pain  [ ] incontinence  Musculoskeletal:  [ ] myalgias [ ] arthralgias [ ] arthritis  [ ] back pain [x] R foot pain   Neurological:  [ ] headache [ ] weakness [ ] seizures  [ ] confusion/altered mental status    Allergies  codeine (Anaphylaxis)    ANTIMICROBIALS:    piperacillin/tazobactam IVPB.. 3.375 every 8 hours    OTHER MEDS: MEDICATIONS  (STANDING):  acetaminophen     Tablet .. 650 every 6 hours PRN  aluminum hydroxide/magnesium hydroxide/simethicone Suspension 30 every 4 hours PRN  dextrose 50% Injectable 25 once  dextrose 50% Injectable 12.5 once  dextrose 50% Injectable 25 once  dextrose Oral Gel 15 once PRN  glucagon  Injectable 1 once  heparin   Injectable 5000 every 8 hours  insulin glargine Injectable (LANTUS) 10 at bedtime  insulin lispro (ADMELOG) corrective regimen sliding scale  three times a day before meals  insulin lispro (ADMELOG) corrective regimen sliding scale  at bedtime  insulin lispro Injectable (ADMELOG) 1 three times a day before meals  melatonin 3 at bedtime PRN  mycophenolate mofetil 500 two times a day  ondansetron Injectable 4 every 8 hours PRN  pantoprazole    Tablet 40 before breakfast  predniSONE   Tablet 20 daily  senna 2 at bedtime PRN    Vital Signs Last 24 Hrs  T(F): 98 (23 @ 09:25), Max: 102.3 (23 @ 21:05)    Vital Signs Last 24 Hrs  HR: 72 (23 @ 09:25) (72 - 91)  BP: 119/75 (23 @ 09:25) (108/63 - 124/66)  RR: 18 (23 @ 09:25)  SpO2: 96% (23 @ 09:25) (92% - 96%)  Wt(kg): --    EXAM:    GA: NAD  HEENT: oral cavity no lesion  CV: nl S1/S2, no RMG  Lungs: CTAB, no distress  Abd: BS+, soft, nontender, no rebounding pain  Ext: R foot partial 1st, 2nd toe amputation and 5th toe amputation with wound open to bone   Neuro: No focal deficits  Skin: Intact  IV: no phlebitis    Labs:                        8.9    7.42  )-----------( 363      ( 2023 06:59 )             28.6         134<L>  |  102  |  45<H>  ----------------------------<  218<H>  3.6   |  19<L>  |  2.32<H>    Ca    9.1      2023 06:59  Phos  3.0       Mg     1.8         TPro  7.0  /  Alb  2.7<L>  /  TBili  0.2  /  DBili  x   /  AST  20  /  ALT  31  /  AlkPhos  178<H>      WBC Trend:  WBC Count: 7.42 (23 @ 06:59)  WBC Count: 7.25 (23 @ 12:45)  WBC Count: 9.53 (23 @ 17:10)    Creatine Trend:  Creatinine, Serum: 2.32 ()  Creatinine, Serum: 2.32 ()  Creatinine, Serum: 2.18 ()    Liver Biochemical Testing Trend:  Alanine Aminotransferase (ALT/SGPT): 31 ()  Alanine Aminotransferase (ALT/SGPT): 31 ()  Alanine Aminotransferase (ALT/SGPT): 45 ()  Alanine Aminotransferase (ALT/SGPT): 22 (12-27)  Alanine Aminotransferase (ALT/SGPT): 30 ()  Aspartate Aminotransferase (AST/SGOT): 20 (23 @ 06:59)  Aspartate Aminotransferase (AST/SGOT): 22 (23 @ 12:45)  Aspartate Aminotransferase (AST/SGOT): 29 (23 @ 17:10)  Aspartate Aminotransferase (AST/SGOT): 15 (22 @ 10:09)  Aspartate Aminotransferase (AST/SGOT): 18 (22 @ 20:08)  Bilirubin Total, Serum: 0.2 ()  Bilirubin Total, Serum: 0.4 ()  Bilirubin Total, Serum: 0.7 ()  Bilirubin Total, Serum: 0.2 ()  Bilirubin Total, Serum: 0.3 ()    Trend LDH  21 @ 07:31  260<H>  21 @ 12:42  334<H>  20 @ 07:29  164  20 @ 11:33  281<H>  20 @ 07:11  199    Urinalysis Basic - ( 2023 21:12 )    Color: Light Orange / Appearance: Turbid / S.022 / pH: x  Gluc: x / Ketone: Negative  / Bili: Negative / Urobili: Negative   Blood: x / Protein: 300 mg/dL / Nitrite: Negative   Leuk Esterase: Large / RBC: >50 /hpf / WBC >50 /HPF   Sq Epi: x / Non Sq Epi: 1 / Bacteria: Negative    MICROBIOLOGY:    MRSA PCR Result.: NotDetec (22 @ 10:10)  MRSA PCR Result.: NotDetec (22 @ 11:26)  MRSA PCR Result.: NotDetec (22 @ 06:24)    Culture - Urine (collected 2023 21:11)  Source: Clean Catch Clean Catch (Midstream)  Final Report:    <10,000 CFU/mL Normal Urogenital Shantel    Culture - Abscess with Gram Stain (collected 2023 18:56)  Source: .Abscess Right foot  Preliminary Report:    Few Klebsiella pneumoniae    Numerous Streptococcus agalactiae (Group B) isolated    Group B streptococci are susceptible to ampicillin,    penicillin and cefazolin, but may be resistant to    erythromycin and clindamycin.    Recommendations for intrapartum prophylaxis for Group B    streptococci are penicillin or ampicillin.    Culture - Blood (collected 2023 17:10)  Source: .Blood Blood-Peripheral  Final Report:    **Please Note**: This is a Corrected Report**    Please disregard result, Bactec bottles mislabeled.    Previously reported as:    Growth in aerobic bottle: Gram positive cocci in pairs    ***Blood Panel PCR results on this specimen are available    approximately 3 hours after the Gram stain result.***    Gram stain, PCR, and/or culture results may not always    correspond due to difference in methodologies.    ************************************************************    This PCR assay was performed by multiplex PCR. This    Assay tests for 66 bacterial and resistance gene targets.    Please refer to the [facility] [employer] test directory    at [email or web address] for details.  Organism: Blood Culture PCR  Organism: Blood Culture PCR    Sensitivities:      -  Streptococcus agalactiae (Group B): Detec      Method Type: PCR    Culture - Blood (collected 2023 17:00)  Source: .Blood Blood-Peripheral  Final Report:    **Please Note**: This is a Corrected Report**    Please disregard report, Bactec bottles are mislabeled.    Previously reported as:    Growth in aerobic bottle: Gram Positive Cocci in Clusters    ***Blood Panel PCR results on this specimen are available    approximately 3 hours after the Gram stain result.***    Gram stain, PCR, and/or culture results may not always    correspond due to difference in methodologies.    ************************************************************    This PCR assay was performed bymultiplex PCR. This    Assay tests for 66 bacterial and resistance gene targets.    Please refer to the [facility] [employer] test directory    at [email or web address] for details.  Organism: Blood Culture PCR  Organism: Blood Culture PCR    Sensitivities:      -  Coagulase negative Staphylococcus: Detec      Method Type: PCR    Culture - Tissue with Gram Stain (collected 29 Dec 2022 13:56)  Source: .Tissue right foot 5th metatarsal  Final Report:    No growth at 5 days    Culture - Abscess with Gram Stain (collected 26 Dec 2022 22:03)  Source: .Abscess right foot  Final Report:    Rare Coag Negative Staphylococcus    Few Corynebacterium species    "Susceptibilities not performed"    Culture - Blood (collected 26 Dec 2022 19:55)  Source: .Blood Blood  Final Report:    No Growth Final    Culture - Blood (collected 26 Dec 2022 19:45)  Source: .Blood Blood  Final Report:    No Growth Final    Culture - Tissue with Gram Stain (collected 2022 22:58)  Source: .Tissue Other  Final Report:    No growth at 5 days    Culture - Abscess with Gram Stain (collected 2022 23:36)  Source: .Abscess R foot  Final Report:    Culture yields >4 types of aerobic and/or anaerobic bacteria    Call client services within 7 days if further workup is clinically    indicated.    Cytomegalovirus By PCR:   NotDetec (23 @ 03:49)    COVID-19 PCR: NotDetec (22 @ 12:07)    Procalcitonin, Serum: 7.01 ()    C-Reactive Protein, Serum: 338 ()    Blood Gas Venous - Lactate: 1.0 ( @ 03:50)  Blood Gas Venous - Lactate: 0.9 ( @ 03:50)  Blood Gas Venous - Lactate: 1.4 ( @ 21:58)  Blood Gas Venous - Lactate: 2.9 ( @ 16:30)    Sedimentation Rate, Erythrocyte: 102 mm/hr (23 @ 03:49)  Sedimentation Rate, Erythrocyte: 120 mm/hr (23 @ 17:10)  Sedimentation Rate, Erythrocyte: 99 mm/hr (22 @ 20:08)  Sedimentation Rate, Erythrocyte: 90 mm/hr (22 @ 21:51)  Sedimentation Rate, Erythrocyte: 106 mm/hr (22 @ 04:09)  Sedimentation Rate, Erythrocyte: 35 mm/hr (22 @ 12:19)    RADIOLOGY:  imaging below personally reviewed      < from: Xray Foot AP + Lateral + Oblique, Right (23 @ 17:07) >  IMPRESSION:  Status post partial amputation of the fifth ray. When compared with prior   radiograph, there appears to be mottled appearance of the stump margin of   the fifth metatarsal concerning for osteomyelitis. There is surrounding   soft tissue edema and air. Soft tissue air is also present within the   distal soft tissues along the lateral aspect of the fourth   metatarsophalangeal joint.  There is advanced mid foot arthrosis. There   are vascular calcifications..    --- End of Report ---    < end of copied text >  < from: MR Foot w/wo IV Cont, Right (23 @ 14:21) >  Impression:  Osteomyelitis of the fourth metatarsal and proximal fourth proximal   phalanx. Large adjacent lateral ulceration with soft tissue infection.   The remaining fifth metatarsal is minimally included but osteomyelitis is   present.    Joint effusion at the tarsometatarsal joints and at the navicular middle   cuneiform articulation which is nonspecific and could be related to   infection or inflammation.    Mild signal abnormality at the distal margin of the remaining first   proximal phalanx. Thisfinding is nonspecific but could represent   osteomyelitis in the appropriate clinical setting; correlate for signs of   soft tissue infection this location.    < end of copied text >

## 2023-01-24 NOTE — PROGRESS NOTE ADULT - ASSESSMENT
67M with right foot open partial 5th ray resection wound to bone and cellulitis.  - Pt seen and evaluated.  - Afebrile, WBC 7.42, , CRP 33.8.  - Right foot partial 5th ray amputation site open to bone, fibrogranular, graft incorporating, mild serous drainage, erythema to the level of the ankle resolving, excess warmth noted to the global foot resolving. Left foot no wounds, no clinical signs of infection.   - Right foot wound culture: Klebsiella pneumoniae, Strep agalactiae, Group B resistant Strep.  - 12/27 LETICIA/PVR: ABIs noncompressible b/l, waveforms normal - no arterial disease.  - Right foot MRI reviewd.  - Pod plan: Booked for right foot transmetatarsal amputation with tendoachilles lengthening, posterior tibial tenotomy, and graft application tomorrow 1/25 at 7:30am with Dr. Wang.  - Please document medical clearance for podiatric surgery under anesthesia.  - NPO at 11:59pm tonight.  - AM labs ordered including CBC, BMP, coags, and type and screen.  - STAT COVID PCR ordered.  - Seen with attending.

## 2023-01-24 NOTE — PROGRESS NOTE ADULT - ATTENDING COMMENTS
Liver transplant with recurrent LE infections and OM  Recurrence of infection at R foot partial 5th ray amputation  Also with associated GBS Bacteremia  Would continue Zosyn pending susceptibilities of Klebsiella on wound cultures  Would stop Valcyte; two negative CMV PCR's    I will continue to follow. Please feel free to contact me with any further questions.    Eusebio Pérez M.D.  [facility] Division of Infectious Disease  8AM-5PM Monday - Friday: Available on [employer]  After Hours and Holidays (or if no response on [employer] Teams): Please contact the Infectious Diseases Office at [phone] Liver transplant recipient with recurrent LE infections and OM  Recurrence of infection at R foot partial 5th ray amputation  Also with associated GBS Bacteremia  Would continue Zosyn pending susceptibilities of Klebsiella on wound cultures  Would stop Valcyte; two negative CMV PCR's    I will continue to follow. Please feel free to contact me with any further questions.    Eusebio Pérez M.D.  [facility] Division of Infectious Disease  8AM-5PM Monday - Friday: Available on [employer]  After Hours and Holidays (or if no response on [employer] Teams): Please contact the Infectious Diseases Office at [phone]

## 2023-01-24 NOTE — PROGRESS NOTE ADULT - PROBLEM SELECTOR PLAN 6
Pt with 5 day history of diarrhea (>10x/day), previous elevated CMV though pt currently on acycolvir. No further episodes since 1/22 AM  -f/u GI PCR  -likely cause of metabolic acidosis Pt admitted with hyponatremia of 130 i/s/o increasing pain of RLE as well as MISA on CKD. Lower end of normal w/ correction given hyperglycemia of 342  -f/u AM BMP to trend Na  -can f/u with urine lytes should Na downtrend

## 2023-01-25 NOTE — DISCHARGE NOTE PROVIDER - NSDCCPTREATMENT_GEN_ALL_CORE_FT
PRINCIPAL PROCEDURE  Procedure: Transmetatarsal amputation  Findings and Treatment: Operative Findings:  · Operative Findings  s/p R foot Transmetatarsal Amputation with Tendo Achilles lengthening and Fifth Ray Resection with Posterior Tibial Tenotomy : at proximal resection bone was of good quality, no evidence of purulence or infection tracking proximally. Incision primarily closed medial and distal, lateral open with Keracis graft  Specimens/Blood Loss/IV/Output/Protocol/VTE:   Specimens/Blood Loss/IV/Output/Protocol/VTE:  · Specimens  Micro: 1- Clean bone 1st met. Path 1- Clean bone 1st met, 2- Nonviable soft tissue skin and bone  · Estimated Blood Loss  100 milliLiter(s)  · Antibiotic Protocol  Followed protocol  · Venous Thromboembolism Prophylaxis Therapy  SCD L  Other Details/Condition Post op/Disposition:  · Comments/Other Details  Pt is s/p R foot Transmetatarsal Amputation with Tendo Achilles lengthening and Fifth Ray Resection with Posterior Tibial Tenotomy with Keracis   - Low concern for residual bone infection, bone was hard at level of resection and appeared vitalized intra-op  - High concern for viability, inadequate intro-op bleeding.   - Plan: VAC on Friday  -  1 prefabricated AFO prescribed as it is used post op to promote healing and protection of the Achilles tendon lengthening.  · Condition Post op  stable

## 2023-01-25 NOTE — DISCHARGE NOTE PROVIDER - HOSPITAL COURSE
Discharge Summary     Admission diagnoses:   Osteomyelitis    Discharge diagnoses:   Osteomyelitis  Diabetes Mellitius with Microvascular disease    Hospital Course:   For full details, please see H&P, progress notes, consult notes, and ancillary notes. 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 Found to be febrile and tachycardic on admission w/ MISA on CKD and metabolic acidosis on labs - possible osteomyelitic changes on imaging. Admitted to medicine for further management.  Patient s/p TMA on 1/25 and bone biopsy demonstrated _____. Patient was treated with _____ days of IV antibiotics (ceftriaxone and flagyll based on sensitivies) and then transitioned to ______ per Transplant ID reccomendations.     During this hopsital course, patient's insulin regimen was also uptitrated based on sliding scale requirements. Patient's final insulin regimen was ____.     Patient also found to have pre-renal MISA on CKD which resolved with fluids and increased PO intake.     On day of discharge, patient is clinically stable with no new exam findings or acute symptoms compared to prior. The patient was seen by the attending physician on the date of discharge and deemed stable and acceptable for discharge. The patient's chronic medical conditions were treated accordingly per the patient's home medication regimen. The patient's medication reconciliation, follow up appointments, discharge instructions, and significant lab and diagnostic studies are as noted.     Discharge follow up action items:     1. Follow up with PCP, Podiatry, Transplant ID  2. Follow up labs, path, & imaging ***  3. Medication changes ***    Patient's ordered code status: Full Code    Patient disposition: [facility]     Discharge Summary     Admission diagnoses:   Osteomyelitis    Discharge diagnoses:   Osteomyelitis  Diabetes Mellitius with Microvascular disease    Hospital Course:   For full details, please see H&P, progress notes, consult notes, and ancillary notes. 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 Found to be febrile and tachycardic on admission w/ MISA on CKD and metabolic acidosis on labs - possible osteomyelitic changes on imaging. Admitted to medicine for further management.  Patient s/p TMA on 1/25 and bone biopsy demonstrated no organisms. Patient was treated with seven  days of IV antibiotics (Zosyn --> ceftriaxone and flagyll based on sensitivies) and then transitioned to cefpodoxime and metronidazole per Transplant ID recommendations.    During this hopsital course, patient's insulin regimen was also uptitrated based on sliding scale requirements. Patient's final insulin regimen was 26U Lantus QHS and Admelog 8U premeal.  Patient also found to have pre-renal MISA on CKD which resolved with fluids and increased PO intake.   On day of discharge, patient is clinically stable with no new exam findings or acute symptoms compared to prior. The patient was seen by the attending physician on the date of discharge and deemed stable and acceptable for discharge to [facility]. The patient's chronic medical conditions were treated accordingly per the patient's home medication regimen. The patient's medication reconciliation, follow up appointments, discharge instructions, and significant lab and diagnostic studies are as noted.     Discharge follow up action items:   1. Follow up with PCP, Podiatry, Transplant ID    Patient's ordered code status: Full Code  Patient disposition: [facility]

## 2023-01-25 NOTE — PROGRESS NOTE ADULT - ASSESSMENT
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 Found to be febrile and tachycardic on admission w/ MISA on CKD and metabolic acidosis on labs - possible osteomyelitic changes on imaging. Admitted to medicine for further management.  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 Found to be febrile and tachycardic on admission w/ MISA on CKD and metabolic acidosis on labs - possible osteomyelitic changes on imaging. Admitted to medicine for further management.  Patient gone forTMA by podiatry on 1/25 and possible bone biopsy for Osteomyelitis

## 2023-01-25 NOTE — DISCHARGE NOTE PROVIDER - NSDCCPCAREPLAN_GEN_ALL_CORE_FT
PRINCIPAL DISCHARGE DIAGNOSIS  Diagnosis: Osteomyelitis of ankle or foot, acute  Assessment and Plan of Treatment: You came to the hospital after a fall due to pain in your right foot. You were found to have a bone infection in the right foot likely due to poorly controlled diabetes and vascular disease. You underwent a procedure with the podiatrists to remove the infected bone, which went well. You were treated with IV antibiotics for ____ weeks.   Please be sure to followup with the podiatrist, primary care doctor, infectious disease doctor and endocrinologist.   Please be sure to take your insulin as prescribed and see your doctor every 3 months for diabetes care. While you were here, we increased your insulin regimen as you were requiring a lot of insulin. Please make sure you were shoes everywhere as you have no sensation in your feet and will not be able to tell if you have stepped on something. This can lead to further infections.   If you develop fevers, chills, recurrent foot pain, please return to the emergency room.       PRINCIPAL DISCHARGE DIAGNOSIS  Diagnosis: Osteomyelitis of ankle or foot, acute  Assessment and Plan of Treatment: You came to the hospital after a fall due to pain in your right foot. You were found to have a bone infection in the right foot likely due to poorly controlled diabetes and vascular disease. You underwent a procedure with the podiatrists to remove the infected bone, which went well. You were treated with IV and oral antibiotics. Please be sure to followup with the podiatrist, primary care doctor, infectious disease doctor and endocrinologist.   Please be sure to take your insulin as prescribed and see your doctor every 3 months for diabetes care. While you were here, we increased your insulin regimen as you were requiring a lot of insulin. Please make sure you were shoes everywhere as you have no sensation in your feet and will not be able to tell if you have stepped on something. This can lead to further infections.   If you develop fevers, chills, recurrent foot pain, please return to the emergency room.

## 2023-01-25 NOTE — PROGRESS NOTE ADULT - PROBLEM SELECTOR PLAN 7
Pt with 5 day history of diarrhea (>10x/day), previous elevated CMV though pt currently on acycolvir. No further episodes since 1/24 AM  -f/u GI PCR  -likely cause of metabolic acidosis

## 2023-01-25 NOTE — PROGRESS NOTE ADULT - PROBLEM SELECTOR PLAN 8
On lantus 14u nightly, admelog 9u TID  -c/w lantus 14u nightly, low ISS  -adjust insulin per SS requirements  -reduced lantus to 10 u prior to OR and NPO

## 2023-01-25 NOTE — PROGRESS NOTE ADULT - PROBLEM SELECTOR PLAN 1
Pt with fever of 102.3, tachycardia of 101, likely R pedal source, MISA on CKD and metabolic/lactic acidosis meet criteria for severe sepsis. Pt normotensive at this time. Of note, pt on immunosuppression of prednisone, MMF for hepatic transplant. Hx of VRE bacteremia, CMV viremia  -Blood culture unclear if patient's was positive per lab  -Pt received vanc/cefepime x1 in ED  -C/w broad spectrum antibiotics of zosyn (discontinued vanc per ID recs)  -Resuscitation w/ IVF as needed (500cc bolus ordered)  -Transplant ID consulted; f/u recs  -trend ESR  -f/u Urine culture Pt with fever of 102.3, tachycardia of 101, likely R pedal source, MISA on CKD and metabolic/lactic acidosis meet criteria for severe sepsis. Pt normotensive at this time. Of note, pt on immunosuppression of prednisone, MMF for hepatic transplant. Hx of VRE bacteremia, CMV viremia  -Blood culture unclear if patient's was positive per lab  -Pt received vanc/cefepime x1 in ED  -zosyn from 1/23-1/25; switched to meropenem on 1/25 given sensitivities of wound culture for klebsiella   -Resuscitation w/ IVF as needed (500cc bolus ordered)  -Transplant ID consulted; f/u recs  -trend ESR  -f/u Urine culture

## 2023-01-25 NOTE — PROGRESS NOTE ADULT - PROBLEM SELECTOR PLAN 4
Pt with known CKD, b/l SCr 1.6-1.8 (1.89 on discharge 3 weeks prior to admission 12/30/2022), now with SCr of 2.32 c/f ATN i/s/o possible osteomyelitis  -F/u urine lytes  -F/u AM BMP  -monitor urine output Pt with known CKD, b/l SCr 1.6-1.8 (1.89 on discharge 3 weeks prior to admission 12/30/2022), now with SCr of 2.32 c/f ATN i/s/o possible osteomyelitis  -Urine lytes c/w pre-renal; trial IVF  -F/u AM BMP  -monitor urine output

## 2023-01-25 NOTE — PRE-ANESTHESIA EVALUATION ADULT - NSRADCARDRESULTSFT_GEN_ALL_CORE
< from: TTE with Doppler (w/Cont) (11.04.22 @ 09:13) >    Dimensions:    Normal Values:  LA:     3.9    2.0 - 4.0 cm  Ao:     3.9    2.0 - 3.8 cm  SEPTUM: 1.1    0.6 - 1.2 cm  PWT:    1.0    0.6 - 1.1 cm  LVIDd: 5.6    3.0 - 5.6 cm  LVIDs:  3.9    1.8 - 4.0 cm  Derived variables:  LVMI: 102 g/m2  RWT: 0.35  Fractional short: 30 %  EF (Modified Hoang Rule): 61 %Doppler Peak Velocity  (m/sec): AoV=2.1  ------------------------------------------------------------------------  Observations:  Mitral Valve: Mitral annular calcification, otherwise  normal mitral valve. Minimal mitral regurgitation.  Aortic Valve/Aorta: Calcified aortic valve with decreased  opening. Peak transaortic valve gradient equals 18 mm Hg,  mean transaortic valve gradient equals 10 mm Hg, estimated  aortic valve area equals 1.9 sqcm (by continuity equation),  aortic valve velocity time integral equals 49 cm,  consistent with mild aortic stenosis. No aortic valve  regurgitation seen. Peak left ventricular outflow tract  gradient equals 3 mm Hg, mean gradient is equal to 2 mm Hg,  LVOT velocity time integral equals 23 cm.  Aortic Root: 3.9 cm.  LVOT diameter: 2.6 cm.  Left Atrium: Normal left atrium.  LA volume index = 17  cc/m2.  Left Ventricle:  Endocardial visualization enhanced with  intravenous injection of Ultrasonic Enhancing Agent  (Definity). Normal left ventricular systolic function. No  segmental wall motion abnormalities. No left ventricular  thrombus. Normal left ventricular internal dimensions and  wall thicknesses. Mild diastolic dysfunction (Stage I).  Right Heart: Normal right atrium. Normal right ventricular  size and function. Normal tricuspid valve. Mild tricuspid  regurgitation. Normal pulmonic valve.  Pericardium/Pleura: Normal pericardium with no pericardial  effusion.  Hemodynamic: Estimated right atrial pressure is 8 mm Hg.  Estimated right ventricular systolic pressure equals 24 mm  Hg, assuming right atrial pressure equals 8 mm Hg,  consistent with normal pulmonary pressures. Color Doppler  demonstrates no evidence of a patent foramen ovale.  ------------------------------------------------------------------------  Conclusions:  1. Mitral annular calcification, otherwise normal mitral  valve. Minimal mitral regurgitation.  2. Calcified aortic valve with decreased opening. No aortic  valve regurgitation seen.  3.  Endocardial visualization enhanced with intravenous  injection of Ultrasonic Enhancing Agent (Definity). Normal  left ventricular systolic function. No segmental wall  motion abnormalities. No left ventricular thrombus.  4. Mild diastolic dysfunction (Stage I).  5. Normal right ventricular size and function.  *** Compared with echocardiogram of 12/21/2021, no  significant changes noted.  ------------------------------------------------------------------------  Confirmed on  11/4/2022 - 13:08:56 by Kodak Le M.D.  ------------------------------------------------------------------------

## 2023-01-25 NOTE — PROGRESS NOTE ADULT - ATTENDING COMMENTS
68 yo M PMH DM, liver tx on immunosuppression a/w cellulitis with suspected OM and sepsis with MISA.     #Sepsis  #NAGMA - poss 2/2 diarrhea  #MISA on CKD stage 3 - likely ATN from sepsis, with pre-renal component from diarrhea  #Anemia of chronic disease     - GI PCR neg, diarrhea resolved. GI c/s appreciated  - Wound cx growing kleb resistant to zosyn. abx switched to ceftriaxone based on sensitivities, ID following   - bcx 1/22 with GBS but mislabeled bottles. Repeat bcx 1/24 ntd but sent while on abx   - OR today with podiatry for right foot transmetatarsal amputation. f/u OR cultures   - Cr uptrended, IVF     Rest as above. 68 yo M PMH DM, liver tx on immunosuppression a/w cellulitis with suspected OM and sepsis with MISA.     #Sepsis  #NAGMA - poss 2/2 diarrhea  #MISA on CKD stage 3 - likely ATN from sepsis, with pre-renal component from diarrhea  #Anemia of chronic disease     - GI PCR neg, diarrhea resolved. GI c/s appreciated  - Wound cx growing kleb resistant to zosyn. abx switched to ceftriaxone + flagyl based on sensitivities, ID following   - bcx 1/22 with GBS but mislabeled bottles. Repeat bcx 1/24 ntd but sent while on abx   - OR today with podiatry for right foot transmetatarsal amputation. f/u OR cultures   - Cr uptrended, IVF     Rest as above.

## 2023-01-25 NOTE — PROGRESS NOTE ADULT - SUBJECTIVE AND OBJECTIVE BOX
Follow Up:      Interval History/ROS: Patient is a 67y old  Male who presents with a chief complaint of cellulitis vs osteomyelitis.  ID following for R foot OM.  Seen at bedside, ***     REVIEW OF SYSTEMS  [  ] ROS unobtainable because:    [ x ] All other systems negative except as noted below    Constitutional:  [ ] fever [ ] chills  [ ] weight loss  [ ]night sweat  [ ]poor appetite/PO intake [ ]fatigue   Skin:  [ ] rash [ ] phlebitis	  Eyes: [ ] icterus [ ] pain  [ ] discharge	  ENMT: [ ] sore throat  [ ] thrush [ ] ulcers [ ] exudates [ ]anosmia  Respiratory: [ ] dyspnea [ ] hemoptysis [ ] cough [ ] sputum	  Cardiovascular:  [ ] chest pain [ ] palpitations [ ] edema	  Gastrointestinal:  [ ] nausea [ ] vomiting [ ] diarrhea [ ] constipation [ ] pain	  Genitourinary:  [ ] dysuria [ ] frequency [ ] hematuria [ ] discharge [ ] flank pain  [ ] incontinence  Musculoskeletal:  [ ] myalgias [ ] arthralgias [ ] arthritis  [ ] back pain  Neurological:  [ ] headache [ ] weakness [ ] seizures  [ ] confusion/altered mental status    Allergies  codeine (Anaphylaxis)    ANTIMICROBIALS:    cefTRIAXone   IVPB 2000 every 24 hours  meropenem  IVPB 1000 every 12 hours  metroNIDAZOLE    Tablet 500 two times a day    OTHER MEDS: MEDICATIONS  (STANDING):  acetaminophen     Tablet .. 650 every 6 hours PRN  aluminum hydroxide/magnesium hydroxide/simethicone Suspension 30 every 4 hours PRN  dextrose 50% Injectable 25 once  dextrose 50% Injectable 12.5 once  dextrose 50% Injectable 25 once  dextrose Oral Gel 15 once PRN  glucagon  Injectable 1 once  insulin glargine Injectable (LANTUS) 10 at bedtime  insulin lispro (ADMELOG) corrective regimen sliding scale  three times a day before meals  insulin lispro (ADMELOG) corrective regimen sliding scale  at bedtime  melatonin 3 at bedtime PRN  mycophenolate mofetil 500 two times a day  ondansetron Injectable 4 every 8 hours PRN  pantoprazole    Tablet 40 before breakfast  predniSONE   Tablet 20 daily  senna 2 at bedtime PRN    Vital Signs Last 24 Hrs  T(F): 97.5 (01-25-23 @ 11:00), Max: 102.3 (01-22-23 @ 21:05)    Vital Signs Last 24 Hrs  HR: 54 (01-25-23 @ 11:30) (53 - 73)  BP: 115/61 (01-25-23 @ 11:30) (102/59 - 131/64)  RR: 16 (01-25-23 @ 11:30)  SpO2: 96% (01-25-23 @ 11:30) (95% - 100%)  Wt(kg): --    EXAM:    GA: NAD  HEENT: oral cavity no lesion  CV: nl S1/S2, no RMG  Lungs: CTAB, no distress  Abd: BS+, soft, nontender, no rebounding pain  Ext: no edema  Neuro: No focal deficits  Skin: Intact  IV: no phlebitis    Labs:                        9.2    7.25  )-----------( 393      ( 25 Jan 2023 07:17 )             29.1     01-25    136  |  103  |  48<H>  ----------------------------<  153<H>  4.1   |  18<L>  |  2.46<H>    Ca    9.4      25 Jan 2023 07:17  Phos  2.9     01-25  Mg     2.0     01-25    TPro  6.5  /  Alb  2.6<L>  /  TBili  0.2  /  DBili  x   /  AST  25  /  ALT  32  /  AlkPhos  168<H>  01-25    WBC Trend:  WBC Count: 7.25 (01-25-23 @ 07:17)  WBC Count: 7.42 (01-24-23 @ 06:59)  WBC Count: 7.25 (01-23-23 @ 12:45)  WBC Count: 9.53 (01-22-23 @ 17:10)    Creatine Trend:  Creatinine, Serum: 2.46 (01-25)  Creatinine, Serum: 2.32 (01-24)  Creatinine, Serum: 2.32 (01-23)  Creatinine, Serum: 2.18 (01-22)    Liver Biochemical Testing Trend:  Alanine Aminotransferase (ALT/SGPT): 32 (01-25)  Alanine Aminotransferase (ALT/SGPT): 31 (01-24)  Alanine Aminotransferase (ALT/SGPT): 31 (01-23)  Alanine Aminotransferase (ALT/SGPT): 45 (01-22)  Alanine Aminotransferase (ALT/SGPT): 22 (12-27)  Aspartate Aminotransferase (AST/SGOT): 25 (01-25-23 @ 07:17)  Aspartate Aminotransferase (AST/SGOT): 20 (01-24-23 @ 06:59)  Aspartate Aminotransferase (AST/SGOT): 22 (01-23-23 @ 12:45)  Aspartate Aminotransferase (AST/SGOT): 29 (01-22-23 @ 17:10)  Aspartate Aminotransferase (AST/SGOT): 15 (12-27-22 @ 10:09)  Bilirubin Total, Serum: 0.2 (01-25)  Bilirubin Total, Serum: 0.2 (01-24)  Bilirubin Total, Serum: 0.4 (01-23)  Bilirubin Total, Serum: 0.7 (01-22)  Bilirubin Total, Serum: 0.2 (12-28)    Trend LDH  12-26-21 @ 07:31  260<H>  12-20-21 @ 12:42  334<H>  06-13-20 @ 07:29  164  06-11-20 @ 11:33  281<H>  04-29-20 @ 07:11  199    MICROBIOLOGY:    MRSA PCR Result.: NotDetec (12-27-22 @ 10:10)  MRSA PCR Result.: NotDetec (08-30-22 @ 11:26)  MRSA PCR Result.: NotDetec (06-29-22 @ 06:24)    Culture - Blood (collected 24 Jan 2023 07:09)  Source: .Blood Blood-Peripheral  Preliminary Report:    No growth to date.    Culture - Blood (collected 24 Jan 2023 07:09)  Source: .Blood Blood-Peripheral  Preliminary Report:    No growth to date.    Culture - Urine (collected 22 Jan 2023 21:11)  Source: Clean Catch Clean Catch (Midstream)  Final Report:    <10,000 CFU/mL Normal Urogenital Shantel    Culture - Abscess with Gram Stain (collected 22 Jan 2023 18:56)  Source: .Abscess Right foot  Final Report:    Few Klebsiella pneumoniae    Few Bacteroides thetaiotamcron group "Susceptibilities not performed"    Few Actinomyces odontolyticus "Susceptibilities not performed"    Few Streptococcus mitis/oralis group "Susceptibilities not performed"    Numerous Streptococcus agalactiae (Group B) isolated    Group B streptococci are susceptible to ampicillin,    penicillin and cefazolin, but may be resistant to    erythromycin and clindamycin.    Recommendations for intrapartum prophylaxis for Group B    streptococci are penicillin or ampicillin.  Organism: Klebsiella pneumoniae  Organism: Klebsiella pneumoniae    Sensitivities:      -  Amikacin: S <=16      -  Amoxicillin/Clavulanic Acid: I 16/8      -  Ampicillin: R >16 These ampicillin results predict results for amoxicillin      -  Ampicillin/Sulbactam: R >16/8 Enterobacter, Klebsiella aerogenes, Citrobacter, and Serratia may develop resistance during prolonged therapy (3-4 days)      -  Aztreonam: S <=4      -  Cefazolin: I 4 Enterobacter, Klebsiella aerogenes, Citrobacter, and Serratia may develop resistance during prolonged therapy (3-4 days)      -  Cefepime: S <=2      -  Cefoxitin: S <=8      -  Ceftriaxone: S <=1 Enterobacter, Klebsiella aerogenes, Citrobacter, and Serratia may develop resistance during prolonged therapy      -  Ciprofloxacin: S <=0.25      -  Ertapenem: S <=0.5      -  Gentamicin: S <=2      -  Imipenem: S <=1      -  Levofloxacin: S <=0.5      -  Meropenem: S <=1      -  Piperacillin/Tazobactam: R >64      -  Tobramycin: S <=2      -  Trimethoprim/Sulfamethoxazole: S <=0.5/9.5      Method Type: LEONARDO    Culture - Blood (collected 22 Jan 2023 17:10)  Source: .Blood Blood-Peripheral  Final Report:    **Please Note**: This is a Corrected Report**    Please disregard result, Bactec bottles mislabeled.    Previously reported as:    Growth in aerobic bottle: Gram positive cocci in pairs    ***Blood Panel PCR results on this specimen are available    approximately 3 hours after the Gram stain result.***    Gram stain, PCR, and/or culture results may not always    correspond due to difference in methodologies.    ************************************************************    This PCR assay was performed by multiplex PCR. This    Assay tests for 66 bacterial and resistance gene targets.    Please refer to the [facility] test directory    at [email or web address] for details.  Organism: Blood Culture PCR  Organism: Blood Culture PCR    Sensitivities:      -  Streptococcus agalactiae (Group B): Detec      Method Type: PCR    Culture - Blood (collected 22 Jan 2023 17:00)  Source: .Blood Blood-Peripheral  Final Report:    **Please Note**: This is a Corrected Report**    Please disregard report, Bactec bottles are mislabeled.    Previously reported as:    Growth in aerobic bottle: Gram Positive Cocci in Clusters    ***Blood Panel PCR results on this specimen are available    approximately 3 hours after the Gram stain result.***    Gram stain, PCR, and/or culture results may not always    correspond due to difference in methodologies.    ************************************************************    This PCR assay was performed byYorderltiplex PCR. This    Assay tests for 66 bacterial and resistance gene targets.    Please refer to the [facility] test directory    at [email or web address] for details.  Organism: Blood Culture PCR  Organism: Blood Culture PCR    Sensitivities:      -  Coagulase negative Staphylococcus: Detec      Method Type: PCR    Culture - Tissue with Gram Stain (collected 29 Dec 2022 13:56)  Source: .Tissue right foot 5th metatarsal  Final Report:    No growth at 5 days    Culture - Abscess with Gram Stain (collected 26 Dec 2022 22:03)  Source: .Abscess right foot  Final Report:    Rare Coag Negative Staphylococcus    Few Corynebacterium species    "Susceptibilities not performed"    Culture - Blood (collected 26 Dec 2022 19:55)  Source: .Blood Blood  Final Report:    No Growth Final    Culture - Blood (collected 26 Dec 2022 19:45)  Source: .Blood Blood  Final Report:    No Growth Final    Cytomegalovirus By PCR:   NotDetec (01-23-23 @ 03:49)    COVID-19 PCR: NotDetec (01-24-23 @ 10:45)  COVID-19 PCR: NotDetec (12-28-22 @ 12:07)    Procalcitonin, Serum: 7.01 (01-23)    C-Reactive Protein, Serum: 338 (01-22)    Ferritin, Serum: 901 (01-25)    Blood Gas Venous - Lactate: 1.0 (01-23 @ 03:50)  Blood Gas Venous - Lactate: 0.9 (01-23 @ 03:50)  Blood Gas Venous - Lactate: 1.4 (01-22 @ 21:58)  Blood Gas Venous - Lactate: 2.9 (01-22 @ 16:30)    Sedimentation Rate, Erythrocyte: 108 mm/hr (01-24-23 @ 06:59)  Sedimentation Rate, Erythrocyte: 102 mm/hr (01-23-23 @ 03:49)  Sedimentation Rate, Erythrocyte: 120 mm/hr (01-22-23 @ 17:10)  Sedimentation Rate, Erythrocyte: 99 mm/hr (12-26-22 @ 20:08)  Sedimentation Rate, Erythrocyte: 90 mm/hr (11-03-22 @ 21:51)  Sedimentation Rate, Erythrocyte: 106 mm/hr (08-30-22 @ 04:09)  Sedimentation Rate, Erythrocyte: 35 mm/hr (06-19-22 @ 12:19)    RADIOLOGY:  imaging below personally reviewed                     Follow Up:      Interval History/ROS: Patient is a 67y old  Male who presents with a chief complaint of cellulitis vs osteomyelitis.  ID following for R foot OM.  Seen at bedside, underwent R TMA in am, complaining of pain at surgical site.    REVIEW OF SYSTEMS  [  ] ROS unobtainable because:    [ x ] All other systems negative except as noted below    Constitutional:  [ ] fever [ ] chills  [ ] weight loss  [ ]night sweat  [ ]poor appetite/PO intake [ ]fatigue   Skin:  [ ] rash [ ] phlebitis	  Eyes: [ ] icterus [ ] pain  [ ] discharge	  ENMT: [ ] sore throat  [ ] thrush [ ] ulcers [ ] exudates [ ]anosmia  Respiratory: [ ] dyspnea [ ] hemoptysis [ ] cough [ ] sputum	  Cardiovascular:  [ ] chest pain [ ] palpitations [ ] edema	  Gastrointestinal:  [ ] nausea [ ] vomiting [ ] diarrhea [ ] constipation [ ] pain	  Genitourinary:  [ ] dysuria [ ] frequency [ ] hematuria [ ] discharge [ ] flank pain  [ ] incontinence  Musculoskeletal:  [ ] myalgias [ ] arthralgias [ ] arthritis  [ ] back pain [x] R foot pain   Neurological:  [ ] headache [ ] weakness [ ] seizures  [ ] confusion/altered mental status    Allergies  codeine (Anaphylaxis)    ANTIMICROBIALS:    cefTRIAXone   IVPB 2000 every 24 hours  meropenem  IVPB 1000 every 12 hours  metroNIDAZOLE    Tablet 500 two times a day    OTHER MEDS: MEDICATIONS  (STANDING):  acetaminophen     Tablet .. 650 every 6 hours PRN  aluminum hydroxide/magnesium hydroxide/simethicone Suspension 30 every 4 hours PRN  dextrose 50% Injectable 25 once  dextrose 50% Injectable 12.5 once  dextrose 50% Injectable 25 once  dextrose Oral Gel 15 once PRN  glucagon  Injectable 1 once  insulin glargine Injectable (LANTUS) 10 at bedtime  insulin lispro (ADMELOG) corrective regimen sliding scale  three times a day before meals  insulin lispro (ADMELOG) corrective regimen sliding scale  at bedtime  melatonin 3 at bedtime PRN  mycophenolate mofetil 500 two times a day  ondansetron Injectable 4 every 8 hours PRN  pantoprazole    Tablet 40 before breakfast  predniSONE   Tablet 20 daily  senna 2 at bedtime PRN    Vital Signs Last 24 Hrs  T(F): 97.5 (01-25-23 @ 11:00), Max: 102.3 (01-22-23 @ 21:05)    Vital Signs Last 24 Hrs  HR: 54 (01-25-23 @ 11:30) (53 - 73)  BP: 115/61 (01-25-23 @ 11:30) (102/59 - 131/64)  RR: 16 (01-25-23 @ 11:30)  SpO2: 96% (01-25-23 @ 11:30) (95% - 100%)  Wt(kg): --    EXAM:    GA: NAD  HEENT: oral cavity no lesion  CV: nl S1/S2, no RMG  Lungs: CTAB, no distress  Abd: BS+, soft, nontender, no rebounding pain  Ext:s/p  R TMA covered with dressing  Neuro: No focal deficits  Skin: Intact  IV: no phlebitis    Labs:                        9.2    7.25  )-----------( 393      ( 25 Jan 2023 07:17 )             29.1     01-25    136  |  103  |  48<H>  ----------------------------<  153<H>  4.1   |  18<L>  |  2.46<H>    Ca    9.4      25 Jan 2023 07:17  Phos  2.9     01-25  Mg     2.0     01-25    TPro  6.5  /  Alb  2.6<L>  /  TBili  0.2  /  DBili  x   /  AST  25  /  ALT  32  /  AlkPhos  168<H>  01-25    WBC Trend:  WBC Count: 7.25 (01-25-23 @ 07:17)  WBC Count: 7.42 (01-24-23 @ 06:59)  WBC Count: 7.25 (01-23-23 @ 12:45)  WBC Count: 9.53 (01-22-23 @ 17:10)    Creatine Trend:  Creatinine, Serum: 2.46 (01-25)  Creatinine, Serum: 2.32 (01-24)  Creatinine, Serum: 2.32 (01-23)  Creatinine, Serum: 2.18 (01-22)    Liver Biochemical Testing Trend:  Alanine Aminotransferase (ALT/SGPT): 32 (01-25)  Alanine Aminotransferase (ALT/SGPT): 31 (01-24)  Alanine Aminotransferase (ALT/SGPT): 31 (01-23)  Alanine Aminotransferase (ALT/SGPT): 45 (01-22)  Alanine Aminotransferase (ALT/SGPT): 22 (12-27)  Aspartate Aminotransferase (AST/SGOT): 25 (01-25-23 @ 07:17)  Aspartate Aminotransferase (AST/SGOT): 20 (01-24-23 @ 06:59)  Aspartate Aminotransferase (AST/SGOT): 22 (01-23-23 @ 12:45)  Aspartate Aminotransferase (AST/SGOT): 29 (01-22-23 @ 17:10)  Aspartate Aminotransferase (AST/SGOT): 15 (12-27-22 @ 10:09)  Bilirubin Total, Serum: 0.2 (01-25)  Bilirubin Total, Serum: 0.2 (01-24)  Bilirubin Total, Serum: 0.4 (01-23)  Bilirubin Total, Serum: 0.7 (01-22)  Bilirubin Total, Serum: 0.2 (12-28)    Trend LDH  12-26-21 @ 07:31  260<H>  12-20-21 @ 12:42  334<H>  06-13-20 @ 07:29  164  06-11-20 @ 11:33  281<H>  04-29-20 @ 07:11  199    MICROBIOLOGY:    MRSA PCR Result.: NotDetec (12-27-22 @ 10:10)  MRSA PCR Result.: NotDetec (08-30-22 @ 11:26)  MRSA PCR Result.: NotDetec (06-29-22 @ 06:24)    Culture - Blood (collected 24 Jan 2023 07:09)  Source: .Blood Blood-Peripheral  Preliminary Report:    No growth to date.    Culture - Blood (collected 24 Jan 2023 07:09)  Source: .Blood Blood-Peripheral  Preliminary Report:    No growth to date.    Culture - Urine (collected 22 Jan 2023 21:11)  Source: Clean Catch Clean Catch (Midstream)  Final Report:    <10,000 CFU/mL Normal Urogenital Shantel    Culture - Abscess with Gram Stain (collected 22 Jan 2023 18:56)  Source: .Abscess Right foot  Final Report:    Few Klebsiella pneumoniae    Few Bacteroides thetaiotamcron group "Susceptibilities not performed"    Few Actinomyces odontolyticus "Susceptibilities not performed"    Few Streptococcus mitis/oralis group "Susceptibilities not performed"    Numerous Streptococcus agalactiae (Group B) isolated    Group B streptococci are susceptible to ampicillin,    penicillin and cefazolin, but may be resistant to    erythromycin and clindamycin.    Recommendations for intrapartum prophylaxis for Group B    streptococci are penicillin or ampicillin.  Organism: Klebsiella pneumoniae  Organism: Klebsiella pneumoniae    Sensitivities:      -  Amikacin: S <=16      -  Amoxicillin/Clavulanic Acid: I 16/8      -  Ampicillin: R >16 These ampicillin results predict results for amoxicillin      -  Ampicillin/Sulbactam: R >16/8 Enterobacter, Klebsiella aerogenes, Citrobacter, and Serratia may develop resistance during prolonged therapy (3-4 days)      -  Aztreonam: S <=4      -  Cefazolin: I 4 Enterobacter, Klebsiella aerogenes, Citrobacter, and Serratia may develop resistance during prolonged therapy (3-4 days)      -  Cefepime: S <=2      -  Cefoxitin: S <=8      -  Ceftriaxone: S <=1 Enterobacter, Klebsiella aerogenes, Citrobacter, and Serratia may develop resistance during prolonged therapy      -  Ciprofloxacin: S <=0.25      -  Ertapenem: S <=0.5      -  Gentamicin: S <=2      -  Imipenem: S <=1      -  Levofloxacin: S <=0.5      -  Meropenem: S <=1      -  Piperacillin/Tazobactam: R >64      -  Tobramycin: S <=2      -  Trimethoprim/Sulfamethoxazole: S <=0.5/9.5      Method Type: LEONARDO    Culture - Blood (collected 22 Jan 2023 17:10)  Source: .Blood Blood-Peripheral  Final Report:    **Please Note**: This is a Corrected Report**    Please disregard result, Bactec bottles mislabeled.    Previously reported as:    Growth in aerobic bottle: Gram positive cocci in pairs    ***Blood Panel PCR results on this specimen are available    approximately 3 hours after the Gram stain result.***    Gram stain, PCR, and/or culture results may not always    correspond due to difference in methodologies.    ************************************************************    This PCR assay was performed by multiplex PCR. This    Assay tests for 66 bacterial and resistance gene targets.    Please refer to the [facility] [employer] (?????) test directory    at [email or web address] for details.  Organism: Blood Culture PCR  Organism: Blood Culture PCR    Sensitivities:      -  Streptococcus agalactiae (Group B): Detec      Method Type: PCR    Culture - Blood (collected 22 Jan 2023 17:00)  Source: .Blood Blood-Peripheral  Final Report:    **Please Note**: This is a Corrected Report**    Please disregard report, Bactec bottles are mislabeled.    Previously reported as:    Growth in aerobic bottle: Gram Positive Cocci in Clusters    ***Blood Panel PCR results on this specimen are available    approximately 3 hours after the Gram stain result.***    Gram stain, PCR, and/or culture results may not always    correspond due to difference in methodologies.    ************************************************************    This PCR assay was performed byJaco Solarsiltiplex PCR. This    Assay tests for 66 bacterial and resistance gene targets.    Please refer to the [facility] test directory    at [email or web address] for details.  Organism: Blood Culture PCR  Organism: Blood Culture PCR    Sensitivities:      -  Coagulase negative Staphylococcus: Detec      Method Type: PCR    Culture - Tissue with Gram Stain (collected 29 Dec 2022 13:56)  Source: .Tissue right foot 5th metatarsal  Final Report:    No growth at 5 days    Culture - Abscess with Gram Stain (collected 26 Dec 2022 22:03)  Source: .Abscess right foot  Final Report:    Rare Coag Negative Staphylococcus    Few Corynebacterium species    "Susceptibilities not performed"    Culture - Blood (collected 26 Dec 2022 19:55)  Source: .Blood Blood  Final Report:    No Growth Final    Culture - Blood (collected 26 Dec 2022 19:45)  Source: .Blood Blood  Final Report:    No Growth Final    Cytomegalovirus By PCR:   NotDetec (01-23-23 @ 03:49)    COVID-19 PCR: NotDetec (01-24-23 @ 10:45)  COVID-19 PCR: NotDetec (12-28-22 @ 12:07)    Procalcitonin, Serum: 7.01 (01-23)    C-Reactive Protein, Serum: 338 (01-22)    Ferritin, Serum: 901 (01-25)    Blood Gas Venous - Lactate: 1.0 (01-23 @ 03:50)  Blood Gas Venous - Lactate: 0.9 (01-23 @ 03:50)  Blood Gas Venous - Lactate: 1.4 (01-22 @ 21:58)  Blood Gas Venous - Lactate: 2.9 (01-22 @ 16:30)    Sedimentation Rate, Erythrocyte: 108 mm/hr (01-24-23 @ 06:59)  Sedimentation Rate, Erythrocyte: 102 mm/hr (01-23-23 @ 03:49)  Sedimentation Rate, Erythrocyte: 120 mm/hr (01-22-23 @ 17:10)  Sedimentation Rate, Erythrocyte: 99 mm/hr (12-26-22 @ 20:08)  Sedimentation Rate, Erythrocyte: 90 mm/hr (11-03-22 @ 21:51)  Sedimentation Rate, Erythrocyte: 106 mm/hr (08-30-22 @ 04:09)  Sedimentation Rate, Erythrocyte: 35 mm/hr (06-19-22 @ 12:19)    RADIOLOGY:  imaging below personally reviewed                     Follow Up:      Interval History/ROS: Patient is a 67y old  Male who presents with a chief complaint of cellulitis vs osteomyelitis.  ID following for R foot OM.  Seen at bedside, underwent R TMA in am, complaining of pain at surgical site.    REVIEW OF SYSTEMS  [  ] ROS unobtainable because:    [ x ] All other systems negative except as noted below    Constitutional:  [ ] fever [ ] chills  [ ] weight loss  [ ]night sweat  [ ]poor appetite/PO intake [ ]fatigue   Skin:  [ ] rash [ ] phlebitis	  Eyes: [ ] icterus [ ] pain  [ ] discharge	  ENMT: [ ] sore throat  [ ] thrush [ ] ulcers [ ] exudates [ ]anosmia  Respiratory: [ ] dyspnea [ ] hemoptysis [ ] cough [ ] sputum	  Cardiovascular:  [ ] chest pain [ ] palpitations [ ] edema	  Gastrointestinal:  [ ] nausea [ ] vomiting [ ] diarrhea [ ] constipation [ ] pain	  Genitourinary:  [ ] dysuria [ ] frequency [ ] hematuria [ ] discharge [ ] flank pain  [ ] incontinence  Musculoskeletal:  [ ] myalgias [ ] arthralgias [ ] arthritis  [ ] back pain [x] R foot pain   Neurological:  [ ] headache [ ] weakness [ ] seizures  [ ] confusion/altered mental status    Allergies  codeine (Anaphylaxis)    ANTIMICROBIALS:    cefTRIAXone   IVPB 2000 every 24 hours  meropenem  IVPB 1000 every 12 hours  metroNIDAZOLE    Tablet 500 two times a day    OTHER MEDS: MEDICATIONS  (STANDING):  acetaminophen     Tablet .. 650 every 6 hours PRN  aluminum hydroxide/magnesium hydroxide/simethicone Suspension 30 every 4 hours PRN  dextrose 50% Injectable 25 once  dextrose 50% Injectable 12.5 once  dextrose 50% Injectable 25 once  dextrose Oral Gel 15 once PRN  glucagon  Injectable 1 once  insulin glargine Injectable (LANTUS) 10 at bedtime  insulin lispro (ADMELOG) corrective regimen sliding scale  three times a day before meals  insulin lispro (ADMELOG) corrective regimen sliding scale  at bedtime  melatonin 3 at bedtime PRN  mycophenolate mofetil 500 two times a day  ondansetron Injectable 4 every 8 hours PRN  pantoprazole    Tablet 40 before breakfast  predniSONE   Tablet 20 daily  senna 2 at bedtime PRN    Vital Signs Last 24 Hrs  T(F): 97.5 (01-25-23 @ 11:00), Max: 102.3 (01-22-23 @ 21:05)    Vital Signs Last 24 Hrs  HR: 54 (01-25-23 @ 11:30) (53 - 73)  BP: 115/61 (01-25-23 @ 11:30) (102/59 - 131/64)  RR: 16 (01-25-23 @ 11:30)  SpO2: 96% (01-25-23 @ 11:30) (95% - 100%)  Wt(kg): --    EXAM:    GA: NAD  HEENT: oral cavity no lesion  CV: nl S1/S2, no RMG  Lungs: CTAB, no distress  Abd: BS+, soft, nontender, no rebounding pain  Ext:s/p  R TMA covered with dressing  Neuro: No focal deficits  Skin: Intact  IV: no phlebitis    Labs:                        9.2    7.25  )-----------( 393      ( 25 Jan 2023 07:17 )             29.1     01-25    136  |  103  |  48<H>  ----------------------------<  153<H>  4.1   |  18<L>  |  2.46<H>    Ca    9.4      25 Jan 2023 07:17  Phos  2.9     01-25  Mg     2.0     01-25    TPro  6.5  /  Alb  2.6<L>  /  TBili  0.2  /  DBili  x   /  AST  25  /  ALT  32  /  AlkPhos  168<H>  01-25    WBC Trend:  WBC Count: 7.25 (01-25-23 @ 07:17)  WBC Count: 7.42 (01-24-23 @ 06:59)  WBC Count: 7.25 (01-23-23 @ 12:45)  WBC Count: 9.53 (01-22-23 @ 17:10)    Creatine Trend:  Creatinine, Serum: 2.46 (01-25)  Creatinine, Serum: 2.32 (01-24)  Creatinine, Serum: 2.32 (01-23)  Creatinine, Serum: 2.18 (01-22)    Liver Biochemical Testing Trend:  Alanine Aminotransferase (ALT/SGPT): 32 (01-25)  Alanine Aminotransferase (ALT/SGPT): 31 (01-24)  Alanine Aminotransferase (ALT/SGPT): 31 (01-23)  Alanine Aminotransferase (ALT/SGPT): 45 (01-22)  Alanine Aminotransferase (ALT/SGPT): 22 (12-27)  Aspartate Aminotransferase (AST/SGOT): 25 (01-25-23 @ 07:17)  Aspartate Aminotransferase (AST/SGOT): 20 (01-24-23 @ 06:59)  Aspartate Aminotransferase (AST/SGOT): 22 (01-23-23 @ 12:45)  Aspartate Aminotransferase (AST/SGOT): 29 (01-22-23 @ 17:10)  Aspartate Aminotransferase (AST/SGOT): 15 (12-27-22 @ 10:09)  Bilirubin Total, Serum: 0.2 (01-25)  Bilirubin Total, Serum: 0.2 (01-24)  Bilirubin Total, Serum: 0.4 (01-23)  Bilirubin Total, Serum: 0.7 (01-22)  Bilirubin Total, Serum: 0.2 (12-28)    Trend LDH  12-26-21 @ 07:31  260<H>  12-20-21 @ 12:42  334<H>  06-13-20 @ 07:29  164  06-11-20 @ 11:33  281<H>  04-29-20 @ 07:11  199    MICROBIOLOGY:    MRSA PCR Result.: NotDetec (12-27-22 @ 10:10)  MRSA PCR Result.: NotDetec (08-30-22 @ 11:26)  MRSA PCR Result.: NotDetec (06-29-22 @ 06:24)    Culture - Blood (collected 24 Jan 2023 07:09)  Source: .Blood Blood-Peripheral  Preliminary Report:    No growth to date.    Culture - Blood (collected 24 Jan 2023 07:09)  Source: .Blood Blood-Peripheral  Preliminary Report:    No growth to date.    Culture - Urine (collected 22 Jan 2023 21:11)  Source: Clean Catch Clean Catch (Midstream)  Final Report:    <10,000 CFU/mL Normal Urogenital Shantel    Culture - Abscess with Gram Stain (collected 22 Jan 2023 18:56)  Source: .Abscess Right foot  Final Report:    Few Klebsiella pneumoniae    Few Bacteroides thetaiotamcron group "Susceptibilities not performed"    Few Actinomyces odontolyticus "Susceptibilities not performed"    Few Streptococcus mitis/oralis group "Susceptibilities not performed"    Numerous Streptococcus agalactiae (Group B) isolated    Group B streptococci are susceptible to ampicillin,    penicillin and cefazolin, but may be resistant to    erythromycin and clindamycin.    Recommendations for intrapartum prophylaxis for Group B    streptococci are penicillin or ampicillin.  Organism: Klebsiella pneumoniae  Organism: Klebsiella pneumoniae    Sensitivities:      -  Amikacin: S <=16      -  Amoxicillin/Clavulanic Acid: I 16/8      -  Ampicillin: R >16 These ampicillin results predict results for amoxicillin      -  Ampicillin/Sulbactam: R >16/8 Enterobacter, Klebsiella aerogenes, Citrobacter, and Serratia may develop resistance during prolonged therapy (3-4 days)      -  Aztreonam: S <=4      -  Cefazolin: I 4 Enterobacter, Klebsiella aerogenes, Citrobacter, and Serratia may develop resistance during prolonged therapy (3-4 days)      -  Cefepime: S <=2      -  Cefoxitin: S <=8      -  Ceftriaxone: S <=1 Enterobacter, Klebsiella aerogenes, Citrobacter, and Serratia may develop resistance during prolonged therapy      -  Ciprofloxacin: S <=0.25      -  Ertapenem: S <=0.5      -  Gentamicin: S <=2      -  Imipenem: S <=1      -  Levofloxacin: S <=0.5      -  Meropenem: S <=1      -  Piperacillin/Tazobactam: R >64      -  Tobramycin: S <=2      -  Trimethoprim/Sulfamethoxazole: S <=0.5/9.5      Method Type: LEONARDO    Culture - Blood (collected 22 Jan 2023 17:10)  Source: .Blood Blood-Peripheral  Final Report:    **Please Note**: This is a Corrected Report**    Please disregard result, Bactec bottles mislabeled.    Previously reported as:    Growth in aerobic bottle: Gram positive cocci in pairs    ***Blood Panel PCR results on this specimen are available    approximately 3 hours after the Gram stain result.***    Gram stain, PCR, and/or culture results may not always    correspond due to difference in methodologies.    ************************************************************    This PCR assay was performed by multiplex PCR. This    Assay tests for 66 bacterial and resistance gene targets.    Please refer to the [facility] LiveIntent test directory    at [email or web address] for details.  Organism: Blood Culture PCR  Organism: Blood Culture PCR    Sensitivities:      -  Streptococcus agalactiae (Group B): Detec      Method Type: PCR    Culture - Blood (collected 22 Jan 2023 17:00)  Source: .Blood Blood-Peripheral  Final Report:    **Please Note**: This is a Corrected Report**    Please disregard report, Bactec bottles are mislabeled.    Previously reported as:    Growth in aerobic bottle: Gram Positive Cocci in Clusters    ***Blood Panel PCR results on this specimen are available    approximately 3 hours after the Gram stain result.***    Gram stain, PCR, and/or culture results may not always    correspond due to difference in methodologies.    ************************************************************    This PCR assay was performed [email or web address] PCR. This    Assay tests for 66 bacterial and resistance gene targets.    Please refer to the [facility] Labs test directory    at [email or web address] for details.  Organism: Blood Culture PCR  Organism: Blood Culture PCR    Sensitivities:      -  Coagulase negative Staphylococcus: Detec      Method Type: PCR    Culture - Tissue with Gram Stain (collected 29 Dec 2022 13:56)  Source: .Tissue right foot 5th metatarsal  Final Report:    No growth at 5 days    Culture - Abscess with Gram Stain (collected 26 Dec 2022 22:03)  Source: .Abscess right foot  Final Report:    Rare Coag Negative Staphylococcus    Few Corynebacterium species    "Susceptibilities not performed"    Culture - Blood (collected 26 Dec 2022 19:55)  Source: .Blood Blood  Final Report:    No Growth Final    Culture - Blood (collected 26 Dec 2022 19:45)  Source: .Blood Blood  Final Report:    No Growth Final    Cytomegalovirus By PCR:   NotDetec (01-23-23 @ 03:49)    COVID-19 PCR: NotDetec (01-24-23 @ 10:45)  COVID-19 PCR: NotDetec (12-28-22 @ 12:07)    Procalcitonin, Serum: 7.01 (01-23)    C-Reactive Protein, Serum: 338 (01-22)    Ferritin, Serum: 901 (01-25)    Blood Gas Venous - Lactate: 1.0 (01-23 @ 03:50)  Blood Gas Venous - Lactate: 0.9 (01-23 @ 03:50)  Blood Gas Venous - Lactate: 1.4 (01-22 @ 21:58)  Blood Gas Venous - Lactate: 2.9 (01-22 @ 16:30)    Sedimentation Rate, Erythrocyte: 108 mm/hr (01-24-23 @ 06:59)  Sedimentation Rate, Erythrocyte: 102 mm/hr (01-23-23 @ 03:49)  Sedimentation Rate, Erythrocyte: 120 mm/hr (01-22-23 @ 17:10)  Sedimentation Rate, Erythrocyte: 99 mm/hr (12-26-22 @ 20:08)  Sedimentation Rate, Erythrocyte: 90 mm/hr (11-03-22 @ 21:51)  Sedimentation Rate, Erythrocyte: 106 mm/hr (08-30-22 @ 04:09)  Sedimentation Rate, Erythrocyte: 35 mm/hr (06-19-22 @ 12:19)    RADIOLOGY:    <The imaging below has been reviewed and visualized by me independently. Findings as detailed in report below>    < from: Xray Foot AP + Lateral + Oblique, Right (01.25.23 @ 10:22) >  IMPRESSION:  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 Elliptical configuration surgical   skin staples over lateral aspect of the forefoot/midfoot related to graft   placement.    Soft tissue lucencies in distal Achilles region apparent on lateral view   probably correlate with concurrently performed LEAH procedure. Otherwise,   no proximally tracking gas collections beyond the amputation margins and   no focal areas of osteomyelitis.    Remainder of foot unchanged. Also correlate with intraoperative findings.    < end of copied text >

## 2023-01-25 NOTE — BRIEF OPERATIVE NOTE - COMMENTS
Pt is s/p R foot Transmetatarsal Amputation with Tendo Achilles lengthening and Fifth Ray Resection with Posterior Tibial Tenotomy with Keracis   - Low concern for residual bone infection, bone was hard at level of resection and appeared vitalized intra-op  - High concern for viability, inadequate intro-op bleeding.   - Plan: VAC on Friday  -  1 prefabricated AFO prescribed as it is used post op to promote healing and protection of the Achilles tendon lengthening.

## 2023-01-25 NOTE — PROGRESS NOTE ADULT - PROBLEM SELECTOR PLAN 2
XR of foot c/f osteomyelitis  -Podiatry consulted in ED, recs appreciated  -Wound Cx pending  -broad spectrum coverage incl. anaerobes w/ vanc (dosed by level)/zosyn  -MRI R foot showed signs of osteomyelitis; pending TMA by podiatry with possible biospy XR of foot c/f osteomyelitis  -Podiatry consulted in ED, recs appreciated  -Wound Cx pending  -broad spectrum coverage incl. anaerobes w/ vanc (dosed by level)/zosyn  -MRI R foot showed signs of osteomyelitis; pending TMA by podiatry with possible biospy on 1/25

## 2023-01-25 NOTE — PROGRESS NOTE ADULT - PROBLEM SELECTOR PLAN 5
Initial VBG showed pH of 7.24, lactate 2.9, pCO2 41, HCO3 19. Repeat 5 hours later showed normalization of pH to 7.33, pCO2 of 36, HCO3 of 19. Possibly due to specimen delivery delay vs multifactorial cause given GI losses, MISA on CKD, infection  -Improving; ctm on daily bmp  -will assess for need for bicarb supplementation

## 2023-01-25 NOTE — PROGRESS NOTE ADULT - ATTENDING COMMENTS
Liver transplant with recurrent LE infections and OM  Valcyte stopped; two negative CMV PCR's  Recurrence of infection at R foot partial 5th ray amputation  OF NOTE: No GBS Bacteremia - this was a micro lab error  Wound culture with polymicrobial growth  s/p RLE TMA with low concern for residual infection  Stop meropenem and switch to Ceftriaxone / Metronidazole    I will continue to follow. Please feel free to contact me with any further questions.    Eusebio Pérez M.D.  [facility] Division of Infectious Disease  8AM-5PM Monday - Friday: Available on [employer] Teams  After Hours and Holidays (or if no response on [employer] Teams): Please contact the Infectious Diseases Office at [phone] Liver transplant recipient with recurrent LE infections and OM  Valcyte stopped; two negative CMV PCR's  Recurrence of infection at R foot partial 5th ray amputation  OF NOTE: No GBS Bacteremia - this was a micro lab error  Wound culture with polymicrobial growth  s/p RLE TMA with low concern for residual infection  Stop meropenem and switch to Ceftriaxone / Metronidazole    I will continue to follow. Please feel free to contact me with any further questions.    Eusebio Pérez M.D.  [facility] Division of Infectious Disease  8AM-5PM Monday - Friday: Available on [employer] Teams  After Hours and Holidays (or if no response on [employer] Teams): Please contact the Infectious Diseases Office at [phone]

## 2023-01-25 NOTE — DISCHARGE NOTE PROVIDER - NSDCFUADDAPPT_GEN_ALL_CORE_FT
Podiatry Discharge Instructions:  - Follow up: Please follow up with Dr. Wang within 1 week of discharge from the hospital, please call [phone] for appointment and discuss that you recently were seen in the hospital.  - Wound Care: Please apply Blackfoam VAC at 75 mmHg 3 times a week continuously.   - Weight bearing: Please non-weight bearing in a CAM boots to right lower extremity .   - Antibiotics: Please continue as instructed.    ========================

## 2023-01-25 NOTE — DISCHARGE NOTE PROVIDER - CARE PROVIDER_API CALL
Eusebio Pérez)  Infectious Disease; Internal Medicine  [address] [place] [address]  Phone: [phone]  Fax: [phone]  Follow Up Time: 2 weeks

## 2023-01-25 NOTE — DISCHARGE NOTE PROVIDER - NSDCFUSCHEDAPPT_GEN_ALL_CORE_FT
Carroll Wang  [facility]  WOUNDCARE [address]  Scheduled Appointment: 01/31/2023    [facility]  HEPATOLOGY [address] [place] [address]   Scheduled Appointment: 02/02/2023    Omaira Simmons  [facility]  [facility]  Scheduled Appointment: 03/10/2023

## 2023-01-25 NOTE — CONSULT NOTE ADULT - SUBJECTIVE AND OBJECTIVE BOX
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 Found to be febrile and tachycardic on admission w/ MISA on CKD and metabolic acidosis on labs - possible osteomyelitic changes on imaging. Admitted to medicine for further management.    s/p OR    Allergies:  codeine (Anaphylaxis)      Medications:  acetaminophen     Tablet .. 650 milliGRAM(s) Oral every 6 hours PRN  aluminum hydroxide/magnesium hydroxide/simethicone Suspension 30 milliLiter(s) Oral every 4 hours PRN  cefTRIAXone   IVPB 2000 milliGRAM(s) IV Intermittent every 24 hours  dextrose 5%. 1000 milliLiter(s) IV Continuous <Continuous>  dextrose 5%. 1000 milliLiter(s) IV Continuous <Continuous>  dextrose 50% Injectable 25 Gram(s) IV Push once  dextrose 50% Injectable 12.5 Gram(s) IV Push once  dextrose 50% Injectable 25 Gram(s) IV Push once  dextrose Oral Gel 15 Gram(s) Oral once PRN  folic acid 1 milliGRAM(s) Oral daily  glucagon  Injectable 1 milliGRAM(s) IntraMuscular once  HYDROmorphone  Injectable 0.5 milliGRAM(s) IV Push every 10 minutes PRN  insulin glargine Injectable (LANTUS) 10 Unit(s) SubCutaneous at bedtime  insulin lispro (ADMELOG) corrective regimen sliding scale   SubCutaneous three times a day before meals  insulin lispro (ADMELOG) corrective regimen sliding scale   SubCutaneous at bedtime  lidocaine   4% Patch 1 Patch Transdermal daily  melatonin 3 milliGRAM(s) Oral at bedtime PRN  meropenem  IVPB 1000 milliGRAM(s) IV Intermittent every 12 hours  metroNIDAZOLE    Tablet 500 milliGRAM(s) Oral two times a day  mycophenolate mofetil 500 milliGRAM(s) Oral two times a day  ondansetron Injectable 4 milliGRAM(s) IV Push once PRN  ondansetron Injectable 4 milliGRAM(s) IV Push every 8 hours PRN  pantoprazole    Tablet 40 milliGRAM(s) Oral before breakfast  predniSONE   Tablet 20 milliGRAM(s) Oral daily  senna 2 Tablet(s) Oral at bedtime PRN      PMHX/PSHX:  Diabetes    Hypercholesteremia    Neuropathy    Hypertension    Renal stones    Fatty liver disease, nonalcoholic    Obesity    Hepatic encephalopathy    GERD with esophagitis    GIB (gastrointestinal bleeding)    No significant past surgical history    S/P cholecystectomy        Family history:  No pertinent family history in first degree relatives    Family history of stomach cancer    Family history of hypertension    Family history of type 2 diabetes mellitus        Social History:     ROS:     General:  No wt loss, fevers, chills, night sweats, fatigue,   Eyes:  Good vision, no reported pain  ENT:  No sore throat, pain, runny nose, dysphagia  CV:  No pain, palpitations, hypo/hypertension  Resp:  No dyspnea, cough, tachypnea, wheezing  GI:  No pain, No nausea, No vomiting, No diarrhea, No constipation, No weight loss, No fever, No pruritis, No rectal bleeding, No tarry stools, No dysphagia,  :  No pain, bleeding, incontinence, nocturia  Muscle:  No pain, weakness  Neuro:  No weakness, tingling, memory problems  Psych:  No fatigue, insomnia, mood problems, depression  Endocrine:  No polyuria, polydipsia, cold/heat intolerance  Heme:  No petechiae, ecchymosis, easy bruisability  Skin:  No rash, tattoos, scars, edema      PHYSICAL EXAM:   Vital Signs:  Vital Signs Last 24 Hrs  T(C): 36.4 (25 Jan 2023 11:00), Max: 37 (24 Jan 2023 13:14)  T(F): 97.5 (25 Jan 2023 11:00), Max: 98.6 (24 Jan 2023 13:14)  HR: 54 (25 Jan 2023 11:30) (53 - 73)  BP: 115/61 (25 Jan 2023 11:30) (102/59 - 131/64)  BP(mean): 85 (25 Jan 2023 11:00) (73 - 87)  RR: 16 (25 Jan 2023 11:30) (16 - 18)  SpO2: 96% (25 Jan 2023 11:30) (95% - 100%)    Parameters below as of 25 Jan 2023 11:00  Patient On (Oxygen Delivery Method): room air      Daily Height in cm: 185.42 (25 Jan 2023 07:17)    Daily     GENERAL:  Appears stated age,   HEENT:  NC/AT,    CHEST:  Full & symmetric excursion,   HEART:  Regular rhythm  ABDOMEN:  Soft, non-tender, non-distended,   EXTEREMITIES:  no cyanosis,clubbing or edema, leg cast in place  SKIN:  No rash  NEURO:  Alert,    LABS:                        9.2    7.25  )-----------( 393      ( 25 Jan 2023 07:17 )             29.1     01-25    136  |  103  |  48<H>  ----------------------------<  153<H>  4.1   |  18<L>  |  2.46<H>    Ca    9.4      25 Jan 2023 07:17  Phos  2.9     01-25  Mg     2.0     01-25    TPro  6.5  /  Alb  2.6<L>  /  TBili  0.2  /  DBili  x   /  AST  25  /  ALT  32  /  AlkPhos  168<H>  01-25    LIVER FUNCTIONS - ( 25 Jan 2023 07:17 )  Alb: 2.6 g/dL / Pro: 6.5 g/dL / ALK PHOS: 168 U/L / ALT: 32 U/L / AST: 25 U/L / GGT: x           PT/INR - ( 25 Jan 2023 07:17 )   PT: 13.2 sec;   INR: 1.15 ratio         PTT - ( 25 Jan 2023 07:17 )  PTT:25.5 sec        Imaging:           Admission

## 2023-01-25 NOTE — BRIEF OPERATIVE NOTE - NSICDXBRIEFPROCEDURE_GEN_ALL_CORE_FT
PROCEDURES:  Amputation, transmetatarsal 25-Jan-2023 09:58:17  Pancho Lamb  Lengthening, Achilles tendon 25-Jan-2023 09:58:31  Pancho Lamb  Tenotomy, tibialis posterior 25-Jan-2023 09:58:40  Pancho Lamb

## 2023-01-25 NOTE — PROGRESS NOTE ADULT - SUBJECTIVE AND OBJECTIVE BOX
INTERVAL HPI/OVERNIGHT EVENTS:    SUBJECTIVE: Patient seen and examined at bedside.    OBJECTIVE:    VITAL SIGNS:  ICU Vital Signs Last 24 Hrs  T(C): 36.8 (24 Jan 2023 21:12), Max: 37 (24 Jan 2023 13:14)  T(F): 98.3 (24 Jan 2023 21:12), Max: 98.6 (24 Jan 2023 13:14)  HR: 65 (24 Jan 2023 21:12) (65 - 73)  BP: 131/64 (24 Jan 2023 21:12) (118/65 - 131/64)  BP(mean): --  ABP: --  ABP(mean): --  RR: 18 (24 Jan 2023 21:12) (18 - 18)  SpO2: 98% (24 Jan 2023 21:12) (96% - 98%)    O2 Parameters below as of 24 Jan 2023 21:12  Patient On (Oxygen Delivery Method): room air              01-24 @ 07:01  -  01-25 @ 07:00  --------------------------------------------------------  IN: 0 mL / OUT: 500 mL / NET: -500 mL      CAPILLARY BLOOD GLUCOSE      POCT Blood Glucose.: 194 mg/dL (25 Jan 2023 06:15)      PHYSICAL EXAM:    General: NAD  HEENT: NC/AT; PERRL, clear conjunctiva  Neck: supple  Respiratory: CTA b/l  Cardiovascular: +S1/S2; RRR  Abdomen: soft, NT/ND; +BS x4  Extremities:  no LE edema  Vascular: WWP, 2+ peripheral pulses b/l;  Skin: normal color and turgor; no rash  Neurological: A&Ox3, move all extremities. CN II-XII intact    MEDICATIONS:  MEDICATIONS  (STANDING):  Dakins Solution - 1/4 Strength 1 Application(s) Topical daily  dextrose 5%. 1000 milliLiter(s) (100 mL/Hr) IV Continuous <Continuous>  dextrose 5%. 1000 milliLiter(s) (50 mL/Hr) IV Continuous <Continuous>  dextrose 50% Injectable 25 Gram(s) IV Push once  dextrose 50% Injectable 12.5 Gram(s) IV Push once  dextrose 50% Injectable 25 Gram(s) IV Push once  diclofenac sodium 1% Gel 4 Gram(s) Topical four times a day  folic acid 1 milliGRAM(s) Oral daily  glucagon  Injectable 1 milliGRAM(s) IntraMuscular once  insulin glargine Injectable (LANTUS) 10 Unit(s) SubCutaneous at bedtime  insulin lispro (ADMELOG) corrective regimen sliding scale   SubCutaneous three times a day before meals  insulin lispro (ADMELOG) corrective regimen sliding scale   SubCutaneous at bedtime  lidocaine   4% Patch 1 Patch Transdermal daily  meropenem  IVPB 1000 milliGRAM(s) IV Intermittent every 12 hours  mycophenolate mofetil 500 milliGRAM(s) Oral two times a day  pantoprazole    Tablet 40 milliGRAM(s) Oral before breakfast  predniSONE   Tablet 20 milliGRAM(s) Oral daily    MEDICATIONS  (PRN):  acetaminophen     Tablet .. 650 milliGRAM(s) Oral every 6 hours PRN Temp greater or equal to 38C (100.4F), Mild Pain (1 - 3)  aluminum hydroxide/magnesium hydroxide/simethicone Suspension 30 milliLiter(s) Oral every 4 hours PRN Dyspepsia  dextrose Oral Gel 15 Gram(s) Oral once PRN Blood Glucose LESS THAN 70 milliGRAM(s)/deciliter  melatonin 3 milliGRAM(s) Oral at bedtime PRN Insomnia  ondansetron Injectable 4 milliGRAM(s) IV Push every 8 hours PRN Nausea and/or Vomiting  senna 2 Tablet(s) Oral at bedtime PRN Constipation      ALLERGIES:  Allergies    codeine (Anaphylaxis)    Intolerances        LABS:                        8.9    7.42  )-----------( 363      ( 24 Jan 2023 06:59 )             28.6     01-24    134<L>  |  102  |  45<H>  ----------------------------<  218<H>  3.6   |  19<L>  |  2.32<H>    Ca    9.1      24 Jan 2023 06:59  Phos  3.0     01-24  Mg     1.8     01-24    TPro  7.0  /  Alb  2.7<L>  /  TBili  0.2  /  DBili  x   /  AST  20  /  ALT  31  /  AlkPhos  178<H>  01-24    PT/INR - ( 24 Jan 2023 06:59 )   PT: 14.0 sec;   INR: 1.21 ratio               RADIOLOGY & ADDITIONAL TESTS: Reviewed. INTERVAL HPI/OVERNIGHT EVENTS: Overnight patient's wound culture came back as resistant to zosyn. Night team started patient on meropenem, per sensitivities.     SUBJECTIVE: Patient was at OR this AM.     OBJECTIVE:    VITAL SIGNS:  ICU Vital Signs Last 24 Hrs  T(C): 36.8 (24 Jan 2023 21:12), Max: 37 (24 Jan 2023 13:14)  T(F): 98.3 (24 Jan 2023 21:12), Max: 98.6 (24 Jan 2023 13:14)  HR: 65 (24 Jan 2023 21:12) (65 - 73)  BP: 131/64 (24 Jan 2023 21:12) (118/65 - 131/64)  BP(mean): --  ABP: --  ABP(mean): --  RR: 18 (24 Jan 2023 21:12) (18 - 18)  SpO2: 98% (24 Jan 2023 21:12) (96% - 98%)    O2 Parameters below as of 24 Jan 2023 21:12  Patient On (Oxygen Delivery Method): room air              01-24 @ 07:01  -  01-25 @ 07:00  --------------------------------------------------------  IN: 0 mL / OUT: 500 mL / NET: -500 mL      CAPILLARY BLOOD GLUCOSE      POCT Blood Glucose.: 194 mg/dL (25 Jan 2023 06:15)      PHYSICAL EXAM:    General: NAD  HEENT: NC/AT; PERRL, clear conjunctiva  Neck: supple  Respiratory: CTA b/l  Cardiovascular: +S1/S2; RRR  Abdomen: soft, NT/ND; +BS x4  Extremities:  no LE edema  Vascular: WWP, 2+ peripheral pulses b/l;  Skin: normal color and turgor; no rash  Neurological: A&Ox3, move all extremities. CN II-XII intact    MEDICATIONS:  MEDICATIONS  (STANDING):  Dakins Solution - 1/4 Strength 1 Application(s) Topical daily  dextrose 5%. 1000 milliLiter(s) (100 mL/Hr) IV Continuous <Continuous>  dextrose 5%. 1000 milliLiter(s) (50 mL/Hr) IV Continuous <Continuous>  dextrose 50% Injectable 25 Gram(s) IV Push once  dextrose 50% Injectable 12.5 Gram(s) IV Push once  dextrose 50% Injectable 25 Gram(s) IV Push once  diclofenac sodium 1% Gel 4 Gram(s) Topical four times a day  folic acid 1 milliGRAM(s) Oral daily  glucagon  Injectable 1 milliGRAM(s) IntraMuscular once  insulin glargine Injectable (LANTUS) 10 Unit(s) SubCutaneous at bedtime  insulin lispro (ADMELOG) corrective regimen sliding scale   SubCutaneous three times a day before meals  insulin lispro (ADMELOG) corrective regimen sliding scale   SubCutaneous at bedtime  lidocaine   4% Patch 1 Patch Transdermal daily  meropenem  IVPB 1000 milliGRAM(s) IV Intermittent every 12 hours  mycophenolate mofetil 500 milliGRAM(s) Oral two times a day  pantoprazole    Tablet 40 milliGRAM(s) Oral before breakfast  predniSONE   Tablet 20 milliGRAM(s) Oral daily    MEDICATIONS  (PRN):  acetaminophen     Tablet .. 650 milliGRAM(s) Oral every 6 hours PRN Temp greater or equal to 38C (100.4F), Mild Pain (1 - 3)  aluminum hydroxide/magnesium hydroxide/simethicone Suspension 30 milliLiter(s) Oral every 4 hours PRN Dyspepsia  dextrose Oral Gel 15 Gram(s) Oral once PRN Blood Glucose LESS THAN 70 milliGRAM(s)/deciliter  melatonin 3 milliGRAM(s) Oral at bedtime PRN Insomnia  ondansetron Injectable 4 milliGRAM(s) IV Push every 8 hours PRN Nausea and/or Vomiting  senna 2 Tablet(s) Oral at bedtime PRN Constipation      ALLERGIES:  Allergies    codeine (Anaphylaxis)    Intolerances        LABS:                        8.9    7.42  )-----------( 363      ( 24 Jan 2023 06:59 )             28.6     01-24    134<L>  |  102  |  45<H>  ----------------------------<  218<H>  3.6   |  19<L>  |  2.32<H>    Ca    9.1      24 Jan 2023 06:59  Phos  3.0     01-24  Mg     1.8     01-24    TPro  7.0  /  Alb  2.7<L>  /  TBili  0.2  /  DBili  x   /  AST  20  /  ALT  31  /  AlkPhos  178<H>  01-24    PT/INR - ( 24 Jan 2023 06:59 )   PT: 14.0 sec;   INR: 1.21 ratio               RADIOLOGY & ADDITIONAL TESTS: Reviewed.

## 2023-01-25 NOTE — PRE-ANESTHESIA EVALUATION ADULT - NSANTHPMHFT_GEN_ALL_CORE
67M HTN HLD T2DM (insulin use) obseity, JEAN s/p OLT, GERD with chronic Right Lower Extremity wound.

## 2023-01-25 NOTE — DISCHARGE NOTE PROVIDER - NSDCMRMEDTOKEN_GEN_ALL_CORE_FT
Admelog 100 units/mL injectable solution: 9 unit(s) injectable 3 times a day (before meals)  folic acid 1 mg oral tablet: 1 tab(s) orally once a day  Lantus 100 units/mL subcutaneous solution: 14 unit(s) subcutaneous once a day (at bedtime)  mycophenolate mofetil 500 mg oral tablet: 1 tab(s) orally 2 times a day   omega-3 polyunsaturated fatty acids ethyl esters 1000 mg oral capsule: 1 cap(s) orally 2 times a day  pantoprazole 40 mg oral delayed release tablet: 1 tab(s) orally once a day (before a meal)  Please dispense ONE R lower extremity prefabricated AFO walker: Please dispense ONE R lower extremity prefabricated AFO walker  Ind: s/p R foot TMA with LEAH  Disp: 1 prefabricated AFO walker   predniSONE 20 mg oral tablet: 1 tab(s) orally once a day  senna leaf extract oral tablet: 2 tab(s) orally once a day (at bedtime), As needed, Constipation  sertraline 100 mg oral tablet: 1 tab(s) orally every 24 hours  valGANciclovir 450 mg oral tablet: 1 tab(s) orally every 12 hours  Wound Care Orders: Please apply wound vac with black foam to the R lateral foot graft @75mmHg 3x a week, thank you!    If Wound Vac Fails, please apply Wet to Dry   Admelog 100 units/mL injectable solution: 9 unit(s) injectable 3 times a day (before meals)  cefpodoxime 200 mg oral tablet: 1 tab(s) orally every 12 hours  folic acid 1 mg oral tablet: 1 tab(s) orally once a day  glucose 40% oral gel: 1 application orally prn  glucose 50% intravenous solution: 50 milliliter(s) intravenous once  glucose 50% intravenous solution: 25 milliliter(s) intravenous once  glucose 50% intravenous solution: 50 milliliter(s) intravenous once  insulin glargine 100 units/mL subcutaneous solution: 26 unit(s) subcutaneous once a day (at bedtime)  lactobacillus acidophilus oral capsule: 1  orally once a day  lidocaine 4% topical film: Apply topically to affected area once a day  metroNIDAZOLE 500 mg oral tablet: 1 tab(s) orally 2 times a day  mycophenolate mofetil 500 mg oral tablet: 1 tab(s) orally 2 times a day   omega-3 polyunsaturated fatty acids ethyl esters 1000 mg oral capsule: 1 cap(s) orally 2 times a day  pantoprazole 40 mg oral delayed release tablet: 1 tab(s) orally once a day (before a meal)  Please dispense ONE R lower extremity prefabricated AFO walker: Please dispense ONE R lower extremity prefabricated AFO walker  Ind: s/p R foot TMA with LEAH  Disp: 1 prefabricated AFO walker   predniSONE 20 mg oral tablet: 1 tab(s) orally once a day  psyllium 3.4 g/7 g oral powder for reconstitution: 1 packet(s) orally once a day  senna leaf extract oral tablet: 2 tab(s) orally once a day (at bedtime), As needed, Constipation  sertraline 100 mg oral tablet: 1 tab(s) orally every 24 hours  valGANciclovir 450 mg oral tablet: 1 tab(s) orally every 12 hours  Wound Care Orders: Please apply wound vac with black foam to the R lateral foot graft @75mmHg 3x a week, thank you!    If Wound Vac Fails, please apply Wet to Dry   cefpodoxime 200 mg oral tablet: 1 tab(s) orally every 12 hours  folic acid 1 mg oral tablet: 1 tab(s) orally once a day  glucose 40% oral gel: 1 application orally prn  glucose 50% intravenous solution: 50 milliliter(s) intravenous once  glucose 50% intravenous solution: 25 milliliter(s) intravenous once  glucose 50% intravenous solution: 50 milliliter(s) intravenous once  insulin glargine 100 units/mL subcutaneous solution: 34 unit(s) subcutaneous once a day (at bedtime)  insulin lispro 100 units/mL injectable solution: 12 unit(s) injectable 3 times a day (before meals)  lactobacillus acidophilus oral capsule: 1  orally once a day  lidocaine 4% topical film: Apply topically to affected area once a day  metroNIDAZOLE 500 mg oral tablet: 1 tab(s) orally 2 times a day  mycophenolate mofetil 500 mg oral tablet: 1 tab(s) orally 2 times a day   omega-3 polyunsaturated fatty acids ethyl esters 1000 mg oral capsule: 1 cap(s) orally 2 times a day  pantoprazole 40 mg oral delayed release tablet: 1 tab(s) orally once a day (before a meal)  Please dispense ONE R lower extremity prefabricated AFO walker: Please dispense ONE R lower extremity prefabricated AFO walker  Ind: s/p R foot TMA with LEAH  Disp: 1 prefabricated AFO walker   predniSONE 5 mg oral tablet: 3 tab(s) orally once a day  psyllium 3.4 g/7 g oral powder for reconstitution: 1 packet(s) orally once a day  senna leaf extract oral tablet: 2 tab(s) orally once a day (at bedtime), As needed, Constipation  sertraline 100 mg oral tablet: 1 tab(s) orally every 24 hours  valGANciclovir 450 mg oral tablet: 1 tab(s) orally every 12 hours  Wound Care Orders: Please apply wound vac with black foam to the R lateral foot graft @75mmHg 3x a week, thank you!    If Wound Vac Fails, please apply Wet to Dry

## 2023-01-25 NOTE — PROVIDER CONTACT NOTE (OTHER) - ACTION/TREATMENT ORDERED:
provider made aware. Give lantus as ordered and sliding scale was reordered to reflect a moderate scale.

## 2023-01-25 NOTE — CONSULT NOTE ADULT - ASSESSMENT
osteomyelitis   diarrhea    s/p OR  GI pcr negative  cmv negative  valcyte dced by ID  monitor GI fn  diet as tolerated  transplant ID and hepatology following  d/w patient in pacu

## 2023-01-25 NOTE — PROGRESS NOTE ADULT - SUBJECTIVE AND OBJECTIVE BOX
Podiatry Pager #: [phone] @ [facility] and [phone] at [facility], please page 10 digits full number.    Patient is a 67y old  Male who presents with a chief complaint of cellulitis vs ostemolyelitis (24 Jan 2023 12:47)      INTERVAL HPI/OVERNIGHT EVENTS:   Pt is scheduled for R foot Transmetatarsal Amputation with Tendo Achilles lengthening Fifth Ray Resection with Posterior Tibial Tenotomy with Dr. Wang at 0730. Patient is aware of procedure and is NPO since midnight.    MEDICATIONS  (STANDING):  Dakins Solution - 1/4 Strength 1 Application(s) Topical daily  dextrose 5%. 1000 milliLiter(s) (100 mL/Hr) IV Continuous <Continuous>  dextrose 5%. 1000 milliLiter(s) (50 mL/Hr) IV Continuous <Continuous>  dextrose 50% Injectable 25 Gram(s) IV Push once  dextrose 50% Injectable 12.5 Gram(s) IV Push once  dextrose 50% Injectable 25 Gram(s) IV Push once  diclofenac sodium 1% Gel 4 Gram(s) Topical four times a day  folic acid 1 milliGRAM(s) Oral daily  glucagon  Injectable 1 milliGRAM(s) IntraMuscular once  insulin glargine Injectable (LANTUS) 10 Unit(s) SubCutaneous at bedtime  insulin lispro (ADMELOG) corrective regimen sliding scale   SubCutaneous three times a day before meals  insulin lispro (ADMELOG) corrective regimen sliding scale   SubCutaneous at bedtime  lidocaine   4% Patch 1 Patch Transdermal daily  meropenem  IVPB 1000 milliGRAM(s) IV Intermittent every 12 hours  mycophenolate mofetil 500 milliGRAM(s) Oral two times a day  pantoprazole    Tablet 40 milliGRAM(s) Oral before breakfast  predniSONE   Tablet 20 milliGRAM(s) Oral daily    MEDICATIONS  (PRN):  acetaminophen     Tablet .. 650 milliGRAM(s) Oral every 6 hours PRN Temp greater or equal to 38C (100.4F), Mild Pain (1 - 3)  aluminum hydroxide/magnesium hydroxide/simethicone Suspension 30 milliLiter(s) Oral every 4 hours PRN Dyspepsia  dextrose Oral Gel 15 Gram(s) Oral once PRN Blood Glucose LESS THAN 70 milliGRAM(s)/deciliter  melatonin 3 milliGRAM(s) Oral at bedtime PRN Insomnia  ondansetron Injectable 4 milliGRAM(s) IV Push every 8 hours PRN Nausea and/or Vomiting  senna 2 Tablet(s) Oral at bedtime PRN Constipation      Allergies    codeine (Anaphylaxis)    Intolerances        Vital Signs Last 24 Hrs  T(C): 36.8 (24 Jan 2023 21:12), Max: 37 (24 Jan 2023 13:14)  T(F): 98.3 (24 Jan 2023 21:12), Max: 98.6 (24 Jan 2023 13:14)  HR: 65 (24 Jan 2023 21:12) (65 - 73)  BP: 131/64 (24 Jan 2023 21:12) (118/65 - 131/64)  BP(mean): --  RR: 18 (24 Jan 2023 21:12) (18 - 18)  SpO2: 98% (24 Jan 2023 21:12) (96% - 98%)    Parameters below as of 24 Jan 2023 21:12  Patient On (Oxygen Delivery Method): room air        LABS:                        8.9    7.42  )-----------( 363      ( 24 Jan 2023 06:59 )             28.6     01-24    134<L>  |  102  |  45<H>  ----------------------------<  218<H>  3.6   |  19<L>  |  2.32<H>    Ca    9.1      24 Jan 2023 06:59  Phos  3.0     01-24  Mg     1.8     01-24    TPro  7.0  /  Alb  2.7<L>  /  TBili  0.2  /  DBili  x   /  AST  20  /  ALT  31  /  AlkPhos  178<H>  01-24    PT/INR - ( 24 Jan 2023 06:59 )   PT: 14.0 sec;   INR: 1.21 ratio             CAPILLARY BLOOD GLUCOSE      POCT Blood Glucose.: 194 mg/dL (25 Jan 2023 06:15)  POCT Blood Glucose.: 268 mg/dL (24 Jan 2023 21:41)  POCT Blood Glucose.: 342 mg/dL (24 Jan 2023 16:25)  POCT Blood Glucose.: 245 mg/dL (24 Jan 2023 12:01)  POCT Blood Glucose.: 223 mg/dL (24 Jan 2023 07:56)      RADIOLOGY & ADDITIONAL TESTS:    Plan:   To OR today at 0730 with Dr. Wang for R foot Transmetatarsal Amputation with Tendo Achilles lengthening Fifth Ray Resection with Posterior Tibial Tenotomy .   CXR on sunrise.EKG on sunrise.  Medicalclearance since 1/24 and documented in chart.  Consent signed and in chart.    Procedure was explained to patient in detail. All alternatives, risks and complications were discussed. All questions answered.

## 2023-01-25 NOTE — PROGRESS NOTE ADULT - PROBLEM SELECTOR PLAN 3
Cardiovascular Risk Stratification - RCRI =  (2 points).  1. History of ischemic heart disease: No  2. History of congestive heart failure: No  3. History of cerebrovascular disease (stroke or transient ischemic attack): No  4. History of diabetes requiring preoperative insulin use: Yes  5. Chronic kidney disease (creatinine > 2 mg/dL): Yes  6. Undergoing suprainguinal vascular, intraperitoneal, or intrathoracic surgery: No  0 predictors = 0.4%, 1 predictor = 0.9%, 2 predictors = 6.6%, =3 predictors = >11%    - Overall this patient is as 6.6% risk (for cardiac death, nonfatal myocardial infarction, and nonfatal cardiac arrest perioperatively for this low risk procedure). Patient is medically optimized for TMA by podiatry    COVID prior to OR pending; ekg in EMR from 1/22; pre-op labs to be drawn in AM; NPO at midnight; Insulin regimen adjusted for NPO status

## 2023-01-25 NOTE — PROGRESS NOTE ADULT - ASSESSMENT
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    Febrile on 1/23: T max 101.6F  No leukocytosis     -R Foot xray on 1/22:  mottled appearance of the stump margin of the fifth metatarsal concerning for osteomyelitis, surrounding soft tissue edema and air.   -Podiatry impression: 5th metatarsal wound site open to bone, fibrogranular, graft incorporating, retained staple noted, mild serous drainage, erythema to the level of the ankle, excess warmth noted to the global foot. R foot wound Cx sent by podiatry   -MRI R foot on 1/23: OM of 4th metatarsal and 4th proximal phalanx, signal abnormality at the distal margin of the remaining first proximal phalanx ?OM, possible OM in remaining distal 5th metatarsal, Large adjacent lateral ulceration with soft tissue infection. Joint effusion at the tarsometatarsal joints and at the navicular middle cuneiform articulation, ?joint infection.     Blood Cx on 1/22: strep B (1/4 bottles) and CoNS (1/4 bottles)  R foot Wound Cx on 1/22: Klebsiella pneumonia and Strep B   UA on 1/22: WBC >50, neg bacteria   Urine Cx on 1/22: NF    Antimicrobials:   Vanc 1/22  Cefepime 1/22  Zosyn (1/23-->)    #OM 4th metatarsal and proximal phalanx, ?OM remaining 5th and 1st proximal phalanx - s/p R TMA on 1/25  #Strep B bacteremia 2/2 to OM  #Positive Culture Finding (Wound Culture) - polymicrobial   #Immunosuppressed, liver transplant recipient   #CMV Viremia -cleared    Recommendations:   -s/p R TMA on 1/25 with low suspicion for residual infection   -discontinue Meropenem  -start Ceftriaxone 2 g IV daily with Flagyl 500 mg q 12hrs   -F/up repeat final blood Cx sent on 1/24  -Recommend TTE   -Valcyte discontinued, CMV PCR neg X2   -Monitor T curve and WBC trend       Hind El Soufi, MD, PGY4  ID fellow  [employer] Teams Preferred  After 5pm/weekends call [phone]         67 year old Male with PMHx of obesity, IDDM, HFpEF (EF 65% as of 12/2021), CKD, 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    Febrile on 1/23: T max 101.6F  No leukocytosis     -R Foot xray on 1/22:  mottled appearance of the stump margin of the fifth metatarsal concerning for osteomyelitis, surrounding soft tissue edema and air.   -Podiatry impression: 5th metatarsal wound site open to bone, fibrogranular, graft incorporating, retained staple noted, mild serous drainage, erythema to the level of the ankle, excess warmth noted to the global foot. R foot wound Cx sent by podiatry   -MRI R foot on 1/23: OM of 4th metatarsal and 4th proximal phalanx, signal abnormality at the distal margin of the remaining first proximal phalanx ?OM, possible OM in remaining distal 5th metatarsal, Large adjacent lateral ulceration with soft tissue infection. Joint effusion at the tarsometatarsal joints and at the navicular middle cuneiform articulation, ?joint infection.     Blood Cx on 1/22: strep B (1/4 bottles) and CoNS (1/4 bottles)  R foot Wound Cx on 1/22: Klebsiella pneumonia and Strep B   UA on 1/22: WBC >50, neg bacteria   Urine Cx on 1/22: NF    Antimicrobials:   Vanc 1/22  Cefepime 1/22  Zosyn (1/23-->)    #OM 4th metatarsal and proximal phalanx, ?OM remaining 5th and 1st proximal phalanx - s/p R TMA on 1/25  #Positive Culture Finding (Wound Culture) - polymicrobial   #Immunosuppressed, liver transplant recipient   #CMV Viremia -cleared    Recommendations:   -s/p R TMA on 1/25 with low suspicion for residual infection   -discontinue Meropenem  -start Ceftriaxone 2 g IV daily with Flagyl 500 mg q 12hrs   -F/up repeat final blood Cx sent on 1/24  -Valcyte discontinued, CMV PCR neg X2   -Monitor T curve and WBC trend       Hind Eduin Arias MD, PGY4  ID fellow  [employer] Teams Preferred  After 5pm/weekends call [phone]         67 year old Male with PMHx of obesity, IDDM, HFpEF (EF 65% as of 12/2021), CKD, 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    Febrile on 1/23: T max 101.6F  No leukocytosis     -R Foot xray on 1/22:  mottled appearance of the stump margin of the fifth metatarsal concerning for osteomyelitis, surrounding soft tissue edema and air.   -Podiatry impression: 5th metatarsal wound site open to bone, fibrogranular, graft incorporating, retained staple noted, mild serous drainage, erythema to the level of the ankle, excess warmth noted to the global foot. R foot wound Cx sent by podiatry   -MRI R foot on 1/23: OM of 4th metatarsal and 4th proximal phalanx, signal abnormality at the distal margin of the remaining first proximal phalanx ?OM, possible OM in remaining distal 5th metatarsal, Large adjacent lateral ulceration with soft tissue infection. Joint effusion at the tarsometatarsal joints and at the navicular middle cuneiform articulation, ?joint infection.     Blood Cx on 1/22: CoNS (1/4 bottles)   Of note, there was no growth of GBS on blood cultures, this was an error from the microbiology lab  R foot Wound Cx on 1/22: Klebsiella pneumonia and Strep B   UA on 1/22: WBC >50, neg bacteria   Urine Cx on 1/22: NF    Antimicrobials:   Vanc 1/22  Cefepime 1/22  Zosyn (1/23-->)    #OM 4th metatarsal and proximal phalanx, ?OM remaining 5th and 1st proximal phalanx - s/p R TMA on 1/25  #Positive Culture Finding (Wound Culture) - polymicrobial   #Immunosuppressed, liver transplant recipient   #CMV Viremia -cleared  #Diarrhea    Recommendations:   -s/p R TMA on 1/25 with low suspicion for residual infection   -discontinue Meropenem (discontinued)  -start Ceftriaxone 2 g IV daily with Flagyl 500 mg q 12hrs (ordered)  -GI PCR Negative (1/23) If continued diarrhea recommend C diff toxin assay  -F/up repeat final blood Cx sent on 1/24  -Valcyte discontinued, CMV PCR neg X2   -Monitor T curve and WBC trend       Hind Eduin Arias MD, PGY4  ID fellow  [employer] Teams Preferred  After 5pm/weekends call [phone]

## 2023-01-25 NOTE — PROGRESS NOTE ADULT - ASSESSMENT
To OR today at 0730 with Dr. Wang for R foot Transmetatarsal Amputation with Tendo Achilles lengthening Fifth Ray Resection with Posterior Tibial Tenotomy .   CXR on sunrise.EKG on sunrise.  Medicalclearance since 1/24 and documented in chart.  Consent signed and in chart.    Procedure was explained to patient in detail. All alternatives, risks and complications were discussed. All questions answered.

## 2023-01-25 NOTE — BRIEF OPERATIVE NOTE - OPERATION/FINDINGS
s/p R foot Transmetatarsal Amputation with Tendo Achilles lengthening and Fifth Ray Resection with Posterior Tibial Tenotomy : at proximal resection bone was of good quality, no evidence of purulence or infection tracking proximally. Incision primarily closed medial and distal, lateral open with Keracis graft

## 2023-01-26 NOTE — PROGRESS NOTE ADULT - ATTENDING COMMENTS
Liver transplant recipient with recurrent LE infections and OM  Valcyte stopped; two negative CMV PCR's  Recurrence of infection at R foot partial 5th ray amputation  OF NOTE: No GBS Bacteremia - this was a micro lab error  Wound culture with polymicrobial growth  s/p RLE TMA with low concern for residual infection  Continue Ceftriaxone / Metronidazole  Clean bone culture gram stain with [facility]    I will continue to follow. Please feel free to contact me with any further questions.    Eusebio Pérez M.D.  [facility] Division of Infectious Disease  8AM-5PM Monday - Friday: Available on [employer] Teams  After Hours and Holidays (or if no response on [employer] Teams): Please contact the Infectious Diseases Office at [phone]

## 2023-01-26 NOTE — PHYSICAL THERAPY INITIAL EVALUATION ADULT - ADDITIONAL COMMENTS
Pt known to PT from previous admission. Pt d/c to [facility] previous admission, pt refused and went home. Pt resides in house with 3 children, 3 steps to enter, pt was using RW for amb. Pt states at home he was keeping RLE NWB via hop to gait. Pt owns w/c, commode, shower chair, grab bars, +tub. Pt states children can assist at home if needed. Pt states he was showering and dressing himself independently PTA. Pt with recent fall.
Pt known to PT from previous admission. Pt d/c to [facility] previous admission, pt refused and went home. Pt resides in house with 3 children, 3 steps to enter, pt was using RW for amb. Pt states at home he was keeping RLE NWB via hop to gait. Pt owns w/c, commode, shower chair, grab bars, +tub. Pt states children can assist at home if needed. Pt states he was showering and dressing himself independently PTA. Pt with recent fall.

## 2023-01-26 NOTE — PHYSICAL THERAPY INITIAL EVALUATION ADULT - PERTINENT HX OF CURRENT PROBLEM, REHAB EVAL
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  Patient was sent home with a wound VAC over his foot.  He states this morning when he woke up the wound VAC was not on properly so he left it at home.  He has been experiencing increasing pain in his foot.  He is also having difficulty ambulating.  Patient ambulates with a walker at baseline.  He has been experiencing diarrhea.  No fever, nausea, vomiting, cough, sore throat, trouble breathing, chest pain, abdominal pain, dysuria or increased urinary frequency.    Xray R foot: S/p partial amputation of the fifth ray. When compared with prior radiograph, there appears to be mottled appearance of the stump margin of the fifth metatarsal concerning for osteomyelitis. There is surrounding soft tissue edema and air. Soft tissue air is also present within the distal soft tissues along the lateral aspect of the fourth metatarsophalangeal joint.  There is advanced mid foot arthrosis. There are vascular calcifications.
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  Patient was sent home with a wound VAC over his foot.  He states this morning when he woke up the wound VAC was not on properly so he left it at home.  He has been experiencing increasing pain in his foot.  He is also having difficulty ambulating.  Patient ambulates with a walker at baseline.  He has been experiencing diarrhea.  No fever, nausea, vomiting, cough, sore throat, trouble breathing, chest pain, abdominal pain, dysuria or increased urinary frequency.    Xray R foot: S/p partial amputation of the fifth ray. When compared with prior radiograph, there appears to be mottled appearance of the stump margin of the fifth metatarsal concerning for osteomyelitis. There is surrounding soft tissue edema and air. Soft tissue air is also present within the distal soft tissues along the lateral aspect of the fourth metatarsophalangeal joint.  There is advanced mid foot arthrosis. There are vascular calcifications.

## 2023-01-26 NOTE — PROGRESS NOTE ADULT - PROBLEM SELECTOR PLAN 5
Initial VBG showed pH of 7.24, lactate 2.9, pCO2 41, HCO3 19. Repeat 5 hours later showed normalization of pH to 7.33, pCO2 of 36, HCO3 of 19. Possibly due to specimen delivery delay vs multifactorial cause given GI losses, MISA on CKD, infection  -Improving; ctm on daily bmp  -will assess for need for bicarb supplementation Pt admitted with hyponatremia of 130 i/s/o increasing pain of RLE as well as MISA on CKD. Lower end of normal w/ correction given hyperglycemia of 342  -f/u AM BMP to trend Na  -can f/u with urine lytes should Na downtrend

## 2023-01-26 NOTE — PROGRESS NOTE ADULT - PROBLEM SELECTOR PLAN 9
Pt wth JEAN cirrhosis, s/p OLT in 2020  -c/w prednisone, MMF, valganciclovir home medications  -check CMV  -transplant hepatology consulted DVT PPx: Sub Q hep   Diet: consistent carb  Code status: Full code  Dispo: [facility]  PCP: Dr. Dillan Coto  Pharmacy: [facility]

## 2023-01-26 NOTE — PROVIDER CONTACT NOTE (CRITICAL VALUE NOTIFICATION) - ACTION/TREATMENT ORDERED:
ATTEMPTED TO CONTACT TEAMS TO GIVE RESULTS, WILL PASS ON TO NIGHT NURSE TO REPORT RESULTS
NP NOTIFIED, WILL CONTINUE TO MONITOR.
No new orders at this time.

## 2023-01-26 NOTE — PHYSICAL THERAPY INITIAL EVALUATION ADULT - DID THE PATIENT HAVE SURGERY?
s/p R foot Transmetatarsal Amputation with Tendo Achilles lengthening and Fifth Ray Resection with Posterior Tibial Tenotomy : at proximal resection bone was of good quality, no evidence of purulence or infection tracking proximally. Incision primarily closed medial and distal, lateral open with Keracis graft, s/p recent admission for right fifth ray amputation on 12/29/22./yes
recent admission for right fifth ray amputation on 12/29/22./yes

## 2023-01-26 NOTE — PROGRESS NOTE ADULT - PROBLEM SELECTOR PLAN 3
Cardiovascular Risk Stratification - RCRI =  (2 points).  1. History of ischemic heart disease: No  2. History of congestive heart failure: No  3. History of cerebrovascular disease (stroke or transient ischemic attack): No  4. History of diabetes requiring preoperative insulin use: Yes  5. Chronic kidney disease (creatinine > 2 mg/dL): Yes  6. Undergoing suprainguinal vascular, intraperitoneal, or intrathoracic surgery: No  0 predictors = 0.4%, 1 predictor = 0.9%, 2 predictors = 6.6%, =3 predictors = >11%    - Overall this patient is as 6.6% risk (for cardiac death, nonfatal myocardial infarction, and nonfatal cardiac arrest perioperatively for this low risk procedure). Patient is medically optimized for TMA by podiatry    COVID prior to OR pending; ekg in EMR from 1/22; pre-op labs to be drawn in AM; NPO at midnight; Insulin regimen adjusted for NPO status Pt with known CKD, b/l SCr 1.6-1.8 (1.89 on discharge 3 weeks prior to admission 12/30/2022), now with SCr of 2.32 c/f ATN i/s/o possible osteomyelitis  -Urine lytes c/w pre-renal; trial IVF  -F/u AM BMP  -monitor urine output

## 2023-01-26 NOTE — PHYSICAL THERAPY INITIAL EVALUATION ADULT - TRANSFER SAFETY CONCERNS NOTED: SIT/STAND, REHAB EVAL
decreased balance during turns/losing balance/decreased weight-shifting ability
decreased balance during turns/decreased weight-shifting ability

## 2023-01-26 NOTE — PROGRESS NOTE ADULT - ASSESSMENT
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 Found to be febrile and tachycardic on admission w/ MISA on CKD and metabolic acidosis on labs - possible osteomyelitic changes on imaging. Admitted to medicine for further management.  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 Found to be febrile and tachycardic on admission w/ MISA on CKD and metabolic acidosis on labs - possible osteomyelitic changes on imaging. Admitted to medicine for further management.  Patient s/p TMA on 1/25 pending bone biopsy results. Continuing on IV Abx

## 2023-01-26 NOTE — PROGRESS NOTE ADULT - PROBLEM SELECTOR PLAN 4
Pt with known CKD, b/l SCr 1.6-1.8 (1.89 on discharge 3 weeks prior to admission 12/30/2022), now with SCr of 2.32 c/f ATN i/s/o possible osteomyelitis  -Urine lytes c/w pre-renal; trial IVF  -F/u AM BMP  -monitor urine output Initial VBG showed pH of 7.24, lactate 2.9, pCO2 41, HCO3 19. Repeat 5 hours later showed normalization of pH to 7.33, pCO2 of 36, HCO3 of 19. Possibly due to specimen delivery delay vs multifactorial cause given GI losses, MISA on CKD, infection  -Improving; ctm on daily bmp  -will assess for need for bicarb supplementation

## 2023-01-26 NOTE — PHYSICAL THERAPY INITIAL EVALUATION ADULT - NSPTDISCHREC_GEN_A_CORE
Subacute Rehab; if home, pt would require assist with ALL ADL's and functional mobility and home PT. Pt would require assistance to get into home.
Subacute Rehab; if home, pt would require assist with ALL ADL's and functional mobility and home PT. Pt would require assistance to get into home.

## 2023-01-26 NOTE — PHYSICAL THERAPY INITIAL EVALUATION ADULT - GAIT TRAINING, PT EVAL
GOAL: Pt will ambulate 25 feet w/ CGA, w/use of appropriate assistive device in 4 weeks
GOAL: Pt will ambulate 25 feet w/ mod-maxA, w/use of appropriate assistive device in 4 weeks

## 2023-01-26 NOTE — PROGRESS NOTE ADULT - SUBJECTIVE AND OBJECTIVE BOX
INTERVAL HPI/OVERNIGHT EVENTS:    SUBJECTIVE: Patient seen and examined at bedside.    OBJECTIVE:    VITAL SIGNS:  ICU Vital Signs Last 24 Hrs  T(C): 37.2 (26 Jan 2023 00:39), Max: 37.2 (26 Jan 2023 00:39)  T(F): 98.9 (26 Jan 2023 00:39), Max: 98.9 (26 Jan 2023 00:39)  HR: 62 (26 Jan 2023 00:39) (53 - 63)  BP: 109/66 (26 Jan 2023 00:39) (102/59 - 127/63)  BP(mean): 85 (25 Jan 2023 11:00) (73 - 87)  ABP: --  ABP(mean): --  RR: 18 (26 Jan 2023 00:39) (16 - 18)  SpO2: 96% (26 Jan 2023 00:39) (95% - 100%)    O2 Parameters below as of 26 Jan 2023 00:39  Patient On (Oxygen Delivery Method): room air              01-25 @ 07:01  -  01-26 @ 07:00  --------------------------------------------------------  IN: 0 mL / OUT: 675 mL / NET: -675 mL      CAPILLARY BLOOD GLUCOSE      POCT Blood Glucose.: 432 mg/dL (25 Jan 2023 21:39)      PHYSICAL EXAM:    General: NAD  HEENT: NC/AT; PERRL, clear conjunctiva  Neck: supple  Respiratory: CTA b/l  Cardiovascular: +S1/S2; RRR  Abdomen: soft, NT/ND; +BS x4  Extremities:  no LE edema  Vascular: WWP, 2+ peripheral pulses b/l;  Skin: normal color and turgor; no rash  Neurological: A&Ox3, move all extremities. CN II-XII intact    MEDICATIONS:  MEDICATIONS  (STANDING):  cefTRIAXone   IVPB 2000 milliGRAM(s) IV Intermittent every 24 hours  dextrose 5%. 1000 milliLiter(s) (50 mL/Hr) IV Continuous <Continuous>  dextrose 5%. 1000 milliLiter(s) (100 mL/Hr) IV Continuous <Continuous>  dextrose 50% Injectable 25 Gram(s) IV Push once  dextrose 50% Injectable 12.5 Gram(s) IV Push once  dextrose 50% Injectable 25 Gram(s) IV Push once  folic acid 1 milliGRAM(s) Oral daily  glucagon  Injectable 1 milliGRAM(s) IntraMuscular once  heparin   Injectable 5000 Unit(s) SubCutaneous every 8 hours  insulin glargine Injectable (LANTUS) 16 Unit(s) SubCutaneous at bedtime  insulin lispro (ADMELOG) corrective regimen sliding scale   SubCutaneous three times a day before meals  insulin lispro (ADMELOG) corrective regimen sliding scale   SubCutaneous at bedtime  insulin lispro Injectable (ADMELOG) 2 Unit(s) SubCutaneous three times a day before meals  lactated ringers. 1000 milliLiter(s) (75 mL/Hr) IV Continuous <Continuous>  lidocaine   4% Patch 1 Patch Transdermal daily  metroNIDAZOLE    Tablet 500 milliGRAM(s) Oral two times a day  mycophenolate mofetil 500 milliGRAM(s) Oral two times a day  pantoprazole    Tablet 40 milliGRAM(s) Oral before breakfast  predniSONE   Tablet 20 milliGRAM(s) Oral daily    MEDICATIONS  (PRN):  acetaminophen     Tablet .. 650 milliGRAM(s) Oral every 6 hours PRN Temp greater or equal to 38C (100.4F), Mild Pain (1 - 3)  aluminum hydroxide/magnesium hydroxide/simethicone Suspension 30 milliLiter(s) Oral every 4 hours PRN Dyspepsia  dextrose Oral Gel 15 Gram(s) Oral once PRN Blood Glucose LESS THAN 70 milliGRAM(s)/deciliter  melatonin 3 milliGRAM(s) Oral at bedtime PRN Insomnia  ondansetron Injectable 4 milliGRAM(s) IV Push every 8 hours PRN Nausea and/or Vomiting  senna 2 Tablet(s) Oral at bedtime PRN Constipation      ALLERGIES:  Allergies    codeine (Anaphylaxis)    Intolerances        LABS:                        9.2    7.25  )-----------( 393      ( 25 Jan 2023 07:17 )             29.1     01-25    136  |  103  |  48<H>  ----------------------------<  153<H>  4.1   |  18<L>  |  2.46<H>    Ca    9.4      25 Jan 2023 07:17  Phos  2.9     01-25  Mg     2.0     01-25    TPro  6.5  /  Alb  2.6<L>  /  TBili  0.2  /  DBili  x   /  AST  25  /  ALT  32  /  AlkPhos  168<H>  01-25    PT/INR - ( 25 Jan 2023 07:17 )   PT: 13.2 sec;   INR: 1.15 ratio         PTT - ( 25 Jan 2023 07:17 )  PTT:25.5 sec      RADIOLOGY & ADDITIONAL TESTS: Reviewed. INTERVAL HPI/OVERNIGHT EVENTS: MARS OVN    SUBJECTIVE: Patient seen and examined at bedside. Patient states he is feeling well today with no complaints. Denies fevers, chills, RLE pain     OBJECTIVE:    VITAL SIGNS:  ICU Vital Signs Last 24 Hrs  T(C): 37.2 (26 Jan 2023 00:39), Max: 37.2 (26 Jan 2023 00:39)  T(F): 98.9 (26 Jan 2023 00:39), Max: 98.9 (26 Jan 2023 00:39)  HR: 62 (26 Jan 2023 00:39) (53 - 63)  BP: 109/66 (26 Jan 2023 00:39) (102/59 - 127/63)  BP(mean): 85 (25 Jan 2023 11:00) (73 - 87)  ABP: --  ABP(mean): --  RR: 18 (26 Jan 2023 00:39) (16 - 18)  SpO2: 96% (26 Jan 2023 00:39) (95% - 100%)    O2 Parameters below as of 26 Jan 2023 00:39  Patient On (Oxygen Delivery Method): room air              01-25 @ 07:01  -  01-26 @ 07:00  --------------------------------------------------------  IN: 0 mL / OUT: 675 mL / NET: -675 mL      CAPILLARY BLOOD GLUCOSE      POCT Blood Glucose.: 432 mg/dL (25 Jan 2023 21:39)      PHYSICAL EXAM:    General: NAD  HEENT: NC/AT; PERRL, clear conjunctiva  Neck: supple  Respiratory: CTA b/l  Cardiovascular: +S1/S2; RRR  Abdomen: soft, NT/ND; +BS x4  Extremities:  no LE edema, right foot in cast; no sensation in bilateral feet   Vascular: WWP, 1+ peripheral pulses b/l;  Skin: normal color and turgor; no rash  Neurological: A&Ox3, move all extremities    MEDICATIONS:  MEDICATIONS  (STANDING):  cefTRIAXone   IVPB 2000 milliGRAM(s) IV Intermittent every 24 hours  dextrose 5%. 1000 milliLiter(s) (50 mL/Hr) IV Continuous <Continuous>  dextrose 5%. 1000 milliLiter(s) (100 mL/Hr) IV Continuous <Continuous>  dextrose 50% Injectable 25 Gram(s) IV Push once  dextrose 50% Injectable 12.5 Gram(s) IV Push once  dextrose 50% Injectable 25 Gram(s) IV Push once  folic acid 1 milliGRAM(s) Oral daily  glucagon  Injectable 1 milliGRAM(s) IntraMuscular once  heparin   Injectable 5000 Unit(s) SubCutaneous every 8 hours  insulin glargine Injectable (LANTUS) 16 Unit(s) SubCutaneous at bedtime  insulin lispro (ADMELOG) corrective regimen sliding scale   SubCutaneous three times a day before meals  insulin lispro (ADMELOG) corrective regimen sliding scale   SubCutaneous at bedtime  insulin lispro Injectable (ADMELOG) 2 Unit(s) SubCutaneous three times a day before meals  lactated ringers. 1000 milliLiter(s) (75 mL/Hr) IV Continuous <Continuous>  lidocaine   4% Patch 1 Patch Transdermal daily  metroNIDAZOLE    Tablet 500 milliGRAM(s) Oral two times a day  mycophenolate mofetil 500 milliGRAM(s) Oral two times a day  pantoprazole    Tablet 40 milliGRAM(s) Oral before breakfast  predniSONE   Tablet 20 milliGRAM(s) Oral daily    MEDICATIONS  (PRN):  acetaminophen     Tablet .. 650 milliGRAM(s) Oral every 6 hours PRN Temp greater or equal to 38C (100.4F), Mild Pain (1 - 3)  aluminum hydroxide/magnesium hydroxide/simethicone Suspension 30 milliLiter(s) Oral every 4 hours PRN Dyspepsia  dextrose Oral Gel 15 Gram(s) Oral once PRN Blood Glucose LESS THAN 70 milliGRAM(s)/deciliter  melatonin 3 milliGRAM(s) Oral at bedtime PRN Insomnia  ondansetron Injectable 4 milliGRAM(s) IV Push every 8 hours PRN Nausea and/or Vomiting  senna 2 Tablet(s) Oral at bedtime PRN Constipation      ALLERGIES:  Allergies    codeine (Anaphylaxis)    Intolerances        LABS:                        9.2    7.25  )-----------( 393      ( 25 Jan 2023 07:17 )             29.1     01-25    136  |  103  |  48<H>  ----------------------------<  153<H>  4.1   |  18<L>  |  2.46<H>    Ca    9.4      25 Jan 2023 07:17  Phos  2.9     01-25  Mg     2.0     01-25    TPro  6.5  /  Alb  2.6<L>  /  TBili  0.2  /  DBili  x   /  AST  25  /  ALT  32  /  AlkPhos  168<H>  01-25    PT/INR - ( 25 Jan 2023 07:17 )   PT: 13.2 sec;   INR: 1.15 ratio         PTT - ( 25 Jan 2023 07:17 )  PTT:25.5 sec      RADIOLOGY & ADDITIONAL TESTS: Reviewed.

## 2023-01-26 NOTE — PHYSICAL THERAPY INITIAL EVALUATION ADULT - NSPTDMEREC_GEN_A_CORE
If home pt would benefit from hospital bed and mechanical lift device for OOB mobility. Pt owns w/c, commode, shower chair, grab bars, +tub.
Pt owns w/c, [employer] what's in store, shower chair, grab bars, +tub.

## 2023-01-26 NOTE — PROVIDER CONTACT NOTE (CRITICAL VALUE NOTIFICATION) - SITUATION
GROWTH IN AEROBIC BOTTLE GRAM + COCCI IN CLUSTERS
Blood CX FROM 1/22/2023 GROWTH IN AEROBIC GRAM + COCCI IN PAIRS
Tissue Cx Gram stain +, no polymorphonuclear cells seen, +gram cocci in pairs

## 2023-01-26 NOTE — PHYSICAL THERAPY INITIAL EVALUATION ADULT - BED MOBILITY TRAINING, PT EVAL
GOAL: Pt will perform bed mobility w/ CGA, in 4 weeks.
GOAL: Pt will perform bed mobility w/ modA, in 4 weeks.

## 2023-01-26 NOTE — PHYSICAL THERAPY INITIAL EVALUATION ADULT - LEVEL OF INDEPENDENCE: GAIT, REHAB EVAL
pt unable to maintain RLE NWB t/o session despite max VC & TC's/unable to perform
minimum assist (75% patients effort)

## 2023-01-26 NOTE — PROGRESS NOTE ADULT - PROBLEM SELECTOR PLAN 7
Pt with 5 day history of diarrhea (>10x/day), previous elevated CMV though pt currently on acycolvir. No further episodes since 1/24 AM  -f/u GI PCR  -likely cause of metabolic acidosis On lantus 14u nightly, admelog 9u TID  -lantus 18 units and lispro 4 u here   -adjust insulin per SS requirements  -reduced lantus to 10 u prior to OR and NPO

## 2023-01-26 NOTE — PROGRESS NOTE ADULT - SUBJECTIVE AND OBJECTIVE BOX
Podiatry pager #: [phone] ([place])/ [phone] ([facility])    Patient is a 67y old  Male who presents with a chief complaint of cellulitis vs ostemolyelitis (26 Jan 2023 11:24)       INTERVAL HPI/OVERNIGHT EVENTS:  Patient seen and evaluated at bedside.  Pt is resting comfortable in NAD. Denies N/V/F/C.     Allergies    codeine (Anaphylaxis)    Intolerances        Vital Signs Last 24 Hrs  T(C): 36.4 (26 Jan 2023 10:24), Max: 37.2 (26 Jan 2023 00:39)  T(F): 97.6 (26 Jan 2023 10:24), Max: 98.9 (26 Jan 2023 00:39)  HR: 70 (26 Jan 2023 10:24) (62 - 70)  BP: 124/63 (26 Jan 2023 10:24) (106/62 - 124/63)  BP(mean): --  RR: 18 (26 Jan 2023 10:24) (18 - 18)  SpO2: 95% (26 Jan 2023 10:24) (95% - 96%)    Parameters below as of 26 Jan 2023 10:24  Patient On (Oxygen Delivery Method): room air        LABS:                        8.4    8.27  )-----------( 412      ( 26 Jan 2023 10:22 )             26.4     01-26    133<L>  |  104  |  48<H>  ----------------------------<  281<H>  4.2   |  17<L>  |  2.03<H>    Ca    8.8      26 Jan 2023 10:22  Phos  3.1     01-26  Mg     1.9     01-26    TPro  6.4  /  Alb  2.7<L>  /  TBili  0.1<L>  /  DBili  x   /  AST  14  /  ALT  27  /  AlkPhos  149<H>  01-26    PT/INR - ( 25 Jan 2023 07:17 )   PT: 13.2 sec;   INR: 1.15 ratio         PTT - ( 25 Jan 2023 07:17 )  PTT:25.5 sec    CAPILLARY BLOOD GLUCOSE      POCT Blood Glucose.: 283 mg/dL (26 Jan 2023 07:59)  POCT Blood Glucose.: 432 mg/dL (25 Jan 2023 21:39)  POCT Blood Glucose.: 423 mg/dL (25 Jan 2023 21:38)  POCT Blood Glucose.: 348 mg/dL (25 Jan 2023 16:46)      Lower Extremity Physical Exam:  Vascular: DP/PT 1/4, B/L, CFT <3 seconds to R 3-5 toes and L 1-5, Temperature gradient warm to cool, B/L.   Neuro: Epicritic sensation diminished to the level of toes, B/L.  Musculoskeletal/Ortho: s/p 1/25 right foot TMA w/ LEAH  Skin: sutures intact, graft intact , no hematoma, no seroma,  minimal drainage, no acute SOI    RADIOLOGY & ADDITIONAL TESTS:

## 2023-01-26 NOTE — PROGRESS NOTE ADULT - PROBLEM SELECTOR PLAN 1
Pt with fever of 102.3, tachycardia of 101, likely R pedal source, MISA on CKD and metabolic/lactic acidosis meet criteria for severe sepsis. Pt normotensive at this time. Of note, pt on immunosuppression of prednisone, MMF for hepatic transplant. Hx of VRE bacteremia, CMV viremia  -Blood culture unclear if patient's was positive per lab  -Pt received vanc/cefepime x1 in ED  -zosyn from 1/23-1/25; switched to meropenem on 1/25 given sensitivities of wound culture for klebsiella   -Resuscitation w/ IVF as needed (500cc bolus ordered)  -Transplant ID consulted; f/u recs  -trend ESR  -f/u Urine culture Pt with fever of 102.3, tachycardia of 101, likely R pedal source, MISA on CKD and metabolic/lactic acidosis meet criteria for severe sepsis. Pt normotensive at this time. Of note, pt on immunosuppression of prednisone, MMF for hepatic transplant. Hx of VRE bacteremia, CMV viremia  -Blood culture unclear if patient's was positive per lab  -Pt received vanc/cefepime x1 in ED  -zosyn from 1/23-1/25; switched to ceftriaxone and metronidazole 1/25 -   -Resuscitation w/ IVF as needed (500cc bolus ordered)  -Transplant ID consulted; f/u recs  -trend ESR

## 2023-01-26 NOTE — PROGRESS NOTE ADULT - PROBLEM SELECTOR PLAN 2
XR of foot c/f osteomyelitis  -Podiatry consulted in ED, recs appreciated  -Wound Cx pending  -broad spectrum coverage incl. anaerobes w/ vanc (dosed by level)/zosyn  -MRI R foot showed signs of osteomyelitis; pending TMA by podiatry with possible biospy on 1/25

## 2023-01-26 NOTE — PROGRESS NOTE ADULT - ASSESSMENT
67 M s/p 1/25 R foot TMA w/ LEAH  pt seen and evaluated  sutures intact, graft intact , no hematoma, no seroma,  minimal drainage, no acute SOI  flaps warm and viable  low concern for residual infection, high concern for viability   pod plan for vac application tomorrow pending appearance   will follow  seen w/ attending

## 2023-01-26 NOTE — PROGRESS NOTE ADULT - PROBLEM SELECTOR PLAN 8
On lantus 14u nightly, admelog 9u TID  -c/w lantus 14u nightly, low ISS  -adjust insulin per SS requirements  -reduced lantus to 10 u prior to OR and NPO Pt wth JEAN cirrhosis, s/p OLT in 2020  -c/w prednisone, MMF, valganciclovir home medications  -valganciclovir discontinued because CMV negative

## 2023-01-26 NOTE — PHYSICAL THERAPY INITIAL EVALUATION ADULT - DIAGNOSIS, PT EVAL
Pt with decreased strength, endurance and balance leading to significant impairment in functional mobility.
Pt with decreased strength, endurance and balance leading to significant impairment in functional mobility.

## 2023-01-26 NOTE — PROGRESS NOTE ADULT - PROBLEM SELECTOR PLAN 6
Pt admitted with hyponatremia of 130 i/s/o increasing pain of RLE as well as MISA on CKD. Lower end of normal w/ correction given hyperglycemia of 342  -f/u AM BMP to trend Na  -can f/u with urine lytes should Na downtrend Pt with 5 day history of diarrhea (>10x/day), previous elevated CMV though pt currently on acycolvir. No further episodes since 1/24 AM  GI PCR negative; unlikely C Diff per RN and patient description of stool   -likely cause of metabolic acidosis

## 2023-01-26 NOTE — PROGRESS NOTE ADULT - SUBJECTIVE AND OBJECTIVE BOX
[facility]  Ramón Falk PA-C  [address] [place] [address]  [phone]      INTERVAL HPI/OVERNIGHT EVENTS:    patient s/e  events noted  no diarrhea     MEDICATIONS  (STANDING):  cefTRIAXone   IVPB 2000 milliGRAM(s) IV Intermittent every 24 hours  dextrose 5%. 1000 milliLiter(s) (50 mL/Hr) IV Continuous <Continuous>  dextrose 5%. 1000 milliLiter(s) (100 mL/Hr) IV Continuous <Continuous>  dextrose 50% Injectable 25 Gram(s) IV Push once  dextrose 50% Injectable 12.5 Gram(s) IV Push once  dextrose 50% Injectable 25 Gram(s) IV Push once  folic acid 1 milliGRAM(s) Oral daily  glucagon  Injectable 1 milliGRAM(s) IntraMuscular once  heparin   Injectable 5000 Unit(s) SubCutaneous every 8 hours  insulin glargine Injectable (LANTUS) 18 Unit(s) SubCutaneous at bedtime  insulin lispro (ADMELOG) corrective regimen sliding scale   SubCutaneous three times a day before meals  insulin lispro (ADMELOG) corrective regimen sliding scale   SubCutaneous at bedtime  insulin lispro Injectable (ADMELOG) 4 Unit(s) SubCutaneous three times a day before meals  lactated ringers. 1000 milliLiter(s) (75 mL/Hr) IV Continuous <Continuous>  lactobacillus acidophilus 1 Tablet(s) Oral daily  lidocaine   4% Patch 1 Patch Transdermal daily  metroNIDAZOLE    Tablet 500 milliGRAM(s) Oral two times a day  mycophenolate mofetil 500 milliGRAM(s) Oral two times a day  pantoprazole    Tablet 40 milliGRAM(s) Oral before breakfast  predniSONE   Tablet 20 milliGRAM(s) Oral daily  psyllium Powder 1 Packet(s) Oral daily    MEDICATIONS  (PRN):  acetaminophen     Tablet .. 650 milliGRAM(s) Oral every 6 hours PRN Temp greater or equal to 38C (100.4F), Mild Pain (1 - 3)  aluminum hydroxide/magnesium hydroxide/simethicone Suspension 30 milliLiter(s) Oral every 4 hours PRN Dyspepsia  dextrose Oral Gel 15 Gram(s) Oral once PRN Blood Glucose LESS THAN 70 milliGRAM(s)/deciliter  melatonin 3 milliGRAM(s) Oral at bedtime PRN Insomnia  ondansetron Injectable 4 milliGRAM(s) IV Push every 8 hours PRN Nausea and/or Vomiting  senna 2 Tablet(s) Oral at bedtime PRN Constipation      Allergies    codeine (Anaphylaxis)    Intolerances        ROS:   General:  No  fevers, chills, night sweats, fatigue,   Eyes:  Good vision, no reported pain  ENT:  No sore throat, pain, runny nose, dysphagia  CV:  No pain, palpitations, hypo/hypertension  Resp:  No dyspnea, cough, tachypnea, wheezing  GI:  No pain, No nausea, No vomiting, No diarrhea, No constipation, No weight loss, No fever, No pruritis, No rectal bleeding, No tarry stools, No dysphagia,  :  No pain, bleeding, incontinence, nocturia  Muscle:  No pain, weakness  Neuro:  No weakness, tingling, memory problems  Psych:  No fatigue, insomnia, mood problems, depression  Endocrine:  No polyuria, polydipsia, cold/heat intolerance  Heme:  No petechiae, ecchymosis, easy bruisability  Skin:  No rash, tattoos, scars, edema      PHYSICAL EXAM:   Vital Signs:  Vital Signs Last 24 Hrs  T(C): 36.4 (26 Jan 2023 10:24), Max: 37.2 (26 Jan 2023 00:39)  T(F): 97.6 (26 Jan 2023 10:24), Max: 98.9 (26 Jan 2023 00:39)  HR: 70 (26 Jan 2023 10:24) (62 - 70)  BP: 124/63 (26 Jan 2023 10:24) (106/62 - 124/63)  BP(mean): --  RR: 18 (26 Jan 2023 10:24) (18 - 18)  SpO2: 95% (26 Jan 2023 10:24) (95% - 96%)    Parameters below as of 26 Jan 2023 10:24  Patient On (Oxygen Delivery Method): room air      Daily     Daily     GENERAL:  Appears stated age,   HEENT:  NC/AT,    CHEST:  Full & symmetric excursion,   HEART:  Regular rhythm,  ABDOMEN:  Soft, non-tender, non-distended,  EXTEREMITIES:  no cyanosis  SKIN:  No rash  NEURO:  Alert,       LABS:                        8.4    8.27  )-----------( 412      ( 26 Jan 2023 10:22 )             26.4     01-26    133<L>  |  104  |  48<H>  ----------------------------<  281<H>  4.2   |  17<L>  |  2.03<H>    Ca    8.8      26 Jan 2023 10:22  Phos  3.1     01-26  Mg     1.9     01-26    TPro  6.4  /  Alb  2.7<L>  /  TBili  0.1<L>  /  DBili  x   /  AST  14  /  ALT  27  /  AlkPhos  149<H>  01-26    PT/INR - ( 25 Jan 2023 07:17 )   PT: 13.2 sec;   INR: 1.15 ratio         PTT - ( 25 Jan 2023 07:17 )  PTT:25.5 sec      RADIOLOGY & ADDITIONAL TESTS:

## 2023-01-26 NOTE — PHYSICAL THERAPY INITIAL EVALUATION ADULT - GENERAL OBSERVATIONS, REHAB EVAL
Pt received semi-supine in bed, A&Ox3, +groggy, keerthi 45 min PT eval.
Pt received semi-supine in bed, A&Ox3, keerthi 45 min PT re-eval.

## 2023-01-26 NOTE — PROGRESS NOTE ADULT - SUBJECTIVE AND OBJECTIVE BOX
Follow Up:      Interval History/ROS: Patient is a 67y old  Male who presents with a chief complaint of cellulitis vs osteomyelitis.  ID following for R foot OM.  Seen at bedside, ***    REVIEW OF SYSTEMS  [  ] ROS unobtainable because:    [ x ] All other systems negative except as noted below    Constitutional:  [ ] fever [ ] chills  [ ] weight loss  [ ]night sweat  [ ]poor appetite/PO intake [ ]fatigue   Skin:  [ ] rash [ ] phlebitis	  Eyes: [ ] icterus [ ] pain  [ ] discharge	  ENMT: [ ] sore throat  [ ] thrush [ ] ulcers [ ] exudates [ ]anosmia  Respiratory: [ ] dyspnea [ ] hemoptysis [ ] cough [ ] sputum	  Cardiovascular:  [ ] chest pain [ ] palpitations [ ] edema	  Gastrointestinal:  [ ] nausea [ ] vomiting [ ] diarrhea [ ] constipation [ ] pain	  Genitourinary:  [ ] dysuria [ ] frequency [ ] hematuria [ ] discharge [ ] flank pain  [ ] incontinence  Musculoskeletal:  [ ] myalgias [ ] arthralgias [ ] arthritis  [ ] back pain  Neurological:  [ ] headache [ ] weakness [ ] seizures  [ ] confusion/altered mental status    Allergies  codeine (Anaphylaxis)    ANTIMICROBIALS:    cefTRIAXone   IVPB 2000 every 24 hours  metroNIDAZOLE    Tablet 500 two times a day    OTHER MEDS: MEDICATIONS  (STANDING):  acetaminophen     Tablet .. 650 every 6 hours PRN  aluminum hydroxide/magnesium hydroxide/simethicone Suspension 30 every 4 hours PRN  dextrose 50% Injectable 25 once  dextrose 50% Injectable 12.5 once  dextrose 50% Injectable 25 once  dextrose Oral Gel 15 once PRN  glucagon  Injectable 1 once  heparin   Injectable 5000 every 8 hours  insulin glargine Injectable (LANTUS) 18 at bedtime  insulin lispro (ADMELOG) corrective regimen sliding scale  three times a day before meals  insulin lispro (ADMELOG) corrective regimen sliding scale  at bedtime  insulin lispro Injectable (ADMELOG) 4 three times a day before meals  melatonin 3 at bedtime PRN  mycophenolate mofetil 500 two times a day  ondansetron Injectable 4 every 8 hours PRN  pantoprazole    Tablet 40 before breakfast  predniSONE   Tablet 20 daily  psyllium Powder 1 daily  senna 2 at bedtime PRN    Vital Signs Last 24 Hrs  T(F): 97.6 (01-26-23 @ 10:24), Max: 102.3 (01-22-23 @ 21:05)    Vital Signs Last 24 Hrs  HR: 70 (01-26-23 @ 10:24) (54 - 70)  BP: 124/63 (01-26-23 @ 10:24) (106/62 - 124/63)  RR: 18 (01-26-23 @ 10:24)  SpO2: 95% (01-26-23 @ 10:24) (95% - 96%)  Wt(kg): --    EXAM:    GA: NAD  HEENT: oral cavity no lesion  CV: nl S1/S2, no RMG  Lungs: CTAB, no distress  Abd: BS+, soft, nontender, no rebounding pain  Ext: no edema  Neuro: No focal deficits  Skin: Intact  IV: no phlebitis    Labs:                        8.4    8.27  )-----------( 412      ( 26 Jan 2023 10:22 )             26.4     01-26    133<L>  |  104  |  48<H>  ----------------------------<  281<H>  4.2   |  17<L>  |  2.03<H>    Ca    8.8      26 Jan 2023 10:22  Phos  3.1     01-26  Mg     1.9     01-26    TPro  6.4  /  Alb  2.7<L>  /  TBili  0.1<L>  /  DBili  x   /  AST  14  /  ALT  27  /  AlkPhos  149<H>  01-26    WBC Trend:  WBC Count: 8.27 (01-26-23 @ 10:22)  WBC Count: 7.25 (01-25-23 @ 07:17)  WBC Count: 7.42 (01-24-23 @ 06:59)  WBC Count: 7.25 (01-23-23 @ 12:45)    Creatine Trend:  Creatinine, Serum: 2.03 (01-26)  Creatinine, Serum: 2.46 (01-25)  Creatinine, Serum: 2.32 (01-24)  Creatinine, Serum: 2.32 (01-23)    Liver Biochemical Testing Trend:  Alanine Aminotransferase (ALT/SGPT): 27 (01-26)  Alanine Aminotransferase (ALT/SGPT): 32 (01-25)  Alanine Aminotransferase (ALT/SGPT): 31 (01-24)  Alanine Aminotransferase (ALT/SGPT): 31 (01-23)  Alanine Aminotransferase (ALT/SGPT): 45 (01-22)  Aspartate Aminotransferase (AST/SGOT): 14 (01-26-23 @ 10:22)  Aspartate Aminotransferase (AST/SGOT): 25 (01-25-23 @ 07:17)  Aspartate Aminotransferase (AST/SGOT): 20 (01-24-23 @ 06:59)  Aspartate Aminotransferase (AST/SGOT): 22 (01-23-23 @ 12:45)  Aspartate Aminotransferase (AST/SGOT): 29 (01-22-23 @ 17:10)  Bilirubin Total, Serum: 0.1 (01-26)  Bilirubin Total, Serum: 0.2 (01-25)  Bilirubin Total, Serum: 0.2 (01-24)  Bilirubin Total, Serum: 0.4 (01-23)  Bilirubin Total, Serum: 0.7 (01-22)    Trend LDH  12-26-21 @ 07:31  260<H>  12-20-21 @ 12:42  334<H>  06-13-20 @ 07:29  164  06-11-20 @ 11:33  281<H>  04-29-20 @ 07:11  199    MICROBIOLOGY:    MRSA PCR Result.: NotDetec (12-27-22 @ 10:10)  MRSA PCR Result.: NotDetec (08-30-22 @ 11:26)  MRSA PCR Result.: NotDetec (06-29-22 @ 06:24)    Culture - Tissue with Gram Stain (collected 25 Jan 2023 15:36)  Source: .Tissue Other    Culture - Blood (collected 24 Jan 2023 07:09)  Source: .Blood Blood-Peripheral  Preliminary Report:    No growth to date.    Culture - Blood (collected 24 Jan 2023 07:09)  Source: .Blood Blood-Peripheral  Preliminary Report:    No growth to date.    Culture - Urine (collected 22 Jan 2023 21:11)  Source: Clean Catch Clean Catch (Midstream)  Final Report:    <10,000 CFU/mL Normal Urogenital Shantel    Culture - Abscess with Gram Stain (collected 22 Jan 2023 18:56)  Source: .Abscess Right foot  Final Report:    Few Klebsiella pneumoniae    Few Bacteroides thetaiotamcron group "Susceptibilities not performed"    Few Actinomyces odontolyticus "Susceptibilities not performed"    Few Streptococcus mitis/oralis group "Susceptibilities not performed"    Numerous Streptococcus agalactiae (Group B) isolated    Group B streptococci are susceptible to ampicillin,    penicillin and cefazolin, but may be resistant to    erythromycin and clindamycin.    Recommendations for intrapartum prophylaxis for Group B    streptococci are penicillin or ampicillin.  Organism: Klebsiella pneumoniae  Organism: Klebsiella pneumoniae    Sensitivities:      -  Amikacin: S <=16      -  Amoxicillin/Clavulanic Acid: I 16/8      -  Ampicillin: R >16 These ampicillin results predict results for amoxicillin      -  Ampicillin/Sulbactam: R >16/8 Enterobacter, Klebsiella aerogenes, Citrobacter, and Serratia may develop resistance during prolonged therapy (3-4 days)      -  Aztreonam: S <=4      -  Cefazolin: I 4 Enterobacter, Klebsiella aerogenes, Citrobacter, and Serratia may develop resistance during prolonged therapy (3-4 days)      -  Cefepime: S <=2      -  Cefoxitin: S <=8      -  Ceftriaxone: S <=1 Enterobacter, Klebsiella aerogenes, Citrobacter, and Serratia may develop resistance during prolonged therapy      -  Ciprofloxacin: S <=0.25      -  Ertapenem: S <=0.5      -  Gentamicin: S <=2      -  Imipenem: S <=1      -  Levofloxacin: S <=0.5      -  Meropenem: S <=1      -  Piperacillin/Tazobactam: R >64      -  Tobramycin: S <=2      -  Trimethoprim/Sulfamethoxazole: S <=0.5/9.5      Method Type: LEONARDO    Culture - Blood (collected 22 Jan 2023 17:10)  Source: .Blood Blood-Peripheral  Final Report:    **Please Note**: This is a Corrected Report**    Please disregard result, Bactec bottles mislabeled.    Previously reported as:    Growth in aerobic bottle: Gram positive cocci in pairs    ***Blood Panel PCR results on this specimen are available    approximately 3 hours after the Gram stain result.***    Gram stain, PCR, and/or culture results may not always    correspond due to difference in methodologies.    ************************************************************    This PCR assay was performed by multiplex PCR. This    Assay tests for 66 bacterial and resistance gene targets.    Please refer to the [facility] test directory    at [email or web address] for details.  Organism: Blood Culture PCR  Organism: Blood Culture PCR    Sensitivities:      -  Streptococcus agalactiae (Group B): Detec      Method Type: PCR    Culture - Blood (collected 22 Jan 2023 17:00)  Source: .Blood Blood-Peripheral  Final Report:    **Please Note**: This is a Corrected Report**    Please disregard report, Bactec bottles are mislabeled.    Previously reported as:    Growth in aerobic bottle: Gram Positive Cocci in Clusters    ***Blood Panel PCR results on this specimen are available    approximately 3 hours after the Gram stain result.***    Gram stain, PCR, and/or culture results may not always    correspond due to difference in methodologies.    ************************************************************    This PCR assay was performed by[employer]. This    Assay tests for 66 bacterial and resistance gene targets.    Please refer to the [facility] [employer] test directory    at [email or web address] for details.  Organism: Blood Culture PCR  Organism: Blood Culture PCR    Sensitivities:      -  Coagulase negative Staphylococcus: Detec      Method Type: PCR    Culture - Tissue with Gram Stain (collected 29 Dec 2022 13:56)  Source: .Tissue right foot 5th metatarsal  Final Report:    No growth at 5 days    Culture - Abscess with Gram Stain (collected 26 Dec 2022 22:03)  Source: .Abscess right foot  Final Report:    Rare Coag Negative Staphylococcus    Few Corynebacterium species    "Susceptibilities not performed"    Culture - Blood (collected 26 Dec 2022 19:55)  Source: .Blood Blood  Final Report:    No Growth Final            Cytomegalovirus By PCR:   NotDetec (01-23-23 @ 03:49)                COVID-19 PCR: NotDetec (01-24-23 @ 10:45)  COVID-19 PCR: NotDetec (12-28-22 @ 12:07)      Procalcitonin, Serum: 7.01 (01-23)    C-Reactive Protein, Serum: 338 (01-22)    Ferritin, Serum: 901 (01-25)              Sedimentation Rate, Erythrocyte: 108 mm/hr (01-24-23 @ 06:59)  Sedimentation Rate, Erythrocyte: 102 mm/hr (01-23-23 @ 03:49)  Sedimentation Rate, Erythrocyte: 120 mm/hr (01-22-23 @ 17:10)  Sedimentation Rate, Erythrocyte: 99 mm/hr (12-26-22 @ 20:08)  Sedimentation Rate, Erythrocyte: 90 mm/hr (11-03-22 @ 21:51)  Sedimentation Rate, Erythrocyte: 106 mm/hr (08-30-22 @ 04:09)  Sedimentation Rate, Erythrocyte: 35 mm/hr (06-19-22 @ 12:19)      RADIOLOGY:  imaging below personally reviewed                     Follow Up:      Interval History/ROS: Patient is a 67y old  Male who presents with a chief complaint of cellulitis vs osteomyelitis.  ID following for R foot OM.  Seen at bedside, feeling better, had 4 watery BM since am.     REVIEW OF SYSTEMS  [  ] ROS unobtainable because:    [ x ] All other systems negative except as noted below    Constitutional:  [ ] fever [ ] chills  [ ] weight loss  [ ]night sweat  [ ]poor appetite/PO intake [ ]fatigue   Skin:  [ ] rash [ ] phlebitis	  Eyes: [ ] icterus [ ] pain  [ ] discharge	  ENMT: [ ] sore throat  [ ] thrush [ ] ulcers [ ] exudates [ ]anosmia  Respiratory: [ ] dyspnea [ ] hemoptysis [ ] cough [ ] sputum	  Cardiovascular:  [ ] chest pain [ ] palpitations [ ] edema	  Gastrointestinal:  [ ] nausea [ ] vomiting [X] diarrhea [ ] constipation [ ] pain	  Genitourinary:  [ ] dysuria [ ] frequency [ ] hematuria [ ] discharge [ ] flank pain  [ ] incontinence  Musculoskeletal:  [ ] myalgias [ ] arthralgias [ ] arthritis  [ ] back pain  Neurological:  [ ] headache [ ] weakness [ ] seizures  [ ] confusion/altered mental status    Allergies  codeine (Anaphylaxis)    ANTIMICROBIALS:    cefTRIAXone   IVPB 2000 every 24 hours  metroNIDAZOLE    Tablet 500 two times a day    OTHER MEDS: MEDICATIONS  (STANDING):  acetaminophen     Tablet .. 650 every 6 hours PRN  aluminum hydroxide/magnesium hydroxide/simethicone Suspension 30 every 4 hours PRN  dextrose 50% Injectable 25 once  dextrose 50% Injectable 12.5 once  dextrose 50% Injectable 25 once  dextrose Oral Gel 15 once PRN  glucagon  Injectable 1 once  heparin   Injectable 5000 every 8 hours  insulin glargine Injectable (LANTUS) 18 at bedtime  insulin lispro (ADMELOG) corrective regimen sliding scale  three times a day before meals  insulin lispro (ADMELOG) corrective regimen sliding scale  at bedtime  insulin lispro Injectable (ADMELOG) 4 three times a day before meals  melatonin 3 at bedtime PRN  mycophenolate mofetil 500 two times a day  ondansetron Injectable 4 every 8 hours PRN  pantoprazole    Tablet 40 before breakfast  predniSONE   Tablet 20 daily  psyllium Powder 1 daily  senna 2 at bedtime PRN    Vital Signs Last 24 Hrs  T(F): 97.6 (01-26-23 @ 10:24), Max: 102.3 (01-22-23 @ 21:05)    Vital Signs Last 24 Hrs  HR: 70 (01-26-23 @ 10:24) (54 - 70)  BP: 124/63 (01-26-23 @ 10:24) (106/62 - 124/63)  RR: 18 (01-26-23 @ 10:24)  SpO2: 95% (01-26-23 @ 10:24) (95% - 96%)  Wt(kg): --    EXAM:    GA: NAD  HEENT: oral cavity no lesion  CV: nl S1/S2, no RMG  Lungs: CTAB, no distress  Abd: BS+, soft, nontender, no rebounding pain  Ext:s/p  R TMA covered with dressing  Neuro: No focal deficits  Skin: Intact  IV: no phlebitis    Labs:                        8.4    8.27  )-----------( 412      ( 26 Jan 2023 10:22 )             26.4     01-26    133<L>  |  104  |  48<H>  ----------------------------<  281<H>  4.2   |  17<L>  |  2.03<H>    Ca    8.8      26 Jan 2023 10:22  Phos  3.1     01-26  Mg     1.9     01-26    TPro  6.4  /  Alb  2.7<L>  /  TBili  0.1<L>  /  DBili  x   /  AST  14  /  ALT  27  /  AlkPhos  149<H>  01-26    WBC Trend:  WBC Count: 8.27 (01-26-23 @ 10:22)  WBC Count: 7.25 (01-25-23 @ 07:17)  WBC Count: 7.42 (01-24-23 @ 06:59)  WBC Count: 7.25 (01-23-23 @ 12:45)    Creatine Trend:  Creatinine, Serum: 2.03 (01-26)  Creatinine, Serum: 2.46 (01-25)  Creatinine, Serum: 2.32 (01-24)  Creatinine, Serum: 2.32 (01-23)    Liver Biochemical Testing Trend:  Alanine Aminotransferase (ALT/SGPT): 27 (01-26)  Alanine Aminotransferase (ALT/SGPT): 32 (01-25)  Alanine Aminotransferase (ALT/SGPT): 31 (01-24)  Alanine Aminotransferase (ALT/SGPT): 31 (01-23)  Alanine Aminotransferase (ALT/SGPT): 45 (01-22)  Aspartate Aminotransferase (AST/SGOT): 14 (01-26-23 @ 10:22)  Aspartate Aminotransferase (AST/SGOT): 25 (01-25-23 @ 07:17)  Aspartate Aminotransferase (AST/SGOT): 20 (01-24-23 @ 06:59)  Aspartate Aminotransferase (AST/SGOT): 22 (01-23-23 @ 12:45)  Aspartate Aminotransferase (AST/SGOT): 29 (01-22-23 @ 17:10)  Bilirubin Total, Serum: 0.1 (01-26)  Bilirubin Total, Serum: 0.2 (01-25)  Bilirubin Total, Serum: 0.2 (01-24)  Bilirubin Total, Serum: 0.4 (01-23)  Bilirubin Total, Serum: 0.7 (01-22)    Trend LDH  12-26-21 @ 07:31  260<H>  12-20-21 @ 12:42  334<H>  06-13-20 @ 07:29  164  06-11-20 @ 11:33  281<H>  04-29-20 @ 07:11  199    MICROBIOLOGY:    MRSA PCR Result.: NotDetec (12-27-22 @ 10:10)  MRSA PCR Result.: NotDetec (08-30-22 @ 11:26)  MRSA PCR Result.: NotDetec (06-29-22 @ 06:24)    Culture - Tissue with Gram Stain (collected 25 Jan 2023 15:36)  Source: .Tissue Other    Culture - Blood (collected 24 Jan 2023 07:09)  Source: .Blood Blood-Peripheral  Preliminary Report:    No growth to date.    Culture - Blood (collected 24 Jan 2023 07:09)  Source: .Blood Blood-Peripheral  Preliminary Report:    No growth to date.    Culture - Urine (collected 22 Jan 2023 21:11)  Source: Clean Catch Clean Catch (Midstream)  Final Report:    <10,000 CFU/mL Normal Urogenital Shantel    Culture - Abscess with Gram Stain (collected 22 Jan 2023 18:56)  Source: .Abscess Right foot  Final Report:    Few Klebsiella pneumoniae    Few Bacteroides thetaiotamcron group "Susceptibilities not performed"    Few Actinomyces odontolyticus "Susceptibilities not performed"    Few Streptococcus mitis/oralis group "Susceptibilities not performed"    Numerous Streptococcus agalactiae (Group B) isolated    Group B streptococci are susceptible to ampicillin,    penicillin and cefazolin, but may be resistant to    erythromycin and clindamycin.    Recommendations for intrapartum prophylaxis for Group B    streptococci are penicillin or ampicillin.  Organism: Klebsiella pneumoniae  Organism: Klebsiella pneumoniae    Sensitivities:      -  Amikacin: S <=16      -  Amoxicillin/Clavulanic Acid: I 16/8      -  Ampicillin: R >16 These ampicillin results predict results for amoxicillin      -  Ampicillin/Sulbactam: R >16/8 Enterobacter, Klebsiella aerogenes, Citrobacter, and Serratia may develop resistance during prolonged therapy (3-4 days)      -  Aztreonam: S <=4      -  Cefazolin: I 4 Enterobacter, Klebsiella aerogenes, Citrobacter, and Serratia may develop resistance during prolonged therapy (3-4 days)      -  Cefepime: S <=2      -  Cefoxitin: S <=8      -  Ceftriaxone: S <=1 Enterobacter, Klebsiella aerogenes, Citrobacter, and Serratia may develop resistance during prolonged therapy      -  Ciprofloxacin: S <=0.25      -  Ertapenem: S <=0.5      -  Gentamicin: S <=2      -  Imipenem: S <=1      -  Levofloxacin: S <=0.5      -  Meropenem: S <=1      -  Piperacillin/Tazobactam: R >64      -  Tobramycin: S <=2      -  Trimethoprim/Sulfamethoxazole: S <=0.5/9.5      Method Type: LEONARDO    Culture - Blood (collected 22 Jan 2023 17:10)  Source: .Blood Blood-Peripheral  Final Report:    **Please Note**: This is a Corrected Report**    Please disregard result, Bactec bottles mislabeled.    Previously reported as:    Growth in aerobic bottle: Gram positive cocci in pairs    ***Blood Panel PCR results on this specimen are available    approximately 3 hours after the Gram stain result.***    Gram stain, PCR, and/or culture results may not always    correspond due to difference in methodologies.    ************************************************************    This PCR assay was performed by multiplex PCR. This    Assay tests for 66 bacterial and resistance gene targets.    Please refer to the [facility] [employer] test directory    at [email or web address] for details.  Organism: Blood Culture PCR  Organism: Blood Culture PCR    Sensitivities:      -  Streptococcus agalactiae (Group B): Detec      Method Type: PCR    Culture - Blood (collected 22 Jan 2023 17:00)  Source: .Blood Blood-Peripheral  Final Report:    **Please Note**: This is a Corrected Report**    Please disregard report, Bactec bottles are mislabeled.    Previously reported as:    Growth in aerobic bottle: Gram Positive Cocci in Clusters    ***Blood Panel PCR results on this specimen are available    approximately 3 hours after the Gram stain result.***    Gram stain, PCR, and/or culture results may not always    correspond due to difference in methodologies.    ************************************************************    This PCR assay was performed by[employer]. This    Assay tests for 66 bacterial and resistance gene targets.    Please refer to the [employer] test directory    at [email or web address] for details.  Organism: Blood Culture PCR  Organism: Blood Culture PCR    Sensitivities:      -  Coagulase negative Staphylococcus: Detec      Method Type: PCR    Culture - Tissue with Gram Stain (collected 29 Dec 2022 13:56)  Source: .Tissue right foot 5th metatarsal  Final Report:    No growth at 5 days    Culture - Abscess with Gram Stain (collected 26 Dec 2022 22:03)  Source: .Abscess right foot  Final Report:    Rare Coag Negative Staphylococcus    Few Corynebacterium species    "Susceptibilities not performed"    Culture - Blood (collected 26 Dec 2022 19:55)  Source: .Blood Blood  Final Report:    No Growth Final    Cytomegalovirus By PCR:   NotDetec (01-23-23 @ 03:49)    COVID-19 PCR: NotDetec (01-24-23 @ 10:45)  COVID-19 PCR: NotDetec (12-28-22 @ 12:07)    Procalcitonin, Serum: 7.01 (01-23)    C-Reactive Protein, Serum: 338 (01-22)    Ferritin, Serum: 901 (01-25)    Sedimentation Rate, Erythrocyte: 108 mm/hr (01-24-23 @ 06:59)  Sedimentation Rate, Erythrocyte: 102 mm/hr (01-23-23 @ 03:49)  Sedimentation Rate, Erythrocyte: 120 mm/hr (01-22-23 @ 17:10)  Sedimentation Rate, Erythrocyte: 99 mm/hr (12-26-22 @ 20:08)  Sedimentation Rate, Erythrocyte: 90 mm/hr (11-03-22 @ 21:51)  Sedimentation Rate, Erythrocyte: 106 mm/hr (08-30-22 @ 04:09)  Sedimentation Rate, Erythrocyte: 35 mm/hr (06-19-22 @ 12:19)    RADIOLOGY:  imaging below personally reviewed    < from: Xray Foot AP + Lateral + Oblique, Right (01.25.23 @ 10:22) >    IMPRESSION:  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 Elliptical configuration surgical   skin staples over lateral aspect of the forefoot/midfoot related to graft   placement.    Soft tissue lucencies in distal Achilles region apparent on lateral view   probably correlate with concurrently performed LEAH procedure. Otherwise,   no proximally tracking gas collections beyond the amputation margins and   no focal areas of osteomyelitis.    Remainder of foot unchanged. Also correlate with intraoperative findings.    --- End of Report ---      < end of copied text >

## 2023-01-26 NOTE — PROGRESS NOTE ADULT - ATTENDING COMMENTS
68 yo M PMH DM, liver tx on immunosuppression a/w cellulitis with suspected OM and sepsis with MISA.     #Sepsis  #NAGMA - possibly 2/2 diarrhea  #MISA on CKD stage 3 - likely ATN from sepsis, with pre-renal component from diarrhea  #Anemia of chronic disease   #Uncontrolled DM    - Some soft stool but not watery - not meeting c-diff. Can add lactobacillus and metamucil. GI PCR neg   - ID following, continue ceftriaxone and flagyl. f/u OR culture - growing few GPC  - Podiatry has low c/f residual infection - high probability of viability. To consider wound vac tomorrow   - bcx 1/24 NTD  - Cr improved after IVF   - Adjust insulin to lantus 18u and premeal 4u     Rest as above.

## 2023-01-26 NOTE — PHYSICAL THERAPY INITIAL EVALUATION ADULT - TRANSFER TRAINING, PT EVAL
GOAL: Pt will perform ALL transfers with CGA, w/use of appropriate assistive device as needed, in 4 weeks.
GOAL: Pt will perform ALL transfers with modA, w/use of appropriate assistive device as needed, in 4 weeks.

## 2023-01-26 NOTE — PROGRESS NOTE ADULT - PROBLEM SELECTOR PLAN 10
DVT PPx: subQ hep; hold for OR  Diet: consistent carb  Code status: Full code  Dispo: [facility]  PCP: Dr. Dillan Coto  Pharmacy: [facility]

## 2023-01-26 NOTE — PROGRESS NOTE ADULT - ASSESSMENT
osteomyelitis   diarrhea    s/p OR  GI pcr negative  cmv negative  valcyte dced by ID  monitor GI fn  diet as tolerated  transplant ID and hepatology following  d/w patient

## 2023-01-26 NOTE — PROGRESS NOTE ADULT - ASSESSMENT
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    Febrile on 1/23: T max 101.6F  No leukocytosis     -R Foot xray on 1/22:  mottled appearance of the stump margin of the fifth metatarsal concerning for osteomyelitis, surrounding soft tissue edema and air.   -Podiatry impression: 5th metatarsal wound site open to bone, fibrogranular, graft incorporating, retained staple noted, mild serous drainage, erythema to the level of the ankle, excess warmth noted to the global foot. R foot wound Cx sent by podiatry   -MRI R foot on 1/23: OM of 4th metatarsal and 4th proximal phalanx, signal abnormality at the distal margin of the remaining first proximal phalanx ?OM, possible OM in remaining distal 5th metatarsal, Large adjacent lateral ulceration with soft tissue infection. Joint effusion at the tarsometatarsal joints and at the navicular middle cuneiform articulation, ?joint infection.     Blood Cx on 1/22: CoNS (1/4 bottles)   Of note, there was no growth of GBS on blood cultures, this was an error from the microbiology lab  R foot Wound Cx on 1/22: Klebsiella pneumonia and Strep B   UA on 1/22: WBC >50, neg bacteria   Urine Cx on 1/22: NF    Antimicrobials:   Vanc 1/22  Cefepime 1/22  Zosyn (1/23-->)    #OM 4th metatarsal and proximal phalanx, ?OM remaining 5th and 1st proximal phalanx - s/p R TMA on 1/25  #Positive Culture Finding (Wound Culture) - polymicrobial   #Immunosuppressed, liver transplant recipient   #CMV Viremia -cleared  #Diarrhea    Recommendations:   -s/p R TMA on 1/25 with low suspicion for residual infection   -Continue Ceftriaxone 2 g IV daily with Flagyl 500 mg q 12hrs  -GI PCR Negative (1/23) If continued diarrhea recommend C diff toxin assay  -Repeat blood Cx sent on 1/24 NGTD - f/up final report  -clean margin bone Cx : few GPC in pairs, f/up final speciation and sensitivity  -Valcyte discontinued, CMV PCR neg X2   -Monitor T curve and WBC trend                67 year old Male with PMHx of obesity, IDDM, HFpEF (EF 65% as of 12/2021), CKD, 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    Febrile on 1/23: T max 101.6F  No leukocytosis     -R Foot xray on 1/22:  mottled appearance of the stump margin of the fifth metatarsal concerning for osteomyelitis, surrounding soft tissue edema and air.   -Podiatry impression: 5th metatarsal wound site open to bone, fibrogranular, graft incorporating, retained staple noted, mild serous drainage, erythema to the level of the ankle, excess warmth noted to the global foot. R foot wound Cx sent by podiatry   -MRI R foot on 1/23: OM of 4th metatarsal and 4th proximal phalanx, signal abnormality at the distal margin of the remaining first proximal phalanx ?OM, possible OM in remaining distal 5th metatarsal, Large adjacent lateral ulceration with soft tissue infection. Joint effusion at the tarsometatarsal joints and at the navicular middle cuneiform articulation, ?joint infection.     Blood Cx on 1/22: CoNS (1/4 bottles)   Of note, there was no growth of GBS on blood cultures, this was an error from the microbiology lab  R foot Wound Cx on 1/22: Klebsiella pneumonia and Strep B   UA on 1/22: WBC >50, neg bacteria   Urine Cx on 1/22: NF    Antimicrobials:   Vanc 1/22  Cefepime 1/22  Zosyn (1/23-->)    #OM 4th metatarsal and proximal phalanx, ?OM remaining 5th and 1st proximal phalanx - s/p R TMA on 1/25  #Positive Culture Finding (Wound Culture) - polymicrobial   #Immunosuppressed, liver transplant recipient   #CMV Viremia -cleared  #Diarrhea    Recommendations:   -s/p R TMA on 1/25 with low suspicion for residual infection   -Continue Ceftriaxone 2 g IV daily with Flagyl 500 mg q 12hrs  -continues to have watery diarrhea, send C diff toxin assay  -Repeat blood Cx sent on 1/24 NGTD - f/up final report  -clean margin bone Cx : few GPC in pairs, f/up final speciation and sensitivity  -Valcyte discontinued, CMV PCR neg X2   -Monitor T curve and WBC trend     Hind Eduin Arias MD, PGY4  ID fellow  [employer] Preferred  After 5pm/weekends call [phone]                 67 year old Male with PMHx of obesity, IDDM, HFpEF (EF 65% as of 12/2021), CKD, 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    Febrile on 1/23: T max 101.6F  No leukocytosis     -R Foot xray on 1/22:  mottled appearance of the stump margin of the fifth metatarsal concerning for osteomyelitis, surrounding soft tissue edema and air.   -Podiatry impression: 5th metatarsal wound site open to bone, fibrogranular, graft incorporating, retained staple noted, mild serous drainage, erythema to the level of the ankle, excess warmth noted to the global foot. R foot wound Cx sent by podiatry   -MRI R foot on 1/23: OM of 4th metatarsal and 4th proximal phalanx, signal abnormality at the distal margin of the remaining first proximal phalanx ?OM, possible OM in remaining distal 5th metatarsal, Large adjacent lateral ulceration with soft tissue infection. Joint effusion at the tarsometatarsal joints and at the navicular middle cuneiform articulation, ?joint infection.     Blood Cx on 1/22: CoNS (1/4 bottles)   Of note, there was no growth of GBS on blood cultures, this was an error from the microbiology lab  R foot Wound Cx on 1/22: Klebsiella pneumonia and Strep B   UA on 1/22: WBC >50, neg bacteria   Urine Cx on 1/22: NF    Antimicrobials:   Vanc 1/22  Cefepime 1/22  Zosyn (1/23-->)    #OM 4th metatarsal and proximal phalanx, ?OM remaining 5th and 1st proximal phalanx - s/p R TMA on 1/25  #Positive Culture Finding (Wound Culture) - polymicrobial   #Immunosuppressed, liver transplant recipient   #CMV Viremia -cleared  #Diarrhea    Recommendations:   -s/p R TMA on 1/25 with low suspicion for residual infection   -Continue Ceftriaxone 2 g IV daily with Flagyl 500 mg q 12hrs  -if continued diarrhea would send C diff toxin assay  -Repeat blood Cx sent on 1/24 NGTD - f/up final report  -clean margin bone Cx : few GPC in pairs, f/up final speciation and sensitivity  -Valcyte discontinued, CMV PCR neg X2   -Monitor T curve and WBC trend     Hind Eduin Arias MD, PGY4  ID fellow  [employer] Teams Preferred  After 5pm/weekends call [phone]

## 2023-01-26 NOTE — PHYSICAL THERAPY INITIAL EVALUATION ADULT - STRENGTHENING, PT EVAL
GOAL: Pt will improve bilateral LE strength to 4/5, for increased limb stability, to improve gait in 4 weeks.
GOAL: Pt will improve bilateral LE strength to 4/5, for increased limb stability, to improve gait in 4 weeks.

## 2023-01-26 NOTE — PHYSICAL THERAPY INITIAL EVALUATION ADULT - MANUAL MUSCLE TESTING RESULTS, REHAB EVAL
Bilateral UE & LE grossly 3/5./grossly assessed due to
Bilateral UE & LE grossly 3/5./grossly assessed due to

## 2023-01-27 NOTE — PROGRESS NOTE ADULT - ATTENDING COMMENTS
66 yo M PMH DM, liver tx on immunosuppression a/w cellulitis with suspected OM and sepsis with MISA.     #Sepsis  #NAGMA - possibly 2/2 diarrhea  #MISA on CKD stage 3 - likely ATN from sepsis, with pre-renal component from diarrhea  #Anemia of chronic disease   #Uncontrolled DM    - no diarrhea today   - ID following, continue ceftriaxone and flagyl. f/u OR culture - growing few GPC  - Podiatry has low c/f residual infection - high probability of viability  - bcx 1/24 NTD    Dispo: [facility]  Rest as above.

## 2023-01-27 NOTE — PROGRESS NOTE ADULT - PROBLEM SELECTOR PLAN 3
Pt with known CKD, b/l SCr 1.6-1.8 (1.89 on discharge 3 weeks prior to admission 12/30/2022), now with SCr of 2.32 c/f ATN i/s/o possible osteomyelitis  -Urine lytes c/w pre-renal; trial IVF  -F/u AM BMP  -monitor urine output

## 2023-01-27 NOTE — CHART NOTE - NSCHARTNOTEFT_GEN_A_CORE
Wound Care Team Note:    Request for wound care consult for foot wounds received. Wound care consult referred to Wound Care Team Podiatrists, Guerita Donato/Kathleen/Jazmín. Will defer to podiatry for management. Please refer any questions/concerns to Podiatry. Will not follow.    Tami Monroe, [id number], CWOCN [id number]
Order received from podiatry resident to fit patient with cambbot to be used after surgery.  S/P TMA with LEAH right foot.  Camboot applied after removal of posterior splint.  VAC being applied by PT now.  Follow up after discharge at wound center  Pt instructed to leonie Marrufo [facility]  [phone]

## 2023-01-27 NOTE — PROGRESS NOTE ADULT - PROBLEM SELECTOR PLAN 7
On lantus 14u nightly, admelog 9u TID  -lantus 18 units and lispro 4 u here   -adjust insulin per SS requirements  -reduced lantus to 10 u prior to OR and NPO

## 2023-01-27 NOTE — PROGRESS NOTE ADULT - SUBJECTIVE AND OBJECTIVE BOX
Chief Complaint:  Patient is a 67y old  Male who presents with a chief complaint of cellulitis vs ostemolyelitis (27 Jan 2023 13:42)      Interval Events:   s/p R ft transmetatarsal amputation with tendoachilles lengthening and 5th ray resection with posterior tibial tenotomy  GI PCR/CMV negative  Valcyte held by Transplant ID    Hospital Medications:  acetaminophen     Tablet .. 650 milliGRAM(s) Oral every 6 hours PRN  aluminum hydroxide/magnesium hydroxide/simethicone Suspension 30 milliLiter(s) Oral every 4 hours PRN  cefTRIAXone   IVPB 2000 milliGRAM(s) IV Intermittent every 24 hours  dextrose 5%. 1000 milliLiter(s) IV Continuous <Continuous>  dextrose 5%. 1000 milliLiter(s) IV Continuous <Continuous>  dextrose 50% Injectable 25 Gram(s) IV Push once  dextrose 50% Injectable 12.5 Gram(s) IV Push once  dextrose 50% Injectable 25 Gram(s) IV Push once  dextrose Oral Gel 15 Gram(s) Oral once PRN  folic acid 1 milliGRAM(s) Oral daily  glucagon  Injectable 1 milliGRAM(s) IntraMuscular once  heparin   Injectable 5000 Unit(s) SubCutaneous every 8 hours  insulin glargine Injectable (LANTUS) 23 Unit(s) SubCutaneous at bedtime  insulin lispro (ADMELOG) corrective regimen sliding scale   SubCutaneous three times a day before meals  insulin lispro (ADMELOG) corrective regimen sliding scale   SubCutaneous at bedtime  insulin lispro Injectable (ADMELOG) 6 Unit(s) SubCutaneous three times a day before meals  lactated ringers. 1000 milliLiter(s) IV Continuous <Continuous>  lactobacillus acidophilus 1 Tablet(s) Oral daily  lidocaine   4% Patch 1 Patch Transdermal daily  melatonin 3 milliGRAM(s) Oral at bedtime PRN  metroNIDAZOLE    Tablet 500 milliGRAM(s) Oral two times a day  mycophenolate mofetil 500 milliGRAM(s) Oral two times a day  ondansetron Injectable 4 milliGRAM(s) IV Push every 8 hours PRN  pantoprazole    Tablet 40 milliGRAM(s) Oral before breakfast  predniSONE   Tablet 20 milliGRAM(s) Oral daily  psyllium Powder 1 Packet(s) Oral daily  senna 2 Tablet(s) Oral at bedtime PRN      ROS:   Complete and normal except as mentioned above.    PHYSICAL EXAM:   Vital Signs:  Vital Signs Last 24 Hrs  T(C): 36.6 (27 Jan 2023 09:04), Max: 36.6 (27 Jan 2023 09:04)  T(F): 97.9 (27 Jan 2023 09:04), Max: 97.9 (27 Jan 2023 09:04)  HR: 61 (27 Jan 2023 09:04) (61 - 66)  BP: 112/53 (27 Jan 2023 09:04) (112/53 - 128/57)  BP(mean): --  RR: 18 (27 Jan 2023 09:04) (18 - 18)  SpO2: 96% (27 Jan 2023 09:04) (95% - 96%)    Parameters below as of 27 Jan 2023 09:04  Patient On (Oxygen Delivery Method): room air      Daily     Daily     GENERAL: no acute distress  NEURO: alert  HEENT: anicteric sclera, no conjunctival pallor appreciated  CHEST: no respiratory distress, no accessory muscle use  CARDIAC: regular rate, rhythm  ABDOMEN: soft, non-tender, non-distended, no rebound or guarding  EXTREMITIES: Rt foot partial amputation    SKIN: no lesions noted      LABS: reviewed                        8.6    8.78  )-----------( 467      ( 27 Jan 2023 08:34 )             27.8     01-27    138  |  107  |  44<H>  ----------------------------<  179<H>  4.0   |  23  |  1.99<H>    Ca    8.8      27 Jan 2023 08:34  Phos  3.2     01-27  Mg     1.9     01-27    TPro  6.1  /  Alb  2.6<L>  /  TBili  0.1<L>  /  DBili  x   /  AST  16  /  ALT  24  /  AlkPhos  134<H>  01-27    LIVER FUNCTIONS - ( 27 Jan 2023 08:34 )  Alb: 2.6 g/dL / Pro: 6.1 g/dL / ALK PHOS: 134 U/L / ALT: 24 U/L / AST: 16 U/L / GGT: x             Interval Diagnostic Studies: see sunrise for full report

## 2023-01-27 NOTE — PROGRESS NOTE ADULT - SUBJECTIVE AND OBJECTIVE BOX
Follow Up:      Interval History/ROS:Patient is a 67y old  Male who presents with a chief complaint of cellulitis vs ostemolyelitis.  ID following for R foot OM.  Seen at bedside, no new complaints, no diarrhea today.     REVIEW OF SYSTEMS  [  ] ROS unobtainable because:    [ x ] All other systems negative except as noted below    Constitutional:  [ ] fever [ ] chills  [ ] weight loss  [ ]night sweat  [X]poor appetite/PO intake [ ]fatigue   Skin:  [ ] rash [ ] phlebitis	  Eyes: [ ] icterus [ ] pain  [ ] discharge	  ENMT: [ ] sore throat  [ ] thrush [ ] ulcers [ ] exudates [ ]anosmia  Respiratory: [ ] dyspnea [ ] hemoptysis [ ] cough [ ] sputum	  Cardiovascular:  [ ] chest pain [ ] palpitations [ ] edema	  Gastrointestinal:  [ ] nausea [ ] vomiting [ ] diarrhea [ ] constipation [ ] pain	  Genitourinary:  [ ] dysuria [ ] frequency [ ] hematuria [ ] discharge [ ] flank pain  [ ] incontinence  Musculoskeletal:  [ ] myalgias [ ] arthralgias [ ] arthritis  [ ] back pain  Neurological:  [ ] headache [ ] weakness [ ] seizures  [ ] confusion/altered mental status    Allergies  codeine (Anaphylaxis)    ANTIMICROBIALS:    cefTRIAXone   IVPB 2000 every 24 hours  metroNIDAZOLE    Tablet 500 two times a day    OTHER MEDS: MEDICATIONS  (STANDING):  acetaminophen     Tablet .. 650 every 6 hours PRN  aluminum hydroxide/magnesium hydroxide/simethicone Suspension 30 every 4 hours PRN  dextrose 50% Injectable 25 once  dextrose 50% Injectable 12.5 once  dextrose 50% Injectable 25 once  dextrose Oral Gel 15 once PRN  glucagon  Injectable 1 once  heparin   Injectable 5000 every 8 hours  insulin glargine Injectable (LANTUS) 23 at bedtime  insulin lispro (ADMELOG) corrective regimen sliding scale  three times a day before meals  insulin lispro (ADMELOG) corrective regimen sliding scale  at bedtime  insulin lispro Injectable (ADMELOG) 6 three times a day before meals  melatonin 3 at bedtime PRN  mycophenolate mofetil 500 two times a day  ondansetron Injectable 4 every 8 hours PRN  pantoprazole    Tablet 40 before breakfast  predniSONE   Tablet 20 daily  psyllium Powder 1 daily  senna 2 at bedtime PRN    Vital Signs Last 24 Hrs  T(F): 97.9 (01-27-23 @ 09:04), Max: 102.3 (01-22-23 @ 21:05)    Vital Signs Last 24 Hrs  HR: 61 (01-27-23 @ 09:04) (61 - 66)  BP: 112/53 (01-27-23 @ 09:04) (112/53 - 128/57)  RR: 18 (01-27-23 @ 09:04)  SpO2: 96% (01-27-23 @ 09:04) (95% - 96%)  Wt(kg): --    EXAM:    GA: NAD  HEENT: oral cavity no lesion  CV: nl S1/S2, no RMG  Lungs: CTAB, no distress  Abd: BS+, soft, nontender, no rebounding pain  Ext:s/p  R TMA covered with dressing  Neuro: No focal deficits  Skin: Intact  IV: no phlebitis    Labs:                        8.6    8.78  )-----------( 467      ( 27 Jan 2023 08:34 )             27.8     01-27    138  |  107  |  44<H>  ----------------------------<  179<H>  4.0   |  23  |  1.99<H>    Ca    8.8      27 Jan 2023 08:34  Phos  3.2     01-27  Mg     1.9     01-27    TPro  6.1  /  Alb  2.6<L>  /  TBili  0.1<L>  /  DBili  x   /  AST  16  /  ALT  24  /  AlkPhos  134<H>  01-27    WBC Trend:  WBC Count: 8.78 (01-27-23 @ 08:34)  WBC Count: 8.27 (01-26-23 @ 10:22)  WBC Count: 7.25 (01-25-23 @ 07:17)  WBC Count: 7.42 (01-24-23 @ 06:59)    Creatine Trend:  Creatinine, Serum: 1.99 (01-27)  Creatinine, Serum: 2.03 (01-26)  Creatinine, Serum: 2.46 (01-25)  Creatinine, Serum: 2.32 (01-24)    Liver Biochemical Testing Trend:  Alanine Aminotransferase (ALT/SGPT): 24 (01-27)  Alanine Aminotransferase (ALT/SGPT): 27 (01-26)  Alanine Aminotransferase (ALT/SGPT): 32 (01-25)  Alanine Aminotransferase (ALT/SGPT): 31 (01-24)  Alanine Aminotransferase (ALT/SGPT): 31 (01-23)  Aspartate Aminotransferase (AST/SGOT): 16 (01-27-23 @ 08:34)  Aspartate Aminotransferase (AST/SGOT): 14 (01-26-23 @ 10:22)  Aspartate Aminotransferase (AST/SGOT): 25 (01-25-23 @ 07:17)  Aspartate Aminotransferase (AST/SGOT): 20 (01-24-23 @ 06:59)  Aspartate Aminotransferase (AST/SGOT): 22 (01-23-23 @ 12:45)  Bilirubin Total, Serum: 0.1 (01-27)  Bilirubin Total, Serum: 0.1 (01-26)  Bilirubin Total, Serum: 0.2 (01-25)  Bilirubin Total, Serum: 0.2 (01-24)  Bilirubin Total, Serum: 0.4 (01-23)    Trend LDH  12-26-21 @ 07:31  260<H>  12-20-21 @ 12:42  334<H>  06-13-20 @ 07:29  164  06-11-20 @ 11:33  281<H>  04-29-20 @ 07:11  199    MICROBIOLOGY:    MRSA PCR Result.: NotDetec (12-27-22 @ 10:10)  MRSA PCR Result.: NotDetec (08-30-22 @ 11:26)  MRSA PCR Result.: NotDetec (06-29-22 @ 06:24)    Culture - Tissue with Gram Stain (collected 25 Jan 2023 15:36)  Source: .Tissue Other  Preliminary Report:    No growth    Culture - Blood (collected 24 Jan 2023 07:09)  Source: .Blood Blood-Peripheral  Preliminary Report:    No growth to date.    Culture - Blood (collected 24 Jan 2023 07:09)  Source: .Blood Blood-Peripheral  Preliminary Report:    No growth to date.    Culture - Urine (collected 22 Jan 2023 21:11)  Source: Clean Catch Clean Catch (Midstream)  Final Report:    <10,000 CFU/mL Normal Urogenital Shantel    Culture - Abscess with Gram Stain (collected 22 Jan 2023 18:56)  Source: .Abscess Right foot  Final Report:    Few Klebsiella pneumoniae    Few Bacteroides thetaiotamcron group "Susceptibilities not performed"    Few Actinomyces odontolyticus "Susceptibilities not performed"    Few Streptococcus mitis/oralis group "Susceptibilities not performed"    Numerous Streptococcus agalactiae (Group B) isolated    Group B streptococci are susceptible to ampicillin,    penicillin and cefazolin, but may be resistant to    erythromycin and clindamycin.    Recommendations for intrapartum prophylaxis for Group B    streptococci are penicillin or ampicillin.  Organism: Klebsiella pneumoniae  Organism: Klebsiella pneumoniae    Sensitivities:      -  Amikacin: S <=16      -  Amoxicillin/Clavulanic Acid: I 16/8      -  Ampicillin: R >16 These ampicillin results predict results for amoxicillin      -  Ampicillin/Sulbactam: R >16/8 Enterobacter, Klebsiella aerogenes, Citrobacter, and Serratia may develop resistance during prolonged therapy (3-4 days)      -  Aztreonam: S <=4      -  Cefazolin: I 4 Enterobacter, Klebsiella aerogenes, Citrobacter, and Serratia may develop resistance during prolonged therapy (3-4 days)      -  Cefepime: S <=2      -  Cefoxitin: S <=8      -  Ceftriaxone: S <=1 Enterobacter, Klebsiella aerogenes, Citrobacter, and Serratia may develop resistance during prolonged therapy      -  Ciprofloxacin: S <=0.25      -  Ertapenem: S <=0.5      -  Gentamicin: S <=2      -  Imipenem: S <=1      -  Levofloxacin: S <=0.5      -  Meropenem: S <=1      -  Piperacillin/Tazobactam: R >64      -  Tobramycin: S <=2      -  Trimethoprim/Sulfamethoxazole: S <=0.5/9.5      Method Type: LEONARDO    Culture - Blood (collected 22 Jan 2023 17:10)  Source: .Blood Blood-Peripheral  Final Report:    **Please Note**: This is a Corrected Report**    Please disregard result, Bactec bottles mislabeled.    Previously reported as:    Growth in aerobic bottle: Gram positive cocci in pairs    ***Blood Panel PCR results on this specimen are available    approximately 3 hours after the Gram stain result.***    Gram stain, PCR, and/or culture results may not always    correspond due to difference in methodologies.    ************************************************************    This PCR assay was performed by multiplex PCR. This    Assay tests for 66 bacterial and resistance gene targets.    Please refer to the [facility] [employer] test directory    at [email or web address] for details.  Organism: Blood Culture PCR  Organism: Blood Culture PCR    Sensitivities:      -  Streptococcus agalactiae (Group B): Detec      Method Type: PCR    Culture - Blood (collected 22 Jan 2023 17:00)  Source: .Blood Blood-Peripheral  Final Report:    **Please Note**: This is a Corrected Report**    Please disregard report, Bactec bottles are mislabeled.    Previously reported as:    Growth in aerobic bottle: Gram Positive Cocci in Clusters    ***Blood Panel PCR results on this specimen are available    approximately 3 hours after the Gram stain result.***    Gram stain, PCR, and/or culture results may not always    correspond due to difference in methodologies.    ************************************************************    This PCR assay was performed bymultiplex PCR. This    Assay tests for 66 bacterial and resistance gene targets.    Please refer to the [employer] test directory    at [email or web address] for details.  Organism: Blood Culture PCR  Organism: Blood Culture PCR    Sensitivities:      -  Coagulase negative Staphylococcus: Detec      Method Type: PCR    Culture - Tissue with Gram Stain (collected 29 Dec 2022 13:56)  Source: .Tissue right foot 5th metatarsal  Final Report:    No growth at 5 days    Culture - Abscess with Gram Stain (collected 26 Dec 2022 22:03)  Source: .Abscess right foot  Final Report:    Rare Coag Negative Staphylococcus    Few Corynebacterium species    "Susceptibilities not performed"    Culture - Blood (collected 26 Dec 2022 19:55)  Source: .Blood Blood  Final Report:    No Growth Final    Cytomegalovirus By PCR:   NotDetec (01-23-23 @ 03:49)    COVID-19 PCR: NotDetec (01-24-23 @ 10:45)  COVID-19 PCR: NotDetec (12-28-22 @ 12:07)    Procalcitonin, Serum: 7.01 (01-23)    C-Reactive Protein, Serum: 338 (01-22)    Ferritin, Serum: 901 (01-25)    Sedimentation Rate, Erythrocyte: 108 mm/hr (01-24-23 @ 06:59)  Sedimentation Rate, Erythrocyte: 102 mm/hr (01-23-23 @ 03:49)  Sedimentation Rate, Erythrocyte: 120 mm/hr (01-22-23 @ 17:10)  Sedimentation Rate, Erythrocyte: 99 mm/hr (12-26-22 @ 20:08)  Sedimentation Rate, Erythrocyte: 90 mm/hr (11-03-22 @ 21:51)  Sedimentation Rate, Erythrocyte: 106 mm/hr (08-30-22 @ 04:09)  Sedimentation Rate, Erythrocyte: 35 mm/hr (06-19-22 @ 12:19)    RADIOLOGY:  imaging below personally reviewed                     Follow Up:      Interval History/ROS:Patient is a 67y old  Male who presents with a chief complaint of cellulitis vs ostemolyelitis.  ID following for R foot OM.  Seen at bedside, no new complaints, no diarrhea today.     REVIEW OF SYSTEMS  [  ] ROS unobtainable because:    [ x ] All other systems negative except as noted below    Constitutional:  [ ] fever [ ] chills  [ ] weight loss  [ ]night sweat  [X]poor appetite/PO intake [ ]fatigue   Skin:  [ ] rash [ ] phlebitis	  Eyes: [ ] icterus [ ] pain  [ ] discharge	  ENMT: [ ] sore throat  [ ] thrush [ ] ulcers [ ] exudates [ ]anosmia  Respiratory: [ ] dyspnea [ ] hemoptysis [ ] cough [ ] sputum	  Cardiovascular:  [ ] chest pain [ ] palpitations [ ] edema	  Gastrointestinal:  [ ] nausea [ ] vomiting [ ] diarrhea [ ] constipation [ ] pain	  Genitourinary:  [ ] dysuria [ ] frequency [ ] hematuria [ ] discharge [ ] flank pain  [ ] incontinence  Musculoskeletal:  [ ] myalgias [ ] arthralgias [ ] arthritis  [ ] back pain  Neurological:  [ ] headache [ ] weakness [ ] seizures  [ ] confusion/altered mental status    Allergies  codeine (Anaphylaxis)    ANTIMICROBIALS:    cefTRIAXone   IVPB 2000 every 24 hours  metroNIDAZOLE    Tablet 500 two times a day    OTHER MEDS: MEDICATIONS  (STANDING):  acetaminophen     Tablet .. 650 every 6 hours PRN  aluminum hydroxide/magnesium hydroxide/simethicone Suspension 30 every 4 hours PRN  dextrose 50% Injectable 25 once  dextrose 50% Injectable 12.5 once  dextrose 50% Injectable 25 once  dextrose Oral Gel 15 once PRN  glucagon  Injectable 1 once  heparin   Injectable 5000 every 8 hours  insulin glargine Injectable (LANTUS) 23 at bedtime  insulin lispro (ADMELOG) corrective regimen sliding scale  three times a day before meals  insulin lispro (ADMELOG) corrective regimen sliding scale  at bedtime  insulin lispro Injectable (ADMELOG) 6 three times a day before meals  melatonin 3 at bedtime PRN  mycophenolate mofetil 500 two times a day  ondansetron Injectable 4 every 8 hours PRN  pantoprazole    Tablet 40 before breakfast  predniSONE   Tablet 20 daily  psyllium Powder 1 daily  senna 2 at bedtime PRN    Vital Signs Last 24 Hrs  T(F): 97.9 (01-27-23 @ 09:04), Max: 102.3 (01-22-23 @ 21:05)    Vital Signs Last 24 Hrs  HR: 61 (01-27-23 @ 09:04) (61 - 66)  BP: 112/53 (01-27-23 @ 09:04) (112/53 - 128/57)  RR: 18 (01-27-23 @ 09:04)  SpO2: 96% (01-27-23 @ 09:04) (95% - 96%)  Wt(kg): --    EXAM:    GA: NAD  HEENT: oral cavity no lesion  CV: nl S1/S2, no RMG  Lungs: CTAB, no distress  Abd: BS+, soft, nontender, no rebounding pain  Ext:s/p  R TMA covered with dressing  Neuro: No focal deficits  Skin: Intact  IV: no phlebitis    Labs:                        8.6    8.78  )-----------( 467      ( 27 Jan 2023 08:34 )             27.8     01-27    138  |  107  |  44<H>  ----------------------------<  179<H>  4.0   |  23  |  1.99<H>    Ca    8.8      27 Jan 2023 08:34  Phos  3.2     01-27  Mg     1.9     01-27    TPro  6.1  /  Alb  2.6<L>  /  TBili  0.1<L>  /  DBili  x   /  AST  16  /  ALT  24  /  AlkPhos  134<H>  01-27    WBC Trend:  WBC Count: 8.78 (01-27-23 @ 08:34)  WBC Count: 8.27 (01-26-23 @ 10:22)  WBC Count: 7.25 (01-25-23 @ 07:17)  WBC Count: 7.42 (01-24-23 @ 06:59)    Creatine Trend:  Creatinine, Serum: 1.99 (01-27)  Creatinine, Serum: 2.03 (01-26)  Creatinine, Serum: 2.46 (01-25)  Creatinine, Serum: 2.32 (01-24)    Liver Biochemical Testing Trend:  Alanine Aminotransferase (ALT/SGPT): 24 (01-27)  Alanine Aminotransferase (ALT/SGPT): 27 (01-26)  Alanine Aminotransferase (ALT/SGPT): 32 (01-25)  Alanine Aminotransferase (ALT/SGPT): 31 (01-24)  Alanine Aminotransferase (ALT/SGPT): 31 (01-23)  Aspartate Aminotransferase (AST/SGOT): 16 (01-27-23 @ 08:34)  Aspartate Aminotransferase (AST/SGOT): 14 (01-26-23 @ 10:22)  Aspartate Aminotransferase (AST/SGOT): 25 (01-25-23 @ 07:17)  Aspartate Aminotransferase (AST/SGOT): 20 (01-24-23 @ 06:59)  Aspartate Aminotransferase (AST/SGOT): 22 (01-23-23 @ 12:45)  Bilirubin Total, Serum: 0.1 (01-27)  Bilirubin Total, Serum: 0.1 (01-26)  Bilirubin Total, Serum: 0.2 (01-25)  Bilirubin Total, Serum: 0.2 (01-24)  Bilirubin Total, Serum: 0.4 (01-23)    Trend LDH  12-26-21 @ 07:31  260<H>  12-20-21 @ 12:42  334<H>  06-13-20 @ 07:29  164  06-11-20 @ 11:33  281<H>  04-29-20 @ 07:11  199    MICROBIOLOGY:    MRSA PCR Result.: NotDetec (12-27-22 @ 10:10)  MRSA PCR Result.: NotDetec (08-30-22 @ 11:26)  MRSA PCR Result.: NotDetec (06-29-22 @ 06:24)    Culture - Tissue with Gram Stain (collected 25 Jan 2023 15:36)  Source: .Tissue Other  Preliminary Report:    No growth    Culture - Blood (collected 24 Jan 2023 07:09)  Source: .Blood Blood-Peripheral  Preliminary Report:    No growth to date.    Culture - Blood (collected 24 Jan 2023 07:09)  Source: .Blood Blood-Peripheral  Preliminary Report:    No growth to date.    Culture - Urine (collected 22 Jan 2023 21:11)  Source: Clean Catch Clean Catch (Midstream)  Final Report:    <10,000 CFU/mL Normal Urogenital Shantel    Culture - Abscess with Gram Stain (collected 22 Jan 2023 18:56)  Source: .Abscess Right foot  Final Report:    Few Klebsiella pneumoniae    Few Bacteroides thetaiotamcron group "Susceptibilities not performed"    Few Actinomyces odontolyticus "Susceptibilities not performed"    Few Streptococcus mitis/oralis group "Susceptibilities not performed"    Numerous Streptococcus agalactiae (Group B) isolated    Group B streptococci are susceptible to ampicillin,    penicillin and cefazolin, but may be resistant to    erythromycin and clindamycin.    Recommendations for intrapartum prophylaxis for Group B    streptococci are penicillin or ampicillin.  Organism: Klebsiella pneumoniae  Organism: Klebsiella pneumoniae    Sensitivities:      -  Amikacin: S <=16      -  Amoxicillin/Clavulanic Acid: I 16/8      -  Ampicillin: R >16 These ampicillin results predict results for amoxicillin      -  Ampicillin/Sulbactam: R >16/8 Enterobacter, Klebsiella aerogenes, Citrobacter, and Serratia may develop resistance during prolonged therapy (3-4 days)      -  Aztreonam: S <=4      -  Cefazolin: I 4 Enterobacter, Klebsiella aerogenes, Citrobacter, and Serratia may develop resistance during prolonged therapy (3-4 days)      -  Cefepime: S <=2      -  Cefoxitin: S <=8      -  Ceftriaxone: S <=1 Enterobacter, Klebsiella aerogenes, Citrobacter, and Serratia may develop resistance during prolonged therapy      -  Ciprofloxacin: S <=0.25      -  Ertapenem: S <=0.5      -  Gentamicin: S <=2      -  Imipenem: S <=1      -  Levofloxacin: S <=0.5      -  Meropenem: S <=1      -  Piperacillin/Tazobactam: R >64      -  Tobramycin: S <=2      -  Trimethoprim/Sulfamethoxazole: S <=0.5/9.5      Method Type: LEONARDO    Culture - Blood (collected 22 Jan 2023 17:10)  Source: .Blood Blood-Peripheral  Final Report:    **Please Note**: This is a Corrected Report**    Please disregard result, Bactec bottles mislabeled.    Previously reported as:    Growth in aerobic bottle: Gram positive cocci in pairs    ***Blood Panel PCR results on this specimen are available    approximately 3 hours after the Gram stain result.***    Gram stain, PCR, and/or culture results may not always    correspond due to difference in methodologies.    ************************************************************    This PCR assay was performed by multiplex PCR. This    Assay tests for 66 bacterial and resistance gene targets.    Please refer to the [facility] [employer] test directory    at [email or web address] for details.  Organism: Blood Culture PCR  Organism: Blood Culture PCR    Sensitivities:      -  Streptococcus agalactiae (Group B): Detec      Method Type: PCR    Culture - Blood (collected 22 Jan 2023 17:00)  Source: .Blood Blood-Peripheral  Final Report:    **Please Note**: This is a Corrected Report**    Please disregard report, Bactec bottles are mislabeled.    Previously reported as:    Growth in aerobic bottle: Gram Positive Cocci in Clusters    ***Blood Panel PCR results on this specimen are available    approximately 3 hours after the Gram stain result.***    Gram stain, PCR, and/or culture results may not always    correspond due to difference in methodologies.    ************************************************************    This PCR assay was performed bymultiplex PCR. This    Assay tests for 66 bacterial and resistance gene targets.    Please refer to the [facility] test directory    at [email or web address] for details.  Organism: Blood Culture PCR  Organism: Blood Culture PCR    Sensitivities:      -  Coagulase negative Staphylococcus: Detec      Method Type: PCR    Culture - Tissue with Gram Stain (collected 29 Dec 2022 13:56)  Source: .Tissue right foot 5th metatarsal  Final Report:    No growth at 5 days    Culture - Abscess with Gram Stain (collected 26 Dec 2022 22:03)  Source: .Abscess right foot  Final Report:    Rare Coag Negative Staphylococcus    Few Corynebacterium species    "Susceptibilities not performed"    Culture - Blood (collected 26 Dec 2022 19:55)  Source: .Blood Blood  Final Report:    No Growth Final    Cytomegalovirus By PCR:   NotDetec (01-23-23 @ 03:49)    COVID-19 PCR: NotDetec (01-24-23 @ 10:45)  COVID-19 PCR: NotDetec (12-28-22 @ 12:07)    Procalcitonin, Serum: 7.01 (01-23)    C-Reactive Protein, Serum: 338 (01-22)    Ferritin, Serum: 901 (01-25)    Sedimentation Rate, Erythrocyte: 108 mm/hr (01-24-23 @ 06:59)  Sedimentation Rate, Erythrocyte: 102 mm/hr (01-23-23 @ 03:49)  Sedimentation Rate, Erythrocyte: 120 mm/hr (01-22-23 @ 17:10)  Sedimentation Rate, Erythrocyte: 99 mm/hr (12-26-22 @ 20:08)  Sedimentation Rate, Erythrocyte: 90 mm/hr (11-03-22 @ 21:51)  Sedimentation Rate, Erythrocyte: 106 mm/hr (08-30-22 @ 04:09)  Sedimentation Rate, Erythrocyte: 35 mm/hr (06-19-22 @ 12:19)    RADIOLOGY:    <The imaging below has been reviewed and visualized by me independently. Findings as detailed in report below>    < from: Xray Foot AP + Lateral + Oblique, Right (01.25.23 @ 10:22) >  IMPRESSION:  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 Elliptical configuration surgical   skin staples over lateral aspect of the forefoot/midfoot related to graft   placement.    Soft tissue lucencies in distal Achilles region apparent on lateral view   probably correlate with concurrently performed LEAH procedure. Otherwise,   no proximally tracking gas collections beyond the amputation margins and   no focal areas of osteomyelitis.    Remainder of foot unchanged. Also correlate with intraoperative findings.    < end of copied text >

## 2023-01-27 NOTE — PROGRESS NOTE ADULT - PROBLEM SELECTOR PLAN 2
XR of foot c/f osteomyelitis  -Podiatry consulted in ED, recs appreciated  -Wound Cx pending  -broad spectrum coverage incl. anaerobes w/ vanc (dosed by level)/zosyn  -MRI R foot showed signs of osteomyelitis; pending TMA by podiatry with possible biospy on 1/25 XR of foot c/f osteomyelitis  -Podiatry consulted in ED, recs appreciated  -F/u surgical path; if negative -- will treat as SSTI; if positive will need line for OM  -broad spectrum coverage incl. anaerobes w/ vanc (dosed by level)/zosyn  -MRI R foot showed signs of osteomyelitis; s/pTMA by podiatry with possible biospy on 1/25

## 2023-01-27 NOTE — PROGRESS NOTE ADULT - ASSESSMENT
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 Found to be febrile and tachycardic on admission w/ MISA on CKD and metabolic acidosis on labs - possible osteomyelitic changes on imaging. Admitted to medicine for further management.  Patient s/p TMA on 1/25 pending bone biopsy results. Continuing on IV Abx

## 2023-01-27 NOTE — PROGRESS NOTE ADULT - ASSESSMENT
67 M s/p 1/25 R foot TMA w/ LEAH  - Pt seen and evaluated  - Sutures intact, graft intact , no hematoma, no seroma,  minimal drainage, no acute SOI  - Flaps warm and viable  - low concern for residual infection, high concern for viability   - Please start VAC today  - ID recommendations appreciated  - Pt is stable for d/c from a podiatry standpoint pending final ID recommendations.   - Seen w/ attending

## 2023-01-27 NOTE — PROGRESS NOTE ADULT - ASSESSMENT
MARIOJESSICA GOMSE is a 67y Male s/p OLT 7/2/2020 2/2 end stage liver disease due to JEAN  (on prednisone 20 mg,  BID, valcyte 450 daily  with CMV PCR  note detected 1/18/23  per Dr. 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 Transplant Hepatology consulted for post OLT management.    # End stage liver disease 2/2 JEAN s/p OLT 7/2020  # OM Rt foot c/b Klebsiella PNA s/p R ft transmetatarsal amputation with tendoachilles lengthening and 5th ray resection with posterior tibial tenotomy 1/25  # Bacteremia S Agalactiae, repeat BCx 1/24 negative  - GI PCR/CMV negative      Recommendations:  - On ceftriaxone/flagyl  - f/u Transplant ID recs, dc valcyte 450 q12h  - Continue  BID  - Continue prednisone 20 daily  - On PPI 40 daily  - Follow up BCx  - Daily CBC, CMP, coags  - C diff/GI Pcr if diarrhea persists  - Follow up ID recs  - Rest of care per primary    Preliminary note until signed by Attending.    Thank you for involving us in this patient's care.    Charlotte Alcocer MD  Gastroenterology/Hepatology Fellow, PGY-VI    NON-URGENT CONSULTS:  Please email [email or web address]  [email or web address]  AT NIGHT AND ON WEEKENDS:  Contact on-call GI fellow via answering service ([phone]) from 5pm-8am and on weekends/holidays  MONDAY-FRIDAY 8AM-5PM:  Pager# [phone] ([facility])  GI Phone# [phone] ([facility])

## 2023-01-27 NOTE — PROGRESS NOTE ADULT - PROBLEM SELECTOR PLAN 9
DVT PPx: Sub Q hep   Diet: consistent carb  Code status: Full code  Dispo: SANTANA  PCP: Dr. Dillan Coto  Pharmacy: [facility]

## 2023-01-27 NOTE — PROGRESS NOTE ADULT - PROBLEM SELECTOR PLAN 8
Pt wth JEAN cirrhosis, s/p OLT in 2020  -c/w prednisone, MMF, valganciclovir home medications  -valganciclovir discontinued because CMV negative

## 2023-01-27 NOTE — PROGRESS NOTE ADULT - SUBJECTIVE AND OBJECTIVE BOX
INTERVAL HPI/OVERNIGHT EVENTS:    SUBJECTIVE: Patient seen and examined at bedside.    OBJECTIVE:    VITAL SIGNS:  ICU Vital Signs Last 24 Hrs  T(C): 36.3 (26 Jan 2023 22:58), Max: 36.4 (26 Jan 2023 10:24)  T(F): 97.4 (26 Jan 2023 22:58), Max: 97.6 (26 Jan 2023 10:24)  HR: 66 (26 Jan 2023 22:58) (65 - 70)  BP: 128/57 (26 Jan 2023 22:58) (113/66 - 128/57)  BP(mean): --  ABP: --  ABP(mean): --  RR: 18 (26 Jan 2023 22:58) (18 - 18)  SpO2: 95% (26 Jan 2023 22:58) (95% - 96%)    O2 Parameters below as of 26 Jan 2023 22:58  Patient On (Oxygen Delivery Method): room air              01-26 @ 07:01  -  01-27 @ 07:00  --------------------------------------------------------  IN: 50 mL / OUT: 1060 mL / NET: -1010 mL      CAPILLARY BLOOD GLUCOSE      POCT Blood Glucose.: 170 mg/dL (27 Jan 2023 07:57)      PHYSICAL EXAM:    General: NAD  HEENT: NC/AT; PERRL, clear conjunctiva  Neck: supple  Respiratory: CTA b/l  Cardiovascular: +S1/S2; RRR  Abdomen: soft, NT/ND; +BS x4  Extremities:  no LE edema  Vascular: WWP, 2+ peripheral pulses b/l;  Skin: normal color and turgor; no rash  Neurological: A&Ox3, move all extremities. CN II-XII intact    MEDICATIONS:  MEDICATIONS  (STANDING):  cefTRIAXone   IVPB 2000 milliGRAM(s) IV Intermittent every 24 hours  dextrose 5%. 1000 milliLiter(s) (50 mL/Hr) IV Continuous <Continuous>  dextrose 5%. 1000 milliLiter(s) (100 mL/Hr) IV Continuous <Continuous>  dextrose 50% Injectable 12.5 Gram(s) IV Push once  dextrose 50% Injectable 25 Gram(s) IV Push once  dextrose 50% Injectable 25 Gram(s) IV Push once  folic acid 1 milliGRAM(s) Oral daily  glucagon  Injectable 1 milliGRAM(s) IntraMuscular once  heparin   Injectable 5000 Unit(s) SubCutaneous every 8 hours  insulin glargine Injectable (LANTUS) 18 Unit(s) SubCutaneous at bedtime  insulin lispro (ADMELOG) corrective regimen sliding scale   SubCutaneous three times a day before meals  insulin lispro (ADMELOG) corrective regimen sliding scale   SubCutaneous at bedtime  insulin lispro Injectable (ADMELOG) 4 Unit(s) SubCutaneous three times a day before meals  lactated ringers. 1000 milliLiter(s) (75 mL/Hr) IV Continuous <Continuous>  lactobacillus acidophilus 1 Tablet(s) Oral daily  lidocaine   4% Patch 1 Patch Transdermal daily  metroNIDAZOLE    Tablet 500 milliGRAM(s) Oral two times a day  mycophenolate mofetil 500 milliGRAM(s) Oral two times a day  pantoprazole    Tablet 40 milliGRAM(s) Oral before breakfast  predniSONE   Tablet 20 milliGRAM(s) Oral daily  psyllium Powder 1 Packet(s) Oral daily    MEDICATIONS  (PRN):  acetaminophen     Tablet .. 650 milliGRAM(s) Oral every 6 hours PRN Temp greater or equal to 38C (100.4F), Mild Pain (1 - 3)  aluminum hydroxide/magnesium hydroxide/simethicone Suspension 30 milliLiter(s) Oral every 4 hours PRN Dyspepsia  dextrose Oral Gel 15 Gram(s) Oral once PRN Blood Glucose LESS THAN 70 milliGRAM(s)/deciliter  melatonin 3 milliGRAM(s) Oral at bedtime PRN Insomnia  ondansetron Injectable 4 milliGRAM(s) IV Push every 8 hours PRN Nausea and/or Vomiting  senna 2 Tablet(s) Oral at bedtime PRN Constipation      ALLERGIES:  Allergies    codeine (Anaphylaxis)    Intolerances        LABS:                        8.4    8.27  )-----------( 412      ( 26 Jan 2023 10:22 )             26.4     01-26    133<L>  |  104  |  48<H>  ----------------------------<  281<H>  4.2   |  17<L>  |  2.03<H>    Ca    8.8      26 Jan 2023 10:22  Phos  3.1     01-26  Mg     1.9     01-26    TPro  6.4  /  Alb  2.7<L>  /  TBili  0.1<L>  /  DBili  x   /  AST  14  /  ALT  27  /  AlkPhos  149<H>  01-26          RADIOLOGY & ADDITIONAL TESTS: Reviewed. INTERVAL HPI/OVERNIGHT EVENTS: MARS OVN     SUBJECTIVE: Patient seen and examined at bedside. Denies any fevers, chills, diarrhea, RLE Pain.     OBJECTIVE:    VITAL SIGNS:  ICU Vital Signs Last 24 Hrs  T(C): 36.3 (26 Jan 2023 22:58), Max: 36.4 (26 Jan 2023 10:24)  T(F): 97.4 (26 Jan 2023 22:58), Max: 97.6 (26 Jan 2023 10:24)  HR: 66 (26 Jan 2023 22:58) (65 - 70)  BP: 128/57 (26 Jan 2023 22:58) (113/66 - 128/57)  BP(mean): --  ABP: --  ABP(mean): --  RR: 18 (26 Jan 2023 22:58) (18 - 18)  SpO2: 95% (26 Jan 2023 22:58) (95% - 96%)    O2 Parameters below as of 26 Jan 2023 22:58  Patient On (Oxygen Delivery Method): room air              01-26 @ 07:01  -  01-27 @ 07:00  --------------------------------------------------------  IN: 50 mL / OUT: 1060 mL / NET: -1010 mL      CAPILLARY BLOOD GLUCOSE      POCT Blood Glucose.: 170 mg/dL (27 Jan 2023 07:57)      PHYSICAL EXAM:  General: NAD  HEENT: NC/AT; PERRL, clear conjunctiva  Neck: supple  Respiratory: CTA b/l  Cardiovascular: +S1/S2; RRR  Abdomen: soft, NT/ND; +BS x4  Extremities:  no LE edema, right foot in cast; no sensation in bilateral feet   Vascular: WWP, 1+ peripheral pulses b/l;  Skin: normal color and turgor; no rash  Neurological: A&Ox3, move all extremities        MEDICATIONS:  MEDICATIONS  (STANDING):  cefTRIAXone   IVPB 2000 milliGRAM(s) IV Intermittent every 24 hours  dextrose 5%. 1000 milliLiter(s) (50 mL/Hr) IV Continuous <Continuous>  dextrose 5%. 1000 milliLiter(s) (100 mL/Hr) IV Continuous <Continuous>  dextrose 50% Injectable 12.5 Gram(s) IV Push once  dextrose 50% Injectable 25 Gram(s) IV Push once  dextrose 50% Injectable 25 Gram(s) IV Push once  folic acid 1 milliGRAM(s) Oral daily  glucagon  Injectable 1 milliGRAM(s) IntraMuscular once  heparin   Injectable 5000 Unit(s) SubCutaneous every 8 hours  insulin glargine Injectable (LANTUS) 18 Unit(s) SubCutaneous at bedtime  insulin lispro (ADMELOG) corrective regimen sliding scale   SubCutaneous three times a day before meals  insulin lispro (ADMELOG) corrective regimen sliding scale   SubCutaneous at bedtime  insulin lispro Injectable (ADMELOG) 4 Unit(s) SubCutaneous three times a day before meals  lactated ringers. 1000 milliLiter(s) (75 mL/Hr) IV Continuous <Continuous>  lactobacillus acidophilus 1 Tablet(s) Oral daily  lidocaine   4% Patch 1 Patch Transdermal daily  metroNIDAZOLE    Tablet 500 milliGRAM(s) Oral two times a day  mycophenolate mofetil 500 milliGRAM(s) Oral two times a day  pantoprazole    Tablet 40 milliGRAM(s) Oral before breakfast  predniSONE   Tablet 20 milliGRAM(s) Oral daily  psyllium Powder 1 Packet(s) Oral daily    MEDICATIONS  (PRN):  acetaminophen     Tablet .. 650 milliGRAM(s) Oral every 6 hours PRN Temp greater or equal to 38C (100.4F), Mild Pain (1 - 3)  aluminum hydroxide/magnesium hydroxide/simethicone Suspension 30 milliLiter(s) Oral every 4 hours PRN Dyspepsia  dextrose Oral Gel 15 Gram(s) Oral once PRN Blood Glucose LESS THAN 70 milliGRAM(s)/deciliter  melatonin 3 milliGRAM(s) Oral at bedtime PRN Insomnia  ondansetron Injectable 4 milliGRAM(s) IV Push every 8 hours PRN Nausea and/or Vomiting  senna 2 Tablet(s) Oral at bedtime PRN Constipation      ALLERGIES:  Allergies    codeine (Anaphylaxis)    Intolerances        LABS:                        8.4    8.27  )-----------( 412      ( 26 Jan 2023 10:22 )             26.4     01-26    133<L>  |  104  |  48<H>  ----------------------------<  281<H>  4.2   |  17<L>  |  2.03<H>    Ca    8.8      26 Jan 2023 10:22  Phos  3.1     01-26  Mg     1.9     01-26    TPro  6.4  /  Alb  2.7<L>  /  TBili  0.1<L>  /  DBili  x   /  AST  14  /  ALT  27  /  AlkPhos  149<H>  01-26          RADIOLOGY & ADDITIONAL TESTS: Reviewed.

## 2023-01-27 NOTE — PROGRESS NOTE ADULT - PROBLEM SELECTOR PLAN 1
Pt with fever of 102.3, tachycardia of 101, likely R pedal source, MISA on CKD and metabolic/lactic acidosis meet criteria for severe sepsis. Pt normotensive at this time. Of note, pt on immunosuppression of prednisone, MMF for hepatic transplant. Hx of VRE bacteremia, CMV viremia  -Blood culture unclear if patient's was positive per lab  -Pt received vanc/cefepime x1 in ED  -zosyn from 1/23-1/25; switched to ceftriaxone and metronidazole 1/25 -   -Resuscitation w/ IVF as needed (500cc bolus ordered)  -Transplant ID consulted; f/u recs  -trend ESR

## 2023-01-27 NOTE — PROGRESS NOTE ADULT - ASSESSMENT
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    Febrile on 1/23: T max 101.6F  No leukocytosis     -R Foot xray on 1/22:  mottled appearance of the stump margin of the fifth metatarsal concerning for osteomyelitis, surrounding soft tissue edema and air.   -Podiatry impression: 5th metatarsal wound site open to bone, fibrogranular, graft incorporating, retained staple noted, mild serous drainage, erythema to the level of the ankle, excess warmth noted to the global foot. R foot wound Cx sent by podiatry   -MRI R foot on 1/23: OM of 4th metatarsal and 4th proximal phalanx, signal abnormality at the distal margin of the remaining first proximal phalanx ?OM, possible OM in remaining distal 5th metatarsal, Large adjacent lateral ulceration with soft tissue infection. Joint effusion at the tarsometatarsal joints and at the navicular middle cuneiform articulation, ?joint infection.     Blood Cx on 1/22: CoNS (1/4 bottles)   Of note, there was no growth of GBS on blood cultures, this was an error from the microbiology lab  R foot Wound Cx on 1/22: Klebsiella pneumonia and Strep B   UA on 1/22: WBC >50, neg bacteria   Urine Cx on 1/22: NF    Antimicrobials:   Vanc 1/22  Cefepime 1/22  Zosyn (1/23-->)    #OM 4th metatarsal and proximal phalanx, ?OM remaining 5th and 1st proximal phalanx - s/p R TMA on 1/25 with low suspicion for residual infection   #Positive Culture Finding (Wound Culture) - polymicrobial   #Immunosuppressed, liver transplant recipient   #CMV Viremia -cleared  #Diarrhea    Recommendations:   -Continue Ceftriaxone and Flagyl pending final bone Cx  -clean margin bone Cx : few GPC in pairs, f/up final bone Cx   -if continued diarrhea would send C diff toxin assay  -Valcyte discontinued, CMV PCR neg X2   -Monitor T curve and WBC trend     Hind Eduin Arias MD, PGY4  ID fellow  [employer] Teams Preferred  After 5pm/weekends call [phone]   67 year old Male with PMHx of obesity, IDDM, HFpEF (EF 65% as of 12/2021), CKD, 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    Febrile on 1/23: T max 101.6F  No leukocytosis     -R Foot xray on 1/22:  mottled appearance of the stump margin of the fifth metatarsal concerning for osteomyelitis, surrounding soft tissue edema and air.   -Podiatry impression: 5th metatarsal wound site open to bone, fibrogranular, graft incorporating, retained staple noted, mild serous drainage, erythema to the level of the ankle, excess warmth noted to the global foot. R foot wound Cx sent by podiatry   -MRI R foot on 1/23: OM of 4th metatarsal and 4th proximal phalanx, signal abnormality at the distal margin of the remaining first proximal phalanx ?OM, possible OM in remaining distal 5th metatarsal, Large adjacent lateral ulceration with soft tissue infection. Joint effusion at the tarsometatarsal joints and at the navicular middle cuneiform articulation, ?joint infection.     Blood Cx on 1/22: CoNS (1/4 bottles)   Of note, there was no growth of GBS on blood cultures, this was an error from the microbiology lab  R foot Wound Cx on 1/22: Klebsiella pneumonia and Strep B   UA on 1/22: WBC >50, neg bacteria   Urine Cx on 1/22: NF    Antimicrobials:   Vanc 1/22  Cefepime 1/22  Zosyn (1/23-->)    #OM 4th metatarsal and proximal phalanx, ?OM remaining 5th and 1st proximal phalanx - s/p R TMA on 1/25 with low suspicion for residual infection   #Positive Culture Finding (Wound Culture) - polymicrobial   #Immunosuppressed, liver transplant recipient   #CMV Viremia -cleared  #Diarrhea    Recommendations:   -Continue Ceftriaxone and Flagyl pending final bone Cx  -clean margin bone Cx : few GPC in pairs, f/up final bone Cx (favor waiting 72-96H on clean bone cultures given positive gram stain)  -if continued diarrhea would send C diff toxin assay  -Valcyte discontinued, CMV PCR neg X2   -Monitor T curve and WBC trend     Hind Eduin Arias MD, PGY4  ID fellow  [employer] Teams Preferred  After 5pm/weekends call [phone]

## 2023-01-27 NOTE — PROGRESS NOTE ADULT - PROBLEM SELECTOR PLAN 6
Pt with 5 day history of diarrhea (>10x/day), previous elevated CMV though pt currently on acycolvir. No further episodes since 1/24 AM  GI PCR negative; unlikely C Diff per RN and patient description of stool   -likely cause of metabolic acidosis

## 2023-01-27 NOTE — PROGRESS NOTE ADULT - PROBLEM SELECTOR PLAN 5
Initial SW/CM Assessment/Plan of Care Note     Baseline Assessment  52 year old admitted 4/13/2021 as Outpatient in Bed with a diagnosis of elective  Right total knee arthroplasty.  Injection left knee.   Prior to admission patient was living with Spouse/significant other and residing at House.  Patient does not  have a Power of  for Healthcare.  Document is not activated.   Pt representative is Rosy Meier. [phone].   Patient’s Primary Care Provider is Chilango Bahena MD.      Medical History  Past Medical History:   Diagnosis Date   • Arthritis    • GERD (gastroesophageal reflux disease)    • Hypertension        Prior to Admission Status  Functional Status  Ambulation: Independent/Self  Bathing: Independent/Self  Dressing: Independent/Self  Toileting: Independent/Self  Meal Preparation: Independent/Self  Shopping: Independent/Self  Medication Preparation: Independent/Self  Transportation: Independent/Self    Agency/Support  Type of Services Prior to Hospitalization: None  Support Systems: Family members  Home Devices/Equipment: None  Mobility Assist Devices: None  Sensory Support Devices: None    Current Status  PT Ambulation Tips:    PT Transfer Tips:     OT Bathing Tips:    OT Dressing Tips:    OT Toileting Tips:    OT Feeding Tips:    SLP Swallow/Feeding Tips:    SLP Comm/Cog Tips:    Current Mental Status:    Stressors:      Insurance  Primary: [facility]  Secondary: N/A    Barriers to Discharge  Identified Barriers to Discharge/Transition Planning: Assessment/stabilization in progress    Progress Note  Per Chart review: pt presented with elective right TKA.   Discharge disposition: Home with family.   Discharge plan:   Pt resides in [facility] with family.     Pt has OP PT -4/17/2021 0800.  [facility] P: [phone]. F:[phone]  Attention: Intake  Please send face sheet/ Order/PT notes.  Need OP PT order to be placed.     Home care discussion: List/  choice offered.  Pt declined home care at this time.   Anticoagulation will be  mg .   Preferred Pain medication:  Percocet   Preferred Pharmacy: [address] [facility]  Pt verbalized  The CPM machine/ cooling unit are at home.   PT to provide walker.   Transportation upon discharge will be provided by pt Uncle.   CM will continue to follow and assist with discharge planning.     Plan  SW/CM - Recommendations for Discharge: OP therapy(Pt to provide OP PT agency)   PT - Recommendations for Discharge:      OT - Recommendations for Discharge:      SLP - Recommendations for Discharge:     Anticipate patient will need post-hospital services. Necessary services are available.  Anticipate patient can return to the environment from which patient entered the hospital.   Anticipate patient can provide self-care at discharge.    Refer to SW/CM Flowsheet for Goals and objective data.      Pt admitted with hyponatremia of 130 i/s/o increasing pain of RLE as well as MISA on CKD. Lower end of normal w/ correction given hyperglycemia of 342  -f/u AM BMP to trend Na  -can f/u with urine lytes should Na downtrend

## 2023-01-27 NOTE — PROGRESS NOTE ADULT - SUBJECTIVE AND OBJECTIVE BOX
Patient is a 67y old  Male who presents with a chief complaint of cellulitis vs ostemolyelitis (27 Jan 2023 08:26)       INTERVAL HPI/OVERNIGHT EVENTS:  Patient seen and evaluated at bedside.  Pt is resting comfortable in NAD. Denies N/V/F/C.    Allergies    codeine (Anaphylaxis)    Intolerances        Vital Signs Last 24 Hrs  T(C): 36.6 (27 Jan 2023 09:04), Max: 36.6 (27 Jan 2023 09:04)  T(F): 97.9 (27 Jan 2023 09:04), Max: 97.9 (27 Jan 2023 09:04)  HR: 61 (27 Jan 2023 09:04) (61 - 70)  BP: 112/53 (27 Jan 2023 09:04) (112/53 - 128/57)  BP(mean): --  RR: 18 (27 Jan 2023 09:04) (18 - 18)  SpO2: 96% (27 Jan 2023 09:04) (95% - 96%)    Parameters below as of 27 Jan 2023 09:04  Patient On (Oxygen Delivery Method): room air        LABS:                        8.6    8.78  )-----------( 467      ( 27 Jan 2023 08:34 )             27.8     01-27    138  |  107  |  44<H>  ----------------------------<  179<H>  4.0   |  23  |  1.99<H>    Ca    8.8      27 Jan 2023 08:34  Phos  3.2     01-27  Mg     1.9     01-27    TPro  6.1  /  Alb  2.6<L>  /  TBili  0.1<L>  /  DBili  x   /  AST  16  /  ALT  24  /  AlkPhos  134<H>  01-27        CAPILLARY BLOOD GLUCOSE      POCT Blood Glucose.: 170 mg/dL (27 Jan 2023 07:57)  POCT Blood Glucose.: 236 mg/dL (26 Jan 2023 21:50)  POCT Blood Glucose.: 276 mg/dL (26 Jan 2023 16:26)  POCT Blood Glucose.: 333 mg/dL (26 Jan 2023 12:22)      Lower Extremity Physical Exam:  Lower Extremity Physical Exam:  Vascular: DP/PT 1/4, B/L, CFT <3 seconds to R 3-5 toes and L 1-5, Temperature gradient warm to cool, B/L.   Neuro: Epicritic sensation diminished to the level of toes, B/L.  Musculoskeletal/Ortho: s/p 1/25 right foot TMA w/ LEAH  Skin: sutures intact, graft intact , no hematoma, no seroma,  minimal drainage, no acute SOI      RADIOLOGY & ADDITIONAL TESTS:   Patient is a 67y old  Male who presents with a chief complaint of cellulitis vs ostemolyelitis (27 Jan 2023 08:26)       INTERVAL HPI/OVERNIGHT EVENTS:  Patient seen and evaluated at bedside.  Pt is resting comfortable in NAD. Denies N/V/F/C.    Allergies    codeine (Anaphylaxis)    Intolerances        Vital Signs Last 24 Hrs  T(C): 36.6 (27 Jan 2023 09:04), Max: 36.6 (27 Jan 2023 09:04)  T(F): 97.9 (27 Jan 2023 09:04), Max: 97.9 (27 Jan 2023 09:04)  HR: 61 (27 Jan 2023 09:04) (61 - 70)  BP: 112/53 (27 Jan 2023 09:04) (112/53 - 128/57)  BP(mean): --  RR: 18 (27 Jan 2023 09:04) (18 - 18)  SpO2: 96% (27 Jan 2023 09:04) (95% - 96%)    Parameters below as of 27 Jan 2023 09:04  Patient On (Oxygen Delivery Method): room air        LABS:                        8.6    8.78  )-----------( 467      ( 27 Jan 2023 08:34 )             27.8     01-27    138  |  107  |  44<H>  ----------------------------<  179<H>  4.0   |  23  |  1.99<H>    Ca    8.8      27 Jan 2023 08:34  Phos  3.2     01-27  Mg     1.9     01-27    TPro  6.1  /  Alb  2.6<L>  /  TBili  0.1<L>  /  DBili  x   /  AST  16  /  ALT  24  /  AlkPhos  134<H>  01-27        CAPILLARY BLOOD GLUCOSE      POCT Blood Glucose.: 170 mg/dL (27 Jan 2023 07:57)  POCT Blood Glucose.: 236 mg/dL (26 Jan 2023 21:50)  POCT Blood Glucose.: 276 mg/dL (26 Jan 2023 16:26)  POCT Blood Glucose.: 333 mg/dL (26 Jan 2023 12:22)      Lower Extremity Physical Exam:  Lower Extremity Physical Exam:  Vascular: DP/PT 1/4, B/L, CFt inact, Temperature gradient warm to cool, B/L.   Neuro: Epicritic sensation diminished to the level of toes, B/L.  Musculoskeletal/Ortho: s/p 1/25 right foot TMA w/ LEAH  Skin: sutures intact, graft intact , no hematoma, no seroma,  minimal drainage, no acute SOI      RADIOLOGY & ADDITIONAL TESTS:

## 2023-01-27 NOTE — PROGRESS NOTE ADULT - SUBJECTIVE AND OBJECTIVE BOX
[facility]  Ramón Falk PA-C  [address] [place] [address]  [phone]      INTERVAL HPI/OVERNIGHT EVENTS:    patient s/e  events noted  no diarrhea     MEDICATIONS  (STANDING):  cefTRIAXone   IVPB 2000 milliGRAM(s) IV Intermittent every 24 hours  dextrose 5%. 1000 milliLiter(s) (50 mL/Hr) IV Continuous <Continuous>  dextrose 5%. 1000 milliLiter(s) (100 mL/Hr) IV Continuous <Continuous>  dextrose 50% Injectable 25 Gram(s) IV Push once  dextrose 50% Injectable 12.5 Gram(s) IV Push once  dextrose 50% Injectable 25 Gram(s) IV Push once  folic acid 1 milliGRAM(s) Oral daily  glucagon  Injectable 1 milliGRAM(s) IntraMuscular once  heparin   Injectable 5000 Unit(s) SubCutaneous every 8 hours  insulin glargine Injectable (LANTUS) 18 Unit(s) SubCutaneous at bedtime  insulin lispro (ADMELOG) corrective regimen sliding scale   SubCutaneous three times a day before meals  insulin lispro (ADMELOG) corrective regimen sliding scale   SubCutaneous at bedtime  insulin lispro Injectable (ADMELOG) 4 Unit(s) SubCutaneous three times a day before meals  lactated ringers. 1000 milliLiter(s) (75 mL/Hr) IV Continuous <Continuous>  lactobacillus acidophilus 1 Tablet(s) Oral daily  lidocaine   4% Patch 1 Patch Transdermal daily  metroNIDAZOLE    Tablet 500 milliGRAM(s) Oral two times a day  mycophenolate mofetil 500 milliGRAM(s) Oral two times a day  pantoprazole    Tablet 40 milliGRAM(s) Oral before breakfast  predniSONE   Tablet 20 milliGRAM(s) Oral daily  psyllium Powder 1 Packet(s) Oral daily    MEDICATIONS  (PRN):  acetaminophen     Tablet .. 650 milliGRAM(s) Oral every 6 hours PRN Temp greater or equal to 38C (100.4F), Mild Pain (1 - 3)  aluminum hydroxide/magnesium hydroxide/simethicone Suspension 30 milliLiter(s) Oral every 4 hours PRN Dyspepsia  dextrose Oral Gel 15 Gram(s) Oral once PRN Blood Glucose LESS THAN 70 milliGRAM(s)/deciliter  melatonin 3 milliGRAM(s) Oral at bedtime PRN Insomnia  ondansetron Injectable 4 milliGRAM(s) IV Push every 8 hours PRN Nausea and/or Vomiting  senna 2 Tablet(s) Oral at bedtime PRN Constipation      Allergies    codeine (Anaphylaxis)    Intolerances        ROS:   General:  No  fevers, chills, night sweats, fatigue,   Eyes:  Good vision, no reported pain  ENT:  No sore throat, pain, runny nose, dysphagia  CV:  No pain, palpitations, hypo/hypertension  Resp:  No dyspnea, cough, tachypnea, wheezing  GI:  No pain, No nausea, No vomiting, No diarrhea, No constipation, No weight loss, No fever, No pruritis, No rectal bleeding, No tarry stools, No dysphagia,  :  No pain, bleeding, incontinence, nocturia  Muscle:  No pain, weakness  Neuro:  No weakness, tingling, memory problems  Psych:  No fatigue, insomnia, mood problems, depression  Endocrine:  No polyuria, polydipsia, cold/heat intolerance  Heme:  No petechiae, ecchymosis, easy bruisability  Skin:  No rash, tattoos, scars, edema      PHYSICAL EXAM:   Vital Signs:  Vital Signs Last 24 Hrs  T(C): 36.4 (26 Jan 2023 10:24), Max: 37.2 (26 Jan 2023 00:39)  T(F): 97.6 (26 Jan 2023 10:24), Max: 98.9 (26 Jan 2023 00:39)  HR: 70 (26 Jan 2023 10:24) (62 - 70)  BP: 124/63 (26 Jan 2023 10:24) (106/62 - 124/63)  BP(mean): --  RR: 18 (26 Jan 2023 10:24) (18 - 18)  SpO2: 95% (26 Jan 2023 10:24) (95% - 96%)    Parameters below as of 26 Jan 2023 10:24  Patient On (Oxygen Delivery Method): room air      Daily     Daily     GENERAL:  Appears stated age,   HEENT:  NC/AT,    CHEST:  Full & symmetric excursion,   HEART:  Regular rhythm,  ABDOMEN:  Soft, non-tender, non-distended,  EXTEREMITIES:  no cyanosis  SKIN:  No rash  NEURO:  Alert,       LABS:                        8.4    8.27  )-----------( 412      ( 26 Jan 2023 10:22 )             26.4     01-26    133<L>  |  104  |  48<H>  ----------------------------<  281<H>  4.2   |  17<L>  |  2.03<H>    Ca    8.8      26 Jan 2023 10:22  Phos  3.1     01-26  Mg     1.9     01-26    TPro  6.4  /  Alb  2.7<L>  /  TBili  0.1<L>  /  DBili  x   /  AST  14  /  ALT  27  /  AlkPhos  149<H>  01-26    PT/INR - ( 25 Jan 2023 07:17 )   PT: 13.2 sec;   INR: 1.15 ratio         PTT - ( 25 Jan 2023 07:17 )  PTT:25.5 sec      RADIOLOGY & ADDITIONAL TESTS:

## 2023-01-27 NOTE — PROGRESS NOTE ADULT - ATTENDING COMMENTS
JESSICA MARTIN is a 67y Male s/p OLT 7/2/2020 2/2 end stage liver disease due to JEAN  (on prednisone 20 mg,  BID, valcyte 450 daily  with CMV PCR  note detected 1/18/23  per Dr. 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

## 2023-01-28 NOTE — PROGRESS NOTE ADULT - SUBJECTIVE AND OBJECTIVE BOX
[facility]  Ramón Falk PA-C  [address] [place] [address]  [phone]      INTERVAL HPI/OVERNIGHT EVENTS:    patient s/e  events noted  no diarrhea had formed BM today    MEDICATIONS  (STANDING):  cefTRIAXone   IVPB 2000 milliGRAM(s) IV Intermittent every 24 hours  dextrose 5%. 1000 milliLiter(s) (50 mL/Hr) IV Continuous <Continuous>  dextrose 5%. 1000 milliLiter(s) (100 mL/Hr) IV Continuous <Continuous>  dextrose 50% Injectable 25 Gram(s) IV Push once  dextrose 50% Injectable 12.5 Gram(s) IV Push once  dextrose 50% Injectable 25 Gram(s) IV Push once  folic acid 1 milliGRAM(s) Oral daily  glucagon  Injectable 1 milliGRAM(s) IntraMuscular once  heparin   Injectable 5000 Unit(s) SubCutaneous every 8 hours  insulin glargine Injectable (LANTUS) 18 Unit(s) SubCutaneous at bedtime  insulin lispro (ADMELOG) corrective regimen sliding scale   SubCutaneous three times a day before meals  insulin lispro (ADMELOG) corrective regimen sliding scale   SubCutaneous at bedtime  insulin lispro Injectable (ADMELOG) 4 Unit(s) SubCutaneous three times a day before meals  lactated ringers. 1000 milliLiter(s) (75 mL/Hr) IV Continuous <Continuous>  lactobacillus acidophilus 1 Tablet(s) Oral daily  lidocaine   4% Patch 1 Patch Transdermal daily  metroNIDAZOLE    Tablet 500 milliGRAM(s) Oral two times a day  mycophenolate mofetil 500 milliGRAM(s) Oral two times a day  pantoprazole    Tablet 40 milliGRAM(s) Oral before breakfast  predniSONE   Tablet 20 milliGRAM(s) Oral daily  psyllium Powder 1 Packet(s) Oral daily    MEDICATIONS  (PRN):  acetaminophen     Tablet .. 650 milliGRAM(s) Oral every 6 hours PRN Temp greater or equal to 38C (100.4F), Mild Pain (1 - 3)  aluminum hydroxide/magnesium hydroxide/simethicone Suspension 30 milliLiter(s) Oral every 4 hours PRN Dyspepsia  dextrose Oral Gel 15 Gram(s) Oral once PRN Blood Glucose LESS THAN 70 milliGRAM(s)/deciliter  melatonin 3 milliGRAM(s) Oral at bedtime PRN Insomnia  ondansetron Injectable 4 milliGRAM(s) IV Push every 8 hours PRN Nausea and/or Vomiting  senna 2 Tablet(s) Oral at bedtime PRN Constipation      Allergies    codeine (Anaphylaxis)    Intolerances        ROS:   General:  No  fevers, chills, night sweats, fatigue,   Eyes:  Good vision, no reported pain  ENT:  No sore throat, pain, runny nose, dysphagia  CV:  No pain, palpitations, hypo/hypertension  Resp:  No dyspnea, cough, tachypnea, wheezing  GI:  No pain, No nausea, No vomiting, No diarrhea, No constipation, No weight loss, No fever, No pruritis, No rectal bleeding, No tarry stools, No dysphagia,  :  No pain, bleeding, incontinence, nocturia  Muscle:  No pain, weakness  Neuro:  No weakness, tingling, memory problems  Psych:  No fatigue, insomnia, mood problems, depression  Endocrine:  No polyuria, polydipsia, cold/heat intolerance  Heme:  No petechiae, ecchymosis, easy bruisability  Skin:  No rash, tattoos, scars, edema      PHYSICAL EXAM:   Vital Signs Last 24 Hrs  T(C): 36.6 (28 Jan 2023 09:25), Max: 36.8 (27 Jan 2023 23:12)  T(F): 97.9 (28 Jan 2023 09:25), Max: 98.2 (27 Jan 2023 23:12)  HR: 70 (28 Jan 2023 09:25) (65 - 70)  BP: 130/77 (28 Jan 2023 09:25) (130/77 - 135/68)  BP(mean): --  RR: 18 (28 Jan 2023 09:25) (18 - 18)  SpO2: 97% (28 Jan 2023 09:25) (95% - 97%)    Parameters below as of 28 Jan 2023 09:25  Patient On (Oxygen Delivery Method): room air          Daily     Daily     GENERAL:  Appears stated age,   HEENT:  NC/AT,    CHEST:  Full & symmetric excursion,   HEART:  Regular rhythm,  ABDOMEN:  Soft, non-tender, non-distended,  EXTEREMITIES:  no cyanosis  SKIN:  No rash  NEURO:  Alert,       LABS:                                 8.4    9.66  )-----------( 481      ( 28 Jan 2023 09:18 )             27.0     01-28    136  |  106  |  38<H>  ----------------------------<  229<H>  4.0   |  18<L>  |  1.70<H>    Ca    8.5      28 Jan 2023 09:18  Phos  2.9     01-28  Mg     1.9     01-28    TPro  5.9<L>  /  Alb  2.4<L>  /  TBili  0.1<L>  /  DBili  x   /  AST  16  /  ALT  22  /  AlkPhos  125<H>  01-28        PT/INR - ( 25 Jan 2023 07:17 )   PT: 13.2 sec;   INR: 1.15 ratio         PTT - ( 25 Jan 2023 07:17 )  PTT:25.5 sec      RADIOLOGY & ADDITIONAL TESTS:

## 2023-01-28 NOTE — PROGRESS NOTE ADULT - PROBLEM SELECTOR PLAN 8
Pt wth EJAN cirrhosis, s/p OLT in 2020  -c/w prednisone, MMF, valganciclovir home medications  -valganciclovir discontinued because CMV negative

## 2023-01-28 NOTE — PROGRESS NOTE ADULT - PROBLEM SELECTOR PLAN 3
Pt with known CKD, b/l SCr 1.6-1.8 (1.89 on discharge 3 weeks prior to admission 12/30/2022), now with SCr of 2.32 c/f ATN i/s/o possible osteomyelitis  -Urine lytes c/w pre-renal; trial IVF  -F/u AM BMP  -monitor urine output Pt with known CKD, b/l SCr 1.6-1.8 (1.89 on discharge 3 weeks prior to admission 12/30/2022), now at baseline  -Urine lytes c/w pre-renal; trial IVF  -F/u AM BMP  -monitor urine output

## 2023-01-28 NOTE — PROGRESS NOTE ADULT - SUBJECTIVE AND OBJECTIVE BOX
INTERVAL HPI/OVERNIGHT EVENTS:    SUBJECTIVE: Patient seen and examined at bedside.    OBJECTIVE:    VITAL SIGNS:  ICU Vital Signs Last 24 Hrs  T(C): 36.8 (27 Jan 2023 23:12), Max: 36.8 (27 Jan 2023 23:12)  T(F): 98.2 (27 Jan 2023 23:12), Max: 98.2 (27 Jan 2023 23:12)  HR: 65 (27 Jan 2023 23:12) (61 - 69)  BP: 135/68 (27 Jan 2023 23:12) (112/53 - 135/68)  BP(mean): --  ABP: --  ABP(mean): --  RR: 18 (27 Jan 2023 23:12) (18 - 18)  SpO2: 96% (27 Jan 2023 23:12) (95% - 96%)    O2 Parameters below as of 27 Jan 2023 23:12  Patient On (Oxygen Delivery Method): room air              01-27 @ 07:01  -  01-28 @ 07:00  --------------------------------------------------------  IN: 650 mL / OUT: 1765 mL / NET: -1115 mL      CAPILLARY BLOOD GLUCOSE      POCT Blood Glucose.: 265 mg/dL (27 Jan 2023 21:10)      PHYSICAL EXAM:    General: NAD  HEENT: NC/AT; PERRL, clear conjunctiva  Neck: supple  Respiratory: CTA b/l  Cardiovascular: +S1/S2; RRR  Abdomen: soft, NT/ND; +BS x4  Extremities:  no LE edema  Vascular: WWP, 2+ peripheral pulses b/l;  Skin: normal color and turgor; no rash  Neurological: A&Ox3, move all extremities. CN II-XII intact    MEDICATIONS:  MEDICATIONS  (STANDING):  cefTRIAXone   IVPB 2000 milliGRAM(s) IV Intermittent every 24 hours  dextrose 5%. 1000 milliLiter(s) (100 mL/Hr) IV Continuous <Continuous>  dextrose 5%. 1000 milliLiter(s) (50 mL/Hr) IV Continuous <Continuous>  dextrose 50% Injectable 25 Gram(s) IV Push once  dextrose 50% Injectable 25 Gram(s) IV Push once  dextrose 50% Injectable 12.5 Gram(s) IV Push once  folic acid 1 milliGRAM(s) Oral daily  glucagon  Injectable 1 milliGRAM(s) IntraMuscular once  heparin   Injectable 5000 Unit(s) SubCutaneous every 8 hours  insulin glargine Injectable (LANTUS) 26 Unit(s) SubCutaneous at bedtime  insulin lispro (ADMELOG) corrective regimen sliding scale   SubCutaneous three times a day before meals  insulin lispro (ADMELOG) corrective regimen sliding scale   SubCutaneous at bedtime  insulin lispro Injectable (ADMELOG) 8 Unit(s) SubCutaneous three times a day before meals  lactated ringers. 1000 milliLiter(s) (75 mL/Hr) IV Continuous <Continuous>  lactobacillus acidophilus 1 Tablet(s) Oral daily  lidocaine   4% Patch 1 Patch Transdermal daily  metroNIDAZOLE    Tablet 500 milliGRAM(s) Oral two times a day  mycophenolate mofetil 500 milliGRAM(s) Oral two times a day  pantoprazole    Tablet 40 milliGRAM(s) Oral before breakfast  predniSONE   Tablet 20 milliGRAM(s) Oral daily  psyllium Powder 1 Packet(s) Oral daily    MEDICATIONS  (PRN):  acetaminophen     Tablet .. 650 milliGRAM(s) Oral every 6 hours PRN Temp greater or equal to 38C (100.4F), Mild Pain (1 - 3)  aluminum hydroxide/magnesium hydroxide/simethicone Suspension 30 milliLiter(s) Oral every 4 hours PRN Dyspepsia  dextrose Oral Gel 15 Gram(s) Oral once PRN Blood Glucose LESS THAN 70 milliGRAM(s)/deciliter  melatonin 3 milliGRAM(s) Oral at bedtime PRN Insomnia  ondansetron Injectable 4 milliGRAM(s) IV Push every 8 hours PRN Nausea and/or Vomiting  senna 2 Tablet(s) Oral at bedtime PRN Constipation      ALLERGIES:  Allergies    codeine (Anaphylaxis)    Intolerances        LABS:                        8.6    8.78  )-----------( 467      ( 27 Jan 2023 08:34 )             27.8     01-27    138  |  107  |  44<H>  ----------------------------<  179<H>  4.0   |  23  |  1.99<H>    Ca    8.8      27 Jan 2023 08:34  Phos  3.2     01-27  Mg     1.9     01-27    TPro  6.1  /  Alb  2.6<L>  /  TBili  0.1<L>  /  DBili  x   /  AST  16  /  ALT  24  /  AlkPhos  134<H>  01-27          RADIOLOGY & ADDITIONAL TESTS: Reviewed. INTERVAL HPI/OVERNIGHT EVENTS: MARS OVN.     SUBJECTIVE: Patient seen and examined at bedside. Patient denies fevers, chills, lower extremity pain at this time.     OBJECTIVE:    VITAL SIGNS:  ICU Vital Signs Last 24 Hrs  T(C): 36.8 (27 Jan 2023 23:12), Max: 36.8 (27 Jan 2023 23:12)  T(F): 98.2 (27 Jan 2023 23:12), Max: 98.2 (27 Jan 2023 23:12)  HR: 65 (27 Jan 2023 23:12) (61 - 69)  BP: 135/68 (27 Jan 2023 23:12) (112/53 - 135/68)  BP(mean): --  ABP: --  ABP(mean): --  RR: 18 (27 Jan 2023 23:12) (18 - 18)  SpO2: 96% (27 Jan 2023 23:12) (95% - 96%)    O2 Parameters below as of 27 Jan 2023 23:12  Patient On (Oxygen Delivery Method): room air              01-27 @ 07:01  -  01-28 @ 07:00  --------------------------------------------------------  IN: 650 mL / OUT: 1765 mL / NET: -1115 mL      CAPILLARY BLOOD GLUCOSE      POCT Blood Glucose.: 265 mg/dL (27 Jan 2023 21:10)      PHYSICAL EXAM:    General: NAD  HEENT: NC/AT; PERRL, clear conjunctiva  Neck: supple  Respiratory: CTA b/l  Cardiovascular: +S1/S2; RRR  Abdomen: soft, NT/ND; +BS x4  Extremities:  no LE edema; vac in place on right foot draining serosanguinous fluid   Vascular: WWP, 1+ peripheral pulses b/l;  Skin: normal color and turgor; no rash  Neurological: A&Ox3, move all extremities. CN II-XII intact. No sensation in bilateral feet.     MEDICATIONS:  MEDICATIONS  (STANDING):  cefTRIAXone   IVPB 2000 milliGRAM(s) IV Intermittent every 24 hours  dextrose 5%. 1000 milliLiter(s) (100 mL/Hr) IV Continuous <Continuous>  dextrose 5%. 1000 milliLiter(s) (50 mL/Hr) IV Continuous <Continuous>  dextrose 50% Injectable 25 Gram(s) IV Push once  dextrose 50% Injectable 25 Gram(s) IV Push once  dextrose 50% Injectable 12.5 Gram(s) IV Push once  folic acid 1 milliGRAM(s) Oral daily  glucagon  Injectable 1 milliGRAM(s) IntraMuscular once  heparin   Injectable 5000 Unit(s) SubCutaneous every 8 hours  insulin glargine Injectable (LANTUS) 26 Unit(s) SubCutaneous at bedtime  insulin lispro (ADMELOG) corrective regimen sliding scale   SubCutaneous three times a day before meals  insulin lispro (ADMELOG) corrective regimen sliding scale   SubCutaneous at bedtime  insulin lispro Injectable (ADMELOG) 8 Unit(s) SubCutaneous three times a day before meals  lactated ringers. 1000 milliLiter(s) (75 mL/Hr) IV Continuous <Continuous>  lactobacillus acidophilus 1 Tablet(s) Oral daily  lidocaine   4% Patch 1 Patch Transdermal daily  metroNIDAZOLE    Tablet 500 milliGRAM(s) Oral two times a day  mycophenolate mofetil 500 milliGRAM(s) Oral two times a day  pantoprazole    Tablet 40 milliGRAM(s) Oral before breakfast  predniSONE   Tablet 20 milliGRAM(s) Oral daily  psyllium Powder 1 Packet(s) Oral daily    MEDICATIONS  (PRN):  acetaminophen     Tablet .. 650 milliGRAM(s) Oral every 6 hours PRN Temp greater or equal to 38C (100.4F), Mild Pain (1 - 3)  aluminum hydroxide/magnesium hydroxide/simethicone Suspension 30 milliLiter(s) Oral every 4 hours PRN Dyspepsia  dextrose Oral Gel 15 Gram(s) Oral once PRN Blood Glucose LESS THAN 70 milliGRAM(s)/deciliter  melatonin 3 milliGRAM(s) Oral at bedtime PRN Insomnia  ondansetron Injectable 4 milliGRAM(s) IV Push every 8 hours PRN Nausea and/or Vomiting  senna 2 Tablet(s) Oral at bedtime PRN Constipation      ALLERGIES:  Allergies    codeine (Anaphylaxis)    Intolerances        LABS:                        8.6    8.78  )-----------( 467      ( 27 Jan 2023 08:34 )             27.8     01-27    138  |  107  |  44<H>  ----------------------------<  179<H>  4.0   |  23  |  1.99<H>    Ca    8.8      27 Jan 2023 08:34  Phos  3.2     01-27  Mg     1.9     01-27    TPro  6.1  /  Alb  2.6<L>  /  TBili  0.1<L>  /  DBili  x   /  AST  16  /  ALT  24  /  AlkPhos  134<H>  01-27          RADIOLOGY & ADDITIONAL TESTS: Reviewed.

## 2023-01-28 NOTE — PROGRESS NOTE ADULT - PROBLEM SELECTOR PLAN 6
Pt with 5 day history of diarrhea (>10x/day), previous elevated CMV though pt currently on acycolvir. No further episodes since 1/24 AM  GI PCR negative; unlikely C Diff per RN and patient description of stool   -likely cause of metabolic acidosis Pt with 5 day history of diarrhea (>10x/day), previous elevated CMV though pt currently on acycolvir. No further episodes since 1/24 AM  GI PCR negative; unlikely C Diff per RN and patient description of stool   -likely cause of metabolic acidosis  -Diarrhea now resolved

## 2023-01-28 NOTE — PROGRESS NOTE ADULT - PROBLEM SELECTOR PLAN 1
Pt with fever of 102.3, tachycardia of 101, likely R pedal source, MISA on CKD and metabolic/lactic acidosis meet criteria for severe sepsis. Pt normotensive at this time. Of note, pt on immunosuppression of prednisone, MMF for hepatic transplant. Hx of VRE bacteremia, CMV viremia  -Blood culture unclear if patient's was positive per lab  -Pt received vanc/cefepime x1 in ED  -zosyn from 1/23-1/25; switched to ceftriaxone and metronidazole 1/25 -   -Resuscitation w/ IVF as needed (500cc bolus ordered)  -Transplant ID consulted; f/u recs  -trend ESR Pt initially septic with fever of 102.3, tachycardia of 101, R pedal source, MISA on CKD and metabolic/lactic acidosis meet criteria for severe sepsis. Now resolved Pt normotensive at this time. Of note, pt on immunosuppression of prednisone, MMF for hepatic transplant. Hx of VRE bacteremia, CMV viremia  -Blood culture unclear if patient's was positive per lab  -Pt received vanc/cefepime x1 in ED  -zosyn from 1/23-1/25; switched to ceftriaxone and metronidazole 1/25 -   -Resuscitation w/ IVF as needed (500cc bolus ordered)  -Transplant ID consulted; f/u recs  -trend ESR

## 2023-01-28 NOTE — PROGRESS NOTE ADULT - PROBLEM SELECTOR PLAN 2
XR of foot c/f osteomyelitis  -Podiatry consulted in ED, recs appreciated  -F/u surgical path; if negative -- will treat as SSTI; if positive will need line for OM  -broad spectrum coverage incl. anaerobes w/ vanc (dosed by level)/zosyn  -MRI R foot showed signs of osteomyelitis; s/pTMA by podiatry with possible biospy on 1/25 XR of foot c/f osteomyelitis  -Podiatry consulted in ED, recs appreciated  -F/u final surgical path; if negative -- will treat as SSTI; if positive will need line for OM  -broad spectrum coverage incl. anaerobes w/ vanc (dosed by level)/zosyn  -MRI R foot showed signs of osteomyelitis; s/pTMA by podiatry with possible biospy on 1/25

## 2023-01-28 NOTE — PROGRESS NOTE ADULT - ASSESSMENT
63 y/o gentleman with IDDM, dyslipidemia, obesity, GERD, HFpEF with mild LV diastolic dysfunction, MGUS, hronic anemia with a history of duodenal ulcer as well as GAVE and duodenal AVM s/p APC (last on 10/11/19), 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     Re-admitted with worsening HE, UTI/VRE, MISA/Hyponatremia, UGI bleed.     [] VRE UTI  - urine cx from 6/14 grew VRE  - f/u urine cx from 6/21 with NG, bld cxs (6/21) NGTD  - received Linezolid 6/15-22, Now on Bactrim DS  - ID following    [] UGI bleed  - s/p EGD (6/22) c/w hemorrhagic duodenitis/gastritis, Grade 2EV  - transfused 2u PRBC, 1u Cryo, 1u Plt (yest)  - please order for DDAVP today x 1 dose  - cont Protonix 40mg IV bid    [] JEAN Cirrhosis  - Listed for OLT with MELD 35  - cleared by ID   - HE: cont rifaximin/lactulose  - Abd US duplex to assess portal vein: patent vessels   - Epogen 3xweek   - Daily CBC, CMP, INR  - cont SICU care      Please page [phone] with any questions 4 = No assist / stand by assistance

## 2023-01-28 NOTE — PROGRESS NOTE ADULT - PROBLEM SELECTOR PLAN 7
On lantus 14u nightly, admelog 9u TID  -lantus 18 units and lispro 4 u here   -adjust insulin per SS requirements  -reduced lantus to 10 u prior to OR and NPO On lantus 14u nightly, admelog 9u TID at home  -lantus 26units and lispro 8 u here   -adjust insulin per SS requirements

## 2023-01-28 NOTE — PROGRESS NOTE ADULT - ATTENDING COMMENTS
66 yo M PMH DM, liver tx on immunosuppression, JEAN cirrhosis s/p liver transplant 2020, recently admitted for R 5th toe infection s/p partial ray resection in Dec 2022, admitted for increase RLE pain admitted for sepsis 2/2 OM, now right foot TMA w/ TML 1/25. Diarrhea resolved. Pending final culture results, on flagyl and ctx. ID following. Dispo to [facility]    d/w HS4    Mahi Almanza MD  Division of Hospital Medicine  Available on [employer] Teams

## 2023-01-29 NOTE — PROGRESS NOTE ADULT - PROBLEM SELECTOR PLAN 2
XR of foot c/f osteomyelitis  -Podiatry consulted in ED, recs appreciated  -F/u final surgical path; if negative -- will treat as SSTI; if positive will need line for OM  -broad spectrum coverage incl. anaerobes w/ vanc (dosed by level)/zosyn  -MRI R foot showed signs of osteomyelitis; s/pTMA by podiatry with possible biospy on 1/25

## 2023-01-29 NOTE — PROGRESS NOTE ADULT - SUBJECTIVE AND OBJECTIVE BOX
PROGRESS NOTE:     Patient is a 67y old  Male who presents with a chief complaint of cellulitis vs osteomyelitis (28 Jan 2023 13:33)      SUBJECTIVE / OVERNIGHT EVENTS:    ADDITIONAL REVIEW OF SYSTEMS:    MEDICATIONS  (STANDING):  cefTRIAXone   IVPB 2000 milliGRAM(s) IV Intermittent every 24 hours  dextrose 5%. 1000 milliLiter(s) (50 mL/Hr) IV Continuous <Continuous>  dextrose 5%. 1000 milliLiter(s) (100 mL/Hr) IV Continuous <Continuous>  dextrose 50% Injectable 25 Gram(s) IV Push once  dextrose 50% Injectable 12.5 Gram(s) IV Push once  dextrose 50% Injectable 25 Gram(s) IV Push once  folic acid 1 milliGRAM(s) Oral daily  glucagon  Injectable 1 milliGRAM(s) IntraMuscular once  heparin   Injectable 5000 Unit(s) SubCutaneous every 8 hours  insulin glargine Injectable (LANTUS) 26 Unit(s) SubCutaneous at bedtime  insulin lispro (ADMELOG) corrective regimen sliding scale   SubCutaneous three times a day before meals  insulin lispro (ADMELOG) corrective regimen sliding scale   SubCutaneous at bedtime  insulin lispro Injectable (ADMELOG) 8 Unit(s) SubCutaneous three times a day before meals  lactated ringers. 1000 milliLiter(s) (75 mL/Hr) IV Continuous <Continuous>  lactobacillus acidophilus 1 Tablet(s) Oral daily  lidocaine   4% Patch 1 Patch Transdermal daily  metroNIDAZOLE    Tablet 500 milliGRAM(s) Oral two times a day  mycophenolate mofetil 500 milliGRAM(s) Oral two times a day  pantoprazole    Tablet 40 milliGRAM(s) Oral before breakfast  predniSONE   Tablet 20 milliGRAM(s) Oral daily  psyllium Powder 1 Packet(s) Oral daily    MEDICATIONS  (PRN):  acetaminophen     Tablet .. 650 milliGRAM(s) Oral every 6 hours PRN Temp greater or equal to 38C (100.4F), Mild Pain (1 - 3)  aluminum hydroxide/magnesium hydroxide/simethicone Suspension 30 milliLiter(s) Oral every 4 hours PRN Dyspepsia  dextrose Oral Gel 15 Gram(s) Oral once PRN Blood Glucose LESS THAN 70 milliGRAM(s)/deciliter  melatonin 3 milliGRAM(s) Oral at bedtime PRN Insomnia  ondansetron Injectable 4 milliGRAM(s) IV Push every 8 hours PRN Nausea and/or Vomiting  senna 2 Tablet(s) Oral at bedtime PRN Constipation      CAPILLARY BLOOD GLUCOSE      POCT Blood Glucose.: 212 mg/dL (29 Jan 2023 08:23)  POCT Blood Glucose.: 314 mg/dL (28 Jan 2023 21:27)  POCT Blood Glucose.: 346 mg/dL (28 Jan 2023 16:13)  POCT Blood Glucose.: 238 mg/dL (28 Jan 2023 12:41)    I&O's Summary    28 Jan 2023 07:01  -  29 Jan 2023 07:00  --------------------------------------------------------  IN: 240 mL / OUT: 500 mL / NET: -260 mL        PHYSICAL EXAM:  Vital Signs Last 24 Hrs  T(C): 36.7 (28 Jan 2023 23:21), Max: 36.9 (28 Jan 2023 16:00)  T(F): 98.1 (28 Jan 2023 23:21), Max: 98.5 (28 Jan 2023 16:00)  HR: 69 (28 Jan 2023 23:21) (69 - 70)  BP: 130/61 (28 Jan 2023 23:21) (130/61 - 135/70)  BP(mean): --  RR: 18 (28 Jan 2023 23:21) (18 - 18)  SpO2: 97% (28 Jan 2023 23:21) (97% - 97%)    Parameters below as of 28 Jan 2023 23:21  Patient On (Oxygen Delivery Method): room air        CONSTITUTIONAL: NAD, well-developed  RESPIRATORY: Normal respiratory effort; lungs are clear to auscultation bilaterally  CARDIOVASCULAR: Regular rate and rhythm, normal S1 and S2, no murmur/rub/gallop; No lower extremity edema; Peripheral pulses are 2+ bilaterally  ABDOMEN: Nontender to palpation, normoactive bowel sounds, no rebound/guarding  MUSCULOSKELETAL: no clubbing or cyanosis of digits; no joint swelling or tenderness to palpation  PSYCH: A+O to person, place, and time; affect appropriate    LABS:                        8.4    9.66  )-----------( 481      ( 28 Jan 2023 09:18 )             27.0     01-28    136  |  106  |  38<H>  ----------------------------<  229<H>  4.0   |  18<L>  |  1.70<H>    Ca    8.5      28 Jan 2023 09:18  Phos  2.9     01-28  Mg     1.9     01-28    TPro  5.9<L>  /  Alb  2.4<L>  /  TBili  0.1<L>  /  DBili  x   /  AST  16  /  ALT  22  /  AlkPhos  125<H>  01-28                RADIOLOGY & ADDITIONAL TESTS:  Results Reviewed:   Imaging Personally Reviewed:  Electrocardiogram Personally Reviewed:    COORDINATION OF CARE:  Care Discussed with Consultants/Other Providers [Y/N]:  Prior or Outpatient Records Reviewed [Y/N]:   PROGRESS NOTE:     Patient is a 67y old  Male who presents with a chief complaint of cellulitis vs osteomyelitis (28 Jan 2023 13:33)      SUBJECTIVE / OVERNIGHT EVENTS: No acute events overnight. This AM patient seen and examined at bedside. Patient feels well with no complaints at this time.     ADDITIONAL REVIEW OF SYSTEMS: Negative     MEDICATIONS  (STANDING):  cefTRIAXone   IVPB 2000 milliGRAM(s) IV Intermittent every 24 hours  dextrose 5%. 1000 milliLiter(s) (50 mL/Hr) IV Continuous <Continuous>  dextrose 5%. 1000 milliLiter(s) (100 mL/Hr) IV Continuous <Continuous>  dextrose 50% Injectable 25 Gram(s) IV Push once  dextrose 50% Injectable 12.5 Gram(s) IV Push once  dextrose 50% Injectable 25 Gram(s) IV Push once  folic acid 1 milliGRAM(s) Oral daily  glucagon  Injectable 1 milliGRAM(s) IntraMuscular once  heparin   Injectable 5000 Unit(s) SubCutaneous every 8 hours  insulin glargine Injectable (LANTUS) 26 Unit(s) SubCutaneous at bedtime  insulin lispro (ADMELOG) corrective regimen sliding scale   SubCutaneous three times a day before meals  insulin lispro (ADMELOG) corrective regimen sliding scale   SubCutaneous at bedtime  insulin lispro Injectable (ADMELOG) 8 Unit(s) SubCutaneous three times a day before meals  lactated ringers. 1000 milliLiter(s) (75 mL/Hr) IV Continuous <Continuous>  lactobacillus acidophilus 1 Tablet(s) Oral daily  lidocaine   4% Patch 1 Patch Transdermal daily  metroNIDAZOLE    Tablet 500 milliGRAM(s) Oral two times a day  mycophenolate mofetil 500 milliGRAM(s) Oral two times a day  pantoprazole    Tablet 40 milliGRAM(s) Oral before breakfast  predniSONE   Tablet 20 milliGRAM(s) Oral daily  psyllium Powder 1 Packet(s) Oral daily    MEDICATIONS  (PRN):  acetaminophen     Tablet .. 650 milliGRAM(s) Oral every 6 hours PRN Temp greater or equal to 38C (100.4F), Mild Pain (1 - 3)  aluminum hydroxide/magnesium hydroxide/simethicone Suspension 30 milliLiter(s) Oral every 4 hours PRN Dyspepsia  dextrose Oral Gel 15 Gram(s) Oral once PRN Blood Glucose LESS THAN 70 milliGRAM(s)/deciliter  melatonin 3 milliGRAM(s) Oral at bedtime PRN Insomnia  ondansetron Injectable 4 milliGRAM(s) IV Push every 8 hours PRN Nausea and/or Vomiting  senna 2 Tablet(s) Oral at bedtime PRN Constipation      CAPILLARY BLOOD GLUCOSE      POCT Blood Glucose.: 212 mg/dL (29 Jan 2023 08:23)  POCT Blood Glucose.: 314 mg/dL (28 Jan 2023 21:27)  POCT Blood Glucose.: 346 mg/dL (28 Jan 2023 16:13)  POCT Blood Glucose.: 238 mg/dL (28 Jan 2023 12:41)    I&O's Summary    28 Jan 2023 07:01  -  29 Jan 2023 07:00  --------------------------------------------------------  IN: 240 mL / OUT: 500 mL / NET: -260 mL        PHYSICAL EXAM:  Vital Signs Last 24 Hrs  T(C): 36.7 (28 Jan 2023 23:21), Max: 36.9 (28 Jan 2023 16:00)  T(F): 98.1 (28 Jan 2023 23:21), Max: 98.5 (28 Jan 2023 16:00)  HR: 69 (28 Jan 2023 23:21) (69 - 70)  BP: 130/61 (28 Jan 2023 23:21) (130/61 - 135/70)  BP(mean): --  RR: 18 (28 Jan 2023 23:21) (18 - 18)  SpO2: 97% (28 Jan 2023 23:21) (97% - 97%)    Parameters below as of 28 Jan 2023 23:21  Patient On (Oxygen Delivery Method): room air      General: NAD  HEENT: NC/AT; PERRL, clear conjunctiva  Neck: supple  Respiratory: CTA b/l  Cardiovascular: +S1/S2; RRR  Abdomen: soft, NT/ND; +BS x4  Extremities:  no LE edema; vac in place on right foot draining serosanguinous fluid   Vascular: WWP, 1+ peripheral pulses b/l;  Skin: normal color and turgor; no rash  Neurological: A&Ox3, move all extremities. CN II-XII intact. No sensation in bilateral feet.       LABS:                        8.4    9.66  )-----------( 481      ( 28 Jan 2023 09:18 )             27.0     01-28    136  |  106  |  38<H>  ----------------------------<  229<H>  4.0   |  18<L>  |  1.70<H>    Ca    8.5      28 Jan 2023 09:18  Phos  2.9     01-28  Mg     1.9     01-28    TPro  5.9<L>  /  Alb  2.4<L>  /  TBili  0.1<L>  /  DBili  x   /  AST  16  /  ALT  22  /  AlkPhos  125<H>  01-28                RADIOLOGY & ADDITIONAL TESTS:  Results Reviewed:   Imaging Personally Reviewed:  Electrocardiogram Personally Reviewed:    COORDINATION OF CARE:  Care Discussed with Consultants/Other Providers [Y/N]:  Prior or Outpatient Records Reviewed [Y/N]:

## 2023-01-29 NOTE — PROGRESS NOTE ADULT - ATTENDING COMMENTS
68 yo M PMH DM, liver tx on immunosuppression, JEAN cirrhosis s/p liver transplant 2020, recently admitted for R 5th toe infection s/p partial ray resection in Dec 2022, admitted for increase RLE pain admitted for sepsis 2/2 OM, now right foot TMA w/ TML 1/25. Diarrhea resolved. Pending final culture results, on flagyl and ctx. ID following. Dispo to [facility]    d/w HS4    Mahi Almanza MD  Division of Hospital Medicine  Available on [employer] Teams

## 2023-01-29 NOTE — PROGRESS NOTE ADULT - PROBLEM SELECTOR PLAN 3
Pt with known CKD, b/l SCr 1.6-1.8 (1.89 on discharge 3 weeks prior to admission 12/30/2022), now at baseline  -Urine lytes c/w pre-renal; trial IVF  -F/u AM BMP  -monitor urine output

## 2023-01-29 NOTE — PROGRESS NOTE ADULT - PROBLEM SELECTOR PLAN 6
Pt with 5 day history of diarrhea (>10x/day), previous elevated CMV though pt currently on acycolvir. No further episodes since 1/24 AM  GI PCR negative; unlikely C Diff per RN and patient description of stool   -likely cause of metabolic acidosis  -Diarrhea now resolved

## 2023-01-29 NOTE — PROGRESS NOTE ADULT - SUBJECTIVE AND OBJECTIVE BOX
[facility]  Ramón Falk PA-C  [address] [place] [address]  [phone]      INTERVAL HPI/OVERNIGHT EVENTS:    patient s/e  events noted  no diarrhea had formed BM yesterday    MEDICATIONS  (STANDING):  cefTRIAXone   IVPB 2000 milliGRAM(s) IV Intermittent every 24 hours  dextrose 5%. 1000 milliLiter(s) (50 mL/Hr) IV Continuous <Continuous>  dextrose 5%. 1000 milliLiter(s) (100 mL/Hr) IV Continuous <Continuous>  dextrose 50% Injectable 25 Gram(s) IV Push once  dextrose 50% Injectable 12.5 Gram(s) IV Push once  dextrose 50% Injectable 25 Gram(s) IV Push once  folic acid 1 milliGRAM(s) Oral daily  glucagon  Injectable 1 milliGRAM(s) IntraMuscular once  heparin   Injectable 5000 Unit(s) SubCutaneous every 8 hours  insulin glargine Injectable (LANTUS) 18 Unit(s) SubCutaneous at bedtime  insulin lispro (ADMELOG) corrective regimen sliding scale   SubCutaneous three times a day before meals  insulin lispro (ADMELOG) corrective regimen sliding scale   SubCutaneous at bedtime  insulin lispro Injectable (ADMELOG) 4 Unit(s) SubCutaneous three times a day before meals  lactated ringers. 1000 milliLiter(s) (75 mL/Hr) IV Continuous <Continuous>  lactobacillus acidophilus 1 Tablet(s) Oral daily  lidocaine   4% Patch 1 Patch Transdermal daily  metroNIDAZOLE    Tablet 500 milliGRAM(s) Oral two times a day  mycophenolate mofetil 500 milliGRAM(s) Oral two times a day  pantoprazole    Tablet 40 milliGRAM(s) Oral before breakfast  predniSONE   Tablet 20 milliGRAM(s) Oral daily  psyllium Powder 1 Packet(s) Oral daily    MEDICATIONS  (PRN):  acetaminophen     Tablet .. 650 milliGRAM(s) Oral every 6 hours PRN Temp greater or equal to 38C (100.4F), Mild Pain (1 - 3)  aluminum hydroxide/magnesium hydroxide/simethicone Suspension 30 milliLiter(s) Oral every 4 hours PRN Dyspepsia  dextrose Oral Gel 15 Gram(s) Oral once PRN Blood Glucose LESS THAN 70 milliGRAM(s)/deciliter  melatonin 3 milliGRAM(s) Oral at bedtime PRN Insomnia  ondansetron Injectable 4 milliGRAM(s) IV Push every 8 hours PRN Nausea and/or Vomiting  senna 2 Tablet(s) Oral at bedtime PRN Constipation      Allergies    codeine (Anaphylaxis)    Intolerances        ROS:   General:  No  fevers, chills, night sweats, fatigue,   Eyes:  Good vision, no reported pain  ENT:  No sore throat, pain, runny nose, dysphagia  CV:  No pain, palpitations, hypo/hypertension  Resp:  No dyspnea, cough, tachypnea, wheezing  GI:  No pain, No nausea, No vomiting, No diarrhea, No constipation, No weight loss, No fever, No pruritis, No rectal bleeding, No tarry stools, No dysphagia,  :  No pain, bleeding, incontinence, nocturia  Muscle:  No pain, weakness  Neuro:  No weakness, tingling, memory problems  Psych:  No fatigue, insomnia, mood problems, depression  Endocrine:  No polyuria, polydipsia, cold/heat intolerance  Heme:  No petechiae, ecchymosis, easy bruisability  Skin:  No rash, tattoos, scars, edema      PHYSICAL EXAM:   Vital Signs Last 24 Hrs  T(C): 36.4 (29 Jan 2023 13:37), Max: 36.9 (28 Jan 2023 16:00)  T(F): 97.6 (29 Jan 2023 13:37), Max: 98.5 (28 Jan 2023 16:00)  HR: 68 (29 Jan 2023 13:37) (68 - 69)  BP: 126/61 (29 Jan 2023 13:37) (126/61 - 135/70)  BP(mean): --  RR: 18 (29 Jan 2023 13:37) (18 - 18)  SpO2: 98% (29 Jan 2023 13:37) (97% - 98%)    Parameters below as of 29 Jan 2023 13:37  Patient On (Oxygen Delivery Method): room air      Daily     Daily     GENERAL:  Appears stated age,   HEENT:  NC/AT,    CHEST:  Full & symmetric excursion,   HEART:  Regular rhythm,  ABDOMEN:  Soft, non-tender, non-distended,  EXTEREMITIES:  no cyanosis  SKIN:  No rash  NEURO:  Alert,       LABS:                                          8.4    9.66  )-----------( 481      ( 28 Jan 2023 09:18 )             27.0                8.4    9.66  )-----------( 481      ( 28 Jan 2023 09:18 )             27.0   01-29    136  |  105  |  36<H>  ----------------------------<  234<H>  4.1   |  19<L>  |  1.54<H>    Ca    8.9      29 Jan 2023 09:44  Phos  3.8     01-29  Mg     1.9     01-29    TPro  6.2  /  Alb  2.6<L>  /  TBili  0.1<L>  /  DBili  x   /  AST  13  /  ALT  20  /  AlkPhos  126<H>  01-29            PT/INR - ( 25 Jan 2023 07:17 )   PT: 13.2 sec;   INR: 1.15 ratio         PTT - ( 25 Jan 2023 07:17 )  PTT:25.5 sec      RADIOLOGY & ADDITIONAL TESTS:

## 2023-01-29 NOTE — PROVIDER CONTACT NOTE (OTHER) - SITUATION
pt blood sugar 432. Pt gets nighttime sliding scale and lantus
PT A&Ox3 RA came in through the ED with cellulitis to R foot, s/p right 5th digit amputation. MD was informed during the day about positive blood cultures that were taken in the ER. PT is on zosyn
pt with peripheral Iv from 1/23. pt refused routine IV change

## 2023-01-29 NOTE — PROGRESS NOTE ADULT - PROBLEM SELECTOR PLAN 7
On lantus 14u nightly, admelog 9u TID at home  -lantus 26units and lispro 8 u here   -adjust insulin per SS requirements

## 2023-01-29 NOTE — PROGRESS NOTE ADULT - PROBLEM SELECTOR PLAN 1
Pt initially septic with fever of 102.3, tachycardia of 101, R pedal source, MISA on CKD and metabolic/lactic acidosis meet criteria for severe sepsis. Now resolved Pt normotensive at this time. Of note, pt on immunosuppression of prednisone, MMF for hepatic transplant. Hx of VRE bacteremia, CMV viremia  -Blood culture unclear if patient's was positive per lab  -Pt received vanc/cefepime x1 in ED  -zosyn from 1/23-1/25; switched to ceftriaxone and metronidazole 1/25 -   -Resuscitation w/ IVF as needed (500cc bolus ordered)  -Transplant ID consulted; f/u recs  -trend ESR

## 2023-01-30 NOTE — PROGRESS NOTE ADULT - PROBLEM SELECTOR PLAN 4
Initial VBG showed pH of 7.24, lactate 2.9, pCO2 41, HCO3 19. Repeat 5 hours later showed normalization of pH to 7.33, pCO2 of 36, HCO3 of 19. Possibly due to specimen delivery delay vs multifactorial cause given GI losses, MISA on CKD, infection  -Improving; ctm on daily bmp  -will assess for need for bicarb supplementation Pt wth JEAN cirrhosis, s/p OLT in 2020  - c/w prednisone, MMF home medications

## 2023-01-30 NOTE — PROGRESS NOTE ADULT - PROBLEM SELECTOR PLAN 1
Pt initially septic with fever of 102.3, tachycardia of 101, R pedal source, MISA on CKD and metabolic/lactic acidosis meet criteria for severe sepsis. Now resolved Pt normotensive at this time. Of note, pt on immunosuppression of prednisone, MMF for hepatic transplant. Hx of VRE bacteremia, CMV viremia  -Blood culture unclear if patient's was positive per lab  -Pt received vanc/cefepime x1 in ED  -zosyn from 1/23-1/25; switched to ceftriaxone and metronidazole 1/25 -   -Resuscitation w/ IVF as needed (500cc bolus ordered)  -Transplant ID consulted; f/u recs  -trend ESR XR of foot c/f osteomyelitis  - s/p TMA 1/25 with no orgs visualized   - s/p Zosyn --> ceftriaxone and Flagyl  - per ID, can discharge on cefpodoxime and Flagyl BID until 2/1

## 2023-01-30 NOTE — PROGRESS NOTE ADULT - ATTENDING SUPERVISION STATEMENT
Fellow
Resident

## 2023-01-30 NOTE — PROGRESS NOTE ADULT - PROBLEM SELECTOR PLAN 5
Pt admitted with hyponatremia of 130 i/s/o increasing pain of RLE as well as MISA on CKD. Lower end of normal w/ correction given hyperglycemia of 342  -f/u AM BMP to trend Na  -can f/u with urine lytes should Na downtrend DVT PPx: Sub Q hep   Diet: consistent carb  Activity: NWB RLE  Code status: Full code  Dispo: [facility]

## 2023-01-30 NOTE — PROGRESS NOTE ADULT - SUBJECTIVE AND OBJECTIVE BOX
Patient is a 67y old  Male who presents with a chief complaint of cellulitis vs ostemolyelitis (29 Jan 2023 13:51)       INTERVAL HPI/OVERNIGHT EVENTS:  Patient seen and evaluated at bedside.  Pt is resting comfortable in NAD. Denies N/V/F/C.  Pain rated at X/10    Allergies    codeine (Anaphylaxis)    Intolerances        Vital Signs Last 24 Hrs  T(C): 36.8 (29 Jan 2023 23:08), Max: 36.8 (29 Jan 2023 23:08)  T(F): 98.3 (29 Jan 2023 23:08), Max: 98.3 (29 Jan 2023 23:08)  HR: 72 (29 Jan 2023 23:08) (68 - 72)  BP: 130/60 (29 Jan 2023 23:08) (126/61 - 138/74)  BP(mean): --  RR: 18 (29 Jan 2023 23:08) (18 - 18)  SpO2: 93% (29 Jan 2023 23:08) (93% - 98%)    Parameters below as of 29 Jan 2023 23:08  Patient On (Oxygen Delivery Method): room air        LABS:                        8.4    9.66  )-----------( 481      ( 28 Jan 2023 09:18 )             27.0     01-29    136  |  105  |  36<H>  ----------------------------<  234<H>  4.1   |  19<L>  |  1.54<H>    Ca    8.9      29 Jan 2023 09:44  Phos  3.8     01-29  Mg     1.9     01-29    TPro  6.2  /  Alb  2.6<L>  /  TBili  0.1<L>  /  DBili  x   /  AST  13  /  ALT  20  /  AlkPhos  126<H>  01-29        CAPILLARY BLOOD GLUCOSE      POCT Blood Glucose.: 248 mg/dL (30 Jan 2023 08:08)  POCT Blood Glucose.: 277 mg/dL (29 Jan 2023 21:29)  POCT Blood Glucose.: 244 mg/dL (29 Jan 2023 16:26)  POCT Blood Glucose.: 195 mg/dL (29 Jan 2023 11:55)      Lower Extremity Physical Exam:  Vascular: DP/PT 1/4, B/L, CFT inact, Temperature gradient warm to cool, B/L.   Neuro: Epicritic sensation diminished to the level of toes, B/L.  Musculoskeletal/Ortho: s/p 1/25 right foot TMA w/ LEAH  Skin: sutures intact, graft intact , no hematoma, no seroma,  minimal drainage, no acute SOI    RADIOLOGY & ADDITIONAL TESTS:

## 2023-01-30 NOTE — DISCHARGE NOTE NURSING/CASE MANAGEMENT/SOCIAL WORK - NSDCFUADDAPPT_GEN_ALL_CORE_FT
Podiatry Discharge Instructions:  - Follow up: Please follow up with Dr. Wang within 1 week of discharge from the hospital, please call [phone] for appointment and discuss that you recently were seen in the hospital.  - Wound Care: Please apply Blackfoam VAC at 75 mmHg 3 times a week continuously.   - Weight bearing: Please non-weight bearing in a CAM boots to right lower extremity .   - Antibiotics: Please continue as instructed.    ========================

## 2023-01-30 NOTE — DISCHARGE NOTE NURSING/CASE MANAGEMENT/SOCIAL WORK - NSDCVIVACCINE_GEN_ALL_CORE_FT
influenza, injectable, quadrivalent, preservative free; 17-Dec-2019 10:15; Rajinder Rivera (RN); [employer]; [id number] (Exp. Date: 30-Jun-2020); IntraMuscular; Deltoid Right.; 0.5 milliLiter(s); VIS (VIS Published: 15-Aug-2019, VIS Presented: 17-Dec-2019);   Pneumococcal conjugate PCV 13; 16-Dec-2019 18:28; Jodie Serrano (RN); [facility]; [id number] (Exp. Date: 01-Jun-2021); IntraMuscular; Deltoid Right.; 0.5 milliLiter(s); VIS (VIS Published: 05-Nov-2015, VIS Presented: 16-Dec-2019);   pneumococcal polysaccharide PPV23; 18-Feb-2020 13:31; Carroll Guardado (RN); [employer] [id number] (Exp. Date: 18-May-2021); IntraMuscular; Deltoid Right.; 0.5 milliLiter(s); VIS (VIS Published: 06-Oct-2009, VIS Presented: 18-Feb-2020);

## 2023-01-30 NOTE — PROGRESS NOTE ADULT - ASSESSMENT
67 M s/p 1/25 R foot TMA w/ LEAH  - Pt seen and evaluated  - Afebrile, no leukocytosis   - sutures intact, graft intact , no hematoma, no seroma,  minimal drainage, no acute SOI  - Flaps warm and viable  - low concern for residual infection, high concern for viability   - VAC to be re-applied today  - ID recommendations appreciated  - Pt is stable for d/c from a podiatry standpoint pending final ID recommendations.   - Discussed w/ attending

## 2023-01-30 NOTE — PROGRESS NOTE ADULT - ATTENDING COMMENTS
Transitioned to po abx per ID input.     Hyperglycemia- insulin increased. Discussed w Hepatologist Dr Emre lange to decrease Prednisone to 15 mg.     D/c to rehab w f/u. D/c time 45 min.

## 2023-01-30 NOTE — PROGRESS NOTE ADULT - SUBJECTIVE AND OBJECTIVE BOX
Follow Up:      Interval History:    REVIEW OF SYSTEMS  [  ] ROS unobtainable because:    [  ] All other systems negative except as noted below    Constitutional:  [ ] fever [ ] chills  [ ] weight loss  [ ] weakness  Skin:  [ ] rash [ ] phlebitis	  Eyes: [ ] icterus [ ] pain  [ ] discharge	  ENMT: [ ] sore throat  [ ] thrush [ ] ulcers [ ] exudates  Respiratory: [ ] dyspnea [ ] hemoptysis [ ] cough [ ] sputum	  Cardiovascular:  [ ] chest pain [ ] palpitations [ ] edema	  Gastrointestinal:  [ ] nausea [ ] vomiting [ ] diarrhea [ ] constipation [ ] pain	  Genitourinary:  [ ] dysuria [ ] frequency [ ] hematuria [ ] discharge [ ] flank pain  [ ] incontinence  Musculoskeletal:  [ ] myalgias [ ] arthralgias [ ] arthritis  [ ] back pain  Neurological:  [ ] headache [ ] seizures  [ ] confusion/altered mental status    Allergies  codeine (Anaphylaxis)        ANTIMICROBIALS:  cefpodoxime 200 every 12 hours  metroNIDAZOLE    Tablet 500 two times a day      OTHER MEDS:  MEDICATIONS  (STANDING):  acetaminophen     Tablet .. 650 every 6 hours PRN  aluminum hydroxide/magnesium hydroxide/simethicone Suspension 30 every 4 hours PRN  dextrose 50% Injectable 25 once  dextrose 50% Injectable 12.5 once  dextrose 50% Injectable 25 once  dextrose Oral Gel 15 once PRN  glucagon  Injectable 1 once  heparin   Injectable 5000 every 8 hours  insulin glargine Injectable (LANTUS) 34 at bedtime  insulin lispro (ADMELOG) corrective regimen sliding scale  three times a day before meals  insulin lispro (ADMELOG) corrective regimen sliding scale  at bedtime  insulin lispro Injectable (ADMELOG) 12 three times a day before meals  melatonin 3 at bedtime PRN  mycophenolate mofetil 500 two times a day  ondansetron Injectable 4 every 8 hours PRN  pantoprazole    Tablet 40 before breakfast  predniSONE   Tablet 20 daily  psyllium Powder 1 daily  senna 2 at bedtime PRN      Vital Signs Last 24 Hrs  T(C): 36.8 (30 Jan 2023 15:27), Max: 36.8 (29 Jan 2023 23:08)  T(F): 98.3 (30 Jan 2023 15:27), Max: 98.3 (29 Jan 2023 23:08)  HR: 69 (30 Jan 2023 15:27) (69 - 72)  BP: 117/62 (30 Jan 2023 15:27) (117/62 - 139/61)  BP(mean): --  RR: 18 (30 Jan 2023 15:27) (18 - 18)  SpO2: 98% (30 Jan 2023 15:27) (93% - 98%)    Parameters below as of 30 Jan 2023 15:27  Patient On (Oxygen Delivery Method): room air        PHYSICAL EXAMINATION:  General: Alert and Awake, NAD  HEENT: PERRL, EOMI  Neck: Supple  Cardiac: RRR, No M/R/G  Resp: CTAB, No Wh/Rh/Ra  Abdomen: NBS, NT/ND, No HSM, No rigidity or guarding  MSK: No LE edema. No Calf tenderness  : No bob  Skin: No rashes or lesions. Skin is warm and dry to the touch.   Neuro: Alert and Awake. CN 2-12 Grossly intact. Moves all four extremities spontaneously.  Psych: Calm, Pleasant, Cooperative                          8.3    10.88 )-----------( 478      ( 30 Jan 2023 09:04 )             25.9       01-30    137  |  106  |  35<H>  ----------------------------<  229<H>  4.0   |  22  |  1.73<H>    Ca    8.9      30 Jan 2023 09:04  Phos  3.9     01-30  Mg     1.8     01-30    TPro  5.9<L>  /  Alb  2.8<L>  /  TBili  0.1<L>  /  DBili  x   /  AST  13  /  ALT  16  /  AlkPhos  120  01-30          MICROBIOLOGY:  v  .Tissue Other  01-25-23   No growth  --    Upon re-evaluation of gram stain:  No polymorphonuclear cells seen per low power field  No organisms seen  Previously reported as:  No polymorphonuclear cells seen per low power field  Few Gram positive cocci in pairs per oil power field      .Blood Blood-Peripheral  01-24-23   No Growth Final  --  --      Clean Catch Clean Catch (Midstream)  01-22-23   <10,000 CFU/mL Normal Urogenital Shantel  --  --      .Abscess Right foot  01-22-23   Few Klebsiella pneumoniae  Few Bacteroides thetaiotamcron group "Susceptibilities not performed"  Few Actinomyces odontolyticus "Susceptibilities not performed"  Few Streptococcus mitis/oralis group "Susceptibilities not performed"  Numerous Streptococcus agalactiae (Group B) isolated  Group B streptococci are susceptible to ampicillin,  penicillin and cefazolin, but may be resistant to  erythromycin and clindamycin.  Recommendations for intrapartum prophylaxis for Group B  streptococci are penicillin or ampicillin.  --  Klebsiella pneumoniae      .Blood Blood-Peripheral  01-22-23   **Please Note**: This is a Corrected Report**  Please disregard result, Bactec bottles mislabeled.  Previously reported as:  Growth in aerobic bottle: Gram positive cocci in pairs  ***Blood Panel PCR results on this specimen are available  approximately 3 hours after the Gram stain result.***  Gram stain, PCR, and/or culture results may not always  correspond due to difference in methodologies.  ************************************************************  This PCR assay was performed by multiplex PCR. This  Assay tests for 66 bacterial and resistance gene targets.  Please refer to the [facility] [employer] test directory  at [email or web address] for details.  --  Blood Culture PCR      .Blood Blood-Peripheral  01-22-23   **Please Note**: This is a Corrected Report**  Please disregard report, Bactec bottles are mislabeled.  Previously reported as:  Growth in aerobic bottle: Gram Positive Cocci in Clusters  ***Blood Panel PCR results on this specimen are available  approximately 3 hours after the Gram stain result.***  Gram stain, PCR, and/or culture results may not always  correspond due to difference in methodologies.  ************************************************************  This PCR assay was performed bymultiplex PCR. This  Assay tests for 66 bacterial and resistance gene targets.  Please refer to the [facility] [employer] test directory  at [email or web address] for details.  --  Blood Culture PCR                RADIOLOGY:    <The imaging below has been reviewed and visualized by me independently. Findings as detailed in report below> Follow Up:  Toe infection    Interval History: afebrile. diarrhea resolved.     REVIEW OF SYSTEMS  [  ] ROS unobtainable because:    [ x ] All other systems negative except as noted below    Constitutional:  [ ] fever [ ] chills  [ ] weight loss  [ ] weakness  Skin:  [ ] rash [ ] phlebitis	  Eyes: [ ] icterus [ ] pain  [ ] discharge	  ENMT: [ ] sore throat  [ ] thrush [ ] ulcers [ ] exudates  Respiratory: [ ] dyspnea [ ] hemoptysis [ ] cough [ ] sputum	  Cardiovascular:  [ ] chest pain [ ] palpitations [ ] edema	  Gastrointestinal:  [ ] nausea [ ] vomiting [ ] diarrhea [ ] constipation [ ] pain	  Genitourinary:  [ ] dysuria [ ] frequency [ ] hematuria [ ] discharge [ ] flank pain  [ ] incontinence  Musculoskeletal:  [ ] myalgias [ ] arthralgias [ ] arthritis  [ ] back pain  Neurological:  [ ] headache [ ] seizures  [ ] confusion/altered mental status    Allergies  codeine (Anaphylaxis)        ANTIMICROBIALS:  cefpodoxime 200 every 12 hours  metroNIDAZOLE    Tablet 500 two times a day      OTHER MEDS:  MEDICATIONS  (STANDING):  acetaminophen     Tablet .. 650 every 6 hours PRN  aluminum hydroxide/magnesium hydroxide/simethicone Suspension 30 every 4 hours PRN  dextrose 50% Injectable 25 once  dextrose 50% Injectable 12.5 once  dextrose 50% Injectable 25 once  dextrose Oral Gel 15 once PRN  glucagon  Injectable 1 once  heparin   Injectable 5000 every 8 hours  insulin glargine Injectable (LANTUS) 34 at bedtime  insulin lispro (ADMELOG) corrective regimen sliding scale  three times a day before meals  insulin lispro (ADMELOG) corrective regimen sliding scale  at bedtime  insulin lispro Injectable (ADMELOG) 12 three times a day before meals  melatonin 3 at bedtime PRN  mycophenolate mofetil 500 two times a day  ondansetron Injectable 4 every 8 hours PRN  pantoprazole    Tablet 40 before breakfast  predniSONE   Tablet 20 daily  psyllium Powder 1 daily  senna 2 at bedtime PRN      Vital Signs Last 24 Hrs  T(C): 36.8 (30 Jan 2023 15:27), Max: 36.8 (29 Jan 2023 23:08)  T(F): 98.3 (30 Jan 2023 15:27), Max: 98.3 (29 Jan 2023 23:08)  HR: 69 (30 Jan 2023 15:27) (69 - 72)  BP: 117/62 (30 Jan 2023 15:27) (117/62 - 139/61)  BP(mean): --  RR: 18 (30 Jan 2023 15:27) (18 - 18)  SpO2: 98% (30 Jan 2023 15:27) (93% - 98%)    Parameters below as of 30 Jan 2023 15:27  Patient On (Oxygen Delivery Method): room air    PHYSICAL EXAMINATION:  General: Alert and Awake, NAD  HEENT: Normocephalic / Atraumatic  Resp: No accessory muscles of respiration utilized  Abdomen: Not distended.  MSK: RLE with overlying dressing.   : No bob  Skin: No rashes or lesions.    Neuro: Alert and Awake. CN 2-12 Grossly intact. Moves all four extremities spontaneously.  Psych: Calm, Pleasant, Cooperative                          8.3    10.88 )-----------( 478      ( 30 Jan 2023 09:04 )             25.9       01-30    137  |  106  |  35<H>  ----------------------------<  229<H>  4.0   |  22  |  1.73<H>    Ca    8.9      30 Jan 2023 09:04  Phos  3.9     01-30  Mg     1.8     01-30    TPro  5.9<L>  /  Alb  2.8<L>  /  TBili  0.1<L>  /  DBili  x   /  AST  13  /  ALT  16  /  AlkPhos  120  01-30          MICROBIOLOGY:  v  .Tissue Other  01-25-23   No growth  --    Upon re-evaluation of gram stain:  No polymorphonuclear cells seen per low power field  No organisms seen  Previously reported as:  No polymorphonuclear cells seen per low power field  Few Gram positive cocci in pairs per oil power field      .Blood Blood-Peripheral  01-24-23   No Growth Final  --  --      Clean Catch Clean Catch (Midstream)  01-22-23   <10,000 CFU/mL Normal Urogenital Shantel  --  --      .Abscess Right foot  01-22-23   Few Klebsiella pneumoniae  Few Bacteroides thetaiotamcron group "Susceptibilities not performed"  Few Actinomyces odontolyticus "Susceptibilities not performed"  Few Streptococcus mitis/oralis group "Susceptibilities not performed"  Numerous Streptococcus agalactiae (Group B) isolated  Group B streptococci are susceptible to ampicillin,  penicillin and cefazolin, but may be resistant to  erythromycin and clindamycin.  Recommendations for intrapartum prophylaxis for Group B  streptococci are penicillin or ampicillin.  --  Klebsiella pneumoniae      .Blood Blood-Peripheral  01-22-23   **Please Note**: This is a Corrected Report**  Please disregard result, Bactec bottles mislabeled.  Previously reported as:  Growth in aerobic bottle: Gram positive cocci in pairs  ***Blood Panel PCR results on this specimen are available  approximately 3 hours after the Gram stain result.***  Gram stain, PCR, and/or culture results may not always  correspond due to difference in methodologies.  ************************************************************  This PCR assay was performed by multiplex PCR. This  Assay tests for 66 bacterial and resistance gene targets.  Please refer to the [facility] [employer] test directory  at [email or web address] for details.  --  Blood Culture PCR      .Blood Blood-Peripheral  01-22-23   **Please Note**: This is a Corrected Report**  Please disregard report, Bactec bottles are mislabeled.  Previously reported as:  Growth in aerobic bottle: Gram Positive Cocci in Clusters  ***Blood Panel PCR results on this specimen are available  approximately 3 hours after the Gram stain result.***  Gram stain, PCR, and/or culture results may not always  correspond due to difference in methodologies.  ************************************************************  This PCR assay was performed bymultiplex PCR. This  Assay tests for 66 bacterial and resistance gene targets.  Please refer to the [facility] [employer] test directory  at [email or web address] for details.  --  Blood Culture PCR    RADIOLOGY:    <The imaging below has been reviewed and visualized by me independently. Findings as detailed in report below>    < from: Xray Foot AP + Lateral + Oblique, Right (01.25.23 @ 10:22) >  IMPRESSION:  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 Elliptical configuration surgical   skin staples over lateral aspect of the forefoot/midfoot related to graft   placement.    Soft tissue lucencies in distal Achilles region apparent on lateral view   probably correlate with concurrently performed LEAH procedure. Otherwise,   no proximally tracking gas collections beyond the amputation margins and   no focal areas of osteomyelitis.    Remainder of foot unchanged. Also correlate with intraoperative findings.    < end of copied text >

## 2023-01-30 NOTE — PROGRESS NOTE ADULT - PROVIDER SPECIALTY LIST ADULT
Gastroenterology
Gastroenterology
Podiatry
Gastroenterology
Podiatry
Podiatry
Transplant Hepatology
Transplant ID
Gastroenterology
Gastroenterology
Podiatry
Transplant Hepatology
Internal Medicine

## 2023-01-30 NOTE — PROGRESS NOTE ADULT - PROBLEM SELECTOR PLAN 2
XR of foot c/f osteomyelitis  -Podiatry consulted in ED, recs appreciated  -F/u final surgical path; if negative -- will treat as SSTI; if positive will need line for OM  -broad spectrum coverage incl. anaerobes w/ vanc (dosed by level)/zosyn  -MRI R foot showed signs of osteomyelitis; s/pTMA by podiatry with possible biospy on 1/25 Pt with known CKD, b/l SCr 1.6-1.8 now at baseline  - Urine lytes c/w pre-renal, responded to trial of IVF  - monitor urine output

## 2023-01-30 NOTE — PATIENT PROFILE ADULT - FALL HARM RISK - HARM RISK INTERVENTIONS

## 2023-01-30 NOTE — PROGRESS NOTE ADULT - ASSESSMENT
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    Liver transplant recipient with recurrent LE infections and OM  Valcyte stopped; two negative CMV PCR's  Recurrence of infection at R foot partial 5th ray amputation  OF NOTE: No GBS Bacteremia - this was a micro lab error  Wound culture with polymicrobial growth  s/p RLE TMA with low concern for residual infection    Initially clean bone cultures with GPC on gram stain but this was corrected to no organisms visualized  No growth on clean bone cultures to date  Would discharge on Cefpodoxime 200 bid + metronidazole 500 bid through 2/1/23    #OM 4th metatarsal and proximal phalanx, ?OM remaining 5th and 1st proximal phalanx - s/p R TMA on 1/25 with low suspicion for residual infection   #Positive Culture Finding (Wound Culture) - polymicrobial   #Immunosuppressed, liver transplant recipient   #CMV Viremia -cleared    I will sign off at this time. Please feel free to contact me with any further questions or concerns.    Eusebio Pérez M.D.  [facility] Division of Infectious Disease  8AM-5PM Monday - Friday: Available on [employer] medical Teams  After Hours and Holidays (or if no response on [employer] Teams): Please contact the Infectious Diseases Office at [phone]     The above assessment and plan were discussed with medicine resident

## 2023-01-30 NOTE — PROGRESS NOTE ADULT - ASSESSMENT
osteomyelitis   diarrhea    s/p OR  GI pcr negative  cmv negative  valcyte dced by ID  monitor GI fn  diet as tolerated  transplant ID and hepatology following  d/w patient    I reviewed the overnight course of events on the unit, re-confirming the patient history. I discussed the care with the patient and their family  The plan of care was discussed with the physician assistant and modifications were made to the notation where appropriate.   Differential diagnosis and plan of care discussed with patient after the evaluation  35 minutes spent on total encounter of which more than fifty percent of the encounter was spent counseling and/or coordinating care by the attending physician.  Advanced care planning was discussed with patient and family.  Advanced care planning forms were reviewed and discussed.  Risks, benefits and alternatives of gastroenterologic procedures were discussed in detail and all questions were answered.

## 2023-01-30 NOTE — PROGRESS NOTE ADULT - PROBLEM SELECTOR PROBLEM 7
[id number]    Pt A/O to self only, no c/o pain. Tmax 100.9 in AM, MD notified & tylenol given, awaiting blood cultures. RA, LS diminished, infrequent nonproductive cough. Sinus cordelia to 40s overnight, remained asymptomatic. Straight cath done at beginning of shift for >1L, one ep of incontinence in AM, PVR of <100mL. Daughter currently at bedside, pt more calm/cooperative overnight & was able to get some sleep.  Please refer to flowsheets for further detail.      Goal Outcome Evaluation:      Plan of Care Reviewed With: child    Problem: Plan of Care - These are the overarching goals to be used throughout the patient stay.    Goal: Absence of Hospital-Acquired Illness or Injury  Intervention: Prevent Skin Injury  Recent Flowsheet Documentation  Taken 12/30/2022 0400 by Madisyn Hernandez, RN  Body Position: position changed independently  Taken 12/30/2022 0000 by Madisyn Hernandez, RN  Body Position: position changed independently     Problem: Risk for Delirium  Goal: Optimal Coping  Outcome: Progressing     Problem: Risk for Delirium  Goal: Improved Behavioral Control  Intervention: Minimize Safety Risk  Recent Flowsheet Documentation  Taken 12/30/2022 0400 by Madisyn Hernandez, RN  Enhanced Safety Measures: bed alarm set  Taken 12/30/2022 0000 by Madisyn Hernandez RN  Enhanced Safety Measures: bed alarm set     Problem: Risk for Delirium  Goal: Improved Sleep  Outcome: Progressing     Problem: Fever  Goal: Fever: Plan of Care  Outcome: Progressing      DM (diabetes mellitus)

## 2023-01-30 NOTE — PROGRESS NOTE ADULT - SUBJECTIVE AND OBJECTIVE BOX
PROGRESS NOTE:     Patient is a 67y old  Male who presents with a chief complaint of cellulitis vs ostemolyelitis (30 Jan 2023 08:29)      SUBJECTIVE / OVERNIGHT EVENTS:    Patient seen and examined. No acute events overnight.    ADDITIONAL REVIEW OF SYSTEMS: 10 point ROS negative except per HPI    MEDICATIONS  (STANDING):  cefTRIAXone   IVPB 2000 milliGRAM(s) IV Intermittent every 24 hours  dextrose 5%. 1000 milliLiter(s) (50 mL/Hr) IV Continuous <Continuous>  dextrose 5%. 1000 milliLiter(s) (100 mL/Hr) IV Continuous <Continuous>  dextrose 50% Injectable 25 Gram(s) IV Push once  dextrose 50% Injectable 12.5 Gram(s) IV Push once  dextrose 50% Injectable 25 Gram(s) IV Push once  folic acid 1 milliGRAM(s) Oral daily  glucagon  Injectable 1 milliGRAM(s) IntraMuscular once  heparin   Injectable 5000 Unit(s) SubCutaneous every 8 hours  insulin glargine Injectable (LANTUS) 26 Unit(s) SubCutaneous at bedtime  insulin lispro (ADMELOG) corrective regimen sliding scale   SubCutaneous three times a day before meals  insulin lispro (ADMELOG) corrective regimen sliding scale   SubCutaneous at bedtime  insulin lispro Injectable (ADMELOG) 8 Unit(s) SubCutaneous three times a day before meals  lactated ringers. 1000 milliLiter(s) (75 mL/Hr) IV Continuous <Continuous>  lactobacillus acidophilus 1 Tablet(s) Oral daily  lidocaine   4% Patch 1 Patch Transdermal daily  metroNIDAZOLE    Tablet 500 milliGRAM(s) Oral two times a day  mycophenolate mofetil 500 milliGRAM(s) Oral two times a day  pantoprazole    Tablet 40 milliGRAM(s) Oral before breakfast  predniSONE   Tablet 20 milliGRAM(s) Oral daily  psyllium Powder 1 Packet(s) Oral daily    MEDICATIONS  (PRN):  acetaminophen     Tablet .. 650 milliGRAM(s) Oral every 6 hours PRN Temp greater or equal to 38C (100.4F), Mild Pain (1 - 3)  aluminum hydroxide/magnesium hydroxide/simethicone Suspension 30 milliLiter(s) Oral every 4 hours PRN Dyspepsia  dextrose Oral Gel 15 Gram(s) Oral once PRN Blood Glucose LESS THAN 70 milliGRAM(s)/deciliter  melatonin 3 milliGRAM(s) Oral at bedtime PRN Insomnia  ondansetron Injectable 4 milliGRAM(s) IV Push every 8 hours PRN Nausea and/or Vomiting  senna 2 Tablet(s) Oral at bedtime PRN Constipation      CAPILLARY BLOOD GLUCOSE      POCT Blood Glucose.: 248 mg/dL (30 Jan 2023 08:08)  POCT Blood Glucose.: 277 mg/dL (29 Jan 2023 21:29)  POCT Blood Glucose.: 244 mg/dL (29 Jan 2023 16:26)  POCT Blood Glucose.: 195 mg/dL (29 Jan 2023 11:55)    I&O's Summary    29 Jan 2023 07:01  -  30 Jan 2023 07:00  --------------------------------------------------------  IN: 1025 mL / OUT: 2525 mL / NET: -1500 mL        PHYSICAL EXAM:  Vital Signs Last 24 Hrs  T(C): 36.8 (29 Jan 2023 23:08), Max: 36.8 (29 Jan 2023 23:08)  T(F): 98.3 (29 Jan 2023 23:08), Max: 98.3 (29 Jan 2023 23:08)  HR: 72 (29 Jan 2023 23:08) (68 - 72)  BP: 130/60 (29 Jan 2023 23:08) (126/61 - 138/74)  BP(mean): --  RR: 18 (29 Jan 2023 23:08) (18 - 18)  SpO2: 93% (29 Jan 2023 23:08) (93% - 98%)    Parameters below as of 29 Jan 2023 23:08  Patient On (Oxygen Delivery Method): room air        CONSTITUTIONAL: NAD, well-developed  RESPIRATORY: Normal respiratory effort; lungs are clear to auscultation bilaterally  CARDIOVASCULAR: Regular rate and rhythm, normal S1 and S2, no murmur/rub/gallop; No lower extremity edema; Peripheral pulses are 2+ bilaterally  ABDOMEN: Nontender to palpation, normoactive bowel sounds, no rebound/guarding; No hepatosplenomegaly  MUSCLOSKELETAL: no clubbing or cyanosis of digits; no joint swelling or tenderness to palpation  NEURO: CN 2-12 grossly intact, moves all limbs spontaneously  PSYCH: A+O to person, place, and time; affect appropriate    LABS:                        8.3    10.88 )-----------( 478      ( 30 Jan 2023 09:04 )             25.9     01-30    137  |  106  |  35<H>  ----------------------------<  229<H>  4.0   |  22  |  1.73<H>    Ca    8.9      30 Jan 2023 09:04  Phos  3.8     01-29  Mg     1.9     01-29    TPro  5.9<L>  /  Alb  2.8<L>  /  TBili  0.1<L>  /  DBili  x   /  AST  13  /  ALT  16  /  AlkPhos  120  01-30                RADIOLOGY & ADDITIONAL TESTS:  Results Reviewed: Y  Imaging Personally Reviewed: Y    COORDINATION OF CARE:  Care Discussed with Consultants/Other Providers [Y/N]: Y  Prior or Outpatient Records Reviewed [Y/N]: MILTON Sanders PGY1  Can be reached on MS teams   PROGRESS NOTE:   Patient is a 67y old  Male who presents with a chief complaint of cellulitis vs ostemolyelitis    SUBJECTIVE / OVERNIGHT EVENTS:  No acute events overnight.    MEDICATIONS  (STANDING):  cefTRIAXone   IVPB 2000 milliGRAM(s) IV Intermittent every 24 hours  dextrose 5%. 1000 milliLiter(s) (50 mL/Hr) IV Continuous <Continuous>  dextrose 5%. 1000 milliLiter(s) (100 mL/Hr) IV Continuous <Continuous>  dextrose 50% Injectable 25 Gram(s) IV Push once  dextrose 50% Injectable 12.5 Gram(s) IV Push once  dextrose 50% Injectable 25 Gram(s) IV Push once  folic acid 1 milliGRAM(s) Oral daily  glucagon  Injectable 1 milliGRAM(s) IntraMuscular once  heparin   Injectable 5000 Unit(s) SubCutaneous every 8 hours  insulin glargine Injectable (LANTUS) 26 Unit(s) SubCutaneous at bedtime  insulin lispro (ADMELOG) corrective regimen sliding scale   SubCutaneous three times a day before meals  insulin lispro (ADMELOG) corrective regimen sliding scale   SubCutaneous at bedtime  insulin lispro Injectable (ADMELOG) 8 Unit(s) SubCutaneous three times a day before meals  lactated ringers. 1000 milliLiter(s) (75 mL/Hr) IV Continuous <Continuous>  lactobacillus acidophilus 1 Tablet(s) Oral daily  lidocaine   4% Patch 1 Patch Transdermal daily  metroNIDAZOLE    Tablet 500 milliGRAM(s) Oral two times a day  mycophenolate mofetil 500 milliGRAM(s) Oral two times a day  pantoprazole    Tablet 40 milliGRAM(s) Oral before breakfast  predniSONE   Tablet 20 milliGRAM(s) Oral daily  psyllium Powder 1 Packet(s) Oral daily    MEDICATIONS  (PRN):  acetaminophen     Tablet .. 650 milliGRAM(s) Oral every 6 hours PRN Temp greater or equal to 38C (100.4F), Mild Pain (1 - 3)  aluminum hydroxide/magnesium hydroxide/simethicone Suspension 30 milliLiter(s) Oral every 4 hours PRN Dyspepsia  dextrose Oral Gel 15 Gram(s) Oral once PRN Blood Glucose LESS THAN 70 milliGRAM(s)/deciliter  melatonin 3 milliGRAM(s) Oral at bedtime PRN Insomnia  ondansetron Injectable 4 milliGRAM(s) IV Push every 8 hours PRN Nausea and/or Vomiting  senna 2 Tablet(s) Oral at bedtime PRN Constipation    CAPILLARY BLOOD GLUCOSE  POCT Blood Glucose.: 248 mg/dL (30 Jan 2023 08:08)  POCT Blood Glucose.: 277 mg/dL (29 Jan 2023 21:29)  POCT Blood Glucose.: 244 mg/dL (29 Jan 2023 16:26)  POCT Blood Glucose.: 195 mg/dL (29 Jan 2023 11:55)    I&O's Summary  29 Jan 2023 07:01  -  30 Jan 2023 07:00  --------------------------------------------------------  IN: 1025 mL / OUT: 2525 mL / NET: -1500 mL    Vital Signs Last 24 Hrs  T(C): 36.8 (29 Jan 2023 23:08), Max: 36.8 (29 Jan 2023 23:08)  T(F): 98.3 (29 Jan 2023 23:08), Max: 98.3 (29 Jan 2023 23:08)  HR: 72 (29 Jan 2023 23:08) (68 - 72)  BP: 130/60 (29 Jan 2023 23:08) (126/61 - 138/74)  RR: 18 (29 Jan 2023 23:08) (18 - 18)  SpO2: 93% (29 Jan 2023 23:08) (93% - 98%) on room air    PHYSICAL EXAM:  General: NAD  HEENT: NC/AT; PERRL, clear conjunctiva  Neck: supple  Respiratory: CTA b/l  Cardiovascular: +S1/S2; RRR  Abdomen: soft, NT/ND; +BS x4  Extremities:  no LE edema; Kerlix dressing in place over R toes  Vascular: WWP, 1+ peripheral pulses b/l;  Skin: normal color and turgor; no rash  Neurological: A&Ox3, move all extremities. CN II-XII intact. No sensation in bilateral feet.     LABS:                8.3    10.88 )-----------( 478                 25.9     137  |  106  |  35<H>  ----------------------------<  229<H>  4.0   |  22  |  1.73<H>    Ca    8.9      30 Jan 2023 09:04  Phos  3.8     01-29  Mg     1.9     01-29    TPro  5.9<L>  /  Alb  2.8<L>  /  TBili  0.1<L>  /  DBili  x   /  AST  13  /  ALT  16  /  AlkPhos  120  01-30    COORDINATION OF CARE:  Care Discussed with Consultants/Other Providers [Y/N]: Y  Prior or Outpatient Records Reviewed [Y/N]: MILTON Sanders, PGY1  Can be reached on MS teams

## 2023-01-30 NOTE — PROGRESS NOTE ADULT - SUBJECTIVE AND OBJECTIVE BOX
[facility]  Ramón Falk PA-C  [address] [place] [address]  [phone]      INTERVAL HPI/OVERNIGHT EVENTS:    patient s/e  events noted      MEDICATIONS  (STANDING):  cefTRIAXone   IVPB 2000 milliGRAM(s) IV Intermittent every 24 hours  dextrose 5%. 1000 milliLiter(s) (50 mL/Hr) IV Continuous <Continuous>  dextrose 5%. 1000 milliLiter(s) (100 mL/Hr) IV Continuous <Continuous>  dextrose 50% Injectable 25 Gram(s) IV Push once  dextrose 50% Injectable 12.5 Gram(s) IV Push once  dextrose 50% Injectable 25 Gram(s) IV Push once  folic acid 1 milliGRAM(s) Oral daily  glucagon  Injectable 1 milliGRAM(s) IntraMuscular once  heparin   Injectable 5000 Unit(s) SubCutaneous every 8 hours  insulin glargine Injectable (LANTUS) 18 Unit(s) SubCutaneous at bedtime  insulin lispro (ADMELOG) corrective regimen sliding scale   SubCutaneous three times a day before meals  insulin lispro (ADMELOG) corrective regimen sliding scale   SubCutaneous at bedtime  insulin lispro Injectable (ADMELOG) 4 Unit(s) SubCutaneous three times a day before meals  lactated ringers. 1000 milliLiter(s) (75 mL/Hr) IV Continuous <Continuous>  lactobacillus acidophilus 1 Tablet(s) Oral daily  lidocaine   4% Patch 1 Patch Transdermal daily  metroNIDAZOLE    Tablet 500 milliGRAM(s) Oral two times a day  mycophenolate mofetil 500 milliGRAM(s) Oral two times a day  pantoprazole    Tablet 40 milliGRAM(s) Oral before breakfast  predniSONE   Tablet 20 milliGRAM(s) Oral daily  psyllium Powder 1 Packet(s) Oral daily    MEDICATIONS  (PRN):  acetaminophen     Tablet .. 650 milliGRAM(s) Oral every 6 hours PRN Temp greater or equal to 38C (100.4F), Mild Pain (1 - 3)  aluminum hydroxide/magnesium hydroxide/simethicone Suspension 30 milliLiter(s) Oral every 4 hours PRN Dyspepsia  dextrose Oral Gel 15 Gram(s) Oral once PRN Blood Glucose LESS THAN 70 milliGRAM(s)/deciliter  melatonin 3 milliGRAM(s) Oral at bedtime PRN Insomnia  ondansetron Injectable 4 milliGRAM(s) IV Push every 8 hours PRN Nausea and/or Vomiting  senna 2 Tablet(s) Oral at bedtime PRN Constipation      Allergies    codeine (Anaphylaxis)    Intolerances        ROS:   General:  No  fevers, chills, night sweats, fatigue,   Eyes:  Good vision, no reported pain  ENT:  No sore throat, pain, runny nose, dysphagia  CV:  No pain, palpitations, hypo/hypertension  Resp:  No dyspnea, cough, tachypnea, wheezing  GI:  No pain, No nausea, No vomiting, No diarrhea, No constipation, No weight loss, No fever, No pruritis, No rectal bleeding, No tarry stools, No dysphagia,  :  No pain, bleeding, incontinence, nocturia  Muscle:  No pain, weakness  Neuro:  No weakness, tingling, memory problems  Psych:  No fatigue, insomnia, mood problems, depression  Endocrine:  No polyuria, polydipsia, cold/heat intolerance  Heme:  No petechiae, ecchymosis, easy bruisability  Skin:  No rash, tattoos, scars, edema      PHYSICAL EXAM:   Vital Signs Last 24 Hrs  T(C): 36.4 (29 Jan 2023 13:37), Max: 36.9 (28 Jan 2023 16:00)  T(F): 97.6 (29 Jan 2023 13:37), Max: 98.5 (28 Jan 2023 16:00)  HR: 68 (29 Jan 2023 13:37) (68 - 69)  BP: 126/61 (29 Jan 2023 13:37) (126/61 - 135/70)  BP(mean): --  RR: 18 (29 Jan 2023 13:37) (18 - 18)  SpO2: 98% (29 Jan 2023 13:37) (97% - 98%)    Parameters below as of 29 Jan 2023 13:37  Patient On (Oxygen Delivery Method): room air      Daily     Daily     GENERAL:  Appears stated age,   HEENT:  NC/AT,    CHEST:  Full & symmetric excursion,   HEART:  Regular rhythm,  ABDOMEN:  Soft, non-tender, non-distended,  EXTEREMITIES:  no cyanosis  SKIN:  No rash  NEURO:  Alert,       LABS:                                          8.4    9.66  )-----------( 481      ( 28 Jan 2023 09:18 )             27.0                8.4    9.66  )-----------( 481      ( 28 Jan 2023 09:18 )             27.0   01-29    136  |  105  |  36<H>  ----------------------------<  234<H>  4.1   |  19<L>  |  1.54<H>    Ca    8.9      29 Jan 2023 09:44  Phos  3.8     01-29  Mg     1.9     01-29    TPro  6.2  /  Alb  2.6<L>  /  TBili  0.1<L>  /  DBili  x   /  AST  13  /  ALT  20  /  AlkPhos  126<H>  01-29            PT/INR - ( 25 Jan 2023 07:17 )   PT: 13.2 sec;   INR: 1.15 ratio         PTT - ( 25 Jan 2023 07:17 )  PTT:25.5 sec      RADIOLOGY & ADDITIONAL TESTS:

## 2023-01-30 NOTE — PROGRESS NOTE ADULT - PROBLEM SELECTOR PLAN 3
Pt with known CKD, b/l SCr 1.6-1.8 (1.89 on discharge 3 weeks prior to admission 12/30/2022), now at baseline  -Urine lytes c/w pre-renal; trial IVF  -F/u AM BMP  -monitor urine output On lantus 14u nightly, admelog 9u TID at home  - lantus 26units and lispro 8U here   - adjust insulin per SS requirements

## 2023-01-31 NOTE — ED PROVIDER NOTE - ADMIT DISPOSITION PRESENT ON ADMISSION SEPSIS Q1 - RE-EVALUATED PATIENT FLUID AND VITAL SIGNS
In no apparent distress. I have re-evaluated the patient's fluid status and reviewed vital signs. Clinical perfusion assessment was performed. normal (ped)...

## 2023-02-10 NOTE — PROGRESS NOTE ADULT - PROVIDER SPECIALTY LIST ADULT
Cardiology Patient states he has not checked his blood pressure since Tuesday.  Patient is not able to check his BP now, he is in the car.    He mentions when he skipped the amlodipine, possibly Tuesday he did not have a headache.  Right now he states the headache is not bad and he will keep taking the amlodipine because the headache is nothing he cannot handle.  Patient states he will have his daughter check his BP throughout the weekend and bring in his numbers on Monday when he has a nurse visit.    Patient was informed if his headache gets worse and his BP in high, he should be evaluated through the ED

## 2023-02-14 NOTE — ASSESSMENT
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 \par - Right foot lateral 5th met wound debrided to capsule level, no tracking pus noted, however malodor, moderate necrotic tissue noted. wound culture obtained.\par - Clean Bone Margin Culture: No growth.\par - Clean Bone Margin Pathology: No osteomyelitis.\par - Rx Augmentin for 10 days.\par - Pt to complete Xray\par - Pt to ED if N/C/V/SOB symptoms presents, verbal understanding demontrated\par - RTC in 2 weeks.

## 2023-02-14 NOTE — HISTORY OF PRESENT ILLNESS
[FreeTextEntry1] : Pt presents s/p right foot second toe amputation.  Pt was discharged on Augmentin but was unable to fill his prescription. He began changing his dressings with DSD every other day. During last visit,  Additionally, the right foot lateral fifth metatarsal wound was debrided to subQ,  Denies N/V/F/C/SOB, however reports some redness around the the RF lateral 5th met wound\par

## 2023-02-14 NOTE — PHYSICAL EXAM
[0] : left 0 [2+] : left 2+ [Please See PDF for [employer]] : Please See PDF for [employer]. [de-identified] : s/p right foot partial hallux and partial second ray resections. BL midtarsal joint 13, patella orientation central, ankle dorsiflexion 12 degrees, ankle plantarflexion 12 degrees, STJ supination 15 degrees, STJ pronation 9 degrees, hallux dorsiflexion 55 degrees, hallux plantarflexion 35 degrees, no toe contractures noted. [de-identified] : Right foot second toe incision site sutures intact, no hematoma formation, no fluctuance, skin edges well coapted, flaps warm and viable, no clinical signs of infection. Right foot lateral fifth metatarsal head eschar with periwound erythema, no excess heat, no drainage, no purulence. Right foot hallux prior incision site hyperkeratosis with no clinical signs of infection. Left foot no wounds, no signs of infection. [de-identified] : Bilateral sensation diminished to the level of the toes.

## 2023-02-17 NOTE — DIETITIAN INITIAL EVALUATION ADULT. - ADD RECOMMEND
no
1) Continue Consistent Carbohydrate, Low Sodium diet with snack. 2) Food preferences taken; RD will remove dietary restrictions on beef and tomato, per pt request, to allow greater menu choices. 3) Therapeutic diet education reviewed; RD will continue to reinforce in-house and prior to discharge.

## 2023-02-23 NOTE — ED ADULT NURSE NOTE - DOES PATIENT HAVE ADVANCE DIRECTIVE
no lesions, no deformities, no traumatic injuries, no significant scars are present, chest wall non-tender, no masses present, breathing is unlabored without accessory muscle use,normal breath sounds
No

## 2023-02-24 NOTE — PROGRESS NOTE ADULT - NEGATIVE OPHTHALMOLOGIC SYMPTOMS
Impression: Myopia, bilateral: H52.13. Plan: Discussed diagnosis in detail with patient. No glasses Rx was given today. Continue to monitor.
no pain L/no pain R

## 2023-03-07 NOTE — ASSESSMENT
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 \par - Right foot lateral 5th met wound debrided to capsule level, no tracking pus noted, however malodor, moderate necrotic tissue noted. wound culture obtained.\par - Clean Bone Margin Culture: No growth.\par - Clean Bone Margin Pathology: No osteomyelitis..\par - Pt to complete Xray\par - Pt to ED if N/C/V/SOB symptoms presents, verbal understanding demontrated\par 3/07/ 23\par Patient present with wound to the lateral aspect of their right foot. removed sutures today, continue with wound vacc\par \par wound be noted to be granular with some areas of fibrinous tissue. wound bed debrided down to the level of but not beyond subcutaneous tissue with a dermal curette. \par \par Patient read the consent and signed it prior to the initiation of any screening procedure. Patient had the opportunity to discuss any questions regarding the study. I witnessed the signing of the consent and the patient was given a copy of the signed consent.\par - RTC in 1 weeks.

## 2023-03-07 NOTE — ASSESSMENT
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 \par - Right foot lateral 5th met wound debrided to capsule level, no tracking pus noted, however malodor, moderate necrotic tissue noted. wound culture obtained.\par - Clean Bone Margin Culture: No growth.\par - Clean Bone Margin Pathology: No osteomyelitis..\par - Pt to complete Xray\par - Pt to ED if N/C/V/SOB symptoms presents, verbal understanding demontrated\par 2/14/ 23\par Patient present with wound to the lateral aspect of their right foot. staples and sutures intact patient currently using wound vac.\par \par wound be noted to be granular with some areas of fibrinous tissue. wound bed debrided down to the level of but not beyond subcutaneous tissue with a dermal curette. \par \par Patient read the consent and signed it prior to the initiation of any screening procedure. Patient had the opportunity to discuss any questions regarding the study. I witnessed the signing of the consent and the patient was given a copy of the signed consent.\par - RTC in 1 weeks.

## 2023-03-07 NOTE — PHYSICAL EXAM
[0] : left 0 [2+] : left 2+ [Please See PDF for [employer]] : Please See PDF for [employer]. [de-identified] : s/p right foot partial hallux and partial second ray resections. BL midtarsal joint 13, patella orientation central, ankle dorsiflexion 12 degrees, ankle plantarflexion 12 degrees, STJ supination 15 degrees, STJ pronation 9 degrees, hallux dorsiflexion 55 degrees, hallux plantarflexion 35 degrees, no toe contractures noted. [de-identified] : Right foot second toe incision site sutures intact, no hematoma formation, no fluctuance, skin edges well coapted, flaps warm and viable, no clinical signs of infection. Right foot lateral fifth metatarsal head eschar with periwound erythema, no excess heat, no drainage, no purulence. Right foot hallux prior incision site hyperkeratosis with no clinical signs of infection. Left foot no wounds, no signs of infection. [de-identified] : Bilateral sensation diminished to the level of the toes.

## 2023-03-07 NOTE — HISTORY OF PRESENT ILLNESS
[FreeTextEntry1] : Pt presents s/p right foot second toe amputation.  Pt was discharged on Augmentin but was unable to fill his prescription. He began changing his dressings with DSD every other day. During last visit,  Additionally, the right foot lateral fifth metatarsal wound was debrided to subQ,  Denies N/V/F/C/SOB Patient using vacc therapy.patient has cam walker and is partially weight bearing with use of  walker\par

## 2023-03-07 NOTE — ASSESSMENT
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 \par - Right foot lateral 5th met wound debrided to capsule level, no tracking pus noted, however malodor, moderate necrotic tissue noted. wound culture obtained.\par - Clean Bone Margin Culture: No growth.\par - Clean Bone Margin Pathology: No osteomyelitis..\par - Pt to complete Xray\par - Pt to ED if N/C/V/SOB symptoms presents, verbal understanding demontrated\par 2/21/ 23\par Patient present with wound to the lateral aspect of their right foot. staples and sutures intact patient currently using wound vac.\par \par wound be noted to be granular with some areas of fibrinous tissue. wound bed debrided down to the level of but not beyond subcutaneous tissue with a dermal curette. \par \par Patient read the consent and signed it prior to the initiation of any screening procedure. Patient had the opportunity to discuss any questions regarding the study. I witnessed the signing of the consent and the patient was given a copy of the signed consent.\par - RTC in 1 weeks continue with cam walker and partially weight bearing with use of walker., will consider future application of kerecis graft as part of staged procedure

## 2023-03-07 NOTE — PHYSICAL EXAM
[0] : left 0 [2+] : left 2+ [Please See PDF for [employer]] : Please See PDF for [employer]. [de-identified] : s/p right foot partial hallux and partial second ray resections. BL midtarsal joint 13, patella orientation central, ankle dorsiflexion 12 degrees, ankle plantarflexion 12 degrees, STJ supination 15 degrees, STJ pronation 9 degrees, hallux dorsiflexion 55 degrees, hallux plantarflexion 35 degrees, no toe contractures noted. [de-identified] : Right foot second toe incision site sutures intact, no hematoma formation, no fluctuance, skin edges well coapted, flaps warm and viable, no clinical signs of infection. Right foot lateral fifth metatarsal head eschar with periwound erythema, no excess heat, no drainage, no purulence. Right foot hallux prior incision site hyperkeratosis with no clinical signs of infection. Left foot no wounds, no signs of infection. [de-identified] : Bilateral sensation diminished to the level of the toes.

## 2023-03-07 NOTE — HISTORY OF PRESENT ILLNESS
[FreeTextEntry1] : Pt presents s/p right foot second toe amputation.  Pt was discharged on Augmentin but was unable to fill his prescription. He began changing his dressings with DSD every other day. During last visit,  Additionally, the right foot lateral fifth metatarsal wound was debrided to subQ,  Denies N/V/F/C/SOB, however reports some redness around the the RF lateral 5th met wound. Patient using vacc therapy.\par

## 2023-03-07 NOTE — PHYSICAL EXAM
[0] : left 0 [2+] : left 2+ [Please See PDF for [employer]] : Please See PDF for [employer]. [de-identified] : s/p right foot partial hallux and partial second ray resections. BL midtarsal joint 13, patella orientation central, ankle dorsiflexion 12 degrees, ankle plantarflexion 12 degrees, STJ supination 15 degrees, STJ pronation 9 degrees, hallux dorsiflexion 55 degrees, hallux plantarflexion 35 degrees, no toe contractures noted. [de-identified] : Right foot second toe incision site sutures intact, no hematoma formation, no fluctuance, skin edges well coapted, flaps warm and viable, no clinical signs of infection. Right foot lateral fifth metatarsal head eschar with periwound erythema, no excess heat, no drainage, no purulence. Right foot hallux prior incision site hyperkeratosis with no clinical signs of infection. Left foot no wounds, no signs of infection. [de-identified] : Bilateral sensation diminished to the level of the toes.

## 2023-03-08 PROBLEM — R19.7 DIARRHEA: Status: ACTIVE | Noted: 2023-01-01

## 2023-03-14 NOTE — ASSESSMENT
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 \par - Right foot lateral 5th met wound debrided to sub q and not beyond with dermal currette with wound gel and dsd applied today\par - Clean Bone Margin Culture: No growth.\par - Clean Bone Margin Pathology: No osteomyelitis..\par - Pt to ED if N/C/V/SOB symptoms presents, verbal understanding demontrated\par 3/07/ 23\par Patient present with wound to the lateral aspect of their right foot, continue with wound vacc\par \par wound be noted to be granular with some areas of fibrinous tissue. wound bed debrided down to the level of but not beyond subcutaneous tissue with a dermal curette. \par \par Patient read the consent and signed it prior to the initiation of any screening procedure. Patient had the opportunity to discuss any questions regarding the study. I witnessed the signing of the consent and the patient was given a copy of the signed consent.\par - RTC in 2 weeks, will contact insurance carrier for coverage of kerecis graft next visit and will inquire regarding topical e02

## 2023-03-14 NOTE — PHYSICAL EXAM
[0] : left 0 [2+] : left 2+ [Please See PDF for [employer]] : Please See PDF for [employer]. [de-identified] : s/p right foot partial hallux and partial second ray resections. BL midtarsal joint 13, patella orientation central, ankle dorsiflexion 12 degrees, ankle plantarflexion 12 degrees, STJ supination 15 degrees, STJ pronation 9 degrees, hallux dorsiflexion 55 degrees, hallux plantarflexion 35 degrees, no toe contractures noted. [de-identified] : Right foot second toe incision site sutures intact, no hematoma formation, no fluctuance, skin edges well coapted, flaps warm and viable, no clinical signs of infection. Right foot lateral fifth metatarsal head eschar with periwound erythema, no excess heat, no drainage, no purulence. Right foot hallux prior incision site hyperkeratosis with no clinical signs of infection. Left foot no wounds, no signs of infection. [de-identified] : Bilateral sensation diminished to the level of the toes.

## 2023-03-20 PROBLEM — Z79.899 LONG TERM CURRENT USE OF IMMUNOSUPPRESSIVE DRUG: Status: ACTIVE | Noted: 2020-07-21

## 2023-03-22 NOTE — PROGRESS NOTE ADULT - NSHPATTENDINGPLANDISCUSS_GEN_ALL_CORE
Dr. Nabeel Arreguin
ambulation/stair navigation      Manual Treatments:  PROM / STM / Oscillations-Mobs:  G-I, II, III, IV (PA's, Inf., Post.)  [] (14913) Provided manual therapy to mobilize LE, proximal hip and/or LS spine soft tissue/joints for the purpose of modulating pain, promoting relaxation,  increasing ROM, reducing/eliminating soft tissue swelling/inflammation/restriction, improving soft tissue extensibility and allowing for proper ROM for normal function with self care, mobility, lifting and ambulation. Modalities:      Charges:  Timed Code Treatment Minutes: 20'   Total Treatment Minutes: 36'       [x] EVAL (LOW) 455 1011 (typically 20 minutes face-to-face)  [] EVAL (MOD) 08864 (typically 30 minutes face-to-face)  [] EVAL (HIGH) 84665 (typically 45 minutes face-to-face)  [] RE-EVAL   [x] GQ(27900) x  1   [] IONTO  [] NMR (67927) x     [] VASO  [] Manual (93148) x      [] Other:  [] TA x      [] Mech Traction (73060)  [] ES(attended) (25339)      [] ES (un) (35111):     GOALS:   Patient stated goal: Return to PLOF unrestricted    [] Progressing: [] Met: [] Not Met: [] Adjusted     Therapist goals for Patient:   Short Term Goals: To be achieved in: 2 weeks  1. Independent in HEP and progression per patient tolerance, in order to prevent re-injury. [] Progressing: [] Met: [] Not Met: [] Adjusted   2. Patient will have a decrease in pain to facilitate improvement in movement, function, and ADLs as indicated by Functional Deficits. [] Progressing: [] Met: [] Not Met: [] Adjusted     Long Term Goals: To be achieved in: 12 weeks  1. Disability index score of <40% deficit on LEFS to assist with reaching prior level of function. [] Progressing: [] Met: [] Not Met: [] Adjusted  2. Patient will demonstrate increased AROM to 0-130 deg to allow for proper joint functioning as indicated by patients Functional Deficits. [] Progressing: [] Met: [] Not Met: [] Adjusted  3.  Patient will demonstrate an increase in Strength to
patient
patient
pt, ICU team
team

## 2023-04-07 NOTE — HISTORY OF PRESENT ILLNESS
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  Lateral aspect of the foot has remaining ulceration.  Patient had Kerecis graft applications which is helping to improve the wound.  Patient is nonweightbearing presents today in wheelchair and is in rehab facility.  Patient is here today for further graft application.  Patient currently using wound VAC therapy.  Patient later no fever chills night sweats nausea vomiting shortness of breath or calf tenderness.\par

## 2023-04-07 NOTE — PHYSICAL EXAM
[0] : left 0 [2+] : left 2+ [Please See PDF for [employer]] : Please See PDF for [employer]. [de-identified] : s/p right foot partial hallux and partial second ray resections. BL midtarsal joint 13, patella orientation central, ankle dorsiflexion 12 degrees, ankle plantarflexion 12 degrees, STJ supination 15 degrees, STJ pronation 9 degrees, hallux dorsiflexion 55 degrees, hallux plantarflexion 35 degrees, no toe contractures noted. [de-identified] : Right foot second toe incision site sutures intact, no hematoma formation, no fluctuance, skin edges well coapted, flaps warm and viable, no clinical signs of infection. Right foot lateral fifth metatarsal head eschar with periwound erythema, no excess heat, no drainage, no purulence. Right foot hallux prior incision site hyperkeratosis with no clinical signs of infection. Left foot no wounds, no signs of infection. [de-identified] : Bilateral sensation diminished to the level of the toes.

## 2023-04-07 NOTE — ASSESSMENT
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 \par - Right foot lateral 5th met wound debrided to sub q and not beyond with dermal currette with wound gel and dsd applied today\par - Clean Bone Margin Culture: No growth.\par - Clean Bone Margin Pathology: No osteomyelitis..\par - Pt to ED if N/C/V/SOB symptoms presents, verbal understanding demontrated\par 3/07/ 23\par Patient present with wound to the lateral aspect of their right foot, continue with wound vacc\par \par wound be noted to be granular with some areas of fibrinous tissue. wound bed debrided down to the level of but not beyond subcutaneous tissue with a dermal curette. 31 cm² of Apligraf to was applied to the right lateral ulceration site with wound veil/Hypafix and Allevyn foam dressing applied today.  Patient is awaiting topical 2 system and is to DC wound VAC at this time.\par \par Patient read the consent and signed it prior to the initiation of any screening procedure. Patient had the opportunity to discuss any questions regarding the study. I witnessed the signing of the consent and the patient was given a copy of the signed consent.\par - RTC in 2 weeks, will contact insurance carrier for coverage of kerecis graft next visit and will inquire regarding topical e02

## 2023-04-08 NOTE — PHYSICAL EXAM
[0] : left 0 [2+] : left 2+ [Please See PDF for [employer]] : Please See PDF for [employer]. [de-identified] : s/p right foot partial hallux and partial second ray resections. BL midtarsal joint 13, patella orientation central, ankle dorsiflexion 12 degrees, ankle plantarflexion 12 degrees, STJ supination 15 degrees, STJ pronation 9 degrees, hallux dorsiflexion 55 degrees, hallux plantarflexion 35 degrees, no toe contractures noted. [de-identified] : Right foot second toe incision site sutures intact, no hematoma formation, no fluctuance, skin edges well coapted, flaps warm and viable, no clinical signs of infection. Right foot lateral fifth metatarsal head eschar with periwound erythema, no excess heat, no drainage, no purulence. Right foot hallux prior incision site hyperkeratosis with no clinical signs of infection. Left foot no wounds, no signs of infection. [de-identified] : Bilateral sensation diminished to the level of the toes.

## 2023-04-08 NOTE — ASSESSMENT
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 \par - Right foot lateral 5th met wound debrided to sub q and not beyond with dermal currette with wound gel and dsd applied today\par - Clean Bone Margin Culture: No growth.\par - Clean Bone Margin Pathology: No osteomyelitis..\par - Pt to ED if N/C/V/SOB symptoms presents, verbal understanding demontrated\par \par Patient present with wound to the lateral aspect of their right foot, continue with wound vacc\par \par \par \par Patient read the consent and signed it prior to the initiation of any screening procedure. Patient had the opportunity to discuss any questions regarding the study. I witnessed the signing of the consent and the patient was given a copy of the signed consent.\par - RTC in 2 weeks, will contact insurance carrier for coverage of kerecis graft next visit and will inquire regarding topical e02

## 2023-04-18 NOTE — ASSESSMENT
[FreeTextEntry1] : 66M presents s/p right foot partial second toe amputation (DOS 11/9/22) and lateral fifth metatarsal eschar.\par - Pt was seen and evaluated. \par - Right foot lateral 5th met wound debrided to sub q and not beyond with dermal currette with erik dsd applied today\par - Clean Bone Margin Culture: No growth.\par - Clean Bone Margin Pathology: No osteomyelitis..\par - Pt to ED if N/C/V/SOB symptoms presents, verbal understanding demontrated\par \par Patient read the consent and signed it prior to the initiation of any screening procedure. Patient had the opportunity to discuss any questions regarding the study. I witnessed the signing of the consent and the patient was given a copy of the signed consent.\par - RTC in 2 weeks, will contact insurance carrier for coverage of kerecis graft next visit and will inquire regarding --cotninue with topical e02

## 2023-04-18 NOTE — HISTORY OF PRESENT ILLNESS
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  Lateral aspect of the foot has remaining ulceration.  Patient had Kerecis graft applications which is helping to improve the wound.  Patient is nonweightbearing presents today in wheelchair and is in rehab facility.  Patient currently using topical e02  Patient later no fever chills night sweats nausea vomiting shortness of breath or calf tenderness.\par

## 2023-04-18 NOTE — PHYSICAL EXAM
[0] : left 0 [2+] : left 2+ [Please See PDF for [employer]] : Please See PDF for [employer]. [de-identified] : s/p right foot partial hallux and partial second ray resections. BL midtarsal joint 13, patella orientation central, ankle dorsiflexion 12 degrees, ankle plantarflexion 12 degrees, STJ supination 15 degrees, STJ pronation 9 degrees, hallux dorsiflexion 55 degrees, hallux plantarflexion 35 degrees, no toe contractures noted. [de-identified] : Right foot second toe incision site sutures intact, no hematoma formation, no fluctuance, skin edges well coapted, flaps warm and viable, no clinical signs of infection. Right foot lateral fifth metatarsal head eschar with periwound erythema, no excess heat, no drainage, no purulence. Right foot hallux prior incision site hyperkeratosis with no clinical signs of infection. Left foot no wounds, no signs of infection. [de-identified] : Bilateral sensation diminished to the level of the toes.

## 2023-04-20 PROBLEM — Z94.4 LIVER TRANSPLANT RECIPIENT: Status: ACTIVE | Noted: 2020-07-21

## 2023-04-24 NOTE — PRE-OP CHECKLIST - RESPIRATORY RATE (BREATHS/MIN)
Partially impaired: cannot see medication labels or newsprint, but can see obstacles in path, and the surrounding layout; can count fingers at arm's length
18

## 2023-04-27 NOTE — PROGRESS NOTE ADULT - PROBLEM SELECTOR PLAN 1
-test BG AC/HS  -c/w Lantus 12 units QHS  -c/w Humalog 5 units AC meals. If BG less than 100mg/dl, reduce dose to 3 units w/meals  -c/w Humalog low correction scale AC and Low HS scale  DISPO: basal/bolus, with doses TBD  -Pt can resume f/u with Dr. Mathur or   [facility] [address] [place] [address] [address] [phone]. (1) Other Medications/None

## 2023-05-09 NOTE — PHYSICAL EXAM
[0] : left 0 [2+] : left 2+ [Please See PDF for [employer]] : Please See PDF for [employer]. [de-identified] : s/p right foot partial hallux and partial second ray resections. BL midtarsal joint 13, patella orientation central, ankle dorsiflexion 12 degrees, ankle plantarflexion 12 degrees, STJ supination 15 degrees, STJ pronation 9 degrees, hallux dorsiflexion 55 degrees, hallux plantarflexion 35 degrees, no toe contractures noted. [de-identified] : Right foot second toe incision site sutures intact, no hematoma formation, no fluctuance, skin edges well coapted, flaps warm and viable, no clinical signs of infection. Right foot lateral fifth metatarsal head eschar with periwound erythema, no excess heat, no drainage, no purulence. Right foot hallux prior incision site hyperkeratosis with no clinical signs of infection. Left foot no wounds, no signs of infection. [de-identified] : Bilateral sensation diminished to the level of the toes.

## 2023-05-22 NOTE — DIETITIAN INITIAL EVALUATION ADULT. - PROBLEM SELECTOR PROBLEM 10
"  Speech Treatment Note      Today's date: 2023  Patient name: Juany Barclay   : 2020  MRN: [id number]  Referring provider: Tommy Wells MD  Dx:   No diagnosis found  Start Time: 1106  Stop Time: 1140  Total time in clinic (min): 34 minutes     Visit Number: 17 in    Intervention certification from:   Intervention certification to: 2921  Testing: 2023      Subjective/Behavioral:  Pt arrived on time to session with mom  Seen 1:1 ST but in group with other SLP and 3 peers  He appeared visibly upset and needed max A to transition into session, as well as complete x1 obstacle gross motor course w/ peer  For rest of session, however, he had much better engagement! Short Term Goals:    6  Patient will utilize early developing phonemes /p,b,m,h,t,d,n/ in CV, CVCV, or VC word structure in 8/10 opp  PARTIALLY MET   At end of session, he spontaneously stated: \"go see mama\", \"I want mama\", \"show mama\"  To improve clarity of David's spontaneous language, therapist used recasting and expansion of his phrases  Targeted 'go' and 'stop' w/ peers and using functionally, but no imitations  Post model, Teresita Galeana will BRIDGETT use early developing pronouns (e g  \"I do\", \"my turn\", \"me\") >5x per session  Targeted, \"I want __\" , \"help me\", and \"my turn\" w/ peers  No imitations; however [place] was utilized to get point/request across  Teresita Galeana will engage appropriately and BRIDGETT with peers during play in 3/3 activities over 3 consecutive sessions  Engaged appropriately in 2/3 activities today- needed assistance w/ completion of gross motor activity  Sitting at table, he sat well- but did not consistently use words/phrases to request or negate w/ peers  Teresita Galeana will use 2+ word novel phrases to comment, engage with peers, request, or negate >10x in a session  He spontaneously produced, \"all done puppy\" when completing his craft w/ peers  Long Term Goals:  1    Patient will improve " receptive language skills to within age appropriate limits by discharge  PARTIALLY MET   2  Patient will improve expressive language skills to within age appropriate limits by discharge  PARTIALLY MET     Other:Patient's family member was present was present during today's session  and Patient was provided with home exercises/ activies to target goals in plan of care    Recommendations:Continue with Plan of Care Pedal edema

## 2023-05-22 NOTE — PROGRESS NOTE ADULT - SUBJECTIVE AND OBJECTIVE BOX
"Name: Aren Lewis      : 1954      MRN: [id number]  Encounter Provider: Nick Haji MD  Encounter Date: 2023   [facility]    Assessment & Plan     1  Acute pain of right shoulder  Assessment & Plan:  Patient's examination consistent with right biceps tendinitis as well as right rotator cuff tendinitis   -Patient has been given a referral for physical therapy  -She may take ibuprofen as needed for pain and inflammation  -She may also apply heat/ice to the affected area as needed    Orders:  -     Ambulatory Referral to Physical Therapy; Future           Subjective      HPI   The patient presents today complaining of right shoulder pain  She notes this is been an \"on and off\" issue in the past   She states she began having consistent pain in her shoulder about a month ago  She reports it flared up after a long day of stirring and cooking food  She also notes the pain is aggravated by lifting her arm above her head  She has tried Advil, ice and heat with mild improvement      ROS as per HPI    Current Outpatient Medications on File Prior to Visit   Medication Sig   • aspirin 81 mg chewable tablet Chew 81 mg daily   • Calcium-Magnesium-Vitamin D (CITRACAL CALCIUM+D PO) Take 650 mg by mouth in the morning   • cholecalciferol (VITAMIN D3) 25 mcg (1,000 units) tablet Take 1,000 mcg by mouth 2 (two) times a day   • Coenzyme Q10 (Co Q10) 100 MG CAPS Take by mouth   • latanoprost (XALATAN) 0 005 % ophthalmic solution Administer 1 drop to both eyes in the morning   • levothyroxine (Levoxyl) 88 mcg tablet Take 1 tablet (88 mcg total) by mouth daily   • losartan (COZAAR) 25 mg tablet Take 25 mg by mouth daily   • rosuvastatin (CRESTOR) 10 MG tablet Take 1 tablet (10 mg total) by mouth daily       Objective     /82   Pulse 67   Ht 5' 4\" (1 626 m)   Wt 67 3 kg (148 lb 6 4 oz)   SpO2 96%   BMI 25 47 kg/m²     BP Readings from Last 3 " CHIEF COMPLAINT: Liver transplant    Interval Events: HSAT performed overnight. No other issues.     REVIEW OF SYSTEMS:  [x] All other systems negative  [ ] Unable to assess ROS because ________    OBJECTIVE:  ICU Vital Signs Last 24 Hrs  T(C): 36.6 (10 Aug 2020 21:05), Max: 37 (10 Aug 2020 13:57)  T(F): 97.8 (10 Aug 2020 21:05), Max: 98.6 (10 Aug 2020 13:57)  HR: 88 (10 Aug 2020 21:05) (79 - 88)  BP: 128/64 (10 Aug 2020 21:05) (122/65 - 140/69)  BP(mean): --  ABP: --  ABP(mean): --  RR: 18 (10 Aug 2020 21:05) (18 - 18)  SpO2: 99% (10 Aug 2020 21:05) (99% - 100%)        08-10 @ 07:01  -  08-11 @ 07:00  --------------------------------------------------------  IN: 1400 mL / OUT: 2375 mL / NET: -975 mL      CAPILLARY BLOOD GLUCOSE      POCT Blood Glucose.: 200 mg/dL (11 Aug 2020 08:43)      PHYSICAL EXAM:  GENERAL: NAD, well-groomed, well-developed  CHEST/LUNG: Clear to percussion bilaterally; No rales, rhonchi, wheezing, or rubs  HEART: Regular rate and rhythm; No murmurs, rubs, or gallops  ABDOMEN: Soft, Nontender, Nondistended; Bowel sounds present  VASCULAR:  2+ Peripheral Pulses, No clubbing, cyanosis, or edema  LYMPH: No lymphadenopathy noted  SKIN: No rashes or lesions  NERVOUS SYSTEM:  Alert & Oriented X3, Good concentration    HOSPITAL MEDICATIONS:  MEDICATIONS  (STANDING):  aspirin enteric coated 81 milliGRAM(s) Oral daily  chlorhexidine 2% Cloths 1 Application(s) Topical <User Schedule>  collagenase Ointment 1 Application(s) Topical daily  everolimus (ZORTRESS) 4.5 milliGRAM(s) Oral <User Schedule>  filgrastim-sndz (ZARXIO) Injectable 480 MICROGram(s) SubCutaneous once  insulin glargine Injectable (LANTUS) 6 Unit(s) SubCutaneous at bedtime  insulin lispro (HumaLOG) corrective regimen sliding scale   SubCutaneous Before meals and at bedtime  insulin lispro Injectable (HumaLOG) 6 Unit(s) SubCutaneous three times a day before meals  magnesium oxide 800 milliGRAM(s) Oral two times a day with meals  mycophenolate mofetil 500 milliGRAM(s) Oral two times a day  nystatin    Suspension 648531 Unit(s) Oral four times a day  nystatin Cream 1 Application(s) Topical two times a day  pantoprazole    Tablet 40 milliGRAM(s) Oral before breakfast  predniSONE   Tablet 5 milliGRAM(s) Oral daily  tamsulosin 0.8 milliGRAM(s) Oral at bedtime  trimethoprim   80 mG/sulfamethoxazole 400 mG 1 Tablet(s) Oral daily  valGANciclovir 900 milliGRAM(s) Oral daily    MEDICATIONS  (PRN):  artificial  tears Solution 1 Drop(s) Both EYES two times a day PRN Dry Eyes  diphenhydrAMINE 25 milliGRAM(s) Oral <User Schedule> PRN Insomnia      LABS:                        8.3    2.01  )-----------( 107      ( 11 Aug 2020 06:13 )             25.0     08-11    140  |  105  |  30<H>  ----------------------------<  198<H>  4.4   |  24  |  0.96    Ca    9.1      11 Aug 2020 06:13  Phos  3.0     08-11  Mg     1.8     08-11    TPro  5.4<L>  /  Alb  3.4  /  TBili  0.3  /  DBili  0.1  /  AST  18  /  ALT  25  /  AlkPhos  109  08-11    PT/INR - ( 10 Aug 2020 06:04 )   PT: 12.5 sec;   INR: 1.05 ratio         PTT - ( 10 Aug 2020 06:04 )  PTT:29.6 sec          MICROBIOLOGY:     RADIOLOGY:  [ ] Reviewed and interpreted by me    Point of Care Ultrasound Findings:    PFT:    EKG:    HSAT: Moderate sleep apnea with 27.9 apnea events per hour Encounters:   05/22/23 140/82   03/08/23 160/74        Wt Readings from Last 3 Encounters:   05/22/23 67 3 kg (148 lb 6 4 oz)   03/08/23 66 2 kg (146 lb)       Physical Exam    General: NAD, Alert and oriented x3   HEENT: NCAT, EOMI, normal conjunctiva  Cardiovascular: RRR, normal S1 and S2, no m/r/g  Pulmonary: Normal respiratory effort, no wheezes, rales or rhonchi  MSK: Normal bulk and tone, Ocasio (+), empty can test (+), Apley scratch (+), Drop arm sign absent, no tenderness to palpation over AC or SC joint, proximal biceps tendon tenderness to palpation  Extremities: No lower extremity edema  Skin: Normal skin color, no rashes     Guilherme King MD CHIEF COMPLAINT: Liver transplant    Interval Events: HSAT performed overnight. No other issues.     REVIEW OF SYSTEMS:  [x] All other systems negative  [ ] Unable to assess ROS because ________    OBJECTIVE:  ICU Vital Signs Last 24 Hrs  T(C): 36.6 (10 Aug 2020 21:05), Max: 37 (10 Aug 2020 13:57)  T(F): 97.8 (10 Aug 2020 21:05), Max: 98.6 (10 Aug 2020 13:57)  HR: 88 (10 Aug 2020 21:05) (79 - 88)  BP: 128/64 (10 Aug 2020 21:05) (122/65 - 140/69)  BP(mean): --  ABP: --  ABP(mean): --  RR: 18 (10 Aug 2020 21:05) (18 - 18)  SpO2: 99% (10 Aug 2020 21:05) (99% - 100%)        08-10 @ 07:01  -  08-11 @ 07:00  --------------------------------------------------------  IN: 1400 mL / OUT: 2375 mL / NET: -975 mL      CAPILLARY BLOOD GLUCOSE      POCT Blood Glucose.: 200 mg/dL (11 Aug 2020 08:43)      PHYSICAL EXAM:  GENERAL: NAD, well-groomed, well-developed  CHEST/LUNG: Clear to percussion bilaterally; No rales, rhonchi, wheezing, or rubs  HEART: Regular rate and rhythm; No murmurs, rubs, or gallops  ABDOMEN: Soft, Nontender, Nondistended; Bowel sounds present  VASCULAR:  2+ Peripheral Pulses, No clubbing, cyanosis, or edema  LYMPH: No lymphadenopathy noted  SKIN: No rashes or lesions  NERVOUS SYSTEM:  Alert & Oriented X3, Good concentration    HOSPITAL MEDICATIONS:  MEDICATIONS  (STANDING):  aspirin enteric coated 81 milliGRAM(s) Oral daily  chlorhexidine 2% Cloths 1 Application(s) Topical <User Schedule>  collagenase Ointment 1 Application(s) Topical daily  everolimus (ZORTRESS) 4.5 milliGRAM(s) Oral <User Schedule>  filgrastim-sndz (ZARXIO) Injectable 480 MICROGram(s) SubCutaneous once  insulin glargine Injectable (LANTUS) 6 Unit(s) SubCutaneous at bedtime  insulin lispro (HumaLOG) corrective regimen sliding scale   SubCutaneous Before meals and at bedtime  insulin lispro Injectable (HumaLOG) 6 Unit(s) SubCutaneous three times a day before meals  magnesium oxide 800 milliGRAM(s) Oral two times a day with meals  mycophenolate mofetil 500 milliGRAM(s) Oral two times a day  nystatin    Suspension 173056 Unit(s) Oral four times a day  nystatin Cream 1 Application(s) Topical two times a day  pantoprazole    Tablet 40 milliGRAM(s) Oral before breakfast  predniSONE   Tablet 5 milliGRAM(s) Oral daily  tamsulosin 0.8 milliGRAM(s) Oral at bedtime  trimethoprim   80 mG/sulfamethoxazole 400 mG 1 Tablet(s) Oral daily  valGANciclovir 900 milliGRAM(s) Oral daily    MEDICATIONS  (PRN):  artificial  tears Solution 1 Drop(s) Both EYES two times a day PRN Dry Eyes  diphenhydrAMINE 25 milliGRAM(s) Oral <User Schedule> PRN Insomnia      LABS:                        8.3    2.01  )-----------( 107      ( 11 Aug 2020 06:13 )             25.0     08-11    140  |  105  |  30<H>  ----------------------------<  198<H>  4.4   |  24  |  0.96    Ca    9.1      11 Aug 2020 06:13  Phos  3.0     08-11  Mg     1.8     08-11    TPro  5.4<L>  /  Alb  3.4  /  TBili  0.3  /  DBili  0.1  /  AST  18  /  ALT  25  /  AlkPhos  109  08-11    PT/INR - ( 10 Aug 2020 06:04 )   PT: 12.5 sec;   INR: 1.05 ratio         PTT - ( 10 Aug 2020 06:04 )  PTT:29.6 sec          MICROBIOLOGY:     RADIOLOGY:  [ ] Reviewed and interpreted by me    Point of Care Ultrasound Findings:    PFT:    EKG:    HSAT: Severe sleep apnea with 32 apnea events per hour

## 2023-05-28 NOTE — PROGRESS NOTE ADULT - SUBJECTIVE AND OBJECTIVE BOX
INTERVAL HPI/OVERNIGHT EVENTS:    SUBJECTIVE: Patient seen and examined at bedside.    OBJECTIVE:    VITAL SIGNS:  ICU Vital Signs Last 24 Hrs  T(C): 36.9 (2023 23:25), Max: 37.7 (2023 08:33)  T(F): 98.4 (2023 23:25), Max: 99.9 (2023 08:33)  HR: 74 (2023 23:25) (74 - 96)  BP: 124/66 (2023 23:25) (108/63 - 130/62)  BP(mean): --  ABP: --  ABP(mean): --  RR: 18 (2023 23:25) (18 - 18)  SpO2: 95% (2023 23:25) (92% - 96%)    O2 Parameters below as of 2023 23:25  Patient On (Oxygen Delivery Method): room air               @ 07:01  -   @ 07:00  --------------------------------------------------------  IN: 0 mL / OUT: 1300 mL / NET: -1300 mL      CAPILLARY BLOOD GLUCOSE      POCT Blood Glucose.: 275 mg/dL (2023 21:23)      PHYSICAL EXAM:    General: NAD  HEENT: NC/AT; PERRL, clear conjunctiva  Neck: supple  Respiratory: CTA b/l  Cardiovascular: +S1/S2; RRR  Abdomen: soft, NT/ND; +BS x4  Extremities:  no LE edema  Vascular: WWP, 2+ peripheral pulses b/l;  Skin: normal color and turgor; no rash  Neurological: A&Ox3, move all extremities. CN II-XII intact    MEDICATIONS:  MEDICATIONS  (STANDING):  Dakins Solution - 1/4 Strength 1 Application(s) Topical daily  dextrose 5%. 1000 milliLiter(s) (100 mL/Hr) IV Continuous <Continuous>  dextrose 5%. 1000 milliLiter(s) (50 mL/Hr) IV Continuous <Continuous>  dextrose 50% Injectable 25 Gram(s) IV Push once  dextrose 50% Injectable 12.5 Gram(s) IV Push once  dextrose 50% Injectable 25 Gram(s) IV Push once  folic acid 1 milliGRAM(s) Oral daily  glucagon  Injectable 1 milliGRAM(s) IntraMuscular once  heparin   Injectable 5000 Unit(s) SubCutaneous every 8 hours  insulin glargine Injectable (LANTUS) 14 Unit(s) SubCutaneous at bedtime  insulin lispro (ADMELOG) corrective regimen sliding scale   SubCutaneous three times a day before meals  insulin lispro (ADMELOG) corrective regimen sliding scale   SubCutaneous at bedtime  insulin lispro Injectable (ADMELOG) 1 Unit(s) SubCutaneous three times a day before meals  lidocaine   4% Patch 1 Patch Transdermal daily  mycophenolate mofetil 500 milliGRAM(s) Oral two times a day  pantoprazole    Tablet 40 milliGRAM(s) Oral before breakfast  piperacillin/tazobactam IVPB.. 3.375 Gram(s) IV Intermittent every 8 hours  predniSONE   Tablet 20 milliGRAM(s) Oral daily  valGANciclovir 450 milliGRAM(s) Oral every 12 hours    MEDICATIONS  (PRN):  acetaminophen     Tablet .. 650 milliGRAM(s) Oral every 6 hours PRN Temp greater or equal to 38C (100.4F), Mild Pain (1 - 3)  aluminum hydroxide/magnesium hydroxide/simethicone Suspension 30 milliLiter(s) Oral every 4 hours PRN Dyspepsia  dextrose Oral Gel 15 Gram(s) Oral once PRN Blood Glucose LESS THAN 70 milliGRAM(s)/deciliter  melatonin 3 milliGRAM(s) Oral at bedtime PRN Insomnia  ondansetron Injectable 4 milliGRAM(s) IV Push every 8 hours PRN Nausea and/or Vomiting  senna 2 Tablet(s) Oral at bedtime PRN Constipation      ALLERGIES:  Allergies    codeine (Anaphylaxis)    Intolerances        LABS:                        8.9    7.42  )-----------( 363      ( 2023 06:59 )             28.6     01-24    134<L>  |  102  |  45<H>  ----------------------------<  218<H>  3.6   |  19<L>  |  2.32<H>    Ca    9.1      2023 06:59  Phos  3.0     -24  Mg     1.8     -    TPro  7.0  /  Alb  2.7<L>  /  TBili  0.2  /  DBili  x   /  AST  20  /  ALT  31  /  AlkPhos  178<H>      PT/INR - ( 2023 06:59 )   PT: 14.0 sec;   INR: 1.21 ratio         PTT - ( 2023 17:10 )  PTT:25.1 sec  Urinalysis Basic - ( 2023 21:12 )    Color: Light Orange / Appearance: Turbid / S.022 / pH: x  Gluc: x / Ketone: Negative  / Bili: Negative / Urobili: Negative   Blood: x / Protein: 300 mg/dL / Nitrite: Negative   Leuk Esterase: Large / RBC: >50 /hpf / WBC >50 /HPF   Sq Epi: x / Non Sq Epi: 1 / Bacteria: Negative        RADIOLOGY & ADDITIONAL TESTS: Reviewed. INTERVAL HPI/OVERNIGHT EVENTS: MARS OVN.     SUBJECTIVE: Patient seen and examined at bedside. Patient states he is doing well with no fevers, chills, confusion, diarrhea, or lower extremity pain. Reports persistent left hip pain.     OBJECTIVE:    VITAL SIGNS:  ICU Vital Signs Last 24 Hrs  T(C): 36.9 (2023 23:25), Max: 37.7 (2023 08:33)  T(F): 98.4 (2023 23:25), Max: 99.9 (2023 08:33)  HR: 74 (2023 23:25) (74 - 96)  BP: 124/66 (2023 23:25) (108/63 - 130/62)  BP(mean): --  ABP: --  ABP(mean): --  RR: 18 (2023 23:25) (18 - 18)  SpO2: 95% (2023 23:25) (92% - 96%)    O2 Parameters below as of 2023 23:25  Patient On (Oxygen Delivery Method): room air               @ 07:01  -   @ 07:00  --------------------------------------------------------  IN: 0 mL / OUT: 1300 mL / NET: -1300 mL      CAPILLARY BLOOD GLUCOSE      POCT Blood Glucose.: 275 mg/dL (2023 21:23)      PHYSICAL EXAM:    General: NAD  HEENT: NC/AT; PERRL, clear conjunctiva  Neck: supple  Respiratory: CTA b/l  Cardiovascular: +S1/S2; RRR  Abdomen: soft, NT/ND; +BS x4  Extremities: 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 Left foot with poor peripheral pulses. No sensation in bilateral feet.   Skin: redness and warmth around second toe stump on right foot   Neurological: A&Ox3, move all extremities.     MEDICATIONS:  MEDICATIONS  (STANDING):  Dakins Solution - 1/4 Strength 1 Application(s) Topical daily  dextrose 5%. 1000 milliLiter(s) (100 mL/Hr) IV Continuous <Continuous>  dextrose 5%. 1000 milliLiter(s) (50 mL/Hr) IV Continuous <Continuous>  dextrose 50% Injectable 25 Gram(s) IV Push once  dextrose 50% Injectable 12.5 Gram(s) IV Push once  dextrose 50% Injectable 25 Gram(s) IV Push once  folic acid 1 milliGRAM(s) Oral daily  glucagon  Injectable 1 milliGRAM(s) IntraMuscular once  heparin   Injectable 5000 Unit(s) SubCutaneous every 8 hours  insulin glargine Injectable (LANTUS) 14 Unit(s) SubCutaneous at bedtime  insulin lispro (ADMELOG) corrective regimen sliding scale   SubCutaneous three times a day before meals  insulin lispro (ADMELOG) corrective regimen sliding scale   SubCutaneous at bedtime  insulin lispro Injectable (ADMELOG) 1 Unit(s) SubCutaneous three times a day before meals  lidocaine   4% Patch 1 Patch Transdermal daily  mycophenolate mofetil 500 milliGRAM(s) Oral two times a day  pantoprazole    Tablet 40 milliGRAM(s) Oral before breakfast  piperacillin/tazobactam IVPB.. 3.375 Gram(s) IV Intermittent every 8 hours  predniSONE   Tablet 20 milliGRAM(s) Oral daily  valGANciclovir 450 milliGRAM(s) Oral every 12 hours    MEDICATIONS  (PRN):  acetaminophen     Tablet .. 650 milliGRAM(s) Oral every 6 hours PRN Temp greater or equal to 38C (100.4F), Mild Pain (1 - 3)  aluminum hydroxide/magnesium hydroxide/simethicone Suspension 30 milliLiter(s) Oral every 4 hours PRN Dyspepsia  dextrose Oral Gel 15 Gram(s) Oral once PRN Blood Glucose LESS THAN 70 milliGRAM(s)/deciliter  melatonin 3 milliGRAM(s) Oral at bedtime PRN Insomnia  ondansetron Injectable 4 milliGRAM(s) IV Push every 8 hours PRN Nausea and/or Vomiting  senna 2 Tablet(s) Oral at bedtime PRN Constipation      ALLERGIES:  Allergies    codeine (Anaphylaxis)    Intolerances        LABS:                        8.9    7.42  )-----------( 363      ( 2023 06:59 )             28.6     01-24    134<L>  |  102  |  45<H>  ----------------------------<  218<H>  3.6   |  19<L>  |  2.32<H>    Ca    9.1      2023 06:59  Phos  3.0       Mg     1.8         TPro  7.0  /  Alb  2.7<L>  /  TBili  0.2  /  DBili  x   /  AST  20  /  ALT  31  /  AlkPhos  178<H>      PT/INR - ( 2023 06:59 )   PT: 14.0 sec;   INR: 1.21 ratio         PTT - ( 2023 17:10 )  PTT:25.1 sec  Urinalysis Basic - ( 2023 21:12 )    Color: Light Orange / Appearance: Turbid / S.022 / pH: x  Gluc: x / Ketone: Negative  / Bili: Negative / Urobili: Negative   Blood: x / Protein: 300 mg/dL / Nitrite: Negative   Leuk Esterase: Large / RBC: >50 /hpf / WBC >50 /HPF   Sq Epi: x / Non Sq Epi: 1 / Bacteria: Negative        RADIOLOGY & ADDITIONAL TESTS: Reviewed. Fall

## 2023-06-11 NOTE — ED PROVIDER NOTE - NS ED ROS FT
GENERAL: No fever, no chills  	EYES: No change in vision  	HEENT: No trouble swallowing or speaking  	CARDIAC: No chest pain  	PULMONARY: No cough, no SOB  	GI: + abdominal pain, no nausea, no vomiting, no diarrhea, no constipation  	: No changes in urination  	SKIN: No rashes  	NEURO: No headache, no numbness  	MSK: No visible bony deformity   Otherwise as HPI or negative.

## 2023-06-11 NOTE — ED PROVIDER NOTE - OBJECTIVE STATEMENT
This is a 67 year old male with pmh of obesity, IDDM, HFpEF, JEAN cirrhosis s/p liver transplant presenting with RUQ, R mid abdominal/flank pain along with bilateral groin pain for a week. Denies any fever/chills, nausea or vomiting. Denies any trauma/falls. Has been taking Tylenol without much relief. Has been having difficulty ambulating due to pain. Denies any saddle anesthesia or bowel/bladder incontinence. + urinary frequency. This is a 67 year old male with pmh of obesity, IDDM, HFpEF, JEAN cirrhosis s/p liver transplant presenting with RUQ, R mid abdominal/flank pain, R hip pain along with bilateral groin pain for a week. Denies any fever/chills, nausea or vomiting. Denies any trauma/falls. Has been taking Tylenol without much relief. Has been having difficulty ambulating due to pain. Denies any saddle anesthesia or bowel/bladder incontinence. + urinary frequency.

## 2023-06-11 NOTE — ED ADULT NURSE NOTE - OBJECTIVE STATEMENT
67Y male, A&Ox4, pmh pf DM, Liver stranspant (2020), HTN, neuropathy. pt presents to the ED c/o right lower back, hip and leg pain x1 week. endorses pain worsening since last night. states pain has been constant. indorses increased trouble ambulating secondary to pain. denies recent trauma. denies f/n/v/d, headahce, vision changes, chest pain, sob, abd pain. and soft and non tender. skin warm, dry, color normal to race.

## 2023-06-11 NOTE — ED ADULT NURSE NOTE - NSFALLRISKINTERV_ED_ALL_ED
Assistance OOB with selected safe patient handling equipment if applicable/Assistance with ambulation/Communicate fall risk and risk factors to all staff, patient, and family/Monitor gait and stability/Provide visual cue: yellow wristband, yellow gown, etc/Reinforce activity limits and safety measures with patient and family/Call bell, personal items and telephone in reach/Instruct patient to call for assistance before getting out of bed/chair/stretcher/Non-slip footwear applied when patient is off stretcher/[place] to call system/Physically safe environment - no spills, clutter or unnecessary equipment/Purposeful Proactive Rounding/Room/bathroom lighting operational, light cord in reach

## 2023-06-11 NOTE — ED PROVIDER NOTE - CLINICAL SUMMARY MEDICAL DECISION MAKING FREE TEXT BOX
This is a 67 year old male with pmh of obesity, IDDM, HFpEF, JEAN cirrhosis s/p liver transplant presenting with RUQ, R mid abdominal/flank pain, R hip pain along with bilateral groin pain for a week. Afebrile. Otherwise normal vitals. Tender to palpation in RUQ, R flank, R hip, bilateral groin. R hip effusion on the ultrasound. No hydronephrosis on the R kidney. Will treat pain with fentanyl 25mcg. Will obtain CT abd/pelv w/ IV contrast to eval for R hip effusion and R sided abdominal tenderness. Will obtain labs. Dispo pending reassessment, labs, and imaging. DDX at this time includes but not limited to nephrolithiasis, intraabdominal infection, infectious etiology of the hip joint. This is a 67 year old male with pmh of obesity, IDDM, HFpEF, JEAN cirrhosis s/p liver transplant presenting with RUQ, R mid abdominal/flank pain, R hip pain along with bilateral groin pain for a week. Afebrile. Otherwise normal vitals. Tender to palpation in RUQ, R flank, R hip, bilateral groin. R hip effusion on the ultrasound. No hydronephrosis on the R kidney. Will treat pain with fentanyl 25mcg. Will obtain CT abd/pelv w/ IV contrast to eval for R hip effusion and R sided abdominal tenderness. Will obtain labs. Dispo pending reassessment, labs, and imaging. DDX at this time includes but not limited to nephrolithiasis, intraabdominal infection, infectious etiology of the hip joint.  Attending Elidia Islas: 66 yo male with multiple medical issues including h/o prior liver transplant presentig with right flank pain. afebrile initially however when rectal temp performed spt with fever. pocus pwerformed showing hydronephrosis on left. reviewed prior charts pt with h/o left ureteral stone in the past. no left sided abdominal pain. pt pan cultured. ct scan ordered to further evaluate showing left sided ureteral stone. unclear whjether that stone is cause of pt's pain. with fever urology consulted and pt given abx. pocus with right sided hip effusion. pt able to range his hip and erythema making septic joint less likley however pt is immunocompromised. low threshold to consult ortho if no other infection source found. pt pan cultured and given abx as does meet sirs criteria. will notify transplant as well. pt will need admission

## 2023-06-11 NOTE — ED PROVIDER NOTE - PROGRESS NOTE DETAILS
Attending Elidia Islas: pt febrile. pt with left sided kidney stone. will d/w urology. additionally pt with right hip effusion. no redness or warmth less likely septic joint. spoke with hepatology transplant service. They are aware of patient.

## 2023-06-11 NOTE — ED PROVIDER NOTE - PHYSICAL EXAMINATION
General: NAD  HEENT: NCAT.   Cardiac: RRR, 2+ radial pulses  Chest: CTA  Abdomen: soft, non-distended, + ttp RUQ, R flank, no rebound or guarding  Extremities: no peripheral edema, calf tenderness, or leg size discrepancies. R toes amputated. well healed scar. No erythema or tenderness. L foot normal. No erythema of the groin. +ttp to bilateral groin.   Back: No midline tenderness of lumbar spine. + R paraspinal tenderness. negative straight leg raise test.   Skin: no rashes  Neuro: AAOx3, motor and sensory grossly intact.   Psych: mood and affect appropriate General: NAD  HEENT: NCAT.   Cardiac: RRR, 2+ radial pulses  Chest: CTA  Abdomen: soft, non-distended, + ttp RUQ, R flank, no rebound or guarding  Extremities: no peripheral edema, calf tenderness, or leg size discrepancies. R toes amputated. well healed scar. No erythema or tenderness. L foot normal. No erythema of the groin. +ttp to bilateral groin.   Back: No midline tenderness of lumbar spine. + R paraspinal tenderness. negative straight leg raise test.   Skin: no rashes  Neuro: AAOx3, motor and sensory grossly intact.   Psych: mood and affect appropriate  Attending Elidia Islas: Gen: NAD, heent: atrauamtic, eomi, perrla, mmm, , neck; nttp, no nuchal rigidity, chest: nttp, no crepitus, cv: rrr,  lungs: ctab, abd: soft, ttp right flank area no guarding, ext: wwp,mild le edema, toes amputaed on right, skin: no rash, neuro: awake and alert, following commands, speech clear, sensation and strength intact, no focal deficits no midline spinalttp

## 2023-06-11 NOTE — ED PROVIDER NOTE - ATTENDING CONTRIBUTION TO CARE
Attending MD Elidia Islas:  I personally have seen and examined this patient.  Resident note reviewed and agree on plan of care and except where noted.  See HPI, PE, and MDM for details.

## 2023-06-11 NOTE — ED ADULT NURSE NOTE - ISOLATION TYPE:
Michelle Hodges is a 51 y.o. 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 On 3L NC, epi 0.06. H/H and plt down-trend today, plt running currently. Will add vaso 0.02 for low diastolic pressures, increased to 0.04. ECHO yesterday revealed adequate AV placement without AV or AS.     Neuro:  Sedation: None  Pain control: Scheduled tylenol, oxy, fentanyl    Resp:  Extubated  5L NC, wean as tolerated  Hx on home O2  Monitor CT outputs, to suction  CT output 1, R 189  CT output 2, L 287  CXR increased haziness compared to yesterday   Daily  CXR    CV:  Off levo, epi  Vaso 0.04   CI 2.6, CO 4.6, SvO2 20% this morning   ECHO 3/11 revealed adequate AV placement, no AI or AS  MAP 60-80    Heme/ID:  H/H down-trended 6.7 (7.5) / 20.8  Periop Ancef completed   HIT panel send out serotonin assay yesterday   Platelets overnight     Renal:  Jiménez in place  UOP 1000, net +2.46   UOP overnight >40cc  Strict I/Os  BUN Cr, today 15/0.8      FEN/GI:  Cardiac diet   Passed swallow study, PO meds  Replace lytes PRN    Endo:  Accuchecks  BG goal <180    PPX:  Protonix  Sub Q Hep    Dispo: Continue ICU care     None

## 2023-06-12 PROBLEM — Z76.82 PRE-LIVER TRANSPLANT, LISTED: Status: RESOLVED | Noted: 2019-12-26 | Resolved: 2023-01-01

## 2023-06-12 PROBLEM — Z87.19 HISTORY OF DUODENAL ULCER: Status: RESOLVED | Noted: 2019-04-22 | Resolved: 2023-01-01

## 2023-06-12 PROBLEM — Z87.898 HISTORY OF NAUSEA AND VOMITING: Status: RESOLVED | Noted: 2020-06-10 | Resolved: 2023-01-01

## 2023-06-12 PROBLEM — U07.1 COVID-19: Status: RESOLVED | Noted: 2022-01-23 | Resolved: 2023-01-01

## 2023-06-12 PROBLEM — B25.9 CYTOMEGALOVIRUS INFECTION, UNSPECIFIED CYTOMEGALOVIRAL INFECTION TYPE: Status: ACTIVE | Noted: 2022-03-10

## 2023-06-12 PROBLEM — L97.512 RIGHT FOOT ULCER, WITH FAT LAYER EXPOSED: Status: RESOLVED | Noted: 2020-12-08 | Resolved: 2023-01-01

## 2023-06-12 PROBLEM — L89.612 DECUBITUS ULCER OF RIGHT HEEL, STAGE 2: Status: RESOLVED | Noted: 2021-01-19 | Resolved: 2023-01-01

## 2023-06-12 PROBLEM — K76.82 ENCEPHALOPATHY, HEPATIC: Status: RESOLVED | Noted: 2019-04-22 | Resolved: 2023-01-01

## 2023-06-12 PROBLEM — I95.1 CHRONIC ORTHOSTATIC HYPOTENSION: Status: RESOLVED | Noted: 2020-04-10 | Resolved: 2023-01-01

## 2023-06-12 PROBLEM — K75.81 LIVER CIRRHOSIS SECONDARY TO NASH: Status: RESOLVED | Noted: 2019-12-26 | Resolved: 2023-01-01

## 2023-06-12 PROBLEM — L97.519 FOOT ULCER, RIGHT: Status: RESOLVED | Noted: 2020-09-29 | Resolved: 2023-01-01

## 2023-06-12 PROBLEM — L03.115 CELLULITIS OF FOOT, RIGHT: Status: RESOLVED | Noted: 2022-01-01 | Resolved: 2023-01-01

## 2023-06-12 NOTE — PROGRESS NOTE ADULT - SUBJECTIVE AND OBJECTIVE BOX
DAILY PROGRESS NOTE:         24 hr events:  minimal right abdominal pain.     Objective:    Vital Signs Last 24 Hrs  T(C): 36.9 (2023 07:40), Max: 38.1 (2023 23:15)  T(F): 98.4 (2023 07:40), Max: 100.6 (2023 23:15)  HR: 80 (2023 07:40) (68 - 88)  BP: 125/72 (2023 07:40) (110/63 - 143/79)  BP(mean): --  RR: 18 (2023 07:40) (16 - 20)  SpO2: 97% (2023 07:40) (96% - 100%)    Parameters below as of 2023 07:40  Patient On (Oxygen Delivery Method): room air        I&O's Detail      Physical Exam:    General: NAD, well-nourished  Resp: Breathing comfortably on RA  CV: regular rate   : no cva ttp b/l       Laboratory Results:                          9.8    7.87  )-----------( 231      ( 2023 08:14 )             30.9     06-12    135  |  102  |  44<H>  ----------------------------<  158<H>  4.1   |  19<L>  |  1.90<H>    Ca    8.9      2023 08:14  Phos  2.9     06-12  Mg     1.8     06-12    TPro  6.9  /  Alb  3.3  /  TBili  0.4  /  DBili  x   /  AST  36  /  ALT  54<H>  /  AlkPhos  153<H>  06-12    PT/INR - ( 2023 18:05 )   PT: 13.9 sec;   INR: 1.20 ratio         PTT - ( 2023 18:05 )  PTT:28.2 sec  Urinalysis Basic - ( 2023 08:17 )    Color: Light Yellow / Appearance: Clear / S.046 / pH: x  Gluc: x / Ketone: Negative  / Bili: Negative / Urobili: Negative   Blood: x / Protein: 100 mg/dl / Nitrite: Negative   Leuk Esterase: Large / RBC: 67 /hpf /  /HPF   Sq Epi: x / Non Sq Epi: x / Bacteria: Negative

## 2023-06-12 NOTE — H&P ADULT - NSHPLABSRESULTS_GEN_ALL_CORE
( @ 18:05)                      11.2  7.64 )-----------( 254                 35.0    Neutrophils = 6.25 (81.9%)  Lymphocytes = 0.88 (11.5%)  Eosinophils = 0.01 (0.1%)  Basophils = 0.03 (0.4%)  Monocytes = 0.39 (5.1%)  Bands = --%        135  |  103  |  44<H>  ----------------------------<  166<H>  4.5   |  19<L>  |  1.82<H>    Ca    9.3      2023 23:03  Phos  3.2       Mg     1.8         TPro  7.3  /  Alb  3.3  /  TBili  0.4  /  DBili  x   /  AST  23  /  ALT  43  /  AlkPhos  128<H>      ( 2023 18:05 )   PT: 13.9 sec;   INR: 1.20 ratio;       PTT:28.2 sec  CARDIAC MARKERS ( 2023 23:03 )  Trop x     / CK 24 U/L<L> / CKMB x           RVP:    Venous Blood Gas:   @ 22:40  7.35/37/33/20/48.5  VBG Lactate: 1.0  Venous Blood Gas:   @ 18:05  7.33/42/19/22/26.4  VBG Lactate: 1.4        Tox:         Urinalysis Basic - ( 2023 19:52 )    Color: Yellow / Appearance: Slightly Turbid / S.021 / pH: x  Gluc: x / Ketone: Negative  / Bili: Negative / Urobili: Negative   Blood: x / Protein: 100 mg/dl / Nitrite: Negative   Leuk Esterase: Large / RBC: 148 /hpf /  /HPF   Sq Epi: x / Non Sq Epi: x / Bacteria: Negative

## 2023-06-12 NOTE — ED ADULT NURSE REASSESSMENT NOTE - NS ED NURSE REASSESS COMMENT FT1
Received report from JODY Delgadillo. Pt is awake and alert, resting comfortably in stretcher, speaking in full coherent sentences. Pt denies CP, SOB, fevers, chills, N/V/D, HA, vision changes. Urology MD at bedside.

## 2023-06-12 NOTE — H&P ADULT - ATTENDING COMMENTS
67y M pmh obesity, DM2, HFpEF, JEAN cirrhosis s/p liver transplant presenting with RUQ, R mid abdominal/flank pain, R hip pain found to be febrile, unclear etiology being admitted for further w/u    #Rt side flank pain   Rt hip pain   # fever  - Presents for right side pain that acutely worsened over past few days   - Noted to be febrile in ED otherwise VSS   - Labs notable for elevated Procal, CRP, and ESR but Lipase, LFTs, T-bili wnl   - CT A/P notes 6x3mm stone in left distal ureter with associated hydroureteronephrosis, delayed nephrogram (?obstructive uropathy), and mild perinephric stranding.   No abdominal imaging findings to explain Rt abm pain   -XR Rt hip: no fx or dislocation, mild rt hip joint arthrosis  - POCUS Kidney & bladder showing Rt hip effusion  - UA: infectious  - DDX: Intraabdominal vs  infection vs Septic joint   - In ED given:  Vanco 1gm, Ceftriaxone 1g, IVF, pain control   - This is an immunocompromised pt with multiple comorbidities at high risk of decompensation   - C/w Abx -- Ceftriaxone 1g, low suspicion for MRSA hold Vanco, check MRSA PCR   - F/u bcx, ucx, MRSA pcr   - Pain control   - Monitor fever curve, trend labs   - Transplant hepatology called by ED,  f/u in AM   - ID consult to be called in AM for abx management in this immunocompromised pt w/multiple comorbidities  - S/p Uro eval, apprec rec   - NPO for possible Urologic intervention, f/u Uro in AM       Rest per resident

## 2023-06-12 NOTE — H&P ADULT - PROBLEM SELECTOR PLAN 2
PCUS with R hip effusion but low concern of sepsis    - pending blood cultures POCUS with R hip effusion but low concern of sepsis    - pending blood cultures  - ID c/s this AM

## 2023-06-12 NOTE — H&P ADULT - NSHPPHYSICALEXAM_GEN_ALL_CORE
Vital Signs Last 24 Hrs  T(C): 37.9 (12 Jun 2023 05:41), Max: 38.1 (11 Jun 2023 23:15)  T(F): 100.2 (12 Jun 2023 05:41), Max: 100.6 (11 Jun 2023 23:15)  HR: 77 (12 Jun 2023 05:41) (68 - 88)  BP: 127/78 (12 Jun 2023 05:41) (110/63 - 143/79)  BP(mean): --  RR: 16 (12 Jun 2023 05:41) (16 - 20)  SpO2: 96% (12 Jun 2023 05:41) (96% - 100%)    Parameters below as of 12 Jun 2023 05:41  Patient On (Oxygen Delivery Method): room air        CONSTITUTIONAL: Uncomfortable, in painn   EYES: No conjunctival or scleral injection, non-icteric; PERRLA and symmetric  ENMT: No external nasal lesions; nasal mucosa not inflamed; normal dentition; no pharyngeal injection or exudates, oral mucosa with moist membranes  NECK: Trachea midline   RESPIRATORY: Breathing comfortably;  lungs CTA without wheeze/rhonchi/rales  CARDIOVASCULAR: +S1S2, RRR, no M/G/R;; no lower extremity edema  GASTROINTESTINAL: Large body habitus- nondistended; right sided tenderness; no RUQ tenderness; +BS throughout, no rebound/guarding;  MUSCULOSKELETAL: normal strength and tone of extremities; R hip with no signs of erythema or infection  SKIN: No rashes or ulcers noted;  NEUROLOGIC: No focal deficits noted  PSYCHIATRIC: A+O x 3 Vital Signs Last 24 Hrs  T(C): 37.9 (12 Jun 2023 05:41), Max: 38.1 (11 Jun 2023 23:15)  T(F): 100.2 (12 Jun 2023 05:41), Max: 100.6 (11 Jun 2023 23:15)  HR: 77 (12 Jun 2023 05:41) (68 - 88)  BP: 127/78 (12 Jun 2023 05:41) (110/63 - 143/79)  BP(mean): --  RR: 16 (12 Jun 2023 05:41) (16 - 20)  SpO2: 96% (12 Jun 2023 05:41) (96% - 100%)    Parameters below as of 12 Jun 2023 05:41  Patient On (Oxygen Delivery Method): room air        CONSTITUTIONAL: Uncomfortable, in pain  EYES: No conjunctival or scleral injection, non-icteric; PERRLA and symmetric  ENMT: No external nasal lesions; nasal mucosa not inflamed; normal dentition; no pharyngeal injection or exudates, oral mucosa with moist membranes  NECK: Trachea midline   RESPIRATORY: Breathing comfortably;  lungs CTA without wheeze/rhonchi/rales  CARDIOVASCULAR: +S1S2, RRR, no M/G/R;; no lower extremity edema  GASTROINTESTINAL: Large body habitus- nondistended; right sided tenderness; no RUQ tenderness; +BS throughout, no rebound/guarding;  MUSCULOSKELETAL: normal strength and tone of extremities; R hip with no signs of erythema or infection  SKIN: No rashes or ulcers noted;  NEUROLOGIC: No focal deficits noted  PSYCHIATRIC: A+O x 3

## 2023-06-12 NOTE — H&P ADULT - HISTORY OF PRESENT ILLNESS
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 Pt states this pain began ~2 days ago, Denies recent trauma, travel, infections, or sick contacts. He denies N/F/V/C, CP, SOB, diarrhea, constipation, melena/ hematochezia, dysuria, and hematuria. State the R back/ flank pain moves around to the right side of the abdomen. The hip pain does not move to groin or down the extremity. States both these pains worsen with movement. Reports fatigue few weeks prior to onset of symptoms. Due to pain, he has addison unable to get out of bed. States pain mildly improves with Tylenol. Pt has not had any recent medication changes.     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 Pt states this pain began ~2 days ago, Denies recent trauma, travel, infections, or sick contacts. He denies N/F/V/C, CP, SOB, diarrhea, constipation, melena/ hematochezia, dysuria, and hematuria. State the R back/ flank pain moves around to the right side of the abdomen. The hip pain does not move to groin or down the extremity. States both these pains worsen with movement. Reports fatigue few weeks prior to onset of symptoms. Due to pain, he has addison unable to get out of bed. States pain mildly improves with Tylenol. Pt has not had any recent medication changes.     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 Pt states this pain began ~2 days ago, Denies recent trauma, travel, infections, or sick contacts. He denies N/F/V/C, CP, SOB, diarrhea, constipation, melena/ hematochezia, dysuria, and hematuria. State the R back/ flank pain moves around to the right side of the abdomen. The hip pain does not move to groin or down the extremity. States both these pains worsen with movement. Pt also reports L neck pain moving to shoulder; states it has been difficult to rotate head. Reports fatigue few weeks prior to onset of symptoms. Due to pain, he has addison unable to get out of bed. States pain mildly improves with Tylenol. Pt has not had any recent medication changes.     In the ED- Tmax 100,2, SBP 130s, HR 70- 80  Labs- WBC 7.64, Hgb 11.2, Plt 254, BUN 44, Cr 1.82, VBG- 7.35/37/33/20 lactate 1.0, UA + LE +  [id number]  Imaging  -CTAP 6 x 3 mm stone seen within the left distal ureter with L hydronephrosis; asymm urinary bladder thickening; L>R perinephric stranding; possible distal esophagitis; trace pelvic fluid with right pelvic surgical clip  -ED POCUS: right hip effusion  Meds- CTX (11pm), Vanc (1am), Fentanly 25, NS bolus 250

## 2023-06-12 NOTE — PROGRESS NOTE ADULT - ATTENDING COMMENTS
66 y/o M with a history notable for NSAH s/p OLT 7/2020 (on MMF + pred), multiple prior infections including MDRO UTIs, bacteremia, CMV viremia presenting with F and R sided back pain found to have L sided ureteral stone.  #Fever  #s/p OLT  #MISA on CKD  #CMV  -h/o multiple infections, although UA positive unclear if source. ID following. Vanc ertapenem given previous infectious organisms.  -f/u CMV pcr, may need to increase valcyte dose  -MRI spine given unclear origin and physical exam does not correlate with current findings  -Not on CNI given neurotoxicity, mTOR on hold for wound healing, may need to restart outpatient  -Continue MMF + pred  -Transplant hepatology following

## 2023-06-12 NOTE — CONSULT NOTE ADULT - ATTENDING COMMENTS
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 He also has a history of multiple prior infections including VRE bacteremia (7/2020<  IN PROGRESS    ear old male with history of HTN, HLD, obesity, T2DM, JEAN cirrhosis s/p OLT 7/2/2020 [currently on  BID and prednisone 10mg daily; intolerant of CNIs due to neurotoxicity; intolerant of everolimus due to prior workup of skin grafts and osteomyelitis], still on Valcyte due to CMV PCR detected 1/18/23, prior VRE bacteremia, s/p multiple toe amputations 9/2022 2/2 infections, osteomyelitis s/p right TMA 1/25/2023, HFpEF, mild LV dysfunction, MGUS, chronic anemia, SHENG, CKD, h/o ureteral stone who now  presents to the ED with back pain and right flank pain. 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 requiring early transition to everolimus (since 7/27/20) as well as multiple prior infections including episodes of MDR UTIs and bacteremia, with recent immunosuppression with prednisone and MMF only with mTOR inhibitor discontinued to enable wound healing after right foot partial amputation and skin graft, also with recent recurrent low level CMV viremia (with  IU/mL on 4/17/23), admitted with fever to 100.6 in the ER this morning, R flank pain, and CT evidence of a 6 x 3 mm left ureteral stone with associated left hydronephrosis, asymmetric bladder thickening, and left > right perinephric stranding. Labs without leukocytosis or leukopenia, Cr 1.9, and mild (<2x ULN) elevations in ALP and ALT. 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 requiring early transition to everolimus (since 7/27/20) as well as multiple prior infections including episodes of MDR UTIs and bacteremia, with recent immunosuppression with prednisone and MMF only with mTOR inhibitor discontinued to enable wound healing after right foot partial amputation and skin graft, also with recent recurrent low level CMV viremia (with  IU/mL on 4/17/23), admitted with fever to 100.6 in the ER this morning, R flank pain, and CT evidence of a 6 x 3 mm left ureteral stone with associated left hydronephrosis, asymmetric bladder thickening, and left > right perinephric stranding.    Labs with mild MISA on CKD and recommend checking urine lytes and considering IV hydration if not improving with antibiotics for UTI. Urology also following re: whether he will need decompression of the left hydronephrosis. Appreciate Transplant ID input re: antibiotic management for his UTI and further evaluation of his fevers. Labs also with mild (<2x ULN) elevations in ALP and ALT, most likely reactive rather than indicative of allograft rejection. Please send CMV PCR with next AM labs as he may also need dose adjustment of his Valcyte if still viremic. Continue home immunosuppression (prednisone 10 mg po daily and  mg po bid) for now, though now that RLE skin graft has healed tentative plan will be to re-start mTOR inhibitor (everolimus) likely as an outpatient to enable gradual tapering off prednisone.    Please don't hesitate to call with any questions or concerns.    Letitia Mo M.D., Ph.D.  Transplant Hepatology

## 2023-06-12 NOTE — ED ADULT NURSE REASSESSMENT NOTE - NS ED NURSE REASSESS COMMENT FT1
Admitting team contacted. Pt temperature 100.2 and complaining of worsening back pain. MD made aware, awaiting interventions.

## 2023-06-12 NOTE — H&P ADULT - PROBLEM SELECTOR PLAN 5
Home medication: Ezetimibe 10mg, Lovaza 1g, Prednisone 1-mg daily, Cellcept 500 BID, Valcyte 450 QD, Protonix 40      - F/u transplant team this

## 2023-06-12 NOTE — H&P ADULT - SKIN/BREAST
well developed, well nourished , in no acute distress , ambulating with cane , normal communication ability 
negative

## 2023-06-12 NOTE — CONSULT NOTE ADULT - ASSESSMENT
67y Male pmh of obesity, IDDM, HFpEF, JEAN cirrhosis s/p liver transplant, nephrolithiasis x 1, passed organically years ago, presenting with RLQ, R mid abdominal/flank pain, R hip/back pain for the past week.   CT abd pelvis demonstrated stable appearing 6mm L distal stone. Labs showed wbc 7.64, Cr 1.81.  -no acute urologic intervention warranted emergently overnight  -low suspicion that L sided stone is etiology for right sided pain and rectal temp  -recommend medical expulsive therapy  -IVF  -flomax  -f/u cultures  -trend creatinine  -continue abx  -please keep patient npo overnight; if patient clinically worsens, may warrant procedure  -trend temp  -recommend medical admission for remainder of management  -d/w Dr. Zhu and Dr. Coto 67y Male pmh of obesity, IDDM, HFpEF, JEAN cirrhosis s/p liver transplant, nephrolithiasis x 1, passed organically years ago, presenting with RLQ, R mid abdominal/flank pain, R hip/back pain for the past week.   CT abd pelvis demonstrated stable appearing 6mm L distal stone. Labs showed wbc 7.64, Cr 1.81.  -no acute urologic intervention warranted emergently overnight  -low suspicion that L sided stone is etiology for right sided pain and rectal temp  -recommend medical expulsive therapy  -IVF  -f/u temp  -flomax  -f/u cultures  -trend creatinine  -continue abx  -please keep patient npo overnight; if patient clinically worsens, may warrant procedure  -recommend medical admission for remainder of management  -d/w Dr. Zhu and Dr. Coto

## 2023-06-12 NOTE — CONSULT NOTE ADULT - ASSESSMENT
67 year old male with history of HTN, HLD, obesity, T2DM, JEAN cirrhosis s/p OLT 7/2/2020 [currently on  BID and prednisone 10mg daily; intolerant of CNIs due to neurotoxicity; intolerant of everolimus due to prior workup of skin grafts and osteomyelitis], still on Valcyte due to CMV PCR detected 1/18/23, prior VRE bacteremia, s/p multiple toe amputations 9/2022 2/2 infections, osteomyelitis s/p right TMA 1/25/2023, HFpEF, mild LV dysfunction, MGUS, chronic anemia, SHENG, CKD, h/o ureteral stone who now  presents to the ED with back pain and right flank pain.  Patient presents with back pain radiating to his right flank, which has been progressively worsening over the past several days.  Upon admission, he states he is "unaware of his medications," stating his son puts them in a pill box and then "he takes them."  Most recent hepatology clinic note 4/20/2023 describes current regimen of MMF 500mg BID and prednisone 10mg daily.    # Fever  # Ureteral stone with associated hydronephrosis  # Mildly elevated liver chemistries: most likely related to acute infection  # JEAN cirrhosis s/p LT 7/2/2023 [currently on  BID and prednisone 10mg daily; intolerant of CNIs due to neurotoxicity; intolerant of everolimus due to prior workup of skin grafts and osteomyelitis]    Recommendations:  -trend clinical symptoms, exam findings, vital signs, CBC, CMP, INR  -panculture and complete infectious workup  -recommend consultation with Transplant ID  -continue home immunosuppression regimen MMF 500mg BID and prednisone 10mg daily  -if skin grafts completed and without foreseeable issues with wound healing, will consider re-initiation of everolimus in the future after acute illness resolves    Note incomplete until finalized by attending signature/attestation.    Gallo Rodriguez  GI/Hepatology Fellow    MONDAY-FRIDAY 8AM-5PM:  Pager# [phone] ([facility]) or [phone] ([facility])    NON-URGENT CONSULTS:  Please email [email or web address]  AT NIGHT AND ON WEEKENDS:  Contact on-call GI fellow via answering service ([phone]) from 5pm-8am and on weekends/holidays   67 year old male with history of HTN, HLD, obesity, T2DM, JEAN cirrhosis s/p OLT 7/2/2020 [currently on  BID and prednisone 10mg daily; intolerant of CNIs due to neurotoxicity; intolerant of everolimus due to prior workup of skin grafts and osteomyelitis], still on Valcyte due to CMV PCR detected 1/18/23, prior VRE bacteremia, s/p multiple toe amputations 9/2022 2/2 infections, osteomyelitis s/p right TMA 1/25/2023, HFpEF, mild LV dysfunction, MGUS, chronic anemia, SHENG, CKD, h/o ureteral stone who now  presents to the ED with back pain and right flank pain.  Patient presents with back pain radiating to his right flank, which has been progressively worsening over the past several days.  Upon admission, he states he is "unaware of his medications," stating his son puts them in a pill box and then "he takes them."  Most recent hepatology clinic note 4/20/2023 describes current regimen of MMF 500mg BID and prednisone 10mg daily.    # Fever  # Ureteral stone with associated hydronephrosis  # Mildly elevated liver chemistries: most likely related to acute infection  # JEAN cirrhosis s/p LT 7/2/2023 [currently on  BID and prednisone 10mg daily; intolerant of CNIs due to neurotoxicity; intolerant of everolimus due to prior workup of skin grafts and osteomyelitis]  # Recent CMV viremia    Recommendations:  -trend clinical symptoms, exam findings, vital signs, CBC, CMP, INR  -check CMV PCR with AM labs and if still viremic anticipate will need to increase Valcyte to treatment (rather than prophylactic) dosing  -panculture and complete infectious workup  -recommend consultation with Transplant ID  -follow-up Urology recommendations  -continue home immunosuppression regimen MMF 500mg BID and prednisone 10mg daily  -if skin grafts completed and without foreseeable issues with wound healing, will consider re-initiation of everolimus in the future after acute illness resolves    Note incomplete until finalized by attending signature/attestation.    Gallo Rodriguez  GI/Hepatology Fellow    MONDAY-FRIDAY 8AM-5PM:  Pager# [phone] ([facility]) or [phone] ([facility])    NON-URGENT CONSULTS:  Please email [email or web address]  AT NIGHT AND ON WEEKENDS:  Contact on-call GI fellow via answering service ([phone]) from 5pm-8am and on weekends/holidays

## 2023-06-12 NOTE — PROGRESS NOTE ADULT - ASSESSMENT
67y Male pmh of obesity, IDDM, HFpEF, JEAN cirrhosis s/p liver transplant, nephrolithiasis x 1, passed organically years ago, presenting with RLQ, R mid abdominal/flank pain, R hip/back pain for the past week.   CT abd pelvis demonstrated stable appearing 6mm L distal stone. Labs showed wbc 7.64, Cr 1.81.  -no acute urologic intervention warranted emergently overnight w/o any fevers.   -low suspicion that L sided stone is etiology for right sided pain and rectal temp  -recommend medical expulsive therapy  -IVF  -f/u temp  -flomax  -f/u cultures  - can fee patient  -- strain urine for stone

## 2023-06-12 NOTE — PROGRESS NOTE ADULT - PROBLEM SELECTOR PLAN 1
Presents with R flank pain radiating to the right side of the abdomen; UA + LE and +WBC  CTAP - 6 x 3 mm stone seen within the left distal ureter with L hydronephrosis; asymm urinary bladder thickening; L>R perinephric stranding;    - urology c/s however less likely this may be the cause of the pain; per urology will keep NPO today for possible need of an intervention  - pending blood and urine cultures  - continue with Ceftriaxone given + UA findings   - hold off vanc; will order MRSA swab  - for pain Morphine 5 (moderate)/ 10 (severe), Dilaudid 0.5 for severe breakthrough pain  - ID c/s this am Presents with R flank pain radiating to the right side of the abdomen; UA + LE and +WBC  CTAP - 6 x 3 mm stone seen within the left distal ureter with L hydronephrosis; asymm urinary bladder thickening; L>R perinephric stranding;    - urology c/s however less likely this may be the cause of the pain; per urology will keep NPO today for possible need of an intervention  - pending blood and urine cultures  - continue with Ceftriaxone given + UA findings   - hold off vanc; will order MRSA swab  - for pain Morphine 5 (moderate)/ 10 (severe), Dilaudid 0.5 for severe breakthrough pain  - ID c/s this am  - MRI spine to evaluate for discitis.

## 2023-06-12 NOTE — PROGRESS NOTE ADULT - SUBJECTIVE AND OBJECTIVE BOX
PROGRESS NOTE:   Authored by Morgan Fairchild MD   Patient is a 67y old  Male who presents with a chief complaint of back pain (2023 05:38)      SUBJECTIVE / OVERNIGHT EVENTS:  No acute events overnight.     Reporting persistent back pain, rt hip pain has improved. No saddle anesthesia, urinary or fecal incontinence.     Marcus Mark updated at bedside over phone     ADDITIONAL REVIEW OF SYSTEMS:    MEDICATIONS  (STANDING):  cefTRIAXone   IVPB 1000 milliGRAM(s) IV Intermittent every 24 hours  dextrose 50% Injectable 25 Gram(s) IV Push once  dextrose 50% Injectable 25 Gram(s) IV Push once  dextrose 50% Injectable 12.5 Gram(s) IV Push once  dextrose Oral Gel 15 Gram(s) Oral once  folic acid 1 milliGRAM(s) Oral daily  glucagon  Injectable 1 milliGRAM(s) IntraMuscular once  heparin   Injectable 5000 Unit(s) SubCutaneous every 8 hours  insulin glargine Injectable (LANTUS) 10 Unit(s) SubCutaneous at bedtime  insulin lispro (ADMELOG) corrective regimen sliding scale   SubCutaneous every 6 hours  mycophenolate mofetil 500 milliGRAM(s) Oral two times a day  pantoprazole    Tablet 40 milliGRAM(s) Oral before breakfast  predniSONE   Tablet 10 milliGRAM(s) Oral daily  sertraline 100 milliGRAM(s) Oral daily  tamsulosin 0.4 milliGRAM(s) Oral at bedtime  valGANciclovir 450 milliGRAM(s) Oral daily    MEDICATIONS  (PRN):      CAPILLARY BLOOD GLUCOSE      POCT Blood Glucose.: 153 mg/dL (2023 06:27)    I&O's Summary      PHYSICAL EXAM:  Vital Signs Last 24 Hrs  T(C): 36.4 (2023 09:12), Max: 38.1 (2023 23:15)  T(F): 97.6 (2023 09:12), Max: 100.6 (2023 23:15)  HR: 72 (2023 09:12) (68 - 88)  BP: 135/85 (2023 09:12) (110/63 - 143/79)  BP(mean): --  RR: 16 (2023 09:12) (16 - 20)  SpO2: 96% (2023 09:12) (96% - 100%)    Parameters below as of 2023 09:12  Patient On (Oxygen Delivery Method): room air        GENERAL: No apparent distress.   HEAD:  Atraumatic, Normocephalic  EYES: EOMI, PERRLA, conjunctiva and sclera clear  NECK: Supple, no lymphadenopathy, no elevated JVP  CHEST/LUNG: Clear to auscultation bilateral and symmetric; No wheezes, rales, or rhonchi  HEART: S1 and S2 normal. Regular rate and rhythm; No murmurs, rubs, or gallops  ABDOMEN: Soft, non-tender, non-distended; normal bowel sounds  EXTREMITIES:  2+ peripheral pulses b/l, No clubbing, cyanosis, or edema  NEUROLOGY: A&O x 3, RLE 3/5, LLE 4/5, CN intact.   SKIN: No rashes or lesions    LABS:                        9.8    7.87  )-----------( 231      ( 2023 08:14 )             30.9     06-12    135  |  102  |  44<H>  ----------------------------<  158<H>  4.1   |  19<L>  |  1.90<H>    Ca    8.9      2023 08:14  Phos  2.9     06-12  Mg     1.8     -12    TPro  6.9  /  Alb  3.3  /  TBili  0.4  /  DBili  x   /  AST  36  /  ALT  54<H>  /  AlkPhos  153<H>  06-12    PT/INR - ( 2023 18:05 )   PT: 13.9 sec;   INR: 1.20 ratio         PTT - ( 2023 18:05 )  PTT:28.2 sec  CARDIAC MARKERS ( 2023 23:03 )  x     / x     / 24 U/L / x     / x          Urinalysis Basic - ( 2023 08:17 )    Color: Light Yellow / Appearance: Clear / S.046 / pH: x  Gluc: x / Ketone: Negative  / Bili: Negative / Urobili: Negative   Blood: x / Protein: 100 mg/dl / Nitrite: Negative   Leuk Esterase: Large / RBC: 67 /hpf /  /HPF   Sq Epi: x / Non Sq Epi: x / Bacteria: Negative          RADIOLOGY & ADDITIONAL TESTS:  Lab Results Reviewed   Imaging Reviewed  Electrocardiogram Reviewed

## 2023-06-12 NOTE — CONSULT NOTE ADULT - ASSESSMENT
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 Pt states this pain began ~2 days ago, Denies recent trauma, travel, infections, or sick contacts. He denies N/F/V/C, CP, SOB, diarrhea, constipation, melena/ hematochezia, dysuria, and hematuria. State the R back/ flank pain moves around to the right side of the abdomen. The hip pain does not move to groin or down the extremity. States both these pains worsen with movement. Pt also reports L neck pain moving to shoulder; states it has been difficult to rotate head. Reports fatigue few weeks prior to onset of symptoms. Due to pain, he has addison unable to get out of bed. States pain mildly improves with Tylenol. Pt has not had any recent medication changes.     In the ED- Tmax 100,2, SBP 130s, HR 70- 80  Labs- WBC 7.64, Hgb 11.2, Plt 254, BUN 44, Cr 1.82, VBG- 7.35/37/33/20 lactate 1.0, UA + LE +  [id number]  Imaging  -CTAP 6 x 3 mm stone seen within the left distal ureter with L hydronephrosis; asymm urinary bladder thickening; L>R perinephric stranding; possible distal esophagitis; trace pelvic fluid with right pelvic surgical clip  -ED POCUS: right hip effusion  Meds- CTX (11pm), Vanc (1am), Fentanly 25, NS bolus 250 (12 Jun 2023 05:38)      #hydronephrosis, back pain, Right hip effusion  --UA (6/11, 6/12) - Large leukocyte esterase, 279 WBC, no bacteria or nitrite   --Afebrile - curve temperatures   --No leukocytosis - trend CBC  --MRSA/MSSA Nares - negative  --CT A/P (6/11) - 6X3 mm stone within left ureter  with left hydronephrosis    Recommendations:  --Switch ceftriaxone to Vancomycin 1250 mg Q24 hr, check trough pre 3rd, and Ertapenem 1 gm Q24 hr. Considering foot abscess from 01/22/23 was positive for Klebsiella PNA, bacteroides thetaiotamicron actinomyces odontolyticus strep mitis/oralis and strep agalactiae.   --Send RVP panel  --Follow MRI of Thoracic, cervical and lumbar spine (Hip effusion)  --Follow urine culture  --Follow blood cultures    Case discussed with Dr. Menezes

## 2023-06-12 NOTE — CONSULT NOTE ADULT - SUBJECTIVE AND OBJECTIVE BOX
HPI:  Patient is a 67y Male pmh of obesity, IDDM, HFpEF, JEAN cirrhosis s/p liver transplant, nephrolithiasis x 1, passed organically years ago, presenting with RLQ, R mid abdominal/flank pain, R hip/back pain for the past week. Patient states it became excruciating yesterday, which prompted him to come in. He also states he has been more fatigued over the past ~2 weeks. He otherwise denies fevers at home, chills, nausea, vomiting, dysuria, hematuria, flank pain, frequency.   In the ED, patient found to have a rectal temp of 100.5F, not currently documented, but relayed by ED. Other vitals stable.  CT abd pelvis showed 6mm L distal stone with mild hydro, that is stable and chronic appearing compared to a CT scan from .      PAST MEDICAL & SURGICAL HISTORY:  Diabetes      Hypercholesteremia      Neuropathy      Hypertension      Renal stones  25 years ago      Fatty liver disease, nonalcoholic      Obesity      Hepatic encephalopathy      GERD with esophagitis  Gastritis & Non Bleeding Ulcers      GIB (gastrointestinal bleeding)      S/P cholecystectomy        FAMILY HISTORY:  Family history of stomach cancer    Family history of hypertension    Family history of type 2 diabetes mellitus      SOCIAL HISTORY:   Tobacco hx:  MEDICATIONS  (STANDING):    MEDICATIONS  (PRN):    Allergies    codeine (Anaphylaxis)    Intolerances        REVIEW OF SYSTEMS: Pertinent positives and negatives as stated in HPI, otherwise negative    Vital signs  T(C): 36.7 (23 @ 19:30), Max: 37 (23 @ 17:20)  HR: 80 (23 @ 20:03)  BP: 134/77 (23 @ 20:03)  SpO2: 96% (23 @ 20:03)  Wt(kg): --    Output      Physical Exam  Gen: NAD  Pulm: No respiratory distress, no subcostal retractions  Abd: Soft, NT, ND, no rebound/guarding  Back: some ttp to R lateral aspect of the body, difficult to appreciate if CVAT vs. MSK pain in nature due to body habitus    LABS:         @ 23:03    WBC --    / Hct --    / SCr 1.82      @ 18:05    WBC 7.64  / Hct 35.0  / SCr 1.81         135  |  103  |  44<H>  ----------------------------<  166<H>  4.5   |  19<L>  |  1.82<H>    Ca    9.3      2023 23:03  Phos  3.2     06-  Mg     1.8     -    TPro  7.3  /  Alb  3.3  /  TBili  0.4  /  DBili  x   /  AST  23  /  ALT  43  /  AlkPhos  128<H>  06-11    PT/INR - ( 2023 18:05 )   PT: 13.9 sec;   INR: 1.20 ratio         PTT - ( 2023 18:05 )  PTT:28.2 sec  Urinalysis Basic - ( 2023 19:52 )    Color: Yellow / Appearance: Slightly Turbid / S.021 / pH: x  Gluc: x / Ketone: Negative  / Bili: Negative / Urobili: Negative   Blood: x / Protein: 100 mg/dl / Nitrite: Negative   Leuk Esterase: Large / RBC: 148 /hpf /  /HPF   Sq Epi: x / Non Sq Epi: x / Bacteria: Negative        Urine Cx: pending  Blood Cx: pending    Radiology:  ACC: [id number] EXAM:  CT ABDOMEN AND PELVIS IC   ORDERED BY: TRISHA MONTOYA     PROCEDURE DATE:  2023          INTERPRETATION:  CLINICAL INFORMATION: Right upper quadrant and right   flank pain for one week. History of liver transplant.    COMPARISON: CT abdomen and pelvis from 6/15/2022..    CONTRAST/COMPLICATIONS:  IV Contrast: Omnipaque 350  90 cc administered   10 cc discarded  Oral Contrast: NONE  Complications: None reported at time of study completion    PROCEDURE:  CT of the Abdomen and Pelvis was performed.  Sagittal and coronal reformats were performed.    FINDINGS:  LOWER CHEST: Valvular, coronary and aortic calcifications are   appreciated. Mild dependent and linear atelectatic changes are   appreciated. Mild wall thickening of the distal esophagus.    LIVER: Within normal limits.  BILE DUCTS: Normal caliber.  GALLBLADDER: Cholecystectomy.  SPLEEN: Within normal limits.  PANCREAS: Within normal limits.  ADRENALS: Within normal limits.  KIDNEYS/URETERS: There is a 6 x 3 mm stone within the left distal ureter.   There is associated left-sided hydroureteronephrosis. There is mild   perinephric stranding. Overall appearance is similar compared to   6/15/2022. There is delayed left-sided nephrogram. There is no   right-sided urinary tract obstruction. Minimal right perinephric   stranding is also appreciated, nonspecific.    BLADDER: Urinary bladder wall thickening, which is somewhat asymmetric   sparing the right posterior lateral wall. Overall appearance is similar   compared to previous exam.  REPRODUCTIVE ORGANS: Prostate within normal limits.    BOWEL: No bowel obstruction. Appendix is normal. There is a small hiatal   hernia.  PERITONEUM: Trace pelvic fluid and right pelvic surgical clip noted..  VESSELS: Atherosclerotic changes. Left and right retroperitoneal varices   are unchanged.  RETROPERITONEUM/LYMPH NODES: No lymphadenopathy.  ABDOMINAL WALL: Postsurgical changes. There is diastasis recti..  BONES: Degenerative changes. Old left seventh rib fracture noted.    IMPRESSION:  There is a 6 x 3 mm stone seen within the left distal ureter. There is   associated left-sided hydroureteronephrosis and delayed nephrogram   compatible with associated obstructive uropathy. The overall appearance   is similar compared to 6/15/2022.    Asymmetric urinary bladder wall thickening, also stable compared to   previous exam. This may be related to sequelae of chronic outlet   obstruction or cystitis. Other bladder wall abnormality not excluded.   Cystoscopy can be obtained for further delineation, if deemed clinically   indicated.    Left greater than right mild perinephric stranding, nonspecific. This can   be seen underlying nephritis or nephropathy.    Question of distal esophagitis.    --- End of Report ---           ASHANTI EVANS MD; Resident Radiologist  This document has been electronically signed.   JASMYN BONILLA M.D., Attending Radiologist  This document has been electronically signed. 2023 11:04PM

## 2023-06-12 NOTE — PATIENT PROFILE ADULT - FALL HARM RISK - HARM RISK INTERVENTIONS
Assistance with ambulation/Assistance OOB with selected safe patient handling equipment/Communicate Risk of Fall with Harm to all staff/Discuss with provider need for PT consult/Monitor gait and stability/Reinforce activity limits and safety measures with patient and family/Reorient to person, place and time as needed/Review medications for side effects contributing to fall risk/Sit up slowly, dangle for a short time, stand at bedside before walking/Tailored Fall Risk Interventions/Toileting schedule using arm’s reach rule for commode and bathroom/Use of alarms - bed, chair and/or voice tab/Visual Cue: Yellow wristband and red socks/Bed in lowest position, wheels locked, appropriate side rails in place/Call bell, personal items and telephone in reach/Instruct patient to call for assistance before getting out of bed or chair/Non-slip footwear when patient is out of bed/[place] to call system/Physically safe environment - no spills, clutter or unnecessary equipment/Purposeful Proactive Rounding/Room/bathroom lighting operational, light cord in reach

## 2023-06-12 NOTE — CONSULT NOTE ADULT - NS ATTEND AMEND GEN_ALL_CORE FT
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  He notes that pain is deana intense he has been very limited in ability to walk or get out of bed. He denies a change in urine or dysuria.  He is reporting general back pain  Denies headaches, fevers, chills, travels, sick contacts  Lives with his son and has a dog  Exam notable for  afebrile now but low grade fever in ED  left side of head with a ventriculostomy like device palpable- he is reporting removal of a 'brain lesion' elsewhere but denies chemotherapy??  lungs clear  Cor- reg  Abd- OLTx scar healed  abd soft non tender  can wiggle toes bilaterally but cannot lift legs secondary to pain    CT scan notable for right hip effusion and left sided ureteral stone with some hydronephrosis  UA with WBCs but no bacteria seen    Not clear to me if pain is related to the renal stone, the right hip effusion or something in his spine  Need further information about recent history and why the ventriculostomy was placed- would obtain head CT scan  Would send blood cultures x2 sets  UA/cx  Orthopedics to see if aspiration of right hip effusion is feasible to send material for cell counts and cultures  Would obtain CT of thoracic and lumbar spines  CMV viral load  EBV viral load  Reduce immunosuppression as feasible in the setting of fevers and suspected infection    Discussed with medicine team

## 2023-06-12 NOTE — H&P ADULT - NSHPSOCIALHISTORY_GEN_ALL_CORE
No tobacco use  No longer drinks alcohol  Ambulates with RW  Lives with 3 kids (Primary point of contact- [place]

## 2023-06-12 NOTE — H&P ADULT - PROBLEM SELECTOR PLAN 1
Presents with R flank pain radiating to the right side of the abdomen; UA + LE and +WBC  CTAP - 6 x 3 mm stone seen within the left distal ureter with L hydronephrosis; asymm urinary bladder thickening; L>R perinephric stranding;    - urology c/s however less likely this may be the cause of the pain; per urology will keep NPO today for possible need of an intervention  - pending blood and urine cultures  - continue with Ceftriaxone given + UA findings   - hold off vanc; will order MRSA Presents with R flank pain radiating to the right side of the abdomen; UA + LE and +WBC  CTAP - 6 x 3 mm stone seen within the left distal ureter with L hydronephrosis; asymm urinary bladder thickening; L>R perinephric stranding;    - urology c/s however less likely this may be the cause of the pain; per urology will keep NPO today for possible need of an intervention  - pending blood and urine cultures  - continue with Ceftriaxone given + UA findings   - hold off vanc; will order MRSA swab  - for pain Morphine 5 (moderate)/ 10 (severe), Dilaudid 0.5 for severe breakthrough pain  - ID c/s this am

## 2023-06-12 NOTE — PROGRESS NOTE ADULT - PROBLEM SELECTOR PLAN 3
Cr 1.82  likely prerenal/ poor PO intake. S/p 250 bolus in the ED    - trend Cr  - f/u urine studies

## 2023-06-12 NOTE — H&P ADULT - ASSESSMENT
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

## 2023-06-12 NOTE — CONSULT NOTE ADULT - SUBJECTIVE AND OBJECTIVE BOX
HPI: JESSICA MARTIN is a 67 year old male with history of HTN, HLD, obesity, T2DM, JEAN cirrhosis s/p OLT 7/2/2020 [currently on  BID and prednisone 10mg daily; intolerant of CNIs due to neurotoxicity; intolerant of everolimus due to prior workup of skin grafts and osteomyelitis], still on Valcyte due to CMV PCR detected 1/18/23, prior VRE bacteremia, s/p multiple toe amputations 9/2022 2/2 infections, osteomyelitis s/p right TMA 1/25/2023, HFpEF, mild LV dysfunction, MGUS, chronic anemia, SHENG, CKD, h/o ureteral stone who now  presents to the ED with back pain and right flank pain.    Patient presents with back pain radiating to his right flank, which has been progressively worsening over the past several days.  Otherwise, patient denies subjective fevers/chills, weight loss, dysphagia, odynophagia, early satiety, poor oral intake, cough, skin rash/wounds, abdominal pain, nausea, vomiting, diarrhea, melena, hematemesis, hematochezia.  He states he is "unaware of his medications," stating his son puts them in a pill box and then "he takes them."  Most recent hepatology clinic note 4/20/2023 describes current regimen of MMF 500mg BID and prednisone 10mg daily    ROS:   General:  No fevers, chills, night sweats, fatigue  Eyes:  Good vision, no reported pain  ENT:  No sore throat, pain, runny nose  CV:  No pain, palpitations  Pulm:  No dyspnea, cough  GI:  See HPI, otherwise negative  :  No  incontinence, nocturia  Muscle:  No pain, weakness  Neuro:  No memory problems  Psych:  No insomnia, mood problems, depression  Endocrine:  No polyuria, polydipsia, cold/heat intolerance  Heme:  No petechiae, ecchymosis, easy bruisability  Skin:  No rash    PMHX/PSHX:    Diabetes    Hypercholesteremia    Neuropathy    Hypertension    Renal stones    Fatty liver disease, nonalcoholic    Obesity    Hepatic encephalopathy    GERD with esophagitis    GIB (gastrointestinal bleeding)      Allergies:  codeine (Anaphylaxis)      Hospital Medications:  acetaminophen     Tablet .. 975 milliGRAM(s) Oral every 8 hours  cefTRIAXone   IVPB 1000 milliGRAM(s) IV Intermittent every 24 hours  dextrose 50% Injectable 25 Gram(s) IV Push once  dextrose 50% Injectable 12.5 Gram(s) IV Push once  dextrose 50% Injectable 25 Gram(s) IV Push once  dextrose Oral Gel 15 Gram(s) Oral once  folic acid 1 milliGRAM(s) Oral daily  glucagon  Injectable 1 milliGRAM(s) IntraMuscular once  heparin   Injectable 5000 Unit(s) SubCutaneous every 8 hours  insulin glargine Injectable (LANTUS) 10 Unit(s) SubCutaneous at bedtime  insulin lispro (ADMELOG) corrective regimen sliding scale   SubCutaneous every 6 hours  lidocaine   4% Patch 3 Patch Transdermal every 24 hours  mycophenolate mofetil 500 milliGRAM(s) Oral two times a day  pantoprazole    Tablet 40 milliGRAM(s) Oral before breakfast  predniSONE   Tablet 10 milliGRAM(s) Oral daily  sertraline 100 milliGRAM(s) Oral daily  tamsulosin 0.4 milliGRAM(s) Oral at bedtime  valGANciclovir 450 milliGRAM(s) Oral daily    MAR over past 24 hours:    acetaminophen     Tablet ..   650 milliGRAM(s) Oral (06-12-23 @ 06:40)    acetaminophen   IVPB ..   400 mL/Hr IV Intermittent (06-12-23 @ 14:26)    cefTRIAXone   IVPB   100 mL/Hr IV Intermittent (06-11-23 @ 23:58)    fentaNYL    Injectable   25 MICROGram(s) IV Push (06-11-23 @ 19:33)    folic acid   1 milliGRAM(s) Oral (06-12-23 @ 11:52)    heparin   Injectable   5000 Unit(s) SubCutaneous (06-12-23 @ 14:26)    insulin lispro (ADMELOG) corrective regimen sliding scale   1 Unit(s) SubCutaneous (06-12-23 @ 11:51)   1  SubCutaneous (06-12-23 @ 06:40)    lidocaine   4% Patch   3 Patch Transdermal (06-12-23 @ 14:26)    lidocaine   4% Patch   1 Patch Transdermal (06-11-23 @ 19:32)    mycophenolate mofetil   500 milliGRAM(s) Oral (06-12-23 @ 09:12)    predniSONE   Tablet   10 milliGRAM(s) Oral (06-12-23 @ 09:12)    sertraline   100 milliGRAM(s) Oral (06-12-23 @ 11:52)    sodium chloride 0.9% Bolus   250 mL/Hr IV Bolus (06-11-23 @ 22:40)    valGANciclovir   450 milliGRAM(s) Oral (06-12-23 @ 11:53)    vancomycin  IVPB.   250 mL/Hr IV Intermittent (06-12-23 @ 01:06)      Home Medications:  Last Order Reconciliation Date: 06-12-23 @ 07:26 (Admission Reconciliation)  CellCept 500 mg oral tablet: 1 tab(s) orally 2 times a day  Flomax 0.4 mg oral capsule: 1 tab(s) orally once a day (at bedtime)  folic acid 1 mg oral tablet: 1 tab(s) orally once a day  Lantus 100 units/mL subcutaneous solution: 14 unit(s) subcutaneous once a day (at bedtime)  pantoprazole 40 mg oral delayed release tablet: 1 tab(s) orally once a day (before a meal)  Please dispense ONE R lower extremity prefabricated AFO walker: Please dispense ONE R lower extremity prefabricated AFO walker  Ind: s/p R foot TMA with LAEH  Disp: 1 prefabricated AFO walker   predniSONE 10 mg oral tablet: 1 tab(s) orally once a day  psyllium 3.4 g/7 g oral powder for reconstitution: 1 packet(s) orally once a day  senna leaf extract oral tablet: 2 tab(s) orally once a day (at bedtime), As needed, Constipation  sertraline 100 mg oral tablet: 1 tab(s) orally once a day  Valcyte 450 mg oral tablet: 1 tab(s) orally once a day (in the morning)  Wound Care Orders: Please apply wound vac with black foam to the R lateral foot graft @75mmHg 3x a week, thank you!    If Wound Vac Fails, please apply Wet to Dry      Social History:   Tobacco: denies  Alcohol: denies  Recreational drugs: denies    Family history:    No pertinent family history in first degree relatives    Family history of stomach cancer    Family history of hypertension    Family history of type 2 diabetes mellitus        PHYSICAL EXAM:   Vital Signs last 24 hours:  T(F): 97.6 (06-12-23 @ 09:12), Max: 100.6 (06-11-23 @ 23:15)  HR: 72 (06-12-23 @ 09:12) (68 - 88)  BP: 135/85 (06-12-23 @ 09:12) (110/63 - 143/79)  BP(mean): --  ABP: --  ABP(mean): --  RR: 16 (06-12-23 @ 09:12) (16 - 20)  SpO2: 96% (06-12-23 @ 09:12) (96% - 100%)    I&Os:    06-12-23 @ 07:01  -  06-12-23 @ 15:00  --------------------------------------------------------  IN:  Total IN: 0 mL    OUT:    Voided (mL): 400 mL  Total OUT: 400 mL    Total NET: -400 mL        BMI (kg/m2): 35 (06-11-23 @ 15:45)  GENERAL: no acute distress  NEURO: alert  HEENT: NCAT, no conjunctival pallor appreciated  CHEST: no respiratory distress, no accessory muscle use  CARDIAC: regular rate, +S1/S2  ABDOMEN: soft, nontender, no rebound or guarding  EXTREMITIES: warm, well perfused, prior skin graft/TMA site well healed and c/d/i  SKIN: no lesions noted    DIAGNOSTICS:  WBC      Hg       PLT      Na       K        CO2     BUN      Cr       ALT      AST      TB       ALP  7.87     9.8      231      135      4.1      19       44       1.90     54       36       0.4      153      06-12-23 @ 08:14  ------   ------   ------   135      4.5      19       44       1.82     43       23       0.4      128      06-11-23 @ 23:03  7.64     11.2     254      137      4.7      18       45       1.81     42       21       0.5      127      06-11-23 @ 18:05  10.88    8.3      478      137      4.0      22       35       1.73     16       13       0.1      120      01-30-23 @ 09:04  ------   ------   ------   136      4.1      19       36       1.54     20       13       0.1      126      01-29-23 @ 09:44    PT             INR            MELDwith  MELDw/o  13.9           1.20           --             --             06-11-23 @ 18:05  13.2           1.15           --             --             01-25-23 @ 07:17  14.0           1.21           24             19             01-24-23 @ 06:59  16.3           1.40           --             --             01-22-23 @ 17:10  11.9           1.03           --             --             12-29-22 @ 06:21   HPI: JESSICA MARTIN is a 67 year old male with history of HTN, HLD, obesity, T2DM, JEAN cirrhosis s/p OLT 7/2/2020 [currently on  BID and prednisone 10mg daily; intolerant of CNIs due to neurotoxicity; intolerant of everolimus due to prior workup of skin grafts and osteomyelitis], still on Valcyte due to CMV PCR detected 1/18/23, prior VRE bacteremia, s/p multiple toe amputations 9/2022 2/2 infections, osteomyelitis s/p right TMA 1/25/2023, HFpEF, mild LV dysfunction, MGUS, chronic anemia, SHENG, CKD, h/o ureteral stone who now  presents to the ED with back pain and right flank pain.    Patient presents with back pain radiating to his right flank, which has been progressively worsening over the past several days.  Otherwise, patient denies subjective fevers/chills, weight loss, dysphagia, odynophagia, early satiety, poor oral intake, cough, skin rash/wounds, abdominal pain, nausea, vomiting, diarrhea, melena, hematemesis, hematochezia.  He states he is "unaware of his medications," stating his son puts them in a pill box and then "he takes them."  Most recent hepatology clinic note 4/20/2023 describes current regimen of MMF 500mg BID and prednisone 10mg daily    ROS:   General:  As per HPI  Eyes:  Good vision, no reported pain  ENT:  No sore throat, pain, runny nose  CV:  No pain, palpitations  Pulm:  No dyspnea, cough  GI:  See HPI, otherwise negative  :  See HPI, otherwise negative  Muscle:  See HPI, otherwise negative  Neuro:  No memory problems or dizziness  Psych:  No insomnia, mood problems, depression  Endocrine:  No polyuria, polydipsia, cold/heat intolerance  Heme:  No petechiae, ecchymosis, easy bruisability  Skin:  No rash    PMHX/PSHX:    Diabetes    Hypercholesteremia    Neuropathy    Hypertension    Renal stones    Fatty liver disease, nonalcoholic    Obesity    Hepatic encephalopathy    GERD with esophagitis    GIB (gastrointestinal bleeding)      Allergies:  codeine (Anaphylaxis)      Hospital Medications:  acetaminophen     Tablet .. 975 milliGRAM(s) Oral every 8 hours  cefTRIAXone   IVPB 1000 milliGRAM(s) IV Intermittent every 24 hours  dextrose 50% Injectable 25 Gram(s) IV Push once  dextrose 50% Injectable 12.5 Gram(s) IV Push once  dextrose 50% Injectable 25 Gram(s) IV Push once  dextrose Oral Gel 15 Gram(s) Oral once  folic acid 1 milliGRAM(s) Oral daily  glucagon  Injectable 1 milliGRAM(s) IntraMuscular once  heparin   Injectable 5000 Unit(s) SubCutaneous every 8 hours  insulin glargine Injectable (LANTUS) 10 Unit(s) SubCutaneous at bedtime  insulin lispro (ADMELOG) corrective regimen sliding scale   SubCutaneous every 6 hours  lidocaine   4% Patch 3 Patch Transdermal every 24 hours  mycophenolate mofetil 500 milliGRAM(s) Oral two times a day  pantoprazole    Tablet 40 milliGRAM(s) Oral before breakfast  predniSONE   Tablet 10 milliGRAM(s) Oral daily  sertraline 100 milliGRAM(s) Oral daily  tamsulosin 0.4 milliGRAM(s) Oral at bedtime  valGANciclovir 450 milliGRAM(s) Oral daily    MAR over past 24 hours:    acetaminophen     Tablet ..   650 milliGRAM(s) Oral (06-12-23 @ 06:40)    acetaminophen   IVPB ..   400 mL/Hr IV Intermittent (06-12-23 @ 14:26)    cefTRIAXone   IVPB   100 mL/Hr IV Intermittent (06-11-23 @ 23:58)    fentaNYL    Injectable   25 MICROGram(s) IV Push (06-11-23 @ 19:33)    folic acid   1 milliGRAM(s) Oral (06-12-23 @ 11:52)    heparin   Injectable   5000 Unit(s) SubCutaneous (06-12-23 @ 14:26)    insulin lispro (ADMELOG) corrective regimen sliding scale   1 Unit(s) SubCutaneous (06-12-23 @ 11:51)   1  SubCutaneous (06-12-23 @ 06:40)    lidocaine   4% Patch   3 Patch Transdermal (06-12-23 @ 14:26)    lidocaine   4% Patch   1 Patch Transdermal (06-11-23 @ 19:32)    mycophenolate mofetil   500 milliGRAM(s) Oral (06-12-23 @ 09:12)    predniSONE   Tablet   10 milliGRAM(s) Oral (06-12-23 @ 09:12)    sertraline   100 milliGRAM(s) Oral (06-12-23 @ 11:52)    sodium chloride 0.9% Bolus   250 mL/Hr IV Bolus (06-11-23 @ 22:40)    valGANciclovir   450 milliGRAM(s) Oral (06-12-23 @ 11:53)    vancomycin  IVPB.   250 mL/Hr IV Intermittent (06-12-23 @ 01:06)      Home Medications:  Last Order Reconciliation Date: 06-12-23 @ 07:26 (Admission Reconciliation)  CellCept 500 mg oral tablet: 1 tab(s) orally 2 times a day  Flomax 0.4 mg oral capsule: 1 tab(s) orally once a day (at bedtime)  folic acid 1 mg oral tablet: 1 tab(s) orally once a day  Lantus 100 units/mL subcutaneous solution: 14 unit(s) subcutaneous once a day (at bedtime)  pantoprazole 40 mg oral delayed release tablet: 1 tab(s) orally once a day (before a meal)  predniSONE 10 mg oral tablet: 1 tab(s) orally once a day  psyllium 3.4 g/7 g oral powder for reconstitution: 1 packet(s) orally once a day  senna leaf extract oral tablet: 2 tab(s) orally once a day (at bedtime), As needed, Constipation  sertraline 100 mg oral tablet: 1 tab(s) orally once a day  Valcyte 450 mg oral tablet: 1 tab(s) orally once a day (in the morning)      Social History:   Tobacco: denies  Alcohol: denies  Recreational drugs: denies    Family history:    No pertinent family history in first degree relatives    Family history of stomach cancer    Family history of hypertension    Family history of type 2 diabetes mellitus        PHYSICAL EXAM:   Vital Signs last 24 hours:  T(F): 97.6 (06-12-23 @ 09:12), Max: 100.6 (06-11-23 @ 23:15)  HR: 72 (06-12-23 @ 09:12) (68 - 88)  BP: 135/85 (06-12-23 @ 09:12) (110/63 - 143/79)  BP(mean): --  ABP: --  ABP(mean): --  RR: 16 (06-12-23 @ 09:12) (16 - 20)  SpO2: 96% (06-12-23 @ 09:12) (96% - 100%)    I&Os:    06-12-23 @ 07:01  -  06-12-23 @ 15:00  --------------------------------------------------------  IN:  Total IN: 0 mL    OUT:    Voided (mL): 400 mL  Total OUT: 400 mL    Total NET: -400 mL    BMI (kg/m2): 35 (06-11-23 @ 15:45)    GENERAL: no acute distress, non-toxic appearing  NEURO: alert, conversant and appropriate, non-tremulous  HEENT: NCAT, no conjunctival pallor appreciated, sclera anicteric  CHEST: no respiratory distress, no accessory muscle use, lungs CTAB  CARDIAC: regular rate, +S1/S2, no JVD  ABDOMEN: +BS, +central adiposity, non-distended, soft, NTTP, +healed chevron scar, no suprapubic tenderness  BACK: +R CVA tenderness  EXTREMITIES: warm, well perfused, prior R foot skin graft/TMA site well healed and c/d/i  SKIN: no jaundice    DIAGNOSTICS:  WBC      Hg       PLT      Na       K        CO2     BUN      Cr       ALT      AST      TB       ALP  7.87     9.8      231      135      4.1      19       44       1.90     54       36       0.4      153      06-12-23 @ 08:14  ------   ------   ------   135      4.5      19       44       1.82     43       23       0.4      128      06-11-23 @ 23:03  7.64     11.2     254      137      4.7      18       45       1.81     42       21       0.5      127      06-11-23 @ 18:05  10.88    8.3      478      137      4.0      22       35       1.73     16       13       0.1      120      01-30-23 @ 09:04  ------   ------   ------   136      4.1      19       36       1.54     20       13       0.1      126      01-29-23 @ 09:44    PT             INR            MELDwith  MELDw/o  13.9           1.20           --             --             06-11-23 @ 18:05  13.2           1.15           --             --             01-25-23 @ 07:17  14.0           1.21           24             19             01-24-23 @ 06:59  16.3           1.40           --             --             01-22-23 @ 17:10  11.9           1.03           --             --             12-29-22 @ 06:21

## 2023-06-12 NOTE — H&P ADULT - PROBLEM SELECTOR PLAN 3
Home medications: Lantus 14 qhs and Admelog 8u TID    - c/w ISS and Lantus 10 qhs Cr 1.82  likely prerenal/ poor PO intake. S/p 250 bolus in the ED    - trend Cr  - f/u urine studies

## 2023-06-12 NOTE — CONSULT NOTE ADULT - SUBJECTIVE AND OBJECTIVE BOX
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 Pt states this pain began ~2 days ago, Denies recent trauma, travel, infections, or sick contacts. He denies N/F/V/C, CP, SOB, diarrhea, constipation, melena/ hematochezia, dysuria, and hematuria. State the R back/ flank pain moves around to the right side of the abdomen. The hip pain does not move to groin or down the extremity. States both these pains worsen with movement. Pt also reports L neck pain moving to shoulder; states it has been difficult to rotate head. Reports fatigue few weeks prior to onset of symptoms. Due to pain, he has addison unable to get out of bed. States pain mildly improves with Tylenol. Pt has not had any recent medication changes.     In the ED- Tmax 100,2, SBP 130s, HR 70- 80  Labs- WBC 7.64, Hgb 11.2, Plt 254, BUN 44, Cr 1.82, VBG- 7.35/37/33/20 lactate 1.0, UA + LE +  [id number]  Imaging  -CTAP 6 x 3 mm stone seen within the left distal ureter with L hydronephrosis; asymm urinary bladder thickening; L>R perinephric stranding; possible distal esophagitis; trace pelvic fluid with right pelvic surgical clip  -ED POCUS: right hip effusion  Meds- CTX (11pm), Vanc (1am), Fentanly 25, NS bolus 250 (2023 05:38)     prior hospital charts reviewed [ X ]  primary team notes reviewed [ X ]  other consultant notes reviewed [ X ]    PAST MEDICAL & SURGICAL HISTORY:  Diabetes      Hypercholesteremia      Neuropathy      Hypertension      Renal stones  25 years ago      Fatty liver disease, nonalcoholic      Obesity      Hepatic encephalopathy      GERD with esophagitis  Gastritis & Non Bleeding Ulcers      GIB (gastrointestinal bleeding)      S/P cholecystectomy          Allergies  codeine (Anaphylaxis)    ANTIMICROBIALS (past 90 days)  MEDICATIONS  (STANDING):    cefTRIAXone   IVPB   100 mL/Hr IV Intermittent (23 @ 23:58)    valGANciclovir   450 milliGRAM(s) Oral (23 @ 11:53)    vancomycin  IVPB.   250 mL/Hr IV Intermittent (23 @ 01:06)      ANTIMICROBIALS:    cefTRIAXone   IVPB 1000 every 24 hours  valGANciclovir 450 daily    OTHER MEDS: MEDICATIONS  (STANDING):  acetaminophen     Tablet .. 975 every 8 hours  dextrose 50% Injectable 25 once  dextrose 50% Injectable 25 once  dextrose 50% Injectable 12.5 once  dextrose Oral Gel 15 once  glucagon  Injectable 1 once  heparin   Injectable 5000 every 8 hours  insulin glargine Injectable (LANTUS) 10 at bedtime  insulin lispro (ADMELOG) corrective regimen sliding scale  every 6 hours  mycophenolate mofetil 500 two times a day  pantoprazole    Tablet 40 before breakfast  predniSONE   Tablet 10 daily  sertraline 100 daily  tamsulosin 0.4 at bedtime    SOCIAL HISTORY:   hx smoking  non-smoker    FAMILY HISTORY:  Family history of stomach cancer    Family history of hypertension    Family history of type 2 diabetes mellitus      REVIEW OF SYSTEMS  [  ] ROS unobtainable because:    [ X ] All other systems negative except as noted below:	    Constitutional:  [ ] fever [ ] chills  [ ] weight loss  [ ] weakness  Skin:  [ ] rash [ ] phlebitis	  Eyes: [ ] icterus [ ] pain  [ ] discharge	  ENMT: [ ] sore throat  [ ] thrush [ ] ulcers [ ] exudates  Respiratory: [ ] dyspnea [ ] hemoptysis [ ] cough [ ] sputum	  Cardiovascular:  [ ] chest pain [ ] palpitations [ ] edema	  Gastrointestinal:  [ ] nausea [ ] vomiting [ ] diarrhea [ ] constipation [ ] pain	  Genitourinary:  [ ] dysuria [ ] frequency [ ] hematuria [ ] discharge [ ] flank pain  [ ] incontinence  Musculoskeletal:  [ ] myalgias [ ] arthralgias [ ] arthritis  [ ] back pain  Neurological:  [ ] headache [ ] seizures  [ ] confusion/altered mental status  Psychiatric:  [ ] anxiety [ ] depression	  Hematology/Lymphatics:  [ ] lymphadenopathy  Endocrine:  [ ] adrenal [ ] thyroid  Allergic/Immunologic:	 [ ] transplant [ ] seasonal    Vital Signs Last 24 Hrs  T(F): 97.6 (23 @ 09:12), Max: 100.6 (23 @ 23:15)  Vital Signs Last 24 Hrs  HR: 72 (23 @ 09:12) (68 - 88)  BP: 135/85 (23 @ 09:12) (110/63 - 143/79)  RR: 16 (23 @ 09:12)  SpO2: 96% (23 @ 09:12) (96% - 100%)  Wt(kg): --    PHYSICAL EXAMINATION:  General: Alert and Awake, NAD  HEENT: PERRL, EOMI, No subconjunctival hemorrhages, Oropharynx Clear, MMM  Neck: Supple, No CHELSEA  Cardiac: RRR, No M/R/G  Resp: CTAB, No Wh/Rh/Ra  Abdomen: NBS, NT/ND, No HSM, No rigidity or guarding  MSK: No LE edema. No stigmata of IE. No evidence of phlebitis. No evidence of synovitis.  : No bob  Skin: No rashes or lesions. Skin is warm and dry to the touch.   Neuro: Alert and Awake. CN 2-12 Grossly intact. Moves all four extremities spontaneously.  Psych: Calm, Pleasant, Cooperative                          9.8    7.87  )-----------( 231      ( 2023 08:14 )             30.9     06-12    135  |  102  |  44<H>  ----------------------------<  158<H>  4.1   |  19<L>  |  1.90<H>    Ca    8.9      2023 08:14  Phos  2.9       Mg     1.8         TPro  6.9  /  Alb  3.3  /  TBili  0.4  /  DBili  x   /  AST  36  /  ALT  54<H>  /  AlkPhos  153<H>  12    Urinalysis Basic - ( 2023 08:17 )    Color: Light Yellow / Appearance: Clear / S.046 / pH: x  Gluc: x / Ketone: Negative  / Bili: Negative / Urobili: Negative   Blood: x / Protein: 100 mg/dl / Nitrite: Negative   Leuk Esterase: Large / RBC: 67 /hpf /  /HPF   Sq Epi: x / Non Sq Epi: x / Bacteria: Negative    MICROBIOLOGY:                RADIOLOGY:    <The imaging below has been reviewed and visualized by me independently. 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 Pt states this pain began ~2 days ago, Denies recent trauma, travel, infections, or sick contacts. He denies N/F/V/C, CP, SOB, diarrhea, constipation, melena/ hematochezia, dysuria, and hematuria. State the R back/ flank pain moves around to the right side of the abdomen. The hip pain does not move to groin or down the extremity. States both these pains worsen with movement. Pt also reports L neck pain moving to shoulder; states it has been difficult to rotate head. Reports fatigue few weeks prior to onset of symptoms. Due to pain, he has addison unable to get out of bed. States pain mildly improves with Tylenol. Pt has not had any recent medication changes.     In the ED- Tmax 100,2, SBP 130s, HR 70- 80  Labs- WBC 7.64, Hgb 11.2, Plt 254, BUN 44, Cr 1.82, VBG- 7.35/37/33/20 lactate 1.0, UA + LE +  [id number]  Imaging  -CTAP 6 x 3 mm stone seen within the left distal ureter with L hydronephrosis; asymm urinary bladder thickening; L>R perinephric stranding; possible distal esophagitis; trace pelvic fluid with right pelvic surgical clip  -ED POCUS: right hip effusion  Meds- CTX (11pm), Vanc (1am), Fentanly 25, NS bolus 250 (2023 05:38)     prior hospital charts reviewed [ X ]  primary team notes reviewed [ X ]  other consultant notes reviewed [ X ]    PAST MEDICAL & SURGICAL HISTORY:  Diabetes      Hypercholesteremia      Neuropathy      Hypertension      Renal stones  25 years ago      Fatty liver disease, nonalcoholic      Obesity      Hepatic encephalopathy      GERD with esophagitis  Gastritis & Non Bleeding Ulcers      GIB (gastrointestinal bleeding)      S/P cholecystectomy          Allergies  codeine (Anaphylaxis)    ANTIMICROBIALS (past 90 days)  MEDICATIONS  (STANDING):    cefTRIAXone   IVPB   100 mL/Hr IV Intermittent (23 @ 23:58)    valGANciclovir   450 milliGRAM(s) Oral (23 @ 11:53)    vancomycin  IVPB.   250 mL/Hr IV Intermittent (23 @ 01:06)      ANTIMICROBIALS:    cefTRIAXone   IVPB 1000 every 24 hours  valGANciclovir 450 daily    OTHER MEDS: MEDICATIONS  (STANDING):  acetaminophen     Tablet .. 975 every 8 hours  dextrose 50% Injectable 25 once  dextrose 50% Injectable 25 once  dextrose 50% Injectable 12.5 once  dextrose Oral Gel 15 once  glucagon  Injectable 1 once  heparin   Injectable 5000 every 8 hours  insulin glargine Injectable (LANTUS) 10 at bedtime  insulin lispro (ADMELOG) corrective regimen sliding scale  every 6 hours  mycophenolate mofetil 500 two times a day  pantoprazole    Tablet 40 before breakfast  predniSONE   Tablet 10 daily  sertraline 100 daily  tamsulosin 0.4 at bedtime    SOCIAL HISTORY:   hx smoking  non-smoker    FAMILY HISTORY:  Family history of stomach cancer    Family history of hypertension    Family history of type 2 diabetes mellitus      REVIEW OF SYSTEMS  [  ] ROS unobtainable because:    [ X ] All other systems negative except as noted below:	    Constitutional:  [ ] fever [ ] chills  [ ] weight loss  [ ] weakness  Skin:  [ ] rash [ ] phlebitis	  Eyes: [ ] icterus [ ] pain  [ ] discharge	  ENMT: [ ] sore throat  [ ] thrush [ ] ulcers [ ] exudates  Respiratory: [ ] dyspnea [ ] hemoptysis [ ] cough [ ] sputum	  Cardiovascular:  [ ] chest pain [ ] palpitations [ ] edema	  Gastrointestinal:  [ ] nausea [ ] vomiting [ ] diarrhea [ ] constipation [ ] pain	  Genitourinary:  [ ] dysuria [ ] frequency [ ] hematuria [ ] discharge [ ] flank pain  [ ] incontinence  Musculoskeletal:  [ ] myalgias [ ] arthralgias [ ] arthritis  [x ] back, right leg and neck pain  Neurological:  [ ] headache [ ] seizures  [ ] confusion/altered mental status  Psychiatric:  [ ] anxiety [ ] depression	  Hematology/Lymphatics:  [ ] lymphadenopathy  Endocrine:  [ ] adrenal [ ] thyroid  Allergic/Immunologic:	 [ ] transplant [ ] seasonal    Vital Signs Last 24 Hrs  T(F): 97.6 (23 @ 09:12), Max: 100.6 (23 @ 23:15)  Vital Signs Last 24 Hrs  HR: 72 (23 @ 09:12) (68 - 88)  BP: 135/85 (23 @ 09:12) (110/63 - 143/79)  RR: 16 (23 @ 09:12)  SpO2: 96% (23 @ 09:12) (96% - 100%)  Wt(kg): --    PHYSICAL EXAMINATION:  General: Alert and Awake, NAD. Lying flat in bed  HEENT: PERRL, EOMI, No subconjunctival hemorrhages, Oropharynx Clear, MMM  Neck: Supple, No CHELSEA  Cardiac: RRR, No M/R/G  Resp: CTAB, No Wh/Rh/Ra  Abdomen: NBS, NT/ND, No HSM, No rigidity or guarding  MSK: No LE edema. No stigmata of IE. No evidence of phlebitis. No evidence of synovitis.  : No Beasley  Skin: No rashes or lesions. Skin is warm and dry to the touch.   Neuro: Alert and Awake. CN 2-12 Grossly intact. Moves all four extremities spontaneously.  Psych: Calm, Pleasant, Cooperative                          9.8    7.87  )-----------( 231      ( 2023 08:14 )             30.9     06-12    135  |  102  |  44<H>  ----------------------------<  158<H>  4.1   |  19<L>  |  1.90<H>    Ca    8.9      2023 08:14  Phos  2.9       Mg     1.8         TPro  6.9  /  Alb  3.3  /  TBili  0.4  /  DBili  x   /  AST  36  /  ALT  54<H>  /  AlkPhos  153<H>  12    Urinalysis Basic - ( 2023 08:17 )    Color: Light Yellow / Appearance: Clear / S.046 / pH: x  Gluc: x / Ketone: Negative  / Bili: Negative / Urobili: Negative   Blood: x / Protein: 100 mg/dl / Nitrite: Negative   Leuk Esterase: Large / RBC: 67 /hpf /  /HPF   Sq Epi: x / Non Sq Epi: x / Bacteria: Negative    MICROBIOLOGY:                RADIOLOGY:    <The imaging below has been reviewed and visualized by me independently. Findings as detailed in report below>    < from: POCUS ED Kidney and Bladder (23 @ 23:12) >  IMPRESSION:  No hydronephrosis present in the right kidney  Mild left sided hydronephrosis.  A right hip effusion was present    --- End of Report ---      < end of copied text >  < from: Xray Chest 1 View AP/PA (23 @ 00:15) >  IMPRESSION:  Left midlung and right basilar linear atelectasis.    --- End of Report ---        < end of copied text >  < from: CT Abdomen and Pelvis w/ IV Cont (23 @ 21:19) >    IMPRESSION:  There is a 6 x 3 mm stone seen within the left distal ureter. There is   associated left-sided hydroureteronephrosis and delayed nephrogram   compatible with associated obstructive uropathy. The overall appearance   is similar compared to 6/15/2022.    Asymmetric urinary bladder wall thickening, also stable compared to   previous exam. This may be related to sequelae of chronic outlet   obstruction or cystitis. Other bladder wall abnormality not excluded.   Cystoscopy can be obtained for further delineation, if deemed clinically   indicated.    Left greater than right mild perinephric stranding, nonspecific. This can   be seen underlying nephritis or nephropathy.    Question of distal esophagitis.    --- End of Report ---    < end of copied text >

## 2023-06-13 PROBLEM — L97.413: Status: ACTIVE | Noted: 2021-01-19

## 2023-06-13 NOTE — PROGRESS NOTE ADULT - ASSESSMENT
67y Male pmh of obesity, IDDM, HFpEF, JEAN cirrhosis s/p liver transplant, nephrolithiasis x 1, passed organically years ago, presenting with RLQ, R mid abdominal/flank pain, R hip/back pain for the past week.   CT abd pelvis demonstrated stable appearing 6mm L distal stone. Labs showed wbc 7.64, Cr 1.81.  -no acute urologic intervention warranted emergently overnight w/o any fevers.   -low suspicion that L sided stone is etiology for right sided pain   -recommend medical expulsive therapy w/ flomax 0.4mg qhs   - strain urine for stone   - no planned  intervention at this time

## 2023-06-13 NOTE — PROGRESS NOTE ADULT - SUBJECTIVE AND OBJECTIVE BOX
DAILY PROGRESS NOTE:         24 hr events:  naeon   afebrile     Objective:    Vital Signs Last 24 Hrs  T(C): 37.1 (2023 04:30), Max: 37.3 (2023 16:24)  T(F): 98.8 (2023 04:30), Max: 99.1 (2023 16:24)  HR: 75 (2023 04:30) (71 - 80)  BP: 136/67 (2023 04:30) (125/72 - 149/72)  BP(mean): --  RR: 18 (2023 04:30) (16 - 18)  SpO2: 94% (2023 04:30) (94% - 98%)    Parameters below as of 2023 04:30  Patient On (Oxygen Delivery Method): room air        I&O's Detail    2023 07:01  -  2023 07:00  --------------------------------------------------------  IN:  Total IN: 0 mL    OUT:    Voided (mL): 1550 mL  Total OUT: 1550 mL    Total NET: -1550 mL          Physical Exam:    General: NAD, well-nourished  Resp: Breathing comfortably on RA  CV: regular rate   : no cva ttp    Laboratory Results:                          9.8    7.87  )-----------( 231      ( 2023 08:14 )             30.9     06-12    135  |  102  |  44<H>  ----------------------------<  158<H>  4.1   |  19<L>  |  1.90<H>    Ca    8.9      2023 08:14  Phos  2.9     06-12  Mg     1.8     12    TPro  6.9  /  Alb  3.3  /  TBili  0.4  /  DBili  x   /  AST  36  /  ALT  54<H>  /  AlkPhos  153<H>  0612    PT/INR - ( 2023 18:05 )   PT: 13.9 sec;   INR: 1.20 ratio         PTT - ( 2023 18:05 )  PTT:28.2 sec  Urinalysis Basic - ( 2023 08:17 )    Color: Light Yellow / Appearance: Clear / S.046 / pH: x  Gluc: x / Ketone: Negative  / Bili: Negative / Urobili: Negative   Blood: x / Protein: 100 mg/dl / Nitrite: Negative   Leuk Esterase: Large / RBC: 67 /hpf /  /HPF   Sq Epi: x / Non Sq Epi: x / Bacteria: Negative

## 2023-06-13 NOTE — PHYSICAL THERAPY INITIAL EVALUATION ADULT - BED MOBILITY LIMITATIONS, REHAB EVAL
decreased ability to use legs for bridging/pushing decreased ability to use legs for bridging/pushing/impaired ability to control trunk for mobility

## 2023-06-13 NOTE — PHYSICAL THERAPY INITIAL EVALUATION ADULT - NSACTIVITYREC_GEN_A_PT
OOB to chair when approriate  Ambulate daily when approriate OOB to chair when appropriate  Ambulate daily when appropriate

## 2023-06-13 NOTE — PROGRESS NOTE ADULT - SUBJECTIVE AND OBJECTIVE BOX
Follow Up:    UTI     Interval History:  Continues to have extreme back pain  Denies fever, chills, body aches    REVIEW OF SYSTEMS  [  ] ROS unobtainable because:    [ X ] All other systems negative except as noted below    Constitutional:  [ ] fever [ ] chills  [ ] weight loss  [ ] weakness  Skin:  [ ] rash [ ] phlebitis	  Eyes: [ ] icterus [ ] pain  [ ] discharge	  ENMT: [ ] sore throat  [ ] thrush [ ] ulcers [ ] exudates  Respiratory: [ ] dyspnea [ ] hemoptysis [ ] cough [ ] sputum	  Cardiovascular:  [ ] chest pain [ ] palpitations [ ] edema	  Gastrointestinal:  [ ] nausea [ ] vomiting [ ] diarrhea [ ] constipation [ ] pain	  Genitourinary:  [ ] dysuria [ ] frequency [ ] hematuria [ ] discharge [ ] flank pain  [ ] incontinence  Musculoskeletal:  [ ] myalgias [ ] arthralgias [ ] arthritis  [X ] back pain  Neurological:  [ ] headache [ ] seizures  [ ] confusion/altered mental status    Allergies  codeine (Anaphylaxis)        ANTIMICROBIALS:  ertapenem  IVPB    ertapenem  IVPB 1000 every 24 hours  valGANciclovir 450 daily  vancomycin  IVPB 1250 every 24 hours      OTHER MEDS:  MEDICATIONS  (STANDING):  acetaminophen     Tablet .. 975 every 8 hours  acetaminophen   IVPB .. 1000 once  ALPRAZolam 0.5 once PRN  dextrose 50% Injectable 25 once  dextrose 50% Injectable 12.5 once  dextrose 50% Injectable 25 once  dextrose Oral Gel 15 once  glucagon  Injectable 1 once  heparin   Injectable 5000 every 8 hours  insulin glargine Injectable (LANTUS) 10 at bedtime  insulin lispro (ADMELOG) corrective regimen sliding scale  three times a day before meals  insulin lispro (ADMELOG) corrective regimen sliding scale  at bedtime  mycophenolate mofetil 500 two times a day  pantoprazole    Tablet 40 before breakfast  predniSONE   Tablet 10 daily  sertraline 100 daily  tamsulosin 0.4 at bedtime      Vital Signs Last 24 Hrs  T(C): 36.7 (2023 12:10), Max: 37.2 (2023 21:00)  T(F): 98.1 (2023 12:10), Max: 99 (2023 21:00)  HR: 75 (2023 12:10) (74 - 75)  BP: 136/71 (2023 12:10) (136/67 - 145/66)  BP(mean): --  RR: 18 (2023 12:10) (18 - 18)  SpO2: 99% (2023 12:10) (94% - 99%)    Parameters below as of 2023 12:10  Patient On (Oxygen Delivery Method): room air        PHYSICAL EXAMINATION:  General: Alert and Awake, NAD  HEENT: PERRL, EOMI  Neck: Supple  Cardiac: RRR, No M/R/G  Resp: CTAB, No Wh/Rh/Ra  Abdomen: NBS, NT/ND, No HSM, No rigidity or guarding  MSK: No LE edema. No Calf tenderness  : No bob  Skin: No rashes or lesions. Skin is warm and dry to the touch.   Neuro: Alert and Awake. CN 2-12 Grossly intact. Moves all four extremities spontaneously.  Psych: Calm, Pleasant, Cooperative                          10.3   7.76  )-----------( 237      ( 2023 07:11 )             32.6       06-    137  |  103  |  41<H>  ----------------------------<  103<H>  4.2   |  19<L>  |  1.81<H>    Ca    9.2      2023 07:14  Phos  3.1       Mg     1.9         TPro  7.0  /  Alb  3.3  /  TBili  0.4  /  DBili  x   /  AST  24  /  ALT  52<H>  /  AlkPhos  173<H>        Urinalysis Basic - ( 2023 08:17 )    Color: Light Yellow / Appearance: Clear / S.046 / pH: x  Gluc: x / Ketone: Negative  / Bili: Negative / Urobili: Negative   Blood: x / Protein: 100 mg/dl / Nitrite: Negative   Leuk Esterase: Large / RBC: 67 /hpf /  /HPF   Sq Epi: x / Non Sq Epi: x / Bacteria: Negative        MICROBIOLOGY:  v  .Blood Blood  23   Growth in aerobic bottle: Gram Positive Rods  --    Growth in aerobic bottle: Gram Positive Rods      .Blood Blood  23   Growth in aerobic bottle: Gram Positive Rods  ***Blood Panel PCR results on this specimen are available  approximately 3 hours after the Gram stain result.***  Gram stain, PCR, and/or culture results may not always  correspond due to difference in methodologies.  ************************************************************  This PCR assay was performed by multiplex PCR. This  Assay tests for 66 bacterial and resistance gene targets.  Please refer to the [facility] test directory  at [email or web address] for details.  --  Blood Culture PCR      Clean Catch Clean Catch (Midstream)  23   <10,000 CFU/mL Normal Urogenital Shantel  --  --          Rapid RVP Result: NotDetec ( @ 09:00)  CMVPCR Log: NotDetec [id number]/mL ( @ 07:13)        RADIOLOGY:    <The imaging below has been reviewed and visualized by me independently. Findings as detailed in report below>

## 2023-06-13 NOTE — PROGRESS NOTE ADULT - ASSESSMENT
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 Pt states this pain began ~2 days ago, Denies recent trauma, travel, infections, or sick contacts. He denies N/F/V/C, CP, SOB, diarrhea, constipation, melena/ hematochezia, dysuria, and hematuria. State the R back/ flank pain moves around to the right side of the abdomen. The hip pain does not move to groin or down the extremity. States both these pains worsen with movement. Pt also reports L neck pain moving to shoulder; states it has been difficult to rotate head. Reports fatigue few weeks prior to onset of symptoms. Due to pain, he has addison unable to get out of bed. States pain mildly improves with Tylenol. Pt has not had any recent medication changes.     In the ED- Tmax 100,2, SBP 130s, HR 70- 80  Labs- WBC 7.64, Hgb 11.2, Plt 254, BUN 44, Cr 1.82, VBG- 7.35/37/33/20 lactate 1.0, UA + LE +  [id number]  Imaging  -CTAP 6 x 3 mm stone seen within the left distal ureter with L hydronephrosis; asymm urinary bladder thickening; L>R perinephric stranding; possible distal esophagitis; trace pelvic fluid with right pelvic surgical clip  -ED POCUS: right hip effusion  Meds- CTX (11pm), Vanc (1am), Fentanly 25, NS bolus 250 (12 Jun 2023 05:38)      #hydronephrosis, back pain, Right hip effusion  --UA (6/11, 6/12) - Large leukocyte esterase, 279 WBC, no bacteria or nitrite   --Afebrile - curve temperatures   --No leukocytosis - trend CBC  --MRSA/MSSA Nares - negative  --CT A/P (6/11) - 6X3 mm stone within left ureter  with left hydronephrosis  --RVP negative  --Blood culture (6/12) - +GNR, +pcr corynebacterium species    Recommendations:  --Continue Vancomycin 1250 mg Q24 hr, check trough pre 3rd, and Ertapenem 1 gm Q24 hr. Considering foot abscess from 01/22/23 was positive for Klebsiella PNA, bacteroides thetaiotamicron actinomyces odontolyticus strep mitis/oralis and strep agalactiae.   --Follow MRI of Thoracic, cervical and lumbar spine (Hip effusion)  --Follow urine culture  --Follow blood cultures    Case discussed with Dr. Menezes

## 2023-06-13 NOTE — ASSESSMENT
[FreeTextEntry1] : 66M presents s/p right foot tma and lateral wound\par - Pt was seen and evaluated. \par - Right foot lateral 5th met wound debrided to sub q and not beyond with dermal currette with erik dsd applied today\par - Clean Bone Margin Culture: No growth.\par - Clean Bone Margin Pathology: No osteomyelitis..\par - Pt to ED if N/C/V/SOB symptoms presents, verbal understanding demontrated\par \par Patient read the consent and signed it prior to the initiation of any screening procedure. Patient had the opportunity to discuss any questions regarding the study. I witnessed the signing of the consent and the patient was given a copy of the signed consent.\par - RTC in 2 weeks, will contact insurance carrier for coverage of kerecis graft next visit and will inquire regarding --cotninue with topical e02

## 2023-06-13 NOTE — PROGRESS NOTE ADULT - ASSESSMENT
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 Found to have + corynebacterium in 2/4 cultures.

## 2023-06-13 NOTE — PHYSICAL THERAPY INITIAL EVALUATION ADULT - PERTINENT HX OF CURRENT PROBLEM, REHAB EVAL
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 Found to have + corynebacterium in 2/4 cultures.   Dx:  Acute right flank pain, Acute hip pain, right, MISA (acute kidney injury), Type 2 diabetes mellitus, S/P liver transplant, Gram-positive bacteremia

## 2023-06-13 NOTE — PROGRESS NOTE ADULT - PROBLEM SELECTOR PLAN 6
Home medication: Ezetimibe 10mg, Lovaza 1g, Prednisone 10mg daily, Cellcept 500 BID, Valcyte 450 QD, Protonix 40  - everolimus on hold for skin graft, will restart per transplant hepatology.

## 2023-06-13 NOTE — PHYSICAL EXAM
[0] : left 0 [2+] : left 2+ [Please See PDF for [employer]] : Please See PDF for [employer]. [de-identified] : s/p right foot partial hallux and partial second ray resections. BL midtarsal joint 13, patella orientation central, ankle dorsiflexion 12 degrees, ankle plantarflexion 12 degrees, STJ supination 15 degrees, STJ pronation 9 degrees, hallux dorsiflexion 55 degrees, hallux plantarflexion 35 degrees, no toe contractures noted. [de-identified] : Right foot second toe incision site sutures intact, no hematoma formation, no fluctuance, skin edges well coapted, flaps warm and viable, no clinical signs of infection. Right foot lateral fifth metatarsal head eschar with periwound erythema, no excess heat, no drainage, no purulence. Right foot hallux prior incision site hyperkeratosis with no clinical signs of infection. Left foot no wounds, no signs of infection. [de-identified] : Bilateral sensation diminished to the level of the toes.

## 2023-06-13 NOTE — PROGRESS NOTE ADULT - PROBLEM SELECTOR PLAN 4
- FeNa 0.9%, s/p IV contrast, however baseline around 1.8-2. Likely at baseline.   Cr 1.82  likely prerenal/ poor PO intake. S/p 250 bolus in the ED

## 2023-06-13 NOTE — PROGRESS NOTE ADULT - SUBJECTIVE AND OBJECTIVE BOX
PROGRESS NOTE:   Authored by Morgan Fairchild MD   Patient is a 67y old  Male who presents with a chief complaint of back pain (2023 15:00)      SUBJECTIVE / OVERNIGHT EVENTS:  No acute events overnight.     ADDITIONAL REVIEW OF SYSTEMS:    MEDICATIONS  (STANDING):  acetaminophen     Tablet .. 975 milliGRAM(s) Oral every 8 hours  dextrose 5%. 1000 milliLiter(s) (50 mL/Hr) IV Continuous <Continuous>  dextrose 5%. 1000 milliLiter(s) (100 mL/Hr) IV Continuous <Continuous>  dextrose 50% Injectable 25 Gram(s) IV Push once  dextrose 50% Injectable 25 Gram(s) IV Push once  dextrose 50% Injectable 12.5 Gram(s) IV Push once  dextrose Oral Gel 15 Gram(s) Oral once  ertapenem  IVPB      ertapenem  IVPB 1000 milliGRAM(s) IV Intermittent every 24 hours  folic acid 1 milliGRAM(s) Oral daily  glucagon  Injectable 1 milliGRAM(s) IntraMuscular once  heparin   Injectable 5000 Unit(s) SubCutaneous every 8 hours  insulin glargine Injectable (LANTUS) 10 Unit(s) SubCutaneous at bedtime  insulin lispro (ADMELOG) corrective regimen sliding scale   SubCutaneous three times a day before meals  insulin lispro (ADMELOG) corrective regimen sliding scale   SubCutaneous at bedtime  lidocaine   4% Patch 3 Patch Transdermal every 24 hours  mycophenolate mofetil 500 milliGRAM(s) Oral two times a day  pantoprazole    Tablet 40 milliGRAM(s) Oral before breakfast  predniSONE   Tablet 10 milliGRAM(s) Oral daily  sertraline 100 milliGRAM(s) Oral daily  tamsulosin 0.4 milliGRAM(s) Oral at bedtime  valGANciclovir 450 milliGRAM(s) Oral daily  vancomycin  IVPB 1250 milliGRAM(s) IV Intermittent every 24 hours    MEDICATIONS  (PRN):  ALPRAZolam 0.5 milliGRAM(s) Oral once PRN Prior to MRI      CAPILLARY BLOOD GLUCOSE      POCT Blood Glucose.: 136 mg/dL (2023 21:48)  POCT Blood Glucose.: 170 mg/dL (2023 18:02)  POCT Blood Glucose.: 177 mg/dL (2023 11:29)    I&O's Summary    2023 07:01  -  2023 07:00  --------------------------------------------------------  IN: 0 mL / OUT: 1550 mL / NET: -1550 mL        PHYSICAL EXAM:  Vital Signs Last 24 Hrs  T(C): 37.1 (2023 04:30), Max: 37.3 (2023 16:24)  T(F): 98.8 (2023 04:30), Max: 99.1 (2023 16:24)  HR: 75 (2023 04:30) (71 - 80)  BP: 136/67 (:30) (125/72 - 149/72)  BP(mean): --  RR: 18 (:30) (16 - 18)  SpO2: 94% (:30) (94% - 98%)    Parameters below as of 2023 04:30  Patient On (Oxygen Delivery Method): room air        GENERAL: No apparent distress.   HEAD:  Atraumatic, Normocephalic  EYES: EOMI, PERRLA, conjunctiva and sclera clear  NECK: Supple, no lymphadenopathy, no elevated JVP  CHEST/LUNG: Clear to auscultation bilateral and symmetric; No wheezes, rales, or rhonchi  HEART: S1 and S2 normal. Regular rate and rhythm; No murmurs, rubs, or gallops  ABDOMEN: Soft, non-tender, non-distended; normal bowel sounds  EXTREMITIES:  2+ peripheral pulses b/l, No clubbing, cyanosis, or edema  NEUROLOGY: A&O x 3, no focal deficits  SKIN: No rashes or lesions    LABS:                        9.8    7.87  )-----------( 231      ( 2023 08:14 )             30.9     06-12    135  |  102  |  44<H>  ----------------------------<  158<H>  4.1   |  19<L>  |  1.90<H>    Ca    8.9      2023 08:14  Phos  2.9     06-12  Mg     1.8     06-12    TPro  6.9  /  Alb  3.3  /  TBili  0.4  /  DBili  x   /  AST  36  /  ALT  54<H>  /  AlkPhos  153<H>  12    PT/INR - ( 2023 18:05 )   PT: 13.9 sec;   INR: 1.20 ratio         PTT - ( 2023 18:05 )  PTT:28.2 sec  CARDIAC MARKERS ( 2023 23:03 )  x     / x     / 24 U/L / x     / x          Urinalysis Basic - ( 2023 08:17 )    Color: Light Yellow / Appearance: Clear / S.046 / pH: x  Gluc: x / Ketone: Negative  / Bili: Negative / Urobili: Negative   Blood: x / Protein: 100 mg/dl / Nitrite: Negative   Leuk Esterase: Large / RBC: 67 /hpf /  /HPF   Sq Epi: x / Non Sq Epi: x / Bacteria: Negative        Culture - Blood (collected 2023 22:40)  Source: .Blood Blood  Gram Stain (2023 01:19):    Growth in aerobic bottle: Gram Positive Rods  Preliminary Report (2023 01:19):    Growth in aerobic bottle: Gram Positive Rods    Culture - Blood (collected 2023 22:30)  Source: .Blood Blood  Gram Stain (2023 01:18):    Growth in aerobic bottle: Gram Positive Rods  Preliminary Report (2023 01:18):    Growth in aerobic bottle: Gram Positive Rods    ***Blood Panel PCR results on this specimen are available    approximately 3 hours after the Gram stain result.***    Gram stain, PCR, and/or culture results may not always    correspond due to difference in methodologies.    ************************************************************    This PCR assay was performed by multiplex PCR. This    Assay tests for 66 bacterial and resistance gene targets.    Please refer to the [facility] test directory    at [email or web address] for details.  Organism: Blood Culture PCR (2023 03:40)  Organism: Blood Culture PCR (2023 03:40)        RADIOLOGY & ADDITIONAL TESTS:  Lab Results Reviewed   Imaging Reviewed  Electrocardiogram Reviewed

## 2023-06-13 NOTE — PROGRESS NOTE ADULT - ATTENDING COMMENTS
68 y/o M with a history notable for NSAH s/p OLT 7/2020 (on MMF + pred), multiple prior infections including MDRO UTIs, bacteremia, CMV viremia presenting with F and R sided back pain found to have L sided ureteral stone, and cornyebacterium bacteremia.   #GPC bacteremia  #s/p OLT  #CKD  #CMV  -unclear source (no signs of current skin breakdown), continue vanc, TTE  -Still high concern for UTI, continue ertapenem  -Transplant ID following  -f/u MRI spine  -Not on CNI given neurotoxicity, mTOR on hold for wound healing, may need to restart outpatient  -Continue MMF + pred  -Transplant hepatology following

## 2023-06-13 NOTE — PROGRESS NOTE ADULT - PROBLEM SELECTOR PLAN 3
POCUS with R hip effusion but low concern of sepsis  - CT A&P with unremarkable effusion  - f/u MRI C/T/L

## 2023-06-13 NOTE — PHYSICAL THERAPY INITIAL EVALUATION ADULT - GENERAL OBSERVATIONS, REHAB EVAL
Pt received semi-supine in bed in NAD, however, pt is in pain in R flank to R LE 8/10 in supine, +IV Lock. EZ=743. Pt AOx4, pleasant and cooperative, however, pt in pain and not wanting to attempt sitting EOB at this time.

## 2023-06-13 NOTE — PHYSICAL THERAPY INITIAL EVALUATION ADULT - ADDITIONAL COMMENTS
Pt states he lives in a pvt house with his adult children. Pt ambulates with a RW and uses a ramp to enter the home, he has a first floor setup

## 2023-06-13 NOTE — PROGRESS NOTE ADULT - PROBLEM SELECTOR PLAN 1
Presents with R flank pain radiating to the right side of the abdomen; UA + LE and +WBC  CTAP - 6 x 3 mm stone seen within the left distal ureter with L hydronephrosis; asymm urinary bladder thickening; L>R perinephric stranding;  - No intervention per urology  - pending blood and urine cultures  - for pain Morphine 5 (moderate)/ 10 (severe), Dilaudid 0.5 for severe breakthrough pain  - ID c/s this am  - medical management for stone  - MRI spine to evaluate for discitis.

## 2023-06-14 NOTE — PROGRESS NOTE ADULT - SUBJECTIVE AND OBJECTIVE BOX
PROGRESS NOTE:   Authored by Morgan Fairchild MD   Patient is a 67y old  Male who presents with a chief complaint of back pain (2023 17:13)      SUBJECTIVE / OVERNIGHT EVENTS:  No acute events overnight.     ADDITIONAL REVIEW OF SYSTEMS:    MEDICATIONS  (STANDING):  acetaminophen     Tablet .. 975 milliGRAM(s) Oral every 8 hours  acetaminophen   IVPB .. 1000 milliGRAM(s) IV Intermittent once  chlorhexidine 4% Liquid 1 Application(s) Topical daily  dextrose 5%. 1000 milliLiter(s) (50 mL/Hr) IV Continuous <Continuous>  dextrose 5%. 1000 milliLiter(s) (100 mL/Hr) IV Continuous <Continuous>  dextrose 50% Injectable 25 Gram(s) IV Push once  dextrose 50% Injectable 25 Gram(s) IV Push once  dextrose 50% Injectable 12.5 Gram(s) IV Push once  dextrose Oral Gel 15 Gram(s) Oral once  ertapenem  IVPB      ertapenem  IVPB 1000 milliGRAM(s) IV Intermittent every 24 hours  folic acid 1 milliGRAM(s) Oral daily  glucagon  Injectable 1 milliGRAM(s) IntraMuscular once  heparin   Injectable 5000 Unit(s) SubCutaneous every 8 hours  insulin glargine Injectable (LANTUS) 10 Unit(s) SubCutaneous at bedtime  insulin lispro (ADMELOG) corrective regimen sliding scale   SubCutaneous three times a day before meals  insulin lispro (ADMELOG) corrective regimen sliding scale   SubCutaneous at bedtime  lidocaine   4% Patch 3 Patch Transdermal every 24 hours  mycophenolate mofetil 500 milliGRAM(s) Oral two times a day  pantoprazole    Tablet 40 milliGRAM(s) Oral before breakfast  predniSONE   Tablet 10 milliGRAM(s) Oral daily  sertraline 100 milliGRAM(s) Oral daily  tamsulosin 0.4 milliGRAM(s) Oral at bedtime  valGANciclovir 450 milliGRAM(s) Oral daily  vancomycin  IVPB 1500 milliGRAM(s) IV Intermittent every 24 hours    MEDICATIONS  (PRN):      CAPILLARY BLOOD GLUCOSE      POCT Blood Glucose.: 124 mg/dL (2023 23:34)  POCT Blood Glucose.: 96 mg/dL (2023 22:42)  POCT Blood Glucose.: 127 mg/dL (2023 17:13)  POCT Blood Glucose.: 168 mg/dL (2023 13:07)  POCT Blood Glucose.: 112 mg/dL (2023 08:17)    I&O's Summary    2023 07:01  -  2023 07:00  --------------------------------------------------------  IN: 770 mL / OUT: 1100 mL / NET: -330 mL        PHYSICAL EXAM:  Vital Signs Last 24 Hrs  T(C): 36.8 (2023 04:10), Max: 37.3 (2023 23:40)  T(F): 98.2 (2023 04:10), Max: 99.1 (2023 23:40)  HR: 73 (2023 04:10) (73 - 76)  BP: 131/57 (2023 04:10) (131/57 - 139/60)  BP(mean): --  RR: 18 (2023 04:10) (18 - 18)  SpO2: 94% (2023 04:10) (94% - 99%)    Parameters below as of 2023 04:10  Patient On (Oxygen Delivery Method): room air          GENERAL: No apparent distress.   HEAD:  Atraumatic, Normocephalic  EYES: EOMI, PERRLA, conjunctiva and sclera clear  NECK: Supple, no lymphadenopathy, no elevated JVP  CHEST/LUNG: Clear to auscultation bilateral and symmetric; No wheezes, rales, or rhonchi  HEART: S1 and S2 normal. Regular rate and rhythm; No murmurs, rubs, or gallops  ABDOMEN: Soft, non-tender, non-distended; normal bowel sounds  EXTREMITIES:  2+ peripheral pulses b/l, No clubbing, cyanosis, or edema  NEUROLOGY: A&O x 3, RLE 3/5, LLE 4/5, CN intact.   SKIN: No rashes or lesions        LABS:                        10.3   7.76  )-----------( 237      ( 2023 07:11 )             32.6     06-13    137  |  103  |  41<H>  ----------------------------<  103<H>  4.2   |  19<L>  |  1.81<H>    Ca    9.2      2023 07:14  Phos  3.1     06-  Mg     1.9     06-    TPro  7.0  /  Alb  3.3  /  TBili  0.4  /  DBili  x   /  AST  24  /  ALT  52<H>  /  AlkPhos  173<H>            Urinalysis Basic - ( 2023 08:17 )    Color: Light Yellow / Appearance: Clear / S.046 / pH: x  Gluc: x / Ketone: Negative  / Bili: Negative / Urobili: Negative   Blood: x / Protein: 100 mg/dl / Nitrite: Negative   Leuk Esterase: Large / RBC: 67 /hpf /  /HPF   Sq Epi: x / Non Sq Epi: x / Bacteria: Negative        Culture - Blood (collected 2023 22:40)  Source: .Blood Blood  Gram Stain (2023 01:15):    Growth in aerobic bottle: Gram Positive Rods    Growth in anaerobic bottle: Gram Positive Rods  Final Report (2023 01:16):    Growth in aerobic bottle: Corynebacterium striatum group    "Susceptibilities not performed"    Growth in anaerobic bottle: Gram Positive Rods    Culture - Blood (collected 2023 22:30)  Source: .Blood Blood  Gram Stain (2023 01:18):    Growth in aerobic bottle: Gram Positive Rods  Final Report (2023 18:29):    Growth in aerobic bottle: Corynebacterium striatum group    "Susceptibilities not performed"    ***Blood Panel PCR results on this specimen are available    approximately 3 hours after the Gram stain result.***    Gram stain, PCR, and/or culture results may not always    correspond due to difference in methodologies.    ************************************************************    This PCR assay was performed by multiplex PCR. This    Assay tests for 66 bacterial and resistance gene targets.    Please refer [facility] test directory    at [email or web address] for details.  Organism: Blood Culture PCR (2023 18:29)  Organism: Blood Culture PCR (2023 18:29)    Culture - Urine (collected 2023 19:52)  Source: Clean Catch Clean Catch (Midstream)  Final Report (2023 15:53):    <10,000 CFU/mL Normal Urogenital Shantel        RADIOLOGY & ADDITIONAL TESTS:  Lab Results Reviewed   Imaging Reviewed  Electrocardiogram Reviewed

## 2023-06-14 NOTE — PROGRESS NOTE ADULT - ASSESSMENT
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 Found to have + corynebacterium in 2/4 cultures. MRI showing -

## 2023-06-14 NOTE — CHART NOTE - NSCHARTNOTEFT_GEN_A_CORE
67y Male pmh of obesity, IDDM, HFpEF, JEAN cirrhosis s/p liver transplant, nephrolithiasis x 1, passed organically years ago, presenting with RLQ, R mid abdominal/flank pain, R hip/back pain for the past week.   CT abd pelvis demonstrated stable appearing 6mm L distal stone. Has been afebrile on the floor w/o leukocytosis or other signs of progressive infection  --UA and UCx neg  --Pt w/ 2/2 Bcx Corynbacterium. Unclear source but unlikely to be urine given normal urine studies. Can rpt Bcx per ID  --In setting of clean urine, no fevers/WBC will defer on intervention for stone at this time with stent  -Trial of medical expulsive therapy  -Flomax 0.4mg qHS   -Senna, colace, miralax  -Aggressive hydration  -Strain urine for calculi  --IF THERE IS AN ACUTE CHANGE IN PATIENTS CONDITION (fevers, leukocytosis, AMS, MISA, etc) PLEASE REPAGE UROLOGY FOR EVAL FOR STENT  --Patient can f/u after dc for elective ureteroscopy planning with [facility]  [facility]    [address] [place] [address], [place] [address]  Phone: [phone]     urology to signoff. please repage with further questions  Available via Teams (Iam Morley) from 6a-6p, M-F. Please page [phone] for issues that arise on nights and weekends

## 2023-06-14 NOTE — CONSULT NOTE ADULT - ASSESSMENT
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  Central canal compromise from disc material and osteophytes greatest at C6-C7 where broad right central disc protrusion flattens the right ventral cord surface, absent cord signal intensity change. Cervical neural foramina narrowed by disc material and osteophytes greatest to the left at C3-C4, to the left at C5-C6 and to the right at C6-C7 at least moderate to severe in magnitude at each of these locations.     MRI T-spine - Mild thoracic degenerative disc disease    MRI L-spine - L4-L5: Diffuse disc bulge is superimposed lobulated left central and subarticular disc protrusion. Herniated disc material further deforms the ventral thecal sac and extends downward in the canal at the L5 superior endplate. This extends into the left ventral lateral recess likely contacting the exiting left L5 nerve root. Disc material and facet arthropathy results in mild to moderate right and moderate to severe left foraminal compromise. L5-S1:   Diffuse disc bulge and prominent facet arthropathy with the vertebral malalignment contribute to moderate to severe bilateral foraminal compromise.     Current out- patient pain regimen: None  Out Patient Pain Management provider: None    Pt w/ new onset, acute pain in various locations: Neck - posterior, midline neck pain, described as tightness w/ limited ROM side to side.  Non-radiating except w/ movement - after raising arms over head, upon lowering them, the pain radiates down B/L arms to forearm. When moving head side to side the pain radiates to right abdomen at old surgical incision site (described as pulling, sharp, severe). Also low back pain, midline, that radiates down RLE to calf. Pt does not wish to take opioids.    Asked by primary team to assist w/ pain management as pain uncontrolled and pt does not wish to take opioids (plus Codeine allergy).    Plan discussed with primary team:  Start Robaxin 750 mg every 8 hrs  Start Lyrica 25 mg every 8 hours (CrCl = 59.1)  Continue Lidoderm - apply one of the patches over old surgical scar on right abdomen  Can consider Tramadol if pain uncontrolled, however risk of serotonin syndrome w/ Sertraline, would need to check QTc, and pt prefers no opioids  Warm/cool packs for comfort  Incentive Spirometer  PT per primary team    Time spent on encounter:    55    Minutes    [facility]  [phone]

## 2023-06-14 NOTE — PROGRESS NOTE ADULT - ATTENDING COMMENTS
66 y/o M with a history notable for NSAH s/p OLT 7/2020 (on MMF + pred), multiple prior infections including MDRO UTIs, bacteremia, CMV viremia presenting with F and R sided back pain found to have L sided ureteral stone, and cornyebacterium bacteremia.   #Corneybacterium bacteremia  #s/p OLT  #CKD  #CMV  -unclear source (no signs of current skin breakdown), continue vanc, TTE pending, MRI foot to r/o residual OM however site appears well healed  -On Ertapenem for UTI  -Transplant ID following  -MRI spine with cervical and lumbar DJD, attempting pain regimen alteration, limited given codeine allergy.  -Not on CNI given neurotoxicity, mTOR on hold for wound healing, may need to restart outpatient  -Continue MMF + pred  -Transplant hepatology following

## 2023-06-14 NOTE — PROGRESS NOTE ADULT - SUBJECTIVE AND OBJECTIVE BOX
Follow Up:    Bacteriemia    Interval History:  MRI of cervical, thoracic and lumbar spine with degenerative changes, no acute pathologies noted  Continues to have neck, back and RLE pain  Afebrile and no leukocytosis    REVIEW OF SYSTEMS  [  ] ROS unobtainable because:    [ x ] All other systems negative except as noted below    Constitutional:  [ ] fever [ ] chills  [ ] weight loss  [ ] weakness  Skin:  [ ] rash [ ] phlebitis	  Eyes: [ ] icterus [ ] pain  [ ] discharge	  ENMT: [ ] sore throat  [ ] thrush [ ] ulcers [ ] exudates  Respiratory: [ ] dyspnea [ ] hemoptysis [ ] cough [ ] sputum	  Cardiovascular:  [ ] chest pain [ ] palpitations [ ] edema	  Gastrointestinal:  [ ] nausea [ ] vomiting [ ] diarrhea [ ] constipation [ ] pain	  Genitourinary:  [ ] dysuria [ ] frequency [ ] hematuria [ ] discharge [ ] flank pain  [ ] incontinence  Musculoskeletal:  [ ] myalgias [ ] arthralgias [ ] arthritis  [x ] back pain, neck and RLE  Neurological:  [ ] headache [ ] seizures  [ ] confusion/altered mental status    Allergies  codeine (Anaphylaxis)        ANTIMICROBIALS:  ertapenem  IVPB    ertapenem  IVPB 1000 every 24 hours  valGANciclovir 450 daily  vancomycin  IVPB 1500 every 24 hours      OTHER MEDS:  MEDICATIONS  (STANDING):  acetaminophen     Tablet .. 975 every 8 hours  acetaminophen   IVPB .. 1000 once  dextrose 50% Injectable 25 once  dextrose 50% Injectable 12.5 once  dextrose 50% Injectable 25 once  dextrose Oral Gel 15 once  glucagon  Injectable 1 once  heparin   Injectable 5000 every 8 hours  insulin glargine Injectable (LANTUS) 10 at bedtime  insulin lispro (ADMELOG) corrective regimen sliding scale  at bedtime  insulin lispro (ADMELOG) corrective regimen sliding scale  three times a day before meals  mycophenolate mofetil 500 two times a day  pantoprazole    Tablet 40 before breakfast  predniSONE   Tablet 10 daily  sertraline 100 daily  tamsulosin 0.4 at bedtime      Vital Signs Last 24 Hrs  T(C): 36.6 (14 Jun 2023 08:45), Max: 37.3 (13 Jun 2023 23:40)  T(F): 97.9 (14 Jun 2023 08:45), Max: 99.1 (13 Jun 2023 23:40)  HR: 72 (14 Jun 2023 08:45) (72 - 76)  BP: 145/60 (14 Jun 2023 08:45) (131/57 - 145/60)  BP(mean): --  RR: 18 (14 Jun 2023 08:45) (18 - 18)  SpO2: 95% (14 Jun 2023 08:45) (94% - 95%)    Parameters below as of 14 Jun 2023 08:45  Patient On (Oxygen Delivery Method): room air        PHYSICAL EXAMINATION:  General: Alert and Awake, NAD  HEENT: PERRL, EOMI  Neck: Supple  Cardiac: RRR, No M/R/G  Resp: CTAB, No Wh/Rh/Ra  Abdomen: NBS, NT/ND, No HSM, No rigidity or guarding  MSK: No LE edema. No Calf tenderness  : No Beasley  Skin: No rashes or lesions. Skin is warm and dry to the touch.   Neuro: Alert and Awake. CN 2-12 Grossly intact. Moves all four extremities spontaneously.  Psych: Calm, Pleasant, Cooperative                          10.0   7.27  )-----------( 228      ( 14 Jun 2023 07:06 )             31.7       06-14    136  |  103  |  43<H>  ----------------------------<  110<H>  3.6   |  18<L>  |  1.88<H>    Ca    9.0      14 Jun 2023 07:06  Phos  3.0     06-14  Mg     2.0     06-14    TPro  6.9  /  Alb  3.1<L>  /  TBili  0.4  /  DBili  x   /  AST  20  /  ALT  47<H>  /  AlkPhos  179<H>  06-14          MICROBIOLOGY:  Vancomycin Level, Trough: 8.8 ug/mL (06-13-23 @ 23:10)  v  .Blood Blood  06-11-23   Growth in aerobic bottle: Corynebacterium striatum group  "Susceptibilities not performed"  Growth in anaerobic bottle: Gram Positive Rods  --    Growth in aerobic bottle: Gram Positive Rods  Growth in anaerobic bottle: Gram Positive Rods      .Blood Blood  06-11-23   Growth in aerobic bottle: Corynebacterium striatum group  "Susceptibilities not performed"  Growth in anaerobic bottle: Gram Positive Rods  ***Blood Panel PCR results on this specimen are available  approximately 3 hours after the Gram stain result.***  Gram stain, PCR, and/or culture results may not always  correspond due to difference in methodologies.  ************************************************************  This PCR assay was performed by multiplex PCR. This  Assay tests for 66 bacterial and resistance gene targets.  Please refer to the [facility] test directory  at [email or web address] for details.  --  Blood Culture PCR      Clean Catch Clean Catch (Midstream)  06-11-23   <10,000 CFU/mL Normal Urogenital Shantel  --  --          Rapid RVP Result: NotDetec (06-13 @ 09:00)  CMVPCR Log: NotDetec Zou97VA/mL (06-13 @ 07:13)        RADIOLOGY:    <The imaging below has been reviewed and visualized by me independently. Findings as detailed in report below>    < from: MR Cervical Spine No Cont (06.13.23 @ 22:30) >  IMPRESSION:    1. CERVICAL SPINE:   Moderate cervical degenerative disc disease appears   unchanged from June 2022. Mild prevertebral infiltration anterior to the   lower cervical spine appears unchanged from June 2022    2. THORACIC SPINE:   Mild thoracic degenerative disc disease    3. LUMBAR SPINE:   Moderate lumbar degenerative disc disease appears   unchanged from June 2022    4. No MR imaging findings strongly suspicious for septic discitis. If   symptoms persist or warrant, supplemental gadolinium-enhanced imaging may   be considered    --- End of Report ---    < end of copied text >

## 2023-06-14 NOTE — CONSULT NOTE ADULT - SUBJECTIVE AND OBJECTIVE BOX
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 Pt states this pain began ~2 days ago, Denies recent trauma, travel, infections, or sick contacts. He denies N/F/V/C, CP, SOB, diarrhea, constipation, melena/ hematochezia, dysuria, and hematuria. State the R back/ flank pain moves around to the right side of the abdomen. The hip pain does not move to groin or down the extremity. States both these pains worsen with movement. Pt also reports L neck pain moving to shoulder; states it has been difficult to rotate head. Reports fatigue few weeks prior to onset of symptoms. Due to pain, he has addison unable to get out of bed. States pain mildly improves with Tylenol. Pt has not had any recent medication changes. In the ED- Tmax 100,2, SBP 130s, HR 70- 80  Labs- WBC 7.64, Hgb 11.2, Plt 254, BUN 44, Cr 1.82, VBG- 7.35/37/33/20 lactate 1.0, UA + LE +  [id number]  Imaging  -CTAP 6 x 3 mm stone seen within the left distal ureter with L hydronephrosis; asymm urinary bladder thickening; L>R perinephric stranding; possible distal esophagitis; trace pelvic fluid with right pelvic surgical clip  -ED POCUS: right hip effusion  Meds- CTX (11pm), Vanc (1am), Fentanly 25, NS bolus 250 (12 Jun 2023 05:38)    Current out- patient pain regimen: None    Out Patient Pain Management provider: None    [facility] Prescription Monitoring Program: Reference #[id number]    Opioid Risk Tool (ORT-OUD) Score: Low    Pain Score: 7/10    Pt seen lying in bed, c/o new onset, acute pain x 1 week in various locations: Neck - posterior, midline neck pain, described as tightness w/ limited ROM side to side.  Non-radiating except w/ movement - after raising arms over head, upon lowering them, the pain radiates down B/L arms to forearm. When moving head side to side the pain radiates to right abdomen at old surgical incision site (described as pulling, sharp, severe). Also low back pain, midline, that radiates down RLE to calf. Pt does not wish to take opioids.    MRI C-spine -  paraspinal soft tissues demonstrates mild infiltration and tiny pockets of fluid anterior to the lower cervical levels particularly C5 and C6, similar to 2022.  Central canal compromise from disc material and osteophytes is again noted to be greatest at C6-C7 where broad right central disc protrusion flattens the right ventral cord surface, absent cord signal intensity change. Cervical neural foramina appear narrowed by disc material and osteophytes. This degenerative foraminal compromise is again noted to be greatest to the left at C3-C4, to the left at C5-C6 and to the right at C6-C7 at least moderate to severe in magnitude at each of these locations.     MRI T-spine - Mild thoracic degenerative disc disease    MRI L-spine - L4-L5: Diffuse disc bulge is superimposed lobulated left central and subarticular disc protrusion. Herniated disc material further deforms the ventral thecal sac and extends downward in the canal at the L5 superior endplate. This extends into the left ventral lateral recess likely contacting the exiting left L5 nerve root. Disc material and facet arthropathy results in mild to moderate right and moderate to severe left foraminal compromise. L5-S1:   Diffuse disc bulge and prominent facet arthropathy with the vertebral malalignment contribute to moderate to severe bilateral foraminal compromise.       REVIEW OF SYSTEMS:  CONSTITUTIONAL: No weight loss, falls  NEURO: No headaches, memory loss, loss of strength, tremors, dizziness or blurred vision  GI: (+) right sided abdominal pain as above, No nausea, vomiting, diarrhea, constipation, incontinence  : No urinary incontinence/retention  MSK: No joint pain or swelling; No upper or lower motor strength weakness  SKIN: No itching, burning, rashes, or lesions   PSYCHIATRIC: No depression, anxiety, mood swings, or difficulty sleeping      PHYSICAL EXAM  GENERAL: Seen at bedside, NAD, well-groomed, well-developed, appears stated age, no signs of toxicity  NEURO: Alert & Oriented X3, Good concentration; Follows commands; sensory exam decreased B/L LE R worse than left   HEENT: Head atraumatic, normocephalic; speech clear and fluent  GI: Appetite fair, abdomen soft, no tenderness on palpation,  last BM yesterday  : Voiding   EXTREMITIES: 2+ Peripheral Pulses, moving all extremities with some difficulty 2/2 pain and ?deconditioning  MSK: Motor Strength 4/5 B/L upper and lower extremities; (+) paravertebral tenderness left of posterior C-spine; no muscle spasm palpated but suspected; ambulates w/ walker 2/2 foot - right TMA  SKIN: No rashes or lesions  PSYCH: affect normal; good eye contact; no signs of depression or anxiety    PAST MEDICAL & SURGICAL HISTORY:  Diabetes      Hypercholesteremia      Neuropathy      Hypertension      Renal stones  25 years ago      Fatty liver disease, nonalcoholic      Obesity      Hepatic encephalopathy      GERD with esophagitis  Gastritis & Non Bleeding Ulcers      GIB (gastrointestinal bleeding)      S/P cholecystectomy          FAMILY HISTORY:  Family history of stomach cancer    Family history of hypertension    Family history of type 2 diabetes mellitus        Allergies    codeine (Anaphylaxis)      MEDICATIONS  (STANDING):  acetaminophen     Tablet .. 975 milliGRAM(s) Oral every 8 hours  acetaminophen   IVPB .. 1000 milliGRAM(s) IV Intermittent once  chlorhexidine 4% Liquid 1 Application(s) Topical daily  dextrose 5%. 1000 milliLiter(s) (100 mL/Hr) IV Continuous <Continuous>  dextrose 5%. 1000 milliLiter(s) (50 mL/Hr) IV Continuous <Continuous>  dextrose 50% Injectable 25 Gram(s) IV Push once  dextrose 50% Injectable 25 Gram(s) IV Push once  dextrose 50% Injectable 12.5 Gram(s) IV Push once  dextrose Oral Gel 15 Gram(s) Oral once  ertapenem  IVPB      ertapenem  IVPB 1000 milliGRAM(s) IV Intermittent every 24 hours  folic acid 1 milliGRAM(s) Oral daily  glucagon  Injectable 1 milliGRAM(s) IntraMuscular once  heparin   Injectable 5000 Unit(s) SubCutaneous every 8 hours  insulin glargine Injectable (LANTUS) 10 Unit(s) SubCutaneous at bedtime  insulin lispro (ADMELOG) corrective regimen sliding scale   SubCutaneous at bedtime  insulin lispro (ADMELOG) corrective regimen sliding scale   SubCutaneous three times a day before meals  lidocaine   4% Patch 3 Patch Transdermal every 24 hours  mycophenolate mofetil 500 milliGRAM(s) Oral two times a day  pantoprazole    Tablet 40 milliGRAM(s) Oral before breakfast  predniSONE   Tablet 10 milliGRAM(s) Oral daily  sertraline 100 milliGRAM(s) Oral daily  tamsulosin 0.4 milliGRAM(s) Oral at bedtime  valGANciclovir 450 milliGRAM(s) Oral daily  vancomycin  IVPB 1500 milliGRAM(s) IV Intermittent every 24 hours    MEDICATIONS  (PRN):      Vital Signs:  T(C): 36.8 (06-14-23 @ 14:32)  HR: 72 (06-14-23 @ 14:32)  BP: 115/63 (06-14-23 @ 14:32)  RR: 18 (06-14-23 @ 14:32)  SpO2: 96% (06-14-23 @ 14:32)    Pertinent labs/radiology:  Reviewed                          10.0   7.27  )-----------( 228      ( 14 Jun 2023 07:06 )             31.7       06-14    136  |  103  |  43<H>  ----------------------------<  110<H>  3.6   |  18<L>  |  1.88<H>    Ca    9.0      14 Jun 2023 07:06  Phos  3.0     06-14  Mg     2.0     06-14    TPro  6.9  /  Alb  3.1<L>  /  TBili  0.4  /  DBili  x   /  AST  20  /  ALT  47<H>  /  AlkPhos  179<H>  06-14      < from: MR Cervical Spine No Cont (06.13.23 @ 22:30) >  FINDINGS:    CERVICAL SPINE    Cervical vertebral alignment again demonstrates shallow reversal of the   cervical lordosis apex at the C6 vertebral body.  Cervical vertebral body   heights are maintained.  Marrow signal intensity within cervical   vertebral bodies demonstrates degenerative endplate changes including   small marginal osteophytes and endplate irregularity most prominently at   these lower cervical intervertebral disc levels.  No osseous expansion or    epidural disease is found.  The paraspinal soft tissues demonstrates   mild infiltration and tiny pockets of fluid anterior to the lower   cervical levels particularly C5 and C6, similar to 2022.    Cervical intervertebral discs again demonstrate degenerative signal   intensity loss on the long TR images. Degenerative disc height loss   appears greatest at C5-C6 and C6-C7. Central canal compromise from disc   material and osteophytes is again noted to be greatest at C6-C7 where   broad right central disc protrusion flattens the right ventral cord   surface, absent cord signal intensity change. At the remaining cervical   intervertebral disc levels no high-grade central canal compromise has   developed.  Cervical neural foramina appear narrowed by disc material and   osteophytes. This degenerative foraminal compromise is again noted to be   greatest to the left at C3-C4, to the left at C5-C6 and to the right at   C6-C7 at least moderate to severe in magnitude at each of these   locations. There is lesser involvement of the contralateral remaining   cervical neural foramina. This degenerative disc disease appears   unchanged from MR 6/17/2022. See that report for further detail.    Cervical cord morphology demonstrates direct compromise by extrinsic disc   material as described above.    No cord signal intensity change is seen.    No intrinsic cord lesion is found.  No cord expansion or cord volume loss   is found.    The cervicomedullary junction is intact.    THORACIC SPINE    Thoracic vertebral alignment demonstrates idiopathic scoliosis that is   dextroconvex in the lower thoracic spine.  Thoracic vertebral body   heights are maintained.  Marrow signalintensity within thoracic   vertebral bodies is significant for degenerative endplate changes   including marginal osteophytes and endplate irregularity most pronounced   at the lower thoracic intervertebral disc levels.  No osseous expansion   or  epidural disease is found.  The paraspinal soft tissues appear intact.    Thoracic intervertebral discs demonstrate degenerative disc height loss   and signal intensity loss on the long TR images greatest in the mid   thoracic intervertebral disc levels. At T3-T4 focal right central disc   protrusion mildly deforms the right ventral cord surface. There is a   lesser degree of ventral canal compromise from disc material and   osteophytes at additional mid and lower thoracic intervertebral disc   levels. At these levels no high-grade degenerative central canal   compromise is recognized. Posterior canal osteophytes are noted at the T8   and T10 inferior endplate levels. These mildly deform the dorsal thecal   sac without direct cord compromise. Thoracic neural foramen appear patent.    The thoracic cord maintains intact morphology. No cord signal intensity   change is seen. No intrinsic cord lesion is found. No cord expansion or   cord volume loss is found.    LUMBAR SPINE    LUMBAR VERTEBRA AND ALIGNMENT:  Lumbar vertebral alignment demonstrates idiopathic scoliosis that is   levoconvex near the thoracic lumbar junction. There is also grade 1   anterior listhesis of L5 on S1. Spondylolysis defects are absent. Lumbar   vertebral body heights are maintained.  Marrow signal intensity within   lumbar vertebra is significant for degenerative endplate changes   including small marginal osteophytes endplate irregularity and shallow   Schmorl's nodes. Within the L5 vertebral body there is a lobulated focal   lesion hyperintense on the T1-weighted T2-weighted images, hypointense on   STIR images, typical for hemangioma of bone. No osseous expansion or   epidural disease is identified.    SACRUM/VISUALIZED PELVIC BONES:   The sacrum and visualized pelvic bones   appear intact.  SACROILIAC JOINTS:  The sacroiliac joints are incompletely visualized, as   seen intact.    PARASPINAL SOFT TISSUES:    No lesion is identified.  VISUALIZED ABDOMINAL AND PELVIC SOFT TISSUES:   No lesion is identified.    LUMBAR INTERVERTEBRAL DISC LEVELS:    General comments:   Lumbar intervertebral discs demonstrate variable disc   degeneration. Degenerative disc height loss appears greatest at L4-L5.   Degenerative signal intensity loss in the long TR images is found most   prominently at the lower lumbar levels.    T12-L1:   Shallow left central disc protrusion deforms the ventral thecal   sac without compromise of the conus. Foramina remain patent.    L1-L2:    No central canal or foraminal stenosis.  L2-L3:    No central canal or foraminal stenosis.    L3-L4:    Left foraminal and lateral disc protrusion extends   predominantly peripheral to the left neural foramen. The right neural   foramen is mildly narrowed by disc bulging and facet arthropathy. The   central canal remains intact.    L4-L5:    Diffuse disc bulge is superimposed lobulated left central and   subarticular disc protrusion. Herniated disc material further deforms the   ventral thecal sac and extends downward in the canal at the L5 superior   endplate. This extends into the left ventral lateral recess likely   contacting the exiting left L5 nerve root. Disc material and facet   arthropathy results in mild to moderate right and moderate to severe left   foraminal compromise. The thecal sac is narrowed by disc material and   facet arthropathy although adequate CSF remains interposed between   individual nerve roots of cauda equina without critical central canal   stenosis.    L5-S1:   Diffuse disc bulge and prominent facet arthropathy with the   vertebral malalignment contribute to moderate to severe bilateral   foraminal compromise. The central canal remains patent.    CNS STRUCTURES:  The distal cord maintains intact morphology and signal intensity.  The   conus is  normally positioned at T12.   Nerve roots of the cauda equina   appear intact.      IMPRESSION:    1. CERVICAL SPINE:   Moderate cervical degenerative disc disease appears   unchanged from June 2022. Mild prevertebral infiltration anterior to the   lower cervical spine appears unchanged from June 2022    2. THORACIC SPINE:   Mild thoracic degenerative disc disease    3. LUMBAR SPINE:   Moderate lumbar degenerative disc disease appears   unchanged from June 2022    4. No MR imaging findings strongly suspicious for septic discitis. If   symptoms persist or warrant, supplemental gadolinium-enhanced imaging may   be considered    --- End of Report ---    < end of copied text >

## 2023-06-14 NOTE — PROGRESS NOTE ADULT - ASSESSMENT
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 Pt states this pain began ~2 days ago, Denies recent trauma, travel, infections, or sick contacts. He denies N/F/V/C, CP, SOB, diarrhea, constipation, melena/ hematochezia, dysuria, and hematuria. State the R back/ flank pain moves around to the right side of the abdomen. The hip pain does not move to groin or down the extremity. States both these pains worsen with movement. Pt also reports L neck pain moving to shoulder; states it has been difficult to rotate head. Reports fatigue few weeks prior to onset of symptoms. Due to pain, he has addison unable to get out of bed. States pain mildly improves with Tylenol. Pt has not had any recent medication changes.     In the ED- Tmax 100,2, SBP 130s, HR 70- 80  Labs- WBC 7.64, Hgb 11.2, Plt 254, BUN 44, Cr 1.82, VBG- 7.35/37/33/20 lactate 1.0, UA + LE +  [id number]  Imaging  -CTAP 6 x 3 mm stone seen within the left distal ureter with L hydronephrosis; asymm urinary bladder thickening; L>R perinephric stranding; possible distal esophagitis; trace pelvic fluid with right pelvic surgical clip  -ED POCUS: right hip effusion  Meds- CTX (11pm), Vanc (1am), Fentanly 25, NS bolus 250 (12 Jun 2023 05:38)      #hydronephrosis, back pain, Right hip effusion  --UA (6/11, 6/12) - Large leukocyte esterase, 279 WBC, no bacteria or nitrite   --Afebrile - curve temperatures   --No leukocytosis - trend CBC  --MRSA/MSSA Nares - negative  --CT A/P (6/11) - 6X3 mm stone within left ureter  with left hydronephrosis  --RVP negative  --Blood culture (6/12) - +GNR, +pcr corynebacterium species  --Urine Cx (6/11) - Normal urogenital ninfa  --MRI of Thoracic, cervical and lumbar spine:  No MR imaging findings strongly suspicious for septic discitis.    Recommendations:  --Continue Vancomycin 1250 mg Q24 hr, check trough pre 3rd, and Ertapenem 1 gm Q24 hr. Considering foot abscess from 01/22/23 was positive for Klebsiella PNA, bacteroides thetaiotamicron actinomyces odontolyticus strep mitis/oralis and strep agalactiae.   --Follow blood culture  --image with CT or MRI the left foot TMA site to look for residual osteo

## 2023-06-15 NOTE — PROGRESS NOTE ADULT - SUBJECTIVE AND OBJECTIVE BOX
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  Central canal compromise from disc material and osteophytes greatest at C6-C7 where broad right central disc protrusion flattens the right ventral cord surface, absent cord signal intensity change. Cervical neural foramina narrowed by disc material and osteophytes greatest to the left at C3-C4, to the left at C5-C6 and to the right at C6-C7 at least moderate to severe in magnitude at each of these locations.   MRI L-spine - L4-L5: Diffuse disc bulge is superimposed lobulated left central and subarticular disc protrusion. Herniated disc material further deforms the ventral thecal sac and extends downward in the canal at the L5 superior endplate. This extends into the left ventral lateral recess likely contacting the exiting left L5 nerve root. Disc material and facet arthropathy results in mild to moderate right and moderate to severe left foraminal compromise. L5-S1:   Diffuse disc bulge and prominent facet arthropathy with the vertebral malalignment contribute to moderate to severe bilateral foraminal compromise.     Current out- patient pain regimen: None  Out Patient Pain Management provider: None    Pt seen lying in bed, reports improvement in back and RLE pain but not enough and still cannot get out of bed. Posterior, midline neck pain (described as tightness w/ limited ROM side to side) persists.  Pt also w/ pain right abdomen at old surgical incision site (pulling, sharp). Pt does not wish to take opioids. Pt is A&Ox3 but speech is slurred and not as clear-headed as yesterday, pt states he has been "hallucinating all morning" yet had not yet received a Dilaudid dose.    PHYSICAL EXAM  GENERAL: Seen at bedside, NAD, well-groomed, well-developed, appears stated age  NEURO: Alert & Oriented X3, Good concentration; Follows commands   HEENT: Head atraumatic, normocephalic; speech slurred  GI: Appetite fair, abdomen soft, no tenderness on palpation, (+)  : Voiding   EXTREMITIES: moving all extremities with some difficulty 2/2 pain and ?deconditioning  SKIN: No rashes or lesions  PSYCH: affect normal; good eye contact; no signs of depression or anxiety    T(C): 37.1 (06-15-23 @ 12:53)  HR: 65 (06-15-23 @ 12:53)  BP: 130/59 (06-15-23 @ 12:53)  RR: 18 (06-15-23 @ 12:53)  SpO2: 96% (06-15-23 @ 12:53)      MEDICATIONS  (STANDING):  acetaminophen     Tablet .. 975 milliGRAM(s) Oral every 8 hours  acetaminophen   IVPB .. 1000 milliGRAM(s) IV Intermittent once  ALPRAZolam 0.5 milliGRAM(s) Oral once  chlorhexidine 4% Liquid 1 Application(s) Topical daily  dextrose 5%. 1000 milliLiter(s) (100 mL/Hr) IV Continuous <Continuous>  dextrose 5%. 1000 milliLiter(s) (50 mL/Hr) IV Continuous <Continuous>  dextrose 50% Injectable 25 Gram(s) IV Push once  dextrose 50% Injectable 12.5 Gram(s) IV Push once  dextrose 50% Injectable 25 Gram(s) IV Push once  dextrose Oral Gel 15 Gram(s) Oral once  ertapenem  IVPB      ertapenem  IVPB 1000 milliGRAM(s) IV Intermittent every 24 hours  folic acid 1 milliGRAM(s) Oral daily  glucagon  Injectable 1 milliGRAM(s) IntraMuscular once  heparin   Injectable 5000 Unit(s) SubCutaneous every 8 hours  insulin glargine Injectable (LANTUS) 10 Unit(s) SubCutaneous at bedtime  insulin lispro (ADMELOG) corrective regimen sliding scale   SubCutaneous three times a day before meals  insulin lispro (ADMELOG) corrective regimen sliding scale   SubCutaneous at bedtime  lidocaine   4% Patch 3 Patch Transdermal every 24 hours  methocarbamol 750 milliGRAM(s) Oral every 6 hours  mycophenolate mofetil 500 milliGRAM(s) Oral two times a day  pantoprazole    Tablet 40 milliGRAM(s) Oral before breakfast  predniSONE   Tablet 10 milliGRAM(s) Oral daily  pregabalin 50 milliGRAM(s) Oral at bedtime  sertraline 100 milliGRAM(s) Oral daily  tamsulosin 0.4 milliGRAM(s) Oral at bedtime  valGANciclovir 450 milliGRAM(s) Oral daily  vancomycin  IVPB 1500 milliGRAM(s) IV Intermittent every 24 hours    MEDICATIONS  (PRN):  HYDROmorphone   Solution 1 milliGRAM(s) Oral every 4 hours PRN Severe Pain (7 - 10)      Allergies    codeine (Anaphylaxis)        Pertinent labs, radiology, additional studies:  Reviewed                          9.6    6.88  )-----------( 220      ( 15 Rachid 2023 06:55 )             29.3       06-15    134<L>  |  102  |  43<H>  ----------------------------<  157<H>  3.9   |  19<L>  |  1.94<H>    Ca    8.9      15 Rachid 2023 06:49  Phos  2.9     06-15  Mg     2.0     06-15    TPro  6.9  /  Alb  3.1<L>  /  TBili  0.4  /  DBili  x   /  AST  20  /  ALT  47<H>  /  AlkPhos  179<H>  06-14      < from: MR Cervical Spine No Cont (06.13.23 @ 22:30) >  FINDINGS:    CERVICAL SPINE    Cervical vertebral alignment again demonstrates shallow reversal of the   cervical lordosis apex at the C6 vertebral body.  Cervical vertebral body   heights are maintained.  Marrow signal intensity within cervical   vertebral bodies demonstrates degenerative endplate changes including   small marginal osteophytes and endplate irregularity most prominently at   these lower cervical intervertebral disc levels.  No osseous expansion or    epidural disease is found.  The paraspinal soft tissues demonstrates   mild infiltration and tiny pockets of fluid anterior to the lower   cervical levels particularly C5 and C6, similar to 2022.    Cervical intervertebral discs again demonstrate degenerative signal   intensity loss on the long TR images. Degenerative disc height loss   appears greatest at C5-C6 and C6-C7. Central canal compromise from disc   material and osteophytes is again noted to be greatest at C6-C7 where   broad right central disc protrusion flattens the right ventral cord   surface, absent cord signal intensity change. At the remaining cervical   intervertebral disc levels no high-grade central canal compromise has   developed.  Cervical neural foramina appear narrowed by disc material and   osteophytes. This degenerative foraminal compromise is again noted to be   greatest to the left at C3-C4, to the left at C5-C6 and to the right at   C6-C7 at least moderate to severe in magnitude at each of these   locations. There is lesser involvement of the contralateral remaining   cervical neural foramina. This degenerative disc disease appears   unchanged from MR 6/17/2022. See that report for further detail.    Cervical cord morphology demonstrates direct compromise by extrinsic disc   material as described above.    No cord signal intensity change is seen.    No intrinsic cord lesion is found.  No cord expansion or cord volume loss   is found.    The cervicomedullary junction is intact.    THORACIC SPINE    Thoracic vertebral alignment demonstrates idiopathic scoliosis that is   dextroconvex in the lower thoracic spine.  Thoracic vertebral body   heights are maintained.  Marrow signalintensity within thoracic   vertebral bodies is significant for degenerative endplate changes   including marginal osteophytes and endplate irregularity most pronounced   at the lower thoracic intervertebral disc levels.  No osseous expansion   or  epidural disease is found.  The paraspinal soft tissues appear intact.    Thoracic intervertebral discs demonstrate degenerative disc height loss   and signal intensity loss on the long TR images greatest in the mid   thoracic intervertebral disc levels. At T3-T4 focal right central disc   protrusion mildly deforms the right ventral cord surface. There is a   lesser degree of ventral canal compromise from disc material and   osteophytes at additional mid and lower thoracic intervertebral disc   levels. At these levels no high-grade degenerative central canal   compromise is recognized. Posterior canal osteophytes are noted at the T8   and T10 inferior endplate levels. These mildly deform the dorsal thecal   sac without direct cord compromise. Thoracic neural foramen appear patent.    The thoracic cord maintains intact morphology. No cord signal intensity   change is seen. No intrinsic cord lesion is found. No cord expansion or   cord volume loss is found.    LUMBAR SPINE    LUMBAR VERTEBRA AND ALIGNMENT:  Lumbar vertebral alignment demonstrates idiopathic scoliosis that is   levoconvex near the thoracic lumbar junction. There is also grade 1   anterior listhesis of L5 on S1. Spondylolysis defects are absent. Lumbar   vertebral body heights are maintained.  Marrow signal intensity within   lumbar vertebra is significant for degenerative endplate changes   including small marginal osteophytes endplate irregularity and shallow   Schmorl's nodes. Within the L5 vertebral body there is a lobulated focal   lesion hyperintense on the T1-weighted T2-weighted images, hypointense on   STIR images, typical for hemangioma of bone. No osseous expansion or   epidural disease is identified.    SACRUM/VISUALIZED PELVIC BONES:   The sacrum and visualized pelvic bones   appear intact.  SACROILIAC JOINTS:  The sacroiliac joints are incompletely visualized, as   seen intact.    PARASPINAL SOFT TISSUES:    No lesion is identified.  VISUALIZED ABDOMINAL AND PELVIC SOFT TISSUES:   No lesion is identified.    LUMBAR INTERVERTEBRAL DISC LEVELS:    General comments:   Lumbar intervertebral discs demonstrate variable disc   degeneration. Degenerative disc height loss appears greatest at L4-L5.   Degenerative signal intensity loss in the long TR images is found most   prominently at the lower lumbar levels.    T12-L1:   Shallow left central disc protrusion deforms the ventral thecal   sac without compromise of the conus. Foramina remain patent.    L1-L2:    No central canal or foraminal stenosis.  L2-L3:    No central canal or foraminal stenosis.    L3-L4:    Left foraminal and lateral disc protrusion extends   predominantly peripheral to the left neural foramen. The right neural   foramen is mildly narrowed by disc bulging and facet arthropathy. The   central canal remains intact.    L4-L5:    Diffuse disc bulge is superimposed lobulated left central and   subarticular disc protrusion. Herniated disc material further deforms the   ventral thecal sac and extends downward in the canal at the L5 superior   endplate. This extends into the left ventral lateral recess likely   contacting the exiting left L5 nerve root. Disc material and facet   arthropathy results in mild to moderate right and moderate to severe left   foraminal compromise. The thecal sac is narrowed by disc material and   facet arthropathy although adequate CSF remains interposed between   individual nerve roots of cauda equina without critical central canal   stenosis.    L5-S1:   Diffuse disc bulge and prominent facet arthropathy with the   vertebral malalignment contribute to moderate to severe bilateral   foraminal compromise. The central canal remains patent.    CNS STRUCTURES:  The distal cord maintains intact morphology and signal intensity.  The   conus is  normally positioned at T12.   Nerve roots of the cauda equina   appear intact.      IMPRESSION:    1. CERVICAL SPINE:   Moderate cervical degenerative disc disease appears   unchanged from June 2022. Mild prevertebral infiltration anterior to the   lower cervical spine appears unchanged from June 2022    2. THORACIC SPINE:   Mild thoracic degenerative disc disease    3. LUMBAR SPINE:   Moderate lumbar degenerative disc disease appears   unchanged from June 2022    4. No MR imaging findings strongly suspicious for septic discitis. If   symptoms persist or warrant, supplemental gadolinium-enhanced imaging may   be considered    --- End of Report ---

## 2023-06-15 NOTE — PROGRESS NOTE ADULT - ASSESSMENT
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  Central canal compromise from disc material and osteophytes greatest at C6-C7 where broad right central disc protrusion flattens the right ventral cord surface, absent cord signal intensity change. Cervical neural foramina narrowed by disc material and osteophytes greatest to the left at C3-C4, to the left at C5-C6 and to the right at C6-C7 at least moderate to severe in magnitude at each of these locations.   MRI L-spine - L4-L5: Diffuse disc bulge is superimposed lobulated left central and subarticular disc protrusion. Herniated disc material further deforms the ventral thecal sac and extends downward in the canal at the L5 superior endplate. This extends into the left ventral lateral recess likely contacting the exiting left L5 nerve root. Disc material and facet arthropathy results in mild to moderate right and moderate to severe left foraminal compromise. L5-S1:   Diffuse disc bulge and prominent facet arthropathy with the vertebral malalignment contribute to moderate to severe bilateral foraminal compromise.     Current out- patient pain regimen: None  Out Patient Pain Management provider: None    Pt seen lying in bed, reports improvement in back and RLE pain but not enough and still cannot get out of bed. Posterior, midline neck pain (described as tightness w/ limited ROM side to side) persists.  Pt also w/ pain right abdomen at old surgical incision site (pulling, sharp). Pt does not wish to take opioids. Pt is A&Ox3 but speech is slurred and not as clear-headed as yesterday, pt states he has been "hallucinating all morning" yet had not yet received a Dilaudid dose.    Plan discussed with primary team:  Hydromorphone Solution 1 mg every 4 hours PRN severe pain  Increase Robaxin 750 mg to every 6 hrs  Change Lyrica 25 mg to QHS (CrCl = 57.3)  Continue Lidoderm - apply one of the patches over old surgical scar on right abdomen  Can consider Tramadol if Dilaudid too strong/adverse effects, however risk of serotonin syndrome w/ Sertraline (consider short course), would also need to check QTc and Na level as Tramadol can worsen both  Warm/cool packs for comfort  Incentive Spirometer  PT per primary team    Time spent on encounter:    30   Minutes    [facility]  [phone]

## 2023-06-15 NOTE — PROGRESS NOTE ADULT - PROBLEM SELECTOR PLAN 2
BCx positive for corynebacterium  - c/w vancomycin  - c/w ertapenem  - repeat BCx  - TTE - no evidence of endocarditis, aortic valve anatomy not evaluated.

## 2023-06-15 NOTE — PROGRESS NOTE ADULT - ASSESSMENT
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 Found to have + corynebacterium in 2/4 cultures. MRI showing - no evidence of OM/discitis, degenerative changes present. Pending MRI Foot. BCx 6/13 NGTD. TTE without evidence of osteomyelitis.  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 Found to have + corynebacterium in 2/4 cultures. MRI showing - no evidence of OM/discitis, degenerative changes present. Pending MRI Foot. BCx 6/13 NGTD. TTE without evidence of endocarditis/moderate AR/mildAS, normal LVEF.

## 2023-06-15 NOTE — PROGRESS NOTE ADULT - SUBJECTIVE AND OBJECTIVE BOX
PROGRESS NOTE:   Authored by Morgan Fairchild MD   Patient is a 67y old  Male who presents with a chief complaint of back pain (14 Jun 2023 17:39)      SUBJECTIVE / OVERNIGHT EVENTS:  No acute events overnight.     ADDITIONAL REVIEW OF SYSTEMS:    MEDICATIONS  (STANDING):  acetaminophen     Tablet .. 975 milliGRAM(s) Oral every 8 hours  acetaminophen   IVPB .. 1000 milliGRAM(s) IV Intermittent once  ALPRAZolam 0.5 milliGRAM(s) Oral once  chlorhexidine 4% Liquid 1 Application(s) Topical daily  dextrose 5%. 1000 milliLiter(s) (50 mL/Hr) IV Continuous <Continuous>  dextrose 5%. 1000 milliLiter(s) (100 mL/Hr) IV Continuous <Continuous>  dextrose 50% Injectable 25 Gram(s) IV Push once  dextrose 50% Injectable 12.5 Gram(s) IV Push once  dextrose 50% Injectable 25 Gram(s) IV Push once  dextrose Oral Gel 15 Gram(s) Oral once  ertapenem  IVPB      ertapenem  IVPB 1000 milliGRAM(s) IV Intermittent every 24 hours  folic acid 1 milliGRAM(s) Oral daily  glucagon  Injectable 1 milliGRAM(s) IntraMuscular once  heparin   Injectable 5000 Unit(s) SubCutaneous every 8 hours  insulin glargine Injectable (LANTUS) 10 Unit(s) SubCutaneous at bedtime  insulin lispro (ADMELOG) corrective regimen sliding scale   SubCutaneous at bedtime  insulin lispro (ADMELOG) corrective regimen sliding scale   SubCutaneous three times a day before meals  lidocaine   4% Patch 3 Patch Transdermal every 24 hours  methocarbamol 750 milliGRAM(s) Oral every 8 hours  mycophenolate mofetil 500 milliGRAM(s) Oral two times a day  pantoprazole    Tablet 40 milliGRAM(s) Oral before breakfast  predniSONE   Tablet 10 milliGRAM(s) Oral daily  pregabalin 25 milliGRAM(s) Oral every 8 hours  sertraline 100 milliGRAM(s) Oral daily  tamsulosin 0.4 milliGRAM(s) Oral at bedtime  valGANciclovir 450 milliGRAM(s) Oral daily  vancomycin  IVPB 1500 milliGRAM(s) IV Intermittent every 24 hours    MEDICATIONS  (PRN):      CAPILLARY BLOOD GLUCOSE      POCT Blood Glucose.: 152 mg/dL (14 Jun 2023 21:29)  POCT Blood Glucose.: 138 mg/dL (14 Jun 2023 17:12)  POCT Blood Glucose.: 170 mg/dL (14 Jun 2023 11:53)  POCT Blood Glucose.: 115 mg/dL (14 Jun 2023 07:38)    I&O's Summary    14 Jun 2023 07:01  -  15 Rachid 2023 07:00  --------------------------------------------------------  IN: 700 mL / OUT: 2600 mL / NET: -1900 mL        PHYSICAL EXAM:  Vital Signs Last 24 Hrs  T(C): 36.9 (15 Rachid 2023 03:18), Max: 37.3 (14 Jun 2023 21:27)  T(F): 98.5 (15 Rachid 2023 03:18), Max: 99.1 (14 Jun 2023 21:27)  HR: 74 (15 Rachid 2023 03:18) (72 - 77)  BP: 135/57 (15 Rachid 2023 03:18) (115/63 - 145/60)  BP(mean): --  RR: 18 (15 Rachid 2023 03:18) (18 - 18)  SpO2: 94% (15 Rachid 2023 03:18) (94% - 96%)    Parameters below as of 15 Rachid 2023 03:18  Patient On (Oxygen Delivery Method): room air        GENERAL: No apparent distress.   HEAD:  Atraumatic, Normocephalic  EYES: EOMI, PERRLA, conjunctiva and sclera clear  NECK: Supple, no lymphadenopathy, no elevated JVP  CHEST/LUNG: Clear to auscultation bilateral and symmetric; No wheezes, rales, or rhonchi  HEART: S1 and S2 normal. Regular rate and rhythm; No murmurs, rubs, or gallops  ABDOMEN: Soft, non-tender, non-distended; normal bowel sounds  EXTREMITIES:  2+ peripheral pulses b/l, No clubbing, cyanosis, or edema  NEUROLOGY: A&O x 3, RLE 3/5, LLE 4/5, CN intact.   SKIN: No rashes or lesions      LABS:                        10.0   7.27  )-----------( 228      ( 14 Jun 2023 07:06 )             31.7     06-14    136  |  103  |  43<H>  ----------------------------<  110<H>  3.6   |  18<L>  |  1.88<H>    Ca    9.0      14 Jun 2023 07:06  Phos  3.0     06-14  Mg     2.0     06-14    TPro  6.9  /  Alb  3.1<L>  /  TBili  0.4  /  DBili  x   /  AST  20  /  ALT  47<H>  /  AlkPhos  179<H>  06-14              Culture - Blood (collected 13 Jun 2023 18:00)  Source: .Blood Blood  Preliminary Report (14 Jun 2023 22:01):    No growth to date.        RADIOLOGY & ADDITIONAL TESTS:  Lab Results Reviewed   Imaging Reviewed  Electrocardiogram Reviewed

## 2023-06-15 NOTE — PROGRESS NOTE ADULT - ATTENDING COMMENTS
68 y/o M with a history notable for NSAH s/p OLT 7/2020 (on MMF + pred), multiple prior infections including MDRO UTIs, bacteremia, CMV viremia presenting with F and R sided back pain found to have L sided ureteral stone, and cornyebacterium bacteremia.   #Corneybacterium bacteremia  #s/p OLT  #CKD  #CMV  #Back Pain  -unclear source (no signs of current skin breakdown), TTE and MRI spine neg. pending MRI foot to r/o residual OM, continue vanc   -On Ertapenem for UTI  -Continue MMF + pred  -Transplant ID following  -MRI spine with cervical and lumbar DJD, attempting pain regimen alteration with pregabalin and methocarbamol with some improvement, codeine allergy

## 2023-06-16 NOTE — CONSULT NOTE ADULT - ASSESSMENT
Prashant Segundo  67M Hx DM/HFpEF/CKD/JEAN-cirrhosis s/p liver xplant, recent bacteremia w/RLE wound debridements p/f LBP and roughly stable since june 2022 MR w/C6/7 stenosis, L4/5 disc w/o significant stenosis, L5/S1 grade 1 spondy/disc w/b/l foraminal stenosis, no c/f osteo. Admits to radicular pain to b/l S2, denies paresthesias/numbness/saddle-anesthsia/incontinence  Exam: AOx2, BUE 5/5 throughout, BLE 5/5 throughout, SILT, neg haddad/clonus.   -trial of conservative management, pain control, PT  -outpatient follow up in 3 weeks with Dr. Mendiola

## 2023-06-16 NOTE — CONSULT NOTE ADULT - SUBJECTIVE AND OBJECTIVE BOX
p (1480)     HPI:67M Hx DM/HFpEF/CKD/JEAN-cirrhosis s/p liver xplant, recent bacteremia w/RLE wound debridements p/f LBP and roughly stable since june 2022 MR w/C6/7 stenosis, L4/5 disc w/o significant stenosis, L5/S1 grade 1 spondy/disc w/b/l foraminal stenosis, no c/f osteo. Admits to radicular pain to b/l S2, denies paresthesias/numbness/saddle-anesthsia/incontinence  Exam: AOx2, BUE 5/5 throughout, BLE 5/5 throughout, SILT, neg haddad/clonus.       --Anticoagulation:  heparin   Injectable 5000 Unit(s) SubCutaneous every 8 hours    =====================  PAST MEDICAL HISTORY   Diabetes    Hypercholesteremia    Neuropathy    Hypertension    Renal stones    Fatty liver disease, nonalcoholic    Obesity    Hepatic encephalopathy    GERD with esophagitis    GIB (gastrointestinal bleeding)      PAST SURGICAL HISTORY   No significant past surgical history    S/P cholecystectomy      codeine (Anaphylaxis)      MEDICATIONS:  Antibiotics:  ertapenem  IVPB      ertapenem  IVPB 1000 milliGRAM(s) IV Intermittent every 24 hours  valGANciclovir 450 milliGRAM(s) Oral daily  vancomycin  IVPB 1500 milliGRAM(s) IV Intermittent every 24 hours    Neuro:  acetaminophen     Tablet .. 975 milliGRAM(s) Oral every 8 hours  acetaminophen   IVPB .. 1000 milliGRAM(s) IV Intermittent once  ALPRAZolam 0.5 milliGRAM(s) Oral once  HYDROmorphone   Solution 1 milliGRAM(s) Oral every 4 hours PRN  methocarbamol 750 milliGRAM(s) Oral every 6 hours  sertraline 100 milliGRAM(s) Oral daily    Other:  dextrose 5%. 1000 milliLiter(s) IV Continuous <Continuous>  dextrose 5%. 1000 milliLiter(s) IV Continuous <Continuous>  dextrose 50% Injectable 25 Gram(s) IV Push once  dextrose 50% Injectable 12.5 Gram(s) IV Push once  dextrose 50% Injectable 25 Gram(s) IV Push once  dextrose Oral Gel 15 Gram(s) Oral once  folic acid 1 milliGRAM(s) Oral daily  glucagon  Injectable 1 milliGRAM(s) IntraMuscular once  insulin glargine Injectable (LANTUS) 10 Unit(s) SubCutaneous at bedtime  insulin lispro (ADMELOG) corrective regimen sliding scale   SubCutaneous three times a day before meals  insulin lispro (ADMELOG) corrective regimen sliding scale   SubCutaneous at bedtime  mycophenolate mofetil 500 milliGRAM(s) Oral two times a day  pantoprazole    Tablet 40 milliGRAM(s) Oral before breakfast  predniSONE   Tablet 10 milliGRAM(s) Oral daily  tamsulosin 0.4 milliGRAM(s) Oral at bedtime      SOCIAL HISTORY:   Occupation:   Marital Status:     FAMILY HISTORY:  No pertinent family history in first degree relatives    Family history of stomach cancer    Family history of hypertension    Family history of type 2 diabetes mellitus        ROS: Negative except per HPI    LABS:                          9.8    7.69  )-----------( 259      ( 16 Jun 2023 06:35 )             31.2     06-16    134<L>  |  101  |  45<H>  ----------------------------<  137<H>  3.8   |  22  |  2.03<H>    Ca    9.2      16 Jun 2023 06:31  Phos  3.3     06-16  Mg     2.1     06-16    TPro  7.2  /  Alb  3.1<L>  /  TBili  0.2  /  DBili  x   /  AST  12  /  ALT  31  /  AlkPhos  164<H>  06-16

## 2023-06-16 NOTE — PROGRESS NOTE ADULT - PROBLEM SELECTOR PLAN 1
Presents with R flank pain radiating to the right side of the abdomen; UA + LE and +WBC  CTAP - 6 x 3 mm stone seen within the left distal ureter with L hydronephrosis; asymm urinary bladder thickening; L>R perinephric stranding;  - No intervention per urology  - medical management for stone  - MRI spine without evidence of OM or discitis.   - Pain management per Chronic pain service.

## 2023-06-16 NOTE — PROGRESS NOTE ADULT - SUBJECTIVE AND OBJECTIVE BOX
Follow Up:    Bacteriemia    Interval History:  Confused and agitated overnight  Patient appears disoriented, able to provide some information   Afebrile and no leukocytosis  MR foot aborted due to patient intolerance    REVIEW OF SYSTEMS  [  ] ROS unobtainable because:    [ x ] All other systems negative except as noted below    Constitutional:  [ ] fever [ ] chills  [ ] weight loss  [ ] weakness  Skin:  [ ] rash [ ] phlebitis	  Eyes: [ ] icterus [ ] pain  [ ] discharge	  ENMT: [ ] sore throat  [ ] thrush [ ] ulcers [ ] exudates  Respiratory: [ ] dyspnea [ ] hemoptysis [ ] cough [ ] sputum	  Cardiovascular:  [ ] chest pain [ ] palpitations [ ] edema	  Gastrointestinal:  [ ] nausea [ ] vomiting [ ] diarrhea [ ] constipation [ ] pain	  Genitourinary:  [ ] dysuria [ ] frequency [ ] hematuria [ ] discharge [ ] flank pain  [ ] incontinence  Musculoskeletal:  [ ] myalgias [ ] arthralgias [ ] arthritis  [ ] back pain  Neurological:  [ ] headache [ ] seizures  [x ] confusion/altered mental status    Allergies  codeine (Anaphylaxis)        ANTIMICROBIALS:  ertapenem  IVPB    ertapenem  IVPB 1000 every 24 hours  valGANciclovir 450 daily  vancomycin  IVPB 1500 every 24 hours      OTHER MEDS:  MEDICATIONS  (STANDING):  acetaminophen     Tablet .. 975 every 8 hours  acetaminophen   IVPB .. 1000 once  ALPRAZolam 0.5 once  dextrose 50% Injectable 25 once  dextrose 50% Injectable 12.5 once  dextrose 50% Injectable 25 once  dextrose Oral Gel 15 once  glucagon  Injectable 1 once  heparin   Injectable 5000 every 8 hours  HYDROmorphone   Solution 1 every 4 hours PRN  insulin glargine Injectable (LANTUS) 10 at bedtime  insulin lispro (ADMELOG) corrective regimen sliding scale  three times a day before meals  insulin lispro (ADMELOG) corrective regimen sliding scale  at bedtime  methocarbamol 750 every 6 hours  mycophenolate mofetil 500 two times a day  pantoprazole    Tablet 40 before breakfast  predniSONE   Tablet 10 daily  sertraline 100 daily  tamsulosin 0.4 at bedtime      Vital Signs Last 24 Hrs  T(C): 37.1 (2023 13:42), Max: 37.1 (2023 13:42)  T(F): 98.7 (2023 13:42), Max: 98.7 (2023 13:42)  HR: 78 (2023 13:42) (65 - 78)  BP: 119/57 (2023 13:42) (99/51 - 131/56)  BP(mean): --  RR: 18 (2023 13:42) (18 - 18)  SpO2: 96% (2023 13:42) (95% - 97%)    Parameters below as of 2023 13:42  Patient On (Oxygen Delivery Method): room air        PHYSICAL EXAMINATION:  General: Alert and Awake, NAD  HEENT: PERRL, EOMI  Neck: Supple  Cardiac: RRR, No M/R/G  Resp: CTAB, No Wh/Rh/Ra  Abdomen: NBS, NT/ND, No HSM, No rigidity or guarding  MSK: No LE edema. No Calf tenderness  : No bob  Skin: No rashes or lesions. Skin is warm and dry to the touch.   Neuro: Alert and Awake. CN 2-12 Grossly intact. Moves all four extremities spontaneously.  Psych: Calm, Pleasant, Cooperative                          9.8    7.69  )-----------( 259      ( 2023 06:35 )             31.2       06-16    134<L>  |  101  |  45<H>  ----------------------------<  137<H>  3.8   |  22  |  2.03<H>    Ca    9.2      2023 06:31  Phos  3.3     06  Mg     2.1         TPro  7.2  /  Alb  3.1<L>  /  TBili  0.2  /  DBili  x   /  AST  12  /  ALT  31  /  AlkPhos  164<H>            MICROBIOLOGY:  Vancomycin Level, Trough: 15.6 ug/mL (23 @ 00:51)  v  .Blood Blood-Peripheral  23   No growth to date.  --  --      .Blood Blood  23   No growth to date.  --  --      .Blood Blood  23   Growth in aerobic and anaerobic bottles: Corynebacterium striatum group  Sent out to reference laboratory for susceptibility testing.  See accession # [id number] for report.  --    Growth in aerobic bottle: Gram Positive Rods  Growth in anaerobic bottle: Gram Positive Rods      .Blood Blood  23   Growth in aerobic and anaerobic bottles: Corynebacterium striatum group  "Susceptibilities not performed"  ***Blood Panel PCR results on this specimen are available  approximately 3 hours after the Gram stain result.***  Gram stain, PCR, and/or culture results may not always  correspond due to difference in methodologies.  ************************************************************  This PCR assay was performed by multiplex PCR. This  Assay tests for 66 bacterial and resistance gene targets.  Please refer to the [facility] test directory  at [email or web address] for details.  --  Blood Culture PCR      Clean Catch Clean Catch (Midstream)  23   <10,000 CFU/mL Normal Urogenital Shantel  --  --          Rapid RVP Result: NotDetec ( @ 09:00)  CMVPCR Log: NotDetec Aio16EZ/mL ( @ 07:13)        RADIOLOGY:    <The imaging below has been reviewed and visualized by me independently. Findings as detailed in report below>    < from: MR Foot No Cont, Right (06.15.23 @ 22:55) >  IMPRESSION:  1.  Nondiagnostic exam. Few sequences were obtained prior to early    of disc due to patient discomfort. Extensive motion artifact is   also present.    2.  Limited findings do raise concern for osteomyelitis underlying a deep   ulcer, involving the fifth metatarsal stump, fourth metatarsal base, and   cuneiform lateral margin. Advise repeat MRI when the patient is able to   confirm or refute these findings.    --- End of Report ---    < end of copied text >

## 2023-06-16 NOTE — PROGRESS NOTE ADULT - ATTENDING COMMENTS
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   #Corynebacterium bacteremia  #s/p OLT  #CKD  #CMV  #Back Pain  -unclear source most likely previous TMA site, MRI poor but ?OM, pending repeat. On Vanc  -On Ertapenem for UTI  -Continue MMF + pred  -Transplant ID following  -MRI spine with cervical and lumbar DJD, attempting pain control, pain assisting, mild improvement.

## 2023-06-16 NOTE — PROGRESS NOTE ADULT - ASSESSMENT
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 Pt states this pain began ~2 days ago, Denies recent trauma, travel, infections, or sick contacts. He denies N/F/V/C, CP, SOB, diarrhea, constipation, melena/ hematochezia, dysuria, and hematuria. State the R back/ flank pain moves around to the right side of the abdomen. The hip pain does not move to groin or down the extremity. States both these pains worsen with movement. Pt also reports L neck pain moving to shoulder; states it has been difficult to rotate head. Reports fatigue few weeks prior to onset of symptoms. Due to pain, he has addison unable to get out of bed. States pain mildly improves with Tylenol. Pt has not had any recent medication changes.     In the ED- Tmax 100,2, SBP 130s, HR 70- 80  Labs- WBC 7.64, Hgb 11.2, Plt 254, BUN 44, Cr 1.82, VBG- 7.35/37/33/20 lactate 1.0, UA + LE +  [id number]  Imaging  -CTAP 6 x 3 mm stone seen within the left distal ureter with L hydronephrosis; asymm urinary bladder thickening; L>R perinephric stranding; possible distal esophagitis; trace pelvic fluid with right pelvic surgical clip  -ED POCUS: right hip effusion  Meds- CTX (11pm), Vanc (1am), Fentanly 25, NS bolus 250 (12 Jun 2023 05:38)      #hydronephrosis, back pain, Right hip effusion  --UA (6/11, 6/12) - Large leukocyte esterase, 279 WBC, no bacteria or nitrite   --Afebrile - curve temperatures   --No leukocytosis - trend CBC  --MRSA/MSSA Nares - negative  --CT A/P (6/11) - 6X3 mm stone within left ureter  with left hydronephrosis  --RVP negative  --Blood culture (6/12) - +GNR, +pcr corynebacterium species  --Urine Cx (6/11) - Normal urogenital ninfa  --MRI of Thoracic, cervical and lumbar spine:  No MR imaging findings strongly suspicious for septic discitis.    Recommendations:  --Continue Vancomycin 1550 mg Q24 hr, check trough pre 3rd, and Ertapenem 1 gm Q24 hr. Considering foot abscess from 01/22/23 was positive for Klebsiella PNA, bacteroides thetaiotamicron actinomyces odontolyticus strep mitis/oralis and strep agalactiae.   --Follow blood culture  --MR foot- Limited findings do raise concern for osteomyelitis underlying a deep   ulcer, involving the fifth metatarsal stump, fourth metatarsal base, and   cuneiform lateral margin. Advise repeat MRI when the patient is able to   confirm or refute these findings.

## 2023-06-16 NOTE — PROGRESS NOTE ADULT - SUBJECTIVE AND OBJECTIVE BOX
PROGRESS NOTE:   Authored by Morgan Fairchild MD   Patient is a 67y old  Male who presents with a chief complaint of back pain (15 Rachid 2023 14:46)      SUBJECTIVE / OVERNIGHT EVENTS:  No acute events overnight.     ADDITIONAL REVIEW OF SYSTEMS:    MEDICATIONS  (STANDING):  acetaminophen     Tablet .. 975 milliGRAM(s) Oral every 8 hours  acetaminophen   IVPB .. 1000 milliGRAM(s) IV Intermittent once  ALPRAZolam 0.5 milliGRAM(s) Oral once  chlorhexidine 4% Liquid 1 Application(s) Topical daily  dextrose 5%. 1000 milliLiter(s) (50 mL/Hr) IV Continuous <Continuous>  dextrose 5%. 1000 milliLiter(s) (100 mL/Hr) IV Continuous <Continuous>  dextrose 50% Injectable 25 Gram(s) IV Push once  dextrose 50% Injectable 12.5 Gram(s) IV Push once  dextrose 50% Injectable 25 Gram(s) IV Push once  dextrose Oral Gel 15 Gram(s) Oral once  ertapenem  IVPB      ertapenem  IVPB 1000 milliGRAM(s) IV Intermittent every 24 hours  folic acid 1 milliGRAM(s) Oral daily  glucagon  Injectable 1 milliGRAM(s) IntraMuscular once  heparin   Injectable 5000 Unit(s) SubCutaneous every 8 hours  insulin glargine Injectable (LANTUS) 10 Unit(s) SubCutaneous at bedtime  insulin lispro (ADMELOG) corrective regimen sliding scale   SubCutaneous three times a day before meals  insulin lispro (ADMELOG) corrective regimen sliding scale   SubCutaneous at bedtime  lidocaine   4% Patch 3 Patch Transdermal every 24 hours  methocarbamol 750 milliGRAM(s) Oral every 6 hours  mycophenolate mofetil 500 milliGRAM(s) Oral two times a day  pantoprazole    Tablet 40 milliGRAM(s) Oral before breakfast  predniSONE   Tablet 10 milliGRAM(s) Oral daily  sertraline 100 milliGRAM(s) Oral daily  tamsulosin 0.4 milliGRAM(s) Oral at bedtime  valGANciclovir 450 milliGRAM(s) Oral daily  vancomycin  IVPB 1500 milliGRAM(s) IV Intermittent every 24 hours    MEDICATIONS  (PRN):  HYDROmorphone   Solution 1 milliGRAM(s) Oral every 4 hours PRN Severe Pain (7 - 10)      CAPILLARY BLOOD GLUCOSE      POCT Blood Glucose.: 146 mg/dL (15 Rachid 2023 22:12)  POCT Blood Glucose.: 181 mg/dL (15 Rachid 2023 17:30)  POCT Blood Glucose.: 248 mg/dL (15 Rachid 2023 13:15)  POCT Blood Glucose.: 157 mg/dL (15 Rachid 2023 09:31)    I&O's Summary    15 Rachid 2023 07:01  -  16 Jun 2023 07:00  --------------------------------------------------------  IN: 240 mL / OUT: 1150 mL / NET: -910 mL        PHYSICAL EXAM:  Vital Signs Last 24 Hrs  T(C): 36.8 (16 Jun 2023 04:54), Max: 37.1 (15 Rachid 2023 12:53)  T(F): 98.2 (16 Jun 2023 04:54), Max: 98.7 (15 Rachid 2023 12:53)  HR: 69 (16 Jun 2023 04:54) (65 - 69)  BP: 128/51 (16 Jun 2023 04:54) (99/51 - 131/56)  BP(mean): --  RR: 18 (16 Jun 2023 04:54) (18 - 18)  SpO2: 97% (16 Jun 2023 04:54) (95% - 97%)    Parameters below as of 16 Jun 2023 04:54  Patient On (Oxygen Delivery Method): room air        GENERAL: No apparent distress.   HEAD:  Atraumatic, Normocephalic  EYES: EOMI, PERRLA, conjunctiva and sclera clear  NECK: Supple, no lymphadenopathy, no elevated JVP  CHEST/LUNG: Clear to auscultation bilateral and symmetric; No wheezes, rales, or rhonchi  HEART: S1 and S2 normal. Regular rate and rhythm; No murmurs, rubs, or gallops  ABDOMEN: Soft, non-tender, non-distended; normal bowel sounds  EXTREMITIES:  2+ peripheral pulses b/l, No clubbing, cyanosis, or edema  NEUROLOGY: A&O x 3, RLE 3/5, LLE 4/5, CN intact.   SKIN: No rashes or lesions      LABS:                        9.8    7.69  )-----------( 259      ( 16 Jun 2023 06:35 )             31.2     06-16    134<L>  |  101  |  45<H>  ----------------------------<  137<H>  3.8   |  22  |  2.03<H>    Ca    9.2      16 Jun 2023 06:31  Phos  3.3     06-16  Mg     2.1     06-16    TPro  7.2  /  Alb  3.1<L>  /  TBili  0.2  /  DBili  x   /  AST  12  /  ALT  31  /  AlkPhos  164<H>  06-16              Culture - Blood (collected 14 Jun 2023 07:06)  Source: .Blood Blood-Peripheral  Preliminary Report (15 Rachid 2023 11:01):    No growth to date.    Culture - Blood (collected 14 Jun 2023 07:06)  Source: .Blood Blood-Peripheral  Preliminary Report (15 Rachid 2023 11:01):    No growth to date.    Culture - Blood (collected 13 Jun 2023 18:00)  Source: .Blood Blood  Preliminary Report (14 Jun 2023 22:01):    No growth to date.        RADIOLOGY & ADDITIONAL TESTS:  Lab Results Reviewed   Imaging Reviewed  Electrocardiogram Reviewed

## 2023-06-16 NOTE — PROGRESS NOTE ADULT - ASSESSMENT
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 Found to have + corynebacterium in 2/4 cultures. MRI showing - no evidence of OM/discitis, degenerative changes present. Pending MRI Foot. BCx 6/13 NGTD. TTE without evidence of endocarditis/moderate AR/mildAS, normal LVEF.

## 2023-06-17 NOTE — PROGRESS NOTE ADULT - PROBLEM SELECTOR PLAN 7
f/u outpatient with cardiology Jessica Mike  (RN)  2018 17:15:07 Home medication: Ezetimibe 10mg, Lovaza 1g, Prednisone 10mg daily, Cellcept 500 BID, Valcyte 450 QD, Protonix 40  - everolimus on hold for skin graft, will restart per transplant hepatology. Ev Whitt)  2018 12:17:22

## 2023-06-17 NOTE — PROGRESS NOTE ADULT - ASSESSMENT
67 year old male with history of HTN, HLD, obesity, T2DM, JEAN cirrhosis s/p OLT 7/2/2020 [currently on  BID and prednisone 10mg daily; intolerant of CNIs due to neurotoxicity; intolerant of everolimus due to prior workup of skin grafts and osteomyelitis], still on Valcyte due to CMV PCR detected 1/18/23, prior VRE bacteremia, s/p multiple toe amputations 9/2022 2/2 infections, osteomyelitis s/p right TMA 1/25/2023, HFpEF, mild LV dysfunction, MGUS, chronic anemia, SHENG, CKD, h/o ureteral stone who now  presents to the ED with back pain and right flank pain.  Patient presents with back pain radiating to his right flank, which has been progressively worsening over the past several days.  Upon admission, he states he is "unaware of his medications," stating his son puts them in a pill box and then "he takes them."  Most recent hepatology clinic note 4/20/2023 describes current regimen of MMF 500mg BID and prednisone 10mg daily.    # Fever  # Altered mental status: more compatible with delirium than HE  # Ureteral stone with associated hydronephrosis  # Mildly elevated liver chemistries: most likely related to acute infection  # JEAN cirrhosis s/p LT 7/2/2023 [currently on  BID and prednisone 10mg daily; intolerant of CNIs due to neurotoxicity; intolerant of everolimus due to prior workup of skin grafts and osteomyelitis]  # Recent CMV viremia    Recommendations:  -trend clinical symptoms, exam findings, vital signs, CBC, CMP, INR  -repeat panculture and complete infectious workup  -avoid psychotropic medications  -check CT head  -psych consult  -Transplant ID  -follow-up Urology recommendations  -continue home immunosuppression regimen MMF 500mg BID and prednisone 10mg daily  -if skin grafts completed and without foreseeable issues with wound healing, will consider re-initiation of everolimus in the future after acute illness resolves    Note incomplete until finalized by attending signature/attestation.    Gallo Rodriguez  GI/Hepatology Fellow    MONDAY-FRIDAY 8AM-5PM:  Pager# [phone] ([facility]) or [phone] ([facility])    NON-URGENT CONSULTS:  Please email [email or web address]  AT NIGHT AND ON WEEKENDS:  Contact on-call GI fellow via answering service ([phone]) from 5pm-8am and on weekends/holidays

## 2023-06-17 NOTE — PROGRESS NOTE ADULT - ATTENDING COMMENTS
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   #Corynebacterium bacteremia  #s/p OLT  #CKD  #CMV  #Back Pain  - unclear source most likely previous TMA site, MRI poor but ?OM, pending repeat. On Vanc  - blood cultures positive for corynebacterium, repeat BCx pending, TTE no infective endocarditis   - urine cultures growing GNR, will c/w ertapenem for now, f/u transplant ID  - mental status AAOx3, but confused, redirectable, non focal neuro exam, possibly related to UTI?, obtain ammonia level  - Continue MMF + pred  - MRI spine with cervical and lumbar DJD, c/w pain control  - rest of plan as above

## 2023-06-17 NOTE — PROGRESS NOTE ADULT - PROBLEM SELECTOR PLAN 1
Presents with R flank pain radiating to the right side of the abdomen; UA + LE and +WBC  CTAP - 6 x 3 mm stone seen within the left distal ureter with L hydronephrosis; asymm urinary bladder thickening; L>R perinephric stranding;  - No intervention per urology  - medical management for stone  - MRI spine without evidence of OM or discitis.   - Pain management per Chronic pain service. Presents with R flank pain radiating to the right side of the abdomen; UA + LE and +WBC  CTAP - 6 x 3 mm stone seen within the left distal ureter with L hydronephrosis; asymm urinary bladder thickening; L>R perinephric stranding;  - No intervention per urology  - medical management for stone  - MRI spine without evidence of OM or discitis.   - Pain management per Chronic pain service.  - MR foot repeat pending, relatively incomplete showing possible OM of 4th and 5th metatarsal/cuneiform.

## 2023-06-17 NOTE — PROGRESS NOTE ADULT - PROBLEM SELECTOR PLAN 5
Home medications: Lantus 14 qhs and Admelog 8u TID    - c/w ISS and Lantus 10 qhs - FeNa 0.9%, s/p IV contrast, however baseline around 1.8-2. Likely at baseline.   Cr 1.82  likely prerenal/ poor PO intake. S/p 250 bolus in the ED

## 2023-06-17 NOTE — PROGRESS NOTE ADULT - SUBJECTIVE AND OBJECTIVE BOX
PROGRESS NOTE:   Authored by Morgan Fairchild MD   Patient is a 67y old  Male who presents with a chief complaint of back pain (16 Jun 2023 15:58)      SUBJECTIVE / OVERNIGHT EVENTS:  No acute events overnight.     ADDITIONAL REVIEW OF SYSTEMS:    MEDICATIONS  (STANDING):  acetaminophen     Tablet .. 975 milliGRAM(s) Oral every 8 hours  acetaminophen   IVPB .. 1000 milliGRAM(s) IV Intermittent once  ALPRAZolam 0.5 milliGRAM(s) Oral once  chlorhexidine 4% Liquid 1 Application(s) Topical daily  dextrose 5%. 1000 milliLiter(s) (50 mL/Hr) IV Continuous <Continuous>  dextrose 5%. 1000 milliLiter(s) (100 mL/Hr) IV Continuous <Continuous>  dextrose 50% Injectable 25 Gram(s) IV Push once  dextrose 50% Injectable 12.5 Gram(s) IV Push once  dextrose 50% Injectable 25 Gram(s) IV Push once  dextrose Oral Gel 15 Gram(s) Oral once  ertapenem  IVPB 1000 milliGRAM(s) IV Intermittent every 24 hours  ertapenem  IVPB      folic acid 1 milliGRAM(s) Oral daily  glucagon  Injectable 1 milliGRAM(s) IntraMuscular once  heparin   Injectable 5000 Unit(s) SubCutaneous every 8 hours  insulin glargine Injectable (LANTUS) 10 Unit(s) SubCutaneous at bedtime  insulin lispro (ADMELOG) corrective regimen sliding scale   SubCutaneous three times a day before meals  insulin lispro (ADMELOG) corrective regimen sliding scale   SubCutaneous at bedtime  lidocaine   4% Patch 3 Patch Transdermal every 24 hours  methocarbamol 750 milliGRAM(s) Oral every 6 hours  mycophenolate mofetil 500 milliGRAM(s) Oral two times a day  pantoprazole    Tablet 40 milliGRAM(s) Oral before breakfast  predniSONE   Tablet 10 milliGRAM(s) Oral daily  sertraline 100 milliGRAM(s) Oral daily  tamsulosin 0.4 milliGRAM(s) Oral at bedtime  valGANciclovir 450 milliGRAM(s) Oral daily  vancomycin  IVPB 1500 milliGRAM(s) IV Intermittent every 24 hours    MEDICATIONS  (PRN):  HYDROmorphone   Solution 1 milliGRAM(s) Oral every 4 hours PRN Severe Pain (7 - 10)      CAPILLARY BLOOD GLUCOSE      POCT Blood Glucose.: 93 mg/dL (16 Jun 2023 21:29)  POCT Blood Glucose.: 136 mg/dL (16 Jun 2023 17:30)  POCT Blood Glucose.: 168 mg/dL (16 Jun 2023 13:50)  POCT Blood Glucose.: 138 mg/dL (16 Jun 2023 08:45)    I&O's Summary      PHYSICAL EXAM:  Vital Signs Last 24 Hrs  T(C): 36.8 (17 Jun 2023 04:19), Max: 37.1 (16 Jun 2023 13:42)  T(F): 98.3 (17 Jun 2023 04:19), Max: 98.7 (16 Jun 2023 13:42)  HR: 75 (17 Jun 2023 04:19) (75 - 78)  BP: 104/49 (17 Jun 2023 04:19) (104/49 - 131/53)  BP(mean): --  RR: 18 (17 Jun 2023 04:19) (18 - 18)  SpO2: 94% (17 Jun 2023 04:19) (94% - 96%)    Parameters below as of 17 Jun 2023 04:19  Patient On (Oxygen Delivery Method): room air        GENERAL: No apparent distress.   HEAD:  Atraumatic, Normocephalic  EYES: EOMI, PERRLA, conjunctiva and sclera clear  NECK: Supple, no lymphadenopathy, no elevated JVP  CHEST/LUNG: Clear to auscultation bilateral and symmetric; No wheezes, rales, or rhonchi  HEART: S1 and S2 normal. Regular rate and rhythm; No murmurs, rubs, or gallops  ABDOMEN: Soft, non-tender, non-distended; normal bowel sounds  EXTREMITIES:  2+ peripheral pulses b/l, No clubbing, cyanosis, or edema  NEUROLOGY: A&O x 3, RLE 4/5, LLE 4/5, CN intact.   SKIN: No rashes or lesions    LABS:                        9.8    7.69  )-----------( 259      ( 16 Jun 2023 06:35 )             31.2     06-16    134<L>  |  101  |  45<H>  ----------------------------<  137<H>  3.8   |  22  |  2.03<H>    Ca    9.2      16 Jun 2023 06:31  Phos  3.3     06-16  Mg     2.1     06-16    TPro  7.2  /  Alb  3.1<L>  /  TBili  0.2  /  DBili  x   /  AST  12  /  ALT  31  /  AlkPhos  164<H>  06-16                RADIOLOGY & ADDITIONAL TESTS:  Lab Results Reviewed   Imaging Reviewed  Electrocardiogram Reviewed
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
## 2023-06-17 NOTE — PROGRESS NOTE ADULT - PROBLEM SELECTOR PLAN 2
BCx positive for corynebacterium  - c/w vancomycin  - c/w ertapenem  - repeat BCx  - TTE - no evidence of endocarditis, aortic valve anatomy not evaluated. Pt with increasing confusion, non-focal exam.  Likely due to lyrica iso CKD vs robaxin vs hospital-acquired, infection - bcx cleared

## 2023-06-17 NOTE — PROGRESS NOTE ADULT - ATTENDING COMMENTS
68yo male with history of HTN, HLD, obesity, T2DM, JEAN cirrhosis 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. Currently on MMF 500mg BID and prednisone 10mg daily. Other comorbidities include HFpEF, mild LV dysfunction, MGUS, chronic anemia, SHENG, CKD, and h/o ureteral stone. Presents to the ED with back pain and right flank pain.     Findings include:    -Fever  -Altered mental status (compatible with delirium)  -Ureteral stone with associated hydronephrosis  -Mildly elevated liver chemistries (likely related to acute infection)  -JEAN cirrhosis s/p LT on 7/2/2023  -Recent CMV viremia    Recommendations:    -Monitor clinical symptoms, vital signs, CBC, CMP, INR  -Repeat panculture and complete infectious workup  -Avoid psychotropic medications  -Check CT head  -Consult psychiatrist  -Involve Transplant ID  -Follow up with Urology for stone management  -Continue home immunosuppression regimen (MMF 500mg BID and prednisone 10mg daily)  -Consider re-initiation of everolimus after acute illness resolves (likely as outpatient), if skin grafts are completed without foreseeable wound healing issues.

## 2023-06-17 NOTE — PROGRESS NOTE ADULT - PROBLEM SELECTOR PLAN 3
POCUS with R hip effusion but low concern of sepsis  - CT A&P with unremarkable effusion BCx positive for corynebacterium  - c/w vancomycin  - c/w ertapenem  - repeat BCx  - TTE - no evidence of endocarditis, aortic valve anatomy not evaluated.

## 2023-06-17 NOTE — PROGRESS NOTE ADULT - PROBLEM SELECTOR PLAN 4
- FeNa 0.9%, s/p IV contrast, however baseline around 1.8-2. Likely at baseline.   Cr 1.82  likely prerenal/ poor PO intake. S/p 250 bolus in the ED POCUS with R hip effusion but low concern of sepsis  - CT A&P with unremarkable effusion

## 2023-06-17 NOTE — PROGRESS NOTE ADULT - PROBLEM SELECTOR PLAN 6
Home medication: Ezetimibe 10mg, Lovaza 1g, Prednisone 10mg daily, Cellcept 500 BID, Valcyte 450 QD, Protonix 40  - everolimus on hold for skin graft, will restart per transplant hepatology. Home medications: Lantus 14 qhs and Admelog 8u TID    - c/w ISS and Lantus 10 qhs

## 2023-06-18 NOTE — PROGRESS NOTE ADULT - ASSESSMENT
67 year old male with history of HTN, HLD, obesity, T2DM, JEAN cirrhosis s/p OLT 7/2/2020 [currently on  BID and prednisone 10mg daily; intolerant of CNIs due to neurotoxicity; intolerant of everolimus due to prior workup of skin grafts and osteomyelitis], still on Valcyte due to CMV PCR detected 1/18/23, prior VRE bacteremia, s/p multiple toe amputations 9/2022 2/2 infections, osteomyelitis s/p right TMA 1/25/2023, HFpEF, mild LV dysfunction, MGUS, chronic anemia, SHENG, CKD, h/o ureteral stone who now  presents to the ED with back pain and right flank pain.  Patient presents with back pain radiating to his right flank, which has been progressively worsening over the past several days.  Upon admission, he states he is "unaware of his medications," stating his son puts them in a pill box and then "he takes them."  Most recent hepatology clinic note 4/20/2023 describes current regimen of MMF 500mg BID and prednisone 10mg daily.    # Fever  # Altered mental status: more compatible with delirium than HE  # MISA on CKD  # Ureteral stone with associated hydronephrosis  # Mildly elevated liver chemistries: most likely related to acute infection  # JEAN cirrhosis s/p LT 7/2/2023 [currently on  BID and prednisone 10mg daily; intolerant of CNIs due to neurotoxicity; intolerant of everolimus due to prior workup of skin grafts and osteomyelitis]  # Recent CMV viremia    Recommendations:  -trend clinical symptoms, exam findings, vital signs, CBC, CMP, INR  -repeat panculture and complete infectious workup  -avoid psychotropic medications  -check CT head  -appreciate recommendations from psych  -Transplant ID  -follow-up Urology recommendations  -recommend albumin 25% 100cc q8h x3 doses  -continue home immunosuppression regimen MMF 500mg BID and prednisone 10mg daily  -if skin grafts completed and without foreseeable issues with wound healing, will consider re-initiation of everolimus in the future after acute illness resolves    Note incomplete until finalized by attending signature/attestation.    Gallo Rodriguez  GI/Hepatology Fellow    MONDAY-FRIDAY 8AM-5PM:  Pager# [phone] ([facility]) or [phone] ([facility])    NON-URGENT CONSULTS:  Please email [email or web address]  AT NIGHT AND ON WEEKENDS:  Contact on-call GI fellow via answering service ([phone]) from 5pm-8am and on weekends/holidays

## 2023-06-18 NOTE — PROVIDER CONTACT NOTE (OTHER) - ASSESSMENT
pt is confused and upset, he is yelling
Patient combative, cursing at staff, confused aggressive attempting to get out of bed.

## 2023-06-18 NOTE — PROVIDER CONTACT NOTE (OTHER) - BACKGROUND
Admitting dx: UTI, PMH: DM2, HTN, MISA, OLT in 2020
Pt admitted for UTI
UTI, renal stones, GERD, hepatic encephalopathy

## 2023-06-18 NOTE — PROVIDER CONTACT NOTE (OTHER) - REASON
unable to tolerate CT scan
Patient combative
Pt states he has a health care proxy
Pt is confused and agitated

## 2023-06-18 NOTE — PROVIDER CONTACT NOTE (OTHER) - SITUATION
Pt states he has a health care proxy
PT is confused and agitated, he thinks he is home and is very upset
Pt sent back from CT scan with c/o severe claustrophobia
Patient combative

## 2023-06-18 NOTE — BH CONSULTATION LIAISON ASSESSMENT NOTE - OTHER PAST PSYCHIATRIC HISTORY (INCLUDE DETAILS REGARDING ONSET, COURSE OF ILLNESS, INPATIENT/OUTPATIENT TREATMENT)
In 7/2020, patient also presented with confusion and patient was started on seroquel 25mg qhs and melatonin 3mg qhs and switched tacrolimus to everolimus with improvement in symptoms.

## 2023-06-18 NOTE — BH CONSULTATION LIAISON ASSESSMENT NOTE - DESCRIPTION
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

## 2023-06-18 NOTE — BH CONSULTATION LIAISON ASSESSMENT NOTE - NSBHCHARTREVIEWVS_PSY_A_CORE FT
Vital Signs Last 24 Hrs  T(C): 37.4 (17 Jun 2023 21:40), Max: 37.4 (17 Jun 2023 21:40)  T(F): 99.4 (17 Jun 2023 21:40), Max: 99.4 (17 Jun 2023 21:40)  HR: 71 (18 Jun 2023 04:39) (71 - 83)  BP: 128/68 (18 Jun 2023 04:39) (128/68 - 134/65)  BP(mean): --  RR: 18 (18 Jun 2023 04:39) (18 - 18)  SpO2: 94% (18 Jun 2023 04:39) (94% - 100%)    Parameters below as of 18 Jun 2023 04:39  Patient On (Oxygen Delivery Method): room air

## 2023-06-18 NOTE — BH CONSULTATION LIAISON ASSESSMENT NOTE - NSBHATTESTCOMMENTATTENDFT_PSY_A_CORE
67M , living with spouse, has adult children, retired, with PPHx depression, no prior SA or psychiatric admissions, no active substance abuse, with PMH significant for obesity, DM2, HFpEF, JEAN cirrhosis s/p liver transplant 2020 presenting with RUQ, R mid abdominal/flank pain, R hip pain a/w fever, psychiatry consulted for AMS.  Pt on interview oriented to self, off on date, aware he is in a hospital, states he feels "fine," denies SI/HI, AVH, or paranoia.  Limited historian, at times contradicting himself.  Attention impaired.  Per primary team, pt previously with delirium r/t transplant meds, resolved with discontinuation.  Previous C-L notes show pt previously tx'd with Seroquel.  Dx: Delirium 2/2 GMC.  Recs: Start Seroquel as above with Haldol PRN, use lowest effective steroid dose, check TSH, B12, Folate.  Consider MRI/EEG/neuro eval if MS not improving.  C-L psychiatry will continue to follow.  Agree with resident's assessment and plan as above.

## 2023-06-18 NOTE — BH CONSULTATION LIAISON ASSESSMENT NOTE - PATIENT'S CHIEF COMPLAINT
"I was the first surviving male liver transplant at [facility]" "I was the first surviving male liver transplant at [facility]"

## 2023-06-18 NOTE — PROGRESS NOTE ADULT - SUBJECTIVE AND OBJECTIVE BOX
Interval Events:   No acute events noted, unable to obtain full HPI due to underlying mental status, however appears more calm than yesterday.    ROS:   Unable to fully obtain ROS.    Hospital Medications:  acetaminophen     Tablet .. 975 milliGRAM(s) Oral every 8 hours  acetaminophen   IVPB .. 1000 milliGRAM(s) IV Intermittent once  chlorhexidine 4% Liquid 1 Application(s) Topical daily  dextrose 5%. 1000 milliLiter(s) IV Continuous <Continuous>  dextrose 5%. 1000 milliLiter(s) IV Continuous <Continuous>  dextrose 50% Injectable 25 Gram(s) IV Push once  dextrose 50% Injectable 12.5 Gram(s) IV Push once  dextrose 50% Injectable 25 Gram(s) IV Push once  dextrose Oral Gel 15 Gram(s) Oral once  ertapenem  IVPB      ertapenem  IVPB 1000 milliGRAM(s) IV Intermittent every 24 hours  folic acid 1 milliGRAM(s) Oral daily  glucagon  Injectable 1 milliGRAM(s) IntraMuscular once  heparin   Injectable 5000 Unit(s) SubCutaneous every 8 hours  HYDROmorphone   Solution 1 milliGRAM(s) Oral every 4 hours PRN  insulin glargine Injectable (LANTUS) 10 Unit(s) SubCutaneous at bedtime  insulin lispro (ADMELOG) corrective regimen sliding scale   SubCutaneous three times a day before meals  insulin lispro (ADMELOG) corrective regimen sliding scale   SubCutaneous at bedtime  lactulose Syrup 10 Gram(s) Oral three times a day  lidocaine   4% Patch 3 Patch Transdermal every 24 hours  LORazepam   Injectable 1 milliGRAM(s) IV Push once PRN  melatonin 3 milliGRAM(s) Oral at bedtime  mycophenolate mofetil 500 milliGRAM(s) Oral two times a day  pantoprazole    Tablet 40 milliGRAM(s) Oral before breakfast  predniSONE   Tablet 10 milliGRAM(s) Oral daily  QUEtiapine 25 milliGRAM(s) Oral at bedtime  rifAXIMin 550 milliGRAM(s) Oral two times a day  tamsulosin 0.4 milliGRAM(s) Oral at bedtime  thiamine IVPB 500 milliGRAM(s) IV Intermittent every 8 hours  valGANciclovir 450 milliGRAM(s) Oral daily      PHYSICAL EXAM:   Vital Signs:  Vital Signs Last 24 Hrs  T(C): 37.3 (18 Jun 2023 12:49), Max: 37.4 (17 Jun 2023 21:40)  T(F): 99.2 (18 Jun 2023 12:49), Max: 99.4 (17 Jun 2023 21:40)  HR: 74 (18 Jun 2023 12:49) (71 - 74)  BP: 129/52 (18 Jun 2023 12:49) (128/68 - 131/57)  BP(mean): --  RR: 20 (18 Jun 2023 12:49) (18 - 20)  SpO2: 94% (18 Jun 2023 12:49) (94% - 100%)    Parameters below as of 18 Jun 2023 12:49  Patient On (Oxygen Delivery Method): room air      Daily     Daily     GENERAL: no acute distress  NEURO: not fully alert/oriented  HEENT: NCAT, no conjunctival pallor appreciated  CHEST: no respiratory distress, no accessory muscle use  CARDIAC: regular rate, +S1/S2  ABDOMEN: soft, nontender, no rebound or guarding  EXTREMITIES: warm, well perfused  SKIN: no lesions noted    LABS: reviewed                        10.4   7.56  )-----------( 285      ( 18 Jun 2023 11:24 )             32.0     06-18    134<L>  |  101  |  44<H>  ----------------------------<  131<H>  4.3   |  19<L>  |  2.11<H>    Ca    9.2      18 Jun 2023 11:24  Phos  3.6     06-18  Mg     2.2     06-18    TPro  7.3  /  Alb  3.4  /  TBili  0.3  /  DBili  x   /  AST  13  /  ALT  22  /  AlkPhos  151<H>  06-18    LIVER FUNCTIONS - ( 18 Jun 2023 11:24 )  Alb: 3.4 g/dL / Pro: 7.3 g/dL / ALK PHOS: 151 U/L / ALT: 22 U/L / AST: 13 U/L / GGT: x             Interval Diagnostic Studies: see sunrise for full report

## 2023-06-18 NOTE — BH CONSULTATION LIAISON ASSESSMENT NOTE - HPI (INCLUDE ILLNESS QUALITY, SEVERITY, DURATION, TIMING, CONTEXT, MODIFYING FACTORS, ASSOCIATED SIGNS AND SYMPTOMS)
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 Psychiatry consulted for delirium.     Today, patient was found lying in bed talking to himself. Reports "I'm Prashant". AOx2 (was oriented self and date but not to location). Reports he came to the hospital because 'my leg gave out'. Reports 6/10 pain. Reports normal BM. reports sleeping and eating well. Reports mood 'very good'. Denies feeling confused or agitated. Reports a few years ago one of his liver medications made him confused but he no longer takes it anymore. Couldn't remember the name of the medication. Reports he remembers being on seroquel. Doesn't remember any side effects. Denies SI/ HI/AVH. Denies delusions, paranoia. Denies substance use. Denies depression, hopelessness. Later, patient reports feeling 'unhappy because I want to go home'. Reports being in pain because he's lying on the floor. Reports "I was the first surviving male liver transplant at [facility]". Patient suddenly appeared distracted, saying "what's going on? What's going on?" and tried to sit up. Patient was able to be calmed down. Patient muttered "I have 14 people in a car outside, half of them are infants".     Of note, patient's nurse reports patient sleeps all day and suddenly starts screaming at out at random bursts but then goes back to sleep. In 7/2020, patient also presented with confusion and patient was started on seroquel 25mg qhs and melatonin 3mg qhs and switched tacrolimus to everolimus with improvement in symptoms.

## 2023-06-18 NOTE — BH CONSULTATION LIAISON ASSESSMENT NOTE - NSBHCHARTREVIEWINVESTIGATE_PSY_A_CORE FT
< from: 12 Lead ECG (01.22.23 @ 17:18) >    entricular Rate 101 BPM    Atrial Rate 101 BPM    P-R Interval 188 ms    QRS Duration 104 ms    Q-T Interval 350 ms    QTC Calculation(Bazett) 453 ms    P Axis 29 degrees    R Axis 50 degrees    T Axis 43 degrees    Diagnosis Line SINUS TACHYCARDIA  INFERIOR INFARCT (CITED ON OR BEFORE 03-NOV-2022)  CANNOT RULE OUT ANTERIOR INFARCT , AGE UNDETERMINED  ABNORMAL ECG  WHEN COMPARED WITH ECG OF 26-DEC-2022 20:09,  SIGNIFICANT CHANGES HAVE OCCURRED  faster HR  Confirmed by DEBBIE CAMACHO MD ([id number]) on 1/23/2023 11:42:34 AM    < end of copied text >

## 2023-06-18 NOTE — BH CONSULTATION LIAISON ASSESSMENT NOTE - NSBHCHARTREVIEWLAB_PSY_A_CORE FT
CAPILLARY BLOOD GLUCOSE      POCT Blood Glucose.: 127 mg/dL (18 Jun 2023 08:27)  POCT Blood Glucose.: 116 mg/dL (17 Jun 2023 21:44)  POCT Blood Glucose.: 176 mg/dL (17 Jun 2023 17:28)  POCT Blood Glucose.: 163 mg/dL (17 Jun 2023 12:49)                          10.1   7.40  )-----------( 267      ( 17 Jun 2023 07:52 )             31.7     06-17    136  |  101  |  44<H>  ----------------------------<  109<H>  3.8   |  21<L>  |  2.01<H>    Ca    9.2      17 Jun 2023 07:52  Phos  3.3     06-17  Mg     2.1     06-17    TPro  7.2  /  Alb  3.2<L>  /  TBili  0.3  /  DBili  x   /  AST  11  /  ALT  25  /  AlkPhos  155<H>  06-17

## 2023-06-18 NOTE — PROGRESS NOTE ADULT - ATTENDING COMMENTS
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   #Corynebacterium bacteremia  #s/p OLT  #CKD  #CMV  #Back Pain  - unclear source most likely previous TMA site, MRI poor but ?OM, pending repeat. On Vanc, levels supratherapeutic, decreased to 1250mg   - blood cultures positive for corynebacterium, repeat BCx pending, TTE no infective endocarditis   - urine cultures growing GNR, will c/w ertapenem for now, f/u transplant ID  - mental status AAOx3, but confused, redirectable, non focal neuro exam, possibly related to UTI vs delirium, ammonia level wnl, CTH, psych consult, and ID follow up today  - Continue MMF + pred, holding everolimus, restart as per hepatology  - MRI spine with cervical and lumbar DJD, c/w pain control  - rest of plan as above .

## 2023-06-18 NOTE — PROGRESS NOTE ADULT - SUBJECTIVE AND OBJECTIVE BOX
PROGRESS NOTE:   Authored by Dr. Rocky Diaz / Internal Medicine Resident    Patient is a 67y old  Male who presents with a chief complaint of back pain (17 Jun 2023 15:48)      SUBJECTIVE / OVERNIGHT EVENTS:    ADDITIONAL REVIEW OF SYSTEMS:    MEDICATIONS  (STANDING):  acetaminophen     Tablet .. 975 milliGRAM(s) Oral every 8 hours  acetaminophen   IVPB .. 1000 milliGRAM(s) IV Intermittent once  chlorhexidine 4% Liquid 1 Application(s) Topical daily  dextrose 5%. 1000 milliLiter(s) (50 mL/Hr) IV Continuous <Continuous>  dextrose 5%. 1000 milliLiter(s) (100 mL/Hr) IV Continuous <Continuous>  dextrose 50% Injectable 25 Gram(s) IV Push once  dextrose 50% Injectable 12.5 Gram(s) IV Push once  dextrose 50% Injectable 25 Gram(s) IV Push once  dextrose Oral Gel 15 Gram(s) Oral once  ertapenem  IVPB      ertapenem  IVPB 1000 milliGRAM(s) IV Intermittent every 24 hours  folic acid 1 milliGRAM(s) Oral daily  glucagon  Injectable 1 milliGRAM(s) IntraMuscular once  heparin   Injectable 5000 Unit(s) SubCutaneous every 8 hours  insulin glargine Injectable (LANTUS) 10 Unit(s) SubCutaneous at bedtime  insulin lispro (ADMELOG) corrective regimen sliding scale   SubCutaneous three times a day before meals  insulin lispro (ADMELOG) corrective regimen sliding scale   SubCutaneous at bedtime  lactulose Syrup 10 Gram(s) Oral three times a day  lidocaine   4% Patch 3 Patch Transdermal every 24 hours  mycophenolate mofetil 500 milliGRAM(s) Oral two times a day  pantoprazole    Tablet 40 milliGRAM(s) Oral before breakfast  predniSONE   Tablet 10 milliGRAM(s) Oral daily  rifAXIMin 550 milliGRAM(s) Oral two times a day  tamsulosin 0.4 milliGRAM(s) Oral at bedtime  thiamine IVPB 500 milliGRAM(s) IV Intermittent every 8 hours  valGANciclovir 450 milliGRAM(s) Oral daily  vancomycin  IVPB 1500 milliGRAM(s) IV Intermittent every 24 hours    MEDICATIONS  (PRN):  HYDROmorphone   Solution 1 milliGRAM(s) Oral every 4 hours PRN Severe Pain (7 - 10)      CAPILLARY BLOOD GLUCOSE      POCT Blood Glucose.: 116 mg/dL (17 Jun 2023 21:44)  POCT Blood Glucose.: 176 mg/dL (17 Jun 2023 17:28)  POCT Blood Glucose.: 163 mg/dL (17 Jun 2023 12:49)  POCT Blood Glucose.: 133 mg/dL (17 Jun 2023 08:35)    I&O's Summary      PHYSICAL EXAM:  Vital Signs Last 24 Hrs  T(C): 37.4 (17 Jun 2023 21:40), Max: 37.4 (17 Jun 2023 21:40)  T(F): 99.4 (17 Jun 2023 21:40), Max: 99.4 (17 Jun 2023 21:40)  HR: 71 (18 Jun 2023 04:39) (71 - 83)  BP: 128/68 (18 Jun 2023 04:39) (128/68 - 134/65)  BP(mean): --  RR: 18 (18 Jun 2023 04:39) (18 - 18)  SpO2: 94% (18 Jun 2023 04:39) (94% - 100%)    Parameters below as of 18 Jun 2023 04:39  Patient On (Oxygen Delivery Method): room air      GENERAL: No apparent distress.   HEAD:  Atraumatic, Normocephalic  EYES: EOMI, PERRLA, conjunctiva and sclera clear  NECK: Supple, no lymphadenopathy, no elevated JVP  CHEST/LUNG: Clear to auscultation bilateral and symmetric; No wheezes, rales, or rhonchi  HEART: S1 and S2 normal. Regular rate and rhythm; No murmurs, rubs, or gallops  ABDOMEN: Soft, non-tender, non-distended; normal bowel sounds  EXTREMITIES:  2+ peripheral pulses b/l, No clubbing, cyanosis, or edema  NEUROLOGY: A&O x 3, RLE 4/5, LLE 4/5, CN intact.   SKIN: No rashes or lesions    LABS:                          10.1   7.40  )-----------( 267      ( 17 Jun 2023 07:52 )             31.7     06-17    136  |  101  |  44<H>  ----------------------------<  109<H>  3.8   |  21<L>  |  2.01<H>    Ca    9.2      17 Jun 2023 07:52  Phos  3.3     06-17  Mg     2.1     06-17    TPro  7.2  /  Alb  3.2<L>  /  TBili  0.3  /  DBili  x   /  AST  11  /  ALT  25  /  AlkPhos  155<H>  06-17               PROGRESS NOTE:   Authored by Dr. Rocky Diaz / Internal Medicine Resident    Patient is a 67y old  Male who presents with a chief complaint of back pain (17 Jun 2023 15:48)      SUBJECTIVE / OVERNIGHT EVENTS: No overnight events. Davidn AOx2 (Self and year), [facility] [place] [facility]     ADDITIONAL REVIEW OF SYSTEMS:    MEDICATIONS  (STANDING):  acetaminophen     Tablet .. 975 milliGRAM(s) Oral every 8 hours  acetaminophen   IVPB .. 1000 milliGRAM(s) IV Intermittent once  chlorhexidine 4% Liquid 1 Application(s) Topical daily  dextrose 5%. 1000 milliLiter(s) (50 mL/Hr) IV Continuous <Continuous>  dextrose 5%. 1000 milliLiter(s) (100 mL/Hr) IV Continuous <Continuous>  dextrose 50% Injectable 25 Gram(s) IV Push once  dextrose 50% Injectable 12.5 Gram(s) IV Push once  dextrose 50% Injectable 25 Gram(s) IV Push once  dextrose Oral Gel 15 Gram(s) Oral once  ertapenem  IVPB      ertapenem  IVPB 1000 milliGRAM(s) IV Intermittent every 24 hours  folic acid 1 milliGRAM(s) Oral daily  glucagon  Injectable 1 milliGRAM(s) IntraMuscular once  heparin   Injectable 5000 Unit(s) SubCutaneous every 8 hours  insulin glargine Injectable (LANTUS) 10 Unit(s) SubCutaneous at bedtime  insulin lispro (ADMELOG) corrective regimen sliding scale   SubCutaneous three times a day before meals  insulin lispro (ADMELOG) corrective regimen sliding scale   SubCutaneous at bedtime  lactulose Syrup 10 Gram(s) Oral three times a day  lidocaine   4% Patch 3 Patch Transdermal every 24 hours  mycophenolate mofetil 500 milliGRAM(s) Oral two times a day  pantoprazole    Tablet 40 milliGRAM(s) Oral before breakfast  predniSONE   Tablet 10 milliGRAM(s) Oral daily  rifAXIMin 550 milliGRAM(s) Oral two times a day  tamsulosin 0.4 milliGRAM(s) Oral at bedtime  thiamine IVPB 500 milliGRAM(s) IV Intermittent every 8 hours  valGANciclovir 450 milliGRAM(s) Oral daily  vancomycin  IVPB 1500 milliGRAM(s) IV Intermittent every 24 hours    MEDICATIONS  (PRN):  HYDROmorphone   Solution 1 milliGRAM(s) Oral every 4 hours PRN Severe Pain (7 - 10)      CAPILLARY BLOOD GLUCOSE      POCT Blood Glucose.: 116 mg/dL (17 Jun 2023 21:44)  POCT Blood Glucose.: 176 mg/dL (17 Jun 2023 17:28)  POCT Blood Glucose.: 163 mg/dL (17 Jun 2023 12:49)  POCT Blood Glucose.: 133 mg/dL (17 Jun 2023 08:35)    I&O's Summary      PHYSICAL EXAM:  Vital Signs Last 24 Hrs  T(C): 37.4 (17 Jun 2023 21:40), Max: 37.4 (17 Jun 2023 21:40)  T(F): 99.4 (17 Jun 2023 21:40), Max: 99.4 (17 Jun 2023 21:40)  HR: 71 (18 Jun 2023 04:39) (71 - 83)  BP: 128/68 (18 Jun 2023 04:39) (128/68 - 134/65)  BP(mean): --  RR: 18 (18 Jun 2023 04:39) (18 - 18)  SpO2: 94% (18 Jun 2023 04:39) (94% - 100%)    Parameters below as of 18 Jun 2023 04:39  Patient On (Oxygen Delivery Method): room air      GENERAL: No apparent distress.   HEAD:  Atraumatic, Normocephalic  EYES: EOMI, PERRLA, conjunctiva and sclera clear  NECK: Supple, no lymphadenopathy, no elevated JVP  CHEST/LUNG: Clear to auscultation bilateral and symmetric; No wheezes, rales, or rhonchi  HEART: S1 and S2 normal. Regular rate and rhythm; No murmurs, rubs, or gallops  ABDOMEN: Soft, non-tender, non-distended; normal bowel sounds  EXTREMITIES:  2+ peripheral pulses b/l, No clubbing, cyanosis, or edema  NEUROLOGY: A&O x 3, RLE 4/5, LLE 4/5, CN intact.   SKIN: No rashes or lesions    LABS:                          10.1   7.40  )-----------( 267      ( 17 Jun 2023 07:52 )             31.7     06-17    136  |  101  |  44<H>  ----------------------------<  109<H>  3.8   |  21<L>  |  2.01<H>    Ca    9.2      17 Jun 2023 07:52  Phos  3.3     06-17  Mg     2.1     06-17    TPro  7.2  /  Alb  3.2<L>  /  TBili  0.3  /  DBili  x   /  AST  11  /  ALT  25  /  AlkPhos  155<H>  06-17               PROGRESS NOTE:   Authored by Dr. Rocky Diaz / Internal Medicine Resident    Patient is a 67y old  Male who presents with a chief complaint of back pain (17 Jun 2023 15:48)      SUBJECTIVE / OVERNIGHT EVENTS: No overnight events. Patient AOx2 (Self and year), said [place]     ADDITIONAL REVIEW OF SYSTEMS: Unable to assess due to AMS.    MEDICATIONS  (STANDING):  acetaminophen     Tablet .. 975 milliGRAM(s) Oral every 8 hours  acetaminophen   IVPB .. 1000 milliGRAM(s) IV Intermittent once  chlorhexidine 4% Liquid 1 Application(s) Topical daily  dextrose 5%. 1000 milliLiter(s) (50 mL/Hr) IV Continuous <Continuous>  dextrose 5%. 1000 milliLiter(s) (100 mL/Hr) IV Continuous <Continuous>  dextrose 50% Injectable 25 Gram(s) IV Push once  dextrose 50% Injectable 12.5 Gram(s) IV Push once  dextrose 50% Injectable 25 Gram(s) IV Push once  dextrose Oral Gel 15 Gram(s) Oral once  ertapenem  IVPB      ertapenem  IVPB 1000 milliGRAM(s) IV Intermittent every 24 hours  folic acid 1 milliGRAM(s) Oral daily  glucagon  Injectable 1 milliGRAM(s) IntraMuscular once  heparin   Injectable 5000 Unit(s) SubCutaneous every 8 hours  insulin glargine Injectable (LANTUS) 10 Unit(s) SubCutaneous at bedtime  insulin lispro (ADMELOG) corrective regimen sliding scale   SubCutaneous three times a day before meals  insulin lispro (ADMELOG) corrective regimen sliding scale   SubCutaneous at bedtime  lactulose Syrup 10 Gram(s) Oral three times a day  lidocaine   4% Patch 3 Patch Transdermal every 24 hours  mycophenolate mofetil 500 milliGRAM(s) Oral two times a day  pantoprazole    Tablet 40 milliGRAM(s) Oral before breakfast  predniSONE   Tablet 10 milliGRAM(s) Oral daily  rifAXIMin 550 milliGRAM(s) Oral two times a day  tamsulosin 0.4 milliGRAM(s) Oral at bedtime  thiamine IVPB 500 milliGRAM(s) IV Intermittent every 8 hours  valGANciclovir 450 milliGRAM(s) Oral daily  vancomycin  IVPB 1500 milliGRAM(s) IV Intermittent every 24 hours    MEDICATIONS  (PRN):  HYDROmorphone   Solution 1 milliGRAM(s) Oral every 4 hours PRN Severe Pain (7 - 10)      CAPILLARY BLOOD GLUCOSE      POCT Blood Glucose.: 116 mg/dL (17 Jun 2023 21:44)  POCT Blood Glucose.: 176 mg/dL (17 Jun 2023 17:28)  POCT Blood Glucose.: 163 mg/dL (17 Jun 2023 12:49)  POCT Blood Glucose.: 133 mg/dL (17 Jun 2023 08:35)    I&O's Summary      PHYSICAL EXAM:  Vital Signs Last 24 Hrs  T(C): 37.4 (17 Jun 2023 21:40), Max: 37.4 (17 Jun 2023 21:40)  T(F): 99.4 (17 Jun 2023 21:40), Max: 99.4 (17 Jun 2023 21:40)  HR: 71 (18 Jun 2023 04:39) (71 - 83)  BP: 128/68 (18 Jun 2023 04:39) (128/68 - 134/65)  BP(mean): --  RR: 18 (18 Jun 2023 04:39) (18 - 18)  SpO2: 94% (18 Jun 2023 04:39) (94% - 100%)    Parameters below as of 18 Jun 2023 04:39  Patient On (Oxygen Delivery Method): room air      GENERAL: No apparent distress. Talkative but nonsensical   HEAD:  Atraumatic, Normocephalic  EYES: EOMI  CHEST/LUNG: Clear to auscultation bilateral and symmetric; No wheezes, rales, or rhonchi  HEART: S1 and S2 normal. Regular rate and rhythm; No murmurs, rubs, or gallops  ABDOMEN: Soft, non-tender, globose  EXTREMITIES:  2+ peripheral pulses b/l, No clubbing, cyanosis, or edema  NEUROLOGY: A&O x 2    LABS:                          10.1   7.40  )-----------( 267      ( 17 Jun 2023 07:52 )             31.7     06-17    136  |  101  |  44<H>  ----------------------------<  109<H>  3.8   |  21<L>  |  2.01<H>    Ca    9.2      17 Jun 2023 07:52  Phos  3.3     06-17  Mg     2.1     06-17    TPro  7.2  /  Alb  3.2<L>  /  TBili  0.3  /  DBili  x   /  AST  11  /  ALT  25  /  AlkPhos  155<H>  06-17               PROGRESS NOTE:   Authored by Dr. Rocky Diaz / Internal Medicine Resident    Patient is a 67y old  Male who presents with a chief complaint of back pain (17 Jun 2023 15:48)      SUBJECTIVE / OVERNIGHT EVENTS: No overnight events. Patient AOx2 (Self and year), said [place]     ADDITIONAL REVIEW OF SYSTEMS: Unable to assess due to AMS.    MEDICATIONS  (STANDING):  acetaminophen     Tablet .. 975 milliGRAM(s) Oral every 8 hours  acetaminophen   IVPB .. 1000 milliGRAM(s) IV Intermittent once  chlorhexidine 4% Liquid 1 Application(s) Topical daily  dextrose 5%. 1000 milliLiter(s) (50 mL/Hr) IV Continuous <Continuous>  dextrose 5%. 1000 milliLiter(s) (100 mL/Hr) IV Continuous <Continuous>  dextrose 50% Injectable 25 Gram(s) IV Push once  dextrose 50% Injectable 12.5 Gram(s) IV Push once  dextrose 50% Injectable 25 Gram(s) IV Push once  dextrose Oral Gel 15 Gram(s) Oral once  ertapenem  IVPB      ertapenem  IVPB 1000 milliGRAM(s) IV Intermittent every 24 hours  folic acid 1 milliGRAM(s) Oral daily  glucagon  Injectable 1 milliGRAM(s) IntraMuscular once  heparin   Injectable 5000 Unit(s) SubCutaneous every 8 hours  insulin glargine Injectable (LANTUS) 10 Unit(s) SubCutaneous at bedtime  insulin lispro (ADMELOG) corrective regimen sliding scale   SubCutaneous three times a day before meals  insulin lispro (ADMELOG) corrective regimen sliding scale   SubCutaneous at bedtime  lactulose Syrup 10 Gram(s) Oral three times a day  lidocaine   4% Patch 3 Patch Transdermal every 24 hours  mycophenolate mofetil 500 milliGRAM(s) Oral two times a day  pantoprazole    Tablet 40 milliGRAM(s) Oral before breakfast  predniSONE   Tablet 10 milliGRAM(s) Oral daily  rifAXIMin 550 milliGRAM(s) Oral two times a day  tamsulosin 0.4 milliGRAM(s) Oral at bedtime  thiamine IVPB 500 milliGRAM(s) IV Intermittent every 8 hours  valGANciclovir 450 milliGRAM(s) Oral daily  vancomycin  IVPB 1500 milliGRAM(s) IV Intermittent every 24 hours    MEDICATIONS  (PRN):  HYDROmorphone   Solution 1 milliGRAM(s) Oral every 4 hours PRN Severe Pain (7 - 10)      CAPILLARY BLOOD GLUCOSE      POCT Blood Glucose.: 116 mg/dL (17 Jun 2023 21:44)  POCT Blood Glucose.: 176 mg/dL (17 Jun 2023 17:28)  POCT Blood Glucose.: 163 mg/dL (17 Jun 2023 12:49)  POCT Blood Glucose.: 133 mg/dL (17 Jun 2023 08:35)    I&O's Summary      PHYSICAL EXAM:  Vital Signs Last 24 Hrs  T(C): 37.4 (17 Jun 2023 21:40), Max: 37.4 (17 Jun 2023 21:40)  T(F): 99.4 (17 Jun 2023 21:40), Max: 99.4 (17 Jun 2023 21:40)  HR: 71 (18 Jun 2023 04:39) (71 - 83)  BP: 128/68 (18 Jun 2023 04:39) (128/68 - 134/65)  BP(mean): --  RR: 18 (18 Jun 2023 04:39) (18 - 18)  SpO2: 94% (18 Jun 2023 04:39) (94% - 100%)    Parameters below as of 18 Jun 2023 04:39  Patient On (Oxygen Delivery Method): room air      GENERAL: No apparent distress. Talkative but nonsensical   HEAD:  Atraumatic, Normocephalic  EYES: EOMI  CHEST/LUNG: Clear to auscultation bilateral and symmetric; No wheezes, rales, or rhonchi  HEART: S1 and S2 normal. Regular rate and rhythm; No murmurs, rubs, or gallops  ABDOMEN: Soft, non-tender, globose  EXTREMITIES:  2+ peripheral pulses b/l, No clubbing, cyanosis, or edema  NEUROLOGY: A&O x 2, follows simple instructions. Nonsensical, redirectable.    LABS:                          10.1   7.40  )-----------( 267      ( 17 Jun 2023 07:52 )             31.7     06-17    136  |  101  |  44<H>  ----------------------------<  109<H>  3.8   |  21<L>  |  2.01<H>    Ca    9.2      17 Jun 2023 07:52  Phos  3.3     06-17  Mg     2.1     06-17    TPro  7.2  /  Alb  3.2<L>  /  TBili  0.3  /  DBili  x   /  AST  11  /  ALT  25  /  AlkPhos  155<H>  06-17

## 2023-06-18 NOTE — BH CONSULTATION LIAISON ASSESSMENT NOTE - CURRENT MEDICATION
MEDICATIONS  (STANDING):  acetaminophen     Tablet .. 975 milliGRAM(s) Oral every 8 hours  acetaminophen   IVPB .. 1000 milliGRAM(s) IV Intermittent once  chlorhexidine 4% Liquid 1 Application(s) Topical daily  dextrose 5%. 1000 milliLiter(s) (50 mL/Hr) IV Continuous <Continuous>  dextrose 5%. 1000 milliLiter(s) (100 mL/Hr) IV Continuous <Continuous>  dextrose 50% Injectable 25 Gram(s) IV Push once  dextrose 50% Injectable 12.5 Gram(s) IV Push once  dextrose 50% Injectable 25 Gram(s) IV Push once  dextrose Oral Gel 15 Gram(s) Oral once  ertapenem  IVPB      ertapenem  IVPB 1000 milliGRAM(s) IV Intermittent every 24 hours  folic acid 1 milliGRAM(s) Oral daily  glucagon  Injectable 1 milliGRAM(s) IntraMuscular once  heparin   Injectable 5000 Unit(s) SubCutaneous every 8 hours  insulin glargine Injectable (LANTUS) 10 Unit(s) SubCutaneous at bedtime  insulin lispro (ADMELOG) corrective regimen sliding scale   SubCutaneous three times a day before meals  insulin lispro (ADMELOG) corrective regimen sliding scale   SubCutaneous at bedtime  lactulose Syrup 10 Gram(s) Oral three times a day  lidocaine   4% Patch 3 Patch Transdermal every 24 hours  mycophenolate mofetil 500 milliGRAM(s) Oral two times a day  pantoprazole    Tablet 40 milliGRAM(s) Oral before breakfast  predniSONE   Tablet 10 milliGRAM(s) Oral daily  rifAXIMin 550 milliGRAM(s) Oral two times a day  tamsulosin 0.4 milliGRAM(s) Oral at bedtime  thiamine IVPB 500 milliGRAM(s) IV Intermittent every 8 hours  valGANciclovir 450 milliGRAM(s) Oral daily    MEDICATIONS  (PRN):  HYDROmorphone   Solution 1 milliGRAM(s) Oral every 4 hours PRN Severe Pain (7 - 10)

## 2023-06-18 NOTE — PROGRESS NOTE ADULT - ATTENDING COMMENTS
67-year-old male with a history of HTN, HLD, obesity, T2DM, JEAN cirrhosis (post-OLT), HFpEF, CKD, MGUS, chronic anemia, SHENG, and ureteral stone.  Currently on MMF 500mg BID and prednisone 10mg daily post-transplant.  Presenting with back pain radiating to the right flank, fever, altered mental status (delirium), MISA on CKD, ureteral stone with hydronephrosis, mildly elevated liver chemistries, and recent CMV viremia.    Assessment:    -Possible acute infection contributing to altered mental status and elevated liver chemistries.  -Ureteral stone causing flank pain and hydronephrosis.  -Complications related to JEAN cirrhosis and post-transplant status.  -Chronic comorbidities including HFpEF, CKD, and chronic anemia.    Recommendations:    -Monitor clinical symptoms, vital signs, CBC, CMP, INR.  -Repeat panculture and complete infectious workup.  -Avoid psychotropic medications.  -Consider CT head.  -Consult with psychiatric team for further recommendations.  -Involve Transplant ID team for evaluation and management.  -Follow-up with Urology for the ureteral stone and hydronephrosis.  -Administer albumin 25% 100cc q8h x3 doses.  -Continue home immunosuppression regimen (MMF 500mg BID and prednisone 10mg daily).  -Reconsider everolimus initiation after resolution of the acute illness, if skin grafts are completed and wound healing is not a concern. 67-year-old male with a history of HTN, HLD, obesity, T2DM, JEAN cirrhosis (post-OLT), HFpEF, CKD, MGUS, chronic anemia, SHENG, and ureteral stone.  Currently on MMF 500mg BID and prednisone 10mg daily post-transplant.  Presenting with back pain radiating to the right flank, fever, altered mental status (delirium), MISA on CKD, ureteral stone with hydronephrosis, mildly elevated liver chemistries, and recent CMV viremia.    Assessment:    -Possible acute infection contributing to altered mental status and elevated liver chemistries.  -Ureteral stone causing flank pain and hydronephrosis.  -Complications related to JEAN cirrhosis and post-transplant status.  -Chronic comorbidities including HFpEF, CKD, and chronic anemia.    Recommendations:    -Monitor clinical symptoms, vital signs, CBC, CMP, INR.  -Repeat panculture and complete infectious workup.  -Consider CT head.  -Consult with psychiatric team for further recommendations.  -Involve Transplant ID team for evaluation and management.  -Follow-up with Urology for the ureteral stone and hydronephrosis.  -Administer albumin 25% 100cc q8h x3 doses.  -Continue home immunosuppression regimen (MMF 500mg BID and prednisone 10mg daily).  -Reconsider everolimus initiation after resolution of the acute illness, if skin grafts are completed and wound healing is not a concern.  -Psyche recommendation appreciated.

## 2023-06-18 NOTE — BH CONSULTATION LIAISON ASSESSMENT NOTE - SUMMARY
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  Underwent OLT on 7/2/2020, post op course c/b VRE bacteremia tx with linezolid and delirium likely in setting of CNI toxicity (FK discontinued/Everolimus initiated, given seroquel 25mg qhs and melatonin 3mg qhs) who presents for R flank pain/ abdominal pain and R hip pain, found to have + corynebacterium in 2/4 cultures. Psychiatry consulted for delirium.     Today, patient presents with symptoms consistent with delirium in the setting of gram-positive bacteremia and pain. Denies SI/HI/AVH. Recommend start seroquel 25mg qhs and melatonin 3mg qhs.      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  Underwent OLT on 7/2/2020, post op course c/b VRE bacteremia tx with linezolid and delirium likely in setting of CNI toxicity (FK discontinued/Everolimus initiated, given seroquel 25mg qhs and melatonin 3mg qhs) who presents for R flank pain/ abdominal pain and R hip pain, found to have + corynebacterium in 2/4 cultures. Psychiatry consulted for delirium.     Today, patient presents with symptoms consistent with delirium in the setting of gram-positive bacteremia and pain. Denies SI/HI/AVH. Recommend start seroquel 25mg qhs and melatonin 3mg qhs.      Plan  -start seroquel 25mg qhs and melatonin 3mg qhs  -Treat gram - positive bacteremia and pain per primary team  -Delirium precautions - maintain sleep/wake cycles, reduce nighttime disturbances, frequent reorientation, avoid anticholinergic medications, adequate pain management, reduce sedating medications during the day, family member at bedside if possible, assess for need for glasses and hearing aid (if applicable)  -PRN haldol 1 mg po/im/iv BID PRN for severe agitation, f/u QTc < 500  -Psych will continue to follow

## 2023-06-18 NOTE — BH CONSULTATION LIAISON ASSESSMENT NOTE - RISK ASSESSMENT
Risk factors: acute/chronic cognitive impairments, acute/chronic medical issues, not receiving treatment    Protective factors: no current SIIP/HIIP, no h/o SA/SIB, no h/o psych admissions, no access to weapons, no active substance abuse, with adult children, domiciled, intact marriage, social supports, positive therapeutic relationship, engaged in treatment, compliant with treatment, help-seeking behaviors    Overall, pt is a low risk of harm to self/others and does not require psychiatric admission for safety and stabilization.

## 2023-06-18 NOTE — PROGRESS NOTE ADULT - PROBLEM SELECTOR PLAN 1
Presents with R flank pain radiating to the right side of the abdomen; UA + LE and +WBC  CTAP - 6 x 3 mm stone seen within the left distal ureter with L hydronephrosis; asymm urinary bladder thickening; L>R perinephric stranding;  - No intervention per urology  - medical management for stone  - MRI spine without evidence of OM or discitis.   - Pain management per Chronic pain service.  - MR foot repeat pending, relatively incomplete showing possible OM of 4th and 5th metatarsal/cuneiform. Pt with increasing confusion, non-focal exam.  Likely due to lyrica iso CKD vs robaxin vs hospital-acquired, infection - bcx cleared  - CTH urgent ordered  - today mental status unchanged Pt with increasing confusion, non-focal exam.  Likely due to lyrica iso CKD vs robaxin vs hospital-acquired, infection - bcx cleared  - CTH urgent ordered; will need Ativan 1mg for agitation before CT; scheduled for 5pm  - today mental status unchanged  - unclear etiology, metabolic vs infections vs structural... patient on Ertapenem?   - Transplant ID following Pt with increasing confusion, non-focal exam.  Likely due to lyrica iso CKD vs robaxin vs hospital-acquired, infection - bcx cleared  - CTH urgent ordered; will need Ativan 1mg for agitation before CT; scheduled for 5pm  - today mental status unchanged  - unclear etiology, metabolic vs infections vs structural... patient on Ertapenem?   - Transplant ID following  - Psych consulted; Seroquel HS ordered; ECG performed QTc <500

## 2023-06-18 NOTE — PROVIDER CONTACT NOTE (OTHER) - ACTION/TREATMENT ORDERED:
no new orders
Give 5mg IM injection.
None ordered at this time. Pt has capacity to make decisions at this time
MD will come and speak with patient

## 2023-06-18 NOTE — BH CONSULTATION LIAISON ASSESSMENT NOTE - NSBHCONSULTFOLLOWAFTERCARE_PSY_A_CORE FT
OP psych f/u at [facility]- [phone]  [facility] clinic- [phone]

## 2023-06-18 NOTE — PROGRESS NOTE ADULT - PROBLEM SELECTOR PLAN 2
Pt with increasing confusion, non-focal exam.  Likely due to lyrica iso CKD vs robaxin vs hospital-acquired, infection - bcx cleared Presents with R flank pain radiating to the right side of the abdomen; UA + LE and +WBC  CTAP - 6 x 3 mm stone seen within the left distal ureter with L hydronephrosis; asymm urinary bladder thickening; L>R perinephric stranding;  - No intervention per urology  - medical management for stone  - MRI spine without evidence of OM or discitis.   - Pain management per Chronic pain service.  - MR foot repeat pending, relatively incomplete showing possible OM of 4th and 5th metatarsal/cuneiform.

## 2023-06-19 NOTE — BH CONSULTATION LIAISON PROGRESS NOTE - NSBHCONSULTFOLLOWAFTERCARE_PSY_A_CORE FT
OP psych f/u at [facility]- [phone]  [facility] clinic- [phone]

## 2023-06-19 NOTE — PROGRESS NOTE ADULT - ASSESSMENT
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 Pt states this pain began ~2 days ago, Denies recent trauma, travel, infections, or sick contacts. He denies N/F/V/C, CP, SOB, diarrhea, constipation, melena/ hematochezia, dysuria, and hematuria. State the R back/ flank pain moves around to the right side of the abdomen. The hip pain does not move to groin or down the extremity. States both these pains worsen with movement. Pt also reports L neck pain moving to shoulder; states it has been difficult to rotate head. Reports fatigue few weeks prior to onset of symptoms. Due to pain, he has addison unable to get out of bed. States pain mildly improves with Tylenol. Pt has not had any recent medication changes.     In the ED- Tmax 100,2, SBP 130s, HR 70- 80  Labs- WBC 7.64, Hgb 11.2, Plt 254, BUN 44, Cr 1.82, VBG- 7.35/37/33/20 lactate 1.0, UA + LE +  [id number]  Imaging  -CTAP 6 x 3 mm stone seen within the left distal ureter with L hydronephrosis; asymm urinary bladder thickening; L>R perinephric stranding; possible distal esophagitis; trace pelvic fluid with right pelvic surgical clip  -ED POCUS: right hip effusion  Meds- CTX (11pm), Vanc (1am), Fentanly 25, NS bolus 250 (12 Jun 2023 05:38)      #hydronephrosis, back pain, Right hip effusion  --UA (6/11, 6/12) - Large leukocyte esterase, 279 WBC, no bacteria or nitrite   --Afebrile - curve temperatures   --No leukocytosis - trend CBC  --MRSA/MSSA Nares - negative  --CT A/P (6/11) - 6X3 mm stone within left ureter  with left hydronephrosis  --RVP negative  --Blood culture (6/12) - +GNR, +pcr corynebacterium species. Repeat Cxs 6/13, 6/14 & 6/17 NGTD  --Urine Cx (6/11) - Normal urogenital ninfa  --MRI of Thoracic, cervical and lumbar spine:  No MR imaging findings strongly suspicious for septic discitis.  --Prior foot abscess from 01/22/23 was positive for Klebsiella PNA, bacteroides thetaiotamicron actinomyces odontolyticus strep mitis/oralis and strep agalactiae.     Recommendations:  --Continue Vancomycin 1550 mg Q24 hr, check trough pre 3rd  --Stop Ertapenem 1 gm Q24 hr.     --Follow blood culture  --MR foot- Limited findings do raise concern for osteomyelitis underlying a deep ulcer, involving the fifth metatarsal stump, fourth metatarsal base, and cuneiform lateral margin. Advise repeat MRI when the patient is able to confirm or refute these findings.       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 Pt states this pain began ~2 days ago, Denies recent trauma, travel, infections, or sick contacts. He denies N/F/V/C, CP, SOB, diarrhea, constipation, melena/ hematochezia, dysuria, and hematuria. State the R back/ flank pain moves around to the right side of the abdomen. The hip pain does not move to groin or down the extremity. States both these pains worsen with movement. Pt also reports L neck pain moving to shoulder; states it has been difficult to rotate head. Reports fatigue few weeks prior to onset of symptoms. Due to pain, he has addison unable to get out of bed. States pain mildly improves with Tylenol. Pt has not had any recent medication changes.     In the ED- Tmax 100,2, SBP 130s, HR 70- 80  Labs- WBC 7.64, Hgb 11.2, Plt 254, BUN 44, Cr 1.82, VBG- 7.35/37/33/20 lactate 1.0, UA + LE +  [id number]  Imaging  -CTAP 6 x 3 mm stone seen within the left distal ureter with L hydronephrosis; asymm urinary bladder thickening; L>R perinephric stranding; possible distal esophagitis; trace pelvic fluid with right pelvic surgical clip  -ED POCUS: right hip effusion  Meds- CTX (11pm), Vanc (1am), Fentanly 25, NS bolus 250 (12 Jun 2023 05:38)      #hydronephrosis, back pain, Right hip effusion  --UA (6/11, 6/12) - Large leukocyte esterase, 279 WBC, no bacteria or nitrite   --Afebrile - curve temperatures   --No leukocytosis - trend CBC  --MRSA/MSSA Nares - negative  --CT A/P (6/11) - 6X3 mm stone within left ureter  with left hydronephrosis  --RVP negative  --Blood culture (6/12) - +GNR, +pcr corynebacterium species. Repeat Cxs 6/13, 6/14 & 6/17 NGTD  --Urine Cx (6/11) - Normal urogenital ninfa  --MRI of Thoracic, cervical and lumbar spine:  No MR imaging findings strongly suspicious for septic discitis.  --Prior foot abscess from 01/22/23 was positive for Klebsiella PNA, bacteroides thetaiotamicron actinomyces odontolyticus strep mitis/oralis and strep agalactiae.     Recommendations:  --Continue Vancomycin 1550 mg Q24 hr, check trough pre 3rd  --Stop Ertapenem 1 gm Q24 hr.   --Follow urinalysis and culture  --Follow blood cultures  --MR foot- Limited findings do raise concern for osteomyelitis underlying a deep ulcer, involving the fifth metatarsal stump, fourth metatarsal base, and cuneiform lateral margin. Advise repeat MRI when the patient is able to confirm or refute these findings.       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 Pt states this pain began ~2 days ago, Denies recent trauma, travel, infections, or sick contacts. He denies N/F/V/C, CP, SOB, diarrhea, constipation, melena/ hematochezia, dysuria, and hematuria. State the R back/ flank pain moves around to the right side of the abdomen. The hip pain does not move to groin or down the extremity. States both these pains worsen with movement. Pt also reports L neck pain moving to shoulder; states it has been difficult to rotate head. Reports fatigue few weeks prior to onset of symptoms. Due to pain, he has addison unable to get out of bed. States pain mildly improves with Tylenol. Pt has not had any recent medication changes.     In the ED- Tmax 100,2, SBP 130s, HR 70- 80  Labs- WBC 7.64, Hgb 11.2, Plt 254, BUN 44, Cr 1.82, VBG- 7.35/37/33/20 lactate 1.0, UA + LE +  [id number]  Imaging  -CTAP 6 x 3 mm stone seen within the left distal ureter with L hydronephrosis; asymm urinary bladder thickening; L>R perinephric stranding; possible distal esophagitis; trace pelvic fluid with right pelvic surgical clip  -ED POCUS: right hip effusion  Meds- CTX (11pm), Vanc (1am), Fentanly 25, NS bolus 250 (12 Jun 2023 05:38)      #hydronephrosis, back pain, Right hip effusion  --UA (6/11, 6/12) - Large leukocyte esterase, 279 WBC, no bacteria or nitrite   --Afebrile - curve temperatures   --No leukocytosis - trend CBC  --MRSA/MSSA Nares - negative  --CT A/P (6/11) - 6X3 mm stone within left ureter  with left hydronephrosis  --RVP negative  --Blood culture (6/12) - +GNR, +pcr corynebacterium species. Repeat Cxs 6/13, 6/14 & 6/17 NGTD  --Urine Cx (6/11) - Normal urogenital ninfa  --MRI of Thoracic, cervical and lumbar spine:  No MR imaging findings strongly suspicious for septic discitis.  --Prior foot abscess from 01/22/23 was positive for Klebsiella PNA, bacteroides thetaiotamicron actinomyces odontolyticus strep mitis/oralis and strep agalactiae.     Recommendations:  --Vancomycin level from yesterday noted, adjusted to 1.25g IV Q24H today. Check trough prior to third dose.   --Stop Ertapenem 1 gm Q24 hr.   --Follow urinalysis and culture  --Follow blood cultures  --MR foot- Limited findings do raise concern for osteomyelitis underlying a deep ulcer, involving the fifth metatarsal stump, fourth metatarsal base, and cuneiform lateral margin. Advise repeat MRI when the patient is able to confirm or refute these findings.

## 2023-06-19 NOTE — BH CONSULTATION LIAISON PROGRESS NOTE - NSBHCHARTREVIEWVS_PSY_A_CORE FT
Vital Signs Last 24 Hrs  T(C): 36.7 (19 Jun 2023 13:48), Max: 37.2 (18 Jun 2023 19:30)  T(F): 98.1 (19 Jun 2023 13:48), Max: 98.9 (18 Jun 2023 19:30)  HR: 69 (19 Jun 2023 13:48) (69 - 86)  BP: 125/54 (19 Jun 2023 13:48) (107/51 - 143/55)  BP(mean): --  RR: 20 (19 Jun 2023 13:48) (19 - 20)  SpO2: 96% (19 Jun 2023 13:48) (95% - 98%)    Parameters below as of 19 Jun 2023 13:48  Patient On (Oxygen Delivery Method): room air

## 2023-06-19 NOTE — PROGRESS NOTE ADULT - PROBLEM SELECTOR PLAN 2
Presents with R flank pain radiating to the right side of the abdomen; UA + LE and +WBC  CTAP - 6 x 3 mm stone seen within the left distal ureter with L hydronephrosis; asymm urinary bladder thickening; L>R perinephric stranding;  - No intervention per urology  - medical management for stone  - MRI spine without evidence of OM or discitis.   - Pain management per Chronic pain service.  - MR foot repeat pending, relatively incomplete showing possible OM of 4th and 5th metatarsal/cuneiform.

## 2023-06-19 NOTE — BH CONSULTATION LIAISON PROGRESS NOTE - CURRENT MEDICATION
MEDICATIONS  (STANDING):  acetaminophen     Tablet .. 975 milliGRAM(s) Oral every 8 hours  acetaminophen   IVPB .. 1000 milliGRAM(s) IV Intermittent once  albumin human 25% IVPB 100 milliLiter(s) IV Intermittent every 8 hours  chlorhexidine 4% Liquid 1 Application(s) Topical daily  dextrose 5%. 1000 milliLiter(s) (50 mL/Hr) IV Continuous <Continuous>  dextrose 5%. 1000 milliLiter(s) (100 mL/Hr) IV Continuous <Continuous>  dextrose 50% Injectable 25 Gram(s) IV Push once  dextrose 50% Injectable 12.5 Gram(s) IV Push once  dextrose 50% Injectable 25 Gram(s) IV Push once  dextrose Oral Gel 15 Gram(s) Oral once  ertapenem  IVPB 1000 milliGRAM(s) IV Intermittent every 24 hours  ertapenem  IVPB      folic acid 1 milliGRAM(s) Oral daily  glucagon  Injectable 1 milliGRAM(s) IntraMuscular once  heparin   Injectable 5000 Unit(s) SubCutaneous every 8 hours  insulin glargine Injectable (LANTUS) 10 Unit(s) SubCutaneous at bedtime  insulin lispro (ADMELOG) corrective regimen sliding scale   SubCutaneous three times a day before meals  insulin lispro (ADMELOG) corrective regimen sliding scale   SubCutaneous at bedtime  lactulose Syrup 10 Gram(s) Oral three times a day  lidocaine   4% Patch 3 Patch Transdermal every 24 hours  melatonin 3 milliGRAM(s) Oral at bedtime  mycophenolate mofetil 500 milliGRAM(s) Oral two times a day  pantoprazole    Tablet 40 milliGRAM(s) Oral before breakfast  predniSONE   Tablet 10 milliGRAM(s) Oral daily  QUEtiapine 25 milliGRAM(s) Oral at bedtime  rifAXIMin 550 milliGRAM(s) Oral two times a day  tamsulosin 0.4 milliGRAM(s) Oral at bedtime  thiamine IVPB 500 milliGRAM(s) IV Intermittent every 8 hours  valGANciclovir 450 milliGRAM(s) Oral daily  vancomycin  IVPB 1250 milliGRAM(s) IV Intermittent every 24 hours    MEDICATIONS  (PRN):

## 2023-06-19 NOTE — PROGRESS NOTE ADULT - SUBJECTIVE AND OBJECTIVE BOX
Interval Events:   No acute events noted, however unable to obtain full HPI due to underlying mental status.    ROS:   Unable to fully obtain ROS.    Hospital Medications:  acetaminophen     Tablet .. 975 milliGRAM(s) Oral every 8 hours  acetaminophen   IVPB .. 1000 milliGRAM(s) IV Intermittent once  albumin human 25% IVPB 100 milliLiter(s) IV Intermittent every 8 hours  chlorhexidine 4% Liquid 1 Application(s) Topical daily  dextrose 5%. 1000 milliLiter(s) IV Continuous <Continuous>  dextrose 5%. 1000 milliLiter(s) IV Continuous <Continuous>  dextrose 50% Injectable 25 Gram(s) IV Push once  dextrose 50% Injectable 12.5 Gram(s) IV Push once  dextrose 50% Injectable 25 Gram(s) IV Push once  dextrose Oral Gel 15 Gram(s) Oral once  ertapenem  IVPB      ertapenem  IVPB 1000 milliGRAM(s) IV Intermittent every 24 hours  folic acid 1 milliGRAM(s) Oral daily  glucagon  Injectable 1 milliGRAM(s) IntraMuscular once  heparin   Injectable 5000 Unit(s) SubCutaneous every 8 hours  HYDROmorphone   Solution 1 milliGRAM(s) Oral every 4 hours PRN  insulin glargine Injectable (LANTUS) 10 Unit(s) SubCutaneous at bedtime  insulin lispro (ADMELOG) corrective regimen sliding scale   SubCutaneous three times a day before meals  insulin lispro (ADMELOG) corrective regimen sliding scale   SubCutaneous at bedtime  lactulose Syrup 10 Gram(s) Oral three times a day  lidocaine   4% Patch 3 Patch Transdermal every 24 hours  melatonin 3 milliGRAM(s) Oral at bedtime  mycophenolate mofetil 500 milliGRAM(s) Oral two times a day  pantoprazole    Tablet 40 milliGRAM(s) Oral before breakfast  predniSONE   Tablet 10 milliGRAM(s) Oral daily  QUEtiapine 25 milliGRAM(s) Oral at bedtime  rifAXIMin 550 milliGRAM(s) Oral two times a day  tamsulosin 0.4 milliGRAM(s) Oral at bedtime  thiamine IVPB 500 milliGRAM(s) IV Intermittent every 8 hours  valGANciclovir 450 milliGRAM(s) Oral daily  vancomycin  IVPB 1250 milliGRAM(s) IV Intermittent every 24 hours      PHYSICAL EXAM:   Vital Signs:  Vital Signs Last 24 Hrs  T(C): 36.3 (19 Jun 2023 04:10), Max: 37.3 (18 Jun 2023 12:49)  T(F): 97.4 (19 Jun 2023 04:10), Max: 99.2 (18 Jun 2023 12:49)  HR: 77 (19 Jun 2023 01:00) (74 - 86)  BP: 107/51 (19 Jun 2023 04:10) (107/51 - 143/55)  BP(mean): --  RR: 19 (19 Jun 2023 04:10) (19 - 20)  SpO2: 98% (19 Jun 2023 04:10) (94% - 98%)    Parameters below as of 19 Jun 2023 04:10  Patient On (Oxygen Delivery Method): room air      Daily     Daily     GENERAL: no acute distress  NEURO: not fully alert/oriented  HEENT: NCAT, no conjunctival pallor appreciated  CHEST: no respiratory distress, no accessory muscle use  CARDIAC: regular rate, +S1/S2  ABDOMEN: soft, nontender, no rebound or guarding  EXTREMITIES: warm, well perfused  SKIN: no lesions noted    LABS: reviewed                        9.8    7.55  )-----------( 282      ( 19 Jun 2023 06:50 )             30.9     06-19    136  |  100  |  47<H>  ----------------------------<  102<H>  4.1   |  19<L>  |  2.35<H>    Ca    9.2      19 Jun 2023 06:54  Phos  4.3     06-19  Mg     2.3     06-19    TPro  7.1  /  Alb  3.5  /  TBili  0.2  /  DBili  x   /  AST  16  /  ALT  18  /  AlkPhos  137<H>  06-19    LIVER FUNCTIONS - ( 19 Jun 2023 06:54 )  Alb: 3.5 g/dL / Pro: 7.1 g/dL / ALK PHOS: 137 U/L / ALT: 18 U/L / AST: 16 U/L / GGT: x             Interval Diagnostic Studies: see sunrise for full report

## 2023-06-19 NOTE — PROGRESS NOTE ADULT - NS ATTEND AMEND GEN_ALL_CORE FT
Patient seen and examined  Case discussed with NP Anyaegubunum    Patient with corynebacterium noted in blood cultures- ? real vs procurement contaminant  Repeat blood cultures x2 sets  Begin Vancomycin 1250mg q 24hr  check vanco trough prior to third dose  ? soource  check TTE    Back pain  pain management consult  CT or MRI of spine with sedation  image with CT or MRI the left foot TMA site to look for residual osteo    Discussed with medicine team    Saeed Menezes MD  Can be called via Teams  After 5pm/weekends [phone]
patient seen and examined  Case discussed with NP Senthil    Patient remains in sever pain- pain consult in process  CT of spine with evidence of osteoarthritis and Cs[ine area with possible early cord compression  Would consider Neurosurgery consult to evaluate for possible spine surgery    corynebacterium x2sets of + blood cultures  repeat blood cultures   check TTE  right foot TMA site is well healed and pink tissue  unclear source of bacteremia at present    Saeed Menezes MD  Can be called via Teams  After 5pm/weekends [phone]
Patient seen and examined    Case discussed with ZBIGNIEW Ndiaye    I agree with her interval history exam and plans as noted above    Patient with ? osteo seen on MRI of TMA region  the wound is healed but right lateral scar area is apposed without erythema but does not feel completely ehaled  ? source of corynebacterium  Would ask Podiatry to evaluate    Back pain on pain regimen  Would ask pain management to follow up  pt seems to be getting a bit confused on the pain medications and probably needs a decreased dose of pain meds  If persists would repeat head CT    Neurosurgery consult noted  conservative management recommended    Saeed Menezes MD  Can be called via Teams  After 5pm/weekends [phone]
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 Pt states this pain began ~2 days ago,    Found to have 4/4 bottles with Corynebacterium  Unclear if contaminant or true pathogen but would favor coverage  RLE Foot or scalp skin biopsy are possible initial foci  TTE (6/14) moderate AS & mild AR, trace MR, trace NC  Lumbar Spine MRI without findings of infection  R Foot without overt superficial evidence of infection  MRI R Foot was very limited but questioning presence of OM  Would pursue repeat MRI of foot  In the interim we can stop Ertapenem as cultures are otherwise unrevealing and its possible its contributing to his encephalopathy  If persistent encephalopathy would pursue MRI Head  Would continue IV Vancomycin for corynebacterium coverage    I will continue to follow. Please feel free to contact me with any further questions.    Eusebio Pérez M.D.  [facility] Division of Infectious Disease  8AM-5PM Monday - Friday: Available on [employer] Teams  After Hours and Holidays (or if no response on [employer] Teams): Please contact the Infectious Diseases Office at [phone]    The above assessment and plan were discussed with medicine team

## 2023-06-19 NOTE — PROGRESS NOTE ADULT - PROBLEM SELECTOR PLAN 3
BCx positive for corynebacterium  - c/w vancomycin  - c/w ertapenem  - repeat BCx  - TTE - no evidence of endocarditis, aortic valve anatomy not evaluated. BCx positive for corynebacterium  - c/w vancomycin  - repeat BCx  - TTE - no evidence of endocarditis, aortic valve anatomy not evaluated.

## 2023-06-19 NOTE — PROGRESS NOTE ADULT - ATTENDING COMMENTS
68 y/o M with a history notable for JEAN s/p OLT 7/2020 (on MMF + pred), multiple prior infections including MDRO UTIs, bacteremia, CMV viremia presenting with F and R sided back pain found to have L sided ureteral stone, and corynebacterium bacteremia. C/c/b worsening encephalopathy  #Corynebacterium bacteremia  #s/p OLT  #CKD  #CMV  #Back Pain  #Encephalopathy  - bacteremia of unclear source most likely previous TMA site, MRI poor but ?OM, pending repeat. On Vanc, levels supratherapeutic, decreased to 1250mg   - blood cultures positive for corynebacterium, repeat BCx NG final and NGTD, TTE no infective endocarditis   - urine cultures growing GNR, s/p tx w Ertapenem, now DC'd on discussion w Txp ID d/t encephalopathy  - mental status now A&Ox1-2 (baseline A&Ox3), redirectable, non focal neuro exam, possibly related to bacteremia vs UTI vs delirium, ammonia level wnl, psych consult, and ID follow up.       - D/C'd ertapenem due to possible neurotoxicity.        - CTH showing possibly old R thalamic lacunar infarct, however it was not seen on prior CTH from 6/15. Unlikely that this would be etiology of current presentation, however will pursue MR brain       - Avoid further psychotropic meds  - Continue MMF + pred, holding everolimus, restart in future as per hepatology  - MRI spine with cervical and lumbar DJD, c/w pain control  - rest of plan as above .

## 2023-06-19 NOTE — BH CONSULTATION LIAISON PROGRESS NOTE - NSBHFUPINTERVALHXFT_PSY_A_CORE
pt poor historian, confused, mostly lethargic. Pt aware he is in a hospital, couldn't elaborate details. Pt currently calm. pt started on seroquel.

## 2023-06-19 NOTE — PROGRESS NOTE ADULT - SUBJECTIVE AND OBJECTIVE BOX
Follow Up:    Bacteriemia    Interval History:  Seen when leaving for CT Head  Remains disoriented, afebrile with no leukocytosis  Reported to be combative overnight    REVIEW OF SYSTEMS  [ x ] ROS unobtainable because: AMS  [  ] All other systems negative except as noted below    Constitutional:  [ ] fever [ ] chills  [ ] weight loss  [ ] weakness  Skin:  [ ] rash [ ] phlebitis	  Eyes: [ ] icterus [ ] pain  [ ] discharge	  ENMT: [ ] sore throat  [ ] thrush [ ] ulcers [ ] exudates  Respiratory: [ ] dyspnea [ ] hemoptysis [ ] cough [ ] sputum	  Cardiovascular:  [ ] chest pain [ ] palpitations [ ] edema	  Gastrointestinal:  [ ] nausea [ ] vomiting [ ] diarrhea [ ] constipation [ ] pain	  Genitourinary:  [ ] dysuria [ ] frequency [ ] hematuria [ ] discharge [ ] flank pain  [ ] incontinence  Musculoskeletal:  [ ] myalgias [ ] arthralgias [ ] arthritis  [ ] back pain  Neurological:  [ ] headache [ ] seizures  [ ] confusion/altered mental status    Allergies  codeine (Anaphylaxis)        ANTIMICROBIALS:  rifAXIMin 550 two times a day  valGANciclovir 450 daily  vancomycin  IVPB 1250 every 24 hours      OTHER MEDS:  MEDICATIONS  (STANDING):  acetaminophen     Tablet .. 975 every 8 hours  acetaminophen   IVPB .. 1000 once  dextrose 50% Injectable 25 once  dextrose 50% Injectable 12.5 once  dextrose 50% Injectable 25 once  dextrose Oral Gel 15 once  glucagon  Injectable 1 once  heparin   Injectable 5000 every 8 hours  insulin glargine Injectable (LANTUS) 10 at bedtime  insulin lispro (ADMELOG) corrective regimen sliding scale  three times a day before meals  insulin lispro (ADMELOG) corrective regimen sliding scale  at bedtime  lactulose Syrup 10 three times a day  melatonin 3 at bedtime  mycophenolate mofetil 500 two times a day  pantoprazole    Tablet 40 before breakfast  predniSONE   Tablet 10 daily  QUEtiapine 25 at bedtime  tamsulosin 0.4 at bedtime      Vital Signs Last 24 Hrs  T(C): 36.7 (2023 13:48), Max: 37.2 (2023 19:30)  T(F): 98.1 (2023 13:48), Max: 98.9 (2023 19:30)  HR: 69 (2023 13:48) (69 - 86)  BP: 125/54 (2023 13:48) (107/51 - 143/55)  BP(mean): --  RR: 20 (2023 13:48) (19 - 20)  SpO2: 96% (2023 13:48) (95% - 98%)    Parameters below as of 2023 13:48  Patient On (Oxygen Delivery Method): room air        PHYSICAL EXAMINATION:  General: Alert and Awake,   HEENT: PERRL, EOMI  Neck: Supple  Cardiac: RRR, No M/R/G  Resp: CTAB, No Wh/Rh/Ra  Abdomen: NBS, NT/ND, No HSM, No rigidity or guarding  MSK: No LE edema. No Calf tenderness  : No Beasley  Skin: No rashes or lesions. Skin is warm and dry to the touch.   Neuro: Alert and Awake. CN 2-12 Grossly intact. Moves all four extremities spontaneously.  Psych: disoriented                          9.8    7.55  )-----------( 282      ( 2023 06:50 )             30.9           136  |  100  |  47<H>  ----------------------------<  102<H>  4.1   |  19<L>  |  2.35<H>    Ca    9.2      2023 06:54  Phos  4.3       Mg     2.3         TPro  7.1  /  Alb  3.5  /  TBili  0.2  /  DBili  x   /  AST  16  /  ALT  18  /  AlkPhos  137<H>        Urinalysis Basic - ( 2023 13:18 )    Color: Yellow / Appearance: Slightly Turbid / S.017 / pH: x  Gluc: x / Ketone: Negative  / Bili: Negative / Urobili: Negative   Blood: x / Protein: 100 mg/dl / Nitrite: Negative   Leuk Esterase: Large / RBC: 24 /hpf /  /HPF   Sq Epi: x / Non Sq Epi: x / Bacteria: Negative        MICROBIOLOGY:  v  .Blood Blood-Peripheral  23   No growth to date.  --  --      .Blood Blood-Peripheral  23   No Growth Final  --  --      .Blood Blood  23   No Growth Final  --  --      .Blood Blood  23   Growth in aerobic and anaerobic bottles: Corynebacterium striatum group  Sent out to reference laboratory for susceptibility testing.  See accession # [id number] for report.  --    Growth in aerobic bottle: Gram Positive Rods  Growth in anaerobic bottle: Gram Positive Rods      .Blood Blood  23   Growth in aerobic and anaerobic bottles: Corynebacterium striatum group  "Susceptibilities not performed"  ***Blood Panel PCR results on this specimen are available  approximately 3 hours after the Gram stain result.***  Gram stain, PCR, and/or culture results may not always  correspond due to difference in methodologies.  ************************************************************  This PCR assay was performed by multiplex PCR. This  Assay tests for 66 bacterial and resistance gene targets.  Please refer to the [facility] test directory  at [email or web address] for details.  --  Blood Culture PCR      Clean Catch Clean Catch (Midstream)  23   <10,000 CFU/mL Normal Urogenital Shantel  --  --          Rapid RVP Result: NotDetec ( @ 11:37)  Rapid RVP Result: NotDetec ( @ 09:00)  CMVPCR Log: NotDetec Exw84HA/mL ( @ 07:13)        RADIOLOGY:    <The imaging below has been reviewed and visualized by me independently. Findings as detailed in report below>    < from: CT Head No Cont (23 @ 14:34) >  IMPRESSION:  Age-appropriate involutional change and microvascular   ischemic disease. Right thalamic lacunar infarct which appears old though   not well seen on the prior.    --- End of Report ---    < end of copied text >  < from: MR Foot No Cont, Right (06.15.23 @ 22:55) >  IMPRESSION:  1.  Nondiagnostic exam. Few sequences were obtained prior to early    of disc due to patient discomfort. Extensive motion artifact is   also present.    2.  Limited findings do raise concern for osteomyelitis underlying a deep   ulcer, involving the fifth metatarsal stump, fourth metatarsal base, and   cuneiform lateral margin. Advise repeat MRI when the patient is able to   confirm or refute these findings.    --- End of Report ---      < end of copied text >

## 2023-06-19 NOTE — PROGRESS NOTE ADULT - ASSESSMENT
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 Found to have + corynebacterium in 2/4 cultures. MRI showing - no evidence of OM/discitis, degenerative changes present. Pending MRI Foot. BCx 6/13 NGTD. TTE without evidence of endocarditis/moderate AR/mildAS, normal LVEF.  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 Found to have + corynebacterium in 2/4 cultures. MRI showing - no evidence of OM/discitis, degenerative changes present.  BCx 6/13 NGTD. TTE without evidence of endocarditis/moderate AR/mildAS, normal LVEF. Incomplete rt foot MRI showing possible OM. Course complicated by delirium 2/2 infection/drug-induced/pain.

## 2023-06-19 NOTE — PROGRESS NOTE ADULT - PROBLEM SELECTOR PLAN 5
- FeNa 0.9%, s/p IV contrast, however baseline around 1.8-2. Likely at baseline.   Cr 1.82  likely prerenal/ poor PO intake. S/p 250 bolus in the ED Vancomycin-induced vs AIN  - FeNa 0.9%, s/p IV contrast, however baseline around 1.8-2. Likely at baseline.   - Creatinine now 2.3  - Urine Studies.   - Kidney & Bladder US dehydration due to AMS vs Vancomycin-induced vs AIN  - FeNA 0.6%  - Creatinine now 2.3  - Urine Studies.   - Kidney & Bladder US  - gentle hydration. dehydration due to AMS vs Vancomycin-induced vs AIN  - FeNA 0.6%  - Creatinine now 2.3  - Urine Studies.   - Kidney & Bladder US  - gentle hydration - albumin 100cc x 3

## 2023-06-19 NOTE — PROGRESS NOTE ADULT - PROBLEM SELECTOR PLAN 1
Pt with increasing confusion, non-focal exam.  Likely due to lyrica iso CKD vs robaxin vs hospital-acquired, infection - bcx cleared  - CTH urgent ordered; will need Ativan 1mg for agitation before CT; scheduled for 5pm  - today mental status unchanged  - unclear etiology, metabolic vs infections vs structural... patient on Ertapenem?   - Transplant ID following  - Psych consulted; Seroquel HS ordered; ECG performed QTc <500 Pt with increasing confusion, non-focal exam.  Likely due to lyrica iso CKD vs robaxin vs hospital-acquired, infection - bcx cleared, concern for ertapenem contribution.   - CTH urgent ordered, administering ativan prior for claustrophobia.   - mental status similar less agitation.   - Transplant ID following  - Psych consulted; Seroquel HS ordered; ECG performed QTc <500 Pt with increasing confusion, non-focal exam.  Likely due to lyrica iso CKD vs robaxin vs hospital-acquired, infection - bcx cleared, concern for ertapenem contribution.   - CTH urgent ordered, administering ativan prior for claustrophobia.   - mental status similar less agitation.   - Transplant ID following  - Psych consulted; Seroquel HS ordered; ECG performed QTc <500  - on rifaximin/lactulose for possible but low likelihood HE Pt with increasing confusion, non-focal exam.  Likely due to lyrica iso CKD vs robaxin vs hospital-acquired, infection - bcx cleared, concern for ertapenem contribution.   - CTH without evidence of acute pathology  - d/c ertapenem  - mental status similar less agitation.   - Transplant ID following  - Psych consulted; Seroquel HS ordered; ECG performed QTc <500  - on rifaximin/lactulose for possible but low likelihood HE

## 2023-06-19 NOTE — BH CONSULTATION LIAISON PROGRESS NOTE - NSBHASSESSMENTFT_PSY_ALL_CORE
Summary (brief):	Pt is a 68 y/o man, retired from construction management, with 4 kids, lives with his spouse and 3 adult kids, with no PPHx, no h/o inpt psychiatric hospitalizations, no SA/SIB, PMHx of IDDM, HLD, obesity, HFpEF with mild LV diastolic dysfunction, MGUS, chronic anemia with a history of duodenal ulcer 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  Underwent OLT on 7/2/2020, post op course c/b VRE bacteremia tx with linezolid and delirium likely in setting of CNI toxicity (FK discontinued/Everolimus initiated, given seroquel 25mg qhs and melatonin 3mg qhs) who presents for R flank pain/ abdominal pain and R hip pain, found to have + corynebacterium in 2/4 cultures. Psychiatry consulted for delirium.     Today, patient presents with symptoms consistent with delirium in the setting of gram-positive bacteremia and pain. Denies SI/HI/AVH. Recommend start seroquel 25mg qhs and melatonin 3mg qhs.      Plan  -cont seroquel 25mg qhs and melatonin 3mg qhs  -Treat gram - positive bacteremia and pain per primary team  -Delirium precautions - maintain sleep/wake cycles, reduce nighttime disturbances, frequent reorientation, avoid anticholinergic medications, adequate pain management, reduce sedating medications during the day, family member at bedside if possible, assess for need for glasses and hearing aid (if applicable)  -PRN haldol 1 mg po/im/iv BID PRN for severe agitation, f/u QTc < 500  -Psych will continue to follow

## 2023-06-19 NOTE — PROGRESS NOTE ADULT - ASSESSMENT
67 year old male with history of HTN, HLD, obesity, T2DM, JEAN cirrhosis s/p OLT 7/2/2020 [currently on  BID and prednisone 10mg daily; intolerant of CNIs due to neurotoxicity; intolerant of everolimus due to prior workup of skin grafts and osteomyelitis], still on Valcyte due to CMV PCR detected 1/18/23, prior VRE bacteremia, s/p multiple toe amputations 9/2022 2/2 infections, osteomyelitis s/p right TMA 1/25/2023, HFpEF, mild LV dysfunction, MGUS, chronic anemia, SHENG, CKD, h/o ureteral stone who now  presents to the ED with back pain and right flank pain.  Patient presents with back pain radiating to his right flank, which has been progressively worsening over the past several days.  Upon admission, he states he is "unaware of his medications," stating his son puts them in a pill box and then "he takes them."  Most recent hepatology clinic note 4/20/2023 describes current regimen of MMF 500mg BID and prednisone 10mg daily.    # Fever  # Altered mental status: more compatible with delirium than HE  # MISA on CKD  # Ureteral stone with associated hydronephrosis  # BCx+ Corynebacterium on 6/11/2023: likely contaminant  # Mildly elevated liver chemistries: most likely related to acute infection  # JEAN cirrhosis s/p LT 7/2/2023 [currently on  BID and prednisone 10mg daily; intolerant of CNIs due to neurotoxicity; intolerant of everolimus due to prior workup of skin grafts and osteomyelitis]  # Recent CMV viremia    Recommendations:  -trend clinical symptoms, exam findings, vital signs, CBC, CMP, INR  -f/u complete infectious workup -- please repeat UA and UCx  -avoid psychotropic medications  -check CT head  -appreciate recommendations from psych  -Transplant ID --> broad spectrum ABx and foot MRI given recent osteo  -follow-up Urology recommendations  -recommend albumin 25% 100cc q8h x3 doses  -continue home immunosuppression regimen MMF 500mg BID and prednisone 10mg daily  -if skin grafts completed and without foreseeable issues with wound healing, will consider re-initiation of everolimus in the future after acute illness resolves    Note incomplete until finalized by attending signature/attestation.    Gallo Rodriguez  GI/Hepatology Fellow    MONDAY-FRIDAY 8AM-5PM:  Pager# [phone] ([facility]) or [phone] ([facility])    NON-URGENT CONSULTS:  Please email [email or web address]  AT NIGHT AND ON WEEKENDS:  Contact on-call GI fellow via answering service ([phone]) from 5pm-8am and on weekends/holidays

## 2023-06-19 NOTE — PROGRESS NOTE ADULT - SUBJECTIVE AND OBJECTIVE BOX
PROGRESS NOTE:   Authored by Morgan Fairchild MD   Patient is a 67y old  Male who presents with a chief complaint of back pain (18 Jun 2023 15:24)      SUBJECTIVE / OVERNIGHT EVENTS:  No acute events overnight.     ADDITIONAL REVIEW OF SYSTEMS:    MEDICATIONS  (STANDING):  acetaminophen     Tablet .. 975 milliGRAM(s) Oral every 8 hours  acetaminophen   IVPB .. 1000 milliGRAM(s) IV Intermittent once  albumin human 25% IVPB 100 milliLiter(s) IV Intermittent every 8 hours  chlorhexidine 4% Liquid 1 Application(s) Topical daily  dextrose 5%. 1000 milliLiter(s) (50 mL/Hr) IV Continuous <Continuous>  dextrose 5%. 1000 milliLiter(s) (100 mL/Hr) IV Continuous <Continuous>  dextrose 50% Injectable 25 Gram(s) IV Push once  dextrose 50% Injectable 12.5 Gram(s) IV Push once  dextrose 50% Injectable 25 Gram(s) IV Push once  dextrose Oral Gel 15 Gram(s) Oral once  ertapenem  IVPB      ertapenem  IVPB 1000 milliGRAM(s) IV Intermittent every 24 hours  folic acid 1 milliGRAM(s) Oral daily  glucagon  Injectable 1 milliGRAM(s) IntraMuscular once  heparin   Injectable 5000 Unit(s) SubCutaneous every 8 hours  insulin glargine Injectable (LANTUS) 10 Unit(s) SubCutaneous at bedtime  insulin lispro (ADMELOG) corrective regimen sliding scale   SubCutaneous three times a day before meals  insulin lispro (ADMELOG) corrective regimen sliding scale   SubCutaneous at bedtime  lactulose Syrup 10 Gram(s) Oral three times a day  lidocaine   4% Patch 3 Patch Transdermal every 24 hours  melatonin 3 milliGRAM(s) Oral at bedtime  mycophenolate mofetil 500 milliGRAM(s) Oral two times a day  pantoprazole    Tablet 40 milliGRAM(s) Oral before breakfast  predniSONE   Tablet 10 milliGRAM(s) Oral daily  QUEtiapine 25 milliGRAM(s) Oral at bedtime  rifAXIMin 550 milliGRAM(s) Oral two times a day  tamsulosin 0.4 milliGRAM(s) Oral at bedtime  thiamine IVPB 500 milliGRAM(s) IV Intermittent every 8 hours  valGANciclovir 450 milliGRAM(s) Oral daily  vancomycin  IVPB 1250 milliGRAM(s) IV Intermittent every 24 hours    MEDICATIONS  (PRN):  HYDROmorphone   Solution 1 milliGRAM(s) Oral every 4 hours PRN Severe Pain (7 - 10)      CAPILLARY BLOOD GLUCOSE      POCT Blood Glucose.: 131 mg/dL (18 Jun 2023 12:35)  POCT Blood Glucose.: 127 mg/dL (18 Jun 2023 08:27)    I&O's Summary      PHYSICAL EXAM:  Vital Signs Last 24 Hrs  T(C): 36.3 (19 Jun 2023 04:10), Max: 37.3 (18 Jun 2023 12:49)  T(F): 97.4 (19 Jun 2023 04:10), Max: 99.2 (18 Jun 2023 12:49)  HR: 77 (19 Jun 2023 01:00) (74 - 86)  BP: 107/51 (19 Jun 2023 04:10) (107/51 - 143/55)  BP(mean): --  RR: 19 (19 Jun 2023 04:10) (19 - 20)  SpO2: 98% (19 Jun 2023 04:10) (94% - 98%)    Parameters below as of 19 Jun 2023 04:10  Patient On (Oxygen Delivery Method): room air        GENERAL: No apparent distress. Talkative but nonsensical   HEAD:  Atraumatic, Normocephalic  EYES: EOMI  CHEST/LUNG: Clear to auscultation bilateral and symmetric; No wheezes, rales, or rhonchi  HEART: S1 and S2 normal. Regular rate and rhythm; No murmurs, rubs, or gallops  ABDOMEN: Soft, non-tender, globose  EXTREMITIES:  2+ peripheral pulses b/l, No clubbing, cyanosis, or edema  NEUROLOGY: A&O x 2, follows simple instructions. Nonsensical, redirectable.      LABS:                        9.8    7.55  )-----------( 282      ( 19 Jun 2023 06:50 )             30.9     06-18    134<L>  |  101  |  44<H>  ----------------------------<  131<H>  4.3   |  19<L>  |  2.11<H>    Ca    9.2      18 Jun 2023 11:24  Phos  3.6     06-18  Mg     2.2     06-18    TPro  7.3  /  Alb  3.4  /  TBili  0.3  /  DBili  x   /  AST  13  /  ALT  22  /  AlkPhos  151<H>  06-18              Culture - Blood (collected 17 Jun 2023 18:40)  Source: .Blood Blood-Peripheral  Preliminary Report (19 Jun 2023 02:01):    No growth to date.    Culture - Blood (collected 17 Jun 2023 18:40)  Source: .Blood Blood-Peripheral  Preliminary Report (19 Jun 2023 02:01):    No growth to date.        RADIOLOGY & ADDITIONAL TESTS:  Lab Results Reviewed   Imaging Reviewed  Electrocardiogram Reviewed   PROGRESS NOTE:   Authored by Morgan Fairchild MD   Patient is a 67y old  Male who presents with a chief complaint of back pain (18 Jun 2023 15:24)      SUBJECTIVE / OVERNIGHT EVENTS:  Pt given 5 of haldol @ 5PM yesterday, 1 mg of IV ativan prior to CT.    Pt unable to tolerate CT head or foot MRI    Pt AOx2 this AM, following commands, however unable to maintain attention.     ADDITIONAL REVIEW OF SYSTEMS:    MEDICATIONS  (STANDING):  acetaminophen     Tablet .. 975 milliGRAM(s) Oral every 8 hours  acetaminophen   IVPB .. 1000 milliGRAM(s) IV Intermittent once  albumin human 25% IVPB 100 milliLiter(s) IV Intermittent every 8 hours  chlorhexidine 4% Liquid 1 Application(s) Topical daily  dextrose 5%. 1000 milliLiter(s) (50 mL/Hr) IV Continuous <Continuous>  dextrose 5%. 1000 milliLiter(s) (100 mL/Hr) IV Continuous <Continuous>  dextrose 50% Injectable 25 Gram(s) IV Push once  dextrose 50% Injectable 12.5 Gram(s) IV Push once  dextrose 50% Injectable 25 Gram(s) IV Push once  dextrose Oral Gel 15 Gram(s) Oral once  ertapenem  IVPB      ertapenem  IVPB 1000 milliGRAM(s) IV Intermittent every 24 hours  folic acid 1 milliGRAM(s) Oral daily  glucagon  Injectable 1 milliGRAM(s) IntraMuscular once  heparin   Injectable 5000 Unit(s) SubCutaneous every 8 hours  insulin glargine Injectable (LANTUS) 10 Unit(s) SubCutaneous at bedtime  insulin lispro (ADMELOG) corrective regimen sliding scale   SubCutaneous three times a day before meals  insulin lispro (ADMELOG) corrective regimen sliding scale   SubCutaneous at bedtime  lactulose Syrup 10 Gram(s) Oral three times a day  lidocaine   4% Patch 3 Patch Transdermal every 24 hours  melatonin 3 milliGRAM(s) Oral at bedtime  mycophenolate mofetil 500 milliGRAM(s) Oral two times a day  pantoprazole    Tablet 40 milliGRAM(s) Oral before breakfast  predniSONE   Tablet 10 milliGRAM(s) Oral daily  QUEtiapine 25 milliGRAM(s) Oral at bedtime  rifAXIMin 550 milliGRAM(s) Oral two times a day  tamsulosin 0.4 milliGRAM(s) Oral at bedtime  thiamine IVPB 500 milliGRAM(s) IV Intermittent every 8 hours  valGANciclovir 450 milliGRAM(s) Oral daily  vancomycin  IVPB 1250 milliGRAM(s) IV Intermittent every 24 hours    MEDICATIONS  (PRN):  HYDROmorphone   Solution 1 milliGRAM(s) Oral every 4 hours PRN Severe Pain (7 - 10)      CAPILLARY BLOOD GLUCOSE      POCT Blood Glucose.: 131 mg/dL (18 Jun 2023 12:35)  POCT Blood Glucose.: 127 mg/dL (18 Jun 2023 08:27)    I&O's Summary      PHYSICAL EXAM:  Vital Signs Last 24 Hrs  T(C): 36.3 (19 Jun 2023 04:10), Max: 37.3 (18 Jun 2023 12:49)  T(F): 97.4 (19 Jun 2023 04:10), Max: 99.2 (18 Jun 2023 12:49)  HR: 77 (19 Jun 2023 01:00) (74 - 86)  BP: 107/51 (19 Jun 2023 04:10) (107/51 - 143/55)  BP(mean): --  RR: 19 (19 Jun 2023 04:10) (19 - 20)  SpO2: 98% (19 Jun 2023 04:10) (94% - 98%)    Parameters below as of 19 Jun 2023 04:10  Patient On (Oxygen Delivery Method): room air        GENERAL: No apparent distress.   HEAD:  Atraumatic, Normocephalic  EYES: EOMI. Pupils 2mm b/l and reactive to light. left eye coloboma.   CHEST/LUNG: Clear to auscultation bilateral and symmetric; No wheezes, rales, or rhonchi  HEART: S1 and S2 normal. Regular rate and rhythm; No murmurs, rubs, or gallops  ABDOMEN: Soft, non-tender, globose  EXTREMITIES:  2+ peripheral pulses b/l, No clubbing, cyanosis, or edema  NEUROLOGY: A&O x 2, follows simple instructions. Nonsensical, redirectable. Difficulty maintaining attention. UE 5/5, LE 4/5 equal.      LABS:                        9.8    7.55  )-----------( 282      ( 19 Jun 2023 06:50 )             30.9     06-18    134<L>  |  101  |  44<H>  ----------------------------<  131<H>  4.3   |  19<L>  |  2.11<H>    Ca    9.2      18 Jun 2023 11:24  Phos  3.6     06-18  Mg     2.2     06-18    TPro  7.3  /  Alb  3.4  /  TBili  0.3  /  DBili  x   /  AST  13  /  ALT  22  /  AlkPhos  151<H>  06-18              Culture - Blood (collected 17 Jun 2023 18:40)  Source: .Blood Blood-Peripheral  Preliminary Report (19 Jun 2023 02:01):    No growth to date.    Culture - Blood (collected 17 Jun 2023 18:40)  Source: .Blood Blood-Peripheral  Preliminary Report (19 Jun 2023 02:01):    No growth to date.        RADIOLOGY & ADDITIONAL TESTS:  Lab Results Reviewed   Imaging Reviewed  Electrocardiogram Reviewed

## 2023-06-19 NOTE — CHART NOTE - NSCHARTNOTEFT_GEN_A_CORE
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 Pt has been with AMS over weekend w/ periods of agitation, now w/ somnolence. Lyrica stopped days ago and over 48 hrs since last dose Dilaudid, doubtful this is related to either medication.     Recommend holding opioids until mental status improves.   Recommend Tylenol around the clock, consider NSAIDs as well.  When mental status improves can consider trialing low dose Tramadol however first recheck EKG to ensure QTc no longer prolonged (488 today) and check Na (Tramadol can worsen hyponatremia)  Continue Lidoderm - apply one of the patches over old surgical scar on right abdomen  Warm/cool packs for comfort  Incentive Spirometer  PT per primary team    Signing off    [facility]  [phone]

## 2023-06-20 NOTE — PROVIDER CONTACT NOTE (CRITICAL VALUE NOTIFICATION) - ACTION/TREATMENT ORDERED:
continue with current treatment
MD made aware with orderto stop Vanco, ordered blood work to add vanco trough for AM labs
MD notified . Informed Pt is on Broad spectrum ABX
MD made aware, as per MD patient is on appropriate antibiotic, blood culture to be drawn in the am

## 2023-06-20 NOTE — PROGRESS NOTE ADULT - ASSESSMENT
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 Found to have + corynebacterium in 2/4 cultures. MRI showing - no evidence of OM/discitis, degenerative changes present.  BCx 6/13 NGTD. TTE without evidence of endocarditis/moderate AR/mildAS, normal LVEF. Incomplete rt foot MRI showing possible OM. Course complicated by delirium 2/2 infection/drug-induced/pain. 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 Found to have + corynebacterium in 2/4 cultures. MRI showing - no evidence of OM/discitis, degenerative changes present.  BCx 6/13 NGTD. TTE without evidence of endocarditis/moderate AR/mildAS, normal LVEF. Incomplete rt foot MRI showing possible OM. Course complicated by delirium 2/2 infection/drug-induced/pain. Delirium not significantly improved.

## 2023-06-20 NOTE — PROGRESS NOTE ADULT - SUBJECTIVE AND OBJECTIVE BOX
PROGRESS NOTE:   Authored by Morgan Fairchild MD   Patient is a 67y old  Male who presents with a chief complaint of back pain (2023 15:10)      SUBJECTIVE / OVERNIGHT EVENTS:  No acute events overnight.     ADDITIONAL REVIEW OF SYSTEMS:    MEDICATIONS  (STANDING):  acetaminophen     Tablet .. 975 milliGRAM(s) Oral every 8 hours  acetaminophen   IVPB .. 1000 milliGRAM(s) IV Intermittent once  chlorhexidine 4% Liquid 1 Application(s) Topical daily  dextrose 5%. 1000 milliLiter(s) (100 mL/Hr) IV Continuous <Continuous>  dextrose 5%. 1000 milliLiter(s) (50 mL/Hr) IV Continuous <Continuous>  dextrose 50% Injectable 25 Gram(s) IV Push once  dextrose 50% Injectable 25 Gram(s) IV Push once  dextrose 50% Injectable 12.5 Gram(s) IV Push once  dextrose Oral Gel 15 Gram(s) Oral once  folic acid 1 milliGRAM(s) Oral daily  glucagon  Injectable 1 milliGRAM(s) IntraMuscular once  heparin   Injectable 5000 Unit(s) SubCutaneous every 8 hours  insulin glargine Injectable (LANTUS) 10 Unit(s) SubCutaneous at bedtime  insulin lispro (ADMELOG) corrective regimen sliding scale   SubCutaneous three times a day before meals  insulin lispro (ADMELOG) corrective regimen sliding scale   SubCutaneous at bedtime  lactulose Syrup 10 Gram(s) Oral three times a day  lidocaine   4% Patch 3 Patch Transdermal every 24 hours  melatonin 3 milliGRAM(s) Oral at bedtime  mycophenolate mofetil 500 milliGRAM(s) Oral two times a day  pantoprazole    Tablet 40 milliGRAM(s) Oral before breakfast  predniSONE   Tablet 10 milliGRAM(s) Oral daily  QUEtiapine 25 milliGRAM(s) Oral at bedtime  rifAXIMin 550 milliGRAM(s) Oral two times a day  tamsulosin 0.4 milliGRAM(s) Oral at bedtime  valGANciclovir 450 milliGRAM(s) Oral daily    MEDICATIONS  (PRN):      CAPILLARY BLOOD GLUCOSE      POCT Blood Glucose.: 134 mg/dL (2023 23:16)  POCT Blood Glucose.: 124 mg/dL (2023 17:23)  POCT Blood Glucose.: 125 mg/dL (2023 12:41)  POCT Blood Glucose.: 123 mg/dL (2023 08:43)    I&O's Summary    2023 07:01  -  2023 07:00  --------------------------------------------------------  IN: 0 mL / OUT: 600 mL / NET: -600 mL        PHYSICAL EXAM:  Vital Signs Last 24 Hrs  T(C): 36.4 (:40), Max: 37 (2023 20:14)  T(F): 97.6 (:40), Max: 98.6 (2023 20:14)  HR: 64 (:40) (64 - 74)  BP: 128/52 (:40) (114/54 - 128/52)  BP(mean): --  RR: 20 (:40) (20 - 20)  SpO2: 96% (:40) (94% - 96%)    Parameters below as of 2023 01:40  Patient On (Oxygen Delivery Method): room air        GENERAL: No apparent distress.   HEAD:  Atraumatic, Normocephalic  EYES: EOMI. Pupils 2mm b/l and reactive to light. left eye coloboma.   CHEST/LUNG: Clear to auscultation bilateral and symmetric; No wheezes, rales, or rhonchi  HEART: S1 and S2 normal. Regular rate and rhythm; No murmurs, rubs, or gallops  ABDOMEN: Soft, non-tender, globose  EXTREMITIES:  2+ peripheral pulses b/l, No clubbing, cyanosis, or edema  NEUROLOGY: A&O x 2, follows simple instructions. Nonsensical, redirectable. Difficulty maintaining attention. UE 5/5, LE 4/5 equal.    LABS:                        9.8    7.55  )-----------( 282      ( 2023 06:50 )             30.9     06-19    136  |  100  |  47<H>  ----------------------------<  102<H>  4.1   |  19<L>  |  2.35<H>    Ca    9.2      2023 06:54  Phos  4.3     -  Mg     2.3     -    TPro  7.1  /  Alb  3.5  /  TBili  0.2  /  DBili  x   /  AST  16  /  ALT  18  /  AlkPhos  137<H>            Urinalysis Basic - ( 2023 13:18 )    Color: Yellow / Appearance: Slightly Turbid / S.017 / pH: x  Gluc: x / Ketone: Negative  / Bili: Negative / Urobili: Negative   Blood: x / Protein: 100 mg/dl / Nitrite: Negative   Leuk Esterase: Large / RBC: 24 /hpf /  /HPF   Sq Epi: x / Non Sq Epi: x / Bacteria: Negative        Culture - Blood (collected 2023 15:40)  Source: .Blood Blood-Peripheral  Preliminary Report (2023 20:02):    No growth to date.    Culture - Blood (collected 2023 18:40)  Source: .Blood Blood-Peripheral  Preliminary Report (2023 02:01):    No growth to date.    Culture - Blood (collected 2023 18:40)  Source: .Blood Blood-Peripheral  Preliminary Report (2023 02:01):    No growth to date.        RADIOLOGY & ADDITIONAL TESTS:  Lab Results Reviewed   Imaging Reviewed  Electrocardiogram Reviewed   PROGRESS NOTE:   Authored by Morgan Fairchild MD   Patient is a 67y old  Male who presents with a chief complaint of back pain (2023 15:10)      SUBJECTIVE / OVERNIGHT EVENTS:  No acute events overnight.     Significant improvement in mental status, AOx3. Able to state reasons for hospitalization.    ADDITIONAL REVIEW OF SYSTEMS:    MEDICATIONS  (STANDING):  acetaminophen     Tablet .. 975 milliGRAM(s) Oral every 8 hours  acetaminophen   IVPB .. 1000 milliGRAM(s) IV Intermittent once  chlorhexidine 4% Liquid 1 Application(s) Topical daily  dextrose 5%. 1000 milliLiter(s) (100 mL/Hr) IV Continuous <Continuous>  dextrose 5%. 1000 milliLiter(s) (50 mL/Hr) IV Continuous <Continuous>  dextrose 50% Injectable 25 Gram(s) IV Push once  dextrose 50% Injectable 25 Gram(s) IV Push once  dextrose 50% Injectable 12.5 Gram(s) IV Push once  dextrose Oral Gel 15 Gram(s) Oral once  folic acid 1 milliGRAM(s) Oral daily  glucagon  Injectable 1 milliGRAM(s) IntraMuscular once  heparin   Injectable 5000 Unit(s) SubCutaneous every 8 hours  insulin glargine Injectable (LANTUS) 10 Unit(s) SubCutaneous at bedtime  insulin lispro (ADMELOG) corrective regimen sliding scale   SubCutaneous three times a day before meals  insulin lispro (ADMELOG) corrective regimen sliding scale   SubCutaneous at bedtime  lactulose Syrup 10 Gram(s) Oral three times a day  lidocaine   4% Patch 3 Patch Transdermal every 24 hours  melatonin 3 milliGRAM(s) Oral at bedtime  mycophenolate mofetil 500 milliGRAM(s) Oral two times a day  pantoprazole    Tablet 40 milliGRAM(s) Oral before breakfast  predniSONE   Tablet 10 milliGRAM(s) Oral daily  QUEtiapine 25 milliGRAM(s) Oral at bedtime  rifAXIMin 550 milliGRAM(s) Oral two times a day  tamsulosin 0.4 milliGRAM(s) Oral at bedtime  valGANciclovir 450 milliGRAM(s) Oral daily    MEDICATIONS  (PRN):      CAPILLARY BLOOD GLUCOSE      POCT Blood Glucose.: 134 mg/dL (2023 23:16)  POCT Blood Glucose.: 124 mg/dL (2023 17:23)  POCT Blood Glucose.: 125 mg/dL (2023 12:41)  POCT Blood Glucose.: 123 mg/dL (2023 08:43)    I&O's Summary    2023 07:01  -  2023 07:00  --------------------------------------------------------  IN: 0 mL / OUT: 600 mL / NET: -600 mL        PHYSICAL EXAM:  Vital Signs Last 24 Hrs  T(C): 36.4 (:40), Max: 37 (2023 20:14)  T(F): 97.6 (:40), Max: 98.6 (2023 20:14)  HR: 64 (:40) (64 - 74)  BP: 128/52 (:40) (114/54 - 128/52)  BP(mean): --  RR: 20 (:40) (20 - 20)  SpO2: 96% (:40) (94% - 96%)    Parameters below as of 2023 01:40  Patient On (Oxygen Delivery Method): room air        GENERAL: No apparent distress.   HEAD:  Atraumatic, Normocephalic  EYES: EOMI. Pupils 2mm b/l and reactive to light. left eye coloboma.   CHEST/LUNG: Clear to auscultation bilateral and symmetric; No wheezes, rales, or rhonchi  HEART: S1 and S2 normal. Regular rate and rhythm; No murmurs, rubs, or gallops  ABDOMEN: Soft, non-tender, globose  EXTREMITIES:  2+ peripheral pulses b/l, No clubbing, cyanosis, or edema  NEUROLOGY: A&O x 3, follows simple instructions. Maintaining attention. UE , LE 4 equal.    LABS:                        9.8    7.55  )-----------( 282      ( 2023 06:50 )             30.9     06-19    136  |  100  |  47<H>  ----------------------------<  102<H>  4.1   |  19<L>  |  2.35<H>    Ca    9.2      2023 06:54  Phos  4.3     -  Mg     2.3     -    TPro  7.1  /  Alb  3.5  /  TBili  0.2  /  DBili  x   /  AST  16  /  ALT  18  /  AlkPhos  137<H>  -          Urinalysis Basic - ( 2023 13:18 )    Color: Yellow / Appearance: Slightly Turbid / S.017 / pH: x  Gluc: x / Ketone: Negative  / Bili: Negative / Urobili: Negative   Blood: x / Protein: 100 mg/dl / Nitrite: Negative   Leuk Esterase: Large / RBC: 24 /hpf /  /HPF   Sq Epi: x / Non Sq Epi: x / Bacteria: Negative        Culture - Blood (collected 2023 15:40)  Source: .Blood Blood-Peripheral  Preliminary Report (2023 20:02):    No growth to date.    Culture - Blood (collected 2023 18:40)  Source: .Blood Blood-Peripheral  Preliminary Report (2023 02:01):    No growth to date.    Culture - Blood (collected 2023 18:40)  Source: .Blood Blood-Peripheral  Preliminary Report (2023 02:01):    No growth to date.        RADIOLOGY & ADDITIONAL TESTS:  Lab Results Reviewed   Imaging Reviewed  Electrocardiogram Reviewed

## 2023-06-20 NOTE — PROGRESS NOTE ADULT - PROBLEM SELECTOR PLAN 1
Pt with increasing confusion, non-focal exam.  Likely due to lyrica iso CKD vs robaxin vs hospital-acquired, infection - bcx cleared, concern for ertapenem contribution.   - CTH without evidence of acute pathology  - d/c ertapenem  - mental status similar less agitation.   - Transplant ID following  - Psych consulted; Seroquel HS ordered; ECG performed QTc <500  - on rifaximin/lactulose for possible but low likelihood HE Pt with increasing confusion, non-focal exam.  Likely due to lyrica iso CKD vs robaxin vs hospital-acquired, infection - bcx cleared, concern for ertapenem contribution.   - CTH without evidence of acute pathology  - d/c ertapenem  - mental status similar less agitation.   - Transplant ID following  - Psych consulted; Seroquel HS ordered; ECG performed QTc <500  - on rifaximin/lactulose for possible but low likelihood HE  - Significant improvement - likely ertapenem induced

## 2023-06-20 NOTE — PROGRESS NOTE ADULT - SUBJECTIVE AND OBJECTIVE BOX
Interval Events:   No acute events overnight.  Patient without acute symptoms at this time.  A little somnolent this morning, however A/Ox3 and knows why he came into the hospital.    ROS:   12 point review of systems performed and negative except otherwise noted in HPI.    Hospital Medications:  acetaminophen     Tablet .. 975 milliGRAM(s) Oral every 8 hours  acetaminophen   IVPB .. 1000 milliGRAM(s) IV Intermittent once  chlorhexidine 4% Liquid 1 Application(s) Topical daily  dextrose 5%. 1000 milliLiter(s) (100 mL/Hr) IV Continuous <Continuous>  dextrose 5%. 1000 milliLiter(s) (50 mL/Hr) IV Continuous <Continuous>  dextrose 50% Injectable 25 Gram(s) IV Push once  dextrose 50% Injectable 12.5 Gram(s) IV Push once  dextrose 50% Injectable 25 Gram(s) IV Push once  dextrose Oral Gel 15 Gram(s) Oral once  folic acid 1 milliGRAM(s) Oral daily  glucagon  Injectable 1 milliGRAM(s) IntraMuscular once  heparin   Injectable 5000 Unit(s) SubCutaneous every 8 hours  insulin glargine Injectable (LANTUS) 10 Unit(s) SubCutaneous at bedtime  insulin lispro (ADMELOG) corrective regimen sliding scale   SubCutaneous at bedtime  insulin lispro (ADMELOG) corrective regimen sliding scale   SubCutaneous three times a day before meals  lactulose Syrup 10 Gram(s) Oral three times a day  lidocaine   4% Patch 3 Patch Transdermal every 24 hours  melatonin 3 milliGRAM(s) Oral at bedtime  mycophenolate mofetil 500 milliGRAM(s) Oral two times a day  pantoprazole    Tablet 40 milliGRAM(s) Oral before breakfast  predniSONE   Tablet 10 milliGRAM(s) Oral daily  QUEtiapine 25 milliGRAM(s) Oral at bedtime  rifAXIMin 550 milliGRAM(s) Oral two times a day  tamsulosin 0.4 milliGRAM(s) Oral at bedtime  valGANciclovir 450 milliGRAM(s) Oral daily    MAR over past 24 hours:  acetaminophen     Tablet ..   975 milliGRAM(s) Oral (06-20-23 @ 05:52)   975 milliGRAM(s) Oral (06-19-23 @ 21:41)   975 milliGRAM(s) Oral (06-19-23 @ 13:33)    albumin human 25% IVPB   50 mL/Hr IV Intermittent (06-19-23 @ 23:01)   50 mL/Hr IV Intermittent (06-19-23 @ 14:54)    chlorhexidine 4% Liquid   1 Application(s) Topical (06-19-23 @ 11:59)    folic acid   1 milliGRAM(s) Oral (06-19-23 @ 11:58)    heparin   Injectable   5000 Unit(s) SubCutaneous (06-20-23 @ 05:52)   5000 Unit(s) SubCutaneous (06-19-23 @ 21:41)   5000 Unit(s) SubCutaneous (06-19-23 @ 13:33)    insulin glargine Injectable (LANTUS)   10 Unit(s) SubCutaneous (06-19-23 @ 23:17)    lactulose Syrup   10 Gram(s) Oral (06-20-23 @ 05:53)   10 Gram(s) Oral (06-19-23 @ 21:43)   10 Gram(s) Oral (06-19-23 @ 13:34)    lidocaine   4% Patch   3 Patch Transdermal (06-19-23 @ 13:33)    LORazepam   Injectable   0.5 milliGRAM(s) IV Push (06-19-23 @ 13:49)    melatonin   3 milliGRAM(s) Oral (06-19-23 @ 23:18)    mycophenolate mofetil   500 milliGRAM(s) Oral (06-20-23 @ 07:00)   500 milliGRAM(s) Oral (06-19-23 @ 20:17)    pantoprazole    Tablet   40 milliGRAM(s) Oral (06-20-23 @ 06:00)    predniSONE   Tablet   10 milliGRAM(s) Oral (06-20-23 @ 05:53)    QUEtiapine   25 milliGRAM(s) Oral (06-19-23 @ 23:19)    rifAXIMin   550 milliGRAM(s) Oral (06-20-23 @ 07:00)   550 milliGRAM(s) Oral (06-19-23 @ 20:16)    tamsulosin   0.4 milliGRAM(s) Oral (06-19-23 @ 21:43)    thiamine IVPB   105 mL/Hr IV Intermittent (06-20-23 @ 05:50)   105 mL/Hr IV Intermittent (06-19-23 @ 21:42)   105 mL/Hr IV Intermittent (06-19-23 @ 13:32)    valGANciclovir   450 milliGRAM(s) Oral (06-19-23 @ 11:59)      Home Medications:  Last Order Reconciliation Date: 06-12-23 @ 07:26 (Admission Reconciliation)  CellCept 500 mg oral tablet: 1 tab(s) orally 2 times a day  Flomax 0.4 mg oral capsule: 1 tab(s) orally once a day (at bedtime)  folic acid 1 mg oral tablet: 1 tab(s) orally once a day  Lantus 100 units/mL subcutaneous solution: 14 unit(s) subcutaneous once a day (at bedtime)  pantoprazole 40 mg oral delayed release tablet: 1 tab(s) orally once a day (before a meal)  Please dispense ONE R lower extremity prefabricated AFO walker: Please dispense ONE R lower extremity prefabricated AFO walker  Ind: s/p R foot TMA with LEAH  Disp: 1 prefabricated AFO walker   predniSONE 10 mg oral tablet: 1 tab(s) orally once a day  psyllium 3.4 g/7 g oral powder for reconstitution: 1 packet(s) orally once a day  senna leaf extract oral tablet: 2 tab(s) orally once a day (at bedtime), As needed, Constipation  sertraline 100 mg oral tablet: 1 tab(s) orally once a day  Valcyte 450 mg oral tablet: 1 tab(s) orally once a day (in the morning)  Wound Care Orders: Please apply wound vac with black foam to the R lateral foot graft @75mmHg 3x a week, thank you!    If Wound Vac Fails, please apply Wet to Dry    PHYSICAL EXAM:   Vital Signs last 24 hours:  T(F): 97.6 (06-20-23 @ 01:40), Max: 98.6 (06-19-23 @ 20:14)  HR: 64 (06-20-23 @ 01:40) (64 - 74)  BP: 128/52 (06-20-23 @ 01:40) (114/54 - 128/52)  BP(mean): --  ABP: --  ABP(mean): --  RR: 20 (06-20-23 @ 01:40) (20 - 20)  SpO2: 96% (06-20-23 @ 01:40) (94% - 96%)    I&Os:    06-19-23 @ 07:01  -  06-20-23 @ 07:00  --------------------------------------------------------  IN:  Total IN: 0 mL    OUT:    Voided (mL): 600 mL  Total OUT: 600 mL    Total NET: -600 mL        BMI (kg/m2): 32.1 (06-12-23 @ 16:24)  GENERAL: no acute distress  NEURO: alert  HEENT: NCAT, no conjunctival pallor appreciated  CHEST: no respiratory distress, no accessory muscle use  CARDIAC: regular rate, +S1/S2  ABDOMEN: soft, nontender, no rebound or guarding  EXTREMITIES: warm, well perfused  SKIN: no lesions noted    DIAGNOSTICS:  WBC      Hg       PLT      Na       K        CO2     BUN      Cr       ALT      AST      TB       ALP  6.26     10.4     293      ------   ------   ------   ------   ------   ------   ------   ------   ------   06-20-23 @ 07:29  ------   ------   ------   139      3.8      21       44       2.30     16       11       0.2      119      06-20-23 @ 07:24  ------   ------   ------   136      4.1      19       47       2.35     18       16       0.2      137      06-19-23 @ 06:54  7.55     9.8      282      ------   ------   ------   ------   ------   ------   ------   ------   ------   06-19-23 @ 06:50  7.56     10.4     285      134      4.3      19       44       2.11     22       13       0.3      151      06-18-23 @ 11:24    PT             INR            MELDwith  MELDw/o  13.9           1.20           --             --             06-11-23 @ 18:05  13.2           1.15           --             --             01-25-23 @ 07:17  14.0           1.21           24             19             01-24-23 @ 06:59  16.3           1.40           --             --             01-22-23 @ 17:10  11.9           1.03           --             --             12-29-22 @ 06:21

## 2023-06-20 NOTE — PROGRESS NOTE ADULT - PROBLEM SELECTOR PLAN 5
dehydration due to AMS vs Vancomycin-induced vs AIN  - FeNA 0.6%  - Creatinine now 2.3  - Urine Studies.   - Kidney & Bladder US  - gentle hydration - albumin 100cc x 3

## 2023-06-20 NOTE — PROGRESS NOTE ADULT - ASSESSMENT
67 year old male with history of HTN, HLD, obesity, T2DM, JEAN cirrhosis s/p OLT 7/2/2020 [currently on  BID and prednisone 10mg daily; intolerant of CNIs due to neurotoxicity; intolerant of everolimus due to prior workup of skin grafts and osteomyelitis], still on Valcyte due to CMV PCR detected 1/18/23, prior VRE bacteremia, s/p multiple toe amputations 9/2022 2/2 infections, osteomyelitis s/p right TMA 1/25/2023, HFpEF, mild LV dysfunction, MGUS, chronic anemia, SHENG, CKD, h/o ureteral stone who now  presents to the ED with back pain and right flank pain.  Patient presents with back pain radiating to his right flank, which has been progressively worsening over the past several days.  Upon admission, he states he is "unaware of his medications," stating his son puts them in a pill box and then "he takes them."  Most recent hepatology clinic note 4/20/2023 describes current regimen of MMF 500mg BID and prednisone 10mg daily.    # Fever, improved  # Altered mental status: more compatible with delirium than HE  # MISA on CKD  # Ureteral stone with associated hydronephrosis  # BCx+ Corynebacterium on 6/11/2023: likely contaminant  # Mildly elevated liver chemistries: most likely related to acute infection  # JENA cirrhosis s/p LT 7/2/2023 [currently on  BID and prednisone 10mg daily; intolerant of CNIs due to neurotoxicity; intolerant of everolimus due to prior workup of skin grafts and osteomyelitis]  # Recent CMV viremia  -CT head 6/19/2023 with "Age-appropriate involutional change and microvascular ischemic disease. Right thalamic lacunar infarct which appears old though not well seen on the prior."    Recommendations:  -trend clinical symptoms, exam findings, vital signs, CBC, CMP, INR  -f/u complete infectious workup -- UA 6/19 with large LE  -avoid psychotropic medications  -appreciate recommendations from psych  -Transplant ID --> broad spectrum ABx and foot MRI given recent osteo  -follow-up Urology recommendations  -continue home immunosuppression regimen MMF 500mg BID and prednisone 10mg daily  -if skin grafts completed and without foreseeable issues with wound healing, will consider re-initiation of everolimus in the future after acute illness resolves  -remainder per primary team, including CT head findings noted above    Note incomplete until finalized by attending signature/attestation.    Gallo Rodriguez  GI/Hepatology Fellow    MONDAY-FRIDAY 8AM-5PM:  Pager# [phone] ([facility]) or [phone] ([facility])    NON-URGENT CONSULTS:  Please email [email or web address]  AT NIGHT AND ON WEEKENDS:  Contact on-call GI fellow via answering service ([phone]) from 5pm-8am and on weekends/holidays

## 2023-06-20 NOTE — PROVIDER CONTACT NOTE (CRITICAL VALUE NOTIFICATION) - SITUATION
Critical value notification blood culture from june 11 growth in anaerobic bottle gram positive rods and growth in aerobic bottle Corynebacterium
no new orders
Critical value notification vanco trough 26
Critical lab values  - growth in aerobic bottle  gram positive rods
Not new news pt already being treated and no new orders

## 2023-06-20 NOTE — PROGRESS NOTE ADULT - ATTENDING COMMENTS
68 y/o M with a history notable for JEAN s/p OLT 7/2020 (on MMF + pred), multiple prior infections including MDRO UTIs, bacteremia, CMV viremia presenting with F and R sided back pain found to have L sided ureteral stone, and corynebacterium bacteremia. C/c/b encephalopathy  #Corynebacterium bacteremia  #s/p OLT  #CKD  #CMV  #Back Pain  #Encephalopathy  - bacteremia of unclear source most likely previous TMA site, MRI poor but ?OM, pending repeat if patient tolerates. On Vanc, monitor levls  - blood cultures positive for corynebacterium, repeat BCx NG final and NGTD, TTE no infective endocarditis   - urine cultures growing GNR, s/p tx w Ertapenem, now DC'd on discussion w Txp ID d/t encephalopathy  - mental status now improving (baseline A&Ox3), redirectable, non focal neuro exam, likely 2/2 Ertapenem given improvement after discontinuation       - CTH showing possibly old R thalamic lacunar infarct, not seen on prior CTH from June 2022. Unlikely that this would be etiology of current presentation       - MR brain if tolerates, can pursue as outpatient       - Avoid further psychotropic meds  - Continue MMF + pred, holding everolimus, restart in future as per hepatology  - MRI spine with cervical and lumbar DJD, c/w pain control  - rest of plan as above .

## 2023-06-20 NOTE — PROGRESS NOTE ADULT - ASSESSMENT
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 Pt states this pain began ~2 days ago, .     -CTAP 6 x 3 mm stone seen within the left distal ureter with L hydronephrosis; asymm urinary bladder thickening; L>R perinephric stranding; possible distal esophagitis; trace pelvic fluid with right pelvic surgical clip  -ED POCUS: right hip effusion    Found to have 4/4 bottles with Corynebacterium  Unclear if contaminant or true pathogen but would favor coverage  RLE Foot, scalp skin biopsy site or obstructing nephrolithiasis are possible initial foci  TTE (6/14) moderate AS & mild AR, trace MR, trace CO  Lumbar Spine MRI without findings of infection  R Foot without overt superficial evidence of infection  MRI R Foot was very limited but questioning presence of OM findings.    #Positive BCx, Abnormal MRI Foot (RLE), Abnormal US (POCUS of R Hip)  --Would favor pursuing targeted R Hip MRI (given presence of effusion on ED POCUS US and presenting symptom of R Hip/RLE pain)   --Hold Vancomycin for now given elevated level. Will repeat random level with AM labs  --Ordered for repeat MRI R Foot; follow up on findings (if concerning for OM would consult Podiatry)  --Ertapenem discontinued on 6/19 given otherwise unrevealing cultures for gram negatives and question posed if it could be contributing to his encephalopathy  --Continue to follow CBC with diff  --Continue to follow temperature curve  --Follow up on preliminary blood cultures    #L Hydronephrosis (and obstructing nephrolithiasis)  Appears stable from 2022   UCx (6/11, 6/19) No Growth  Does serve as a possible, albeit unlikely, focus for the Corynebacterium  --Continue to monitor clinical course    #Encephalopathy  Favor medication induced  Improved today  Continue to Monitor    #OLT Recipient  CMV PCR (6/13) Negative  --Continue Valcyte 450 mg PO Q24H    I will continue to follow. Please feel free to contact me with any further questions.    Eusebio Pérez M.D.  [facility] Division of Infectious Disease  8AM-5PM Monday - Friday: Available on [employer] Teams  After Hours and Holidays (or if no response on [employer] Teams): Please contact the Infectious Diseases Office at [phone]

## 2023-06-20 NOTE — PROGRESS NOTE ADULT - PROBLEM SELECTOR PLAN 3
BCx positive for corynebacterium  - c/w vancomycin  - repeat BCx  - TTE - no evidence of endocarditis, aortic valve anatomy not evaluated.

## 2023-06-20 NOTE — PROVIDER CONTACT NOTE (CRITICAL VALUE NOTIFICATION) - TEST AND RESULT REPORTED:
Blood Cultures- growth in aerobic bottle gram positive rods
plt 9
positive blood cultures from 6/11 growth in aeorobic bottle corynebacterium striatum, growth anarobic bottle gram positive rods
6/11/23 + BC growth in aerobic bottle gram + rods coryne bacterium striatum group
Critical value notification
Vanco trough 26

## 2023-06-20 NOTE — PROGRESS NOTE ADULT - SUBJECTIVE AND OBJECTIVE BOX
Follow Up:      Interval History:    REVIEW OF SYSTEMS  [  ] ROS unobtainable because:    [  ] All other systems negative except as noted below    Constitutional:  [ ] fever [ ] chills  [ ] weight loss  [ ] weakness  Skin:  [ ] rash [ ] phlebitis	  Eyes: [ ] icterus [ ] pain  [ ] discharge	  ENMT: [ ] sore throat  [ ] thrush [ ] ulcers [ ] exudates  Respiratory: [ ] dyspnea [ ] hemoptysis [ ] cough [ ] sputum	  Cardiovascular:  [ ] chest pain [ ] palpitations [ ] edema	  Gastrointestinal:  [ ] nausea [ ] vomiting [ ] diarrhea [ ] constipation [ ] pain	  Genitourinary:  [ ] dysuria [ ] frequency [ ] hematuria [ ] discharge [ ] flank pain  [ ] incontinence  Musculoskeletal:  [ ] myalgias [ ] arthralgias [ ] arthritis  [ ] back pain  Neurological:  [ ] headache [ ] seizures  [ ] confusion/altered mental status    Allergies  codeine (Anaphylaxis)        ANTIMICROBIALS:  rifAXIMin 550 two times a day  valGANciclovir 450 daily      OTHER MEDS:  MEDICATIONS  (STANDING):  acetaminophen     Tablet .. 975 every 8 hours  acetaminophen   IVPB .. 1000 once  dextrose 50% Injectable 25 once  dextrose 50% Injectable 12.5 once  dextrose 50% Injectable 25 once  dextrose Oral Gel 15 once  glucagon  Injectable 1 once  heparin   Injectable 5000 every 8 hours  insulin glargine Injectable (LANTUS) 10 at bedtime  insulin lispro (ADMELOG) corrective regimen sliding scale  three times a day before meals  insulin lispro (ADMELOG) corrective regimen sliding scale  at bedtime  lactulose Syrup 10 three times a day  melatonin 3 at bedtime  mycophenolate mofetil 500 two times a day  pantoprazole    Tablet 40 before breakfast  predniSONE   Tablet 10 daily  QUEtiapine 25 at bedtime  tamsulosin 0.4 at bedtime      Vital Signs Last 24 Hrs  T(C): 36.4 (2023 01:40), Max: 37 (2023 20:14)  T(F): 97.6 (2023 01:40), Max: 98.6 (2023 20:14)  HR: 64 (2023 01:40) (64 - 74)  BP: 128/52 (2023 01:40) (114/54 - 128/52)  BP(mean): --  RR: 20 (2023 01:40) (20 - 20)  SpO2: 96% (2023 01:40) (94% - 96%)    Parameters below as of 2023 01:40  Patient On (Oxygen Delivery Method): room air        PHYSICAL EXAMINATION:  General: Alert and Awake, NAD  HEENT: PERRL, EOMI  Neck: Supple  Cardiac: RRR, No M/R/G  Resp: CTAB, No Wh/Rh/Ra  Abdomen: NBS, NT/ND, No HSM, No rigidity or guarding  MSK: No LE edema. No Calf tenderness  : No bob  Skin: No rashes or lesions. Skin is warm and dry to the touch.   Neuro: Alert and Awake. CN 2-12 Grossly intact. Moves all four extremities spontaneously.  Psych: Calm, Pleasant, Cooperative                          10.4   6.26  )-----------( 293      ( 2023 07:29 )             32.9       06-20    139  |  104  |  44<H>  ----------------------------<  108<H>  3.8   |  21<L>  |  2.30<H>    Ca    9.5      2023 07:24  Phos  4.5     06-20  Mg     2.5     -    TPro  7.6  /  Alb  4.0  /  TBili  0.2  /  DBili  x   /  AST  11  /  ALT  16  /  AlkPhos  119  06-20      Urinalysis Basic - ( 2023 13:18 )    Color: Yellow / Appearance: Slightly Turbid / S.017 / pH: x  Gluc: x / Ketone: Negative  / Bili: Negative / Urobili: Negative   Blood: x / Protein: 100 mg/dl / Nitrite: Negative   Leuk Esterase: Large / RBC: 24 /hpf /  /HPF   Sq Epi: x / Non Sq Epi: x / Bacteria: Negative        MICROBIOLOGY:  Vancomycin Level, Random: 23.0 ug/mL (23 @ 07:33)  Vancomycin Level, Trough: 26.0 ug/mL (23 @ 00:26)  v  .Blood Blood-Peripheral  23   No growth to date.  --  --      .Blood Blood-Peripheral  23   No growth to date.  --  --      .Blood Blood-Peripheral  23   No Growth Final  --  --      .Blood Blood  23   No Growth Final  --  --      .Blood Blood  23   Growth in aerobic and anaerobic bottles: Corynebacterium striatum group  Sent out to reference laboratory for susceptibility testing.  See accession # [id number] for report.  --    Growth in aerobic bottle: Gram Positive Rods  Growth in anaerobic bottle: Gram Positive Rods      .Blood Blood  23   Growth in aerobic and anaerobic bottles: Corynebacterium striatum group  "Susceptibilities not performed"  ***Blood Panel PCR results on this specimen are available  approximately 3 hours after the Gram stain result.***  Gram stain, PCR, and/or culture results may not always  correspond due to difference in methodologies.  ************************************************************  This PCR assay was performed by multiplex PCR. This  Assay tests for 66 bacterial and resistance gene targets.  Please refer to the [facility] Labs test directory  at [email or web address] for details.  --  Blood Culture PCR      Clean Catch Clean Catch (Midstream)  23   <10,000 CFU/mL Normal Urogenital Shantel  --  --          Rapid RVP Result: Dina ( @ 11:37)        RADIOLOGY:    <The imaging below has been reviewed and visualized by me independently. Findings as detailed in report below> Follow Up:  encephalopathy, positive BCx    Interval History: more alert today. incontinent of stool today.     REVIEW OF SYSTEMS  [  ] ROS unobtainable because:    [ x ] All other systems negative except as noted below    Constitutional:  [ ] fever [ ] chills  [ ] weight loss  [ ] weakness  Skin:  [ ] rash [ ] phlebitis	  Eyes: [ ] icterus [ ] pain  [ ] discharge	  ENMT: [ ] sore throat  [ ] thrush [ ] ulcers [ ] exudates  Respiratory: [ ] dyspnea [ ] hemoptysis [ ] cough [ ] sputum	  Cardiovascular:  [ ] chest pain [ ] palpitations [ ] edema	  Gastrointestinal:  [ ] nausea [ ] vomiting [ x] diarrhea [ ] constipation [ ] pain	  Genitourinary:  [ ] dysuria [ ] frequency [ ] hematuria [ ] discharge [ ] flank pain  [ ] incontinence  Musculoskeletal:  [ ] myalgias [ ] arthralgias [ ] arthritis  [ ] back pain  Neurological:  [ ] headache [ ] seizures  [ ] confusion/altered mental status    Allergies  codeine (Anaphylaxis)        ANTIMICROBIALS:  rifAXIMin 550 two times a day  valGANciclovir 450 daily      OTHER MEDS:  MEDICATIONS  (STANDING):  acetaminophen     Tablet .. 975 every 8 hours  acetaminophen   IVPB .. 1000 once  dextrose 50% Injectable 25 once  dextrose 50% Injectable 12.5 once  dextrose 50% Injectable 25 once  dextrose Oral Gel 15 once  glucagon  Injectable 1 once  heparin   Injectable 5000 every 8 hours  insulin glargine Injectable (LANTUS) 10 at bedtime  insulin lispro (ADMELOG) corrective regimen sliding scale  three times a day before meals  insulin lispro (ADMELOG) corrective regimen sliding scale  at bedtime  lactulose Syrup 10 three times a day  melatonin 3 at bedtime  mycophenolate mofetil 500 two times a day  pantoprazole    Tablet 40 before breakfast  predniSONE   Tablet 10 daily  QUEtiapine 25 at bedtime  tamsulosin 0.4 at bedtime      Vital Signs Last 24 Hrs  T(C): 36.4 (2023 01:40), Max: 37 (2023 20:14)  T(F): 97.6 (2023 01:40), Max: 98.6 (2023 20:14)  HR: 64 (2023 01:40) (64 - 74)  BP: 128/52 (2023 01:40) (114/54 - 128/52)  BP(mean): --  RR: 20 (2023 01:40) (20 - 20)  SpO2: 96% (2023 01:40) (94% - 96%)    Parameters below as of 2023 01:40  Patient On (Oxygen Delivery Method): room air    PHYSICAL EXAMINATION:  General: Alert and Awake, NAD  HEENT: L sides scalp skin biopsy site with no erythema or drainage  Cardiac: RRR, No M/R/G  Resp: CTAB, No Wh/Rh/Ra  Abdomen: NBS, NT/ND, No HSM, No rigidity or guarding  MSK: No LE edema. No Calf tenderness  : No bob  Skin: No rashes or lesions. Skin is warm and dry to the touch.   Neuro: Alert and Awake. CN 2-12 Grossly intact. Moves all four extremities spontaneously.  Psych: Calm, Pleasant, Cooperative    PHYSICAL EXAMINATION:  General: Alert and Awake, NAD  HEENT: L sides scalp skin biopsy site with no erythema or drainage  Resp: No accessory muscles of respiration utilized  Abdomen: Not distended.  MSK: RLE Foot TMA with no erythema, drainage or obvious open wound.   Skin: As per MSK and HEENT section.    Neuro: Alert and Awake. CN 2-12 Grossly intact. Moves all four extremities spontaneously.  Psych: Calm, Pleasant, Cooperative                          10.4   6.26  )-----------( 293      ( 2023 07:29 )             32.9       06-20    139  |  104  |  44<H>  ----------------------------<  108<H>  3.8   |  21<L>  |  2.30<H>    Ca    9.5      2023 07:24  Phos  4.5     06-20  Mg     2.5     06-20    TPro  7.6  /  Alb  4.0  /  TBili  0.2  /  DBili  x   /  AST  11  /  ALT  16  /  AlkPhos  119  06-20      Urinalysis Basic - ( 2023 13:18 )    Color: Yellow / Appearance: Slightly Turbid / S.017 / pH: x  Gluc: x / Ketone: Negative  / Bili: Negative / Urobili: Negative   Blood: x / Protein: 100 mg/dl / Nitrite: Negative   Leuk Esterase: Large / RBC: 24 /hpf /  /HPF   Sq Epi: x / Non Sq Epi: x / Bacteria: Negative        MICROBIOLOGY:  Vancomycin Level, Random: 23.0 ug/mL (23 @ 07:33)  Vancomycin Level, Trough: 26.0 ug/mL (23 @ 00:26)  v  .Blood Blood-Peripheral  23   No growth to date.  --  --      .Blood Blood-Peripheral  23   No growth to date.  --  --      .Blood Blood-Peripheral  23   No Growth Final  --  --      .Blood Blood  23   No Growth Final  --  --      .Blood Blood  23   Growth in aerobic and anaerobic bottles: Corynebacterium striatum group  Sent out to reference laboratory for susceptibility testing.  See accession # [id number] for report.  --    Growth in aerobic bottle: Gram Positive Rods  Growth in anaerobic bottle: Gram Positive Rods      .Blood Blood  23   Growth in aerobic and anaerobic bottles: Corynebacterium striatum group  "Susceptibilities not performed"  ***Blood Panel PCR results on this specimen are available  approximately 3 hours after the Gram stain result.***  Gram stain, PCR, and/or culture results may not always  correspond due to difference in methodologies.  ************************************************************  This PCR assay was performed by multiplex PCR. This  Assay tests for 66 bacterial and resistance gene targets.  Please refer to the [facility] Labs test directory  at [email or web address] for details.  --  Blood Culture PCR      Clean Catch Clean Catch (Midstream)  23   <10,000 CFU/mL Normal Urogenital Shantel  --  --          Rapid RVP Result: NotDetec ( @ 11:37)        RADIOLOGY:    <The imaging below has been reviewed and visualized by me independently. Findings as detailed in report below>    < from: US Kidney and Bladder (23 @ 18:43) >  IMPRESSION:  Persistent mild-to-moderate left hydronephrosis.  Small right renal cyst.  Debris within the urinary bladder. A left ureteral jet was seen.    < end of copied text >

## 2023-06-21 NOTE — DISCHARGE NOTE PROVIDER - HOSPITAL COURSE
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 CT A&P showing left renal stone 6 x 3 mm with hydroureteronephrosis. Blood cultures drawn and found to have + corynebacterium in 4/4 cultures. Cultures showing sensitivity to vancomycin (Send out). Patient underwent extensive evaluation for etiology of bactermia. MRI C/T/L spine showing - no evidence of OM/discitis, degenerative changes present.  . TTE without evidence of endocarditis/moderate AR/mildAS, normal LVEF. Rt foot MRI showing residual osteomyelitis in TMA. Patient seen by podiatry who determined likely postoperative changes and did not recommend further operative management. Patient with noted rt sided hip effusion on ED POCUS. CT showing evidence of effusion. Effusion sampled by IR without evidence of septic arthritis and NGTD. Repeat blood cultures with NGTD. Patient completed 2 week course of antibiotics with vancomycin, completed in hospital. Hospital course complicated by delirium likely secondary to ertapenem-induced neurotoxicity. 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 Altered mental status/delirium resolved with discontinuation of ertapenem. Course complicated by acute kidney injury likely prerenal iso AMS leading to poor PO intake. To follow-up with PCP for repeat blood work. Of note patient presented after Moh's surgery of scalp. Scalp without evidence of overt infection. Of note patient deconditioned with difficulty walking. Seen by physical therapy who recommend [facility]. Patient and son decided ...     Patient to follow-up with  [ ] Transplant hepatology for management and restarting calcineurin inhibitor  [ ] Dermatology - Dr. Louisa Burgos 6/28 for monitoring of scalp  [ ] Infectious Disease - Dr. 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 CT A&P showing left renal stone 6 x 3 mm with hydroureteronephrosis. Blood cultures drawn and found to have + corynebacterium in 4/4 cultures. Cultures showing sensitivity to vancomycin (Send out). Patient underwent extensive evaluation for etiology of bactermia. MRI C/T/L spine showing - no evidence of OM/discitis, degenerative changes present. TTE without evidence of endocarditis/moderate AR/mildAS, normal LVEF. Rt foot MRI showing residual osteomyelitis in TMA. Patient seen by podiatry who determined likely postoperative changes and did not recommend further operative management. Patient with noted rt sided hip effusion on ED POCUS. CT showing evidence of effusion. Effusion sampled by IR without evidence of septic arthritis and NGTD. Repeat blood cultures with NGTD. Patient completed 2 week course of antibiotics with vancomycin, completed in hospital. Hospital course complicated by delirium likely secondary to ertapenem-induced neurotoxicity. 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 Altered mental status/delirium resolved with discontinuation of ertapenem. Course complicated by acute kidney injury likely prerenal iso AMS leading to poor PO intake. To follow-up with PCP for repeat blood work. Of note patient presented after Moh's surgery of scalp. Scalp without evidence of overt infection and sutures from procedure were removed by dermatology. Of note patient deconditioned with difficulty walking. Seen by physical therapy who recommend [facility]. Patient decided to be discharged to [facility].     Patient to follow-up with  [ ] Transplant hepatology for management and restarting calcineurin inhibitor  [ ] Dermatology - Dr. Louisa Burgos for monitoring of scalp  [ ] Infectious Disease - Dr. 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 CT A&P showing left renal stone 6 x 3 mm with hydroureteronephrosis. Blood cultures drawn and found to have + corynebacterium in 4/4 cultures. Cultures showing sensitivity to vancomycin (Send out). Patient underwent extensive evaluation for etiology of bactermia. MRI C/T/L spine showing - no evidence of OM/discitis, degenerative changes present. TTE without evidence of endocarditis/moderate AR/mildAS, normal LVEF. Rt foot MRI showing residual osteomyelitis in TMA. Patient seen by podiatry who determined likely postoperative changes and did not recommend further operative management. Patient with noted rt sided hip effusion on ED POCUS. CT showing evidence of effusion. Effusion sampled by IR without evidence of septic arthritis and NGTD. Repeat blood cultures with NGTD. Patient completed 2 week course of antibiotics with vancomycin, completed in hospital. Hospital course complicated by delirium likely secondary to ertapenem-induced neurotoxicity. 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 Altered mental status/delirium resolved with discontinuation of ertapenem. Course complicated by acute kidney injury likely prerenal iso AMS leading to poor PO intake. To follow-up with PCP for repeat blood work. Of note patient presented after Moh's surgery of scalp. Scalp without evidence of overt infection and sutures from procedure were removed by dermatology. Of note patient deconditioned with difficulty walking. Seen by physical therapy who recommend [facility]. Patient initially agreeing to go to [facility], but subsequently requesting to go home instead. Patient medically optimized for discharge home with close followup by PCP, Hepatology, Infectious Disease, and Dermatology.    Patient to follow-up with  [ ] Transplant hepatology for management and restarting calcineurin inhibitor  [ ] Dermatology - Dr. Louisa Burgos for monitoring of scalp  [ ] Infectious Disease - Dr. Pérez in 2 weeks for repeat blood cultures  [ ] Urology - Renal stone follow-up

## 2023-06-21 NOTE — DISCHARGE NOTE PROVIDER - CARE PROVIDER_API CALL
Eusebio Pérez  Infectious Disease  [address] [place] [address]  Phone: [phone]  Fax: [phone]  Follow Up Time: 2 weeks    Louisa Burgos  Dermatology  [address] [facility] [facility] [address]  [place] [address]  Phone: [phone]  Fax: [phone]  Follow Up Time: 1 week    Yomi Medina  Transplant Hepatology  [address] [place] [address]  Phone: [phone]  Fax: [phone]  Follow Up Time: 2 weeks    Kinga Carty  Foot and Ankle Surgery  [address] [facility], [place] [address]  Phone: [phone]  Fax: [phone]  Follow Up Time: 2 weeks

## 2023-06-21 NOTE — PROGRESS NOTE ADULT - PROBLEM SELECTOR PLAN 2
Presents with R flank pain radiating to the right side of the abdomen; UA + LE and +WBC  CTAP - 6 x 3 mm stone seen within the left distal ureter with L hydronephrosis; asymm urinary bladder thickening; L>R perinephric stranding;  - No intervention per urology  - medical management for stone  - MRI spine without evidence of OM or discitis.   - Pain management per Chronic pain service.  - MR foot repeat pending, relatively incomplete showing possible OM of 4th and 5th metatarsal/cuneiform. Presents with R flank pain radiating to the right side of the abdomen; UA + LE and +WBC  CTAP - 6 x 3 mm stone seen within the left distal ureter with L hydronephrosis; asymm urinary bladder thickening; L>R perinephric stranding;  - No intervention per urology  - medical management for stone  - MRI spine without evidence of OM or discitis.   - Pain management per Chronic pain service.  - MR foot repeat pending, relatively incomplete showing possible OM of 4th and 5th metatarsal/cuneiform.  - per podiatry no surgical intervention for rt foot

## 2023-06-21 NOTE — DISCHARGE NOTE PROVIDER - NSDCCPCAREPLAN_GEN_ALL_CORE_FT
PRINCIPAL DISCHARGE DIAGNOSIS  Diagnosis: Gram-positive bacteremia  Assessment and Plan of Treatment: You came into the hospital for difficulty walking and pain on your right hip. We did a CT scan of your abdomen that showed a left sided kidney stone and evidence of inflammation of your kidney. We did urine studies that did not grow bacteria in your urine. Your blood however did grow a bacteria called corynebacterium striatum. This bacteria is commonly found on skin however it is not common to be infected with this bacteria. This infection likely occurred due to your immunosuppressive drugs for your liver transplant. We did a work-up to evaluate for the source of the bacterermia. We did an MRI spine that idd not show evidence of infection. An ultrasound of your heart did not show a focus of infection. We did an MRI of your foot that showed possible residual infection, however podiatry did not believe it to be infected, and likely postoperative changes consistent with recent surgery. We did a CT of your hip that showed fluid around the hip and we removed some fluid to test it and culture it. It did not grow any bacteria. We repeated your blood cultures and they effectively cleared   on IV antibiotics.  Please return to the hospital if you experience fever, chills, severe headache, dizziness, lightheadedness, loss of consciousness, visual disturbance, chest pain, palpitations, shortness of breath,  abdominal pain, nausea, vomiting, diarrhea, blood in your vomit/stool/urine, burning with urination or change in urinary pattern, numbness, weakness, tingling, or other alarming symptoms.      SECONDARY DISCHARGE DIAGNOSES  Diagnosis: Toxic metabolic encephalopathy  Assessment and Plan of Treatment: You were found to be delirious and altered in the hospital. You were hallucinating and speaking to yourself. We tested you for many causes such as infection or drug-induced. We did a CT of your head that did not show any new stroke. However it did show you had a stroke in the past. We believe your change in mental status to be from ertapenem, a strong antibiotic that you were on. Please avoid ertapenem in the future as it may cause you to become delirious.    Diagnosis: S/P liver transplant  Assessment and Plan of Treatment: One of your immunosuppresive drugs is being held due to recent surgery with dermatology for removal of skin cancer. Please follow-up with Dr. Medina for restarting this medication.  Please follow-up with Dr. Burgos for postoperative care for your skin cancer surgery on your scalp. She plans to see you 6/28 at her [place] office.    Diagnosis: MISA (acute kidney injury)  Assessment and Plan of Treatment: Your kidney function worsened likely due to dehydration from your altered mental status and kidney stone. We repeated the kidney and bladder ultrasound which did not show worsening obstruction of your kidneys. We gave you IV fluids and your kidney function improved.  Please follow-up with your PCP for monitoring of your kidney function level.  Please follow-up with urology for management of the kidney stone.     PRINCIPAL DISCHARGE DIAGNOSIS  Diagnosis: Gram-positive bacteremia  Assessment and Plan of Treatment: You came into the hospital for difficulty walking and pain on your right hip. We did a CT scan of your abdomen that showed a left sided kidney stone and evidence of inflammation of your kidney. We did urine studies that did not grow bacteria in your urine. Your blood however did grow a bacteria called corynebacterium striatum. This bacteria is commonly found on skin however it is not common to be infected with this bacteria. This infection likely occurred due to your immunosuppressive drugs for your liver transplant. We did a work-up to evaluate for the source of the bacterermia. We did an MRI spine that idd not show evidence of infection. An ultrasound of your heart did not show a focus of infection. We did an MRI of your foot that showed possible residual infection, however podiatry did not believe it to be infected, and likely postoperative changes consistent with recent surgery. We did a CT of your hip that showed fluid around the hip and we removed some fluid to test it and culture it. It did not grow any bacteria. We repeated your blood cultures and they effectively cleared   on IV antibiotics.  .  Please return to the hospital if you experience fever, chills, severe headache, dizziness, lightheadedness, loss of consciousness, visual disturbance, chest pain, palpitations, shortness of breath,  abdominal pain, nausea, vomiting, diarrhea, blood in your vomit/stool/urine, burning with urination or change in urinary pattern, numbness, weakness, tingling, or other alarming symptoms.      SECONDARY DISCHARGE DIAGNOSES  Diagnosis: Toxic metabolic encephalopathy  Assessment and Plan of Treatment: You were found to be delirious and altered in the hospital. You were hallucinating and speaking to yourself. We tested you for many causes such as infection or drug-induced. We did a CT of your head that did not show any new stroke. However it did show you had a stroke in the past. We believe your change in mental status to be from ertapenem, a strong antibiotic that you were on. Please avoid ertapenem in the future as it may cause you to become delirious.    Diagnosis: MISA (acute kidney injury)  Assessment and Plan of Treatment: Your kidney function worsened likely due to dehydration from your altered mental status and kidney stone. We repeated the kidney and bladder ultrasound which did not show worsening obstruction of your kidneys. We gave you IV fluids and your kidney function improved.  .  Please follow-up with your PCP for monitoring of your kidney function level.  Please follow-up with urology for management of the kidney stone.    Diagnosis: S/P liver transplant  Assessment and Plan of Treatment: One of your immunosuppresive drugs is being held due to recent surgery with dermatology for removal of skin cancer. Please follow-up with Dr. Medina for restarting this medication.  .  Please follow-up with Dr. Burgos for postoperative care for your skin cancer surgery on your scalp. She plans to see you 6/28 at her [place] office.

## 2023-06-21 NOTE — PROGRESS NOTE ADULT - ATTENDING COMMENTS
66 y/o M with a history notable for JEAN s/p OLT 7/2020 (on MMF + pred), multiple prior infections including MDRO UTIs, bacteremia, CMV viremia presenting with F and R sided back pain found to have L sided ureteral stone, and corynebacterium bacteremia. C/c/b encephalopathy  #Corynebacterium bacteremia  #s/p OLT  #CKD  #CMV  #Back Pain  #Encephalopathy  - bacteremia of unclear source most likely previous TMA site, MRI poor but ?OM, patient not tolerating repeat MRIs. Concern also exists for possible fluid collection in R hip, because patient does not tolerate MR, will pursue CT hip per ID recs  - blood cultures positive for corynebacterium, repeat BCx NG final and NGTD, TTE no infective endocarditis   - urine cultures growing GNR, s/p tx w Ertapenem, now DC'd on discussion w Txp ID d/t encephalopathy  - mental status now markedly improved (at baseline A&Ox3), conversant, non focal neuro exam, likely 2/2 Ertapenem given improvement after discontinuation       - CTH showing possibly old R thalamic lacunar infarct, not seen on prior CTH from June 2022. Unlikely that this would be etiology of current presentation       - MR brain if tolerates, can pursue as outpatient       - Avoid further psychotropic meds  - Continue MMF + pred, holding everolimus, restart in future as per hepatology  - MRI spine with cervical and lumbar DJD, c/w pain control  - rest of plan as above  - DC planning. Patient refusing rehab, prefers home w Home PT

## 2023-06-21 NOTE — PROGRESS NOTE ADULT - ASSESSMENT
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 Pt states this pain began ~2 days ago, .     -CTAP 6 x 3 mm stone seen within the left distal ureter with L hydronephrosis; asymm urinary bladder thickening; L>R perinephric stranding; possible distal esophagitis; trace pelvic fluid with right pelvic surgical clip  -ED POCUS: right hip effusion    Found to have 4/4 bottles with Corynebacterium  Unclear if contaminant or true pathogen but would favor coverage  RLE Foot, scalp skin biopsy site or obstructing nephrolithiasis are possible initial foci  TTE (6/14) moderate AS & mild AR, trace MR, trace MD  Lumbar Spine MRI without findings of infection  R Foot without overt superficial evidence of infection  MRI R Foot was very limited but questioning presence of OM findings.    #Positive BCx, Abnormal MRI Foot (RLE), Abnormal US (POCUS of R Hip)  --Would favor pursuing targeted imaging of right hip given effusion noted at the joint in the ED POCUS US. Not tolerating MRI due to agitation and repeated exposure to contrast. Would at minimum pursue CT   --Hold Vancomycin for now given elevated level. Will repeat random level with AM labs. Tentatively plan for 14 day course if no deeper seated focus identified (6/12 --> 6/25)  --Ordered for repeat MRI R Foot; discussed with Podiatry low concern for true OM   --Ertapenem discontinued on 6/19 given otherwise unrevealing cultures for gram negatives and question posed if it could be contributing to his encephalopathy  --Continue to follow CBC with diff  --Continue to follow temperature curve  --Follow up on preliminary blood cultures    #L Hydronephrosis (and obstructing nephrolithiasis)  Appears stable from 2022   UCx (6/11, 6/19) No Growth  Does serve as a possible, albeit unlikely, focus for the Corynebacterium  --Continue to monitor clinical course    #Encephalopathy  Favor medication induced  Improved today  Continue to Monitor    #OLT Recipient  CMV PCR (6/13) Negative  --Continue Valcyte 450 mg PO Q24H    I will continue to follow. Please feel free to contact me with any further questions.    Eusebio Pérez M.D.  [facility] Division of Infectious Disease  8AM-5PM Monday - Friday: Available on [employer] Teams  After Hours and Holidays (or if no response on [employer] Teams): Please contact the Infectious Diseases Office at [phone]    The above assessment and plan were discussed with medicine resident

## 2023-06-21 NOTE — DISCHARGE NOTE PROVIDER - NSDCCPTREATMENT_GEN_ALL_CORE_FT
PRINCIPAL PROCEDURE  Procedure: MRI cervical, thoracic and lumbar spine w contrast  Findings and Treatment: 1. CERVICAL SPINE:   Moderate cervical degenerative disc disease appears   unchanged from June 2022. Mild prevertebral infiltration anterior to the   lower cervical spine appears unchanged from June 2022  2. THORACIC SPINE:   Mild thoracic degenerative disc disease  3. LUMBAR SPINE:   Moderate lumbar degenerative disc disease appears   unchanged from June 2022  4. No MR imaging findings strongly suspicious for septic discitis. If   symptoms persist or warrant, supplemental gadolinium-enhanced imaging may     PRINCIPAL PROCEDURE  Procedure: MRI cervical, thoracic and lumbar spine w contrast  Findings and Treatment: 1. CERVICAL SPINE:   Moderate cervical degenerative disc disease appears   unchanged from June 2022. Mild prevertebral infiltration anterior to the   lower cervical spine appears unchanged from June 2022  2. THORACIC SPINE:   Mild thoracic degenerative disc disease  3. LUMBAR SPINE:   Moderate lumbar degenerative disc disease appears   unchanged from June 2022  4. No MR imaging findings strongly suspicious for septic discitis. If   symptoms persist or warrant, supplemental gadolinium-enhanced imaging may      SECONDARY PROCEDURE  Procedure: US kidney complete  Findings and Treatment: ACC: [id number] EXAM:  US KIDNEYS AND BLADDER   ORDERED BY:  JIMBO PADILLA   PROCEDURE DATE:  06/19/2023    IMPRESSION:  Persistent mild-to-moderate left hydronephrosis.  Small right renal cyst.  Debris within the urinary bladder. A left ureteral jet was seen.    Procedure: CT head  Findings and Treatment: ACC: [id number] EXAM:  CT BRAIN   ORDERED BY:  JIMBO PADILLA   PROCEDURE DATE:  06/19/2023    IMPRESSION:  Age-appropriate involutional change and microvascular   ischemic disease. Right thalamic lacunar infarct which appears old though not well seen on the prior.    Procedure: Venous duplex bilateral lower  Findings and Treatment: ACC: [id number] EXAM:  DUPLEX SCAN EXT VEINS LOWER BI   ORDERED BY: TRISHA MONTOYA   PROCEDURE DATE:  06/11/2023    IMPRESSION:  No evidence of deep venous thrombosis in either lowerextremity.    Procedure: CT hip RT wo con  Findings and Treatment: ACC: [id number] EXAM:  CT HIP ONLY RT   ORDERED BY:  JIMBO PADILLA   PROCEDURE DATE:  06/21/2023    IMPRESSION:  Mild to moderate hip arthrosis.  Nonspecific moderate hip joint effusion.  MRI can be performed for more detailed evaluation of clinicallyindicated.  --- End of Report ---

## 2023-06-21 NOTE — PROGRESS NOTE ADULT - ASSESSMENT
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 Found to have + corynebacterium in 2/4 cultures. MRI showing - no evidence of OM/discitis, degenerative changes present.  BCx 6/13 NGTD. TTE without evidence of endocarditis/moderate AR/mildAS, normal LVEF. Incomplete rt foot MRI showing possible OM. Course complicated by delirium 2/2 infection/drug-induced/pain. Delirium not significantly improved.  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 Found to have + corynebacterium in 2/4 cultures. MRI showing - no evidence of OM/discitis, degenerative changes present.  BCx 6/13 NGTD. TTE without evidence of endocarditis/moderate AR/mildAS, normal LVEF. Incomplete rt foot MRI showing possible OM. Course complicated by delirium 2/2 ertapenem neurotoxicity. Significant improvement in mental status.

## 2023-06-21 NOTE — PROGRESS NOTE ADULT - SUBJECTIVE AND OBJECTIVE BOX
Follow Up:      Interval History:    REVIEW OF SYSTEMS  [  ] ROS unobtainable because:    [  ] All other systems negative except as noted below    Constitutional:  [ ] fever [ ] chills  [ ] weight loss  [ ] weakness  Skin:  [ ] rash [ ] phlebitis	  Eyes: [ ] icterus [ ] pain  [ ] discharge	  ENMT: [ ] sore throat  [ ] thrush [ ] ulcers [ ] exudates  Respiratory: [ ] dyspnea [ ] hemoptysis [ ] cough [ ] sputum	  Cardiovascular:  [ ] chest pain [ ] palpitations [ ] edema	  Gastrointestinal:  [ ] nausea [ ] vomiting [ ] diarrhea [ ] constipation [ ] pain	  Genitourinary:  [ ] dysuria [ ] frequency [ ] hematuria [ ] discharge [ ] flank pain  [ ] incontinence  Musculoskeletal:  [ ] myalgias [ ] arthralgias [ ] arthritis  [ ] back pain  Neurological:  [ ] headache [ ] seizures  [ ] confusion/altered mental status    Allergies  codeine (Anaphylaxis)        ANTIMICROBIALS:  rifAXIMin 550 two times a day  valGANciclovir 450 daily      OTHER MEDS:  MEDICATIONS  (STANDING):  acetaminophen     Tablet .. 975 every 8 hours  acetaminophen   IVPB .. 1000 once  dextrose 50% Injectable 25 once  dextrose 50% Injectable 12.5 once  dextrose 50% Injectable 25 once  dextrose Oral Gel 15 once  glucagon  Injectable 1 once  heparin   Injectable 5000 every 8 hours  HYDROmorphone   Solution 1 every 6 hours PRN  HYDROmorphone   Tablet 2 every 6 hours PRN  insulin glargine Injectable (LANTUS) 10 at bedtime  insulin lispro (ADMELOG) corrective regimen sliding scale  at bedtime  insulin lispro (ADMELOG) corrective regimen sliding scale  three times a day before meals  LORazepam     Tablet 0.5 once PRN  melatonin 3 at bedtime  mycophenolate mofetil 500 two times a day  pantoprazole    Tablet 40 before breakfast  predniSONE   Tablet 10 daily  QUEtiapine 25 at bedtime  tamsulosin 0.4 at bedtime      Vital Signs Last 24 Hrs  T(C): 36.8 (21 Jun 2023 14:04), Max: 36.9 (20 Jun 2023 20:43)  T(F): 98.2 (21 Jun 2023 14:04), Max: 98.5 (20 Jun 2023 20:43)  HR: 72 (21 Jun 2023 14:04) (64 - 80)  BP: 115/50 (21 Jun 2023 14:04) (111/48 - 135/52)  BP(mean): --  RR: 18 (21 Jun 2023 14:04) (18 - 18)  SpO2: 99% (21 Jun 2023 14:04) (92% - 99%)    Parameters below as of 21 Jun 2023 14:04  Patient On (Oxygen Delivery Method): room air        PHYSICAL EXAMINATION:  General: Alert and Awake, NAD  HEENT: PERRL, EOMI  Neck: Supple  Cardiac: RRR, No M/R/G  Resp: CTAB, No Wh/Rh/Ra  Abdomen: NBS, NT/ND, No HSM, No rigidity or guarding  MSK: No LE edema. No Calf tenderness  : No bob  Skin: No rashes or lesions. Skin is warm and dry to the touch.   Neuro: Alert and Awake. CN 2-12 Grossly intact. Moves all four extremities spontaneously.  Psych: Calm, Pleasant, Cooperative                          9.5    7.59  )-----------( 320      ( 21 Jun 2023 07:49 )             30.5       06-21    141  |  103  |  52<H>  ----------------------------<  104<H>  3.8   |  21<L>  |  2.75<H>    Ca    9.4      21 Jun 2023 07:50  Phos  3.9     06-21  Mg     2.4     06-21    TPro  7.4  /  Alb  3.9  /  TBili  0.2  /  DBili  x   /  AST  11  /  ALT  12  /  AlkPhos  115  06-21      Urinalysis Basic - ( 21 Jun 2023 07:50 )    Color: x / Appearance: x / SG: x / pH: x  Gluc: 104 mg/dL / Ketone: x  / Bili: x / Urobili: x   Blood: x / Protein: x / Nitrite: x   Leuk Esterase: x / RBC: x / WBC x   Sq Epi: x / Non Sq Epi: x / Bacteria: x        MICROBIOLOGY:  Vancomycin Level, Random: 21.7 ug/mL (06-21-23 @ 07:49)  v  Clean Catch Clean Catch (Midstream)  06-19-23   <10,000 CFU/mL Normal Urogenital Shantel  --  --      .Blood Blood-Peripheral  06-18-23   No growth to date.  --  --      .Blood Blood-Peripheral  06-17-23   No growth to date.  --  --      .Blood Blood-Peripheral  06-14-23   No Growth Final  --  --      .Blood Blood  06-13-23   No Growth Final  --  --      .Blood Blood  06-11-23   Growth in aerobic and anaerobic bottles: Corynebacterium striatum group  Sent out to reference laboratory for susceptibility testing.  See accession # [id number] for report.  --    Growth in aerobic bottle: Gram Positive Rods  Growth in anaerobic bottle: Gram Positive Rods      .Blood Blood  06-11-23   Growth in aerobic and anaerobic bottles: Corynebacterium striatum group  "Susceptibilities not performed"  ***Blood Panel PCR results on this specimen are available  approximately 3 hours after the Gram stain result.***  Gram stain, PCR, and/or culture results may not always  correspond due to difference in methodologies.  ************************************************************  This PCR assay was performed by multiplex PCR. This  Assay tests for 66 bacterial and resistance gene targets.  Please refer to the [facility] test directory  at [email or web address] for details.  --  Blood Culture PCR      Clean Catch Clean Catch (Midstream)  06-11-23   <10,000 CFU/mL Normal Urogenital Shantel  --  --          Rapid RVP Result: Dina (06-19 @ 11:37)        RADIOLOGY:    <The imaging below has been reviewed and visualized by me independently. Findings as detailed in report below> Follow Up:  positive BCx    Interval History: afebrile. no acute events. remains more alert today.     REVIEW OF SYSTEMS  [  ] ROS unobtainable because:    [ x ] All other systems negative except as noted below    Constitutional:  [ ] fever [ ] chills  [ ] weight loss  [ ] weakness  Skin:  [ ] rash [ ] phlebitis	  Eyes: [ ] icterus [ ] pain  [ ] discharge	  ENMT: [ ] sore throat  [ ] thrush [ ] ulcers [ ] exudates  Respiratory: [ ] dyspnea [ ] hemoptysis [ ] cough [ ] sputum	  Cardiovascular:  [ ] chest pain [ ] palpitations [ ] edema	  Gastrointestinal:  [ ] nausea [ ] vomiting [ x] diarrhea [ ] constipation [ ] pain	  Genitourinary:  [ ] dysuria [ ] frequency [ ] hematuria [ ] discharge [ ] flank pain  [ ] incontinence  Musculoskeletal:  [ ] myalgias [ ] arthralgias [ ] arthritis  [ ] back pain  Neurological:  [ ] headache [ ] seizures  [ ] confusion/altered mental status      Allergies  codeine (Anaphylaxis)        ANTIMICROBIALS:  rifAXIMin 550 two times a day  valGANciclovir 450 daily      OTHER MEDS:  MEDICATIONS  (STANDING):  acetaminophen     Tablet .. 975 every 8 hours  acetaminophen   IVPB .. 1000 once  dextrose 50% Injectable 25 once  dextrose 50% Injectable 12.5 once  dextrose 50% Injectable 25 once  dextrose Oral Gel 15 once  glucagon  Injectable 1 once  heparin   Injectable 5000 every 8 hours  HYDROmorphone   Solution 1 every 6 hours PRN  HYDROmorphone   Tablet 2 every 6 hours PRN  insulin glargine Injectable (LANTUS) 10 at bedtime  insulin lispro (ADMELOG) corrective regimen sliding scale  at bedtime  insulin lispro (ADMELOG) corrective regimen sliding scale  three times a day before meals  LORazepam     Tablet 0.5 once PRN  melatonin 3 at bedtime  mycophenolate mofetil 500 two times a day  pantoprazole    Tablet 40 before breakfast  predniSONE   Tablet 10 daily  QUEtiapine 25 at bedtime  tamsulosin 0.4 at bedtime      Vital Signs Last 24 Hrs  T(C): 36.8 (21 Jun 2023 14:04), Max: 36.9 (20 Jun 2023 20:43)  T(F): 98.2 (21 Jun 2023 14:04), Max: 98.5 (20 Jun 2023 20:43)  HR: 72 (21 Jun 2023 14:04) (64 - 80)  BP: 115/50 (21 Jun 2023 14:04) (111/48 - 135/52)  BP(mean): --  RR: 18 (21 Jun 2023 14:04) (18 - 18)  SpO2: 99% (21 Jun 2023 14:04) (92% - 99%)    Parameters below as of 21 Jun 2023 14:04  Patient On (Oxygen Delivery Method): room air    PHYSICAL EXAMINATION:  General: Alert and Awake, NAD  HEENT: L sides scalp skin biopsy site with no erythema or drainage  Resp: No accessory muscles of respiration utilized  Abdomen: Not distended.  MSK: RLE Foot TMA with no erythema, drainage or obvious open wound.   Skin: As per MSK and HEENT section.    Neuro: Alert and Awake. CN 2-12 Grossly intact. Moves all four extremities spontaneously.  Psych: Calm, Pleasant, Cooperative                          9.5    7.59  )-----------( 320      ( 21 Jun 2023 07:49 )             30.5       06-21    141  |  103  |  52<H>  ----------------------------<  104<H>  3.8   |  21<L>  |  2.75<H>    Ca    9.4      21 Jun 2023 07:50  Phos  3.9     06-21  Mg     2.4     06-21    TPro  7.4  /  Alb  3.9  /  TBili  0.2  /  DBili  x   /  AST  11  /  ALT  12  /  AlkPhos  115  06-21      Urinalysis Basic - ( 21 Jun 2023 07:50 )    Color: x / Appearance: x / SG: x / pH: x  Gluc: 104 mg/dL / Ketone: x  / Bili: x / Urobili: x   Blood: x / Protein: x / Nitrite: x   Leuk Esterase: x / RBC: x / WBC x   Sq Epi: x / Non Sq Epi: x / Bacteria: x        MICROBIOLOGY:  Vancomycin Level, Random: 21.7 ug/mL (06-21-23 @ 07:49)  v  Clean Catch Clean Catch (Midstream)  06-19-23   <10,000 CFU/mL Normal Urogenital Shantel  --  --      .Blood Blood-Peripheral  06-18-23   No growth to date.  --  --      .Blood Blood-Peripheral  06-17-23   No growth to date.  --  --      .Blood Blood-Peripheral  06-14-23   No Growth Final  --  --      .Blood Blood  06-13-23   No Growth Final  --  --      .Blood Blood  06-11-23   Growth in aerobic and anaerobic bottles: Corynebacterium striatum group  Sent out to reference laboratory for susceptibility testing.  See accession # [id number] for report.  --    Growth in aerobic bottle: Gram Positive Rods  Growth in anaerobic bottle: Gram Positive Rods      .Blood Blood  06-11-23   Growth in aerobic and anaerobic bottles: Corynebacterium striatum group  "Susceptibilities not performed"  ***Blood Panel PCR results on this specimen are available  approximately 3 hours after the Gram stain result.***  Gram stain, PCR, and/or culture results may not always  correspond due to difference in methodologies.  ************************************************************  This PCR assay was performed by multiplex PCR. This  Assay tests for 66 bacterial and resistance gene targets.  Please refer to the [facility] Labs test directory  at [email or web address] for details.  --  Blood Culture PCR      Clean Catch Clean Catch (Midstream)  06-11-23   <10,000 CFU/mL Normal Urogenital Shantel  --  --          Rapid RVP Result: NotDetec (06-19 @ 11:37)        RADIOLOGY:    <The imaging below has been reviewed and visualized by me independently. Findings as detailed in report below>    < from: US Kidney and Bladder (06.19.23 @ 18:43) >  IMPRESSION:  Persistent mild-to-moderate left hydronephrosis.  Small right renal cyst.  Debris within the urinary bladder. A left ureteral jet was seen.    < end of copied text >

## 2023-06-21 NOTE — DISCHARGE NOTE PROVIDER - NSDCFUSCHEDAPPT_GEN_ALL_CORE_FT
Yomi Medina  [facility]  HEPATOLOGY [address] [place] [address]   Scheduled Appointment: 07/06/2023

## 2023-06-21 NOTE — PROGRESS NOTE ADULT - PROBLEM SELECTOR PLAN 5
dehydration due to AMS vs Vancomycin-induced vs AIN  - FeNA 0.6%  - Creatinine now 2.3  - Urine Studies.   - Kidney & Bladder US  - gentle hydration - albumin 100cc x 3 dehydration due to AMS vs Vancomycin-induced vs AIN  - FeNA 0.6%  - Creatinine uptrending to 2.75  - IVF  - Kidney & Bladder US negative for hydronephrosis  - good PO intake

## 2023-06-21 NOTE — CONSULT NOTE ADULT - ASSESSMENT
67 M s/p 1/25 R foot TMA w/ LEAH  - Pt seen and evaluated  - Right foot TMA healed, no open wounds, no erythema or fluctuance, left foot no open wounds or clinical signs of infection  - 1/25 clean bone margin pathology negative for osteomyelitis, 1/25 clean bone culture no growth  - R foot MRI limited but there is concern for possible 5th metatarsal base, 4th met base, lat cun OM, no abscess  - No clinical signs of infection bilaterally, unclear if right foot is source given no probe to bone, healed surgical site, and negative clean bone margins from intervention  - podiatry plan currently local wound care  - Discussed w/ attending  67 M s/p 1/25 R foot TMA w/ LEAH  - Pt seen and evaluated  - Afebrile, no leukocytosis   - Right foot TMA healed, no open wounds, no erythema or fluctuance, left foot no open wounds or clinical signs of infection  - 1/25 clean bone margin pathology negative for osteomyelitis, 1/25 clean bone culture no growth  - R foot MRI limited but there is concern for possible 5th metatarsal base, 4th met base, lat cun OM, no abscess  - No clinical signs of infection bilaterally, unclear if right foot is source given no probe to bone, healed surgical site, and negative clean bone margins from intervention  - podiatry plan currently local wound care  - seen w/ attending  67 M s/p 1/25 R foot TMA w/ LEAH  - Pt seen and evaluated  - Afebrile, no leukocytosis   - Right foot TMA healed, no open wounds, no erythema or fluctuance, left foot no open wounds or clinical signs of infection  - 1/25 clean bone margin pathology negative for osteomyelitis, 1/25 clean bone culture no growth  - R foot MRI limited but there is concern for possible 5th metatarsal base, 4th met base, lat cun OM, no abscess  - No clinical signs of infection bilaterally, unclear if right foot is source given no probe to bone, healed surgical site, and negative clean bone margins from intervention  - Case discussed with ID and radiology   - podiatry plan currently local wound care  - seen w/ attending

## 2023-06-21 NOTE — CONSULT NOTE ADULT - SUBJECTIVE AND OBJECTIVE BOX
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 Pt states this pain began ~2 days ago, Denies recent trauma, travel, infections, or sick contacts. He denies N/F/V/C, CP, SOB, diarrhea, constipation, melena/ hematochezia, dysuria, and hematuria. State the R back/ flank pain moves around to the right side of the abdomen. The hip pain does not move to groin or down the extremity. States both these pains worsen with movement. Pt also reports L neck pain moving to shoulder; states it has been difficult to rotate head. Reports fatigue few weeks prior to onset of symptoms. Due to pain, he has addison unable to get out of bed. States pain mildly improves with Tylenol. Pt has not had any recent medication changes.         PAST MEDICAL & SURGICAL HISTORY:  Diabetes      Hypercholesteremia      Neuropathy      Hypertension      Renal stones  25 years ago      Fatty liver disease, nonalcoholic      Obesity      Hepatic encephalopathy      GERD with esophagitis  Gastritis & Non Bleeding Ulcers      GIB (gastrointestinal bleeding)      S/P cholecystectomy          MEDICATIONS  (STANDING):  acetaminophen     Tablet .. 975 milliGRAM(s) Oral every 8 hours  acetaminophen   IVPB .. 1000 milliGRAM(s) IV Intermittent once  chlorhexidine 4% Liquid 1 Application(s) Topical daily  dextrose 5%. 1000 milliLiter(s) (100 mL/Hr) IV Continuous <Continuous>  dextrose 5%. 1000 milliLiter(s) (50 mL/Hr) IV Continuous <Continuous>  dextrose 50% Injectable 25 Gram(s) IV Push once  dextrose 50% Injectable 25 Gram(s) IV Push once  dextrose 50% Injectable 12.5 Gram(s) IV Push once  dextrose Oral Gel 15 Gram(s) Oral once  folic acid 1 milliGRAM(s) Oral daily  glucagon  Injectable 1 milliGRAM(s) IntraMuscular once  heparin   Injectable 5000 Unit(s) SubCutaneous every 8 hours  insulin glargine Injectable (LANTUS) 10 Unit(s) SubCutaneous at bedtime  insulin lispro (ADMELOG) corrective regimen sliding scale   SubCutaneous three times a day before meals  insulin lispro (ADMELOG) corrective regimen sliding scale   SubCutaneous at bedtime  lactated ringers Bolus 500 milliLiter(s) IV Bolus once  lactulose Syrup 10 Gram(s) Oral three times a day  lidocaine   4% Patch 3 Patch Transdermal every 24 hours  melatonin 3 milliGRAM(s) Oral at bedtime  mycophenolate mofetil 500 milliGRAM(s) Oral two times a day  pantoprazole    Tablet 40 milliGRAM(s) Oral before breakfast  predniSONE   Tablet 10 milliGRAM(s) Oral daily  QUEtiapine 25 milliGRAM(s) Oral at bedtime  rifAXIMin 550 milliGRAM(s) Oral two times a day  tamsulosin 0.4 milliGRAM(s) Oral at bedtime  valGANciclovir 450 milliGRAM(s) Oral daily    MEDICATIONS  (PRN):  HYDROmorphone   Solution 1 milliGRAM(s) Oral every 6 hours PRN Moderate Pain (4 - 6)  HYDROmorphone   Tablet 2 milliGRAM(s) Oral every 6 hours PRN Severe Pain (7 - 10)  LORazepam     Tablet 0.5 milliGRAM(s) Oral once PRN Anxiety      Allergies    codeine (Anaphylaxis)    Intolerances        VITALS:    Vital Signs Last 24 Hrs  T(C): 36.7 (21 Jun 2023 07:01), Max: 36.9 (20 Jun 2023 20:43)  T(F): 98.1 (21 Jun 2023 07:01), Max: 98.5 (20 Jun 2023 20:43)  HR: 64 (21 Jun 2023 07:01) (64 - 80)  BP: 112/52 (21 Jun 2023 07:01) (112/52 - 135/52)  BP(mean): --  RR: 18 (21 Jun 2023 07:01) (18 - 20)  SpO2: 95% (21 Jun 2023 07:01) (95% - 98%)    Parameters below as of 21 Jun 2023 07:01  Patient On (Oxygen Delivery Method): room air        LABS:                          9.5    7.59  )-----------( 320      ( 21 Jun 2023 07:49 )             30.5       06-21    141  |  103  |  52<H>  ----------------------------<  104<H>  3.8   |  21<L>  |  2.75<H>    Ca    9.4      21 Jun 2023 07:50  Phos  3.9     06-21  Mg     2.4     06-21    TPro  7.4  /  Alb  3.9  /  TBili  0.2  /  DBili  x   /  AST  11  /  ALT  12  /  AlkPhos  115  06-21      CAPILLARY BLOOD GLUCOSE      POCT Blood Glucose.: 111 mg/dL (21 Jun 2023 09:06)  POCT Blood Glucose.: 168 mg/dL (20 Jun 2023 22:21)  POCT Blood Glucose.: 118 mg/dL (20 Jun 2023 17:09)  POCT Blood Glucose.: 129 mg/dL (20 Jun 2023 12:21)          LOWER EXTREMITY PHYSICAL EXAM:    Vascular: DP/PT 1/4, B/L, CFT inact, Temperature gradient warm to cool, B/L.   Neuro: Epicritic sensation diminished to the level of toes, B/L.  Musculoskeletal/Ortho: s/p 1/25 right foot TMA w/ LEAH  Skin: sutures intact, graft intact , no hematoma, no seroma,  minimal drainage, no acute SOI    RADIOLOGY & ADDITIONAL STUDIES:

## 2023-06-21 NOTE — DISCHARGE NOTE PROVIDER - NSDCFUADDAPPT_GEN_ALL_CORE_FT
Podiatry Discharge Instructions:  Follow up: Please follow up with Dr. Carty within 1 week of discharge from the hospital, please call [phone] for appointment and discuss that you recently were seen in the hospital.  Weight bearing: Please weight bear as tolerated in a surgical shoe.  Antibiotics: Please continue as instructed.

## 2023-06-21 NOTE — DISCHARGE NOTE PROVIDER - PROVIDER TOKENS
doxepin 10 MG Oral Cap- placed in nurses bin. PROVIDER:[TOKEN:[[id number]],FOLLOWUP:[2 weeks]],PROVIDER:[TOKEN:[[id number]],FOLLOWUP:[1 week]],PROVIDER:[TOKEN:[[id number]],FOLLOWUP:[2 weeks]],PROVIDER:[TOKEN:[[id number]],FOLLOWUP:[2 weeks]]

## 2023-06-21 NOTE — PROGRESS NOTE ADULT - PROBLEM SELECTOR PLAN 1
Pt with increasing confusion, non-focal exam.  Likely due to lyrica iso CKD vs robaxin vs hospital-acquired, infection - bcx cleared, concern for ertapenem contribution.   - CTH without evidence of acute pathology  - d/c ertapenem  - mental status similar less agitation.   - Transplant ID following  - Psych consulted; Seroquel HS ordered; ECG performed QTc <500  - on rifaximin/lactulose for possible but low likelihood HE  - Significant improvement - likely ertapenem induced Pt with increasing confusion, non-focal exam. Likely ertapenem induced  Likely due to lyrica iso CKD vs robaxin vs hospital-acquired, infection - bcx cleared, concern for ertapenem contribution.   - CTH without evidence of acute pathology  - d/c ertapenem  - Transplant ID following  - Psych consulted; Seroquel HS ordered; ECG performed QTc <500  - on rifaximin for possible but low likelihood HE

## 2023-06-21 NOTE — PROGRESS NOTE ADULT - PROBLEM SELECTOR PLAN 3
BCx positive for corynebacterium  - c/w vancomycin  - repeat BCx  - TTE - no evidence of endocarditis, aortic valve anatomy not evaluated. BCx positive for corynebacterium  etiology skin surgery on scalp vs rt foot OM vs left ureteral stone  - c/w vancomycin  - repeat BCx  - TTE - no evidence of endocarditis, aortic valve anatomy not evaluated.

## 2023-06-21 NOTE — DISCHARGE NOTE PROVIDER - NSDCMRMEDTOKEN_GEN_ALL_CORE_FT
CellCept 500 mg oral tablet: 1 tab(s) orally 2 times a day  Flomax 0.4 mg oral capsule: 1 tab(s) orally once a day (at bedtime)  folic acid 1 mg oral tablet: 1 tab(s) orally once a day  Lantus 100 units/mL subcutaneous solution: 14 unit(s) subcutaneous once a day (at bedtime)  pantoprazole 40 mg oral delayed release tablet: 1 tab(s) orally once a day (before a meal)  Please dispense ONE R lower extremity prefabricated AFO walker: Please dispense ONE R lower extremity prefabricated AFO walker  Ind: s/p R foot TMA with LEAH  Disp: 1 prefabricated AFO walker   predniSONE 10 mg oral tablet: 1 tab(s) orally once a day  psyllium 3.4 g/7 g oral powder for reconstitution: 1 packet(s) orally once a day  senna leaf extract oral tablet: 2 tab(s) orally once a day (at bedtime), As needed, Constipation  sertraline 100 mg oral tablet: 1 tab(s) orally once a day  Valcyte 450 mg oral tablet: 1 tab(s) orally once a day (in the morning)  Wound Care Orders: Please apply wound vac with black foam to the R lateral foot graft @75mmHg 3x a week, thank you!    If Wound Vac Fails, please apply Wet to Dry   Bedside Commode: Per Patient Insurance  CellCept 500 mg oral tablet: 1 tab(s) orally 2 times a day  Flomax 0.4 mg oral capsule: 1 tab(s) orally once a day (at bedtime)  folic acid 1 mg oral tablet: 1 tab(s) orally once a day  Lantus 100 units/mL subcutaneous solution: 14 unit(s) subcutaneous once a day (at bedtime)  Lidozo 4% topical film: Apply topically to affected area once a day  pantoprazole 40 mg oral delayed release tablet: 1 tab(s) orally once a day (before a meal)  predniSONE 10 mg oral tablet: 1 tab(s) orally once a day  psyllium 3.4 g/7 g oral powder for reconstitution: 1 packet(s) orally once a day  Rolling Walker: Per Patient Insurance  senna leaf extract oral tablet: 2 tab(s) orally once a day (at bedtime), As needed, Constipation  sertraline 100 mg oral tablet: 1 tab(s) orally once a day  Valcyte 450 mg oral tablet: 1 tab(s) orally once a day (in the morning)   Bedside Commode: Per Patient Insurance  Bedside Commode: Per Patient Insurance  CellCept 500 mg oral tablet: 1 tab(s) orally 2 times a day  Flomax 0.4 mg oral capsule: 1 tab(s) orally once a day (at bedtime)  folic acid 1 mg oral tablet: 1 tab(s) orally once a day  Lantus 100 units/mL subcutaneous solution: 14 unit(s) subcutaneous once a day (at bedtime)  Lidozo 4% topical film: Apply topically to affected area once a day  pantoprazole 40 mg oral delayed release tablet: 1 tab(s) orally once a day (before a meal)  predniSONE 10 mg oral tablet: 1 tab(s) orally once a day  psyllium 3.4 g/7 g oral powder for reconstitution: 1 packet(s) orally once a day  Rolling Walker: Per Patient Insurance  senna leaf extract oral tablet: 2 tab(s) orally once a day (at bedtime), As needed, Constipation  sertraline 100 mg oral tablet: 1 tab(s) orally once a day  Valcyte 450 mg oral tablet: 1 tab(s) orally once a day (in the morning)   Admelog 100 units/mL injectable solution: 8 unit(s) injectable 3 times a day (before meals)  Bedside Commode: Per Patient Insurance  CellCept 500 mg oral tablet: 1 tab(s) orally 2 times a day  Flomax 0.4 mg oral capsule: 1 tab(s) orally once a day (at bedtime)  folic acid 1 mg oral tablet: 1 tab(s) orally once a day  Lantus 100 units/mL subcutaneous solution: 14 unit(s) subcutaneous once a day (at bedtime)  Lidozo 4% topical film: Apply topically to affected area once a day  pantoprazole 40 mg oral delayed release tablet: 1 tab(s) orally once a day (before a meal)  predniSONE 10 mg oral tablet: 1 tab(s) orally once a day  psyllium 3.4 g/7 g oral powder for reconstitution: 1 packet(s) orally once a day  Rolling Walker: Per Patient Insurance  senna leaf extract oral tablet: 2 tab(s) orally once a day (at bedtime), As needed, Constipation  sertraline 100 mg oral tablet: 1 tab(s) orally once a day  Valcyte 450 mg oral tablet: 1 tab(s) orally once a day (in the morning)

## 2023-06-21 NOTE — PROGRESS NOTE ADULT - SUBJECTIVE AND OBJECTIVE BOX
PROGRESS NOTE:   Authored by Morgan Fairchild MD   Patient is a 67y old  Male who presents with a chief complaint of back pain (2023 10:18)      SUBJECTIVE / OVERNIGHT EVENTS:  No acute events overnight.     ADDITIONAL REVIEW OF SYSTEMS:    MEDICATIONS  (STANDING):  acetaminophen     Tablet .. 975 milliGRAM(s) Oral every 8 hours  acetaminophen   IVPB .. 1000 milliGRAM(s) IV Intermittent once  chlorhexidine 4% Liquid 1 Application(s) Topical daily  dextrose 5%. 1000 milliLiter(s) (50 mL/Hr) IV Continuous <Continuous>  dextrose 5%. 1000 milliLiter(s) (100 mL/Hr) IV Continuous <Continuous>  dextrose 50% Injectable 25 Gram(s) IV Push once  dextrose 50% Injectable 12.5 Gram(s) IV Push once  dextrose 50% Injectable 25 Gram(s) IV Push once  dextrose Oral Gel 15 Gram(s) Oral once  folic acid 1 milliGRAM(s) Oral daily  glucagon  Injectable 1 milliGRAM(s) IntraMuscular once  heparin   Injectable 5000 Unit(s) SubCutaneous every 8 hours  insulin glargine Injectable (LANTUS) 10 Unit(s) SubCutaneous at bedtime  insulin lispro (ADMELOG) corrective regimen sliding scale   SubCutaneous at bedtime  insulin lispro (ADMELOG) corrective regimen sliding scale   SubCutaneous three times a day before meals  lactulose Syrup 10 Gram(s) Oral three times a day  lidocaine   4% Patch 3 Patch Transdermal every 24 hours  melatonin 3 milliGRAM(s) Oral at bedtime  mycophenolate mofetil 500 milliGRAM(s) Oral two times a day  pantoprazole    Tablet 40 milliGRAM(s) Oral before breakfast  predniSONE   Tablet 10 milliGRAM(s) Oral daily  QUEtiapine 25 milliGRAM(s) Oral at bedtime  rifAXIMin 550 milliGRAM(s) Oral two times a day  tamsulosin 0.4 milliGRAM(s) Oral at bedtime  valGANciclovir 450 milliGRAM(s) Oral daily    MEDICATIONS  (PRN):  HYDROmorphone   Solution 1 milliGRAM(s) Oral every 6 hours PRN Moderate Pain (4 - 6)  HYDROmorphone   Tablet 2 milliGRAM(s) Oral every 6 hours PRN Severe Pain (7 - 10)  LORazepam     Tablet 0.5 milliGRAM(s) Oral once PRN Anxiety      CAPILLARY BLOOD GLUCOSE      POCT Blood Glucose.: 168 mg/dL (2023 22:21)  POCT Blood Glucose.: 118 mg/dL (2023 17:09)  POCT Blood Glucose.: 129 mg/dL (2023 12:21)  POCT Blood Glucose.: 107 mg/dL (2023 08:10)    I&O's Summary    2023 07:01  -  2023 07:00  --------------------------------------------------------  IN: 240 mL / OUT: 800 mL / NET: -560 mL        PHYSICAL EXAM:  Vital Signs Last 24 Hrs  T(C): 36.7 (2023 07:01), Max: 36.9 (2023 20:43)  T(F): 98.1 (:01), Max: 98.5 (2023 20:43)  HR: 64 (2023 07:01) (64 - 80)  BP: 112/52 (2023 07:01) (112/52 - 135/52)  BP(mean): --  RR: 18 (2023 07:01) (18 - 20)  SpO2: 95% (2023 07:01) (95% - 98%)    Parameters below as of 2023 07:01  Patient On (Oxygen Delivery Method): room air        GENERAL: No apparent distress.   HEAD:  Atraumatic, Normocephalic  EYES: EOMI. Pupils 2mm b/l and reactive to light. left eye coloboma.   CHEST/LUNG: Clear to auscultation bilateral and symmetric; No wheezes, rales, or rhonchi  HEART: S1 and S2 normal. Regular rate and rhythm; No murmurs, rubs, or gallops  ABDOMEN: Soft, non-tender, globose  EXTREMITIES:  2+ peripheral pulses b/l, No clubbing, cyanosis, or edema  NEUROLOGY: A&O x 3, follows simple instructions. Maintaining attention. UE 5/5, LE 4/5 equal.    LABS:                        10.4   6.26  )-----------( 293      ( 2023 07:29 )             32.9     06-20    139  |  104  |  44<H>  ----------------------------<  108<H>  3.8   |  21<L>  |  2.30<H>    Ca    9.5      2023 07:24  Phos  4.5     06-20  Mg     2.5     06-20    TPro  7.6  /  Alb  4.0  /  TBili  0.2  /  DBili  x   /  AST  11  /  ALT  16  /  AlkPhos  119  06-20          Urinalysis Basic - ( 2023 13:18 )    Color: Yellow / Appearance: Slightly Turbid / S.017 / pH: x  Gluc: x / Ketone: Negative  / Bili: Negative / Urobili: Negative   Blood: x / Protein: 100 mg/dl / Nitrite: Negative   Leuk Esterase: Large / RBC: 24 /hpf /  /HPF   Sq Epi: x / Non Sq Epi: x / Bacteria: Negative        Culture - Urine (collected 2023 13:19)  Source: Clean Catch Clean Catch (Midstream)  Final Report (2023 14:39):    <10,000 CFU/mL Normal Urogenital Shantel    Culture - Blood (collected 2023 15:40)  Source: .Blood Blood-Peripheral  Preliminary Report (2023 20:02):    No growth to date.        RADIOLOGY & ADDITIONAL TESTS:  Lab Results Reviewed   Imaging Reviewed  Electrocardiogram Reviewed   PROGRESS NOTE:   Authored by Morgan Fairchild MD   Patient is a 67y old  Male who presents with a chief complaint of back pain (2023 10:18)      SUBJECTIVE / OVERNIGHT EVENTS:  No acute events overnight.     Patient answering questions appropriately, understands plan of care.    Plan discussed with son Nahid.   ADDITIONAL REVIEW OF SYSTEMS:    MEDICATIONS  (STANDING):  acetaminophen     Tablet .. 975 milliGRAM(s) Oral every 8 hours  acetaminophen   IVPB .. 1000 milliGRAM(s) IV Intermittent once  chlorhexidine 4% Liquid 1 Application(s) Topical daily  dextrose 5%. 1000 milliLiter(s) (50 mL/Hr) IV Continuous <Continuous>  dextrose 5%. 1000 milliLiter(s) (100 mL/Hr) IV Continuous <Continuous>  dextrose 50% Injectable 25 Gram(s) IV Push once  dextrose 50% Injectable 12.5 Gram(s) IV Push once  dextrose 50% Injectable 25 Gram(s) IV Push once  dextrose Oral Gel 15 Gram(s) Oral once  folic acid 1 milliGRAM(s) Oral daily  glucagon  Injectable 1 milliGRAM(s) IntraMuscular once  heparin   Injectable 5000 Unit(s) SubCutaneous every 8 hours  insulin glargine Injectable (LANTUS) 10 Unit(s) SubCutaneous at bedtime  insulin lispro (ADMELOG) corrective regimen sliding scale   SubCutaneous at bedtime  insulin lispro (ADMELOG) corrective regimen sliding scale   SubCutaneous three times a day before meals  lactulose Syrup 10 Gram(s) Oral three times a day  lidocaine   4% Patch 3 Patch Transdermal every 24 hours  melatonin 3 milliGRAM(s) Oral at bedtime  mycophenolate mofetil 500 milliGRAM(s) Oral two times a day  pantoprazole    Tablet 40 milliGRAM(s) Oral before breakfast  predniSONE   Tablet 10 milliGRAM(s) Oral daily  QUEtiapine 25 milliGRAM(s) Oral at bedtime  rifAXIMin 550 milliGRAM(s) Oral two times a day  tamsulosin 0.4 milliGRAM(s) Oral at bedtime  valGANciclovir 450 milliGRAM(s) Oral daily    MEDICATIONS  (PRN):  HYDROmorphone   Solution 1 milliGRAM(s) Oral every 6 hours PRN Moderate Pain (4 - 6)  HYDROmorphone   Tablet 2 milliGRAM(s) Oral every 6 hours PRN Severe Pain (7 - 10)  LORazepam     Tablet 0.5 milliGRAM(s) Oral once PRN Anxiety      CAPILLARY BLOOD GLUCOSE      POCT Blood Glucose.: 168 mg/dL (2023 22:21)  POCT Blood Glucose.: 118 mg/dL (2023 17:09)  POCT Blood Glucose.: 129 mg/dL (2023 12:21)  POCT Blood Glucose.: 107 mg/dL (2023 08:10)    I&O's Summary    2023 07:01  -  2023 07:00  --------------------------------------------------------  IN: 240 mL / OUT: 800 mL / NET: -560 mL        PHYSICAL EXAM:  Vital Signs Last 24 Hrs  T(C): 36.7 (2023 07:01), Max: 36.9 (2023 20:43)  T(F): 98.1 (2023 07:01), Max: 98.5 (2023 20:43)  HR: 64 (2023 07:01) (64 - 80)  BP: 112/52 (2023 07:01) (112/52 - 135/52)  BP(mean): --  RR: 18 (2023 07:01) (18 - 20)  SpO2: 95% (2023 07:01) (95% - 98%)    Parameters below as of 2023 07:01  Patient On (Oxygen Delivery Method): room air        GENERAL: No apparent distress.   HEAD:  Atraumatic, Normocephalic  EYES: EOMI. Pupils 2mm b/l and reactive to light. left eye coloboma.   CHEST/LUNG: Clear to auscultation bilateral and symmetric; No wheezes, rales, or rhonchi  HEART: S1 and S2 normal. Regular rate and rhythm; No murmurs, rubs, or gallops  ABDOMEN: Soft, non-tender, globose  EXTREMITIES:  2+ peripheral pulses b/l, No clubbing, cyanosis, or edema  NEUROLOGY: A&O x 3, follows simple instructions. Maintaining attention. UE 5/5, LE 4/5 equal. Improved range of motion of lower extremity.     LABS:                        10.4   6.26  )-----------( 293      ( 2023 07:29 )             32.9     06-20    139  |  104  |  44<H>  ----------------------------<  108<H>  3.8   |  21<L>  |  2.30<H>    Ca    9.5      2023 07:24  Phos  4.5     06-  Mg     2.5     -    TPro  7.6  /  Alb  4.0  /  TBili  0.2  /  DBili  x   /  AST  11  /  ALT  16  /  AlkPhos  119  -          Urinalysis Basic - ( 2023 13:18 )    Color: Yellow / Appearance: Slightly Turbid / S.017 / pH: x  Gluc: x / Ketone: Negative  / Bili: Negative / Urobili: Negative   Blood: x / Protein: 100 mg/dl / Nitrite: Negative   Leuk Esterase: Large / RBC: 24 /hpf /  /HPF   Sq Epi: x / Non Sq Epi: x / Bacteria: Negative        Culture - Urine (collected 2023 13:19)  Source: Clean Catch Clean Catch (Midstream)  Final Report (2023 14:39):    <10,000 CFU/mL Normal Urogenital Shantel    Culture - Blood (collected 2023 15:40)  Source: .Blood Blood-Peripheral  Preliminary Report (2023 20:02):    No growth to date.        RADIOLOGY & ADDITIONAL TESTS:  Lab Results Reviewed   Imaging Reviewed  Electrocardiogram Reviewed

## 2023-06-21 NOTE — PROGRESS NOTE ADULT - PROBLEM SELECTOR PLAN 4
POCUS with R hip effusion but low concern of sepsis  - CT A&P with unremarkable effusion POCUS with R hip effusion but low concern of sepsis  - CT A&P with unremarkable effusion  - Patient with improvement in ROM & pain

## 2023-06-22 NOTE — PRE PROCEDURE NOTE - PRE PROCEDURE EVALUATION
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 Pt states this pain began ~2 days ago, . Patient with right hip effusion, IR consulted for aspiration.   Allergies: codeine (Anaphylaxis)    Medications (Abx/Cardiac/Anticoagulation/Blood Products)    heparin   Injectable: 5000 Unit(s) SubCutaneous (06-22 @ 13:28)  rifAXIMin: 550 milliGRAM(s) Oral (06-22 @ 05:31)  valGANciclovir: 450 milliGRAM(s) Oral (06-22 @ 11:46)    Data:    T(C): 36.4  HR: 63  BP: 134/51  RR: 16  SpO2: 97%    Exam  General: No acute distress  Chest: Non labored breathing  Abdomen: Non-distended  Extremities: No swelling, warm    -WBC 6.86 / HgB 9.6 / Hct 31.3 / Plt 298  -Na 139 / Cl 106 / BUN 58 / Glucose 113  -K 3.9 / CO2 21 / Cr 2.68  -ALT 10 / Alk Phos 107 / T.Bili 0.2    Imaging:     Plan:   67y M with hx of obesity, IDDM, HFpEF (EF 65% as of 12/2021), CKD, 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 Pt states this pain began ~2 days ago, . Patient with right hip effusion, IR consulted for aspiration.     -- Relevant imaging and labs were reviewed.   -- No additional antibiotics are indicated for this procedure.   -- Risks, benefits, and alternatives were explained to the patient and informed consent was obtained.    Anam Collier M.D.  PGY3/R2, Interventional Radiology    -Available on [employer] TEAMS for all non-urgent questions  -Emergent issues: [facility]-p.[phone]; [facility]-p.[phone] ([phone])  -Non-emergent consults: Please place a [place] order "Consult-Interventional Radiology" with an appropriate callback number  -Scheduling questions: [facility]: [phone]; [facility]: [phone]  -Clinic/Outpatient booking: [facility]: [phone]; [facility]: [phone]

## 2023-06-22 NOTE — PROGRESS NOTE ADULT - ASSESSMENT
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 Found to have + corynebacterium in 2/4 cultures. MRI showing - no evidence of OM/discitis, degenerative changes present.  BCx 6/13 NGTD. TTE without evidence of endocarditis/moderate AR/mildAS, normal LVEF. Incomplete rt foot MRI showing possible OM. Course complicated by delirium 2/2 ertapenem neurotoxicity. Significant improvement in mental status.

## 2023-06-22 NOTE — PROGRESS NOTE ADULT - PROBLEM SELECTOR PLAN 4
POCUS with R hip effusion but low concern of sepsis  - CT A&P with unremarkable effusion  - Patient with improvement in ROM & pain  - IR consulted for drainage

## 2023-06-22 NOTE — PROGRESS NOTE ADULT - ATTENDING COMMENTS
68 y/o M with a history notable for JEAN s/p OLT 7/2020 (on MMF + pred), multiple prior infections including MDRO UTIs, bacteremia, CMV viremia presenting with F and R sided back pain found to have L sided ureteral stone, and corynebacterium bacteremia. C/c/b encephalopathy  #Corynebacterium bacteremia  #s/p OLT  #CKD  #CMV  #Back Pain  #Encephalopathy  - bacteremia of unclear source most likely previous TMA site, MRI poor but ?OM, patient not tolerating repeat MRIs. Concern also exists for possible fluid collection in R hip, confirmed on CT. R hip fluid collection aspiration today with IR. Discussed with Dr. Pérez, ID. Fluid analysis will determine antibiotic duration,. Will likely need PICC prior to DC for abx  - blood cultures positive for corynebacterium, repeat BCx NG final and NGTD, TTE no infective endocarditis   - urine cultures growing GNR, s/p tx w Ertapenem, now DC'd on discussion w Txp ID d/t encephalopathy  - mental status now markedly improved (at baseline A&Ox3), conversant, non focal neuro exam, likely 2/2 Ertapenem given improvement after discontinuation       - CTH showing possibly old R thalamic lacunar infarct, not seen on prior CTH from June 2022. Unlikely that this would be etiology of current presentation       - MR brain if tolerates, can pursue as outpatient  - Continue MMF + pred, holding everolimus, restart in future as per hepatology  - MRI spine with cervical and lumbar DJD, c/w pain control  - rest of plan as above  - DC planning. Patient refusing rehab, prefers home w Home PT .

## 2023-06-22 NOTE — PROGRESS NOTE ADULT - PROBLEM SELECTOR PLAN 2
Presents with R flank pain radiating to the right side of the abdomen; UA + LE and +WBC  CTAP - 6 x 3 mm stone seen within the left distal ureter with L hydronephrosis; asymm urinary bladder thickening; L>R perinephric stranding;  - No intervention per urology  - medical management for stone  - MRI spine without evidence of OM or discitis.   - Pain management per Chronic pain service.  - MR foot repeat pending, relatively incomplete showing possible OM of 4th and 5th metatarsal/cuneiform.  - per podiatry no surgical intervention for rt foot

## 2023-06-22 NOTE — PROGRESS NOTE ADULT - SUBJECTIVE AND OBJECTIVE BOX
Internal Medicine   Eduarda Bynum | PGY-2    OVERNIGHT EVENTS:      SUBJECTIVE:       MEDICATIONS  (STANDING):  acetaminophen     Tablet .. 975 milliGRAM(s) Oral every 8 hours  acetaminophen   IVPB .. 1000 milliGRAM(s) IV Intermittent once  chlorhexidine 4% Liquid 1 Application(s) Topical daily  dextrose 5%. 1000 milliLiter(s) (50 mL/Hr) IV Continuous <Continuous>  dextrose 5%. 1000 milliLiter(s) (100 mL/Hr) IV Continuous <Continuous>  dextrose 50% Injectable 25 Gram(s) IV Push once  dextrose 50% Injectable 12.5 Gram(s) IV Push once  dextrose 50% Injectable 25 Gram(s) IV Push once  dextrose Oral Gel 15 Gram(s) Oral once  folic acid 1 milliGRAM(s) Oral daily  glucagon  Injectable 1 milliGRAM(s) IntraMuscular once  heparin   Injectable 5000 Unit(s) SubCutaneous every 8 hours  insulin glargine Injectable (LANTUS) 10 Unit(s) SubCutaneous at bedtime  insulin lispro (ADMELOG) corrective regimen sliding scale   SubCutaneous three times a day before meals  insulin lispro (ADMELOG) corrective regimen sliding scale   SubCutaneous at bedtime  lidocaine   4% Patch 3 Patch Transdermal every 24 hours  melatonin 3 milliGRAM(s) Oral at bedtime  mycophenolate mofetil 500 milliGRAM(s) Oral two times a day  pantoprazole    Tablet 40 milliGRAM(s) Oral before breakfast  predniSONE   Tablet 10 milliGRAM(s) Oral daily  QUEtiapine 25 milliGRAM(s) Oral at bedtime  rifAXIMin 550 milliGRAM(s) Oral two times a day  tamsulosin 0.4 milliGRAM(s) Oral at bedtime  valGANciclovir 450 milliGRAM(s) Oral daily    MEDICATIONS  (PRN):  HYDROmorphone   Solution 1 milliGRAM(s) Oral every 6 hours PRN Moderate Pain (4 - 6)  HYDROmorphone   Tablet 2 milliGRAM(s) Oral every 6 hours PRN Severe Pain (7 - 10)        T(F): 98.1 (06-22-23 @ 05:38), Max: 98.4 (06-21-23 @ 22:35)  HR: 64 (06-22-23 @ 05:38) (64 - 72)  BP: 127/47 (06-22-23 @ 05:38) (115/50 - 127/47)  BP(mean): --  RR: 18 (06-22-23 @ 05:38) (18 - 18)  SpO2: 95% (06-22-23 @ 05:38) (95% - 99%)    PHYSICAL EXAM:     GENERAL: NAD, lying in bed comfortably.  HEAD:  Atraumatic, normocephalic.  EYES: EOMI, PERRLA, conjunctiva and sclera clear, no nystagmus noted.  ENT: Moist mucous membranes.   NECK: Supple. No JVD. Trachea midline.  CHEST/LUNG: CTAB. No rales, rhonchi, wheezing, or rubs. Unlabored respirations.  HEART: RRR, no M/R/G, normal S1/S2.  ABDOMEN: Soft, nontender, nondistended, no organomegaly. Normoactive bowel sounds.  EXTREMITIES:  2+ peripheral pulses b/l, brisk capillary refill. No clubbing, cyanosis, or edema.  MSK: No gross deformities noted.   NEURO:  AAOx3, no focal deficits.   SKIN: No rashes or lesions.  PSYCH: Normal mood, affect.    TELEMETRY:    LABS:                        9.6    6.86  )-----------( 298      ( 22 Jun 2023 07:08 )             31.3     06-22    139  |  106  |  58<H>  ----------------------------<  113<H>  3.9   |  21<L>  |  2.68<H>    Ca    9.4      22 Jun 2023 07:06  Phos  3.5     06-22  Mg     2.4     06-22    TPro  6.9  /  Alb  3.6  /  TBili  0.2  /  DBili  x   /  AST  7<L>  /  ALT  10  /  AlkPhos  107  06-22            Creatinine Trend: 2.68<--, 2.75<--, 2.30<--, 2.35<--, 2.11<--, 2.01<--  I&O's Summary    21 Jun 2023 07:01  -  22 Jun 2023 07:00  --------------------------------------------------------  IN: 860 mL / OUT: 600 mL / NET: 260 mL    22 Jun 2023 07:01  -  22 Jun 2023 11:17  --------------------------------------------------------  IN: 0 mL / OUT: 200 mL / NET: -200 mL      BNP    RADIOLOGY & ADDITIONAL STUDIES:             Internal Medicine   Eduarda Bynum | PGY-2    OVERNIGHT EVENTS: MARS      SUBJECTIVE: Patient was seen and examined at bedside this morning. Reports improvement of pain. Denies any nausea/vomiting/diarrhea, HA, shortness of breath, abdominal pain or chest pain/palpitations. Patient responding appropriately to questions and able to make needs known.       MEDICATIONS  (STANDING):  acetaminophen     Tablet .. 975 milliGRAM(s) Oral every 8 hours  acetaminophen   IVPB .. 1000 milliGRAM(s) IV Intermittent once  chlorhexidine 4% Liquid 1 Application(s) Topical daily  dextrose 5%. 1000 milliLiter(s) (50 mL/Hr) IV Continuous <Continuous>  dextrose 5%. 1000 milliLiter(s) (100 mL/Hr) IV Continuous <Continuous>  dextrose 50% Injectable 25 Gram(s) IV Push once  dextrose 50% Injectable 12.5 Gram(s) IV Push once  dextrose 50% Injectable 25 Gram(s) IV Push once  dextrose Oral Gel 15 Gram(s) Oral once  folic acid 1 milliGRAM(s) Oral daily  glucagon  Injectable 1 milliGRAM(s) IntraMuscular once  heparin   Injectable 5000 Unit(s) SubCutaneous every 8 hours  insulin glargine Injectable (LANTUS) 10 Unit(s) SubCutaneous at bedtime  insulin lispro (ADMELOG) corrective regimen sliding scale   SubCutaneous three times a day before meals  insulin lispro (ADMELOG) corrective regimen sliding scale   SubCutaneous at bedtime  lidocaine   4% Patch 3 Patch Transdermal every 24 hours  melatonin 3 milliGRAM(s) Oral at bedtime  mycophenolate mofetil 500 milliGRAM(s) Oral two times a day  pantoprazole    Tablet 40 milliGRAM(s) Oral before breakfast  predniSONE   Tablet 10 milliGRAM(s) Oral daily  QUEtiapine 25 milliGRAM(s) Oral at bedtime  rifAXIMin 550 milliGRAM(s) Oral two times a day  tamsulosin 0.4 milliGRAM(s) Oral at bedtime  valGANciclovir 450 milliGRAM(s) Oral daily    MEDICATIONS  (PRN):  HYDROmorphone   Solution 1 milliGRAM(s) Oral every 6 hours PRN Moderate Pain (4 - 6)  HYDROmorphone   Tablet 2 milliGRAM(s) Oral every 6 hours PRN Severe Pain (7 - 10)        T(F): 98.1 (06-22-23 @ 05:38), Max: 98.4 (06-21-23 @ 22:35)  HR: 64 (06-22-23 @ 05:38) (64 - 72)  BP: 127/47 (06-22-23 @ 05:38) (115/50 - 127/47)  BP(mean): --  RR: 18 (06-22-23 @ 05:38) (18 - 18)  SpO2: 95% (06-22-23 @ 05:38) (95% - 99%)    PHYSICAL EXAM:     GENERAL: NAD, lying in bed comfortably.  HEAD:  Atraumatic, normocephalic.  NECK: Supple.   CHEST/LUNG: CTAB. No rales, rhonchi, wheezing, or rubs. Unlabored respirations.  HEART: RRR, no M/R/G, normal S1/S2.  ABDOMEN: Soft, nontender, nondistended. Normoactive bowel sounds.  EXTREMITIES:  2+ peripheral pulses b/l. No clubbing, cyanosis, or edema.  MSK: No gross deformities noted.   NEURO:  AAOx3, no focal deficits.   SKIN: No rashes or lesions.  PSYCH: Normal mood, affect.      LABS:                        9.6    6.86  )-----------( 298      ( 22 Jun 2023 07:08 )             31.3     06-22    139  |  106  |  58<H>  ----------------------------<  113<H>  3.9   |  21<L>  |  2.68<H>    Ca    9.4      22 Jun 2023 07:06  Phos  3.5     06-22  Mg     2.4     06-22    TPro  6.9  /  Alb  3.6  /  TBili  0.2  /  DBili  x   /  AST  7<L>  /  ALT  10  /  AlkPhos  107  06-22            Creatinine Trend: 2.68<--, 2.75<--, 2.30<--, 2.35<--, 2.11<--, 2.01<--  I&O's Summary    21 Jun 2023 07:01  -  22 Jun 2023 07:00  --------------------------------------------------------  IN: 860 mL / OUT: 600 mL / NET: 260 mL    22 Jun 2023 07:01  -  22 Jun 2023 11:17  --------------------------------------------------------  IN: 0 mL / OUT: 200 mL / NET: -200 mL      BNP    RADIOLOGY & ADDITIONAL STUDIES:

## 2023-06-22 NOTE — CHART NOTE - NSCHARTNOTEFT_GEN_A_CORE
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 Pt states this pain began ~2 days ago, . Patient with right hip effusion, IR consulted for aspiration.       - case reviewed and approved for today 6/22.   - please place IR procedure order under Dr. Guillen  - STAT labs in AM (cbc,coags, bmp, T&S)  - d/w primary team

## 2023-06-22 NOTE — PROGRESS NOTE ADULT - PROBLEM SELECTOR PLAN 3
BCx positive for corynebacterium  etiology skin surgery on scalp vs rt foot OM vs left ureteral stone  - c/w vancomycin  - repeat BCx  - TTE - no evidence of endocarditis, aortic valve anatomy not evaluated.

## 2023-06-22 NOTE — PROGRESS NOTE ADULT - PROBLEM SELECTOR PLAN 5
dehydration due to AMS vs Vancomycin-induced vs AIN  - FeNA 0.6%  - Creatinine uptrending to 2.75  - IVF  - Kidney & Bladder US negative for hydronephrosis  - good PO intake

## 2023-06-22 NOTE — PROGRESS NOTE ADULT - PROBLEM SELECTOR PLAN 1
Pt with increasing confusion, non-focal exam. Likely ertapenem induced  Likely due to lyrica iso CKD vs robaxin vs hospital-acquired, infection - bcx cleared, concern for ertapenem contribution.   - CTH without evidence of acute pathology  - d/c ertapenem  - Transplant ID following  - Psych consulted; Seroquel HS ordered; ECG performed QTc <500  - on rifaximin for possible but low likelihood HE  - back to baseline MS

## 2023-06-22 NOTE — PROGRESS NOTE ADULT - SUBJECTIVE AND OBJECTIVE BOX
Follow Up:      Interval History:    REVIEW OF SYSTEMS  [  ] ROS unobtainable because:    [  ] All other systems negative except as noted below    Constitutional:  [ ] fever [ ] chills  [ ] weight loss  [ ] weakness  Skin:  [ ] rash [ ] phlebitis	  Eyes: [ ] icterus [ ] pain  [ ] discharge	  ENMT: [ ] sore throat  [ ] thrush [ ] ulcers [ ] exudates  Respiratory: [ ] dyspnea [ ] hemoptysis [ ] cough [ ] sputum	  Cardiovascular:  [ ] chest pain [ ] palpitations [ ] edema	  Gastrointestinal:  [ ] nausea [ ] vomiting [ ] diarrhea [ ] constipation [ ] pain	  Genitourinary:  [ ] dysuria [ ] frequency [ ] hematuria [ ] discharge [ ] flank pain  [ ] incontinence  Musculoskeletal:  [ ] myalgias [ ] arthralgias [ ] arthritis  [ ] back pain  Neurological:  [ ] headache [ ] seizures  [ ] confusion/altered mental status    Allergies  codeine (Anaphylaxis)        ANTIMICROBIALS:  rifAXIMin 550 two times a day  valGANciclovir 450 daily      OTHER MEDS:  MEDICATIONS  (STANDING):  acetaminophen     Tablet .. 975 every 8 hours  acetaminophen   IVPB .. 1000 once  dextrose 50% Injectable 25 once  dextrose 50% Injectable 25 once  dextrose 50% Injectable 12.5 once  dextrose Oral Gel 15 once  glucagon  Injectable 1 once  heparin   Injectable 5000 every 8 hours  HYDROmorphone   Solution 1 every 6 hours PRN  HYDROmorphone   Tablet 2 every 6 hours PRN  insulin glargine Injectable (LANTUS) 10 at bedtime  insulin lispro (ADMELOG) corrective regimen sliding scale  three times a day before meals  insulin lispro (ADMELOG) corrective regimen sliding scale  at bedtime  melatonin 3 at bedtime  mycophenolate mofetil 500 two times a day  pantoprazole    Tablet 40 before breakfast  predniSONE   Tablet 10 daily  QUEtiapine 25 at bedtime  tamsulosin 0.4 at bedtime      Vital Signs Last 24 Hrs  T(C): 36.4 (22 Jun 2023 11:42), Max: 36.9 (21 Jun 2023 22:35)  T(F): 97.5 (22 Jun 2023 11:42), Max: 98.4 (21 Jun 2023 22:35)  HR: 63 (22 Jun 2023 11:42) (63 - 64)  BP: 134/51 (22 Jun 2023 11:42) (122/46 - 134/51)  BP(mean): --  RR: 16 (22 Jun 2023 11:42) (16 - 18)  SpO2: 97% (22 Jun 2023 11:42) (95% - 99%)    Parameters below as of 22 Jun 2023 11:42  Patient On (Oxygen Delivery Method): room air        PHYSICAL EXAMINATION:  General: Alert and Awake, NAD  HEENT: PERRL, EOMI  Neck: Supple  Cardiac: RRR, No M/R/G  Resp: CTAB, No Wh/Rh/Ra  Abdomen: NBS, NT/ND, No HSM, No rigidity or guarding  MSK: No LE edema. No Calf tenderness  : No bob  Skin: No rashes or lesions. Skin is warm and dry to the touch.   Neuro: Alert and Awake. CN 2-12 Grossly intact. Moves all four extremities spontaneously.  Psych: Calm, Pleasant, Cooperative                          9.6    6.86  )-----------( 298      ( 22 Jun 2023 07:08 )             31.3       06-22    139  |  106  |  58<H>  ----------------------------<  113<H>  3.9   |  21<L>  |  2.68<H>    Ca    9.4      22 Jun 2023 07:06  Phos  3.5     06-22  Mg     2.4     06-22    TPro  6.9  /  Alb  3.6  /  TBili  0.2  /  DBili  x   /  AST  7<L>  /  ALT  10  /  AlkPhos  107  06-22      Urinalysis Basic - ( 22 Jun 2023 07:06 )    Color: x / Appearance: x / SG: x / pH: x  Gluc: 113 mg/dL / Ketone: x  / Bili: x / Urobili: x   Blood: x / Protein: x / Nitrite: x   Leuk Esterase: x / RBC: x / WBC x   Sq Epi: x / Non Sq Epi: x / Bacteria: x        MICROBIOLOGY:  Vancomycin Level, Random: 16.4 ug/mL (06-22-23 @ 07:16)  v  Clean Catch Clean Catch (Midstream)  06-19-23   <10,000 CFU/mL Normal Urogenital Shantel  --  --      .Blood Blood-Peripheral  06-18-23   No growth to date.  --  --      .Blood Blood-Peripheral  06-17-23   No growth to date.  --  --      .Blood Blood-Peripheral  06-14-23   No Growth Final  --  --      .Blood Blood  06-13-23   No Growth Final  --  --      .Blood Blood  06-11-23   Growth in aerobic and anaerobic bottles: Corynebacterium striatum group  Sent out to reference laboratory for susceptibility testing.  See accession # [id number] for report.  --    Growth in aerobic bottle: Gram Positive Rods  Growth in anaerobic bottle: Gram Positive Rods      .Blood Blood  06-11-23   Growth in aerobic and anaerobic bottles: Corynebacterium striatum group  "Susceptibilities not performed"  ***Blood Panel PCR results on this specimen are available  approximately 3 hours after the Gram stain result.***  Gram stain, PCR, and/or culture results may not always  correspond due to difference in methodologies.  ************************************************************  This PCR assay was performed by multiplex PCR. This  Assay tests for 66 bacterial and resistance gene targets.  Please refer to the [facility] test directory  at [email or web address] for details.  --  Blood Culture PCR      Clean Catch Clean Catch (Midstream)  06-11-23   <10,000 CFU/mL Normal Urogenital Shantel  --  --          Rapid RVP Result: Candetenolberto (06-19 @ 11:37)        RADIOLOGY:    <The imaging below has been reviewed and visualized by me independently. Findings as detailed in report below> Follow Up:  Bacteremia    Interval History: afebrile. no acute events.     REVIEW OF SYSTEMS  [  ] ROS unobtainable because:    [ x ] All other systems negative except as noted below    Constitutional:  [ ] fever [ ] chills  [ ] weight loss  [ ] weakness  Skin:  [ ] rash [ ] phlebitis	  Eyes: [ ] icterus [ ] pain  [ ] discharge	  ENMT: [ ] sore throat  [ ] thrush [ ] ulcers [ ] exudates  Respiratory: [ ] dyspnea [ ] hemoptysis [ ] cough [ ] sputum	  Cardiovascular:  [ ] chest pain [ ] palpitations [ ] edema	  Gastrointestinal:  [ ] nausea [ ] vomiting [ x] diarrhea [ ] constipation [ ] pain	  Genitourinary:  [ ] dysuria [ ] frequency [ ] hematuria [ ] discharge [ ] flank pain  [ ] incontinence  Musculoskeletal:  [ ] myalgias [ ] arthralgias [ ] arthritis  [ ] back pain  Neurological:  [ ] headache [ ] seizures  [ ] confusion/altered mental status      Allergies  codeine (Anaphylaxis)        ANTIMICROBIALS:  rifAXIMin 550 two times a day  valGANciclovir 450 daily      OTHER MEDS:  MEDICATIONS  (STANDING):  acetaminophen     Tablet .. 975 every 8 hours  acetaminophen   IVPB .. 1000 once  dextrose 50% Injectable 25 once  dextrose 50% Injectable 25 once  dextrose 50% Injectable 12.5 once  dextrose Oral Gel 15 once  glucagon  Injectable 1 once  heparin   Injectable 5000 every 8 hours  HYDROmorphone   Solution 1 every 6 hours PRN  HYDROmorphone   Tablet 2 every 6 hours PRN  insulin glargine Injectable (LANTUS) 10 at bedtime  insulin lispro (ADMELOG) corrective regimen sliding scale  three times a day before meals  insulin lispro (ADMELOG) corrective regimen sliding scale  at bedtime  melatonin 3 at bedtime  mycophenolate mofetil 500 two times a day  pantoprazole    Tablet 40 before breakfast  predniSONE   Tablet 10 daily  QUEtiapine 25 at bedtime  tamsulosin 0.4 at bedtime      Vital Signs Last 24 Hrs  T(C): 36.4 (22 Jun 2023 11:42), Max: 36.9 (21 Jun 2023 22:35)  T(F): 97.5 (22 Jun 2023 11:42), Max: 98.4 (21 Jun 2023 22:35)  HR: 63 (22 Jun 2023 11:42) (63 - 64)  BP: 134/51 (22 Jun 2023 11:42) (122/46 - 134/51)  BP(mean): --  RR: 16 (22 Jun 2023 11:42) (16 - 18)  SpO2: 97% (22 Jun 2023 11:42) (95% - 99%)    Parameters below as of 22 Jun 2023 11:42  Patient On (Oxygen Delivery Method): room air    PHYSICAL EXAMINATION:  General: Alert and Awake, NAD  HEENT: L sides scalp skin biopsy site with no erythema or drainage  Resp: No accessory muscles of respiration utilized  Abdomen: Not distended.  MSK: RLE Foot TMA with no erythema, drainage or obvious open wound.   Skin: As per MSK and HEENT section.    Neuro: Alert and Awake. CN 2-12 Grossly intact. Moves all four extremities spontaneously.  Psych: Calm, Pleasant, Cooperative                          9.6    6.86  )-----------( 298      ( 22 Jun 2023 07:08 )             31.3       06-22    139  |  106  |  58<H>  ----------------------------<  113<H>  3.9   |  21<L>  |  2.68<H>    Ca    9.4      22 Jun 2023 07:06  Phos  3.5     06-22  Mg     2.4     06-22    TPro  6.9  /  Alb  3.6  /  TBili  0.2  /  DBili  x   /  AST  7<L>  /  ALT  10  /  AlkPhos  107  06-22      Urinalysis Basic - ( 22 Jun 2023 07:06 )    Color: x / Appearance: x / SG: x / pH: x  Gluc: 113 mg/dL / Ketone: x  / Bili: x / Urobili: x   Blood: x / Protein: x / Nitrite: x   Leuk Esterase: x / RBC: x / WBC x   Sq Epi: x / Non Sq Epi: x / Bacteria: x    MICROBIOLOGY:    Vancomycin Level, Random: 16.4 ug/mL (06-22-23 @ 07:16)    Clean Catch Clean Catch (Midstream)  06-19-23   <10,000 CFU/mL Normal Urogenital Shantel  --  --      .Blood Blood-Peripheral  06-18-23   No growth to date.  --  --      .Blood Blood-Peripheral  06-17-23   No growth to date.  --  --      .Blood Blood-Peripheral  06-14-23   No Growth Final  --  --      .Blood Blood  06-13-23   No Growth Final  --  --      .Blood Blood  06-11-23   Growth in aerobic and anaerobic bottles: Corynebacterium striatum group  Sent out to reference laboratory for susceptibility testing.  See accession # [id number] for report.  --    Growth in aerobic bottle: Gram Positive Rods  Growth in anaerobic bottle: Gram Positive Rods      .Blood Blood  06-11-23   Growth in aerobic and anaerobic bottles: Corynebacterium striatum group  "Susceptibilities not performed"  ***Blood Panel PCR results on this specimen are available  approximately 3 hours after the Gram stain result.***  Gram stain, PCR, and/or culture results may not always  correspond due to difference in methodologies.  ************************************************************  This PCR assay was performed by multiplex PCR. This  Assay tests for 66 bacterial and resistance gene targets.  Please refer to the [facility] Labs test directory  at [email or web address] for details.  --  Blood Culture PCR      Clean Catch Clean Catch (Midstream)  06-11-23   <10,000 CFU/mL Normal Urogenital Shantel  --  --    Rapid RVP Result: NotDetec (06-19 @ 11:37)    RADIOLOGY:    <The imaging below has been reviewed and visualized by me independently. Findings as detailed in report below>    < from: CT Hip No Cont, Right (06.21.23 @ 22:18) >  IMPRESSION:    Mild to moderate hip arthrosis.  Nonspecific moderate hip joint effusion.  MRI can be performed for more detailed evaluation of clinicallyindicated.    < end of copied text >

## 2023-06-22 NOTE — PROCEDURE NOTE - PROCEDURE FINDINGS AND DETAILS
Right hip joint aspiration with 7 ccs of clearish yellow fluid removed sent for cell count, gram stain and culture.

## 2023-06-22 NOTE — PROGRESS NOTE ADULT - ASSESSMENT
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 Pt states this pain began ~2 days ago, .     -CTAP 6 x 3 mm stone seen within the left distal ureter with L hydronephrosis; asymm urinary bladder thickening; L>R perinephric stranding; possible distal esophagitis; trace pelvic fluid with right pelvic surgical clip  -ED POCUS: right hip effusion    Found to have 4/4 bottles with Corynebacterium  Unclear if contaminant or true pathogen but would favor coverage  RLE Foot, scalp skin biopsy site or obstructing nephrolithiasis are possible initial foci  TTE (6/14) moderate AS & mild AR, trace MR, trace PA  Lumbar Spine MRI without findings of infection  R Foot without overt superficial evidence of infection  MRI R Foot was very limited but questioning presence of OM findings.    #Positive BCx, Abnormal MRI Foot (RLE), Abnormal US (POCUS of R Hip)  --R MRI Hip with moderate effusion - planned for IR aspiration - would send for cell counts with differential, fluid culture  --Can hold Vancomycin again today. Will repeat random level with AM labs. Tentatively plan for 14 day course if no deeper seated focus identified (6/12 --> 6/25)  --Ordered for repeat MRI R Foot; discussed with Podiatry low concern for true OM   --Ertapenem discontinued on 6/19 given otherwise unrevealing cultures for gram negatives and question posed if it could be contributing to his encephalopathy  --Continue to follow CBC with diff  --Continue to follow temperature curve  --Follow up on preliminary blood cultures    #L Hydronephrosis (and obstructing nephrolithiasis)  Appears stable from 2022   UCx (6/11, 6/19) No Growth  Does serve as a possible, albeit unlikely, focus for the Corynebacterium  --Continue to monitor clinical course    #Encephalopathy  Favor medication induced  Improved today  Continue to Monitor    #OLT Recipient  CMV PCR (6/13) Negative  --Continue Valcyte 450 mg PO Q24H    I will continue to follow. Please feel free to contact me with any further questions.    Eusebio Pérez M.D.  [facility] Division of Infectious Disease  8AM-5PM Monday - Friday: Available on [employer] Teams  After Hours and Holidays (or if no response on [employer] Teams): Please contact the Infectious Diseases Office at [phone]    The above assessment and plan were discussed with medicine team

## 2023-06-23 NOTE — PROGRESS NOTE ADULT - SUBJECTIVE AND OBJECTIVE BOX
PROGRESS NOTE:   Authored by Morgan Fairchild MD   Patient is a 67y old  Male who presents with a chief complaint of back pain (22 Jun 2023 15:49)      SUBJECTIVE / OVERNIGHT EVENTS:  No acute events overnight.     ADDITIONAL REVIEW OF SYSTEMS:    MEDICATIONS  (STANDING):  acetaminophen     Tablet .. 975 milliGRAM(s) Oral every 8 hours  acetaminophen   IVPB .. 1000 milliGRAM(s) IV Intermittent once  chlorhexidine 4% Liquid 1 Application(s) Topical daily  dextrose 5%. 1000 milliLiter(s) (50 mL/Hr) IV Continuous <Continuous>  dextrose 5%. 1000 milliLiter(s) (100 mL/Hr) IV Continuous <Continuous>  dextrose 50% Injectable 25 Gram(s) IV Push once  dextrose 50% Injectable 12.5 Gram(s) IV Push once  dextrose 50% Injectable 25 Gram(s) IV Push once  dextrose Oral Gel 15 Gram(s) Oral once  folic acid 1 milliGRAM(s) Oral daily  glucagon  Injectable 1 milliGRAM(s) IntraMuscular once  heparin   Injectable 5000 Unit(s) SubCutaneous every 8 hours  insulin glargine Injectable (LANTUS) 10 Unit(s) SubCutaneous at bedtime  insulin lispro (ADMELOG) corrective regimen sliding scale   SubCutaneous three times a day before meals  insulin lispro (ADMELOG) corrective regimen sliding scale   SubCutaneous at bedtime  lidocaine   4% Patch 3 Patch Transdermal every 24 hours  melatonin 3 milliGRAM(s) Oral at bedtime  mycophenolate mofetil 500 milliGRAM(s) Oral two times a day  pantoprazole    Tablet 40 milliGRAM(s) Oral before breakfast  predniSONE   Tablet 10 milliGRAM(s) Oral daily  QUEtiapine 25 milliGRAM(s) Oral at bedtime  rifAXIMin 550 milliGRAM(s) Oral two times a day  tamsulosin 0.4 milliGRAM(s) Oral at bedtime  valGANciclovir 450 milliGRAM(s) Oral daily    MEDICATIONS  (PRN):  HYDROmorphone   Solution 1 milliGRAM(s) Oral every 6 hours PRN Moderate Pain (4 - 6)  HYDROmorphone   Tablet 2 milliGRAM(s) Oral every 6 hours PRN Severe Pain (7 - 10)      CAPILLARY BLOOD GLUCOSE      POCT Blood Glucose.: 171 mg/dL (22 Jun 2023 21:45)  POCT Blood Glucose.: 177 mg/dL (22 Jun 2023 17:37)  POCT Blood Glucose.: 179 mg/dL (22 Jun 2023 12:42)  POCT Blood Glucose.: 126 mg/dL (22 Jun 2023 08:51)    I&O's Summary    22 Jun 2023 07:01  -  23 Jun 2023 07:00  --------------------------------------------------------  IN: 0 mL / OUT: 800 mL / NET: -800 mL        PHYSICAL EXAM:  Vital Signs Last 24 Hrs  T(C): 37.5 (23 Jun 2023 06:47), Max: 37.5 (23 Jun 2023 06:47)  T(F): 99.5 (23 Jun 2023 06:47), Max: 99.5 (23 Jun 2023 06:47)  HR: 91 (23 Jun 2023 06:47) (62 - 91)  BP: 136/89 (23 Jun 2023 06:47) (108/50 - 136/89)  BP(mean): --  RR: 18 (23 Jun 2023 06:47) (16 - 18)  SpO2: 99% (23 Jun 2023 06:47) (94% - 99%)    Parameters below as of 23 Jun 2023 05:35  Patient On (Oxygen Delivery Method): room air      GENERAL: NAD, lying in bed comfortably.  HEAD:  Atraumatic, normocephalic.  NECK: Supple.   CHEST/LUNG: CTAB. No rales, rhonchi, wheezing, or rubs. Unlabored respirations.  HEART: RRR, no M/R/G, normal S1/S2.  ABDOMEN: Soft, nontender, nondistended. Normoactive bowel sounds.  EXTREMITIES:  2+ peripheral pulses b/l. No clubbing, cyanosis, or edema.  MSK: No gross deformities noted.   NEURO:  AAOx3, no focal deficits.   SKIN: No rashes or lesions.  PSYCH: Normal mood, affect.    LABS:                        9.6    6.86  )-----------( 298      ( 22 Jun 2023 07:08 )             31.3     06-22    139  |  106  |  58<H>  ----------------------------<  113<H>  3.9   |  21<L>  |  2.68<H>    Ca    9.4      22 Jun 2023 07:06  Phos  3.5     06-22  Mg     2.4     06-22    TPro  6.9  /  Alb  3.6  /  TBili  0.2  /  DBili  x   /  AST  7<L>  /  ALT  10  /  AlkPhos  107  06-22          Urinalysis Basic - ( 22 Jun 2023 07:06 )    Color: x / Appearance: x / SG: x / pH: x  Gluc: 113 mg/dL / Ketone: x  / Bili: x / Urobili: x   Blood: x / Protein: x / Nitrite: x   Leuk Esterase: x / RBC: x / WBC x   Sq Epi: x / Non Sq Epi: x / Bacteria: x        Culture - Joint Fluid (collected 22 Jun 2023 17:56)  Source: Hip LEFT HIP ASPIRATION  Gram Stain (22 Jun 2023 23:28):    Moderate polymorphonuclear leukocytes per low power field    No organisms seen per oil power field        RADIOLOGY & ADDITIONAL TESTS:  Lab Results Reviewed   Imaging Reviewed  Electrocardiogram Reviewed

## 2023-06-23 NOTE — PROGRESS NOTE ADULT - ASSESSMENT
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 Pt states this pain began ~2 days ago, .     -CTAP 6 x 3 mm stone seen within the left distal ureter with L hydronephrosis; asymm urinary bladder thickening; L>R perinephric stranding; possible distal esophagitis; trace pelvic fluid with right pelvic surgical clip  -ED POCUS: right hip effusion    Found to have 4/4 bottles with Corynebacterium  Unclear if contaminant or true pathogen but would favor coverage  RLE Foot, scalp skin biopsy site or obstructing nephrolithiasis are possible initial foci  TTE (6/14) moderate AS & mild AR, trace MR, trace WI  Lumbar Spine MRI without findings of infection  R Foot without overt superficial evidence of infection  MRI R Foot was very limited but questioning presence of OM findings.    #Positive BCx, Abnormal MRI Foot (RLE), Abnormal US (POCUS of R Hip)  s/p Hip aspiration with cell counts not suggestive of septic arthritis  s/p dose of Vancomycin today which will complete his antibiotic course  --Patient should follow up with me in the Infectious Diseases Office at [address] [place] [address] in 2 weeks time for surveillance blood cultures.  Office contact number is [phone]    #L Hydronephrosis (and obstructing nephrolithiasis)  Appears stable from 2022   UCx (6/11, 6/19) No Growth  Does serve as a possible, albeit unlikely, focus for the Corynebacterium  --Continue to monitor clinical course    #Encephalopathy  Favor medication induced  Improved today  Continue to Monitor    #OLT Recipient  CMV PCR (6/13) Negative  --Continue Valcyte 450 mg PO Q24H    I will sign off at this time. Please feel free to contact me with any further questions or concerns.    Eusebio Pérez M.D.  [facility] Division of Infectious Disease  8AM-5PM Monday - Friday: Available on [employer] Teams  After Hours and Holidays (or if no response on [employer] Teams): Please contact the Infectious Diseases Office at [phone]     The above assessment and plan were discussed with medicine resident

## 2023-06-23 NOTE — PROGRESS NOTE ADULT - ATTENDING COMMENTS
68 y/o M with a history notable for JEAN s/p OLT 7/2020 (on MMF + pred), multiple prior infections including MDRO UTIs, bacteremia, CMV viremia presenting with F and R sided back pain found to have L sided ureteral stone, and corynebacterium bacteremia. C/c/b encephalopathy (resolved) s/p right hip aspiration with IR.     #Corynebacterium bacteremia  #s/p OLT  #CKD  #CMV  #Back Pain  #Encephalopathy (resolved) 2/2 ertapenum    - bacteremia of unclear source most likely previous TMA site, MRI poor but ?OM, patient not tolerating repeat MRIs. Concern also exists for possible fluid collection in R hip, confirmed on CT. R hip fluid collection aspirated with IR 6/23. Discussed with Dr. Pérez, ID. Fluid analysis will determine antibiotic duration,. Will likely need PICC prior to DC for abx  - f/u right hip fluid culture  - blood cultures positive for corynebacterium, repeat BCx NG final and NGTD, TTE no infective endocarditis   - urine cultures growing GNR, s/p tx w Ertapenem, now DC'd on discussion w Txp ID d/t encephalopathy. Stop seroquel  - mental status improved after dc ertapenum. CTH showing possibly old R thalamic lacunar infarct, not seen on prior CTH from June 2022  - Continue MMF + pred, holding everolimus, restart in future as per hepatology  - MRI spine with cervical and lumbar DJD, c/w pain control  - rest of plan as above  - DC planning. Patient refusing rehab, prefers home w Home PT    d/w HS  Mahi Almanza MD  Division of Hospital Medicine  Available on [employer] Teams

## 2023-06-23 NOTE — PROGRESS NOTE ADULT - PROBLEM SELECTOR PLAN 4
POCUS with R hip effusion but low concern of sepsis  - CT A&P with unremarkable effusion  - Patient with improvement in ROM & pain  - IR consulted for drainage POCUS with R hip effusion but low concern of sepsis  - CT A&P with unremarkable effusion  - Patient with improvement in ROM & pain  - IR consulted for drainage  - Drainage with 3750 cells, 90% neutrophils. NGTD

## 2023-06-23 NOTE — PROGRESS NOTE ADULT - SUBJECTIVE AND OBJECTIVE BOX
Follow Up:      Interval History:    REVIEW OF SYSTEMS  [  ] ROS unobtainable because:    [  ] All other systems negative except as noted below    Constitutional:  [ ] fever [ ] chills  [ ] weight loss  [ ] weakness  Skin:  [ ] rash [ ] phlebitis	  Eyes: [ ] icterus [ ] pain  [ ] discharge	  ENMT: [ ] sore throat  [ ] thrush [ ] ulcers [ ] exudates  Respiratory: [ ] dyspnea [ ] hemoptysis [ ] cough [ ] sputum	  Cardiovascular:  [ ] chest pain [ ] palpitations [ ] edema	  Gastrointestinal:  [ ] nausea [ ] vomiting [ ] diarrhea [ ] constipation [ ] pain	  Genitourinary:  [ ] dysuria [ ] frequency [ ] hematuria [ ] discharge [ ] flank pain  [ ] incontinence  Musculoskeletal:  [ ] myalgias [ ] arthralgias [ ] arthritis  [ ] back pain  Neurological:  [ ] headache [ ] seizures  [ ] confusion/altered mental status    Allergies  codeine (Anaphylaxis)        ANTIMICROBIALS:  rifAXIMin 550 two times a day  valGANciclovir 450 daily      OTHER MEDS:  MEDICATIONS  (STANDING):  acetaminophen     Tablet .. 975 every 8 hours  acetaminophen   IVPB .. 1000 once  dextrose 50% Injectable 25 once  dextrose 50% Injectable 12.5 once  dextrose 50% Injectable 25 once  dextrose Oral Gel 15 once  glucagon  Injectable 1 once  heparin   Injectable 5000 every 8 hours  HYDROmorphone   Solution 1 every 6 hours PRN  HYDROmorphone   Tablet 2 every 6 hours PRN  insulin glargine Injectable (LANTUS) 10 at bedtime  insulin lispro (ADMELOG) corrective regimen sliding scale  three times a day before meals  insulin lispro (ADMELOG) corrective regimen sliding scale  at bedtime  melatonin 3 at bedtime  mycophenolate mofetil 500 two times a day  pantoprazole    Tablet 40 before breakfast  predniSONE   Tablet 10 daily  tamsulosin 0.4 at bedtime      Vital Signs Last 24 Hrs  T(C): 36.3 (23 Jun 2023 12:00), Max: 37.5 (23 Jun 2023 06:47)  T(F): 97.4 (23 Jun 2023 12:00), Max: 99.5 (23 Jun 2023 06:47)  HR: 89 (23 Jun 2023 12:00) (62 - 91)  BP: 122/50 (23 Jun 2023 12:00) (108/50 - 136/89)  BP(mean): --  RR: 18 (23 Jun 2023 12:00) (18 - 18)  SpO2: 98% (23 Jun 2023 12:00) (94% - 99%)    Parameters below as of 23 Jun 2023 12:00  Patient On (Oxygen Delivery Method): room air        PHYSICAL EXAMINATION:  General: Alert and Awake, NAD  HEENT: PERRL, EOMI  Neck: Supple  Cardiac: RRR, No M/R/G  Resp: CTAB, No Wh/Rh/Ra  Abdomen: NBS, NT/ND, No HSM, No rigidity or guarding  MSK: No LE edema. No Calf tenderness  : No bob  Skin: No rashes or lesions. Skin is warm and dry to the touch.   Neuro: Alert and Awake. CN 2-12 Grossly intact. Moves all four extremities spontaneously.  Psych: Calm, Pleasant, Cooperative                          10.0   7.00  )-----------( 308      ( 23 Jun 2023 07:18 )             31.4       06-23    139  |  105  |  59<H>  ----------------------------<  166<H>  4.3   |  20<L>  |  2.43<H>    Ca    9.7      23 Jun 2023 07:13  Phos  3.6     06-23  Mg     2.5     06-23    TPro  6.9  /  Alb  3.5  /  TBili  0.2  /  DBili  x   /  AST  7<L>  /  ALT  9<L>  /  AlkPhos  109  06-23      Urinalysis Basic - ( 23 Jun 2023 07:13 )    Color: x / Appearance: x / SG: x / pH: x  Gluc: 166 mg/dL / Ketone: x  / Bili: x / Urobili: x   Blood: x / Protein: x / Nitrite: x   Leuk Esterase: x / RBC: x / WBC x   Sq Epi: x / Non Sq Epi: x / Bacteria: x        MICROBIOLOGY:  Vancomycin Level, Random: 14.0 ug/mL (06-23-23 @ 07:19)  v  Hip LEFT HIP ASPIRATION  06-22-23 --  --    Moderate polymorphonuclear leukocytes per low power field  No organisms seen per oil power field      Clean Catch Clean Catch (Midstream)  06-19-23   <10,000 CFU/mL Normal Urogenital Shantel  --  --      .Blood Blood-Peripheral  06-18-23   No growth to date.  --  --      .Blood Blood-Peripheral  06-17-23   No Growth Final  --  --      .Blood Blood-Peripheral  06-14-23   No Growth Final  --  --      .Blood Blood  06-13-23   No Growth Final  --  --      .Blood Blood  06-11-23   Growth in aerobic and anaerobic bottles: Corynebacterium striatum group  Sent out to reference laboratory for susceptibility testing.  See accession # [id number] for report.  --    Growth in aerobic bottle: Gram Positive Rods  Growth in anaerobic bottle: Gram Positive Rods      .Blood Blood  06-11-23   Growth in aerobic and anaerobic bottles: Corynebacterium striatum group  "Susceptibilities not performed"  ***Blood Panel PCR results on this specimen are available  approximately 3 hours after the Gram stain result.***  Gram stain, PCR, and/or culture results may not always  correspond due to difference in methodologies.  ************************************************************  This PCR assay was performed by multiplex PCR. This  Assay tests for 66 bacterial and resistance gene targets.  Please refer to the [facility] test directory  at [email or web address] for details.  --  Blood Culture PCR      Clean Catch Clean Catch (Midstream)  06-11-23   <10,000 CFU/mL Normal Urogenital Shantel  --  --          Rapid RVP Result: NotDetec (06-19 @ 11:37)        RADIOLOGY:    <The imaging below has been reviewed and visualized by me independently. Findings as detailed in report below> Follow Up:  Bacteremia    Interval History: afebrile. no acute events.     REVIEW OF SYSTEMS  [  ] ROS unobtainable because:    [ x ] All other systems negative except as noted below    Constitutional:  [ ] fever [ ] chills  [ ] weight loss  [ ] weakness  Skin:  [ ] rash [ ] phlebitis	  Eyes: [ ] icterus [ ] pain  [ ] discharge	  ENMT: [ ] sore throat  [ ] thrush [ ] ulcers [ ] exudates  Respiratory: [ ] dyspnea [ ] hemoptysis [ ] cough [ ] sputum	  Cardiovascular:  [ ] chest pain [ ] palpitations [ ] edema	  Gastrointestinal:  [ ] nausea [ ] vomiting [ ] diarrhea [ ] constipation [ ] pain	  Genitourinary:  [ ] dysuria [ ] frequency [ ] hematuria [ ] discharge [ ] flank pain  [ ] incontinence  Musculoskeletal:  [ ] myalgias [ ] arthralgias [ ] arthritis  [ ] back pain  Neurological:  [ ] headache [ ] seizures  [ ] confusion/altered mental status    Allergies  codeine (Anaphylaxis)        ANTIMICROBIALS:  rifAXIMin 550 two times a day  valGANciclovir 450 daily      OTHER MEDS:  MEDICATIONS  (STANDING):  acetaminophen     Tablet .. 975 every 8 hours  acetaminophen   IVPB .. 1000 once  dextrose 50% Injectable 25 once  dextrose 50% Injectable 12.5 once  dextrose 50% Injectable 25 once  dextrose Oral Gel 15 once  glucagon  Injectable 1 once  heparin   Injectable 5000 every 8 hours  HYDROmorphone   Solution 1 every 6 hours PRN  HYDROmorphone   Tablet 2 every 6 hours PRN  insulin glargine Injectable (LANTUS) 10 at bedtime  insulin lispro (ADMELOG) corrective regimen sliding scale  three times a day before meals  insulin lispro (ADMELOG) corrective regimen sliding scale  at bedtime  melatonin 3 at bedtime  mycophenolate mofetil 500 two times a day  pantoprazole    Tablet 40 before breakfast  predniSONE   Tablet 10 daily  tamsulosin 0.4 at bedtime      Vital Signs Last 24 Hrs  T(C): 36.3 (23 Jun 2023 12:00), Max: 37.5 (23 Jun 2023 06:47)  T(F): 97.4 (23 Jun 2023 12:00), Max: 99.5 (23 Jun 2023 06:47)  HR: 89 (23 Jun 2023 12:00) (62 - 91)  BP: 122/50 (23 Jun 2023 12:00) (108/50 - 136/89)  BP(mean): --  RR: 18 (23 Jun 2023 12:00) (18 - 18)  SpO2: 98% (23 Jun 2023 12:00) (94% - 99%)    Parameters below as of 23 Jun 2023 12:00  Patient On (Oxygen Delivery Method): room air    PHYSICAL EXAMINATION:  General: Alert and Awake, NAD  HEENT: L sides scalp skin biopsy site with no erythema or drainage  Resp: No accessory muscles of respiration utilized  Abdomen: Not distended.  MSK: RLE Foot TMA with no erythema, drainage or obvious open wound.   Skin: As per MSK and HEENT section.    Neuro: Alert and Awake. CN 2-12 Grossly intact. Moves all four extremities spontaneously.  Psych: Calm, Pleasant, Cooperative                          10.0   7.00  )-----------( 308      ( 23 Jun 2023 07:18 )             31.4       06-23    139  |  105  |  59<H>  ----------------------------<  166<H>  4.3   |  20<L>  |  2.43<H>    Ca    9.7      23 Jun 2023 07:13  Phos  3.6     06-23  Mg     2.5     06-23    TPro  6.9  /  Alb  3.5  /  TBili  0.2  /  DBili  x   /  AST  7<L>  /  ALT  9<L>  /  AlkPhos  109  06-23      Urinalysis Basic - ( 23 Jun 2023 07:13 )    Color: x / Appearance: x / SG: x / pH: x  Gluc: 166 mg/dL / Ketone: x  / Bili: x / Urobili: x   Blood: x / Protein: x / Nitrite: x   Leuk Esterase: x / RBC: x / WBC x   Sq Epi: x / Non Sq Epi: x / Bacteria: x        MICROBIOLOGY:  Vancomycin Level, Random: 14.0 ug/mL (06-23-23 @ 07:19)  v  Hip LEFT HIP ASPIRATION  06-22-23 --  --    Moderate polymorphonuclear leukocytes per low power field  No organisms seen per oil power field      Clean Catch Clean Catch (Midstream)  06-19-23   <10,000 CFU/mL Normal Urogenital Shantel  --  --      .Blood Blood-Peripheral  06-18-23   No growth to date.  --  --      .Blood Blood-Peripheral  06-17-23   No Growth Final  --  --      .Blood Blood-Peripheral  06-14-23   No Growth Final  --  --      .Blood Blood  06-13-23   No Growth Final  --  --      .Blood Blood  06-11-23   Growth in aerobic and anaerobic bottles: Corynebacterium striatum group  Sent out to reference laboratory for susceptibility testing.  See accession # [id number] for report.  --    Growth in aerobic bottle: Gram Positive Rods  Growth in anaerobic bottle: Gram Positive Rods      .Blood Blood  06-11-23   Growth in aerobic and anaerobic bottles: Corynebacterium striatum group  "Susceptibilities not performed"  ***Blood Panel PCR results on this specimen are available  approximately 3 hours after the Gram stain result.***  Gram stain, PCR, and/or culture results may not always  correspond due to difference in methodologies.  ************************************************************  This PCR assay was performed by multiplex PCR. This  Assay tests for 66 bacterial and resistance gene targets.  Please refer to the [facility] test directory  at [email or web address] for details.  --  Blood Culture PCR      Clean Catch Clean Catch (Midstream)  06-11-23   <10,000 CFU/mL Normal Urogenital Shantel  --  --          Rapid RVP Result: NotDetec (06-19 @ 11:37)        RADIOLOGY:    <The imaging below has been reviewed and visualized by me independently. Findings as detailed in report below>    < from: CT Hip No Cont, Right (06.21.23 @ 22:18) >  IMPRESSION:    Mild to moderate hip arthrosis.  Nonspecific moderate hip joint effusion.  MRI can be performed for more detailed evaluation of clinicallyindicated.    < end of copied text >

## 2023-06-23 NOTE — PROGRESS NOTE ADULT - PROBLEM SELECTOR PLAN 1
Pt with increasing confusion, non-focal exam. Likely ertapenem induced  Likely due to lyrica iso CKD vs robaxin vs hospital-acquired, infection - bcx cleared, concern for ertapenem contribution.   - CTH without evidence of acute pathology  - d/c ertapenem  - Transplant ID following  - Psych consulted; Seroquel HS ordered; ECG performed QTc <500  - on rifaximin for possible but low likelihood HE  - back to baseline MS Pt with increasing confusion, non-focal exam. Likely ertapenem induced  Likely due to lyrica iso CKD vs robaxin vs hospital-acquired, infection - bcx cleared, concern for ertapenem contribution.   - CTH without evidence of acute pathology  - d/c ertapenem  - Transplant ID following  - Psych consulted;  ECG performed QTc <500  - on rifaximin for possible but low likelihood HE  - back to baseline MS  - will stop seroquel given somnolence and likely ertapenem etiology.

## 2023-06-24 NOTE — PROGRESS NOTE ADULT - PROBLEM SELECTOR PLAN 1
Pt with increasing confusion, non-focal exam. Likely ertapenem induced  Likely due to lyrica iso CKD vs robaxin vs hospital-acquired, infection - bcx cleared, concern for ertapenem contribution.   - CTH without evidence of acute pathology  - d/c ertapenem  - Transplant ID following  - Psych consulted;  ECG performed QTc <500  - on rifaximin for possible but low likelihood HE  - back to baseline MS  - will stop seroquel given somnolence and likely ertapenem etiology.

## 2023-06-24 NOTE — PROGRESS NOTE ADULT - ATTENDING COMMENTS
68 y/o M with a history notable for JEAN s/p OLT 7/2020 (on MMF + pred), multiple prior infections including MDRO UTIs, bacteremia, CMV viremia presenting with F and R sided back pain found to have L sided ureteral stone, and corynebacterium bacteremia (s/p 6/12-6/23) . C/c/b encephalopathy (resolved) s/p right hip aspiration with IR (no growth)    #Corynebacterium bacteremia s/p abx  #s/p OLT  #CKD  #CMV  #Back Pain  #Encephalopathy (resolved) 2/2 ertapenum    - bacteremia of unclear source most likely previous TMA site, MRI poor but ?OM, patient not tolerating repeat MRIs. Concern also exists for possible fluid collection in R hip, confirmed on CT. R hip fluid collection aspirated with IR 6/23 with ngtd. Completed abx with last dose of vanco 6/23.   - blood cultures positive for corynebacterium, repeat BCx NG final and NGTD, TTE no infective endocarditis   - mental status improved after dc ertapenum. CTH showing possibly old R thalamic lacunar infarct, not seen on prior CTH from June 2022  - Continue MMF + pred, holding everolimus, restart in future as per hepatology  - MRI spine with cervical and lumbar DJD, c/w pain control  - DC planning. PT re-eval for rehab vs home PT (patient refused rehab initially)    d/w HS  Dispo planning, pending PT re-eval    Mahi Almanza MD  Division of Hospital Medicine  Available on [employer] Teams .

## 2023-06-24 NOTE — PROGRESS NOTE ADULT - PROBLEM SELECTOR PLAN 4
POCUS with R hip effusion but low concern of sepsis  - CT A&P with unremarkable effusion  - Patient with improvement in ROM & pain  - IR consulted for drainage  - Drainage with 3750 cells, 90% neutrophils. NGTD

## 2023-06-24 NOTE — PROGRESS NOTE ADULT - SUBJECTIVE AND OBJECTIVE BOX
PROGRESS NOTE:   Authored by Morgan Fairchild MD   Patient is a 67y old  Male who presents with a chief complaint of back pain (23 Jun 2023 17:13)      SUBJECTIVE / OVERNIGHT EVENTS:  No acute events overnight.     ADDITIONAL REVIEW OF SYSTEMS:    MEDICATIONS  (STANDING):  acetaminophen     Tablet .. 975 milliGRAM(s) Oral every 8 hours  acetaminophen   IVPB .. 1000 milliGRAM(s) IV Intermittent once  chlorhexidine 4% Liquid 1 Application(s) Topical daily  dextrose 5%. 1000 milliLiter(s) (50 mL/Hr) IV Continuous <Continuous>  dextrose 5%. 1000 milliLiter(s) (100 mL/Hr) IV Continuous <Continuous>  dextrose 50% Injectable 25 Gram(s) IV Push once  dextrose 50% Injectable 12.5 Gram(s) IV Push once  dextrose 50% Injectable 25 Gram(s) IV Push once  dextrose Oral Gel 15 Gram(s) Oral once  folic acid 1 milliGRAM(s) Oral daily  glucagon  Injectable 1 milliGRAM(s) IntraMuscular once  heparin   Injectable 5000 Unit(s) SubCutaneous every 8 hours  insulin glargine Injectable (LANTUS) 10 Unit(s) SubCutaneous at bedtime  insulin lispro (ADMELOG) corrective regimen sliding scale   SubCutaneous at bedtime  insulin lispro (ADMELOG) corrective regimen sliding scale   SubCutaneous three times a day before meals  lidocaine   4% Patch 3 Patch Transdermal every 24 hours  melatonin 3 milliGRAM(s) Oral at bedtime  mycophenolate mofetil 500 milliGRAM(s) Oral two times a day  pantoprazole    Tablet 40 milliGRAM(s) Oral before breakfast  predniSONE   Tablet 10 milliGRAM(s) Oral daily  rifAXIMin 550 milliGRAM(s) Oral two times a day  tamsulosin 0.4 milliGRAM(s) Oral at bedtime  valGANciclovir 450 milliGRAM(s) Oral daily    MEDICATIONS  (PRN):  HYDROmorphone   Solution 1 milliGRAM(s) Oral every 6 hours PRN Moderate Pain (4 - 6)  HYDROmorphone   Tablet 2 milliGRAM(s) Oral every 6 hours PRN Severe Pain (7 - 10)      CAPILLARY BLOOD GLUCOSE      POCT Blood Glucose.: 247 mg/dL (23 Jun 2023 22:01)  POCT Blood Glucose.: 182 mg/dL (23 Jun 2023 17:46)  POCT Blood Glucose.: 280 mg/dL (23 Jun 2023 13:36)  POCT Blood Glucose.: 286 mg/dL (23 Jun 2023 12:18)  POCT Blood Glucose.: 152 mg/dL (23 Jun 2023 09:22)  POCT Blood Glucose.: 156 mg/dL (23 Jun 2023 08:14)    I&O's Summary    23 Jun 2023 07:01  -  24 Jun 2023 07:00  --------------------------------------------------------  IN: 0 mL / OUT: 250 mL / NET: -250 mL        PHYSICAL EXAM:  Vital Signs Last 24 Hrs  T(C): 36.5 (24 Jun 2023 04:37), Max: 37.1 (23 Jun 2023 22:19)  T(F): 97.7 (24 Jun 2023 04:37), Max: 98.7 (23 Jun 2023 22:19)  HR: 67 (24 Jun 2023 04:37) (67 - 89)  BP: 127/50 (24 Jun 2023 04:37) (122/50 - 134/54)  BP(mean): --  RR: 18 (24 Jun 2023 04:37) (18 - 18)  SpO2: 96% (24 Jun 2023 04:37) (96% - 98%)    Parameters below as of 24 Jun 2023 04:37  Patient On (Oxygen Delivery Method): room air        GENERAL: NAD, lying in bed comfortably.  HEAD:  Atraumatic, normocephalic.  NECK: Supple.   CHEST/LUNG: CTAB. No rales, rhonchi, wheezing, or rubs. Unlabored respirations.  HEART: RRR, no M/R/G, normal S1/S2.  ABDOMEN: Soft, nontender, nondistended. Normoactive bowel sounds.  EXTREMITIES:  2+ peripheral pulses b/l. No clubbing, cyanosis, or edema.  MSK: No gross deformities noted.   NEURO:  AAOx3, no focal deficits.   SKIN: No rashes or lesions.  PSYCH: Normal mood, affect.    LABS:                        10.2   6.91  )-----------( 300      ( 24 Jun 2023 06:34 )             32.9     06-23    139  |  105  |  59<H>  ----------------------------<  166<H>  4.3   |  20<L>  |  2.43<H>    Ca    9.7      23 Jun 2023 07:13  Phos  3.6     06-23  Mg     2.5     06-23    TPro  6.9  /  Alb  3.5  /  TBili  0.2  /  DBili  x   /  AST  7<L>  /  ALT  9<L>  /  AlkPhos  109  06-23          Urinalysis Basic - ( 23 Jun 2023 07:13 )    Color: x / Appearance: x / SG: x / pH: x  Gluc: 166 mg/dL / Ketone: x  / Bili: x / Urobili: x   Blood: x / Protein: x / Nitrite: x   Leuk Esterase: x / RBC: x / WBC x   Sq Epi: x / Non Sq Epi: x / Bacteria: x        Culture - Joint Fluid (collected 22 Jun 2023 17:56)  Source: Hip LEFT HIP ASPIRATION  Gram Stain (22 Jun 2023 23:28):    Moderate polymorphonuclear leukocytes per low power field    No organisms seen per oil power field  Preliminary Report (23 Jun 2023 21:32):    No growth        RADIOLOGY & ADDITIONAL TESTS:  Lab Results Reviewed   Imaging Reviewed  Electrocardiogram Reviewed

## 2023-06-25 NOTE — PROGRESS NOTE ADULT - PROBLEM SELECTOR PLAN 6
Department of Anesthesiology  Preprocedure Note       Name:  Carlos Burris   Age:  68 y.o.  :  1945                                          MRN:  [id number]         Date:  2/15/2023      Surgeon: Lonna Frankel):  Katie Sadler DO    Procedure: Procedure(s):  DIRECT LARYNGOSCOPY WITH BIOPSY RIGHT TRUE VOCAL CORD LESION ESOPHAGOSCOPY, BRONCHOSCOPY MICROSUSPENSION WITH BIPOSY OF RIGHT TRUE VOCAL CORD WITH FROZEN SECTION        (PATHOLOGY NOTIFIED)    Medications prior to admission:   Prior to Admission medications    Medication Sig Start Date End Date Taking?  Authorizing Provider   umeclidinium-vilanterol (ANORO ELLIPTA) 62.5-25 MCG/ACT inhaler Inhale 1 puff into the lungs daily   Yes Historical Provider, MD   fluticasone (FLONASE) 50 MCG/ACT nasal spray 2 sprays by Nasal route daily 2 sprays in each nostril daily 23   Katie Sadler DO   triamterene-hydroCHLOROthiazide (MAXZIDE-25) 37.5-25 MG per tablet  23   Historical Provider, MD   torsemide (DEMADEX) 20 MG tablet  23   Historical Provider, MD   promethazine (PHENERGAN) 25 MG tablet  23   Historical Provider, MD   MOVANTIK 25 MG TABS tablet  23   Historical Provider, MD   escitalopram (LEXAPRO) 20 MG tablet Take 20 mg by mouth Daily with lunch    Historical Provider, MD   metoprolol tartrate (LOPRESSOR) 25 MG tablet Take 25 mg by mouth 2 times daily    Historical Provider, MD   nitroGLYCERIN (NITROSTAT) 0.4 MG SL tablet Place 1 tablet under the tongue every 5 minutes as needed for Chest pain 22   Bassam Monet DO   ondansetron (ZOFRAN) 4 MG tablet Take 1 tablet by mouth every 8 hours as needed for Nausea or Vomiting 22   Bless[place] MD   dexlansoprazole (DEXILANT) 60 MG CPDR delayed release capsule Take 60 mg by mouth at bedtime 21   Historical Provider, MD   Multiple Vitamins-Minerals (MULTIVITAMIN ADULT) CHEW Take 1 each Trixie Burr MD at [facility] 5/17/2022    CYSTOSCOPY BOTOX INJECTION 200 UNITS performed by Trixie Burr MD at [address] CATH LAB Home medications: Lantus 14 qhs and Admelog 8u TID    - c/w ISS and Lantus 10 qhs

## 2023-06-25 NOTE — PROGRESS NOTE ADULT - PROBLEM SELECTOR PLAN 9
- Diet: DASH/TLC  - DVT: heparin subQ  Dispo: PT recs [facility], pt wants home d/c - Diet: DASH/TLC  - DVT: heparin subQ  Dispo: Pt wants to go to rehab

## 2023-06-25 NOTE — PROGRESS NOTE ADULT - PROBLEM SELECTOR PLAN 1
Pt with increasing confusion, non-focal exam. Likely ertapenem induced; mental status now improving  Likely due to lyrica iso CKD vs robaxin vs hospital-acquired, infection - bcx cleared, concern for ertapenem contribution.   - CTH without evidence of acute pathology  - d/c ertapenem  - Transplant ID following  - Psych consulted;  ECG performed QTc <500  - on rifaximin for possible but low likelihood HE  - back to baseline MS  - will stop seroquel given somnolence and likely ertapenem etiology.

## 2023-06-25 NOTE — PROGRESS NOTE ADULT - ATTENDING COMMENTS
66 y/o M with a history notable for JEAN s/p OLT 7/2020 (on MMF + pred), multiple prior infections including MDRO UTIs, bacteremia, CMV viremia presenting with F and R sided back pain found to have L sided ureteral stone, and corynebacterium bacteremia (s/p abx 6/12-6/23) . C/c/b encephalopathy (resolved) s/p right hip aspiration with IR (no growth). Medically optimized for dispo to [facility]    #Corynebacterium bacteremia s/p abx  #CKD  #CMV  #Back Pain  #Encephalopathy 2/2 ertapenum    - bacteremia of unclear source or contanminent vs right foot TMA site. Empirically treated and completed abx (last dose vanco 6/23). Righ hip aspirate culture ngtd, unlikely septic arthritis. Patient unable to tolerate repeat MRI for right foot to assess for OM  - mental status improved after dc ertapenum  - Continue MMF + pred, holding everolimus, restart in future as per hepatology  - MRI spine with cervical and lumbar DJD, c/w pain control  - PT re-eval- rehab    d/w HS  Dispo to [facility]za MD  Division of Hospital Medicine  Available on [employer] Teams

## 2023-06-25 NOTE — PROGRESS NOTE ADULT - SUBJECTIVE AND OBJECTIVE BOX
INCOMPLETE    INTERVAL:  SUBJECTIVE: Patient examined bedside this AM. Pt stated he had no concerns. He states he wants to get a walker so he can walk. He stated he would like to go to rehab.    OBJECTIVE:  ICU Vital Signs Last 24 Hrs  T(C): 36.8 (25 Jun 2023 12:57), Max: 36.8 (25 Jun 2023 12:57)  T(F): 98.2 (25 Jun 2023 12:57), Max: 98.2 (25 Jun 2023 12:57)  HR: 70 (25 Jun 2023 12:57) (70 - 78)  BP: 120/49 (25 Jun 2023 12:57) (112/46 - 122/50)  BP(mean): --  ABP: --  ABP(mean): --  RR: 18 (25 Jun 2023 12:57) (18 - 18)  SpO2: 97% (25 Jun 2023 12:57) (97% - 99%)    O2 Parameters below as of 25 Jun 2023 12:57  Patient On (Oxygen Delivery Method): room air              06-24 @ 07:01 - 06-25 @ 07:00  --------------------------------------------------------  IN: 240 mL / OUT: 700 mL / NET: -460 mL    06-25 @ 07:01 - 06-25 @ 16:43  --------------------------------------------------------  IN: 0 mL / OUT: 350 mL / NET: -350 mL      CAPILLARY BLOOD GLUCOSE      POCT Blood Glucose.: 237 mg/dL (25 Jun 2023 12:55)      PHYSICAL EXAM:  General: Well-groomed, NAD, laying in bed  Neck:  symmetric  Respiratory: Clear to ascultation bilaterally, no crackles/rales, no Resp distress; no accessory muscle use  Cardiovascular:  RRR, no murmurs/rubs/gallops  Abdomen: Soft, NT, ND  Psychiatry: AOx3, appropriate insight/judgement, appropriate affect, recent/remote memory intact    PRN Meds:  HYDROmorphone   Solution 1 milliGRAM(s) Oral every 6 hours PRN  HYDROmorphone   Tablet 2 milliGRAM(s) Oral every 6 hours PRN      LABS:                        9.9    7.86  )-----------( 294      ( 25 Jun 2023 07:21 )             31.7     Hgb Trend: 9.9<--, 10.2<--, 10.0<--, 9.6<--, 9.5<--  06-25    136  |  106  |  60<H>  ----------------------------<  215<H>  4.7   |  19<L>  |  2.19<H>    Ca    9.5      25 Jun 2023 07:21  Phos  3.2     06-25  Mg     2.1     06-25    TPro  6.7  /  Alb  3.2<L>  /  TBili  0.2  /  DBili  x   /  AST  6<L>  /  ALT  8<L>  /  AlkPhos  110  06-25    Creatinine Trend: 2.19<--, 2.43<--, 2.43<--, 2.68<--, 2.75<--, 2.30<--    Urinalysis Basic - ( 25 Jun 2023 07:21 )    Color: x / Appearance: x / SG: x / pH: x  Gluc: 215 mg/dL / Ketone: x  / Bili: x / Urobili: x   Blood: x / Protein: x / Nitrite: x   Leuk Esterase: x / RBC: x / WBC x   Sq Epi: x / Non Sq Epi: x / Bacteria: x            MICROBIOLOGY:     Culture - Joint Fluid (collected 22 Jun 2023 17:56)  Source: Hip LEFT HIP ASPIRATION  Gram Stain (22 Jun 2023 23:28):    Moderate polymorphonuclear leukocytes per low power field    No organisms seen per oil power field  Preliminary Report (23 Jun 2023 21:32):    No growth        RADIOLOGY:  [ ] Reviewed and interpreted by me    EKG:

## 2023-06-26 NOTE — PROGRESS NOTE ADULT - ATTENDING COMMENTS
68 y/o M with a history notable for JEAN s/p OLT 7/2020 (on MMF + pred), multiple prior infections including MDRO UTIs, bacteremia, CMV viremia presenting with F and R sided back pain found to have L sided ureteral stone, and corynebacterium bacteremia (s/p abx 6/12-6/23) . C/c/b encephalopathy (resolved) s/p right hip aspiration with IR (no growth). Medically optimized for dispo to [facility]    #Corynebacterium bacteremia s/p abx  #CKD  #CMV  #Back Pain  #Encephalopathy 2/2 ertapenem    - bacteremia of unclear source or contaminant vs right foot TMA site. Empirically treated and completed abx (last dose vanco 6/23). Right hip aspirate culture ngtd, unlikely septic arthritis. Patient unable to tolerate repeat MRI for right foot to assess for OM  - mental status improved after dc ertapenem  - Continue MMF + pred, holding everolimus, restart in future as per hepatology  - MRI spine with cervical and lumbar DJD, c/w pain control  - Pending d/c to [facility]

## 2023-06-26 NOTE — PROGRESS NOTE ADULT - ASSESSMENT
67 M s/p 1/25 R foot TMA w/ LEAH  - Pt seen and evaluated  - Afebrile, no leukocytosis   - Right foot TMA healed, sutures intact, graft intact , no hematoma, no seroma,  minimal drainage, no acute SOI  - 1/25 clean bone margin pathology negative for osteomyelitis, 1/25 clean bone culture no growth  - R foot MRI limited but there is concern for possible 5th metatarsal base, 4th met base, lat cun OM, no abscess  - No clinical signs of infection bilaterally, unclear if right foot is source given no probe to bone, healed surgical site, and negative clean bone margins from intervention  - Case discussed with ID and radiology   - podiatry stable for discharge  - seen w/ attending

## 2023-06-26 NOTE — CONSULT NOTE ADULT - CONSULT REQUESTED DATE/TIME
12-Jun-2023 15:00
12-Jun-2023 00:35
21-Jun-2023 10:46
12-Jun-2023
14-Jun-2023 16:39
16-Jun-2023 15:59
26-Jun-2023 21:45

## 2023-06-26 NOTE — CONSULT NOTE ADULT - SUBJECTIVE AND OBJECTIVE BOX
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 Pt states this pain began ~2 days ago, Denies recent trauma, travel, infections, or sick contacts. He denies N/F/V/C, CP, SOB, diarrhea, constipation, melena/ hematochezia, dysuria, and hematuria. State the R back/ flank pain moves around to the right side of the abdomen. The hip pain does not move to groin or down the extremity. States both these pains worsen with movement. Pt also reports L neck pain moving to shoulder; states it has been difficult to rotate head. Reports fatigue few weeks prior to onset of symptoms. Due to pain, he has addison unable to get out of bed. States pain mildly improves with Tylenol. Pt has not had any recent medication changes.     In the ED- Tmax 100,2, SBP 130s, HR 70- 80  Labs- WBC 7.64, Hgb 11.2, Plt 254, BUN 44, Cr 1.82, VBG- 7.35/37/33/20 lactate 1.0, UA + LE +  [id number]  Imaging  -CTAP 6 x 3 mm stone seen within the left distal ureter with L hydronephrosis; asymm urinary bladder thickening; L>R perinephric stranding; possible distal esophagitis; trace pelvic fluid with right pelvic surgical clip  -ED POCUS: right hip effusion  Meds- CTX (11pm), Vanc (1am), Fentanly 25, NS bolus 250 (12 Jun 2023 05:38)    Dermatology consulted for suture removal over patient's scalp s/p Mohs for SCC by outside dermatologist 5/29.    PAST MEDICAL & SURGICAL HISTORY:  Diabetes      Hypercholesteremia      Neuropathy      Hypertension      Renal stones  25 years ago      Fatty liver disease, nonalcoholic      Obesity      Hepatic encephalopathy      GERD with esophagitis  Gastritis & Non Bleeding Ulcers      GIB (gastrointestinal bleeding)      S/P cholecystectomy          REVIEW OF SYSTEMS      General: no fevers/chills, no lethary	    Skin/Breast: see HPI  	  Ophthalmologic: no eye pain or change in vision  	  ENMT: no dysphagia or change in hearing    Respiratory and Thorax: no SOB or cough  	  Cardiovascular: no palpitations or chest pain    Gastrointestinal: no abdomenal pain or blood in stool     Genitourinary: no dysuria or frequency    Musculoskeletal: no joint pains or weakness	    Neurological:no weakness, numbness , or tingling    MEDICATIONS  (STANDING):  acetaminophen     Tablet .. 975 milliGRAM(s) Oral every 8 hours  acetaminophen   IVPB .. 1000 milliGRAM(s) IV Intermittent once  chlorhexidine 4% Liquid 1 Application(s) Topical daily  dextrose 5%. 1000 milliLiter(s) IV Continuous <Continuous>  dextrose 5%. 1000 milliLiter(s) IV Continuous <Continuous>  dextrose 50% Injectable 25 Gram(s) IV Push once  dextrose 50% Injectable 12.5 Gram(s) IV Push once  dextrose 50% Injectable 25 Gram(s) IV Push once  dextrose Oral Gel 15 Gram(s) Oral once  folic acid 1 milliGRAM(s) Oral daily  glucagon  Injectable 1 milliGRAM(s) IntraMuscular once  heparin   Injectable 5000 Unit(s) SubCutaneous every 8 hours  insulin glargine Injectable (LANTUS) 10 Unit(s) SubCutaneous at bedtime  insulin lispro (ADMELOG) corrective regimen sliding scale   SubCutaneous three times a day before meals  insulin lispro (ADMELOG) corrective regimen sliding scale   SubCutaneous at bedtime  lidocaine   4% Patch 3 Patch Transdermal every 24 hours  melatonin 3 milliGRAM(s) Oral at bedtime  mycophenolate mofetil 500 milliGRAM(s) Oral two times a day  pantoprazole    Tablet 40 milliGRAM(s) Oral before breakfast  predniSONE   Tablet 10 milliGRAM(s) Oral daily  rifAXIMin 550 milliGRAM(s) Oral two times a day  tamsulosin 0.4 milliGRAM(s) Oral at bedtime  valGANciclovir 450 milliGRAM(s) Oral daily    ALLERGIES: codeine      Social History:  No tobacco use  No longer drinks alcohol  Ambulates with RW  Lives with 3 kids (Primary point of contact- Jas)    FAMILY HISTORY:  Family history of stomach cancer    Family history of hypertension    Family history of type 2 diabetes mellitus          VITAL SIGNS LAST 24 HOURS:  T(F): 97.8 (06-26 @ 12:15), Max: 97.8 (06-26 @ 12:15)  HR: 65 (06-26 @ 12:15) (65 - 69)  BP: 130/57 (06-26 @ 12:15) (125/47 - 130/57)  RR: 18 (06-26 @ 12:15) (18 - 18)    ___________________________________  Physical Exam:     The patient was alert and oriented X 3, and in no  apparent distress.  OP showed no ulcerations  There was no visible lymphadenopathy.  Conjunctiva were non injected  There was no clubbing or edema of extremities.  The scalp, hair, face, eyebrows, lips, OP, neck, chest, back,   extremities X 4, nails were examined.  There was no hyperhidrosis or bromhidrosis.    Of note on skin exam:     Well-healing scar with sutures in place over L frontal scalp   ____________________________________    LABS:                        9.7    7.02  )-----------( 275      ( 26 Jun 2023 07:07 )             30.2     06-26    137  |  105  |  60<H>  ----------------------------<  249<H>  4.7   |  20<L>  |  2.28<H>    Ca    9.5      26 Jun 2023 07:01  Phos  3.1     06-26  Mg     2.1     06-26    TPro  6.7  /  Alb  3.2<L>  /  TBili  0.2  /  DBili  x   /  AST  6<L>  /  ALT  8<L>  /  AlkPhos  110  06-25      Urinalysis Basic - ( 26 Jun 2023 07:01 )    Color: x / Appearance: x / SG: x / pH: x  Gluc: 249 mg/dL / Ketone: x  / Bili: x / Urobili: x   Blood: x / Protein: x / Nitrite: x   Leuk Esterase: x / RBC: x / WBC x   Sq Epi: x / Non Sq Epi: x / Bacteria: x

## 2023-06-26 NOTE — CONSULT NOTE ADULT - ATTENDING COMMENTS
Patient with report of SCC s/p Mohs surgery on 5/29. Was not able to f/u for suture removal due to this hospitalization. Patient is overdue for suture removal and sutures are beginning to be enveloped by skin. Sutures removed today without complication. Patient to follow up with outside dermatologist after discharge from Rehab for re-evaluation of the wound.    Franklin Millard MD, PharmD, MPH  Co-Director, Inpatient Dermatology Consultation Service, [facility] [place]

## 2023-06-26 NOTE — CONSULT NOTE ADULT - ASSESSMENT
#Well-healing scar with sutures in place on L frontal scalp  S/p Mohs 5/29 for SCC by outside dermatologist  - sutures removed without complication    Thank you for allowing us to participate in the care of this pt. Dermatology to sign off at this time. Please notify us and re-consult as needed for new or concerning changes to skin exam.     Patient was seen at bedside and staffed in person with the dermatology attending Dr. Millard  Recommendations were communicated with the primary team.  Please page [phone] for further related questions.    Karon Johnson MD  Resident Physician, PGY2  [facility]  Pager: [phone]  Office: [phone].

## 2023-06-26 NOTE — PROGRESS NOTE ADULT - PROBLEM SELECTOR PLAN 9
- Diet: DASH/TLC  - DVT: heparin subQ  Dispo: Pt wants to go to rehab - pt requested sutures from Mohs procedure on May 24th taken out by dermatology before he goes to rehab  - consulted dermatology

## 2023-06-26 NOTE — PROGRESS NOTE ADULT - SUBJECTIVE AND OBJECTIVE BOX
INCOMPLETE    INTERVAL:  SUBJECTIVE: Patient examined bedside this AM. Pt states he is doing well. Requested to have sutures from Mohs procedure on May 24th taken out by dermatology before he goes to rehab. No other concerns.    OBJECTIVE:  ICU Vital Signs Last 24 Hrs  T(C): 36.6 (26 Jun 2023 12:15), Max: 37 (25 Jun 2023 20:21)  T(F): 97.8 (26 Jun 2023 12:15), Max: 98.6 (25 Jun 2023 20:21)  HR: 65 (26 Jun 2023 12:15) (65 - 77)  BP: 130/57 (26 Jun 2023 12:15) (118/50 - 130/57)  BP(mean): --  ABP: --  ABP(mean): --  RR: 18 (26 Jun 2023 12:15) (18 - 18)  SpO2: 96% (26 Jun 2023 12:15) (96% - 99%)    O2 Parameters below as of 26 Jun 2023 12:15  Patient On (Oxygen Delivery Method): room air              06-25 @ 07:01  -  06-26 @ 07:00  --------------------------------------------------------  IN: 240 mL / OUT: 1550 mL / NET: -1310 mL    06-26 @ 07:01  -  06-26 @ 19:01  --------------------------------------------------------  IN: 0 mL / OUT: 1100 mL / NET: -1100 mL      CAPILLARY BLOOD GLUCOSE      POCT Blood Glucose.: 344 mg/dL (26 Jun 2023 17:53)      PHYSICAL EXAM:  General: Well-groomed, NAD, laying in bed, on RA  Neck:  symmetric  Respiratory: Clear to ascultation bilaterally, no crackles/rales, no Resp distress; no accessory muscle use  Cardiovascular:  RRR, no murmurs/rubs/gallops  Abdomen: Soft, NT, ND  Psychiatry: AOx3, appropriate insight/judgement, appropriate affect, recent/remote memory intact    PRN Meds:  HYDROmorphone   Solution 1 milliGRAM(s) Oral every 6 hours PRN  HYDROmorphone   Tablet 2 milliGRAM(s) Oral every 6 hours PRN      LABS:                        9.7    7.02  )-----------( 275      ( 26 Jun 2023 07:07 )             30.2     Hgb Trend: 9.7<--, 9.9<--, 10.2<--, 10.0<--, 9.6<--  06-26    137  |  105  |  60<H>  ----------------------------<  249<H>  4.7   |  20<L>  |  2.28<H>    Ca    9.5      26 Jun 2023 07:01  Phos  3.1     06-26  Mg     2.1     06-26    TPro  6.7  /  Alb  3.2<L>  /  TBili  0.2  /  DBili  x   /  AST  6<L>  /  ALT  8<L>  /  AlkPhos  110  06-25    Creatinine Trend: 2.28<--, 2.19<--, 2.43<--, 2.43<--, 2.68<--, 2.75<--    Urinalysis Basic - ( 26 Jun 2023 07:01 )    Color: x / Appearance: x / SG: x / pH: x  Gluc: 249 mg/dL / Ketone: x  / Bili: x / Urobili: x   Blood: x / Protein: x / Nitrite: x   Leuk Esterase: x / RBC: x / WBC x   Sq Epi: x / Non Sq Epi: x / Bacteria: x            MICROBIOLOGY:       RADIOLOGY:  [ ] Reviewed and interpreted by me    EKG:

## 2023-06-27 NOTE — PROGRESS NOTE ADULT - PROBLEM SELECTOR PLAN 9
- pt requested sutures from Mohs procedure on May 24th taken out by dermatology before he goes to rehab  - consulted dermatology - pt requested sutures from Mohs procedure be taken out by dermatology before he goes to rehab  - dermatology removed sutures on 6/26/23

## 2023-06-27 NOTE — PROGRESS NOTE ADULT - SUBJECTIVE AND OBJECTIVE BOX
INTERVAL:  SUBJECTIVE: Patient examined bedside this AM. Pt stated he was doing wells. Denies any concerns or any acute events overnight.    OBJECTIVE:  ICU Vital Signs Last 24 Hrs  T(C): 36.7 (27 Jun 2023 11:42), Max: 36.8 (26 Jun 2023 22:06)  T(F): 98.1 (27 Jun 2023 11:42), Max: 98.2 (26 Jun 2023 22:06)  HR: 69 (27 Jun 2023 11:42) (69 - 73)  BP: 126/45 (27 Jun 2023 11:42) (124/49 - 132/50)  BP(mean): --  ABP: --  ABP(mean): --  RR: 18 (27 Jun 2023 11:42) (18 - 18)  SpO2: 96% (27 Jun 2023 11:42) (96% - 97%)    O2 Parameters below as of 27 Jun 2023 11:42  Patient On (Oxygen Delivery Method): room air              06-26 @ 07:01  -  06-27 @ 07:00  --------------------------------------------------------  IN: 0 mL / OUT: 1500 mL / NET: -1500 mL      CAPILLARY BLOOD GLUCOSE      POCT Blood Glucose.: 285 mg/dL (27 Jun 2023 12:25)      PHYSICAL EXAM:  General: Well-groomed, NAD, laying in bed, on RA  Neck:  symmetric  Respiratory: Clear to ascultation bilaterally, no crackles/rales, no Resp distress; no accessory muscle use  Cardiovascular:  RRR, no murmurs/rubs/gallops  Abdomen: Soft, NT, ND  Psychiatry: AOx3    PRN Meds:  HYDROmorphone   Solution 1 milliGRAM(s) Oral every 6 hours PRN  HYDROmorphone   Tablet 2 milliGRAM(s) Oral every 6 hours PRN      LABS:                        9.7    7.02  )-----------( 275      ( 26 Jun 2023 07:07 )             30.2     Hgb Trend: 9.7<--, 9.9<--, 10.2<--, 10.0<--, 9.6<--  06-26    137  |  105  |  60<H>  ----------------------------<  249<H>  4.7   |  20<L>  |  2.28<H>    Ca    9.5      26 Jun 2023 07:01  Phos  3.1     06-26  Mg     2.1     06-26      Creatinine Trend: 2.28<--, 2.19<--, 2.43<--, 2.43<--, 2.68<--, 2.75<--    Urinalysis Basic - ( 26 Jun 2023 07:01 )    Color: x / Appearance: x / SG: x / pH: x  Gluc: 249 mg/dL / Ketone: x  / Bili: x / Urobili: x   Blood: x / Protein: x / Nitrite: x   Leuk Esterase: x / RBC: x / WBC x   Sq Epi: x / Non Sq Epi: x / Bacteria: x            MICROBIOLOGY:       RADIOLOGY:  [ ] Reviewed and interpreted by me    EKG:

## 2023-06-27 NOTE — PROGRESS NOTE ADULT - ATTENDING COMMENTS
68 y/o M with a history notable for JEAN s/p OLT 7/2020 (on MMF + pred), multiple prior infections including MDRO UTIs, bacteremia, CMV viremia presenting with F and R sided back pain found to have L sided ureteral stone, and corynebacterium bacteremia (s/p abx 6/12-6/23) . C/c/b encephalopathy (resolved) s/p right hip aspiration with IR (no growth). Medically optimized for dispo to [facility]    #Corynebacterium bacteremia s/p abx  #CKD  #CMV  #Back Pain  #Encephalopathy 2/2 ertapenem    - bacteremia of unclear source or contaminant vs right foot TMA site. Empirically treated and completed abx (last dose vanco 6/23). Right hip aspirate culture ngtd, unlikely septic arthritis. Patient unable to tolerate repeat MRI for right foot to assess for OM  - mental status improved after dc ertapenem  - Continue MMF + pred, holding everolimus, restart in future as per hepatology  - MRI spine with cervical and lumbar DJD, c/w pain control  - FS 200s-300s, Insulin adjusted  - Pending d/c to [facility]

## 2023-06-27 NOTE — PROGRESS NOTE ADULT - PROBLEM SELECTOR PLAN 6
Home medications: Lantus 14 qhs and Admelog 8u TID    - c/w ISS and Lantus 10 qhs Home medications: Lantus 14 qhs and Admelog 8u TID    - increased Lantus to 14U and Lispro 2U Home medications: Lantus 14 qhs and Admelog 8u TID  - BG elevated in the 200-300s  - increased insulin regimen to Lantus 14U and Admelog 2U

## 2023-06-27 NOTE — PROGRESS NOTE ADULT - PROBLEM SELECTOR PLAN 2
Presents with R flank pain radiating to the right side of the abdomen; UA + LE and +WBC  CTAP - 6 x 3 mm stone seen within the left distal ureter with L hydronephrosis; asymm urinary bladder thickening; L>R perinephric stranding;  - No intervention per urology  - medical management for stone  - MRI spine without evidence of OM or discitis.   - Pain management per Chronic pain service.  - MR foot repeat pending, relatively incomplete showing possible OM of 4th and 5th metatarsal/cuneiform.  - per podiatry no surgical intervention for rt foot Presents with R flank pain radiating to the right side of the abdomen; UA + LE and +WBC  CTAP - 6 x 3 mm stone seen within the left distal ureter with L hydronephrosis; asymm urinary bladder thickening; L>R perinephric stranding;  - No intervention per urology  - medical management for stone  - MRI spine without evidence of OM or discitis.   - Pain management per Chronic pain service.  - MR foot repeat pending, relatively incomplete showing possible OM of 4th and 5th metatarsal/cuneiform.  - per podiatry no surgical intervention for rt foot, cleared by podiatry for discharge

## 2023-06-28 NOTE — PROGRESS NOTE ADULT - SUBJECTIVE AND OBJECTIVE BOX
Patient is a 67y old  Male who presents with a chief complaint of back pain (28 Jun 2023 12:02)      SUBJECTIVE / OVERNIGHT EVENTS: Pt stated he is feeling well. Pt reports no acute events overnight.    MEDICATIONS  (STANDING):  acetaminophen     Tablet .. 975 milliGRAM(s) Oral every 8 hours  chlorhexidine 4% Liquid 1 Application(s) Topical daily  dextrose 5%. 1000 milliLiter(s) (50 mL/Hr) IV Continuous <Continuous>  dextrose 5%. 1000 milliLiter(s) (100 mL/Hr) IV Continuous <Continuous>  dextrose 50% Injectable 25 Gram(s) IV Push once  dextrose 50% Injectable 12.5 Gram(s) IV Push once  dextrose 50% Injectable 25 Gram(s) IV Push once  dextrose Oral Gel 15 Gram(s) Oral once  folic acid 1 milliGRAM(s) Oral daily  glucagon  Injectable 1 milliGRAM(s) IntraMuscular once  heparin   Injectable 5000 Unit(s) SubCutaneous every 8 hours  insulin glargine Injectable (LANTUS) 14 Unit(s) SubCutaneous at bedtime  insulin lispro (ADMELOG) corrective regimen sliding scale   SubCutaneous at bedtime  insulin lispro (ADMELOG) corrective regimen sliding scale   SubCutaneous three times a day before meals  insulin lispro Injectable (ADMELOG) 2 Unit(s) SubCutaneous three times a day before meals  lidocaine   4% Patch 3 Patch Transdermal every 24 hours  melatonin 3 milliGRAM(s) Oral at bedtime  mycophenolate mofetil 500 milliGRAM(s) Oral two times a day  pantoprazole    Tablet 40 milliGRAM(s) Oral before breakfast  predniSONE   Tablet 10 milliGRAM(s) Oral daily  rifAXIMin 550 milliGRAM(s) Oral two times a day  tamsulosin 0.4 milliGRAM(s) Oral at bedtime  valGANciclovir 450 milliGRAM(s) Oral daily    MEDICATIONS  (PRN):      CAPILLARY BLOOD GLUCOSE      POCT Blood Glucose.: 389 mg/dL (28 Jun 2023 12:23)  POCT Blood Glucose.: 190 mg/dL (28 Jun 2023 08:44)  POCT Blood Glucose.: 191 mg/dL (27 Jun 2023 22:30)  POCT Blood Glucose.: 283 mg/dL (27 Jun 2023 17:29)    I&O's Summary    27 Jun 2023 07:01  -  28 Jun 2023 07:00  --------------------------------------------------------  IN: 0 mL / OUT: 1100 mL / NET: -1100 mL        Vital Signs Last 24 Hrs  T(C): 36.8 (28 Jun 2023 12:24), Max: 36.9 (27 Jun 2023 21:24)  T(F): 98.2 (28 Jun 2023 12:24), Max: 98.5 (27 Jun 2023 21:24)  HR: 71 (28 Jun 2023 12:24) (71 - 79)  BP: 118/47 (28 Jun 2023 12:24) (110/45 - 136/52)  BP(mean): --  RR: 18 (28 Jun 2023 12:24) (18 - 18)  SpO2: 98% (28 Jun 2023 12:24) (93% - 98%)    Parameters below as of 28 Jun 2023 12:24  Patient On (Oxygen Delivery Method): room air        PHYSICAL EXAM:  GENERAL: NAD, well-developed, well-nourished  HEAD: Atraumatic, Normocephalic  EYES: EOMI, PERRLA, conjunctiva and sclera clear  NECK: Supple, No JVD  CHEST/LUNG: Clear to auscultation bilaterally; No wheezes or crackles  HEART: Normal S1/S2; Regular rate and rhythm; No murmurs, rubs, or gallops  ABDOMEN: Soft, Nontender, Nondistended; Bowel sounds present  EXTREMITIES: 2+ Peripheral Pulses; No clubbing, cyanosis, or edema  PSYCH: A&Ox3  NEUROLOGY: no focal neurologic deficit  SKIN: No rashes or lesions    LABS:                     RADIOLOGY & ADDITIONAL TESTS:    Imaging Personally Reviewed:    Consultant(s) Notes Reviewed:      Care Discussed with Consultants/Other Providers:   Patient is a 67y old  Male who presents with a chief complaint of back pain (28 Jun 2023 12:02)      SUBJECTIVE / OVERNIGHT EVENTS: Pt stated he is feeling well. Pt reports no acute events overnight.    MEDICATIONS  (STANDING):  acetaminophen     Tablet .. 975 milliGRAM(s) Oral every 8 hours  chlorhexidine 4% Liquid 1 Application(s) Topical daily  dextrose 5%. 1000 milliLiter(s) (50 mL/Hr) IV Continuous <Continuous>  dextrose 5%. 1000 milliLiter(s) (100 mL/Hr) IV Continuous <Continuous>  dextrose 50% Injectable 25 Gram(s) IV Push once  dextrose 50% Injectable 12.5 Gram(s) IV Push once  dextrose 50% Injectable 25 Gram(s) IV Push once  dextrose Oral Gel 15 Gram(s) Oral once  folic acid 1 milliGRAM(s) Oral daily  glucagon  Injectable 1 milliGRAM(s) IntraMuscular once  heparin   Injectable 5000 Unit(s) SubCutaneous every 8 hours  insulin glargine Injectable (LANTUS) 14 Unit(s) SubCutaneous at bedtime  insulin lispro (ADMELOG) corrective regimen sliding scale   SubCutaneous at bedtime  insulin lispro (ADMELOG) corrective regimen sliding scale   SubCutaneous three times a day before meals  insulin lispro Injectable (ADMELOG) 2 Unit(s) SubCutaneous three times a day before meals  lidocaine   4% Patch 3 Patch Transdermal every 24 hours  melatonin 3 milliGRAM(s) Oral at bedtime  mycophenolate mofetil 500 milliGRAM(s) Oral two times a day  pantoprazole    Tablet 40 milliGRAM(s) Oral before breakfast  predniSONE   Tablet 10 milliGRAM(s) Oral daily  rifAXIMin 550 milliGRAM(s) Oral two times a day  tamsulosin 0.4 milliGRAM(s) Oral at bedtime  valGANciclovir 450 milliGRAM(s) Oral daily    MEDICATIONS  (PRN):      CAPILLARY BLOOD GLUCOSE      POCT Blood Glucose.: 389 mg/dL (28 Jun 2023 12:23)  POCT Blood Glucose.: 190 mg/dL (28 Jun 2023 08:44)  POCT Blood Glucose.: 191 mg/dL (27 Jun 2023 22:30)  POCT Blood Glucose.: 283 mg/dL (27 Jun 2023 17:29)    I&O's Summary    27 Jun 2023 07:01  -  28 Jun 2023 07:00  --------------------------------------------------------  IN: 0 mL / OUT: 1100 mL / NET: -1100 mL        Vital Signs Last 24 Hrs  T(C): 36.8 (28 Jun 2023 12:24), Max: 36.9 (27 Jun 2023 21:24)  T(F): 98.2 (28 Jun 2023 12:24), Max: 98.5 (27 Jun 2023 21:24)  HR: 71 (28 Jun 2023 12:24) (71 - 79)  BP: 118/47 (28 Jun 2023 12:24) (110/45 - 136/52)  BP(mean): --  RR: 18 (28 Jun 2023 12:24) (18 - 18)  SpO2: 98% (28 Jun 2023 12:24) (93% - 98%)    Parameters below as of 28 Jun 2023 12:24  Patient On (Oxygen Delivery Method): room air        PHYSICAL EXAM:  GENERAL: NAD, well-developed, well-nourished  HEAD: Atraumatic, Normocephalic  NECK: Supple  CHEST/LUNG: Clear to auscultation bilaterally; No wheezes or crackles  HEART: Normal S1/S2; Regular rate and rhythm; No murmurs, rubs, or gallops  ABDOMEN: Soft, Nontender, Nondistended; Bowel sounds present  PSYCH: A&Ox3      LABS:                     RADIOLOGY & ADDITIONAL TESTS:    Imaging Personally Reviewed:    Consultant(s) Notes Reviewed:      Care Discussed with Consultants/Other Providers:

## 2023-06-28 NOTE — PROGRESS NOTE ADULT - PROBLEM SELECTOR PLAN 6
Home medications: Lantus 14 qhs and Admelog 8u TID  - BG elevated in the 200-300s  - increased insulin regimen to Lantus 14U and Admelog 2U

## 2023-06-28 NOTE — PROGRESS NOTE ADULT - ATTENDING COMMENTS
68 y/o M with a history notable for JEAN s/p OLT 7/2020 (on MMF + pred), multiple prior infections including MDRO UTIs, bacteremia, CMV viremia presenting with F and R sided back pain found to have L sided ureteral stone, and corynebacterium bacteremia (s/p abx 6/12-6/23) . C/b encephalopathy (resolved) s/p right hip aspiration with IR (no growth). Medically optimized for dispo to [facility]    #Corynebacterium bacteremia s/p abx  #CKD 3  #CMV  #Back Pain  #Encephalopathy 2/2 ertapenem    - bacteremia of unclear source or contaminant vs right foot TMA site. Empirically treated and completed abx (last dose vanco 6/23). Right hip aspirate culture ngtd, unlikely septic arthritis. Patient unable to tolerate repeat MRI for right foot to assess for OM  - mental status improved after dc ertapenem  - Continue MMF + pred, holding everolimus, restart in future as per hepatology  - MRI spine with cervical and lumbar DJD, c/w pain control  - titrating insulin, monitor FS  - Pending d/c to [facility]

## 2023-06-28 NOTE — PROGRESS NOTE ADULT - PROBLEM SELECTOR PLAN 2
Presents with R flank pain radiating to the right side of the abdomen; UA + LE and +WBC  CTAP - 6 x 3 mm stone seen within the left distal ureter with L hydronephrosis; asymm urinary bladder thickening; L>R perinephric stranding;  - No intervention per urology  - medical management for stone  - MRI spine without evidence of OM or discitis.   - Pain management per Chronic pain service.  - MR foot repeat pending, relatively incomplete showing possible OM of 4th and 5th metatarsal/cuneiform.  - per podiatry no surgical intervention for rt foot, cleared by podiatry for discharge

## 2023-06-28 NOTE — PROGRESS NOTE ADULT - PROBLEM SELECTOR PLAN 9
- pt requested sutures from Mohs procedure be taken out by dermatology before he goes to rehab  - dermatology removed sutures on 6/26/23

## 2023-06-29 NOTE — CHART NOTE - NSCHARTNOTEFT_GEN_A_CORE
Bedside Commode:  Patient does not have a toilet in his bedroom. Based on patients ongoing issues with deconditioning and generalized weakness secondary to patient's diagnosis of Debility (ICD: R54), patient will require a bedside commode.    --  Rolling Walker:  Due to patients deconditioning and generalized weakness, secondary to Debility (ICD: R54), patient will require a rolling walker. This is necessary to achieve daily tasks and therapies which cannot be achieved with the use of a cane. Patient and family are in agreement with rolling walker use at home and assistance will be provided if needed. Bedside Commode:  Patient does not have a toilet in his bedroom. Based on patients ongoing issues with deconditioning and generalized weakness secondary to patient's diagnosis of Debility (ICD: R54), patient will require a bedside commode. Bedside Commode:  Patient is confined to a single room without a bathroom and cannot ambulate to bathroom. Based on patients ongoing issues with deconditioning and generalized weakness secondary to patient's diagnosis of Debility (ICD: R54), patient will require a bedside commode.

## 2023-06-29 NOTE — PROGRESS NOTE ADULT - TIME BILLING
reviewing emr, labs, imaging, discussing with patient, consultants, documentation
reviewing emr, labs, imaging, discussing with patient, consultants, documentation
reviewing emr, labs, imaging, discussing with patient, documentation
reviewing emr, labs, imaging, discussing with patient, documentation
reviewing emr, labs, imaging, coordination of care, discussion with patient, documentation
reviewing emr, labs, imaging, coordination of care, documentation

## 2023-06-29 NOTE — PROGRESS NOTE ADULT - ATTENDING COMMENTS
66 y/o M with a history notable for JEAN s/p OLT 7/2020 (on MMF + pred), multiple prior infections including MDRO UTIs, bacteremia, CMV viremia presenting with F and R sided back pain found to have L sided ureteral stone, and corynebacterium bacteremia (s/p abx 6/12-6/23) . C/b encephalopathy (resolved) s/p right hip aspiration with IR (no growth). Medically optimized for dispo to [facility]    #Corynebacterium bacteremia s/p abx  #CKD 3  #CMV  #Back Pain  #Encephalopathy 2/2 ertapenem    - bacteremia of unclear source or contaminant vs right foot TMA site. Empirically treated and completed abx (last dose vanco 6/23). Right hip aspirate culture ngtd, unlikely septic arthritis. 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

## 2023-06-29 NOTE — PROGRESS NOTE ADULT - SUBJECTIVE AND OBJECTIVE BOX
Patient is a 67y old  Male who presents with a chief complaint of back pain (28 Jun 2023 12:02)      SUBJECTIVE / OVERNIGHT EVENTS: Pt stated he is feeling well. Pt reports no acute events overnight. He does not want to go to rehab now since he feel he is able to walk on his own.      MEDICATIONS  (STANDING):  acetaminophen     Tablet .. 975 milliGRAM(s) Oral every 8 hours  chlorhexidine 4% Liquid 1 Application(s) Topical daily  dextrose 5%. 1000 milliLiter(s) (50 mL/Hr) IV Continuous <Continuous>  dextrose 5%. 1000 milliLiter(s) (100 mL/Hr) IV Continuous <Continuous>  dextrose 50% Injectable 25 Gram(s) IV Push once  dextrose 50% Injectable 12.5 Gram(s) IV Push once  dextrose 50% Injectable 25 Gram(s) IV Push once  dextrose Oral Gel 15 Gram(s) Oral once  folic acid 1 milliGRAM(s) Oral daily  glucagon  Injectable 1 milliGRAM(s) IntraMuscular once  heparin   Injectable 5000 Unit(s) SubCutaneous every 8 hours  insulin glargine Injectable (LANTUS) 14 Unit(s) SubCutaneous at bedtime  insulin lispro (ADMELOG) corrective regimen sliding scale   SubCutaneous three times a day before meals  insulin lispro (ADMELOG) corrective regimen sliding scale   SubCutaneous at bedtime  insulin lispro Injectable (ADMELOG) 2 Unit(s) SubCutaneous three times a day before meals  lidocaine   4% Patch 3 Patch Transdermal every 24 hours  melatonin 3 milliGRAM(s) Oral at bedtime  mycophenolate mofetil 500 milliGRAM(s) Oral two times a day  pantoprazole    Tablet 40 milliGRAM(s) Oral before breakfast  predniSONE   Tablet 10 milliGRAM(s) Oral daily  rifAXIMin 550 milliGRAM(s) Oral two times a day  tamsulosin 0.4 milliGRAM(s) Oral at bedtime  valGANciclovir 450 milliGRAM(s) Oral daily    MEDICATIONS  (PRN):      CAPILLARY BLOOD GLUCOSE      POCT Blood Glucose.: 282 mg/dL (29 Jun 2023 13:55)  POCT Blood Glucose.: 220 mg/dL (29 Jun 2023 09:03)  POCT Blood Glucose.: 395 mg/dL (28 Jun 2023 21:59)  POCT Blood Glucose.: 310 mg/dL (28 Jun 2023 17:33)    I&O's Summary    28 Jun 2023 07:01  -  29 Jun 2023 07:00  --------------------------------------------------------  IN: 240 mL / OUT: 1150 mL / NET: -910 mL        Vital Signs Last 24 Hrs  T(C): 36.3 (29 Jun 2023 14:13), Max: 36.9 (28 Jun 2023 21:42)  T(F): 97.4 (29 Jun 2023 14:13), Max: 98.4 (28 Jun 2023 21:42)  HR: 65 (29 Jun 2023 14:13) (65 - 82)  BP: 131/48 (29 Jun 2023 14:13) (122/45 - 132/54)  BP(mean): --  RR: 18 (29 Jun 2023 14:13) (18 - 18)  SpO2: 98% (29 Jun 2023 14:13) (97% - 98%)    Parameters below as of 29 Jun 2023 14:13  Patient On (Oxygen Delivery Method): room air    PHYSICAL EXAM:  GENERAL: NAD, well-developed, well-nourished  HEAD: Atraumatic, Normocephalic  NECK: Supple  CHEST/LUNG: Clear to auscultation bilaterally; No wheezes or crackles  HEART: Normal S1/S2; Regular rate and rhythm; No murmurs, rubs, or gallops  ABDOMEN: Soft, Nontender, Nondistended; Bowel sounds present  PSYCH: A&Ox3      LABS:                     RADIOLOGY & ADDITIONAL TESTS:    Imaging Personally Reviewed:    Consultant(s) Notes Reviewed:      Care Discussed with Consultants/Other Providers:

## 2023-06-30 NOTE — DISCHARGE NOTE NURSING/CASE MANAGEMENT/SOCIAL WORK - NSDCFUADDAPPT_GEN_ALL_CORE_FT
Podiatry Discharge Instructions:  Follow up: Please follow up with Dr. Carty within 1 week of discharge from the hospital, please call [phone] for appointment and discuss that you recently were seen in the hospital.  Weight bearing: Please weight bear as tolerated in a surgical shoe.  Antibiotics: Please continue as instructed.

## 2023-06-30 NOTE — PROGRESS NOTE ADULT - ATTENDING SUPERVISION STATEMENT
Fellow
Fellow
Resident

## 2023-06-30 NOTE — DISCHARGE NOTE NURSING/CASE MANAGEMENT/SOCIAL WORK - NSDCVIVACCINE_GEN_ALL_CORE_FT
influenza, injectable, quadrivalent, preservative free; 17-Dec-2019 10:15; Rajinder Rivera (RN); [employer]; [id number] (Exp. Date: 30-Jun-2020); IntraMuscular; Deltoid Right.; 0.5 milliLiter(s); VIS (VIS Published: 15-Aug-2019, VIS Presented: 17-Dec-2019);   Pneumococcal conjugate PCV 13; 16-Dec-2019 18:28; Jodie Serrano (RN); [facility]; [id number] (Exp. Date: 01-Jun-2021); IntraMuscular; Deltoid Right.; 0.5 milliLiter(s); VIS (VIS Published: 05-Nov-2015, VIS Presented: 16-Dec-2019);   pneumococcal polysaccharide PPV23; 18-Feb-2020 13:31; Carroll Guardado (RN); [employer] [id number] (Exp. Date: 18-May-2021); IntraMuscular; Deltoid Right.; 0.5 milliLiter(s); VIS (VIS Published: 06-Oct-2009, VIS Presented: 18-Feb-2020);

## 2023-06-30 NOTE — PROGRESS NOTE ADULT - ATTENDING COMMENTS
66 y/o M with a history notable for JEAN s/p OLT 7/2020 (on MMF + pred), multiple prior infections including MDRO UTIs, bacteremia, CMV viremia presenting with F and R sided back pain found to have L sided ureteral stone, and corynebacterium bacteremia (s/p abx 6/12-6/23) . C/b encephalopathy (resolved) s/p right hip aspiration with IR (no growth). Medically optimized d/c home today. d/c time spent by provider 40 min.     #Corynebacterium bacteremia s/p abx  #CKD 3  #CMV  #Back Pain  #Encephalopathy 2/2 ertapenem

## 2023-06-30 NOTE — PROGRESS NOTE ADULT - REASON FOR ADMISSION
back pain

## 2023-06-30 NOTE — DISCHARGE NOTE NURSING/CASE MANAGEMENT/SOCIAL WORK - PATIENT PORTAL LINK FT
You can access the FollowMyHealth Patient Portal offered by [facility] by registering at the following website: [email or web address]. By joining [facility]’s FollowMyHealth portal, you will also be able to view your health information using other applications (apps) compatible with our system.

## 2023-06-30 NOTE — PROGRESS NOTE ADULT - SUBJECTIVE AND OBJECTIVE BOX
Patient is a 67y old  Male who presents with a chief complaint of back pain (29 Jun 2023 14:44)      SUBJECTIVE / OVERNIGHT EVENTS:  Pt stated he is feeling well. Pt reports no acute events overnight.    MEDICATIONS  (STANDING):  acetaminophen     Tablet .. 975 milliGRAM(s) Oral every 8 hours  chlorhexidine 4% Liquid 1 Application(s) Topical daily  dextrose 5%. 1000 milliLiter(s) (50 mL/Hr) IV Continuous <Continuous>  dextrose 5%. 1000 milliLiter(s) (100 mL/Hr) IV Continuous <Continuous>  dextrose 50% Injectable 25 Gram(s) IV Push once  dextrose 50% Injectable 12.5 Gram(s) IV Push once  dextrose 50% Injectable 25 Gram(s) IV Push once  dextrose Oral Gel 15 Gram(s) Oral once  folic acid 1 milliGRAM(s) Oral daily  glucagon  Injectable 1 milliGRAM(s) IntraMuscular once  heparin   Injectable 5000 Unit(s) SubCutaneous every 8 hours  insulin glargine Injectable (LANTUS) 20 Unit(s) SubCutaneous at bedtime  insulin lispro (ADMELOG) corrective regimen sliding scale   SubCutaneous three times a day before meals  insulin lispro (ADMELOG) corrective regimen sliding scale   SubCutaneous at bedtime  insulin lispro Injectable (ADMELOG) 8 Unit(s) SubCutaneous three times a day before meals  lidocaine   4% Patch 3 Patch Transdermal every 24 hours  melatonin 3 milliGRAM(s) Oral at bedtime  mycophenolate mofetil 500 milliGRAM(s) Oral two times a day  pantoprazole    Tablet 40 milliGRAM(s) Oral before breakfast  predniSONE   Tablet 10 milliGRAM(s) Oral daily  rifAXIMin 550 milliGRAM(s) Oral two times a day  tamsulosin 0.4 milliGRAM(s) Oral at bedtime  valGANciclovir 450 milliGRAM(s) Oral daily    MEDICATIONS  (PRN):      CAPILLARY BLOOD GLUCOSE      POCT Blood Glucose.: 216 mg/dL (30 Jun 2023 08:19)  POCT Blood Glucose.: 310 mg/dL (29 Jun 2023 21:12)  POCT Blood Glucose.: 334 mg/dL (29 Jun 2023 17:14)  POCT Blood Glucose.: 282 mg/dL (29 Jun 2023 13:55)  POCT Blood Glucose.: 220 mg/dL (29 Jun 2023 09:03)    I&O's Summary    29 Jun 2023 07:01  -  30 Jun 2023 07:00  --------------------------------------------------------  IN: 0 mL / OUT: 1350 mL / NET: -1350 mL        Vital Signs Last 24 Hrs  T(C): 36.9 (29 Jun 2023 21:14), Max: 36.9 (29 Jun 2023 21:14)  T(F): 98.4 (29 Jun 2023 21:14), Max: 98.4 (29 Jun 2023 21:14)  HR: 82 (29 Jun 2023 21:14) (65 - 82)  BP: 103/42 (30 Jun 2023 04:12) (103/42 - 135/82)  BP(mean): --  RR: 18 (30 Jun 2023 04:12) (18 - 18)  SpO2: 96% (30 Jun 2023 04:12) (96% - 99%)    Parameters below as of 30 Jun 2023 04:12  Patient On (Oxygen Delivery Method): room air        PHYSICAL EXAM:  GENERAL: NAD, well-developed, well-nourished  NECK: Supple  CHEST/LUNG: Clear to auscultation bilaterally; No wheezes or crackles  HEART: Normal S1/S2; Regular rate and rhythm; No murmurs, rubs, or gallops  ABDOMEN: Soft, Nontender, Nondistended; Bowel sounds present  PSYCH: A&Ox3    LABS:                     RADIOLOGY & ADDITIONAL TESTS:    Imaging Personally Reviewed:    Consultant(s) Notes Reviewed:      Care Discussed with Consultants/Other Providers:

## 2023-06-30 NOTE — PROGRESS NOTE ADULT - PROVIDER SPECIALTY LIST ADULT
Internal Medicine
Podiatry
Transplant ID
Hepatology
Internal Medicine
Pain Medicine
Transplant ID
Hepatology
Internal Medicine
Transplant ID
Transplant ID
Urology
Hepatology
Hepatology
Transplant ID
Urology
Internal Medicine

## 2023-06-30 NOTE — PROGRESS NOTE ADULT - PROBLEM SELECTOR PLAN 10
- Diet: DASH/TLC  - DVT: heparin subQ  Dispo: Pt wants to go to rehab

## 2023-06-30 NOTE — PROGRESS NOTE ADULT - PROBLEM SELECTOR PLAN 8
Medication and Quantity requested:     amLODIPine (NORVASC) 5 MG tablet     Quantity: ? Last Visit ?       Pharmacy and phone number updated in EPIC:  Yes [facility] f/u outpatient with cardiology

## 2023-06-30 NOTE — DISCHARGE NOTE NURSING/CASE MANAGEMENT/SOCIAL WORK - NSDCPEFALRISK_GEN_ALL_CORE
For information on Fall & Injury Prevention, visit: [email or web address] OR  [email or web address] OR  https://www.cdc.gov/steadi/patient.html

## 2023-07-09 NOTE — PRE-ANESTHESIA EVALUATION ADULT - NSPROPOSEDPROCEDFT_GEN_ALL_CORE
Orthotopic Liver Transplant 39 year old female, presenting to ED c/o dizziness, chest pain, dysuria, and generalized weakness x1 week. Denies n/v/d/fevers/chills. Pt A&Ox4, ambulatory baseline.

## 2023-07-10 NOTE — ED PROVIDER NOTE - OBJECTIVE STATEMENT
Patient is a 66 yo male with PMHx obesity, IDDM, HFpEF, CKD, JEAN cirrhosis s/p liver transplant, VRE bacteremia, RLE debridements s/p R TMA presenting with back pain and generalized weakness. Patient is a 68 yo male with PMHx obesity, IDDM, HFpEF, CKD, JEAN cirrhosis s/p liver transplant, VRE bacteremia, RLE debridements s/p R TMA presenting with back pain and generalized weakness. Patient states he has had several days of generalized weakness, difficulty ambulating on his own and R flank and midline lumbosacral pain. Denies falls, fever, n/v, syncope, CP, SOB; patient has been urinating on his own however has had decreased urinary frequency. Denies urinary incontinence, bowel incontinence, blood thinners, IVDU, paresthesias. Patient has had decreased appetite. Denies fevers, chills, dizziness, lightheadedness, dysphagia, dysarthria, diplopia, photophobia, syncope, cough, congestion, SOB, CP, abdominal pain, neck pain, diarrhea, dysuria, hematuria, hematochezia, hematemesis, n/v, recent travel, sick contacts, leg swelling.

## 2023-07-10 NOTE — H&P ADULT - ASSESSMENT
66 yo male with PMHx obesity, IDDM, HFpEF, CKD, JEAN cirrhosis s/p liver transplant, VRE bacteremia, RLE debridements s/p R TMA here for acute renal failure    #Acute renal failure  #Chronic Renal stone L Kidney  #BPH  Hx of CKD, baseline Cr 2.2  Now >5  recently treated for extended period of time at [facility] for bacteremia and cellulitis vs OM  Poor oral intake for 3 days  TTE from June 29 2023 at [facility] denotes normal LV function, no quantitative EF provided  Chronic 6mm stone in L kidney  Lactic acidosis  - Start Bicarb Drip  - Bob to be placed in ED, strict I/Os  - PO diet  - Nephro on board - Likely pre-renal, monitor w/ bob and IV Hydration  - flomax  - trend lactate  - f/u nephro recs    #JEAN Cirrhosis s/p Liver Transplant  transplanted several years ago  on cellcept and chronic prednisone  - monitor LFTs  - c/w cellcept, prednisone and valgancyclovir  - Due to chronic immunosuppressive therapy and recent VRE bacteremia hx, start zosyn (s/p Vanco/Zosyn in ED), obtain blood and urine cultures, if positive, call ID  - caution w/ hepatotoxic meds    #Anemia  likely 2/2 chronic disease  - monitor cbc  - maintain H/H >7    #DM2 w/ hyperglycemia  - c/w lantus and Premeal Insulin  - ISS and FSG QAC and HS  - Consistent Carb Diet    #Healthcare Maintenance  DVT PPx - HSQ  Dispo - Acute

## 2023-07-10 NOTE — ED ADULT NURSE NOTE - OBJECTIVE STATEMENT
Late Note  67M Pt alert and oriented x3, no acute distress noted, calm and pleasant with respirations even and unlabored, skin cool, dry, with slight pallor, speech clear, moving extremities with strength and purpose, c/o back pain, weakness and poor po intake for over a week. Pt made aware of plan of care and verbalized understanding of same. Pt was seen by provider prior to full RN assessment and full assessment deferred to same.

## 2023-07-10 NOTE — ED ADULT TRIAGE NOTE - IDEAL BODY WEIGHT(KG)
Please call Jessica/[facility] at [phone] to discuss medication issue.  Stating pharmacy needs to be ALPS and this is in regards to an injection as well as a calcium with vitamin Rx.  
Sent Calcium 600 mg BID. Sent Vitamin D 1000 units PO qd.  
Shingrix sent.  Advised Vitamin D 1000 units daily and Calcium 5822-5274 mg daily.    
Spoke with Jessica at [facility] and requesting Shingrix be sent to [facility]. Informed of shortage. Requesting Calcium Carbonate-Vitamin D be sent to [facility].    Calcium Carbonate Vitamin D entered historically and stated she has not had this with them, however requesting it.    Pending Shingrix. Please advise for dose for Calcium Carbonate Vitamin D  
80

## 2023-07-10 NOTE — ED PROVIDER NOTE - PROGRESS NOTE DETAILS
JK - CT showing mild bilateral pleural effusions, chronic renal calculus; labs significant for ARF, likely prerenal. Electrolytes WNL. VSS resting comfortably. Nephrology consulted, recommended admission for IV hydration, Urine output monitoring via bob. Currently stable, ready for admission.

## 2023-07-10 NOTE — ED PROVIDER NOTE - ATTENDING CONTRIBUTION TO CARE
I, Jose Enrique Batista, performed a face to face bedside interview with this patient regarding history of present illness, review of symptoms and relevant past medical, social and family history.  I completed an independent physical examination. I have communicated the patient’s plan of care and disposition with the resident.  67 year old male with PMH JEAN cirrhosis s/p liver transplant, DM, HTN, obesity, chronic back pain/spinal stenosis, and recent admission for bacteremia and osteo of the foot presents with back pain. Pt reports 2 weeks of progressively worsening low back pain. Pain is constant, worse with movement, no numbness, tingling. Pt denies incontinence but does report decreased urinary output  Gen: NAD, well appearing  CV: RRR  Pul: CTA b/l  Abd: Soft, non-distended, non-tender  Neuro: no focal deficits  Pt with no focal deficits. Suspect pre-renal ARF secondary to back pain, admitted to medicine

## 2023-07-10 NOTE — H&P ADULT - CONVERSATION DETAILS
Due to hx of transplanted liver s/p cirrhosis, CKD and DM2, discussed GOC with patient. Explained that due to multiple comorbidities, has an increased chance of mortality in the hospital. Discussed the spectrum of goals and advance directives at length. At this time patient wishes for all measures, however understands that if after live saving measures there is no way for him to return to life as he was before coming to the hospital, then he would not want that. Requesting full code for now, patient will remain FULL CODE.

## 2023-07-10 NOTE — H&P ADULT - HISTORY OF PRESENT ILLNESS
66 yo male with PMHx obesity, IDDM, HFpEF, CKD, JEAN cirrhosis s/p liver transplant, VRE bacteremia, RLE debridements s/p R TMA presenting with back pain and generalized weakness. Patient states that for the last 3 days he has been complaining of weakness, R flank and midline LS pain. Was most recently discharged from [facility] on 6/29/2023 for cellulitis/OM admission w/ bacteremia. States that for the past few days he has been unable to urinate. Has a hx of a chronic 6mm renal stone in his L kidney w/ hydronephrosis. CT revealing small ascites and pleural effusions. Patient complaining of headache, weakness, dizziness, dry mouth and unsteady gait. Denies CP, SOB, abd pain, hematochezia, rashes, fevers.

## 2023-07-10 NOTE — H&P ADULT - NSHPLABSRESULTS_GEN_ALL_CORE
LABS:                         9.9    8.52  )-----------( 216      ( 10 Jul 2023 15:44 )             30.8     07-10    131<L>  |  96  |  109.1<H>  ----------------------------<  303<H>  5.1   |  12.0<L>  |  5.01<H>    Ca    8.7      10 Jul 2023 15:44  Mg     2.4     07-10    TPro  6.9  /  Alb  3.6  /  TBili  0.5  /  DBili  x   /  AST  38  /  ALT  48<H>  /  AlkPhos  114  07-10      Urinalysis Basic - ( 10 Jul 2023 15:44 )    Color: x / Appearance: x / SG: x / pH: x  Gluc: 303 mg/dL / Ketone: x  / Bili: x / Urobili: x   Blood: x / Protein: x / Nitrite: x   Leuk Esterase: x / RBC: x / WBC x   Sq Epi: x / Non Sq Epi: x / Bacteria: x                RADIOLOGY, EKG & ADDITIONAL TESTS:     CT Renal Stone Hunt:  Developing small ascites and small bilateral pleural effusions suggestive   of fluid overload.    Mild left hydroureteronephrosis to the level of a 6 mm distal ureteral   calculus, unchanged from prior.    CT lumbar Spine:  Mild to moderate degenerative disc disease and spondylosis   at T11-T12, T12-L1, and L4-5 with loss of disc height and associated   degenerative endplate changes. Mild disc bulges noted at L1-2 through   L5-S1 which flatten the ventral thecal sac and narrow the BILATERAL   neural foramina. Mild to moderate central stenosis at L2-3, moderate to   severe central stenosis at L3-4 and severe central stenosis at L4-5 on a   degenerative basis due to disc bulge, facet osteophytic hypertrophy and   redundancy of ligamentum flavum. Marked facet osteophytic hypertrophy is   noted at L5-S1.   No vertebral fracture is recognized.    CXR:  Enlarged cardiac silhouette.  Mildly elevated right hemidiaphragm.  Right basilar and left midlung linear atelectasis

## 2023-07-10 NOTE — ED PROVIDER NOTE - PHYSICAL EXAMINATION
Constitutional: Well appearing, awake, alert, oriented to person, place, and time/situation and in no apparent distress  ENMT: Airway patent nasal mucosa clear. Mouth with normal mucosa. Throat has no vesicles no oropharyngeal exudates and uvula is midline. No blood in the oropharynx  EYES: clear bilaterally, pupils equal, round and reactive to light  Cardiac: Regular rate, regular rhythm. Heart sounds S1, S2. No murmurs, rubs or gallops. Good capillary refill, 2+ pulses, no peripheral edema  Respiratory: Lungs CTAB, no use of accessory muscles, no crackles, satting 97% on RA in no distress  Gastrointestinal: Abdomen mildly distended, non-tender, no rebound guarding or peritoneal signs  Genitourinary: pelvis nontender, bladder nondistended  Musculoskeletal: Spine appears normal, range of motion is not limited, midline lumbosacral ttp no stepoffs no obvious deformities, full ROM at the hips knees ankles, sensation reflexes intact 2+ pulses capillary refill < 2 seconds, good rectal tone   Neurological: Alert and oriented, no focal deficits, no motor or sensory deficits. CN 2-12 intact, PERRLA, EOMI, No FND

## 2023-07-10 NOTE — CONSULT NOTE ADULT - ASSESSMENT
67M PMHx IDDM, HFpEF, CKD, JEAN cirrhosis s/p liver transplant, VRE bacteremia, RLE debridements s/p R TMA presenting with back pain and generalized weakness. Recent admission w/ corynebactrium bacteremia (unclear source, rt foot OM, left ureteral stone) was given course of vancomycin. Nephrology consulted for MISA    1. Acute kidney injury on CKD III:  -MISA likely related to UTI, DDX AIN    -Baseline SCr:  around 1.8- 2.2, SCr 5.01 mg/dl on presentation   -UA: cloudy, pr 100, mod LE and large blood.  -Imaging:  chronic 6mm renal stone in his L kidney w/ hydronephrosis, no right HDN.    -Will volume expand and start on IVF w/ bicarb  -Insert Beasley, Flomax  -Obtain Urine/blood Cx and empirically start on Abx, maintain MAP > 65  -Low bicarb but maintaining K balance, no emergent need for RRT will follow closely.    2. Metabolic acidosis: IV w/ bicarb as above and maintain MAP > 65.     3. Hyponatremia, hypovolemic:  -IVF as above    4. OLT status: IS per GI/primary. Cellcept, prednisone and valganciclovir.    5. Anemia of chr disease: transfuse for Hb <7.    D/w Dr. Watson.

## 2023-07-10 NOTE — ED ADULT TRIAGE NOTE - CHIEF COMPLAINT QUOTE
Pt BIBA from home, c/o lower back pain, recently admitted for 3 weeks for spine infection, pt states this pain feels different, also unable to urinate since yesterday

## 2023-07-10 NOTE — CONSULT NOTE ADULT - SUBJECTIVE AND OBJECTIVE BOX
Patient is a 67y old  Male who presents with a chief complaint of Acute Renal Failure (10 Jul 2023 17:23)      HPI:  68 yo male with PMHx obesity, IDDM, HFpEF, CKD, JEAN cirrhosis s/p liver transplant, VRE bacteremia, RLE debridements s/p R TMA presenting with back pain and generalized weakness. Patient states that for the last 3 days he has been complaining of weakness, R flank and midline LS pain. Was most recently discharged from [facility] on 2023 for cellulitis/OM admission w/ bacteremia. States that for the past few days he has been unable to urinate. Has a hx of a chronic 6mm renal stone in his L kidney w/ hydronephrosis. CT revealing small ascites and pleural effusions. Patient complaining of headache, weakness, dizziness, dry mouth and unsteady gait. Denies CP, SOB, abd pain, hematochezia, rashes, fevers. (10 Jul 2023 17:23)      PAST MEDICAL & SURGICAL HISTORY:  Diabetes      Hypercholesteremia      Neuropathy      Hypertension      Renal stones  25 years ago      Fatty liver disease, nonalcoholic      Obesity      Hepatic encephalopathy      GERD with esophagitis  Gastritis & Non Bleeding Ulcers      GIB (gastrointestinal bleeding)      S/P cholecystectomy          FAMILY HISTORY:  Family history of stomach cancer    Family history of hypertension    Family history of type 2 diabetes mellitus        SOCIAL HISTORY:    MEDICATIONS  (STANDING):  dextrose 5%. 1000 milliLiter(s) (100 mL/Hr) IV Continuous <Continuous>  dextrose 5%. 1000 milliLiter(s) (50 mL/Hr) IV Continuous <Continuous>  dextrose 50% Injectable 12.5 Gram(s) IV Push once  dextrose 50% Injectable 25 Gram(s) IV Push once  dextrose 50% Injectable 25 Gram(s) IV Push once  folic acid 1 milliGRAM(s) Oral daily  glucagon  Injectable 1 milliGRAM(s) IntraMuscular once  heparin   Injectable 5000 Unit(s) SubCutaneous every 8 hours  insulin glargine Injectable (LANTUS) 14 Unit(s) SubCutaneous at bedtime  insulin lispro (ADMELOG) corrective regimen sliding scale   SubCutaneous three times a day before meals  insulin lispro (ADMELOG) corrective regimen sliding scale   SubCutaneous at bedtime  insulin lispro Injectable (ADMELOG) 8 Unit(s) SubCutaneous three times a day before meals  mycophenolate mofetil 500 milliGRAM(s) Oral two times a day  pantoprazole    Tablet 40 milliGRAM(s) Oral before breakfast  piperacillin/tazobactam IVPB.. 3.375 Gram(s) IV Intermittent every 12 hours  predniSONE   Tablet 10 milliGRAM(s) Oral daily  psyllium Powder 1 Packet(s) Oral daily  sertraline 100 milliGRAM(s) Oral daily  sodium bicarbonate  Infusion 0.052 mEq/kG/Hr (75 mL/Hr) IV Continuous <Continuous>  tamsulosin 0.4 milliGRAM(s) Oral at bedtime  valGANciclovir 450 milliGRAM(s) Oral daily    MEDICATIONS  (PRN):  acetaminophen     Tablet .. 650 milliGRAM(s) Oral every 6 hours PRN Temp greater or equal to 38C (100.4F), Mild Pain (1 - 3)  aluminum hydroxide/magnesium hydroxide/simethicone Suspension 30 milliLiter(s) Oral every 4 hours PRN Dyspepsia  dextrose Oral Gel 15 Gram(s) Oral once PRN Blood Glucose LESS THAN 70 milliGRAM(s)/deciliter  melatonin 3 milliGRAM(s) Oral at bedtime PRN Insomnia  ondansetron Injectable 4 milliGRAM(s) IV Push every 8 hours PRN Nausea and/or Vomiting  senna 2 Tablet(s) Oral at bedtime PRN Constipation      Allergies    codeine (Anaphylaxis)    Intolerances    ertapenem (Other (Mod to Severe))      REVIEW OF SYSTEMS:  CONSTITUTIONAL: No fever, weight loss, or fatigue  EYES: No eye pain, visual disturbances, or discharge  ENMT:  No difficulty hearing, tinnitus, vertigo; No sinus or throat pain  NECK: No pain or stiffness  BREASTS: No pain, masses, or nipple discharge  RESPIRATORY: No cough, wheezing, chills or hemoptysis; No shortness of breath  CARDIOVASCULAR: No chest pain, palpitations, dizziness, or leg swelling  GASTROINTESTINAL: No abdominal or epigastric pain. No nausea, vomiting, or hematemesis; No diarrhea or constipation. No melena or hematochezia.  GENITOURINARY: No dysuria, frequency, hematuria, or incontinence  NEUROLOGICAL: No headaches, memory loss, loss of strength, numbness, or tremors  SKIN: No itching, burning, rashes, or lesions   LYMPH NODES: No enlarged glands  ENDOCRINE: No heat or cold intolerance; No hair loss  MUSCULOSKELETAL: No joint pain or swelling; No muscle, back, or extremity pain  PSYCHIATRIC: No depression, anxiety, mood swings, or difficulty sleeping  HEME/LYMPH: No easy bruising, or bleeding gums  ALLERGY AND IMMUNOLOGIC: No hives or eczema    Vital Signs Last 24 Hrs  T(C): 36.3 (10 Jul 2023 18:21), Max: 36.8 (10 Jul 2023 14:33)  T(F): 97.4 (10 Jul 2023 18:21), Max: 98.2 (10 Jul 2023 14:33)  HR: 80 (10 Jul 2023 18:21) (76 - 83)  BP: 119/66 (10 Jul 2023 18:21) (102/53 - 119/66)  BP(mean): 84 (10 Jul 2023 18:21) (84 - 84)  RR: 18 (10 Jul 2023 18:21) (18 - 18)  SpO2: 98% (10 Jul 2023 18:21) (95% - 98%)    Parameters below as of 10 Jul 2023 18:21  Patient On (Oxygen Delivery Method): room air        PHYSICAL EXAM:  GENERAL: NAD, well-groomed, well-developed  HEAD:  Atraumatic, Normocephalic  EYES: EOMI, PERRLA, conjunctiva and sclera clear  ENMT: No tonsillar erythema, exudates, or enlargement; Moist mucous membranes, Good dentition, No lesions  NECK: Supple, No JVD, Normal thyroid  NERVOUS SYSTEM:  Alert & Oriented X3, Good concentration; Motor Strength 5/5 B/L upper and lower extremities; DTRs 2+ intact and symmetric  CHEST/LUNG: Clear to percussion bilaterally; No rales, rhonchi, wheezing, or rubs  HEART: Regular rate and rhythm; No murmurs, rubs, or gallops  ABDOMEN: Soft, Nontender, Nondistended; Bowel sounds present  EXTREMITIES:  2+ Peripheral Pulses, No clubbing, cyanosis, or edema  LYMPH: No lymphadenopathy noted  SKIN: No rashes or lesions    LABS:                        9.9    8.52  )-----------( 216      ( 10 Jul 2023 15:44 )             30.8     07-10    131<L>  |  96  |  109.1<H>  ----------------------------<  303<H>  5.1   |  12.0<L>  |  5.01<H>    Ca    8.7      10 Jul 2023 15:44  Mg     2.4     07-10    TPro  6.9  /  Alb  3.6  /  TBili  0.5  /  DBili  x   /  AST  38  /  ALT  48<H>  /  AlkPhos  114  07-10      Urinalysis Basic - ( 10 Jul 2023 17:35 )    Color: Yellow / Appearance: very cloudy / S.025 / pH: x  Gluc: x / Ketone: Negative  / Bili: Negative / Urobili: Negative mg/dL   Blood: x / Protein: 100 / Nitrite: Negative   Leuk Esterase: Moderate / RBC: 3-5 /HPF / WBC >50 /HPF   Sq Epi: x / Non Sq Epi: x / Bacteria: Few      Magnesium: 2.4 mg/dL (07-10 @ 15:44)      RADIOLOGY & ADDITIONAL TESTS: Reviewed   HPI: 67M PMHx IDDM, HFpEF, CKD, JEAN cirrhosis s/p liver transplant, VRE bacteremia, RLE debridements s/p R TMA presenting with back pain and generalized weakness. Patient states that for the last 3 days he has been complaining of weakness, R flank and midline LS pain. Was most recently discharged from [facility] on 2023 for cellulitis/OM admission w/ bacteremia. States that for the past few days he has been unable to urinate. Has a hx of a chronic 6mm renal stone in his L kidney w/ hydronephrosis, no right HDN. CT revealing small ascites and pleural effusions. Patient complaining of headache, weakness, dizziness, dry mouth and unsteady gait. Denies CP, SOB, abd pain, hematochezia, rashes, fevers.    Recent admission w/ corynebactrium bacteremia (unclear source, rt foot OM, left ureteral stone) was given course of vancomycin.  Nephrology consulted for MISA, SCr 5.01 mg/dl on presentation baseline around 1.8- 2, Co2 12. UA cloudy, pr 100, mod LE and large blood.  Denies NAID use.    PAST MEDICAL & SURGICAL HISTORY:  Diabetes  Hypercholesteremia  Neuropathy  Hypertension  Nephrolithiasis  Fatty liver disease, nonalcoholic  Obesity  Hepatic encephalopathy  GERD with esophagitis  Gastritis & Non Bleeding Ulcers  GIB (gastrointestinal bleeding)  S/P cholecystectomy    FAMILY HISTORY:  Family history of stomach cancer  Family history of hypertension  Family history of type 2 diabetes mellitus    SOCIAL HISTORY:  Denies tobacco, alcohol, drug abuse.     MEDICATIONS  (STANDING):  dextrose 5%. 1000 milliLiter(s) (100 mL/Hr) IV Continuous <Continuous>  dextrose 5%. 1000 milliLiter(s) (50 mL/Hr) IV Continuous <Continuous>  dextrose 50% Injectable 12.5 Gram(s) IV Push once  dextrose 50% Injectable 25 Gram(s) IV Push once  dextrose 50% Injectable 25 Gram(s) IV Push once  folic acid 1 milliGRAM(s) Oral daily  glucagon  Injectable 1 milliGRAM(s) IntraMuscular once  heparin   Injectable 5000 Unit(s) SubCutaneous every 8 hours  insulin glargine Injectable (LANTUS) 14 Unit(s) SubCutaneous at bedtime  insulin lispro (ADMELOG) corrective regimen sliding scale   SubCutaneous three times a day before meals  insulin lispro (ADMELOG) corrective regimen sliding scale   SubCutaneous at bedtime  insulin lispro Injectable (ADMELOG) 8 Unit(s) SubCutaneous three times a day before meals  mycophenolate mofetil 500 milliGRAM(s) Oral two times a day  pantoprazole    Tablet 40 milliGRAM(s) Oral before breakfast  piperacillin/tazobactam IVPB.. 3.375 Gram(s) IV Intermittent every 12 hours  predniSONE   Tablet 10 milliGRAM(s) Oral daily  psyllium Powder 1 Packet(s) Oral daily  sertraline 100 milliGRAM(s) Oral daily  sodium bicarbonate  Infusion 0.052 mEq/kG/Hr (75 mL/Hr) IV Continuous <Continuous>  tamsulosin 0.4 milliGRAM(s) Oral at bedtime  valGANciclovir 450 milliGRAM(s) Oral daily    MEDICATIONS  (PRN):  acetaminophen     Tablet .. 650 milliGRAM(s) Oral every 6 hours PRN Temp greater or equal to 38C (100.4F), Mild Pain (1 - 3)  aluminum hydroxide/magnesium hydroxide/simethicone Suspension 30 milliLiter(s) Oral every 4 hours PRN Dyspepsia  dextrose Oral Gel 15 Gram(s) Oral once PRN Blood Glucose LESS THAN 70 milliGRAM(s)/deciliter  melatonin 3 milliGRAM(s) Oral at bedtime PRN Insomnia  ondansetron Injectable 4 milliGRAM(s) IV Push every 8 hours PRN Nausea and/or Vomiting  senna 2 Tablet(s) Oral at bedtime PRN Constipation    ALLERGIES:  codeine (Anaphylaxis)    REVIEW OF SYSTEMS: All systems were reviewed in detail. Pertinent positive and negative have been detailed in HPI, otherwise negative.     Vital Signs Last 24 Hrs  T(C): 36.3 (10 Jul 2023 18:21), Max: 36.8 (10 Jul 2023 14:33)  T(F): 97.4 (10 Jul 2023 18:21), Max: 98.2 (10 Jul 2023 14:33)  HR: 80 (10 Jul 2023 18:21) (76 - 83)  BP: 119/66 (10 Jul 2023 18:21) (102/53 - 119/66)  BP(mean): 84 (10 Jul 2023 18:21) (84 - 84)  RR: 18 (10 Jul 2023 18:21) (18 - 18)  SpO2: 98% (10 Jul 2023 18:21) (95% - 98%)  Parameters below as of 10 Jul 2023 18:21  Patient On (Oxygen Delivery Method): room air    PHYSICAL EXAM:  Gen: no acute distress  MS: alert, conversing normally  Eyes: EOMI, no icterus  HENT: NCAT, MMM  CV: rhythm reg reg, rate normal, no m/g/r, no LE edema  Chest: CTAB, no w/r/r,  Abd: soft, NT, ND  Neuro: moving all 4 limbs spontaneously, no tremor  MSK: normal bulk and tone, no joint swelling  Skin: dry, warm, no rash or jaundice    LABS:                        9.9    8.52  )-----------( 216      ( 10 Jul 2023 15:44 )             30.8     07-10    131<L>  |  96  |  109.1<H>  ----------------------------<  303<H>  5.1   |  12.0<L>  |  5.01<H>    Ca    8.7      10 Jul 2023 15:44  Mg     2.4     07-10    TPro  6.9  /  Alb  3.6  /  TBili  0.5  /  DBili  x   /  AST  38  /  ALT  48<H>  /  AlkPhos  114  07-10    Urinalysis Basic - ( 10 Jul 2023 17:35 )  Color: Yellow / Appearance: very cloudy / S.025 / pH: x  Gluc: x / Ketone: Negative  / Bili: Negative / Urobili: Negative mg/dL   Blood: x / Protein: 100 / Nitrite: Negative   Leuk Esterase: Moderate / RBC: 3-5 /HPF / WBC >50 /HPF   Sq Epi: x / Non Sq Epi: x / Bacteria: Few    RADIOLOGY & ADDITIONAL TESTS: Reviewed

## 2023-07-10 NOTE — PRE-OP CHECKLIST - VERIFY SURGICAL SITE/SIDE WITH PATIENT
COLONOSCOPY REPORT    Nasrin Conrad     1/10/1975 Age 50year old   PCP Willie Renteria MD Endoscopist Rosina Herr MD     Date of procedure: 07/10/23    Location: [address] [place] [address])    Procedure: Colonoscopy with cold snare and cold biopsy    Pre-operative diagnosis: CRC screening    Post-operative diagnosis: colon polyps, internal hemorrhoids, hypertrophied anal papilla      Medications: MAC    Withdrawal time: 33 minutes    Procedure:  Informed consent was obtained from the patient after the risks of the procedure were discussed, including but not limited to bleeding, perforation, aspiration, infection, or possibility of a missed lesion. After discussions of the risks/benefits and alternatives to this procedure, as well as the planned sedation, the patient was placed in the left lateral decubitus position and begun on continuous blood pressure pulse oximetry and EKG monitoring and this was maintained throughout the procedure. Once an adequate level of sedation was obtained a digital rectal exam was completed. Then the lubricated tip of the [id number] diagnostic video colonoscope was inserted and advanced without difficulty to the cecum using the CO2 insufflation technique. The cecum was identified by localizing the trifold, the appendix and the ileocecal valve. Withdrawal was begun with thorough washing and careful examination of the colonic walls and folds. A routine second examination of the cecum/ascending colon was performed. Photodocumentation was obtained. The bowel prep was good. Views of the colon were good with washing. I then carefully withdrew the instrument from the patient who tolerated the procedure well. Complications: none. Findings:   1. 3 polyps noted as follows:      A. 6 mm polyp at the IC valve; sessile morphology; completely removed by cold snare polypectomy and retrieved.       B. 10 mm polyp in the ascending colon; sessile morphology; completely removed by cold snare polypectomy and retrieved. C. 2 mm polyp in the rectosigmoid colon; sessile morphology; completely removed by cold forceps biopsies and retrieved. 2. Diverticulosis: none. 3. Terminal ileum: the visualized mucosa appeared unremarkable. 4. A retroflexed view of the rectum revealed small internal hemorrhoids and a hypertrophied anal papilla. 5. The colonic mucosa throughout the colon showed normal vascular pattern, without evidence of angioectasias or inflammation. 6. MANJU: normal rectal tone, no masses palpated. Impression: Three polyps measuring 2-10 mm, resected and retrieved. Small internal hemorrhoids. A hypertrophied anal papilla. Recommend:  Await pathology. The interval for the next colonoscopy will be determined after reviewing pathology (likely 3 years). If new signs or symptoms develop, colonoscopy may need to be repeated sooner. High fiber diet. Monitor for blood in the stool. If having more than just tinge of blood, call office or go to the ER.    >>>If tissue was obtained and you have not received your pathology results either by phone or letter within 2 weeks, please call our office at [phone].     Specimens: IC valve polyp, ascending colon polyp, rectosigmoid colon polyp    Blood loss: <1 ml done

## 2023-07-10 NOTE — H&P ADULT - NSHPPHYSICALEXAM_GEN_ALL_CORE
PHYSICAL EXAM:    General: obese, weak, in no acute distress  Eyes: PERRLA, EOMI; conjunctiva and sclera clear  Head: Normocephalic; atraumatic  ENMT: No nasal discharge; airway clear  Neck: Supple; non tender; no masses  Respiratory: No wheezes, rales or rhonchi  Cardiovascular: Regular rate and rhythm. S1 and S2 Normal; No murmurs, gallops or rubs  Gastrointestinal: Soft non-tender, obese, non-distended; Normal bowel sounds  Genitourinary: No costovertebral angle tenderness  Extremities: Normal range of motion, No clubbing, cyanosis or edema  Vascular: Peripheral pulses palpable 2+ bilaterally  Neurological: Alert and oriented x4  Skin: Warm and dry. No acute rash, scab on L scalp 2/2 "cancer removal"  Lymph Nodes: No acute cervical adenopathy  Musculoskeletal: unsteady gait, tone, without deformities  Psychiatric: Cooperative and appropriate

## 2023-07-10 NOTE — ED PROVIDER NOTE - CLINICAL SUMMARY MEDICAL DECISION MAKING FREE TEXT BOX
Patient is a 66 yo male with PMHx obesity, IDDM, HFpEF, CKD, JEAN cirrhosis s/p liver transplant, VRE bacteremia, RLE debridements s/p R TMA presenting with back pain and generalized weakness. Patient states he has had several days of generalized weakness, difficulty ambulating on his own and R flank and midline lumbosacral pain. VSS    Will check labs, evaluate for infection with UA Ucx BCx, r/o electrolyte abnormalities, CT renal stone hunt and L spine to r/o acute fracture vs. nephrolithiasis, hydrate, reassess.

## 2023-07-11 NOTE — CHART NOTE - NSCHARTNOTEFT_GEN_A_CORE
Called to see patient for evaluation of Chronic Lt. ureteral stone  ( 6mm ).  Full consultation to follow in am     Dr. Chi reviewed labs and radiology studies  Impression: Left ureteral stone 6mm ( chronic ) no change from 1 month ago  Plan: Dr. Chi discussed fully with patient present situation and will plan for possible definitive care for stone just prior to discharge on this admission.

## 2023-07-11 NOTE — PROGRESS NOTE ADULT - SUBJECTIVE AND OBJECTIVE BOX
*Late note entry    Reason for visit: MISA    Subjective: Got hypotensive yesterday, requiring IVF bolus. Also hypothermia. C/o weakness, HA. Denies urinary complaints.    ROS: All systems were reviewed in detail pertinent positive and negative mentioned above, rest are negative.    PHYSICAL EXAM:  Gen: no acute distress  MS: alert, conversing normally  Eyes: EOMI, no icterus  HENT: NCAT, MMM  CV: rhythm reg reg, rate normal, no m/g/r, no LE edema  Chest: CTAB, no w/r/r,  Abd: soft, NT, ND  Neuro: moving all 4 limbs spontaneously, no tremor  MSK: normal bulk and tone, no joint swelling  Skin: dry, warm, no rash or jaundice    =======================================================  Vital Signs Last 24 Hrs  T(C): 36.5 (11 Jul 2023 19:39), Max: 36.8 (11 Jul 2023 04:58)  T(F): 97.7 (11 Jul 2023 19:39), Max: 98.2 (11 Jul 2023 04:58)  HR: 81 (11 Jul 2023 19:39) (78 - 87)  BP: 102/62 (11 Jul 2023 19:39) (90/62 - 109/65)  BP(mean): 71 (11 Jul 2023 04:58) (71 - 71)  RR: 19 (11 Jul 2023 19:39) (17 - 19)  SpO2: 98% (11 Jul 2023 19:39) (98% - 100%)    Parameters below as of 11 Jul 2023 19:39  Patient On (Oxygen Delivery Method): room air      I&O's Summary    11 Jul 2023 07:01  -  11 Jul 2023 23:41  --------------------------------------------------------  IN: 0 mL / OUT: 500 mL / NET: -500 mL      =======================================================  Current Antibiotics:  piperacillin/tazobactam IVPB.. 3.375 Gram(s) IV Intermittent every 12 hours  valGANciclovir 450 milliGRAM(s) Oral daily    Other medications:  dextrose 5%. 1000 milliLiter(s) IV Continuous <Continuous>  dextrose 5%. 1000 milliLiter(s) IV Continuous <Continuous>  dextrose 50% Injectable 12.5 Gram(s) IV Push once  dextrose 50% Injectable 25 Gram(s) IV Push once  dextrose 50% Injectable 25 Gram(s) IV Push once  folic acid 1 milliGRAM(s) Oral daily  glucagon  Injectable 1 milliGRAM(s) IntraMuscular once  heparin   Injectable 5000 Unit(s) SubCutaneous every 8 hours  insulin glargine Injectable (LANTUS) 14 Unit(s) SubCutaneous at bedtime  insulin lispro (ADMELOG) corrective regimen sliding scale   SubCutaneous at bedtime  insulin lispro (ADMELOG) corrective regimen sliding scale   SubCutaneous three times a day before meals  insulin lispro Injectable (ADMELOG) 8 Unit(s) SubCutaneous three times a day before meals  lactated ringers. 1000 milliLiter(s) IV Continuous <Continuous>  mycophenolate mofetil 500 milliGRAM(s) Oral two times a day  pantoprazole    Tablet 40 milliGRAM(s) Oral before breakfast  predniSONE   Tablet 10 milliGRAM(s) Oral daily  psyllium Powder 1 Packet(s) Oral daily  sertraline 100 milliGRAM(s) Oral daily  sodium bicarbonate 650 milliGRAM(s) Oral three times a day  sodium bicarbonate  Infusion 0.069 mEq/kG/Hr IV Continuous <Continuous>  tamsulosin 0.4 milliGRAM(s) Oral at bedtime    =======================================================  07-11    129<L>  |  96  |  110.3<H>  ----------------------------<  157<H>  4.9   |  13.0<L>  |  5.41<H>    Ca    8.2<L>      11 Jul 2023 17:10  Mg     2.4     07-10    TPro  6.1<L>  /  Alb  3.3  /  TBili  0.5  /  DBili  x   /  AST  43<H>  /  ALT  55<H>  /  AlkPhos  100  07-11    Creatinine: 5.41 mg/dL (07-11-23 @ 17:10)  Creatinine: 5.14 mg/dL (07-11-23 @ 03:24)  Creatinine: 5.32 mg/dL (07-11-23 @ 01:43)  Creatinine: 5.01 mg/dL (07-10-23 @ 15:44)    Urinalysis Basic - ( 11 Jul 2023 17:10 )    Color: x / Appearance: x / SG: x / pH: x  Gluc: 157 mg/dL / Ketone: x  / Bili: x / Urobili: x   Blood: x / Protein: x / Nitrite: x   Leuk Esterase: x / RBC: x / WBC x   Sq Epi: x / Non Sq Epi: x / Bacteria: x      =======================================================
Hospitalist Progress Note    Chief Complaint:  Acute renal failure    SUBJECTIVE / OVERNIGHT EVENTS:  Overnight patient became hypotensive, improved w/ fluids, patient seen at bedside, in NAD, endorsing headache and weakness. Patient denies chest pain, SOB, abd pain, N/V, fever, chills, dysuria or any other complaints. All remainder ROS negative.     MEDICATIONS  (STANDING):  dextrose 5%. 1000 milliLiter(s) (50 mL/Hr) IV Continuous <Continuous>  dextrose 5%. 1000 milliLiter(s) (100 mL/Hr) IV Continuous <Continuous>  dextrose 50% Injectable 25 Gram(s) IV Push once  dextrose 50% Injectable 12.5 Gram(s) IV Push once  dextrose 50% Injectable 25 Gram(s) IV Push once  folic acid 1 milliGRAM(s) Oral daily  glucagon  Injectable 1 milliGRAM(s) IntraMuscular once  heparin   Injectable 5000 Unit(s) SubCutaneous every 8 hours  insulin glargine Injectable (LANTUS) 14 Unit(s) SubCutaneous at bedtime  insulin lispro (ADMELOG) corrective regimen sliding scale   SubCutaneous three times a day before meals  insulin lispro (ADMELOG) corrective regimen sliding scale   SubCutaneous at bedtime  insulin lispro Injectable (ADMELOG) 8 Unit(s) SubCutaneous three times a day before meals  mycophenolate mofetil 500 milliGRAM(s) Oral two times a day  pantoprazole    Tablet 40 milliGRAM(s) Oral before breakfast  piperacillin/tazobactam IVPB.. 3.375 Gram(s) IV Intermittent every 12 hours  predniSONE   Tablet 10 milliGRAM(s) Oral daily  psyllium Powder 1 Packet(s) Oral daily  sertraline 100 milliGRAM(s) Oral daily  sodium bicarbonate  Infusion 0.069 mEq/kG/Hr (100 mL/Hr) IV Continuous <Continuous>  sodium chloride 0.45% 1000 milliLiter(s) (75 mL/Hr) IV Continuous <Continuous>  tamsulosin 0.4 milliGRAM(s) Oral at bedtime  valGANciclovir 450 milliGRAM(s) Oral daily    MEDICATIONS  (PRN):  acetaminophen     Tablet .. 650 milliGRAM(s) Oral every 6 hours PRN Temp greater or equal to 38C (100.4F), Mild Pain (1 - 3)  aluminum hydroxide/magnesium hydroxide/simethicone Suspension 30 milliLiter(s) Oral every 4 hours PRN Dyspepsia  dextrose Oral Gel 15 Gram(s) Oral once PRN Blood Glucose LESS THAN 70 milliGRAM(s)/deciliter  melatonin 3 milliGRAM(s) Oral at bedtime PRN Insomnia  ondansetron Injectable 4 milliGRAM(s) IV Push every 8 hours PRN Nausea and/or Vomiting  senna 2 Tablet(s) Oral at bedtime PRN Constipation        I&O's Summary      PHYSICAL EXAM:  Vital Signs Last 24 Hrs  T(C): 36.4 (11 Jul 2023 10:20), Max: 36.8 (10 Jul 2023 14:33)  T(F): 97.6 (11 Jul 2023 10:20), Max: 98.2 (10 Jul 2023 14:33)  HR: 82 (11 Jul 2023 07:56) (76 - 83)  BP: 109/65 (11 Jul 2023 07:56) (90/62 - 119/66)  BP(mean): 71 (11 Jul 2023 04:58) (71 - 84)  RR: 18 (11 Jul 2023 07:56) (17 - 18)  SpO2: 98% (11 Jul 2023 07:56) (95% - 100%)    Parameters below as of 11 Jul 2023 07:56  Patient On (Oxygen Delivery Method): room air      General: obese, weak, in no acute distress  Eyes: PERRLA, EOMI; conjunctiva and sclera clear  Head: Normocephalic; atraumatic  ENMT: No nasal discharge; airway clear  Neck: Supple; non tender; no masses  Respiratory: No wheezes, rales or rhonchi  Cardiovascular: Regular rate and rhythm. S1 and S2 Normal; No murmurs, gallops or rubs  Gastrointestinal: Soft non-tender, obese, non-distended; Normal bowel sounds  Genitourinary: No costovertebral angle tenderness  Extremities: Normal range of motion, No clubbing, cyanosis or edema  Vascular: Peripheral pulses palpable 2+ bilaterally  Neurological: Alert and oriented x4  Skin: Warm and dry. No acute rash, scab on L scalp 2/2 "cancer removal"  Lymph Nodes: No acute cervical adenopathy  Musculoskeletal: unsteady gait, tone, without deformities  Psychiatric: Cooperative and appropriate    LABS:                        9.0    8.18  )-----------( 186      ( 11 Jul 2023 03:24 )             28.0     07-11    133<L>  |  100  |  106.0<H>  ----------------------------<  170<H>  5.2   |  14.0<L>  |  5.14<H>    Ca    8.2<L>      11 Jul 2023 03:24  Mg     2.4     07-10    TPro  6.1<L>  /  Alb  3.3  /  TBili  0.5  /  DBili  x   /  AST  43<H>  /  ALT  55<H>  /  AlkPhos  100  07-11          Urinalysis Basic - ( 11 Jul 2023 03:24 )    Color: x / Appearance: x / SG: x / pH: x  Gluc: 170 mg/dL / Ketone: x  / Bili: x / Urobili: x   Blood: x / Protein: x / Nitrite: x   Leuk Esterase: x / RBC: x / WBC x   Sq Epi: x / Non Sq Epi: x / Bacteria: x        CAPILLARY BLOOD GLUCOSE      POCT Blood Glucose.: 184 mg/dL (11 Jul 2023 09:19)  POCT Blood Glucose.: 278 mg/dL (10 Jul 2023 21:12)        RADIOLOGY & ADDITIONAL TESTS:  Results Reviewed: Y  Imaging Personally Reviewed: N  Electrocardiogram Personally Reviewed: MASOOD

## 2023-07-11 NOTE — CHART NOTE - NSCHARTNOTEFT_GEN_A_CORE
Called by RN for pt who had BP 78/40, P-77, Temp 97.5 orally, R-18.  Patient c/o being cold, but offers no other complaints  HPI:  68 yo male with PMHx obesity, IDDM, HFpEF, CKD, JEAN cirrhosis s/p liver transplant, VRE bacteremia, RLE debridements s/p R TMA presenting with back pain and generalized weakness. Patient states that for the last 3 days he has been complaining of weakness, R flank and midline LS pain. Was most recently discharged from [facility] on 2023 for cellulitis/OM admission w/ bacteremia. States that for the past few days he has been unable to urinate. Has a hx of a chronic 6mm renal stone in his L kidney w/ hydronephrosis. CT revealing small ascites and pleural effusions. Patient complaining of headache, weakness, dizziness, dry mouth and unsteady gait. Denies CP, SOB, abd pain, hematochezia, rashes, fevers. (10 Jul 2023 17:23)    PAST MEDICAL & SURGICAL HISTORY:  Diabetes  Hypercholesteremia  Neuropathy  Hypertension  Renal stones, 25 years ago  Fatty liver disease, nonalcoholic  Obesity  Hepatic encephalopathy  GERD with esophagitis  Gastritis & Non Bleeding Ulcers  GIB (gastrointestinal bleeding)  S/P cholecystectomy    Height (cm): 185.4 (07-10 @ :33)  Weight (kg): 108.9 (07-10 @ :33)  BMI (kg/m2): 31.7 (07-10 @ :33)  BSA (m2): 2.33 (07-10 @ :33    Current Medication:   acetaminophen     Tablet .. 650 milliGRAM(s) Oral every 6 hours PRN  aluminum hydroxide/magnesium hydroxide/simethicone Suspension 30 milliLiter(s) Oral every 4 hours PRN  dextrose 5%. 1000 milliLiter(s) IV Continuous <Continuous>  dextrose 5%. 1000 milliLiter(s) IV Continuous <Continuous>  dextrose 50% Injectable 25 Gram(s) IV Push once  dextrose 50% Injectable 12.5 Gram(s) IV Push once  dextrose 50% Injectable 25 Gram(s) IV Push once  dextrose Oral Gel 15 Gram(s) Oral once PRN  folic acid 1 milliGRAM(s) Oral daily  glucagon  Injectable 1 milliGRAM(s) IntraMuscular once  heparin   Injectable 5000 Unit(s) SubCutaneous every 8 hours  insulin glargine Injectable (LANTUS) 14 Unit(s) SubCutaneous at bedtime  insulin lispro (ADMELOG) corrective regimen sliding scale   SubCutaneous at bedtime  insulin lispro (ADMELOG) corrective regimen sliding scale   SubCutaneous three times a day before meals  insulin lispro Injectable (ADMELOG) 8 Unit(s) SubCutaneous three times a day before meals  melatonin 3 milliGRAM(s) Oral at bedtime PRN  mycophenolate mofetil 500 milliGRAM(s) Oral two times a day  ondansetron Injectable 4 milliGRAM(s) IV Push every 8 hours PRN  pantoprazole    Tablet 40 milliGRAM(s) Oral before breakfast  piperacillin/tazobactam IVPB.. 3.375 Gram(s) IV Intermittent every 12 hours  predniSONE   Tablet 10 milliGRAM(s) Oral daily  psyllium Powder 1 Packet(s) Oral daily  senna 2 Tablet(s) Oral at bedtime PRN  sertraline 100 milliGRAM(s) Oral daily  sodium bicarbonate  Infusion 0.069 mEq/kG/Hr IV Continuous <Continuous>  tamsulosin 0.4 milliGRAM(s) Oral at bedtime  valGANciclovir 450 milliGRAM(s) Oral daily      On examination:  Vital Signs Last 24 Hrs  T(C): 36.4 (10 Jul 2023 23:10), Max: 36.8 (10 Jul 2023 14:33)  T(F): 97.5 (10 Jul 2023 23:10), Max: 98.2 (10 Jul 2023 14:33)  HR: 78 (10 Jul 2023 23:10) (76 - 83)  BP: 119/66 (10 Jul 2023 18:21) (102/53 - 119/66)  BP(mean): 84 (10 Jul 2023 18:21) (84 - 84)  RR: 18 (10 Jul 2023 23:10) (18 - 18)  SpO2: 99% (10 Jul 2023 23:10) (95% - 99%)    Parameters below as of 10 Jul 2023 23:10  Patient On (Oxygen Delivery Method): room air    Pt is awake, alert, oriented to person, place, time and situation in NAD  Lungs- clear bilaterally to auscultation without rales/rhonchi/wheezes  Heart- s1s2 heard, KAE heard at RSB  Extremities- capillary refill  > 3 seconds; cool to touch; Right foot TMA    Lactate 2.4                        9.9    8.52  )-----------( 216      ( 10 Jul 2023 15:44 )             30.8     10 Jul 2023 15:44    131    |  96     |  109.1  ----------------------------<  303    5.1     |  12.0   |  5.01     Ca    8.7        10 Jul 2023 15:44  Mg     2.4       10 Jul 2023 15:44    TPro  6.9    /  Alb  3.6    /  TBili  0.5    /  DBili  x      /  AST  38     /  ALT  48     /  AlkPhos  114    10 Jul 2023 15:44    LIVER FUNCTIONS - ( 10 Jul 2023 15:44 )  Alb: 3.6 g/dL / Pro: 6.9 g/dL / ALK PHOS: 114 U/L / ALT: 48 U/L / AST: 38 U/L / GGT: x           POCT Blood Glucose.: 278 mg/dL (10 Jul 2023 21:12)    Urinalysis Basic - ( 10 Jul 2023 17:35 )  Color: Yellow / Appearance: very cloudy / S.025 / pH: x  Gluc: x / Ketone: Negative  / Bili: Negative / Urobili: Negative mg/dL   Blood: x / Protein: 100 / Nitrite: Negative   Leuk Esterase: Moderate / RBC: 3-5 /HPF / WBC >50 /HPF   Sq Epi: x / Non Sq Epi: x / Bacteria: Few    Blood cultures drawn- pending results        Impression: 1- metabolic acidosis in setting of acute renal failure                    2- hypotension                    3- hyothermia                    4- HFpEF                    5- NAFLD (s/p liver transplant)  Plan: 1- NS bolus 1000ml stat          2- hypothermia blanket          3- CMP ordered- awaiting results          4- case d/w Dr. Yost, in agreement with plan of care

## 2023-07-11 NOTE — PROGRESS NOTE ADULT - ASSESSMENT
67M PMHx IDDM, HFpEF, CKD, JEAN cirrhosis s/p liver transplant, VRE bacteremia, RLE debridements s/p R TMA presenting with back pain and generalized weakness. Recent admission w/ corynebactrium bacteremia (unclear source, rt foot OM, left ureteral stone) was given course of vancomycin. Nephrology consulted for MISA    1. Acute kidney injury on CKD III:  -MISA unclear, possibly related to UTI, DDX AIN    -Baseline SCr:  around 1.8- 2.2, SCr 5.01 mg/dl on presentation   -UA: cloudy, pr 100, mod LE and large blood.  -Imaging:  chronic 6mm renal stone in his L kidney w/ hydronephrosis, no right HDN.    -Was started on IVF, will c/w bicarb containing IVF  -Inserted Beasley, Flomax  -UCx NGTD  -Low bicarb but maintaining K balance  -no emergent need for RRT today but patient w/ no improvement in SCr.   -Discussed lab findings w/ the patient and primed him of the possibility of requiring RRT in near future.   -Will also order some immunologies as r/o    2. Metabolic acidosis: IV w/ bicarb as above and maintain MAP > 65.     3. Hyponatremia: IVF as above    4. OLT status: IS per GI/primary. Cellcept, prednisone and valganciclovir.    5. Anemia of chr disease: transfuse for Hb <7.    6. Nephrolithiasis: appreciate urology input.    D/w Dr. Watson.

## 2023-07-11 NOTE — PROGRESS NOTE ADULT - ASSESSMENT
66 yo male with PMHx obesity, IDDM, HFpEF, CKD, JEAN cirrhosis s/p liver transplant, VRE bacteremia, RLE debridements s/p R TMA here for acute renal failure    #Sepsis w/o obvious source  chronic stone possible source of infection?  - f/u cultures  - c/w zosyn and vanc by level  - ID consulted  - jeanmarie for hypothermia  - c/w IV Bicarb Drip, bolus w/ IVLR as needed  - bob in place for strict I/Os    #Acute renal failure  #Chronic Renal stone L Kidney  #BPH  Hx of CKD, baseline Cr 2.2  Now >5  recently treated for extended period of time at [facility] for bacteremia and cellulitis vs OM  Poor oral intake for 3 days  TTE from June 29 2023 at [facility] denotes normal LV function, no quantitative EF provided  Chronic 6mm stone in L kidney  Lactic acidosis  - c/w Bicarb Drip  - Bob to be placed in ED, strict I/Os  - PO diet  - Nephro on board - Likely pre-renal, monitor w/ bob and IV Hydration  - flomax  - trend lactate  - f/u cultures  - f/u nephro recs  - call urology for chronic stone    #JEAN Cirrhosis s/p Liver Transplant  transplanted several years ago  on cellcept and chronic prednisone  - monitor LFTs  - c/w cellcept, prednisone and valgancyclovir  - caution w/ hepatotoxic meds    #Anemia  likely 2/2 chronic disease  - monitor cbc  - maintain H/H >7    #DM2 w/ hyperglycemia  - c/w lantus and Premeal Insulin  - ISS and FSG QAC and HS  - Consistent Carb Diet    #Healthcare Maintenance  DVT PPx - HSQ  Dispo - Acute    Attempted to call wife, number in chart is incorrect

## 2023-07-12 NOTE — ED PROCEDURE NOTE - PROCEDURE ADDITIONAL DETAILS
Rapid response called for acute change in mental status. Pt obtunded and then lost pulses. CPR initiated and pt emergently intubated.

## 2023-07-12 NOTE — BEHAVIORAL HEALTH ASSESSMENT NOTE - SUICIDE PROTECTIVE FACTORS
Takes Lexapro 10mg PO daily at home. Likely 2/2 difficulty dealing with advanced cancer dx  - c/w Lexapro 10mg starting tomorrow AM
Supportive social network of family or friends/Ability to cope with stress/Responsibility to family and others/Identifies reasons for living

## 2023-07-12 NOTE — DISCHARGE NOTE FOR THE EXPIRED PATIENT - OTHER SIGNIFICANT FINDINGS
On 23 @04:04, RR called initially for bradycardia/ unresponsiveness.  Upon RRT arrival, pt was found apneic w/o pulse, CODE BLUE initiated @04:06  Dr. Crhis, ER physician, by pt's bedside, intubated @04:09  @04:15 called pt's wife Ada Segundo who wished to have pt FULLY CODED  Code carried out for more than 15 minutes with only very brief period of ROSC without detectable BP before recurrent cardiac arrest. Code eventually stopped without objection. Patient pronounced dead @ 0422 by ED physician.  Spoke with Ada Ratna @ 04:23, made aware of  status.  Very emotional, support given.   On 23 @04:04, RR called initially for bradycardia/ unresponsiveness.  Upon RRT arrival, pt was found apneic w/o pulse, CODE BLUE initiated @04:06  Dr. Chris, ER physician, by pt's bedside, intubated @04:09  @04:15 called pt's wife Ada Segundo who wished to have pt FULLY CODED  Code carried out for more than 15 minutes with only very brief period of ROSC without detectable BP before recurrent cardiac arrest. Code eventually stopped without objection. Patient pronounced dead @ 0422 by ED physician.  Spoke with Ada Segundo @ 04:23, made aware of  status.  Very emotional, support given.    Attending to fill out death certificate

## 2023-07-12 NOTE — DISCHARGE NOTE FOR THE EXPIRED PATIENT - HOSPITAL COURSE
67M PMHx IDDM, HFpEF, CKD, JEAN cirrhosis s/p liver transplant, VRE bacteremia, RLE debridements s/p R TMA presenting with back pain and generalized weakness. Recent admission w/ corynebactrium bacteremia (unclear source, rt foot OM, left ureteral stone) was given course of vancomycin. Nephrology consulted for MISA.  Treated for UTI w/ zosyn.  Was hypotensive improved w/ IVF bolus.

## 2023-07-12 NOTE — CHART NOTE - NSCHARTNOTEFT_GEN_A_CORE
Responded to Code Blue in CDU. CPR/ACLS underway for PEA cardiac arrest. Code run by ED and medicine teams. Code carried out for 20+ minutes with only very brief period of ROSC without detectable BP before recurrent cardiac arrest. Code eventually stopped without objection. Patient pronounced dead @ 0422 by ED physician.

## 2023-07-12 NOTE — RAPID RESPONSE TEAM SUMMARY - NSSITUATIONBACKGROUNDRRT_GEN_ALL_CORE
66 yo male with PMHx obesity, IDDM, HFpEF, CKD, JEAN cirrhosis s/p liver transplant, VRE bacteremia, RLE debridements s/p R TMA admitted for acute renal failure.  Was treated for UTI w/ Zosyn.  Also became hypotensive requiring IVF bolus.

## 2023-07-12 NOTE — ED PROCEDURE NOTE - CPROC ED TIME OUT STATEMENT1
Patient's name, , procedure and correct site were confirmed during the [place] Timeout.”

## 2023-07-12 NOTE — RAPID RESPONSE TEAM SUMMARY - NSADDTLFINDINGSRRT_GEN_ALL_CORE
RR was initiated due to pt peyman ing on cardiac monitor; was unresponsive on assessment.  Upon RRT arrival, pt was found apneic w/o pulse @04:06, CODE BLUE was called.  Dr. Chris, ER physician, by pt's bedside, intubated @04:09  @04:15 called pt's wife Ada Segundo who wished to have pt FULLY CODED  Code carried out for more than 15 minutes with only very brief period of ROSC without detectable BP before recurrent cardiac arrest. Code eventually stopped without objection. Patient pronounced dead @ 0422 by ED physician.  Spoke with Ada Segundo @ 04:23, made aware of  status.  Very emotional, support given.

## 2023-07-13 LAB
ANA TITR SER: NEGATIVE — SIGNIFICANT CHANGE UP
AUTO DIFF PNL BLD: NEGATIVE — SIGNIFICANT CHANGE UP
C-ANCA SER-ACNC: NEGATIVE — SIGNIFICANT CHANGE UP
GBM IGG SER-ACNC: <0.2 — SIGNIFICANT CHANGE UP (ref 0–0.9)
MPO AB + PR3 PNL SER: SIGNIFICANT CHANGE UP
P-ANCA SER-ACNC: NEGATIVE — SIGNIFICANT CHANGE UP

## 2023-07-17 LAB
% ALBUMIN: 51.7 % — SIGNIFICANT CHANGE UP
% ALPHA 1: 6.9 % — SIGNIFICANT CHANGE UP
% ALPHA 2: 11.7 % — SIGNIFICANT CHANGE UP
% BETA: 13.8 % — SIGNIFICANT CHANGE UP
% GAMMA: 15.9 % — SIGNIFICANT CHANGE UP
% M SPIKE: SIGNIFICANT CHANGE UP
ALBUMIN SERPL ELPH-MCNC: 3.1 G/DL — LOW (ref 3.6–5.5)
ALBUMIN/GLOB SERPL ELPH: 1.1 RATIO — SIGNIFICANT CHANGE UP
ALPHA1 GLOB SERPL ELPH-MCNC: 0.4 G/DL — SIGNIFICANT CHANGE UP (ref 0.1–0.4)
ALPHA2 GLOB SERPL ELPH-MCNC: 0.7 G/DL — SIGNIFICANT CHANGE UP (ref 0.5–1)
B-GLOBULIN SERPL ELPH-MCNC: 0.8 G/DL — SIGNIFICANT CHANGE UP (ref 0.5–1)
GAMMA GLOBULIN: 0.9 G/DL — SIGNIFICANT CHANGE UP (ref 0.6–1.6)
INTERPRETATION SERPL IFE-IMP: SIGNIFICANT CHANGE UP
M-SPIKE: SIGNIFICANT CHANGE UP (ref 0–0)
PROT PATTERN SERPL ELPH-IMP: SIGNIFICANT CHANGE UP

## 2023-07-19 ENCOUNTER — APPOINTMENT (OUTPATIENT)
Dept: INFECTIOUS DISEASE | Facility: CLINIC | Age: 68
End: 2023-07-19

## 2023-07-24 PROBLEM — E11.9 DIABETES MELLITUS TYPE 2, INSULIN DEPENDENT: Status: ACTIVE | Noted: 2020-05-26

## 2023-07-24 PROBLEM — E11.621 DIABETIC ULCER OF RIGHT FOOT: Status: ACTIVE | Noted: 2023-01-01

## 2023-07-24 PROBLEM — M79.89 LEG SWELLING: Status: ACTIVE | Noted: 2019-12-26

## 2023-07-24 NOTE — PHYSICAL EXAM
[0] : left 0 [2+] : left 2+ [Please See PDF for [employer]] : Please See PDF for [employer]. [de-identified] : s/p right foot partial hallux and partial second ray resections. BL midtarsal joint 13, patella orientation central, ankle dorsiflexion 12 degrees, ankle plantarflexion 12 degrees, STJ supination 15 degrees, STJ pronation 9 degrees, hallux dorsiflexion 55 degrees, hallux plantarflexion 35 degrees, no toe contractures noted. [de-identified] : Right foot second toe incision site sutures intact, no hematoma formation, no fluctuance, skin edges well coapted, flaps warm and viable, no clinical signs of infection. Right foot lateral fifth metatarsal head eschar with periwound erythema, no excess heat, no drainage, no purulence. Right foot hallux prior incision site hyperkeratosis with no clinical signs of infection. Left foot no wounds, no signs of infection. [de-identified] : Bilateral sensation diminished to the level of the toes.

## 2023-07-24 NOTE — ASSESSMENT
[FreeTextEntry1] : 66M presents s/p right foot tma and lateral wound\par - Pt was seen and evaluated. \par - Right foot lateral 5th met wound debrided to sub q and not beyond with dermal currette with erik dsd applied today\par - Clean Bone Margin Culture: No growth.\par - Clean Bone Margin Pathology: No osteomyelitis..\par - Pt to ED if N/C/V/SOB symptoms presents, verbal understanding demontrated\par \par Patient read the consent and signed it prior to the initiation of any screening procedure. Patient had the opportunity to discuss any questions regarding the study. I witnessed the signing of the consent and the patient was given a copy of the signed consent.\par - RTC in 2 weeks,  --cotninue with topical e02

## 2023-08-14 NOTE — PATIENT PROFILE ADULT - NSPROMUTINFOINDIVIDFT_GEN_A_NUR
no Aklief counseling:  Patient advised to apply a pea-sized amount only at bedtime and wait 30 minutes after washing their face before applying.  If too drying, patient may add a non-comedogenic moisturizer.  The most commonly reported side effects including irritation, redness, scaling, dryness, stinging, burning, itching, and increased risk of sunburn.  The patient verbalized understanding of the proper use and possible adverse effects of retinoids.  All of the patient's questions and concerns were addressed.

## 2023-08-14 NOTE — ED PROVIDER NOTE - NS ED MD TWO NIGHTS YN
Yes Hypertriglyceridemia Monitoring: I explained this is common when taking isotretinoin. We will monitor closely.

## 2023-08-21 NOTE — HISTORY OF PRESENT ILLNESS
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 \par \par \par Labs from 10/27/2020 TB 0.2 AST 21, ALT 19,  Cr 1.35 Everolimus 6.1, WBC 2.70 \par \par Immunosuppression: Everolimus 4mg BID, MMF 500mg BID, Prednisone 5mg daily. \par On valcyte 900mg daily and Bactrim SS daily, Lasix 10mg daily. [FreeTextEntry1] : Ms. ACOSTA is here for an annual physical examination and assessment. [de-identified] : She generally feels well with no specific complaints. Her recent health has been good.  Denies headache, dizziness. Denies rash, fatigue, fever, weight loss, anorexia. Denies cough, wheezing. Denies CP, SOB, KING, edema, palpitations. Denies abdominal pain, N/V/D/C. Denies BRBPR, melena, dysphagia. Denies dysuria, frequency, hematuria, hesitancy. Denies weakness, numbness, gait instability.

## 2023-08-24 NOTE — ED ADULT NURSE NOTE - NSFALLRSKINDICATORS_ED_ALL_ED
no Soolantra Pregnancy And Lactation Text: This medication is Pregnancy Category C. This medication is considered safe during breast feeding.

## 2023-09-02 NOTE — DISCHARGE NOTE NURSING/CASE MANAGEMENT/SOCIAL WORK - NSDCPEPT PROEDMA_GEN_ALL_CORE
10:35 PM Recheck on patient. Discussed with patient ED findings and plan for discharge. Patient was given ED warnings, discharge instructions, and follow up information to go home with. Patient understands and agrees with plan for discharge. Any questions have been answered.     Yes

## 2023-09-11 NOTE — PHYSICAL THERAPY INITIAL EVALUATION ADULT - IMPAIRED TRANSFERS: SIT/STAND, REHAB EVAL
SASHA:Patient feels much improved and states his vision is back to baseline.  Has no complaints at this time.  Will discharge home with outpatient follow-up with ophthalmology and neurology.  Supportive care and return precautions advised.  Patient comfortable with plan  \  Patient to be discharged from ED. Any available test results were discussed with patient and/or family. Verbal instructions given, including instructions to return to ED immediately for any new, worsening, or concerning symptoms. Patient endorsed understanding. Written discharge instructions additionally given, including follow-up plan.
decreased strength/impaired postural control/impaired balance/decreased endurance/cognition/decreased ROM
impaired coordination/impaired balance/decreased strength/impaired postural control

## 2023-09-17 NOTE — PHARMACOTHERAPY INTERVENTION NOTE - COMMENTS
63 yo M with DM A1C 5.8 on lantus 40 units subcutaneously at bedtime at home. Maintained euglycemina with lantus 20 units subcutaneously at bedtime while inpatient, but has been persistently in the 200s over the weekend. Recommendation made to add humalog 4 units subcutaneously three times daily with meals.    Luis Cardenas, PharmD  [phone] - - -

## 2023-09-21 NOTE — PROGRESS NOTE ADULT - ASSESSMENT
Nutrition Assessment   Reason for Consult/Assessment: Initial, Nursing nutrition screen (MST)      Diagnosis and Hx: Reviewed    Pertinent Nutrition History: Significant for benign esophageal stricture, gastric outlet obstruction, gastroesophageal reflux disease and high cholesterol. Surgical history of Trish-en-Y gastric bypass. Recent admission in May 2023 for SI with drug overdose.    Pertinent Nutrition Information: Admission for SI with plans to overdose and medication non-compliance for 3 weeks. % PO intake. Review of weight history reveals ~2 kg weight loss since July 2023. Pertinent medications include ferrous sulfate, folic acid and vitamin D. Labs reviewed.                                 Diet Order: Regular                  Diet tolerance: Tolerating with good appetite/intakes recorded   Food Allergies: None known    Demographic/Anthropometrics Information  Gender: female  Patient Age: 55 year old  Height:   Ht Readings from Last 1 Encounters:   09/20/23 5' 1\" (1.549 m)      Weight:   Wt Readings from Last 1 Encounters:   09/20/23 90.9 kg      BMI:   BMI Readings from Last 1 Encounters:   09/20/23 37.89 kg/m²          % Weight Change: -2 kg (2%) x 2 mo  Weight change significant: No  Reason for weight change: Decreased intake     Estimated Needs:  Calculated Energy Needs: 8059-0364  kcal               Calculated protein needs: 48-57  g    Calculated Fluid Needs: 1ml/kcal              NFPE  Nutrition Focused Physical Exam  Physical Exam Completed: No                      TREATMENT PLAN: Monitoring & Interventions   Intervention: Meals and snacks         Meals & snacks: Regular Diet                                                                   Goal: Meet >/equal 75% estimated needs   Intervention goal status: Initiated  Time frame to achieve goal: 5-7 days     Dietitian will monitor: Anthropometric Measurements, Food, beverage, and nutrient intake, Biochemical data, medical tests, procedures             Nutrition Diagnosis / PES  Nutrition diagnosis at this time: No nutrition diagnosis at this time                                      65 year old man type 2 diabetes, HLD, GERD, HFpEF with mild LV diastolic dysfunction, and decompensated JEAN cirrhosis, duodenal ulcer, GAVE and duodenal AVM s/p APC (last on 10/11/19) and SHENG who is now s/p liver transplant (7/2/20) with post-op course c/b tacrolimus toxicity and hospital delirium improving after stopping CNI and replacing w/ everolimus. Patient recently presented 11/20/20 with worsening LE swelling and right foot wound with wound culture 11/24 growing S. epidermidis and calcaneus bone biopsy culture 11/24 demonstrating osteomyelitis sent home with cefepime. Patient now with sepsis and MISA likely secondary to cdiff. Patient having low grade fever.      Impression:   # Fever: afebrile yesterday. CT scan unrevealing for source. Possible secondary to AIN. Underlying infection (bladder source?) is on differential. Has cdiff which is resolving. Possible osteo though low suspicion from ID and podiatry teams as patient was on targeted antibiotics with clinical appearance of a healing wound.  # Cdiff: on po vanco since 12/15. Continues to improved from symptom standpoint. WBC wnl. No fever in 24 hours.  # MISA with c/f uremia: Cr is plateauing. Making adequate urine. CT does show anasarca likely secondary to MISA. Remains nonoliguric. Now on intermittent dialysis, no dialysis in 2 days. Kidney slightly enlarged on u/s with labs and clinical picture consistent with AIN although cannot exclude acute tubular necrosis. FeNa and FeUrea suggest possible AIN (from cefepime)  # Right foot osteomyelitis:  reviewed by podiatry, appears stable. s/p surgical debridement and graft placement on 11/24 by Podiatry. Wound culture grew pseudomonas and bone bx on 11/24 growing S. epidermidis - likely contaminant. Had PICC line, now removed. On meropenem  # Otitis externa: on floxin drops  # S/p liver transplant 7/2/20: Transaminases and INR mildly elevated likely in setting of infection.   Recipient Info:   ABO: A  CMV:  Neg  EBV Positive  Donor:  Donor ID:  [id number] Match: [id number]  Age: 29 ABO:  A1 High Risk:   No COD: Cardiovascular  Anti CMV positive, EBV IgG- positive  HepBcAb-neg, Hepatitis C-DANA- neg, Hepatitis C ab-neg  # Fever - unclear etiology, infectious workup ongoing.  # Bladder wall thickening  # Type 2 diabetes  # SHENG     Recommendations:  - give one dose of lasix 40mg IV today and monitor urine output/Cr.  - continue with po vanco, meropenum, and floxin drops per ENT  - strict glycemic control, increase insulin per primary transplant team  - strict I/Os  - immunosuppressives with everolimus and prednisone  - appreciate transplant ID, renal, surgery reccs  - hold bactrim given MISA  - monitor CBC, CMP, INR  - monitor fever curve  - transplant hepatology to follow

## 2023-11-06 NOTE — ED ADULT NURSE NOTE - EXTENSIONS OF SELF_ADULT
November 6, 2023        Stu Almanzar  [address] [place] [address]          Dear Stu Almanzar:    You were seen in the [facility] at [facility] EMERGENCY DEPARTMENT on 11/5/2023.  We are unable to reach you by phone, so we are sending you this letter.     It is important that you call [facility] lab result nurse at [phone], as we have information to relay to you AND/OR we MAY have to make some changes in your treatment.    Best time to call back is between 9AM and 5:30PM, 7 days a week.      Sincerely,     [facility] Lab Result RN  [phone]  
None

## 2023-12-04 NOTE — PROGRESS NOTE ADULT - PROVIDER SPECIALTY LIST ADULT
1st attempt  Lm to cb with % improvement and pain level.     Lt hip injection 11/27, F/u 1/9   Gastroenterology

## 2023-12-08 NOTE — PROGRESS NOTE ADULT - PROBLEM SELECTOR PLAN 2
Meal tray ordered at this time.       Gaudencio Martinez RN  12/08/23 7422 Endo eval appreciated.  Per Endo: Clinically no evidence of adrenal insufficiency. Low cortisol level in this case most likely due to low CBG from cirrhosis.  awaiting repeat ACTH and Cortisol. awaiting CBG level as well.

## 2024-01-18 NOTE — ED CLERICAL - NS ED CLERK UNITS
APER
Instructions: This plan will send the code FBSE to the PM system.  DO NOT or CHANGE the price.
Price (Do Not Change): 0.00
Detail Level: Simple

## 2024-02-01 NOTE — PROGRESS NOTE ADULT - PROBLEM SELECTOR PROBLEM 7
"Landen Woodard presents to [facility] who presents to the clinic for 6-month follow-up.      History of Present Illness  This is a 22-year-old male who presents to the clinic for 6-month follow-up.    RAMA: Patient states that he is doing much better since has been started on his sleep apnea CPAP machine.  Patient states that sometimes he will wake up in the middle the night, ripped the mask off, but there are also nights that he will get 8 to 9 hours of continuous sleep.  Patient states that he feels a lot better, his energy levels are better the next day, and he is really pleased with the results he has gotten from this.  Continues to follow-up with sleep medicine routinely.  No other new issues with this.    PAC: Patient did have an episode back almost a year ago now from a syncopal episode.  Is really unsure what happened, but did go and was evaluated via cardiology, was determined that he had PACs, did do a Holter test and some other testing.  He has followed up with cardiology routinely.  Not currently on medications.  Has not had any more events or issues.  Denies chest pain or shortness of breath.    Up-to-date on other preventative screenings.    The following portions of the patient's history were personally reviewed and updated as appropriate: allergies, current medications, past medical history, past surgical history, past family history, and past social history.       Objective   Vital Signs:   /77   Pulse 75   Temp 97.8 °F (36.6 °C)   Resp 18   Ht 165.1 cm (65\")   Wt 112 kg (246 lb 12.8 oz)   SpO2 98%   BMI 41.07 kg/m²     Body mass index is 41.07 kg/m².    All labs, imaging, test results, and specialty provider notes reviewed with patient.     Physical Exam  Vitals reviewed.   Constitutional:       Appearance: Normal appearance.   Cardiovascular:      Rate and Rhythm: Normal rate and regular rhythm.      Pulses: Normal pulses.      Heart sounds: Normal " heart sounds.   Pulmonary:      Effort: Pulmonary effort is normal.      Breath sounds: Normal breath sounds.   Neurological:      General: No focal deficit present.      Mental Status: He is alert and oriented to person, place, and time.                Assessment and Plan:  Diagnoses and all orders for this visit:    1. RAMA on CPAP (Primary)  Comments:  Stable and improved on CPAP machine via sleep medicine.  Continue.    2. PAC (premature atrial contraction)  Comments:  Asymptomatic, continue to monitor via cardiology.  Symptoms return or worsen, further workup.          Follow Up:  Return in about 1 year (around 2/1/2025) for Annual physical.    Patient was given instructions and counseling regarding his condition or for health maintenance advice. Please see specific information pulled into the AVS if appropriate.        Need for prophylactic measure

## 2024-02-07 NOTE — PROVIDER CONTACT NOTE (CRITICAL VALUE NOTIFICATION) - ASSESSMENT
Patient seen and examined in dialysis unit.  Patient complaining of generalized chest and abdominal pain.  Not the best historian but exam unremarkable.  Her imaging shows bibasilar ground-glass opacities versus nodularity concerning for infectious process versus developing edema.  Patient currently afebrile and no evidence for infection for now.  We will hold on antibiotics for now.  Will Get a procalcitonin and continue to monitor.  Blood pressure better controlled now as well.  
Pt A&O x4 no signs of bleeding.
Pt A+Ox4, VSS, denies CP SOB or discomfort at this time, pt continues IV decadron

## 2024-02-11 NOTE — H&P ADULT - PROBLEM/PLAN-5
Bed: 10  Expected date:   Expected time:   Means of arrival:   Comments:  Medical clearance  
DISPLAY PLAN FREE TEXT

## 2024-02-29 NOTE — ED PROVIDER NOTE - IV ALTEPLASE DOOR HIDDEN
Group Topic: Coping Skills   Group Date: 2/28/2024  Start Time: 10:00 AM  End Time: 11:00 AM  Facilitators: GAB Pascal; GAB Pham   [facility]    Number of Participants: 5   Group Focus: activities of daily living skills, goals, and self-awareness  Treatment Modality: Behavior Modification Therapy, Cognitive Behavioral Therapy, Patient-Centered Therapy, and Psychoeducation  Interventions utilized were exploration, group exercise, and patient education  Purpose: insight or knowledge, self-care, relapse prevention strategies, and trigger / craving management    Name: Hussain Villafana YOB: 1989   MR: [id number]    This session was conducted via HIPAA compliant video. Patient was provided with informed consent.  Date: 2/28/24  Time: 10:00 am to 11:00 am  Group Members: 5  Number of Staff: 2  [email or web address]    Group Topic: Psychoeducation of [place] and DiClemente's Stages of Change. Group members were provided a handout about the stages of change, examples were reviewed and group members identified personal behaviors they have attempted to modify in the past.    Patient was present on camera. He was attentive and supportive as others shared personal habits they have attempted to change in the past. He shared planning to incorporate this information to help him practice healthy self-care behaviors.     Primary Facilitator - Mely Castorena, GAB-S, [facility]  Secondary Facilitator - Preston Menjivar, CT  Supervisor Amy Juarez, VANGIE, [facility]  
Group Topic: Coping Skills   Group Date: 2/28/2024  Start Time: 11:00 AM  End Time: 12:00 PM  Facilitators: GAB Pascal; GAB hPam   [facility]    Number of Participants: 6   Group Focus: coping skills, feeling awareness/expression, and goals  Treatment Modality: Behavior Modification Therapy, Cognitive Behavioral Therapy, and Patient-Centered Therapy  Interventions utilized were exploration, group exercise, and patient education  Purpose: coping skills, self-care, relapse prevention strategies, and trigger / craving management    Name: Hussain Villafana YOB: 1989   MR: [id number]      This session was conducted via HIPAA compliant video. Patient was provided with informed consent.  Date: 2/28/24  Time: 11:00 am to 12:00 pm  Group Members: 6  Number of Staff: 2  [email or web address]    Group Topic: Group members continued to learn about The Stages of Change and completed a Behavior Change Worksheet to help guide them with a specific behavior they would like to modify.    Patient was present on camera. He was observed writing during the exercise. He was attentive as others shared behaviors they plan to modify and how to do so. He also shared he plans to use this information to help him make healthy behavior modifications in the near future.     Primary Facilitator - GAB Wolf-S, [facility]  Secondary Facilitator - DALI Schmidt  Supervisor Amy Juarez, VANGIE, [facility]-S    
Group Topic: Feeling Awareness/Expression   Group Date: 2/28/2024  Start Time:  9:00 AM  End Time: 10:00 AM  Facilitators: GAB Pham; GAB Pascal   [facility]    This was a video IOP group on a HIPAA compliant program. All patients were provided with informed consent.     Date: 2/28/2024, Time: 9-10a, Number of Patients: 5, Username: [email or web address], Number of Staff: 2  Group Topic(s): recovery. The group engaged in exploring the concept of recovery and discussing the idea of the saw tooth pattern of recovery. Individually the group reflected on their current progress in IOP and the process thus far as well as what changes they've started to notice. The group also reflected on what they hope to continue to work on/see improve as well as an actionable step to take today to continue their mental health journey. Group discussion followed.     Name: Hussain Villafana YOB: 1989   MR: [id number]      Kirby was present and attentive. Patient met with clinical intern to discuss progress in IOP. Patient maintained nonverbal engagement with group exercise and discussion.   GAB Reed    Secondary facilitator: DALI Schmidt  CT supervised by GAB Fajardo-S, ATR    
show

## 2024-03-06 NOTE — ED ADULT NURSE NOTE - CAS EDN DISCHARGE ASSESSMENT
Alert and oriented to person, place and time

## 2024-03-16 NOTE — DISCHARGE NOTE PROVIDER - ATTENDING ATTESTATION STATEMENT
I have personally seen and examined the patient. I have collaborated with and supervised the (V5) oriented

## 2024-03-26 NOTE — PROGRESS NOTE ADULT - PROBLEM SELECTOR PLAN 2
Cirrhotic patient with recurrent Hgb drop.   monitor hb closely    - varices: grade I <5mm varices on EGD 12/30 not amenable to banding; also mild GAVE s/p APC
f/u hb closely   transfuse prn
good balance
f/u hb closely   transfuse prn

## 2024-04-09 NOTE — DISCHARGE NOTE PROVIDER - NSDCQMSTROKE_NEU_ALL_CORE
[facility]                                     Neurology at [place]                                 Allegra Sweet, & Xu                                  [address]                                        [place], [place], [address]                                             [phone]    CC: diplopia dizziness, leg weakness/numbness/pain and urine retention  HPI:  The patient is a 26y Male who presented with complaints of dizziness for several days, leg weakness/numbness and pain for several weeks and urine retention for 30 hours, needing schaffer catheter.  He had gastric sleeve done in February and has had poor po intake since the surgery.  He is not taking hois vitamins post gastric sleeve surgery, but has had two injections of B12. He went to [facility] for symptoms related to his gastric sleeve and was discharged two days ago with c/o dizziness-mostly lightheaded  He also c/o b/l leg weakness/numbness worse on left than right.  He has been retaining urine.  He c/o diplopia, not resolved with lateral gaze on either side, improved with single eye closure.  Neurology is asked to evaluate.    Interval history: still with diplopia, nausea, vomiting, leg numbness    Review of systems (neurology): (+) dizziness. (+) weakness/numbness.  Denies speech/language deficits. (+) diplopia/blurred vision.  Denies confusion    MEDICATIONS  (STANDING):  aspirin enteric coated 81 milliGRAM(s) Oral daily  atorvastatin 40 milliGRAM(s) Oral at bedtime  cyanocobalamin 1000 MICROGram(s) Oral daily  enoxaparin Injectable 40 milliGRAM(s) SubCutaneous every 12 hours  folic acid 1 milliGRAM(s) Oral daily  ondansetron Injectable 4 milliGRAM(s) IV Push once  pantoprazole  Injectable 40 milliGRAM(s) IV Push two times a day  sodium chloride 0.9%. 1000 milliLiter(s) (85 mL/Hr) IV Continuous <Continuous>  tamsulosin 0.4 milliGRAM(s) Oral at bedtime    MEDICATIONS  (PRN):  acetaminophen     Tablet .. 650 milliGRAM(s) Oral every 6 hours PRN Temp greater or equal to 38C (100.4F), Mild Pain (1 - 3)  aluminum hydroxide/magnesium hydroxide/simethicone Suspension 30 milliLiter(s) Oral every 4 hours PRN Dyspepsia  meclizine 12.5 milliGRAM(s) Oral three times a day PRN Dizziness  melatonin 3 milliGRAM(s) Oral at bedtime PRN Insomnia  metoprolol tartrate Injectable 5 milliGRAM(s) IV Push every 8 hours PRN hr over 120  ondansetron Injectable 4 milliGRAM(s) IV Push every 8 hours PRN Nausea and/or Vomiting      Vital Signs Last 24 Hrs  T(C): 36.3 (09 Apr 2024 07:54), Max: 36.6 (08 Apr 2024 23:03)  T(F): 97.4 (09 Apr 2024 07:54), Max: 97.9 (09 Apr 2024 04:18)  HR: 56 (09 Apr 2024 07:54) (56 - 82)  BP: 151/93 (09 Apr 2024 07:54) (122/78 - 151/93)  BP(mean): --  RR: 18 (09 Apr 2024 07:54) (18 - 18)  SpO2: 100% (09 Apr 2024 07:54) (96% - 100%)    Parameters below as of 09 Apr 2024 07:54  Patient On (Oxygen Delivery Method): room air    General: appears uncomfortable, vomiting    Detailed Neurologic Exam:    Mental status: The patient is awake and alert and has normal attention span.  The patient is oriented in 3 spheres. The patient is able to name objects, follow commands, repeat sentences.    Cranial nerves: Pupils equal and react symmetrically to light. Unable to assess visual field deficit to confrontation as he tries to forcefully close eyes when opened, unable to keep open long enough to assess visual fields. Extraocular motion is full with b/l nystagmus. Facial sensation is intact. Facial musculature is symmetric.     Motor: There is normal bulk and tone.  There is no tremor.  Strength is 5/5 in the right arm and 4/5 leg.   Strength is 5/5 in the left arm and 4/5 leg.  pain limits full testing of BLE    Sensation: diminished to light touch and pin in b/l lower extremities    Reflexes: 2+ biceps/BR/patellar DTR and plantar responses are flexor.    Cerebellar: There is no dysmetria on finger to nose testing.    Gait : deferred    LABS:                         13.6   6.09  )-----------( 298      ( 08 Apr 2024 06:45 )             40.8     04-09    138  |  101  |  10.1  ----------------------------<  95  3.5   |  23.0  |  0.47<L>    Ca    9.1      09 Apr 2024 09:26  Mg     2.1     04-08    TPro  6.9  /  Alb  3.9  /  TBili  2.8<H>  /  DBili  1.5<H>  /  AST  153<H>  /  ALT  310<H>  /  AlkPhos  94  04-08    LIVER FUNCTIONS - ( 08 Apr 2024 06:45 )  Alb: 3.9 g/dL / Pro: 6.9 g/dL / ALK PHOS: 94 U/L / ALT: 310 U/L / AST: 153 U/L / GGT: x           Vitamin B12, Serum (04.08.24 @ 06:45)   Vitamin B12, Serum: 1173 pg/mL  Folate, Serum (04.08.24 @ 06:45)   Folate, Serum: 7.9 ng/mL    RADIOLOGY & ADDITIONAL STUDIES (independently reviewed unless otherwise noted):    MRI/MRA Head No Cont (04.08.24 @ 19:34)   IMPRESSION:  1.  BRAIN:   Unremarkable MR of the brain.  2.  ANTERIOR CIRCULATION:    Intact.  3. POSTERIOR CIRCULATION:  Intact.    MR Thoracic and Lumbar Spine No Cont (04.08.24 @ 19:37)   IMPRESSION:  1. THORACIC SPINE:   Multiple thoracic disc protrusions (disc herniation)    cause ventral cord deformity and at T3-T4 causes cord edema/gliosis  2. LUMBAR SPINE:   Unremarkable MR of the lumbar spine.      CT Head No Cont (04.08.24 @ 04:04)   IMPRESSION:  Ill-defined hypodensities in the right temporal and right occipital   lobes. It is unclear if these are artifactual in nature.In the presence   of acute also focalized neurological deficits, ischemia may be   entertained. Consider follow-up by MRI imaging, if clinically indicated.       No

## 2024-04-20 NOTE — ED ADULT NURSE NOTE - NSFALLRSKASSESSDT_ED_ALL_ED
"   Trauma Surgery   Progress Note  Admit Date: 4/18/2024  HD#2  POD#2 Days Post-Op    Subjective:   Interval history:  AF, VSS  Refused MRI overnight  Per nursing, patient reports pain but sleeping comfortably on exam this morning  Satting well on 1L NC  Voiding, UOP 1.4L, 0.8cc/kg/hr  No BM     Home Meds:   Current Outpatient Medications   Medication Instructions    multivitamin with minerals tablet 1 tablet, Oral, Daily      Scheduled Meds:  Current Facility-Administered Medications   Medication Dose Route Frequency    acetaminophen  650 mg Oral Q4H    docusate sodium  100 mg Oral BID    enoxparin  40 mg Subcutaneous Q12H    gabapentin  300 mg Oral TID    methocarbamoL  500 mg Oral Q8H    polyethylene glycol  17 g Oral BID    tamsulosin  0.4 mg Oral Daily     Continuous Infusions:  Current Facility-Administered Medications   Medication Dose Route Frequency Last Rate Last Admin    lactated ringers   Intravenous Continuous 100 mL/hr at 04/19/24 1718 New Bag at 04/19/24 1718     PRN Meds:  Current Facility-Administered Medications:     magnesium hydroxide 400 mg/5 ml, 30 mL, Oral, Daily PRN    melatonin, 6 mg, Oral, Nightly PRN    morphine, 4 mg, Intravenous, Q6H PRN    ondansetron, 4 mg, Intravenous, Q6H PRN    oxyCODONE, 5 mg, Oral, Q4H PRN    oxyCODONE, 10 mg, Oral, Q4H PRN     Objective:     VITAL SIGNS: 24 HR MIN & MAX LAST   Temp  Min: 97.6 °F (36.4 °C)  Max: 100.1 °F (37.8 °C)  100.1 °F (37.8 °C)   BP  Min: 93/59  Max: 121/87  121/87    Pulse  Min: 82  Max: 96  82    Resp  Min: 16  Max: 20  20    SpO2  Min: 90 %  Max: 98 %  98 %      HT: 5' 11" (180.3 cm)  WT: 63.5 kg (140 lb)  BMI: 19.5     Intake/output:  Intake/Output - Last 3 Shifts         04/18 0700  04/19 0659 04/19 0700 04/20 0659 04/20 0700 04/21 0659    I.V. (mL/kg) 100 (1.6)      IV Piggyback 1200      Total Intake(mL/kg) 1300 (20.5)      Urine (mL/kg/hr) 150 (0.1) 1400 (0.9)     Blood 100      Total Output 250 1400     Net +1050 -1400         "            Intake/Output Summary (Last 24 hours) at 4/20/2024 0702  Last data filed at 4/20/2024 0628  Gross per 24 hour   Intake --   Output 1400 ml   Net -1400 ml         Lines/drains/airway:       Peripheral IV - Single Lumen 04/18/24 2050 18 G Anterior;Right;Lateral Upper Arm (Active)   Site Assessment Clean;Dry;Intact 04/18/24 2051   Extremity Assessment Distal to IV No abnormal discoloration 04/18/24 2051   Line Status Infusing 04/18/24 2051   Dressing Status Clean;Dry;Intact 04/18/24 2051   Dressing Intervention Integrity maintained 04/18/24 2051   Number of days: 0       Physical examination:  Gen: NAD, sleeping  CV: RR  Resp: NWOB  Abd: S/NT/ND  Msk: LUE in sling, surgical dressing c/d/i  Neuro: CN II-XII grossly intact  Skin/wounds: warm, dry    Labs:  Renal:  Recent Labs     04/18/24  0356 04/19/24  1041 04/20/24  0442   BUN 16.3 13.2 12.9   CREATININE 0.83 0.75 0.70*     Recent Labs     04/18/24  0703   LACTIC 1.3     FEN/GI:  Recent Labs     04/18/24  0356 04/19/24  1041 04/20/24  0442    138 139   K 3.9 4.7 4.1   CO2 23 23 26   CALCIUM 9.2 8.7* 8.5*   MG  --  1.80  --    PHOS  --  2.7  --    ALBUMIN 3.0* 3.0* 2.6*   BILITOT 0.5 0.6 0.4   AST 26 35* 24   ALKPHOS 113 85 74   ALT 14 13 8     Heme:  Recent Labs     04/18/24  0356 04/18/24  0401 04/19/24  0836 04/20/24  0442   HGB 13.4*  --  11.5* 10.4*   HCT 40.2*  --  34.5* 32.0*     --  104* 102*   PTT  --  31.8  --   --    INR  --  1.3  --   --      ID:  Recent Labs     04/18/24  0356 04/19/24  0836 04/20/24  0442   WBC 10.50 8.24 6.13     CBG:  Recent Labs     04/18/24  0356 04/19/24  1041 04/20/24  0442   GLUCOSE 123* 108 91      Recent Labs     04/19/24  0105   POCTGLUCOSE 102        Imaging:  Fl Modified Barium Swallow Speech   Final Result      X-Ray Wrist Complete Left   Final Result      Status post surgical hardware placement in the distal radius through to the 2nd metacarpal for stabilization of the comminuted wrist fracture          Electronically signed by: Shankar Lama   Date:    04/18/2024   Time:    18:02      X-Ray Humerus 2 View Left   Final Result      Satisfactory alignment following internal fixation of the proximal humerus.         Electronically signed by: Lashay Henry   Date:    04/18/2024   Time:    17:54      SURG FL Surgery Fluoro Usage   Final Result      CT 3D RECON WITH INDEPENDENT WS   Final Result      3D reconstruction performed from source data for surgical planning.         Electronically signed by: Hector Zuleta   Date:    04/18/2024   Time:    12:00      X-Ray Hip 2 or 3 views Left (with Pelvis when performed)   Final Result      Degenerative changes.      No definite fractures or dislocations identified.         Electronically signed by: Gokul Turner   Date:    04/18/2024   Time:    11:49      CT 3D RECON WITH INDEPENDENT WS   Final Result      3D reconstructions for surgical planning.         Electronically signed by: Hector Zuleta   Date:    04/18/2024   Time:    11:31      CT Wrist Without Contrast Left   Final Result      Fractures of distal radius and ulna as outlined above         Electronically signed by: Shankar Lama   Date:    04/18/2024   Time:    15:23      CT Shoulder Without Contrast Left   Final Result      Nondisplaced humeral neck fracture         Electronically signed by: Shankar Lama   Date:    04/18/2024   Time:    15:16      X-Ray Wrist Complete Left   Final Result      As above.         Electronically signed by: Loc Hernandez   Date:    04/18/2024   Time:    07:27      X-Ray Shoulder 2 or More Views Left   Final Result      Early degenerative changes in the AC joint         Electronically signed by: Sid Zarate MD   Date:    04/18/2024   Time:    11:54      CT Head Without Contrast   Final Result      No acute intracranial findings.      No significant discrepancy with the preliminary report.         Electronically signed by: Loc Hernandez   Date:    04/18/2024   Time:    09:52       CT Cervical Spine Without Contrast   Final Result      Slight concave compression deformity of the T2 vertebral body superior endplate within the field of view, indeterminate age.  No other fracture seen.  Spondylosis as above.      Agree with whitney         Electronically signed by: Evelio Mace MD   Date:    04/18/2024   Time:    09:00      X-Ray Wrist Complete Left   Final Result      Fractures of the distal radius and ulna.         Electronically signed by: Sid Zarate MD   Date:    04/18/2024   Time:    12:59      X-Ray Chest AP Portable   Final Result      Interval development of right upper lobe opacification.  Developing infectious process is not excluded.  Recommend continued follow-up         Electronically signed by: Hector Zuleta   Date:    04/18/2024   Time:    07:29      MRI Pelvis Without Contrast    (Results Pending)   X-Ray Chest 1 View    (Results Pending)      I have reviewed all pertinent imaging results/findings within the past 24 hours.      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    - [facility]  [facility]  - Repeat CXR this morning shows clear lungs, no indication for treatment  - Wean O2 as tolerated  - Continue minced and moist diet  - Will try to pre-medicate before MRI today  - Bowel regimen  - Daily labs  - NWB to LUE  - PT/OT consulted  - CM consulted  - Lovenox BID    Citlalli  MD Nate  [facility] General Surgery PGY-1     26-Dec-2019 18:36

## 2024-04-28 NOTE — ED ADULT TRIAGE NOTE - ACCOMPANIED BY
Chief complaint:   Chief Complaint   Patient presents with    Physical     Pt is here today for his insurance physical and med check.         Vitals:  Visit Vitals  Vitals:    05/01/24 1001   BP:    Pulse: 96       HISTORY OF PRESENT ILLNESS     HPI  Fede Medrano is a 42 year old male with a PMH of hypertension, hyperlipidemia, elevated ALT, elevated bilirubin, and erectile dysfunction who presented to the office today for an annual physical and to follow up. History obtained by the patient.     We discussed the problems noted below:    The patient was last seen in the office on 01/19/2024.  He was being seen for ongoing chest pain, palpitations, fatigue, and sweating.  Cardiac workup including a Holter monitor, echo, and stress test were normal.  He was having episodes of severe hypertension.  He has been on lisinopril and amlodipine however he attributed several of his symptoms to amlodipine so this was stopped.  At times he had a troponin, D-dimer, and EKGs were normal.  A CMP had showed an elevated total bilirubin.  We did also order urine metanephrines that were normal.  He had urine catecholamines that were slightly high.  A CT of his adrenal glands was normal.  He was referred to endocrinology and they did not suspect pheochromocytoma.  They did recommend an 8:00 a.m. cortisol, acth, DHEA-S, and Fito/renin.  They also recommended a renal artery ultrasound, a sleep study, and consideration of anxiety as a cause.    At his last appointment with myself on 01/19/2024 he was still having significant fatigue and also difficulty sleeping.  He would wake up with his heart racing.  He was considering long COVID as a cause of his symptoms.  He did also notice increased stress but did not feel the need for anxiety medications or counseling.  He did request to mono testing which showed prior infection with EBV but not current.  We also did test for carcinoid tumor and this was normal.    We did send a referral to  the [facility] clinic.  He has been following with them.  They did recommend modafinil.  He took it for only 1 dose and then stopped due to a headache and GI symptoms.    He has been following with endocrinology as well.  An overnight dexamethasone suppression test showed a mildly abnormal test.  They did recommend late-night salivary cortisol x2.  He has not had this done yet.    For his hypertension he is on lisinopril.  He reports that he was tolerating this well.          Since his last appointment he states that he overall is doing better.  The [facility] has recommended increasing water intake and making sure he was getting adequate electrolytes.  He drinks at least 1 Gatorade per day.  He feels that this is helping with some of his symptoms.  He does report that he was going to consider starting the modafinil again soon.  He also started taking vitamin-D and a few other supplements.      He states that he has been having cyclical symptoms of feeling better and then feeling worse.  When he feels worse he has some low energy and sometimes will have the palpitations and chest pain.  However he reports that overall he isn't worrying about his symptoms quite as much.  He does report some baseline feelings of being hypervigilant and being unable to relax.  However he states that he was had anxiety and depression in the past and this does not feel similar.  He has done CBC in the past.  He was against starting a medication for anxiety or depression.  He also does not feel that counseling would be helpful for him right now.    He continues to has been difficulty sleeping and he will be seeing pulmonology/sleep in about 2 months.    His liver testing normalized in January. Today he reports episodes of kia colored stool.  He states that this seems to come in spurts.  Sometimes he will have the kia colored stools are loose stools 2 or 3 times per day and it will last for a couple of weeks, then it will  resolve.  He has had some left flank pain and also left upper quadrant pain but denies any right upper quadrant pain.      Of note the patient has a history of cigarette use.  No longer is smoking but has been vaping for about 3-4 years.  He vapes nicotine.  He has had difficulty quitting but would like to quit at some point.  He states that he was against using Wellbutrin or Chantix.  He also does not want to have to become dependent on nicotine replacement therapies.      Other significant problems:  Patient Active Problem List   Diagnosis    Erectile dysfunction    Essential hypertension    Pure hypercholesterolemia    Elevated ALT measurement    Elevated bilirubin           PAST MEDICAL, FAMILY AND SOCIAL HISTORY     Medications:  Current Outpatient Medications   Medication Sig Dispense Refill    atorvastatin (LIPITOR) 20 MG tablet Take 1 tablet by mouth daily. 90 tablet 3    lisinopril (ZESTRIL) 20 MG tablet Take 1 tablet by mouth daily. 90 tablet 3    LORazepam (ATIVAN) 0.5 MG tablet Take 1 tablet by mouth 30-60 minutes prior to flight. If still having symptoms, you can take a 2nd tablet. 4 tablet 0     No current facility-administered medications for this visit.       Allergies:  ALLERGIES:  No Known Allergies      Past Medical  History/Surgeries:  Past Medical History:   Diagnosis Date    Basal cell carcinoma 09/26/2019    Nose    Borderline personality disorder (CMD) 10/30/2013    Formatting of this note might be different from the original.   Dr Cortez, stress management clinic since May 2013.    Essential hypertension 05/12/2021    OCD (obsessive compulsive disorder) 10/30/2013    PTSD (post-traumatic stress disorder) 10/30/2013     shoulder, right, initial encounter        Past Surgical History:   Procedure Laterality Date    Basal cell carcinoma excision Right     2020    Removal of sperm duct(s)  10/25/2019    Ider tooth extraction           Family History:  Family History   Problem Relation  Age of Onset    Cancer, Ovarian Mother     Patient is unaware of any medical problems Father     Cancer, Skin Maternal Grandmother     Diabetes Maternal Grandmother     Cancer, Skin Maternal Grandfather     Parkinsonism Maternal Grandfather     Patient is unaware of any medical problems Paternal Grandmother     Patient is unaware of any medical problems Paternal Grandfather     Genitourinary Neg Hx         neg gu cancer         Social History:  Social History     Tobacco Use    Smoking status: Former     Current packs/day: 0.00     Average packs/day: 2.0 packs/day for 20.0 years (40.0 ttl pk-yrs)     Types: Cigarettes     Start date: 1998     Quit date: 2018     Years since quittin.4    Smokeless tobacco: Never    Tobacco comments:     Juul pods   Vaping Use    Vaping status: Every Day    Substances: Nicotine   Substance Use Topics    Alcohol use: Not Currently     Alcohol/week: 20.0 standard drinks of alcohol     Types: 20 Shots of liquor per week     Comment: between 10-20 mixed drinks    Drug use: Not Currently         REVIEW OF SYSTEMS     ROS Negative other than as noted in HPI    PHYSICAL EXAM     Physical Exam  Vitals and nursing note reviewed.   Constitutional:       General: He is not in acute distress.     Appearance: Normal appearance.   HENT:      Head: Normocephalic and atraumatic.      Right Ear: External ear normal.      Left Ear: External ear normal.      Nose: Nose normal.      Mouth/Throat:      Mouth: Mucous membranes are moist.      Pharynx: Oropharynx is clear. No oropharyngeal exudate.      Neck: Normal range of motion.   Eyes:      Extraocular Movements: Extraocular movements intact.      Conjunctiva/sclera: Conjunctivae normal.      Pupils: Pupils are equal, round, and reactive to light.   Cardiovascular:      Rate and Rhythm: Normal rate and regular rhythm.      Heart sounds: Normal heart sounds.   Pulmonary:      Effort: Pulmonary effort is normal. No respiratory distress.       Breath sounds: Normal breath sounds.   Abdominal:      General: There is no distension.      Palpations: Abdomen is soft.      Tenderness: There is no abdominal tenderness.   Musculoskeletal:         General: Normal range of motion.      Right lower leg: No edema.      Left lower leg: No edema.   Skin:     General: Skin is warm.      Capillary Refill: Capillary refill takes less than 2 seconds.   Neurological:      General: No focal deficit present.      Mental Status: He is alert and oriented to person, place, and time.   Psychiatric:         Mood and Affect: Mood normal.           ASSESSMENT/PLAN     Assessment and Plan:  Well adult exam    The patient presented to the office for his yearly physical but also has a follow up appointment.  He was pretty much up-to-date on his health maintenance requirements.  We did discuss vaping cessation.  He declines any help with this right now but he will work on quitting.  He does not yet qualify for lung cancer screening.  He was due for his Tdap today.  He would like to get this done.  He declines hep B and COVID.  Discussed that he should continue his diet changes including increasing water in his diet.    Need for vaccination  - Tdap 10+ yrs (Boostrix) - [id number]    Health Maintenance:   Colonoscopy:  Not yet indicated  PSA:  Not yet indicated  Vaccines: Due for Tdap, hep B, COVID.   Lung Cancer Screening:  Not yet indicated  Medicare Wellness:  Not yet indicated      In addition to preventative visit, the following acute and/or chronic problems were addressed today:      Donnell-colored stools  - Comprehensive Metabolic Panel; Future  - SERVICE TO GASTROENTEROLOGY    He states that he has been having episodes of loose stools and donnell-colored stools that occur several times per day and lasts for couple of weeks, then resolve.  The symptoms then come back again a few weeks later.  He previously did have elevated liver function testing but on his most recent check this had  normalized.  We will obtain a CMP again today.  He previously had a right upper quadrant ultrasound that was normal.  We did discuss the possibility of further testing versus referral to Gastroenterology.  He would prefer to see Gastroenterology prior to more testing being ordered.      Pure hypercholesterolemia  - atorvastatin (LIPITOR) 20 MG tablet; Take 1 tablet by mouth daily.  Dispense: 90 tablet; Refill: 3    His last cholesterol level was good however he reported he was not taking the cholesterol medication at that time.  He now has been tolerating atorvastatin well.  We will plan to recheck his cholesterol levels in another 5-6 months.      Palpitations  Essential hypertension  Left flank pain  - US Vasc Renal Duplex Only Bilateral; Future  - US KIDNEY BILATERAL; Future    Overall he was having less symptoms of his palpitations and his blood pressure has remained relatively well-controlled.  However he would like to complete workup for his episodes of hypertension and palpitations with getting a renal artery ultrasound.  This was ordered.  Of note he also is having pain in his left flank so we will obtain a renal ultrasound for that as well.  He does not have pain today but he gets episodes of the left flank pain.  He also reports he thinks he had a kidney stone in the past.  We did also discuss the possibility of anxiety and depression playing a role in his palpitations and his fatigue.  He does not feel that this is the case and declines any medications or therapy referrals.  He will let us know if he changes his mind.  He should keep the appointment with pulmonology for a sleep study as planned.      Documentation for this visit on 05/01/2024 was completed using a template.  I have seen and examined the patient.  Everything documented was personally performed at this visit with the necessary additions, deletions and changes made as appropriate.    Follow-up Information  Return in about 5 months (around  10/7/2024) for follow-up .  LOS Today    Patient/Caregiver verbalizes understanding of plan of care/instructions discussed today.      Josephine Carlin DO  Internal Medicine-Pediatrics  05/01/24, 10:07 AM     Immediate family member

## 2024-05-03 NOTE — OCCUPATIONAL THERAPY INITIAL EVALUATION ADULT - PHYSICAL ASSIST/NONPHYSICAL ASSIST: SCOOT/BRIDGE, REHAB EVAL
PRE-SEDATION ASSESSMENT    CONSENT  Risks, benefits, and alternatives have been discussed with the patient/patient representative, and patient/patient representative agrees to proceed: Yes    MEDICAL HISTORY  Significant medical/surgical history: Yes  Past Complications with Sedation/Anesthesia: No  Significant Family History: No  Smoking History: No  Alcohol/Drug abuse: No  Possible Pregnancy (LMP): No  Cardiac History: No  Respiratory History: No    PHYSICAL EXAM  History and Physical Reviewed: Patient has valid H&P within 30 days. I have reviewed and there are no changes.  H&P Updates: Denies difficulty swallowing  Airway Risk History: No previous history  Airway Anatomy : Class II  Heart : Normal  Lungs : Normal  LOC/Mental Status : Normal    OTHER FINDINGS  Reviewed current medications and allergies: Yes  Pertinent lab/diagnostic test reviewed: Yes    SEDATION RISK ASSESSMENT  Risk Status ASA: Class II - Normal patient with mild systemic disease  Plan for Sedation: Moderate Sedation  Indications for Procedure/Pre-Procedure Diagnosis and Planned Procedure: Stroke  EKG Monitoring: Yes    NARRATIVE FINDINGS  Narrative Findings: BERNABE under moderate sedation   1 person assist/verbal cues

## 2024-05-08 NOTE — ED ADULT NURSE NOTE - AREA
[FreeTextEntry2] : Unless mentioned in HPI- Denies fever, chills and fatigue. Eyes:  Denies eye pain, vision problems and itching. Cardiovascular:  Denies chest pain, palpitations and lower extremity edema. Respiratory:  Denies shortness of breath, wheezing, cough and dyspnea on exertion. Gastrointestinal:  Denies abdominal pain, nausea, constipation, diarrhea and vomiting. Neurological:  Denies headache, dizziness, ataxia and memory loss. Psychiatric:  Denies suicidal ideations, insomnia, anxiety and depression.   lateral

## 2024-06-12 NOTE — PATIENT PROFILE ADULT - NSFALLSECTIONLABEL_GEN_A_CORE
Patient states that her Advair gave her bronchitis and wants changed to Gume Maldonado    Please advise   .

## 2024-06-19 NOTE — PROGRESS NOTE ADULT - REASON FOR ADMISSION
Try LV dysfunction, cardiomyopathy   Syncopal episode at home at 0430 this am, LOC for approx 3 minutes per wife, pt had episode of shaking during that time. Hx of multiple falls. Pt reporting abdominal pain for X 1day, no nausea or vomiting, endorses loose stool on lactulose at home

## 2024-08-10 NOTE — ED PROVIDER NOTE - RADIATION
Pt ambulatory to room with the use of his Rolator with son. Son states pt jones catheter is leaking at insertion site, states it was placed two weeks ago by his urologist. Pt has appointment at urologist on 8/21.   see hpi

## 2024-09-27 NOTE — PROGRESS NOTE ADULT - PROBLEM SELECTOR PLAN 6
"Care Management Follow Up    Length of Stay (days): 5    Expected Discharge Date: 10/01/2024    Anticipated Discharge Plan:   TBD , pt /wife prefers pt return home with home care/family assist , rather than TCU.       Transportation: TBD    PT Recommendations: Transitional Care Facility  OT Recommendations:  (S) Transitional Care Facility       Barriers to Discharge: medical stability/ on HFNC. Per rounds plans to get pt up today and attempt to collect sputum sample if able.       Prior Living Situation:   Pt lives with spouse Archana in a townhouse. Pt gets some assist with ADLS such as bathing from spouse, and has a shower chair, walker, and manual w/c(only used for outings). Pt gets assist with mostly all IADLS, pt does manage finances. Pt is STBA/A-1. Pt's spouse Archana assists with all. She states, \"he is of sound mind to do these things but he has such bad tremors so I assist him. \" Pt has no home care svcs prior. Pt is retired. Has an established PCP. Pt/ wife anticipate pt returning home with her and family support,and possible home care if recommended. Family to transport.        Discussed  Partnership in Safe Discharge Planning  document with patient/family: No     Handoff Completed: Not at this time     Patient/Spokesperson Updated: No    Additional Information:    RNCM spoke to pt's spouse yesterday. She prefers pt returns home with family support, and home care instead of TCU.         Pt is on HFNC at this time, she would be ok if pt is able to wean down that pt goes home with oxygen if needed.       [facility] accepted pt for home PT/OT/RN/HHA services.         Next Steps: Communicate with accepting home care agency on discharge plan, and send orders day of discharge. Cm will continue to follow plan of care,review recommendations, and assist with any discharge needs anticipated.       Lexis Capps RN      " HFpEF (EF 70%). Appears euvolemic   - hold diuretics in setting of MISA

## 2024-10-03 NOTE — PATIENT PROFILE ADULT - PROVIDER NAME
FMLA or Disability Forms were received and faxed to the Forms Completion Department today at [phone]. (Please include all appropriate authorization forms with your fax).    Did you have the patient complete (in full) and sign the “Authorization For Disclosure of Health Information Forms Completion” form? Yes    DUE TO VERY HIGH FORM VOLUMES, please communicate to the patient that the Forms Completion team estimates their form may take longer than our usual 14 business day turnaround goal.    If you have questions about this encounter, please contact the Forms Completion Dept at [phone], Option 3.    Please call at [phone] to  complete FMLA forms at the [facility] after they have been faxed to [phone].   Form completed and sent to Physician's Office electronically via In [employer] messaging for review and signature.      Completed form will be sent to [facility]   Form completed and sent to Physician's Office electronically via In [employer] messaging for review and signature.     Completed form will be sent to Chevy  .     Form received at [facility] on 10/2/2024.   Please note that it takes 7-10 business days for completion.  Auth received with form.     Form received at [facility] on 10/2/2024.   Please note that it takes 7-10 business days for completion.  Auth received with form.     Form signed and faxed back to form completion.    Forms printed and placed on Dr Prado desk to be signed .    Received signed and completed form from Physician's Office.    Completed form was faxed to [phone] [employer]..    Called patient to notify him the form was available for        17.64 DR Dillan Coto

## 2024-10-04 NOTE — PRE-ANESTHESIA EVALUATION ADULT - NSANTHTOBACCOSD_GEN_ALL_CORE
What Is The Reason For Today's Visit?: Full Body Skin Examination What Is The Reason For Today's Visit? (Being Monitored For X): concerning skin lesions on a periodic basis Denies/No

## 2024-10-15 NOTE — PROGRESS NOTE ADULT - ASSESSMENT
s/p liver transplant  osteomyelitis     cont regular diet  IV antibiotics per ID  rejection regimen per transplant service  oob as tolerated  podiatry follow-up .

## 2024-11-12 NOTE — ED PROVIDER NOTE - CHPI ED SYMPTOMS NEG
hide
no nausea/no abdominal distension/no dysuria/no burning urination/no blood in stool/no fever/no vomiting/no diarrhea/no hematuria/no chills

## 2024-11-16 NOTE — CONSULT NOTE ADULT - CONSULT REASON
L thigh hematoma
anemia
anemia  r/o gi bleed
assessment of liver transplant waitlisted patient
diastolic hf, cardiac clearance
leukocytosis
No

## 2024-11-20 NOTE — PROGRESS NOTE ADULT - PROVIDER SPECIALTY LIST ADULT
I called and spoke with patient regarding upcoming appointment with Manny Chandra NP, confirmed time/date. Let patient know lab work has been ordered to review at visit - pt will have labs drawn tomorrow 11/21. Pt appreciative of call and had no further questions.    Gastroenterology

## 2024-11-25 NOTE — PROGRESS NOTE ADULT - PROBLEM SELECTOR PROBLEM 8
Patient Seen in: [facility]      History     Chief Complaint   Patient presents with    Sore Throat    Cough/URI     Stated Complaint: Fever,Sore Thraot ,Congested  Subjective:   HPI    This is a well-appearing 9-year-old who presents with sore throat, congestion which started yesterday.  Tactile fever at home.  Ibuprofen given at 0500.  Reports increasing sneezing as well.  Denies any shortness of breath or chest pain.  No abdominal pain.  No rash.  No nausea, vomiting or diarrhea.  No sick contacts.  Fully immunized.      Objective:   Past Medical History:    Adenoid hypertrophy    Conjunctivitis    Dermatitis    Screening for early childhood developmental handicap    Severe obstructive sleep apnea    Pediatric EDWIGE - desats to 74%    Transient  thrombocytopenia (HCC)    URI (upper respiratory infection)            Past Surgical History:   Procedure Laterality Date    Adenoidectomy                Social History     Socioeconomic History    Marital status: Single   Tobacco Use    Smoking status: Never     Passive exposure: Never    Smokeless tobacco: Never   Vaping Use    Vaping status: Never Used   Substance and Sexual Activity    Alcohol use: No    Drug use: No   Other Topics Concern    Second-hand smoke exposure No    Alcohol/drug concerns No    Violence concerns No            Review of Systems   All other systems reviewed and are negative.      Positive for stated complaint: Sore Throat and Cough/URI    Other systems are as noted in HPI.  Constitutional and vital signs reviewed.      All other systems reviewed and negative except as noted above.    Physical Exam     ED Triage Vitals [24 0837]   BP (!) 122/72   Pulse 94   Resp 22   Temp 99 °F (37.2 °C)   Temp src Oral   SpO2 99 %   O2 Device None (Room air)     Current:BP (!) 122/72   Pulse 94   Temp 99 °F (37.2 °C) (Oral)   Resp 22   Wt 40.5 kg   SpO2 99%     Physical Exam  Vitals and nursing note reviewed.   Constitutional:        General: He is active. He is not in acute distress.     Appearance: He is not ill-appearing or toxic-appearing.   HENT:      Head: Normocephalic and atraumatic.      Right Ear: No tenderness. No middle ear effusion. Tympanic membrane is not erythematous.      Left Ear: No tenderness.  No middle ear effusion. Tympanic membrane is not erythematous.      Nose: Rhinorrhea present. Rhinorrhea is purulent.      Mouth/Throat:      Lips: Pink.      Mouth: No oral lesions.      Pharynx: Oropharynx is clear. Uvula midline. No pharyngeal swelling, oropharyngeal exudate, posterior oropharyngeal erythema, pharyngeal petechiae, cleft palate, uvula swelling or postnasal drip.   Cardiovascular:      Rate and Rhythm: Normal rate.      Pulses: Normal pulses.      Heart sounds: Normal heart sounds.   Pulmonary:      Effort: Pulmonary effort is normal.      Breath sounds: Normal breath sounds and air entry. No stridor, decreased air movement or transmitted upper airway sounds.   Abdominal:      General: Bowel sounds are normal.      Palpations: Abdomen is soft.   Musculoskeletal:      Cervical back: Full passive range of motion without pain and normal range of motion.   Skin:     General: Skin is warm and dry.   Neurological:      General: No focal deficit present.      Mental Status: He is alert.   Psychiatric:         Mood and Affect: Mood normal.         Behavior: Behavior normal.         Thought Content: Thought content normal.         Judgment: Judgment normal.       ED Course   Strep negative  No results found.  Labs Reviewed   RAPID STREP A - Normal       MDM     Medical Decision Making  Differential diagnoses reflecting the complexity of care include but are not limited to viral versus bacterial pharyngitis, upper respiratory infection, pneumonia.    Comorbidities that add complexity to management include: N/A  History obtained by an independent source was from: Patient mother  Discussions of management was done with: N/A  My  independent interpretations of studies include: Strep negative  Shared decision making was done by: Patient mother and myself  Patient is well appearing, non-toxic and in no acute distress.  Vital signs are stable.  Patient with clear speech, no drooling, easy respirations.  Discussed with patient and mother strep here was negative.  He is with clear speech, easy respirations, no drooling noted.  Lungs clear bilaterally, do not believe any imaging is warranted at this time and mother agrees.  Encouraged her to use cool-mist humidifier at night, may give him Mucinex, salt water gargles, tea with honey.  Close follow-up with the pediatrician is recommended.  Mother verbalized plan of care and stated understanding.    Amount and/or Complexity of Data Reviewed  ECG/medicine tests: ordered and independent interpretation performed. Decision-making details documented in ED Course.    Risk  OTC drugs.        Disposition and Plan     Clinical Impression:  1. Upper respiratory tract infection, unspecified type         Disposition:  Discharge  11/25/2024  9:05 am    Follow-up:  Tosha Pearson DO  [address] [address]  [phone]                Medications Prescribed:  Discharge Medication List as of 11/25/2024  9:05 AM             Note to patient: The 21[employer] cares act makes medical notes like these available to patients in the interest of transparency.  However, be advised this medical document and is intended as peer to peer communication.  It is read the medical language and may contain abbreviations or verbiage that are unfamiliar.  It may appear blunt or direct.  Medical documents are intended to carry relevant information, fax is evident and the clinical opinion of the practitioner.    This note was prepared using Dragon Medical voice recognition dictation software.  As a result, errors may occur.  When identified, these errors have been corrected.  While every attempt is made to correct  errors during dictation, discrepancies may still exist.    Isabella Vicente, APRN  11/25/2024  9:05 AM                        Chronic kidney disease, unspecified CKD stage History of BPH

## 2024-12-03 NOTE — PROGRESS NOTE ADULT - PROVIDER SPECIALTY LIST ADULT
Anesthesia Post-op Note    Patient: Yoni Perez  Procedure(s) Performed: ESOPHAGOGASTRODUODENOSCOPY (EGD)  Anesthesia type: MAC    Vitals Value Taken Time   Temp 36.8 °C (98.2 °F) 12/03/24 1144   Pulse 73 12/03/24 1144   Resp 21 12/03/24 1144   SpO2 94 % 12/03/24 1144   /89 12/03/24 1144         Patient Location: Phase II  Post-op Vital Signs:stable  Level of Consciousness: awake and alert  Respiratory Status: spontaneous ventilation  Cardiovascular stable  Hydration: euvolemic  Pain Management: adequately controlled  Handoff: Handoff to receiving clinician was performed and questions were answered  Vomiting: none  Nausea: None  Airway Patency:patent  Post-op Assessment: no complications and patient tolerated procedure well      No notable events documented.                       Transplant Surgery

## 2024-12-17 NOTE — DISCHARGE NOTE PROVIDER - NSDCHHPTASSISTHOME_GEN_ALL_CORE
Patient does not need labs. Cancelled lab appointment    Patient Needs Assistance to Leave Residence...

## 2025-01-01 NOTE — PROGRESS NOTE ADULT - CARDIOVASCULAR
Regular rate & rhythm, normal S1, S2; no murmurs, gallops or rubs; no S3, S4
Regular rate & rhythm, normal S1, S2; no murmurs, gallops or rubs; no S3, S4
No

## 2025-01-07 NOTE — PHYSICAL THERAPY INITIAL EVALUATION ADULT - PATIENT PROFILE REVIEW, REHAB EVAL
January 7, 2025         Patient: Noel Chery   YOB: 2010     To Whom It May Concern,    I am writing to confirm that Noel Chery has been under my care for a concussion. Following a comprehensive evaluation and monitoring, I am pleased to report that they have recovered from his concussion. He continues to have neck pain related to his injury for which he is in Physical therapy.     He is cleared for sports without restrictions as tolerated ( due to neck pain )     I advise ongoing monitoring for any signs of symptom recurrence or new concerns.    Please feel free to contact me directly if you have any questions or require additional information regarding Noel Chery's concussion recovery and clearance.    Sincerely,           Christian Agarwal MD   Pediatric Neurology Attending   [facility]  P [phone]  F [phone]                                              
yes
yes

## 2025-01-07 NOTE — H&P ADULT - PROBLEM SELECTOR PROBLEM 4
Discharge Facility: [facility]  Discharge Diagnosis: Acute pancreatitis, unspecified complication status, unspecified pancreatitis type   Admission Date: 1/5/2025  Discharge Date: 1/6/2025    PCP Appointment Date: none-task  Specialist Appointment Date: none    Hospital Encounter and Summary Linked: Yes  See discharge assessment below for further details    Issues Requiring Follow-Up  Follow-up with PCP in the next week regarding hospitalization stay and BP control.     START taking these medications     ciprofloxacin 500 mg tablet; Commonly known as: Cipro; Take 1 tablet (500 mg) by mouth every 12 hours for 11 doses.   metroNIDAZOLE 500 mg tablet; Commonly known as: Flagyl; Take 1 tablet (500 mg) by mouth every 8 hours for 17 doses    Engagement  Call Start Time: 1111 (1/7/2025 11:11 AM)    Medications  Medications reviewed with patient/caregiver?: Yes (1/7/2025 11:11 AM)  Is the patient having any side effects they believe may be caused by any medication additions or changes?: No (1/7/2025 11:11 AM)  Does the patient have all medications ordered at discharge?: Yes (1/7/2025 11:11 AM)  Care Management Interventions: No intervention needed (1/7/2025 11:11 AM)  Prescription Comments: cipro and flagyl (1/7/2025 11:11 AM)  Is the patient taking all medications as directed (includes completed medication regime)?: Yes (1/7/2025 11:11 AM)  Medication Comments: see med list (1/7/2025 11:11 AM)    Appointments  Does the patient have a primary care provider?: Yes (1/7/2025 11:11 AM)  Care Management Interventions: Advised patient to make appointment (1/7/2025 11:11 AM)  Has the patient kept scheduled appointments due by today?: Yes (1/7/2025 11:11 AM)  Care Management Interventions: Advised patient to keep appointment (1/7/2025 11:11 AM)    Self Management  What is the home health agency?: none (1/7/2025 11:11 AM)  Has home health visited the patient within 72 hours of discharge?: Not applicable (1/7/2025 11:11 AM)  What Durable  Medical Equipment (DME) was ordered?: n/a (1/7/2025 11:11 AM)    Patient Teaching  Does the patient have access to their discharge instructions?: Yes (1/7/2025 11:11 AM)  Care Management Interventions: Reviewed instructions with patient (1/7/2025 11:11 AM)  What is the patient's perception of their health status since discharge?: Improving (1/7/2025 11:11 AM)  Is the patient/caregiver able to teach back the hierarchy of who to call/visit for symptoms/problems? PCP, Specialist, Home Health nurse, Urgent Care, ED, 911: Yes (1/7/2025 11:11 AM)  Patient/Caregiver Education Comments: see wrap up (1/7/2025 11:11 AM)    Wrap Up  Wrap Up Additional Comments: CTS spoke with patient. He was admitted to [facility] on 1/5/2025 with acute pancreatitis. Discharged with no home care on 1/6/2025. Patient stated that he was feeling much better. Denies any pain, nausea or vomiting. Finally had a BM at home. Feels he has more energy. Reviewed medications. No issues with obtaining his new medications. Understands discharge instructions. Patient does need an appt with PCP, no availability. No questions or concnerns at this time. (1/7/2025 11:11 AM)  Call End Time: 1114 (1/7/2025 11:11 AM)           PUD (peptic ulcer disease) Hypertension

## 2025-01-21 NOTE — PATIENT PROFILE ADULT - NSTRANSFERHEARINGAID_GEN_A_NUR
Informed patient of results and recommendations. Verbalized understanding. Patient is going to be getting Skyrizi through his dermatologist and is aware he will need to stop the Otezla when he starts Skyrizi. Otezla discontinued from patient's med list. Patient will call when he finds out the dosage and sig of the Skyrizi.   left/"Had one but the dog ate it" right/"Had one but the dog ate it"

## 2025-02-19 NOTE — PROGRESS NOTE ADULT - SUBJECTIVE AND OBJECTIVE BOX
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     Today's Updates:  - Patient has had several bowel movements and feels less pressure in his abdomen  - Goals of Care conversation with nephew and Palliative Care today   - Patient is uncertain about hospice care as an option   - As he is feeling better, he is now wanting to go to [facility]   - Plan for discharge tomorrow, if patient remains stable/is improving    Recurrent malignant Small Bowel Obstruction  Presenting with abdominal pain which is similar to his previous obstructive related pain. CT imaging does show progression compared to past imaging. GS consulted, unfortunately is not a good surgical or venting G tube candidate. CT A/P with interval increase in SBO with dilated loops of jejunum (2/12). Multiple discussions between oncology, general surgery, surgical oncology, advanced endoscopy GI. No safe procedural options (venting G tube etc) due anatomy and ascites. Placed NGT for decompression (2/12) and removed (2/14) after successful completion of clamp trial with 125 mL suctioned after four hours.     Patient hopes to be discharged to go home to [facility] and pass away there. Extensive conversations regarding potential discharge options, including home hospice / hospice facility and had been working toward discharge coordination with that goal in mind.     Unfortunately obstructive symptoms recurred 2/17--vomiting, distension, not passing gas. Repeat AXR without overt  obstruction but it does not seem wise for him to try to get on a plane for a 2 day flight with worsening symptoms. Patients symptoms have improved on 2/19 with multiple small bowel movements and improved abdominal pain, he is more motivated to travel to [facility] and wishes to be discharged with this plan.  - Patient is still NPO  - Continue TPN via port  - Low threshold to repeat imaging and place NG tube for decompression  - Pain control with scheduled tylenol, IV dilaudid, sublingual oxycodone  - Nausea control with sublingual zofran, zyprexa per palliative     Note: medications for discharge had been ordered 2/17 in anticipation of discharge. A one month supply was filled and on hold in the discharge pharmacy and can be held for 1 week. If attempting to discharge before 2/24, please make changes only to necessary medication and do not resend all of the medications. Adding Butrans patch, per Palliative.    Acute pulmonary embolism   B/l lower extremity DVTs  CT with nonocclusive segmental embolism in right lower lobe without evidence of heart strain, trop within normal limits and EKG reassuring--in the setting of malignancy. CTH without mets or bleed. Normotensive, on room air. Given patient preference to avoid pork derived products treated with bivalirudin drip.   - Continue xarelto (started on 2/16)    Metastatic appendiceal adenocarcinoma w/ peritoneal carcinomatosis   No current chemotherapy iso of bowel obstructions and malignant PE. Regorafenib 80mg not ordered, plan was for 3 weeks on/1 week off per onc note 2/3.  - Per oncology, patient needs to have better resolution of SBO to be able to realistically continue regorafenib. Would not be planning on any additional chemo if going to [facility]  - Palliative care following    Malignant pleural effusion requiring therapeutic thoracentesis   H/o hydropneumothorax   Pulmonary metastasis   Patient complaining of chest discomfort on 2/12 overnight, with no interval  "changes in malignant pleural effusion per CXR. No concern for new cardiac etiology. Thoracentesis completed on 2/14 with 750 mL removed.  - Continue to monitor    Left lower lung nodule  Findings on CT concerning for infection, currently afebrile without white count, on room air.  - Monitor for infection    H/o coronary artery disease   Noted on stress echo, s/p LAD stent 12/2023.   - Aspirin on hold while on anticoagulation    Polycythemia vera with exon 12 mutation  -undergoing intermittent phlebotomy with a goal to keep hematocrit below 50     Cardiac Monitoring: None  Code Status: Patient remains full code per discussion with palliative. Though patient realizes that he is dying. Note to staff: Full Code status DOES NOT compel us to code him if doing so is deemed medically futile.         Diet: Diet  NPO for Medical/Clinical Reasons Except for: Meds, Ice Chips  parenteral nutrition - ADULT compounded formula  parenteral nutrition - ADULT compounded formula    DVT Prophylaxis: DOAC  Anderson Catheter: Not present  Lines: PRESENT      Port A Cath Single 01/25/17 Right Chest wall-Site Assessment: WDL      Cardiac Monitoring: None  Code Status: Full Code      Clinically Significant Risk Factors         # Hyponatremia: Lowest Na = 133 mmol/L in last 2 days, will monitor as appropriate  # Hypochloremia: Lowest Cl = 97 mmol/L in last 2 days, will monitor as appropriate  # Hypocalcemia: Lowest Ca = 8.5 mg/dL in last 2 days, will monitor and replace as appropriate     # Hypoalbuminemia: Lowest albumin = 3.1 g/dL at 2/13/2025  4:25 AM, will monitor as appropriate    # Coagulation Defect: INR = 1.63 (Ref range: 0.85 - 1.15) and/or PTT = 37 Seconds (Ref range: 22 - 38 Seconds), will monitor for bleeding               # Cachexia: Estimated body mass index is 17.7 kg/m  as calculated from the following:    Height as of this encounter: 1.803 m (5' 11\").    Weight as of this encounter: 57.6 kg (126 lb 14.4 oz).   # Severe " [facility]  Ramón Morales PA-C  [address] [place] [address]  [phone]      INTERVAL HPI/OVERNIGHT EVENTS:    patient s/e  events noted  seen in icu  no diarrhea  had HD line in right IJ placed  states he has metallic taste in his mouth  also c/o epistaxis     MEDICATIONS  (STANDING):  aspirin  chewable 81 milliGRAM(s) Oral daily  chlorhexidine 2% Cloths 1 Application(s) Topical daily  epoetin barry-epbx (RETACRIT) Injectable 84331 Unit(s) SubCutaneous every 7 days  everolimus (ZORTRESS) 1.5 milliGRAM(s) Oral <User Schedule>  heparin   Injectable 5000 Unit(s) SubCutaneous every 12 hours  insulin glargine Injectable (LANTUS) 10 Unit(s) SubCutaneous at bedtime  insulin lispro (ADMELOG) corrective regimen sliding scale   SubCutaneous three times a day before meals  insulin lispro (ADMELOG) corrective regimen sliding scale   SubCutaneous at bedtime  insulin lispro Injectable (ADMELOG) 5 Unit(s) SubCutaneous three times a day before meals  insulin lispro Injectable (ADMELOG) 2 Unit(s) SubCutaneous once  magnesium oxide 400 milliGRAM(s) Oral daily  meropenem  IVPB 500 milliGRAM(s) IV Intermittent every 24 hours  nystatin    Suspension 655476 Unit(s) Oral four times a day  ofloxacin 0.3% Solution 10 Drop(s) Right Ear two times a day  pantoprazole    Tablet 40 milliGRAM(s) Oral before breakfast  predniSONE   Tablet 5 milliGRAM(s) Oral two times a day  sodium bicarbonate 1300 milliGRAM(s) Oral three times a day  sodium bicarbonate  Infusion 0.05 mEq/kG/Hr (75 mL/Hr) IV Continuous <Continuous>  tamsulosin 0.4 milliGRAM(s) Oral at bedtime  valGANciclovir 450 milliGRAM(s) Oral <User Schedule>  vancomycin    Solution 125 milliGRAM(s) Oral every 6 hours    MEDICATIONS  (PRN):      Allergies    codeine (Anaphylaxis)    Intolerances        ROS:   General:  No wt loss, fevers, chills, night sweats, + fatigue,   Eyes:  Good vision, no reported pain  ENT:  No sore throat, pain, runny nose, dysphagia  CV:  No pain, palpitations, hypo/hypertension  Resp:  No dyspnea, cough, tachypnea, wheezing  GI:  No pain, No nausea, No vomiting, No diarrhea, No constipation, No weight loss, No fever, No pruritis, No rectal bleeding, No tarry stools, No dysphagia,  :  No pain, bleeding, incontinence, nocturia  Muscle:  No pain, weakness  Neuro:  No weakness, tingling, memory problems  Psych:  No fatigue, insomnia, mood problems, depression  Endocrine:  No polyuria, polydipsia, cold/heat intolerance  Heme:  No petechiae, ecchymosis, easy bruisability  Skin:  No rash, tattoos, scars, edema      PHYSICAL EXAM:   Vital Signs:  Vital Signs Last 24 Hrs  T(C): 36.5 (19 Dec 2020 15:00), Max: 37.2 (19 Dec 2020 04:00)  T(F): 97.7 (19 Dec 2020 15:00), Max: 99 (19 Dec 2020 04:00)  HR: 75 (19 Dec 2020 15:00) (75 - 91)  BP: 148/68 (19 Dec 2020 15:00) (118/81 - 163/70)  BP(mean): 95 (19 Dec 2020 15:00) (90 - 106)  RR: 20 (19 Dec 2020 15:00) (16 - 20)  SpO2: 96% (19 Dec 2020 15:00) (92% - 98%)  Daily     Daily Weight in k.8 (18 Dec 2020 20:35)    GENERAL:  Appears stated age, well-groomed, well-nourished, no distress  HEENT:  NC/AT,  conjunctivae clear and pink, no thyromegaly, nodules, adenopathy, no JVD, sclera -anicteric  CHEST:  Full & symmetric excursion, no increased effort, breath sounds clear  HEART:  Regular rhythm, S1, S2, no murmur/rub/S3/S4, no abdominal bruit, no edema  ABDOMEN:  Soft, non-tender, non-distended, normoactive bowel sounds,  no masses ,no hepato-splenomegaly, no signs of chronic liver disease  EXTEREMITIES:  no cyanosis,clubbing or edema  SKIN:  No rash/erythema/ecchymoses/petechiae/wounds/abscess/warm/dry  NEURO:  Alert, oriented, no asterixis, no tremor, no encephalopathy      LABS:                        7.4    4.50  )-----------( 179      ( 19 Dec 2020 05:29 )             22.9     12-    133<L>  |  101  |  92<H>  ----------------------------<  224<H>  4.7   |  16<L>  |  6.94<H>    Ca    8.2<L>      19 Dec 2020 05:29  Phos  4.6     12  Mg     2.2         TPro  5.8<L>  /  Alb  2.6<L>  /  TBili  0.1<L>  /  DBili  x   /  AST  31  /  ALT  47<H>  /  AlkPhos  222<H>  12-    PT/INR - ( 19 Dec 2020 05:29 )   PT: 13.1 sec;   INR: 1.10 ratio         PTT - ( 19 Dec 2020 05:29 )  PTT:26.1 sec  Urinalysis Basic - ( 17 Dec 2020 16:54 )    Color: Light Yellow / Appearance: Slightly Turbid / S.012 / pH: x  Gluc: x / Ketone: Negative  / Bili: Negative / Urobili: Negative   Blood: x / Protein: 100 mg/dL / Nitrite: Negative   Leuk Esterase: Large / RBC: 3 /hpf /  /HPF   Sq Epi: x / Non Sq Epi: 0 /hpf / Bacteria: Negative        RADIOLOGY & ADDITIONAL TESTS:   Malnutrition: based on nutrition assessment      # Financial/Environmental Concerns: none         Social Drivers of Health    Tobacco Use: Medium Risk (2/9/2025)    Patient History     Smoking Tobacco Use: Former     Smokeless Tobacco Use: Never     Passive Exposure: Never   Physical Activity: Not on File (8/4/2023)    Received from [facility]    Physical Activity     Physical Activity: 0   Stress: Not on File (8/4/2023)    Received from [facility]    Stress     Stress: 0   Social Connections: Not on File (8/4/2023)    Received from [facility]    Social Connections     Connectedness: 0          Disposition Plan     Medically Ready for Discharge: Anticipated Tomorrow    Dorothy Davidson MD  Medicine Service, [facility] TEAM 2  [facility]  Securely message with [employer] (more info)  Text page via [employer] Paging/Directory   See signed in provider for up to date coverage information  ______________________________________________________________________    Interval History   No acute events reported overnight. Patient is feeling much better today, with reduced abdominal pain and multiple small bowel movements. After goals of care conversation, patient wishes to go back to [facility] and asks for all his medications to be prepared for this intention. He is hopeful he will be able to leave tomorrow, directly for the airport.    Physical Exam   Vital Signs: Temp: 98.5  F (36.9  C) Temp src: Oral BP: 116/83 Pulse: 97   Resp: 18 SpO2: 97 % O2 Device: None (Room air)    Weight: 126 lbs 14.4 oz    Gen: Awake, alert, lying in bed, cousin and nephew at bedside.   ENT: MMM  CV: RRR, no MRG, no LE edema  Resp: mildly tachypneic  GI: mildly distended but soft, Tender in RLS    Medical Decision Making       Data   Lab Results   Component Value Date    WBC 9.3 02/19/2025    HGB 12.8 (L) 02/19/2025    HCT 41.7 02/19/2025     02/19/2025     (L) 02/19/2025    POTASSIUM 4.1 02/19/2025    CHLORIDE 97  (L) 02/19/2025    CO2 24 02/19/2025    BUN 15.1 02/19/2025    CR 0.53 (L) 02/19/2025     (H) 02/19/2025    SED 17 07/01/2024    DD 1.97 (H) 02/09/2025    TROPI <0.015 01/29/2018    AST 21 02/19/2025    ALT 24 02/19/2025    ALKPHOS 292 (H) 02/19/2025    BILITOTAL 0.5 02/19/2025    INR 1.63 (H) 02/17/2025      25-Jan-2021 20:37

## 2025-02-26 NOTE — ED PROVIDER NOTE - SKIN, MLM
Triston is entering 25 units for most meals without entering carbs.  PLEASE ENTER ALL CARB COUNTS FOR EVERYTHING  Family should watch carb entries for now  No changes to pump settings yet, but after I see data with carb entries I may need to adjust settings, so please call in about two weeks so I can review pump downloads remotely and suggest changes  A1c today is 7.4%  Yearly screening labs were just done  Follow up in three months   Skin normal color for race, warm, dry and intact. No evidence of rash.

## 2025-03-12 NOTE — PROGRESS NOTE ADULT - PROBLEM SELECTOR PLAN 2
Incoming call from patient who states he completed ECHO about a month ago and was wondering what is next.      Message routed to Dr. Long Gomez to place order for ASV titrations study as previously planned.        -Resolved. S/p EGD - showed non bleeding esophageal ulcers, esophagitis, non bleeding duodenal ulcers, no varices   -Pain  2/2 PUD /gastritis pt states he takes 800mg ibuprofen every other night for neuropathic pain.  - CT Abd/Pelvis showing diffuse bowel wall thickening involving the post bulbar duodenum, descending portion of the duodenum, with danilo duodenal stranding. Findings may be associated with duodenitis or peptic ulcer disease. No bowel obstruction is noted.  - GI (Dr. Morales/[facility]): ppi bid/carafate q6, f/u bxs,   - DASH/TLC  diet. Protonix 40mg BID . Carafate 4x day  - ECG showing mild Qtc prolongation on admission. Hold Zofran.  - Avoid QT prolonging meds.  -H/H stable

## 2025-03-31 NOTE — DISCHARGE NOTE NURSING/CASE MANAGEMENT/SOCIAL WORK - HAVE YOU HAD A FIRST COVID-19 BOOSTER?
For Your Information     If your provider ordered any imaging for you today. Our pre-scheduling services will be reaching out to you within 2 business days to schedule this. Prescheduling Services can be reached by calling [phone].    If you are in need of a medication refill please use one of the following options. You can expect your medication to be filled within 2-3 business days.   1. Call your pharmacy for all medication refills and renewals.   2. Door 6 colin and website at [email or web address]   3. Call your providers office    If your provider ordered testing today, you will be notified of your lab results within 3-5 business days and imaging results within 7-14 business days, unless specified otherwise. If you have not received your results within the allotted business days please call your provider's office. Have you signed up for the Door [employer] Colin? If not please consider doing so to receive results on the colin as soon as they have been resulted by the system. [email or web address]      Medication Prior Authorizations may take up to 30 days for approval/denial.     You may be receiving a survey.  Please take the time to complete this, as your feedback is very important to us!  We strive to make your experience exceptional, and your comments help us with that goal.  We look forward to hearing from you!    For all future appointments please arrive 15 minutes prior to your scheduled visit.     Patient Contact Center Business Office: assistance with medical billing & financial inquires [phone]            Want to Say “Thank You” to a Nurse?  The LUANN Award® was created in memory of MONTEZ Connell by his family to say thank you to nurses who provide an outstanding level of care.    Submit a nomination using any method below.     OR    [email or web address]  Or visit the Resource section   on your Door [employer] colin     
No

## 2025-05-14 NOTE — PROGRESS NOTE ADULT - SUBJECTIVE AND OBJECTIVE BOX
Please follow-up with your psychiatrist and with your outpatient providers to continue care.  Please return ED for worsening suicidal thoughts, homicidal thoughts hallucinations or any other concerning symptoms.   Patient is a 63y Male whom presented to the hospital with ckd and dena   pateint seen and examined nad , no fever , no chills      PAST MEDICAL & SURGICAL HISTORY:  GIB (gastrointestinal bleeding)  GERD with esophagitis: Gastritis &amp; Non Bleeding Ulcers  Hepatic encephalopathy  Obesity  Fatty liver disease, nonalcoholic  Renal stones: 25 years ago  Hypertension  Neuropathy  Hypercholesteremia  Diabetes  S/P cholecystectomy      MEDICATIONS  (STANDING):  dextrose 5%. 1000 milliLiter(s) (50 mL/Hr) IV Continuous <Continuous>  dextrose 50% Injectable 12.5 Gram(s) IV Push once  dextrose 50% Injectable 25 Gram(s) IV Push once  dextrose 50% Injectable 25 Gram(s) IV Push once  insulin lispro (HumaLOG) corrective regimen sliding scale   SubCutaneous three times a day before meals  insulin lispro (HumaLOG) corrective regimen sliding scale   SubCutaneous at bedtime      Allergies    codeine (Anaphylaxis)    Intolerances    NO  RED MEAT (Unknown)                                     SOCIAL HISTORY:  Denies ETOh,Smoking,     FAMILY HISTORY:  Family history of type 2 diabetes mellitus  Family history of hypertension  Family history of stomach cancer      REVIEW OF SYSTEMS:    CONSTITUTIONAL: No weakness, fevers or chills  RESPIRATORY: No cough, wheezing, hemoptysis; No shortness of breath  CARDIOVASCULAR: No chest pain or palpitations  GASTROINTESTINAL: No abdominal or epigastric pain. No nausea, vomiting,     No diarrhea or constipation. No melena   GENITOURINARY: No dysuria, frequency or hematuria  NEUROLOGICAL: No numbness or weakness  SKIN: dry                                                   8.2    3.99  )-----------( 39       ( 12 Dec 2019 08:16 )             24.0       CBC Full  -  ( 12 Dec 2019 08:16 )  WBC Count : 3.99 K/uL  RBC Count : 2.50 M/uL  Hemoglobin : 8.2 g/dL  Hematocrit : 24.0 %  Platelet Count - Automated : 39 K/uL  Mean Cell Volume : 96.0 fl  Mean Cell Hemoglobin : 32.8 pg  Mean Cell Hemoglobin Concentration : 34.2 gm/dL  Auto Neutrophil # : 2.05 K/uL  Auto Lymphocyte # : 1.08 K/uL  Auto Monocyte # : 0.62 K/uL  Auto Eosinophil # : 0.19 K/uL  Auto Basophil # : 0.04 K/uL  Auto Neutrophil % : 51.3 %  Auto Lymphocyte % : 27.1 %  Auto Monocyte % : 15.5 %  Auto Eosinophil % : 4.8 %  Auto Basophil % : 1.0 %          133<L>  |  94<L>  |  34<H>  ----------------------------<  321<H>  3.8   |  25  |  1.42<H>    Ca    11.3<H>      12 Dec 2019 07:08  Phos  2.3         TPro  6.4  /  Alb  3.8  /  TBili  8.9<H>  /  DBili  x   /  AST  27  /  ALT  19  /  AlkPhos  121<H>        CAPILLARY BLOOD GLUCOSE      POCT Blood Glucose.: 239 mg/dL (12 Dec 2019 16:13)  POCT Blood Glucose.: 246 mg/dL (12 Dec 2019 13:26)  POCT Blood Glucose.: 278 mg/dL (12 Dec 2019 12:16)  POCT Blood Glucose.: 296 mg/dL (12 Dec 2019 09:38)  POCT Blood Glucose.: 297 mg/dL (12 Dec 2019 08:33)  POCT Blood Glucose.: 292 mg/dL (11 Dec 2019 21:54)      Vital Signs Last 24 Hrs  T(C): 36.6 (12 Dec 2019 14:09), Max: 36.9 (11 Dec 2019 20:57)  T(F): 97.9 (12 Dec 2019 14:09), Max: 98.5 (11 Dec 2019 20:57)  HR: 86 (12 Dec 2019 11:25) (77 - 86)  BP: 99/62 (12 Dec 2019 11:25) (99/62 - 124/67)  BP(mean): --  RR: 18 (12 Dec 2019 04:03) (17 - 18)  SpO2: 97% (12 Dec 2019 11:25) (95% - 99%)    Urinalysis Basic - ( 10 Dec 2019 22:55 )    Color: Dark Red / Appearance: Turbid / S.025 / pH: x  Gluc: x / Ketone: Trace  / Bili: Small / Urobili: <2 mg/dL   Blood: x / Protein: 100 mg/dL / Nitrite: Negative   Leuk Esterase: Moderate / RBC: >720 /HPF / WBC >720 /HPF   Sq Epi: x / Non Sq Epi: 0 /HPF / Bacteria: Few        PT/INR - ( 11 Dec 2019 08:57 )   PT: 26.1 sec;   INR: 2.21 ratio                  PHYSICAL EXAM:    Constitutional: NAD  HEENT: conjunctive   clear   Neck:  No JVD  Respiratory: CTAB  Cardiovascular: S1 and S2  Gastrointestinal: BS+, soft, NT/ND  Extremities: No peripheral edema  Neurological:  no focal deficits  Psychiatric: Normal mood, normal affect  Skin: dry   Access: Not applicable

## 2025-06-09 NOTE — PROGRESS NOTE ADULT - SUBJECTIVE AND OBJECTIVE BOX
HPI:  Patient seen and examined on 4 DSU.  No events overnight.  Patient remains encephalopathic this AM.    Review Of Systems:  (unreliable due to encephalopathy)         Respiratory: No shortness of breath, cough, or wheezing  Cardiovascular: No chest pain or palpitations  10 point review of systems is otherwise negative except as mentioned above        Medications:  albumin human 25% IVPB 100 milliLiter(s) IV Intermittent every 8 hours  atorvastatin 10 milliGRAM(s) Oral at bedtime  cefTRIAXone   IVPB 1000 milliGRAM(s) IV Intermittent every 24 hours  dextrose 40% Gel 15 Gram(s) Oral once PRN  dextrose 5%. 1000 milliLiter(s) IV Continuous <Continuous>  dextrose 50% Injectable 12.5 Gram(s) IV Push once  dextrose 50% Injectable 25 Gram(s) IV Push once  dextrose 50% Injectable 25 Gram(s) IV Push once  glucagon  Injectable 1 milliGRAM(s) IntraMuscular once PRN  insulin glargine Injectable (LANTUS) 20 Unit(s) SubCutaneous at bedtime  insulin lispro (HumaLOG) corrective regimen sliding scale   SubCutaneous three times a day before meals  insulin lispro (HumaLOG) corrective regimen sliding scale   SubCutaneous at bedtime  lactulose Syrup 30 Gram(s) Oral every 4 hours  midodrine. 20 milliGRAM(s) Oral three times a day  pantoprazole    Tablet 40 milliGRAM(s) Oral before breakfast  rifAXIMin 550 milliGRAM(s) Oral two times a day  sucralfate 1 Gram(s) Oral two times a day  tamsulosin 0.4 milliGRAM(s) Oral at bedtime    PAST MEDICAL & SURGICAL HISTORY:  GIB (gastrointestinal bleeding)  GERD with esophagitis: Gastritis &amp; Non Bleeding Ulcers  Hepatic encephalopathy  Obesity  Fatty liver disease, nonalcoholic  Renal stones: 25 years ago  Hypertension  Neuropathy  Hypercholesteremia  Diabetes  S/P cholecystectomy    Vitals:  T(C): 37 (12-05-19 @ 13:54), Max: 37 (12-05-19 @ 13:54)  HR: 83 (12-05-19 @ 13:54) (81 - 83)  BP: 115/61 (12-05-19 @ 13:54) (115/61 - 115/61)  BP(mean): --  RR: 18 (12-05-19 @ 13:54) (17 - 20)  SpO2: 97% (12-05-19 @ 13:54) (95% - 98%)  Wt(kg): --  Daily     Daily   I&O's Summary    04 Dec 2019 07:01  -  05 Dec 2019 07:00  --------------------------------------------------------  IN: 710 mL / OUT: 601 mL / NET: 109 mL    05 Dec 2019 07:01  -  05 Dec 2019 18:16  --------------------------------------------------------  IN: 0 mL / OUT: 380 mL / NET: -380 mL        Physical Exam:  Appearance: Obese, NAD  Eyes: PERRLA, EOMI, pink conjunctiva, no scleral icterus   HENT: Normal oral mucosa  Cardiovascular: RRR, S1, S2, no murmur, rub, or gallop; no edema; no JVD  Respiratory: Clear to auscultation bilaterally  Gastrointestinal: Soft, non-tender, BS+  Musculoskeletal: No clubbing or joint deformity   Neurologic: No focal weakness  Lymphatic: No lymphadenopathy  Psychiatry: AAOx1-2  Skin: No rashes, ecchymoses, or cyanosis                        9.6    7.21  )-----------( 47       ( 05 Dec 2019 06:59 )             27.2     12-05    134<L>  |  94<L>  |  39<H>  ----------------------------<  131<H>  4.0   |  24  |  1.70<H>    Ca    10.7<H>      05 Dec 2019 06:58  Phos  2.2     12-04  Mg     2.0     12-04    TPro  6.8  /  Alb  4.0  /  TBili  9.0<H>  /  DBili  x   /  AST  28  /  ALT  18  /  AlkPhos  107  12-05    PT/INR - ( 05 Dec 2019 07:03 )   PT: 24.7 sec;   INR: 2.10 ratio         PTT - ( 04 Dec 2019 08:45 )  PTT:37.7 sec      Cardiovascular Diagnostic Testing:  ECG: NSR with long QT    Echo: < from: TTE Echo Complete w/Doppler (10.25.19 @ 09:11) >  Summary:   1. Technically difficult and limited study due to body habitus.   2. Endocardial visualization was enhanced with intravenous echo contrast.   3. Normal global left ventricular systolic function.   4. Left ventricular ejection fraction, by visual estimation, is 65 to   70%.   5. The left atrium is normal in size.   6. Sclerotic aortic valve with decreased opening.   7. Mild thickening and calcification of the anterior and posterior   mitral valve leaflets.   8. Mild mitral annular calcification.   9. Mild to moderate ascites.  10. Estimated pulmonary artery systolic pressure is 43.0 mmHg assuming a   right atrial pressure of 8 mmHg, which is consistent with mild pulmonary   hypertension.    MD Anitra Electronically signed on 10/25/2019 at 12:55:12 PM    < end of copied text >    Interpretation of Telemetry: Patient is not on tele    Imaging: < from: CT Abdomen and Pelvis No Cont (11.26.19 @ 12:55) >  FINDINGS:    CHEST:     LUNGS AND LARGE AIRWAYS: Patent central airways. No pulmonary nodules.  PLEURA: No pleural effusion.  VESSELS: Atherosclerotic changes of the aorta and coronary arteries.  HEART: Heart size is normal. No pericardial effusion.  MEDIASTINUM AND RONNIE: No lymphadenopathy.  CHEST WALL AND LOWER NECK: A new 1.5 cm rounded high density focus within   the left chest wall (3:22) from prior imaging 10/26/2019 may represent a   subcutaneous hematoma.    ABDOMEN AND PELVIS:    LIVER: Cirrhosis. Limited evaluation for focal lesion given lack of   intravenous contrast.  BILE DUCTS: Normal caliber.  GALLBLADDER: Within normal limits.  SPLEEN: Within normal limits.  PANCREAS: Within normal limits.  ADRENALS: Within normal limits.  KIDNEYS/URETERS: A 4 mm nonobstructing left renal calculus.    BLADDER: Within normal limits.  REPRODUCTIVE ORGANS: Prostate within normal limits.    BOWEL: No bowel obstruction. Appendix is normal.  PERITONEUM: No ascites.  VESSELS: Atherosclerotic changes.Upper abdominal varices.  RETROPERITONEUM/LYMPH NODES: No lymphadenopathy.    ABDOMINAL WALL: Within normal limits.  BONES: Degenerative changes.    IMPRESSION:     A new 1.5 cm rounded high density focus within the left chest wall from   prior imaging 10/26/2019 may represent a subcutaneous hematoma.   Otherwise, no evidence of acute traumatic injury.    Cirrhosis. No ascites.    RICHARD PUCKETT M.D., ATTENDING RADIOLOGIST  This document has been electronically signed. Nov26 2019  1:19PM    < end of copied text >    Cath: 12/5/2019 with normal LVEDP, low/normal PCWP, mild-moderate CAD with no interventions 78 y/o male with PMH HLD, HTN, BPH presented to cardiology with c/o reduced exercise tolerance. Patients calcium score 3236 ( LAD 1104, RCA 1823)  and an FFR consistent with LAD ischemia. Pt is referred to interventional cardiology for further evaluation,.       ASA class:  Creatinine:  GFR:  Bleeding  Risk score:  Josué Score:  76 y/o male with PMH HLD, HTN, BPH presented to cardiology with c/o reduced exercise tolerance. Patients calcium score 3236 ( LAD 1104, RCA 1823)  and an FFR consistent with LAD ischemia. Pt is referred to interventional cardiology for further evaluation,.       ASA class: II  Creatinine: 1.05  GFR: 73  Bleeding  Risk score: 1.0%  Josué Score:  1pt

## 2025-06-19 NOTE — DIETITIAN INITIAL EVALUATION ADULT. - PROBLEM/PLAN-8
Addended by: CRISTINA MO on: 6/19/2025 09:28 AM     Modules accepted: Orders    
DISPLAY PLAN FREE TEXT

## 2025-07-14 NOTE — REASON FOR VISIT
[Initial] : an initial visit  [Has anyone in your immediate family (parents, grandparents, siblings) or other more distant relatives (aunts, uncles, cousins)  of heart] : Has anyone in your immediate family (parents, grandparents, siblings) or other more distant relatives (aunts, uncles, cousins)  of heart problems or had an unexpected sudden death before age 50 (This would include unexpected drownings, unexplained car accidents in which the relative was driving or sudden infant death syndrome.)? No [Are you related to anyone with hypertrophic cardiomyopathy or hypertrophic obstructive cardiomyopathy, Marfan syndrome, arrhythmogenic] : Are you related to anyone with hypertrophic cardiomyopathy or hypertrophic obstructive cardiomyopathy, Marfan syndrome, arrhythmogenic right ventricular cardiomyopathy, long QT syndrome, short QT syndrome, Brugada syndrome or catecholaminergic polymorphic ventricular tachycardia, or anyone younger than 50 years with a pacemaker or implantable defibrillator? No [No family history of SCA predisposing conditions] : No family history of SCA predisposing conditions

## 2025-07-20 NOTE — PROGRESS NOTE ADULT - PROBLEM SELECTOR PLAN 6
-at admission had complaints of dysuria with nausea and vomiting; UA without WBCs or RBCs.  - Outpatient urine culture collected but does not appear she had UA at that time.  - Without significant fevers, further reported dysuria suspicion for significant UTI is lower. No further ciprofloxacin for time being.  - Reports abdominal symptoms persist. Lipase WNL; repeat CT Abd/Pelvis with contrast without acute intra-abdominal abnormality.  - Symptomatic management.   B12 was high in 12/19  Folate was 10.9 in 12/19  cont folate   Hemoglobin is currently stable

## (undated) DEVICE — SYR LUER LOK 10CC

## (undated) DEVICE — DRSG STERISTRIPS 0.5 X 4"

## (undated) DEVICE — DRAPE 1/2 SHEET 40X57"

## (undated) DEVICE — SUT POLYSORB 2-0 30" GS-21 UNDYED

## (undated) DEVICE — DRSG COMBINE 5X9"

## (undated) DEVICE — LAP PAD 18 X 18"

## (undated) DEVICE — POSITIONER FOAM EGG CRATE ULNAR 2PCS (PINK)

## (undated) DEVICE — DRAPE 3/4 SHEET W REINFORCEMENT 56X77"

## (undated) DEVICE — DRSG XEROFORM 1 X 8"

## (undated) DEVICE — DRSG STOCKINETTE IMPERVIOUS MED

## (undated) DEVICE — MARKING PEN W RULER

## (undated) DEVICE — MEDICATION LABELS W MARKER

## (undated) DEVICE — BLADE SCALPEL SAFETYLOCK #15

## (undated) DEVICE — SOL IRR POUR NS 0.9% 500ML

## (undated) DEVICE — DRSG WEBRIL 4"

## (undated) DEVICE — SUT PLAIN GUT 4-0 18" P-12

## (undated) DEVICE — PACK EXTREMITY

## (undated) DEVICE — GLV 7.5 PROTEXIS (WHITE)

## (undated) DEVICE — DRAPE MAGNETIC INSTRUMENT MEDIUM

## (undated) DEVICE — BUR STRYKER OVAL SOLID CARBIDE MED 4MM

## (undated) DEVICE — PACKING GAUZE PLAIN 0.5"

## (undated) DEVICE — SAW BLADE MICROAIRE SAGITTAL 9.4MMX25.4MMX0.6MM

## (undated) DEVICE — WARMING BLANKET UPPER ADULT

## (undated) DEVICE — PACKING GAUZE IODOFORM 2"

## (undated) DEVICE — DRAPE TOWEL BLUE 17" X 24"

## (undated) DEVICE — STAPLER SKIN VISI-STAT 35 WIDE

## (undated) DEVICE — TOURNIQUET ESMARK 6"

## (undated) DEVICE — DRSG TEGADERM 6"X8"

## (undated) DEVICE — SAW BLADE MICROAIRE SAGITTAL 5.8X25.4X0.6 MM

## (undated) DEVICE — DRAPE ISOLATION BAG 20X20"

## (undated) DEVICE — PACKING GAUZE PLAIN 2"

## (undated) DEVICE — NDL HYPO REGULAR BEVEL 25G X 1.5" (BLUE)

## (undated) DEVICE — VENODYNE/SCD SLEEVE CALF LARGE

## (undated) DEVICE — DRSG STOCKINETTE IMPERVIOUS XL

## (undated) DEVICE — FOLEY TRAY 16FR 5CC LTX UMETER CLOSED

## (undated) DEVICE — STRYKER INTERPULSE HANDPIECE W IRR SUCTION TUBE

## (undated) DEVICE — DRILL BIT SYNTHES ORTHO QC 2.5X110MM

## (undated) DEVICE — VISITEC 4X4

## (undated) DEVICE — DRAPE C ARM UNIVERSAL

## (undated) DEVICE — DRSG OPSITE 13.75 X 4"

## (undated) DEVICE — SAW BLADE MICROAIRE OSCILATING 25.4MM X 90MM X 1.27MM

## (undated) DEVICE — SOL IRR BAG NS 0.9% 3000ML

## (undated) DEVICE — GLV 9 DURAPRENE

## (undated) DEVICE — SUT RETRIEVER S&N LAVENDER

## (undated) DEVICE — SOL IRR POUR H2O 250ML

## (undated) DEVICE — DRAPE INSTRUMENT POUCH 6.75" X 11"

## (undated) DEVICE — GOWN TRIMAX LG

## (undated) DEVICE — PREP BETADINE KIT

## (undated) DEVICE — DRSG ACE BANDAGE 6"

## (undated) DEVICE — DRILL BIT SYNTHES ORTHO QC 3.5X110MM

## (undated) DEVICE — DRAPE XRAY CASSETTE COVER DOUBLE 20X40"

## (undated) DEVICE — BLADE SCALPEL SAFETYLOCK #11

## (undated) DEVICE — SPECIMEN CONTAINER 100ML

## (undated) DEVICE — DRSG KLING 4"

## (undated) DEVICE — DRSG STOCKINETTE TUBULAR COTTON 1PLY 6X72"

## (undated) DEVICE — GLV 8 PROTEXIS (WHITE)

## (undated) DEVICE — Device

## (undated) DEVICE — BLADE SCALPEL SAFETYLOCK #10

## (undated) DEVICE — PREP CHLORAPREP HI-LITE ORANGE 26ML

## (undated) DEVICE — DRAPE LIGHT HANDLE COVER (BLUE)

## (undated) DEVICE — DRSG CAST FIBERGLASS 4"

## (undated) DEVICE — GLV 7 PROTEXIS (WHITE)

## (undated) DEVICE — DISSECTOR ENDO PEANUT 5MM

## (undated) DEVICE — GOWN TRIMAX XXL

## (undated) DEVICE — DRSG ADAPTIC CURITY OIL EMULSION 3 X 8"

## (undated) DEVICE — SUCTION YANKAUER NO CONTROL VENT

## (undated) DEVICE — GLV 6.5 PROTEXIS (WHITE)

## (undated) DEVICE — SUT ETHIBOND 5 30" LR

## (undated) DEVICE — DRSG CAST PLASTER 4" (BLUE)

## (undated) DEVICE — GLV 8.5 PROTEXIS (WHITE)

## (undated) DEVICE — DRAPE MAYO STAND 30"

## (undated) DEVICE — SUT NDL MARTIN UTERINE 1/2 CIRCLE REVERSE CUTTING SIZE 4

## (undated) DEVICE — VESSEL LOOP MAXI-RED  0.120" X 16"

## (undated) DEVICE — DRAIN JACKSON PRATT 3 SPRING RESERVOIR W 10FR PVC DRAIN